# Patient Record
Sex: FEMALE | Race: WHITE | NOT HISPANIC OR LATINO | Employment: OTHER | URBAN - METROPOLITAN AREA
[De-identification: names, ages, dates, MRNs, and addresses within clinical notes are randomized per-mention and may not be internally consistent; named-entity substitution may affect disease eponyms.]

---

## 2017-01-25 ENCOUNTER — TELEPHONE (OUTPATIENT)
Dept: FAMILY MEDICINE | Facility: CLINIC | Age: 47
End: 2017-01-25

## 2017-01-25 NOTE — TELEPHONE ENCOUNTER
----- Message from Kate Bourgeois sent at 1/24/2017  7:25 AM CST -----  Contact: Pt   Pt would like the nurse to give her a call back in regards to going to the ER .. Contact number [phone]..

## 2017-01-28 ENCOUNTER — HOSPITAL ENCOUNTER (EMERGENCY)
Facility: HOSPITAL | Age: 47
Discharge: PSYCHIATRIC OR SUBSTANCE USE FACILITY | End: 2017-01-28
Attending: EMERGENCY MEDICINE
Payer: MEDICAID

## 2017-01-28 VITALS
TEMPERATURE: 98 F | BODY MASS INDEX: 33.75 KG/M2 | HEART RATE: 89 BPM | OXYGEN SATURATION: 96 % | SYSTOLIC BLOOD PRESSURE: 115 MMHG | RESPIRATION RATE: 17 BRPM | DIASTOLIC BLOOD PRESSURE: 78 MMHG | HEIGHT: 66 IN | WEIGHT: 210 LBS

## 2017-01-28 DIAGNOSIS — F32.A DEPRESSION, UNSPECIFIED DEPRESSION TYPE: Primary | ICD-10-CM

## 2017-01-28 DIAGNOSIS — F41.9 ANXIETY: ICD-10-CM

## 2017-01-28 LAB
ALBUMIN SERPL BCP-MCNC: 3.7 G/DL
ALP SERPL-CCNC: 64 U/L
ALT SERPL W/O P-5'-P-CCNC: 17 U/L
AMPHET+METHAMPHET UR QL: NEGATIVE
ANION GAP SERPL CALC-SCNC: 10 MMOL/L
APAP SERPL-MCNC: <3 UG/ML
AST SERPL-CCNC: 16 U/L
B-HCG UR QL: NEGATIVE
BACTERIA #/AREA URNS HPF: NORMAL /HPF
BARBITURATES UR QL SCN>200 NG/ML: NEGATIVE
BASOPHILS # BLD AUTO: 0.07 K/UL
BASOPHILS NFR BLD: 0.7 %
BENZODIAZ UR QL SCN>200 NG/ML: NEGATIVE
BILIRUB SERPL-MCNC: 0.3 MG/DL
BILIRUB UR QL STRIP: NEGATIVE
BUN SERPL-MCNC: 9 MG/DL
BZE UR QL SCN: NEGATIVE
CALCIUM SERPL-MCNC: 9.4 MG/DL
CANNABINOIDS UR QL SCN: NEGATIVE
CHLORIDE SERPL-SCNC: 103 MMOL/L
CLARITY UR: CLEAR
CO2 SERPL-SCNC: 26 MMOL/L
COLOR UR: ABNORMAL
CREAT SERPL-MCNC: 0.8 MG/DL
CREAT UR-MCNC: 40.4 MG/DL
DIFFERENTIAL METHOD: ABNORMAL
EOSINOPHIL # BLD AUTO: 0.2 K/UL
EOSINOPHIL NFR BLD: 2 %
ERYTHROCYTE [DISTWIDTH] IN BLOOD BY AUTOMATED COUNT: 14.5 %
EST. GFR  (AFRICAN AMERICAN): >60 ML/MIN/1.73 M^2
EST. GFR  (NON AFRICAN AMERICAN): >60 ML/MIN/1.73 M^2
ETHANOL SERPL-MCNC: <10 MG/DL
GLUCOSE SERPL-MCNC: 156 MG/DL
GLUCOSE UR QL STRIP: NEGATIVE
HCT VFR BLD AUTO: 39.4 %
HGB BLD-MCNC: 13.5 G/DL
HGB UR QL STRIP: NEGATIVE
KETONES UR QL STRIP: NEGATIVE
LEUKOCYTE ESTERASE UR QL STRIP: NEGATIVE
LYMPHOCYTES # BLD AUTO: 4.2 K/UL
LYMPHOCYTES NFR BLD: 40.2 %
MCH RBC QN AUTO: 29.2 PG
MCHC RBC AUTO-ENTMCNC: 34.3 %
MCV RBC AUTO: 85 FL
METHADONE UR QL SCN>300 NG/ML: NEGATIVE
MICROSCOPIC COMMENT: NORMAL
MONOCYTES # BLD AUTO: 1.1 K/UL
MONOCYTES NFR BLD: 10.6 %
NEUTROPHILS # BLD AUTO: 4.8 K/UL
NEUTROPHILS NFR BLD: 46.3 %
NITRITE UR QL STRIP: POSITIVE
OPIATES UR QL SCN: NEGATIVE
PCP UR QL SCN>25 NG/ML: NEGATIVE
PH UR STRIP: 6 [PH] (ref 5–8)
PLATELET # BLD AUTO: 466 K/UL
PMV BLD AUTO: 10.9 FL
POTASSIUM SERPL-SCNC: 4.1 MMOL/L
PROT SERPL-MCNC: 6.9 G/DL
PROT UR QL STRIP: NEGATIVE
RBC # BLD AUTO: 4.63 M/UL
SODIUM SERPL-SCNC: 139 MMOL/L
SP GR UR STRIP: 1.01 (ref 1–1.03)
SQUAMOUS #/AREA URNS HPF: 3 /HPF
TOXICOLOGY INFORMATION: NORMAL
TSH SERPL DL<=0.005 MIU/L-ACNC: 1.1 UIU/ML
URN SPEC COLLECT METH UR: ABNORMAL
UROBILINOGEN UR STRIP-ACNC: ABNORMAL EU/DL
WBC # BLD AUTO: 10.46 K/UL

## 2017-01-28 PROCEDURE — 99285 EMERGENCY DEPT VISIT HI MDM: CPT

## 2017-01-28 PROCEDURE — 87086 URINE CULTURE/COLONY COUNT: CPT

## 2017-01-28 PROCEDURE — 80053 COMPREHEN METABOLIC PANEL: CPT

## 2017-01-28 PROCEDURE — 85025 COMPLETE CBC W/AUTO DIFF WBC: CPT

## 2017-01-28 PROCEDURE — 80320 DRUG SCREEN QUANTALCOHOLS: CPT

## 2017-01-28 PROCEDURE — 81025 URINE PREGNANCY TEST: CPT

## 2017-01-28 PROCEDURE — 82570 ASSAY OF URINE CREATININE: CPT

## 2017-01-28 PROCEDURE — 84443 ASSAY THYROID STIM HORMONE: CPT

## 2017-01-28 PROCEDURE — 25000003 PHARM REV CODE 250: Performed by: EMERGENCY MEDICINE

## 2017-01-28 PROCEDURE — 80329 ANALGESICS NON-OPIOID 1 OR 2: CPT

## 2017-01-28 PROCEDURE — 81000 URINALYSIS NONAUTO W/SCOPE: CPT

## 2017-01-28 RX ORDER — CLONAZEPAM 0.5 MG/1
0.5 TABLET ORAL 2 TIMES DAILY
Status: DISCONTINUED | OUTPATIENT
Start: 2017-01-28 | End: 2017-01-28 | Stop reason: HOSPADM

## 2017-01-28 RX ORDER — CEPHALEXIN 250 MG/1
500 CAPSULE ORAL
Status: COMPLETED | OUTPATIENT
Start: 2017-01-28 | End: 2017-01-28

## 2017-01-28 RX ORDER — MULTIVIT WITH MINERALS/HERBS
1 TABLET ORAL 2 TIMES DAILY
COMMUNITY
End: 2018-08-03 | Stop reason: SDUPTHER

## 2017-01-28 RX ORDER — LANOLIN ALCOHOL/MO/W.PET/CERES
100 CREAM (GRAM) TOPICAL DAILY
COMMUNITY
End: 2018-08-03 | Stop reason: SDUPTHER

## 2017-01-28 RX ORDER — PHENAZOPYRIDINE HYDROCHLORIDE 100 MG/1
100 TABLET, FILM COATED ORAL 3 TIMES DAILY PRN
COMMUNITY
End: 2017-02-09

## 2017-01-28 RX ORDER — IBUPROFEN 200 MG
1 TABLET ORAL
Status: DISCONTINUED | OUTPATIENT
Start: 2017-01-28 | End: 2017-01-28 | Stop reason: HOSPADM

## 2017-01-28 RX ORDER — SULFAMETHOXAZOLE AND TRIMETHOPRIM 800; 160 MG/1; MG/1
1 TABLET ORAL 2 TIMES DAILY
COMMUNITY
End: 2017-02-09

## 2017-01-28 RX ORDER — IBUPROFEN 800 MG/1
800 TABLET ORAL 3 TIMES DAILY
COMMUNITY
End: 2017-04-10

## 2017-01-28 RX ORDER — CEPHALEXIN 500 MG/1
500 CAPSULE ORAL EVERY 8 HOURS
Qty: 21 CAPSULE | Refills: 0 | Status: SHIPPED | OUTPATIENT
Start: 2017-01-28 | End: 2017-02-04

## 2017-01-28 RX ADMIN — CLONAZEPAM 0.5 MG: 0.5 TABLET ORAL at 08:01

## 2017-01-28 RX ADMIN — NICOTINE 1 PATCH: 21 PATCH, EXTENDED RELEASE TRANSDERMAL at 08:01

## 2017-01-28 RX ADMIN — CEPHALEXIN 500 MG: 250 CAPSULE ORAL at 10:01
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
## 2017-01-28 NOTE — ED TRIAGE NOTES
"Pt. With PMH of bipolar disorder presents for psychiatric evaluation. Per family member, pt. Has been out of town for several days and has been "all over the board and flying off the charts", not sleeping, or eating. Pt. Reports that she has been compliant with bipolar medication. Pt. States that she received gastric sleeve in August and has difficulty keeping meds down. Pt. Reports she has allergies and has been in pain. Pt. C/o neck, lumbar and upper back problems. Pt. C/o increasing pain and no meds for relief.   "

## 2017-01-28 NOTE — ED NOTES
Patient clothes, shoes, and makeup pouch in patient belongings bag and labeled. Patient sent purse and jewelry home with Sister in law named Tabitha Man. Medications given to house supervisor (envelopes #: [phone] and [phone]).

## 2017-01-28 NOTE — ED NOTES
Pt. Clothing collected- sweatshirt, blue t-shirt, and sweat pants, shoes, makeup case with moisturizer, clear case with razor. Meds given to HS. Belongings given to security. Per patient request, purse given to Tabitha. Contact number included in chart below.

## 2017-01-30 LAB — BACTERIA UR CULT: NORMAL

## 2017-02-03 ENCOUNTER — TELEPHONE (OUTPATIENT)
Dept: FAMILY MEDICINE | Facility: CLINIC | Age: 47
End: 2017-02-03

## 2017-02-09 ENCOUNTER — LAB VISIT (OUTPATIENT)
Dept: LAB | Facility: HOSPITAL | Age: 47
End: 2017-02-09
Attending: SURGERY
Payer: MEDICAID

## 2017-02-09 DIAGNOSIS — Z98.84 STATUS POST BARIATRIC SURGERY: ICD-10-CM

## 2017-02-09 DIAGNOSIS — E61.1 IRON DEFICIENCY: ICD-10-CM

## 2017-02-09 DIAGNOSIS — E66.9 OBESITY (BMI 30.0-34.9): ICD-10-CM

## 2017-02-09 DIAGNOSIS — E56.9 UNSPECIFIED VITAMIN DEFICIENCY: ICD-10-CM

## 2017-02-09 LAB
25(OH)D3+25(OH)D2 SERPL-MCNC: 35 NG/ML
FOLATE SERPL-MCNC: 15.8 NG/ML
IRON SERPL-MCNC: 15 UG/DL
PREALB SERPL-MCNC: 17 MG/DL
SATURATED IRON: 4 %
TOTAL IRON BINDING CAPACITY: 343 UG/DL
TRANSFERRIN SERPL-MCNC: 232 MG/DL
VIT B12 SERPL-MCNC: 501 PG/ML

## 2017-02-09 PROCEDURE — 82306 VITAMIN D 25 HYDROXY: CPT

## 2017-02-09 PROCEDURE — 82607 VITAMIN B-12: CPT

## 2017-02-09 PROCEDURE — 84134 ASSAY OF PREALBUMIN: CPT

## 2017-02-09 PROCEDURE — 36415 COLL VENOUS BLD VENIPUNCTURE: CPT

## 2017-02-09 PROCEDURE — 82746 ASSAY OF FOLIC ACID SERUM: CPT

## 2017-02-09 PROCEDURE — 83540 ASSAY OF IRON: CPT

## 2017-02-09 PROCEDURE — 84466 ASSAY OF TRANSFERRIN: CPT

## 2017-02-10 ENCOUNTER — OFFICE VISIT (OUTPATIENT)
Dept: FAMILY MEDICINE | Facility: CLINIC | Age: 47
End: 2017-02-10
Payer: MEDICAID

## 2017-02-10 VITALS
OXYGEN SATURATION: 97 % | DIASTOLIC BLOOD PRESSURE: 76 MMHG | HEART RATE: 109 BPM | TEMPERATURE: 98 F | WEIGHT: 194 LBS | SYSTOLIC BLOOD PRESSURE: 120 MMHG | HEIGHT: 66 IN | BODY MASS INDEX: 31.18 KG/M2 | RESPIRATION RATE: 17 BRPM

## 2017-02-10 DIAGNOSIS — B35.4 TINEA CORPORIS: ICD-10-CM

## 2017-02-10 DIAGNOSIS — F31.9 BIPOLAR 1 DISORDER: Chronic | ICD-10-CM

## 2017-02-10 DIAGNOSIS — E11.65 UNCONTROLLED TYPE 2 DIABETES MELLITUS WITH HYPERGLYCEMIA, WITH LONG-TERM CURRENT USE OF INSULIN: Primary | Chronic | ICD-10-CM

## 2017-02-10 DIAGNOSIS — K21.9 GASTROESOPHAGEAL REFLUX DISEASE WITHOUT ESOPHAGITIS: ICD-10-CM

## 2017-02-10 DIAGNOSIS — Z79.4 UNCONTROLLED TYPE 2 DIABETES MELLITUS WITH HYPERGLYCEMIA, WITH LONG-TERM CURRENT USE OF INSULIN: Primary | Chronic | ICD-10-CM

## 2017-02-10 LAB — GLUCOSE SERPL-MCNC: 132 MG/DL (ref 70–110)

## 2017-02-10 PROCEDURE — 99999 PR PBB SHADOW E&M-EST. PATIENT-LVL III: CPT | Mod: PBBFAC,,, | Performed by: INTERNAL MEDICINE

## 2017-02-10 PROCEDURE — 99214 OFFICE O/P EST MOD 30 MIN: CPT | Mod: S$PBB,,, | Performed by: INTERNAL MEDICINE

## 2017-02-10 PROCEDURE — 82948 REAGENT STRIP/BLOOD GLUCOSE: CPT | Mod: PBBFAC,PN | Performed by: INTERNAL MEDICINE

## 2017-02-10 PROCEDURE — 99213 OFFICE O/P EST LOW 20 MIN: CPT | Mod: PBBFAC,PN | Performed by: INTERNAL MEDICINE

## 2017-02-10 RX ORDER — PANTOPRAZOLE SODIUM 40 MG/1
40 TABLET, DELAYED RELEASE ORAL DAILY
Qty: 30 TABLET | Refills: 3 | Status: SHIPPED | OUTPATIENT
Start: 2017-02-10 | End: 2017-04-13

## 2017-02-10 RX ORDER — PHENAZOPYRIDINE HYDROCHLORIDE 100 MG/1
100 TABLET, FILM COATED ORAL EVERY 8 HOURS PRN
COMMUNITY
End: 2017-04-10

## 2017-02-10 RX ORDER — NYSTATIN 100000 U/G
CREAM TOPICAL 2 TIMES DAILY
Qty: 30 G | Refills: 0 | Status: SHIPPED | OUTPATIENT
Start: 2017-02-10 | End: 2017-02-15

## 2017-02-10 RX ORDER — ACETAMINOPHEN 325 MG/1
325 TABLET ORAL EVERY 6 HOURS PRN
COMMUNITY
End: 2017-04-10

## 2017-02-10 RX ORDER — MELOXICAM 7.5 MG/1
7.5 TABLET ORAL 2 TIMES DAILY
Qty: 60 TABLET | Refills: 0 | Status: SHIPPED | OUTPATIENT
Start: 2017-02-10 | End: 2017-04-06 | Stop reason: SDUPTHER

## 2017-02-10 RX ORDER — PANTOPRAZOLE SODIUM 40 MG/1
40 TABLET, DELAYED RELEASE ORAL DAILY
Qty: 30 TABLET | Refills: 3 | Status: CANCELLED | OUTPATIENT
Start: 2017-02-10 | End: 2018-02-10

## 2017-02-10 RX ORDER — FLUCONAZOLE 150 MG/1
150 TABLET ORAL DAILY
Qty: 3 TABLET | Refills: 0 | Status: SHIPPED | OUTPATIENT
Start: 2017-02-10 | End: 2017-02-11

## 2017-02-10 RX ORDER — MELOXICAM 7.5 MG/1
7.5 TABLET ORAL 2 TIMES DAILY
Status: CANCELLED | OUTPATIENT
Start: 2017-02-10

## 2017-02-10 RX ORDER — RISPERIDONE 4 MG/1
4 TABLET ORAL 2 TIMES DAILY
COMMUNITY
End: 2017-04-10 | Stop reason: DRUGHIGH

## 2017-02-10 RX ORDER — MUPIROCIN 20 MG/G
OINTMENT TOPICAL 2 TIMES DAILY
Qty: 15 G | Refills: 1 | Status: SHIPPED | OUTPATIENT
Start: 2017-02-10 | End: 2017-02-20

## 2017-02-10 NOTE — PROGRESS NOTES
"Subjective:       Patient ID: Audrey Natarajan is a 46 y.o. female.    Chief Complaint: Medication Refill and Cough    HPI Comments: Skin problem    HPI: 45 y/o with DM, chronic lower back pain and bipolar disorder presents after recent inpatient psychiatric hospitalization for bibi. She presents today complaining of painful rahs to abominal folds and inguinal area. No fevers chills no involvement of oral mucosa or inside vaginal area she does endorse foul odor +itching no swalloing problems    Review of Systems   Constitutional: Negative for activity change, appetite change, fatigue, fever and unexpected weight change.   HENT: Negative for ear pain, rhinorrhea and sore throat.    Eyes: Negative for discharge and visual disturbance.   Respiratory: Negative for chest tightness, shortness of breath and wheezing.    Cardiovascular: Negative for chest pain, palpitations and leg swelling.   Gastrointestinal: Negative for abdominal pain, constipation and diarrhea.   Endocrine: Negative for cold intolerance and heat intolerance.   Genitourinary: Negative for dysuria and hematuria.   Musculoskeletal: Negative for joint swelling and neck stiffness.   Skin: Positive for rash.   Neurological: Negative for dizziness, syncope, weakness and headaches.   Psychiatric/Behavioral: Positive for sleep disturbance. Negative for suicidal ideas. The patient is hyperactive.        Objective:     Vitals:    02/10/17 0956   BP: 120/76   BP Location: Right arm   Patient Position: Sitting   BP Method: Manual   Pulse: 109   Resp: 17   Temp: 98.1 °F (36.7 °C)   TempSrc: Oral   SpO2: 97%   Weight: 88 kg (194 lb 0.1 oz)   Height: 5' 6" (1.676 m)          Physical Exam   Constitutional: She is oriented to person, place, and time. She appears well-developed and well-nourished.   HENT:   Head: Normocephalic and atraumatic.   Eyes: Conjunctivae are normal. Pupils are equal, round, and reactive to light.   Neck: Normal range of motion. "   Cardiovascular: Normal rate and regular rhythm.  Exam reveals no gallop and no friction rub.    No murmur heard.  Pulmonary/Chest: Effort normal and breath sounds normal. She has no wheezes. She has no rales.   Abdominal: Soft. Bowel sounds are normal. There is no tenderness. There is no rebound and no guarding.   Genitourinary:   Genitourinary Comments: External vaginal exam shows erythematous skin without fluctuance or mucosa break down no enlarged bartholin's glands   Musculoskeletal: Normal range of motion. She exhibits no edema or tenderness.   Neurological: She is alert and oriented to person, place, and time. No cranial nerve deficit.   Skin: Skin is warm and dry. Rash noted. There is erythema.   Abdominal folds and inguinal area macerated with erythematous papules without open ulceration. No discharge    Psychiatric:   Pressured tangential speech good eye contact not responding to internal stimuli       Assessment:       1. Uncontrolled type 2 diabetes mellitus with hyperglycemia, with long-term current use of insulin    2. Tinea corporis    3. Wound infection after surgery, subsequent encounter    4. Gastroesophageal reflux disease without esophagitis    5. Bipolar 1 disorder        Plan:    1. Improving glucose control s/p bariatric surgery no evidecence of dka  2. Diffuse will treat with systemic fluconazole x 3 days discusse dimportance of keeping skin dry topical antifungal cream to combat recurrance    3. Has multiple pox from scratching bactroban prn    4. Continue ppi    5. [facility]
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
## 2017-02-15 ENCOUNTER — HOSPITAL ENCOUNTER (EMERGENCY)
Facility: HOSPITAL | Age: 47
Discharge: HOME | End: 2017-02-15
Attending: EMERGENCY MEDICINE
Payer: MEDICAID

## 2017-02-15 VITALS
DIASTOLIC BLOOD PRESSURE: 59 MMHG | SYSTOLIC BLOOD PRESSURE: 111 MMHG | OXYGEN SATURATION: 99 % | HEIGHT: 66 IN | HEART RATE: 106 BPM | WEIGHT: 190 LBS | RESPIRATION RATE: 18 BRPM | TEMPERATURE: 98 F | BODY MASS INDEX: 30.53 KG/M2

## 2017-02-15 DIAGNOSIS — B37.2 CANDIDAL DERMATITIS: Primary | ICD-10-CM

## 2017-02-15 PROCEDURE — 99283 EMERGENCY DEPT VISIT LOW MDM: CPT

## 2017-02-15 RX ORDER — SULFAMETHOXAZOLE AND TRIMETHOPRIM 800; 160 MG/1; MG/1
1 TABLET ORAL 2 TIMES DAILY
Qty: 14 TABLET | Refills: 0 | Status: SHIPPED | OUTPATIENT
Start: 2017-02-15 | End: 2017-02-22

## 2017-02-15 RX ORDER — NYSTATIN 100000 [USP'U]/G
POWDER TOPICAL 2 TIMES DAILY
Qty: 60 G | Refills: 2 | Status: SHIPPED | OUTPATIENT
Start: 2017-02-15 | End: 2017-04-10

## 2017-02-15 NOTE — ED AVS SNAPSHOT
[facility]  [address] [place] [address]               Audrey Natarajan   2/15/2017  9:07 PM   ED    Description:  Female : 1970   Department:  [facility]           Your Care was Coordinated By:     Provider Role From To    Ike Alarcon MD Attending Provider 02/15/17 2117 --    JERSON Martines Physician Assistant 02/15/17 2117 --      Reason for Visit     Rash           Diagnoses this Visit        Comments    Candidal dermatitis    -  Primary       ED Disposition     None           To Do List           Follow-up Information     Schedule an appointment as soon as possible for a visit with Ej Kaminski MD.    Specialty:  Dermatology    Contact information:    [address] [place] [address]  [facility] [place] [address]  [phone]         These Medications        Disp Refills Start End    nystatin (MYCOSTATIN) powder 60 g 2 2/15/2017     Apply topically 2 (two) times daily. - Topical (Top)    Pharmacy: [facility] [address] [place] [address] EXPY AT [facility] Ph #: [phone]       sulfamethoxazole-trimethoprim 800-160mg (BACTRIM DS) 800-160 mg Tab 14 tablet 0 2/15/2017 2017    Take 1 tablet by mouth 2 (two) times daily. - Oral    Pharmacy: [facility] [address] [place] [address] EXPY AT [facility] Ph #: [phone]         [facility]     [facility] - 24/7 Assistance  Registered nurses in the [facility] provide clinical advisement, health education, appointment booking, and other advisory services.  Call for this free service at [phone].             Medications           Message regarding Medications     Verify the changes and/or additions to your medication regime listed below are the same as discussed with your clinician today.  If any of these changes or additions are incorrect, please  notify your healthcare provider.        START taking these NEW medications        Refills    nystatin (MYCOSTATIN) powder 2    Sig: Apply topically 2 (two) times daily.    Class: Print    Route: Topical (Top)    sulfamethoxazole-trimethoprim 800-160mg (BACTRIM DS) 800-160 mg Tab 0    Sig: Take 1 tablet by mouth 2 (two) times daily.    Class: Print    Route: Oral      STOP taking these medications     nystatin (MYCOSTATIN) cream Apply topically 2 (two) times daily.           Verify that the below list of medications is an accurate representation of the medications you are currently taking.  If none reported, the list may be blank. If incorrect, please contact your healthcare provider. Carry this list with you in case of emergency.           Current Medications     aspirin (ECOTRIN) 81 MG EC tablet Take 81 mg by mouth.    atorvastatin (LIPITOR) 40 MG tablet Take 1 tablet (40 mg total) by mouth once daily.    b complex vitamins tablet Take 1 tablet by mouth 2 (two) times daily.     clonazePAM (KLONOPIN) 0.5 MG tablet 2 (two) times daily.     clonazePAM (KLONOPIN) 1 MG tablet Take 1 tablet (1 mg total) by mouth every evening.    cyanocobalamin (VITAMIN B-12) 1000 MCG tablet Take 100 mcg by mouth once daily.    fish oil-omega-3 fatty acids 300-1,000 mg capsule Take 2 g by mouth once daily. Instructed to stop 5-7 days before surgery (8/11/16).    furosemide (LASIX) 20 MG tablet Take 1 tablet by mouth once daily.    gabapentin (NEURONTIN) 600 MG tablet Take 2 tablets by mouth 3 (three) times daily.    hydrOXYzine pamoate (VISTARIL) 25 MG Cap Take 1 capsule by mouth 2 (two) times daily.    meloxicam (MOBIC) 7.5 MG tablet Take 1 tablet (7.5 mg total) by mouth 2 (two) times daily.    metformin (GLUCOPHAGE) 1000 MG tablet TAKE 1 TABLET BY MOUTH TWICE DAILY WITH MEALS    metoprolol tartrate (LOPRESSOR) 50 MG tablet Take 50 mg by mouth.    mupirocin (BACTROBAN) 2 % ointment Apply topically 2 (two) times daily.    pantoprazole  "(PROTONIX) 40 MG tablet Take 1 tablet (40 mg total) by mouth once daily.    risperidone (RISPERDAL) 2 MG tablet Take 2 tablets by mouth every evening.     risperidone (RISPERDAL) 4 MG tablet Take 4 mg by mouth 2 (two) times daily.    acetaminophen (TYLENOL) 325 MG tablet Take 325 mg by mouth every 6 (six) hours as needed for Pain.    albuterol (PROAIR HFA) 90 mcg/actuation inhaler Inhale 1 puff into the lungs 4 (four) times daily as needed.    blood-glucose meter kit Use as instructed    fluticasone (FLONASE) 50 mcg/actuation nasal spray 1 spray by Each Nare route once daily.    ibuprofen (ADVIL,MOTRIN) 800 MG tablet Take 800 mg by mouth 3 (three) times daily.    lancets (ACCU-CHEK SOFTCLIX LANCETS) Misc 1 each by Misc.(Non-Drug; Combo Route) route once daily.    lisinopril (PRINIVIL,ZESTRIL) 5 MG tablet Take 1 tablet (5 mg total) by mouth once daily.    methocarbamol (ROBAXIN) 750 MG Tab Take 1 tablet (750 mg total) by mouth 4 (four) times daily as needed.    mometasone-formoterol (DULERA) 100-5 mcg/actuation HFAA Inhale 2 puffs into the lungs 2 (two) times daily.    multivitamin (THERAGRAN) per tablet Take 1 tablet by mouth every morning.    nystatin (MYCOSTATIN) powder Apply topically 2 (two) times daily.    OXYGEN-AIR DELIVERY SYSTEMS MISC 2 L by Misc.(Non-Drug; Combo Route) route daily as needed (and Mostly at Night.).     phenazopyridine (PYRIDIUM) 100 MG tablet Take 100 mg by mouth every 8 (eight) hours as needed for Pain.    polyethylene glycol (GLYCOLAX) 17 gram/dose powder Take 17 g by mouth once daily.    sulfamethoxazole-trimethoprim 800-160mg (BACTRIM DS) 800-160 mg Tab Take 1 tablet by mouth 2 (two) times daily.    TRUETEST TEST STRIPS Strp TEST BLOOD SUGAR FOUR TIMES DAILY           Clinical Reference Information           Your Vitals Were     BP Pulse Temp Resp Height Weight    111/59 (BP Location: Right arm, Patient Position: Sitting) 106 97.8 °F (36.6 °C) (Oral) 18 5' 6" (1.676 m) 86.2 kg (190 lb) "    Last Period SpO2 BMI          11/01/2011 (LMP Unknown) 99% 30.67 kg/m2        Allergies as of 2/15/2017        Reactions    Morphine Other (See Comments)    Patient had a psychotic episode after taking Morphine  Agitation, hallucinations    Penicillins Anaphylaxis    itching      Immunizations Administered on Date of Encounter - 2/15/2017     None      ED Micro, Lab, POCT     Start Ordered       Status Ordering Provider    02/15/17 2059 02/15/17 2058  POCT urine pregnancy  Once      Ordered     02/15/17 2059 02/15/17 2058  Urinalysis  STAT      Ordered       ED Imaging Orders     None        Discharge Instructions         Candida Skin Infection (Adult)  Candida is type of yeast. It grows naturally on the skin and in the mouth. If it grows out of control, it can cause an infection. Candida can cause infections in the genital area, skin folds, in the mouth, and under the breasts. Anyone can get this infection. It is more common in a person with a weak immune system, such as from diabetes, HIV, or cancer. Its also more common in someone who has been on antibiotic therapy. And its more common people who are overweight or who have incontinence. Wearing tight-fitting clothing and taking part in activities with lots of skin-to-skin contact can also put you at risk.  Candida causes the skin to become bright red and inflamed. The border of the infected part of the skin is often raised. The infection causes pain and itching. Sometimes the skin peels and bleeds. In the mouth, candida is called thrush, and may cause white thickened areas.  A Candida rash is most often treated with an antifungal cream or ointment. The rash will clear a few days after starting the medicine. Infections that dont go away may need a prescription medicine. In rare cases, a bacterial infection can also occur.  Home care  Your healthcare provider will recommend an antifungal cream or ointment for the rash. He or she may also prescribe a medicine  for the itch. Follow all instructions for using these medicines. Dont use cornstarch powder. Cornstarch can cause the Candida infection to get worse.  General care:  · Keep your skin clean by washing the area twice a day.  · Use the cream as directed until your rash is gone. Once the skin has healed, keep it dry to prevent another infection.   · If you are overweight, talk with your healthcare provider about a plan to lose excess weight.  · Avoid clothes that fit tightly.  Follow-up care  Follow up with your healthcare provider, or as advised. Your rash will clear in 7 to 14 days. Call your healthcare provider if the rash is not gone after 14 days.  When to seek medical advice  Call your healthcare provider right away if any of these occur:  · Pain or redness that gets worse or spreads  · Fluid coming from the skin  · Yellow crusts on the skin  · Fever of 100.4°F (38°C) or higher, or as directed by your healthcare provider  Date Last Reviewed: 9/1/2016  © 5074-7968 [employer]. [address] [place] [address]. All rights reserved. This information is not intended as a substitute for professional medical care. Always follow your healthcare professional's instructions.          Your Scheduled Appointments     Aug 10, 2017  9:00 AM CDT   Established Patient with Corey Mauro MD   [facility], [facility]    [address] [facility] [address] [place] [address]   [phone]              [facility] Sign-Up     Activating your [facility] account is as easy as 1-2-3!     1) Visit [email or web address], select Sign Up Now, enter this activation code and your date of birth, then select Next.  Activation code not generated  Current Patient Portal Status: Account disabled      2) Create a username and password to use when you visit [facility] in the future and select a security question in case you lose your password and select Next.    3) Enter your e-mail address and click Sign Up!    Additional  Information  If you have questions, please e-mail [email or web address] or call [phone] to talk to our [facility] staff. Remember, [facility] is NOT to be used for urgent needs. For medical emergencies, dial 911.         Smoking Cessation     If you would like to quit smoking:   You may be eligible for free services if you are a [place] resident and started smoking cigarettes before September 1, 1988.  Call the Smoking Cessation Trust (SCT) toll free at (059) 255-5531 or (481) 762-5906.   Call 2-933-QUIT-NOW if you do not meet the above criteria.             [facility] complies with applicable Federal civil rights laws and does not discriminate on the basis of race, color, national origin, age, disability, or sex.        Language Assistance Services     ATTENTION: Language assistance services are available, free of charge. Please call [phone].      ATENCIÓN: Si habla español, tiene a fine disposición servicios gratuitos de asistencia lingüística. Llame al [phone].     CHÚ Ý: N?u b?n nói Ti?ng Vi?t, có các d?ch v? h? tr? ngôn ng? mi?n phí dành cho b?n. G?i s? [phone].

## 2017-02-16 NOTE — DISCHARGE INSTRUCTIONS
Candida Skin Infection (Adult)  Candida is type of yeast. It grows naturally on the skin and in the mouth. If it grows out of control, it can cause an infection. Candida can cause infections in the genital area, skin folds, in the mouth, and under the breasts. Anyone can get this infection. It is more common in a person with a weak immune system, such as from diabetes, HIV, or cancer. Its also more common in someone who has been on antibiotic therapy. And its more common people who are overweight or who have incontinence. Wearing tight-fitting clothing and taking part in activities with lots of skin-to-skin contact can also put you at risk.  Candida causes the skin to become bright red and inflamed. The border of the infected part of the skin is often raised. The infection causes pain and itching. Sometimes the skin peels and bleeds. In the mouth, candida is called thrush, and may cause white thickened areas.  A Candida rash is most often treated with an antifungal cream or ointment. The rash will clear a few days after starting the medicine. Infections that dont go away may need a prescription medicine. In rare cases, a bacterial infection can also occur.  Home care  Your healthcare provider will recommend an antifungal cream or ointment for the rash. He or she may also prescribe a medicine for the itch. Follow all instructions for using these medicines. Dont use cornstarch powder. Cornstarch can cause the Candida infection to get worse.  General care:  · Keep your skin clean by washing the area twice a day.  · Use the cream as directed until your rash is gone. Once the skin has healed, keep it dry to prevent another infection.   · If you are overweight, talk with your healthcare provider about a plan to lose excess weight.  · Avoid clothes that fit tightly.  Follow-up care  Follow up with your healthcare provider, or as advised. Your rash will clear in 7 to 14 days. Call your healthcare provider if the rash  is not gone after 14 days.  When to seek medical advice  Call your healthcare provider right away if any of these occur:  · Pain or redness that gets worse or spreads  · Fluid coming from the skin  · Yellow crusts on the skin  · Fever of 100.4°F (38°C) or higher, or as directed by your healthcare provider  Date Last Reviewed: 9/1/2016  © 0747-2923 [employer]. [address] [place] [address], [place] [address]. All rights reserved. This information is not intended as a substitute for professional medical care. Always follow your healthcare professional's instructions.

## 2017-02-16 NOTE — ED PROVIDER NOTES
"Encounter Date: 2/15/2017    SCRIBE #1 NOTE: I, Prosper Rubi, am scribing for, and in the presence of,  Tennille Julio PA-C. I have scribed the following portions of the note - Other sections scribed: HPI/ROS.       History     Chief Complaint   Patient presents with    Rash     " I have yeast that is on my lower belly, and thighs that is very red. It has an odor to it. I tried rubbing cream on it w/ no relief.     Review of patient's allergies indicates:   Allergen Reactions    Morphine Other (See Comments)     Patient had a psychotic episode after taking Morphine  Agitation, hallucinations    Penicillins Anaphylaxis     itching     HPI Comments: CC: Rash    HPI: This 46 y.o. Female with COPD, DM, hypercholesteremia, DVT of lower extremities, PE, coronary artery disease, bipolar 1 disorder, neuromuscular disorder, depression, and HTN presents to the ED c/o a yeast infection on her lower abdomin and upper pubic region that began about 2 weeks ago. The pt has some possibly broken skin around her infection. Pt has been using nystatin cream on her rash for the last week without relief. The pt denies fever, nausea, or vomiting.       The history is provided by the patient. No  was used.     Past Medical History   Diagnosis Date    Arthritis     Asthma     Bipolar 1 disorder     COPD (chronic obstructive pulmonary disease)     Coronary artery disease      A fib    Depression      bipolar manic depresson    Diabetes mellitus     DVT of lower extremity, bilateral July 2013     bilateral LE DVT. [place] filter placed.     Encounter for blood transfusion     History of blood clots 1. Left Leg=2003; 2.Bilateral Groin=Blood Clots= 5 or 6/ 2013 & 7/2013; 3. LLL of Lung=7/2013;  4. Lt. Lower Leg=7/2013.      Pt. had 1st Blood Clot after Zifrxezufgei=[id number], & Last=2013. [place] Filter= Rt.Lateral Neck.    HTN (hypertension) 6/6/2013     Pt states that she does not have hypertension    " "Hypercholesteremia     Irregular heartbeat     Neuromuscular disorder      neuropathy feet    PE (pulmonary embolism) 2013      bilat LE DVT.     Restless leg syndrome      Past Medical History Pertinent Negatives   Diagnosis Date Noted    Amblyopia 2015    Cataract 2015    Diabetic retinopathy 2015    Glaucoma 2015    Macular degeneration 2015    Myocardial infarction 2014    Retinal detachment 2015    Sickle cell anemia 2015    Sickle cell trait 2015    Strabismus 2015    Uveitis 2015     Past Surgical History   Procedure Laterality Date    Splenectomy, total  2003    Vein surgery       Lt leg    Green' s filter Right 2012     Right Neck & Tunneled Down.    Tonsillectomy       as a child    Abdominal surgery      Ovarian cyst removal  3/13/2014    Hernia repair       "[employer] around th Belly Button.", pt. states    Bilateral oophorectomy Bilateral 2015    Pr removal of ovary/tube(s)      Tympanostomy tube placement  1976     Family History   Problem Relation Age of Onset    Hypertension Father     Diabetes Father     Heart disease Father     Cataracts Father     Diabetes Paternal Grandfather     Heart disease Paternal Grandfather     No Known Problems Mother     Ovarian cancer Maternal Grandmother       from this. ? age     No Known Problems Sister     No Known Problems Brother     No Known Problems Maternal Aunt     No Known Problems Maternal Uncle     No Known Problems Paternal Aunt     No Known Problems Paternal Uncle     No Known Problems Maternal Grandfather     No Known Problems Paternal Grandmother     Uterine cancer Neg Hx     Breast cancer Neg Hx     Colon cancer Neg Hx     Amblyopia Neg Hx     Blindness Neg Hx     Cancer Neg Hx     Glaucoma Neg Hx     Macular degeneration Neg Hx     Retinal detachment Neg Hx     Strabismus Neg Hx     Stroke Neg Hx     " Thyroid disease Neg Hx      Social History   Substance Use Topics    Smoking status: Current Every Day Smoker     Packs/day: 1.50     Years: 25.00     Types: Cigarettes     Last attempt to quit: 5/25/2016    Smokeless tobacco: Never Used    Alcohol use No     Review of Systems   Constitutional: Negative for fever.   HENT: Negative for sore throat.    Respiratory: Negative for shortness of breath.    Cardiovascular: Negative for chest pain.   Gastrointestinal: Negative for nausea.   Genitourinary: Negative for dysuria.   Musculoskeletal: Negative for back pain.   Skin: Positive for rash.   Neurological: Negative for weakness.   Hematological: Does not bruise/bleed easily.       Physical Exam   Initial Vitals   BP Pulse Resp Temp SpO2   02/15/17 1922 02/15/17 1922 02/15/17 1922 02/15/17 1922 02/15/17 1922   111/59 106 18 97.8 °F (36.6 °C) 99 %     Physical Exam    Constitutional: She appears well-developed and well-nourished.   Cardiovascular: Normal rate, regular rhythm, normal heart sounds and intact distal pulses.   Pulmonary/Chest: Breath sounds normal.   Neurological: She is alert and oriented to person, place, and time. She has normal strength.   Skin: Skin is warm. Rash (multiple erythematous satellite lesions to lower abdomen and groin area with some excoriations.) noted.         ED Course   Procedures  Labs Reviewed - No data to display          Medical Decision Making:   Initial Assessment:   46-year-old female with a past medical history of diabetes, hypertension and recent weight loss surgery presents complaining of a pruritic burning rash to lower abdomen and groin area.  Patient currently using nystatin cream without relief.  Patient reports area is still painful and moist.  On exam patient has multiple erythematous satellite lesions to lower abdomen and groin area.  There is some areas of skin excoriations.  I suspect patient has candidal dermatitis.  I will discharge patient home with nystatin  powder and Bactrim orally.  Patient instructed to follow up with PCP.  Patient is stable for discharge.            Scribe Attestation:   Scribe #1: I performed the above scribed service and the documentation accurately describes the services I performed. I attest to the accuracy of the note.    Attending Attestation:     Physician Attestation Statement for NP/PA:   I discussed this assessment and plan of this patient with the NP/PA, but I did not personally examine the patient. The face to face encounter was performed by the NP/PA.    Other NP/PA Attestation Additions:      Medical Decision Makin-year-old female presenting with rash.  Exam consistent with candidal infection.  I agree with plan.       Physician Attestation for Scribe:  Physician Attestation Statement for Scribe #1: I, Tennille Julio PA-C, reviewed documentation, as scribed by Prosper Rubi in my presence, and it is both accurate and complete.                 ED Course     Clinical Impression:   The encounter diagnosis was Candidal dermatitis.          JERSON Martines  17 [phone]       Ike Alarcon MD  17 0816

## 2017-02-16 NOTE — ED TRIAGE NOTES
Pt reports she had weight loss surgery, now she has a red rash under her large abdominal skin folds. She complains of burning pain to rash. She went to PCP last week and they gave her nystatin cream. She reportedly went to [facility] for anxiety and depression and got out of there last Wed.  Has been using Nystatin cream 7 days. Pt reports she was here earlier, but LWBS and now she is back.

## 2017-03-07 DIAGNOSIS — M54.32 LEFT SIDED SCIATICA: ICD-10-CM

## 2017-03-07 RX ORDER — METHOCARBAMOL 750 MG/1
750 TABLET, FILM COATED ORAL 4 TIMES DAILY PRN
Qty: 60 TABLET | Refills: 1 | Status: SHIPPED | OUTPATIENT
Start: 2017-03-07 | End: 2017-04-10 | Stop reason: SDUPTHER

## 2017-03-07 NOTE — TELEPHONE ENCOUNTER
----- Message from Gisella Plasencia sent at 3/7/2017  9:55 AM CST -----  Refill: methocarbamol (ROBAXIN) 750 MG Tab    Please send to [facility]. Please call patient at [phone]. Thank you!

## 2017-03-30 DIAGNOSIS — E11.59 HYPERTENSION ASSOCIATED WITH DIABETES: ICD-10-CM

## 2017-03-30 DIAGNOSIS — I15.2 HYPERTENSION ASSOCIATED WITH DIABETES: ICD-10-CM

## 2017-03-31 RX ORDER — LISINOPRIL 5 MG/1
TABLET ORAL
Qty: 30 TABLET | Refills: 0 | Status: SHIPPED | OUTPATIENT
Start: 2017-03-31 | End: 2017-06-19 | Stop reason: SDUPTHER

## 2017-04-06 RX ORDER — MELOXICAM 7.5 MG/1
TABLET ORAL
Qty: 60 TABLET | Refills: 0 | Status: SHIPPED | OUTPATIENT
Start: 2017-04-06 | End: 2017-05-03 | Stop reason: SDUPTHER

## 2017-04-07 DIAGNOSIS — E11.59 HYPERTENSION ASSOCIATED WITH DIABETES: ICD-10-CM

## 2017-04-07 DIAGNOSIS — I15.2 HYPERTENSION ASSOCIATED WITH DIABETES: ICD-10-CM

## 2017-04-07 RX ORDER — FUROSEMIDE 20 MG/1
TABLET ORAL
Qty: 30 TABLET | Refills: 1 | Status: SHIPPED | OUTPATIENT
Start: 2017-04-07 | End: 2017-07-11 | Stop reason: SDUPTHER

## 2017-04-10 ENCOUNTER — OFFICE VISIT (OUTPATIENT)
Dept: FAMILY MEDICINE | Facility: CLINIC | Age: 47
End: 2017-04-10
Payer: MEDICAID

## 2017-04-10 VITALS
OXYGEN SATURATION: 97 % | HEIGHT: 66 IN | HEART RATE: 82 BPM | TEMPERATURE: 98 F | RESPIRATION RATE: 17 BRPM | SYSTOLIC BLOOD PRESSURE: 138 MMHG | DIASTOLIC BLOOD PRESSURE: 78 MMHG | BODY MASS INDEX: 34.61 KG/M2 | WEIGHT: 215.38 LBS

## 2017-04-10 DIAGNOSIS — H60.502 ACUTE NONINFECTIVE OTITIS EXTERNA OF LEFT EAR, UNSPECIFIED TYPE: Primary | ICD-10-CM

## 2017-04-10 DIAGNOSIS — M54.32 LEFT SIDED SCIATICA: ICD-10-CM

## 2017-04-10 PROCEDURE — 99213 OFFICE O/P EST LOW 20 MIN: CPT | Mod: PBBFAC,PN | Performed by: INTERNAL MEDICINE

## 2017-04-10 PROCEDURE — 99999 PR PBB SHADOW E&M-EST. PATIENT-LVL III: CPT | Mod: PBBFAC,,, | Performed by: INTERNAL MEDICINE

## 2017-04-10 PROCEDURE — 99214 OFFICE O/P EST MOD 30 MIN: CPT | Mod: S$PBB,,, | Performed by: INTERNAL MEDICINE

## 2017-04-10 RX ORDER — HYDROXYZINE HYDROCHLORIDE 25 MG/1
TABLET, FILM COATED ORAL
Refills: 3 | COMMUNITY
Start: 2017-03-31 | End: 2017-08-18

## 2017-04-10 RX ORDER — METOPROLOL TARTRATE 100 MG/1
TABLET ORAL
Qty: 30 TABLET | Refills: 0 | OUTPATIENT
Start: 2017-04-10

## 2017-04-10 RX ORDER — METHOCARBAMOL 750 MG/1
750 TABLET, FILM COATED ORAL 4 TIMES DAILY PRN
Qty: 60 TABLET | Refills: 1 | Status: SHIPPED | OUTPATIENT
Start: 2017-04-10 | End: 2017-06-19 | Stop reason: SDUPTHER

## 2017-04-10 RX ORDER — CARBAMAZEPINE 200 MG/1
TABLET ORAL
Refills: 1 | Status: ON HOLD | COMMUNITY
Start: 2017-03-23 | End: 2021-02-20 | Stop reason: HOSPADM

## 2017-04-10 RX ORDER — ACETAMINOPHEN 500 MG/1
500 CAPSULE, LIQUID FILLED ORAL 3 TIMES DAILY PRN
COMMUNITY
End: 2017-04-13

## 2017-04-10 RX ORDER — RISPERIDONE 3 MG/1
TABLET ORAL
Refills: 1 | Status: ON HOLD | COMMUNITY
Start: 2017-03-23 | End: 2021-02-20 | Stop reason: HOSPADM

## 2017-04-10 RX ORDER — METOPROLOL TARTRATE 100 MG/1
TABLET ORAL
Refills: 0 | COMMUNITY
Start: 2017-03-12 | End: 2017-04-10 | Stop reason: DRUGHIGH

## 2017-04-10 RX ORDER — METOPROLOL TARTRATE 50 MG/1
50 TABLET ORAL
Status: CANCELLED | OUTPATIENT
Start: 2017-04-10

## 2017-04-10 RX ORDER — NEOMYCIN SULFATE, POLYMYXIN B SULFATE AND HYDROCORTISONE 10; 3.5; 1 MG/ML; MG/ML; [USP'U]/ML
3 SUSPENSION/ DROPS AURICULAR (OTIC) 3 TIMES DAILY
Qty: 10 ML | Refills: 0 | Status: SHIPPED | OUTPATIENT
Start: 2017-04-10 | End: 2017-05-15 | Stop reason: SDUPTHER

## 2017-04-10 NOTE — PROGRESS NOTES
"Subjective:       Patient ID: Audrey Natarajan is a 46 y.o. female.    Chief Complaint: Otalgia (left)    HPI Comments: Right ear pain x three days    HPI: 47 y/o w/ morbid obesity s/p partial gastrectomy, HTN, chronic lower back pain presents with three day history of right ear pain. Pain described as "throbbibing" no discharge pain worse with lying on right side. No tinnitus. No medicaitons taken. She does use qtips to clean ears. No fevers/chills. No vision changes no dysphagia. Minimal rhinorhea no sore throat no cought no wheezing    Review of Systems   Constitutional: Negative for activity change, appetite change, fatigue, fever and unexpected weight change.   HENT: Positive for ear pain. Negative for rhinorrhea and sore throat.    Eyes: Negative for discharge and visual disturbance.   Respiratory: Negative for chest tightness, shortness of breath and wheezing.    Cardiovascular: Negative for chest pain, palpitations and leg swelling.   Gastrointestinal: Negative for abdominal pain, constipation and diarrhea.   Endocrine: Negative for cold intolerance and heat intolerance.   Genitourinary: Negative for dysuria and hematuria.   Musculoskeletal: Positive for arthralgias and back pain. Negative for joint swelling and neck stiffness.   Skin: Negative for rash.   Neurological: Negative for dizziness, syncope, weakness and headaches.   Psychiatric/Behavioral: Negative for suicidal ideas.       Objective:     Vitals:    04/10/17 0921   BP: 138/78   BP Location: Left arm   Patient Position: Sitting   BP Method: Manual   Pulse: 82   Resp: 17   Temp: 98.3 °F (36.8 °C)   TempSrc: Oral   SpO2: 97%   Weight: 97.7 kg (215 lb 6.2 oz)   Height: 5' 6" (1.676 m)          Physical Exam   Constitutional: She is oriented to person, place, and time. She appears well-developed and well-nourished.   HENT:   Head: Normocephalic and atraumatic.   Mid portion of right external canal with erythema without crusting or ulceration. No " pain with manipulation of auricle. No drainage, TM is intact   Eyes: Conjunctivae are normal. Pupils are equal, round, and reactive to light.   Neck: Normal range of motion.   Cardiovascular: Normal rate and regular rhythm.  Exam reveals no gallop and no friction rub.    No murmur heard.  Pulmonary/Chest: Effort normal and breath sounds normal. She has no wheezes. She has no rales.   Abdominal: Soft. Bowel sounds are normal. There is no tenderness. There is no rebound and no guarding.   Musculoskeletal: Normal range of motion. She exhibits no edema or tenderness.   Neurological: She is alert and oriented to person, place, and time. No cranial nerve deficit.   Skin: Skin is warm and dry.   Psychiatric: She has a normal mood and affect.       Assessment:       1. Acute noninfective otitis externa of left ear, unspecified type    2. Left sided sciatica        Plan:    1. Dry ear precautions cortiosporin for anti inflammatory effect    2. Prn muscler relaxer and NSAID.

## 2017-04-12 ENCOUNTER — HOSPITAL ENCOUNTER (EMERGENCY)
Facility: HOSPITAL | Age: 47
Discharge: HOME | End: 2017-04-12
Attending: EMERGENCY MEDICINE
Payer: MEDICAID

## 2017-04-12 VITALS
HEART RATE: 80 BPM | BODY MASS INDEX: 34.23 KG/M2 | RESPIRATION RATE: 20 BRPM | WEIGHT: 213 LBS | TEMPERATURE: 99 F | DIASTOLIC BLOOD PRESSURE: 70 MMHG | OXYGEN SATURATION: 98 % | HEIGHT: 66 IN | SYSTOLIC BLOOD PRESSURE: 140 MMHG

## 2017-04-12 DIAGNOSIS — H60.502 ACUTE OTITIS EXTERNA OF LEFT EAR, UNSPECIFIED TYPE: ICD-10-CM

## 2017-04-12 DIAGNOSIS — H92.02 LEFT EAR PAIN: Primary | ICD-10-CM

## 2017-04-12 LAB
ALBUMIN SERPL BCP-MCNC: 3.7 G/DL
ALP SERPL-CCNC: 57 U/L
ALT SERPL W/O P-5'-P-CCNC: 15 U/L
ANION GAP SERPL CALC-SCNC: 7 MMOL/L
AST SERPL-CCNC: 12 U/L
B-HCG UR QL: NEGATIVE
BASOPHILS # BLD AUTO: 0.11 K/UL
BASOPHILS NFR BLD: 1.1 %
BILIRUB SERPL-MCNC: 0.3 MG/DL
BUN SERPL-MCNC: 15 MG/DL
CALCIUM SERPL-MCNC: 9.3 MG/DL
CHLORIDE SERPL-SCNC: 102 MMOL/L
CO2 SERPL-SCNC: 30 MMOL/L
CREAT SERPL-MCNC: 0.6 MG/DL
CTP QC/QA: YES
DIFFERENTIAL METHOD: ABNORMAL
EOSINOPHIL # BLD AUTO: 0.6 K/UL
EOSINOPHIL NFR BLD: 6.2 %
ERYTHROCYTE [DISTWIDTH] IN BLOOD BY AUTOMATED COUNT: 15.7 %
EST. GFR  (AFRICAN AMERICAN): >60 ML/MIN/1.73 M^2
EST. GFR  (NON AFRICAN AMERICAN): >60 ML/MIN/1.73 M^2
GLUCOSE SERPL-MCNC: 131 MG/DL
HCT VFR BLD AUTO: 39.1 %
HGB BLD-MCNC: 13.4 G/DL
LYMPHOCYTES # BLD AUTO: 4.9 K/UL
LYMPHOCYTES NFR BLD: 46.7 %
MCH RBC QN AUTO: 29.8 PG
MCHC RBC AUTO-ENTMCNC: 34.3 %
MCV RBC AUTO: 87 FL
MONOCYTES # BLD AUTO: 0.9 K/UL
MONOCYTES NFR BLD: 8.5 %
NEUTROPHILS # BLD AUTO: 3.9 K/UL
NEUTROPHILS NFR BLD: 37.3 %
PLATELET # BLD AUTO: 464 K/UL
PMV BLD AUTO: 9.7 FL
POTASSIUM SERPL-SCNC: 4.5 MMOL/L
PROT SERPL-MCNC: 6.8 G/DL
RBC # BLD AUTO: 4.5 M/UL
SODIUM SERPL-SCNC: 139 MMOL/L
WBC # BLD AUTO: 10.4 K/UL

## 2017-04-12 PROCEDURE — 99283 EMERGENCY DEPT VISIT LOW MDM: CPT | Mod: 25

## 2017-04-12 PROCEDURE — 96372 THER/PROPH/DIAG INJ SC/IM: CPT

## 2017-04-12 RX ORDER — IBUPROFEN 600 MG/1
600 TABLET ORAL EVERY 6 HOURS PRN
Qty: 30 TABLET | Refills: 0 | Status: SHIPPED | OUTPATIENT
Start: 2017-04-12 | End: 2017-05-03

## 2017-04-12 RX ORDER — CIPROFLOXACIN 500 MG/1
500 TABLET ORAL
Status: COMPLETED | OUTPATIENT
Start: 2017-04-12 | End: 2017-04-12

## 2017-04-12 RX ORDER — CIPROFLOXACIN 500 MG/1
500 TABLET ORAL 2 TIMES DAILY
Qty: 20 TABLET | Refills: 0 | Status: SHIPPED | OUTPATIENT
Start: 2017-04-12 | End: 2017-04-22

## 2017-04-12 RX ORDER — KETOROLAC TROMETHAMINE 30 MG/ML
10 INJECTION, SOLUTION INTRAMUSCULAR; INTRAVENOUS
Status: COMPLETED | OUTPATIENT
Start: 2017-04-12 | End: 2017-04-12

## 2017-04-12 RX ADMIN — KETOROLAC TROMETHAMINE 10 MG: 30 INJECTION, SOLUTION INTRAMUSCULAR at 09:04

## 2017-04-12 RX ADMIN — CIPROFLOXACIN HYDROCHLORIDE 500 MG: 500 TABLET, FILM COATED ORAL at 09:04

## 2017-04-12 NOTE — ED PROVIDER NOTES
Encounter Date: 4/12/2017       History     Chief Complaint   Patient presents with    Otalgia     Pt. recently seen for left sided ear pain a week ago c/o increasing pain to left side of neck, jaw, and ear.     Jaw Pain     Review of patient's allergies indicates:   Allergen Reactions    Morphine Other (See Comments)     Patient had a psychotic episode after taking Morphine  Agitation, hallucinations    Penicillins Anaphylaxis     itching     HPI Comments: 46-year-old female with numerous comorbidities, including morbid obesity, hypertension, low back pain, COPD, diabetes mellitus, DVT, PE, coronary artery disease, bipolar disorder, depression, presents the ED with chief complaint left ear pain ×5 days.  Patient was recently seen by Dr. Man Pena for similar complaint 2 days ago.  She was given Polytrim otic drops and diagnosed with otitis externa.  Today she presents with increasing pain, and complaints of facial swelling.  She admits to left face pain and left postauricular pain.  She denies fevers/chills.  She denies neck stiffness, or difficulty with range of motion.  She denies ear discharge.  She states pain is progressed from just her ear to the surrounding area of her ear and also her cheek and jaw.  No difficulty with mastication.  No alleviating or exacerbating factors.  Symptoms are constant.  She denies dental pain or jaw stiffness.    The history is provided by the patient and medical records.     Past Medical History:   Diagnosis Date    Arthritis     Asthma     Bipolar 1 disorder     COPD (chronic obstructive pulmonary disease)     Coronary artery disease     A fib    Depression     bipolar manic depresson    Diabetes mellitus     DVT of lower extremity, bilateral July 2013    bilateral LE DVT. Estelita filter placed.     Encounter for blood transfusion     History of blood clots 1. Left Leg=2003; 2.Bilateral Groin=Blood Clots= 5 or 6/ 2013 & 7/2013; 3. LLL of Lung=7/2013;  4. Lt.  "Lower Leg=2013.     Pt. had 1st Blood Clot after Jkjlbksaxbjt=9053, & Last=. [place] Filter= Rt.Lateral Neck.    HTN (hypertension) 2013    Pt states that she does not have hypertension    Hypercholesteremia     Irregular heartbeat     Neuromuscular disorder     neuropathy feet    PE (pulmonary embolism) 2013     bilat LE DVT.     Restless leg syndrome      Past Surgical History:   Procedure Laterality Date    ABDOMINAL SURGERY      BILATERAL OOPHORECTOMY Bilateral 2015    Green' s filter Right 2012    Right Neck & Tunneled Down.    HERNIA REPAIR      "[place] of Hernias Repaires around th [place]ly Button.", pt. states    OVARIAN CYST REMOVAL  3/13/2014    AL REMOVAL OF OVARY/TUBE(S)      SPLENECTOMY, TOTAL  2003    TONSILLECTOMY      as a child    TYMPANOSTOMY TUBE PLACEMENT      VEIN SURGERY      Lt leg     Family History   Problem Relation Age of Onset    Hypertension Father     Diabetes Father     Heart disease Father     Cataracts Father     Diabetes Paternal Grandfather     Heart disease Paternal Grandfather     No Known Problems Mother     Ovarian cancer Maternal Grandmother       from this. ? age     No Known Problems Sister     No Known Problems Brother     No Known Problems Maternal Aunt     No Known Problems Maternal Uncle     No Known Problems Paternal Aunt     No Known Problems Paternal Uncle     No Known Problems Maternal Grandfather     No Known Problems Paternal Grandmother     Uterine cancer Neg Hx     Breast cancer Neg Hx     Colon cancer Neg Hx     Amblyopia Neg Hx     Blindness Neg Hx     Cancer Neg Hx     Glaucoma Neg Hx     Macular degeneration Neg Hx     Retinal detachment Neg Hx     Strabismus Neg Hx     Stroke Neg Hx     Thyroid disease Neg Hx      Social History   Substance Use Topics    Smoking status: Current Every Day Smoker     Packs/day: 1.50     Years: 25.00     Types: Cigarettes     Last attempt to " quit: 5/25/2016    Smokeless tobacco: Never Used    Alcohol use No     Review of Systems   Constitutional: Negative for activity change, appetite change, chills and fever.   HENT: Positive for ear pain (Left) and facial swelling. Negative for drooling, ear discharge, sore throat and trouble swallowing.    Eyes: Negative for photophobia, pain and discharge.   Respiratory: Negative for apnea, cough, chest tightness, shortness of breath and wheezing.    Cardiovascular: Negative for chest pain, palpitations and leg swelling.   Gastrointestinal: Negative for abdominal pain, nausea and vomiting.   Endocrine: Negative.    Genitourinary: Negative.    Musculoskeletal: Negative for neck pain and neck stiffness.   Skin: Negative for color change, rash and wound.   Allergic/Immunologic: Negative.    Neurological: Negative for dizziness, syncope, weakness, light-headedness and headaches.   Hematological: Negative.    Psychiatric/Behavioral: Negative.    All other systems reviewed and are negative.      Physical Exam   Initial Vitals   BP Pulse Resp Temp SpO2   04/12/17 0829 04/12/17 0829 04/12/17 0829 04/12/17 0829 04/12/17 0829   144/67 78 17 97.8 °F (36.6 °C) 99 %     Physical Exam    Constitutional: Vital signs are normal. She appears well-developed and well-nourished. She is not diaphoretic. She is cooperative.  Non-toxic appearance. She does not have a sickly appearance. She does not appear ill. No distress.   HENT:   Head: Normocephalic and atraumatic.   Right Ear: Tympanic membrane, external ear and ear canal normal.   Left Ear: External ear normal.   Mouth/Throat: Oropharynx is clear and moist. No oropharyngeal exudate.   Left TM dull, with some ear canal exudate.  No erythema.  No perforation.  She is tender to palpation postauricular area, also tender just anterior to the left ear.  No significant preauricular or postauricular swelling.  Full range of motion of jaw.  No trismus.  No oropharyngeal exudate or swelling.    Eyes: Conjunctivae and EOM are normal.   Neck: Normal range of motion. Neck supple. No tracheal deviation present.   Cardiovascular: Normal heart sounds.   Pulmonary/Chest: Breath sounds normal. No respiratory distress. She has no wheezes.   Abdominal: Soft. Bowel sounds are normal. She exhibits no distension. There is no tenderness.   Musculoskeletal: Normal range of motion. She exhibits no tenderness.   Lymphadenopathy:     She has no cervical adenopathy.   Neurological: She is alert and oriented to person, place, and time. She has normal strength.   Skin: Skin is warm and dry. No rash and no abscess noted. No erythema.   Psychiatric: She has a normal mood and affect. Her behavior is normal. Judgment and thought content normal.         ED Course   Procedures  Labs Reviewed   CBC W/ AUTO DIFFERENTIAL - Abnormal; Notable for the following:        Result Value    RDW 15.7 (*)     Platelets 464 (*)     Lymph # 4.9 (*)     Eos # 0.6 (*)     Gran% 37.3 (*)     All other components within normal limits   COMPREHENSIVE METABOLIC PANEL - Abnormal; Notable for the following:     CO2 30 (*)     Glucose 131 (*)     Anion Gap 7 (*)     All other components within normal limits   POCT URINE PREGNANCY             Medical Decision Making:   Initial Assessment:   46-year-old female chief complaint left ear pain, recently seen by her PCP, but now has increasing pain over the past few days.  Differential Diagnosis:   Otitis externa, otitis media, malignant otitis externa  Clinical Tests:   Lab Tests: Ordered and Reviewed  The following lab test(s) were unremarkable: CBC, CMP and UPT  ED Management:  Patient overall well-appearing, in no acute distress, afebrile, vitals within normal limits.  Patient's chief complaint is increasing pain to her left ear.  She is currently being treated for otitis externa by her primary care physician.  She saw Dr. Pena 2 days ago, and was discharged with Cortisporin otic drops for otitis externa.   She has been using drops as prescribed.    On physical exam she continues to have full range of motion of her jaw, no trouble with mastication, no headache, no dental pain.  There is no significant preauricular or postauricular swelling.  There is no erythema or warmth. There is no evidence of cellulitis.  At this time, I do not suspect mastoiditis or malignant otitis externa.  However, she has begun to fail topical therapy, so I do think we need to give her some by mouth antibiotics to assist in the treatment of this infection.  Patient understands and agrees this plan.  I've asked her to follow with her primary care physician in 2 days for reevaluation of symptoms.  I've also given her ENT information to be able to follow-up as well.  I do think she would benefit from going to see ENT if the pain persists.  I've asked her to go ahead and call Dr. Bains, so that he can see her and evaluate her ear pain.  I've asked her to return to the emergency department if problems occur.  Other:   I have discussed this case with another health care provider.       <> Summary of the Discussion: I have discussed this case with .                   ED Course     Clinical Impression:   The primary encounter diagnosis was Left ear pain. A diagnosis of Acute otitis externa of left ear, unspecified type was also pertinent to this visit.    Disposition:   Disposition: Discharged  Condition: Stable       Issac Rosas PA-C  04/12/17 1908

## 2017-04-12 NOTE — ED AVS SNAPSHOT
[facility]  [address] [place] [address]               Audrey Natarajan   2017  8:31 AM   ED    Description:  Female : 1970   Department:  [facility]           Your Care was Coordinated By:     Provider Role From To    Shaggy Sanchez MD Attending Provider 1740 17    Karlos Garrett MD Attending Provider 17 --    MARILY AguilarC Physician Assistant 17 --      Reason for Visit     Otalgia     Jaw Pain           Diagnoses this Visit        Comments    Left ear pain    -  Primary     Acute otitis externa of left ear, unspecified type           ED Disposition     ED Disposition Condition Comment    Discharge             To Do List           Follow-up Information     Follow up with Marissa Rivera MD In 2 days.    Specialty:  Internal Medicine    Why:  For reevaluation of symptoms.    Contact information:    [address]  [place] [address]  [phone]          Follow up with Raghu Bains MD. Schedule an appointment as soon as possible for a visit in 2 days.    Specialty:  Otolaryngology    Why:  Make appointment with Dr. Bains for reevaluation of symptoms.    Contact information:    [address]  [facility] [address] [phone]          Follow up with [facility].    Specialty:  Emergency Medicine    Why:  As needed, If symptoms worsen    Contact information:    [address] [place] [address] [phone]       These Medications        Disp Refills Start End    ciprofloxacin HCl (CIPRO) 500 MG tablet 20 tablet 0 2017    Take 1 tablet (500 mg total) by mouth 2 (two) times daily. - Oral    Pharmacy: [facility] [address] [place] [address] [place] [address] [place] AT [facility] Ph #: [phone]       ibuprofen (ADVIL,MOTRIN) 600 MG tablet 30 tablet 0 2017     Take 1 tablet (600 mg total) by mouth  every 6 (six) hours as needed for Pain. - Oral    Pharmacy: [facility] [address] [place] [address] [place] - [address] [place] [address] [place] [facility]: [phone]         [facility]     [facility] Care [facility] - 24/7 Assistance  Unless otherwise directed by your provider, please contact [facility], our nurse care line that is available for 24/7 assistance.     Registered nurses in the [facility] provide: appointment scheduling, clinical advisement, health education, and other advisory services.  Call: [phone] (toll free)               Medications           Message regarding Medications     Verify the changes and/or additions to your medication regime listed below are the same as discussed with your clinician today.  If any of these changes or additions are incorrect, please notify your healthcare provider.        START taking these NEW medications        Refills    ciprofloxacin HCl (CIPRO) 500 MG tablet 0    Sig: Take 1 tablet (500 mg total) by mouth 2 (two) times daily.    Class: Print    Route: Oral    ibuprofen (ADVIL,MOTRIN) 600 MG tablet 0    Sig: Take 1 tablet (600 mg total) by mouth every 6 (six) hours as needed for Pain.    Class: Print    Route: Oral      These medications were administered today        Dose Freq    ketorolac injection 10 mg 10 mg ED 1 Time    Sig: Inject 10 mg into the muscle ED 1 Time.    Class: Normal    Route: Intramuscular    Cosign for Ordering: Required by Karlos Garrett MD    ciprofloxacin HCl tablet 500 mg 500 mg ED 1 Time    Sig: Take 1 tablet (500 mg total) by mouth ED 1 Time.    Class: Normal    Route: Oral    Cosign for Ordering: Required by Karlos Garrett MD           Verify that the below list of medications is an accurate representation of the medications you are currently taking.  If none reported, the list may be blank. If incorrect, please contact your healthcare provider. Carry this list with you in case of  emergency.           Current Medications     fluticasone (FLONASE) 50 mcg/actuation nasal spray 1 spray by Each Nare route once daily.    acetaminophen (PAIN RELIEVER) 500 mg Cap Take 500 capsules by mouth 3 (three) times daily as needed.    albuterol (PROAIR HFA) 90 mcg/actuation inhaler Inhale 1 puff into the lungs 4 (four) times daily as needed.    aspirin (ECOTRIN) 81 MG EC tablet Take 81 mg by mouth.    b complex vitamins tablet Take 1 tablet by mouth 2 (two) times daily.     blood-glucose meter kit Use as instructed    carbamazepine (TEGRETOL) 200 mg tablet TK 2 TS PO BID    ciprofloxacin HCl (CIPRO) 500 MG tablet Take 1 tablet (500 mg total) by mouth 2 (two) times daily.    clonazePAM (KLONOPIN) 0.5 MG tablet 2 (two) times daily.     clonazePAM (KLONOPIN) 1 MG tablet Take 1 tablet (1 mg total) by mouth every evening.    cyanocobalamin (VITAMIN B-12) 1000 MCG tablet Take 100 mcg by mouth once daily.    fish oil-omega-3 fatty acids 300-1,000 mg capsule Take 2 g by mouth once daily. Instructed to stop 5-7 days before surgery (8/11/16).    furosemide (LASIX) 20 MG tablet TAKE 1 TABLET(20 MG) BY MOUTH EVERY DAY    gabapentin (NEURONTIN) 600 MG tablet Take 2 tablets by mouth 3 (three) times daily.    hydrOXYzine HCl (ATARAX) 25 MG tablet TK 1 TO 2 TS PO QHS PRF ITCHING    ibuprofen (ADVIL,MOTRIN) 600 MG tablet Take 1 tablet (600 mg total) by mouth every 6 (six) hours as needed for Pain.    lancets (ACCU-CHEK SOFTCLIX LANCETS) Misc 1 each by Misc.(Non-Drug; Combo Route) route once daily.    lisinopril (PRINIVIL,ZESTRIL) 5 MG tablet TAKE 1 TABLET(5 MG) BY MOUTH EVERY DAY    meloxicam (MOBIC) 7.5 MG tablet TAKE 1 TABLET BY MOUTH TWICE DAILY    metformin (GLUCOPHAGE) 1000 MG tablet TAKE 1 TABLET BY MOUTH TWICE DAILY WITH MEALS    methocarbamol (ROBAXIN) 750 MG Tab Take 1 tablet (750 mg total) by mouth 4 (four) times daily as needed.    metoprolol tartrate (LOPRESSOR) 50 MG tablet Take 50 mg by mouth. Take 1/2 tablet  "twice a day    mometasone-formoterol (DULERA) 100-5 mcg/actuation HFAA Inhale 2 puffs into the lungs 2 (two) times daily.    multivitamin (THERAGRAN) per tablet Take 1 tablet by mouth every morning.    neomycin-polymyxin-hydrocortisone (CORTISPORIN) 3.5-10,000-1 mg/mL-unit/mL-% otic suspension Place 3 drops into the left ear 3 (three) times daily.    OXYGEN-AIR DELIVERY SYSTEMS MISC 2 L by Misc.(Non-Drug; Combo Route) route daily as needed (and Mostly at Night.).     pantoprazole (PROTONIX) 40 MG tablet Take 1 tablet (40 mg total) by mouth once daily.    risperidone (RISPERDAL) 3 MG Tab pt states taking twice a day    TRUETEST TEST STRIPS Strp TEST BLOOD SUGAR FOUR TIMES DAILY           Clinical Reference Information           Your Vitals Were     BP Pulse Temp Resp Height Weight    144/67 (BP Location: Right arm) 78 97.8 °F (36.6 °C) (Oral) 17 5' 6" (1.676 m) 96.6 kg (213 lb)    Last Period SpO2 BMI          11/01/2011 (LMP Unknown) 99% 34.38 kg/m2        Allergies as of 4/12/2017        Reactions    Morphine Other (See Comments)    Patient had a psychotic episode after taking Morphine  Agitation, hallucinations    Penicillins Anaphylaxis    itching      Immunizations Administered on Date of Encounter - 4/12/2017     None      ED Micro, Lab, POCT     Start Ordered       Status Ordering Provider    04/12/17 0854 04/12/17 0853  CBC auto differential  STAT      Final result     04/12/17 0854 04/12/17 0853  Comprehensive metabolic panel  STAT      Final result     04/12/17 0853 04/12/17 0853  POCT urine pregnancy  Once      Final result       ED Imaging Orders     None        Discharge Instructions         External Ear Infection (Adult)    External otitis (also called swimmers ear) is an infection in the ear canal. It is often caused by bacteria or fungus. It can occur a few days after water gets trapped in the ear canal (from swimming or bathing). It can also occur after cleaning too deeply in the ear canal with a " cotton swab or other object. Sometimes, hair care products get into the ear canal and cause this problem.  Symptoms can include pain, fever, itching, redness, drainage, or swelling of the ear canal. Temporary hearing loss may also occur.  Home care  · Do not try to clean the ear canal. This can push pus and bacteria deeper into the canal.  · Use prescribed ear drops as directed. These help reduce swelling and fight the infection. If an ear wick was placed in the ear canal, apply drops right onto the end of the wick. The wick will draw the medication into the ear canal even if it is swollen closed.  · A cotton ball may be loosely placed in the outer ear to absorb any drainage.  · You may use acetaminophen or ibuprofen to control pain, unless another medication was prescribed. Note: If you have chronic liver or kidney disease or ever had a stomach ulcer or GI bleeding, talk to your health care provider before taking any of these medications.  · Do not allow water to get into your ear when bathing. Also, avoid swimming until the infection has cleared.  Prevention  · Keep your ears dry. This helps lower the risk of infection. Dry your ears with a towel or hair dryer after getting wet. Also, use ear plugs when swimming.  · Do not stick any objects in the ear to remove wax.  · If you feel water trapped in your ear, use ear drops right away. You can get these drops over the counter at most drugstores. They work by removing water from the ear canal.  Follow-up care  Follow up with your health care provider in one week, or as advised.  When to seek medical advice  Call your health care provider right away if any of these occur:  · Ear pain becomes worse or doesnt improve after 3 days of treatment  · Redness or swelling of the outer ear occurs or gets worse  · Headache  · Painful or stiff neck  · Drowsiness or confusion  · Fever of 100.4ºF (38ºC) or higher, or as directed by your health care provider  · Seizure  Date Last  Reviewed: 3/22/2015  © 4841-1804 [employer]. [address] [place] [address], [place] [address]. All rights reserved. This information is not intended as a substitute for professional medical care. Always follow your healthcare professional's instructions.          Your Scheduled Appointments     Aug 10, 2017  9:00 AM CDT   Established Patient with Corey Mauro MD   [facility], [facility] ([facility])    [address] [facility] [address]  [facility] [address]   [phone]              [facility] Sign-Up     Activating your [facility] account is as easy as 1-2-3!     1) Visit [email or web address], select Sign Up Now, enter this activation code and your date of birth, then select Next.  Activation code not generated  Current Patient Portal Status: Account disabled      2) Create a username and password to use when you visit [facility] in the future and select a security question in case you lose your password and select Next.    3) Enter your e-mail address and click Sign Up!    Additional Information  If you have questions, please e-mail Bills [email or web address] or call [phone] to talk to our [facility] staff. Remember, [email or web address] is NOT to be used for urgent needs. For medical emergencies, dial 911.          [facility] complies with applicable Federal civil rights laws and does not discriminate on the basis of race, color, national origin, age, disability, or sex.        Language Assistance Services     ATTENTION: Language assistance services are available, free of charge. Please call [phone].      ATENCIÓN: Si habla español, tiene a fine disposición servicios gratuitos de asistencia lingüística. Llame al [phone].     CHÚ Ý: N?u b?n nói Ti?ng Vi?t, có các d?ch v? h? tr? ngôn ng? mi?n phí dành cho b?n. G?i s? [phone].

## 2017-04-12 NOTE — DISCHARGE INSTRUCTIONS
External Ear Infection (Adult)    External otitis (also called swimmers ear) is an infection in the ear canal. It is often caused by bacteria or fungus. It can occur a few days after water gets trapped in the ear canal (from swimming or bathing). It can also occur after cleaning too deeply in the ear canal with a cotton swab or other object. Sometimes, hair care products get into the ear canal and cause this problem.  Symptoms can include pain, fever, itching, redness, drainage, or swelling of the ear canal. Temporary hearing loss may also occur.  Home care  · Do not try to clean the ear canal. This can push pus and bacteria deeper into the canal.  · Use prescribed ear drops as directed. These help reduce swelling and fight the infection. If an ear wick was placed in the ear canal, apply drops right onto the end of the wick. The wick will draw the medication into the ear canal even if it is swollen closed.  · A cotton ball may be loosely placed in the outer ear to absorb any drainage.  · You may use acetaminophen or ibuprofen to control pain, unless another medication was prescribed. Note: If you have chronic liver or kidney disease or ever had a stomach ulcer or GI bleeding, talk to your health care provider before taking any of these medications.  · Do not allow water to get into your ear when bathing. Also, avoid swimming until the infection has cleared.  Prevention  · Keep your ears dry. This helps lower the risk of infection. Dry your ears with a towel or hair dryer after getting wet. Also, use ear plugs when swimming.  · Do not stick any objects in the ear to remove wax.  · If you feel water trapped in your ear, use ear drops right away. You can get these drops over the counter at most drugstores. They work by removing water from the ear canal.  Follow-up care  Follow up with your health care provider in one week, or as advised.  When to seek medical advice  Call your health care provider right away if  any of these occur:  · Ear pain becomes worse or doesnt improve after 3 days of treatment  · Redness or swelling of the outer ear occurs or gets worse  · Headache  · Painful or stiff neck  · Drowsiness or confusion  · Fever of 100.4ºF (38ºC) or higher, or as directed by your health care provider  · Seizure  Date Last Reviewed: 3/22/2015  © 7152-2299 [employer]. [address] [place] [address], [place] [id number]. All rights reserved. This information is not intended as a substitute for professional medical care. Always follow your healthcare professional's instructions.

## 2017-04-13 ENCOUNTER — OFFICE VISIT (OUTPATIENT)
Dept: FAMILY MEDICINE | Facility: CLINIC | Age: 47
End: 2017-04-13
Payer: MEDICAID

## 2017-04-13 VITALS
RESPIRATION RATE: 18 BRPM | DIASTOLIC BLOOD PRESSURE: 76 MMHG | OXYGEN SATURATION: 96 % | BODY MASS INDEX: 34.93 KG/M2 | WEIGHT: 217.38 LBS | TEMPERATURE: 98 F | HEIGHT: 66 IN | HEART RATE: 88 BPM | SYSTOLIC BLOOD PRESSURE: 124 MMHG

## 2017-04-13 DIAGNOSIS — H92.02 LEFT EAR PAIN: ICD-10-CM

## 2017-04-13 DIAGNOSIS — K21.9 GASTROESOPHAGEAL REFLUX DISEASE, ESOPHAGITIS PRESENCE NOT SPECIFIED: Primary | ICD-10-CM

## 2017-04-13 DIAGNOSIS — K08.89 TOOTHACHE: ICD-10-CM

## 2017-04-13 DIAGNOSIS — I10 ESSENTIAL HYPERTENSION: ICD-10-CM

## 2017-04-13 PROCEDURE — 99999 PR PBB SHADOW E&M-EST. PATIENT-LVL V: CPT | Mod: PBBFAC,,, | Performed by: NURSE PRACTITIONER

## 2017-04-13 PROCEDURE — 99215 OFFICE O/P EST HI 40 MIN: CPT | Mod: PBBFAC,PN | Performed by: NURSE PRACTITIONER

## 2017-04-13 PROCEDURE — 99213 OFFICE O/P EST LOW 20 MIN: CPT | Mod: S$PBB,,, | Performed by: NURSE PRACTITIONER

## 2017-04-13 RX ORDER — LANSOPRAZOLE 30 MG/1
30 CAPSULE, DELAYED RELEASE ORAL DAILY
Qty: 30 CAPSULE | Refills: 5 | Status: SHIPPED | OUTPATIENT
Start: 2017-04-13 | End: 2017-06-19 | Stop reason: CLARIF

## 2017-04-13 RX ORDER — HYDROCODONE BITARTRATE AND ACETAMINOPHEN 5; 325 MG/1; MG/1
1 TABLET ORAL 2 TIMES DAILY PRN
Qty: 20 TABLET | Refills: 0 | Status: SHIPPED | OUTPATIENT
Start: 2017-04-13 | End: 2017-04-23

## 2017-04-13 RX ORDER — METOPROLOL TARTRATE 25 MG/1
25 TABLET, FILM COATED ORAL DAILY
Qty: 30 TABLET | Refills: 2 | Status: SHIPPED | OUTPATIENT
Start: 2017-04-13 | End: 2017-07-18 | Stop reason: SDUPTHER

## 2017-04-13 NOTE — PROGRESS NOTES
HPI Ms Natarajan is here for left ear pain; she was seen by Dr Pena who prescribed ear drops; she was seen in the er twice yesterday; the er prescribed cipro, ibuprofen, and gave an injection of Toradol. The patient reports relief with the Toradol, but the pain came back when it wore off. The patient reports the ear pain started Wednesday. She describes the pain as pulsating and rates it 9/10. The pain is constant and interrupts her sleep. The pain radiates to her jaw, teeth, and neck. She is taking the cipro and ibuprofen the er prescribed        Review of Systems   Constitutional: Negative for appetite change and fever.   HENT: Positive for ear pain. Negative for congestion.   Dental Caries  Left ear pain since Wednesday   Eyes: Negative for discharge.   Reports watery eyes   Respiratory: Negative for cough, chest tightness and shortness of breath.   Cardiovascular: Negative for chest pain and palpitations.   Gastrointestinal: Negative for abdominal pain and nausea.   Genitourinary: Negative for difficulty urinating and dysuria.   Musculoskeletal:   Chronic knee and hip pain, sciatica   Skin: Negative for rash and wound.   Neurological: Negative for dizziness, weakness and headaches.   Psychiatric/Behavioral: Negative for suicidal ideas.       Objective:      Physical Exam   Constitutional: She is oriented to person, place, and time. She appears well-developed and well-nourished.   HENT:   Head: Normocephalic.   Right Ear: External ear normal.   Nose: Nose normal.   Left ear bulging tympanic membrane   Eyes: Pupils are equal, round, and reactive to light.   Neck: Normal range of motion.   Cardiovascular: Normal rate and regular rhythm.   No murmur heard.  Pulmonary/Chest: Effort normal and breath sounds normal. No respiratory distress. She has no wheezes. She has no rales.   Abdominal: Soft. Bowel sounds are normal. There is no tenderness.   Musculoskeletal: Normal range of motion.   Neurological: She is alert and  oriented to person, place, and time.   Skin: Skin is warm and dry.   Psychiatric: She has a normal mood and affect. Her behavior is normal.       Assessment:       1. Gastroesophageal reflux disease, esophagitis presence not specified    2. Toothache    3. Left ear pain    4. Essential hypertension        Plan:          Diagnoses and all orders for this visit:     Gastroesophageal reflux disease, esophagitis presence not specified  - lansoprazole (PREVACID) 30 MG capsule; Take 1 capsule (30 mg total) by mouth once daily.     Toothache  - hydrocodone-acetaminophen 5-325mg (NORCO) 5-325 mg per tablet; Take 1 tablet by mouth 2 (two) times daily as needed for Pain.     Left ear pain     Essential hypertension  - metoprolol tartrate (LOPRESSOR) 25 MG tablet; Take 1 tablet (25 mg total) by mouth once daily.     - instructed to check her blood sugar before all meals and before bed  - instructed to check blood pressure and keep a log  - instructed to elevate ble to alleviate edema  - if develop fever >100.4 return to the clinic   - complete all antibiotics

## 2017-04-13 NOTE — MR AVS SNAPSHOT
[facility]  [address]  [place] [address]  Phone: [phone]                  Audrey Natarajan   2017 9:20 AM   Office Visit    Description:  Female : 1970   Provider:  Gerda Morelos, NP-C   Department:  [facility]           Reason for Visit     Otalgia     Jaw Pain     Facial Swelling     Weight Check           Diagnoses this Visit        Comments    Gastroesophageal reflux disease, esophagitis presence not specified    -  Primary     Toothache         Left ear pain         Essential hypertension                To Do List           Goals (5 Years of Data)     None       These Medications        Disp Refills Start End    hydrocodone-acetaminophen 5-325mg (NORCO) 5-325 mg per tablet 20 tablet 0 2017    Take 1 tablet by mouth 2 (two) times daily as needed for Pain. - Oral    Pharmacy: [facility] [address] AT [facility] Ph #: [phone]       lansoprazole (PREVACID) 30 MG capsule 30 capsule 5 2017    Take 1 capsule (30 mg total) by mouth once daily. - Oral    Pharmacy: [facility] [address] AT [facility] Ph #: [phone]       metoprolol tartrate (LOPRESSOR) 25 MG tablet 30 tablet 2 2017     Take 1 tablet (25 mg total) by mouth once daily. - Oral    Pharmacy: [facility] [address] AT [facility] Ph #: [phone]         [facility]     [facility] On [facility] Nurse Care Line -  Assistance  Unless otherwise directed by your provider, please contact [facility], our nurse care line that is available for  assistance.     Registered nurses in the [facility] provide: appointment scheduling, clinical advisement, health education, and other advisory services.  Call: [phone] (toll free)               Medications            Message regarding Medications     Verify the changes and/or additions to your medication regime listed below are the same as discussed with your clinician today.  If any of these changes or additions are incorrect, please notify your healthcare provider.        START taking these NEW medications        Refills    hydrocodone-acetaminophen 5-325mg (NORCO) 5-325 mg per tablet 0    Sig: Take 1 tablet by mouth 2 (two) times daily as needed for Pain.    Class: Normal    Route: Oral    lansoprazole (PREVACID) 30 MG capsule 5    Sig: Take 1 capsule (30 mg total) by mouth once daily.    Class: Normal    Route: Oral      CHANGE how you are taking these medications     Start Taking Instead of    metoprolol tartrate (LOPRESSOR) 25 MG tablet metoprolol tartrate (LOPRESSOR) 50 MG tablet    Dosage:  Take 1 tablet (25 mg total) by mouth once daily. Dosage:  Take 50 mg by mouth. Take 1/2 tablet twice a day    Reason for Change:  Reorder       STOP taking these medications     acetaminophen (PAIN RELIEVER) 500 mg Cap Take 500 capsules by mouth 3 (three) times daily as needed.    pantoprazole (PROTONIX) 40 MG tablet Take 1 tablet (40 mg total) by mouth once daily.           Verify that the below list of medications is an accurate representation of the medications you are currently taking.  If none reported, the list may be blank. If incorrect, please contact your healthcare provider. Carry this list with you in case of emergency.           Current Medications     ALBUTEROL SULFATE (VENTOLIN INHL) Inhale into the lungs.    aspirin (ECOTRIN) 81 MG EC tablet Take 81 mg by mouth.    b complex vitamins tablet Take 1 tablet by mouth 2 (two) times daily.     carbamazepine (TEGRETOL) 200 mg tablet TK 2 TS PO BID    ciprofloxacin HCl (CIPRO) 500 MG tablet Take 1 tablet (500 mg total) by mouth 2 (two) times daily.    clonazePAM (KLONOPIN) 0.5 MG tablet 2 (two) times daily.     clonazePAM (KLONOPIN) 1 MG tablet Take 1 tablet (1 mg total) by  mouth every evening.    cyanocobalamin (VITAMIN B-12) 1000 MCG tablet Take 100 mcg by mouth once daily.    fish oil-omega-3 fatty acids 300-1,000 mg capsule Take 2 g by mouth once daily. Instructed to stop 5-7 days before surgery (8/11/16).    fluticasone (FLONASE) 50 mcg/actuation nasal spray 1 spray by Each Nare route once daily.    furosemide (LASIX) 20 MG tablet TAKE 1 TABLET(20 MG) BY MOUTH EVERY DAY    gabapentin (NEURONTIN) 600 MG tablet Take 2 tablets by mouth 3 (three) times daily.    hydrOXYzine HCl (ATARAX) 25 MG tablet TK 1 TO 2 TS PO QHS PRF ITCHING    ibuprofen (ADVIL,MOTRIN) 600 MG tablet Take 1 tablet (600 mg total) by mouth every 6 (six) hours as needed for Pain.    lancets (ACCU-CHEK SOFTCLIX LANCETS) Misc 1 each by Misc.(Non-Drug; Combo Route) route once daily.    lisinopril (PRINIVIL,ZESTRIL) 5 MG tablet TAKE 1 TABLET(5 MG) BY MOUTH EVERY DAY    meloxicam (MOBIC) 7.5 MG tablet TAKE 1 TABLET BY MOUTH TWICE DAILY    metformin (GLUCOPHAGE) 1000 MG tablet TAKE 1 TABLET BY MOUTH TWICE DAILY WITH MEALS    methocarbamol (ROBAXIN) 750 MG Tab Take 1 tablet (750 mg total) by mouth 4 (four) times daily as needed.    metoprolol tartrate (LOPRESSOR) 25 MG tablet Take 1 tablet (25 mg total) by mouth once daily.    mometasone-formoterol (DULERA) 100-5 mcg/actuation HFAA Inhale 2 puffs into the lungs 2 (two) times daily.    multivitamin (THERAGRAN) per tablet Take 1 tablet by mouth every morning.    neomycin-polymyxin-hydrocortisone (CORTISPORIN) 3.5-10,000-1 mg/mL-unit/mL-% otic suspension Place 3 drops into the left ear 3 (three) times daily.    OXYGEN-AIR DELIVERY SYSTEMS MISC 2 L by Misc.(Non-Drug; Combo Route) route daily as needed (and Mostly at Night.).     risperidone (RISPERDAL) 3 MG Tab pt states taking twice a day    TRUETEST TEST STRIPS Strp TEST BLOOD SUGAR FOUR TIMES DAILY    blood-glucose meter kit Use as instructed    hydrocodone-acetaminophen 5-325mg (NORCO) 5-325 mg per tablet Take 1 tablet by  "mouth 2 (two) times daily as needed for Pain.    lansoprazole (PREVACID) 30 MG capsule Take 1 capsule (30 mg total) by mouth once daily.           Clinical Reference Information           Your Vitals Were     BP Pulse Temp Resp Height Weight    124/76 (BP Location: Left arm, Patient Position: Sitting, BP Method: Manual) 88 98 °F (36.7 °C) (Oral) 18 5' 6" (1.676 m) 98.6 kg (217 lb 6 oz)    Last Period SpO2 BMI          11/01/2011 (LMP Unknown) 96% 35.09 kg/m2        Blood Pressure          Most Recent Value    BP  124/76      Allergies as of 4/13/2017     Morphine    Penicillins      Immunizations Administered on Date of Encounter - 4/13/2017     None      [facility] Sign-Up     Activating your [facility] account is as easy as 1-2-3!     1) Visit [email or web address], select Sign Up Now, enter this activation code and your date of birth, then select Next.  Activation code not generated  Current Patient Portal Status: Account disabled      2) Create a username and password to use when you visit [facility] in the future and select a security question in case you lose your password and select Next.    3) Enter your e-mail address and click Sign Up!    Additional Information  If you have questions, please e-mail [email or web address] or call [phone] to talk to our [facility] staff. Remember, [facility] is NOT to be used for urgent needs. For medical emergencies, dial 911.         Instructions    Followup if not improved  Go to ER for new worse or concerning symptoms    Dental Pain    A crack or cavity in a tooth can cause tooth pain. This is because the crack or cavity exposes the sensitive inner area of the tooth. An infection in the gum or the root of the tooth can cause pain and swelling. The pain is often made worse when you drink hot or cold beverages. It can also be worse when you bite on hard foods. Pain may spread from the tooth to your ear or the area of the jaw on the same side.  Home care  Follow these tips when " "caring for yourself at home:  · Avoid hot and cold foods and drinks. Your tooth may be sensitive to changes in temperature.  · Use toothpaste made for sensitive teeth. Brush gently up and down instead of sideways. Brushing sideways can wear away root surfaces if they are exposed.  · If your tooth is chipped or cracked, or if there is a large open cavity, put oil of cloves directly on the tooth to relieve pain. You can buy oil of cloves at drugstores. Some pharmacies carry an over-the-counter "toothache kit." This contains a paste that you can put on the exposed tooth to make it less sensitive.  · Put a cold pack on your jaw over the sore area to help reduce pain.  · You may use over-the-counter medicine to ease pain, unless your doctor prescribed another medicine. If you have chronic liver or kidney disease, talk with your healthcare provider before using acetaminophen or ibuprofen. Also talk with your provider if youve had a stomach ulcer or GI bleeding.  · If you have signs of an infection, you will be given an antibiotic. Take it as directed.  Follow-up care  Follow up with your dentist, or as advised. Your pain may go away with the treatment given today. But only a dentist can fully look at and treat the cause of your pain. This will keep the pain from coming back.  Call 911  Call 911 if any of these occur:  · Unusual drowsiness  · Headache or stiff neck  · Weakness or fainting  · Difficulty swallowing or breathing  When to seek medical advice  Call your health care provider right away if any of these occur:  · Your face becomes swollen or red  · Pain gets worse or spreads to your neck  · Fever of 100.4º F (38.0º C) or higher, or as directed by your healthcare provider  · Pus drains from the tooth  Date Last Reviewed: 10/1/2016  © 3916-8910 [employer]. [address] [place] [address], [place] [address]. All rights reserved. This information is not intended as a substitute for professional medical care. " Always follow your healthcare professional's instructions.             Smoking Cessation     If you would like to quit smoking:   You may be eligible for free services if you are a [place] resident and started smoking cigarettes before September 1, 1988.  Call the Smoking Cessation Trust ([facility]) toll free at ([phone]) [phone] or (459) [phone].   Call 1-800-QUIT-NOW if you do not meet the above criteria.   Contact us via email: [email or web address]   View our website for more information: [email or web address]        Language Assistance Services     ATTENTION: Language assistance services are available, free of charge. Please call [phone].      ATENCIÓN: Si habla joey, tiene a fine disposición servicios gratuitos de asistencia lingüística. Llame al [phone].     CHÚ Ý: N?u b?n nói Ti?ng Vi?t, có các d?ch v? h? tr? ngôn ng? mi?n phí dành cho b?n. G?i s? [phone].         [facility] complies with applicable Federal civil rights laws and does not discriminate on the basis of race, color, national origin, age, disability, or sex.

## 2017-04-13 NOTE — PATIENT INSTRUCTIONS
"Followup if not improved  Go to ER for new worse or concerning symptoms    Dental Pain    A crack or cavity in a tooth can cause tooth pain. This is because the crack or cavity exposes the sensitive inner area of the tooth. An infection in the gum or the root of the tooth can cause pain and swelling. The pain is often made worse when you drink hot or cold beverages. It can also be worse when you bite on hard foods. Pain may spread from the tooth to your ear or the area of the jaw on the same side.  Home care  Follow these tips when caring for yourself at home:  · Avoid hot and cold foods and drinks. Your tooth may be sensitive to changes in temperature.  · Use toothpaste made for sensitive teeth. Brush gently up and down instead of sideways. Brushing sideways can wear away root surfaces if they are exposed.  · If your tooth is chipped or cracked, or if there is a large open cavity, put oil of cloves directly on the tooth to relieve pain. You can buy oil of cloves at drugstores. Some pharmacies carry an over-the-counter "toothache kit." This contains a paste that you can put on the exposed tooth to make it less sensitive.  · Put a cold pack on your jaw over the sore area to help reduce pain.  · You may use over-the-counter medicine to ease pain, unless your doctor prescribed another medicine. If you have chronic liver or kidney disease, talk with your healthcare provider before using acetaminophen or ibuprofen. Also talk with your provider if youve had a stomach ulcer or GI bleeding.  · If you have signs of an infection, you will be given an antibiotic. Take it as directed.  Follow-up care  Follow up with your dentist, or as advised. Your pain may go away with the treatment given today. But only a dentist can fully look at and treat the cause of your pain. This will keep the pain from coming back.  Call 911  Call 911 if any of these occur:  · Unusual drowsiness  · Headache or stiff neck  · Weakness or " fainting  · Difficulty swallowing or breathing  When to seek medical advice  Call your health care provider right away if any of these occur:  · Your face becomes swollen or red  · Pain gets worse or spreads to your neck  · Fever of 100.4º F (38.0º C) or higher, or as directed by your healthcare provider  · Pus drains from the tooth  Date Last Reviewed: 10/1/2016  © 3447-6360 [employer]. [address] [place] [address], [place] [address]. All rights reserved. This information is not intended as a substitute for professional medical care. Always follow your healthcare professional's instructions.

## 2017-04-13 NOTE — MEDICAL/APP STUDENT
Subjective:       Patient ID: Audrey Natarajan is a 46 y.o. female.    Chief Complaint: Otalgia (left); Jaw Pain; Facial Swelling (left side ); and Weight Check    HPI 47 y/o female present to clinic today with complaints of left ear pain; she was seen by Dr Pena who prescribed ear drops; she was seen in the er twice yesterday; the er prescribed cipro, ibuprofen, and gave an injection of Toradol.  The patient reports relief with the Toradol, but the pain came back when it wore off.  The patient reports the ear pain started Wednesday.  She describes the pain as pulsating and rates it 9/10.  The pain is constant and interrupts her sleep.  The pain radiates to her jaw, teeth, and neck.  She is taking the cipro and ibuprofen the er prescribed      Review of Systems   Constitutional: Negative for appetite change and fever.   HENT: Positive for ear pain. Negative for congestion.         Left ear pain since Wednesday   Eyes: Negative for discharge.        Reports watery eyes   Respiratory: Negative for cough, chest tightness and shortness of breath.    Cardiovascular: Negative for chest pain and palpitations.   Gastrointestinal: Negative for abdominal pain and nausea.   Genitourinary: Negative for difficulty urinating and dysuria.   Musculoskeletal:        Chronic knee and hip pain, sciatica   Skin: Negative for rash and wound.   Neurological: Negative for dizziness, weakness and headaches.   Psychiatric/Behavioral: Negative for suicidal ideas.       Objective:      Physical Exam   Constitutional: She is oriented to person, place, and time. She appears well-developed and well-nourished.   HENT:   Head: Normocephalic.   Right Ear: External ear normal.   Nose: Nose normal.   Left ear bulging tympanic membrane   Eyes: Pupils are equal, round, and reactive to light.   Neck: Normal range of motion.   Cardiovascular: Normal rate and regular rhythm.    No murmur heard.  Pulmonary/Chest: Effort normal and breath sounds  normal. No respiratory distress. She has no wheezes. She has no rales.   Abdominal: Soft. Bowel sounds are normal. There is no tenderness.   Musculoskeletal: Normal range of motion.   Neurological: She is alert and oriented to person, place, and time.   Skin: Skin is warm and dry.   Psychiatric: She has a normal mood and affect. Her behavior is normal.       Assessment:       1. Gastroesophageal reflux disease, esophagitis presence not specified    2. Toothache    3. Left ear pain    4. Essential hypertension        Plan:         Audrey was seen today for otalgia, jaw pain, facial swelling and weight check.    Diagnoses and all orders for this visit:    Gastroesophageal reflux disease, esophagitis presence not specified  -     lansoprazole (PREVACID) 30 MG capsule; Take 1 capsule (30 mg total) by mouth once daily.    Toothache  -     hydrocodone-acetaminophen 5-325mg (NORCO) 5-325 mg per tablet; Take 1 tablet by mouth 2 (two) times daily as needed for Pain.    Left ear pain    Essential hypertension  -     metoprolol tartrate (LOPRESSOR) 25 MG tablet; Take 1 tablet (25 mg total) by mouth once daily.    - instructed to check her blood sugar before all meals and before bed  - instructed to check blood pressure and keep a log  - instructed to elevate ble to alleviate edema  - if develop fever >100.4 return to the clinic   - complete all antibiotics

## 2017-04-19 ENCOUNTER — TELEPHONE (OUTPATIENT)
Dept: FAMILY MEDICINE | Facility: CLINIC | Age: 47
End: 2017-04-19

## 2017-04-19 NOTE — TELEPHONE ENCOUNTER
Left message to voicemail [phone] to return call to clinic re: prior authorization for prevacid

## 2017-04-19 NOTE — TELEPHONE ENCOUNTER
----- Message from Gisella Plasencia sent at 4/19/2017  9:57 AM CDT -----  Patient states insurance is requiring a prior auth on lansoprazole (PREVACID) 30 MG capsule. [facility] [address] Thank you!

## 2017-04-21 NOTE — TELEPHONE ENCOUNTER
Spoke with patient. She states that she was on Protonix and the insurance would not pay for medication so prescription was changed to Prevacid. The insurance will not pay for Prevacid. She states that she was given a purple pill in the hospital but is not sure what that medication was.     She states that she was on Protonix for 1-1.5 years. Protonix worked well    Will request PA. Form on NP desk for completion

## 2017-04-21 NOTE — TELEPHONE ENCOUNTER
----- Message from Chemo Reese sent at 4/20/2017  4:25 PM CDT -----  Contact: Self/[phone]  Patient returned staff's call. Thank you.

## 2017-04-27 NOTE — TELEPHONE ENCOUNTER
Spoke with patient re: fax received from [facility] re: PPI pharmacy gives coverage of Generic non preferred PPI only after patient has tried TWO of the preferred alternatives (omeprazole 20mg OTC, Prevacid/ Lansoprazole 24hr 15mg OTC, Nexium 24 hr 20mg OTC) unless the preferred agents are not FDA approved or clinically acceptable to treat the patient's diagnosis    She will try preferred PPI and will call back with outcome

## 2017-05-02 ENCOUNTER — TELEPHONE (OUTPATIENT)
Dept: FAMILY MEDICINE | Facility: CLINIC | Age: 47
End: 2017-05-02

## 2017-05-02 DIAGNOSIS — M54.32 LEFT SIDED SCIATICA: ICD-10-CM

## 2017-05-02 RX ORDER — MUPIROCIN 20 MG/G
OINTMENT TOPICAL
Refills: 1 | COMMUNITY
Start: 2017-04-10 | End: 2017-11-02 | Stop reason: SDUPTHER

## 2017-05-02 RX ORDER — METHOCARBAMOL 750 MG/1
750 TABLET, FILM COATED ORAL 4 TIMES DAILY PRN
Qty: 60 TABLET | Refills: 1 | Status: CANCELLED | OUTPATIENT
Start: 2017-05-02

## 2017-05-02 RX ORDER — DEXAMETHASONE 0.75 MG/1
0.75 TABLET ORAL 2 TIMES DAILY
Refills: 1 | COMMUNITY
Start: 2017-04-20 | End: 2017-06-19 | Stop reason: ALTCHOICE

## 2017-05-02 NOTE — TELEPHONE ENCOUNTER
Prescription given 4- for #60 with 1 additional refill     Left message to [facility] [phone] to return call to clinic re: refill request

## 2017-05-02 NOTE — TELEPHONE ENCOUNTER
----- Message from Gisella Plasencia sent at 5/2/2017  2:47 PM CDT -----  Refill: methocarbamol (ROBAXIN) 750 MG Tab    Patient can be reached at [phone] Thank you!

## 2017-05-03 RX ORDER — MELOXICAM 7.5 MG/1
TABLET ORAL
Qty: 60 TABLET | Refills: 0 | Status: SHIPPED | OUTPATIENT
Start: 2017-05-03 | End: 2017-06-06 | Stop reason: SDUPTHER

## 2017-05-03 NOTE — TELEPHONE ENCOUNTER
----- Message from Yessica Stallings sent at 5/3/2017 11:32 AM CDT -----  Contact: self  Patient returning call per refill request. Please call [phone].     Thanks!

## 2017-05-03 NOTE — TELEPHONE ENCOUNTER
----- Message from Yessica Stallings sent at 5/3/2017 11:32 AM CDT -----  Contact: self  Patient returning call per refill request. Please call [phone].      Thanks!

## 2017-05-03 NOTE — TELEPHONE ENCOUNTER
----- Message from Keyona Howard sent at 5/3/2017 12:39 PM CDT -----  Contact: self  Pt calling to state she got refill of robaxin. Thanks

## 2017-05-15 ENCOUNTER — LAB VISIT (OUTPATIENT)
Dept: LAB | Facility: HOSPITAL | Age: 47
End: 2017-05-15
Attending: INTERNAL MEDICINE
Payer: MEDICAID

## 2017-05-15 ENCOUNTER — OFFICE VISIT (OUTPATIENT)
Dept: FAMILY MEDICINE | Facility: CLINIC | Age: 47
End: 2017-05-15
Payer: MEDICAID

## 2017-05-15 VITALS
HEIGHT: 66 IN | TEMPERATURE: 100 F | HEART RATE: 101 BPM | BODY MASS INDEX: 35.15 KG/M2 | DIASTOLIC BLOOD PRESSURE: 74 MMHG | WEIGHT: 218.69 LBS | OXYGEN SATURATION: 95 % | SYSTOLIC BLOOD PRESSURE: 126 MMHG

## 2017-05-15 DIAGNOSIS — Z79.4 UNCONTROLLED TYPE 2 DIABETES MELLITUS WITH HYPERGLYCEMIA, WITH LONG-TERM CURRENT USE OF INSULIN: Chronic | ICD-10-CM

## 2017-05-15 DIAGNOSIS — E11.65 UNCONTROLLED TYPE 2 DIABETES MELLITUS WITH HYPERGLYCEMIA, WITH LONG-TERM CURRENT USE OF INSULIN: Chronic | ICD-10-CM

## 2017-05-15 DIAGNOSIS — H60.502 ACUTE NONINFECTIVE OTITIS EXTERNA OF LEFT EAR, UNSPECIFIED TYPE: Primary | ICD-10-CM

## 2017-05-15 PROCEDURE — 83036 HEMOGLOBIN GLYCOSYLATED A1C: CPT

## 2017-05-15 PROCEDURE — 36415 COLL VENOUS BLD VENIPUNCTURE: CPT | Mod: PN

## 2017-05-15 PROCEDURE — 99214 OFFICE O/P EST MOD 30 MIN: CPT | Mod: S$PBB,,, | Performed by: INTERNAL MEDICINE

## 2017-05-15 PROCEDURE — 99999 PR PBB SHADOW E&M-EST. PATIENT-LVL III: CPT | Mod: PBBFAC,,, | Performed by: INTERNAL MEDICINE

## 2017-05-15 RX ORDER — IBUPROFEN 600 MG/1
600 TABLET ORAL 3 TIMES DAILY PRN
Qty: 45 TABLET | Refills: 0 | Status: SHIPPED | OUTPATIENT
Start: 2017-05-15 | End: 2017-05-27 | Stop reason: SDUPTHER

## 2017-05-15 RX ORDER — HYDROXYZINE PAMOATE 25 MG/1
CAPSULE ORAL
COMMUNITY
Start: 2017-05-06 | End: 2018-02-21

## 2017-05-15 RX ORDER — NEOMYCIN SULFATE, POLYMYXIN B SULFATE AND HYDROCORTISONE 10; 3.5; 1 MG/ML; MG/ML; [USP'U]/ML
3 SUSPENSION/ DROPS AURICULAR (OTIC) 3 TIMES DAILY
Qty: 10 ML | Refills: 0 | Status: SHIPPED | OUTPATIENT
Start: 2017-05-15 | End: 2017-08-13

## 2017-05-15 RX ORDER — HYDROCODONE BITARTRATE AND ACETAMINOPHEN 10; 325 MG/1; MG/1
1 TABLET ORAL 3 TIMES DAILY PRN
Qty: 30 TABLET | Refills: 0 | Status: CANCELLED | OUTPATIENT
Start: 2017-05-15 | End: 2017-06-14

## 2017-05-15 NOTE — PROGRESS NOTES
Subjective:       Patient ID: Audrey Natarajan is a 46 y.o. female.    Chief Complaint: Otalgia (LT)    HPI Comments: She did establish with ENT, Dr. Barriga, last month    Otalgia    There is pain in the left ear. This is a recurrent problem. The current episode started in the past 7 days. The problem occurs constantly. The problem has been unchanged. There has been no fever. The pain is at a severity of 8/10. The pain is moderate. Associated symptoms include headaches. Pertinent negatives include no ear discharge. She has tried NSAIDs for the symptoms. The treatment provided mild relief. There is no history of hearing loss or a tympanostomy tube.     Review of Systems   Constitutional: Negative for fever.   HENT: Positive for ear pain. Negative for ear discharge.    Neurological: Positive for headaches.       Objective:      Physical Exam   Constitutional: She is oriented to person, place, and time. She appears well-developed and well-nourished. No distress.   HENT:   Head: Normocephalic and atraumatic.   Right Ear: Tympanic membrane, external ear and ear canal normal.   Left Ear: Tympanic membrane and external ear normal.   Eyes: Conjunctivae are normal. No scleral icterus.   Neurological: She is alert and oriented to person, place, and time.   Skin: Skin is warm and dry.   Psychiatric: She has a normal mood and affect.   Vitals reviewed.      Assessment:       1. Acute noninfective otitis externa of left ear, unspecified type    2. Uncontrolled type 2 diabetes mellitus with hyperglycemia, with long-term current use of insulin        Plan:       Audrey was seen today for otalgia.    Diagnoses and all orders for this visit:    Acute noninfective otitis externa of left ear, unspecified type - c/o recurrent symptoms.  F/u ENT  -     ibuprofen (ADVIL,MOTRIN) 600 MG tablet; Take 1 tablet (600 mg total) by mouth 3 (three) times daily as needed for Pain.  -     neomycin-polymyxin-hydrocortisone (CORTISPORIN)  3.5-10,000-1 mg/mL-unit/mL-% otic suspension; Place 3 drops into the left ear 3 (three) times daily.    Uncontrolled type 2 diabetes mellitus with hyperglycemia, with long-term current use of insulin - F/u A1c and adjust insulin as needed.  Weight increasing again.  -     Hemoglobin A1c; Future         F/u 3 months and prn

## 2017-05-16 LAB
ESTIMATED AVG GLUCOSE: 137 MG/DL
HBA1C MFR BLD HPLC: 6.4 %

## 2017-05-27 DIAGNOSIS — H60.502 ACUTE NONINFECTIVE OTITIS EXTERNA OF LEFT EAR, UNSPECIFIED TYPE: ICD-10-CM

## 2017-05-27 RX ORDER — IBUPROFEN 600 MG/1
TABLET ORAL
Qty: 45 TABLET | Refills: 0 | Status: SHIPPED | OUTPATIENT
Start: 2017-05-27 | End: 2017-07-09

## 2017-06-06 ENCOUNTER — TELEPHONE (OUTPATIENT)
Dept: FAMILY MEDICINE | Facility: CLINIC | Age: 47
End: 2017-06-06

## 2017-06-06 RX ORDER — MELOXICAM 7.5 MG/1
TABLET ORAL
Qty: 60 TABLET | Refills: 0 | Status: SHIPPED | OUTPATIENT
Start: 2017-06-06 | End: 2017-07-08 | Stop reason: SDUPTHER

## 2017-06-06 RX ORDER — MELOXICAM 7.5 MG/1
TABLET ORAL
Qty: 60 TABLET | Refills: 0 | Status: CANCELLED | OUTPATIENT
Start: 2017-06-06

## 2017-06-19 ENCOUNTER — OFFICE VISIT (OUTPATIENT)
Dept: FAMILY MEDICINE | Facility: CLINIC | Age: 47
End: 2017-06-19
Payer: MEDICAID

## 2017-06-19 VITALS
SYSTOLIC BLOOD PRESSURE: 134 MMHG | HEART RATE: 95 BPM | BODY MASS INDEX: 33.87 KG/M2 | DIASTOLIC BLOOD PRESSURE: 82 MMHG | OXYGEN SATURATION: 98 % | HEIGHT: 66 IN | WEIGHT: 210.75 LBS | RESPIRATION RATE: 17 BRPM | TEMPERATURE: 98 F

## 2017-06-19 DIAGNOSIS — M54.32 LEFT SIDED SCIATICA: ICD-10-CM

## 2017-06-19 DIAGNOSIS — K21.9 GASTROESOPHAGEAL REFLUX DISEASE WITHOUT ESOPHAGITIS: ICD-10-CM

## 2017-06-19 DIAGNOSIS — I15.2 HYPERTENSION ASSOCIATED WITH DIABETES: ICD-10-CM

## 2017-06-19 DIAGNOSIS — E11.9 TYPE 2 DIABETES MELLITUS WITHOUT COMPLICATION, WITHOUT LONG-TERM CURRENT USE OF INSULIN: ICD-10-CM

## 2017-06-19 DIAGNOSIS — E11.59 HYPERTENSION ASSOCIATED WITH DIABETES: ICD-10-CM

## 2017-06-19 DIAGNOSIS — F31.9 BIPOLAR 1 DISORDER: Primary | Chronic | ICD-10-CM

## 2017-06-19 PROCEDURE — 3044F HG A1C LEVEL LT 7.0%: CPT | Mod: ,,, | Performed by: INTERNAL MEDICINE

## 2017-06-19 PROCEDURE — 99214 OFFICE O/P EST MOD 30 MIN: CPT | Mod: S$PBB,,, | Performed by: INTERNAL MEDICINE

## 2017-06-19 PROCEDURE — 99213 OFFICE O/P EST LOW 20 MIN: CPT | Mod: PBBFAC,PN | Performed by: INTERNAL MEDICINE

## 2017-06-19 PROCEDURE — 4010F ACE/ARB THERAPY RXD/TAKEN: CPT | Mod: ,,, | Performed by: INTERNAL MEDICINE

## 2017-06-19 PROCEDURE — 99999 PR PBB SHADOW E&M-EST. PATIENT-LVL III: CPT | Mod: PBBFAC,,, | Performed by: INTERNAL MEDICINE

## 2017-06-19 RX ORDER — LISINOPRIL 5 MG/1
5 TABLET ORAL DAILY
Qty: 30 TABLET | Refills: 5 | Status: SHIPPED | OUTPATIENT
Start: 2017-06-19 | End: 2017-12-06 | Stop reason: SDUPTHER

## 2017-06-19 RX ORDER — METHOCARBAMOL 750 MG/1
750 TABLET, FILM COATED ORAL 4 TIMES DAILY PRN
Qty: 60 TABLET | Refills: 1 | Status: SHIPPED | OUTPATIENT
Start: 2017-06-19 | End: 2017-08-18 | Stop reason: SDUPTHER

## 2017-06-19 RX ORDER — PHENOL/SODIUM PHENOLATE
40 AEROSOL, SPRAY (ML) MUCOUS MEMBRANE DAILY
Qty: 60 EACH | Refills: 5 | Status: SHIPPED | OUTPATIENT
Start: 2017-06-19 | End: 2017-12-04 | Stop reason: SDUPTHER

## 2017-06-19 RX ORDER — METFORMIN HYDROCHLORIDE 1000 MG/1
1000 TABLET ORAL 2 TIMES DAILY WITH MEALS
Qty: 60 TABLET | Refills: 5 | Status: SHIPPED | OUTPATIENT
Start: 2017-06-19 | End: 2018-01-24 | Stop reason: SDUPTHER

## 2017-06-19 RX ORDER — GABAPENTIN 600 MG/1
1200 TABLET ORAL 3 TIMES DAILY
Qty: 90 TABLET | Refills: 5 | Status: SHIPPED | OUTPATIENT
Start: 2017-06-19 | End: 2017-09-18 | Stop reason: SDUPTHER

## 2017-06-19 NOTE — PROGRESS NOTES
"Subjective:       Patient ID: Audrey Natarajan is a 46 y.o. female.    Chief Complaint: Medication Refill; Anxiety; and Gastroesophageal Reflux    Anxiety    HPI: 45 y/o w/ HTN DM s/p gastric bypass, bipolar d/o presents with one month of increasing irritability. Has had some issues in her home (roof linking, problems with sink) which has led to conflict with her boyfriend with whom she leaves. Feels she is more verbaly combative. Follows at [facility] (dr. Juan Labadie) scheduled to see him tomorrow. Using clonazepam two to three times daily with short term relief. respirodone nightly. Sleep "okay" denies excessive spending or energy. No SI/HI denies AH/VH    Regarding DM blood glucose significantly improved since bypass surgery still taking metformin      Review of Systems   Constitutional: Negative for activity change, appetite change, fatigue, fever and unexpected weight change.   HENT: Negative for ear pain, rhinorrhea and sore throat.    Eyes: Negative for discharge and visual disturbance.   Respiratory: Negative for chest tightness, shortness of breath and wheezing.    Cardiovascular: Negative for chest pain, palpitations and leg swelling.   Gastrointestinal: Negative for abdominal pain, constipation and diarrhea.   Endocrine: Negative for cold intolerance and heat intolerance.   Genitourinary: Negative for dysuria and hematuria.   Musculoskeletal: Negative for joint swelling and neck stiffness.   Skin: Negative for rash.   Neurological: Negative for dizziness, syncope, weakness and headaches.   Psychiatric/Behavioral: Positive for agitation. Negative for suicidal ideas.       Objective:     Vitals:    06/19/17 1013   BP: 134/82   BP Location: Right arm   Patient Position: Sitting   BP Method: Manual   Pulse: 95   Resp: 17   Temp: 98.1 °F (36.7 °C)   TempSrc: Oral   SpO2: 98%   Weight: 95.6 kg (210 lb 12.2 oz)   Height: 5' 6" (1.676 m)          Physical Exam   Constitutional: She is oriented to person, place, " and time. She appears well-developed and well-nourished.   HENT:   Head: Normocephalic and atraumatic.   Eyes: Conjunctivae are normal. Pupils are equal, round, and reactive to light.   Neck: Normal range of motion.   Cardiovascular: Normal rate and regular rhythm.  Exam reveals no gallop and no friction rub.    No murmur heard.  Pulmonary/Chest: Effort normal and breath sounds normal. She has no wheezes. She has no rales.   Abdominal: Soft. Bowel sounds are normal. There is no tenderness. There is no rebound and no guarding.   Musculoskeletal: Normal range of motion. She exhibits no edema or tenderness.   Neurological: She is alert and oriented to person, place, and time. No cranial nerve deficit.   Skin: Skin is warm and dry.   Numerous  Healed excoriations of face and upper arms   Psychiatric: She has a normal mood and affect.   Tangential speech but able to redirect, not responding to internal stimuli thought process is linear       Assessment:       1. Bipolar 1 disorder    2. Left sided sciatica    3. Hypertension associated with diabetes    4. Gastroesophageal reflux disease without esophagitis    5. Type 2 diabetes mellitus without complication, without long-term current use of insulin        Plan:    1. Medication adjustment per psychiatrist    2. Gabapentin tid    3. At goal continue current medications    4. ppi daily    5. Continue metformin

## 2017-07-09 RX ORDER — MELOXICAM 7.5 MG/1
TABLET ORAL
Qty: 60 TABLET | Refills: 2 | Status: SHIPPED | OUTPATIENT
Start: 2017-07-09 | End: 2017-08-02 | Stop reason: ALTCHOICE

## 2017-07-11 DIAGNOSIS — I15.2 HYPERTENSION ASSOCIATED WITH DIABETES: ICD-10-CM

## 2017-07-11 DIAGNOSIS — E11.59 HYPERTENSION ASSOCIATED WITH DIABETES: ICD-10-CM

## 2017-07-11 RX ORDER — FUROSEMIDE 20 MG/1
TABLET ORAL
Qty: 30 TABLET | Refills: 1 | Status: SHIPPED | OUTPATIENT
Start: 2017-07-11 | End: 2017-09-18 | Stop reason: SDUPTHER

## 2017-07-18 DIAGNOSIS — I10 ESSENTIAL HYPERTENSION: ICD-10-CM

## 2017-07-18 RX ORDER — METOPROLOL TARTRATE 25 MG/1
25 TABLET, FILM COATED ORAL DAILY
Qty: 30 TABLET | Refills: 2 | Status: SHIPPED | OUTPATIENT
Start: 2017-07-18 | End: 2017-08-18 | Stop reason: SDUPTHER

## 2017-07-18 NOTE — TELEPHONE ENCOUNTER
----- Message from Gisella Plasencia sent at 7/18/2017  8:38 AM CDT -----  Refill: metoprolol tartrate (LOPRESSOR) 25 MG tablet  Refill: ibuprofen (ADVIL,MOTRIN) 600 MG tablet     Please send to [facility]. Thank you!

## 2017-07-21 RX ORDER — IBUPROFEN 600 MG/1
600 TABLET ORAL 3 TIMES DAILY
Qty: 30 TABLET | Refills: 2 | Status: SHIPPED | OUTPATIENT
Start: 2017-07-21 | End: 2017-10-24 | Stop reason: ALTCHOICE

## 2017-07-21 NOTE — TELEPHONE ENCOUNTER
----- Message from Delmy Yañez sent at 7/21/2017 11:51 AM CDT -----  Contact: self [phone]  Pt wants to know why her Iburofen 600. Was not filled. Pls call pt [phone]. Thanks.....Niyah

## 2017-08-01 ENCOUNTER — TELEPHONE (OUTPATIENT)
Dept: FAMILY MEDICINE | Facility: CLINIC | Age: 47
End: 2017-08-01

## 2017-08-01 NOTE — TELEPHONE ENCOUNTER
----- Message from Alena Plasencia sent at 8/1/2017  8:20 AM CDT -----  Contact: Self   Patient says she has severe pain in her feet. Please call at [phone].

## 2017-08-02 ENCOUNTER — OFFICE VISIT (OUTPATIENT)
Dept: FAMILY MEDICINE | Facility: CLINIC | Age: 47
End: 2017-08-02
Payer: MEDICAID

## 2017-08-02 VITALS
HEIGHT: 66 IN | BODY MASS INDEX: 35.52 KG/M2 | RESPIRATION RATE: 17 BRPM | DIASTOLIC BLOOD PRESSURE: 62 MMHG | TEMPERATURE: 99 F | HEART RATE: 107 BPM | SYSTOLIC BLOOD PRESSURE: 128 MMHG | OXYGEN SATURATION: 96 % | WEIGHT: 221 LBS

## 2017-08-02 DIAGNOSIS — M79.671 BILATERAL FOOT PAIN: Primary | ICD-10-CM

## 2017-08-02 DIAGNOSIS — M79.672 BILATERAL FOOT PAIN: Primary | ICD-10-CM

## 2017-08-02 PROCEDURE — 99214 OFFICE O/P EST MOD 30 MIN: CPT | Mod: S$PBB,,, | Performed by: INTERNAL MEDICINE

## 2017-08-02 PROCEDURE — 99213 OFFICE O/P EST LOW 20 MIN: CPT | Mod: PBBFAC,PN | Performed by: INTERNAL MEDICINE

## 2017-08-02 PROCEDURE — 3078F DIAST BP <80 MM HG: CPT | Mod: ,,, | Performed by: INTERNAL MEDICINE

## 2017-08-02 PROCEDURE — 3074F SYST BP LT 130 MM HG: CPT | Mod: ,,, | Performed by: INTERNAL MEDICINE

## 2017-08-02 PROCEDURE — 3008F BODY MASS INDEX DOCD: CPT | Mod: ,,, | Performed by: INTERNAL MEDICINE

## 2017-08-02 PROCEDURE — 99999 PR PBB SHADOW E&M-EST. PATIENT-LVL III: CPT | Mod: PBBFAC,,, | Performed by: INTERNAL MEDICINE

## 2017-08-02 RX ORDER — METHYLPREDNISOLONE 4 MG/1
TABLET ORAL
Qty: 21 TABLET | Refills: 0 | Status: SHIPPED | OUTPATIENT
Start: 2017-08-02 | End: 2017-08-18 | Stop reason: ALTCHOICE

## 2017-08-02 RX ORDER — HYDROCODONE BITARTRATE AND ACETAMINOPHEN 7.5; 325 MG/1; MG/1
1 TABLET ORAL EVERY 8 HOURS PRN
Qty: 30 TABLET | Refills: 0 | Status: SHIPPED | OUTPATIENT
Start: 2017-08-02 | End: 2017-09-06 | Stop reason: SDUPTHER

## 2017-08-03 ENCOUNTER — TELEPHONE (OUTPATIENT)
Dept: FAMILY MEDICINE | Facility: CLINIC | Age: 47
End: 2017-08-03

## 2017-08-03 NOTE — TELEPHONE ENCOUNTER
----- Message from Keyona Howard sent at 8/1/2017 11:12 AM CDT -----  Contact: self  Pt calling to state that she is having severe foot pain. Pt was not able to be scheduled until 8/10. Please call [phone]

## 2017-08-09 ENCOUNTER — OFFICE VISIT (OUTPATIENT)
Dept: URGENT CARE | Facility: CLINIC | Age: 47
End: 2017-08-09
Payer: MEDICAID

## 2017-08-09 VITALS
DIASTOLIC BLOOD PRESSURE: 54 MMHG | SYSTOLIC BLOOD PRESSURE: 105 MMHG | RESPIRATION RATE: 18 BRPM | WEIGHT: 209 LBS | HEIGHT: 66 IN | OXYGEN SATURATION: 98 % | TEMPERATURE: 99 F | HEART RATE: 107 BPM | BODY MASS INDEX: 33.59 KG/M2

## 2017-08-09 DIAGNOSIS — S83.92XA SPRAIN OF LEFT KNEE, UNSPECIFIED LIGAMENT, INITIAL ENCOUNTER: ICD-10-CM

## 2017-08-09 DIAGNOSIS — S80.12XA CONTUSION, KNEE AND LOWER LEG, LEFT, INITIAL ENCOUNTER: Primary | ICD-10-CM

## 2017-08-09 DIAGNOSIS — G89.29 OTHER CHRONIC PAIN: ICD-10-CM

## 2017-08-09 DIAGNOSIS — S80.02XA CONTUSION, KNEE AND LOWER LEG, LEFT, INITIAL ENCOUNTER: Primary | ICD-10-CM

## 2017-08-09 PROCEDURE — 99213 OFFICE O/P EST LOW 20 MIN: CPT | Mod: S$GLB,,, | Performed by: FAMILY MEDICINE

## 2017-08-09 PROCEDURE — 3074F SYST BP LT 130 MM HG: CPT | Mod: S$GLB,,, | Performed by: FAMILY MEDICINE

## 2017-08-09 PROCEDURE — 3008F BODY MASS INDEX DOCD: CPT | Mod: S$GLB,,, | Performed by: FAMILY MEDICINE

## 2017-08-09 PROCEDURE — 3078F DIAST BP <80 MM HG: CPT | Mod: S$GLB,,, | Performed by: FAMILY MEDICINE

## 2017-08-09 RX ORDER — MELOXICAM 15 MG/1
15 TABLET ORAL DAILY
Qty: 30 TABLET | Refills: 0 | Status: SHIPPED | OUTPATIENT
Start: 2017-08-09 | End: 2017-09-08

## 2017-08-09 RX ORDER — MELOXICAM 7.5 MG/1
TABLET ORAL
COMMUNITY
Start: 2017-08-05 | End: 2017-08-09

## 2017-08-09 NOTE — PROGRESS NOTES
"Subjective:       Patient ID: Audrey Natarajan is a 47 y.o. female.    Vitals:  height is 5' 6" (1.676 m) and weight is 94.8 kg (209 lb). Her temperature is 98.8 °F (37.1 °C). Her blood pressure is 105/54 (abnormal) and her pulse is 107. Her respiration is 18 and oxygen saturation is 98%.     Chief Complaint: Trauma    Trauma   The incident occurred 12 to 24 hours ago. The injury mechanism was a fall. There is an injury to the left ankle, left lower leg and left knee (Pt c/o having Lt leg/knee/ankle injury s/p fall last night.). The pain is moderate. It is unknown if a foreign body is present. Pertinent negatives include no abdominal pain, chest pain, neck pain, numbness or weakness. There have been no prior injuries to these areas.     Review of Systems   Constitution: Negative for weakness and malaise/fatigue.   HENT: Negative for nosebleeds.    Cardiovascular: Negative for chest pain and syncope.   Respiratory: Negative for shortness of breath.    Musculoskeletal: Positive for joint pain and joint swelling. Negative for back pain and neck pain.   Gastrointestinal: Negative for abdominal pain.   Genitourinary: Negative for hematuria.   Neurological: Negative for dizziness and numbness.       Objective:      Physical Exam   Constitutional: She is oriented to person, place, and time. She appears well-developed.   HENT:   Head: Normocephalic.   Eyes: Conjunctivae and EOM are normal. Pupils are equal, round, and reactive to light.   Cardiovascular: Normal rate and regular rhythm.    Musculoskeletal:        Left knee: She exhibits swelling, ecchymosis and laceration. She exhibits normal range of motion, no deformity and no erythema. Tenderness found.        Left lower leg: She exhibits tenderness, swelling and edema. She exhibits no deformity and no laceration.        Legs:  Neurological: She is alert and oriented to person, place, and time.       Assessment:       1. Contusion, knee and lower leg, left, initial " encounter    2. Sprain of left knee, unspecified ligament, initial encounter    3. Other chronic pain        Plan:         Contusion, knee and lower leg, left, initial encounter  -     meloxicam (MOBIC) 15 MG tablet; Take 1 tablet (15 mg total) by mouth once daily.  Dispense: 30 tablet; Refill: 0    Sprain of left knee, unspecified ligament, initial encounter  -     meloxicam (MOBIC) 15 MG tablet; Take 1 tablet (15 mg total) by mouth once daily.  Dispense: 30 tablet; Refill: 0    Other chronic pain  -     Ambulatory referral to Pain Clinic

## 2017-08-10 ENCOUNTER — HOSPITAL ENCOUNTER (EMERGENCY)
Facility: HOSPITAL | Age: 47
Discharge: HOME | End: 2017-08-10
Attending: EMERGENCY MEDICINE
Payer: MEDICAID

## 2017-08-10 VITALS
OXYGEN SATURATION: 96 % | RESPIRATION RATE: 18 BRPM | TEMPERATURE: 98 F | SYSTOLIC BLOOD PRESSURE: 122 MMHG | HEART RATE: 92 BPM | BODY MASS INDEX: 35.84 KG/M2 | WEIGHT: 223 LBS | DIASTOLIC BLOOD PRESSURE: 62 MMHG | HEIGHT: 66 IN

## 2017-08-10 DIAGNOSIS — M79.89 PAIN AND SWELLING OF LOWER LEG, LEFT: ICD-10-CM

## 2017-08-10 DIAGNOSIS — S80.12XA CONTUSION OF LEFT LEG, INITIAL ENCOUNTER: ICD-10-CM

## 2017-08-10 DIAGNOSIS — S80.12XA LEG HEMATOMA, LEFT, INITIAL ENCOUNTER: Primary | ICD-10-CM

## 2017-08-10 DIAGNOSIS — M79.662 PAIN AND SWELLING OF LOWER LEG, LEFT: ICD-10-CM

## 2017-08-10 LAB — POCT GLUCOSE: 104 MG/DL (ref 70–110)

## 2017-08-10 PROCEDURE — 96372 THER/PROPH/DIAG INJ SC/IM: CPT

## 2017-08-10 PROCEDURE — 63600175 PHARM REV CODE 636 W HCPCS: Performed by: NURSE PRACTITIONER

## 2017-08-10 PROCEDURE — 99284 EMERGENCY DEPT VISIT MOD MDM: CPT | Mod: 25

## 2017-08-10 PROCEDURE — 82962 GLUCOSE BLOOD TEST: CPT

## 2017-08-10 RX ORDER — KETOROLAC TROMETHAMINE 30 MG/ML
15 INJECTION, SOLUTION INTRAMUSCULAR; INTRAVENOUS
Status: COMPLETED | OUTPATIENT
Start: 2017-08-10 | End: 2017-08-10

## 2017-08-10 RX ORDER — HYDROMORPHONE HYDROCHLORIDE 2 MG/ML
1 INJECTION, SOLUTION INTRAMUSCULAR; INTRAVENOUS; SUBCUTANEOUS
Status: DISCONTINUED | OUTPATIENT
Start: 2017-08-10 | End: 2017-08-10

## 2017-08-10 RX ADMIN — KETOROLAC TROMETHAMINE 15 MG: 30 INJECTION, SOLUTION INTRAMUSCULAR at 12:08

## 2017-08-10 NOTE — ED TRIAGE NOTES
Pt reports haivng a fall on Tuesday night.  States that she fell and landed on the LLE on concrete.  Pt states that lle with pain right beneath the knee

## 2017-08-10 NOTE — DISCHARGE INSTRUCTIONS
Follow-up with your primary care physician as soon as possible for further evaluation and management.    Keep the affected leg elevated above the level of the heart and use ice packs and Ace wrap to reduce pain and swelling.    Continue taking the pain medications that were prescribed to you previously.    Return to the emergency department for any new or worsening symptoms.

## 2017-08-10 NOTE — ED PROVIDER NOTES
Encounter Date: 8/10/2017    SCRIBE #1 NOTE: I, Javier Prakash, am scribing for, and in the presence of,  Jimy Vázquez NP. I have scribed the following portions of the note - Other sections scribed: HPI/ROS.       History     Chief Complaint   Patient presents with    Leg Swelling     States she has left leg swelling      CC: Leg Swelling    HPI: This 47 y.o. Female with a medical history of asthma, bipolar 1 disorder, COPD, CAD, depression, diabetes mellitus, bilateral DVT of lower extremity, and blood clots presents to the ED c/o acute, severe (10/10) LLE (from knee to foot) swelling and pain secondary to a fall 2 days ago. There is more pain around the knee area. Patient reports being seen at Urgent Care yesterday for similar symptoms where she was prescribed Meloxicam. She did not have an X-ray completed. No alleviating or exacerbating factors. Patient denies chest pain, back pain, abdominal pain, N/V/D, fever, chills, SOB, and cough. Otherwise, there are no other associated symptoms. Her A1C is 6.2.      The history is provided by the patient. No  was used.     Review of patient's allergies indicates:   Allergen Reactions    Morphine Other (See Comments)     Patient had a psychotic episode after taking Morphine  Agitation, hallucinations    Penicillins Anaphylaxis     itching     Past Medical History:   Diagnosis Date    Arthritis     Asthma     Bipolar 1 disorder     COPD (chronic obstructive pulmonary disease)     Coronary artery disease     A fib    Depression     bipolar manic depresson    Diabetes mellitus     DVT of lower extremity, bilateral July 2013    bilateral LE DVT. Estelita filter placed.     Encounter for blood transfusion     History of blood clots 1. Left Leg=2003; 2.Bilateral Groin=Blood Clots= 5 or 6/ 2013 & 7/2013; 3. LLL of Lung=7/2013;  4. Lt. Lower Leg=7/2013.     Pt. had 1st Blood Clot after [id number], & Last=2013. Dillon Filter=  "Rt.Lateral Neck.    HTN (hypertension) 2013    Pt states that she does not have hypertension    Hypercholesteremia     Irregular heartbeat     Neuromuscular disorder     neuropathy feet    PE (pulmonary embolism) 2013     bilat LE DVT.     Restless leg syndrome      Past Surgical History:   Procedure Laterality Date    ABDOMINAL SURGERY      BILATERAL OOPHORECTOMY Bilateral 2015    Green' s filter Right 2012    Right Neck & Tunneled Down.    HERNIA REPAIR      "[facility] Hernias Repaires around th [place].", pt. states    OVARIAN CYST REMOVAL  3/13/2014    MI REMOVAL OF OVARY/TUBE(S)      SPLENECTOMY, TOTAL  2003    TONSILLECTOMY      as a child    TYMPANOSTOMY TUBE PLACEMENT      VEIN SURGERY      Lt leg     Family History   Problem Relation Age of Onset    Hypertension Father     Diabetes Father     Heart disease Father     Cataracts Father     Diabetes Paternal Grandfather     Heart disease Paternal Grandfather     No Known Problems Mother     Ovarian cancer Maternal Grandmother       from this. ? age     No Known Problems Sister     No Known Problems Brother     No Known Problems Maternal Aunt     No Known Problems Maternal Uncle     No Known Problems Paternal Aunt     No Known Problems Paternal Uncle     No Known Problems Maternal Grandfather     No Known Problems Paternal Grandmother     Uterine cancer Neg Hx     Breast cancer Neg Hx     Colon cancer Neg Hx     Amblyopia Neg Hx     Blindness Neg Hx     Cancer Neg Hx     Glaucoma Neg Hx     Macular degeneration Neg Hx     Retinal detachment Neg Hx     Strabismus Neg Hx     Stroke Neg Hx     Thyroid disease Neg Hx      Social History   Substance Use Topics    Smoking status: Current Every Day Smoker     Packs/day: 1.50     Years: 25.00     Types: Cigarettes     Last attempt to quit: 2016    Smokeless tobacco: Never Used    Alcohol use No     Review of Systems "   Constitutional: Negative for chills and fever.   HENT: Negative for congestion, ear pain, rhinorrhea and sore throat.    Eyes: Negative for pain and visual disturbance.   Respiratory: Negative for cough and shortness of breath.    Cardiovascular: Positive for leg swelling. Negative for chest pain.   Gastrointestinal: Negative for abdominal pain, diarrhea, nausea and vomiting.   Genitourinary: Negative for dysuria.   Musculoskeletal: Negative for back pain and neck pain.   Skin: Negative for rash.   Neurological: Negative for headaches.       Physical Exam     Initial Vitals [08/10/17 1014]   BP Pulse Resp Temp SpO2   (!) 108/56 101 18 98.1 °F (36.7 °C) 96 %      MAP       73.33         Physical Exam    Nursing note and vitals reviewed.  Constitutional: She appears well-developed and well-nourished. She is not diaphoretic. No distress.   HENT:   Head: Normocephalic and atraumatic.   Right Ear: External ear normal.   Left Ear: External ear normal.   Nose: Nose normal.   Eyes: Conjunctivae and EOM are normal. Pupils are equal, round, and reactive to light. Right eye exhibits no discharge. Left eye exhibits no discharge. No scleral icterus.   Neck: Normal range of motion. Neck supple. No thyromegaly present. No tracheal deviation present. No JVD present.   Cardiovascular: Normal rate, regular rhythm, normal heart sounds and intact distal pulses. Exam reveals no gallop and no friction rub.    No murmur heard.  Pulses:       Dorsalis pedis pulses are 2+ on the right side, and 2+ on the left side.   Left foot and left leg pitting edema   Pulmonary/Chest: Breath sounds normal. No stridor. No respiratory distress. She has no wheezes. She has no rhonchi. She has no rales.   Abdominal: Soft. Bowel sounds are normal. She exhibits no distension and no mass. There is no tenderness. There is no rebound and no guarding.   Musculoskeletal: Normal range of motion. She exhibits no edema.        Left lower leg: She exhibits  tenderness and swelling.        Left foot: There is swelling.   Anterior left leg ecchymosis and tenderness to palpation distal to the knee   Lymphadenopathy:     She has no cervical adenopathy.   Neurological: She is alert and oriented to person, place, and time. She has normal strength and normal reflexes. She displays normal reflexes. No cranial nerve deficit or sensory deficit.   Skin: Skin is warm and dry. No rash and no abscess noted. No erythema. No pallor.   Psychiatric: She has a normal mood and affect. Her behavior is normal. Judgment and thought content normal.         ED Course   Procedures  Labs Reviewed   POCT GLUCOSE             Medical Decision Making:   Differential Diagnosis:   DVT, compartment syndrome, cellulitis, fracture  ED Management:  This is a 47-year-old female presenting to the emergency department for evaluation of left leg swelling and pain that began after a mechanical fall several days ago. Patient was seen at urgent care yesterday after falling and diagnosed with a knee sprain and contusion. Today she states that the swelling has worsened. She denies fevers, nausea, vomiting, shortness of breath, diarrhea, or other associated symptoms. She is a chronic pain patient and has attempted treatment with Mobic and hydrocodone prior to arrival. She is afebrile, well-appearing, in no distress. There is ecchymosis and a hematoma to the left anterior leg just distal to the knee. The the ankle, dorsal aspect of the foot, and anterior leg are edematous and pitting. She reports that her legs often so and is on Lasix. The leg and foot are compressible on palpation. Pedal pulses 2+. There is no erythema or warmth. There is no calf tenderness. Ultrasound of the left lower extremity is negative for any evidence of DVT. I believe the swelling is related to musculoskeletal injury. Instructed patient to follow-up with her PCP. Instructed her to use pain medications that she already has at home as needed.  ED return precautions given. Patient expressed understanding of diagnosis and discharge instructions. She is safe and stable for discharge and outpatient follow-up.  Other:   I have discussed this case with another health care provider.       <> Summary of the Discussion: Case discussed my attending physician Shaggy Miner M.D. who also evaluated the patient and agreed with the assessment and plan.            Scribe Attestation:   Scribe #1: I performed the above scribed service and the documentation accurately describes the services I performed. I attest to the accuracy of the note.    Attending Attestation:           Physician Attestation for Scribe:  Physician Attestation Statement for Scribe #1: I, Jimy Vázquez NP, reviewed documentation, as scribed by Javier Prakash in my presence, and it is both accurate and complete.                 ED Course     Clinical Impression:   The primary encounter diagnosis was Leg hematoma, left, initial encounter. Diagnoses of Pain and swelling of lower leg, left and Contusion of left leg, initial encounter were also pertinent to this visit.    Disposition:   Disposition: Discharged                        Jimy Vázquez NP  08/10/17 2291

## 2017-08-13 ENCOUNTER — HOSPITAL ENCOUNTER (EMERGENCY)
Facility: HOSPITAL | Age: 47
Discharge: HOME | End: 2017-08-13
Attending: EMERGENCY MEDICINE
Payer: MEDICAID

## 2017-08-13 VITALS
WEIGHT: 223 LBS | RESPIRATION RATE: 18 BRPM | SYSTOLIC BLOOD PRESSURE: 132 MMHG | HEIGHT: 66 IN | DIASTOLIC BLOOD PRESSURE: 94 MMHG | HEART RATE: 94 BPM | OXYGEN SATURATION: 98 % | BODY MASS INDEX: 35.84 KG/M2 | TEMPERATURE: 98 F

## 2017-08-13 DIAGNOSIS — L03.116 CELLULITIS OF LEFT LOWER EXTREMITY: Primary | ICD-10-CM

## 2017-08-13 PROCEDURE — 25000003 PHARM REV CODE 250: Performed by: PHYSICIAN ASSISTANT

## 2017-08-13 PROCEDURE — 96372 THER/PROPH/DIAG INJ SC/IM: CPT

## 2017-08-13 PROCEDURE — 63600175 PHARM REV CODE 636 W HCPCS: Performed by: PHYSICIAN ASSISTANT

## 2017-08-13 PROCEDURE — 99283 EMERGENCY DEPT VISIT LOW MDM: CPT | Mod: 25

## 2017-08-13 RX ORDER — KETOROLAC TROMETHAMINE 30 MG/ML
10 INJECTION, SOLUTION INTRAMUSCULAR; INTRAVENOUS
Status: COMPLETED | OUTPATIENT
Start: 2017-08-13 | End: 2017-08-13

## 2017-08-13 RX ORDER — DOXYCYCLINE HYCLATE 100 MG
100 TABLET ORAL
Status: COMPLETED | OUTPATIENT
Start: 2017-08-13 | End: 2017-08-13

## 2017-08-13 RX ORDER — KETOROLAC TROMETHAMINE 10 MG/1
10 TABLET, FILM COATED ORAL 3 TIMES DAILY PRN
Qty: 30 TABLET | Refills: 0 | Status: SHIPPED | OUTPATIENT
Start: 2017-08-13 | End: 2017-10-04

## 2017-08-13 RX ORDER — DOXYCYCLINE 100 MG/1
100 CAPSULE ORAL 2 TIMES DAILY
Qty: 20 CAPSULE | Refills: 0 | Status: SHIPPED | OUTPATIENT
Start: 2017-08-13 | End: 2017-08-23

## 2017-08-13 RX ADMIN — DOXYCYCLINE HYCLATE 100 MG: 100 TABLET, COATED ORAL at 10:08

## 2017-08-13 RX ADMIN — KETOROLAC TROMETHAMINE 10 MG: 30 INJECTION, SOLUTION INTRAMUSCULAR at 10:08

## 2017-08-14 NOTE — ED TRIAGE NOTES
"Patient states she fell last week and injured her left knee when she tripped over a stone. Patient has a h/o neuropathy in both feet. Patient was seen at Urgent care and diagnosed "almost fracture to knee, ankle and foot without doing xray." Patient had been taking her robaxin and her prescribed mobic. Patient is out of hydrocodone/acetaminophen 7.5-325. Patient was seen here on 8/10 and was given "a shot where I can drive home with."    "

## 2017-08-14 NOTE — DISCHARGE INSTRUCTIONS
Take antibiotics as prescribed.  Follow-up with primary care physician as scheduled this week for reevaluation of symptoms.  Return to this ED if any problems occur.

## 2017-08-14 NOTE — ED PROVIDER NOTES
"Encounter Date: 8/13/2017    SCRIBE #1 NOTE: I, Graham Salasy II, am scribing for, and in the presence of,  Issac Rosas PA-C. I have scribed the following portions of the note - Other sections scribed: HPI and ROS.       History     Chief Complaint   Patient presents with    Leg Pain     pt reports falling last week but still having left leg pain      CC: Leg pain     HPI: This 47 y.o. female with COPD, asthma, NIDDM, Hypercholesteremia, irregular heartbeat, bilateral DVT of lower extremity, PE, CAD, Bipolar 1 disorder, encounter for blood transfusion, arthritis, neuromuscular disorder, restless leg syndrome, depression, HTN, blood clots presents to the ED c/o acute onset, severe (8/10), left leg swelling x5 days. Pt states she tripped and fell walking up the stairs to her home. Pt reports a Hx of cellulitis and says the pain feels the same. Pt reports an incident where she "fell and busted a vein" in the same left leg and had to have surgery. She states her left leg has never been the same since. No alleviating factors. Pain is exacerbated with palpation and exertion. Pt states she is unable to walk properly. Pt denies weakness, numbness, and back pain.         The history is provided by the patient. No  was used.     Review of patient's allergies indicates:   Allergen Reactions    Morphine Other (See Comments)     Patient had a psychotic episode after taking Morphine  Agitation, hallucinations    Penicillins Anaphylaxis     itching     Past Medical History:   Diagnosis Date    Arthritis     Asthma     Bipolar 1 disorder     COPD (chronic obstructive pulmonary disease)     Coronary artery disease     A fib    Depression     bipolar manic depresson    Diabetes mellitus     DVT of lower extremity, bilateral July 2013    bilateral LE DVT. Nelson filter placed.     Encounter for blood transfusion     History of blood clots 1. Left Leg=2003; 2.Bilateral Groin=Blood Clots= 5 or 6/ " " & 2013; 3. LLL of Lung=2013;  4. Lt. Lower Leg=2013.     Pt. had 1st Blood Clot after [id number], & Last=. [place] Filter= Rt.Lateral Neck.    HTN (hypertension) 2013    Pt states that she does not have hypertension    Hypercholesteremia     Irregular heartbeat     Neuromuscular disorder     neuropathy feet    PE (pulmonary embolism) 2013     bilat LE DVT.     Restless leg syndrome      Past Surgical History:   Procedure Laterality Date    ABDOMINAL SURGERY      BILATERAL OOPHORECTOMY Bilateral 2015    Green' s filter Right 2012    Right Neck & Tunneled Down.    HERNIA REPAIR      "[place] of Hernias Repaires around th Belly Button.", pt. states    OVARIAN CYST REMOVAL  3/13/2014    NM REMOVAL OF OVARY/TUBE(S)      SPLENECTOMY, TOTAL  2003    TONSILLECTOMY      as a child    TYMPANOSTOMY TUBE PLACEMENT      VEIN SURGERY      Lt leg     Family History   Problem Relation Age of Onset    Hypertension Father     Diabetes Father     Heart disease Father     Cataracts Father     Diabetes Paternal Grandfather     Heart disease Paternal Grandfather     No Known Problems Mother     Ovarian cancer Maternal Grandmother       from this. ? age     No Known Problems Sister     No Known Problems Brother     No Known Problems Maternal Aunt     No Known Problems Maternal Uncle     No Known Problems Paternal Aunt     No Known Problems Paternal Uncle     No Known Problems Maternal Grandfather     No Known Problems Paternal Grandmother     Uterine cancer Neg Hx     Breast cancer Neg Hx     Colon cancer Neg Hx     Amblyopia Neg Hx     Blindness Neg Hx     Cancer Neg Hx     Glaucoma Neg Hx     Macular degeneration Neg Hx     Retinal detachment Neg Hx     Strabismus Neg Hx     Stroke Neg Hx     Thyroid disease Neg Hx      Social History   Substance Use Topics    Smoking status: Current Every Day Smoker     Packs/day: 1.50     Years: " 25.00     Types: Cigarettes     Last attempt to quit: 5/25/2016    Smokeless tobacco: Never Used    Alcohol use No     Review of Systems   Constitutional: Negative for chills, diaphoresis and fever.   HENT: Negative for ear pain and sore throat.    Eyes:        (-) eye problems   Respiratory: Negative for cough and shortness of breath.    Cardiovascular: Negative for chest pain.   Gastrointestinal: Negative for abdominal pain, diarrhea, nausea and vomiting.   Genitourinary: Negative for dysuria.   Musculoskeletal: Negative for back pain and joint swelling.        (+) left leg pain; left leg swelling   Skin: Negative for rash.   Neurological: Negative for headaches.       Physical Exam     Initial Vitals [08/13/17 2152]   BP Pulse Resp Temp SpO2   (!) 132/94 94 18 98 °F (36.7 °C) 98 %      MAP       106.67         Physical Exam    Nursing note and vitals reviewed.  Constitutional: She appears well-developed and well-nourished. She is not diaphoretic. No distress.   HENT:   Head: Normocephalic and atraumatic.   Eyes: Conjunctivae and EOM are normal. Pupils are equal, round, and reactive to light.   Neck: Normal range of motion. Neck supple. No tracheal deviation present.   Cardiovascular: Normal heart sounds.   Pulmonary/Chest: Breath sounds normal. No stridor. No respiratory distress. She has no wheezes.   Abdominal: Soft. Bowel sounds are normal. She exhibits no distension. There is no tenderness.   Musculoskeletal: Normal range of motion.        Legs:  Lymphadenopathy:     She has no cervical adenopathy.   Neurological: She is alert and oriented to person, place, and time.   Skin: Skin is warm and dry. Capillary refill takes less than 2 seconds.   Psychiatric: She has a normal mood and affect. Her behavior is normal. Judgment and thought content normal.         ED Course   Procedures  Labs Reviewed - No data to display          Medical Decision Making:   Initial Assessment:   47-year-old female chief complaint  leg pain ×5 days.  Differential Diagnosis:   Cellulitis, abscess, strain, contusion, fracture  ED Management:  Patient overall well-appearing, in no acute distress, afebrile, vitals within normal limits.    Patient presents to ED with chief complaint leg swelling and pain.  She denies fever or chills.  She does admit to difficulty with ambulation.  She was recently seen in this ED approximately 3 days ago for similar complaint.  She states pain is persisted.  She denies shortness of breath or chest pain.  She does have IVC filter placed secondary to history of DVT and PE.  She is not tachypneic, she denies shortness of breath.  Lower extremity ultrasound performed 3 days ago without evidence of DVT.  I do not suspect DVT or PE at this time.  There is evidence of erythema and warmth to pretibial area with associated trace edema.  I do feel this may be cellulitis.  One plus DP pulse distally.  I do not suspect compartment syndrome at this time.  Patient does have appointment with her primary care physician this week.  I've asked her to follow with her physician for reevaluation of leg, and to take antibiotics as prescribed.  Patient does understand and agree with treatment plan.  Return precautions given.  Other:   I have discussed this case with another health care provider.       <> Summary of the Discussion: I have discussed this case with Dr. Holley.            Scribe Attestation:   Scribe #1: I performed the above scribed service and the documentation accurately describes the services I performed. I attest to the accuracy of the note.    Attending Attestation:     Physician Attestation Statement for NP/PA:   I discussed this assessment and plan of this patient with the NP/PA, but I did not personally examine the patient. The face to face encounter was performed by the NP/PA.    Other NP/PA Attestation Additions:      Medical Decision Making: This is the emergent evaluation of a 47-year-old female presents the  emergency department with leg pain after injury.I agree with the treatment plan and evaluation as outlined by the midlevel provider.  This is likely contusion.  No evidence of compartment syndrome.  No evidence to suggest DVT.  However, question of superimposed infection.  Antibiotics prescribed.  Advised PCP follow-up as soon as possible and return for new or worsening symptoms.       Physician Attestation for Scribe:  Physician Attestation Statement for Scribe #1: I, Issac Rosas PA-C, reviewed documentation, as scribed by Graham Levine II in my presence, and it is both accurate and complete.                 ED Course     Clinical Impression:   The encounter diagnosis was Cellulitis of left lower extremity.    Disposition:   Disposition: Discharged  Condition: Stable                        Issac Rosas PA-C  08/14/17 0017

## 2017-08-15 ENCOUNTER — TELEPHONE (OUTPATIENT)
Dept: FAMILY MEDICINE | Facility: CLINIC | Age: 47
End: 2017-08-15

## 2017-08-15 NOTE — PROGRESS NOTES
Subjective:       Patient ID: Audrey Natarajan is a 47 y.o. female.    Chief Complaint: Foot Pain (both)    C/o a 9/10 bruised feeling in both feet.  BS have been controlled.      Foot Pain   This is a new problem. The current episode started 1 to 4 weeks ago. The problem occurs constantly. The problem has been gradually worsening. Associated symptoms include arthralgias and joint swelling. The symptoms are aggravated by walking. She has tried NSAIDs for the symptoms. The treatment provided mild relief.     Review of Systems   Cardiovascular: Positive for leg swelling.   Musculoskeletal: Positive for arthralgias and joint swelling.       Objective:      Physical Exam   Constitutional: She is oriented to person, place, and time. She appears well-developed and well-nourished. No distress.   HENT:   Head: Normocephalic and atraumatic.   Eyes: Conjunctivae are normal. No scleral icterus.   Musculoskeletal:        Right foot: There is tenderness. There is normal range of motion and no swelling.        Left foot: There is tenderness. There is no bony tenderness and no swelling.   Neurological: She is alert and oriented to person, place, and time.   Skin: Skin is warm and dry.   Psychiatric: She has a normal mood and affect.   Vitals reviewed.      Assessment:       1. Bilateral foot pain        Plan:       Audrey was seen today for foot pain.    Diagnoses and all orders for this visit:    Bilateral foot pain - possible plantar fascitis   -     methylPREDNISolone (MEDROL, TAMEKA,) 4 mg tablet; use as directed  -     hydrocodone-acetaminophen 7.5-325mg (NORCO) 7.5-325 mg per tablet; Take 1 tablet by mouth every 8 (eight) hours as needed for Pain.       f/u if no improvement

## 2017-08-18 ENCOUNTER — OFFICE VISIT (OUTPATIENT)
Dept: FAMILY MEDICINE | Facility: CLINIC | Age: 47
End: 2017-08-18
Payer: MEDICAID

## 2017-08-18 VITALS
SYSTOLIC BLOOD PRESSURE: 124 MMHG | DIASTOLIC BLOOD PRESSURE: 72 MMHG | BODY MASS INDEX: 36.74 KG/M2 | HEART RATE: 89 BPM | TEMPERATURE: 98 F | OXYGEN SATURATION: 96 % | HEIGHT: 66 IN | RESPIRATION RATE: 17 BRPM | WEIGHT: 228.63 LBS

## 2017-08-18 DIAGNOSIS — I10 ESSENTIAL HYPERTENSION: ICD-10-CM

## 2017-08-18 DIAGNOSIS — Z72.0 TOBACCO ABUSE: Chronic | ICD-10-CM

## 2017-08-18 DIAGNOSIS — M54.32 LEFT SIDED SCIATICA: ICD-10-CM

## 2017-08-18 DIAGNOSIS — S89.92XD LEG INJURY, LEFT, SUBSEQUENT ENCOUNTER: Primary | ICD-10-CM

## 2017-08-18 DIAGNOSIS — M79.89 LEG SWELLING: ICD-10-CM

## 2017-08-18 PROCEDURE — 3008F BODY MASS INDEX DOCD: CPT | Mod: ,,, | Performed by: INTERNAL MEDICINE

## 2017-08-18 PROCEDURE — 3078F DIAST BP <80 MM HG: CPT | Mod: ,,, | Performed by: INTERNAL MEDICINE

## 2017-08-18 PROCEDURE — 99999 PR PBB SHADOW E&M-EST. PATIENT-LVL III: CPT | Mod: PBBFAC,,, | Performed by: INTERNAL MEDICINE

## 2017-08-18 PROCEDURE — 99214 OFFICE O/P EST MOD 30 MIN: CPT | Mod: S$PBB,,, | Performed by: INTERNAL MEDICINE

## 2017-08-18 PROCEDURE — 3074F SYST BP LT 130 MM HG: CPT | Mod: ,,, | Performed by: INTERNAL MEDICINE

## 2017-08-18 PROCEDURE — 99213 OFFICE O/P EST LOW 20 MIN: CPT | Mod: PBBFAC,PN | Performed by: INTERNAL MEDICINE

## 2017-08-18 RX ORDER — METHOCARBAMOL 750 MG/1
750 TABLET, FILM COATED ORAL 4 TIMES DAILY PRN
Qty: 60 TABLET | Refills: 1 | Status: SHIPPED | OUTPATIENT
Start: 2017-08-18 | End: 2017-12-02

## 2017-08-18 RX ORDER — METOPROLOL TARTRATE 25 MG/1
25 TABLET, FILM COATED ORAL DAILY
Qty: 30 TABLET | Refills: 2 | Status: SHIPPED | OUTPATIENT
Start: 2017-08-18 | End: 2018-01-10 | Stop reason: SDUPTHER

## 2017-08-18 RX ORDER — IBUPROFEN 200 MG
1 TABLET ORAL DAILY
Qty: 28 PATCH | Refills: 1 | Status: SHIPPED | OUTPATIENT
Start: 2017-08-18 | End: 2018-01-15

## 2017-08-18 NOTE — PROGRESS NOTES
Subjective:       Patient ID: Audrey Natarajan is a 47 y.o. female.    Chief Complaint: Fall (patient fell 08/08/17 at home in her backyard ); Leg Pain (left ); Nicotine Dependence (patient is ready to quit); Joint Swelling; and Foot Problem (discuss Referral )    She presents for follow-up after a fall at home and an ER visit.  She fell in her yard in the dark.  She sustained a laceration to her left lower leg which was initially radiated.  She did present to the emergency room where she had an ultrasound.  She was started on antibiotics which she is still taking.  She noticed decreased swelling, redness and tenderness.  She complains of intermittent sciatic pain in her left lower extremity.  She also reports that he sometimes gives out.  She would like a shower chair.  She reports that she will be referred to physical therapy by her general surgeon.      Review of Systems   Musculoskeletal: Positive for arthralgias.   Skin: Positive for wound.   Neurological: Positive for weakness. Negative for numbness.       Objective:      Physical Exam   Constitutional: She is oriented to person, place, and time. She appears well-developed and well-nourished. No distress.   HENT:   Head: Normocephalic and atraumatic.   Eyes: Conjunctivae are normal. No scleral icterus.   Musculoskeletal: She exhibits edema. She exhibits no tenderness.   4-5 cm  Superficial laceration left anterior lower leg anterior to the knee without drainage, redness or induration.  Mild tenderness.   Neurological: She is alert and oriented to person, place, and time.   Skin: Skin is warm and dry.   Psychiatric: She has a normal mood and affect.   Vitals reviewed.      Assessment:       1. Leg injury, left, subsequent encounter    2. Essential hypertension    3. Left sided sciatica    4. Leg swelling    5. Tobacco abuse        Plan:       Audrey was seen today for fall, leg pain, nicotine dependence, joint swelling and foot problem.    Diagnoses and all  orders for this visit:    Leg injury, left, subsequent encounter - healing appropriately.  No sign of infection.  Complete doxycycline.    Essential hypertension  -     metoprolol tartrate (LOPRESSOR) 25 MG tablet; Take 1 tablet (25 mg total) by mouth once daily.    Left sided sciatica - to start physical therapy  -     methocarbamol (ROBAXIN) 750 MG Tab; Take 1 tablet (750 mg total) by mouth 4 (four) times daily as needed.  -     BATH/SHOWER CHAIR FOR HOME USE    Leg swelling - increase lasix to bid x 2-3 days only  -     BATH/SHOWER CHAIR FOR HOME USE  -     COMPRESSION SLEEVE/SOCK FOR HOME USE    Tobacco abuse  - restart nicotine patches  -     nicotine (NICODERM CQ) 14 mg/24 hr; Place 1 patch onto the skin once daily.       f/u prn

## 2017-08-21 ENCOUNTER — TELEPHONE (OUTPATIENT)
Dept: FAMILY MEDICINE | Facility: CLINIC | Age: 47
End: 2017-08-21

## 2017-08-21 RX ORDER — ALBUTEROL SULFATE 90 UG/1
2 AEROSOL, METERED RESPIRATORY (INHALATION) EVERY 6 HOURS PRN
Qty: 18 G | Refills: 2 | Status: SHIPPED | OUTPATIENT
Start: 2017-08-21 | End: 2017-10-30 | Stop reason: SDUPTHER

## 2017-08-21 NOTE — TELEPHONE ENCOUNTER
----- Message from Risa Li sent at 8/21/2017  1:55 PM CDT -----  Contact: Self   Refill : [facility] [address] [place] [address] [phone] [facility] [address] AT [facility]

## 2017-08-21 NOTE — TELEPHONE ENCOUNTER
----- Message from Emeli Riley sent at 8/21/2017  1:55 PM CDT -----  Contact: Aparna [phone] Jimy  Calling to collect clinical notes.

## 2017-08-22 ENCOUNTER — TELEPHONE (OUTPATIENT)
Dept: FAMILY MEDICINE | Facility: CLINIC | Age: 47
End: 2017-08-22

## 2017-08-22 NOTE — TELEPHONE ENCOUNTER
----- Message from Alena Plasencia sent at 8/21/2017  3:24 PM CDT -----  Contact: Self   Patient returned your call . Please call again  at [phone]

## 2017-08-24 ENCOUNTER — TELEPHONE (OUTPATIENT)
Dept: FAMILY MEDICINE | Facility: CLINIC | Age: 47
End: 2017-08-24

## 2017-08-25 ENCOUNTER — OFFICE VISIT (OUTPATIENT)
Dept: OPTOMETRY | Facility: CLINIC | Age: 47
End: 2017-08-25
Payer: MEDICAID

## 2017-08-25 DIAGNOSIS — H52.7 REFRACTIVE ERROR: ICD-10-CM

## 2017-08-25 DIAGNOSIS — H04.123 DRY EYE SYNDROME, BILATERAL: ICD-10-CM

## 2017-08-25 DIAGNOSIS — H43.393 VITREOUS FLOATERS, BILATERAL: ICD-10-CM

## 2017-08-25 DIAGNOSIS — H25.13 NUCLEAR SCLEROSIS, BILATERAL: ICD-10-CM

## 2017-08-25 DIAGNOSIS — H10.13 ALLERGIC CONJUNCTIVITIS, BILATERAL: ICD-10-CM

## 2017-08-25 DIAGNOSIS — E11.9 TYPE 2 DIABETES MELLITUS WITHOUT RETINOPATHY: Primary | ICD-10-CM

## 2017-08-25 PROCEDURE — 92015 DETERMINE REFRACTIVE STATE: CPT | Mod: ,,, | Performed by: OPTOMETRIST

## 2017-08-25 PROCEDURE — 99212 OFFICE O/P EST SF 10 MIN: CPT | Mod: PBBFAC,PO | Performed by: OPTOMETRIST

## 2017-08-25 PROCEDURE — 92014 COMPRE OPH EXAM EST PT 1/>: CPT | Mod: S$PBB,,, | Performed by: OPTOMETRIST

## 2017-08-25 PROCEDURE — 99999 PR PBB SHADOW E&M-EST. PATIENT-LVL II: CPT | Mod: PBBFAC,,, | Performed by: OPTOMETRIST

## 2017-08-25 NOTE — PROGRESS NOTES
Subjective:       Patient ID: Audrey Natarajan is a 47 y.o. female      Chief Complaint   Patient presents with    Diabetic Eye Exam    Eye Problem     History of Present Illness  Dls: 12/05/16 Dr. Welch    Pt states x 2 month blurry vision ou at near and distance. Pt c/o floaters   ou off/on some flashes ou off/on no ha's no pain.     Eye meds:   None    Hemoglobin A1C       Date                     Value               Ref Range           Status                05/15/2017               6.4 (H)             4.5 - 6.2 %         Final                   11/08/2016               10.2 (H)            4.5 - 6.2 %         Final                  08/03/2016               14.1 (H)            4.5 - 6.2 %         Final             ----------     Assessment/Plan:     1. Type 2 diabetes mellitus without retinopathy  H/o bariatric surgery 08/2016. No diabetic retinopathy. Educated patient on importance of good blood sugar control, compliance with meds, and follow up care with PCP. Return in 1 year for dilated eye exam, sooner PRN.    2. Nuclear sclerosis, bilateral  Educated pt on presence of cataracts and effects on vision. No surgery at this time. Recheck in one year.    3. Dry eye syndrome, bilateral  Recommend artificial tears. 1 drop 4x per day and PRN. Discussed different drop options - AT/PFAT/gel/ointment. Chronicity of disease and treatment discussed.    4. Allergic conjunctivitis, bilateral  Recommend Zaditor or Alaway BID OU PRN for itchiness. Artificial tears 3-4/xday.     5. Vitreous floaters, bilateral  Discussed causes of flashes and floaters with the patient and described the warnings of a possible retinal detachment. Advised patient to call if there is an increase in flashes or floaters.    6. Refractive error  Small hyperopic shift in Rx. Most likely due to change in A1c, change from 10.2 (11/8/16) to 6.4 (5/15/17). Discussed with pt that changes in sugar and cause changes in vision. Trial framed today's MRx and  pt reports vision is easier to see. Educated patient on refractive error and discussed lens options. Dispensed updated spectacle Rx. Educated about adaptation period to new specs.    Eyeglass Final Rx     Eyeglass Final Rx       Sphere Cylinder Axis Add    Right -0.25 +0.25 170 +1.50    Left -0.50 +0.50 165 +1.50    Type:  PAL    Expiration Date:  8/26/2018                Return in about 1 year (around 8/25/2018) for Diabetic Eye Exam.

## 2017-09-06 DIAGNOSIS — M79.671 BILATERAL FOOT PAIN: ICD-10-CM

## 2017-09-06 DIAGNOSIS — M79.672 BILATERAL FOOT PAIN: ICD-10-CM

## 2017-09-06 RX ORDER — HYDROCODONE BITARTRATE AND ACETAMINOPHEN 7.5; 325 MG/1; MG/1
1 TABLET ORAL EVERY 8 HOURS PRN
Qty: 21 TABLET | Refills: 0 | Status: SHIPPED | OUTPATIENT
Start: 2017-09-06 | End: 2017-09-14 | Stop reason: SDUPTHER

## 2017-09-06 NOTE — TELEPHONE ENCOUNTER
----- Message from Yamileth Cristian sent at 9/6/2017  8:37 AM CDT -----  Contact: self  Patient is requesting refill on hydrocodone-acetaminophen . Patient states she is having left side pain. (per patient, doctor knows her history). Patient can be reached at [phone].      Thanks,

## 2017-09-12 ENCOUNTER — TELEPHONE (OUTPATIENT)
Dept: FAMILY MEDICINE | Facility: CLINIC | Age: 47
End: 2017-09-12

## 2017-09-12 DIAGNOSIS — Z72.0 TOBACCO USE: Primary | ICD-10-CM

## 2017-09-13 DIAGNOSIS — M79.671 BILATERAL FOOT PAIN: ICD-10-CM

## 2017-09-13 DIAGNOSIS — M79.672 BILATERAL FOOT PAIN: ICD-10-CM

## 2017-09-13 RX ORDER — HYDROCODONE BITARTRATE AND ACETAMINOPHEN 7.5; 325 MG/1; MG/1
1 TABLET ORAL EVERY 8 HOURS PRN
Qty: 21 TABLET | Refills: 0 | OUTPATIENT
Start: 2017-09-13 | End: 2017-10-13

## 2017-09-14 ENCOUNTER — TELEPHONE (OUTPATIENT)
Dept: FAMILY MEDICINE | Facility: CLINIC | Age: 47
End: 2017-09-14

## 2017-09-14 DIAGNOSIS — M79.672 BILATERAL FOOT PAIN: ICD-10-CM

## 2017-09-14 DIAGNOSIS — M79.671 BILATERAL FOOT PAIN: ICD-10-CM

## 2017-09-14 RX ORDER — HYDROCODONE BITARTRATE AND ACETAMINOPHEN 7.5; 325 MG/1; MG/1
1 TABLET ORAL EVERY 12 HOURS PRN
Qty: 30 TABLET | Refills: 0 | Status: SHIPPED | OUTPATIENT
Start: 2017-09-14 | End: 2017-10-04

## 2017-09-14 NOTE — TELEPHONE ENCOUNTER
----- Message from Shondacatrachitajamee Hugh sent at 9/14/2017 10:02 AM CDT -----  Contact: Self/[phone]  Patient returned staff's call.  She states that she's going out of town on tomorrow. She would like to speak to the staff regarding this matter.  Thank you.

## 2017-09-14 NOTE — TELEPHONE ENCOUNTER
Patient is requesting a refill on Norco. Patient states that she's leaving town today and must have them filled her because her medicaid is not accepted in [place]. LOV 08/18/2017. Order pended, please sign if appropriate.

## 2017-09-18 DIAGNOSIS — E11.59 HYPERTENSION ASSOCIATED WITH DIABETES: ICD-10-CM

## 2017-09-18 DIAGNOSIS — M54.32 LEFT SIDED SCIATICA: ICD-10-CM

## 2017-09-18 DIAGNOSIS — I15.2 HYPERTENSION ASSOCIATED WITH DIABETES: ICD-10-CM

## 2017-09-18 RX ORDER — GABAPENTIN 600 MG/1
TABLET ORAL
Qty: 180 TABLET | Refills: 1 | Status: SHIPPED | OUTPATIENT
Start: 2017-09-18 | End: 2017-11-17 | Stop reason: SDUPTHER

## 2017-09-18 RX ORDER — FUROSEMIDE 20 MG/1
TABLET ORAL
Qty: 30 TABLET | Refills: 1 | Status: SHIPPED | OUTPATIENT
Start: 2017-09-18 | End: 2017-11-20 | Stop reason: SDUPTHER

## 2017-10-04 ENCOUNTER — OFFICE VISIT (OUTPATIENT)
Dept: URGENT CARE | Facility: CLINIC | Age: 47
End: 2017-10-04
Payer: MEDICAID

## 2017-10-04 ENCOUNTER — TELEPHONE (OUTPATIENT)
Dept: SMOKING CESSATION | Facility: CLINIC | Age: 47
End: 2017-10-04

## 2017-10-04 VITALS
OXYGEN SATURATION: 99 % | TEMPERATURE: 99 F | WEIGHT: 219 LBS | RESPIRATION RATE: 12 BRPM | BODY MASS INDEX: 35.2 KG/M2 | HEIGHT: 66 IN | DIASTOLIC BLOOD PRESSURE: 71 MMHG | SYSTOLIC BLOOD PRESSURE: 112 MMHG | HEART RATE: 77 BPM

## 2017-10-04 DIAGNOSIS — R05.9 COUGH IN ADULT PATIENT: ICD-10-CM

## 2017-10-04 DIAGNOSIS — S20.211A CHEST WALL CONTUSION, RIGHT, INITIAL ENCOUNTER: ICD-10-CM

## 2017-10-04 DIAGNOSIS — J32.9 SINUSITIS, UNSPECIFIED CHRONICITY, UNSPECIFIED LOCATION: Primary | ICD-10-CM

## 2017-10-04 PROCEDURE — 99213 OFFICE O/P EST LOW 20 MIN: CPT | Mod: 25,S$GLB,, | Performed by: NURSE PRACTITIONER

## 2017-10-04 RX ORDER — MELOXICAM 7.5 MG/1
TABLET ORAL
Refills: 2 | COMMUNITY
Start: 2017-09-05 | End: 2017-10-24 | Stop reason: SDUPTHER

## 2017-10-04 RX ORDER — DEXAMETHASONE SODIUM PHOSPHATE 100 MG/10ML
6 INJECTION INTRAMUSCULAR; INTRAVENOUS
Status: COMPLETED | OUTPATIENT
Start: 2017-10-04 | End: 2017-10-04

## 2017-10-04 RX ORDER — AZITHROMYCIN 250 MG/1
TABLET, FILM COATED ORAL
Qty: 6 TABLET | Refills: 0 | Status: SHIPPED | OUTPATIENT
Start: 2017-10-04 | End: 2017-10-09

## 2017-10-04 RX ORDER — FLUTICASONE PROPIONATE 50 MCG
1 SPRAY, SUSPENSION (ML) NASAL DAILY
Qty: 1 BOTTLE | Refills: 0 | Status: SHIPPED | OUTPATIENT
Start: 2017-10-04 | End: 2017-10-31

## 2017-10-04 RX ADMIN — DEXAMETHASONE SODIUM PHOSPHATE 6 MG: 100 INJECTION INTRAMUSCULAR; INTRAVENOUS at 12:10

## 2017-10-04 NOTE — TELEPHONE ENCOUNTER
[facility]- called to check on patient, no show for intake appointment. Left message to call back to reschedule   [phone].

## 2017-10-04 NOTE — PROGRESS NOTES
"Subjective:       Patient ID: Audrey Natarajan is a 47 y.o. female.    Vitals:  height is 5' 6" (1.676 m) and weight is 99.3 kg (219 lb). Her tympanic temperature is 98.7 °F (37.1 °C). Her blood pressure is 112/71 and her pulse is 77. Her respiration is 12 and oxygen saturation is 99%.     Chief Complaint: URI and Back Pain    States she is having right lateral chest wall pain which has been ongoing for 10 days after she fell at her sisters house. Also states she has been having cold symptoms for more than 1 week and has been taking OTC cold meds with some relief.       URI    This is a new problem. The current episode started in the past 7 days. The problem has been gradually worsening. There has been no fever. Associated symptoms include congestion, coughing, rhinorrhea and sneezing. Pertinent negatives include no abdominal pain, chest pain, dysuria, ear pain, headaches, nausea, plugged ear sensation, rash, sore throat or wheezing. She has tried antihistamine and inhaler use (Nyquil) for the symptoms. The treatment provided mild relief.   Back Pain   This is a new problem. The current episode started in the past 7 days. The problem occurs constantly. The problem is unchanged. The pain is present in the costovertebral angle. The quality of the pain is described as aching. The pain does not radiate. The pain is at a severity of 6/10. The pain is moderate. The pain is the same all the time. The symptoms are aggravated by coughing. Pertinent negatives include no abdominal pain, bladder incontinence, bowel incontinence, chest pain, dysuria, fever, headaches or numbness. Risk factors include recent trauma, obesity and lack of exercise. She has tried muscle relaxant (Meloxicam) for the symptoms. The treatment provided mild relief.     Review of Systems   Constitution: Positive for decreased appetite and malaise/fatigue. Negative for chills and fever.   HENT: Positive for congestion, hoarse voice, rhinorrhea and " sneezing. Negative for ear pain and sore throat.    Eyes: Negative for discharge and redness.   Cardiovascular: Negative for chest pain, dyspnea on exertion and leg swelling.   Respiratory: Positive for cough and sputum production. Negative for shortness of breath and wheezing.    Skin: Positive for color change. Negative for rash.   Musculoskeletal: Positive for back pain and stiffness. Negative for muscle cramps, muscle weakness and myalgias.   Gastrointestinal: Negative for abdominal pain, bowel incontinence and nausea.   Genitourinary: Negative for bladder incontinence, dysuria, hematuria and urgency.   Neurological: Negative for disturbances in coordination, headaches and numbness.   All other systems reviewed and are negative.      Objective:      Physical Exam   Constitutional: She is oriented to person, place, and time. She appears well-developed and well-nourished.  Non-toxic appearance. She does not have a sickly appearance. She does not appear ill.   Appears older than stated age, unkempt   HENT:   Head: Normocephalic and atraumatic.   Right Ear: Hearing, tympanic membrane, external ear and ear canal normal.   Left Ear: Hearing, tympanic membrane, external ear and ear canal normal.   Nose: Mucosal edema and rhinorrhea present. Right sinus exhibits frontal sinus tenderness. Right sinus exhibits no maxillary sinus tenderness. Left sinus exhibits frontal sinus tenderness. Left sinus exhibits no maxillary sinus tenderness.   Mouth/Throat: Uvula is midline and mucous membranes are normal. Oropharyngeal exudate present.   Eyes: Conjunctivae and EOM are normal. Pupils are equal, round, and reactive to light.   Neck: Normal range of motion. Neck supple.   Cardiovascular: Normal rate, regular rhythm and normal heart sounds.    Pulmonary/Chest: Effort normal and breath sounds normal. She exhibits tenderness.       Musculoskeletal: Normal range of motion.   Lymphadenopathy:     She has no cervical adenopathy.    Neurological: She is alert and oriented to person, place, and time.   Skin: Skin is warm and dry. Capillary refill takes less than 2 seconds.   Psychiatric: She has a normal mood and affect.   Nursing note and vitals reviewed.      Assessment:       1. Sinusitis, unspecified chronicity, unspecified location    2. Chest wall contusion, right, initial encounter    3. Cough in adult patient        Plan:         Sinusitis, unspecified chronicity, unspecified location  -     dexamethasone injection 6 mg; Inject 0.6 mLs (6 mg total) into the muscle one time.  -     fluticasone (FLONASE) 50 mcg/actuation nasal spray; 1 spray by Each Nare route once daily.  Dispense: 1 Bottle; Refill: 0  -     azithromycin (Z-TAMEKA) 250 MG tablet; Take 2 tablets by mouth on day 1; Take 1 tablet by mouth on days 2-5  Dispense: 6 tablet; Refill: 0    Chest wall contusion, right, initial encounter  -     dexamethasone injection 6 mg; Inject 0.6 mLs (6 mg total) into the muscle one time.    Cough in adult patient  -     dexamethasone injection 6 mg; Inject 0.6 mLs (6 mg total) into the muscle one time.

## 2017-10-04 NOTE — PATIENT INSTRUCTIONS
Please take the meloxicam that you have at home for your chest wall pain.   If symptoms persist please follow up with your PCP.       Chest Contusion    A contusion is a bruise to the skin, muscle, or ribs. It may cause pain, tenderness, and swelling. It may turn the skin purple until it heals. Contusions take a few days to a few weeks to heal.  Home care  Follow these guidelines when caring for yourself at home:  · Rest. Dont do any heavy lifting or strenuous activity. Dont do any activity that causes pain.  · Put an ice pack on the injured area. Do this for 20 minutes every 1 to 2 hours the first day. You can make an ice pack by wrapping a plastic bag of ice cubes in a thin towel. Continue to use the ice pack 3 to 4 times a day for the next 2 days. Then use the ice pack as needed to ease pain and swelling.  · After 1 to 2 days you may put a warm compress on the area. Do this for 10 minutes several times a day. A warm compress is a clean cloth thats damp with warm water.  · Hold a pillow to the affected area when you cough. This will help ease pain.  · You may use acetaminophen or ibuprofen to control pain, unless another pain medicine was prescribed. If you have chronic liver or kidney disease, talk with your health care provider before using these medicines. Also talk with your provider if youve had a stomach ulcer or GI bleeding.  Follow-up care  Follow up with your health care provider during the next week, or as advised.  When to seek medical advice  Call your health care provider right away if any of these occur:  · Shortness of breath, difficulty breathing, or breathing fast  · Chest pain gets worse when you breathe  · Severe pain that comes on suddenly or lasts more than an hour  · Dizziness, weakness, or fainting  · New abdominal pain or abdominal pain that gets worse  ·  Fever of 101ºF (38.3ºC) or higher, or as directed by your health care provider  Date Last Reviewed: 2/15/2015  © 7603-4886 [employer]. [address] [place] [address], [place] [address]. All rights reserved. This information is not intended as a substitute for professional medical care. Always follow your healthcare professional's instructions.        Sinusitis (Antibiotic Treatment)    The sinuses are air-filled spaces within the bones of the face. They connect to the inside of the nose. Sinusitis is an inflammation of the tissue lining the sinus cavity. Sinus inflammation can occur during a cold. It can also be due to allergies to pollens and other particles in the air. Sinusitis can cause symptoms of sinus congestion and fullness. A sinus infection causes fever, headache and facial pain. There is often green or yellow drainage from the nose or into the back of the throat (post-nasal drip). You have been given antibiotics to treat this condition.  Home care:  · Take the full course of antibiotics as instructed. Do not stop taking them, even if you feel better.  · Drink plenty of water, hot tea, and other liquids. This may help thin mucus. It also may promote sinus drainage.  · Heat may help soothe painful areas of the face. Use a towel soaked in hot water. Or,  the shower and direct the hot spray onto your face. Using a vaporizer along with a menthol rub at night may also help.   · An expectorant containing guaifenesin may help thin the mucus and promote drainage from the sinuses.  · Over-the-counter decongestants may be used unless a similar medicine was prescribed. Nasal sprays work the fastest. Use one that contains phenylephrine or oxymetazoline. First blow the nose gently. Then use the spray. Do not use these medicines more often than directed on the label or symptoms may get worse. You may also use tablets containing pseudoephedrine. Avoid products that combine ingredients, because side effects may be increased. Read labels. You can also ask the pharmacist for help. (NOTE: Persons with high blood pressure should not  use decongestants. They can raise blood pressure.)  · Over-the-counter antihistamines may help if allergies contributed to your sinusitis.    · Do not use nasal rinses or irrigation during an acute sinus infection, unless told to by your health care provider. Rinsing may spread the infection to other sinuses.  · Use acetaminophen or ibuprofen to control pain, unless another pain medicine was prescribed. (If you have chronic liver or kidney disease or ever had a stomach ulcer, talk with your doctor before using these medicines. Aspirin should never be used in anyone under 18 years of age who is ill with a fever. It may cause severe liver damage.)  · Don't smoke. This can worsen symptoms.  Follow-up care  Follow up with your healthcare provider or our staff if you are not improving within the next week.  When to seek medical advice  Call your healthcare provider if any of these occur:  · Facial pain or headache becoming more severe  · Stiff neck  · Unusual drowsiness or confusion  · Swelling of the forehead or eyelids  · Vision problems, including blurred or double vision  · Fever of 100.4ºF (38ºC) or higher, or as directed by your healthcare provider  · Seizure  · Breathing problems  · Symptoms not resolving within 10 days  Date Last Reviewed: 4/13/2015  © 0449-1948 [employer]. [address] [place] [address], [place] [address]. All rights reserved. This information is not intended as a substitute for professional medical care. Always follow your healthcare professional's instructions.        Cough, Chronic, Uncertain Cause (Adult)    Everyone has had a cough as part of the common cold, flu, or bronchitis. This kind of cough occurs along with an achy feeling, low-grade fever, nasal and sinus congestion, and a scratchy or sore throat. This usually gets better in 2 to 3 weeks. A cough that lasts longer than 3 weeks may be due to other causes.  If your cough does not improve over the next 2 weeks, further testing  may be needed. Follow up with your healthcare provider as advised. Cough suppressants may be recommended. Based on your exam today, the exact cause of your cough is not certain. Below are some common causes for persistent cough.  Smokers cough  Smokers cough doesnt go away. If you continue to smoke, it only gets worse. The cough is from irritation in the air passages. Talk to your healthcare provider about quitting. Medicines or nicotine-replacement products, like gum or the patch, may make quitting easier.  Postnasal drip  A cough that is worse at night may be due to postnasal drip. Excess mucus in the nose drains from the back of your nose to your throat. This triggers the cough reflex. Postnasal drip may be due to a sinus infection or allergy. Common allergens include dust, tobacco smoke (both inhaled and secondhand smoke), environmental pollutants, pollen, mold, pets, cleaning agents, room deodorizers, and chemical fumes. Over-the-counter antihistamines or decongestants may be helpful for allergies. A sinus infection may requires antibiotic treatment. See your healthcare provider if symptoms continue.  Medicines  Certain prescribed medicines can cause a chronic cough in some people:  · ACE inhibitors for high blood pressure. These include benazepril, captopril, enalapril, fosinopril, lisinopril, quinapril, ramipril, and others.  · Beta-blockers for high blood pressure and other conditions. These include propranolol, atenolol, metoprolol, nadolol, and others.  Let your healthcare provider know if you are taking any of these.  Asthma  Cough may be the only sign of mild asthma. You may have tests to find out if asthma is causing your cough. You may also take asthma medicine on a trial basis.  Acid reflux (heartburn, GERD)  The esophagus is the tube that carries food from the mouth to the stomach. A valve at its lower end prevents stomach acids from flowing upward. If this valve does not work properly, acid from  the stomach enters the esophagus. This may cause a burning pain in the upper abdomen or lower chest, belching, or cough. Symptoms are often worse when lying flat. Avoid eating or drinking before bedtime. Try using extra pillows to raise your upper body, or place 4-inch blocks under the head of your bed. You may try an over-the-counter antacid or an acid-blocking medicine such as famotidine, cimetidine, ranitidine, esomeprazole, lansoprazole, or omeprazole. Stronger medicines for this condition can be prescribed by your healthcare provider.  Follow-up care  Follow up with your healthcare provider, or as advised, if your cough does not improve. Further testing may be needed.  Note: If an X-ray was taken, a specialist will review it. You will be notified of any new findings that may affect your care.  When to seek medical advice  Call your healthcare provider right away if any of these occur:  · Mild wheezing or difficulty breathing  · Fever of 100.4ºF (38ºC) or higher, or as directed by your healthcare provider  · Unexpected weight loss  · Coughing up large amounts of colored sputum  · Night sweats (sheets and pajamas get soaking wet)  Call 911, or get immediate medical care  Contact emergency services right away if any of these occur:  · Coughing up blood  · Moderate to severe trouble breathing or wheezing  Date Last Reviewed: 9/13/2015  © 1856-5805 [employer]. [address] [place] [address], [place] [address]. All rights reserved. This information is not intended as a substitute for professional medical care. Always follow your healthcare professional's instructions.

## 2017-10-05 ENCOUNTER — TELEPHONE (OUTPATIENT)
Dept: FAMILY MEDICINE | Facility: CLINIC | Age: 47
End: 2017-10-05

## 2017-10-05 NOTE — TELEPHONE ENCOUNTER
----- Message from Alena Plasencia sent at 10/5/2017 11:26 AM CDT -----  Contact: Self   Patient need a PA for her medication . Please call patient at [phone].      meloxicam (MOBIC) 7.5 MG tablet  fluticasone (FLONASE) 50 mcg/actuation nasal spray

## 2017-10-24 ENCOUNTER — OFFICE VISIT (OUTPATIENT)
Dept: FAMILY MEDICINE | Facility: CLINIC | Age: 47
End: 2017-10-24
Payer: MEDICAID

## 2017-10-24 VITALS
BODY MASS INDEX: 33.94 KG/M2 | OXYGEN SATURATION: 97 % | TEMPERATURE: 99 F | SYSTOLIC BLOOD PRESSURE: 138 MMHG | DIASTOLIC BLOOD PRESSURE: 84 MMHG | RESPIRATION RATE: 18 BRPM | HEART RATE: 95 BPM | HEIGHT: 66 IN | WEIGHT: 211.19 LBS

## 2017-10-24 DIAGNOSIS — G89.4 CHRONIC PAIN SYNDROME: ICD-10-CM

## 2017-10-24 DIAGNOSIS — E78.1 HYPERTRIGLYCERIDEMIA: Chronic | ICD-10-CM

## 2017-10-24 DIAGNOSIS — Z23 NEED FOR PNEUMOCOCCAL VACCINATION: ICD-10-CM

## 2017-10-24 DIAGNOSIS — Z23 NEED FOR PROPHYLACTIC VACCINATION AND INOCULATION AGAINST INFLUENZA: ICD-10-CM

## 2017-10-24 DIAGNOSIS — E11.9 TYPE 2 DIABETES MELLITUS WITHOUT COMPLICATION, WITHOUT LONG-TERM CURRENT USE OF INSULIN: Primary | ICD-10-CM

## 2017-10-24 DIAGNOSIS — I10 ESSENTIAL HYPERTENSION: Chronic | ICD-10-CM

## 2017-10-24 DIAGNOSIS — D72.829 LEUKOCYTOSIS, UNSPECIFIED TYPE: ICD-10-CM

## 2017-10-24 DIAGNOSIS — Z12.31 ENCOUNTER FOR SCREENING MAMMOGRAM FOR BREAST CANCER: ICD-10-CM

## 2017-10-24 DIAGNOSIS — R29.6 FREQUENT FALLS: ICD-10-CM

## 2017-10-24 DIAGNOSIS — M25.551 RIGHT HIP PAIN: ICD-10-CM

## 2017-10-24 PROCEDURE — 90472 IMMUNIZATION ADMIN EACH ADD: CPT | Mod: PBBFAC,PN

## 2017-10-24 PROCEDURE — 90732 PPSV23 VACC 2 YRS+ SUBQ/IM: CPT | Mod: PBBFAC,PN

## 2017-10-24 PROCEDURE — 99214 OFFICE O/P EST MOD 30 MIN: CPT | Mod: S$PBB,,, | Performed by: INTERNAL MEDICINE

## 2017-10-24 PROCEDURE — 99999 PR PBB SHADOW E&M-EST. PATIENT-LVL IV: CPT | Mod: PBBFAC,,, | Performed by: INTERNAL MEDICINE

## 2017-10-24 PROCEDURE — 99214 OFFICE O/P EST MOD 30 MIN: CPT | Mod: PBBFAC,PN | Performed by: INTERNAL MEDICINE

## 2017-10-24 PROCEDURE — 90686 IIV4 VACC NO PRSV 0.5 ML IM: CPT | Mod: PBBFAC,PN

## 2017-10-24 RX ORDER — MELOXICAM 15 MG/1
15 TABLET ORAL DAILY
Qty: 30 TABLET | Refills: 2 | Status: SHIPPED | OUTPATIENT
Start: 2017-10-24 | End: 2017-11-16
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
## 2017-10-30 RX ORDER — ALBUTEROL SULFATE 90 UG/1
AEROSOL, METERED RESPIRATORY (INHALATION)
Qty: 18 G | Refills: 2 | Status: SHIPPED | OUTPATIENT
Start: 2017-10-30 | End: 2018-02-21 | Stop reason: SDUPTHER

## 2017-10-30 RX ORDER — ALBUTEROL SULFATE 90 UG/1
AEROSOL, METERED RESPIRATORY (INHALATION)
Qty: 18 G | Refills: 2 | Status: SHIPPED | OUTPATIENT
Start: 2017-10-30 | End: 2017-12-02 | Stop reason: SDUPTHER

## 2017-10-31 ENCOUNTER — NURSE TRIAGE (OUTPATIENT)
Dept: ADMINISTRATIVE | Facility: CLINIC | Age: 47
End: 2017-10-31

## 2017-10-31 ENCOUNTER — OFFICE VISIT (OUTPATIENT)
Dept: URGENT CARE | Facility: CLINIC | Age: 47
End: 2017-10-31
Payer: MEDICAID

## 2017-10-31 ENCOUNTER — HOSPITAL ENCOUNTER (EMERGENCY)
Facility: HOSPITAL | Age: 47
Discharge: HOME | End: 2017-10-31
Attending: EMERGENCY MEDICINE
Payer: MEDICAID

## 2017-10-31 VITALS
TEMPERATURE: 98 F | BODY MASS INDEX: 33.91 KG/M2 | WEIGHT: 211 LBS | HEIGHT: 66 IN | DIASTOLIC BLOOD PRESSURE: 69 MMHG | RESPIRATION RATE: 20 BRPM | SYSTOLIC BLOOD PRESSURE: 139 MMHG | HEART RATE: 94 BPM | OXYGEN SATURATION: 99 %

## 2017-10-31 VITALS
BODY MASS INDEX: 33.91 KG/M2 | HEART RATE: 100 BPM | OXYGEN SATURATION: 96 % | HEIGHT: 66 IN | WEIGHT: 211 LBS | TEMPERATURE: 98 F | DIASTOLIC BLOOD PRESSURE: 66 MMHG | SYSTOLIC BLOOD PRESSURE: 110 MMHG | RESPIRATION RATE: 12 BRPM

## 2017-10-31 DIAGNOSIS — J06.9 URI WITH COUGH AND CONGESTION: ICD-10-CM

## 2017-10-31 DIAGNOSIS — H66.012 ACUTE SUPPURATIVE OTITIS MEDIA OF LEFT EAR WITH SPONTANEOUS RUPTURE OF TYMPANIC MEMBRANE, RECURRENCE NOT SPECIFIED: Primary | ICD-10-CM

## 2017-10-31 DIAGNOSIS — H92.02 OTALGIA OF LEFT EAR: Primary | ICD-10-CM

## 2017-10-31 DIAGNOSIS — J06.9 UPPER RESPIRATORY TRACT INFECTION, UNSPECIFIED TYPE: ICD-10-CM

## 2017-10-31 PROCEDURE — 99283 EMERGENCY DEPT VISIT LOW MDM: CPT | Mod: 25

## 2017-10-31 PROCEDURE — 96372 THER/PROPH/DIAG INJ SC/IM: CPT

## 2017-10-31 PROCEDURE — 63600175 PHARM REV CODE 636 W HCPCS: Performed by: NURSE PRACTITIONER

## 2017-10-31 PROCEDURE — 99213 OFFICE O/P EST LOW 20 MIN: CPT | Mod: S$GLB,,, | Performed by: NURSE PRACTITIONER

## 2017-10-31 RX ORDER — CLARITHROMYCIN 500 MG/1
500 TABLET, FILM COATED ORAL EVERY 12 HOURS
Qty: 20 TABLET | Refills: 0 | Status: SHIPPED | OUTPATIENT
Start: 2017-10-31 | End: 2017-11-10

## 2017-10-31 RX ORDER — CODEINE PHOSPHATE AND GUAIFENESIN 10; 100 MG/5ML; MG/5ML
10 SOLUTION ORAL EVERY 6 HOURS PRN
Qty: 120 ML | Refills: 0 | Status: SHIPPED | OUTPATIENT
Start: 2017-10-31 | End: 2017-11-02

## 2017-10-31 RX ORDER — CIPROFLOXACIN 0.5 MG/.25ML
0.25 SOLUTION/ DROPS AURICULAR (OTIC) 2 TIMES DAILY
Qty: 20 VIAL | Refills: 0 | Status: SHIPPED | OUTPATIENT
Start: 2017-10-31 | End: 2017-11-10

## 2017-10-31 RX ORDER — FLUTICASONE PROPIONATE 50 MCG
1 SPRAY, SUSPENSION (ML) NASAL DAILY
Qty: 1 BOTTLE | Refills: 0 | Status: SHIPPED | OUTPATIENT
Start: 2017-10-31 | End: 2018-02-21 | Stop reason: SDUPTHER

## 2017-10-31 RX ORDER — SULINDAC 150 MG/1
150 TABLET ORAL 2 TIMES DAILY
Qty: 10 TABLET | Refills: 0 | Status: SHIPPED | OUTPATIENT
Start: 2017-10-31 | End: 2017-11-19 | Stop reason: ALTCHOICE

## 2017-10-31 RX ORDER — KETOROLAC TROMETHAMINE 30 MG/ML
10 INJECTION, SOLUTION INTRAMUSCULAR; INTRAVENOUS
Status: COMPLETED | OUTPATIENT
Start: 2017-10-31 | End: 2017-10-31

## 2017-10-31 RX ADMIN — KETOROLAC TROMETHAMINE 10 MG: 30 INJECTION, SOLUTION INTRAMUSCULAR at 07:10

## 2017-10-31 NOTE — TELEPHONE ENCOUNTER
Reason for Disposition   No answer.  First attempt to contact caller.  Follow-up call scheduled within 15 minutes.     No answer x two call attempts.   Second attempt to contact family AND no contact made.  Answering service notified.    Protocols used: ST NO CONTACT OR DUPLICATE CONTACT CALL-A-AH

## 2017-10-31 NOTE — PATIENT INSTRUCTIONS
Middle Ear Infection (Adult)  You have an infection of the middle ear, the space behind the eardrum. This is also called acute otitis media (AOM). Sometimes it is caused by the common cold. This is because congestion can block the internal passage (eustachian tube) that drains fluid from the middle ear. When the middle ear fills with fluid, bacteria can grow there and cause an infection. Oral antibiotics are used to treat this illness, not ear drops. Symptoms usually start to improve within 1 to 2 days of treatment.    Home care  The following are general care guidelines:  · Finish all of the antibiotic medicine given, even though you may feel better after the first few days.  · You may use over-the-counter medicine, such as acetaminophen or ibuprofen, to control pain and fever, unless something else was prescribed. If you have chronic liver or kidney disease or have ever had a stomach ulcer or gastrointestinal bleeding, talk with your healthcare provider before using these medicines. Do not give aspirin to anyone under 18 years of age who has a fever. It may cause severe illness or death.  Follow-up care  Follow up with your healthcare provider, or as advised, in 2 weeks if all symptoms have not gotten better, or if hearing doesn't go back to normal within 1 month.  When to seek medical advice  Call your healthcare provider right away if any of these occur:  · Ear pain gets worse or does not improve after 3 days of treatment  · Unusual drowsiness or confusion  · Neck pain, stiff neck, or headache  · Fluid or blood draining from the ear canal  · Fever of 100.4°F (38°C) or as advised   · Seizure  Date Last Reviewed: 6/1/2016  © 0278-8169 [employer]. [address] [place] [address], [place] [address]. All rights reserved. This information is not intended as a substitute for professional medical care. Always follow your healthcare professional's instructions.        Eardrum Rupture (Perforation)    Your eardrum  is a thin membrane between your outer and middle ear. Sound waves entering your ear cause the membrane to vibrate. This helps you hear. An injury or infection can cause your eardrum to tear (rupture). This creates a hole (perforation) that may affect your hearing.  Causes of eardrum perforation  Causes of a ruptured eardrum include:  · Pressure from an ear infection  · Putting an object such as a cotton swab or pencil into the ear  · A very loud noise such as a gunshot close to the ear  · Rapid changes in air pressure. These can happen during scuba diving or traveling at high altitudes.  · A slap or blow to the ear  When to go to the emergency room (ER)  Seek medical care right away if you:  · Have severe pain, bleeding, or ringing in your ear.  · Lose your hearing suddenly.  · Become very dizzy for no reason.  · Have an object lodged in your ear.  A ruptured eardrum from an ear infection usually isn't an emergency. In fact, the rupture often relieves pressure and pain. It usually heals within hours or days. But you should have the ear looked at by a healthcare provider within 24 hours.  What to expect in the ER  Your ear will be examined. Treatment will depend on how severe the damage is. Small holes often heal on their own. A small patch may be placed over a minor eardrum tear. Large tears may need to be repaired during an operation. If you are very dizzy or have severe hearing loss, you are likely to stay in the hospital for treatment for one or more days.  Don't clean inside the ear canal with cotton swabs or any other object.   Date Last Reviewed: 10/1/2016  © 3374-9456 [employer]. [address] [place] [address], [place] [address]. All rights reserved. This information is not intended as a substitute for professional medical care. Always follow your healthcare professional's instructions.

## 2017-10-31 NOTE — ED PROVIDER NOTES
"Encounter Date: 10/31/2017       History     Chief Complaint   Patient presents with    Otalgia     "I went to urgent care this morning for earache (left) and they gave me antibiotics but my ear still hurts and neck pain. I need something for pain"     Chief complaint: Otalgia    History of present illness: Patient reports 4 days ago she began have left ear and neck pain.  She has been taking clindamycin, ciprofloxacin eardrops for superlative otitis media with rupture of membranes and upper respiratory infection.  She's also been using Flonase.  Current severity pain is 10 over 10.  She denies fever, chills.  She reports she believes she is getting better except for the level of pain and discomfort.      The history is provided by the patient. No  was used.     Review of patient's allergies indicates:   Allergen Reactions    Morphine Other (See Comments)     Patient had a psychotic episode after taking Morphine  Agitation, hallucinations    Penicillins Anaphylaxis     itching     Past Medical History:   Diagnosis Date    Arthritis     Asthma     Bipolar 1 disorder     COPD (chronic obstructive pulmonary disease)     Coronary artery disease     A fib    Depression     bipolar manic depresson    Diabetes mellitus     DVT of lower extremity, bilateral July 2013    bilateral LE DVT. Estelita filter placed.     Encounter for blood transfusion     History of blood clots 1. Left Leg=2003; 2.Bilateral Groin=Blood Clots= 5 or 6/ 2013 & 7/2013; 3. LLL of Lung=7/2013;  4. Lt. Lower Leg=7/2013.     Pt. had 1st Blood Clot after [id number], & Last=2013. Stamps Filter= Rt.Lateral Neck.    HTN (hypertension) 6/6/2013    Pt states that she does not have hypertension    Hypercholesteremia     Irregular heartbeat     Neuromuscular disorder     neuropathy feet    PE (pulmonary embolism) July 2013     bilat LE DVT.     Restless leg syndrome      Past Surgical History:   Procedure " "Laterality Date    ABDOMINAL SURGERY      BILATERAL OOPHORECTOMY Bilateral 2015    Green' s filter Right 2012    Right Neck & Tunneled Down.    HERNIA REPAIR      "Hope of Hernias Repaires around th Belly Button.", pt. states    OVARIAN CYST REMOVAL  3/13/2014    UT REMOVAL OF OVARY/TUBE(S)      SPLENECTOMY, TOTAL  2003    TONSILLECTOMY      as a child    TYMPANOSTOMY TUBE PLACEMENT  1976    VEIN SURGERY      Lt leg     Family History   Problem Relation Age of Onset    Hypertension Father     Diabetes Father     Heart disease Father     Cataracts Father     Diabetes Paternal Grandfather     Heart disease Paternal Grandfather     No Known Problems Mother     Ovarian cancer Maternal Grandmother       from this. ? age     No Known Problems Sister     No Known Problems Brother     No Known Problems Maternal Aunt     No Known Problems Maternal Uncle     No Known Problems Paternal Aunt     No Known Problems Paternal Uncle     No Known Problems Maternal Grandfather     No Known Problems Paternal Grandmother     Uterine cancer Neg Hx     Breast cancer Neg Hx     Colon cancer Neg Hx     Amblyopia Neg Hx     Blindness Neg Hx     Cancer Neg Hx     Glaucoma Neg Hx     Macular degeneration Neg Hx     Retinal detachment Neg Hx     Strabismus Neg Hx     Stroke Neg Hx     Thyroid disease Neg Hx      Social History   Substance Use Topics    Smoking status: Current Every Day Smoker     Packs/day: 1.50     Years: 25.00     Types: Cigarettes    Smokeless tobacco: Never Used    Alcohol use No     Review of Systems   Constitutional: Positive for fatigue. Negative for chills and fever.   HENT: Positive for ear pain, facial swelling, rhinorrhea, sinus pressure, sneezing and sore throat. Negative for congestion, ear discharge, postnasal drip and voice change.    Eyes: Negative for discharge and itching.   Respiratory: Positive for cough (productive) and wheezing. Negative for " shortness of breath.    Cardiovascular: Negative for chest pain, palpitations and leg swelling.   Gastrointestinal: Negative for abdominal pain, constipation, diarrhea, nausea and vomiting.   Endocrine: Negative for polydipsia, polyphagia and polyuria.   Genitourinary: Negative for dysuria, frequency, hematuria, urgency, vaginal bleeding, vaginal discharge and vaginal pain.   Musculoskeletal: Positive for neck pain. Negative for arthralgias and myalgias.   Skin: Negative for rash and wound.   Neurological: Negative for dizziness, seizures, syncope, weakness and numbness.   Hematological: Negative for adenopathy. Does not bruise/bleed easily.   Psychiatric/Behavioral: Negative for self-injury and suicidal ideas. The patient is not nervous/anxious.        Physical Exam     Initial Vitals [10/31/17 1817]   BP Pulse Resp Temp SpO2   139/69 94 20 97.8 °F (36.6 °C) 99 %      MAP       92.33         Physical Exam    Nursing note and vitals reviewed.  Constitutional: She appears well-developed and well-nourished.   HENT:   Head: Normocephalic and atraumatic.   Right Ear: Hearing, tympanic membrane, external ear and ear canal normal.   Left Ear: Hearing, tympanic membrane and external ear normal.   Nose: Nose normal.   Mouth/Throat: Oropharyngeal exudate and posterior oropharyngeal erythema present.   Canal is narrowed in left ear due to swelling. Unable to visualize the entire TM.   Eyes: Conjunctivae and EOM are normal. Pupils are equal, round, and reactive to light. Right eye exhibits no discharge. Left eye exhibits no discharge.   Neck: Normal range of motion.   Abdominal: She exhibits no distension.   Musculoskeletal: Normal range of motion.   Neurological: She is alert and oriented to person, place, and time.   Skin: Skin is dry. Capillary refill takes less than 2 seconds.         ED Course   Procedures  Labs Reviewed - No data to display          Medical Decision Making:   Centor score-cough is present, no cervical  lymphadenopathy, no fever, pos for erythema/exudate, age 47=0--no testing or antibiotics.                   ED Course as of Oct 31 2000   Tue Oct 31, 2017   1833 BP: 139/69 [VC]   1834 Temp: 97.8 °F (36.6 °C) [VC]   1834 Pulse: 94 [VC]   1834 Resp: 20 [VC]   1834 Vital signs normal.  Triage note reviewed.   SpO2: 99 % [VC]   1926 Initial assessment: 47-year-old female who presents for left ear and neck pain that is constant.  She was seen today and diagnosed with superlative otitis media with ruptured tympanic membrane, upper respiratory infection and treated with clarithromycin and ciprofloxacin drops.  Denies fever, chills  [VC]   1927 Differential diagnosis includes mastoiditis, otitis media with ruptured membranes, cellulitis, abscess, meningitis.  I do not suspect mastoiditis of the mastoids are firm and nontender, there is no redness or warmth to suggest cellulitis or abscess.  There is no meningismus.  [VC]   1928 Plan: Toradol 10 mg IM here, hold mobic for now.  Clinoril 150mg po bid starting tomorrow am.  Continue all other treatments.  [VC]   1928 Discussed plan with Dr. Duron who concurs with plan.  [VC]      ED Course User Index  [VC] Zain Campos DNP     Clinical Impression:   The primary encounter diagnosis was Otalgia of left ear. A diagnosis of URI with cough and congestion was also pertinent to this visit.    Disposition:   Disposition: Discharged  Condition: Stable                        Zain Campos DNP  10/31/17 2007

## 2017-10-31 NOTE — PROGRESS NOTES
"Subjective:       Patient ID: Audrey Natarajan is a 47 y.o. female.    Vitals:  height is 5' 6" (1.676 m) and weight is 95.7 kg (211 lb). Her oral temperature is 97.9 °F (36.6 °C). Her blood pressure is 110/66 and her pulse is 100. Her respiration is 12 and oxygen saturation is 96%.     Chief Complaint: URI    States her symptoms started Thursday. Has been taking OTC meds without relief. States something came out of her left ear the other day. Has also been using her inhalers. Cough is keeping her up at night.      URI    This is a new problem. The current episode started in the past 7 days. The problem has been gradually worsening. There has been no fever. Associated symptoms include congestion, coughing, ear pain, a plugged ear sensation, rhinorrhea, sinus pain, sneezing, a sore throat and wheezing. Pertinent negatives include no abdominal pain, chest pain, diarrhea, headaches, nausea, rash or vomiting. She has tried antihistamine, decongestant and inhaler use (Dayquil & Nyquil) for the symptoms. The treatment provided mild relief.     Review of Systems   Constitution: Positive for malaise/fatigue. Negative for chills and fever.   HENT: Positive for congestion, ear pain, hoarse voice, rhinorrhea, sinus pain, sneezing and sore throat.    Eyes: Negative for discharge and redness.   Cardiovascular: Negative for chest pain, dyspnea on exertion and leg swelling.   Respiratory: Positive for cough, sputum production and wheezing. Negative for shortness of breath.    Hematologic/Lymphatic: Negative for adenopathy.   Skin: Negative for rash.   Musculoskeletal: Negative for myalgias.   Gastrointestinal: Negative for abdominal pain, diarrhea, nausea and vomiting.   Neurological: Negative for headaches.   All other systems reviewed and are negative.      Objective:      Physical Exam   Constitutional: She is oriented to person, place, and time. Vital signs are normal. She appears well-developed and well-nourished.  " Non-toxic appearance. She does not have a sickly appearance. She does not appear ill.   Appears older than stated age   HENT:   Head: Normocephalic and atraumatic.   Right Ear: Hearing, tympanic membrane, external ear and ear canal normal.   Left Ear: Hearing, external ear and ear canal normal. There is drainage (purulent). Tympanic membrane is perforated.   Nose: Mucosal edema and rhinorrhea present. Right sinus exhibits no maxillary sinus tenderness and no frontal sinus tenderness. Left sinus exhibits no maxillary sinus tenderness and no frontal sinus tenderness.   Mouth/Throat: Uvula is midline and mucous membranes are normal. Oropharyngeal exudate and posterior oropharyngeal erythema present.   Eyes: Conjunctivae and EOM are normal. Pupils are equal, round, and reactive to light.   Neck: Normal range of motion. Neck supple.   Cardiovascular: Normal rate, regular rhythm and normal heart sounds.    Pulmonary/Chest: Effort normal and breath sounds normal. She has no wheezes.   Musculoskeletal: Normal range of motion.   Lymphadenopathy:     She has no cervical adenopathy.   Neurological: She is alert and oriented to person, place, and time.   Skin: Skin is warm and dry. Capillary refill takes less than 2 seconds. No rash noted.   Psychiatric: She has a normal mood and affect.   Nursing note and vitals reviewed.      Assessment:       1. Acute suppurative otitis media of left ear with spontaneous rupture of tympanic membrane, recurrence not specified    2. Upper respiratory tract infection, unspecified type        Plan:         Acute suppurative otitis media of left ear with spontaneous rupture of tympanic membrane, recurrence not specified  -     ciprofloxacin HCl 0.2 % otic solution; Place 1 vial (0.25 mLs total) into the left ear 2 (two) times daily.  Dispense: 20 vial; Refill: 0  -     clarithromycin (BIAXIN) 500 MG tablet; Take 1 tablet (500 mg total) by mouth every 12 (twelve) hours.  Dispense: 20 tablet;  Refill: 0    Upper respiratory tract infection, unspecified type  -     guaifenesin-codeine 100-10 mg/5 ml (CHERATUSSIN AC)  mg/5 mL syrup; Take 10 mLs by mouth every 6 (six) hours as needed for Cough (at night).  Dispense: 120 mL; Refill: 0  -     fluticasone (FLONASE) 50 mcg/actuation nasal spray; 1 spray by Each Nare route once daily.  Dispense: 1 Bottle; Refill: 0

## 2017-11-01 NOTE — DISCHARGE INSTRUCTIONS
Continue all treatments prescribed by urgent care.  Add clinoril for 5 days, starting tomorrow.  Do not take mobic while taking clinoril or tonight.

## 2017-11-02 ENCOUNTER — TELEPHONE (OUTPATIENT)
Dept: FAMILY MEDICINE | Facility: CLINIC | Age: 47
End: 2017-11-02

## 2017-11-02 ENCOUNTER — OFFICE VISIT (OUTPATIENT)
Dept: FAMILY MEDICINE | Facility: CLINIC | Age: 47
End: 2017-11-02
Payer: MEDICAID

## 2017-11-02 VITALS
DIASTOLIC BLOOD PRESSURE: 82 MMHG | HEART RATE: 83 BPM | SYSTOLIC BLOOD PRESSURE: 112 MMHG | HEIGHT: 66 IN | OXYGEN SATURATION: 97 % | TEMPERATURE: 98 F | RESPIRATION RATE: 17 BRPM | BODY MASS INDEX: 35.04 KG/M2 | WEIGHT: 218.06 LBS

## 2017-11-02 DIAGNOSIS — T14.8XXA BLISTER: Primary | ICD-10-CM

## 2017-11-02 DIAGNOSIS — R05.9 COUGH: ICD-10-CM

## 2017-11-02 PROCEDURE — 99214 OFFICE O/P EST MOD 30 MIN: CPT | Mod: S$PBB,,, | Performed by: NURSE PRACTITIONER

## 2017-11-02 PROCEDURE — 99999 PR PBB SHADOW E&M-EST. PATIENT-LVL III: CPT | Mod: PBBFAC,,, | Performed by: NURSE PRACTITIONER

## 2017-11-02 PROCEDURE — 99213 OFFICE O/P EST LOW 20 MIN: CPT | Mod: PBBFAC,PN | Performed by: NURSE PRACTITIONER

## 2017-11-02 RX ORDER — PROMETHAZINE HYDROCHLORIDE AND DEXTROMETHORPHAN HYDROBROMIDE 6.25; 15 MG/5ML; MG/5ML
10 SYRUP ORAL 4 TIMES DAILY PRN
Qty: 240 ML | Refills: 0 | Status: SHIPPED | OUTPATIENT
Start: 2017-11-02 | End: 2017-11-07

## 2017-11-02 RX ORDER — MUPIROCIN 20 MG/G
OINTMENT TOPICAL
Qty: 15 G | Refills: 0 | Status: SHIPPED | OUTPATIENT
Start: 2017-11-02 | End: 2018-04-27 | Stop reason: ALTCHOICE

## 2017-11-02 NOTE — TELEPHONE ENCOUNTER
----- Message from Yessica Stallings sent at 11/2/2017  7:59 AM CDT -----  Contact: self  Patient called stating that she is a diabetic and have sore between her toes. She would like to know if she can put bactrim ointment on it. Please contact her at [phone]. Also, she was trying to see Dr per follow up from Urgent Care, nothing was available until 11/15 with Dr Moeller.    Thanks!

## 2017-11-02 NOTE — PROGRESS NOTES
Subjective:       Patient ID: Audrey Natarajan is a 47 y.o. female.    Chief Complaint: Blister (left foot - on 2nd digit  ); Otalgia; Medication Management (discuss Guaifensin ); and Orthotic Consult (referral)    HPI Ms Natarajan is here for a blister to her L foot, between her big toe.  She reports that she noticed it yesterday.  She does not believe she injured it in any way, but she does wear flip flops a lot.  No treatment attempted.  Also, she was seen 2 days ago in urgent care AND the ER for URI, she was given cheratussin which makes her itch.  She would like the cough medication she's had in the past without codeine.  Review of Systems   Constitutional: Negative for fever.   Respiratory: Negative.    Cardiovascular: Negative.    Skin: Positive for wound.       Objective:      Physical Exam   Constitutional: She is oriented to person, place, and time. She appears well-developed and well-nourished. She does not appear ill. No distress.   obese   HENT:   Head: Normocephalic and atraumatic.   Cardiovascular: Normal rate.    Pulses:       Dorsalis pedis pulses are 2+ on the left side.        Posterior tibial pulses are 2+ on the left side.   Pulmonary/Chest: Effort normal.   Musculoskeletal:        Feet:    Neurological: She is alert and oriented to person, place, and time.   Skin: Skin is warm and dry.   Psychiatric: She has a normal mood and affect. Her behavior is normal.   Vitals reviewed.      Assessment:       1. Blister    2. Cough        Plan:       Blister  -     mupirocin (BACTROBAN) 2 % ointment; APPLY TOPICALLY BID  Dispense: 15 g; Refill: 0  Minor, instructed to keep clean and dry, apply bactroban    Cough  -     promethazine-dextromethorphan (PROMETHAZINE-DM) 6.25-15 mg/5 mL Syrp; Take 10 mLs by mouth 4 (four) times daily as needed.  Dispense: 240 mL; Refill: 0  D/c cheratussin    F/u prn

## 2017-11-02 NOTE — PATIENT INSTRUCTIONS
Follow up with primary care provider if not improved  Go to ER for new, worse or concerning symptoms    First Aid: Cuts and Scrapes  A break in the skin is an open door, inviting dirt and germs to enter your body and cause infection.      Step 1. Control bleeding  · Apply direct pressure for at least 5 minutes.  Step 2. Clean and cover  · Wash the scrape or cut with soap and water to kill germs and remove dirt and foreign objects.  · Apply a topical antibiotic to minor cuts and scrapes that do not need medical attention (see below).  · Cover the wound with a clean gauze dressing to reduce the risk of infection and further injury. Keep the dressing in place with a gauze or cloth bandage.  · Don't tie or tape the bandage too tight.  When to call your healthcare provider  Seek medical help if any of the following is true:  · The wound covers a large area or is deep.  · The injury is on the face or any other area where scarring is a concern.  · The person needs protection against tetanus. This is a disease caused by bacteria that may enter any break in the skin and bring on a life-threatening illness called lockjaw. A tetanus booster (injection) may be needed if it's been more than 5 years since the last tetanus vaccination.   Call 911  Call 911 immediately if the victim has any of the following  · Uncontrollable bleeding  · Shock symptoms:  ¨ The skin is pale or clammy  ¨ The pulse is so light or races so fast that you can't count the beats.  ¨ The victim is confused or unable to concentrate, or stares blankly. Over time, the victim may even become unconscious.  · A detached body part: Keep all fragments of the detached part dry. Put them in a plastic bag or other container first, and then put bag or container in ice or cold water to improve chances for reattachment. Send the parts to the hospital along with the victim.  While you wait for help  · Reassure the person.  · Continue to control bleeding with direct  pressure.   Date Last Reviewed: 12/1/2016  © 6783-7103 [employer]. [address] [place] [address], [place] [address]. All rights reserved. This information is not intended as a substitute for professional medical care. Always follow your healthcare professional's instructions.

## 2017-11-06 ENCOUNTER — LAB VISIT (OUTPATIENT)
Dept: LAB | Facility: HOSPITAL | Age: 47
End: 2017-11-06
Attending: INTERNAL MEDICINE
Payer: MEDICAID

## 2017-11-06 DIAGNOSIS — E11.9 TYPE 2 DIABETES MELLITUS WITHOUT COMPLICATION, WITHOUT LONG-TERM CURRENT USE OF INSULIN: ICD-10-CM

## 2017-11-06 DIAGNOSIS — I10 ESSENTIAL HYPERTENSION: Chronic | ICD-10-CM

## 2017-11-06 DIAGNOSIS — D72.829 LEUKOCYTOSIS, UNSPECIFIED TYPE: ICD-10-CM

## 2017-11-06 DIAGNOSIS — E78.1 HYPERTRIGLYCERIDEMIA: Chronic | ICD-10-CM

## 2017-11-06 LAB
ALBUMIN SERPL BCP-MCNC: 3.1 G/DL
ALP SERPL-CCNC: 83 U/L
ALT SERPL W/O P-5'-P-CCNC: 11 U/L
ANION GAP SERPL CALC-SCNC: 11 MMOL/L
AST SERPL-CCNC: 10 U/L
BASOPHILS # BLD AUTO: 0.06 K/UL
BASOPHILS NFR BLD: 0.4 %
BILIRUB SERPL-MCNC: 0.3 MG/DL
BUN SERPL-MCNC: 10 MG/DL
CALCIUM SERPL-MCNC: 9.1 MG/DL
CHLORIDE SERPL-SCNC: 102 MMOL/L
CHOLEST SERPL-MCNC: 199 MG/DL
CHOLEST/HDLC SERPL: 4.4 {RATIO}
CO2 SERPL-SCNC: 28 MMOL/L
CREAT SERPL-MCNC: 0.6 MG/DL
CREAT UR-MCNC: 289 MG/DL
DIFFERENTIAL METHOD: ABNORMAL
EOSINOPHIL # BLD AUTO: 0.5 K/UL
EOSINOPHIL NFR BLD: 2.8 %
ERYTHROCYTE [DISTWIDTH] IN BLOOD BY AUTOMATED COUNT: 14 %
EST. GFR  (AFRICAN AMERICAN): >60 ML/MIN/1.73 M^2
EST. GFR  (NON AFRICAN AMERICAN): >60 ML/MIN/1.73 M^2
ESTIMATED AVG GLUCOSE: 128 MG/DL
GLUCOSE SERPL-MCNC: 94 MG/DL
HBA1C MFR BLD HPLC: 6.1 %
HCT VFR BLD AUTO: 38.5 %
HDLC SERPL-MCNC: 45 MG/DL
HDLC SERPL: 22.6 %
HGB BLD-MCNC: 12.9 G/DL
LDLC SERPL CALC-MCNC: 129 MG/DL
LYMPHOCYTES # BLD AUTO: 5.5 K/UL
LYMPHOCYTES NFR BLD: 32.8 %
MCH RBC QN AUTO: 30.4 PG
MCHC RBC AUTO-ENTMCNC: 33.5 G/DL
MCV RBC AUTO: 91 FL
MICROALBUMIN UR DL<=1MG/L-MCNC: 56 UG/ML
MICROALBUMIN/CREATININE RATIO: 19.4 UG/MG
MONOCYTES # BLD AUTO: 1.5 K/UL
MONOCYTES NFR BLD: 8.9 %
NEUTROPHILS # BLD AUTO: 9.2 K/UL
NEUTROPHILS NFR BLD: 55.1 %
NONHDLC SERPL-MCNC: 154 MG/DL
PLATELET # BLD AUTO: 393 K/UL
PMV BLD AUTO: 10 FL
POTASSIUM SERPL-SCNC: 4.1 MMOL/L
PROT SERPL-MCNC: 6.5 G/DL
RBC # BLD AUTO: 4.24 M/UL
SODIUM SERPL-SCNC: 141 MMOL/L
TRIGL SERPL-MCNC: 125 MG/DL
WBC # BLD AUTO: 16.67 K/UL

## 2017-11-06 PROCEDURE — 80053 COMPREHEN METABOLIC PANEL: CPT

## 2017-11-06 PROCEDURE — 83036 HEMOGLOBIN GLYCOSYLATED A1C: CPT

## 2017-11-06 PROCEDURE — 82570 ASSAY OF URINE CREATININE: CPT

## 2017-11-06 PROCEDURE — 36415 COLL VENOUS BLD VENIPUNCTURE: CPT

## 2017-11-06 PROCEDURE — 80061 LIPID PANEL: CPT

## 2017-11-06 PROCEDURE — 85025 COMPLETE CBC W/AUTO DIFF WBC: CPT

## 2017-11-07 ENCOUNTER — TELEPHONE (OUTPATIENT)
Dept: FAMILY MEDICINE | Facility: CLINIC | Age: 47
End: 2017-11-07

## 2017-11-07 DIAGNOSIS — J06.9 UPPER RESPIRATORY TRACT INFECTION, UNSPECIFIED TYPE: Primary | ICD-10-CM

## 2017-11-07 RX ORDER — CODEINE PHOSPHATE AND GUAIFENESIN 10; 100 MG/5ML; MG/5ML
5 SOLUTION ORAL 3 TIMES DAILY PRN
Refills: 0 | Status: CANCELLED | OUTPATIENT
Start: 2017-11-07 | End: 2017-11-17

## 2017-11-07 RX ORDER — CODEINE PHOSPHATE AND GUAIFENESIN 10; 100 MG/5ML; MG/5ML
5 SOLUTION ORAL EVERY 6 HOURS PRN
Qty: 120 ML | Refills: 0 | Status: SHIPPED | OUTPATIENT
Start: 2017-11-07 | End: 2017-12-02

## 2017-11-07 NOTE — TELEPHONE ENCOUNTER
Patient states that it's that it was a week ago, it was a little bottle and it's all gone. Please advise.

## 2017-11-07 NOTE — TELEPHONE ENCOUNTER
----- Message from Keyona Howard sent at 11/7/2017 10:08 AM CST -----  Contact: self  Pt calling to discuss medications. Please call [phone]

## 2017-11-07 NOTE — TELEPHONE ENCOUNTER
Pt. States her insurance does not cover Promethazine cough syrup called in recently. Pt. Would like to go back to Cheratussin  because it only cost her $2. Pt. States she knows she is allergic to codeine and she does have itching with this medication but the Urgent care doctor told her its not an allergic reaction its just a side effect.

## 2017-11-09 ENCOUNTER — HOSPITAL ENCOUNTER (OUTPATIENT)
Dept: RADIOLOGY | Facility: HOSPITAL | Age: 47
Discharge: HOME | End: 2017-11-09
Attending: INTERNAL MEDICINE
Payer: MEDICAID

## 2017-11-09 VITALS — BODY MASS INDEX: 35.03 KG/M2 | WEIGHT: 218 LBS | HEIGHT: 66 IN

## 2017-11-09 DIAGNOSIS — Z12.31 ENCOUNTER FOR SCREENING MAMMOGRAM FOR BREAST CANCER: ICD-10-CM

## 2017-11-09 PROCEDURE — 77067 SCR MAMMO BI INCL CAD: CPT | Mod: 26,,, | Performed by: RADIOLOGY

## 2017-11-09 PROCEDURE — 77067 SCR MAMMO BI INCL CAD: CPT | Mod: TC

## 2017-11-15 ENCOUNTER — TELEPHONE (OUTPATIENT)
Dept: FAMILY MEDICINE | Facility: CLINIC | Age: 47
End: 2017-11-15

## 2017-11-15 ENCOUNTER — HOSPITAL ENCOUNTER (EMERGENCY)
Facility: HOSPITAL | Age: 47
Discharge: HOME | End: 2017-11-15
Attending: EMERGENCY MEDICINE
Payer: MEDICAID

## 2017-11-15 VITALS
TEMPERATURE: 98 F | RESPIRATION RATE: 20 BRPM | DIASTOLIC BLOOD PRESSURE: 60 MMHG | SYSTOLIC BLOOD PRESSURE: 130 MMHG | HEART RATE: 70 BPM | BODY MASS INDEX: 34.23 KG/M2 | WEIGHT: 213 LBS | OXYGEN SATURATION: 95 % | HEIGHT: 66 IN

## 2017-11-15 DIAGNOSIS — S39.012A LUMBAR STRAIN, INITIAL ENCOUNTER: ICD-10-CM

## 2017-11-15 DIAGNOSIS — S16.1XXA CERVICAL MUSCLE STRAIN, INITIAL ENCOUNTER: Primary | ICD-10-CM
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
## 2017-11-15 LAB
B-HCG UR QL: NEGATIVE
CTP QC/QA: YES

## 2017-11-15 PROCEDURE — 81025 URINE PREGNANCY TEST: CPT | Performed by: PHYSICIAN ASSISTANT

## 2017-11-15 PROCEDURE — 63600175 PHARM REV CODE 636 W HCPCS: Performed by: PHYSICIAN ASSISTANT

## 2017-11-15 PROCEDURE — 96372 THER/PROPH/DIAG INJ SC/IM: CPT

## 2017-11-15 PROCEDURE — 99284 EMERGENCY DEPT VISIT MOD MDM: CPT | Mod: 25

## 2017-11-15 RX ORDER — ORPHENADRINE CITRATE 30 MG/ML
60 INJECTION INTRAMUSCULAR; INTRAVENOUS
Status: COMPLETED | OUTPATIENT
Start: 2017-11-15 | End: 2017-11-15

## 2017-11-15 RX ORDER — CYCLOBENZAPRINE HCL 10 MG
10 TABLET ORAL
Status: DISCONTINUED | OUTPATIENT
Start: 2017-11-15 | End: 2017-11-15

## 2017-11-15 RX ORDER — KETOROLAC TROMETHAMINE 30 MG/ML
10 INJECTION, SOLUTION INTRAMUSCULAR; INTRAVENOUS
Status: DISCONTINUED | OUTPATIENT
Start: 2017-11-15 | End: 2017-11-15

## 2017-11-15 RX ORDER — KETOROLAC TROMETHAMINE 30 MG/ML
10 INJECTION, SOLUTION INTRAMUSCULAR; INTRAVENOUS
Status: COMPLETED | OUTPATIENT
Start: 2017-11-15 | End: 2017-11-15

## 2017-11-15 RX ORDER — CYCLOBENZAPRINE HCL 10 MG
10 TABLET ORAL 3 TIMES DAILY PRN
Qty: 15 TABLET | Refills: 0 | Status: SHIPPED | OUTPATIENT
Start: 2017-11-15 | End: 2017-11-19 | Stop reason: ALTCHOICE

## 2017-11-15 RX ADMIN — ORPHENADRINE CITRATE 60 MG: 30 INJECTION INTRAMUSCULAR; INTRAVENOUS at 09:11

## 2017-11-15 RX ADMIN — KETOROLAC TROMETHAMINE 10 MG: 30 INJECTION, SOLUTION INTRAMUSCULAR at 09:11

## 2017-11-15 NOTE — TELEPHONE ENCOUNTER
The pt. Is taking otc  Ibuprofen for pain at this time. She is coming in for an appointment at 11 tomorrow.

## 2017-11-15 NOTE — TELEPHONE ENCOUNTER
Patient states that she has had a couple of falls re injuring her right hip, leg ankle 8/10 pain and is requesting a stronger medication. Please advise.

## 2017-11-15 NOTE — TELEPHONE ENCOUNTER
----- Message from Risa Li sent at 11/15/2017 12:52 PM CST -----  Contact: Self  Pt requesting a stronger medication for her left hip and leg pain. Please call [phone].

## 2017-11-16 ENCOUNTER — HOSPITAL ENCOUNTER (EMERGENCY)
Facility: HOSPITAL | Age: 47
Discharge: HOME | End: 2017-11-16
Attending: EMERGENCY MEDICINE
Payer: MEDICAID

## 2017-11-16 ENCOUNTER — TELEPHONE (OUTPATIENT)
Dept: FAMILY MEDICINE | Facility: CLINIC | Age: 47
End: 2017-11-16

## 2017-11-16 VITALS
OXYGEN SATURATION: 99 % | RESPIRATION RATE: 20 BRPM | WEIGHT: 213 LBS | SYSTOLIC BLOOD PRESSURE: 126 MMHG | BODY MASS INDEX: 34.23 KG/M2 | DIASTOLIC BLOOD PRESSURE: 81 MMHG | TEMPERATURE: 99 F | HEIGHT: 66 IN | HEART RATE: 79 BPM

## 2017-11-16 DIAGNOSIS — S20.212A RIB CONTUSION, LEFT, INITIAL ENCOUNTER: ICD-10-CM
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
## 2017-11-16 DIAGNOSIS — M25.562 ACUTE PAIN OF BOTH KNEES: ICD-10-CM

## 2017-11-16 DIAGNOSIS — R09.89 ABNORMAL LUNG SOUNDS: ICD-10-CM

## 2017-11-16 DIAGNOSIS — M25.561 ACUTE PAIN OF BOTH KNEES: ICD-10-CM

## 2017-11-16 LAB — POCT GLUCOSE: 88 MG/DL (ref 70–110)

## 2017-11-16 PROCEDURE — 96372 THER/PROPH/DIAG INJ SC/IM: CPT

## 2017-11-16 PROCEDURE — 99284 EMERGENCY DEPT VISIT MOD MDM: CPT | Mod: 25

## 2017-11-16 PROCEDURE — 82962 GLUCOSE BLOOD TEST: CPT

## 2017-11-16 PROCEDURE — 63600175 PHARM REV CODE 636 W HCPCS: Performed by: NURSE PRACTITIONER

## 2017-11-16 RX ORDER — ORPHENADRINE CITRATE 30 MG/ML
30 INJECTION INTRAMUSCULAR; INTRAVENOUS
Status: COMPLETED | OUTPATIENT
Start: 2017-11-16 | End: 2017-11-16

## 2017-11-16 RX ORDER — KETOROLAC TROMETHAMINE 30 MG/ML
10 INJECTION, SOLUTION INTRAMUSCULAR; INTRAVENOUS
Status: COMPLETED | OUTPATIENT
Start: 2017-11-16 | End: 2017-11-16

## 2017-11-16 RX ADMIN — KETOROLAC TROMETHAMINE 10 MG: 30 INJECTION, SOLUTION INTRAMUSCULAR at 12:11

## 2017-11-16 RX ADMIN — ORPHENADRINE CITRATE 30 MG: 30 INJECTION INTRAMUSCULAR; INTRAVENOUS at 12:11
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
## 2017-11-16 NOTE — TELEPHONE ENCOUNTER
----- Message from Risa Li sent at 11/15/2017  1:42 PM CST -----  Contact: self  Pt returning call. Please call [phone].
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
## 2017-11-16 NOTE — ED NOTES
Patient provided charity crackers and apple juice at this time , provider was in agreement with feeding patient .

## 2017-11-17 ENCOUNTER — TELEPHONE (OUTPATIENT)
Dept: FAMILY MEDICINE | Facility: CLINIC | Age: 47
End: 2017-11-17

## 2017-11-17 DIAGNOSIS — M54.32 LEFT SIDED SCIATICA: ICD-10-CM

## 2017-11-17 DIAGNOSIS — R29.6 FREQUENT FALLS: Primary | ICD-10-CM

## 2017-11-17 RX ORDER — GABAPENTIN 600 MG/1
TABLET ORAL
Qty: 180 TABLET | Refills: 1 | Status: SHIPPED | OUTPATIENT
Start: 2017-11-17 | End: 2018-01-16 | Stop reason: SDUPTHER

## 2017-11-17 NOTE — TELEPHONE ENCOUNTER
Patient is requesting something for pain. Patient states that she had a fall and her current medication is not working. Please advise.
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
## 2017-11-17 NOTE — TELEPHONE ENCOUNTER
She should go to the ER for frequent falls.  I've also referred her to neuro to have this reevaluated.

## 2017-11-17 NOTE — TELEPHONE ENCOUNTER
Informed pt she should go to the ER if she is still falling also a referral was put in to see a neurologist. Informed pt someone will call to schedule her an appt. Pt states she wanted pain meds she has been taking ibuprofen and flexeril and it is not working, She stated she has an appt. With Dr.Van Mae today for the pain in her neck and back pain. She states she will go to Urgent care if she falls and not the ED because all she gets in the ED is a shot for pain and no pain meds.

## 2017-11-19 ENCOUNTER — HOSPITAL ENCOUNTER (EMERGENCY)
Facility: HOSPITAL | Age: 47
Discharge: HOME | End: 2017-11-19
Attending: EMERGENCY MEDICINE
Payer: MEDICAID

## 2017-11-19 VITALS
RESPIRATION RATE: 18 BRPM | HEART RATE: 92 BPM | SYSTOLIC BLOOD PRESSURE: 150 MMHG | DIASTOLIC BLOOD PRESSURE: 76 MMHG | TEMPERATURE: 98 F | WEIGHT: 213 LBS | HEIGHT: 66 IN | OXYGEN SATURATION: 96 % | BODY MASS INDEX: 34.23 KG/M2

## 2017-11-19 DIAGNOSIS — R07.81 RIB PAIN ON LEFT SIDE: Primary | ICD-10-CM

## 2017-11-19 DIAGNOSIS — T14.90XA TRAUMA: ICD-10-CM

## 2017-11-19 LAB
AMPHET+METHAMPHET UR QL: NEGATIVE
B-HCG UR QL: NEGATIVE
BARBITURATES UR QL SCN>200 NG/ML: NEGATIVE
BENZODIAZ UR QL SCN>200 NG/ML: NEGATIVE
BZE UR QL SCN: NEGATIVE
CANNABINOIDS UR QL SCN: NEGATIVE
CREAT UR-MCNC: 26.7 MG/DL
CTP QC/QA: YES
METHADONE UR QL SCN>300 NG/ML: NEGATIVE
OPIATES UR QL SCN: NEGATIVE
PCP UR QL SCN>25 NG/ML: NEGATIVE
TOXICOLOGY INFORMATION: NORMAL

## 2017-11-19 PROCEDURE — 25000003 PHARM REV CODE 250: Performed by: EMERGENCY MEDICINE

## 2017-11-19 PROCEDURE — 81025 URINE PREGNANCY TEST: CPT | Performed by: EMERGENCY MEDICINE

## 2017-11-19 PROCEDURE — 99284 EMERGENCY DEPT VISIT MOD MDM: CPT | Mod: 25

## 2017-11-19 PROCEDURE — 93010 ELECTROCARDIOGRAM REPORT: CPT | Mod: ,,, | Performed by: INTERNAL MEDICINE

## 2017-11-19 PROCEDURE — 80307 DRUG TEST PRSMV CHEM ANLYZR: CPT

## 2017-11-19 RX ORDER — KETOROLAC TROMETHAMINE 10 MG/1
10 TABLET, FILM COATED ORAL EVERY 12 HOURS PRN
Qty: 6 TABLET | Refills: 0 | Status: SHIPPED | OUTPATIENT
Start: 2017-11-19 | End: 2017-11-22

## 2017-11-19 RX ORDER — LIDOCAINE 50 MG/G
1 PATCH TOPICAL
Status: DISCONTINUED | OUTPATIENT
Start: 2017-11-19 | End: 2017-11-19 | Stop reason: HOSPADM

## 2017-11-19 RX ORDER — LIDOCAINE 50 MG/G
1 PATCH TOPICAL DAILY
Qty: 15 PATCH | Refills: 0 | Status: SHIPPED | OUTPATIENT
Start: 2017-11-19 | End: 2017-11-24

## 2017-11-19 RX ADMIN — LIDOCAINE 1 PATCH: 50 PATCH TOPICAL at 07:11
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
## 2017-11-19 NOTE — ED TRIAGE NOTES
Patient states that she had a fall on Thursday and that she was sen at this facility where she received flexeril and ibuprofen. Patient states that she has not slept since the fall happened. Patient states that it hurts to breathe. Pt states that she has pain in her left rib area that radiates to her back. Pain 10/10. Patient states that she had a respiratory infection and cannot cough at this time because of the pain.

## 2017-11-20 DIAGNOSIS — I15.2 HYPERTENSION ASSOCIATED WITH DIABETES: ICD-10-CM

## 2017-11-20 DIAGNOSIS — E11.59 HYPERTENSION ASSOCIATED WITH DIABETES: ICD-10-CM

## 2017-11-20 RX ORDER — FUROSEMIDE 20 MG/1
TABLET ORAL
Qty: 30 TABLET | Refills: 1 | Status: SHIPPED | OUTPATIENT
Start: 2017-11-20 | End: 2018-01-16 | Stop reason: SDUPTHER

## 2017-11-27 ENCOUNTER — TELEPHONE (OUTPATIENT)
Dept: FAMILY MEDICINE | Facility: CLINIC | Age: 47
End: 2017-11-27

## 2017-11-27 NOTE — TELEPHONE ENCOUNTER
I spoke with the patient regarding the referral to Neurology. I advised the patient that she will be put on the wait list for the next available appointment.

## 2017-12-02 ENCOUNTER — OFFICE VISIT (OUTPATIENT)
Dept: URGENT CARE | Facility: CLINIC | Age: 47
End: 2017-12-02
Payer: MEDICAID

## 2017-12-02 VITALS
HEART RATE: 79 BPM | BODY MASS INDEX: 34.87 KG/M2 | OXYGEN SATURATION: 97 % | TEMPERATURE: 98 F | SYSTOLIC BLOOD PRESSURE: 114 MMHG | DIASTOLIC BLOOD PRESSURE: 78 MMHG | HEIGHT: 66 IN | RESPIRATION RATE: 18 BRPM | WEIGHT: 217 LBS

## 2017-12-02 DIAGNOSIS — J32.9 SINUSITIS, UNSPECIFIED CHRONICITY, UNSPECIFIED LOCATION: Primary | ICD-10-CM

## 2017-12-02 PROCEDURE — 99214 OFFICE O/P EST MOD 30 MIN: CPT | Mod: S$GLB,,, | Performed by: NURSE PRACTITIONER

## 2017-12-02 RX ORDER — AZITHROMYCIN 250 MG/1
TABLET, FILM COATED ORAL
Qty: 6 TABLET | Refills: 0 | Status: SHIPPED | OUTPATIENT
Start: 2017-12-02 | End: 2018-01-15

## 2017-12-02 RX ORDER — PROMETHAZINE HYDROCHLORIDE AND DEXTROMETHORPHAN HYDROBROMIDE 6.25; 15 MG/5ML; MG/5ML
5 SYRUP ORAL
Qty: 118 ML | Refills: 0 | Status: SHIPPED | OUTPATIENT
Start: 2017-12-02 | End: 2017-12-22 | Stop reason: SDUPTHER

## 2017-12-02 NOTE — PROGRESS NOTES
"Subjective:       Patient ID: Audrey Natarajan is a 47 y.o. female.    Vitals:  height is 5' 6" (1.676 m) and weight is 98.4 kg (217 lb). Her oral temperature is 98.3 °F (36.8 °C). Her blood pressure is 114/78 and her pulse is 79. Her respiration is 18 and oxygen saturation is 97%.     Chief Complaint: Nasal Congestion    Other   This is a new problem. The current episode started 1 to 4 weeks ago. The problem occurs constantly. The problem has been gradually worsening. Associated symptoms include congestion, coughing and headaches. Pertinent negatives include no abdominal pain, chest pain, chills, fever, myalgias, nausea or sore throat. The symptoms are aggravated by coughing. Treatments tried: rx cough syrup, nyquil, dayquil, benadryl. The treatment provided mild relief.     Review of Systems   Constitution: Positive for malaise/fatigue. Negative for chills and fever.   HENT: Positive for congestion and ear pain (left ear). Negative for hoarse voice and sore throat.    Eyes: Negative for discharge and redness.   Cardiovascular: Negative for chest pain, dyspnea on exertion and leg swelling.   Respiratory: Positive for cough and sputum production. Negative for shortness of breath and wheezing.    Musculoskeletal: Negative for myalgias.   Gastrointestinal: Negative for abdominal pain and nausea.   Neurological: Positive for headaches.       Objective:      Physical Exam   Constitutional: She is oriented to person, place, and time. She appears well-developed and well-nourished. She is cooperative.  Non-toxic appearance. She does not appear ill. No distress.   HENT:   Head: Normocephalic and atraumatic.   Right Ear: Hearing, tympanic membrane, external ear and ear canal normal.   Left Ear: Hearing, tympanic membrane, external ear and ear canal normal.   Nose: Mucosal edema present. No rhinorrhea or nasal deformity. No epistaxis. Right sinus exhibits maxillary sinus tenderness. Right sinus exhibits no frontal sinus " tenderness. Left sinus exhibits maxillary sinus tenderness. Left sinus exhibits no frontal sinus tenderness.   Mouth/Throat: Uvula is midline and mucous membranes are normal. No trismus in the jaw. Normal dentition. No uvula swelling. Posterior oropharyngeal erythema present.   Eyes: Conjunctivae and lids are normal. No scleral icterus.   Sclera clear bilat   Neck: Trachea normal, full passive range of motion without pain and phonation normal. Neck supple.   Cardiovascular: Normal rate, regular rhythm, normal heart sounds, intact distal pulses and normal pulses.    Pulmonary/Chest: Effort normal and breath sounds normal. No respiratory distress.   Abdominal: Soft. Normal appearance and bowel sounds are normal. She exhibits no distension. There is no tenderness.   Musculoskeletal: Normal range of motion. She exhibits no edema or deformity.   Neurological: She is alert and oriented to person, place, and time. She exhibits normal muscle tone. Coordination normal.   Skin: Skin is warm, dry and intact. She is not diaphoretic. No pallor.   Psychiatric: She has a normal mood and affect. Her speech is normal and behavior is normal. Judgment and thought content normal. Cognition and memory are normal.   Nursing note and vitals reviewed.      Assessment:       1. Sinusitis, unspecified chronicity, unspecified location        Plan:         Sinusitis, unspecified chronicity, unspecified location  -     azithromycin (ZITHROMAX Z-TAMEKA) 250 MG tablet; TAKE 2 TABLETS ON DAY 1, THEN 1 TABLET DAILY X 4 DAYS.  Dispense: 6 tablet; Refill: 0  -     promethazine-dextromethorphan (PROMETHAZINE-DM) 6.25-15 mg/5 mL Syrp; Take 5 mLs by mouth every 4 to 6 hours as needed.  Dispense: 118 mL; Refill: 0    No Patient Care Coordination Note on file.

## 2017-12-02 NOTE — PATIENT INSTRUCTIONS
Please drink plenty of fluids.  Please get plenty of rest.  Please return here or go to the Emergency Department for any concerns or worsening of condition.  If you were given wait & see antibiotics, please wait 3-5 days before taking them, and only take them if your symptoms have worsened or not improved.  If you do begin taking the antibiotics, please take them to completion.  If you were prescribed antibiotics, please take them to completion.  If you were prescribed a narcotic medication, do not drive or operate heavy equipment or machinery while taking these medications.  If you do not have Hypertension or any history of palpitations, it is ok to take over the counter Sudafed or Mucinex D or Allegra-D or Claritin-D or Zyrtec-D.  If you do take one of the above, it is ok to combine that with plain over the counter Mucinex or Allegra or Claritin or Zyrtec.  If for example you are taking Zyrtec -D, you can combine that with Mucinex, but not Mucinex-D.  If you are taking Mucinex-D, you can combine that with plain Allegra or Claritin or Zyrtec.   If you do have Hypertension or palpitations, it is safe to take Coricidin HBP for relief of sinus symptoms.  We recommend you take over the counter Flonase (Fluticasone) or another nasally inhaled steroid unless you are already taking one.  Nasal irrigation with a saline spray or Netti Pot like device per their directions is also recommended.  If not allergic, please take over the counter Tylenol (Acetaminophen) and/or Motrin (Ibuprofen) as directed for control of pain and/or fever.  Please follow up with your primary care doctor or specialist as needed.    If you  smoke, please stop smoking.  Understanding Your Sinuses  Your sinuses are air-filled spaces between the bones in your head. They have small openings that connect to the nasal cavity. The sinuses make mucus that drains into the nose. This helps keep the nose moist and free of dust and germs.      Parts of the nasal  cavity  · The septum is the wall of cartilage and bone in the center of the nasal cavity.  · The middle meatus is the intersection between the sinuses.  · Turbinates are ridges on the sides of the nasal cavity.  Cilia keep sinuses clear    Air circulates freely though healthy sinuses. Tiny, hairlike structures called cilia line the sinuses. Cilia move the thin, watery mucus through the sinuses and into the nose. Sinuses are healthy when they drain freely. Sinus drainage can be blocked if the sinus lining is swollen or if mucus is too thick. Cilia that are damaged or dont work correctly can also lead to problems with drainage.  Date Last Reviewed: 10/1/2016  © 2738-5886 [employer]. [address] [place] [address], [place] [address]. All rights reserved. This information is not intended as a substitute for professional medical care. Always follow your healthcare professional's instructions.

## 2017-12-04 DIAGNOSIS — K21.9 GASTROESOPHAGEAL REFLUX DISEASE WITHOUT ESOPHAGITIS: ICD-10-CM

## 2017-12-04 RX ORDER — PHENOL/SODIUM PHENOLATE
AEROSOL, SPRAY (ML) MUCOUS MEMBRANE
Qty: 90 EACH | Refills: 1 | Status: SHIPPED | OUTPATIENT
Start: 2017-12-04 | End: 2018-08-21 | Stop reason: SDUPTHER

## 2017-12-05 ENCOUNTER — TELEPHONE (OUTPATIENT)
Dept: URGENT CARE | Facility: CLINIC | Age: 47
End: 2017-12-05

## 2017-12-06 DIAGNOSIS — I15.2 HYPERTENSION ASSOCIATED WITH DIABETES: ICD-10-CM

## 2017-12-06 DIAGNOSIS — E11.59 HYPERTENSION ASSOCIATED WITH DIABETES: ICD-10-CM

## 2017-12-07 RX ORDER — LISINOPRIL 5 MG/1
TABLET ORAL
Qty: 30 TABLET | Refills: 2 | Status: SHIPPED | OUTPATIENT
Start: 2017-12-07 | End: 2018-04-05 | Stop reason: SDUPTHER

## 2017-12-09 ENCOUNTER — NURSE TRIAGE (OUTPATIENT)
Dept: ADMINISTRATIVE | Facility: CLINIC | Age: 47
End: 2017-12-09

## 2017-12-09 NOTE — TELEPHONE ENCOUNTER
"    Reason for Disposition   Diabetes (Exception: small cut or scrape)    Answer Assessment - Initial Assessment Questions  1. MECHANISM: "How did the injury happen?" (e.g., twisting injury, direct blow)       I ran into a trunk in my room coming back form the bathroom     2. ONSET: "When did the injury happen?" (Minutes or hours ago)       Last Wednesday     3. LOCATION: "Where is the injury located?"       Left foot     4. APPEARANCE of INJURY: "What does the injury look like?"       Purple / swelling and throbbing     5. WEIGHT-BEARING: "Can you put weight on that foot?" "Can you walk (four steps or more)?"        I ca    6. SIZE: For cuts, bruises, or swelling, ask: "How large is it?" (e.g., inches or centimeters;  entire joint)       Swelling on top part of my foot    7. PAIN: "Is there pain?" If so, ask: "How bad is the pain?"    (e.g., Scale 1-10; or mild, moderate, severe)     9/10    8. TETANUS: For any breaks in the skin, ask: "When was the last tetanus booster?"      *No Answer*  9. OTHER SYMPTOMS: "Do you have any other symptoms?"       Denies numbness and tingling     10. PREGNANCY: "Is there any chance you are pregnant?" "When was your last menstrual period?"        *No Answer*    Protocols used:  FOOT AND ANKLE INJURY-A-    Patient reports that she hip her foot on lest Wednesday and reports bruising, swelling to left foot and toes. Denies  numbness and tingling. Education completed per [facility] On Call Care Advice including  Home care, when to report to ER, and to follow up with PCP within 24 hours. Patient verbalize understanding.  "

## 2017-12-22 ENCOUNTER — OFFICE VISIT (OUTPATIENT)
Dept: FAMILY MEDICINE | Facility: CLINIC | Age: 47
End: 2017-12-22
Payer: MEDICAID

## 2017-12-22 VITALS
OXYGEN SATURATION: 95 % | HEART RATE: 82 BPM | DIASTOLIC BLOOD PRESSURE: 80 MMHG | HEIGHT: 66 IN | SYSTOLIC BLOOD PRESSURE: 118 MMHG | TEMPERATURE: 98 F | RESPIRATION RATE: 20 BRPM | BODY MASS INDEX: 35.5 KG/M2 | WEIGHT: 220.88 LBS

## 2017-12-22 DIAGNOSIS — R29.6 FREQUENT FALLS: ICD-10-CM

## 2017-12-22 DIAGNOSIS — Z12.4 SCREENING FOR CERVICAL CANCER: ICD-10-CM

## 2017-12-22 DIAGNOSIS — M54.50 CHRONIC BILATERAL LOW BACK PAIN WITHOUT SCIATICA: Primary | ICD-10-CM

## 2017-12-22 DIAGNOSIS — G89.29 CHRONIC BILATERAL LOW BACK PAIN WITHOUT SCIATICA: Primary | ICD-10-CM

## 2017-12-22 DIAGNOSIS — E11.9 TYPE 2 DIABETES MELLITUS WITHOUT COMPLICATION, WITHOUT LONG-TERM CURRENT USE OF INSULIN: ICD-10-CM

## 2017-12-22 PROCEDURE — 99214 OFFICE O/P EST MOD 30 MIN: CPT | Mod: S$PBB,,, | Performed by: INTERNAL MEDICINE

## 2017-12-22 PROCEDURE — 99214 OFFICE O/P EST MOD 30 MIN: CPT | Mod: PBBFAC,PN | Performed by: INTERNAL MEDICINE

## 2017-12-22 PROCEDURE — 99999 PR PBB SHADOW E&M-EST. PATIENT-LVL IV: CPT | Mod: PBBFAC,,, | Performed by: INTERNAL MEDICINE

## 2017-12-22 RX ORDER — MELOXICAM 15 MG/1
15 TABLET ORAL DAILY
Qty: 30 TABLET | Refills: 1 | Status: SHIPPED | OUTPATIENT
Start: 2017-12-22 | End: 2018-04-27 | Stop reason: SDUPTHER

## 2017-12-22 RX ORDER — INSULIN PUMP SYRINGE, 3 ML
EACH MISCELLANEOUS
Qty: 1 EACH | Refills: 0 | Status: SHIPPED | OUTPATIENT
Start: 2017-12-22 | End: 2018-02-21 | Stop reason: SDUPTHER

## 2017-12-22 RX ORDER — LANCETS
EACH MISCELLANEOUS
Qty: 30 EACH | Refills: 2 | Status: ON HOLD | OUTPATIENT
Start: 2017-12-22 | End: 2021-02-20 | Stop reason: HOSPADM

## 2017-12-22 RX ORDER — METHOCARBAMOL 750 MG/1
750 TABLET, FILM COATED ORAL 4 TIMES DAILY
Qty: 100 TABLET | Refills: 1 | Status: SHIPPED | OUTPATIENT
Start: 2017-12-22 | End: 2018-01-01

## 2017-12-22 RX ORDER — PROMETHAZINE HYDROCHLORIDE AND DEXTROMETHORPHAN HYDROBROMIDE 6.25; 15 MG/5ML; MG/5ML
5 SYRUP ORAL
Qty: 118 ML | Refills: 0 | Status: SHIPPED | OUTPATIENT
Start: 2017-12-22 | End: 2018-02-21

## 2017-12-22 NOTE — PROGRESS NOTES
"Subjective:       Patient ID: Audrey Natarajan is a 47 y.o. female.    Chief Complaint: Fall (injuried left side of body)    Multiple complaints    HPI: 48 y/o with DM, morbid obestiy s/p gastric sleeve, HTN chronic lower back pain presents to follow up chronic pains. Pain to bilateral hips lower back mid thoracic back and hands. Pain presents for > 10 years daily. Has been on narcotics in past currently using NSAID dialy (meloxicam) has used muscle relaxers intermittently in past. Never had any type of physical therapy. She describes episodes of her legs "giving out" on her cause multiple falls. No LOC she does have sensation peripherial neuropathy of both feet       Review of Systems   Constitutional: Negative for activity change, appetite change, fatigue, fever and unexpected weight change.   HENT: Negative for ear pain, rhinorrhea and sore throat.    Eyes: Negative for discharge and visual disturbance.   Respiratory: Negative for chest tightness, shortness of breath and wheezing.    Cardiovascular: Negative for chest pain, palpitations and leg swelling.   Gastrointestinal: Negative for abdominal pain, constipation and diarrhea.   Endocrine: Negative for cold intolerance and heat intolerance.   Genitourinary: Negative for dysuria and hematuria.   Musculoskeletal: Positive for arthralgias, back pain and gait problem. Negative for joint swelling and neck stiffness.   Skin: Negative for rash.   Neurological: Negative for dizziness, syncope, weakness and headaches.   Psychiatric/Behavioral: Negative for suicidal ideas.       Objective:     Vitals:    12/22/17 1124   BP: 118/80   BP Location: Left arm   Patient Position: Sitting   BP Method: Medium (Manual)   Pulse: 82   Resp: 20   Temp: 98.1 °F (36.7 °C)   TempSrc: Oral   SpO2: 95%   Weight: 100.2 kg (220 lb 14.4 oz)   Height: 5' 6" (1.676 m)          Physical Exam   Constitutional: She is oriented to person, place, and time. She appears well-developed and " well-nourished.   HENT:   Head: Normocephalic and atraumatic.   Eyes: Conjunctivae are normal. Pupils are equal, round, and reactive to light.   Neck: Normal range of motion.   Cardiovascular: Normal rate and regular rhythm.  Exam reveals no gallop and no friction rub.    No murmur heard.  Pulmonary/Chest: Effort normal and breath sounds normal. She has no wheezes. She has no rales.   Abdominal: Soft. Bowel sounds are normal. There is no tenderness. There is no rebound and no guarding.   Obese abdomen limiting exam   Musculoskeletal: She exhibits no edema or tenderness.   Tight hamstrings bilaterally negative SLR bilatrally No spinous process tenderness or step off deformities   Neurological: She is alert and oriented to person, place, and time. No cranial nerve deficit.   Skin: Skin is warm and dry.   Psychiatric: She has a normal mood and affect.       Assessment:       1. Chronic bilateral low back pain without sciatica    2. Screening for cervical cancer    3. Frequent falls    4. Type 2 diabetes mellitus without complication, without long-term current use of insulin        Plan:    1.  Discussed goal of treatment is to improve functionality not to completely remove pain. conitnue daily NSAID muscle realxer to target tightness referal to healthy back PT    2. GYN for pap smear    3. therap as above suspect some component of neuropathy versus medications contributing    4. a1c just at goal continue metformin

## 2018-01-09 ENCOUNTER — TELEPHONE (OUTPATIENT)
Dept: FAMILY MEDICINE | Facility: CLINIC | Age: 48
End: 2018-01-09

## 2018-01-09 NOTE — TELEPHONE ENCOUNTER
----- Message from Yamileth Foster sent at 1/8/2018  4:51 PM CST -----  Contact: self  Patient hurt her back and would like doctor to call some pain medication in. Patient can be reached at [phone].        Thanks,

## 2018-01-09 NOTE — TELEPHONE ENCOUNTER
----- Message from Chemo Reese sent at 1/8/2018  2:12 PM CST -----  Contact: Self/[phone]  Patient states that she hurt her back. She would like to speak to the staff regarding this matter. Thank you.

## 2018-01-09 NOTE — TELEPHONE ENCOUNTER
The patient states that she is still having lower back pain on her left side.    She is taking the mobic but is not getting relief from this medication. She would like to be seen but is a b patient and we have not available slots at this location until february.     Please advise.

## 2018-01-09 NOTE — TELEPHONE ENCOUNTER
----- Message from Yamileth Foster sent at 1/9/2018  1:42 PM CST -----  Contact: self  Patient is returning a call and can be reached at [phone].      Thanks,

## 2018-01-10 ENCOUNTER — OFFICE VISIT (OUTPATIENT)
Dept: OBSTETRICS AND GYNECOLOGY | Facility: CLINIC | Age: 48
End: 2018-01-10
Payer: MEDICAID

## 2018-01-10 VITALS
DIASTOLIC BLOOD PRESSURE: 60 MMHG | HEIGHT: 66 IN | WEIGHT: 229.25 LBS | BODY MASS INDEX: 36.84 KG/M2 | SYSTOLIC BLOOD PRESSURE: 120 MMHG

## 2018-01-10 DIAGNOSIS — I10 ESSENTIAL HYPERTENSION: ICD-10-CM

## 2018-01-10 DIAGNOSIS — Z01.419 VISIT FOR GYNECOLOGIC EXAMINATION: Primary | ICD-10-CM

## 2018-01-10 DIAGNOSIS — Z12.4 CERVICAL CANCER SCREENING: ICD-10-CM

## 2018-01-10 PROCEDURE — 99999 PR PBB SHADOW E&M-EST. PATIENT-LVL III: CPT | Mod: PBBFAC,,, | Performed by: OBSTETRICS & GYNECOLOGY

## 2018-01-10 PROCEDURE — 87624 HPV HI-RISK TYP POOLED RSLT: CPT

## 2018-01-10 PROCEDURE — 99213 OFFICE O/P EST LOW 20 MIN: CPT | Mod: PBBFAC | Performed by: OBSTETRICS & GYNECOLOGY

## 2018-01-10 PROCEDURE — 88175 CYTOPATH C/V AUTO FLUID REDO: CPT

## 2018-01-10 PROCEDURE — 99396 PREV VISIT EST AGE 40-64: CPT | Mod: S$PBB,,, | Performed by: OBSTETRICS & GYNECOLOGY

## 2018-01-10 RX ORDER — METOPROLOL TARTRATE 25 MG/1
25 TABLET, FILM COATED ORAL DAILY
Qty: 30 TABLET | Refills: 5 | Status: SHIPPED | OUTPATIENT
Start: 2018-01-10 | End: 2018-10-31 | Stop reason: SDUPTHER

## 2018-01-10 NOTE — LETTER
January 10, 2018      Donaldo Pena MD  [address] [facility] [address]           [facility]  [address] [facility] [address]  [facility] [address]  Phone: [phone]          Patient: Audrey Natarajan   MR Number: [id number]   YOB: 1970   Date of Visit: 1/10/2018       Dear Dr. Donaldo Pena:    Thank you for referring Audrey Natarajan to me for evaluation. Attached you will find relevant portions of my assessment and plan of care.    If you have questions, please do not hesitate to call me. I look forward to following Audrey Natarajan along with you.    Sincerely,    Yuri Arredondo MD    Enclosure  CC:  No Recipients    If you would like to receive this communication electronically, please contact [email or web address] or [phone] to request more information on [facility] access.    For providers and/or their staff who would like to refer a patient to [facility], please contact us through our one-stop-shop provider referral line, [facility], at [phone].    If you feel you have received this communication in error or would no longer like to receive these types of communications, please e-mail [email or web address]

## 2018-01-10 NOTE — PROGRESS NOTES
"[facility]  Ambulatory Clinic  Obstetrics & Gynecology    Visit Date:  1/10/2018    Chief Complaint:  Annual GYN exam    History of Present Illness:      Audrey Natarajan is a 47 y.o. G0 with h/o bilateral salpingo-oophorectomy here for a gynecologic exam.    Pt has no GYN complaints today.      Pt reports an uneventful transition into menopause and is not on hormone replacement therapy.      Last pap 2014 normal.    Pt denies active sexually transmitted infections.    Last mammo 11/2017 normal.    Pt performs monthly self breast examination, smokes tobacco, uses seat belts, and denies abuse.     Pt denies vaginal bleeding, vaginal discharge, dyspareunia, pelvic pain, bloating, early satiety, unintentional weight loss, breast mass/skin changes, incontinence, GI or urinary complaints.      Otherwise, the pt is in her usual state of health and has good follow-up with her PCP.    Past History:  Gynecologic history as noted above.    Review of Systems:      GENERAL:  No fever, fatigue, excessive weight gain or loss  HEENT:  No headaches, hearing changes, visual disturbance  RESPIRATORY:  No cough, shortness of breath  CARDIOVASCULAR:  No chest pain, leg swelling  BREAST:  No lump, pain, nipple discharge, skin changes  GASTROINTESTINAL:  No nausea, vomiting, abd pain, rectal bleeding   GENITOURINARY:  See HPI  HEMATOLOGIC:  No easy bruisability or bleeding   LYMPHATICS:  No enlarged nodes  PSYCHIATRIC:  No homicidal/suicidal ideations    Physical Exam:     /60 (BP Location: Left arm, Patient Position: Sitting, BP Method: Large (Manual))   Ht 5' 6" (1.676 m)   Wt 104 kg (229 lb 4.5 oz)   LMP 11/01/2011 (LMP Unknown) Comment: stated when she was 39  BMI 37.01 kg/m²   Pulse 68, Resp rate 16     GENERAL:  No acute distress, well-nourished  HEENT:  Atraumatic, anicteric, moist mucus membranes. Neck supple w/o masses.  BREAST:  Symmetric, nontender, no obvious masses, adenopathy, skin " changes or nipple discharge.  LUNGS:  Clear to auscultation  HEART:  Regular rate and rhythm  ABDOMEN:  Soft, non-tender, non-distended, normoactive bowel sounds, no obvious organomegaly  EXT:  Symmetric w/o cramping, claudication, or edema. +2 distal pulses.  SKIN:  No rashes or bruising  PSYCH:  Mood and affect appropriate    GENITOURINARY:    VULVAR:  Female external genitalia w/o obvious lesions. Female hair distribution. Normal urethral meatus. No gross lymphadenopathy.   VAGINA:  Pink, moist, well-rugated. Good support. No obvious lesion. No discharge.  CERVIX:  No cervical motion tenderness, discharge, or obvious lesions.   UTERUS:  Small, non-tender, normal contour  ADNEXA:  Surgically absent.   RECTAL:  Declined. No obvious external lesions  WET PREP:  Negative     Chaperone present for exam.    Assessment:     47 y.o. G0 with h/o bilateral salpingo-oophorectomy:    1. Routine gynecologic exam    Plan:    A gynecologic health assessment was performed with age appropriate counseling.    Cervical cancer screening - pap obtained.    STI screening - pt declined.      Screening mammogram up to date.    Encourage healthy lifestyle modifications, monthly self breast exams, Ca/Vit D, postmenopausal bleeding precautions.    Encourage tobacco cessation, pt not ready to quit, declined help.    F/u with PCP for health maintenance.    Return 1 year for gynecologic exam, or sooner as needed.  All questions answered, pt voiced understanding.        Yuri Arredondo MD

## 2018-01-12 ENCOUNTER — CLINICAL SUPPORT (OUTPATIENT)
Dept: REHABILITATION | Facility: HOSPITAL | Age: 48
End: 2018-01-12
Attending: INTERNAL MEDICINE
Payer: MEDICAID

## 2018-01-12 DIAGNOSIS — G89.29 CHRONIC LEFT-SIDED LOW BACK PAIN WITHOUT SCIATICA: ICD-10-CM

## 2018-01-12 DIAGNOSIS — M53.86 DECREASED RANGE OF MOTION OF LUMBAR SPINE: ICD-10-CM

## 2018-01-12 DIAGNOSIS — R29.898 WEAKNESS OF LEFT LOWER EXTREMITY: ICD-10-CM

## 2018-01-12 DIAGNOSIS — M54.50 CHRONIC LEFT-SIDED LOW BACK PAIN WITHOUT SCIATICA: ICD-10-CM

## 2018-01-12 PROBLEM — M54.40 CHRONIC LEFT-SIDED LOW BACK PAIN WITH SCIATICA: Status: ACTIVE | Noted: 2018-01-12

## 2018-01-12 PROCEDURE — 97110 THERAPEUTIC EXERCISES: CPT | Mod: PN

## 2018-01-12 PROCEDURE — 97161 PT EVAL LOW COMPLEX 20 MIN: CPT | Mod: PN
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
## 2018-01-15 ENCOUNTER — TELEPHONE (OUTPATIENT)
Dept: FAMILY MEDICINE | Facility: CLINIC | Age: 48
End: 2018-01-15

## 2018-01-15 ENCOUNTER — HOSPITAL ENCOUNTER (EMERGENCY)
Facility: HOSPITAL | Age: 48
Discharge: HOME | End: 2018-01-15
Attending: EMERGENCY MEDICINE
Payer: MEDICAID

## 2018-01-15 VITALS
DIASTOLIC BLOOD PRESSURE: 87 MMHG | OXYGEN SATURATION: 95 % | SYSTOLIC BLOOD PRESSURE: 144 MMHG | WEIGHT: 222 LBS | HEIGHT: 66 IN | RESPIRATION RATE: 20 BRPM | HEART RATE: 86 BPM | BODY MASS INDEX: 35.68 KG/M2 | TEMPERATURE: 98 F

## 2018-01-15 DIAGNOSIS — M54.32 SCIATICA, LEFT SIDE: Primary | ICD-10-CM

## 2018-01-15 PROCEDURE — 96372 THER/PROPH/DIAG INJ SC/IM: CPT

## 2018-01-15 PROCEDURE — 99283 EMERGENCY DEPT VISIT LOW MDM: CPT | Mod: 25

## 2018-01-15 PROCEDURE — 63600175 PHARM REV CODE 636 W HCPCS: Performed by: PHYSICIAN ASSISTANT

## 2018-01-15 RX ORDER — KETOROLAC TROMETHAMINE 30 MG/ML
30 INJECTION, SOLUTION INTRAMUSCULAR; INTRAVENOUS
Status: COMPLETED | OUTPATIENT
Start: 2018-01-15 | End: 2018-01-15

## 2018-01-15 RX ADMIN — KETOROLAC TROMETHAMINE 30 MG: 30 INJECTION, SOLUTION INTRAMUSCULAR at 02:01

## 2018-01-15 NOTE — TELEPHONE ENCOUNTER
The pt. States that she is having back pain and rates this as a 10 out of 10. Mrs. Natarajan states that otc pain meds are not helping nor is the ibuprofen that was prescribed.    The patient is requesting to have something stronger called in for back pain.    I advised the patient I would call her back once I receive direction from the provider.

## 2018-01-15 NOTE — TELEPHONE ENCOUNTER
Pt. Notified that an appointment would be needed for a further evaluation of the back pain that she is having.    The pt. Is a b slotted pt. And has an appointment scheduled to see Dr. Pena on 01/19/18. Non of the providers in our clinic have anything open sooner.

## 2018-01-15 NOTE — TELEPHONE ENCOUNTER
----- Message from Chemo Reese sent at 1/15/2018  9:57 AM CST -----  Contact: Self/[phone]  Patient returned staff's call. Thank you.

## 2018-01-15 NOTE — TELEPHONE ENCOUNTER
A message was left for this pt. To notify her that an appointment would be needed for a further evaluation.   The pt. Has an appt to see Dr. Pena on 01/19/18.

## 2018-01-15 NOTE — TELEPHONE ENCOUNTER
----- Message from Cris Wells sent at 1/15/2018  9:09 AM CST -----  Contact: self  Patient requesting to speak with nurse regarding back pain and medication. Pt can be reached at [phone]. Thank you!

## 2018-01-15 NOTE — ED TRIAGE NOTES
Pt presents to ED with c/o lower back pain that radiates to left hip x 2weeks. Pt self treated with OTC meds and did not have any relief. Pt has a hx of left sided sciatic pain. Pt has upcoming rehab appt and will have an MRI performed.

## 2018-01-15 NOTE — DISCHARGE INSTRUCTIONS
You have been given a long acting anti-inflammatory shot in the emergency room called Toradol.  Medication last 12 hours sensation does not take any other NSAIDs including but not limited to ibuprofen, meloxicam, naproxen.  You can restart your recommended NSAIDs by mouth tomorrow.     You should continue to take your prescribed medications for your sciatic nerve pain today including Gabapentin and Zanaflex. Remember that these medications can cause drowsiness so do not operate motor vehicles or drink alcohol.

## 2018-01-16 DIAGNOSIS — E11.59 HYPERTENSION ASSOCIATED WITH DIABETES: ICD-10-CM

## 2018-01-16 DIAGNOSIS — M54.32 LEFT SIDED SCIATICA: ICD-10-CM

## 2018-01-16 DIAGNOSIS — I15.2 HYPERTENSION ASSOCIATED WITH DIABETES: ICD-10-CM

## 2018-01-16 LAB
HPV16 AG SPEC QL: NEGATIVE
HPV16+18+H RISK 12 DNA CVX-IMP: NEGATIVE
HPV18 DNA SPEC QL NAA+PROBE: NEGATIVE

## 2018-01-16 RX ORDER — FUROSEMIDE 20 MG/1
TABLET ORAL
Qty: 30 TABLET | Refills: 1 | Status: SHIPPED | OUTPATIENT
Start: 2018-01-16 | End: 2018-03-13 | Stop reason: SDUPTHER

## 2018-01-16 RX ORDER — GABAPENTIN 600 MG/1
TABLET ORAL
Qty: 180 TABLET | Refills: 1 | Status: SHIPPED | OUTPATIENT
Start: 2018-01-16 | End: 2018-01-16 | Stop reason: SDUPTHER

## 2018-01-16 RX ORDER — GABAPENTIN 600 MG/1
TABLET ORAL
Qty: 180 TABLET | Refills: 1 | Status: SHIPPED | OUTPATIENT
Start: 2018-01-16 | End: 2018-08-03 | Stop reason: DRUGHIGH

## 2018-01-19 ENCOUNTER — OFFICE VISIT (OUTPATIENT)
Dept: FAMILY MEDICINE | Facility: CLINIC | Age: 48
End: 2018-01-19
Payer: MEDICAID

## 2018-01-19 VITALS
SYSTOLIC BLOOD PRESSURE: 130 MMHG | BODY MASS INDEX: 36.39 KG/M2 | OXYGEN SATURATION: 95 % | HEIGHT: 66 IN | TEMPERATURE: 99 F | RESPIRATION RATE: 17 BRPM | WEIGHT: 226.44 LBS | HEART RATE: 101 BPM | DIASTOLIC BLOOD PRESSURE: 62 MMHG

## 2018-01-19 DIAGNOSIS — G89.29 CHRONIC LEFT-SIDED LOW BACK PAIN WITHOUT SCIATICA: Primary | ICD-10-CM

## 2018-01-19 DIAGNOSIS — M54.50 CHRONIC LEFT-SIDED LOW BACK PAIN WITHOUT SCIATICA: Primary | ICD-10-CM

## 2018-01-19 LAB
BACTERIA #/AREA URNS HPF: NORMAL /HPF
BILIRUB UR QL STRIP: NEGATIVE
CLARITY UR: CLEAR
COLOR UR: YELLOW
GLUCOSE UR QL STRIP: NEGATIVE
HGB UR QL STRIP: NEGATIVE
KETONES UR QL STRIP: NEGATIVE
LEUKOCYTE ESTERASE UR QL STRIP: ABNORMAL
MICROSCOPIC COMMENT: NORMAL
NITRITE UR QL STRIP: NEGATIVE
PH UR STRIP: 6 [PH] (ref 5–8)
PROT UR QL STRIP: NEGATIVE
RBC #/AREA URNS HPF: 1 /HPF (ref 0–4)
SP GR UR STRIP: 1.02 (ref 1–1.03)
SQUAMOUS #/AREA URNS HPF: 3 /HPF
URN SPEC COLLECT METH UR: ABNORMAL
UROBILINOGEN UR STRIP-ACNC: NEGATIVE EU/DL
WBC #/AREA URNS HPF: 1 /HPF (ref 0–5)

## 2018-01-19 PROCEDURE — 81000 URINALYSIS NONAUTO W/SCOPE: CPT

## 2018-01-19 PROCEDURE — 99999 PR PBB SHADOW E&M-EST. PATIENT-LVL III: CPT | Mod: PBBFAC,,, | Performed by: INTERNAL MEDICINE

## 2018-01-19 PROCEDURE — 99213 OFFICE O/P EST LOW 20 MIN: CPT | Mod: PBBFAC,PN | Performed by: INTERNAL MEDICINE

## 2018-01-19 PROCEDURE — 99214 OFFICE O/P EST MOD 30 MIN: CPT | Mod: S$PBB,,, | Performed by: INTERNAL MEDICINE

## 2018-01-19 RX ORDER — MELOXICAM 7.5 MG/1
TABLET ORAL
Refills: 0 | COMMUNITY
Start: 2017-12-28 | End: 2018-02-09 | Stop reason: ALTCHOICE

## 2018-01-19 RX ORDER — TIZANIDINE 4 MG/1
TABLET ORAL
Refills: 0 | COMMUNITY
Start: 2018-01-03 | End: 2018-02-21

## 2018-01-19 RX ORDER — RISPERIDONE 4 MG/1
TABLET ORAL
Refills: 1 | COMMUNITY
Start: 2017-12-27 | End: 2018-02-21

## 2018-01-19 RX ORDER — HYDROXYZINE HYDROCHLORIDE 25 MG/1
TABLET, FILM COATED ORAL
Refills: 2 | COMMUNITY
Start: 2018-01-02 | End: 2018-08-03

## 2018-01-19 RX ORDER — CLONAZEPAM 1 MG/1
TABLET ORAL
Refills: 1 | COMMUNITY
Start: 2017-12-27 | End: 2019-09-01

## 2018-01-19 NOTE — PROGRESS NOTES
Subjective:       Patient ID: Audrey Natarajan is a 47 y.o. female.    Chief Complaint: Sciatica and Follow-up    Back Pain   This is a chronic problem. The current episode started more than 1 year ago. The problem occurs daily. The problem has been gradually worsening since onset. The pain is present in the gluteal and lumbar spine. The quality of the pain is described as cramping. The pain radiates to the left thigh. The symptoms are aggravated by bending, standing and twisting. Pertinent negatives include no abdominal pain, bladder incontinence, bowel incontinence, chest pain, dysuria, fever, headaches, numbness, paresthesias, tingling, weakness or weight loss. Risk factors include obesity, lack of exercise and poor posture. She has tried heat, home exercises, muscle relaxant and NSAIDs for the symptoms. The treatment provided mild relief.   referred to PT for healthy back program has only attended one session due to transportation issues doing daily hamstring stretches  Review of Systems   Constitutional: Negative for activity change, appetite change, fatigue, fever, unexpected weight change and weight loss.   HENT: Negative for ear pain, rhinorrhea and sore throat.    Eyes: Negative for discharge and visual disturbance.   Respiratory: Negative for chest tightness, shortness of breath and wheezing.    Cardiovascular: Negative for chest pain, palpitations and leg swelling.   Gastrointestinal: Negative for abdominal pain, bowel incontinence, constipation and diarrhea.   Endocrine: Negative for cold intolerance and heat intolerance.   Genitourinary: Negative for bladder incontinence, dysuria and hematuria.   Musculoskeletal: Positive for back pain. Negative for joint swelling and neck stiffness.   Skin: Negative for rash.   Neurological: Negative for dizziness, tingling, syncope, weakness, numbness, headaches and paresthesias.   Psychiatric/Behavioral: Negative for suicidal ideas.       Objective:     Vitals:  "   01/19/18 1052   BP: 130/62   BP Location: Right arm   Patient Position: Sitting   BP Method: Medium (Manual)   Pulse: 101   Resp: 17   Temp: 98.5 °F (36.9 °C)   TempSrc: Oral   SpO2: 95%   Weight: 102.7 kg (226 lb 6.6 oz)   Height: 5' 6" (1.676 m)          Physical Exam   Constitutional: She is oriented to person, place, and time. She appears well-developed and well-nourished.   HENT:   Head: Normocephalic and atraumatic.   Eyes: Conjunctivae are normal. Pupils are equal, round, and reactive to light.   Neck: Normal range of motion.   Cardiovascular: Normal rate and regular rhythm.  Exam reveals no gallop and no friction rub.    No murmur heard.  Pulmonary/Chest: Effort normal and breath sounds normal. She has no wheezes. She has no rales.   Abdominal: Soft. Bowel sounds are normal. There is no tenderness. There is no rebound and no guarding.   obese   Musculoskeletal: She exhibits tenderness. She exhibits no edema.   Decrease passive hip flexion bilaterally   Neurological: She is alert and oriented to person, place, and time. No cranial nerve deficit.   Normal gait no foot drop   Skin: Skin is warm and dry.   Psychiatric: She has a normal mood and affect.       Assessment:       1. Chronic left-sided low back pain without sciatica        Plan:    1. Continue NSAID muscle relaxer and PT. Check u/a for  causes       "

## 2018-01-19 NOTE — MEDICAL/APP STUDENT
Subjective:       Patient ID: Audrey Natarajan is a 47 y.o. female.    Chief Complaint: Sciatica and Follow-up    Ms. Natarajan is a 47 y.o. F who presents today with lower back pain. She states that the left lower back pain has been occurring for the past 2 months. She does not attribute it to any trauma or heavy lifting. She describes the pain as an aching pain that radiates to her buttocks, hip,quadriceps and knee. She also endorses back pain that radiates up her back. She went to the ED on 1/15/18 and was given Toradol IM. She states that the Toradol helped but after it wore off the pain returned. She has been taking meloxicam and ibuprofen without relief. She has also tried applying heat and ice which have given her mild symptomatic relief. She is scheduled to go to rehab next week. Patient denies numbness, tingling, recent trauma or heaving lifting.       Review of Systems   Constitutional: Negative.    HENT: Negative.    Eyes: Negative.    Respiratory: Negative.    Cardiovascular: Negative.    Gastrointestinal: Negative.    Genitourinary: Negative.    Musculoskeletal: Positive for back pain.        Left lower back pain radiating to buttocks,hip, and quadriceps.    Neurological: Negative.    Psychiatric/Behavioral: Negative.        Objective:       Vitals:    01/19/18 1052   BP: 130/62   Pulse: 101   Resp: 17   Temp: 98.5 °F (36.9 °C)     Physical Exam   Constitutional: She appears well-developed and well-nourished. No distress.   Cardiovascular: Normal rate, regular rhythm, normal heart sounds and intact distal pulses.    Pulmonary/Chest: Effort normal and breath sounds normal.   Musculoskeletal:   Negative straight leg raise. Left sided weakness on hip flexion.    Neurological: Gait normal.   Slight limp favoring the right side   Skin: She is not diaphoretic.       Assessment:       1. Chronic left-sided low back pain without sciatica      - The pain the patient describes seems to be muscular in nature  rather than neurologic as sciatica would normally radiate to the posterior leg rather than the anterior leg.   Plan:   1. Encourage mobility/movement      Patient is scheduled for rehab next week      Continue with gabapentin 600 mg BID      Continue Meloxicam       Continue Tizanidine 4 mg      Will check U/A to rule out UTI which may cause back pain    F/U 2 months

## 2018-01-24 ENCOUNTER — CLINICAL SUPPORT (OUTPATIENT)
Dept: REHABILITATION | Facility: HOSPITAL | Age: 48
End: 2018-01-24
Attending: INTERNAL MEDICINE
Payer: MEDICAID

## 2018-01-24 DIAGNOSIS — M54.40 CHRONIC LEFT-SIDED LOW BACK PAIN WITH SCIATICA, SCIATICA LATERALITY UNSPECIFIED: ICD-10-CM

## 2018-01-24 DIAGNOSIS — R29.898 WEAKNESS OF LEFT LOWER EXTREMITY: ICD-10-CM

## 2018-01-24 DIAGNOSIS — M54.50 CHRONIC LEFT-SIDED LOW BACK PAIN WITHOUT SCIATICA: ICD-10-CM

## 2018-01-24 DIAGNOSIS — K21.9 GASTROESOPHAGEAL REFLUX DISEASE WITHOUT ESOPHAGITIS: ICD-10-CM

## 2018-01-24 DIAGNOSIS — M53.86 DECREASED RANGE OF MOTION OF LUMBAR SPINE: ICD-10-CM

## 2018-01-24 DIAGNOSIS — G89.29 CHRONIC LEFT-SIDED LOW BACK PAIN WITH SCIATICA, SCIATICA LATERALITY UNSPECIFIED: ICD-10-CM

## 2018-01-24 DIAGNOSIS — G89.29 CHRONIC LEFT-SIDED LOW BACK PAIN WITHOUT SCIATICA: ICD-10-CM

## 2018-01-24 PROCEDURE — 97110 THERAPEUTIC EXERCISES: CPT | Mod: PN

## 2018-01-24 RX ORDER — METFORMIN HYDROCHLORIDE 1000 MG/1
TABLET ORAL
Qty: 60 TABLET | Refills: 2 | Status: SHIPPED | OUTPATIENT
Start: 2018-01-24 | End: 2018-04-27 | Stop reason: SDUPTHER

## 2018-01-24 NOTE — PROGRESS NOTES
[facility]      Name: Audrey Natarajan  Clinic Number: [id number]  Diagnosis:   Encounter Diagnoses   Name Primary?    Chronic left-sided low back pain with sciatica, sciatica laterality unspecified     Chronic left-sided low back pain without sciatica     Weakness of left lower extremity     Decreased range of motion of lumbar spine      Physician: Donaldo Pena MD  Precautions: Seizures, COPD, A-fib, Bipolar 1 disorder, HTN, pulmonary embolism, DVT  Visit #: 1 of 6  GONZALEZ: 18  Total Visits: 2    PTA Visit #: 1  Time In: 0955  Time Out: 1105  Total Treatment Time: 60 mins (1:1 with PTA duration of treatment session; 10 mins of ice)    Pain Pattern: 1 PEP  Date POC Signed: 18    Subjective     Audrey reports increased low back pain last week. Patient reports going to the chiropractor and feeling pain relief. Patient reports her current pain is local to her Left low back into her hip and anterior knee. Patient reports compliance with her HEP.    Patient reports their pain to be 6-7 out of 10 on a 0-10 scale with 0 being no pain and 10 being the worst pain imaginable. Patient reports pain to be a 3 out of 10 post treatment session.    Pain Location: Lower back into Left hip and anterior knee     Prior functional status: Independent  DME owned/used: No  Occupation: on disability   Leisure: N/A                     Pts goals: Decrease lower back pain and no falls    Objective     Baseline IM Testing Results:   Date of testin2018  ROM 0 to 60 deg   Max Peak Torque 105    Min Peak Torque 14    Flex/Ext Ratio 7.5   % below normative data ---       Treatment      Audrey received individual therapeutic exercises to develop strength, endurance, ROM, flexibility, posture and core stabilization for 60 minutes including:    [facility] 2018   Visit Number 2   VAS Pain Rating 7   Treadmill Time (in min.) 10   Speed 1.3   Incline 0   Flexion in Lying 10    Lumbar Extension Seat Pad 0   Femur Restraint 4   Top Dead Center 24   Counterweight 148   Lumbar Flexion 60   Lumbar Extension 0   Lumbar Peak Torque -   Min Torque -   Lumbar Weight 45   Repetitions 20   Rating of Perceived Exertion 3   Ice - Z Lie (in min.) 10     PF Stretch: 4 x 20 sec hold ea.  LTR: 2 x 10  HS Stretch: 4 x 20 sec hold ea.  DKTC: 10 x 10 sec hold  Prone on elbows: 2 minutes    Peripheral muscle strengthening which included 1 set of 15-20 repetitions at a slow, controlled 7 second per rep pace focused on strengthening supporting musculature for improved body mechanics and functional mobility. Pt and therapist focused on proper form during treatment to ensure optimal strengthening of each targeted muscle group. Machines were utilized including torso rotation, leg extension, leg curl, chest press, upright row, tricep extension, bicep curl, leg press, and hip abduction.    Audrey received the following manual therapy techniques: none    Written Home Exercises Provided: reviewed from initial evaluation - added PF stretch and EIS today  Pt demo good understanding of the education provided. Audrey demonstrated good return demonstration of activities.     Education provided re: importance of centralization of symptoms  Audrey verbalized good understanding of education provided.   No spiritual or educational barriers to learning provided    Assessment     Audrey tolerated treatment session well today. Patient with good tolerance to initiation of therapeutic exercises with no exacerbation of low back or Left leg pain. Patient reported centralization of symptoms with piriformis stretch and prone on elbows. Patient with good tolerance to MedX machine and peripheral strengthening exercises with appropriate training effect achieved. Patient reported a decrease in her overall pain level post treatment session. Patient was issued an updated HEP including piriformis stretch and standing extension.  This is a  47 y.o. female referred to outpatient physical therapy and presents with a medical diagnosis of chronic low back pain without sciatica and demonstrates limitations as described in the problem list. Pt prognosis is Good. Pt will continue to benefit from skilled outpatient physical therapy to address the deficits listed in the problem list, provide pt/family education and to maximize pt's level of independence in the home and community environment.     Goals as follows:  Short term goals:  6 weeks or 10 visits   1.  Pt will demonstrate increased lumbar ROM by at least 3 degrees from the initial ROM value with improvements noted in functional ROM and ability to perform ADLs  2.  Pt will demonstrate increased maximum isometric torque value by 5% when compared to the initial value resulting in improved ability to perform bending, lifting, and carrying activities safely, confidently.  3.  Patient report a reduction in worst pain score by 1-2 points for improved tolerance during work and recreational activities  4.  Pt able to perform HEP correctly with minimal cueing or supervision for therapist    Plan     Continue with established Plan of Care towards established PT goals.     Certification Period: 1/12/18 to 4/13/18 (PN due by 2/12/18)    Therapist: Kelli Prakash, PTA  01/24/2018

## 2018-01-24 NOTE — PATIENT INSTRUCTIONS
Backward Bend (Standing)        Arch backward to make hollow of back deeper. Hold 5 seconds.  Repeat 10 times per set. Do 1 set per session. Do 1-2 sessions per day.     [email or web address]       Piriformis Stretch with IR/Adduction    Pull left knee across body to opposite shoulder. Hold slight stretch for 20 seconds. Repeat 4 times. Do 1-2 sessions per day.      Copyright © I. All rights reserved.

## 2018-01-31 ENCOUNTER — CLINICAL SUPPORT (OUTPATIENT)
Dept: REHABILITATION | Facility: HOSPITAL | Age: 48
End: 2018-01-31
Attending: INTERNAL MEDICINE
Payer: MEDICAID

## 2018-01-31 DIAGNOSIS — M53.86 DECREASED RANGE OF MOTION OF LUMBAR SPINE: ICD-10-CM

## 2018-01-31 DIAGNOSIS — R29.898 WEAKNESS OF LEFT LOWER EXTREMITY: ICD-10-CM

## 2018-01-31 DIAGNOSIS — G89.29 CHRONIC LEFT-SIDED LOW BACK PAIN WITH SCIATICA, SCIATICA LATERALITY UNSPECIFIED: ICD-10-CM

## 2018-01-31 DIAGNOSIS — M54.40 CHRONIC LEFT-SIDED LOW BACK PAIN WITH SCIATICA, SCIATICA LATERALITY UNSPECIFIED: ICD-10-CM

## 2018-01-31 DIAGNOSIS — M54.50 CHRONIC LEFT-SIDED LOW BACK PAIN WITHOUT SCIATICA: ICD-10-CM

## 2018-01-31 DIAGNOSIS — G89.29 CHRONIC LEFT-SIDED LOW BACK PAIN WITHOUT SCIATICA: ICD-10-CM

## 2018-01-31 PROCEDURE — 97110 THERAPEUTIC EXERCISES: CPT | Mod: PN

## 2018-01-31 NOTE — PROGRESS NOTES
[facility]      Name: Audrey Natarajan  Clinic Number: [id number]  Diagnosis:   Encounter Diagnoses   Name Primary?    Chronic left-sided low back pain with sciatica, sciatica laterality unspecified     Chronic left-sided low back pain without sciatica     Weakness of left lower extremity     Decreased range of motion of lumbar spine      Physician: Donaldo Pena MD  Precautions: Seizures, COPD, A-fib, Bipolar 1 disorder, HTN, pulmonary embolism, DVT  Visit #: 2 of 6  GONZALEZ: 18  Total Visits: 3    PTA Visit #: 2  Time In: 1000  Time Out: 1100  Total Treatment Time: 60 mins (1:1 with PTA duration of treatment session)    Pain Pattern: 1 PEP  Date POC Signed: 18    Subjective     Audrey reports middle low back pain this morning. Patient reports doing her stretches at home and noticing that her pain moved from her Left knee to the middle of her back.     Patient reports their pain to be 4 out of 10 on a 0-10 scale with 0 being no pain and 10 being the worst pain imaginable.    Pain Location: Lower back into Left hip and anterior knee     Prior functional status: Independent  DME owned/used: No  Occupation: on disability   Leisure: N/A                     Pts goals: Decrease lower back pain and no falls    Objective     Baseline IM Testing Results:   Date of testin2018  ROM 0 to 60 deg   Max Peak Torque 105    Min Peak Torque 14    Flex/Ext Ratio 7.5   % below normative data ---       Treatment      Audrey received individual therapeutic exercises to develop strength, endurance, ROM, flexibility, posture and core stabilization for 60 minutes including:    [facility] 2018   Visit Number 3   VAS Pain Rating 4   Treadmill Time (in min.) 10   Speed 1.3   Incline 0   Flexion in Lying -   Lumbar Extension Seat Pad 0   Femur Restraint 4   Top Dead Center 24   Counterweight 148   Lumbar Flexion 54   Lumbar Extension 0   Lumbar Peak Torque -   Min  Torque -   Lumbar Weight 45   Repetitions 20   Rating of Perceived Exertion 3   Ice - Z Lie (in min.) 10     PF Stretch: 4 x 20 sec hold ea.  LTR: 2 x 10  HS Stretch: 4 x 20 sec hold ea.  DKTC: 10 x 10 sec hold  Prone on elbows: 2 minutes    Peripheral muscle strengthening which included 1 set of 15-20 repetitions at a slow, controlled 7 second per rep pace focused on strengthening supporting musculature for improved body mechanics and functional mobility. Pt and therapist focused on proper form during treatment to ensure optimal strengthening of each targeted muscle group. Machines were utilized including torso rotation, leg extension, chest press, upright row, tricep extension, and bicep curl.  Not performed today: Leg curl, leg press, hip abduction, hip adduction.    Audrey received the following manual therapy techniques: none    Written Home Exercises Provided: reviewed from initial evaluation - added PF stretch and EIS today  Pt demo good understanding of the education provided. Audrey demonstrated good return demonstration of activities.     Education provided re: importance of centralization of symptoms  Audrey verbalized good understanding of education provided.   No spiritual or educational barriers to learning provided    Assessment     Audrey tolerated treatment session well today. Patient with good tolerance to addition of peripheral strengthening exercises with no exacerbation of low back or Left LE pain. Patient challenged with torso rotation exercise, specifically rotation from Left to Right, with only being able to perform 17 reps. Patient demonstrated increased muscle fatigue with bicep curls and leg extension. Plan to continue progressing patient on weighted machines per tolerance.   This is a 47 y.o. female referred to outpatient physical therapy and presents with a medical diagnosis of chronic low back pain without sciatica and demonstrates limitations as described in the problem list. Pt  prognosis is Good. Pt will continue to benefit from skilled outpatient physical therapy to address the deficits listed in the problem list, provide pt/family education and to maximize pt's level of independence in the home and community environment.     Goals as follows:  Short term goals:  6 weeks or 10 visits   1.  Pt will demonstrate increased lumbar ROM by at least 3 degrees from the initial ROM value with improvements noted in functional ROM and ability to perform ADLs  2.  Pt will demonstrate increased maximum isometric torque value by 5% when compared to the initial value resulting in improved ability to perform bending, lifting, and carrying activities safely, confidently.  3.  Patient report a reduction in worst pain score by 1-2 points for improved tolerance during work and recreational activities  4.  Pt able to perform HEP correctly with minimal cueing or supervision for therapist    Plan     Continue with established Plan of Care towards established PT goals.     Certification Period: 1/12/18 to 4/13/18 (PN due by 2/12/18)    Therapist: Kelli Prakash, PTA  01/31/2018

## 2018-02-07 ENCOUNTER — CLINICAL SUPPORT (OUTPATIENT)
Dept: REHABILITATION | Facility: HOSPITAL | Age: 48
End: 2018-02-07
Attending: INTERNAL MEDICINE
Payer: MEDICAID

## 2018-02-07 ENCOUNTER — TELEPHONE (OUTPATIENT)
Dept: FAMILY MEDICINE | Facility: CLINIC | Age: 48
End: 2018-02-07

## 2018-02-07 DIAGNOSIS — M54.50 CHRONIC LEFT-SIDED LOW BACK PAIN WITHOUT SCIATICA: ICD-10-CM

## 2018-02-07 DIAGNOSIS — G89.29 CHRONIC LEFT-SIDED LOW BACK PAIN WITH SCIATICA, SCIATICA LATERALITY UNSPECIFIED: ICD-10-CM

## 2018-02-07 DIAGNOSIS — G89.29 CHRONIC LEFT-SIDED LOW BACK PAIN WITHOUT SCIATICA: Primary | ICD-10-CM

## 2018-02-07 DIAGNOSIS — M54.40 CHRONIC LEFT-SIDED LOW BACK PAIN WITH SCIATICA, SCIATICA LATERALITY UNSPECIFIED: ICD-10-CM

## 2018-02-07 DIAGNOSIS — R29.898 WEAKNESS OF LEFT LOWER EXTREMITY: ICD-10-CM

## 2018-02-07 DIAGNOSIS — G89.29 CHRONIC LEFT-SIDED LOW BACK PAIN WITHOUT SCIATICA: ICD-10-CM

## 2018-02-07 DIAGNOSIS — M54.50 CHRONIC LEFT-SIDED LOW BACK PAIN WITHOUT SCIATICA: Primary | ICD-10-CM

## 2018-02-07 DIAGNOSIS — M53.86 DECREASED RANGE OF MOTION OF LUMBAR SPINE: ICD-10-CM

## 2018-02-07 PROCEDURE — 97110 THERAPEUTIC EXERCISES: CPT | Mod: PN

## 2018-02-07 NOTE — TELEPHONE ENCOUNTER
----- Message from Zoe Christianson sent at 2/7/2018 11:36 AM CST -----  Contact: Self  Stated she was waiting on her Ibuprofen medication, but never received it. Please call.    Davina Kovacs on [facility] [address]    Call back [phone]

## 2018-02-07 NOTE — PROGRESS NOTES
[facility]      Name: Audrey Natarajan  Clinic Number: [id number]  Diagnosis:   Encounter Diagnoses   Name Primary?    Chronic left-sided low back pain with sciatica, sciatica laterality unspecified     Chronic left-sided low back pain without sciatica     Weakness of left lower extremity     Decreased range of motion of lumbar spine      Physician: Donaldo Pena MD  Precautions: Seizures, COPD, A-fib, Bipolar 1 disorder, HTN, pulmonary embolism, DVT  Visit #: 3 of 6  GONZALEZ: 18  Total Visits: 4    PTA Visit #: 3  Time In: 1015  Time Out: 1130  Total Treatment Time: 65 mins (1:1 with PTA 45 mins of treatment session)    Pain Pattern: 1 PEP  Date POC Signed: 18    Subjective     Audrey reports having some Left side low back pain today secondary to vacuuming and moving her couch. Patient reports Left hip pain on , however states that she did her stretches and the pain moved to her low back. Patient states that she feels like the program is helping her to manage her pain and is no longer going to the ED for pain medicine or a shot.    Patient reports their pain to be 4 out of 10 on a 0-10 scale with 0 being no pain and 10 being the worst pain imaginable.    Pain Location: Lower back into Left hip and anterior knee     Prior functional status: Independent  DME owned/used: No  Occupation: on disability   Leisure: N/A                     Pts goals: Decrease lower back pain and no falls    Objective     Baseline IM Testing Results:   Date of testin2018  ROM 0 to 60 deg   Max Peak Torque 105    Min Peak Torque 14    Flex/Ext Ratio 7.5   % below normative data ---       Treatment      Audrey received individual therapeutic exercises to develop strength, endurance, ROM, flexibility, posture and core stabilization for 65 minutes including:    [facility] 2018   Visit Number 4   VAS Pain Rating 4   Treadmill Time (in min.) 10   Speed 1.3   Incline  0   Flexion in Lying 10   Lumbar Extension Seat Pad 0   Femur Restraint 4 - not used today   Top Dead Center 24   Counterweight 148   Lumbar Flexion 54   Lumbar Extension 0   Lumbar Peak Torque -   Min Torque -   Lumbar Weight 45   Repetitions 20   Rating of Perceived Exertion 3   Ice - Z Lie (in min.) 10     PF Stretch: 4 x 20 sec hold ea.  LTR: 2 x 10  HS Stretch: 4 x 20 sec hold ea.  DKTC: 10 x 10 sec hold  Prone on elbows: 2 minutes - not today    Peripheral muscle strengthening which included 1 set of 15-20 repetitions at a slow, controlled 7 second per rep pace focused on strengthening supporting musculature for improved body mechanics and functional mobility. Pt and therapist focused on proper form during treatment to ensure optimal strengthening of each targeted muscle group. Machines were utilized including torso rotation, leg extension, chest press, upright row, tricep extension, and bicep curl.  Not performed today: Leg curl, leg press, hip abduction, hip adduction.    Audrey received the following manual therapy techniques: none    Written Home Exercises Provided: reviewed from initial evaluation - added PF stretch and EIS today  Pt demo good understanding of the education provided. Audrey demonstrated good return demonstration of activities.     Education provided re: pros and cons of a back brace; use of ice at home for muscle soreness and pain; [facility]  Audrey verbalized good understanding of education provided.   No spiritual or educational barriers to learning provided    Assessment     Audrey tolerated treatment session very well today with no exacerbation of low back pain. Patient with good tolerance to peripheral strengthening exercises with appropriate muscle fatigue reported at end of session. Patient continues to have difficulty with torso rotation exercise, specifically rotation from Left to Right, with only being  able to perform 17 reps. Plan to continue progressing patient on weighted machines per tolerance.    This is a 47 y.o. female referred to outpatient physical therapy and presents with a medical diagnosis of chronic low back pain without sciatica and demonstrates limitations as described in the problem list. Pt prognosis is Good. Pt will continue to benefit from skilled outpatient physical therapy to address the deficits listed in the problem list, provide pt/family education and to maximize pt's level of independence in the home and community environment.     Goals as follows:  Short term goals:  6 weeks or 10 visits   1.  Pt will demonstrate increased lumbar ROM by at least 3 degrees from the initial ROM value with improvements noted in functional ROM and ability to perform ADLs  2.  Pt will demonstrate increased maximum isometric torque value by 5% when compared to the initial value resulting in improved ability to perform bending, lifting, and carrying activities safely, confidently.  3.  Patient report a reduction in worst pain score by 1-2 points for improved tolerance during work and recreational activities  4.  Pt able to perform HEP correctly with minimal cueing or supervision for therapist    Plan     Continue with established Plan of Care towards established PT goals.     Certification Period: 1/12/18 to 4/13/18 (PN due by 2/12/18)    Therapist: Kelli Prakash, PTA  02/07/2018

## 2018-02-09 RX ORDER — IBUPROFEN 600 MG/1
600 TABLET ORAL EVERY 8 HOURS PRN
Qty: 60 TABLET | Refills: 1 | Status: SHIPPED | OUTPATIENT
Start: 2018-02-09 | End: 2018-04-27

## 2018-02-09 NOTE — TELEPHONE ENCOUNTER
----- Message from Mary Mancia sent at 2/9/2018  3:34 PM CST -----  Contact: Self  Patient would like to be referred to Pain Management. Please call at [phone].

## 2018-02-09 NOTE — TELEPHONE ENCOUNTER
Pt states she went to see Dr.Van Mae and she had a MRI she would like to get ibuprofen for pain. She states she is in pain and don't want to go to the ED. She would like to know what to do

## 2018-02-09 NOTE — TELEPHONE ENCOUNTER
----- Message from Mary Mancia sent at 2/9/2018  3:22 PM CST -----  Contact: Self  Patient is saying she needs something stronger than her Ibuprofen, but still wants to keep ibuprofen. Please call at [phone].      Bethanie Patel

## 2018-02-14 ENCOUNTER — CLINICAL SUPPORT (OUTPATIENT)
Dept: REHABILITATION | Facility: HOSPITAL | Age: 48
End: 2018-02-14
Attending: INTERNAL MEDICINE
Payer: MEDICAID

## 2018-02-14 DIAGNOSIS — M54.50 CHRONIC LEFT-SIDED LOW BACK PAIN WITHOUT SCIATICA: ICD-10-CM

## 2018-02-14 DIAGNOSIS — M54.40 CHRONIC LEFT-SIDED LOW BACK PAIN WITH SCIATICA, SCIATICA LATERALITY UNSPECIFIED: ICD-10-CM

## 2018-02-14 DIAGNOSIS — M53.86 DECREASED RANGE OF MOTION OF LUMBAR SPINE: ICD-10-CM

## 2018-02-14 DIAGNOSIS — G89.29 CHRONIC LEFT-SIDED LOW BACK PAIN WITH SCIATICA, SCIATICA LATERALITY UNSPECIFIED: ICD-10-CM

## 2018-02-14 DIAGNOSIS — R29.898 WEAKNESS OF LEFT LOWER EXTREMITY: ICD-10-CM

## 2018-02-14 DIAGNOSIS — G89.29 CHRONIC LEFT-SIDED LOW BACK PAIN WITHOUT SCIATICA: ICD-10-CM

## 2018-02-14 PROCEDURE — 97110 THERAPEUTIC EXERCISES: CPT | Mod: PN

## 2018-02-14 NOTE — PROGRESS NOTES
[facility]      Name: Audrey Natarajan  Clinic Number: [id number]  Diagnosis:   Encounter Diagnoses   Name Primary?    Chronic left-sided low back pain with sciatica, sciatica laterality unspecified     Chronic left-sided low back pain without sciatica     Weakness of left lower extremity     Decreased range of motion of lumbar spine      Physician: Donaldo Pena MD  Precautions: Seizures, COPD, A-fib, Bipolar 1 disorder, HTN, pulmonary embolism, DVT  Visit #: 4 of 6  GONZALEZ: 18  Total Visits: 5    PTA Visit #: 4  Time In: 1015  Time Out: 1115  Total Treatment Time: 60 mins (1:1 with PTA duration of treatment session)    Pain Pattern: 1 PEP  Date POC Signed: 18    Subjective     Audrey reports that her pain remains in the middle of her low back.    Patient reports their pain to be 4 out of 10 on a 0-10 scale with 0 being no pain and 10 being the worst pain imaginable.    Pain Location: Lower back into Left hip and anterior knee     Prior functional status: Independent  DME owned/used: No  Occupation: on disability   Leisure: N/A                     Pts goals: Decrease lower back pain and no falls    Objective     Baseline IM Testing Results:   Date of testin2018  ROM 0 to 60 deg   Max Peak Torque 105    Min Peak Torque 14    Flex/Ext Ratio 7.5   % below normative data ---       Treatment      Audrey received individual therapeutic exercises to develop strength, endurance, ROM, flexibility, posture and core stabilization for 60 minutes including:    [facility] 2018   Visit Number 5   VAS Pain Rating 4   Treadmill Time (in min.) 10   Speed 1.3   Incline 0   Flexion in Lying 10   Lumbar Extension Seat Pad -   Femur Restraint -   Top Dead Center -   Counterweight -   Lumbar Flexion -   Lumbar Extension -   Lumbar Peak Torque -   Min Torque -   Lumbar Weight 50   Repetitions 20   Rating of Perceived Exertion 3   Ice - Z Lie (in min.) 10     PF  Stretch: 4 x 20 sec hold  LTR: 2 x 10  HS Stretch: 4 x 20 sec hold ea.  DKTC: 10 x 10 sec hold  Prone on elbows: 2 minutes - not today    Peripheral muscle strengthening which included 1 set of 15-20 repetitions at a slow, controlled 7 second per rep pace focused on strengthening supporting musculature for improved body mechanics and functional mobility. Pt and therapist focused on proper form during treatment to ensure optimal strengthening of each targeted muscle group. Machines were utilized including torso rotation, leg press, leg extension, leg curl, hip abduction/adduction, chest press, upright row, tricep extension, and bicep curl.    Audrey received the following manual therapy techniques: none    Written Home Exercises Provided: reviewed from initial evaluation - added PF stretch and EIS today  Pt demo good understanding of the education provided. Audrey demonstrated good return demonstration of activities.     Education provided re: pros and cons of a back brace; use of ice at home for muscle soreness and pain; medical fitness program offered through [facility] after completing [facility] program  Audrey verbalized good understanding of education provided.   No spiritual or educational barriers to learning provided    Assessment     Audrey tolerated treatment session very well today with no exacerbation of low back pain. Patient with good tolerance to progression of peripheral strengthening exercises with appropriate muscle fatigue reported at end of session. Patient continues to have difficulty with torso rotation exercise, specifically rotation from Left to Right, however was able to perform 20 reps.   This is a 47 y.o. female referred to outpatient physical therapy and presents with a medical diagnosis of chronic low back pain without sciatica and demonstrates limitations as described in the problem list. Pt prognosis is Good. Pt will continue to benefit from skilled outpatient physical therapy to  address the deficits listed in the problem list, provide pt/family education and to maximize pt's level of independence in the home and community environment.     Goals as follows:  Short term goals:  6 weeks or 10 visits   1.  Pt will demonstrate increased lumbar ROM by at least 3 degrees from the initial ROM value with improvements noted in functional ROM and ability to perform ADLs  2.  Pt will demonstrate increased maximum isometric torque value by 5% when compared to the initial value resulting in improved ability to perform bending, lifting, and carrying activities safely, confidently.  3.  Patient report a reduction in worst pain score by 1-2 points for improved tolerance during work and recreational activities  4.  Pt able to perform HEP correctly with minimal cueing or supervision for therapist    Plan     Continue with established Plan of Care towards established PT goals.     Certification Period: 1/12/18 to 4/13/18 (PN due by 2/12/18)    Therapist: Kelli Prakash, PTA  02/14/2018

## 2018-02-15 ENCOUNTER — TELEPHONE (OUTPATIENT)
Dept: FAMILY MEDICINE | Facility: CLINIC | Age: 48
End: 2018-02-15

## 2018-02-15 NOTE — TELEPHONE ENCOUNTER
----- Message from Linda Golden sent at 2/14/2018 10:02 AM CST -----  Contact: Pt in person  Pt states she now has a feature w/ her insurance where she can get over the counter products for free as long as the doctor calls them in for her. She needs allergy meds, pain relievers, vitamins, rubbing alcohol, band-aids, neosporin. Please call [phone]

## 2018-02-16 ENCOUNTER — TELEPHONE (OUTPATIENT)
Dept: FAMILY MEDICINE | Facility: CLINIC | Age: 48
End: 2018-02-16

## 2018-02-16 NOTE — TELEPHONE ENCOUNTER
A second message was left for this pt.   RE: Must schedule an office visit for her prescription requests.

## 2018-02-21 ENCOUNTER — TELEPHONE (OUTPATIENT)
Dept: FAMILY MEDICINE | Facility: CLINIC | Age: 48
End: 2018-02-21

## 2018-02-21 ENCOUNTER — OFFICE VISIT (OUTPATIENT)
Dept: FAMILY MEDICINE | Facility: CLINIC | Age: 48
End: 2018-02-21
Payer: MEDICAID

## 2018-02-21 ENCOUNTER — CLINICAL SUPPORT (OUTPATIENT)
Dept: REHABILITATION | Facility: HOSPITAL | Age: 48
End: 2018-02-21
Attending: INTERNAL MEDICINE
Payer: MEDICAID

## 2018-02-21 VITALS
HEART RATE: 79 BPM | OXYGEN SATURATION: 97 % | HEIGHT: 66 IN | BODY MASS INDEX: 35.79 KG/M2 | SYSTOLIC BLOOD PRESSURE: 118 MMHG | RESPIRATION RATE: 16 BRPM | TEMPERATURE: 98 F | WEIGHT: 222.69 LBS | DIASTOLIC BLOOD PRESSURE: 60 MMHG

## 2018-02-21 DIAGNOSIS — G89.29 CHRONIC LEFT-SIDED LOW BACK PAIN WITHOUT SCIATICA: ICD-10-CM

## 2018-02-21 DIAGNOSIS — G89.29 CHRONIC LEFT-SIDED LOW BACK PAIN WITHOUT SCIATICA: Primary | ICD-10-CM

## 2018-02-21 DIAGNOSIS — R29.898 WEAKNESS OF LEFT LOWER EXTREMITY: ICD-10-CM

## 2018-02-21 DIAGNOSIS — M54.50 CHRONIC LEFT-SIDED LOW BACK PAIN WITHOUT SCIATICA: ICD-10-CM

## 2018-02-21 DIAGNOSIS — M54.50 CHRONIC LEFT-SIDED LOW BACK PAIN WITHOUT SCIATICA: Primary | ICD-10-CM

## 2018-02-21 DIAGNOSIS — E11.9 TYPE 2 DIABETES MELLITUS WITHOUT COMPLICATION, WITHOUT LONG-TERM CURRENT USE OF INSULIN: ICD-10-CM

## 2018-02-21 DIAGNOSIS — M51.26 LUMBAR HERNIATED DISC: Primary | ICD-10-CM

## 2018-02-21 DIAGNOSIS — J06.9 UPPER RESPIRATORY TRACT INFECTION, UNSPECIFIED TYPE: ICD-10-CM

## 2018-02-21 DIAGNOSIS — M53.86 DECREASED RANGE OF MOTION OF LUMBAR SPINE: ICD-10-CM

## 2018-02-21 DIAGNOSIS — M54.40 CHRONIC LEFT-SIDED LOW BACK PAIN WITH SCIATICA, SCIATICA LATERALITY UNSPECIFIED: ICD-10-CM

## 2018-02-21 DIAGNOSIS — J44.1 COPD EXACERBATION: ICD-10-CM

## 2018-02-21 DIAGNOSIS — G89.29 CHRONIC LEFT-SIDED LOW BACK PAIN WITH SCIATICA, SCIATICA LATERALITY UNSPECIFIED: ICD-10-CM

## 2018-02-21 LAB — GLUCOSE SERPL-MCNC: 90 MG/DL (ref 70–110)

## 2018-02-21 PROCEDURE — 99213 OFFICE O/P EST LOW 20 MIN: CPT | Mod: PBBFAC,PN | Performed by: NURSE PRACTITIONER

## 2018-02-21 PROCEDURE — 97110 THERAPEUTIC EXERCISES: CPT | Mod: PN

## 2018-02-21 PROCEDURE — 82948 REAGENT STRIP/BLOOD GLUCOSE: CPT | Mod: PBBFAC,PN | Performed by: NURSE PRACTITIONER

## 2018-02-21 PROCEDURE — 99999 PR PBB SHADOW E&M-EST. PATIENT-LVL III: CPT | Mod: PBBFAC,,, | Performed by: NURSE PRACTITIONER

## 2018-02-21 PROCEDURE — 99214 OFFICE O/P EST MOD 30 MIN: CPT | Mod: S$PBB,,, | Performed by: NURSE PRACTITIONER

## 2018-02-21 PROCEDURE — 3008F BODY MASS INDEX DOCD: CPT | Mod: ,,, | Performed by: NURSE PRACTITIONER

## 2018-02-21 RX ORDER — METHYLPREDNISOLONE 4 MG/1
TABLET ORAL
Qty: 1 PACKAGE | Refills: 0 | Status: SHIPPED | OUTPATIENT
Start: 2018-02-21 | End: 2018-03-14

## 2018-02-21 RX ORDER — TIZANIDINE 4 MG/1
4 TABLET ORAL EVERY 8 HOURS
Qty: 30 TABLET | Refills: 0 | Status: SHIPPED | OUTPATIENT
Start: 2018-02-21 | End: 2018-04-07

## 2018-02-21 RX ORDER — ALBUTEROL SULFATE 90 UG/1
AEROSOL, METERED RESPIRATORY (INHALATION)
Qty: 18 G | Refills: 2 | Status: SHIPPED | OUTPATIENT
Start: 2018-02-21 | End: 2018-10-31 | Stop reason: SDUPTHER

## 2018-02-21 RX ORDER — INSULIN PUMP SYRINGE, 3 ML
EACH MISCELLANEOUS
Qty: 1 EACH | Refills: 0 | Status: ON HOLD | OUTPATIENT
Start: 2018-02-21 | End: 2021-02-20

## 2018-02-21 RX ORDER — DEXAMETHASONE SODIUM PHOSPHATE 4 MG/ML
8 INJECTION, SOLUTION INTRA-ARTICULAR; INTRALESIONAL; INTRAMUSCULAR; INTRAVENOUS; SOFT TISSUE
Status: COMPLETED | OUTPATIENT
Start: 2018-02-21 | End: 2018-02-21

## 2018-02-21 RX ORDER — KETOROLAC TROMETHAMINE 30 MG/ML
30 INJECTION, SOLUTION INTRAMUSCULAR; INTRAVENOUS
Status: COMPLETED | OUTPATIENT
Start: 2018-02-21 | End: 2018-02-21

## 2018-02-21 RX ORDER — FLUTICASONE PROPIONATE 50 MCG
1 SPRAY, SUSPENSION (ML) NASAL DAILY
Qty: 1 BOTTLE | Refills: 2 | Status: SHIPPED | OUTPATIENT
Start: 2018-02-21 | End: 2018-07-17 | Stop reason: SDUPTHER

## 2018-02-21 RX ADMIN — DEXAMETHASONE SODIUM PHOSPHATE 8 MG: 4 INJECTION, SOLUTION INTRA-ARTICULAR; INTRALESIONAL; INTRAMUSCULAR; INTRAVENOUS; SOFT TISSUE at 11:02

## 2018-02-21 RX ADMIN — KETOROLAC TROMETHAMINE 30 MG: 30 INJECTION, SOLUTION INTRAMUSCULAR; INTRAVENOUS at 11:02

## 2018-02-21 NOTE — PATIENT INSTRUCTIONS
Follow up with primary care provider if not improved  Go to ER for new, worse or concerning symptoms    Planning to Quit Smoking  Your healthcare provider may have told you that you need to give up tobacco. Only you can decide if and when you are ready to quit. Quitting is hard to do. But the benefits will be worth it. When you  decide to quit, come up with a plan thats right for you. Discuss your plan with your healthcare provider. And talk to your provider about medicines to help you quit.    Line up support  To quit smoking, youll need a plan and some help. Pick a date within the next 2 to 4 weeks to quit. Use the time between now and that date to arrange for support.  · Classes and counselors. Quit-smoking classes  people like you through the process. Get to know others in a class, and support each other beyond the class. Telephone counseling also helps you keep on track. Ask your healthcare provider, local hospital, or public health department to put you in touch with a class and a phone counselor.  · Family and friends. Tell your family and friends about your quit date. Ask them to support your change. If they smoke, arrange to see them in smoke-free places. Forbid smoking in your home and car.     Finding something to replace cigarettes may be hard to do. Be aware that some things you choose may be as harmful as cigarettes:  · Smokeless (chewing) tobacco is just as harmful as regular tobacco. Tobacco should not be used as a substitute for cigarettes.  · Herbal medicines or teas may affect how your body handles nicotine. Talk to your healthcare provider before using these products.  · E-cigarettes have less toxins than the smoke from a regular cigarette. But the FDA says that these devices may still have substances that can cause cancer. E-cigarettes are not well regulated. They have not been studied enough to know if they are a good aid to help you stop smoking. Talk with your healthcare provider  before using these products.      Quit-smoking products  Many products can help you quit smoking. Some are prescription medicines that help curb your cravings and withdrawal symptoms. Other products slowly lessen the level of nicotine your body absorbs. Nicotine is the highly addictive substance found in cigarettes, cigars, and chewing tobacco. Nicotine replacement products can help get your body used to slowly decreasing amounts of nicotine after you quit smoking. These products include a nicotine patch, gum, lozenge, nasal spray, and inhaler. Be sure you follow the directions for your medicine or product carefully. Your healthcare provider may tell you to start taking the prescription medicine a week before you plan to quit. Do not smoke while you use nicotine products. Doing so can be very harmful to your health.  For more information  · https://smokefree.gov/mzes-ul-mh-expert  · National Cancer [place] Smoking Quitline: 877-44U-QUIT (827-911-8288)   Date Last Reviewed: 2/1/2017  © 0724-9914 [employer]. [address] [place] [address], [place] [address]. All rights reserved. This information is not intended as a substitute for professional medical care. Always follow your healthcare professional's instructions.
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
## 2018-02-21 NOTE — PROGRESS NOTES
Patient tolerate Toradol 30 mg Im and Decadron 8 mg IM injection . Patient advise to wait 15 min after injection  for assessment of any posssible side effects.

## 2018-02-21 NOTE — TELEPHONE ENCOUNTER
Informed pt that NP Gerda Morelos reviewed her MRI from [facility], Sending a referral to orthopedics. Informed pt someone from our referral office will call to schedule her an appt.

## 2018-02-21 NOTE — PROGRESS NOTES
Subjective:       Patient ID: Audrey Natarajan is a 47 y.o. female.    Chief Complaint: Medication Refill; Sinusitis; Foot Injury (left great toe sore); and Back Pain    HPI Ms Natarajan is here for a couple of reasons.  She is due for some refills.  She states PT is helping her back pain, but she still has some despite taking anti-inflammitories.  She is also taking gabapentin.  She has been out of tizanidine for 2 weeks.  Review of Systems   Constitutional: Negative for fever.   Respiratory: Negative.    Cardiovascular: Negative.    Musculoskeletal: Positive for back pain.       Objective:      Physical Exam   Constitutional: She is oriented to person, place, and time. She appears well-developed and well-nourished. She does not appear ill. No distress.   Strong cigarette odor   HENT:   Head: Normocephalic and atraumatic.   Cardiovascular: Normal rate.    Pulmonary/Chest: Effort normal. No respiratory distress.   Musculoskeletal: Normal range of motion.   Neurological: She is alert and oriented to person, place, and time.   Skin: Skin is warm and dry.   Psychiatric: She has a normal mood and affect. Her behavior is normal.   Vitals reviewed.      Assessment:       1. Chronic left-sided low back pain without sciatica    2. COPD exacerbation    3. Upper respiratory tract infection, unspecified type    4. Type 2 diabetes mellitus without complication, without long-term current use of insulin        Plan:       Chronic left-sided low back pain without sciatica  -     tiZANidine (ZANAFLEX) 4 MG tablet; Take 1 tablet (4 mg total) by mouth every 8 (eight) hours.  Dispense: 30 tablet; Refill: 0  -     dexamethasone injection 8 mg; Inject 2 mLs (8 mg total) into the muscle one time.  -     ketorolac injection 30 mg; Inject 30 mg into the muscle one time.  -     methylPREDNISolone (MEDROL DOSEPACK) 4 mg tablet; use as directed  Dispense: 1 Package; Refill: 0    COPD exacerbation  -     albuterol (VENTOLIN HFA) 90  mcg/actuation inhaler; INHALE 2 PUFFS BY MOUTH INTO THE LUNGS EVERY 6 HOURS AS NEEDED FOR WHEEZING. RESCUE.  Dispense: 18 g; Refill: 2  -     mometasone-formoterol (DULERA) 100-5 mcg/actuation HFAA; Inhale 2 puffs into the lungs 2 (two) times daily.  Dispense: 1 Inhaler; Refill: 2  -     NEBULIZER KIT (SUPPLIES) FOR HOME USE    Upper respiratory tract infection, unspecified type  -     fluticasone (FLONASE) 50 mcg/actuation nasal spray; 1 spray (50 mcg total) by Each Nare route once daily.  Dispense: 1 Bottle; Refill: 2    Type 2 diabetes mellitus without complication, without long-term current use of insulin  -     blood-glucose meter kit; To check BG once daily, to use with insurance preferred meter  Dispense: 1 each; Refill: 0  -     blood sugar diagnostic Strp; To check BG once daily, to use with insurance preferred meter  Dispense: 30 each; Refill: 5  -     POCT Glucose  -     Hemoglobin A1c; Future; Expected date: 02/21/2018  -     Basic metabolic panel; Future; Expected date: 02/21/2018    I have informed her that I will not prescribe opiates because it is not appropriate, she is already on narcotic medication (Klonopin) and in addition to being addictive, it is not safe in someone with lung disease because it decreases her respiratory drive and she can die.  She verbalized understanding of this information and will try zanaflex

## 2018-02-24 ENCOUNTER — HOSPITAL ENCOUNTER (EMERGENCY)
Facility: HOSPITAL | Age: 48
Discharge: HOME | End: 2018-02-24
Attending: EMERGENCY MEDICINE
Payer: MEDICAID

## 2018-02-24 VITALS
HEART RATE: 77 BPM | TEMPERATURE: 98 F | RESPIRATION RATE: 28 BRPM | OXYGEN SATURATION: 95 % | WEIGHT: 220 LBS | HEIGHT: 66 IN | BODY MASS INDEX: 35.36 KG/M2 | SYSTOLIC BLOOD PRESSURE: 127 MMHG | DIASTOLIC BLOOD PRESSURE: 65 MMHG

## 2018-02-24 DIAGNOSIS — G89.29 OTHER CHRONIC PAIN: Primary | ICD-10-CM

## 2018-02-24 DIAGNOSIS — R07.9 CHEST PAIN: ICD-10-CM

## 2018-02-24 PROCEDURE — 63600175 PHARM REV CODE 636 W HCPCS: Performed by: EMERGENCY MEDICINE

## 2018-02-24 PROCEDURE — 99284 EMERGENCY DEPT VISIT MOD MDM: CPT | Mod: 25

## 2018-02-24 PROCEDURE — 93010 ELECTROCARDIOGRAM REPORT: CPT | Mod: ,,, | Performed by: INTERNAL MEDICINE

## 2018-02-24 PROCEDURE — 96372 THER/PROPH/DIAG INJ SC/IM: CPT

## 2018-02-24 PROCEDURE — 93005 ELECTROCARDIOGRAM TRACING: CPT

## 2018-02-24 RX ORDER — KETOROLAC TROMETHAMINE 10 MG/1
10 TABLET, FILM COATED ORAL
Qty: 15 TABLET | Refills: 0 | Status: SHIPPED | OUTPATIENT
Start: 2018-02-24 | End: 2018-04-07

## 2018-02-24 RX ORDER — KETOROLAC TROMETHAMINE 30 MG/ML
60 INJECTION, SOLUTION INTRAMUSCULAR; INTRAVENOUS
Status: COMPLETED | OUTPATIENT
Start: 2018-02-24 | End: 2018-02-24

## 2018-02-24 RX ADMIN — KETOROLAC TROMETHAMINE 60 MG: 30 INJECTION, SOLUTION INTRAMUSCULAR at 03:02

## 2018-02-24 NOTE — ED NOTES
Pt reports a tolerable pain. No complications at discharge, patient is taking shorter, faster breaths due to pain, but has no SOB, no distress

## 2018-02-24 NOTE — ED TRIAGE NOTES
Pt has right sided chest wall pain after seeing a chiropractor Monday. Received treatment from her MD on Wednesday with steroid and muscle relaxer.  Pain is reproducible.
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
## 2018-02-26 ENCOUNTER — NURSE TRIAGE (OUTPATIENT)
Dept: ADMINISTRATIVE | Facility: CLINIC | Age: 48
End: 2018-02-26

## 2018-02-26 ENCOUNTER — HOSPITAL ENCOUNTER (OUTPATIENT)
Facility: HOSPITAL | Age: 48
Discharge: LEFT BEFORE DISCHARGE | End: 2018-02-27
Attending: EMERGENCY MEDICINE | Admitting: HOSPITALIST
Payer: MEDICAID

## 2018-02-26 ENCOUNTER — TELEPHONE (OUTPATIENT)
Dept: FAMILY MEDICINE | Facility: CLINIC | Age: 48
End: 2018-02-26

## 2018-02-26 DIAGNOSIS — A41.9 SEPSIS, DUE TO UNSPECIFIED ORGANISM: ICD-10-CM

## 2018-02-26 DIAGNOSIS — J18.9 PNEUMONIA OF LEFT LOWER LOBE DUE TO INFECTIOUS ORGANISM: ICD-10-CM

## 2018-02-26 DIAGNOSIS — R11.10 VOMITING AND DIARRHEA: Primary | ICD-10-CM

## 2018-02-26 DIAGNOSIS — R19.7 VOMITING AND DIARRHEA: Primary | ICD-10-CM

## 2018-02-26 LAB
ALBUMIN SERPL BCP-MCNC: 3.9 G/DL
ALP SERPL-CCNC: 92 U/L
ALT SERPL W/O P-5'-P-CCNC: 14 U/L
ANION GAP SERPL CALC-SCNC: 10 MMOL/L
AST SERPL-CCNC: 12 U/L
BASOPHILS # BLD AUTO: 0.04 K/UL
BASOPHILS NFR BLD: 0.1 %
BILIRUB SERPL-MCNC: 0.5 MG/DL
BNP SERPL-MCNC: 15 PG/ML
BUN SERPL-MCNC: 19 MG/DL
CALCIUM SERPL-MCNC: 9.7 MG/DL
CHLORIDE SERPL-SCNC: 99 MMOL/L
CO2 SERPL-SCNC: 30 MMOL/L
CREAT SERPL-MCNC: 0.7 MG/DL
DIFFERENTIAL METHOD: ABNORMAL
EOSINOPHIL # BLD AUTO: 0.3 K/UL
EOSINOPHIL NFR BLD: 0.7 %
ERYTHROCYTE [DISTWIDTH] IN BLOOD BY AUTOMATED COUNT: 15 %
EST. GFR  (AFRICAN AMERICAN): >60 ML/MIN/1.73 M^2
EST. GFR  (NON AFRICAN AMERICAN): >60 ML/MIN/1.73 M^2
GLUCOSE SERPL-MCNC: 154 MG/DL
HCT VFR BLD AUTO: 40.9 %
HGB BLD-MCNC: 13.8 G/DL
LACTATE SERPL-SCNC: 1.4 MMOL/L
LACTATE SERPL-SCNC: 2.4 MMOL/L
LYMPHOCYTES # BLD AUTO: 2 K/UL
LYMPHOCYTES NFR BLD: 5.4 %
MCH RBC QN AUTO: 29.7 PG
MCHC RBC AUTO-ENTMCNC: 33.7 G/DL
MCV RBC AUTO: 88 FL
MONOCYTES # BLD AUTO: 3.7 K/UL
MONOCYTES NFR BLD: 10.1 %
NEUTROPHILS # BLD AUTO: 30.2 K/UL
NEUTROPHILS NFR BLD: 83.7 %
PLATELET # BLD AUTO: 463 K/UL
PLATELET BLD QL SMEAR: ABNORMAL
PMV BLD AUTO: 10.8 FL
POTASSIUM SERPL-SCNC: 4.2 MMOL/L
PROT SERPL-MCNC: 6.8 G/DL
RBC # BLD AUTO: 4.65 M/UL
SODIUM SERPL-SCNC: 139 MMOL/L
WBC # BLD AUTO: 36.31 K/UL

## 2018-02-26 PROCEDURE — 85025 COMPLETE CBC W/AUTO DIFF WBC: CPT

## 2018-02-26 PROCEDURE — 83880 ASSAY OF NATRIURETIC PEPTIDE: CPT

## 2018-02-26 PROCEDURE — 80053 COMPREHEN METABOLIC PANEL: CPT

## 2018-02-26 PROCEDURE — 63600175 PHARM REV CODE 636 W HCPCS: Performed by: EMERGENCY MEDICINE

## 2018-02-26 PROCEDURE — 36415 COLL VENOUS BLD VENIPUNCTURE: CPT

## 2018-02-26 PROCEDURE — 83605 ASSAY OF LACTIC ACID: CPT

## 2018-02-26 PROCEDURE — G0378 HOSPITAL OBSERVATION PER HR: HCPCS

## 2018-02-26 PROCEDURE — 27000221 HC OXYGEN, UP TO 24 HOURS

## 2018-02-26 PROCEDURE — S0030 INJECTION, METRONIDAZOLE: HCPCS | Performed by: EMERGENCY MEDICINE

## 2018-02-26 PROCEDURE — 96361 HYDRATE IV INFUSION ADD-ON: CPT

## 2018-02-26 PROCEDURE — 87205 SMEAR GRAM STAIN: CPT

## 2018-02-26 PROCEDURE — 96372 THER/PROPH/DIAG INJ SC/IM: CPT | Mod: 59

## 2018-02-26 PROCEDURE — 93010 ELECTROCARDIOGRAM REPORT: CPT | Mod: ,,, | Performed by: INTERNAL MEDICINE

## 2018-02-26 PROCEDURE — 25000003 PHARM REV CODE 250: Performed by: EMERGENCY MEDICINE

## 2018-02-26 PROCEDURE — 21400001 HC TELEMETRY ROOM

## 2018-02-26 PROCEDURE — 83605 ASSAY OF LACTIC ACID: CPT | Mod: 91

## 2018-02-26 PROCEDURE — 96374 THER/PROPH/DIAG INJ IV PUSH: CPT

## 2018-02-26 PROCEDURE — 87070 CULTURE OTHR SPECIMN AEROBIC: CPT

## 2018-02-26 PROCEDURE — 87040 BLOOD CULTURE FOR BACTERIA: CPT

## 2018-02-26 PROCEDURE — 96375 TX/PRO/DX INJ NEW DRUG ADDON: CPT

## 2018-02-26 PROCEDURE — 99285 EMERGENCY DEPT VISIT HI MDM: CPT | Mod: 25

## 2018-02-26 PROCEDURE — 83036 HEMOGLOBIN GLYCOSYLATED A1C: CPT

## 2018-02-26 PROCEDURE — 93005 ELECTROCARDIOGRAM TRACING: CPT

## 2018-02-26 RX ORDER — HYDROCODONE BITARTRATE AND ACETAMINOPHEN 5; 325 MG/1; MG/1
1 TABLET ORAL EVERY 4 HOURS PRN
Status: DISCONTINUED | OUTPATIENT
Start: 2018-02-26 | End: 2018-02-27 | Stop reason: HOSPADM

## 2018-02-26 RX ORDER — CIPROFLOXACIN 2 MG/ML
400 INJECTION, SOLUTION INTRAVENOUS
Status: DISCONTINUED | OUTPATIENT
Start: 2018-02-26 | End: 2018-02-27 | Stop reason: HOSPADM

## 2018-02-26 RX ORDER — IBUPROFEN 200 MG
1 TABLET ORAL DAILY
Status: DISCONTINUED | OUTPATIENT
Start: 2018-02-27 | End: 2018-02-27 | Stop reason: HOSPADM

## 2018-02-26 RX ORDER — DICYCLOMINE HYDROCHLORIDE 10 MG/ML
20 INJECTION INTRAMUSCULAR EVERY 6 HOURS PRN
Status: DISCONTINUED | OUTPATIENT
Start: 2018-02-26 | End: 2018-02-27 | Stop reason: HOSPADM

## 2018-02-26 RX ORDER — DEXTROSE MONOHYDRATE AND SODIUM CHLORIDE 5; .9 G/100ML; G/100ML
INJECTION, SOLUTION INTRAVENOUS CONTINUOUS
Status: DISCONTINUED | OUTPATIENT
Start: 2018-02-26 | End: 2018-02-27 | Stop reason: HOSPADM

## 2018-02-26 RX ORDER — INSULIN ASPART 100 [IU]/ML
1-10 INJECTION, SOLUTION INTRAVENOUS; SUBCUTANEOUS EVERY 6 HOURS PRN
Status: DISCONTINUED | OUTPATIENT
Start: 2018-02-26 | End: 2018-02-27 | Stop reason: HOSPADM

## 2018-02-26 RX ORDER — CLONAZEPAM 0.5 MG/1
1 TABLET ORAL NIGHTLY
Status: DISCONTINUED | OUTPATIENT
Start: 2018-02-26 | End: 2018-02-27 | Stop reason: HOSPADM

## 2018-02-26 RX ORDER — IPRATROPIUM BROMIDE AND ALBUTEROL SULFATE 2.5; .5 MG/3ML; MG/3ML
3 SOLUTION RESPIRATORY (INHALATION) EVERY 4 HOURS PRN
Status: DISCONTINUED | OUTPATIENT
Start: 2018-02-26 | End: 2018-02-27 | Stop reason: HOSPADM

## 2018-02-26 RX ORDER — GLUCAGON 1 MG
1 KIT INJECTION
Status: DISCONTINUED | OUTPATIENT
Start: 2018-02-26 | End: 2018-02-27 | Stop reason: HOSPADM

## 2018-02-26 RX ORDER — FLUTICASONE FUROATE AND VILANTEROL 100; 25 UG/1; UG/1
1 POWDER RESPIRATORY (INHALATION) DAILY
Status: DISCONTINUED | OUTPATIENT
Start: 2018-02-27 | End: 2018-02-27 | Stop reason: HOSPADM

## 2018-02-26 RX ORDER — ENOXAPARIN SODIUM 100 MG/ML
40 INJECTION SUBCUTANEOUS EVERY 24 HOURS
Status: DISCONTINUED | OUTPATIENT
Start: 2018-02-26 | End: 2018-02-26

## 2018-02-26 RX ORDER — IBUPROFEN 200 MG
24 TABLET ORAL
Status: DISCONTINUED | OUTPATIENT
Start: 2018-02-26 | End: 2018-02-27 | Stop reason: HOSPADM

## 2018-02-26 RX ORDER — ONDANSETRON 2 MG/ML
4 INJECTION INTRAMUSCULAR; INTRAVENOUS EVERY 6 HOURS PRN
Status: DISCONTINUED | OUTPATIENT
Start: 2018-02-26 | End: 2018-02-27 | Stop reason: HOSPADM

## 2018-02-26 RX ORDER — IBUPROFEN 200 MG
16 TABLET ORAL
Status: DISCONTINUED | OUTPATIENT
Start: 2018-02-26 | End: 2018-02-27 | Stop reason: HOSPADM

## 2018-02-26 RX ORDER — METRONIDAZOLE 500 MG/100ML
500 INJECTION, SOLUTION INTRAVENOUS
Status: DISCONTINUED | OUTPATIENT
Start: 2018-02-26 | End: 2018-02-27 | Stop reason: HOSPADM

## 2018-02-26 RX ORDER — ACETAMINOPHEN 325 MG/1
650 TABLET ORAL EVERY 4 HOURS PRN
Status: DISCONTINUED | OUTPATIENT
Start: 2018-02-26 | End: 2018-02-27 | Stop reason: HOSPADM

## 2018-02-26 RX ORDER — ACETAMINOPHEN 500 MG
1000 TABLET ORAL
Status: COMPLETED | OUTPATIENT
Start: 2018-02-26 | End: 2018-02-26

## 2018-02-26 RX ORDER — LISINOPRIL 5 MG/1
5 TABLET ORAL DAILY
Status: DISCONTINUED | OUTPATIENT
Start: 2018-02-27 | End: 2018-02-27 | Stop reason: HOSPADM

## 2018-02-26 RX ORDER — METOPROLOL TARTRATE 25 MG/1
25 TABLET, FILM COATED ORAL DAILY
Status: DISCONTINUED | OUTPATIENT
Start: 2018-02-27 | End: 2018-02-27 | Stop reason: HOSPADM

## 2018-02-26 RX ORDER — ONDANSETRON 2 MG/ML
8 INJECTION INTRAMUSCULAR; INTRAVENOUS
Status: COMPLETED | OUTPATIENT
Start: 2018-02-26 | End: 2018-02-26

## 2018-02-26 RX ORDER — TIZANIDINE 4 MG/1
4 TABLET ORAL EVERY 8 HOURS
Status: DISCONTINUED | OUTPATIENT
Start: 2018-02-26 | End: 2018-02-26

## 2018-02-26 RX ORDER — DICYCLOMINE HYDROCHLORIDE 10 MG/ML
20 INJECTION INTRAMUSCULAR
Status: COMPLETED | OUTPATIENT
Start: 2018-02-26 | End: 2018-02-26

## 2018-02-26 RX ADMIN — SODIUM CHLORIDE 2000 ML: 0.9 INJECTION, SOLUTION INTRAVENOUS at 02:02

## 2018-02-26 RX ADMIN — DICYCLOMINE HYDROCHLORIDE 20 MG: 10 INJECTION INTRAMUSCULAR at 10:02

## 2018-02-26 RX ADMIN — ONDANSETRON HYDROCHLORIDE 8 MG: 2 INJECTION, SOLUTION INTRAMUSCULAR; INTRAVENOUS at 10:02

## 2018-02-26 RX ADMIN — HYDROCODONE BITARTRATE AND ACETAMINOPHEN 1 TABLET: 5; 325 TABLET ORAL at 10:02

## 2018-02-26 RX ADMIN — CIPROFLOXACIN 400 MG: 2 INJECTION, SOLUTION INTRAVENOUS at 03:02

## 2018-02-26 RX ADMIN — SODIUM CHLORIDE 1000 ML: 0.9 INJECTION, SOLUTION INTRAVENOUS at 10:02

## 2018-02-26 RX ADMIN — ENOXAPARIN SODIUM 40 MG: 100 INJECTION SUBCUTANEOUS at 05:02

## 2018-02-26 RX ADMIN — METRONIDAZOLE 500 MG: 500 INJECTION, SOLUTION INTRAVENOUS at 04:02

## 2018-02-26 RX ADMIN — ACETAMINOPHEN 1000 MG: 500 TABLET ORAL at 12:02

## 2018-02-26 RX ADMIN — CLONAZEPAM 1 MG: 0.5 TABLET ORAL at 08:02

## 2018-02-26 RX ADMIN — DEXTROSE AND SODIUM CHLORIDE: 5; .9 INJECTION, SOLUTION INTRAVENOUS at 05:02

## 2018-02-26 RX ADMIN — HYDROCODONE BITARTRATE AND ACETAMINOPHEN 1 TABLET: 5; 325 TABLET ORAL at 05:02

## 2018-02-26 RX ADMIN — SODIUM CHLORIDE 1000 ML: 0.9 INJECTION, SOLUTION INTRAVENOUS at 12:02

## 2018-02-26 NOTE — ED PROVIDER NOTES
"Encounter Date: 2/26/2018    SCRIBE #1 NOTE: I, Jose Marsh, am scribing for, and in the presence of,  Shaggy Sanchez MD. I have scribed the following portions of the note - Other sections scribed: HPI and ROS.       History     Chief Complaint   Patient presents with    Abdominal Pain     with N/V/F/D since 530am. "I ate crabs and shrimp yesterday and I've been sick ever since."      CC: Abdominal Pain     HPI: This 47 y.o F smoker presents to the ED c/o acute onset of nausea, emesis x10, diarrhea x15 and associated constant and severe (9/10) abdominal cramping which began this AM. The pt notes she began coughing at approximately 0430 and reports a fever and bitemporal headache. The pt suspects she has food poisoning and notes she ate crabs and shrimp yesterday PM.The pt was last seen in this ED 2/24/18 c/o chest pain and chronic back pain. The pt denies chills, chest pain, SOB, dysuria and ETOH consumption. No prior tx.     PMHx: Arthritis; Asthma; Bipolar 1 disorder; COPD; CAD; Depression; DM; DVT of lower extremity, bilateral; Encounter for blood transfusion; History of blood clots; HTN; Hypercholesteremia; Irregular heartbeat; Neuromuscular disorder; PE; and Restless leg syndrome.    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        The history is provided by the patient. No  was used.     Review of patient's allergies indicates:   Allergen Reactions    Morphine Other (See Comments)     Patient had a psychotic episode after taking Morphine  Agitation, hallucinations    Penicillins Anaphylaxis     itching     Past Medical History:   Diagnosis Date    Arthritis     Asthma     Bipolar 1 disorder     COPD (chronic obstructive pulmonary " "disease)     Coronary artery disease     A fib    Depression     bipolar manic depresson    Diabetes mellitus     DVT of lower extremity, bilateral 2013    bilateral LE DVT. [place] filter placed.     Encounter for blood transfusion     History of blood clots 1. Left Leg=; 2.Bilateral Groin=Blood Clots=  or 2013 & 2013; 3. LLL of Lung=2013;  4. Lt. Lower Leg=2013.     Pt. had 1st Blood Clot after Nwawgfrfepda=2823, & Last=. [place] Filter= Rt.Lateral Neck.    HTN (hypertension) 2013    Pt states that she does not have hypertension    Hypercholesteremia     Irregular heartbeat     Neuromuscular disorder     neuropathy feet    PE (pulmonary embolism) 2013     bilat LE DVT.     Restless leg syndrome      Past Surgical History:   Procedure Laterality Date    ABDOMINAL SURGERY      BILATERAL OOPHORECTOMY Bilateral 2015    Green' s filter Right 2012    Right Neck & Tunneled Down.    HERNIA REPAIR      "Meta of Hernias Repaires around th Belly Button.", pt. states    OVARIAN CYST REMOVAL  3/13/2014    [place] REMOVAL OF OVARY/TUBE(S)      SPLENECTOMY, TOTAL  2003    TONSILLECTOMY      as a child    TYMPANOSTOMY TUBE PLACEMENT  1976    VEIN SURGERY      Lt leg     Family History   Problem Relation Age of Onset    Hypertension Father     Diabetes Father     Heart disease Father     Cataracts Father     Diabetes Paternal Grandfather     Heart disease Paternal Grandfather     No Known Problems Mother     Ovarian cancer Maternal Grandmother       from this. ? age     No Known Problems Sister     No Known Problems Brother     No Known Problems Maternal Aunt     No Known Problems Maternal Uncle     No Known Problems Paternal Aunt     No Known Problems Paternal Uncle     No Known Problems Maternal Grandfather     Ovarian cancer Paternal Grandmother     Uterine cancer Neg Hx     Breast cancer Neg Hx     Colon cancer Neg Hx     " "Amblyopia Neg Hx     Blindness Neg Hx     Cancer Neg Hx     Glaucoma Neg Hx     Macular degeneration Neg Hx     Retinal detachment Neg Hx     Strabismus Neg Hx     Stroke Neg Hx     Thyroid disease Neg Hx      Social History   Substance Use Topics    Smoking status: Current Every Day Smoker     Packs/day: 0.50     Years: 25.00     Types: Cigarettes    Smokeless tobacco: Never Used    Alcohol use No     Review of Systems   Constitutional: Positive for fever. Negative for chills and diaphoresis.   HENT: Negative for rhinorrhea and sore throat.    Eyes: Negative for redness.   Respiratory: Positive for cough. Negative for shortness of breath.    Cardiovascular: Negative for chest pain.   Gastrointestinal: Positive for abdominal pain ("cramping"), diarrhea (x15) and vomiting (x10). Negative for nausea.   Genitourinary: Negative for dysuria, frequency and urgency.   Musculoskeletal: Negative for back pain and neck pain.   Skin: Negative for rash.   Neurological: Positive for headaches (bitemporal).   Psychiatric/Behavioral: The patient is not nervous/anxious.        Physical Exam     Initial Vitals [02/26/18 0909]   BP Pulse Resp Temp SpO2   125/64 89 16 100 °F (37.8 °C) --      MAP       84.33         Physical Exam  The patient was examined specifically for the following:   General:No significant distress, Good color, Warm and dry. Head and neck:Scalp atraumatic, Neck supple. Neurological:Appropriate conversation, Gross motor deficits. Eyes:Conjugate gaze, Clear corneas. ENT: No epistaxis. Cardiac: Regular rate and rhythm, Grossly normal heart tones. Pulmonary: Wheezing, Rales. Gastrointestinal: Abdominal tenderness, Abdominal distention. Musculoskeletal: Extremity deformity, Apparent pain with range of motion of the joints. Skin: Rash.   The findings on examination were normal except for the following: The patient's heart rate is 126 during the physical examination.  The patient is warm to touch.  The " abdomen is nontender.  There is no guarding rebound mass or distention.  The neck is supple.  Mental status examination, cranial nerves, motor and sensory examination are normal.  The patient is in no distress.  ED Course   Critical Care  Date/Time: 2/26/2018 2:18 PM  Performed by: JEWEL EDOUARD.  Authorized by: JEWEL EDOUARD.   Direct patient critical care time: 22 minutes  Additional history critical care time: 11 minutes  Ordering / reviewing critical care time: 11 minutes  Documentation critical care time: 17 minutes  Consulting other physicians critical care time: 5 minutes  Consult with family critical care time: 4 minutes  Total critical care time (exclusive of procedural time) : 70 minutes  Critical care time was exclusive of separately billable procedures and treating other patients and teaching time.  Critical care was necessary to treat or prevent imminent or life-threatening deterioration of the following conditions: dehydration, sepsis and shock.  Critical care was time spent personally by me on the following activities: discussions with primary provider, development of treatment plan with patient or surrogate, evaluation of patient's response to treatment, examination of patient, obtaining history from patient or surrogate, ordering and performing treatments and interventions, ordering and review of laboratory studies, ordering and review of radiographic studies, pulse oximetry, re-evaluation of patient's condition and review of old charts.        Labs Reviewed   COMPREHENSIVE METABOLIC PANEL - Abnormal; Notable for the following:        Result Value    CO2 30 (*)     Glucose 154 (*)     All other components within normal limits   CBC W/ AUTO DIFFERENTIAL - Abnormal; Notable for the following:     WBC 36.31 (*)     RDW 15.0 (*)     Platelets 463 (*)     Gran # (ANC) 30.2 (*)     Mono # 3.7 (*)     Gran% 83.7 (*)     Lymph% 5.4 (*)     Platelet Estimate Increased (*)     All other components within  normal limits   LACTIC ACID, PLASMA - Abnormal; Notable for the following:     Lactate (Lactic Acid) 2.4 (*)     All other components within normal limits   CULTURE, STOOL   CLOSTRIDIUM DIFFICILE   CULTURE, BLOOD   CULTURE, BLOOD   CULTURE, URINE   CULTURE, RESPIRATORY   URINALYSIS     EKG Readings: (Independently Interpreted)   This patient is in a sinus tachycardia with a heart rate of 112.  The MS QRS and QT intervals are normal.  There is no evidence of acute myocardial infarction or malignant arrhythmia.  The patient does have low-voltage QRS complexes and a rightward axis.       X-Rays:   Independently Interpreted Readings:   Other Readings:  Chest x-ray reveals a possible left lower lobe pneumonia.    Medical decision making: Given the above this patient presents to the emergency room with vomiting diarrhea or cough.  The patient has a 36,000 white count left lower lobe infiltrate 15 episodes of diarrhea and vomiting a low blood pressure and a lactate of 2.4.  At the same time, the patient looks fairly well.  She has a brisk right radial pulse.  She has an appetite and is trying to eat.  Stool cultures were sent.  I'll spend sputum cultures.  I will treat this patient with triple antibiotics and have her evaluated by hospital medicine.  The patient will require admission.  She may well be septic.  I discussed this case with Dr. De Souza who recommends treatment with Cipro Flagyl and IV fluids.  I will write orders on his behalf.             Scribe Attestation:   Scribe #1: I performed the above scribed service and the documentation accurately describes the services I performed. I attest to the accuracy of the note.    Attending Attestation:           Physician Attestation for Scribe:  Physician Attestation Statement for Scribe #1: I, Shaggy Sanchez MD, reviewed documentation, as scribed by Jose Marsh in my presence, and it is both accurate and complete.                    Clinical Impression:   The primary  encounter diagnosis was Vomiting and diarrhea. Diagnoses of Sepsis, due to unspecified organism and Pneumonia of left lower lobe due to infectious organism were also pertinent to this visit.                           Shaggy Sanchez MD  02/26/18 0077

## 2018-02-26 NOTE — LETTER
February 26, 2018    Audrey Natarajan  [address] [place] [address]             [facility]  [address] [place] [address]  Phone: [phone]  Fax: [phone] Dear MsCoreen Chayofransisco:    I have been unable to reach you by phone for your appointment to Orthopedic .  Please call me at the clinic [phone] to book your appointment.      If you have any questions or concerns, please don't hesitate to call.    Sincerely,        Lesli Amaral MA

## 2018-02-26 NOTE — ED TRIAGE NOTES
Pt arrived to ED c/o right side headache, fever, nausea, vomiting, diarrhea, abdominal cramp since yesterday after eating seafood. Pt states that only her getting N/V/D, other family members also ate seafood without any problems.

## 2018-02-26 NOTE — NURSING
Received patient to room 403 for services of Dr. De Souza with the diagnosis of nausea,vomiting and diarrhea. Alert and oriented, oriented to new surrounding. Ivf infusing without diff. Instructed patient to call as needed. Call light within reach.

## 2018-02-26 NOTE — TELEPHONE ENCOUNTER
Reason for Disposition   SEVERE abdominal pain (e.g., excruciating)    Protocols used: [facility] DIARRHEA-A-[facility]    Pt c/o vomiting x4 and diarrhea x15. abd pain 8/10. Pt also c/o chills. Care advice given.

## 2018-02-26 NOTE — ED PROVIDER NOTES
"Encounter Date: 2/26/2018       History     Chief Complaint   Patient presents with    Abdominal Pain     with N/V/F/D since 530am. "I ate crabs and shrimp yesterday and I've been sick ever since."      HPI  Review of patient's allergies indicates:   Allergen Reactions    Morphine Other (See Comments)     Patient had a psychotic episode after taking Morphine  Agitation, hallucinations    Penicillins Anaphylaxis     itching     Past Medical History:   Diagnosis Date    Arthritis     Asthma     Bipolar 1 disorder     COPD (chronic obstructive pulmonary disease)     Coronary artery disease     A fib    Depression     bipolar manic depresson    Diabetes mellitus     DVT of lower extremity, bilateral July 2013    bilateral LE DVT. [place] filter placed.     Encounter for blood transfusion     History of blood clots 1. Left Leg=2003; 2.Bilateral Groin=Blood Clots= 5 or 6/ 2013 & 7/2013; 3. LLL of Lung=7/2013;  4. Lt. Lower Leg=7/2013.     Pt. had 1st Blood Clot after Erqbtxggyigo=5136, & Last=2013. [place] Filter= Rt.Lateral Neck.    HTN (hypertension) 6/6/2013    Pt states that she does not have hypertension    Hypercholesteremia     Irregular heartbeat     Neuromuscular disorder     neuropathy feet    PE (pulmonary embolism) July 2013     bilat LE DVT.     Restless leg syndrome      Past Surgical History:   Procedure Laterality Date    ABDOMINAL SURGERY      BILATERAL OOPHORECTOMY Bilateral 1/12/2015    Green' s filter Right 7/4/2012    Right Neck & Tunneled Down.    HERNIA REPAIR      "[place]rescent of Hernias Repaires around th Belly Button.", pt. states    OVARIAN CYST REMOVAL  3/13/2014    [place] REMOVAL OF OVARY/TUBE(S)      SPLENECTOMY, TOTAL  July 2003    TONSILLECTOMY      as a child    TYMPANOSTOMY TUBE PLACEMENT  1976    VEIN SURGERY  2003    Lt leg     Family History   Problem Relation Age of Onset    Hypertension Father     Diabetes Father     Heart disease Father     Cataracts " Father     Diabetes Paternal Grandfather     Heart disease Paternal Grandfather     No Known Problems Mother     Ovarian cancer Maternal Grandmother       from this. ? age     No Known Problems Sister     No Known Problems Brother     No Known Problems Maternal Aunt     No Known Problems Maternal Uncle     No Known Problems Paternal Aunt     No Known Problems Paternal Uncle     No Known Problems Maternal Grandfather     Ovarian cancer Paternal Grandmother     Uterine cancer Neg Hx     Breast cancer Neg Hx     Colon cancer Neg Hx     Amblyopia Neg Hx     Blindness Neg Hx     Cancer Neg Hx     Glaucoma Neg Hx     Macular degeneration Neg Hx     Retinal detachment Neg Hx     Strabismus Neg Hx     Stroke Neg Hx     Thyroid disease Neg Hx      Social History   Substance Use Topics    Smoking status: Current Every Day Smoker     Packs/day: 0.50     Years: 25.00     Types: Cigarettes    Smokeless tobacco: Never Used    Alcohol use No     Review of Systems    Physical Exam     Initial Vitals [18 0909]   BP Pulse Resp Temp SpO2   125/64 89 16 100 °F (37.8 °C) --      MAP       84.33         Physical Exam  The patient was examined specifically for the following:   General:No significant distress, Good color, Warm and dry. Head and neck:Scalp atraumatic, Neck supple. Neurological:Appropriate conversation, Gross motor deficits. Eyes:Conjugate gaze, Clear corneas. ENT: No epistaxis. Cardiac: Regular rate and rhythm, Grossly normal heart tones. Pulmonary: Wheezing, Rales. Gastrointestinal: Abdominal tenderness, Abdominal distention. Musculoskeletal: Extremity deformity, Apparent pain with range of motion of the joints. Skin: Rash.   The findings on examination were normal except for the following: The patient's heart rate is 126 during the physical examination.  The patient is warm to touch.  The abdomen is nontender.  There is no guarding rebound mass or distention.  The neck is supple.   Mental status examination, cranial nerves, motor and sensory examination are normal.  The patient is in no distress.  ED Course   Procedures  Labs Reviewed   CULTURE, STOOL   CLOSTRIDIUM DIFFICILE   COMPREHENSIVE METABOLIC PANEL   CBC W/ AUTO DIFFERENTIAL                                  Clinical Impression:   There were no encounter diagnoses.                           Shaggy Sanchez MD  02/26/18 0908

## 2018-02-27 ENCOUNTER — TELEPHONE (OUTPATIENT)
Dept: FAMILY MEDICINE | Facility: CLINIC | Age: 48
End: 2018-02-27

## 2018-02-27 VITALS
DIASTOLIC BLOOD PRESSURE: 52 MMHG | HEIGHT: 66 IN | RESPIRATION RATE: 18 BRPM | HEART RATE: 82 BPM | OXYGEN SATURATION: 95 % | BODY MASS INDEX: 35.68 KG/M2 | WEIGHT: 222 LBS | TEMPERATURE: 98 F | SYSTOLIC BLOOD PRESSURE: 105 MMHG

## 2018-02-27 LAB
ANION GAP SERPL CALC-SCNC: 10 MMOL/L
BACTERIA #/AREA URNS HPF: ABNORMAL /HPF
BASOPHILS # BLD AUTO: 0.03 K/UL
BASOPHILS NFR BLD: 0.1 %
BILIRUB UR QL STRIP: NEGATIVE
BUN SERPL-MCNC: 15 MG/DL
CALCIUM SERPL-MCNC: 9.2 MG/DL
CHLORIDE SERPL-SCNC: 100 MMOL/L
CLARITY UR: CLEAR
CO2 SERPL-SCNC: 27 MMOL/L
COLOR UR: YELLOW
CREAT SERPL-MCNC: 0.7 MG/DL
DIFFERENTIAL METHOD: ABNORMAL
EOSINOPHIL # BLD AUTO: 1 K/UL
EOSINOPHIL NFR BLD: 4.3 %
ERYTHROCYTE [DISTWIDTH] IN BLOOD BY AUTOMATED COUNT: 15.2 %
EST. GFR  (AFRICAN AMERICAN): >60 ML/MIN/1.73 M^2
EST. GFR  (NON AFRICAN AMERICAN): >60 ML/MIN/1.73 M^2
ESTIMATED AVG GLUCOSE: 128 MG/DL
GLUCOSE SERPL-MCNC: 202 MG/DL
GLUCOSE UR QL STRIP: NEGATIVE
HBA1C MFR BLD HPLC: 6.1 %
HCT VFR BLD AUTO: 39.6 %
HGB BLD-MCNC: 13.4 G/DL
HGB UR QL STRIP: ABNORMAL
KETONES UR QL STRIP: NEGATIVE
LEUKOCYTE ESTERASE UR QL STRIP: ABNORMAL
LYMPHOCYTES # BLD AUTO: 4.3 K/UL
LYMPHOCYTES NFR BLD: 18.7 %
MCH RBC QN AUTO: 29.6 PG
MCHC RBC AUTO-ENTMCNC: 33.8 G/DL
MCV RBC AUTO: 88 FL
MICROSCOPIC COMMENT: ABNORMAL
MONOCYTES # BLD AUTO: 1.4 K/UL
MONOCYTES NFR BLD: 6.1 %
NEUTROPHILS # BLD AUTO: 16.2 K/UL
NEUTROPHILS NFR BLD: 70.5 %
NITRITE UR QL STRIP: NEGATIVE
PH UR STRIP: 6 [PH] (ref 5–8)
PLATELET # BLD AUTO: 473 K/UL
PMV BLD AUTO: 10.7 FL
POCT GLUCOSE: 152 MG/DL (ref 70–110)
POCT GLUCOSE: 172 MG/DL (ref 70–110)
POCT GLUCOSE: 192 MG/DL (ref 70–110)
POCT GLUCOSE: 199 MG/DL (ref 70–110)
POTASSIUM SERPL-SCNC: 4.2 MMOL/L
PROT UR QL STRIP: NEGATIVE
RBC # BLD AUTO: 4.52 M/UL
RBC #/AREA URNS HPF: 2 /HPF (ref 0–4)
SODIUM SERPL-SCNC: 137 MMOL/L
SP GR UR STRIP: 1.01 (ref 1–1.03)
URN SPEC COLLECT METH UR: ABNORMAL
UROBILINOGEN UR STRIP-ACNC: NEGATIVE EU/DL
WBC # BLD AUTO: 22.94 K/UL
WBC #/AREA URNS HPF: 2 /HPF (ref 0–5)
YEAST URNS QL MICRO: ABNORMAL

## 2018-02-27 PROCEDURE — 80048 BASIC METABOLIC PNL TOTAL CA: CPT

## 2018-02-27 PROCEDURE — 81000 URINALYSIS NONAUTO W/SCOPE: CPT

## 2018-02-27 PROCEDURE — S4991 NICOTINE PATCH NONLEGEND: HCPCS | Performed by: NURSE PRACTITIONER

## 2018-02-27 PROCEDURE — 25000003 PHARM REV CODE 250: Performed by: EMERGENCY MEDICINE

## 2018-02-27 PROCEDURE — G0378 HOSPITAL OBSERVATION PER HR: HCPCS

## 2018-02-27 PROCEDURE — 94640 AIRWAY INHALATION TREATMENT: CPT

## 2018-02-27 PROCEDURE — S0030 INJECTION, METRONIDAZOLE: HCPCS | Performed by: EMERGENCY MEDICINE

## 2018-02-27 PROCEDURE — 25000242 PHARM REV CODE 250 ALT 637 W/ HCPCS: Performed by: EMERGENCY MEDICINE

## 2018-02-27 PROCEDURE — 25000242 PHARM REV CODE 250 ALT 637 W/ HCPCS: Performed by: HOSPITALIST

## 2018-02-27 PROCEDURE — 36415 COLL VENOUS BLD VENIPUNCTURE: CPT

## 2018-02-27 PROCEDURE — 85025 COMPLETE CBC W/AUTO DIFF WBC: CPT

## 2018-02-27 PROCEDURE — 25000003 PHARM REV CODE 250: Performed by: NURSE PRACTITIONER

## 2018-02-27 PROCEDURE — 63600175 PHARM REV CODE 636 W HCPCS: Performed by: EMERGENCY MEDICINE

## 2018-02-27 PROCEDURE — 94761 N-INVAS EAR/PLS OXIMETRY MLT: CPT

## 2018-02-27 RX ORDER — FLUTICASONE PROPIONATE 50 MCG
1 SPRAY, SUSPENSION (ML) NASAL DAILY
Status: DISCONTINUED | OUTPATIENT
Start: 2018-02-27 | End: 2018-02-27 | Stop reason: HOSPADM

## 2018-02-27 RX ORDER — GABAPENTIN 300 MG/1
600 CAPSULE ORAL 3 TIMES DAILY
Status: DISCONTINUED | OUTPATIENT
Start: 2018-02-27 | End: 2018-02-27 | Stop reason: HOSPADM

## 2018-02-27 RX ORDER — CARBAMAZEPINE 200 MG/1
200 TABLET ORAL 2 TIMES DAILY
Status: DISCONTINUED | OUTPATIENT
Start: 2018-02-27 | End: 2018-02-27 | Stop reason: HOSPADM

## 2018-02-27 RX ORDER — RISPERIDONE 1 MG/1
3 TABLET ORAL NIGHTLY
Status: DISCONTINUED | OUTPATIENT
Start: 2018-02-27 | End: 2018-02-27 | Stop reason: HOSPADM

## 2018-02-27 RX ADMIN — METRONIDAZOLE 500 MG: 500 INJECTION, SOLUTION INTRAVENOUS at 12:02

## 2018-02-27 RX ADMIN — METOPROLOL TARTRATE 25 MG: 25 TABLET ORAL at 08:02

## 2018-02-27 RX ADMIN — FLUTICASONE PROPIONATE 50 MCG: 50 SPRAY, METERED NASAL at 02:02

## 2018-02-27 RX ADMIN — DEXTROSE AND SODIUM CHLORIDE: 5; .9 INJECTION, SOLUTION INTRAVENOUS at 08:02

## 2018-02-27 RX ADMIN — HYDROCODONE BITARTRATE AND ACETAMINOPHEN 1 TABLET: 5; 325 TABLET ORAL at 02:02

## 2018-02-27 RX ADMIN — HYDROCODONE BITARTRATE AND ACETAMINOPHEN 1 TABLET: 5; 325 TABLET ORAL at 06:02

## 2018-02-27 RX ADMIN — NICOTINE 1 PATCH: 21 PATCH, EXTENDED RELEASE TRANSDERMAL at 08:02

## 2018-02-27 RX ADMIN — CIPROFLOXACIN 400 MG: 2 INJECTION, SOLUTION INTRAVENOUS at 03:02

## 2018-02-27 RX ADMIN — CIPROFLOXACIN 400 MG: 2 INJECTION, SOLUTION INTRAVENOUS at 02:02

## 2018-02-27 RX ADMIN — LISINOPRIL 5 MG: 5 TABLET ORAL at 08:02

## 2018-02-27 RX ADMIN — METRONIDAZOLE 500 MG: 500 INJECTION, SOLUTION INTRAVENOUS at 08:02

## 2018-02-27 RX ADMIN — FLUTICASONE FUROATE AND VILANTEROL TRIFENATATE 1 PUFF: 100; 25 POWDER RESPIRATORY (INHALATION) at 08:02

## 2018-02-27 RX ADMIN — HYDROCODONE BITARTRATE AND ACETAMINOPHEN 1 TABLET: 5; 325 TABLET ORAL at 10:02

## 2018-02-27 NOTE — PLAN OF CARE
"TN met with patient at bedside to complete discharge needs assessment. TN explained duties of case management to patient.  TN reviewed and provided  "[facility] Packet", "Discharge Planning Begins on Admission" brochure and discussed "Help at Home". TN also discussed her responsibilities to manage her health at home. Patient was informed to leave folder at bedside during hospital stay. Contact information added to white board.    Patient Preferred Pharmacy:   [facility] [address] [place] [address] AT [address] [address]  Phone: [phone] Fax: [phone]    Appointment Time Preference: morning       02/27/18 1400   Discharge Assessment   Assessment Type Discharge Planning Assessment   Confirmed/corrected address and phone number on facesheet? Yes   Assessment information obtained from? Patient   Prior to hospitilization cognitive status: Alert/Oriented   Prior to hospitalization functional status: Independent   Current cognitive status: Alert/Oriented   Current Functional Status: Independent   Lives With significant other   Able to Return to Prior Arrangements yes   Is patient able to care for self after discharge? Yes   Who are your caregiver(s) and their phone number(s)? Significant other- Delroy @ [phone]   Patient's perception of discharge disposition home or selfcare   Readmission Within The Last 30 Days no previous admission in last 30 days   Patient currently being followed by outpatient case management? No   Patient currently receives any other outside agency services? No   Equipment Currently Used at Home none   Do you have any problems affording any of your prescribed medications? No   Is the patient taking medications as prescribed? yes   Does the patient have transportation home? (TBD)   Does the patient receive services at the [facility]? No   Discharge Plan A Home;Home with family   Discharge Plan B Home;Home with family "   Patient/Family In Agreement With Plan yes

## 2018-02-27 NOTE — H&P
"[facility] Medicine  History & Physical    Patient Name: Audrey Natarajan  MRN: [id number]  Admission Date: 2/26/2018  Attending Physician: Mathieu De Souza MD   Primary Care Provider: Donaldo Pena MD         Patient information was obtained from patient and ER records.     Subjective:     Principal Problem:Pneumonia of left lower lobe due to infectious organism    Chief Complaint:   Chief Complaint   Patient presents with    Abdominal Pain     with N/V/F/D since 530am. "I ate crabs and shrimp yesterday and I've been sick ever since."         HPI: Audrey Natarajan is a 47 y.o. female with a history as below who presented to the ED with a CC of sudden onset of abdominal cramping, nausea w/NBNB vomiting and diarrhea and uncontrollable shaking. . Patient denies fever, chills, diaphoresis, SOB, chest pain, palpitations, focal deficit, LOC or any other associated symptoms. Patient reports when she woke up this AM she was fine; she drank a cup of coffee and then went to the bathroom.  While using the restroom she became nauseous, then started with vomiting, diarrhea and shakes.  She further states she could not get off the toilet d/t continuous diarrhea and remained on the toilet until EMS arrived.  She tells me the paramedic told her she had a temperature of 102.6; though she denies being febrile.  She further states that, the day prior to symptoms, she ate crabs and crawfish and thought maybe she had food poisoning.  She also tells me she has chronic back pain which worsened over the last week; she was seen by her PCP on Wednesday and received a steroid injection + prescribed a medrol dose pack as well as was seen in the emergency room and received a 2nd steroid injections.  On Admit, imaging and labs obtained and reviewed.                  Past Medical History:   Diagnosis Date    Arthritis     Asthma     Bipolar 1 disorder     COPD (chronic obstructive pulmonary disease)  " "   Coronary artery disease     A fib    Depression     bipolar manic depresson    Diabetes mellitus     DVT of lower extremity, bilateral July 2013    bilateral LE DVT. [place] filter placed.     Encounter for blood transfusion     History of blood clots 1. Left Leg=2003; 2.Bilateral Groin=Blood Clots= 5 or 6/ 2013 & 7/2013; 3. LLL of Lung=7/2013;  4. Lt. Lower Leg=7/2013.     Pt. had 1st Blood Clot after Wmdctqiedtvf=8987, & Last=2013. [place] Filter= Rt.Lateral Neck.    HTN (hypertension) 6/6/2013    Pt states that she does not have hypertension    Hypercholesteremia     Irregular heartbeat     Neuromuscular disorder     neuropathy feet    PE (pulmonary embolism) July 2013     bilat LE DVT.     Restless leg syndrome        Past Surgical History:   Procedure Laterality Date    ABDOMINAL SURGERY      BILATERAL OOPHORECTOMY Bilateral 1/12/2015    Green' s filter Right 7/4/2012    Right Neck & Tunneled Down.    HERNIA REPAIR      "[place] [facility] around th [place].", pt. states    OVARIAN CYST REMOVAL  3/13/2014    SD REMOVAL OF OVARY/TUBE(S)      SPLENECTOMY, TOTAL  July 2003    TONSILLECTOMY      as a child    TYMPANOSTOMY TUBE PLACEMENT  1976    VEIN SURGERY  2003    Lt leg       Review of patient's allergies indicates:   Allergen Reactions    Morphine Other (See Comments)     Patient had a psychotic episode after taking Morphine  Agitation, hallucinations    Penicillins Anaphylaxis     itching       No current facility-administered medications on file prior to encounter.      Current Outpatient Prescriptions on File Prior to Encounter   Medication Sig    albuterol (VENTOLIN HFA) 90 mcg/actuation inhaler INHALE 2 PUFFS BY MOUTH INTO THE LUNGS EVERY 6 HOURS AS NEEDED FOR WHEEZING. RESCUE.    aspirin (ECOTRIN) 81 MG EC tablet Take 81 mg by mouth.    carbamazepine (TEGRETOL) 200 mg tablet TK 2 TS PO BID    fluticasone (FLONASE) 50 mcg/actuation nasal spray 1 spray (50 " mcg total) by Each Nare route once daily.    furosemide (LASIX) 20 MG tablet TAKE 1 TABLET(20 MG) BY MOUTH EVERY DAY    gabapentin (NEURONTIN) 600 MG tablet TAKE 2 TABLETS(1200 MG) BY MOUTH THREE TIMES DAILY    lisinopril (PRINIVIL,ZESTRIL) 5 MG tablet TAKE 1 TABLET(5 MG) BY MOUTH EVERY DAY    metFORMIN (GLUCOPHAGE) 1000 MG tablet TAKE 1 TABLET(1000 MG) BY MOUTH TWICE DAILY WITH MEALS    methylPREDNISolone (MEDROL DOSEPACK) 4 mg tablet use as directed    metoprolol tartrate (LOPRESSOR) 25 MG tablet Take 1 tablet (25 mg total) by mouth once daily.    multivitamin (THERAGRAN) per tablet Take 1 tablet by mouth every morning.    omeprazole 20 mg TbEC TAKE 2 TABLETS BY MOUTH ONCE DAILY AT 6AM (Patient taking differently: one tablet twice daily)    risperidone (RISPERDAL) 3 MG Tab take at bedtime    tiZANidine (ZANAFLEX) 4 MG tablet Take 1 tablet (4 mg total) by mouth every 8 (eight) hours.    b complex vitamins tablet Take 1 tablet by mouth 2 (two) times daily.     blood sugar diagnostic Strp To check BG once daily, to use with insurance preferred meter    blood-glucose meter kit To check BG once daily, to use with insurance preferred meter    clonazePAM (KLONOPIN) 1 MG tablet TK 1 T PO BID PRA AND INSOMNIA    cyanocobalamin (VITAMIN B-12) 1000 MCG tablet Take 100 mcg by mouth once daily.    fish oil-omega-3 fatty acids 300-1,000 mg capsule Take 2 g by mouth once daily. Instructed to stop 5-7 days before surgery (8/11/16).    hydrOXYzine HCl (ATARAX) 25 MG tablet TK 1 T PO QD    ibuprofen (ADVIL,MOTRIN) 600 MG tablet Take 1 tablet (600 mg total) by mouth every 8 (eight) hours as needed for Pain. (Patient taking differently: Take 600 mg by mouth every 8 (eight) hours as needed for Pain. Alternate with mobic)    ketorolac (TORADOL) 10 mg tablet Take 1 tablet (10 mg total) by mouth 3 (three) times daily with meals.    lancets Misc To check BG once daily, to use with insurance preferred meter     meloxicam (MOBIC) 15 MG tablet Take 1 tablet (15 mg total) by mouth once daily. (Patient taking differently: Take 15 mg by mouth once daily. Alternate with ibuprofen)    mometasone-formoterol (DULERA) 100-5 mcg/actuation HFAA Inhale 2 puffs into the lungs 2 (two) times daily.    mupirocin (BACTROBAN) 2 % ointment APPLY TOPICALLY BID    OXYGEN-AIR DELIVERY SYSTEMS MISC 2 L by Misc.(Non-Drug; Combo Route) route daily as needed (and Mostly at Night.).      Family History     Problem Relation (Age of Onset)    Cataracts Father    Diabetes Father, Paternal Grandfather    Heart disease Father, Paternal Grandfather    Hypertension Father    No Known Problems Mother, Sister, Brother, Maternal Aunt, Maternal Uncle, Paternal Aunt, Paternal Uncle, Maternal Grandfather    Ovarian cancer Maternal Grandmother, Paternal Grandmother        Social History Main Topics    Smoking status: Current Every Day Smoker     Packs/day: 0.50     Years: 25.00     Types: Cigarettes    Smokeless tobacco: Never Used    Alcohol use No    Drug use: No    Sexual activity: Not Currently     Review of Systems   Constitutional: Negative for chills, diaphoresis and fever.   HENT: Negative for congestion, postnasal drip, sinus pressure and sore throat.    Eyes: Negative for visual disturbance.   Respiratory: Negative for cough, chest tightness, shortness of breath and wheezing.    Cardiovascular: Negative for chest pain, palpitations and leg swelling.   Gastrointestinal: Positive for abdominal pain (generalized abdominal cramping), diarrhea, nausea and vomiting. Negative for abdominal distention, anal bleeding, blood in stool, constipation and rectal pain.   Endocrine: Negative.    Genitourinary: Negative for dysuria.   Musculoskeletal: Negative.    Skin: Negative.    Allergic/Immunologic: Negative.    Neurological: Positive for headaches. Negative for dizziness, weakness and numbness.   Hematological: Negative.    Psychiatric/Behavioral: Negative.       Objective:     Vital Signs (Most Recent):  Temp: 98.1 °F (36.7 °C) (02/26/18 1909)  Pulse: 95 (02/26/18 1909)  Resp: 18 (02/26/18 1909)  BP: (!) 108/53 (02/26/18 1909)  SpO2: 96 % (02/26/18 1909) Vital Signs (24h Range):  Temp:  [98.1 °F (36.7 °C)-100.5 °F (38.1 °C)] 98.1 °F (36.7 °C)  Pulse:  [] 95  Resp:  [16-18] 18  SpO2:  [95 %-97 %] 96 %  BP: ()/(47-64) 108/53     Weight: 100.7 kg (222 lb)  Body mass index is 35.83 kg/m².    Physical Exam   Constitutional: She is oriented to person, place, and time. She appears well-developed and well-nourished. She is cooperative.  Non-toxic appearance. No distress.   HENT:   Head: Normocephalic and atraumatic.   Eyes: Conjunctivae and lids are normal. Pupils are equal, round, and reactive to light.   Neck: Trachea normal, normal range of motion and full passive range of motion without pain. Neck supple. Normal carotid pulses and no JVD present. No tracheal deviation present. No thyroid mass and no thyromegaly present.   Cardiovascular: Normal rate, regular rhythm, S1 normal, S2 normal, normal heart sounds and normal pulses.    Pulmonary/Chest: Effort normal and breath sounds normal. No stridor.   Abdominal: Soft. Normal appearance and bowel sounds are normal. There is no tenderness.   Musculoskeletal: Normal range of motion.   Neurological: She is alert and oriented to person, place, and time. She has normal strength. No cranial nerve deficit or sensory deficit.   Skin: Skin is warm, dry and intact. She is not diaphoretic. No cyanosis. Nails show no clubbing.   Psychiatric: She has a normal mood and affect. Her speech is normal and behavior is normal. Judgment and thought content normal. Cognition and memory are normal.         CRANIAL NERVES     CN III, IV, VI   Pupils are equal, round, and reactive to light.       Significant Labs:   CBC:   Recent Labs  Lab 02/26/18  1002   WBC 36.31*   HGB 13.8   HCT 40.9   *     CMP:   Recent Labs  Lab  02/26/18  1002      K 4.2   CL 99   CO2 30*   *   BUN 19   CREATININE 0.7   CALCIUM 9.7   PROT 6.8   ALBUMIN 3.9   BILITOT 0.5   ALKPHOS 92   AST 12   ALT 14   ANIONGAP 10   EGFRNONAA >60     Lactic Acid:   Recent Labs  Lab 02/26/18  1233   LACTATE 2.4*       Significant Imaging:   Imaging Results          X-Ray Chest AP Portable (Final result)  Result time 02/26/18 12:48:38    Final result by Ivy Jeffery MD (02/26/18 12:48:38)                 Impression:      Findings suspicious for left lower lobe infiltrate      Electronically signed by: IVY JEFFERY MD  Date:     02/26/18  Time:    12:48              Narrative:    Single view Chest radiograph dated February 26, 2018    Clinical history:Chest pain    Comparison:Chest radiograph dated November 19, 2017    Findings:  The trachea and cardiomediastinal silhouette are within normal limits.  There is patchy opacity in the left lower lobe suspicious for infiltrate. Small effusion cannot be entirely excluded. No pneumothorax. Osseous structures demonstrate no evidence for acute fractures or dislocations.                                Assessment/Plan:     * Pneumonia of left lower lobe due to infectious organism    CXR completed and shows patchy opacity in the left lower lobe suspicious for infiltrate. She presented with a temp. 100 and elevated WBCs 36.31.  Lactic acid 2.4; will repeat.  Blood/Respiratory cultures pending. Emipric ABX initiated. O2 sats 96% on 2L NC.  Plan O2 PRN - keep sats >93%. Duonebs q4 hours PRN. CBC in AM              Vomiting and diarrhea    Etiology unclear. Patient admitted for vomiting and diarrhea. No more episodes since admission. Reports eating crabs and crawfish the day before,which could be contributory. Chemistry unimpressive. Stool for c-diff pending. Clear liquid diet, advance as tolerated.             Morbid obesity    Body mass index is 35.83 kg/m².  Educated on the negative effects of obesity; encouraged to consider  lifestyle modifications (diet and exercise)              Leukocytosis    WBC 36.31, ?possibly 2/2 infectious +/- steroid injections.  Patient reports receiving a steroid injection on Wednesday in he PCP office and then again Saturday in the emergency room for uncontrolled chronic right sided lumbar back pain. Continue to trend. CBC in AM          Type 2 diabetes mellitus without complication, without long-term current use of insulin    A1c 6.1 Noc 2017. Controlled on home oral agent; will hold. HgbA1c pending. POCT glucose checks AC and HS. Correction scale and adjust accordingly. Hypoglycemia Protocol                  Sepsis    PNA likely source.  See Above          Tobacco abuse    Patient was counseled on smoking cessation. Will have a nicotine transdermal patch applied while inpatient.  Will provide additional smoking cessation counseling prior to discharge.            COPD (chronic obstructive pulmonary disease)    Stable. Breo Inhaler BID + PRN Duo-nebs.          Hypertension    Stable.  Continue home lisinopril 5 mg and lopressor 25 mg daily.            VTE Risk Mitigation         Ordered     Medium Risk of VTE  Once      02/26/18 5209             AG Salgado  Department of Hospital Medicine   [facility]

## 2018-02-27 NOTE — PLAN OF CARE
Problem: Diabetes, Type 2 (Adult)  Intervention: Support/Optimize Psychosocial Response to Condition   02/26/18 1930   Coping/Psychosocial Interventions   Supportive Measures active listening utilized   Environmental Support calm environment promoted     Intervention: Optimize Glycemic Control   02/26/18 1930   Nutrition Interventions   Glycemic Management blood glucose monitoring

## 2018-02-27 NOTE — ASSESSMENT & PLAN NOTE
Patient was counseled on smoking cessation. Will have a nicotine transdermal patch applied while inpatient.  Will provide additional smoking cessation counseling prior to discharge.

## 2018-02-27 NOTE — DISCHARGE SUMMARY
[facility] Medicine  Discharge Summary      Patient Name: Audrey Natarajan  MRN: [id number]  Admission Date: 2/26/2018  Hospital Length of Stay: 1 days  Discharge Date and Time:  02/27/2018 4:19 PM  Attending Physician: Kitty att. providers found   Discharging Provider: Suzan Jaramillo MD  Primary Care Provider: Donaldo Pena MD      HPI:   Audrey Natarajan is a 47 y.o. female with a history as below who presented to the ED with a CC of sudden onset of abdominal cramping, nausea w/NBNB vomiting and diarrhea and uncontrollable shaking. . Patient denies fever, chills, diaphoresis, SOB, chest pain, palpitations, focal deficit, LOC or any other associated symptoms. Patient reports when she woke up this AM she was fine; she drank a cup of coffee and then went to the bathroom.  While using the restroom she became nauseous, then started with vomiting, diarrhea and shakes.  She further states she could not get off the toilet d/t continuous diarrhea and remained on the toilet until EMS arrived.  She tells me the paramedic told her she had a temperature of 102.6; though she denies being febrile.  She further states that, the day prior to symptoms, she ate crabs and crawfish and thought maybe she had food poisoning.  She also tells me she has chronic back pain which worsened over the last week; she was seen by her PCP on Wednesday and received a steroid injection + prescribed a medrol dose pack as well as was seen in the emergency room and received a 2nd steroid injections.  On Admit, imaging and labs obtained and reviewed.                  * No surgery found *      Hospital Course:   Pt admitted with sepsis secondary to pneumonia. Pt signed AMA form.      Consults:     Vomiting and diarrhea    Etiology unclear. Patient admitted for vomiting and diarrhea. No more episodes since admission. Reports eating crabs and crawfish the day before,which could be contributory. Chemistry unimpressive.  Stool for c-diff pending. Clear liquid diet, advance as tolerated.               Final Active Diagnoses:    Diagnosis Date Noted POA    PRINCIPAL PROBLEM:  Pneumonia of left lower lobe due to infectious organism [J18.1] 2018 Yes    Vomiting and diarrhea [R11.10, R19.7] 2018 Yes    Morbid obesity [E66.01] 08/10/2016 Yes    Leukocytosis [D72.829] 2016 Yes    Type 2 diabetes mellitus without complication, without long-term current use of insulin [E11.9] 2015 Yes    Sepsis [A41.9] 02/10/2015 Yes    Tobacco abuse [Z72.0] 2014 Yes     Chronic    COPD (chronic obstructive pulmonary disease) [J44.9] 10/11/2013 Yes     Chronic    Hypertension [I10] 2013 Yes     Chronic      Problems Resolved During this Admission:    Diagnosis Date Noted Date Resolved POA       Discharged Condition: against medical advice    Disposition: Left Against Medical Adv*    Follow Up:  Follow-up Information     Donaldo Pena MD On 2018.    Specialty:  Internal Medicine  Why:  Nurse will contact patient to schedule appointment  Contact information:  [address] [facility] [address]  [phone]                 Patient Instructions:   No discharge procedures on file.         Pending Diagnostic Studies:     None         Medications:  None (patient  at medical facility)    Indwelling Lines/Drains at time of discharge:   Lines/Drains/Airways          No matching active lines, drains, or airways          Time spent on the discharge of patient: <30 minutes  Patient was seen and examined on the date of discharge and determined to be suitable for discharge.         Suzan Jaramillo MD  Department of Hospital Medicine  [facility]

## 2018-02-27 NOTE — ASSESSMENT & PLAN NOTE
Etiology unclear. Patient admitted for vomiting and diarrhea. No more episodes since admission. Reports eating crabs and crawfish the day before,which could be contributory. Chemistry unimpressive. Stool for c-diff pending. Clear liquid diet, advance as tolerated.

## 2018-02-27 NOTE — ASSESSMENT & PLAN NOTE
Body mass index is 35.83 kg/m².  Educated on the negative effects of obesity; encouraged to consider lifestyle modifications (diet and exercise)

## 2018-02-27 NOTE — NURSING
Assumed care from [place] Rn. Pt supine in bed, HOB raised 30*. No complaints of pain. Bed lowered, locked, and ,alarmed. Call light within reach. Will continue to monitor. Iv fluids at 125 ml/hr.

## 2018-02-27 NOTE — ASSESSMENT & PLAN NOTE
CXR completed and shows patchy opacity in the left lower lobe suspicious for infiltrate. She presented with a temp. 100 and elevated WBCs 36.31.  Lactic acid 2.4; will repeat.  Blood/Respiratory cultures pending. Emipric ABX initiated. O2 sats 96% on 2L NC.  Plan O2 PRN - keep sats >93%. Duonebs q4 hours PRN. CBC in AM

## 2018-02-27 NOTE — ASSESSMENT & PLAN NOTE
WBC 36.31, ?possibly 2/2 infectious +/- steroid injections.  Patient reports receiving a steroid injection on Wednesday in he PCP office and then again Saturday in the emergency room for uncontrolled chronic right sided lumbar back pain. Continue to trend. CBC in AM

## 2018-02-27 NOTE — PLAN OF CARE
Problem: Infection, Risk/Actual (Adult)  Intervention: Manage Suspected/Actual Infection   02/27/18 1405   Prevent/Manage Colorectal Surgical Infection   Fever Reduction/Comfort Measures lightweight bedding;lightweight clothing     Intervention: Prevent Infection/Maximize Resistance   02/27/18 1405   Pain/Comfort/Sleep Interventions   Sleep/Rest Enhancement awakenings minimized;consistent schedule promoted;regular sleep/rest pattern promoted;noise level reduced   Nutrition Interventions   Glycemic Management blood glucose monitoring       Goal: Identify Related Risk Factors and Signs and Symptoms  Related risk factors and signs and symptoms are identified upon initiation of Human Response Clinical Practice Guideline (CPG)  Outcome: Ongoing (interventions implemented as appropriate)   02/27/18 1405   Infection, Risk/Actual   Related Risk Factors (Infection, Risk/Actual) exposure to microbes   Signs and Symptoms (Infection, Risk/Actual) lab value changes;weakness     Goal: Infection Prevention/Resolution  Patient will demonstrate the desired outcomes by discharge/transition of care.  Outcome: Ongoing (interventions implemented as appropriate)   02/27/18 1405   Infection, Risk/Actual (Adult)   Infection Prevention/Resolution making progress toward outcome

## 2018-02-27 NOTE — PROGRESS NOTES
TN attempted to arrange hospital follow up appointment with Donaldo Pena MD. Nurse will contact patient to schedule appointment. Spoke with Ceci.

## 2018-02-27 NOTE — SUBJECTIVE & OBJECTIVE
"Past Medical History:   Diagnosis Date    Arthritis     Asthma     Bipolar 1 disorder     COPD (chronic obstructive pulmonary disease)     Coronary artery disease     A fib    Depression     bipolar manic depresson    Diabetes mellitus     DVT of lower extremity, bilateral July 2013    bilateral LE DVT. [place] filter placed.     Encounter for blood transfusion     History of blood clots 1. Left Leg=2003; 2.Bilateral Groin=Blood Clots= 5 or 6/ 2013 & 7/2013; 3. LLL of Lung=7/2013;  4. Lt. Lower Leg=7/2013.     Pt. had 1st Blood Clot after Reaphilfbacv=0763, & Last=2013. [place] Filter= Rt.Lateral Neck.    HTN (hypertension) 6/6/2013    Pt states that she does not have hypertension    Hypercholesteremia     Irregular heartbeat     Neuromuscular disorder     neuropathy feet    PE (pulmonary embolism) July 2013     bilat LE DVT.     Restless leg syndrome        Past Surgical History:   Procedure Laterality Date    ABDOMINAL SURGERY      BILATERAL OOPHORECTOMY Bilateral 1/12/2015    Green' s filter Right 7/4/2012    Right Neck & Tunneled Down.    HERNIA REPAIR      "[place] of Hernias Repaires around th Belly Button.", pt. states    OVARIAN CYST REMOVAL  3/13/2014    [place] REMOVAL OF OVARY/TUBE(S)      SPLENECTOMY, TOTAL  July 2003    TONSILLECTOMY      as a child    TYMPANOSTOMY TUBE PLACEMENT  1976    VEIN SURGERY  2003    Lt leg       Review of patient's allergies indicates:   Allergen Reactions    Morphine Other (See Comments)     Patient had a psychotic episode after taking Morphine  Agitation, hallucinations    Penicillins Anaphylaxis     itching       No current facility-administered medications on file prior to encounter.      Current Outpatient Prescriptions on File Prior to Encounter   Medication Sig    albuterol (VENTOLIN HFA) 90 mcg/actuation inhaler INHALE 2 PUFFS BY MOUTH INTO THE LUNGS EVERY 6 HOURS AS NEEDED FOR WHEEZING. RESCUE.    aspirin (ECOTRIN) 81 MG EC tablet Take 81 " mg by mouth.    carbamazepine (TEGRETOL) 200 mg tablet TK 2 TS PO BID    fluticasone (FLONASE) 50 mcg/actuation nasal spray 1 spray (50 mcg total) by Each Nare route once daily.    furosemide (LASIX) 20 MG tablet TAKE 1 TABLET(20 MG) BY MOUTH EVERY DAY    gabapentin (NEURONTIN) 600 MG tablet TAKE 2 TABLETS(1200 MG) BY MOUTH THREE TIMES DAILY    lisinopril (PRINIVIL,ZESTRIL) 5 MG tablet TAKE 1 TABLET(5 MG) BY MOUTH EVERY DAY    metFORMIN (GLUCOPHAGE) 1000 MG tablet TAKE 1 TABLET(1000 MG) BY MOUTH TWICE DAILY WITH MEALS    methylPREDNISolone (MEDROL DOSEPACK) 4 mg tablet use as directed    metoprolol tartrate (LOPRESSOR) 25 MG tablet Take 1 tablet (25 mg total) by mouth once daily.    multivitamin (THERAGRAN) per tablet Take 1 tablet by mouth every morning.    omeprazole 20 mg TbEC TAKE 2 TABLETS BY MOUTH ONCE DAILY AT 6AM (Patient taking differently: one tablet twice daily)    risperidone (RISPERDAL) 3 MG Tab take at bedtime    tiZANidine (ZANAFLEX) 4 MG tablet Take 1 tablet (4 mg total) by mouth every 8 (eight) hours.    b complex vitamins tablet Take 1 tablet by mouth 2 (two) times daily.     blood sugar diagnostic Strp To check BG once daily, to use with insurance preferred meter    blood-glucose meter kit To check BG once daily, to use with insurance preferred meter    clonazePAM (KLONOPIN) 1 MG tablet TK 1 T PO BID PRA AND INSOMNIA    cyanocobalamin (VITAMIN B-12) 1000 MCG tablet Take 100 mcg by mouth once daily.    fish oil-omega-3 fatty acids 300-1,000 mg capsule Take 2 g by mouth once daily. Instructed to stop 5-7 days before surgery (8/11/16).    hydrOXYzine HCl (ATARAX) 25 MG tablet TK 1 T PO QD    ibuprofen (ADVIL,MOTRIN) 600 MG tablet Take 1 tablet (600 mg total) by mouth every 8 (eight) hours as needed for Pain. (Patient taking differently: Take 600 mg by mouth every 8 (eight) hours as needed for Pain. Alternate with mobic)    ketorolac (TORADOL) 10 mg tablet Take 1 tablet (10 mg  total) by mouth 3 (three) times daily with meals.    lancets Misc To check BG once daily, to use with insurance preferred meter    meloxicam (MOBIC) 15 MG tablet Take 1 tablet (15 mg total) by mouth once daily. (Patient taking differently: Take 15 mg by mouth once daily. Alternate with ibuprofen)    mometasone-formoterol (DULERA) 100-5 mcg/actuation HFAA Inhale 2 puffs into the lungs 2 (two) times daily.    mupirocin (BACTROBAN) 2 % ointment APPLY TOPICALLY BID    OXYGEN-AIR DELIVERY SYSTEMS MISC 2 L by Misc.(Non-Drug; Combo Route) route daily as needed (and Mostly at Night.).      Family History     Problem Relation (Age of Onset)    Cataracts Father    Diabetes Father, Paternal Grandfather    Heart disease Father, Paternal Grandfather    Hypertension Father    No Known Problems Mother, Sister, Brother, Maternal Aunt, Maternal Uncle, Paternal Aunt, Paternal Uncle, Maternal Grandfather    Ovarian cancer Maternal Grandmother, Paternal Grandmother        Social History Main Topics    Smoking status: Current Every Day Smoker     Packs/day: 0.50     Years: 25.00     Types: Cigarettes    Smokeless tobacco: Never Used    Alcohol use No    Drug use: No    Sexual activity: Not Currently     Review of Systems   Constitutional: Negative for chills, diaphoresis and fever.   HENT: Negative for congestion, postnasal drip, sinus pressure and sore throat.    Eyes: Negative for visual disturbance.   Respiratory: Negative for cough, chest tightness, shortness of breath and wheezing.    Cardiovascular: Negative for chest pain, palpitations and leg swelling.   Gastrointestinal: Positive for abdominal pain (generalized abdominal cramping), diarrhea, nausea and vomiting. Negative for abdominal distention, anal bleeding, blood in stool, constipation and rectal pain.   Endocrine: Negative.    Genitourinary: Negative for dysuria.   Musculoskeletal: Negative.    Skin: Negative.    Allergic/Immunologic: Negative.    Neurological:  Positive for headaches. Negative for dizziness, weakness and numbness.   Hematological: Negative.    Psychiatric/Behavioral: Negative.      Objective:     Vital Signs (Most Recent):  Temp: 98.1 °F (36.7 °C) (02/26/18 1909)  Pulse: 95 (02/26/18 1909)  Resp: 18 (02/26/18 1909)  BP: (!) 108/53 (02/26/18 1909)  SpO2: 96 % (02/26/18 1909) Vital Signs (24h Range):  Temp:  [98.1 °F (36.7 °C)-100.5 °F (38.1 °C)] 98.1 °F (36.7 °C)  Pulse:  [] 95  Resp:  [16-18] 18  SpO2:  [95 %-97 %] 96 %  BP: ()/(47-64) 108/53     Weight: 100.7 kg (222 lb)  Body mass index is 35.83 kg/m².    Physical Exam   Constitutional: She is oriented to person, place, and time. She appears well-developed and well-nourished. She is cooperative.  Non-toxic appearance. No distress.   HENT:   Head: Normocephalic and atraumatic.   Eyes: Conjunctivae and lids are normal. Pupils are equal, round, and reactive to light.   Neck: Trachea normal, normal range of motion and full passive range of motion without pain. Neck supple. Normal carotid pulses and no JVD present. No tracheal deviation present. No thyroid mass and no thyromegaly present.   Cardiovascular: Normal rate, regular rhythm, S1 normal, S2 normal, normal heart sounds and normal pulses.    Pulmonary/Chest: Effort normal and breath sounds normal. No stridor.   Abdominal: Soft. Normal appearance and bowel sounds are normal. There is no tenderness.   Musculoskeletal: Normal range of motion.   Neurological: She is alert and oriented to person, place, and time. She has normal strength. No cranial nerve deficit or sensory deficit.   Skin: Skin is warm, dry and intact. She is not diaphoretic. No cyanosis. Nails show no clubbing.   Psychiatric: She has a normal mood and affect. Her speech is normal and behavior is normal. Judgment and thought content normal. Cognition and memory are normal.         CRANIAL NERVES     CN III, IV, VI   Pupils are equal, round, and reactive to light.        Significant Labs:   CBC:   Recent Labs  Lab 02/26/18  1002   WBC 36.31*   HGB 13.8   HCT 40.9   *     CMP:   Recent Labs  Lab 02/26/18  1002      K 4.2   CL 99   CO2 30*   *   BUN 19   CREATININE 0.7   CALCIUM 9.7   PROT 6.8   ALBUMIN 3.9   BILITOT 0.5   ALKPHOS 92   AST 12   ALT 14   ANIONGAP 10   EGFRNONAA >60     Lactic Acid:   Recent Labs  Lab 02/26/18  1233   LACTATE 2.4*       Significant Imaging:   Imaging Results          X-Ray Chest AP Portable (Final result)  Result time 02/26/18 12:48:38    Final result by Ivy Jeffery MD (02/26/18 12:48:38)                 Impression:      Findings suspicious for left lower lobe infiltrate      Electronically signed by: IVY JEFFERY MD  Date:     02/26/18  Time:    12:48              Narrative:    Single view Chest radiograph dated February 26, 2018    Clinical history:Chest pain    Comparison:Chest radiograph dated November 19, 2017    Findings:  The trachea and cardiomediastinal silhouette are within normal limits.  There is patchy opacity in the left lower lobe suspicious for infiltrate. Small effusion cannot be entirely excluded. No pneumothorax. Osseous structures demonstrate no evidence for acute fractures or dislocations.

## 2018-02-27 NOTE — ASSESSMENT & PLAN NOTE
A1c 6.1 Noc 2017. Controlled on home oral agent; will hold. HgbA1c pending. POCT glucose checks AC and HS. Correction scale and adjust accordingly. Hypoglycemia Protocol

## 2018-02-27 NOTE — HPI
Audrey Natarajan is a 47 y.o. female with a history as below who presented to the ED with a CC of sudden onset of abdominal cramping, nausea w/NBNB vomiting and diarrhea and uncontrollable shaking. . Patient denies fever, chills, diaphoresis, SOB, chest pain, palpitations, focal deficit, LOC or any other associated symptoms. Patient reports when she woke up this AM she was fine; she drank a cup of coffee and then went to the bathroom.  While using the restroom she became nauseous, then started with vomiting, diarrhea and shakes.  She further states she could not get off the toilet d/t continuous diarrhea and remained on the toilet until EMS arrived.  She tells me the paramedic told her she had a temperature of 102.6; though she denies being febrile.  She further states that, the day prior to symptoms, she ate crabs and crawfish and thought maybe she had food poisoning.  She also tells me she has chronic back pain which worsened over the last week; she was seen by her PCP on Wednesday and received a steroid injection + prescribed a medrol dose pack as well as was seen in the emergency room and received a 2nd steroid injections.  On Admit, imaging and labs obtained and reviewed.

## 2018-02-27 NOTE — NURSING
Bedside report by DANK Wolfe. Pt AAOx4. Has D5/NS @125ml/hr. Sitting on the edge of the bed. Denies pain or discomfort. . Requesting a nicotine patch.     @759 - wanted to leave AMA so she can go smoke. Nicotine patch given. She decided to stay.     @1320 - found patient returning back to room from garage. She confirmed she was out smoking. Ambulated back to room. Re-educated on smoking policy. Bed alarm on.     @1515 - patient has requested to leave AMA. She's refusing care and wanting to go home. MD notified. AMA paper signed.

## 2018-02-27 NOTE — TELEPHONE ENCOUNTER
----- Message from Alena Plasencia sent at 2/27/2018  1:21 PM CST -----  Contact: [facility] nurse    Patient need a one week Hospital follow up. Please call patient at  [phone]

## 2018-02-28 ENCOUNTER — TELEPHONE (OUTPATIENT)
Dept: FAMILY MEDICINE | Facility: CLINIC | Age: 48
End: 2018-02-28

## 2018-02-28 LAB
BACTERIA SPEC AEROBE CULT: NORMAL
GRAM STN SPEC: NORMAL

## 2018-02-28 NOTE — TELEPHONE ENCOUNTER
She should take Gabapentin, Mobic and tizanidine, she should f/u with ortho for anything stronger, referral has been placed on 2/21.

## 2018-02-28 NOTE — TELEPHONE ENCOUNTER
Pt would like to know if she could have something for pain she states she left the hospital before being discharged she states she wasn't given any meds while in the hospital. She states the tramadol didn't work and she has an appt.with pain management in June. She states she can't wait until that time she is leaving to go to [place] and don't want to have to go to the ED out in [place].

## 2018-02-28 NOTE — TELEPHONE ENCOUNTER
----- Message from Yessica Stallings sent at 2/28/2018  1:53 PM CST -----  Pt called stating that she was seen at [facility] and signed her self out saying that she was not getting the treatment she needed. Pt would like to see someone this week or next. Nothing is available. She also advised that she can't see pain management until June so something needs to be done.  Please contact her at [phone].    Thanks!

## 2018-03-02 ENCOUNTER — TELEPHONE (OUTPATIENT)
Dept: FAMILY MEDICINE | Facility: CLINIC | Age: 48
End: 2018-03-02

## 2018-03-02 NOTE — TELEPHONE ENCOUNTER
Insurance prior auth initiated @ 2:57p @[phone] for patient.  Spoke to pharmacist and informeed her that Dulera Rx for patient was approved from 3/2/2018 to 3/2/2019 @ 3p on today with EOC# [id number].

## 2018-03-03 LAB
BACTERIA BLD CULT: NORMAL
BACTERIA BLD CULT: NORMAL

## 2018-03-05 NOTE — TELEPHONE ENCOUNTER
----- Message from Chip Vidal sent at 3/5/2018  9:07 AM CST -----  Contact: Self  Pt called to f/u on request for pain medication. Pt can be reached @ [phone].

## 2018-03-13 DIAGNOSIS — I15.2 HYPERTENSION ASSOCIATED WITH DIABETES: ICD-10-CM

## 2018-03-13 DIAGNOSIS — E11.59 HYPERTENSION ASSOCIATED WITH DIABETES: ICD-10-CM

## 2018-03-13 RX ORDER — FUROSEMIDE 20 MG/1
TABLET ORAL
Qty: 30 TABLET | Refills: 2 | Status: SHIPPED | OUTPATIENT
Start: 2018-03-13 | End: 2018-10-31 | Stop reason: SDUPTHER

## 2018-04-04 ENCOUNTER — TELEPHONE (OUTPATIENT)
Dept: FAMILY MEDICINE | Facility: CLINIC | Age: 48
End: 2018-04-04

## 2018-04-04 DIAGNOSIS — E11.59 HYPERTENSION ASSOCIATED WITH DIABETES: ICD-10-CM

## 2018-04-04 DIAGNOSIS — I15.2 HYPERTENSION ASSOCIATED WITH DIABETES: ICD-10-CM
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
## 2018-04-05 RX ORDER — LISINOPRIL 5 MG/1
TABLET ORAL
Qty: 30 TABLET | Refills: 2 | Status: SHIPPED | OUTPATIENT
Start: 2018-04-05 | End: 2018-04-27 | Stop reason: SDUPTHER

## 2018-04-05 NOTE — TELEPHONE ENCOUNTER
----- Message from Esau Torres sent at 4/5/2018 11:19 AM CDT -----  Contact: self  Refill: lisinopril (PRINIVIL,ZESTRIL) 5 MG tablet. Send to [address] [place] [address]    Contact pt at [phone]

## 2018-04-07 ENCOUNTER — HOSPITAL ENCOUNTER (EMERGENCY)
Facility: HOSPITAL | Age: 48
Discharge: HOME | End: 2018-04-07
Attending: EMERGENCY MEDICINE
Payer: MEDICAID

## 2018-04-07 VITALS
OXYGEN SATURATION: 97 % | TEMPERATURE: 98 F | SYSTOLIC BLOOD PRESSURE: 127 MMHG | DIASTOLIC BLOOD PRESSURE: 68 MMHG | HEIGHT: 66 IN | RESPIRATION RATE: 18 BRPM | HEART RATE: 72 BPM

## 2018-04-07 DIAGNOSIS — M25.512 ACUTE PAIN OF LEFT SHOULDER: Primary | ICD-10-CM

## 2018-04-07 PROCEDURE — 96372 THER/PROPH/DIAG INJ SC/IM: CPT

## 2018-04-07 PROCEDURE — 99283 EMERGENCY DEPT VISIT LOW MDM: CPT | Mod: 25

## 2018-04-07 PROCEDURE — 63600175 PHARM REV CODE 636 W HCPCS: Performed by: PHYSICIAN ASSISTANT

## 2018-04-07 PROCEDURE — 25000003 PHARM REV CODE 250: Performed by: PHYSICIAN ASSISTANT

## 2018-04-07 RX ORDER — MELOXICAM 7.5 MG/1
7.5 TABLET ORAL DAILY
Qty: 12 TABLET | Refills: 0 | Status: SHIPPED | OUTPATIENT
Start: 2018-04-07 | End: 2018-07-06

## 2018-04-07 RX ORDER — ACETAMINOPHEN 325 MG/1
650 TABLET ORAL
Status: COMPLETED | OUTPATIENT
Start: 2018-04-07 | End: 2018-04-07

## 2018-04-07 RX ORDER — ORPHENADRINE CITRATE 100 MG/1
100 TABLET, EXTENDED RELEASE ORAL 2 TIMES DAILY
Qty: 8 TABLET | Refills: 0 | Status: SHIPPED | OUTPATIENT
Start: 2018-04-07 | End: 2018-04-11

## 2018-04-07 RX ORDER — LIDOCAINE 50 MG/G
1 PATCH TOPICAL DAILY
Qty: 6 PATCH | Refills: 0 | Status: SHIPPED | OUTPATIENT
Start: 2018-04-07 | End: 2018-08-01

## 2018-04-07 RX ORDER — KETOROLAC TROMETHAMINE 30 MG/ML
30 INJECTION, SOLUTION INTRAMUSCULAR; INTRAVENOUS
Status: COMPLETED | OUTPATIENT
Start: 2018-04-07 | End: 2018-04-07

## 2018-04-07 RX ORDER — ORPHENADRINE CITRATE 30 MG/ML
30 INJECTION INTRAMUSCULAR; INTRAVENOUS
Status: COMPLETED | OUTPATIENT
Start: 2018-04-07 | End: 2018-04-07

## 2018-04-07 RX ADMIN — ORPHENADRINE CITRATE 30 MG: 30 INJECTION INTRAMUSCULAR; INTRAVENOUS at 06:04

## 2018-04-07 RX ADMIN — ACETAMINOPHEN 650 MG: 325 TABLET ORAL at 05:04

## 2018-04-07 RX ADMIN — KETOROLAC TROMETHAMINE 30 MG: 30 INJECTION, SOLUTION INTRAMUSCULAR at 05:04

## 2018-04-07 NOTE — ED PROVIDER NOTES
"Encounter Date: 4/7/2018    SCRIBE #1 NOTE: I, Panfilo Dewey, am scribing for, and in the presence of,  Trav Earl PA-C. I have scribed the following portions of the note - Other sections scribed: HPI, ROS.       History     Chief Complaint   Patient presents with    Shoulder Pain     " I am having pain in my left shoulder since Tuesday after picking up grocery bags. I also had teeth pulled Wednesday and I am on my last pain pill."      CC: Shoulder Pain    HPI: This 47 y.o. Female presents to the ED c/o gradual onset, progressively worsening left shoulder pain which began 4 days ago while sweeping. Pt describes the pain as "sharp". Pt reports exacerbation with movement and alleviation with heating pads and rest. She believes she pulled a muscle and advises her current symptoms feel different from her past PE. Pt reports a mild frontal headache. Pt also reports mild lower left dental pain secondary to having 2 teeth extracted 3 days ago that is overall improving. She notes she is primarily concerned with the L shoulder pain. Pt denies fever, numbness, nausea, vomiting, SOB, CP, abd pain, and calf pain. Pt reports taking hydrocodone (last pill taken at 1350) prescribed by her dentist as tx with temporary relief. Pt reports hx of left frozen shoulder and tennis elbow. Pt denies hx of MI. Pt denies recent trauma. Pt denies taking antibiotics and blood thinners. LessThan3 filter in place.     Pt has PMHx of HTN, DM, PE, DVT, COPD, CAD, hypercholesteremia, irregular heartbeat, arthritis, asthma, neuromuscular disorder, RLS, bipolar 1 disorder, depression, and PSHx splenectomy, green's filter, vein surgery, tonsillectomy, ovarian cyst removal, hernia repair, and b/l oophorectomy.      The history is provided by the patient. No  was used.     Review of patient's allergies indicates:   Allergen Reactions    Morphine Other (See Comments)     Patient had a psychotic episode after taking " "Morphine  Agitation, hallucinations    Penicillins Anaphylaxis     itching     Past Medical History:   Diagnosis Date    Arthritis     Asthma     Bipolar 1 disorder     COPD (chronic obstructive pulmonary disease)     Coronary artery disease     A fib    Depression     bipolar manic depresson    Diabetes mellitus     DVT of lower extremity, bilateral 2013    bilateral LE DVT. [place] filter placed.     Encounter for blood transfusion     History of blood clots 1. Left Leg=; 2.Bilateral Groin=Blood Clots=  or 2013 & 2013; 3. LLL of Lung=2013;  4. Lt. Lower Leg=2013.     Pt. had 1st Blood Clot after Clzlkwiicosb=1024, & Last=. [place] Filter= Rt.Lateral Neck.    HTN (hypertension) 2013    Pt states that she does not have hypertension    Hypercholesteremia     Irregular heartbeat     Neuromuscular disorder     neuropathy feet    PE (pulmonary embolism) 2013     bilat LE DVT.     Restless leg syndrome      Past Surgical History:   Procedure Laterality Date    ABDOMINAL SURGERY      BILATERAL OOPHORECTOMY Bilateral 2015    Green' s filter Right 2012    Right Neck & Tunneled Down.    HERNIA REPAIR      "[place] of Hernias Repaires around th Belly Button.", pt. states    OVARIAN CYST REMOVAL  3/13/2014    [place] REMOVAL OF OVARY/TUBE(S)      SPLENECTOMY, TOTAL  2003    TONSILLECTOMY      as a child    TYMPANOSTOMY TUBE PLACEMENT      VEIN SURGERY      Lt leg     Family History   Problem Relation Age of Onset    Hypertension Father     Diabetes Father     Heart disease Father     Cataracts Father     Diabetes Paternal Grandfather     Heart disease Paternal Grandfather     No Known Problems Mother     Ovarian cancer Maternal Grandmother       from this. ? age     No Known Problems Sister     No Known Problems Brother     No Known Problems Maternal Aunt     No Known Problems Maternal Uncle     No Known Problems Paternal Aunt     " No Known Problems Paternal Uncle     No Known Problems Maternal Grandfather     Ovarian cancer Paternal Grandmother     Uterine cancer Neg Hx     Breast cancer Neg Hx     Colon cancer Neg Hx     Amblyopia Neg Hx     Blindness Neg Hx     Cancer Neg Hx     Glaucoma Neg Hx     Macular degeneration Neg Hx     Retinal detachment Neg Hx     Strabismus Neg Hx     Stroke Neg Hx     Thyroid disease Neg Hx      Social History   Substance Use Topics    Smoking status: Current Every Day Smoker     Packs/day: 0.50     Years: 25.00     Types: Cigarettes    Smokeless tobacco: Never Used    Alcohol use No     Review of Systems   Constitutional: Negative for fever.   HENT: Positive for dental problem (lower L pain). Negative for sore throat.    Respiratory: Negative for cough and shortness of breath.    Cardiovascular: Negative for chest pain and leg swelling.   Gastrointestinal: Negative for abdominal distention, abdominal pain, nausea and vomiting.   Genitourinary: Negative for dysuria.   Musculoskeletal: Negative for back pain.        (+) L shoulder pain  (-) calf pain   Skin: Negative for rash.   Neurological: Positive for headaches. Negative for weakness and numbness.   Hematological: Does not bruise/bleed easily.       Physical Exam     Initial Vitals [04/07/18 1640]   BP Pulse Resp Temp SpO2   (!) 172/79 99 18 98.6 °F (37 °C) 97 %      MAP       110         Physical Exam    Nursing note and vitals reviewed.  Constitutional: She appears well-developed and well-nourished. She is not diaphoretic. No distress.   HENT:   Head: Normocephalic and atraumatic.   Right Ear: Tympanic membrane, external ear and ear canal normal. No mastoid tenderness.   Left Ear: Tympanic membrane, external ear and ear canal normal. No mastoid tenderness.   Nose: Nose normal. No rhinorrhea. Right sinus exhibits no maxillary sinus tenderness and no frontal sinus tenderness. Left sinus exhibits no maxillary sinus tenderness and no frontal  sinus tenderness.   Mouth/Throat: Uvula is midline and oropharynx is clear and moist. No trismus in the jaw. Abnormal dentition. Dental caries present. No dental abscesses or uvula swelling. No oropharyngeal exudate, posterior oropharyngeal edema, posterior oropharyngeal erythema or tonsillar abscesses.   Eyes: Conjunctivae and EOM are normal. Right eye exhibits no discharge. Left eye exhibits no discharge.   Neck: Normal range of motion. No tracheal deviation present. No JVD present.   Cardiovascular: Normal rate, regular rhythm and normal heart sounds. Exam reveals no friction rub.    No murmur heard.  Pulmonary/Chest: Breath sounds normal. No stridor. No respiratory distress. She has no decreased breath sounds. She has no wheezes. She has no rhonchi. She has no rales. She exhibits no tenderness.   Abdominal: Soft. She exhibits no distension. There is no tenderness. There is no rigidity, no rebound and no guarding.   Musculoskeletal: Normal range of motion.   Reproducible TTP of surrounding L shoulder musculature without bony TTP or overlying skin abnormalities. Full ROM of shoulders, which also reproduces pain to L shoulder. Radial pulses 2+ and equal. Sensation intact. Upper extremity strength intact and equal.    Lymphadenopathy:     She has no cervical adenopathy.   Neurological: She is alert and oriented to person, place, and time. She has normal strength. She displays no tremor. No cranial nerve deficit. She displays no seizure activity. Coordination and gait normal. GCS eye subscore is 4. GCS verbal subscore is 5. GCS motor subscore is 6.   Skin: Skin is warm and dry. No rash and no abscess noted. No erythema. No pallor.         ED Course   Procedures  Labs Reviewed - No data to display  EKG Readings: (Independently Interpreted)   Initial Reading: No STEMI. Rhythm: Normal Sinus Rhythm. Heart Rate: 99. Axis: Normal.          Medical Decision Making:   History:   Old Medical Records: I decided to obtain old  "medical records.  Initial Assessment:   48 yo F with atraumatic L shoulder pain s/p sweeping and unloading groceries. Also noting dental pain s/p dental extraction 2 days ago that is overall improving and that she is less concerned about today. Denies CP, SOB, numbness, and fever.   Clinical Tests:   Medical Tests: Ordered and Reviewed  ED Management:  Presentation most suspicious for muscle strain; patient agrees. I carefully consider and discuss unlikely PE with patient who advises symptoms feel completely different from her past PE and that she doesn't want workup for unlikely PE at this time. I also carefully consider but doubt occult cardiac etiology in this patient with no CP, no SOB, normal EKG, and stable vitals with 4 day Hx of symptoms. I doubt PTX, aortic dissection, and HZV. No Hx of trauma to suggest acute fracture/dislocation. No septic joint.     Toradol, Tylenol, and Norflex in ED with significant improvement of symptoms. "I feel so much better!". Smiling and actively ranging her L shoulder without complication.     Advising PCP follow up. We discuss shoulder ranging exercises. Strict return precautions discussed. Patient agreeable to plan.   Other:   I have discussed this case with another health care provider.       <> Summary of the Discussion: Case discussed with Dr. Tsai who is in agreement with my assessment and plan.             Scribe Attestation:   Scribe #1: I performed the above scribed service and the documentation accurately describes the services I performed. I attest to the accuracy of the note.    Attending Attestation:           Physician Attestation for Scribe:  Physician Attestation Statement for Scribe #1: I, Trav Earl PA-C, reviewed documentation, as scribed by Panfilo Palomo in my presence, and it is both accurate and complete.                    Clinical Impression:   The encounter diagnosis was Acute pain of left shoulder.    Disposition:   Disposition: Discharged  Condition: " Stable                        Trav Earl PA-C  04/07/18 5124

## 2018-04-07 NOTE — ED TRIAGE NOTES
Left shoulder pain since Tuesday after picking up grocery bag  Wednesday had tooth pulled and used last pain pill had 2 teeth pulled

## 2018-04-27 ENCOUNTER — OFFICE VISIT (OUTPATIENT)
Dept: FAMILY MEDICINE | Facility: CLINIC | Age: 48
End: 2018-04-27
Payer: MEDICAID

## 2018-04-27 VITALS
WEIGHT: 230 LBS | OXYGEN SATURATION: 96 % | DIASTOLIC BLOOD PRESSURE: 72 MMHG | BODY MASS INDEX: 36.96 KG/M2 | SYSTOLIC BLOOD PRESSURE: 134 MMHG | HEIGHT: 66 IN | RESPIRATION RATE: 17 BRPM | TEMPERATURE: 99 F | HEART RATE: 88 BPM

## 2018-04-27 DIAGNOSIS — L84 PRE-ULCERATIVE CALLUSES: ICD-10-CM

## 2018-04-27 DIAGNOSIS — G89.29 CHRONIC BILATERAL LOW BACK PAIN WITHOUT SCIATICA: ICD-10-CM

## 2018-04-27 DIAGNOSIS — F17.200 TOBACCO DEPENDENCE: ICD-10-CM

## 2018-04-27 DIAGNOSIS — I15.2 HYPERTENSION ASSOCIATED WITH DIABETES: ICD-10-CM

## 2018-04-27 DIAGNOSIS — M54.50 CHRONIC MIDLINE LOW BACK PAIN WITHOUT SCIATICA: ICD-10-CM

## 2018-04-27 DIAGNOSIS — E11.9 TYPE 2 DIABETES MELLITUS WITHOUT COMPLICATION, WITHOUT LONG-TERM CURRENT USE OF INSULIN: Primary | ICD-10-CM

## 2018-04-27 DIAGNOSIS — K21.9 GASTROESOPHAGEAL REFLUX DISEASE WITHOUT ESOPHAGITIS: ICD-10-CM

## 2018-04-27 DIAGNOSIS — E11.59 HYPERTENSION ASSOCIATED WITH DIABETES: ICD-10-CM

## 2018-04-27 DIAGNOSIS — M54.50 CHRONIC BILATERAL LOW BACK PAIN WITHOUT SCIATICA: ICD-10-CM

## 2018-04-27 DIAGNOSIS — G89.29 CHRONIC MIDLINE LOW BACK PAIN WITHOUT SCIATICA: ICD-10-CM

## 2018-04-27 PROBLEM — K08.89 TOOTHACHE: Status: RESOLVED | Noted: 2017-04-13 | Resolved: 2018-04-27

## 2018-04-27 PROBLEM — H92.02 LEFT EAR PAIN: Status: RESOLVED | Noted: 2017-04-13 | Resolved: 2018-04-27

## 2018-04-27 PROCEDURE — 99214 OFFICE O/P EST MOD 30 MIN: CPT | Mod: S$PBB,,, | Performed by: INTERNAL MEDICINE

## 2018-04-27 PROCEDURE — 99999 PR PBB SHADOW E&M-EST. PATIENT-LVL IV: CPT | Mod: PBBFAC,,, | Performed by: INTERNAL MEDICINE

## 2018-04-27 PROCEDURE — 99214 OFFICE O/P EST MOD 30 MIN: CPT | Mod: PBBFAC,PN | Performed by: INTERNAL MEDICINE

## 2018-04-27 RX ORDER — MELOXICAM 15 MG/1
15 TABLET ORAL DAILY
Qty: 90 TABLET | Refills: 1 | Status: SHIPPED | OUTPATIENT
Start: 2018-04-27 | End: 2018-07-06

## 2018-04-27 RX ORDER — METFORMIN HYDROCHLORIDE 1000 MG/1
TABLET ORAL
Qty: 60 TABLET | Refills: 2 | Status: SHIPPED | OUTPATIENT
Start: 2018-04-27 | End: 2018-08-04 | Stop reason: SDUPTHER

## 2018-04-27 RX ORDER — PRAVASTATIN SODIUM 40 MG/1
40 TABLET ORAL DAILY
Qty: 90 TABLET | Refills: 3 | Status: SHIPPED | OUTPATIENT
Start: 2018-04-27 | End: 2018-10-31 | Stop reason: SDUPTHER

## 2018-04-27 RX ORDER — RISPERIDONE 4 MG/1
TABLET ORAL
COMMUNITY
Start: 2018-04-02 | End: 2018-08-01

## 2018-04-27 RX ORDER — HYDROCODONE BITARTRATE AND ACETAMINOPHEN 7.5; 325 MG/1; MG/1
TABLET ORAL
COMMUNITY
Start: 2018-04-05 | End: 2018-04-27 | Stop reason: ALTCHOICE

## 2018-04-27 RX ORDER — LISINOPRIL 5 MG/1
TABLET ORAL
Qty: 30 TABLET | Refills: 2 | Status: SHIPPED | OUTPATIENT
Start: 2018-04-27 | End: 2018-10-10 | Stop reason: SDUPTHER

## 2018-04-27 RX ORDER — METHOCARBAMOL 500 MG/1
500 TABLET, FILM COATED ORAL EVERY 6 HOURS PRN
Qty: 100 TABLET | Refills: 2 | Status: SHIPPED | OUTPATIENT
Start: 2018-04-27 | End: 2018-08-21 | Stop reason: SDUPTHER

## 2018-04-27 RX ORDER — IBUPROFEN 800 MG/1
TABLET ORAL
COMMUNITY
Start: 2018-04-12 | End: 2018-04-27

## 2018-04-27 NOTE — PROGRESS NOTES
"Subjective:       Patient ID: Audrey Natarajan is a 47 y.o. female.    Chief Complaint: Hospital Follow Up    mutliple complaints    HPI: 48 y/o w/ DM HTN chronic lower back pain s/p gastric bypass presents fro scheduled follow up. seh has chronic callous to right first toe feels it has been more painful over last month no ulceration no discahrge. She is using topical moistuirzer cream twice per day no trauma no discharge (wears sandals mostly not barefoot).  Also chronic lower back pain previously had PT (more for hip, not healthy back) using nsaid most days  And muscle relaxer for flares. No loss of bowel or bladder no change in nature or severity of pain. Not following recommended bariatric diet weight has been going up limited physical activity due to chronic pain      Review of Systems   Constitutional: Negative for activity change, appetite change, fatigue, fever and unexpected weight change.   HENT: Negative for ear pain, rhinorrhea and sore throat.    Eyes: Negative for discharge and visual disturbance.   Respiratory: Negative for chest tightness, shortness of breath and wheezing.    Cardiovascular: Negative for chest pain, palpitations and leg swelling.   Gastrointestinal: Negative for abdominal pain, constipation and diarrhea.   Endocrine: Negative for cold intolerance and heat intolerance.   Genitourinary: Negative for dysuria and hematuria.   Musculoskeletal: Positive for back pain. Negative for joint swelling and neck stiffness.   Skin: Negative for rash.   Neurological: Negative for dizziness, syncope, weakness and headaches.   Psychiatric/Behavioral: Negative for suicidal ideas.       Objective:     Vitals:    04/27/18 1032   BP: 134/72   BP Location: Left arm   Patient Position: Sitting   BP Method: Large (Manual)   Pulse: 88   Resp: 17   Temp: 98.7 °F (37.1 °C)   TempSrc: Oral   SpO2: 96%   Weight: 104.3 kg (230 lb)   Height: 5' 6" (1.676 m)          Physical Exam   Constitutional: She is " oriented to person, place, and time. She appears well-developed and well-nourished.   HENT:   Head: Normocephalic and atraumatic.   Eyes: Conjunctivae are normal. Pupils are equal, round, and reactive to light.   Neck: Normal range of motion.   Cardiovascular: Normal rate and regular rhythm.  Exam reveals no gallop and no friction rub.    No murmur heard.  Pulmonary/Chest: Effort normal and breath sounds normal. She has no wheezes. She has no rales.   Abdominal: Soft. Bowel sounds are normal. There is no tenderness. There is no rebound and no guarding.   Musculoskeletal: Normal range of motion. She exhibits no edema or tenderness.   Neurological: She is alert and oriented to person, place, and time. No cranial nerve deficit.   Skin: Skin is warm and dry.   Right first toe lateral aspect with callous without ucleration all nails thickened with friablity interdiginous skin intact   Psychiatric: She has a normal mood and affect.       Assessment and Plan   1. Type 2 diabetes mellitus without complication, without long-term current use of insulin  contineu metformin daily low intensity statin at night a1c prior to follow up in two months  - Ambulatory referral to Podiatry  - Comprehensive metabolic panel; Future  - Hemoglobin A1c; Future  - CBC auto differential; Future  - pravastatin (PRAVACHOL) 40 MG tablet; Take 1 tablet (40 mg total) by mouth once daily.  Dispense: 90 tablet; Refill: 3    2. Pre-ulcerative calluses  Discussed importance to protective shoes at all times  - Ambulatory referral to Podiatry    3. Chronic midline low back pain without sciatica  Muscle relaxer first line referal to [facility] to restart HEP  - methocarbamol (ROBAXIN) 500 MG Tab; Take 1 tablet (500 mg total) by mouth every 6 (six) hours as needed.  Dispense: 100 tablet; Refill: 2    4. Tobacco dependence  referal for smoking cessation  - Ambulatory referral to Smoking Cessation Program    5. Chronic bilateral low back pain without  sciatica  As above  - meloxicam (MOBIC) 15 MG tablet; Take 1 tablet (15 mg total) by mouth once daily.  Dispense: 90 tablet; Refill: 1  - Ambulatory consult to [facility]      7. Hypertension associated with diabetes  bp at goal continue current medications  - lisinopril (PRINIVIL,ZESTRIL) 5 MG tablet; TAKE 1 TABLET(5 MG) BY MOUTH EVERY DAY  Dispense: 30 tablet; Refill: 2

## 2018-05-04 ENCOUNTER — TELEPHONE (OUTPATIENT)
Dept: FAMILY MEDICINE | Facility: CLINIC | Age: 48
End: 2018-05-04

## 2018-05-04 NOTE — TELEPHONE ENCOUNTER
----- Message from Margarita Alvarado sent at 5/3/2018  4:40 PM CDT -----  Contact: Self/ [phone]  Pt forgot to request RX for albuterol 2.5 for her nebulizer. Thank you.    [facility] [address] [place] [address] [place] [address] [place] [address] [address]  Phone: [phone] Fax: [phone]

## 2018-05-04 NOTE — TELEPHONE ENCOUNTER
Left message on answering machine to return call to clinic. Need clarity about request for charted medication. This is not in her profile.

## 2018-05-14 ENCOUNTER — DOCUMENTATION ONLY (OUTPATIENT)
Dept: REHABILITATION | Facility: HOSPITAL | Age: 48
End: 2018-05-14

## 2018-05-14 NOTE — PROGRESS NOTES
PHYSICAL THERAPY DISCHARGE:    This patient was originally [facility] at [facility] [place]     Pt attended PT for a total of 6 Visits receiving: Manual Therapy, Therex, Postural Education, Body Mechanics Training, Home Exercise Instruction     This patient's last visit occurred on: 2/21/2018    Short term goals were achieved: Not met    Long term goals were achieved:Not met    Pt was DC'd from our care secondary to: Pt will be discharge today. Pt has not have been to [facility] PT session over 6 weeks.        Therapist: Mayur He, PT  5/14/2018

## 2018-05-17 ENCOUNTER — TELEPHONE (OUTPATIENT)
Dept: FAMILY MEDICINE | Facility: CLINIC | Age: 48
End: 2018-05-17

## 2018-05-17 NOTE — TELEPHONE ENCOUNTER
Raquel from [facility] called to let doctor know that she discontinued the diabetes program. Please call [phone] ext [phone]

## 2018-05-17 NOTE — TELEPHONE ENCOUNTER
----- Message from Trish Lino sent at 5/16/2018  3:08 PM CDT -----  Contact: [phone]  Refill on breathing treatment medication and a new moth piece and the hose that goes through the mouth piece pt said she called last week Please call pt at your earliest convenience.  Thanks ![facility] [address] [place] [address] [address] [place] [address] [place] AT [facility]

## 2018-05-18 ENCOUNTER — OFFICE VISIT (OUTPATIENT)
Dept: URGENT CARE | Facility: CLINIC | Age: 48
End: 2018-05-18
Payer: MEDICAID

## 2018-05-18 VITALS
HEIGHT: 66 IN | BODY MASS INDEX: 36.48 KG/M2 | RESPIRATION RATE: 12 BRPM | OXYGEN SATURATION: 96 % | HEART RATE: 100 BPM | SYSTOLIC BLOOD PRESSURE: 126 MMHG | WEIGHT: 227 LBS | DIASTOLIC BLOOD PRESSURE: 63 MMHG

## 2018-05-18 DIAGNOSIS — E11.59 HYPERTENSION ASSOCIATED WITH DIABETES: ICD-10-CM

## 2018-05-18 DIAGNOSIS — I15.2 HYPERTENSION ASSOCIATED WITH DIABETES: ICD-10-CM

## 2018-05-18 DIAGNOSIS — R60.0 PERIPHERAL EDEMA: Primary | ICD-10-CM

## 2018-05-18 LAB
BILIRUB UR QL STRIP: NEGATIVE
GLUCOSE UR QL STRIP: NEGATIVE
KETONES UR QL STRIP: NEGATIVE
LEUKOCYTE ESTERASE UR QL STRIP: NEGATIVE
PH, POC UA: 6.5 (ref 5–8)
POC BLOOD, URINE: NEGATIVE
POC NITRATES, URINE: NEGATIVE
PROT UR QL STRIP: NEGATIVE
SP GR UR STRIP: 1.01 (ref 1–1.03)
UROBILINOGEN UR STRIP-ACNC: NORMAL (ref 0.1–1.1)

## 2018-05-18 PROCEDURE — 81003 URINALYSIS AUTO W/O SCOPE: CPT | Mod: QW,S$GLB,, | Performed by: FAMILY MEDICINE

## 2018-05-18 PROCEDURE — 99214 OFFICE O/P EST MOD 30 MIN: CPT | Mod: 25,S$GLB,, | Performed by: FAMILY MEDICINE

## 2018-05-18 RX ORDER — LISDEXAMFETAMINE DIMESYLATE 30 MG/1
CAPSULE ORAL
Refills: 0 | COMMUNITY
Start: 2018-05-05 | End: 2018-08-01

## 2018-05-18 RX ORDER — POTASSIUM CHLORIDE 20 MEQ/1
40 TABLET, EXTENDED RELEASE ORAL DAILY
Qty: 10 TABLET | Refills: 0 | Status: SHIPPED | OUTPATIENT
Start: 2018-05-18 | End: 2018-05-23

## 2018-05-18 RX ORDER — MUPIROCIN 20 MG/G
OINTMENT TOPICAL
Refills: 3 | COMMUNITY
Start: 2018-04-30 | End: 2018-08-01

## 2018-05-18 RX ORDER — FUROSEMIDE 40 MG/1
40 TABLET ORAL DAILY
Qty: 5 TABLET | Refills: 0 | Status: SHIPPED | OUTPATIENT
Start: 2018-05-18 | End: 2018-05-23

## 2018-05-18 NOTE — PROGRESS NOTES
"Subjective:       Patient ID: Audrey Natarajan is a 47 y.o. female.    Vitals:  height is 5' 6" (1.676 m) and weight is 103 kg (227 lb). Her blood pressure is 126/63 and her pulse is 100. Her respiration is 12 and oxygen saturation is 96%.     Chief Complaint: Foot Pain    Patient has an appt w/ podiatry on June 13th.  Patient states that she feels that there a bubble on the arch of her left foot.       Foot Pain   This is a new problem. The current episode started 1 to 4 weeks ago. The problem occurs intermittently. The problem has been unchanged. Associated symptoms include joint swelling. Pertinent negatives include no abdominal pain, anorexia, arthralgias, change in bowel habit, chest pain, chills, congestion, coughing, diaphoresis, fatigue, fever, headaches, myalgias, nausea, neck pain, numbness, rash, sore throat, swollen glands, urinary symptoms, vertigo, visual change, vomiting or weakness. The symptoms are aggravated by walking and standing. Treatments tried: Gabapentin & Elevation. The treatment provided mild relief.     Review of Systems   Constitution: Negative for chills, diaphoresis, fatigue, fever, weakness and malaise/fatigue.   HENT: Negative for congestion, nosebleeds and sore throat.    Cardiovascular: Negative for chest pain and syncope.   Respiratory: Negative for cough and shortness of breath.    Skin: Negative for rash.   Musculoskeletal: Positive for joint pain and joint swelling. Negative for arthralgias, back pain, myalgias and neck pain.   Gastrointestinal: Negative for abdominal pain, anorexia, change in bowel habit, nausea and vomiting.   Genitourinary: Negative for hematuria.   Neurological: Negative for dizziness, headaches, numbness and vertigo.   All other systems reviewed and are negative.      Objective:      Physical Exam   Constitutional: She is oriented to person, place, and time. She appears well-developed and well-nourished.   HENT:   Head: Normocephalic and atraumatic. "   Nose: Nose normal.   Mouth/Throat: Oropharynx is clear and moist.   Eyes: Conjunctivae and EOM are normal. Pupils are equal, round, and reactive to light.   Neck: Normal range of motion.   Cardiovascular: Normal rate, regular rhythm, normal heart sounds and intact distal pulses.    Pulses:       Dorsalis pedis pulses are 2+ on the right side, and 2+ on the left side.   Mild pitting edema bilateral lower extremities.    Pulmonary/Chest: Effort normal.   Abdominal: Soft. Bowel sounds are normal.   Musculoskeletal: Normal range of motion.   Feet:   Right Foot:   Skin Integrity: Positive for callus and dry skin. Negative for ulcer, blister, erythema or warmth.   Left Foot:   Skin Integrity: Positive for callus and dry skin. Negative for ulcer, blister, erythema or warmth.   Neurological: She is alert and oriented to person, place, and time.       Office Visit on 05/18/2018   Component Date Value Ref Range Status    POC Blood, Urine 05/18/2018 Negative  Negative Final    POC Bilirubin, Urine 05/18/2018 Negative  Negative Final    POC Urobilinogen, Urine 05/18/2018 Normal  0.1 - 1.1 Final    POC Ketones, Urine 05/18/2018 Negative  Negative Final    POC Protein, Urine 05/18/2018 Negative  Negative Final    POC Nitrates, Urine 05/18/2018 Negative  Negative Final    POC Glucose, Urine 05/18/2018 Negative  Negative Final    pH, UA 05/18/2018 6.5  5 - 8 Final    POC Specific Gravity, Urine 05/18/2018 1.015  1.003 - 1.029 Final    POC Leukocytes, Urine 05/18/2018 Negative  Negative Final       Assessment:       1. Peripheral edema    2. Hypertension associated with diabetes        Plan:         Peripheral edema  -     POCT Urinalysis, Dipstick, Automated, W/O Scope    Hypertension associated with diabetes    Other orders  -     potassium chloride SA (K-DUR,KLOR-CON) 20 MEQ tablet; Take 2 tablets (40 mEq total) by mouth once daily.  Dispense: 10 tablet; Refill: 0  -     furosemide (LASIX) 40 MG tablet; Take 1 tablet  (40 mg total) by mouth once daily.  Dispense: 5 tablet; Refill: 0    Will increase Lasix to 40mg for next 5 days.   Will add Potassium supplements for next 5 days.  Reviewed foot protection.  Advised elevation of lower extremities.  Advised follow up with PCP and Podiatry as scheduled.    Patient Instructions     Leg Swelling in Both Legs    Swelling of the feet, ankles, and legs is called edema. It is caused by excess fluid that has collected in the tissues. Extra fluid in the body settles in the lowest part because of gravity. This is why the legs and feet are most affected.  Some of the causes for edema include:  · Disease of the heart like congestive heart failure  · Standing or sitting for long periods of time  · Infection of the feet or legs  · Blood pooling in the veins of your legs (venous insufficiency)  · Dilated veins in your lower leg (varicose veins)  · Garters or other clothing that is tight on your legs. This will cause blood to pool in your legs because the clothing limits blood flow.  · Some medicines such as hormones like birth control pills, some blood pressure medicines like calcium channel blockers (amlodipine) and steroids, some antidepressants like MAO inhibitors and tricyclics  · Menstrual periods that cause you to retain fluids  · Many types of renal disease  · Liver failure or cirrhosis  · Pregnancy, some swelling is normal, but a sudden increase in leg swelling or weight gain can be a sign of a dangerous complication of pregnancy  · Poor nutrition  · Thyroid disease  Medical treatment will depend on what is causing the swelling in your legs. Your healthcare provider may prescribe water pills (diuretics) to get rid of the extra fluid.  Home care  Follow these guidelines when caring for yourself at home:  · Don't wear clothing like garters that is tight on your legs.  · Keep your legs up while lying or sitting.  · If infection, injury, or recent surgery is causing the swelling, stay off your  legs as much as possible until symptoms get better.  · If your healthcare provider says that your leg swelling is caused by venous insufficiency or varicose veins, don't sit or  one place for long periods of time. Take breaks and walk about every few hours. Brisk walking is a good exercise. It helps circulate the blood that has collected in your leg. Talk with your provider about using support stockings to stop daytime leg swelling.  · If your provider says that heart disease is causing your leg swelling, follow a low-salt diet to stop extra fluid from staying in your body. You may also need medicine.  Follow-up care  Follow up with your healthcare provider, or as advised.  When to seek medical advice  Call your healthcare provider right away if any of these occur:  · New shortness of breath or chest pain  · Shortness of breath or chest pain that gets worse  · Swelling in both legs or ankles that gets worse  · Swelling of the abdomen  · Redness, warmth, or swelling in one leg  · Fever of 100.4ºF (38ºC) or higher, or as directed by your healthcare provider  · Yellow color to your skin or eyes  · Rapid, unexplained weight gain  · Having to sleep upright or use an increased number of pillows  Date Last Reviewed: 3/31/2016  © 0052-3494 [employer] [address] [place] [address], [place] [address]. All rights reserved. This information is not intended as a substitute for professional medical care. Always follow your healthcare professional's instructions.

## 2018-05-18 NOTE — TELEPHONE ENCOUNTER
----- Message from Emeli Riley sent at 5/18/2018 10:12 AM CDT -----  Contact: [phone]/PT  Returning call to nurse

## 2018-05-18 NOTE — PATIENT INSTRUCTIONS
Leg Swelling in Both Legs    Swelling of the feet, ankles, and legs is called edema. It is caused by excess fluid that has collected in the tissues. Extra fluid in the body settles in the lowest part because of gravity. This is why the legs and feet are most affected.  Some of the causes for edema include:  · Disease of the heart like congestive heart failure  · Standing or sitting for long periods of time  · Infection of the feet or legs  · Blood pooling in the veins of your legs (venous insufficiency)  · Dilated veins in your lower leg (varicose veins)  · Garters or other clothing that is tight on your legs. This will cause blood to pool in your legs because the clothing limits blood flow.  · Some medicines such as hormones like birth control pills, some blood pressure medicines like calcium channel blockers (amlodipine) and steroids, some antidepressants like MAO inhibitors and tricyclics  · Menstrual periods that cause you to retain fluids  · Many types of renal disease  · Liver failure or cirrhosis  · Pregnancy, some swelling is normal, but a sudden increase in leg swelling or weight gain can be a sign of a dangerous complication of pregnancy  · Poor nutrition  · Thyroid disease  Medical treatment will depend on what is causing the swelling in your legs. Your healthcare provider may prescribe water pills (diuretics) to get rid of the extra fluid.  Home care  Follow these guidelines when caring for yourself at home:  · Don't wear clothing like garters that is tight on your legs.  · Keep your legs up while lying or sitting.  · If infection, injury, or recent surgery is causing the swelling, stay off your legs as much as possible until symptoms get better.  · If your healthcare provider says that your leg swelling is caused by venous insufficiency or varicose veins, don't sit or  one place for long periods of time. Take breaks and walk about every few hours. Brisk walking is a good exercise. It helps  circulate the blood that has collected in your leg. Talk with your provider about using support stockings to stop daytime leg swelling.  · If your provider says that heart disease is causing your leg swelling, follow a low-salt diet to stop extra fluid from staying in your body. You may also need medicine.  Follow-up care  Follow up with your healthcare provider, or as advised.  When to seek medical advice  Call your healthcare provider right away if any of these occur:  · New shortness of breath or chest pain  · Shortness of breath or chest pain that gets worse  · Swelling in both legs or ankles that gets worse  · Swelling of the abdomen  · Redness, warmth, or swelling in one leg  · Fever of 100.4ºF (38ºC) or higher, or as directed by your healthcare provider  · Yellow color to your skin or eyes  · Rapid, unexplained weight gain  · Having to sleep upright or use an increased number of pillows  Date Last Reviewed: 3/31/2016  © 9668-2557 [employer] [address] [place] [address], [place] [address]. All rights reserved. This information is not intended as a substitute for professional medical care. Always follow your healthcare professional's instructions.

## 2018-05-25 ENCOUNTER — OFFICE VISIT (OUTPATIENT)
Dept: URGENT CARE | Facility: CLINIC | Age: 48
End: 2018-05-25
Payer: MEDICAID

## 2018-05-25 VITALS
TEMPERATURE: 99 F | HEART RATE: 99 BPM | OXYGEN SATURATION: 96 % | HEIGHT: 66 IN | SYSTOLIC BLOOD PRESSURE: 119 MMHG | WEIGHT: 227 LBS | BODY MASS INDEX: 36.48 KG/M2 | DIASTOLIC BLOOD PRESSURE: 75 MMHG

## 2018-05-25 DIAGNOSIS — H10.9 BACTERIAL CONJUNCTIVITIS OF RIGHT EYE: ICD-10-CM

## 2018-05-25 DIAGNOSIS — J01.40 ACUTE NON-RECURRENT PANSINUSITIS: Primary | ICD-10-CM

## 2018-05-25 PROCEDURE — 99213 OFFICE O/P EST LOW 20 MIN: CPT | Mod: S$GLB,,, | Performed by: NURSE PRACTITIONER

## 2018-05-25 RX ORDER — AZITHROMYCIN 250 MG/1
TABLET, FILM COATED ORAL
Qty: 6 TABLET | Refills: 0 | Status: SHIPPED | OUTPATIENT
Start: 2018-05-25 | End: 2018-05-30

## 2018-05-25 RX ORDER — POLYMYXIN B SULFATE AND TRIMETHOPRIM 1; 10000 MG/ML; [USP'U]/ML
1 SOLUTION OPHTHALMIC EVERY 4 HOURS
Qty: 10 ML | Refills: 0 | Status: SHIPPED | OUTPATIENT
Start: 2018-05-25 | End: 2018-06-01

## 2018-05-25 NOTE — PATIENT INSTRUCTIONS
Please follow up with your primary care provider if you are not feeling better in 7-10 days.    Please drink plenty of fluids.  Please get plenty of rest.    Please return here or go to the Emergency Department for any concerns or worsening of condition.    If you were prescribed antibiotics, please take them to completion.    If you were prescribed a narcotic medication, do not drive or operate heavy equipment or machinery while taking these medications.    If you were given a steroid shot in the clinic and have also been given a prescription for a steroid such as Prednisone or a Medrol Dose Pack, please begin taking them tomorrow.    If you do not have Hypertension or any history of palpitations, it is ok to take over the counter Sudafed or Mucinex D or Allegra-D or Claritin-D or Zyrtec-D.  If you do take one of the above, it is ok to combine that with plain over the counter Mucinex or Allegra or Claritin or Zyrtec.  If for example you are taking Zyrtec -D, you can combine that with Mucinex, but not Mucinex-D.  If you are taking Mucinex-D, you can combine that with plain Allegra or Claritin or Zyrtec.     If you do have Hypertension or palpitations, it is safe to take Coricidin HBP or Mucinex DM for relief of congestion and cough. Take as directed on bottle with at least 2 glasses of water.    If not allergic, please take over the counter Tylenol (Acetaminophen) and/or Motrin (Ibuprofen) as directed on bottle for control of pain and/or fever.    Please follow up with your primary care doctor or specialist as needed.    If you  smoke, please stop smoking.    Conjunctivitis, Bacterial    You have an infection in the membranes covering the white part of the eye. This part of the eye is called the conjunctiva. The infection is called conjunctivitis. The most common symptoms of conjunctivitis include a thick, pus-like discharge from the eye, swollen eyelids, redness, eyelids sticking together upon awakening, and a  gritty or scratchy feeling in the eye. Your infection was caused by bacteria. It may be treated with medicine. With treatment, the infection takes about 7 to 10 days to resolve.  Home care  · Use prescribed antibiotic eye drops or ointment as directed to treat the infection.  · Apply a warm compress (towel soaked in warm water) to the affected eye 3 to 4 times a day. Do this just before applying medicine to the eye.  · Use a warm, wet cloth to wipe away crusting of the eyelids in the morning. This is caused by mucus drainage during the night. You may also use saline irrigating solution or artificial tears to rinse away mucus in the eye. Do not put a patch over the eye.  · Wash your hands before and after touching the infected eye. This is to prevent spreading the infection to the other eye, and to other people. Do not share your towels or washcloths with others.  · You may use acetaminophen or ibuprofen to control pain, unless another medicine was prescribed. (Note: If you have chronic liver or kidney disease or have ever had a stomach ulcer or gastrointestinal bleeding, talk with your doctor before using these medicines.)  · Do not wear contact lenses until your eyes have healed and all symptoms are gone.  Follow-up care  Follow up with your healthcare provider, or as advised.  When to seek medical advice  Call your healthcare provider right away if any of these occur:  · Worsening vision  · Increasing pain in the eye  · Increasing swelling or redness of the eyelid  · Redness spreading around the eye  Date Last Reviewed: 6/14/2015  © 8313-6258 [employer]. [address] [place] [address], [place] [address]. All rights reserved. This information is not intended as a substitute for professional medical care. Always follow your healthcare professional's instructions.        Sinusitis (Antibiotic Treatment)    The sinuses are air-filled spaces within the bones of the face. They connect to the inside of the  nose. Sinusitis is an inflammation of the tissue lining the sinus cavity. Sinus inflammation can occur during a cold. It can also be due to allergies to pollens and other particles in the air. Sinusitis can cause symptoms of sinus congestion and fullness. A sinus infection causes fever, headache and facial pain. There is often green or yellow drainage from the nose or into the back of the throat (post-nasal drip). You have been given antibiotics to treat this condition.  Home care:  · Take the full course of antibiotics as instructed. Do not stop taking them, even if you feel better.  · Drink plenty of water, hot tea, and other liquids. This may help thin mucus. It also may promote sinus drainage.  · Heat may help soothe painful areas of the face. Use a towel soaked in hot water. Or,  the shower and direct the hot spray onto your face. Using a vaporizer along with a menthol rub at night may also help.   · An expectorant containing guaifenesin may help thin the mucus and promote drainage from the sinuses.  · Over-the-counter decongestants may be used unless a similar medicine was prescribed. Nasal sprays work the fastest. Use one that contains phenylephrine or oxymetazoline. First blow the nose gently. Then use the spray. Do not use these medicines more often than directed on the label or symptoms may get worse. You may also use tablets containing pseudoephedrine. Avoid products that combine ingredients, because side effects may be increased. Read labels. You can also ask the pharmacist for help. (NOTE: Persons with high blood pressure should not use decongestants. They can raise blood pressure.)  · Over-the-counter antihistamines may help if allergies contributed to your sinusitis.    · Do not use nasal rinses or irrigation during an acute sinus infection, unless told to by your health care provider. Rinsing may spread the infection to other sinuses.  · Use acetaminophen or ibuprofen to control pain, unless  another pain medicine was prescribed. (If you have chronic liver or kidney disease or ever had a stomach ulcer, talk with your doctor before using these medicines. Aspirin should never be used in anyone under 18 years of age who is ill with a fever. It may cause severe liver damage.)  · Don't smoke. This can worsen symptoms.  Follow-up care  Follow up with your healthcare provider or our staff if you are not improving within the next week.  When to seek medical advice  Call your healthcare provider if any of these occur:  · Facial pain or headache becoming more severe  · Stiff neck  · Unusual drowsiness or confusion  · Swelling of the forehead or eyelids  · Vision problems, including blurred or double vision  · Fever of 100.4ºF (38ºC) or higher, or as directed by your healthcare provider  · Seizure  · Breathing problems  · Symptoms not resolving within 10 days  Date Last Reviewed: 4/13/2015  © 0259-8273 [employer]. [address] [place] [address], [place] [address]. All rights reserved. This information is not intended as a substitute for professional medical care. Always follow your healthcare professional's instructions.

## 2018-05-25 NOTE — PROGRESS NOTES
"Subjective:       Patient ID: Audrey Natarajan is a 47 y.o. female.    Vitals:  height is 5' 6" (1.676 m) and weight is 103 kg (227 lb). Her temperature is 99 °F (37.2 °C). Her blood pressure is 119/75 and her pulse is 99. Her oxygen saturation is 96%.     Chief Complaint: Sinus Problem and Eye Problem    Sinus Problem   This is a new problem. The current episode started in the past 7 days. The problem has been gradually worsening since onset. There has been no fever. Associated symptoms include congestion, coughing, headaches, a hoarse voice and sinus pressure. Pertinent negatives include no chills, ear pain, shortness of breath or sore throat. Treatments tried: waldryl  The treatment provided mild relief.   Eye Problem    The right eye is affected. This is a new problem. The current episode started today. The problem occurs constantly. The problem has been unchanged. The injury mechanism is unknown. The patient is experiencing no pain. Associated symptoms include eye redness and itching. Pertinent negatives include no eye discharge, fever or nausea. She has tried water for the symptoms.     Review of Systems   Constitution: Negative for chills, fever and malaise/fatigue.   HENT: Positive for congestion, hoarse voice and sinus pressure. Negative for ear pain and sore throat.    Eyes: Positive for itching and redness. Negative for discharge.   Cardiovascular: Negative for chest pain, dyspnea on exertion and leg swelling.   Respiratory: Positive for cough and sputum production. Negative for shortness of breath and wheezing.    Musculoskeletal: Negative for myalgias.   Gastrointestinal: Negative for abdominal pain and nausea.   Neurological: Positive for headaches.   All other systems reviewed and are negative.      Objective:      Physical Exam   Constitutional: She is oriented to person, place, and time. Vital signs are normal. She appears well-developed and well-nourished. She is cooperative.  Non-toxic " appearance. She does not have a sickly appearance. She does not appear ill. No distress.   HENT:   Head: Normocephalic and atraumatic.   Right Ear: Hearing, tympanic membrane, external ear and ear canal normal.   Left Ear: Hearing, tympanic membrane, external ear and ear canal normal.   Nose: Mucosal edema and rhinorrhea present. Right sinus exhibits maxillary sinus tenderness and frontal sinus tenderness. Left sinus exhibits maxillary sinus tenderness and frontal sinus tenderness.   Mouth/Throat: Uvula is midline and mucous membranes are normal. Posterior oropharyngeal edema and posterior oropharyngeal erythema present.   Eyes: EOM and lids are normal. Pupils are equal, round, and reactive to light. Lids are everted and swept, no foreign bodies found. Right eye exhibits discharge. Right eye exhibits no chemosis, no exudate and no hordeolum. No foreign body present in the right eye. Left eye exhibits no chemosis, no discharge, no exudate and no hordeolum. No foreign body present in the left eye. Right conjunctiva is injected. Right conjunctiva has no hemorrhage. Left conjunctiva is not injected. Left conjunctiva has no hemorrhage.   Slit lamp exam:       The right eye shows no corneal abrasion, no corneal ulcer, no fluorescein uptake and no anterior chamber bulge.   Neck: Normal range of motion and full passive range of motion without pain. Neck supple. No neck rigidity. No edema, no erythema and normal range of motion present.   Cardiovascular: Normal rate, regular rhythm and normal heart sounds.    Pulmonary/Chest: Effort normal and breath sounds normal. No accessory muscle usage. No apnea, no tachypnea and no bradypnea. No respiratory distress. She has no decreased breath sounds. She has no wheezes. She has no rhonchi. She has no rales.   Positive cough   Abdominal: Normal appearance.   Lymphadenopathy:        Head (right side): No submental, no submandibular, no tonsillar, no preauricular, no posterior auricular  and no occipital adenopathy present.        Head (left side): No submental, no submandibular, no tonsillar, no preauricular, no posterior auricular and no occipital adenopathy present.     She has cervical adenopathy.        Right cervical: Superficial cervical adenopathy present.        Left cervical: Superficial cervical adenopathy present.   Neurological: She is alert and oriented to person, place, and time.   Psychiatric: She has a normal mood and affect. Her speech is normal and behavior is normal.   Nursing note and vitals reviewed.      Assessment:       1. Acute non-recurrent pansinusitis    2. Bacterial conjunctivitis of right eye        Plan:         Acute non-recurrent pansinusitis  -     azithromycin (ZITHROMAX) 250 MG tablet; Take 2 tablets (500 mg) on  Day 1,  followed by 1 tablet (250 mg) once daily on Days 2 through 5.  Dispense: 6 tablet; Refill: 0  -     dextromethorphan-guaifenesin  mg (MUCINEX DM)  mg per 12 hr tablet; Take 1 tablet by mouth 2 (two) times daily as needed.  Dispense: 36 tablet; Refill: 0    Bacterial conjunctivitis of right eye  -     polymyxin B sulf-trimethoprim (POLYTRIM) 10,000 unit- 1 mg/mL Drop; Place 1 drop into the right eye every 4 (four) hours.  Dispense: 10 mL; Refill: 0      Instructed pt to follow up with pcp for no improvement in 7-10 days or for any worsening.

## 2018-05-31 DIAGNOSIS — J44.1 COPD EXACERBATION: ICD-10-CM

## 2018-06-06 ENCOUNTER — CLINICAL SUPPORT (OUTPATIENT)
Dept: SMOKING CESSATION | Facility: CLINIC | Age: 48
End: 2018-06-06
Payer: COMMERCIAL

## 2018-06-06 DIAGNOSIS — F17.200 NICOTINE DEPENDENCE: Primary | ICD-10-CM

## 2018-06-06 PROCEDURE — 99404 PREV MED CNSL INDIV APPRX 60: CPT | Mod: S$GLB,,,

## 2018-06-06 PROCEDURE — 99999 PR PBB SHADOW E&M-EST. PATIENT-LVL I: CPT | Mod: PBBFAC,,,

## 2018-06-06 RX ORDER — IBUPROFEN 200 MG
1 TABLET ORAL DAILY
Qty: 14 PATCH | Refills: 0 | Status: SHIPPED | OUTPATIENT
Start: 2018-06-06 | End: 2018-06-20

## 2018-06-06 NOTE — Clinical Note
Patient will be participating in biweekly tobacco cessation meetings and will begin the prescribed tobacco cessation medication regime of  21 mg nicotine patch QD .  He/She currently smokes () cigarettes per day.  Pt started on rate reduction and wait time of 15 min prior to smoking. Pt's exhaled carbon monoxide level was 56  ppm as per [employer]. (non- smoker = 0-5 ppm.) Will see pt back in office next week.

## 2018-06-13 ENCOUNTER — OFFICE VISIT (OUTPATIENT)
Dept: PODIATRY | Facility: CLINIC | Age: 48
End: 2018-06-13
Payer: MEDICAID

## 2018-06-13 ENCOUNTER — CLINICAL SUPPORT (OUTPATIENT)
Dept: SMOKING CESSATION | Facility: CLINIC | Age: 48
End: 2018-06-13
Payer: COMMERCIAL

## 2018-06-13 ENCOUNTER — LAB VISIT (OUTPATIENT)
Dept: LAB | Facility: HOSPITAL | Age: 48
End: 2018-06-13
Attending: INTERNAL MEDICINE
Payer: MEDICAID

## 2018-06-13 VITALS
SYSTOLIC BLOOD PRESSURE: 100 MMHG | HEIGHT: 66 IN | DIASTOLIC BLOOD PRESSURE: 50 MMHG | BODY MASS INDEX: 36.49 KG/M2 | WEIGHT: 227.06 LBS

## 2018-06-13 DIAGNOSIS — M76.72 PERONEUS BREVIS TENDONITIS, LEFT: ICD-10-CM

## 2018-06-13 DIAGNOSIS — E11.49 TYPE II DIABETES MELLITUS WITH NEUROLOGICAL MANIFESTATIONS: ICD-10-CM

## 2018-06-13 DIAGNOSIS — E11.9 TYPE 2 DIABETES MELLITUS WITHOUT COMPLICATION, WITHOUT LONG-TERM CURRENT USE OF INSULIN: ICD-10-CM

## 2018-06-13 DIAGNOSIS — M20.5X2 HALLUX LIMITUS, ACQUIRED, LEFT: ICD-10-CM

## 2018-06-13 DIAGNOSIS — M20.42 HAMMER TOES OF BOTH FEET: ICD-10-CM

## 2018-06-13 DIAGNOSIS — S93.402A INVERSION SPRAIN OF ANKLE, LEFT, INITIAL ENCOUNTER: ICD-10-CM

## 2018-06-13 DIAGNOSIS — E11.9 COMPREHENSIVE DIABETIC FOOT EXAMINATION, TYPE 2 DM, ENCOUNTER FOR: Primary | ICD-10-CM

## 2018-06-13 DIAGNOSIS — F17.200 NICOTINE DEPENDENCE: Primary | ICD-10-CM

## 2018-06-13 DIAGNOSIS — M20.5X1 HALLUX LIMITUS, ACQUIRED, RIGHT: ICD-10-CM

## 2018-06-13 DIAGNOSIS — M20.41 HAMMER TOES OF BOTH FEET: ICD-10-CM

## 2018-06-13 LAB
ALBUMIN SERPL BCP-MCNC: 3.6 G/DL
ALP SERPL-CCNC: 85 U/L
ALT SERPL W/O P-5'-P-CCNC: 15 U/L
ANION GAP SERPL CALC-SCNC: 8 MMOL/L
AST SERPL-CCNC: 11 U/L
BASOPHILS # BLD AUTO: 0.08 K/UL
BASOPHILS NFR BLD: 0.6 %
BILIRUB SERPL-MCNC: 0.2 MG/DL
BUN SERPL-MCNC: 12 MG/DL
CALCIUM SERPL-MCNC: 9.3 MG/DL
CHLORIDE SERPL-SCNC: 104 MMOL/L
CO2 SERPL-SCNC: 29 MMOL/L
CREAT SERPL-MCNC: 0.7 MG/DL
DIFFERENTIAL METHOD: ABNORMAL
EOSINOPHIL # BLD AUTO: 0.7 K/UL
EOSINOPHIL NFR BLD: 5.6 %
ERYTHROCYTE [DISTWIDTH] IN BLOOD BY AUTOMATED COUNT: 15.5 %
EST. GFR  (AFRICAN AMERICAN): >60 ML/MIN/1.73 M^2
EST. GFR  (NON AFRICAN AMERICAN): >60 ML/MIN/1.73 M^2
ESTIMATED AVG GLUCOSE: 160 MG/DL
GLUCOSE SERPL-MCNC: 156 MG/DL
HBA1C MFR BLD HPLC: 7.2 %
HCT VFR BLD AUTO: 41.1 %
HGB BLD-MCNC: 13.3 G/DL
LYMPHOCYTES # BLD AUTO: 5.6 K/UL
LYMPHOCYTES NFR BLD: 42.9 %
MCH RBC QN AUTO: 28.3 PG
MCHC RBC AUTO-ENTMCNC: 32.4 G/DL
MCV RBC AUTO: 87 FL
MONOCYTES # BLD AUTO: 1.1 K/UL
MONOCYTES NFR BLD: 8.3 %
NEUTROPHILS # BLD AUTO: 5.6 K/UL
NEUTROPHILS NFR BLD: 42.4 %
PLATELET # BLD AUTO: 487 K/UL
PMV BLD AUTO: 10.6 FL
POTASSIUM SERPL-SCNC: 4.9 MMOL/L
PROT SERPL-MCNC: 6.7 G/DL
RBC # BLD AUTO: 4.7 M/UL
SODIUM SERPL-SCNC: 141 MMOL/L
WBC # BLD AUTO: 13.16 K/UL

## 2018-06-13 PROCEDURE — 99213 OFFICE O/P EST LOW 20 MIN: CPT | Mod: PBBFAC,PO | Performed by: PODIATRIST

## 2018-06-13 PROCEDURE — 80053 COMPREHEN METABOLIC PANEL: CPT

## 2018-06-13 PROCEDURE — 99204 OFFICE O/P NEW MOD 45 MIN: CPT | Mod: S$PBB,,, | Performed by: PODIATRIST

## 2018-06-13 PROCEDURE — 36415 COLL VENOUS BLD VENIPUNCTURE: CPT | Mod: PN

## 2018-06-13 PROCEDURE — 99999 PR PBB SHADOW E&M-EST. PATIENT-LVL I: CPT | Mod: PBBFAC,,,

## 2018-06-13 PROCEDURE — 83036 HEMOGLOBIN GLYCOSYLATED A1C: CPT

## 2018-06-13 PROCEDURE — 85025 COMPLETE CBC W/AUTO DIFF WBC: CPT

## 2018-06-13 PROCEDURE — 99404 PREV MED CNSL INDIV APPRX 60: CPT | Mod: S$GLB,,,

## 2018-06-13 PROCEDURE — 99999 PR PBB SHADOW E&M-EST. PATIENT-LVL III: CPT | Mod: PBBFAC,,, | Performed by: PODIATRIST

## 2018-06-13 RX ORDER — DM/P-EPHED/ACETAMINOPH/DOXYLAM 30-7.5/3
LIQUID (ML) ORAL
Qty: 168 LOZENGE | Refills: 0 | Status: SHIPPED | OUTPATIENT
Start: 2018-06-13 | End: 2018-08-20 | Stop reason: SDUPTHER

## 2018-06-13 NOTE — PATIENT INSTRUCTIONS
Understanding Ankle Sprain    The ankle is the joint where the leg and foot meet. Bones are held in place by connective tissue called ligaments. When ankle ligaments are stretched to the point of pain and injury, it is called an ankle sprain. A sprain can tear the ligaments. These tears can be very small but still cause pain. Ankle sprains can be mild or severe.  What causes an ankle sprain?  A sprain may occur when you twist your ankle or bend it too far. This can happen when you stumble or fall. Things that can make an ankle sprain more likely include:  · Having had an ankle sprain before  · Playing sports that involve running and jumping. Or playing contact sports such as football or hockey.  · Wearing shoes that dont support your feet and ankles well  · Having ankles with poor strength and flexibility  Symptoms of an ankle sprain  Symptoms may include:  · Pain or soreness in the ankle  · Swelling  · Redness or bruising  · Not being able to walk or put weight on the affected foot  · Reduced range of motion in the ankle  · A popping or tearing feeling at the time the sprain occurs  · An abnormal or dislocated look to the ankle  · Instability or too much range of motion in the ankle  Treatment for an ankle sprain  Treatment focuses on reducing pain and swelling, and avoiding further injury. Treatments may include:  · Resting the ankle. Avoid putting weight on it. This may mean using crutches until the sprain heals.  · Prescription or over-the-counter pain medicines. These help reduce swelling and pain.  · Cold packs. These help reduce pain and swelling.  · Raising your ankle above your heart. This helps reduce swelling.  · Wrapping the ankle with an elastic bandage or ankle brace. This helps reduce swelling and gives some support to the ankle. In rare cases, you may need a cast or boot.  · Stretching and other exercises. These improve flexibility and strength.  · Heat packs. These may be recommended before doing  ankle exercises.  Possible complications of an ankle sprain  An ankle that has been weakened by a sprain can be more likely to have repeated sprains afterward. Doing exercises to strengthen your ankle and improve balance can reduce your risk for repeated sprains. Other possible complications are long-term (chronic) pain or an ankle that remains unstable.  When to call your healthcare provider  Call your healthcare provider right away if you have any of these:  · Fever of 100.4°F (38°C) or higher, or as directed  · Pain, numbness, discoloration, or coldness in the foot or toes  · Pain that gets worse  · Symptoms that dont get better, or get worse  · New symptoms   Date Last Reviewed: 3/10/2016  © 5719-9527 [employer]. [address] [place] [address], [place] [address]. All rights reserved. This information is not intended as a substitute for professional medical care. Always follow your healthcare professional's instructions.        Treating Ankle Sprains  Treatment will depend on how bad your sprain is. For a severe sprain, healing may take 3 months or more.  Right after your injury: Use R.I.C.E.  · Rest: At first, keep weight off the ankle as much as you can. You may be given crutches to help you walk without putting weight on the ankle.  · Ice: Put an ice pack on the ankle for 15 minutes. Remove the pack and wait at least 30 minutes. Repeat for up to 3 days. This helps reduce swelling.  · Compression: To reduce swelling and keep the joint stable, you may need to wrap the ankle with an elastic bandage. For more severe sprains, you may need an ankle brace or a cast.  · Elevation: To reduce swelling, keep your ankle raised above your heart when you sit or lie down.  Medicine  Your healthcare provider may suggest oral non-steroidal anti-inflammatory medicine (NSAIDs), such as ibuprofen. This relieves the pain and helps reduce any swelling. Be sure to take your medicine as directed.  Contrast baths  After 3  days, soak your ankle in warm water for 30 seconds, then in cool water for 30 seconds. Go back and forth for 5 minutes. Doing this every 2 hours will help keep the swelling down.  Exercises    After about 2 to 3 weeks, you may be given exercises to strengthen the ligaments and muscles in the ankle. Doing these exercises will help prevent another ankle sprain. Exercises may include standing on your toes and then on your heels and doing ankle curls.  Ankle curls  · Sit on the edge of a sturdy table or lie on your back.  · Pull your toes toward you. Then point them away from you. Repeat for 2 to 3 minutes.   Date Last Reviewed: 9/28/2015  © 9454-1017 [employer]. [address] [place] [address], [place] [address]. All rights reserved. This information is not intended as a substitute for professional medical care. Always follow your healthcare professional's instructions.      Recommend lotions: eucerin, eucerin for diabetics, aquaphor, A&D ointment, gold bond for diabetics, sween, Rutland's Bees all purpose baby ointment,  urea 40 with aloe (found on amazon.com)    Shoe recommendations: (try 6pm.CityHook, zappos.CityHook , hearo.fm, or shoes.[employer] for discounted prices) you can visit DSW shoes in [place]  or [facility] in the [facility] (there are also several shoe brand outlets in the [facility])    Asics (GT 2000 or gel foundations), new balance stability type shoes, yanira (stabil c3),  Jose Guadalupe (GTS or Beast or transcend), vionic, propet (tennis shoe)    sofft brand, clarks, crocs, aerosoles, naturalizers, SAS, ecco, born, jason gurrola, rockports (dress shoes)    Vionic, burkenstocks, fitflops, propet (sandals)  Devyne comfort thong sandals, crocs, propet (house shoes)    Nail Home remedy:  Vicks Vapor rub to nails for easier managability    Occasional soaks for 15-20 mins in luke warm water with 1 cup of listerine and 1 cup of apple cider vinegar are ok You may add several drops of oil of oregano or tea tree oil as  well        Diabetes: Inspecting Your Feet  Diabetes increases your chances of developing foot problems. So inspect your feet every day. This helps you find small skin irritations before they become serious infections. If you have trouble seeing the bottoms of your feet, use a mirror or ask a family member or friend to help.     Pressure spots on the bottom of the foot are common areas where problems develop.   How to check your feet  Below are tips to help you look for foot problems. Try to check your feet at the same time each day, such as when you get out of bed in the morning:  · Check the top of each foot. The tops of toes, back of the heel, and outer edge of the foot can get a lot of rubbing from poor-fitting shoes.  · Check the bottom of each foot. Daily wear and tear often leads to problems at pressure spots.  · Check the toes and nails. Fungal infections often occur between toes. Toenail problems can also be a sign of fungal infections or lead to breaks in the skin.  · Check your shoes, too. Loose objects inside a shoe can injure the foot. Use your hand to feel inside your shoes for things like tami, loose stitching, or rough areas that could irritate your skin.  Warning signs  Look for any color changes in the foot. Redness with streaks can signal a severe infection, which needs immediate medical attention. Tell your doctor right away if you have any of these problems:  · Swelling, sometimes with color changes, may be a sign of poor blood flow or infection. Symptoms include tenderness and an increase in the size of your foot.  · Warm or hot areas on your feet may be signs of infection. A foot that is cold may not be getting enough blood.  · Sensations such as burning, tingling, or pins and needles can be signs of a problem. Also check for areas that may be numb.  · Hot spots are caused by friction or pressure. Look for hot spots in areas that get a lot of rubbing. Hot spots can turn into blisters,  calluses, or sores.  · Cracks and sores are caused by dry or irritated skin. They are a sign that the skin is breaking down, which can lead to infection.  · Toenail problems to watch for include nails growing into the skin (ingrown toenail) and causing redness or pain. Thick, yellow, or discolored nails can signal a fungal infection.  · Drainage and odor can develop from untreated sores and ulcers. Call your doctor right away if you notice white or yellow drainage, bleeding, or unpleasant odor.   © 5068-8060 [employer]. [address] [place] [address]. All rights reserved. This information is not intended as a substitute for professional medical care. Always follow your healthcare professional's instructions.        Step-by-Step:  Inspecting Your Feet (Diabetes)    Date Last Reviewed: 10/1/2016  © 2279-1612 [employer]. [address] [place] [address]. All rights reserved. This information is not intended as a substitute for professional medical care. Always follow your healthcare professional's instructions.

## 2018-06-13 NOTE — PROGRESS NOTES
rSubjective:      Patient ID: Audrey Natarajan is a 47 y.o. female.    Chief Complaint: Foot Pain (Left foot worse that right/ top and bottom) and Foot Swelling (Left )    Audrey is a 47 y.o. female who presents to the clinic for evaluation and treatment of high risk feet. Audrey has a past medical history of ADHD (attention deficit hyperactivity disorder); Arthritis; Asthma; Bipolar 1 disorder; COPD (chronic obstructive pulmonary disease); Coronary artery disease; Depression; Diabetes mellitus; DVT of lower extremity, bilateral (July 2013); Encounter for blood transfusion; History of blood clots (1. Left Leg=2003; 2.Bilateral Groin=Blood Clots= 5 or 6/ 2013 & 7/2013; 3. LLL of Lung=7/2013;  4. Lt. Lower Leg=7/2013. ); HTN (hypertension) (6/6/2013); Hypercholesteremia; Irregular heartbeat; Neuromuscular disorder; PE (pulmonary embolism) (July 2013 ); and Restless leg syndrome. The patient's chief complaint is pain and swelling of the left foot ans ankle. Description: moderate and worsening Nature: aching and throbbing Location: lateral Onset of the symptoms was several months ago. Precipitating event: increased activity.  History of injury:  fall Current symptoms include: pain at the lateral aspect of the ankle, pain with inversion of the foot, swelling and worsening symptoms after a period of activity. Aggravating factors: any weight bearing. Alleviating factors: ice and rest Symptoms have gradually worsened. Patient has had no prior foot problems. Evaluation to date: referred from PCP. Treatment to date: increased lasix Patients rates pain 8/10 on pain scale.   This patient has documented high risk feet requiring routine maintenance secondary to diabetes mellitis and those secondary complications of diabetes, as mentioned..        PCP: Donaldo Pena MD    Date Last Seen by PCP:   Chief Complaint   Patient presents with    Foot Pain     Left foot worse that right/ top and bottom    Foot Swelling     Left           Current shoe gear:  Affected Foot: Slip-on shoes     Unaffected Foot: Slip-on shoes    Hemoglobin A1C   Date Value Ref Range Status   06/13/2018 7.2 (H) 4.0 - 5.6 % Final     Comment:     ADA Screening Guidelines:  5.7-6.4%  Consistent with prediabetes  >or=6.5%  Consistent with diabetes  High levels of fetal hemoglobin interfere with the HbA1C  assay. Heterozygous hemoglobin variants (HbS, HgC, etc)do  not significantly interfere with this assay.   However, presence of multiple variants may affect accuracy.     02/26/2018 6.1 (H) 4.0 - 5.6 % Final     Comment:     According to ADA guidelines, hemoglobin A1c <7.0% represents  optimal control in non-pregnant diabetic patients. Different  metrics may apply to specific patient populations.   Standards of Medical Care in Diabetes-2016.  For the purpose of screening for the presence of diabetes:  <5.7%     Consistent with the absence of diabetes  5.7-6.4%  Consistent with increasing risk for diabetes   (prediabetes)  >or=6.5%  Consistent with diabetes  Currently, no consensus exists for use of hemoglobin A1c  for diagnosis of diabetes for children.  This Hemoglobin A1c assay has significant interference with fetal   hemoglobin   (HbF). The results are invalid for patients with abnormal amounts of   HbF,   including those with known Hereditary Persistence   of Fetal Hemoglobin. Heterozygous hemoglobin variants (HbAS, HbAC,   HbAD, HbAE, HbA2) do not significantly interfere with this assay;   however, presence of multiple variants in a sample may impact the %   interference.     11/06/2017 6.1 (H) 4.0 - 5.6 % Final     Comment:     According to ADA guidelines, hemoglobin A1c <7.0% represents  optimal control in non-pregnant diabetic patients. Different  metrics may apply to specific patient populations.   Standards of Medical Care in Diabetes-2016.  For the purpose of screening for the presence of diabetes:  <5.7%     Consistent with the absence of  diabetes  5.7-6.4%  Consistent with increasing risk for diabetes   (prediabetes)  >or=6.5%  Consistent with diabetes  Currently, no consensus exists for use of hemoglobin A1c  for diagnosis of diabetes for children.  This Hemoglobin A1c assay has significant interference with fetal   hemoglobin   (HbF). The results are invalid for patients with abnormal amounts of   HbF,   including those with known Hereditary Persistence   of Fetal Hemoglobin. Heterozygous hemoglobin variants (HbAS, HbAC,   HbAD, HbAE, HbA2) do not significantly interfere with this assay;   however, presence of multiple variants in a sample may impact the %   interference.             Patient Active Problem List   Diagnosis    Hypertension    COPD (chronic obstructive pulmonary disease)    Paroxysmal atrial fibrillation    Hypertriglyceridemia    Tobacco abuse    Mild protein malnutrition    Diabetic neuropathy    Leg swelling    Incisional hernia without mention of obstruction or gangrene    Type 2 diabetes mellitus without complication, without long-term current use of insulin    History of DVT (deep vein thrombosis)    History of pulmonary embolus (PE)    Bipolar 1 disorder    Gastroparesis due to DM    Chronic respiratory failure with hypoxia    Thrombocytosis    Leukocytosis    Morbid obesity    Type 2 diabetes mellitus    Acne    Chronic left-sided low back pain with sciatica    Chronic left-sided low back pain without sciatica    Weakness of left lower extremity    Decreased range of motion of lumbar spine    Other chronic pain    Vomiting and diarrhea    Pneumonia of left lower lobe due to infectious organism       Current Outpatient Prescriptions on File Prior to Visit   Medication Sig Dispense Refill    albuterol (VENTOLIN HFA) 90 mcg/actuation inhaler INHALE 2 PUFFS BY MOUTH INTO THE LUNGS EVERY 6 HOURS AS NEEDED FOR WHEEZING. RESCUE. 18 g 2    aspirin (ECOTRIN) 81 MG EC tablet Take 81 mg by mouth.      b  complex vitamins tablet Take 1 tablet by mouth 2 (two) times daily.       blood sugar diagnostic Strp To check BG once daily, to use with insurance preferred meter 30 each 5    blood-glucose meter kit To check BG once daily, to use with insurance preferred meter 1 each 0    carbamazepine (TEGRETOL) 200 mg tablet TK 2 TS PO BID  1    clonazePAM (KLONOPIN) 1 MG tablet TK 1 T PO BID PRA AND INSOMNIA  1    cyanocobalamin (VITAMIN B-12) 1000 MCG tablet Take 100 mcg by mouth once daily.      fish oil-omega-3 fatty acids 300-1,000 mg capsule Take 2 g by mouth once daily. Instructed to stop 5-7 days before surgery (8/11/16).      fluticasone (FLONASE) 50 mcg/actuation nasal spray 1 spray (50 mcg total) by Each Nare route once daily. 1 Bottle 2    furosemide (LASIX) 20 MG tablet TAKE 1 TABLET BY MOUTH EVERY DAY 30 tablet 2    gabapentin (NEURONTIN) 600 MG tablet TAKE 2 TABLETS(1200 MG) BY MOUTH THREE TIMES DAILY 180 tablet 1    hydrOXYzine HCl (ATARAX) 25 MG tablet TK 1 T PO QD  2    lancets Misc To check BG once daily, to use with insurance preferred meter 30 each 2    lidocaine (LIDODERM) 5 % Place 1 patch onto the skin once daily. Remove & Discard patch within 12 hours or as directed by MD. May use 4% formulation if more affordable for patient. 6 patch 0    lisinopril (PRINIVIL,ZESTRIL) 5 MG tablet TAKE 1 TABLET(5 MG) BY MOUTH EVERY DAY 30 tablet 2    meloxicam (MOBIC) 15 MG tablet Take 1 tablet (15 mg total) by mouth once daily. 90 tablet 1    meloxicam (MOBIC) 7.5 MG tablet Take 1 tablet (7.5 mg total) by mouth once daily. 12 tablet 0    metFORMIN (GLUCOPHAGE) 1000 MG tablet TAKE 1 TABLET(1000 MG) BY MOUTH TWICE DAILY WITH MEALS 60 tablet 2    metoprolol tartrate (LOPRESSOR) 25 MG tablet Take 1 tablet (25 mg total) by mouth once daily. 30 tablet 5    mometasone-formoterol (DULERA) 100-5 mcg/actuation HFAA Inhale 2 puffs into the lungs 2 (two) times daily. 1 Inhaler 0    multivitamin (THERAGRAN) per  "tablet Take 1 tablet by mouth every morning.      mupirocin (BACTROBAN) 2 % ointment EDIE EXT AA BID  3    nicotine (NICODERM CQ) 21 mg/24 hr Place 1 patch onto the skin once daily. 14 patch 0    omeprazole 20 mg TbEC TAKE 2 TABLETS BY MOUTH ONCE DAILY AT 6AM (Patient taking differently: one tablet twice daily) 90 each 1    pravastatin (PRAVACHOL) 40 MG tablet Take 1 tablet (40 mg total) by mouth once daily. 90 tablet 3    risperidone (RISPERDAL) 3 MG Tab take at bedtime  1    risperiDONE (RISPERDAL) 4 MG tablet       VYVANSE 30 mg capsule TK ONE C PO QAM  0     No current facility-administered medications on file prior to visit.        Review of patient's allergies indicates:   Allergen Reactions    Morphine Other (See Comments)     Patient had a psychotic episode after taking Morphine  Agitation, hallucinations    Penicillins Anaphylaxis     itching       Past Surgical History:   Procedure Laterality Date    ABDOMINAL SURGERY      BILATERAL OOPHORECTOMY Bilateral 2015    Green' s filter Right 2012    Right Neck & Tunneled Down.    HERNIA REPAIR      "[place] of Hernias Repaires around th [place].", pt. states    OVARIAN CYST REMOVAL  3/13/2014    OK REMOVAL OF OVARY/TUBE(S)      SPLENECTOMY, TOTAL  2003    TONSILLECTOMY      as a child    TYMPANOSTOMY TUBE PLACEMENT      VEIN SURGERY      Lt leg       Family History   Problem Relation Age of Onset    Hypertension Father     Diabetes Father     Heart disease Father     Cataracts Father     Diabetes Paternal Grandfather     Heart disease Paternal Grandfather     No Known Problems Mother     Ovarian cancer Maternal Grandmother          from this. ? age     No Known Problems Sister     No Known Problems Brother     No Known Problems Maternal Aunt     No Known Problems Maternal Uncle     No Known Problems Paternal Aunt     No Known Problems Paternal Uncle     No Known Problems Maternal Grandfather     " "Ovarian cancer Paternal Grandmother     Uterine cancer Neg Hx     Breast cancer Neg Hx     Colon cancer Neg Hx     Amblyopia Neg Hx     Blindness Neg Hx     Cancer Neg Hx     Glaucoma Neg Hx     Macular degeneration Neg Hx     Retinal detachment Neg Hx     Strabismus Neg Hx     Stroke Neg Hx     Thyroid disease Neg Hx        Social History     Social History    Marital status:      Spouse name: N/A    Number of children: N/A    Years of education: N/A     Occupational History    Not on file.     Social History Main Topics    Smoking status: Current Every Day Smoker     Packs/day: 0.50     Years: 25.00     Types: Cigarettes    Smokeless tobacco: Never Used    Alcohol use No    Drug use: No    Sexual activity: Not Currently     Other Topics Concern    Not on file     Social History Narrative    No narrative on file       Review of Systems   Constitution: Negative for chills, fever and weakness.   Cardiovascular: Positive for leg swelling. Negative for claudication.   Respiratory: Negative for cough and shortness of breath.    Skin: Positive for dry skin and nail changes. Negative for itching and rash.   Musculoskeletal: Positive for arthritis, back pain, joint pain and myalgias. Negative for falls, joint swelling and muscle weakness.   Gastrointestinal: Negative for diarrhea, nausea and vomiting.   Neurological: Positive for numbness and paresthesias. Negative for tremors.   Psychiatric/Behavioral: Negative for altered mental status and hallucinations.           Objective:      Vitals:    06/13/18 1506   BP: (!) 100/50   Weight: 103 kg (227 lb 1.2 oz)   Height: 5' 6" (1.676 m)   PainSc:   8   PainLoc: Foot       Physical Exam   Constitutional:  Non-toxic appearance. She does not have a sickly appearance. No distress.   Cardiovascular:   Pulses:       Dorsalis pedis pulses are 2+ on the right side, and 2+ on the left side.        Posterior tibial pulses are 2+ on the right side, and 2+ on " the left side.   Pulmonary/Chest: No respiratory distress.   Musculoskeletal:        Right ankle: She exhibits decreased range of motion and swelling. No tenderness. No lateral malleolus, no medial malleolus, no AITFL, no CF ligament and no posterior TFL tenderness found. Achilles tendon exhibits no pain, no defect and normal Paniagua's test results.        Left ankle: She exhibits decreased range of motion and swelling. Tenderness. Lateral malleolus, AITFL and CF ligament tenderness found. No medial malleolus, no posterior TFL and no proximal fibula tenderness found. Achilles tendon exhibits no pain, no defect and normal Paniagua's test results.        Right foot: There is no bony tenderness.        Left foot: There is tenderness (Pain on palpation to styloid process region base of 5th metatarsal base and pain with eversion) and swelling.   Biomechanical exam: There is equinus deformity bilateral with decreased dorsiflexion at the ankle joint bilateral. No tenderness with compression of heel. Negative tinels sign. Gait analysis reveals excessive pronation through midstance and propulsion with early heel off. Shoes reveals lateral heel counter wear bilateral     Decreased first MPJ range of motion both weightbearing and nonweightbearing, no crepitus observed the first MP joint, + dorsal flag sign. Mild  bunion deformity is observed .    Patient has hammertoes of digits 2-5 bilateral partially reducible without symptom today.     Neurological: She has normal reflexes. She displays no atrophy and no tremor. No sensory deficit. She exhibits normal muscle tone.   Paresthesias, and hyperesthesia bilateral feet at toes with no clearly identified trigger or source.    Enid-Gilberto 5.07 monofilament is intact bilateral feet. Sharp/dull sensation is also intact Bilateral feet.   Skin: Skin is warm, dry and intact. No bruising, no burn, no laceration, no lesion and no rash noted. She is not diaphoretic. No cyanosis. No  pallor. Nails show no clubbing.   Examination of the skin reveals no evidence of significant rashes, open lesions, suspicious appearing nevi or other concerning lesions.      Psychiatric: Her mood appears not anxious. Her affect is not inappropriate. Her speech is not slurred. She is not combative. She is communicative. She is attentive.   Nursing note reviewed.            Assessment:       Encounter Diagnoses   Name Primary?    Comprehensive diabetic foot examination, type 2 DM, encounter for Yes    Inversion sprain of ankle, left, initial encounter     Peroneus brevis tendonitis, left     Hammer toes of both feet     Hallux limitus, acquired, left     Hallux limitus, acquired, right     Type II diabetes mellitus with neurological manifestations          Plan:       Audrey was seen today for foot pain and foot swelling.    Diagnoses and all orders for this visit:    Comprehensive diabetic foot examination, type 2 DM, encounter for  -     DIABETIC SHOES FOR HOME USE    Inversion sprain of ankle, left, initial encounter  -     DIABETIC SHOES FOR HOME USE  -     X-Ray Ankle Complete Left; Future    Peroneus brevis tendonitis, left  -     DIABETIC SHOES FOR HOME USE  -     X-Ray Ankle Complete Left; Future    Hammer toes of both feet  -     DIABETIC SHOES FOR HOME USE    Hallux limitus, acquired, left  -     DIABETIC SHOES FOR HOME USE    Hallux limitus, acquired, right  -     DIABETIC SHOES FOR HOME USE    Type II diabetes mellitus with neurological manifestations      I counseled the patient on her conditions, their implications and medical management.      Greater than 50% of this visit spent on counseling and coordination of care.    Greater than 20 minutes spent on education about the diabetic foot, neuropathy, and prevention of limb loss.    Shoe inspection. Diabetic Foot Education. Patient reminded of the importance of good nutrition/healthy diet/weight management and blood sugar control to help prevent  podiatric complications of diabetes. Patient instructed on proper foot hygeine. Wear comfortable, proper fitting shoes. Wash feet daily. Dry well. After drying, apply moisturizer to feet (no lotion to webspaces). Inspect feet daily for skin breaks, blisters, swelling, or redness. Wear cotton socks (preferably white)  Change socks every day. Do NOT walk barefoot. Do NOT use heating pads or hot water soaks. We discussed wearing proper shoe gear, daily foot inspections, never walking without protective shoe gear.     Discussed edema control and the continued use of compression stockings.     Discussed importance of daily moisturizer to the feet such as Gold bonds diabetic foot cream    Rx diabetic shoes for protection and support    - Tubigrips to BLE; patient is to elevate legs. When sleeping, place a pillow under lower extremities. When sitting, support the legs so that they are level with the waist.      Discussed ankle sprains and tendonitis and importance of immobilization for healing as well as the lengthy course of treatment for complete resolution.    1. CAM boot for immobilization  2. xrays ordered to evaluate anatomy  3. Patient instructed to rest affected limb, avoid excessive WB and use crutch or walker assistance if needed.  4. Instructions on elevation to reduce pain and swelling. When sleeping, place a pillow under the injured leg. When sitting, support the injured leg so it is level with your waist.   5. Patient instructed on adequate icing techniques. Patient should ice the affected area at least once per day x 10 minutes for 10 days . I advised the  patient that extra icing would also be beneficial to ensure adequate anti inflammatory effect   6. Patient already on NSAIDs    She will continue to monitor the areas daily, inspect his feet, wear protective shoe gear when ambulatory, moisturizer to maintain skin integrity and follow in this office in approximately 2 months, sooner p.r.n.

## 2018-06-13 NOTE — PROGRESS NOTES
Individual Follow-Up Form    6/13/2018    Quit Date:     Clinical Status of Patient: Outpatientnonw    Length of Service: 60 minutes    Continuing Medication: yes  Patches    Other Medications: none     Target Symptoms: Withdrawal and medication side effects. The following were  rated moderate (3) to severe (4) on TCRS:  · Moderate (3): crave  · Severe (4): none    Comments: Pt continues to smoke 20 cigs/day. Pt remains on tobacco cessation medication of  21 mg nicotine patch QD and today 2 mg nicotine lozenge PRN (1-2 per hour in place of cigarettes.)was added or crave.  No adverse effects noted at this time. Pt doing well with rate reduction and wait times prior to smoking.  Pt asked to pick a quit day. . Reviewed coping strategies/habitual behavior/relapse prevention with patient. Exhaled carbon monoxide level was 48 ppm per Smokerlyzer  ( non- smoker = 0-5 ppm .)  Will see pt back in office in 2 weeks      Diagnosis: F17.200    Next Visit: 2 weeks

## 2018-06-13 NOTE — LETTER
June 14, 2018      Donaldo Pena MD  [address] [facility]  [place] [address]           [facility]  [address] [facility]  [place] [address]  Phone: [phone]          Patient: Audrey Natarajan   MR Number: [id number]   YOB: 1970   Date of Visit: 6/13/2018       Dear Dr. Donaldo Pena:    Thank you for referring Audrey Natarajan to me for evaluation. Attached you will find relevant portions of my assessment and plan of care.    If you have questions, please do not hesitate to call me. I look forward to following Audrey Natarajan along with you.    Sincerely,    Maira De Los Santos DPM    Enclosure  CC:  No Recipients    If you would like to receive this communication electronically, please contact [email or web address] or [phone] to request more information on [employer] Link access.    For providers and/or their staff who would like to refer a patient to [facility], please contact us through our one-stop-shop provider referral line, [facility] [place], at [phone].    If you feel you have received this communication in error or would no longer like to receive these types of communications, please e-mail [email or web address]

## 2018-06-20 ENCOUNTER — CLINICAL SUPPORT (OUTPATIENT)
Dept: SMOKING CESSATION | Facility: CLINIC | Age: 48
End: 2018-06-20
Payer: COMMERCIAL

## 2018-06-20 ENCOUNTER — TELEPHONE (OUTPATIENT)
Dept: PAIN MEDICINE | Facility: CLINIC | Age: 48
End: 2018-06-20

## 2018-06-20 DIAGNOSIS — F17.200 NICOTINE DEPENDENCE: Primary | ICD-10-CM

## 2018-06-20 PROCEDURE — 99404 PREV MED CNSL INDIV APPRX 60: CPT | Mod: S$GLB,,,

## 2018-06-20 PROCEDURE — 99999 PR PBB SHADOW E&M-EST. PATIENT-LVL I: CPT | Mod: PBBFAC,,,

## 2018-06-20 NOTE — PROGRESS NOTES
Individual Follow-Up Form    6/20/2018    Quit Date:    Clinical Status of Patient: Outpatient    Length of Service: 60 minutes    Continuing Medication: yes  Patches    Other Medications: none     Target Symptoms: Withdrawal and medication side effects. The following were  rated moderate (3) to severe (4) on TCRS:  · Moderate (3): none  · Severe (4): none    Comments: Pt continues to smoke 12 cigs/day. Pt remains on tobacco cessation medication of  21 mg nicotine patch QD and 2 mg nicotine gum  PRN (1-2 per hour in place of cigarettes.) No adverse effects noted at this time.  Pt asked to pick a quit day . Reviewed coping strategies/habitual behavior/relapse prevention with patient. Exhaled carbon monoxide level was 52 ppm per Smokerlyzer  ( non- smoker = 0-5 ppm .)  Will see pt back in office in 2 weeks. Patient's benefits end in a few days , called the trust to see if she can be granted an extension because she missed her early appoints due to illness and being hospitalized, awaiting their decision.      Diagnosis: F17.200    Next Visit: 2 weeks

## 2018-06-20 NOTE — TELEPHONE ENCOUNTER
Reminded patient of Pain Management appointment scheduled for tomorrow at 10a with Dr. Rahman- verbal confirmation received.  Location information also provided.

## 2018-06-20 NOTE — Clinical Note
Pt continues to smoke 12 cigs/day. Pt remains on tobacco cessation medication of  21 mg nicotine patch QD and 2 mg nicotine gum  PRN (1-2 per hour in place of cigarettes.) No adverse effects noted at this time.  Pt asked to pick a quit day . Reviewed coping strategies/habitual behavior/relapse prevention with patient. Exhaled carbon monoxide level was 52 ppm per Smokerlyzer  ( non- smoker = 0-5 ppm .)  Will see pt back in office in 2 weeks. Patient's benefits end in a few days , called the trust to see if she can be granted an extension because she missed her early appoints due to illness and being hospitalized, awaiting their decision.

## 2018-06-21 ENCOUNTER — OFFICE VISIT (OUTPATIENT)
Dept: PAIN MEDICINE | Facility: CLINIC | Age: 48
End: 2018-06-21
Payer: MEDICAID

## 2018-06-21 VITALS
OXYGEN SATURATION: 96 % | WEIGHT: 227 LBS | DIASTOLIC BLOOD PRESSURE: 76 MMHG | HEIGHT: 66 IN | BODY MASS INDEX: 36.48 KG/M2 | RESPIRATION RATE: 18 BRPM | SYSTOLIC BLOOD PRESSURE: 133 MMHG | HEART RATE: 90 BPM

## 2018-06-21 DIAGNOSIS — M54.16 LUMBAR RADICULOPATHY: ICD-10-CM

## 2018-06-21 DIAGNOSIS — M51.36 DDD (DEGENERATIVE DISC DISEASE), LUMBAR: Primary | ICD-10-CM

## 2018-06-21 DIAGNOSIS — M47.816 LUMBAR SPONDYLOSIS: ICD-10-CM

## 2018-06-21 PROCEDURE — 99204 OFFICE O/P NEW MOD 45 MIN: CPT | Mod: S$PBB,,, | Performed by: PAIN MEDICINE

## 2018-06-21 PROCEDURE — 99999 PR PBB SHADOW E&M-EST. PATIENT-LVL III: CPT | Mod: PBBFAC,,, | Performed by: PAIN MEDICINE

## 2018-06-21 PROCEDURE — 99213 OFFICE O/P EST LOW 20 MIN: CPT | Mod: PBBFAC,PN | Performed by: PAIN MEDICINE

## 2018-06-21 NOTE — LETTER
June 22, 2018      Donaldo Pena MD  [address] [facility]  [place] [address]           [facility]  [address] [facility]  [place] [address]  Phone: [phone]  Fax: [phone]          Patient: Audrey Natarajan   MR Number: [id number]   YOB: 1970   Date of Visit: 6/21/2018       Dear Dr. Donaldo Pena:    Thank you for referring Audrey Natarajan to me for evaluation. Attached you will find relevant portions of my assessment and plan of care.    If you have questions, please do not hesitate to call me. I look forward to following Audrey Natarajan along with you.    Sincerely,    Corey Rahman Jr., MD    Enclosure  CC:  No Recipients    If you would like to receive this communication electronically, please contact [email or web address] or [phone] to request more information on [employer] Link access.    For providers and/or their staff who would like to refer a patient to [facility], please contact us through our one-stop-shop provider referral line, [facility], at [phone].    If you feel you have received this communication in error or would no longer like to receive these types of communications, please e-mail [email or web address]

## 2018-06-21 NOTE — PROGRESS NOTES
Subjective:     Patient ID: Audrey Natarajan is a 47 y.o. female    Chief Complaint: Low-back Pain (patient experiences chronic left-sided low back pain w/o sciatica- patient states pain radiates down L leg - patient has fell 4x w/ prostetic boot- treatment w/ medication, PT ( pending-healthy back))      Referred by: Donaldo Pena MD      HPI:    Initial Encounter (6/21/18):  Audrey Natarajan is a 47 y.o. female who presents today with chronic low back and left lower extremity pain. This has been present for years. The pain is located across the low back and radiates into the left lower extremity. She reports numbness and weakness is the left lower extremity as well. She denies b/b dysfunction. The pain is constant and worsened with activity. It impedes her ability to perform household chores. She has started PT and notices some relief.    This pain is described in detail below.    Physical Therapy: Yes    Non-pharmacologic Treatment: Rest helps         · TENS? No    Pain Medications:         · Currently taking: Mobic, Gabapentin, Lidoderm patches    · Has tried in the past:  Opioids    · Has not tried: TCAs, SNRIs,     Blood thinners: ASA 81mg daily    Interventional Therapies: None    Relevant Surgeries: None    Affecting sleep? Yes    Affecting daily activities? yes    Depressive symptoms? yes          · SI/HI? No    Work status: Unemployed    Pain Scores:    Best:      5 /10  Worst:    10/10  Usually:  7 /10  Today:    9/10    Review of Systems   Constitutional: Negative for activity change, appetite change, chills, fatigue, fever and unexpected weight change.   HENT: Negative for hearing loss.    Eyes: Negative for visual disturbance.   Respiratory: Negative for chest tightness and shortness of breath.    Cardiovascular: Negative for chest pain.   Gastrointestinal: Negative for abdominal pain, constipation, diarrhea, nausea and vomiting.   Genitourinary: Negative for difficulty urinating.  "  Musculoskeletal: Positive for arthralgias, back pain, gait problem, myalgias and neck pain.   Skin: Negative for rash.   Neurological: Positive for weakness and numbness. Negative for dizziness, light-headedness and headaches.   Psychiatric/Behavioral: Positive for sleep disturbance. Negative for hallucinations and suicidal ideas. The patient is not nervous/anxious.        Past Medical History:   Diagnosis Date    ADHD (attention deficit hyperactivity disorder)     Arthritis     Asthma     Bipolar 1 disorder     COPD (chronic obstructive pulmonary disease)     Coronary artery disease     A fib    Depression     bipolar manic depresson    Diabetes mellitus     DVT of lower extremity, bilateral July 2013    bilateral LE DVT. Isaban filter placed.     Encounter for blood transfusion     History of blood clots 1. Left Leg=2003; 2.Bilateral Groin=Blood Clots= 5 or 6/ 2013 & 7/2013; 3. LLL of Lung=7/2013;  4. Lt. Lower Leg=7/2013.     Pt. had 1st Blood Clot after [place]=6268, & Last=2013. Isaban Filter= Rt.Lateral Neck.    HTN (hypertension) 6/6/2013    Pt states that she does not have hypertension    Hypercholesteremia     Irregular heartbeat     Neuromuscular disorder     neuropathy feet    PE (pulmonary embolism) July 2013     bilat LE DVT.     Restless leg syndrome        Past Surgical History:   Procedure Laterality Date    ABDOMINAL SURGERY      BILATERAL OOPHORECTOMY Bilateral 1/12/2015    Green' s filter Right 7/4/2012    Right Neck & Tunneled Down.    HERNIA REPAIR      "[place] [facility] around th [place].", pt. states    OVARIAN CYST REMOVAL  3/13/2014    ME REMOVAL OF OVARY/TUBE(S)      SPLENECTOMY, TOTAL  July 2003    TONSILLECTOMY      as a child    TYMPANOSTOMY TUBE PLACEMENT  1976    VEIN SURGERY  2003    Lt leg       Social History     Social History    Marital status:      Spouse name: N/A    Number of children: N/A    Years of education: " N/A     Occupational History    Not on file.     Social History Main Topics    Smoking status: Current Every Day Smoker     Packs/day: 0.50     Years: 25.00     Types: Cigarettes    Smokeless tobacco: Never Used    Alcohol use No    Drug use: No    Sexual activity: Not Currently     Other Topics Concern    Not on file     Social History Narrative    No narrative on file       Review of patient's allergies indicates:   Allergen Reactions    Morphine Other (See Comments)     Patient had a psychotic episode after taking Morphine  Agitation, hallucinations    Penicillins Anaphylaxis     itching       Current Outpatient Prescriptions on File Prior to Visit   Medication Sig Dispense Refill    albuterol (VENTOLIN HFA) 90 mcg/actuation inhaler INHALE 2 PUFFS BY MOUTH INTO THE LUNGS EVERY 6 HOURS AS NEEDED FOR WHEEZING. RESCUE. 18 g 2    aspirin (ECOTRIN) 81 MG EC tablet Take 81 mg by mouth.      b complex vitamins tablet Take 1 tablet by mouth 2 (two) times daily.       blood sugar diagnostic Strp To check BG once daily, to use with insurance preferred meter 30 each 5    blood-glucose meter kit To check BG once daily, to use with insurance preferred meter 1 each 0    carbamazepine (TEGRETOL) 200 mg tablet TK 2 TS PO BID  1    clonazePAM (KLONOPIN) 1 MG tablet TK 1 T PO BID PRA AND INSOMNIA  1    cyanocobalamin (VITAMIN B-12) 1000 MCG tablet Take 100 mcg by mouth once daily.      fish oil-omega-3 fatty acids 300-1,000 mg capsule Take 2 g by mouth once daily. Instructed to stop 5-7 days before surgery (8/11/16).      fluticasone (FLONASE) 50 mcg/actuation nasal spray 1 spray (50 mcg total) by Each Nare route once daily. 1 Bottle 2    furosemide (LASIX) 20 MG tablet TAKE 1 TABLET BY MOUTH EVERY DAY 30 tablet 2    gabapentin (NEURONTIN) 600 MG tablet TAKE 2 TABLETS(1200 MG) BY MOUTH THREE TIMES DAILY 180 tablet 1    hydrOXYzine HCl (ATARAX) 25 MG tablet TK 1 T PO QD  2    lancets Misc To check BG once  "daily, to use with insurance preferred meter 30 each 2    lidocaine (LIDODERM) 5 % Place 1 patch onto the skin once daily. Remove & Discard patch within 12 hours or as directed by MD. May use 4% formulation if more affordable for patient. 6 patch 0    lisinopril (PRINIVIL,ZESTRIL) 5 MG tablet TAKE 1 TABLET(5 MG) BY MOUTH EVERY DAY 30 tablet 2    meloxicam (MOBIC) 15 MG tablet Take 1 tablet (15 mg total) by mouth once daily. 90 tablet 1    meloxicam (MOBIC) 7.5 MG tablet Take 1 tablet (7.5 mg total) by mouth once daily. 12 tablet 0    metFORMIN (GLUCOPHAGE) 1000 MG tablet TAKE 1 TABLET(1000 MG) BY MOUTH TWICE DAILY WITH MEALS 60 tablet 2    metoprolol tartrate (LOPRESSOR) 25 MG tablet Take 1 tablet (25 mg total) by mouth once daily. 30 tablet 5    mometasone-formoterol (DULERA) 100-5 mcg/actuation HFAA Inhale 2 puffs into the lungs 2 (two) times daily. 1 Inhaler 0    multivitamin (THERAGRAN) per tablet Take 1 tablet by mouth every morning.      mupirocin (BACTROBAN) 2 % ointment EDIE EXT AA BID  3    nicotine polacrilex 2 MG Lozg 1-2 per hour in place of cigarettes maximum of 20 per day. 168 lozenge 0    omeprazole 20 mg TbEC TAKE 2 TABLETS BY MOUTH ONCE DAILY AT 6AM (Patient taking differently: one tablet twice daily) 90 each 1    pravastatin (PRAVACHOL) 40 MG tablet Take 1 tablet (40 mg total) by mouth once daily. 90 tablet 3    risperidone (RISPERDAL) 3 MG Tab take at bedtime  1    risperiDONE (RISPERDAL) 4 MG tablet       VYVANSE 30 mg capsule TK ONE C PO QAM  0    nicotine (NICODERM CQ) 21 mg/24 hr Place 1 patch onto the skin once daily. 14 patch 0     No current facility-administered medications on file prior to visit.        Objective:      /76   Pulse 90   Resp 18   Ht 5' 6" (1.676 m)   Wt 103 kg (227 lb)   LMP 11/01/2011 (LMP Unknown) Comment: stated when she was 39  SpO2 96%   BMI 36.64 kg/m²     Exam:  GEN:  Well developed, well nourished.  No acute distress. Normal pain " behavior.  HEENT:  No trauma.  Mucous membranes moist.  Nares patent bilaterally.  PSYCH: Normal affect. Thought content appropriate.  CHEST:  Breathing symmetric.  No audible wheezing.  ABD: Soft, non-distended.  SKIN:  Warm, pink, dry.  No rash on exposed areas.    EXT:  No cyanosis, clubbing, or edema.  No color change or changes in nail or hair growth.  NEURO/MUSCULOSKELETAL:  Fully alert, oriented, and appropriate. Speech normal guillermo. No cranial nerve deficits.   Gait: Antalgic.  No trendelenburg sign bilaterally.   Motor Strength: 5/5 motor strength throughout lower extremities.   Sensory: Non dermatomal numbness in left lower extremity.   Reflexes:  2+ and symmetric throughout.  Downgoing Babinski's bilaterally.  No clonus or spasticity.  L-Spine:  Limited ROM with pain on flexion = extension. Positive facet loading bilaterally.  Positive SLR on the left.    SI Joint/Hip: Unable to perform ALIX bilaterally.  Unable to perform FADIR bilaterally.  Diffusely TTP over lumbar paraspinals, bilateral SI joints, hips, piriformis muscles.          Imaging:    Outside Lumbar MRI:    Large L4-5 disc herniation L>R    See media for full report    Assessment:       Encounter Diagnoses   Name Primary?    DDD (degenerative disc disease), lumbar Yes    Lumbar radiculopathy     Lumbar spondylosis          Plan:       Audrey was seen today for low-back pain.    Diagnoses and all orders for this visit:    DDD (degenerative disc disease), lumbar  -     Ambulatory Referral to Neurosurgery    Lumbar radiculopathy  -     Ambulatory Referral to Neurosurgery    Lumbar spondylosis  -     Ambulatory Referral to Neurosurgery        Audrey Aiyana Natarajan is a 47 y.o. female with chronic bilateral low back pain the radiates to the left lower extremity. Back pain is likely multifactorial, but appears mainly facetogenic. Also with symptoms consistent with left L5 radiculopathy. MRI supports this diagnosis.     1. Pertinent imaging  studies reviewed by me. Imaging results were discussed with patient.  2. Patient already enrolled in PT/Healthy Back program. I advised her to continue this and establish a home exercise program.  3. Continue Mobic and Gabapentin. I do not recommend the use of opioid medications in this case.  4. We discussed that she may benefit from lumbar TFESI vs. MBB/RFA, but her insurance does not cover interventional pain procedures.   5. Refer to neurosurgery for further evaluation/treatment.  6. RTC PRN

## 2018-06-22 ENCOUNTER — CLINICAL SUPPORT (OUTPATIENT)
Dept: REHABILITATION | Facility: HOSPITAL | Age: 48
End: 2018-06-22
Attending: INTERNAL MEDICINE
Payer: MEDICAID

## 2018-06-22 DIAGNOSIS — G89.29 CHRONIC BILATERAL LOW BACK PAIN WITHOUT SCIATICA: ICD-10-CM

## 2018-06-22 DIAGNOSIS — M54.50 CHRONIC BILATERAL LOW BACK PAIN WITHOUT SCIATICA: ICD-10-CM

## 2018-06-22 PROCEDURE — 97110 THERAPEUTIC EXERCISES: CPT | Mod: PN

## 2018-06-22 PROCEDURE — 97161 PT EVAL LOW COMPLEX 20 MIN: CPT | Mod: PN

## 2018-06-22 RX ORDER — IBUPROFEN 200 MG
1 TABLET ORAL DAILY
Qty: 14 PATCH | Refills: 0 | Status: SHIPPED | OUTPATIENT
Start: 2018-06-22 | End: 2018-07-11 | Stop reason: SDUPTHER
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
## 2018-06-26 ENCOUNTER — HOSPITAL ENCOUNTER (EMERGENCY)
Facility: HOSPITAL | Age: 48
Discharge: HOME | End: 2018-06-26
Attending: EMERGENCY MEDICINE
Payer: MEDICAID

## 2018-06-26 ENCOUNTER — NURSE TRIAGE (OUTPATIENT)
Dept: ADMINISTRATIVE | Facility: CLINIC | Age: 48
End: 2018-06-26

## 2018-06-26 VITALS
TEMPERATURE: 99 F | SYSTOLIC BLOOD PRESSURE: 116 MMHG | OXYGEN SATURATION: 98 % | DIASTOLIC BLOOD PRESSURE: 63 MMHG | HEART RATE: 87 BPM | WEIGHT: 222 LBS | BODY MASS INDEX: 35.68 KG/M2 | HEIGHT: 66 IN | RESPIRATION RATE: 16 BRPM

## 2018-06-26 DIAGNOSIS — G44.309 POST-CONCUSSION HEADACHE: ICD-10-CM

## 2018-06-26 DIAGNOSIS — S89.90XA KNEE INJURY, INITIAL ENCOUNTER: ICD-10-CM

## 2018-06-26 DIAGNOSIS — S69.92XA LEFT WRIST INJURY, INITIAL ENCOUNTER: ICD-10-CM

## 2018-06-26 DIAGNOSIS — S99.922A FOOT INJURY, LEFT, INITIAL ENCOUNTER: ICD-10-CM

## 2018-06-26 DIAGNOSIS — S16.1XXA STRAIN OF NECK MUSCLE, INITIAL ENCOUNTER: ICD-10-CM

## 2018-06-26 DIAGNOSIS — S99.912A INJURY OF LEFT ANKLE, INITIAL ENCOUNTER: ICD-10-CM
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
## 2018-06-26 DIAGNOSIS — S09.90XA INJURY OF HEAD, INITIAL ENCOUNTER: Primary | ICD-10-CM

## 2018-06-26 PROCEDURE — 29130 APPL FINGER SPLINT STATIC: CPT | Mod: FA

## 2018-06-26 PROCEDURE — 99284 EMERGENCY DEPT VISIT MOD MDM: CPT | Mod: 25

## 2018-06-26 PROCEDURE — 25000003 PHARM REV CODE 250: Performed by: NURSE PRACTITIONER

## 2018-06-26 RX ORDER — METHOCARBAMOL 500 MG/1
500 TABLET, FILM COATED ORAL 4 TIMES DAILY
COMMUNITY
End: 2018-08-21 | Stop reason: SDUPTHER

## 2018-06-26 RX ORDER — HYDROCODONE BITARTRATE AND ACETAMINOPHEN 5; 325 MG/1; MG/1
1 TABLET ORAL EVERY 6 HOURS PRN
Qty: 12 TABLET | Refills: 0 | Status: SHIPPED | OUTPATIENT
Start: 2018-06-26 | End: 2018-08-01

## 2018-06-26 RX ORDER — ACETAMINOPHEN 325 MG/1
650 TABLET ORAL
Status: COMPLETED | OUTPATIENT
Start: 2018-06-26 | End: 2018-06-26

## 2018-06-26 RX ADMIN — ACETAMINOPHEN 650 MG: 325 TABLET, FILM COATED ORAL at 11:06

## 2018-06-26 NOTE — DISCHARGE INSTRUCTIONS
Please return to the Emergency Department for any new or worsening symptoms including: worsening pain, confusion, worsening headache, vision changes, fever, chest pain, shortness of breath, loss of consciousness, dizziness, weakness, or any other concerns.     Please follow up with your Primary Care Provider within in the week for re-evaluation of your symptoms. If you do not have one, you may contact the one listed on your discharge paperwork or you may also call the [facility] Appointment Desk at [phone] to schedule an appointment with one.     [facility] - Orthopedics Clinic: (FOR YOUR WRIST INJURY) to make sure there is no fracture.   If you would like to follow up with the [facility] for further care of your injury, please call the [facility] Scheduling Department at [phone] during business hours. Please let the  know you need a follow-up appointment for your injury with Orthopedics, and you will be scheduled in the Orthopedic Clinic. Please bring your Discharge Papers with you to the clinic appointment.    You have been prescribed Norco for pain. Please do not take this medication while working, drinking alcohol, swimming, or while driving/operating heavy machinery. This medication may cause drowsiness, impair judgment, and reduce physical capabilities. This medication contains Tylenol. Please do not take any additional Tylenol while you are taking this medication.     Please keep your splint on and dry until you are seen by Orthopedics and they tell you that you are OK to remove it. Rest and Elevate the extremity to help reduce swelling and pain.

## 2018-06-26 NOTE — ED TRIAGE NOTES
"Patient reports falling at 0130 after slipping on something in the floor reports hitting head and being confused for about 20-30 minutes after falling. Patient reports hitting back of head on the left side, reports headache left shoulder pain and pain to left side of neck. Left hand and forearm pain and left knee and left foot pain. Denies nausea "just a really bad headache that won't go away".  "

## 2018-06-26 NOTE — TELEPHONE ENCOUNTER
"    Reason for Disposition   [1] Large swelling AND [2] size > palm of person's hand   [1] ACUTE NEURO SYMPTOM AND [2] now fine  (DEFINITION: difficult to awaken OR confused thinking and talking OR slurred speech OR weakness of arms OR unsteady walking)    Additional Information   Negative: [1] Loss of vision or double vision AND [2] present now     No vision changes, but pt states her eye "socket" is sore   Negative: [1] SEVERE headache AND [2]  not improved 2 hours after pain medicine/ice packs     Pt has not taken anything for pain, yet, this am, states she just woke up.   Negative: Taking Coumadin (warfarin) or other strong blood thinner, or known bleeding disorder (e.g., thrombocytopenia)     Pt is on aspirin daily    Answer Assessment - Initial Assessment Questions  1. MECHANISM: "How did the injury happen?" For falls, ask: "What height did you fall from?" and "What surface did you fall against?"       Slipped and fell when walking to the kitchen, hit head on the floor  2. ONSET: "When did the injury happen?" (Minutes or hours ago)       0130 this am  3. NEUROLOGIC SYMPTOMS: "Was there any loss of consciousness?" "Are there any other neurological symptoms?"       Pt states she was dazed for about 20 minutes  4. MENTAL STATUS: "Does the person know who he is, who you are, and where he is?"       Pt now alert and oriented  5. LOCATION: "What part of the head was hit?"       Left side of head  6. SCALP APPEARANCE: "What does the scalp look like? Is it bleeding now?" If so, ask: "Is it difficult to stop?"       No laceration, swollen: raised about 1 inch, and about 2 inches in length  7. SIZE: For cuts, bruises, or swelling, ask: "How large is it?" (e.g., inches or centimeters)       See above  8. PAIN: "Is there any pain?" If so, ask: "How bad is it?"  (e.g., Scale 1-10; or mild, moderate, severe)      7/10  9. TETANUS: For any breaks in the skin, ask: "When was the last tetanus booster?"      na  10. OTHER " "SYMPTOMS: "Do you have any other symptoms?" (e.g., neck pain, vomiting)        Yes, neck pain.  Pt also c/o pain pain in left wrist and left foot  11. PREGNANCY: "Is there any chance you are pregnant?" "When was your last menstrual period?"        No, oophorectomy x 4 years ago.    Pt is putting ice on the painful areas.    Protocols used: ST HEAD INJURY-A-      "

## 2018-06-26 NOTE — ED PROVIDER NOTES
"Encounter Date: 6/26/2018    SCRIBE #1 NOTE: I, Jose De Jesus Recinos, am scribing for, and in the presence of,  Karlos Lee NP. I have scribed the following portions of the note - Other sections scribed: HPI, ROS.       History     Chief Complaint   Patient presents with    Fall     " i fell" reports fall this morning in the kitchen floor, reports slipping on floor and striking head head, neg LOC, c/o L sided head, " eye socket" " wrist and shoulder and my left foot"      CC: Fall    48 y/o female with arthritis, asthma, COPD, CAD, DM, DVT of bilateral lower extremity, encounter for blood transfusion, HTN, Hypercholesteremia, Irregular heartbeat, Neuromuscular disorder, PE and restless leg syndrome presents to the ED c/o acute onset L occipital head trauma, L infraorbital pain, HA, L neck pain, L shoulder pain, L wrist pain, L elbow pain, L knee pain to medial aspect, L ankle pain, and L foot pain due to an accidental mechanical slip and fall at 1:30AM today. The pain is severe (7/10) and worse with palpation. The patient reports accidentally slipping on water causing her to fall backwards and hit her L occiput on the ground. The patient attempted to brace her fall with her L hand. The patient reports feeling dazed and confused after the fall. She states the impact of her occiput to the ground woke her family from sleep. The patient reports chronic L foot pain due to tearing her achilles tendon in the past. She states that she normally has to wear a boot; however, she couldn't wear the boot today due to the pain. The patient attempted x2 Tylenol Extra Strength at 7:30AM today and placed ice to her occiput with no relief. The patient denies taking blood thinners. The patient denies LOC, hip pain, fever, or cough. No other symptoms reported.      The history is provided by the patient. No  was used.     Review of patient's allergies indicates:   Allergen Reactions    Morphine Other (See Comments) " "    Patient had a psychotic episode after taking Morphine  Agitation, hallucinations    Penicillins Anaphylaxis     itching     Past Medical History:   Diagnosis Date    ADHD (attention deficit hyperactivity disorder)     Arthritis     Asthma     Bipolar 1 disorder     COPD (chronic obstructive pulmonary disease)     Coronary artery disease     A fib    Depression     bipolar manic depresson    Diabetes mellitus     DVT of lower extremity, bilateral 2013    bilateral LE DVT. Rochelle filter placed.     Encounter for blood transfusion     History of blood clots 1. Left Leg=; 2.Bilateral Groin=Blood Clots=  or 2013 & 2013; 3. LLL of Lung=2013;  4. Lt. Lower Leg=2013.     Pt. had 1st Blood Clot after [id number], & Last=. Estelita Filter= Rt.Lateral Neck.    HTN (hypertension) 2013    Pt states that she does not have hypertension    Hypercholesteremia     Irregular heartbeat     Neuromuscular disorder     neuropathy feet    PE (pulmonary embolism) 2013     bilat LE DVT.     Restless leg syndrome      Past Surgical History:   Procedure Laterality Date    ABDOMINAL SURGERY      BILATERAL OOPHORECTOMY Bilateral 2015    Green' s filter Right 2012    Right Neck & Tunneled Down.    HERNIA REPAIR      "[place] [facility] around th [place].", pt. states    OVARIAN CYST REMOVAL  3/13/2014    IA REMOVAL OF OVARY/TUBE(S)      SPLENECTOMY, TOTAL  2003    TONSILLECTOMY      as a child    TYMPANOSTOMY TUBE PLACEMENT  1976    VEIN SURGERY      Lt leg     Family History   Problem Relation Age of Onset    Hypertension Father     Diabetes Father     Heart disease Father     Cataracts Father     Diabetes Paternal Grandfather     Heart disease Paternal Grandfather     No Known Problems Mother     Ovarian cancer Maternal Grandmother          from this. ? age     No Known Problems Sister     No Known Problems Brother     No " Known Problems Maternal Aunt     No Known Problems Maternal Uncle     No Known Problems Paternal Aunt     No Known Problems Paternal Uncle     No Known Problems Maternal Grandfather     Ovarian cancer Paternal Grandmother     Uterine cancer Neg Hx     Breast cancer Neg Hx     Colon cancer Neg Hx     Amblyopia Neg Hx     Blindness Neg Hx     Cancer Neg Hx     Glaucoma Neg Hx     Macular degeneration Neg Hx     Retinal detachment Neg Hx     Strabismus Neg Hx     Stroke Neg Hx     Thyroid disease Neg Hx      Social History   Substance Use Topics    Smoking status: Current Every Day Smoker     Packs/day: 0.50     Years: 25.00     Types: Cigarettes    Smokeless tobacco: Never Used    Alcohol use No     Review of Systems   Constitutional: Negative for chills and fever.   HENT: Negative for congestion, ear pain, rhinorrhea and sore throat.    Eyes: Negative for redness.        (+) L infraorbital pain   Respiratory: Negative for cough and shortness of breath.    Cardiovascular: Negative for chest pain.   Gastrointestinal: Negative for abdominal pain, diarrhea, nausea and vomiting.   Genitourinary: Negative for decreased urine volume, difficulty urinating, dysuria, frequency, hematuria and urgency.   Musculoskeletal: Positive for neck pain (L sided). Negative for back pain.        (+) L shoulder pain  (+) L knee pain to medial aspect  (+) L wrist pain  (+) L foot pain  (+) L ankle pain  (+) L elbow pain  (-) hip pain   Skin: Negative for rash.   Neurological: Positive for headaches.        (+) L occipital head trauma  (-) LOC       Physical Exam     Initial Vitals [06/26/18 1105]   BP Pulse Resp Temp SpO2   (!) 173/74 101 20 98.6 °F (37 °C) 98 %      MAP       --         Physical Exam    Nursing note and vitals reviewed.  Constitutional: She appears well-developed and well-nourished. She is not diaphoretic. She is cooperative.  Non-toxic appearance. She does not have a sickly appearance. No distress.    HENT:   Head: Normocephalic. Head is without raccoon's eyes, without Gilbert's sign, without abrasion and without laceration.       Right Ear: Tympanic membrane and external ear normal. Tympanic membrane is not erythematous. No hemotympanum.   Left Ear: Tympanic membrane and external ear normal. Tympanic membrane is not erythematous. No hemotympanum.   Mouth/Throat: Uvula is midline, oropharynx is clear and moist and mucous membranes are normal. No trismus in the jaw. No tonsillar abscesses.   Tender to palpation the left inferior lateral orbit without bony step-off or crepitus.  EOMs are intact without pain.  No subconjunctival hemorrhage or external evidence of facial trauma.   Eyes: Conjunctivae, EOM and lids are normal. Pupils are equal, round, and reactive to light.   Neck: Normal range of motion and phonation normal. Spinous process tenderness and muscular tenderness (left sided) present. No tracheal deviation and normal range of motion present. No neck rigidity.       Small contusion to the left occipital area.  Cm edge.   Cardiovascular: Normal rate, regular rhythm and normal heart sounds. Exam reveals no gallop and no friction rub.    No murmur heard.  Pulses:       Radial pulses are 2+ on the right side, and 2+ on the left side.        Dorsalis pedis pulses are 2+ on the right side, and 2+ on the left side.   Pulmonary/Chest: Effort normal and breath sounds normal. No stridor. No tachypnea and no bradypnea. No respiratory distress. She has no wheezes. She has no rhonchi. She has no rales. She exhibits no tenderness.   Abdominal: Soft. There is no tenderness. There is no guarding.   Musculoskeletal:        Left shoulder: She exhibits tenderness, bony tenderness and pain. She exhibits normal range of motion, no deformity and normal strength.        Left elbow: She exhibits normal range of motion, no swelling, no effusion and no deformity. Tenderness found.        Left wrist: She exhibits tenderness and bony  tenderness. She exhibits normal range of motion, no swelling and no crepitus.        Left hip: Normal.        Left knee: She exhibits bony tenderness. She exhibits normal range of motion, no swelling and no deformity. Tenderness found. Medial joint line tenderness noted. No lateral joint line tenderness noted.        Left ankle: She exhibits no ecchymosis and no deformity. Tenderness. Lateral malleolus and medial malleolus tenderness found.        Left lower leg: Normal.        Left foot: There is tenderness and bony tenderness. There is no crepitus.   Tender to palpation of the midline cervical neck, the left lateral paraspinous neck, the left trapezius, left bony shoulder, left elbow, the left radial and ulnar wrist.  She does have snuffbox tenderness and pain with range of motion of the left wrist.  There is pain with axial loading of the left thumb.  Tender to palpation of the left medial joint line without joint laxity.  Medial and lateral malleolus tenderness of the left ankle without deformity.  Mild tenderness palpation of the dorsum left foot.   Neurological: She is alert and oriented to person, place, and time. She has normal strength.   Skin: Skin is warm, dry and intact. No rash noted. No cyanosis or erythema. Nails show no clubbing.   Psychiatric: She has a normal mood and affect. Her behavior is normal. Thought content normal.         ED Course   Splint Application  Date/Time: 6/26/2018 4:50 PM  Performed by: VIKAS LÓPEZ  Authorized by: SUNIL CARLSON   Location details: left wrist  Splint type: thumb spica  Supplies used: Ortho-Glass  Post-procedure: The splinted body part was neurovascularly unchanged following the procedure.  Patient tolerance: Patient tolerated the procedure well with no immediate complications        Labs Reviewed - No data to display       Imaging Results          X-Ray Shoulder Trauma Left (Final result)  Result time 06/26/18 12:50:22    Final result by Micha DOS SANTOS  MD Steven (06/26/18 12:50:22)                 Impression:      No acute fracture or dislocation.      Electronically signed by: Micha Harris MD  Date:    06/26/2018  Time:    12:50             Narrative:    EXAMINATION:  XR SHOULDER TRAUMA 3 VIEW LEFT    CLINICAL HISTORY:  Unspecified fall, initial encounter    TECHNIQUE:  Three views of the left shoulder were performed.    COMPARISON  None    FINDINGS:  No acute fracture or dislocation.  No radiopaque foreign bodies or focal soft tissue masses.  Visualized lung is clear.                               X-Ray Wrist Complete Left (Final result)  Result time 06/26/18 12:49:22    Final result by Gabe Chou MD (06/26/18 12:49:22)                 Impression:      As above      Electronically signed by: Gabe Chou MD  Date:    06/26/2018  Time:    12:49             Narrative:    EXAMINATION:  XR WRIST COMPLETE 3 VIEWS LEFT    CLINICAL HISTORY:  Unspecified fall, initial encounter    TECHNIQUE:  PA, lateral, and oblique views of the left wrist were performed.    COMPARISON:  None    FINDINGS:  Three views.    There may be mild edema about the dorsal aspect of the hand and wrist.  There is undulation involving the thenar aspect of the distal radius, no definite fracture plane identified, correlation with any focal tenderness in the region.  No radiopaque foreign body.                               X-Ray Knee 3 View Left (Final result)  Result time 06/26/18 12:51:45    Final result by Micha Harris MD (06/26/18 12:51:45)                 Impression:      No acute findings.      Electronically signed by: Micha Harris MD  Date:    06/26/2018  Time:    12:51             Narrative:    EXAMINATION:  XR KNEE 3 VIEW LEFT    CLINICAL HISTORY:  Unspecified fall, initial encounter    TECHNIQUE:  AP, lateral, and Merchant views of the left knee were performed.    COMPARISON:  None    FINDINGS:  No acute fracture.  No significant knee joint effusion.  Mild  diffuse edema.  No radiopaque foreign bodies.  No focal soft tissue mass.  No destructive osseous lesion.                               X-Ray Ankle Complete Left (Final result)  Result time 06/26/18 12:49:57    Final result by Gabe Chou MD (06/26/18 12:49:57)                 Impression:      1. No acute displaced fracture or dislocation of the ankle noting edema.      Electronically signed by: Gabe Chou MD  Date:    06/26/2018  Time:    12:49             Narrative:    EXAMINATION:  XR ANKLE COMPLETE 3 VIEW LEFT    CLINICAL HISTORY:  Unspecified fall, initial encounter    TECHNIQUE:  AP, lateral and oblique views of the left ankle were performed.    COMPARISON:  None    FINDINGS:  Three views.    There is edema about the ankle.  The ankle mortise is intact.  No acute displaced fracture or dislocation of the ankle.  Degenerative changes are noted of the calcaneus.  Please see separately dictated report for details regarding the foot.                               X-Ray Foot Complete Left (Final result)  Result time 06/26/18 12:51:46    Final result by Gabe Chou MD (06/26/18 12:51:46)                 Impression:      1. Fracture involving the proximal aspect of the proximal phalange of the 3rd digit, noting mild edema.  Please note, portions appear corticated, and could reflect subacute injury, correlation is needed.      Electronically signed by: Gabe Chou MD  Date:    06/26/2018  Time:    12:51             Narrative:    EXAMINATION:  XR FOOT COMPLETE 3 VIEW LEFT    CLINICAL HISTORY:  .  Unspecified fall, initial encounter    TECHNIQUE:  AP, lateral and oblique views of the left foot were performed.    COMPARISON:  07/16/2016    FINDINGS:  Three views.    There is a primarily transversely oriented fracture involving the base of the proximal phalange of the 3rd toe.  Fracture plane approaches and may involve the articular surface medially, although not confirmed.  There is edema about the  location.  Please note, portions of the fracture appear corticated, subacute injury is a consideration.                               CT Head Without Contrast (Final result)  Result time 06/26/18 12:28:43    Final result by Gabe Chou MD (06/26/18 12:28:43)                 Impression:      1. No acute displaced fracture or dislocation of the maxillofacial region.  2. Induration about the right parotid gland, correlation for site of trauma recommended, parotid infection not excluded, and correlation is needed.  There are several nonenlarged cervical lymph nodes bilaterally.  3. No acute intracranial abnormalities.  4. Several additional findings above.      Electronically signed by: Gabe Chou MD  Date:    06/26/2018  Time:    12:28             Narrative:    EXAMINATION:  CT HEAD WITHOUT CONTRAST; CT MAXILLOFACIAL WITHOUT CONTRAST    CLINICAL HISTORY:  Fall, Head Injury, Headache;; Maxface trauma blunt;    TECHNIQUE:  Low dose axial images were obtained through the head.  Coronal and sagittal reformations were also performed. Contrast was not administered.  Axial images of the maxillofacial region were obtained at 2.5 mm intervals without administration of IV contrast.  Coronal and sagittal reformatted images were reviewed.    COMPARISON:  03/05/2014, 03/02/2014    FINDINGS:  The brain is normally formed and exhibits normal density throughout.  There is no evidence of acute major vascular territory infarct, hemorrhage, or mass.  There is no hydrocephalus.  There are no abnormal extra-axial fluid collections.  No acute displaced calvarial fracture.    There is mucous membrane thickening of the bilateral maxillary sinuses.  The bilateral orbital walls and zygomatic arches are intact.  The bilateral mandibular condyles are in appropriate location.  There are several missing teeth.  The visualized portions of the cervical spine are intact, noting degenerative changes.  The airway is patent.  No acute  displaced fracture or dislocation of the maxillofacial region.  There is some soft tissue induration about the lateral aspect of the right parotid gland, correlation for site of injury recommended.  No focal organized collection.  The bilateral globes and orbital content are unremarkable.  The visualized soft tissues of the neck are grossly unremarkable noting several nonenlarged bilateral cervical lymph nodes.                               CT Maxillofacial Without Contrast (Final result)  Result time 06/26/18 12:28:43    Final result by Gabe Chou MD (06/26/18 12:28:43)                 Impression:      1. No acute displaced fracture or dislocation of the maxillofacial region.  2. Induration about the right parotid gland, correlation for site of trauma recommended, parotid infection not excluded, and correlation is needed.  There are several nonenlarged cervical lymph nodes bilaterally.  3. No acute intracranial abnormalities.  4. Several additional findings above.      Electronically signed by: Gabe Chou MD  Date:    06/26/2018  Time:    12:28             Narrative:    EXAMINATION:  CT HEAD WITHOUT CONTRAST; CT MAXILLOFACIAL WITHOUT CONTRAST    CLINICAL HISTORY:  Fall, Head Injury, Headache;; Maxface trauma blunt;    TECHNIQUE:  Low dose axial images were obtained through the head.  Coronal and sagittal reformations were also performed. Contrast was not administered.  Axial images of the maxillofacial region were obtained at 2.5 mm intervals without administration of IV contrast.  Coronal and sagittal reformatted images were reviewed.    COMPARISON:  03/05/2014, 03/02/2014    FINDINGS:  The brain is normally formed and exhibits normal density throughout.  There is no evidence of acute major vascular territory infarct, hemorrhage, or mass.  There is no hydrocephalus.  There are no abnormal extra-axial fluid collections.  No acute displaced calvarial fracture.    There is mucous membrane thickening of  the bilateral maxillary sinuses.  The bilateral orbital walls and zygomatic arches are intact.  The bilateral mandibular condyles are in appropriate location.  There are several missing teeth.  The visualized portions of the cervical spine are intact, noting degenerative changes.  The airway is patent.  No acute displaced fracture or dislocation of the maxillofacial region.  There is some soft tissue induration about the lateral aspect of the right parotid gland, correlation for site of injury recommended.  No focal organized collection.  The bilateral globes and orbital content are unremarkable.  The visualized soft tissues of the neck are grossly unremarkable noting several nonenlarged bilateral cervical lymph nodes.                               CT Cervical Spine Without Contrast (Final result)  Result time 06/26/18 12:40:18    Final result by Gabe Chou MD (06/26/18 12:40:18)                 Impression:      1. No acute displaced fracture or dislocation of the cervical spine noting degenerative changes, similar in appearance to the previous examination.      Electronically signed by: Gabe Chou MD  Date:    06/26/2018  Time:    12:40             Narrative:    EXAMINATION:  CT CERVICAL SPINE WITHOUT CONTRAST    CLINICAL HISTORY:  Neck pain, first study;    TECHNIQUE:  Low dose axial images, sagittal and coronal reformations were performed though the cervical spine.  Contrast was not administered.    COMPARISON:  10/15/2013, 10/10/2013    FINDINGS:  Examination is limited by habitus and motion.    The visualized lung apices are grossly clear.  The paraspinous and hypo pharyngeal soft tissues are grossly unremarkable.  There are scattered nonenlarged cervical lymph nodes.    Sagittal reformatted imaging demonstrates adequate alignment of the cervical spine without significant vertebral body height loss.  There is disc space height loss primarily involving C6-C7 and C7-T1.  There is anterior and  posterior marginal osteophytosis, primarily involving C4, C5, and C6.  There is disc osteophyte complex involving C4-C5, C5-C6, and C6-C7.  This results in multilevel mild spinal canal stenosis without severe spinal canal stenosis or severe neuroforaminal narrowing at any level.  There is mild multilevel facet arthropathy.                                       APC / Resident Notes:   This is an evaluation of a 47 y.o. female that presents to the Emergency Department for multiple injuries that she sustained during a slip and fall this morning after she slipped on water in the kitchen she reports falling backwards, greater on the left side. Physical Exam shows a non-toxic, afebrile, and well appearing female.  There is tenderness palpation of the left posterior head with a area of soft tissue swelling just inferior to the left ear.  No crepitus bony deformity, or step-off noted over the head.  There is tender to palpation over the left inferior zygomatic arch without bony deformity, crepitus, or step-off.  EOMs are intact without pain.  Pupils are equal, round, reactive to light. Tender to palpation of the midline cervical spine without step-off or crepitus.  There is tender to palpation left paraspinous muscular neck.  There is palpation of the left shoulder, left elbow, and left wrist.  There is pain with axial loading of the left thumb and stump ox tenderness of the left wrist.  Mild tenderness of the left medial joint line without bony deformity or crepitus.  Chest palpation of the bilateral ankle and dorsal aspect of the foot.  Full range of motion of the left shoulder, left elbow, left knee, left ankle without difficulties. Vital Signs Are Reassuring. If available, previous records reviewed. RESULTS:  X-ray of the left shoulder:  Without evidence of fracture dislocation..  X-ray the left wrist mild edema around the dorsal aspect of the hand and wrist.  No definite fracture plan identified however correlation  with focal tenderness if needed.  X-ray left knee:  Without evidence of fracture dislocation.  X-ray left ankle:  Without evidence of fracture dislocation. X-ray left foot:  Fracture involving the proximal aspect of the 3rd digit with mild edema. Portions appear corticated and could reflect subacute injury. She does report a previous injury to this foot and is posterior wearing a walking boot.  She was unable to with a walking boot on because of pain in the foot today. CT Head:  No acute fracture.  Induration surrounding the right parotid gland with correlation for trauma versus prior infection.  Nonenlarged cervical nodes bilaterally. CT max face:  No evidence of acute fracture.  CT cervical spine with no evidence of fracture however degenerative changes noted. She is aware of the old fracture in the left 3rd toe - fractured it 1/2018.  Patient has no pain over the left parotid gland to suggest acute parotiditis.  There is no bruising or evidence of trauma over this area.    My overall impression is head injury with post concussion headache, left wrist injury with concern for fracture, multiple injuries and contusions of left shoulder, left elbow, left knee, left foot, left ankle.  Pre-existing fracture of left 3rd toe present prior to today's fall.  I considered, but at this time, do not suspect intracranial hemorrhage, skull fracture, shoulder/elbow/knee/ankle/acute foot fracture, dislocation.  Out of an abundance of caution and given the patient's pain with snuffbox tenderness in the left wrist, will put her in a thumb spica and have her follow up with Orthopedics for further evaluation definitive management of the left wrist injury.    D/C Information:  Head injury precautions, Fracture instructions, splint instructions, orthopedic follow-up instructions with [facility].  She reports unable to use her existingWalking boot given the pain since the fall today.  I will give her a hard sole shoe to give her comfort with  walking on the left foot until she can use the walking boot again.  The diagnosis, treatment plan, instructions for follow-up and reevaluation with PCP for other injuries, Orthopedics for wrist injury as well as ED return precautions were discussed and understanding was verbalized. All questions or concerns have been addressed. This case was discussed withDr. Salinas who is in agreement with my assessment and plan. GIOVANNY Wood, FNP-C           Scribe Attestation:   Scribe #1: I performed the above scribed service and the documentation accurately describes the services I performed. I attest to the accuracy of the note.    Attending Attestation:           Physician Attestation for Scribe:  Physician Attestation Statement for Scribe #1: I, Karlos Trivedi - GABI, reviewed documentation, as scribed by Jose De Jesus Recinos in my presence, and it is both accurate and complete.                    Clinical Impression:   The primary encounter diagnosis was Injury of head, initial encounter. Diagnoses of Fall, Post-concussion headache, Left wrist injury, initial encounter, Strain of neck muscle, initial encounter, Knee injury, initial encounter, Foot injury, left, initial encounter, and Injury of left ankle, initial encounter were also pertinent to this visit.      Disposition:   Disposition: Discharged  Condition: Stable                        AG Kearns  06/26/18 2990

## 2018-06-27 ENCOUNTER — CLINICAL SUPPORT (OUTPATIENT)
Dept: SMOKING CESSATION | Facility: CLINIC | Age: 48
End: 2018-06-27
Payer: COMMERCIAL

## 2018-06-27 ENCOUNTER — TELEPHONE (OUTPATIENT)
Dept: FAMILY MEDICINE | Facility: CLINIC | Age: 48
End: 2018-06-27

## 2018-06-27 DIAGNOSIS — F17.200 NICOTINE DEPENDENCE: Primary | ICD-10-CM

## 2018-06-27 DIAGNOSIS — S62.102D CLOSED FRACTURE OF LEFT WRIST WITH ROUTINE HEALING, SUBSEQUENT ENCOUNTER: Primary | ICD-10-CM

## 2018-06-27 PROCEDURE — 99403 PREV MED CNSL INDIV APPRX 45: CPT | Mod: S$GLB,,,

## 2018-06-27 PROCEDURE — 99999 PR PBB SHADOW E&M-EST. PATIENT-LVL I: CPT | Mod: PBBFAC,,,

## 2018-06-27 NOTE — PROGRESS NOTES
Individual Follow-Up Form    6/27/2018    Quit Date:     Clinical Status of Patient: Outpatient    Length of Service: 45 minutes    Continuing Medication: yes  Patches    Other Medications: nrt gum      Target Symptoms: Withdrawal and medication side effects. The following were  rated moderate (3) to severe (4) on TCRS:  · Moderate (3): crave , irritable  · Severe (4): none    Comments: Pt continues to smoke 6 cigs/day. Pt remains on tobacco cessation medication of  21 mg nicotine patch QD and 2 mg nicotine gum  PRN (1-2 per hour in place of cigarettes.) No adverse effects noted at this time. Pt ddid quit for 2 days but she injured her arm and states that her  bought her cigarettes because he said she was grouchy.  . Reviewed coping strategies/habitual behavior/relapse prevention with patient. Exhaled carbon monoxide level was 60  ppm per Smokerlyzer  ( non- smoker = 0-5 ppm .)  Will see pt back in office in 2 weeks       Diagnosis: F17.200    Next Visit: 2 weeks

## 2018-06-27 NOTE — TELEPHONE ENCOUNTER
referal to [facility] ortho entered. Request assistance from our referral office to schedule

## 2018-06-27 NOTE — TELEPHONE ENCOUNTER
Patient said that she went to the emergency room on yesterday and was told that she has a fractured left wrist and thumb and would need to get a referral from her primary doctor to see [facility]. Patient would like referral to go to Ortho at [place]. Patient said that they put on a temporary case that has to come off this Friday. Please advise.

## 2018-06-27 NOTE — TELEPHONE ENCOUNTER
----- Message from Mayra Bravo sent at 6/26/2018  4:21 PM CDT -----  Contact: pt            Name of Who is Calling: SABIHA DUNNE [[id number]]    What is the request in detail: pt would like to speak with a nurse in regards to possible referral for wrist.. Pt wants to go to [place].. Please advise    Can the clinic reply by [facility]: no    What Number to Call Back if not in [facility]: [phone]

## 2018-07-03 ENCOUNTER — OFFICE VISIT (OUTPATIENT)
Dept: OPTOMETRY | Facility: CLINIC | Age: 48
End: 2018-07-03
Payer: MEDICAID

## 2018-07-03 DIAGNOSIS — H53.8 BLURRY VISION, BILATERAL: ICD-10-CM

## 2018-07-03 DIAGNOSIS — H52.7 REFRACTIVE ERROR: ICD-10-CM

## 2018-07-03 DIAGNOSIS — E11.9 TYPE 2 DIABETES MELLITUS WITHOUT RETINOPATHY: ICD-10-CM

## 2018-07-03 DIAGNOSIS — H25.13 NUCLEAR SCLEROSIS, BILATERAL: Primary | ICD-10-CM

## 2018-07-03 PROCEDURE — 92134 CPTRZ OPH DX IMG PST SGM RTA: CPT | Mod: PBBFAC,PO | Performed by: OPTOMETRIST

## 2018-07-03 PROCEDURE — 99212 OFFICE O/P EST SF 10 MIN: CPT | Mod: PBBFAC,PO | Performed by: OPTOMETRIST

## 2018-07-03 PROCEDURE — 99999 PR PBB SHADOW E&M-EST. PATIENT-LVL II: CPT | Mod: PBBFAC,,, | Performed by: OPTOMETRIST

## 2018-07-03 PROCEDURE — 92014 COMPRE OPH EXAM EST PT 1/>: CPT | Mod: S$PBB,,, | Performed by: OPTOMETRIST

## 2018-07-03 PROCEDURE — 92015 DETERMINE REFRACTIVE STATE: CPT | Mod: ,,, | Performed by: OPTOMETRIST

## 2018-07-03 RX ORDER — LISDEXAMFETAMINE DIMESYLATE 40 MG/1
CAPSULE ORAL
Refills: 0 | COMMUNITY
Start: 2018-06-08 | End: 2019-09-25 | Stop reason: DRUGHIGH

## 2018-07-03 RX ORDER — HYDROCODONE BITARTRATE AND ACETAMINOPHEN 5; 325 MG/1; MG/1
1 TABLET ORAL EVERY 6 HOURS PRN
Qty: 12 TABLET | Refills: 0 | OUTPATIENT
Start: 2018-07-03

## 2018-07-03 RX ORDER — FUROSEMIDE 40 MG/1
TABLET ORAL
Refills: 0 | COMMUNITY
Start: 2018-05-18 | End: 2018-08-01

## 2018-07-03 NOTE — PROGRESS NOTES
Subjective:       Patient ID: Audrey Natarajan is a 47 y.o. female      Chief Complaint   Patient presents with    Concerns About Ocular Health    Diabetic Eye Exam     LBS: 123 this am      History of Present Illness  Dls: 8/25/17 Dr. Welch    48 y/o female presents today for diabetic eye exam.   Pt c/o blurry vision at distance and near ou. Pt wears bifocal glasses.   Pt states no tearing no itching no burning no pain no ha's no floaters.     Eye meds:  None    Hemoglobin A1C       Date                     Value               Ref Range           Status                06/13/2018               7.2 (H)             4.0 - 5.6 %         Final                  02/26/2018               6.1 (H)             4.0 - 5.6 %         Final                 11/06/2017               6.1 (H)             4.0 - 5.6 %         Final             ----------     Assessment/Plan:     1. Nuclear sclerosis, bilateral  Visually significant cataract. Discussed diagnosis with patient, different lens options, and surgical process. Referral to Dr. Jones for cataract evaluation.    - Ambulatory referral to Ophthalmology    2. Blurry vision, bilateral  Decrease in VA since last visit. No improvement in VA with SRx, ONH healthy, no CSME on OCT   - Posterior Segment OCT Retina-Both eyes    3. Type 2 diabetes mellitus without retinopathy  No diabetic retinopathy. Discussed with pt the effects of diabetes on vision, importance of good blood sugar control, compliance with meds, and follow up care with PCP. Return in 1 year for dilated eye exam, sooner PRN.    4. Refractive error  Hold on Srx, pt wishes to proceed with cataract consult.     Follow-up for Cataract consult with Dr. Jones.

## 2018-07-05 ENCOUNTER — INITIAL CONSULT (OUTPATIENT)
Dept: NEUROSURGERY | Facility: CLINIC | Age: 48
End: 2018-07-05
Payer: MEDICAID

## 2018-07-05 VITALS
BODY MASS INDEX: 37.45 KG/M2 | TEMPERATURE: 99 F | HEART RATE: 92 BPM | DIASTOLIC BLOOD PRESSURE: 68 MMHG | WEIGHT: 233 LBS | HEIGHT: 66 IN | SYSTOLIC BLOOD PRESSURE: 139 MMHG

## 2018-07-05 DIAGNOSIS — M53.86 DECREASED RANGE OF MOTION OF LUMBAR SPINE: ICD-10-CM

## 2018-07-05 DIAGNOSIS — R29.898 WEAKNESS OF LEFT LOWER EXTREMITY: ICD-10-CM

## 2018-07-05 DIAGNOSIS — E11.42 DIABETIC POLYNEUROPATHY ASSOCIATED WITH TYPE 2 DIABETES MELLITUS: Primary | ICD-10-CM

## 2018-07-05 PROBLEM — M54.42 CHRONIC BILATERAL LOW BACK PAIN WITH LEFT-SIDED SCIATICA: Status: ACTIVE | Noted: 2018-06-22

## 2018-07-05 PROCEDURE — 99999 PR PBB SHADOW E&M-EST. PATIENT-LVL III: CPT | Mod: PBBFAC,,, | Performed by: PHYSICIAN ASSISTANT

## 2018-07-05 PROCEDURE — 99213 OFFICE O/P EST LOW 20 MIN: CPT | Mod: PBBFAC,PO | Performed by: PHYSICIAN ASSISTANT

## 2018-07-05 PROCEDURE — 99204 OFFICE O/P NEW MOD 45 MIN: CPT | Mod: S$PBB,,, | Performed by: PHYSICIAN ASSISTANT

## 2018-07-05 NOTE — LETTER
July 5, 2018      Corey Rahman Jr., MD  [address]  [place] [address]           [facility]  [address]  [place] [address]  Phone: [phone]  Fax: [phone]          Patient: Audrey Natarajan   MR Number: [id number]   YOB: 1970   Date of Visit: 7/5/2018       Dear Dr. Corey Rahman Jr.:    Thank you for referring Audrey Natarajan to me for evaluation. Attached you will find relevant portions of my assessment and plan of care.    If you have questions, please do not hesitate to call me. I look forward to following Audrey Natarajan along with you.    Sincerely,    CAROL ANN Aguero  CC:  No Recipients    If you would like to receive this communication electronically, please contact [email or web address] or [phone] to request more information on [facility] access.    For providers and/or their staff who would like to refer a patient to [facility], please contact us through our one-stop-shop provider referral line, [facility], at [phone].    If you feel you have received this communication in error or would no longer like to receive these types of communications, please e-mail [email or web address]

## 2018-07-05 NOTE — TELEPHONE ENCOUNTER
Pt states she is in pain and would like to know what to do because she is out of the 12 hydrocodone that she was given in the ED for pain in her feet. Pt states she is still waiting on her insurance to approve her for ortho appt.

## 2018-07-05 NOTE — PATIENT INSTRUCTIONS
-Please bring a copy of your lumbar MRI disc to my office for review.   -Follow-up with orthopedics regarding your recent wrist and ankle injuries. Your referral has been authorized and you should be able to schedule an appointment now.   -Once I have reviewed your MRI and orthopedics has decided on a treatment plan, we can discuss continuing with the Healthy Back program vs returning to clinic to discuss surgical options.       Please call with any questions or concerns.

## 2018-07-05 NOTE — PROGRESS NOTES
[facility]  Neurosurgery    SUBJECTIVE:     History of Present Illness:  Audrey Natarajan is a 47 y.o. female with complex medical history including Asthma, Bipolar 1 disorder, h/o DVT, DM, Afib, and COPD who presents new to me with chronic LBP with left-sided radiculopathy. Symptoms have been present since ~2002. Bilateral low back pain >LLE>RLE. The pain begins in her low back and extends to the left buttocks, around to her groin, down her postero-medial thigh to her left foot. At other times, the pain does not extend to the groin and foot but rather runs down the lateral left thigh and stops at the knee. She endorses weakness of the left leg for many years and believes it began after vascular surgery on her left leg in ~2004. She also endorses numbness to her bilateral feet (DM neuropathy) and around her left ankle (recent injury). Denies saddle anesthesia, b/b dysfunction. She has had an increase in falls over the last 3-4 years because her left leg has been giving out on her. She has increased difficulty going up and down stairs due to left leg weakness.      In addition to LBP, she reports pain extending from her neck to her tailbone. She also has left shoulder pain and bilateral hand numbness (L>R) that has been present for many years. Denies changes in her handwriting but feels she has been dropping things because of decreased  strength.     She was previously taking Norco 5/325 ~1/2 tablet every 4 hours for the pain, but her new PCP does not prescribe narcotics. Additionally, she was seen by Dr. Rahman who recommended TFESI vs MBB/RFA but her insurance does not cover these treatments. She attended one session with [facility], but she fell ~ 2weeks ago and fractured her left wrist and thumb in additional to spraining her left ankle. She is now awaiting orthopedics referral authorization for a treatment plan and physical therapy restrictions. Over the counter pain  "relievers have not helped. Heat seems to help her neck/shoulder pain.       (Not in a hospital admission)    Review of patient's allergies indicates:   Allergen Reactions    Morphine Other (See Comments)     Patient had a psychotic episode after taking Morphine  Agitation, hallucinations    Penicillins Anaphylaxis     itching       Past Medical History:   Diagnosis Date    ADHD (attention deficit hyperactivity disorder)     Arthritis     Asthma     Bipolar 1 disorder     COPD (chronic obstructive pulmonary disease)     Coronary artery disease     A fib    Depression     bipolar manic depresson    Diabetes mellitus     DVT of lower extremity, bilateral July 2013    bilateral LE DVT. Estelita filter placed.     Encounter for blood transfusion     History of blood clots 1. Left Leg=2003; 2.Bilateral Groin=Blood Clots= 5 or 6/ 2013 & 7/2013; 3. LLL of Lung=7/2013;  4. Lt. Lower Leg=7/2013.     Pt. had 1st Blood Clot after Pohcsgbymsul=6611, & Last=2013. Estelita Filter= Rt.Lateral Neck.    HTN (hypertension) 6/6/2013    Pt states that she does not have hypertension    Hypercholesteremia     Irregular heartbeat     Neuromuscular disorder     neuropathy feet    PE (pulmonary embolism) July 2013     bilat LE DVT.     Restless leg syndrome      Past Surgical History:   Procedure Laterality Date    ABDOMINAL SURGERY      BILATERAL OOPHORECTOMY Bilateral 1/12/2015    Green' s filter Right 7/4/2012    Right Neck & Tunneled Down.    HERNIA REPAIR      "[place] of Hernias Repaires around th Belly Button.", pt. states    OVARIAN CYST REMOVAL  3/13/2014    FL REMOVAL OF OVARY/TUBE(S)      SPLENECTOMY, TOTAL  July 2003    TONSILLECTOMY      as a child    TYMPANOSTOMY TUBE PLACEMENT  1976    VEIN SURGERY  2003    Lt leg     Family History   Problem Relation Age of Onset    Hypertension Father     Diabetes Father     Heart disease Father     Cataracts Father     Diabetes Paternal Grandfather     " Heart disease Paternal Grandfather     No Known Problems Mother     Ovarian cancer Maternal Grandmother          from this. ? age     No Known Problems Sister     No Known Problems Brother     No Known Problems Maternal Aunt     No Known Problems Maternal Uncle     No Known Problems Paternal Aunt     No Known Problems Paternal Uncle     No Known Problems Maternal Grandfather     Ovarian cancer Paternal Grandmother     Uterine cancer Neg Hx     Breast cancer Neg Hx     Colon cancer Neg Hx     Amblyopia Neg Hx     Blindness Neg Hx     Cancer Neg Hx     Glaucoma Neg Hx     Macular degeneration Neg Hx     Retinal detachment Neg Hx     Strabismus Neg Hx     Stroke Neg Hx     Thyroid disease Neg Hx      Social History   Substance Use Topics    Smoking status: Current Every Day Smoker     Packs/day: 0.50     Years: 25.00     Types: Cigarettes    Smokeless tobacco: Never Used    Alcohol use No        Review of Systems:  Constitutional: no fever or chills  Eyes: no visual changes  ENT: no nasal congestion or sore throat  Respiratory: +cough  Cardiovascular: no chest pain or palpitations  Gastrointestinal: no nausea or vomiting, tolerating diet  Genitourinary: no hematuria or dysuria  Integument/Breast: no rash or pruritis  Hematologic/Lymphatic: no easy bruising or lymphadenopathy  Musculoskeletal: low back, LLE, left hip, left shoulder, neck and mid-back pain  Neurological: no seizures or tremors; +BLE numbness   Behavioral/Psych: no auditory or visual hallucinations; +depressed mood  Endocrine: no heat or cold intolerance    OBJECTIVE:     Vital Signs (Most Recent):  Temp: 99.1 °F (37.3 °C) (18 1340)  Pulse: 92 (18 1340)  BP: 139/68 (18 1340)    Physical Exam:  General: well developed, well nourished, no distress  Head: normocephalic, atraumatic  Neurologic: Alert and oriented. Thought content appropriate  GCS: Motor: 6/Verbal: 5/Eyes: 4 GCS Total: 15  Language: No  aphasia  Speech: No dysarthria  Cranial nerves: face symmetric, tongue midline, CN II-XII grossly intact.   Eyes: pupils equal, round, reactive to light with accommodation, EOMI.   Pulmonary: normal respirations, not labored, no accessory muscles used, +cough  Sensory: intact to light touch throughout; decreased sensation throughout LLE   Motor Strength: Moves all extremities spontaneously with good tone.      Strength  Deltoids Triceps Biceps Wrist Extension Wrist Flexion Hand    Upper: R 5/5 5/5 5/5 5/5 5/5 5/5    L 5/5 5/5 5/5 5/5 5/5 5/5     Iliopsoas Quadriceps Knee  Flexion Tibialis  anterior Gastro- cnemius EHL   Lower: R 5/5 5/5 5/5 5/5 5/5 5/5    L 3+/5 5/5 4/5 * * *   *Unable to assess 2/2 ankle injury     Zepeda: absent bilaterally  Clonus: absent on right; unable to assess left   Skin: warm, dry and intact, no rashes  Gait: normal  Tandem Gait: unable to assess 2/2 ankle injury     Cervical ROM: full  Lumbar ROM: limited in extension    SI Joint tenderness: positive bilaterally L>R    Pain on Hip ROM: positive in flexion and extension     Diagnostic Results:  Xray lumbar spine (11/2017)-personally reviewed  -No acute displaced fracture or dislocation of the lumbar spine  -Disc space height loss is noted at several levels although primarily involving L1-L2 and L5-S1.  -lower lumbar facet arthropathy.  -There is an IVC filter.    MRI Lumbar spine (outside imaging, pt to bring disc)  -L4-5 disc herniation L>R  -L5-S1 disc bulge R>L    ASSESSMENT/PLAN:     Audrey Natarajan is a 47 y.o. female with complex medical history as noted above who presents for evaluation of chronic LBP and LLE pain x16 years. She was evaluated by pain mgmt who recommended TFESI vs MBB/RFA but her insurance did not cover the procedures. She feels that 1/2 tab of Norco 5/325 is enough to keep her low back sxs at bay and keep her from going to the emergency room for pain relief, but her pcp does not prescribe narcotics for  chronic pain. She understands that neurosurgery has a similar policy in that we only prescribe narcotics for post-op pain. She is frustrated with the increasing falls and recent wrist/ankle injuries that are preventing her from trying conservative tx such as Healthy back program and walking around her neighborhood.     She has noticeable left HF and knee flexion weakness on exam along with decreased sensation throughout the entire left leg; However, she reports both of these findings have been present for many years since vascular surgery to have a vein removed from her left leg in 2003. Pt will bring a copy of lumbar disc for review. Additionally, she will need to be evaluated by orthopedics to r/o the need for orthopedic sx and obtain PT restrictions. After she sees ortho and MRI is reviewed, FU appt will be scheduled. Plan for pt to complete 6-8 weeks of the Healthy Back program prior to discussion of lumbar surgery.     Plan reviewed with patient. All questions answered. 60 minutes spent with patient with >50% spent on counseling.     Please feel free to call with any further questions        Rhona Cortés PA-C  [facility]  Department of Neurosurgery  [phone]

## 2018-07-06 ENCOUNTER — TELEPHONE (OUTPATIENT)
Dept: FAMILY MEDICINE | Facility: CLINIC | Age: 48
End: 2018-07-06

## 2018-07-06 RX ORDER — IBUPROFEN 800 MG/1
800 TABLET ORAL 3 TIMES DAILY
Qty: 90 TABLET | Refills: 1 | Status: SHIPPED | OUTPATIENT
Start: 2018-07-06 | End: 2019-01-02 | Stop reason: SDUPTHER

## 2018-07-06 NOTE — TELEPHONE ENCOUNTER
----- Message from Alena Plasencia sent at 7/6/2018  1:49 PM CDT -----  Contact: self  Patient is calling for a prescription for a pain pill but has not seen a doctor please give her a call back at [phone]

## 2018-07-06 NOTE — TELEPHONE ENCOUNTER
----- Message from Aleah Tavarez sent at 7/5/2018  4:22 PM CDT -----  Contact: SABIHA [phone]  Patient is requesting call back in regards to her knee. Please call at your earliest convenience.

## 2018-07-06 NOTE — TELEPHONE ENCOUNTER
Patient orders and notes have been faxed to [facility] Orthopedic dept @ [phone]

## 2018-07-06 NOTE — TELEPHONE ENCOUNTER
"Pt was informed that MD wasn't going to prescribe hydrocodone. Pt stated " I don't care what kind of pain med it is I just want something for pain. She said she was going to call our supervisor to let them know what was going on so I call Nory CHOI RN to the phone because pt was screaming on the phone about getting pain meds. After speaking with Nory pt was put on hold and I began to talk with the pt informed her after speaking with  that he would not prescribe any pain meds again. She stated she would like to get ibuprofen 800 mg because she has been taking 800 mg and 600 mg rotating them through out the day. I informed the pt that I would send a message to see if MD would send a script for ibuprofen.  "

## 2018-07-07 ENCOUNTER — HOSPITAL ENCOUNTER (EMERGENCY)
Facility: HOSPITAL | Age: 48
Discharge: HOME | End: 2018-07-07
Payer: MEDICAID

## 2018-07-07 VITALS
SYSTOLIC BLOOD PRESSURE: 129 MMHG | HEIGHT: 66 IN | RESPIRATION RATE: 18 BRPM | TEMPERATURE: 99 F | OXYGEN SATURATION: 97 % | BODY MASS INDEX: 36.48 KG/M2 | DIASTOLIC BLOOD PRESSURE: 69 MMHG | HEART RATE: 78 BPM | WEIGHT: 227 LBS

## 2018-07-07 DIAGNOSIS — M25.532 LEFT WRIST PAIN: Primary | ICD-10-CM

## 2018-07-07 DIAGNOSIS — R52 PAIN: ICD-10-CM

## 2018-07-07 DIAGNOSIS — M25.571 RIGHT ANKLE PAIN: ICD-10-CM

## 2018-07-07 DIAGNOSIS — J44.1 COPD EXACERBATION: ICD-10-CM

## 2018-07-07 PROCEDURE — 99283 EMERGENCY DEPT VISIT LOW MDM: CPT | Mod: 25

## 2018-07-07 PROCEDURE — 25000003 PHARM REV CODE 250: Performed by: PHYSICIAN ASSISTANT

## 2018-07-07 PROCEDURE — 29125 APPL SHORT ARM SPLINT STATIC: CPT

## 2018-07-07 RX ORDER — DEXTROMETHORPHAN HYDROBROMIDE, GUAIFENESIN 5; 100 MG/5ML; MG/5ML
650 LIQUID ORAL EVERY 8 HOURS PRN
Qty: 30 TABLET | Refills: 0 | Status: SHIPPED | OUTPATIENT
Start: 2018-07-07 | End: 2018-08-01

## 2018-07-07 RX ORDER — ACETAMINOPHEN 500 MG
500 TABLET ORAL
Status: COMPLETED | OUTPATIENT
Start: 2018-07-07 | End: 2018-07-07

## 2018-07-07 RX ADMIN — ACETAMINOPHEN 500 MG: 500 TABLET, FILM COATED ORAL at 02:07

## 2018-07-07 NOTE — DISCHARGE INSTRUCTIONS
You may alternate Tylenol and meloxicam, taking each as directed.  Follow up with orthopedic clinic at [facility]

## 2018-07-07 NOTE — ED PROVIDER NOTES
"Encounter Date: 7/7/2018       History     Chief Complaint   Patient presents with    Fall     Pt reports she slipped down steps, hit her right knee, and twisted her right ankle.  Pt reports taking ibuprofen last night at 2000.  Pt reports she was just seen here for a fall with injuries 11 days ago.  Pt states, "I need something for pain".      This is a 47-year-old female who has multiple complaints of pain from a fall last night, as well as a separate fall from last month.  She reports mild right knee and right ankle pain from last night when she tripped and fell toward the door, but did not fall to the ground.  She has been ambulatory with a limp.  She also reports left wrist pain from a fall last month.  She was seen in this ED and evaluated for left wrist pain, head pain, neck pain, and back pain. She was placed in a splint for a left wrist injury, and has been coordinating with her primary care physician for a referral to [facility]. She explains she was advised by someone in the ED to take the splint off after 3 days.  She has been taking ibuprofen and meloxicam for pain. She reports minimal relief.            Review of patient's allergies indicates:   Allergen Reactions    Morphine Other (See Comments)     Patient had a psychotic episode after taking Morphine  Agitation, hallucinations    Penicillins Anaphylaxis     itching     Past Medical History:   Diagnosis Date    ADHD (attention deficit hyperactivity disorder)     Arthritis     Asthma     Bipolar 1 disorder     COPD (chronic obstructive pulmonary disease)     Coronary artery disease     A fib    Depression     bipolar manic depresson    Diabetes mellitus     DVT of lower extremity, bilateral July 2013    bilateral LE DVT. [place] filter placed.     Encounter for blood transfusion     History of blood clots 1. Left Leg=2003; 2.Bilateral Groin=Blood Clots= 5 or 6/ 2013 & 7/2013; 3. LLL of Lung=7/2013;  4. Lt. " "Lower Leg=2013.     Pt. had 1st Blood Clot after Abwneeinlwqe=3725, & Last=. [place] Filter= Rt.Lateral Neck.    HTN (hypertension) 2013    Pt states that she does not have hypertension    Hypercholesteremia     Irregular heartbeat     Neuromuscular disorder     neuropathy feet    PE (pulmonary embolism) 2013     bilat LE DVT.     Restless leg syndrome      Past Surgical History:   Procedure Laterality Date    ABDOMINAL SURGERY      BILATERAL OOPHORECTOMY Bilateral 2015    Green' s filter Right 2012    Right Neck & Tunneled Down.    HERNIA REPAIR      "[place] of Hernias Repaires around th [place].", pt. states    OVARIAN CYST REMOVAL  3/13/2014    [place] REMOVAL OF OVARY/TUBE(S)      SPLENECTOMY, TOTAL  2003    TONSILLECTOMY      as a child    TYMPANOSTOMY TUBE PLACEMENT  1976    VEIN SURGERY      Lt leg     Family History   Problem Relation Age of Onset    Hypertension Father     Diabetes Father     Heart disease Father     Cataracts Father     Diabetes Paternal Grandfather     Heart disease Paternal Grandfather     No Known Problems Mother     Ovarian cancer Maternal Grandmother          from this. ? age     No Known Problems Sister     No Known Problems Brother     No Known Problems Maternal Aunt     No Known Problems Maternal Uncle     No Known Problems Paternal Aunt     No Known Problems Paternal Uncle     No Known Problems Maternal Grandfather     Ovarian cancer Paternal Grandmother     Uterine cancer Neg Hx     Breast cancer Neg Hx     Colon cancer Neg Hx     Amblyopia Neg Hx     Blindness Neg Hx     Cancer Neg Hx     Glaucoma Neg Hx     Macular degeneration Neg Hx     Retinal detachment Neg Hx     Strabismus Neg Hx     Stroke Neg Hx     Thyroid disease Neg Hx      Social History   Substance Use Topics    Smoking status: Current Every Day Smoker     Packs/day: 0.50     Years: 25.00     Types: Cigarettes    Smokeless tobacco: " Never Used    Alcohol use No     Review of Systems   Constitutional: Negative for fever.   HENT: Negative for sore throat.    Respiratory: Negative for shortness of breath.    Cardiovascular: Negative for chest pain.   Gastrointestinal: Negative for nausea.   Genitourinary: Negative for dysuria.   Musculoskeletal: Positive for arthralgias (Left wrist, right knee, right ankle).   Skin: Negative for rash.   Neurological: Negative for weakness.   Hematological: Does not bruise/bleed easily.       Physical Exam     Initial Vitals [07/07/18 0140]   BP Pulse Resp Temp SpO2   (!) 179/83 100 16 97.7 °F (36.5 °C) 96 %      MAP       --         Physical Exam    Vitals reviewed.  Constitutional: She appears well-developed and well-nourished. She is not diaphoretic. No distress.   HENT:   Head: Normocephalic and atraumatic.   Right Ear: External ear normal.   Left Ear: External ear normal.   Nose: Nose normal.   Eyes: Conjunctivae are normal. No scleral icterus.   Neck: Normal range of motion. Neck supple.   Cardiovascular: Normal rate, regular rhythm, normal heart sounds and intact distal pulses.   Pulmonary/Chest: Breath sounds normal. No respiratory distress. She has no wheezes. She has no rhonchi. She has no rales. She exhibits no tenderness.   Musculoskeletal: Normal range of motion. She exhibits tenderness. She exhibits no edema.   Mild tenderness to the distal left radius with no deformity.  Mild snuffbox tenderness of the left wrist.  Pain with range of motion.  Patient able to supinate and pronate the left hand.    Mild tenderness to the anterior right knee with no obvious edema, deformity, laxity, or crepitus.  Extensor mechanism intact.  Patient walks with minimal limp.  She has minimal tenderness to the lateral malleolus of the right ankle with no edema, deformity, erythema, or warmth.  Right leg compartments are soft and nontender.   Neurological: She is alert and oriented to person, place, and time. No sensory  deficit.   Skin: Skin is warm and dry. No rash noted. No erythema. No pallor.         ED Course   Procedures  Labs Reviewed - No data to display       Imaging Results          X-Ray Knee 3 View Right (Final result)  Result time 07/07/18 02:59:26    Final result by Shalom Bro MD (07/07/18 02:59:26)                 Impression:      No acute osseous abnormality identified.      Electronically signed by: Shalom Bro MD  Date:    07/07/2018  Time:    02:59             Narrative:    EXAMINATION:  XR KNEE 3 VIEW RIGHT    CLINICAL HISTORY:  Pain, unspecified    TECHNIQUE:  AP, lateral, and Merchant views of the right knee were performed.    COMPARISON:  None    FINDINGS:  No evidence of fracture, dislocation, or osseous destructive process.  Joint spaces of the knee are preserved.  No significant suprapatellar joint effusion.                               X-Ray Ankle Complete Right (Final result)  Result time 07/07/18 02:58:11    Final result by Shalom Bro MD (07/07/18 02:58:11)                 Impression:      No acute osseous abnormality identified.      Electronically signed by: Shalom Bro MD  Date:    07/07/2018  Time:    02:58             Narrative:    EXAMINATION:  XR ANKLE COMPLETE 3 VIEW RIGHT    CLINICAL HISTORY:  Pain in right ankle and joints of right foot    TECHNIQUE:  AP, lateral, and oblique images of the right ankle were performed.    COMPARISON:  None    FINDINGS:  No evidence of fracture, dislocation, or osseous destructive process.  Ankle mortise is maintained.  Soft tissue swelling is seen about the ankle.  Posterior and plantar calcaneal spurring is seen.                              X-Rays:   Independently Interpreted Readings:   Other Readings:  X-rays of the right knee and right ankle are without fracture or dislocation    Medical Decision Making:   ED Management:  Patient with right knee and ankle pain from a mechanical fall this evening, and left wrist pain from a previous  fall.  X-rays reviewed from previous ED visit show questionable distal radius fracture.  Patient was splinted at that time, however, she removed the splint 3 days later.  Patient continues to have mild tenderness to the distal radius and snuffbox tenderness. Will reapply thumb spica splint, as patient is waiting for orthopedic follow-up appointment.  X-rays of the right knee and right ankle tonight are negative for fracture or dislocation.  Low suspicion for serious injury. Patient treated with Tylenol in the ED, discharged with instructions to continue Tylenol and/or Mobic for pain. She was also given instructions for rest, ice, elevation.  Patient verbalized understanding and agreed with plan.                      Clinical Impression:   The primary encounter diagnosis was Left wrist pain. Diagnoses of Pain and Right ankle pain were also pertinent to this visit.                             Zack Reed PA-C  07/07/18 0406

## 2018-07-07 NOTE — ED NOTES
The pt was educated and return demonstration was given from the pt, on how to care for splint, check capillary refills, and instructed to take the cast off if any numbness was present.

## 2018-07-07 NOTE — ED TRIAGE NOTES
Pt arrived from home sp a fall at home around 11 days ago. Pt was seen here after injury and went to follow up and got referral but insurance has not been approved yet. Pt states she has run out of pain medicine and needs something more. Pt has ice noted to left wrist.

## 2018-07-09 RX ORDER — MOMETASONE FUROATE AND FORMOTEROL FUMARATE DIHYDRATE 100; 5 UG/1; UG/1
AEROSOL RESPIRATORY (INHALATION)
Qty: 13 G | Refills: 0 | Status: SHIPPED | OUTPATIENT
Start: 2018-07-09 | End: 2018-08-08 | Stop reason: SDUPTHER

## 2018-07-11 ENCOUNTER — CLINICAL SUPPORT (OUTPATIENT)
Dept: SMOKING CESSATION | Facility: CLINIC | Age: 48
End: 2018-07-11
Payer: COMMERCIAL

## 2018-07-11 ENCOUNTER — TELEPHONE (OUTPATIENT)
Dept: SMOKING CESSATION | Facility: CLINIC | Age: 48
End: 2018-07-11

## 2018-07-11 DIAGNOSIS — F17.200 NICOTINE DEPENDENCE: ICD-10-CM

## 2018-07-11 PROCEDURE — 99407 BEHAV CHNG SMOKING > 10 MIN: CPT | Mod: S$GLB,,,

## 2018-07-11 RX ORDER — IBUPROFEN 200 MG
1 TABLET ORAL DAILY
Qty: 28 PATCH | Refills: 0 | Status: SHIPPED | OUTPATIENT
Start: 2018-07-11 | End: 2018-08-20 | Stop reason: SDUPTHER

## 2018-07-11 NOTE — TELEPHONE ENCOUNTER
[facility]- called to check on patient, no show for  appointment. Left message to call back to reschedule [phone]

## 2018-07-17 DIAGNOSIS — J06.9 UPPER RESPIRATORY TRACT INFECTION, UNSPECIFIED TYPE: ICD-10-CM

## 2018-07-17 RX ORDER — FLUTICASONE PROPIONATE 50 MCG
1 SPRAY, SUSPENSION (ML) NASAL DAILY
Qty: 1 BOTTLE | Refills: 2 | Status: SHIPPED | OUTPATIENT
Start: 2018-07-17 | End: 2018-07-18 | Stop reason: CLARIF

## 2018-07-18 RX ORDER — FLUTICASONE PROPIONATE 50 MCG
1 SPRAY, SUSPENSION (ML) NASAL DAILY
Qty: 16 G | Refills: 1 | Status: SHIPPED | OUTPATIENT
Start: 2018-07-18 | End: 2018-10-31 | Stop reason: SDUPTHER

## 2018-07-19 RX ORDER — MELOXICAM 15 MG/1
TABLET ORAL NIGHTLY PRN
Refills: 1 | COMMUNITY
Start: 2018-07-07 | End: 2018-10-31

## 2018-07-20 ENCOUNTER — TELEPHONE (OUTPATIENT)
Dept: FAMILY MEDICINE | Facility: CLINIC | Age: 48
End: 2018-07-20

## 2018-07-20 NOTE — TELEPHONE ENCOUNTER
"I tried to call patient under the direction of Dr Torres to inform her that he does'nt and won't refill pain medication and she started cussing me out by stating "i am tired of being !%$#@#  told that I am !%$#@#@ looking for pain meds !%$#@# and then she released the call after all of the irate profanity was used.  "

## 2018-07-23 ENCOUNTER — OFFICE VISIT (OUTPATIENT)
Dept: FAMILY MEDICINE | Facility: CLINIC | Age: 48
End: 2018-07-23
Payer: MEDICAID

## 2018-07-23 VITALS
OXYGEN SATURATION: 96 % | TEMPERATURE: 98 F | HEART RATE: 103 BPM | DIASTOLIC BLOOD PRESSURE: 78 MMHG | SYSTOLIC BLOOD PRESSURE: 140 MMHG | RESPIRATION RATE: 17 BRPM | BODY MASS INDEX: 37.35 KG/M2 | WEIGHT: 232.38 LBS | HEIGHT: 66 IN

## 2018-07-23 DIAGNOSIS — S89.92XD INJURY OF LEFT KNEE, SUBSEQUENT ENCOUNTER: ICD-10-CM

## 2018-07-23 DIAGNOSIS — S92.415D CLOSED NONDISPLACED FRACTURE OF PROXIMAL PHALANX OF LEFT GREAT TOE WITH ROUTINE HEALING, SUBSEQUENT ENCOUNTER: ICD-10-CM

## 2018-07-23 DIAGNOSIS — S69.92XD INJURY OF LEFT WRIST, SUBSEQUENT ENCOUNTER: Primary | ICD-10-CM

## 2018-07-23 PROCEDURE — 99999 PR PBB SHADOW E&M-EST. PATIENT-LVL III: CPT | Mod: PBBFAC,,, | Performed by: FAMILY MEDICINE

## 2018-07-23 PROCEDURE — 99213 OFFICE O/P EST LOW 20 MIN: CPT | Mod: PBBFAC,PN | Performed by: FAMILY MEDICINE

## 2018-07-23 PROCEDURE — 99214 OFFICE O/P EST MOD 30 MIN: CPT | Mod: S$PBB,,, | Performed by: FAMILY MEDICINE

## 2018-07-23 NOTE — PROGRESS NOTES
Routine Office Visit    Patient Name: Audrey Natarajan    : 1970  MRN: [id number]    Subjective:  Audrey is a 47 y.o. female who presents today for:   Chief Complaint   Patient presents with    Follow-up     from fall 2018(left) hand & foot     47-year-old female comes in for follow-up from a fall that she had in  where she had injured her left wrist left knee and left foot.  She had fractured her left 3rd toe.  She was under the impression that she had also fractured her wrist.  She has since been to the emergency room twice.  She has an appointment scheduled with [facility] at [place] on .  She states that she still has some pain in all locations but it is much better than previous.  The swelling is much improved.  She is still using a soft cast around her wrist area.      Past Medical History  Past Medical History:   Diagnosis Date    ADHD (attention deficit hyperactivity disorder)     Arthritis     Asthma     Bipolar 1 disorder     COPD (chronic obstructive pulmonary disease)     Coronary artery disease     A fib    Depression     bipolar manic depresson    Diabetes mellitus     DVT of lower extremity, bilateral 2013    bilateral LE DVT. [place] filter placed.     Encounter for blood transfusion     History of blood clots 1. Left Leg=; 2.Bilateral Groin=Blood Clots= 5 or 2013 & 2013; 3. LLL of Lung=2013;  4. Lt. Lower Leg=2013.     Pt. had 1st Blood Clot after Zpqfnhgnbkes=0866, & Last=. [place] Filter= Rt.Lateral Neck.    HTN (hypertension) 2013    Pt states that she does not have hypertension    Hypercholesteremia     Irregular heartbeat     Neuromuscular disorder     neuropathy feet    PE (pulmonary embolism) 2013     bilat LE DVT.     Restless leg syndrome        Past Surgical History  Past Surgical History:   Procedure Laterality Date    ABDOMINAL SURGERY      BILATERAL OOPHORECTOMY Bilateral 2015    Green' s filter  "Right 2012    Right Neck & Tunneled Down.    HERNIA REPAIR      "[place] of Hernias Repaires around th [place].", pt. states    OVARIAN CYST REMOVAL  3/13/2014    [place] REMOVAL OF OVARY/TUBE(S)      SPLENECTOMY, TOTAL  2003    TONSILLECTOMY      as a child    TYMPANOSTOMY TUBE PLACEMENT  1976    VEIN SURGERY      Lt leg        Family History  Family History   Problem Relation Age of Onset    Hypertension Father     Diabetes Father     Heart disease Father     Cataracts Father     Diabetes Paternal Grandfather     Heart disease Paternal Grandfather     No Known Problems Mother     Ovarian cancer Maternal Grandmother          from this. ? age     No Known Problems Sister     No Known Problems Brother     No Known Problems Maternal Aunt     No Known Problems Maternal Uncle     No Known Problems Paternal Aunt     No Known Problems Paternal Uncle     No Known Problems Maternal Grandfather     Ovarian cancer Paternal Grandmother     Uterine cancer Neg Hx     Breast cancer Neg Hx     Colon cancer Neg Hx     Amblyopia Neg Hx     Blindness Neg Hx     Cancer Neg Hx     Glaucoma Neg Hx     Macular degeneration Neg Hx     Retinal detachment Neg Hx     Strabismus Neg Hx     Stroke Neg Hx     Thyroid disease Neg Hx        Social History  Social History     Social History    Marital status:      Spouse name: N/A    Number of children: N/A    Years of education: N/A     Occupational History    Not on file.     Social History Main Topics    Smoking status: Current Every Day Smoker     Packs/day: 0.50     Years: 25.00     Types: Cigarettes    Smokeless tobacco: Never Used    Alcohol use No    Drug use: No    Sexual activity: Not Currently     Other Topics Concern    Not on file     Social History Narrative    No narrative on file       Current Medications  Current Outpatient Prescriptions on File Prior to Visit   Medication Sig Dispense Refill    albuterol " (VENTOLIN HFA) 90 mcg/actuation inhaler INHALE 2 PUFFS BY MOUTH INTO THE LUNGS EVERY 6 HOURS AS NEEDED FOR WHEEZING. RESCUE. 18 g 2    aspirin (ECOTRIN) 81 MG EC tablet Take 81 mg by mouth.      b complex vitamins tablet Take 1 tablet by mouth 2 (two) times daily.       blood sugar diagnostic Strp To check BG once daily, to use with insurance preferred meter 30 each 5    blood-glucose meter kit To check BG once daily, to use with insurance preferred meter 1 each 0    carbamazepine (TEGRETOL) 200 mg tablet TK 2 TS PO BID  1    clonazePAM (KLONOPIN) 1 MG tablet TK 1 T PO BID PRA AND INSOMNIA  1    cyanocobalamin (VITAMIN B-12) 1000 MCG tablet Take 100 mcg by mouth once daily.      DULERA 100-5 mcg/actuation HFAA INHALE 2 PUFFS BY MOUTH TWICE DAILY 13 g 0    fish oil-omega-3 fatty acids 300-1,000 mg capsule Take 2 g by mouth once daily. Instructed to stop 5-7 days before surgery (8/11/16).      fluticasone (FLONASE ALLERGY RELIEF) 50 mcg/actuation nasal spray 1 spray (50 mcg total) by Each Nare route once daily. 16 g 1    furosemide (LASIX) 20 MG tablet TAKE 1 TABLET BY MOUTH EVERY DAY 30 tablet 2    furosemide (LASIX) 40 MG tablet   0    gabapentin (NEURONTIN) 600 MG tablet TAKE 2 TABLETS(1200 MG) BY MOUTH THREE TIMES DAILY 180 tablet 1    hydrOXYzine HCl (ATARAX) 25 MG tablet TK 1 T PO QD  2    ibuprofen (ADVIL,MOTRIN) 800 MG tablet Take 1 tablet (800 mg total) by mouth 3 (three) times daily. 90 tablet 1    lancets Misc To check BG once daily, to use with insurance preferred meter 30 each 2    lisinopril (PRINIVIL,ZESTRIL) 5 MG tablet TAKE 1 TABLET(5 MG) BY MOUTH EVERY DAY 30 tablet 2    meloxicam (MOBIC) 15 MG tablet   1    metFORMIN (GLUCOPHAGE) 1000 MG tablet TAKE 1 TABLET(1000 MG) BY MOUTH TWICE DAILY WITH MEALS 60 tablet 2    methocarbamol (ROBAXIN) 500 MG Tab Take 500 mg by mouth 4 (four) times daily.      metoprolol tartrate (LOPRESSOR) 25 MG tablet Take 1 tablet (25 mg total) by mouth  "once daily. 30 tablet 5    multivitamin (THERAGRAN) per tablet Take 1 tablet by mouth every morning.      mupirocin (BACTROBAN) 2 % ointment EDIE EXT AA BID  3    nicotine (NICODERM CQ) 21 mg/24 hr Place 1 patch onto the skin once daily. 28 patch 0    nicotine polacrilex 2 MG Lozg 1-2 per hour in place of cigarettes maximum of 20 per day. 168 lozenge 0    omeprazole 20 mg TbEC TAKE 2 TABLETS BY MOUTH ONCE DAILY AT 6AM (Patient taking differently: one tablet twice daily) 90 each 1    pravastatin (PRAVACHOL) 40 MG tablet Take 1 tablet (40 mg total) by mouth once daily. 90 tablet 3    risperidone (RISPERDAL) 3 MG Tab take at bedtime  1    risperiDONE (RISPERDAL) 4 MG tablet       VYVANSE 30 mg capsule TK ONE C PO QAM  0    VYVANSE 40 mg Cap TK ONE C PO QAM  0    acetaminophen (TYLENOL) 650 MG TbSR Take 1 tablet (650 mg total) by mouth every 8 (eight) hours as needed. 30 tablet 0    HYDROcodone-acetaminophen (NORCO) 5-325 mg per tablet Take 1 tablet by mouth every 6 (six) hours as needed for Pain. 12 tablet 0    lidocaine (LIDODERM) 5 % Place 1 patch onto the skin once daily. Remove & Discard patch within 12 hours or as directed by MD. May use 4% formulation if more affordable for patient. 6 patch 0     No current facility-administered medications on file prior to visit.        Allergies   Review of patient's allergies indicates:   Allergen Reactions    Morphine Other (See Comments)     Patient had a psychotic episode after taking Morphine  Agitation, hallucinations    Penicillins Anaphylaxis     itching       Review of Systems   Constitutional: Negative for chills and fever.   Musculoskeletal:        See HPI   Skin: Negative for pallor.         BP (!) 140/78 (BP Location: Right arm, Patient Position: Sitting, BP Method: Large (Manual))   Pulse 103   Temp 98.2 °F (36.8 °C) (Oral)   Resp 17   Ht 5' 6" (1.676 m)   Wt 105.4 kg (232 lb 5.8 oz)   LMP 11/01/2011 (LMP Unknown) Comment: stated when she was " 37  SpO2 96%   BMI 37.50 kg/m²     Physical Exam   Constitutional: She appears well-developed and well-nourished.   HENT:   Head: Normocephalic and atraumatic.   Right Ear: External ear normal.   Left Ear: External ear normal.   Nose: Nose normal.   Mouth/Throat: Oropharynx is clear and moist. No oropharyngeal exudate.   Eyes: Conjunctivae and EOM are normal. Pupils are equal, round, and reactive to light. Right eye exhibits no discharge. Left eye exhibits no discharge.   Neck: Normal range of motion. Neck supple. No tracheal deviation present.   Cardiovascular: Normal rate, regular rhythm, normal heart sounds and intact distal pulses.    No murmur heard.  Pulmonary/Chest: Effort normal and breath sounds normal. She has no wheezes. She has no rales.   Abdominal: Soft. Bowel sounds are normal. She exhibits no mass. There is no tenderness.   Musculoskeletal:        Left wrist: She exhibits normal range of motion.        Left knee: She exhibits normal range of motion and no swelling.        Left ankle: She exhibits normal range of motion.   Sensation intact in all left fingers, normal color in all fingers   Lymphadenopathy:     She has no cervical adenopathy.   Psychiatric: She has a normal mood and affect.   Vitals reviewed.      Assessment/Plan:  Audrey was seen today for fall follow-up.  She is an established patient new to me    Diagnoses and all orders for this visit:    Injury of left wrist, subsequent encounter  Patient's x-ray was reviewed with her.  Showed her that x-ray did not show any fracture.  However I did advise her to keep her appointment with [facility] for further evaluation.      Injury of left knee, subsequent encounter  Follow-up with [facility] as scheduled    Closed nondisplaced fracture of proximal phalanx of left great toe with routine healing, subsequent encounter  Follow-up with [facility] as scheduled        This office note has been dictated.  This dictation has  been generated using M-Modal Fluency Direct dictation; some phonetic errors may occur.

## 2018-08-01 ENCOUNTER — OFFICE VISIT (OUTPATIENT)
Dept: URGENT CARE | Facility: CLINIC | Age: 48
End: 2018-08-01
Payer: MEDICAID

## 2018-08-01 VITALS
WEIGHT: 232 LBS | SYSTOLIC BLOOD PRESSURE: 157 MMHG | HEART RATE: 105 BPM | OXYGEN SATURATION: 98 % | DIASTOLIC BLOOD PRESSURE: 75 MMHG | TEMPERATURE: 98 F | RESPIRATION RATE: 16 BRPM | BODY MASS INDEX: 37.28 KG/M2 | HEIGHT: 66 IN

## 2018-08-01 DIAGNOSIS — R59.9 SWELLING OF LYMPH NODE: Primary | ICD-10-CM

## 2018-08-01 PROCEDURE — 99214 OFFICE O/P EST MOD 30 MIN: CPT | Mod: S$GLB,,, | Performed by: NURSE PRACTITIONER

## 2018-08-01 RX ORDER — AZITHROMYCIN 250 MG/1
TABLET, FILM COATED ORAL
Qty: 6 TABLET | Refills: 0 | Status: SHIPPED | OUTPATIENT
Start: 2018-08-01 | End: 2018-08-06

## 2018-08-01 NOTE — PATIENT INSTRUCTIONS
Lymphadenopathy  Lymphadenopathy is swelling of the lymph nodes. Lymph nodes are small, bean-shaped glands around the body.  What are lymph nodes?  Lymph nodes are part of your immune system. The glands are found in your neck, armpits, groin, chest, and abdomen. They act as filters for lymph fluid as it flows through your body. Lymph fluid contains white blood cells and other things that fight infection.  Why lymph nodes swell  Lymphadenopathy is very common. The glands often enlarge during a viral or bacterial infection. It can happen during a cold, the flu, or strep throat. The nodes may swell in just one area of the body, such as the neck (localized). Or nodes may swell all over the body (generalized). The neck (cervical) lymph nodes are the most common site of lymphadenopathy.  What causes lymphadenopathy?  Dead cells and fluid build up in the lymph nodes as they help fight infection or disease. This causes them to swell in size. Enlarged lymph nodes are often near the source of infection. This can help to find the cause of an infection. For example, swollen lymph nodes around the jaw may be because of an infection in the teeth or mouth. But lymphadenopathy may also be generalized. This is common in some viral illnesses such as mononucleosis or chickenpox (varicella).  Lymphadenopathy can also be caused by:  · Infection of a lymph node or small group of nodes (lymphadenitis)  · Cancer  · Reactions to medicines such as antibiotics and some seizure medicines  · Other health conditions, such as lupus  Symptoms of lymphadenopathy  Lymphadenopathy can cause symptoms such as:  · Lumps under the jaw, on the sides or back of the neck, in the armpits, in the groin, or in the chest or belly (abdomen)  · Pain or tenderness in any of these areas  · Redness or warmth in any of these areas  You may also have symptoms from an infection causing the swollen glands. These symptoms may include fever, sore throat, body aches, or  cough.  Diagnosing lymphadenopathy  Your health care provider will ask about your health history and symptoms. He or she will give you a physical exam and check the areas where lymph nodes are enlarged. Your health care provider will check the size and location of the nodes, and ask how long they have been swollen and if they are painful. Diagnostic tests and referral to specialists may be recommended. They may include:  · Blood tests. These are done to check for signs of infection and other problems.  · Urine test. This is also done to check for infection and other problems.  · Chest X-ray. This test can show enlarged lymph nodes or other problems.  · Lymph node biopsy. If lymph nodes are swollen for 3 to 4 weeks, they may be checked with a biopsy. Small samples of lymph node tissue are taken and checked in a lab for signs of cancer. You may be referred to a specialist in blood disorders and cancer (hematologist and oncologist).  Treatment for lymphadenopathy  The treatment of enlarged lymph nodes depends on the cause. Enlarged lymph nodes are often harmless and go away without any treatment. Treatment is most often done on the cause of the enlarged nodes and may include:  · Antibiotic medicine to treat a bacterial infection  · Incision and drainage (I & D) of a lymph node for lymphadenitis  · Other medicines or procedures to treat the cause of the enlarged nodes  You may need follow-up exam in 3 to 4 weeks to recheck enlarged nodes.     When to call your health care provider  Call your health care provider if you have lymph nodes that are still swollen after 3 to 4 weeks.   Date Last Reviewed: 6/19/2015  © 5774-1619 [employer]. [address] [place] [address], [place] [address]. All rights reserved. This information is not intended as a substitute for professional medical care. Always follow your healthcare professional's instructions.    Please return here or go to the Emergency Department for any concerns  or worsening of condition.  If you were prescribed antibiotics, please take them to completion.  If you were prescribed a narcotic medication, do not drive or operate heavy equipment or machinery while taking these medications.  Please follow up with your primary care doctor or specialist as needed.    If you  smoke, please stop smoking.

## 2018-08-01 NOTE — PROGRESS NOTES
"Subjective:       Patient ID: Audrey Natarajan is a 48 y.o. female.    Vitals:  height is 5' 6" (1.676 m) and weight is 105.2 kg (232 lb). Her temperature is 97.9 °F (36.6 °C). Her blood pressure is 157/75 (abnormal) and her pulse is 105. Her respiration is 16 and oxygen saturation is 98%.     Chief Complaint: Jaw Pain    Pt reports appt scheduled with PCP 2d from today.    Reports tenderness on the LEFT underside of the jaw, described as "similar to a swollen gland."      Facial Pain   This is a new problem. The current episode started yesterday. The problem occurs constantly. The problem has been unchanged. Pertinent negatives include no chills, fever, rash or sore throat.     Review of Systems   Constitution: Negative for chills and fever.   HENT: Negative for sore throat.    Respiratory: Negative for shortness of breath.    Skin: Negative for itching and rash.   Musculoskeletal: Negative for joint pain.       Objective:      Physical Exam   Constitutional: She is oriented to person, place, and time. She appears well-developed and well-nourished.   HENT:   Head: Normocephalic and atraumatic. Head is without abrasion, without contusion and without laceration.   Right Ear: External ear normal.   Left Ear: External ear normal.   Nose: Nose normal.   Mouth/Throat: Oropharynx is clear and moist.   Eyes: Conjunctivae, EOM and lids are normal. Pupils are equal, round, and reactive to light.   Neck: Trachea normal, full passive range of motion without pain and phonation normal. Neck supple.   Cardiovascular: Normal rate, regular rhythm and normal heart sounds.    Pulmonary/Chest: Effort normal and breath sounds normal. No stridor. No respiratory distress.   Musculoskeletal: Normal range of motion.   Lymphadenopathy:        Head (left side): Preauricular adenopathy present.   Neurological: She is alert and oriented to person, place, and time.   Skin: Skin is warm and dry. Capillary refill takes less than 2 seconds. No " "abrasion, no bruising, no burn, no ecchymosis, no laceration, no lesion and no rash noted. There is erythema.        Psychiatric: She has a normal mood and affect. Her speech is normal and behavior is normal. Judgment and thought content normal. Cognition and memory are normal.   Nursing note and vitals reviewed.      Assessment:       1. Swelling of lymph node        Plan:         Swelling of lymph node    Other orders  -     azithromycin (Z-TAMEKA) 250 MG tablet; Take 2 tablets by mouth on day 1; Take 1 tablet by mouth on days 2-5  Dispense: 6 tablet; Refill: 0      Swollen left preauricular lymph node most likely due to localized healing abscess.    Will prescribe zpak at the pt request since "this is always" what they give her for these recurring problems.  Pt will has appt with her dermatologist tomorrow and pcp on Friday for f/u.      Lymphadenopathy  Lymphadenopathy is swelling of the lymph nodes. Lymph nodes are small, bean-shaped glands around the body.  What are lymph nodes?  Lymph nodes are part of your immune system. The glands are found in your neck, armpits, groin, chest, and abdomen. They act as filters for lymph fluid as it flows through your body. Lymph fluid contains white blood cells and other things that fight infection.  Why lymph nodes swell  Lymphadenopathy is very common. The glands often enlarge during a viral or bacterial infection. It can happen during a cold, the flu, or strep throat. The nodes may swell in just one area of the body, such as the neck (localized). Or nodes may swell all over the body (generalized). The neck (cervical) lymph nodes are the most common site of lymphadenopathy.  What causes lymphadenopathy?  Dead cells and fluid build up in the lymph nodes as they help fight infection or disease. This causes them to swell in size. Enlarged lymph nodes are often near the source of infection. This can help to find the cause of an infection. For example, swollen lymph nodes around " the jaw may be because of an infection in the teeth or mouth. But lymphadenopathy may also be generalized. This is common in some viral illnesses such as mononucleosis or chickenpox (varicella).  Lymphadenopathy can also be caused by:  · Infection of a lymph node or small group of nodes (lymphadenitis)  · Cancer  · Reactions to medicines such as antibiotics and some seizure medicines  · Other health conditions, such as lupus  Symptoms of lymphadenopathy  Lymphadenopathy can cause symptoms such as:  · Lumps under the jaw, on the sides or back of the neck, in the armpits, in the groin, or in the chest or belly (abdomen)  · Pain or tenderness in any of these areas  · Redness or warmth in any of these areas  You may also have symptoms from an infection causing the swollen glands. These symptoms may include fever, sore throat, body aches, or cough.  Diagnosing lymphadenopathy  Your health care provider will ask about your health history and symptoms. He or she will give you a physical exam and check the areas where lymph nodes are enlarged. Your health care provider will check the size and location of the nodes, and ask how long they have been swollen and if they are painful. Diagnostic tests and referral to specialists may be recommended. They may include:  · Blood tests. These are done to check for signs of infection and other problems.  · Urine test. This is also done to check for infection and other problems.  · Chest X-ray. This test can show enlarged lymph nodes or other problems.  · Lymph node biopsy. If lymph nodes are swollen for 3 to 4 weeks, they may be checked with a biopsy. Small samples of lymph node tissue are taken and checked in a lab for signs of cancer. You may be referred to a specialist in blood disorders and cancer (hematologist and oncologist).  Treatment for lymphadenopathy  The treatment of enlarged lymph nodes depends on the cause. Enlarged lymph nodes are often harmless and go away without any  treatment. Treatment is most often done on the cause of the enlarged nodes and may include:  · Antibiotic medicine to treat a bacterial infection  · Incision and drainage (I & D) of a lymph node for lymphadenitis  · Other medicines or procedures to treat the cause of the enlarged nodes  You may need follow-up exam in 3 to 4 weeks to recheck enlarged nodes.     When to call your health care provider  Call your health care provider if you have lymph nodes that are still swollen after 3 to 4 weeks.   Date Last Reviewed: 6/19/2015 © 2000-2017 [employer]. [address] [place] [id number]. All rights reserved. This information is not intended as a substitute for professional medical care. Always follow your healthcare professional's instructions.    Please return here or go to the Emergency Department for any concerns or worsening of condition.  If you were prescribed antibiotics, please take them to completion.  If you were prescribed a narcotic medication, do not drive or operate heavy equipment or machinery while taking these medications.  Please follow up with your primary care doctor or specialist as needed.    If you  smoke, please stop smoking.

## 2018-08-03 ENCOUNTER — OFFICE VISIT (OUTPATIENT)
Dept: FAMILY MEDICINE | Facility: CLINIC | Age: 48
End: 2018-08-03
Payer: MEDICAID

## 2018-08-03 VITALS
DIASTOLIC BLOOD PRESSURE: 70 MMHG | HEIGHT: 66 IN | SYSTOLIC BLOOD PRESSURE: 130 MMHG | TEMPERATURE: 98 F | WEIGHT: 233 LBS | OXYGEN SATURATION: 97 % | BODY MASS INDEX: 37.45 KG/M2 | HEART RATE: 86 BPM | RESPIRATION RATE: 17 BRPM

## 2018-08-03 DIAGNOSIS — M54.32 LEFT SIDED SCIATICA: ICD-10-CM

## 2018-08-03 DIAGNOSIS — J30.89 NON-SEASONAL ALLERGIC RHINITIS, UNSPECIFIED TRIGGER: Primary | ICD-10-CM

## 2018-08-03 DIAGNOSIS — Z98.84 S/P GASTRIC BYPASS: ICD-10-CM

## 2018-08-03 PROCEDURE — 99214 OFFICE O/P EST MOD 30 MIN: CPT | Mod: S$PBB,,, | Performed by: INTERNAL MEDICINE

## 2018-08-03 PROCEDURE — 99999 PR PBB SHADOW E&M-EST. PATIENT-LVL IV: CPT | Mod: PBBFAC,,, | Performed by: INTERNAL MEDICINE

## 2018-08-03 PROCEDURE — 99214 OFFICE O/P EST MOD 30 MIN: CPT | Mod: PBBFAC,PN | Performed by: INTERNAL MEDICINE

## 2018-08-03 RX ORDER — GABAPENTIN 300 MG/1
CAPSULE ORAL
Qty: 90 CAPSULE | Refills: 0 | Status: SHIPPED | OUTPATIENT
Start: 2018-08-03 | End: 2018-09-05 | Stop reason: DRUGHIGH

## 2018-08-03 RX ORDER — DIPHENHYD/PHENYLEPH/ACETAMINOP 25-650/30
650 LIQUID (ML) ORAL
Status: ON HOLD | COMMUNITY
End: 2021-02-20 | Stop reason: HOSPADM

## 2018-08-03 RX ORDER — MULTIVITAMIN
1 TABLET ORAL EVERY MORNING
Qty: 90 TABLET | Refills: 2 | Status: ON HOLD | OUTPATIENT
Start: 2018-08-03 | End: 2021-02-20 | Stop reason: HOSPADM

## 2018-08-03 RX ORDER — MULTIVIT WITH MINERALS/HERBS
1 TABLET ORAL DAILY
Qty: 90 TABLET | Refills: 2 | Status: ON HOLD | OUTPATIENT
Start: 2018-08-03 | End: 2021-02-20 | Stop reason: HOSPADM

## 2018-08-03 RX ORDER — AMOXICILLIN 500 MG
2 CAPSULE ORAL DAILY
Qty: 60 CAPSULE | Refills: 5 | Status: SHIPPED | OUTPATIENT
Start: 2018-08-03 | End: 2020-09-08

## 2018-08-03 RX ORDER — LORATADINE 10 MG/1
10 TABLET ORAL DAILY
Qty: 90 TABLET | Refills: 3 | Status: SHIPPED | OUTPATIENT
Start: 2018-08-03 | End: 2018-10-31 | Stop reason: SDUPTHER

## 2018-08-03 RX ORDER — PNV NO.95/FERROUS FUM/FOLIC AC 28MG-0.8MG
100 TABLET ORAL DAILY
Qty: 90 TABLET | Refills: 1 | Status: ON HOLD | OUTPATIENT
Start: 2018-08-03 | End: 2021-02-20 | Stop reason: HOSPADM

## 2018-08-03 NOTE — PROGRESS NOTES
"Subjective:       Patient ID: Audrey Natarajan is a 48 y.o. female.    Chief Complaint: Hypertension and Follow-up    F/u chronic conditions    HPI: 47 y/o w/ HTN, DM morbid obesity s/p gastric bypass in 2017 on daily supplements presents for follow up. She is requesting paper prescriptions for her vitamin supplements so that insurance will cover. She has also been using APAP for ankle and lower back pain with significant improvement. She felt gabapentin was causing her to "walk funny" and would like to decrease. She feels back pain is unchanged on her current dose       Review of Systems   Constitutional: Negative for activity change, appetite change, fatigue, fever and unexpected weight change.   HENT: Negative for ear pain, rhinorrhea and sore throat.    Eyes: Negative for discharge and visual disturbance.   Respiratory: Negative for chest tightness, shortness of breath and wheezing.    Cardiovascular: Negative for chest pain, palpitations and leg swelling.   Gastrointestinal: Negative for abdominal pain, constipation and diarrhea.   Endocrine: Negative for cold intolerance and heat intolerance.   Genitourinary: Negative for dysuria and hematuria.   Musculoskeletal: Positive for back pain. Negative for joint swelling and neck stiffness.   Skin: Negative for rash.   Neurological: Negative for dizziness, syncope, weakness and headaches.   Psychiatric/Behavioral: Negative for suicidal ideas.       Objective:     Vitals:    08/03/18 1100   BP: 130/70   BP Location: Right arm   Patient Position: Sitting   BP Method: Large (Manual)   Pulse: 86   Resp: 17   Temp: 98.2 °F (36.8 °C)   TempSrc: Oral   SpO2: 97%   Weight: 105.7 kg (233 lb 0.4 oz)   Height: 5' 6" (1.676 m)          Physical Exam   Constitutional: She is oriented to person, place, and time. She appears well-developed and well-nourished.   HENT:   Head: Normocephalic and atraumatic.   Eyes: Conjunctivae are normal. Pupils are equal, round, and reactive to " light.   Neck: Normal range of motion.   Cardiovascular: Normal rate and regular rhythm.  Exam reveals no gallop and no friction rub.    No murmur heard.  No LE edema   Pulmonary/Chest: Effort normal and breath sounds normal. She has no wheezes. She has no rales.   Abdominal: Soft. Bowel sounds are normal. There is no tenderness. There is no rebound and no guarding.   Musculoskeletal: Normal range of motion. She exhibits no edema or tenderness.   Neurological: She is alert and oriented to person, place, and time. No cranial nerve deficit.   Skin: Skin is warm and dry.   Psychiatric: She has a normal mood and affect.       Assessment and Plan   1. Left sided sciatica  Gabapentin wean reviewed with patient continue OTC tylenol not to exceed three grams in a day  - gabapentin (NEURONTIN) 300 MG capsule; Two tabs po (600mg) three times per day for one week, then one tab po TID x one week, then one tab po BID for one week  Dispense: 90 capsule; Refill: 0    2. Non-seasonal allergic rhinitis, unspecified trigger  Stable script for non sedating antihistamine  - loratadine (CLARITIN) 10 mg tablet; Take 1 tablet (10 mg total) by mouth once daily.  Dispense: 90 tablet; Refill: 3    3. S/P gastric bypass  conitnue vitamin supplementation she has follow up next month with her surgeon  - b complex vitamins tablet; Take 1 tablet by mouth once daily.  Dispense: 90 tablet; Refill: 2  - cyanocobalamin (VITAMIN B-12) 100 MCG tablet; Take 1 tablet (100 mcg total) by mouth once daily.  Dispense: 90 tablet; Refill: 1  - multivitamin (THERAGRAN) per tablet; Take 1 tablet by mouth every morning.  Dispense: 90 tablet; Refill: 2  - fish oil-omega-3 fatty acids 300-1,000 mg capsule; Take 2 capsules by mouth once daily. Instructed to stop 5-7 days before surgery (8/11/16).  Dispense: 60 capsule; Refill: 5

## 2018-08-03 NOTE — PATIENT INSTRUCTIONS
Gabapentin will decrease to 600mg three times per day for one week  Then 300mg three times per day for one week  Then 300mg twice per day for one week

## 2018-08-04 DIAGNOSIS — K21.9 GASTROESOPHAGEAL REFLUX DISEASE WITHOUT ESOPHAGITIS: ICD-10-CM

## 2018-08-06 RX ORDER — METFORMIN HYDROCHLORIDE 1000 MG/1
TABLET ORAL
Qty: 60 TABLET | Refills: 5 | Status: SHIPPED | OUTPATIENT
Start: 2018-08-06 | End: 2018-10-31 | Stop reason: SDUPTHER

## 2018-08-07 ENCOUNTER — CLINICAL SUPPORT (OUTPATIENT)
Dept: REHABILITATION | Facility: HOSPITAL | Age: 48
End: 2018-08-07
Attending: INTERNAL MEDICINE
Payer: MEDICAID

## 2018-08-07 DIAGNOSIS — G89.29 CHRONIC MIDLINE LOW BACK PAIN WITHOUT SCIATICA: ICD-10-CM

## 2018-08-07 DIAGNOSIS — M54.50 CHRONIC MIDLINE LOW BACK PAIN WITHOUT SCIATICA: ICD-10-CM

## 2018-08-07 DIAGNOSIS — M62.81 WEAKNESS OF TRUNK MUSCULATURE: ICD-10-CM

## 2018-08-07 DIAGNOSIS — M53.86 DECREASED RANGE OF MOTION OF INTERVERTEBRAL DISCS OF LUMBAR SPINE: ICD-10-CM

## 2018-08-07 PROCEDURE — 97161 PT EVAL LOW COMPLEX 20 MIN: CPT | Mod: PN

## 2018-08-07 PROCEDURE — 97110 THERAPEUTIC EXERCISES: CPT | Mod: PN
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
## 2018-08-08 ENCOUNTER — OFFICE VISIT (OUTPATIENT)
Dept: PODIATRY | Facility: CLINIC | Age: 48
End: 2018-08-08
Payer: MEDICAID

## 2018-08-08 VITALS
HEIGHT: 66 IN | SYSTOLIC BLOOD PRESSURE: 130 MMHG | DIASTOLIC BLOOD PRESSURE: 70 MMHG | BODY MASS INDEX: 37.45 KG/M2 | WEIGHT: 233 LBS

## 2018-08-08 DIAGNOSIS — M20.5X1 HALLUX LIMITUS, ACQUIRED, RIGHT: ICD-10-CM

## 2018-08-08 DIAGNOSIS — E11.49 TYPE II DIABETES MELLITUS WITH NEUROLOGICAL MANIFESTATIONS: ICD-10-CM

## 2018-08-08 DIAGNOSIS — J44.1 COPD EXACERBATION: ICD-10-CM

## 2018-08-08 DIAGNOSIS — M76.72 PERONEUS BREVIS TENDONITIS, LEFT: ICD-10-CM

## 2018-08-08 DIAGNOSIS — M20.41 HAMMER TOES OF BOTH FEET: ICD-10-CM

## 2018-08-08 DIAGNOSIS — M20.42 HAMMER TOES OF BOTH FEET: ICD-10-CM

## 2018-08-08 DIAGNOSIS — S93.402D INVERSION SPRAIN OF ANKLE, LEFT, SUBSEQUENT ENCOUNTER: Primary | ICD-10-CM

## 2018-08-08 DIAGNOSIS — M20.5X2 HALLUX LIMITUS, ACQUIRED, LEFT: ICD-10-CM

## 2018-08-08 PROCEDURE — 99213 OFFICE O/P EST LOW 20 MIN: CPT | Mod: PBBFAC,PO | Performed by: PODIATRIST

## 2018-08-08 PROCEDURE — 99999 PR PBB SHADOW E&M-EST. PATIENT-LVL III: CPT | Mod: PBBFAC,,, | Performed by: PODIATRIST

## 2018-08-08 PROCEDURE — 99213 OFFICE O/P EST LOW 20 MIN: CPT | Mod: S$PBB,,, | Performed by: PODIATRIST

## 2018-08-08 RX ORDER — MOMETASONE FUROATE AND FORMOTEROL FUMARATE DIHYDRATE 100; 5 UG/1; UG/1
AEROSOL RESPIRATORY (INHALATION)
Qty: 13 G | Refills: 0 | Status: SHIPPED | OUTPATIENT
Start: 2018-08-08 | End: 2018-09-05 | Stop reason: SDUPTHER

## 2018-08-08 NOTE — PROGRESS NOTES
rSubjective:      Patient ID: Audrey Natarajan is a 48 y.o. female.    Chief Complaint: Foot Pain (LEFT)    Audrey is a 48 y.o. female who RTC for follow up of left foot and ankle pain.  She relates she has been icing and elevating but admits she has not been wearing her CAM boot as instructed.  She presents today in clip flop type sandals  This patient has documented high risk feet requiring routine maintenance secondary to diabetes mellitis and those secondary complications of diabetes, as mentioned..      PCP: Donaldo Pena MD    Date Last Seen by PCP:   Chief Complaint   Patient presents with    Foot Pain     LEFT       Hemoglobin A1C   Date Value Ref Range Status   06/13/2018 7.2 (H) 4.0 - 5.6 % Final     Comment:     ADA Screening Guidelines:  5.7-6.4%  Consistent with prediabetes  >or=6.5%  Consistent with diabetes  High levels of fetal hemoglobin interfere with the HbA1C  assay. Heterozygous hemoglobin variants (HbS, HgC, etc)do  not significantly interfere with this assay.   However, presence of multiple variants may affect accuracy.     02/26/2018 6.1 (H) 4.0 - 5.6 % Final     Comment:     According to ADA guidelines, hemoglobin A1c <7.0% represents  optimal control in non-pregnant diabetic patients. Different  metrics may apply to specific patient populations.   Standards of Medical Care in Diabetes-2016.  For the purpose of screening for the presence of diabetes:  <5.7%     Consistent with the absence of diabetes  5.7-6.4%  Consistent with increasing risk for diabetes   (prediabetes)  >or=6.5%  Consistent with diabetes  Currently, no consensus exists for use of hemoglobin A1c  for diagnosis of diabetes for children.  This Hemoglobin A1c assay has significant interference with fetal   hemoglobin   (HbF). The results are invalid for patients with abnormal amounts of   HbF,   including those with known Hereditary Persistence   of Fetal Hemoglobin. Heterozygous hemoglobin variants (HbAS, HbAC,    HbAD, HbAE, HbA2) do not significantly interfere with this assay;   however, presence of multiple variants in a sample may impact the %   interference.     11/06/2017 6.1 (H) 4.0 - 5.6 % Final     Comment:     According to ADA guidelines, hemoglobin A1c <7.0% represents  optimal control in non-pregnant diabetic patients. Different  metrics may apply to specific patient populations.   Standards of Medical Care in Diabetes-2016.  For the purpose of screening for the presence of diabetes:  <5.7%     Consistent with the absence of diabetes  5.7-6.4%  Consistent with increasing risk for diabetes   (prediabetes)  >or=6.5%  Consistent with diabetes  Currently, no consensus exists for use of hemoglobin A1c  for diagnosis of diabetes for children.  This Hemoglobin A1c assay has significant interference with fetal   hemoglobin   (HbF). The results are invalid for patients with abnormal amounts of   HbF,   including those with known Hereditary Persistence   of Fetal Hemoglobin. Heterozygous hemoglobin variants (HbAS, HbAC,   HbAD, HbAE, HbA2) do not significantly interfere with this assay;   however, presence of multiple variants in a sample may impact the %   interference.             Patient Active Problem List   Diagnosis    Hypertension    COPD (chronic obstructive pulmonary disease)    Paroxysmal atrial fibrillation    Hypertriglyceridemia    Tobacco abuse    Mild protein malnutrition    Diabetic neuropathy    Leg swelling    Incisional hernia without mention of obstruction or gangrene    Type 2 diabetes mellitus without complication, without long-term current use of insulin    History of DVT (deep vein thrombosis)    History of pulmonary embolus (PE)    Bipolar 1 disorder    Gastroparesis due to DM    Chronic respiratory failure with hypoxia    Thrombocytosis    Leukocytosis    Morbid obesity    Type 2 diabetes mellitus    Acne    Chronic left-sided low back pain with sciatica    Chronic  left-sided low back pain without sciatica    Weakness of left lower extremity    Decreased range of motion of lumbar spine    Other chronic pain    Vomiting and diarrhea    Pneumonia of left lower lobe due to infectious organism    Chronic bilateral low back pain with left-sided sciatica    Chronic midline low back pain without sciatica    Weakness of trunk musculature    Decreased range of motion of intervertebral discs of lumbar spine       Current Outpatient Prescriptions on File Prior to Visit   Medication Sig Dispense Refill    acetaminophen (ARTHRITIS PAIN RELIEVER) 650 MG TbSR Take 650 mg by mouth. Pt states taking 2 -3 times a day      albuterol (VENTOLIN HFA) 90 mcg/actuation inhaler INHALE 2 PUFFS BY MOUTH INTO THE LUNGS EVERY 6 HOURS AS NEEDED FOR WHEEZING. RESCUE. 18 g 2    aspirin (ECOTRIN) 81 MG EC tablet Take 81 mg by mouth.      b complex vitamins tablet Take 1 tablet by mouth once daily. 90 tablet 2    blood sugar diagnostic Strp To check BG once daily, to use with insurance preferred meter 30 each 5    blood-glucose meter kit To check BG once daily, to use with insurance preferred meter 1 each 0    carbamazepine (TEGRETOL) 200 mg tablet TK 2 TS PO BID  1    clonazePAM (KLONOPIN) 1 MG tablet TK 1 T PO BID PRA AND INSOMNIA  1    cyanocobalamin (VITAMIN B-12) 100 MCG tablet Take 1 tablet (100 mcg total) by mouth once daily. 90 tablet 1    fish oil-omega-3 fatty acids 300-1,000 mg capsule Take 2 capsules by mouth once daily. Instructed to stop 5-7 days before surgery (8/11/16). 60 capsule 5    fluticasone (FLONASE ALLERGY RELIEF) 50 mcg/actuation nasal spray 1 spray (50 mcg total) by Each Nare route once daily. 16 g 1    furosemide (LASIX) 20 MG tablet TAKE 1 TABLET BY MOUTH EVERY DAY 30 tablet 2    gabapentin (NEURONTIN) 300 MG capsule Two tabs po (600mg) three times per day for one week, then one tab po TID x one week, then one tab po BID for one week 90 capsule 0    ibuprofen  (ADVIL,MOTRIN) 800 MG tablet Take 1 tablet (800 mg total) by mouth 3 (three) times daily. 90 tablet 1    lancets Misc To check BG once daily, to use with insurance preferred meter 30 each 2    lisinopril (PRINIVIL,ZESTRIL) 5 MG tablet TAKE 1 TABLET(5 MG) BY MOUTH EVERY DAY 30 tablet 2    loratadine (CLARITIN) 10 mg tablet Take 1 tablet (10 mg total) by mouth once daily. 90 tablet 3    meloxicam (MOBIC) 15 MG tablet   1    metFORMIN (GLUCOPHAGE) 1000 MG tablet TAKE 1 TABLET(1000 MG) BY MOUTH TWICE DAILY WITH MEALS 60 tablet 5    methocarbamol (ROBAXIN) 500 MG Tab Take 500 mg by mouth 4 (four) times daily.      metoprolol tartrate (LOPRESSOR) 25 MG tablet Take 1 tablet (25 mg total) by mouth once daily. 30 tablet 5    multivitamin (THERAGRAN) per tablet Take 1 tablet by mouth every morning. 90 tablet 2    nicotine (NICODERM CQ) 21 mg/24 hr Place 1 patch onto the skin once daily. 28 patch 0    nicotine polacrilex 2 MG Lozg 1-2 per hour in place of cigarettes maximum of 20 per day. 168 lozenge 0    omeprazole 20 mg TbEC TAKE 2 TABLETS BY MOUTH ONCE DAILY AT 6AM (Patient taking differently: one tablet twice daily) 90 each 1    pravastatin (PRAVACHOL) 40 MG tablet Take 1 tablet (40 mg total) by mouth once daily. 90 tablet 3    risperidone (RISPERDAL) 3 MG Tab take at bedtime  1    VYVANSE 40 mg Cap TK ONE C PO QAM  0    [DISCONTINUED] DULERA 100-5 mcg/actuation HFAA INHALE 2 PUFFS BY MOUTH TWICE DAILY 13 g 0     No current facility-administered medications on file prior to visit.        Review of patient's allergies indicates:   Allergen Reactions    Morphine Other (See Comments)     Patient had a psychotic episode after taking Morphine  Agitation, hallucinations    Penicillins Anaphylaxis     itching       Past Surgical History:   Procedure Laterality Date    ABDOMINAL SURGERY      BILATERAL OOPHORECTOMY Bilateral 1/12/2015    Green' s filter Right 7/4/2012    Right Neck & Tunneled Down.    HERNIA  "REPAIR      "[place] [facility] around th [place].", pt. states    OVARIAN CYST REMOVAL  3/13/2014    [place] REMOVAL OF OVARY/TUBE(S)      SPLENECTOMY, TOTAL  2003    TONSILLECTOMY      as a child    TYMPANOSTOMY TUBE PLACEMENT  1976    VEIN SURGERY      Lt leg       Family History   Problem Relation Age of Onset    Hypertension Father     Diabetes Father     Heart disease Father     Cataracts Father     Diabetes Paternal Grandfather     Heart disease Paternal Grandfather     No Known Problems Mother     Ovarian cancer Maternal Grandmother          from this. ? age     No Known Problems Sister     No Known Problems Brother     No Known Problems Maternal Aunt     No Known Problems Maternal Uncle     No Known Problems Paternal Aunt     No Known Problems Paternal Uncle     No Known Problems Maternal Grandfather     Ovarian cancer Paternal Grandmother     Uterine cancer Neg Hx     Breast cancer Neg Hx     Colon cancer Neg Hx     Amblyopia Neg Hx     Blindness Neg Hx     Cancer Neg Hx     Glaucoma Neg Hx     Macular degeneration Neg Hx     Retinal detachment Neg Hx     Strabismus Neg Hx     Stroke Neg Hx     Thyroid disease Neg Hx        Social History     Social History    Marital status:      Spouse name: N/A    Number of children: N/A    Years of education: N/A     Occupational History    Not on file.     Social History Main Topics    Smoking status: Current Every Day Smoker     Packs/day: 0.50     Years: 25.00     Types: Cigarettes    Smokeless tobacco: Never Used    Alcohol use No    Drug use: No    Sexual activity: Not Currently     Other Topics Concern    Not on file     Social History Narrative    No narrative on file       Review of Systems   Constitution: Negative for chills, fever and weakness.   Cardiovascular: Positive for leg swelling. Negative for claudication.   Respiratory: Negative for cough and shortness of breath.    Skin: " "Positive for dry skin and nail changes. Negative for itching and rash.   Musculoskeletal: Positive for arthritis, back pain, falls (after last encounter fractured toe of left foot), joint pain and myalgias. Negative for joint swelling and muscle weakness.   Gastrointestinal: Negative for diarrhea, nausea and vomiting.   Neurological: Positive for numbness and paresthesias. Negative for tremors.   Psychiatric/Behavioral: Negative for altered mental status and hallucinations.           Objective:      Vitals:    08/08/18 1402   BP: 130/70   Weight: 105.7 kg (233 lb 0.4 oz)   Height: 5' 6" (1.676 m)   PainSc:   6   PainLoc: Foot       Physical Exam   Constitutional:  Non-toxic appearance. She does not have a sickly appearance. No distress.   Cardiovascular:   Pulses:       Dorsalis pedis pulses are 2+ on the right side, and 2+ on the left side.        Posterior tibial pulses are 2+ on the right side, and 2+ on the left side.   Pulmonary/Chest: No respiratory distress.   Musculoskeletal:        Right ankle: She exhibits decreased range of motion and swelling. No tenderness. No lateral malleolus, no medial malleolus, no AITFL, no CF ligament and no posterior TFL tenderness found. Achilles tendon exhibits no pain, no defect and normal Paniagua's test results.        Left ankle: She exhibits decreased range of motion and swelling. Tenderness. Lateral malleolus, AITFL and CF ligament tenderness found. No medial malleolus, no posterior TFL and no proximal fibula tenderness found. Achilles tendon exhibits no pain, no defect and normal Paniagua's test results.        Right foot: There is no bony tenderness.        Left foot: There is tenderness (Pain on palpation to styloid process region base of 5th metatarsal base and pain with eversion) and swelling.   Biomechanical exam: There is equinus deformity bilateral with decreased dorsiflexion at the ankle joint bilateral. No tenderness with compression of heel. Negative tinels " sign. Gait analysis reveals excessive pronation through midstance and propulsion with early heel off. Shoes reveals lateral heel counter wear bilateral     Decreased first MPJ range of motion both weightbearing and nonweightbearing, no crepitus observed the first MP joint, + dorsal flag sign. Mild  bunion deformity is observed .    Patient has hammertoes of digits 2-5 bilateral partially reducible without symptom today.     Neurological: She has normal reflexes. She displays no atrophy and no tremor. No sensory deficit. She exhibits normal muscle tone.   Paresthesias, and hyperesthesia bilateral feet at toes with no clearly identified trigger or source.    [place] 5.07 monofilament is intact bilateral feet. Sharp/dull sensation is also intact Bilateral feet.   Skin: Skin is warm, dry and intact. No bruising, no burn, no laceration, no lesion and no rash noted. She is not diaphoretic. No cyanosis. No pallor. Nails show no clubbing.   Examination of the skin reveals no evidence of significant rashes, open lesions, suspicious appearing nevi or other concerning lesions.      Psychiatric: Her mood appears not anxious. Her affect is not inappropriate. Her speech is not slurred. She is not combative. She is communicative. She is attentive.   Nursing note reviewed.            Assessment:       Encounter Diagnoses   Name Primary?    Inversion sprain of ankle, left, subsequent encounter Yes    Peroneus brevis tendonitis, left     Hammer toes of both feet     Hallux limitus, acquired, left     Hallux limitus, acquired, right     Type II diabetes mellitus with neurological manifestations          Plan:       Audrey was seen today for foot pain.    Diagnoses and all orders for this visit:    Inversion sprain of ankle, left, subsequent encounter    Peroneus brevis tendonitis, left    Hammer toes of both feet    Hallux limitus, acquired, left    Hallux limitus, acquired, right    Type II diabetes mellitus with  neurological manifestations      I counseled the patient on her conditions, their implications and medical management.      Greater than 50% of this visit spent on counseling and coordination of care.    Education about the diabetic foot, neuropathy, and prevention of limb loss.    Shoe inspection. Diabetic Foot Education. Patient reminded of the importance of good nutrition/healthy diet/weight management and blood sugar control to help prevent podiatric complications of diabetes. Patient instructed on proper foot hygeine. Wear comfortable, proper fitting shoes. Wash feet daily. Dry well. After drying, apply moisturizer to feet (no lotion to webspaces). Inspect feet daily for skin breaks, blisters, swelling, or redness. Wear cotton socks (preferably white)  Change socks every day. Do NOT walk barefoot. Do NOT use heating pads or hot water soaks. We discussed wearing proper shoe gear, daily foot inspections, never walking without protective shoe gear.     Discussed ankle sprains and tendonitis and importance of immobilization for healing as well as the lengthy course of treatment for complete resolution.    1. CAM boot for immobilization. Transition into brace and supportive shoes.  ACE wrap applied.   2. Patient instructed to rest affected limb, avoid excessive WB and use crutch or walker assistance if needed.  3. Instructions on elevation to reduce pain and swelling. When sleeping, place a pillow under the injured leg. When sitting, support the injured leg so it is level with your waist.   4. Patient instructed on adequate icing techniques. Patient should ice the affected area at least once per day x 10 minutes for 10 days . I advised the  patient that extra icing would also be beneficial to ensure adequate anti inflammatory effect   5. Patient already on NSAIDs    She will continue to monitor the areas daily, inspect his feet, wear protective shoe gear when ambulatory, moisturizer to maintain skin integrity and  follow in this office in approximately 2 months, sooner p.r.n.

## 2018-08-15 ENCOUNTER — TELEPHONE (OUTPATIENT)
Dept: SMOKING CESSATION | Facility: CLINIC | Age: 48
End: 2018-08-15

## 2018-08-15 NOTE — TELEPHONE ENCOUNTER
[facility]- called to check on patient, no show for last appointment. Left message to call back to reschedule [phone] .

## 2018-08-16 ENCOUNTER — OFFICE VISIT (OUTPATIENT)
Dept: OPHTHALMOLOGY | Facility: CLINIC | Age: 48
End: 2018-08-16
Payer: MEDICAID

## 2018-08-16 DIAGNOSIS — H40.053 BILATERAL OCULAR HYPERTENSION: ICD-10-CM

## 2018-08-16 DIAGNOSIS — I10 ESSENTIAL HYPERTENSION: ICD-10-CM

## 2018-08-16 DIAGNOSIS — H52.7 REFRACTIVE ERROR: ICD-10-CM

## 2018-08-16 DIAGNOSIS — E11.9 DM TYPE 2 WITHOUT RETINOPATHY: ICD-10-CM

## 2018-08-16 DIAGNOSIS — H25.13 NUCLEAR SCLEROSIS OF BOTH EYES: Primary | ICD-10-CM

## 2018-08-16 PROCEDURE — 99212 OFFICE O/P EST SF 10 MIN: CPT | Mod: PBBFAC,PO,25 | Performed by: OPHTHALMOLOGY

## 2018-08-16 PROCEDURE — 76514 ECHO EXAM OF EYE THICKNESS: CPT | Mod: 26,S$PBB,, | Performed by: OPHTHALMOLOGY

## 2018-08-16 PROCEDURE — 99999 PR PBB SHADOW E&M-EST. PATIENT-LVL II: CPT | Mod: PBBFAC,,, | Performed by: OPHTHALMOLOGY

## 2018-08-16 PROCEDURE — 92014 COMPRE OPH EXAM EST PT 1/>: CPT | Mod: S$PBB,,, | Performed by: OPHTHALMOLOGY

## 2018-08-16 PROCEDURE — 76514 ECHO EXAM OF EYE THICKNESS: CPT | Mod: PBBFAC,PO | Performed by: OPHTHALMOLOGY

## 2018-08-16 NOTE — PROGRESS NOTES
Subjective:       Patient ID: Audrey Natarajan is a 48 y.o. female.    Chief Complaint: Cataract    HPI     48 y.o. Female is here for Cataract Eval per Dr. Welch. Denies eye pain and   flashes. Had floaters in the past. Dry Eyes bilateral. Do have allergies   really bad. Itching, burning and tearing bilateral. Blurred vision at   distance and near with correction. Slight trouble with glare.     Meds: Allergy Relief prn OU     Last edited by STEPHANIE Self on 8/16/2018  2:53 PM. (History)             Assessment:       1. Nuclear sclerosis of both eyes    2. Bilateral ocular hypertension    3. DM type 2 without retinopathy    4. Essential hypertension    5. Refractive error        Plan:       Cataracts- Not visually significant.  OHT OU-IOP's are WNL's OU when corrected for CCT's:  (-4), OS (-3). ON's appear healthy OU.  DM-No NPDR OU.  HTN-No retinopathy OU.  RE-Pt wants MRx.        Control DM & HTN.  RTC 9/27/18 for repeat MRx.

## 2018-08-16 NOTE — LETTER
August 16, 2018      Garima Welch, OD  [address]  [place] [address]           [facility]  [address] [facility] [address]  [place] [address]  Phone: [phone]  Fax: [phone]          Patient: Audrey Natarajan   MR Number: [id number]   YOB: 1970   Date of Visit: 8/16/2018       Dear Dr. Garima Welch:    Thank you for referring Audrey Natarajan to me for evaluation. Attached you will find relevant portions of my assessment and plan of care.    If you have questions, please do not hesitate to call me. I look forward to following Audrey Natarajan along with you.    Sincerely,    Hao Jones MD    Enclosure  CC:  No Recipients    If you would like to receive this communication electronically, please contact [email or web address] or [phone] to request more information on [employer] Link access.    For providers and/or their staff who would like to refer a patient to [facility], please contact us through our one-stop-shop provider referral line, [facility], at [phone].    If you feel you have received this communication in error or would no longer like to receive these types of communications, please e-mail [email or web address]

## 2018-08-20 ENCOUNTER — CLINICAL SUPPORT (OUTPATIENT)
Dept: SMOKING CESSATION | Facility: CLINIC | Age: 48
End: 2018-08-20
Payer: COMMERCIAL

## 2018-08-20 DIAGNOSIS — K21.9 GASTROESOPHAGEAL REFLUX DISEASE WITHOUT ESOPHAGITIS: ICD-10-CM

## 2018-08-20 DIAGNOSIS — F17.200 NICOTINE DEPENDENCE: ICD-10-CM

## 2018-08-20 PROCEDURE — 99403 PREV MED CNSL INDIV APPRX 45: CPT | Mod: S$GLB,,,

## 2018-08-20 PROCEDURE — 99999 PR PBB SHADOW E&M-EST. PATIENT-LVL I: CPT | Mod: PBBFAC,,,

## 2018-08-20 RX ORDER — DM/P-EPHED/ACETAMINOPH/DOXYLAM 30-7.5/3
LIQUID (ML) ORAL
Qty: 168 LOZENGE | Refills: 0 | Status: SHIPPED | OUTPATIENT
Start: 2018-08-20 | End: 2020-05-21 | Stop reason: SDUPTHER

## 2018-08-20 RX ORDER — IBUPROFEN 200 MG
1 TABLET ORAL DAILY
Qty: 28 PATCH | Refills: 0 | Status: SHIPPED | OUTPATIENT
Start: 2018-08-20 | End: 2018-09-17

## 2018-08-20 NOTE — Clinical Note
Pt continues to smoke 20 cigs/day. Pt has not been using her tobacco cessation medication of  21 mg nicotine patch QD and 2 mg nicotine lozenge  PRN (1-2 per hour in place of cigarettes.) N  Reviewed coping strategies/habitual behavior/relapse prevention with patient. Exhaled carbon monoxide level was 40 ppm per Smoker[employer]zer  ( non- smoker = 0-5 ppm .) Pt is going out of town for a few weeks and she said she will start using her medications today.  Reminded patient that her benefits will run out for this year on 10/22/18 so it would be best for her to quit as soon as possible. Will see pt back in office in 4 weeks

## 2018-08-20 NOTE — PROGRESS NOTES
Individual Follow-Up Form    8/20/2018    Quit Date:     Clinical Status of Patient: Outpatient    Length of Service: 45 minutes    Continuing Medication: no    Other Medications:      Target Symptoms: Withdrawal and medication side effects. The following were  rated moderate (3) to severe (4) on TCRS:  · Moderate (3): none   · Severe (4): none    Comments: Pt continues to smoke 20 cigs/day. Pt has not been using her tobacco cessation medication of  21 mg nicotine patch QD and 2 mg nicotine lozenge PRN (1-2 per hour in place of cigarettes.) N  Reviewed coping strategies/habitual behavior/relapse prevention with patient. Exhaled carbon monoxide level was 40 ppm per Smokerlyzer  ( non- smoker = 0-5 ppm .) Pt is going out of town for a few weeks and she said she will start using her medications today.  Reminded patient that her benefits will run out for this year on 10/22/18 so it would be best for her to quit as soon as possible. Will see pt back in office in 4 weeks      Diagnosis: F17.200    Next Visit: 2 weeks

## 2018-08-21 ENCOUNTER — CLINICAL SUPPORT (OUTPATIENT)
Dept: REHABILITATION | Facility: HOSPITAL | Age: 48
End: 2018-08-21
Attending: INTERNAL MEDICINE
Payer: MEDICAID

## 2018-08-21 DIAGNOSIS — M54.50 CHRONIC MIDLINE LOW BACK PAIN WITHOUT SCIATICA: ICD-10-CM

## 2018-08-21 DIAGNOSIS — G89.29 CHRONIC MIDLINE LOW BACK PAIN WITHOUT SCIATICA: ICD-10-CM

## 2018-08-21 DIAGNOSIS — M53.86 DECREASED RANGE OF MOTION OF INTERVERTEBRAL DISCS OF LUMBAR SPINE: ICD-10-CM

## 2018-08-21 DIAGNOSIS — M62.81 WEAKNESS OF TRUNK MUSCULATURE: ICD-10-CM

## 2018-08-21 PROCEDURE — 97110 THERAPEUTIC EXERCISES: CPT | Mod: PN

## 2018-08-21 RX ORDER — PHENOL/SODIUM PHENOLATE
AEROSOL, SPRAY (ML) MUCOUS MEMBRANE
Qty: 60 EACH | Refills: 1 | Status: SHIPPED | OUTPATIENT
Start: 2018-08-21 | End: 2018-10-31 | Stop reason: SDUPTHER

## 2018-08-21 RX ORDER — METHOCARBAMOL 500 MG/1
TABLET, FILM COATED ORAL
Qty: 100 TABLET | Refills: 0 | Status: SHIPPED | OUTPATIENT
Start: 2018-08-21 | End: 2018-10-23 | Stop reason: SDUPTHER

## 2018-08-21 NOTE — PROGRESS NOTES
Physical Therapy Daily Note     Name: Audrey Natarajan  Clinic Number: [id number]  Diagnosis:   Encounter Diagnoses   Name Primary?    Chronic midline low back pain without sciatica     Weakness of trunk musculature     Decreased range of motion of intervertebral discs of lumbar spine      Physician: Donaldo Pena MD  Precautions: Seizures, COPD, A-fib, Bipolar 1 disorder, HTN, pulmonary embolism, DVT  Visit #: 1 of 3 (new referral; total visits 2) GONZALEZ: 10/5/2018  PTA Visit #: 1  Time In: 1100  Time Out: 1205  Total Treatment Time: 55 mins (1:1 with PTA for 40 mins)    Subjective     Patient reports: that she fell again on Saturday while walking into the cigarette store. Patient states that she busted up her Right knee and hand, but that her pain feels more like a bruise. Patient denies seeking medical assistance following fall. Patient states that she is going out of town Friday night for a month to help her sister out of state. Patient reports that she cannot afford the gym because it is $60 a month, but Is compliant with her HEP 2x/day.   Pain Scale: Audrey rates pain on a scale of 0-10 to be 5 currently, low back.    Objective     Audrey received individual therapeutic exercises to develop strength, endurance, ROM, flexibility, posture and core stabilization for 55 minutes including:    Warm-up: Upright Bike x 7 minutes - Level 1     +Matrix leg extension: 15# x 2 x 15  +Matrix leg curl: 25# x 2 x 15  +Matrix hip abduction: 20# x 2 x 15  +Matrix hip adduction: 20# x 2 x 15  +Matrix leg press: 40# x 2 x 15  +MedX core torso rotation: 20# x 2 x 10 ea.   +MedX core lumbar strength: 40# x 2 x 10 (ROM 0-60 degrees)    Not performed today: D/C to HEP  LTR: 20x   HL Bridges: 2 x 10  Pelvic tilts: 2 x 10 x 3-5 sec hold  Open books: 15 x ea.  SKTC: 5 x 10 sec hold ea.  SLR: 2 x 10 ea.  HL Hip Adduction ball squeeze: 3 minutes x 3 sec hold  HL Hip Abduction: Red  TB x 20    Audrey received the following manual therapy techniques: none  The patient received the following direct contact modalities after being cleared for contraindications: none    The patient received the following supervised modalities after being cleared for contradictions: cold pack to Left low back/hip x 10 minutes post treatment session.    Written Home Exercises Provided: Reviewed from initial evaluation. See EMR for printout provided.  Pt demo good understanding of the education provided. Audrey demonstrated good return demonstration of activities.     Education provided re: Importance of consistency with therapy to meet goals and improve overall functional mobility.   Audrey verbalized good understanding of education provided.   No spiritual or educational barriers to learning provided    Assessment     Patient tolerated treatment session well today. Good tolerance to introduction of LE peripheral strengthening machines reporting appropriate muscle fatigue at end of session. Patient demonstrates good recall of HEP requiring minimal verbal cueing for proper technique.   This is a 48 y.o. female referred to outpatient physical therapy and presents with a medical diagnosis of chronic lower back pain without sciatica and demonstrates limitations as described in the problem list. Pt prognosis is Fair+. Pt will continue to benefit from skilled outpatient physical therapy to address the deficits listed in the problem list, provide pt/family education and to maximize pt's level of independence in the home and community environment.     Goals as follows:  Short Term Goals:  2-3 weeks  1. Report decreased in pain at worse less than  <   / =  5/10  to increase tolerance for functional activities  2. Pt to increase B popliteal angle by greater than > or = 5 degrees in order to improve flexibility and posture.   3. Increased left LE MMT 1/2  to increase tolerance for ADL and work activities.  4. Pt to tolerate HEP  to improve ROM and independence with ADL's  5. Pt to improve lumbar range of motion by 15% to allow for improved functional mobility to allow for improvement in IADLs.      Plan     Certification Period: 8/7/2018 to 11/7/2018 (PN due by 9/7/2018)    Continue with established Plan of Care towards PT goals.    Therapist: Kelli Prakash, PTA  8/21/2018

## 2018-08-23 ENCOUNTER — DOCUMENTATION ONLY (OUTPATIENT)
Dept: REHABILITATION | Facility: HOSPITAL | Age: 48
End: 2018-08-23

## 2018-08-23 NOTE — PROGRESS NOTES
"Physical Therapy: No show/Cancellation of Visit  Date: 08/23/2018    Patient cancelled today's PT appointment. Reason for cancellation: "not feeling well." Patient's next scheduled appointment is 9/24/2018.     Cancel: 1  No show: 0    Therapist: Kelli Prakash PTA        "

## 2018-09-05 DIAGNOSIS — J44.1 COPD EXACERBATION: ICD-10-CM

## 2018-09-05 DIAGNOSIS — M54.32 LEFT SIDED SCIATICA: ICD-10-CM

## 2018-09-05 RX ORDER — GABAPENTIN 600 MG/1
TABLET ORAL
Qty: 180 TABLET | Refills: 0 | Status: SHIPPED | OUTPATIENT
Start: 2018-09-05 | End: 2018-10-10 | Stop reason: SDUPTHER

## 2018-09-05 RX ORDER — MOMETASONE FUROATE AND FORMOTEROL FUMARATE DIHYDRATE 100; 5 UG/1; UG/1
AEROSOL RESPIRATORY (INHALATION)
Qty: 13 G | Refills: 0 | Status: SHIPPED | OUTPATIENT
Start: 2018-09-05 | End: 2018-10-10 | Stop reason: SDUPTHER

## 2018-09-10 RX ORDER — IBUPROFEN 800 MG/1
TABLET ORAL
Qty: 90 TABLET | Refills: 0 | OUTPATIENT
Start: 2018-09-10

## 2018-09-24 ENCOUNTER — DOCUMENTATION ONLY (OUTPATIENT)
Dept: REHABILITATION | Facility: HOSPITAL | Age: 48
End: 2018-09-24

## 2018-09-25 ENCOUNTER — OFFICE VISIT (OUTPATIENT)
Dept: PODIATRY | Facility: CLINIC | Age: 48
End: 2018-09-25
Payer: MEDICAID

## 2018-09-25 VITALS
SYSTOLIC BLOOD PRESSURE: 105 MMHG | WEIGHT: 233 LBS | HEIGHT: 66 IN | DIASTOLIC BLOOD PRESSURE: 55 MMHG | BODY MASS INDEX: 37.45 KG/M2 | HEART RATE: 105 BPM

## 2018-09-25 DIAGNOSIS — M20.42 HAMMER TOES OF BOTH FEET: ICD-10-CM

## 2018-09-25 DIAGNOSIS — M20.41 HAMMER TOES OF BOTH FEET: ICD-10-CM

## 2018-09-25 DIAGNOSIS — E11.49 TYPE II DIABETES MELLITUS WITH NEUROLOGICAL MANIFESTATIONS: ICD-10-CM

## 2018-09-25 DIAGNOSIS — M20.5X2 HALLUX LIMITUS, ACQUIRED, LEFT: ICD-10-CM

## 2018-09-25 DIAGNOSIS — M20.5X1 HALLUX LIMITUS, ACQUIRED, RIGHT: ICD-10-CM

## 2018-09-25 DIAGNOSIS — S93.402D INVERSION SPRAIN OF ANKLE, LEFT, SUBSEQUENT ENCOUNTER: Primary | ICD-10-CM

## 2018-09-25 DIAGNOSIS — M76.72 PERONEUS BREVIS TENDONITIS, LEFT: ICD-10-CM

## 2018-09-25 PROCEDURE — 99999 PR PBB SHADOW E&M-EST. PATIENT-LVL III: CPT | Mod: PBBFAC,,, | Performed by: PODIATRIST

## 2018-09-25 PROCEDURE — 99213 OFFICE O/P EST LOW 20 MIN: CPT | Mod: S$PBB,,, | Performed by: PODIATRIST

## 2018-09-25 PROCEDURE — 99213 OFFICE O/P EST LOW 20 MIN: CPT | Mod: PBBFAC,PO | Performed by: PODIATRIST

## 2018-09-25 NOTE — PROGRESS NOTES
rSubjective:      Patient ID: Audrey Natarajan is a 48 y.o. female.    Chief Complaint: Foot Pain (left foot  PCp Dr. Pena  08/03/2018 )    Audrey is a 48 y.o. female who RTC for follow up of left foot and ankle pain.  She relates she has been icing and elevating and wearing her CAM boot as instructed.  She presents today in CAM boot with plastic on the skin.  She relates 50-60% improvement  This patient has documented high risk feet requiring routine maintenance secondary to diabetes mellitis and those secondary complications of diabetes, as mentioned..      PCP: Donaldo Pena MD    Date Last Seen by PCP:   Chief Complaint   Patient presents with    Foot Pain     left foot  PCp Dr. Pena  08/03/2018        Hemoglobin A1C   Date Value Ref Range Status   06/13/2018 7.2 (H) 4.0 - 5.6 % Final     Comment:     ADA Screening Guidelines:  5.7-6.4%  Consistent with prediabetes  >or=6.5%  Consistent with diabetes  High levels of fetal hemoglobin interfere with the HbA1C  assay. Heterozygous hemoglobin variants (HbS, HgC, etc)do  not significantly interfere with this assay.   However, presence of multiple variants may affect accuracy.     02/26/2018 6.1 (H) 4.0 - 5.6 % Final     Comment:     According to ADA guidelines, hemoglobin A1c <7.0% represents  optimal control in non-pregnant diabetic patients. Different  metrics may apply to specific patient populations.   Standards of Medical Care in Diabetes-2016.  For the purpose of screening for the presence of diabetes:  <5.7%     Consistent with the absence of diabetes  5.7-6.4%  Consistent with increasing risk for diabetes   (prediabetes)  >or=6.5%  Consistent with diabetes  Currently, no consensus exists for use of hemoglobin A1c  for diagnosis of diabetes for children.  This Hemoglobin A1c assay has significant interference with fetal   hemoglobin   (HbF). The results are invalid for patients with abnormal amounts of   HbF,   including those with known Hereditary  Persistence   of Fetal Hemoglobin. Heterozygous hemoglobin variants (HbAS, HbAC,   HbAD, HbAE, HbA2) do not significantly interfere with this assay;   however, presence of multiple variants in a sample may impact the %   interference.     11/06/2017 6.1 (H) 4.0 - 5.6 % Final     Comment:     According to ADA guidelines, hemoglobin A1c <7.0% represents  optimal control in non-pregnant diabetic patients. Different  metrics may apply to specific patient populations.   Standards of Medical Care in Diabetes-2016.  For the purpose of screening for the presence of diabetes:  <5.7%     Consistent with the absence of diabetes  5.7-6.4%  Consistent with increasing risk for diabetes   (prediabetes)  >or=6.5%  Consistent with diabetes  Currently, no consensus exists for use of hemoglobin A1c  for diagnosis of diabetes for children.  This Hemoglobin A1c assay has significant interference with fetal   hemoglobin   (HbF). The results are invalid for patients with abnormal amounts of   HbF,   including those with known Hereditary Persistence   of Fetal Hemoglobin. Heterozygous hemoglobin variants (HbAS, HbAC,   HbAD, HbAE, HbA2) do not significantly interfere with this assay;   however, presence of multiple variants in a sample may impact the %   interference.             Patient Active Problem List   Diagnosis    Essential hypertension    COPD (chronic obstructive pulmonary disease)    Paroxysmal atrial fibrillation    Hypertriglyceridemia    Tobacco abuse    Mild protein malnutrition    Diabetic neuropathy    Leg swelling    Incisional hernia without mention of obstruction or gangrene    DM type 2 without retinopathy    History of DVT (deep vein thrombosis)    History of pulmonary embolus (PE)    Bipolar 1 disorder    Gastroparesis due to DM    Chronic respiratory failure with hypoxia    Thrombocytosis    Leukocytosis    Morbid obesity    Type 2 diabetes mellitus    Acne    Chronic left-sided low back pain  with sciatica    Chronic left-sided low back pain without sciatica    Weakness of left lower extremity    Decreased range of motion of lumbar spine    Other chronic pain    Vomiting and diarrhea    Pneumonia of left lower lobe due to infectious organism    Chronic bilateral low back pain with left-sided sciatica    Chronic midline low back pain without sciatica    Weakness of trunk musculature    Decreased range of motion of intervertebral discs of lumbar spine    Nuclear sclerosis of both eyes    Bilateral ocular hypertension    Refractive error       Current Outpatient Medications on File Prior to Visit   Medication Sig Dispense Refill    acetaminophen (ARTHRITIS PAIN RELIEVER) 650 MG TbSR Take 650 mg by mouth. Pt states taking 2 -3 times a day      albuterol (VENTOLIN HFA) 90 mcg/actuation inhaler INHALE 2 PUFFS BY MOUTH INTO THE LUNGS EVERY 6 HOURS AS NEEDED FOR WHEEZING. RESCUE. 18 g 2    aspirin (ECOTRIN) 81 MG EC tablet Take 81 mg by mouth once daily.       b complex vitamins tablet Take 1 tablet by mouth once daily. 90 tablet 2    blood sugar diagnostic Strp To check BG once daily, to use with insurance preferred meter 30 each 5    blood-glucose meter kit To check BG once daily, to use with insurance preferred meter 1 each 0    carbamazepine (TEGRETOL) 200 mg tablet TK 2 TS PO BID  1    clonazePAM (KLONOPIN) 1 MG tablet TK 1 T PO 0.50mg BID PRA AND INSOMNIA  1    cyanocobalamin (VITAMIN B-12) 100 MCG tablet Take 1 tablet (100 mcg total) by mouth once daily. 90 tablet 1    DULERA 100-5 mcg/actuation HFAA INHALE 2 PUFFS BY MOUTH TWICE DAILY 13 g 0    fish oil-omega-3 fatty acids 300-1,000 mg capsule Take 2 capsules by mouth once daily. Instructed to stop 5-7 days before surgery (8/11/16). 60 capsule 5    fluticasone (FLONASE ALLERGY RELIEF) 50 mcg/actuation nasal spray 1 spray (50 mcg total) by Each Nare route once daily. 16 g 1    furosemide (LASIX) 20 MG tablet TAKE 1 TABLET BY  MOUTH EVERY DAY 30 tablet 2    gabapentin (NEURONTIN) 600 MG tablet TAKE 2 TABLETS(1200 MG) BY MOUTH THREE TIMES DAILY 180 tablet 0    ibuprofen (ADVIL,MOTRIN) 800 MG tablet Take 1 tablet (800 mg total) by mouth 3 (three) times daily. 90 tablet 1    lancets Misc To check BG once daily, to use with insurance preferred meter 30 each 2    lisinopril (PRINIVIL,ZESTRIL) 5 MG tablet TAKE 1 TABLET(5 MG) BY MOUTH EVERY DAY 30 tablet 2    loratadine (CLARITIN) 10 mg tablet Take 1 tablet (10 mg total) by mouth once daily. 90 tablet 3    meloxicam (MOBIC) 15 MG tablet nightly as needed.   1    metFORMIN (GLUCOPHAGE) 1000 MG tablet TAKE 1 TABLET(1000 MG) BY MOUTH TWICE DAILY WITH MEALS 60 tablet 5    methocarbamol (ROBAXIN) 500 MG Tab TAKE 1 TABLET(500 MG) BY MOUTH EVERY 6 HOURS AS NEEDED 100 tablet 0    metoprolol tartrate (LOPRESSOR) 25 MG tablet Take 1 tablet (25 mg total) by mouth once daily. 30 tablet 5    multivitamin (THERAGRAN) per tablet Take 1 tablet by mouth every morning. 90 tablet 2    nicotine polacrilex 2 MG Lozg 1-2 per hour in place of cigarettes maximum of 20 per day. 168 lozenge 0    omeprazole 20 mg TbEC TAKE 2 TABLETS BY MOUTH ONCE DAILY AT 6AM 60 each 1    pravastatin (PRAVACHOL) 40 MG tablet Take 1 tablet (40 mg total) by mouth once daily. 90 tablet 3    risperidone (RISPERDAL) 3 MG Tab take at bedtime  1    VYVANSE 40 mg Cap TK ONE C PO QAM  0     No current facility-administered medications on file prior to visit.        Review of patient's allergies indicates:   Allergen Reactions    Morphine Other (See Comments)     Patient had a psychotic episode after taking Morphine  Agitation, hallucinations    Penicillins Anaphylaxis     itching       Past Surgical History:   Procedure Laterality Date    ABDOMINAL SURGERY      BILATERAL OOPHORECTOMY Bilateral 1/12/2015    BIOPSY, BLADDER  9/18/2013    [facility]    CYSTOSCOPY N/A 9/18/2013    Performed by  "[facility]oh[facility] Link [facility] at [facility] OR    ESOPHAGOGASTRODUODENOSCOPY (EGD) N/A 8/10/2016    Per[facility] by [facility] at [facility] OR    ESOPHAGOGASTRODUODENOSCOPY (EGD) N/A 10/8/2014    Performed by Jarocho Fowler MD at [facility] ENDO    FULGURATION, BLADDER  2013    Per[facility] at [facility] OR    GASTRECTOMY-SLEEVE-LAPAROSCOPIC N/A 8/10/2016    [facility] at [facility] OR    Green' s filter Right 2012    Right Neck & Tunneled Down.    HERNIA REPAIR      "[place] of Hernias Repaires around th Belly Button.", pt. states    IRRIGATION, BLADDER N/A 2013    Per[facility] by [facility] at [facility] OR    LAPAROSCOPY W/REMOVAL OF INFECTED MESH  2015    [facility] at [facility] OR    LAPAROTOMY-EXPLORATORY Bilateral 2015    [facility] at [facility] OR 2ND FLR    OVARIAN CYST REMOVAL  3/13/2014    [place] REMOVAL OF OVARY/TUBE(S)      REPAIR, HERNIA, VENTRAL, LAPAROSCOPIC N/A 3/14/2014    [facility] at [facility] OR    [id number]-[id number]-KQTUVUKIM (BSO) Bilateral 2015    Performed by [facility] at [facility] OR 2ND FLR    SPLENECTOMY, TOTAL  2003    TONSILLECTOMY      as a child    TYMPANOSTOMY TUBE PLACEMENT      VEIN SURGERY      Lt leg       Family History   Problem Relation Age of Onset    Hypertension Father     Diabetes Father     Heart disease Father     Cataracts Father     Diabetes Paternal Grandfather     Heart disease Paternal Grandfather     No Known Problems Mother     Ovarian cancer Maternal Grandmother          from this. ? age     No Known Problems Sister     No Known Problems Brother     No Known Problems Maternal Aunt     No Known Problems Maternal Uncle     No Known Problems Paternal Aunt     No Known Problems Paternal Uncle     No Known Problems Maternal Grandfather     Ovarian cancer Paternal Grandmother     Uterine cancer Neg Hx     Breast " "cancer Neg Hx     Colon cancer Neg Hx     Amblyopia Neg Hx     Blindness Neg Hx     Cancer Neg Hx     Glaucoma Neg Hx     Macular degeneration Neg Hx     Retinal detachment Neg Hx     Strabismus Neg Hx     Stroke Neg Hx     Thyroid disease Neg Hx        Social History     Socioeconomic History    Marital status:      Spouse name: Not on file    Number of children: Not on file    Years of education: Not on file    Highest education level: Not on file   Social Needs    Financial resource strain: Not on file    Food insecurity - worry: Not on file    Food insecurity - inability: Not on file    Transportation needs - medical: Not on file    Transportation needs - non-medical: Not on file   Occupational History    Not on file   Tobacco Use    Smoking status: Current Every Day Smoker     Packs/day: 0.50     Years: 25.00     Pack years: 12.50     Types: Cigarettes    Smokeless tobacco: Never Used   Substance and Sexual Activity    Alcohol use: No     Alcohol/week: 0.0 oz    Drug use: No    Sexual activity: Not Currently   Other Topics Concern    Not on file   Social History Narrative    Not on file       Review of Systems   Constitution: Negative for chills, fever and weakness.   Cardiovascular: Positive for leg swelling. Negative for claudication.   Respiratory: Negative for cough and shortness of breath.    Skin: Positive for dry skin and nail changes. Negative for itching and rash.   Musculoskeletal: Positive for arthritis, back pain, joint pain and myalgias. Negative for joint swelling and muscle weakness.   Gastrointestinal: Negative for diarrhea, nausea and vomiting.   Neurological: Positive for numbness and paresthesias. Negative for tremors.   Psychiatric/Behavioral: Negative for altered mental status and hallucinations.           Objective:      Vitals:    09/25/18 1421   BP: (!) 105/55   Pulse: 105   Weight: 105.7 kg (233 lb)   Height: 5' 6" (1.676 m)   PainSc:   4   PainLoc: " Foot       Physical Exam   Constitutional:  Non-toxic appearance. She does not have a sickly appearance. No distress.   Cardiovascular:   Pulses:       Dorsalis pedis pulses are 2+ on the right side, and 2+ on the left side.        Posterior tibial pulses are 2+ on the right side, and 2+ on the left side.   Pulmonary/Chest: No respiratory distress.   Musculoskeletal:        Right ankle: She exhibits decreased range of motion and swelling. No tenderness. No lateral malleolus, no medial malleolus, no AITFL, no CF ligament and no posterior TFL tenderness found. Achilles tendon exhibits no pain, no defect and normal Paniagua's test results.        Left ankle: She exhibits decreased range of motion and swelling. Tenderness. Lateral malleolus, AITFL and CF ligament tenderness found. No medial malleolus, no posterior TFL and no proximal fibula tenderness found. Achilles tendon exhibits no pain, no defect and normal Paniagua's test results.        Right foot: There is no bony tenderness.        Left foot: There is tenderness (Pain on palpation to styloid process region base of 5th metatarsal base and pain with eversion) and swelling.   Biomechanical exam: There is equinus deformity bilateral with decreased dorsiflexion at the ankle joint bilateral. No tenderness with compression of heel. Negative tinels sign. Gait analysis reveals excessive pronation through midstance and propulsion with early heel off. Shoes reveals lateral heel counter wear bilateral     Decreased first MPJ range of motion both weightbearing and nonweightbearing, no crepitus observed the first MP joint, + dorsal flag sign. Mild  bunion deformity is observed .    Patient has hammertoes of digits 2-5 bilateral partially reducible without symptom today.     Neurological: She has normal reflexes. She displays no atrophy and no tremor. No sensory deficit. She exhibits normal muscle tone.   Paresthesias, and hyperesthesia bilateral feet at toes with no clearly  identified trigger or source.    Loretto-Gilberto 5.07 monofilament is intact bilateral feet. Sharp/dull sensation is also intact Bilateral feet.   Skin: Skin is warm, dry and intact. No bruising, no burn, no laceration, no lesion and no rash noted. She is not diaphoretic. No cyanosis. No pallor. Nails show no clubbing.   Examination of the skin reveals no evidence of significant rashes, open lesions, suspicious appearing nevi or other concerning lesions.      Psychiatric: Her mood appears not anxious. Her affect is not inappropriate. Her speech is not slurred. She is not combative. She is communicative. She is attentive.   Nursing note reviewed.            Assessment:       Encounter Diagnoses   Name Primary?    Inversion sprain of ankle, left, subsequent encounter Yes    Peroneus brevis tendonitis, left     Hammer toes of both feet     Hallux limitus, acquired, left     Hallux limitus, acquired, right     Type II diabetes mellitus with neurological manifestations          Plan:       Audrey was seen today for foot pain.    Diagnoses and all orders for this visit:    Inversion sprain of ankle, left, subsequent encounter    Peroneus brevis tendonitis, left  -     DIABETIC SHOES FOR HOME USE    Hammer toes of both feet  -     DIABETIC SHOES FOR HOME USE    Hallux limitus, acquired, left  -     DIABETIC SHOES FOR HOME USE    Hallux limitus, acquired, right  -     DIABETIC SHOES FOR HOME USE    Type II diabetes mellitus with neurological manifestations  -     DIABETIC SHOES FOR HOME USE      I counseled the patient on her conditions, their implications and medical management.      Greater than 50% of this visit spent on counseling and coordination of care.    Education about the diabetic foot, neuropathy, and prevention of limb loss.    Shoe inspection. Diabetic Foot Education. Patient reminded of the importance of good nutrition/healthy diet/weight management and blood sugar control to help prevent podiatric  complications of diabetes. Patient instructed on proper foot hygeine. Wear comfortable, proper fitting shoes. Wash feet daily. Dry well. After drying, apply moisturizer to feet (no lotion to webspaces). Inspect feet daily for skin breaks, blisters, swelling, or redness. Wear cotton socks (preferably white)  Change socks every day. Do NOT walk barefoot. Do NOT use heating pads or hot water soaks. We discussed wearing proper shoe gear, daily foot inspections, never walking without protective shoe gear.     Discussed ankle sprains and tendonitis and importance of immobilization for healing as well as the lengthy course of treatment for complete resolution.    Significant decrease in pain reaction on today's visit    Patient given the following regimen     Week One: Immobilize in boot. No stretching  Week Two: begin seated stretches; still wear boot daily  Week Three: begin standing stretches 3 times a week, still wear boot daily  Week Four- Six:  Transition into a supportive shoes with a brace and ankle support and more aggressive stretching       1. Instructions on elevation to reduce pain and swelling. When sleeping, place a pillow under the injured leg. When sitting, support the injured leg so it is level with your waist.   2. Patient instructed on adequate icing techniques. Patient should ice the affected area at least once per day x 10 minutes. I advised the  patient that extra icing would also be beneficial to ensure adequate anti inflammatory effect   3. Patient already on NSAIDs    She will continue to monitor the areas daily, inspect his feet, wear protective shoe gear when ambulatory, moisturizer to maintain skin integrity and follow in this office in approximately 6 months, sooner p.r.n.

## 2018-09-25 NOTE — PATIENT INSTRUCTIONS
given the following regimen:    Week One: Immobilize in boot. No stretching  Week Two: begin seated stretches; still wear boot daily  Week Three: begin standing stretches 3 times a week, still wear boot daily  Week Four- Six:  Transition into a supportive shoes with a brace and ankle support and more aggressive stretching       Strain, Sprain, or Contusion  Strains, sprains, and contusions are common injuries. These injuries are similar, but involve different types of body tissue. Most of these injuries happen during sports or active play. But they can occur at any time. A strain, sprain, or contusion can be painful. With the right treatment, most heal with no lasting problems.              What is a strain?  A strain is an injury to a muscle or to a tendon (tissue that connects muscle to bone). It is sometimes called a pulled muscle. A strain happens when a muscle or tendon is stretched too far or is partially torn. Symptoms of a strain are pain, swelling, and having a problem moving or using the injured area. The hamstring (thigh muscle), calf muscle, and Achilles tendon are commonly strained.   What is a sprain?  A sprain is an injury to a ligament (tissue that connects bones to other bones). Joints contain many ligaments. A sprain happens when a joint is twisted or pulled and the ligament stretches or tears. Symptoms of a sprain are pain, swelling, and having a problem moving or using the injured area. Ankles, knees, and wrists are the joints most commonly sprained.   What is a contusion?  A contusion is commonly called a bruise. It is injury to tissue that causes bleeding without breaking the skin. It is often a result of being hit by a blunt object such as a ball or bat. Symptoms of a contusion are discoloration of the skin, pain (which can be severe), and swelling. Contusions usually arent serious and dont need medical attention. But a large, painful, or very swollen bruise, or a bruise that limits  movement of a joint such as the knee should be seen by a doctor.   How are strains, sprains, and contusions diagnosed?  An examination is also done. An X-ray (test that creates images of bones) may be done to rule out broken bones.  How are strains, sprains, and contusions treated?  · Strains and sprains can take up to months to heal. If not treated and allowed to heal, a strain or sprain can lead to long-term problems. These include lasting pain and stiffness. So it is important to follow the doctors instructions.  · The pain of a contusion often resolves within the first week. But the swelling and discoloration may take weeks to go away.  Treatment consists of one or more of the following:  · RICE (which stands for Rest, Ice, Compression, and Elevation)  ¨ Rest. As much as possible, you should not use the injured area.  ¨ Ice. Put ice on the injured area 3-4 times a day for 20 minutes at a time. Use an ice pack or bag of frozen peas wrapped in a thin towel.   ¨ Compression. If instructed, wrap the area to keep swelling down. Use an elastic bandage. .  ¨ Elevation.  Raise the injured body part above the level of your heart.  · Medications to relieve inflammation and pain. These will likely be NSAIDs (non-steroidal anti-inflammatory drugs). NSAIDs include ibuprofen and naproxen.   · Physical therapy (PT) to strengthen the injured area. This is especially helpful for moderate to severe strains or sprains.  · Casting of the affected area to keep it still and allow the strain or sprain to heal.  · Surgery may be needed if the strain or sprain is severe and there is tearing. During surgery, the torn muscle, tendon, or ligament is repaired.  What are the long-term concerns?  If allowed to heal, most strains, sprains, and contusions cause no further problems. Strains or sprains that are not treated and dont heal properly can lead to pain or stiffness that doesnt go away. Be sure to follow your childs treatment plan.  Your childs doctor can tell you more about the expected outcome based on your childs injury.     Preventing strains, sprains, and contusions  If playing sports or doing other athletic activity, be sure you:  · Has proper training.  · Wears protective gear.  · Warms up before activity and cools down afterward.  · Uses proper equipment.   © 2099-1022 [employer]. [address] [place] [address], [place] [address]. All rights reserved. This information is not intended as a substitute for professional medical care. Always follow your healthcare professional's instructions.    Recommend lotions: eucerin, eucerin for diabetics, aquaphor, A&D ointment, gold bond for diabetics, sween, Raleigh's Bees all purpose baby ointment,  urea 40 with aloe (found on amazon.com)    Shoe recommendations: (try 6pm.com, zappos.Soft Tissue Regeneration , nordstromrack.Soft Tissue Regeneration, or shoes.Soft Tissue Regeneration for discounted prices) you can visit DSW shoes in [place]  or [employer] [facility] in the [facility] (there are also several shoe brand outlets in the [facility])    Asics (GT 2000 or gel foundations), new balance stability type shoes (900 series), saucony (stabil c3),  Shi (GTS or Beast or transcend), vionic, propet (tennis shoe)    sofft brand, clarks, crocs, aerosoles, naturalizers, SAS, ecco, born, jason gurrola, rockports (dress shoes)    Vionic, burkenstocks, fitflops, propet (sandals)  Nike comfort thong sandals, crocs, propet (house shoes)    Nail Home remedy:  Vicks Vapor rub to nails for easier managability    Occasional soaks for 15-20 mins in luke warm water with 1 cup of listerine and 1 cup of apple cider vinegar are ok You may add several drops of oil of oregano or tea tree oil as well        Diabetes: Inspecting Your Feet  Diabetes increases your chances of developing foot problems. So inspect your feet every day. This helps you find small skin irritations before they become serious infections. If you have trouble seeing the bottoms of your feet, use a mirror or ask a  family member or friend to help.     Pressure spots on the bottom of the foot are common areas where problems develop.   How to check your feet  Below are tips to help you look for foot problems. Try to check your feet at the same time each day, such as when you get out of bed in the morning:  · Check the top of each foot. The tops of toes, back of the heel, and outer edge of the foot can get a lot of rubbing from poor-fitting shoes.  · Check the bottom of each foot. Daily wear and tear often leads to problems at pressure spots.  · Check the toes and nails. Fungal infections often occur between toes. Toenail problems can also be a sign of fungal infections or lead to breaks in the skin.  · Check your shoes, too. Loose objects inside a shoe can injure the foot. Use your hand to feel inside your shoes for things like tami, loose stitching, or rough areas that could irritate your skin.  Warning signs  Look for any color changes in the foot. Redness with streaks can signal a severe infection, which needs immediate medical attention. Tell your doctor right away if you have any of these problems:  · Swelling, sometimes with color changes, may be a sign of poor blood flow or infection. Symptoms include tenderness and an increase in the size of your foot.  · Warm or hot areas on your feet may be signs of infection. A foot that is cold may not be getting enough blood.  · Sensations such as burning, tingling, or pins and needles can be signs of a problem. Also check for areas that may be numb.  · Hot spots are caused by friction or pressure. Look for hot spots in areas that get a lot of rubbing. Hot spots can turn into blisters, calluses, or sores.  · Cracks and sores are caused by dry or irritated skin. They are a sign that the skin is breaking down, which can lead to infection.  · Toenail problems to watch for include nails growing into the skin (ingrown toenail) and causing redness or pain. Thick, yellow, or  discolored nails can signal a fungal infection.  · Drainage and odor can develop from untreated sores and ulcers. Call your doctor right away if you notice white or yellow drainage, bleeding, or unpleasant odor.   © 7077-6139 [employer]. [address] [place] [address]. All rights reserved. This information is not intended as a substitute for professional medical care. Always follow your healthcare professional's instructions.        Step-by-Step:  Inspecting Your Feet (Diabetes)    Date Last Reviewed: 10/1/2016  © 8884-0326 [employer]. [address] [place] [address]. All rights reserved. This information is not intended as a substitute for professional medical care. Always follow your healthcare professional's instructions.

## 2018-09-27 ENCOUNTER — TELEPHONE (OUTPATIENT)
Dept: SMOKING CESSATION | Facility: CLINIC | Age: 48
End: 2018-09-27

## 2018-09-27 NOTE — TELEPHONE ENCOUNTER
[facility]- called to check on patient, no show for last appointment. Left message to call back to reschedule [phone] .

## 2018-10-09 ENCOUNTER — TELEPHONE (OUTPATIENT)
Dept: FAMILY MEDICINE | Facility: CLINIC | Age: 48
End: 2018-10-09

## 2018-10-09 DIAGNOSIS — M53.86 DECREASED RANGE OF MOTION OF INTERVERTEBRAL DISCS OF LUMBAR SPINE: Primary | ICD-10-CM

## 2018-10-09 NOTE — TELEPHONE ENCOUNTER
----- Message from Jolie Toledo sent at 10/9/2018  2:37 PM CDT -----  Contact: self [phone]  Pt needs another referral to extend her healthy back program

## 2018-10-10 DIAGNOSIS — M54.32 LEFT SIDED SCIATICA: ICD-10-CM

## 2018-10-10 DIAGNOSIS — J44.1 COPD EXACERBATION: ICD-10-CM

## 2018-10-10 DIAGNOSIS — E11.59 HYPERTENSION ASSOCIATED WITH DIABETES: ICD-10-CM

## 2018-10-10 DIAGNOSIS — I15.2 HYPERTENSION ASSOCIATED WITH DIABETES: ICD-10-CM

## 2018-10-10 RX ORDER — GABAPENTIN 600 MG/1
TABLET ORAL
Qty: 180 TABLET | Refills: 0 | Status: SHIPPED | OUTPATIENT
Start: 2018-10-10 | End: 2018-10-31

## 2018-10-10 RX ORDER — MOMETASONE FUROATE AND FORMOTEROL FUMARATE DIHYDRATE 100; 5 UG/1; UG/1
AEROSOL RESPIRATORY (INHALATION)
Qty: 13 G | Refills: 2 | Status: SHIPPED | OUTPATIENT
Start: 2018-10-10 | End: 2018-10-31 | Stop reason: SDUPTHER

## 2018-10-10 RX ORDER — LISINOPRIL 5 MG/1
TABLET ORAL
Qty: 30 TABLET | Refills: 5 | Status: SHIPPED | OUTPATIENT
Start: 2018-10-10 | End: 2018-10-31 | Stop reason: SDUPTHER

## 2018-10-23 DIAGNOSIS — M54.50 CHRONIC MIDLINE LOW BACK PAIN WITHOUT SCIATICA: ICD-10-CM

## 2018-10-23 DIAGNOSIS — G89.29 CHRONIC MIDLINE LOW BACK PAIN WITHOUT SCIATICA: ICD-10-CM

## 2018-10-23 RX ORDER — METHOCARBAMOL 500 MG/1
TABLET, FILM COATED ORAL
Qty: 100 TABLET | Refills: 0 | Status: SHIPPED | OUTPATIENT
Start: 2018-10-23 | End: 2018-10-31 | Stop reason: SDUPTHER

## 2018-10-27 ENCOUNTER — OFFICE VISIT (OUTPATIENT)
Dept: URGENT CARE | Facility: CLINIC | Age: 48
End: 2018-10-27
Payer: MEDICAID

## 2018-10-27 VITALS
HEART RATE: 70 BPM | TEMPERATURE: 98 F | BODY MASS INDEX: 37.45 KG/M2 | WEIGHT: 233 LBS | OXYGEN SATURATION: 97 % | HEIGHT: 66 IN | SYSTOLIC BLOOD PRESSURE: 138 MMHG | DIASTOLIC BLOOD PRESSURE: 80 MMHG

## 2018-10-27 DIAGNOSIS — K04.7 DENTAL INFECTION: Primary | ICD-10-CM

## 2018-10-27 PROCEDURE — 99214 OFFICE O/P EST MOD 30 MIN: CPT | Mod: S$GLB,,, | Performed by: NURSE PRACTITIONER

## 2018-10-27 RX ORDER — CLINDAMYCIN HYDROCHLORIDE 300 MG/1
300 CAPSULE ORAL 3 TIMES DAILY
Qty: 21 CAPSULE | Refills: 0 | Status: SHIPPED | OUTPATIENT
Start: 2018-10-27 | End: 2018-11-03

## 2018-10-27 RX ORDER — TRAMADOL HYDROCHLORIDE 50 MG/1
50 TABLET ORAL EVERY 6 HOURS PRN
Qty: 12 TABLET | Refills: 0 | Status: SHIPPED | OUTPATIENT
Start: 2018-10-27 | End: 2018-11-01

## 2018-10-27 NOTE — PROGRESS NOTES
"Subjective:       Patient ID: Audrey Natarajan is a 48 y.o. female.    Vitals:  height is 5' 6" (1.676 m) and weight is 105.7 kg (233 lb). Her temperature is 98.2 °F (36.8 °C). Her blood pressure is 138/80 and her pulse is 70. Her oxygen saturation is 97%.     Chief Complaint: Dental Pain    Pt reports she had dental work 9 days ago and ran out of tramadol for pain and is requesting a refill.    reviewed she received 10 tramadol. Pt states that she did not receive a prescription for antibiotics and is draining purulent drainage with severe pain. Has appt for November 1st for follow up.       Dental Pain    This is a new problem. Episode onset: 9 days. The pain is moderate. Pertinent negatives include no fever. Treatments tried: tramadol.     Review of Systems   Constitution: Negative for chills and fever.   HENT: Negative for sore throat.    Eyes: Negative for blurred vision.   Cardiovascular: Negative for chest pain.   Respiratory: Negative for shortness of breath.    Skin: Negative for rash.   Musculoskeletal: Negative for back pain and joint pain.   Gastrointestinal: Negative for abdominal pain, diarrhea, nausea and vomiting.   Neurological: Negative for headaches.   Psychiatric/Behavioral: The patient is not nervous/anxious.        Objective:      Physical Exam   Constitutional: She is oriented to person, place, and time. She appears well-developed and well-nourished. She is cooperative.  Non-toxic appearance. She does not appear ill. No distress.   HENT:   Head: Normocephalic and atraumatic.   Right Ear: Hearing, tympanic membrane, external ear and ear canal normal.   Left Ear: Hearing, tympanic membrane, external ear and ear canal normal.   Nose: Nose normal. No mucosal edema, rhinorrhea or nasal deformity. No epistaxis. Right sinus exhibits no maxillary sinus tenderness and no frontal sinus tenderness. Left sinus exhibits no maxillary sinus tenderness and no frontal sinus tenderness.   Mouth/Throat: " Uvula is midline, oropharynx is clear and moist and mucous membranes are normal. No trismus in the jaw. Abnormal dentition. Dental caries present. No uvula swelling. No posterior oropharyngeal erythema.       Eyes: Conjunctivae and lids are normal. Right eye exhibits no discharge. Left eye exhibits no discharge. No scleral icterus.   Sclera clear bilat   Neck: Trachea normal, normal range of motion, full passive range of motion without pain and phonation normal. Neck supple.   Cardiovascular: Normal rate, regular rhythm, normal heart sounds, intact distal pulses and normal pulses.   Pulmonary/Chest: Effort normal and breath sounds normal. No respiratory distress.   Abdominal: Normal appearance. She exhibits no pulsatile midline mass.   Musculoskeletal: Normal range of motion. She exhibits no edema or deformity.   Neurological: She is alert and oriented to person, place, and time. She exhibits normal muscle tone. Coordination normal.   Skin: Skin is warm, dry and intact. She is not diaphoretic. No pallor.   Psychiatric: She has a normal mood and affect. Her speech is normal and behavior is normal. Judgment and thought content normal. Cognition and memory are normal.   Nursing note and vitals reviewed.      Assessment:       1. Dental infection        Plan:         Dental infection  -     traMADol (ULTRAM) 50 mg tablet; Take 1 tablet (50 mg total) by mouth every 6 (six) hours as needed for Pain.  Dispense: 12 tablet; Refill: 0  -     clindamycin (CLEOCIN) 300 MG capsule; Take 1 capsule (300 mg total) by mouth 3 (three) times daily. for 7 days  Dispense: 21 capsule; Refill: 0            Patient Instructions   FOLLOW UP WITH YOUR DENTIST AS SCHEDULED ON November 1ST. IF YOUR PAIN WORSENS OR DOES NOT IMPROVE WITH CURRENT MANAGEMENT GO TO ER. TAKE MOTRIN 800 MG AS PRESCRIBED. DO NOT DRINK OR DRIVE WHILE TAKING PAIN MEDICATION    If you were prescribed a narcotic medication, do not drive or operate heavy equipment or  "machinery while taking these medications.  You must understand that you've received an Urgent Care treatment only and that you may be released before all your medical problems are known or treated. You, the patient, will arrange for follow up care as instructed.  If your condition worsens we recommend that you receive another evaluation at the emergency room immediately or contact your primary medical clinics after hours call service to discuss your concerns.  Please return here or go to the Emergency Department for any concerns or worsening of condition.      Dental Abscess    An abscess is a pocket of pus at the tip of a tooth root in your jaw bone. It is caused by an infection at the root of the tooth. It can cause pain and swelling of the gum, cheek, or jaw. Pain may spread from the tooth to your ear or the area of your jaw on the same side. If the abscess isnt treated, it appears as a bubble or swelling on the gum near the tooth. The pressure that builds in this swelling is the source of the pain. More serious infections cause your face to swell.  An abscess can be caused by a crack in the tooth, a cavity, a gum infection, or a combination of these. Once the pulp of the tooth is exposed, bacteria can spread down the roots to the tip. If the bacteria are not stopped, they can damage the bone and soft tissue, and an abscess can form.  Home care  Follow these guidelines when caring for yourself at home:  · Avoid hot and cold foods and drinks. Your tooth may be sensitive to changes in temperature. Dont chew on the side of the infected tooth.  · If your tooth is chipped or cracked, or if there is a large open cavity, put oil of cloves directly on the tooth to relieve pain. You can buy oil of cloves at drugstores. Some pharmacies carry an over-the-counter "toothache kit." This contains a paste that you can put on the exposed tooth to make it less sensitive.  · Put a cold pack on your jaw over the sore area to help " reduce pain.  · You may use over-the-counter medicine to ease pain, unless another medicine was prescribed. If you have chronic liver or kidney disease, talk with your healthcare provider before using acetaminophen or ibuprofen. Also talk with your provider if youve had a stomach ulcer or GI bleeding.  · An antibiotic will be prescribed. Take it until finished, even if you are feeling better after a few days.  Follow-up care  Follow up with your dentist or an oral surgeon, or as advised. Once an infection occurs in a tooth, it will continue to be a problem until the infection is drained. This is done through surgery or a root canal. Or you may need to have your tooth pulled.  Call 911  Call 911 if any of these occur:  · Unusual drowsiness  · Headache or stiff neck  · Weakness or fainting  · Difficulty swallowing, breathing, or opening your mouth  · Swollen eyelids  When to seek medical advice  Call your healthcare provider right away if any of these occur:  · Your face becomes more swollen or red  · Pain gets worse or spreads to your neck  · Fever of 100.4º F (38.0º C) or higher, or as directed by your healthcare provider  · Pus drains from the tooth  Date Last Reviewed: 10/1/2016  © 0176-1426 [employer]. [address] [place] [address], [place] [address]. All rights reserved. This information is not intended as a substitute for professional medical care. Always follow your healthcare professional's instructions.

## 2018-10-27 NOTE — PATIENT INSTRUCTIONS
FOLLOW UP WITH YOUR DENTIST AS SCHEDULED ON November 1ST. IF YOUR PAIN WORSENS OR DOES NOT IMPROVE WITH CURRENT MANAGEMENT GO TO ER. TAKE MOTRIN 800 MG AS PRESCRIBED. DO NOT DRINK OR DRIVE WHILE TAKING PAIN MEDICATION    If you were prescribed a narcotic medication, do not drive or operate heavy equipment or machinery while taking these medications.  You must understand that you've received an Urgent Care treatment only and that you may be released before all your medical problems are known or treated. You, the patient, will arrange for follow up care as instructed.  If your condition worsens we recommend that you receive another evaluation at the emergency room immediately or contact your primary medical clinics after hours call service to discuss your concerns.  Please return here or go to the Emergency Department for any concerns or worsening of condition.      Dental Abscess    An abscess is a pocket of pus at the tip of a tooth root in your jaw bone. It is caused by an infection at the root of the tooth. It can cause pain and swelling of the gum, cheek, or jaw. Pain may spread from the tooth to your ear or the area of your jaw on the same side. If the abscess isnt treated, it appears as a bubble or swelling on the gum near the tooth. The pressure that builds in this swelling is the source of the pain. More serious infections cause your face to swell.  An abscess can be caused by a crack in the tooth, a cavity, a gum infection, or a combination of these. Once the pulp of the tooth is exposed, bacteria can spread down the roots to the tip. If the bacteria are not stopped, they can damage the bone and soft tissue, and an abscess can form.  Home care  Follow these guidelines when caring for yourself at home:  · Avoid hot and cold foods and drinks. Your tooth may be sensitive to changes in temperature. Dont chew on the side of the infected tooth.  · If your tooth is chipped or cracked, or if there is a large open  "cavity, put oil of cloves directly on the tooth to relieve pain. You can buy oil of cloves at drugstores. Some pharmacies carry an over-the-counter "toothache kit." This contains a paste that you can put on the exposed tooth to make it less sensitive.  · Put a cold pack on your jaw over the sore area to help reduce pain.  · You may use over-the-counter medicine to ease pain, unless another medicine was prescribed. If you have chronic liver or kidney disease, talk with your healthcare provider before using acetaminophen or ibuprofen. Also talk with your provider if youve had a stomach ulcer or GI bleeding.  · An antibiotic will be prescribed. Take it until finished, even if you are feeling better after a few days.  Follow-up care  Follow up with your dentist or an oral surgeon, or as advised. Once an infection occurs in a tooth, it will continue to be a problem until the infection is drained. This is done through surgery or a root canal. Or you may need to have your tooth pulled.  Call 911  Call 911 if any of these occur:  · Unusual drowsiness  · Headache or stiff neck  · Weakness or fainting  · Difficulty swallowing, breathing, or opening your mouth  · Swollen eyelids  When to seek medical advice  Call your healthcare provider right away if any of these occur:  · Your face becomes more swollen or red  · Pain gets worse or spreads to your neck  · Fever of 100.4º F (38.0º C) or higher, or as directed by your healthcare provider  · Pus drains from the tooth  Date Last Reviewed: 10/1/2016  © 6867-6899 [employer]. [address] [place] [address], [place] [address]. All rights reserved. This information is not intended as a substitute for professional medical care. Always follow your healthcare professional's instructions.        "

## 2018-10-31 ENCOUNTER — OFFICE VISIT (OUTPATIENT)
Dept: FAMILY MEDICINE | Facility: CLINIC | Age: 48
End: 2018-10-31
Payer: MEDICAID

## 2018-10-31 VITALS
DIASTOLIC BLOOD PRESSURE: 70 MMHG | OXYGEN SATURATION: 97 % | HEIGHT: 66 IN | BODY MASS INDEX: 37.6 KG/M2 | SYSTOLIC BLOOD PRESSURE: 132 MMHG | TEMPERATURE: 98 F | WEIGHT: 233.94 LBS | HEART RATE: 74 BPM

## 2018-10-31 DIAGNOSIS — K21.9 GASTROESOPHAGEAL REFLUX DISEASE WITHOUT ESOPHAGITIS: ICD-10-CM

## 2018-10-31 DIAGNOSIS — E11.9 TYPE 2 DIABETES MELLITUS WITHOUT COMPLICATION, WITHOUT LONG-TERM CURRENT USE OF INSULIN: ICD-10-CM

## 2018-10-31 DIAGNOSIS — J44.9 CHRONIC OBSTRUCTIVE PULMONARY DISEASE, UNSPECIFIED COPD TYPE: Primary | Chronic | ICD-10-CM

## 2018-10-31 DIAGNOSIS — J30.89 NON-SEASONAL ALLERGIC RHINITIS, UNSPECIFIED TRIGGER: ICD-10-CM

## 2018-10-31 DIAGNOSIS — G89.29 CHRONIC MIDLINE LOW BACK PAIN WITHOUT SCIATICA: ICD-10-CM

## 2018-10-31 DIAGNOSIS — M54.50 CHRONIC MIDLINE LOW BACK PAIN WITHOUT SCIATICA: ICD-10-CM

## 2018-10-31 DIAGNOSIS — I10 ESSENTIAL HYPERTENSION: ICD-10-CM

## 2018-10-31 DIAGNOSIS — Z23 NEED FOR INFLUENZA VACCINATION: ICD-10-CM

## 2018-10-31 DIAGNOSIS — M54.32 LEFT SIDED SCIATICA: ICD-10-CM

## 2018-10-31 DIAGNOSIS — J44.1 COPD EXACERBATION: ICD-10-CM

## 2018-10-31 PROBLEM — J18.9 PNEUMONIA OF LEFT LOWER LOBE DUE TO INFECTIOUS ORGANISM: Status: RESOLVED | Noted: 2018-02-26 | Resolved: 2018-10-31

## 2018-10-31 PROCEDURE — 99213 OFFICE O/P EST LOW 20 MIN: CPT | Mod: PBBFAC,PN | Performed by: INTERNAL MEDICINE

## 2018-10-31 PROCEDURE — 99999 PR PBB SHADOW E&M-EST. PATIENT-LVL III: CPT | Mod: PBBFAC,,, | Performed by: INTERNAL MEDICINE

## 2018-10-31 PROCEDURE — 90471 IMMUNIZATION ADMIN: CPT | Mod: PBBFAC,PN

## 2018-10-31 PROCEDURE — 99214 OFFICE O/P EST MOD 30 MIN: CPT | Mod: S$PBB,,, | Performed by: INTERNAL MEDICINE

## 2018-10-31 RX ORDER — GABAPENTIN 600 MG/1
TABLET ORAL
Qty: 180 TABLET | Refills: 0 | Status: CANCELLED | OUTPATIENT
Start: 2018-10-31

## 2018-10-31 RX ORDER — FUROSEMIDE 20 MG/1
20 TABLET ORAL DAILY
Qty: 30 TABLET | Refills: 5 | Status: SHIPPED | OUTPATIENT
Start: 2018-10-31 | End: 2019-09-04 | Stop reason: SDUPTHER

## 2018-10-31 RX ORDER — GABAPENTIN 600 MG/1
600 TABLET ORAL 2 TIMES DAILY
Qty: 60 TABLET | Refills: 5 | Status: SHIPPED | OUTPATIENT
Start: 2018-10-31 | End: 2019-11-03 | Stop reason: SDUPTHER

## 2018-10-31 RX ORDER — PRAVASTATIN SODIUM 40 MG/1
40 TABLET ORAL DAILY
Qty: 30 TABLET | Refills: 5 | Status: SHIPPED | OUTPATIENT
Start: 2018-10-31 | End: 2019-05-01 | Stop reason: SDUPTHER

## 2018-10-31 RX ORDER — PROMETHAZINE HYDROCHLORIDE AND DEXTROMETHORPHAN HYDROBROMIDE 6.25; 15 MG/5ML; MG/5ML
5 SYRUP ORAL EVERY 8 HOURS PRN
Qty: 118 ML | Refills: 1 | Status: SHIPPED | OUTPATIENT
Start: 2018-10-31 | End: 2018-11-10

## 2018-10-31 RX ORDER — LISINOPRIL 5 MG/1
5 TABLET ORAL DAILY
Qty: 30 TABLET | Refills: 5 | Status: SHIPPED | OUTPATIENT
Start: 2018-10-31 | End: 2019-05-01 | Stop reason: SDUPTHER

## 2018-10-31 RX ORDER — ALBUTEROL SULFATE 90 UG/1
AEROSOL, METERED RESPIRATORY (INHALATION)
Qty: 18 G | Refills: 2 | Status: SHIPPED | OUTPATIENT
Start: 2018-10-31 | End: 2019-01-25 | Stop reason: SDUPTHER

## 2018-10-31 RX ORDER — FLUTICASONE PROPIONATE 50 MCG
1 SPRAY, SUSPENSION (ML) NASAL DAILY
Qty: 16 G | Refills: 3 | Status: SHIPPED | OUTPATIENT
Start: 2018-10-31 | End: 2019-04-20 | Stop reason: SDUPTHER

## 2018-10-31 RX ORDER — MELOXICAM 15 MG/1
TABLET ORAL NIGHTLY PRN
Refills: 1 | Status: CANCELLED | OUTPATIENT
Start: 2018-10-31

## 2018-10-31 RX ORDER — LORATADINE 10 MG/1
10 TABLET ORAL DAILY
Qty: 30 TABLET | Refills: 5 | Status: SHIPPED | OUTPATIENT
Start: 2018-10-31 | End: 2019-04-20 | Stop reason: SDUPTHER

## 2018-10-31 RX ORDER — METHOCARBAMOL 750 MG/1
TABLET, FILM COATED ORAL
Qty: 60 TABLET | Refills: 2 | Status: SHIPPED | OUTPATIENT
Start: 2018-10-31 | End: 2019-07-09 | Stop reason: DRUGHIGH

## 2018-10-31 RX ORDER — LISDEXAMFETAMINE DIMESYLATE 40 MG/1
CAPSULE ORAL
Refills: 0 | Status: CANCELLED | OUTPATIENT
Start: 2018-10-31

## 2018-10-31 RX ORDER — IBUPROFEN 800 MG/1
800 TABLET ORAL 3 TIMES DAILY
Qty: 90 TABLET | Refills: 1 | Status: CANCELLED | OUTPATIENT
Start: 2018-10-31

## 2018-10-31 RX ORDER — METFORMIN HYDROCHLORIDE 1000 MG/1
TABLET ORAL
Qty: 60 TABLET | Refills: 5 | Status: SHIPPED | OUTPATIENT
Start: 2018-10-31 | End: 2019-05-01 | Stop reason: SDUPTHER

## 2018-10-31 RX ORDER — METOPROLOL TARTRATE 25 MG/1
25 TABLET, FILM COATED ORAL DAILY
Qty: 30 TABLET | Refills: 5 | Status: SHIPPED | OUTPATIENT
Start: 2018-10-31 | End: 2019-05-01 | Stop reason: SDUPTHER

## 2018-10-31 RX ORDER — PHENOL/SODIUM PHENOLATE
AEROSOL, SPRAY (ML) MUCOUS MEMBRANE
Qty: 60 EACH | Refills: 5 | Status: SHIPPED | OUTPATIENT
Start: 2018-10-31 | End: 2019-12-05 | Stop reason: CLARIF

## 2018-10-31 NOTE — PROGRESS NOTES
"Subjective:       Patient ID: Audrey Natarajan is a 48 y.o. female.    Chief Complaint: Medication Refill and Cough    F/u chronic conditions    HPI: 47 y/o with chronic low back pain obesity (s/p bariatric surgery) DM HTN COPD tobacco dependence here for scheduled follow up. Recent tooth extraction complicated by infection she is on clindamycin since starting antibiotic face less swollen no difficulty with swallowing no fevers chills. Only other complaint is "allergies" notes nasal congestion coughing no wheezing using preventative inhaler as prescribed.       Review of Systems   Constitutional: Negative for activity change, appetite change, fatigue, fever and unexpected weight change.   HENT: Negative for ear pain, rhinorrhea and sore throat.    Eyes: Negative for discharge and visual disturbance.   Respiratory: Negative for chest tightness, shortness of breath and wheezing.    Cardiovascular: Negative for chest pain, palpitations and leg swelling.   Gastrointestinal: Negative for abdominal pain, constipation and diarrhea.   Endocrine: Negative for cold intolerance and heat intolerance.   Genitourinary: Negative for dysuria and hematuria.   Musculoskeletal: Positive for back pain. Negative for joint swelling and neck stiffness.   Skin: Negative for rash.   Neurological: Negative for dizziness, syncope, weakness and headaches.   Psychiatric/Behavioral: Negative for suicidal ideas.       Objective:     Vitals:    10/31/18 0903   BP: 132/70   Pulse: 74   Temp: 98.4 °F (36.9 °C)   TempSrc: Oral   SpO2: 97%   Weight: 106.1 kg (233 lb 14.5 oz)   Height: 5' 6" (1.676 m)          Physical Exam   Constitutional: She is oriented to person, place, and time. She appears well-developed and well-nourished.   HENT:   Head: Normocephalic and atraumatic.   TM's intact w/o effusion   Eyes: Conjunctivae are normal. No scleral icterus.   Neck: Normal range of motion.   Cardiovascular: Normal rate and regular rhythm. Exam reveals " no gallop and no friction rub.   No murmur heard.  No pitting edema   Pulmonary/Chest: Effort normal and breath sounds normal. She has no wheezes. She has no rales.   Good air movement to bases bilaterally   Abdominal: Soft. Bowel sounds are normal. There is no tenderness. There is no rebound and no guarding.   Musculoskeletal: Normal range of motion. She exhibits no edema or tenderness.   Wearing flip/flop type sandles   Neurological: She is alert and oriented to person, place, and time. No cranial nerve deficit.   Skin: Skin is warm and dry.   Psychiatric: She has a normal mood and affect.       Assessment and Plan   1. Non-seasonal allergic rhinitis, unspecified trigger  Continue upper airway treatments prn cough suppressant  - fluticasone (FLONASE ALLERGY RELIEF) 50 mcg/actuation nasal spray; 1 spray (50 mcg total) by Each Nare route once daily.  Dispense: 16 g; Refill: 3  - loratadine (CLARITIN) 10 mg tablet; Take 1 tablet (10 mg total) by mouth once daily.  Dispense: 30 tablet; Refill: 5  - promethazine-dextromethorphan (PROMETHAZINE-DM) 6.25-15 mg/5 mL Syrp; Take 5 mLs by mouth every 8 (eight) hours as needed.  Dispense: 118 mL; Refill: 1    2. Type 2 diabetes mellitus without complication, without long-term current use of insulin  Repeat labs today continue metformin and statin BP at goal on low dose ACEi  - pravastatin (PRAVACHOL) 40 MG tablet; Take 1 tablet (40 mg total) by mouth once daily.  Dispense: 30 tablet; Refill: 5  - CBC auto differential; Future  - Comprehensive metabolic panel; Future  - Hemoglobin A1c; Future  - Lipid panel; Future    3. Gastroesophageal reflux disease without esophagitis  Stable continue  - omeprazole 20 mg TbEC; TAKE 2 TABLETS BY MOUTH ONCE DAILY AT 6AM  Dispense: 60 each; Refill: 5  - metFORMIN (GLUCOPHAGE) 1000 MG tablet; TAKE 1 TABLET(1000 MG) BY MOUTH TWICE DAILY WITH MEALS  Dispense: 60 tablet; Refill: 5    4. Essential hypertension  bp at goal continue current  medicaitons  - metoprolol tartrate (LOPRESSOR) 25 MG tablet; Take 1 tablet (25 mg total) by mouth once daily.  Dispense: 30 tablet; Refill: 5  - lisinopril (PRINIVIL,ZESTRIL) 5 MG tablet; Take 1 tablet (5 mg total) by mouth once daily.  Dispense: 30 tablet; Refill: 5  - furosemide (LASIX) 20 MG tablet; Take 1 tablet (20 mg total) by mouth once daily.  Dispense: 30 tablet; Refill: 5    5. Chronic midline low back pain without sciatica  Prn muscle relaxer  - methocarbamol (ROBAXIN) 750 MG Tab; TAKE 1 TABLET BY MOUTH EVERY 6 HOURS AS NEEDED  Dispense: 60 tablet; Refill: 2      7. Left sided sciatica  No change on reduced dose of gabapentin   - gabapentin (NEURONTIN) 600 MG tablet; Take 1 tablet (600 mg total) by mouth 2 (two) times daily.  Dispense: 60 tablet; Refill: 5    8. Chronic obstructive pulmonary disease, unspecified COPD type  Inhalers and maintenance medications good SpO2 currently  - albuterol (VENTOLIN HFA) 90 mcg/actuation inhaler; INHALE 2 PUFFS BY MOUTH INTO THE LUNGS EVERY 6 HOURS AS NEEDED FOR WHEEZING. RESCUE.  Dispense: 18 g; Refill: 2  - mometasone-formoterol (DULERA) 100-5 mcg/actuation HFAA; Inhale 2 puffs into the lungs 2 (two) times daily. Controller  Dispense: 13 g; Refill: 5    9. Need for influenza vaccination  Flu vaccine today  - Influenza - Quadrivalent (3 years & older)

## 2018-11-05 ENCOUNTER — OFFICE VISIT (OUTPATIENT)
Dept: OPHTHALMOLOGY | Facility: CLINIC | Age: 48
End: 2018-11-05
Payer: MEDICAID

## 2018-11-05 DIAGNOSIS — H52.7 REFRACTIVE ERROR: Primary | ICD-10-CM

## 2018-11-05 DIAGNOSIS — H25.13 NUCLEAR SCLEROSIS OF BOTH EYES: ICD-10-CM

## 2018-11-05 PROCEDURE — 99212 OFFICE O/P EST SF 10 MIN: CPT | Mod: PBBFAC,PO | Performed by: OPHTHALMOLOGY

## 2018-11-05 PROCEDURE — 99999 PR PBB SHADOW E&M-EST. PATIENT-LVL II: CPT | Mod: PBBFAC,,, | Performed by: OPHTHALMOLOGY

## 2018-11-05 PROCEDURE — 99499 UNLISTED E&M SERVICE: CPT | Mod: S$PBB,,, | Performed by: OPHTHALMOLOGY

## 2018-11-05 NOTE — PROGRESS NOTES
Subjective:       Patient ID: Audrey Natarajan is a 48 y.o. female.    Chief Complaint: Refractive Error (6 weeks F/U MRx)    HPI     Refractive Error      Additional comments: 6 weeks F/U MRx              Comments     48 y.o. Female is here for 6 weeks F/U MRx. Denies eye pain and f/f. No   noticeable [facility] changes since last visit.     Eye Meds: No gtt           Last edited by STEPHANIE Self on 11/5/2018  3:14 PM. (History)             Assessment:       1. Refractive error    2. Nuclear sclerosis of both eyes        Plan:       RE-Pt is here for repeat MRx.  Cataracts- Not visually significant.        Give MRx.  RTC Dr Welch in 1 yr.

## 2018-11-20 ENCOUNTER — OFFICE VISIT (OUTPATIENT)
Dept: URGENT CARE | Facility: CLINIC | Age: 48
End: 2018-11-20
Payer: MEDICAID

## 2018-11-20 VITALS
TEMPERATURE: 98 F | BODY MASS INDEX: 37.45 KG/M2 | HEART RATE: 80 BPM | DIASTOLIC BLOOD PRESSURE: 80 MMHG | WEIGHT: 233 LBS | OXYGEN SATURATION: 97 % | SYSTOLIC BLOOD PRESSURE: 148 MMHG | HEIGHT: 66 IN

## 2018-11-20 DIAGNOSIS — G89.29 CHRONIC BILATERAL LOW BACK PAIN WITHOUT SCIATICA: ICD-10-CM

## 2018-11-20 DIAGNOSIS — K04.7 DENTAL ABSCESS: Primary | ICD-10-CM

## 2018-11-20 DIAGNOSIS — M54.50 CHRONIC BILATERAL LOW BACK PAIN WITHOUT SCIATICA: ICD-10-CM

## 2018-11-20 PROCEDURE — 99214 OFFICE O/P EST MOD 30 MIN: CPT | Mod: S$GLB,,, | Performed by: NURSE PRACTITIONER

## 2018-11-20 RX ORDER — MELOXICAM 15 MG/1
TABLET ORAL
Qty: 90 TABLET | Refills: 0 | Status: SHIPPED | OUTPATIENT
Start: 2018-11-20 | End: 2019-01-02 | Stop reason: SDUPTHER

## 2018-11-20 RX ORDER — CLINDAMYCIN HYDROCHLORIDE 300 MG/1
300 CAPSULE ORAL 3 TIMES DAILY
Qty: 30 CAPSULE | Refills: 0 | Status: SHIPPED | OUTPATIENT
Start: 2018-11-20 | End: 2018-11-30

## 2018-11-20 NOTE — PATIENT INSTRUCTIONS
"  Dental Abscess    An abscess is a pocket of pus at the tip of a tooth root in your jaw bone. It is caused by an infection at the root of the tooth. It can cause pain and swelling of the gum, cheek, or jaw. Pain may spread from the tooth to your ear or the area of your jaw on the same side. If the abscess isnt treated, it appears as a bubble or swelling on the gum near the tooth. The pressure that builds in this swelling is the source of the pain. More serious infections cause your face to swell.  An abscess can be caused by a crack in the tooth, a cavity, a gum infection, or a combination of these. Once the pulp of the tooth is exposed, bacteria can spread down the roots to the tip. If the bacteria are not stopped, they can damage the bone and soft tissue, and an abscess can form.  Home care  Follow these guidelines when caring for yourself at home:  · Avoid hot and cold foods and drinks. Your tooth may be sensitive to changes in temperature. Dont chew on the side of the infected tooth.  · If your tooth is chipped or cracked, or if there is a large open cavity, put oil of cloves directly on the tooth to relieve pain. You can buy oil of cloves at drugstores. Some pharmacies carry an over-the-counter "toothache kit." This contains a paste that you can put on the exposed tooth to make it less sensitive.  · Put a cold pack on your jaw over the sore area to help reduce pain.  · You may use over-the-counter medicine to ease pain, unless another medicine was prescribed. If you have chronic liver or kidney disease, talk with your healthcare provider before using acetaminophen or ibuprofen. Also talk with your provider if youve had a stomach ulcer or GI bleeding.  · An antibiotic will be prescribed. Take it until finished, even if you are feeling better after a few days.  Follow-up care  Follow up with your dentist or an oral surgeon, or as advised. Once an infection occurs in a tooth, it will continue to be a problem " until the infection is drained. This is done through surgery or a root canal. Or you may need to have your tooth pulled.  Call 911  Call 911 if any of these occur:  · Unusual drowsiness  · Headache or stiff neck  · Weakness or fainting  · Difficulty swallowing, breathing, or opening your mouth  · Swollen eyelids  When to seek medical advice  Call your healthcare provider right away if any of these occur:  · Your face becomes more swollen or red  · Pain gets worse or spreads to your neck  · Fever of 100.4º F (38.0º C) or higher, or as directed by your healthcare provider  · Pus drains from the tooth  Date Last Reviewed: 10/1/2016  © 3234-8561 [employer]. [address] [place] [address], [place] [address]. All rights reserved. This information is not intended as a substitute for professional medical care. Always follow your healthcare professional's instructions.

## 2018-11-20 NOTE — PROGRESS NOTES
"Subjective:       Patient ID: Audrey Natarajan is a 48 y.o. female.    Vitals:  height is 5' 6" (1.676 m) and weight is 105.7 kg (233 lb). Her temperature is 98.2 °F (36.8 °C). Her blood pressure is 148/80 (abnormal) and her pulse is 80. Her oxygen saturation is 97%.     Chief Complaint: Dental Pain    Pt reports yesterday she had 3 upper teeth extracted and was prescribed tramadol 50 mg and it is not working       Dental Pain    This is a new problem. The current episode started yesterday. Pertinent negatives include no fever. Treatments tried: tramadol. The treatment provided mild relief.       Constitution: Negative for chills, fatigue and fever.   HENT: Positive for dental problem and facial swelling. Negative for congestion and sore throat.    Neck: Negative for painful lymph nodes.   Cardiovascular: Negative for chest pain and leg swelling.   Eyes: Negative for double vision and blurred vision.   Respiratory: Negative for cough and shortness of breath.    Gastrointestinal: Negative for nausea, vomiting and diarrhea.   Genitourinary: Negative for dysuria, frequency, urgency and history of kidney stones.   Musculoskeletal: Negative for joint pain, joint swelling, muscle cramps and muscle ache.   Skin: Negative for color change, pale, rash and bruising.   Allergic/Immunologic: Negative for seasonal allergies.   Neurological: Positive for headaches. Negative for dizziness, history of vertigo, light-headedness and passing out.   Hematologic/Lymphatic: Negative for swollen lymph nodes.   Psychiatric/Behavioral: Negative for nervous/anxious, sleep disturbance and depression. The patient is not nervous/anxious.        Objective:      Physical Exam   Constitutional: She is oriented to person, place, and time. She appears well-developed and well-nourished. She is cooperative.  Non-toxic appearance. She does not appear ill. No distress.   HENT:   Head: Normocephalic and atraumatic.   Right Ear: Hearing, tympanic " membrane and ear canal normal.   Left Ear: Hearing, tympanic membrane and ear canal normal.   Nose: No mucosal edema, rhinorrhea or nasal deformity. No epistaxis. Right sinus exhibits no maxillary sinus tenderness and no frontal sinus tenderness. Left sinus exhibits no maxillary sinus tenderness and no frontal sinus tenderness.   Mouth/Throat: Uvula is midline and mucous membranes are normal. No trismus in the jaw. Normal dentition. Dental abscesses present. No uvula swelling. No posterior oropharyngeal erythema.       Eyes: Conjunctivae and lids are normal. Right eye exhibits no discharge. Left eye exhibits no discharge. No scleral icterus.   Sclera clear bilat   Neck: Trachea normal, normal range of motion, full passive range of motion without pain and phonation normal. Neck supple.   Cardiovascular: Normal rate, regular rhythm, normal heart sounds, intact distal pulses and normal pulses.   Pulmonary/Chest: Effort normal and breath sounds normal. No respiratory distress.   Abdominal: Soft. Normal appearance and bowel sounds are normal. She exhibits no distension, no pulsatile midline mass and no mass. There is no tenderness.   Musculoskeletal: Normal range of motion. She exhibits no edema or deformity.   Neurological: She is alert and oriented to person, place, and time. She exhibits normal muscle tone. Coordination normal.   Skin: Skin is warm, dry and intact. She is not diaphoretic. No pallor.   Psychiatric: She has a normal mood and affect. Her speech is normal and behavior is normal. Judgment and thought content normal. Cognition and memory are normal.   Nursing note and vitals reviewed.      Assessment:       1. Dental abscess        Plan:       Patient Instructions     Dental Abscess    An abscess is a pocket of pus at the tip of a tooth root in your jaw bone. It is caused by an infection at the root of the tooth. It can cause pain and swelling of the gum, cheek, or jaw. Pain may spread from the tooth to  "your ear or the area of your jaw on the same side. If the abscess isnt treated, it appears as a bubble or swelling on the gum near the tooth. The pressure that builds in this swelling is the source of the pain. More serious infections cause your face to swell.  An abscess can be caused by a crack in the tooth, a cavity, a gum infection, or a combination of these. Once the pulp of the tooth is exposed, bacteria can spread down the roots to the tip. If the bacteria are not stopped, they can damage the bone and soft tissue, and an abscess can form.  Home care  Follow these guidelines when caring for yourself at home:  · Avoid hot and cold foods and drinks. Your tooth may be sensitive to changes in temperature. Dont chew on the side of the infected tooth.  · If your tooth is chipped or cracked, or if there is a large open cavity, put oil of cloves directly on the tooth to relieve pain. You can buy oil of cloves at drugstores. Some pharmacies carry an over-the-counter "toothache kit." This contains a paste that you can put on the exposed tooth to make it less sensitive.  · Put a cold pack on your jaw over the sore area to help reduce pain.  · You may use over-the-counter medicine to ease pain, unless another medicine was prescribed. If you have chronic liver or kidney disease, talk with your healthcare provider before using acetaminophen or ibuprofen. Also talk with your provider if youve had a stomach ulcer or GI bleeding.  · An antibiotic will be prescribed. Take it until finished, even if you are feeling better after a few days.  Follow-up care  Follow up with your dentist or an oral surgeon, or as advised. Once an infection occurs in a tooth, it will continue to be a problem until the infection is drained. This is done through surgery or a root canal. Or you may need to have your tooth pulled.  Call 911  Call 911 if any of these occur:  · Unusual drowsiness  · Headache or stiff neck  · Weakness or " fainting  · Difficulty swallowing, breathing, or opening your mouth  · Swollen eyelids  When to seek medical advice  Call your healthcare provider right away if any of these occur:  · Your face becomes more swollen or red  · Pain gets worse or spreads to your neck  · Fever of 100.4º F (38.0º C) or higher, or as directed by your healthcare provider  · Pus drains from the tooth  Date Last Reviewed: 10/1/2016  © 7480-4713 [employer]. [address] [place] [address]. All rights reserved. This information is not intended as a substitute for professional medical care. Always follow your healthcare professional's instructions.              Dental abscess    Other orders  -     clindamycin (CLEOCIN) 300 MG capsule; Take 1 capsule (300 mg total) by mouth 3 (three) times daily. for 10 days  Dispense: 30 capsule; Refill: 0

## 2018-11-24 DIAGNOSIS — H10.9 BACTERIAL CONJUNCTIVITIS OF RIGHT EYE: ICD-10-CM

## 2018-11-24 DIAGNOSIS — K21.9 GASTROESOPHAGEAL REFLUX DISEASE WITHOUT ESOPHAGITIS: ICD-10-CM

## 2018-11-24 RX ORDER — POLYMYXIN B SULFATE AND TRIMETHOPRIM 1; 10000 MG/ML; [USP'U]/ML
SOLUTION OPHTHALMIC
Qty: 10 ML | Refills: 0 | OUTPATIENT
Start: 2018-11-24

## 2018-11-26 RX ORDER — PHENOL/SODIUM PHENOLATE
AEROSOL, SPRAY (ML) MUCOUS MEMBRANE
Qty: 60 EACH | Refills: 1 | Status: SHIPPED | OUTPATIENT
Start: 2018-11-26 | End: 2019-01-30 | Stop reason: SDUPTHER

## 2018-12-03 ENCOUNTER — OFFICE VISIT (OUTPATIENT)
Dept: URGENT CARE | Facility: CLINIC | Age: 48
End: 2018-12-03
Payer: MEDICAID

## 2018-12-03 ENCOUNTER — TELEPHONE (OUTPATIENT)
Dept: FAMILY MEDICINE | Facility: CLINIC | Age: 48
End: 2018-12-03

## 2018-12-03 VITALS
OXYGEN SATURATION: 98 % | DIASTOLIC BLOOD PRESSURE: 80 MMHG | BODY MASS INDEX: 37.45 KG/M2 | TEMPERATURE: 97 F | HEART RATE: 70 BPM | SYSTOLIC BLOOD PRESSURE: 132 MMHG | WEIGHT: 233 LBS | HEIGHT: 66 IN

## 2018-12-03 DIAGNOSIS — J01.40 ACUTE NON-RECURRENT PANSINUSITIS: Primary | ICD-10-CM

## 2018-12-03 DIAGNOSIS — R11.10 VOMITING, INTRACTABILITY OF VOMITING NOT SPECIFIED, PRESENCE OF NAUSEA NOT SPECIFIED, UNSPECIFIED VOMITING TYPE: ICD-10-CM

## 2018-12-03 PROCEDURE — 99214 OFFICE O/P EST MOD 30 MIN: CPT | Mod: S$GLB,,, | Performed by: NURSE PRACTITIONER

## 2018-12-03 RX ORDER — AZITHROMYCIN 250 MG/1
TABLET, FILM COATED ORAL
Qty: 6 TABLET | Refills: 0 | Status: SHIPPED | OUTPATIENT
Start: 2018-12-03 | End: 2018-12-08

## 2018-12-03 RX ORDER — PROMETHAZINE HYDROCHLORIDE AND DEXTROMETHORPHAN HYDROBROMIDE 6.25; 15 MG/5ML; MG/5ML
5 SYRUP ORAL NIGHTLY PRN
Qty: 120 ML | Refills: 0 | Status: SHIPPED | OUTPATIENT
Start: 2018-12-03 | End: 2018-12-13

## 2018-12-03 RX ORDER — PREDNISONE 20 MG/1
20 TABLET ORAL DAILY
Qty: 5 TABLET | Refills: 0 | Status: SHIPPED | OUTPATIENT
Start: 2018-12-03 | End: 2018-12-08

## 2018-12-03 RX ORDER — ONDANSETRON 8 MG/1
8 TABLET, ORALLY DISINTEGRATING ORAL EVERY 6 HOURS PRN
Qty: 20 TABLET | Refills: 0 | Status: SHIPPED | OUTPATIENT
Start: 2018-12-03 | End: 2018-12-08

## 2018-12-03 RX ORDER — METHYLPREDNISOLONE 4 MG/1
TABLET ORAL
Qty: 21 TABLET | Refills: 0 | Status: SHIPPED | OUTPATIENT
Start: 2018-12-03 | End: 2019-01-30 | Stop reason: ALTCHOICE

## 2018-12-03 NOTE — PATIENT INSTRUCTIONS
CONTINUE OTC CLARITIN    USE COUGH SYRUP AT NIGHT ONLY- WILL MAKE YOU DROWSY    CAN USE ZOFRAN EVERY 6 HOURS AS NEEDED FOR NAUSEA- TAKE 45 MINUTES BEFORE EATING    DRINK LOTS OF WATER- SMALL, FREQUENT SIPS     Sinusitis (Antibiotic Treatment)    The sinuses are air-filled spaces within the bones of the face. They connect to the inside of the nose. Sinusitis is an inflammation of the tissue lining the sinus cavity. Sinus inflammation can occur during a cold. It can also be due to allergies to pollens and other particles in the air. Sinusitis can cause symptoms of sinus congestion and fullness. A sinus infection causes fever, headache and facial pain. There is often green or yellow drainage from the nose or into the back of the throat (post-nasal drip). You have been given antibiotics to treat this condition.  Home care:  · Take the full course of antibiotics as instructed. Do not stop taking them, even if you feel better.  · Drink plenty of water, hot tea, and other liquids. This may help thin mucus. It also may promote sinus drainage.  · Heat may help soothe painful areas of the face. Use a towel soaked in hot water. Or,  the shower and direct the hot spray onto your face. Using a vaporizer along with a menthol rub at night may also help.   · An expectorant containing guaifenesin may help thin the mucus and promote drainage from the sinuses.  · Over-the-counter decongestants may be used unless a similar medicine was prescribed. Nasal sprays work the fastest. Use one that contains phenylephrine or oxymetazoline. First blow the nose gently. Then use the spray. Do not use these medicines more often than directed on the label or symptoms may get worse. You may also use tablets containing pseudoephedrine. Avoid products that combine ingredients, because side effects may be increased. Read labels. You can also ask the pharmacist for help. (NOTE: Persons with high blood pressure should not use decongestants.  They can raise blood pressure.)  · Over-the-counter antihistamines may help if allergies contributed to your sinusitis.    · Do not use nasal rinses or irrigation during an acute sinus infection, unless told to by your health care provider. Rinsing may spread the infection to other sinuses.  · Use acetaminophen or ibuprofen to control pain, unless another pain medicine was prescribed. (If you have chronic liver or kidney disease or ever had a stomach ulcer, talk with your doctor before using these medicines. Aspirin should never be used in anyone under 18 years of age who is ill with a fever. It may cause severe liver damage.)  · Don't smoke. This can worsen symptoms.  Follow-up care  Follow up with your healthcare provider or our staff if you are not improving within the next week.  When to seek medical advice  Call your healthcare provider if any of these occur:  · Facial pain or headache becoming more severe  · Stiff neck  · Unusual drowsiness or confusion  · Swelling of the forehead or eyelids  · Vision problems, including blurred or double vision  · Fever of 100.4ºF (38ºC) or higher, or as directed by your healthcare provider  · Seizure  · Breathing problems  · Symptoms not resolving within 10 days  Date Last Reviewed: 4/13/2015  © 9417-3400 [employer]. [address] [place] [address], [place] [address]. All rights reserved. This information is not intended as a substitute for professional medical care. Always follow your healthcare professional's instructions.        [employer] Diet  Your healthcare provider may recommend a bland diet if you have an upset stomach. It consists of foods that are mild and easy to digest. It is better to eat small frequent meals rather than 3 large meals a day.    Beverages  OK: Fruit juices, non-caffeinated teas and coffee, non-carbonated montoya  Avoid: Carbonated beverage, caffeinated tea and coffee, all alcoholic beverages  Bread  OK: Refined white, wheat or rye bread, charity  "or soda crackers, Beatrice toast, plain rolls, bagels  Avoid: Whole-grain bread  Cereal  OK: Refined cereals: cooked or ready to eat  Avoid: Whole-grain cereals and granola, or those containing bran, seeds or nuts  Desserts  OK: Peanut butter and all others except those to "avoid"  Avoid: Chocolate, cocoa, coconut, popcorn, nuts, seeds, jam, marmalade  Fruits  OK: Canned, cooked, frozen or fresh fruits without seeds or tough skin  Avoid: Olives, skin and seeds of fruit  Meats  OK: All fresh or preserved meat, fish and fowl  Avoid: Any that are prepared with those spices to "avoid"  Cheese and eggs  OK: Eggs, cottage cheese, cream cheese, other cheeses  Avoid: All cheeses made with those spices to "avoid"  Potatoes and pasta  OK: Potato, rice, macaroni, noodles, spaghetti  Avoid: None  Soups  OK: All soups without heavy seasoning  Avoid: Soups made with those spices to "avoid"  Vegetables  OK: Canned, cooked, fresh or frozen mildly flavored vegetables without seeds, skins or coarse fiber  Avoid: Vegetables prepared with those spices to "avoid"; skin and seeds of vegetables and those with coarse fiber  Spices  OK: Salt, lemon and lime juice, vinegar, all extracts, dorene, cinnamon, thyme, mace, allspice, paprika  Avoid: Chili powder, cloves, pepper, seed spices, garlic, gravy pickles, highly seasoned salad dressings  Date Last Reviewed: 11/20/2015  © 2414-5308 [employer]. [address] [place] [address]. All rights reserved. This information is not intended as a substitute for professional medical care. Always follow your healthcare professional's instructions.        "

## 2018-12-03 NOTE — TELEPHONE ENCOUNTER
----- Message from Delmy Yañez sent at 12/3/2018 11:07 AM CST -----  Contact: SELF [phone]  Pt is requesting to speak to you .the patient has ear pain, cough, chills, weak and upset stomach . Pls call pt [phone]. Thanks..........Niyah

## 2018-12-03 NOTE — TELEPHONE ENCOUNTER
Pt states that for the past week she has been having symptoms of cough, vomiting, fatigue, chills, confusion and left ear pain. She describes the ear pain as pulsing and she rates it as a 10. Notified pt if she feels worse to go to ED or urgent care. Pt wants to know what she should do, please advise.

## 2018-12-03 NOTE — PROGRESS NOTES
"Subjective:       Patient ID: Audrey Natarajan is a 48 y.o. female.    Vitals:  height is 5' 6" (1.676 m) and weight is 105.7 kg (233 lb). Her temperature is 97.2 °F (36.2 °C). Her blood pressure is 132/80 and her pulse is 70. Her oxygen saturation is 98%.     Chief Complaint: URI    Pt reports having productive cough and congestion with fatigue for 6 days and  vomiting that started today.  Pt denies wheezing or shortness of breath.        URI    This is a new problem. The current episode started in the past 7 days. Associated symptoms include congestion, coughing, headaches, sinus pain, sneezing and a sore throat. Pertinent negatives include no ear pain, nausea, rash, vomiting or wheezing. She has tried nothing for the symptoms.       Constitution: Negative for chills, sweating, fatigue and fever.   HENT: Positive for congestion, postnasal drip, sinus pain, sinus pressure and sore throat. Negative for ear pain and voice change.    Neck: Negative for painful lymph nodes.   Eyes: Negative for eye redness.   Respiratory: Positive for cough and sputum production. Negative for chest tightness, bloody sputum, COPD, shortness of breath, stridor, wheezing and asthma.    Gastrointestinal: Negative for nausea and vomiting.   Musculoskeletal: Negative for muscle ache.   Skin: Negative for rash.   Allergic/Immunologic: Positive for sneezing. Negative for seasonal allergies and asthma.   Neurological: Positive for headaches.   Hematologic/Lymphatic: Negative for swollen lymph nodes.       Objective:      Physical Exam   Constitutional: She is oriented to person, place, and time. Vital signs are normal. She appears well-developed and well-nourished. She is cooperative.  Non-toxic appearance. She does not appear ill. No distress.   HENT:   Head: Normocephalic and atraumatic.   Right Ear: Hearing, external ear and ear canal normal. A middle ear effusion is present.   Left Ear: Hearing, external ear and ear canal normal. A " middle ear effusion is present.   Nose: Mucosal edema and rhinorrhea present. No nasal deformity. No epistaxis. Right sinus exhibits maxillary sinus tenderness and frontal sinus tenderness. Left sinus exhibits maxillary sinus tenderness and frontal sinus tenderness.   Mouth/Throat: Uvula is midline, oropharynx is clear and moist and mucous membranes are normal. No trismus in the jaw. Normal dentition. No uvula swelling. No oropharyngeal exudate, posterior oropharyngeal edema or posterior oropharyngeal erythema.   Eyes: Conjunctivae and lids are normal. No scleral icterus.   Sclera clear bilat   Neck: Trachea normal, full passive range of motion without pain and phonation normal. Neck supple.   Cardiovascular: Normal rate, regular rhythm, normal heart sounds, intact distal pulses and normal pulses.   Pulmonary/Chest: Effort normal and breath sounds normal. No stridor. No respiratory distress. She has no decreased breath sounds. She has no wheezes.   Abdominal: Soft. Normal appearance and bowel sounds are normal. She exhibits no distension. There is no tenderness.   Musculoskeletal: Normal range of motion. She exhibits no edema or deformity.   Lymphadenopathy:     She has no cervical adenopathy.   Neurological: She is alert and oriented to person, place, and time. She exhibits normal muscle tone. Coordination normal.   Skin: Skin is warm, dry and intact. She is not diaphoretic. No pallor.   Psychiatric: She has a normal mood and affect. Her speech is normal and behavior is normal. Judgment and thought content normal. Cognition and memory are normal.   Nursing note and vitals reviewed.      Assessment:       1. Acute non-recurrent pansinusitis    2. Vomiting, intractability of vomiting not specified, presence of nausea not specified, unspecified vomiting type        Plan:         Acute non-recurrent pansinusitis  -     azithromycin (Z-TAMEKA) 250 MG tablet; Take 2 tablets by mouth on day 1; Take 1 tablet by mouth on days  2-5  Dispense: 6 tablet; Refill: 0  -     predniSONE (DELTASONE) 20 MG tablet; Take 1 tablet (20 mg total) by mouth once daily. for 5 days  Dispense: 5 tablet; Refill: 0  -     promethazine-dextromethorphan (PROMETHAZINE-DM) 6.25-15 mg/5 mL Syrp; Take 5 mLs by mouth nightly as needed.  Dispense: 120 mL; Refill: 0    Vomiting, intractability of vomiting not specified, presence of nausea not specified, unspecified vomiting type  -     ondansetron (ZOFRAN-ODT) 8 MG TbDL; Take 1 tablet (8 mg total) by mouth every 6 (six) hours as needed.  Dispense: 20 tablet; Refill: 0      Patient Instructions       CONTINUE OTC CLARITIN    USE COUGH SYRUP AT NIGHT ONLY- WILL MAKE YOU DROWSY    CAN USE ZOFRAN EVERY 6 HOURS AS NEEDED FOR NAUSEA- TAKE 45 MINUTES BEFORE EATING    DRINK LOTS OF WATER- SMALL, FREQUENT SIPS     Sinusitis (Antibiotic Treatment)    The sinuses are air-filled spaces within the bones of the face. They connect to the inside of the nose. Sinusitis is an inflammation of the tissue lining the sinus cavity. Sinus inflammation can occur during a cold. It can also be due to allergies to pollens and other particles in the air. Sinusitis can cause symptoms of sinus congestion and fullness. A sinus infection causes fever, headache and facial pain. There is often green or yellow drainage from the nose or into the back of the throat (post-nasal drip). You have been given antibiotics to treat this condition.  Home care:  · Take the full course of antibiotics as instructed. Do not stop taking them, even if you feel better.  · Drink plenty of water, hot tea, and other liquids. This may help thin mucus. It also may promote sinus drainage.  · Heat may help soothe painful areas of the face. Use a towel soaked in hot water. Or,  the shower and direct the hot spray onto your face. Using a vaporizer along with a menthol rub at night may also help.   · An expectorant containing guaifenesin may help thin the mucus and promote  drainage from the sinuses.  · Over-the-counter decongestants may be used unless a similar medicine was prescribed. Nasal sprays work the fastest. Use one that contains phenylephrine or oxymetazoline. First blow the nose gently. Then use the spray. Do not use these medicines more often than directed on the label or symptoms may get worse. You may also use tablets containing pseudoephedrine. Avoid products that combine ingredients, because side effects may be increased. Read labels. You can also ask the pharmacist for help. (NOTE: Persons with high blood pressure should not use decongestants. They can raise blood pressure.)  · Over-the-counter antihistamines may help if allergies contributed to your sinusitis.    · Do not use nasal rinses or irrigation during an acute sinus infection, unless told to by your health care provider. Rinsing may spread the infection to other sinuses.  · Use acetaminophen or ibuprofen to control pain, unless another pain medicine was prescribed. (If you have chronic liver or kidney disease or ever had a stomach ulcer, talk with your doctor before using these medicines. Aspirin should never be used in anyone under 18 years of age who is ill with a fever. It may cause severe liver damage.)  · Don't smoke. This can worsen symptoms.  Follow-up care  Follow up with your healthcare provider or our staff if you are not improving within the next week.  When to seek medical advice  Call your healthcare provider if any of these occur:  · Facial pain or headache becoming more severe  · Stiff neck  · Unusual drowsiness or confusion  · Swelling of the forehead or eyelids  · Vision problems, including blurred or double vision  · Fever of 100.4ºF (38ºC) or higher, or as directed by your healthcare provider  · Seizure  · Breathing problems  · Symptoms not resolving within 10 days  Date Last Reviewed: 4/13/2015  © 2300-0234 [employer]. [address] [place] [address], [place] [address]. All rights  "reserved. This information is not intended as a substitute for professional medical care. Always follow your healthcare professional's instructions.        [place] Diet  Your healthcare provider may recommend a bland diet if you have an upset stomach. It consists of foods that are mild and easy to digest. It is better to eat small frequent meals rather than 3 large meals a day.    Beverages  OK: Fruit juices, non-caffeinated teas and coffee, non-carbonated montoya  Avoid: Carbonated beverage, caffeinated tea and coffee, all alcoholic beverages  Bread  OK: Refined white, wheat or rye bread, charity or soda crackers, Beatrice toast, plain rolls, bagels  Avoid: Whole-grain bread  Cereal  OK: Refined cereals: cooked or ready to eat  Avoid: Whole-grain cereals and granola, or those containing bran, seeds or nuts  Desserts  OK: Peanut butter and all others except those to "avoid"  Avoid: Chocolate, cocoa, coconut, popcorn, nuts, seeds, jam, marmalade  Fruits  OK: Canned, cooked, frozen or fresh fruits without seeds or tough skin  Avoid: Olives, skin and seeds of fruit  Meats  OK: All fresh or preserved meat, fish and fowl  Avoid: Any that are prepared with those spices to "avoid"  Cheese and eggs  OK: Eggs, cottage cheese, cream cheese, other cheeses  Avoid: All cheeses made with those spices to "avoid"  Potatoes and pasta  OK: Potato, rice, macaroni, noodles, spaghetti  Avoid: None  Soups  OK: All soups without heavy seasoning  Avoid: Soups made with those spices to "avoid"  Vegetables  OK: Canned, cooked, fresh or frozen mildly flavored vegetables without seeds, skins or coarse fiber  Avoid: Vegetables prepared with those spices to "avoid"; skin and seeds of vegetables and those with coarse fiber  Spices  OK: Salt, lemon and lime juice, vinegar, all extracts, dorene, cinnamon, thyme, mace, allspice, paprika  Avoid: Chili powder, cloves, pepper, seed spices, garlic, gravy pickles, highly seasoned salad dressings  Date Last " Reviewed: 11/20/2015  © 2474-1813 [employer]. [address] [place] [address], [place] [address]. All rights reserved. This information is not intended as a substitute for professional medical care. Always follow your healthcare professional's instructions.

## 2019-01-01 DIAGNOSIS — G89.29 CHRONIC LEFT-SIDED LOW BACK PAIN WITHOUT SCIATICA: ICD-10-CM

## 2019-01-01 DIAGNOSIS — M54.50 CHRONIC LEFT-SIDED LOW BACK PAIN WITHOUT SCIATICA: ICD-10-CM

## 2019-01-02 RX ORDER — IBUPROFEN 600 MG/1
TABLET ORAL
Qty: 60 TABLET | Refills: 0 | Status: SHIPPED | OUTPATIENT
Start: 2019-01-02 | End: 2019-01-21 | Stop reason: SDUPTHER

## 2019-01-07 ENCOUNTER — DOCUMENTATION ONLY (OUTPATIENT)
Dept: REHABILITATION | Facility: HOSPITAL | Age: 49
End: 2019-01-07

## 2019-01-16 ENCOUNTER — PATIENT OUTREACH (OUTPATIENT)
Dept: ADMINISTRATIVE | Facility: HOSPITAL | Age: 49
End: 2019-01-16

## 2019-01-16 DIAGNOSIS — E11.9 DM TYPE 2 WITHOUT RETINOPATHY: Primary | ICD-10-CM

## 2019-01-17 ENCOUNTER — HOSPITAL ENCOUNTER (EMERGENCY)
Facility: HOSPITAL | Age: 49
Discharge: HOME | End: 2019-01-17
Attending: EMERGENCY MEDICINE
Payer: MEDICAID

## 2019-01-17 VITALS
WEIGHT: 218 LBS | BODY MASS INDEX: 35.03 KG/M2 | DIASTOLIC BLOOD PRESSURE: 61 MMHG | SYSTOLIC BLOOD PRESSURE: 119 MMHG | RESPIRATION RATE: 18 BRPM | OXYGEN SATURATION: 98 % | TEMPERATURE: 98 F | HEIGHT: 66 IN | HEART RATE: 91 BPM

## 2019-01-17 DIAGNOSIS — M54.41 CHRONIC BILATERAL LOW BACK PAIN WITH BILATERAL SCIATICA: Primary | ICD-10-CM

## 2019-01-17 DIAGNOSIS — M54.42 CHRONIC BILATERAL LOW BACK PAIN WITH BILATERAL SCIATICA: Primary | ICD-10-CM

## 2019-01-17 DIAGNOSIS — G89.29 CHRONIC BILATERAL LOW BACK PAIN WITH BILATERAL SCIATICA: Primary | ICD-10-CM

## 2019-01-17 PROCEDURE — 63600175 PHARM REV CODE 636 W HCPCS: Performed by: PHYSICIAN ASSISTANT

## 2019-01-17 PROCEDURE — 25000003 PHARM REV CODE 250: Performed by: PHYSICIAN ASSISTANT

## 2019-01-17 PROCEDURE — 99284 EMERGENCY DEPT VISIT MOD MDM: CPT

## 2019-01-17 PROCEDURE — 96372 THER/PROPH/DIAG INJ SC/IM: CPT | Mod: 59

## 2019-01-17 RX ORDER — MELOXICAM 7.5 MG/1
7.5 TABLET ORAL DAILY
Qty: 12 TABLET | Refills: 0 | Status: SHIPPED | OUTPATIENT
Start: 2019-01-17 | End: 2019-02-07

## 2019-01-17 RX ORDER — ONDANSETRON 4 MG/1
4 TABLET, ORALLY DISINTEGRATING ORAL
Status: COMPLETED | OUTPATIENT
Start: 2019-01-17 | End: 2019-01-17

## 2019-01-17 RX ORDER — ACETAMINOPHEN 500 MG
1000 TABLET ORAL
Status: COMPLETED | OUTPATIENT
Start: 2019-01-17 | End: 2019-01-17

## 2019-01-17 RX ORDER — HYDROMORPHONE HYDROCHLORIDE 2 MG/ML
0.5 INJECTION, SOLUTION INTRAMUSCULAR; INTRAVENOUS; SUBCUTANEOUS
Status: COMPLETED | OUTPATIENT
Start: 2019-01-17 | End: 2019-01-17

## 2019-01-17 RX ORDER — LIDOCAINE 50 MG/G
1 PATCH TOPICAL DAILY
Qty: 6 PATCH | Refills: 0 | OUTPATIENT
Start: 2019-01-17 | End: 2019-09-01

## 2019-01-17 RX ORDER — ORPHENADRINE CITRATE 100 MG/1
100 TABLET, EXTENDED RELEASE ORAL 2 TIMES DAILY
Qty: 8 TABLET | Refills: 0 | Status: SHIPPED | OUTPATIENT
Start: 2019-01-17 | End: 2019-01-21

## 2019-01-17 RX ORDER — KETOROLAC TROMETHAMINE 30 MG/ML
30 INJECTION, SOLUTION INTRAMUSCULAR; INTRAVENOUS
Status: COMPLETED | OUTPATIENT
Start: 2019-01-17 | End: 2019-01-17

## 2019-01-17 RX ADMIN — ACETAMINOPHEN 1000 MG: 500 TABLET, FILM COATED ORAL at 02:01

## 2019-01-17 RX ADMIN — HYDROMORPHONE HYDROCHLORIDE 0.5 MG: 2 INJECTION INTRAMUSCULAR; INTRAVENOUS; SUBCUTANEOUS at 02:01

## 2019-01-17 RX ADMIN — KETOROLAC TROMETHAMINE 30 MG: 30 INJECTION, SOLUTION INTRAMUSCULAR; INTRAVENOUS at 02:01

## 2019-01-17 RX ADMIN — ONDANSETRON 4 MG: 4 TABLET, ORALLY DISINTEGRATING ORAL at 02:01

## 2019-01-17 NOTE — ED NOTES
Patient discharge has been delayed due to patient's transportation home arrived at bedside and 15 min injection wait time

## 2019-01-17 NOTE — ED PROVIDER NOTES
Encounter Date: 1/17/2019       History     Chief Complaint   Patient presents with    Back Pain     x1.5 weeks, reports she was assisting her mother who had sx when she hurt her back; reports she's been using hot/cold packs with no relief; reports hx chronic back pain     48-year-old female with history of diabetes and chronic low back pain presents to the emergency department for 10 day history of acute diffuse low back pain that intermittently radiates down the right lower extremity after she attempted to lift her mom.  States symptoms are sharp, positional, and consistent with her history of chronic back pain. Pain does not radiate to the abdomen.  Denies chest pain and shortness of breath.  Denies numbness and fever.  Denies urinary symptoms.  Patient attempted Mobic which he typically takes for her back pain prior to arrival without relief of her symptoms. Patient notes that she had an MRI done in August of last year that revealed 2 bulging discs in her lumbar spine.  Patient has not followed up with Orthopedics.  Patient has attempted to going to pain management in the past, but was unsuccessful due to insurance reasons.  Patient is requesting pain control today.          Review of patient's allergies indicates:   Allergen Reactions    Morphine Other (See Comments)     Patient had a psychotic episode after taking Morphine  Agitation, hallucinations    Penicillins Anaphylaxis     itching     Past Medical History:   Diagnosis Date    ADHD (attention deficit hyperactivity disorder)     Arthritis     Asthma     Bipolar 1 disorder     Cataract     COPD (chronic obstructive pulmonary disease)     Coronary artery disease     A fib    Depression     bipolar manic depresson    Diabetes mellitus     DVT of lower extremity, bilateral July 2013    bilateral LE DVT. Estelita filter placed.     Encounter for blood transfusion     History of blood clots 1. Left Leg=2003; 2.Bilateral Groin=Blood Clots= 5 or  "6/ 2013 & 7/2013; 3. LLL of Lung=7/2013;  4. Lt. Lower Leg=7/2013.     Pt. had 1st Blood Clot after Foqjogjllzgx=0140, & Last=2013. Estelita Filter= Rt.Lateral Neck.    HTN (hypertension) 6/6/2013    Pt states that she does not have hypertension    Hypercholesteremia     Irregular heartbeat     Neuromuscular disorder     neuropathy feet    PE (pulmonary embolism) July 2013     bilat LE DVT.     Restless leg syndrome      Past Surgical History:   Procedure Laterality Date    ABDOMINAL SURGERY      BILATERAL OOPHORECTOMY Bilateral 1/12/2015    BIOPSY, BLADDER  9/18/2013    [facility]    CYSTOSCOPY N/A 9/18/2013    [facility]    ESOPHAGOGASTRODUODENOSCOPY (EGD) N/A 8/10/2016    [facility]    ESOPHAGOGASTRODUODENOSCOPY (EGD) N/A 10/8/2014    Per[facility] by Jarocho Fowler [facility] at [facility] ENDO    FULGURATION, BLADDER  9/18/2013    [facility]    GASTRECTOMY-SLEEVE-LAPAROSCOPIC N/A 8/10/2016    [facility] OR    Green' s filter Right 7/4/2012    Right Neck & Tunneled Down.    HERNIA REPAIR      "[place] of Hernias Repaires around th Belly Button.", pt. states    IRRIGATION, BLADDER N/A 9/18/2013    [facility] OR    LAPAROSCOPY W/REMOVAL OF INFECTED MESH  4/13/2015    [facility] OR    LAPAROTOMY-EXPLORATORY Bilateral 1/12/2015    [facility] 2ND FLR    OVARIAN CYST REMOVAL  3/13/2014    PA REMOVAL OF OVARY/TUBE(S)      REPAIR, HERNIA, VENTRAL, LAPAROSCOPIC N/A 3/14/2014    [facility] OR    YTFBYREP-[id number]-GTQLNWOKL (BSO) Bilateral 1/12/2015    [facility] 2ND FLR    SPLENECTOMY, TOTAL  July 2003    TONSILLECTOMY      as a child    TYMPANOSTOMY TUBE PLACEMENT  1976    VEIN " SURGERY  2003    Lt leg     Family History   Problem Relation Age of Onset    Hypertension Father     Diabetes Father     Heart disease Father     Cataracts Father     Diabetes Paternal Grandfather     Heart disease Paternal Grandfather     No Known Problems Mother     Ovarian cancer Maternal Grandmother          from this. ? age     No Known Problems Sister     No Known Problems Brother     No Known Problems Maternal Aunt     No Known Problems Maternal Uncle     No Known Problems Paternal Aunt     No Known Problems Paternal Uncle     No Known Problems Maternal Grandfather     Ovarian cancer Paternal Grandmother     Uterine cancer Neg Hx     Breast cancer Neg Hx     Colon cancer Neg Hx     Amblyopia Neg Hx     Blindness Neg Hx     Cancer Neg Hx     Glaucoma Neg Hx     Macular degeneration Neg Hx     Retinal detachment Neg Hx     Strabismus Neg Hx     Stroke Neg Hx     Thyroid disease Neg Hx      Social History     Tobacco Use    Smoking status: Current Every Day Smoker     Packs/day: 0.50     Years: 25.00     Pack years: 12.50     Types: Cigarettes    Smokeless tobacco: Never Used   Substance Use Topics    Alcohol use: No     Alcohol/week: 0.0 oz    Drug use: No     Review of Systems   Constitutional: Negative for fever.   Respiratory: Negative for shortness of breath.    Cardiovascular: Negative for chest pain and leg swelling.   Gastrointestinal: Negative for abdominal pain, nausea and vomiting.   Musculoskeletal: Positive for back pain. Negative for neck pain.   Skin: Negative for rash and wound.   Neurological: Negative for dizziness, syncope and numbness.   All other systems reviewed and are negative.      Physical Exam     Initial Vitals [19 0140]   BP Pulse Resp Temp SpO2   (!) 116/56 100 18 97.9 °F (36.6 °C) 100 %      MAP       --         Physical Exam    Nursing note and vitals reviewed.  Constitutional: She appears well-developed and well-nourished. She is not  "diaphoretic. No distress.   HENT:   Head: Normocephalic and atraumatic.   Nose: Nose normal.   Eyes: Conjunctivae and EOM are normal. Right eye exhibits no discharge. Left eye exhibits no discharge.   Neck: Normal range of motion. No tracheal deviation present. No JVD present.   Cardiovascular: Normal rate, regular rhythm and normal heart sounds. Exam reveals no friction rub.    No murmur heard.  Pulmonary/Chest: Breath sounds normal. No stridor. No respiratory distress. She has no wheezes. She has no rhonchi. She has no rales. She exhibits no tenderness.   Abdominal: Soft. She exhibits no distension. There is no tenderness. There is no rigidity, no rebound, no guarding, no CVA tenderness, no tenderness at McBurney's point and negative Oliveira's sign.   Musculoskeletal: Normal range of motion.   Reproducible TTP of the diffuse lumbar musculature. No midline tenderness or bony deformities noted down the neck and spine. No bony TTP to the hips. Ambulatory. (+) SLR on the R. Pedal pulses 2+ and equal. No rash/skin lesions.    Neurological: She is alert and oriented to person, place, and time. She displays no tremor. She displays no seizure activity. Coordination and gait normal.   Skin: Skin is warm and dry. No rash and no abscess noted. No erythema. No pallor.         ED Course   Procedures  Labs Reviewed - No data to display       Imaging Results    None          Medical Decision Making:   History:   Old Medical Records: I decided to obtain old medical records.      This is an emergent evaluation of a 48 y.o. female with a Hx of chronic low back pain presenting to the ED for "sharp" low back pain that intermittently radiates down the RLE. Denies traumatic injury, Hx of IV drug use, fever, urinary retention/loss of bowel bladder control, urinary symptoms, and abdominal pain. Afebrile. Patient is non-toxic appearing and in no acute distress. Ambulatory. (+) SLR. No sensory or motor deficit.     Presentation most " consistent with acute lumbosacral radiculopathy. No Hx of trauma to suggest acute vertebral fracture, or warrant emergent imaging at this time. I doubt cauda equina, AAA rupture, and ureteral stone. I have a low suspicion for infectious etiology, including for epidural abscess and pyelonephritis. I have a lower suspicion for neoplastic etiology.     Symptoms treated in ED. Discharged home with supportive care. Will defer narcotic pain medication to PCP for her chronic condition. Instructed the patient to follow up with PCP and orthopedics for reevaluation and management of symptoms. An educational resource on sciatica is issued to the patient.     I discussed with the patient the diagnosis, treatment plan, indications for return to the emergency department, and for expected follow-up. The patient verbalized an understanding. The patient is asked if there are any questions or concerns. We discuss the case, until all issues are addressed to the patients satisfaction. Patient understands and is agreeable to the plan.                     Clinical Impression:   The encounter diagnosis was Chronic bilateral low back pain with bilateral sciatica.                             Trav Earl PA-C  01/17/19 0559

## 2019-01-17 NOTE — ED TRIAGE NOTES
Patient arrived to ED with c/o lower back pain secondary to possibly injuring her back while assisting her mother x 1.5 weeks.  Reports history of old injury with chronic back pain.  Ambulatory without difficulty and no obvious deformities noted.

## 2019-01-21 DIAGNOSIS — G89.29 CHRONIC LEFT-SIDED LOW BACK PAIN WITHOUT SCIATICA: ICD-10-CM

## 2019-01-21 DIAGNOSIS — M54.50 CHRONIC LEFT-SIDED LOW BACK PAIN WITHOUT SCIATICA: ICD-10-CM

## 2019-01-21 RX ORDER — IBUPROFEN 600 MG/1
TABLET ORAL
Qty: 60 TABLET | Refills: 0 | Status: SHIPPED | OUTPATIENT
Start: 2019-01-21 | End: 2019-02-07 | Stop reason: SDUPTHER

## 2019-01-25 DIAGNOSIS — J44.9 CHRONIC OBSTRUCTIVE PULMONARY DISEASE, UNSPECIFIED COPD TYPE: Chronic | ICD-10-CM

## 2019-01-25 RX ORDER — ALBUTEROL SULFATE 90 UG/1
AEROSOL, METERED RESPIRATORY (INHALATION)
Qty: 18 G | Refills: 0 | Status: SHIPPED | OUTPATIENT
Start: 2019-01-25 | End: 2019-02-25 | Stop reason: SDUPTHER

## 2019-01-30 ENCOUNTER — OFFICE VISIT (OUTPATIENT)
Dept: FAMILY MEDICINE | Facility: CLINIC | Age: 49
End: 2019-01-30
Payer: MEDICAID

## 2019-01-30 ENCOUNTER — LAB VISIT (OUTPATIENT)
Dept: LAB | Facility: HOSPITAL | Age: 49
End: 2019-01-30
Attending: INTERNAL MEDICINE
Payer: MEDICAID

## 2019-01-30 ENCOUNTER — TELEPHONE (OUTPATIENT)
Dept: FAMILY MEDICINE | Facility: CLINIC | Age: 49
End: 2019-01-30

## 2019-01-30 VITALS
SYSTOLIC BLOOD PRESSURE: 108 MMHG | OXYGEN SATURATION: 100 % | HEART RATE: 86 BPM | TEMPERATURE: 98 F | WEIGHT: 216.5 LBS | DIASTOLIC BLOOD PRESSURE: 71 MMHG | RESPIRATION RATE: 17 BRPM | HEIGHT: 66 IN | BODY MASS INDEX: 34.79 KG/M2

## 2019-01-30 DIAGNOSIS — G89.29 CHRONIC LEFT-SIDED LOW BACK PAIN WITH SCIATICA, SCIATICA LATERALITY UNSPECIFIED: ICD-10-CM

## 2019-01-30 DIAGNOSIS — E11.42 TYPE 2 DIABETES MELLITUS WITH DIABETIC POLYNEUROPATHY, WITHOUT LONG-TERM CURRENT USE OF INSULIN: Primary | ICD-10-CM

## 2019-01-30 DIAGNOSIS — E11.9 TYPE 2 DIABETES MELLITUS WITHOUT COMPLICATION, WITHOUT LONG-TERM CURRENT USE OF INSULIN: ICD-10-CM

## 2019-01-30 DIAGNOSIS — G47.33 OSA ON CPAP: ICD-10-CM

## 2019-01-30 DIAGNOSIS — E11.42 TYPE 2 DIABETES MELLITUS WITH DIABETIC POLYNEUROPATHY, WITHOUT LONG-TERM CURRENT USE OF INSULIN: ICD-10-CM

## 2019-01-30 DIAGNOSIS — Z12.39 SCREENING BREAST EXAMINATION: Primary | ICD-10-CM

## 2019-01-30 DIAGNOSIS — M54.40 CHRONIC LEFT-SIDED LOW BACK PAIN WITH SCIATICA, SCIATICA LATERALITY UNSPECIFIED: ICD-10-CM

## 2019-01-30 LAB
ALBUMIN SERPL BCP-MCNC: 4.1 G/DL
ALP SERPL-CCNC: 85 U/L
ALT SERPL W/O P-5'-P-CCNC: 22 U/L
ANION GAP SERPL CALC-SCNC: 10 MMOL/L
ANION GAP SERPL CALC-SCNC: 10 MMOL/L
AST SERPL-CCNC: 23 U/L
BASOPHILS # BLD AUTO: 0.06 K/UL
BASOPHILS NFR BLD: 0.4 %
BILIRUB SERPL-MCNC: 0.4 MG/DL
BUN SERPL-MCNC: 12 MG/DL
BUN SERPL-MCNC: 12 MG/DL
CALCIUM SERPL-MCNC: 9.6 MG/DL
CALCIUM SERPL-MCNC: 9.6 MG/DL
CHLORIDE SERPL-SCNC: 101 MMOL/L
CHLORIDE SERPL-SCNC: 101 MMOL/L
CHOLEST SERPL-MCNC: 250 MG/DL
CHOLEST/HDLC SERPL: 5 {RATIO}
CO2 SERPL-SCNC: 28 MMOL/L
CO2 SERPL-SCNC: 28 MMOL/L
CREAT SERPL-MCNC: 0.7 MG/DL
CREAT SERPL-MCNC: 0.7 MG/DL
DIFFERENTIAL METHOD: ABNORMAL
EOSINOPHIL # BLD AUTO: 0.5 K/UL
EOSINOPHIL NFR BLD: 3 %
ERYTHROCYTE [DISTWIDTH] IN BLOOD BY AUTOMATED COUNT: 16.2 %
EST. GFR  (AFRICAN AMERICAN): >60 ML/MIN/1.73 M^2
EST. GFR  (AFRICAN AMERICAN): >60 ML/MIN/1.73 M^2
EST. GFR  (NON AFRICAN AMERICAN): >60 ML/MIN/1.73 M^2
EST. GFR  (NON AFRICAN AMERICAN): >60 ML/MIN/1.73 M^2
GLUCOSE SERPL-MCNC: 107 MG/DL
GLUCOSE SERPL-MCNC: 107 MG/DL
HCT VFR BLD AUTO: 43.7 %
HDLC SERPL-MCNC: 50 MG/DL
HDLC SERPL: 20 %
HGB BLD-MCNC: 14.2 G/DL
LDLC SERPL CALC-MCNC: 144.4 MG/DL
LYMPHOCYTES # BLD AUTO: 6.7 K/UL
LYMPHOCYTES NFR BLD: 43.2 %
MCH RBC QN AUTO: 29.3 PG
MCHC RBC AUTO-ENTMCNC: 32.5 G/DL
MCV RBC AUTO: 90 FL
MONOCYTES # BLD AUTO: 1.3 K/UL
MONOCYTES NFR BLD: 8.6 %
NEUTROPHILS # BLD AUTO: 7 K/UL
NEUTROPHILS NFR BLD: 44.8 %
NONHDLC SERPL-MCNC: 200 MG/DL
PLATELET # BLD AUTO: 533 K/UL
PMV BLD AUTO: 10.5 FL
POTASSIUM SERPL-SCNC: 4.2 MMOL/L
POTASSIUM SERPL-SCNC: 4.2 MMOL/L
PROT SERPL-MCNC: 7.4 G/DL
RBC # BLD AUTO: 4.84 M/UL
SODIUM SERPL-SCNC: 139 MMOL/L
SODIUM SERPL-SCNC: 139 MMOL/L
TRIGL SERPL-MCNC: 278 MG/DL
WBC # BLD AUTO: 15.53 K/UL

## 2019-01-30 PROCEDURE — 99999 PR PBB SHADOW E&M-EST. PATIENT-LVL III: ICD-10-PCS | Mod: PBBFAC,,, | Performed by: INTERNAL MEDICINE

## 2019-01-30 PROCEDURE — 83036 HEMOGLOBIN GLYCOSYLATED A1C: CPT

## 2019-01-30 PROCEDURE — 99214 OFFICE O/P EST MOD 30 MIN: CPT | Mod: S$PBB,,, | Performed by: INTERNAL MEDICINE

## 2019-01-30 PROCEDURE — 80061 LIPID PANEL: CPT

## 2019-01-30 PROCEDURE — 99213 OFFICE O/P EST LOW 20 MIN: CPT | Mod: PBBFAC,PN | Performed by: INTERNAL MEDICINE

## 2019-01-30 PROCEDURE — 80053 COMPREHEN METABOLIC PANEL: CPT

## 2019-01-30 PROCEDURE — 99214 PR OFFICE/OUTPT VISIT, EST, LEVL IV, 30-39 MIN: ICD-10-PCS | Mod: S$PBB,,, | Performed by: INTERNAL MEDICINE

## 2019-01-30 PROCEDURE — 36415 COLL VENOUS BLD VENIPUNCTURE: CPT | Mod: PN

## 2019-01-30 PROCEDURE — 99999 PR PBB SHADOW E&M-EST. PATIENT-LVL III: CPT | Mod: PBBFAC,,, | Performed by: INTERNAL MEDICINE

## 2019-01-30 PROCEDURE — 85025 COMPLETE CBC W/AUTO DIFF WBC: CPT

## 2019-01-30 NOTE — TELEPHONE ENCOUNTER
----- Message from Linda Golden sent at 1/30/2019 10:13 AM CST -----  Contact: Pt in person  Pt needs to have yearly mammo. Please call once orders are in. [phone]

## 2019-01-30 NOTE — PROGRESS NOTES
"Subjective:       Patient ID: Audrey Natarajan is a 48 y.o. female.    Chief Complaint: Establish Care and Sinus Problem    F/u chronic conditions    HPI: 47 y/o w/ DM morbid obesity s/p gastric bypass chronic lower back pain presents for follow up. Has been followign at [facility] pain specialist for chronic low back pain has been referred for laminectomy and lumbar fusion but she is reluctant to undergo further surgery. Acute flare of back pain three weeks ago when helpign to lift her mother (recent knee surgery) lumbar pain radiation to left side no loss of bowel or b ladder using NSAID and muscle relaxer with some relief.       Review of Systems   Constitutional: Negative for activity change, appetite change, fatigue, fever and unexpected weight change.   HENT: Positive for rhinorrhea and sinus pressure. Negative for ear pain and sore throat.    Eyes: Negative for discharge and visual disturbance.   Respiratory: Negative for chest tightness, shortness of breath and wheezing.    Cardiovascular: Negative for chest pain, palpitations and leg swelling.   Gastrointestinal: Negative for abdominal pain, constipation and diarrhea.   Endocrine: Negative for cold intolerance and heat intolerance.   Genitourinary: Negative for dysuria and hematuria.   Musculoskeletal: Positive for back pain. Negative for joint swelling and neck stiffness.   Skin: Negative for rash.   Neurological: Negative for dizziness, syncope, weakness and headaches.   Psychiatric/Behavioral: Negative for suicidal ideas.       Objective:     Vitals:    01/30/19 0916   BP: 108/71   BP Location: Left arm   Patient Position: Sitting   Pulse: 86   Resp: 17   Temp: 97.6 °F (36.4 °C)   TempSrc: Oral   SpO2: 100%   Weight: 98.2 kg (216 lb 7.9 oz)   Height: 5' 6" (1.676 m)          Physical Exam   Constitutional: She is oriented to person, place, and time. She appears well-developed and well-nourished.   HENT:   Head: Normocephalic and atraumatic.   Eyes: " Conjunctivae are normal. No scleral icterus.   Neck: Normal range of motion.   Cardiovascular: Normal rate and regular rhythm. Exam reveals no gallop and no friction rub.   No murmur heard.  Pulmonary/Chest: Effort normal and breath sounds normal. She has no wheezes. She has no rales.   Abdominal: Soft. Bowel sounds are normal. There is no tenderness. There is no rebound and no guarding.   Musculoskeletal: Normal range of motion. She exhibits no edema or tenderness.   Neurological: She is alert and oriented to person, place, and time. No cranial nerve deficit.   Skin: Skin is warm and dry.   Psychiatric: She has a normal mood and affect.       Assessment and Plan   1. Type 2 diabetes mellitus with diabetic polyneuropathy, without long-term current use of insulin  Labs today for adequacy of control continue statin, ACEi and metformin   - CBC auto differential; Future  - Comprehensive metabolic panel; Future  - Lipid panel; Future  - Hemoglobin A1c; Future    2. SHAWN on CPAP  New script for cpap supplies  - CPAP/BIPAP SUPPLIES    3. Chronic left-sided low back pain with sciatica, sciatica laterality unspecified  Continue follow up with orthopedics no new radicular symptoms or neuro deficits on exam

## 2019-01-30 NOTE — TELEPHONE ENCOUNTER
----- Message from Linda Golden sent at 1/30/2019 10:13 AM CST -----  Contact: Pt in person  Pt needs to have yearly mammo. Please call once orders are in. [phone]

## 2019-01-31 LAB
ESTIMATED AVG GLUCOSE: 143 MG/DL
HBA1C MFR BLD HPLC: 6.6 %

## 2019-02-01 ENCOUNTER — CLINICAL SUPPORT (OUTPATIENT)
Dept: REHABILITATION | Facility: HOSPITAL | Age: 49
End: 2019-02-01
Attending: INTERNAL MEDICINE
Payer: MEDICAID

## 2019-02-01 DIAGNOSIS — M62.81 WEAKNESS OF TRUNK MUSCULATURE: ICD-10-CM

## 2019-02-01 DIAGNOSIS — M53.86 DECREASED ROM OF LUMBAR SPINE: ICD-10-CM

## 2019-02-01 DIAGNOSIS — R29.898 WEAKNESS OF LEFT LOWER EXTREMITY: ICD-10-CM

## 2019-02-01 DIAGNOSIS — M54.40 CHRONIC LEFT-SIDED LOW BACK PAIN WITH SCIATICA, SCIATICA LATERALITY UNSPECIFIED: Primary | ICD-10-CM

## 2019-02-01 DIAGNOSIS — G89.29 CHRONIC LEFT-SIDED LOW BACK PAIN WITH SCIATICA, SCIATICA LATERALITY UNSPECIFIED: Primary | ICD-10-CM

## 2019-02-01 PROCEDURE — 97110 THERAPEUTIC EXERCISES: CPT | Mod: PN

## 2019-02-01 PROCEDURE — 97161 PT EVAL LOW COMPLEX 20 MIN: CPT | Mod: PN

## 2019-02-01 NOTE — PLAN OF CARE
[facility] - PHYSICAL THERAPY EVALUATION     Name: Audrey Bronson [facility]  Clinic Number: [id number]    Audrey is a 48 y.o. female evaluated on 02/01/2019.   Time In: 7:00am  Time out: 8:00a    Diagnosis:   Encounter Diagnoses   Name Primary?    Chronic left-sided low back pain with sciatica, sciatica laterality unspecified Yes    Weakness of trunk musculature     Weakness of left lower extremity     Decreased ROM of lumbar spine      Physician: Donaldo Pena MD  Treatment Orders: PT Eval and Treat    Past Medical History:   Diagnosis Date    ADHD (attention deficit hyperactivity disorder)     Arthritis     Asthma     Bipolar 1 disorder     Cataract     COPD (chronic obstructive pulmonary disease)     Coronary artery disease     A fib    Depression     bipolar manic depresson    Diabetes mellitus     DVT of lower extremity, bilateral July 2013    bilateral LE DVT. [place]ley filter placed.     Encounter for blood transfusion     History of blood clots 1. Left Leg=2003; 2.Bilateral Groin=Blood Clots= 5 or 6/ 2013 & 7/2013; 3. LLL of Lung=7/2013;  4. Lt. Lower Leg=7/2013.     Pt. had 1st Blood Clot after [id number]=1416, & Last=2013. Estelita Filter= Rt.Lateral Neck.    HTN (hypertension) 6/6/2013    Pt states that she does not have hypertension    Hypercholesteremia     Irregular heartbeat     Neuromuscular disorder     neuropathy feet    PE (pulmonary embolism) July 2013     bilat LE DVT.     Restless leg syndrome      Current Outpatient Medications   Medication Sig    acetaminophen (ARTHRITIS PAIN RELIEVER) 650 MG TbSR Take 650 mg by mouth. Pt states taking 2 -3 times a day    albuterol (VENTOLIN HFA) 90 mcg/actuation inhaler INHALE 2 PUFFS BY MOUTH INTO THE LUNGS EVERY 6 HOURS AS NEEDED FOR WHEEZING. RESCUE    aspirin (ECOTRIN) 81 MG EC tablet Take 81 mg by mouth once daily.     b complex vitamins tablet Take 1 tablet by mouth once daily.    blood sugar diagnostic Strp To  check BG once daily, to use with insurance preferred meter    blood-glucose meter kit To check BG once daily, to use with insurance preferred meter    carbamazepine (TEGRETOL) 200 mg tablet TK 2 TS PO BID    clonazePAM (KLONOPIN) 1 MG tablet TK 1 T PO 0.50mg BID PRA AND INSOMNIA    cyanocobalamin (VITAMIN B-12) 100 MCG tablet Take 1 tablet (100 mcg total) by mouth once daily.    fish oil-omega-3 fatty acids 300-1,000 mg capsule Take 2 capsules by mouth once daily. Instructed to stop 5-7 days before surgery (8/11/16).    fluticasone (FLONASE ALLERGY RELIEF) 50 mcg/actuation nasal spray 1 spray (50 mcg total) by Each Nare route once daily.    furosemide (LASIX) 20 MG tablet Take 1 tablet (20 mg total) by mouth once daily.    gabapentin (NEURONTIN) 600 MG tablet Take 1 tablet (600 mg total) by mouth 2 (two) times daily.    ibuprofen (ADVIL,MOTRIN) 600 MG tablet TAKE 1 TABLET(600 MG) BY MOUTH EVERY 8 HOURS AS NEEDED FOR PAIN    lancets Misc To check BG once daily, to use with insurance preferred meter    lidocaine (LIDODERM) 5 % Place 1 patch onto the skin once daily. Remove & Discard patch within 12 hours or as directed by MD. May use 4% formulation if more affordable for patient.    lisinopril (PRINIVIL,ZESTRIL) 5 MG tablet Take 1 tablet (5 mg total) by mouth once daily.    loratadine (CLARITIN) 10 mg tablet Take 1 tablet (10 mg total) by mouth once daily.    meloxicam (MOBIC) 7.5 MG tablet Take 1 tablet (7.5 mg total) by mouth once daily.    metFORMIN (GLUCOPHAGE) 1000 MG tablet TAKE 1 TABLET(1000 MG) BY MOUTH TWICE DAILY WITH MEALS    methocarbamol (ROBAXIN) 750 MG Tab TAKE 1 TABLET BY MOUTH EVERY 6 HOURS AS NEEDED    metoprolol tartrate (LOPRESSOR) 25 MG tablet Take 1 tablet (25 mg total) by mouth once daily.    mometasone-formoterol (DULERA) 100-5 mcg/actuation HFAA Inhale 2 puffs into the lungs 2 (two) times daily. Controller    multivitamin (THERAGRAN) per tablet Take 1 tablet by mouth every  "morning.    nicotine polacrilex 2 MG Lozg 1-2 per hour in place of cigarettes maximum of 20 per day.    omeprazole 20 mg TbEC TAKE 2 TABLETS BY MOUTH ONCE DAILY AT 6AM    pravastatin (PRAVACHOL) 40 MG tablet Take 1 tablet (40 mg total) by mouth once daily.    risperidone (RISPERDAL) 3 MG Tab take at bedtime    VYVANSE 40 mg Cap TK ONE C PO QAM     No current facility-administered medications for this visit.      Review of patient's allergies indicates:   Allergen Reactions    Morphine Other (See Comments)     Patient had a psychotic episode after taking Morphine  Agitation, hallucinations    Penicillins Anaphylaxis     itching     Precautions: Fall     Pattern of pain determined: Pattern 1   Visit # authorized: 1  Authorization period: 1/30/2019 - 2/28/2019  Plan of care Expiration: 5/1/2019      HISTORY   History of Present Illness: Pt reports "I have a bad back, I've always had a bad back, and it is just getting worse". She reports she missed her last appointment because she had to help her mom after her knee replacement and she was helping lift her mom's leg and she re-hurt her back. The doctor told her not to lift over 5 lbs, due to 2 HNP (pt reports it is at level 7 and vertebrae number 2) so this is why she thinks she is in so much pain. She states that she has sciatic pain on the left, but last night it felt like she had it on the right as well. She reports that this pain comes and goes when her back "gives out on her". She had to go to emergency room last Thursday because she fell. Pain goes from back and around hip and down the front of the leg to the knee and sometimes to the foot. She felt with exercising it started feeling better.         Diagnostic Tests: From Epic - none for back or trunk this year (last x-ray for back was 11/15/2017)   Findings:  3 views.  Lateral imaging demonstrates adequate alignment of the lumbar spine without significant vertebral body height loss.  Disc space height loss " "is noted at several levels although primarily involving L1-L2 and L5-S1.  Facet joints are well aligned noting lower lumbar facet arthropathy.  AP spinal alignment is appropriate.  The sacral segments are well aligned.  There is an IVC filter.  The sacroiliac joints are grossly intact.  Postsurgical changes project over the left upper quadrant.    Pain Scale: Audrey rates pain on a scale of 0-10 to be 20 at worst; 7 currently; 4 at best using VAS.   Pain location: lower back, pain down front of LLE     Aggravating factors: sit to stand, lifting, bending, difficulty getting in and out of bed   Easing Factors: alternating cold and hot, biofreeze   Disturbed Sleep: yes - can't lay on her left side - needs help to get up in the morning     Pattern of pain questions:  1.  Where is your pain the worst? Lower back and to glutes  2.  Is your pain constant or intermittent? constant  3.  Does bending forward make your typical pain worse? yes  4.  Since the start of your back pain, has there been a change in your bowel or bladder? No, has difficulty making it to the bathroom because can't move as fast to get there   5.  What can't you do now that you use to be able to do? "a lot of things"     Prior Treatment: physical therapy at [facility]   Prior functional status: Prior to this session, difficulty with a lot of things but feels like it is getting worse. "when I was 22 I could do everything on my own"   DME owned/used: walks with a cane sometimes (past two days she hasn't walked with it)   Occupation:  None    Leisure: tv, clean house                      Pts goals:  "I was still doing all the stretches since the last time I came here, so I just want to have decreased pain"     Red Flag Screening:   Cough  Sneeze  Strain: (+)  Bladder/ bowel: (--)  Falls: (+)  Night pain: (+)  Unexplained weight loss: (--)  General health: fractures, surgeries, chronic back pain     OBJECTIVE     Postural examination/scapula alignment: Rounded " shoulder and Head forward  Sitting: forward head and rounded shoulders   Standing: forward trunk lean   Correction of posture: better with lumbar roll   Observation: difficulty performing sit to stand, performed oanh's sign when she was standing up from bending over     MOVEMENT LOSS    ROM Loss   Flexion moderate loss (45)   Extension minimal loss (20)   Side bending Right within functional limits (25)   Side bending Left within functional limits (25)    Rotation Right moderate loss   Rotation Left moderate loss     Lower Extremity Strength  Right LE  Left LE    Hip flexion: 4-/5 Hip flexion: 3-/5   Hip extension:  3/5 Hip extension: 3-/5   Hip abduction: 4-/5 Hip abduction: 4-/5   Hip adduction:  3-/5 Hip adduction:  3-/5   Hip Internal rotation   3+/5 Hip Internal rotation 3/5   Knee Flexion 4-/5 Knee Flexion 4-/5   Knee Extension 4/5 Knee Extension 3/5   Ankle dorsiflexion: 4-/5 Ankle dorsiflexion: 3+/5   Ankle plantarflexion: 3/5 Ankle plantarflexion: 3/5       GAIT:  Assistive Device used: none  Level of Assistance: independent  Patient displays the following gait deviations:  decreased weight shift.     Special Tests:   Test Name  Test Result   Prone Instability Test (--)   SI Joint Provocation Test (+)   Straight Leg Raise (+) (L)   Neural Tension Test (+)    Crossed Straight Leg Raise (--)   Walking on toes (+)   Walking on heels  (+)     Femoral nerve tension test: positive on L     NEUROLOGICAL SCREENING     Sensory deficit: intact BLE     Reflexes:    Left Right   Patella Tendon 1+ 2+   Achilles Tendon 1+ 1+   Babinski  (--) (--)   Clonus (--) (--)     REPEATED TEST MOVEMENTS:  Repeated Flexion in Standing worse   Repeated Extension in Standing no better   Repeated Flexion in lying no worse   Repeated Extension in lying  better (not completely gone)        STATIC TESTS   Sitting slouched  no worse   Sitting erect no effect   Standing slouched no worse   Standing erect  Better   Lying prone in extension   better   Long sitting   Unable to perform       Baseline Isometric Testing on Med X equipment: Testing administered by PT  Date of testing: will assess next visit   ROM  deg   Max Peak Torque     Min Peak Torque     Flex/Ext Ratio    % below normative data    Counter weight    femur    Seat pad          FOTO: Focus on Therapeutic Outcomes   Category: lumbar   % Impaired: 63%  Current Score  = CL = least 60% but < 80% impaired, limited or restricted  Goal at Discharge Score = CK = at least 40% but < 60% impaired, limited or restricted    Score interpretation is as follows:     TEST SCORE  Modifier  Impairment Limitation Restriction    0/50  CH  0 % impaired, limited or restricted   1-9/50  CI  @ least 1% but less than 20% impaired, limited or restricted   10-19/50  CJ  @ least 20%<40% impaired, limited or restricted   20-29/50  CK  @ least 40%<60% impaired, limited or restricted   30-39/50  CL  @ least 60% <80% impaired, limited or restricted   40-49/50  CM  @ least 80%<100% impaired limited or restricted   50/50  CN  100% impaired, limited or restricted       Treatment   Time In: 07:50  Time Out: 08:00    PT Evaluation Completed? Yes  Discussed Plan of Care with patient: Yes      Home Exercise Program as follows:   Handouts will be given to the patient at the next visit. Pt demo good understanding of the education provided. Audrey demonstrated fair to good return demonstration of activities.     - Patient received education regarding proper posture and body mechanics.    - Roselia roll tried, recommended, and purchase information was provided.    - Patient received a handout regarding anticipated muscular soreness following the isometric test and strategies for management were reviewed with patient including stretching, using ice and scheduled rest.       Pt was instructed in and performed the following:   Cardiovascular exercise and therapeutic exercise to improve posture, lumbar/cervical ROM, strength, and  muscular endurance as follows:     Audrey received therapeutic exercises to develop/improve posture, lumbar/cervical ROM, strength and muscular endurance for 10 minutes including the following exercises:  DKTC 2x10   LTR 2x10     Assessment   This is a 48 y.o. female referred to [facility] and presents with a medical diagnosis of chronic low back pain with decreased range of motion of intervertebral discs of lumbar spine and demonstrates limitations as described below in the problem list. Pt rehab potential is Fair. Pt presents with decreased lumbar ROM (flexion 45, extension 20, mod to max loss of rotation), pain primarily with lumbar flexion, lifting, sit to stands, positional changes, and laying down. Pt had significant LE weakness bilaterally (L > R). Pt had a positive bridge test with poor core activation, positive neural tension tests (femoral n), tightness in proximal hamstring, and tenderness to palpation over L1-L5 with high muscle guarding. Pt had improved pain with prone press ups. Pt would benefit from [facility] to strengthen lower back muscles, BLE, and trunk muscles. Pt was not given an HEP today due to time limitations, but will be given one on the following visit to ensure home compliance and self management.     Pain Pattern: Pattern 1     Patient received education on the Healthy Back program, purpose of the isometric test, progression of back strengthening as well as wellness approach and systemic strengthening.  Details of the program were discussed.  Reviewed that patient should feel support/pressure from med ex restraints but no pain or discomfort and patient expressed understanding.    Based on the above history and physical examination an active physical therapy program is recommended.  Pt will continue to benefit from skilled outpatient physical therapy to address the deficits listed below in the chart, provide pt/family education and to maximize pt's level of  independence in the home and community environment. .     No environmental, cultural, spiritual, developmental or education needs expressed or noted    Medical necessity is demonstrated by the following problem list.    Pt presents with the following impairments:   History  Co-morbidities and personal factors that may impact the plan of care Examination  Body Structures and Functions, activity limitations and participation restrictions that may impact the plan of care Clinical Presentation   Decision Making/ Complexity Score   Co-morbidities:   COPD/asthma, diabetes, difficulty sleeping, high BMI and HTN        Personal Factors:   lifestyle Body Regions:   back  lower extremities  upper extremities  trunk    Body Systems:   ROM  strength  balance  gait  transitions  motor control    Activity limitations:   Learning and applying knowledge  no deficits    General Tasks and Commands  no deficits    Communication  no deficits    Mobility  lifting and carrying objects  walking  driving (bike, car, motorcycle)    Self care  washing oneself (bathing, drying, washing hands)  caring for body parts (brushing teeth, shaving, grooming)  toileting  dressing    Domestic Life  shopping  cooking  doing house work (cleaning house, washing dishes, laundry)  assisting others    Interactions/Relationships  no deficits    Life Areas  no deficits    Community and Social Life  no deficits    Participation Restrictions:   Min to mod      stable and uncomplicated   Low based on FOTO Score          GOALS: Pt is in agreement with the following goals.    Short term goals:  6 weeks or 10 visits   1.  Pt will demonstrate increased lumbar ROM by at least 5 degrees from the initial ROM value with improvements noted in functional ROM and ability to perform ADLs  2.  Pt will demonstrate increased maximum isometric torque value by % when compared to the initial value resulting in improved ability to perform bending, lifting, and carrying activities  "safely, confidently. - will perform next visit and make goal  3.  Patient report a reduction in worst pain score by 3-4 points for improved tolerance during work and recreational activities  4. Pt to improve BLE by 1/2 MMT to improve functional mobility and participation in ADLs.   5.  Pt able to perform HEP correctly with minimal cueing or supervision for therapist      Long term goals: 13 weeks or 20 visits   1. Pt will demonstrate increased lumbar flexion by at least 10 degrees from initial ROM value, resulting in improved ability to perform functional fwd bending while standing and sitting.   2. Pt will demonstrate increased maximum isometric torque value by % when compared to the initial value resulting in improved ability to perform bending, lifting, and carrying activities safely, confidently. Will assess next visit and make goal  3. Pt to demonstrate ability to independently control and reduce their pain through posture positioning and mechanical movements throughout a typical day.  4. Pt to improve BLE by 1 MMT to improve functional mobility and participation in ADLs.   4.  Patient will demonstrate improved overall function per FOTO Survey to CK = at least 40% but < 60% impaired, limited or restricted score or less.      Plan   Outpatient physical therapy 2x week for 13 weeks or 20 visits to include the following:   - Patient education  - Therapeutic exercise  - Manual therapy  - Performance testing   - Neuromuscular Re-education  - Therapeutic activity   - Modalities  -Functional dry needling     Pt may be seen by PTA as part of the rehabilitation team.     Therapist: Mayur He, PT  2/1/2019    "I certify the need for these services furnished under this plan of treatment and while under my care."    ____________________________________  Physician/Referring Practitioner    _______________  Date of Signature            "

## 2019-02-04 ENCOUNTER — HOSPITAL ENCOUNTER (OUTPATIENT)
Dept: RADIOLOGY | Facility: HOSPITAL | Age: 49
Discharge: HOME | End: 2019-02-04
Attending: INTERNAL MEDICINE
Payer: MEDICAID

## 2019-02-04 ENCOUNTER — OFFICE VISIT (OUTPATIENT)
Dept: OBSTETRICS AND GYNECOLOGY | Facility: CLINIC | Age: 49
End: 2019-02-04
Payer: MEDICAID

## 2019-02-04 VITALS
HEIGHT: 66 IN | DIASTOLIC BLOOD PRESSURE: 70 MMHG | WEIGHT: 216.38 LBS | BODY MASS INDEX: 34.78 KG/M2 | SYSTOLIC BLOOD PRESSURE: 142 MMHG

## 2019-02-04 DIAGNOSIS — Z12.39 SCREENING BREAST EXAMINATION: ICD-10-CM

## 2019-02-04 DIAGNOSIS — Z01.419 WELL WOMAN EXAM WITH ROUTINE GYNECOLOGICAL EXAM: Primary | ICD-10-CM

## 2019-02-04 PROCEDURE — 99396 PR PREVENTIVE VISIT,EST,40-64: ICD-10-PCS | Mod: S$PBB,,, | Performed by: OBSTETRICS & GYNECOLOGY

## 2019-02-04 PROCEDURE — 77067 MAMMO DIGITAL SCREENING BILAT WITH CAD: ICD-10-PCS | Mod: 26,,, | Performed by: RADIOLOGY

## 2019-02-04 PROCEDURE — 99999 PR PBB SHADOW E&M-EST. PATIENT-LVL II: ICD-10-PCS | Mod: PBBFAC,,, | Performed by: OBSTETRICS & GYNECOLOGY

## 2019-02-04 PROCEDURE — 99396 PREV VISIT EST AGE 40-64: CPT | Mod: S$PBB,,, | Performed by: OBSTETRICS & GYNECOLOGY

## 2019-02-04 PROCEDURE — 77067 SCR MAMMO BI INCL CAD: CPT | Mod: 26,,, | Performed by: RADIOLOGY

## 2019-02-04 PROCEDURE — 99212 OFFICE O/P EST SF 10 MIN: CPT | Mod: PBBFAC | Performed by: OBSTETRICS & GYNECOLOGY

## 2019-02-04 PROCEDURE — 77067 SCR MAMMO BI INCL CAD: CPT | Mod: TC

## 2019-02-04 PROCEDURE — 99999 PR PBB SHADOW E&M-EST. PATIENT-LVL II: CPT | Mod: PBBFAC,,, | Performed by: OBSTETRICS & GYNECOLOGY

## 2019-02-04 NOTE — PROGRESS NOTES
"[facility]  Ambulatory Clinic  Obstetrics & Gynecology    Visit Date:  2/4/2019    Chief Complaint:  Annual GYN exam    History of Present Illness:      Audrey Natarajan is a 48 y.o. G0 with h/o bilateral salpingo-oophorectomy here for a gynecologic exam.  Pt has no GYN complaints today.    Pt has h/o BSO secondary to ovarian cyst.  Pt reports an uneventful transition into menopause and is not on hormone replacement therapy.    Last pap 1/2018 normal.  Pt denies active sexually transmitted infections.  Last mammo done today per pt, results pending.  Pt performs monthly self breast examination, smokes tobacco, uses seat belts, and denies abuse.   Pt denies abnormal vaginal bleeding, vaginal discharge, dyspareunia, pelvic pain, bloating, early satiety, unintentional weight loss, breast mass/skin changes, incontinence, GI or urinary complaints.      Past History:  Gynecologic history as noted above.    Review of Systems:      GENERAL:  No fever, fatigue, excessive weight gain or loss  HEENT:  No headaches, hearing changes, visual disturbance  RESPIRATORY:  No cough, shortness of breath  CARDIOVASCULAR:  No chest pain, leg swelling  BREAST:  No lump, pain, nipple discharge, skin changes  GASTROINTESTINAL:  No nausea, vomiting, abd pain, rectal bleeding   GENITOURINARY:  See HPI  HEMATOLOGIC:  No easy bruisability or bleeding   LYMPHATICS:  No enlarged nodes  PSYCHIATRIC:  No homicidal/suicidal ideations    Physical Exam:     BP (!) 142/70   Ht 5' 6" (1.676 m)   Wt 98.1 kg (216 lb 6.1 oz)   LMP 11/01/2011 (LMP Unknown) Comment: stated when she was 39  BMI 34.92 kg/m²      GENERAL:  No acute distress, well-nourished  HEENT:  Atraumatic, anicteric, moist mucus membranes, neck supple  BREAST:  Symmetric, nontender, no obvious masses, adenopathy, skin changes or nipple discharge.  LUNGS:  Clear to auscultation  HEART:  Regular rate and rhythm  ABDOMEN:  Soft, non-tender, non-distended, " normoactive bowel sounds, no obvious organomegaly  EXT:  Symmetric. +2 distal pulses.  SKIN:  No rashes or bruising  PSYCH:  Mood and affect appropriate    GENITOURINARY:    VULVAR:  Female external genitalia w/o obvious lesions. Normal urethral meatus. No gross lymphadenopathy.   VAGINA:  Moist, well-rugated. Adequate support. No obvious lesion. No discharge.  CERVIX:  No cervical motion tenderness, discharge, or obvious lesions.   UTERUS:  Small, non-tender, normal contour  ADNEXA:  Surgically absent.   RECTAL:  Declined. No obvious external lesions    Chaperone present for exam.    Assessment:     48 y.o. G0 with h/o bilateral salpingo-oophorectomy:    1. Routine gynecologic exam    Plan:    A gynecologic health assessment was performed with age appropriate counseling.  Cervical cancer screening - pap up to date.  Screening mammogram up to date.  Encourage healthy lifestyle modifications, monthly self breast exams, Ca/Vit D, postmenopausal bleeding precautions.  Encourage tobacco cessation.  F/u with PCP for health maintenance.  F/u 1 year for GYN exam, or sooner as needed.    Pt voiced understanding.        Yuri Arredondo MD

## 2019-02-07 DIAGNOSIS — M54.50 CHRONIC LEFT-SIDED LOW BACK PAIN WITHOUT SCIATICA: ICD-10-CM

## 2019-02-07 DIAGNOSIS — G89.29 CHRONIC LEFT-SIDED LOW BACK PAIN WITHOUT SCIATICA: ICD-10-CM

## 2019-02-07 RX ORDER — IBUPROFEN 600 MG/1
TABLET ORAL
Qty: 60 TABLET | Refills: 2 | Status: SHIPPED | OUTPATIENT
Start: 2019-02-07 | End: 2019-04-05 | Stop reason: SDUPTHER

## 2019-02-12 ENCOUNTER — CLINICAL SUPPORT (OUTPATIENT)
Dept: REHABILITATION | Facility: HOSPITAL | Age: 49
End: 2019-02-12
Attending: INTERNAL MEDICINE
Payer: MEDICAID

## 2019-02-12 DIAGNOSIS — M53.86 DECREASED ROM OF LUMBAR SPINE: ICD-10-CM

## 2019-02-12 DIAGNOSIS — R29.898 WEAKNESS OF LEFT LOWER EXTREMITY: ICD-10-CM

## 2019-02-12 PROCEDURE — 97110 THERAPEUTIC EXERCISES: CPT | Mod: PN

## 2019-02-12 NOTE — PROGRESS NOTES
"  [facility]      Name: Audrey Natarajan  Clinic Number: [id number]    Date of Treatment: 2019     Diagnosis:   Encounter Diagnoses   Name Primary?    Weakness of left lower extremity     Decreased ROM of lumbar spine      Physician: Donaldo Pena MD    Precautions: Fall     Pattern of pain determined: Pattern 1     Evaluation: 19  Authorization period: 2019 - 2019  Plan of care Expiration: 2019  Reassessment Due: 3/1/19  Visit # / Visits authorized:     Time In: 1030 am  Time Out: 1125 am  Total Billable Time: 45 minutes     Subjective   Audrey reports she did well after the evaluation.  Then she had to help her mother after a recent surgery and that aggravated her back.  Reports bending forwards aggravated her back.       Patient reports tolerating previous visit well.  No increase in symptoms.  Patient reports their pain to be 6/10 on a 0-10 scale with 0 being no pain and 10 being the worst pain imaginable.  Pain Location: low back and L hip     Occupation:  None    Leisure: tv, clean house                      Pts goals:  "I was still doing all the stretches since the last time I came here, so I just want to have decreased pain"     Objective     Baseline IM Testing Results:   Date of testin19  ROM 42 deg   Max Peak Torque 161    Min Peak Torque 49    Flex/Ext Ratio 3.29   % below normative data 39     Outcomes:  Initial score:  least 60% but < 80% impaired, limited or restricted  Visit 5 score:  Goal: at least 40% but < 60% impaired, limited or restricted      Treatment    Pt was instructed in and performed the following:     Audrey received therapeutic exercises to develop/improved posture, cardiovascular endurance, muscular endurance, lumbar/cervical ROM, strength and muscular endurance for 45 minutes including the following exercises:   DKTC 10x 10"  LTR  x15 B  HSS with strap 30"x 3  +TrA with SLR  +clams with RTB    BOLDED " EXERCISES WERE PERFORMED THIS VISIT    [facility] 2/12/2019   Visit Number 2   VAS Pain Rating 6   Treadmill Time (in min.) 10   Speed 1   Incline 0   Flexion in Lying 15   Lumbar Extension Seat Pad 1   Femur Restraint 5   Top Dead Center 24   Counterweight 238   Lumbar Flexion 42   Lumbar Extension 0   Lumbar Peak Torque 161   Min Torque 49   Test Percent Below Normative Data 39   Lumbar Weight 45   Repetitions 0   Heat - Z Lie (in min.) 10       Peripheral muscle strengthening which included 1 set of 15-20 repetitions at a slow, controlled 7 second per rep pace focused on strengthening supporting musculature for improved body mechanics and functional mobility.  Pt and therapist focused on proper form during treatment to ensure optimal strengthening of each targeted muscle group.  Machines were utilized including torso rotation, leg extension, leg curl, chest press, upright row. Tricep extension, bicep curl, leg press, and hip abduction added visit 3    Audrey received the following manual therapy techniques: 0 minutes       Home Exercise Program as follows:   Pt demo good understanding of the education provided. Audrey demonstrated good return demonstration of activities.   Lumbar roll use compliance: NA  Additional exercises taught this treatment session: none    Assessment     Patient was tested on the medX extension machine and demonstrates she is 39% below average for her lumbar extensor strength as compared to her age norm.  Her lumbar ROM is limited to 0-42 degrees which is quite limited.  She reports continued LBP with L hip pain which will benefit from progression of her core and peripheral strength to provide better stability through her functional movements.  She will require encouragement for adherence to her HEP and program.      Patient is making fair progress towards established goals.  Pt will continue to benefit from skilled outpatient physical therapy to address the deficits stated in the  impairment chart, provide pt/family education and to maximize pt's level of independence in the home and community environment.       Pt's spiritual, cultural and educational needs considered and pt agreeable to plan of care and goals as stated below:     Medical necessity is demonstrated by the following problem list.    Pt presents with the following impairments:           History  Co-morbidities and personal factors that may impact the plan of care Examination  Body Structures and Functions, activity limitations and participation restrictions that may impact the plan of care Clinical Presentation    Decision Making/ Complexity Score    Co-morbidities:   COPD/asthma, diabetes, difficulty sleeping, high BMI and HTN           Personal Factors:   lifestyle Body Regions:   back  lower extremities  upper extremities  trunk     Body Systems:   ROM  strength  balance  gait  transitions  motor control     Activity limitations:   Learning and applying knowledge  no deficits     General Tasks and Commands  no deficits     Communication  no deficits     Mobility  lifting and carrying objects  walking  driving (bike, car, motorcycle)     Self care  washing oneself (bathing, drying, washing hands)  caring for body parts (brushing teeth, shaving, grooming)  toileting  dressing     Domestic Life  shopping  cooking  doing house work (cleaning house, washing dishes, laundry)  assisting others     Interactions/Relationships  no deficits     Life Areas  no deficits     Community and Social Life  no deficits     Participation Restrictions:   Min to mod        stable and uncomplicated    Low based on FOTO Score             GOALS: Pt is in agreement with the following goals.     Short term goals:  6 weeks or 10 visits   1.  Pt will demonstrate increased lumbar ROM by at least 5 degrees from the initial ROM value with improvements noted in functional ROM and ability to perform ADLs  2.  Pt will demonstrate increased maximum isometric  torque value by % when compared to the initial value resulting in improved ability to perform bending, lifting, and carrying activities safely, confidently. - will perform next visit and make goal  3.  Patient report a reduction in worst pain score by 3-4 points for improved tolerance during work and recreational activities  4. Pt to improve BLE by 1/2 MMT to improve functional mobility and participation in ADLs.   5.  Pt able to perform HEP correctly with minimal cueing or supervision for therapist        Long term goals: 13 weeks or 20 visits   1. Pt will demonstrate increased lumbar flexion by at least 10 degrees from initial ROM value, resulting in improved ability to perform functional fwd bending while standing and sitting.   2. Pt will demonstrate increased maximum isometric torque value by % when compared to the initial value resulting in improved ability to perform bending, lifting, and carrying activities safely, confidently. Will assess next visit and make goal  3. Pt to demonstrate ability to independently control and reduce their pain through posture positioning and mechanical movements throughout a typical day.  4. Pt to improve BLE by 1 MMT to improve functional mobility and participation in ADLs.   4.  Patient will demonstrate improved overall function per FOTO Survey to CK = at least 40% but < 60% impaired, limited or restricted score or less.      Plan   Continue with established Plan of Care towards established PT goals.

## 2019-02-14 ENCOUNTER — CLINICAL SUPPORT (OUTPATIENT)
Dept: REHABILITATION | Facility: HOSPITAL | Age: 49
End: 2019-02-14
Attending: INTERNAL MEDICINE
Payer: MEDICAID

## 2019-02-14 ENCOUNTER — OFFICE VISIT (OUTPATIENT)
Dept: URGENT CARE | Facility: CLINIC | Age: 49
End: 2019-02-14
Payer: MEDICAID

## 2019-02-14 VITALS
BODY MASS INDEX: 34.72 KG/M2 | OXYGEN SATURATION: 98 % | RESPIRATION RATE: 20 BRPM | WEIGHT: 216 LBS | HEIGHT: 66 IN | SYSTOLIC BLOOD PRESSURE: 131 MMHG | HEART RATE: 94 BPM | DIASTOLIC BLOOD PRESSURE: 76 MMHG | TEMPERATURE: 98 F

## 2019-02-14 DIAGNOSIS — S91.209A AVULSION OF TOENAIL, INITIAL ENCOUNTER: ICD-10-CM

## 2019-02-14 DIAGNOSIS — B35.1 TOENAIL FUNGUS: ICD-10-CM

## 2019-02-14 DIAGNOSIS — R29.898 WEAKNESS OF LEFT LOWER EXTREMITY: ICD-10-CM

## 2019-02-14 DIAGNOSIS — M54.42 ACUTE MIDLINE LOW BACK PAIN WITH LEFT-SIDED SCIATICA: Primary | ICD-10-CM

## 2019-02-14 DIAGNOSIS — J06.9 VIRAL URI: ICD-10-CM

## 2019-02-14 DIAGNOSIS — M53.86 DECREASED ROM OF LUMBAR SPINE: ICD-10-CM

## 2019-02-14 PROCEDURE — 99214 PR OFFICE/OUTPT VISIT, EST, LEVL IV, 30-39 MIN: ICD-10-PCS | Mod: 25,S$GLB,, | Performed by: NURSE PRACTITIONER

## 2019-02-14 PROCEDURE — 99214 OFFICE O/P EST MOD 30 MIN: CPT | Mod: 25,S$GLB,, | Performed by: NURSE PRACTITIONER

## 2019-02-14 PROCEDURE — 97110 THERAPEUTIC EXERCISES: CPT | Mod: PN

## 2019-02-14 RX ORDER — AZELASTINE 1 MG/ML
1 SPRAY, METERED NASAL 2 TIMES DAILY
Qty: 30 ML | Refills: 0 | Status: SHIPPED | OUTPATIENT
Start: 2019-02-14 | End: 2020-02-14

## 2019-02-14 RX ORDER — CYCLOBENZAPRINE HCL 5 MG
5 TABLET ORAL 3 TIMES DAILY PRN
Qty: 30 TABLET | Refills: 0 | Status: SHIPPED | OUTPATIENT
Start: 2019-02-14 | End: 2019-02-24

## 2019-02-14 RX ORDER — DEXAMETHASONE SODIUM PHOSPHATE 100 MG/10ML
5 INJECTION INTRAMUSCULAR; INTRAVENOUS ONCE
Status: COMPLETED | OUTPATIENT
Start: 2019-02-14 | End: 2019-02-14

## 2019-02-14 RX ORDER — DEXAMETHASONE SODIUM PHOSPHATE 100 MG/10ML
5 INJECTION INTRAMUSCULAR; INTRAVENOUS ONCE
Status: DISCONTINUED | OUTPATIENT
Start: 2019-02-14 | End: 2019-02-14

## 2019-02-14 RX ORDER — CETIRIZINE HYDROCHLORIDE, PSEUDOEPHEDRINE HYDROCHLORIDE 5; 120 MG/1; MG/1
1 TABLET, FILM COATED, EXTENDED RELEASE ORAL 2 TIMES DAILY
Qty: 20 TABLET | Refills: 0 | Status: SHIPPED | OUTPATIENT
Start: 2019-02-14 | End: 2019-02-24

## 2019-02-14 RX ADMIN — DEXAMETHASONE SODIUM PHOSPHATE 5 MG: 100 INJECTION INTRAMUSCULAR; INTRAVENOUS at 06:02

## 2019-02-14 NOTE — PROGRESS NOTES
"Subjective:       Patient ID: Audrey Natarajan is a 48 y.o. female.    Vitals:  height is 5' 6" (1.676 m) and weight is 98 kg (216 lb). Her temperature is 98.4 °F (36.9 °C). Her blood pressure is 131/76 and her pulse is 94. Her respiration is 20 and oxygen saturation is 98%.     Chief Complaint: Back Pain; Sinus Problem; and Toe Pain    Pt has been having back pain since January 1st. She was lifting her mother after surgery and ruptured a disk in her back and it it hurting her . She also has a nasal drip that is making her cough a lot and it makes her back hurt worse. Pt toe nail on her right foot is coming off and she thinks its infected.      Back Pain   This is a recurrent problem. The current episode started more than 1 month ago. The problem occurs constantly. The problem is unchanged. The pain is present in the gluteal. The quality of the pain is described as aching. The pain is at a severity of 10/10. The pain is severe. The pain is the same all the time. The symptoms are aggravated by bending, position, coughing, sitting and standing. Pertinent negatives include no chest pain, dysuria, fever, headaches or weakness.   Sinus Problem   This is a new problem. The current episode started yesterday. The problem is unchanged. There has been no fever. She is experiencing no pain. Associated symptoms include congestion. Pertinent negatives include no chills, coughing, headaches, shortness of breath or sore throat. Past treatments include nothing.   Toe Pain    The incident occurred more than 1 week ago. There was no injury mechanism. The pain is present in the right toes. The quality of the pain is described as aching.       Constitution: Negative for chills, fatigue and fever.   HENT: Positive for congestion. Negative for sore throat.    Neck: Negative for painful lymph nodes.   Cardiovascular: Negative for chest pain and leg swelling.   Eyes: Negative for double vision and blurred vision.   Respiratory: " Negative for cough and shortness of breath.    Gastrointestinal: Negative for nausea, vomiting and diarrhea.   Genitourinary: Negative for dysuria, frequency, urgency and history of kidney stones.   Musculoskeletal: Positive for back pain. Negative for joint pain, joint swelling, muscle cramps and muscle ache.   Skin: Negative for color change, pale, rash, erythema and bruising.   Allergic/Immunologic: Negative for seasonal allergies.   Neurological: Negative for dizziness, history of vertigo, light-headedness, passing out and headaches.   Hematologic/Lymphatic: Negative for swollen lymph nodes.   Psychiatric/Behavioral: Negative for nervous/anxious, sleep disturbance and depression. The patient is not nervous/anxious.        Objective:      Physical Exam   Constitutional: She is oriented to person, place, and time. Vital signs are normal. She appears well-developed and well-nourished. She is active and cooperative.  Non-toxic appearance. She does not appear ill. No distress.   HENT:   Head: Normocephalic and atraumatic. Head is without abrasion, without contusion and without laceration.   Right Ear: Hearing, external ear and ear canal normal. A middle ear effusion is present.   Left Ear: Hearing, external ear and ear canal normal. A middle ear effusion is present.   Nose: Rhinorrhea present. No mucosal edema or nasal deformity. No epistaxis. Right sinus exhibits no maxillary sinus tenderness and no frontal sinus tenderness. Left sinus exhibits no maxillary sinus tenderness and no frontal sinus tenderness.   Mouth/Throat: Uvula is midline, oropharynx is clear and moist and mucous membranes are normal. No trismus in the jaw. Normal dentition. No uvula swelling. No oropharyngeal exudate, posterior oropharyngeal edema or posterior oropharyngeal erythema.   +cobblestoning     Eyes: Conjunctivae, EOM and lids are normal. Pupils are equal, round, and reactive to light. No scleral icterus.   Sclera clear bilat   Neck:  Trachea normal, normal range of motion, full passive range of motion without pain and phonation normal. Neck supple.   Cardiovascular: Normal rate, regular rhythm, normal heart sounds, intact distal pulses and normal pulses.   Pulses:       Dorsalis pedis pulses are 2+ on the right side, and 2+ on the left side.        Posterior tibial pulses are 2+ on the right side, and 2+ on the left side.   Pulmonary/Chest: Effort normal and breath sounds normal. No stridor. No respiratory distress. She has no decreased breath sounds. She has no wheezes.   Abdominal: Soft. Normal appearance and bowel sounds are normal. She exhibits no distension, no abdominal bruit, no pulsatile midline mass and no mass. There is no tenderness.   Musculoskeletal: Normal range of motion. She exhibits no edema or deformity.        Lumbar back: She exhibits pain and spasm.        Back:    Feet:   Left Foot:   Skin Integrity: Positive for erythema (right great toenail partially avulsed with fungal infeciton).   Neurological: She is alert and oriented to person, place, and time. She has normal strength and normal reflexes. No sensory deficit. She exhibits normal muscle tone. Coordination normal.   Skin: Skin is warm, dry and intact. Capillary refill takes less than 2 seconds. No abrasion, no bruising, no burn, no ecchymosis, no laceration, no lesion and no rash noted. She is not diaphoretic. No erythema. No pallor.   Psychiatric: She has a normal mood and affect. Her speech is normal and behavior is normal. Judgment and thought content normal. Cognition and memory are normal.   Nursing note and vitals reviewed.      Assessment:       1. Acute midline low back pain with left-sided sciatica    2. Viral URI    3. Avulsion of toenail, initial encounter    4. Toenail fungus        Plan:         Acute midline low back pain with left-sided sciatica  -     dexamethasone injection 5 mg  -     cyclobenzaprine (FLEXERIL) 5 MG tablet; Take 1 tablet (5 mg total)  by mouth 3 (three) times daily as needed.  Dispense: 30 tablet; Refill: 0    Viral URI  -     dexamethasone injection 5 mg  -     azelastine (ASTELIN) 137 mcg (0.1 %) nasal spray; 1 spray (137 mcg total) by Nasal route 2 (two) times daily.  Dispense: 30 mL; Refill: 0  -     cetirizine-pseudoephedrine 5-120 mg Tb12; Take 1 tablet by mouth 2 (two) times daily. for 10 days  Dispense: 20 tablet; Refill: 0    Avulsion of toenail, initial encounter    Toenail fungus     Patient struck to follow up with Podiatry for treatment of fungal infection and possible removal of right great toenail.  Patient also instructed to follow up with her spine doctor for back pain.     Patient Instructions       USE FLONASE- OTC AND ASTELIN- RX NASAL SPRAY TWICE A DAY    USE ZYRTEC D TWICE A DAY- CONGESTION    USE FLEXERIL UP TO 3 TIMES A DAY- WILL MAKE YOU DROWSY    FOLLOW UP WITH PODIATRY AND SPINE/ORTHO    Back Care Tips    Caring for your back  These are things you can do to prevent a recurrence of acute back pain and to reduce symptoms from chronic back pain:  · Maintain a healthy weight. If you are overweight, losing weight will help most types of back pain.  · Exercise is an important part of recovery from most types of back pain. The muscles behind and in front of the spine support the back. This means strengthening both the back muscles and the abdominal muscles will provide better support for your spine.   · Swimming and brisk walking are good overall exercises to improve your fitness level.  · Practice safe lifting methods (below).  · Practice good posture when sitting, standing and walking. Avoid prolonged sitting. This puts more stress on the lower back than standing or walking.  · Wear quality shoes with sufficient arch support. Foot and ankle alignment can affect back symptoms. Women should avoid wearing high heels.  · Therapeutic massage can help relax the back muscles without stretching them.  · During the first 24 to 72  hours after an acute injury or flare-up of chronic back pain, apply an ice pack to the painful area for 20 minutes and then remove it for 20 minutes, over a period of 60 to 90 minutes, or several times a day. As a safety precaution, do not use a heating pad at bedtime. Sleeping on a heating pad can lead to skin burns or tissue damage.  · You can alternate ice and heat therapies.  Medications  Talk to your healthcare provider before using medicines, especially if you have other medical problems or are taking other medicines.  · You may use acetaminophen or ibuprofen to control pain, unless your healthcare provider prescribed other pain medicine. If you have chronic conditions like diabetes, liver or kidney disease, stomach ulcers, or gastrointestinal bleeding, or are taking blood thinners, talk with your healthcare provider before taking any medicines.  · Be careful if you are given prescription pain medicines, narcotics, or medicine for muscle spasm. They can cause drowsiness, affect your coordination, reflexes, and judgment. Do not drive or operate heavy machinery while taking these types of medicines. Take prescription pain medicine only as prescribed by your healthcare provider.  Lumbar stretch  Here is a simple stretching exercise that will help relax muscle spasm and keep your back more limber. If exercise makes your back pain worse, dont do it.  · Lie on your back with your knees bent and both feet on the ground.  · Slowly raise your left knee to your chest as you flatten your lower back against the floor. Hold for 5 seconds.  · Relax and repeat the exercise with your right knee.  · Do 10 of these exercises for each leg.  Safe lifting method  · Dont bend over at the waist to lift an object off the floor.  Instead, bend your knees and hips in a squat.   · Keep your back and head upright  · Hold the object close to your body, directly in front of you.  · Straighten your legs to lift the object.   · Lower the  object to the floor in the reverse fashion.  · If you must slide something across the floor, push it.  Posture tips  Sitting  Sit in chairs with straight backs or low-back support. Keep your knees lower than your hips, with your feet flat on the floor.  When driving, sit up straight. Adjust the seat forward so you are not leaning toward the steering wheel.  A small pillow or rolled towel behind your lower back may help if you are driving long distances.   Standing  When standing for long periods, shift most of your weight to one leg at a time. Alternate legs every few minutes.   Sleeping  The best way to sleep is on your side with your knees bent. Put a low pillow under your head to support your neck in a neutral spine position. Avoid thick pillows that bend your neck to one side. Put a pillow between your legs to further relax your lower back. If you sleep on your back, put pillows under your knees to support your legs in a slightly flexed position. Use a firm mattress. If your mattress sags, replace it, or use a 1/2-inch plywood board under the mattress to add support.  Follow-up care  Follow up with your healthcare provider, or as advised.  If X-rays, a CT scan or an MRI scan were taken, they will be reviewed by a radiologist. You will be notified of any new findings that may affect your care.  Call 911  Seek emergency medical care if any of the following occur:  · Trouble breathing  · Confusion  · Very drowsy  · Fainting or loss of consciousness  · Rapid or very slow heart rate  · Loss of  bowel or bladder control  When to seek medical care  Call your healthcare provider if any of the following occur:  · Pain becomes worse or spreads to your arms or legs  · Weakness or numbness in one or both arms or legs  · Numbness in the groin area  Date Last Reviewed: 6/1/2016  © 9747-9023 [employer]. [address] [place] [address], [place] [address]. All rights reserved. This information is not intended as a  substitute for professional medical care. Always follow your healthcare professional's instructions.        Viral Upper Respiratory Illness (Adult)  You have a viral upper respiratory illness (URI), which is another term for the common cold. This illness is contagious during the first few days. It is spread through the air by coughing and sneezing. It may also be spread by direct contact (touching the sick person and then touching your own eyes, nose, or mouth). Frequent handwashing will decrease risk of spread. Most viral illnesses go away within 7 to 10 days with rest and simple home remedies. Sometimes the illness may last for several weeks. Antibiotics will not kill a virus, and they are generally not prescribed for this condition.    Home care  · If symptoms are severe, rest at home for the first 2 to 3 days. When you resume activity, don't let yourself get too tired.  · Avoid being exposed to cigarette smoke (yours or others).  · You may use acetaminophen or ibuprofen to control pain and fever, unless another medicine was prescribed. (Note: If you have chronic liver or kidney disease, have ever had a stomach ulcer or gastrointestinal bleeding, or are taking blood-thinning medicines, talk with your healthcare provider before using these medicines.) Aspirin should never be given to anyone under 18 years of age who is ill with a viral infection or fever. It may cause severe liver or brain damage.  · Your appetite may be poor, so a light diet is fine. Avoid dehydration by drinking 6 to 8 glasses of fluids per day (water, soft drinks, juices, tea, or soup). Extra fluids will help loosen secretions in the nose and lungs.  · Over-the-counter cold medicines will not shorten the length of time youre sick, but they may be helpful for the following symptoms: cough, sore throat, and nasal and sinus congestion. (Note: Do not use decongestants if you have high blood pressure.)  Follow-up care  Follow up with your  healthcare provider, or as advised.  When to seek medical advice  Call your healthcare provider right away if any of these occur:  · Cough with lots of colored sputum (mucus)  · Severe headache; face, neck, or ear pain  · Difficulty swallowing due to throat pain  · Fever of 100.4°F (38°C)  Call 911, or get immediate medical care  Call emergency services right away if any of these occur:  · Chest pain, shortness of breath, wheezing, or difficulty breathing  · Coughing up blood  · Inability to swallow due to throat pain  Date Last Reviewed: 9/13/2015 © 2000-2017 [employer]. [address] [place] [id number]. All rights reserved. This information is not intended as a substitute for professional medical care. Always follow your healthcare professional's instructions.        Nail Fungal Infection  A nail fungal infection changes the way fingernails and toenails look. They may thicken, discolor, change shape, or split. This condition is hard to treat because nails grow slowly and have limited blood supply. The infection often comes back after treatment.  There are 2 types of medicines used to treat this condition:  · Topical anti-fungal medicines. These are applied to the surface of the skin and nail area. These medicines are not very effective because they cant get deep into the nail.  · Oral antifungal medicines. These medicines work better because they go into the nail from the inside out. But the infection may still come back. It may take 9 to 12 months for your nail to look normal again. This means you are cured. You can repeat treatment if needed. Most people take these medicines without any problems. It is rare to stop therapy because of side effects. But your healthcare provider may give you some monitoring tests. Talk about possible side effects with your provider before starting treatment.  If medicines fail, the nail can be removed surgically or chemically. These methods physically  remove the fungus from the body. This helps medical treatment be more effective.  Home care  · Use medicines exactly as directed for as long as directed. Treating a fungal infection can take longer than other kinds of infections.  · Smoking is a risk factor for fungal infection. This is one more reason to quit.  · Wear absorbent socks, and shoes that let your feet breathe. Sweaty feet increase your risk of fungal infection. They also make an existing infection harder to treat.  · Use footwear when in damp public places like swimming pools, gyms, and shower rooms. This will help you avoid the fungus that grows there.  · Don't share nail clippers or scissors with others.  Follow-up care  Follow up with your healthcare provider, or as advised.  When to seek medical advice  Call your healthcare provider right away if any of these occur:  · Skin by the nail becomes red, swollen, painful, or drains pus (a creamy yellow or white liquid)  · Side effects from oral anti-fungal medicines  Date Last Reviewed: 8/1/2016 © 2000-2017 [employer]. [address] [place] [address]. All rights reserved. This information is not intended as a substitute for professional medical care. Always follow your healthcare professional's instructions.        Nail Fungal Infection  A nail fungal infection changes the way fingernails and toenails look. They may thicken, discolor, change shape, or split. This condition is hard to treat because nails grow slowly and have limited blood supply. The infection often comes back after treatment.  There are 2 types of medicines used to treat this condition:  · Topical anti-fungal medicines. These are applied to the surface of the skin and nail area. These medicines are not very effective because they cant get deep into the nail.  · Oral antifungal medicines. These medicines work better because they go into the nail from the inside out. But the infection may still come back. It may  take 9 to 12 months for your nail to look normal again. This means you are cured. You can repeat treatment if needed. Most people take these medicines without any problems. It is rare to stop therapy because of side effects. But your healthcare provider may give you some monitoring tests. Talk about possible side effects with your provider before starting treatment.  If medicines fail, the nail can be removed surgically or chemically. These methods physically remove the fungus from the body. This helps medical treatment be more effective.  Home care  · Use medicines exactly as directed for as long as directed. Treating a fungal infection can take longer than other kinds of infections.  · Smoking is a risk factor for fungal infection. This is one more reason to quit.  · Wear absorbent socks, and shoes that let your feet breathe. Sweaty feet increase your risk of fungal infection. They also make an existing infection harder to treat.  · Use footwear when in damp public places like swimming pools, gyms, and shower rooms. This will help you avoid the fungus that grows there.  · Don't share nail clippers or scissors with others.  Follow-up care  Follow up with your healthcare provider, or as advised.  When to seek medical advice  Call your healthcare provider right away if any of these occur:  · Skin by the nail becomes red, swollen, painful, or drains pus (a creamy yellow or white liquid)  · Side effects from oral anti-fungal medicines  Date Last Reviewed: 8/1/2016  © 3727-2039 [employer]. [address] [place] [address], [place] [address]. All rights reserved. This information is not intended as a substitute for professional medical care. Always follow your healthcare professional's instructions.

## 2019-02-14 NOTE — PROGRESS NOTES
"  [facility]      Name: Audrey Bronson huey  Clinic Number: [id number]    Date of Treatment: 2019     Diagnosis:   Encounter Diagnoses   Name Primary?    Weakness of left lower extremity     Decreased ROM of lumbar spine      Physician: Donaldo Pena MD    Precautions: Fall     Pattern of pain determined: Pattern 1     Evaluation: 19  Authorization period: 2019 - 2019  Plan of care Expiration: 2019  Reassessment Due: 3/1/19  Visit # / Visits authorized: 3/24    Time In: 7:11a  Time Out:  8:09 am  Total Billable Time: 48 minutes     Subjective   Audrey reports she is in some pain because she didn't sleep well the past two nights, she also has been having to squat to bend down rather than bending over. She states she has pain all the way down to her tailbone that goes around her hip and down to knee and top of her foot.    Patient reports tolerating previous visit well.  No increase in symptoms.  Patient reports their pain to be 6/10 on a 0-10 scale with 0 being no pain and 10 being the worst pain imaginable.  Pain Location: low back and L hip     Occupation:  None    Leisure: tv, clean house                      Pts goals:  "I was still doing all the stretches since the last time I came here, so I just want to have decreased pain"     Objective     Baseline IM Testing Results:   Date of testin19  ROM 42 deg   Max Peak Torque 161    Min Peak Torque 49    Flex/Ext Ratio 3.29   % below normative data 39     Outcomes:  Initial score:  least 60% but < 80% impaired, limited or restricted  Visit 5 score:  Goal: at least 40% but < 60% impaired, limited or restricted      Treatment    Pt was instructed in and performed the following:     Audrey received therapeutic exercises to develop/improved posture, cardiovascular endurance, muscular endurance, lumbar/cervical ROM, strength and muscular endurance for 45 minutes including the following exercises: " "    DKTC 10x 10"  LTR  x15 B  HSS with strap 30"x 3  TrA with SLR x10 each  clams with YTBx20 each    BOLDED EXERCISES WERE PERFORMED THIS VISIT    [facility] 2/14/2019   Visit Number 3   VAS Pain Rating 6   Treadmill Time (in min.) 10   Speed 1   Incline 0   Flexion in Lying -   Lumbar Extension Seat Pad -   Femur Restraint -   Top Dead Center -   Counterweight -   Lumbar Flexion -   Lumbar Extension -   Lumbar Peak Torque -   Min Torque -   Test Percent Below Normative Data -   Lumbar Weight 45   Repetitions 15   Rating of Perceived Exertion 3   Ice - Z Lie (in min.) 10       Peripheral muscle strengthening which included 1 set of 15-20 repetitions at a slow, controlled 7 second per rep pace focused on strengthening supporting musculature for improved body mechanics and functional mobility.  Pt and therapist focused on proper form during treatment to ensure optimal strengthening of each targeted muscle group.  Machines were utilized including torso rotation, leg extension, leg curl, chest press, upright row. Tricep extension, bicep curl, leg press, and hip abduction added visit 3    Audrey received the following manual therapy techniques: 0 minutes       Home Exercise Program as follows:   Pt demo good understanding of the education provided. Audrey demonstrated good return demonstration of activities.   Lumbar roll use compliance: NA  Additional exercises taught this treatment session: none    Assessment     Pt tolerated session well. Pt performed lumbar extension on MedX machine with 45 lbs, 20 reps, with an RPE of 3. She was unable to perform peripheral strengthening on machines today due to time limitations. Pt requested ice instead due to increased pain on her tailbone and lower back. Pt would greatly benefit from increased LE and trunk strengthening, endurance and core activation and facilitation during functional movements. Clamshells exercise demonstrated to increase "hip" pain due to increased " pressure with sidelying, will modify next treatment as appropriate. She would also benefit from further peripheral muscle strengthening on machines. She is appropriate to continue with therapy as planned. She will continue to require encouragement for adherence to her HEP and program.      Patient is making fair progress towards established goals.  Pt will continue to benefit from skilled outpatient physical therapy to address the deficits stated in the impairment chart, provide pt/family education and to maximize pt's level of independence in the home and community environment.       Pt's spiritual, cultural and educational needs considered and pt agreeable to plan of care and goals as stated below:     Medical necessity is demonstrated by the following problem list.    Pt presents with the following impairments:           History  Co-morbidities and personal factors that may impact the plan of care Examination  Body Structures and Functions, activity limitations and participation restrictions that may impact the plan of care Clinical Presentation    Decision Making/ Complexity Score    Co-morbidities:   COPD/asthma, diabetes, difficulty sleeping, high BMI and HTN           Personal Factors:   lifestyle Body Regions:   back  lower extremities  upper extremities  trunk     Body Systems:   ROM  strength  balance  gait  transitions  motor control     Activity limitations:   Learning and applying knowledge  no deficits     General Tasks and Commands  no deficits     Communication  no deficits     Mobility  lifting and carrying objects  walking  driving (bike, car, motorcycle)     Self care  washing oneself (bathing, drying, washing hands)  caring for body parts (brushing teeth, shaving, grooming)  toileting  dressing     Domestic Life  shopping  cooking  doing house work (cleaning house, washing dishes, laundry)  assisting others     Interactions/Relationships  no deficits     Life Areas  no deficits     Community and  Social Life  no deficits     Participation Restrictions:   Min to mod        stable and uncomplicated    Low based on FOTO Score             GOALS: Pt is in agreement with the following goals.     Short term goals:  6 weeks or 10 visits   1.  Pt will demonstrate increased lumbar ROM by at least 5 degrees from the initial ROM value with improvements noted in functional ROM and ability to perform ADLs  2.  Pt will demonstrate increased maximum isometric torque value by % when compared to the initial value resulting in improved ability to perform bending, lifting, and carrying activities safely, confidently. - will perform next visit and make goal  3.  Patient report a reduction in worst pain score by 3-4 points for improved tolerance during work and recreational activities  4. Pt to improve BLE by 1/2 MMT to improve functional mobility and participation in ADLs.   5.  Pt able to perform HEP correctly with minimal cueing or supervision for therapist        Long term goals: 13 weeks or 20 visits   1. Pt will demonstrate increased lumbar flexion by at least 10 degrees from initial ROM value, resulting in improved ability to perform functional fwd bending while standing and sitting.   2. Pt will demonstrate increased maximum isometric torque value by % when compared to the initial value resulting in improved ability to perform bending, lifting, and carrying activities safely, confidently. Will assess next visit and make goal  3. Pt to demonstrate ability to independently control and reduce their pain through posture positioning and mechanical movements throughout a typical day.  4. Pt to improve BLE by 1 MMT to improve functional mobility and participation in ADLs.   4.  Patient will demonstrate improved overall function per FOTO Survey to CK = at least 40% but < 60% impaired, limited or restricted score or less.      Plan   Continue with established Plan of Care towards established PT goals.     Mayur Cox, PT &  Karolina Spicer, SPT   2/14/2019

## 2019-02-15 NOTE — PATIENT INSTRUCTIONS
USE FLONASE- OTC AND ASTELIN- RX NASAL SPRAY TWICE A DAY    USE ZYRTEC D TWICE A DAY- CONGESTION    USE FLEXERIL UP TO 3 TIMES A DAY- WILL MAKE YOU DROWSY    FOLLOW UP WITH PODIATRY AND SPINE/ORTHO    Back Care Tips    Caring for your back  These are things you can do to prevent a recurrence of acute back pain and to reduce symptoms from chronic back pain:  · Maintain a healthy weight. If you are overweight, losing weight will help most types of back pain.  · Exercise is an important part of recovery from most types of back pain. The muscles behind and in front of the spine support the back. This means strengthening both the back muscles and the abdominal muscles will provide better support for your spine.   · Swimming and brisk walking are good overall exercises to improve your fitness level.  · Practice safe lifting methods (below).  · Practice good posture when sitting, standing and walking. Avoid prolonged sitting. This puts more stress on the lower back than standing or walking.  · Wear quality shoes with sufficient arch support. Foot and ankle alignment can affect back symptoms. Women should avoid wearing high heels.  · Therapeutic massage can help relax the back muscles without stretching them.  · During the first 24 to 72 hours after an acute injury or flare-up of chronic back pain, apply an ice pack to the painful area for 20 minutes and then remove it for 20 minutes, over a period of 60 to 90 minutes, or several times a day. As a safety precaution, do not use a heating pad at bedtime. Sleeping on a heating pad can lead to skin burns or tissue damage.  · You can alternate ice and heat therapies.  Medications  Talk to your healthcare provider before using medicines, especially if you have other medical problems or are taking other medicines.  · You may use acetaminophen or ibuprofen to control pain, unless your healthcare provider prescribed other pain medicine. If you have chronic conditions like  diabetes, liver or kidney disease, stomach ulcers, or gastrointestinal bleeding, or are taking blood thinners, talk with your healthcare provider before taking any medicines.  · Be careful if you are given prescription pain medicines, narcotics, or medicine for muscle spasm. They can cause drowsiness, affect your coordination, reflexes, and judgment. Do not drive or operate heavy machinery while taking these types of medicines. Take prescription pain medicine only as prescribed by your healthcare provider.  Lumbar stretch  Here is a simple stretching exercise that will help relax muscle spasm and keep your back more limber. If exercise makes your back pain worse, dont do it.  · Lie on your back with your knees bent and both feet on the ground.  · Slowly raise your left knee to your chest as you flatten your lower back against the floor. Hold for 5 seconds.  · Relax and repeat the exercise with your right knee.  · Do 10 of these exercises for each leg.  Safe lifting method  · Dont bend over at the waist to lift an object off the floor.  Instead, bend your knees and hips in a squat.   · Keep your back and head upright  · Hold the object close to your body, directly in front of you.  · Straighten your legs to lift the object.   · Lower the object to the floor in the reverse fashion.  · If you must slide something across the floor, push it.  Posture tips  Sitting  Sit in chairs with straight backs or low-back support. Keep your knees lower than your hips, with your feet flat on the floor.  When driving, sit up straight. Adjust the seat forward so you are not leaning toward the steering wheel.  A small pillow or rolled towel behind your lower back may help if you are driving long distances.   Standing  When standing for long periods, shift most of your weight to one leg at a time. Alternate legs every few minutes.   Sleeping  The best way to sleep is on your side with your knees bent. Put a low pillow under your head  to support your neck in a neutral spine position. Avoid thick pillows that bend your neck to one side. Put a pillow between your legs to further relax your lower back. If you sleep on your back, put pillows under your knees to support your legs in a slightly flexed position. Use a firm mattress. If your mattress sags, replace it, or use a 1/2-inch plywood board under the mattress to add support.  Follow-up care  Follow up with your healthcare provider, or as advised.  If X-rays, a CT scan or an MRI scan were taken, they will be reviewed by a radiologist. You will be notified of any new findings that may affect your care.  Call 911  Seek emergency medical care if any of the following occur:  · Trouble breathing  · Confusion  · Very drowsy  · Fainting or loss of consciousness  · Rapid or very slow heart rate  · Loss of  bowel or bladder control  When to seek medical care  Call your healthcare provider if any of the following occur:  · Pain becomes worse or spreads to your arms or legs  · Weakness or numbness in one or both arms or legs  · Numbness in the groin area  Date Last Reviewed: 6/1/2016  © 7017-9475 [employer]. [address] [place] [address]. All rights reserved. This information is not intended as a substitute for professional medical care. Always follow your healthcare professional's instructions.        Viral Upper Respiratory Illness (Adult)  You have a viral upper respiratory illness (URI), which is another term for the common cold. This illness is contagious during the first few days. It is spread through the air by coughing and sneezing. It may also be spread by direct contact (touching the sick person and then touching your own eyes, nose, or mouth). Frequent handwashing will decrease risk of spread. Most viral illnesses go away within 7 to 10 days with rest and simple home remedies. Sometimes the illness may last for several weeks. Antibiotics will not kill a virus, and they  are generally not prescribed for this condition.    Home care  · If symptoms are severe, rest at home for the first 2 to 3 days. When you resume activity, don't let yourself get too tired.  · Avoid being exposed to cigarette smoke (yours or others).  · You may use acetaminophen or ibuprofen to control pain and fever, unless another medicine was prescribed. (Note: If you have chronic liver or kidney disease, have ever had a stomach ulcer or gastrointestinal bleeding, or are taking blood-thinning medicines, talk with your healthcare provider before using these medicines.) Aspirin should never be given to anyone under 18 years of age who is ill with a viral infection or fever. It may cause severe liver or brain damage.  · Your appetite may be poor, so a light diet is fine. Avoid dehydration by drinking 6 to 8 glasses of fluids per day (water, soft drinks, juices, tea, or soup). Extra fluids will help loosen secretions in the nose and lungs.  · Over-the-counter cold medicines will not shorten the length of time youre sick, but they may be helpful for the following symptoms: cough, sore throat, and nasal and sinus congestion. (Note: Do not use decongestants if you have high blood pressure.)  Follow-up care  Follow up with your healthcare provider, or as advised.  When to seek medical advice  Call your healthcare provider right away if any of these occur:  · Cough with lots of colored sputum (mucus)  · Severe headache; face, neck, or ear pain  · Difficulty swallowing due to throat pain  · Fever of 100.4°F (38°C)  Call 911, or get immediate medical care  Call emergency services right away if any of these occur:  · Chest pain, shortness of breath, wheezing, or difficulty breathing  · Coughing up blood  · Inability to swallow due to throat pain  Date Last Reviewed: 9/13/2015  © 3973-9686 [employer]. [address] [place] [address], [place] [address]. All rights reserved. This information is not intended as a  substitute for professional medical care. Always follow your healthcare professional's instructions.        Nail Fungal Infection  A nail fungal infection changes the way fingernails and toenails look. They may thicken, discolor, change shape, or split. This condition is hard to treat because nails grow slowly and have limited blood supply. The infection often comes back after treatment.  There are 2 types of medicines used to treat this condition:  · Topical anti-fungal medicines. These are applied to the surface of the skin and nail area. These medicines are not very effective because they cant get deep into the nail.  · Oral antifungal medicines. These medicines work better because they go into the nail from the inside out. But the infection may still come back. It may take 9 to 12 months for your nail to look normal again. This means you are cured. You can repeat treatment if needed. Most people take these medicines without any problems. It is rare to stop therapy because of side effects. But your healthcare provider may give you some monitoring tests. Talk about possible side effects with your provider before starting treatment.  If medicines fail, the nail can be removed surgically or chemically. These methods physically remove the fungus from the body. This helps medical treatment be more effective.  Home care  · Use medicines exactly as directed for as long as directed. Treating a fungal infection can take longer than other kinds of infections.  · Smoking is a risk factor for fungal infection. This is one more reason to quit.  · Wear absorbent socks, and shoes that let your feet breathe. Sweaty feet increase your risk of fungal infection. They also make an existing infection harder to treat.  · Use footwear when in damp public places like swimming pools, gyms, and shower rooms. This will help you avoid the fungus that grows there.  · Don't share nail clippers or scissors with others.  Follow-up care  Follow  up with your healthcare provider, or as advised.  When to seek medical advice  Call your healthcare provider right away if any of these occur:  · Skin by the nail becomes red, swollen, painful, or drains pus (a creamy yellow or white liquid)  · Side effects from oral anti-fungal medicines  Date Last Reviewed: 8/1/2016 © 2000-2017 [employer]. [address] [place] [address]. All rights reserved. This information is not intended as a substitute for professional medical care. Always follow your healthcare professional's instructions.        Nail Fungal Infection  A nail fungal infection changes the way fingernails and toenails look. They may thicken, discolor, change shape, or split. This condition is hard to treat because nails grow slowly and have limited blood supply. The infection often comes back after treatment.  There are 2 types of medicines used to treat this condition:  · Topical anti-fungal medicines. These are applied to the surface of the skin and nail area. These medicines are not very effective because they cant get deep into the nail.  · Oral antifungal medicines. These medicines work better because they go into the nail from the inside out. But the infection may still come back. It may take 9 to 12 months for your nail to look normal again. This means you are cured. You can repeat treatment if needed. Most people take these medicines without any problems. It is rare to stop therapy because of side effects. But your healthcare provider may give you some monitoring tests. Talk about possible side effects with your provider before starting treatment.  If medicines fail, the nail can be removed surgically or chemically. These methods physically remove the fungus from the body. This helps medical treatment be more effective.  Home care  · Use medicines exactly as directed for as long as directed. Treating a fungal infection can take longer than other kinds of  infections.  · Smoking is a risk factor for fungal infection. This is one more reason to quit.  · Wear absorbent socks, and shoes that let your feet breathe. Sweaty feet increase your risk of fungal infection. They also make an existing infection harder to treat.  · Use footwear when in damp public places like swimming pools, gyms, and shower rooms. This will help you avoid the fungus that grows there.  · Don't share nail clippers or scissors with others.  Follow-up care  Follow up with your healthcare provider, or as advised.  When to seek medical advice  Call your healthcare provider right away if any of these occur:  · Skin by the nail becomes red, swollen, painful, or drains pus (a creamy yellow or white liquid)  · Side effects from oral anti-fungal medicines  Date Last Reviewed: 8/1/2016  © 6350-1516 [employer]. [address] [place] [address], [place] [address]. All rights reserved. This information is not intended as a substitute for professional medical care. Always follow your healthcare professional's instructions.

## 2019-02-19 ENCOUNTER — CLINICAL SUPPORT (OUTPATIENT)
Dept: REHABILITATION | Facility: HOSPITAL | Age: 49
End: 2019-02-19
Attending: INTERNAL MEDICINE
Payer: MEDICAID

## 2019-02-19 DIAGNOSIS — M53.86 DECREASED ROM OF LUMBAR SPINE: ICD-10-CM

## 2019-02-19 DIAGNOSIS — R29.898 WEAKNESS OF LEFT LOWER EXTREMITY: ICD-10-CM

## 2019-02-19 PROCEDURE — 97110 THERAPEUTIC EXERCISES: CPT | Mod: PN

## 2019-02-20 NOTE — PROGRESS NOTES
"  [facility]      Name: Audrey Natarajan  Clinic Number: [id number]    Date of Treatment: 2019     Diagnosis:   Encounter Diagnoses   Name Primary?    Weakness of left lower extremity     Decreased ROM of lumbar spine      Physician: Donaldo Pena MD    Precautions: Fall     Pattern of pain determined: Pattern 1     Evaluation: 19  Authorization period: 2019 - 2019  Plan of care Expiration: 2019  Reassessment Due: 3/1/19  Visit # / Visits authorized:     Time In: 8:00a  Time Out:  9:10a  Total Billable Time: 60 minutes     Subjective   Audrey reports she is in more pain due to weather. Pt reports last night her lower back pain disturbed her sleep.    Patient reports tolerating previous visit well.  No increase in symptoms.  Patient reports their pain to be 5/10 on a 0-10 scale with 0 being no pain and 10 being the worst pain imaginable.  Pain Location: low back and L hip     Occupation:  None    Leisure: tv, clean house                      Pts goals:  "I was still doing all the stretches since the last time I came here, so I just want to have decreased pain"     Objective     Baseline IM Testing Results:   Date of testin19  ROM 42 deg   Max Peak Torque 161    Min Peak Torque 49    Flex/Ext Ratio 3.29   % below normative data 39     Outcomes:  Initial score:  least 60% but < 80% impaired, limited or restricted  Visit 5 score:  Goal: at least 40% but < 60% impaired, limited or restricted      Treatment    Pt was instructed in and performed the following:     Audrey received therapeutic exercises to develop/improved posture, cardiovascular endurance, muscular endurance, lumbar/cervical ROM, strength and muscular endurance for 60 minutes including the following exercises:     DKTC 10x 10"  LTR  x20 B  HSS with strap 30"x 3  TrA with SLR x10 each  clams with YTBx20 each    BOLDED EXERCISES WERE PERFORMED THIS VISIT    [facility] " 2/21/2019   Visit Number 5   VAS Pain Rating 3   Treadmill Time (in min.) 10   Speed 1.5   Incline 0   Flexion in Lying -   Lumbar Extension Seat Pad -   Femur Restraint -   Top Dead Center -   Counterweight -   Lumbar Flexion -   Lumbar Extension -   Lumbar Peak Torque -   Min Torque -   Test Percent Below Normative Data -   Lumbar Weight 57   Repetitions 20   Rating of Perceived Exertion 3   Ice - Z Lie (in min.) 10       Peripheral muscle strengthening which included 1 set of 15-20 repetitions at a slow, controlled 7 second per rep pace focused on strengthening supporting musculature for improved body mechanics and functional mobility.  Pt and therapist focused on proper form during treatment to ensure optimal strengthening of each targeted muscle group.  Machines were utilized including torso rotation, leg extension, leg curl, chest press, upright row. Tricep extension, bicep curl, leg press, and hip abduction added visit 3    Audrey received the following manual therapy techniques: 0 minutes       Home Exercise Program as follows:   Pt demo good understanding of the education provided. Audrey demonstrated good return demonstration of activities.   Lumbar roll use compliance: NA  Additional exercises taught this treatment session: none    Assessment     Pt tolerated session well. Pt tolerated progression of weights in Medx lumbar extension by 7 ft.lbs Pt performed lumbar extension on MedX machine with 57 lbs, 20 reps, with an RPE of 3. Pt demonstrated increase of difficult today. Pt was able to tolerated increase of weights in all peripheral muscle strengthening. Pt cont to perform stretching for lower back.  V/c's required to perform exercises with moderate pace. She will continue to require encouragement for adherence to her HEP and program.      Patient is making fair progress towards established goals.  Pt will continue to benefit from skilled outpatient physical therapy to address the deficits stated in the  impairment chart, provide pt/family education and to maximize pt's level of independence in the home and community environment.       Pt's spiritual, cultural and educational needs considered and pt agreeable to plan of care and goals as stated below:     Medical necessity is demonstrated by the following problem list.    Pt presents with the following impairments:           History  Co-morbidities and personal factors that may impact the plan of care Examination  Body Structures and Functions, activity limitations and participation restrictions that may impact the plan of care Clinical Presentation    Decision Making/ Complexity Score    Co-morbidities:   COPD/asthma, diabetes, difficulty sleeping, high BMI and HTN           Personal Factors:   lifestyle Body Regions:   back  lower extremities  upper extremities  trunk     Body Systems:   ROM  strength  balance  gait  transitions  motor control     Activity limitations:   Learning and applying knowledge  no deficits     General Tasks and Commands  no deficits     Communication  no deficits     Mobility  lifting and carrying objects  walking  driving (bike, car, motorcycle)     Self care  washing oneself (bathing, drying, washing hands)  caring for body parts (brushing teeth, shaving, grooming)  toileting  dressing     Domestic Life  shopping  cooking  doing house work (cleaning house, washing dishes, laundry)  assisting others     Interactions/Relationships  no deficits     Life Areas  no deficits     Community and Social Life  no deficits     Participation Restrictions:   Min to mod        stable and uncomplicated    Low based on FOTO Score             GOALS: Pt is in agreement with the following goals.     Short term goals:  6 weeks or 10 visits   1.  Pt will demonstrate increased lumbar ROM by at least 5 degrees from the initial ROM value with improvements noted in functional ROM and ability to perform ADLs  2.  Pt will demonstrate increased maximum isometric  torque value by % when compared to the initial value resulting in improved ability to perform bending, lifting, and carrying activities safely, confidently. - will perform next visit and make goal  3.  Patient report a reduction in worst pain score by 3-4 points for improved tolerance during work and recreational activities  4. Pt to improve BLE by 1/2 MMT to improve functional mobility and participation in ADLs.   5.  Pt able to perform HEP correctly with minimal cueing or supervision for therapist        Long term goals: 13 weeks or 20 visits   1. Pt will demonstrate increased lumbar flexion by at least 10 degrees from initial ROM value, resulting in improved ability to perform functional fwd bending while standing and sitting.   2. Pt will demonstrate increased maximum isometric torque value by % when compared to the initial value resulting in improved ability to perform bending, lifting, and carrying activities safely, confidently. Will assess next visit and make goal  3. Pt to demonstrate ability to independently control and reduce their pain through posture positioning and mechanical movements throughout a typical day.  4. Pt to improve BLE by 1 MMT to improve functional mobility and participation in ADLs.   4.  Patient will demonstrate improved overall function per FOTO Survey to CK = at least 40% but < 60% impaired, limited or restricted score or less.      Plan   Continue with established Plan of Care towards established PT goals.     Mayur Cox, PT   2/14/2019

## 2019-02-21 ENCOUNTER — CLINICAL SUPPORT (OUTPATIENT)
Dept: REHABILITATION | Facility: HOSPITAL | Age: 49
End: 2019-02-21
Attending: INTERNAL MEDICINE
Payer: MEDICAID

## 2019-02-21 ENCOUNTER — TELEPHONE (OUTPATIENT)
Dept: FAMILY MEDICINE | Facility: CLINIC | Age: 49
End: 2019-02-21

## 2019-02-21 DIAGNOSIS — M53.86 DECREASED ROM OF LUMBAR SPINE: ICD-10-CM

## 2019-02-21 DIAGNOSIS — J45.909 ASTHMA, UNSPECIFIED ASTHMA SEVERITY, UNSPECIFIED WHETHER COMPLICATED, UNSPECIFIED WHETHER PERSISTENT: Primary | ICD-10-CM

## 2019-02-21 DIAGNOSIS — R29.898 WEAKNESS OF LEFT LOWER EXTREMITY: ICD-10-CM

## 2019-02-21 PROCEDURE — 97110 THERAPEUTIC EXERCISES: CPT | Mod: PN

## 2019-02-21 NOTE — TELEPHONE ENCOUNTER
Cramps likely related to her PT exercises (strengthening of muscles) Potassium was normal three weeks ago. What machine is she referring to? (CPAP or nebulizer)

## 2019-02-21 NOTE — TELEPHONE ENCOUNTER
----- Message from Samir Marin sent at 2/21/2019  3:03 PM CST -----  Contact: Self: [phone]  Pt returning phone call, Please call back.  Thank you

## 2019-02-21 NOTE — TELEPHONE ENCOUNTER
Pt notified of message below. Pt is also requesting something for anxiety be given. She is flying for a trip April 1st and she is afraid to fly. Please advise.

## 2019-02-21 NOTE — TELEPHONE ENCOUNTER
----- Message from Chemo Reese sent at 2/21/2019 10:16 AM CST -----  Contact: Self/[phone]  Patient stated that she's been getting cramps and she thinks it's because of her Potasium  levels. Patient also called to request an Order for a new hose and mask for her breathing machine. Thank you.

## 2019-02-21 NOTE — TELEPHONE ENCOUNTER
Patient requesting  Order for hose and mask for breathing machine .    Patient stated that she's been getting cramps and she thinks it's because of her Potasium  levels.

## 2019-02-22 NOTE — TELEPHONE ENCOUNTER
Spoke to Pt and she will  orders for Nebulizer supplies. She has an appointment with her psychiatrist on March 13th and she will talk to him regarding her anxiety and flying.

## 2019-02-22 NOTE — TELEPHONE ENCOUNTER
Clonazepam prescribed by her psychiatrist should be taken for anxiety. Script for neublizer supplies in my outbox

## 2019-02-25 DIAGNOSIS — J44.9 CHRONIC OBSTRUCTIVE PULMONARY DISEASE, UNSPECIFIED COPD TYPE: Chronic | ICD-10-CM

## 2019-02-25 RX ORDER — ALBUTEROL SULFATE 90 UG/1
AEROSOL, METERED RESPIRATORY (INHALATION)
Qty: 18 G | Refills: 0 | Status: SHIPPED | OUTPATIENT
Start: 2019-02-25 | End: 2019-03-21 | Stop reason: SDUPTHER

## 2019-02-27 ENCOUNTER — OFFICE VISIT (OUTPATIENT)
Dept: FAMILY MEDICINE | Facility: CLINIC | Age: 49
End: 2019-02-27
Payer: MEDICAID

## 2019-02-27 VITALS
DIASTOLIC BLOOD PRESSURE: 70 MMHG | SYSTOLIC BLOOD PRESSURE: 110 MMHG | RESPIRATION RATE: 20 BRPM | WEIGHT: 223.19 LBS | TEMPERATURE: 98 F | BODY MASS INDEX: 35.87 KG/M2 | OXYGEN SATURATION: 96 % | HEART RATE: 107 BPM | HEIGHT: 66 IN

## 2019-02-27 DIAGNOSIS — B35.1 ONYCHOMYCOSIS OF RIGHT GREAT TOE: Primary | ICD-10-CM

## 2019-02-27 PROCEDURE — 99999 PR PBB SHADOW E&M-EST. PATIENT-LVL III: CPT | Mod: PBBFAC,,, | Performed by: NURSE PRACTITIONER

## 2019-02-27 PROCEDURE — 99213 OFFICE O/P EST LOW 20 MIN: CPT | Mod: PBBFAC,PN | Performed by: NURSE PRACTITIONER

## 2019-02-27 PROCEDURE — 99214 OFFICE O/P EST MOD 30 MIN: CPT | Mod: S$PBB,,, | Performed by: NURSE PRACTITIONER

## 2019-02-27 PROCEDURE — 99214 PR OFFICE/OUTPT VISIT, EST, LEVL IV, 30-39 MIN: ICD-10-PCS | Mod: S$PBB,,, | Performed by: NURSE PRACTITIONER

## 2019-02-27 PROCEDURE — 99999 PR PBB SHADOW E&M-EST. PATIENT-LVL III: ICD-10-PCS | Mod: PBBFAC,,, | Performed by: NURSE PRACTITIONER

## 2019-02-27 NOTE — PROGRESS NOTES
Routine Office Visit    Patient Name: Audrey Natarajan    : 1970  MRN: [id number]    Chief Complaint:  Nail Problem    Subjective:  Audrey is a 48 y.o. female who presents today for:    1. Nail problem - patient reports she was seen in Urgent Care 2 weeks ago was diagnosed with a fungal infection and was told to follow up with her primary care provider and Podiatry for initiation of antifungal therapy.  She states her nail started peeling back in January, and a week after she saw a provider in urgent care the nail fell off.  She has been using lotion to her feet 3 times a day which has helped, and she states the itching has improved since the nail fell off.  She is taking a multitude of medications for her bipolar 1 disorder.  She is also requesting refills of Flexeril and codeine syrup today.  Of note this is not why she made the appointment initially and she was 7 min late for her appointment today.     Past Medical History  Past Medical History:   Diagnosis Date    ADHD (attention deficit hyperactivity disorder)     Arthritis     Asthma     Bipolar 1 disorder     Cataract     COPD (chronic obstructive pulmonary disease)     Coronary artery disease     A fib    Depression     bipolar manic depresson    Diabetes mellitus     DVT of lower extremity, bilateral 2013    bilateral LE DVT. Estelita filter placed.     Encounter for blood transfusion     History of blood clots 1. Left Leg=; 2.Bilateral Groin=Blood Clots=  or 2013 & 2013; 3. LLL of Lung=2013;  4. Lt. Lower Leg=2013.     Pt. had 1st Blood Clot after Lprxhccaapal=4466, & Last=. [place] Filter= Rt.Lateral Neck.    HTN (hypertension) 2013    Pt states that she does not have hypertension    Hypercholesteremia     Irregular heartbeat     Neuromuscular disorder     neuropathy feet    PE (pulmonary embolism) 2013     bilat LE DVT.     Restless leg syndrome        Past Surgical History  Past Surgical  "History:   Procedure Laterality Date    ABDOMINAL SURGERY      BILATERAL OOPHORECTOMY Bilateral 2015    BIOPSY, BLADDER  2013    [facility]    CYSTOSCOPY N/A 2013    [facility]    ESOPHAGOGASTRODUODENOSCOPY (EGD) N/A 8/10/2016    [facility] at [facility]    ESOPHAGOGASTRODUODENOSCOPY (EGD) N/A 10/8/2014    Performed by Jarocho Fowler [facility] at [facility] ENDO    FULGURATION, BLADDER  2013    [facility] OR    GASTRECTOMY-SLEEVE-LAPAROSCOPIC N/A 8/10/2016    [facility] at [facility] OR    Green' s filter Right 2012    Right Neck & Tunneled Down.    HERNIA REPAIR      "[place] [employer] [place] [employer] around th [place]ly [place].", pt. states    IRRIGATION, BLADDER N/A 2013    [facility] at [facility] OR    LAPAROSCOPY W/REMOVAL OF INFECTED MESH  2015    [facility] at [facility] OR    LAPAROTOMY-EXPLORATORY Bilateral 2015    [facility] 2ND FLR    OOPHORECTOMY      OVARIAN CYST REMOVAL  3/13/2014    IN REMOVAL OF OVARY/TUBE(S)      REPAIR, HERNIA, VENTRAL, LAPAROSCOPIC N/A 3/14/2014    [facility] OR    XUHCKSAS-YYXWIWHIOQEM-RTROBTDXV (BSO) Bilateral 2015    [facility] OR 2ND FLR    SPLENECTOMY, TOTAL  2003    TONSILLECTOMY      as a child    TYMPANOSTOMY TUBE PLACEMENT      VEIN SURGERY      Lt leg       Family History  Family History   Problem Relation Age of Onset    Hypertension Father     Diabetes Father     Heart disease Father     Cataracts Father     Diabetes Paternal Grandfather     Heart disease Paternal Grandfather     No Known Problems Mother     Ovarian cancer Maternal Grandmother          from this. ? age     No Known Problems Sister     No Known Problems Brother     " No Known Problems Maternal Aunt     No Known Problems Maternal Uncle     No Known Problems Paternal Aunt     No Known Problems Paternal Uncle     No Known Problems Maternal Grandfather     Ovarian cancer Paternal Grandmother     Uterine cancer Neg Hx     Breast cancer Neg Hx     Colon cancer Neg Hx     Amblyopia Neg Hx     Blindness Neg Hx     Cancer Neg Hx     Glaucoma Neg Hx     Macular degeneration Neg Hx     Retinal detachment Neg Hx     Strabismus Neg Hx     Stroke Neg Hx     Thyroid disease Neg Hx        Social History  Social History     Socioeconomic History    Marital status:      Spouse name: Not on file    Number of children: Not on file    Years of education: Not on file    Highest education level: Not on file   Social Needs    Financial resource strain: Not on file    Food insecurity - worry: Not on file    Food insecurity - inability: Not on file    Transportation needs - medical: Not on file    Transportation needs - non-medical: Not on file   Occupational History    Not on file   Tobacco Use    Smoking status: Current Every Day Smoker     Packs/day: 0.50     Years: 25.00     Pack years: 12.50     Types: Cigarettes    Smokeless tobacco: Never Used   Substance and Sexual Activity    Alcohol use: No     Alcohol/week: 0.0 oz    Drug use: No    Sexual activity: Not Currently   Other Topics Concern    Not on file   Social History Narrative    Not on file       Current Medications  Current Outpatient Medications on File Prior to Visit   Medication Sig Dispense Refill    acetaminophen (ARTHRITIS PAIN RELIEVER) 650 MG TbSR Take 650 mg by mouth. Pt states taking 2 -3 times a day      aspirin (ECOTRIN) 81 MG EC tablet Take 81 mg by mouth once daily.       azelastine (ASTELIN) 137 mcg (0.1 %) nasal spray 1 spray (137 mcg total) by Nasal route 2 (two) times daily. 30 mL 0    b complex vitamins tablet Take 1 tablet by mouth once daily. 90 tablet 2    blood sugar  diagnostic Strp To check BG once daily, to use with insurance preferred meter 30 each 5    carbamazepine (TEGRETOL) 200 mg tablet TK 2 TS PO BID  1    clonazePAM (KLONOPIN) 1 MG tablet TK 1 T PO 0.50mg BID PRA AND INSOMNIA  1    cyanocobalamin (VITAMIN B-12) 100 MCG tablet Take 1 tablet (100 mcg total) by mouth once daily. 90 tablet 1    fish oil-omega-3 fatty acids 300-1,000 mg capsule Take 2 capsules by mouth once daily. Instructed to stop 5-7 days before surgery (8/11/16). 60 capsule 5    fluticasone (FLONASE ALLERGY RELIEF) 50 mcg/actuation nasal spray 1 spray (50 mcg total) by Each Nare route once daily. 16 g 3    furosemide (LASIX) 20 MG tablet Take 1 tablet (20 mg total) by mouth once daily. 30 tablet 5    gabapentin (NEURONTIN) 600 MG tablet Take 1 tablet (600 mg total) by mouth 2 (two) times daily. 60 tablet 5    ibuprofen (ADVIL,MOTRIN) 600 MG tablet TAKE 1 TABLET(600 MG) BY MOUTH EVERY 8 HOURS AS NEEDED FOR PAIN 60 tablet 2    lancets Misc To check BG once daily, to use with insurance preferred meter 30 each 2    lisinopril (PRINIVIL,ZESTRIL) 5 MG tablet Take 1 tablet (5 mg total) by mouth once daily. 30 tablet 5    loratadine (CLARITIN) 10 mg tablet Take 1 tablet (10 mg total) by mouth once daily. 30 tablet 5    metFORMIN (GLUCOPHAGE) 1000 MG tablet TAKE 1 TABLET(1000 MG) BY MOUTH TWICE DAILY WITH MEALS 60 tablet 5    methocarbamol (ROBAXIN) 750 MG Tab TAKE 1 TABLET BY MOUTH EVERY 6 HOURS AS NEEDED 60 tablet 2    metoprolol tartrate (LOPRESSOR) 25 MG tablet Take 1 tablet (25 mg total) by mouth once daily. 30 tablet 5    mometasone-formoterol (DULERA) 100-5 mcg/actuation HFAA Inhale 2 puffs into the lungs 2 (two) times daily. Controller 13 g 5    multivitamin (THERAGRAN) per tablet Take 1 tablet by mouth every morning. 90 tablet 2    nicotine polacrilex 2 MG Lozg 1-2 per hour in place of cigarettes maximum of 20 per day. 168 lozenge 0    omeprazole 20 mg TbEC TAKE 2 TABLETS BY MOUTH ONCE  "DAILY AT 6AM 60 each 5    pravastatin (PRAVACHOL) 40 MG tablet Take 1 tablet (40 mg total) by mouth once daily. 30 tablet 5    risperidone (RISPERDAL) 3 MG Tab take at bedtime  1    VENTOLIN HFA 90 mcg/actuation inhaler INHALE 2 PUFFS BY MOUTH INTO THE LUNGS EVERY 6 HOURS AS NEEDED FOR WHEEZING 18 g 0    VYVANSE 40 mg Cap TK ONE C PO QAM  0    blood-glucose meter kit To check BG once daily, to use with insurance preferred meter 1 each 0    lidocaine (LIDODERM) 5 % Place 1 patch onto the skin once daily. Remove & Discard patch within 12 hours or as directed by MD. May use 4% formulation if more affordable for patient. 6 patch 0     No current facility-administered medications on file prior to visit.        Allergies   Review of patient's allergies indicates:   Allergen Reactions    Morphine Other (See Comments)     Patient had a psychotic episode after taking Morphine  Agitation, hallucinations    Penicillins Anaphylaxis     itching       Review of Systems (Pertinent positives)  Review of Systems   Constitutional: Negative for chills, diaphoresis, fever, malaise/fatigue and weight loss.   HENT: Negative.    Eyes: Negative.    Cardiovascular: Negative.    Gastrointestinal: Negative.    Genitourinary: Negative.    Musculoskeletal: Negative.    Skin:        Toenail fungus   Neurological: Negative.  Negative for weakness.   Endo/Heme/Allergies: Negative.    Psychiatric/Behavioral: Negative.        /70 (BP Location: Left arm, Patient Position: Sitting, BP Method: X-Large (Manual))   Pulse 107   Temp 98.2 °F (36.8 °C) (Oral)   Resp 20   Ht 5' 6" (1.676 m)   Wt 101.2 kg (223 lb 3.2 oz)   LMP 11/01/2011 (LMP Unknown) Comment: stated when she was 39  SpO2 96%   BMI 36.03 kg/m²     Physical Exam   Constitutional: She is oriented to person, place, and time. She appears well-developed and well-nourished. No distress.   Eyes: Conjunctivae and EOM are normal. Pupils are equal, round, and reactive to light. "   Neck: Normal range of motion. Neck supple.   Cardiovascular: Normal rate, regular rhythm, normal heart sounds and intact distal pulses. Exam reveals no gallop and no friction rub.   No murmur heard.  Pulmonary/Chest: Effort normal and breath sounds normal. No stridor. No respiratory distress. She has no wheezes. She has no rales. She exhibits no tenderness.   Abdominal: Soft. Bowel sounds are normal. She exhibits no distension and no mass. There is no tenderness. There is no rebound and no guarding.   Musculoskeletal: Normal range of motion.        Feet:    Neurological: She is alert and oriented to person, place, and time.   Skin: Skin is warm and dry. Capillary refill takes less than 2 seconds. She is not diaphoretic.        Assessment/Plan:  Audrey Natarajan is a 48 y.o. female who presents today for :    Audrey was seen today for nail problem.    Diagnoses and all orders for this visit:    Onychomycosis of right great toe  -     ciclopirox-nail lacquer removr 8 % Kit; Apply 1 application topically once a week.    I told the patient that I will not be refilling her for Flexeril nor her codeine syrup and she needs to f/u with whoever prescribed those for refills.  Educated the patient on proper use of the nail lacquer, including using it once per week, leaving it on for a week, removing with alcohol, and then re-applying.  Use nail lacquer until podiatry appointment.  Patient verbalized understanding.  Patient urged to maintain f/u with podiatry.         This office note has been dictated.  This dictation has been generated using M-Modal Fluency Direct dictation; some phonetic errors may occur.   My collaborating physician is Dr. Champ Zhao.

## 2019-03-01 ENCOUNTER — TELEPHONE (OUTPATIENT)
Dept: FAMILY MEDICINE | Facility: CLINIC | Age: 49
End: 2019-03-01

## 2019-03-01 DIAGNOSIS — B35.1 ONYCHOMYCOSIS OF RIGHT GREAT TOE: Primary | ICD-10-CM

## 2019-03-01 RX ORDER — CICLOPIROX OLAMINE 7.7 MG/100ML
SUSPENSION TOPICAL DAILY
Qty: 30 ML | Refills: 1 | Status: SHIPPED | OUTPATIENT
Start: 2019-03-01 | End: 2019-06-22 | Stop reason: SDUPTHER

## 2019-03-01 NOTE — TELEPHONE ENCOUNTER
----- Message from Wanda Mast sent at 3/1/2019  1:11 PM CST -----  Contact: Self  Patient called because listed medication is not covered by her medical insurance. patient stated that the medication is $300. Patient is requesting a substitute. Please call at [phone].        ciclopirox-nail lacquer removr 8 % Kit 1 [facility] [address] [place] [address] EXPY AT [facility]

## 2019-03-01 NOTE — PROGRESS NOTES
Patient contacted in ID confirmed by name and date of birth.  Patient states nail lacquer was too expensive.  Will treat with Loprox solution given that other topical therapies are not on her formulary.  Patient encouraged to follow up with Podiatry as scheduled.

## 2019-03-04 ENCOUNTER — TELEPHONE (OUTPATIENT)
Dept: FAMILY MEDICINE | Facility: CLINIC | Age: 49
End: 2019-03-04

## 2019-03-04 NOTE — TELEPHONE ENCOUNTER
----- Message from Linda Golden sent at 3/1/2019  4:13 PM CST -----  Contact: Pt   Pt needs a cough medication called in to her pharmacy, but doesn't want one with codeine & wants to make sure it's one covered by her insurance.  
LVM for pt to call the office.  
Please see message from pt. She is requesting cough syrup.   
Improved

## 2019-03-04 NOTE — PROGRESS NOTES
"  [facility]      Name: Audrey Natarajan  Clinic Number: [id number]    Date of Treatment: 2019     Diagnosis:   Encounter Diagnoses   Name Primary?    Weakness of left lower extremity     Decreased ROM of lumbar spine      Physician: Donaldo Pena MD    Precautions: Fall     Pattern of pain determined: Pattern 1     Evaluation: 19  Authorization period: 2019 - 2019  Plan of care Expiration: 2019  Reassessment Due: 3/1/19  Visit # / Visits authorized:     Time In: 820 am (20' late)  Time Out:  900 am  Total Billable Time: 15 minutes (1:1 with PT)    Subjective   Audrey reports she is feeling better.  "I had to miss last week because I was sick."     Patient reports tolerating previous visit well.  No increase in symptoms.  Patient reports their pain to be 4/10 on a 0-10 scale with 0 being no pain and 10 being the worst pain imaginable.  Pain Location: low back and L hip     Occupation:  None    Leisure: tv, clean house                      Pts goals:  "I was still doing all the stretches since the last time I came here, so I just want to have decreased pain"     Objective     Baseline IM Testing Results:   Date of testin19  ROM 42 deg   Max Peak Torque 161    Min Peak Torque 49    Flex/Ext Ratio 3.29   % below normative data 39     ROM Reassessed 3/6/19: 0-42 degrees    Outcomes:  Initial score:  least 60% but < 80% impaired, limited or restricted  Visit 5 score:  Goal: at least 40% but < 60% impaired, limited or restricted      Treatment    Pt was instructed in and performed the following:     Audrey received therapeutic exercises to develop/improved posture, cardiovascular endurance, muscular endurance, lumbar/cervical ROM, strength and muscular endurance for 40 minutes including the following exercises:     DKTC 10x 10"  LTR  x20 B  HSS with strap 30"x 3  TrA with SLR x10 each  clams with YTBx20 each    BOLDED EXERCISES WERE " PERFORMED THIS VISIT          Peripheral muscle strengthening which included 1 set of 15-20 repetitions at a slow, controlled 7 second per rep pace focused on strengthening supporting musculature for improved body mechanics and functional mobility.  Pt and therapist focused on proper form during treatment to ensure optimal strengthening of each targeted muscle group.  Machines were utilized including torso rotation, leg extension, leg curl, chest press, upright row. Tricep extension, bicep curl, leg press, and hip abduction added visit 3    Audrey received the following manual therapy techniques: 0 minutes       Home Exercise Program as follows:   Pt demo good understanding of the education provided. Audrey demonstrated good return demonstration of activities.   Lumbar roll use compliance: NA  Additional exercises taught this treatment session: none    Assessment     Pt's session was limited to just the warm-up and medX exercises due to time restraints.   Her ROM was reassessed and remains 0-42 degrees.  She was able to complete her lumbar extension exercises for 22 repetitions at 57 ft/lbs of resistance and rated it as a 3 on the RPE scale.  She will benefit from progression of this exercise to 60 ft/lbs next session.  She continues to progress as anticipated on the peripheral exercises.        Patient is making fair progress towards established goals.  Pt will continue to benefit from skilled outpatient physical therapy to address the deficits stated in the impairment chart, provide pt/family education and to maximize pt's level of independence in the home and community environment.       Pt's spiritual, cultural and educational needs considered and pt agreeable to plan of care and goals as stated below:     Medical necessity is demonstrated by the following problem list.    Pt presents with the following impairments:           History  Co-morbidities and personal factors that may impact the plan of care  Examination  Body Structures and Functions, activity limitations and participation restrictions that may impact the plan of care Clinical Presentation    Decision Making/ Complexity Score    Co-morbidities:   COPD/asthma, diabetes, difficulty sleeping, high BMI and HTN           Personal Factors:   lifestyle Body Regions:   back  lower extremities  upper extremities  trunk     Body Systems:   ROM  strength  balance  gait  transitions  motor control     Activity limitations:   Learning and applying knowledge  no deficits     General Tasks and Commands  no deficits     Communication  no deficits     Mobility  lifting and carrying objects  walking  driving (bike, car, motorcycle)     Self care  washing oneself (bathing, drying, washing hands)  caring for body parts (brushing teeth, shaving, grooming)  toileting  dressing     Domestic Life  shopping  cooking  doing house work (cleaning house, washing dishes, laundry)  assisting others     Interactions/Relationships  no deficits     Life Areas  no deficits     Community and Social Life  no deficits     Participation Restrictions:   Min to mod        stable and uncomplicated    Low based on FOTO Score             GOALS: Pt is in agreement with the following goals.     Short term goals:  6 weeks or 10 visits   1.  Pt will demonstrate increased lumbar ROM by at least 5 degrees from the initial ROM value with improvements noted in functional ROM and ability to perform ADLs  2.  Pt will demonstrate increased maximum isometric torque value by % when compared to the initial value resulting in improved ability to perform bending, lifting, and carrying activities safely, confidently. - will perform next visit and make goal  3.  Patient report a reduction in worst pain score by 3-4 points for improved tolerance during work and recreational activities  4. Pt to improve BLE by 1/2 MMT to improve functional mobility and participation in ADLs.   5.  Pt able to perform HEP correctly  with minimal cueing or supervision for therapist        Long term goals: 13 weeks or 20 visits   1. Pt will demonstrate increased lumbar flexion by at least 10 degrees from initial ROM value, resulting in improved ability to perform functional fwd bending while standing and sitting.   2. Pt will demonstrate increased maximum isometric torque value by % when compared to the initial value resulting in improved ability to perform bending, lifting, and carrying activities safely, confidently. Will assess next visit and make goal  3. Pt to demonstrate ability to independently control and reduce their pain through posture positioning and mechanical movements throughout a typical day.  4. Pt to improve BLE by 1 MMT to improve functional mobility and participation in ADLs.   4.  Patient will demonstrate improved overall function per FOTO Survey to CK = at least 40% but < 60% impaired, limited or restricted score or less.      Plan   Continue with established Plan of Care towards established PT goals.     Mayur Cox, PT   2/14/2019

## 2019-03-06 ENCOUNTER — CLINICAL SUPPORT (OUTPATIENT)
Dept: REHABILITATION | Facility: HOSPITAL | Age: 49
End: 2019-03-06
Attending: INTERNAL MEDICINE
Payer: MEDICAID

## 2019-03-06 ENCOUNTER — TELEPHONE (OUTPATIENT)
Dept: FAMILY MEDICINE | Facility: CLINIC | Age: 49
End: 2019-03-06

## 2019-03-06 DIAGNOSIS — M53.86 DECREASED ROM OF LUMBAR SPINE: ICD-10-CM

## 2019-03-06 DIAGNOSIS — R29.898 WEAKNESS OF LEFT LOWER EXTREMITY: ICD-10-CM

## 2019-03-06 PROCEDURE — 97110 THERAPEUTIC EXERCISES: CPT | Mod: PN

## 2019-03-06 NOTE — TELEPHONE ENCOUNTER
Patient states the DELSYM and ROBITUSSIN is not helping with her cough and is requesting something else be called in. Please advise.

## 2019-03-06 NOTE — TELEPHONE ENCOUNTER
----- Message from Margarita Goodman sent at 3/6/2019 11:43 AM CST -----  Contact: pt  Name of Who is Calling: pt      What is the request in detail: pt returned the nurse's phone call. Call pt      Can the clinic reply by [facility]: no      [facility] Back [facility]: [phone]

## 2019-03-07 NOTE — TELEPHONE ENCOUNTER
Attempted to contact patient, No answer, LVM instructing patient to call the clinic regarding her cough.  See previous message.

## 2019-03-18 ENCOUNTER — CLINICAL SUPPORT (OUTPATIENT)
Dept: REHABILITATION | Facility: HOSPITAL | Age: 49
End: 2019-03-18
Attending: INTERNAL MEDICINE
Payer: MEDICAID

## 2019-03-18 DIAGNOSIS — R29.898 WEAKNESS OF LEFT LOWER EXTREMITY: ICD-10-CM

## 2019-03-18 DIAGNOSIS — M53.86 DECREASED ROM OF LUMBAR SPINE: ICD-10-CM

## 2019-03-18 PROCEDURE — 97110 THERAPEUTIC EXERCISES: CPT | Mod: PN

## 2019-03-18 NOTE — PROGRESS NOTES
"  [facility]      Name: Audrey Natarajan  Clinic Number: [id number]    Date of Treatment: 2019     Diagnosis:   Encounter Diagnoses   Name Primary?    Weakness of left lower extremity     Decreased ROM of lumbar spine      Physician: Donaldo Pena MD    Precautions: Fall     Pattern of pain determined: Pattern 1     Evaluation: 19  Authorization period: 2019 - 2019  Plan of care Expiration: 2019  Reassessment Due: 19  Visit # / Visits authorized:     Time In: 1103  Time Out:  1205  Total Billable Time: 55 minutes (1:1 with PT)    Subjective   Audrey reports pain in low back bilaterally. She has pain down her LLE and feels weak in LLE. I took a half of a meloxicam about an hour before coming here because of my pain.     Patient reports tolerating previous visit well.  No increase in symptoms.  Patient reports their pain to be 5/10 on a 0-10 scale with 0 being no pain and 10 being the worst pain imaginable.  Pain Location: low back and L hip     Occupation:  None    Leisure: tv, clean house                      Pts goals:  "I was still doing all the stretches since the last time I came here, so I just want to have decreased pain"     Objective     Baseline IM Testing Results:   Date of testin19  ROM 42 deg   Max Peak Torque 161    Min Peak Torque 49    Flex/Ext Ratio 3.29   % below normative data 39     ROM Reassessed 3/6/19: 0-42 degrees    Outcomes:  Initial score:  least 60% but < 80% impaired, limited or restricted  Visit 5 score:  Goal: at least 40% but < 60% impaired, limited or restricted      Treatment    Pt was instructed in and performed the following:     Audrey received therapeutic exercises to develop/improved posture, cardiovascular endurance, muscular endurance, lumbar/cervical ROM, strength and muscular endurance for 60 minutes including the following exercises:     DKTC 30x 3"  LTR  x30 B  HSS with strap 30"x 3  TrA " with SLR x10 each  clams with YTBx20 each  Slump stretch 2x20 (tensioner)       BOLDED EXERCISES WERE PERFORMED THIS VISIT    [facility] 3/18/2019   Visit Number 7   VAS Pain Rating 5   Treadmill Time (in min.) 10   Speed 1.6   Incline 0   Flexion in Lying -   Lumbar Extension Seat Pad -   Femur Restraint -   Top Dead Center -   Counterweight -   Lumbar Flexion 42   Lumbar Extension 0   Lumbar Peak Torque -   Min Torque -   Test Percent Below Normative Data -   Lumbar Weight 60   Repetitions 20   Rating of Perceived Exertion 3   Ice - Z Lie (in min.) 0     Peripheral muscle strengthening which included 1 set of 15-20 repetitions at a slow, controlled 7 second per rep pace focused on strengthening supporting musculature for improved body mechanics and functional mobility.  Pt and therapist focused on proper form during treatment to ensure optimal strengthening of each targeted muscle group.  Machines were utilized including torso rotation, leg extension, leg curl, chest press, upright row. Tricep extension, bicep curl, leg press, and hip abduction added visit 3    Audrey received the following manual therapy techniques: 0 minutes       Home Exercise Program as follows:   Pt demo good understanding of the education provided. Audrey demonstrated good return demonstration of activities.   Lumbar roll use compliance: NA  Additional exercises taught this treatment session: none    Assessment     Pt with good tolerance to all exercises. She was able to perform a higher resistance on the MedX machine for 20 reps without increase in low back pain or discomfort.       Patient is making fair progress towards established goals.  Pt will continue to benefit from skilled outpatient physical therapy to address the deficits stated in the impairment chart, provide pt/family education and to maximize pt's level of independence in the home and community environment.       Pt's spiritual, cultural and educational needs  considered and pt agreeable to plan of care and goals as stated below:     Medical necessity is demonstrated by the following problem list.    Pt presents with the following impairments:           History  Co-morbidities and personal factors that may impact the plan of care Examination  Body Structures and Functions, activity limitations and participation restrictions that may impact the plan of care Clinical Presentation    Decision Making/ Complexity Score    Co-morbidities:   COPD/asthma, diabetes, difficulty sleeping, high BMI and HTN           Personal Factors:   lifestyle Body Regions:   back  lower extremities  upper extremities  trunk     Body Systems:   ROM  strength  balance  gait  transitions  motor control     Activity limitations:   Learning and applying knowledge  no deficits     General Tasks and Commands  no deficits     Communication  no deficits     Mobility  lifting and carrying objects  walking  driving (bike, car, motorcycle)     Self care  washing oneself (bathing, drying, washing hands)  caring for body parts (brushing teeth, shaving, grooming)  toileting  dressing     Domestic Life  shopping  cooking  doing house work (cleaning house, washing dishes, laundry)  assisting others     Interactions/Relationships  no deficits     Life Areas  no deficits     Community and Social Life  no deficits     Participation Restrictions:   Min to mod        stable and uncomplicated    Low based on FOTO Score             GOALS: Pt is in agreement with the following goals.     Short term goals:  6 weeks or 10 visits   1.  Pt will demonstrate increased lumbar ROM by at least 5 degrees from the initial ROM value with improvements noted in functional ROM and ability to perform ADLs  2.  Pt will demonstrate increased maximum isometric torque value by % when compared to the initial value resulting in improved ability to perform bending, lifting, and carrying activities safely, confidently. - will perform next visit  and make goal  3.  Patient report a reduction in worst pain score by 3-4 points for improved tolerance during work and recreational activities  4. Pt to improve BLE by 1/2 MMT to improve functional mobility and participation in ADLs.   5.  Pt able to perform HEP correctly with minimal cueing or supervision for therapist        Long term goals: 13 weeks or 20 visits   1. Pt will demonstrate increased lumbar flexion by at least 10 degrees from initial ROM value, resulting in improved ability to perform functional fwd bending while standing and sitting.   2. Pt will demonstrate increased maximum isometric torque value by % when compared to the initial value resulting in improved ability to perform bending, lifting, and carrying activities safely, confidently. Will assess next visit and make goal  3. Pt to demonstrate ability to independently control and reduce their pain through posture positioning and mechanical movements throughout a typical day.  4. Pt to improve BLE by 1 MMT to improve functional mobility and participation in ADLs.   4.  Patient will demonstrate improved overall function per FOTO Survey to CK = at least 40% but < 60% impaired, limited or restricted score or less.      Plan   Continue with established Plan of Care towards established PT goals.   Progress with MedX resistance at next visit.     Herson Giron, PT   3/18/2019

## 2019-03-21 DIAGNOSIS — J44.9 CHRONIC OBSTRUCTIVE PULMONARY DISEASE, UNSPECIFIED COPD TYPE: Chronic | ICD-10-CM

## 2019-03-21 RX ORDER — ALBUTEROL SULFATE 90 UG/1
AEROSOL, METERED RESPIRATORY (INHALATION)
Qty: 18 G | Refills: 0 | Status: SHIPPED | OUTPATIENT
Start: 2019-03-21 | End: 2019-04-12 | Stop reason: SDUPTHER

## 2019-03-22 ENCOUNTER — TELEPHONE (OUTPATIENT)
Dept: NEUROSURGERY | Facility: CLINIC | Age: 49
End: 2019-03-22

## 2019-03-22 NOTE — TELEPHONE ENCOUNTER
----- Message from Kyrie Tinsley sent at 3/21/2019 11:24 AM CDT -----  Contact: SABIHA DUNNE [[id number]]  Name of Who is Calling: SABIHA DUNNE [[id number]]      What is the request in detail: (m) Patient would like to speak with staff in regards to being seen sooner then July. Will not be back into town until April 25. Please advise      Can the clinic reply by [facility]: no      What Number to Call Back [facility]: [phone]

## 2019-04-05 DIAGNOSIS — G89.29 CHRONIC LEFT-SIDED LOW BACK PAIN WITHOUT SCIATICA: ICD-10-CM

## 2019-04-05 DIAGNOSIS — M54.50 CHRONIC LEFT-SIDED LOW BACK PAIN WITHOUT SCIATICA: ICD-10-CM

## 2019-04-05 RX ORDER — IBUPROFEN 600 MG/1
TABLET ORAL
Qty: 60 TABLET | Refills: 0 | Status: SHIPPED | OUTPATIENT
Start: 2019-04-05 | End: 2019-05-21 | Stop reason: ALTCHOICE

## 2019-04-12 ENCOUNTER — TELEPHONE (OUTPATIENT)
Dept: FAMILY MEDICINE | Facility: CLINIC | Age: 49
End: 2019-04-12

## 2019-04-12 DIAGNOSIS — J44.9 CHRONIC OBSTRUCTIVE PULMONARY DISEASE, UNSPECIFIED COPD TYPE: Chronic | ICD-10-CM

## 2019-04-12 RX ORDER — ALBUTEROL SULFATE 90 UG/1
AEROSOL, METERED RESPIRATORY (INHALATION)
Qty: 18 G | Refills: 0 | Status: SHIPPED | OUTPATIENT
Start: 2019-04-12 | End: 2019-05-10 | Stop reason: SDUPTHER

## 2019-04-12 NOTE — TELEPHONE ENCOUNTER
I spoke to the pt and advised her she should be seen at  or the ER. Pt voices understanding. She will call her insurance to see where she is covered to be seen as she is in [place].

## 2019-04-12 NOTE — TELEPHONE ENCOUNTER
Please see message from pt. [place]. Anything we can recommend OTC or should she go be seen. Please advise.

## 2019-04-12 NOTE — TELEPHONE ENCOUNTER
If going on for a week, should be seen by urgent care.  Can try alkaseltzer cold and flu    Thanks  Dr. Oleary

## 2019-04-12 NOTE — TELEPHONE ENCOUNTER
----- Message from Samir Marin sent at 4/12/2019  2:20 PM CDT -----  Contact: Self: [phone]  Type:  Needs Medical Advice    Who Called: Audrey Natarajan    Symptoms (please be specific): Stiff neck, chest mucus, rib cage pain, congestion, sore throat, and ear ache    How long has patient had these symptoms:1 week    Pharmacy name and phone #  [id number] - [place], [place] - [address] AT [facility]  [address]  [address] [address]  Phone: [phone] Fax: [phone]    Would the patient rather a call back or a response via [facility]? Callback    Best Call Back Number: [phone]    Additional Information: N/A

## 2019-04-20 ENCOUNTER — OFFICE VISIT (OUTPATIENT)
Dept: URGENT CARE | Facility: CLINIC | Age: 49
End: 2019-04-20
Payer: MEDICAID

## 2019-04-20 VITALS
HEIGHT: 66 IN | WEIGHT: 210 LBS | RESPIRATION RATE: 18 BRPM | HEART RATE: 84 BPM | DIASTOLIC BLOOD PRESSURE: 79 MMHG | BODY MASS INDEX: 33.75 KG/M2 | OXYGEN SATURATION: 98 % | TEMPERATURE: 98 F | SYSTOLIC BLOOD PRESSURE: 150 MMHG

## 2019-04-20 DIAGNOSIS — J01.10 ACUTE NON-RECURRENT FRONTAL SINUSITIS: ICD-10-CM

## 2019-04-20 DIAGNOSIS — R05.9 COUGH: Primary | ICD-10-CM

## 2019-04-20 DIAGNOSIS — R11.2 NAUSEA AND VOMITING, INTRACTABILITY OF VOMITING NOT SPECIFIED, UNSPECIFIED VOMITING TYPE: ICD-10-CM

## 2019-04-20 DIAGNOSIS — H66.91 RIGHT OTITIS MEDIA, UNSPECIFIED OTITIS MEDIA TYPE: ICD-10-CM

## 2019-04-20 DIAGNOSIS — J30.89 NON-SEASONAL ALLERGIC RHINITIS, UNSPECIFIED TRIGGER: ICD-10-CM

## 2019-04-20 LAB
CTP QC/QA: YES
FLUAV AG NPH QL: NEGATIVE
FLUBV AG NPH QL: NEGATIVE

## 2019-04-20 PROCEDURE — 87804 INFLUENZA ASSAY W/OPTIC: CPT | Mod: QW,S$GLB,, | Performed by: PHYSICIAN ASSISTANT

## 2019-04-20 PROCEDURE — 99213 PR OFFICE/OUTPT VISIT, EST, LEVL III, 20-29 MIN: ICD-10-PCS | Mod: S$GLB,,, | Performed by: PHYSICIAN ASSISTANT

## 2019-04-20 PROCEDURE — S0119 PR ONDANSETRON, ORAL, 4MG: ICD-10-PCS | Mod: S$GLB,,, | Performed by: PHYSICIAN ASSISTANT

## 2019-04-20 PROCEDURE — 99213 OFFICE O/P EST LOW 20 MIN: CPT | Mod: S$GLB,,, | Performed by: PHYSICIAN ASSISTANT

## 2019-04-20 PROCEDURE — S0119 ONDANSETRON 4 MG: HCPCS | Mod: S$GLB,,, | Performed by: PHYSICIAN ASSISTANT

## 2019-04-20 PROCEDURE — 87804 POCT INFLUENZA A/B: ICD-10-PCS | Mod: QW,S$GLB,, | Performed by: PHYSICIAN ASSISTANT

## 2019-04-20 RX ORDER — DOXYCYCLINE 100 MG/1
100 CAPSULE ORAL 2 TIMES DAILY
Qty: 20 CAPSULE | Refills: 0 | Status: SHIPPED | OUTPATIENT
Start: 2019-04-20 | End: 2019-04-30

## 2019-04-20 RX ORDER — PROMETHAZINE HYDROCHLORIDE AND DEXTROMETHORPHAN HYDROBROMIDE 6.25; 15 MG/5ML; MG/5ML
5 SYRUP ORAL NIGHTLY PRN
Qty: 180 ML | Refills: 0 | Status: SHIPPED | OUTPATIENT
Start: 2019-04-20 | End: 2019-05-01 | Stop reason: RX

## 2019-04-20 RX ORDER — FLUTICASONE PROPIONATE 50 MCG
1 SPRAY, SUSPENSION (ML) NASAL DAILY
Qty: 16 G | Refills: 3 | Status: SHIPPED | OUTPATIENT
Start: 2019-04-20 | End: 2019-05-31

## 2019-04-20 RX ORDER — LORATADINE 10 MG/1
10 TABLET ORAL DAILY
Qty: 30 TABLET | Refills: 5 | Status: SHIPPED | OUTPATIENT
Start: 2019-04-20 | End: 2020-02-13

## 2019-04-20 RX ORDER — BETAMETHASONE SODIUM PHOSPHATE AND BETAMETHASONE ACETATE 3; 3 MG/ML; MG/ML
6 INJECTION, SUSPENSION INTRA-ARTICULAR; INTRALESIONAL; INTRAMUSCULAR; SOFT TISSUE
Status: COMPLETED | OUTPATIENT
Start: 2019-04-20 | End: 2019-04-20

## 2019-04-20 RX ORDER — ONDANSETRON 4 MG/1
4 TABLET, ORALLY DISINTEGRATING ORAL
Status: COMPLETED | OUTPATIENT
Start: 2019-04-20 | End: 2019-04-20

## 2019-04-20 RX ORDER — PREDNISONE 20 MG/1
20 TABLET ORAL DAILY
Qty: 10 TABLET | Refills: 0 | Status: SHIPPED | OUTPATIENT
Start: 2019-04-21 | End: 2019-05-01

## 2019-04-20 RX ADMIN — ONDANSETRON 4 MG: 4 TABLET, ORALLY DISINTEGRATING ORAL at 12:04

## 2019-04-20 RX ADMIN — BETAMETHASONE SODIUM PHOSPHATE AND BETAMETHASONE ACETATE 6 MG: 3; 3 INJECTION, SUSPENSION INTRA-ARTICULAR; INTRALESIONAL; INTRAMUSCULAR; SOFT TISSUE at 01:04

## 2019-04-20 NOTE — PROGRESS NOTES
"Subjective:       Patient ID: Audrey Natarajan is a 48 y.o. female.    Vitals:  height is 5' 6" (1.676 m) and weight is 95.3 kg (210 lb). Her temperature is 98.2 °F (36.8 °C). Her blood pressure is 150/79 (abnormal) and her pulse is 84. Her respiration is 18 and oxygen saturation is 98%.     Chief Complaint: URI    Cough   This is a new problem. The current episode started 1 to 4 weeks ago. The problem has been rapidly worsening. The cough is productive of sputum. Associated symptoms include chills and a fever. Pertinent negatives include no ear pain, eye redness, hemoptysis, myalgias, rash, sore throat, shortness of breath or wheezing. The symptoms are aggravated by lying down. She has tried OTC cough suppressant for the symptoms. The treatment provided no relief.       Constitution: Positive for chills, sweating and fever. Negative for fatigue.   HENT: Negative for ear pain, congestion, sinus pain, sinus pressure, sore throat and voice change.    Neck: Negative for painful lymph nodes.   Eyes: Negative for eye redness.   Respiratory: Positive for cough and sputum production. Negative for chest tightness, bloody sputum, COPD, shortness of breath, stridor, wheezing and asthma.    Gastrointestinal: Negative for nausea and vomiting.   Musculoskeletal: Negative for muscle ache.   Skin: Negative for rash.   Allergic/Immunologic: Negative for seasonal allergies and asthma.   Hematologic/Lymphatic: Negative for swollen lymph nodes.       Patient presents with acute N/V from nasal and sinus congestion from overproduction of mucus. She has also had fevers, chills, and a productive cough in the last 2 weeks. Patient has been giving herself neb tx at home every 6-8 hrs with some relief and expectoration. She taken OTC cough suppressants with minimal relief.   Objective:      Physical Exam   Constitutional: She is oriented to person, place, and time. She appears well-developed and well-nourished. She is cooperative.  " Non-toxic appearance. She does not appear ill. No distress.   HENT:   Head: Normocephalic and atraumatic.   Right Ear: Hearing, external ear and ear canal normal. Tympanic membrane is erythematous and bulging. A middle ear effusion is present.   Left Ear: Hearing, external ear and ear canal normal. Tympanic membrane is erythematous. A middle ear effusion is present.   Nose: Mucosal edema and rhinorrhea present. No nasal deformity. No epistaxis. Right sinus exhibits no maxillary sinus tenderness and no frontal sinus tenderness. Left sinus exhibits no maxillary sinus tenderness and no frontal sinus tenderness.   Mouth/Throat: Uvula is midline and mucous membranes are normal. No trismus in the jaw. Normal dentition. No uvula swelling. Posterior oropharyngeal erythema present.   Eyes: Conjunctivae and lids are normal. No scleral icterus.   Sclera clear bilat   Neck: Trachea normal, full passive range of motion without pain and phonation normal. Neck supple.   Cardiovascular: Normal rate, regular rhythm, normal heart sounds, intact distal pulses and normal pulses.   Pulmonary/Chest: Effort normal. No respiratory distress. She has wheezes in the right upper field.   Abdominal: Soft. Normal appearance and bowel sounds are normal. She exhibits no distension. There is no tenderness.   Musculoskeletal: Normal range of motion. She exhibits no edema or deformity.   Lymphadenopathy:        Head (right side): Submandibular adenopathy present.        Head (left side): Submandibular adenopathy present.   Neurological: She is alert and oriented to person, place, and time. She exhibits normal muscle tone. Coordination normal.   Skin: Skin is warm, dry and intact. She is not diaphoretic. No pallor.   Psychiatric: She has a normal mood and affect. Her speech is normal and behavior is normal. Judgment and thought content normal. Cognition and memory are normal.   Nursing note and vitals reviewed.      Assessment:       1. Cough    2.  Nausea and vomiting, intractability of vomiting not specified, unspecified vomiting type    3. Right otitis media, unspecified otitis media type    4. Acute non-recurrent frontal sinusitis    5. Non-seasonal allergic rhinitis, unspecified trigger        Plan:       Patient is encouraged to continue taking her neb tx, BID and 1-2 more throughout the day, prn.    Cough  -     POCT Influenza A/B  -     promethazine-dextromethorphan (PROMETHAZINE-DM) 6.25-15 mg/5 mL Syrp; Take 5 mLs by mouth nightly as needed.  Dispense: 180 mL; Refill: 0    Nausea and vomiting, intractability of vomiting not specified, unspecified vomiting type  -     ondansetron disintegrating tablet 4 mg    Right otitis media, unspecified otitis media type  -     doxycycline (VIBRAMYCIN) 100 MG Cap; Take 1 capsule (100 mg total) by mouth 2 (two) times daily. for 10 days  Dispense: 20 capsule; Refill: 0    Acute non-recurrent frontal sinusitis  -     doxycycline (VIBRAMYCIN) 100 MG Cap; Take 1 capsule (100 mg total) by mouth 2 (two) times daily. for 10 days  Dispense: 20 capsule; Refill: 0  -     betamethasone acetate-betamethasone sodium phosphate injection 6 mg  -     predniSONE (DELTASONE) 20 MG tablet; Take 1 tablet (20 mg total) by mouth once daily. for 10 days  Dispense: 10 tablet; Refill: 0  -     loratadine (CLARITIN) 10 mg tablet; Take 1 tablet (10 mg total) by mouth once daily.  Dispense: 30 tablet; Refill: 5  -     fluticasone (FLONASE ALLERGY RELIEF) 50 mcg/actuation nasal spray; 1 spray (50 mcg total) by Each Nare route once daily.  Dispense: 16 g; Refill: 3    Non-seasonal allergic rhinitis, unspecified trigger  -     loratadine (CLARITIN) 10 mg tablet; Take 1 tablet (10 mg total) by mouth once daily.  Dispense: 30 tablet; Refill: 5  -     fluticasone (FLONASE ALLERGY RELIEF) 50 mcg/actuation nasal spray; 1 spray (50 mcg total) by Each Nare route once daily.  Dispense: 16 g; Refill: 3    There are no Patient Instructions on file for this  visit.

## 2019-04-20 NOTE — PATIENT INSTRUCTIONS
Sinusitis (Antibiotic Treatment)    The sinuses are air-filled spaces within the bones of the face. They connect to the inside of the nose. Sinusitis is an inflammation of the tissue lining the sinus cavity. Sinus inflammation can occur during a cold. It can also be due to allergies to pollens and other particles in the air. Sinusitis can cause symptoms of sinus congestion and fullness. A sinus infection causes fever, headache and facial pain. There is often green or yellow drainage from the nose or into the back of the throat (post-nasal drip). You have been given antibiotics to treat this condition.  Home care:  · Take the full course of antibiotics as instructed. Do not stop taking them, even if you feel better.  · Drink plenty of water, hot tea, and other liquids. This may help thin mucus. It also may promote sinus drainage.  · Heat may help soothe painful areas of the face. Use a towel soaked in hot water. Or,  the shower and direct the hot spray onto your face. Using a vaporizer along with a menthol rub at night may also help.   · An expectorant containing guaifenesin may help thin the mucus and promote drainage from the sinuses.  · Over-the-counter decongestants may be used unless a similar medicine was prescribed. Nasal sprays work the fastest. Use one that contains phenylephrine or oxymetazoline. First blow the nose gently. Then use the spray. Do not use these medicines more often than directed on the label or symptoms may get worse. You may also use tablets containing pseudoephedrine. Avoid products that combine ingredients, because side effects may be increased. Read labels. You can also ask the pharmacist for help. (NOTE: Persons with high blood pressure should not use decongestants. They can raise blood pressure.)  · Over-the-counter antihistamines may help if allergies contributed to your sinusitis.    · Do not use nasal rinses or irrigation during an acute sinus infection, unless told to by  your health care provider. Rinsing may spread the infection to other sinuses.  · Use acetaminophen or ibuprofen to control pain, unless another pain medicine was prescribed. (If you have chronic liver or kidney disease or ever had a stomach ulcer, talk with your doctor before using these medicines. Aspirin should never be used in anyone under 18 years of age who is ill with a fever. It may cause severe liver damage.)  · Don't smoke. This can worsen symptoms.  Follow-up care  Follow up with your healthcare provider or our staff if you are not improving within the next week.  When to seek medical advice  Call your healthcare provider if any of these occur:  · Facial pain or headache becoming more severe  · Stiff neck  · Unusual drowsiness or confusion  · Swelling of the forehead or eyelids  · Vision problems, including blurred or double vision  · Fever of 100.4ºF (38ºC) or higher, or as directed by your healthcare provider  · Seizure  · Breathing problems  · Symptoms not resolving within 10 days  Date Last Reviewed: 4/13/2015  © 7486-9104 [employer] [address] [place] [address]. All rights reserved. This information is not intended as a substitute for professional medical care. Always follow your healthcare professional's instructions.        Middle Ear Infection (Adult)  You have an infection of the middle ear, the space behind the eardrum. This is also called acute otitis media (AOM). Sometimes it is caused by the common cold. This is because congestion can block the internal passage (eustachian tube) that drains fluid from the middle ear. When the middle ear fills with fluid, bacteria can grow there and cause an infection. Oral antibiotics are used to treat this illness, not ear drops. Symptoms usually start to improve within 1 to 2 days of treatment.    Home care  The following are general care guidelines:  · Finish all of the antibiotic medicine given, even though you may feel better  after the first few days.  · You may use over-the-counter medicine, such as acetaminophen or ibuprofen, to control pain and fever, unless something else was prescribed. If you have chronic liver or kidney disease or have ever had a stomach ulcer or gastrointestinal bleeding, talk with your healthcare provider before using these medicines. Do not give aspirin to anyone under 18 years of age who has a fever. It may cause severe illness or death.  Follow-up care  Follow up with your healthcare provider, or as advised, in 2 weeks if all symptoms have not gotten better, or if hearing doesn't go back to normal within 1 month.  When to seek medical advice  Call your healthcare provider right away if any of these occur:  · Ear pain gets worse or does not improve after 3 days of treatment  · Unusual drowsiness or confusion  · Neck pain, stiff neck, or headache  · Fluid or blood draining from the ear canal  · Fever of 100.4°F (38°C) or as advised   · Seizure  Date Last Reviewed: 6/1/2016  © 2193-9623 [employer]. [address] [place] [address]. All rights reserved. This information is not intended as a substitute for professional medical care. Always follow your healthcare professional's instructions.        Self-Care for Sinusitis     Drinking plenty of water can help sinuses drain.   Sinusitis can often be managed with self-care. Self-care can keep sinuses moist and make you feel more comfortable. Remember to follow your doctor's instructions closely. This can make a big difference in getting your sinus problem under control.  Drink fluids  Drinking extra fluids helps thin your mucus. This lets it drain from your sinuses more easily. Have a glass of water every hour or two. A humidifier helps in much the same way. Fluids can also offset the drying effects of certain medicines. If you use a humidifier, follow the product maker's instructions on how to use it. Clean it on a regular schedule.  Use  saltwater rinses  Rinses help keep your sinuses and nose moist. Mix a teaspoon of salt in 8 ounces of fresh, warm water. Use a bulb syringe to gently squirt the water into your nose a few times a day. You can also buy ready-made saline nasal sprays.  Apply hot or cold packs  Applying heat to the area surrounding your sinuses may make you feel more comfortable. Use a hot water bottle or a hand towel dipped in hot water. Some people also find ice packs effective for relieving pain.  Medicines  Your doctor may prescribe medications to help treat your sinusitis. If you have an infection, antibiotics can help clear it up. If you are prescribed antibiotics, take all pills on schedule until they are gone, even if you feel better. Decongestants help relieve swelling. Use decongestant sprays for short periods only under the direction of your doctor. If you have allergies, your doctor may prescribe medications to help relieve them.   Date Last Reviewed: 10/1/2016  © 2477-5665 [employer] [address] [place] [address], [place] [address]. All rights reserved. This information is not intended as a substitute for professional medical care. Always follow your healthcare professional's instructions.

## 2019-04-22 ENCOUNTER — DOCUMENTATION ONLY (OUTPATIENT)
Dept: REHABILITATION | Facility: HOSPITAL | Age: 49
End: 2019-04-22

## 2019-04-22 NOTE — PROGRESS NOTES
Physical Therapy    Patient did not show up for today's therapy session. Next scheduled appointment 4/26/19 @11am     Cancel: 0    No Show: 2      Herson Giron, PT, DPT

## 2019-04-25 ENCOUNTER — TELEPHONE (OUTPATIENT)
Dept: URGENT CARE | Facility: CLINIC | Age: 49
End: 2019-04-25

## 2019-04-25 NOTE — TELEPHONE ENCOUNTER
4/25/19 Pt called in asking if we could prescribed another cough medication for her? Asked FLASH Cesar (PA) if there is anything else pt could take. FLASH Dukes recommended Mucinex-dm. Pt states she has been taking Mucinex-dm, Nquil and other otc medication with no relief for the cough. Pt mentioned she has chest congestion with the cough. I told pt if medication is not working she should follow up just to make sure everything else is ok.

## 2019-04-26 ENCOUNTER — CLINICAL SUPPORT (OUTPATIENT)
Dept: REHABILITATION | Facility: HOSPITAL | Age: 49
End: 2019-04-26
Attending: INTERNAL MEDICINE
Payer: MEDICAID

## 2019-04-26 DIAGNOSIS — R29.898 WEAKNESS OF LEFT LOWER EXTREMITY: ICD-10-CM

## 2019-04-26 DIAGNOSIS — M53.86 DECREASED ROM OF LUMBAR SPINE: ICD-10-CM

## 2019-04-26 PROCEDURE — 97110 THERAPEUTIC EXERCISES: CPT | Mod: PN

## 2019-04-26 NOTE — PROGRESS NOTES
"  [facility]      Name: Audrey Natarajan  Clinic Number: [id number]    Date of Treatment: 2019     Diagnosis:   Encounter Diagnoses   Name Primary?    Weakness of left lower extremity     Decreased ROM of lumbar spine      Physician: Donaldo Pena MD    Precautions: Fall     Pattern of pain determined: Pattern 1     Evaluation: 19  Authorization period: 2019 - 2019  Plan of care Expiration: 2019  Reassessment Due: 19  Visit # / Visits authorized:     Time In: 1100 am  Time Out:  11:45 am  Total Billable Time: 30 minutes     Subjective   Audrey reports that her lower back is feeling much better. Pt states she rested and applied Biofreeze over the weekend. Pt states she lower back pain drop down from 6/10 to 4/10. Pt still feel that her lower back is swollen. Pt wants to participate in walking challenge in May.       Patient reports tolerating previous visit well.  No increase in symptoms.  Patient reports their pain to be 4/10 on a 0-10 scale with 0 being no pain and 10 being the worst pain imaginable.  Pain Location: low back and L hip     Occupation:  None    Leisure: tv, clean house                      Pts goals:  "I was still doing all the stretches since the last time I came here, so I just want to have decreased pain"     Objective     Baseline IM Testing Results:   Date of testin19  ROM 42 deg   Max Peak Torque 161    Min Peak Torque 49    Flex/Ext Ratio 3.29   % below normative data 39     ROM Reassessed 19: 0-45 degrees    Outcomes:  CMS Impairment/Limitation/Restriction for FOTO Lumbar Spine Survey  Status Limitation G-Code CMS Severity Modifier  Intake 37% 63%  Predicted 43% 57% Goal Status+ CK - At least 40 percent but less than 60 percent  3/6/2019 40% 60%  2019 48% 52% Current Status CK - At least 40 percent but less than 60 percent  D/C Status CK **only report if this is discharge survey  +Based on FOTO " "predicted change score       Treatment    Pt was instructed in and performed the following:     BOLDED EXERCISES WERE PERFORMED THIS VISIT    Audrey received therapeutic exercises to develop/improved posture, cardiovascular endurance, muscular endurance, lumbar/cervical ROM, strength and muscular endurance for 45 minutes including the following exercises:     DKTC x2'  LTR  x2'  +side lying opening mob over BOSU 2x30" B  HSS with strap 30"x 3  TrA with SLR x10 each  clams with YTBx20 each  Slump stretch 2x20 (tensioner)     [facility] 4/29/2019   Visit Number 9   VAS Pain Rating 4   Treadmill Time (in min.) 10   Speed 1.6   Incline 0   Flexion in Lying -   Lumbar Extension Seat Pad -   Femur Restraint -   Top Dead Center -   Counterweight -   Lumbar Flexion -   Lumbar Extension -   Lumbar Peak Torque -   Min Torque -   Test Percent Below Normative Data -   Lumbar Weight 66   Repetitions 20   Rating of Perceived Exertion 3   Ice - Z Lie (in min.) 0         Peripheral muscle strengthening which included 1 set of 15-20 repetitions at a slow, controlled 7 second per rep pace focused on strengthening supporting musculature for improved body mechanics and functional mobility.  Pt and therapist focused on proper form during treatment to ensure optimal strengthening of each targeted muscle group.  Machines were utilized including torso rotation, leg extension, leg curl, chest press, upright row. Tricep extension, bicep curl, leg press, and hip abduction added visit 3    Audrey received the following manual therapy techniques: 0 minutes       Home Exercise Program as follows:   Pt demo good understanding of the education provided. Audrey demonstrated good return demonstration of activities.   Lumbar roll use compliance: NA  Additional exercises taught this treatment session: none    Assessment     Pt with Good tolerance to all exercises. Pt was re-assessed today with improvement in lumbar range of motion  In flexion " by 3 deg. [facility] lumbar spine survey demonstrated 52% impairment, but it has demonstrated improvement since initial yared. Pt tolerated stretching techniques good with some pain during lower trunk rotations. V/c's required to perform exercises with pain free. Pt tolerated peripheral muscle strengthening well with no provocation of pain, but she demonstrated difficult. Pt tolerated Medx lumbar extension  with 66 ft.lbs, 20 reps, and RPE of 3. Biofreeze was applied in right lower back to help decrease pain.  Plan to cont progress machine exercises.     Patient is making fair progress towards established goals.  Pt will continue to benefit from skilled outpatient physical therapy to address the deficits stated in the impairment chart, provide pt/family education and to maximize pt's level of independence in the home and community environment.       Pt's spiritual, cultural and educational needs considered and pt agreeable to plan of care and goals as stated below:     Medical necessity is demonstrated by the following problem list.    Pt presents with the following impairments:        GOALS: Pt is in agreement with the following goals.     Short term goals:  6 weeks or 10 visits   1.  Pt will demonstrate increased lumbar ROM by at least 5 degrees from the initial ROM value with improvements noted in functional ROM and ability to perform ADLs.  Appropriate, ongoing  2.  Pt will demonstrate increased maximum isometric torque value by % when compared to the initial value resulting in improved ability to perform bending, lifting, and carrying activities safely, confidently. - will perform next visit and make goal. Appropriate, ongoing  3.  Patient report a reduction in worst pain score by 3-4 points for improved tolerance during work and recreational activities. Appropriate, ongoing  4. Pt to improve BLE by 1/2 MMT to improve functional mobility and participation in ADLs. Appropriate, ongoing  5.  Pt able to perform HEP  correctly with minimal cueing or supervision for therapist. Appropriate, ongoing        Long term goals: 13 weeks or 20 visits   1. Pt will demonstrate increased lumbar flexion by at least 10 degrees from initial ROM value, resulting in improved ability to perform functional fwd bending while standing and sitting. Appropriate, ongoing  2. Pt will demonstrate increased maximum isometric torque value by % when compared to the initial value resulting in improved ability to perform bending, lifting, and carrying activities safely, confidently. Will assess next visit and make goal. Appropriate, ongoing  3. Pt to demonstrate ability to independently control and reduce their pain through posture positioning and mechanical movements throughout a typical day.Appropriate, ongoing  4. Pt to improve BLE by 1 MMT to improve functional mobility and participation in ADLs. Appropriate, ongoing  4.  Patient will demonstrate improved overall function per FOTO Survey to CK = at least 40% but < 60% impaired, limited or restricted score or less.Appropriate, ongoing      Plan   Continue with established Plan of Care towards established PT goals.   Progress with MedX resistance at next visit.     Herson Giron, PT, DPT  4/26/2019

## 2019-04-26 NOTE — PROGRESS NOTES
"  [facility]      Name: Audrey Natarajan  Clinic Number: [id number]    Date of Treatment: 2019     Diagnosis:   Encounter Diagnoses   Name Primary?    Weakness of left lower extremity     Decreased ROM of lumbar spine      Physician: Donaldo Pena MD    Precautions: Fall     Pattern of pain determined: Pattern 1     Evaluation: 19  Authorization period: 2019 - 2019  Plan of care Expiration: 2019  Reassessment Due: 19  Visit # / Visits authorized:     Time In: 1103  Time Out:  1157  Total Billable Time: 54 minutes     Subjective   Audrey reports pain in low back bilaterally around her kidneys. I kind of pulled my back when washing my car the other day. My back has been hurting a lot more too from not being able to stop coughing.     Patient reports tolerating previous visit well.  No increase in symptoms.  Patient reports their pain to be 7/10 on a 0-10 scale with 0 being no pain and 10 being the worst pain imaginable.  Pain Location: low back and L hip     Occupation:  None    Leisure: tv, clean house                      Pts goals:  "I was still doing all the stretches since the last time I came here, so I just want to have decreased pain"     Objective     Baseline IM Testing Results:   Date of testin19  ROM 42 deg   Max Peak Torque 161    Min Peak Torque 49    Flex/Ext Ratio 3.29   % below normative data 39     ROM Reassessed 3/6/19: 0-42 degrees    Outcomes:  Initial score:  least 60% but < 80% impaired, limited or restricted  Visit 5 score:  Goal: at least 40% but < 60% impaired, limited or restricted      Treatment    Pt was instructed in and performed the following:     BOLDED EXERCISES WERE PERFORMED THIS VISIT    Audrey received therapeutic exercises to develop/improved posture, cardiovascular endurance, muscular endurance, lumbar/cervical ROM, strength and muscular endurance for 60 minutes including the following " "exercises:     DKTC x2'  LTR  x2'  +side lying opening mob over BOSU 2x30" B  HSS with strap 30"x 3  TrA with SLR x10 each  clams with YTBx20 each  Slump stretch 2x20 (tensioner)     [facility] 4/26/2019   Visit Number 8   VAS Pain Rating 7   Treadmill Time (in min.) 10   Speed 1.6   Incline 0   Flexion in Lying -   Lumbar Extension Seat Pad -   Femur Restraint -   Top Dead Center -   Counterweight -   Lumbar Flexion 42   Lumbar Extension 0   Lumbar Peak Torque -   Min Torque -   Test Percent Below Normative Data -   Lumbar Weight 66   Repetitions 20   Rating of Perceived Exertion 3   Ice - Z Lie (in min.) -         Peripheral muscle strengthening which included 1 set of 15-20 repetitions at a slow, controlled 7 second per rep pace focused on strengthening supporting musculature for improved body mechanics and functional mobility.  Pt and therapist focused on proper form during treatment to ensure optimal strengthening of each targeted muscle group.  Machines were utilized including torso rotation, leg extension, leg curl, chest press, upright row. Tricep extension, bicep curl, leg press, and hip abduction added visit 3    Audrey received the following manual therapy techniques: 0 minutes       Home Exercise Program as follows:   Pt demo good understanding of the education provided. Audrey demonstrated good return demonstration of activities.   Lumbar roll use compliance: NA  Additional exercises taught this treatment session: none    Assessment     Pt with fair tolerance to all exercises. We added side lying opening mob to improve soft tissue mobility to lateral lumbar musculature. Pt with good relief of discomfort. She was able to perform 66# of resistance on MedX machine for 20 reps with RPE of 3/10. Pt with inconsistent results of improvement with low back pain secondary to decreased compliance with HEP and with attending physical therapy. Spoke with pt about importance of showing up to all therapy " sessions. Also spoke with pt about using proper lifting mechanics instead of bending forward and causing unnecessary flexion stress on lumbar spine.     Patient is making fair progress towards established goals.  Pt will continue to benefit from skilled outpatient physical therapy to address the deficits stated in the impairment chart, provide pt/family education and to maximize pt's level of independence in the home and community environment.       Pt's spiritual, cultural and educational needs considered and pt agreeable to plan of care and goals as stated below:     Medical necessity is demonstrated by the following problem list.    Pt presents with the following impairments:           History  Co-morbidities and personal factors that may impact the plan of care Examination  Body Structures and Functions, activity limitations and participation restrictions that may impact the plan of care Clinical Presentation    Decision Making/ Complexity Score    Co-morbidities:   COPD/asthma, diabetes, difficulty sleeping, high BMI and HTN           Personal Factors:   lifestyle Body Regions:   back  lower extremities  upper extremities  trunk     Body Systems:   ROM  strength  balance  gait  transitions  motor control     Activity limitations:   Learning and applying knowledge  no deficits     General Tasks and Commands  no deficits     Communication  no deficits     Mobility  lifting and carrying objects  walking  driving (bike, car, motorcycle)     Self care  washing oneself (bathing, drying, washing hands)  caring for body parts (brushing teeth, shaving, grooming)  toileting  dressing     Domestic Life  shopping  cooking  doing house work (cleaning house, washing dishes, laundry)  assisting others     Interactions/Relationships  no deficits     Life Areas  no deficits     Community and Social Life  no deficits     Participation Restrictions:   Min to mod        stable and uncomplicated    Low based on FOTO Score              GOALS: Pt is in agreement with the following goals.     Short term goals:  6 weeks or 10 visits   1.  Pt will demonstrate increased lumbar ROM by at least 5 degrees from the initial ROM value with improvements noted in functional ROM and ability to perform ADLs  2.  Pt will demonstrate increased maximum isometric torque value by % when compared to the initial value resulting in improved ability to perform bending, lifting, and carrying activities safely, confidently. - will perform next visit and make goal  3.  Patient report a reduction in worst pain score by 3-4 points for improved tolerance during work and recreational activities  4. Pt to improve BLE by 1/2 MMT to improve functional mobility and participation in ADLs.   5.  Pt able to perform HEP correctly with minimal cueing or supervision for therapist        Long term goals: 13 weeks or 20 visits   1. Pt will demonstrate increased lumbar flexion by at least 10 degrees from initial ROM value, resulting in improved ability to perform functional fwd bending while standing and sitting.   2. Pt will demonstrate increased maximum isometric torque value by % when compared to the initial value resulting in improved ability to perform bending, lifting, and carrying activities safely, confidently. Will assess next visit and make goal  3. Pt to demonstrate ability to independently control and reduce their pain through posture positioning and mechanical movements throughout a typical day.  4. Pt to improve BLE by 1 MMT to improve functional mobility and participation in ADLs.   4.  Patient will demonstrate improved overall function per FOTO Survey to CK = at least 40% but < 60% impaired, limited or restricted score or less.      Plan   Continue with established Plan of Care towards established PT goals.   Progress with MedX resistance at next visit.     Herson Giron, PT, DPT  4/26/2019

## 2019-04-29 ENCOUNTER — CLINICAL SUPPORT (OUTPATIENT)
Dept: REHABILITATION | Facility: HOSPITAL | Age: 49
End: 2019-04-29
Attending: INTERNAL MEDICINE
Payer: MEDICAID

## 2019-04-29 DIAGNOSIS — M53.86 DECREASED ROM OF LUMBAR SPINE: ICD-10-CM

## 2019-04-29 DIAGNOSIS — R29.898 WEAKNESS OF LEFT LOWER EXTREMITY: ICD-10-CM

## 2019-04-29 PROCEDURE — 97110 THERAPEUTIC EXERCISES: CPT | Mod: PN

## 2019-04-29 NOTE — TELEPHONE ENCOUNTER
Left message to voicemail [phone] to return call to clinic re: MEDICATION     Fax received from [facility] re: medication. Letter sent to scanning to be scanned into chart     Pt d/c with [facility]

## 2019-05-01 ENCOUNTER — OFFICE VISIT (OUTPATIENT)
Dept: FAMILY MEDICINE | Facility: CLINIC | Age: 49
End: 2019-05-01
Payer: MEDICAID

## 2019-05-01 VITALS
BODY MASS INDEX: 33.98 KG/M2 | HEIGHT: 66 IN | TEMPERATURE: 97 F | DIASTOLIC BLOOD PRESSURE: 64 MMHG | HEART RATE: 77 BPM | RESPIRATION RATE: 17 BRPM | WEIGHT: 211.44 LBS | OXYGEN SATURATION: 99 % | SYSTOLIC BLOOD PRESSURE: 97 MMHG

## 2019-05-01 DIAGNOSIS — J44.9 CHRONIC OBSTRUCTIVE PULMONARY DISEASE, UNSPECIFIED COPD TYPE: Primary | Chronic | ICD-10-CM

## 2019-05-01 DIAGNOSIS — Z72.0 TOBACCO ABUSE: Chronic | ICD-10-CM

## 2019-05-01 DIAGNOSIS — E11.42 TYPE 2 DIABETES MELLITUS WITH DIABETIC POLYNEUROPATHY, WITHOUT LONG-TERM CURRENT USE OF INSULIN: ICD-10-CM

## 2019-05-01 DIAGNOSIS — I10 ESSENTIAL HYPERTENSION: Chronic | ICD-10-CM

## 2019-05-01 PROCEDURE — 99214 OFFICE O/P EST MOD 30 MIN: CPT | Mod: S$PBB,,, | Performed by: INTERNAL MEDICINE

## 2019-05-01 PROCEDURE — 99214 PR OFFICE/OUTPT VISIT, EST, LEVL IV, 30-39 MIN: ICD-10-PCS | Mod: S$PBB,,, | Performed by: INTERNAL MEDICINE

## 2019-05-01 PROCEDURE — 99999 PR PBB SHADOW E&M-EST. PATIENT-LVL IV: ICD-10-PCS | Mod: PBBFAC,,, | Performed by: INTERNAL MEDICINE

## 2019-05-01 PROCEDURE — 99999 PR PBB SHADOW E&M-EST. PATIENT-LVL IV: CPT | Mod: PBBFAC,,, | Performed by: INTERNAL MEDICINE

## 2019-05-01 PROCEDURE — 99214 OFFICE O/P EST MOD 30 MIN: CPT | Mod: PBBFAC,PN | Performed by: INTERNAL MEDICINE

## 2019-05-01 RX ORDER — PRAVASTATIN SODIUM 40 MG/1
40 TABLET ORAL DAILY
Qty: 30 TABLET | Refills: 5 | Status: SHIPPED | OUTPATIENT
Start: 2019-05-01 | End: 2019-11-03 | Stop reason: SDUPTHER

## 2019-05-01 RX ORDER — LISINOPRIL 5 MG/1
5 TABLET ORAL DAILY
Qty: 30 TABLET | Refills: 5 | Status: SHIPPED | OUTPATIENT
Start: 2019-05-01 | End: 2019-11-03 | Stop reason: SDUPTHER

## 2019-05-01 RX ORDER — METOPROLOL TARTRATE 25 MG/1
25 TABLET, FILM COATED ORAL DAILY
Qty: 30 TABLET | Refills: 5 | Status: SHIPPED | OUTPATIENT
Start: 2019-05-01 | End: 2019-11-03 | Stop reason: SDUPTHER

## 2019-05-01 RX ORDER — ALBUTEROL SULFATE 0.63 MG/3ML
0.63 SOLUTION RESPIRATORY (INHALATION) EVERY 8 HOURS PRN
Qty: 1 BOX | Refills: 1 | Status: SHIPPED | OUTPATIENT
Start: 2019-05-01 | End: 2019-11-27 | Stop reason: SDUPTHER

## 2019-05-01 RX ORDER — METFORMIN HYDROCHLORIDE 1000 MG/1
TABLET ORAL
Qty: 60 TABLET | Refills: 5 | Status: SHIPPED | OUTPATIENT
Start: 2019-05-01 | End: 2019-05-16 | Stop reason: SDUPTHER

## 2019-05-01 NOTE — PROGRESS NOTES
"Subjective:       Patient ID: Audrey Natarajan is a 48 y.o. female.    Chief Complaint: Medication Refill; Establish Care; and Follow-up    F/u chronic conditions    HPI 49 y/o w/ HTN DM COPD toboacco dependence presents for follow up. Seen two weeks ago in urgent care for cough and left ear pain prescribed prednisone and antibiotic for acute OM. Feels much improved to complete antibiotics today no loose stool or diarrhea. attendign PT for back feels this has helped with flexibility. Weight trending down    Review of Systems   Constitutional: Negative for activity change, appetite change, fatigue, fever and unexpected weight change.   HENT: Negative for ear pain, rhinorrhea and sore throat.    Eyes: Negative for discharge and visual disturbance.   Respiratory: Negative for chest tightness, shortness of breath and wheezing.    Cardiovascular: Negative for chest pain, palpitations and leg swelling.   Gastrointestinal: Negative for abdominal pain, constipation and diarrhea.   Endocrine: Negative for cold intolerance and heat intolerance.   Genitourinary: Negative for dysuria and hematuria.   Musculoskeletal: Positive for back pain. Negative for joint swelling and neck stiffness.   Skin: Negative for rash.   Neurological: Negative for dizziness, syncope, weakness and headaches.   Psychiatric/Behavioral: Negative for suicidal ideas.       Objective:     Vitals:    05/01/19 0858   BP: 97/64   BP Location: Right arm   Patient Position: Sitting   Pulse: 77   Resp: 17   Temp: 97 °F (36.1 °C)   TempSrc: Oral   SpO2: 99%   Weight: 95.9 kg (211 lb 6.7 oz)   Height: 5' 6" (1.676 m)          Physical Exam   Constitutional: She is oriented to person, place, and time. She appears well-developed and well-nourished.   HENT:   Head: Normocephalic and atraumatic.   Right TM intact w/o effusion  Minimal nasal mucosa edema   Eyes: Conjunctivae are normal. No scleral icterus.   Neck: Normal range of motion.   Cardiovascular: Normal " rate and regular rhythm. Exam reveals no gallop and no friction rub.   No murmur heard.  No LE edema   Pulmonary/Chest: Effort normal and breath sounds normal. She has no wheezes. She has no rales.   Abdominal: Soft. Bowel sounds are normal. There is no tenderness. There is no rebound and no guarding.   Musculoskeletal: Normal range of motion. She exhibits no edema or tenderness.   Neurological: She is alert and oriented to person, place, and time. No cranial nerve deficit.   Skin: Skin is warm and dry.   Psychiatric: She has a normal mood and affect.       Assessment and Plan   1. Chronic obstructive pulmonary disease, unspecified COPD type  Continue dulera prn albuterol   - albuterol (ACCUNEB) 0.63 mg/3 mL Nebu; Take 3 mLs (0.63 mg total) by nebulization every 8 (eight) hours as needed (wheezing). Rescue  Dispense: 1 Box; Refill: 1    2. Type 2 diabetes mellitus with diabetic polyneuropathy, without long-term current use of insulin  Repeat labs next week suspect some elevation in blood glucose related to recent steroid use continue metformin  - pravastatin (PRAVACHOL) 40 MG tablet; Take 1 tablet (40 mg total) by mouth once daily.  Dispense: 30 tablet; Refill: 5  - metFORMIN (GLUCOPHAGE) 1000 MG tablet; TAKE 1 TABLET(1000 MG) BY MOUTH TWICE DAILY WITH MEALS  Dispense: 60 tablet; Refill: 5  - CBC auto differential; Future  - Comprehensive metabolic panel; Future  - Hemoglobin A1c; Future    3. Essential hypertension  At goal continue medications  - metoprolol tartrate (LOPRESSOR) 25 MG tablet; Take 1 tablet (25 mg total) by mouth once daily.  Dispense: 30 tablet; Refill: 5  - lisinopril (PRINIVIL,ZESTRIL) 5 MG tablet; Take 1 tablet (5 mg total) by mouth once daily.  Dispense: 30 tablet; Refill: 5  - Comprehensive metabolic panel; Future    4. Tobacco dependence    Referral resent to smoking cessation to resume nicotine replacement

## 2019-05-04 DIAGNOSIS — G89.29 CHRONIC MIDLINE LOW BACK PAIN WITHOUT SCIATICA: ICD-10-CM

## 2019-05-04 DIAGNOSIS — M54.50 CHRONIC BILATERAL LOW BACK PAIN WITHOUT SCIATICA: ICD-10-CM

## 2019-05-04 DIAGNOSIS — M54.50 CHRONIC MIDLINE LOW BACK PAIN WITHOUT SCIATICA: ICD-10-CM

## 2019-05-04 DIAGNOSIS — G89.29 CHRONIC BILATERAL LOW BACK PAIN WITHOUT SCIATICA: ICD-10-CM

## 2019-05-05 DIAGNOSIS — M54.50 CHRONIC MIDLINE LOW BACK PAIN WITHOUT SCIATICA: ICD-10-CM

## 2019-05-05 DIAGNOSIS — G89.29 CHRONIC MIDLINE LOW BACK PAIN WITHOUT SCIATICA: ICD-10-CM

## 2019-05-06 ENCOUNTER — OFFICE VISIT (OUTPATIENT)
Dept: PODIATRY | Facility: CLINIC | Age: 49
End: 2019-05-06
Payer: MEDICAID

## 2019-05-06 VITALS
BODY MASS INDEX: 33.91 KG/M2 | SYSTOLIC BLOOD PRESSURE: 142 MMHG | HEIGHT: 66 IN | WEIGHT: 211 LBS | DIASTOLIC BLOOD PRESSURE: 94 MMHG

## 2019-05-06 DIAGNOSIS — S86.892A LEFT MEDIAL TIBIAL STRESS SYNDROME, INITIAL ENCOUNTER: ICD-10-CM

## 2019-05-06 DIAGNOSIS — M79.672 LEFT FOOT PAIN: Primary | ICD-10-CM

## 2019-05-06 DIAGNOSIS — M20.42 HAMMER TOES OF BOTH FEET: ICD-10-CM

## 2019-05-06 DIAGNOSIS — M20.12 HALLUX ABDUCTO VALGUS, LEFT: ICD-10-CM

## 2019-05-06 DIAGNOSIS — M20.5X1 HALLUX LIMITUS, ACQUIRED, RIGHT: ICD-10-CM

## 2019-05-06 DIAGNOSIS — M20.41 HAMMER TOES OF BOTH FEET: ICD-10-CM

## 2019-05-06 DIAGNOSIS — E11.49 TYPE II DIABETES MELLITUS WITH NEUROLOGICAL MANIFESTATIONS: ICD-10-CM

## 2019-05-06 DIAGNOSIS — M20.5X2 HALLUX LIMITUS, ACQUIRED, LEFT: ICD-10-CM

## 2019-05-06 PROCEDURE — 99999 PR PBB SHADOW E&M-EST. PATIENT-LVL III: CPT | Mod: PBBFAC,,, | Performed by: PODIATRIST

## 2019-05-06 PROCEDURE — 99213 PR OFFICE/OUTPT VISIT, EST, LEVL III, 20-29 MIN: ICD-10-PCS | Mod: S$PBB,,, | Performed by: PODIATRIST

## 2019-05-06 PROCEDURE — 99999 PR PBB SHADOW E&M-EST. PATIENT-LVL III: ICD-10-PCS | Mod: PBBFAC,,, | Performed by: PODIATRIST

## 2019-05-06 PROCEDURE — 99213 OFFICE O/P EST LOW 20 MIN: CPT | Mod: S$PBB,,, | Performed by: PODIATRIST

## 2019-05-06 PROCEDURE — 99213 OFFICE O/P EST LOW 20 MIN: CPT | Mod: PBBFAC,PO | Performed by: PODIATRIST

## 2019-05-06 RX ORDER — METHOCARBAMOL 500 MG/1
TABLET, FILM COATED ORAL
Qty: 100 TABLET | Refills: 0 | OUTPATIENT
Start: 2019-05-06

## 2019-05-06 RX ORDER — METHOCARBAMOL 500 MG/1
TABLET, FILM COATED ORAL
Qty: 100 TABLET | Refills: 1 | Status: SHIPPED | OUTPATIENT
Start: 2019-05-06 | End: 2019-07-09 | Stop reason: SDUPTHER

## 2019-05-06 RX ORDER — MELOXICAM 15 MG/1
TABLET ORAL
Qty: 30 TABLET | Refills: 2 | Status: SHIPPED | OUTPATIENT
Start: 2019-05-06 | End: 2019-05-21 | Stop reason: ALTCHOICE

## 2019-05-06 NOTE — PROGRESS NOTES
rSubjective:      Patient ID: Audrey Natarajan is a 48 y.o. female.    Chief Complaint: Nail Problem (left foot and ankle Pcp Dr. Pena 5/1/2019) and Foot Pain      Audrey Natarajan is a 48 y.o. female who presents to the podiatry clinic  with complaint of left shin and central metatarsal pain.  Description: moderate Nature: aching, throbbing and bruising Onset of the symptoms was several months ago. Precipitating event: increased activity while on vacation.  History of injury: no Current symptoms include: worsening symptoms after a period of activity. Aggravating factors: standing and walking. Alleviating factors: rest. Symptoms have progressed to a point and plateaued. Patient has had prior foot problems. Evaluation to date: none. Treatment to date: none. Patients rates pain 6/10 on pain scale.        PCP: Donaldo Pena MD    Date Last Seen by PCP:   Chief Complaint   Patient presents with    Nail Problem     left foot and ankle Pcp Dr. Pena 5/1/2019    Foot Pain       Hemoglobin A1C   Date Value Ref Range Status   01/30/2019 6.6 (H) 4.0 - 5.6 % Final     Comment:     ADA Screening Guidelines:  5.7-6.4%  Consistent with prediabetes  >or=6.5%  Consistent with diabetes  High levels of fetal hemoglobin interfere with the HbA1C  assay. Heterozygous hemoglobin variants (HbS, HgC, etc)do  not significantly interfere with this assay.   However, presence of multiple variants may affect accuracy.     06/13/2018 7.2 (H) 4.0 - 5.6 % Final     Comment:     ADA Screening Guidelines:  5.7-6.4%  Consistent with prediabetes  >or=6.5%  Consistent with diabetes  High levels of fetal hemoglobin interfere with the HbA1C  assay. Heterozygous hemoglobin variants (HbS, HgC, etc)do  not significantly interfere with this assay.   However, presence of multiple variants may affect accuracy.     02/26/2018 6.1 (H) 4.0 - 5.6 % Final     Comment:     According to ADA guidelines, hemoglobin A1c <7.0% represents  optimal control  in non-pregnant diabetic patients. Different  metrics may apply to specific patient populations.   Standards of Medical Care in Diabetes-2016.  For the purpose of screening for the presence of diabetes:  <5.7%     Consistent with the absence of diabetes  5.7-6.4%  Consistent with increasing risk for diabetes   (prediabetes)  >or=6.5%  Consistent with diabetes  Currently, no consensus exists for use of hemoglobin A1c  for diagnosis of diabetes for children.  This Hemoglobin A1c assay has significant interference with fetal   hemoglobin   (HbF). The results are invalid for patients with abnormal amounts of   HbF,   including those with known Hereditary Persistence   of Fetal Hemoglobin. Heterozygous hemoglobin variants (HbAS, HbAC,   HbAD, HbAE, HbA2) do not significantly interfere with this assay;   however, presence of multiple variants in a sample may impact the %   interference.             Patient Active Problem List   Diagnosis    Essential hypertension    COPD (chronic obstructive pulmonary disease)    Hypertriglyceridemia    Tobacco abuse    Mild protein malnutrition    Diabetic neuropathy    Leg swelling    Incisional hernia without mention of obstruction or gangrene    DM type 2 without retinopathy    History of DVT (deep vein thrombosis)    History of pulmonary embolus (PE)    Bipolar 1 disorder    Gastroparesis due to DM    Thrombocytosis    Leukocytosis    Morbid obesity    Type 2 diabetes mellitus    Acne    Chronic left-sided low back pain with sciatica    Chronic left-sided low back pain without sciatica    Weakness of left lower extremity    Decreased range of motion of lumbar spine    Other chronic pain    Vomiting and diarrhea    Chronic bilateral low back pain with left-sided sciatica    Chronic midline low back pain without sciatica    Weakness of trunk musculature    Decreased range of motion of intervertebral discs of lumbar spine    Nuclear sclerosis of both eyes     Bilateral ocular hypertension    Refractive error    Lower extremity weakness    Decreased ROM of lumbar spine       Current Outpatient Medications on File Prior to Visit   Medication Sig Dispense Refill    acetaminophen (ARTHRITIS PAIN RELIEVER) 650 MG TbSR Take 650 mg by mouth. Pt states taking 2 -3 times a day      albuterol (ACCUNEB) 0.63 mg/3 mL Nebu Take 3 mLs (0.63 mg total) by nebulization every 8 (eight) hours as needed (wheezing). Rescue 1 Box 1    aspirin (ECOTRIN) 81 MG EC tablet Take 81 mg by mouth once daily.       azelastine (ASTELIN) 137 mcg (0.1 %) nasal spray 1 spray (137 mcg total) by Nasal route 2 (two) times daily. 30 mL 0    b complex vitamins tablet Take 1 tablet by mouth once daily. 90 tablet 2    blood sugar diagnostic Strp To check BG once daily, to use with insurance preferred meter 30 each 5    carbamazepine (TEGRETOL) 200 mg tablet TK 2 TS PO BID  1    ciclopirox (LOPROX) 0.77 % Susp Apply topically once daily. 30 mL 1    ciclopirox-nail lacquer removr 8 % Kit Apply 1 application topically once a week. 1 kit 4    clonazePAM (KLONOPIN) 1 MG tablet TK 1 T PO 0.50mg BID PRA AND INSOMNIA  1    cyanocobalamin (VITAMIN B-12) 100 MCG tablet Take 1 tablet (100 mcg total) by mouth once daily. 90 tablet 1    fish oil-omega-3 fatty acids 300-1,000 mg capsule Take 2 capsules by mouth once daily. Instructed to stop 5-7 days before surgery (8/11/16). 60 capsule 5    fluticasone (FLONASE ALLERGY RELIEF) 50 mcg/actuation nasal spray 1 spray (50 mcg total) by Each Nare route once daily. 16 g 3    furosemide (LASIX) 20 MG tablet Take 1 tablet (20 mg total) by mouth once daily. 30 tablet 5    gabapentin (NEURONTIN) 600 MG tablet Take 1 tablet (600 mg total) by mouth 2 (two) times daily. 60 tablet 5    ibuprofen (ADVIL,MOTRIN) 600 MG tablet TAKE 1 TABLET(600 MG) BY MOUTH EVERY 8 HOURS AS NEEDED FOR PAIN 60 tablet 0    lancets Misc To check BG once daily, to use with insurance  preferred meter 30 each 2    lidocaine (LIDODERM) 5 % Place 1 patch onto the skin once daily. Remove & Discard patch within 12 hours or as directed by MD. May use 4% formulation if more affordable for patient. 6 patch 0    lisinopril (PRINIVIL,ZESTRIL) 5 MG tablet Take 1 tablet (5 mg total) by mouth once daily. 30 tablet 5    loratadine (CLARITIN) 10 mg tablet Take 1 tablet (10 mg total) by mouth once daily. 30 tablet 5    meloxicam (MOBIC) 15 MG tablet TAKE 1 TABLET(15 MG) BY MOUTH EVERY DAY 30 tablet 2    metFORMIN (GLUCOPHAGE) 1000 MG tablet TAKE 1 TABLET(1000 MG) BY MOUTH TWICE DAILY WITH MEALS 60 tablet 5    methocarbamol (ROBAXIN) 500 MG Tab TAKE 1 TABLET(500 MG) BY MOUTH EVERY 6 HOURS AS NEEDED 100 tablet 1    methocarbamol (ROBAXIN) 750 MG Tab TAKE 1 TABLET BY MOUTH EVERY 6 HOURS AS NEEDED 60 tablet 2    metoprolol tartrate (LOPRESSOR) 25 MG tablet Take 1 tablet (25 mg total) by mouth once daily. 30 tablet 5    mometasone-formoterol (DULERA) 100-5 mcg/actuation HFAA Inhale 2 puffs into the lungs 2 (two) times daily. Controller 13 g 5    multivitamin (THERAGRAN) per tablet Take 1 tablet by mouth every morning. 90 tablet 2    nicotine polacrilex 2 MG Lozg 1-2 per hour in place of cigarettes maximum of 20 per day. 168 lozenge 0    omeprazole 20 mg TbEC TAKE 2 TABLETS BY MOUTH ONCE DAILY AT 6AM 60 each 5    pravastatin (PRAVACHOL) 40 MG tablet Take 1 tablet (40 mg total) by mouth once daily. 30 tablet 5    risperidone (RISPERDAL) 3 MG Tab take at bedtime  1    VENTOLIN HFA 90 mcg/actuation inhaler INHLAE 2 PUFFS INTO LUNGS EVERY 6 HOURS AS NEEDED FOR WHEEZING 18 g 0    VYVANSE 40 mg Cap TK ONE C PO QAM  0    blood-glucose meter kit To check BG once daily, to use with insurance preferred meter 1 each 0     No current facility-administered medications on file prior to visit.        Review of patient's allergies indicates:   Allergen Reactions    Morphine Other (See Comments)     Patient had a  "psychotic episode after taking Morphine  Agitation, hallucinations    Penicillins Anaphylaxis     itching       Past Surgical History:   Procedure Laterality Date    ABDOMINAL SURGERY      BILATERAL OOPHORECTOMY Bilateral 1/12/2015    BIOPSY, BLADDER  9/18/2013    [facility]    CYSTOSCOPY N/A 9/18/2013    [facility]    ESOPHAGOGASTRODUODENOSCOPY (EGD) N/A 8/10/2016    [facility] at [facility]    ESOPHAGOGASTRODUODENOSCOPY (EGD) N/A 10/8/2014    Per[facility] Jarocho Fowler [facility] at [facility] ENDO    FULGURATION, BLADDER  9/18/2013    [facility]    GASTRECTOMY-SLEEVE-LAPAROSCOPIC N/A 8/10/2016    [facility] at [facility]    Green' s filter Right 7/4/2012    Right Neck & Tunneled Down.    HERNIA REPAIR      "[place] of Hernias Repaires around th Belly Button.", pt. states    IRRIGATION, BLADDER N/A 9/18/2013    [facility]    LAPAROSCOPY W/REMOVAL OF INFECTED MESH  4/13/2015    [facility] OR    LAPAROTOMY-EXPLORATORY Bilateral 1/12/2015    [facility] 2ND F[facility]    OOPHORECTOMY      OVARIAN CYST REMOVAL  3/13/2014    RI REMOVAL OF OVARY/TUBE(S)      REPAIR, HERNIA, VENTRAL, LAPAROSCOPIC N/A 3/14/2014    [facility] at [facility] OR    BJDOBDLG-BMGAXSAETGHP-USOJHFATN (BSO) Bilateral 1/12/2015    [facility] OR 2ND [facility]    SPLENECTOMY, TOTAL  July 2003    TONSILLECTOMY      as a child    TYMPANOSTOMY TUBE PLACEMENT  1976    VEIN SURGERY  2003    Lt leg       Family History   Problem Relation Age of Onset    Hypertension Father     Diabetes Father     Heart disease Father     Cataracts Father     Diabetes Paternal Grandfather     Heart disease Paternal Grandfather     No Known Problems Mother     Ovarian cancer " Maternal Grandmother          from this. ? age     No Known Problems Sister     No Known Problems Brother     No Known Problems Maternal Aunt     No Known Problems Maternal Uncle     No Known Problems Paternal Aunt     No Known Problems Paternal Uncle     No Known Problems Maternal Grandfather     Ovarian cancer Paternal Grandmother     Uterine cancer Neg Hx     Breast cancer Neg Hx     Colon cancer Neg Hx     Amblyopia Neg Hx     Blindness Neg Hx     Cancer Neg Hx     Glaucoma Neg Hx     Macular degeneration Neg Hx     Retinal detachment Neg Hx     Strabismus Neg Hx     Stroke Neg Hx     Thyroid disease Neg Hx        Social History     Socioeconomic History    Marital status:      Spouse name: Not on file    Number of children: Not on file    Years of education: Not on file    Highest education level: Not on file   Occupational History    Not on file   Social Needs    Financial resource strain: Not on file    Food insecurity:     Worry: Not on file     Inability: Not on file    Transportation needs:     Medical: Not on file     Non-medical: Not on file   Tobacco Use    Smoking status: Current Every Day Smoker     Packs/day: 0.50     Years: 25.00     Pack years: 12.50     Types: Cigarettes    Smokeless tobacco: Never Used   Substance and Sexual Activity    Alcohol use: No     Alcohol/week: 0.0 oz    Drug use: No    Sexual activity: Not Currently   Lifestyle    Physical activity:     Days per week: Not on file     Minutes per session: Not on file    Stress: Not on file   Relationships    Social connections:     Talks on phone: Not on file     Gets together: Not on file     Attends Hoahaoism service: Not on file     Active member of club or organization: Not on file     Attends meetings of clubs or organizations: Not on file     Relationship status: Not on file   Other Topics Concern    Not on file   Social History Narrative    Not on file       Review of Systems  "  Constitution: Negative for chills and fever.   Cardiovascular: Positive for leg swelling. Negative for claudication.   Respiratory: Negative for cough and shortness of breath.    Skin: Positive for dry skin and nail changes. Negative for itching and rash.   Musculoskeletal: Positive for arthritis, back pain, joint pain and myalgias. Negative for joint swelling and muscle weakness.   Gastrointestinal: Negative for diarrhea, nausea and vomiting.   Neurological: Positive for numbness and paresthesias. Negative for tremors and weakness.   Psychiatric/Behavioral: Negative for altered mental status and hallucinations.           Objective:      Vitals:    05/06/19 1515   BP: (!) 142/94   Weight: 95.7 kg (211 lb)   Height: 5' 6" (1.676 m)   PainSc:   6   PainLoc: Foot       Physical Exam   Constitutional:  Non-toxic appearance. She does not have a sickly appearance. No distress.   Cardiovascular:   Pulses:       Dorsalis pedis pulses are 2+ on the right side, and 2+ on the left side.        Posterior tibial pulses are 2+ on the right side, and 2+ on the left side.   Pulmonary/Chest: No respiratory distress.   Musculoskeletal:        Right ankle: She exhibits decreased range of motion and swelling. No tenderness. No lateral malleolus, no medial malleolus, no AITFL, no CF ligament and no posterior TFL tenderness found. Achilles tendon exhibits no pain, no defect and normal Paniagua's test results.        Left ankle: She exhibits decreased range of motion and swelling. Tenderness (anterior shin pain). No lateral malleolus, no medial malleolus, no AITFL, no CF ligament, no posterior TFL and no proximal fibula tenderness found. Achilles tendon exhibits no pain, no defect and normal Paniagua's test results.        Right foot: There is no bony tenderness.        Left foot: There is tenderness (sub 2nd-4th MTPJ) and swelling.   Biomechanical exam: There is equinus deformity bilateral with decreased dorsiflexion at the ankle joint " bilateral. No tenderness with compression of heel. Negative tinels sign. Gait analysis reveals excessive pronation through midstance and propulsion with early heel off. Shoes reveals lateral heel counter wear bilateral     Decreased first MPJ range of motion both weightbearing and nonweightbearing, no crepitus observed the first MP joint, + dorsal flag sign. Mild  bunion deformity is observed .    Patient has hammertoes of digits 2-5 bilateral partially reducible without symptom today.     Neurological: She has normal reflexes. She displays no atrophy and no tremor. No sensory deficit. She exhibits normal muscle tone.   Paresthesias, and hyperesthesia bilateral feet at toes with no clearly identified trigger or source.    Gibson-Gilberto 5.07 monofilament is intact bilateral feet. Sharp/dull sensation is also intact Bilateral feet.   Skin: Skin is warm, dry and intact. No bruising, no burn, no laceration, no lesion and no rash noted. She is not diaphoretic. No cyanosis. No pallor. Nails show no clubbing.   Examination of the skin reveals no evidence of significant rashes, open lesions, suspicious appearing nevi or other concerning lesions.      Psychiatric: Her mood appears not anxious. Her affect is not inappropriate. Her speech is not slurred. She is not combative. She is communicative. She is attentive.   Nursing note reviewed.            Assessment:       Encounter Diagnoses   Name Primary?    Left foot pain Yes    Left medial tibial stress syndrome, initial encounter     Hallux abducto valgus, left     Hammer toes of both feet     Hallux limitus, acquired, left     Hallux limitus, acquired, right     Type II diabetes mellitus with neurological manifestations          Plan:       Audrey was seen today for nail problem and foot pain.    Diagnoses and all orders for this visit:    Left foot pain    Left medial tibial stress syndrome, initial encounter    Hallux abducto valgus, left    Hammer toes of both  feet    Hallux limitus, acquired, left    Hallux limitus, acquired, right    Type II diabetes mellitus with neurological manifestations      I counseled the patient on her conditions, their implications and medical management.       Greater than 50% of this visit spent on counseling and coordination of care.    Education about the diabetic foot, neuropathy, and prevention of limb loss.    Shoe inspection. Diabetic Foot Education. Patient reminded of the importance of good nutrition/healthy diet/weight management and blood sugar control to help prevent podiatric complications of diabetes. Patient instructed on proper foot hygeine. Wear comfortable, proper fitting shoes. Wash feet daily. Dry well. After drying, apply moisturizer to feet (no lotion to webspaces). Inspect feet daily for skin breaks, blisters, swelling, or redness. Wear cotton socks (preferably white)  Change socks every day. Do NOT walk barefoot. Do NOT use heating pads or hot water soaks. We discussed wearing proper shoe gear, daily foot inspections, never walking without protective shoe gear.     Patient instructed to Cut down excessive ambulation and allow your legs to rest and the injury to heal. Patient instructed on adequate icing techniques. Patient should ice the affected area at least once per day x 10 minutes for 10 days . I advised the  patient that extra icing would also be beneficial to ensure adequate anti inflammatory effect.     Discussed wearing appropriate shoe gear and avoiding flats, slippers, sandals, and going barefoot. My recommendation for OTC supports is [facility].  Advised patient on wearing compression wraps, tights during activity. Advised patient to warm up before and after exercise or increased activity. Gradually return to exercise level; careful not to overtrain.    She will continue to monitor the areas daily, inspect his feet, wear protective shoe gear when ambulatory, moisturizer to maintain skin integrity  and follow in this office in approximately 6 months, sooner p.r.n.

## 2019-05-06 NOTE — PATIENT INSTRUCTIONS
.  Shin Splints (Medial Tibial Stress Syndrome)  Pain felt in the front of your lower leg is often called shin splints. One common cause of this pain is tendinitis--inflammation of tendons (tough, cordlike bands of tissue that connect muscle to bone). When the tendons of the muscles near the shinbone (tibia) become inflamed, the pain is felt along the shin. Shin splints often affect athletes and runners, and are commonly due to overuse. A less common cause is flat feet with low arches.  Symptoms of shin splints  Symptoms of shin splints often start as a dull ache that gets worse over time. Pain may also be sharp or stabbing. Resting your legs often relieves the symptoms. Pain may occur both during or after activity. Later, the pain may become continuous with almost any activity.  Your evaluation  Your doctor will ask you questions about your activities and your health history. Be sure to tell your doctor about possible injuries. The diagnosis is usually made through the history and physical exam. There are no tests for shin splints, but your doctor may want to do some tests to rule out a stress fracture in your shinbone. These tests may include an X-ray, bone scan, or MRI (magnetic resonance imaging) test.    Treating shin splints  Follow these and any other instructions you are given.  · Rest: Cut down on running and high-impact sports, or avoid them completely to allow your legs to rest and the injury to heal.  · Ice: Put ice on the painful areas. Use an ice pack or bag of frozen peas. Put a thin cloth between the cold source and your skin. Ice for 15 minutes every 3 hours.  · Medications: Take nonsteroidal anti-inflammatory medicines (NSAIDs), such as ibuprofen, as directed by your doctor.  Preventing shin splints  To help prevent shin splints in the future:  · Warm up before you run. Do gentle calf-stretching exercises.  · Be careful not to overtrain.  · Avoid running on hard or uneven surfaces.  · If you have  flat feet or low arches, consider orthotics or insoles for correction.  Be sure you are using running shoes with good support and cushioned soles.  Date Last Reviewed: 11/10/2015  © 9564-1546 [employer]. [address] [place] [address], [place] [address]. All rights reserved. This information is not intended as a substitute for professional medical care. Always follow your healthcare professional's instructions.      Recommend lotions: eucerin, eucerin for diabetics, aquaphor, A&D ointment, gold bond for diabetics, sween, Surya's Bees all purpose baby ointment,  urea 40 with aloe (found on amazon.com)    Shoe recommendations: (try 6pm.[employer], zappos.[employer] , nordstromrackvpod.tv, or shoes.[employer] for discounted prices) you can visit DSW shoes in [place]  or [employer] in the [facility] (there are also several shoe brand outlets in the [facility])    Asics (GT 2000 or gel foundations), new balance stability type shoes, saucony (stabil c3),  Shi (GTS or Beast or transcend), propet (tennis shoe)    Sofft Brand (women) Steven&Tee (men), clarks, crocs, aerosoles, naturalizers, SAS, ecco, born, jason gurrola, rockports (dress shoes)    Vionic, burkenstocks, fitflops, propet (sandals)  Nike comfort thong sandals, crocs, propet (house shoes)    Nail Home remedy:  Vicks Vapor rub to nails for easier manageability      Diabetes: Inspecting Your Feet  Diabetes increases your chances of developing foot problems. So inspect your feet every day. This helps you find small skin irritations before they become serious infections. If you have trouble seeing the bottoms of your feet, use a mirror or ask a family member or friend to help.     Pressure spots on the bottom of the foot are common areas where problems develop.   How to check your feet  Below are tips to help you look for foot problems. Try to check your feet at the same time each day, such as when you get out of bed in the morning:  · Check the top of each foot. The tops of  toes, back of the heel, and outer edge of the foot can get a lot of rubbing from poor-fitting shoes.  · Check the bottom of each foot. Daily wear and tear often leads to problems at pressure spots.  · Check the toes and nails. Fungal infections often occur between toes. Toenail problems can also be a sign of fungal infections or lead to breaks in the skin.  · Check your shoes, too. Loose objects inside a shoe can injure the foot. Use your hand to feel inside your shoes for things like tami, loose stitching, or rough areas that could irritate your skin.  Warning signs  Look for any color changes in the foot. Redness with streaks can signal a severe infection, which needs immediate medical attention. Tell your doctor right away if you have any of these problems:  · Swelling, sometimes with color changes, may be a sign of poor blood flow or infection. Symptoms include tenderness and an increase in the size of your foot.  · Warm or hot areas on your feet may be signs of infection. A foot that is cold may not be getting enough blood.  · Sensations such as burning, tingling, or pins and needles can be signs of a problem. Also check for areas that may be numb.  · Hot spots are caused by friction or pressure. Look for hot spots in areas that get a lot of rubbing. Hot spots can turn into blisters, calluses, or sores.  · Cracks and sores are caused by dry or irritated skin. They are a sign that the skin is breaking down, which can lead to infection.  · Toenail problems to watch for include nails growing into the skin (ingrown toenail) and causing redness or pain. Thick, yellow, or discolored nails can signal a fungal infection.  · Drainage and odor can develop from untreated sores and ulcers. Call your doctor right away if you notice white or yellow drainage, bleeding, or unpleasant odor.   © 3076-2901 [employer]. [address] [place] [address], [place] [address]. All rights reserved. This information is not  intended as a substitute for professional medical care. Always follow your healthcare professional's instructions.        Step-by-Step:  Inspecting Your Feet (Diabetes)    Date Last Reviewed: 10/1/2016  © 9137-4992 [employer]. [address] [place] [address], [place] [address]. All rights reserved. This information is not intended as a substitute for professional medical care. Always follow your healthcare professional's instructions.

## 2019-05-08 ENCOUNTER — CLINICAL SUPPORT (OUTPATIENT)
Dept: REHABILITATION | Facility: HOSPITAL | Age: 49
End: 2019-05-08
Attending: INTERNAL MEDICINE
Payer: MEDICAID

## 2019-05-08 DIAGNOSIS — R29.898 WEAKNESS OF LOWER EXTREMITY, UNSPECIFIED LATERALITY: ICD-10-CM

## 2019-05-08 DIAGNOSIS — M53.86 DECREASED ROM OF LUMBAR SPINE: ICD-10-CM

## 2019-05-08 PROCEDURE — 97110 THERAPEUTIC EXERCISES: CPT | Mod: PN

## 2019-05-08 NOTE — PROGRESS NOTES
"  [facility]      Name: Audrey Natarajan  Clinic Number: [id number]    Date of Treatment: 2019     Diagnosis:   Encounter Diagnoses   Name Primary?    Weakness of lower extremity, unspecified laterality     Decreased ROM of lumbar spine      Physician: Donaldo Pena MD    Precautions: Fall     Pattern of pain determined: Pattern 1     Evaluation: 19  Authorization period: 2019  Plan of care Expiration: 2019  Reassessment Due: 19  Visit # / Visits authorized: 9    Time In: 1035  Time Out: 1130  Total Billable Time: 40 minutes     Subjective     Audrey reports that she has been cleaning her ceilings and walls at home because she plans to stop smoking on . Patient notes increased low back pain and stiffness this morning.    Patient reports tolerating previous visit well.  No increase in symptoms.  Patient reports their pain to be 4/10 on a 0-10 scale with 0 being no pain and 10 being the worst pain imaginable.  Pain Location: low back and L hip     Occupation:  None    Leisure: tv, clean house                      Pts goals:  "I was still doing all the stretches since the last time I came here, so I just want to have decreased pain"     Objective     Baseline IM Testing Results:   Date of testin19  ROM 42 deg   Max Peak Torque 161    Min Peak Torque 49    Flex/Ext Ratio 3.29   % below normative data 39     ROM Reassessed 19: 0-45 degrees    Outcomes:  CMS Impairment/Limitation/Restriction for FOTO Lumbar Spine Survey  Status Limitation G-Code CMS Severity Modifier  Intake 37% 63%  Predicted 43% 57% Goal Status+ CK - At least 40 percent but less than 60 percent  3/6/2019 40% 60%  2019 48% 52% Current Status CK - At least 40 percent but less than 60 percent  D/C Status CK **only report if this is discharge survey  +Based on FOTO predicted change score     Treatment      Pt was instructed in and performed the following:     Audrey " "received therapeutic exercises to develop/improved posture, cardiovascular endurance, muscular endurance, lumbar/cervical ROM, strength and muscular endurance for 55 minutes including the following exercises:     TALIA on SB x 20  LTR on SB x 20  SL opening mob over BOSU 2 x 30" B    [facility] 5/8/2019   Visit Number 10   VAS Pain Rating 5   Treadmill Time (in min.) 10   Speed 1.6   Incline 0   Flexion in Lying 20   Manual Therapy 0   Lumbar Weight 69   Repetitions 20   Rating of Perceived Exertion 3   Ice - Z Lie (in min.) 0       Peripheral muscle strengthening which included 1 set of 15-20 repetitions at a slow, controlled 7 second per rep pace focused on strengthening supporting musculature for improved body mechanics and functional mobility.  Pt and therapist focused on proper form during treatment to ensure optimal strengthening of each targeted muscle group.  Machines were utilized including torso rotation, leg extension, leg curl, chest press, upright row. Tricep extension, bicep curl, leg press, and hip abduction/adduction.    Audrey received the following manual therapy techniques: 00 minutes     Home Exercise Program as follows:   Pt demo good understanding of the education provided. Audrey demonstrated good return demonstration of activities.   Lumbar roll use compliance: NA  Additional exercises taught this treatment session: none    Assessment     Patient able to increase to 69 ft/lbs on MedX Lumbar Extension machine reporting appropriate muscle fatigue. Good response to peripheral machines. Patient is progressing well with current POC.     Patient is making fair progress towards established goals.  Pt will continue to benefit from skilled outpatient physical therapy to address the deficits stated in the impairment chart, provide pt/family education and to maximize pt's level of independence in the home and community environment.     Pt's spiritual, cultural and educational needs considered and " pt agreeable to plan of care and goals as stated below:     Medical necessity is demonstrated by the following problem list.    Pt presents with the following impairments:      GOALS: Pt is in agreement with the following goals.     Short term goals:  6 weeks or 10 visits   1.  Pt will demonstrate increased lumbar ROM by at least 5 degrees from the initial ROM value with improvements noted in functional ROM and ability to perform ADLs.  Appropriate, ongoing  2.  Pt will demonstrate increased maximum isometric torque value by % when compared to the initial value resulting in improved ability to perform bending, lifting, and carrying activities safely, confidently. - will perform next visit and make goal. Appropriate, ongoing  3.  Patient report a reduction in worst pain score by 3-4 points for improved tolerance during work and recreational activities. Appropriate, ongoing  4. Pt to improve BLE by 1/2 MMT to improve functional mobility and participation in ADLs. Appropriate, ongoing  5.  Pt able to perform HEP correctly with minimal cueing or supervision for therapist. Appropriate, ongoing     Long term goals: 13 weeks or 20 visits   1. Pt will demonstrate increased lumbar flexion by at least 10 degrees from initial ROM value, resulting in improved ability to perform functional fwd bending while standing and sitting. Appropriate, ongoing  2. Pt will demonstrate increased maximum isometric torque value by % when compared to the initial value resulting in improved ability to perform bending, lifting, and carrying activities safely, confidently. Will assess next visit and make goal. Appropriate, ongoing  3. Pt to demonstrate ability to independently control and reduce their pain through posture positioning and mechanical movements throughout a typical day.Appropriate, ongoing  4. Pt to improve BLE by 1 MMT to improve functional mobility and participation in ADLs. Appropriate, ongoing  4.  Patient will demonstrate  improved overall function per FOTO Survey to CK = at least 40% but < 60% impaired, limited or restricted score or less.Appropriate, ongoing    Plan     Continue with established Plan of Care towards established PT goals.   Progress with MedX resistance at next visit.     Kelli Prakash, PTA  05/08/2019

## 2019-05-09 ENCOUNTER — HOSPITAL ENCOUNTER (EMERGENCY)
Facility: HOSPITAL | Age: 49
Discharge: HOME | End: 2019-05-09
Attending: EMERGENCY MEDICINE
Payer: MEDICAID

## 2019-05-09 VITALS
BODY MASS INDEX: 33.75 KG/M2 | DIASTOLIC BLOOD PRESSURE: 86 MMHG | WEIGHT: 210 LBS | HEIGHT: 66 IN | TEMPERATURE: 98 F | RESPIRATION RATE: 18 BRPM | OXYGEN SATURATION: 98 % | HEART RATE: 79 BPM | SYSTOLIC BLOOD PRESSURE: 128 MMHG

## 2019-05-09 DIAGNOSIS — S39.012A STRAIN OF LUMBAR REGION, INITIAL ENCOUNTER: ICD-10-CM

## 2019-05-09 DIAGNOSIS — S16.1XXA STRAIN OF NECK MUSCLE, INITIAL ENCOUNTER: Primary | ICD-10-CM

## 2019-05-09 PROCEDURE — 25000003 PHARM REV CODE 250: Performed by: PHYSICIAN ASSISTANT

## 2019-05-09 PROCEDURE — 99283 EMERGENCY DEPT VISIT LOW MDM: CPT

## 2019-05-09 RX ORDER — ACETAMINOPHEN 325 MG/1
650 TABLET ORAL EVERY 6 HOURS PRN
Qty: 12 TABLET | Refills: 0 | Status: SHIPPED | OUTPATIENT
Start: 2019-05-09 | End: 2019-05-12

## 2019-05-09 RX ORDER — ACETAMINOPHEN 325 MG/1
650 TABLET ORAL
Status: COMPLETED | OUTPATIENT
Start: 2019-05-09 | End: 2019-05-09

## 2019-05-09 RX ADMIN — ACETAMINOPHEN 650 MG: 325 TABLET, FILM COATED ORAL at 08:05

## 2019-05-10 DIAGNOSIS — J44.9 CHRONIC OBSTRUCTIVE PULMONARY DISEASE, UNSPECIFIED COPD TYPE: Chronic | ICD-10-CM

## 2019-05-10 RX ORDER — ALBUTEROL SULFATE 90 UG/1
AEROSOL, METERED RESPIRATORY (INHALATION)
Qty: 18 G | Refills: 0 | Status: SHIPPED | OUTPATIENT
Start: 2019-05-10 | End: 2020-01-21 | Stop reason: SDUPTHER
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
## 2019-05-13 ENCOUNTER — OFFICE VISIT (OUTPATIENT)
Dept: URGENT CARE | Facility: CLINIC | Age: 49
End: 2019-05-13
Payer: MEDICAID

## 2019-05-13 ENCOUNTER — TELEPHONE (OUTPATIENT)
Dept: FAMILY MEDICINE | Facility: CLINIC | Age: 49
End: 2019-05-13

## 2019-05-13 VITALS
WEIGHT: 210 LBS | HEART RATE: 84 BPM | OXYGEN SATURATION: 96 % | DIASTOLIC BLOOD PRESSURE: 76 MMHG | SYSTOLIC BLOOD PRESSURE: 130 MMHG | HEIGHT: 66 IN | BODY MASS INDEX: 33.75 KG/M2 | TEMPERATURE: 99 F

## 2019-05-13 DIAGNOSIS — M54.42 CHRONIC BILATERAL LOW BACK PAIN WITH BILATERAL SCIATICA: ICD-10-CM

## 2019-05-13 DIAGNOSIS — M54.41 CHRONIC BILATERAL LOW BACK PAIN WITH BILATERAL SCIATICA: ICD-10-CM

## 2019-05-13 DIAGNOSIS — M54.2 NECK PAIN, ACUTE: ICD-10-CM

## 2019-05-13 DIAGNOSIS — G89.29 CHRONIC BILATERAL LOW BACK PAIN WITH BILATERAL SCIATICA: ICD-10-CM

## 2019-05-13 DIAGNOSIS — S16.1XXD STRAIN OF NECK MUSCLE, SUBSEQUENT ENCOUNTER: Primary | ICD-10-CM

## 2019-05-13 PROCEDURE — 72040 X-RAY EXAM NECK SPINE 2-3 VW: CPT | Mod: S$GLB,,, | Performed by: RADIOLOGY

## 2019-05-13 PROCEDURE — 99214 PR OFFICE/OUTPT VISIT, EST, LEVL IV, 30-39 MIN: ICD-10-PCS | Mod: S$GLB,,, | Performed by: PHYSICIAN ASSISTANT

## 2019-05-13 PROCEDURE — 72040 XR CERVICAL SPINE 2 OR 3 VIEWS: ICD-10-PCS | Mod: S$GLB,,, | Performed by: RADIOLOGY

## 2019-05-13 PROCEDURE — 99214 OFFICE O/P EST MOD 30 MIN: CPT | Mod: S$GLB,,, | Performed by: PHYSICIAN ASSISTANT

## 2019-05-13 NOTE — TELEPHONE ENCOUNTER
----- Message from Margarita Goodman sent at 5/13/2019  3:53 PM CDT -----  Contact: pt  Type: Patient Call Back    Who called:pt    What is the request in detail:pt needs a letter saying it is ok to continue healthy back. Call pt    Can the clinic reply by [facility]?    Would the patient rather a call back or a response via [facility]? Call back    Best call back number:[phone]    Additional Information:

## 2019-05-13 NOTE — PATIENT INSTRUCTIONS
General Discharge Instructions   If you were prescribed a narcotic or controlled medication, do not drive or operate heavy equipment or machinery while taking these medications.  If you were prescribed antibiotics, please take them to completion.  You must understand that you've received an Urgent Care treatment only and that you may be released before all your medical problems are known or treated. You, the patient, will arrange for follow up care as instructed.  Follow up with your PCP or specialty clinic as directed in the next 1-2 weeks if not improved or as needed.  You can call [phone] to schedule an appointment with the appropriate provider.  If your condition worsens we recommend that you receive another evaluation at the emergency room immediately or contact your primary medical clinics after hours call service to discuss your concerns.  Please return here or go to the Emergency Department for any concerns or worsening of condition.      Neck Sprain or Strain  A sudden force that causes turning or bending of the neck can cause sprain or strain. An example would be the force from a car accident. This can stretch or tear muscles called a strain. It can also stretch or tear ligaments called a sprain. Either of these can cause neck pain. Sometimes neck pain occurs after a simple awkward movement. In either case, muscle spasm is commonly present and contributes to the pain.     Unless you had a forceful physical injury (for example, a car accident or fall), X-rays are usually not ordered for the initial evaluation of neck pain. If pain continues and dose not respond to medical treatment, X-rays and other tests may be performed at a later time.  Home care  · You may feel more soreness and spasm the first few days after the injury. Rest until symptoms begin to improve.  · When lying down, use a comfortable pillow or a rolled towel that supports the head and keeps the spine in a neutral position. The  position of the head should not be tilted forward or backward.  · Apply an ice pack over the injured area for 15 to 20 minutes every 3 to 6 hours. You should do this for the first 24 to 48 hours. You can make an ice pack by filling a plastic bag that seals at the top with ice cubes and then wrapping it with a thin towel. After 48 hours, apply heat (warm shower or warm bath) for 15 to 20 minutes several times a day, or alternate ice and heat.  · You may use over-the-counter pain medicine to control pain, unless another pain medicine was prescribed. If you have chronic liver or kidney disease or ever had a stomach ulcer or GI bleeding, talk with your healthcare provider before using these medicines.  · If a soft cervical collar was prescribed, it should be worn only for periods of increased pain. It should not be worn for more than 3 hours a day, or for a period longer than 1 to 2 weeks.  Follow-up care  Follow up with your healthcare provider as directed. Physical therapy may be needed.  Sometimes fractures dont show up on the first X-ray. Bruises and sprains can sometimes hurt as much as a fracture. These injuries can take time to heal completely. If your symptoms dont improve or they get worse, talk with your healthcare provider. You may need a repeat X-ray or other tests. If X-rays were taken, you will be told of any new findings that may affect your care.  Call 911  Call 911 if you have:  · Neck swelling, difficulty or painful swallowing  · Difficulty breathing  · Chest pain  When to seek medical advice  Call your healthcare provider right away if any of these occur:  · Pain becomes worse or spreads into your arms  · Weakness or numbness in one or both arms  Date Last Reviewed: 11/19/2015  © 1549-9147 [employer]. [address] [place] [address], [place] [address]. All rights reserved. This information is not intended as a substitute for professional medical care. Always follow your healthcare  professional's instructions.      Understanding Lumbosacral Strain    Lumbosacral strain is a medical term for an injury that causes low back pain. The lumbosacral area (low back) is between the bottom of the ribcage and the top of the buttocks. A strain is tearing of muscles and tendons. These tears can be very small but still cause pain.  How a lumbosacral strain happens  Muscles and tendons connected to the spine can be strained in a number of ways:  · Sitting or standing in the same position for long periods of time. This can harm the low back over time. Poor posture can make low back pain more likely.  · Moving the muscles and tendons past their usual range of motion. This can cause a sudden injury. This can happen when you twist, bend over, or lift something heavy. Not using correct technique for sports or tasks like lifting can make back injury more likely.  · Accidents or falls  Lumbosacral strain can be caused by other problems, but these are less common.  Symptoms of lumbosacral strain  Symptoms may include:  · Pain in the back, often on one side  · Pain that gets worse with movement and gets better with rest  · Inability to move as freely as usual  · Swelling, slight redness, and skin warmth in the painful area  Treatment for lumbosacral strain  Low back pain often goes away by itself within several weeks. But it often comes back. Treatment focuses on reducing pain and avoiding further injury. Bed rest is usually not recommended for low back pain. Treatments may include:  · Avoiding or changing the action that caused the problem. This helps prevent injuring the tissues again.  · Prescription or over-the-counter pain medicines. These help reduce inflammation, swelling, and pain.  · Cold or heat packs. These help reduce pain and swelling.  · Stretching and other exercises. These improve flexibility and strength.  · Physical therapy. This usually includes exercises and other treatments.  · Injections of  medicine. This may relieve symptoms.  If these treatments do not relieve symptoms, your healthcare provider may order imaging tests to learn more about the problem. Sometimes you may need surgery.  Possible complications of lumbosacral strain  If the cause of the pain is not addressed, symptoms may return or get worse. Follow your healthcare providers instructions on lifestyle changes and treating your back.     When to call your healthcare provider  Call your healthcare provider right away if you have any of these:  · Fever of 100.4°F (38°C) or higher, or as directed  · Numbness, tingling, or weakness  · Problems with bowel or bladder control, or problems having sex  · Pain that does not go away, or gets worse  · New symptoms   Date Last Reviewed: 3/10/2016  © 0620-1357 [employer]. [address] [place] [address], [place] [address]. All rights reserved. This information is not intended as a substitute for professional medical care. Always follow your healthcare professional's instructions.
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
## 2019-05-13 NOTE — LETTER
May 13, 2019      [facility] [address], [place] [address]  Phone: [phone]  Fax: [phone]       Patient: Audrey Natarajan   YOB: 1970  Date of Visit: 05/13/2019    To Whom It May Concern:    Polly Natarajan  was at [facility] on 05/13/2019. She may return to therapy on 5/14/2019 with no restrictions. If you have any questions or concerns, or if I can be of further assistance, please do not hesitate to contact me.    Sincerely,      Laura Arredondo PA-C

## 2019-05-13 NOTE — TELEPHONE ENCOUNTER
"Pt needs letter stating that its ok for her to continue [facility] " program . She states she went to a urgent care since she couldn't see Dr. Pena sooner.      "

## 2019-05-13 NOTE — LETTER
May 14, 2019    Audrey Natarajan  [address] [facility] [address] [facility] [address]         [facility]  [address] [address]  Phone: [phone] May 14, 2019     Patient: Audrey Natarajan   YOB: 1970   Date of Visit: 5/13/2019       To Whom It May Concern:    It is my medical opinion that Audrey Natarajan suffered an acute soft tissue injury. She should continue with physical therapy in the healthy back program. .    If you have any questions or concerns, please don't hesitate to call.    Sincerely,        Donaldo Pena MD

## 2019-05-15 ENCOUNTER — CLINICAL SUPPORT (OUTPATIENT)
Dept: REHABILITATION | Facility: HOSPITAL | Age: 49
End: 2019-05-15
Attending: INTERNAL MEDICINE
Payer: MEDICAID

## 2019-05-15 DIAGNOSIS — M53.86 DECREASED ROM OF LUMBAR SPINE: ICD-10-CM

## 2019-05-15 DIAGNOSIS — J44.9 CHRONIC OBSTRUCTIVE PULMONARY DISEASE, UNSPECIFIED COPD TYPE: Chronic | ICD-10-CM

## 2019-05-15 DIAGNOSIS — R29.898 WEAKNESS OF LOWER EXTREMITY, UNSPECIFIED LATERALITY: ICD-10-CM

## 2019-05-15 PROCEDURE — 97110 THERAPEUTIC EXERCISES: CPT | Mod: PN

## 2019-05-15 RX ORDER — MOMETASONE FUROATE AND FORMOTEROL FUMARATE DIHYDRATE 100; 5 UG/1; UG/1
AEROSOL RESPIRATORY (INHALATION)
Qty: 13 G | Refills: 2 | Status: SHIPPED | OUTPATIENT
Start: 2019-05-15 | End: 2019-08-12 | Stop reason: SDUPTHER
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
## 2019-05-16 DIAGNOSIS — K21.9 GASTROESOPHAGEAL REFLUX DISEASE WITHOUT ESOPHAGITIS: ICD-10-CM

## 2019-05-16 RX ORDER — METFORMIN HYDROCHLORIDE 1000 MG/1
TABLET ORAL
Qty: 60 TABLET | Refills: 5 | Status: SHIPPED | OUTPATIENT
Start: 2019-05-16 | End: 2019-11-27 | Stop reason: SDUPTHER

## 2019-05-17 ENCOUNTER — CLINICAL SUPPORT (OUTPATIENT)
Dept: REHABILITATION | Facility: HOSPITAL | Age: 49
End: 2019-05-17
Attending: INTERNAL MEDICINE
Payer: MEDICAID

## 2019-05-17 DIAGNOSIS — R29.898 WEAKNESS OF LOWER EXTREMITY, UNSPECIFIED LATERALITY: ICD-10-CM

## 2019-05-17 DIAGNOSIS — M53.86 DECREASED ROM OF LUMBAR SPINE: ICD-10-CM

## 2019-05-17 PROCEDURE — 97110 THERAPEUTIC EXERCISES: CPT | Mod: PN

## 2019-05-17 PROCEDURE — 97750 PHYSICAL PERFORMANCE TEST: CPT | Mod: PN
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
## 2019-05-17 NOTE — PROGRESS NOTES
"  [facility]      Name: Audrey Natarajan  Clinic Number: [id number]    Date of Treatment: 2019     Diagnosis:   Encounter Diagnoses   Name Primary?    Weakness of lower extremity, unspecified laterality     Decreased ROM of lumbar spine      Physician: Donaldo Pena MD    Precautions: Fall     Pattern of pain determined: Pattern 1     Evaluation: 19  Authorization period: 2019  Plan of care Expiration: 2019  Reassessment Due: 19  Visit # / Visits authorized:  (new referral; total visits 15)    Time In: 1:10 am  Time Out: 12:05 pm  Total Billable Time: 45 minutes     Subjective     Audrey reports that  Lower back pain cont to improve. Pt states she has moderated to min lower back pain. Pt states neck pain decreased a lot since initial eval.     Patient reports tolerating previous visit well.  No increase in symptoms.  Patient reports their pain to be 5/10 on a 0-10 scale with 0 being no pain and 10 being the worst pain imaginable.  Pain Location: low back and L hip     Occupation:  None    Leisure: tv, clean house                      Pts goals:  "I was still doing all the stretches since the last time I came here, so I just want to have decreased pain"     Objective     Baseline IM Testing Results:   Date of testin19  ROM 42 deg   Max Peak Torque 161    Min Peak Torque 49    Flex/Ext Ratio 3.29   % below normative data 39     Mid-point IM Testing Results:   Date of testin2019  ROM 0 to 42 deg   Max Peak Torque 118 ft.lbs   Min Peak Torque 78 ft.lbs   Flex/Ext Ratio 3.29   % below normative data 23%   Test Percent Gain in Strength from Initial 154%       Outcomes:  CMS Impairment/Limitation/Restriction for FOTO Lumbar Spine Survey  Status Limitation G-Code CMS Severity Modifier  Intake 37% 63%  Predicted 43% 57% Goal Status+ CK - At least 40 percent but less than 60 percent  3/6/2019 40% 60%  2019 48% 52% Current Status CK " "- At least 40 percent but less than 60 percent  D/C Status CK **only report if this is discharge survey  +Based on FOTO predicted change score     Treatment      Pt was instructed in and performed the following:     Audrey received therapeutic exercises to develop/improved posture, cardiovascular endurance, muscular endurance, lumbar/cervical ROM, strength and muscular endurance for 30 minutes including the following exercises:     TALIA on SB x 20  LTR on SB x 20  SL opening mob over BOSU 2 x 30" B    [facility] 5/20/2019   Visit Number 12   VAS Pain Rating 5   Treadmill Time (in min.) 10   Speed 1.6   Incline 0   Flexion in Lying -   Manual Therapy -   Lumbar Extension Seat Pad -   Femur Restraint -   Top Dead Center -   Counterweight -   Lumbar Flexion -   Lumbar Extension -   Lumbar Peak Torque -   Min Torque -   Test Percent Below Normative Data -   Test Percent Gain in Strength from Initial  -   Lumbar Weight 69   Repetitions 20   Rating of Perceived Exertion 3   Ice - Z Lie (in min.) 0       Peripheral muscle strengthening which included 1 set of 15-20 repetitions at a slow, controlled 7 second per rep pace focused on strengthening supporting musculature for improved body mechanics and functional mobility.  Pt and therapist focused on proper form during treatment to ensure optimal strengthening of each targeted muscle group.  Machines were utilized including torso rotation, leg extension, leg curl, chest press, upright row. Tricep extension, bicep curl, leg press, and hip abduction/adduction. NP due to increase in pain    Audrey received the following manual therapy techniques: 00 minutes     Home Exercise Program as follows:   Pt demo good understanding of the education provided. Audrey demonstrated good return demonstration of activities.   Lumbar roll use compliance: NA  Additional exercises taught this treatment session: none    Assessment     Pt tolerated therapy well today. Pt was able to " tolerated stretching techniques with no increase of lower back pain. Pt cont with lower back pain, but pain has bee subsiding. Pt tolerated Medx lumbar extension 69 ft.lbs, 20 reps and RPE of 3. Pt did not demonstrated increased in lower back pain. Pt was avle to tolerated UE and LE peripheral muscle strengthening with no adverse effects. Plan to cont heathy back protocol.     Patient is making fair progress towards established goals.  Pt will continue to benefit from skilled outpatient physical therapy to address the deficits stated in the impairment chart, provide pt/family education and to maximize pt's level of independence in the home and community environment.     Pt's spiritual, cultural and educational needs considered and pt agreeable to plan of care and goals as stated below:     Medical necessity is demonstrated by the following problem list.    Pt presents with the following impairments:      GOALS: Pt is in agreement with the following goals.     Short term goals:  6 weeks or 10 visits  Updated 5/17/2019  1.  Pt will demonstrate increased lumbar ROM by at least 5 degrees from the initial ROM value with improvements noted in functional ROM and ability to perform ADLs.  Goal in progress  2.  Pt will demonstrate increased maximum isometric torque value by % when compared to the initial value resulting in improved ability to perform bending, lifting, and carrying activities safely, confidently. - Goal in progress  3.  Patient report a reduction in worst pain score by 3-4 points for improved tolerance during work and recreational activities. Goal in progress  4. Pt to improve BLE by 1/2 MMT to improve functional mobility and participation in ADLs. Goal to be assessed   5.  Pt able to perform HEP correctly with minimal cueing or supervision for therapist. Goal met     Long term goals: 13 weeks or 20 visits   1. Pt will demonstrate increased lumbar flexion by at least 10 degrees from initial ROM value,  resulting in improved ability to perform functional fwd bending while standing and sitting. Appropriate, ongoing  2. Pt will demonstrate increased maximum isometric torque value by % when compared to the initial value resulting in improved ability to perform bending, lifting, and carrying activities safely, confidently. Will assess next visit and make goal. Appropriate, ongoing  3. Pt to demonstrate ability to independently control and reduce their pain through posture positioning and mechanical movements throughout a typical day.Appropriate, ongoing  4. Pt to improve BLE by 1 MMT to improve functional mobility and participation in ADLs. Appropriate, ongoing  4.  Patient will demonstrate improved overall function per FOTO Survey to CK = at least 40% but < 60% impaired, limited or restricted score or less.Appropriate, ongoing    Plan     Continue with established Plan of Care towards established PT goals.   Progress with MedX resistance at next visit.     Mayur He, PT   05/20/2019

## 2019-05-20 ENCOUNTER — CLINICAL SUPPORT (OUTPATIENT)
Dept: REHABILITATION | Facility: HOSPITAL | Age: 49
End: 2019-05-20
Attending: INTERNAL MEDICINE
Payer: MEDICAID

## 2019-05-20 DIAGNOSIS — R29.898 WEAKNESS OF LOWER EXTREMITY, UNSPECIFIED LATERALITY: ICD-10-CM

## 2019-05-20 DIAGNOSIS — M53.86 DECREASED ROM OF LUMBAR SPINE: ICD-10-CM

## 2019-05-20 PROCEDURE — 97110 THERAPEUTIC EXERCISES: CPT | Mod: PN

## 2019-05-21 ENCOUNTER — OFFICE VISIT (OUTPATIENT)
Dept: URGENT CARE | Facility: CLINIC | Age: 49
End: 2019-05-21
Payer: MEDICAID

## 2019-05-21 VITALS
OXYGEN SATURATION: 98 % | DIASTOLIC BLOOD PRESSURE: 70 MMHG | HEART RATE: 97 BPM | WEIGHT: 210 LBS | SYSTOLIC BLOOD PRESSURE: 130 MMHG | BODY MASS INDEX: 33.75 KG/M2 | TEMPERATURE: 97 F | HEIGHT: 66 IN | RESPIRATION RATE: 18 BRPM

## 2019-05-21 DIAGNOSIS — M54.42 CHRONIC BILATERAL LOW BACK PAIN WITH BILATERAL SCIATICA: ICD-10-CM

## 2019-05-21 DIAGNOSIS — M54.41 CHRONIC BILATERAL LOW BACK PAIN WITH BILATERAL SCIATICA: ICD-10-CM

## 2019-05-21 DIAGNOSIS — G89.29 CHRONIC BILATERAL LOW BACK PAIN WITH BILATERAL SCIATICA: ICD-10-CM

## 2019-05-21 DIAGNOSIS — B96.89 ACUTE BACTERIAL SINUSITIS: ICD-10-CM

## 2019-05-21 DIAGNOSIS — S16.1XXD NECK STRAIN, SUBSEQUENT ENCOUNTER: ICD-10-CM

## 2019-05-21 DIAGNOSIS — G43.019 INTRACTABLE MIGRAINE WITHOUT AURA AND WITHOUT STATUS MIGRAINOSUS: Primary | ICD-10-CM

## 2019-05-21 DIAGNOSIS — J01.90 ACUTE BACTERIAL SINUSITIS: ICD-10-CM

## 2019-05-21 PROCEDURE — 99214 OFFICE O/P EST MOD 30 MIN: CPT | Mod: 25,S$GLB,, | Performed by: PHYSICIAN ASSISTANT

## 2019-05-21 PROCEDURE — 99214 PR OFFICE/OUTPT VISIT, EST, LEVL IV, 30-39 MIN: ICD-10-PCS | Mod: 25,S$GLB,, | Performed by: PHYSICIAN ASSISTANT

## 2019-05-21 RX ORDER — DOXYCYCLINE 100 MG/1
100 CAPSULE ORAL EVERY 12 HOURS
Qty: 20 CAPSULE | Refills: 0 | Status: SHIPPED | OUTPATIENT
Start: 2019-05-21 | End: 2019-05-31

## 2019-05-21 RX ORDER — KETOROLAC TROMETHAMINE 30 MG/ML
30 INJECTION, SOLUTION INTRAMUSCULAR; INTRAVENOUS
Status: COMPLETED | OUTPATIENT
Start: 2019-05-21 | End: 2019-05-21

## 2019-05-21 RX ORDER — CODEINE PHOSPHATE AND GUAIFENESIN 10; 100 MG/5ML; MG/5ML
5 SOLUTION ORAL 3 TIMES DAILY PRN
Qty: 118 ML | Refills: 0 | Status: SHIPPED | OUTPATIENT
Start: 2019-05-21 | End: 2019-05-31

## 2019-05-21 RX ADMIN — KETOROLAC TROMETHAMINE 30 MG: 30 INJECTION, SOLUTION INTRAMUSCULAR; INTRAVENOUS at 08:05
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
## 2019-05-22 NOTE — PATIENT INSTRUCTIONS
General Discharge Instructions   If you were prescribed a narcotic or controlled medication, do not drive or operate heavy equipment or machinery while taking these medications.  If you were prescribed antibiotics, please take them to completion.  You must understand that you've received an Urgent Care treatment only and that you may be released before all your medical problems are known or treated. You, the patient, will arrange for follow up care as instructed.  Follow up with your PCP or specialty clinic as directed in the next 1-2 weeks if not improved or as needed.  You can call [phone] to schedule an appointment with the appropriate provider.  If your condition worsens we recommend that you receive another evaluation at the emergency room immediately or contact your primary medical clinics after hours call service to discuss your concerns.  Please return here or go to the Emergency Department for any concerns or worsening of condition.      Migraine Headache  This often severe type of headache is different from other types of headaches in that symptoms other than pain occur with the headache. Nausea and vomiting, lightheadedness, sensitivity to light (photophobia), and other visual disturbances are common migraine symptoms. The pain may last from a few hours to several days. It is not clear why migraines occur but certain factors called triggers can raise the risk of having a migraine attack. A migraine may be triggered by emotional stress or depression, or by hormone changes during the menstrual cycle. Other triggers include birth control pills, overuse of migraine medicines, alcohol or caffeine, foods with tyramine (such as aged cheese and wine), eyestrain, weather changes, missed meals, or too little or too much sleep.  Home care  Follow these tips when taking care of yourself at home:  · Dont drive yourself home if you were given pain medicine for your headache or are having visual symptoms.  Instead, have someone else drive you home. Try to sleep when you get home. You should feel much better when you wake up.  · Cold can help ease migraine symptoms. Put an ice pack on your forehead or at the base of your skull. Put heat on the back of your neck to help ease any neck spasm.  · Drink only clear liquids or eat a light diet until your symptoms get better. This will help you avoid nausea and vomiting.  How to prevent migraines  Pay attention to what seems to trigger your headache. Try to avoid the triggers when you can. If you have frequent headaches, consider keeping a headache diary. In it, write down what you were doing, feeling, or eating in the hours before each headache. Show this to your healthcare provider to help find the cause of your headaches.  If stress seems to be a trigger for your headaches, figure out what is causing stress in your life. Learn new ways to handle your stress. Ideas include regular exercise, biofeedback, self-hypnosis, yoga, and meditation. Talk with your healthcare provider to find out more information about managing stress. Many books and digital media are also available on this subject.  Tyramine is a substance found in many foods. It can trigger a migraine in some people. These foods contain tyramine:  · Chocolate  · Yogurt  · All cheeses, but especially aged cheeses  · Smoked or pickled fish and meat, including herring, caviar, bologna, pepperoni, and salami  · Liver  · Avocados  · Bananas  · Figs  · Raisins  · Red wine  Try staying away from these foods for 1 to 2 months to see if you have fewer headaches.  How to treat future headaches  · Take time out at the first sign of a headache, if possible. Find a quiet, dark, comfortable place to sit or lie down. Let yourself relax or sleep.  · Put an ice pack on your forehead or on the area of greatest pain. A heating pad and massage may help if you are having a muscle spasm and tightness in your neck.  · If you have been  prescribed a medicine to stop a migraine headache, use this at the first warning sign of the headache for best results. First signs may be an aura or pain.  · If you need to take medicine often for your migraine, talk with your healthcare provider about other ways to prevent your headaches.  Follow-up care  Follow up with your healthcare provider, or as advised. Talk with your provider if you have frequent headaches. He or she can figure out a treatment plan. Ask if you can have medicine to take at home the next time you get a bad headache. This may keep you from having to visit the emergency department in the future. You may need to see a headache specialist (neurologist) if you continue to have headaches.  When to seek medical advice  Call your healthcare provider right away if any of these occur:  · Your head pain gets worse, or doesnt get better within 24 hours  · You cant keep liquids down (repeated vomiting)  · Pain in your sinuses, ears, or throat  · Fever of 100.4º F (38º C) or higher, or as directed by your healthcare provider  · Stiff neck  · Extreme drowsiness, confusion, or fainting  · Dizziness, or dizziness with spinning sensation (vertigo)  · Weakness in an arm or leg, or on one side of your face  · Difficulty talking or seeing  Date Last Reviewed: 8/1/2016  © 6289-1797 [employer]. [address] [place] [address]. All rights reserved. This information is not intended as a substitute for professional medical care. Always follow your healthcare professional's instructions.      Sciatica    Sciatica is a condition that causes pain in the lower back that spreads down into the buttock, hip, and leg. Sometimes the leg pain can happen without any back pain. Sciatica happens when a spinal nerve is irritated or has pressure put on it as comes out of the spinal canal in the lower back. This most often happens when a bulge or rupture of a nearby spinal disk presses on the nerve.  Sciatica can also be caused by a narrowing of the spinal canal (spinal stenosis) or spasm of the muscle in the buttocks that the sciatic nerve passes through (pyriform muscle). Sciatica is also called lumbar radiculopathy.  Sciatica may begin after a sudden twisting or bending force, such as in a car accident. Or it can happen after a simple awkward movement. In either case, muscle spasm often also happens. Muscle spasm makes the pain worse.  A healthcare provider makes a diagnosis of sciatica from your symptoms and a physical exam. Unless you had an injury from a car accident or fall, you usually wont have X-rays taken at this time. This is because the nerves and disks in your back cant be seen on an X-ray. If the provider sees signs of a compressed nerve, you will need to schedule an MRI scan as an outpatient. Signs of a compressed nerve include loss of strength in a leg.  Most sciatica gets better with medicine, exercise, and physical therapy. If your symptoms continue after at least 3 months of medical treatment, you may need surgery or injections to your lower back.  Home care  Follow these tips when caring for yourself at home:  · You may need to stay in bed the first few days. But as soon as possible, begin sitting up or walking. This will help you avoid problems that come from staying in bed for long periods.  · When in bed, try to find a position that is comfortable. A firm mattress is best. Try lying flat on your back with pillows under your knees. You can also try lying on your side with your knees bent up toward your chest and a pillow between your knees.  · Avoid sitting for long periods. This puts more stress on your lower back than standing or walking.  · Use heat from a hot shower, hot bath, or heating pad to help ease pain. Massage can also help. You can also try using an ice pack. You can make your own ice pack by putting ice cubes in a plastic bag. Wrap the bag in a thin towel. Try both heat and  cold to see which works best. Use the method that feels best for 20 minutes several times a day.  · You may use acetaminophen or ibuprofen to ease pain, unless another pain medicine was prescribed. Note: If you have chronic liver or kidney disease, talk with your healthcare provider before taking these medicines. Also talk with your provider if youve had a stomach ulcer or gastrointestinal bleeding.  · Use safe lifting methods. Dont lift anything heavier than 15 pounds until all of the pain is gone.  Follow-up care  Follow up with your healthcare provider, or as advised. You may need physical therapy or additional tests.  If X-rays were taken, a radiologist will look at them. You will be told of any new findings that may affect your care.  When to seek medical advice  Call your healthcare provider right away if any of these occur:  · Pain gets worse even after taking prescribed medicine  · Weakness or numbness in 1 or both legs or hips  · Numbness in your groin or genital area  · You cant control your bowel or bladder  · Fever  · Redness or swelling over your back or spine   Date Last Reviewed: 8/1/2016 © 2000-2017 [employer]. [address] [place] [address], [place] [address]. All rights reserved. This information is not intended as a substitute for professional medical care. Always follow your healthcare professional's instructions.

## 2019-05-23 ENCOUNTER — TELEPHONE (OUTPATIENT)
Dept: URGENT CARE | Facility: CLINIC | Age: 49
End: 2019-05-23

## 2019-05-24 NOTE — PROGRESS NOTES
"  [facility]      Name: Audrey Bronson huey  Clinic Number: [id number]    Date of Treatment: 2019     Diagnosis:   Encounter Diagnoses   Name Primary?    Weakness of lower extremity, unspecified laterality     Decreased ROM of lumbar spine      Physician: Donaldo Pena MD    Precautions: Fall     Pattern of pain determined: Pattern 1     Evaluation: 19  Authorization period: 2019  Plan of care Expiration: 2019  Reassessment Due: 19  Visit # / Visits authorized:  (new referral; total visits 15)    Time In: 11:09 am  Time Out: 12:00 pm  Total Billable Time: 45 minutes     Subjective     Audrey reports that  She is feeling ok. Pt states she has 4/10 lower back pain. Pt reports she went to E.R and had injection on lower back causing decreases pain.     Patient reports tolerating previous visit well.  No increase in symptoms.  Patient reports their pain to be 5/10 on a 0-10 scale with 0 being no pain and 10 being the worst pain imaginable.  Pain Location: low back and L hip     Occupation:  None    Leisure: tv, clean house                      Pts goals:  "I was still doing all the stretches since the last time I came here, so I just want to have decreased pain"     Objective     Baseline IM Testing Results:   Date of testin19  ROM 42 deg   Max Peak Torque 161    Min Peak Torque 49    Flex/Ext Ratio 3.29   % below normative data 39     Mid-point IM Testing Results:   Date of testin2019  ROM 0 to 42 deg   Max Peak Torque 118 ft.lbs   Min Peak Torque 78 ft.lbs   Flex/Ext Ratio 3.29   % below normative data 23%   Test Percent Gain in Strength from Initial 154%       Outcomes:  CMS Impairment/Limitation/Restriction for FOTO Lumbar Spine Survey  Status Limitation G-Code CMS Severity Modifier  Intake 37% 63%  Predicted 43% 57% Goal Status+ CK - At least 40 percent but less than 60 percent  3/6/2019 40% 60%  2019 48% 52% Current Status " "CK - At least 40 percent but less than 60 percent  D/C Status CK **only report if this is discharge survey  +Based on FOTO predicted change score     Treatment      Pt was instructed in and performed the following:     Audrey received therapeutic exercises to develop/improved posture, cardiovascular endurance, muscular endurance, lumbar/cervical ROM, strength and muscular endurance for 30 minutes including the following exercises:     TALIA on SB x 20  LTR on SB x 20  SL opening mob over BOSU 2 x 30" B    [facility] 5/27/2019   Visit Number 13   VAS Pain Rating 4   Treadmill Time (in min.) 10   Speed 1.8   Incline 0   Flexion in Lying -   Manual Therapy -   Lumbar Extension Seat Pad -   Femur Restraint -   Top Dead Center -   Counterweight -   Lumbar Flexion -   Lumbar Extension -   Lumbar Peak Torque -   Min Torque -   Test Percent Below Normative Data -   Test Percent Gain in Strength from Initial  -   Lumbar Weight 69   Repetitions 29   Rating of Perceived Exertion 3   Ice - Z Lie (in min.) 0       Peripheral muscle strengthening which included 1 set of 15-20 repetitions at a slow, controlled 7 second per rep pace focused on strengthening supporting musculature for improved body mechanics and functional mobility.  Pt and therapist focused on proper form during treatment to ensure optimal strengthening of each targeted muscle group.  Machines were utilized including torso rotation, leg extension, leg curl, chest press, upright row. Tricep extension, bicep curl, leg press, and hip abduction/adduction. NP due to increase in pain    Audrey received the following manual therapy techniques: 00 minutes     Home Exercise Program as follows:   Pt demo good understanding of the education provided. Audrey demonstrated good return demonstration of activities.   Lumbar roll use compliance: NA  Additional exercises taught this treatment session: none    Assessment     Pt tolerated therapy well today. Pt demonstrated " less lower back pain today due to injection in the lower back during E.R visit. Pt tolerated stretching techniques with no provocation lower back pain.  Pt tolerated Medx lumbar extension 69 ft.lbs, 20 reps and RPE of 3. Pt did not have an increase in lower back pain today Pt was avle to tolerated UE and LE peripheral muscle strengthening with no adverse effects. Pt might be d/c next visit. Plan to cont heathy back protocol.     Patient is making fair progress towards established goals.  Pt will continue to benefit from skilled outpatient physical therapy to address the deficits stated in the impairment chart, provide pt/family education and to maximize pt's level of independence in the home and community environment.     Pt's spiritual, cultural and educational needs considered and pt agreeable to plan of care and goals as stated below:     Medical necessity is demonstrated by the following problem list.    Pt presents with the following impairments:      GOALS: Pt is in agreement with the following goals.     Short term goals:  6 weeks or 10 visits  Updated 5/17/2019  1.  Pt will demonstrate increased lumbar ROM by at least 5 degrees from the initial ROM value with improvements noted in functional ROM and ability to perform ADLs.  Goal in progress  2.  Pt will demonstrate increased maximum isometric torque value by % when compared to the initial value resulting in improved ability to perform bending, lifting, and carrying activities safely, confidently. - Goal in progress  3.  Patient report a reduction in worst pain score by 3-4 points for improved tolerance during work and recreational activities. Goal in progress  4. Pt to improve BLE by 1/2 MMT to improve functional mobility and participation in ADLs. Goal to be assessed   5.  Pt able to perform HEP correctly with minimal cueing or supervision for therapist. Goal met     Long term goals: 13 weeks or 20 visits   1. Pt will demonstrate increased lumbar flexion  by at least 10 degrees from initial ROM value, resulting in improved ability to perform functional fwd bending while standing and sitting. Appropriate, ongoing  2. Pt will demonstrate increased maximum isometric torque value by % when compared to the initial value resulting in improved ability to perform bending, lifting, and carrying activities safely, confidently. Will assess next visit and make goal. Appropriate, ongoing  3. Pt to demonstrate ability to independently control and reduce their pain through posture positioning and mechanical movements throughout a typical day.Appropriate, ongoing  4. Pt to improve BLE by 1 MMT to improve functional mobility and participation in ADLs. Appropriate, ongoing  4.  Patient will demonstrate improved overall function per FOTO Survey to CK = at least 40% but < 60% impaired, limited or restricted score or less.Appropriate, ongoing    Plan     Continue with established Plan of Care towards established PT goals.   Progress with MedX resistance at next visit.     Mayur He, PT   05/27/2019

## 2019-05-27 ENCOUNTER — CLINICAL SUPPORT (OUTPATIENT)
Dept: REHABILITATION | Facility: HOSPITAL | Age: 49
End: 2019-05-27
Attending: INTERNAL MEDICINE
Payer: MEDICAID

## 2019-05-27 DIAGNOSIS — R29.898 WEAKNESS OF LOWER EXTREMITY, UNSPECIFIED LATERALITY: ICD-10-CM

## 2019-05-27 DIAGNOSIS — M53.86 DECREASED ROM OF LUMBAR SPINE: ICD-10-CM

## 2019-05-27 PROCEDURE — 97110 THERAPEUTIC EXERCISES: CPT | Mod: PN

## 2019-05-31 ENCOUNTER — LAB VISIT (OUTPATIENT)
Dept: LAB | Facility: HOSPITAL | Age: 49
End: 2019-05-31
Attending: INTERNAL MEDICINE
Payer: MEDICAID

## 2019-05-31 ENCOUNTER — OFFICE VISIT (OUTPATIENT)
Dept: FAMILY MEDICINE | Facility: CLINIC | Age: 49
End: 2019-05-31
Payer: MEDICAID

## 2019-05-31 VITALS
RESPIRATION RATE: 17 BRPM | WEIGHT: 217.13 LBS | SYSTOLIC BLOOD PRESSURE: 132 MMHG | OXYGEN SATURATION: 95 % | HEIGHT: 66 IN | TEMPERATURE: 98 F | HEART RATE: 72 BPM | DIASTOLIC BLOOD PRESSURE: 76 MMHG | BODY MASS INDEX: 34.9 KG/M2

## 2019-05-31 DIAGNOSIS — I10 ESSENTIAL HYPERTENSION: Chronic | ICD-10-CM

## 2019-05-31 DIAGNOSIS — E11.42 TYPE 2 DIABETES MELLITUS WITH DIABETIC POLYNEUROPATHY, WITHOUT LONG-TERM CURRENT USE OF INSULIN: ICD-10-CM

## 2019-05-31 DIAGNOSIS — M54.42 CHRONIC BILATERAL LOW BACK PAIN WITH LEFT-SIDED SCIATICA: Primary | ICD-10-CM

## 2019-05-31 DIAGNOSIS — B35.9 TINEA: ICD-10-CM

## 2019-05-31 DIAGNOSIS — J30.89 NON-SEASONAL ALLERGIC RHINITIS, UNSPECIFIED TRIGGER: ICD-10-CM

## 2019-05-31 DIAGNOSIS — G89.29 CHRONIC BILATERAL LOW BACK PAIN WITH LEFT-SIDED SCIATICA: Primary | ICD-10-CM

## 2019-05-31 LAB
ALBUMIN SERPL BCP-MCNC: 3.7 G/DL (ref 3.5–5.2)
ALP SERPL-CCNC: 86 U/L (ref 55–135)
ALT SERPL W/O P-5'-P-CCNC: 15 U/L (ref 10–44)
ANION GAP SERPL CALC-SCNC: 9 MMOL/L (ref 8–16)
AST SERPL-CCNC: 13 U/L (ref 10–40)
BASOPHILS # BLD AUTO: 0.07 K/UL (ref 0–0.2)
BASOPHILS NFR BLD: 0.7 % (ref 0–1.9)
BILIRUB SERPL-MCNC: 0.2 MG/DL (ref 0.1–1)
BUN SERPL-MCNC: 11 MG/DL (ref 6–20)
CALCIUM SERPL-MCNC: 9.1 MG/DL (ref 8.7–10.5)
CHLORIDE SERPL-SCNC: 102 MMOL/L (ref 95–110)
CO2 SERPL-SCNC: 30 MMOL/L (ref 23–29)
CREAT SERPL-MCNC: 0.7 MG/DL (ref 0.5–1.4)
DIFFERENTIAL METHOD: ABNORMAL
EOSINOPHIL # BLD AUTO: 0.4 K/UL (ref 0–0.5)
EOSINOPHIL NFR BLD: 4 % (ref 0–8)
ERYTHROCYTE [DISTWIDTH] IN BLOOD BY AUTOMATED COUNT: 15.6 % (ref 11.5–14.5)
EST. GFR  (AFRICAN AMERICAN): >60 ML/MIN/1.73 M^2
EST. GFR  (NON AFRICAN AMERICAN): >60 ML/MIN/1.73 M^2
GLUCOSE SERPL-MCNC: 107 MG/DL (ref 70–110)
HCT VFR BLD AUTO: 41.6 % (ref 37–48.5)
HGB BLD-MCNC: 13.6 G/DL (ref 12–16)
LYMPHOCYTES # BLD AUTO: 4.2 K/UL (ref 1–4.8)
LYMPHOCYTES NFR BLD: 41.1 % (ref 18–48)
MCH RBC QN AUTO: 29.8 PG (ref 27–31)
MCHC RBC AUTO-ENTMCNC: 32.7 G/DL (ref 32–36)
MCV RBC AUTO: 91 FL (ref 82–98)
MONOCYTES # BLD AUTO: 1.2 K/UL (ref 0.3–1)
MONOCYTES NFR BLD: 11.6 % (ref 4–15)
NEUTROPHILS # BLD AUTO: 4.4 K/UL (ref 1.8–7.7)
NEUTROPHILS NFR BLD: 42.6 % (ref 38–73)
PLATELET # BLD AUTO: 534 K/UL (ref 150–350)
PMV BLD AUTO: 10.7 FL (ref 9.2–12.9)
POTASSIUM SERPL-SCNC: 4.4 MMOL/L (ref 3.5–5.1)
PROT SERPL-MCNC: 6.5 G/DL (ref 6–8.4)
RBC # BLD AUTO: 4.57 M/UL (ref 4–5.4)
SODIUM SERPL-SCNC: 141 MMOL/L (ref 136–145)
WBC # BLD AUTO: 10.3 K/UL (ref 3.9–12.7)

## 2019-05-31 PROCEDURE — 80053 COMPREHEN METABOLIC PANEL: CPT

## 2019-05-31 PROCEDURE — 36415 COLL VENOUS BLD VENIPUNCTURE: CPT | Mod: PN

## 2019-05-31 PROCEDURE — 85025 COMPLETE CBC W/AUTO DIFF WBC: CPT

## 2019-05-31 PROCEDURE — 83036 HEMOGLOBIN GLYCOSYLATED A1C: CPT

## 2019-05-31 PROCEDURE — 99214 PR OFFICE/OUTPT VISIT, EST, LEVL IV, 30-39 MIN: ICD-10-PCS | Mod: S$PBB,,, | Performed by: INTERNAL MEDICINE

## 2019-05-31 PROCEDURE — 99999 PR PBB SHADOW E&M-EST. PATIENT-LVL V: ICD-10-PCS | Mod: PBBFAC,,, | Performed by: INTERNAL MEDICINE

## 2019-05-31 PROCEDURE — 99999 PR PBB SHADOW E&M-EST. PATIENT-LVL V: CPT | Mod: PBBFAC,,, | Performed by: INTERNAL MEDICINE

## 2019-05-31 PROCEDURE — 99214 OFFICE O/P EST MOD 30 MIN: CPT | Mod: S$PBB,,, | Performed by: INTERNAL MEDICINE

## 2019-05-31 PROCEDURE — 99215 OFFICE O/P EST HI 40 MIN: CPT | Mod: PBBFAC,PN | Performed by: INTERNAL MEDICINE

## 2019-05-31 RX ORDER — NYSTATIN 100000 [USP'U]/G
POWDER TOPICAL 2 TIMES DAILY
Qty: 60 G | Refills: 2 | Status: SHIPPED | OUTPATIENT
Start: 2019-05-31 | End: 2019-11-27 | Stop reason: SDUPTHER

## 2019-05-31 RX ORDER — FLUTICASONE PROPIONATE 50 MCG
1 SPRAY, SUSPENSION (ML) NASAL 2 TIMES DAILY
Qty: 16 G | Refills: 3 | Status: SHIPPED | OUTPATIENT
Start: 2019-05-31 | End: 2020-03-25
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
## 2019-06-01 LAB
ESTIMATED AVG GLUCOSE: 123 MG/DL (ref 68–131)
HBA1C MFR BLD HPLC: 5.9 % (ref 4–5.6)

## 2019-06-17 ENCOUNTER — TELEPHONE (OUTPATIENT)
Dept: FAMILY MEDICINE | Facility: CLINIC | Age: 49
End: 2019-06-17

## 2019-06-17 NOTE — TELEPHONE ENCOUNTER
----- Message from Toby Kirby sent at 6/17/2019 10:14 AM CDT -----  Contact: rep Guzmán  Informational: Audrey Natarajan is no longer in our diabetes CM program. No call back needed unless there are questions, ph. [phone] opt [phone] ext [phone] Guzmán

## 2019-06-21 ENCOUNTER — TELEPHONE (OUTPATIENT)
Dept: FAMILY MEDICINE | Facility: CLINIC | Age: 49
End: 2019-06-21

## 2019-06-21 NOTE — TELEPHONE ENCOUNTER
----- Message from Aleah Tavarez sent at 6/21/2019  9:09 AM CDT -----  Contact: Audrey [phone]  Type: RX Refill Request    Who Called: Audrey    Refill or New Rx:Refill and PA    RX Name and Strength:ciclopirox (LOPROX) 0.77 % Susp (#patient also reports that she needs a PA for the medication as well#)    Is this a 30 day or 90 day RX:30    Preferred Pharmacy with phone number:[facility] [id number] [place] [address] [place] - [phone] [facility] [address] [facility]    Would the patient rather a call back or a response via [facility]? Call back    Best Call Back Number:[phone]

## 2019-06-21 NOTE — TELEPHONE ENCOUNTER
I spoke to the Pt and advised her to follow up with Podiatry for refills. Pt verbalizes understanding.

## 2019-06-22 DIAGNOSIS — B35.1 ONYCHOMYCOSIS OF RIGHT GREAT TOE: ICD-10-CM

## 2019-06-22 DIAGNOSIS — B35.1 ONYCHOMYCOSIS DUE TO DERMATOPHYTE: Primary | ICD-10-CM

## 2019-06-22 RX ORDER — CICLOPIROX OLAMINE 7.7 MG/100ML
SUSPENSION TOPICAL DAILY
Qty: 30 ML | Refills: 3 | Status: ON HOLD | OUTPATIENT
Start: 2019-06-22 | End: 2021-02-20 | Stop reason: HOSPADM

## 2019-06-30 DIAGNOSIS — K21.9 GASTROESOPHAGEAL REFLUX DISEASE WITHOUT ESOPHAGITIS: ICD-10-CM

## 2019-07-01 ENCOUNTER — CLINICAL SUPPORT (OUTPATIENT)
Dept: REHABILITATION | Facility: HOSPITAL | Age: 49
End: 2019-07-01
Attending: INTERNAL MEDICINE
Payer: MEDICAID

## 2019-07-01 DIAGNOSIS — R68.89 DECREASED STRENGTH, ENDURANCE, AND MOBILITY: ICD-10-CM

## 2019-07-01 DIAGNOSIS — M25.60 DECREASED RANGE OF MOTION: ICD-10-CM

## 2019-07-01 DIAGNOSIS — G89.29 CHRONIC LEFT-SIDED LOW BACK PAIN WITH LEFT-SIDED SCIATICA: ICD-10-CM

## 2019-07-01 DIAGNOSIS — R53.1 DECREASED STRENGTH, ENDURANCE, AND MOBILITY: ICD-10-CM

## 2019-07-01 DIAGNOSIS — Z74.09 DECREASED STRENGTH, ENDURANCE, AND MOBILITY: ICD-10-CM

## 2019-07-01 DIAGNOSIS — M54.42 CHRONIC LEFT-SIDED LOW BACK PAIN WITH LEFT-SIDED SCIATICA: ICD-10-CM

## 2019-07-01 PROBLEM — R29.898 LOWER EXTREMITY WEAKNESS: Status: RESOLVED | Noted: 2019-02-01 | Resolved: 2019-07-01

## 2019-07-01 PROBLEM — M62.81 WEAKNESS OF TRUNK MUSCULATURE: Status: RESOLVED | Noted: 2018-08-07 | Resolved: 2019-07-01

## 2019-07-01 PROBLEM — M54.50 CHRONIC MIDLINE LOW BACK PAIN WITHOUT SCIATICA: Status: RESOLVED | Noted: 2018-08-07 | Resolved: 2019-07-01

## 2019-07-01 PROBLEM — M53.86 DECREASED RANGE OF MOTION OF INTERVERTEBRAL DISCS OF LUMBAR SPINE: Status: RESOLVED | Noted: 2018-08-07 | Resolved: 2019-07-01

## 2019-07-01 PROBLEM — M53.86 DECREASED ROM OF LUMBAR SPINE: Status: RESOLVED | Noted: 2019-02-01 | Resolved: 2019-07-01

## 2019-07-01 PROCEDURE — 97162 PT EVAL MOD COMPLEX 30 MIN: CPT | Mod: PN

## 2019-07-01 PROCEDURE — 97110 THERAPEUTIC EXERCISES: CPT | Mod: PN

## 2019-07-01 RX ORDER — PHENOL/SODIUM PHENOLATE
AEROSOL, SPRAY (ML) MUCOUS MEMBRANE
Qty: 60 EACH | Refills: 5 | Status: SHIPPED | OUTPATIENT
Start: 2019-07-01 | End: 2019-12-05 | Stop reason: CLARIF
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
## 2019-07-02 ENCOUNTER — OFFICE VISIT (OUTPATIENT)
Dept: URGENT CARE | Facility: CLINIC | Age: 49
End: 2019-07-02
Payer: MEDICAID

## 2019-07-02 VITALS
HEIGHT: 66 IN | TEMPERATURE: 99 F | BODY MASS INDEX: 33.11 KG/M2 | HEART RATE: 84 BPM | RESPIRATION RATE: 18 BRPM | DIASTOLIC BLOOD PRESSURE: 71 MMHG | WEIGHT: 206 LBS | SYSTOLIC BLOOD PRESSURE: 115 MMHG | OXYGEN SATURATION: 98 %

## 2019-07-02 DIAGNOSIS — M77.50 TENDONITIS OF FOOT: ICD-10-CM

## 2019-07-02 DIAGNOSIS — G89.29 CHRONIC LEFT-SIDED LOW BACK PAIN WITHOUT SCIATICA: ICD-10-CM

## 2019-07-02 DIAGNOSIS — M79.672 FOOT PAIN, LEFT: Primary | ICD-10-CM

## 2019-07-02 DIAGNOSIS — M54.50 CHRONIC LEFT-SIDED LOW BACK PAIN WITHOUT SCIATICA: ICD-10-CM

## 2019-07-02 PROCEDURE — 99214 OFFICE O/P EST MOD 30 MIN: CPT | Mod: S$GLB,,, | Performed by: PHYSICIAN ASSISTANT

## 2019-07-02 PROCEDURE — 99214 PR OFFICE/OUTPT VISIT, EST, LEVL IV, 30-39 MIN: ICD-10-PCS | Mod: S$GLB,,, | Performed by: PHYSICIAN ASSISTANT

## 2019-07-02 PROCEDURE — 73630 XR FOOT COMPLETE 3 VIEW LEFT: ICD-10-PCS | Mod: LT,S$GLB,, | Performed by: RADIOLOGY

## 2019-07-02 PROCEDURE — 73630 X-RAY EXAM OF FOOT: CPT | Mod: LT,S$GLB,, | Performed by: RADIOLOGY

## 2019-07-02 RX ORDER — IBUPROFEN 600 MG/1
TABLET ORAL
Qty: 60 TABLET | Refills: 2 | Status: SHIPPED | OUTPATIENT
Start: 2019-07-02 | End: 2019-10-16

## 2019-07-02 RX ORDER — KETOROLAC TROMETHAMINE 30 MG/ML
30 INJECTION, SOLUTION INTRAMUSCULAR; INTRAVENOUS
Status: COMPLETED | OUTPATIENT
Start: 2019-07-02 | End: 2019-07-02

## 2019-07-02 RX ADMIN — KETOROLAC TROMETHAMINE 30 MG: 30 INJECTION, SOLUTION INTRAMUSCULAR; INTRAVENOUS at 10:07

## 2019-07-02 NOTE — PATIENT INSTRUCTIONS
General Discharge Instructions   Rest, ice, repeat. (DO NOT APPLY ICE DIRECTLY TO THE SKIN.  DO NOT LEAVE ON AFFECTED BODY PART FOR MORE THAN 15 MINUTES AT AT TIME TO AVOID INJURY TO SOFT TISSUE) and elevate the affected area keeping the ace wrap on for compression.   Repeat X ray in 1 week if you still have pain.  Follow up with orthopedics if the symptoms are not resolving as you thing they should     You were given a Toradol shot today in clinic. Do not take any anti-inflammatories (ibuprofen, naproxen, meloxicam) for the next 8 hours.    You must understand that you've received an Urgent Care treatment only and that you may be released before all your medical problems are known or treated. You, the patient, will arrange for follow up care as instructed.  Follow up with your PCP or specialty clinic as directed in the next 1-2 weeks if not improved or as needed.  You can call [phone] to schedule an appointment with the appropriate provider.  If your condition worsens we recommend that you receive another evaluation at the emergency room immediately or contact your primary medical clinics after hours call service to discuss your concerns.  Please return here or go to the Emergency Department for any concerns or worsening of condition.      What Is Tendonitis of the Foot?  When you use a set of muscles too much, youre likely to strain the tendons (soft tissues) that connect those muscles to your bones. At first, pain or swelling may come and go quickly. But if you do too much too soon, your muscles may overtire again. The strain may cause a tendons outer covering to swell or small fibers in a tendon to pull apart. If you keep pushing your muscles, damage to the tendons adds up, and tendonitis develops. Over time, pain and swelling may limit your activities. But with your doctors help, tendonitis can be controlled. Both your symptoms and your risk of future problems including tendon rupture can be  reduced.       The back of your foot  The Achilles tendon connects the calf muscle to the heel bone. If tendonitis occurs here, you may feel pain when your foot touches down or when your heel lifts off the ground.   The front of your foot  The anterior tibial tendon helps control the front of your foot when it meets the ground. If this tendon is strained, you may feel pain when you go down stairs or walk or run on hills.     The inside of your foot  The posterior tibial tendon runs along the inside of the ankle and foot. If this tendon is strained, your foot may hurt when it moves forward to push off the ground. Or you may feel pain when your heel shifts from side to side.   The outside of your foot  The peroneal tendon wraps across the bottom of your foot, from the outside to the inside. Tendonitis here may cause pain when you stand or push off the ground and when walking on uneven surfaces.   Date Last Reviewed: 9/21/2015  © 3198-4625 [employer], [employer]. [address] [place] [address], [place] [address]. All rights reserved. This information is not intended as a substitute for professional medical care. Always follow your healthcare professional's instructions.

## 2019-07-02 NOTE — PROGRESS NOTES
"Subjective:       Patient ID: Audrey Natarajan is a 48 y.o. female.    Vitals:  height is 5' 6" (1.676 m) and weight is 93.4 kg (206 lb). Her temperature is 98.9 °F (37.2 °C). Her blood pressure is 115/71 and her pulse is 84. Her respiration is 18 and oxygen saturation is 98%.     Chief Complaint: Ankle Injury    Patient states that on Saturday, she was walking down a stairs and rolled her ankle when she stepped down. States that she went to PT yesterday and it was significantly bruised and swollen especially on the top of her foot.  States that it is painful to touch and walk on. She has a history of achilles tendonitis in that ankle and states that she was in a long boot for 6 months last year because of it.    Foot Pain   This is a new problem. Episode onset: 2 days. The problem occurs constantly. The problem has been gradually worsening. Associated symptoms include joint swelling. Pertinent negatives include no abdominal pain, fatigue, vertigo or weakness. The symptoms are aggravated by bending, exertion, standing and walking. She has tried ice, heat and NSAIDs for the symptoms. The treatment provided no relief.       Constitution: Negative for fatigue.   HENT: Negative for facial swelling and facial trauma.    Neck: Negative for neck stiffness.   Cardiovascular: Negative for chest trauma.   Eyes: Negative for eye trauma, double vision and blurred vision.   Gastrointestinal: Negative for abdominal trauma, abdominal pain and rectal bleeding.   Genitourinary: Negative for hematuria, missed menses, genital trauma and pelvic pain.   Musculoskeletal: Positive for pain, trauma, joint swelling and abnormal ROM of joint.   Skin: Negative for color change, wound, abrasion, laceration and bruising.   Neurological: Negative for dizziness, history of vertigo, light-headedness, coordination disturbances, altered mental status and loss of consciousness.   Hematologic/Lymphatic: Negative for history of bleeding disorder. "   Psychiatric/Behavioral: Negative for altered mental status.       Objective:      Physical Exam   Constitutional: She is oriented to person, place, and time. She appears well-developed and well-nourished. She is cooperative.  Non-toxic appearance. She does not appear ill. No distress.   HENT:   Head: Normocephalic and atraumatic.   Right Ear: Hearing, tympanic membrane, external ear and ear canal normal.   Left Ear: Hearing, tympanic membrane, external ear and ear canal normal.   Nose: Nose normal. No mucosal edema, rhinorrhea or nasal deformity. No epistaxis. Right sinus exhibits no maxillary sinus tenderness and no frontal sinus tenderness. Left sinus exhibits no maxillary sinus tenderness and no frontal sinus tenderness.   Mouth/Throat: Uvula is midline, oropharynx is clear and moist and mucous membranes are normal. No trismus in the jaw. Normal dentition. No uvula swelling. No posterior oropharyngeal erythema.   Eyes: Conjunctivae and lids are normal. Right eye exhibits no discharge. Left eye exhibits no discharge. No scleral icterus.   Sclera clear bilat   Neck: Trachea normal, normal range of motion, full passive range of motion without pain and phonation normal. Neck supple.   Cardiovascular: Normal rate, regular rhythm, normal heart sounds, intact distal pulses and normal pulses.   Pulmonary/Chest: Effort normal and breath sounds normal. No respiratory distress.   Abdominal: Soft. Normal appearance and bowel sounds are normal. She exhibits no distension, no pulsatile midline mass and no mass. There is no tenderness.   Musculoskeletal: She exhibits no edema or deformity.        Left ankle: She exhibits decreased range of motion. She exhibits no swelling and no ecchymosis. Tenderness. Lateral malleolus tenderness found.        Left foot: There is decreased range of motion, tenderness, bony tenderness and swelling.        Feet:    Neurological: She is alert and oriented to person, place, and time. She  exhibits normal muscle tone. Coordination normal.   Skin: Skin is warm, dry and intact. She is not diaphoretic. No pallor.   Psychiatric: She has a normal mood and affect. Her speech is normal and behavior is normal. Judgment and thought content normal. Cognition and memory are normal.   Nursing note and vitals reviewed.      X-ray Foot Complete 3 View Left    Result Date: 7/2/2019  EXAMINATION: XR FOOT COMPLETE 3 VIEW LEFT CLINICAL HISTORY: .  Pain in left foot TECHNIQUE: AP, lateral and oblique views of the left foot were performed. COMPARISON: N 06/26/2008 one FINDINGS: Mild DJD.  Healed fracture involving the 3rd proximal phalanx.  No acute fracture or dislocation.  No bone destruction identified.     See above Electronically signed by: Shaggy Turner MD Date:    07/02/2019 Time:    10:37    Assessment:       1. Foot pain, left    2. Tendonitis of foot        Plan:         Foot pain, left  -     X-Ray Foot Complete 3 view Left; Future; Expected date: 07/02/2019    Tendonitis of foot  -     ketorolac injection 30 mg  -     NON-PNEUMATIC WALKING BOOT FOR HOME USE      Patient Instructions     General Discharge Instructions   Rest, ice, repeat. (DO NOT APPLY ICE DIRECTLY TO THE SKIN.  DO NOT LEAVE ON AFFECTED BODY PART FOR MORE THAN 15 MINUTES AT AT TIME TO AVOID INJURY TO SOFT TISSUE) and elevate the affected area keeping the ace wrap on for compression.   Repeat X ray in 1 week if you still have pain.  Follow up with orthopedics if the symptoms are not resolving as you thing they should     You were given a Toradol shot today in clinic. Do not take any anti-inflammatories (ibuprofen, naproxen, meloxicam) for the next 8 hours.    You must understand that you've received an Urgent Care treatment only and that you may be released before all your medical problems are known or treated. You, the patient, will arrange for follow up care as instructed.  Follow up with your PCP or specialty clinic as directed in the next  1-2 weeks if not improved or as needed.  You can call [phone] to schedule an appointment with the appropriate provider.  If your condition worsens we recommend that you receive another evaluation at the emergency room immediately or contact your primary medical clinics after hours call service to discuss your concerns.  Please return here or go to the Emergency Department for any concerns or worsening of condition.      What Is Tendonitis of the Foot?  When you use a set of muscles too much, youre likely to strain the tendons (soft tissues) that connect those muscles to your bones. At first, pain or swelling may come and go quickly. But if you do too much too soon, your muscles may overtire again. The strain may cause a tendons outer covering to swell or small fibers in a tendon to pull apart. If you keep pushing your muscles, damage to the tendons adds up, and tendonitis develops. Over time, pain and swelling may limit your activities. But with your doctors help, tendonitis can be controlled. Both your symptoms and your risk of future problems including tendon rupture can be reduced.       The back of your foot  The Achilles tendon connects the calf muscle to the heel bone. If tendonitis occurs here, you may feel pain when your foot touches down or when your heel lifts off the ground.   The front of your foot  The anterior tibial tendon helps control the front of your foot when it meets the ground. If this tendon is strained, you may feel pain when you go down stairs or walk or run on hills.     The inside of your foot  The posterior tibial tendon runs along the inside of the ankle and foot. If this tendon is strained, your foot may hurt when it moves forward to push off the ground. Or you may feel pain when your heel shifts from side to side.   The outside of your foot  The peroneal tendon wraps across the bottom of your foot, from the outside to the inside. Tendonitis here may cause pain when you stand  or push off the ground and when walking on uneven surfaces.   Date Last Reviewed: 9/21/2015  © 8825-6151 [employer]. [address] [place] [address], [place] [address]. All rights reserved. This information is not intended as a substitute for professional medical care. Always follow your healthcare professional's instructions.

## 2019-07-09 ENCOUNTER — CLINICAL SUPPORT (OUTPATIENT)
Dept: REHABILITATION | Facility: HOSPITAL | Age: 49
End: 2019-07-09
Attending: INTERNAL MEDICINE
Payer: MEDICAID

## 2019-07-09 DIAGNOSIS — G89.29 CHRONIC MIDLINE LOW BACK PAIN WITHOUT SCIATICA: ICD-10-CM

## 2019-07-09 DIAGNOSIS — R53.1 DECREASED STRENGTH, ENDURANCE, AND MOBILITY: ICD-10-CM

## 2019-07-09 DIAGNOSIS — Z74.09 DECREASED STRENGTH, ENDURANCE, AND MOBILITY: ICD-10-CM

## 2019-07-09 DIAGNOSIS — G89.29 CHRONIC LEFT-SIDED LOW BACK PAIN WITH LEFT-SIDED SCIATICA: ICD-10-CM

## 2019-07-09 DIAGNOSIS — M54.50 CHRONIC MIDLINE LOW BACK PAIN WITHOUT SCIATICA: ICD-10-CM

## 2019-07-09 DIAGNOSIS — R68.89 DECREASED STRENGTH, ENDURANCE, AND MOBILITY: ICD-10-CM

## 2019-07-09 DIAGNOSIS — M25.60 DECREASED RANGE OF MOTION: ICD-10-CM

## 2019-07-09 DIAGNOSIS — M54.42 CHRONIC LEFT-SIDED LOW BACK PAIN WITH LEFT-SIDED SCIATICA: ICD-10-CM

## 2019-07-09 PROCEDURE — 97110 THERAPEUTIC EXERCISES: CPT | Mod: PN

## 2019-07-09 RX ORDER — METHOCARBAMOL 500 MG/1
TABLET, FILM COATED ORAL
Qty: 100 TABLET | Refills: 1 | Status: SHIPPED | OUTPATIENT
Start: 2019-07-09 | End: 2019-09-11 | Stop reason: SDUPTHER

## 2019-07-09 NOTE — PROGRESS NOTES
[facility]      Name: Audrey Natarajan  Clinic Number: [id number]    Therapy Diagnosis:   Encounter Diagnoses   Name Primary?    Chronic left-sided low back pain with left-sided sciatica     Decreased range of motion     Decreased strength, endurance, and mobility      Physician: Donaldo Pena MD    Visit Date: 2019     Physician Orders: PT Eval and Treat [facility]   Medical Diagnosis from Referral: M54.42,G89.29 (ICD-10-CM) - Chronic bilateral low back pain with left-sided sciatica  Evaluation Date: 2019  Authorization Period Expiration: 19  Plan of Care Expiration: 10/1/19  Reassessment Due: 19  Visit # / Visits authorized:     Time In: 1407  Time Out: 1504  Total Billable Time: 25 minutes    Precautions: Standard and Diabetes    Pattern of pain determined: 1 PEN    Subjective   Audrey reports improvement of symptoms with performing HEP.      Patient reports tolerating previous visit good  Patient reports their pain to be 3/10 on a 0-10 scale with 0 being no pain and 10 being the worst pain imaginable.  Pain Location: low back      Occupation: None  Leisure: tv, clean house    Pt goals: decrease pain with activity    Objective     Baseline IM Testing Results:   Date of testin19  ROM 0-42 deg   Max Peak Torque 135    Min Peak Torque 54    Flex/Ext Ratio 2.5:1   % below normative data -21%     Outcomes:  Initial score: 55% limitation FOTO 19  Visit 5 score:  Goal: 45% limitation FOTO       Treatment    Pt was instructed in and performed the following:     Audrey received therapeutic exercises to develop/improved posture, cardiovascular endurance, muscular endurance, lumbar/cervical ROM, strength and muscular endurance for 47 minutes including the following exercises:   ROBERT x1'  Prone press ups x10   LTR x2'  DKTC x2'    [facility] 2019   Visit Number 2   VAS Pain Rating 3   Treadmill Time (in min.) 7   Speed 1.2    Incline 0   Lumbar Flexion 42   Lumbar Extension 0   Lumbar Weight 55   Repetitions 20   Rating of Perceived Exertion 4   Heat - Z Lie (in min.) 10       Peripheral muscle strengthening which included 1 set of 15-20 repetitions at a slow, controlled 10-13 second per rep pace focused on strengthening supporting musculature for improved body mechanics and functional mobility.  Pt and therapist focused on proper form during treatment to ensure optimal strengthening of each targeted muscle group.  Machines were utilized including torso rotation, leg extension, leg curl, chest press, upright row. Tricep extension, bicep curl, leg press, and hip abduction added visit 3.     Home Exercises Provided and Patient Education Provided     Education provided:   - reasoning behind extension-based exercises    Written Home Exercises Provided: Patient instructed to cont prior HEP.  Exercises were reviewed and Audrey was able to demonstrate them prior to the end of the session.  Audrey demonstrated good  understanding of the education provided.     See EMR under Patient Instructions for exercises provided prior visit.          Assessment     Pt presents to clinic ambulating in walking boot. She reports being compliant with HEP with decreased low back pain and discomfort. Added prone press ups today to improve lumbar extension mobility. Tolerated MedX machine well as she was able to perform 55# resistance for 20 reps at 4/10 on RPE scale. Added hip abd/add machines and held other lower extremity strengthening secondary to walking boot.     Patient is making fair progress towards established goals.  Pt will continue to benefit from skilled outpatient physical therapy to address the deficits stated in the impairment chart, provide pt/family education and to maximize pt's level of independence in the home and community environment.     Anticipated Barriers for therapy: compliance   Pt's spiritual, cultural and educational needs  considered and pt agreeable to plan of care and goals as stated below:         Goals:   Short term goals:  5 weeks or 10 visits   1.  Pt will demonstrate increased lumbar ROM by at least 3 degrees from the initial ROM value with improvements noted in functional ROM and ability to perform ADLs. - progressing  2.  Pt will demonstrate increased maximum isometric torque value by 25% when compared to the initial value resulting in improved ability to perform bending, lifting, and carrying activities safely, confidently. - progressing     3.  Patient report a reduction in worst pain score by 1-2 points for improved tolerance for walking and household chores. - progressing  4.  Pt able to perform HEP correctly with minimal cueing or supervision from therapist to encourage independent management of symptoms. - progressing        Long term goals: 10 weeks or 20 visits   1. Pt will demonstrate increased lumbar ROM by at least 6 degrees from initial ROM value, resulting in improved ability to perform functional fwd bending while standing and sitting.   2. Pt will demonstrate increased maximum isometric torque value by 50% when compared to the initial value resulting in improved ability to perform bending, lifting, and carrying activities safely, confidently.  3. Pt to demonstrate ability to independently control and reduce their pain through posture positioning and mechanical movements throughout a typical day.  4.  Pt will demonstrate reduced pain and improved functional outcomes as reported on the FOTO by reaching a score of CJ = at least 20% but < 40% impaired, limited or restricted or less in order to demonstrate subjective improvement in pt's condition.    5. Pt will demonstrate independence with the HEP at discharge  6.  Pt will be able to perform daily household chores with little to no low back pain.         Plan   Plan of Care Certification period: 7/1/19-10/1/19    Continue with 2x/wk. Continue with established Plan  of Care towards established PT goals. Progress per [facility] protocol as tolerated.      Herson Giron, PT, DPT  7/9/2019

## 2019-07-16 ENCOUNTER — CLINICAL SUPPORT (OUTPATIENT)
Dept: SMOKING CESSATION | Facility: CLINIC | Age: 49
End: 2019-07-16
Payer: COMMERCIAL

## 2019-07-16 DIAGNOSIS — F17.200 NICOTINE DEPENDENCE: Primary | ICD-10-CM

## 2019-07-16 PROCEDURE — 99407 PR TOBACCO USE CESSATION INTENSIVE >10 MINUTES: ICD-10-PCS | Mod: S$GLB,,, | Performed by: INTERNAL MEDICINE

## 2019-07-16 PROCEDURE — 99407 BEHAV CHNG SMOKING > 10 MIN: CPT | Mod: S$GLB,,, | Performed by: INTERNAL MEDICINE

## 2019-07-16 NOTE — PROGRESS NOTES
Spoke with patient today in regard to smoking cessation progress for 1 year telephone follow up, she states not tobacco free. Patient has scheduled an appointment to return to the program for Quit attempt # 2 on 7/25/2019 @ 12:30 pm. Informed patient of benefit period, future follow ups, and contact information if any further help or support is needed. Will resolve episode and complete smart form for Quit attempt #1.

## 2019-07-18 ENCOUNTER — CLINICAL SUPPORT (OUTPATIENT)
Dept: REHABILITATION | Facility: HOSPITAL | Age: 49
End: 2019-07-18
Attending: INTERNAL MEDICINE
Payer: MEDICAID

## 2019-07-18 DIAGNOSIS — R68.89 DECREASED STRENGTH, ENDURANCE, AND MOBILITY: ICD-10-CM

## 2019-07-18 DIAGNOSIS — R53.1 DECREASED STRENGTH, ENDURANCE, AND MOBILITY: ICD-10-CM

## 2019-07-18 DIAGNOSIS — G89.29 CHRONIC LEFT-SIDED LOW BACK PAIN WITH LEFT-SIDED SCIATICA: ICD-10-CM

## 2019-07-18 DIAGNOSIS — Z74.09 DECREASED STRENGTH, ENDURANCE, AND MOBILITY: ICD-10-CM

## 2019-07-18 DIAGNOSIS — M25.60 DECREASED RANGE OF MOTION: ICD-10-CM

## 2019-07-18 DIAGNOSIS — M54.42 CHRONIC LEFT-SIDED LOW BACK PAIN WITH LEFT-SIDED SCIATICA: ICD-10-CM

## 2019-07-18 PROCEDURE — 97110 THERAPEUTIC EXERCISES: CPT | Mod: PN

## 2019-07-18 NOTE — PROGRESS NOTES
[facility]      Name: Audrey Natarajan  Clinic Number: [id number]    Therapy Diagnosis:   Encounter Diagnoses   Name Primary?    Chronic left-sided low back pain with left-sided sciatica     Decreased range of motion     Decreased strength, endurance, and mobility      Physician: Donaldo Pena MD    Visit Date: 2019     Physician Orders: PT Eval and Treat [facility]   Medical Diagnosis from Referral: M54.42,G89.29 (ICD-10-CM) - Chronic bilateral low back pain with left-sided sciatica  Evaluation Date: 2019  Authorization Period Expiration: 19  Plan of Care Expiration: 10/1/19  Reassessment Due: 19  Visit # / Visits authorized: 3 / 12    Time In: 1200  Time Out: 1300  Total Billable Time: 60 minutes    Precautions: Standard and Diabetes    Pattern of pain determined: 1 PEN    Subjective     Audrey reports improvement of symptoms with performing HEP.      Patient reports tolerating previous visit good  Patient reports their pain to be 3/10 on a 0-10 scale with 0 being no pain and 10 being the worst pain imaginable.  Pain Location: low back      Occupation: None  Leisure: tv, clean house    Pt goals: decrease pain with activity    Objective     Baseline IM Testing Results:   Date of testin19  ROM 0-42 deg   Max Peak Torque 135    Min Peak Torque 54    Flex/Ext Ratio 2.5:1   % below normative data -21%     Outcomes:  Initial score: 55% limitation FOTO 19  Visit 5 score:  Goal: 45% limitation FOTO       Treatment    Pt was instructed in and performed the following:     Audrey received therapeutic exercises to develop/improved posture, cardiovascular endurance, muscular endurance, lumbar/cervical ROM, strength and muscular endurance for 47 minutes including the following exercises:     ROBERT x1'  Prone press ups x10   LTR x2'  DKTC x2'    [facility] 2019   Visit Number 3   VAS Pain Rating 6   Treadmill Time (in min.) 10    Speed 1.2   Incline 0   Flexion in Lying 10   Manual Therapy 0   Lumbar Weight 55   Repetitions 20   Rating of Perceived Exertion 4   Ice - Z Lie (in min.) 0     Peripheral muscle strengthening which included 1 set of 15-20 repetitions at a slow, controlled 10-13 second per rep pace focused on strengthening supporting musculature for improved body mechanics and functional mobility.  Pt and therapist focused on proper form during treatment to ensure optimal strengthening of each targeted muscle group.  Machines were utilized including torso rotation, leg extension, leg curl, chest press, upright row. Tricep extension, bicep curl, leg press, and hip abduction added visit 3.     Home Exercises Provided and Patient Education Provided     Education provided:   - reasoning behind extension-based exercises    Written Home Exercises Provided: Patient instructed to cont prior HEP.  Exercises were reviewed and Audrey was able to demonstrate them prior to the end of the session.  Audrey demonstrated good  understanding of the education provided.     See EMR under Patient Instructions for exercises provided prior visit.    Assessment     Patient tolerated treatment session well today. Good tolerance to MedX Lumbar Extension machine noting appropriate muscle response. Peripheral machines tolerated well. Continue progressing patient per [facility] protocol.    Patient is making fair progress towards established goals.  Pt will continue to benefit from skilled outpatient physical therapy to address the deficits stated in the impairment chart, provide pt/family education and to maximize pt's level of independence in the home and community environment.     Anticipated Barriers for therapy: compliance   Pt's spiritual, cultural and educational needs considered and pt agreeable to plan of care and goals as stated below:     Goals:   Short term goals:  5 weeks or 10 visits   1.  Pt will demonstrate increased lumbar ROM by at least  3 degrees from the initial ROM value with improvements noted in functional ROM and ability to perform ADLs. - progressing  2.  Pt will demonstrate increased maximum isometric torque value by 25% when compared to the initial value resulting in improved ability to perform bending, lifting, and carrying activities safely, confidently. - progressing  3.  Patient report a reduction in worst pain score by 1-2 points for improved tolerance for walking and household chores. - progressing  4.  Pt able to perform HEP correctly with minimal cueing or supervision from therapist to encourage independent management of symptoms. - progressing     Long term goals: 10 weeks or 20 visits   1. Pt will demonstrate increased lumbar ROM by at least 6 degrees from initial ROM value, resulting in improved ability to perform functional fwd bending while standing and sitting.   2. Pt will demonstrate increased maximum isometric torque value by 50% when compared to the initial value resulting in improved ability to perform bending, lifting, and carrying activities safely, confidently.  3. Pt to demonstrate ability to independently control and reduce their pain through posture positioning and mechanical movements throughout a typical day.  4.  Pt will demonstrate reduced pain and improved functional outcomes as reported on the FOTO by reaching a score of CJ = at least 20% but < 40% impaired, limited or restricted or less in order to demonstrate subjective improvement in pt's condition.    5. Pt will demonstrate independence with the HEP at discharge  6.  Pt will be able to perform daily household chores with little to no low back pain.     Plan     Plan of Care Certification period: 7/1/19-10/1/19    Continue with 2x/wk. Continue with established Plan of Care towards established PT goals. Progress per [facility] protocol as tolerated.      eKlli Prakash, PTA  7/18/2019

## 2019-07-24 ENCOUNTER — CLINICAL SUPPORT (OUTPATIENT)
Dept: REHABILITATION | Facility: HOSPITAL | Age: 49
End: 2019-07-24
Attending: INTERNAL MEDICINE
Payer: MEDICAID

## 2019-07-24 DIAGNOSIS — G89.29 CHRONIC LEFT-SIDED LOW BACK PAIN WITH LEFT-SIDED SCIATICA: ICD-10-CM

## 2019-07-24 DIAGNOSIS — R68.89 DECREASED STRENGTH, ENDURANCE, AND MOBILITY: ICD-10-CM

## 2019-07-24 DIAGNOSIS — R53.1 DECREASED STRENGTH, ENDURANCE, AND MOBILITY: ICD-10-CM

## 2019-07-24 DIAGNOSIS — M25.60 DECREASED RANGE OF MOTION: ICD-10-CM

## 2019-07-24 DIAGNOSIS — M54.42 CHRONIC LEFT-SIDED LOW BACK PAIN WITH LEFT-SIDED SCIATICA: ICD-10-CM

## 2019-07-24 DIAGNOSIS — Z74.09 DECREASED STRENGTH, ENDURANCE, AND MOBILITY: ICD-10-CM

## 2019-07-24 PROCEDURE — 97110 THERAPEUTIC EXERCISES: CPT | Mod: PN

## 2019-07-24 NOTE — PROGRESS NOTES
[facility]      Name: Audrey Natarajan  Clinic Number: [id number]    Therapy Diagnosis:   Encounter Diagnoses   Name Primary?    Chronic left-sided low back pain with left-sided sciatica     Decreased range of motion     Decreased strength, endurance, and mobility      Physician: Donaldo Pena MD    Visit Date: 2019     Physician Orders: PT Eval and Treat [facility]   Medical Diagnosis from Referral: M54.42,G89.29 (ICD-10-CM) - Chronic bilateral low back pain with left-sided sciatica  Evaluation Date: 2019  Authorization Period Expiration: 19  Plan of Care Expiration: 10/1/19  Reassessment Due: 19  Visit # / Visits authorized:     Time In: 2:36 pm  Time Out: 3:33 pm  Total Billable Time: 53 minutes    Precautions: Standard and Diabetes    Pattern of pain determined: 1 PEN    Subjective     Audrey reports  That she had increased in pain at left lower back. Pt states pain is about 5-6 out 10 today.    Patient reports tolerating previous visit good  Patient reports their pain to be 6/10 on a 0-10 scale with 0 being no pain and 10 being the worst pain imaginable.  Pain Location: low back      Occupation: None  Leisure: tv, clean house    Pt goals: decrease pain with activity    Objective     Baseline IM Testing Results:   Date of testin19  ROM 0-42 deg   Max Peak Torque 135    Min Peak Torque 54    Flex/Ext Ratio 2.5:1   % below normative data -21%     Outcomes:  Initial score: 55% limitation FOTO 19  Visit 5 score:  Goal: 45% limitation FOTO       Treatment    Pt was instructed in and performed the following:     Audrey received therapeutic exercises to develop/improved posture, cardiovascular endurance, muscular endurance, lumbar/cervical ROM, strength and muscular endurance for 53 minutes including the following exercises:     ROBERT x2  Prone press ups x10    LTR x2'  DKTC x2'  Open book 2x10   Bridges 3x10     [facility]  Therapy 7/24/2019   Visit Number 4   VAS Pain Rating 5   Treadmill Time (in min.) 10   Speed 1.2   Incline 0   Flexion in Lying -   Manual Therapy -   Lumbar Extension Seat Pad -   Femur Restraint -   Top Dead Center -   Counterweight -   Lumbar Flexion 45   Lumbar Extension 0   Lumbar Peak Torque -   Min Torque -   Test Percent Below Normative Data -   Test Percent Gain in Strength from Initial  -   Lumbar Weight 55   Repetitions 20   Rating of Perceived Exertion 3   Ice - Z Lie (in min.) 0       Peripheral muscle strengthening which included 1 set of 15-20 repetitions at a slow, controlled 10-13 second per rep pace focused on strengthening supporting musculature for improved body mechanics and functional mobility.  Pt and therapist focused on proper form during treatment to ensure optimal strengthening of each targeted muscle group.  Machines were utilized including torso rotation, leg extension, leg curl, chest press, upright row. Tricep extension, bicep curl, leg press, and hip abduction. Shuttle map was performed instead of leg press.     Home Exercises Provided and Patient Education Provided     Education provided:   - reasoning behind extension-based exercises    Written Home Exercises Provided: Patient instructed to cont prior HEP.  Exercises were reviewed and Audrey was able to demonstrate them prior to the end of the session.  Audrey demonstrated good  understanding of the education provided.     See EMR under Patient Instructions for exercises provided prior visit.    Assessment     Patient tolerated treatment session well today. Pt tolerated well progression of exercises today. V/c's required to perform open book and bridges exercises with proper form. Pt was also able to tolerated increase of 3 deg in range of motion  Medx lumbar extension machine. Pt tolerated 55 ft.lbs, 20 reps with RPE of 3 today. Medx lumbar range of motion was from 0 to 45 deg today. Pt tolerated peripheral muscle strength well. Pt  performed shuttle map instead of leg press due to cam boot..Continue progressing patient per [facility] protocol.    Patient is making fair progress towards established goals.  Pt will continue to benefit from skilled outpatient physical therapy to address the deficits stated in the impairment chart, provide pt/family education and to maximize pt's level of independence in the home and community environment.     Anticipated Barriers for therapy: compliance   Pt's spiritual, cultural and educational needs considered and pt agreeable to plan of care and goals as stated below:     Goals:   Short term goals:  5 weeks or 10 visits   1.  Pt will demonstrate increased lumbar ROM by at least 3 degrees from the initial ROM value with improvements noted in functional ROM and ability to perform ADLs. - progressing  2.  Pt will demonstrate increased maximum isometric torque value by 25% when compared to the initial value resulting in improved ability to perform bending, lifting, and carrying activities safely, confidently. - progressing  3.  Patient report a reduction in worst pain score by 1-2 points for improved tolerance for walking and household chores. - progressing  4.  Pt able to perform HEP correctly with minimal cueing or supervision from therapist to encourage independent management of symptoms. - progressing     Long term goals: 10 weeks or 20 visits   1. Pt will demonstrate increased lumbar ROM by at least 6 degrees from initial ROM value, resulting in improved ability to perform functional fwd bending while standing and sitting.   2. Pt will demonstrate increased maximum isometric torque value by 50% when compared to the initial value resulting in improved ability to perform bending, lifting, and carrying activities safely, confidently.  3. Pt to demonstrate ability to independently control and reduce their pain through posture positioning and mechanical movements throughout a typical day.  4.  Pt will  demonstrate reduced pain and improved functional outcomes as reported on the FOTO by reaching a score of CJ = at least 20% but < 40% impaired, limited or restricted or less in order to demonstrate subjective improvement in pt's condition.    5. Pt will demonstrate independence with the HEP at discharge  6.  Pt will be able to perform daily household chores with little to no low back pain.     Plan     Plan of Care Certification period: 7/1/19-10/1/19    Continue with 2x/wk. Continue with established Plan of Care towards established PT goals. Progress per [facility] protocol as tolerated.      Mayur He, PT  7/24/2019

## 2019-07-27 DIAGNOSIS — M54.50 CHRONIC BILATERAL LOW BACK PAIN WITHOUT SCIATICA: ICD-10-CM

## 2019-07-27 DIAGNOSIS — G89.29 CHRONIC BILATERAL LOW BACK PAIN WITHOUT SCIATICA: ICD-10-CM

## 2019-07-29 RX ORDER — MELOXICAM 15 MG/1
TABLET ORAL
Qty: 30 TABLET | Refills: 0 | Status: SHIPPED | OUTPATIENT
Start: 2019-07-29 | End: 2019-09-02 | Stop reason: SDUPTHER

## 2019-08-12 ENCOUNTER — CLINICAL SUPPORT (OUTPATIENT)
Dept: REHABILITATION | Facility: HOSPITAL | Age: 49
End: 2019-08-12
Attending: INTERNAL MEDICINE
Payer: MEDICAID

## 2019-08-12 DIAGNOSIS — J44.9 CHRONIC OBSTRUCTIVE PULMONARY DISEASE, UNSPECIFIED COPD TYPE: Chronic | ICD-10-CM

## 2019-08-12 DIAGNOSIS — M54.42 CHRONIC LEFT-SIDED LOW BACK PAIN WITH LEFT-SIDED SCIATICA: ICD-10-CM

## 2019-08-12 DIAGNOSIS — G89.29 CHRONIC LEFT-SIDED LOW BACK PAIN WITH LEFT-SIDED SCIATICA: ICD-10-CM

## 2019-08-12 DIAGNOSIS — R53.1 DECREASED STRENGTH, ENDURANCE, AND MOBILITY: ICD-10-CM

## 2019-08-12 DIAGNOSIS — Z74.09 DECREASED STRENGTH, ENDURANCE, AND MOBILITY: ICD-10-CM

## 2019-08-12 DIAGNOSIS — M25.60 DECREASED RANGE OF MOTION: ICD-10-CM

## 2019-08-12 DIAGNOSIS — R68.89 DECREASED STRENGTH, ENDURANCE, AND MOBILITY: ICD-10-CM

## 2019-08-12 PROCEDURE — 97110 THERAPEUTIC EXERCISES: CPT | Mod: PN

## 2019-08-12 NOTE — PROGRESS NOTES
[facility]      Name: Audrey Natarajan  Clinic Number: [id number]    Therapy Diagnosis:   Encounter Diagnoses   Name Primary?    Chronic left-sided low back pain with left-sided sciatica     Decreased range of motion     Decreased strength, endurance, and mobility      Physician: Donaldo Pena MD    Visit Date: 2019     Physician Orders: [facility]   Medical Diagnosis from Referral: M54.42,G89.29 (ICD-10-CM) - Chronic bilateral low back pain with left-sided sciatica  Evaluation Date: 2019  Authorization Period Expiration: 19  Plan of Care Expiration: 10/1/19  Reassessment Due: 19  Visit # / Visits authorized:     Time In: 1310  Time Out: 1404  Total Billable Time: 54 minutes    Precautions: Standard and Diabetes    Pattern of pain determined: 1 PEN    Subjective     Audrey reports that she has missed appointments because she went to [place] to help her sister with her nephew from health issues. Her L side has been bothering her more than her R side.     Patient reports tolerating previous visit good  Patient reports their pain to be 5/10 on a 0-10 scale with 0 being no pain and 10 being the worst pain imaginable.  Pain Location: low back      Occupation: None  Leisure: tv, clean house    Pt goals: decrease pain with activity    Objective     Baseline IM Testing Results:   Date of testin19  ROM 0-42 deg   Max Peak Torque 135    Min Peak Torque 54    Flex/Ext Ratio 2.5:1   % below normative data -21%     Outcomes:  Initial score: 55% limitation FOTO 19  Visit 5 score: 47% limitation FOTO 19  Goal: 45% limitation FOTO           Treatment    Pt was instructed in and performed the following:     Audrey received therapeutic exercises to develop/improved posture, cardiovascular endurance, muscular endurance, lumbar/cervical ROM, strength and muscular endurance for 53 minutes including the following exercises:  "    LTR x2'  Open book 2x10   Bridges 3x10   EIS 10x10" holds    [facility] Therapy 8/12/2019   Visit Number 5   VAS Pain Rating 5   Treadmill Time (in min.) 10   Speed 1.2   Incline 0   Lumbar Flexion 45   Lumbar Extension 0   Lumbar Weight 58   Repetitions 20   Rating of Perceived Exertion 3   Ice - Z Lie (in min.) 0       Peripheral muscle strengthening which included 1 set of 15-20 repetitions at a slow, controlled 10-13 second per rep pace focused on strengthening supporting musculature for improved body mechanics and functional mobility.  Pt and therapist focused on proper form during treatment to ensure optimal strengthening of each targeted muscle group.  Machines were utilized including torso rotation, leg extension, leg curl, chest press, upright row. Tricep extension, bicep curl, leg press, and hip abduction. Shuttle MVP was performed instead of leg press.     Home Exercises Provided and Patient Education Provided     Education provided:   - reasoning behind extension-based exercises    Written Home Exercises Provided: Patient instructed to cont prior HEP.  Exercises were reviewed and Audrey was able to demonstrate them prior to the end of the session.  Audrey demonstrated good  understanding of the education provided.     See EMR under Patient Instructions for exercises provided prior visit.    Assessment   Pt arrives to appt after cancelling her last 3 appts after reporting she had to leave out of town to help family. Instructed her to improve HEP compliance when not able to come to physical therapy.    V/c's required to perform open book exercise with proper form.Pt tolerated increase to 58 ft.lbs, 20 reps with RPE of 3/10 today. Medx lumbar range of motion was from 0 to 45 deg today. Pt tolerated peripheral muscle strength well. Pt performed shuttle MVP instead of leg press. She even scored better on FOTO for perceived function and limitation by improving from 45% to 53%. Continue progressing patient " per [facility] protocol.      Patient is making fair progress towards established goals.  Pt will continue to benefit from skilled outpatient physical therapy to address the deficits stated in the impairment chart, provide pt/family education and to maximize pt's level of independence in the home and community environment.     Anticipated Barriers for therapy: compliance   Pt's spiritual, cultural and educational needs considered and pt agreeable to plan of care and goals as stated below:     Goals:   Short term goals:  5 weeks or 10 visits   1.  Pt will demonstrate increased lumbar ROM by at least 3 degrees from the initial ROM value with improvements noted in functional ROM and ability to perform ADLs. - progressing  2.  Pt will demonstrate increased maximum isometric torque value by 25% when compared to the initial value resulting in improved ability to perform bending, lifting, and carrying activities safely, confidently. - progressing  3.  Patient report a reduction in worst pain score by 1-2 points for improved tolerance for walking and household chores. - progressing  4.  Pt able to perform HEP correctly with minimal cueing or supervision from therapist to encourage independent management of symptoms. - progressing     Long term goals: 10 weeks or 20 visits   1. Pt will demonstrate increased lumbar ROM by at least 6 degrees from initial ROM value, resulting in improved ability to perform functional fwd bending while standing and sitting.   2. Pt will demonstrate increased maximum isometric torque value by 50% when compared to the initial value resulting in improved ability to perform bending, lifting, and carrying activities safely, confidently.  3. Pt to demonstrate ability to independently control and reduce their pain through posture positioning and mechanical movements throughout a typical day.  4.  Pt will demonstrate reduced pain and improved functional outcomes as reported on the FOTO by reaching a  score of CJ = at least 20% but < 40% impaired, limited or restricted or less in order to demonstrate subjective improvement in pt's condition.    5. Pt will demonstrate independence with the HEP at discharge  6.  Pt will be able to perform daily household chores with little to no low back pain.     Plan     Plan of Care Certification period: 7/1/19-10/1/19    Continue with 2x/wk. Continue with established Plan of Care towards established PT goals. Progress per [facility] protocol as tolerated.      Herson Giron, PT  8/12/2019

## 2019-08-13 RX ORDER — MOMETASONE FUROATE AND FORMOTEROL FUMARATE DIHYDRATE 100; 5 UG/1; UG/1
AEROSOL RESPIRATORY (INHALATION)
Qty: 13 G | Refills: 0 | Status: SHIPPED | OUTPATIENT
Start: 2019-08-13 | End: 2019-09-10 | Stop reason: SDUPTHER

## 2019-08-20 ENCOUNTER — OFFICE VISIT (OUTPATIENT)
Dept: FAMILY MEDICINE | Facility: CLINIC | Age: 49
End: 2019-08-20
Payer: MEDICAID

## 2019-08-20 VITALS
TEMPERATURE: 97 F | HEIGHT: 66 IN | BODY MASS INDEX: 36.07 KG/M2 | WEIGHT: 224.44 LBS | RESPIRATION RATE: 17 BRPM | SYSTOLIC BLOOD PRESSURE: 136 MMHG | HEART RATE: 92 BPM | DIASTOLIC BLOOD PRESSURE: 74 MMHG | OXYGEN SATURATION: 95 %

## 2019-08-20 DIAGNOSIS — E11.42 TYPE 2 DIABETES MELLITUS WITH DIABETIC POLYNEUROPATHY, WITHOUT LONG-TERM CURRENT USE OF INSULIN: ICD-10-CM

## 2019-08-20 DIAGNOSIS — B35.3 TINEA PEDIS OF LEFT FOOT: Primary | ICD-10-CM

## 2019-08-20 DIAGNOSIS — I10 ESSENTIAL HYPERTENSION: Chronic | ICD-10-CM

## 2019-08-20 DIAGNOSIS — I73.9 CLAUDICATION: ICD-10-CM

## 2019-08-20 PROCEDURE — 99214 PR OFFICE/OUTPT VISIT, EST, LEVL IV, 30-39 MIN: ICD-10-PCS | Mod: S$PBB,,, | Performed by: INTERNAL MEDICINE

## 2019-08-20 PROCEDURE — 99999 PR PBB SHADOW E&M-EST. PATIENT-LVL IV: ICD-10-PCS | Mod: PBBFAC,,, | Performed by: INTERNAL MEDICINE

## 2019-08-20 PROCEDURE — 99214 OFFICE O/P EST MOD 30 MIN: CPT | Mod: S$PBB,,, | Performed by: INTERNAL MEDICINE

## 2019-08-20 PROCEDURE — 99214 OFFICE O/P EST MOD 30 MIN: CPT | Mod: PBBFAC,PN | Performed by: INTERNAL MEDICINE

## 2019-08-20 PROCEDURE — 99999 PR PBB SHADOW E&M-EST. PATIENT-LVL IV: CPT | Mod: PBBFAC,,, | Performed by: INTERNAL MEDICINE

## 2019-08-20 RX ORDER — CLOTRIMAZOLE 1 %
CREAM (GRAM) TOPICAL 2 TIMES DAILY
Qty: 28 G | Refills: 1 | Status: SHIPPED | OUTPATIENT
Start: 2019-08-20 | End: 2020-04-21 | Stop reason: SDUPTHER

## 2019-08-20 NOTE — PROGRESS NOTES
"Subjective:       Patient ID: Audrey Natarajan is a 49 y.o. female.    Chief Complaint: Diarrhea; Establish Care; Follow-up; and Foot Swelling    Left foot pain x three months    HPI: 50 y/o w/ HTN DM morbid obesity (s/p gastric sleeve) COPD current every day smoker presents for follow up. She reports near daily dorsal foot pain even at rest. Has noted increase in posterior calf pain with ambulating no numbness no drainage. Has been using topical antifungal with some relief. She does note that at times all toes of left foot turn blue,. No color changes on right foot or hands. Notes swelling to both ankles at end of day. Relief with elevation    Review of Systems   Constitutional: Negative for activity change, appetite change, fatigue, fever and unexpected weight change.   HENT: Negative for ear pain, rhinorrhea and sore throat.    Eyes: Negative for discharge and visual disturbance.   Respiratory: Negative for chest tightness, shortness of breath and wheezing.    Cardiovascular: Negative for chest pain, palpitations and leg swelling.   Gastrointestinal: Negative for abdominal pain, constipation and diarrhea.   Endocrine: Negative for cold intolerance and heat intolerance.   Genitourinary: Negative for dysuria and hematuria.   Musculoskeletal: Positive for arthralgias. Negative for joint swelling and neck stiffness.   Skin: Negative for rash.   Neurological: Negative for dizziness, syncope, weakness and headaches.   Psychiatric/Behavioral: Negative for suicidal ideas.       Objective:     Vitals:    08/20/19 0911 08/20/19 0947   BP: (!) 143/79 136/74   BP Location: Right arm    Patient Position: Sitting    Pulse: 103 92   Resp: 17    Temp: 97 °F (36.1 °C)    TempSrc: Oral    SpO2: 95%    Weight: 101.8 kg (224 lb 6.9 oz)    Height: 5' 6" (1.676 m)           Physical Exam   Constitutional: She is oriented to person, place, and time. She appears well-developed and well-nourished.   HENT:   Head: Normocephalic and " atraumatic.   Eyes: Conjunctivae are normal. No scleral icterus.   Neck: Normal range of motion.   Cardiovascular: Normal rate and regular rhythm. Exam reveals no gallop and no friction rub.   No murmur heard.  DP on left palpable but weaker than right. Distal toes pink with < 2 sec capillary refill   Pulmonary/Chest: Effort normal and breath sounds normal. She has no wheezes. She has no rales.   Abdominal: Soft. Bowel sounds are normal. There is no tenderness. There is no rebound and no guarding.   Musculoskeletal: Normal range of motion. She exhibits no edema or tenderness.   Neurological: She is alert and oriented to person, place, and time. No cranial nerve deficit.   Skin: Skin is warm and dry.   Psychiatric: She has a normal mood and affect.       Assessment and Plan   1. Tinea pedis of left foot  Topical azole bid daily  - clotrimazole (LOTRIMIN) 1 % cream; Apply topically 2 (two) times daily.  Dispense: 28 g; Refill: 1    2. Claudication  Screen for PAD with JEYSON's given smoking and DM history   - CV Ankle Brachial Indices WO Stress W Toe Tracings; Future    3. Type 2 diabetes mellitus with diabetic polyneuropathy, without long-term current use of insulin  Repeat a1c in two more months continue metformin bid  - Hemoglobin A1c; Future    4. Essential hypertension  Just at goal continue acei  - CBC auto differential; Future  - Comprehensive metabolic panel; Future

## 2019-08-22 ENCOUNTER — CLINICAL SUPPORT (OUTPATIENT)
Dept: REHABILITATION | Facility: HOSPITAL | Age: 49
End: 2019-08-22
Attending: INTERNAL MEDICINE
Payer: MEDICAID

## 2019-08-22 DIAGNOSIS — Z74.09 DECREASED STRENGTH, ENDURANCE, AND MOBILITY: ICD-10-CM

## 2019-08-22 DIAGNOSIS — R68.89 DECREASED STRENGTH, ENDURANCE, AND MOBILITY: ICD-10-CM

## 2019-08-22 DIAGNOSIS — G89.29 CHRONIC LEFT-SIDED LOW BACK PAIN WITH LEFT-SIDED SCIATICA: ICD-10-CM

## 2019-08-22 DIAGNOSIS — R53.1 DECREASED STRENGTH, ENDURANCE, AND MOBILITY: ICD-10-CM

## 2019-08-22 DIAGNOSIS — M25.60 DECREASED RANGE OF MOTION: ICD-10-CM

## 2019-08-22 DIAGNOSIS — M54.42 CHRONIC LEFT-SIDED LOW BACK PAIN WITH LEFT-SIDED SCIATICA: ICD-10-CM

## 2019-08-22 PROCEDURE — 97110 THERAPEUTIC EXERCISES: CPT | Mod: PN

## 2019-08-22 NOTE — PROGRESS NOTES
[facility]      Name: Audrey Natarajan  Clinic Number: [id number]    Therapy Diagnosis:   Encounter Diagnoses   Name Primary?    Chronic left-sided low back pain with left-sided sciatica     Decreased range of motion     Decreased strength, endurance, and mobility      Physician: Donaldo Pena MD    Visit Date: 2019     Physician Orders: PT Eval and Treat [facility]   Medical Diagnosis from Referral: M54.42,G89.29 (ICD-10-CM) - Chronic bilateral low back pain with left-sided sciatica  Evaluation Date: 2019  Authorization Period Expiration: 19  Plan of Care Expiration: 10/1/19  Reassessment Due: 19  Visit # / Visits authorized:     Time In: 1246  Time Out: 1332  Total Billable Time: 10 minutes    Precautions: Standard and Diabetes    Pattern of pain determined: 1 PEN    Subjective     Audrey reports that she has missed appointments recently because of stomach virus. She has pain on the L side of her back around the bottom of her rib cage.     Patient reports tolerating previous visit good  Patient reports their pain to be 5/10 on a 0-10 scale with 0 being no pain and 10 being the worst pain imaginable.  Pain Location: low back      Occupation: None  Leisure: tv, clean house    Pt goals: decrease pain with activity    Objective     Baseline IM Testing Results:   Date of testin19  ROM 0-42 deg   Max Peak Torque 135    Min Peak Torque 54    Flex/Ext Ratio 2.5:1   % below normative data -21%     Outcomes:  Initial score: 55% limitation FOTO 19  Visit 5 score: 47% limitation FOTO 19  Goal: 45% limitation FOTO           Treatment    Pt was instructed in and performed the following:     Audrey received therapeutic exercises to develop/improved posture, cardiovascular endurance, muscular endurance, lumbar/cervical ROM, strength and muscular endurance for 53 minutes including the following exercises:     LTR x2'  Open book 2x10  "  Bridges 3x10   EIS 10x10" holds    [facility] Therapy 8/22/2019   Visit Number 6   VAS Pain Rating 5   Treadmill Time (in min.) 10   Speed 1.2   Incline 0   Lumbar Flexion 45   Lumbar Extension 0   Lumbar Weight 60   Repetitions 20   Rating of Perceived Exertion 3   Ice - Z Lie (in min.) 0         Peripheral muscle strengthening which included 1 set of 15-20 repetitions at a slow, controlled 10-13 second per rep pace focused on strengthening supporting musculature for improved body mechanics and functional mobility.  Pt and therapist focused on proper form during treatment to ensure optimal strengthening of each targeted muscle group.  Machines were utilized including torso rotation, leg extension, leg curl, chest press, upright row. Tricep extension, bicep curl, leg press, and hip abduction. Shuttle MVP was performed instead of leg press.     Home Exercises Provided and Patient Education Provided     Education provided:   - reasoning behind extension-based exercises    Written Home Exercises Provided: Patient instructed to cont prior HEP.  Exercises were reviewed and Audrey was able to demonstrate them prior to the end of the session.  Audrey demonstrated good  understanding of the education provided.     See EMR under Patient Instructions for exercises provided prior visit.    Assessment   Pt arrives to appt after attending 1 in her last 6 appts. She has missed appts from helping her sister in [place] with her nephew and dealing with the stomach bug. She needs to improve her compliance so she will be able to progress more appropriately with the [facility] program.     Pt tolerated increase to 60 ft.lbs, 20 reps with RPE of 3/10 today. Medx lumbar range of motion was from 0 to 45 deg today. Pt tolerated peripheral muscle strength well. Pt performed shuttle MVP instead of leg press. Continue progressing patient per [facility] protocol.      Patient is making fair progress towards established goals.  Pt will " continue to benefit from skilled outpatient physical therapy to address the deficits stated in the impairment chart, provide pt/family education and to maximize pt's level of independence in the home and community environment.     Anticipated Barriers for therapy: compliance   Pt's spiritual, cultural and educational needs considered and pt agreeable to plan of care and goals as stated below:     Goals:   Short term goals:  5 weeks or 10 visits   1.  Pt will demonstrate increased lumbar ROM by at least 3 degrees from the initial ROM value with improvements noted in functional ROM and ability to perform ADLs. - progressing  2.  Pt will demonstrate increased maximum isometric torque value by 25% when compared to the initial value resulting in improved ability to perform bending, lifting, and carrying activities safely, confidently. - progressing  3.  Patient report a reduction in worst pain score by 1-2 points for improved tolerance for walking and household chores. - progressing  4.  Pt able to perform HEP correctly with minimal cueing or supervision from therapist to encourage independent management of symptoms. - progressing     Long term goals: 10 weeks or 20 visits   1. Pt will demonstrate increased lumbar ROM by at least 6 degrees from initial ROM value, resulting in improved ability to perform functional fwd bending while standing and sitting.   2. Pt will demonstrate increased maximum isometric torque value by 50% when compared to the initial value resulting in improved ability to perform bending, lifting, and carrying activities safely, confidently.  3. Pt to demonstrate ability to independently control and reduce their pain through posture positioning and mechanical movements throughout a typical day.  4.  Pt will demonstrate reduced pain and improved functional outcomes as reported on the FOTO by reaching a score of CJ = at least 20% but < 40% impaired, limited or restricted or less in order to demonstrate  subjective improvement in pt's condition.    5. Pt will demonstrate independence with the HEP at discharge  6.  Pt will be able to perform daily household chores with little to no low back pain.     Plan     Plan of Care Certification period: 7/1/19-10/1/19    Continue with 2x/wk. Continue with established Plan of Care towards established PT goals. Progress per [facility] protocol as tolerated.      Herson Giron, PT  8/22/2019

## 2019-09-01 ENCOUNTER — HOSPITAL ENCOUNTER (EMERGENCY)
Facility: HOSPITAL | Age: 49
Discharge: HOME | End: 2019-09-01
Attending: EMERGENCY MEDICINE
Payer: MEDICAID

## 2019-09-01 VITALS
BODY MASS INDEX: 37.32 KG/M2 | HEIGHT: 65 IN | WEIGHT: 224 LBS | SYSTOLIC BLOOD PRESSURE: 125 MMHG | DIASTOLIC BLOOD PRESSURE: 60 MMHG | HEART RATE: 84 BPM | RESPIRATION RATE: 18 BRPM | OXYGEN SATURATION: 98 % | TEMPERATURE: 99 F

## 2019-09-01 DIAGNOSIS — L03.90 CELLULITIS, UNSPECIFIED CELLULITIS SITE: Primary | ICD-10-CM

## 2019-09-01 DIAGNOSIS — M79.606 LEG PAIN: ICD-10-CM

## 2019-09-01 DIAGNOSIS — I82.592 CHRONIC DEEP VEIN THROMBOSIS (DVT) OF OTHER VEIN OF LEFT LOWER EXTREMITY: ICD-10-CM

## 2019-09-01 DIAGNOSIS — M79.604 RIGHT LEG PAIN: ICD-10-CM

## 2019-09-01 LAB
ALBUMIN SERPL BCP-MCNC: 3.6 G/DL (ref 3.5–5.2)
ALP SERPL-CCNC: 83 U/L (ref 55–135)
ALT SERPL W/O P-5'-P-CCNC: 12 U/L (ref 10–44)
ANION GAP SERPL CALC-SCNC: 14 MMOL/L (ref 8–16)
AST SERPL-CCNC: 11 U/L (ref 10–40)
BASOPHILS # BLD AUTO: 0.09 K/UL (ref 0–0.2)
BASOPHILS NFR BLD: 0.7 % (ref 0–1.9)
BILIRUB SERPL-MCNC: 0.2 MG/DL (ref 0.1–1)
BUN SERPL-MCNC: 6 MG/DL (ref 6–20)
CALCIUM SERPL-MCNC: 8.9 MG/DL (ref 8.7–10.5)
CHLORIDE SERPL-SCNC: 96 MMOL/L (ref 95–110)
CK SERPL-CCNC: 60 U/L (ref 20–180)
CO2 SERPL-SCNC: 24 MMOL/L (ref 23–29)
CREAT SERPL-MCNC: 0.7 MG/DL (ref 0.5–1.4)
DIFFERENTIAL METHOD: ABNORMAL
EOSINOPHIL # BLD AUTO: 0.6 K/UL (ref 0–0.5)
EOSINOPHIL NFR BLD: 4.5 % (ref 0–8)
ERYTHROCYTE [DISTWIDTH] IN BLOOD BY AUTOMATED COUNT: 14.3 % (ref 11.5–14.5)
EST. GFR  (AFRICAN AMERICAN): >60 ML/MIN/1.73 M^2
EST. GFR  (NON AFRICAN AMERICAN): >60 ML/MIN/1.73 M^2
GLUCOSE SERPL-MCNC: 237 MG/DL (ref 70–110)
HCT VFR BLD AUTO: 40.9 % (ref 37–48.5)
HGB BLD-MCNC: 13.1 G/DL (ref 12–16)
INR PPP: 0.9 (ref 0.8–1.2)
LYMPHOCYTES # BLD AUTO: 4.4 K/UL (ref 1–4.8)
LYMPHOCYTES NFR BLD: 33.1 % (ref 18–48)
MCH RBC QN AUTO: 29.1 PG (ref 27–31)
MCHC RBC AUTO-ENTMCNC: 32 G/DL (ref 32–36)
MCV RBC AUTO: 91 FL (ref 82–98)
MONOCYTES # BLD AUTO: 1.3 K/UL (ref 0.3–1)
MONOCYTES NFR BLD: 10.2 % (ref 4–15)
NEUTROPHILS # BLD AUTO: 6.7 K/UL (ref 1.8–7.7)
NEUTROPHILS NFR BLD: 51.5 % (ref 38–73)
PLATELET # BLD AUTO: 482 K/UL (ref 150–350)
PMV BLD AUTO: 9.7 FL (ref 9.2–12.9)
POTASSIUM SERPL-SCNC: 3.9 MMOL/L (ref 3.5–5.1)
PROT SERPL-MCNC: 6.5 G/DL (ref 6–8.4)
PROTHROMBIN TIME: 9.6 SEC (ref 9–12.5)
RBC # BLD AUTO: 4.5 M/UL (ref 4–5.4)
SODIUM SERPL-SCNC: 134 MMOL/L (ref 136–145)
WBC # BLD AUTO: 13.19 K/UL (ref 3.9–12.7)

## 2019-09-01 PROCEDURE — 82550 ASSAY OF CK (CPK): CPT

## 2019-09-01 PROCEDURE — 85610 PROTHROMBIN TIME: CPT

## 2019-09-01 PROCEDURE — 85025 COMPLETE CBC W/AUTO DIFF WBC: CPT

## 2019-09-01 PROCEDURE — 99284 EMERGENCY DEPT VISIT MOD MDM: CPT | Mod: 25

## 2019-09-01 PROCEDURE — 80053 COMPREHEN METABOLIC PANEL: CPT

## 2019-09-01 PROCEDURE — 25000003 PHARM REV CODE 250: Performed by: EMERGENCY MEDICINE

## 2019-09-01 RX ORDER — LIDOCAINE 30 MG/G
CREAM TOPICAL
Qty: 1 TUBE | Refills: 0 | Status: SHIPPED | OUTPATIENT
Start: 2019-09-01 | End: 2020-06-16 | Stop reason: SDUPTHER

## 2019-09-01 RX ORDER — ENOXAPARIN SODIUM 100 MG/ML
1 INJECTION SUBCUTANEOUS 2 TIMES DAILY
Qty: 10 ML | Refills: 0 | Status: SHIPPED | OUTPATIENT
Start: 2019-09-01 | End: 2019-09-04 | Stop reason: ALTCHOICE

## 2019-09-01 RX ORDER — LIDOCAINE 50 MG/G
1 PATCH TOPICAL
Status: DISCONTINUED | OUTPATIENT
Start: 2019-09-01 | End: 2019-09-01 | Stop reason: HOSPADM

## 2019-09-01 RX ORDER — ACETAMINOPHEN 500 MG
1000 TABLET ORAL
Status: COMPLETED | OUTPATIENT
Start: 2019-09-01 | End: 2019-09-01

## 2019-09-01 RX ORDER — IBUPROFEN 400 MG/1
400 TABLET ORAL
Status: COMPLETED | OUTPATIENT
Start: 2019-09-01 | End: 2019-09-01

## 2019-09-01 RX ORDER — CLINDAMYCIN HYDROCHLORIDE 150 MG/1
300 CAPSULE ORAL 4 TIMES DAILY
Qty: 56 CAPSULE | Refills: 0 | Status: SHIPPED | OUTPATIENT
Start: 2019-09-01 | End: 2019-09-08

## 2019-09-01 RX ADMIN — LIDOCAINE 1 PATCH: 50 PATCH TOPICAL at 09:09

## 2019-09-01 RX ADMIN — IBUPROFEN 400 MG: 400 TABLET, FILM COATED ORAL at 09:09

## 2019-09-01 RX ADMIN — ACETAMINOPHEN 1000 MG: 500 TABLET ORAL at 01:09

## 2019-09-01 NOTE — ED TRIAGE NOTES
Pt arrived to the ED via POV from home with c/o right leg pain. Pt reports pain in right calf and foot pain that started approx x3 days; hurts more when ambulating. Denies SOB, chest pain/discomfort, headaches, blurry vision, numbness/tingling, dizziness/weakness. On admit to ED bed, pt AAOx4, NAD noted, VSS. Pt placed on cardiac monitor, pulse ox and BP cuff. Rates pain 8/10 on pain scale.

## 2019-09-01 NOTE — ED PROVIDER NOTES
"Encounter Date: 9/1/2019    SCRIBE #1 NOTE: I, Negin Ventura, am scribing for, and in the presence of,  Negin Burleson MD. I have scribed the following portions of the note - Other sections scribed: HPI, ROS, PE.       History     Chief Complaint   Patient presents with    Leg Pain     right leg pain/calf area.  + hx DVT.       CC: Right leg pain    HPI: This is a 49 y.o. female with a PMHx of diabetes, COPD, CAD, DVT, and blood clots who presents to the Emergency Department with a cc of "excruciating" right lower extremity pain for four days. Pt denies shortness of breath, fever, chills, nausea, or vomiting. Symptoms are worsened by walking and are slightly alleviated with rest. She states she has been taking muscle relaxers every 3-4 hours with little relief. Patient reports that her pain is similar to previous blood clots, her last one being in 2014. Patient is a current smoker, but does not drink or do drugs. She is allergic to Penicillin and Morphine.        The history is provided by the patient. No  was used.     Review of patient's allergies indicates:   Allergen Reactions    Morphine Other (See Comments)     Patient had a psychotic episode after taking Morphine  Agitation, hallucinations    Penicillins Anaphylaxis     itching     Past Medical History:   Diagnosis Date    ADHD (attention deficit hyperactivity disorder)     Arthritis     Asthma     Bipolar 1 disorder     Cataract     COPD (chronic obstructive pulmonary disease)     Coronary artery disease     A fib    Depression     bipolar manic depresson    Diabetes mellitus     DVT of lower extremity, bilateral July 2013    bilateral LE DVT. [place] filter placed.     Encounter for blood transfusion     History of blood clots 1. Left Leg=2003; 2.Bilateral Groin=Blood Clots= 5 or 6/ 2013 & 7/2013; 3. LLL of Lung=7/2013;  4. Lt. Lower Leg=7/2013.     Pt. had 1st Blood Clot after [id number]=8667, & Last=2013. " "[place] Filter= Rt.Lateral Neck.    HTN (hypertension) 6/6/2013    Pt states that she does not have hypertension    Hypercholesteremia     Irregular heartbeat     Neuromuscular disorder     neuropathy feet    PE (pulmonary embolism) July 2013     bilat LE DVT.     Restless leg syndrome      Past Surgical History:   Procedure Laterality Date    ABDOMINAL SURGERY      BILATERAL OOPHORECTOMY Bilateral 1/12/2015    BIOPSY, BLADDER  9/18/2013    [facility]    CYSTOSCOPY N/A 9/18/2013    [facility] OR    ESOPHAGOGASTRODUODENOSCOPY (EGD) N/A 8/10/2016    [facility] OR    ESOPHAGOGASTRODUODENOSCOPY (EGD) N/A 10/8/2014    Performed by Jarocho Fowler [facility] at [facility] ENDO    FULGURATION, BLADDER  9/18/2013    [facility] OR    GASTRECTOMY-SLEEVE-LAPAROSCOPIC N/A 8/10/2016    [facility] OR    Green' s filter Right 7/4/2012    Right Neck & Tunneled Down.    HERNIA REPAIR      "[place] Hern[place] [employer] around th Belly Button.", pt. states    IRRIGATION, BLADDER N/A 9/18/2013    [facility] OR    LAPAROSCOPY W/REMOVAL OF INFECTED MESH  4/13/2015    [facility] OR    LAPAROTOMY-EXPLORATORY Bilateral 1/12/2015    [facility] 2ND F[facility]    OOPHORECTOMY      OVARIAN CYST REMOVAL  3/13/2014    OH REMOVAL OF OVARY/TUBE(S)      REPAIR, HERNIA, VENTRAL, LAPAROSCOPIC N/A 3/14/2014    [facility] OR    IMMTZOEZ-TSTHFUBMUHMY-KIWTFNTLC (BSO) Bilateral 1/12/2015    [facility] 2ND FLR    SPLENECTOMY, TOTAL  July 2003    TONSILLECTOMY      as a child    TYMPANOSTOMY TUBE PLACEMENT  1976    VEIN SURGERY  2003    Lt leg     Family History   Problem Relation Age of Onset    Hypertension Father     Diabetes " Father     Heart disease Father     Cataracts Father     Diabetes Paternal Grandfather     Heart disease Paternal Grandfather     No Known Problems Mother     Ovarian cancer Maternal Grandmother          from this. ? age     No Known Problems Sister     No Known Problems Brother     No Known Problems Maternal Aunt     No Known Problems Maternal Uncle     No Known Problems Paternal Aunt     No Known Problems Paternal Uncle     No Known Problems Maternal Grandfather     Ovarian cancer Paternal Grandmother     Uterine cancer Neg Hx     Breast cancer Neg Hx     Colon cancer Neg Hx     Amblyopia Neg Hx     Blindness Neg Hx     Cancer Neg Hx     Glaucoma Neg Hx     Macular degeneration Neg Hx     Retinal detachment Neg Hx     Strabismus Neg Hx     Stroke Neg Hx     Thyroid disease Neg Hx      Social History     Tobacco Use    Smoking status: Current Every Day Smoker     Packs/day: 0.50     Years: 25.00     Pack years: 12.50     Types: Cigarettes    Smokeless tobacco: Never Used   Substance Use Topics    Alcohol use: No     Alcohol/week: 0.0 oz    Drug use: No     Review of Systems   Constitutional: Negative for chills and fever.   HENT: Negative for sore throat.    Respiratory: Negative for shortness of breath.    Cardiovascular: Negative for chest pain.   Gastrointestinal: Negative for nausea and vomiting.   Genitourinary: Negative for dysuria.   Musculoskeletal: Negative for back pain.        Positive for right leg pain   Skin: Negative for rash.   Neurological: Negative for weakness.   Hematological: Does not bruise/bleed easily.       Physical Exam     Initial Vitals [19 0824]   BP Pulse Resp Temp SpO2   120/70 88 18 99.1 °F (37.3 °C) 98 %      MAP       --         Physical Exam    Nursing note and vitals reviewed.  Constitutional: She appears well-nourished.   HENT:   Head: Normocephalic.   Eyes: Conjunctivae are normal. No scleral icterus.   Neck: Normal range of motion. Neck  supple.   Cardiovascular: Normal heart sounds and intact distal pulses.   No murmur heard.  Pulses:       Dorsalis pedis pulses are 2+ on the right side, and 2+ on the left side.   Pulmonary/Chest: Breath sounds normal. No respiratory distress.   Positive Shaq's sign   Abdominal: Soft. Bowel sounds are normal.   Musculoskeletal: Normal range of motion. She exhibits no edema.   Inner thigh tender  Right calf tender  No obvious deformity of the knee or hip-although exam is limited by body habitus.  Able to range both her hip and her knee and weightbear       Neurological: She is alert.   Skin: Skin is warm and dry. Capillary refill takes less than 2 seconds.   No warmth, redness, edema, induration  No bulging veins  No discoloration\  Toes pink     Psychiatric: She has a normal mood and affect. Her behavior is normal. Judgment and thought content normal.         ED Course   Procedures  Labs Reviewed - No data to display     49-year-old female with a history of DVT, PE, cellulitis multiple times complications requiring ICU admission presents with 4 days of right leg pain worse when she walks or squeeze or calf.  My differential diagnosis includes DVT versus cellulitis versus bone pathology versus other musculoskeletal etiology directly related to the joint or secondary to compensation from hip.  Patient will be evaluated with an ultrasound, labs, and re-evaluation.  Patient has no symptoms concerning for PE including palpitations, shortness of breath, chest pain a and vitals are all within normal limits on exam.  Will provide pain control and continue to monitor       Imaging Results    None                     Scribe Attestation:   Scribe #1: I performed the above scribed service and the documentation accurately describes the services I performed. I attest to the accuracy of the note.            ED Course as of Sep 01 1638   Sun Sep 01, 2019   1322 Patient's ultrasound results with the questionable new for stable DVT  of the left leg.  Right leg was clear.  White count is elevated at 13.9, CK is within normal limits. X-ray of hips are normal for age and body habitus.  Will add x-ray of right knee and tib-fib.  Clinically at this time I think what she has a cellulitis of the right lower extremity.  Will add antibiotics.  Awaiting x-ray results.  Regards to her DVT on the left leg will resume anticoagulation therapy.  Called [facility] to have her admitted for Coumadin bridging but they state that there is an outpatient program to give her 5 days with the Lovenox and follow up in Coumadin Clinic.  Will try to arrange for this.  ADONIS Burleson MD  1:27 PM          [AJ]      ED Course User Index  [AJ] Negin Burleson MD       Was able to arrange for outpatient Lovenox and Coumadin Clinic follow-up and follow up by her PCP with the help of Brittanie Mixon and social work.  Patient is well known to both of these processes and is familiar with the care plan provided here today.  She should take the Lovenox and follow up Coumadin Clinic to be bridged over.  Also follow up with PCP for both Coumadin and to check her leg in 48 hr to assure that she is improving.  We will send her home with lidocaine cream as has used this before and worse for her.  Recommend Tylenol for pain. Voiced understanding that if anything changes worsens or any concerns arise she should immediately return to the ED.  She states she disclosed that would be a problem.  Patient was discharged in good condition.  ADONIS Burleson MD  5:20 PM      Clinical Impression:     1. Right leg pain    2. Right leg pain               I, Negin Burleson, personally performed the services described in this documentation. All medical record entries made by the scribe were at my direction and in my presence.  I have reviewed the chart and agree that the record reflects my personal performance and is accurate and complete.                    Negin Burleson MD  09/01/19 0831

## 2019-09-02 DIAGNOSIS — M54.50 CHRONIC BILATERAL LOW BACK PAIN WITHOUT SCIATICA: ICD-10-CM

## 2019-09-02 DIAGNOSIS — G89.29 CHRONIC BILATERAL LOW BACK PAIN WITHOUT SCIATICA: ICD-10-CM

## 2019-09-03 ENCOUNTER — ANTI-COAG VISIT (OUTPATIENT)
Dept: CARDIOLOGY | Facility: CLINIC | Age: 49
End: 2019-09-03

## 2019-09-03 ENCOUNTER — TELEPHONE (OUTPATIENT)
Dept: FAMILY MEDICINE | Facility: CLINIC | Age: 49
End: 2019-09-03

## 2019-09-03 ENCOUNTER — TELEPHONE (OUTPATIENT)
Dept: PAIN MEDICINE | Facility: CLINIC | Age: 49
End: 2019-09-03

## 2019-09-03 DIAGNOSIS — I82.5Y2 CHRONIC DEEP VEIN THROMBOSIS (DVT) OF PROXIMAL VEIN OF LEFT LOWER EXTREMITY: Primary | ICD-10-CM

## 2019-09-03 DIAGNOSIS — Z86.718 HISTORY OF DVT (DEEP VEIN THROMBOSIS): ICD-10-CM

## 2019-09-03 DIAGNOSIS — Z79.01 LONG TERM (CURRENT) USE OF ANTICOAGULANTS: Primary | ICD-10-CM

## 2019-09-03 RX ORDER — MELOXICAM 15 MG/1
TABLET ORAL
Qty: 30 TABLET | Refills: 0 | Status: SHIPPED | OUTPATIENT
Start: 2019-09-03 | End: 2019-10-16 | Stop reason: SDUPTHER

## 2019-09-03 RX ORDER — WARFARIN SODIUM 5 MG/1
5 TABLET ORAL DAILY
Qty: 30 TABLET | Refills: 1 | Status: SHIPPED | OUTPATIENT
Start: 2019-09-03 | End: 2019-09-04 | Stop reason: ALTCHOICE

## 2019-09-03 NOTE — TELEPHONE ENCOUNTER
----- Message from Lucille Santos sent at 9/3/2019  8:49 AM CDT -----  Contact: Patient   Type: Lab    Caller is requesting to schedule their Lab appointment prior to annual appointment.    Order is not listed in EPIC.  Please enter order and contact patient to schedule.    Name of Caller: Patient     Preferred Date and Time of Labs: 9/4/2019    Date of EPP Appointment:9/4/2019    [place] [facility] [place]    Would the patient rather a call back or a response via My [facility]? Call back     Best Call Back Number: [phone]    Additional Information:

## 2019-09-03 NOTE — PROGRESS NOTES
"50 y/o female recently seen in ED for LE pain. Per imaging :Thrombosis of 1 of the left posterior tibial veins.  Please note that the patient was noted to have thrombosis of the left posterior tibial veins on previous study of 2017.  The immediate acuity of this finding is uncertain." Her other PMH includes DVT (2003, 2013 s/p [place] filter), PE (2013), atrial fibrillation (CHADSvasc=4), DM, COPD, bipolar d/o, ADHD, arthritis, asthma, CAD, HTN, HLD, depression, restless leg syndrome.    Patient was d/c from ED with LMWH bridge. No Rx for warfarin on medication list so will need to confirm home dose with patient or request Rx be sent by MD.  "

## 2019-09-03 NOTE — PROGRESS NOTES
Patient confirmed she does not have a Coumadin prescription and just given a prescription for Lovenox in the Emergency Department.  She has 6 syringes of Lovenox left.  I instructed her that we would contact Dr Pena to write the first Coumadin prescription to send an escript to her pharmacy to  today.  Patient instructed to start taking Coumadin today 1 tab daily after 5pm, continue taking Lovenox every 12 hours as prescribed, INR on 9/6/19, clinic education appointment on 9/12/19,  if she does not start Coumadin on 9/3/19 she is to contact the Coumadin Clinic to notify, and call back to confirm strength Coumadin tablet once she picks up prescription today which she verbalized understanding.  I called Dr Pena's office to speak to a nurse but none were available to come to the phone and left a message with .  I sent a staff message and CC'd Dr Pena to write a Coumadin prescription/escript today to patient's pharmacy.  Chart routed to pharmacist.  Dr Pena sent escript for Coumadin and I left a voice message on the patient voice mail to inform to .    9/4/19  I left voice message for patient to call back to confirm that she did  Coumadin prescription and to verify strength tab of Coumadin.

## 2019-09-03 NOTE — TELEPHONE ENCOUNTER
----- Message from Susan Saucedo MA sent at 9/3/2019  9:01 AM CDT -----  Contact: Patient   Patient requesting lab orders   ----- Message -----  From: Lucille Santos  Sent: 9/3/2019   8:49 AM  To: Becky Fulton Staff    Type: Lab    Caller is requesting to schedule their Lab appointment prior to annual appointment.    Order is not listed in EPIC.  Please enter order and contact patient to schedule.    Name of Caller: Patient     Preferred Date and Time of Labs: 9/4/2019    Date of EPP Appointment:9/4/2019    [place] [facility] [place]    Would the patient rather a call back or a response via My [facility]? Call back     Best Call Back Number: [phone]    Additional Information:

## 2019-09-03 NOTE — TELEPHONE ENCOUNTER
----- Message from Iram Agrawal RN sent at 9/3/2019  9:52 AM CDT -----  Regarding: referral made by ED MD to Coumadin Clinic  This patient was referred to the Coumadin Clinic after an Emergency Department visit on 9/1/19 and you were listed as the responsible physician.  She was discharged on Lovenox only.  Please send an escript for Coumadin to the patient's pharmacy today as it is our clinic's policy the responsible physician has to the first Coumadin prescription then we do all the monitoring and prescription refills.  We needs this patient to start on Coumadin today as her plan of care.  If there are any questions or concerns please contact us at [phone].    Iram Agrawal  Coumadin Clinic  [facility] COUMAD PROVIDER [facility]  Ph [phone]

## 2019-09-04 ENCOUNTER — ANTI-COAG VISIT (OUTPATIENT)
Dept: CARDIOLOGY | Facility: CLINIC | Age: 49
End: 2019-09-04

## 2019-09-04 ENCOUNTER — OFFICE VISIT (OUTPATIENT)
Dept: FAMILY MEDICINE | Facility: CLINIC | Age: 49
End: 2019-09-04
Payer: MEDICAID

## 2019-09-04 VITALS
HEART RATE: 104 BPM | DIASTOLIC BLOOD PRESSURE: 74 MMHG | BODY MASS INDEX: 36.87 KG/M2 | RESPIRATION RATE: 17 BRPM | OXYGEN SATURATION: 97 % | SYSTOLIC BLOOD PRESSURE: 133 MMHG | HEIGHT: 65 IN | TEMPERATURE: 98 F | WEIGHT: 221.31 LBS

## 2019-09-04 DIAGNOSIS — Z23 NEED FOR INFLUENZA VACCINATION: ICD-10-CM

## 2019-09-04 DIAGNOSIS — I10 ESSENTIAL HYPERTENSION: ICD-10-CM

## 2019-09-04 DIAGNOSIS — Z86.718 HISTORY OF DVT (DEEP VEIN THROMBOSIS): ICD-10-CM

## 2019-09-04 DIAGNOSIS — I87.303 VENOUS HYPERTENSION OF BOTH LOWER EXTREMITIES: Primary | ICD-10-CM

## 2019-09-04 DIAGNOSIS — Z79.01 LONG TERM (CURRENT) USE OF ANTICOAGULANTS: ICD-10-CM

## 2019-09-04 PROCEDURE — 99214 OFFICE O/P EST MOD 30 MIN: CPT | Mod: S$PBB,,, | Performed by: INTERNAL MEDICINE

## 2019-09-04 PROCEDURE — 99999 PR PBB SHADOW E&M-EST. PATIENT-LVL V: ICD-10-PCS | Mod: PBBFAC,,, | Performed by: INTERNAL MEDICINE

## 2019-09-04 PROCEDURE — 99215 OFFICE O/P EST HI 40 MIN: CPT | Mod: PBBFAC,PN,25 | Performed by: INTERNAL MEDICINE

## 2019-09-04 PROCEDURE — 90686 IIV4 VACC NO PRSV 0.5 ML IM: CPT | Mod: PBBFAC,PN

## 2019-09-04 PROCEDURE — 99214 PR OFFICE/OUTPT VISIT, EST, LEVL IV, 30-39 MIN: ICD-10-PCS | Mod: S$PBB,,, | Performed by: INTERNAL MEDICINE

## 2019-09-04 PROCEDURE — 99999 PR PBB SHADOW E&M-EST. PATIENT-LVL V: CPT | Mod: PBBFAC,,, | Performed by: INTERNAL MEDICINE

## 2019-09-04 RX ORDER — FUROSEMIDE 20 MG/1
TABLET ORAL
Qty: 30 TABLET | Refills: 2 | Status: SHIPPED | OUTPATIENT
Start: 2019-09-04 | End: 2019-12-03 | Stop reason: SDUPTHER

## 2019-09-04 NOTE — PROGRESS NOTES
Per Dr. Pena -   Patient does not need anticoagulation, please discharge from coumadin clinic, I have relayed this directly to her as well. Thank you

## 2019-09-04 NOTE — PROGRESS NOTES
"Subjective:       Patient ID: Audrey Natarajan is a 49 y.o. female.    Chief Complaint: Follow-up (blood clog /L leg) and Establish Care    ED f/u    HPI: 50 y/o w/ DM morbid obesity chronic lwo back pain went to ED four days ago for worsenign dorsal RIGHT foot pain. Felt to have cellulitis due to erythema and edema. Venous ultrasound of both legs found non visualization of flow through one of the LEFT posterior tibial vein. She has had  Vein extraction here from trauma she has no swelling of the left leg or foot no shortness of breath. She is taking clindamycin as prescribed right foot pain improved no LE swelling. seh was getting more calf claudication symptoms last I saw her two weeks ago she reports this has improved but still occasionally present     Review of Systems   Constitutional: Negative for activity change, appetite change, fatigue, fever and unexpected weight change.   HENT: Negative for ear pain, rhinorrhea and sore throat.    Eyes: Negative for discharge and visual disturbance.   Respiratory: Negative for chest tightness, shortness of breath and wheezing.    Cardiovascular: Negative for chest pain, palpitations and leg swelling.   Gastrointestinal: Negative for abdominal pain, constipation and diarrhea.   Endocrine: Negative for cold intolerance and heat intolerance.   Genitourinary: Negative for dysuria and hematuria.   Musculoskeletal: Positive for arthralgias and back pain. Negative for joint swelling and neck stiffness.   Skin: Negative for rash.   Neurological: Negative for dizziness, syncope, weakness and headaches.   Psychiatric/Behavioral: Negative for suicidal ideas.       Objective:     Vitals:    09/04/19 0911   BP: 133/74   BP Location: Right arm   Patient Position: Sitting   Pulse: 104   Resp: 17   Temp: 98.4 °F (36.9 °C)   TempSrc: Oral   SpO2: 97%   Weight: 100.4 kg (221 lb 5.5 oz)   Height: 5' 5" (1.651 m)          Physical Exam   Constitutional: She is oriented to person, place, " and time. She appears well-developed and well-nourished.   HENT:   Head: Normocephalic and atraumatic.   Eyes: Pupils are equal, round, and reactive to light. Conjunctivae are normal.   Neck: Normal range of motion.   Cardiovascular: Normal rate and regular rhythm. Exam reveals no gallop and no friction rub.   No murmur heard.  No pitting edema superficial varicocities bilaterally   Pulmonary/Chest: Effort normal and breath sounds normal. She has no wheezes. She has no rales.   Abdominal: Soft. Bowel sounds are normal. There is no tenderness. There is no rebound and no guarding.   Musculoskeletal: Normal range of motion. She exhibits no edema or tenderness.   Neurological: She is alert and oriented to person, place, and time. No cranial nerve deficit.   Skin: Skin is warm and dry.   Psychiatric: She has a normal mood and affect.       Assessment and Plan   1. Venous hypertension of both lower extremities  Her lower extremity ultrasound showed lack of second posterior tibal vein which was interperatyed as a DVT, this is more likely physiological inl ight of her past surgery. No indication for anticoagulation from DVT standpoint she does have superficial varicocites will also get JEYSON to measure arterial flow. Discontinue lovenox and coumadin   - Ambulatory referral to Vascular Surgery    2. Need for influenza vaccination  Flu vaccine today

## 2019-09-04 NOTE — Clinical Note
Patient does not need anticoagulation, please discharge from coumadin clinic, I have relayed this directly to her as well. Thank you

## 2019-09-10 ENCOUNTER — PATIENT OUTREACH (OUTPATIENT)
Dept: ADMINISTRATIVE | Facility: OTHER | Age: 49
End: 2019-09-10

## 2019-09-10 DIAGNOSIS — E11.9 DM TYPE 2 WITHOUT RETINOPATHY: Primary | ICD-10-CM

## 2019-09-10 DIAGNOSIS — J44.9 CHRONIC OBSTRUCTIVE PULMONARY DISEASE, UNSPECIFIED COPD TYPE: Chronic | ICD-10-CM

## 2019-09-10 DIAGNOSIS — M54.50 CHRONIC MIDLINE LOW BACK PAIN WITHOUT SCIATICA: ICD-10-CM

## 2019-09-10 DIAGNOSIS — G89.29 CHRONIC MIDLINE LOW BACK PAIN WITHOUT SCIATICA: ICD-10-CM

## 2019-09-10 RX ORDER — MOMETASONE FUROATE AND FORMOTEROL FUMARATE DIHYDRATE 100; 5 UG/1; UG/1
AEROSOL RESPIRATORY (INHALATION)
Qty: 13 G | Refills: 0 | Status: SHIPPED | OUTPATIENT
Start: 2019-09-10 | End: 2019-09-25 | Stop reason: DRUGHIGH

## 2019-09-11 RX ORDER — METHOCARBAMOL 500 MG/1
TABLET, FILM COATED ORAL
Qty: 100 TABLET | Refills: 0 | Status: SHIPPED | OUTPATIENT
Start: 2019-09-11 | End: 2019-10-12 | Stop reason: SDUPTHER

## 2019-09-12 ENCOUNTER — CLINICAL SUPPORT (OUTPATIENT)
Dept: REHABILITATION | Facility: HOSPITAL | Age: 49
End: 2019-09-12
Attending: INTERNAL MEDICINE
Payer: MEDICAID

## 2019-09-12 ENCOUNTER — OFFICE VISIT (OUTPATIENT)
Dept: VASCULAR SURGERY | Facility: CLINIC | Age: 49
End: 2019-09-12
Payer: MEDICAID

## 2019-09-12 VITALS
SYSTOLIC BLOOD PRESSURE: 136 MMHG | DIASTOLIC BLOOD PRESSURE: 82 MMHG | BODY MASS INDEX: 38.21 KG/M2 | WEIGHT: 229.38 LBS | HEIGHT: 65 IN | HEART RATE: 108 BPM

## 2019-09-12 DIAGNOSIS — G89.29 CHRONIC LEFT-SIDED LOW BACK PAIN WITH LEFT-SIDED SCIATICA: ICD-10-CM

## 2019-09-12 DIAGNOSIS — I82.542 CHRONIC DEEP VEIN THROMBOSIS (DVT) OF TIBIAL VEIN OF LEFT LOWER EXTREMITY: Primary | ICD-10-CM

## 2019-09-12 DIAGNOSIS — I87.303 VENOUS HYPERTENSION OF BOTH LOWER EXTREMITIES: ICD-10-CM

## 2019-09-12 DIAGNOSIS — M25.60 DECREASED RANGE OF MOTION: ICD-10-CM

## 2019-09-12 DIAGNOSIS — M54.42 CHRONIC LEFT-SIDED LOW BACK PAIN WITH LEFT-SIDED SCIATICA: ICD-10-CM

## 2019-09-12 DIAGNOSIS — R53.1 DECREASED STRENGTH, ENDURANCE, AND MOBILITY: ICD-10-CM

## 2019-09-12 DIAGNOSIS — I87.2 VENOUS INSUFFICIENCY OF RIGHT LEG: ICD-10-CM

## 2019-09-12 DIAGNOSIS — R68.89 DECREASED STRENGTH, ENDURANCE, AND MOBILITY: ICD-10-CM

## 2019-09-12 DIAGNOSIS — Z74.09 DECREASED STRENGTH, ENDURANCE, AND MOBILITY: ICD-10-CM

## 2019-09-12 PROCEDURE — 99204 PR OFFICE/OUTPT VISIT, NEW, LEVL IV, 45-59 MIN: ICD-10-PCS | Mod: S$PBB,,, | Performed by: SURGERY

## 2019-09-12 PROCEDURE — 99213 OFFICE O/P EST LOW 20 MIN: CPT | Mod: PBBFAC | Performed by: SURGERY

## 2019-09-12 PROCEDURE — 99204 OFFICE O/P NEW MOD 45 MIN: CPT | Mod: S$PBB,,, | Performed by: SURGERY

## 2019-09-12 PROCEDURE — 99999 PR PBB SHADOW E&M-EST. PATIENT-LVL III: CPT | Mod: PBBFAC,,, | Performed by: SURGERY

## 2019-09-12 PROCEDURE — 99999 PR PBB SHADOW E&M-EST. PATIENT-LVL III: ICD-10-PCS | Mod: PBBFAC,,, | Performed by: SURGERY

## 2019-09-12 PROCEDURE — 97110 THERAPEUTIC EXERCISES: CPT | Mod: PN | Performed by: PHYSICAL MEDICINE & REHABILITATION

## 2019-09-12 RX ORDER — DUPILUMAB 300 MG/2ML
INJECTION, SOLUTION SUBCUTANEOUS
Refills: 6 | Status: ON HOLD | COMMUNITY
Start: 2019-08-23 | End: 2021-02-20 | Stop reason: HOSPADM

## 2019-09-12 NOTE — LETTER
September 15, 2019      Donaldo Pena MD  [address] [facility] [address]           [facility]  [address] [facility] [address]  [facility] [address]  Phone: [phone]  Fax: [phone]          Patient: Audrey Natarajan   MR Number: [id number]   YOB: 1970   Date of Visit: 9/12/2019       Dear Dr. Donaldo Pena:    Thank you for referring Audrey Natarajan to me for evaluation. Attached you will find relevant portions of my assessment and plan of care.    If you have questions, please do not hesitate to call me. I look forward to following Audrey Natarajan along with you.    Sincerely,    Tone Mata MD    Enclosure  CC:  No Recipients    If you would like to receive this communication electronically, please contact [email or web address] or [phone] to request more information on [facility] access.    For providers and/or their staff who would like to refer a patient to [facility], please contact us through our one-stop-shop provider referral line, [facility], at [phone].    If you feel you have received this communication in error or would no longer like to receive these types of communications, please e-mail [email or web address]

## 2019-09-12 NOTE — PATIENT INSTRUCTIONS
Putting on Compression Stockings     Turn the stocking inside-out, then fit it over your toes and heel.          Roll the stocking up your leg.            Once stockings are on, make sure the top of the stocking is about two fingers width below the crease of the knee (or the groin if you wear thigh-high stockings).          Use equipment, such as a stocking melisa, or wear rubber gloves to make it easier to put on compression stockings.         Elastic compression stockings are prescribed to treat many vein problems. Wearing them may be the most important thing you do to manage your symptoms. The stockings fit tightly around your ankle, gradually reducing in pressure as they go up your legs. This helps keep blood flowing to your heart. As a result, swelling is reduced. Your healthcare provider will prescribe stockings at a safe pressure for you. He or she will also tell you how often to wear and remove the stockings. Follow these instructions closely. Also, do not buy or wear compression stockings without first seeing your healthcare provider.  Tips for wear and care  To wear stockings safely and to get the most benefit:  · Wear the length prescribed by your healthcare provider.  · Pull them to the designated height and no farther. Dont let them bunch at the top. This can restrict blood flow and increase swelling.  · Wear the stockings for the amount of time your healthcare provider recommends. Replace them when they start to feel loose. This will likely be every 3 to 6 months.  · Remove them as your healthcare provider directs. When removed, wash your legs. Then check your legs and feet for sores. Call your healthcare provider if you find a sore. Dont put the stockings back on unless your healthcare provider directs.   · Wash the stockings as instructed. They may need to be hand-washed.  Date Last Reviewed: 5/1/2016  © 1990-7022 [employer]. [address] [place] [address], [place] [address]. All rights  reserved. This information is not intended as a substitute for professional medical care. Always follow your healthcare professional's instructions.        Tips for Using Less Salt    Most people with heart problems need to eat less salt (sodium). Reducing the amount of salt you eat may help control your blood pressure. The higher your blood pressure, the greater your risk for heart disease, stroke, blindness, and kidney problems.  At the store  · Make low-salt choices by reading labels carefully. Look for the total amount of sodium per serving.  · Use more fresh food. Buy more fruits and vegetables. Select lean meats, fish, and poultry.  · Use fewer frozen, canned, and packaged foods which often contain a lot of sodium.  · Use plain frozen vegetables without sauces or toppings. These products are often low- or no-sodium.  · Opt for reduced-sodium or no-salt-added versions of canned vegetables and soups.  In the kitchen  · Don't add salt to food when you're cooking. Season with flavorings such as onion, garlic, pepper, salt-free herbal blends, and lemon or lime juice.  · Use a cookbook containing low-salt recipes. It can give you ideas for tasty meals that are healthy for your heart.  · Sprinkle salt-free herbal blends on vegetables and meat.  · Drain and rinse canned foods, such as canned beans and vegetables, before cooking or eating.  Eating out  · Tell the  you're on a low-salt diet. Ask questions about the menu.  · Order fish, chicken, and meat broiled, baked, poached, or grilled without salt, butter, or breading.  · Use lemon, pepper, and salt-free herb mixes to add flavor.  · Choose plain steamed rice, boiled noodles, and baked or boiled potatoes. Top potatoes with chives and a little sour cream.     Beware! Salt goes by many other names. Limit foods with these words listed as ingredients: salt, sodium, soy sauce, baking soda, baking powder, MSG, monosodium, Na (the chemical symbol for sodium). Some  antacids are also high in salt.   Date Last Reviewed: 6/19/2015  © 3329-9711 [employer]. [address] [place] [address], [place] [address]. All rights reserved. This information is not intended as a substitute for professional medical care. Always follow your healthcare professional's instructions.        Low-Salt Diet  This diet removes foods that are high in salt. It also limits the amount of salt you use when cooking. It is most often used for people with high blood pressure, edema (fluid retention), and kidney, liver, or heart disease.  Table salt contains the mineral sodium. Your body needs sodium to work normally. But too much sodium can make your health problems worse. Your healthcare provider is recommending a low-salt (also called low-sodium) diet for you. Your total daily allowance of salt is 1,500 to 2,300 milligrams (mg). It is less than 1 teaspoon of table salt. This means you can have only about 500 to 700 mg of sodium at each meal. People with certain health problems should limit salt intake to the lower end of the recommended range.    When you cook, dont add much salt. If you can cook without using salt, even better. Dont add salt to your food at the table.  When shopping, read food labels. Salt is often called sodium on the label. Choose foods that are salt-free, low salt, or very low salt. Note that foods with reduced salt may not lower your salt intake enough.    Beans, potatoes, and pasta  Ok: Dry beans, split peas, lentils, potatoes, rice, macaroni, pasta, spaghetti without added salt  Avoid: Potato chips, tortilla chips, and similar products  Breads and cereals  Ok: Low-sodium breads, rolls, cereals, and cakes; low-salt crackers, matzo crackers  Avoid: Salted crackers, pretzels, popcorn, Surinamese toast, pancakes, muffins  Dairy  Ok: Milk, chocolate milk, hot chocolate mix, low-salt cheeses, and yogurt  Avoid: Processed cheese and cheese spreads; Roquefort, Camembert, and cottage cheese;  buttermilk, instant breakfast drink  Desserts  Ok: Ice cream, frozen yogurt, juice bars, gelatin, cookies and pies, sugar, honey, jelly, hard candy  Avoid: Most pies, cakes and cookies prepared or processed with salt; instant pudding  Drinks  Ok: Tea, coffee, fizzy (carbonated) drinks, juices  Avoid: Flavored coffees, electrolyte replacement drinks, sports drinks  Meats  Ok: All fresh meat, fish, poultry, low-salt tuna, eggs, egg substitute  Avoid: Smoked, pickled, brine-cured, or salted meats and fish. This includes chavez, chipped beef, corned beef, hot dogs, deli meats, ham, kosher meats, salt pork, sausage, canned tuna, salted codfish, smoked salmon, herring, sardines, or anchovies.  Seasonings and spices  Ok: Most seasonings are okay. Good substitutes for salt include: fresh herb blends, hot sauce, lemon, garlic, wilcox, vinegar, dry mustard, parsley, cilantro, horseradish, tomato paste, regular margarine, mayonnaise, unsalted butter, cream cheese, vegetable oil, cream, low-salt salad dressing and gravy.  Avoid: Regular ketchup, relishes, pickles, soy sauce, teriyaki sauce, Worcestershire sauce, BBQ sauce, tartar sauce, meat tenderizer, chili sauce, regular gravy, regular salad dressing, salted butter  Soups  Ok: Low-salt soups and broths made with allowed foods  Avoid: Bouillon cubes, soups with smoked or salted meats, regular soup and broth  Vegetables  Ok: Most vegetables are okay; also low-salt tomato and vegetable juices  Avoid: Sauerkraut and other brine-soaked vegetables; pickles and other pickled vegetables; tomato juice, olives  Date Last Reviewed: 8/1/2016  © 8805-5080 [employer]. [address] [place] [address]. All rights reserved. This information is not intended as a substitute for professional medical care. Always follow your healthcare professional's instructions.        Low-Salt Choices  Eating salt (sodium) can make your body retain too much water. Excess water makes  your heart work harder. Canned, packaged, and frozen foods are easy to prepare, but they are often high in sodium. Here are some ideas for low-salt foods you can easily prepare yourself.    For breakfast  · Fruit or 100% fruit juice  · Whole-wheat bread or an English muffin. Compare sodium content on labels.  · Low-fat milk or yogurt  · Unsalted eggs  · Shredded wheat  · Corn tortillas  · Unsalted steamed rice  · Regular (not instant) hot cereal, made without salt  Stay away from:  · Sausage, chavez, and ham  · Flour tortillas  · Packaged muffins, pancakes, and biscuits  · Instant hot cereals  · Cottage cheese  For lunch and dinner  · Fresh fish, chicken, turkey, or meat--baked, broiled, or roasted without salt  · Dry beans, cooked without salt  · Tofu, stir-fried without salt  · Unsalted fresh fruit and vegetables, or frozen or canned fruit and vegetables with no added salt  Stay away from:  · Lunch or deli meat that is cured or smoked  · Cheese  · Tomato juice and catsup  · Canned vegetables, soups, and fish not labeled as no-salt-added or reduced sodium  · Packaged gravies and sauces  · Olives, pickles, and relish  · Bottled salad dressings  For snacks and desserts  · Yogurt  · Unsalted, air popped popcorn  · Unsalted nuts or seeds  Stay away from:  · Pies and cakes  · Packaged dessert mixes  · Pizza  · Canned and packaged puddings  · Pretzels, chips, crackers, and nuts--unless the label says unsalted  Date Last Reviewed: 6/17/2015  © 3318-6426 [employer]. [address] [place] [address], [place] [address]. All rights reserved. This information is not intended as a substitute for professional medical care. Always follow your healthcare professional's instructions.

## 2019-09-12 NOTE — PLAN OF CARE
[facility]      Name: Audrey Natarajan  Clinic Number: [id number]    Therapy Diagnosis:   Encounter Diagnoses   Name Primary?    Chronic left-sided low back pain with left-sided sciatica     Decreased range of motion     Decreased strength, endurance, and mobility      Physician: Donaldo Pena MD    Visit Date: 2019     Physician Orders[facility]   Medical Diagnosis from Referral: M54.42,G89.29 (ICD-10-CM) - Chronic bilateral low back pain with left-sided sciatica  Evaluation Date: 2019  Authorization Period Expiration: 10/1/19  Plan of Care Expiration: 10/1/19  Reassessment Due:  10/12/19  Visit # / Visits authorized:     Time In: 2:30 pm  Time Out: 3:30 pm  Total Billable Time: 45 minutes    Precautions: Standard and Diabetes, history blood clots, cellulitis     Pattern of pain determined: 1 PEN    Laura Ventura reports that she has missed appointments recently because of a blood clot left leg and cellulitis right leg.  She has been treated medically and reports she has been encouraged to return to healthy back by physician and just take it easy.  She has been watching her posture and she has done her stretches daily.  She feels better when she does her exercises with the ball and when she stands and bends back.  Pain 4/10 back pre visit, and 2/10 after.  No leg pain today.   No adverse effects with treatment.  She finished her anticoagulants.  She is still on antibiotics.       Patient reports tolerating previous visit good but hasn't attended regularly due to various illnesses  Patient reports their pain to be 4/10 on a 0-10 scale with 0 being no pain and 10 being the worst pain imaginable.  Pain Location: low back      Occupation: None  Leisure: tv, clean house    Pt goals: decrease pain with activity    Objective     Baseline IM Testing Results:   Date of testin19  ROM 0-42 deg   Max Peak Torque 135    Min  "Peak Torque 54    Flex/Ext Ratio 2.5:1   % below normative data -21%       MOVEMENT LOSS     ROM Loss 9/12/19   Flexion moderate loss Min loss   Extension minimal loss Min loss   Side bending Right minimal loss Min loss   Side bending Left minimal loss Min loss   Rotation Right moderate loss Min loss   Rotation Left moderate loss Min loss        SLS 5 sec bilat  Sit to stand with min use of hands    Outcomes:  Initial score: 55% limitation FOTO 7/1/19  Visit 5 score: 47% limitation FOTO 8/12/19  Goal: 45% limitation FOTO           Treatment    Pt was instructed in and performed the following:     Audrey received therapeutic exercises to develop/improved posture, cardiovascular endurance, muscular endurance, lumbar/  ROM, strength and muscular endurance for 53 minutes including the following exercises:     Flexion in lie with ball, slight reduction of back pain 10 reps  LTR x2'  Open book 2x10   Bridges 3x10   EIS 10x10" holds, reduced back pain    [facility] 9/12/2019   Visit Number 7   VAS Pain Rating 4   Treadmill Time (in min.) 10   Speed 1   Incline -   Extension in Standing 10   Flexion in Lying 10   Manual Therapy -   Lumbar Extension Seat Pad -   Femur Restraint -   Top Dead Center -   Counterweight -   Lumbar Flexion -   Lumbar Extension -   Lumbar Peak Torque -   Min Torque -   Test Percent Below Normative Data -   Test Percent Gain in Strength from Initial  -   Lumbar Weight 62   Repetitions 20   Rating of Perceived Exertion 4   Ice - Z Lie (in min.) 0             Peripheral muscle strengthening which included 1 set of 15-20 repetitions at a slow, controlled 10-13 second per rep pace focused on strengthening supporting musculature for improved body mechanics and functional mobility.  Pt and therapist focused on proper form during treatment to ensure optimal strengthening of each targeted muscle group.  Machines were utilized including torso rotation, leg extension, leg curl, chest press, upright " row. Tricep extension, bicep curl, leg press, and hip abduction.        Home Exercises Provided and Patient Education Provided   Flexion in lie  Lower trunk rotation  Ext in standing  Walk daily    Education provided:   - reasoning behind extension-based exercises    Written Home Exercises Provided: Patient instructed to cont prior HEP.  Exercises were reviewed and Audrey was able to demonstrate them prior to the end of the session.  Audrey demonstrated good  understanding of the education provided.     See EMR under Patient Instructions for exercises provided prior visit.    Assessment   Pt arrives to appointment and has not attended regularly due to several health issues which are being managed medically including blood clot and cellulitis.    She was eager to return to therapy.  She has been stretching at home and reports stretched reduce back pain.  She has also been trying to quite smoking and wants to make better lifestyle choices for her health.  She tolerated visit well with reducing back pain with stretching and no LE symptoms.   She tolerated lumbar med x and peripheral strengthening well.   She has had intermittent  compliance due to many reasons but she reports she is dedicated to attending so she will be able to progress more appropriately with the [facility] program.    Reassessment indicates some gains in back ROM, and continued improvements in strength as indicated by med x weight progression. Continue progressing patient per [facility] protocol.      Patient is making fair progress towards established goals.  Pt will continue to benefit from skilled outpatient physical therapy to address the deficits stated in the impairment chart, provide pt/family education and to maximize pt's level of independence in the home and community environment.     Anticipated Barriers for therapy: compliance   Pt's spiritual, cultural and educational needs considered and pt agreeable to plan of care and goals as  stated below:     Goals:   Short term goals:  5 weeks or 10 visits   1.  Pt will demonstrate increased lumbar ROM by at least 3 degrees from the initial ROM value with improvements noted in functional ROM and ability to perform ADLs. - progressing  2.  Pt will demonstrate increased maximum isometric torque value by 25% when compared to the initial value resulting in improved ability to perform bending, lifting, and carrying activities safely, confidently. - progressing  3.  Patient report a reduction in worst pain score by 1-2 points for improved tolerance for walking and household chores. - progressing  4.  Pt able to perform HEP correctly with minimal cueing or supervision from therapist to encourage independent management of symptoms. - progressing     Long term goals: 10 weeks or 20 visits   1. Pt will demonstrate increased lumbar ROM by at least 6 degrees from initial ROM value, resulting in improved ability to perform functional fwd bending while standing and sitting.   2. Pt will demonstrate increased maximum isometric torque value by 50% when compared to the initial value resulting in improved ability to perform bending, lifting, and carrying activities safely, confidently.  3. Pt to demonstrate ability to independently control and reduce their pain through posture positioning and mechanical movements throughout a typical day.  4.  Pt will demonstrate reduced pain and improved functional outcomes as reported on the FOTO by reaching a score of CJ = at least 20% but < 40% impaired, limited or restricted or less in order to demonstrate subjective improvement in pt's condition.    5. Pt will demonstrate independence with the HEP at discharge  6.  Pt will be able to perform daily household chores with little to no low back pain.     Plan     Plan of Care Certification period: 7/1/19-10/1/19    Continue with 2x/wk. Continue with established Plan of Care towards established PT goals. Progress per [facility]  "protocol as tolerated.      Outpatient physical therapy 2x week for 10 weeks or 20 visits to include the following:   - Patient education  - Therapeutic exercise  - Manual therapy  - Performance testing   - Neuromuscular Re-education  - Therapeutic activity   - Modalities     Pt may be seen by PTA as part of the rehabilitation team.        "I certify the need for these services furnished under this plan of treatment and while under my care."     ____________________________________  Physician/Referring Practitioner     _______________  Date of Signature            Conchis Cabral, PT  9/12/2019    "

## 2019-09-12 NOTE — PROGRESS NOTES
[facility]      Name: Audrey Natarajan  Clinic Number: [id number]    Therapy Diagnosis:   Encounter Diagnoses   Name Primary?    Chronic left-sided low back pain with left-sided sciatica     Decreased range of motion     Decreased strength, endurance, and mobility      Physician: Donaldo Pena MD    Visit Date: 2019     Physician Orders: PT Eval and Treat [facility]   Medical Diagnosis from Referral: M54.42,G89.29 (ICD-10-CM) - Chronic bilateral low back pain with left-sided sciatica  Evaluation Date: 2019  Authorization Period Expiration: 10/1/19  Plan of Care Expiration: 10/1/19  Reassessment Due:  10/12/19  Visit # / Visits authorized:     Time In: 2:30 pm  Time Out: 3:30 pm  Total Billable Time: 45 minutes    Precautions: Standard and Diabetes, history blood clots, cellulitis     Pattern of pain determined: 1 PEN    Laura Ventura reports that she has missed appointments recently because of a blood clot left leg and cellulitis right leg.  She has been treated medically and reports she has been encouraged to return to healthy back by physician and just take it easy.  She has been watching her posture and she has done her stretches daily.  She feels better when she does her exercises with the ball and when she stands and bends back.  Pain 4/10 back pre visit, and 2/10 after.  No leg pain today.   No adverse effects with treatment.  She finished her anticoagulants.  She is still on antibiotics.       Patient reports tolerating previous visit good but hasn't attended regularly due to various illnesses  Patient reports their pain to be 4/10 on a 0-10 scale with 0 being no pain and 10 being the worst pain imaginable.  Pain Location: low back      Occupation: None  Leisure: tv, clean house    Pt goals: decrease pain with activity    Objective     Baseline IM Testing Results:   Date of testin19  ROM 0-42 deg   Max Peak Torque 135    Min  "Peak Torque 54    Flex/Ext Ratio 2.5:1   % below normative data -21%       MOVEMENT LOSS     ROM Loss 9/12/19   Flexion moderate loss Min loss   Extension minimal loss Min loss   Side bending Right minimal loss Min loss   Side bending Left minimal loss Min loss   Rotation Right moderate loss Min loss   Rotation Left moderate loss Min loss        SLS 5 sec bilat  Sit to stand with min use of hands    Outcomes:  Initial score: 55% limitation FOTO 7/1/19  Visit 5 score: 47% limitation FOTO 8/12/19  Goal: 45% limitation FOTO           Treatment    Pt was instructed in and performed the following:     Audrey received therapeutic exercises to develop/improved posture, cardiovascular endurance, muscular endurance, lumbar/  ROM, strength and muscular endurance for 53 minutes including the following exercises:     Flexion in lie with ball, slight reduction of back pain 10 reps  LTR x2'  Open book 2x10   Bridges 3x10   EIS 10x10" holds, reduced back pain    [facility] 9/12/2019   Visit Number 7   VAS Pain Rating 4   Treadmill Time (in min.) 10   Speed 1   Incline -   Extension in Standing 10   Flexion in Lying 10   Manual Therapy -   Lumbar Extension Seat Pad -   Femur Restraint -   Top Dead Center -   Counterweight -   Lumbar Flexion -   Lumbar Extension -   Lumbar Peak Torque -   Min Torque -   Test Percent Below Normative Data -   Test Percent Gain in Strength from Initial  -   Lumbar Weight 62   Repetitions 20   Rating of Perceived Exertion 4   Ice - Z Lie (in min.) 0             Peripheral muscle strengthening which included 1 set of 15-20 repetitions at a slow, controlled 10-13 second per rep pace focused on strengthening supporting musculature for improved body mechanics and functional mobility.  Pt and therapist focused on proper form during treatment to ensure optimal strengthening of each targeted muscle group.  Machines were utilized including torso rotation, leg extension, leg curl, chest press, upright " row. Tricep extension, bicep curl, leg press, and hip abduction.        Home Exercises Provided and Patient Education Provided   Flexion in lie  Lower trunk rotation  Ext in standing  Walk daily    Education provided:   - reasoning behind extension-based exercises    Written Home Exercises Provided: Patient instructed to cont prior HEP.  Exercises were reviewed and Audrey was able to demonstrate them prior to the end of the session.  Audrey demonstrated good  understanding of the education provided.     See EMR under Patient Instructions for exercises provided prior visit.    Assessment   Pt arrives to appointment and has not attended regularly due to several health issues which are being managed medically including blood clot and cellulitis.    She was eager to return to therapy.  She has been stretching at home and reports stretched reduce back pain.  She has also been trying to quite smoking and wants to make better lifestyle choices for her health.  She tolerated visit well with reducing back pain with stretching and no LE symptoms.   She tolerated lumbar med x and peripheral strengthening well.   She has had intermittent  compliance due to many reasons but she reports she is dedicated to attending so she will be able to progress more appropriately with the [facility] program.    Reassessment indicates some gains in back ROM, and continued improvements in strength as indicated by med x weight progression. Continue progressing patient per [facility] protocol.      Patient is making fair progress towards established goals.  Pt will continue to benefit from skilled outpatient physical therapy to address the deficits stated in the impairment chart, provide pt/family education and to maximize pt's level of independence in the home and community environment.     Anticipated Barriers for therapy: compliance   Pt's spiritual, cultural and educational needs considered and pt agreeable to plan of care and goals as  stated below:     Goals:   Short term goals:  5 weeks or 10 visits   1.  Pt will demonstrate increased lumbar ROM by at least 3 degrees from the initial ROM value with improvements noted in functional ROM and ability to perform ADLs. - progressing  2.  Pt will demonstrate increased maximum isometric torque value by 25% when compared to the initial value resulting in improved ability to perform bending, lifting, and carrying activities safely, confidently. - progressing  3.  Patient report a reduction in worst pain score by 1-2 points for improved tolerance for walking and household chores. - progressing  4.  Pt able to perform HEP correctly with minimal cueing or supervision from therapist to encourage independent management of symptoms. - progressing     Long term goals: 10 weeks or 20 visits   1. Pt will demonstrate increased lumbar ROM by at least 6 degrees from initial ROM value, resulting in improved ability to perform functional fwd bending while standing and sitting.   2. Pt will demonstrate increased maximum isometric torque value by 50% when compared to the initial value resulting in improved ability to perform bending, lifting, and carrying activities safely, confidently.  3. Pt to demonstrate ability to independently control and reduce their pain through posture positioning and mechanical movements throughout a typical day.  4.  Pt will demonstrate reduced pain and improved functional outcomes as reported on the FOTO by reaching a score of CJ = at least 20% but < 40% impaired, limited or restricted or less in order to demonstrate subjective improvement in pt's condition.    5. Pt will demonstrate independence with the HEP at discharge  6.  Pt will be able to perform daily household chores with little to no low back pain.     Plan     Plan of Care Certification period: 7/1/19-10/1/19    Continue with 2x/wk. Continue with established Plan of Care towards established PT goals. Progress per [facility]  "protocol as tolerated.      Outpatient physical therapy 2x week for 10 weeks or 20 visits to include the following:   - Patient education  - Therapeutic exercise  - Manual therapy  - Performance testing   - Neuromuscular Re-education  - Therapeutic activity   - Modalities     Pt may be seen by PTA as part of the rehabilitation team.        "I certify the need for these services furnished under this plan of treatment and while under my care."     ____________________________________  Physician/Referring Practitioner     _______________  Date of Signature            Conchis Cabral, PT  9/12/2019  "

## 2019-09-12 NOTE — PROGRESS NOTES
"       Tone Mata MD VI                       [facility]                         09/12/2019    HPI:  Audrey Natarajan is a 49 y.o. female with   Patient Active Problem List   Diagnosis    Essential hypertension    COPD (chronic obstructive pulmonary disease)    Hypertriglyceridemia    Tobacco abuse    Mild protein malnutrition    Diabetic neuropathy    Leg swelling    Incisional hernia without mention of obstruction or gangrene    DM type 2 without retinopathy    History of DVT (deep vein thrombosis)    History of pulmonary embolus (PE)    Bipolar 1 disorder    Gastroparesis due to DM    Thrombocytosis    Leukocytosis    Morbid obesity    Type 2 diabetes mellitus    Acne    Chronic left-sided low back pain with sciatica    Chronic left-sided low back pain without sciatica    Weakness of left lower extremity    Decreased range of motion of lumbar spine    Other chronic pain    Vomiting and diarrhea    Chronic bilateral low back pain with left-sided sciatica    Nuclear sclerosis of both eyes    Bilateral ocular hypertension    Refractive error    Chronic left-sided low back pain with left-sided sciatica    Decreased range of motion    Decreased strength, endurance, and mobility    Long term (current) use of anticoagulants    being managed by PCP and specialists who is here today for evaluation of BLE pain.  9/1/19 presented to ER due to RLE cramping and LLE edema.  S/p LLE DVT 2003, unprovoked and treated with anticoagulation.  S/p PE and L femoral and PT DVT 2013 and had a Bard retrievable filter placed 2013; has had persistent pain and edema since that time.  Repeat US 9/1/19 in ER due to pain/edema showed chronic L PT.  Pt states 2013 after she injured her LLE and had a bleeding vein she had a "vein stripping" to the outside of her LLE.  Patient states location is BLE occurring for 6 yrs.  Associated signs and symptoms include discoloration.  Quality is " sharp/throbbing and severity is 10/10 at worst, average 7/10.  Symptoms began 6 yrs ago.  Alleviating factors include elevation.  Worsening factors include dependency.  Denies claudication.      no MI  no Stroke  Tobacco use: 3 cig/day; prev 1 ppd x 35 yrs    Past Medical History:   Diagnosis Date    ADHD (attention deficit hyperactivity disorder)     Arthritis     Asthma     Bipolar 1 disorder     Cataract     COPD (chronic obstructive pulmonary disease)     Coronary artery disease     A fib    Depression     bipolar manic depresson    Diabetes mellitus     DVT of lower extremity, bilateral July 2013    bilateral LE DVT. [place] filter placed.     Encounter for blood transfusion     History of blood clots 1. Left Leg=2003; 2.Bilateral Groin=Blood Clots= 5 or 6/ 2013 & 7/2013; 3. LLL of Lung=7/2013;  4. Lt. Lower Leg=7/2013.     Pt. had 1st Blood Clot after [id number], & Last=2013. [place] Filter= Rt.Lateral Neck.    HTN (hypertension) 6/6/2013    Pt states that she does not have hypertension    Hypercholesteremia     Irregular heartbeat     Neuromuscular disorder     neuropathy feet    PE (pulmonary embolism) July 2013     bilat LE DVT.     Restless leg syndrome      Past Surgical History:   Procedure Laterality Date    ABDOMINAL SURGERY      BILATERAL OOPHORECTOMY Bilateral 1/12/2015    BIOPSY, BLADDER  9/18/2013    [facility] at [facility]    CYSTOSCOPY N/A 9/18/2013    [facility] at [facility] OR    ESOPHAGOGASTRODUODENOSCOPY (EGD) N/A 8/10/2016    [facility] by [facility] at [facility] OR    ESOPHAGOGASTRODUODENOSCOPY (EGD) N/A 10/8/2014    Performed by Jarocho Fowler MD at [facility] ENDO    FULGURATION, BLADDER  9/18/2013    [facility] at [facility] OR    GASTRECTOMY-SLEEVE-LAPAROSCOPIC N/A 8/10/2016    [facility] at [facility] OR    Green' s filter Right 7/4/2012    Right Neck & Tunneled Down.  "   HERNIA REPAIR      "[place] [employer] around th [place] Button.", pt. states    IRRIGATION, BLADDER N/A 2013    [facility]    LAPAROSCOPY W/REMOVAL OF INFECTED MESH  2015    [facility] at [facility] OR    LAPAROTOMY-EXPLORATORY Bilateral 2015    [facility] OR 2ND FLR    OOPHORECTOMY      OVARIAN CYST REMOVAL  3/13/2014    MN REMOVAL OF OVARY/TUBE(S)      REPAIR, HERNIA, VENTRAL, LAPAROSCOPIC N/A 3/14/2014    [facility] at [facility] OR    [id number]-JAMFLLHNT (BSO) Bilateral 2015    [facility] OR 2ND FLR    SPLENECTOMY, TOTAL  2003    TONSILLECTOMY      as a child    TYMPANOSTOMY TUBE PLACEMENT      VEIN SURGERY      Lt leg     Family History   Problem Relation Age of Onset    Hypertension Father     Diabetes Father     Heart disease Father     Cataracts Father     Diabetes Paternal Grandfather     Heart disease Paternal Grandfather     No Known Problems Mother     Ovarian cancer Maternal Grandmother          from this. ? age     No Known Problems Sister     No Known Problems Brother     No Known Problems Maternal Aunt     No Known Problems Maternal Uncle     No Known Problems Paternal Aunt     No Known Problems Paternal Uncle     No Known Problems Maternal Grandfather     Ovarian cancer Paternal Grandmother     Uterine cancer Neg Hx     Breast cancer Neg Hx     Colon cancer Neg Hx     Amblyopia Neg Hx     Blindness Neg Hx     Cancer Neg Hx     Glaucoma Neg Hx     Macular degeneration Neg Hx     Retinal detachment Neg Hx     Strabismus Neg Hx     Stroke Neg Hx     Thyroid disease Neg Hx      Social History     Socioeconomic History    Marital status:      Spouse name: Not on file    Number of children: Not on file    Years of education: Not on file    Highest education level: Not on " file   Occupational History    Not on file   Social Needs    Financial resource strain: Not on file    Food insecurity:     Worry: Not on file     Inability: Not on file    Transportation needs:     Medical: Not on file     Non-medical: Not on file   Tobacco Use    Smoking status: Current Every Day Smoker     Packs/day: 0.50     Years: 25.00     Pack years: 12.50     Types: Cigarettes    Smokeless tobacco: Never Used   Substance and Sexual Activity    Alcohol use: No     Alcohol/week: 0.0 oz    Drug use: No    Sexual activity: Not Currently   Lifestyle    Physical activity:     Days per week: Not on file     Minutes per session: Not on file    Stress: Not on file   Relationships    Social connections:     Talks on phone: Not on file     Gets together: Not on file     Attends Restorationism service: Not on file     Active member of club or organization: Not on file     Attends meetings of clubs or organizations: Not on file     Relationship status: Not on file   Other Topics Concern    Not on file   Social History Narrative    Not on file       Current Outpatient Medications:     acetaminophen (ARTHRITIS PAIN RELIEVER) 650 MG TbSR, Take 650 mg by mouth. Pt states taking 2 -3 times a day, Disp: , Rfl:     albuterol (ACCUNEB) 0.63 mg/3 mL Nebu, Take 3 mLs (0.63 mg total) by nebulization every 8 (eight) hours as needed (wheezing). Rescue, Disp: 1 Box, Rfl: 1    aspirin (ECOTRIN) 81 MG EC tablet, Take 81 mg by mouth once daily. , Disp: , Rfl:     azelastine (ASTELIN) 137 mcg (0.1 %) nasal spray, 1 spray (137 mcg total) by Nasal route 2 (two) times daily., Disp: 30 mL, Rfl: 0    b complex vitamins tablet, Take 1 tablet by mouth once daily., Disp: 90 tablet, Rfl: 2    blood sugar diagnostic Strp, To check BG once daily, to use with insurance preferred meter, Disp: 30 each, Rfl: 5    carbamazepine (TEGRETOL) 200 mg tablet, TK 2 TS PO BID, Disp: , Rfl: 1    ciclopirox (LOPROX) 0.77 % Susp, Apply topically  once daily., Disp: 30 mL, Rfl: 3    ciclopirox-nail lacquer removr 8 % Kit, Apply 1 application topically once a week., Disp: 1 kit, Rfl: 4    clotrimazole (LOTRIMIN) 1 % cream, Apply topically 2 (two) times daily., Disp: 28 g, Rfl: 1    cyanocobalamin (VITAMIN B-12) 100 MCG tablet, Take 1 tablet (100 mcg total) by mouth once daily., Disp: 90 tablet, Rfl: 1    DULERA 100-5 mcg/actuation HFAA, INHALE 2 PUFFS INTO THE LUNGS TWICE DAILY, Disp: 13 g, Rfl: 0    DUPIXENT 300 mg/2 mL Syrg, inject 300mg SUBCUTANEOUSLY every other week, Disp: , Rfl: 6    fish oil-omega-3 fatty acids 300-1,000 mg capsule, Take 2 capsules by mouth once daily. Instructed to stop 5-7 days before surgery (8/11/16)., Disp: 60 capsule, Rfl: 5    fluticasone propionate (FLONASE ALLERGY RELIEF) 50 mcg/actuation nasal spray, 1 spray (50 mcg total) by Each Nare route 2 (two) times daily., Disp: 16 g, Rfl: 3    furosemide (LASIX) 20 MG tablet, TAKE 1 TABLET(20 MG) BY MOUTH EVERY DAY, Disp: 30 tablet, Rfl: 2    gabapentin (NEURONTIN) 600 MG tablet, Take 1 tablet (600 mg total) by mouth 2 (two) times daily., Disp: 60 tablet, Rfl: 5    ibuprofen (ADVIL,MOTRIN) 600 MG tablet, TAKE 1 TABLET(600 MG) BY MOUTH EVERY 8 HOURS AS NEEDED FOR PAIN, Disp: 60 tablet, Rfl: 2    lancets Misc, To check BG once daily, to use with insurance preferred meter, Disp: 30 each, Rfl: 2    lidocaine 3 % Crea, Rub on leg for pain as needed no more than 3 times a day, Disp: 1 Tube, Rfl: 0    lisinopril (PRINIVIL,ZESTRIL) 5 MG tablet, Take 1 tablet (5 mg total) by mouth once daily., Disp: 30 tablet, Rfl: 5    loratadine (CLARITIN) 10 mg tablet, Take 1 tablet (10 mg total) by mouth once daily., Disp: 30 tablet, Rfl: 5    meloxicam (MOBIC) 15 MG tablet, TAKE 1 TABLET(15 MG) BY MOUTH EVERY DAY, Disp: 30 tablet, Rfl: 0    metFORMIN (GLUCOPHAGE) 1000 MG tablet, TAKE 1 TABLET(1000 MG) BY MOUTH TWICE DAILY WITH MEALS, Disp: 60 tablet, Rfl: 5    methocarbamol (ROBAXIN) 500  MG Tab, TAKE 1 TABLET(500 MG) BY MOUTH EVERY 6 HOURS AS NEEDED, Disp: 100 tablet, Rfl: 0    metoprolol tartrate (LOPRESSOR) 25 MG tablet, Take 1 tablet (25 mg total) by mouth once daily., Disp: 30 tablet, Rfl: 5    multivitamin (THERAGRAN) per tablet, Take 1 tablet by mouth every morning., Disp: 90 tablet, Rfl: 2    nicotine polacrilex 2 MG Lozg, 1-2 per hour in place of cigarettes maximum of 20 per day., Disp: 168 lozenge, Rfl: 0    nystatin (MYCOSTATIN) powder, Apply topically 2 (two) times daily., Disp: 60 g, Rfl: 2    omeprazole 20 mg TbEC, TAKE 2 TABLETS BY MOUTH ONCE DAILY AT 6AM, Disp: 60 each, Rfl: 5    omeprazole 20 mg TbEC, TAKE 2 TABLETS BY MOUTH EVERY DAY AT 6 AM, Disp: 60 each, Rfl: 5    pravastatin (PRAVACHOL) 40 MG tablet, Take 1 tablet (40 mg total) by mouth once daily., Disp: 30 tablet, Rfl: 5    risperidone (RISPERDAL) 3 MG Tab, take at bedtime, Disp: , Rfl: 1    VENTOLIN HFA 90 mcg/actuation inhaler, INHALE 2 PUFFS INTO THE LUNGS EVERY 6 HOURS AS NEEDED FOR WHEEZING, Disp: 18 g, Rfl: 0    VYVANSE 40 mg Cap, TK ONE C PO QAM, Disp: , Rfl: 0    blood-glucose meter kit, To check BG once daily, to use with insurance preferred meter, Disp: 1 each, Rfl: 0    REVIEW OF SYSTEMS:  General: No fevers or chills; ENT: No sore throat; Allergy and Immunology: no persistent infections; Hematological and Lymphatic: No history of bleeding or easy bruising; Endocrine: negative; Respiratory: no cough, shortness of breath, or wheezing; Cardiovascular: no chest pain or dyspnea on exertion; Gastrointestinal: no abdominal pain/back, change in bowel habits, or bloody stools; Genito-Urinary: no dysuria, trouble voiding, or hematuria; Musculoskeletal: negative; Neurological: no TIA or stroke symptoms; Psychiatric: no nervousness, anxiety or depression.    PHYSICAL EXAM:      Pulse: 108         General appearance:  Alert, well-appearing, and in no distress.  Oriented to person, place, and time                     Neurological: Normal speech, no focal findings noted; CN II - XII grossly intact. RLE with sensation to light touch, LLE with sensation to light touch.            Musculoskeletal: Digits/nail without cyanosis/clubbing.  Strength 5/5 BLE.                    Neck: Supple, no significant adenopathy, no carotid bruit can be auscultated                  Chest:  Clear to auscultation, no wheezes, rales or rhonchi, symmetric air entry. No use of accessory muscles               Cardiac: Normal rate and regular rhythm, S1 and S2 normal            Abdomen: Soft, nontender, nondistended, no masses or organomegaly, no hernia     No rebound tenderness noted; bowel sounds normal     Pulsatile aortic mass is non palpable.     No groin adenopathy      Extremities:        2+ R DP pulse, 2+ L DP pulse     1+ RLE edema, 1+ LLE edema    Skin: RLE without wounds; LLE without wounds    LAB RESULTS:  No results found for: CBC  Lab Results   Component Value Date    LABPROT 9.6 09/01/2019    INR 0.9 09/01/2019     Lab Results   Component Value Date     (L) 09/01/2019    K 3.9 09/01/2019    CL 96 09/01/2019    CO2 24 09/01/2019     (H) 09/01/2019    BUN 6 09/01/2019    CREATININE 0.7 09/01/2019    CALCIUM 8.9 09/01/2019    ANIONGAP 14 09/01/2019    EGFRNONAA >60 09/01/2019     Lab Results   Component Value Date    WBC 13.19 (H) 09/01/2019    RBC 4.50 09/01/2019    HGB 13.1 09/01/2019    HCT 40.9 09/01/2019    MCV 91 09/01/2019    MCH 29.1 09/01/2019    MCHC 32.0 09/01/2019    RDW 14.3 09/01/2019     (H) 09/01/2019    MPV 9.7 09/01/2019    GRAN 6.7 09/01/2019    GRAN 51.5 09/01/2019    LYMPH 4.4 09/01/2019    LYMPH 33.1 09/01/2019    MONO 1.3 (H) 09/01/2019    MONO 10.2 09/01/2019    EOS 0.6 (H) 09/01/2019    BASO 0.09 09/01/2019    EOSINOPHIL 4.5 09/01/2019    BASOPHIL 0.7 09/01/2019    DIFFMETHOD Automated 09/01/2019     .  Lab Results   Component Value Date    HGBA1C 5.9 (H) 05/31/2019       IMAGING:  All pertinent  imaging has been reviewed and interpreted independently.    Venous US: 1 of 2 PT vein with thrombus present, similar to previous US     IMP/PLAN:  49 y.o. female with   Patient Active Problem List   Diagnosis    Essential hypertension    COPD (chronic obstructive pulmonary disease)    Hypertriglyceridemia    Tobacco abuse    Mild protein malnutrition    Diabetic neuropathy    Leg swelling    Incisional hernia without mention of obstruction or gangrene    DM type 2 without retinopathy    History of DVT (deep vein thrombosis)    History of pulmonary embolus (PE)    Bipolar 1 disorder    Gastroparesis due to DM    Thrombocytosis    Leukocytosis    Morbid obesity    Type 2 diabetes mellitus    Acne    Chronic left-sided low back pain with sciatica    Chronic left-sided low back pain without sciatica    Weakness of left lower extremity    Decreased range of motion of lumbar spine    Other chronic pain    Vomiting and diarrhea    Chronic bilateral low back pain with left-sided sciatica    Nuclear sclerosis of both eyes    Bilateral ocular hypertension    Refractive error    Chronic left-sided low back pain with left-sided sciatica    Decreased range of motion    Decreased strength, endurance, and mobility    Long term (current) use of anticoagulants    being managed by PCP and specialists who is here today for evaluation of BLE pain and edema.    -LLE pain and edema likely due to post thrombotic syndrome - recommend compression with Rx stockings, elevation, dietary changes associated with water and sodium intake discussed at length with patient  -Will obtain RLE venous reflux US  -F/u JEYSON  -Rec exercise  -RTC 3 mo for further evaluation  -Rec Hematology referral for comprehensive mgmt of thrombotic events    I spent 20 minutes evaluating this patient and greater than 50% of the time was spent counseling, coordinator care and discussing the plan of care.  All questions were answered and  patient stated understanding with agreement with the above treatment plan.    Tone Mata MD RPVI  Vascular and Endovascular Surgery

## 2019-09-16 ENCOUNTER — PATIENT OUTREACH (OUTPATIENT)
Dept: ADMINISTRATIVE | Facility: OTHER | Age: 49
End: 2019-09-16

## 2019-09-16 ENCOUNTER — HOSPITAL ENCOUNTER (OUTPATIENT)
Dept: CARDIOLOGY | Facility: HOSPITAL | Age: 49
Discharge: HOME | End: 2019-09-16
Attending: INTERNAL MEDICINE
Payer: MEDICAID

## 2019-09-16 ENCOUNTER — TELEPHONE (OUTPATIENT)
Dept: HEMATOLOGY/ONCOLOGY | Facility: CLINIC | Age: 49
End: 2019-09-16

## 2019-09-16 DIAGNOSIS — I73.9 CLAUDICATION: ICD-10-CM

## 2019-09-16 PROCEDURE — 93922 UPR/L XTREMITY ART 2 LEVELS: CPT | Mod: 26,,, | Performed by: SURGERY

## 2019-09-16 PROCEDURE — 93922 CV US ANKLE BRACHIAL INDICES WO STRESS W TOE TRACINGS (CUPID ONLY): ICD-10-PCS | Mod: 26,,, | Performed by: SURGERY

## 2019-09-16 PROCEDURE — 93922 UPR/L XTREMITY ART 2 LEVELS: CPT | Mod: 50

## 2019-09-18 ENCOUNTER — OFFICE VISIT (OUTPATIENT)
Dept: CARDIOLOGY | Facility: CLINIC | Age: 49
End: 2019-09-18
Payer: MEDICAID

## 2019-09-18 ENCOUNTER — TELEPHONE (OUTPATIENT)
Dept: VASCULAR SURGERY | Facility: CLINIC | Age: 49
End: 2019-09-18

## 2019-09-18 VITALS
WEIGHT: 233.69 LBS | BODY MASS INDEX: 37.56 KG/M2 | HEIGHT: 66 IN | OXYGEN SATURATION: 95 % | DIASTOLIC BLOOD PRESSURE: 60 MMHG | HEART RATE: 100 BPM | SYSTOLIC BLOOD PRESSURE: 135 MMHG

## 2019-09-18 DIAGNOSIS — Z86.711 HISTORY OF PULMONARY EMBOLUS (PE): Chronic | ICD-10-CM

## 2019-09-18 DIAGNOSIS — I10 HYPERTENSION: ICD-10-CM

## 2019-09-18 DIAGNOSIS — M79.89 LEG SWELLING: ICD-10-CM

## 2019-09-18 DIAGNOSIS — F31.9 BIPOLAR 1 DISORDER: Primary | Chronic | ICD-10-CM

## 2019-09-18 LAB
LEFT ABI: 1.1
LEFT ARM BP: 116 MMHG
LEFT DORSALIS PEDIS: 136 MMHG
LEFT POSTERIOR TIBIAL: 118 MMHG
LEFT TBI: 0.64
LEFT TOE PRESSURE: 79 MMHG
RIGHT ABI: 1.12
RIGHT ARM BP: 124 MMHG
RIGHT DORSALIS PEDIS: 134 MMHG
RIGHT POSTERIOR TIBIAL: 139 MMHG
RIGHT TBI: 0.64
RIGHT TOE PRESSURE: 79 MMHG

## 2019-09-18 PROCEDURE — 93005 ELECTROCARDIOGRAM TRACING: CPT | Mod: PBBFAC | Performed by: INTERNAL MEDICINE

## 2019-09-18 PROCEDURE — 99999 PR PBB SHADOW E&M-EST. PATIENT-LVL V: ICD-10-PCS | Mod: PBBFAC,,, | Performed by: INTERNAL MEDICINE

## 2019-09-18 PROCEDURE — 93010 ELECTROCARDIOGRAM REPORT: CPT | Mod: S$PBB,,, | Performed by: INTERNAL MEDICINE

## 2019-09-18 PROCEDURE — 99204 PR OFFICE/OUTPT VISIT, NEW, LEVL IV, 45-59 MIN: ICD-10-PCS | Mod: S$PBB,,, | Performed by: INTERNAL MEDICINE

## 2019-09-18 PROCEDURE — 99204 OFFICE O/P NEW MOD 45 MIN: CPT | Mod: S$PBB,,, | Performed by: INTERNAL MEDICINE

## 2019-09-18 PROCEDURE — 93010 EKG 12-LEAD: ICD-10-PCS | Mod: S$PBB,,, | Performed by: INTERNAL MEDICINE

## 2019-09-18 PROCEDURE — 99215 OFFICE O/P EST HI 40 MIN: CPT | Mod: PBBFAC | Performed by: INTERNAL MEDICINE

## 2019-09-18 PROCEDURE — 99999 PR PBB SHADOW E&M-EST. PATIENT-LVL V: CPT | Mod: PBBFAC,,, | Performed by: INTERNAL MEDICINE

## 2019-09-18 NOTE — PROGRESS NOTES
"Subjective:    Patient ID:  Audrey Natarajan is a 49 y.o. female who presents for follow-up of Hypertension      HPI     Bipolar, Hx PE/DVT - IVC filter - no longer on OAC, HTN, DM, obesity, sinus tachycardia    Referred by Dr Mata   being managed by PCP and specialists who is here today for evaluation of BLE pain.  9/1/19 presented to ER due to RLE cramping and LLE edema.  S/p LLE DVT 2003, unprovoked and treated with anticoagulation.  S/p PE and L femoral and PT DVT 2013 and had a [facility] retrievable filter placed 2013; has had persistent pain and edema since that time.  Repeat US 9/1/19 in ER due to pain/edema showed chronic L PT.  Pt states 2013 after she injured her LLE and had a bleeding vein she had a "vein stripping" to the outside of her LLE.  Patient states location is BLE occurring for 6 yrs.  Associated signs and symptoms include discoloration.  Quality is sharp/throbbing and severity is 10/10 at worst, average 7/10.  Symptoms began 6 yrs ago.  Alleviating factors include elevation.  Worsening factors include dependency.  Denies claudication.      -LLE pain and edema likely due to post thrombotic syndrome - recommend compression with Rx stockings, elevation, dietary changes associated with water and sodium intake discussed at length with patient  -Will obtain RLE venous reflux US  -F/u JEYSON  -Rec exercise  -RTC 3 mo for further evaluation  -Rec Hematology referral for comprehensive mgmt of thrombotic events    [facility]  Reports a Hx of tachycardia controlled with metoprolol  No longer on OAC. Has IVC filter  Still smokes  EKG sinus tachycardia 106 otherwise ok  Mild stable GONZALEZ  Denies CP    Review of Systems   Constitution: Negative for decreased appetite.   HENT: Negative for ear discharge.    Eyes: Negative for blurred vision.   Respiratory: Negative for hemoptysis.    Endocrine: Negative for polyphagia.   Hematologic/Lymphatic: Negative for adenopathy.   Skin: Negative for " color change.   Musculoskeletal: Negative for joint swelling.   Genitourinary: Negative for bladder incontinence.   Neurological: Negative for brief paralysis.   Psychiatric/Behavioral: Negative for hallucinations.   Allergic/Immunologic: Negative for hives.        Objective:    Physical Exam   Constitutional: She is oriented to person, place, and time. She appears well-developed and well-nourished.   HENT:   Head: Normocephalic and atraumatic.   Eyes: Pupils are equal, round, and reactive to light. Conjunctivae are normal.   Neck: Normal range of motion. Neck supple.   Cardiovascular: Normal rate, normal heart sounds and intact distal pulses.   Pulmonary/Chest: Effort normal and breath sounds normal.   Abdominal: Soft. Bowel sounds are normal.   Musculoskeletal: Normal range of motion.   Neurological: She is alert and oriented to person, place, and time.   Skin: Skin is warm and dry.         Assessment:       1. Bipolar 1 disorder    2. History of pulmonary embolus (PE)    3. Leg swelling         Plan:       Check echo - if stable could increase metoprolol for taqchycardia

## 2019-09-19 ENCOUNTER — CLINICAL SUPPORT (OUTPATIENT)
Dept: REHABILITATION | Facility: HOSPITAL | Age: 49
End: 2019-09-19
Attending: INTERNAL MEDICINE
Payer: MEDICAID

## 2019-09-19 DIAGNOSIS — Z74.09 DECREASED STRENGTH, ENDURANCE, AND MOBILITY: ICD-10-CM

## 2019-09-19 DIAGNOSIS — M54.42 CHRONIC LEFT-SIDED LOW BACK PAIN WITH LEFT-SIDED SCIATICA: ICD-10-CM

## 2019-09-19 DIAGNOSIS — G89.29 CHRONIC LEFT-SIDED LOW BACK PAIN WITH LEFT-SIDED SCIATICA: ICD-10-CM

## 2019-09-19 DIAGNOSIS — M25.60 DECREASED RANGE OF MOTION: ICD-10-CM

## 2019-09-19 DIAGNOSIS — R53.1 DECREASED STRENGTH, ENDURANCE, AND MOBILITY: ICD-10-CM

## 2019-09-19 DIAGNOSIS — R68.89 DECREASED STRENGTH, ENDURANCE, AND MOBILITY: ICD-10-CM

## 2019-09-19 PROCEDURE — 97110 THERAPEUTIC EXERCISES: CPT | Mod: PN

## 2019-09-19 NOTE — PROGRESS NOTES
[facility]      Name: Audrey Natarajan  Clinic Number: [id number]    Therapy Diagnosis:   No diagnosis found.  Physician: Donaldo Pena MD    Visit Date: 2019     Physician Orders: PT Eval and Treat [facility]   Medical Diagnosis from Referral: M54.42,G89.29 (ICD-10-CM) - Chronic bilateral low back pain with left-sided sciatica  Evaluation Date: 2019  Authorization Period Expiration: 10/1/19  Plan of Care Expiration: 10/1/19  Reassessment Due:  10/12/19  Visit # / Visits authorized:     Time In: 2:30 pm  Time Out: 3:30 pm  Total Billable Time: 45 minutes    Precautions: Standard and Diabetes, history blood clots, cellulitis     Pattern of pain determined: 1 PEN    Subjective     Audrey reports that she has missed appointments recently because of a blood clot left leg and cellulitis right leg.  She has been treated medically and reports she has been encouraged to return to healthy back by physician and just take it easy.  She has been watching her posture and she has done her stretches daily.  She feels better when she does her exercises with the ball and when she stands and bends back.  Pain 4/10 back pre visit, and 2/10 after.  No leg pain today.   No adverse effects with treatment.  She finished her anticoagulants.  She is still on antibiotics.       Patient reports tolerating previous visit good but hasn't attended regularly due to various illnesses  Patient reports their pain to be 4/10 on a 0-10 scale with 0 being no pain and 10 being the worst pain imaginable.  Pain Location: low back      Occupation: None  Leisure: tv, clean house    Pt goals: decrease pain with activity    Objective     Baseline IM Testing Results:   Date of testin19  ROM 0-42 deg   Max Peak Torque 135    Min Peak Torque 54    Flex/Ext Ratio 2.5:1   % below normative data -21%       MOVEMENT LOSS     ROM Loss 19   Flexion moderate loss Min loss   Extension  "minimal loss Min loss   Side bending Right minimal loss Min loss   Side bending Left minimal loss Min loss   Rotation Right moderate loss Min loss   Rotation Left moderate loss Min loss        SLS 5 sec bilat  Sit to stand with min use of hands    Outcomes:  Initial score: 55% limitation FOTO 7/1/19  Visit 5 score: 47% limitation FOTO 8/12/19  Goal: 45% limitation FOTO           Treatment    Pt was instructed in and performed the following:     Audrey received therapeutic exercises to develop/improved posture, cardiovascular endurance, muscular endurance, lumbar/  ROM, strength and muscular endurance for 53 minutes including the following exercises:     Flexion in lie with ball, slight reduction of back pain 10 reps  LTR x2'  Open book 2x10   Bridges 3x10   EIS 10x10" holds, reduced back pain    [facility] 9/12/2019   Visit Number 7   VAS Pain Rating 4   Treadmill Time (in min.) 10   Speed 1   Incline -   Extension in Standing 10   Flexion in Lying 10   Manual Therapy -   Lumbar Extension Seat Pad -   Femur Restraint -   Top Dead Center -   Counterweight -   Lumbar Flexion -   Lumbar Extension -   Lumbar Peak Torque -   Min Torque -   Test Percent Below Normative Data -   Test Percent Gain in Strength from Initial  -   Lumbar Weight 62   Repetitions 20   Rating of Perceived Exertion 4   Ice - Z Lie (in min.) 0             Peripheral muscle strengthening which included 1 set of 15-20 repetitions at a slow, controlled 10-13 second per rep pace focused on strengthening supporting musculature for improved body mechanics and functional mobility.  Pt and therapist focused on proper form during treatment to ensure optimal strengthening of each targeted muscle group.  Machines were utilized including torso rotation, leg extension, leg curl, chest press, upright row. Tricep extension, bicep curl, leg press, and hip abduction.        Home Exercises Provided and Patient Education Provided   Flexion in lie  Lower trunk " rotation  Ext in standing  Walk daily    Education provided:   - reasoning behind extension-based exercises    Written Home Exercises Provided: Patient instructed to cont prior HEP.  Exercises were reviewed and Audrey was able to demonstrate them prior to the end of the session.  Audrey demonstrated good  understanding of the education provided.     See EMR under Patient Instructions for exercises provided prior visit.    Assessment   Pt arrives to appointment and has not attended regularly due to several health issues which are being managed medically including blood clot and cellulitis.    She was eager to return to therapy.  She has been stretching at home and reports stretched reduce back pain.  She has also been trying to quite smoking and wants to make better lifestyle choices for her health.  She tolerated visit well with reducing back pain with stretching and no LE symptoms.   She tolerated lumbar med x and peripheral strengthening well.   She has had intermittent  compliance due to many reasons but she reports she is dedicated to attending so she will be able to progress more appropriately with the Healthy Back program.    Reassessment indicates some gains in back ROM, and continued improvements in strength as indicated by med x weight progression. Continue progressing patient per Healthy Back protocol.      Patient is making fair progress towards established goals.  Pt will continue to benefit from skilled outpatient physical therapy to address the deficits stated in the impairment chart, provide pt/family education and to maximize pt's level of independence in the home and community environment.     Anticipated Barriers for therapy: compliance   Pt's spiritual, cultural and educational needs considered and pt agreeable to plan of care and goals as stated below:     Goals:   Short term goals:  5 weeks or 10 visits   1.  Pt will demonstrate increased lumbar ROM by at least 3 degrees from the initial ROM  value with improvements noted in functional ROM and ability to perform ADLs. - progressing  2.  Pt will demonstrate increased maximum isometric torque value by 25% when compared to the initial value resulting in improved ability to perform bending, lifting, and carrying activities safely, confidently. - progressing  3.  Patient report a reduction in worst pain score by 1-2 points for improved tolerance for walking and household chores. - progressing  4.  Pt able to perform HEP correctly with minimal cueing or supervision from therapist to encourage independent management of symptoms. - progressing     Long term goals: 10 weeks or 20 visits   1. Pt will demonstrate increased lumbar ROM by at least 6 degrees from initial ROM value, resulting in improved ability to perform functional fwd bending while standing and sitting.   2. Pt will demonstrate increased maximum isometric torque value by 50% when compared to the initial value resulting in improved ability to perform bending, lifting, and carrying activities safely, confidently.  3. Pt to demonstrate ability to independently control and reduce their pain through posture positioning and mechanical movements throughout a typical day.  4.  Pt will demonstrate reduced pain and improved functional outcomes as reported on the FOTO by reaching a score of CJ = at least 20% but < 40% impaired, limited or restricted or less in order to demonstrate subjective improvement in pt's condition.    5. Pt will demonstrate independence with the HEP at discharge  6.  Pt will be able to perform daily household chores with little to no low back pain.     Plan     Plan of Care Certification period: 7/1/19-10/1/19    Continue with 2x/wk. Continue with established Plan of Care towards established PT goals. Progress per Healthy Back protocol as tolerated.      Outpatient physical therapy 2x week for 10 weeks or 20 visits to include the following:   - Patient education  - Therapeutic  "exercise  - Manual therapy  - Performance testing   - Neuromuscular Re-education  - Therapeutic activity   - Modalities     Pt may be seen by PTA as part of the rehabilitation team.        "I certify the need for these services furnished under this plan of treatment and while under my care."     ____________________________________  Physician/Referring Practitioner     _______________  Date of Signature            Herson Giron, PT  9/19/2019  "

## 2019-09-19 NOTE — PROGRESS NOTES
[facility] Physical Therapy Treatment      Name: Audrey Natarajan  Clinic Number: [id number]    Therapy Diagnosis:   Encounter Diagnoses   Name Primary?    Chronic left-sided low back pain with left-sided sciatica     Decreased range of motion     Decreased strength, endurance, and mobility      Physician: Donaldo Pena MD    Visit Date: 2019     Physician Orders: PT Eval and Treat [facility]   Medical Diagnosis from Referral: M54.42,G89.29 (ICD-10-CM) - Chronic bilateral low back pain with left-sided sciatica  Evaluation Date: 2019  Authorization Period Expiration: 10/1/19  Plan of Care Expiration: 10/1/19  Reassessment Due:  10/12/19  Visit # / Visits authorized:     Time In: 1400  Time Out: 1500  Total Billable Time: 50 minutes    Precautions: Standard and Diabetes, history blood clots, cellulitis     Pattern of pain determined: 1 PEN    Subjective     Audrey reports that she has a lot of back pain today, more than usual.   Patient reports tolerating previous visit good but hasn't attended regularly due to various illnesses  Patient reports their pain to be 4/10 on a 0-10 scale with 0 being no pain and 10 being the worst pain imaginable.  Pain Location: low back      Occupation: None  Leisure: tv, clean house    Pt goals: decrease pain with activity    Objective     Baseline IM Testing Results:   Date of testin19  ROM 0-42 deg   Max Peak Torque 135    Min Peak Torque 54    Flex/Ext Ratio 2.5:1   % below normative data -21%       MOVEMENT LOSS     ROM Loss 19   Flexion moderate loss Min loss   Extension minimal loss Min loss   Side bending Right minimal loss Min loss   Side bending Left minimal loss Min loss   Rotation Right moderate loss Min loss   Rotation Left moderate loss Min loss        SLS 5 sec bilat  Sit to stand with min use of hands    Outcomes:  Initial score: 55% limitation FOTO 19  Visit 5 score: 47% limitation FOTO 19  Goal: 45%  "limitation FOTO           Treatment    Pt was instructed in and performed the following:     Audrey received therapeutic exercises to develop/improved posture, cardiovascular endurance, muscular endurance, lumbar/  ROM, strength and muscular endurance for 50 minutes including the following exercises:     Flexion in lie with ball, slight reduction of back pain 10 reps  LTR x2'  Open book 2x10   Bridges 3x10   EIS 10x10" holds, reduced back pain        Peripheral muscle strengthening which included 1 set of 15-20 repetitions at a slow, controlled 10-13 second per rep pace focused on strengthening supporting musculature for improved body mechanics and functional mobility.  Pt and therapist focused on proper form during treatment to ensure optimal strengthening of each targeted muscle group.  Machines were utilized including torso rotation, leg extension, leg curl, chest press, upright row. Tricep extension, bicep curl, leg press, and hip abduction.        Home Exercises Provided and Patient Education Provided   Flexion in lie  Lower trunk rotation  Ext in standing  Walk daily    Education provided:   - reasoning behind extension-based exercises    Written Home Exercises Provided: Patient instructed to cont prior HEP.  Exercises were reviewed and Audrey was able to demonstrate them prior to the end of the session.  Audrey demonstrated good  understanding of the education provided.     See EMR under Patient Instructions for exercises provided prior visit.    Assessment   Pt arrives to appointment and has not attended regularly due to several health issues which are being managed medically including blood clot and cellulitis.    She was eager to return to therapy.  She has been stretching at home and reports stretched reduce back pain.  She has also been trying to quite smoking and wants to make better lifestyle choices for her health.  She tolerated visit well with reducing back pain with stretching and no LE symptoms.   " She tolerated lumbar med x and peripheral strengthening well.   She has had intermittent  compliance due to many reasons but she reports she is dedicated to attending so she will be able to progress more appropriately with the Healthy Back program.    Reassessment indicates some gains in back ROM, and continued improvements in strength as indicated by med x weight progression. Continue progressing patient per Healthy Back protocol.      Patient is making fair progress towards established goals.  Pt will continue to benefit from skilled outpatient physical therapy to address the deficits stated in the impairment chart, provide pt/family education and to maximize pt's level of independence in the home and community environment.     Anticipated Barriers for therapy: compliance   Pt's spiritual, cultural and educational needs considered and pt agreeable to plan of care and goals as stated below:     Goals:   Short term goals:  5 weeks or 10 visits   1.  Pt will demonstrate increased lumbar ROM by at least 3 degrees from the initial ROM value with improvements noted in functional ROM and ability to perform ADLs. - progressing  2.  Pt will demonstrate increased maximum isometric torque value by 25% when compared to the initial value resulting in improved ability to perform bending, lifting, and carrying activities safely, confidently. - progressing  3.  Patient report a reduction in worst pain score by 1-2 points for improved tolerance for walking and household chores. - progressing  4.  Pt able to perform HEP correctly with minimal cueing or supervision from therapist to encourage independent management of symptoms. - progressing     Long term goals: 10 weeks or 20 visits   1. Pt will demonstrate increased lumbar ROM by at least 6 degrees from initial ROM value, resulting in improved ability to perform functional fwd bending while standing and sitting.   2. Pt will demonstrate increased maximum isometric torque value by  "50% when compared to the initial value resulting in improved ability to perform bending, lifting, and carrying activities safely, confidently.  3. Pt to demonstrate ability to independently control and reduce their pain through posture positioning and mechanical movements throughout a typical day.  4.  Pt will demonstrate reduced pain and improved functional outcomes as reported on the FOTO by reaching a score of CJ = at least 20% but < 40% impaired, limited or restricted or less in order to demonstrate subjective improvement in pt's condition.    5. Pt will demonstrate independence with the HEP at discharge  6.  Pt will be able to perform daily household chores with little to no low back pain.     Plan     Plan of Care Certification period: 7/1/19-10/1/19    Continue with 2x/wk. Continue with established Plan of Care towards established PT goals. Progress per Healthy Back protocol as tolerated.      Outpatient physical therapy 2x week for 10 weeks or 20 visits to include the following:   - Patient education  - Therapeutic exercise  - Manual therapy  - Performance testing   - Neuromuscular Re-education  - Therapeutic activity   - Modalities     Pt may be seen by PTA as part of the rehabilitation team.        "I certify the need for these services furnished under this plan of treatment and while under my care."     ____________________________________  Physician/Referring Practitioner     _______________  Date of Signature            Kelli Prakash, PTA  9/19/2019  "

## 2019-09-23 ENCOUNTER — CLINICAL SUPPORT (OUTPATIENT)
Dept: REHABILITATION | Facility: HOSPITAL | Age: 49
End: 2019-09-23
Attending: INTERNAL MEDICINE
Payer: MEDICAID

## 2019-09-23 DIAGNOSIS — M25.60 DECREASED RANGE OF MOTION: ICD-10-CM

## 2019-09-23 DIAGNOSIS — M54.42 CHRONIC LEFT-SIDED LOW BACK PAIN WITH LEFT-SIDED SCIATICA: ICD-10-CM

## 2019-09-23 DIAGNOSIS — R53.1 DECREASED STRENGTH, ENDURANCE, AND MOBILITY: ICD-10-CM

## 2019-09-23 DIAGNOSIS — Z74.09 DECREASED STRENGTH, ENDURANCE, AND MOBILITY: ICD-10-CM

## 2019-09-23 DIAGNOSIS — R68.89 DECREASED STRENGTH, ENDURANCE, AND MOBILITY: ICD-10-CM

## 2019-09-23 DIAGNOSIS — G89.29 CHRONIC LEFT-SIDED LOW BACK PAIN WITH LEFT-SIDED SCIATICA: ICD-10-CM

## 2019-09-23 PROCEDURE — 97110 THERAPEUTIC EXERCISES: CPT | Mod: PN | Performed by: PHYSICAL MEDICINE & REHABILITATION

## 2019-09-23 NOTE — PROGRESS NOTES
[facility]      Name: Audrey Natarajan  Clinic Number: [id number]    Therapy Diagnosis:   Encounter Diagnoses   Name Primary?    Chronic left-sided low back pain with left-sided sciatica     Decreased range of motion     Decreased strength, endurance, and mobility      Physician: Donaldo Pena MD    Visit Date: 2019     Physician Orders: PT Eval and Treat [facility]   Medical Diagnosis from Referral: M54.42,G89.29 (ICD-10-CM) - Chronic bilateral low back pain with left-sided sciatica  Evaluation Date: 2019  Authorization Period Expiration: 10/1/19  Plan of Care Expiration: 10/12/19  Reassessment Due:  10/12/19  Visit # / Visits authorized:     Time In: 1 pm  Time Out: 2:10 pm  Total Billable Time: 50 minutes    Precautions: Standard and Diabetes, history blood clots, cellulitis     Pattern of pain determined: 1 PEN    Subjective     Audrey reports that she has a little back pain today, 4/10 pre visit, 2/10 after visit.  Lifting and bending still irritate her back.  She does thinks she is feeling better now that she has attended more frequently after blood clot and other issues.  She knows she will test next visit    Patient reports their pain to be 4/10 on a 0-10 scale with 0 being no pain and 10 being the worst pain imaginable.  She was not sore after her last visit  Pain Location: low back      Occupation: None  Leisure: tv, clean house    Pt goals: decrease pain with activity    Objective     Baseline IM Testing Results:   Date of testin19  ROM 0-42 deg   Max Peak Torque 135    Min Peak Torque 54    Flex/Ext Ratio 2.5:1   % below normative data -21%       MOVEMENT LOSS     ROM Loss 19   Flexion moderate loss Min loss   Extension minimal loss Min loss   Side bending Right minimal loss Min loss   Side bending Left minimal loss Min loss   Rotation Right moderate loss Min loss   Rotation Left moderate loss Min loss        SLS 5 sec  "bilat  Sit to stand with min use of hands    Outcomes:  Initial score: 55% limitation FOTO 7/1/19  Visit 5 score: 47% limitation FOTO 8/12/19  Goal: 45% limitation FOTO           Treatment    Pt was instructed in and performed the following:     Audrey received therapeutic exercises to develop/improved posture, cardiovascular endurance, muscular endurance, lumbar/  ROM, strength and muscular endurance for 50 minutes including the following exercises:     Flexion in lie with ball, slight reduction of back pain 10 reps  LTR x2'  Open book 2x10   Bridges 3x10   EIS 10x10" holds, reduced back pain    Lifting floor to waist empty box with cuing needed for body [facility]  Simulation dishes and empty     [facility] Therapy 9/23/2019   Visit Number 9   VAS Pain Rating 4   Treadmill Time (in min.) 10   Speed 1.6   Incline -   Extension in Standing 10   Flexion in Lying 10   Manual Therapy -   Lumbar Extension Seat Pad -   Femur Restraint -   Top Dead Center -   Counterweight -   Lumbar Flexion -   Lumbar Extension -   Lumbar Peak Torque -   Min Torque -   Test Percent Below Normative Data -   Test Percent Gain in Strength from Initial  -   Lumbar Weight 65   Repetitions 20   Rating of Perceived Exertion 4   Ice - Z Lie (in min.) 0         Peripheral muscle strengthening which included 1 set of 15-20 repetitions at a slow, controlled 10-13 second per rep pace focused on strengthening supporting musculature for improved body mechanics and functional mobility.  Pt and therapist focused on proper form during treatment to ensure optimal strengthening of each targeted muscle group.  Machines were utilized including torso rotation, leg extension, leg curl, chest press, upright row. Tricep extension, bicep curl, leg press, and hip abduction.        Home Exercises Provided and Patient Education Provided   Flexion in lie  Lower trunk rotation  Ext in standing  Walk daily    Education provided:   - reasoning behind " extension-based exercises    Written Home Exercises Provided: Patient instructed to cont prior HEP.  Exercises were reviewed and Audrey was able to demonstrate them prior to the end of the session.  Audrey demonstrated good  understanding of the education provided.     See EMR under Patient Instructions for exercises provided prior visit.    Assessment   Pt arrives to appointment noting slowly improving abilities to function at home.  She benefited from body mech training with lifting and dish washing as she needed a lot of cuing not to bend.  She  has not attended regularly due to several health issues which are being managed medically including blood clot and cellulitis.    She is eager to return to therapy.  She knows she tests next visit.  She has been stretching at home and reports stretched reduce back pain.  She has also been trying to quite smoking and wants to make better lifestyle choices for her health.  She tolerated visit well with reducing back pain with stretching and no LE symptoms.   She tolerated lumbar med x and peripheral strengthening well.   She has had intermittent  compliance due to many reasons but she reports she is dedicated to attending so she will be able to progress more appropriately with the Healthy Back program.    Reassessment indicates some gains in back ROM, and continued improvements in strength as indicated by med x weight progression. Continue progressing patient per Healthy Back protocol.      Patient is making fair progress towards established goals.  Pt will continue to benefit from skilled outpatient physical therapy to address the deficits stated in the impairment chart, provide pt/family education and to maximize pt's level of independence in the home and community environment.     Anticipated Barriers for therapy: compliance   Pt's spiritual, cultural and educational needs considered and pt agreeable to plan of care and goals as stated below:     Goals:   Short term  goals:  5 weeks or 10 visits   1.  Pt will demonstrate increased lumbar ROM by at least 3 degrees from the initial ROM value with improvements noted in functional ROM and ability to perform ADLs. - progressing  2.  Pt will demonstrate increased maximum isometric torque value by 25% when compared to the initial value resulting in improved ability to perform bending, lifting, and carrying activities safely, confidently. - progressing  3.  Patient report a reduction in worst pain score by 1-2 points for improved tolerance for walking and household chores. - progressing  4.  Pt able to perform HEP correctly with minimal cueing or supervision from therapist to encourage independent management of symptoms. - progressing     Long term goals: 10 weeks or 20 visits   1. Pt will demonstrate increased lumbar ROM by at least 6 degrees from initial ROM value, resulting in improved ability to perform functional fwd bending while standing and sitting.   2. Pt will demonstrate increased maximum isometric torque value by 50% when compared to the initial value resulting in improved ability to perform bending, lifting, and carrying activities safely, confidently.  3. Pt to demonstrate ability to independently control and reduce their pain through posture positioning and mechanical movements throughout a typical day.  4.  Pt will demonstrate reduced pain and improved functional outcomes as reported on the FOTO by reaching a score of CJ = at least 20% but < 40% impaired, limited or restricted or less in order to demonstrate subjective improvement in pt's condition.    5. Pt will demonstrate independence with the HEP at discharge  6.  Pt will be able to perform daily household chores with little to no low back pain.     Plan     Plan of Care Certification period: 7/1/19-10/12/19    Continue with 2x/wk. Continue with established Plan of Care towards established PT goals. Progress per Healthy Back protocol as tolerated.            Conchis  Misha, PT  9/23/2019

## 2019-09-25 ENCOUNTER — LAB VISIT (OUTPATIENT)
Dept: LAB | Facility: HOSPITAL | Age: 49
End: 2019-09-25
Attending: INTERNAL MEDICINE
Payer: MEDICAID

## 2019-09-25 ENCOUNTER — INITIAL CONSULT (OUTPATIENT)
Dept: HEMATOLOGY/ONCOLOGY | Facility: CLINIC | Age: 49
End: 2019-09-25
Payer: MEDICAID

## 2019-09-25 VITALS
HEIGHT: 65 IN | OXYGEN SATURATION: 96 % | TEMPERATURE: 99 F | SYSTOLIC BLOOD PRESSURE: 126 MMHG | HEART RATE: 117 BPM | DIASTOLIC BLOOD PRESSURE: 60 MMHG | BODY MASS INDEX: 38.57 KG/M2 | WEIGHT: 231.5 LBS

## 2019-09-25 DIAGNOSIS — D68.59 HYPERCOAGULABLE STATE: Primary | ICD-10-CM

## 2019-09-25 DIAGNOSIS — Z86.711 HISTORY OF PULMONARY EMBOLISM: ICD-10-CM

## 2019-09-25 DIAGNOSIS — D68.59 HYPERCOAGULABLE STATE: ICD-10-CM

## 2019-09-25 PROCEDURE — 99205 PR OFFICE/OUTPT VISIT, NEW, LEVL V, 60-74 MIN: ICD-10-PCS | Mod: S$PBB,,, | Performed by: INTERNAL MEDICINE

## 2019-09-25 PROCEDURE — 81241 F5 GENE: CPT

## 2019-09-25 PROCEDURE — 99999 PR PBB SHADOW E&M-EST. PATIENT-LVL III: ICD-10-PCS | Mod: PBBFAC,,, | Performed by: INTERNAL MEDICINE

## 2019-09-25 PROCEDURE — 99213 OFFICE O/P EST LOW 20 MIN: CPT | Mod: PBBFAC | Performed by: INTERNAL MEDICINE

## 2019-09-25 PROCEDURE — 36415 COLL VENOUS BLD VENIPUNCTURE: CPT

## 2019-09-25 PROCEDURE — 99205 OFFICE O/P NEW HI 60 MIN: CPT | Mod: S$PBB,,, | Performed by: INTERNAL MEDICINE

## 2019-09-25 PROCEDURE — 99999 PR PBB SHADOW E&M-EST. PATIENT-LVL III: CPT | Mod: PBBFAC,,, | Performed by: INTERNAL MEDICINE

## 2019-09-25 RX ORDER — LISDEXAMFETAMINE DIMESYLATE 50 MG/1
CAPSULE ORAL
Refills: 0 | Status: ON HOLD | COMMUNITY
Start: 2019-09-19 | End: 2021-02-20 | Stop reason: HOSPADM

## 2019-09-25 RX ORDER — HYDROXYZINE PAMOATE 25 MG/1
CAPSULE ORAL
Status: ON HOLD | COMMUNITY
Start: 2015-09-14 | End: 2021-02-20 | Stop reason: HOSPADM

## 2019-09-25 NOTE — LETTER
September 25, 2019      Tone Mata MD  [address] [facility] [address]  [address]  [facility] [address]           [facility]  [address]  [facility] [address]  Phone: [phone]          Patient: Audrey Natarajan   MR Number: [id number]   YOB: 1970   Date of Visit: 9/25/2019       Dear Dr. Tone Mata:    Thank you for referring Audrey Natarajan to me for evaluation. Attached you will find relevant portions of my assessment and plan of care.    If you have questions, please do not hesitate to call me. I look forward to following Audrey Natarajan along with you.    Sincerely,    Chelita Sagastume MD    Enclosure  CC:  No Recipients    If you would like to receive this communication electronically, please contact [email or web address] or [phone] to request more information on [employer]print[employer] Link access.    For providers and/or their staff who would like to refer a patient to [facility], please contact us through our one-stop-shop provider referral line, [facility], at [phone].    If you feel you have received this communication in error or would no longer like to receive these types of communications, please e-mail [email or web address]

## 2019-09-25 NOTE — PROGRESS NOTES
Chief Complaint :  Chronic DVT    Hx of Present illness :  Patient is a 49 y.o. year old female who presents to the clinic today for  Oncology evaluation.  Hx of left PE . Hx of Bilateral DVT in . IVC filter placed in .  Hx of DVT Left Leg 3 weeks ago.        Allergies :    Review of patient's allergies indicates:   Allergen Reactions    Morphine Other (See Comments)     Patient had a psychotic episode after taking Morphine  Agitation, hallucinations    Penicillins Anaphylaxis     itching       Occupation :  [employer]    Transfusion :  Yes    Menstrual & obstetric Hx :   36. Para 3.  Age of menarche:  15  Age of first pregnancy: 26  Lactation history: N/A  Age of menopause:  oopherectomy   HRT: No    Present Meds :   Medication List with Changes/Refills   Current Medications    ACETAMINOPHEN (ARTHRITIS PAIN RELIEVER) 650 MG TBSR    Take 650 mg by mouth. Pt states taking 2 -3 times a day    ALBUTEROL (ACCUNEB) 0.63 MG/3 ML NEBU    Take 3 mLs (0.63 mg total) by nebulization every 8 (eight) hours as needed (wheezing). Rescue    ASPIRIN (ECOTRIN) 81 MG EC TABLET    Take 81 mg by mouth once daily.     AZELASTINE (ASTELIN) 137 MCG (0.1 %) NASAL SPRAY    1 spray (137 mcg total) by Nasal route 2 (two) times daily.    B COMPLEX VITAMINS TABLET    Take 1 tablet by mouth once daily.    BLOOD SUGAR DIAGNOSTIC STRP    To check BG once daily, to use with insurance preferred meter    BLOOD-GLUCOSE METER KIT    To check BG once daily, to use with insurance preferred meter    CARBAMAZEPINE (TEGRETOL) 200 MG TABLET    TK 2 TS PO BID    CICLOPIROX (LOPROX) 0.77 % SUSP    Apply topically once daily.    CICLOPIROX-NAIL LACQUER REMOVR 8 % KIT    Apply 1 application topically once a week.    CLOTRIMAZOLE (LOTRIMIN) 1 % CREAM    Apply topically 2 (two) times daily.    CYANOCOBALAMIN (VITAMIN B-12) 100 MCG TABLET    Take 1 tablet (100 mcg total) by mouth once daily.    DULERA 100-5 MCG/ACTUATION HFAA    INHALE 2  PUFFS INTO THE LUNGS TWICE DAILY    DUPIXENT 300 MG/2 ML SYRG    inject 300mg SUBCUTANEOUSLY every other week    FISH OIL-OMEGA-3 FATTY ACIDS 300-1,000 MG CAPSULE    Take 2 capsules by mouth once daily. Instructed to stop 5-7 days before surgery (8/11/16).    FLUTICASONE PROPIONATE (FLONASE ALLERGY RELIEF) 50 MCG/ACTUATION NASAL SPRAY    1 spray (50 mcg total) by Each Nare route 2 (two) times daily.    FUROSEMIDE (LASIX) 20 MG TABLET    TAKE 1 TABLET(20 MG) BY MOUTH EVERY DAY    GABAPENTIN (NEURONTIN) 600 MG TABLET    Take 1 tablet (600 mg total) by mouth 2 (two) times daily.    IBUPROFEN (ADVIL,MOTRIN) 600 MG TABLET    TAKE 1 TABLET(600 MG) BY MOUTH EVERY 8 HOURS AS NEEDED FOR PAIN    LANCETS MISC    To check BG once daily, to use with insurance preferred meter    LIDOCAINE 3 % CREA    Rub on leg for pain as needed no more than 3 times a day    LISINOPRIL (PRINIVIL,ZESTRIL) 5 MG TABLET    Take 1 tablet (5 mg total) by mouth once daily.    LORATADINE (CLARITIN) 10 MG TABLET    Take 1 tablet (10 mg total) by mouth once daily.    MELOXICAM (MOBIC) 15 MG TABLET    TAKE 1 TABLET(15 MG) BY MOUTH EVERY DAY    METFORMIN (GLUCOPHAGE) 1000 MG TABLET    TAKE 1 TABLET(1000 MG) BY MOUTH TWICE DAILY WITH MEALS    METHOCARBAMOL (ROBAXIN) 500 MG TAB    TAKE 1 TABLET(500 MG) BY MOUTH EVERY 6 HOURS AS NEEDED    METOPROLOL TARTRATE (LOPRESSOR) 25 MG TABLET    Take 1 tablet (25 mg total) by mouth once daily.    MULTIVITAMIN (THERAGRAN) PER TABLET    Take 1 tablet by mouth every morning.    NICOTINE POLACRILEX 2 MG LOZG    1-2 per hour in place of cigarettes maximum of 20 per day.    NYSTATIN (MYCOSTATIN) POWDER    Apply topically 2 (two) times daily.    OMEPRAZOLE 20 MG TBEC    TAKE 2 TABLETS BY MOUTH ONCE DAILY AT 6AM    OMEPRAZOLE 20 MG TBEC    TAKE 2 TABLETS BY MOUTH EVERY DAY AT 6 AM    PRAVASTATIN (PRAVACHOL) 40 MG TABLET    Take 1 tablet (40 mg total) by mouth once daily.    RISPERIDONE (RISPERDAL) 3 MG TAB    take at bedtime     VENTOLIN HFA 90 MCG/ACTUATION INHALER    INHALE 2 PUFFS INTO THE LUNGS EVERY 6 HOURS AS NEEDED FOR WHEEZING    VYVANSE 40 MG CAP    TK ONE C PO QAM       Past Medical Hx :  Reviewed    Past Medical Hx :  Past Medical History:   Diagnosis Date    ADHD (attention deficit hyperactivity disorder)     Arthritis     Asthma     Bipolar 1 disorder     Cataract     COPD (chronic obstructive pulmonary disease)     Coronary artery disease     A fib    Depression     bipolar manic depresson    Diabetes mellitus     DVT of lower extremity, bilateral 2013    bilateral LE DVT. Estelita filter placed.     Encounter for blood transfusion     History of blood clots 1. Left Leg=; 2.Bilateral Groin=Blood Clots=  or 2013 & 2013; 3. LLL of Lung=2013;  4. Lt. Lower Leg=2013.     Pt. had 1st Blood Clot after [id number], & Last=. Estelita Filter= Rt.Lateral Neck.    HTN (hypertension) 2013    Pt states that she does not have hypertension    Hypercholesteremia     Irregular heartbeat     Neuromuscular disorder     neuropathy feet    PE (pulmonary embolism) 2013     bilat LE DVT.     Restless leg syndrome        Travel Hx :  N/A    Immunization :  Immunization History   Administered Date(s) Administered    Hepatitis B, Adult 2014, 2014    Influenza 2015    Influenza - Quadrivalent 2015    Influenza - Quadrivalent - PF (6 months and older) 2016, 10/24/2017, 10/31/2018, 2019    Influenza - Trivalent - PF (ADULT) 2015    Pneumococcal Polysaccharide - 23 Valent 10/24/2017    Tdap 2014       Family Hx :  Family History   Problem Relation Age of Onset    Hypertension Father     Diabetes Father     Heart disease Father     Cataracts Father     Diabetes Paternal Grandfather     Heart disease Paternal Grandfather     No Known Problems Mother     Ovarian cancer Maternal Grandmother          from this. ? age     No Known  Problems Sister     No Known Problems Brother     No Known Problems Maternal Aunt     No Known Problems Maternal Uncle     No Known Problems Paternal Aunt     No Known Problems Paternal Uncle     No Known Problems Maternal Grandfather     Ovarian cancer Paternal Grandmother     Uterine cancer Neg Hx     Breast cancer Neg Hx     Colon cancer Neg Hx     Amblyopia Neg Hx     Blindness Neg Hx     Cancer Neg Hx     Glaucoma Neg Hx     Macular degeneration Neg Hx     Retinal detachment Neg Hx     Strabismus Neg Hx     Stroke Neg Hx     Thyroid disease Neg Hx        Social Hx :  Social History     Socioeconomic History    Marital status:      Spouse name: Not on file    Number of children: Not on file    Years of education: Not on file    Highest education level: Not on file   Occupational History    Not on file   Social Needs    Financial resource strain: Not on file    Food insecurity:     Worry: Not on file     Inability: Not on file    Transportation needs:     Medical: Not on file     Non-medical: Not on file   Tobacco Use    Smoking status: Current Every Day Smoker     Packs/day: 0.50     Years: 25.00     Pack years: 12.50     Types: Cigarettes    Smokeless tobacco: Never Used   Substance and Sexual Activity    Alcohol use: No     Alcohol/week: 0.0 standard drinks    Drug use: No    Sexual activity: Not Currently   Lifestyle    Physical activity:     Days per week: Not on file     Minutes per session: Not on file    Stress: Not on file   Relationships    Social connections:     Talks on phone: Not on file     Gets together: Not on file     Attends Latter day service: Not on file     Active member of club or organization: Not on file     Attends meetings of clubs or organizations: Not on file     Relationship status: Not on file   Other Topics Concern    Not on file   Social History Narrative    Not on file       Surgery :  Oopherectomy. Colonoscopy and EGD 5 years ago.      Symptoms :    Review of Systems   Constitutional: Negative for chills, diaphoresis, fever, malaise/fatigue and weight loss.   HENT: Negative for congestion, hearing loss, nosebleeds, sore throat and tinnitus.    Eyes: Negative for blurred vision, double vision and photophobia.   Respiratory: Negative for cough, hemoptysis and shortness of breath.    Cardiovascular: Negative for chest pain, palpitations, orthopnea, claudication, leg swelling and PND.   Gastrointestinal: Negative for abdominal pain, blood in stool, diarrhea, heartburn, nausea and vomiting.   Genitourinary: Negative for dysuria, flank pain, frequency, hematuria and urgency.   Musculoskeletal: Positive for back pain and neck pain. Negative for falls, joint pain and myalgias.   Skin: Negative for itching and rash.   Neurological: Negative for dizziness, tingling, tremors, sensory change and headaches.   Endo/Heme/Allergies: Negative for environmental allergies and polydipsia. Does not bruise/bleed easily.   Psychiatric/Behavioral: Negative for depression and memory loss. The patient is nervous/anxious. The patient does not have insomnia.        Physical Exam :   Physical Exam   Constitutional: She is oriented to person, place, and time and well-developed, well-nourished, and in no distress. Vital signs are normal. No distress.   HENT:   Head: Normocephalic and atraumatic.   Right Ear: External ear normal.   Left Ear: External ear normal.   Nose: Nose normal.   Mouth/Throat: Oropharynx is clear and moist. No oropharyngeal exudate.   Eyes: Pupils are equal, round, and reactive to light. Conjunctivae, EOM and lids are normal. Lids are everted and swept, no foreign bodies found. Right eye exhibits no discharge. Left eye exhibits no discharge. No scleral icterus.   Neck: Trachea normal, normal range of motion and full passive range of motion without pain. Neck supple. Normal carotid pulses, no hepatojugular reflux and no JVD present. No tracheal  tenderness present. Carotid bruit is not present. No tracheal deviation present. No thyroid mass and no thyromegaly present.   Cardiovascular: Normal rate, regular rhythm, normal heart sounds, intact distal pulses and normal pulses. PMI is not displaced. Exam reveals no gallop and no friction rub.   No murmur heard.  Pulmonary/Chest: Effort normal and breath sounds normal. No stridor. No apnea.   Abdominal: Soft. Normal appearance, normal aorta and bowel sounds are normal. She exhibits no distension and no mass. There is no hepatosplenomegaly. There is no tenderness. There is no rebound, no guarding and no CVA tenderness. No hernia.   Musculoskeletal: Normal range of motion. She exhibits no edema, tenderness or deformity.   Lymphadenopathy:        Head (right side): No submental, no submandibular, no tonsillar, no preauricular, no posterior auricular and no occipital adenopathy present.        Head (left side): No submental, no submandibular, no tonsillar, no preauricular, no posterior auricular and no occipital adenopathy present.     She has no cervical adenopathy.     She has no axillary adenopathy.        Right: No inguinal, no supraclavicular and no epitrochlear adenopathy present.        Left: No inguinal, no supraclavicular and no epitrochlear adenopathy present.   Neurological: She is alert and oriented to person, place, and time. She has normal motor skills, normal sensation, normal strength, normal reflexes and intact cranial nerves. She displays normal reflexes. No cranial nerve deficit. She exhibits normal muscle tone. Gait normal. Coordination normal. GCS score is 15.   Skin: Skin is warm, dry and intact. No rash noted. She is not diaphoretic. No cyanosis or erythema. No pallor. Nails show no clubbing.   Psychiatric: Mood, memory, affect and judgment normal.   Nursing note and vitals reviewed.        Labs & Imaging :  09/01/19 : U/S left lower extremity.  Thrombosis of one of the left posterior tibial  veins,  U/S in 2017, showed similar finding.         Dx :  Chronic DVT left Leg. Hx of PE. Hx of Bilateral DVT      Assessment & Plan:  Reviewed with patient. Coag labs ordered. If indicated family members will be checked. Re evaluate with results.

## 2019-09-27 ENCOUNTER — CLINICAL SUPPORT (OUTPATIENT)
Dept: REHABILITATION | Facility: HOSPITAL | Age: 49
End: 2019-09-27
Attending: INTERNAL MEDICINE
Payer: MEDICAID

## 2019-09-27 DIAGNOSIS — R53.1 DECREASED STRENGTH, ENDURANCE, AND MOBILITY: ICD-10-CM

## 2019-09-27 DIAGNOSIS — M54.42 CHRONIC LEFT-SIDED LOW BACK PAIN WITH LEFT-SIDED SCIATICA: ICD-10-CM

## 2019-09-27 DIAGNOSIS — M25.60 DECREASED RANGE OF MOTION: ICD-10-CM

## 2019-09-27 DIAGNOSIS — G89.29 CHRONIC LEFT-SIDED LOW BACK PAIN WITH LEFT-SIDED SCIATICA: ICD-10-CM

## 2019-09-27 DIAGNOSIS — Z74.09 DECREASED STRENGTH, ENDURANCE, AND MOBILITY: ICD-10-CM

## 2019-09-27 DIAGNOSIS — R68.89 DECREASED STRENGTH, ENDURANCE, AND MOBILITY: ICD-10-CM

## 2019-09-27 LAB — F5 P.R506Q BLD/T QL: NORMAL

## 2019-09-27 PROCEDURE — 97110 THERAPEUTIC EXERCISES: CPT | Mod: PN

## 2019-09-27 NOTE — PROGRESS NOTES
[facility]      Name: Audrey Natarajan  Clinic Number: [id number]    Therapy Diagnosis:   Encounter Diagnoses   Name Primary?    Chronic left-sided low back pain with left-sided sciatica     Decreased range of motion     Decreased strength, endurance, and mobility      Physician: Donaldo Pena MD    Visit Date: 2019     Physician Orders: PT Eval and Treat [facility]   Medical Diagnosis from Referral: M54.42,G89.29 (ICD-10-CM) - Chronic bilateral low back pain with left-sided sciatica  Evaluation Date: 2019  Authorization Period Expiration: 10/1/19  Plan of Care Expiration: 10/12/19  Reassessment Due:  10/27/2019  Visit # / Visits authorized: 10 / 14    Time In:  1:35 pm  Time Out: 2:30  pm  Total Billable Time: 25 minutes    Precautions: Standard and Diabetes, history blood clots, cellulitis     Pattern of pain determined: 1 PEN    Subjective     Audrey reports that she has a little back pain today, 4/10 pre visit, 2/10 after visit.  Pt states she feel a lot of pain in the left foot. Pt states she had chronic blood clot left leg.     Patient reports their pain to be 4/10 on a 0-10 scale with 0 being no pain and 10 being the worst pain imaginable.  She was not sore after her last visit  Pain Location: low back      Occupation: None  Leisure: tv, clean house    Pt goals: decrease pain with activity    Objective     Baseline IM Testing Results:   Date of testin19  ROM 0-42 deg   Max Peak Torque 135    Min Peak Torque 54    Flex/Ext Ratio 2.5:1   % below normative data -21%     Mid-term  IM Testing Results:   Date of testin2019  ROM 0-45 deg   Max Peak Torque 144 ft.lbs   Min Peak Torque 86 ft.lbs   Flex/Ext Ratio 1.6   % Above normative data 52%             MOVEMENT LOSS     ROM Loss 19   Flexion moderate loss Min loss   Extension minimal loss Min loss   Side bending Right minimal loss Min loss   Side bending Left minimal loss Min loss  "  Rotation Right moderate loss Min loss   Rotation Left moderate loss Min loss        SLS 5 sec bilat  Sit to stand with min use of hands    Outcomes:  Initial score: 55% limitation FOTO 7/1/19  Visit 5 score: 47% limitation FOTO 8/12/19  Goal: 45% limitation FOTO           Treatment    Pt was instructed in and performed the following:     Audrey received therapeutic exercises to develop/improved posture, cardiovascular endurance, muscular endurance, lumbar/  ROM, strength and muscular endurance for 50 minutes including the following exercises:     Flexion in lie with ball, slight reduction of back pain 10 reps  LTR x2'  Open book 2x10   Bridges 3x10   EIS 10x10" holds, reduced back pain    Lifting floor to waist empty box with cuing needed for body [facility]  Simulation dishes and empty     [facility] 9/27/2019   Visit Number 10   VAS Pain Rating 4   Treadmill Time (in min.) 10   Speed 1.6   Incline 0   Extension in Standing 10   Flexion in Lying 10   Manual Therapy 0   Lumbar Extension Seat Pad 0   Femur Restraint 5   Top Dead Center 24   Counterweight 338   Lumbar Flexion 45   Lumbar Extension 0   Lumbar Peak Torque 144   Min Torque 86   Test Percent Below Normative Data -   Test Percent Gain in Strength from Initial  -   Lumbar Weight 65   Repetitions -   Rating of Perceived Exertion -   Ice - Z Lie (in min.) 0         Peripheral muscle strengthening which included 1 set of 15-20 repetitions at a slow, controlled 10-13 second per rep pace focused on strengthening supporting musculature for improved body mechanics and functional mobility.  Pt and therapist focused on proper form during treatment to ensure optimal strengthening of each targeted muscle group.  Machines were utilized including torso rotation, leg extension, leg curl, chest press, upright row. Tricep extension, bicep curl, leg press, and hip abduction.        Home Exercises Provided and Patient Education Provided   Flexion in lie  Lower " trunk rotation  Ext in standing  Walk daily    Education provided:   - reasoning behind extension-based exercises    Written Home Exercises Provided: Patient instructed to cont prior HEP.  Exercises were reviewed and Audrey was able to demonstrate them prior to the end of the session.  Audrey demonstrated good  understanding of the education provided.     See EMR under Patient Instructions for exercises provided prior visit.    Assessment      Patient has attended 10 visits at [facility] which included MD evaluation, PT evaluation with isometric testing, and physical therapy treatment including HEP instruction, education, aerobic work, dynamic strengthening on med ex equipment for the spine, and whole body strengthening on med ex equipment with increasing weight loads.  Patient  is demonstrating increased ability to reduce symptoms, improved posture, improved   ROM, and improved  strength on med ex test average. Pt scored 52% above the normative data today. Pt was 21% below normative data in the initial eval. Pt demonstrated Max peak torque of 144 ft.lbs and Min peak torque of 86 ft.lbs today. Pt demonstrated an improvement since initial eval (Max peak torque of 135 ft.lbs  and Min peak torque of 54 ft.lbs ). Pt has met 1/4 STGs today. Overall, pt is tolerating HB program well. Cont skilled PT services to decrease functional limitations.    Continue progressing patient per [facility] protocol.      Patient is making fair progress towards established goals.  Pt will continue to benefit from skilled outpatient physical therapy to address the deficits stated in the impairment chart, provide pt/family education and to maximize pt's level of independence in the home and community environment.     Anticipated Barriers for therapy: compliance   Pt's spiritual, cultural and educational needs considered and pt agreeable to plan of care and goals as stated below:     Goals:   Short term goals:  5 weeks or 10 visits    1.  Pt will demonstrate increased lumbar ROM by at least 3 degrees from the initial ROM value with improvements noted in functional ROM and ability to perform ADLs. - Goal met 9-  2.  Pt will demonstrate increased maximum isometric torque value by 25% when compared to the initial value resulting in improved ability to perform bending, lifting, and carrying activities safely, confidently. - Goal not met 9-  3.  Patient report a reduction in worst pain score by 1-2 points for improved tolerance for walking and household chores. - Goal not met 9-  4.  Pt able to perform HEP correctly with minimal cueing or supervision from therapist to encourage independent management of symptoms. - Goal met 9-     Long term goals: 10 weeks or 20 visits   1. Pt will demonstrate increased lumbar ROM by at least 6 degrees from initial ROM value, resulting in improved ability to perform functional fwd bending while standing and sitting.   2. Pt will demonstrate increased maximum isometric torque value by 50% when compared to the initial value resulting in improved ability to perform bending, lifting, and carrying activities safely, confidently.  3. Pt to demonstrate ability to independently control and reduce their pain through posture positioning and mechanical movements throughout a typical day.  4.  Pt will demonstrate reduced pain and improved functional outcomes as reported on the FOTO by reaching a score of CJ = at least 20% but < 40% impaired, limited or restricted or less in order to demonstrate subjective improvement in pt's condition.    5. Pt will demonstrate independence with the HEP at discharge  6.  Pt will be able to perform daily household chores with little to no low back pain.     Plan     Plan of Care Certification period: 7/1/19-10/12/19    Continue with 2x/wk. Continue with established Plan of Care towards established PT goals. Progress per [facility] protocol as tolerated.         Mayur He, PT  9/27/2019

## 2019-09-30 ENCOUNTER — CLINICAL SUPPORT (OUTPATIENT)
Dept: REHABILITATION | Facility: HOSPITAL | Age: 49
End: 2019-09-30
Attending: INTERNAL MEDICINE
Payer: MEDICAID

## 2019-09-30 ENCOUNTER — OFFICE VISIT (OUTPATIENT)
Dept: PODIATRY | Facility: CLINIC | Age: 49
End: 2019-09-30
Payer: MEDICAID

## 2019-09-30 ENCOUNTER — HOSPITAL ENCOUNTER (OUTPATIENT)
Dept: RADIOLOGY | Facility: HOSPITAL | Age: 49
Discharge: HOME | End: 2019-09-30
Attending: PODIATRIST
Payer: MEDICAID

## 2019-09-30 VITALS
SYSTOLIC BLOOD PRESSURE: 137 MMHG | DIASTOLIC BLOOD PRESSURE: 77 MMHG | HEART RATE: 110 BPM | BODY MASS INDEX: 38.49 KG/M2 | WEIGHT: 231 LBS | HEIGHT: 65 IN

## 2019-09-30 DIAGNOSIS — R68.89 DECREASED STRENGTH, ENDURANCE, AND MOBILITY: ICD-10-CM

## 2019-09-30 DIAGNOSIS — M76.72 PERONEUS BREVIS TENDONITIS, LEFT: ICD-10-CM

## 2019-09-30 DIAGNOSIS — M20.5X2 HALLUX LIMITUS, ACQUIRED, LEFT: ICD-10-CM

## 2019-09-30 DIAGNOSIS — S86.892D LEFT MEDIAL TIBIAL STRESS SYNDROME, SUBSEQUENT ENCOUNTER: ICD-10-CM

## 2019-09-30 DIAGNOSIS — M54.42 CHRONIC LEFT-SIDED LOW BACK PAIN WITH LEFT-SIDED SCIATICA: ICD-10-CM

## 2019-09-30 DIAGNOSIS — B35.1 ONYCHOMYCOSIS DUE TO DERMATOPHYTE: ICD-10-CM

## 2019-09-30 DIAGNOSIS — E11.49 TYPE II DIABETES MELLITUS WITH NEUROLOGICAL MANIFESTATIONS: Primary | ICD-10-CM

## 2019-09-30 DIAGNOSIS — G89.29 CHRONIC LEFT-SIDED LOW BACK PAIN WITH LEFT-SIDED SCIATICA: ICD-10-CM

## 2019-09-30 DIAGNOSIS — S93.402D INVERSION SPRAIN OF ANKLE, LEFT, SUBSEQUENT ENCOUNTER: ICD-10-CM

## 2019-09-30 DIAGNOSIS — E11.49 TYPE II DIABETES MELLITUS WITH NEUROLOGICAL MANIFESTATIONS: ICD-10-CM

## 2019-09-30 DIAGNOSIS — R53.1 DECREASED STRENGTH, ENDURANCE, AND MOBILITY: ICD-10-CM

## 2019-09-30 DIAGNOSIS — M20.5X1 HALLUX LIMITUS, ACQUIRED, RIGHT: ICD-10-CM

## 2019-09-30 DIAGNOSIS — Z74.09 DECREASED STRENGTH, ENDURANCE, AND MOBILITY: ICD-10-CM

## 2019-09-30 DIAGNOSIS — M84.375G: ICD-10-CM

## 2019-09-30 DIAGNOSIS — M25.60 DECREASED RANGE OF MOTION: ICD-10-CM

## 2019-09-30 DIAGNOSIS — E11.9 COMPREHENSIVE DIABETIC FOOT EXAMINATION, TYPE 2 DM, ENCOUNTER FOR: ICD-10-CM

## 2019-09-30 DIAGNOSIS — M20.12 HALLUX ABDUCTO VALGUS, LEFT: ICD-10-CM

## 2019-09-30 DIAGNOSIS — M20.42 HAMMER TOES OF BOTH FEET: ICD-10-CM

## 2019-09-30 DIAGNOSIS — M20.41 HAMMER TOES OF BOTH FEET: ICD-10-CM

## 2019-09-30 PROCEDURE — 99214 PR OFFICE/OUTPT VISIT, EST, LEVL IV, 30-39 MIN: ICD-10-PCS | Mod: S$PBB,,, | Performed by: PODIATRIST

## 2019-09-30 PROCEDURE — 73630 X-RAY EXAM OF FOOT: CPT | Mod: TC,FY,PO,LT

## 2019-09-30 PROCEDURE — 99214 OFFICE O/P EST MOD 30 MIN: CPT | Mod: S$PBB,,, | Performed by: PODIATRIST

## 2019-09-30 PROCEDURE — 97110 THERAPEUTIC EXERCISES: CPT | Mod: PN | Performed by: PHYSICAL MEDICINE & REHABILITATION

## 2019-09-30 PROCEDURE — 73630 X-RAY EXAM OF FOOT: CPT | Mod: 26,LT,, | Performed by: RADIOLOGY

## 2019-09-30 PROCEDURE — 99999 PR PBB SHADOW E&M-EST. PATIENT-LVL III: ICD-10-PCS | Mod: PBBFAC,,, | Performed by: PODIATRIST

## 2019-09-30 PROCEDURE — 73630 XR FOOT COMPLETE 3 VIEW LEFT: ICD-10-PCS | Mod: 26,LT,, | Performed by: RADIOLOGY

## 2019-09-30 PROCEDURE — 99999 PR PBB SHADOW E&M-EST. PATIENT-LVL III: CPT | Mod: PBBFAC,,, | Performed by: PODIATRIST

## 2019-09-30 PROCEDURE — 99213 OFFICE O/P EST LOW 20 MIN: CPT | Mod: PBBFAC,25,PO | Performed by: PODIATRIST

## 2019-09-30 NOTE — PROGRESS NOTES
[facility]      Name: Audrey Natarajan  Clinic Number: [id number]    Therapy Diagnosis:   Encounter Diagnoses   Name Primary?    Chronic left-sided low back pain with left-sided sciatica     Decreased range of motion     Decreased strength, endurance, and mobility      Physician: Donaldo Pena MD    Visit Date: 2019     Physician Orders: PT Eval and Treat [facility]   Medical Diagnosis from Referral: M54.42,G89.29 (ICD-10-CM) - Chronic bilateral low back pain with left-sided sciatica  Evaluation Date: 2019  Authorization Period Expiration: 10/1/19  Plan of Care Expiration: 10/1/19  Reassessment Due:  10/27/2019  Visit # / Visits authorized:     Time In:  1:00  pm  Time Out: 2:06  pm  Total Billable Time: 35 minutes    Precautions: Standard and Diabetes, history blood clots, cellulitis     Pattern of pain determined: 1 PEN    Subjective     Audrey reports that she has a little back pain today, 1/10 pre visit, 0/10 after visit.  Pt states she feel a lot of pain in the left foot. Pt states she had chronic blood clot left leg. She returned to her physician and they feel with her diabetics and previous foot fracture she needs to rest more.   Audrey reports she is on her feet at home a lot.  We discussed Z lie 3/day for her back and her foot.  Physician recommended continuing healthy back therapy but bike not treadmill.   Patient tolerated visit well without increased foot pain.    Patient reports their pain to be 1/10 on a 0-10 scale with 0 being no pain and 10 being the worst pain imaginable.  She was not sore after her last visit  Pain Location: low back      Occupation: None  Leisure: tv, clean house    Pt goals: decrease pain with activity    Objective     Baseline IM Testing Results:   Date of testin19  ROM 0-42 deg   Max Peak Torque 135    Min Peak Torque 54    Flex/Ext Ratio 2.5:1   % below normative data -21%     Mid-term  IM Testing  "Results:   Date of testin2019  ROM 0-45 deg   Max Peak Torque 144 ft.lbs   Min Peak Torque 86 ft.lbs   Flex/Ext Ratio 1.6   % Above normative data 52%             MOVEMENT LOSS     ROM Loss 19   Flexion moderate loss Min loss   Extension minimal loss Min loss   Side bending Right minimal loss Min loss   Side bending Left minimal loss Min loss   Rotation Right moderate loss Min loss   Rotation Left moderate loss Min loss        SLS 5 sec bilat  Sit to stand with min use of hands    Outcomes:  Initial score: 55% limitation FOTO 19  Visit 5 score: 47% limitation FOTO 19  Visit 11 score 50% limited FOTO 19  Goal: 45% limitation FOTO         Treatment    Pt was instructed in and performed the following:     Audrey received therapeutic exercises to develop/improved posture, cardiovascular endurance, muscular endurance, lumbar/  ROM, strength and muscular endurance for 50 minutes including the following exercises:     Flexion in lie with ball, slight reduction of back pain 10 reps  LTR x2'  Open book 2x10   Bridges 3x10   EIS 10x10" holds, reduced back pain    Lifting floor to waist empty box with cuing needed for body [facility]  Simulation dishes and empty       [facility] 2019   Visit Number 11   VAS Pain Rating 0   Treadmill Time (in min.) -   Speed -   Incline -   Recumbent Bike Seat Pos. 1   Time 10   Extension in Standing 10   Flexion in Lying 10   Manual Therapy -   Lumbar Extension Seat Pad -   Femur Restraint -   Top Dead Center -   Counterweight -   Lumbar Flexion -   Lumbar Extension -   Lumbar Peak Torque -   Min Torque -   Test Percent Below Normative Data -   Test Percent Gain in Strength from Initial  -   Lumbar Weight 68   Repetitions 20   Rating of Perceived Exertion 4   Ice - Z Lie (in min.) 10 back and left foot           Peripheral muscle strengthening which included 1 set of 15-20 repetitions at a slow, controlled 10-13 second per rep pace focused on " strengthening supporting musculature for improved body mechanics and functional mobility.  Pt and therapist focused on proper form during treatment to ensure optimal strengthening of each targeted muscle group.  Machines were utilized including torso rotation, leg extension, leg curl, chest press, upright row. Tricep extension, bicep curl,hip add and hip abduction.        Home Exercises Provided and Patient Education Provided   Flexion in lie  Lower trunk rotation  Ext in standing  Z lie for back and left foot rest    Education provided:   - reasoning behind extension-based exercises    Written Home Exercises Provided: Patient instructed to cont prior HEP.  Exercises were reviewed and Audrey was able to demonstrate them prior to the end of the session.  Audrey demonstrated good  understanding of the education provided.     See EMR under Patient Instructions for exercises provided prior visit.    Assessment      Patient has attended 11 visits at [facility]  And demonstrates FOTO score improvement for function, as well as reduced pain in back and improved strength.   She has had recent health issues and her left foot diabetis and old fracture are causing some pain.  We will use bike and monitor her foot during sessions.  Overall, pt is tolerating HB program well. Cont skilled PT services to decrease functional limitations.    Continue progressing patient per [facility] protocol.      Patient is making fair progress towards established goals.  Pt will continue to benefit from skilled outpatient physical therapy to address the deficits stated in the impairment chart, provide pt/family education and to maximize pt's level of independence in the home and community environment.     Anticipated Barriers for therapy: compliance   Pt's spiritual, cultural and educational needs considered and pt agreeable to plan of care and goals as stated below:     Goals:   Short term goals:  5 weeks or 10 visits   1.  Pt will  demonstrate increased lumbar ROM by at least 3 degrees from the initial ROM value with improvements noted in functional ROM and ability to perform ADLs. - Goal met 9-  2.  Pt will demonstrate increased maximum isometric torque value by 25% when compared to the initial value resulting in improved ability to perform bending, lifting, and carrying activities safely, confidently. - Goal not met 9-  3.  Patient report a reduction in worst pain score by 1-2 points for improved tolerance for walking and household chores. - Goal not met 9-  4.  Pt able to perform HEP correctly with minimal cueing or supervision from therapist to encourage independent management of symptoms. - Goal met 9-     Long term goals: 10 weeks or 20 visits   1. Pt will demonstrate increased lumbar ROM by at least 6 degrees from initial ROM value, resulting in improved ability to perform functional fwd bending while standing and sitting.   2. Pt will demonstrate increased maximum isometric torque value by 50% when compared to the initial value resulting in improved ability to perform bending, lifting, and carrying activities safely, confidently.  3. Pt to demonstrate ability to independently control and reduce their pain through posture positioning and mechanical movements throughout a typical day.  4.  Pt will demonstrate reduced pain and improved functional outcomes as reported on the FOTO by reaching a score of CJ = at least 20% but < 40% impaired, limited or restricted or less in order to demonstrate subjective improvement in pt's condition.    5. Pt will demonstrate independence with the HEP at discharge  6.  Pt will be able to perform daily household chores with little to no low back pain.     Plan     Plan of Care Certification period: 7/1/19-10/1/19    Continue with 2x/wk. Continue with established Plan of Care towards established PT goals. Progress per [facility] protocol as tolerated.        Conchis Cabral,  PT  9/30/2019

## 2019-09-30 NOTE — PLAN OF CARE
"    Patient continues to make gains in therapy in ROM and strength and function.  Recommend continued therpy.    POC certification 10/1/19-11/1/19    Plan of care re certification    Outpatient physical therapy 2x week for 10  More visits to include the following:   - Patient education  - Therapeutic exercise  - Manual therapy  - Performance testing   - Neuromuscular Re-education  - Therapeutic activity   - Modalities    Pt may be seen by PTA as part of the rehabilitation team.     Therapist:Conchis Cabral, PT    "I certify the need for these services furnished under this plan of treatment and while under my care."    ____________________________________  Physician/Referring Practitioner    _______________  Date of Signature    "

## 2019-09-30 NOTE — PATIENT INSTRUCTIONS
Recommend lotions: eucerin, eucerin for diabetics, aquaphor, A&D ointment, gold bond for diabetics, sween, Gooding's Bees all purpose baby ointment,  urea 40 with aloe (found on amazon.com)    Shoe recommendations: (try 6pm.com, zappos.com , nordstromrack.OneClass, or shoes.OneClass for discounted prices) you can visit [employer] in [place]  or [email or web address] | [employer] in the [facility] (there are also several shoe brand outlets in the [facility])    Asics (GT 2000 or gel foundations), new balance stability type shoes, saucony (stabil c3),  Shi (GTS or Beast or transcend), propet (tennis shoe)    Sofft Brand or axxiom (women) Steven&Tee (men), clarks, crocs, aerosoles, naturalizers, SAS, ecco, born, jason gurrola, rockports (dress shoes)    Vionic, burkenstocks, fitflops, propet (sandals)  Nike comfort thong sandals, crocs, propet (house shoes)    Nail Home remedy:  Vicks Vapor rub to nails for easier manageability    Diabetes: Inspecting Your Feet  Diabetes increases your chances of developing foot problems. So inspect your feet every day. This helps you find small skin irritations before they become serious infections. If you have trouble seeing the bottoms of your feet, use a mirror or ask a family member or friend to help.     Pressure spots on the bottom of the foot are common areas where problems develop.   How to check your feet  Below are tips to help you look for foot problems. Try to check your feet at the same time each day, such as when you get out of bed in the morning:  · Check the top of each foot. The tops of toes, back of the heel, and outer edge of the foot can get a lot of rubbing from poor-fitting shoes.  · Check the bottom of each foot. Daily wear and tear often leads to problems at pressure spots.  · Check the toes and nails. Fungal infections often occur between toes. Toenail problems can also be a sign of fungal infections or lead to breaks in the skin.  · Check your shoes, too. Loose objects inside a shoe can  injure the foot. Use your hand to feel inside your shoes for things like tami, loose stitching, or rough areas that could irritate your skin.  Warning signs  Look for any color changes in the foot. Redness with streaks can signal a severe infection, which needs immediate medical attention. Tell your doctor right away if you have any of these problems:  · Swelling, sometimes with color changes, may be a sign of poor blood flow or infection. Symptoms include tenderness and an increase in the size of your foot.  · Warm or hot areas on your feet may be signs of infection. A foot that is cold may not be getting enough blood.  · Sensations such as burning, tingling, or pins and needles can be signs of a problem. Also check for areas that may be numb.  · Hot spots are caused by friction or pressure. Look for hot spots in areas that get a lot of rubbing. Hot spots can turn into blisters, calluses, or sores.  · Cracks and sores are caused by dry or irritated skin. They are a sign that the skin is breaking down, which can lead to infection.  · Toenail problems to watch for include nails growing into the skin (ingrown toenail) and causing redness or pain. Thick, yellow, or discolored nails can signal a fungal infection.  · Drainage and odor can develop from untreated sores and ulcers. Call your doctor right away if you notice white or yellow drainage, bleeding, or unpleasant odor.   © 7919-7374 [employer]. [address] [place] [address]. All rights reserved. This information is not intended as a substitute for professional medical care. Always follow your healthcare professional's instructions.        Step-by-Step:  Inspecting Your Feet (Diabetes)    Date Last Reviewed: 10/1/2016  © 5253-7526 [employer]. [address] [place] [address]. All rights reserved. This information is not intended as a substitute for professional medical care. Always follow your healthcare  professional's instructions.

## 2019-09-30 NOTE — PROGRESS NOTES
rSubjective:      Patient ID: Audrey Natarajan is a 49 y.o. female.    Chief Complaint: Foot Injury (ov Dr Garnett) and Foot Pain      Audrey Natarajan is a 49 y.o. female who presents to the podiatry clinic  with complaint of left shin and central metatarsal pain.  Description: moderate Nature: aching, throbbing and bruising Onset of the symptoms was several months ago. Precipitating event: increased activity while on vacation.  History of injury: no Current symptoms include: worsening symptoms after a period of activity. Aggravating factors: standing and walking. Alleviating factors: rest. Symptoms have progressed to a point and plateaued. Patient has had prior foot problems. Evaluation to date: none. Treatment to date: none. Patients rates pain 6/10 on pain scale.      09/30/2019 patient presents to clinic for follow-up of persistent right foot and leg pain.  She relates that she discontinue use of Cam boot several days ago with instruction her primary care physician.  Patient states that she may have re-injured the foot secondary to a misstep a few days ago. She continues to have pain to the forefoot and lateral foot and ankle as well as the anterior leg.  Patient relates that she has done some icing.  She relates that she has not been taking any anti-inflammatories consistently.  Patient relates that she uses a lidocaine cream for pain which is somewhat useful.  Patient denies nausea, vomiting, fever, chills    PCP: Donaldo Pena MD    Date Last Seen by PCP:   Chief Complaint   Patient presents with    Foot Injury     ov Dr Garnett    Foot Pain       Hemoglobin A1C   Date Value Ref Range Status   05/31/2019 5.9 (H) 4.0 - 5.6 % Final     Comment:     ADA Screening Guidelines:  5.7-6.4%  Consistent with prediabetes  >or=6.5%  Consistent with diabetes  High levels of fetal hemoglobin interfere with the HbA1C  assay. Heterozygous hemoglobin variants (HbS, HgC, etc)do  not significantly interfere with  this assay.   However, presence of multiple variants may affect accuracy.     01/30/2019 6.6 (H) 4.0 - 5.6 % Final     Comment:     ADA Screening Guidelines:  5.7-6.4%  Consistent with prediabetes  >or=6.5%  Consistent with diabetes  High levels of fetal hemoglobin interfere with the HbA1C  assay. Heterozygous hemoglobin variants (HbS, HgC, etc)do  not significantly interfere with this assay.   However, presence of multiple variants may affect accuracy.     06/13/2018 7.2 (H) 4.0 - 5.6 % Final     Comment:     ADA Screening Guidelines:  5.7-6.4%  Consistent with prediabetes  >or=6.5%  Consistent with diabetes  High levels of fetal hemoglobin interfere with the HbA1C  assay. Heterozygous hemoglobin variants (HbS, HgC, etc)do  not significantly interfere with this assay.   However, presence of multiple variants may affect accuracy.             Patient Active Problem List   Diagnosis    Essential hypertension    COPD (chronic obstructive pulmonary disease)    Hypertriglyceridemia    Tobacco abuse    Mild protein malnutrition    Diabetic neuropathy    Leg swelling    Incisional hernia without mention of obstruction or gangrene    DM type 2 without retinopathy    History of DVT (deep vein thrombosis)    History of pulmonary embolus (PE)    Bipolar 1 disorder    Gastroparesis due to DM    Thrombocytosis    Leukocytosis    Morbid obesity    Type 2 diabetes mellitus    Acne    Chronic left-sided low back pain with sciatica    Chronic left-sided low back pain without sciatica    Weakness of left lower extremity    Decreased range of motion of lumbar spine    Other chronic pain    Vomiting and diarrhea    Chronic bilateral low back pain with left-sided sciatica    Nuclear sclerosis of both eyes    Bilateral ocular hypertension    Refractive error    Chronic left-sided low back pain with left-sided sciatica    Decreased range of motion    Decreased strength, endurance, and mobility    Long term  (current) use of anticoagulants    Hypercoagulable state    History of pulmonary embolism    DVT, recurrent, lower extremity, chronic, left       Current Outpatient Medications on File Prior to Visit   Medication Sig Dispense Refill    acetaminophen (ARTHRITIS PAIN RELIEVER) 650 MG TbSR Take 650 mg by mouth. Pt states taking 2 -3 times a day      albuterol (ACCUNEB) 0.63 mg/3 mL Nebu Take 3 mLs (0.63 mg total) by nebulization every 8 (eight) hours as needed (wheezing). Rescue 1 Box 1    aspirin (ECOTRIN) 81 MG EC tablet Take 81 mg by mouth once daily.       azelastine (ASTELIN) 137 mcg (0.1 %) nasal spray 1 spray (137 mcg total) by Nasal route 2 (two) times daily. 30 mL 0    b complex vitamins tablet Take 1 tablet by mouth once daily. 90 tablet 2    carbamazepine (TEGRETOL) 200 mg tablet TK 2 TS PO BID  1    ciclopirox (LOPROX) 0.77 % Susp Apply topically once daily. 30 mL 3    ciclopirox-nail lacquer removr 8 % Kit Apply 1 application topically once a week. 1 kit 4    clotrimazole (LOTRIMIN) 1 % cream Apply topically 2 (two) times daily. 28 g 1    cyanocobalamin (VITAMIN B-12) 100 MCG tablet Take 1 tablet (100 mcg total) by mouth once daily. 90 tablet 1    DUPIXENT 300 mg/2 mL Syrg inject 300mg SUBCUTANEOUSLY every other week  6    fish oil-omega-3 fatty acids 300-1,000 mg capsule Take 2 capsules by mouth once daily. Instructed to stop 5-7 days before surgery (8/11/16). 60 capsule 5    fluticasone propionate (FLONASE ALLERGY RELIEF) 50 mcg/actuation nasal spray 1 spray (50 mcg total) by Each Nare route 2 (two) times daily. 16 g 3    furosemide (LASIX) 20 MG tablet TAKE 1 TABLET(20 MG) BY MOUTH EVERY DAY 30 tablet 2    gabapentin (NEURONTIN) 600 MG tablet Take 1 tablet (600 mg total) by mouth 2 (two) times daily. 60 tablet 5    hydrOXYzine pamoate (VISTARIL) 25 MG Cap       ibuprofen (ADVIL,MOTRIN) 600 MG tablet TAKE 1 TABLET(600 MG) BY MOUTH EVERY 8 HOURS AS NEEDED FOR PAIN 60 tablet 2     lancets Misc To check BG once daily, to use with insurance preferred meter 30 each 2    lidocaine 3 % Crea Rub on leg for pain as needed no more than 3 times a day 1 Tube 0    lisinopril (PRINIVIL,ZESTRIL) 5 MG tablet Take 1 tablet (5 mg total) by mouth once daily. 30 tablet 5    loratadine (CLARITIN) 10 mg tablet Take 1 tablet (10 mg total) by mouth once daily. 30 tablet 5    meloxicam (MOBIC) 15 MG tablet TAKE 1 TABLET(15 MG) BY MOUTH EVERY DAY 30 tablet 0    metFORMIN (GLUCOPHAGE) 1000 MG tablet TAKE 1 TABLET(1000 MG) BY MOUTH TWICE DAILY WITH MEALS 60 tablet 5    methocarbamol (ROBAXIN) 500 MG Tab TAKE 1 TABLET(500 MG) BY MOUTH EVERY 6 HOURS AS NEEDED 100 tablet 0    metoprolol tartrate (LOPRESSOR) 25 MG tablet Take 1 tablet (25 mg total) by mouth once daily. 30 tablet 5    mometasone-formoterol (DULERA) 200-5 mcg/actuation inhaler       multivitamin (THERAGRAN) per tablet Take 1 tablet by mouth every morning. 90 tablet 2    nicotine polacrilex 2 MG Lozg 1-2 per hour in place of cigarettes maximum of 20 per day. 168 lozenge 0    nystatin (MYCOSTATIN) powder Apply topically 2 (two) times daily. 60 g 2    omeprazole 20 mg TbEC TAKE 2 TABLETS BY MOUTH ONCE DAILY AT 6AM 60 each 5    omeprazole 20 mg TbEC TAKE 2 TABLETS BY MOUTH EVERY DAY AT 6 AM 60 each 5    pravastatin (PRAVACHOL) 40 MG tablet Take 1 tablet (40 mg total) by mouth once daily. 30 tablet 5    risperidone (RISPERDAL) 3 MG Tab take at bedtime  1    VENTOLIN HFA 90 mcg/actuation inhaler INHALE 2 PUFFS INTO THE LUNGS EVERY 6 HOURS AS NEEDED FOR WHEEZING 18 g 0    VYVANSE 50 mg capsule TK ONE C PO QAM  0    blood-glucose meter kit To check BG once daily, to use with insurance preferred meter 1 each 0     No current facility-administered medications on file prior to visit.        Review of patient's allergies indicates:   Allergen Reactions    Morphine Other (See Comments)     Patient had a psychotic episode after taking  "Morphine  Agitation, hallucinations    Penicillins Anaphylaxis     itching       Past Surgical History:   Procedure Laterality Date    ABDOMINAL SURGERY      BILATERAL OOPHORECTOMY Bilateral 2015    Green' s filter Right 2012    Right Neck & Tunneled Down.    HERNIA REPAIR      "[place] [facility] around th [place].", pt. states    OOPHORECTOMY      OVARIAN CYST REMOVAL  3/13/2014    [place] REMOVAL OF OVARY/TUBE(S)      SPLENECTOMY, TOTAL  2003    TONSILLECTOMY      as a child    TYMPANOSTOMY TUBE PLACEMENT  1976    VEIN SURGERY      Lt leg       Family History   Problem Relation Age of Onset    Hypertension Father     Diabetes Father     Heart disease Father     Cataracts Father     Diabetes Paternal Grandfather     Heart disease Paternal Grandfather     No Known Problems Mother     Ovarian cancer Maternal Grandmother          from this. ? age     No Known Problems Sister     No Known Problems Brother     No Known Problems Maternal Aunt     No Known Problems Maternal Uncle     No Known Problems Paternal Aunt     No Known Problems Paternal Uncle     No Known Problems Maternal Grandfather     Ovarian cancer Paternal Grandmother     Uterine cancer Neg Hx     Breast cancer Neg Hx     Colon cancer Neg Hx     Amblyopia Neg Hx     Blindness Neg Hx     Cancer Neg Hx     Glaucoma Neg Hx     Macular degeneration Neg Hx     Retinal detachment Neg Hx     Strabismus Neg Hx     Stroke Neg Hx     Thyroid disease Neg Hx        Social History     Socioeconomic History    Marital status:      Spouse name: Not on file    Number of children: Not on file    Years of education: Not on file    Highest education level: Not on file   Occupational History    Not on file   Social Needs    Financial resource strain: Not on file    Food insecurity:     Worry: Not on file     Inability: Not on file    Transportation needs:     Medical: Not on file     " "Non-medical: Not on file   Tobacco Use    Smoking status: Current Every Day Smoker     Packs/day: 0.50     Years: 25.00     Pack years: 12.50     Types: Cigarettes    Smokeless tobacco: Never Used   Substance and Sexual Activity    Alcohol use: No     Alcohol/week: 0.0 standard drinks    Drug use: No    Sexual activity: Not Currently   Lifestyle    Physical activity:     Days per week: Not on file     Minutes per session: Not on file    Stress: Not on file   Relationships    Social connections:     Talks on phone: Not on file     Gets together: Not on file     Attends Yazdanism service: Not on file     Active member of club or organization: Not on file     Attends meetings of clubs or organizations: Not on file     Relationship status: Not on file   Other Topics Concern    Not on file   Social History Narrative    Not on file       Review of Systems   Constitution: Negative for chills and fever.   Cardiovascular: Positive for leg swelling. Negative for claudication.   Respiratory: Negative for cough and shortness of breath.    Skin: Positive for dry skin and nail changes. Negative for itching and rash.   Musculoskeletal: Positive for arthritis, back pain, joint pain and myalgias. Negative for joint swelling and muscle weakness.   Gastrointestinal: Negative for diarrhea, nausea and vomiting.   Neurological: Positive for numbness and paresthesias. Negative for tremors and weakness.   Psychiatric/Behavioral: Negative for altered mental status and hallucinations.           Objective:      Vitals:    09/30/19 1115   BP: 137/77   Pulse: 110   Weight: 104.8 kg (231 lb)   Height: 5' 5" (1.651 m)   PainSc:   4       Physical Exam   Constitutional:  Non-toxic appearance. She does not have a sickly appearance. No distress.   Cardiovascular:   Pulses:       Dorsalis pedis pulses are 2+ on the right side, and 2+ on the left side.        Posterior tibial pulses are 2+ on the right side, and 2+ on the left side. "   Pulmonary/Chest: No respiratory distress.   Musculoskeletal:        Right ankle: She exhibits decreased range of motion and swelling. No tenderness. No lateral malleolus, no medial malleolus, no AITFL, no CF ligament and no posterior TFL tenderness found. Achilles tendon exhibits no pain, no defect and normal Paniagua's test results.        Left ankle: She exhibits decreased range of motion and swelling. Tenderness (anterior shin pain). No lateral malleolus, no medial malleolus, no AITFL, no CF ligament, no posterior TFL and no proximal fibula tenderness found. Achilles tendon exhibits no pain, no defect and normal Paniagua's test results.        Right foot: There is no bony tenderness.        Left foot: There is tenderness (sub 2nd-4th MTPJ) and swelling.   Biomechanical exam: There is equinus deformity bilateral with decreased dorsiflexion at the ankle joint bilateral. No tenderness with compression of heel. Negative tinels sign. Gait analysis reveals excessive pronation through midstance and propulsion with early heel off. Shoes reveals lateral heel counter wear bilateral     Decreased first MPJ range of motion both weightbearing and nonweightbearing, no crepitus observed the first MP joint, + dorsal flag sign. Mild  bunion deformity is observed .    Patient has hammertoes of digits 2-5 bilateral partially reducible without symptom today.     Neurological: She has normal reflexes. She displays no atrophy and no tremor. No sensory deficit. She exhibits normal muscle tone.   Paresthesias, and hyperesthesia bilateral feet at toes with no clearly identified trigger or source.    Woodruff-Gilberto 5.07 monofilament is intact bilateral feet. Sharp/dull sensation is also intact Bilateral feet.   Skin: Skin is warm, dry and intact. No bruising, no burn, no laceration, no lesion and no rash noted. She is not diaphoretic. No cyanosis. No pallor. Nails show no clubbing.   Examination of the skin reveals no evidence of  significant rashes, open lesions, suspicious appearing nevi or other concerning lesions.      Psychiatric: Her mood appears not anxious. Her affect is not inappropriate. Her speech is not slurred. She is not combative. She is communicative. She is attentive.   Nursing note reviewed.            Assessment:       Encounter Diagnoses   Name Primary?    Type II diabetes mellitus with neurological manifestations Yes    Hallux limitus, acquired, right     Hallux limitus, acquired, left     Hammer toes of both feet     Hallux abducto valgus, left     Inversion sprain of ankle, left, subsequent encounter     Peroneus brevis tendonitis, left     Left medial tibial stress syndrome, subsequent encounter     Comprehensive diabetic foot examination, type 2 DM, encounter for     Onychomycosis due to dermatophyte          Plan:       Audrey was seen today for foot injury and foot pain.    Diagnoses and all orders for this visit:    Type II diabetes mellitus with neurological manifestations  -     DIABETIC SHOES FOR HOME USE  -     X-Ray Foot Complete Left; Future    Hallux limitus, acquired, right  -     DIABETIC SHOES FOR HOME USE    Hallux limitus, acquired, left  -     DIABETIC SHOES FOR HOME USE    Hammer toes of both feet  -     DIABETIC SHOES FOR HOME USE    Hallux abducto valgus, left  -     DIABETIC SHOES FOR HOME USE    Inversion sprain of ankle, left, subsequent encounter  -     DIABETIC SHOES FOR HOME USE    Peroneus brevis tendonitis, left  -     DIABETIC SHOES FOR HOME USE  -     X-Ray Foot Complete Left; Future    Left medial tibial stress syndrome, subsequent encounter  -     DIABETIC SHOES FOR HOME USE  -     X-Ray Foot Complete Left; Future    Comprehensive diabetic foot examination, type 2 DM, encounter for    Onychomycosis due to dermatophyte      I counseled the patient on her conditions, their implications and medical management.    Orders Placed This Encounter    DIABETIC SHOES FOR HOME USE     X-Ray Foot Complete Left     Greater than 50% of this visit spent on counseling and coordination of care.    Education about the diabetic foot, neuropathy, and prevention of limb loss.    Shoe inspection. Diabetic Foot Education. Patient reminded of the importance of good nutrition/healthy diet/weight management and blood sugar control to help prevent podiatric complications of diabetes. Patient instructed on proper foot hygeine. Wear comfortable, proper fitting shoes. Wash feet daily. Dry well. After drying, apply moisturizer to feet (no lotion to webspaces). Inspect feet daily for skin breaks, blisters, swelling, or redness. Wear cotton socks (preferably white)  Change socks every day. Do NOT walk barefoot. Do NOT use heating pads or hot water soaks. We discussed wearing proper shoe gear, daily foot inspections, never walking without protective shoe gear.     Personally reviewed imaging of the left foot. No fractures remain, however there is arthritic change in diffuse swelling to the foot. Pain is likely all due to soft tissue injury.    Discussed foot and ankle sprains and importance of immobilization for healing as well as the lengthy course of treatment for complete resolution.    Ankle brace dispensed to n in immobilization      Patient instructed to Cut down excessive ambulation and allow your legs to rest and the injury to heal. Patient instructed on adequate icing techniques. Patient should ice the affected area at least once per day x 10 minutes for 10 days . I advised the  patient that extra icing would also be beneficial to ensure adequate anti inflammatory effect.     Discussed wearing appropriate shoe gear and avoiding flats, slippers, sandals, and going barefoot. My recommendation for OTC supports is [facility].  Advised patient on wearing compression wraps, tights during activity. Advised patient to warm up before and after exercise or increased activity. Gradually return to exercise  level; careful not to overtrain.    She will continue to monitor the areas daily, inspect his feet, wear protective shoe gear when ambulatory, moisturizer to maintain skin integrity and follow in this office in approximately 6 months, sooner p.r.n.

## 2019-10-02 ENCOUNTER — HOSPITAL ENCOUNTER (OUTPATIENT)
Dept: CARDIOLOGY | Facility: HOSPITAL | Age: 49
Discharge: HOME | End: 2019-10-02
Attending: INTERNAL MEDICINE
Payer: MEDICAID

## 2019-10-02 DIAGNOSIS — Z86.711 HISTORY OF PULMONARY EMBOLUS (PE): ICD-10-CM

## 2019-10-02 DIAGNOSIS — F31.9 BIPOLAR 1 DISORDER: ICD-10-CM

## 2019-10-02 DIAGNOSIS — M79.89 LEG SWELLING: ICD-10-CM

## 2019-10-02 PROCEDURE — 93306 TTE W/DOPPLER COMPLETE: CPT | Mod: 26,,, | Performed by: INTERNAL MEDICINE

## 2019-10-02 PROCEDURE — 93306 ECHO (CUPID ONLY): ICD-10-PCS | Mod: 26,,, | Performed by: INTERNAL MEDICINE

## 2019-10-02 PROCEDURE — 93306 TTE W/DOPPLER COMPLETE: CPT

## 2019-10-03 ENCOUNTER — CLINICAL SUPPORT (OUTPATIENT)
Dept: REHABILITATION | Facility: HOSPITAL | Age: 49
End: 2019-10-03
Attending: INTERNAL MEDICINE
Payer: MEDICAID

## 2019-10-03 DIAGNOSIS — R53.1 DECREASED STRENGTH, ENDURANCE, AND MOBILITY: ICD-10-CM

## 2019-10-03 DIAGNOSIS — G89.29 CHRONIC LEFT-SIDED LOW BACK PAIN WITH LEFT-SIDED SCIATICA: ICD-10-CM

## 2019-10-03 DIAGNOSIS — Z74.09 DECREASED STRENGTH, ENDURANCE, AND MOBILITY: ICD-10-CM

## 2019-10-03 DIAGNOSIS — R68.89 DECREASED STRENGTH, ENDURANCE, AND MOBILITY: ICD-10-CM

## 2019-10-03 DIAGNOSIS — M54.42 CHRONIC LEFT-SIDED LOW BACK PAIN WITH LEFT-SIDED SCIATICA: ICD-10-CM

## 2019-10-03 DIAGNOSIS — M25.60 DECREASED RANGE OF MOTION: ICD-10-CM

## 2019-10-03 PROCEDURE — 97110 THERAPEUTIC EXERCISES: CPT | Mod: PN | Performed by: PHYSICAL MEDICINE & REHABILITATION

## 2019-10-03 NOTE — PROGRESS NOTES
[facility]      Name: Audrey Natarajan  Clinic Number: [id number]    Therapy Diagnosis:   Encounter Diagnoses   Name Primary?    Chronic left-sided low back pain with left-sided sciatica     Decreased range of motion     Decreased strength, endurance, and mobility      Physician: Donaldo Pena MD    Visit Date: 10/3/2019     Physician Orders: [facility]   Medical Diagnosis from Referral: M54.42,G89.29 (ICD-10-CM) - Chronic bilateral low back pain with left-sided sciatica  Evaluation Date: 2019  Authorization Period Expiration: 10/30/19  Plan of Care Expiration: 10/30/19  Reassessment Due:  10/27/2019  Visit # / Visits authorized:     Time In:  1:00  pm  Time Out: 2:06  pm  Total Billable Time: 35 minutes    Precautions: Standard and Diabetes, history blood clots, cellulitis     Pattern of pain determined: 1 PEN    Subjective     Audrey reports that she has a little back pain today, 0/10 pre visit, 0/10 after visit.  She felt good after her last visit.  We are using bike and not treadmill due to foot pain.  She is being careful to move like we practiced and using good body mech at home.  She feels she is getting a little better and moving a little better.    Patient reports their pain to be 0/10 on a 0-10 scale with 0 being no pain and 10 being the worst pain imaginable.  She was not sore after her last visit  Pain Location: low back      Occupation: None  Leisure: tv, clean house    Pt goals: decrease pain with activity    Objective     Baseline IM Testing Results:   Date of testin19  ROM 0-42 deg   Max Peak Torque 135    Min Peak Torque 54    Flex/Ext Ratio 2.5:1   % below normative data -21%     Mid-term  IM Testing Results:   Date of testin2019  ROM 0-45 deg   Max Peak Torque 144 ft.lbs   Min Peak Torque 86 ft.lbs   Flex/Ext Ratio 1.6   % Above normative data 52%             MOVEMENT LOSS     ROM Loss 19  "  Flexion moderate loss Min loss   Extension minimal loss Min loss   Side bending Right minimal loss Min loss   Side bending Left minimal loss Min loss   Rotation Right moderate loss Min loss   Rotation Left moderate loss Min loss        SLS 5 sec bilat  Sit to stand with min use of hands    Outcomes:  Initial score: 55% limitation FOTO 7/1/19  Visit 5 score: 47% limitation FOTO 8/12/19  Visit 11 score 50% limited FOTO 9/30/19  Goal: 45% limitation FOTO         Treatment    Pt was instructed in and performed the following:     Audrey received therapeutic exercises to develop/improved posture, cardiovascular endurance, muscular endurance, lumbar/  ROM, strength and muscular endurance for 50 minutes including the following exercises:     Flexion in lie with ball, slight reduction of back pain 10 reps  LTR x2'  Open book 2x10   Bridges 3x10   EIS 10x10" holds, reduced back pain    Lifting floor to waist empty box with c[facility]  Simulation dishes and empty     [facility] 10/3/2019   Visit Number 12   VAS Pain Rating 0   Treadmill Time (in min.) -   Speed -   Incline -   Recumbent Bike Seat Pos. 1   Time 10   Extension in Standing 10   Flexion in Lying 10   Manual Therapy -   Lumbar Extension Seat Pad -   Femur Restraint -   Top Dead Center -   Counterweight -   Lumbar Flexion -   Lumbar Extension -   Lumbar Peak Torque -   Min Torque -   Test Percent Below Normative Data -   Test Percent Gain in Strength from Initial  -   Lumbar Weight 70   Repetitions 20   Rating of Perceived Exertion 4   Ice - Z Lie (in min.) 10       Peripheral muscle strengthening which included 1 set of 15-20 repetitions at a slow, controlled 10-13 second per rep pace focused on strengthening supporting musculature for improved body mechanics and functional mobility.  Pt and therapist focused on proper form during treatment to ensure optimal strengthening of each targeted muscle group.  Machines were utilized " including torso rotation, leg extension, leg curl, chest press, upright row. Tricep extension, bicep curl,hip add and hip abduction.        Home Exercises Provided and Patient Education Provided   Flexion in lie  Lower trunk rotation  Ext in standing  Z lie for back and left foot rest    Education provided:   - reasoning behind extension-based exercises    Written Home Exercises Provided: Patient instructed to cont prior HEP.  Exercises were reviewed and Audrey was able to demonstrate them prior to the end of the session.  Audrey demonstrated good  understanding of the education provided.     See EMR under Patient Instructions for exercises provided prior visit.    Assessment      Patient has attended 12 visits at [facility]  And demonstrates FOTO score improvement for function, as well as reduced pain in back and improved strength.   She has had recent health issues and her left foot diabetis and old fracture are causing some pain.  We will use bike and monitor her foot during sessions.  Overall, pt is tolerating HB program well. Cont skilled PT services to decrease functional limitations.    Continue progressing patient per [facility] protocol.      Patient is making fair progress towards established goals.  Pt will continue to benefit from skilled outpatient physical therapy to address the deficits stated in the impairment chart, provide pt/family education and to maximize pt's level of independence in the home and community environment.     Anticipated Barriers for therapy: compliance   Pt's spiritual, cultural and educational needs considered and pt agreeable to plan of care and goals as stated below:     Goals:   Short term goals:  5 weeks or 10 visits   1.  Pt will demonstrate increased lumbar ROM by at least 3 degrees from the initial ROM value with improvements noted in functional ROM and ability to perform ADLs. - Goal met 9-  2.  Pt will demonstrate increased maximum isometric torque  value by 25% when compared to the initial value resulting in improved ability to perform bending, lifting, and carrying activities safely, confidently. - Goal not met 9-  3.  Patient report a reduction in worst pain score by 1-2 points for improved tolerance for walking and household chores. - Goal not met 9-  4.  Pt able to perform HEP correctly with minimal cueing or supervision from therapist to encourage independent management of symptoms. - Goal met 9-     Long term goals: 10 weeks or 20 visits   1. Pt will demonstrate increased lumbar ROM by at least 6 degrees from initial ROM value, resulting in improved ability to perform functional fwd bending while standing and sitting.   2. Pt will demonstrate increased maximum isometric torque value by 50% when compared to the initial value resulting in improved ability to perform bending, lifting, and carrying activities safely, confidently.  3. Pt to demonstrate ability to independently control and reduce their pain through posture positioning and mechanical movements throughout a typical day.  4.  Pt will demonstrate reduced pain and improved functional outcomes as reported on the FOTO by reaching a score of CJ = at least 20% but < 40% impaired, limited or restricted or less in order to demonstrate subjective improvement in pt's condition.    5. Pt will demonstrate independence with the HEP at discharge  6.  Pt will be able to perform daily household chores with little to no low back pain.     Plan     Plan of Care Certification period: 9/3019-10/30/19    Continue with 2x/wk. Continue with established Plan of Care towards established PT goals. Progress per [facility] protocol as tolerated.        Conchis Cabral, PT  10/3/2019

## 2019-10-05 LAB
AORTIC ROOT ANNULUS: 2.58 CM
AORTIC VALVE CUSP SEPERATION: 1.92 CM
ASCENDING AORTA: 2.68 CM
AV INDEX (PROSTH): 0.74
AV MEAN GRADIENT: 7 MMHG
AV PEAK GRADIENT: 12 MMHG
AV VALVE AREA: 2.67 CM2
AV VELOCITY RATIO: 0.78
CV ECHO LV RWT: 0.31 CM
DOP CALC AO PEAK VEL: 1.7 M/S
DOP CALC AO VTI: 34.21 CM
DOP CALC LVOT AREA: 3.6 CM2
DOP CALC LVOT DIAMETER: 2.15 CM
DOP CALC LVOT PEAK VEL: 1.33 M/S
DOP CALC LVOT STROKE VOLUME: 91.44 CM3
DOP CALCLVOT PEAK VEL VTI: 25.2 CM
E WAVE DECELERATION TIME: 91.01 MSEC
E/A RATIO: 1.09
E/E' RATIO: 10.86 M/S
ECHO LV POSTERIOR WALL: 0.81 CM (ref 0.6–1.1)
FRACTIONAL SHORTENING: 27 % (ref 28–44)
INTERVENTRICULAR SEPTUM: 1.02 CM (ref 0.6–1.1)
IVRT: 0.08 MSEC
LA MAJOR: 5.29 CM
LA MINOR: 5.92 CM
LA WIDTH: 4.3 CM
LEFT ATRIUM SIZE: 4.85 CM
LEFT ATRIUM VOLUME: 99.04 CM3
LEFT INTERNAL DIMENSION IN SYSTOLE: 3.84 CM (ref 2.1–4)
LEFT VENTRICLE DIASTOLIC VOLUME: 134.57 ML
LEFT VENTRICLE SYSTOLIC VOLUME: 63.56 ML
LEFT VENTRICULAR INTERNAL DIMENSION IN DIASTOLE: 5.29 CM (ref 3.5–6)
LEFT VENTRICULAR MASS: 177.74 G
LV LATERAL E/E' RATIO: 14.25 M/S
LV SEPTAL E/E' RATIO: 8.77 M/S
MV PEAK A VEL: 1.05 M/S
MV PEAK E VEL: 1.14 M/S
PISA TR MAX VEL: 2.43 M/S
PULM VEIN S/D RATIO: 1.04
PV PEAK D VEL: 0.56 M/S
PV PEAK S VEL: 0.58 M/S
PV PEAK VELOCITY: 1.16 CM/S
RA MAJOR: 5.01 CM
RA PRESSURE: 3 MMHG
RA WIDTH: 4.19 CM
RIGHT VENTRICULAR END-DIASTOLIC DIMENSION: 3.66 CM
RV TISSUE DOPPLER FREE WALL SYSTOLIC VELOCITY 1 (APICAL 4 CHAMBER VIEW): 17.46 CM/S
SINUS: 3.01 CM
STJ: 2.54 CM
TDI LATERAL: 0.08 M/S
TDI SEPTAL: 0.13 M/S
TDI: 0.11 M/S
TR MAX PG: 24 MMHG
TRICUSPID ANNULAR PLANE SYSTOLIC EXCURSION: 2.92 CM
TV REST PULMONARY ARTERY PRESSURE: 27 MMHG

## 2019-10-07 ENCOUNTER — CLINICAL SUPPORT (OUTPATIENT)
Dept: REHABILITATION | Facility: HOSPITAL | Age: 49
End: 2019-10-07
Attending: INTERNAL MEDICINE
Payer: MEDICAID

## 2019-10-07 DIAGNOSIS — R68.89 DECREASED STRENGTH, ENDURANCE, AND MOBILITY: ICD-10-CM

## 2019-10-07 DIAGNOSIS — G89.29 CHRONIC LEFT-SIDED LOW BACK PAIN WITH LEFT-SIDED SCIATICA: ICD-10-CM

## 2019-10-07 DIAGNOSIS — Z74.09 DECREASED STRENGTH, ENDURANCE, AND MOBILITY: ICD-10-CM

## 2019-10-07 DIAGNOSIS — M54.42 CHRONIC LEFT-SIDED LOW BACK PAIN WITH LEFT-SIDED SCIATICA: ICD-10-CM

## 2019-10-07 DIAGNOSIS — M25.60 DECREASED RANGE OF MOTION: ICD-10-CM

## 2019-10-07 DIAGNOSIS — R53.1 DECREASED STRENGTH, ENDURANCE, AND MOBILITY: ICD-10-CM

## 2019-10-07 PROCEDURE — 97110 THERAPEUTIC EXERCISES: CPT | Mod: PN | Performed by: PHYSICAL MEDICINE & REHABILITATION

## 2019-10-07 NOTE — PROGRESS NOTES
[facility]      Name: Audrey Natarajan  Clinic Number: [id number]    Therapy Diagnosis:   Encounter Diagnoses   Name Primary?    Chronic left-sided low back pain with left-sided sciatica     Decreased range of motion     Decreased strength, endurance, and mobility      Physician: Donaldo Pena MD    Visit Date: 10/7/2019     Phys[facility]   Medical Diagnosis from Referral: M54.42,G89.29 (ICD-10-CM) - Chronic bilateral low back pain with left-sided sciatica  Evaluation Date: 2019  Authorization Period Expiration: 10/30/19  Plan of Care Expiration: 10/30/19  Reassessment Due:  10/27/2019  Visit # / Visits authorized:     Time In:  1:00  pm  Time Out: 2:06  pm  Total Billable Time: 35 minutes    Precautions: Standard and Diabetes, history blood clots, cellulitis     Pattern of pain determined: 1 PEN    Subjective     Audrey reports that she has a little back pain today, 2/10 pre visit, 0/10 after visit.  She felt good after her last visit.  We are using bike and not treadmill due to foot pain.  She is being careful to move like we practiced and using good body mech at home.  She feels she is getting a little better and moving a little better.  She notices less pain looking after her grandchild and with cleaning at home.    Patient reports their pain to be 0/10 on a 0-10 scale with 0 being no pain and 10 being the worst pain imaginable.  She was not sore after her last visit  Pain Location: low back      Occupation: None  Leisure: tv, clean house    Pt goals: decrease pain with activity    Objective     Baseline IM Testing Results:   Date of testin19  ROM 0-42 deg   Max Peak Torque 135    Min Peak Torque 54    Flex/Ext Ratio 2.5:1   % below normative data -21%     Mid-term  IM Testing Results:   Date of testin2019  ROM 0-45 deg   Max Peak Torque 144 ft.lbs   Min Peak Torque 86 ft.lbs   Flex/Ext Ratio 1.6  "  % Above normative data 52%             MOVEMENT LOSS     ROM Loss 9/30/19   Flexion moderate loss Min loss   Extension minimal loss Min loss   Side bending Right minimal loss Min loss   Side bending Left minimal loss Min loss   Rotation Right moderate loss Min loss   Rotation Left moderate loss Min loss        SLS 5 sec bilat  Sit to stand with min use of hands    Outcomes:  Initial score: 55% limitation FOTO 7/1/19  Visit 5 score: 47% limitation FOTO 8/12/19  Visit 11 score 50% limited FOTO 9/30/19  Goal: 45% limitation FOTO         Treatment    Pt was instructed in and performed the following:     Audrey received therapeutic exercises to develop/improved posture, cardiovascular endurance, muscular endurance, lumbar/  ROM, strength and muscular endurance for 50 minutes including the following exercises:     Flexion in lie with ball, slight reduction of back pain 10 reps  LTR x2'  Open book 2x10   Bridges 3x10   EIS 10x10" holds, reduced back pain    Lifting floor to waist empty box with cuing needed for body mech  Simulation dishes and empty     Core work:  Engaging TA and SLR  10 bilat    [facility] 10/7/2019   Visit Number 13   VAS Pain Rating 2   Treadmill Time (in min.) -   Speed -   Incline -   Recumbent Bike Seat Pos. 1   Time 10   Extension in Standing 10   Flexion in Lying 10   Manual Therapy -   Lumbar Extension Seat Pad -   Femur Restraint -   Top Dead Center -   Counterweight -   Lumbar Flexion 48   Lumbar Extension 0   Lumbar Peak Torque -   Min Torque -   Test Percent Below Normative Data -   Test Percent Gain in Strength from Initial  -   Lumbar Weight 72   Repetitions 20   Rating of Perceived Exertion 4   Ice - Z Lie (in min.) 10         Peripheral muscle strengthening which included 1 set of 15-20 repetitions at a slow, controlled 10-13 second per rep pace focused on strengthening supporting musculature for improved body mechanics and functional mobility.  Pt and therapist " focused on proper form during treatment to ensure optimal strengthening of each targeted muscle group.  Machines were utilized including torso rotation, leg extension, leg curl, chest press, upright row. Tricep extension, bicep curl,hip add and hip abduction.        Home Exercises Provided and Patient Education Provided   Flexion in lie  Lower trunk rotation  Ext in standing  Open books  TA engagement and SLR   Z lie for back and left foot rest    Education provided:   - reasoning behind extension-based exercises    Written Home Exercises Provided: Patient instructed to cont prior HEP.  Exercises were reviewed and Audrey was able to demonstrate them prior to the end of the session.  Audrey demonstrated good  understanding of the education provided.     See EMR under Patient Instructions for exercises provided 10/7/19    Assessment      Patient has attended 13 visits and notices improvements in function.  Med X range increased to 0-48 and she has progressively been increasing strength and tolerated 72 ft lbs today.    FOTO score improvement for function, as well as reduced pain in back and improved strength.   She has had recent health issues and her left foot diabetis and old fracture are causing some pain.  We will use bike and monitor her foot during sessions.  Overall, pt is tolerating HB program well. Cont skilled PT services to decrease functional limitations.    Continue progressing patient per [facility] protocol.      Patient is making fair progress towards established goals.  Pt will continue to benefit from skilled outpatient physical therapy to address the deficits stated in the impairment chart, provide pt/family education and to maximize pt's level of independence in the home and community environment.     Anticipated Barriers for therapy: compliance   Pt's spiritual, cultural and educational needs considered and pt agreeable to plan of care and goals as stated below:     Goals:   Short term goals:  5  weeks or 10 visits   1.  Pt will demonstrate increased lumbar ROM by at least 3 degrees from the initial ROM value with improvements noted in functional ROM and ability to perform ADLs. - Goal met 9-  2.  Pt will demonstrate increased maximum isometric torque value by 25% when compared to the initial value resulting in improved ability to perform bending, lifting, and carrying activities safely, confidently. - Goal not met 9-  3.  Patient report a reduction in worst pain score by 1-2 points for improved tolerance for walking and household chores. - Goal not met 9-  4.  Pt able to perform HEP correctly with minimal cueing or supervision from therapist to encourage independent management of symptoms. - Goal met 9-     Long term goals: 10 weeks or 20 visits   1. Pt will demonstrate increased lumbar ROM by at least 6 degrees from initial ROM value, resulting in improved ability to perform functional fwd bending while standing and sitting.   2. Pt will demonstrate increased maximum isometric torque value by 50% when compared to the initial value resulting in improved ability to perform bending, lifting, and carrying activities safely, confidently.  3. Pt to demonstrate ability to independently control and reduce their pain through posture positioning and mechanical movements throughout a typical day.  4.  Pt will demonstrate reduced pain and improved functional outcomes as reported on the FOTO by reaching a score of CJ = at least 20% but < 40% impaired, limited or restricted or less in order to demonstrate subjective improvement in pt's condition.    5. Pt will demonstrate independence with the HEP at discharge  6.  Pt will be able to perform daily household chores with little to no low back pain.     Plan     Plan of Care Certification period: 9/3019-10/30/19    Continue with 2x/wk. Continue with established Plan of Care towards established PT goals. Progress per [facility] protocol as  tolerated.        Conchis Cabral, PT  10/7/2019

## 2019-10-07 NOTE — PATIENT INSTRUCTIONS
Copyright © 0664-1440 [employer]       1. Exercises  A. Supine Knee-to-Chest, Bilateral  With ball    Lie on back, hands clasped behind both knees. Pull knees in toward chest until a comfortable stretch is felt in lower back and buttocks. Hold 3 seconds.  Repeat 10 times per session. Do 2 sessions per day.        E. Pelvic Tilt    lying supine, flatten back by tightening stomach muscles and rolling pelvis back.   Your back will flatten, your buttock will lift up.   Do not hold your breath.  Hold 3 seconds.  Repeat 10 times per set. Do 1 sets per session. Do 2 sessions per day.    [email or web address]                 Positioning - Z LIE  Decompression Exercise: Leg Support.  Can be done on floor or bed with legs on couch, therapy ball, chair, or auto man        Lie on back on firm surface arms turned up, out to sides, backs of hands down. Support under legs: 90/90 position. Time 10 minutes.  Surface: floor      Copyright © [facility]. All rights reserved.         Backward Bend (Standing)        Arch backward to make hollow of back deeper. Hold ____ seconds.  Repeat ____ times per set. Do ____ sets per session. Do ____ sessions per day.     [email or web address]         Back Exercises: Lower Back Rotation    To start, lie on your back with your knees bent and feet flat on the floor. Dont press your neck or lower back to the floor. Breathe deeply. You should feel comfortable and relaxed in this position.  · Drop both knees to one side.  Keep your shoulders and head in neutral.   Keep your shoulders flat on the floor.  · Do not push through pain.  · Hold for 2-5 seconds.  · Return to the middle and then slowly switch sides.  · Repeat 5-10 times.  Date Last Reviewed: 10/11/2015  © 5112-3862 [employer]. [address] [place] [address], [place] [address]. All rights reserved. This information is not intended as a substitute for professional medical care. Always follow your healthcare professional's  instructions.          Thoracic Rotation - Open Book      To start, lie on your side with your knees bent.   Keep your arms out in front of you, head relaxed on pillow.   Allow your top arm to reach up towards the ceiling and then behind you, follow with your head, but keep head on pillow.   Breathe deeply. You should feel comfortable and relaxed in this position.  · Do not push through pain.  · Hold for 2-5 seconds.  · Return to the starting position, then repeat.    · Repeat 5-10 times.  · Switch sides    Powered by [email or web address]          Powered by [email or web address]              STRAIGHT LEG RAISE with bracing

## 2019-10-10 ENCOUNTER — OFFICE VISIT (OUTPATIENT)
Dept: URGENT CARE | Facility: CLINIC | Age: 49
End: 2019-10-10
Payer: MEDICAID

## 2019-10-10 VITALS
OXYGEN SATURATION: 96 % | RESPIRATION RATE: 18 BRPM | WEIGHT: 231 LBS | HEART RATE: 86 BPM | TEMPERATURE: 98 F | SYSTOLIC BLOOD PRESSURE: 137 MMHG | HEIGHT: 65 IN | DIASTOLIC BLOOD PRESSURE: 84 MMHG | BODY MASS INDEX: 38.49 KG/M2

## 2019-10-10 DIAGNOSIS — R11.2 NON-INTRACTABLE VOMITING WITH NAUSEA, UNSPECIFIED VOMITING TYPE: ICD-10-CM

## 2019-10-10 DIAGNOSIS — J44.1 ACUTE EXACERBATION OF CHRONIC OBSTRUCTIVE PULMONARY DISEASE (COPD): ICD-10-CM

## 2019-10-10 DIAGNOSIS — R05.9 COUGH: Primary | ICD-10-CM

## 2019-10-10 LAB
BILIRUB UR QL STRIP: NEGATIVE
GLUCOSE SERPL-MCNC: 84 MG/DL (ref 70–110)
GLUCOSE SERPL-MCNC: 94 MG/DL (ref 70–110)
GLUCOSE UR QL STRIP: NEGATIVE
KETONES UR QL STRIP: NEGATIVE
LEUKOCYTE ESTERASE UR QL STRIP: NEGATIVE
PH, POC UA: 6.5 (ref 5–8)
POC ANION GAP: 17 MMOL/L (ref 10–20)
POC BLOOD, URINE: NEGATIVE
POC BUN: 6 MMOL/L (ref 8–26)
POC CHLORIDE: 100 MMOL/L (ref 98–109)
POC CREATININE: 0.5 MG/DL (ref 0.6–1.3)
POC HEMATOCRIT: 40 %PCV (ref 37–47)
POC HEMOGLOBIN: 13.6 G/DL (ref 12.5–16)
POC ICA: 1 MMOL/L (ref 1.12–1.32)
POC NITRATES, URINE: NEGATIVE
POC POTASSIUM: 4.4 MMOL/L (ref 3.5–4.9)
POC SODIUM: 137 MMOL/L (ref 138–146)
POC TCO2: 26 MMOL/L (ref 24–29)
PROT UR QL STRIP: POSITIVE
SP GR UR STRIP: 1.01 (ref 1–1.03)
UROBILINOGEN UR STRIP-ACNC: ABNORMAL (ref 0.1–1.1)

## 2019-10-10 PROCEDURE — 99214 PR OFFICE/OUTPT VISIT, EST, LEVL IV, 30-39 MIN: ICD-10-PCS | Mod: 25,S$GLB,, | Performed by: PHYSICIAN ASSISTANT

## 2019-10-10 PROCEDURE — 80047 POCT CHEMISTRY PANEL: ICD-10-PCS | Mod: QW,S$GLB,, | Performed by: PHYSICIAN ASSISTANT

## 2019-10-10 PROCEDURE — 81003 POCT URINALYSIS, DIPSTICK, MANUAL, W/O SCOPE: ICD-10-PCS | Mod: QW,S$GLB,, | Performed by: PHYSICIAN ASSISTANT

## 2019-10-10 PROCEDURE — 82962 POCT GLUCOSE, HAND-HELD DEVICE: ICD-10-PCS | Mod: S$GLB,,, | Performed by: PHYSICIAN ASSISTANT

## 2019-10-10 PROCEDURE — 82962 GLUCOSE BLOOD TEST: CPT | Mod: S$GLB,,, | Performed by: PHYSICIAN ASSISTANT

## 2019-10-10 PROCEDURE — 81003 URINALYSIS AUTO W/O SCOPE: CPT | Mod: QW,S$GLB,, | Performed by: PHYSICIAN ASSISTANT

## 2019-10-10 PROCEDURE — 71046 X-RAY EXAM CHEST 2 VIEWS: CPT | Mod: S$GLB,,, | Performed by: RADIOLOGY

## 2019-10-10 PROCEDURE — 80047 BASIC METABLC PNL IONIZED CA: CPT | Mod: QW,S$GLB,, | Performed by: PHYSICIAN ASSISTANT

## 2019-10-10 PROCEDURE — 71046 XR CHEST PA AND LATERAL: ICD-10-PCS | Mod: S$GLB,,, | Performed by: RADIOLOGY

## 2019-10-10 PROCEDURE — 99214 OFFICE O/P EST MOD 30 MIN: CPT | Mod: 25,S$GLB,, | Performed by: PHYSICIAN ASSISTANT

## 2019-10-10 RX ORDER — ONDANSETRON 4 MG/1
4 TABLET, FILM COATED ORAL EVERY 8 HOURS PRN
Qty: 30 TABLET | Refills: 0 | Status: SHIPPED | OUTPATIENT
Start: 2019-10-10 | End: 2020-10-15

## 2019-10-10 RX ORDER — DOXYCYCLINE 100 MG/1
100 CAPSULE ORAL EVERY 12 HOURS
Qty: 14 CAPSULE | Refills: 0 | Status: SHIPPED | OUTPATIENT
Start: 2019-10-10 | End: 2019-10-17

## 2019-10-10 RX ORDER — ONDANSETRON 2 MG/ML
4 INJECTION INTRAMUSCULAR; INTRAVENOUS
Status: COMPLETED | OUTPATIENT
Start: 2019-10-10 | End: 2019-10-10

## 2019-10-10 RX ADMIN — ONDANSETRON 4 MG: 2 INJECTION INTRAMUSCULAR; INTRAVENOUS at 04:10

## 2019-10-10 NOTE — PROGRESS NOTES
"Subjective:       Patient ID: Audrey Natarajan is a 49 y.o. female.    Vitals:  height is 5' 5" (1.651 m) and weight is 104.8 kg (231 lb). Her temperature is 98.4 °F (36.9 °C). Her blood pressure is 137/84 and her pulse is 86. Her respiration is 18 and oxygen saturation is 96%.     Chief Complaint: URI    49-year-old female with history of diabetes, COPD multiple comorbidities, reports nausea, vomiting and diarrhea since yesterday.  Improved today.  No blood in stool or vomit.. States fever since yesterday, took tylenol to help. No current fever.   Sinus pain for 3 weeks, congestion and pressure, coughing, sometimes clear sometimes yellow. No abdominal pain. No urinary symptoms.        Sinus Problem   This is a new problem. The current episode started 1 to 4 weeks ago. The problem has been gradually worsening since onset. The maximum temperature recorded prior to her arrival was 100.4 - 100.9 F. The fever has been present for 1 to 2 days. Her pain is at a severity of 7/10. The pain is moderate. Associated symptoms include congestion, coughing, ear pain and sinus pressure. Pertinent negatives include no chills, diaphoresis, shortness of breath or sore throat. Past treatments include acetaminophen. The treatment provided mild relief.   URI    This is a new problem. The current episode started yesterday. The problem has been gradually worsening. The maximum temperature recorded prior to her arrival was 100.4 - 100.9 F. Associated symptoms include congestion, coughing, diarrhea, ear pain, nausea, sinus pain and vomiting. Pertinent negatives include no rash, sore throat or wheezing. She has tried acetaminophen for the symptoms. The treatment provided mild relief.   Emesis    This is a new problem. The current episode started yesterday. The problem occurs 5 to 10 times per day. The problem has been gradually worsening. The emesis has an appearance of stomach contents. The maximum temperature recorded prior to her " arrival was 100.4 - 100.9 F. The fever has been present for less than 1 day. Associated symptoms include coughing, diarrhea, a fever, sweats and URI. Pertinent negatives include no chills or myalgias.       Constitution: Positive for fever. Negative for chills, sweating and fatigue.   HENT: Positive for ear pain, congestion, sinus pain and sinus pressure. Negative for sore throat and voice change.    Neck: Negative for painful lymph nodes.   Eyes: Negative for eye redness.   Respiratory: Positive for cough and sputum production. Negative for chest tightness, bloody sputum, COPD, shortness of breath, stridor, wheezing and asthma.    Gastrointestinal: Positive for nausea, vomiting and diarrhea.   Musculoskeletal: Negative for muscle ache.   Skin: Negative for rash.   Allergic/Immunologic: Negative for seasonal allergies and asthma.   Hematologic/Lymphatic: Negative for swollen lymph nodes.       Objective:      Physical Exam   Constitutional: She is oriented to person, place, and time. Vital signs are normal. She appears well-developed and well-nourished. She is cooperative.  Non-toxic appearance. She does not have a sickly appearance. She does not appear ill. No distress.   Appears well.  Nontoxic, nondistressed.  Sitting up in the chair conversing, answering questions, smiling.  No vomiting during entire urgent care stay.   HENT:   Head: Normocephalic and atraumatic.   Right Ear: Hearing, tympanic membrane, external ear and ear canal normal.   Left Ear: Hearing, tympanic membrane, external ear and ear canal normal.   Nose: Mucosal edema and rhinorrhea present. No nasal deformity. No epistaxis. Right sinus exhibits maxillary sinus tenderness. Right sinus exhibits no frontal sinus tenderness. Left sinus exhibits maxillary sinus tenderness. Left sinus exhibits no frontal sinus tenderness.   Mouth/Throat: Uvula is midline, oropharynx is clear and moist and mucous membranes are normal. No trismus in the jaw. Normal  dentition. No uvula swelling. No oropharyngeal exudate, posterior oropharyngeal edema or posterior oropharyngeal erythema.   Eyes: Conjunctivae and lids are normal. No scleral icterus.   Neck: Trachea normal, full passive range of motion without pain and phonation normal. Neck supple. No neck rigidity. No edema and no erythema present.   Cardiovascular: Normal rate, regular rhythm, normal heart sounds, intact distal pulses and normal pulses.   Pulmonary/Chest: Effort normal. No stridor. No respiratory distress. She has no decreased breath sounds. She has no wheezes. She has no rales.   Course lung sounds throughout all lung fields   Abdominal: Soft. Normal appearance and bowel sounds are normal. She exhibits no distension, no abdominal bruit, no pulsatile midline mass and no mass.   Very Mild abdominal tenderness to deep palpation across the lower abdomen.  No rebound, guarding, rigidity, distension.  No acute abdomen   Musculoskeletal: Normal range of motion. She exhibits no edema or deformity.   Neurological: She is alert and oriented to person, place, and time. She has normal strength. She exhibits normal muscle tone. Coordination normal.   Skin: Skin is warm, dry, intact, not diaphoretic and not pale.   Psychiatric: She has a normal mood and affect. Her speech is normal and behavior is normal. Judgment and thought content normal. Cognition and memory are normal.   Nursing note and vitals reviewed.      X-ray Chest Pa And Lateral    Result Date: 10/10/2019  EXAMINATION: XR CHEST PA AND LATERAL CLINICAL HISTORY: Cough TECHNIQUE: PA and lateral views of the chest were performed. COMPARISON: February 2018. FINDINGS: Postop change left upper quadrant.  Heart size and pulmonary vessels are similar.  No confluent consolidation.  The no significant pleural fluid.     No significant detrimental change.  No confluent consolidation. Electronically signed by: Teo Gomez MD Date:    10/10/2019  Time:    16:37        Results for orders placed or performed in visit on 10/10/19   POCT Glucose, Hand-Held Device   Result Value Ref Range    POC Glucose 84 70 - 110 MG/DL   POCT Urinalysis, Dipstick, Manual, W/O Scope   Result Value Ref Range    POC Blood, Urine Negative Negative    POC Bilirubin, Urine Negative Negative    POC Urobilinogen, Urine norm 0.1 - 1.1    POC Ketones, Urine Negative Negative    POC Protein, Urine Positive (A) Negative    POC Nitrates, Urine Negative Negative    POC Glucose, Urine Negative Negative    pH, UA 6.5 5 - 8    POC Specific Gravity, Urine 1.015 1.003 - 1.029    POC Leukocytes, Urine Negative Negative   POCT Chemistry Panel   Result Value Ref Range    POC Sodium 137 (A) 138 - 146 MMOL/L    POC Potassium 4.4 3.5 - 4.9 MMOL/L    POC Chloride 100 98 - 109 MMOL/L    POC BUN 6 (A) 8 - 26 MMOL/L    POC Glucose 94 70 - 110 MG/DL    POC Creatinine 0.5 (A) 0.6 - 1.3 mg/dL    POC iCA 1.00 (A) 1.12 - 1.32 MMOL/L    POC TCO2 26 24 - 29 MMOL/L    POC Hematocrit 40 37 - 47 %PCV    POC Hemoglobin 13.6 12.5 - 16 g/dL    POC Anion Gap 17 10.0 - 20 MMOL/L        Vitals:    10/10/19 1530   BP: 137/84   Pulse: 86   Resp: 18   Temp: 98.4 °F (36.9 °C)           Assessment:       1. Cough    2. Non-intractable vomiting with nausea, unspecified vomiting type    3. Acute exacerbation of chronic obstructive pulmonary disease (COPD)        Plan:       Glucose, UA, Chem 8 and a chest x-ray.  All within normal limits.  Likely viral as symptoms are improving and abdomen is only mildly tender, vital signs stable, patient appears nontoxic.  Discussed options with patient.  Will send home with Zofran and strict monitoring of symptoms.  Strict ED precautions should any symptoms change or worsen or become concerning.  Patient is happy with this plan. Strict follow-up with primary care on Monday    Cough  -     X-Ray Chest PA And Lateral; Future; Expected date: 10/10/2019    Non-intractable vomiting with nausea,  unspecified vomiting type  -     POCT Glucose, Hand-Held Device  -     POCT Urinalysis, Dipstick, Manual, W/O Scope  -     ondansetron injection 4 mg  -     POCT Chemistry Panel  -     ondansetron (ZOFRAN) 4 MG tablet; Take 1 tablet (4 mg total) by mouth every 8 (eight) hours as needed for Nausea.  Dispense: 30 tablet; Refill: 0    Acute exacerbation of chronic obstructive pulmonary disease (COPD)  -     doxycycline (VIBRAMYCIN) 100 MG Cap; Take 1 capsule (100 mg total) by mouth every 12 (twelve) hours. for 7 days  Dispense: 14 capsule; Refill: 0          Patient Instructions   If symptoms do not improve by tomorrow please go to the emergency department.  If symptoms worsen, change, or fail to improve or become worsening or more severe please go to the emergency department  Follow-up with primary care Monday    Please return here or go to the Emergency Department for any concerns or worsening of condition.  If you were prescribed antibiotics, please take them to completion.  If you were prescribed a narcotic medication, do not drive or operate heavy equipment or machinery while taking these medications.  Please follow up with your primary care doctor or specialist as needed.    If you  smoke, please stop smoking.              COPD Flare    You have had a flare-up of your COPD.  COPD, or chronic obstructive pulmonary disease, is a common lung disease. It causes your airways to become irritated and narrower. This makes it harder for you to breathe. Emphysema and chronic bronchitis are both types of COPD. This is a chronic condition, which means you always have it. Sometimes it gets worse. When this happens, it is called a flare-up.  Symptoms of COPD  People with COPD may have symptoms most of the time. In a flare-up, your symptoms get worse. These symptoms may mean you are having a flare-up:  · Shortness of breath, shallow or rapid breathing, or wheezing that gets worse  · Lung infection  · Cough that gets  worse  · More mucus, thicker mucus or mucus of a different color  · Tiredness, decreased energy, or trouble doing your usual activities  · Fever  · Chest tightness  · Your symptoms dont get better even when you use your usual medicines, inhalers, and nebulizer  · Trouble talking  · You feel confused  Causes of flare-ups  Unfortunately, a flare-up can happen even though you did everything right, and you followed your doctors instructions. Some causes of flare-ups are:  · Smoking or secondhand smoke  · Colds, the flu, or respiratory infections  · Air pollution  · Sudden change in the weather  · Dust, irritating chemicals, or strong fumes  · Not taking your medicines as prescribed  Home care  Here are some things you can do at home to treat a flare-up:  · Try not to panic. This makes it harder to breathe, and keeps you from doing the right things.  · Dont smoke or be around others who are smoking.  · Try to drink more fluids than usual during a flare-up, unless your doctor has told you not to because of heart and kidney problems. More fluids can help loosen the mucus.  · Use your inhalers and nebulizer, if you have one, as you have been told to.  · If you were given antibiotics, take them until they are used up or your doctor tells you to stop. Its important to finish the antibiotics, even though you feel better. This will make sure the infection has cleared.  · If you were given prednisone or another steroid, finish it even if you feel better.  Preventing a flare-up  Even though flare-ups happen, the best way to treat one is to prevent it before it starts. Here are some pointers:  · Dont smoke or be around others who are smoking.  · Take your medicines as you have been told.  · Talk with your doctor about getting a flu shot every year. Also find out if you need a pneumonia shot.  · If there is a weather advisory warning to stay indoors, try to stay inside when possible.  · Try to eat healthy and get plenty of  "sleep.  · Try to avoid things that usually set you off, like dust, chemical fumes, hairsprays, or strong perfumes.  Follow-up care  Follow up with your healthcare provider, or as advised.  If a culture was done, you will be told if your treatment needs to be changed. You can call as directed for the results.  If X-rays were done, you will be notified of any new findings that may affect your care.  Call 911  Call 911 if any of these occur:  · You have trouble breathing  · You feel confused or its difficult to wake you up  · You faint or lose consciousness  · You have a rapid heart rate  · You have new pain in your chest, arm, shoulder, neck or upper back  When to seek medical advice  Call your healthcare provider right away if any of these occur:  · Wheezing or shortness of breath gets worse  · You need to use your inhalers more often than usual without relief  · Fever of 100.4°F (38ºC) or higher, or as directed by your healthcare provider  · Coughing up lots of dark-colored or bloody mucus (sputum)  · Chest pain with each breath  · You do not start to get better within 24 hours  · Swelling of your ankles gets worse  · Dizziness or weakness  Date Last Reviewed: 9/1/2016  © 2520-3826 [employer]. [address] [place] [address], [place] [address]. All rights reserved. This information is not intended as a substitute for professional medical care. Always follow your healthcare professional's instructions.            Viral Syndrome (Adult)  A viral illness may cause a number of symptoms. The symptoms depend on the part of the body that the virus affects. If it settles in your nose, throat, and lungs, it may cause cough, sore throat, congestion, and sometimes headache. If it settles in your stomach and intestinal tract, it may cause vomiting and diarrhea. Sometimes it causes vague symptoms like "aching all over," feeling tired, loss of appetite, or fever.  A viral illness usually lasts 1 to 2 weeks, but sometimes " it lasts longer. In some cases, a more serious infection can look like a viral syndrome in the first few days of the illness. You may need another exam and additional tests to know the difference. Watch for the warning signs listed below.  Home care  Follow these guidelines for taking care of yourself at home:  · If symptoms are severe, rest at home for the first 2 to 3 days.  · Stay away from cigarette smoke - both your smoke and the smoke from others.  · You may use over-the-counter acetaminophen or ibuprofen for fever, muscle aching, and headache, unless another medicine was prescribed for this. If you have chronic liver or kidney disease or ever had a stomach ulcer or GI bleeding, talk with your doctor before using these medicines. No one who is younger than 18 and ill with a fever should take aspirin. It may cause severe disease or death.  · Your appetite may be poor, so a light diet is fine. Avoid dehydration by drinking 8 to 12 8-ounce glasses of fluids each day. This may include water; orange juice; lemonade; apple, grape, and cranberry juice; clear fruit drinks; electrolyte replacement and sports drinks; and decaffeinated teas and coffee. If you have been diagnosed with a kidney disease, ask your doctor how much and what types of fluids you should drink to prevent dehydration. If you have kidney disease, drinking too much fluid can cause it build up in the your body and be dangerous to your health.  · Over-the-counter remedies won't shorten the length of the illness but may be helpful for cough, sore throat; and nasal and sinus congestion. Don't use decongestants if you have high blood pressure.  Follow-up care  Follow up with your healthcare provider if you do not improve over the next week.  Call 911  Get emergency medical care if any of the following occur:  · Convulsion  · Feeling weak, dizzy, or like you are going to faint  · Chest pain, shortness of breath, wheezing, or difficulty breathing  When to  seek medical advice  Call your healthcare provider right away if any of these occur:  · Cough with lots of colored sputum (mucus) or blood in your sputum  · Chest pain, shortness of breath, wheezing, or difficulty breathing  · Severe headache; face, neck, or ear pain  · Severe, constant pain in the lower right side of your belly (abdominal)  · Continued vomiting (cant keep liquids down)  · Frequent diarrhea (more than 5 times a day); blood (red or black color) or mucus in diarrhea  · Feeling weak, dizzy, or like you are going to faint  · Extreme thirst  · Fever of 100.4°F (38°C) or higher, or as directed by your healthcare provider  Date Last Reviewed: 9/25/2015 © 2000-2017 [employer]. [address] [place] [address], [place] [address]. All rights reserved. This information is not intended as a substitute for professional medical care. Always follow your healthcare professional's instructions.

## 2019-10-10 NOTE — PATIENT INSTRUCTIONS
If symptoms do not improve by tomorrow please go to the emergency department.  If symptoms worsen, change, or fail to improve or become worsening or more severe please go to the emergency department  Follow-up with primary care Monday    Please return here or go to the Emergency Department for any concerns or worsening of condition.  If you were prescribed antibiotics, please take them to completion.  If you were prescribed a narcotic medication, do not drive or operate heavy equipment or machinery while taking these medications.  Please follow up with your primary care doctor or specialist as needed.    If you  smoke, please stop smoking.              COPD Flare    You have had a flare-up of your COPD.  COPD, or chronic obstructive pulmonary disease, is a common lung disease. It causes your airways to become irritated and narrower. This makes it harder for you to breathe. Emphysema and chronic bronchitis are both types of COPD. This is a chronic condition, which means you always have it. Sometimes it gets worse. When this happens, it is called a flare-up.  Symptoms of COPD  People with COPD may have symptoms most of the time. In a flare-up, your symptoms get worse. These symptoms may mean you are having a flare-up:  · Shortness of breath, shallow or rapid breathing, or wheezing that gets worse  · Lung infection  · Cough that gets worse  · More mucus, thicker mucus or mucus of a different color  · Tiredness, decreased energy, or trouble doing your usual activities  · Fever  · Chest tightness  · Your symptoms dont get better even when you use your usual medicines, inhalers, and nebulizer  · Trouble talking  · You feel confused  Causes of flare-ups  Unfortunately, a flare-up can happen even though you did everything right, and you followed your doctors instructions. Some causes of flare-ups are:  · Smoking or secondhand smoke  · Colds, the flu, or respiratory infections  · Air pollution  · Sudden change in the  weather  · Dust, irritating chemicals, or strong fumes  · Not taking your medicines as prescribed  Home care  Here are some things you can do at home to treat a flare-up:  · Try not to panic. This makes it harder to breathe, and keeps you from doing the right things.  · Dont smoke or be around others who are smoking.  · Try to drink more fluids than usual during a flare-up, unless your doctor has told you not to because of heart and kidney problems. More fluids can help loosen the mucus.  · Use your inhalers and nebulizer, if you have one, as you have been told to.  · If you were given antibiotics, take them until they are used up or your doctor tells you to stop. Its important to finish the antibiotics, even though you feel better. This will make sure the infection has cleared.  · If you were given prednisone or another steroid, finish it even if you feel better.  Preventing a flare-up  Even though flare-ups happen, the best way to treat one is to prevent it before it starts. Here are some pointers:  · Dont smoke or be around others who are smoking.  · Take your medicines as you have been told.  · Talk with your doctor about getting a flu shot every year. Also find out if you need a pneumonia shot.  · If there is a weather advisory warning to stay indoors, try to stay inside when possible.  · Try to eat healthy and get plenty of sleep.  · Try to avoid things that usually set you off, like dust, chemical fumes, hairsprays, or strong perfumes.  Follow-up care  Follow up with your healthcare provider, or as advised.  If a culture was done, you will be told if your treatment needs to be changed. You can call as directed for the results.  If X-rays were done, you will be notified of any new findings that may affect your care.  Call 911  Call 911 if any of these occur:  · You have trouble breathing  · You feel confused or its difficult to wake you up  · You faint or lose consciousness  · You have a rapid heart  "rate  · You have new pain in your chest, arm, shoulder, neck or upper back  When to seek medical advice  Call your healthcare provider right away if any of these occur:  · Wheezing or shortness of breath gets worse  · You need to use your inhalers more often than usual without relief  · Fever of 100.4°F (38ºC) or higher, or as directed by your healthcare provider  · Coughing up lots of dark-colored or bloody mucus (sputum)  · Chest pain with each breath  · You do not start to get better within 24 hours  · Swelling of your ankles gets worse  · Dizziness or weakness  Date Last Reviewed: 9/1/2016 © 2000-2017 [employer]. [address] [place] [address], [place] [address]. All rights reserved. This information is not intended as a substitute for professional medical care. Always follow your healthcare professional's instructions.            Viral Syndrome (Adult)  A viral illness may cause a number of symptoms. The symptoms depend on the part of the body that the virus affects. If it settles in your nose, throat, and lungs, it may cause cough, sore throat, congestion, and sometimes headache. If it settles in your stomach and intestinal tract, it may cause vomiting and diarrhea. Sometimes it causes vague symptoms like "aching all over," feeling tired, loss of appetite, or fever.  A viral illness usually lasts 1 to 2 weeks, but sometimes it lasts longer. In some cases, a more serious infection can look like a viral syndrome in the first few days of the illness. You may need another exam and additional tests to know the difference. Watch for the warning signs listed below.  Home care  Follow these guidelines for taking care of yourself at home:  · If symptoms are severe, rest at home for the first 2 to 3 days.  · Stay away from cigarette smoke - both your smoke and the smoke from others.  · You may use over-the-counter acetaminophen or ibuprofen for fever, muscle aching, and headache, unless another medicine was " prescribed for this. If you have chronic liver or kidney disease or ever had a stomach ulcer or GI bleeding, talk with your doctor before using these medicines. No one who is younger than 18 and ill with a fever should take aspirin. It may cause severe disease or death.  · Your appetite may be poor, so a light diet is fine. Avoid dehydration by drinking 8 to 12 8-ounce glasses of fluids each day. This may include water; orange juice; lemonade; apple, grape, and cranberry juice; clear fruit drinks; electrolyte replacement and sports drinks; and decaffeinated teas and coffee. If you have been diagnosed with a kidney disease, ask your doctor how much and what types of fluids you should drink to prevent dehydration. If you have kidney disease, drinking too much fluid can cause it build up in the your body and be dangerous to your health.  · Over-the-counter remedies won't shorten the length of the illness but may be helpful for cough, sore throat; and nasal and sinus congestion. Don't use decongestants if you have high blood pressure.  Follow-up care  Follow up with your healthcare provider if you do not improve over the next week.  Call 911  Get emergency medical care if any of the following occur:  · Convulsion  · Feeling weak, dizzy, or like you are going to faint  · Chest pain, shortness of breath, wheezing, or difficulty breathing  When to seek medical advice  Call your healthcare provider right away if any of these occur:  · Cough with lots of colored sputum (mucus) or blood in your sputum  · Chest pain, shortness of breath, wheezing, or difficulty breathing  · Severe headache; face, neck, or ear pain  · Severe, constant pain in the lower right side of your belly (abdominal)  · Continued vomiting (cant keep liquids down)  · Frequent diarrhea (more than 5 times a day); blood (red or black color) or mucus in diarrhea  · Feeling weak, dizzy, or like you are going to faint  · Extreme thirst  · Fever of 100.4°F (38°C)  or higher, or as directed by your healthcare provider  Date Last Reviewed: 9/25/2015  © 3703-7779 [employer]. [address] [place] [address], [place] [address]. All rights reserved. This information is not intended as a substitute for professional medical care. Always follow your healthcare professional's instructions.

## 2019-10-11 ENCOUNTER — TELEPHONE (OUTPATIENT)
Dept: URGENT CARE | Facility: CLINIC | Age: 49
End: 2019-10-11

## 2019-10-12 DIAGNOSIS — M54.50 CHRONIC MIDLINE LOW BACK PAIN WITHOUT SCIATICA: ICD-10-CM

## 2019-10-12 DIAGNOSIS — G89.29 CHRONIC MIDLINE LOW BACK PAIN WITHOUT SCIATICA: ICD-10-CM

## 2019-10-14 RX ORDER — METHOCARBAMOL 500 MG/1
TABLET, FILM COATED ORAL
Qty: 100 TABLET | Refills: 0 | Status: SHIPPED | OUTPATIENT
Start: 2019-10-14 | End: 2019-11-17 | Stop reason: SDUPTHER

## 2019-10-16 DIAGNOSIS — G89.29 CHRONIC BILATERAL LOW BACK PAIN WITHOUT SCIATICA: ICD-10-CM

## 2019-10-16 DIAGNOSIS — M54.50 CHRONIC BILATERAL LOW BACK PAIN WITHOUT SCIATICA: ICD-10-CM

## 2019-10-16 RX ORDER — MELOXICAM 15 MG/1
TABLET ORAL
Qty: 30 TABLET | Refills: 2 | Status: SHIPPED | OUTPATIENT
Start: 2019-10-16 | End: 2020-01-16

## 2019-10-21 ENCOUNTER — LAB VISIT (OUTPATIENT)
Dept: LAB | Facility: HOSPITAL | Age: 49
End: 2019-10-21
Attending: INTERNAL MEDICINE
Payer: MEDICAID

## 2019-10-21 DIAGNOSIS — I10 ESSENTIAL HYPERTENSION: Chronic | ICD-10-CM

## 2019-10-21 DIAGNOSIS — E11.42 TYPE 2 DIABETES MELLITUS WITH DIABETIC POLYNEUROPATHY, WITHOUT LONG-TERM CURRENT USE OF INSULIN: ICD-10-CM

## 2019-10-21 LAB
ALBUMIN SERPL BCP-MCNC: 3.8 G/DL (ref 3.5–5.2)
ALP SERPL-CCNC: 85 U/L (ref 55–135)
ALT SERPL W/O P-5'-P-CCNC: 18 U/L (ref 10–44)
ANION GAP SERPL CALC-SCNC: 8 MMOL/L (ref 8–16)
AST SERPL-CCNC: 12 U/L (ref 10–40)
BASOPHILS # BLD AUTO: 0.11 K/UL (ref 0–0.2)
BASOPHILS NFR BLD: 1 % (ref 0–1.9)
BILIRUB SERPL-MCNC: 0.2 MG/DL (ref 0.1–1)
BUN SERPL-MCNC: 4 MG/DL (ref 6–20)
CALCIUM SERPL-MCNC: 9.1 MG/DL (ref 8.7–10.5)
CHLORIDE SERPL-SCNC: 100 MMOL/L (ref 95–110)
CO2 SERPL-SCNC: 31 MMOL/L (ref 23–29)
CREAT SERPL-MCNC: 0.6 MG/DL (ref 0.5–1.4)
DIFFERENTIAL METHOD: ABNORMAL
EOSINOPHIL # BLD AUTO: 0.4 K/UL (ref 0–0.5)
EOSINOPHIL NFR BLD: 3.7 % (ref 0–8)
ERYTHROCYTE [DISTWIDTH] IN BLOOD BY AUTOMATED COUNT: 14.9 % (ref 11.5–14.5)
EST. GFR  (AFRICAN AMERICAN): >60 ML/MIN/1.73 M^2
EST. GFR  (NON AFRICAN AMERICAN): >60 ML/MIN/1.73 M^2
ESTIMATED AVG GLUCOSE: 146 MG/DL (ref 68–131)
GLUCOSE SERPL-MCNC: 155 MG/DL (ref 70–110)
HBA1C MFR BLD HPLC: 6.7 % (ref 4–5.6)
HCT VFR BLD AUTO: 42.1 % (ref 37–48.5)
HGB BLD-MCNC: 13.4 G/DL (ref 12–16)
IMM GRANULOCYTES # BLD AUTO: 0.1 K/UL (ref 0–0.04)
IMM GRANULOCYTES NFR BLD AUTO: 0.9 % (ref 0–0.5)
LYMPHOCYTES # BLD AUTO: 4.7 K/UL (ref 1–4.8)
LYMPHOCYTES NFR BLD: 41.1 % (ref 18–48)
MCH RBC QN AUTO: 29.5 PG (ref 27–31)
MCHC RBC AUTO-ENTMCNC: 31.8 G/DL (ref 32–36)
MCV RBC AUTO: 93 FL (ref 82–98)
MONOCYTES # BLD AUTO: 1.2 K/UL (ref 0.3–1)
MONOCYTES NFR BLD: 10.7 % (ref 4–15)
NEUTROPHILS # BLD AUTO: 4.8 K/UL (ref 1.8–7.7)
NEUTROPHILS NFR BLD: 42.6 % (ref 38–73)
NRBC BLD-RTO: 0 /100 WBC
PLATELET # BLD AUTO: 515 K/UL (ref 150–350)
PMV BLD AUTO: 10 FL (ref 9.2–12.9)
POTASSIUM SERPL-SCNC: 4.8 MMOL/L (ref 3.5–5.1)
PROT SERPL-MCNC: 6.5 G/DL (ref 6–8.4)
RBC # BLD AUTO: 4.55 M/UL (ref 4–5.4)
SODIUM SERPL-SCNC: 139 MMOL/L (ref 136–145)
WBC # BLD AUTO: 11.35 K/UL (ref 3.9–12.7)

## 2019-10-21 PROCEDURE — 83036 HEMOGLOBIN GLYCOSYLATED A1C: CPT

## 2019-10-21 PROCEDURE — 80053 COMPREHEN METABOLIC PANEL: CPT

## 2019-10-21 PROCEDURE — 36415 COLL VENOUS BLD VENIPUNCTURE: CPT | Mod: PN

## 2019-10-21 PROCEDURE — 85025 COMPLETE CBC W/AUTO DIFF WBC: CPT

## 2019-11-03 DIAGNOSIS — E11.42 TYPE 2 DIABETES MELLITUS WITH DIABETIC POLYNEUROPATHY, WITHOUT LONG-TERM CURRENT USE OF INSULIN: ICD-10-CM

## 2019-11-03 DIAGNOSIS — J44.9 CHRONIC OBSTRUCTIVE PULMONARY DISEASE, UNSPECIFIED COPD TYPE: Chronic | ICD-10-CM

## 2019-11-03 DIAGNOSIS — I10 ESSENTIAL HYPERTENSION: Chronic | ICD-10-CM

## 2019-11-03 DIAGNOSIS — M54.32 LEFT SIDED SCIATICA: ICD-10-CM

## 2019-11-04 RX ORDER — GABAPENTIN 600 MG/1
TABLET ORAL
Qty: 60 TABLET | Refills: 1 | Status: SHIPPED | OUTPATIENT
Start: 2019-11-04 | End: 2020-01-02

## 2019-11-04 RX ORDER — PRAVASTATIN SODIUM 40 MG/1
TABLET ORAL
Qty: 30 TABLET | Refills: 1 | Status: SHIPPED | OUTPATIENT
Start: 2019-11-04 | End: 2020-01-02

## 2019-11-04 RX ORDER — LISINOPRIL 5 MG/1
TABLET ORAL
Qty: 30 TABLET | Refills: 1 | Status: SHIPPED | OUTPATIENT
Start: 2019-11-04 | End: 2020-01-02

## 2019-11-04 RX ORDER — METOPROLOL TARTRATE 25 MG/1
TABLET, FILM COATED ORAL
Qty: 30 TABLET | Refills: 1 | Status: SHIPPED | OUTPATIENT
Start: 2019-11-04 | End: 2019-11-27

## 2019-11-04 RX ORDER — MOMETASONE FUROATE AND FORMOTEROL FUMARATE DIHYDRATE 100; 5 UG/1; UG/1
AEROSOL RESPIRATORY (INHALATION)
Qty: 13 G | Refills: 1 | Status: SHIPPED | OUTPATIENT
Start: 2019-11-04 | End: 2020-03-06 | Stop reason: DRUGHIGH

## 2019-11-07 ENCOUNTER — PATIENT OUTREACH (OUTPATIENT)
Dept: ADMINISTRATIVE | Facility: OTHER | Age: 49
End: 2019-11-07

## 2019-11-12 ENCOUNTER — DOCUMENTATION ONLY (OUTPATIENT)
Dept: REHABILITATION | Facility: HOSPITAL | Age: 49
End: 2019-11-12

## 2019-11-12 NOTE — PROGRESS NOTES
11/12/19    Patient has cancelled 3 healthy back visits and has no showed for 1 visit.  Called and left message for patient.   She completed 13 visits of Healthy Back program but has not attended since 10/7/19.   Left message for patient to call, to discuss wellness or discharge at this time.

## 2019-11-17 DIAGNOSIS — M54.50 CHRONIC MIDLINE LOW BACK PAIN WITHOUT SCIATICA: ICD-10-CM

## 2019-11-17 DIAGNOSIS — M54.50 CHRONIC LEFT-SIDED LOW BACK PAIN WITHOUT SCIATICA: ICD-10-CM

## 2019-11-17 DIAGNOSIS — G89.29 CHRONIC LEFT-SIDED LOW BACK PAIN WITHOUT SCIATICA: ICD-10-CM

## 2019-11-17 DIAGNOSIS — G89.29 CHRONIC MIDLINE LOW BACK PAIN WITHOUT SCIATICA: ICD-10-CM

## 2019-11-17 RX ORDER — METHOCARBAMOL 500 MG/1
TABLET, FILM COATED ORAL
Qty: 100 TABLET | Refills: 0 | Status: CANCELLED | OUTPATIENT
Start: 2019-11-17

## 2019-11-17 RX ORDER — IBUPROFEN 600 MG/1
TABLET ORAL
Qty: 60 TABLET | Refills: 0 | Status: CANCELLED | OUTPATIENT
Start: 2019-11-17

## 2019-11-18 ENCOUNTER — TELEPHONE (OUTPATIENT)
Dept: FAMILY MEDICINE | Facility: CLINIC | Age: 49
End: 2019-11-18

## 2019-11-18 DIAGNOSIS — K21.9 GASTROESOPHAGEAL REFLUX DISEASE WITHOUT ESOPHAGITIS: ICD-10-CM

## 2019-11-18 RX ORDER — METHOCARBAMOL 500 MG/1
TABLET, FILM COATED ORAL
Qty: 100 TABLET | Refills: 0 | Status: SHIPPED | OUTPATIENT
Start: 2019-11-18 | End: 2019-12-20

## 2019-11-18 RX ORDER — IBUPROFEN 600 MG/1
TABLET ORAL
Qty: 60 TABLET | Refills: 0 | Status: SHIPPED | OUTPATIENT
Start: 2019-11-18 | End: 2020-01-16

## 2019-11-20 ENCOUNTER — DOCUMENTATION ONLY (OUTPATIENT)
Dept: REHABILITATION | Facility: HOSPITAL | Age: 49
End: 2019-11-20

## 2019-11-20 ENCOUNTER — OFFICE VISIT (OUTPATIENT)
Dept: URGENT CARE | Facility: CLINIC | Age: 49
End: 2019-11-20
Payer: MEDICAID

## 2019-11-20 VITALS
TEMPERATURE: 99 F | SYSTOLIC BLOOD PRESSURE: 142 MMHG | HEART RATE: 92 BPM | OXYGEN SATURATION: 97 % | RESPIRATION RATE: 18 BRPM | BODY MASS INDEX: 38.49 KG/M2 | WEIGHT: 231 LBS | DIASTOLIC BLOOD PRESSURE: 81 MMHG | HEIGHT: 65 IN

## 2019-11-20 DIAGNOSIS — J40 BRONCHITIS: Primary | ICD-10-CM

## 2019-11-20 DIAGNOSIS — R09.81 NASAL CONGESTION: ICD-10-CM

## 2019-11-20 DIAGNOSIS — Z20.828 EXPOSURE TO THE FLU: ICD-10-CM

## 2019-11-20 LAB
CTP QC/QA: YES
CTP QC/QA: YES
FLUAV AG NPH QL: NEGATIVE
FLUBV AG NPH QL: NEGATIVE
S PYO RRNA THROAT QL PROBE: NEGATIVE

## 2019-11-20 PROCEDURE — 99214 OFFICE O/P EST MOD 30 MIN: CPT | Mod: S$GLB,,, | Performed by: NURSE PRACTITIONER

## 2019-11-20 PROCEDURE — 87804 INFLUENZA ASSAY W/OPTIC: CPT | Mod: 59,QW,S$GLB, | Performed by: NURSE PRACTITIONER

## 2019-11-20 PROCEDURE — 87804 POCT INFLUENZA A/B: ICD-10-PCS | Mod: 59,QW,S$GLB, | Performed by: NURSE PRACTITIONER

## 2019-11-20 PROCEDURE — 87880 POCT RAPID STREP A: ICD-10-PCS | Mod: QW,S$GLB,, | Performed by: NURSE PRACTITIONER

## 2019-11-20 PROCEDURE — 99214 PR OFFICE/OUTPT VISIT, EST, LEVL IV, 30-39 MIN: ICD-10-PCS | Mod: S$GLB,,, | Performed by: NURSE PRACTITIONER

## 2019-11-20 PROCEDURE — 87880 STREP A ASSAY W/OPTIC: CPT | Mod: QW,S$GLB,, | Performed by: NURSE PRACTITIONER

## 2019-11-20 RX ORDER — AZELASTINE 1 MG/ML
1 SPRAY, METERED NASAL 2 TIMES DAILY
Qty: 30 ML | Refills: 0 | Status: ON HOLD | OUTPATIENT
Start: 2019-11-20 | End: 2021-02-20 | Stop reason: HOSPADM

## 2019-11-20 RX ORDER — PREDNISONE 20 MG/1
20 TABLET ORAL DAILY
Qty: 5 TABLET | Refills: 0 | Status: SHIPPED | OUTPATIENT
Start: 2019-11-20 | End: 2019-11-25

## 2019-11-20 RX ORDER — BENZONATATE 100 MG/1
100 CAPSULE ORAL 3 TIMES DAILY PRN
Qty: 30 CAPSULE | Refills: 0 | Status: SHIPPED | OUTPATIENT
Start: 2019-11-20 | End: 2019-11-30

## 2019-11-20 RX ORDER — PROMETHAZINE HYDROCHLORIDE AND DEXTROMETHORPHAN HYDROBROMIDE 6.25; 15 MG/5ML; MG/5ML
5 SYRUP ORAL
Qty: 118 ML | Refills: 0 | Status: SHIPPED | OUTPATIENT
Start: 2019-11-20 | End: 2020-01-31 | Stop reason: ALTCHOICE

## 2019-11-21 ENCOUNTER — DOCUMENTATION ONLY (OUTPATIENT)
Dept: REHABILITATION | Facility: HOSPITAL | Age: 49
End: 2019-11-21

## 2019-11-21 NOTE — PATIENT INSTRUCTIONS
PLEASE READ YOUR DISCHARGE INSTRUCTIONS ENTIRELY AS IT CONTAINS IMPORTANT INFORMATION.      Please take your steroids to completion.     Use the albuterol inhaler for wheezing.     Take tessalon perle's as prescribed for cough. DO NOT take at the same time as the cough syrup.    Do not drive while taking the cough syrup - best to take it at night before going to sleep. However, you can take it during the day (every 4-6 hours) if you do not have to drive or operate machinery. This medication will make you drowsy. Try taking half a dose first to see how it affects you.    Please return or see your primary care doctor if you develop new or worsening symptoms.     Please arrange follow up with your primary medical clinic as soon as possible. You must understand that you've received an Urgent Care treatment only and that you may be released before all of your medical problems are known or treated. You, the patient, will arrange for follow up as instructed. If your symptoms worsen or fail to improve you should go to the Emergency Room.      Acute Bronchitis  Your healthcare provider has told you that you have acute bronchitis. Bronchitis is infection or inflammation of the bronchial tubes (airways in the lungs). Normally, air moves easily in and out of the airways. Bronchitis narrows the airways, making it harder for air to flow in and out of the lungs. This causes symptoms such as shortness of breath, coughing up yellow or green mucus, and wheezing. Bronchitis can be acute or chronic. Acute means the condition comes on quickly and goes away in a short time, usually within 3 to 10 days. Chronic means a condition lasts a long time and often comes back.    What causes acute bronchitis?  Acute bronchitis almost always starts as a viral respiratory infection, such as a cold or the flu. Certain factors make it more likely for a cold or flu to turn into bronchitis. These include being very young, being elderly, having a  heart or lung problem, or having a weak immune system. Cigarette smoking also makes bronchitis more likely.  When bronchitis develops, the airways become swollen. The airways may also become infected with bacteria. This is known as a secondary infection.  Diagnosing acute bronchitis  Your healthcare provider will examine you and ask about your symptoms and health history. You may also have a sputum culture to test the fluid in your lungs. Chest X-rays may be done to look for infection in the lungs.  Treating acute bronchitis  Bronchitis usually clears up as the cold or flu goes away. You can help feel better faster by doing the following:  · Take medicine as directed. You may be told to take ibuprofen or other over-the-counter medicines. These help relieve inflammation in your bronchial tubes. Your healthcare provider may prescribe an inhaler to help open up the bronchial tubes. Most of the time, acute bronchitis is caused by a viral infection. Antibiotics are usually not prescribed for viral infections.  · Drink plenty of fluids, such as water, juice, or warm soup. Fluids loosen mucus so that you can cough it up. This helps you breathe more easily. Fluids also prevent dehydration.  · Make sure you get plenty of rest.  · Do not smoke. Do not allow anyone else to smoke in your home.  Recovery and follow-up  Follow up with your doctor as you are told. You will likely feel better in a week or two. But a dry cough can linger beyond that time. Let your doctor know if you still have symptoms (other than a dry cough) after 2 weeks, or if youre prone to getting bronchial infections. Take steps to protect yourself from future infections. These steps include stopping smoking and avoiding tobacco smoke, washing your hands often, and getting a yearly flu shot.  When to call your healthcare provider  Call the healthcare provider if you have any of the following:  · Fever of 100.4°F (38.0°C) or higher, or as advised  · Symptoms  that get worse, or new symptoms  · Trouble breathing  · Symptoms that dont start to improve within a week, or within 3 days of taking antibiotics   Date Last Reviewed: 12/1/2016  © 5608-0212 [employer]. [address] [place] [address], [place] [address]. All rights reserved. This information is not intended as a substitute for professional medical care. Always follow your healthcare professional's instructions.

## 2019-11-21 NOTE — PROGRESS NOTES
Outpatient Physical Therapy Discharge Summary    Date: 2019      Date of PT eval: patient attended 2 visits healthy back 18 -18 but canceled and no showed  PT restarted 19 and has attended 18 times with 13 cancels and no shows in total from 19  Number of PT visits: 18 (throughout 10 months-sporadic attendance)  Date of last visit: 10/7/19    Assessment:  Baseline IM Testing Results:   Date of testin19  ROM 0-42 deg   Max Peak Torque 135    Min Peak Torque 54    Flex/Ext Ratio 2.5:1   % below normative data -21%      Mid-term  IM Testing Results:   Date of testin2019  ROM 0-45 deg   Max Peak Torque 144 ft.lbs   Min Peak Torque 86 ft.lbs   Flex/Ext Ratio 1.6   % Above normative data 52%       Goals:   Short term goals:  5 weeks or 10 visits   1.  Pt will demonstrate increased lumbar ROM by at least 3 degrees from the initial ROM value with improvements noted in functional ROM and ability to perform ADLs. - Goal met 2019  2.  Pt will demonstrate increased maximum isometric torque value by 25% when compared to the initial value resulting in improved ability to perform bending, lifting, and carrying activities safely, confidently. - Goal not met 2019  3.  Patient report a reduction in worst pain score by 1-2 points for improved tolerance for walking and household chores. - Goal not met 2019  4.  Pt able to perform HEP correctly with minimal cueing or supervision from therapist to encourage independent management of symptoms. - Goal met 2019     Long term goals: 10 weeks or 20 visits   NOT MET  1. Pt will demonstrate increased lumbar ROM by at least 6 degrees from initial ROM value, resulting in improved ability to perform functional fwd bending while standing and sitting.   2. Pt will demonstrate increased maximum isometric torque value by 50% when compared to the initial value resulting in improved ability to perform bending, lifting, and carrying activities  safely, confidently.  3. Pt to demonstrate ability to independently control and reduce their pain through posture positioning and mechanical movements throughout a typical day.  4.  Pt will demonstrate reduced pain and improved functional outcomes as reported on the FOTO by reaching a score of CJ = at least 20% but < 40% impaired, limited or restricted or less in order to demonstrate subjective improvement in pt's condition.    5. Pt will demonstrate independence with the HEP at discharge  6.  Pt will be able to perform daily household chores with little to no low back pain.          Plan: Discharge from outpatient physical therapy skilled intervention and therapy care due to failure to attend regularly.   We discussed continuation of program as wellness patient to maintain strength.  Stressed the importance of moving, and exercising.  She decided to attend wellness.     We will discharge therapy and skilled intervention and patient will continue as wellness patient to maintain strength and exercise with a health .

## 2019-11-21 NOTE — PROGRESS NOTES
"Subjective:       Patient ID: Audrey Natarajan is a 49 y.o. female.    Vitals:  height is 5' 5" (1.651 m) and weight is 104.8 kg (231 lb). Her temperature is 98.6 °F (37 °C). Her blood pressure is 142/81 (abnormal) and her pulse is 92. Her respiration is 18 and oxygen saturation is 97%.     Chief Complaint: URI    Pt here today for c/o cough and nasal congestion x5 days. She has tried taking Mucinex and Flonase for the symptoms, however she has not improved.    URI    This is a new problem. Episode onset: 5 days. The problem has been gradually worsening. There has been no fever. Associated symptoms include congestion, coughing and a sore throat. Pertinent negatives include no ear pain, nausea, rash, sinus pain, vomiting or wheezing. She has tried decongestant for the symptoms. The treatment provided no relief.       Constitution: Positive for fatigue. Negative for chills, sweating and fever.   HENT: Positive for congestion and sore throat. Negative for ear pain, sinus pain, sinus pressure and voice change.    Neck: Negative for painful lymph nodes.   Eyes: Negative for eye redness.   Respiratory: Positive for cough and sputum production. Negative for chest tightness, bloody sputum, COPD, shortness of breath, stridor, wheezing and asthma.    Gastrointestinal: Negative for nausea and vomiting.   Musculoskeletal: Positive for muscle ache.   Skin: Negative for rash.   Allergic/Immunologic: Negative for seasonal allergies and asthma.   Hematologic/Lymphatic: Negative for swollen lymph nodes.       Objective:      Physical Exam   Constitutional: She is oriented to person, place, and time. She appears well-developed and well-nourished. She is cooperative.  Non-toxic appearance. She does not have a sickly appearance. She does not appear ill. No distress.   HENT:   Head: Normocephalic and atraumatic.   Right Ear: Hearing, tympanic membrane, external ear and ear canal normal.   Left Ear: Hearing, tympanic membrane, " external ear and ear canal normal.   Nose: Mucosal edema and rhinorrhea present. No nasal deformity. No epistaxis. Right sinus exhibits no maxillary sinus tenderness and no frontal sinus tenderness. Left sinus exhibits no maxillary sinus tenderness and no frontal sinus tenderness.   Mouth/Throat: Uvula is midline and mucous membranes are normal. No trismus in the jaw. Normal dentition. No uvula swelling. Posterior oropharyngeal erythema and cobblestoning present. No oropharyngeal exudate or posterior oropharyngeal edema. Tonsils are 0 on the right. Tonsils are 0 on the left. No tonsillar exudate.   Tonsils surgically absent.   Eyes: Conjunctivae and lids are normal. No scleral icterus.   Neck: Trachea normal, full passive range of motion without pain and phonation normal. Neck supple. No neck rigidity. No edema and no erythema present.   Cardiovascular: Normal rate, regular rhythm, normal heart sounds, intact distal pulses and normal pulses.   Pulmonary/Chest: Effort normal and breath sounds normal. No stridor. No respiratory distress. She has no decreased breath sounds. She has no wheezes. She has no rhonchi. She has no rales.   NON PRODUCTIVE COUGH PRESENT THROUGHOUT EXAM.  Deep inspiration causes coughing     Abdominal: Normal appearance.   Musculoskeletal: Normal range of motion. She exhibits no edema or deformity.   Lymphadenopathy:        Head (right side): Tonsillar adenopathy present. No submental, no submandibular, no preauricular and no posterior auricular adenopathy present.        Head (left side): Tonsillar adenopathy present. No submental, no submandibular, no preauricular and no posterior auricular adenopathy present.     She has no cervical adenopathy.   Neurological: She is alert and oriented to person, place, and time. She exhibits normal muscle tone. Coordination normal.   Skin: Skin is warm, dry, intact, not diaphoretic and not pale.   Psychiatric: She has a normal mood and affect. Her speech is  normal and behavior is normal. Judgment and thought content normal. Cognition and memory are normal.   Nursing note and vitals reviewed.        Results for orders placed or performed in visit on 11/20/19   POCT Influenza A/B   Result Value Ref Range    Rapid Influenza A Ag Negative Negative    Rapid Influenza B Ag Negative Negative     Acceptable Yes    POCT rapid strep A   Result Value Ref Range    Rapid Strep A Screen Negative Negative     Acceptable Yes        Assessment:       1. Bronchitis    2. Exposure to the flu        Plan:         Bronchitis  -     predniSONE (DELTASONE) 20 MG tablet; Take 1 tablet (20 mg total) by mouth once daily. for 5 days  Dispense: 5 tablet; Refill: 0  -     benzonatate (TESSALON) 100 MG capsule; Take 1 capsule (100 mg total) by mouth 3 (three) times daily as needed.  Dispense: 30 capsule; Refill: 0  -     promethazine-dextromethorphan (PROMETHAZINE-DM) 6.25-15 mg/5 mL Syrp; Take 5 mLs by mouth every 4 to 6 hours as needed. 5 mL every 4 to 6 hours; maximum: 30 mL in 24 hours  Dispense: 118 mL; Refill: 0    Exposure to the flu  -     POCT Influenza A/B  -     POCT rapid strep A    Pt states she has adequate amount of albuterol inhalers as well as refills on albuterol nebulizer solution. Instructed to follow up with PCP or go to ER should symptoms fail to improve or worsen-- pt verbalizes understanding.     Patient Instructions         PLEASE READ YOUR DISCHARGE INSTRUCTIONS ENTIRELY AS IT CONTAINS IMPORTANT INFORMATION.      Please take your steroids to completion.     Use the albuterol inhaler for wheezing.     Take tessalon perle's as prescribed for cough. DO NOT take at the same time as the cough syrup.    Do not drive while taking the cough syrup - best to take it at night before going to sleep. However, you can take it during the day (every 4-6 hours) if you do not have to drive or operate machinery. This medication will make you drowsy. Try taking  half a dose first to see how it affects you.    Please return or see your primary care doctor if you develop new or worsening symptoms.     Please arrange follow up with your primary medical clinic as soon as possible. You must understand that you've received an Urgent Care treatment only and that you may be released before all of your medical problems are known or treated. You, the patient, will arrange for follow up as instructed. If your symptoms worsen or fail to improve you should go to the Emergency Room.      Acute Bronchitis  Your healthcare provider has told you that you have acute bronchitis. Bronchitis is infection or inflammation of the bronchial tubes (airways in the lungs). Normally, air moves easily in and out of the airways. Bronchitis narrows the airways, making it harder for air to flow in and out of the lungs. This causes symptoms such as shortness of breath, coughing up yellow or green mucus, and wheezing. Bronchitis can be acute or chronic. Acute means the condition comes on quickly and goes away in a short time, usually within 3 to 10 days. Chronic means a condition lasts a long time and often comes back.    What causes acute bronchitis?  Acute bronchitis almost always starts as a viral respiratory infection, such as a cold or the flu. Certain factors make it more likely for a cold or flu to turn into bronchitis. These include being very young, being elderly, having a heart or lung problem, or having a weak immune system. Cigarette smoking also makes bronchitis more likely.  When bronchitis develops, the airways become swollen. The airways may also become infected with bacteria. This is known as a secondary infection.  Diagnosing acute bronchitis  Your healthcare provider will examine you and ask about your symptoms and health history. You may also have a sputum culture to test the fluid in your lungs. Chest X-rays may be done to look for infection in the lungs.  Treating acute  bronchitis  Bronchitis usually clears up as the cold or flu goes away. You can help feel better faster by doing the following:  · Take medicine as directed. You may be told to take ibuprofen or other over-the-counter medicines. These help relieve inflammation in your bronchial tubes. Your healthcare provider may prescribe an inhaler to help open up the bronchial tubes. Most of the time, acute bronchitis is caused by a viral infection. Antibiotics are usually not prescribed for viral infections.  · Drink plenty of fluids, such as water, juice, or warm soup. Fluids loosen mucus so that you can cough it up. This helps you breathe more easily. Fluids also prevent dehydration.  · Make sure you get plenty of rest.  · Do not smoke. Do not allow anyone else to smoke in your home.  Recovery and follow-up  Follow up with your doctor as you are told. You will likely feel better in a week or two. But a dry cough can linger beyond that time. Let your doctor know if you still have symptoms (other than a dry cough) after 2 weeks, or if youre prone to getting bronchial infections. Take steps to protect yourself from future infections. These steps include stopping smoking and avoiding tobacco smoke, washing your hands often, and getting a yearly flu shot.  When to call your healthcare provider  Call the healthcare provider if you have any of the following:  · Fever of 100.4°F (38.0°C) or higher, or as advised  · Symptoms that get worse, or new symptoms  · Trouble breathing  · Symptoms that dont start to improve within a week, or within 3 days of taking antibiotics   Date Last Reviewed: 12/1/2016 © 2000-2017 [employer]. [address] [place] [address], [place] [address]. All rights reserved. This information is not intended as a substitute for professional medical care. Always follow your healthcare professional's instructions.

## 2019-11-27 ENCOUNTER — OFFICE VISIT (OUTPATIENT)
Dept: FAMILY MEDICINE | Facility: CLINIC | Age: 49
End: 2019-11-27
Payer: MEDICAID

## 2019-11-27 VITALS
WEIGHT: 233.94 LBS | BODY MASS INDEX: 38.98 KG/M2 | SYSTOLIC BLOOD PRESSURE: 134 MMHG | RESPIRATION RATE: 18 BRPM | HEIGHT: 65 IN | HEART RATE: 96 BPM | OXYGEN SATURATION: 97 % | DIASTOLIC BLOOD PRESSURE: 72 MMHG | TEMPERATURE: 98 F

## 2019-11-27 DIAGNOSIS — B35.9 TINEA: ICD-10-CM

## 2019-11-27 DIAGNOSIS — I10 ESSENTIAL HYPERTENSION: Chronic | ICD-10-CM

## 2019-11-27 DIAGNOSIS — J44.9 CHRONIC OBSTRUCTIVE PULMONARY DISEASE, UNSPECIFIED COPD TYPE: Chronic | ICD-10-CM

## 2019-11-27 DIAGNOSIS — G89.29 CHRONIC BILATERAL LOW BACK PAIN WITH LEFT-SIDED SCIATICA: ICD-10-CM

## 2019-11-27 DIAGNOSIS — M54.42 CHRONIC BILATERAL LOW BACK PAIN WITH LEFT-SIDED SCIATICA: ICD-10-CM

## 2019-11-27 DIAGNOSIS — E11.42 TYPE 2 DIABETES MELLITUS WITH DIABETIC POLYNEUROPATHY, WITHOUT LONG-TERM CURRENT USE OF INSULIN: Primary | ICD-10-CM

## 2019-11-27 PROCEDURE — 99999 PR PBB SHADOW E&M-EST. PATIENT-LVL IV: ICD-10-PCS | Mod: PBBFAC,,, | Performed by: INTERNAL MEDICINE

## 2019-11-27 PROCEDURE — 99999 PR PBB SHADOW E&M-EST. PATIENT-LVL IV: CPT | Mod: PBBFAC,,, | Performed by: INTERNAL MEDICINE

## 2019-11-27 PROCEDURE — 99214 PR OFFICE/OUTPT VISIT, EST, LEVL IV, 30-39 MIN: ICD-10-PCS | Mod: S$PBB,,, | Performed by: INTERNAL MEDICINE

## 2019-11-27 PROCEDURE — 99214 OFFICE O/P EST MOD 30 MIN: CPT | Mod: PBBFAC,PN | Performed by: INTERNAL MEDICINE

## 2019-11-27 PROCEDURE — 99214 OFFICE O/P EST MOD 30 MIN: CPT | Mod: S$PBB,,, | Performed by: INTERNAL MEDICINE

## 2019-11-27 RX ORDER — METFORMIN HYDROCHLORIDE 1000 MG/1
1000 TABLET ORAL 2 TIMES DAILY WITH MEALS
Qty: 180 TABLET | Refills: 2 | Status: SHIPPED | OUTPATIENT
Start: 2019-11-27 | End: 2020-09-02

## 2019-11-27 RX ORDER — METOPROLOL TARTRATE 50 MG/1
50 TABLET ORAL 2 TIMES DAILY
Qty: 180 TABLET | Refills: 1 | Status: SHIPPED | OUTPATIENT
Start: 2019-11-27 | End: 2020-01-02 | Stop reason: SDUPTHER

## 2019-11-27 RX ORDER — ALBUTEROL SULFATE 0.63 MG/3ML
0.63 SOLUTION RESPIRATORY (INHALATION) EVERY 8 HOURS PRN
Qty: 1 BOX | Refills: 1 | Status: SHIPPED | OUTPATIENT
Start: 2019-11-27 | End: 2020-11-06 | Stop reason: SDUPTHER

## 2019-11-27 RX ORDER — NYSTATIN 100000 [USP'U]/G
POWDER TOPICAL 2 TIMES DAILY
Qty: 60 G | Refills: 2 | Status: SHIPPED | OUTPATIENT
Start: 2019-11-27 | End: 2020-06-01

## 2019-11-27 NOTE — PROGRESS NOTES
"Subjective:       Patient ID: Audrey Natarajan is a 49 y.o. female.    Chief Complaint: Establish Care and Follow-up    F/u chronic conditions    HPI: 50 y/o current smoker HTN DM morbid obesity COPD presents for scheduled follow up. Since last visit evaluation with vascular surgery recommended compression stockings JEYSON's were normal she is using compression but still feels top of feet are swollen at end of day. No dyspnea. Recently treated for COPD flare feels coughing is improving still using nebulizer before bed. 2D echo was normal not checking blood pressure at home taking metformin as prescribed    Review of Systems   Constitutional: Negative for activity change, appetite change, fatigue, fever and unexpected weight change.   HENT: Negative for ear pain, rhinorrhea and sore throat.    Eyes: Negative for discharge and visual disturbance.   Respiratory: Negative for chest tightness, shortness of breath and wheezing.    Cardiovascular: Negative for chest pain, palpitations and leg swelling.   Gastrointestinal: Negative for abdominal pain, constipation and diarrhea.   Endocrine: Negative for cold intolerance and heat intolerance.   Genitourinary: Negative for dysuria and hematuria.   Musculoskeletal: Positive for arthralgias and back pain. Negative for joint swelling and neck stiffness.   Skin: Negative for rash.   Neurological: Negative for dizziness, syncope, weakness and headaches.   Psychiatric/Behavioral: Negative for suicidal ideas.       Objective:     Vitals:    11/27/19 0911 11/27/19 0947   BP: (!) 140/74 134/72   BP Location: Right arm    Patient Position: Sitting    Pulse: 90 96   Resp: 18    Temp: 98.3 °F (36.8 °C)    TempSrc: Oral    SpO2: 97%    Weight: 106.1 kg (233 lb 14.5 oz)    Height: 5' 5" (1.651 m)           Physical Exam   Constitutional: She is oriented to person, place, and time. She appears well-developed and well-nourished.   HENT:   Head: Normocephalic and atraumatic.   Eyes: " Conjunctivae are normal. No scleral icterus.   Neck: Normal range of motion.   Cardiovascular: Normal rate and regular rhythm. Exam reveals no gallop and no friction rub.   No murmur heard.  Wearing bilateral compression stockings    Rate regular in 90's   Pulmonary/Chest: Effort normal and breath sounds normal. She has no wheezes. She has no rales.   Abdominal: Soft. Bowel sounds are normal. There is no tenderness. There is no rebound and no guarding.   Musculoskeletal: Normal range of motion. She exhibits no edema or tenderness.   Neurological: She is alert and oriented to person, place, and time. No cranial nerve deficit.   Skin: Skin is warm and dry.   Psychiatric: She has a normal mood and affect.       Assessment and Plan       - metFORMIN (GLUCOPHAGE) 1000 MG tablet; Take 1 tablet (1,000 mg total) by mouth 2 (two) times daily with meals.  Dispense: 180 tablet; Refill: 2    2. Type 2 diabetes mellitus with diabetic polyneuropathy, without long-term current use of insulin  Diabetes continue metformin stressed need to limit carbohydrate  - CBC auto differential; Future  - Comprehensive metabolic panel; Future  - Hemoglobin A1c; Future    3. Chronic bilateral low back pain with left-sided sciatica  Prn muscle relaxer    4. Essential hypertension  Increase beta block  - metoprolol tartrate (LOPRESSOR) 50 MG tablet; Take 1 tablet (50 mg total) by mouth 2 (two) times daily.  Dispense: 180 tablet; Refill: 1  - CBC auto differential; Future  - Comprehensive metabolic panel; Future    5. Tinea  Nystatin powder prn  - nystatin (MYCOSTATIN) powder; Apply topically 2 (two) times daily.  Dispense: 60 g; Refill: 2    6. Chronic obstructive pulmonary disease, unspecified COPD type  Continue dulera prn nebs  - albuterol (ACCUNEB) 0.63 mg/3 mL Nebu; Take 3 mLs (0.63 mg total) by nebulization every 8 (eight) hours as needed (wheezing). Rescue  Dispense: 1 Box; Refill: 1cbc

## 2019-11-29 ENCOUNTER — PATIENT OUTREACH (OUTPATIENT)
Dept: ADMINISTRATIVE | Facility: OTHER | Age: 49
End: 2019-11-29

## 2019-12-02 ENCOUNTER — TELEPHONE (OUTPATIENT)
Dept: FAMILY MEDICINE | Facility: CLINIC | Age: 49
End: 2019-12-02

## 2019-12-02 NOTE — TELEPHONE ENCOUNTER
----- Message from Abiola Main sent at 12/2/2019 10:33 AM CST -----  Contact: Pt   Name of Who is Calling: SABIHA DUNNE [[id number]]    What is the request in detail: Pt is requesting a call back regards to medication her insurance wouldn't cover omeprazole 20 mg TbEC Pt is asking is there something else can get called into the pharmacy  Please contact to further discuss and advise      Can the clinic reply by [facility]: No     What Number to Call Back if not in [facility]: [phone] (home)

## 2019-12-03 ENCOUNTER — OFFICE VISIT (OUTPATIENT)
Dept: OPTOMETRY | Facility: CLINIC | Age: 49
End: 2019-12-03
Payer: MEDICAID

## 2019-12-03 DIAGNOSIS — H52.4 HYPEROPIA OF BOTH EYES WITH ASTIGMATISM AND PRESBYOPIA: ICD-10-CM

## 2019-12-03 DIAGNOSIS — H52.03 HYPEROPIA OF BOTH EYES WITH ASTIGMATISM AND PRESBYOPIA: ICD-10-CM

## 2019-12-03 DIAGNOSIS — H52.203 HYPEROPIA OF BOTH EYES WITH ASTIGMATISM AND PRESBYOPIA: ICD-10-CM

## 2019-12-03 DIAGNOSIS — I10 ESSENTIAL HYPERTENSION: ICD-10-CM

## 2019-12-03 DIAGNOSIS — E11.9 DIABETES MELLITUS TYPE 2 WITHOUT RETINOPATHY: Primary | ICD-10-CM

## 2019-12-03 PROCEDURE — 92014 PR EYE EXAM, EST PATIENT,COMPREHESV: ICD-10-PCS | Mod: S$PBB,,, | Performed by: OPTOMETRIST

## 2019-12-03 PROCEDURE — 99999 PR PBB SHADOW E&M-EST. PATIENT-LVL II: CPT | Mod: PBBFAC,,, | Performed by: OPTOMETRIST

## 2019-12-03 PROCEDURE — 92015 PR REFRACTION: ICD-10-PCS | Mod: ,,, | Performed by: OPTOMETRIST

## 2019-12-03 PROCEDURE — 99212 OFFICE O/P EST SF 10 MIN: CPT | Mod: PBBFAC,PO | Performed by: OPTOMETRIST

## 2019-12-03 PROCEDURE — 92015 DETERMINE REFRACTIVE STATE: CPT | Mod: ,,, | Performed by: OPTOMETRIST

## 2019-12-03 PROCEDURE — 92014 COMPRE OPH EXAM EST PT 1/>: CPT | Mod: S$PBB,,, | Performed by: OPTOMETRIST

## 2019-12-03 PROCEDURE — 99999 PR PBB SHADOW E&M-EST. PATIENT-LVL II: ICD-10-PCS | Mod: PBBFAC,,, | Performed by: OPTOMETRIST

## 2019-12-03 RX ORDER — FUROSEMIDE 20 MG/1
TABLET ORAL
Qty: 30 TABLET | Refills: 2 | Status: SHIPPED | OUTPATIENT
Start: 2019-12-03 | End: 2020-03-06

## 2019-12-03 NOTE — PROGRESS NOTES
HPI     Annual diabetic eye exam. Medicaid vision  Hemoglobin A1C       Date                     Value               Ref Range             Status                10/21/2019               6.7 (H)             4.0 - 5.6 %           Final                   05/31/2019               5.9 (H)             4.0 - 5.6 %           Final                   01/30/2019               6.6 (H)             4.0 - 5.6 %           Final                   Last edited by Octavio Talavera, OD on 12/3/2019  9:55 AM. (History)            Assessment /Plan     For exam results, see Encounter Report.    Diabetes mellitus type 2 without retinopathy  -No retinopathy noted today.  Continued control with primary care physician and annual comprehensive eye exam.    Hyperopia of both eyes with astigmatism and presbyopia  Eyeglass Final Rx     Eyeglass Final Rx       Sphere Cylinder Axis Dist VA Add    Right +0.50 +1.00 170 20/25 +1.75    Left +0.25 +0.50 175 20/25 +1.75    Type:  PAL    Expiration Date:  12/3/2020                  RTC 1 yr

## 2019-12-05 ENCOUNTER — OFFICE VISIT (OUTPATIENT)
Dept: PODIATRY | Facility: CLINIC | Age: 49
End: 2019-12-05
Payer: MEDICAID

## 2019-12-05 VITALS
SYSTOLIC BLOOD PRESSURE: 137 MMHG | DIASTOLIC BLOOD PRESSURE: 77 MMHG | WEIGHT: 220.69 LBS | BODY MASS INDEX: 36.77 KG/M2 | HEIGHT: 65 IN | HEART RATE: 95 BPM

## 2019-12-05 DIAGNOSIS — M20.41 HAMMER TOES OF BOTH FEET: ICD-10-CM

## 2019-12-05 DIAGNOSIS — E11.49 TYPE II DIABETES MELLITUS WITH NEUROLOGICAL MANIFESTATIONS: Primary | ICD-10-CM

## 2019-12-05 DIAGNOSIS — M20.5X1 HALLUX LIMITUS, ACQUIRED, RIGHT: ICD-10-CM

## 2019-12-05 DIAGNOSIS — M76.72 PERONEUS BREVIS TENDONITIS, LEFT: ICD-10-CM

## 2019-12-05 DIAGNOSIS — M20.42 HAMMER TOES OF BOTH FEET: ICD-10-CM

## 2019-12-05 DIAGNOSIS — M20.12 HALLUX ABDUCTO VALGUS, LEFT: ICD-10-CM

## 2019-12-05 DIAGNOSIS — S93.402D INVERSION SPRAIN OF ANKLE, LEFT, SUBSEQUENT ENCOUNTER: ICD-10-CM

## 2019-12-05 DIAGNOSIS — M20.5X2 HALLUX LIMITUS, ACQUIRED, LEFT: ICD-10-CM

## 2019-12-05 PROCEDURE — 99999 PR PBB SHADOW E&M-EST. PATIENT-LVL III: CPT | Mod: PBBFAC,,, | Performed by: PODIATRIST

## 2019-12-05 PROCEDURE — 99213 OFFICE O/P EST LOW 20 MIN: CPT | Mod: PBBFAC,PO | Performed by: PODIATRIST

## 2019-12-05 PROCEDURE — 99213 PR OFFICE/OUTPT VISIT, EST, LEVL III, 20-29 MIN: ICD-10-PCS | Mod: S$PBB,,, | Performed by: PODIATRIST

## 2019-12-05 PROCEDURE — 99999 PR PBB SHADOW E&M-EST. PATIENT-LVL III: ICD-10-PCS | Mod: PBBFAC,,, | Performed by: PODIATRIST

## 2019-12-05 PROCEDURE — 99213 OFFICE O/P EST LOW 20 MIN: CPT | Mod: S$PBB,,, | Performed by: PODIATRIST

## 2019-12-05 RX ORDER — PANTOPRAZOLE SODIUM 40 MG/1
40 TABLET, DELAYED RELEASE ORAL DAILY
Qty: 30 TABLET | Refills: 2 | Status: SHIPPED | OUTPATIENT
Start: 2019-12-05 | End: 2020-03-16

## 2019-12-05 NOTE — PROGRESS NOTES
rSubjective:      Patient ID: Audrey Natarajan is a 49 y.o. female.    Chief Complaint: Diabetes Mellitus (OV 11/27/19 [facility] PCP); Foot Pain (FOOT AND ANKLE PAIN LEFT /ANKLE BRACE); and Nail Care      Audrey Natarajan is a 49 y.o. female who presents to the podiatry clinic  with complaint of left shin and central metatarsal pain.  Description: moderate Nature: aching, throbbing and bruising Onset of the symptoms was several months ago. Precipitating event: increased activity while on vacation.  History of injury: no Current symptoms include: worsening symptoms after a period of activity. Aggravating factors: standing and walking. Alleviating factors: rest. Symptoms have progressed to a point and plateaued. Patient has had prior foot problems. Evaluation to date: none. Treatment to date: none. Patients rates pain 6/10 on pain scale.      09/30/2019 patient presents to clinic for follow-up of persistent right foot and leg pain.  She relates that she discontinue use of Cam boot several days ago with instruction her primary care physician.  Patient states that she may have re-injured the foot secondary to a misstep a few days ago. She continues to have pain to the forefoot and lateral foot and ankle as well as the anterior leg.  Patient relates that she has done some icing.  She relates that she has not been taking any anti-inflammatories consistently.  Patient relates that she uses a lidocaine cream for pain which is somewhat useful.  Patient denies nausea, vomiting, fever, chills    12/05/2019 She has been wearing compression stockings with ankle brace and relates significant overall improvement.        PCP: Donaldo Pena MD    Date Last Seen by PCP:   Chief Complaint   Patient presents with    Diabetes Mellitus     OV 11/27/19 [facility] PCP    Foot Pain     FOOT AND ANKLE PAIN LEFT /ANKLE BRACE    Nail Care       Hemoglobin A1C   Date Value Ref Range Status   10/21/2019 6.7 (H) 4.0 - 5.6 % Final      Comment:     ADA Screening Guidelines:  5.7-6.4%  Consistent with prediabetes  >or=6.5%  Consistent with diabetes  High levels of fetal hemoglobin interfere with the HbA1C  assay. Heterozygous hemoglobin variants (HbS, HgC, etc)do  not significantly interfere with this assay.   However, presence of multiple variants may affect accuracy.     05/31/2019 5.9 (H) 4.0 - 5.6 % Final     Comment:     ADA Screening Guidelines:  5.7-6.4%  Consistent with prediabetes  >or=6.5%  Consistent with diabetes  High levels of fetal hemoglobin interfere with the HbA1C  assay. Heterozygous hemoglobin variants (HbS, HgC, etc)do  not significantly interfere with this assay.   However, presence of multiple variants may affect accuracy.     01/30/2019 6.6 (H) 4.0 - 5.6 % Final     Comment:     ADA Screening Guidelines:  5.7-6.4%  Consistent with prediabetes  >or=6.5%  Consistent with diabetes  High levels of fetal hemoglobin interfere with the HbA1C  assay. Heterozygous hemoglobin variants (HbS, HgC, etc)do  not significantly interfere with this assay.   However, presence of multiple variants may affect accuracy.             Patient Active Problem List   Diagnosis    Essential hypertension    COPD (chronic obstructive pulmonary disease)    Hypertriglyceridemia    Tobacco abuse    Mild protein malnutrition    Diabetic neuropathy    Leg swelling    Incisional hernia without mention of obstruction or gangrene    DM type 2 without retinopathy    History of DVT (deep vein thrombosis)    History of pulmonary embolus (PE)    Bipolar 1 disorder    Gastroparesis due to DM    Thrombocytosis    Leukocytosis    Morbid obesity    Type 2 diabetes mellitus    Acne    Chronic left-sided low back pain with sciatica    Chronic left-sided low back pain without sciatica    Weakness of left lower extremity    Decreased range of motion of lumbar spine    Other chronic pain    Vomiting and diarrhea    Chronic bilateral low back pain with  left-sided sciatica    Nuclear sclerosis of both eyes    Bilateral ocular hypertension    Refractive error    Chronic left-sided low back pain with left-sided sciatica    Decreased range of motion    Decreased strength, endurance, and mobility    Long term (current) use of anticoagulants    Hypercoagulable state    History of pulmonary embolism    DVT, recurrent, lower extremity, chronic, left       Current Outpatient Medications on File Prior to Visit   Medication Sig Dispense Refill    acetaminophen (ARTHRITIS PAIN RELIEVER) 650 MG TbSR Take 650 mg by mouth. Pt states taking 2 -3 times a day      albuterol (ACCUNEB) 0.63 mg/3 mL Nebu Take 3 mLs (0.63 mg total) by nebulization every 8 (eight) hours as needed (wheezing). Rescue 1 Box 1    aspirin (ECOTRIN) 81 MG EC tablet Take 81 mg by mouth once daily.       azelastine (ASTELIN) 137 mcg (0.1 %) nasal spray 1 spray (137 mcg total) by Nasal route 2 (two) times daily. 30 mL 0    azelastine (ASTELIN) 137 mcg (0.1 %) nasal spray 1 spray (137 mcg total) by Nasal route 2 (two) times daily. 30 mL 0    b complex vitamins tablet Take 1 tablet by mouth once daily. 90 tablet 2    blood-glucose meter kit To check BG once daily, to use with insurance preferred meter 1 each 0    carbamazepine (TEGRETOL) 200 mg tablet TK 2 TS PO BID  1    ciclopirox (LOPROX) 0.77 % Susp Apply topically once daily. 30 mL 3    ciclopirox-nail lacquer removr 8 % Kit Apply 1 application topically once a week. 1 kit 4    clotrimazole (LOTRIMIN) 1 % cream Apply topically 2 (two) times daily. 28 g 1    cyanocobalamin (VITAMIN B-12) 100 MCG tablet Take 1 tablet (100 mcg total) by mouth once daily. 90 tablet 1    DULERA 100-5 mcg/actuation HFAA INHALE 2 PUFFS INTO THE LUNGS TWICE DAILY 13 g 1    DUPIXENT 300 mg/2 mL Syrg inject 300mg SUBCUTANEOUSLY every other week  6    fish oil-omega-3 fatty acids 300-1,000 mg capsule Take 2 capsules by mouth once daily. Instructed to stop 5-7  days before surgery (8/11/16). 60 capsule 5    fluticasone propionate (FLONASE ALLERGY RELIEF) 50 mcg/actuation nasal spray 1 spray (50 mcg total) by Each Nare route 2 (two) times daily. 16 g 3    furosemide (LASIX) 20 MG tablet TAKE 1 TABLET(20 MG) BY MOUTH EVERY DAY 30 tablet 2    gabapentin (NEURONTIN) 600 MG tablet TAKE 1 TABLET(600 MG) BY MOUTH TWICE DAILY 60 tablet 1    hydrOXYzine pamoate (VISTARIL) 25 MG Cap       ibuprofen (ADVIL,MOTRIN) 600 MG tablet TAKE 1 TABLET(600 MG) BY MOUTH EVERY 8 HOURS AS NEEDED FOR PAIN 60 tablet 0    lancets Misc To check BG once daily, to use with insurance preferred meter 30 each 2    lidocaine 3 % Crea Rub on leg for pain as needed no more than 3 times a day 1 Tube 0    lisinopril (PRINIVIL,ZESTRIL) 5 MG tablet TAKE 1 TABLET(5 MG) BY MOUTH EVERY DAY 30 tablet 1    loratadine (CLARITIN) 10 mg tablet Take 1 tablet (10 mg total) by mouth once daily. 30 tablet 5    meloxicam (MOBIC) 15 MG tablet TAKE 1 TABLET(15 MG) BY MOUTH EVERY DAY 30 tablet 2    metFORMIN (GLUCOPHAGE) 1000 MG tablet Take 1 tablet (1,000 mg total) by mouth 2 (two) times daily with meals. 180 tablet 2    methocarbamol (ROBAXIN) 500 MG Tab TAKE 1 TABLET(500 MG) BY MOUTH EVERY 6 HOURS AS NEEDED 100 tablet 0    metoprolol tartrate (LOPRESSOR) 50 MG tablet Take 1 tablet (50 mg total) by mouth 2 (two) times daily. 180 tablet 1    mometasone-formoterol (DULERA) 200-5 mcg/actuation inhaler       multivitamin (THERAGRAN) per tablet Take 1 tablet by mouth every morning. 90 tablet 2    nicotine polacrilex 2 MG Lozg 1-2 per hour in place of cigarettes maximum of 20 per day. 168 lozenge 0    nystatin (MYCOSTATIN) powder Apply topically 2 (two) times daily. 60 g 2    ondansetron (ZOFRAN) 4 MG tablet Take 1 tablet (4 mg total) by mouth every 8 (eight) hours as needed for Nausea. 30 tablet 0    pravastatin (PRAVACHOL) 40 MG tablet TAKE 1 TABLET(40 MG) BY MOUTH EVERY DAY 30 tablet 1     "promethazine-dextromethorphan (PROMETHAZINE-DM) 6.25-15 mg/5 mL Syrp Take 5 mLs by mouth every 4 to 6 hours as needed. 5 mL every 4 to 6 hours; maximum: 30 mL in 24 hours 118 mL 0    risperidone (RISPERDAL) 3 MG Tab take at bedtime  1    VENTOLIN HFA 90 mcg/actuation inhaler INHALE 2 PUFFS INTO THE LUNGS EVERY 6 HOURS AS NEEDED FOR WHEEZING 18 g 0    VYVANSE 50 mg capsule TK ONE C PO QAM  0    [DISCONTINUED] omeprazole 20 mg TbEC TAKE 2 TABLETS BY MOUTH ONCE DAILY AT 6AM 60 each 5    [DISCONTINUED] omeprazole 20 mg TbEC TAKE 2 TABLETS BY MOUTH EVERY DAY AT 6 AM 60 each 5     No current facility-administered medications on file prior to visit.        Review of patient's allergies indicates:   Allergen Reactions    Morphine Other (See Comments)     Patient had a psychotic episode after taking Morphine  Agitation, hallucinations    Penicillins Anaphylaxis     itching       Past Surgical History:   Procedure Laterality Date    ABDOMINAL SURGERY      BILATERAL OOPHORECTOMY Bilateral 2015    Green' s filter Right 2012    Right Neck & Tunneled Down.    HERNIA REPAIR      "[place] of Hernias Repaires around th [place] Button.", pt. states    OOPHORECTOMY      OVARIAN CYST REMOVAL  3/13/2014    [place] REMOVAL OF OVARY/TUBE(S)      SPLENECTOMY, TOTAL  2003    TONSILLECTOMY      as a child    TYMPANOSTOMY TUBE PLACEMENT  1976    VEIN SURGERY      Lt leg       Family History   Problem Relation Age of Onset    Hypertension Father     Diabetes Father     Heart disease Father     Cataracts Father     Diabetes Paternal Grandfather     Heart disease Paternal Grandfather     No Known Problems Mother     Ovarian cancer Maternal Grandmother          from this. ? age     No Known Problems Sister     No Known Problems Brother     No Known Problems Maternal Aunt     No Known Problems Maternal Uncle     No Known Problems Paternal Aunt     No Known Problems Paternal Uncle     No Known Problems " Maternal Grandfather     Ovarian cancer Paternal Grandmother     Uterine cancer Neg Hx     Breast cancer Neg Hx     Colon cancer Neg Hx     Amblyopia Neg Hx     Blindness Neg Hx     Cancer Neg Hx     Glaucoma Neg Hx     Macular degeneration Neg Hx     Retinal detachment Neg Hx     Strabismus Neg Hx     Stroke Neg Hx     Thyroid disease Neg Hx        Social History     Socioeconomic History    Marital status:      Spouse name: Not on file    Number of children: Not on file    Years of education: Not on file    Highest education level: Not on file   Occupational History    Not on file   Social Needs    Financial resource strain: Not on file    Food insecurity:     Worry: Not on file     Inability: Not on file    Transportation needs:     Medical: Not on file     Non-medical: Not on file   Tobacco Use    Smoking status: Current Every Day Smoker     Packs/day: 0.50     Years: 25.00     Pack years: 12.50     Types: Cigarettes    Smokeless tobacco: Never Used   Substance and Sexual Activity    Alcohol use: No     Alcohol/week: 0.0 standard drinks    Drug use: No    Sexual activity: Not Currently   Lifestyle    Physical activity:     Days per week: Not on file     Minutes per session: Not on file    Stress: Not on file   Relationships    Social connections:     Talks on phone: Not on file     Gets together: Not on file     Attends Gnosticism service: Not on file     Active member of club or organization: Not on file     Attends meetings of clubs or organizations: Not on file     Relationship status: Not on file   Other Topics Concern    Not on file   Social History Narrative    Not on file       Review of Systems   Constitution: Negative for chills and fever.   Cardiovascular: Positive for leg swelling. Negative for claudication.   Respiratory: Negative for cough and shortness of breath.    Skin: Positive for dry skin and nail changes. Negative for itching and rash.   Musculoskeletal:  "Positive for arthritis, back pain, joint pain and myalgias. Negative for joint swelling and muscle weakness.   Gastrointestinal: Negative for diarrhea, nausea and vomiting.   Neurological: Positive for numbness and paresthesias. Negative for tremors and weakness.   Psychiatric/Behavioral: Negative for altered mental status and hallucinations.           Objective:      Vitals:    12/05/19 1449   BP: 137/77   Pulse: 95   Weight: 100.1 kg (220 lb 10.9 oz)   Height: 5' 5" (1.651 m)   PainSc:   5       Physical Exam   Constitutional:  Non-toxic appearance. She does not have a sickly appearance. No distress.   Cardiovascular:   Pulses:       Dorsalis pedis pulses are 2+ on the right side, and 2+ on the left side.        Posterior tibial pulses are 2+ on the right side, and 2+ on the left side.   Pulmonary/Chest: No respiratory distress.   Musculoskeletal:        Right ankle: She exhibits decreased range of motion and swelling. No tenderness. No lateral malleolus, no medial malleolus, no AITFL, no CF ligament and no posterior TFL tenderness found. Achilles tendon exhibits no pain, no defect and normal Paniagua's test results.        Left ankle: She exhibits decreased range of motion and swelling. Tenderness (anterior shin pain). No lateral malleolus, no medial malleolus, no AITFL, no CF ligament, no posterior TFL and no proximal fibula tenderness found. Achilles tendon exhibits no pain, no defect and normal Paniagua's test results.        Right foot: There is no bony tenderness.        Left foot: There is tenderness (sub 2nd-4th MTPJ) and swelling.   Biomechanical exam: There is equinus deformity bilateral with decreased dorsiflexion at the ankle joint bilateral. No tenderness with compression of heel. Negative tinels sign. Gait analysis reveals excessive pronation through midstance and propulsion with early heel off. Shoes reveals lateral heel counter wear bilateral     Decreased first MPJ range of motion both " weightbearing and nonweightbearing, no crepitus observed the first MP joint, + dorsal flag sign. Mild  bunion deformity is observed .    Patient has hammertoes of digits 2-5 bilateral partially reducible without symptom today.     Neurological: She has normal reflexes. She displays no atrophy and no tremor. No sensory deficit. She exhibits normal muscle tone.   Paresthesias, and hyperesthesia bilateral feet at toes with no clearly identified trigger or source.    Lilly-Gilberto 5.07 monofilament is intact bilateral feet. Sharp/dull sensation is also intact Bilateral feet.   Skin: Skin is warm, dry and intact. No bruising, no burn, no laceration, no lesion and no rash noted. She is not diaphoretic. No cyanosis. No pallor. Nails show no clubbing.   Examination of the skin reveals no evidence of significant rashes, open lesions, suspicious appearing nevi or other concerning lesions.      Psychiatric: Her mood appears not anxious. Her affect is not inappropriate. Her speech is not slurred. She is not combative. She is communicative. She is attentive.   Nursing note reviewed.            Assessment:       Encounter Diagnoses   Name Primary?    Type II diabetes mellitus with neurological manifestations Yes    Hallux limitus, acquired, right     Hallux limitus, acquired, left     Hammer toes of both feet     Hallux abducto valgus, left     Inversion sprain of ankle, left, subsequent encounter     Peroneus brevis tendonitis, left          Plan:       Audrey was seen today for diabetes mellitus, foot pain and nail care.    Diagnoses and all orders for this visit:    Type II diabetes mellitus with neurological manifestations    Hallux limitus, acquired, right    Hallux limitus, acquired, left    Hammer toes of both feet    Hallux abducto valgus, left    Inversion sprain of ankle, left, subsequent encounter    Peroneus brevis tendonitis, left      I counseled the patient on her conditions, their implications and  medical management.       Greater than 50% of this visit spent on counseling and coordination of care.    Education about the diabetic foot, neuropathy, and prevention of limb loss.    Shoe inspection. Diabetic Foot Education. Patient reminded of the importance of good nutrition/healthy diet/weight management and blood sugar control to help prevent podiatric complications of diabetes. Patient instructed on proper foot hygeine. Wear comfortable, proper fitting shoes. Wash feet daily. Dry well. After drying, apply moisturizer to feet (no lotion to webspaces). Inspect feet daily for skin breaks, blisters, swelling, or redness. Wear cotton socks (preferably white)  Change socks every day. Do NOT walk barefoot. Do NOT use heating pads or hot water soaks. We discussed wearing proper shoe gear, daily foot inspections, never walking without protective shoe gear.     Discussed foot and ankle sprains and importance of immobilization for healing as well as the lengthy course of treatment for complete resolution.    She is to continue in Ankle brace for immobilization    Patient instructed to Cut down excessive ambulation and allow your legs to rest and the injury to heal.     Discussed wearing appropriate shoe gear and avoiding flats, slippers, sandals, and going barefoot. My recommendation for OTC supports is [facility].  Advised patient on wearing compression wraps, tights during activity. Advised patient to warm up before and after exercise or increased activity. Gradually return to exercise level; careful not to overtrain.    She will continue to monitor the areas daily, inspect his feet, wear protective shoe gear when ambulatory, moisturizer to maintain skin integrity and follow in this office in approximately 6 months, sooner p.r.n.

## 2019-12-05 NOTE — TELEPHONE ENCOUNTER
Spoke with pt. Pt request for omeprazole to be changed to something else, its no longer covered by insurance company. Dr. Pena notified. Pt verbalizes understanding.

## 2019-12-05 NOTE — TELEPHONE ENCOUNTER
----- Message from Gabion Tierney LPN sent at 12/5/2019  1:20 PM CST -----  Contact: Self  Pt currently prescribed omeprazole 20mg TbEC, 2 tabs(40mg) po daily. But states her insurance no longer covers that medication. Pt would like something else. Please advise.  ----- Message -----  From: Mariela Plasencia  Sent: 12/5/2019  11:55 AM CST  To: Becky Fulton Staff    Type: RX Refill Request    Who Called: Self    Have you contacted your pharmacy:Yes    Refill or New Rx:Refill    RX Name and Strength:omeprazole 20 mg TbEC    How is the patient currently taking it? (2XDay)    Is this a 30 day or 90 day RX:30    Preferred Pharm[facility] with phone number[facility]   [id number] [place] [address] AT [address]  [place] [address]  Phone: [phone] Fax: [phone]    Best Call Back Number:[phone]    Additional Information: She states she needs to be prescribed something else due to insurance not covering meds or she needs PA

## 2019-12-05 NOTE — PATIENT INSTRUCTIONS
.Recommend lotions: eucerin, eucerin for diabetics, aquaphor, A&D ointment, gold bond for diabetics, sween, Surya's Bees all purpose baby ointment,  urea 40 with aloe (found on amazon.com)    Shoe recommendations: (try 6pm.com, zappos.com , nordstromrack.[employer] or shoes.[employer] for discounted prices) you can visit [employer] in [place]  or [employer] in the [facility] (there are also several shoe brand outlets [place] [facility]    Asics (GT 2000 or gel foundations), new balance stability type shoes (such as the 940 series), saucony (stabil c3),  Shi (GTS or Beast or transcend), propet (tennis shoe)    Sofft Brand (women) Steven&Tee (men), clarks, crocs, aerosoles, naturalizers, SAS, ecco, born, jason gurrola, rockports (dress shoes)    Vionic, burkenstocks, fitflops, propet (sandals)  Nike comfort thong sandals, crocs, propet (house shoes)    Nail Home remedy:  Vicks Vapor rub to nails for easier manageability      Occasional soaks for 15-20 mins in luke warm water with 1 cup of listerine and 1 cup of apple cider vinegar are ok You may add several drops of oil of oregano or tea tree oil as well      Wearing Proper Shoes                    You walk on your feet every day, forcing them to support the weight of your body. Repeated stress on your feet can cause damage over time. The right shoes can help protect your feet. The wrong shoes can cause more foot problems. Read the information below to help you find a shoe that fits your foot needs.      A good shoe fit will cover your foot outline. A shoe that doesnt cover the outline is a bad fit.   Whats your foot shape?  To get a good fit, you need to know the shape of your foot. Do this simple test: While standing, place your foot on a piece of paper and trace around it. Is your foot straight or curved? Do you have a foot problem, such as a bunion, that causes your foot outline to show a bulge on the side of your big toe?  Finding your fit  Bring your foot outline to  the shoe store to help you find the right shoe. Place a shoe you like on top of the outline to see if it matches the shape. The shoe should cover the outline. (If you have a bunion, the shoe may not cover the bulge on the outline. Look for soft leather shoes to stretch over the bunion.) Once youve found a pair of proper shoes, put them on. Walk around. Be sure the shoes dont rub or pinch. If the shoes feel good, youve found your fit!  The right shoe for you  A good shoe has features that provide comfort and support. It must also be the right size and shape for your feet. Look for a shoe made of breathable fabric and lining, such as leather or canvas. Be sure that shoes have enough tread to prevent slipping. Go to a good shoe store for help finding the right shoe.  Good shoe features  An ideal shoe has the following:  · Laces for support. If tying laces is a problem for you, try shoes with Velcro fasteners or jairon.  · A front of the shoe (toe box) with ½ inch space in front of your longest toes.  · An arch shape that supports your foot.  · No more than 1½ inches of heel.  · A stiff, snug back of the shoe to keep your foot from sliding around.  · A smooth lining with no rough seams.  Shoe shopping tips  Below are some dos and donts for when you go to the shoe store.  Do:  · Select the shoes that feel right. Wear them around the house. Then bring them to your foot healthcare provider to check for fit. If they dont fit well, return them.  · Shop late in the day, when your feet will be slightly bigger.  · Each time you buy shoes, have both your feet measured while you are standing. Foot size changes with time.  · Pick shoes to suit their purpose. High heels are OK for an occasional night on the town. But for everyday wear, choose a more sensible shoe.  · Try on shoes while wearing any inserts specially made for your feet (orthoses).  · Try on both the right and left shoes. If your feet are different sizes, pick  a pair that fits the larger foot.  Dont:  · Dont buy shoes based on shoe size alone. Always try on shoes, as sizes differ from brand to brand and within brands.  · Dont expect shoes to break in. If they dont fit at the store, dont buy them.  · Dont buy a shoe that doesnt match your foot shape.  What about socks?  Always wear socks with shoes. Socks help absorb sweat and reduce friction and blistering. When shopping for shoes, choose soft, padded socks with seams that dont irritate your feet.  If you have foot problems  Some foot problems cause deformities. This can make it hard to find a good fit. Look for shoes made of soft leather to stretch over the deformity. If you have bunions, buy shoes with a wider toe box. To fit hammertoes, look for shoes with a tall toe box. If you have arch problems, you may need inserts. In some cases, youll need to have custom footwear or orthoses made for your feet.  Suggested footwear  Ask your healthcare provider what kind of footwear you need. He or she may recommend a certain brand or shoe store.  Date Last Reviewed: 8/1/2016  © 9524-7704 [employer]. [address] [place] [address]. All rights reserved. This information is not intended as a substitute for professional medical care. Always follow your healthcare professional's instructions.        Step-by-Step:  Inspecting Your Feet   Date Last Reviewed: 10/1/2016  © 9473-6065 [employer]. [address] [place] [address]. All rights reserved. This information is not intended as a substitute for professional medical care. Always follow your healthcare professional's instructions.

## 2019-12-12 ENCOUNTER — OFFICE VISIT (OUTPATIENT)
Dept: VASCULAR SURGERY | Facility: CLINIC | Age: 49
End: 2019-12-12
Payer: MEDICAID

## 2019-12-12 ENCOUNTER — HOSPITAL ENCOUNTER (OUTPATIENT)
Dept: CARDIOLOGY | Facility: HOSPITAL | Age: 49
Discharge: HOME | End: 2019-12-12
Attending: SURGERY
Payer: MEDICAID

## 2019-12-12 VITALS
DIASTOLIC BLOOD PRESSURE: 76 MMHG | WEIGHT: 239.31 LBS | BODY MASS INDEX: 39.87 KG/M2 | HEART RATE: 97 BPM | HEIGHT: 65 IN | SYSTOLIC BLOOD PRESSURE: 156 MMHG

## 2019-12-12 DIAGNOSIS — I87.303 VENOUS HYPERTENSION OF BOTH LOWER EXTREMITIES: ICD-10-CM

## 2019-12-12 DIAGNOSIS — I82.542 CHRONIC DEEP VEIN THROMBOSIS (DVT) OF TIBIAL VEIN OF LEFT LOWER EXTREMITY: Primary | ICD-10-CM

## 2019-12-12 PROCEDURE — 99214 OFFICE O/P EST MOD 30 MIN: CPT | Mod: S$PBB,,, | Performed by: SURGERY

## 2019-12-12 PROCEDURE — 93971 EXTREMITY STUDY: CPT | Mod: 26,RT,, | Performed by: SURGERY

## 2019-12-12 PROCEDURE — 99999 PR PBB SHADOW E&M-EST. PATIENT-LVL III: ICD-10-PCS | Mod: PBBFAC,,, | Performed by: SURGERY

## 2019-12-12 PROCEDURE — 93971 EXTREMITY STUDY: CPT | Mod: TC,RT

## 2019-12-12 PROCEDURE — 93971 CV US LOWER VENOUS INSUFFICIENCY RIGHT: ICD-10-PCS | Mod: 26,RT,, | Performed by: SURGERY

## 2019-12-12 PROCEDURE — 99214 PR OFFICE/OUTPT VISIT, EST, LEVL IV, 30-39 MIN: ICD-10-PCS | Mod: S$PBB,,, | Performed by: SURGERY

## 2019-12-12 PROCEDURE — 99999 PR PBB SHADOW E&M-EST. PATIENT-LVL III: CPT | Mod: PBBFAC,,, | Performed by: SURGERY

## 2019-12-12 PROCEDURE — 99213 OFFICE O/P EST LOW 20 MIN: CPT | Mod: PBBFAC,25 | Performed by: SURGERY

## 2019-12-12 NOTE — PROGRESS NOTES
"       Tone Mata MD [facility]                         12/12/2019    HPI:  Audrey Natarajan is a 49 y.o. female with   Patient Active Problem List   Diagnosis    Essential hypertension    COPD (chronic obstructive pulmonary disease)    Hypertriglyceridemia    Tobacco abuse    Mild protein malnutrition    Diabetic neuropathy    Leg swelling    Incisional hernia without mention of obstruction or gangrene    DM type 2 without retinopathy    History of DVT (deep vein thrombosis)    History of pulmonary embolus (PE)    Bipolar 1 disorder    Gastroparesis due to DM    Thrombocytosis    Leukocytosis    Morbid obesity    Type 2 diabetes mellitus    Acne    Chronic left-sided low back pain with sciatica    Chronic left-sided low back pain without sciatica    Weakness of left lower extremity    Decreased range of motion of lumbar spine    Other chronic pain    Vomiting and diarrhea    Chronic bilateral low back pain with left-sided sciatica    Nuclear sclerosis of both eyes    Bilateral ocular hypertension    Refractive error    Chronic left-sided low back pain with left-sided sciatica    Decreased range of motion    Decreased strength, endurance, and mobility    Long term (current) use of anticoagulants    Hypercoagulable state    History of pulmonary embolism    DVT, recurrent, lower extremity, chronic, left    being managed by PCP and specialists who is here today for evaluation of BLE pain.  9/1/19 presented to ER due to RLE cramping and LLE edema.  S/p LLE DVT 2003, unprovoked and treated with anticoagulation.  S/p PE and L femoral and PT DVT 2013 and had a Bard retrievable filter placed 2013; has had persistent pain and edema since that time.  Repeat US 9/1/19 in ER due to pain/edema showed chronic L PT.  Pt states 2013 after she injured her LLE and had a bleeding vein she had a "vein stripping" to the outside of her LLE.  Patient " "states location is BLE occurring for 6 yrs.  Associated signs and symptoms include discoloration.  Quality is sharp/throbbing and severity is 10/10 at worst, average 7/10.  Symptoms began 6 yrs ago.  Alleviating factors include elevation.  Worsening factors include dependency.  Denies claudication.      no MI  no Stroke  Tobacco use: 3 cig/day; prev 1 ppd x 35 yrs    Interval history: c/o severe LLE pain.  +compression daily.  C/o stockings being too tight.  States bilateral foot paresthesias.      Past Medical History:   Diagnosis Date    ADHD (attention deficit hyperactivity disorder)     Arthritis     Asthma     Bipolar 1 disorder     Cataract     COPD (chronic obstructive pulmonary disease)     Coronary artery disease     A fib    Depression     bipolar manic depresson    Diabetes mellitus     DVT of lower extremity, bilateral July 2013    bilateral LE DVT. Argyle filter placed.     Encounter for blood transfusion     History of blood clots 1. Left Leg=2003; 2.Bilateral Groin=Blood Clots= 5 or 6/ 2013 & 7/2013; 3. LLL of Lung=7/2013;  4. Lt. Lower Leg=7/2013.     Pt. had 1st Blood Clot after [id number]=[id number], & Last=2013. Argyle Filter= Rt.Lateral Neck.    HTN (hypertension) 6/6/2013    Pt states that she does not have hypertension    Hypercholesteremia     Irregular heartbeat     Neuromuscular disorder     neuropathy feet    PE (pulmonary embolism) July 2013     bilat LE DVT.     Restless leg syndrome      Past Surgical History:   Procedure Laterality Date    ABDOMINAL SURGERY      BILATERAL OOPHORECTOMY Bilateral 1/12/2015    Green' s filter Right 7/4/2012    Right Neck & Tunneled Down.    HERNIA REPAIR      "[place] [facility] Hern[facility] around th [place].", pt. states    OOPHORECTOMY      OVARIAN CYST REMOVAL  3/13/2014    IN REMOVAL OF OVARY/TUBE(S)      SPLENECTOMY, TOTAL  July 2003    TONSILLECTOMY      as a child    TYMPANOSTOMY TUBE PLACEMENT  1976    VEIN SURGERY  "     Lt leg     Family History   Problem Relation Age of Onset    Hypertension Father     Diabetes Father     Heart disease Father     Cataracts Father     Diabetes Paternal Grandfather     Heart disease Paternal Grandfather     No Known Problems Mother     Ovarian cancer Maternal Grandmother          from this. ? age     No Known Problems Sister     No Known Problems Brother     No Known Problems Maternal Aunt     No Known Problems Maternal Uncle     No Known Problems Paternal Aunt     No Known Problems Paternal Uncle     No Known Problems Maternal Grandfather     Ovarian cancer Paternal Grandmother     Uterine cancer Neg Hx     Breast cancer Neg Hx     Colon cancer Neg Hx     Amblyopia Neg Hx     Blindness Neg Hx     Cancer Neg Hx     Glaucoma Neg Hx     Macular degeneration Neg Hx     Retinal detachment Neg Hx     Strabismus Neg Hx     Stroke Neg Hx     Thyroid disease Neg Hx      Social History     Socioeconomic History    Marital status:      Spouse name: Not on file    Number of children: Not on file    Years of education: Not on file    Highest education level: Not on file   Occupational History    Not on file   Social Needs    Financial resource strain: Not on file    Food insecurity:     Worry: Not on file     Inability: Not on file    Transportation needs:     Medical: Not on file     Non-medical: Not on file   Tobacco Use    Smoking status: Current Every Day Smoker     Packs/day: 0.50     Years: 25.00     Pack years: 12.50     Types: Cigarettes    Smokeless tobacco: Never Used   Substance and Sexual Activity    Alcohol use: No     Alcohol/week: 0.0 standard drinks    Drug use: No    Sexual activity: Not Currently   Lifestyle    Physical activity:     Days per week: Not on file     Minutes per session: Not on file    Stress: Not on file   Relationships    Social connections:     Talks on phone: Not on file     Gets together: Not on file     Attends  Restorationism service: Not on file     Active member of club or organization: Not on file     Attends meetings of clubs or organizations: Not on file     Relationship status: Not on file   Other Topics Concern    Not on file   Social History Narrative    Not on file       Current Outpatient Medications:     acetaminophen (ARTHRITIS PAIN RELIEVER) 650 MG TbSR, Take 650 mg by mouth. Pt states taking 2 -3 times a day, Disp: , Rfl:     albuterol (ACCUNEB) 0.63 mg/3 mL Nebu, Take 3 mLs (0.63 mg total) by nebulization every 8 (eight) hours as needed (wheezing). Rescue, Disp: 1 Box, Rfl: 1    aspirin (ECOTRIN) 81 MG EC tablet, Take 81 mg by mouth once daily. , Disp: , Rfl:     azelastine (ASTELIN) 137 mcg (0.1 %) nasal spray, 1 spray (137 mcg total) by Nasal route 2 (two) times daily., Disp: 30 mL, Rfl: 0    azelastine (ASTELIN) 137 mcg (0.1 %) nasal spray, 1 spray (137 mcg total) by Nasal route 2 (two) times daily., Disp: 30 mL, Rfl: 0    b complex vitamins tablet, Take 1 tablet by mouth once daily., Disp: 90 tablet, Rfl: 2    carbamazepine (TEGRETOL) 200 mg tablet, TK 2 TS PO BID, Disp: , Rfl: 1    ciclopirox (LOPROX) 0.77 % Susp, Apply topically once daily., Disp: 30 mL, Rfl: 3    ciclopirox-nail lacquer removr 8 % Kit, Apply 1 application topically once a week., Disp: 1 kit, Rfl: 4    clotrimazole (LOTRIMIN) 1 % cream, Apply topically 2 (two) times daily., Disp: 28 g, Rfl: 1    cyanocobalamin (VITAMIN B-12) 100 MCG tablet, Take 1 tablet (100 mcg total) by mouth once daily., Disp: 90 tablet, Rfl: 1    DULERA 100-5 mcg/actuation HFAA, INHALE 2 PUFFS INTO THE LUNGS TWICE DAILY, Disp: 13 g, Rfl: 1    DUPIXENT 300 mg/2 mL Syrg, inject 300mg SUBCUTANEOUSLY every other week, Disp: , Rfl: 6    fish oil-omega-3 fatty acids 300-1,000 mg capsule, Take 2 capsules by mouth once daily. Instructed to stop 5-7 days before surgery (8/11/16)., Disp: 60 capsule, Rfl: 5    fluticasone propionate (FLONASE ALLERGY RELIEF) 50  mcg/actuation nasal spray, 1 spray (50 mcg total) by Each Nare route 2 (two) times daily., Disp: 16 g, Rfl: 3    furosemide (LASIX) 20 MG tablet, TAKE 1 TABLET(20 MG) BY MOUTH EVERY DAY, Disp: 30 tablet, Rfl: 2    gabapentin (NEURONTIN) 600 MG tablet, TAKE 1 TABLET(600 MG) BY MOUTH TWICE DAILY, Disp: 60 tablet, Rfl: 1    hydrOXYzine pamoate (VISTARIL) 25 MG Cap, , Disp: , Rfl:     ibuprofen (ADVIL,MOTRIN) 600 MG tablet, TAKE 1 TABLET(600 MG) BY MOUTH EVERY 8 HOURS AS NEEDED FOR PAIN, Disp: 60 tablet, Rfl: 0    lancets Misc, To check BG once daily, to use with insurance preferred meter, Disp: 30 each, Rfl: 2    lidocaine 3 % Crea, Rub on leg for pain as needed no more than 3 times a day, Disp: 1 Tube, Rfl: 0    lisinopril (PRINIVIL,ZESTRIL) 5 MG tablet, TAKE 1 TABLET(5 MG) BY MOUTH EVERY DAY, Disp: 30 tablet, Rfl: 1    loratadine (CLARITIN) 10 mg tablet, Take 1 tablet (10 mg total) by mouth once daily., Disp: 30 tablet, Rfl: 5    meloxicam (MOBIC) 15 MG tablet, TAKE 1 TABLET(15 MG) BY MOUTH EVERY DAY, Disp: 30 tablet, Rfl: 2    metFORMIN (GLUCOPHAGE) 1000 MG tablet, Take 1 tablet (1,000 mg total) by mouth 2 (two) times daily with meals., Disp: 180 tablet, Rfl: 2    methocarbamol (ROBAXIN) 500 MG Tab, TAKE 1 TABLET(500 MG) BY MOUTH EVERY 6 HOURS AS NEEDED, Disp: 100 tablet, Rfl: 0    metoprolol tartrate (LOPRESSOR) 50 MG tablet, Take 1 tablet (50 mg total) by mouth 2 (two) times daily., Disp: 180 tablet, Rfl: 1    mometasone-formoterol (DULERA) 200-5 mcg/actuation inhaler, , Disp: , Rfl:     multivitamin (THERAGRAN) per tablet, Take 1 tablet by mouth every morning., Disp: 90 tablet, Rfl: 2    nicotine polacrilex 2 MG Lozg, 1-2 per hour in place of cigarettes maximum of 20 per day., Disp: 168 lozenge, Rfl: 0    nystatin (MYCOSTATIN) powder, Apply topically 2 (two) times daily., Disp: 60 g, Rfl: 2    ondansetron (ZOFRAN) 4 MG tablet, Take 1 tablet (4 mg total) by mouth every 8 (eight) hours as needed  for Nausea., Disp: 30 tablet, Rfl: 0    pantoprazole (PROTONIX) 40 MG tablet, Take 1 tablet (40 mg total) by mouth once daily., Disp: 30 tablet, Rfl: 2    pravastatin (PRAVACHOL) 40 MG tablet, TAKE 1 TABLET(40 MG) BY MOUTH EVERY DAY, Disp: 30 tablet, Rfl: 1    promethazine-dextromethorphan (PROMETHAZINE-DM) 6.25-15 mg/5 mL Syrp, Take 5 mLs by mouth every 4 to 6 hours as needed. 5 mL every 4 to 6 hours; maximum: 30 mL in 24 hours, Disp: 118 mL, Rfl: 0    risperidone (RISPERDAL) 3 MG Tab, take at bedtime, Disp: , Rfl: 1    VENTOLIN HFA 90 mcg/actuation inhaler, INHALE 2 PUFFS INTO THE LUNGS EVERY 6 HOURS AS NEEDED FOR WHEEZING, Disp: 18 g, Rfl: 0    VYVANSE 50 mg capsule, TK ONE C PO QAM, Disp: , Rfl: 0    blood-glucose meter kit, To check BG once daily, to use with insurance preferred meter, Disp: 1 each, Rfl: 0    REVIEW OF SYSTEMS:  General: No fevers or chills; ENT: No sore throat; Allergy and Immunology: no persistent infections; Hematological and Lymphatic: No history of bleeding or easy bruising; Endocrine: negative; Respiratory: no cough, shortness of breath, or wheezing; Cardiovascular: no chest pain or dyspnea on exertion; Gastrointestinal: no abdominal pain/back, change in bowel habits, or bloody stools; Genito-Urinary: no dysuria, trouble voiding, or hematuria; Musculoskeletal: negative; Neurological: no TIA or stroke symptoms; Psychiatric: no nervousness, anxiety or depression.    PHYSICAL EXAM:      Pulse: 97         General appearance:  Alert, well-appearing, and in no distress.  Oriented to person, place, and time                    Neurological: Normal speech, no focal findings noted; CN II - XII grossly intact. RLE with sensation to light touch, LLE with sensation to light touch.            Musculoskeletal: Digits/nail without cyanosis/clubbing.  Strength 5/5 BLE.                    Neck: Supple, no significant adenopathy, no carotid bruit can be auscultated                  Chest:  Clear to  auscultation, no wheezes, rales or rhonchi, symmetric air entry. No use of accessory muscles               Cardiac: Normal rate and regular rhythm, S1 and S2 normal            Abdomen: Soft, nontender, nondistended, no masses or organomegaly, no hernia     No rebound tenderness noted; bowel sounds normal     Pulsatile aortic mass is non palpable.     No groin adenopathy      Extremities:        2+ R DP pulse, 2+ L DP pulse     1+ RLE edema, 1+ LLE edema    Skin: RLE without wounds; LLE without wounds    LAB RESULTS:  No results found for: CBC  Lab Results   Component Value Date    LABPROT 9.6 09/01/2019    INR 0.9 09/01/2019     Lab Results   Component Value Date     10/21/2019    K 4.8 10/21/2019     10/21/2019    CO2 31 (H) 10/21/2019     (H) 10/21/2019    BUN 4 (L) 10/21/2019    CREATININE 0.6 10/21/2019    CALCIUM 9.1 10/21/2019    ANIONGAP 8 10/21/2019    EGFRNONAA >60 10/21/2019     Lab Results   Component Value Date    WBC 11.35 10/21/2019    RBC 4.55 10/21/2019    HGB 13.4 10/21/2019    HCT 42.1 10/21/2019    MCV 93 10/21/2019    MCH 29.5 10/21/2019    MCHC 31.8 (L) 10/21/2019    RDW 14.9 (H) 10/21/2019     (H) 10/21/2019    MPV 10.0 10/21/2019    GRAN 4.8 10/21/2019    GRAN 42.6 10/21/2019    LYMPH 4.7 10/21/2019    LYMPH 41.1 10/21/2019    MONO 1.2 (H) 10/21/2019    MONO 10.7 10/21/2019    EOS 0.4 10/21/2019    BASO 0.11 10/21/2019    EOSINOPHIL 3.7 10/21/2019    BASOPHIL 1.0 10/21/2019    DIFFMETHOD Automated 10/21/2019     .  Lab Results   Component Value Date    HGBA1C 6.7 (H) 10/21/2019       IMAGING:  All pertinent imaging has been reviewed and interpreted independently.    Venous US 9/2019: Left 1 of 2 PT vein with thrombus present, similar to previous US     9/16/19 JEYSON:  1. Right lower extremity pressures and waveforms indicate no hemodynamically significant arterial occlusive disease.  2. Left lower extremity pressures and waveforms indicate no hemodynamically significant  arterial occlusive disease.     Venous reflux 12/12/19: RLE with GSV reflux at distal thigh to calf.  No DVT.    IMP/PLAN:  49 y.o. female with   Patient Active Problem List   Diagnosis    Essential hypertension    COPD (chronic obstructive pulmonary disease)    Hypertriglyceridemia    Tobacco abuse    Mild protein malnutrition    Diabetic neuropathy    Leg swelling    Incisional hernia without mention of obstruction or gangrene    DM type 2 without retinopathy    History of DVT (deep vein thrombosis)    History of pulmonary embolus (PE)    Bipolar 1 disorder    Gastroparesis due to DM    Thrombocytosis    Leukocytosis    Morbid obesity    Type 2 diabetes mellitus    Acne    Chronic left-sided low back pain with sciatica    Chronic left-sided low back pain without sciatica    Weakness of left lower extremity    Decreased range of motion of lumbar spine    Other chronic pain    Vomiting and diarrhea    Chronic bilateral low back pain with left-sided sciatica    Nuclear sclerosis of both eyes    Bilateral ocular hypertension    Refractive error    Chronic left-sided low back pain with left-sided sciatica    Decreased range of motion    Decreased strength, endurance, and mobility    Long term (current) use of anticoagulants    Hypercoagulable state    History of pulmonary embolism    DVT, recurrent, lower extremity, chronic, left    being managed by PCP and specialists who is here today for evaluation of BLE pain and edema.    -LLE pain and edema likely due to post thrombotic syndrome, possible propagation of DVT - will repeat venous duplex US; recommend compression with Rx stockings, elevation, dietary changes associated with water and sodium intake discussed at length with patient  -R GSV distal reflux - recommend compression with Rx stockings, elevation, dietary changes associated with water and sodium intake discussed at length with patient  -Cont Podiatry mgmt of foot/toe  pain  -Rec exercise  -Instructed to optimize neuropathy with Dr. Pena - if no improvement rec Neuro and pain mgmt referrals  -Cont Hematology for comprehensive mgmt of thrombotic events - f/u hypercoagulable profile  -RTC 3 mo for further evaluation    I spent 20 minutes evaluating this patient and greater than 50% of the time was spent counseling, coordinator care and discussing the plan of care.  All questions were answered and patient stated understanding with agreement with the above treatment plan.    Tone Mata MD [facility]  Vascular and Endovascular Surgery

## 2019-12-12 NOTE — PATIENT INSTRUCTIONS
Putting on Compression Stockings     Turn the stocking inside-out, then fit it over your toes and heel.          Roll the stocking up your leg.            Once stockings are on, make sure the top of the stocking is about two fingers width below the crease of the knee (or the groin if you wear thigh-high stockings).          Use equipment, such as a stocking melisa, or wear rubber gloves to make it easier to put on compression stockings.         Elastic compression stockings are prescribed to treat many vein problems. Wearing them may be the most important thing you do to manage your symptoms. The stockings fit tightly around your ankle, gradually reducing in pressure as they go up your legs. This helps keep blood flowing to your heart. As a result, swelling is reduced. Your healthcare provider will prescribe stockings at a safe pressure for you. He or she will also tell you how often to wear and remove the stockings. Follow these instructions closely. Also, do not buy or wear compression stockings without first seeing your healthcare provider.  Tips for wear and care  To wear stockings safely and to get the most benefit:  · Wear the length prescribed by your healthcare provider.  · Pull them to the designated height and no farther. Dont let them bunch at the top. This can restrict blood flow and increase swelling.  · Wear the stockings for the amount of time your healthcare provider recommends. Replace them when they start to feel loose. This will likely be every 3 to 6 months.  · Remove them as your healthcare provider directs. When removed, wash your legs. Then check your legs and feet for sores. Call your healthcare provider if you find a sore. Dont put the stockings back on unless your healthcare provider directs.   · Wash the stockings as instructed. They may need to be hand-washed.  Date Last Reviewed: 5/1/2016  © 3791-7595 [employer]. [address] [place] [address], [place] [address]. All rights  reserved. This information is not intended as a substitute for professional medical care. Always follow your healthcare professional's instructions.        Understanding Chronic Venous Insufficiency  Problems with the veins in the legs may lead to chronic venous insufficiency (CVI). CVI means that there is a long-term problem with the veins not being able to pump blood back to your heart. When this happens, blood stays in the legs and causes swelling and aching.   Two problems that may lead to chronic venous insufficiency are:  · Damaged valves. Valves keep blood flowing from the legs through the blood vessels and back to the heart. When the valves are damaged, blood does not flow as well.   · Deep vein thrombosis (DVT). Blood clots may form in the deep veins of the legs. This may cause pain, redness, and swelling in the legs. It may also block the flow of blood back to the heart. Seek immediate medical care if you have these symptoms.  · A blood clot in the leg can also break off and travel to the lungs. This is called pulmonary embolism (PE). In the lungs, the clot can cut off the flow of blood. This may cause chest pain, trouble breathing, sweating, a fast heartbeat, coughing (may cough up blood), and fainting. It is a medical emergency and may cause death. Call 911 if you have these symptoms.  · Healthcare providers call the two conditions, DVT and PE, venous thromboembolism (VTE).  CVI cant be cured, but you can control leg swelling to reduce the likelihood of ulcers (sores).  Recognizing the symptoms  Be aware of the following:  · If you stand or sit with your feet down for long periods, your legs may ache or feel heavy.  · Swollen ankles are possibly the most common symptom of CVI.  · As swelling increases, the skin over your ankles may show red spots or a brownish tinge. The skin may feel leathery or scaly, and may start to itch.  · If swelling is not controlled, an ulcer (open wound) may form.  What you  can do  Reduce your risk of developing ulcers by doing the following:  · Increase blood flow back to your heart by elevating your legs, exercising daily, and wearing elastic stockings.  · Boost blood flow in your legs by losing excess weight.  · If you must stand or sit in one place for a period of time, keep your blood moving by wiggling your toes, shifting your body position, and rising up on the balls of your feet.    Date Last Reviewed: 5/1/2016 © 2000-2017 [employer]. [address] [place] [address], [place] [address]. All rights reserved. This information is not intended as a substitute for professional medical care. Always follow your healthcare professional's instructions.        Tips for Using Less Salt    Most people with heart problems need to eat less salt (sodium). Reducing the amount of salt you eat may help control your blood pressure. The higher your blood pressure, the greater your risk for heart disease, stroke, blindness, and kidney problems.  At the store  · Make low-salt choices by reading labels carefully. Look for the total amount of sodium per serving.  · Use more fresh food. Buy more fruits and vegetables. Select lean meats, fish, and poultry.  · Use fewer frozen, canned, and packaged foods which often contain a lot of sodium.  · Use plain frozen vegetables without sauces or toppings. These products are often low- or no-sodium.  · Opt for reduced-sodium or no-salt-added versions of canned vegetables and soups.  In the kitchen  · Don't add salt to food when you're cooking. Season with flavorings such as onion, garlic, pepper, salt-free herbal blends, and lemon or lime juice.  · Use a cookbook containing low-salt recipes. It can give you ideas for tasty meals that are healthy for your heart.  · Sprinkle salt-free herbal blends on vegetables and meat.  · Drain and rinse canned foods, such as canned beans and vegetables, before cooking or eating.  Eating out  · Tell the  you're on a  low-salt diet. Ask questions about the menu.  · Order fish, chicken, and meat broiled, baked, poached, or grilled without salt, butter, or breading.  · Use lemon, pepper, and salt-free herb mixes to add flavor.  · Choose plain steamed rice, boiled noodles, and baked or boiled potatoes. Top potatoes with chives and a little sour cream.     Beware! Salt goes by many other names. Limit foods with these words listed as ingredients: salt, sodium, soy sauce, baking soda, baking powder, MSG, monosodium, Na (the chemical symbol for sodium). Some antacids are also high in salt.   Date Last Reviewed: 6/19/2015 © 2000-2017 [employer]. [address] [place] [address]. All rights reserved. This information is not intended as a substitute for professional medical care. Always follow your healthcare professional's instructions.        Low-Salt Diet  This diet removes foods that are high in salt. It also limits the amount of salt you use when cooking. It is most often used for people with high blood pressure, edema (fluid retention), and kidney, liver, or heart disease.  Table salt contains the mineral sodium. Your body needs sodium to work normally. But too much sodium can make your health problems worse. Your healthcare provider is recommending a low-salt (also called low-sodium) diet for you. Your total daily allowance of salt is 1,500 to 2,300 milligrams (mg). It is less than 1 teaspoon of table salt. This means you can have only about 500 to 700 mg of sodium at each meal. People with certain health problems should limit salt intake to the lower end of the recommended range.    When you cook, dont add much salt. If you can cook without using salt, even better. Dont add salt to your food at the table.  When shopping, read food labels. Salt is often called sodium on the label. Choose foods that are salt-free, low salt, or very low salt. Note that foods with reduced salt may not lower your salt intake  enough.    Beans, potatoes, and pasta  Ok: Dry beans, split peas, lentils, potatoes, rice, macaroni, pasta, spaghetti without added salt  Avoid: Potato chips, tortilla chips, and similar products  Breads and cereals  Ok: Low-sodium breads, rolls, cereals, and cakes; low-salt crackers, matzo crackers  Avoid: Salted crackers, pretzels, popcorn, Syriac toast, pancakes, muffins  Dairy  Ok: Milk, chocolate milk, hot chocolate mix, low-salt cheeses, and yogurt  Avoid: Processed cheese and cheese spreads; Roquefort, Camembert, and cottage cheese; buttermilk, instant breakfast drink  Desserts  Ok: Ice cream, frozen yogurt, juice bars, gelatin, cookies and pies, sugar, honey, jelly, hard candy  Avoid: Most pies, cakes and cookies prepared or processed with salt; instant pudding  Drinks  Ok: Tea, coffee, fizzy (carbonated) drinks, juices  Avoid: Flavored coffees, electrolyte replacement drinks, sports drinks  Meats  Ok: All fresh meat, fish, poultry, low-salt tuna, eggs, egg substitute  Avoid: Smoked, pickled, brine-cured, or salted meats and fish. This includes chavez, chipped beef, corned beef, hot dogs, deli meats, ham, kosher meats, salt pork, sausage, canned tuna, salted codfish, smoked salmon, herring, sardines, or anchovies.  Seasonings and spices  Ok: Most seasonings are okay. Good substitutes for salt include: fresh herb blends, hot sauce, lemon, garlic, wilcox, vinegar, dry mustard, parsley, cilantro, horseradish, tomato paste, regular margarine, mayonnaise, unsalted butter, cream cheese, vegetable oil, cream, low-salt salad dressing and gravy.  Avoid: Regular ketchup, relishes, pickles, soy sauce, teriyaki sauce, Worcestershire sauce, BBQ sauce, tartar sauce, meat tenderizer, chili sauce, regular gravy, regular salad dressing, salted butter  Soups  Ok: Low-salt soups and broths made with allowed foods  Avoid: Bouillon cubes, soups with smoked or salted meats, regular soup and broth  Vegetables  Ok: Most vegetables  are okay; also low-salt tomato and vegetable juices  Avoid: Sauerkraut and other brine-soaked vegetables; pickles and other pickled vegetables; tomato juice, olives  Date Last Reviewed: 8/1/2016 © 2000-2017 [employer]. [address] [place] [address]. All rights reserved. This information is not intended as a substitute for professional medical care. Always follow your healthcare professional's instructions.        Low-Salt Choices  Eating salt (sodium) can make your body retain too much water. Excess water makes your heart work harder. Canned, packaged, and frozen foods are easy to prepare, but they are often high in sodium. Here are some ideas for low-salt foods you can easily prepare yourself.    For breakfast  · Fruit or 100% fruit juice  · Whole-wheat bread or an English muffin. Compare sodium content on labels.  · Low-fat milk or yogurt  · Unsalted eggs  · Shredded wheat  · Corn tortillas  · Unsalted steamed rice  · Regular (not instant) hot cereal, made without salt  Stay away from:  · Sausage, chavez, and ham  · Flour tortillas  · Packaged muffins, pancakes, and biscuits  · Instant hot cereals  · Cottage cheese  For lunch and dinner  · Fresh fish, chicken, turkey, or meat--baked, broiled, or roasted without salt  · Dry beans, cooked without salt  · Tofu, stir-fried without salt  · Unsalted fresh fruit and vegetables, or frozen or canned fruit and vegetables with no added salt  Stay away from:  · Lunch or deli meat that is cured or smoked  · Cheese  · Tomato juice and catsup  · Canned vegetables, soups, and fish not labeled as no-salt-added or reduced sodium  · Packaged gravies and sauces  · Olives, pickles, and relish  · Bottled salad dressings  For snacks and desserts  · Yogurt  · Unsalted, air popped popcorn  · Unsalted nuts or seeds  Stay away from:  · Pies and cakes  · Packaged dessert mixes  · Pizza  · Canned and packaged puddings  · Pretzels, chips, crackers, and nuts--unless  the label says unsalted  Date Last Reviewed: 6/17/2015  © 7426-5719 [employer]. [address] [place] [address], [place] [address]. All rights reserved. This information is not intended as a substitute for professional medical care. Always follow your healthcare professional's instructions.

## 2019-12-16 ENCOUNTER — HOSPITAL ENCOUNTER (OUTPATIENT)
Dept: CARDIOLOGY | Facility: HOSPITAL | Age: 49
Discharge: HOME | End: 2019-12-16
Attending: SURGERY
Payer: MEDICAID

## 2019-12-16 DIAGNOSIS — I82.542 CHRONIC DEEP VEIN THROMBOSIS (DVT) OF TIBIAL VEIN OF LEFT LOWER EXTREMITY: ICD-10-CM

## 2019-12-16 PROCEDURE — 93971 EXTREMITY STUDY: CPT | Mod: LT

## 2019-12-16 PROCEDURE — 93971 CV US DOPPLER VENOUS LEG LEFT (CUPID ONLY): ICD-10-PCS | Mod: 26,LT,, | Performed by: SURGERY

## 2019-12-16 PROCEDURE — 93971 EXTREMITY STUDY: CPT | Mod: 26,LT,, | Performed by: SURGERY

## 2019-12-17 LAB
RIGHT GREAT SAPHENOUS DISTAL THIGH DIA: 6 CM
RIGHT GREAT SAPHENOUS DISTAL THIGH REFLUX: 3432 MS
RIGHT GREAT SAPHENOUS JUNCTION DIA: 13 CM
RIGHT GREAT SAPHENOUS JUNCTION REFLUX: 0 MS
RIGHT GREAT SAPHENOUS KNEE DIA: 5 CM
RIGHT GREAT SAPHENOUS KNEE REFLUX: 2607 MS
RIGHT GREAT SAPHENOUS MIDDLE THIGH DIA: 6 CM
RIGHT GREAT SAPHENOUS MIDDLE THIGH REFLUX: 0 MS
RIGHT GREAT SAPHENOUS PROXIMAL CALF DIA: 4 CM
RIGHT GREAT SAPHENOUS PROXIMAL CALF REFLUX: 1590 MS
RIGHT SMALL SAPHENOUS KNEE DIA: 3 CM
RIGHT SMALL SAPHENOUS KNEE REFLUX: 0 MS
RIGHT SMALL SAPHENOUS SPJ DIA: 2 CM
RIGHT SMALL SAPHENOUS SPJ REFLUX: 0 MS

## 2019-12-20 DIAGNOSIS — G89.29 CHRONIC MIDLINE LOW BACK PAIN WITHOUT SCIATICA: ICD-10-CM

## 2019-12-20 DIAGNOSIS — M54.50 CHRONIC MIDLINE LOW BACK PAIN WITHOUT SCIATICA: ICD-10-CM

## 2019-12-20 RX ORDER — METHOCARBAMOL 500 MG/1
TABLET, FILM COATED ORAL
Qty: 100 TABLET | Refills: 0 | Status: SHIPPED | OUTPATIENT
Start: 2019-12-20 | End: 2020-01-13

## 2019-12-26 ENCOUNTER — TELEPHONE (OUTPATIENT)
Dept: VASCULAR SURGERY | Facility: CLINIC | Age: 49
End: 2019-12-26

## 2019-12-26 NOTE — TELEPHONE ENCOUNTER
Called to let patient know that she did not have a DVT noted on her ultrasound from earlier this month. She stated understanding. Explained she would follow up with Dr. Mata in 3 mts.

## 2020-01-02 DIAGNOSIS — E11.42 TYPE 2 DIABETES MELLITUS WITH DIABETIC POLYNEUROPATHY, WITHOUT LONG-TERM CURRENT USE OF INSULIN: ICD-10-CM

## 2020-01-02 DIAGNOSIS — M54.32 LEFT SIDED SCIATICA: ICD-10-CM

## 2020-01-02 DIAGNOSIS — I10 ESSENTIAL HYPERTENSION: Chronic | ICD-10-CM

## 2020-01-02 RX ORDER — GABAPENTIN 600 MG/1
TABLET ORAL
Qty: 60 TABLET | Refills: 2 | Status: SHIPPED | OUTPATIENT
Start: 2020-01-02 | End: 2020-04-06

## 2020-01-02 RX ORDER — METOPROLOL TARTRATE 50 MG/1
50 TABLET ORAL 2 TIMES DAILY
Qty: 60 TABLET | Refills: 2 | Status: SHIPPED | OUTPATIENT
Start: 2020-01-02 | End: 2020-10-02 | Stop reason: SDUPTHER

## 2020-01-02 RX ORDER — PRAVASTATIN SODIUM 40 MG/1
TABLET ORAL
Qty: 30 TABLET | Refills: 2 | Status: SHIPPED | OUTPATIENT
Start: 2020-01-02 | End: 2020-04-06

## 2020-01-02 RX ORDER — LISINOPRIL 5 MG/1
TABLET ORAL
Qty: 30 TABLET | Refills: 2 | Status: SHIPPED | OUTPATIENT
Start: 2020-01-02 | End: 2020-04-06

## 2020-01-03 ENCOUNTER — HOSPITAL ENCOUNTER (EMERGENCY)
Facility: HOSPITAL | Age: 50
Discharge: HOME | End: 2020-01-03
Attending: EMERGENCY MEDICINE
Payer: MEDICAID

## 2020-01-03 VITALS
BODY MASS INDEX: 37.77 KG/M2 | HEIGHT: 66 IN | OXYGEN SATURATION: 99 % | TEMPERATURE: 98 F | HEART RATE: 102 BPM | SYSTOLIC BLOOD PRESSURE: 141 MMHG | RESPIRATION RATE: 18 BRPM | DIASTOLIC BLOOD PRESSURE: 65 MMHG | WEIGHT: 235 LBS

## 2020-01-03 DIAGNOSIS — T78.40XA ALLERGIC REACTION TO DRUG, INITIAL ENCOUNTER: Primary | ICD-10-CM

## 2020-01-03 LAB — POCT GLUCOSE: 201 MG/DL (ref 70–110)

## 2020-01-03 PROCEDURE — 82962 GLUCOSE BLOOD TEST: CPT

## 2020-01-03 PROCEDURE — 25000003 PHARM REV CODE 250: Performed by: EMERGENCY MEDICINE

## 2020-01-03 PROCEDURE — 96374 THER/PROPH/DIAG INJ IV PUSH: CPT

## 2020-01-03 PROCEDURE — 96375 TX/PRO/DX INJ NEW DRUG ADDON: CPT

## 2020-01-03 PROCEDURE — 63600175 PHARM REV CODE 636 W HCPCS: Performed by: EMERGENCY MEDICINE

## 2020-01-03 PROCEDURE — S0028 INJECTION, FAMOTIDINE, 20 MG: HCPCS | Performed by: EMERGENCY MEDICINE

## 2020-01-03 PROCEDURE — 99284 EMERGENCY DEPT VISIT MOD MDM: CPT | Mod: 25

## 2020-01-03 RX ORDER — DIPHENHYDRAMINE HCL 50 MG
50 CAPSULE ORAL EVERY 6 HOURS PRN
Qty: 20 CAPSULE | Refills: 0 | Status: ON HOLD | OUTPATIENT
Start: 2020-01-03 | End: 2021-02-20 | Stop reason: HOSPADM

## 2020-01-03 RX ORDER — FAMOTIDINE 10 MG/ML
20 INJECTION INTRAVENOUS
Status: COMPLETED | OUTPATIENT
Start: 2020-01-03 | End: 2020-01-03

## 2020-01-03 RX ORDER — FAMOTIDINE 20 MG/1
20 TABLET, FILM COATED ORAL 2 TIMES DAILY
Qty: 10 TABLET | Refills: 0 | Status: SHIPPED | OUTPATIENT
Start: 2020-01-03 | End: 2020-09-08

## 2020-01-03 RX ORDER — DEXAMETHASONE SODIUM PHOSPHATE 4 MG/ML
8 INJECTION, SOLUTION INTRA-ARTICULAR; INTRALESIONAL; INTRAMUSCULAR; INTRAVENOUS; SOFT TISSUE
Status: COMPLETED | OUTPATIENT
Start: 2020-01-03 | End: 2020-01-03

## 2020-01-03 RX ADMIN — FAMOTIDINE 20 MG: 10 INJECTION INTRAVENOUS at 07:01

## 2020-01-03 RX ADMIN — DEXAMETHASONE SODIUM PHOSPHATE 8 MG: 4 INJECTION, SOLUTION INTRA-ARTICULAR; INTRALESIONAL; INTRAMUSCULAR; INTRAVENOUS; SOFT TISSUE at 07:01

## 2020-01-03 NOTE — DISCHARGE INSTRUCTIONS
Please avoid penicillin.  Please return immediately if you get worse or if new problems develop.  Please follow-up with your primary care doctor this week.  Rest.  Please take Benadryl, 50 mg every 6 hr for 2 days and then as needed for itching and swelling. Please take Pepcid as directed.  Return immediately if you get worse or if new problems develop.

## 2020-01-03 NOTE — ED PROVIDER NOTES
"Encounter Date: 1/3/2020    SCRIBE #1 NOTE: I, Jose Marsh, am scribing for, and in the presence of,  Shaggy Sanchez MD. I have scribed the following portions of the note - Other sections scribed: HPI, ROS.       History     Chief Complaint   Patient presents with    Allergic Reaction     pt states she was seen by her dentist yesterday for a broken tooth and prescribed amoxicillin, pt has a hx of PCN allergy. states she took first pill this morning and within 15 minutes began to break out in rash. on arrival pt states she is "a little better" she took benadryl at home and EMS gave 25 mg IV      49 y.o F with a hx of Asthma, Bipolar 1 disorder, COPD, CAD, DM, DVT, HTN, Hypercholesteremia,Tachycardia, Neuromuscular disorder and PE presents to the ED via EMS c/o an allergic reaction after taking Amoxicillin x1 hour ago. She reports acute onset of SOB, generalized rash and throat swelling x10 minutes after taking the amoxicillin. She also reports emesis x2 and a frontal headache. The pt reports allergies to Penicillin and Morphine. She attempted tx with Benadryl immediately after the onset of her symptoms with no relief. EMS administered 25mg of IV Benadryl which did improve her symptoms. She denies fever, chills, diaphoresis, chest pain, back pain, abdominal pain, difficulty urinating and voice change.    The history is provided by the patient.     Review of patient's allergies indicates:   Allergen Reactions    Morphine Other (See Comments)     Patient had a psychotic episode after taking Morphine  Agitation, hallucinations    Penicillins Anaphylaxis     itching     Past Medical History:   Diagnosis Date    ADHD (attention deficit hyperactivity disorder)     Arthritis     Asthma     Bipolar 1 disorder     Cataract     COPD (chronic obstructive pulmonary disease)     Coronary artery disease     A fib    Depression     bipolar manic depresson    Diabetes mellitus     DVT of lower extremity, bilateral July " "    bilateral LE DVT. Estelita filter placed.     Encounter for blood transfusion     History of blood clots 1. Left Leg=; 2.Bilateral Groin=Blood Clots=  or 2013 & 2013; 3. LLL of Lung=2013;  4. Lt. Lower Leg=2013.     Pt. had 1st Blood Clot after Eaovzetajnyb=[id number], & Last=. [place] Filter= Rt.Lateral Neck.    HTN (hypertension) 2013    Pt states that she does not have hypertension    Hypercholesteremia     Irregular heartbeat     Neuromuscular disorder     neuropathy feet    PE (pulmonary embolism) 2013     bilat LE DVT.     Restless leg syndrome      Past Surgical History:   Procedure Laterality Date    ABDOMINAL SURGERY      BILATERAL OOPHORECTOMY Bilateral 2015    Green' s filter Right 2012    Right Neck & Tunneled Down.    HERNIA REPAIR      "[place] [facility] Hernias Repaires around th [place].", pt. states    OOPHORECTOMY      OVARIAN CYST REMOVAL  3/13/2014    PA REMOVAL OF OVARY/TUBE(S)      SPLENECTOMY, TOTAL  2003    TONSILLECTOMY      as a child    TYMPANOSTOMY TUBE PLACEMENT  1976    VEIN SURGERY      Lt leg     Family History   Problem Relation Age of Onset    Hypertension Father     Diabetes Father     Heart disease Father     Cataracts Father     Diabetes Paternal Grandfather     Heart disease Paternal Grandfather     No Known Problems Mother     Ovarian cancer Maternal Grandmother          from this. ? age     No Known Problems Sister     No Known Problems Brother     No Known Problems Maternal Aunt     No Known Problems Maternal Uncle     No Known Problems Paternal Aunt     No Known Problems Paternal Uncle     No Known Problems Maternal Grandfather     Ovarian cancer Paternal Grandmother     Uterine cancer Neg Hx     Breast cancer Neg Hx     Colon cancer Neg Hx     Amblyopia Neg Hx     Blindness Neg Hx     Cancer Neg Hx     Glaucoma Neg Hx     Macular degeneration Neg Hx     Retinal detachment Neg Hx  "    Strabismus Neg Hx     Stroke Neg Hx     Thyroid disease Neg Hx      Social History     Tobacco Use    Smoking status: Current Every Day Smoker     Packs/day: 0.50     Years: 25.00     Pack years: 12.50     Types: Cigarettes    Smokeless tobacco: Never Used   Substance Use Topics    Alcohol use: No     Alcohol/week: 0.0 standard drinks    Drug use: No     Review of Systems   Constitutional: Negative for chills and fever.   HENT: Negative for congestion, ear pain, rhinorrhea, sore throat and voice change.         (+) throat swelling   Eyes: Negative for pain and visual disturbance.   Respiratory: Positive for shortness of breath. Negative for cough.    Cardiovascular: Negative for chest pain.   Gastrointestinal: Positive for vomiting. Negative for abdominal pain, diarrhea and nausea.   Genitourinary: Negative for dysuria.   Musculoskeletal: Negative for back pain and neck pain.   Skin: Positive for rash.   Neurological: Positive for headaches.       Physical Exam     Initial Vitals [01/03/20 0650]   BP Pulse Resp Temp SpO2   (!) 150/90 110 16 97.6 °F (36.4 °C) 97 %      MAP       --         Physical Exam  The patient was examined specifically for the following:   General:No significant distress, Good color, Warm and dry. Head and neck:Scalp atraumatic, Neck supple. Neurological:Appropriate conversation, Gross motor deficits. Eyes:Conjugate gaze, Clear corneas. ENT: No epistaxis. Cardiac: Regular rate and rhythm, Grossly normal heart tones. Pulmonary: Wheezing, Rales. Gastrointestinal: Abdominal tenderness, Abdominal distention. Musculoskeletal: Extremity deformity, Apparent pain with range of motion of the joints. Skin: Rash.   The findings on examination were normal except for the following:  The patient has slightly pink skin.  There is no swelling in the mouth or posterior pharynx.  The patient has a normal voice.  She has no difficulty swallowing her secretions.  Lungs are clear and free of wheezing.  I  see no seamus urticaria at this time.  ED Course   Procedures  Labs Reviewed   POCT GLUCOSE - Abnormal; Notable for the following components:       Result Value    POCT Glucose 201 (*)     All other components within normal limits          Imaging Results    None                     Scribe Attestation:   Scribe #1: I performed the above scribed service and the documentation accurately describes the services I performed. I attest to the accuracy of the note.     Medical decision making:  Given the above this patient presents to the emergency room with an allergic reaction to penicillin.  The patient has a known penicillin allergy.  She took amoxicillin today.  I find no threat to her airway.  There is no angioedema.  I find no bronchospasm.  Vital signs are stable. The patient was treated with 1 dose of steroid.  I will have her continue Pepcid and Benadryl.  I will have her return if she gets worse or if new problems develop.  Please avoid penicillins.                      Clinical Impression:       ICD-10-CM ICD-9-CM   1. Allergic reaction to drug, initial encounter T78.40XA 995.27              I personally performed the services described in this documentation.  All medical record  entries made by the scribe are at my direction and in my presence.  Signed, Dr. Laura Sanchez MD  01/03/20 0223

## 2020-01-03 NOTE — ED TRIAGE NOTES
Patient was prescribed amoxicillin yesterday for broken tooth. She took her 1st dose this am and within 15 minutes she started feeling warm. Then she started having difficulty breathing. She took one benadryl 25mg and called 911. Then she began vomiting.

## 2020-01-12 DIAGNOSIS — M54.50 CHRONIC MIDLINE LOW BACK PAIN WITHOUT SCIATICA: ICD-10-CM

## 2020-01-12 DIAGNOSIS — G89.29 CHRONIC MIDLINE LOW BACK PAIN WITHOUT SCIATICA: ICD-10-CM

## 2020-01-13 RX ORDER — METHOCARBAMOL 500 MG/1
TABLET, FILM COATED ORAL
Qty: 100 TABLET | Refills: 0 | Status: SHIPPED | OUTPATIENT
Start: 2020-01-13 | End: 2020-02-13

## 2020-01-16 DIAGNOSIS — G89.29 CHRONIC LEFT-SIDED LOW BACK PAIN WITHOUT SCIATICA: ICD-10-CM

## 2020-01-16 DIAGNOSIS — M54.50 CHRONIC LEFT-SIDED LOW BACK PAIN WITHOUT SCIATICA: ICD-10-CM

## 2020-01-16 RX ORDER — IBUPROFEN 600 MG/1
TABLET ORAL
Qty: 60 TABLET | Refills: 1 | Status: SHIPPED | OUTPATIENT
Start: 2020-01-16 | End: 2020-07-06

## 2020-01-21 DIAGNOSIS — J44.9 CHRONIC OBSTRUCTIVE PULMONARY DISEASE, UNSPECIFIED COPD TYPE: Chronic | ICD-10-CM

## 2020-01-21 RX ORDER — ALBUTEROL SULFATE 90 UG/1
AEROSOL, METERED RESPIRATORY (INHALATION)
Qty: 18 G | Refills: 0 | Status: SHIPPED | OUTPATIENT
Start: 2020-01-21 | End: 2020-01-31 | Stop reason: ALTCHOICE

## 2020-01-31 ENCOUNTER — OFFICE VISIT (OUTPATIENT)
Dept: URGENT CARE | Facility: CLINIC | Age: 50
End: 2020-01-31
Payer: MEDICAID

## 2020-01-31 ENCOUNTER — TELEPHONE (OUTPATIENT)
Dept: FAMILY MEDICINE | Facility: CLINIC | Age: 50
End: 2020-01-31

## 2020-01-31 VITALS
OXYGEN SATURATION: 95 % | TEMPERATURE: 99 F | DIASTOLIC BLOOD PRESSURE: 80 MMHG | WEIGHT: 235 LBS | SYSTOLIC BLOOD PRESSURE: 140 MMHG | HEART RATE: 98 BPM | BODY MASS INDEX: 37.77 KG/M2 | HEIGHT: 66 IN

## 2020-01-31 DIAGNOSIS — R05.9 COUGH: ICD-10-CM

## 2020-01-31 DIAGNOSIS — B96.89 ACUTE BACTERIAL BRONCHITIS: Primary | ICD-10-CM

## 2020-01-31 DIAGNOSIS — J20.8 ACUTE BACTERIAL BRONCHITIS: Primary | ICD-10-CM

## 2020-01-31 DIAGNOSIS — R50.9 FEVER, UNSPECIFIED FEVER CAUSE: ICD-10-CM

## 2020-01-31 LAB
CTP QC/QA: YES
FLUAV AG NPH QL: NEGATIVE
FLUBV AG NPH QL: NEGATIVE

## 2020-01-31 PROCEDURE — 71046 X-RAY EXAM CHEST 2 VIEWS: CPT | Mod: S$GLB,,, | Performed by: RADIOLOGY

## 2020-01-31 PROCEDURE — 87804 INFLUENZA ASSAY W/OPTIC: CPT | Mod: QW,S$GLB,, | Performed by: PHYSICIAN ASSISTANT

## 2020-01-31 PROCEDURE — 99214 OFFICE O/P EST MOD 30 MIN: CPT | Mod: 25,S$GLB,, | Performed by: PHYSICIAN ASSISTANT

## 2020-01-31 PROCEDURE — 87804 POCT INFLUENZA A/B: ICD-10-PCS | Mod: 59,QW,S$GLB, | Performed by: PHYSICIAN ASSISTANT

## 2020-01-31 PROCEDURE — 71046 XR CHEST PA AND LATERAL: ICD-10-PCS | Mod: S$GLB,,, | Performed by: RADIOLOGY

## 2020-01-31 PROCEDURE — 99214 PR OFFICE/OUTPT VISIT, EST, LEVL IV, 30-39 MIN: ICD-10-PCS | Mod: 25,S$GLB,, | Performed by: PHYSICIAN ASSISTANT

## 2020-01-31 RX ORDER — PROMETHAZINE HYDROCHLORIDE AND DEXTROMETHORPHAN HYDROBROMIDE 6.25; 15 MG/5ML; MG/5ML
5 SYRUP ORAL NIGHTLY PRN
Qty: 150 ML | Refills: 0 | Status: SHIPPED | OUTPATIENT
Start: 2020-01-31 | End: 2020-02-10

## 2020-01-31 RX ORDER — AZITHROMYCIN 250 MG/1
250 TABLET, FILM COATED ORAL DAILY
Qty: 6 TABLET | Refills: 0 | Status: SHIPPED | OUTPATIENT
Start: 2020-01-31 | End: 2020-02-06

## 2020-01-31 RX ORDER — ALBUTEROL SULFATE 90 UG/1
2 AEROSOL, METERED RESPIRATORY (INHALATION) EVERY 6 HOURS PRN
Qty: 18 G | Refills: 0 | Status: SHIPPED | OUTPATIENT
Start: 2020-01-31 | End: 2020-04-07 | Stop reason: SDUPTHER

## 2020-01-31 RX ORDER — DEXAMETHASONE SODIUM PHOSPHATE 100 MG/10ML
10 INJECTION INTRAMUSCULAR; INTRAVENOUS
Status: COMPLETED | OUTPATIENT
Start: 2020-01-31 | End: 2020-01-31

## 2020-01-31 RX ADMIN — DEXAMETHASONE SODIUM PHOSPHATE 10 MG: 100 INJECTION INTRAMUSCULAR; INTRAVENOUS at 06:01

## 2020-01-31 NOTE — TELEPHONE ENCOUNTER
----- Message from Margarita Goodman sent at 1/31/2020 10:31 AM CST -----  Contact: pt  Type: Patient Call Back    Who called:pt    What is the request in detail:requesting mammo orders. Call pt    Can the clinic reply by [facility]?    Would the patient rather a call back or a response via [facility]? call    Best call back number:[phone]    Additional Information:

## 2020-01-31 NOTE — PROGRESS NOTES
"Subjective:       Patient ID: Audrey Natarajan is a 49 y.o. female.    Vitals:  height is 5' 6" (1.676 m) and weight is 106.6 kg (235 lb). Her temperature is 98.8 °F (37.1 °C). Her blood pressure is 140/80 (abnormal) and her pulse is 98. Her oxygen saturation is 95%.     Chief Complaint: URI    Pt complaining of 3 weeks having a productive cough with thick green sputum, sneezing, post nasal drip , sinus pressure , fever (Tmax 102). No relief with breathing tx, rescue inhaler, mucinex, cold+flu medication.    URI    This is a new problem. The current episode started 1 to 4 weeks ago. The problem has been gradually worsening. Associated symptoms include congestion, coughing, sinus pain, sneezing and wheezing. Pertinent negatives include no ear pain, nausea, rash, sore throat or vomiting. Treatments tried: breathing tx, mucinex. The treatment provided mild relief.       Constitution: Negative for chills, sweating, fatigue and fever.   HENT: Positive for congestion, sinus pain and sinus pressure. Negative for ear pain, sore throat and voice change.    Neck: Negative for painful lymph nodes.   Eyes: Negative for eye redness.   Respiratory: Positive for cough, sputum production, wheezing and asthma. Negative for chest tightness, bloody sputum, COPD, shortness of breath and stridor.    Gastrointestinal: Negative for nausea and vomiting.   Musculoskeletal: Negative for muscle ache.   Skin: Negative for rash.   Allergic/Immunologic: Positive for asthma and sneezing. Negative for seasonal allergies.   Hematologic/Lymphatic: Negative for swollen lymph nodes.       Objective:      Physical Exam   Constitutional: She is oriented to person, place, and time. She appears well-developed and well-nourished. She is cooperative.  Non-toxic appearance. She does not have a sickly appearance. She does not appear ill. No distress.   HENT:   Head: Normocephalic and atraumatic.   Right Ear: Hearing, tympanic membrane, external ear and " ear canal normal.   Left Ear: Hearing, tympanic membrane, external ear and ear canal normal.   Nose: Mucosal edema and rhinorrhea present. No nasal deformity. No epistaxis. Right sinus exhibits maxillary sinus tenderness. Right sinus exhibits no frontal sinus tenderness. Left sinus exhibits maxillary sinus tenderness. Left sinus exhibits no frontal sinus tenderness.   Mouth/Throat: Uvula is midline and mucous membranes are normal. No trismus in the jaw. Normal dentition. No uvula swelling. Posterior oropharyngeal erythema and cobblestoning present. No oropharyngeal exudate or posterior oropharyngeal edema.   Eyes: Conjunctivae and lids are normal. No scleral icterus.   Neck: Trachea normal, full passive range of motion without pain and phonation normal. Neck supple. No neck rigidity. No edema and no erythema present.   Cardiovascular: Normal rate, regular rhythm, normal heart sounds, intact distal pulses and normal pulses.   Pulmonary/Chest: Effort normal. No respiratory distress. She has decreased breath sounds. She has no wheezes. She has no rhonchi.   Abdominal: Normal appearance.   Musculoskeletal: Normal range of motion. She exhibits no edema or deformity.   Neurological: She is alert and oriented to person, place, and time. She exhibits normal muscle tone. Coordination normal.   Skin: Skin is warm, dry, intact, not diaphoretic and not pale.   Psychiatric: She has a normal mood and affect. Her speech is normal and behavior is normal. Judgment and thought content normal. Cognition and memory are normal.   Nursing note and vitals reviewed.        Recent Results (from the past 48 hour(s))   POCT Influenza A/B    Collection Time: 01/31/20  5:38 PM   Result Value Ref Range    Rapid Influenza A Ag Negative Negative    Rapid Influenza B Ag Negative Negative     Acceptable Yes    ]  Xr Chest Pa And Lateral    Result Date: 1/31/2020  EXAMINATION: XR CHEST PA AND LATERAL CLINICAL HISTORY: Fever,  unspecified TECHNIQUE: PA and lateral views of the chest were performed. COMPARISON: 10/10/2019 FINDINGS: The lungs are clear, with normal appearance of pulmonary vasculature and no pleural effusion or pneumothorax. The cardiac silhouette is normal in size. The hilar and mediastinal contours are unremarkable. Bones are intact.     No acute abnormality. Electronically signed by: Lee Olivera Date:    01/31/2020 Time:    17:54    Assessment:       1. Acute bacterial bronchitis    2. Fever, unspecified fever cause    3. Cough        Plan:         Acute bacterial bronchitis  -     azithromycin (Z-TAMEKA) 250 MG tablet; Take 1 tablet (250 mg total) by mouth once daily. Take 2 pills the first day then 1 pill a day for 4 days for 6 doses  Dispense: 6 tablet; Refill: 0  -     dexamethasone injection 10 mg  -     promethazine-dextromethorphan (PROMETHAZINE-DM) 6.25-15 mg/5 mL Syrp; Take 5 mLs by mouth nightly as needed (cough).  Dispense: 150 mL; Refill: 0  -     albuterol (VENTOLIN HFA) 90 mcg/actuation inhaler; Inhale 2 puffs into the lungs every 6 (six) hours as needed for Wheezing. Rescue  Dispense: 18 g; Refill: 0    Fever, unspecified fever cause  -     POCT Influenza A/B  -     XR CHEST PA AND LATERAL; Future; Expected date: 01/31/2020    Cough  -     XR CHEST PA AND LATERAL; Future; Expected date: 01/31/2020      Patient Instructions   General Discharge Instructions     You received a steroid shot today - this can elevate your blood pressure, elevate your blood sugar, water weight gain, nervous energy, redness to the face and dimpling of the skin where the shot goes in.     If you were prescribed a narcotic or controlled medication, do not drive or operate heavy equipment or machinery while taking these medications.  If you were prescribed antibiotics, please take them to completion.  You must understand that you've received an Urgent Care treatment only and that you may be released before all your medical problems  are known or treated. You, the patient, will arrange for follow up care as instructed.  Follow up with your PCP or specialty clinic as directed in the next 1-2 weeks if not improved or as needed.  You can call [phone] to schedule an appointment with the appropriate provider.  If your condition worsens we recommend that you receive another evaluation at the emergency room immediately or contact your primary medical clinics after hours call service to discuss your concerns.  Please return here or go to the Emergency Department for any concerns or worsening of condition.      Bronchitis, Antibiotic Treatment (Adult)    Bronchitis is an infection of the air passages (bronchial tubes) in your lungs. It often occurs when you have a cold. This illness is contagious during the first few days and is spread through the air by coughing and sneezing, or by direct contact (touching the sick person and then touching your own eyes, nose, or mouth).  Symptoms of bronchitis include cough with mucus (phlegm) and low-grade fever. Bronchitis usually lasts 7 to 14 days. Mild cases can be treated with simple home remedies. More severe infection is treated with an antibiotic.  Home care  Follow these guidelines when caring for yourself at home:  · If your symptoms are severe, rest at home for the first 2 to 3 days. When you go back to your usual activities, don't let yourself get too tired.  · Do not smoke. Also avoid being exposed to secondhand smoke.  · You may use over-the-counter medicines to control fever or pain, unless another medicine was prescribed. (Note: If you have chronic liver or kidney disease or have ever had a stomach ulcer or gastrointestinal bleeding, talk with your healthcare provider before using these medicines. Also talk to your provider if you are taking medicine to prevent blood clots.) Aspirin should never be given to anyone younger than 18 years of age who is ill with a viral infection or fever. It may  cause severe liver or brain damage.  · Your appetite may be poor, so a light diet is fine. Avoid dehydration by drinking 6 to 8 glasses of fluids per day (such as water, soft drinks, sports drinks, juices, tea, or soup). Extra fluids will help loosen secretions in the nose and lungs.  · Over-the-counter cough, cold, and sore-throat medicines will not shorten the length of the illness, but they may be helpful to reduce symptoms. (Note: Do not use decongestants if you have high blood pressure.)  · Finish all antibiotic medicine. Do this even if you are feeling better after only a few days.  Follow-up care  Follow up with your healthcare provider, or as advised. If you had an X-ray or ECG (electrocardiogram), a specialist will review it. You will be notified of any new findings that may affect your care.  Note: If you are age 65 or older, or if you have a chronic lung disease or condition that affects your immune system, or you smoke, talk to your healthcare provider about having pneumococcal vaccinations and a yearly influenza vaccination (flu shot).  When to seek medical advice  Call your healthcare provider right away if any of these occur:  · Fever of 100.4°F (38°C) or higher  · Coughing up increased amounts of colored sputum  · Weakness, drowsiness, headache, facial pain, ear pain, or a stiff neck  Call 911, or get immediate medical care  Contact emergency services right away if any of these occur.  · Coughing up blood  · Worsening weakness, drowsiness, headache, or stiff neck  · Trouble breathing, wheezing, or pain with breathing  Date Last Reviewed: 9/13/2015 © 2000-2017 [employer]. [address] [place] [address], [place] [address]. All rights reserved. This information is not intended as a substitute for professional medical care. Always follow your healthcare professional's instructions.

## 2020-02-01 NOTE — PATIENT INSTRUCTIONS
General Discharge Instructions     You received a steroid shot today - this can elevate your blood pressure, elevate your blood sugar, water weight gain, nervous energy, redness to the face and dimpling of the skin where the shot goes in.     If you were prescribed a narcotic or controlled medication, do not drive or operate heavy equipment or machinery while taking these medications.  If you were prescribed antibiotics, please take them to completion.  You must understand that you've received an Urgent Care treatment only and that you may be released before all your medical problems are known or treated. You, the patient, will arrange for follow up care as instructed.  Follow up with your PCP or specialty clinic as directed in the next 1-2 weeks if not improved or as needed.  You can call [phone] to schedule an appointment with the appropriate provider.  If your condition worsens we recommend that you receive another evaluation at the emergency room immediately or contact your primary medical clinics after hours call service to discuss your concerns.  Please return here or go to the Emergency Department for any concerns or worsening of condition.      Bronchitis, Antibiotic Treatment (Adult)    Bronchitis is an infection of the air passages (bronchial tubes) in your lungs. It often occurs when you have a cold. This illness is contagious during the first few days and is spread through the air by coughing and sneezing, or by direct contact (touching the sick person and then touching your own eyes, nose, or mouth).  Symptoms of bronchitis include cough with mucus (phlegm) and low-grade fever. Bronchitis usually lasts 7 to 14 days. Mild cases can be treated with simple home remedies. More severe infection is treated with an antibiotic.  Home care  Follow these guidelines when caring for yourself at home:  · If your symptoms are severe, rest at home for the first 2 to 3 days. When you go back to your usual  activities, don't let yourself get too tired.  · Do not smoke. Also avoid being exposed to secondhand smoke.  · You may use over-the-counter medicines to control fever or pain, unless another medicine was prescribed. (Note: If you have chronic liver or kidney disease or have ever had a stomach ulcer or gastrointestinal bleeding, talk with your healthcare provider before using these medicines. Also talk to your provider if you are taking medicine to prevent blood clots.) Aspirin should never be given to anyone younger than 18 years of age who is ill with a viral infection or fever. It may cause severe liver or brain damage.  · Your appetite may be poor, so a light diet is fine. Avoid dehydration by drinking 6 to 8 glasses of fluids per day (such as water, soft drinks, sports drinks, juices, tea, or soup). Extra fluids will help loosen secretions in the nose and lungs.  · Over-the-counter cough, cold, and sore-throat medicines will not shorten the length of the illness, but they may be helpful to reduce symptoms. (Note: Do not use decongestants if you have high blood pressure.)  · Finish all antibiotic medicine. Do this even if you are feeling better after only a few days.  Follow-up care  Follow up with your healthcare provider, or as advised. If you had an X-ray or ECG (electrocardiogram), a specialist will review it. You will be notified of any new findings that may affect your care.  Note: If you are age 65 or older, or if you have a chronic lung disease or condition that affects your immune system, or you smoke, talk to your healthcare provider about having pneumococcal vaccinations and a yearly influenza vaccination (flu shot).  When to seek medical advice  Call your healthcare provider right away if any of these occur:  · Fever of 100.4°F (38°C) or higher  · Coughing up increased amounts of colored sputum  · Weakness, drowsiness, headache, facial pain, ear pain, or a stiff neck  Call 911, or get immediate  medical care  Contact emergency services right away if any of these occur.  · Coughing up blood  · Worsening weakness, drowsiness, headache, or stiff neck  · Trouble breathing, wheezing, or pain with breathing  Date Last Reviewed: 9/13/2015  © 8835-5521 [employer]. [address] [place] [id number]. All rights reserved. This information is not intended as a substitute for professional medical care. Always follow your healthcare professional's instructions.

## 2020-02-06 ENCOUNTER — OFFICE VISIT (OUTPATIENT)
Dept: PODIATRY | Facility: CLINIC | Age: 50
End: 2020-02-06
Payer: MEDICAID

## 2020-02-06 VITALS
WEIGHT: 235 LBS | DIASTOLIC BLOOD PRESSURE: 80 MMHG | SYSTOLIC BLOOD PRESSURE: 138 MMHG | HEIGHT: 66 IN | BODY MASS INDEX: 37.77 KG/M2

## 2020-02-06 DIAGNOSIS — M20.41 HAMMER TOES OF BOTH FEET: ICD-10-CM

## 2020-02-06 DIAGNOSIS — S93.402D INVERSION SPRAIN OF ANKLE, LEFT, SUBSEQUENT ENCOUNTER: ICD-10-CM

## 2020-02-06 DIAGNOSIS — M20.42 HAMMER TOES OF BOTH FEET: ICD-10-CM

## 2020-02-06 DIAGNOSIS — M20.12 HALLUX ABDUCTO VALGUS, LEFT: ICD-10-CM

## 2020-02-06 DIAGNOSIS — M20.5X2 HALLUX LIMITUS, ACQUIRED, LEFT: ICD-10-CM

## 2020-02-06 DIAGNOSIS — M20.5X1 HALLUX LIMITUS, ACQUIRED, RIGHT: ICD-10-CM

## 2020-02-06 DIAGNOSIS — E11.49 TYPE II DIABETES MELLITUS WITH NEUROLOGICAL MANIFESTATIONS: Primary | ICD-10-CM

## 2020-02-06 PROCEDURE — 99999 PR PBB SHADOW E&M-EST. PATIENT-LVL II: CPT | Mod: PBBFAC,,, | Performed by: PODIATRIST

## 2020-02-06 PROCEDURE — 99213 PR OFFICE/OUTPT VISIT, EST, LEVL III, 20-29 MIN: ICD-10-PCS | Mod: S$PBB,,, | Performed by: PODIATRIST

## 2020-02-06 PROCEDURE — 99213 OFFICE O/P EST LOW 20 MIN: CPT | Mod: S$PBB,,, | Performed by: PODIATRIST

## 2020-02-06 PROCEDURE — 99212 OFFICE O/P EST SF 10 MIN: CPT | Mod: PBBFAC,PO | Performed by: PODIATRIST

## 2020-02-06 PROCEDURE — 99999 PR PBB SHADOW E&M-EST. PATIENT-LVL II: ICD-10-PCS | Mod: PBBFAC,,, | Performed by: PODIATRIST

## 2020-02-06 NOTE — PROGRESS NOTES
rSubjective:      Patient ID: Audrey Natarajan is a 49 y.o. female.    Chief Complaint: Foot Pain (LEFT FOOT) and Foot Swelling (LEFT FOOT)      Audrey Natarajan is a 49 y.o. female who presents to the podiatry clinic  with complaint of left shin and central metatarsal pain.  Description: moderate Nature: aching, throbbing and bruising Onset of the symptoms was several months ago. Precipitating event: increased activity while on vacation.  History of injury: no Current symptoms include: worsening symptoms after a period of activity. Aggravating factors: standing and walking. Alleviating factors: rest. Symptoms have progressed to a point and plateaued. Patient has had prior foot problems. Evaluation to date: none. Treatment to date: none. Patients rates pain 6/10 on pain scale.      09/30/2019 patient presents to clinic for follow-up of persistent right foot and leg pain.  She relates that she discontinue use of Cam boot several days ago with instruction her primary care physician.  Patient states that she may have re-injured the foot secondary to a misstep a few days ago. She continues to have pain to the forefoot and lateral foot and ankle as well as the anterior leg.  Patient relates that she has done some icing.  She relates that she has not been taking any anti-inflammatories consistently.  Patient relates that she uses a lidocaine cream for pain which is somewhat useful.  Patient denies nausea, vomiting, fever, chills    12/05/2019 She has been wearing compression stockings with ankle brace and relates significant overall improvement.    02/06/2020  She has been wearing compression stockings with ankle brace and relates improvement but pain and swelling persists.      PCP: Donaldo Pena MD    Date Last Seen by PCP:   Chief Complaint   Patient presents with    Foot Pain     LEFT FOOT    Foot Swelling     LEFT FOOT       Hemoglobin A1C   Date Value Ref Range Status   10/21/2019 6.7 (H) 4.0 - 5.6 %  Final     Comment:     ADA Screening Guidelines:  5.7-6.4%  Consistent with prediabetes  >or=6.5%  Consistent with diabetes  High levels of fetal hemoglobin interfere with the HbA1C  assay. Heterozygous hemoglobin variants (HbS, HgC, etc)do  not significantly interfere with this assay.   However, presence of multiple variants may affect accuracy.     05/31/2019 5.9 (H) 4.0 - 5.6 % Final     Comment:     ADA Screening Guidelines:  5.7-6.4%  Consistent with prediabetes  >or=6.5%  Consistent with diabetes  High levels of fetal hemoglobin interfere with the HbA1C  assay. Heterozygous hemoglobin variants (HbS, HgC, etc)do  not significantly interfere with this assay.   However, presence of multiple variants may affect accuracy.     01/30/2019 6.6 (H) 4.0 - 5.6 % Final     Comment:     ADA Screening Guidelines:  5.7-6.4%  Consistent with prediabetes  >or=6.5%  Consistent with diabetes  High levels of fetal hemoglobin interfere with the HbA1C  assay. Heterozygous hemoglobin variants (HbS, HgC, etc)do  not significantly interfere with this assay.   However, presence of multiple variants may affect accuracy.             Patient Active Problem List   Diagnosis    Essential hypertension    COPD (chronic obstructive pulmonary disease)    Hypertriglyceridemia    Tobacco abuse    Mild protein malnutrition    Diabetic neuropathy    Leg swelling    Incisional hernia without mention of obstruction or gangrene    DM type 2 without retinopathy    History of DVT (deep vein thrombosis)    History of pulmonary embolus (PE)    Bipolar 1 disorder    Gastroparesis due to DM    Thrombocytosis    Leukocytosis    Morbid obesity    Type 2 diabetes mellitus    Acne    Chronic left-sided low back pain with sciatica    Chronic left-sided low back pain without sciatica    Weakness of left lower extremity    Decreased range of motion of lumbar spine    Other chronic pain    Vomiting and diarrhea    Chronic bilateral low back  pain with left-sided sciatica    Nuclear sclerosis of both eyes    Bilateral ocular hypertension    Refractive error    Chronic left-sided low back pain with left-sided sciatica    Decreased range of motion    Decreased strength, endurance, and mobility    Long term (current) use of anticoagulants    Hypercoagulable state    History of pulmonary embolism    DVT, recurrent, lower extremity, chronic, left       Current Outpatient Medications on File Prior to Visit   Medication Sig Dispense Refill    acetaminophen (ARTHRITIS PAIN RELIEVER) 650 MG TbSR Take 650 mg by mouth. Pt states taking 2 -3 times a day      albuterol (ACCUNEB) 0.63 mg/3 mL Nebu Take 3 mLs (0.63 mg total) by nebulization every 8 (eight) hours as needed (wheezing). Rescue 1 Box 1    albuterol (VENTOLIN HFA) 90 mcg/actuation inhaler Inhale 2 puffs into the lungs every 6 (six) hours as needed for Wheezing. Rescue 18 g 0    aspirin (ECOTRIN) 81 MG EC tablet Take 81 mg by mouth once daily.       azelastine (ASTELIN) 137 mcg (0.1 %) nasal spray 1 spray (137 mcg total) by Nasal route 2 (two) times daily. 30 mL 0    azelastine (ASTELIN) 137 mcg (0.1 %) nasal spray 1 spray (137 mcg total) by Nasal route 2 (two) times daily. 30 mL 0    azithromycin (Z-TAMEKA) 250 MG tablet Take 1 tablet (250 mg total) by mouth once daily. Take 2 pills the first day then 1 pill a day for 4 days for 6 doses 6 tablet 0    b complex vitamins tablet Take 1 tablet by mouth once daily. 90 tablet 2    carbamazepine (TEGRETOL) 200 mg tablet TK 2 TS PO BID  1    ciclopirox (LOPROX) 0.77 % Susp Apply topically once daily. 30 mL 3    ciclopirox-nail lacquer removr 8 % Kit Apply 1 application topically once a week. 1 kit 4    clotrimazole (LOTRIMIN) 1 % cream Apply topically 2 (two) times daily. 28 g 1    cyanocobalamin (VITAMIN B-12) 100 MCG tablet Take 1 tablet (100 mcg total) by mouth once daily. 90 tablet 1    diphenhydrAMINE (BENADRYL) 50 MG capsule Take 1  capsule (50 mg total) by mouth every 6 (six) hours as needed for Itching or Allergies (Swelling of the throat, rash and shortness of breath). 20 capsule 0    DULERA 100-5 mcg/actuation HFAA INHALE 2 PUFFS INTO THE LUNGS TWICE DAILY 13 g 1    DUPIXENT 300 mg/2 mL Syrg inject 300mg SUBCUTANEOUSLY every other week  6    famotidine (PEPCID) 20 MG tablet Take 1 tablet (20 mg total) by mouth 2 (two) times daily. 10 tablet 0    fish oil-omega-3 fatty acids 300-1,000 mg capsule Take 2 capsules by mouth once daily. Instructed to stop 5-7 days before surgery (8/11/16). 60 capsule 5    fluticasone propionate (FLONASE ALLERGY RELIEF) 50 mcg/actuation nasal spray 1 spray (50 mcg total) by Each Nare route 2 (two) times daily. 16 g 3    furosemide (LASIX) 20 MG tablet TAKE 1 TABLET(20 MG) BY MOUTH EVERY DAY 30 tablet 2    gabapentin (NEURONTIN) 600 MG tablet TAKE 1 TABLET(600 MG) BY MOUTH TWICE DAILY 60 tablet 2    hydrOXYzine pamoate (VISTARIL) 25 MG Cap       ibuprofen (ADVIL,MOTRIN) 600 MG tablet TAKE 1 TABLET(600 MG) BY MOUTH EVERY 8 HOURS AS NEEDED FOR PAIN 60 tablet 1    lancets Misc To check BG once daily, to use with insurance preferred meter 30 each 2    lidocaine 3 % Crea Rub on leg for pain as needed no more than 3 times a day 1 Tube 0    lisinopril (PRINIVIL,ZESTRIL) 5 MG tablet TAKE 1 TABLET(5 MG) BY MOUTH EVERY DAY 30 tablet 2    loratadine (CLARITIN) 10 mg tablet Take 1 tablet (10 mg total) by mouth once daily. 30 tablet 5    metFORMIN (GLUCOPHAGE) 1000 MG tablet Take 1 tablet (1,000 mg total) by mouth 2 (two) times daily with meals. 180 tablet 2    methocarbamol (ROBAXIN) 500 MG Tab TAKE 1 TABLET(500 MG) BY MOUTH EVERY 6 HOURS AS NEEDED 100 tablet 0    metoprolol tartrate (LOPRESSOR) 50 MG tablet Take 1 tablet (50 mg total) by mouth 2 (two) times daily. 60 tablet 2    mometasone-formoterol (DULERA) 200-5 mcg/actuation inhaler       multivitamin (THERAGRAN) per tablet Take 1 tablet by mouth every  "morning. 90 tablet 2    nicotine polacrilex 2 MG Lozg 1-2 per hour in place of cigarettes maximum of 20 per day. 168 lozenge 0    nystatin (MYCOSTATIN) powder Apply topically 2 (two) times daily. 60 g 2    ondansetron (ZOFRAN) 4 MG tablet Take 1 tablet (4 mg total) by mouth every 8 (eight) hours as needed for Nausea. 30 tablet 0    pantoprazole (PROTONIX) 40 MG tablet Take 1 tablet (40 mg total) by mouth once daily. 30 tablet 2    pravastatin (PRAVACHOL) 40 MG tablet TAKE 1 TABLET(40 MG) BY MOUTH EVERY DAY 30 tablet 2    promethazine-dextromethorphan (PROMETHAZINE-DM) 6.25-15 mg/5 mL Syrp Take 5 mLs by mouth nightly as needed (cough). 150 mL 0    risperidone (RISPERDAL) 3 MG Tab take at bedtime  1    VYVANSE 50 mg capsule TK ONE C PO QAM  0    blood-glucose meter kit To check BG once daily, to use with insurance preferred meter 1 each 0     No current facility-administered medications on file prior to visit.        Review of patient's allergies indicates:   Allergen Reactions    Morphine Other (See Comments)     Patient had a psychotic episode after taking Morphine  Agitation, hallucinations    Penicillins Anaphylaxis     itching       Past Surgical History:   Procedure Laterality Date    ABDOMINAL SURGERY      BILATERAL OOPHORECTOMY Bilateral 1/12/2015    Green' s filter Right 7/4/2012    Right Neck & Tunneled Down.    HERNIA REPAIR      "[place] [facility] around th [place].", pt. states    OOPHORECTOMY      OVARIAN CYST REMOVAL  3/13/2014    [place] REMOVAL OF OVARY/TUBE(S)      SPLENECTOMY, TOTAL  July 2003    TONSILLECTOMY      as a child    TYMPANOSTOMY TUBE PLACEMENT  1976    VEIN SURGERY  2003    Lt leg       Family History   Problem Relation Age of Onset    Hypertension Father     Diabetes Father     Heart disease Father     Cataracts Father     Diabetes Paternal Grandfather     Heart disease Paternal Grandfather     No Known Problems Mother     Ovarian cancer Maternal " Grandmother          from this. ? age     No Known Problems Sister     No Known Problems Brother     No Known Problems Maternal Aunt     No Known Problems Maternal Uncle     No Known Problems Paternal Aunt     No Known Problems Paternal Uncle     No Known Problems Maternal Grandfather     Ovarian cancer Paternal Grandmother     Uterine cancer Neg Hx     Breast cancer Neg Hx     Colon cancer Neg Hx     Amblyopia Neg Hx     Blindness Neg Hx     Cancer Neg Hx     Glaucoma Neg Hx     Macular degeneration Neg Hx     Retinal detachment Neg Hx     Strabismus Neg Hx     Stroke Neg Hx     Thyroid disease Neg Hx        Social History     Socioeconomic History    Marital status:      Spouse name: Not on file    Number of children: Not on file    Years of education: Not on file    Highest education level: Not on file   Occupational History    Not on file   Social Needs    Financial resource strain: Not on file    Food insecurity:     Worry: Not on file     Inability: Not on file    Transportation needs:     Medical: Not on file     Non-medical: Not on file   Tobacco Use    Smoking status: Current Every Day Smoker     Packs/day: 0.50     Years: 25.00     Pack years: 12.50     Types: Cigarettes    Smokeless tobacco: Never Used   Substance and Sexual Activity    Alcohol use: No     Alcohol/week: 0.0 standard drinks    Drug use: No    Sexual activity: Not Currently   Lifestyle    Physical activity:     Days per week: Not on file     Minutes per session: Not on file    Stress: Not on file   Relationships    Social connections:     Talks on phone: Not on file     Gets together: Not on file     Attends Cheondoism service: Not on file     Active member of club or organization: Not on file     Attends meetings of clubs or organizations: Not on file     Relationship status: Not on file   Other Topics Concern    Not on file   Social History Narrative    Not on file       Review of Systems  "  Constitution: Negative for chills and fever.   Cardiovascular: Positive for leg swelling. Negative for claudication.   Respiratory: Negative for cough and shortness of breath.    Skin: Positive for dry skin and nail changes. Negative for itching and rash.   Musculoskeletal: Positive for arthritis, back pain, joint pain and myalgias. Negative for joint swelling and muscle weakness.   Gastrointestinal: Negative for diarrhea, nausea and vomiting.   Neurological: Positive for numbness and paresthesias. Negative for tremors and weakness.   Psychiatric/Behavioral: Negative for altered mental status and hallucinations.           Objective:      Vitals:    02/06/20 1509   BP: 138/80   Weight: 106.6 kg (235 lb 0.2 oz)   Height: 5' 6" (1.676 m)   PainSc: 0-No pain       Physical Exam   Constitutional:  Non-toxic appearance. She does not have a sickly appearance. No distress.   Cardiovascular:   Pulses:       Dorsalis pedis pulses are 2+ on the right side, and 2+ on the left side.        Posterior tibial pulses are 2+ on the right side, and 2+ on the left side.   Pulmonary/Chest: No respiratory distress.   Musculoskeletal:        Right ankle: She exhibits decreased range of motion and swelling. No tenderness. No lateral malleolus, no medial malleolus, no AITFL, no CF ligament and no posterior TFL tenderness found. Achilles tendon exhibits no pain, no defect and normal Paniagua's test results.        Left ankle: She exhibits decreased range of motion and swelling. Tenderness (anterior shin pain). No lateral malleolus, no medial malleolus, no AITFL, no CF ligament, no posterior TFL and no proximal fibula tenderness found. Achilles tendon exhibits no pain, no defect and normal Paniagua's test results.        Right foot: There is no bony tenderness.        Left foot: There is tenderness (sub 2nd-4th MTPJ) and swelling.   Biomechanical exam: There is equinus deformity bilateral with decreased dorsiflexion at the ankle joint " bilateral. No tenderness with compression of heel. Negative tinels sign. Gait analysis reveals excessive pronation through midstance and propulsion with early heel off. Shoes reveals lateral heel counter wear bilateral     Decreased first MPJ range of motion both weightbearing and nonweightbearing, no crepitus observed the first MP joint, + dorsal flag sign. Mild  bunion deformity is observed .    Patient has hammertoes of digits 2-5 bilateral partially reducible without symptom today.     Neurological: She has normal reflexes. She displays no atrophy and no tremor. No sensory deficit. She exhibits normal muscle tone.   Paresthesias, and hyperesthesia bilateral feet at toes with no clearly identified trigger or source.    [place] 5.07 monofilament is intact bilateral feet. Sharp/dull sensation is also intact Bilateral feet.   Skin: Skin is warm, dry and intact. No bruising, no burn, no laceration, no lesion and no rash noted. She is not diaphoretic. No cyanosis. No pallor. Nails show no clubbing.   Examination of the skin reveals no evidence of significant rashes, open lesions, suspicious appearing nevi or other concerning lesions.      Psychiatric: Her mood appears not anxious. Her affect is not inappropriate. Her speech is not slurred. She is not combative. She is communicative. She is attentive.   Nursing note reviewed.            Assessment:       Encounter Diagnoses   Name Primary?    Type II diabetes mellitus with neurological manifestations Yes    Hallux limitus, acquired, right     Hallux limitus, acquired, left     Hammer toes of both feet     Hallux abducto valgus, left     Inversion sprain of ankle, left, subsequent encounter          Plan:       Audrey was seen today for foot pain and foot swelling.    Diagnoses and all orders for this visit:    Type II diabetes mellitus with neurological manifestations    Hallux limitus, acquired, right    Hallux limitus, acquired, left    Hammer toes of  both feet    Hallux abducto valgus, left    Inversion sprain of ankle, left, subsequent encounter      I counseled the patient on her conditions, their implications and medical management.       Greater than 50% of this visit spent on counseling and coordination of care.    Education about the diabetic foot, neuropathy, and prevention of limb loss.    Shoe inspection. Diabetic Foot Education. Patient reminded of the importance of good nutrition/healthy diet/weight management and blood sugar control to help prevent podiatric complications of diabetes. Patient instructed on proper foot hygeine. Wear comfortable, proper fitting shoes. Wash feet daily. Dry well. After drying, apply moisturizer to feet (no lotion to webspaces). Inspect feet daily for skin breaks, blisters, swelling, or redness. Wear cotton socks (preferably white)  Change socks every day. Do NOT walk barefoot. Do NOT use heating pads or hot water soaks. We discussed wearing proper shoe gear, daily foot inspections, never walking without protective shoe gear.     Discussed foot and ankle sprains and importance of immobilization for healing as well as the lengthy course of treatment for complete resolution.    She is to continue in Ankle brace for immobilization    Patient instructed to Cut down excessive ambulation and allow your legs to rest and the injury to heal.     Discussed wearing appropriate shoe gear and avoiding flats, slippers, sandals, and going barefoot. My recommendation for OTC supports is [facility].  Advised patient on wearing compression wraps, tights during activity. Advised patient to warm up before and after exercise or increased activity. Gradually return to exercise level; careful not to overtrain.    She will continue to monitor the areas daily, inspect his feet, wear protective shoe gear when ambulatory, moisturizer to maintain skin integrity and follow in this office in approximately 6 months, sooner p.r.n.

## 2020-02-07 DIAGNOSIS — E11.9 TYPE 2 DIABETES MELLITUS WITHOUT COMPLICATION: ICD-10-CM

## 2020-02-13 DIAGNOSIS — G89.29 CHRONIC MIDLINE LOW BACK PAIN WITHOUT SCIATICA: ICD-10-CM

## 2020-02-13 DIAGNOSIS — M54.50 CHRONIC MIDLINE LOW BACK PAIN WITHOUT SCIATICA: ICD-10-CM

## 2020-02-13 DIAGNOSIS — J30.89 NON-SEASONAL ALLERGIC RHINITIS, UNSPECIFIED TRIGGER: ICD-10-CM

## 2020-02-13 DIAGNOSIS — J01.10 ACUTE NON-RECURRENT FRONTAL SINUSITIS: ICD-10-CM

## 2020-02-13 RX ORDER — METHOCARBAMOL 500 MG/1
TABLET, FILM COATED ORAL
Qty: 100 TABLET | Refills: 0 | Status: SHIPPED | OUTPATIENT
Start: 2020-02-13 | End: 2020-03-25

## 2020-02-13 RX ORDER — LORATADINE 10 MG/1
TABLET ORAL
Qty: 30 TABLET | Refills: 5 | Status: SHIPPED | OUTPATIENT
Start: 2020-02-13 | End: 2020-05-19 | Stop reason: SDUPTHER

## 2020-02-24 ENCOUNTER — TELEPHONE (OUTPATIENT)
Dept: FAMILY MEDICINE | Facility: CLINIC | Age: 50
End: 2020-02-24

## 2020-02-24 DIAGNOSIS — Z12.39 SCREENING FOR BREAST CANCER: Primary | ICD-10-CM

## 2020-02-24 NOTE — TELEPHONE ENCOUNTER
----- Message from Tennille Hector MA sent at 2/21/2020  5:01 PM CST -----  Please Advise  ----- Message -----  From: Jolie Tre  Sent: 2/21/2020   4:52 PM CST  To: Becky [facility] Staff    Type:  Mammogram    Caller is requesting to schedule their annual mammogram appointment.  Order is not listed in EPIC.  Please enter order and contact patient to schedule.  Name of Caller: pt     Where would they like the mammogram performed? [facility]    Would the patient rather a call back or a response via [facility]? Call back     Best Call Back Number: [phone]    Additional Information:

## 2020-02-28 ENCOUNTER — LAB VISIT (OUTPATIENT)
Dept: LAB | Facility: HOSPITAL | Age: 50
End: 2020-02-28
Attending: INTERNAL MEDICINE
Payer: MEDICAID

## 2020-02-28 DIAGNOSIS — I10 ESSENTIAL HYPERTENSION: Chronic | ICD-10-CM

## 2020-02-28 DIAGNOSIS — E11.42 TYPE 2 DIABETES MELLITUS WITH DIABETIC POLYNEUROPATHY, WITHOUT LONG-TERM CURRENT USE OF INSULIN: ICD-10-CM

## 2020-02-28 DIAGNOSIS — Z79.01 LONG TERM (CURRENT) USE OF ANTICOAGULANTS: ICD-10-CM

## 2020-02-28 LAB
ALBUMIN SERPL BCP-MCNC: 3.7 G/DL (ref 3.5–5.2)
ALP SERPL-CCNC: 93 U/L (ref 55–135)
ALT SERPL W/O P-5'-P-CCNC: 17 U/L (ref 10–44)
ANION GAP SERPL CALC-SCNC: 11 MMOL/L (ref 8–16)
AST SERPL-CCNC: 14 U/L (ref 10–40)
BASOPHILS # BLD AUTO: 0.13 K/UL (ref 0–0.2)
BASOPHILS NFR BLD: 1.1 % (ref 0–1.9)
BILIRUB SERPL-MCNC: 0.3 MG/DL (ref 0.1–1)
BUN SERPL-MCNC: 8 MG/DL (ref 6–20)
CALCIUM SERPL-MCNC: 9.3 MG/DL (ref 8.7–10.5)
CHLORIDE SERPL-SCNC: 99 MMOL/L (ref 95–110)
CO2 SERPL-SCNC: 29 MMOL/L (ref 23–29)
CREAT SERPL-MCNC: 0.7 MG/DL (ref 0.5–1.4)
DIFFERENTIAL METHOD: ABNORMAL
EOSINOPHIL # BLD AUTO: 0.5 K/UL (ref 0–0.5)
EOSINOPHIL NFR BLD: 4.7 % (ref 0–8)
ERYTHROCYTE [DISTWIDTH] IN BLOOD BY AUTOMATED COUNT: 14.5 % (ref 11.5–14.5)
EST. GFR  (AFRICAN AMERICAN): >60 ML/MIN/1.73 M^2
EST. GFR  (NON AFRICAN AMERICAN): >60 ML/MIN/1.73 M^2
ESTIMATED AVG GLUCOSE: 174 MG/DL (ref 68–131)
GLUCOSE SERPL-MCNC: 151 MG/DL (ref 70–110)
HBA1C MFR BLD HPLC: 7.7 % (ref 4–5.6)
HCT VFR BLD AUTO: 43.5 % (ref 37–48.5)
HGB BLD-MCNC: 13.8 G/DL (ref 12–16)
IMM GRANULOCYTES # BLD AUTO: 0.05 K/UL (ref 0–0.04)
IMM GRANULOCYTES NFR BLD AUTO: 0.4 % (ref 0–0.5)
INR PPP: 0.9 (ref 0.8–1.2)
LYMPHOCYTES # BLD AUTO: 5.3 K/UL (ref 1–4.8)
LYMPHOCYTES NFR BLD: 46 % (ref 18–48)
MCH RBC QN AUTO: 28.4 PG (ref 27–31)
MCHC RBC AUTO-ENTMCNC: 31.7 G/DL (ref 32–36)
MCV RBC AUTO: 90 FL (ref 82–98)
MONOCYTES # BLD AUTO: 1 K/UL (ref 0.3–1)
MONOCYTES NFR BLD: 8.8 % (ref 4–15)
NEUTROPHILS # BLD AUTO: 4.5 K/UL (ref 1.8–7.7)
NEUTROPHILS NFR BLD: 39 % (ref 38–73)
NRBC BLD-RTO: 0 /100 WBC
PLATELET # BLD AUTO: 513 K/UL (ref 150–350)
PMV BLD AUTO: 10.3 FL (ref 9.2–12.9)
POTASSIUM SERPL-SCNC: 4.5 MMOL/L (ref 3.5–5.1)
PROT SERPL-MCNC: 6.7 G/DL (ref 6–8.4)
PROTHROMBIN TIME: 9.7 SEC (ref 9–12.5)
RBC # BLD AUTO: 4.86 M/UL (ref 4–5.4)
SODIUM SERPL-SCNC: 139 MMOL/L (ref 136–145)
WBC # BLD AUTO: 11.6 K/UL (ref 3.9–12.7)

## 2020-02-28 PROCEDURE — 85025 COMPLETE CBC W/AUTO DIFF WBC: CPT

## 2020-02-28 PROCEDURE — 85610 PROTHROMBIN TIME: CPT

## 2020-02-28 PROCEDURE — 83036 HEMOGLOBIN GLYCOSYLATED A1C: CPT

## 2020-02-28 PROCEDURE — 80053 COMPREHEN METABOLIC PANEL: CPT

## 2020-02-28 PROCEDURE — 36415 COLL VENOUS BLD VENIPUNCTURE: CPT | Mod: PN

## 2020-03-06 ENCOUNTER — OFFICE VISIT (OUTPATIENT)
Dept: FAMILY MEDICINE | Facility: CLINIC | Age: 50
End: 2020-03-06
Payer: MEDICAID

## 2020-03-06 VITALS
HEIGHT: 66 IN | DIASTOLIC BLOOD PRESSURE: 72 MMHG | RESPIRATION RATE: 17 BRPM | SYSTOLIC BLOOD PRESSURE: 132 MMHG | BODY MASS INDEX: 37.06 KG/M2 | HEART RATE: 93 BPM | WEIGHT: 230.63 LBS | OXYGEN SATURATION: 97 % | TEMPERATURE: 98 F

## 2020-03-06 DIAGNOSIS — J44.9 CHRONIC OBSTRUCTIVE PULMONARY DISEASE, UNSPECIFIED COPD TYPE: Chronic | ICD-10-CM

## 2020-03-06 DIAGNOSIS — E11.42 TYPE 2 DIABETES MELLITUS WITH DIABETIC POLYNEUROPATHY, WITHOUT LONG-TERM CURRENT USE OF INSULIN: Primary | ICD-10-CM

## 2020-03-06 DIAGNOSIS — M25.572 CHRONIC PAIN OF LEFT ANKLE: ICD-10-CM

## 2020-03-06 DIAGNOSIS — I10 ESSENTIAL HYPERTENSION: ICD-10-CM

## 2020-03-06 DIAGNOSIS — G89.29 CHRONIC PAIN OF LEFT ANKLE: ICD-10-CM

## 2020-03-06 PROCEDURE — 99999 PR PBB SHADOW E&M-EST. PATIENT-LVL IV: ICD-10-PCS | Mod: PBBFAC,,, | Performed by: INTERNAL MEDICINE

## 2020-03-06 PROCEDURE — 99999 PR PBB SHADOW E&M-EST. PATIENT-LVL IV: CPT | Mod: PBBFAC,,, | Performed by: INTERNAL MEDICINE

## 2020-03-06 PROCEDURE — 99214 OFFICE O/P EST MOD 30 MIN: CPT | Mod: PBBFAC,PN | Performed by: INTERNAL MEDICINE

## 2020-03-06 PROCEDURE — 99214 OFFICE O/P EST MOD 30 MIN: CPT | Mod: S$PBB,,, | Performed by: INTERNAL MEDICINE

## 2020-03-06 PROCEDURE — 99214 PR OFFICE/OUTPT VISIT, EST, LEVL IV, 30-39 MIN: ICD-10-PCS | Mod: S$PBB,,, | Performed by: INTERNAL MEDICINE

## 2020-03-06 RX ORDER — FUROSEMIDE 20 MG/1
TABLET ORAL
Qty: 30 TABLET | Refills: 2 | Status: SHIPPED | OUTPATIENT
Start: 2020-03-06 | End: 2020-06-04

## 2020-03-06 NOTE — PROGRESS NOTES
"Subjective:       Patient ID: Audrey Natarajan is a 49 y.o. female.    Chief Complaint: Establish Care and Follow-up    Left foot/ankle pain    HPI: 50 y/o w/ COPD tobacco dependence morbid obesity DM presents for scheduled follow up. Primary complaint today is chronic ongoing left lateral ankle and dorsal foot pain. Present > 6 months daily exacerbated by prolonged standing reports she is using compression stockings regularlly but not wearing today. Does feel distal toes are constantly numb no skin breakdown. Some relief with elevation. Taking gabapentin three times daily without significant relief. Weight is up    Review of Systems   Constitutional: Negative for activity change, appetite change, fatigue, fever and unexpected weight change.   HENT: Negative for ear pain, rhinorrhea and sore throat.    Eyes: Negative for discharge and visual disturbance.   Respiratory: Negative for chest tightness, shortness of breath and wheezing.    Cardiovascular: Negative for chest pain, palpitations and leg swelling.   Gastrointestinal: Negative for abdominal pain, constipation and diarrhea.   Endocrine: Negative for cold intolerance and heat intolerance.   Genitourinary: Negative for dysuria and hematuria.   Musculoskeletal: Positive for arthralgias, back pain and joint swelling. Negative for neck stiffness.   Skin: Negative for rash.   Neurological: Negative for dizziness, syncope, weakness and headaches.   Psychiatric/Behavioral: Negative for suicidal ideas.       Objective:     Vitals:    03/06/20 1056   BP: 132/72   BP Location: Right arm   Patient Position: Sitting   Pulse: 93   Resp: 17   Temp: 98.2 °F (36.8 °C)   TempSrc: Oral   SpO2: 97%   Weight: 104.6 kg (230 lb 9.6 oz)   Height: 5' 6" (1.676 m)          Physical Exam   Constitutional: She is oriented to person, place, and time. She appears well-developed and well-nourished.   HENT:   Head: Normocephalic and atraumatic.   Eyes: Conjunctivae are normal. No scleral " icterus.   Neck: Normal range of motion.   Cardiovascular: Normal rate and regular rhythm. Exam reveals no gallop and no friction rub.   No murmur heard.  Pulmonary/Chest: Effort normal and breath sounds normal. She has no wheezes. She has no rales.   Abdominal: Soft. Bowel sounds are normal. There is no tenderness. There is no rebound and no guarding.   Musculoskeletal: Normal range of motion. She exhibits no edema or tenderness.   Venous varicocities bilateral ankles w /o ulceration no pitting edema    Neurological: She is alert and oriented to person, place, and time. No cranial nerve deficit.   Skin: Skin is warm and dry.   Psychiatric: She has a normal mood and affect.       Assessment and Plan   1. Type 2 diabetes mellitus with diabetic polyneuropathy, without long-term current use of insulin  Trending up per medicaid prescribing guidelines sulayurea or dpp4 next line agents in light of obesity avoiding sulfayurea start sitaglipitin continue metformin repeat a1c in three months continue arb and statin   - SITagliptin (JANUVIA) 100 MG Tab; Take 1 tablet (100 mg total) by mouth once daily.  Dispense: 90 tablet; Refill: 0  - CBC auto differential; Future  - Comprehensive metabolic panel; Future  - Hemoglobin A1c; Future    2. Chronic pain of left ankle  Referral to PT for ROM exercises and HEP education  - Ambulatory referral/consult to Physical/Occupational Therapy; Future    3. Chronic obstructive pulmonary disease, unspecified COPD type  Stable on laba/ics needs to quit smoking she is contemplative at this time  - mometasone-formoterol (DULERA) 200-5 mcg/actuation inhaler; Inhale 1 puff into the lungs 2 (two) times daily.  Dispense: 8.8 g; Refill: 1

## 2020-03-12 ENCOUNTER — OFFICE VISIT (OUTPATIENT)
Dept: VASCULAR SURGERY | Facility: CLINIC | Age: 50
End: 2020-03-12
Payer: MEDICAID

## 2020-03-12 VITALS
DIASTOLIC BLOOD PRESSURE: 84 MMHG | HEIGHT: 66 IN | WEIGHT: 232.5 LBS | BODY MASS INDEX: 37.37 KG/M2 | SYSTOLIC BLOOD PRESSURE: 138 MMHG

## 2020-03-12 DIAGNOSIS — I82.5Z2 LOWER LEG DVT (DEEP VENOUS THROMBOEMBOLISM), CHRONIC, LEFT: Primary | ICD-10-CM

## 2020-03-12 DIAGNOSIS — I87.303 VENOUS HYPERTENSION OF BOTH LOWER EXTREMITIES: ICD-10-CM

## 2020-03-12 PROCEDURE — 99999 PR PBB SHADOW E&M-EST. PATIENT-LVL V: CPT | Mod: PBBFAC,,, | Performed by: SURGERY

## 2020-03-12 PROCEDURE — 99215 OFFICE O/P EST HI 40 MIN: CPT | Mod: PBBFAC | Performed by: SURGERY

## 2020-03-12 PROCEDURE — 99214 PR OFFICE/OUTPT VISIT, EST, LEVL IV, 30-39 MIN: ICD-10-PCS | Mod: S$PBB,,, | Performed by: SURGERY

## 2020-03-12 PROCEDURE — 99999 PR PBB SHADOW E&M-EST. PATIENT-LVL V: ICD-10-PCS | Mod: PBBFAC,,, | Performed by: SURGERY

## 2020-03-12 PROCEDURE — 99214 OFFICE O/P EST MOD 30 MIN: CPT | Mod: S$PBB,,, | Performed by: SURGERY

## 2020-03-12 NOTE — PROGRESS NOTES
"       Tone Mata MD VI                       [facility]                         03/12/2020    HPI:  Audrey Natarajan is a 49 y.o. female with   Patient Active Problem List   Diagnosis    Essential hypertension    COPD (chronic obstructive pulmonary disease)    Hypertriglyceridemia    Tobacco abuse    Mild protein malnutrition    Diabetic neuropathy    Leg swelling    Incisional hernia without mention of obstruction or gangrene    DM type 2 without retinopathy    History of DVT (deep vein thrombosis)    History of pulmonary embolus (PE)    Bipolar 1 disorder    Gastroparesis due to DM    Thrombocytosis    Leukocytosis    Morbid obesity    Type 2 diabetes mellitus    Acne    Chronic left-sided low back pain with sciatica    Chronic left-sided low back pain without sciatica    Weakness of left lower extremity    Decreased range of motion of lumbar spine    Other chronic pain    Vomiting and diarrhea    Chronic bilateral low back pain with left-sided sciatica    Nuclear sclerosis of both eyes    Bilateral ocular hypertension    Refractive error    Chronic left-sided low back pain with left-sided sciatica    Decreased range of motion    Decreased strength, endurance, and mobility    Long term (current) use of anticoagulants    Hypercoagulable state    History of pulmonary embolism    DVT, recurrent, lower extremity, chronic, left    being managed by PCP and specialists who is here today for evaluation of BLE pain.  9/1/19 presented to ER due to RLE cramping and LLE edema.  S/p LLE DVT 2003, unprovoked and treated with anticoagulation.  S/p PE and L femoral and PT DVT 2013 and had a Bard retrievable filter placed 2013; has had persistent pain and edema since that time.  Repeat US 9/1/19 in ER due to pain/edema showed chronic L PT.  Pt states 2013 after she injured her LLE and had a bleeding vein she had a "vein stripping" to the outside of her LLE.  Patient " "states location is BLE occurring for 6 yrs.  Associated signs and symptoms include discoloration.  Quality is sharp/throbbing and severity is 10/10 at worst, average 7/10.  Symptoms began 6 yrs ago.  Alleviating factors include elevation.  Worsening factors include dependency.  Denies claudication.      no MI  no Stroke  Tobacco use: 3 cig/day; prev 1 ppd x 35 yrs    12/2019: c/o severe LLE pain.  +compression daily.  C/o stockings being too tight.  States bilateral foot paresthesias.      Interval history:  Cont to c/o LLE pain.  Cannot tolerate compression.      Past Medical History:   Diagnosis Date    ADHD (attention deficit hyperactivity disorder)     Arthritis     Asthma     Bipolar 1 disorder     Cataract     COPD (chronic obstructive pulmonary disease)     Coronary artery disease     A fib    Depression     bipolar manic depresson    Diabetes mellitus     DVT of lower extremity, bilateral July 2013    bilateral LE DVT. Estelita filter placed.     Encounter for blood transfusion     History of blood clots 1. Left Leg=2003; 2.Bilateral Groin=Blood Clots= 5 or 6/ 2013 & 7/2013; 3. LLL of Lung=7/2013;  4. Lt. Lower Leg=7/2013.     Pt. had 1st Blood Clot after Isicfkgignmx=9265, & Last=2013. Estelita Filter= Rt.Lateral Neck.    HTN (hypertension) 6/6/2013    Pt states that she does not have hypertension    Hypercholesteremia     Irregular heartbeat     Neuromuscular disorder     neuropathy feet    PE (pulmonary embolism) July 2013     bilat LE DVT.     Restless leg syndrome      Past Surgical History:   Procedure Laterality Date    ABDOMINAL SURGERY      BILATERAL OOPHORECTOMY Bilateral 1/12/2015    Green' s filter Right 7/4/2012    Right Neck & Tunneled Down.    HERNIA REPAIR      "[place] of Hernias Repaires around th [place].", pt. states    OOPHORECTOMY      OVARIAN CYST REMOVAL  3/13/2014    TN REMOVAL OF OVARY/TUBE(S)      SPLENECTOMY, TOTAL  July 2003    TONSILLECTOMY      " as a child    TYMPANOSTOMY TUBE PLACEMENT  1976    VEIN SURGERY      Lt leg     Family History   Problem Relation Age of Onset    Hypertension Father     Diabetes Father     Heart disease Father     Cataracts Father     Diabetes Paternal Grandfather     Heart disease Paternal Grandfather     No Known Problems Mother     Ovarian cancer Maternal Grandmother          from this. ? age     No Known Problems Sister     No Known Problems Brother     No Known Problems Maternal Aunt     No Known Problems Maternal Uncle     No Known Problems Paternal Aunt     No Known Problems Paternal Uncle     No Known Problems Maternal Grandfather     Ovarian cancer Paternal Grandmother     Uterine cancer Neg Hx     Breast cancer Neg Hx     Colon cancer Neg Hx     Amblyopia Neg Hx     Blindness Neg Hx     Cancer Neg Hx     Glaucoma Neg Hx     Macular degeneration Neg Hx     Retinal detachment Neg Hx     Strabismus Neg Hx     Stroke Neg Hx     Thyroid disease Neg Hx      Social History     Socioeconomic History    Marital status:      Spouse name: Not on file    Number of children: Not on file    Years of education: Not on file    Highest education level: Not on file   Occupational History    Not on file   Social Needs    Financial resource strain: Not on file    Food insecurity:     Worry: Not on file     Inability: Not on file    Transportation needs:     Medical: Not on file     Non-medical: Not on file   Tobacco Use    Smoking status: Current Every Day Smoker     Packs/day: 0.50     Years: 25.00     Pack years: 12.50     Types: Cigarettes    Smokeless tobacco: Never Used   Substance and Sexual Activity    Alcohol use: No     Alcohol/week: 0.0 standard drinks    Drug use: No    Sexual activity: Not Currently   Lifestyle    Physical activity:     Days per week: Not on file     Minutes per session: Not on file    Stress: Not on file   Relationships    Social connections:      Talks on phone: Not on file     Gets together: Not on file     Attends Mandaeism service: Not on file     Active member of club or organization: Not on file     Attends meetings of clubs or organizations: Not on file     Relationship status: Not on file   Other Topics Concern    Not on file   Social History Narrative    Not on file       Current Outpatient Medications:     acetaminophen (ARTHRITIS PAIN RELIEVER) 650 MG TbSR, Take 650 mg by mouth. Pt states taking 2 -3 times a day, Disp: , Rfl:     albuterol (ACCUNEB) 0.63 mg/3 mL Nebu, Take 3 mLs (0.63 mg total) by nebulization every 8 (eight) hours as needed (wheezing). Rescue, Disp: 1 Box, Rfl: 1    albuterol (VENTOLIN HFA) 90 mcg/actuation inhaler, Inhale 2 puffs into the lungs every 6 (six) hours as needed for Wheezing. Rescue, Disp: 18 g, Rfl: 0    aspirin (ECOTRIN) 81 MG EC tablet, Take 81 mg by mouth once daily. , Disp: , Rfl:     azelastine (ASTELIN) 137 mcg (0.1 %) nasal spray, 1 spray (137 mcg total) by Nasal route 2 (two) times daily., Disp: 30 mL, Rfl: 0    b complex vitamins tablet, Take 1 tablet by mouth once daily., Disp: 90 tablet, Rfl: 2    carbamazepine (TEGRETOL) 200 mg tablet, TK 2 TS PO BID, Disp: , Rfl: 1    ciclopirox (LOPROX) 0.77 % Susp, Apply topically once daily., Disp: 30 mL, Rfl: 3    ciclopirox-nail lacquer removr 8 % Kit, Apply 1 application topically once a week., Disp: 1 kit, Rfl: 4    clotrimazole (LOTRIMIN) 1 % cream, Apply topically 2 (two) times daily., Disp: 28 g, Rfl: 1    cyanocobalamin (VITAMIN B-12) 100 MCG tablet, Take 1 tablet (100 mcg total) by mouth once daily., Disp: 90 tablet, Rfl: 1    diphenhydrAMINE (BENADRYL) 50 MG capsule, Take 1 capsule (50 mg total) by mouth every 6 (six) hours as needed for Itching or Allergies (Swelling of the throat, rash and shortness of breath)., Disp: 20 capsule, Rfl: 0    DUPIXENT 300 mg/2 mL Syrg, inject 300mg SUBCUTANEOUSLY every other week, Disp: , Rfl: 6    famotidine  (PEPCID) 20 MG tablet, Take 1 tablet (20 mg total) by mouth 2 (two) times daily., Disp: 10 tablet, Rfl: 0    fish oil-omega-3 fatty acids 300-1,000 mg capsule, Take 2 capsules by mouth once daily. Instructed to stop 5-7 days before surgery (8/11/16)., Disp: 60 capsule, Rfl: 5    fluticasone propionate (FLONASE ALLERGY RELIEF) 50 mcg/actuation nasal spray, 1 spray (50 mcg total) by Each Nare route 2 (two) times daily., Disp: 16 g, Rfl: 3    furosemide (LASIX) 20 MG tablet, TAKE 1 TABLET(20 MG) BY MOUTH EVERY DAY, Disp: 30 tablet, Rfl: 2    gabapentin (NEURONTIN) 600 MG tablet, TAKE 1 TABLET(600 MG) BY MOUTH TWICE DAILY, Disp: 60 tablet, Rfl: 2    hydrOXYzine pamoate (VISTARIL) 25 MG Cap, , Disp: , Rfl:     ibuprofen (ADVIL,MOTRIN) 600 MG tablet, TAKE 1 TABLET(600 MG) BY MOUTH EVERY 8 HOURS AS NEEDED FOR PAIN, Disp: 60 tablet, Rfl: 1    lancets Misc, To check BG once daily, to use with insurance preferred meter, Disp: 30 each, Rfl: 2    lidocaine 3 % Crea, Rub on leg for pain as needed no more than 3 times a day, Disp: 1 Tube, Rfl: 0    lisinopril (PRINIVIL,ZESTRIL) 5 MG tablet, TAKE 1 TABLET(5 MG) BY MOUTH EVERY DAY, Disp: 30 tablet, Rfl: 2    loratadine (CLARITIN) 10 mg tablet, TAKE 1 TABLET BY MOUTH ONCE DAILY, Disp: 30 tablet, Rfl: 5    metFORMIN (GLUCOPHAGE) 1000 MG tablet, Take 1 tablet (1,000 mg total) by mouth 2 (two) times daily with meals., Disp: 180 tablet, Rfl: 2    methocarbamol (ROBAXIN) 500 MG Tab, TAKE 1 TABLET(500 MG) BY MOUTH EVERY 6 HOURS AS NEEDED, Disp: 100 tablet, Rfl: 0    metoprolol tartrate (LOPRESSOR) 50 MG tablet, Take 1 tablet (50 mg total) by mouth 2 (two) times daily., Disp: 60 tablet, Rfl: 2    mometasone-formoterol (DULERA) 200-5 mcg/actuation inhaler, Inhale 1 puff into the lungs 2 (two) times daily., Disp: 8.8 g, Rfl: 1    multivitamin (THERAGRAN) per tablet, Take 1 tablet by mouth every morning., Disp: 90 tablet, Rfl: 2    nicotine polacrilex 2 MG Lozg, 1-2 per hour  in place of cigarettes maximum of 20 per day., Disp: 168 lozenge, Rfl: 0    nystatin (MYCOSTATIN) powder, Apply topically 2 (two) times daily., Disp: 60 g, Rfl: 2    ondansetron (ZOFRAN) 4 MG tablet, Take 1 tablet (4 mg total) by mouth every 8 (eight) hours as needed for Nausea., Disp: 30 tablet, Rfl: 0    pantoprazole (PROTONIX) 40 MG tablet, Take 1 tablet (40 mg total) by mouth once daily., Disp: 30 tablet, Rfl: 2    pravastatin (PRAVACHOL) 40 MG tablet, TAKE 1 TABLET(40 MG) BY MOUTH EVERY DAY, Disp: 30 tablet, Rfl: 2    risperidone (RISPERDAL) 3 MG Tab, take at bedtime, Disp: , Rfl: 1    SITagliptin (JANUVIA) 100 MG Tab, Take 1 tablet (100 mg total) by mouth once daily., Disp: 90 tablet, Rfl: 0    VYVANSE 50 mg capsule, TK ONE C PO QAM, Disp: , Rfl: 0    blood-glucose meter kit, To check BG once daily, to use with insurance preferred meter, Disp: 1 each, Rfl: 0    REVIEW OF SYSTEMS:  General: No fevers or chills; ENT: No sore throat; Allergy and Immunology: no persistent infections; Hematological and Lymphatic: No history of bleeding or easy bruising; Endocrine: negative; Respiratory: no cough, shortness of breath, or wheezing; Cardiovascular: no chest pain or dyspnea on exertion; Gastrointestinal: no abdominal pain/back, change in bowel habits, or bloody stools; Genito-Urinary: no dysuria, trouble voiding, or hematuria; Musculoskeletal: negative; Neurological: no TIA or stroke symptoms; Psychiatric: no nervousness, anxiety or depression.    PHYSICAL EXAM:                General appearance:  Alert, well-appearing, and in no distress.  Oriented to person, place, and time                    Neurological: Normal speech, no focal findings noted; CN II - XII grossly intact. RLE with sensation to light touch, LLE with sensation to light touch.            Musculoskeletal: Digits/nail without cyanosis/clubbing.  Strength 5/5 BLE.                    Neck: Supple, no significant adenopathy, no carotid bruit can be  auscultated                  Chest:  Clear to auscultation, no wheezes, rales or rhonchi, symmetric air entry. No use of accessory muscles               Cardiac: Normal rate and regular rhythm, S1 and S2 normal            Abdomen: Soft, nontender, nondistended, no masses or organomegaly, no hernia     No rebound tenderness noted; bowel sounds normal     Pulsatile aortic mass is non palpable.     No groin adenopathy      Extremities:        2+ R DP pulse, 2+ L DP pulse     1+ RLE edema, 1+ LLE edema    Skin: RLE without wounds; LLE without wounds    LAB RESULTS:  No results found for: CBC  Lab Results   Component Value Date    LABPROT 9.7 02/28/2020    INR 0.9 02/28/2020     Lab Results   Component Value Date     02/28/2020    K 4.5 02/28/2020    CL 99 02/28/2020    CO2 29 02/28/2020     (H) 02/28/2020    BUN 8 02/28/2020    CREATININE 0.7 02/28/2020    CALCIUM 9.3 02/28/2020    ANIONGAP 11 02/28/2020    EGFRNONAA >60 02/28/2020     Lab Results   Component Value Date    WBC 11.60 02/28/2020    RBC 4.86 02/28/2020    HGB 13.8 02/28/2020    HCT 43.5 02/28/2020    MCV 90 02/28/2020    MCH 28.4 02/28/2020    MCHC 31.7 (L) 02/28/2020    RDW 14.5 02/28/2020     (H) 02/28/2020    MPV 10.3 02/28/2020    GRAN 4.5 02/28/2020    GRAN 39.0 02/28/2020    LYMPH 5.3 (H) 02/28/2020    LYMPH 46.0 02/28/2020    MONO 1.0 02/28/2020    MONO 8.8 02/28/2020    EOS 0.5 02/28/2020    BASO 0.13 02/28/2020    EOSINOPHIL 4.7 02/28/2020    BASOPHIL 1.1 02/28/2020    DIFFMETHOD Automated 02/28/2020     .  Lab Results   Component Value Date    HGBA1C 7.7 (H) 02/28/2020       IMAGING:  All pertinent imaging has been reviewed and interpreted independently.    Venous US 9/2019: Left 1 of 2 PT vein with thrombus present, similar to previous US     9/16/19 JEYSON:  1. Right lower extremity pressures and waveforms indicate no hemodynamically significant arterial occlusive disease.  2. Left lower extremity pressures and waveforms  indicate no hemodynamically significant arterial occlusive disease.     Venous reflux 12/12/19: RLE with GSV reflux at distal thigh to calf.  No DVT.    DVT LLE US 12/16/19: No LLE DVT    IMP/PLAN:  49 y.o. female with   Patient Active Problem List   Diagnosis    Essential hypertension    COPD (chronic obstructive pulmonary disease)    Hypertriglyceridemia    Tobacco abuse    Mild protein malnutrition    Diabetic neuropathy    Leg swelling    Incisional hernia without mention of obstruction or gangrene    DM type 2 without retinopathy    History of DVT (deep vein thrombosis)    History of pulmonary embolus (PE)    Bipolar 1 disorder    Gastroparesis due to DM    Thrombocytosis    Leukocytosis    Morbid obesity    Type 2 diabetes mellitus    Acne    Chronic left-sided low back pain with sciatica    Chronic left-sided low back pain without sciatica    Weakness of left lower extremity    Decreased range of motion of lumbar spine    Other chronic pain    Vomiting and diarrhea    Chronic bilateral low back pain with left-sided sciatica    Nuclear sclerosis of both eyes    Bilateral ocular hypertension    Refractive error    Chronic left-sided low back pain with left-sided sciatica    Decreased range of motion    Decreased strength, endurance, and mobility    Long term (current) use of anticoagulants    Hypercoagulable state    History of pulmonary embolism    DVT, recurrent, lower extremity, chronic, left    being managed by PCP and specialists who is here today for evaluation of BLE pain and edema.    -LLE pain and edema likely due to post thrombotic syndrome, no DVT on repeat venous duplex US - rec LLE venous reflux US  -recommend compression with Rx stockings, elevation, dietary changes associated with water and sodium intake discussed at length with patient - wrap Rx today  -R GSV distal reflux asymptomatic- recommend compression with Rx stockings, elevation, dietary changes  associated with water and sodium intake discussed at length with patient  -Cont Podiatry mgmt of foot/toe pain  -Rec exercise  -Instructed to optimize neuropathy with Dr. Pena - if no improvement rec Neuro and pain mgmt referrals  -Cont Hematology for comprehensive mgmt of thrombotic events  -RTC 1 mo for further evaluation    I spent 20 minutes evaluating this patient and greater than 50% of the time was spent counseling, coordinator care and discussing the plan of care.  All questions were answered and patient stated understanding with agreement with the above treatment plan.    Tone Mata MD VI  Vascular and Endovascular Surgery

## 2020-03-12 NOTE — LETTER
March 12, 2020        Donaldo Pena MD  [address] [facility] [address]             [facility]  [address]  [facility] [address]  Phone: [phone]  Fax: [phone]   Patient: Audrey Natarajan   MR Number: [id number]   YOB: 1970   Date of Visit: 3/12/2020       Dear Dr. Pena:    Thank you for referring Audrey Natarajan to me for evaluation. Below are the relevant portions of my assessment and plan of care.            If you have questions, please do not hesitate to call me. I look forward to following Audrey along with you.    Sincerely,      Tone Mata MD           CC  No Recipients

## 2020-03-16 RX ORDER — PANTOPRAZOLE SODIUM 40 MG/1
TABLET, DELAYED RELEASE ORAL
Qty: 30 TABLET | Refills: 2 | Status: SHIPPED | OUTPATIENT
Start: 2020-03-16 | End: 2020-05-19 | Stop reason: SDUPTHER

## 2020-03-18 ENCOUNTER — HOSPITAL ENCOUNTER (OUTPATIENT)
Dept: CARDIOLOGY | Facility: HOSPITAL | Age: 50
Discharge: HOME | End: 2020-03-18
Attending: SURGERY
Payer: MEDICAID

## 2020-03-18 ENCOUNTER — TELEPHONE (OUTPATIENT)
Dept: FAMILY MEDICINE | Facility: CLINIC | Age: 50
End: 2020-03-18

## 2020-03-18 DIAGNOSIS — I82.5Z2 LOWER LEG DVT (DEEP VENOUS THROMBOEMBOLISM), CHRONIC, LEFT: ICD-10-CM

## 2020-03-18 DIAGNOSIS — I87.303 VENOUS HYPERTENSION OF BOTH LOWER EXTREMITIES: ICD-10-CM

## 2020-03-18 PROCEDURE — 93971 EXTREMITY STUDY: CPT | Mod: 26,LT,, | Performed by: SURGERY

## 2020-03-18 PROCEDURE — 93971 CV US LOWER VENOUS INSUFFICIENCY LEFT (CUPID ONLY): ICD-10-PCS | Mod: 26,LT,, | Performed by: SURGERY

## 2020-03-18 PROCEDURE — 93971 EXTREMITY STUDY: CPT | Mod: TC,LT

## 2020-03-18 NOTE — TELEPHONE ENCOUNTER
----- Message from Evangelina Acosta sent at 3/18/2020 10:56 AM CDT -----  Contact: Audrey   Pt is requesting return call with advice from the provider. Pt does not have Symptoms of COVID-19 however is worried she is at higher risk. Pt has not been exposed that she knows of. Please call pt at [phone]. Pt wants to know if she needs to be doing something or having test done.

## 2020-03-18 NOTE — TELEPHONE ENCOUNTER
I spoke to the pt and advised her there is nothing to do other than social distancing and good hand hygiene. Pt advised if she develops cough, fever, SOB, Flu like sx to call the office.

## 2020-03-24 PROBLEM — M54.42 CHRONIC BILATERAL LOW BACK PAIN WITH LEFT-SIDED SCIATICA: Status: RESOLVED | Noted: 2018-06-22 | Resolved: 2020-03-24

## 2020-03-24 PROBLEM — M53.86 DECREASED RANGE OF MOTION OF LUMBAR SPINE: Status: RESOLVED | Noted: 2018-01-12 | Resolved: 2020-03-24

## 2020-03-24 PROBLEM — R68.89 DECREASED STRENGTH, ENDURANCE, AND MOBILITY: Status: RESOLVED | Noted: 2019-07-01 | Resolved: 2020-03-24

## 2020-03-24 PROBLEM — R53.1 DECREASED STRENGTH, ENDURANCE, AND MOBILITY: Status: RESOLVED | Noted: 2019-07-01 | Resolved: 2020-03-24

## 2020-03-24 PROBLEM — G89.29 CHRONIC LEFT-SIDED LOW BACK PAIN WITHOUT SCIATICA: Status: RESOLVED | Noted: 2018-01-12 | Resolved: 2020-03-24

## 2020-03-24 PROBLEM — M25.60 DECREASED RANGE OF MOTION: Status: RESOLVED | Noted: 2019-07-01 | Resolved: 2020-03-24

## 2020-03-24 PROBLEM — Z74.09 DECREASED STRENGTH, ENDURANCE, AND MOBILITY: Status: RESOLVED | Noted: 2019-07-01 | Resolved: 2020-03-24

## 2020-03-24 PROBLEM — M54.40 CHRONIC LEFT-SIDED LOW BACK PAIN WITH SCIATICA: Status: RESOLVED | Noted: 2018-01-12 | Resolved: 2020-03-24

## 2020-03-24 PROBLEM — G89.29 CHRONIC BILATERAL LOW BACK PAIN WITH LEFT-SIDED SCIATICA: Status: RESOLVED | Noted: 2018-06-22 | Resolved: 2020-03-24

## 2020-03-24 PROBLEM — R29.898 WEAKNESS OF LEFT LOWER EXTREMITY: Status: RESOLVED | Noted: 2018-01-12 | Resolved: 2020-03-24

## 2020-03-24 PROBLEM — M54.42 CHRONIC LEFT-SIDED LOW BACK PAIN WITH LEFT-SIDED SCIATICA: Status: RESOLVED | Noted: 2019-07-01 | Resolved: 2020-03-24

## 2020-03-24 PROBLEM — G89.29 CHRONIC LEFT-SIDED LOW BACK PAIN WITH LEFT-SIDED SCIATICA: Status: RESOLVED | Noted: 2019-07-01 | Resolved: 2020-03-24

## 2020-03-24 PROBLEM — M54.50 CHRONIC LEFT-SIDED LOW BACK PAIN WITHOUT SCIATICA: Status: RESOLVED | Noted: 2018-01-12 | Resolved: 2020-03-24

## 2020-03-24 PROBLEM — G89.29 CHRONIC LEFT-SIDED LOW BACK PAIN WITH SCIATICA: Status: RESOLVED | Noted: 2018-01-12 | Resolved: 2020-03-24

## 2020-03-25 ENCOUNTER — TELEPHONE (OUTPATIENT)
Dept: VASCULAR SURGERY | Facility: CLINIC | Age: 50
End: 2020-03-25

## 2020-03-25 DIAGNOSIS — M54.50 CHRONIC MIDLINE LOW BACK PAIN WITHOUT SCIATICA: ICD-10-CM

## 2020-03-25 DIAGNOSIS — G89.29 CHRONIC MIDLINE LOW BACK PAIN WITHOUT SCIATICA: ICD-10-CM

## 2020-03-25 DIAGNOSIS — J30.89 NON-SEASONAL ALLERGIC RHINITIS, UNSPECIFIED TRIGGER: ICD-10-CM

## 2020-03-25 RX ORDER — METHOCARBAMOL 500 MG/1
TABLET, FILM COATED ORAL
Qty: 100 TABLET | Refills: 0 | Status: SHIPPED | OUTPATIENT
Start: 2020-03-25 | End: 2020-05-04

## 2020-03-25 RX ORDER — FLUTICASONE PROPIONATE 50 MCG
SPRAY, SUSPENSION (ML) NASAL
Qty: 16 G | Refills: 3 | Status: SHIPPED | OUTPATIENT
Start: 2020-03-25 | End: 2020-05-19 | Stop reason: SDUPTHER

## 2020-03-25 RX ORDER — FLUTICASONE PROPIONATE 50 UG/1
SPRAY, METERED NASAL
Qty: 15.8 ML | Status: CANCELLED | OUTPATIENT
Start: 2020-03-25

## 2020-03-25 NOTE — TELEPHONE ENCOUNTER
Call to patient to let her know we are rescheduling patients due to COVID 19. Explained could make her a virtual visit or reschedule her. She asked to just be rescheduled. Will mail out appointment slips.

## 2020-04-05 DIAGNOSIS — I10 ESSENTIAL HYPERTENSION: Chronic | ICD-10-CM

## 2020-04-05 DIAGNOSIS — M54.50 CHRONIC BILATERAL LOW BACK PAIN WITHOUT SCIATICA: ICD-10-CM

## 2020-04-05 DIAGNOSIS — G89.29 CHRONIC BILATERAL LOW BACK PAIN WITHOUT SCIATICA: ICD-10-CM

## 2020-04-05 DIAGNOSIS — M54.32 LEFT SIDED SCIATICA: ICD-10-CM

## 2020-04-05 DIAGNOSIS — E11.42 TYPE 2 DIABETES MELLITUS WITH DIABETIC POLYNEUROPATHY, WITHOUT LONG-TERM CURRENT USE OF INSULIN: ICD-10-CM

## 2020-04-06 ENCOUNTER — TELEPHONE (OUTPATIENT)
Dept: PODIATRY | Facility: CLINIC | Age: 50
End: 2020-04-06

## 2020-04-06 RX ORDER — GABAPENTIN 600 MG/1
TABLET ORAL
Qty: 180 TABLET | Refills: 2 | Status: SHIPPED | OUTPATIENT
Start: 2020-04-06 | End: 2020-12-15 | Stop reason: SDUPTHER

## 2020-04-06 RX ORDER — PRAVASTATIN SODIUM 40 MG/1
TABLET ORAL
Qty: 90 TABLET | Refills: 2 | Status: ON HOLD | OUTPATIENT
Start: 2020-04-06 | End: 2021-02-20 | Stop reason: HOSPADM

## 2020-04-06 RX ORDER — LISINOPRIL 5 MG/1
TABLET ORAL
Qty: 90 TABLET | Refills: 2 | Status: ON HOLD | OUTPATIENT
Start: 2020-04-06 | End: 2021-02-20 | Stop reason: SDUPTHER

## 2020-04-06 RX ORDER — MELOXICAM 15 MG/1
TABLET ORAL
Qty: 30 TABLET | Refills: 2 | Status: SHIPPED | OUTPATIENT
Start: 2020-04-06 | End: 2020-07-06

## 2020-04-06 NOTE — TELEPHONE ENCOUNTER
Called the pt and left voicemail message concerning her 4/9 appointment. No answer left voicemail.

## 2020-04-07 DIAGNOSIS — B96.89 ACUTE BACTERIAL BRONCHITIS: ICD-10-CM

## 2020-04-07 DIAGNOSIS — J20.8 ACUTE BACTERIAL BRONCHITIS: ICD-10-CM

## 2020-04-07 RX ORDER — ALBUTEROL SULFATE 90 UG/1
2 AEROSOL, METERED RESPIRATORY (INHALATION) EVERY 6 HOURS PRN
Qty: 18 G | Refills: 0 | Status: SHIPPED | OUTPATIENT
Start: 2020-04-07 | End: 2020-05-04

## 2020-04-13 LAB
LEFT GREAT SAPHENOUS DISTAL THIGH DIA: 0.6 CM
LEFT GREAT SAPHENOUS DISTAL THIGH REFLUX: 864 MS
LEFT GREAT SAPHENOUS JUNCTION DIA: 1.5 CM
LEFT GREAT SAPHENOUS JUNCTION REFLUX: 0 MS
LEFT GREAT SAPHENOUS KNEE DIA: 0.5 CM
LEFT GREAT SAPHENOUS KNEE REFLUX: 0 MS
LEFT GREAT SAPHENOUS MIDDLE THIGH DIA: 0.5 CM
LEFT GREAT SAPHENOUS MIDDLE THIGH REFLUX: 0 MS
LEFT GREAT SAPHENOUS PROXIMAL CALF DIA: 0.3 CM
LEFT GREAT SAPHENOUS PROXIMAL CALF REFLUX: 499 MS
LEFT SMALL SAPHENOUS KNEE DIA: 0.4 CM
LEFT SMALL SAPHENOUS KNEE REFLUX: 0 MS
LEFT SMALL SAPHENOUS SPJ DIA: 0.3 CM
LEFT SMALL SAPHENOUS SPJ REFLUX: 0 MS

## 2020-04-21 ENCOUNTER — TELEPHONE (OUTPATIENT)
Dept: FAMILY MEDICINE | Facility: CLINIC | Age: 50
End: 2020-04-21

## 2020-04-21 ENCOUNTER — TELEPHONE (OUTPATIENT)
Dept: PODIATRY | Facility: CLINIC | Age: 50
End: 2020-04-21

## 2020-04-21 DIAGNOSIS — B35.3 TINEA PEDIS OF LEFT FOOT: ICD-10-CM

## 2020-04-21 DIAGNOSIS — B35.1 ONYCHOMYCOSIS OF RIGHT GREAT TOE: Primary | ICD-10-CM

## 2020-04-21 RX ORDER — CLOTRIMAZOLE 1 %
CREAM (GRAM) TOPICAL 2 TIMES DAILY
Qty: 28 G | Refills: 1 | Status: ON HOLD | OUTPATIENT
Start: 2020-04-21 | End: 2021-02-20 | Stop reason: HOSPADM

## 2020-04-21 NOTE — TELEPHONE ENCOUNTER
I spoke to the pt and she reports the Januvia she has been taking for about five weeks was causing diarrhea and stomach pain. She was also having itching. She stopped taking it 04/17/2020 as recommended by her pharmacist and her sx resolved. She is asking for an alternative medicine to be sent in. Please advise.

## 2020-04-21 NOTE — TELEPHONE ENCOUNTER
----- Message from Fanny Naa sent at 4/21/2020  9:51 AM CDT -----  Contact: self [phone]  .Type: Patient Call Back    Who called: self    What is the request in detail: Pt stated that she was told by the pharmacy to stop taking SITagliptin (JANUVIA) 100 MG Tab due to having side affects. Medication was cause trouble with her bowel movements  Pt needs to know if something else needs to be called in to replace medication.    Can the clinic reply by [facility]? Call back     Would the patient rather a call back or a response via [facility]? Call back     Best call back number: [phone]

## 2020-05-04 DIAGNOSIS — J20.8 ACUTE BACTERIAL BRONCHITIS: ICD-10-CM

## 2020-05-04 DIAGNOSIS — M54.50 CHRONIC MIDLINE LOW BACK PAIN WITHOUT SCIATICA: ICD-10-CM

## 2020-05-04 DIAGNOSIS — B96.89 ACUTE BACTERIAL BRONCHITIS: ICD-10-CM

## 2020-05-04 DIAGNOSIS — G89.29 CHRONIC MIDLINE LOW BACK PAIN WITHOUT SCIATICA: ICD-10-CM

## 2020-05-04 RX ORDER — ALBUTEROL SULFATE 90 UG/1
AEROSOL, METERED RESPIRATORY (INHALATION)
Qty: 18 G | Refills: 0 | Status: SHIPPED | OUTPATIENT
Start: 2020-05-04 | End: 2020-05-19 | Stop reason: SDUPTHER

## 2020-05-04 RX ORDER — METHOCARBAMOL 500 MG/1
TABLET, FILM COATED ORAL
Qty: 100 TABLET | Refills: 0 | Status: SHIPPED | OUTPATIENT
Start: 2020-05-04 | End: 2020-06-01

## 2020-05-19 DIAGNOSIS — J20.8 ACUTE BACTERIAL BRONCHITIS: ICD-10-CM

## 2020-05-19 DIAGNOSIS — J01.10 ACUTE NON-RECURRENT FRONTAL SINUSITIS: ICD-10-CM

## 2020-05-19 DIAGNOSIS — B96.89 ACUTE BACTERIAL BRONCHITIS: ICD-10-CM

## 2020-05-19 DIAGNOSIS — J30.89 NON-SEASONAL ALLERGIC RHINITIS, UNSPECIFIED TRIGGER: ICD-10-CM

## 2020-05-19 RX ORDER — ALBUTEROL SULFATE 90 UG/1
AEROSOL, METERED RESPIRATORY (INHALATION)
Qty: 18 G | Refills: 0 | Status: SHIPPED | OUTPATIENT
Start: 2020-05-19 | End: 2020-06-25

## 2020-05-19 RX ORDER — PANTOPRAZOLE SODIUM 40 MG/1
TABLET, DELAYED RELEASE ORAL
Qty: 30 TABLET | Refills: 5 | Status: SHIPPED | OUTPATIENT
Start: 2020-05-19 | End: 2020-09-16

## 2020-05-19 RX ORDER — LORATADINE 10 MG/1
10 TABLET ORAL DAILY
Qty: 30 TABLET | Refills: 5 | Status: ON HOLD | OUTPATIENT
Start: 2020-05-19 | End: 2021-02-20 | Stop reason: HOSPADM

## 2020-05-19 RX ORDER — FLUTICASONE PROPIONATE 50 MCG
SPRAY, SUSPENSION (ML) NASAL
Qty: 16 G | Refills: 3 | Status: ON HOLD | OUTPATIENT
Start: 2020-05-19 | End: 2021-02-20 | Stop reason: HOSPADM

## 2020-05-19 NOTE — TELEPHONE ENCOUNTER
----- Message from Lucille Santos sent at 5/19/2020  8:44 AM CDT -----  Contact: Patient   Type: RX Refill Request    Who Called: Patient     Have you contacted your pharmacy: no     Refill or New Rx: Refill     RX Name and Strength: pantoprazole (PROTONIX) 40 MG tablet, loratadine (CLARITIN) 10 mg tablet, albuterol (PROVENTIL/VENTOLIN HFA) 90 mcg/actuation inhaler, fluticasone propionate (FLONASE) 50 mcg/actuation nasal spray    How is the patient currently taking it? (ex. 1XDay):    Is this a 30 day or 90 day RX:    Preferred Pharmacy with phone number:   [id number] [address] AT [address] [address]  Phone: [phone] Fax: [phone]        Local or Mail Order: Local     Ordering Provider: Dr. Pena     Would the patient rather a call back or a response via [facility]? Call back     Best Call Back Number: [phone]

## 2020-05-21 ENCOUNTER — OFFICE VISIT (OUTPATIENT)
Dept: VASCULAR SURGERY | Facility: CLINIC | Age: 50
End: 2020-05-21
Payer: MEDICAID

## 2020-05-21 VITALS
WEIGHT: 233.44 LBS | HEIGHT: 66 IN | DIASTOLIC BLOOD PRESSURE: 74 MMHG | BODY MASS INDEX: 37.52 KG/M2 | SYSTOLIC BLOOD PRESSURE: 150 MMHG

## 2020-05-21 DIAGNOSIS — I87.2 VENOUS INSUFFICIENCY OF RIGHT LEG: ICD-10-CM

## 2020-05-21 DIAGNOSIS — M79.606 PAIN OF LOWER EXTREMITY, UNSPECIFIED LATERALITY: ICD-10-CM

## 2020-05-21 DIAGNOSIS — F17.200 NICOTINE DEPENDENCE: ICD-10-CM

## 2020-05-21 DIAGNOSIS — M25.50 ARTHRALGIA, UNSPECIFIED JOINT: Primary | ICD-10-CM

## 2020-05-21 DIAGNOSIS — I87.2 VENOUS INSUFFICIENCY OF BOTH LOWER EXTREMITIES: ICD-10-CM

## 2020-05-21 PROCEDURE — 99999 PR PBB SHADOW E&M-EST. PATIENT-LVL III: CPT | Mod: PBBFAC,,, | Performed by: SURGERY

## 2020-05-21 PROCEDURE — 99999 PR PBB SHADOW E&M-EST. PATIENT-LVL III: ICD-10-PCS | Mod: PBBFAC,,, | Performed by: SURGERY

## 2020-05-21 PROCEDURE — 99214 OFFICE O/P EST MOD 30 MIN: CPT | Mod: S$PBB,,, | Performed by: SURGERY

## 2020-05-21 PROCEDURE — 99213 OFFICE O/P EST LOW 20 MIN: CPT | Mod: PBBFAC | Performed by: SURGERY

## 2020-05-21 PROCEDURE — 99214 PR OFFICE/OUTPT VISIT, EST, LEVL IV, 30-39 MIN: ICD-10-PCS | Mod: S$PBB,,, | Performed by: SURGERY

## 2020-05-21 RX ORDER — DM/P-EPHED/ACETAMINOPH/DOXYLAM 30-7.5/3
LIQUID (ML) ORAL
Qty: 168 LOZENGE | Refills: 0 | Status: SHIPPED | OUTPATIENT
Start: 2020-05-21 | End: 2020-10-15

## 2020-05-21 NOTE — TELEPHONE ENCOUNTER
----- Message from Margarita Goodman sent at 5/21/2020  3:51 PM CDT -----  Contact: pt  Type: RX Refill Request    Who Called:pt    Have you contacted your pharmacy:    Refill or New Rx:nicotine polacrilex 2 MG Lozg     Nicotine patches    RX Name and Strength:    How is the patient currently taking it? (ex. 1XDay):    Is this a 30 day or 90 day RX:    Preferred Pharmacy with phone number:  [id number] - [place] [address] [place] [address]  Phone: [phone] Fax: [phone]            Local or Mail Order:    Ordering Provider:    Would the patient rather a call back or a response via [facility]? call    Best Call Back Number:[phone] (home)       Additional Information:

## 2020-05-21 NOTE — PATIENT INSTRUCTIONS
Putting on Compression Stockings     Turn the stocking inside-out, then fit it over your toes and heel.          Roll the stocking up your leg.            Once stockings are on, make sure the top of the stocking is about two fingers width below the crease of the knee (or the groin if you wear thigh-high stockings).          Use equipment, such as a stocking melisa, or wear rubber gloves to make it easier to put on compression stockings.         Elastic compression stockings are prescribed to treat many vein problems. Wearing them may be the most important thing you do to manage your symptoms. The stockings fit tightly around your ankle, gradually reducing in pressure as they go up your legs. This helps keep blood flowing to your heart. As a result, swelling is reduced. Your healthcare provider will prescribe stockings at a safe pressure for you. He or she will also tell you how often to wear and remove the stockings. Follow these instructions closely. Also, do not buy or wear compression stockings without first seeing your healthcare provider.  Tips for wear and care  To wear stockings safely and to get the most benefit:  · Wear the length prescribed by your healthcare provider.  · Pull them to the designated height and no farther. Dont let them bunch at the top. This can restrict blood flow and increase swelling.  · Wear the stockings for the amount of time your healthcare provider recommends. Replace them when they start to feel loose. This will likely be every 3 to 6 months.  · Remove them as your healthcare provider directs. When removed, wash your legs. Then check your legs and feet for sores. Call your healthcare provider if you find a sore. Dont put the stockings back on unless your healthcare provider directs.   · Wash the stockings as instructed. They may need to be hand-washed.  Date Last Reviewed: 5/1/2016  © 4824-6520 [employer]. [address] [place] [address], [place] [address]. All rights  reserved. This information is not intended as a substitute for professional medical care. Always follow your healthcare professional's instructions.        Understanding Chronic Venous Insufficiency  Problems with the veins in the legs may lead to chronic venous insufficiency (CVI). CVI means that there is a long-term problem with the veins not being able to pump blood back to your heart. When this happens, blood stays in the legs and causes swelling and aching.   Two problems that may lead to chronic venous insufficiency are:  · Damaged valves. Valves keep blood flowing from the legs through the blood vessels and back to the heart. When the valves are damaged, blood does not flow as well.   · Deep vein thrombosis (DVT). Blood clots may form in the deep veins of the legs. This may cause pain, redness, and swelling in the legs. It may also block the flow of blood back to the heart. Seek immediate medical care if you have these symptoms.  · A blood clot in the leg can also break off and travel to the lungs. This is called pulmonary embolism (PE). In the lungs, the clot can cut off the flow of blood. This may cause chest pain, trouble breathing, sweating, a fast heartbeat, coughing (may cough up blood), and fainting. It is a medical emergency and may cause death. Call 911 if you have these symptoms.  · Healthcare providers call the two conditions, DVT and PE, venous thromboembolism (VTE).  CVI cant be cured, but you can control leg swelling to reduce the likelihood of ulcers (sores).  Recognizing the symptoms  Be aware of the following:  · If you stand or sit with your feet down for long periods, your legs may ache or feel heavy.  · Swollen ankles are possibly the most common symptom of CVI.  · As swelling increases, the skin over your ankles may show red spots or a brownish tinge. The skin may feel leathery or scaly, and may start to itch.  · If swelling is not controlled, an ulcer (open wound) may form.  What you  can do  Reduce your risk of developing ulcers by doing the following:  · Increase blood flow back to your heart by elevating your legs, exercising daily, and wearing elastic stockings.  · Boost blood flow in your legs by losing excess weight.  · If you must stand or sit in one place for a period of time, keep your blood moving by wiggling your toes, shifting your body position, and rising up on the balls of your feet.    Date Last Reviewed: 5/1/2016 © 2000-2017 [employer]. [address] [place] [address], [place] [address]. All rights reserved. This information is not intended as a substitute for professional medical care. Always follow your healthcare professional's instructions.        Tips for Using Less Salt    Most people with heart problems need to eat less salt (sodium). Reducing the amount of salt you eat may help control your blood pressure. The higher your blood pressure, the greater your risk for heart disease, stroke, blindness, and kidney problems.  At the store  · Make low-salt choices by reading labels carefully. Look for the total amount of sodium per serving.  · Use more fresh food. Buy more fruits and vegetables. Select lean meats, fish, and poultry.  · Use fewer frozen, canned, and packaged foods which often contain a lot of sodium.  · Use plain frozen vegetables without sauces or toppings. These products are often low- or no-sodium.  · Opt for reduced-sodium or no-salt-added versions of canned vegetables and soups.  In the kitchen  · Don't add salt to food when you're cooking. Season with flavorings such as onion, garlic, pepper, salt-free herbal blends, and lemon or lime juice.  · Use a cookbook containing low-salt recipes. It can give you ideas for tasty meals that are healthy for your heart.  · Sprinkle salt-free herbal blends on vegetables and meat.  · Drain and rinse canned foods, such as canned beans and vegetables, before cooking or eating.  Eating out  · Tell the  you're on a  low-salt diet. Ask questions about the menu.  · Order fish, chicken, and meat broiled, baked, poached, or grilled without salt, butter, or breading.  · Use lemon, pepper, and salt-free herb mixes to add flavor.  · Choose plain steamed rice, boiled noodles, and baked or boiled potatoes. Top potatoes with chives and a little sour cream.     Beware! Salt goes by many other names. Limit foods with these words listed as ingredients: salt, sodium, soy sauce, baking soda, baking powder, MSG, monosodium, Na (the chemical symbol for sodium). Some antacids are also high in salt.   Date Last Reviewed: 6/19/2015 © 2000-2017 [employer]. [address] [place] [address]. All rights reserved. This information is not intended as a substitute for professional medical care. Always follow your healthcare professional's instructions.        Low-Salt Diet  This diet removes foods that are high in salt. It also limits the amount of salt you use when cooking. It is most often used for people with high blood pressure, edema (fluid retention), and kidney, liver, or heart disease.  Table salt contains the mineral sodium. Your body needs sodium to work normally. But too much sodium can make your health problems worse. Your healthcare provider is recommending a low-salt (also called low-sodium) diet for you. Your total daily allowance of salt is 1,500 to 2,300 milligrams (mg). It is less than 1 teaspoon of table salt. This means you can have only about 500 to 700 mg of sodium at each meal. People with certain health problems should limit salt intake to the lower end of the recommended range.    When you cook, dont add much salt. If you can cook without using salt, even better. Dont add salt to your food at the table.  When shopping, read food labels. Salt is often called sodium on the label. Choose foods that are salt-free, low salt, or very low salt. Note that foods with reduced salt may not lower your salt intake  enough.    Beans, potatoes, and pasta  Ok: Dry beans, split peas, lentils, potatoes, rice, macaroni, pasta, spaghetti without added salt  Avoid: Potato chips, tortilla chips, and similar products  Breads and cereals  Ok: Low-sodium breads, rolls, cereals, and cakes; low-salt crackers, matzo crackers  Avoid: Salted crackers, pretzels, popcorn, Amharic toast, pancakes, muffins  Dairy  Ok: Milk, chocolate milk, hot chocolate mix, low-salt cheeses, and yogurt  Avoid: Processed cheese and cheese spreads; Roquefort, Camembert, and cottage cheese; buttermilk, instant breakfast drink  Desserts  Ok: Ice cream, frozen yogurt, juice bars, gelatin, cookies and pies, sugar, honey, jelly, hard candy  Avoid: Most pies, cakes and cookies prepared or processed with salt; instant pudding  Drinks  Ok: Tea, coffee, fizzy (carbonated) drinks, juices  Avoid: Flavored coffees, electrolyte replacement drinks, sports drinks  Meats  Ok: All fresh meat, fish, poultry, low-salt tuna, eggs, egg substitute  Avoid: Smoked, pickled, brine-cured, or salted meats and fish. This includes chavez, chipped beef, corned beef, hot dogs, deli meats, ham, kosher meats, salt pork, sausage, canned tuna, salted codfish, smoked salmon, herring, sardines, or anchovies.  Seasonings and spices  Ok: Most seasonings are okay. Good substitutes for salt include: fresh herb blends, hot sauce, lemon, garlic, wilcox, vinegar, dry mustard, parsley, cilantro, horseradish, tomato paste, regular margarine, mayonnaise, unsalted butter, cream cheese, vegetable oil, cream, low-salt salad dressing and gravy.  Avoid: Regular ketchup, relishes, pickles, soy sauce, teriyaki sauce, Worcestershire sauce, BBQ sauce, tartar sauce, meat tenderizer, chili sauce, regular gravy, regular salad dressing, salted butter  Soups  Ok: Low-salt soups and broths made with allowed foods  Avoid: Bouillon cubes, soups with smoked or salted meats, regular soup and broth  Vegetables  Ok: Most vegetables  are okay; also low-salt tomato and vegetable juices  Avoid: Sauerkraut and other brine-soaked vegetables; pickles and other pickled vegetables; tomato juice, olives  Date Last Reviewed: 8/1/2016 © 2000-2017 [employer]. [address] [place] [address]. All rights reserved. This information is not intended as a substitute for professional medical care. Always follow your healthcare professional's instructions.        Low-Salt Choices  Eating salt (sodium) can make your body retain too much water. Excess water makes your heart work harder. Canned, packaged, and frozen foods are easy to prepare, but they are often high in sodium. Here are some ideas for low-salt foods you can easily prepare yourself.    For breakfast  · Fruit or 100% fruit juice  · Whole-wheat bread or an English muffin. Compare sodium content on labels.  · Low-fat milk or yogurt  · Unsalted eggs  · Shredded wheat  · Corn tortillas  · Unsalted steamed rice  · Regular (not instant) hot cereal, made without salt  Stay away from:  · Sausage, chavez, and ham  · Flour tortillas  · Packaged muffins, pancakes, and biscuits  · Instant hot cereals  · Cottage cheese  For lunch and dinner  · Fresh fish, chicken, turkey, or meat--baked, broiled, or roasted without salt  · Dry beans, cooked without salt  · Tofu, stir-fried without salt  · Unsalted fresh fruit and vegetables, or frozen or canned fruit and vegetables with no added salt  Stay away from:  · Lunch or deli meat that is cured or smoked  · Cheese  · Tomato juice and catsup  · Canned vegetables, soups, and fish not labeled as no-salt-added or reduced sodium  · Packaged gravies and sauces  · Olives, pickles, and relish  · Bottled salad dressings  For snacks and desserts  · Yogurt  · Unsalted, air popped popcorn  · Unsalted nuts or seeds  Stay away from:  · Pies and cakes  · Packaged dessert mixes  · Pizza  · Canned and packaged puddings  · Pretzels, chips, crackers, and nuts--unless  the label says unsalted  Date Last Reviewed: 6/17/2015  © 8168-1741 [employer]. [address] [place] [address], [place] [address]. All rights reserved. This information is not intended as a substitute for professional medical care. Always follow your healthcare professional's instructions.

## 2020-05-21 NOTE — LETTER
May 21, 2020        Donaldo Pena MD  [address] [facility] [address]             [facility]  [address]  [facility] [address]  Phone: [phone]  Fax: [phone]   Patient: Audrey Natarajan   MR Number: [id number]   YOB: 1970   Date of Visit: 5/21/2020       Dear Dr. Pena:    Thank you for referring Audrey Natarajan to me for evaluation. Below are the relevant portions of my assessment and plan of care.            If you have questions, please do not hesitate to call me. I look forward to following Audrey along with you.    Sincerely,      Tone Mata MD           CC  No Recipients

## 2020-05-21 NOTE — PROGRESS NOTES
"       Tone Mata MD RPVI                       [facility]                         05/21/2020    HPI:  Audrey Natarajan is a 49 y.o. female with   Patient Active Problem List   Diagnosis    Essential hypertension    COPD (chronic obstructive pulmonary disease)    Hypertriglyceridemia    Tobacco abuse    Mild protein malnutrition    Diabetic neuropathy    Leg swelling    Incisional hernia without mention of obstruction or gangrene    DM type 2 without retinopathy    History of DVT (deep vein thrombosis)    History of pulmonary embolus (PE)    Bipolar 1 disorder    Gastroparesis due to DM    Thrombocytosis    Leukocytosis    Morbid obesity    Type 2 diabetes mellitus    Acne    Other chronic pain    Vomiting and diarrhea    Nuclear sclerosis of both eyes    Bilateral ocular hypertension    Refractive error    Long term (current) use of anticoagulants    Hypercoagulable state    History of pulmonary embolism    DVT, recurrent, lower extremity, chronic, left    being managed by PCP and specialists who is here today for evaluation of BLE pain.  9/1/19 presented to ER due to RLE cramping and LLE edema.  S/p LLE DVT 2003, unprovoked and treated with anticoagulation.  S/p PE and L femoral and PT DVT 2013 and had a Bard retrievable filter placed 2013; has had persistent pain and edema since that time.  Repeat US 9/1/19 in ER due to pain/edema showed chronic L PT.  Pt states 2013 after she injured her LLE and had a bleeding vein she had a "vein stripping" to the outside of her LLE.  Patient states location is BLE occurring for 6 yrs.  Associated signs and symptoms include discoloration.  Quality is sharp/throbbing and severity is 10/10 at worst, average 7/10.  Symptoms began 6 yrs ago.  Alleviating factors include elevation.  Worsening factors include dependency.  Denies claudication.      no MI  no Stroke  Tobacco use: 3 cig/day; prev 1 ppd x 35 yrs    12/2019: c/o " "severe LLE pain.  +compression daily.  C/o stockings being too tight.  States bilateral foot paresthesias.      3/2020:  Cont to c/o LLE pain.  Cannot tolerate compression.      Interval history:  C/o sharp LLE pain that limits her ambulating and sleep despite OTC pain medications.      Past Medical History:   Diagnosis Date    ADHD (attention deficit hyperactivity disorder)     Arthritis     Asthma     Bipolar 1 disorder     Cataract     COPD (chronic obstructive pulmonary disease)     Coronary artery disease     A fib    Depression     bipolar manic depresson    Diabetes mellitus     DVT of lower extremity, bilateral July 2013    bilateral LE DVT. Estelita filter placed.     Encounter for blood transfusion     History of blood clots 1. Left Leg=2003; 2.Bilateral Groin=Blood Clots= 5 or 6/ 2013 & 7/2013; 3. LLL of Lung=7/2013;  4. Lt. Lower Leg=7/2013.     Pt. had 1st Blood Clot after [id number], & Last=2013. Estelita Filter= Rt.Lateral Neck.    HTN (hypertension) 6/6/2013    Pt states that she does not have hypertension    Hypercholesteremia     Irregular heartbeat     Neuromuscular disorder     neuropathy feet    PE (pulmonary embolism) July 2013     bilat LE DVT.     Restless leg syndrome      Past Surgical History:   Procedure Laterality Date    ABDOMINAL SURGERY      BILATERAL OOPHORECTOMY Bilateral 1/12/2015    Green' s filter Right 7/4/2012    Right Neck & Tunneled Down.    HERNIA REPAIR      "[facility] of Hernias Repaires around th [place].", pt. states    OOPHORECTOMY      OVARIAN CYST REMOVAL  3/13/2014    IL REMOVAL OF OVARY/TUBE(S)      SPLENECTOMY, TOTAL  July 2003    TONSILLECTOMY      as a child    TYMPANOSTOMY TUBE PLACEMENT  1976    VEIN SURGERY  2003    Lt leg     Family History   Problem Relation Age of Onset    Hypertension Father     Diabetes Father     Heart disease Father     Cataracts Father     Diabetes Paternal Grandfather     Heart disease " Paternal Grandfather     No Known Problems Mother     Ovarian cancer Maternal Grandmother          from this. ? age     No Known Problems Sister     No Known Problems Brother     No Known Problems Maternal Aunt     No Known Problems Maternal Uncle     No Known Problems Paternal Aunt     No Known Problems Paternal Uncle     No Known Problems Maternal Grandfather     Ovarian cancer Paternal Grandmother     Uterine cancer Neg Hx     Breast cancer Neg Hx     Colon cancer Neg Hx     Amblyopia Neg Hx     Blindness Neg Hx     Cancer Neg Hx     Glaucoma Neg Hx     Macular degeneration Neg Hx     Retinal detachment Neg Hx     Strabismus Neg Hx     Stroke Neg Hx     Thyroid disease Neg Hx      Social History     Socioeconomic History    Marital status:      Spouse name: Not on file    Number of children: Not on file    Years of education: Not on file    Highest education level: Not on file   Occupational History    Not on file   Social Needs    Financial resource strain: Not on file    Food insecurity:     Worry: Not on file     Inability: Not on file    Transportation needs:     Medical: Not on file     Non-medical: Not on file   Tobacco Use    Smoking status: Current Every Day Smoker     Packs/day: 0.50     Years: 25.00     Pack years: 12.50     Types: Cigarettes    Smokeless tobacco: Never Used   Substance and Sexual Activity    Alcohol use: No     Alcohol/week: 0.0 standard drinks    Drug use: No    Sexual activity: Not Currently   Lifestyle    Physical activity:     Days per week: Not on file     Minutes per session: Not on file    Stress: Not on file   Relationships    Social connections:     Talks on phone: Not on file     Gets together: Not on file     Attends Jainism service: Not on file     Active member of club or organization: Not on file     Attends meetings of clubs or organizations: Not on file     Relationship status: Not on file   Other Topics Concern    Not  on file   Social History Narrative    Not on file       Current Outpatient Medications:     acetaminophen (ARTHRITIS PAIN RELIEVER) 650 MG TbSR, Take 650 mg by mouth. Pt states taking 2 -3 times a day, Disp: , Rfl:     albuterol (ACCUNEB) 0.63 mg/3 mL Nebu, Take 3 mLs (0.63 mg total) by nebulization every 8 (eight) hours as needed (wheezing). Rescue, Disp: 1 Box, Rfl: 1    albuterol (PROVENTIL/VENTOLIN HFA) 90 mcg/actuation inhaler, INHALE 2 PUFFS BY MOUTH INTO THE LUNGS EVERY 6 HOURS AS NEEDED FOR WHEEZING. RESCUE, Disp: 18 g, Rfl: 0    aspirin (ECOTRIN) 81 MG EC tablet, Take 81 mg by mouth once daily. , Disp: , Rfl:     azelastine (ASTELIN) 137 mcg (0.1 %) nasal spray, 1 spray (137 mcg total) by Nasal route 2 (two) times daily., Disp: 30 mL, Rfl: 0    b complex vitamins tablet, Take 1 tablet by mouth once daily., Disp: 90 tablet, Rfl: 2    carbamazepine (TEGRETOL) 200 mg tablet, TK 2 TS PO BID, Disp: , Rfl: 1    ciclopirox (LOPROX) 0.77 % Susp, Apply topically once daily., Disp: 30 mL, Rfl: 3    ciclopirox-nail lacquer removr 8 % Kit, Apply 1 application topically once a week., Disp: 1 kit, Rfl: 4    clotrimazole (LOTRIMIN) 1 % cream, Apply topically 2 (two) times daily., Disp: 28 g, Rfl: 1    cyanocobalamin (VITAMIN B-12) 100 MCG tablet, Take 1 tablet (100 mcg total) by mouth once daily., Disp: 90 tablet, Rfl: 1    diphenhydrAMINE (BENADRYL) 50 MG capsule, Take 1 capsule (50 mg total) by mouth every 6 (six) hours as needed for Itching or Allergies (Swelling of the throat, rash and shortness of breath)., Disp: 20 capsule, Rfl: 0    DUPIXENT 300 mg/2 mL Syrg, inject 300mg SUBCUTANEOUSLY every other week, Disp: , Rfl: 6    famotidine (PEPCID) 20 MG tablet, Take 1 tablet (20 mg total) by mouth 2 (two) times daily., Disp: 10 tablet, Rfl: 0    fish oil-omega-3 fatty acids 300-1,000 mg capsule, Take 2 capsules by mouth once daily. Instructed to stop 5-7 days before surgery (8/11/16)., Disp: 60 capsule,  Rfl: 5    furosemide (LASIX) 20 MG tablet, TAKE 1 TABLET(20 MG) BY MOUTH EVERY DAY, Disp: 30 tablet, Rfl: 2    gabapentin (NEURONTIN) 600 MG tablet, TAKE 1 TABLET(600 MG) BY MOUTH TWICE DAILY, Disp: 180 tablet, Rfl: 2    hydrOXYzine pamoate (VISTARIL) 25 MG Cap, , Disp: , Rfl:     ibuprofen (ADVIL,MOTRIN) 600 MG tablet, TAKE 1 TABLET(600 MG) BY MOUTH EVERY 8 HOURS AS NEEDED FOR PAIN, Disp: 60 tablet, Rfl: 1    lidocaine 3 % Crea, Rub on leg for pain as needed no more than 3 times a day, Disp: 1 Tube, Rfl: 0    lisinopriL (PRINIVIL,ZESTRIL) 5 MG tablet, TAKE 1 TABLET(5 MG) BY MOUTH EVERY DAY, Disp: 90 tablet, Rfl: 2    loratadine (CLARITIN) 10 mg tablet, Take 1 tablet (10 mg total) by mouth once daily., Disp: 30 tablet, Rfl: 5    metFORMIN (GLUCOPHAGE) 1000 MG tablet, Take 1 tablet (1,000 mg total) by mouth 2 (two) times daily with meals., Disp: 180 tablet, Rfl: 2    metoprolol tartrate (LOPRESSOR) 50 MG tablet, Take 1 tablet (50 mg total) by mouth 2 (two) times daily., Disp: 60 tablet, Rfl: 2    mometasone-formoterol (DULERA) 200-5 mcg/actuation inhaler, Inhale 1 puff into the lungs 2 (two) times daily., Disp: 8.8 g, Rfl: 1    multivitamin (THERAGRAN) per tablet, Take 1 tablet by mouth every morning., Disp: 90 tablet, Rfl: 2    nystatin (MYCOSTATIN) powder, Apply topically 2 (two) times daily., Disp: 60 g, Rfl: 2    pantoprazole (PROTONIX) 40 MG tablet, TAKE 1 TABLET(40 MG) BY MOUTH EVERY DAY, Disp: 30 tablet, Rfl: 5    pravastatin (PRAVACHOL) 40 MG tablet, TAKE 1 TABLET(40 MG) BY MOUTH EVERY DAY, Disp: 90 tablet, Rfl: 2    risperidone (RISPERDAL) 3 MG Tab, take at bedtime, Disp: , Rfl: 1    VYVANSE 50 mg capsule, TK ONE C PO QAM, Disp: , Rfl: 0    blood-glucose meter kit, To check BG once daily, to use with insurance preferred meter, Disp: 1 each, Rfl: 0    fluticasone propionate (FLONASE) 50 mcg/actuation nasal spray, SHAKE LIQUID AND USE 1 SPRAY(50 MCG) IN EACH NOSTRIL TWICE DAILY (Patient not  taking: Reported on 5/21/2020), Disp: 16 g, Rfl: 3    lancets Misc, To check BG once daily, to use with insurance preferred meter (Patient not taking: Reported on 5/21/2020), Disp: 30 each, Rfl: 2    meloxicam (MOBIC) 15 MG tablet, TAKE 1 TABLET(15 MG) BY MOUTH EVERY DAY (Patient not taking: Reported on 5/21/2020), Disp: 30 tablet, Rfl: 2    methocarbamoL (ROBAXIN) 500 MG Tab, TAKE 1 TABLET(500 MG) BY MOUTH EVERY 6 HOURS AS NEEDED (Patient not taking: Reported on 5/21/2020), Disp: 100 tablet, Rfl: 0    nicotine polacrilex 2 MG Lozg, 1-2 per hour in place of cigarettes maximum of 20 per day. (Patient not taking: Reported on 5/21/2020), Disp: 168 lozenge, Rfl: 0    ondansetron (ZOFRAN) 4 MG tablet, Take 1 tablet (4 mg total) by mouth every 8 (eight) hours as needed for Nausea. (Patient not taking: Reported on 5/21/2020), Disp: 30 tablet, Rfl: 0    SITagliptin (JANUVIA) 100 MG Tab, Take 1 tablet (100 mg total) by mouth once daily. (Patient not taking: Reported on 5/21/2020), Disp: 90 tablet, Rfl: 0    REVIEW OF SYSTEMS:  General: No fevers or chills; ENT: No sore throat; Allergy and Immunology: no persistent infections; Hematological and Lymphatic: No history of bleeding or easy bruising; Endocrine: negative; Respiratory: no cough, shortness of breath, or wheezing; Cardiovascular: no chest pain or dyspnea on exertion; Gastrointestinal: no abdominal pain/back, change in bowel habits, or bloody stools; Genito-Urinary: no dysuria, trouble voiding, or hematuria; Musculoskeletal: negative; Neurological: no TIA or stroke symptoms; Psychiatric: no nervousness, anxiety or depression.    PHYSICAL EXAM:                General appearance:  Alert, well-appearing, and in no distress.  Oriented to person, place, and time                    Neurological: Normal speech, no focal findings noted; CN II - XII grossly intact. RLE with sensation to light touch, LLE with sensation to light touch.             Musculoskeletal: Digits/nail without cyanosis/clubbing.  Strength 5/5 BLE.                    Neck: Supple, no significant adenopathy, no carotid bruit can be auscultated                  Chest:  Clear to auscultation, no wheezes, rales or rhonchi, symmetric air entry. No use of accessory muscles               Cardiac: Normal rate and regular rhythm, S1 and S2 normal            Abdomen: Soft, nontender, nondistended, no masses or organomegaly, no hernia     No rebound tenderness noted; bowel sounds normal     Pulsatile aortic mass is non palpable.     No groin adenopathy      Extremities:        2+ R DP pulse, 2+ L DP pulse     1+ RLE edema, 1+ LLE edema    Skin: RLE without wounds; LLE without wounds    LAB RESULTS:  No results found for: CBC  Lab Results   Component Value Date    LABPROT 9.7 02/28/2020    INR 0.9 02/28/2020     Lab Results   Component Value Date     02/28/2020    K 4.5 02/28/2020    CL 99 02/28/2020    CO2 29 02/28/2020     (H) 02/28/2020    BUN 8 02/28/2020    CREATININE 0.7 02/28/2020    CALCIUM 9.3 02/28/2020    ANIONGAP 11 02/28/2020    EGFRNONAA >60 02/28/2020     Lab Results   Component Value Date    WBC 11.60 02/28/2020    RBC 4.86 02/28/2020    HGB 13.8 02/28/2020    HCT 43.5 02/28/2020    MCV 90 02/28/2020    MCH 28.4 02/28/2020    MCHC 31.7 (L) 02/28/2020    RDW 14.5 02/28/2020     (H) 02/28/2020    MPV 10.3 02/28/2020    GRAN 4.5 02/28/2020    GRAN 39.0 02/28/2020    LYMPH 5.3 (H) 02/28/2020    LYMPH 46.0 02/28/2020    MONO 1.0 02/28/2020    MONO 8.8 02/28/2020    EOS 0.5 02/28/2020    BASO 0.13 02/28/2020    EOSINOPHIL 4.7 02/28/2020    BASOPHIL 1.1 02/28/2020    DIFFMETHOD Automated 02/28/2020     .  Lab Results   Component Value Date    HGBA1C 7.7 (H) 02/28/2020       IMAGING:  All pertinent imaging has been reviewed and interpreted independently.    Venous US 9/2019: Left 1 of 2 PT vein with thrombus present, similar to previous US     9/16/19 JEYSON:  1. Right  lower extremity pressures and waveforms indicate no hemodynamically significant arterial occlusive disease.  2. Left lower extremity pressures and waveforms indicate no hemodynamically significant arterial occlusive disease.     Venous reflux 12/12/19: RLE with GSV reflux at distal thigh to calf.  No DVT.    DVT LLE US 12/16/19: No LLE DVT    LLE venous US 3/2020:  Left lower extremity venous ultrasound shows greater saphenous vein reflux at the distal thigh.  There is popliteal vein reflux.  There is no lesser saphenous vein reflux.  There is no anterior accessory saphenous venous reflux.  There is no DVT.    IMP/PLAN:  49 y.o. female with   Patient Active Problem List   Diagnosis    Essential hypertension    COPD (chronic obstructive pulmonary disease)    Hypertriglyceridemia    Tobacco abuse    Mild protein malnutrition    Diabetic neuropathy    Leg swelling    Incisional hernia without mention of obstruction or gangrene    DM type 2 without retinopathy    History of DVT (deep vein thrombosis)    History of pulmonary embolus (PE)    Bipolar 1 disorder    Gastroparesis due to DM    Thrombocytosis    Leukocytosis    Morbid obesity    Type 2 diabetes mellitus    Acne    Other chronic pain    Vomiting and diarrhea    Nuclear sclerosis of both eyes    Bilateral ocular hypertension    Refractive error    Long term (current) use of anticoagulants    Hypercoagulable state    History of pulmonary embolism    DVT, recurrent, lower extremity, chronic, left    being managed by PCP and specialists who is here today for evaluation of BLE pain and edema.    -LLE pain and edema likely due to post thrombotic syndrome, no DVT on repeat venous duplex US with L distal thigh GSV reflux -recommend compression with Rx stockings, elevation, dietary changes associated with water and sodium intake discussed at length with patient - wrap Rx today  -R GSV distal reflux asymptomatic- recommend compression with Rx  stockings, elevation, dietary changes associated with water and sodium intake discussed at length with patient  -Rec cont exercise  -Rec optimization of neuropathic and MSK pain with NSGY, Pain mgmt and Rheumatology - do not feel that laser ablation of isolated distal thigh GSV reflux will resolve her pain, will address once pain optimized and determine if EVLT necessary at that time  -Cont Hematology for comprehensive mgmt of thrombotic events  -RTC 3 mo for further evaluation    I spent 14 minutes evaluating this patient and greater than 50% of the time was spent counseling, coordinator care and discussing the plan of care.  All questions were answered and patient stated understanding with agreement with the above treatment plan.    Tone Mata MD RPVI  Vascular and Endovascular Surgery

## 2020-05-31 DIAGNOSIS — M54.50 CHRONIC MIDLINE LOW BACK PAIN WITHOUT SCIATICA: ICD-10-CM

## 2020-05-31 DIAGNOSIS — G89.29 CHRONIC MIDLINE LOW BACK PAIN WITHOUT SCIATICA: ICD-10-CM

## 2020-05-31 DIAGNOSIS — B35.9 TINEA: ICD-10-CM

## 2020-06-01 ENCOUNTER — CLINICAL SUPPORT (OUTPATIENT)
Dept: REHABILITATION | Facility: HOSPITAL | Age: 50
End: 2020-06-01
Attending: INTERNAL MEDICINE
Payer: MEDICAID

## 2020-06-01 DIAGNOSIS — M25.572 CHRONIC PAIN OF LEFT ANKLE: ICD-10-CM

## 2020-06-01 DIAGNOSIS — R29.898 DECREASED STRENGTH OF LOWER EXTREMITY: ICD-10-CM

## 2020-06-01 DIAGNOSIS — M25.672 DECREASED RANGE OF MOTION OF LEFT ANKLE: ICD-10-CM

## 2020-06-01 DIAGNOSIS — R26.9 GAIT ABNORMALITY: ICD-10-CM

## 2020-06-01 DIAGNOSIS — R26.89 BALANCE PROBLEM: ICD-10-CM

## 2020-06-01 DIAGNOSIS — G89.29 CHRONIC PAIN OF LEFT ANKLE: ICD-10-CM

## 2020-06-01 PROCEDURE — 97110 THERAPEUTIC EXERCISES: CPT | Mod: PN

## 2020-06-01 PROCEDURE — 97161 PT EVAL LOW COMPLEX 20 MIN: CPT | Mod: PN

## 2020-06-01 RX ORDER — NYSTATIN 100000 [USP'U]/G
POWDER TOPICAL 2 TIMES DAILY
Qty: 60 G | Refills: 2 | Status: SHIPPED | OUTPATIENT
Start: 2020-06-01 | End: 2020-09-03

## 2020-06-01 RX ORDER — METHOCARBAMOL 500 MG/1
TABLET, FILM COATED ORAL
Qty: 100 TABLET | Refills: 0 | Status: SHIPPED | OUTPATIENT
Start: 2020-06-01 | End: 2020-06-09 | Stop reason: ALTCHOICE

## 2020-06-03 DIAGNOSIS — G89.29 OTHER CHRONIC PAIN: Primary | ICD-10-CM

## 2020-06-04 PROBLEM — R26.9 GAIT ABNORMALITY: Status: ACTIVE | Noted: 2020-06-04

## 2020-06-04 PROBLEM — R26.89 BALANCE PROBLEM: Status: ACTIVE | Noted: 2020-06-04

## 2020-06-04 PROBLEM — M25.673 DECREASED ROM OF ANKLE: Status: ACTIVE | Noted: 2020-06-04

## 2020-06-04 PROBLEM — R29.898 DECREASED STRENGTH OF LOWER EXTREMITY: Status: ACTIVE | Noted: 2020-06-04

## 2020-06-04 NOTE — PLAN OF CARE
"  [facility]  Physical Therapy Initial Evaluation    Name: Audrey Natarajan  Clinic Number: [id number]    Therapy Diagnosis:   Encounter Diagnoses   Name Primary?    Chronic pain of left ankle     Decreased range of motion of left ankle     Decreased strength of lower extremity     Balance problem     Gait abnormality      Physician: Donaldo Pena MD    Physician Orders: PT Eval and Treat   Medical Diagnosis from Referral: M25.572,G89.29 (ICD-10-CM) - Chronic pain of left ankle  Evaluation Date: 6/1/2020  Authorization Period Expiration: 12/31/2020  Plan of Care Expiration: 9/1/2020  Visit # / Visits authorized: 1/ 1    Time In: 5:10   Time Out: 5:55  Total Billable Time: 45 minutes     Precautions: T2DM, COPD    Subjective   Date of onset: A few months ago  History of current condition - Audrey reports: initially she had L achilles pain/injury. States she wore long boot for about 12 weeks. Pt reports she was then put into a shorter boot. States that then she was put in the "shoe boot". States that she wore this boot for 9 weeks. Pt states that since this she has been wearing ankle started off with pain across the top of ankle. Pt states that her ankle swells a lot throughout the day. Pt states that sometimes the pain is stabbing, sometimes it is throbbing. Pt states that the most of her pain is around the ankle joint.     Medical History:   Past Medical History:   Diagnosis Date    ADHD (attention deficit hyperactivity disorder)     Arthritis     Asthma     Bipolar 1 disorder     Cataract     COPD (chronic obstructive pulmonary disease)     Coronary artery disease     A fib    Depression     bipolar manic depresson    Diabetes mellitus     DVT of lower extremity, bilateral July 2013    bilateral LE DVT. [place] filter placed.     Encounter for blood transfusion     History of blood clots 1. Left Leg=2003; 2.Bilateral Groin=Blood Clots= 5 or 6/ 2013 & 7/2013; 3. " LLL of Lung=7/2013;  4. Lt. Lower Leg=7/2013.     Pt. had 1st Blood Clot after Rstpajlyghpq=[id number], & Last=2013. [place] Filter= Rt.Lateral Neck.    HTN (hypertension) 6/6/2013    Pt states that she does not have hypertension    Hypercholesteremia     Irregular heartbeat     Neuromuscular disorder     neuropathy feet    PE (pulmonary embolism) July 2013     bilat LE DVT.     Restless leg syndrome        Surgical History:   Audrey Natarajan  has a past surgical history that includes Splenectomy, total (July 2003); Vein Surgery (2003); Green' s filter (Right, 7/4/2012); Tonsillectomy; Abdominal surgery; Ovarian cyst removal (3/13/2014); Hernia repair; Bilateral oophorectomy (Bilateral, 1/12/2015); pr removal of ovary/tube(s); Tympanostomy tube placement (1976); and Oophorectomy.    Medications:   Audrey has a current medication list which includes the following prescription(s): arthritis pain reliever, albuterol, albuterol, aspirin, azelastine, b complex vitamins, blood-glucose meter, carbamazepine, ciclopirox, ciclopirox-nail lacquer removr, clotrimazole, cyanocobalamin, diphenhydramine, dupixent, famotidine, fish oil-omega-3 fatty acids, fluticasone propionate, furosemide, gabapentin, hydroxyzine pamoate, ibuprofen, lancets, lidocaine, lisinopril, loratadine, meloxicam, metformin, methocarbamol, metoprolol tartrate, mometasone-formoterol, multivitamin, nicotine polacrilex, nystatin, ondansetron, pantoprazole, pravastatin, risperidone, sitagliptin, and vyvanse.    Allergies:   Review of patient's allergies indicates:   Allergen Reactions    Morphine Other (See Comments)     Patient had a psychotic episode after taking Morphine  Agitation, hallucinations    Penicillins Anaphylaxis     itching        Imaging, X-Ray foot 9/30/2019:       Prior Therapy: prior PT for back   Social History: lives with their spouse  Occupation: not working   Prior Level of Function: limited in heavy lifting due to back pain  "  Current Level of Function: able to complete all activities of daily living, but with increased pain     Pain:  Current 8/10, worst 10/10, best 5/10   Location:left ankle, lower leg   Description: burning, throbbing, feels heavy   Aggravating Factors: laying down at night following a day of standing  Easing Factors: NSAIDS, pain medication     Pts goals: "I would like to be able to fall asleep without pain"    Objective     Posture Alignment: Pt with B decreased medial arch in full WB    Sensation: Impaired sensation B plantar aspect 2/2 diabetes    GAIT DEVIATIONS: Audrey displays decreased step length, poor push bilaterally     Ankle Range of Motion:   Ankle Right Left   Dorsiflexion 10/20 2/10   Plantarflexion WFL 30   Inversion 30 30   Eversion 30 20      Strength:   Right Ankle   Left Ankle    Dorsiflexion: 5/5 Dorsiflexion: 4/5   Plantarflexion:  5/5 Plantarflexion: 3+/5   Inversion: 5/5 Inversion: 3/5 **   Eversion: 5/5 Eversion: 3/5       Right LE  Left LE    Hip flexion: 5/5 Hip flexion: 4-/5   Hip extension:  3+/5 Hip extension: 3+/5   Hip abduction:  seated 4/5 Hip abduction:  seated 4/5   Hip adduction:  seated 4/5 Hip adduction  seated 4/5   Knee extension: 5/5 Knee extension: 5/5   Knee flexion:  seated 4/5 Knee flexion:  seated 4/5     Joint Mobility: Decreased mobility A/P and P/A to L ankle joint    Palpation: Increased TTP to anterior/posterior/lateral joint, achilles tendon, posterior tibialis    Flexibility: Decreased flexibility to L gastroc/soleus complex         TREATMENT   Treatment Time In: 5:45 pm   Treatment Time Out: 5:55 pm  Total Treatment time separate from Evaluation: 00 minutes      Audrey received therapeutic exercises to develop strength, ROM and flexibility for 10 minutes including:  +Ankle Pumps 2x10  +Ankle ABCs x 2 trials  +Gastroc Stretch 3x30    Home Exercises and Patient Education Provided    Education provided:   - role of PT, plan of care, goals, scheduling limitations, " cancellation policies    Written Home Exercises Provided: HEP to be provided next session.    Assessment   Audrey is a 49 y.o. female referred to outpatient Physical Therapy with a medical diagnosis of chronic pain of L ankle. Pt presents with signs and symptoms consistent with medical diagnoses. Pt with decreased L ankle ROM. Pt also with decreased flexibility, increased muscle tone, and increased TTP to gastroc/soleus complex and posterior tibialis. Pt with poor tolerance to L foot weightbearing activities and impaired balance on L LE. Pt with increased pain during all daily activities and unable to ambulate without boot at this time. Pt would benefit from skilled PT services in order to address listed deficits, establish HEP, improve tolerance to weightbearing, and decrease pain in order to return to PLOF. Pt is motivated to participate in therapy and is in agreement with POC.     Pt prognosis is Good.   Pt will benefit from skilled outpatient Physical Therapy to address the deficits stated above and in the chart below, provide pt/family education, and to maximize pt's level of independence.     Plan of care discussed with patient: Yes  Pt's spiritual, cultural and educational needs considered and patient is agreeable to the plan of care and goals as stated below:     Anticipated Barriers for therapy: chronicity of pain, compliance with therapy    Medical Necessity is demonstrated by the following  History  Co-morbidities and personal factors that may impact the plan of care Co-morbidities:   COPD, T2DM, Bipolar 1 Disorder, HTN    Personal Factors:   no deficits     Complexity: low   Examination  Body Structures and Functions, activity limitations and participation restrictions that may impact the plan of care Body Regions:   lower extremities    Body Systems:    ROM  strength  gross coordinated movement  balance  gait  motor control    Participation Restrictions:   Ability to walk without boot in  community    Activity limitations:   Learning and applying knowledge  no deficits    General Tasks and Commands  no deficits    Communication  no deficits    Mobility  walking    Self care  no deficits    Domestic Life  shopping  cooking  doing house work (cleaning house, washing dishes, laundry)    Interactions/Relationships  no deficits    Life Areas  no deficits    Community and Social Life  no deficits         Complexity: low  See FOTO outcome assessment   Clinical Presentation stable and uncomplicated low   Decision Making/ Complexity Score: low     Goals:  GOALS: Short Term Goals:  4 weeks  1.Report decreased  in  pain at worse less than  <   / =  7/10  to increase tolerance for improved functional actvities  2. Pt to improve range of motion by 25% to allow for improved functional mobility to allow for improvement in IADLs.   3. Increased L ankle MMT 1/2 grade  to increase tolerance for ADL and work activities.  4. Pt to demonstrate ability to walk x 50 ft with no boot and pain < / = 5/10 in order to return to daily activities.   5. Pt to tolerate HEP to improve ROM and independence with ADL's    Long Term Goals: 8 weeks  1.Report decreased  in  pain at worse  less than  <   / =  3  /10  to increase tolerance for improved functional actvities  2.Pt to improve range of motion by 75% to allow for improved functional mobility to allow for improvement in IADLs.   3.Increased L ankle MMT 1 grade  to increase tolerance for ADL and work activities.  4. Pt will score > / =  45% on  FOTO outcome assessment  to increase functional activities and mobility   5. Pt to be Independent with HEP to improve ROM and independence with ADL's  6. Pt to demonstrate ability to walk x 100 ft without boot and pain < / = 2/10 in order to return to daily activities.       Plan   Plan of care Certification: 6/1/2020 to 9/1/20.    Outpatient Physical Therapy 2 times weekly for 6 weeks to include the following interventions: Gait  Training, Manual Therapy, Moist Heat/ Ice, Neuromuscular Re-ed, Patient Education, Therapeutic Activites and Therapeutic Exercise.       Anupama Augustin, PT, DPT  6/1/2020

## 2020-06-09 ENCOUNTER — OFFICE VISIT (OUTPATIENT)
Dept: URGENT CARE | Facility: CLINIC | Age: 50
End: 2020-06-09
Payer: MEDICAID

## 2020-06-09 VITALS
OXYGEN SATURATION: 98 % | SYSTOLIC BLOOD PRESSURE: 139 MMHG | HEART RATE: 85 BPM | TEMPERATURE: 98 F | DIASTOLIC BLOOD PRESSURE: 61 MMHG

## 2020-06-09 DIAGNOSIS — M79.602 PAIN IN LEFT ARM: ICD-10-CM

## 2020-06-09 DIAGNOSIS — M54.2 NECK PAIN ON LEFT SIDE: Primary | ICD-10-CM

## 2020-06-09 DIAGNOSIS — R07.9 CHEST PAIN, UNSPECIFIED TYPE: ICD-10-CM

## 2020-06-09 DIAGNOSIS — M54.32 SCIATICA, LEFT SIDE: ICD-10-CM

## 2020-06-09 DIAGNOSIS — R10.9 ACUTE LEFT FLANK PAIN: ICD-10-CM

## 2020-06-09 DIAGNOSIS — L03.011 PARONYCHIA OF FINGER OF RIGHT HAND: ICD-10-CM

## 2020-06-09 LAB
BILIRUB UR QL STRIP: POSITIVE
GLUCOSE UR QL STRIP: NEGATIVE
KETONES UR QL STRIP: NEGATIVE
LEUKOCYTE ESTERASE UR QL STRIP: NEGATIVE
PH, POC UA: 5 (ref 5–8)
POC BLOOD, URINE: NEGATIVE
POC NITRATES, URINE: NEGATIVE
PROT UR QL STRIP: POSITIVE
SP GR UR STRIP: 1.02 (ref 1–1.03)
UROBILINOGEN UR STRIP-ACNC: NORMAL (ref 0.1–1.1)

## 2020-06-09 PROCEDURE — 81003 POCT URINALYSIS, DIPSTICK, AUTOMATED, W/O SCOPE: ICD-10-PCS | Mod: QW,S$GLB,, | Performed by: PHYSICIAN ASSISTANT

## 2020-06-09 PROCEDURE — 93005 EKG 12-LEAD: ICD-10-PCS | Mod: S$GLB,,, | Performed by: PHYSICIAN ASSISTANT

## 2020-06-09 PROCEDURE — 99214 OFFICE O/P EST MOD 30 MIN: CPT | Mod: 25,S$GLB,, | Performed by: PHYSICIAN ASSISTANT

## 2020-06-09 PROCEDURE — 99214 PR OFFICE/OUTPT VISIT, EST, LEVL IV, 30-39 MIN: ICD-10-PCS | Mod: 25,S$GLB,, | Performed by: PHYSICIAN ASSISTANT

## 2020-06-09 PROCEDURE — 93010 EKG 12-LEAD: ICD-10-PCS | Mod: S$GLB,,, | Performed by: INTERNAL MEDICINE

## 2020-06-09 PROCEDURE — 81003 URINALYSIS AUTO W/O SCOPE: CPT | Mod: QW,S$GLB,, | Performed by: PHYSICIAN ASSISTANT

## 2020-06-09 PROCEDURE — 93005 ELECTROCARDIOGRAM TRACING: CPT | Mod: S$GLB,,, | Performed by: PHYSICIAN ASSISTANT

## 2020-06-09 PROCEDURE — 93010 ELECTROCARDIOGRAM REPORT: CPT | Mod: S$GLB,,, | Performed by: INTERNAL MEDICINE

## 2020-06-09 RX ORDER — CYCLOBENZAPRINE HCL 5 MG
5 TABLET ORAL 3 TIMES DAILY PRN
Qty: 9 TABLET | Refills: 0 | Status: SHIPPED | OUTPATIENT
Start: 2020-06-09 | End: 2020-06-10 | Stop reason: SDUPTHER

## 2020-06-09 RX ORDER — SULFAMETHOXAZOLE AND TRIMETHOPRIM 800; 160 MG/1; MG/1
1 TABLET ORAL 2 TIMES DAILY
Qty: 14 TABLET | Refills: 0 | Status: SHIPPED | OUTPATIENT
Start: 2020-06-09 | End: 2020-06-16

## 2020-06-09 NOTE — PROGRESS NOTES
"Subjective:       Patient ID: Audrey Natarajan is a 49 y.o. female.    Vitals:  temperature is 98.4 °F (36.9 °C). Her blood pressure is 139/61 and her pulse is 85. Her oxygen saturation is 98%.     Chief Complaint: Pain    Mrs. Natarajan is a 50yo female with a PMHx of bilat DVTs, PE, HTN, CAD, DM II with neuropathy, COPD, OA (pt repots of neck and lower back), left sided sciatica, RA, restless leg syndrome, tobacco abuse, and bipolar I who presents to the urgent care with multiple complaints.   She c/o left sided flank to lower back pain, which she believes is her sciatica "acting up." She describes pain as a burning sensation that radiates down to her knee. States she does not recall a specific injury or trauma to the area, but states she does get flare ups pretty frequently. Patient reports neuropathy to Bilat LE at baseline, which she states is no worse today. Denies saddle anesthesia, bowel/bladder incontinence.   She also c/o left sided neck pain that radiates down into left arm. States symptoms are worsened with movement and are sometimes associated with pain to her anterior chest. She does endorse a history of arthritis in her neck that is "worse on the left side." She does not recall any injury or trauma to the area, but reports the pain started about one week ago when she woke up one morning. Denies chest pain today. Denies palpitations, SOB, abdominal pain, N/V/D, fever, chills, headaches, changes in vision. States she has been taking tylenol arthritis (last dose 2:00pm today) as well as robaxin TID PRN (last dose also at 2:00pm today) for the sciatic and neck pain, which she reports is helping, but does not completely resolve pain. She is wondering if there is something stronger she can take.  She also c/o pain, swelling and discharge from cuticle of ring finger of right hand. States she believes it may be infection after cutting her cuticle with a finger nail clipper at home. States she has been using " hydrogen peroxide, antibacterial soap washes, and bactroban ointment with no improvement or worsening of issue. Denies fever, chills, streaking from wound, etc.    Pain   This is a chronic problem. The problem has been waxing and waning. Associated symptoms include chest pain and neck pain. Pertinent negatives include no abdominal pain, arthralgias, chills, congestion, coughing, diaphoresis, fatigue, fever, headaches, joint swelling, myalgias, nausea, rash, sore throat, vertigo, vomiting or weakness. The treatment provided mild relief.       Constitution: Negative for chills, sweating, fatigue and fever.   HENT: Negative for ear pain, ear discharge, congestion, postnasal drip, sinus pain, sinus pressure and sore throat.    Neck: Positive for neck pain and degenerative disc disease. Negative for neck stiffness and painful lymph nodes.   Cardiovascular: Positive for chest pain. Negative for leg swelling, palpitations and sob on exertion. Chest trauma: occasional, none currently.   Eyes: Negative for vision loss, double vision and blurred vision.   Respiratory: Negative for chest tightness, cough and shortness of breath.    Gastrointestinal: Negative for abdominal pain, nausea, vomiting and diarrhea.   Genitourinary: Positive for flank pain. Negative for dysuria, frequency, urgency, bladder incontinence, bed wetting, hematuria and history of kidney stones.   Musculoskeletal: Positive for pain, arthritis and back pain. Negative for joint pain, joint swelling, muscle cramps and muscle ache.   Skin: Negative for color change, pale, rash and bruising.   Allergic/Immunologic: Negative for seasonal allergies.   Neurological: Negative for dizziness, history of vertigo, light-headedness, passing out and headaches.   Hematologic/Lymphatic: Negative for swollen lymph nodes.   Psychiatric/Behavioral: Negative for nervous/anxious, sleep disturbance and depression. The patient is not nervous/anxious.        Objective:      Physical  Exam   Constitutional: She is oriented to person, place, and time. Vital signs are normal. She appears well-developed and well-nourished. She is active and cooperative.  Non-toxic appearance. She does not have a sickly appearance. She does not appear ill. No distress.   Patient is sitting on exam table in no acute distress. Nontoxic appearing.    HENT:   Head: Normocephalic and atraumatic.   Nose: Nose normal.   Mouth/Throat: Oropharynx is clear and moist and mucous membranes are normal.   Eyes: Pupils are equal, round, and reactive to light. Conjunctivae, EOM and lids are normal.   Neck: Trachea normal, normal range of motion, full passive range of motion without pain and phonation normal. Neck supple.       Cardiovascular: Normal rate, regular rhythm, normal heart sounds, intact distal pulses and normal pulses.   The EKG shows a normal, regular Sinus Rhythm, at a rate of 84, there are not ST Changes. There is a previous EKG for comparison.    Pulmonary/Chest: Effort normal and breath sounds normal. She has no wheezes.   Abdominal: Soft. Normal appearance and bowel sounds are normal. She exhibits no abdominal bruit, no pulsatile midline mass and no mass. There is no rigidity, no rebound, no guarding and no CVA tenderness.   Musculoskeletal: She exhibits no deformity.        Cervical back: She exhibits tenderness and pain. She exhibits normal range of motion, no swelling, no spasm and normal pulse.        Thoracic back: She exhibits tenderness and pain. She exhibits normal range of motion, no swelling, no edema and normal pulse.        Lumbar back: She exhibits tenderness and pain. She exhibits normal range of motion, no swelling, no edema, no spasm and normal pulse.        Back:         Hands:  Strength intact and symmetrical throughout extremities.   Positive SLR on left   Neurological: She is alert and oriented to person, place, and time. She has normal strength and normal reflexes. No sensory deficit. She  exhibits normal muscle tone. Coordination normal.   Grossly intact. No cranial nerve deficits on exam.    Skin: Skin is warm, dry, intact and not diaphoretic.   Psychiatric: She has a normal mood and affect. Her speech is normal and behavior is normal. Judgment and thought content normal. Cognition and memory are normal.   Nursing note and vitals reviewed.    Results for orders placed or performed in visit on 06/09/20   POCT Urinalysis, Dipstick, Automated, W/O Scope   Result Value Ref Range    POC Blood, Urine Negative Negative    POC Bilirubin, Urine Positive (A) Negative    POC Urobilinogen, Urine normal 0.1 - 1.1    POC Ketones, Urine Negative Negative    POC Protein, Urine Positive (A) Negative    POC Nitrates, Urine Negative Negative    POC Glucose, Urine Negative Negative    pH, UA 5 5 - 8    POC Specific Gravity, Urine 1.025 1.003 - 1.029    POC Leukocytes, Urine Negative Negative     *Note: Due to a large number of results and/or encounters for the requested time period, some results have not been displayed. A complete set of results can be found in Results Review.     Will send urine culture. Patient requesting stronger medication than robaxin. Will give flexeril. Advised to keep appointment with PCP as currently scheduled for tomorrow. Advised on return/follow-up precautions. Advised on ER precautions. Answered all patient questions. Patient verbalized understanding and voiced agreement with current treatment plan.        Assessment:       1. Neck pain on left side    2. Acute left flank pain    3. Pain in left arm    4. Chest pain, unspecified type    5. Sciatica, left side        Plan:         Neck pain on left side  -     cyclobenzaprine (FLEXERIL) 5 MG tablet; Take 1 tablet (5 mg total) by mouth 3 (three) times daily as needed for Muscle spasms. MAY CAUSE DROWSINESS  Dispense: 9 tablet; Refill: 0    Acute left flank pain  -     POCT Urinalysis, Dipstick, Automated, W/O Scope  -     Urine  culture    Pain in left arm    Chest pain, unspecified type  -     Cancel: EKG 12-lead; Future  -     EKG 12-lead    Sciatica, left side      Patient Instructions     Alternate heat and ice for first 48 hours then  apply heat. You may do gently stretching if tolerable.    Moist warm compresss to area several times daily.  May use a heating pad on LOW to provide heat over a towel which was dampended with warm water. DO NOT FALL ASLEEP WITH HEATING PAD ON.  Do not stay in one position to long.  When sleeping on your back place a pillow under knees to reduce tension on back.  If sleeping on your side, place pillow between knees to keep spine in better alinement.  Wear supportive shoes such as tennis shoes for support of the lower back.       Continue taking tylenol arthritis as directed as needed for your neck pain.  STOP ROBAXIN. You can take the Muscle Relaxant (Flexeril) prescribed today as directed instead.   The medication you have been prescribed may cause drowsiness and impair your judgement.  Therefore, you should avoid driving, climbing, using machinery, etc., so as not to increase your risk of injury.  Do NOT drink any alcohol while on this medication(s).      If you had cultures done it will take 3-5 days to result. We will call you with the result.     Take antibiotics as we have discussed for your finger infection.     Follow up with your PCP tomorrow as currently scheduled as we have discussed.    If you lose control of your bowel and/or bladder, please go to the nearest Emergency Department immediately.  If you lose sensation in between your legs by your genitalia and/or rectum, please go to the nearest Emergency Department immediately.  If you lose control or sensation of any extremity, please go to the nearest Emergency Department immediately.    For worsening chest pain, shortness of breathing, vomiting, nausea, fever, etc. Go to the ER immediately for further evaluation and management.      If your  condition worsens or fails to improve we recommend that you receive another evaluation at the ER immediately or contact your PCP to discuss your concerns or return here. You must understand that you've received an urgent care treatment only and that you may be released before all your medical problems are known or treated. You the patient will arrange for followup care as instructed.           Neck Pain    There are several possible causes of neck pain when there is no injury:  · You can get a minor ligament sprain or muscle strain from a sudden minor neck movement. Sleeping with your neck in an awkward position can also cause this.  · Some people respond to emotional stress by tensing the muscles of their neck, shoulders, and upper back. Chronic spasm in these muscles can cause neck pain and sometimes headaches.  · Gradual wear and tear of the joints in the spine can cause degenerative arthritis. This can be a source of occasional or chronic neck pain.  · The spinal disks may bulge and put pressure on a nearby spinal nerve. This can happen as a natural result of aging or repeated small injuries to the neck. The spinal disks are the cushions between each spinal bone. This causes tingling, pain, or numbness that spreads from the neck to the shoulder, arm, or hand on one side.  Acute neck pain usually gets better in 1 to 2 weeks. Neck pain related to disk disease, arthritis in the spinal joints, or spinal stenosis can become chronic and last for months or years. Spinal stenosis is narrowing of the spinal canal.  X-rays are usually not ordered for the initial evaluation of neck pain. However, X-rays may be done if you had a forceful physical injury, such as a car accident or fall. If pain continues and doesnt respond to medical treatment, X-rays and other tests may be done at a later time.  Home care  · Rest and relax the muscles. Use a comfortable pillow that supports the head. It should also help keep the spine in a  neutral position. The position of the head should not be tilted forward or backward. A rolled up towel may help for a custom fit.  · Some people find relief with heat. Heat can be applied with either a warm shower or bath or a moist towel heated in the microwave and massage. Others prefer cold packs. You can make an ice pack by filling a plastic bag that seals at the top with ice cubes or crushed ice and then wrapping it with a thin towel. Try both and use the method that feels best for 15 to 20 minutes, several times a day.  · Whether using ice or heat, be careful that you do not injure your skin. Never put ice directly on the skin. Always wrap the ice in a towel or other type of cloth.This is very important, especially in people with poor skin sensations.   · Try to reduce your stress level. Emotional stress can lead to neck muscle tension and get in the way of or delay the healing process.  · You may use over-the-counter pain medicine to control pain, unless another medicine was prescribed. If you have chronic liver or kidney disease or ever had a stomach ulcer or GI bleeding, talk with your healthcare provider before using these medicines.  Follow-up care  Follow up with your healthcare provider if your symptoms do not show signs of improvement after one week. Physical therapy or further tests may be needed.  If X-rays, CT scans, or MRI scans were taken, you will be told of any new findings that may affect your care.  Call 911  Call 911 if you have:  · Sudden weakness or numbness in one or both arms  · Neck swelling, difficulty or painful swallowing  · Difficulty breathing  · Chest pain  When to seek medical advice  Call your healthcare provider right away if any of these occur:  · Pain becomes worse or spreads into one or both arm  · Increasing headache  · Fever of 100.4°F (38°C) or above lasting for 24 to 48 hours  Date Last Reviewed: 7/1/2016  © 3936-0175 [employer]. [address] [place] [address],  [place] [address]. All rights reserved. This information is not intended as a substitute for professional medical care. Always follow your healthcare professional's instructions.        Paronychia of the Finger or Toe  Paronychia is an infection near a fingernail or toenail. It usually occurs when an opening in the cuticle or an ingrown toenail lets bacteria under the skin.  The infection will need to be drained if pus is present. If the infection has been caught early, you may need only antibiotic treatment. Healing will take about 1 to 2 weeks.  Home care  Follow these guidelines when caring for yourself at home:  · Clean and soak the toe or finger. Do this 2 times a day for the first 3 days. To do so:  ¨ Soak your foot or hand in a tub of warm water for 5 minutes. Or hold your toe or finger under a faucet of warm running water for 5 minutes.  ¨ Clean any crust away with soap and water using a cotton swab.  ¨ Put antibiotic ointment on the infected area.  · Change the dressing daily or any time it gets dirty.  · If you were given antibiotics, take them as directed until they are all gone.  · If your infection is on a toe, wear comfortable shoes with a lot of toe room. You can also wear open-toed sandals while your toe heals.  · You may use over-the-counter medicine (acetaminophen or ibuprofen to help with pain, unless another medicine was prescribed. If you have chronic liver or kidney disease, talk with your healthcare provider before using these medicines. Also talk with your provider if you've had a stomach ulcer or GI (gastrointestinal) bleeding.  Prevention  The following can prevent paronychia:  · Avoid cutting or playing with your cuticles at home.  · Don't bite your nails.  · Don't suck on your thumbs or fingers.  Follow-up care  Follow up with your healthcare provider, or as advised.  When to seek medical advice  Call your healthcare provider right away if any of these occur:  · Redness, pain, or swelling  of the finger or toe gets worse  · Red streaks in the skin leading away from the wound  · Pus or fluid draining from the nail area  · Fever of 100.4ºF (38ºC) or higher, or as directed by your provider  Date Last Reviewed: 8/1/2016  © 1255-0769 [employer]. [address] [place] [id number]. All rights reserved. This information is not intended as a substitute for professional medical care. Always follow your healthcare professional's instructions.

## 2020-06-09 NOTE — PATIENT INSTRUCTIONS
Alternate heat and ice for first 48 hours then  apply heat. You may do gently stretching if tolerable.    Moist warm compresss to area several times daily.  May use a heating pad on LOW to provide heat over a towel which was dampended with warm water. DO NOT FALL ASLEEP WITH HEATING PAD ON.  Do not stay in one position to long.  When sleeping on your back place a pillow under knees to reduce tension on back.  If sleeping on your side, place pillow between knees to keep spine in better alinement.  Wear supportive shoes such as tennis shoes for support of the lower back.       Continue taking tylenol arthritis as directed as needed for your neck pain.  STOP ROBAXIN. You can take the Muscle Relaxant (Flexeril) prescribed today as directed instead.   The medication you have been prescribed may cause drowsiness and impair your judgement.  Therefore, you should avoid driving, climbing, using machinery, etc., so as not to increase your risk of injury.  Do NOT drink any alcohol while on this medication(s).      If you had cultures done it will take 3-5 days to result. We will call you with the result.     Take antibiotics as we have discussed for your finger infection.     Follow up with your PCP tomorrow as currently scheduled as we have discussed.    If you lose control of your bowel and/or bladder, please go to the nearest Emergency Department immediately.  If you lose sensation in between your legs by your genitalia and/or rectum, please go to the nearest Emergency Department immediately.  If you lose control or sensation of any extremity, please go to the nearest Emergency Department immediately.    For worsening chest pain, shortness of breathing, vomiting, nausea, fever, etc. Go to the ER immediately for further evaluation and management.      If your condition worsens or fails to improve we recommend that you receive another evaluation at the ER immediately or contact your PCP to discuss your concerns or return  here. You must understand that you've received an urgent care treatment only and that you may be released before all your medical problems are known or treated. You the patient will arrange for followup care as instructed.           Neck Pain    There are several possible causes of neck pain when there is no injury:  · You can get a minor ligament sprain or muscle strain from a sudden minor neck movement. Sleeping with your neck in an awkward position can also cause this.  · Some people respond to emotional stress by tensing the muscles of their neck, shoulders, and upper back. Chronic spasm in these muscles can cause neck pain and sometimes headaches.  · Gradual wear and tear of the joints in the spine can cause degenerative arthritis. This can be a source of occasional or chronic neck pain.  · The spinal disks may bulge and put pressure on a nearby spinal nerve. This can happen as a natural result of aging or repeated small injuries to the neck. The spinal disks are the cushions between each spinal bone. This causes tingling, pain, or numbness that spreads from the neck to the shoulder, arm, or hand on one side.  Acute neck pain usually gets better in 1 to 2 weeks. Neck pain related to disk disease, arthritis in the spinal joints, or spinal stenosis can become chronic and last for months or years. Spinal stenosis is narrowing of the spinal canal.  X-rays are usually not ordered for the initial evaluation of neck pain. However, X-rays may be done if you had a forceful physical injury, such as a car accident or fall. If pain continues and doesnt respond to medical treatment, X-rays and other tests may be done at a later time.  Home care  · Rest and relax the muscles. Use a comfortable pillow that supports the head. It should also help keep the spine in a neutral position. The position of the head should not be tilted forward or backward. A rolled up towel may help for a custom fit.  · Some people find relief  with heat. Heat can be applied with either a warm shower or bath or a moist towel heated in the microwave and massage. Others prefer cold packs. You can make an ice pack by filling a plastic bag that seals at the top with ice cubes or crushed ice and then wrapping it with a thin towel. Try both and use the method that feels best for 15 to 20 minutes, several times a day.  · Whether using ice or heat, be careful that you do not injure your skin. Never put ice directly on the skin. Always wrap the ice in a towel or other type of cloth.This is very important, especially in people with poor skin sensations.   · Try to reduce your stress level. Emotional stress can lead to neck muscle tension and get in the way of or delay the healing process.  · You may use over-the-counter pain medicine to control pain, unless another medicine was prescribed. If you have chronic liver or kidney disease or ever had a stomach ulcer or GI bleeding, talk with your healthcare provider before using these medicines.  Follow-up care  Follow up with your healthcare provider if your symptoms do not show signs of improvement after one week. Physical therapy or further tests may be needed.  If X-rays, CT scans, or MRI scans were taken, you will be told of any new findings that may affect your care.  Call 911  Call 911 if you have:  · Sudden weakness or numbness in one or both arms  · Neck swelling, difficulty or painful swallowing  · Difficulty breathing  · Chest pain  When to seek medical advice  Call your healthcare provider right away if any of these occur:  · Pain becomes worse or spreads into one or both arm  · Increasing headache  · Fever of 100.4°F (38°C) or above lasting for 24 to 48 hours  Date Last Reviewed: 7/1/2016  © 4947-1954 [employer]. [address] [place] [address], [place] [address]. All rights reserved. This information is not intended as a substitute for professional medical care. Always follow your healthcare  professional's instructions.        Paronychia of the Finger or Toe  Paronychia is an infection near a fingernail or toenail. It usually occurs when an opening in the cuticle or an ingrown toenail lets bacteria under the skin.  The infection will need to be drained if pus is present. If the infection has been caught early, you may need only antibiotic treatment. Healing will take about 1 to 2 weeks.  Home care  Follow these guidelines when caring for yourself at home:  · Clean and soak the toe or finger. Do this 2 times a day for the first 3 days. To do so:  ¨ Soak your foot or hand in a tub of warm water for 5 minutes. Or hold your toe or finger under a faucet of warm running water for 5 minutes.  ¨ Clean any crust away with soap and water using a cotton swab.  ¨ Put antibiotic ointment on the infected area.  · Change the dressing daily or any time it gets dirty.  · If you were given antibiotics, take them as directed until they are all gone.  · If your infection is on a toe, wear comfortable shoes with a lot of toe room. You can also wear open-toed sandals while your toe heals.  · You may use over-the-counter medicine (acetaminophen or ibuprofen to help with pain, unless another medicine was prescribed. If you have chronic liver or kidney disease, talk with your healthcare provider before using these medicines. Also talk with your provider if you've had a stomach ulcer or GI (gastrointestinal) bleeding.  Prevention  The following can prevent paronychia:  · Avoid cutting or playing with your cuticles at home.  · Don't bite your nails.  · Don't suck on your thumbs or fingers.  Follow-up care  Follow up with your healthcare provider, or as advised.  When to seek medical advice  Call your healthcare provider right away if any of these occur:  · Redness, pain, or swelling of the finger or toe gets worse  · Red streaks in the skin leading away from the wound  · Pus or fluid draining from the nail area  · Fever of  100.4ºF (38ºC) or higher, or as directed by your provider  Date Last Reviewed: 8/1/2016  © 8610-2386 [employer] [address] [place] [address], [place] [address]. All rights reserved. This information is not intended as a substitute for professional medical care. Always follow your healthcare professional's instructions.

## 2020-06-10 ENCOUNTER — TELEPHONE (OUTPATIENT)
Dept: FAMILY MEDICINE | Facility: CLINIC | Age: 50
End: 2020-06-10

## 2020-06-10 DIAGNOSIS — M54.2 NECK PAIN ON LEFT SIDE: ICD-10-CM

## 2020-06-10 RX ORDER — CYCLOBENZAPRINE HCL 5 MG
5 TABLET ORAL 3 TIMES DAILY PRN
Qty: 30 TABLET | Refills: 0 | Status: SHIPPED | OUTPATIENT
Start: 2020-06-10 | End: 2020-06-13

## 2020-06-10 NOTE — TELEPHONE ENCOUNTER
I called and left a message for the pt to call the office. Pt is requesting enough Flexeril until her appointment 06/16/2020. Please advise.

## 2020-06-10 NOTE — TELEPHONE ENCOUNTER
----- Message from Linda Golden sent at 6/10/2020  8:57 AM CDT -----  Contact: Pt  Pt went to  yesterday & was given cyclobenzaprine (FLEXERIL) 5 MG tablet. She states they only gave her 9 pills b/c she was scheduled to see PCP today & they wanted him to refill it. Her appt for today was rescheduled for next week & she wasn't aware. Please call [phone]

## 2020-06-12 ENCOUNTER — TELEPHONE (OUTPATIENT)
Dept: FAMILY MEDICINE | Facility: CLINIC | Age: 50
End: 2020-06-12

## 2020-06-12 NOTE — TELEPHONE ENCOUNTER
----- Message from Alena Plasencia sent at 6/12/2020 10:58 AM CDT -----  Type: Patient Call Back    Who called: Self     What is the request in detail:patient states she need something for back and left side pain. Please call     Can the clinic reply by [facility]? No     Would the patient rather a call back or a response via [facility]?  Call     Best call back number:[phone]      [facility] #[id number] [facility] [address] [facility] [address] [facility] [phone] (Phone)  [phone] (Fax)

## 2020-06-14 LAB
BACTERIA UR CULT: NORMAL
BACTERIA UR CULT: NORMAL

## 2020-06-16 ENCOUNTER — OFFICE VISIT (OUTPATIENT)
Dept: FAMILY MEDICINE | Facility: CLINIC | Age: 50
End: 2020-06-16
Payer: MEDICAID

## 2020-06-16 VITALS
WEIGHT: 235 LBS | SYSTOLIC BLOOD PRESSURE: 127 MMHG | DIASTOLIC BLOOD PRESSURE: 78 MMHG | HEART RATE: 81 BPM | HEIGHT: 66 IN | OXYGEN SATURATION: 97 % | TEMPERATURE: 98 F | RESPIRATION RATE: 18 BRPM | BODY MASS INDEX: 37.77 KG/M2

## 2020-06-16 DIAGNOSIS — E11.42 DIABETIC POLYNEUROPATHY ASSOCIATED WITH TYPE 2 DIABETES MELLITUS: ICD-10-CM

## 2020-06-16 DIAGNOSIS — M54.42 ACUTE LEFT-SIDED LOW BACK PAIN WITH LEFT-SIDED SCIATICA: Primary | ICD-10-CM

## 2020-06-16 PROCEDURE — 99214 PR OFFICE/OUTPT VISIT, EST, LEVL IV, 30-39 MIN: ICD-10-PCS | Mod: S$PBB,,, | Performed by: INTERNAL MEDICINE

## 2020-06-16 PROCEDURE — 99999 PR PBB SHADOW E&M-EST. PATIENT-LVL III: ICD-10-PCS | Mod: PBBFAC,,, | Performed by: INTERNAL MEDICINE

## 2020-06-16 PROCEDURE — 99999 PR PBB SHADOW E&M-EST. PATIENT-LVL III: CPT | Mod: PBBFAC,,, | Performed by: INTERNAL MEDICINE

## 2020-06-16 PROCEDURE — 99213 OFFICE O/P EST LOW 20 MIN: CPT | Mod: PBBFAC,PN | Performed by: INTERNAL MEDICINE

## 2020-06-16 PROCEDURE — 99214 OFFICE O/P EST MOD 30 MIN: CPT | Mod: S$PBB,,, | Performed by: INTERNAL MEDICINE

## 2020-06-16 RX ORDER — CYCLOBENZAPRINE HCL 5 MG
5 TABLET ORAL 3 TIMES DAILY PRN
Qty: 60 TABLET | Refills: 0 | Status: SHIPPED | OUTPATIENT
Start: 2020-06-16 | End: 2020-08-10

## 2020-06-16 RX ORDER — TRIAMCINOLONE ACETONIDE 40 MG/ML
40 INJECTION, SUSPENSION INTRA-ARTICULAR; INTRAMUSCULAR
Status: COMPLETED | OUTPATIENT
Start: 2020-06-16 | End: 2020-06-16

## 2020-06-16 RX ORDER — METHYLPREDNISOLONE 4 MG/1
TABLET ORAL
Qty: 1 PACKAGE | Refills: 0 | Status: SHIPPED | OUTPATIENT
Start: 2020-06-16 | End: 2020-09-09 | Stop reason: CLARIF

## 2020-06-16 RX ORDER — LIDOCAINE 30 MG/G
CREAM TOPICAL
Qty: 1 TUBE | Refills: 0 | Status: SHIPPED | OUTPATIENT
Start: 2020-06-16 | End: 2020-10-15

## 2020-06-16 RX ADMIN — TRIAMCINOLONE ACETONIDE 40 MG: 40 INJECTION, SUSPENSION INTRA-ARTICULAR; INTRAMUSCULAR at 09:06

## 2020-06-16 NOTE — PROGRESS NOTES
Subjective:       Patient ID: Audrey Natarajan is a 49 y.o. female.    Chief Complaint: Establish Care, Follow-up, and Back Pain (2 weeks now / lower back)    Back Pain  This is a recurrent problem. The current episode started 1 to 4 weeks ago (two weeks ago). The problem occurs constantly. The problem is unchanged. The pain is present in the lumbar spine. The quality of the pain is described as cramping and shooting. The pain does not radiate. The pain is severe. The pain is the same all the time. The symptoms are aggravated by bending, twisting and position. Pertinent negatives include no abdominal pain, bladder incontinence, bowel incontinence, chest pain, dysuria, fever, headaches, pelvic pain, perianal numbness or weakness. Risk factors include obesity, poor posture and sedentary lifestyle. She has tried heat, muscle relaxant and NSAIDs for the symptoms. The treatment provided mild relief.   seen in urgent care one week ago for same pain urine culture NGTD. Found cyclobenzaprine more helpful for pain management then methocarbemol. Topical heat is also helpful. No nausea/vomitting no dyspnea.     Regarding diabetes not checking blood glucose but denies symptoms of hypoglycemia. No falls still with parathesia along left leg recently seen by vascular surgery did not feel EVLT would be of benefit. She is wearing tubigrip now for compression because unable to bend forward to pull up compression stocking  Review of Systems   Constitutional: Negative for activity change, appetite change, fatigue, fever and unexpected weight change.   HENT: Negative for ear pain, rhinorrhea and sore throat.    Eyes: Negative for discharge and visual disturbance.   Respiratory: Negative for chest tightness, shortness of breath and wheezing.    Cardiovascular: Negative for chest pain, palpitations and leg swelling.   Gastrointestinal: Negative for abdominal pain, bowel incontinence, constipation and diarrhea.   Endocrine: Negative  "for cold intolerance and heat intolerance.   Genitourinary: Negative for bladder incontinence, dysuria, hematuria and pelvic pain.   Musculoskeletal: Positive for back pain. Negative for joint swelling and neck stiffness.   Skin: Negative for rash.   Neurological: Negative for dizziness, syncope, weakness and headaches.   Psychiatric/Behavioral: Negative for suicidal ideas.       Objective:     Vitals:    06/16/20 0909   BP: 127/78   BP Location: Right arm   Patient Position: Sitting   BP Method: Medium (Automatic)   Pulse: 81   Resp: 18   Temp: 98.4 °F (36.9 °C)   TempSrc: Oral   SpO2: 97%   Weight: 106.6 kg (235 lb 0.2 oz)   Height: 5' 6" (1.676 m)          Physical Exam  Constitutional:       Appearance: She is well-developed.   HENT:      Head: Normocephalic and atraumatic.   Eyes:      General: No scleral icterus.     Conjunctiva/sclera: Conjunctivae normal.   Neck:      Musculoskeletal: Normal range of motion.   Cardiovascular:      Rate and Rhythm: Normal rate and regular rhythm.      Heart sounds: No murmur. No friction rub. No gallop.    Pulmonary:      Effort: Pulmonary effort is normal.      Breath sounds: Normal breath sounds. No wheezing or rales.   Abdominal:      Palpations: Abdomen is soft.      Tenderness: There is no abdominal tenderness. There is no guarding or rebound.   Musculoskeletal: Normal range of motion.         General: Tenderness present.      Comments: Tenderness of left lumbar paraspinous from L2-L5 no overlying skin changes no spinous process tenderness   Skin:     General: Skin is warm and dry.   Neurological:      Mental Status: She is alert and oriented to person, place, and time.      Cranial Nerves: No cranial nerve deficit.      Comments: Normal gait observed (stiff upper trunk) 5/5 dorsi/plantar flexion         Assessment and Plan   1. Acute left-sided low back pain with left-sided sciatica  IM steroid now oral steroid burst x five days no NSAIDs while taking steroid, topical " lidocaine   - lidocaine 3 % Crea; Rub on lower back for pain as needed no more than 3 times a day  Dispense: 1 Tube; Refill: 0  - triamcinolone acetonide injection 40 mg  - methylPREDNISolone (MEDROL DOSEPACK) 4 mg tablet; use as directed  Dispense: 1 Package; Refill: 0  - cyclobenzaprine (FLEXERIL) 5 MG tablet; Take 1 tablet (5 mg total) by mouth 3 (three) times daily as needed for Muscle spasms.  Dispense: 60 tablet; Refill: 0    2. Diabetic polyneuropathy associated with type 2 diabetes mellitus  Labs for quality fo control today

## 2020-06-17 ENCOUNTER — TELEPHONE (OUTPATIENT)
Dept: URGENT CARE | Facility: CLINIC | Age: 50
End: 2020-06-17

## 2020-06-23 ENCOUNTER — OFFICE VISIT (OUTPATIENT)
Dept: URGENT CARE | Facility: CLINIC | Age: 50
End: 2020-06-23
Payer: MEDICAID

## 2020-06-23 ENCOUNTER — OFFICE VISIT (OUTPATIENT)
Dept: PODIATRY | Facility: CLINIC | Age: 50
End: 2020-06-23
Payer: MEDICAID

## 2020-06-23 VITALS
HEART RATE: 87 BPM | DIASTOLIC BLOOD PRESSURE: 70 MMHG | SYSTOLIC BLOOD PRESSURE: 124 MMHG | WEIGHT: 221.81 LBS | BODY MASS INDEX: 35.65 KG/M2 | HEIGHT: 66 IN

## 2020-06-23 VITALS
WEIGHT: 235 LBS | HEART RATE: 88 BPM | HEIGHT: 66 IN | SYSTOLIC BLOOD PRESSURE: 137 MMHG | RESPIRATION RATE: 17 BRPM | DIASTOLIC BLOOD PRESSURE: 77 MMHG | OXYGEN SATURATION: 95 % | TEMPERATURE: 99 F | BODY MASS INDEX: 37.77 KG/M2

## 2020-06-23 DIAGNOSIS — M20.41 HAMMER TOES OF BOTH FEET: ICD-10-CM

## 2020-06-23 DIAGNOSIS — E11.49 TYPE II DIABETES MELLITUS WITH NEUROLOGICAL MANIFESTATIONS: Primary | ICD-10-CM

## 2020-06-23 DIAGNOSIS — M20.5X1 HALLUX LIMITUS, ACQUIRED, RIGHT: ICD-10-CM

## 2020-06-23 DIAGNOSIS — M20.5X2 HALLUX LIMITUS, ACQUIRED, LEFT: ICD-10-CM

## 2020-06-23 DIAGNOSIS — M20.42 HAMMER TOES OF BOTH FEET: ICD-10-CM

## 2020-06-23 DIAGNOSIS — B35.1 ONYCHOMYCOSIS OF RIGHT GREAT TOE: ICD-10-CM
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
## 2020-06-23 DIAGNOSIS — M20.12 HALLUX ABDUCTO VALGUS, LEFT: ICD-10-CM

## 2020-06-23 PROCEDURE — 99999 PR PBB SHADOW E&M-EST. PATIENT-LVL V: ICD-10-PCS | Mod: PBBFAC,,, | Performed by: PODIATRIST

## 2020-06-23 PROCEDURE — 99213 OFFICE O/P EST LOW 20 MIN: CPT | Mod: S$GLB,,, | Performed by: PHYSICIAN ASSISTANT

## 2020-06-23 PROCEDURE — 99999 PR PBB SHADOW E&M-EST. PATIENT-LVL V: CPT | Mod: PBBFAC,,, | Performed by: PODIATRIST

## 2020-06-23 PROCEDURE — 72040 X-RAY EXAM NECK SPINE 2-3 VW: CPT | Mod: S$GLB,,, | Performed by: RADIOLOGY

## 2020-06-23 PROCEDURE — 99214 OFFICE O/P EST MOD 30 MIN: CPT | Mod: S$PBB,,, | Performed by: PODIATRIST

## 2020-06-23 PROCEDURE — 99215 OFFICE O/P EST HI 40 MIN: CPT | Mod: PBBFAC,PO | Performed by: PODIATRIST

## 2020-06-23 PROCEDURE — 99213 PR OFFICE/OUTPT VISIT, EST, LEVL III, 20-29 MIN: ICD-10-PCS | Mod: S$GLB,,, | Performed by: PHYSICIAN ASSISTANT

## 2020-06-23 PROCEDURE — 72040 XR CERVICAL SPINE 2 OR 3 VIEWS: ICD-10-PCS | Mod: S$GLB,,, | Performed by: RADIOLOGY

## 2020-06-23 PROCEDURE — 99214 PR OFFICE/OUTPT VISIT, EST, LEVL IV, 30-39 MIN: ICD-10-PCS | Mod: S$PBB,,, | Performed by: PODIATRIST

## 2020-06-23 PROCEDURE — 72070 X-RAY EXAM THORAC SPINE 2VWS: CPT | Mod: S$GLB,,, | Performed by: RADIOLOGY

## 2020-06-23 PROCEDURE — 72070 XR THORACIC SPINE AP LATERAL: ICD-10-PCS | Mod: S$GLB,,, | Performed by: RADIOLOGY

## 2020-06-23 RX ORDER — MOMETASONE FUROATE AND FORMOTEROL FUMARATE DIHYDRATE 100; 5 UG/1; UG/1
AEROSOL RESPIRATORY (INHALATION)
COMMUNITY
Start: 2020-06-15 | End: 2020-06-25

## 2020-06-23 NOTE — PROGRESS NOTES
"Subjective:       Patient ID: Audrey Natarajan is a 49 y.o. female.    Vitals:  height is 5' 6" (1.676 m) and weight is 106.6 kg (235 lb). Her temperature is 98.5 °F (36.9 °C). Her blood pressure is 137/77 and her pulse is 88. Her respiration is 17 and oxygen saturation is 95%.     Chief Complaint: Pain    Pt states Friday night she fell outside onto the ground at home and hurt her tail bone up to the right side of her neck. She took some flexeril and finished her steroid pack on the 16th of this month with no relief.     Pain  This is a new problem. The current episode started in the past 7 days. The problem occurs constantly. The problem has been unchanged. Associated symptoms include neck pain. Pertinent negatives include no abdominal pain, chills, coughing, fever, headaches, nausea or vomiting. Treatments tried: flexeril.       Constitution: Negative for chills and fever.   Neck: Positive for neck pain.   Respiratory: Negative for cough.    Gastrointestinal: Negative for abdominal pain, nausea and vomiting.   Musculoskeletal: Positive for pain and back pain.   Neurological: Negative for dizziness, light-headedness and headaches.   Psychiatric/Behavioral: Negative for confusion.       Objective:      Physical Exam   Constitutional: She is oriented to person, place, and time.  Non-toxic appearance. She does not appear ill. No distress.   Uncomfortable-appearing, nontoxic.   HENT:   Head: Normocephalic and atraumatic.   Neck: Neck supple.   TTP r sided cervical, trapezius ttp. There is midline cervical spine ttp   Pulmonary/Chest: Effort normal and breath sounds normal. No stridor. No respiratory distress. She has no wheezes. She has no rhonchi. She exhibits no tenderness.   Musculoskeletal: Normal range of motion.      Comments: Midline upper thoracic spine ttp. There is generalized R sided muscular back ttp.   Neurological: She is alert and oriented to person, place, and time.   Skin: Skin is warm and not " diaphoretic.   Psychiatric: Her behavior is normal. Mood, judgment and thought content normal.   Nursing note and vitals reviewed.        Assessment:       1. Fall, initial encounter        Plan:         Mechanical fall 5 days ago, R sided neck and back pain since that time. Only mild relief with Flexeril. No radiculopathy or paresthesia. No acute fracture; straightening of cervical lordosis.     Continue supportive measures.     Fall, initial encounter  -     X-Ray Cervical Spine 2 or 3 Views; Future; Expected date: 06/23/2020  -     X-Ray Thoracic Spine AP Lateral; Future; Expected date: 06/23/2020

## 2020-06-23 NOTE — PATIENT INSTRUCTIONS
Continue with Flexeril as needed for stiffness/soreness.    Please present to the emergency department if you begin with shortness of breath or chest pain, if you begin with leg swelling, if you begin with fast or irregular heartbeat, if pain worsens despite treatment.  Understanding Cervical Strain    There are 7 bones (vertebrae) in the neck that are part of the spine. These are called the cervical spine. Cervical strain is a medical term for neck pain. The neck has several layers of muscles. These are connected with tendons to the cervical spine and other bones. Neck pain is often the result of injury to these muscles and tendons.  Causes of cervical strain  Different types of stress on the neck can damage muscles and tendons (soft tissues) and cause cervical strain. Cervical tissues can be damaged by:  · The neck being forced past its normal range of motion, such as in a car accident or sports injury  · Constant, low-level stress, such as from poor posture or a poorly set-up workspace  Symptoms of cervical strain  These may include:  · Neck pain or stiffness  · Pain in the shoulders or upper back  · Muscle spasms  · Headache, often starting at the base of the neck  · Irritability, difficulty concentrating, or sleeplessness  Treatment for cervical strain  This problem often gets better on its own. Treatments aim to reduce pain and inflammation and increase the range of motion of the neck. Possible treatments include:  · Over-the-counter or prescription pain medicine. These help relieve pain and inflammation.  · Stretching exercises to decrease neck stiffness.  · Massage to decrease neck stiffness.  · Cold or heat pack. These help reduce pain and swelling.  Call 911  Call emergency services right away if you have any of these:  · Face drooping or numbness  · Numbness or weakness, especially in the arms or on one side  · Slurred speech or difficulty speaking  · Blurred vision   When to call your healthcare  Returned the patient's call and there was no answer. provider  Call your healthcare provider right away if you have any of these:  · Fever of 100.4°F (38°C) or higher, or as directed  · Pain or stiffness that gets worse  · Symptoms that dont get better, or get worse  · Numbness, tingling, weakness or shooting pains into the arms or legs  · New symptoms  Date Last Reviewed: 3/10/2016  © 7104-3843 [employer]. [address] [place] [address], [place] [address]. All rights reserved. This information is not intended as a substitute for professional medical care. Always follow your healthcare professional's instructions.

## 2020-06-23 NOTE — PROGRESS NOTES
rSubjective:      Patient ID: Audrey Natarajan is a 49 y.o. female.    Chief Complaint: Diabetes Mellitus (ov 6/16/20 Pena pcp), Ankle Pain (bilateral inner ankle/ left 10, right 7), and Nail Care      Audrey Natarajan is a 49 y.o. female who presents to the podiatry clinic  with complaint of left shin and central metatarsal pain.  Description: moderate Nature: aching, throbbing and bruising Onset of the symptoms was several months ago. Precipitating event: increased activity while on vacation.  History of injury: no Current symptoms include: worsening symptoms after a period of activity. Aggravating factors: standing and walking. Alleviating factors: rest. Symptoms have progressed to a point and plateaued. Patient has had prior foot problems. Evaluation to date: none. Treatment to date: none. Patients rates pain 6/10 on pain scale.      09/30/2019 patient presents to clinic for follow-up of persistent right foot and leg pain.  She relates that she discontinue use of Cam boot several days ago with instruction her primary care physician.  Patient states that she may have re-injured the foot secondary to a misstep a few days ago. She continues to have pain to the forefoot and lateral foot and ankle as well as the anterior leg.  Patient relates that she has done some icing.  She relates that she has not been taking any anti-inflammatories consistently.  Patient relates that she uses a lidocaine cream for pain which is somewhat useful.  Patient denies nausea, vomiting, fever, chills    12/05/2019 She has been wearing compression stockings with ankle brace and relates significant overall improvement.    02/06/2020  She has been wearing compression stockings with ankle brace and relates improvement but pain and swelling persists.    06/23/2020 The patient's chief complaint is elongated, thickened toenails aggravated by increased weight bearing, shoe gear, pressure. she is a diabetes and therefore has an  increased risk of ulceration/amputation requiring routine evaluation/management/optomization of feet.  Patient also complaining about multiple areas of pain.  She relates a fall approximately 1 week ago.  She states that she will be long to Urgent Care following our encounter.        PCP: Donaldo Pena MD    Date Last Seen by PCP:   Chief Complaint   Patient presents with    Diabetes Mellitus     ov 6/16/20 [facility]y pcp    Ankle Pain     bilateral inner ankle/ left 10, right 7    Nail Care       Hemoglobin A1C   Date Value Ref Range Status   06/16/2020 7.6 (H) 4.0 - 5.6 % Final     Comment:     ADA Screening Guidelines:  5.7-6.4%  Consistent with prediabetes  >or=6.5%  Consistent with diabetes  High levels of fetal hemoglobin interfere with the HbA1C  assay. Heterozygous hemoglobin variants (HbS, HgC, etc)do  not significantly interfere with this assay.   However, presence of multiple variants may affect accuracy.     02/28/2020 7.7 (H) 4.0 - 5.6 % Final     Comment:     ADA Screening Guidelines:  5.7-6.4%  Consistent with prediabetes  >or=6.5%  Consistent with diabetes  High levels of fetal hemoglobin interfere with the HbA1C  assay. Heterozygous hemoglobin variants (HbS, HgC, etc)do  not significantly interfere with this assay.   However, presence of multiple variants may affect accuracy.     10/21/2019 6.7 (H) 4.0 - 5.6 % Final     Comment:     ADA Screening Guidelines:  5.7-6.4%  Consistent with prediabetes  >or=6.5%  Consistent with diabetes  High levels of fetal hemoglobin interfere with the HbA1C  assay. Heterozygous hemoglobin variants (HbS, HgC, etc)do  not significantly interfere with this assay.   However, presence of multiple variants may affect accuracy.             Patient Active Problem List   Diagnosis    Essential hypertension    COPD (chronic obstructive pulmonary disease)    Hypertriglyceridemia    Tobacco abuse    Mild protein malnutrition    Diabetic neuropathy    Leg swelling     Incisional hernia without mention of obstruction or gangrene    DM type 2 without retinopathy    History of DVT (deep vein thrombosis)    History of pulmonary embolus (PE)    Bipolar 1 disorder    Gastroparesis due to DM    Thrombocytosis    Leukocytosis    Morbid obesity    Type 2 diabetes mellitus    Acne    Other chronic pain    Vomiting and diarrhea    Nuclear sclerosis of both eyes    Bilateral ocular hypertension    Refractive error    Long term (current) use of anticoagulants    Hypercoagulable state    History of pulmonary embolism    DVT, recurrent, lower extremity, chronic, left    Decreased ROM of ankle    Decreased strength of lower extremity    Balance problem    Gait abnormality       Current Outpatient Medications on File Prior to Visit   Medication Sig Dispense Refill    acetaminophen (ARTHRITIS PAIN RELIEVER) 650 MG TbSR Take 650 mg by mouth. Pt states taking 2 -3 times a day      albuterol (ACCUNEB) 0.63 mg/3 mL Nebu Take 3 mLs (0.63 mg total) by nebulization every 8 (eight) hours as needed (wheezing). Rescue 1 Box 1    albuterol (PROVENTIL/VENTOLIN HFA) 90 mcg/actuation inhaler INHALE 2 PUFFS BY MOUTH INTO THE LUNGS EVERY 6 HOURS AS NEEDED FOR WHEEZING. RESCUE 18 g 0    aspirin (ECOTRIN) 81 MG EC tablet Take 81 mg by mouth once daily.       azelastine (ASTELIN) 137 mcg (0.1 %) nasal spray 1 spray (137 mcg total) by Nasal route 2 (two) times daily. 30 mL 0    b complex vitamins tablet Take 1 tablet by mouth once daily. 90 tablet 2    carbamazepine (TEGRETOL) 200 mg tablet TK 2 TS PO BID  1    ciclopirox (LOPROX) 0.77 % Susp Apply topically once daily. 30 mL 3    clotrimazole (LOTRIMIN) 1 % cream Apply topically 2 (two) times daily. 28 g 1    cyanocobalamin (VITAMIN B-12) 100 MCG tablet Take 1 tablet (100 mcg total) by mouth once daily. 90 tablet 1    cyclobenzaprine (FLEXERIL) 5 MG tablet Take 1 tablet (5 mg total) by mouth 3 (three) times daily as needed for  Muscle spasms. 60 tablet 0    diphenhydrAMINE (BENADRYL) 50 MG capsule Take 1 capsule (50 mg total) by mouth every 6 (six) hours as needed for Itching or Allergies (Swelling of the throat, rash and shortness of breath). 20 capsule 0    DUPIXENT 300 mg/2 mL Syrg inject 300mg SUBCUTANEOUSLY every other week  6    famotidine (PEPCID) 20 MG tablet Take 1 tablet (20 mg total) by mouth 2 (two) times daily. 10 tablet 0    fish oil-omega-3 fatty acids 300-1,000 mg capsule Take 2 capsules by mouth once daily. Instructed to stop 5-7 days before surgery (8/11/16). 60 capsule 5    fluticasone propionate (FLONASE) 50 mcg/actuation nasal spray SHAKE LIQUID AND USE 1 SPRAY(50 MCG) IN EACH NOSTRIL TWICE DAILY 16 g 3    furosemide (LASIX) 20 MG tablet TAKE 1 TABLET(20 MG) BY MOUTH EVERY DAY 30 tablet 2    gabapentin (NEURONTIN) 600 MG tablet TAKE 1 TABLET(600 MG) BY MOUTH TWICE DAILY 180 tablet 2    hydrOXYzine pamoate (VISTARIL) 25 MG Cap       ibuprofen (ADVIL,MOTRIN) 600 MG tablet TAKE 1 TABLET(600 MG) BY MOUTH EVERY 8 HOURS AS NEEDED FOR PAIN 60 tablet 1    lancets Misc To check BG once daily, to use with insurance preferred meter 30 each 2    lidocaine 3 % Crea Rub on lower back for pain as needed no more than 3 times a day 1 Tube 0    lisinopriL (PRINIVIL,ZESTRIL) 5 MG tablet TAKE 1 TABLET(5 MG) BY MOUTH EVERY DAY 90 tablet 2    loratadine (CLARITIN) 10 mg tablet Take 1 tablet (10 mg total) by mouth once daily. 30 tablet 5    meloxicam (MOBIC) 15 MG tablet TAKE 1 TABLET(15 MG) BY MOUTH EVERY DAY 30 tablet 2    metFORMIN (GLUCOPHAGE) 1000 MG tablet Take 1 tablet (1,000 mg total) by mouth 2 (two) times daily with meals. 180 tablet 2    methylPREDNISolone (MEDROL DOSEPACK) 4 mg tablet use as directed 1 Package 0    metoprolol tartrate (LOPRESSOR) 50 MG tablet Take 1 tablet (50 mg total) by mouth 2 (two) times daily. 60 tablet 2    mometasone-formoterol (DULERA) 200-5 mcg/actuation inhaler Inhale 1 puff into the  "lungs 2 (two) times daily. 8.8 g 1    multivitamin (THERAGRAN) per tablet Take 1 tablet by mouth every morning. 90 tablet 2    nicotine polacrilex 2 MG Lozg 1-2 per hour in place of cigarettes maximum of 20 per day. 168 lozenge 0    nystatin (NYSTOP) powder Apply topically 2 (two) times daily. Apply to axilla and breast folds 60 g 2    ondansetron (ZOFRAN) 4 MG tablet Take 1 tablet (4 mg total) by mouth every 8 (eight) hours as needed for Nausea. 30 tablet 0    pantoprazole (PROTONIX) 40 MG tablet TAKE 1 TABLET(40 MG) BY MOUTH EVERY DAY 30 tablet 5    pravastatin (PRAVACHOL) 40 MG tablet TAKE 1 TABLET(40 MG) BY MOUTH EVERY DAY 90 tablet 2    risperidone (RISPERDAL) 3 MG Tab take at bedtime  1    SITagliptin (JANUVIA) 100 MG Tab Take 1 tablet (100 mg total) by mouth once daily. 90 tablet 0    VYVANSE 50 mg capsule TK ONE C PO QAM  0    [DISCONTINUED] ciclopirox-nail lacquer removr 8 % Kit Apply 1 application topically once a week. 1 kit 4    blood-glucose meter kit To check BG once daily, to use with insurance preferred meter 1 each 0     No current facility-administered medications on file prior to visit.        Review of patient's allergies indicates:   Allergen Reactions    Morphine Other (See Comments)     Patient had a psychotic episode after taking Morphine  Agitation, hallucinations    Penicillins Anaphylaxis     itching       Past Surgical History:   Procedure Laterality Date    ABDOMINAL SURGERY      BILATERAL OOPHORECTOMY Bilateral 1/12/2015    Green' s filter Right 7/4/2012    Right Neck & Tunneled Down.    HERNIA REPAIR      "[place] [facility] around th [place].", pt. states    OOPHORECTOMY      OVARIAN CYST REMOVAL  3/13/2014    LA REMOVAL OF OVARY/TUBE(S)      SPLENECTOMY, TOTAL  July 2003    TONSILLECTOMY      as a child    TYMPANOSTOMY TUBE PLACEMENT  1976    VEIN SURGERY  2003    Lt leg       Family History   Problem Relation Age of Onset    Hypertension Father  "    Diabetes Father     Heart disease Father     Cataracts Father     Diabetes Paternal Grandfather     Heart disease Paternal Grandfather     No Known Problems Mother     Ovarian cancer Maternal Grandmother          from this. ? age     No Known Problems Sister     No Known Problems Brother     No Known Problems Maternal Aunt     No Known Problems Maternal Uncle     No Known Problems Paternal Aunt     No Known Problems Paternal Uncle     No Known Problems Maternal Grandfather     Ovarian cancer Paternal Grandmother     Uterine cancer Neg Hx     Breast cancer Neg Hx     Colon cancer Neg Hx     Amblyopia Neg Hx     Blindness Neg Hx     Cancer Neg Hx     Glaucoma Neg Hx     Macular degeneration Neg Hx     Retinal detachment Neg Hx     Strabismus Neg Hx     Stroke Neg Hx     Thyroid disease Neg Hx        Social History     Socioeconomic History    Marital status:      Spouse name: Not on file    Number of children: Not on file    Years of education: Not on file    Highest education level: Not on file   Occupational History    Not on file   Social Needs    Financial resource strain: Not on file    Food insecurity     Worry: Not on file     Inability: Not on file    Transportation needs     Medical: Not on file     Non-medical: Not on file   Tobacco Use    Smoking status: Current Every Day Smoker     Packs/day: 0.50     Years: 25.00     Pack years: 12.50     Types: Cigarettes    Smokeless tobacco: Never Used   Substance and Sexual Activity    Alcohol use: No     Alcohol/week: 0.0 standard drinks    Drug use: No    Sexual activity: Not Currently   Lifestyle    Physical activity     Days per week: Not on file     Minutes per session: Not on file    Stress: Not on file   Relationships    Social connections     Talks on phone: Not on file     Gets together: Not on file     Attends [employer] service: Not on file     Active member of club or organization: Not on file      "Attends meetings of clubs or organizations: Not on file     Relationship status: Not on file   Other Topics Concern    Not on file   Social History Narrative    Not on file       Review of Systems   Constitution: Negative for chills and fever.   Cardiovascular: Positive for leg swelling. Negative for claudication.   Respiratory: Negative for cough and shortness of breath.    Skin: Positive for dry skin and nail changes. Negative for itching and rash.   Musculoskeletal: Positive for arthritis, back pain, falls, joint pain and myalgias. Negative for joint swelling and muscle weakness.   Gastrointestinal: Negative for diarrhea, nausea and vomiting.   Neurological: Positive for numbness and paresthesias. Negative for tremors and weakness.   Psychiatric/Behavioral: Negative for altered mental status and hallucinations.           Objective:      Vitals:    06/23/20 1042   BP: 124/70   Pulse: 87   Weight: 100.6 kg (221 lb 12.5 oz)   Height: 5' 6" (1.676 m)   PainSc: 10-Worst pain ever       Physical Exam  Nursing note reviewed.   Constitutional:       General: She is not in acute distress.     Appearance: She is not toxic-appearing or diaphoretic.   Cardiovascular:      Pulses:           Dorsalis pedis pulses are 2+ on the right side and 2+ on the left side.        Posterior tibial pulses are 2+ on the right side and 2+ on the left side.   Pulmonary:      Effort: No respiratory distress.   Musculoskeletal:      Right ankle: She exhibits decreased range of motion and swelling. No tenderness. No lateral malleolus, no medial malleolus, no AITFL, no CF ligament and no posterior TFL tenderness found. Achilles tendon exhibits no pain, no defect and normal Paniagua's test results.      Left ankle: She exhibits decreased range of motion and swelling. Tenderness (anterior shin pain). No lateral malleolus, no medial malleolus, no AITFL, no CF ligament, no posterior TFL and no proximal fibula tenderness found. Achilles tendon " exhibits no pain, no defect and normal Paniagua's test results.      Right foot: No bony tenderness.      Left foot: Swelling present.      Comments: Biomechanical exam: There is equinus deformity bilateral with decreased dorsiflexion at the ankle joint bilateral. No tenderness with compression of heel. Negative tinels sign. Gait analysis reveals excessive pronation through midstance and propulsion with early heel off. Shoes reveals lateral heel counter wear bilateral     Decreased first MPJ range of motion both weightbearing and nonweightbearing, no crepitus observed the first MP joint, + dorsal flag sign. Mild  bunion deformity is observed .    Patient has hammertoes of digits 2-5 bilateral partially reducible without symptom today.     Skin:     General: Skin is warm and dry.      Coloration: Skin is not pale.      Findings: No bruising, burn, laceration, lesion or rash.      Nails: There is no clubbing.        Comments: Examination of the skin reveals no evidence of significant rashes, open lesions, suspicious appearing nevi or other concerning lesions.     Toenails 1-5 bilaterally are thickened by 2-3 mm, discolored/yellowed, dystrophic, brittle with subungual debris. Tender to distal nail plate pressure, without periungual skin abnormality of each.       Neurological:      Sensory: No sensory deficit.      Motor: No tremor, atrophy or abnormal muscle tone.      Deep Tendon Reflexes: Reflexes are normal and symmetric.      Comments: Paresthesias, and hyperesthesia bilateral feet at toes with no clearly identified trigger or source.    [place]-Gilberto 5.07 monofilament is intact bilateral feet. Sharp/dull sensation is also intact Bilateral feet.   Psychiatric:         Attention and Perception: She is attentive.         Mood and Affect: Mood is not anxious. Affect is not inappropriate.         Speech: She is communicative. Speech is not slurred.         Behavior: Behavior is not combative.                Assessment:       Encounter Diagnoses   Name Primary?    Type II diabetes mellitus with neurological manifestations Yes    Hallux limitus, acquired, right     Hallux limitus, acquired, left     Hammer toes of both feet     Hallux abducto valgus, left     Onychomycosis of right great toe          Plan:       Audrey was seen today for diabetes mellitus, ankle pain and nail care.    Diagnoses and all orders for this visit:    Type II diabetes mellitus with neurological manifestations    Hallux limitus, acquired, right    Hallux limitus, acquired, left    Hammer toes of both feet    Hallux abducto valgus, left    Onychomycosis of right great toe  -     ciclopirox-nail lacquer removr 8 % Kit; Apply 1 application topically once a week.      I counseled the patient on her conditions, their implications and medical management.    Orders Placed This Encounter    ciclopirox-nail lacquer removr 8 % Kit     Greater than 50% of this visit spent on counseling and coordination of care.    Education about the diabetic foot, neuropathy, and prevention of limb loss.    Shoe inspection. Diabetic Foot Education. Patient reminded of the importance of good nutrition/healthy diet/weight management and blood sugar control to help prevent podiatric complications of diabetes. Patient instructed on proper foot hygeine. Wear comfortable, proper fitting shoes. Wash feet daily. Dry well. After drying, apply moisturizer to feet (no lotion to webspaces). Inspect feet daily for skin breaks, blisters, swelling, or redness. Wear cotton socks (preferably white)  Change socks every day. Do NOT walk barefoot. Do NOT use heating pads or hot water soaks. We discussed wearing proper shoe gear, daily foot inspections, never walking without protective shoe gear.     Discussed wearing appropriate shoe gear and avoiding flats, slippers, sandals, and going barefoot. My recommendation for OTC supports is [facility] [employer].  Advised patient on wearing  compression wraps, tights during activity. Advised patient to warm up before and after exercise or increased activity. Gradually return to exercise level; careful not to overtrain.    Discussed all treatment options such as topical, oral, laser treatments vs surgical removal of nail permanent vs non-permanent in detail pertaining to nail fungus. Instructed patient on the importance of keeping fungus off of skin while treating nails. Patient instructed to use absorbent cotton socks and change them if they become sweaty; or wear an open-toe shoe or sandal. Wash the feet at least once a day with soap and water. Patient wants to try topical. Rx penlac Instructed patient that it takes time for symptoms to completely dissipate. Patient instructed to use lysol or over-the-counter antifungal powders or sprays to shoes daily and allow them to air dry, switching shoes from every other day would be optimal. Patient is to avoid barefoot walking in  high-risk environments (public showers, gyms and locker rooms) may prevent future infections.     She will continue to monitor the areas daily, inspect his feet, wear protective shoe gear when ambulatory, moisturizer to maintain skin integrity and follow in this office in approximately 6 months, sooner p.r.n.

## 2020-06-25 ENCOUNTER — TELEPHONE (OUTPATIENT)
Dept: PODIATRY | Facility: CLINIC | Age: 50
End: 2020-06-25

## 2020-06-25 ENCOUNTER — CLINICAL SUPPORT (OUTPATIENT)
Dept: REHABILITATION | Facility: HOSPITAL | Age: 50
End: 2020-06-25
Attending: INTERNAL MEDICINE
Payer: MEDICAID

## 2020-06-25 DIAGNOSIS — M25.672 DECREASED RANGE OF MOTION OF LEFT ANKLE: ICD-10-CM

## 2020-06-25 DIAGNOSIS — R29.898 DECREASED STRENGTH OF LOWER EXTREMITY: ICD-10-CM

## 2020-06-25 DIAGNOSIS — R26.89 BALANCE PROBLEM: ICD-10-CM

## 2020-06-25 DIAGNOSIS — R26.9 GAIT ABNORMALITY: ICD-10-CM

## 2020-06-25 PROCEDURE — 97110 THERAPEUTIC EXERCISES: CPT | Mod: PN

## 2020-06-25 NOTE — PROGRESS NOTES
Physical Therapy Daily Treatment Note     Name: Audrey Natarajan  Clinic Number: [id number]    Therapy Diagnosis:   Encounter Diagnoses   Name Primary?    Decreased range of motion of left ankle     Decreased strength of lower extremity     Balance problem     Gait abnormality      Physician: Donaldo Pena MD    Visit Date: 6/25/2020     Physician: Donaldo Pena MD     Physician Orders: PT Eval and Treat   Medical Diagnosis from Referral: M25.572,G89.29 (ICD-10-CM) - Chronic pain of left ankle  Evaluation Date: 6/1/2020  Authorization Period Expiration: 9/30/2020  Plan of Care Expiration: 9/1/2020  Visit # / Visits authorized: 1/ 24 PN Due: 7/1/2020     Time In: 5:00 pm  Time Out:5:53 pm  Total Billable Time:53 minutes     Precautions: T2DM, COPD    Subjective     Pt reports: that she had fall last week. She fell down on both knees. She states she cont with pain in the L ankle and lower leg. Leg cont to swollen up at times.   She was not compliant with home exercise program.  Response to previous treatment: No  Functional change: No change     Pain: 7/10  Location: left ankle, lower leg     Objective     Audrey received therapeutic exercises to develop strength, endurance, ROM and flexibility for 53  minutes including:    Nustep 5 min  Quad set 3x10  SLR 2x10   +Ankle Pumps 3x10  +Ankle ABCs x 2 trials  +Gastroc Stretch 3x30  Ankle 4-ways Y-tband   Seated ankle DF/PF 3x10   Standing calf stretch 3x30 sec     SLS on ground EO 3x30  Tandem stance on ground EO 3x30  Tandem stance on foam EC 3x30  Tandem walk  3 laps pole to pole     Audrey received the following manual therapy techniques: Soft tissue Mobilization were applied to the: N/a for 00 minutes, including:    Audrey participated in neuromuscular re-education activities to improve: Balance and Proprioception for  minutes. The following activities were included:      Audrey received cold pack for 00 minutes to N/a.      Home Exercises Provided  and Patient Education Provided     Education provided:   Cont to perform HEP as provided.     Written Home Exercises Provided: Patient instructed to cont prior HEP.  Exercises were reviewed and Audrey was able to demonstrate them prior to the end of the session.  Audrey demonstrated good  understanding of the education provided.     See EMR under Patient Instructions for exercises provided prior visit.    Assessment     Pt tolerated PT session well today. Pt cont with issue of L ankle pain and swollen. Pt was able to tolerated ankle AROM in all direction w/ minor pain. Muscle strengthening of quad, hip and gastroc was tolerated well with generalized weakness. She presented with increase of calf tightness. She presented with weakness of B ankle muscle weakness causing decrease in ankle proprioception. She lost balance several times during tandem stance EC. Plan to cont work on ankle muscle strength, AROM, and decrease pain.     Audrey is progressing well towards her goals.   Pt prognosis is Good.     Pt will continue to benefit from skilled outpatient physical therapy to address the deficits listed in the problem list box on initial evaluation, provide pt/family education and to maximize pt's level of independence in the home and community environment.     Pt's spiritual, cultural and educational needs considered and pt agreeable to plan of care and goals.    Anticipated barriers to physical therapy: chronicity of pain, compliance with therapy  Goals:  GOALS: Short Term Goals:  4 weeks  1.Report decreased  in  pain at worse less than  <   / =  7/10  to increase tolerance for improved functional actvities  2. Pt to improve range of motion by 25% to allow for improved functional mobility to allow for improvement in IADLs.   3. Increased L ankle MMT 1/2 grade  to increase tolerance for ADL and work activities.  4. Pt to demonstrate ability to walk x 50 ft with no boot and pain < / = 5/10 in order to return to daily  activities.   5. Pt to tolerate HEP to improve ROM and independence with ADL's     Long Term Goals: 8 weeks  1.Report decreased  in  pain at worse  less than  <   / =  3  /10  to increase tolerance for improved functional actvities  2.Pt to improve range of motion by 75% to allow for improved functional mobility to allow for improvement in IADLs.   3.Increased L ankle MMT 1 grade  to increase tolerance for ADL and work activities.  4. Pt will score > / =  45% on  FOTO outcome assessment  to increase functional activities and mobility   5. Pt to be Independent with HEP to improve ROM and independence with ADL's  6. Pt to demonstrate ability to walk x 100 ft without boot and pain < / = 2/10 in order to return to daily activities.        Plan     Plan of care Certification: 6/1/2020 to 9/1/20.    Cont skilled PT session towards PT and patient's goals.    Mayur He, PT   06/25/2020

## 2020-06-30 ENCOUNTER — CLINICAL SUPPORT (OUTPATIENT)
Dept: REHABILITATION | Facility: HOSPITAL | Age: 50
End: 2020-06-30
Attending: INTERNAL MEDICINE
Payer: MEDICAID

## 2020-06-30 DIAGNOSIS — R29.898 DECREASED STRENGTH OF LOWER EXTREMITY: ICD-10-CM

## 2020-06-30 DIAGNOSIS — R26.89 BALANCE PROBLEM: ICD-10-CM

## 2020-06-30 DIAGNOSIS — M25.673 DECREASED RANGE OF MOTION OF ANKLE, UNSPECIFIED LATERALITY: Primary | ICD-10-CM

## 2020-06-30 DIAGNOSIS — R26.9 GAIT ABNORMALITY: ICD-10-CM

## 2020-06-30 PROCEDURE — 97110 THERAPEUTIC EXERCISES: CPT | Mod: PN

## 2020-06-30 NOTE — PROGRESS NOTES
Physical Therapy Daily Treatment Note     Name: Audrey Natarajan  Clinic Number: [id number]    Therapy Diagnosis:   Encounter Diagnoses   Name Primary?    Decreased range of motion of ankle, unspecified laterality Yes    Decreased strength of lower extremity     Balance problem     Gait abnormality      Physician: Donaldo Pena MD    Visit Date: 6/30/2020     Physician: Donaldo Pena MD     Physician Orders: PT Eval and Treat   Medical Diagnosis from Referral: M25.572,G89.29 (ICD-10-CM) - Chronic pain of left ankle  Evaluation Date: 6/1/2020  Authorization Period Expiration: 9/30/2020  Plan of Care Expiration: 9/1/2020  Visit # / Visits authorized: 2/ 24 PN Due: 7/1/2020     Time In: 5:10 pm  Time Out: 5:50 pm  Total Billable Time: 40 minutes     Precautions: T2DM, COPD    Subjective     Pt reports: Her right side is hurting today secondary to fall last week. States that her left ankle is feeling ok today.     She was not compliant with home exercise program.  Response to previous treatment: No  Functional change: No change     Pain: 6/10  Location: left ankle, lower leg     Objective     Audrey received therapeutic exercises to develop strength, endurance, ROM and flexibility for 40  minutes including:    Nustep 5 min  Quad set 3x10  SLR 2x10   Ankle Pumps 3x10  Ankle ABCs x 2 trials  Gastroc Stretch on wedge 3x30  Ankle 4-ways Y-tband   Seated ankle DF/PF 3x10   Standing calf stretch 3x30 sec     SLS on ground EO 3x30  Tandem stance on ground EO 3x30  Tandem stance on foam EC 3x30  Tandem walk  3 laps pole to pole     Audrey received the following manual therapy techniques: Soft tissue Mobilization were applied to the: N/a for 00 minutes, including:    Audrey participated in neuromuscular re-education activities to improve: Balance and Proprioception for  minutes. The following activities were included:      Audrey received cold pack for 00 minutes to N/a.      Home Exercises Provided and  Patient Education Provided     Education provided:   Cont to perform HEP as provided.     Written Home Exercises Provided: Patient instructed to cont prior HEP.  Exercises were reviewed and Audrey was able to demonstrate them prior to the end of the session.  Audrey demonstrated good  understanding of the education provided.     See EMR under Patient Instructions for exercises provided prior visit.    Assessment     Pt tolerated PT session well today overall with no adverse response. Pt requiring vc to decrease compensatory motion at L knee during ankle 4 way. Pt with most difficulty this session completing tandem standing with EC. Plan to continue progression of ankle strengthening, balance training, and weightbearing activities as tolerated. Pt remains appropriate for therapy.     Audrey is progressing well towards her goals.   Pt prognosis is Good.     Pt will continue to benefit from skilled outpatient physical therapy to address the deficits listed in the problem list box on initial evaluation, provide pt/family education and to maximize pt's level of independence in the home and community environment.     Pt's spiritual, cultural and educational needs considered and pt agreeable to plan of care and goals.    Anticipated barriers to physical therapy: chronicity of pain, compliance with therapy  Goals:  GOALS: Short Term Goals:  4 weeks  1.Report decreased  in  pain at worse less than  <   / =  7/10  to increase tolerance for improved functional actvities  2. Pt to improve range of motion by 25% to allow for improved functional mobility to allow for improvement in IADLs.   3. Increased L ankle MMT 1/2 grade  to increase tolerance for ADL and work activities.  4. Pt to demonstrate ability to walk x 50 ft with no boot and pain < / = 5/10 in order to return to daily activities.   5. Pt to tolerate HEP to improve ROM and independence with ADL's     Long Term Goals: 8 weeks  1.Report decreased  in  pain at worse   less than  <   / =  3  /10  to increase tolerance for improved functional actvities  2.Pt to improve range of motion by 75% to allow for improved functional mobility to allow for improvement in IADLs.   3.Increased L ankle MMT 1 grade  to increase tolerance for ADL and work activities.  4. Pt will score > / =  45% on  FOTO outcome assessment  to increase functional activities and mobility   5. Pt to be Independent with HEP to improve ROM and independence with ADL's  6. Pt to demonstrate ability to walk x 100 ft without boot and pain < / = 2/10 in order to return to daily activities.        Plan     Plan of care Certification: 6/1/2020 to 9/1/20.    Cont skilled PT session towards PT and patient's goals.    Anupama Augustin, PT   06/30/2020

## 2020-07-17 ENCOUNTER — OFFICE VISIT (OUTPATIENT)
Dept: FAMILY MEDICINE | Facility: CLINIC | Age: 50
End: 2020-07-17
Payer: MEDICAID

## 2020-07-17 VITALS
SYSTOLIC BLOOD PRESSURE: 137 MMHG | DIASTOLIC BLOOD PRESSURE: 61 MMHG | TEMPERATURE: 98 F | WEIGHT: 229.94 LBS | HEIGHT: 66 IN | BODY MASS INDEX: 36.95 KG/M2 | OXYGEN SATURATION: 97 % | HEART RATE: 99 BPM | RESPIRATION RATE: 17 BRPM

## 2020-07-17 DIAGNOSIS — H90.5 SENSORINEURAL HEARING LOSS (SNHL) OF LEFT EAR, UNSPECIFIED HEARING STATUS ON CONTRALATERAL SIDE: ICD-10-CM

## 2020-07-17 DIAGNOSIS — E11.42 TYPE 2 DIABETES MELLITUS WITH DIABETIC POLYNEUROPATHY, WITHOUT LONG-TERM CURRENT USE OF INSULIN: Primary | ICD-10-CM

## 2020-07-17 PROCEDURE — 99214 PR OFFICE/OUTPT VISIT, EST, LEVL IV, 30-39 MIN: ICD-10-PCS | Mod: S$PBB,,, | Performed by: INTERNAL MEDICINE

## 2020-07-17 PROCEDURE — 99215 OFFICE O/P EST HI 40 MIN: CPT | Mod: PBBFAC,PN | Performed by: INTERNAL MEDICINE

## 2020-07-17 PROCEDURE — 99999 PR PBB SHADOW E&M-EST. PATIENT-LVL V: CPT | Mod: PBBFAC,,, | Performed by: INTERNAL MEDICINE

## 2020-07-17 PROCEDURE — 99999 PR PBB SHADOW E&M-EST. PATIENT-LVL V: ICD-10-PCS | Mod: PBBFAC,,, | Performed by: INTERNAL MEDICINE

## 2020-07-17 PROCEDURE — 99214 OFFICE O/P EST MOD 30 MIN: CPT | Mod: S$PBB,,, | Performed by: INTERNAL MEDICINE

## 2020-07-17 NOTE — PROGRESS NOTES
"Subjective:       Patient ID: Audrey Natarajan is a 49 y.o. female.    Chief Complaint: Establish Care and Follow-up    F/u back pain and diabetes    HPI: 50 y/o w/ DM morbid obesity chronic back pain for follow up. Mid thoracic back pain has resolved has had two sessions with PT for lower back feels exercises help no further steroids. Not longer using flexeril. No loss of bowel or bladder. Recent a1c was above goal (had three rounds of systemic steroids in two months prior to this) she is attempting to make dietary changes but financial difficulties make it difficult for her to follow a diabetic die (eating rices/breads because these are more affordable)    Review of Systems   Constitutional: Negative for activity change, appetite change, fatigue, fever and unexpected weight change.   HENT: Negative for ear pain, rhinorrhea and sore throat.    Eyes: Negative for discharge and visual disturbance.   Respiratory: Negative for chest tightness, shortness of breath and wheezing.    Cardiovascular: Negative for chest pain, palpitations and leg swelling.   Gastrointestinal: Negative for abdominal pain, constipation and diarrhea.   Endocrine: Negative for cold intolerance and heat intolerance.   Genitourinary: Negative for dysuria and hematuria.   Musculoskeletal: Positive for back pain. Negative for joint swelling and neck stiffness.   Skin: Negative for rash.   Neurological: Negative for dizziness, syncope, weakness and headaches.   Psychiatric/Behavioral: Negative for suicidal ideas.       Objective:     Vitals:    07/17/20 1103   BP: 137/61   BP Location: Right arm   Patient Position: Sitting   BP Method: Medium (Automatic)   Pulse: 99   Resp: 17   Temp: 98.4 °F (36.9 °C)   TempSrc: Oral   SpO2: 97%   Weight: 104.3 kg (229 lb 15 oz)   Height: 5' 6" (1.676 m)          Physical Exam  Constitutional:       General: She is not in acute distress.     Appearance: She is well-developed. She is obese.   HENT:      Head: " Normocephalic and atraumatic.   Neck:      Musculoskeletal: Normal range of motion.   Cardiovascular:      Rate and Rhythm: Normal rate and regular rhythm.      Heart sounds: No murmur. No friction rub. No gallop.    Pulmonary:      Effort: Pulmonary effort is normal.      Breath sounds: Normal breath sounds. No wheezing or rales.   Abdominal:      General: Bowel sounds are normal.      Palpations: Abdomen is soft.      Tenderness: There is no abdominal tenderness. There is no guarding or rebound.   Musculoskeletal: Normal range of motion.         General: No tenderness.      Right lower leg: No edema.      Left lower leg: No edema.   Skin:     General: Skin is warm and dry.   Neurological:      Mental Status: She is alert and oriented to person, place, and time.      Cranial Nerves: No cranial nerve deficit.   Psychiatric:         Mood and Affect: Mood normal.         Assessment and Plan   1. Type 2 diabetes mellitus with diabetic polyneuropathy, without long-term current use of insulin  Above goal but with recent steroid use will focus on dietary changes and increasing physical activity discussed trying brown rices and whole wheat breads rather than white rice and bread   - CBC auto differential; Future  - Comprehensive metabolic panel; Future  - Hemoglobin A1C; Future    2. Sensorineural hearing loss (SNHL) of left ear, unspecified hearing status on contralateral side  She reports long history of difficulty of hearing on left side no ear pain or discharge ENT eval w/ audiometric exam  - Ambulatory referral/consult to ENT; Future

## 2020-07-23 ENCOUNTER — CLINICAL SUPPORT (OUTPATIENT)
Dept: REHABILITATION | Facility: HOSPITAL | Age: 50
End: 2020-07-23
Attending: INTERNAL MEDICINE
Payer: MEDICAID

## 2020-07-23 DIAGNOSIS — R26.89 BALANCE PROBLEM: ICD-10-CM

## 2020-07-23 DIAGNOSIS — M25.673 DECREASED RANGE OF MOTION OF ANKLE, UNSPECIFIED LATERALITY: Primary | ICD-10-CM

## 2020-07-23 DIAGNOSIS — R29.898 DECREASED STRENGTH OF LOWER EXTREMITY: ICD-10-CM

## 2020-07-23 DIAGNOSIS — R26.9 GAIT ABNORMALITY: ICD-10-CM

## 2020-07-23 PROCEDURE — 97110 THERAPEUTIC EXERCISES: CPT | Mod: PN

## 2020-07-23 NOTE — PROGRESS NOTES
Physical Therapy Daily Treatment Note     Name: Audrey Natarajan  Clinic Number: [id number]    Therapy Diagnosis:   Encounter Diagnoses   Name Primary?    Decreased range of motion of ankle, unspecified laterality Yes    Decreased strength of lower extremity     Balance problem     Gait abnormality      Physician: Donaldo Pena MD    Visit Date: 7/23/2020     Physician: Donaldo Pena MD     Physician Orders: PT Eval and Treat   Medical Diagnosis from Referral: M25.572,G89.29 (ICD-10-CM) - Chronic pain of left ankle  Evaluation Date: 6/1/2020  Authorization Period Expiration: 9/30/2020  Plan of Care Expiration: 9/1/2020  Visit # / Visits authorized: 4/ 24                                             PN Due: 7/1/2020     Time In: 5:00 pm  Time Out: 5:50 pm  Total Billable Time:50  minutes     Precautions: T2DM, COPD    Subjective     Pt reports: She had a fall on Monday and is having increased pain in her back. States that she is unable to lay flat on mat today secondary to pain in back after fall.      She was not compliant with home exercise program.  Response to previous treatment: No  Functional change: No change     Pain: 6/10  Location: left ankle, lower leg     Objective     Ankle Range of Motion:   Ankle Right Left   Dorsiflexion 10/20 2/10   Plantarflexion WFL 30   Inversion 30 30   Eversion 30 20      Strength:   Right Ankle    Left Ankle     Dorsiflexion: 5/5 Dorsiflexion: 4/5   Plantarflexion:  5/5 Plantarflexion: 3+/5   Inversion: 5/5 Inversion: 4-/5    Eversion: 5/5 Eversion: 4-/5         Right LE   Left LE     Hip flexion: 5/5 Hip flexion: 4-/5   Hip extension:  3+/5 Hip extension: 3+/5   Hip abduction:  seated 4/5 Hip abduction:  seated 4/5   Hip adduction:  seated 4/5 Hip adduction  seated 4/5   Knee extension: 5/5 Knee extension: 5/5   Knee flexion:  seated 4/5 Knee flexion:  seated 4/5       Audrey received therapeutic exercises to develop strength, endurance, ROM and flexibility  for 50  minutes including:    Nustep 5 min  Quad set 3x10  SLR 2x10   Ankle Pumps 3x10  Ankle ABCs x 2 trials  Gastroc Stretch on wedge 3x30  Ankle 4-ways Y-tband   Seated ankle DF/PF 3x10     SLS on ground EO 3x30  Tandem stance on ground EO 3x30  Tandem stance on foam EC 3x30  Tandem walk  3 laps pole to pole     Audrey received the following manual therapy techniques: Soft tissue Mobilization were applied to the: N/a for 00 minutes, including:    Audrey participated in neuromuscular re-education activities to improve: Balance and Proprioception for  minutes. The following activities were included:      Audrey received cold pack for 00 minutes to N/a.      Home Exercises Provided and Patient Education Provided     Education provided:   Cont to perform HEP as provided.     Written Home Exercises Provided: Patient instructed to cont prior HEP.  Exercises were reviewed and Audrey was able to demonstrate them prior to the end of the session.  Audrey demonstrated good  understanding of the education provided.     See EMR under Patient Instructions for exercises provided prior visit.    Assessment     Pt tolerated PT session well today overall with no adverse response. Pt progress note performed this session. Pt demonstrating some improvements in L ankle strength this session, particularly in L ankle eversion/inversion. However pt with little crossover into pain reduction and functional mobility at this time. Pt progress limited at this time secondary to poor attendance. Pt tolerated all exercises today with moderate difficulty and appropriate level of muscular fatigue. Pt continues with most difficulty completing heel raise on L LE. Pt would benefit from continued skilled therapy in order to address decreased B LE strength, gait abnormalities, decreased tolerance to activities, and poor L ankle ROM/flexibiliy.      Audrey is progressing well towards her goals.   Pt prognosis is Good.     Pt will continue to benefit  from skilled outpatient physical therapy to address the deficits listed in the problem list box on initial evaluation, provide pt/family education and to maximize pt's level of independence in the home and community environment.     Pt's spiritual, cultural and educational needs considered and pt agreeable to plan of care and goals.    Anticipated barriers to physical therapy: chronicity of pain, compliance with therapy  Goals:  GOALS: Short Term Goals:  4 weeks  1.Report decreased  in  pain at worse less than  <   / =  7/10  to increase tolerance for improved functional actvities  2. Pt to improve range of motion by 25% to allow for improved functional mobility to allow for improvement in IADLs.   3. Increased L ankle MMT 1/2 grade  to increase tolerance for ADL and work activities.  4. Pt to demonstrate ability to walk x 50 ft with no boot and pain < / = 5/10 in order to return to daily activities.   5. Pt to tolerate HEP to improve ROM and independence with ADL's     Long Term Goals: 8 weeks  1.Report decreased  in  pain at worse  less than  <   / =  3  /10  to increase tolerance for improved functional actvities  2.Pt to improve range of motion by 75% to allow for improved functional mobility to allow for improvement in IADLs.   3.Increased L ankle MMT 1 grade  to increase tolerance for ADL and work activities.  4. Pt will score > / =  45% on  FOTO outcome assessment  to increase functional activities and mobility   5. Pt to be Independent with HEP to improve ROM and independence with ADL's  6. Pt to demonstrate ability to walk x 100 ft without boot and pain < / = 2/10 in order to return to daily activities.        Plan     Plan of care Certification: 6/1/2020 to 9/1/20.    Cont skilled PT session towards PT and patient's goals.    Anupama Augustin, PT   07/23/2020

## 2020-07-27 ENCOUNTER — CLINICAL SUPPORT (OUTPATIENT)
Dept: REHABILITATION | Facility: HOSPITAL | Age: 50
End: 2020-07-27
Attending: INTERNAL MEDICINE
Payer: MEDICAID

## 2020-07-27 DIAGNOSIS — R26.89 BALANCE PROBLEM: ICD-10-CM

## 2020-07-27 DIAGNOSIS — R26.9 GAIT ABNORMALITY: ICD-10-CM

## 2020-07-27 DIAGNOSIS — R29.898 DECREASED STRENGTH OF LOWER EXTREMITY: ICD-10-CM

## 2020-07-27 DIAGNOSIS — M25.672 DECREASED RANGE OF MOTION OF LEFT ANKLE: ICD-10-CM

## 2020-07-27 PROCEDURE — 97110 THERAPEUTIC EXERCISES: CPT | Mod: PN,CQ

## 2020-07-27 NOTE — PROGRESS NOTES
Physical Therapy Daily Treatment Note     Name: Audrey Natarajan  Clinic Number: [id number]    Therapy Diagnosis:   Encounter Diagnoses   Name Primary?    Decreased range of motion of left ankle     Decreased strength of lower extremity     Balance problem     Gait abnormality      Physician: Donaldo Pena MD    Visit Date: 7/27/2020     Physician: Donaldo Pena MD     Physician Orders: PT Eval and Treat   Medical Diagnosis from Referral: M25.572,G89.29 (ICD-10-CM) - Chronic pain of left ankle  Evaluation Date: 6/1/2020  Authorization Period Expiration: 9/30/2020  Plan of Care Expiration: 9/1/2020  Visit # / Visits authorized: 4/ 24                                             PN Due: 8/23/2020     Time In: 5:05 pm  Time Out: 5:45 pm  Total Billable Time: 40  minutes     Precautions: T2DM, COPD    Subjective     Pt reports: no recent falls. She is feeling about the same. Her foot/ankle is still swelling. She was on her feet a lot today.    She was not compliant with home exercise program.  Response to previous treatment: No  Functional change: No change     Pain: 6/10  Location: left ankle, lower leg     Objective         Audrey received therapeutic exercises to develop strength, endurance, ROM and flexibility for 50  minutes including:    Nustep 5 min  Quad set 3x10  SLR 2x10   Ankle Pumps 3x10  +ankle circles CW/CCW x 20  Ankle ABCs x 2 trials  Gastroc Stretch on wedge 3x30  +standing calf raises 2 x 10  Ankle 4-ways Y-tband   Seated ankle DF/PF 3x10     SLS on ground EO 3x30  Tandem stance on ground EO 3x30  Tandem stance on foam EC 3x30  Tandem walk  3 laps pole to pole     Audrey received the following manual therapy techniques: Soft tissue Mobilization were applied to the: N/a for 00 minutes, including:    Audrey participated in neuromuscular re-education activities to improve: Balance and Proprioception for  minutes. The following activities were included:      Audrey received cold pack for  00 minutes to N/a.      Home Exercises Provided and Patient Education Provided     Education provided:   Cont to perform HEP as provided.     Written Home Exercises Provided: Patient instructed to cont prior HEP.  Exercises were reviewed and Audrey was able to demonstrate them prior to the end of the session.  Audrey demonstrated good  understanding of the education provided.     See EMR under Patient Instructions for exercises provided prior visit.    Assessment     Pt tolerated PT session well today. She enters clinic with continued pain and swelling to L ankle. No adverse reaction with today's session. She was progressed with tandem standing to tandem standing on foam. Notable ankle instability and moderate postural swaying. She was encouraged to elevate feet and perform frequent ROM exercises while elevated (AP's) to help reduce swelling.   Pt would benefit from continued skilled therapy in order to address decreased B LE strength, gait abnormalities, decreased tolerance to activities, and poor L ankle ROM/flexibiliy.      Audrey is progressing well towards her goals.   Pt prognosis is Good.     Pt will continue to benefit from skilled outpatient physical therapy to address the deficits listed in the problem list box on initial evaluation, provide pt/family education and to maximize pt's level of independence in the home and community environment.     Pt's spiritual, cultural and educational needs considered and pt agreeable to plan of care and goals.    Anticipated barriers to physical therapy: chronicity of pain, compliance with therapy  Goals:  GOALS: Short Term Goals:  4 weeks  1.Report decreased  in  pain at worse less than  <   / =  7/10  to increase tolerance for improved functional actvities  2. Pt to improve range of motion by 25% to allow for improved functional mobility to allow for improvement in IADLs.   3. Increased L ankle MMT 1/2 grade  to increase tolerance for ADL and work activities.  4. Pt  to demonstrate ability to walk x 50 ft with no boot and pain < / = 5/10 in order to return to daily activities.   5. Pt to tolerate HEP to improve ROM and independence with ADL's     Long Term Goals: 8 weeks  1.Report decreased  in  pain at worse  less than  <   / =  3  /10  to increase tolerance for improved functional actvities  2.Pt to improve range of motion by 75% to allow for improved functional mobility to allow for improvement in IADLs.   3.Increased L ankle MMT 1 grade  to increase tolerance for ADL and work activities.  4. Pt will score > / =  45% on  FOTO outcome assessment  to increase functional activities and mobility   5. Pt to be Independent with HEP to improve ROM and independence with ADL's  6. Pt to demonstrate ability to walk x 100 ft without boot and pain < / = 2/10 in order to return to daily activities.        Plan     Plan of care Certification: 6/1/2020 to 9/1/20.    Cont skilled PT session towards PT and patient's goals.    Elaine Oliveira, PTA   07/27/2020

## 2020-07-30 ENCOUNTER — CLINICAL SUPPORT (OUTPATIENT)
Dept: REHABILITATION | Facility: HOSPITAL | Age: 50
End: 2020-07-30
Attending: INTERNAL MEDICINE
Payer: MEDICAID

## 2020-07-30 DIAGNOSIS — R26.89 BALANCE PROBLEM: ICD-10-CM

## 2020-07-30 DIAGNOSIS — R29.898 DECREASED STRENGTH OF LOWER EXTREMITY: ICD-10-CM

## 2020-07-30 DIAGNOSIS — M25.673 DECREASED RANGE OF MOTION OF ANKLE, UNSPECIFIED LATERALITY: Primary | ICD-10-CM

## 2020-07-30 DIAGNOSIS — R26.9 GAIT ABNORMALITY: ICD-10-CM

## 2020-07-30 PROCEDURE — 97110 THERAPEUTIC EXERCISES: CPT | Mod: PN

## 2020-07-30 NOTE — PROGRESS NOTES
Physical Therapy Daily Treatment Note     Name: Audrey Natarajan  Clinic Number: [id number]    Therapy Diagnosis:   Encounter Diagnoses   Name Primary?    Decreased range of motion of ankle, unspecified laterality Yes    Decreased strength of lower extremity     Balance problem     Gait abnormality      Physician: Donaldo Pena MD    Visit Date: 7/30/2020     Physician: Donaldo Pena MD     Physician Orders: PT Eval and Treat   Medical Diagnosis from Referral: M25.572,G89.29 (ICD-10-CM) - Chronic pain of left ankle  Evaluation Date: 6/1/2020  Authorization Period Expiration: 9/30/2020  Plan of Care Expiration: 9/1/2020  Visit # / Visits authorized: 5/ 24                                             PN Due: 8/23/2020     Time In: 5:05 pm  Time Out: 5:45 pm  Total Billable Time: 40  minutes     Precautions: T2DM, COPD    Subjective     Pt reports: Her L ankle still will not fit into her regular shoes.     She was not compliant with home exercise program.  Response to previous treatment: No  Functional change: No change     Pain: 6/10  Location: left ankle, lower leg     Objective     Audrey received therapeutic exercises to develop strength, endurance, ROM and flexibility for 40  minutes including:    Nustep 5 min  Quad set 3x10  SLR 2x10   Ankle Pumps 3x10  ankle circles CW/CCW x 20  Ankle ABCs x 2 trials  Gastroc Stretch on wedge 3x30  Soleus stretch on wedge 3x30  standing calf raises 2 x 10  Ankle 4-ways Y-tband   Seated ankle DF/PF 3x10     SLS on ground EO 3x30  Tandem stance on ground EO 3x30  Tandem stance on foam EC 3x30  Tandem walk  3 laps pole to pole     Audrey received the following manual therapy techniques: Soft tissue Mobilization were applied to the: N/a for 00 minutes, including:    Audrey participated in neuromuscular re-education activities to improve: Balance and Proprioception for  minutes. The following activities were included:      Audrey received cold pack for 00 minutes to  N/a.      Home Exercises Provided and Patient Education Provided     Education provided:   Cont to perform HEP as provided.     Written Home Exercises Provided: Patient instructed to cont prior HEP.  Exercises were reviewed and Audrey was able to demonstrate them prior to the end of the session.  Audrey demonstrated good  understanding of the education provided.     See EMR under Patient Instructions for exercises provided prior visit.    Assessment     Pt tolerated PT session well today. She enters clinic with continued pain and swelling to L ankle. No adverse reaction with today's session. Pt with most difficulty today performing tandem stance with eyes closed. Plan to continue progression of ankle strengthening and balance challenges as appropriate. Pt remains appropriate for therapy at this time.     Pt would benefit from continued skilled therapy in order to address decreased B LE strength, gait abnormalities, decreased tolerance to activities, and poor L ankle ROM/flexibiliy.      Audrey is progressing well towards her goals.   Pt prognosis is Good.     Pt will continue to benefit from skilled outpatient physical therapy to address the deficits listed in the problem list box on initial evaluation, provide pt/family education and to maximize pt's level of independence in the home and community environment.     Pt's spiritual, cultural and educational needs considered and pt agreeable to plan of care and goals.    Anticipated barriers to physical therapy: chronicity of pain, compliance with therapy  Goals:  GOALS: Short Term Goals:  4 weeks  1.Report decreased  in  pain at worse less than  <   / =  7/10  to increase tolerance for improved functional actvities  2. Pt to improve range of motion by 25% to allow for improved functional mobility to allow for improvement in IADLs.   3. Increased L ankle MMT 1/2 grade  to increase tolerance for ADL and work activities.  4. Pt to demonstrate ability to walk x 50 ft  with no boot and pain < / = 5/10 in order to return to daily activities.   5. Pt to tolerate HEP to improve ROM and independence with ADL's     Long Term Goals: 8 weeks  1.Report decreased  in  pain at worse  less than  <   / =  3  /10  to increase tolerance for improved functional actvities  2.Pt to improve range of motion by 75% to allow for improved functional mobility to allow for improvement in IADLs.   3.Increased L ankle MMT 1 grade  to increase tolerance for ADL and work activities.  4. Pt will score > / =  45% on  FOTO outcome assessment  to increase functional activities and mobility   5. Pt to be Independent with HEP to improve ROM and independence with ADL's  6. Pt to demonstrate ability to walk x 100 ft without boot and pain < / = 2/10 in order to return to daily activities.        Plan     Plan of care Certification: 6/1/2020 to 9/1/20.    Cont skilled PT session towards PT and patient's goals.    Anupama Augustin, PT   07/31/2020

## 2020-08-08 DIAGNOSIS — G89.29 CHRONIC MIDLINE LOW BACK PAIN WITHOUT SCIATICA: ICD-10-CM

## 2020-08-08 DIAGNOSIS — M54.50 CHRONIC MIDLINE LOW BACK PAIN WITHOUT SCIATICA: ICD-10-CM

## 2020-08-08 DIAGNOSIS — M54.42 ACUTE LEFT-SIDED LOW BACK PAIN WITH LEFT-SIDED SCIATICA: ICD-10-CM

## 2020-08-10 ENCOUNTER — CLINICAL SUPPORT (OUTPATIENT)
Dept: REHABILITATION | Facility: HOSPITAL | Age: 50
End: 2020-08-10
Attending: INTERNAL MEDICINE
Payer: MEDICAID

## 2020-08-10 DIAGNOSIS — M25.672 DECREASED RANGE OF MOTION OF LEFT ANKLE: Primary | ICD-10-CM

## 2020-08-10 DIAGNOSIS — R29.898 DECREASED STRENGTH OF LOWER EXTREMITY: ICD-10-CM

## 2020-08-10 DIAGNOSIS — R26.89 BALANCE PROBLEM: ICD-10-CM

## 2020-08-10 DIAGNOSIS — R26.9 GAIT ABNORMALITY: ICD-10-CM

## 2020-08-10 PROCEDURE — 97110 THERAPEUTIC EXERCISES: CPT | Mod: PN,CQ

## 2020-08-10 RX ORDER — CYCLOBENZAPRINE HCL 5 MG
TABLET ORAL
Qty: 60 TABLET | Refills: 0 | Status: SHIPPED | OUTPATIENT
Start: 2020-08-10 | End: 2020-10-15

## 2020-08-10 RX ORDER — METHOCARBAMOL 500 MG/1
TABLET, FILM COATED ORAL
Qty: 100 TABLET | Refills: 0 | OUTPATIENT
Start: 2020-08-10

## 2020-08-10 NOTE — PROGRESS NOTES
Physical Therapy Daily Treatment Note     Name: Audrey Natarajan  Clinic Number: [id number]    Therapy Diagnosis:   Encounter Diagnoses   Name Primary?    Decreased range of motion of left ankle Yes    Decreased strength of lower extremity     Balance problem     Gait abnormality      Physician: Donaldo Pena MD    Visit Date: 8/10/2020     Physician: Donaldo Pena MD     Physician Orders: PT Eval and Treat   Medical Diagnosis from Referral: M25.572,G89.29 (ICD-10-CM) - Chronic pain of left ankle  Evaluation Date: 6/1/2020  Authorization Period Expiration: 9/30/2020  Plan of Care Expiration: 9/1/2020  Visit # / Visits authorized: 6/ 24                                             PN Due: 8/23/2020     Time In: 1750  Time Out: 1835  Total Billable Time: 45  minutes     Precautions: T2DM, COPD    Subjective     Pt reports: She could not get shoe on because of increased swelling and pain on top of foot     She was not compliant with home exercise program.  Response to previous treatment: No  Functional change: No change     Pain: 6/10  Location: left ankle, lower leg     Objective     Audrey received therapeutic exercises to develop strength, endurance, ROM and flexibility for 40  minutes including:    Nustep 5 min  PROM dorsi/plantar flexion with stretch at end range   Ankle Pumps 3x10  Gastroc Stretch on wedge 3x30  Soleus stretch on wedge 3x30  Talocural distraction x5 minutes   standing calf raises 2 x 10  Ankle 4-ways R-tband   STS 2x10  Seated ankle DF/PF 3x10   ankle circles CW/CCW x 20  Ankle ABCs x 2 trials  Quad set 3x10  SLR 2x10     SLS on ground EO 3x30  Tandem stance on ground EO 3x30  Tandem stance on foam EC 3x30  Airex pad standing NBOS EC [id number]  Rocker board ant/post, lateral x1 minute each  Tandem walk  3 laps pole to pole     Audrey received the following manual therapy techniques: Soft tissue Mobilization were applied to the: N/a for 00 minutes, including:    Audrey  participated in neuromuscular re-education activities to improve: Balance and Proprioception for  minutes. The following activities were included:      Audrey received cold pack for 00 minutes to N/a.      Home Exercises Provided and Patient Education Provided     Education provided:   Cont to perform HEP as provided.     Written Home Exercises Provided: Patient instructed to cont prior HEP.  Exercises were reviewed and Audrey was able to demonstrate them prior to the end of the session.  Audrey demonstrated good  understanding of the education provided.     See EMR under Patient Instructions for exercises provided prior visit.    Assessment     Pt tolerated PT session well today. She enters clinic with continued pain and swelling to L ankle. ROM and stretching performed after Nustep. Pt reported decreased pain in ankle after ROM. Pt did well with standing exercises today. Addition of rocker board for balance and ROM of L ankle, and STS for CKC strengthening of BLE. Pt reported overall decreased pain at the end of session.     Pt would benefit from continued skilled therapy in order to address decreased B LE strength, gait abnormalities, decreased tolerance to activities, and poor L ankle ROM/flexibiliy.      Audrey is progressing well towards her goals.   Pt prognosis is Good.     Pt will continue to benefit from skilled outpatient physical therapy to address the deficits listed in the problem list box on initial evaluation, provide pt/family education and to maximize pt's level of independence in the home and community environment.     Pt's spiritual, cultural and educational needs considered and pt agreeable to plan of care and goals.    Anticipated barriers to physical therapy: chronicity of pain, compliance with therapy  Goals:  GOALS: Short Term Goals:  4 weeks  1.Report decreased  in  pain at worse less than  <   / =  7/10  to increase tolerance for improved functional actvities  2. Pt to improve range of  motion by 25% to allow for improved functional mobility to allow for improvement in IADLs.   3. Increased L ankle MMT 1/2 grade  to increase tolerance for ADL and work activities.  4. Pt to demonstrate ability to walk x 50 ft with no boot and pain < / = 5/10 in order to return to daily activities.   5. Pt to tolerate HEP to improve ROM and independence with ADL's     Long Term Goals: 8 weeks  1.Report decreased  in  pain at worse  less than  <   / =  3  /10  to increase tolerance for improved functional actvities  2.Pt to improve range of motion by 75% to allow for improved functional mobility to allow for improvement in IADLs.   3.Increased L ankle MMT 1 grade  to increase tolerance for ADL and work activities.  4. Pt will score > / =  45% on  FOTO outcome assessment  to increase functional activities and mobility   5. Pt to be Independent with HEP to improve ROM and independence with ADL's  6. Pt to demonstrate ability to walk x 100 ft without boot and pain < / = 2/10 in order to return to daily activities.        Plan     Plan of care Certification: 6/1/2020 to 9/1/20.    Cont skilled PT session towards PT and patient's goals.    Efrain Boswell, PTA   08/10/2020

## 2020-08-12 NOTE — PROGRESS NOTES
Physical Therapy Daily Treatment Note     Name: Audrey Natarajan  Clinic Number: [id number]    Therapy Diagnosis:   No diagnosis found.  Physician: Donaldo Pena MD    Visit Date: 8/13/2020     Physician: Donaldo Pena MD     Physician Orders: PT Eval and Treat   Medical Diagnosis from Referral: M25.572,G89.29 (ICD-10-CM) - Chronic pain of left ankle  Evaluation Date: 6/1/2020  Authorization Period Expiration: 9/30/2020  Plan of Care Expiration: 9/1/2020  Visit # / Visits authorized: 7/ 24                                             PN Due: 8/23/2020     Time In: ***  Time Out: ***   Total Billable Time: *** minutes     Precautions: T2DM, COPD    Subjective     Pt reports: ***She could not get shoe on because of increased swelling and pain on top of foot     She was not compliant with home exercise program.  Response to previous treatment: No  Functional change: No change     Pain: 6/10  Location: left ankle, lower leg     Objective     Audrey received therapeutic exercises to develop strength, endurance, ROM and flexibility for *** minutes including:    Nustep 5 min  PROM dorsi/plantar flexion with stretch at end range   Ankle Pumps 3x10  Gastroc Stretch on wedge 3x30  Soleus stretch on wedge 3x30  Talocural distraction x5 minutes   standing calf raises 2 x 10  Ankle 4-ways R-tband   STS 2x10  Seated ankle DF/PF 3x10   ankle circles CW/CCW x 20  Ankle ABCs x 2 trials  Quad set 3x10  SLR 2x10     SLS on ground EO 3x30  Tandem stance on ground EO 3x30  Tandem stance on foam EC 3x30  Airex pad standing NBOS EC [id number]  Rocker board ant/post, lateral x1 minute each  Tandem walk  3 laps pole to pole     Audrey received the following manual therapy techniques: Soft tissue Mobilization were applied to the: N/a for 00 minutes, including:    Audrey participated in neuromuscular re-education activities to improve: Balance and Proprioception for  minutes. The following activities were included:      Audrey  received cold pack for 00 minutes to N/a.      Home Exercises Provided and Patient Education Provided     Education provided:   Cont to perform HEP as provided.     Written Home Exercises Provided: Patient instructed to cont prior HEP.  Exercises were reviewed and Audrey was able to demonstrate them prior to the end of the session.  Audrey demonstrated good  understanding of the education provided.     See EMR under Patient Instructions for exercises provided prior visit.    Assessment   ***   Pt tolerated PT session well today. She enters clinic with continued pain and swelling to L ankle. ROM and stretching performed after Nustep. Pt reported decreased pain in ankle after ROM. Pt did well with standing exercises today. Addition of rocker board for balance and ROM of L ankle, and STS for CKC strengthening of BLE. Pt reported overall decreased pain at the end of session.     Pt would benefit from continued skilled therapy in order to address decreased B LE strength, gait abnormalities, decreased tolerance to activities, and poor L ankle ROM/flexibiliy.      Audrey is progressing well towards her goals.   Pt prognosis is Good.     Pt will continue to benefit from skilled outpatient physical therapy to address the deficits listed in the problem list box on initial evaluation, provide pt/family education and to maximize pt's level of independence in the home and community environment.     Pt's spiritual, cultural and educational needs considered and pt agreeable to plan of care and goals.    Anticipated barriers to physical therapy: chronicity of pain, compliance with therapy  Goals:  GOALS: Short Term Goals:  4 weeks  1.Report decreased  in  pain at worse less than  <   / =  7/10  to increase tolerance for improved functional actvities  2. Pt to improve range of motion by 25% to allow for improved functional mobility to allow for improvement in IADLs.   3. Increased L ankle MMT 1/2 grade  to increase tolerance for  ADL and work activities.  4. Pt to demonstrate ability to walk x 50 ft with no boot and pain < / = 5/10 in order to return to daily activities.   5. Pt to tolerate HEP to improve ROM and independence with ADL's     Long Term Goals: 8 weeks  1.Report decreased  in  pain at worse  less than  <   / =  3  /10  to increase tolerance for improved functional actvities  2.Pt to improve range of motion by 75% to allow for improved functional mobility to allow for improvement in IADLs.   3.Increased L ankle MMT 1 grade  to increase tolerance for ADL and work activities.  4. Pt will score > / =  45% on  FOTO outcome assessment  to increase functional activities and mobility   5. Pt to be Independent with HEP to improve ROM and independence with ADL's  6. Pt to demonstrate ability to walk x 100 ft without boot and pain < / = 2/10 in order to return to daily activities.        Plan     Plan of care Certification: 6/1/2020 to 9/1/20.    Cont skilled PT session towards PT and patient's goals.    Perla Badillo, SPT   08/12/2020

## 2020-08-13 ENCOUNTER — CLINICAL SUPPORT (OUTPATIENT)
Dept: REHABILITATION | Facility: HOSPITAL | Age: 50
End: 2020-08-13
Attending: INTERNAL MEDICINE
Payer: MEDICAID

## 2020-08-13 DIAGNOSIS — R26.89 BALANCE PROBLEM: ICD-10-CM

## 2020-08-13 DIAGNOSIS — R29.898 DECREASED STRENGTH OF LOWER EXTREMITY: ICD-10-CM

## 2020-08-13 DIAGNOSIS — M25.672 DECREASED RANGE OF MOTION OF LEFT ANKLE: ICD-10-CM

## 2020-08-13 DIAGNOSIS — R26.9 GAIT ABNORMALITY: ICD-10-CM

## 2020-08-13 PROCEDURE — 97110 THERAPEUTIC EXERCISES: CPT | Mod: PN

## 2020-08-13 NOTE — PROGRESS NOTES
Physical Therapy Daily Treatment Note     Name: Audrey Natarajan  Clinic Number: [id number]    Therapy Diagnosis:   Encounter Diagnoses   Name Primary?    Decreased range of motion of left ankle     Decreased strength of lower extremity     Balance problem     Gait abnormality      Physician: Donaldo Pena MD    Visit Date: 8/13/2020     Physician: Donaldo Pena MD     Physician Orders: PT Eval and Treat   Medical Diagnosis from Referral: M25.572,G89.29 (ICD-10-CM) - Chronic pain of left ankle  Evaluation Date: 6/1/2020  Authorization Period Expiration: 9/30/2020  Plan of Care Expiration: 9/1/2020  Visit # / Visits authorized: 6/ 24                                             PN Due: 8/23/2020     Time In: 3:45 pm  Time Out: 4:25   Total Billable Time: 45  minutes     Precautions: T2DM, COPD    Subjective     Pt reports: that she still cont with difficult to put shoes on due to swollen.     She was not compliant with home exercise program.  Response to previous treatment: No  Functional change: No change     Pain: 6/10  Location: left ankle, lower leg     Objective     Audrey received therapeutic exercises to develop strength, endurance, ROM and flexibility for 45  minutes including:    Nustep 5 min  PROM dorsi/plantar flexion with stretch at end range   Ankle Pumps 3x10  Gastroc Stretch on wedge 3x30  Soleus stretch on wedge 3x30  Talocural distraction and joint mobs x5 minutes   standing calf raises 2 x 10  Ankle 4-ways R-tband   STS 2x10  SLS on ground EO 3x30  Tandem stance on ground EO 3x30  Tandem stance on foam EC 3x30  Airex pad standing NBOS EC [id number]  Rocker board ant/post, lateral x1 minute each  Tandem walk  3 laps inside parallel bar   Toe walk inside parallel bar  3 laps   Step up and down 6 in box 2x10    NP:  Seated ankle DF/PF 3x10   ankle circles CW/CCW x 20  Ankle ABCs x 2 trials  Quad set 3x10  SLR 2x10     Audrey received the following manual therapy techniques: Soft tissue  Mobilization were applied to the: N/a for 00 minutes, including:    Audrey participated in neuromuscular re-education activities to improve: Balance and Proprioception for  minutes. The following activities were included:      Audrey received cold pack for 00 minutes to N/a.      Home Exercises Provided and Patient Education Provided     Education provided:   Cont to perform HEP as provided.     Written Home Exercises Provided: Patient instructed to cont prior HEP.  Exercises were reviewed and Audrey was able to demonstrate them prior to the end of the session.  Audrey demonstrated good  understanding of the education provided.     See EMR under Patient Instructions for exercises provided prior visit.    Assessment     Pt tolerated PT session well today. Pt presented to clinic with L ankle pain and swollen. STM was applied anterior talocrural region with decrease pain. Joint mobs applied talocrural to improve ankle DF/PF and decrease pain. Progression of CKC exercises today to increase L ankle muscle strength and mobility.  Addition of toe walk and step up and step down was tolerated well. Plan to progress exercises to functional activities     Pt would benefit from continued skilled therapy in order to address decreased B LE strength, gait abnormalities, decreased tolerance to activities, and poor L ankle ROM/flexibiliy.      Audrey is progressing well towards her goals.   Pt prognosis is Good.     Pt will continue to benefit from skilled outpatient physical therapy to address the deficits listed in the problem list box on initial evaluation, provide pt/family education and to maximize pt's level of independence in the home and community environment.     Pt's spiritual, cultural and educational needs considered and pt agreeable to plan of care and goals.    Anticipated barriers to physical therapy: chronicity of pain, compliance with therapy  Goals:  GOALS: Short Term Goals:  4 weeks  1.Report decreased  in  pain  at worse less than  <   / =  7/10  to increase tolerance for improved functional actvities  2. Pt to improve range of motion by 25% to allow for improved functional mobility to allow for improvement in IADLs.   3. Increased L ankle MMT 1/2 grade  to increase tolerance for ADL and work activities.  4. Pt to demonstrate ability to walk x 50 ft with no boot and pain < / = 5/10 in order to return to daily activities.   5. Pt to tolerate HEP to improve ROM and independence with ADL's     Long Term Goals: 8 weeks  1.Report decreased  in  pain at worse  less than  <   / =  3  /10  to increase tolerance for improved functional actvities  2.Pt to improve range of motion by 75% to allow for improved functional mobility to allow for improvement in IADLs.   3.Increased L ankle MMT 1 grade  to increase tolerance for ADL and work activities.  4. Pt will score > / =  45% on  FOTO outcome assessment  to increase functional activities and mobility   5. Pt to be Independent with HEP to improve ROM and independence with ADL's  6. Pt to demonstrate ability to walk x 100 ft without boot and pain < / = 2/10 in order to return to daily activities.        Plan     Plan of care Certification: 6/1/2020 to 9/1/20.    Cont skilled PT session towards PT and patient's goals.    Mayur He, PT   08/13/2020

## 2020-08-14 DIAGNOSIS — Z11.59 NEED FOR HEPATITIS C SCREENING TEST: ICD-10-CM

## 2020-08-27 ENCOUNTER — OFFICE VISIT (OUTPATIENT)
Dept: VASCULAR SURGERY | Facility: CLINIC | Age: 50
End: 2020-08-27
Payer: MEDICAID

## 2020-08-27 VITALS
BODY MASS INDEX: 36.95 KG/M2 | WEIGHT: 229.94 LBS | SYSTOLIC BLOOD PRESSURE: 124 MMHG | HEIGHT: 66 IN | DIASTOLIC BLOOD PRESSURE: 76 MMHG

## 2020-08-27 DIAGNOSIS — I87.2 VENOUS INSUFFICIENCY OF BOTH LOWER EXTREMITIES: ICD-10-CM

## 2020-08-27 DIAGNOSIS — G62.9 NEUROPATHY: Primary | ICD-10-CM

## 2020-08-27 PROCEDURE — 99999 PR PBB SHADOW E&M-EST. PATIENT-LVL V: CPT | Mod: PBBFAC,,, | Performed by: SURGERY

## 2020-08-27 PROCEDURE — 99214 OFFICE O/P EST MOD 30 MIN: CPT | Mod: S$PBB,,, | Performed by: SURGERY

## 2020-08-27 PROCEDURE — 99215 OFFICE O/P EST HI 40 MIN: CPT | Mod: PBBFAC | Performed by: SURGERY

## 2020-08-27 PROCEDURE — 99214 PR OFFICE/OUTPT VISIT, EST, LEVL IV, 30-39 MIN: ICD-10-PCS | Mod: S$PBB,,, | Performed by: SURGERY

## 2020-08-27 PROCEDURE — 99999 PR PBB SHADOW E&M-EST. PATIENT-LVL V: ICD-10-PCS | Mod: PBBFAC,,, | Performed by: SURGERY

## 2020-08-27 NOTE — PATIENT INSTRUCTIONS
Peripheral Neuropathy  Peripheral neuropathy is a condition that affects the nerves of the arms or legs. It causes a change in physical feeling. Sometimes it causes weakness in the muscles. You may feel tingling, numbness or shooting pains. Symptoms may be more common at night. Skin may be extra sensitive to light touch or temperature changes.  Neuropathy may be a complication of a chronic disease such as diabetes. A ruptured disk with pressure on the spinal nerve may also lead to the problem. Certain vitamin deficiencies may lead to it. It may also be caused by exposure to certain drugs or chemicals.  Home care  · Tell the healthcare provider about all medicines you take. This includes prescription and over-the-counter medicines, vitamins, and herbs. Ask if any of the medicines may be causing your problems. Do not make any changes to prescription medicines without talking to your healthcare provider first.  · You may be prescribed medicines to help relieve the tingling feeling or for pain. Take all medicines as directed.  · A numb hand or foot may be more prone to injury. To help protect it:  ¨ Always use oven mitts.  ¨ Test water with an unaffected hand or foot.  ¨ Use caution when trimming nails. File sharp areas.  ¨ Wear shoes that fit well to avoid pressure points, blisters, and ulcers.  ¨ Inspect your hands and feet carefully (including the soles of your feet and between your toes) at least once a week. If you see red areas, sores, or other problems, tell your healthcare provider.  Follow-up care  Follow up with your doctor or as advised by our staff. You may need further testing or evaluation.  When to seek medical advice  Call your healthcare provider right away if any of the following occur:  · Redness, swelling, cracking, or ulcer on any numb area, especially the feet  · New symptoms of numbness or muscle weakness numbness  · Loss of bowel or bladder control  · Slurred speech, confusion, or trouble  speaking, walking, or seeing  Date Last Reviewed: 9/26/2015  © 6373-8057 [employer]. [address] [place] [address], [place] [address]. All rights reserved. This information is not intended as a substitute for professional medical care. Always follow your healthcare professional's instructions.          Putting on Compression Stockings     Turn the stocking inside-out, then fit it over your toes and heel.          Roll the stocking up your leg.            Once stockings are on, make sure the top of the stocking is about two fingers width below the crease of the knee (or the groin if you wear thigh-high stockings).          Use equipment, such as a stocking melisa, or wear rubber gloves to make it easier to put on compression stockings.         Elastic compression stockings are prescribed to treat many vein problems. Wearing them may be the most important thing you do to manage your symptoms. The stockings fit tightly around your ankle, gradually reducing in pressure as they go up your legs. This helps keep blood flowing to your heart. As a result, swelling is reduced. Your healthcare provider will prescribe stockings at a safe pressure for you. He or she will also tell you how often to wear and remove the stockings. Follow these instructions closely. Also, do not buy or wear compression stockings without first seeing your healthcare provider.  Tips for wear and care  To wear stockings safely and to get the most benefit:  · Wear the length prescribed by your healthcare provider.  · Pull them to the designated height and no farther. Dont let them bunch at the top. This can restrict blood flow and increase swelling.  · Wear the stockings for the amount of time your healthcare provider recommends. Replace them when they start to feel loose. This will likely be every 3 to 6 months.  · Remove them as your healthcare provider directs. When removed, wash your legs. Then check your legs and feet for sores. Call your  healthcare provider if you find a sore. Dont put the stockings back on unless your healthcare provider directs.   · Wash the stockings as instructed. They may need to be hand-washed.  Date Last Reviewed: 5/1/2016 © 2000-2017 [employer]. [address] [place] [id number]. All rights reserved. This information is not intended as a substitute for professional medical care. Always follow your healthcare professional's instructions.        Understanding Chronic Venous Insufficiency  Problems with the veins in the legs may lead to chronic venous insufficiency (CVI). CVI means that there is a long-term problem with the veins not being able to pump blood back to your heart. When this happens, blood stays in the legs and causes swelling and aching.   Two problems that may lead to chronic venous insufficiency are:  · Damaged valves. Valves keep blood flowing from the legs through the blood vessels and back to the heart. When the valves are damaged, blood does not flow as well.   · Deep vein thrombosis (DVT). Blood clots may form in the deep veins of the legs. This may cause pain, redness, and swelling in the legs. It may also block the flow of blood back to the heart. Seek immediate medical care if you have these symptoms.  · A blood clot in the leg can also break off and travel to the lungs. This is called pulmonary embolism (PE). In the lungs, the clot can cut off the flow of blood. This may cause chest pain, trouble breathing, sweating, a fast heartbeat, coughing (may cough up blood), and fainting. It is a medical emergency and may cause death. Call 911 if you have these symptoms.  · Healthcare providers call the two conditions, DVT and PE, venous thromboembolism (VTE).  CVI cant be cured, but you can control leg swelling to reduce the likelihood of ulcers (sores).  Recognizing the symptoms  Be aware of the following:  · If you stand or sit with your feet down for long periods, your legs may ache  or feel heavy.  · Swollen ankles are possibly the most common symptom of CVI.  · As swelling increases, the skin over your ankles may show red spots or a brownish tinge. The skin may feel leathery or scaly, and may start to itch.  · If swelling is not controlled, an ulcer (open wound) may form.  What you can do  Reduce your risk of developing ulcers by doing the following:  · Increase blood flow back to your heart by elevating your legs, exercising daily, and wearing elastic stockings.  · Boost blood flow in your legs by losing excess weight.  · If you must stand or sit in one place for a period of time, keep your blood moving by wiggling your toes, shifting your body position, and rising up on the balls of your feet.    Date Last Reviewed: 5/1/2016 © 2000-2017 [employer]. [address] [place] [address]. All rights reserved. This information is not intended as a substitute for professional medical care. Always follow your healthcare professional's instructions.        Tips for Using Less Salt    Most people with heart problems need to eat less salt (sodium). Reducing the amount of salt you eat may help control your blood pressure. The higher your blood pressure, the greater your risk for heart disease, stroke, blindness, and kidney problems.  At the store  · Make low-salt choices by reading labels carefully. Look for the total amount of sodium per serving.  · Use more fresh food. Buy more fruits and vegetables. Select lean meats, fish, and poultry.  · Use fewer frozen, canned, and packaged foods which often contain a lot of sodium.  · Use plain frozen vegetables without sauces or toppings. These products are often low- or no-sodium.  · Opt for reduced-sodium or no-salt-added versions of canned vegetables and soups.  In the kitchen  · Don't add salt to food when you're cooking. Season with flavorings such as onion, garlic, pepper, salt-free herbal blends, and lemon or lime juice.  · Use a  cookbook containing low-salt recipes. It can give you ideas for tasty meals that are healthy for your heart.  · Sprinkle salt-free herbal blends on vegetables and meat.  · Drain and rinse canned foods, such as canned beans and vegetables, before cooking or eating.  Eating out  · Tell the  you're on a low-salt diet. Ask questions about the menu.  · Order fish, chicken, and meat broiled, baked, poached, or grilled without salt, butter, or breading.  · Use lemon, pepper, and salt-free herb mixes to add flavor.  · Choose plain steamed rice, boiled noodles, and baked or boiled potatoes. Top potatoes with chives and a little sour cream.     Beware! Salt goes by many other names. Limit foods with these words listed as ingredients: salt, sodium, soy sauce, baking soda, baking powder, MSG, monosodium, Na (the chemical symbol for sodium). Some antacids are also high in salt.   Date Last Reviewed: 6/19/2015 © 2000-2017 [employer]. [address] [place] [address]. All rights reserved. This information is not intended as a substitute for professional medical care. Always follow your healthcare professional's instructions.        Low-Salt Diet  This diet removes foods that are high in salt. It also limits the amount of salt you use when cooking. It is most often used for people with high blood pressure, edema (fluid retention), and kidney, liver, or heart disease.  Table salt contains the mineral sodium. Your body needs sodium to work normally. But too much sodium can make your health problems worse. Your healthcare provider is recommending a low-salt (also called low-sodium) diet for you. Your total daily allowance of salt is 1,500 to 2,300 milligrams (mg). It is less than 1 teaspoon of table salt. This means you can have only about 500 to 700 mg of sodium at each meal. People with certain health problems should limit salt intake to the lower end of the recommended range.    When you cook, dont  add much salt. If you can cook without using salt, even better. Dont add salt to your food at the table.  When shopping, read food labels. Salt is often called sodium on the label. Choose foods that are salt-free, low salt, or very low salt. Note that foods with reduced salt may not lower your salt intake enough.    Beans, potatoes, and pasta  Ok: Dry beans, split peas, lentils, potatoes, rice, macaroni, pasta, spaghetti without added salt  Avoid: Potato chips, tortilla chips, and similar products  Breads and cereals  Ok: Low-sodium breads, rolls, cereals, and cakes; low-salt crackers, matzo crackers  Avoid: Salted crackers, pretzels, popcorn, Qatari toast, pancakes, muffins  Dairy  Ok: Milk, chocolate milk, hot chocolate mix, low-salt cheeses, and yogurt  Avoid: Processed cheese and cheese spreads; Roquefort, Camembert, and cottage cheese; buttermilk, instant breakfast drink  Desserts  Ok: Ice cream, frozen yogurt, juice bars, gelatin, cookies and pies, sugar, honey, jelly, hard candy  Avoid: Most pies, cakes and cookies prepared or processed with salt; instant pudding  Drinks  Ok: Tea, coffee, fizzy (carbonated) drinks, juices  Avoid: Flavored coffees, electrolyte replacement drinks, sports drinks  Meats  Ok: All fresh meat, fish, poultry, low-salt tuna, eggs, egg substitute  Avoid: Smoked, pickled, brine-cured, or salted meats and fish. This includes chavez, chipped beef, corned beef, hot dogs, deli meats, ham, kosher meats, salt pork, sausage, canned tuna, salted codfish, smoked salmon, herring, sardines, or anchovies.  Seasonings and spices  Ok: Most seasonings are okay. Good substitutes for salt include: fresh herb blends, hot sauce, lemon, garlic, wilcox, vinegar, dry mustard, parsley, cilantro, horseradish, tomato paste, regular margarine, mayonnaise, unsalted butter, cream cheese, vegetable oil, cream, low-salt salad dressing and gravy.  Avoid: Regular ketchup, relishes, pickles, soy sauce, teriyaki sauce,  [facility] sauce, BBQ sauce, tartar sauce, meat tenderizer, chili sauce, regular gravy, regular salad dressing, salted butter  Soups  Ok: Low-salt soups and broths made with allowed foods  Avoid: Bouillon cubes, soups with smoked or salted meats, regular soup and broth  Vegetables  Ok: Most vegetables are okay; also low-salt tomato and vegetable juices  Avoid: Sauerkraut and other brine-soaked vegetables; pickles and other pickled vegetables; tomato juice, olives  Date Last Reviewed: 8/1/2016 © 2000-2017 [employer]. [address] [place] [address], [place] [address]. All rights reserved. This information is not intended as a substitute for professional medical care. Always follow your healthcare professional's instructions.        Low-Salt Choices  Eating salt (sodium) can make your body retain too much water. Excess water makes your heart work harder. Canned, packaged, and frozen foods are easy to prepare, but they are often high in sodium. Here are some ideas for low-salt foods you can easily prepare yourself.    For breakfast  · Fruit or 100% fruit juice  · Whole-wheat bread or an English muffin. Compare sodium content on labels.  · Low-fat milk or yogurt  · Unsalted eggs  · Shredded wheat  · Corn tortillas  · Unsalted steamed rice  · Regular (not instant) hot cereal, made without salt  Stay away from:  · Sausage, chavez, and ham  · Flour tortillas  · Packaged muffins, pancakes, and biscuits  · Instant hot cereals  · Cottage cheese  For lunch and dinner  · Fresh fish, chicken, turkey, or meat--baked, broiled, or roasted without salt  · Dry beans, cooked without salt  · Tofu, stir-fried without salt  · Unsalted fresh fruit and vegetables, or frozen or canned fruit and vegetables with no added salt  Stay away from:  · Lunch or deli meat that is cured or smoked  · Cheese  · Tomato juice and catsup  · Canned vegetables, soups, and fish not labeled as no-salt-added or reduced sodium  · Packaged gravies and  sauces  · Olives, pickles, and relish  · Bottled salad dressings  For snacks and desserts  · Yogurt  · Unsalted, air popped popcorn  · Unsalted nuts or seeds  Stay away from:  · Pies and cakes  · Packaged dessert mixes  · Pizza  · Canned and packaged puddings  · Pretzels, chips, crackers, and nuts--unless the label says unsalted  Date Last Reviewed: 6/17/2015 © 2000-2017 [employer]. [address] [place] [id number]. All rights reserved. This information is not intended as a substitute for professional medical care. Always follow your healthcare professional's instructions.

## 2020-08-27 NOTE — PROGRESS NOTES
"       Tone Mata MD RPVI                       [facility]                         08/27/2020    HPI:  uAdrey Natarajan is a 50 y.o. female with   Patient Active Problem List   Diagnosis    Essential hypertension    COPD (chronic obstructive pulmonary disease)    Hypertriglyceridemia    Tobacco abuse    Mild protein malnutrition    Diabetic neuropathy    Leg swelling    Incisional hernia without mention of obstruction or gangrene    DM type 2 without retinopathy    History of DVT (deep vein thrombosis)    History of pulmonary embolus (PE)    Bipolar 1 disorder    Gastroparesis due to DM    Thrombocytosis    Leukocytosis    Morbid obesity    Type 2 diabetes mellitus    Acne    Other chronic pain    Vomiting and diarrhea    Nuclear sclerosis of both eyes    Bilateral ocular hypertension    Refractive error    Long term (current) use of anticoagulants    Hypercoagulable state    History of pulmonary embolism    DVT, recurrent, lower extremity, chronic, left    Decreased ROM of ankle    Decreased strength of lower extremity    Balance problem    Gait abnormality    being managed by PCP and specialists who is here today for evaluation of BLE pain.  9/1/19 presented to ER due to RLE cramping and LLE edema.  S/p LLE DVT 2003, unprovoked and treated with anticoagulation.  S/p PE and L femoral and PT DVT 2013 and had a [place] retrievable filter placed 2013; has had persistent pain and edema since that time.  Repeat US 9/1/19 in ER due to pain/edema showed chronic L PT.  Pt states 2013 after she injured her LLE and had a bleeding vein she had a "vein stripping" to the outside of her LLE.  Patient states location is BLE occurring for 6 yrs.  Associated signs and symptoms include discoloration.  Quality is sharp/throbbing and severity is 10/10 at worst, average 7/10.  Symptoms began 6 yrs ago.  Alleviating factors include elevation.  Worsening factors include dependency.  " "Denies claudication.      no MI  no Stroke  Tobacco use: 3 cig/day; prev 1 ppd x 35 yrs    12/2019: c/o severe LLE pain.  +compression daily.  C/o stockings being too tight.  States bilateral foot paresthesias.      3/2020:  Cont to c/o LLE pain.  Cannot tolerate compression.      5/2020:  C/o sharp LLE pain that limits her ambulating and sleep despite OTC pain medications.      8/2020:  Cont c/o LLE MSK and neuropathic pain.  +edema.  +compression/elevation > 3 months with continued decreased ability to perform ADLs and ambulation.    Past Medical History:   Diagnosis Date    ADHD (attention deficit hyperactivity disorder)     Arthritis     Asthma     Bipolar 1 disorder     Cataract     COPD (chronic obstructive pulmonary disease)     Coronary artery disease     A fib    Depression     bipolar manic depresson    Diabetes mellitus     DVT of lower extremity, bilateral July 2013    bilateral LE DVT. [place] filter placed.     Encounter for blood transfusion     History of blood clots 1. Left Leg=2003; 2.Bilateral Groin=Blood Clots= 5 or 6/ 2013 & 7/2013; 3. LLL of Lung=7/2013;  4. Lt. Lower Leg=7/2013.     Pt. had 1st Blood Clot after [id number], & Last=2013. [place] Filter= Rt.Lateral Neck.    HTN (hypertension) 6/6/2013    Pt states that she does not have hypertension    Hypercholesteremia     Irregular heartbeat     Neuromuscular disorder     neuropathy feet    PE (pulmonary embolism) July 2013     bilat LE DVT.     Restless leg syndrome      Past Surgical History:   Procedure Laterality Date    ABDOMINAL SURGERY      BILATERAL OOPHORECTOMY Bilateral 1/12/2015    Green' s filter Right 7/4/2012    Right Neck & Tunneled Down.    HERNIA REPAIR      "[place] of Hernias Repaires around th [place].", pt. states    OOPHORECTOMY      OVARIAN CYST REMOVAL  3/13/2014    TX REMOVAL OF OVARY/TUBE(S)      SPLENECTOMY, TOTAL  July 2003    TONSILLECTOMY      as a child    TYMPANOSTOMY " TUBE PLACEMENT  1976    VEIN SURGERY  2003    Lt leg     Family History   Problem Relation Age of Onset    Hypertension Father     Diabetes Father     Heart disease Father     Cataracts Father     Diabetes Paternal Grandfather     Heart disease Paternal Grandfather     No Known Problems Mother     Ovarian cancer Maternal Grandmother          from this. ? age     No Known Problems Sister     No Known Problems Brother     No Known Problems Maternal Aunt     No Known Problems Maternal Uncle     No Known Problems Paternal Aunt     No Known Problems Paternal Uncle     No Known Problems Maternal Grandfather     Ovarian cancer Paternal Grandmother     Uterine cancer Neg Hx     Breast cancer Neg Hx     Colon cancer Neg Hx     Amblyopia Neg Hx     Blindness Neg Hx     Cancer Neg Hx     Glaucoma Neg Hx     Macular degeneration Neg Hx     Retinal detachment Neg Hx     Strabismus Neg Hx     Stroke Neg Hx     Thyroid disease Neg Hx      Social History     Socioeconomic History    Marital status:      Spouse name: Not on file    Number of children: Not on file    Years of education: Not on file    Highest education level: Not on file   Occupational History    Not on file   Social Needs    Financial resource strain: Not on file    Food insecurity     Worry: Not on file     Inability: Not on file    Transportation needs     Medical: Not on file     Non-medical: Not on file   Tobacco Use    Smoking status: Current Every Day Smoker     Packs/day: 0.50     Years: 25.00     Pack years: 12.50     Types: Cigarettes    Smokeless tobacco: Never Used    Tobacco comment: 20 down to 2 cigarettes   Substance and Sexual Activity    Alcohol use: No     Alcohol/week: 0.0 standard drinks    Drug use: No    Sexual activity: Not Currently   Lifestyle    Physical activity     Days per week: Not on file     Minutes per session: Not on file    Stress: Not on file   Relationships    Social  connections     Talks on phone: Not on file     Gets together: Not on file     Attends Yarsanism service: Not on file     Active member of club or organization: Not on file     Attends meetings of clubs or organizations: Not on file     Relationship status: Not on file   Other Topics Concern    Not on file   Social History Narrative    Not on file       Current Outpatient Medications:     aspirin (ECOTRIN) 81 MG EC tablet, Take 81 mg by mouth once daily. , Disp: , Rfl:     azelastine (ASTELIN) 137 mcg (0.1 %) nasal spray, 1 spray (137 mcg total) by Nasal route 2 (two) times daily., Disp: 30 mL, Rfl: 0    b complex vitamins tablet, Take 1 tablet by mouth once daily., Disp: 90 tablet, Rfl: 2    carbamazepine (TEGRETOL) 200 mg tablet, TK 2 TS PO BID, Disp: , Rfl: 1    ciclopirox (LOPROX) 0.77 % Susp, Apply topically once daily., Disp: 30 mL, Rfl: 3    clotrimazole (LOTRIMIN) 1 % cream, Apply topically 2 (two) times daily., Disp: 28 g, Rfl: 1    cyanocobalamin (VITAMIN B-12) 100 MCG tablet, Take 1 tablet (100 mcg total) by mouth once daily., Disp: 90 tablet, Rfl: 1    DULERA 100-5 mcg/actuation HFAA, INHALE 2 PUFFS INTO THE LUNGS TWICE DAILY, Disp: 13 g, Rfl: 2    famotidine (PEPCID) 20 MG tablet, Take 1 tablet (20 mg total) by mouth 2 (two) times daily., Disp: 10 tablet, Rfl: 0    fish oil-omega-3 fatty acids 300-1,000 mg capsule, Take 2 capsules by mouth once daily. Instructed to stop 5-7 days before surgery (8/11/16)., Disp: 60 capsule, Rfl: 5    fluticasone propionate (FLONASE) 50 mcg/actuation nasal spray, SHAKE LIQUID AND USE 1 SPRAY(50 MCG) IN EACH NOSTRIL TWICE DAILY, Disp: 16 g, Rfl: 3    furosemide (LASIX) 20 MG tablet, TAKE 1 TABLET(20 MG) BY MOUTH EVERY DAY, Disp: 30 tablet, Rfl: 2    gabapentin (NEURONTIN) 600 MG tablet, TAKE 1 TABLET(600 MG) BY MOUTH TWICE DAILY, Disp: 180 tablet, Rfl: 2    hydrOXYzine pamoate (VISTARIL) 25 MG Cap, , Disp: , Rfl:     lisinopriL (PRINIVIL,ZESTRIL) 5 MG  tablet, TAKE 1 TABLET(5 MG) BY MOUTH EVERY DAY, Disp: 90 tablet, Rfl: 2    loratadine (CLARITIN) 10 mg tablet, Take 1 tablet (10 mg total) by mouth once daily., Disp: 30 tablet, Rfl: 5    metFORMIN (GLUCOPHAGE) 1000 MG tablet, Take 1 tablet (1,000 mg total) by mouth 2 (two) times daily with meals., Disp: 180 tablet, Rfl: 2    multivitamin (THERAGRAN) per tablet, Take 1 tablet by mouth every morning., Disp: 90 tablet, Rfl: 2    nicotine polacrilex 2 MG Lozg, 1-2 per hour in place of cigarettes maximum of 20 per day., Disp: 168 lozenge, Rfl: 0    pravastatin (PRAVACHOL) 40 MG tablet, TAKE 1 TABLET(40 MG) BY MOUTH EVERY DAY, Disp: 90 tablet, Rfl: 2    risperidone (RISPERDAL) 3 MG Tab, take at bedtime, Disp: , Rfl: 1    VYVANSE 50 mg capsule, TK ONE C PO QAM, Disp: , Rfl: 0    acetaminophen (ARTHRITIS PAIN RELIEVER) 650 MG TbSR, Take 650 mg by mouth. Pt states taking 2 -3 times a day, Disp: , Rfl:     albuterol (ACCUNEB) 0.63 mg/3 mL Nebu, Take 3 mLs (0.63 mg total) by nebulization every 8 (eight) hours as needed (wheezing). Rescue (Patient not taking: Reported on 8/27/2020), Disp: 1 Box, Rfl: 1    albuterol (PROAIR HFA) 90 mcg/actuation inhaler, INHALE 2 PUFFS BY MOUTH INTO THE LUNGS EVERY 6 HOURS AS NEEDED FOR WHEEZING. RESCUE (Patient not taking: Reported on 8/27/2020), Disp: 8.5 g, Rfl: 1    blood-glucose meter kit, To check BG once daily, to use with insurance preferred meter, Disp: 1 each, Rfl: 0    ciclopirox-nail lacquer removr 8 % Kit, Apply 1 application topically once a week., Disp: 1 kit, Rfl: 4    cyclobenzaprine (FLEXERIL) 5 MG tablet, TAKE 1 TABLET(5 MG) BY MOUTH THREE TIMES DAILY AS NEEDED FOR MUSCLE SPASMS (Patient not taking: Reported on 8/27/2020), Disp: 60 tablet, Rfl: 0    diphenhydrAMINE (BENADRYL) 50 MG capsule, Take 1 capsule (50 mg total) by mouth every 6 (six) hours as needed for Itching or Allergies (Swelling of the throat, rash and shortness of breath). (Patient not taking:  Reported on 8/27/2020), Disp: 20 capsule, Rfl: 0    DUPIXENT 300 mg/2 mL Syrg, inject 300mg SUBCUTANEOUSLY every other week, Disp: , Rfl: 6    lancets Misc, To check BG once daily, to use with insurance preferred meter (Patient not taking: Reported on 8/27/2020), Disp: 30 each, Rfl: 2    lidocaine 3 % Crea, Rub on lower back for pain as needed no more than 3 times a day (Patient not taking: Reported on 8/27/2020), Disp: 1 Tube, Rfl: 0    meloxicam (MOBIC) 15 MG tablet, TAKE 1 TABLET(15 MG) BY MOUTH EVERY DAY (Patient not taking: Reported on 8/27/2020), Disp: 30 tablet, Rfl: 2    methylPREDNISolone (MEDROL DOSEPACK) 4 mg tablet, use as directed (Patient not taking: Reported on 8/27/2020), Disp: 1 Package, Rfl: 0    metoprolol tartrate (LOPRESSOR) 50 MG tablet, Take 1 tablet (50 mg total) by mouth 2 (two) times daily. (Patient not taking: Reported on 8/27/2020), Disp: 60 tablet, Rfl: 2    mometasone-formoterol (DULERA) 200-5 mcg/actuation inhaler, Inhale 1 puff into the lungs 2 (two) times daily. (Patient not taking: Reported on 8/27/2020), Disp: 8.8 g, Rfl: 1    nystatin (NYSTOP) powder, Apply topically 2 (two) times daily. Apply to axilla and breast folds (Patient not taking: Reported on 8/27/2020), Disp: 60 g, Rfl: 2    ondansetron (ZOFRAN) 4 MG tablet, Take 1 tablet (4 mg total) by mouth every 8 (eight) hours as needed for Nausea. (Patient not taking: Reported on 8/27/2020), Disp: 30 tablet, Rfl: 0    pantoprazole (PROTONIX) 40 MG tablet, TAKE 1 TABLET(40 MG) BY MOUTH EVERY DAY (Patient not taking: Reported on 8/27/2020), Disp: 30 tablet, Rfl: 5    SITagliptin (JANUVIA) 100 MG Tab, Take 1 tablet (100 mg total) by mouth once daily. (Patient not taking: Reported on 8/27/2020), Disp: 90 tablet, Rfl: 0    REVIEW OF SYSTEMS:  General: No fevers or chills; ENT: No sore throat; Allergy and Immunology: no persistent infections; Hematological and Lymphatic: No history of bleeding or easy bruising; Endocrine:  negative; Respiratory: no cough, shortness of breath, or wheezing; Cardiovascular: no chest pain or dyspnea on exertion; Gastrointestinal: no abdominal pain/back, change in bowel habits, or bloody stools; Genito-Urinary: no dysuria, trouble voiding, or hematuria; Musculoskeletal: negative; Neurological: no TIA or stroke symptoms; Psychiatric: no nervousness, anxiety or depression.    PHYSICAL EXAM:                General appearance:  Alert, well-appearing, and in no distress.  Oriented to person, place, and time                    Neurological: Normal speech, no focal findings noted; CN II - XII grossly intact. RLE with sensation to light touch, LLE with sensation to light touch.            Musculoskeletal: Digits/nail without cyanosis/clubbing.  Strength 5/5 BLE.                    Neck: Supple, no significant adenopathy, no carotid bruit can be auscultated                  Chest:  Clear to auscultation, no wheezes, rales or rhonchi, symmetric air entry. No use of accessory muscles               Cardiac: Normal rate and regular rhythm, S1 and S2 normal            Abdomen: Soft, nontender, nondistended, no masses or organomegaly, no hernia     No rebound tenderness noted; bowel sounds normal     Pulsatile aortic mass is non palpable.     No groin adenopathy      Extremities:        2+ R DP pulse, 2+ L DP pulse     2+ RLE edema, 2+ LLE edema    Skin: RLE without wounds; LLE without wounds    LAB RESULTS:  No results found for: CBC  Lab Results   Component Value Date    LABPROT 9.7 06/16/2020    INR 0.9 06/16/2020     Lab Results   Component Value Date     06/16/2020    K 4.7 06/16/2020    CL 99 06/16/2020    CO2 28 06/16/2020     (H) 06/16/2020    BUN 7 06/16/2020    CREATININE 0.8 06/16/2020    CALCIUM 9.1 06/16/2020    ANIONGAP 10 06/16/2020    EGFRNONAA >60 06/16/2020     Lab Results   Component Value Date    WBC 13.32 (H) 06/16/2020    RBC 4.44 06/16/2020    HGB 12.9 06/16/2020    HCT 40.7 06/16/2020     MCV 92 06/16/2020    MCH 29.1 06/16/2020    MCHC 31.7 (L) 06/16/2020    RDW 14.6 (H) 06/16/2020     (H) 06/16/2020    MPV 10.1 06/16/2020    GRAN 5.3 06/16/2020    GRAN 39.7 06/16/2020    LYMPH 6.1 (H) 06/16/2020    LYMPH 45.7 06/16/2020    MONO 1.1 (H) 06/16/2020    MONO 8.2 06/16/2020    EOS 0.7 (H) 06/16/2020    BASO 0.10 06/16/2020    EOSINOPHIL 5.2 06/16/2020    BASOPHIL 0.8 06/16/2020    DIFFMETHOD Automated 06/16/2020     .  Lab Results   Component Value Date    HGBA1C 7.6 (H) 06/16/2020       IMAGING:  All pertinent imaging has been reviewed and interpreted independently.    Venous US 9/2019: Left 1 of 2 PT vein with thrombus present, similar to previous US     9/16/19 JEYSON:  1. Right lower extremity pressures and waveforms indicate no hemodynamically significant arterial occlusive disease.  2. Left lower extremity pressures and waveforms indicate no hemodynamically significant arterial occlusive disease.     Venous reflux 12/12/19: RLE with GSV reflux at distal thigh to calf.  No DVT.    DVT LLE US 12/16/19: No LLE DVT    LLE venous US 3/2020:  Left lower extremity venous ultrasound shows greater saphenous vein reflux at the distal thigh.  There is popliteal vein reflux.  There is no lesser saphenous vein reflux.  There is no anterior accessory saphenous venous reflux.  There is no DVT.    IMP/PLAN:  50 y.o. female with   Patient Active Problem List   Diagnosis    Essential hypertension    COPD (chronic obstructive pulmonary disease)    Hypertriglyceridemia    Tobacco abuse    Mild protein malnutrition    Diabetic neuropathy    Leg swelling    Incisional hernia without mention of obstruction or gangrene    DM type 2 without retinopathy    History of DVT (deep vein thrombosis)    History of pulmonary embolus (PE)    Bipolar 1 disorder    Gastroparesis due to DM    Thrombocytosis    Leukocytosis    Morbid obesity    Type 2 diabetes mellitus    Acne    Other chronic pain     Vomiting and diarrhea    Nuclear sclerosis of both eyes    Bilateral ocular hypertension    Refractive error    Long term (current) use of anticoagulants    Hypercoagulable state    History of pulmonary embolism    DVT, recurrent, lower extremity, chronic, left    Decreased ROM of ankle    Decreased strength of lower extremity    Balance problem    Gait abnormality    being managed by PCP and specialists who is here today for evaluation of BLE pain and edema.    -LLE pain and edema likely due to post thrombotic syndrome, no DVT on repeat venous duplex US with L distal thigh GSV reflux -recommend compression with Rx stockings, elevation, dietary changes associated with water and sodium intake discussed at length with patient   -R GSV distal reflux symptomatic although not lifestyle limiting- recommend compression with Rx stockings, elevation, dietary changes associated with water and sodium intake discussed at length with patient  -Repeat venous reflux US  -Rec cont exercise  -Rec optimization of neuropathic and MSK pain with NSGY, Pain mgmt and Rheumatology - refer to Neuro and have pt f/u with NSGY; do not feel that laser ablation of isolated distal thigh GSV reflux will resolve her pain, will address once pain optimized and determine if EVLT necessary at that time  -Cont Hematology for comprehensive mgmt of thrombotic events  -RTC 3 mo for further evaluation    I spent 14 minutes evaluating this patient and greater than 50% of the time was spent counseling, coordinator care and discussing the plan of care.  All questions were answered and patient stated understanding with agreement with the above treatment plan.    Tone Mata MD [facility]  Vascular and Endovascular Surgery

## 2020-08-27 NOTE — LETTER
August 27, 2020        Donaldo Pena MD  [address] [facility] [address]             [facility]  [address]  [facility] [address]  Phone: [phone]  Fax: [phone]   Patient: Audrey Natarajan   MR Number: [id number]   YOB: 1970   Date of Visit: 8/27/2020       Dear Dr. Pena:    Thank you for referring Audrey Natarajan to me for evaluation. Below are the relevant portions of my assessment and plan of care.            If you have questions, please do not hesitate to call me. I look forward to following Audrey along with you.    Sincerely,      Tone Mata MD           CC  No Recipients

## 2020-09-03 DIAGNOSIS — M54.50 CHRONIC LEFT-SIDED LOW BACK PAIN WITHOUT SCIATICA: ICD-10-CM

## 2020-09-03 DIAGNOSIS — B35.9 TINEA: ICD-10-CM

## 2020-09-03 DIAGNOSIS — G89.29 CHRONIC LEFT-SIDED LOW BACK PAIN WITHOUT SCIATICA: ICD-10-CM

## 2020-09-03 RX ORDER — NYSTATIN 100000 [USP'U]/G
POWDER TOPICAL
Qty: 60 G | Refills: 2 | Status: SHIPPED | OUTPATIENT
Start: 2020-09-03 | End: 2021-01-11 | Stop reason: SDUPTHER

## 2020-09-03 RX ORDER — IBUPROFEN 600 MG/1
TABLET ORAL
Qty: 60 TABLET | Refills: 1 | OUTPATIENT
Start: 2020-09-03

## 2020-09-05 ENCOUNTER — HOSPITAL ENCOUNTER (EMERGENCY)
Facility: HOSPITAL | Age: 50
Discharge: HOME | End: 2020-09-05
Attending: INTERNAL MEDICINE
Payer: MEDICAID

## 2020-09-05 VITALS
RESPIRATION RATE: 18 BRPM | DIASTOLIC BLOOD PRESSURE: 76 MMHG | SYSTOLIC BLOOD PRESSURE: 122 MMHG | OXYGEN SATURATION: 98 % | HEIGHT: 66 IN | WEIGHT: 230 LBS | TEMPERATURE: 98 F | HEART RATE: 86 BPM | BODY MASS INDEX: 36.96 KG/M2

## 2020-09-05 DIAGNOSIS — K80.20 CALCULUS OF GALLBLADDER WITHOUT CHOLECYSTITIS WITHOUT OBSTRUCTION: Primary | ICD-10-CM

## 2020-09-05 LAB
ALBUMIN SERPL-MCNC: 3.4 G/DL (ref 3.3–5.5)
ALBUMIN SERPL-MCNC: 3.6 G/DL (ref 3.3–5.5)
ALP SERPL-CCNC: 75 U/L (ref 42–141)
ALP SERPL-CCNC: 88 U/L (ref 42–141)
BILIRUB SERPL-MCNC: 0.3 MG/DL (ref 0.2–1.6)
BILIRUB SERPL-MCNC: 0.3 MG/DL (ref 0.2–1.6)
BILIRUBIN, POC UA: NEGATIVE
BLOOD, POC UA: NEGATIVE
BUN SERPL-MCNC: 12 MG/DL (ref 7–22)
CALCIUM SERPL-MCNC: 9.4 MG/DL (ref 8–10.3)
CHLORIDE SERPL-SCNC: 99 MMOL/L (ref 98–108)
CLARITY, POC UA: CLEAR
COLOR, POC UA: YELLOW
CREAT SERPL-MCNC: 0.7 MG/DL (ref 0.6–1.2)
GLUCOSE SERPL-MCNC: 156 MG/DL (ref 73–118)
GLUCOSE, POC UA: ABNORMAL
KETONES, POC UA: NEGATIVE
LEUKOCYTE EST, POC UA: NEGATIVE
NITRITE, POC UA: NEGATIVE
PH UR STRIP: 6 [PH]
POC ALT (SGPT): 17 U/L (ref 10–47)
POC ALT (SGPT): 22 U/L (ref 10–47)
POC AMYLASE: 33 U/L (ref 14–97)
POC AST (SGOT): 21 U/L (ref 11–38)
POC AST (SGOT): 22 U/L (ref 11–38)
POC GGT: 128 U/L (ref 5–65)
POC TCO2: 27 MMOL/L (ref 18–33)
POTASSIUM BLD-SCNC: 3.7 MMOL/L (ref 3.6–5.1)
PROTEIN, POC UA: NEGATIVE
PROTEIN, POC: 6.5 G/DL (ref 6.4–8.1)
PROTEIN, POC: 6.7 G/DL (ref 6.4–8.1)
SODIUM BLD-SCNC: 137 MMOL/L (ref 128–145)
SPECIFIC GRAVITY, POC UA: 1.02
UROBILINOGEN, POC UA: 0.2 E.U./DL

## 2020-09-05 PROCEDURE — 63600175 PHARM REV CODE 636 W HCPCS: Mod: ER | Performed by: INTERNAL MEDICINE

## 2020-09-05 PROCEDURE — 96374 THER/PROPH/DIAG INJ IV PUSH: CPT | Mod: ER

## 2020-09-05 PROCEDURE — 81003 URINALYSIS AUTO W/O SCOPE: CPT | Mod: ER

## 2020-09-05 PROCEDURE — 85025 COMPLETE CBC W/AUTO DIFF WBC: CPT | Mod: ER

## 2020-09-05 PROCEDURE — 80053 COMPREHEN METABOLIC PANEL: CPT | Mod: ER

## 2020-09-05 PROCEDURE — 99284 EMERGENCY DEPT VISIT MOD MDM: CPT | Mod: 25,ER

## 2020-09-05 PROCEDURE — 82150 ASSAY OF AMYLASE: CPT | Mod: ER

## 2020-09-05 RX ORDER — KETOROLAC TROMETHAMINE 30 MG/ML
30 INJECTION, SOLUTION INTRAMUSCULAR; INTRAVENOUS
Status: COMPLETED | OUTPATIENT
Start: 2020-09-05 | End: 2020-09-05

## 2020-09-05 RX ORDER — KETOROLAC TROMETHAMINE 10 MG/1
10 TABLET, FILM COATED ORAL 3 TIMES DAILY PRN
Qty: 10 TABLET | Refills: 0 | Status: SHIPPED | OUTPATIENT
Start: 2020-09-05 | End: 2020-09-09 | Stop reason: CLARIF

## 2020-09-05 RX ADMIN — KETOROLAC TROMETHAMINE 30 MG: 30 INJECTION, SOLUTION INTRAMUSCULAR at 08:09

## 2020-09-05 NOTE — ED NOTES
Past Medical History:   Diagnosis Date    ADHD (attention deficit hyperactivity disorder)     Arthritis     Asthma     Bipolar 1 disorder     Cataract     COPD (chronic obstructive pulmonary disease)     Coronary artery disease     A fib    Depression     bipolar manic depresson    Diabetes mellitus     DVT of lower extremity, bilateral July 2013    bilateral LE DVT. [place] filter placed.     Encounter for blood transfusion     History of blood clots 1. Left Leg=2003; 2.Bilateral Groin=Blood Clots= 5 or 6/ 2013 & 7/2013; 3. LLL of Lung=7/2013;  4. Lt. Lower Leg=7/2013.     Pt. had 1st Blood Clot after [id number], & Last=2013. [place] Filter= Rt.Lateral Neck.    HTN (hypertension) 6/6/2013    Pt states that she does not have hypertension    Hypercholesteremia     Irregular heartbeat     Neuromuscular disorder     neuropathy feet    PE (pulmonary embolism) July 2013     bilat LE DVT.     Restless leg syndrome

## 2020-09-06 ENCOUNTER — HOSPITAL ENCOUNTER (EMERGENCY)
Facility: HOSPITAL | Age: 50
Discharge: HOME | End: 2020-09-06
Attending: EMERGENCY MEDICINE
Payer: MEDICAID

## 2020-09-06 ENCOUNTER — NURSE TRIAGE (OUTPATIENT)
Dept: ADMINISTRATIVE | Facility: CLINIC | Age: 50
End: 2020-09-06

## 2020-09-06 VITALS
WEIGHT: 230 LBS | DIASTOLIC BLOOD PRESSURE: 69 MMHG | HEART RATE: 87 BPM | OXYGEN SATURATION: 95 % | SYSTOLIC BLOOD PRESSURE: 151 MMHG | RESPIRATION RATE: 20 BRPM | TEMPERATURE: 98 F | BODY MASS INDEX: 36.96 KG/M2 | HEIGHT: 66 IN

## 2020-09-06 DIAGNOSIS — R10.84 GENERALIZED ABDOMINAL PAIN: ICD-10-CM

## 2020-09-06 DIAGNOSIS — K80.50 BILIARY COLIC: Primary | ICD-10-CM

## 2020-09-06 LAB
ALBUMIN SERPL BCP-MCNC: 3.5 G/DL (ref 3.5–5.2)
ALP SERPL-CCNC: 80 U/L (ref 55–135)
ALT SERPL W/O P-5'-P-CCNC: 16 U/L (ref 10–44)
ANION GAP SERPL CALC-SCNC: 10 MMOL/L (ref 8–16)
AST SERPL-CCNC: 14 U/L (ref 10–40)
BACTERIA #/AREA URNS HPF: ABNORMAL /HPF
BASOPHILS # BLD AUTO: 0.12 K/UL (ref 0–0.2)
BASOPHILS NFR BLD: 0.9 % (ref 0–1.9)
BILIRUB SERPL-MCNC: 0.2 MG/DL (ref 0.1–1)
BILIRUB UR QL STRIP: NEGATIVE
BUN SERPL-MCNC: 12 MG/DL (ref 6–20)
CALCIUM SERPL-MCNC: 8.7 MG/DL (ref 8.7–10.5)
CHLORIDE SERPL-SCNC: 101 MMOL/L (ref 95–110)
CLARITY UR: CLEAR
CO2 SERPL-SCNC: 27 MMOL/L (ref 23–29)
COLOR UR: ABNORMAL
CREAT SERPL-MCNC: 0.6 MG/DL (ref 0.5–1.4)
DIFFERENTIAL METHOD: ABNORMAL
EOSINOPHIL # BLD AUTO: 0.7 K/UL (ref 0–0.5)
EOSINOPHIL NFR BLD: 5.1 % (ref 0–8)
ERYTHROCYTE [DISTWIDTH] IN BLOOD BY AUTOMATED COUNT: 14.6 % (ref 11.5–14.5)
EST. GFR  (AFRICAN AMERICAN): >60 ML/MIN/1.73 M^2
EST. GFR  (NON AFRICAN AMERICAN): >60 ML/MIN/1.73 M^2
GLUCOSE SERPL-MCNC: 92 MG/DL (ref 70–110)
GLUCOSE UR QL STRIP: NEGATIVE
HCT VFR BLD AUTO: 36.8 % (ref 37–48.5)
HGB BLD-MCNC: 11.7 G/DL (ref 12–16)
HGB UR QL STRIP: NEGATIVE
HYALINE CASTS #/AREA URNS LPF: 0 /LPF
IMM GRANULOCYTES # BLD AUTO: 0.04 K/UL (ref 0–0.04)
IMM GRANULOCYTES NFR BLD AUTO: 0.3 % (ref 0–0.5)
KETONES UR QL STRIP: NEGATIVE
LEUKOCYTE ESTERASE UR QL STRIP: ABNORMAL
LIPASE SERPL-CCNC: 29 U/L (ref 4–60)
LYMPHOCYTES # BLD AUTO: 5.4 K/UL (ref 1–4.8)
LYMPHOCYTES NFR BLD: 39.1 % (ref 18–48)
MCH RBC QN AUTO: 29 PG (ref 27–31)
MCHC RBC AUTO-ENTMCNC: 31.8 G/DL (ref 32–36)
MCV RBC AUTO: 91 FL (ref 82–98)
MICROSCOPIC COMMENT: ABNORMAL
MONOCYTES # BLD AUTO: 1.2 K/UL (ref 0.3–1)
MONOCYTES NFR BLD: 8.4 % (ref 4–15)
NEUTROPHILS # BLD AUTO: 6.4 K/UL (ref 1.8–7.7)
NEUTROPHILS NFR BLD: 46.2 % (ref 38–73)
NITRITE UR QL STRIP: NEGATIVE
NRBC BLD-RTO: 0 /100 WBC
PH UR STRIP: 5 [PH] (ref 5–8)
PLATELET # BLD AUTO: 492 K/UL (ref 150–350)
PMV BLD AUTO: 10.1 FL (ref 9.2–12.9)
POTASSIUM SERPL-SCNC: 4.3 MMOL/L (ref 3.5–5.1)
PROT SERPL-MCNC: 6.4 G/DL (ref 6–8.4)
PROT UR QL STRIP: ABNORMAL
RBC # BLD AUTO: 4.04 M/UL (ref 4–5.4)
RBC #/AREA URNS HPF: 0 /HPF (ref 0–4)
SODIUM SERPL-SCNC: 138 MMOL/L (ref 136–145)
SP GR UR STRIP: 1.02 (ref 1–1.03)
URN SPEC COLLECT METH UR: ABNORMAL
UROBILINOGEN UR STRIP-ACNC: NEGATIVE EU/DL
WBC # BLD AUTO: 13.77 K/UL (ref 3.9–12.7)
WBC #/AREA URNS HPF: 3 /HPF (ref 0–5)

## 2020-09-06 PROCEDURE — 81000 URINALYSIS NONAUTO W/SCOPE: CPT

## 2020-09-06 PROCEDURE — 83690 ASSAY OF LIPASE: CPT

## 2020-09-06 PROCEDURE — 25000003 PHARM REV CODE 250: Performed by: EMERGENCY MEDICINE

## 2020-09-06 PROCEDURE — 25500020 PHARM REV CODE 255: Performed by: EMERGENCY MEDICINE

## 2020-09-06 PROCEDURE — 99285 EMERGENCY DEPT VISIT HI MDM: CPT | Mod: 25

## 2020-09-06 PROCEDURE — 85025 COMPLETE CBC W/AUTO DIFF WBC: CPT

## 2020-09-06 PROCEDURE — 80053 COMPREHEN METABOLIC PANEL: CPT

## 2020-09-06 RX ORDER — ONDANSETRON 4 MG/1
4 TABLET, ORALLY DISINTEGRATING ORAL EVERY 6 HOURS PRN
Qty: 20 TABLET | Refills: 0 | Status: SHIPPED | OUTPATIENT
Start: 2020-09-06 | End: 2020-10-15

## 2020-09-06 RX ORDER — SODIUM CHLORIDE 9 MG/ML
1000 INJECTION, SOLUTION INTRAVENOUS
Status: COMPLETED | OUTPATIENT
Start: 2020-09-06 | End: 2020-09-06

## 2020-09-06 RX ORDER — HYDROCODONE BITARTRATE AND ACETAMINOPHEN 5; 325 MG/1; MG/1
1 TABLET ORAL EVERY 8 HOURS PRN
Qty: 9 TABLET | Refills: 0 | Status: SHIPPED | OUTPATIENT
Start: 2020-09-06 | End: 2020-09-08 | Stop reason: SDUPTHER

## 2020-09-06 RX ORDER — OXYCODONE AND ACETAMINOPHEN 5; 325 MG/1; MG/1
1 TABLET ORAL
Status: COMPLETED | OUTPATIENT
Start: 2020-09-06 | End: 2020-09-06

## 2020-09-06 RX ADMIN — SODIUM CHLORIDE 1000 ML: 0.9 INJECTION, SOLUTION INTRAVENOUS at 10:09

## 2020-09-06 RX ADMIN — IOHEXOL 100 ML: 350 INJECTION, SOLUTION INTRAVENOUS at 10:09

## 2020-09-06 RX ADMIN — OXYCODONE HYDROCHLORIDE AND ACETAMINOPHEN 1 TABLET: 5; 325 TABLET ORAL at 07:09

## 2020-09-06 NOTE — TELEPHONE ENCOUNTER
Reason for Disposition   [1] SEVERE pain (e.g., excruciating) AND [2] present > 1 hour    Additional Information   Negative: Shock suspected (e.g., cold/pale/clammy skin, too weak to stand, low BP, rapid pulse)   Negative: Difficult to awaken or acting confused (e.g., disoriented, slurred speech)   Negative: Passed out (i.e., lost consciousness, collapsed and was not responding)   Negative: Sounds like a life-threatening emergency to the triager    Protocols used: ABDOMINAL PAIN - FEMALE-A-AH    Patient called with complaint of severe abdominal pain and constipation.. she was diagnosed with gallbladerr disorder and gall stones on yesterday in the ED , but the pain won't let up. She was advised to have someone drive her to the ED now and she verbalized understanding.

## 2020-09-06 NOTE — ED PROVIDER NOTES
"Encounter Date: 9/6/2020    SCRIBE #1 NOTE: I, Angela Maria Elena, am scribing for, and in the presence of,  Silvina Brooks MD. I have scribed the following portions of the note - Other sections scribed: HPI, ROS, PE.       History     Chief Complaint   Patient presents with    Abdominal Pain     x 12 days ago went to ED on [address] and was told she has gallstones    Back Pain     This is a 50 year old female with PMHx of hypercholesteremia, sciatica, bilateral LE DVT who presents to the ED c/o constant, lower left back pain radiating around to the lower right abdomen ongoing for 12 days. Patient visited [facility] Emergency Department 1 day ago and states she was diagnosed with gallstones. She notes taking tramadol, flexeril, and Toradol with no improvement. Patient noticed a decrease in appetite and states that "food makes me nauseous." Patient also complains of difficulty in having a bowel movement and reports her last bowel movement was 4 days ago. She also states she has difficulty urinating and notes a decrease in urine output. Denies fever or dysuria. Patient is allergic to morphine and penicillins.    The history is provided by the patient.     Review of patient's allergies indicates:   Allergen Reactions    Morphine Other (See Comments)     Patient had a psychotic episode after taking Morphine  Agitation, hallucinations    Penicillins Anaphylaxis     itching     Past Medical History:   Diagnosis Date    ADHD (attention deficit hyperactivity disorder)     Arthritis     Asthma     Bipolar 1 disorder     Cataract     COPD (chronic obstructive pulmonary disease)     Coronary artery disease     A fib    Depression     bipolar manic depresson    Diabetes mellitus     DVT of lower extremity, bilateral July 2013    bilateral LE DVT. [place] filter placed.     Encounter for blood transfusion     History of blood clots 1. Left Leg=2003; 2.Bilateral Groin=Blood Clots= 5 or 6/ 2013 & 7/2013; 3. LLL of " "Lung=2013;  4. Lt. Lower Leg=2013.     Pt. had 1st Blood Clot after Aolqqaghkvnj=4656, & Last=. [place] Filter= Rt.Lateral Neck.    HTN (hypertension) 2013    Pt states that she does not have hypertension    Hypercholesteremia     Irregular heartbeat     Neuromuscular disorder     neuropathy feet    PE (pulmonary embolism) 2013     bilat LE DVT.     Restless leg syndrome      Past Surgical History:   Procedure Laterality Date    ABDOMINAL SURGERY      BILATERAL OOPHORECTOMY Bilateral 2015    Green' s filter Right 2012    Right Neck & Tunneled Down.    HERNIA REPAIR      "[place] [facility] Hern[facility] [employer] around th [place]ly Button.", pt. states    OOPHORECTOMY      OVARIAN CYST REMOVAL  3/13/2014    [place] REMOVAL OF OVARY/TUBE(S)      SPLENECTOMY, TOTAL  2003    TONSILLECTOMY      as a child    TYMPANOSTOMY TUBE PLACEMENT  1976    VEIN SURGERY      Lt leg     Family History   Problem Relation Age of Onset    Hypertension Father     Diabetes Father     Heart disease Father     Cataracts Father     Diabetes Paternal Grandfather     Heart disease Paternal Grandfather     No Known Problems Mother     Ovarian cancer Maternal Grandmother          from this. ? age     No Known Problems Sister     No Known Problems Brother     No Known Problems Maternal Aunt     No Known Problems Maternal Uncle     No Known Problems Paternal Aunt     No Known Problems Paternal Uncle     No Known Problems Maternal Grandfather     Ovarian cancer Paternal Grandmother     Uterine cancer Neg Hx     Breast cancer Neg Hx     Colon cancer Neg Hx     Amblyopia Neg Hx     Blindness Neg Hx     Cancer Neg Hx     Glaucoma Neg Hx     Macular degeneration Neg Hx     Retinal detachment Neg Hx     Strabismus Neg Hx     Stroke Neg Hx     Thyroid disease Neg Hx      Social History     Tobacco Use    Smoking status: Current Every Day Smoker     Packs/day: 0.50     Years: 25.00     Pack " years: 12.50     Types: Cigarettes    Smokeless tobacco: Never Used    Tobacco comment: 8/27/20 down to 2 cigarettes   Substance Use Topics    Alcohol use: No     Alcohol/week: 0.0 standard drinks    Drug use: No     Review of Systems   Constitutional: Positive for appetite change. Negative for fever.   HENT: Negative for sore throat.    Respiratory: Negative for shortness of breath.    Cardiovascular: Negative for chest pain.   Gastrointestinal: Positive for abdominal pain, constipation and nausea.   Genitourinary: Positive for decreased urine volume and difficulty urinating. Negative for dysuria.   Musculoskeletal: Positive for back pain.   Skin: Negative for rash.   Neurological: Negative for weakness.   Hematological: Does not bruise/bleed easily.       Physical Exam     Initial Vitals [09/06/20 0654]   BP Pulse Resp Temp SpO2   (!) 144/70 80 20 98.7 °F (37.1 °C) 98 %      MAP       --         Physical Exam    Nursing note and vitals reviewed.  Constitutional: She is not diaphoretic. No distress.   HENT:   Head: Normocephalic and atraumatic.   Mouth/Throat: Oropharynx is clear and moist.   Eyes: EOM are normal. Pupils are equal, round, and reactive to light. No scleral icterus.   Neck: Normal range of motion. Neck supple. No JVD present.   Cardiovascular: Normal rate, regular rhythm and intact distal pulses.   Pulmonary/Chest: Breath sounds normal. No stridor. No respiratory distress.   Abdominal: Soft. Bowel sounds are normal. She exhibits no distension and no mass. There is abdominal tenderness in the right upper quadrant and epigastric area. There is CVA tenderness. There is no rebound and no guarding.   Well healed surgical abdominal scars. Epigastric and RUQ tenderness. Left CVA tenderness.   Musculoskeletal: Normal range of motion. No tenderness or edema.   Neurological: She is alert. She has normal strength. No cranial nerve deficit or sensory deficit. GCS score is 15. GCS eye subscore is 4. GCS  verbal subscore is 5. GCS motor subscore is 6.   Skin: Skin is warm and dry.   Psychiatric: She has a normal mood and affect.         ED Course   Procedures  Labs Reviewed   CBC W/ AUTO DIFFERENTIAL   COMPREHENSIVE METABOLIC PANEL   LIPASE   URINALYSIS, REFLEX TO URINE CULTURE          Imaging Results    None          Medical Decision Making:   History:   Old Medical Records: I decided to obtain old medical records.  Differential Diagnosis:   My differential diagnosis includes cholelithiasis, cholecystitis, vertical disc disease, GERD, gastritis, constipation, and diverticulitis.   Clinical Tests:   Lab Tests: Ordered and Reviewed  Radiological Study: Ordered and Reviewed            Scribe Attestation:   Scribe #1: I performed the above scribed service and the documentation accurately describes the services I performed. I attest to the accuracy of the note.                          Clinical Impression:   No diagnosis found.              I, Silvian Brooks , personally performed the services described in this documentation. All medical record entries made by the scribe were at my direction and in my presence. I have reviewed the chart and agree that the record reflects my personal performance and is accurate and complete.               Silvina Brooks MD  09/06/20 1102

## 2020-09-06 NOTE — ED TRIAGE NOTES
Pt presents to the ED c/o abdominal pain x 12 days. Pt states she went to [facility] hour clinic in [place] yesterday evening at 1900 where they did an ultrasound and diagnosed her with gallstones. Pt states she was given tramadol and to follow up with her PCP. Pt states tramadol didn't help with the pain so she came here. Pt states pain is on the left side of the abdomen and back.

## 2020-09-06 NOTE — ED PROVIDER NOTES
Encounter Date: 9/5/2020    SCRIBE #1 NOTE: I, Rhona Steen, am scribing for, and in the presence of,  Dr. Morelos. I have scribed the following portions of the note - Other sections scribed: HPI, ROS, PE.       History     Chief Complaint   Patient presents with    Back Pain     pt presenting with co left lower back pain x 11 days, she states the pain is radiating around to her left abdomen. pt states she has taken different  types of medication ,muscle relaxers ( Fkexeril and meloxican. Tylenol, Ibuprofen, Aleve, Lidaocain and nothiing is working     Audrey Natarajan is a 50 y.o. female who presents to the ED complaining of lower left back pain x 11 days. Reports pain is radiating around to her left abdomen. States she has taken different medications, including muscle relaxers (flexeril and meloxican), tylenol, ibuprofen, aleve, and lidocaine without relief.    The history is provided by the patient. No  was used.     Review of patient's allergies indicates:   Allergen Reactions    Morphine Other (See Comments)     Patient had a psychotic episode after taking Morphine  Agitation, hallucinations    Penicillins Anaphylaxis     itching     Past Medical History:   Diagnosis Date    ADHD (attention deficit hyperactivity disorder)     Arthritis     Asthma     Bipolar 1 disorder     Cataract     COPD (chronic obstructive pulmonary disease)     Coronary artery disease     A fib    Depression     bipolar manic depresson    Diabetes mellitus     DVT of lower extremity, bilateral July 2013    bilateral LE DVT. [place] filter placed.     Encounter for blood transfusion     History of blood clots 1. Left Leg=2003; 2.Bilateral Groin=Blood Clots= 5 or 6/ 2013 & 7/2013; 3. LLL of Lung=7/2013;  4. Lt. Lower Leg=7/2013.     Pt. had 1st Blood Clot after [id number]=6026, & Last=2013. [place] Filter= Rt.Lateral Neck.    HTN (hypertension) 6/6/2013    Pt states that she does not have  "hypertension    Hypercholesteremia     Irregular heartbeat     Neuromuscular disorder     neuropathy feet    PE (pulmonary embolism) 2013     bilat LE DVT.     Restless leg syndrome      Past Surgical History:   Procedure Laterality Date    ABDOMINAL SURGERY      BILATERAL OOPHORECTOMY Bilateral 2015    Green' s filter Right 2012    Right Neck & Tunneled Down.    HERNIA REPAIR      "[place] [facility] Hernias Repaires around th [place].", pt. states    OOPHORECTOMY      OVARIAN CYST REMOVAL  3/13/2014    [place] REMOVAL OF OVARY/TUBE(S)      SPLENECTOMY, TOTAL  2003    TONSILLECTOMY      as a child    TYMPANOSTOMY TUBE PLACEMENT  1976    VEIN SURGERY      Lt leg     Family History   Problem Relation Age of Onset    Hypertension Father     Diabetes Father     Heart disease Father     Cataracts Father     Diabetes Paternal Grandfather     Heart disease Paternal Grandfather     No Known Problems Mother     Ovarian cancer Maternal Grandmother          from this. ? age     No Known Problems Sister     No Known Problems Brother     No Known Problems Maternal Aunt     No Known Problems Maternal Uncle     No Known Problems Paternal Aunt     No Known Problems Paternal Uncle     No Known Problems Maternal Grandfather     Ovarian cancer Paternal Grandmother     Uterine cancer Neg Hx     Breast cancer Neg Hx     Colon cancer Neg Hx     Amblyopia Neg Hx     Blindness Neg Hx     Cancer Neg Hx     Glaucoma Neg Hx     Macular degeneration Neg Hx     Retinal detachment Neg Hx     Strabismus Neg Hx     Stroke Neg Hx     Thyroid disease Neg Hx      Social History     Tobacco Use    Smoking status: Current Every Day Smoker     Packs/day: 0.50     Years: 25.00     Pack years: 12.50     Types: Cigarettes    Smokeless tobacco: Never Used    Tobacco comment: 20 down to 2 cigarettes   Substance Use Topics    Alcohol use: No     Alcohol/week: 0.0 standard drinks    Drug " use: No     Review of Systems   Constitutional: Negative for fever.   HENT: Negative for sore throat.    Respiratory: Negative for shortness of breath.    Cardiovascular: Negative for chest pain.   Gastrointestinal: Positive for abdominal pain. Negative for nausea and vomiting.   Genitourinary: Negative for dysuria.   Musculoskeletal: Positive for back pain.   Skin: Negative for rash.   Neurological: Negative for weakness.   Hematological: Negative for adenopathy.   Psychiatric/Behavioral: Negative for behavioral problems.   All other systems reviewed and are negative.      Physical Exam     Initial Vitals [09/05/20 1855]   BP Pulse Resp Temp SpO2   128/74 93 18 97.9 °F (36.6 °C) 98 %      MAP       --         Physical Exam    Nursing note and vitals reviewed.  Constitutional: She appears well-developed and well-nourished.   HENT:   Head: Normocephalic and atraumatic.   Eyes: Conjunctivae are normal.   Neck: Normal range of motion. Neck supple.   Cardiovascular: Normal rate, regular rhythm and normal heart sounds. Exam reveals no gallop and no friction rub.    No murmur heard.  Pulmonary/Chest: Breath sounds normal. No respiratory distress. She has no wheezes. She has no rhonchi. She has no rales.   Abdominal: Soft. There is no abdominal tenderness.   Musculoskeletal: Normal range of motion. No edema.   Neurological: She is alert and oriented to person, place, and time. GCS score is 15. GCS eye subscore is 4. GCS verbal subscore is 5. GCS motor subscore is 6.   Skin: Skin is warm and dry.   Psychiatric: She has a normal mood and affect.         ED Course   Procedures  Labs Reviewed   POCT URINALYSIS W/O SCOPE - Abnormal; Notable for the following components:       Result Value    Glucose, UA Trace (*)     All other components within normal limits   POCT CMP - Abnormal; Notable for the following components:    POC Glucose 156 (*)     All other components within normal limits   POCT LIVER PANEL - Abnormal; Notable for  the following components:    POC  (*)     All other components within normal limits   POCT URINALYSIS W/O SCOPE   POCT CBC   POCT CMP   POCT LIVER PANEL              Imaging Results          US Abdomen Limited (Final result)  Result time 09/05/20 21:44:00    Final result by Halle Gruber MD (09/05/20 21:44:00)                 Impression:      Prominent right upper quadrant shadowing.  Differential considerations may include a KAMI sign or cholecystectomy.  Recommend correlation with patient's known surgical history.    Hepatic steatosis.  Hepatomegaly.      Electronically signed by: Halle Gruber  Date:    09/05/2020  Time:    21:44             Narrative:    EXAMINATION:  ULTRASOUND ABDOMEN LIMITED    CLINICAL HISTORY:  Bilateral upper abdominal pain;    TECHNIQUE:  Limited ultrasound of the right upper quadrant of the abdomen (including pancreas, liver, gallbladder, common bile duct, and spleen) was performed.    COMPARISON:  CT abdomen pelvis with contrast from 11/28/2015    FINDINGS:  Liver: Increased in size measuring 22 cm.  Increased in echotexture.  No focal hepatic lesions.    Gallbladder: There is prominent shadowing in the right upper quadrant.  This finding may be seen in a gallbladder full of gallstones or post cholecystectomy.  Recommend correlation with patient's known surgical history.    Biliary system: The common duct is not dilated, measuring 3.5 mm.  No intrahepatic ductal dilatation.    Spleen: The spleen is surgically absent.  A 1.8 x 2.1 x 2.0 cm splenule is seen in the left upper quadrant.    Miscellaneous: No upper abdominal ascites.                                 Medical Decision Making:   History:   Old Medical Records: I decided to obtain old medical records.  Initial Assessment:   50 y.o. female who presents to the ED complaining of lower left back pain x 11 days.  Clinical Tests:   Lab Tests: Ordered and Reviewed  Radiological Study: Ordered and Reviewed  ED  Management:  CBC reveals elevated white blood cell count.  CMP reveals elevated GGT.  Ultrasound of abdomen reveals evidence of cholelithiasis without cholecystitis.  Patient was given instructions for cholelithiasis and received Toradol in the emergency department.  She states pain was relieved and she also was given a prescription for Toradol.  She was advised to follow up with her primary care physician within the next 3 days for re-evaluation/return to the emergency department if condition worsens.            Scribe Attestation:   Scribe #1: I performed the above scribed service and the documentation accurately describes the services I performed. I attest to the accuracy of the note.    This document was produced by a scribe under my direction and in my presence. I agree with the content of the note and have made any necessary edits.     Dr. Morelos    09/06/2020 4:35 AM                        Clinical Impression:     1. Calculus of gallbladder without cholecystitis without obstruction                ED Disposition Condition    Discharge Stable        ED Prescriptions     Medication Sig Dispense Start Date End Date Auth. Provider    ketorolac (TORADOL) 10 mg tablet Take 1 tablet (10 mg total) by mouth 3 (three) times daily as needed for Pain. 10 tablet 9/5/2020  Yuri Morelos MD        Follow-up Information     Follow up With Specialties Details Why Contact Info    Donaldo Pena MD Internal Medicine, Wound Care Schedule an appointment as soon as possible for a visit in 3 days For reevaluation [address] [facility] [address]  [phone]                                       Yuri Morelos MD  09/06/20 7655

## 2020-09-08 ENCOUNTER — TELEPHONE (OUTPATIENT)
Dept: FAMILY MEDICINE | Facility: CLINIC | Age: 50
End: 2020-09-08

## 2020-09-08 ENCOUNTER — OFFICE VISIT (OUTPATIENT)
Dept: SURGERY | Facility: CLINIC | Age: 50
End: 2020-09-08
Payer: MEDICAID

## 2020-09-08 ENCOUNTER — OFFICE VISIT (OUTPATIENT)
Dept: FAMILY MEDICINE | Facility: CLINIC | Age: 50
End: 2020-09-08
Payer: MEDICAID

## 2020-09-08 VITALS
DIASTOLIC BLOOD PRESSURE: 78 MMHG | RESPIRATION RATE: 18 BRPM | BODY MASS INDEX: 37.67 KG/M2 | WEIGHT: 234.38 LBS | TEMPERATURE: 99 F | HEIGHT: 66 IN | SYSTOLIC BLOOD PRESSURE: 138 MMHG | OXYGEN SATURATION: 97 % | HEART RATE: 82 BPM

## 2020-09-08 VITALS
HEIGHT: 66 IN | SYSTOLIC BLOOD PRESSURE: 127 MMHG | HEART RATE: 89 BPM | WEIGHT: 234.13 LBS | BODY MASS INDEX: 37.63 KG/M2 | DIASTOLIC BLOOD PRESSURE: 83 MMHG

## 2020-09-08 DIAGNOSIS — K80.20 CALCULUS OF GALLBLADDER WITHOUT CHOLECYSTITIS WITHOUT OBSTRUCTION: Primary | ICD-10-CM

## 2020-09-08 DIAGNOSIS — K80.80 BILIARY CALCULUS OF OTHER SITE WITHOUT OBSTRUCTION: Primary | ICD-10-CM

## 2020-09-08 DIAGNOSIS — R10.11 RIGHT UPPER QUADRANT PAIN: ICD-10-CM

## 2020-09-08 PROCEDURE — 99215 OFFICE O/P EST HI 40 MIN: CPT | Mod: PBBFAC,PN | Performed by: NURSE PRACTITIONER

## 2020-09-08 PROCEDURE — 99214 OFFICE O/P EST MOD 30 MIN: CPT | Mod: S$GLB,,, | Performed by: SURGERY

## 2020-09-08 PROCEDURE — 99214 OFFICE O/P EST MOD 30 MIN: CPT | Mod: S$PBB,,, | Performed by: NURSE PRACTITIONER

## 2020-09-08 PROCEDURE — 99214 PR OFFICE/OUTPT VISIT, EST, LEVL IV, 30-39 MIN: ICD-10-PCS | Mod: S$PBB,,, | Performed by: NURSE PRACTITIONER

## 2020-09-08 PROCEDURE — 99999 PR PBB SHADOW E&M-EST. PATIENT-LVL V: CPT | Mod: PBBFAC,,, | Performed by: NURSE PRACTITIONER

## 2020-09-08 PROCEDURE — 99214 PR OFFICE/OUTPT VISIT, EST, LEVL IV, 30-39 MIN: ICD-10-PCS | Mod: S$GLB,,, | Performed by: SURGERY

## 2020-09-08 PROCEDURE — 99999 PR PBB SHADOW E&M-EST. PATIENT-LVL V: ICD-10-PCS | Mod: PBBFAC,,, | Performed by: NURSE PRACTITIONER

## 2020-09-08 RX ORDER — HYDROCODONE BITARTRATE AND ACETAMINOPHEN 5; 325 MG/1; MG/1
1 TABLET ORAL EVERY 8 HOURS PRN
Qty: 21 TABLET | Refills: 0 | Status: SHIPPED | OUTPATIENT
Start: 2020-09-08 | End: 2020-10-15

## 2020-09-08 RX ORDER — LIDOCAINE HYDROCHLORIDE 20 MG/ML
10 SOLUTION OROPHARYNGEAL
Status: DISCONTINUED | OUTPATIENT
Start: 2020-09-08 | End: 2020-09-08

## 2020-09-08 RX ORDER — MAG HYDROX/ALUMINUM HYD/SIMETH 200-200-20
30 SUSPENSION, ORAL (FINAL DOSE FORM) ORAL
Status: DISCONTINUED | OUTPATIENT
Start: 2020-09-08 | End: 2020-09-08

## 2020-09-08 RX ORDER — MUPIROCIN 20 MG/G
OINTMENT TOPICAL
Status: CANCELLED | OUTPATIENT
Start: 2020-09-08

## 2020-09-08 RX ORDER — SODIUM CHLORIDE 9 MG/ML
INJECTION, SOLUTION INTRAVENOUS CONTINUOUS
Status: CANCELLED | OUTPATIENT
Start: 2020-09-08

## 2020-09-08 RX ORDER — DICYCLOMINE HYDROCHLORIDE 10 MG/5ML
20 SOLUTION ORAL
Status: DISCONTINUED | OUTPATIENT
Start: 2020-09-08 | End: 2020-09-08

## 2020-09-08 NOTE — LETTER
September 8, 2020      Donaldo Pena MD  [address] [facility] [address]           [facility], [facility]  [address], [address]  [facility] [address]  Phone: [phone]  Fax: [phone]          Patient: Audrey Natarajan   MR Number: [id number]   YOB: 1970   Date of Visit: 9/8/2020       Dear Dr. Donaldo Pena:    Thank you for referring Audrey Natarajan to me for evaluation. Attached you will find relevant portions of my assessment and plan of care.    If you have questions, please do not hesitate to call me. I look forward to following Audrey Natarajan along with you.    Sincerely,    Montrell Gutierrez MD    Enclosure  CC:  No Recipients    If you would like to receive this communication electronically, please contact [email or web address] or [phone] to request more information on [employer] Link access.    For providers and/or their staff who would like to refer a patient to [facility], please contact us through our one-stop-shop provider referral line, [facility], at [phone].    If you feel you have received this communication in error or would no longer like to receive these types of communications, please e-mail [email or web address]

## 2020-09-08 NOTE — TELEPHONE ENCOUNTER
Called pt to her know Dr. Pena didn't have appt available for today but she can see NP Richard. Pt was ok with seeing the NP. Schedule appt for 1:20 pm.

## 2020-09-08 NOTE — TELEPHONE ENCOUNTER
----- Message from Aleah Tavarez sent at 9/8/2020  7:19 AM CDT -----  Regarding: referral for gall bladder  Type:  Patient Requesting Referral    Who Called:Audrey     Referral to What Specialty:general surgery    Reason for Referral:gallbladder removal     Does the patient want the referral with a specific physician?:any [facility] surgeon [facility] the [facility]    [facility] the [facility]    Would the patient rather a call back or a response via [facility]? Call back    Best Call Back Number:[phone]    Additional Information: The patient has a referral in the system from Dr. Gasinu, [facility] doctor. The referral is to Dr. Mauro. He is no longer with [facility].

## 2020-09-08 NOTE — PROGRESS NOTES
Routine Office Visit    Patient Name: Audrey Natarajan    : 1970  MRN: [id number]    Chief Complaint:  Abdominal pain    Subjective:  Audrey is a 50 y.o. female who presents today for:    1.  Abdominal pain - patient reports today for evaluation of abdominal pain.  This has been going on for the last 16 days without any specific inciting event or injury.  She has been to the ED twice in the last few days for the symptoms.  Review of imaging from the ED shows possible gallbladder sludge with gallstones without cholecystitis.  The pain is stabbing and excruciating but intermittent.  She has been taking hydrocodone for the symptoms with partial relief, but the pain still persists.  She also reports intermittent nausea and states that the only foods she can keep down or broth and ice.  She reports diarrhea and worsening of her reflux as well.  Denies any chest pain, shortness of breath, wheezing, palpitations, fevers, or chills.  She is requesting to be referred to a surgeon to get her gallbladder removed.    Past Medical History  Past Medical History:   Diagnosis Date    ADHD (attention deficit hyperactivity disorder)     Arthritis     Asthma     Bipolar 1 disorder     Cataract     COPD (chronic obstructive pulmonary disease)     Coronary artery disease     A fib    Depression     bipolar manic depresson    Diabetes mellitus     DVT of lower extremity, bilateral 2013    bilateral LE DVT. [place] filter placed.     Encounter for blood transfusion     History of blood clots 1. Left Leg=; 2.Bilateral Groin=Blood Clots=  or 2013 & 2013; 3. LLL of Lung=2013;  4. Lt. Lower Leg=2013.     Pt. had 1st Blood Clot after [id number], & Last=. [place] Filter= Rt.Lateral Neck.    HTN (hypertension) 2013    Pt states that she does not have hypertension    Hypercholesteremia     Irregular heartbeat     Neuromuscular disorder     neuropathy feet    PE (pulmonary  "embolism) 2013     bilat LE DVT.     Restless leg syndrome        Past Surgical History  Past Surgical History:   Procedure Laterality Date    ABDOMINAL SURGERY      BILATERAL OOPHORECTOMY Bilateral 2015    Green' s filter Right 2012    Right Neck & Tunneled Down.    HERNIA REPAIR      "[place] of Hernias Repaires around th [place].", pt. states    OOPHORECTOMY      OVARIAN CYST REMOVAL  3/13/2014    VA REMOVAL OF OVARY/TUBE(S)      SPLENECTOMY, TOTAL  2003    TONSILLECTOMY      as a child    TYMPANOSTOMY TUBE PLACEMENT  1976    VEIN SURGERY      Lt leg       Family History  Family History   Problem Relation Age of Onset    Hypertension Father     Diabetes Father     Heart disease Father     Cataracts Father     Diabetes Paternal Grandfather     Heart disease Paternal Grandfather     No Known Problems Mother     Ovarian cancer Maternal Grandmother          from this. ? age     No Known Problems Sister     No Known Problems Brother     No Known Problems Maternal Aunt     No Known Problems Maternal Uncle     No Known Problems Paternal Aunt     No Known Problems Paternal Uncle     No Known Problems Maternal Grandfather     Ovarian cancer Paternal Grandmother     Uterine cancer Neg Hx     Breast cancer Neg Hx     Colon cancer Neg Hx     Amblyopia Neg Hx     Blindness Neg Hx     Cancer Neg Hx     Glaucoma Neg Hx     Macular degeneration Neg Hx     Retinal detachment Neg Hx     Strabismus Neg Hx     Stroke Neg Hx     Thyroid disease Neg Hx        Social History  Social History     Socioeconomic History    Marital status:      Spouse name: Not on file    Number of children: Not on file    Years of education: Not on file    Highest education level: Not on file   Occupational History    Not on file   Social Needs    Financial resource strain: Not on file    Food insecurity     Worry: Not on file     Inability: Not on file    Transportation " needs     Medical: Not on file     Non-medical: Not on file   Tobacco Use    Smoking status: Current Every Day Smoker     Packs/day: 0.50     Years: 25.00     Pack years: 12.50     Types: Cigarettes    Smokeless tobacco: Never Used    Tobacco comment: 8/27/20 down to 2 cigarettes   Substance and Sexual Activity    Alcohol use: No     Alcohol/week: 0.0 standard drinks    Drug use: No    Sexual activity: Not Currently   Lifestyle    Physical activity     Days per week: Not on file     Minutes per session: Not on file    Stress: Not on file   Relationships    Social connections     Talks on phone: Not on file     Gets together: Not on file     Attends Worship service: Not on file     Active member of club or organization: Not on file     Attends meetings of clubs or organizations: Not on file     Relationship status: Not on file   Other Topics Concern    Not on file   Social History Narrative    Not on file       Current Medications  Current Outpatient Medications on File Prior to Visit   Medication Sig Dispense Refill    acetaminophen (ARTHRITIS PAIN RELIEVER) 650 MG TbSR Take 650 mg by mouth. Pt states taking 2 -3 times a day      albuterol (ACCUNEB) 0.63 mg/3 mL Nebu Take 3 mLs (0.63 mg total) by nebulization every 8 (eight) hours as needed (wheezing). Rescue 1 Box 1    albuterol (PROAIR HFA) 90 mcg/actuation inhaler INHALE 2 PUFFS BY MOUTH INTO THE LUNGS EVERY 6 HOURS AS NEEDED FOR WHEEZING. RESCUE 8.5 g 1    aspirin (ECOTRIN) 81 MG EC tablet Take 81 mg by mouth once daily.       azelastine (ASTELIN) 137 mcg (0.1 %) nasal spray 1 spray (137 mcg total) by Nasal route 2 (two) times daily. 30 mL 0    b complex vitamins tablet Take 1 tablet by mouth once daily. 90 tablet 2    carbamazepine (TEGRETOL) 200 mg tablet TK 2 TS PO BID  1    ciclopirox (LOPROX) 0.77 % Susp Apply topically once daily. 30 mL 3    ciclopirox-nail lacquer removr 8 % Kit Apply 1 application topically once a week. 1 kit 4     clotrimazole (LOTRIMIN) 1 % cream Apply topically 2 (two) times daily. 28 g 1    cyanocobalamin (VITAMIN B-12) 100 MCG tablet Take 1 tablet (100 mcg total) by mouth once daily. 90 tablet 1    cyclobenzaprine (FLEXERIL) 5 MG tablet TAKE 1 TABLET(5 MG) BY MOUTH THREE TIMES DAILY AS NEEDED FOR MUSCLE SPASMS 60 tablet 0    diphenhydrAMINE (BENADRYL) 50 MG capsule Take 1 capsule (50 mg total) by mouth every 6 (six) hours as needed for Itching or Allergies (Swelling of the throat, rash and shortness of breath). 20 capsule 0    DULERA 100-5 mcg/actuation HFAA INHALE 2 PUFFS INTO THE LUNGS TWICE DAILY 13 g 2    DUPIXENT 300 mg/2 mL Syrg inject 300mg SUBCUTANEOUSLY every other week  6    fluticasone propionate (FLONASE) 50 mcg/actuation nasal spray SHAKE LIQUID AND USE 1 SPRAY(50 MCG) IN EACH NOSTRIL TWICE DAILY 16 g 3    furosemide (LASIX) 20 MG tablet TAKE 1 TABLET(20 MG) BY MOUTH EVERY DAY 90 tablet 2    gabapentin (NEURONTIN) 600 MG tablet TAKE 1 TABLET(600 MG) BY MOUTH TWICE DAILY 180 tablet 2    hydrOXYzine pamoate (VISTARIL) 25 MG Cap       ketorolac (TORADOL) 10 mg tablet Take 1 tablet (10 mg total) by mouth 3 (three) times daily as needed for Pain. 10 tablet 0    lancets Misc To check BG once daily, to use with insurance preferred meter 30 each 2    lidocaine 3 % Crea Rub on lower back for pain as needed no more than 3 times a day 1 Tube 0    lisinopriL (PRINIVIL,ZESTRIL) 5 MG tablet TAKE 1 TABLET(5 MG) BY MOUTH EVERY DAY 90 tablet 2    loratadine (CLARITIN) 10 mg tablet Take 1 tablet (10 mg total) by mouth once daily. 30 tablet 5    meloxicam (MOBIC) 15 MG tablet TAKE 1 TABLET(15 MG) BY MOUTH EVERY DAY 30 tablet 2    metFORMIN (GLUCOPHAGE) 1000 MG tablet TAKE 1 TABLET(1000 MG) BY MOUTH TWICE DAILY WITH MEALS 180 tablet 2    methylPREDNISolone (MEDROL DOSEPACK) 4 mg tablet use as directed 1 Package 0    metoprolol tartrate (LOPRESSOR) 50 MG tablet Take 1 tablet (50 mg total) by mouth 2 (two) times  daily. 60 tablet 2    mometasone-formoterol (DULERA) 200-5 mcg/actuation inhaler Inhale 1 puff into the lungs 2 (two) times daily. 8.8 g 1    multivitamin (THERAGRAN) per tablet Take 1 tablet by mouth every morning. 90 tablet 2    nicotine polacrilex 2 MG Lozg 1-2 per hour in place of cigarettes maximum of 20 per day. 168 lozenge 0    NYSTOP powder APPLY TO AFFECTED AREA TWICE DAILY 60 g 2    ondansetron (ZOFRAN) 4 MG tablet Take 1 tablet (4 mg total) by mouth every 8 (eight) hours as needed for Nausea. 30 tablet 0    ondansetron (ZOFRAN-ODT) 4 MG TbDL Take 1 tablet (4 mg total) by mouth every 6 (six) hours as needed (Nausea or vomiting). 20 tablet 0    pantoprazole (PROTONIX) 40 MG tablet TAKE 1 TABLET(40 MG) BY MOUTH EVERY DAY 30 tablet 5    pravastatin (PRAVACHOL) 40 MG tablet TAKE 1 TABLET(40 MG) BY MOUTH EVERY DAY 90 tablet 2    risperidone (RISPERDAL) 3 MG Tab take at bedtime  1    SITagliptin (JANUVIA) 100 MG Tab Take 1 tablet (100 mg total) by mouth once daily. 90 tablet 0    VYVANSE 50 mg capsule TK ONE C PO QAM  0    [DISCONTINUED] HYDROcodone-acetaminophen (NORCO) 5-325 mg per tablet Take 1 tablet by mouth every 8 (eight) hours as needed (Severe pain not controlled by other medications). 9 tablet 0    blood-glucose meter kit To check BG once daily, to use with insurance preferred meter 1 each 0    famotidine (PEPCID) 20 MG tablet Take 1 tablet (20 mg total) by mouth 2 (two) times daily. (Patient not taking: Reported on 9/8/2020) 10 tablet 0    fish oil-omega-3 fatty acids 300-1,000 mg capsule Take 2 capsules by mouth once daily. Instructed to stop 5-7 days before surgery (8/11/16). (Patient not taking: Reported on 9/8/2020) 60 capsule 5     No current facility-administered medications on file prior to visit.        Allergies   Review of patient's allergies indicates:   Allergen Reactions    Morphine Other (See Comments)     Patient had a psychotic episode after taking Morphine  Agitation,  "hallucinations    Penicillins Anaphylaxis     itching       Review of Systems (Pertinent positives)  Review of Systems   Constitutional: Negative for chills, diaphoresis, fever, malaise/fatigue and weight loss.   HENT: Negative.    Eyes: Negative.    Respiratory: Negative.    Cardiovascular: Negative for chest pain, palpitations, orthopnea, claudication, leg swelling and PND.   Gastrointestinal: Positive for abdominal pain, diarrhea, heartburn, nausea and vomiting. Negative for blood in stool, constipation and melena.   Genitourinary: Negative.    Musculoskeletal: Negative for back pain, falls, joint pain, myalgias and neck pain.   Skin: Negative.    Neurological: Negative.    Endo/Heme/Allergies: Negative.    Psychiatric/Behavioral: Negative.        /78 (BP Location: Right arm, Patient Position: Sitting, BP Method: Large (Manual))   Pulse 82   Temp 98.6 °F (37 °C) (Skin)   Resp 18   Ht 5' 6" (1.676 m)   Wt 106.3 kg (234 lb 5.6 oz)   LMP 11/01/2011 (LMP Unknown) Comment: stated when she was 39  SpO2 97%   BMI 37.82 kg/m²     Physical Exam  Vitals signs reviewed.   Constitutional:       General: She is not in acute distress.     Appearance: Normal appearance. She is not ill-appearing, toxic-appearing or diaphoretic.      Comments: Patient resting comfortably in examination room in no acute distress, conversing appropriately   HENT:      Head: Normocephalic and atraumatic.   Neck:      Musculoskeletal: Normal range of motion and neck supple.   Cardiovascular:      Rate and Rhythm: Normal rate and regular rhythm.      Pulses: Normal pulses.      Heart sounds: Normal heart sounds. No murmur. No gallop.    Pulmonary:      Effort: Pulmonary effort is normal. No respiratory distress.      Breath sounds: Normal breath sounds. No stridor. No wheezing, rhonchi or rales.   Chest:      Chest wall: No tenderness.   Abdominal:      Palpations: Abdomen is soft.      Tenderness: There is abdominal tenderness in the " right upper quadrant and epigastric area. There is no right CVA tenderness, left CVA tenderness, guarding or rebound. Negative signs include Oliveira's sign.      Hernia: No hernia is present.      Comments: Rounded abdomen     Skin:     General: Skin is warm and dry.      Capillary Refill: Capillary refill takes less than 2 seconds.   Neurological:      General: No focal deficit present.      Mental Status: She is alert and oriented to person, place, and time.          Assessment/Plan:  Audrey Natarajan is a 50 y.o. female who presents today for :    Audrey was seen today for cholelithiasis.    Diagnoses and all orders for this visit:    Biliary calculus of other site without obstruction  -     Ambulatory referral/consult to General Surgery; Future  -     HYDROcodone-acetaminophen (NORCO) 5-325 mg per tablet; Take 1 tablet by mouth every 8 (eight) hours as needed (Severe pain not controlled by other medications).  -     Discontinue: aluminum-magnesium hydroxide-simethicone 200-200-20 mg/5 mL suspension 30 mL  -     Discontinue: dicyclomine 10 mg/5 mL syrup 20 mg  -     Discontinue: lidocaine HCl 2% oral solution 10 mL    Right upper quadrant pain  -     HYDROcodone-acetaminophen (NORCO) 5-325 mg per tablet; Take 1 tablet by mouth every 8 (eight) hours as needed (Severe pain not controlled by other medications).  -     Discontinue: aluminum-magnesium hydroxide-simethicone 200-200-20 mg/5 mL suspension 30 mL  -     Discontinue: dicyclomine 10 mg/5 mL syrup 20 mg  -     Discontinue: lidocaine HCl 2% oral solution 10 mL     Stat referral to general surgery place.  Refilled pain medication potential side effects discussed.  Was going to give patient a GI cocktail, however patient did not want to wait that she wanted to leave the office to make it to her general surgeons appointment which was scheduled for today.  Her symptoms likely due to gallbladder calculus/sludge however her gallbladder might be inflamed  currently.  Previous CT imaging and labs reviewed.   She may need cholecystectomy.  Patient in agreement with plan of care and verbalized understanding of instructions.        This office note has been dictated.  This dictation has been generated using M-Modal Fluency Direct dictation; some phonetic errors may occur.   My collaborating physician is Dr. Champ Zhao.

## 2020-09-08 NOTE — PROGRESS NOTES
History & Physical    SUBJECTIVE:     History of Present Illness:  Patient is a 50 y.o. female presents with abdominal pain and gallstones. She states that she has been having abdominal pain for 2 weeks increasing in severity.     Chief Complaint   Patient presents with    Consult    Gall Bladder Problem       Review of patient's allergies indicates:   Allergen Reactions    Morphine Other (See Comments)     Patient had a psychotic episode after taking Morphine  Agitation, hallucinations    Penicillins Anaphylaxis     itching       Current Outpatient Medications   Medication Sig Dispense Refill    acetaminophen (ARTHRITIS PAIN RELIEVER) 650 MG TbSR Take 650 mg by mouth. Pt states taking 2 -3 times a day      albuterol (ACCUNEB) 0.63 mg/3 mL Nebu Take 3 mLs (0.63 mg total) by nebulization every 8 (eight) hours as needed (wheezing). Rescue 1 Box 1    albuterol (PROAIR HFA) 90 mcg/actuation inhaler INHALE 2 PUFFS BY MOUTH INTO THE LUNGS EVERY 6 HOURS AS NEEDED FOR WHEEZING. RESCUE 8.5 g 1    aspirin (ECOTRIN) 81 MG EC tablet Take 81 mg by mouth once daily.       azelastine (ASTELIN) 137 mcg (0.1 %) nasal spray 1 spray (137 mcg total) by Nasal route 2 (two) times daily. 30 mL 0    b complex vitamins tablet Take 1 tablet by mouth once daily. 90 tablet 2    carbamazepine (TEGRETOL) 200 mg tablet TK 2 TS PO BID  1    ciclopirox (LOPROX) 0.77 % Susp Apply topically once daily. 30 mL 3    ciclopirox-nail lacquer removr 8 % Kit Apply 1 application topically once a week. 1 kit 4    clotrimazole (LOTRIMIN) 1 % cream Apply topically 2 (two) times daily. 28 g 1    cyanocobalamin (VITAMIN B-12) 100 MCG tablet Take 1 tablet (100 mcg total) by mouth once daily. 90 tablet 1    cyclobenzaprine (FLEXERIL) 5 MG tablet TAKE 1 TABLET(5 MG) BY MOUTH THREE TIMES DAILY AS NEEDED FOR MUSCLE SPASMS 60 tablet 0    diphenhydrAMINE (BENADRYL) 50 MG capsule Take 1 capsule (50 mg total) by mouth every 6 (six) hours as needed for  Itching or Allergies (Swelling of the throat, rash and shortness of breath). 20 capsule 0    DULERA 100-5 mcg/actuation HFAA INHALE 2 PUFFS INTO THE LUNGS TWICE DAILY 13 g 2    DUPIXENT 300 mg/2 mL Syrg inject 300mg SUBCUTANEOUSLY every other week  6    fluticasone propionate (FLONASE) 50 mcg/actuation nasal spray SHAKE LIQUID AND USE 1 SPRAY(50 MCG) IN EACH NOSTRIL TWICE DAILY 16 g 3    furosemide (LASIX) 20 MG tablet TAKE 1 TABLET(20 MG) BY MOUTH EVERY DAY 90 tablet 2    gabapentin (NEURONTIN) 600 MG tablet TAKE 1 TABLET(600 MG) BY MOUTH TWICE DAILY 180 tablet 2    HYDROcodone-acetaminophen (NORCO) 5-325 mg per tablet Take 1 tablet by mouth every 8 (eight) hours as needed (Severe pain not controlled by other medications). 21 tablet 0    hydrOXYzine pamoate (VISTARIL) 25 MG Cap       ketorolac (TORADOL) 10 mg tablet Take 1 tablet (10 mg total) by mouth 3 (three) times daily as needed for Pain. 10 tablet 0    lancets Misc To check BG once daily, to use with insurance preferred meter 30 each 2    lidocaine 3 % Crea Rub on lower back for pain as needed no more than 3 times a day 1 Tube 0    lisinopriL (PRINIVIL,ZESTRIL) 5 MG tablet TAKE 1 TABLET(5 MG) BY MOUTH EVERY DAY 90 tablet 2    loratadine (CLARITIN) 10 mg tablet Take 1 tablet (10 mg total) by mouth once daily. 30 tablet 5    meloxicam (MOBIC) 15 MG tablet TAKE 1 TABLET(15 MG) BY MOUTH EVERY DAY 30 tablet 2    metFORMIN (GLUCOPHAGE) 1000 MG tablet TAKE 1 TABLET(1000 MG) BY MOUTH TWICE DAILY WITH MEALS 180 tablet 2    methylPREDNISolone (MEDROL DOSEPACK) 4 mg tablet use as directed 1 Package 0    metoprolol tartrate (LOPRESSOR) 50 MG tablet Take 1 tablet (50 mg total) by mouth 2 (two) times daily. 60 tablet 2    mometasone-formoterol (DULERA) 200-5 mcg/actuation inhaler Inhale 1 puff into the lungs 2 (two) times daily. 8.8 g 1    multivitamin (THERAGRAN) per tablet Take 1 tablet by mouth every morning. 90 tablet 2    nicotine polacrilex 2 MG  Lozg 1-2 per hour in place of cigarettes maximum of 20 per day. 168 lozenge 0    NYSTOP powder APPLY TO AFFECTED AREA TWICE DAILY 60 g 2    ondansetron (ZOFRAN) 4 MG tablet Take 1 tablet (4 mg total) by mouth every 8 (eight) hours as needed for Nausea. 30 tablet 0    ondansetron (ZOFRAN-ODT) 4 MG TbDL Take 1 tablet (4 mg total) by mouth every 6 (six) hours as needed (Nausea or vomiting). 20 tablet 0    pantoprazole (PROTONIX) 40 MG tablet TAKE 1 TABLET(40 MG) BY MOUTH EVERY DAY 30 tablet 5    pravastatin (PRAVACHOL) 40 MG tablet TAKE 1 TABLET(40 MG) BY MOUTH EVERY DAY 90 tablet 2    risperidone (RISPERDAL) 3 MG Tab take at bedtime  1    SITagliptin (JANUVIA) 100 MG Tab Take 1 tablet (100 mg total) by mouth once daily. 90 tablet 0    VYVANSE 50 mg capsule TK ONE C PO QAM  0    blood-glucose meter kit To check BG once daily, to use with insurance preferred meter 1 each 0     No current facility-administered medications for this visit.        Past Medical History:   Diagnosis Date    ADHD (attention deficit hyperactivity disorder)     Arthritis     Asthma     Bipolar 1 disorder     Cataract     COPD (chronic obstructive pulmonary disease)     Coronary artery disease     A fib    Depression     bipolar manic depresson    Diabetes mellitus     DVT of lower extremity, bilateral July 2013    bilateral LE DVT. Estelita filter placed.     Encounter for blood transfusion     History of blood clots 1. Left Leg=2003; 2.Bilateral Groin=Blood Clots= 5 or 6/ 2013 & 7/2013; 3. LLL of Lung=7/2013;  4. Lt. Lower Leg=7/2013.     Pt. had 1st Blood Clot after [id number]=1356, & Last=2013. [place] Filter= Rt.Lateral Neck.    HTN (hypertension) 6/6/2013    Pt states that she does not have hypertension    Hypercholesteremia     Irregular heartbeat     Neuromuscular disorder     neuropathy feet    PE (pulmonary embolism) July 2013     bilat LE DVT.     Restless leg syndrome      Past Surgical History:  "  Procedure Laterality Date    ABDOMINAL SURGERY      BILATERAL OOPHORECTOMY Bilateral 2015    Green' s filter Right 2012    Right Neck & Tunneled Down.    HERNIA REPAIR      "[place] of Hernias Repaires around th [place].", pt. states    OOPHORECTOMY      OVARIAN CYST REMOVAL  3/13/2014    MI REMOVAL OF OVARY/TUBE(S)      SPLENECTOMY, TOTAL  2003    TONSILLECTOMY      as a child    TYMPANOSTOMY TUBE PLACEMENT  1976    VEIN SURGERY      Lt leg     Family History   Problem Relation Age of Onset    Hypertension Father     Diabetes Father     Heart disease Father     Cataracts Father     Diabetes Paternal Grandfather     Heart disease Paternal Grandfather     No Known Problems Mother     Ovarian cancer Maternal Grandmother          from this. ? age     No Known Problems Sister     No Known Problems Brother     No Known Problems Maternal Aunt     No Known Problems Maternal Uncle     No Known Problems Paternal Aunt     No Known Problems Paternal Uncle     No Known Problems Maternal Grandfather     Ovarian cancer Paternal Grandmother     Uterine cancer Neg Hx     Breast cancer Neg Hx     Colon cancer Neg Hx     Amblyopia Neg Hx     Blindness Neg Hx     Cancer Neg Hx     Glaucoma Neg Hx     Macular degeneration Neg Hx     Retinal detachment Neg Hx     Strabismus Neg Hx     Stroke Neg Hx     Thyroid disease Neg Hx      Social History     Tobacco Use    Smoking status: Current Every Day Smoker     Packs/day: 0.50     Years: 25.00     Pack years: 12.50     Types: Cigarettes    Smokeless tobacco: Never Used    Tobacco comment: 20 down to 2 cigarettes   Substance Use Topics    Alcohol use: No     Alcohol/week: 0.0 standard drinks    Drug use: No        Review of Systems:  Review of Systems   Constitutional: Negative.    HENT: Negative.    Eyes: Negative.    Respiratory: Negative.    Cardiovascular: Negative.    Gastrointestinal: Positive for abdominal " "distention, abdominal pain, nausea and vomiting.   Endocrine: Negative.    Musculoskeletal: Negative.    Skin: Negative.    Allergic/Immunologic: Negative.    Neurological: Negative.    Hematological: Negative.    Psychiatric/Behavioral: Negative.    All other systems reviewed and are negative.      OBJECTIVE:     Vital Signs (Most Recent)  Pulse: 89 (09/08/20 1516)  BP: 127/83 (09/08/20 1516)  5' 6" (1.676 m)  106.2 kg (234 lb 2.1 oz)     Physical Exam:  Physical Exam  Vitals signs reviewed.   Constitutional:       Appearance: She is well-developed.   HENT:      Head: Normocephalic and atraumatic.      Right Ear: External ear normal.      Left Ear: External ear normal.      Nose: Nose normal.   Eyes:      Conjunctiva/sclera: Conjunctivae normal.      Pupils: Pupils are equal, round, and reactive to light.   Neck:      Musculoskeletal: Normal range of motion and neck supple.   Cardiovascular:      Rate and Rhythm: Normal rate and regular rhythm.      Heart sounds: Normal heart sounds.   Pulmonary:      Effort: Pulmonary effort is normal.      Breath sounds: Normal breath sounds.   Abdominal:      General: Bowel sounds are normal.      Palpations: Abdomen is soft.   Musculoskeletal: Normal range of motion.   Skin:     General: Skin is warm and dry.   Neurological:      Mental Status: She is alert and oriented to person, place, and time.      Deep Tendon Reflexes: Reflexes are normal and symmetric.   Psychiatric:         Behavior: Behavior normal.         Thought Content: Thought content normal.         Laboratory  none    Diagnostic Results:  CT: Reviewed  Gallstones    ASSESSMENT/PLAN:     Gallstones    PLAN:Plan     I will take her to the OR for lap misael with the risks explained       "

## 2020-09-08 NOTE — H&P (VIEW-ONLY)
History & Physical    SUBJECTIVE:     History of Present Illness:  Patient is a 50 y.o. female presents with abdominal pain and gallstones. She states that she has been having abdominal pain for 2 weeks increasing in severity.     Chief Complaint   Patient presents with    Consult    Gall Bladder Problem       Review of patient's allergies indicates:   Allergen Reactions    Morphine Other (See Comments)     Patient had a psychotic episode after taking Morphine  Agitation, hallucinations    Penicillins Anaphylaxis     itching       Current Outpatient Medications   Medication Sig Dispense Refill    acetaminophen (ARTHRITIS PAIN RELIEVER) 650 MG TbSR Take 650 mg by mouth. Pt states taking 2 -3 times a day      albuterol (ACCUNEB) 0.63 mg/3 mL Nebu Take 3 mLs (0.63 mg total) by nebulization every 8 (eight) hours as needed (wheezing). Rescue 1 Box 1    albuterol (PROAIR HFA) 90 mcg/actuation inhaler INHALE 2 PUFFS BY MOUTH INTO THE LUNGS EVERY 6 HOURS AS NEEDED FOR WHEEZING. RESCUE 8.5 g 1    aspirin (ECOTRIN) 81 MG EC tablet Take 81 mg by mouth once daily.       azelastine (ASTELIN) 137 mcg (0.1 %) nasal spray 1 spray (137 mcg total) by Nasal route 2 (two) times daily. 30 mL 0    b complex vitamins tablet Take 1 tablet by mouth once daily. 90 tablet 2    carbamazepine (TEGRETOL) 200 mg tablet TK 2 TS PO BID  1    ciclopirox (LOPROX) 0.77 % Susp Apply topically once daily. 30 mL 3    ciclopirox-nail lacquer removr 8 % Kit Apply 1 application topically once a week. 1 kit 4    clotrimazole (LOTRIMIN) 1 % cream Apply topically 2 (two) times daily. 28 g 1    cyanocobalamin (VITAMIN B-12) 100 MCG tablet Take 1 tablet (100 mcg total) by mouth once daily. 90 tablet 1    cyclobenzaprine (FLEXERIL) 5 MG tablet TAKE 1 TABLET(5 MG) BY MOUTH THREE TIMES DAILY AS NEEDED FOR MUSCLE SPASMS 60 tablet 0    diphenhydrAMINE (BENADRYL) 50 MG capsule Take 1 capsule (50 mg total) by mouth every 6 (six) hours as needed for  Itching or Allergies (Swelling of the throat, rash and shortness of breath). 20 capsule 0    DULERA 100-5 mcg/actuation HFAA INHALE 2 PUFFS INTO THE LUNGS TWICE DAILY 13 g 2    DUPIXENT 300 mg/2 mL Syrg inject 300mg SUBCUTANEOUSLY every other week  6    fluticasone propionate (FLONASE) 50 mcg/actuation nasal spray SHAKE LIQUID AND USE 1 SPRAY(50 MCG) IN EACH NOSTRIL TWICE DAILY 16 g 3    furosemide (LASIX) 20 MG tablet TAKE 1 TABLET(20 MG) BY MOUTH EVERY DAY 90 tablet 2    gabapentin (NEURONTIN) 600 MG tablet TAKE 1 TABLET(600 MG) BY MOUTH TWICE DAILY 180 tablet 2    HYDROcodone-acetaminophen (NORCO) 5-325 mg per tablet Take 1 tablet by mouth every 8 (eight) hours as needed (Severe pain not controlled by other medications). 21 tablet 0    hydrOXYzine pamoate (VISTARIL) 25 MG Cap       ketorolac (TORADOL) 10 mg tablet Take 1 tablet (10 mg total) by mouth 3 (three) times daily as needed for Pain. 10 tablet 0    lancets Misc To check BG once daily, to use with insurance preferred meter 30 each 2    lidocaine 3 % Crea Rub on lower back for pain as needed no more than 3 times a day 1 Tube 0    lisinopriL (PRINIVIL,ZESTRIL) 5 MG tablet TAKE 1 TABLET(5 MG) BY MOUTH EVERY DAY 90 tablet 2    loratadine (CLARITIN) 10 mg tablet Take 1 tablet (10 mg total) by mouth once daily. 30 tablet 5    meloxicam (MOBIC) 15 MG tablet TAKE 1 TABLET(15 MG) BY MOUTH EVERY DAY 30 tablet 2    metFORMIN (GLUCOPHAGE) 1000 MG tablet TAKE 1 TABLET(1000 MG) BY MOUTH TWICE DAILY WITH MEALS 180 tablet 2    methylPREDNISolone (MEDROL DOSEPACK) 4 mg tablet use as directed 1 Package 0    metoprolol tartrate (LOPRESSOR) 50 MG tablet Take 1 tablet (50 mg total) by mouth 2 (two) times daily. 60 tablet 2    mometasone-formoterol (DULERA) 200-5 mcg/actuation inhaler Inhale 1 puff into the lungs 2 (two) times daily. 8.8 g 1    multivitamin (THERAGRAN) per tablet Take 1 tablet by mouth every morning. 90 tablet 2    nicotine polacrilex 2 MG  Lozg 1-2 per hour in place of cigarettes maximum of 20 per day. 168 lozenge 0    NYSTOP powder APPLY TO AFFECTED AREA TWICE DAILY 60 g 2    ondansetron (ZOFRAN) 4 MG tablet Take 1 tablet (4 mg total) by mouth every 8 (eight) hours as needed for Nausea. 30 tablet 0    ondansetron (ZOFRAN-ODT) 4 MG TbDL Take 1 tablet (4 mg total) by mouth every 6 (six) hours as needed (Nausea or vomiting). 20 tablet 0    pantoprazole (PROTONIX) 40 MG tablet TAKE 1 TABLET(40 MG) BY MOUTH EVERY DAY 30 tablet 5    pravastatin (PRAVACHOL) 40 MG tablet TAKE 1 TABLET(40 MG) BY MOUTH EVERY DAY 90 tablet 2    risperidone (RISPERDAL) 3 MG Tab take at bedtime  1    SITagliptin (JANUVIA) 100 MG Tab Take 1 tablet (100 mg total) by mouth once daily. 90 tablet 0    VYVANSE 50 mg capsule TK ONE C PO QAM  0    blood-glucose meter kit To check BG once daily, to use with insurance preferred meter 1 each 0     No current facility-administered medications for this visit.        Past Medical History:   Diagnosis Date    ADHD (attention deficit hyperactivity disorder)     Arthritis     Asthma     Bipolar 1 disorder     Cataract     COPD (chronic obstructive pulmonary disease)     Coronary artery disease     A fib    Depression     bipolar manic depresson    Diabetes mellitus     DVT of lower extremity, bilateral July 2013    bilateral LE DVT. [place] filter placed.     Encounter for blood transfusion     History of blood clots 1. Left Leg=2003; 2.Bilateral Groin=Blood Clots= 5 or 6/ 2013 & 7/2013; 3. LLL of Lung=7/2013;  4. Lt. Lower Leg=7/2013.     Pt. had 1st Blood Clot after [id number], & Last=2013. [place] Filter= Rt.Lateral Neck.    HTN (hypertension) 6/6/2013    Pt states that she does not have hypertension    Hypercholesteremia     Irregular heartbeat     Neuromuscular disorder     neuropathy feet    PE (pulmonary embolism) July 2013     bilat LE DVT.     Restless leg syndrome      Past Surgical History:  "  Procedure Laterality Date    ABDOMINAL SURGERY      BILATERAL OOPHORECTOMY Bilateral 2015    [place] filter Right 2012    Right Neck & Tunneled Down.    HERNIA REPAIR      "[place] of Hernias Repaires around th Belly Button.", pt. states    OOPHORECTOMY      OVARIAN CYST REMOVAL  3/13/2014    NE REMOVAL OF OVARY/TUBE(S)      SPLENECTOMY, TOTAL  2003    TONSILLECTOMY      as a child    TYMPANOSTOMY TUBE PLACEMENT  1976    VEIN SURGERY      Lt leg     Family History   Problem Relation Age of Onset    Hypertension Father     Diabetes Father     Heart disease Father     Cataracts Father     Diabetes Paternal Grandfather     Heart disease Paternal Grandfather     No Known Problems Mother     Ovarian cancer Maternal Grandmother          from this. ? age     No Known Problems Sister     No Known Problems Brother     No Known Problems Maternal Aunt     No Known Problems Maternal Uncle     No Known Problems Paternal Aunt     No Known Problems Paternal Uncle     No Known Problems Maternal Grandfather     Ovarian cancer Paternal Grandmother     Uterine cancer Neg Hx     Breast cancer Neg Hx     Colon cancer Neg Hx     Amblyopia Neg Hx     Blindness Neg Hx     Cancer Neg Hx     Glaucoma Neg Hx     Macular degeneration Neg Hx     Retinal detachment Neg Hx     Strabismus Neg Hx     Stroke Neg Hx     Thyroid disease Neg Hx      Social History     Tobacco Use    Smoking status: Current Every Day Smoker     Packs/day: 0.50     Years: 25.00     Pack years: 12.50     Types: Cigarettes    Smokeless tobacco: Never Used    Tobacco comment: 20 down to 2 cigarettes   Substance Use Topics    Alcohol use: No     Alcohol/week: 0.0 standard drinks    Drug use: No        Review of Systems:  Review of Systems   Constitutional: Negative.    HENT: Negative.    Eyes: Negative.    Respiratory: Negative.    Cardiovascular: Negative.    Gastrointestinal: Positive for abdominal " "distention, abdominal pain, nausea and vomiting.   Endocrine: Negative.    Musculoskeletal: Negative.    Skin: Negative.    Allergic/Immunologic: Negative.    Neurological: Negative.    Hematological: Negative.    Psychiatric/Behavioral: Negative.    All other systems reviewed and are negative.      OBJECTIVE:     Vital Signs (Most Recent)  Pulse: 89 (09/08/20 1516)  BP: 127/83 (09/08/20 1516)  5' 6" (1.676 m)  106.2 kg (234 lb 2.1 oz)     Physical Exam:  Physical Exam  Vitals signs reviewed.   Constitutional:       Appearance: She is well-developed.   HENT:      Head: Normocephalic and atraumatic.      Right Ear: External ear normal.      Left Ear: External ear normal.      Nose: Nose normal.   Eyes:      Conjunctiva/sclera: Conjunctivae normal.      Pupils: Pupils are equal, round, and reactive to light.   Neck:      Musculoskeletal: Normal range of motion and neck supple.   Cardiovascular:      Rate and Rhythm: Normal rate and regular rhythm.      Heart sounds: Normal heart sounds.   Pulmonary:      Effort: Pulmonary effort is normal.      Breath sounds: Normal breath sounds.   Abdominal:      General: Bowel sounds are normal.      Palpations: Abdomen is soft.   Musculoskeletal: Normal range of motion.   Skin:     General: Skin is warm and dry.   Neurological:      Mental Status: She is alert and oriented to person, place, and time.      Deep Tendon Reflexes: Reflexes are normal and symmetric.   Psychiatric:         Behavior: Behavior normal.         Thought Content: Thought content normal.         Laboratory  none    Diagnostic Results:  CT: Reviewed  Gallstones    ASSESSMENT/PLAN:     Gallstones    PLAN:Plan     I will take her to the OR for lap misael with the risks explained       "

## 2020-09-09 ENCOUNTER — HOSPITAL ENCOUNTER (OUTPATIENT)
Dept: PREADMISSION TESTING | Facility: HOSPITAL | Age: 50
Discharge: HOME | End: 2020-09-09
Attending: SURGERY
Payer: MEDICAID

## 2020-09-09 ENCOUNTER — ANESTHESIA EVENT (OUTPATIENT)
Dept: SURGERY | Facility: HOSPITAL | Age: 50
End: 2020-09-09
Payer: MEDICAID

## 2020-09-09 VITALS
BODY MASS INDEX: 37.35 KG/M2 | RESPIRATION RATE: 18 BRPM | OXYGEN SATURATION: 96 % | SYSTOLIC BLOOD PRESSURE: 104 MMHG | WEIGHT: 232.38 LBS | TEMPERATURE: 99 F | HEART RATE: 85 BPM | DIASTOLIC BLOOD PRESSURE: 61 MMHG | HEIGHT: 66 IN

## 2020-09-09 DIAGNOSIS — Z01.818 PRE-OP TESTING: ICD-10-CM

## 2020-09-09 LAB — SARS-COV-2 RDRP RESP QL NAA+PROBE: NEGATIVE

## 2020-09-09 PROCEDURE — U0002 COVID-19 LAB TEST NON-CDC: HCPCS

## 2020-09-09 NOTE — DISCHARGE INSTRUCTIONS
Your surgery is scheduled for _Thursday Sept 10, 2020___________________.    Call [phone] between 2 p.m. and 5 p.m. on   _______________ to find out your arrival time for the day of your surgery.      Please report to SAME DAY SURGERY UNIT on the 2nd FLOOR at _______ a.m.  Use front door entrance. The doors open at 0530 am.      INSTRUCTIONS IMPORTANT!!!  ¨ Do not eat or drink after 12 midnight-including water. OK to brush teeth, no   gum, candy or mints!    ¨ Take only these medicines with a small swallow of water-morning of surgery.  Take Med's listed on list.        PLEASE call [phone] when you get home so that we can verify your medications, dosages and frequency taken. This information is needed to complete your chart for surgery.        __x__  Prep instructions: ENEMA   SHOWER   OTHER  ______________________  __x__  Please shower using Hibiclens soap the night before AND  the morning of your surgery/procedure. Do not use Hibiclens on your face or genitals          x     If your surgery is around your belly button (Navel) be sure to wash inside your   belly button also. Rinse hibiclens off completely.  __x__  No shaving of procedural area at least 4-5 days before surgery due to  increased risk of skin irritation and/or possible infection.  __x__  Do not wear makeup, including mascara. WEARING EYE MAKEUP MAY  LEAD TO SERIOUS EYE INJURY during surgery.  __x__  No powder, lotions or creams to your body.  __x__  You may wear only deodorant on the day of surgery.  __x__  Please remove all jewelry, including piercings and leave at home.  _x___  No money or valuables needed. Please leave at home.  You may bring your cell phone.  __x__  Please bring any documents given by your doctor.  __x__  If going home the same day, arrange for a ride home. You will not be able to   drive if Anesthesia was used.    _x___  Wear loose fitting clothing. Allow for dressings, bandages.  _x___  Stop Aspirin, Ibuprofen, Motrin and  Aleve at least 3-5 days before   surgery, unless otherwise instructed by your doctor, or the nurse.       x       You MAY use Tylenol/acetaminophen until day of surgery.    __x__  Call MD for temperature above 101 degrees.        __x__ Stop taking any Fish Oil supplement or any Vitamins that contain Vitamin  E at least 5 days prior to surgery.          I have read or had read and explained to me, and understand the above information.  Additional comments or instructions:Please call   [phone] if you have any questions regarding the instructions above.

## 2020-09-09 NOTE — ANESTHESIA PREPROCEDURE EVALUATION
"                                                                                                             09/09/2020  Audrey Natarajan is a 50 y.o., female scheduled for a CHOLECYSTECTOMY, LAPAROSCOPIC on 9/10/2020.    Patient has a hx of DVT, PE with [place] filter placement. She saw cards last year. Recommended LE US which was performed in 12/2019 and reapted in March 2020 which was negative. Her vascular complaints are being [facility] by [facility].    Past Medical History:   Diagnosis Date    ADHD (attention deficit hyperactivity disorder)     Arthritis     Asthma     Bipolar 1 disorder     Cataract     COPD (chronic obstructive pulmonary disease)     Coronary artery disease     A fib    Depression     bipolar manic depresson    Diabetes mellitus     DVT of lower extremity, bilateral July 2013    bilateral LE DVT. [place] filter placed.     Encounter for blood transfusion     History of blood clots 1. Left Leg=2003; 2.Bilateral Groin=Blood Clots= 5 or 6/ 2013 & 7/2013; 3. LLL of Lung=7/2013;  4. Lt. Lower Leg=7/2013.     Pt. had 1st Blood Clot after [id number]=6267, & Last=2013. [place] Filter= Rt.Lateral Neck.    HTN (hypertension) 6/6/2013    Pt states that she does not have hypertension    Hypercholesteremia     Irregular heartbeat     Neuromuscular disorder     neuropathy feet    PE (pulmonary embolism) July 2013     bilat LE DVT.     Restless leg syndrome        Past Surgical History:   Procedure Laterality Date    ABDOMINAL SURGERY      BILATERAL OOPHORECTOMY Bilateral 1/12/2015    gastric sleeve  2016    Green' s filter Right 7/4/2012    Right Neck & Tunneled Down.    HERNIA REPAIR      "[place] of Hernias Repaires around th Belly Button.", pt. states    OOPHORECTOMY      OVARIAN CYST REMOVAL  3/13/2014    IN REMOVAL OF OVARY/TUBE(S)      SPLENECTOMY, TOTAL  July 2003    TONSILLECTOMY      as a child    TYMPANOSTOMY TUBE PLACEMENT  1976    VEIN SURGERY  2003    " Lt leg     CT Abdomen/Pelvis 9/6/2020;  Impression:     No acute findings in the abdomen or pelvis.     Biliary sludge versus small gallstones.  No pericholecystic inflammatory changes.     Hepatomegaly and hepatic steatosis.     Splenectomy, sleeve gastrectomy, and IVC filter placement.     Additional incidental findings discussed in the body of the report.    US Abdomen 9/5/2020:  Impression:     Prominent right upper quadrant shadowing.  Differential considerations may include a KAMI sign or cholecystectomy.  Recommend correlation with patient's known surgical history.     Hepatic steatosis.  Hepatomegaly.    EKG 6/9/2020:  Normal sinus rhythm   Normal ECG   When compared with ECG of 18-SEP-2019 13:22,   No significant change was found   Confirmed by Lorenzo Stuart MD ([id number]) on 6/10/2020 1:34:47 PM       ECHO 10/2/2019:  · Eccentric left ventricular hypertrophy.  · Normal left ventricular systolic function. The estimated ejection fraction is 55%  · Indeterminate left ventricular diastolic function.  · Normal right ventricular systolic function.  · Moderate left atrial enlargement.  · Mild right atrial enlargement.  · Normal central venous pressure (3 mm Hg).  · The estimated PA systolic pressure is 27 mm Hg    Anesthesia Evaluation    I have reviewed the Patient Summary Reports.    I have reviewed the Nursing Notes. I have reviewed the NPO Status.   I have reviewed the Medications.     Review of Systems  Anesthesia Hx:  No problems with previous Anesthesia  Denies Family Hx of Anesthesia complications.   Denies Personal Hx of Anesthesia complications.   Social:  Smoker Only smokes 2 cigarettes a day, trying to quit   Hematology/Oncology:  Hematology Normal   Oncology Normal     EENT/Dental:EENT/Dental Normal   Cardiovascular:   Denies Pacemaker. Hypertension  Denies Valvular problems/Murmurs.  Denies MI. CAD    Denies CABG/stent. Dysrhythmias atrial fibrillation  Denies Angina.         denies PVD hyperlipidemia   Functional Capacity good / => 4 METS, Rides a bike for 30 minutes twice a day, 4x week, lifts weight    Pulmonary:   COPD Asthma Sleep Apnea, CPAP Breathing at baseline today   Renal/:  Renal/ Normal     Hepatic/GI:   No Bowel Prep. Denies PUD. Denies Hiatal Hernia. GERD, well controlled Denies Liver Disease.  Denies Hepatitis.    Musculoskeletal:  Musculoskeletal Normal    Neurological:  Neurology Normal  Denies CVA. Denies Seizures.    Endocrine:   Diabetes, type 2 Checks sugars TID, ranges 90s-120s   Dermatological:  Skin Normal    Psych:  Psychiatric Normal           Physical Exam  General:  Obesity    Airway/Jaw/Neck:   M3  Small mouth opening but > 3cm  FROM at neck  Denies loose dentition     Chest/Lungs:  Chest/Lungs Clear    Heart/Vascular:  Heart Findings: Normal       Mental Status:  Mental Status Findings: Normal        Anesthesia Plan  Type of Anesthesia, risks & benefits discussed:  Anesthesia Type:  general  Patient's Preference:   Intra-op Monitoring Plan: standard ASA monitors  Intra-op Monitoring Plan Comments:   Post Op Pain Control Plan:   Post Op Pain Control Plan Comments:   Induction:   IV  Beta Blocker:  Patient is not currently on a Beta-Blocker (No further documentation required).       Informed Consent: Patient understands risks and agrees with Anesthesia plan.  Questions answered. Anesthesia consent signed with patient.  ASA Score: 3     Day of Surgery Review of History & Physical:  There are no significant changes.  H&P update referred to the provider.         Ready For Surgery From Anesthesia Perspective.

## 2020-09-10 ENCOUNTER — HOSPITAL ENCOUNTER (OUTPATIENT)
Facility: HOSPITAL | Age: 50
Discharge: HOME | End: 2020-09-10
Attending: SURGERY | Admitting: SURGERY
Payer: MEDICAID

## 2020-09-10 ENCOUNTER — ANESTHESIA (OUTPATIENT)
Dept: SURGERY | Facility: HOSPITAL | Age: 50
End: 2020-09-10
Payer: MEDICAID

## 2020-09-10 VITALS
RESPIRATION RATE: 18 BRPM | WEIGHT: 232.38 LBS | DIASTOLIC BLOOD PRESSURE: 66 MMHG | HEART RATE: 86 BPM | SYSTOLIC BLOOD PRESSURE: 153 MMHG | TEMPERATURE: 98 F | OXYGEN SATURATION: 98 % | BODY MASS INDEX: 37.5 KG/M2

## 2020-09-10 DIAGNOSIS — K80.12 CALCULUS OF GALLBLADDER WITH ACUTE ON CHRONIC CHOLECYSTITIS WITHOUT OBSTRUCTION: ICD-10-CM

## 2020-09-10 DIAGNOSIS — Z01.818 PRE-OP TESTING: Primary | ICD-10-CM

## 2020-09-10 DIAGNOSIS — K80.80 BILIARY CALCULUS OF OTHER SITE WITHOUT OBSTRUCTION: ICD-10-CM

## 2020-09-10 PROBLEM — K80.20 CHOLELITHIASIS: Status: ACTIVE | Noted: 2020-09-10

## 2020-09-10 LAB — POCT GLUCOSE: 110 MG/DL (ref 70–110)

## 2020-09-10 PROCEDURE — 36000709 HC OR TIME LEV III EA ADD 15 MIN: Performed by: SURGERY

## 2020-09-10 PROCEDURE — 37000009 HC ANESTHESIA EA ADD 15 MINS: Performed by: SURGERY

## 2020-09-10 PROCEDURE — 36000708 HC OR TIME LEV III 1ST 15 MIN: Performed by: SURGERY

## 2020-09-10 PROCEDURE — D9220A PRA ANESTHESIA: ICD-10-PCS | Mod: CRNA,,, | Performed by: REGISTERED NURSE

## 2020-09-10 PROCEDURE — D9220A PRA ANESTHESIA: Mod: ANES,,, | Performed by: ANESTHESIOLOGY

## 2020-09-10 PROCEDURE — D9220A PRA ANESTHESIA: ICD-10-PCS | Mod: ANES,,, | Performed by: ANESTHESIOLOGY

## 2020-09-10 PROCEDURE — 82962 GLUCOSE BLOOD TEST: CPT | Performed by: SURGERY

## 2020-09-10 PROCEDURE — 00790 ANES IPER UPR ABD NOS: CPT | Performed by: SURGERY

## 2020-09-10 PROCEDURE — 47562 LAPAROSCOPIC CHOLECYSTECTOMY: CPT | Mod: ,,, | Performed by: SURGERY

## 2020-09-10 PROCEDURE — S0020 INJECTION, BUPIVICAINE HYDRO: HCPCS | Performed by: SURGERY

## 2020-09-10 PROCEDURE — 71000015 HC POSTOP RECOV 1ST HR: Performed by: SURGERY

## 2020-09-10 PROCEDURE — 63600175 PHARM REV CODE 636 W HCPCS: Performed by: SURGERY

## 2020-09-10 PROCEDURE — D9220A PRA ANESTHESIA: Mod: CRNA,,, | Performed by: REGISTERED NURSE

## 2020-09-10 PROCEDURE — 27201423 OPTIME MED/SURG SUP & DEVICES STERILE SUPPLY: Performed by: SURGERY

## 2020-09-10 PROCEDURE — 63600175 PHARM REV CODE 636 W HCPCS: Performed by: ANESTHESIOLOGY

## 2020-09-10 PROCEDURE — 88304 TISSUE EXAM BY PATHOLOGIST: CPT | Performed by: PATHOLOGY

## 2020-09-10 PROCEDURE — 71000016 HC POSTOP RECOV ADDL HR: Performed by: SURGERY

## 2020-09-10 PROCEDURE — 47562 PR LAP,CHOLECYSTECTOMY: ICD-10-PCS | Mod: ,,, | Performed by: SURGERY

## 2020-09-10 PROCEDURE — 71000033 HC RECOVERY, INTIAL HOUR: Performed by: SURGERY

## 2020-09-10 PROCEDURE — 25000003 PHARM REV CODE 250: Performed by: SURGERY

## 2020-09-10 PROCEDURE — 25000003 PHARM REV CODE 250: Performed by: REGISTERED NURSE

## 2020-09-10 PROCEDURE — 88304 PR  SURG PATH,LEVEL III: ICD-10-PCS | Mod: 26,,, | Performed by: PATHOLOGY

## 2020-09-10 PROCEDURE — 63600175 PHARM REV CODE 636 W HCPCS: Performed by: REGISTERED NURSE

## 2020-09-10 PROCEDURE — 37000008 HC ANESTHESIA 1ST 15 MINUTES: Performed by: SURGERY

## 2020-09-10 PROCEDURE — 88304 TISSUE EXAM BY PATHOLOGIST: CPT | Mod: 26,,, | Performed by: PATHOLOGY

## 2020-09-10 RX ORDER — NEOSTIGMINE METHYLSULFATE 1 MG/ML
INJECTION, SOLUTION INTRAVENOUS
Status: DISCONTINUED | OUTPATIENT
Start: 2020-09-10 | End: 2020-09-10

## 2020-09-10 RX ORDER — SODIUM CHLORIDE, SODIUM LACTATE, POTASSIUM CHLORIDE, CALCIUM CHLORIDE 600; 310; 30; 20 MG/100ML; MG/100ML; MG/100ML; MG/100ML
INJECTION, SOLUTION INTRAVENOUS CONTINUOUS
Status: DISCONTINUED | OUTPATIENT
Start: 2020-09-10 | End: 2020-09-10 | Stop reason: HOSPADM

## 2020-09-10 RX ORDER — FENTANYL CITRATE 50 UG/ML
INJECTION, SOLUTION INTRAMUSCULAR; INTRAVENOUS
Status: DISCONTINUED | OUTPATIENT
Start: 2020-09-10 | End: 2020-09-10

## 2020-09-10 RX ORDER — ROCURONIUM BROMIDE 10 MG/ML
INJECTION, SOLUTION INTRAVENOUS
Status: DISCONTINUED | OUTPATIENT
Start: 2020-09-10 | End: 2020-09-10

## 2020-09-10 RX ORDER — ACETAMINOPHEN 10 MG/ML
INJECTION, SOLUTION INTRAVENOUS
Status: DISCONTINUED | OUTPATIENT
Start: 2020-09-10 | End: 2020-09-10

## 2020-09-10 RX ORDER — DEXAMETHASONE SODIUM PHOSPHATE 4 MG/ML
INJECTION, SOLUTION INTRA-ARTICULAR; INTRALESIONAL; INTRAMUSCULAR; INTRAVENOUS; SOFT TISSUE
Status: DISCONTINUED | OUTPATIENT
Start: 2020-09-10 | End: 2020-09-10

## 2020-09-10 RX ORDER — CEFAZOLIN SODIUM 2 G/50ML
2 SOLUTION INTRAVENOUS ONCE
Status: COMPLETED | OUTPATIENT
Start: 2020-09-10 | End: 2020-09-10

## 2020-09-10 RX ORDER — HYDROMORPHONE HYDROCHLORIDE 2 MG/ML
1 INJECTION, SOLUTION INTRAMUSCULAR; INTRAVENOUS; SUBCUTANEOUS EVERY 4 HOURS PRN
Status: DISCONTINUED | OUTPATIENT
Start: 2020-09-10 | End: 2020-09-10 | Stop reason: HOSPADM

## 2020-09-10 RX ORDER — LIDOCAINE HYDROCHLORIDE 20 MG/ML
JELLY TOPICAL
Status: DISCONTINUED | OUTPATIENT
Start: 2020-09-10 | End: 2020-09-10

## 2020-09-10 RX ORDER — ROPIVACAINE/EPI/CLONIDINE/KET 2.46-0.005
SYRINGE (ML) INJECTION ONCE
Status: COMPLETED | OUTPATIENT
Start: 2020-09-10 | End: 2020-09-10

## 2020-09-10 RX ORDER — PROPOFOL 10 MG/ML
VIAL (ML) INTRAVENOUS
Status: DISCONTINUED | OUTPATIENT
Start: 2020-09-10 | End: 2020-09-10

## 2020-09-10 RX ORDER — FENTANYL CITRATE 50 UG/ML
25 INJECTION, SOLUTION INTRAMUSCULAR; INTRAVENOUS EVERY 5 MIN PRN
Status: COMPLETED | OUTPATIENT
Start: 2020-09-10 | End: 2020-09-10

## 2020-09-10 RX ORDER — HYDROMORPHONE HYDROCHLORIDE 2 MG/ML
0.2 INJECTION, SOLUTION INTRAMUSCULAR; INTRAVENOUS; SUBCUTANEOUS EVERY 5 MIN PRN
Status: DISCONTINUED | OUTPATIENT
Start: 2020-09-10 | End: 2020-09-10 | Stop reason: HOSPADM

## 2020-09-10 RX ORDER — HYDROCODONE BITARTRATE AND ACETAMINOPHEN 5; 325 MG/1; MG/1
1 TABLET ORAL ONCE
Status: COMPLETED | OUTPATIENT
Start: 2020-09-10 | End: 2020-09-10

## 2020-09-10 RX ORDER — SODIUM CHLORIDE 9 MG/ML
INJECTION, SOLUTION INTRAVENOUS CONTINUOUS
Status: DISCONTINUED | OUTPATIENT
Start: 2020-09-10 | End: 2020-09-10 | Stop reason: HOSPADM

## 2020-09-10 RX ORDER — MUPIROCIN 20 MG/G
OINTMENT TOPICAL
Status: DISCONTINUED | OUTPATIENT
Start: 2020-09-10 | End: 2020-09-10 | Stop reason: HOSPADM

## 2020-09-10 RX ORDER — LIDOCAINE HYDROCHLORIDE 20 MG/ML
INJECTION INTRAVENOUS
Status: DISCONTINUED | OUTPATIENT
Start: 2020-09-10 | End: 2020-09-10

## 2020-09-10 RX ORDER — HYDROCODONE BITARTRATE AND ACETAMINOPHEN 5; 325 MG/1; MG/1
1 TABLET ORAL EVERY 6 HOURS PRN
Qty: 30 TABLET | Refills: 0 | Status: SHIPPED | OUTPATIENT
Start: 2020-09-10 | End: 2020-10-15

## 2020-09-10 RX ORDER — ACETAMINOPHEN 10 MG/ML
INJECTION, SOLUTION INTRAVENOUS
Status: COMPLETED
Start: 2020-09-10 | End: 2020-09-10

## 2020-09-10 RX ORDER — ACETAMINOPHEN 10 MG/ML
1000 INJECTION, SOLUTION INTRAVENOUS ONCE
Status: DISCONTINUED | OUTPATIENT
Start: 2020-09-10 | End: 2020-09-10 | Stop reason: HOSPADM

## 2020-09-10 RX ORDER — SODIUM CHLORIDE 0.9 % (FLUSH) 0.9 %
3 SYRINGE (ML) INJECTION
Status: DISCONTINUED | OUTPATIENT
Start: 2020-09-10 | End: 2020-09-10 | Stop reason: HOSPADM

## 2020-09-10 RX ORDER — AMOXICILLIN 500 MG
1 CAPSULE ORAL DAILY
Status: ON HOLD | COMMUNITY
End: 2021-02-20 | Stop reason: HOSPADM

## 2020-09-10 RX ORDER — BUPIVACAINE HYDROCHLORIDE 2.5 MG/ML
INJECTION, SOLUTION INFILTRATION; PERINEURAL
Status: DISCONTINUED | OUTPATIENT
Start: 2020-09-10 | End: 2020-09-10 | Stop reason: HOSPADM

## 2020-09-10 RX ORDER — ACETAMINOPHEN 10 MG/ML
1000 INJECTION, SOLUTION INTRAVENOUS ONCE
Status: DISCONTINUED | OUTPATIENT
Start: 2020-09-10 | End: 2020-09-10 | Stop reason: SDUPTHER

## 2020-09-10 RX ORDER — MIDAZOLAM HYDROCHLORIDE 1 MG/ML
INJECTION, SOLUTION INTRAMUSCULAR; INTRAVENOUS
Status: DISCONTINUED | OUTPATIENT
Start: 2020-09-10 | End: 2020-09-10

## 2020-09-10 RX ORDER — ONDANSETRON 2 MG/ML
INJECTION INTRAMUSCULAR; INTRAVENOUS
Status: DISCONTINUED | OUTPATIENT
Start: 2020-09-10 | End: 2020-09-10

## 2020-09-10 RX ADMIN — FENTANYL CITRATE 100 MCG: 50 INJECTION INTRAMUSCULAR; INTRAVENOUS at 12:09

## 2020-09-10 RX ADMIN — MIDAZOLAM HYDROCHLORIDE 2 MG: 1 INJECTION, SOLUTION INTRAMUSCULAR; INTRAVENOUS at 12:09

## 2020-09-10 RX ADMIN — ROCURONIUM BROMIDE 40 MG: 10 INJECTION, SOLUTION INTRAVENOUS at 12:09

## 2020-09-10 RX ADMIN — PROPOFOL 150 MG: 10 INJECTION, EMULSION INTRAVENOUS at 12:09

## 2020-09-10 RX ADMIN — ONDANSETRON 4 MG: 2 INJECTION, SOLUTION INTRAMUSCULAR; INTRAVENOUS at 12:09

## 2020-09-10 RX ADMIN — MUPIROCIN: 20 OINTMENT TOPICAL at 10:09

## 2020-09-10 RX ADMIN — FENTANYL CITRATE 50 MCG: 50 INJECTION INTRAMUSCULAR; INTRAVENOUS at 01:09

## 2020-09-10 RX ADMIN — Medication: at 01:09

## 2020-09-10 RX ADMIN — CEFAZOLIN SODIUM 2 G: 2 SOLUTION INTRAVENOUS at 12:09

## 2020-09-10 RX ADMIN — LIDOCAINE HYDROCHLORIDE 2 ML: 20 JELLY TOPICAL at 12:09

## 2020-09-10 RX ADMIN — GLYCOPYRROLATE 0.8 MG: 0.2 INJECTION, SOLUTION INTRAMUSCULAR; INTRAVITREAL at 01:09

## 2020-09-10 RX ADMIN — NEOSTIGMINE METHYLSULFATE 5 MG: 1 INJECTION INTRAVENOUS at 01:09

## 2020-09-10 RX ADMIN — FENTANYL CITRATE 25 MCG: 50 INJECTION, SOLUTION INTRAMUSCULAR; INTRAVENOUS at 01:09

## 2020-09-10 RX ADMIN — HYDROCODONE BITARTRATE AND ACETAMINOPHEN 1 TABLET: 5; 325 TABLET ORAL at 03:09

## 2020-09-10 RX ADMIN — ACETAMINOPHEN 1000 MG: 10 INJECTION, SOLUTION INTRAVENOUS at 12:09

## 2020-09-10 RX ADMIN — SODIUM CHLORIDE: 0.9 INJECTION, SOLUTION INTRAVENOUS at 10:09

## 2020-09-10 RX ADMIN — ROCURONIUM BROMIDE 10 MG: 10 INJECTION, SOLUTION INTRAVENOUS at 12:09

## 2020-09-10 RX ADMIN — PROPOFOL 50 MG: 10 INJECTION, EMULSION INTRAVENOUS at 12:09

## 2020-09-10 RX ADMIN — FENTANYL CITRATE 50 MCG: 50 INJECTION INTRAMUSCULAR; INTRAVENOUS at 12:09

## 2020-09-10 RX ADMIN — DEXAMETHASONE SODIUM PHOSPHATE 4 MG: 4 INJECTION, SOLUTION INTRAMUSCULAR; INTRAVENOUS at 12:09

## 2020-09-10 RX ADMIN — Medication 100 MG: at 12:09

## 2020-09-10 NOTE — TRANSFER OF CARE
Anesthesia Transfer of Care Note    Patient: Audrey Natarajan    Procedure(s) Performed: Procedure(s) (LRB):  CHOLECYSTECTOMY, LAPAROSCOPIC (N/A)    Patient location: PACU    Anesthesia Type: general    Transport from OR: Transported from OR on room air with adequate spontaneous ventilation    Post pain: adequate analgesia    Post assessment: no apparent anesthetic complications and tolerated procedure well    Post vital signs: stable    Level of consciousness: awake, alert and oriented    Nausea/Vomiting: no nausea/vomiting    Complications: none    Transfer of care protocol was followed      Last vitals:   Visit Vitals  BP (!) 152/66 (BP Location: Left arm, Patient Position: Lying)   Pulse 105   Temp 36.5 °C (97.7 °F) (Oral)   Resp 16   Wt 105.4 kg (232 lb 5.8 oz)   LMP 11/01/2011 (LMP Unknown)   SpO2 98%   Breastfeeding No   BMI 37.50 kg/m²

## 2020-09-10 NOTE — BRIEF OP NOTE
[facility]  Brief Operative Note    Surgery Date: 9/10/2020     Surgeon(s) and Role:     * Montrell Gutierrez MD - Primary     * Trish Garcia MD - Resident - Assisting        Pre-op Diagnosis:  Biliary calculus of other site without obstruction [K80.80]    Post-op Diagnosis:  Post-Op Diagnosis Codes:     * Biliary calculus of other site without obstruction [K80.80]    Procedure(s) (LRB):  CHOLECYSTECTOMY, LAPAROSCOPIC (N/A)    Anesthesia: General    Description of the findings of the procedure(s): gallstones    Estimated Blood Loss: minimal         Specimens:   Specimen (12h ago, onward)    None            Discharge Note    OUTCOME: Patient tolerated treatment/procedure well without complication and is now ready for discharge.    DISPOSITION: Home or Self Care    FINAL DIAGNOSIS:  Cholelithiasis    FOLLOWUP: In clinic    DISCHARGE INSTRUCTIONS:    Discharge Procedure Orders   Diet general     Call MD for:  temperature >100.4     Call MD for:  persistent nausea and vomiting     Call MD for:  severe uncontrolled pain     Call MD for:  difficulty breathing, headache or visual disturbances     Call MD for:  redness, tenderness, or signs of infection (pain, swelling, redness, odor or green/yellow discharge around incision site)     Call MD for:  hives     Remove dressing in 24 hours     Shower on day dressing removed (No bath)        Clinical Reference Documents Added to Patient Instructions       Document    CHOLECYSTECTOMY (ENGLISH)    CHOLECYSTECTOMY (LAPAROSCOPIC), DISCHARGE INSTRUCTIONS (ENGLISH)

## 2020-09-10 NOTE — ANESTHESIA PROCEDURE NOTES
Intubation  Performed by: Loraine Welch CRNA  Authorized by: Zion Jessica MD     Intubation:     Induction:  Intravenous    Intubated:  Postinduction    Mask Ventilation:  Easy mask    Attempts:  1    Attempted By:  Student (ADI Corcoran)    Blade:  April 3    Laryngeal View Grade: Grade I - full view of chords      Difficult Airway Encountered?: No      Airway Device:  Oral endotracheal tube    Airway Device Size:  7.0    Style/Cuff Inflation:  Cuffed    Inflation Amount (mL):  5    Tube secured:  21    Placement Verified By:  Capnometry    Complicating Factors:  Obesity and oropharyngeal edema or fat    Findings Post-Intubation:  BS equal bilateral

## 2020-09-10 NOTE — DISCHARGE INSTRUCTIONS
Tammy Morgan and Neisha  Office # [phone]     Discharge Instructions for Same Day Surgery     Call the office for and appointment if one has not already been made.     Diet: Drink plenty of fluids the first 48 hours and you may resume your   usual diet.     Activity: No heavy lifting (over 10 pounds), pushing or pulling until your   post op visit. Your doctor's office may have told you to limit your lifting to less weight, or even no weight.  Be sure to follow those instructions.    Note: You may ride in a car and you may drive when comfortable.     Do not drive, drink alcohol, or sign legal documents for 24 hours, or if taking narcotic pain medication.    Dressings: Remove the dressing 24 hours after surgery. You may shower  24 hours after surgery and you may wash your hair.     If you have steri strips (appears to be strips of white tape) on   your incision, leave them on. In 5-7 days they will begin to fall off.      Medical: Call the doctor for any of the following problems: fever above 101,   severe pain, bleeding, or abdominal distention (swelling).   If constipated you may take any stool softener you choose.     Occasionally small areas of skin numbness or an unpleasant skin sensation can result. Also, you may find that your incision is swollen and tender for a few days.  Some redness around sutures and staples is a normal reaction, but if the discomfort persists or worsens, call you doctor.   Fall Prevention  Millions of people fall every year and injure themselves. You may have had anesthesia or sedation which may increase your risk of falling. You may have health issues that put you at an increased risk of falling.     Here are ways to reduce your risk of falling.  ·   · Make your home safe by keeping walkways clear of objects you may trip over.  · Use non-slip pads under rugs. Do not use area rugs or small throw rugs.  · Use non-slip mats in bathtubs and showers.  · Install handrails and lights  on staircases.  · Do not walk in poorly lit areas.  · Do not stand on chairs or wobbly ladders.  · Use caution when reaching overhead or looking upward. This position can cause a loss of balance.  · Be sure your shoes fit properly, have non-slip bottoms and are in good condition.   · Wear shoes both inside and out. Avoid going barefoot or wearing slippers.  · Be cautious when going up and down stairs, curbs, and when walking on uneven sidewalks.  · If your balance is poor, consider using a cane or walker.  · If your fall was related to alcohol use, stop or limit alcohol intake.   · If your fall was related to use of sleeping medicines, talk to your doctor about this. You may need to reduce your dosage at bedtime if you awaken during the night to go to the bathroom.    · To reduce the need for nighttime bathroom trips:  ¨ Avoid drinking fluids for several hours before going to bed  ¨ Empty your bladder before going to bed  ¨ Men can keep a urinal at the bedside  · Stay as active as you can. Balance, flexibility, strength, and endurance all come from exercise. They all play a role in preventing falls. Ask your healthcare provider which types of activity are right for you.  · Get your vision checked on a regular basis.  · If you have pets, know where they are before you stand up or walk so you don't trip over them.  Use night lights.

## 2020-09-10 NOTE — PLAN OF CARE
Kristi 10/10. AAOX4. VSS per flow sheet. No n/v present at this time. Pain level is tolerable per patient.   Lap sites x 3 to abdomen covered with island dressings cdi.   See chart for full assessment.

## 2020-09-10 NOTE — INTERVAL H&P NOTE
The patient has been examined and the H&P has been reviewed:    I concur with the findings and no changes have occurred since H&P was written.    Surgery risks, benefits and alternative options discussed and understood by patient/family.          Active Hospital Problems    Diagnosis  POA    Cholelithiasis [K80.20]  Yes      Resolved Hospital Problems   No resolved problems to display.

## 2020-09-10 NOTE — PLAN OF CARE
Preop plan of care reviewed.  Questions encouraged and questions answered. Pt verbalized readiness to proceed.Very talkative and nervous.  Reports feeling better now.  Talked frequently about getting affairs in order preop in case of death.  Reassurance provided.

## 2020-09-10 NOTE — OP NOTE
Laparoscopic cholecystectomy      DATE OF PROCEDURE: 09/10/2020       PREOPERATIVE DIAGNOSIS: Symptomatic cholelithiasis.     POSTOPERATIVE DIAGNOSIS: same     PROCEDURE PERFORMED: Laparoscopic cholecystectomy.     SURGEON: Montrell Gutierrez M.D.    ASST: Trish Garcia MD     ANESTHESIA: General.     ESTIMATED BLOOD LOSS: minimal     DESCRIPTION OF OPERATION: The patient was brought to the Operating Room, placed  on operating room table in supine position. Under adequate anesthesia, prepped  and draped around her abdomen in the usual sterile fashion. Incision was made   in right mid abdomen through which a 5-mm port was inserted under direct vision. The   patient had her abdomen insufflated with 3.5 liters of CO2. Two other ports   were placed. An epigastric 11 mm and off right subcostal 5 mm port were placed.  The gallbladder was identified and retracted in cephalad fashion. Dissection   was done around the triangle of Calot. The cystic duct and cystic artery were   both identified and divided. The cystic duct was clipped. The gallbladder was   removed from the gallbladder fossa in antegrade fashion and brought out through   the epigastric port site. The abdomen was desufflated. The ports were removed   and port sites closed in layers with absorbable suture. Steri-Strips were   applied as well as bandage. The patient was awakened and transported to the   Recovery Room in satisfactory condition.

## 2020-09-11 NOTE — ANESTHESIA POSTPROCEDURE EVALUATION
Anesthesia Post Evaluation    Patient: Audrey Natarajan    Procedure(s) Performed: Procedure(s) (LRB):  CHOLECYSTECTOMY, LAPAROSCOPIC (N/A)    Final Anesthesia Type: general    Patient location during evaluation: PACU  Patient participation: Yes- Able to Participate  Level of consciousness: awake and alert  Post-procedure vital signs: reviewed and stable  Pain management: adequate  Airway patency: patent    PONV status at discharge: No PONV  Anesthetic complications: no      Cardiovascular status: blood pressure returned to baseline and hemodynamically stable  Respiratory status: unassisted and spontaneous ventilation  Hydration status: euvolemic  Follow-up not needed.          Vitals Value Taken Time   /66 09/10/20 1550   Temp 36.7 °C (98 °F) 09/10/20 1550   Pulse 86 09/10/20 1550   Resp 18 09/10/20 1550   SpO2 98 % 09/10/20 1550         Event Time   Out of Recovery 14:13:53         Pain/Kristi Score: Pain Rating Prior to Med Admin: 6 (9/10/2020  3:09 PM)  Pain Rating Post Med Admin: 3 (9/10/2020  3:50 PM)  Kristi Score: 10 (9/10/2020  3:20 PM)  Modified Kristi Score: 20 (9/10/2020  3:50 PM)

## 2020-09-14 LAB
FINAL PATHOLOGIC DIAGNOSIS: NORMAL
GROSS: NORMAL

## 2020-09-16 DIAGNOSIS — K21.9 GASTROESOPHAGEAL REFLUX DISEASE WITHOUT ESOPHAGITIS: Primary | ICD-10-CM

## 2020-09-16 RX ORDER — PANTOPRAZOLE SODIUM 40 MG/1
TABLET, DELAYED RELEASE ORAL
Qty: 30 TABLET | Refills: 5 | Status: ON HOLD | OUTPATIENT
Start: 2020-09-16 | End: 2021-02-20 | Stop reason: HOSPADM

## 2020-09-17 ENCOUNTER — OFFICE VISIT (OUTPATIENT)
Dept: SURGERY | Facility: CLINIC | Age: 50
End: 2020-09-17
Payer: MEDICAID

## 2020-09-17 VITALS
HEART RATE: 80 BPM | WEIGHT: 232 LBS | DIASTOLIC BLOOD PRESSURE: 82 MMHG | BODY MASS INDEX: 37.28 KG/M2 | SYSTOLIC BLOOD PRESSURE: 129 MMHG | HEIGHT: 66 IN

## 2020-09-17 DIAGNOSIS — K80.80 BILIARY CALCULUS OF OTHER SITE WITHOUT OBSTRUCTION: Primary | ICD-10-CM

## 2020-09-17 PROCEDURE — 99024 PR POST-OP FOLLOW-UP VISIT: ICD-10-PCS | Mod: S$GLB,,, | Performed by: SURGERY

## 2020-09-17 PROCEDURE — 99024 POSTOP FOLLOW-UP VISIT: CPT | Mod: S$GLB,,, | Performed by: SURGERY

## 2020-09-17 NOTE — PROGRESS NOTES
Subjective:       Patient ID: Audrey Nataarjan is a 50 y.o. female.    Chief Complaint: Post-op Evaluation    HPI 51 yo female s/p lap misael without complaints  Review of Systems   Constitutional: Negative.    HENT: Negative.    Eyes: Negative.    Respiratory: Negative.    Cardiovascular: Negative.    Gastrointestinal: Negative.    Endocrine: Negative.    Musculoskeletal: Negative.    Integumentary:  Negative.   Allergic/Immunologic: Negative.    Neurological: Negative.    Hematological: Negative.    Psychiatric/Behavioral: Negative.    All other systems reviewed and are negative.        Objective:      Physical Exam  Vitals signs reviewed.   Constitutional:       Appearance: She is well-developed.   HENT:      Head: Normocephalic and atraumatic.      Right Ear: External ear normal.      Left Ear: External ear normal.      Nose: Nose normal.   Eyes:      Conjunctiva/sclera: Conjunctivae normal.      Pupils: Pupils are equal, round, and reactive to light.   Neck:      Musculoskeletal: Normal range of motion and neck supple.   Cardiovascular:      Rate and Rhythm: Normal rate and regular rhythm.      Heart sounds: Normal heart sounds.   Pulmonary:      Effort: Pulmonary effort is normal.      Breath sounds: Normal breath sounds.   Abdominal:      General: Bowel sounds are normal.      Palpations: Abdomen is soft.   Musculoskeletal: Normal range of motion.   Skin:     General: Skin is warm and dry.   Neurological:      Mental Status: She is alert and oriented to person, place, and time.      Deep Tendon Reflexes: Reflexes are normal and symmetric.   Psychiatric:         Behavior: Behavior normal.         Thought Content: Thought content normal.         Assessment:       1. Biliary calculus of other site without obstruction      doing well post op  Plan:       I will see her back prn

## 2020-09-21 ENCOUNTER — OFFICE VISIT (OUTPATIENT)
Dept: PODIATRY | Facility: CLINIC | Age: 50
End: 2020-09-21
Payer: MEDICAID

## 2020-09-21 ENCOUNTER — PATIENT OUTREACH (OUTPATIENT)
Dept: ADMINISTRATIVE | Facility: OTHER | Age: 50
End: 2020-09-21

## 2020-09-21 VITALS
HEART RATE: 86 BPM | SYSTOLIC BLOOD PRESSURE: 136 MMHG | HEIGHT: 66 IN | WEIGHT: 231.94 LBS | BODY MASS INDEX: 37.28 KG/M2 | DIASTOLIC BLOOD PRESSURE: 73 MMHG

## 2020-09-21 DIAGNOSIS — M20.5X1 HALLUX LIMITUS, ACQUIRED, RIGHT: ICD-10-CM

## 2020-09-21 DIAGNOSIS — M20.12 HALLUX ABDUCTO VALGUS, LEFT: ICD-10-CM

## 2020-09-21 DIAGNOSIS — B35.1 ONYCHOMYCOSIS OF RIGHT GREAT TOE: ICD-10-CM

## 2020-09-21 DIAGNOSIS — M20.42 HAMMER TOES OF BOTH FEET: ICD-10-CM

## 2020-09-21 DIAGNOSIS — M20.5X2 HALLUX LIMITUS, ACQUIRED, LEFT: ICD-10-CM

## 2020-09-21 DIAGNOSIS — E11.49 TYPE II DIABETES MELLITUS WITH NEUROLOGICAL MANIFESTATIONS: Primary | ICD-10-CM

## 2020-09-21 DIAGNOSIS — M20.41 HAMMER TOES OF BOTH FEET: ICD-10-CM

## 2020-09-21 PROCEDURE — 99215 OFFICE O/P EST HI 40 MIN: CPT | Mod: PBBFAC,PO | Performed by: PODIATRIST

## 2020-09-21 PROCEDURE — 99213 PR OFFICE/OUTPT VISIT, EST, LEVL III, 20-29 MIN: ICD-10-PCS | Mod: S$PBB,,, | Performed by: PODIATRIST

## 2020-09-21 PROCEDURE — 99999 PR PBB SHADOW E&M-EST. PATIENT-LVL V: CPT | Mod: PBBFAC,,, | Performed by: PODIATRIST

## 2020-09-21 PROCEDURE — 99213 OFFICE O/P EST LOW 20 MIN: CPT | Mod: S$PBB,,, | Performed by: PODIATRIST

## 2020-09-21 PROCEDURE — 99999 PR PBB SHADOW E&M-EST. PATIENT-LVL V: ICD-10-PCS | Mod: PBBFAC,,, | Performed by: PODIATRIST

## 2020-09-21 NOTE — PROGRESS NOTES
rSubjective:      Patient ID: Audrey Natarajan is a 50 y.o. female.    Chief Complaint: Diabetes Mellitus (ov 7/17/20 Pena pcp), Foot Pain (left foot), and Nail Care      Audrey Natarajan is a 50 y.o. female who presents to the podiatry clinic  with complaint of left shin and central metatarsal pain.  Description: moderate Nature: aching, throbbing and bruising Onset of the symptoms was several months ago. Precipitating event: increased activity while on vacation.  History of injury: no Current symptoms include: worsening symptoms after a period of activity. Aggravating factors: standing and walking. Alleviating factors: rest. Symptoms have progressed to a point and plateaued. Patient has had prior foot problems. Evaluation to date: none. Treatment to date: none. Patients rates pain 6/10 on pain scale.      09/30/2019 patient presents to clinic for follow-up of persistent right foot and leg pain.  She relates that she discontinue use of Cam boot several days ago with instruction her primary care physician.  Patient states that she may have re-injured the foot secondary to a misstep a few days ago. She continues to have pain to the forefoot and lateral foot and ankle as well as the anterior leg.  Patient relates that she has done some icing.  She relates that she has not been taking any anti-inflammatories consistently.  Patient relates that she uses a lidocaine cream for pain which is somewhat useful.  Patient denies nausea, vomiting, fever, chills    12/05/2019 She has been wearing compression stockings with ankle brace and relates significant overall improvement.    02/06/2020  She has been wearing compression stockings with ankle brace and relates improvement but pain and swelling persists.    06/23/2020 The patient's chief complaint is elongated, thickened toenails aggravated by increased weight bearing, shoe gear, pressure. she is a diabetes and therefore has an increased risk of ulceration/amputation  requiring routine evaluation/management/optomization of feet.  Patient also complaining about multiple areas of pain.  She relates a fall approximately 1 week ago.  She states that she will be long to Urgent Care following our encounter.    09/22/2020 The patient's chief complaint is how to properly care for feet as a diabetic. She also complains of elongated, thickened toenails aggravated by increased weight bearing, shoe gear, pressure. she is a diabetic and therefore has an increased risk of ulceration/amputation requiring routine evaluation/management/optomization of feet.        PCP: Donaldo Pena MD    Date Last Seen by PCP:   Chief Complaint   Patient presents with    Diabetes Mellitus     ov 7/17/20 Becky pcp    Foot Pain     left foot    Nail Care       Hemoglobin A1C   Date Value Ref Range Status   06/16/2020 7.6 (H) 4.0 - 5.6 % Final     Comment:     ADA Screening Guidelines:  5.7-6.4%  Consistent with prediabetes  >or=6.5%  Consistent with diabetes  High levels of fetal hemoglobin interfere with the HbA1C  assay. Heterozygous hemoglobin variants (HbS, HgC, etc)do  not significantly interfere with this assay.   However, presence of multiple variants may affect accuracy.     02/28/2020 7.7 (H) 4.0 - 5.6 % Final     Comment:     ADA Screening Guidelines:  5.7-6.4%  Consistent with prediabetes  >or=6.5%  Consistent with diabetes  High levels of fetal hemoglobin interfere with the HbA1C  assay. Heterozygous hemoglobin variants (HbS, HgC, etc)do  not significantly interfere with this assay.   However, presence of multiple variants may affect accuracy.     10/21/2019 6.7 (H) 4.0 - 5.6 % Final     Comment:     ADA Screening Guidelines:  5.7-6.4%  Consistent with prediabetes  >or=6.5%  Consistent with diabetes  High levels of fetal hemoglobin interfere with the HbA1C  assay. Heterozygous hemoglobin variants (HbS, HgC, etc)do  not significantly interfere with this assay.   However, presence of multiple  variants may affect accuracy.             Patient Active Problem List   Diagnosis    Essential hypertension    COPD (chronic obstructive pulmonary disease)    Hypertriglyceridemia    Tobacco abuse    Mild protein malnutrition    Diabetic neuropathy    Leg swelling    Incisional hernia without mention of obstruction or gangrene    DM type 2 without retinopathy    History of DVT (deep vein thrombosis)    History of pulmonary embolus (PE)    Bipolar 1 disorder    Gastroparesis due to DM    Thrombocytosis    Leukocytosis    Morbid obesity    Type 2 diabetes mellitus    Acne    Other chronic pain    Vomiting and diarrhea    Nuclear sclerosis of both eyes    Bilateral ocular hypertension    Refractive error    Long term (current) use of anticoagulants    Hypercoagulable state    History of pulmonary embolism    DVT, recurrent, lower extremity, chronic, left    Decreased ROM of ankle    Decreased strength of lower extremity    Balance problem    Gait abnormality    Calculus of gallbladder without cholecystitis without obstruction    Cholelithiasis       Current Outpatient Medications on File Prior to Visit   Medication Sig Dispense Refill    acetaminophen (ARTHRITIS PAIN RELIEVER) 650 MG TbSR Take 650 mg by mouth. Pt states taking 2 -3 times a day      albuterol (ACCUNEB) 0.63 mg/3 mL Nebu Take 3 mLs (0.63 mg total) by nebulization every 8 (eight) hours as needed (wheezing). Rescue 1 Box 1    albuterol (PROAIR HFA) 90 mcg/actuation inhaler INHALE 2 PUFFS BY MOUTH INTO THE LUNGS EVERY 6 HOURS AS NEEDED FOR WHEEZING. RESCUE 8.5 g 1    aspirin (ECOTRIN) 81 MG EC tablet Take 81 mg by mouth once daily.       azelastine (ASTELIN) 137 mcg (0.1 %) nasal spray 1 spray (137 mcg total) by Nasal route 2 (two) times daily. 30 mL 0    b complex vitamins tablet Take 1 tablet by mouth once daily. 90 tablet 2    blood-glucose meter kit To check BG once daily, to use with insurance preferred meter 1  each 0    carbamazepine (TEGRETOL) 200 mg tablet TK 2 TS PO BID  1    ciclopirox (LOPROX) 0.77 % Susp Apply topically once daily. 30 mL 3    ciclopirox-nail lacquer removr 8 % Kit Apply 1 application topically once a week. 1 kit 4    clotrimazole (LOTRIMIN) 1 % cream Apply topically 2 (two) times daily. 28 g 1    cyanocobalamin (VITAMIN B-12) 100 MCG tablet Take 1 tablet (100 mcg total) by mouth once daily. 90 tablet 1    cyclobenzaprine (FLEXERIL) 5 MG tablet TAKE 1 TABLET(5 MG) BY MOUTH THREE TIMES DAILY AS NEEDED FOR MUSCLE SPASMS 60 tablet 0    diphenhydrAMINE (BENADRYL) 50 MG capsule Take 1 capsule (50 mg total) by mouth every 6 (six) hours as needed for Itching or Allergies (Swelling of the throat, rash and shortness of breath). 20 capsule 0    DULERA 100-5 mcg/actuation HFAA INHALE 2 PUFFS INTO THE LUNGS TWICE DAILY 13 g 2    DUPIXENT 300 mg/2 mL Syrg inject 300mg SUBCUTANEOUSLY every other week  6    fluticasone propionate (FLONASE) 50 mcg/actuation nasal spray SHAKE LIQUID AND USE 1 SPRAY(50 MCG) IN EACH NOSTRIL TWICE DAILY 16 g 3    furosemide (LASIX) 20 MG tablet TAKE 1 TABLET(20 MG) BY MOUTH EVERY DAY 90 tablet 2    gabapentin (NEURONTIN) 600 MG tablet TAKE 1 TABLET(600 MG) BY MOUTH TWICE DAILY 180 tablet 2    HYDROcodone-acetaminophen (NORCO) 5-325 mg per tablet Take 1 tablet by mouth every 8 (eight) hours as needed (Severe pain not controlled by other medications). 21 tablet 0    HYDROcodone-acetaminophen (NORCO) 5-325 mg per tablet Take 1 tablet by mouth every 6 (six) hours as needed for Pain. 30 tablet 0    hydrOXYzine pamoate (VISTARIL) 25 MG Cap       lancets Misc To check BG once daily, to use with insurance preferred meter 30 each 2    lidocaine 3 % Crea Rub on lower back for pain as needed no more than 3 times a day 1 Tube 0    lisinopriL (PRINIVIL,ZESTRIL) 5 MG tablet TAKE 1 TABLET(5 MG) BY MOUTH EVERY DAY 90 tablet 2    loratadine (CLARITIN) 10 mg tablet Take 1 tablet (10 mg  total) by mouth once daily. 30 tablet 5    meloxicam (MOBIC) 15 MG tablet TAKE 1 TABLET(15 MG) BY MOUTH EVERY DAY (Patient taking differently: daily as needed. ) 30 tablet 2    metFORMIN (GLUCOPHAGE) 1000 MG tablet TAKE 1 TABLET(1000 MG) BY MOUTH TWICE DAILY WITH MEALS 180 tablet 2    metoprolol tartrate (LOPRESSOR) 50 MG tablet Take 1 tablet (50 mg total) by mouth 2 (two) times daily. 60 tablet 2    mometasone-formoterol (DULERA) 200-5 mcg/actuation inhaler Inhale 1 puff into the lungs 2 (two) times daily. 8.8 g 1    multivitamin (THERAGRAN) per tablet Take 1 tablet by mouth every morning. 90 tablet 2    nicotine polacrilex 2 MG Lozg 1-2 per hour in place of cigarettes maximum of 20 per day. 168 lozenge 0    NYSTOP powder APPLY TO AFFECTED AREA TWICE DAILY 60 g 2    omega-3 fatty acids/fish oil (FISH OIL-OMEGA-3 FATTY ACIDS) 300-1,000 mg capsule Take 1 capsule by mouth once daily.      ondansetron (ZOFRAN) 4 MG tablet Take 1 tablet (4 mg total) by mouth every 8 (eight) hours as needed for Nausea. 30 tablet 0    ondansetron (ZOFRAN-ODT) 4 MG TbDL Take 1 tablet (4 mg total) by mouth every 6 (six) hours as needed (Nausea or vomiting). 20 tablet 0    pantoprazole (PROTONIX) 40 MG tablet TAKE 1 TABLET(40 MG) BY MOUTH EVERY DAY 30 tablet 5    pravastatin (PRAVACHOL) 40 MG tablet TAKE 1 TABLET(40 MG) BY MOUTH EVERY DAY 90 tablet 2    risperidone (RISPERDAL) 3 MG Tab take at bedtime  1    SITagliptin (JANUVIA) 100 MG Tab Take 1 tablet (100 mg total) by mouth once daily. 90 tablet 0    VYVANSE 50 mg capsule TK ONE C PO QAM  0     No current facility-administered medications on file prior to visit.        Review of patient's allergies indicates:   Allergen Reactions    Morphine Other (See Comments)     Patient had a psychotic episode after taking Morphine  Agitation, hallucinations    Penicillins Anaphylaxis     itching       Past Surgical History:   Procedure Laterality Date    ABDOMINAL SURGERY       "BILATERAL OOPHORECTOMY Bilateral 2015    gastric sleeve  2016    Green' s filter Right 2012    Right Neck & Tunneled Down.    HERNIA REPAIR      "[facility] around th [place].", pt. states    LAPAROSCOPIC CHOLECYSTECTOMY N/A 9/10/2020    Procedure: CHOLECYSTECTOMY, LAPAROSCOPIC;  Surgeon: Montrell uGtierrez MD;  Location[facility];  Service: General;  Laterality: N/A;  RN PREOP ----COVID Negative      OOPHORECTOMY      OVARIAN CYST REMOVAL  3/13/2014    IA REMOVAL OF OVARY/TUBE(S)      SPLENECTOMY, TOTAL  2003    TONSILLECTOMY      as a child    TYMPANOSTOMY TUBE PLACEMENT      VEIN SURGERY      Lt leg       Family History   Problem Relation Age of Onset    Hypertension Father     Diabetes Father     Heart disease Father     Cataracts Father     Diabetes Paternal Grandfather     Heart disease Paternal Grandfather     No Known Problems Mother     Ovarian cancer Maternal Grandmother          from this. ? age     No Known Problems Sister     No Known Problems Brother     No Known Problems Maternal Aunt     No Known Problems Maternal Uncle     No Known Problems Paternal Aunt     No Known Problems Paternal Uncle     No Known Problems Maternal Grandfather     Ovarian cancer Paternal Grandmother     Uterine cancer Neg Hx     Breast cancer Neg Hx     Colon cancer Neg Hx     Amblyopia Neg Hx     Blindness Neg Hx     Cancer Neg Hx     Glaucoma Neg Hx     Macular degeneration Neg Hx     Retinal detachment Neg Hx     Strabismus Neg Hx     Stroke Neg Hx     Thyroid disease Neg Hx        Social History     Socioeconomic History    Marital status: Significant Other     Spouse name: Not on file    Number of children: Not on file    Years of education: Not on file    Highest education level: Not on file   Occupational History    Not on file   Social Needs    Financial resource strain: Not on file    Food insecurity     Worry: Not on file " "    Inability: Not on file    Transportation needs     Medical: Not on file     Non-medical: Not on file   Tobacco Use    Smoking status: Current Every Day Smoker     Packs/day: 0.50     Years: 25.00     Pack years: 12.50     Types: Cigarettes    Smokeless tobacco: Never Used    Tobacco comment: 8/27/20 down to 2 cigarettes   Substance and Sexual Activity    Alcohol use: No     Alcohol/week: 0.0 standard drinks    Drug use: No    Sexual activity: Yes     Partners: Male   Lifestyle    Physical activity     Days per week: Not on file     Minutes per session: Not on file    Stress: Not on file   Relationships    Social connections     Talks on phone: Not on file     Gets together: Not on file     Attends [employer] service: Not on file     Active member of club or organization: Not on file     Attends meetings of clubs or organizations: Not on file     Relationship status: Not on file   Other Topics Concern    Not on file   Social History Narrative    Not on file       Review of Systems   Constitution: Negative for chills and fever.   Cardiovascular: Positive for leg swelling. Negative for claudication.   Respiratory: Negative for cough and shortness of breath.    Skin: Positive for dry skin and nail changes. Negative for itching and rash.   Musculoskeletal: Positive for arthritis, back pain, falls, joint pain and myalgias. Negative for joint swelling and muscle weakness.   Gastrointestinal: Negative for diarrhea, nausea and vomiting.   Neurological: Positive for numbness and paresthesias. Negative for tremors and weakness.   Psychiatric/Behavioral: Negative for altered mental status and hallucinations.           Objective:      Vitals:    09/21/20 1438   BP: 136/73   Pulse: 86   Weight: 105.2 kg (231 lb 14.8 oz)   Height: 5' 6" (1.676 m)   PainSc:   7       Physical Exam  Nursing note reviewed.   Constitutional:       General: She is not in acute distress.     Appearance: She is not toxic-appearing or " diaphoretic.   Cardiovascular:      Pulses:           Dorsalis pedis pulses are 2+ on the right side and 2+ on the left side.        Posterior tibial pulses are 2+ on the right side and 2+ on the left side.   Pulmonary:      Effort: No respiratory distress.   Musculoskeletal:      Right ankle: She exhibits decreased range of motion and swelling. No tenderness. No lateral malleolus, no medial malleolus, no AITFL, no CF ligament and no posterior TFL tenderness found. Achilles tendon exhibits no pain, no defect and normal Paniagua's test results.      Left ankle: She exhibits decreased range of motion and swelling. Tenderness (anterior shin pain). No lateral malleolus, no medial malleolus, no AITFL, no CF ligament, no posterior TFL and no proximal fibula tenderness found. Achilles tendon exhibits no pain, no defect and normal Paniagua's test results.      Right foot: No bony tenderness.      Left foot: Swelling present.      Comments: Biomechanical exam: There is equinus deformity bilateral with decreased dorsiflexion at the ankle joint bilateral. No tenderness with compression of heel. Negative tinels sign. Gait analysis reveals excessive pronation through midstance and propulsion with early heel off. Shoes reveals lateral heel counter wear bilateral     Decreased first MPJ range of motion both weightbearing and nonweightbearing, no crepitus observed the first MP joint, + dorsal flag sign. Mild  bunion deformity is observed .    Patient has hammertoes of digits 2-5 bilateral partially reducible without symptom today.     Skin:     General: Skin is warm and dry.      Coloration: Skin is not pale.      Findings: No bruising, burn, laceration, lesion or rash.      Nails: There is no clubbing.        Comments: Examination of the skin reveals no evidence of significant rashes, open lesions, suspicious appearing nevi or other concerning lesions.     Toenails 1-5 bilaterally are thickened by 2-3 mm, discolored/yellowed,  dystrophic, brittle with subungual debris. Tender to distal nail plate pressure, without periungual skin abnormality of each.       Neurological:      Sensory: No sensory deficit.      Motor: No tremor, atrophy or abnormal muscle tone.      Deep Tendon Reflexes: Reflexes are normal and symmetric.      Comments: Paresthesias, and hyperesthesia bilateral feet at toes with no clearly identified trigger or source.    To[place]-Gilberto 5.07 monofilament is intact bilateral feet. Sharp/dull sensation is also intact Bilateral feet.   Psychiatric:         Attention and Perception: She is attentive.         Mood and Affect: Mood is not anxious. Affect is not inappropriate.         Speech: She is communicative. Speech is not slurred.         Behavior: Behavior is not combative.               Assessment:       Encounter Diagnoses   Name Primary?    Type II diabetes mellitus with neurological manifestations Yes    Hallux limitus, acquired, right     Hallux limitus, acquired, left     Hammer toes of both feet     Hallux abducto valgus, left     Onychomycosis of right great toe          Plan:       Audrey was seen today for diabetes mellitus, foot pain and nail care.    Diagnoses and all orders for this visit:    Type II diabetes mellitus with neurological manifestations    Hallux limitus, acquired, right    Hallux limitus, acquired, left    Hammer toes of both feet    Hallux abducto valgus, left    Onychomycosis of right great toe      I counseled the patient on her conditions, their implications and medical management.       Greater than 50% of this visit spent on counseling and coordination of care.    Education about the diabetic foot, neuropathy, and prevention of limb loss.    Shoe inspection. Diabetic Foot Education. Patient reminded of the importance of good nutrition/healthy diet/weight management and blood sugar control to help prevent podiatric complications of diabetes. Patient instructed on proper foot hygeine.  Wear comfortable, proper fitting shoes. Wash feet daily. Dry well. After drying, apply moisturizer to feet (no lotion to webspaces). Inspect feet daily for skin breaks, blisters, swelling, or redness. Wear cotton socks (preferably white)  Change socks every day. Do NOT walk barefoot. Do NOT use heating pads or hot water soaks. We discussed wearing proper shoe gear, daily foot inspections, never walking without protective shoe gear.     Discussed wearing appropriate shoe gear and avoiding flats, slippers, sandals, and going barefoot. My recommendation for OTC supports is [employer].  Advised patient on wearing compression wraps, tights during activity. Advised patient to warm up before and after exercise or increased activity. Gradually return to exercise level; careful not to overtrain.    Discussed all treatment options such as topical, oral, laser treatments vs surgical removal of nail permanent vs non-permanent in detail pertaining to nail fungus. Instructed patient on the importance of keeping fungus off of skin while treating nails. Patient instructed to use absorbent cotton socks and change them if they become sweaty; or wear an open-toe shoe or sandal. Wash the feet at least once a day with soap and water. Patient wants to try topical. Rx penlac Instructed patient that it takes time for symptoms to completely dissipate. Patient instructed to use lysol or over-the-counter antifungal powders or sprays to shoes daily and allow them to air dry, switching shoes from every other day would be optimal. Patient is to avoid barefoot walking in  high-risk environments (public showers, gyms and locker rooms) may prevent future infections.     She will continue to monitor the areas daily, inspect his feet, wear protective shoe gear when ambulatory, moisturizer to maintain skin integrity and follow in this office in approximately 6 months, sooner p.r.n.

## 2020-09-28 ENCOUNTER — HOSPITAL ENCOUNTER (OUTPATIENT)
Facility: HOSPITAL | Age: 50
Discharge: HOME | End: 2020-09-29
Attending: EMERGENCY MEDICINE | Admitting: SURGERY
Payer: MEDICAID

## 2020-09-28 DIAGNOSIS — R10.11 RUQ PAIN: Primary | ICD-10-CM

## 2020-09-28 DIAGNOSIS — R10.11 RIGHT UPPER QUADRANT ABDOMINAL PAIN: ICD-10-CM

## 2020-09-28 DIAGNOSIS — R52 PAIN: ICD-10-CM

## 2020-09-28 LAB
ALBUMIN SERPL BCP-MCNC: 4 G/DL (ref 3.5–5.2)
ALP SERPL-CCNC: 101 U/L (ref 55–135)
ALT SERPL W/O P-5'-P-CCNC: 19 U/L (ref 10–44)
ANION GAP SERPL CALC-SCNC: 12 MMOL/L (ref 8–16)
ANISOCYTOSIS BLD QL SMEAR: SLIGHT
APTT BLDCRRT: 26.8 SEC (ref 21–32)
AST SERPL-CCNC: 15 U/L (ref 10–40)
BACTERIA #/AREA URNS HPF: ABNORMAL /HPF
BASOPHILS NFR BLD: 2 % (ref 0–1.9)
BILIRUB SERPL-MCNC: 0.1 MG/DL (ref 0.1–1)
BILIRUB UR QL STRIP: NEGATIVE
BUN SERPL-MCNC: 7 MG/DL (ref 6–20)
CALCIUM SERPL-MCNC: 9.1 MG/DL (ref 8.7–10.5)
CHLORIDE SERPL-SCNC: 98 MMOL/L (ref 95–110)
CLARITY UR: CLEAR
CO2 SERPL-SCNC: 26 MMOL/L (ref 23–29)
COLOR UR: YELLOW
CREAT SERPL-MCNC: 0.7 MG/DL (ref 0.5–1.4)
DIFFERENTIAL METHOD: ABNORMAL
EOSINOPHIL NFR BLD: 2 % (ref 0–8)
ERYTHROCYTE [DISTWIDTH] IN BLOOD BY AUTOMATED COUNT: 14.5 % (ref 11.5–14.5)
EST. GFR  (AFRICAN AMERICAN): >60 ML/MIN/1.73 M^2
EST. GFR  (NON AFRICAN AMERICAN): >60 ML/MIN/1.73 M^2
GLUCOSE SERPL-MCNC: 194 MG/DL (ref 70–110)
GLUCOSE UR QL STRIP: NEGATIVE
HCT VFR BLD AUTO: 38.3 % (ref 37–48.5)
HGB BLD-MCNC: 12.5 G/DL (ref 12–16)
HGB UR QL STRIP: NEGATIVE
IMM GRANULOCYTES # BLD AUTO: ABNORMAL K/UL (ref 0–0.04)
IMM GRANULOCYTES NFR BLD AUTO: ABNORMAL % (ref 0–0.5)
INR PPP: 0.9 (ref 0.8–1.2)
KETONES UR QL STRIP: NEGATIVE
LACTATE SERPL-SCNC: 2.4 MMOL/L (ref 0.5–2.2)
LEUKOCYTE ESTERASE UR QL STRIP: ABNORMAL
LIPASE SERPL-CCNC: 49 U/L (ref 4–60)
LYMPHOCYTES NFR BLD: 45 % (ref 18–48)
MCH RBC QN AUTO: 29.1 PG (ref 27–31)
MCHC RBC AUTO-ENTMCNC: 32.6 G/DL (ref 32–36)
MCV RBC AUTO: 89 FL (ref 82–98)
MICROSCOPIC COMMENT: ABNORMAL
MONOCYTES NFR BLD: 5 % (ref 4–15)
NEUTROPHILS NFR BLD: 46 % (ref 38–73)
NITRITE UR QL STRIP: NEGATIVE
NRBC BLD-RTO: 0 /100 WBC
PH UR STRIP: 6 [PH] (ref 5–8)
PLATELET # BLD AUTO: 540 K/UL (ref 150–350)
PLATELET BLD QL SMEAR: ABNORMAL
PMV BLD AUTO: 9.5 FL (ref 9.2–12.9)
POTASSIUM SERPL-SCNC: 3.8 MMOL/L (ref 3.5–5.1)
PROT SERPL-MCNC: 7 G/DL (ref 6–8.4)
PROT UR QL STRIP: NEGATIVE
PROTHROMBIN TIME: 10 SEC (ref 9–12.5)
RBC # BLD AUTO: 4.29 M/UL (ref 4–5.4)
SODIUM SERPL-SCNC: 136 MMOL/L (ref 136–145)
SP GR UR STRIP: 1.01 (ref 1–1.03)
SQUAMOUS #/AREA URNS HPF: 5 /HPF
URN SPEC COLLECT METH UR: ABNORMAL
UROBILINOGEN UR STRIP-ACNC: NEGATIVE EU/DL
WBC # BLD AUTO: 17.96 K/UL (ref 3.9–12.7)
WBC #/AREA URNS HPF: 8 /HPF (ref 0–5)

## 2020-09-28 PROCEDURE — 63600175 PHARM REV CODE 636 W HCPCS: Performed by: EMERGENCY MEDICINE

## 2020-09-28 PROCEDURE — S0030 INJECTION, METRONIDAZOLE: HCPCS | Performed by: EMERGENCY MEDICINE

## 2020-09-28 PROCEDURE — 96366 THER/PROPH/DIAG IV INF ADDON: CPT

## 2020-09-28 PROCEDURE — 93005 ELECTROCARDIOGRAM TRACING: CPT

## 2020-09-28 PROCEDURE — 96376 TX/PRO/DX INJ SAME DRUG ADON: CPT

## 2020-09-28 PROCEDURE — 96375 TX/PRO/DX INJ NEW DRUG ADDON: CPT

## 2020-09-28 PROCEDURE — G0378 HOSPITAL OBSERVATION PER HR: HCPCS

## 2020-09-28 PROCEDURE — 25000003 PHARM REV CODE 250: Performed by: EMERGENCY MEDICINE

## 2020-09-28 PROCEDURE — 85610 PROTHROMBIN TIME: CPT

## 2020-09-28 PROCEDURE — U0002 COVID-19 LAB TEST NON-CDC: HCPCS | Performed by: EMERGENCY MEDICINE

## 2020-09-28 PROCEDURE — 83690 ASSAY OF LIPASE: CPT

## 2020-09-28 PROCEDURE — 85730 THROMBOPLASTIN TIME PARTIAL: CPT

## 2020-09-28 PROCEDURE — 93010 ELECTROCARDIOGRAM REPORT: CPT | Mod: ,,, | Performed by: INTERNAL MEDICINE

## 2020-09-28 PROCEDURE — 81000 URINALYSIS NONAUTO W/SCOPE: CPT

## 2020-09-28 PROCEDURE — 87040 BLOOD CULTURE FOR BACTERIA: CPT

## 2020-09-28 PROCEDURE — 85027 COMPLETE CBC AUTOMATED: CPT

## 2020-09-28 PROCEDURE — 80053 COMPREHEN METABOLIC PANEL: CPT

## 2020-09-28 PROCEDURE — 25500020 PHARM REV CODE 255: Performed by: EMERGENCY MEDICINE

## 2020-09-28 PROCEDURE — 99285 EMERGENCY DEPT VISIT HI MDM: CPT | Mod: 25

## 2020-09-28 PROCEDURE — 85007 BL SMEAR W/DIFF WBC COUNT: CPT | Mod: NCS

## 2020-09-28 PROCEDURE — 93010 EKG 12-LEAD: ICD-10-PCS | Mod: ,,, | Performed by: INTERNAL MEDICINE

## 2020-09-28 PROCEDURE — 96365 THER/PROPH/DIAG IV INF INIT: CPT | Mod: 59

## 2020-09-28 PROCEDURE — 83605 ASSAY OF LACTIC ACID: CPT

## 2020-09-28 RX ORDER — INSULIN ASPART 100 [IU]/ML
1-10 INJECTION, SOLUTION INTRAVENOUS; SUBCUTANEOUS EVERY 6 HOURS PRN
Status: DISCONTINUED | OUTPATIENT
Start: 2020-09-29 | End: 2020-09-29 | Stop reason: HOSPADM

## 2020-09-28 RX ORDER — ONDANSETRON 8 MG/1
8 TABLET, ORALLY DISINTEGRATING ORAL EVERY 8 HOURS PRN
Status: DISCONTINUED | OUTPATIENT
Start: 2020-09-29 | End: 2020-09-29 | Stop reason: HOSPADM

## 2020-09-28 RX ORDER — HYDROMORPHONE HYDROCHLORIDE 2 MG/ML
1 INJECTION, SOLUTION INTRAMUSCULAR; INTRAVENOUS; SUBCUTANEOUS
Status: COMPLETED | OUTPATIENT
Start: 2020-09-28 | End: 2020-09-28

## 2020-09-28 RX ORDER — ACETAMINOPHEN 325 MG/1
650 TABLET ORAL EVERY 8 HOURS PRN
Status: DISCONTINUED | OUTPATIENT
Start: 2020-09-29 | End: 2020-09-29 | Stop reason: HOSPADM

## 2020-09-28 RX ORDER — OXYCODONE HYDROCHLORIDE 5 MG/1
5 TABLET ORAL EVERY 4 HOURS PRN
Status: DISCONTINUED | OUTPATIENT
Start: 2020-09-29 | End: 2020-09-29 | Stop reason: HOSPADM

## 2020-09-28 RX ORDER — LIDOCAINE HYDROCHLORIDE 10 MG/ML
1 INJECTION, SOLUTION EPIDURAL; INFILTRATION; INTRACAUDAL; PERINEURAL ONCE
Status: DISCONTINUED | OUTPATIENT
Start: 2020-09-29 | End: 2020-09-29 | Stop reason: HOSPADM

## 2020-09-28 RX ORDER — METRONIDAZOLE 500 MG/100ML
500 INJECTION, SOLUTION INTRAVENOUS
Status: COMPLETED | OUTPATIENT
Start: 2020-09-28 | End: 2020-09-29

## 2020-09-28 RX ORDER — ENOXAPARIN SODIUM 100 MG/ML
40 INJECTION SUBCUTANEOUS EVERY 24 HOURS
Status: DISCONTINUED | OUTPATIENT
Start: 2020-09-29 | End: 2020-09-29 | Stop reason: HOSPADM

## 2020-09-28 RX ORDER — GLUCAGON 1 MG
1 KIT INJECTION
Status: DISCONTINUED | OUTPATIENT
Start: 2020-09-29 | End: 2020-09-29 | Stop reason: HOSPADM

## 2020-09-28 RX ORDER — CIPROFLOXACIN 2 MG/ML
400 INJECTION, SOLUTION INTRAVENOUS
Status: COMPLETED | OUTPATIENT
Start: 2020-09-28 | End: 2020-09-29

## 2020-09-28 RX ORDER — DEXTROSE MONOHYDRATE, SODIUM CHLORIDE, AND POTASSIUM CHLORIDE 50; 1.49; 4.5 G/1000ML; G/1000ML; G/1000ML
INJECTION, SOLUTION INTRAVENOUS CONTINUOUS
Status: DISCONTINUED | OUTPATIENT
Start: 2020-09-29 | End: 2020-09-29 | Stop reason: HOSPADM

## 2020-09-28 RX ADMIN — HYDROMORPHONE HYDROCHLORIDE 1 MG: 2 INJECTION, SOLUTION INTRAMUSCULAR; INTRAVENOUS; SUBCUTANEOUS at 07:09

## 2020-09-28 RX ADMIN — METRONIDAZOLE 500 MG: 500 INJECTION, SOLUTION INTRAVENOUS at 09:09

## 2020-09-28 RX ADMIN — SODIUM CHLORIDE 1000 ML: 0.9 INJECTION, SOLUTION INTRAVENOUS at 09:09

## 2020-09-28 RX ADMIN — HYDROMORPHONE HYDROCHLORIDE 1 MG: 2 INJECTION, SOLUTION INTRAMUSCULAR; INTRAVENOUS; SUBCUTANEOUS at 09:09

## 2020-09-28 RX ADMIN — IOHEXOL 100 ML: 350 INJECTION, SOLUTION INTRAVENOUS at 08:09

## 2020-09-29 VITALS
HEIGHT: 66 IN | HEART RATE: 75 BPM | TEMPERATURE: 98 F | OXYGEN SATURATION: 96 % | SYSTOLIC BLOOD PRESSURE: 126 MMHG | BODY MASS INDEX: 37.32 KG/M2 | DIASTOLIC BLOOD PRESSURE: 58 MMHG | RESPIRATION RATE: 18 BRPM | WEIGHT: 232.19 LBS

## 2020-09-29 PROBLEM — D72.829 LEUCOCYTOSIS: Status: ACTIVE | Noted: 2020-09-29

## 2020-09-29 LAB
ALBUMIN SERPL BCP-MCNC: 3.5 G/DL (ref 3.5–5.2)
ALP SERPL-CCNC: 83 U/L (ref 55–135)
ALT SERPL W/O P-5'-P-CCNC: 12 U/L (ref 10–44)
ANION GAP SERPL CALC-SCNC: 10 MMOL/L (ref 8–16)
AST SERPL-CCNC: 13 U/L (ref 10–40)
BASOPHILS # BLD AUTO: 0.14 K/UL (ref 0–0.2)
BASOPHILS NFR BLD: 1 % (ref 0–1.9)
BILIRUB SERPL-MCNC: 0.3 MG/DL (ref 0.1–1)
BUN SERPL-MCNC: 6 MG/DL (ref 6–20)
CALCIUM SERPL-MCNC: 8.5 MG/DL (ref 8.7–10.5)
CHLORIDE SERPL-SCNC: 100 MMOL/L (ref 95–110)
CO2 SERPL-SCNC: 27 MMOL/L (ref 23–29)
CREAT SERPL-MCNC: 0.6 MG/DL (ref 0.5–1.4)
CTP QC/QA: YES
DIFFERENTIAL METHOD: ABNORMAL
EOSINOPHIL # BLD AUTO: 0.7 K/UL (ref 0–0.5)
EOSINOPHIL NFR BLD: 4.8 % (ref 0–8)
ERYTHROCYTE [DISTWIDTH] IN BLOOD BY AUTOMATED COUNT: 14.7 % (ref 11.5–14.5)
EST. GFR  (AFRICAN AMERICAN): >60 ML/MIN/1.73 M^2
EST. GFR  (NON AFRICAN AMERICAN): >60 ML/MIN/1.73 M^2
ESTIMATED AVG GLUCOSE: 163 MG/DL (ref 68–131)
ESTIMATED AVG GLUCOSE: 163 MG/DL (ref 68–131)
GLUCOSE SERPL-MCNC: 130 MG/DL (ref 70–110)
HBA1C MFR BLD HPLC: 7.3 % (ref 4–5.6)
HBA1C MFR BLD HPLC: 7.3 % (ref 4–5.6)
HCT VFR BLD AUTO: 37.9 % (ref 37–48.5)
HGB BLD-MCNC: 12 G/DL (ref 12–16)
IMM GRANULOCYTES # BLD AUTO: 0.07 K/UL (ref 0–0.04)
IMM GRANULOCYTES NFR BLD AUTO: 0.5 % (ref 0–0.5)
LYMPHOCYTES # BLD AUTO: 5.8 K/UL (ref 1–4.8)
LYMPHOCYTES NFR BLD: 41.8 % (ref 18–48)
MCH RBC QN AUTO: 28.9 PG (ref 27–31)
MCHC RBC AUTO-ENTMCNC: 31.7 G/DL (ref 32–36)
MCV RBC AUTO: 91 FL (ref 82–98)
MONOCYTES # BLD AUTO: 1.4 K/UL (ref 0.3–1)
MONOCYTES NFR BLD: 10 % (ref 4–15)
NEUTROPHILS # BLD AUTO: 5.8 K/UL (ref 1.8–7.7)
NEUTROPHILS NFR BLD: 41.9 % (ref 38–73)
NRBC BLD-RTO: 0 /100 WBC
PLATELET # BLD AUTO: 490 K/UL (ref 150–350)
PMV BLD AUTO: 9.4 FL (ref 9.2–12.9)
POCT GLUCOSE: 135 MG/DL (ref 70–110)
POCT GLUCOSE: 243 MG/DL (ref 70–110)
POTASSIUM SERPL-SCNC: 4.2 MMOL/L (ref 3.5–5.1)
PROT SERPL-MCNC: 6.2 G/DL (ref 6–8.4)
RBC # BLD AUTO: 4.15 M/UL (ref 4–5.4)
SARS-COV-2 RDRP RESP QL NAA+PROBE: NEGATIVE
SODIUM SERPL-SCNC: 137 MMOL/L (ref 136–145)
WBC # BLD AUTO: 13.85 K/UL (ref 3.9–12.7)

## 2020-09-29 PROCEDURE — S0030 INJECTION, METRONIDAZOLE: HCPCS | Performed by: STUDENT IN AN ORGANIZED HEALTH CARE EDUCATION/TRAINING PROGRAM

## 2020-09-29 PROCEDURE — 25000003 PHARM REV CODE 250: Performed by: STUDENT IN AN ORGANIZED HEALTH CARE EDUCATION/TRAINING PROGRAM

## 2020-09-29 PROCEDURE — 63600175 PHARM REV CODE 636 W HCPCS: Performed by: SURGERY

## 2020-09-29 PROCEDURE — 90686 IIV4 VACC NO PRSV 0.5 ML IM: CPT | Performed by: SURGERY

## 2020-09-29 PROCEDURE — 96372 THER/PROPH/DIAG INJ SC/IM: CPT

## 2020-09-29 PROCEDURE — 63600175 PHARM REV CODE 636 W HCPCS: Performed by: EMERGENCY MEDICINE

## 2020-09-29 PROCEDURE — 90471 IMMUNIZATION ADMIN: CPT | Performed by: SURGERY

## 2020-09-29 PROCEDURE — 85025 COMPLETE CBC W/AUTO DIFF WBC: CPT

## 2020-09-29 PROCEDURE — 63600175 PHARM REV CODE 636 W HCPCS: Performed by: STUDENT IN AN ORGANIZED HEALTH CARE EDUCATION/TRAINING PROGRAM

## 2020-09-29 PROCEDURE — 96375 TX/PRO/DX INJ NEW DRUG ADDON: CPT

## 2020-09-29 PROCEDURE — 80053 COMPREHEN METABOLIC PANEL: CPT

## 2020-09-29 PROCEDURE — 25000003 PHARM REV CODE 250: Performed by: HOSPITALIST

## 2020-09-29 PROCEDURE — 36415 COLL VENOUS BLD VENIPUNCTURE: CPT

## 2020-09-29 PROCEDURE — 94640 AIRWAY INHALATION TREATMENT: CPT

## 2020-09-29 PROCEDURE — 25000242 PHARM REV CODE 250 ALT 637 W/ HCPCS: Performed by: STUDENT IN AN ORGANIZED HEALTH CARE EDUCATION/TRAINING PROGRAM

## 2020-09-29 PROCEDURE — 83036 HEMOGLOBIN GLYCOSYLATED A1C: CPT

## 2020-09-29 PROCEDURE — 96376 TX/PRO/DX INJ SAME DRUG ADON: CPT

## 2020-09-29 PROCEDURE — G0378 HOSPITAL OBSERVATION PER HR: HCPCS

## 2020-09-29 RX ORDER — LISINOPRIL 5 MG/1
5 TABLET ORAL DAILY
Status: DISCONTINUED | OUTPATIENT
Start: 2020-09-29 | End: 2020-09-29 | Stop reason: HOSPADM

## 2020-09-29 RX ORDER — METRONIDAZOLE 500 MG/100ML
500 INJECTION, SOLUTION INTRAVENOUS
Status: DISCONTINUED | OUTPATIENT
Start: 2020-09-29 | End: 2020-09-29 | Stop reason: HOSPADM

## 2020-09-29 RX ORDER — GABAPENTIN 300 MG/1
600 CAPSULE ORAL 2 TIMES DAILY
Status: DISCONTINUED | OUTPATIENT
Start: 2020-09-29 | End: 2020-09-29 | Stop reason: HOSPADM

## 2020-09-29 RX ORDER — RISPERIDONE 1 MG/1
3 TABLET ORAL NIGHTLY
Status: DISCONTINUED | OUTPATIENT
Start: 2020-09-29 | End: 2020-09-29 | Stop reason: HOSPADM

## 2020-09-29 RX ORDER — FLUTICASONE FUROATE AND VILANTEROL 100; 25 UG/1; UG/1
1 POWDER RESPIRATORY (INHALATION) DAILY
Status: DISCONTINUED | OUTPATIENT
Start: 2020-09-29 | End: 2020-09-29 | Stop reason: HOSPADM

## 2020-09-29 RX ORDER — CIPROFLOXACIN 500 MG/1
500 TABLET ORAL EVERY 12 HOURS
Status: DISCONTINUED | OUTPATIENT
Start: 2020-09-29 | End: 2020-09-29 | Stop reason: HOSPADM

## 2020-09-29 RX ORDER — HYDROMORPHONE HYDROCHLORIDE 2 MG/ML
1 INJECTION, SOLUTION INTRAMUSCULAR; INTRAVENOUS; SUBCUTANEOUS
Status: COMPLETED | OUTPATIENT
Start: 2020-09-29 | End: 2020-09-29

## 2020-09-29 RX ORDER — PRAVASTATIN SODIUM 40 MG/1
40 TABLET ORAL DAILY
Status: DISCONTINUED | OUTPATIENT
Start: 2020-09-29 | End: 2020-09-29 | Stop reason: HOSPADM

## 2020-09-29 RX ORDER — TRAMADOL HYDROCHLORIDE 100 MG/1
100 TABLET, EXTENDED RELEASE ORAL DAILY PRN
Qty: 15 TABLET | Refills: 0 | Status: SHIPPED | OUTPATIENT
Start: 2020-09-29 | End: 2020-10-15

## 2020-09-29 RX ORDER — METOPROLOL TARTRATE 50 MG/1
50 TABLET ORAL 2 TIMES DAILY
Status: DISCONTINUED | OUTPATIENT
Start: 2020-09-29 | End: 2020-09-29 | Stop reason: HOSPADM

## 2020-09-29 RX ORDER — PANTOPRAZOLE SODIUM 40 MG/1
40 TABLET, DELAYED RELEASE ORAL DAILY
Status: DISCONTINUED | OUTPATIENT
Start: 2020-09-29 | End: 2020-09-29 | Stop reason: HOSPADM

## 2020-09-29 RX ORDER — CARBAMAZEPINE 200 MG/1
200 TABLET ORAL 2 TIMES DAILY
Status: DISCONTINUED | OUTPATIENT
Start: 2020-09-29 | End: 2020-09-29 | Stop reason: HOSPADM

## 2020-09-29 RX ORDER — ALBUTEROL SULFATE 90 UG/1
2 AEROSOL, METERED RESPIRATORY (INHALATION) EVERY 6 HOURS PRN
Status: DISCONTINUED | OUTPATIENT
Start: 2020-09-29 | End: 2020-09-29 | Stop reason: HOSPADM

## 2020-09-29 RX ORDER — FUROSEMIDE 20 MG/1
20 TABLET ORAL DAILY
Status: DISCONTINUED | OUTPATIENT
Start: 2020-09-29 | End: 2020-09-29 | Stop reason: HOSPADM

## 2020-09-29 RX ORDER — CIPROFLOXACIN 2 MG/ML
400 INJECTION, SOLUTION INTRAVENOUS
Status: DISCONTINUED | OUTPATIENT
Start: 2020-09-29 | End: 2020-09-29

## 2020-09-29 RX ADMIN — CIPROFLOXACIN 400 MG: 2 INJECTION, SOLUTION INTRAVENOUS at 12:09

## 2020-09-29 RX ADMIN — METOPROLOL TARTRATE 50 MG: 50 TABLET, FILM COATED ORAL at 08:09

## 2020-09-29 RX ADMIN — INSULIN ASPART 4 UNITS: 100 INJECTION, SOLUTION INTRAVENOUS; SUBCUTANEOUS at 11:09

## 2020-09-29 RX ADMIN — METRONIDAZOLE 500 MG: 500 INJECTION, SOLUTION INTRAVENOUS at 08:09

## 2020-09-29 RX ADMIN — INFLUENZA VIRUS VACCINE 0.5 ML: 15; 15; 15; 15 SUSPENSION INTRAMUSCULAR at 01:09

## 2020-09-29 RX ADMIN — DEXTROSE, SODIUM CHLORIDE, AND POTASSIUM CHLORIDE 1000 ML: 5; .45; .15 INJECTION INTRAVENOUS at 02:09

## 2020-09-29 RX ADMIN — GABAPENTIN 600 MG: 300 CAPSULE ORAL at 08:09

## 2020-09-29 RX ADMIN — RISPERIDONE 3 MG: 1 TABLET ORAL at 01:09

## 2020-09-29 RX ADMIN — LISINOPRIL 5 MG: 5 TABLET ORAL at 08:09

## 2020-09-29 RX ADMIN — OXYCODONE HYDROCHLORIDE 5 MG: 5 TABLET ORAL at 04:09

## 2020-09-29 RX ADMIN — PANTOPRAZOLE SODIUM 40 MG: 40 TABLET, DELAYED RELEASE ORAL at 08:09

## 2020-09-29 RX ADMIN — OXYCODONE HYDROCHLORIDE 5 MG: 5 TABLET ORAL at 08:09

## 2020-09-29 RX ADMIN — PRAVASTATIN SODIUM 40 MG: 40 TABLET ORAL at 08:09

## 2020-09-29 RX ADMIN — METOPROLOL TARTRATE 50 MG: 50 TABLET, FILM COATED ORAL at 01:09

## 2020-09-29 RX ADMIN — HYDROMORPHONE HYDROCHLORIDE 1 MG: 2 INJECTION, SOLUTION INTRAMUSCULAR; INTRAVENOUS; SUBCUTANEOUS at 01:09

## 2020-09-29 RX ADMIN — FUROSEMIDE 20 MG: 20 TABLET ORAL at 08:09

## 2020-09-29 RX ADMIN — CARBAMAZEPINE 200 MG: 200 TABLET ORAL at 08:09

## 2020-09-29 RX ADMIN — FLUTICASONE FUROATE AND VILANTEROL TRIFENATATE 1 PUFF: 100; 25 POWDER RESPIRATORY (INHALATION) at 07:09

## 2020-09-29 RX ADMIN — CARBAMAZEPINE 200 MG: 200 TABLET ORAL at 04:09

## 2020-09-29 RX ADMIN — GABAPENTIN 600 MG: 300 CAPSULE ORAL at 01:09

## 2020-09-29 RX ADMIN — CIPROFLOXACIN 500 MG: 500 TABLET, FILM COATED ORAL at 11:09

## 2020-09-29 NOTE — HOSPITAL COURSE
patient with history of recent Lap. Ashley. Is admitted to surgery service for right flank pain,CT and US show amanda acute process,and labs are OK,but she has leucocytosis,Hopsital medicine is consulted for leucocytosis,no sign of active infection seen,she say she take occasionally steroid for sinus,leucocuytosis is improving,no growth in blood culture\ until now,her pain is improved at this time.

## 2020-09-29 NOTE — CONSULTS
"[facility] Hospital Medicine  Consult Note    Patient Name: Audrey Natarajan  MRN: [id number]  Admission Date: 9/28/2020  Hospital Length of Stay: 0 days  Attending Physician: Montrell Gutierrez MD   Primary Care Provider: Donaldo Pena MD           Patient information was obtained from patient and ER records.     Consults  Subjective:     Principal Problem: RUQ pain    Chief Complaint:   Chief Complaint   Patient presents with    Back Pain     Patient c/o right lower back pain that radiates up her back and "through me in my right shoulder blade" x 1 day with n/v and diarrhea.  Reports S/P cholecystectomy on 09/10/2020.  Reports that pain is coming in waves and feels the same as the gallstone pain she had prior to surgery.  Denies injury or fever.        HPI: This is a 51 y/o female with a PMHx of Diabetes mellitus, Hypertension, Hypercholesteremia, COPD, DVT, PE, Blood clots, Irregular heartbeat, CAD, and Neuromuscular disorder. She presents to the ED with a CC of back pain that has been ongoing for 5 days. Back pain starts in her right lower side, goes up to her shoulder blade, and "goes all the way through me." Pain has gotten worse over the past 2 days, and sitting aggravates pain. Associated symptoms are nausea and vomiting anytime she eats for 4 days and diarrhea. Patient had Laparoscopic cholecystectomy on 09/20/2020. She has been resting since. Had check up with Dr. Suri Rivers MD. She mentioned her symptoms, and they recommended Extra strength Tylenol without relief. Patient took nausea medication with relief. She has been drinking plenty of fluids, but can not keep food down. Patient is not on blood thinners. Has had Green's filter in since 07/04/2012. She has cough from normal allergies, no complaint. Allergies to Morphine, Penicillins, and Januvia.     Past Medical History:   Diagnosis Date    ADHD (attention deficit hyperactivity disorder)     Arthritis     Asthma  " "   Bipolar 1 disorder     Cataract     COPD (chronic obstructive pulmonary disease)     Coronary artery disease     A fib    Depression     bipolar manic depresson    Diabetes mellitus     DVT of lower extremity, bilateral July 2013    bilateral LE DVT. [place]el[place] filter placed.     Encounter for blood transfusion     History of blood clots 1. Left Leg=2003; 2.Bilateral Groin=Blood Clots= 5 or 6/ 2013 & 7/2013; 3. LLL of Lung=7/2013;  4. Lt. Lower Leg=7/2013.     Pt. had 1st Blood Clot after [id number], & Last=2013. [place] Filter= Rt.Lateral Neck.    HTN (hypertension) 6/6/2013    Pt states that she does not have hypertension    Hypercholesteremia     Irregular heartbeat     Neuromuscular disorder     neuropathy feet    PE (pulmonary embolism) July 2013     bilat LE DVT.     Restless leg syndrome        Past Surgical History:   Procedure Laterality Date    ABDOMINAL SURGERY      BILATERAL OOPHORECTOMY Bilateral 1/12/2015    gastric sleeve  2016    Green' s filter Right 7/4/2012    Right Neck & Tunneled Down.    HERNIA REPAIR      "[place] [facility] Hern[place] Repaires around th [place].", pt. states    LAPAROSCOPIC CHOLECYSTECTOMY N/A 9/10/2020    Procedure: CHOLECYSTECTOMY, LAPAROSCOPIC;  Surgeon: Montrell Gutierrez MD;  Location: [facility];  Service: General;  Laterality: N/A;  RN PREOP 9/9----COVID Negative  9/9    OOPHORECTOMY      OVARIAN CYST REMOVAL  3/13/2014    TX REMOVAL OF OVARY/TUBE(S)      SPLENECTOMY, TOTAL  July 2003    TONSILLECTOMY      as a child    TYMPANOSTOMY TUBE PLACEMENT  1976    VEIN SURGERY  2003    Lt leg       Review of patient's allergies indicates:   Allergen Reactions    Morphine Other (See Comments)     Patient had a psychotic episode after taking Morphine  Agitation, hallucinations    Penicillins Anaphylaxis     itching    Januvia [sitagliptin] Hives       No current facility-administered medications on file prior to encounter.      Current Outpatient " Medications on File Prior to Encounter   Medication Sig    acetaminophen (ARTHRITIS PAIN RELIEVER) 650 MG TbSR Take 650 mg by mouth. Pt states taking 2 -3 times a day    albuterol (ACCUNEB) 0.63 mg/3 mL Nebu Take 3 mLs (0.63 mg total) by nebulization every 8 (eight) hours as needed (wheezing). Rescue    albuterol (PROAIR HFA) 90 mcg/actuation inhaler INHALE 2 PUFFS BY MOUTH INTO THE LUNGS EVERY 6 HOURS AS NEEDED FOR WHEEZING. RESCUE    aspirin (ECOTRIN) 81 MG EC tablet Take 81 mg by mouth once daily.     azelastine (ASTELIN) 137 mcg (0.1 %) nasal spray 1 spray (137 mcg total) by Nasal route 2 (two) times daily.    b complex vitamins tablet Take 1 tablet by mouth once daily.    blood-glucose meter kit To check BG once daily, to use with insurance preferred meter    carbamazepine (TEGRETOL) 200 mg tablet TK 2 TS PO BID    ciclopirox (LOPROX) 0.77 % Susp Apply topically once daily.    ciclopirox-nail lacquer removr 8 % Kit Apply 1 application topically once a week.    clotrimazole (LOTRIMIN) 1 % cream Apply topically 2 (two) times daily.    cyanocobalamin (VITAMIN B-12) 100 MCG tablet Take 1 tablet (100 mcg total) by mouth once daily.    cyclobenzaprine (FLEXERIL) 5 MG tablet TAKE 1 TABLET(5 MG) BY MOUTH THREE TIMES DAILY AS NEEDED FOR MUSCLE SPASMS    diphenhydrAMINE (BENADRYL) 50 MG capsule Take 1 capsule (50 mg total) by mouth every 6 (six) hours as needed for Itching or Allergies (Swelling of the throat, rash and shortness of breath).    DULERA 100-5 mcg/actuation HFAA INHALE 2 PUFFS INTO THE LUNGS TWICE DAILY    DUPIXENT 300 mg/2 mL Syrg inject 300mg SUBCUTANEOUSLY every other week    fluticasone propionate (FLONASE) 50 mcg/actuation nasal spray SHAKE LIQUID AND USE 1 SPRAY(50 MCG) IN EACH NOSTRIL TWICE DAILY    furosemide (LASIX) 20 MG tablet TAKE 1 TABLET(20 MG) BY MOUTH EVERY DAY    gabapentin (NEURONTIN) 600 MG tablet TAKE 1 TABLET(600 MG) BY MOUTH TWICE DAILY    HYDROcodone-acetaminophen  (NORCO) 5-325 mg per tablet Take 1 tablet by mouth every 8 (eight) hours as needed (Severe pain not controlled by other medications).    HYDROcodone-acetaminophen (NORCO) 5-325 mg per tablet Take 1 tablet by mouth every 6 (six) hours as needed for Pain.    hydrOXYzine pamoate (VISTARIL) 25 MG Cap     lancets Misc To check BG once daily, to use with insurance preferred meter    lidocaine 3 % Crea Rub on lower back for pain as needed no more than 3 times a day    lisinopriL (PRINIVIL,ZESTRIL) 5 MG tablet TAKE 1 TABLET(5 MG) BY MOUTH EVERY DAY    loratadine (CLARITIN) 10 mg tablet Take 1 tablet (10 mg total) by mouth once daily.    meloxicam (MOBIC) 15 MG tablet TAKE 1 TABLET(15 MG) BY MOUTH EVERY DAY (Patient taking differently: daily as needed. )    metFORMIN (GLUCOPHAGE) 1000 MG tablet TAKE 1 TABLET(1000 MG) BY MOUTH TWICE DAILY WITH MEALS    metoprolol tartrate (LOPRESSOR) 50 MG tablet Take 1 tablet (50 mg total) by mouth 2 (two) times daily.    mometasone-formoterol (DULERA) 200-5 mcg/actuation inhaler Inhale 1 puff into the lungs 2 (two) times daily.    multivitamin (THERAGRAN) per tablet Take 1 tablet by mouth every morning.    nicotine polacrilex 2 MG Lozg 1-2 per hour in place of cigarettes maximum of 20 per day.    NYSTOP powder APPLY TO AFFECTED AREA TWICE DAILY    omega-3 fatty acids/fish oil (FISH OIL-OMEGA-3 FATTY ACIDS) 300-1,000 mg capsule Take 1 capsule by mouth once daily.    ondansetron (ZOFRAN) 4 MG tablet Take 1 tablet (4 mg total) by mouth every 8 (eight) hours as needed for Nausea.    ondansetron (ZOFRAN-ODT) 4 MG TbDL Take 1 tablet (4 mg total) by mouth every 6 (six) hours as needed (Nausea or vomiting).    pantoprazole (PROTONIX) 40 MG tablet TAKE 1 TABLET(40 MG) BY MOUTH EVERY DAY    pravastatin (PRAVACHOL) 40 MG tablet TAKE 1 TABLET(40 MG) BY MOUTH EVERY DAY    risperidone (RISPERDAL) 3 MG Tab take at bedtime    SITagliptin (JANUVIA) 100 MG Tab Take 1 tablet (100 mg total)  by mouth once daily.    VYVANSE 50 mg capsule TK ONE C PO QAM     Family History     Problem Relation (Age of Onset)    Cataracts Father    Diabetes Father, Paternal Grandfather    Heart disease Father, Paternal Grandfather    Hypertension Father    No Known Problems Mother, Sister, Brother, Maternal Aunt, Maternal Uncle, Paternal Aunt, Paternal Uncle, Maternal Grandfather    Ovarian cancer Maternal Grandmother, Paternal Grandmother        Tobacco Use    Smoking status: Current Every Day Smoker     Packs/day: 0.50     Years: 25.00     Pack years: 12.50     Types: Cigarettes    Smokeless tobacco: Never Used    Tobacco comment: 8/27/20 down to 2 cigarettes   Substance and Sexual Activity    Alcohol use: No     Alcohol/week: 0.0 standard drinks    Drug use: No    Sexual activity: Yes     Partners: Male     Review of Systems   Constitutional: Positive for activity change and appetite change.   HENT: Negative for congestion and dental problem.    Eyes: Positive for discharge. Negative for pain.   Respiratory: Negative for apnea and choking.    Cardiovascular: Negative for chest pain and leg swelling.   Gastrointestinal: Negative for anal bleeding.   Endocrine: Negative for cold intolerance and polydipsia.   Genitourinary: Negative for difficulty urinating.   Musculoskeletal: Positive for arthralgias and myalgias. Negative for back pain.   Skin: Negative for color change and pallor.   Allergic/Immunologic: Negative for environmental allergies and food allergies.   Neurological: Negative for dizziness and headaches.   Hematological: Negative for adenopathy. Does not bruise/bleed easily.     Objective:     Vital Signs (Most Recent):  Temp: 98 °F (36.7 °C) (09/29/20 0732)  Pulse: 75 (09/29/20 0752)  Resp: 18 (09/29/20 0844)  BP: (!) 153/66 (09/29/20 0732)  SpO2: (!) 94 % (09/29/20 0752) Vital Signs (24h Range):  Temp:  [97.5 °F (36.4 °C)-98.2 °F (36.8 °C)] 98 °F (36.7 °C)  Pulse:  [74-97] 75  Resp:  [16-22] 18  SpO2:   [94 %-97 %] 94 %  BP: (123-175)/(54-79) 153/66     Weight: 105.3 kg (232 lb 3.2 oz)  Body mass index is 37.48 kg/m².    Physical Exam  Constitutional:       Appearance: Normal appearance.   HENT:      Right Ear: Tympanic membrane normal.      Left Ear: Tympanic membrane normal.      Mouth/Throat:      Mouth: Mucous membranes are dry.   Eyes:      Extraocular Movements: Extraocular movements intact.   Neck:      Musculoskeletal: Normal range of motion and neck supple.   Cardiovascular:      Rate and Rhythm: Normal rate and regular rhythm.   Pulmonary:      Effort: Pulmonary effort is normal.      Breath sounds: Normal breath sounds.   Abdominal:      General: Abdomen is flat.      Palpations: Abdomen is soft.   Musculoskeletal: Normal range of motion.         General: No swelling or tenderness.   Skin:     General: Skin is warm and dry.   Neurological:      General: No focal deficit present.      Mental Status: She is alert and oriented to person, place, and time.   Psychiatric:         Mood and Affect: Mood normal.         Behavior: Behavior normal.         Significant Labs:   BMP:   Recent Labs   Lab 09/29/20  0416   *      K 4.2      CO2 27   BUN 6   CREATININE 0.6   CALCIUM 8.5*     CBC:   Recent Labs   Lab 09/28/20 1924 09/29/20  0416   WBC 17.96* 13.85*   HGB 12.5 12.0   HCT 38.3 37.9   * 490*     CMP:   Recent Labs   Lab 09/28/20 1924 09/29/20  0416    137   K 3.8 4.2   CL 98 100   CO2 26 27   * 130*   BUN 7 6   CREATININE 0.7 0.6   CALCIUM 9.1 8.5*   PROT 7.0 6.2   ALBUMIN 4.0 3.5   BILITOT 0.1 0.3   ALKPHOS 101 83   AST 15 13   ALT 19 12   ANIONGAP 12 10   EGFRNONAA >60 >60       Significant Imaging: reviewed.    Assessment/Plan:     * RUQ pain    With recent lap. Ashley.No acute finding on CT and US,suregty is prtimary    Essential hypertension  Stable,will monitor.      Leucocytosis    Hopsital medicine is consulted for leucocytosis,no sign of active infection  seen,she say she take occasionally steroid for sinus,leucocuytosis is improving,no growth in blood culture\ until now,her pain is improved at this time.    Right upper quadrant abdominal pain  As above.      DM type 2 without retinopathy  On SSI.      COPD (chronic obstructive pulmonary disease)  Stable on RA with no wheezing.        VTE Risk Mitigation (From admission, onward)         Ordered     enoxaparin injection 40 mg  Every 24 hours      09/28/20 2356     IP VTE HIGH RISK PATIENT  Once      09/28/20 2356     Place sequential compression device  Until discontinued      09/28/20 2356                    Thank you for your consult. I will follow-up with patient. Please contact us if you have any additional questions.    Abbey Germain MD  Department of Hospital Medicine   [facility]

## 2020-09-29 NOTE — PROGRESS NOTES
[facility] CASE MANAGEMENT                  WRITTEN DISCHARGE INFORMATION      APPOINTMENTS AND RESOURCES TO HELP YOU MANAGE YOUR CARE AT HOME BASED ON YOUR PREFERENCES:  Follow-up Information     Donaldo Pena MD. Go on 10/26/2020.    Specialties: Internal Medicine, Wound Care  Why: Outpatient Services: [facility] with PCP at 10:00 am.   Contact information:  [address] [facility] [address]  [phone]             Montrell Gutierrez MD. Go on 10/15/2020.    Specialties: General Surgery, Oncology  Why: Outpatient Services: [facility] with Surgeon at 10:00 am.   Contact information:  [address]  [address]  [facility] [address]  [phone]               Healthy Living Instructions to HELP MANAGE YOUR CARE AT HOME:  Things You are responsible for:  1.    Getting your prescriptions filled   2.    Taking your medications as directed, DO NOT MISS ANY DOSES!  3.    Following the diet and exercise recommended by your doctor  4.    Going to your follow-up doctor appointment. This is important because it allows the doctor to monitor your progress and determine if any changes need to made to your treatment plan.  5. If you have any questions about MANAGING YOUR CARE AT HOME Call the Nurse Care Line for 24/7 Assistance [phone]       Please answer any calls you may receive from [facility]. We want to continue to support you as you manage your healthcare needs. [facility] is happy to have the opportunity to serve you.      Thank you for choosing [facility] for your healthcare needs!  Your [facility] Case Management Team,    Kayla Daley   II  Care Management  [phone]

## 2020-09-29 NOTE — DISCHARGE SUMMARY
[facility]  General Surgery  Discharge Summary      Patient Name: Audrey Natarajan  MRN: [id number]  Admission Date: 9/28/2020  Hospital Length of Stay: 0 days  Discharge Date and Time:  09/29/2020 12:28 PM  Attending Physician: Montrell Gutierrez MD   Discharging Provider: Margarita Gaona MD  Primary Care Provider: Donaldo Pena MD     HPI: Audrey Natarajan is a 51 y/o F with PMH of HTN, DMII, COPD, recurrent DVT/PE s/p IVC filter 2012 and now s/p laparoscopic cholecystectomy on 9/10/20 for symptomatic cholelithiasis who presents to the ED with RUQ pain, for which general surgery was consulted.     * No surgery found *     Hospital Course: Patient was admitted for workup of her pain and leukocytosis. On further review, patient was found to have a persistent leukocytosis on previously labs. A workup including a CT abd/pelvis and a RUQ US did not reveal any abnormalities or complications from her surgery. Her pain improved and she was discharged home.      Consults:   Consults (From admission, onward)        Status Ordering Provider     Inpatient consult to General Surgery  Once     Provider:  MD Warren Singer CHRISTOFER BRIAN          Significant Diagnostic Studies: Labs:   BMP:   Recent Labs   Lab 09/28/20 1924 09/29/20  0416   * 130*    137   K 3.8 4.2   CL 98 100   CO2 26 27   BUN 7 6   CREATININE 0.7 0.6   CALCIUM 9.1 8.5*   , CMP   Recent Labs   Lab 09/28/20 1924 09/29/20  0416    137   K 3.8 4.2   CL 98 100   CO2 26 27   * 130*   BUN 7 6   CREATININE 0.7 0.6   CALCIUM 9.1 8.5*   PROT 7.0 6.2   ALBUMIN 4.0 3.5   BILITOT 0.1 0.3   ALKPHOS 101 83   AST 15 13   ALT 19 12   ANIONGAP 12 10   ESTGFRAFRICA >60 >60   EGFRNONAA >60 >60    and CBC   Recent Labs   Lab 09/28/20 1924 09/29/20  0416   WBC 17.96* 13.85*   HGB 12.5 12.0   HCT 38.3 37.9   * 490*       Pending Diagnostic Studies:     None        Final Active Diagnoses:     Diagnosis Date Noted POA    PRINCIPAL PROBLEM:  RUQ pain [R10.11] 09/28/2020 Yes    Leucocytosis [D72.829] 09/29/2020 Yes    Right upper quadrant abdominal pain [R10.11] 09/28/2020 Yes    DM type 2 without retinopathy [E11.9] 04/13/2015 Yes    COPD (chronic obstructive pulmonary disease) [J44.9] 10/11/2013 Yes     Chronic    Essential hypertension [I10] 06/06/2013 Yes     Chronic      Problems Resolved During this Admission:      Discharged Condition: good    Disposition: Home or Self Care    Follow Up:  Follow-up Information     Donaldo Pena MD. Go on 10/26/2020.    Specialties: Internal Medicine, Wound Care  Why: Outpatient Services: [facility] with PCP at 10:00 am.   Contact information:  [address] [id number]  [phone]             Montrell Gutierrez MD. Go on 10/15/2020.    Specialties: General Surgery, Oncology  Why: Outpatient Services: [facility] with Surgeon at 10:00 am.   Contact information:  [address]  [address]  [facility] [address]  [phone]                 Patient Instructions:      Diet Adult Regular     Activity as tolerated     Medications:  Reconciled Home Medications:      Medication List      START taking these medications    traMADoL 100 MG Tb24  Commonly known as: ULTRAM-ER  Take 1 tablet (100 mg total) by mouth daily as needed (pain).        CHANGE how you take these medications    meloxicam 15 MG tablet  Commonly known as: MOBIC  TAKE 1 TABLET(15 MG) BY MOUTH EVERY DAY  What changed:   · how much to take  · how to take this  · when to take this  · reasons to take this        CONTINUE taking these medications    * albuterol 0.63 mg/3 mL Nebu  Commonly known as: ACCUNEB  Take 3 mLs (0.63 mg total) by nebulization every 8 (eight) hours as needed (wheezing). Rescue     * albuterol 90 mcg/actuation inhaler  Commonly known as: PROAIR HFA  INHALE 2 PUFFS BY MOUTH INTO THE LUNGS EVERY 6 HOURS AS NEEDED FOR WHEEZING. RESCUE     ARTHRITIS PAIN RELIEVER 650 MG  Tbsr  Generic drug: acetaminophen  Take 650 mg by mouth. Pt states taking 2 -3 times a day     aspirin 81 MG EC tablet  Commonly known as: ECOTRIN  Take 81 mg by mouth once daily.     azelastine 137 mcg (0.1 %) nasal spray  Commonly known as: ASTELIN  1 spray (137 mcg total) by Nasal route 2 (two) times daily.     b complex vitamins tablet  Take 1 tablet by mouth once daily.     blood-glucose meter kit  To check BG once daily, to use with insurance preferred meter     carBAMazepine 200 mg tablet  Commonly known as: TEGRETOL  TK 2 TS PO BID     ciclopirox 0.77 % Susp  Commonly known as: LOPROX  Apply topically once daily.     ciclopirox-nail lacquer removr 8 % Kit  Apply 1 application topically once a week.     clotrimazole 1 % cream  Commonly known as: LOTRIMIN  Apply topically 2 (two) times daily.     cyanocobalamin 100 MCG tablet  Commonly known as: VITAMIN B-12  Take 1 tablet (100 mcg total) by mouth once daily.     cyclobenzaprine 5 MG tablet  Commonly known as: FLEXERIL  TAKE 1 TABLET(5 MG) BY MOUTH THREE TIMES DAILY AS NEEDED FOR MUSCLE SPASMS     diphenhydrAMINE 50 MG capsule  Commonly known as: BENADRYL  Take 1 capsule (50 mg total) by mouth every 6 (six) hours as needed for Itching or Allergies (Swelling of the throat, rash and shortness of breath).     DUPIXENT SYRINGE 300 mg/2 mL Syrg  Generic drug: dupilumab  inject 300mg SUBCUTANEOUSLY every other week     fish oil-omega-3 fatty acids 300-1,000 mg capsule  Take 1 capsule by mouth once daily.     fluticasone propionate 50 mcg/actuation nasal spray  Commonly known as: FLONASE  SHAKE LIQUID AND USE 1 SPRAY(50 MCG) IN EACH NOSTRIL TWICE DAILY     furosemide 20 MG tablet  Commonly known as: LASIX  TAKE 1 TABLET(20 MG) BY MOUTH EVERY DAY     gabapentin 600 MG tablet  Commonly known as: NEURONTIN  TAKE 1 TABLET(600 MG) BY MOUTH TWICE DAILY     * HYDROcodone-acetaminophen 5-325 mg per tablet  Commonly known as: NORCO  Take 1 tablet by mouth every 8 (eight)  hours as needed (Severe pain not controlled by other medications).     * HYDROcodone-acetaminophen 5-325 mg per tablet  Commonly known as: NORCO  Take 1 tablet by mouth every 6 (six) hours as needed for Pain.     hydrOXYzine pamoate 25 MG Cap  Commonly known as: VISTARIL     lancets Misc  To check BG once daily, to use with insurance preferred meter     lidocaine 3 % Crea  Rub on lower back for pain as needed no more than 3 times a day     lisinopriL 5 MG tablet  Commonly known as: PRINIVIL,ZESTRIL  TAKE 1 TABLET(5 MG) BY MOUTH EVERY DAY     loratadine 10 mg tablet  Commonly known as: CLARITIN  Take 1 tablet (10 mg total) by mouth once daily.     metFORMIN 1000 MG tablet  Commonly known as: GLUCOPHAGE  TAKE 1 TABLET(1000 MG) BY MOUTH TWICE DAILY WITH MEALS     metoprolol tartrate 50 MG tablet  Commonly known as: LOPRESSOR  Take 1 tablet (50 mg total) by mouth 2 (two) times daily.     * mometasone-formoterol 200-5 mcg/actuation inhaler  Commonly known as: DULERA  Inhale 1 puff into the lungs 2 (two) times daily.     * DULERA 100-5 mcg/actuation Hfaa  Generic drug: mometasone-formoterol  INHALE 2 PUFFS INTO THE LUNGS TWICE DAILY     multivitamin per tablet  Commonly known as: THERAGRAN  Take 1 tablet by mouth every morning.     nicotine polacrilex 2 MG Lozg  1-2 per hour in place of cigarettes maximum of 20 per day.     NYSTOP powder  Generic drug: nystatin  APPLY TO AFFECTED AREA TWICE DAILY     ondansetron 4 MG tablet  Commonly known as: ZOFRAN  Take 1 tablet (4 mg total) by mouth every 8 (eight) hours as needed for Nausea.     ondansetron 4 MG Tbdl  Commonly known as: ZOFRAN-ODT  Take 1 tablet (4 mg total) by mouth every 6 (six) hours as needed (Nausea or vomiting).     pantoprazole 40 MG tablet  Commonly known as: PROTONIX  TAKE 1 TABLET(40 MG) BY MOUTH EVERY DAY     pravastatin 40 MG tablet  Commonly known as: PRAVACHOL  TAKE 1 TABLET(40 MG) BY MOUTH EVERY DAY     risperiDONE 3 MG Tab  Commonly known as:  RISPERDAL  take at bedtime     SITagliptin 100 MG Tab  Commonly known as: JANUVIA  Take 1 tablet (100 mg total) by mouth once daily.     VYVANSE 50 MG capsule  Generic drug: lisdexamfetamine  TK ONE C PO QAM         * This list has 6 medication(s) that are the same as other medications prescribed for you. Read the directions carefully, and ask your doctor or other care provider to review them with you.                Margarita Gaona MD  General Surgery  [facility]

## 2020-09-29 NOTE — ASSESSMENT & PLAN NOTE
Hopsital medicine is consulted for leucocytosis,no sign of active infection seen,she say she take occasionally steroid for sinus,leucocuytosis is improving,no growth in blood culture\ until now,her pain is improved at this time.

## 2020-09-29 NOTE — H&P
"[facility]  General Surgery  History & Physical    Patient Name: Audrey Natarajan  MRN: [id number]  Admission Date: 9/28/2020  Attending Physician: MD Matt  Primary Care Provider: Donaldo Pena MD    Patient information was obtained from patient and ER records.     Subjective:     Chief Complaint/Reason for Admission: RUQ pain    History of Present Illness:  Audrey Natarajan is a 51 y/o F with PMH of HTN, DMII, COPD, recurrent DVT/PE s/p IVC filter 2012 and now s/p laparoscopic cholecystectomy on 9/10/20 for symptomatic cholelithiasis who presents to the ED with RUQ pain, for which general surgery is consulted.      She states that over the past week she has developed pain in the RUQ, radiating from the back to the R costal margin. She is unsure of exacerbating factors but does not associate the pain with PO intake. She initially managed this with Tylenol and hot showers with some minimal improvement, but the pain worsened beginning Wednesday and has become "intolerable". She also states she has developed nausea and emesis over the past 4 days, emesis consisting of her PO intake without hematemesis. She also reports diarrhea without melena or hematochezia. She has had poor PO intake with associated anorexia. She denies fevers, chills, dysuria, frequency, or urgency. Of note, she has a chronic incisional hernia which she states is at her baseline, no skin changes, no worsening pain, no increased in size.     No current facility-administered medications on file prior to encounter.      Current Outpatient Medications on File Prior to Encounter   Medication Sig    acetaminophen (ARTHRITIS PAIN RELIEVER) 650 MG TbSR Take 650 mg by mouth. Pt states taking 2 -3 times a day    albuterol (ACCUNEB) 0.63 mg/3 mL Nebu Take 3 mLs (0.63 mg total) by nebulization every 8 (eight) hours as needed (wheezing). Rescue    albuterol (PROAIR HFA) 90 mcg/actuation inhaler INHALE 2 PUFFS BY MOUTH INTO THE LUNGS " EVERY 6 HOURS AS NEEDED FOR WHEEZING. RESCUE    aspirin (ECOTRIN) 81 MG EC tablet Take 81 mg by mouth once daily.     azelastine (ASTELIN) 137 mcg (0.1 %) nasal spray 1 spray (137 mcg total) by Nasal route 2 (two) times daily.    b complex vitamins tablet Take 1 tablet by mouth once daily.    blood-glucose meter kit To check BG once daily, to use with insurance preferred meter    carbamazepine (TEGRETOL) 200 mg tablet TK 2 TS PO BID    ciclopirox (LOPROX) 0.77 % Susp Apply topically once daily.    ciclopirox-nail lacquer removr 8 % Kit Apply 1 application topically once a week.    clotrimazole (LOTRIMIN) 1 % cream Apply topically 2 (two) times daily.    cyanocobalamin (VITAMIN B-12) 100 MCG tablet Take 1 tablet (100 mcg total) by mouth once daily.    cyclobenzaprine (FLEXERIL) 5 MG tablet TAKE 1 TABLET(5 MG) BY MOUTH THREE TIMES DAILY AS NEEDED FOR MUSCLE SPASMS    diphenhydrAMINE (BENADRYL) 50 MG capsule Take 1 capsule (50 mg total) by mouth every 6 (six) hours as needed for Itching or Allergies (Swelling of the throat, rash and shortness of breath).    DULERA 100-5 mcg/actuation HFAA INHALE 2 PUFFS INTO THE LUNGS TWICE DAILY    DUPIXENT 300 mg/2 mL Syrg inject 300mg SUBCUTANEOUSLY every other week    fluticasone propionate (FLONASE) 50 mcg/actuation nasal spray SHAKE LIQUID AND USE 1 SPRAY(50 MCG) IN EACH NOSTRIL TWICE DAILY    furosemide (LASIX) 20 MG tablet TAKE 1 TABLET(20 MG) BY MOUTH EVERY DAY    gabapentin (NEURONTIN) 600 MG tablet TAKE 1 TABLET(600 MG) BY MOUTH TWICE DAILY    HYDROcodone-acetaminophen (NORCO) 5-325 mg per tablet Take 1 tablet by mouth every 8 (eight) hours as needed (Severe pain not controlled by other medications).    HYDROcodone-acetaminophen (NORCO) 5-325 mg per tablet Take 1 tablet by mouth every 6 (six) hours as needed for Pain.    hydrOXYzine pamoate (VISTARIL) 25 MG Cap     lancets Misc To check BG once daily, to use with insurance preferred meter    lidocaine 3  % Crea Rub on lower back for pain as needed no more than 3 times a day    lisinopriL (PRINIVIL,ZESTRIL) 5 MG tablet TAKE 1 TABLET(5 MG) BY MOUTH EVERY DAY    loratadine (CLARITIN) 10 mg tablet Take 1 tablet (10 mg total) by mouth once daily.    meloxicam (MOBIC) 15 MG tablet TAKE 1 TABLET(15 MG) BY MOUTH EVERY DAY (Patient taking differently: daily as needed. )    metFORMIN (GLUCOPHAGE) 1000 MG tablet TAKE 1 TABLET(1000 MG) BY MOUTH TWICE DAILY WITH MEALS    metoprolol tartrate (LOPRESSOR) 50 MG tablet Take 1 tablet (50 mg total) by mouth 2 (two) times daily.    mometasone-formoterol (DULERA) 200-5 mcg/actuation inhaler Inhale 1 puff into the lungs 2 (two) times daily.    multivitamin (THERAGRAN) per tablet Take 1 tablet by mouth every morning.    nicotine polacrilex 2 MG Lozg 1-2 per hour in place of cigarettes maximum of 20 per day.    NYSTOP powder APPLY TO AFFECTED AREA TWICE DAILY    omega-3 fatty acids/fish oil (FISH OIL-OMEGA-3 FATTY ACIDS) 300-1,000 mg capsule Take 1 capsule by mouth once daily.    ondansetron (ZOFRAN) 4 MG tablet Take 1 tablet (4 mg total) by mouth every 8 (eight) hours as needed for Nausea.    ondansetron (ZOFRAN-ODT) 4 MG TbDL Take 1 tablet (4 mg total) by mouth every 6 (six) hours as needed (Nausea or vomiting).    pantoprazole (PROTONIX) 40 MG tablet TAKE 1 TABLET(40 MG) BY MOUTH EVERY DAY    pravastatin (PRAVACHOL) 40 MG tablet TAKE 1 TABLET(40 MG) BY MOUTH EVERY DAY    risperidone (RISPERDAL) 3 MG Tab take at bedtime    SITagliptin (JANUVIA) 100 MG Tab Take 1 tablet (100 mg total) by mouth once daily.    VYVANSE 50 mg capsule TK ONE C PO QAM       Review of patient's allergies indicates:   Allergen Reactions    Morphine Other (See Comments)     Patient had a psychotic episode after taking Morphine  Agitation, hallucinations    Penicillins Anaphylaxis     itching    Januvia [sitagliptin] Hives       Past Medical History:   Diagnosis Date    ADHD (attention deficit  "hyperactivity disorder)     Arthritis     Asthma     Bipolar 1 disorder     Cataract     COPD (chronic obstructive pulmonary disease)     Coronary artery disease     A fib    Depression     bipolar manic depresson    Diabetes mellitus     DVT of lower extremity, bilateral July 2013    bilateral LE DVT. [place] filter placed.     Encounter for blood transfusion     History of blood clots 1. Left Leg=2003; 2.Bilateral Groin=Blood Clots= 5 or 6/ 2013 & 7/2013; 3. LLL of Lung=7/2013;  4. Lt. Lower Leg=7/2013.     Pt. had 1st Blood Clot after [id number], & Last=2013. [place] Filter= Rt.Lateral Neck.    HTN (hypertension) 6/6/2013    Pt states that she does not have hypertension    Hypercholesteremia     Irregular heartbeat     Neuromuscular disorder     neuropathy feet    PE (pulmonary embolism) July 2013     bilat LE DVT.     Restless leg syndrome      Past Surgical History:   Procedure Laterality Date    ABDOMINAL SURGERY      BILATERAL OOPHORECTOMY Bilateral 1/12/2015    gastric sleeve  2016    Green' s filter Right 7/4/2012    Right Neck & Tunneled Down.    HERNIA REPAIR      "[place] of Hernias Repaires around th Belly Button.", pt. states    LAPAROSCOPIC CHOLECYSTECTOMY N/A 9/10/2020    Procedure: CHOLECYSTECTOMY, LAPAROSCOPIC;  Surgeon: Montrell Gutiererz MD;  Location: [facility];  Service: General;  Laterality: N/A;  RN PREOP 9/9----COVID Negative  9/9    OOPHORECTOMY      OVARIAN CYST REMOVAL  3/13/2014    MT REMOVAL OF OVARY/TUBE(S)      SPLENECTOMY, TOTAL  July 2003    TONSILLECTOMY      as a child    TYMPANOSTOMY TUBE PLACEMENT  1976    VEIN SURGERY  2003    Lt leg     Family History     Problem Relation (Age of Onset)    Cataracts Father    Diabetes Father, Paternal Grandfather    Heart disease Father, Paternal Grandfather    Hypertension Father    No Known Problems Mother, Sister, Brother, Maternal Aunt, Maternal Uncle, Paternal Aunt, Paternal Uncle, Maternal " Grandfather    Ovarian cancer Maternal Grandmother, Paternal Grandmother        Tobacco Use    Smoking status: Current Every Day Smoker     Packs/day: 0.50     Years: 25.00     Pack years: 12.50     Types: Cigarettes    Smokeless tobacco: Never Used    Tobacco comment: 8/27/20 down to 2 cigarettes   Substance and Sexual Activity    Alcohol use: No     Alcohol/week: 0.0 standard drinks    Drug use: No    Sexual activity: Yes     Partners: Male        Review of Systems   Constitutional: Positive for appetite change. Negative for chills and fever.   HENT: Positive for congestion. Negative for sore throat.    Respiratory: Negative for cough, shortness of breath and wheezing.    Cardiovascular: Negative for chest pain and palpitations.   Gastrointestinal:        See HPI   Genitourinary: Negative for dysuria, frequency, hematuria and urgency.   Musculoskeletal: Positive for back pain.   Skin: Negative for color change, rash and wound.   Neurological: Negative for dizziness, weakness, light-headedness and numbness.   Objective:     Vital Signs (Most Recent):  Temp: 98.2 °F (36.8 °C) (09/28/20 1904)  Pulse: 93 (09/28/20 2337)  Resp: 18 (09/28/20 2159)  BP: 130/62 (09/28/20 2342)  SpO2: 96 % (09/28/20 2337) Vital Signs (24h Range):  Temp:  [98.2 °F (36.8 °C)] 98.2 °F (36.8 °C)  Pulse:  [87-97] 93  Resp:  [18-22] 18  SpO2:  [94 %-97 %] 96 %  BP: (130-175)/(62-79) 130/62     Weight: 107 kg (236 lb)  Body mass index is 38.09 kg/m².    Physical Exam  Constitutional:       Appearance: Normal appearance.   Eyes:      General: No scleral icterus.     Extraocular Movements: Extraocular movements intact.   Neck:      Musculoskeletal: Normal range of motion and neck supple.   Cardiovascular:      Rate and Rhythm: Normal rate and regular rhythm.   Pulmonary:      Effort: Pulmonary effort is normal. No respiratory distress.      Breath sounds: No wheezing.   Abdominal:      Comments: Soft, TTP in RUQ without rebound or guarding.  Incisional hernia, R lateral to midline is tender to palpation without overlying skin changes.   Musculoskeletal:      Comments: LLE edema, chronic per patient   Neurological:      Mental Status: She is alert and oriented to person, place, and time.      Motor: No weakness.   Psychiatric:         Mood and Affect: Mood normal.         Behavior: Behavior normal.   Significant Labs:  CBC:   Recent Labs   Lab 09/28/20 1924   WBC 17.96*   RBC 4.29   HGB 12.5   HCT 38.3   *   MCV 89   MCH 29.1   MCHC 32.6     CMP:   Recent Labs   Lab 09/28/20 1924   *   CALCIUM 9.1   ALBUMIN 4.0   PROT 7.0      K 3.8   CO2 26   CL 98   BUN 7   CREATININE 0.7   ALKPHOS 101   ALT 19   AST 15   BILITOT 0.1     LFTs:   Recent Labs   Lab 09/28/20 1924   ALT 19   AST 15   ALKPHOS 101   BILITOT 0.1   PROT 7.0   ALBUMIN 4.0       Significant Diagnostics:  CTA C/A/P: Reviewed, some fat stranding in gallbladder fossa without obvious fluid collection, normal biliary system. 3 cm incisional hernia just inferior and lateral to umbilicus with fat, no bowel.    Assessment/Plan:     Active Diagnoses:    Diagnosis Date Noted POA    PRINCIPAL PROBLEM:  RUQ pain [R10.11] 09/28/2020 Unknown      Problems Resolved During this Admission:     VTE Risk Mitigation (From admission, onward)    None        Audrey Natarajan is a 51 y/o F with PMH of HTN, DMII, COPD, recurrent DVT/PE s/p IVC filter 2012 and now s/p laparoscopic cholecystectomy on 9/10/20 for symptomatic cholelithiasis who presents to the ED with RUQ pain, for which general surgery is consulted. AVSS, leukocytosis of 18k, normal LFT's and lipase, CT reviewed as above, low suspicion for bile leak given lack of CT findings, negative UA, CT chest negative for pneumonia. Low suspicion that incisional hernia is cause of symptoms based on H&P/CT findings.    - Admit to observation  - Obtain RUQ U/S to r/o biloma/leak  - Zosyn, repeat CBC, CMP in am. F/u blood cultures  - NPO okay for  ice chips, mIVF  - Resume home meds, sliding scale insulin q6 accuchecks    DVT ppx: Lovenox, SCDs  Dispo: Admit to obs      Parker Gruber MD  General Surgery  [facility]

## 2020-09-29 NOTE — PLAN OF CARE
"SW met with patient to complete discharge planning assessment. Prior to beginning the discharge planning assessment, patient verified name and date of birth. SW educated patient on the discharge process and explained that discharge planning begins at admission. SW reviewed contents of the "[facility] Packet" emphasizing, "Help at home", "Managing your health", and "Your preferences". Patient stated that her , Delroy is her help at home. SW wrote name and phone number on white communication board.  Donaldo Pena MD      [id number] - [facility] [address] [facility] [address] AT [facility] [address] [facility] [address] [facility]  [address] [facility] [address]  Phone: [phone] Fax: [phone]    [facility] [address] [address]  Phone: [phone] Fax: [phone]    [id number] [place], [place] - [address]  [address]  [address]  [facility] [address]  Phone: [phone] Fax: [phone]    Extended Emergency Contact Information  Primary Emergency Contact: Delroy Og  Address: [facility] [place] [id number] [facility]  Home Phone: [phone]  Mobile Phone: [phone]  Relation: Friend  Secondary Emergency Contact: Anitra Cain   [facility]  Home Phone: [phone]  Relation: Sister       09/29/20 1311   Discharge Assessment   Assessment Type Discharge Planning Assessment   Confirmed/corrected address and phone number on facesheet? Yes   Assessment information obtained from? Patient   Communicated expected length of stay with patient/caregiver yes   Prior to hospitilization cognitive status: Alert/Oriented   Prior to hospitalization functional status: Independent   Current cognitive status: Alert/Oriented   Current Functional Status: Independent   Facility Arrived From: Home   Lives With spouse  (Delroy) "   Able to Return to Prior Arrangements yes   Is patient able to care for self after discharge? Yes   Readmission Within the Last 30 Days no previous admission in last 30 days   Patient currently being followed by outpatient case management? No   Equipment Currently Used at Home wheelchair;cane, straight   Do you have any problems affording any of your prescribed medications? No   Is the patient taking medications as prescribed? yes   Does the patient have transportation home? Yes   Transportation Anticipated car, drives self   Does the patient receive services at the [facility]? No   Discharge Plan A Home   Discharge Plan B Home   DME Needed Upon Discharge  none   Patient/Family in Agreement with Plan yes

## 2020-09-29 NOTE — NURSING
VSS, NAD, Afebrile, voids without difficulty - up to toilet. Discharge instructions and prescription paper explained and handed to pt. Pt verbalizes understanding. IV access dc. Discharged home via wheelchair, surgical mask in place.

## 2020-09-29 NOTE — PLAN OF CARE
"EDUCATION:  SW provided patient with educational information on Post Op.  Information was reviewed and placed in "My Healthcare Packet" to be brought home for use as a resource after discharge.  Information included: signs and symptoms to look for and call the doctor if experiencing, and symptoms that may indicate a medical emergency: CALL 911.  All questions answered.  Teach back method used. Patient stated that he/she will remember the following signs and symptoms: fever of 100.5 and blood in stool. MELVI gave patient follow up appointments for PCP and Ortho Surgeon. MELVI spoke with nurse Ross and informed her that patient was ready for discharge from  standpoint.        09/29/20 130   Final Note   Assessment Type Final Discharge Note   Anticipated Discharge Disposition Home   What phone number can be called within the next 1-3 days to see how you are doing after discharge? [phone]   Hospital Follow Up  Appt(s) scheduled? Yes   Discharge plans and expectations educations in teach back method with documentation complete? Yes   Right Care Referral Info   Post Acute Recommendation No Care   Post-Acute Status   Post-Acute Authorization Other   Other Status No Post-Acute Service Needs   Discharge Delays None known at this time                                                 "

## 2020-09-29 NOTE — ED TRIAGE NOTES
Right sided mid back pain that radiates through to abdomen. Ashley 9/10. N/v/d. P[ain began Wednesday

## 2020-09-29 NOTE — HPI
"This is a 51 y/o female with a PMHx of Diabetes mellitus, Hypertension, Hypercholesteremia, COPD, DVT, PE, Blood clots, Irregular heartbeat, CAD, and Neuromuscular disorder. She presents to the ED with a CC of back pain that has been ongoing for 5 days. Back pain starts in her right lower side, goes up to her shoulder blade, and "goes all the way through me." Pain has gotten worse over the past 2 days, and sitting aggravates pain. Associated symptoms are nausea and vomiting anytime she eats for 4 days and diarrhea. Patient had Laparoscopic cholecystectomy on 09/20/2020. She has been resting since. Had check up with Dr. Suri Rivers MD. She mentioned her symptoms, and they recommended Extra strength Tylenol without relief. Patient took nausea medication with relief. She has been drinking plenty of fluids, but can not keep food down. Patient is not on blood thinners. Has had Green's filter in since 07/04/2012. She has cough from normal allergies, no complaint. Allergies to Morphine, Penicillins, and Januvia.   "

## 2020-09-29 NOTE — ED PROVIDER NOTES
"Encounter Date: 9/28/2020    SCRIBE #1 NOTE: I, Perla Merry, am scribing for, and in the presence of,  Antonino Arreola MD. I have scribed the entire note.       History     Chief Complaint   Patient presents with    Back Pain     Patient c/o right lower back pain that radiates up her back and "through me in my right shoulder blade" x 1 day with n/v and diarrhea.  Reports S/P cholecystectomy on 09/10/2020.  Reports that pain is coming in waves and feels the same as the gallstone pain she had prior to surgery.  Denies injury or fever.     This is a 49 y/o female with a PMHx of Diabetes mellitus, Hypertension, Hypercholesteremia, COPD, DVT, PE, Blood clots, Irregular heartbeat, CAD, and Neuromuscular disorder. She presents to the ED with a CC of back pain that has been ongoing for 5 days. Back pain starts in her right lower side, goes up to her shoulder blade, and "goes all the way through me." Pain has gotten worse over the past 2 days, and sitting aggravates pain. Associated symptoms are nausea and vomiting anytime she eats for 4 days and diarrhea. Patient had Laparoscopic cholecystectomy on 09/20/2020. She has been resting since. Had check up with Dr. Suri Rivers MD. She mentioned her symptoms, and they recommended Extra strength Tylenol without relief. Patient took nausea medication with relief. She has been drinking plenty of fluids, but can not keep food down. Patient is not on blood thinners. Has had Green's filter in since 07/04/2012. She has cough from normal allergies, no complaint. Allergies to Morphine, Penicillins, and Januvia.     The history is provided by the patient.     Review of patient's allergies indicates:   Allergen Reactions    Morphine Other (See Comments)     Patient had a psychotic episode after taking Morphine  Agitation, hallucinations    Penicillins Anaphylaxis     itching    Januvia [sitagliptin] Hives     Past Medical History:   Diagnosis Date    ADHD (attention deficit " "hyperactivity disorder)     Arthritis     Asthma     Bipolar 1 disorder     Cataract     COPD (chronic obstructive pulmonary disease)     Coronary artery disease     A fib    Depression     bipolar manic depresson    Diabetes mellitus     DVT of lower extremity, bilateral 2013    bilateral LE DVT. [place] filter placed.     Encounter for blood transfusion     History of blood clots 1. Left Leg=; 2.Bilateral Groin=Blood Clots=  or 2013 & 2013; 3. LLL of Lung=2013;  4. Lt. Lower Leg=2013.     Pt. had 1st Blood Clot after Ahmjhaoyzzrx=2047, & Last=. [place] Filter= Rt.Lateral Neck.    HTN (hypertension) 2013    Pt states that she does not have hypertension    Hypercholesteremia     Irregular heartbeat     Neuromuscular disorder     neuropathy feet    PE (pulmonary embolism) 2013     bilat LE DVT.     Restless leg syndrome      Past Surgical History:   Procedure Laterality Date    ABDOMINAL SURGERY      BILATERAL OOPHORECTOMY Bilateral 2015    gastric sleeve  2016    Green' s filter Right 2012    Right Neck & Tunneled Down.    HERNIA REPAIR      "[place] of Hernias Repaires around th [place].", pt. states    LAPAROSCOPIC CHOLECYSTECTOMY N/A 9/10/2020    Procedure: CHOLECYSTECTOMY, LAPAROSCOPIC;  Surgeon: Montrell Gutierrez MD;  Location: [facility];  Service: General;  Laterality: N/A;  RN PREOP ----COVID Negative      OOPHORECTOMY      OVARIAN CYST REMOVAL  3/13/2014    [place] REMOVAL OF OVARY/TUBE(S)      SPLENECTOMY, TOTAL  2003    TONSILLECTOMY      as a child    TYMPANOSTOMY TUBE PLACEMENT      VEIN SURGERY      Lt leg     Family History   Problem Relation Age of Onset    Hypertension Father     Diabetes Father     Heart disease Father     Cataracts Father     Diabetes Paternal Grandfather     Heart disease Paternal Grandfather     No Known Problems Mother     Ovarian cancer Maternal Grandmother          from " this. ? age     No Known Problems Sister     No Known Problems Brother     No Known Problems Maternal Aunt     No Known Problems Maternal Uncle     No Known Problems Paternal Aunt     No Known Problems Paternal Uncle     No Known Problems Maternal Grandfather     Ovarian cancer Paternal Grandmother     Uterine cancer Neg Hx     Breast cancer Neg Hx     Colon cancer Neg Hx     Amblyopia Neg Hx     Blindness Neg Hx     Cancer Neg Hx     Glaucoma Neg Hx     Macular degeneration Neg Hx     Retinal detachment Neg Hx     Strabismus Neg Hx     Stroke Neg Hx     Thyroid disease Neg Hx      Social History     Tobacco Use    Smoking status: Current Every Day Smoker     Packs/day: 0.50     Years: 25.00     Pack years: 12.50     Types: Cigarettes    Smokeless tobacco: Never Used    Tobacco comment: 8/27/20 down to 2 cigarettes   Substance Use Topics    Alcohol use: No     Alcohol/week: 0.0 standard drinks    Drug use: No     Review of Systems   Constitutional: Negative for chills and fever.   HENT: Negative for congestion and sore throat.    Eyes: Negative for pain and visual disturbance.   Respiratory: Negative for chest tightness and shortness of breath.    Cardiovascular: Negative for chest pain.   Gastrointestinal: Positive for diarrhea, nausea and vomiting.   Endocrine: Negative for polydipsia and polyphagia.   Genitourinary: Negative for dysuria and flank pain.   Musculoskeletal: Positive for back pain. Negative for neck pain and neck stiffness.   Skin: Negative for rash.   Allergic/Immunologic: Negative for immunocompromised state.   Neurological: Negative for dizziness and weakness.   Hematological: Does not bruise/bleed easily.   Psychiatric/Behavioral: Negative for agitation and behavioral problems.   All other systems reviewed and are negative.      Physical Exam     Initial Vitals [09/28/20 1904]   BP Pulse Resp Temp SpO2   (!) 175/76 97 (!) 22 98.2 °F (36.8 °C) 97 %      MAP       --          Physical Exam    Nursing note and vitals reviewed.  Constitutional: Vital signs are normal. She appears well-developed and well-nourished. She is Obese .   HENT:   Head: Normocephalic.   Right Ear: External ear normal.   Left Ear: External ear normal.   Mouth/Throat: Oropharynx is clear and moist.   Eyes: EOM are normal. Pupils are equal, round, and reactive to light.   Neck: Normal range of motion.   Cardiovascular: Normal rate and regular rhythm.   Pulmonary/Chest: Breath sounds normal.   Abdominal: Soft. She exhibits no distension. There is abdominal tenderness in the right upper quadrant and right lower quadrant.   Genitourinary:    Genitourinary Comments: Right flank tenderness     Musculoskeletal: Normal range of motion.   Neurological: She is alert and oriented to person, place, and time. GCS eye subscore is 4. GCS verbal subscore is 5. GCS motor subscore is 6.   Skin: Skin is warm and dry. Capillary refill takes less than 2 seconds.   Psychiatric: She has a normal mood and affect. Thought content normal.         ED Course   Procedures  Labs Reviewed   CBC W/ AUTO DIFFERENTIAL - Abnormal; Notable for the following components:       Result Value    WBC 17.96 (*)     Platelets 540 (*)     Basophil% 2.0 (*)     Platelet Estimate Increased (*)     All other components within normal limits   COMPREHENSIVE METABOLIC PANEL - Abnormal; Notable for the following components:    Glucose 194 (*)     All other components within normal limits   URINALYSIS, REFLEX TO URINE CULTURE - Abnormal; Notable for the following components:    Leukocytes, UA Trace (*)     All other components within normal limits    Narrative:     Specimen Source->Urine   URINALYSIS MICROSCOPIC - Abnormal; Notable for the following components:    WBC, UA 8 (*)     Bacteria Few (*)     All other components within normal limits    Narrative:     Specimen Source->Urine   LACTIC ACID, PLASMA - Abnormal; Notable for the following components:    Lactate  (Lactic Acid) 2.4 (*)     All other components within normal limits   CULTURE, BLOOD   CULTURE, BLOOD   LIPASE   APTT   PROTIME-INR     EKG Readings: (Independently Interpreted)   ekg done at 1937 showing nsr with low voltage qrs with rate of 87.  No ST elevation major T-wave abnormalities.  Normal axis.  Compared to previous and similar.  Nonspecific EKG.       Imaging Results          CTA Chest Abdomen Pelvis (Final result)  Result time 09/28/20 21:08:25    Final result by Shalom Bro MD (09/28/20 21:08:25)                 Impression:      1. No evidence of aortic aneurysm or dissection.  2. No acute intrathoracic or intra-abdominal abnormalities identified.  3. Small 4 mm right lower lobe pulmonary nodule.  For a solid nodule <6 mm, Fleischner Society 2017 guidelines recommend no routine follow up for a low risk patient, or follow-up with non-contrast chest CT at 12 months in a high risk patient.  4. Postsurgical changes of cholecystectomy, splenectomy, and sleeve gastrectomy.  5. Additional findings as detailed above.      Electronically signed by: Shalom Bro MD  Date:    09/28/2020  Time:    21:08             Narrative:    EXAMINATION:  CTA CHEST ABDOMEN PELVIS    CLINICAL HISTORY:  Thoracic aortic aneurysm suspected, initial exam;    TECHNIQUE:  CTA chest abdomen and pelvis were obtained following administration of 100 cc Omnipaque 350 IV contrast.    COMPARISON:  Recent CT abdomen and pelvis from 09/06/2020.    FINDINGS:  No evidence of aortic aneurysm or dissection.  Aortic branch vessels are widely patent.  No evidence of pulmonary thromboembolus. Small 7 mm nodule is visualized within the right thyroid lobe.  Heart is normal in size without pericardial effusion.  No significant abnormalities are seen along the esophageal course.  Few scattered prominent mediastinal lymph nodes are seen which do not meet CT criteria for enlargement.  Airways are patent.  Lungs are symmetrically expanded.  No  evidence of focal consolidation or pleural effusion.  Small calcified granuloma is noted within the right middle lobe.  Small 4 mm micronodule is visualized within the superior segment of the right lower lobe.    Liver is enlarged measuring 24 cm.  There is no intra-or extrahepatic biliary ductal dilatation.  Gallbladder has been removed.  Postsurgical changes of sleeve gastrectomy and splenectomy are seen.  Soft tissue nodularity seen within the left upper quadrant which may represent residual or regenerative splenic tissue.  Pancreas and adrenal glands are unremarkable.    Kidneys show no evidence of hydronephrosis.  No abnormalities are seen along the ureteral courses.  Urinary bladder is unremarkable.  Uterus is small versus postsurgical changes of partial hysterectomy.    Appendix is visualized and is unremarkable.  The visualized loops of small and large bowel show no evidence of obstruction or inflammation.  Diverticulosis is seen within the descending colon and proximal sigmoid colon.  No free air or free fluid.    Aorta tapers normally.  IVC filter is seen.    No acute osseous abnormality identified.  Degenerative changes are seen within the spine.  Postsurgical changes with stranding and suspected scarring are seen involving the anterior abdominal wall.  No focal fluid collections are seen.                                 Medical Decision Making:   Initial Assessment:   This is a 51 y/o female with a PMHx of Diabetes mellitus, Hypertension, Hypercholesteremia, COPD, DVT, PE, Blood clots, Irregular heartbeat, CAD, and Neuromuscular disorder. She presents to the ED with a CC of back pain that has been ongoing for 5 days. Labs and EKG will be ordered. Patient will be treated for symptoms and reassessed.  Differential includes postop complication, abscess, muscle strain, dissection, pancreatitis, obstruction.  IV Dilaudid.  Zofran.  IV fluids.  Will reassess.    Patient has elevated white count.  Added on  lactic acid and blood cultures.  White count is more elevated than previous.  Also adding on Cipro and Flagyl.    Lactic acid is in the low 2s.  CTA chest abdomen pelvis does not show anything acute.  I spoke with Dr. Gruber with General surgery who will come down and evaluate the patient.  Patient is still in pain.  Repeat Dilaudid.    I spoke with Dr. Gruber and patient will be admitted for further workup and management.   Independently Interpreted Test(s):   I have ordered and independently interpreted EKG Reading(s) - see prior notes  Clinical Tests:   Lab Tests: Ordered and Reviewed  Medical Tests: Ordered and Reviewed            Scribe Attestation:   Scribe #1: I performed the above scribed service and the documentation accurately describes the services I performed. I attest to the accuracy of the note.                      Clinical Impression:       ICD-10-CM ICD-9-CM   1. Right upper quadrant abdominal pain  R10.11 789.01   2. Pain  R52 780.96                          ED Disposition Condition    Observation        I, antonino arreola, personally performed the services described in this documentation. All medical record entries made by the scribe were at my direction and in my presence.  I have reviewed the chart and agree that the record reflects my personal performance and is accurate and complete.                        Antonino Arreola MD  09/28/20 5878       Antonino Arreola MD  09/28/20 3636

## 2020-09-29 NOTE — SUBJECTIVE & OBJECTIVE
"Past Medical History:   Diagnosis Date    ADHD (attention deficit hyperactivity disorder)     Arthritis     Asthma     Bipolar 1 disorder     Cataract     COPD (chronic obstructive pulmonary disease)     Coronary artery disease     A fib    Depression     bipolar manic depresson    Diabetes mellitus     DVT of lower extremity, bilateral July 2013    bilateral LE DVT. [place] filter placed.     Encounter for blood transfusion     History of blood clots 1. Left Leg=2003; 2.Bilateral Groin=Blood Clots= 5 or 6/ 2013 & 7/2013; 3. LLL of Lung=7/2013;  4. Lt. Lower Leg=7/2013.     Pt. had 1st Blood Clot after Swsxzxsrvbes=5601, & Last=2013. [place] Filter= Rt.Lateral Neck.    HTN (hypertension) 6/6/2013    Pt states that she does not have hypertension    Hypercholesteremia     Irregular heartbeat     Neuromuscular disorder     neuropathy feet    PE (pulmonary embolism) July 2013     bilat LE DVT.     Restless leg syndrome        Past Surgical History:   Procedure Laterality Date    ABDOMINAL SURGERY      BILATERAL OOPHORECTOMY Bilateral 1/12/2015    gastric sleeve  2016    Green' s filter Right 7/4/2012    Right Neck & Tunneled Down.    HERNIA REPAIR      "[place] of Hernias Repaires around th [address].", pt. states    LAPAROSCOPIC CHOLECYSTECTOMY N/A 9/10/2020    Procedure: CHOLECYSTECTOMY, LAPAROSCOPIC;  Surgeon: Montrell Gutierrez MD;  Location[facility] OR;  Service: General;  Laterality: N/A;  RN PREOP 9/9----COVID Negative  9/9    OOPHORECTOMY      OVARIAN CYST REMOVAL  3/13/2014    MD REMOVAL OF OVARY/TUBE(S)      SPLENECTOMY, TOTAL  July 2003    TONSILLECTOMY      as a child    TYMPANOSTOMY TUBE PLACEMENT  1976    VEIN SURGERY  2003    Lt leg       Review of patient's allergies indicates:   Allergen Reactions    Morphine Other (See Comments)     Patient had a psychotic episode after taking Morphine  Agitation, hallucinations    Penicillins Anaphylaxis     itching    Januvia " [sitagliptin] Hives       No current facility-administered medications on file prior to encounter.      Current Outpatient Medications on File Prior to Encounter   Medication Sig    acetaminophen (ARTHRITIS PAIN RELIEVER) 650 MG TbSR Take 650 mg by mouth. Pt states taking 2 -3 times a day    albuterol (ACCUNEB) 0.63 mg/3 mL Nebu Take 3 mLs (0.63 mg total) by nebulization every 8 (eight) hours as needed (wheezing). Rescue    albuterol (PROAIR HFA) 90 mcg/actuation inhaler INHALE 2 PUFFS BY MOUTH INTO THE LUNGS EVERY 6 HOURS AS NEEDED FOR WHEEZING. RESCUE    aspirin (ECOTRIN) 81 MG EC tablet Take 81 mg by mouth once daily.     azelastine (ASTELIN) 137 mcg (0.1 %) nasal spray 1 spray (137 mcg total) by Nasal route 2 (two) times daily.    b complex vitamins tablet Take 1 tablet by mouth once daily.    blood-glucose meter kit To check BG once daily, to use with insurance preferred meter    carbamazepine (TEGRETOL) 200 mg tablet TK 2 TS PO BID    ciclopirox (LOPROX) 0.77 % Susp Apply topically once daily.    ciclopirox-nail lacquer removr 8 % Kit Apply 1 application topically once a week.    clotrimazole (LOTRIMIN) 1 % cream Apply topically 2 (two) times daily.    cyanocobalamin (VITAMIN B-12) 100 MCG tablet Take 1 tablet (100 mcg total) by mouth once daily.    cyclobenzaprine (FLEXERIL) 5 MG tablet TAKE 1 TABLET(5 MG) BY MOUTH THREE TIMES DAILY AS NEEDED FOR MUSCLE SPASMS    diphenhydrAMINE (BENADRYL) 50 MG capsule Take 1 capsule (50 mg total) by mouth every 6 (six) hours as needed for Itching or Allergies (Swelling of the throat, rash and shortness of breath).    DULERA 100-5 mcg/actuation HFAA INHALE 2 PUFFS INTO THE LUNGS TWICE DAILY    DUPIXENT 300 mg/2 mL Syrg inject 300mg SUBCUTANEOUSLY every other week    fluticasone propionate (FLONASE) 50 mcg/actuation nasal spray SHAKE LIQUID AND USE 1 SPRAY(50 MCG) IN EACH NOSTRIL TWICE DAILY    furosemide (LASIX) 20 MG tablet TAKE 1 TABLET(20 MG) BY MOUTH  EVERY DAY    gabapentin (NEURONTIN) 600 MG tablet TAKE 1 TABLET(600 MG) BY MOUTH TWICE DAILY    HYDROcodone-acetaminophen (NORCO) 5-325 mg per tablet Take 1 tablet by mouth every 8 (eight) hours as needed (Severe pain not controlled by other medications).    HYDROcodone-acetaminophen (NORCO) 5-325 mg per tablet Take 1 tablet by mouth every 6 (six) hours as needed for Pain.    hydrOXYzine pamoate (VISTARIL) 25 MG Cap     lancets Misc To check BG once daily, to use with insurance preferred meter    lidocaine 3 % Crea Rub on lower back for pain as needed no more than 3 times a day    lisinopriL (PRINIVIL,ZESTRIL) 5 MG tablet TAKE 1 TABLET(5 MG) BY MOUTH EVERY DAY    loratadine (CLARITIN) 10 mg tablet Take 1 tablet (10 mg total) by mouth once daily.    meloxicam (MOBIC) 15 MG tablet TAKE 1 TABLET(15 MG) BY MOUTH EVERY DAY (Patient taking differently: daily as needed. )    metFORMIN (GLUCOPHAGE) 1000 MG tablet TAKE 1 TABLET(1000 MG) BY MOUTH TWICE DAILY WITH MEALS    metoprolol tartrate (LOPRESSOR) 50 MG tablet Take 1 tablet (50 mg total) by mouth 2 (two) times daily.    mometasone-formoterol (DULERA) 200-5 mcg/actuation inhaler Inhale 1 puff into the lungs 2 (two) times daily.    multivitamin (THERAGRAN) per tablet Take 1 tablet by mouth every morning.    nicotine polacrilex 2 MG Lozg 1-2 per hour in place of cigarettes maximum of 20 per day.    NYSTOP powder APPLY TO AFFECTED AREA TWICE DAILY    omega-3 fatty acids/fish oil (FISH OIL-OMEGA-3 FATTY ACIDS) 300-1,000 mg capsule Take 1 capsule by mouth once daily.    ondansetron (ZOFRAN) 4 MG tablet Take 1 tablet (4 mg total) by mouth every 8 (eight) hours as needed for Nausea.    ondansetron (ZOFRAN-ODT) 4 MG TbDL Take 1 tablet (4 mg total) by mouth every 6 (six) hours as needed (Nausea or vomiting).    pantoprazole (PROTONIX) 40 MG tablet TAKE 1 TABLET(40 MG) BY MOUTH EVERY DAY    pravastatin (PRAVACHOL) 40 MG tablet TAKE 1 TABLET(40 MG) BY MOUTH  EVERY DAY    risperidone (RISPERDAL) 3 MG Tab take at bedtime    SITagliptin (JANUVIA) 100 MG Tab Take 1 tablet (100 mg total) by mouth once daily.    VYVANSE 50 mg capsule TK ONE C PO QAM     Family History     Problem Relation (Age of Onset)    Cataracts Father    Diabetes Father, Paternal Grandfather    Heart disease Father, Paternal Grandfather    Hypertension Father    No Known Problems Mother, Sister, Brother, Maternal Aunt, Maternal Uncle, Paternal Aunt, Paternal Uncle, Maternal Grandfather    Ovarian cancer Maternal Grandmother, Paternal Grandmother        Tobacco Use    Smoking status: Current Every Day Smoker     Packs/day: 0.50     Years: 25.00     Pack years: 12.50     Types: Cigarettes    Smokeless tobacco: Never Used    Tobacco comment: 8/27/20 down to 2 cigarettes   Substance and Sexual Activity    Alcohol use: No     Alcohol/week: 0.0 standard drinks    Drug use: No    Sexual activity: Yes     Partners: Male     Review of Systems   Constitutional: Positive for activity change and appetite change.   HENT: Negative for congestion and dental problem.    Eyes: Positive for discharge. Negative for pain.   Respiratory: Negative for apnea and choking.    Cardiovascular: Negative for chest pain and leg swelling.   Gastrointestinal: Negative for anal bleeding.   Endocrine: Negative for cold intolerance and polydipsia.   Genitourinary: Negative for difficulty urinating.   Musculoskeletal: Positive for arthralgias and myalgias. Negative for back pain.   Skin: Negative for color change and pallor.   Allergic/Immunologic: Negative for environmental allergies and food allergies.   Neurological: Negative for dizziness and headaches.   Hematological: Negative for adenopathy. Does not bruise/bleed easily.     Objective:     Vital Signs (Most Recent):  Temp: 98 °F (36.7 °C) (09/29/20 0732)  Pulse: 75 (09/29/20 0752)  Resp: 18 (09/29/20 0844)  BP: (!) 153/66 (09/29/20 0732)  SpO2: (!) 94 % (09/29/20 0752)  Vital Signs (24h Range):  Temp:  [97.5 °F (36.4 °C)-98.2 °F (36.8 °C)] 98 °F (36.7 °C)  Pulse:  [74-97] 75  Resp:  [16-22] 18  SpO2:  [94 %-97 %] 94 %  BP: (123-175)/(54-79) 153/66     Weight: 105.3 kg (232 lb 3.2 oz)  Body mass index is 37.48 kg/m².    Physical Exam  Constitutional:       Appearance: Normal appearance.   HENT:      Right Ear: Tympanic membrane normal.      Left Ear: Tympanic membrane normal.      Mouth/Throat:      Mouth: Mucous membranes are dry.   Eyes:      Extraocular Movements: Extraocular movements intact.   Neck:      Musculoskeletal: Normal range of motion and neck supple.   Cardiovascular:      Rate and Rhythm: Normal rate and regular rhythm.   Pulmonary:      Effort: Pulmonary effort is normal.      Breath sounds: Normal breath sounds.   Abdominal:      General: Abdomen is flat.      Palpations: Abdomen is soft.   Musculoskeletal: Normal range of motion.         General: No swelling or tenderness.   Skin:     General: Skin is warm and dry.   Neurological:      General: No focal deficit present.      Mental Status: She is alert and oriented to person, place, and time.   Psychiatric:         Mood and Affect: Mood normal.         Behavior: Behavior normal.         Significant Labs:   BMP:   Recent Labs   Lab 09/29/20 0416   *      K 4.2      CO2 27   BUN 6   CREATININE 0.6   CALCIUM 8.5*     CBC:   Recent Labs   Lab 09/28/20 1924 09/29/20 0416   WBC 17.96* 13.85*   HGB 12.5 12.0   HCT 38.3 37.9   * 490*     CMP:   Recent Labs   Lab 09/28/20 1924 09/29/20 0416    137   K 3.8 4.2   CL 98 100   CO2 26 27   * 130*   BUN 7 6   CREATININE 0.7 0.6   CALCIUM 9.1 8.5*   PROT 7.0 6.2   ALBUMIN 4.0 3.5   BILITOT 0.1 0.3   ALKPHOS 101 83   AST 15 13   ALT 19 12   ANIONGAP 12 10   EGFRNONAA >60 >60       Significant Imaging: reviewed.

## 2020-10-01 ENCOUNTER — LAB VISIT (OUTPATIENT)
Dept: LAB | Facility: HOSPITAL | Age: 50
End: 2020-10-01
Payer: MEDICAID

## 2020-10-01 ENCOUNTER — OFFICE VISIT (OUTPATIENT)
Dept: RHEUMATOLOGY | Facility: CLINIC | Age: 50
End: 2020-10-01
Payer: MEDICAID

## 2020-10-01 VITALS
DIASTOLIC BLOOD PRESSURE: 69 MMHG | BODY MASS INDEX: 37.98 KG/M2 | HEART RATE: 71 BPM | WEIGHT: 236.31 LBS | SYSTOLIC BLOOD PRESSURE: 118 MMHG | HEIGHT: 66 IN

## 2020-10-01 DIAGNOSIS — E78.1 HYPERTRIGLYCERIDEMIA: Chronic | ICD-10-CM

## 2020-10-01 DIAGNOSIS — G89.29 OTHER CHRONIC PAIN: Primary | ICD-10-CM

## 2020-10-01 DIAGNOSIS — K76.0 FATTY LIVER: ICD-10-CM

## 2020-10-01 DIAGNOSIS — E11.42 TYPE 2 DIABETES MELLITUS WITH DIABETIC POLYNEUROPATHY, WITHOUT LONG-TERM CURRENT USE OF INSULIN: ICD-10-CM

## 2020-10-01 DIAGNOSIS — D72.829 LEUKOCYTOSIS, UNSPECIFIED TYPE: ICD-10-CM

## 2020-10-01 DIAGNOSIS — Z86.711 HISTORY OF PULMONARY EMBOLISM: ICD-10-CM

## 2020-10-01 DIAGNOSIS — I82.409 RECURRENT DEEP VEIN THROMBOSIS (DVT): ICD-10-CM

## 2020-10-01 DIAGNOSIS — J44.9 CHRONIC OBSTRUCTIVE PULMONARY DISEASE, UNSPECIFIED COPD TYPE: Chronic | ICD-10-CM

## 2020-10-01 DIAGNOSIS — F31.9 BIPOLAR 1 DISORDER: Chronic | ICD-10-CM

## 2020-10-01 DIAGNOSIS — E11.42 DIABETIC POLYNEUROPATHY ASSOCIATED WITH TYPE 2 DIABETES MELLITUS: ICD-10-CM

## 2020-10-01 DIAGNOSIS — G89.29 OTHER CHRONIC PAIN: ICD-10-CM

## 2020-10-01 DIAGNOSIS — I10 ESSENTIAL HYPERTENSION: Chronic | ICD-10-CM

## 2020-10-01 LAB
AT III ACT/NOR PPP CHRO: 115 % (ref 83–118)
C3 SERPL-MCNC: 140 MG/DL (ref 50–180)
C4 SERPL-MCNC: 23 MG/DL (ref 11–44)
CCP AB SER IA-ACNC: <0.5 U/ML
CRP SERPL-MCNC: 5.8 MG/L (ref 0–8.2)
DAT IGG-SP REAG RBC-IMP: NORMAL
ERYTHROCYTE [SEDIMENTATION RATE] IN BLOOD BY WESTERGREN METHOD: 22 MM/HR (ref 0–36)
FIBRINOGEN PPP-MCNC: 378 MG/DL (ref 182–366)
HCYS SERPL-SCNC: 6.7 UMOL/L (ref 4–15.5)
IGA SERPL-MCNC: 196 MG/DL (ref 40–350)
IGG SERPL-MCNC: 518 MG/DL (ref 650–1600)
IGM SERPL-MCNC: 27 MG/DL (ref 50–300)
RHEUMATOID FACT SERPL-ACNC: <10 IU/ML (ref 0–15)

## 2020-10-01 PROCEDURE — 85305 CLOT INHIBIT PROT S TOTAL: CPT

## 2020-10-01 PROCEDURE — 99999 PR PBB SHADOW E&M-EST. PATIENT-LVL V: ICD-10-PCS | Mod: PBBFAC,,, | Performed by: STUDENT IN AN ORGANIZED HEALTH CARE EDUCATION/TRAINING PROGRAM

## 2020-10-01 PROCEDURE — 86160 COMPLEMENT ANTIGEN: CPT

## 2020-10-01 PROCEDURE — 86704 HEP B CORE ANTIBODY TOTAL: CPT

## 2020-10-01 PROCEDURE — 99204 OFFICE O/P NEW MOD 45 MIN: CPT | Mod: S$PBB,,, | Performed by: STUDENT IN AN ORGANIZED HEALTH CARE EDUCATION/TRAINING PROGRAM

## 2020-10-01 PROCEDURE — 85652 RBC SED RATE AUTOMATED: CPT

## 2020-10-01 PROCEDURE — 82164 ANGIOTENSIN I ENZYME TEST: CPT

## 2020-10-01 PROCEDURE — 82652 VIT D 1 25-DIHYDROXY: CPT

## 2020-10-01 PROCEDURE — 84165 PATHOLOGIST INTERPRETATION SPE: ICD-10-PCS | Mod: 26,,, | Performed by: PATHOLOGY

## 2020-10-01 PROCEDURE — 86706 HEP B SURFACE ANTIBODY: CPT

## 2020-10-01 PROCEDURE — 82595 ASSAY OF CRYOGLOBULIN: CPT

## 2020-10-01 PROCEDURE — 86140 C-REACTIVE PROTEIN: CPT

## 2020-10-01 PROCEDURE — 99204 PR OFFICE/OUTPT VISIT, NEW, LEVL IV, 45-59 MIN: ICD-10-PCS | Mod: S$PBB,,, | Performed by: STUDENT IN AN ORGANIZED HEALTH CARE EDUCATION/TRAINING PROGRAM

## 2020-10-01 PROCEDURE — 86255 FLUORESCENT ANTIBODY SCREEN: CPT | Mod: 91

## 2020-10-01 PROCEDURE — 86334 IMMUNOFIX E-PHORESIS SERUM: CPT

## 2020-10-01 PROCEDURE — 83516 IMMUNOASSAY NONANTIBODY: CPT | Mod: 59

## 2020-10-01 PROCEDURE — 86235 NUCLEAR ANTIGEN ANTIBODY: CPT

## 2020-10-01 PROCEDURE — 86880 COOMBS TEST DIRECT: CPT

## 2020-10-01 PROCEDURE — 86334 PATHOLOGIST INTERPRETATION IFE: ICD-10-PCS | Mod: 26,,, | Performed by: PATHOLOGY

## 2020-10-01 PROCEDURE — 84165 PROTEIN E-PHORESIS SERUM: CPT | Mod: 26,,, | Performed by: PATHOLOGY

## 2020-10-01 PROCEDURE — 84165 PROTEIN E-PHORESIS SERUM: CPT

## 2020-10-01 PROCEDURE — 82787 IGG 1 2 3 OR 4 EACH: CPT

## 2020-10-01 PROCEDURE — 83520 IMMUNOASSAY QUANT NOS NONAB: CPT

## 2020-10-01 PROCEDURE — 99999 PR PBB SHADOW E&M-EST. PATIENT-LVL V: CPT | Mod: PBBFAC,,, | Performed by: STUDENT IN AN ORGANIZED HEALTH CARE EDUCATION/TRAINING PROGRAM

## 2020-10-01 PROCEDURE — 87340 HEPATITIS B SURFACE AG IA: CPT

## 2020-10-01 PROCEDURE — 86592 SYPHILIS TEST NON-TREP QUAL: CPT

## 2020-10-01 PROCEDURE — 85613 RUSSELL VIPER VENOM DILUTED: CPT

## 2020-10-01 PROCEDURE — 86200 CCP ANTIBODY: CPT

## 2020-10-01 PROCEDURE — 99215 OFFICE O/P EST HI 40 MIN: CPT | Mod: PBBFAC,25 | Performed by: STUDENT IN AN ORGANIZED HEALTH CARE EDUCATION/TRAINING PROGRAM

## 2020-10-01 PROCEDURE — 86803 HEPATITIS C AB TEST: CPT

## 2020-10-01 PROCEDURE — 86147 CARDIOLIPIN ANTIBODY EA IG: CPT | Mod: 59

## 2020-10-01 PROCEDURE — 86038 ANTINUCLEAR ANTIBODIES: CPT

## 2020-10-01 PROCEDURE — 82784 ASSAY IGA/IGD/IGG/IGM EACH: CPT | Mod: 59

## 2020-10-01 PROCEDURE — 83090 ASSAY OF HOMOCYSTEINE: CPT

## 2020-10-01 PROCEDURE — 86146 BETA-2 GLYCOPROTEIN ANTIBODY: CPT

## 2020-10-01 PROCEDURE — 86162 COMPLEMENT TOTAL (CH50): CPT

## 2020-10-01 PROCEDURE — 83516 IMMUNOASSAY NONANTIBODY: CPT

## 2020-10-01 PROCEDURE — 85303 CLOT INHIBIT PROT C ACTIVITY: CPT

## 2020-10-01 PROCEDURE — 85384 FIBRINOGEN ACTIVITY: CPT

## 2020-10-01 PROCEDURE — 86682 HELMINTH ANTIBODY: CPT

## 2020-10-01 PROCEDURE — 85300 ANTITHROMBIN III ACTIVITY: CPT

## 2020-10-01 PROCEDURE — 86790 VIRUS ANTIBODY NOS: CPT

## 2020-10-01 PROCEDURE — 86703 HIV-1/HIV-2 1 RESULT ANTBDY: CPT

## 2020-10-01 PROCEDURE — 86334 IMMUNOFIX E-PHORESIS SERUM: CPT | Mod: 26,,, | Performed by: PATHOLOGY

## 2020-10-01 PROCEDURE — 86431 RHEUMATOID FACTOR QUANT: CPT

## 2020-10-01 PROCEDURE — 86160 COMPLEMENT ANTIGEN: CPT | Mod: 59

## 2020-10-01 NOTE — PROGRESS NOTES
50 year old white female with     Htn,diabetes,COPD,Fatty liver    Recurrent DVTs and PE s s/p IVCFilter in 2013  PE 2011 left   B/l leg DVT 2013  2 DVTs left leg even after filter was placed    Used to be on coumadin not anymore     H/o splenectomy in 2003  Recurrent infections     Hematology 2019 : sep    Work up not done    She has a diagnosis of RA as per rheumatology in [place]  On NSAIDs only particularly celebrex     Now has   Pain in the shoulders,feet,back,hips,elbows and hands   Non inflammatory  No swelling except the left foot  No significant morning stiffness    No other symptoms     She now has leucocytosis,thrombocytosis   White count 13.85  plt count 490    2011 to now she has always leucocytosis and thrombocytosis     Neutrophil very high at times  Lymphocytes elevated  eos also sometimes close to 1 k/mcl    Renal function ok    LFTS usually ok    She has intermittent hematuria AND proteinuria     CTA chest abdomen and pelvis nml  No evidence of vasculitis      CT chest abdomen and pelvis nml    1. No acute abnormalities identified.  2. Cholecystectomy.  3. Hepatomegaly with suspected hepatic steatosis.    1. No evidence of aortic aneurysm or dissection.  2. No acute intrathoracic or intra-abdominal abnormalities identified.  3. Small 4 mm right lower lobe pulmonary nodule.  For a solid nodule <6 mm, Fleischner Society 2017 guidelines recommend no routine follow up for a low risk patient, or follow-up with non-contrast chest CT at 12 months in a high risk patient.  4. Postsurgical changes of cholecystectomy, splenectomy, and sleeve gastrectomy.      US abdomen nml      A.) Liver evaluation  How bad is the fatty liver ?  Did NAFLD lead to hypersplenism and did that need splenectomy ?    2.) autoimmune w.u  APLAS,SLE,RF,CCP,HLAB27,ANCA,MPO,PR3  UA    3.) HEMATOLOGY CONSULTATION  Hypercoagulable w.u

## 2020-10-01 NOTE — PROGRESS NOTES
"Subjective:       Patient ID: Audrey Natarajan is a 50 y.o. female.    Chief Complaint: New pt eval for diffuse body/joint pains    49 yo F w/PMH of HTN, DM2, COPD, fatty liver, recurrent DVT/PE s/p IVC filter in 2013, splenectomy in 2003 for unknown reasons, and "arthritis" unclear if RA or OA. Per pt diagnosed in [place] and was on Celebrex with relief. However, lost her insurance and unable to afford it.     Endorses pains of her whole back, shoulders up into neck, b/l feet L > R with some swelling of L foot at times, pain of hips, elbows, fingers. Weather changing makes it worse. Has mobic and ibuprofen prn but not much relief. Once in awhile takes hydrocodone, tramadol, Flexeril, or methocarbamol that she has left over after surgeries which helps.     Hem hx unclear: Saw Dr. Sagastume Nov 2019. Hx of left PE 2011. Hx of Bilateral DVT in 2013. IVC filter placed in 2013.  Hx of DVT Left Leg 11/2019. Per pt has had 2 DVTs since IVC placement. States that she has not been on Coumadin for yrs and PCP took her off. Hypercoagulable work up ordered but never collected. Also unclear, hx of etiology behind splenectomy in 2003. Persistent leukocytosis and thrombocytosis, per pt was told by hematology they will always be abnormal.    No skin rashes,malar rash,photosensitivity No telangiectasias No calcinosis No psoriasis No patchy alopecia No oral and nasal ulcers No dry eyes and dry mouth No pleurisy or any cardiopulmonary complaints No dysphagia,diplopia and dysphonia and muscle weakness No n/v/d/c No acid reflux+ No raynaud's+ No digital ulcers No cytopenias No renal issues No fever,chills,night sweats,weight loss and loss of appetite No pregnancy losses/pre term deliveries /pregnancy complications No new onset headaches No recurrent conjunctivitis or uveitis or scleritis or episcleritis No chronic or bloody diarrhea with no u colitis or crohn's /inflammatory bowel disease No vaginal or urethral  d/c/STDs/no ulcers "     FH: denies any AI hx  SH: TOB use 2 cigs/day is trying to cut back, no etoh or drug use per pt      Widespread pain index 18  Note the areas which the patient has had pain over the last week:                   Shoulder-girdle, left 1               Shoulder-girdle, right 1                         Upper arm left 1                       Upper arm right 1                         Lower arm left 1                       Lower arm right 1    Hip (buttock, trochanter) left 1  Hip (buttock, trochanter) right 1                           Upper leg, left 1                         Upper leg, right 1                            Lower leg, left 1                         Lower leg, right 1                                     Jaw, left 1                                   Jaw, right 1                                        Chest                                  Abdomen 1                               Upper back 1                              Lower back 1                                        Neck 1  Score will be from 0-19:                                         Symptom severity score 8  Fatigue 2  Waking Unrefreshed 3  Cognitive Symptoms 0   0 = no problem, 1=slight or mild problem 2= moderate; considerable problems often present and/or at a moderate level, 3 = severe, pervasive, continuous, life disturbing problem  For each of the 3 symptoms, indicate the level of severity over the past week using the Scale.  The symptom severity score is the sum of the severity of the 3 symptoms (fatigue, waking unrefreshed, and cognitive symptoms) plus the number of the following symptoms occurring during the previous 6 months:   Headaches 1  Pain or cramps in the lower abdomen 1  Depression 1  The final score is between 0 and 12                                          Criteria  Patient has fibromyalgia if the following 3 conditions are met:  1.  Widespread pain index greater than or equal to 7 and symptom severity score greater than or  "equal to 5 or widespread pain index between 3- 6, and symptom severity score greater than or equal to 9.    2.  Symptoms have been present in a similar level for at least 3 months  3.  The patient does not have a disorder that would otherwise sufficiently explain the pain        Review of Systems   Constitutional: Positive for fatigue. Negative for diaphoresis and fever.   HENT: Negative for mouth sores and trouble swallowing.    Eyes: Negative for photophobia, pain, redness and visual disturbance.   Respiratory: Negative for cough and shortness of breath.    Cardiovascular: Negative for chest pain and palpitations.   Gastrointestinal: Negative for abdominal pain, diarrhea and vomiting.   Genitourinary: Negative for dysuria and hematuria.   Musculoskeletal: Positive for arthralgias, back pain, myalgias and neck pain. Negative for gait problem, joint swelling and neck stiffness.   Skin: Negative for color change and rash.   Neurological: Positive for headaches. Negative for weakness.   Hematological: Negative for adenopathy. Does not bruise/bleed easily.   Psychiatric/Behavioral: Positive for sleep disturbance. Negative for agitation.         Objective:   /69   Pulse 71   Ht 5' 6" (1.676 m)   Wt 107.2 kg (236 lb 5.3 oz)   LMP 11/01/2011 (LMP Unknown) Comment: stated when she was 39  BMI 38.15 kg/m²      Physical Exam   Vitals reviewed.  Constitutional: She is oriented to person, place, and time and well-developed, well-nourished, and in no distress. No distress.   BMI 38 obese   HENT:   Head: Normocephalic and atraumatic.   Right Ear: External ear normal.   Left Ear: External ear normal.   Nose: Nose normal.   Mouth/Throat: Oropharynx is clear and moist. No oropharyngeal exudate.   Eyes: Conjunctivae and EOM are normal. Pupils are equal, round, and reactive to light. Right eye exhibits no discharge. Left eye exhibits no discharge. No scleral icterus.   Neck: Neck supple. No thyromegaly present. "   Cardiovascular: Normal rate, regular rhythm, normal heart sounds and intact distal pulses.    No murmur heard.  Pulmonary/Chest: Effort normal and breath sounds normal. No respiratory distress. She has no wheezes. She has no rales. She exhibits no tenderness.   Abdominal: Soft. Bowel sounds are normal. She exhibits no distension. There is no abdominal tenderness. There is no guarding.   Neurological: She is alert and oriented to person, place, and time.   Skin: Skin is warm and dry. No rash noted. She is not diaphoretic. No erythema.     Psychiatric: Mood and affect normal.   Musculoskeletal: Normal range of motion. Tenderness present. No deformity or edema.      Comments: No synovitis or dactylitis  FROM UE and LE b/l  5/5 mm strength UE and LE b/l         CTA chest abdomen and pelvis nml  No evidence of vasculitis     CT chest abdomen and pelvis nml  1. No acute abnormalities identified.  2. Cholecystectomy.  3. Hepatomegaly with suspected hepatic steatosis.     1. No evidence of aortic aneurysm or dissection.  2. No acute intrathoracic or intra-abdominal abnormalities identified.  3. Small 4 mm right lower lobe pulmonary nodule.  For a solid nodule <6 mm, Fleischner Society 2017 guidelines recommend no routine follow up for a low risk patient, or follow-up with non-contrast chest CT at 12 months in a high risk patient.  4. Postsurgical changes of cholecystectomy, splenectomy, and sleeve gastrectomy.     US abdomen nml    Assessment:       1. Other chronic pain    2. Leukocytosis, unspecified type    3. Recurrent deep vein thrombosis (DVT)    4. Fatty liver    5. Essential hypertension    6. Hypertriglyceridemia    7. Chronic obstructive pulmonary disease, unspecified COPD type    8. Type 2 diabetes mellitus with diabetic polyneuropathy, without long-term current use of insulin    9. History of pulmonary embolism    10. Diabetic polyneuropathy associated with type 2 diabetes mellitus    11. Bipolar 1 disorder             Plan:       Problem List Items Addressed This Visit        Neuro    Diabetic neuropathy    Other chronic pain - Primary    Relevant Orders    Sedimentation rate (Completed)    C-Reactive Protein (Completed)    Rheumatoid factor (Completed)    CYCLIC CITRUL PEPTIDE ANTIBODY, IGG (Completed)    CAIN    Sjogrens syndrome-A extractable nuclear antibody    C3 COMPLEMENT (Completed)    C4 COMPLEMENT (Completed)    Complement, total    Protein / creatinine ratio, urine    Urinalysis (Completed)    Direct antiglobulin test (Completed)    LUPUS ANTICOAGULANT (DRVVT)    Beta-2 Glycoprotein Abs (IgA, IgG, IgM)    Cardiolipin antibody    ANTI-NEUTROPHILIC CYTOPLASMIC ANTIBODY    Myeloperoxidase Antibody (MPO)    Proteinase 3 Autoantibodies    IMMUNOGLOBULINS (IGG, IGA, IGM) QUANTITATIVE (Completed)    IgG 1, 2, 3, and 4    PROTEIN ELECTROPHORESIS, SERUM (Completed)    Immunofixation Electrophoresis    Angiotensin Converting Enzyme    CALCITRIOL    INTERLEUKIN-2 RECEPTOR    CRYOGLOBULIN    HIV 1/2 Ag/Ab (4th Gen)    Hepatitis B surface antigen    HBcAB    Hepatitis B surface antibody    Hepatitis C antibody    Hepatitis A antibody, IgG    Strongyloides IgG Antibodies    Quantiferon Gold TB    RPR    HLA B27 Antigen       Psychiatric    Bipolar 1 disorder (Chronic)       Pulmonary    COPD (chronic obstructive pulmonary disease) (Chronic)       Cardiac/Vascular    Essential hypertension (Chronic)    Hypertriglyceridemia (Chronic)       Hematology    History of pulmonary embolism       Oncology    Leukocytosis    Relevant Orders    Ambulatory referral/consult to Hematology / Oncology       Endocrine    Type 2 diabetes mellitus      Other Visit Diagnoses     Recurrent deep vein thrombosis (DVT)        Relevant Orders    PROTHROMBIN GENE MUTATION    FIBRINOGEN (Completed)    ANTITHROMBIN III (Completed)    HOMOCYSTEINE, SERUM (Completed)    PROTEIN S FUNCTIONAL ACTIVITY    PROTEIN C FUNCTIONAL ACTIVITY    Fatty liver         "Relevant Orders    Ambulatory referral/consult to Hepatology        51 yo F w/PMH of HTN, DM2, COPD, fatty liver, recurrent DVT/PE s/p IVC filter in 2013, splenectomy in 2003 for unknown reasons, and "arthritis" unclear if RA or OA. Per pt diagnosed in [place] and was on Celebrex with relief. However, lost her insurance and unable to afford it.     Endorses pains of her whole back, shoulders up into neck, b/l feet L > R with some swelling of L foot at times, pain of hips, elbows, fingers. Weather changing makes it worse. Has mobic and ibuprofen prn but not much relief. Once in awhile takes hydrocodone, tramadol, Flexeril, or methocarbamol that she has left over after surgeries which helps.     Hem hx unclear: Saw Dr. Sagastume Nov 2019. Hx of left PE 2011. Hx of Bilateral DVT in 2013. IVC filter placed in 2013.  Hx of DVT Left Leg 11/2019. Per pt has had 2 DVTs since IVC placement. States that she has not been on Coumadin for yrs and PCP took her off. Hypercoagulable work up ordered but never collected. Also unclear, hx of etiology behind splenectomy in 2003. Persistent leukocytosis and thrombocytosis, per pt was told by hematology they will always be abnormal.    Chronic pain  - Sedimentation rate; Future  - C-Reactive Protein; Future  - Rheumatoid factor; Future  - CYCLIC CITRUL PEPTIDE ANTIBODY, IGG; Future  - CAIN; Future  - Sjogrens syndrome-A extractable nuclear antibody; Future  - C3 COMPLEMENT; Future  - C4 COMPLEMENT; Future  - Complement, total; Future  - Protein / creatinine ratio, urine; Future  - Urinalysis; Future  - Direct antiglobulin test; Future  - LUPUS ANTICOAGULANT (DRVVT); Future  - Beta-2 Glycoprotein Abs (IgA, IgG, IgM); Future  - Cardiolipin antibody; Future  - ANTI-NEUTROPHILIC CYTOPLASMIC ANTIBODY; Future  - Myeloperoxidase Antibody (MPO); Future  - Proteinase 3 Autoantibodies; Future  - IMMUNOGLOBULINS (IGG, IGA, IGM) QUANTITATIVE; Future  - IgG 1, 2, 3, and 4; Future  - PROTEIN " ELECTROPHORESIS, SERUM; Future  - Immunofixation Electrophoresis; Future  - Angiotensin Converting Enzyme; Future  - CALCITRIOL; Future  - INTERLEUKIN-2 RECEPTOR; Future  - CRYOGLOBULIN; Future  - HIV 1/2 Ag/Ab (4th Gen); Future  - Hepatitis B surface antigen; Future  - HBcAB; Future  - Hepatitis B surface antibody; Future  - Hepatitis C antibody; Future  - Hepatitis A antibody, IgG; Future  - Strongyloides IgG Antibodies; Future  - Quantiferon Gold TB; Future  - RPR; Future    Leukocytosis, unspecified type  Recurrent deep vein thrombosis (DVT)  - Ambulatory referral/consult to Hematology / Oncology; Future  - PROTHROMBIN GENE MUTATION; Future  - FIBRINOGEN; Future  - ANTITHROMBIN III; Future  - HOMOCYSTEINE, SERUM; Future  - PROTEIN S FUNCTIONAL ACTIVITY; Future  - PROTEIN C FUNCTIONAL ACTIVITY; Future  - Ambulatory referral/consult to Hepatology; Future      Plan:  - Does meet criteria for fibromyalgia but will investigate further with work up above. Already on gabapentin for DM neuropathy. Tx is aerobic exercise/yoga/juan carlos chi. Will defer to PCP or pysc if Cymbalta considered as pt has hx of Bipolar disease.  - Labs/urine above for complete rheumatologic work up. Will include hematology hypercoagulable work up that was ordered by Dr. Sagastume.  - Amb referral to Hematology for recurrent DVT/PE and hypercoagulable work up with recurrent leukocytosis/thrombocytosis  - Amb referral to Hepatology for eval for fatty liver. Did NAFLD lead to hypersplenism and did that need splenectomy?    Discussed with Dr. Askew. RTC 6-8 wks.    Breanne Carrion,   Rheumatology PGY5

## 2020-10-02 DIAGNOSIS — I10 ESSENTIAL HYPERTENSION: Chronic | ICD-10-CM

## 2020-10-02 DIAGNOSIS — M54.50 CHRONIC BILATERAL LOW BACK PAIN WITHOUT SCIATICA: ICD-10-CM

## 2020-10-02 DIAGNOSIS — G89.29 CHRONIC BILATERAL LOW BACK PAIN WITHOUT SCIATICA: ICD-10-CM

## 2020-10-02 LAB
ACE SERPL-CCNC: 14 U/L (ref 16–85)
ALBUMIN SERPL ELPH-MCNC: 3.76 G/DL (ref 3.35–5.55)
ALPHA1 GLOB SERPL ELPH-MCNC: 0.47 G/DL (ref 0.17–0.41)
ALPHA2 GLOB SERPL ELPH-MCNC: 0.86 G/DL (ref 0.43–0.99)
ANA SER QL IF: NORMAL
ANTI-SSA ANTIBODY: 0.05 RATIO (ref 0–0.99)
ANTI-SSA INTERPRETATION: NEGATIVE
APTT PROTEIN C ACTIVATOR+FV DP/APTT PPP: 156 % (ref 70–140)
B-GLOBULIN SERPL ELPH-MCNC: 0.84 G/DL (ref 0.5–1.1)
BACTERIA BLD CULT: ABNORMAL
GAMMA GLOB SERPL ELPH-MCNC: 0.57 G/DL (ref 0.67–1.58)
HBV CORE AB SERPL QL IA: NEGATIVE
HBV SURFACE AB SER-ACNC: NEGATIVE M[IU]/ML
HBV SURFACE AG SERPL QL IA: NEGATIVE
HCV AB SERPL QL IA: NEGATIVE
HEPATITIS A ANTIBODY, IGG: NEGATIVE
HIV 1+2 AB+HIV1 P24 AG SERPL QL IA: NEGATIVE
INTERPRETATION SERPL IFE-IMP: NORMAL
PATHOLOGIST INTERPRETATION IFE: NORMAL
PATHOLOGIST INTERPRETATION SPE: NORMAL
PROT S ACT/NOR PPP: 97 % (ref 70–140)
PROT SERPL-MCNC: 6.5 G/DL (ref 6–8.4)
RPR SER QL: NORMAL

## 2020-10-02 RX ORDER — METOPROLOL TARTRATE 50 MG/1
50 TABLET ORAL 2 TIMES DAILY
Qty: 60 TABLET | Refills: 2 | Status: ON HOLD | OUTPATIENT
Start: 2020-10-02 | End: 2021-02-20 | Stop reason: SDUPTHER

## 2020-10-02 RX ORDER — MELOXICAM 15 MG/1
15 TABLET ORAL DAILY
Qty: 30 TABLET | Refills: 2 | Status: ON HOLD | OUTPATIENT
Start: 2020-10-02 | End: 2021-02-20 | Stop reason: SDUPTHER

## 2020-10-02 NOTE — PROGRESS NOTES
I have reviewed the notes,documentation by  and I agree with her recommendations and I have made an addendum to her note    Also noted lot of old imaging studies with different findings,Once we have the autoimmune labs back we will re assess the situation

## 2020-10-03 LAB
BACTERIA BLD CULT: NORMAL
PROTEINASE3 IGG SER-ACNC: <0.2 U
SOL IL2 RECEP SERPL-MCNC: 520.5 PG/ML (ref 175.3–858.2)

## 2020-10-05 LAB
1,25(OH)2D3 SERPL-MCNC: 28 PG/ML (ref 20–79)
ANCA AB TITR SER IF: NORMAL TITER
CARDIOLIPIN IGG SER IA-ACNC: <9.4 GPL (ref 0–14.99)
CARDIOLIPIN IGM SER IA-ACNC: <9.4 MPL (ref 0–12.49)
CH50 SERPL-ACNC: 84 U/ML (ref 42–95)
IGG1 SER-MCNC: 245 MG/DL (ref 382–929)
IGG2 SER-MCNC: 213 MG/DL (ref 242–700)
IGG3 SER-MCNC: 78 MG/DL (ref 22–176)
IGG4 SER-MCNC: 7 MG/DL (ref 4–86)
MYELOPEROXIDASE AB SER-ACNC: 2 UNITS
P-ANCA TITR SER IF: NORMAL TITER
STRONGYLOIDES ANTIBODY IGG: NEGATIVE

## 2020-10-06 LAB
B2 GLYCOPROT1 IGA SER QL: 15 SAU
B2 GLYCOPROT1 IGG SER QL: <9 SGU
B2 GLYCOPROT1 IGM SER QL: <9 SMU
LA PPP-IMP: NEGATIVE

## 2020-10-12 LAB — CRYOGLOB SER QL: NORMAL

## 2020-10-15 ENCOUNTER — OFFICE VISIT (OUTPATIENT)
Dept: SURGERY | Facility: CLINIC | Age: 50
End: 2020-10-15
Payer: MEDICAID

## 2020-10-15 VITALS
BODY MASS INDEX: 37.93 KG/M2 | WEIGHT: 236 LBS | HEART RATE: 109 BPM | DIASTOLIC BLOOD PRESSURE: 71 MMHG | HEIGHT: 66 IN | SYSTOLIC BLOOD PRESSURE: 144 MMHG

## 2020-10-15 DIAGNOSIS — K80.80 BILIARY CALCULUS OF OTHER SITE WITHOUT OBSTRUCTION: Primary | ICD-10-CM

## 2020-10-15 PROCEDURE — 99024 PR POST-OP FOLLOW-UP VISIT: ICD-10-PCS | Mod: S$GLB,,, | Performed by: SURGERY

## 2020-10-15 PROCEDURE — 99024 POSTOP FOLLOW-UP VISIT: CPT | Mod: S$GLB,,, | Performed by: SURGERY

## 2020-10-15 NOTE — PROGRESS NOTES
Subjective:       Patient ID: Audrey Natarajan is a 50 y.o. female.    Chief Complaint: Post-op Evaluation    HPI 50 y female s/p lap misael without complaints  Review of Systems   Constitutional: Negative.    HENT: Negative.    Eyes: Negative.    Respiratory: Negative.    Cardiovascular: Negative.    Gastrointestinal: Negative.    Endocrine: Negative.    Musculoskeletal: Negative.    Integumentary:  Negative.   Allergic/Immunologic: Negative.    Neurological: Negative.    Hematological: Negative.    Psychiatric/Behavioral: Negative.    All other systems reviewed and are negative.        Objective:      Physical Exam  Vitals signs reviewed.   Constitutional:       Appearance: She is well-developed.   HENT:      Head: Normocephalic and atraumatic.      Right Ear: External ear normal.      Left Ear: External ear normal.      Nose: Nose normal.   Eyes:      Conjunctiva/sclera: Conjunctivae normal.      Pupils: Pupils are equal, round, and reactive to light.   Neck:      Musculoskeletal: Normal range of motion and neck supple.   Cardiovascular:      Rate and Rhythm: Normal rate and regular rhythm.      Heart sounds: Normal heart sounds.   Pulmonary:      Effort: Pulmonary effort is normal.      Breath sounds: Normal breath sounds.   Abdominal:      General: Bowel sounds are normal.      Palpations: Abdomen is soft.   Musculoskeletal: Normal range of motion.   Skin:     General: Skin is warm and dry.   Neurological:      Mental Status: She is alert and oriented to person, place, and time.      Deep Tendon Reflexes: Reflexes are normal and symmetric.   Psychiatric:         Behavior: Behavior normal.         Thought Content: Thought content normal.         Assessment:       1. Biliary calculus of other site without obstruction      doing well post  Plan:       She will come back to see me for hernia repair

## 2020-10-20 ENCOUNTER — TELEPHONE (OUTPATIENT)
Dept: FAMILY MEDICINE | Facility: CLINIC | Age: 50
End: 2020-10-20

## 2020-10-20 ENCOUNTER — TELEPHONE (OUTPATIENT)
Dept: PODIATRY | Facility: CLINIC | Age: 50
End: 2020-10-20

## 2020-10-20 DIAGNOSIS — M20.41 HAMMER TOES OF BOTH FEET: ICD-10-CM

## 2020-10-20 DIAGNOSIS — M76.72 PERONEUS BREVIS TENDONITIS, LEFT: ICD-10-CM

## 2020-10-20 DIAGNOSIS — M20.5X2 HALLUX LIMITUS, ACQUIRED, LEFT: ICD-10-CM

## 2020-10-20 DIAGNOSIS — M20.12 HALLUX ABDUCTO VALGUS, LEFT: ICD-10-CM

## 2020-10-20 DIAGNOSIS — M20.5X1 HALLUX LIMITUS, ACQUIRED, RIGHT: ICD-10-CM

## 2020-10-20 DIAGNOSIS — E11.49 TYPE II DIABETES MELLITUS WITH NEUROLOGICAL MANIFESTATIONS: Primary | ICD-10-CM

## 2020-10-20 DIAGNOSIS — M54.2 NECK PAIN ON LEFT SIDE: Primary | ICD-10-CM

## 2020-10-20 DIAGNOSIS — S86.892D LEFT MEDIAL TIBIAL STRESS SYNDROME, SUBSEQUENT ENCOUNTER: ICD-10-CM

## 2020-10-20 DIAGNOSIS — M20.42 HAMMER TOES OF BOTH FEET: ICD-10-CM

## 2020-10-20 RX ORDER — METHOCARBAMOL 500 MG/1
500 TABLET, FILM COATED ORAL 4 TIMES DAILY PRN
Qty: 120 TABLET | Refills: 0 | Status: SHIPPED | OUTPATIENT
Start: 2020-10-20 | End: 2020-11-19

## 2020-10-20 NOTE — TELEPHONE ENCOUNTER
----- Message from Maira De Los Santos DPM sent at 10/20/2020 10:48 AM CDT -----  Regarding: RE: Orders  Contact: Audrey Osman rx on file ans available for pickup  ----- Message -----  From: Lesa Niño RN  Sent: 10/20/2020   9:51 AM CDT  To: Maira De Los Santos DPM  Subject: FW: Orders                                         ----- Message -----  From: Wanda Mast  Sent: 10/20/2020   9:45 AM CDT  To: Magali Rao Staff  Subject: Orders                                           Type: Patient Call Back    Who called: Audrey    What is the request in detail:Patient called to request orders for new diabetic shoes. Please call to discuss    Can the clinic reply by [facility]?    Would the patient rather a call back or a response via [facility]? Call    Best call back number:[phone]

## 2020-10-22 DIAGNOSIS — D80.1 HYPOGAMMAGLOBULINEMIA: Primary | ICD-10-CM

## 2020-10-26 ENCOUNTER — OFFICE VISIT (OUTPATIENT)
Dept: FAMILY MEDICINE | Facility: CLINIC | Age: 50
End: 2020-10-26
Payer: MEDICAID

## 2020-10-26 ENCOUNTER — CLINICAL SUPPORT (OUTPATIENT)
Dept: SMOKING CESSATION | Facility: CLINIC | Age: 50
End: 2020-10-26
Payer: COMMERCIAL

## 2020-10-26 VITALS
HEIGHT: 66 IN | BODY MASS INDEX: 37.45 KG/M2 | RESPIRATION RATE: 18 BRPM | OXYGEN SATURATION: 96 % | SYSTOLIC BLOOD PRESSURE: 132 MMHG | DIASTOLIC BLOOD PRESSURE: 74 MMHG | HEART RATE: 88 BPM | WEIGHT: 233 LBS | TEMPERATURE: 98 F

## 2020-10-26 DIAGNOSIS — Z86.718 HISTORY OF DVT (DEEP VEIN THROMBOSIS): Chronic | ICD-10-CM

## 2020-10-26 DIAGNOSIS — I10 ESSENTIAL HYPERTENSION: Primary | Chronic | ICD-10-CM

## 2020-10-26 DIAGNOSIS — E11.42 TYPE 2 DIABETES MELLITUS WITH DIABETIC POLYNEUROPATHY, WITHOUT LONG-TERM CURRENT USE OF INSULIN: ICD-10-CM

## 2020-10-26 DIAGNOSIS — F17.200 NICOTINE DEPENDENCE: Primary | ICD-10-CM

## 2020-10-26 PROCEDURE — 99214 PR OFFICE/OUTPT VISIT, EST, LEVL IV, 30-39 MIN: ICD-10-PCS | Mod: S$PBB,,, | Performed by: INTERNAL MEDICINE

## 2020-10-26 PROCEDURE — 99214 OFFICE O/P EST MOD 30 MIN: CPT | Mod: S$PBB,,, | Performed by: INTERNAL MEDICINE

## 2020-10-26 PROCEDURE — 99404 PREV MED CNSL INDIV APPRX 60: CPT | Mod: S$GLB,,,

## 2020-10-26 PROCEDURE — 99215 OFFICE O/P EST HI 40 MIN: CPT | Mod: PBBFAC,PN | Performed by: INTERNAL MEDICINE

## 2020-10-26 PROCEDURE — 99999 PR PBB SHADOW E&M-EST. PATIENT-LVL I: ICD-10-PCS | Mod: PBBFAC,,,

## 2020-10-26 PROCEDURE — 99404 PR PREVENT COUNSEL,INDIV,60 MIN: ICD-10-PCS | Mod: S$GLB,,,

## 2020-10-26 PROCEDURE — 99999 PR PBB SHADOW E&M-EST. PATIENT-LVL V: ICD-10-PCS | Mod: PBBFAC,,, | Performed by: INTERNAL MEDICINE

## 2020-10-26 PROCEDURE — 99999 PR PBB SHADOW E&M-EST. PATIENT-LVL I: CPT | Mod: PBBFAC,,,

## 2020-10-26 PROCEDURE — 99999 PR PBB SHADOW E&M-EST. PATIENT-LVL V: CPT | Mod: PBBFAC,,, | Performed by: INTERNAL MEDICINE

## 2020-10-26 RX ORDER — DM/P-EPHED/ACETAMINOPH/DOXYLAM 30-7.5/3
LIQUID (ML) ORAL
Qty: 144 LOZENGE | Refills: 0 | Status: ON HOLD | OUTPATIENT
Start: 2020-10-26 | End: 2021-02-20 | Stop reason: HOSPADM

## 2020-10-26 RX ORDER — IBUPROFEN 200 MG
1 TABLET ORAL DAILY
Qty: 28 PATCH | Refills: 0 | Status: SHIPPED | OUTPATIENT
Start: 2020-10-26 | End: 2020-11-30 | Stop reason: SDUPTHER

## 2020-10-26 NOTE — PROGRESS NOTES
"Subjective:       Patient ID: Audrey Natarajan is a 50 y.o. female.    Chief Complaint: Sinus Problem and Follow-up    F/u back pain    HPI: 51 y/o w/ HTN DM s/p gastric bypass repeated VTE on NOAC presents for follow up. Since having gallbladder removed one month ago left posterior lumbar pain has improved movning bowels without straining no melena or BRBPR . Chronic L>>R lower extremity edema using compression stockings without significant improvement     Review of Systems   Constitutional: Negative for activity change, appetite change, fatigue, fever and unexpected weight change.   HENT: Negative for ear pain, rhinorrhea and sore throat.    Eyes: Negative for discharge and visual disturbance.   Respiratory: Negative for chest tightness, shortness of breath and wheezing.    Cardiovascular: Positive for leg swelling. Negative for chest pain and palpitations.   Gastrointestinal: Negative for abdominal pain, constipation and diarrhea.   Endocrine: Negative for cold intolerance and heat intolerance.   Genitourinary: Negative for dysuria and hematuria.   Musculoskeletal: Positive for back pain and joint swelling. Negative for neck stiffness.   Skin: Negative for rash.   Neurological: Negative for dizziness, syncope, weakness and headaches.   Psychiatric/Behavioral: Negative for suicidal ideas.       Objective:     Vitals:    10/26/20 1404 10/26/20 1428   BP: (!) 143/81 132/74   BP Location: Right arm    Patient Position: Sitting    BP Method: Medium (Automatic)    Pulse: 98 88   Resp: 18    Temp: 98.1 °F (36.7 °C)    TempSrc: Oral    SpO2: 96%    Weight: 105.7 kg (233 lb 0.4 oz)    Height: 5' 6" (1.676 m)           Physical Exam  Constitutional:       Appearance: She is well-developed.   HENT:      Head: Normocephalic and atraumatic.   Eyes:      General: No scleral icterus.     Conjunctiva/sclera: Conjunctivae normal.   Neck:      Musculoskeletal: Normal range of motion.   Cardiovascular:      Rate and Rhythm: " Normal rate and regular rhythm.      Heart sounds: No murmur. No friction rub. No gallop.    Pulmonary:      Effort: Pulmonary effort is normal.      Breath sounds: Normal breath sounds. No wheezing or rales.   Abdominal:      Palpations: Abdomen is soft.      Tenderness: There is no abdominal tenderness. There is no right CVA tenderness, left CVA tenderness, guarding or rebound.   Musculoskeletal: Normal range of motion.         General: No tenderness.   Skin:     General: Skin is warm and dry.   Neurological:      Mental Status: She is alert and oriented to person, place, and time.      Cranial Nerves: No cranial nerve deficit.   Psychiatric:         Mood and Affect: Mood normal.         Behavior: Behavior normal.         Thought Content: Thought content normal.         Assessment and Plan   1. Essential hypertension  BP just at goal continue current medications    2. History of DVT (deep vein thrombosis)  On NOAC to see hematology next week    3. Type 2 diabetes mellitus with diabetic polyneuropathy, without long-term current use of insulin  a1c at goal continue metformin will repeat prior to follow upin three months

## 2020-10-26 NOTE — PROGRESS NOTES
Patient will be participating in biweekly tobacco cessation meetings and will begin the prescribed tobacco cessation medication regimen of  21 mg nicotine patch QD .  She currently smokes 10-12 cigarettes per day.  Pt started on rate reduction and wait time of 15 min prior to smoking. Pt's exhaled carbon monoxide level was 36  ppm as per [employer]. (non- smoker = 0-5 ppm.) Will see pt back in office in 2 weeks.

## 2020-10-28 DIAGNOSIS — Z86.711 HISTORY OF PULMONARY EMBOLISM: ICD-10-CM

## 2020-10-28 DIAGNOSIS — D68.59 HYPERCOAGULABLE STATE: Primary | ICD-10-CM

## 2020-10-29 DIAGNOSIS — D80.1 HYPOGAMMAGLOBULINEMIA: Primary | ICD-10-CM

## 2020-11-04 ENCOUNTER — TELEPHONE (OUTPATIENT)
Dept: HEMATOLOGY/ONCOLOGY | Facility: CLINIC | Age: 50
End: 2020-11-04

## 2020-11-04 ENCOUNTER — CLINICAL SUPPORT (OUTPATIENT)
Dept: SMOKING CESSATION | Facility: CLINIC | Age: 50
End: 2020-11-04
Payer: COMMERCIAL

## 2020-11-04 DIAGNOSIS — F17.200 NICOTINE DEPENDENCE: Primary | ICD-10-CM

## 2020-11-04 PROCEDURE — 99999 PR PBB SHADOW E&M-EST. PATIENT-LVL I: CPT | Mod: PBBFAC,,,

## 2020-11-04 PROCEDURE — 99999 PR PBB SHADOW E&M-EST. PATIENT-LVL I: ICD-10-PCS | Mod: PBBFAC,,,

## 2020-11-04 PROCEDURE — 99402 PR PREVENT COUNSEL,INDIV,30 MIN: ICD-10-PCS | Mod: S$GLB,,,

## 2020-11-04 PROCEDURE — 99402 PREV MED CNSL INDIV APPRX 30: CPT | Mod: S$GLB,,,
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
## 2020-11-05 ENCOUNTER — LAB VISIT (OUTPATIENT)
Dept: LAB | Facility: HOSPITAL | Age: 50
End: 2020-11-05
Payer: MEDICAID

## 2020-11-05 ENCOUNTER — OFFICE VISIT (OUTPATIENT)
Dept: HEMATOLOGY/ONCOLOGY | Facility: CLINIC | Age: 50
End: 2020-11-05
Payer: MEDICAID

## 2020-11-05 VITALS
WEIGHT: 232.81 LBS | BODY MASS INDEX: 38.79 KG/M2 | HEIGHT: 65 IN | HEART RATE: 93 BPM | SYSTOLIC BLOOD PRESSURE: 135 MMHG | OXYGEN SATURATION: 97 % | RESPIRATION RATE: 18 BRPM | TEMPERATURE: 98 F | DIASTOLIC BLOOD PRESSURE: 68 MMHG

## 2020-11-05 DIAGNOSIS — D68.59 HYPERCOAGULABLE STATE: ICD-10-CM

## 2020-11-05 DIAGNOSIS — D75.839 THROMBOCYTOSIS: ICD-10-CM

## 2020-11-05 DIAGNOSIS — Z86.711 HISTORY OF PULMONARY EMBOLUS (PE): Chronic | ICD-10-CM

## 2020-11-05 DIAGNOSIS — Z86.718 HISTORY OF DVT (DEEP VEIN THROMBOSIS): Chronic | ICD-10-CM

## 2020-11-05 DIAGNOSIS — D72.829 LEUKOCYTOSIS, UNSPECIFIED TYPE: ICD-10-CM

## 2020-11-05 DIAGNOSIS — D72.829 LEUKOCYTOSIS, UNSPECIFIED TYPE: Primary | ICD-10-CM

## 2020-11-05 LAB
ANISOCYTOSIS BLD QL SMEAR: SLIGHT
BASOPHILS # BLD AUTO: 0.12 K/UL (ref 0–0.2)
BASOPHILS NFR BLD: 0.8 % (ref 0–1.9)
DIFFERENTIAL METHOD: ABNORMAL
EOSINOPHIL # BLD AUTO: 0.5 K/UL (ref 0–0.5)
EOSINOPHIL NFR BLD: 3.4 % (ref 0–8)
ERYTHROCYTE [DISTWIDTH] IN BLOOD BY AUTOMATED COUNT: 14.4 % (ref 11.5–14.5)
HCT VFR BLD AUTO: 37.8 % (ref 37–48.5)
HGB BLD-MCNC: 11.8 G/DL (ref 12–16)
HYPOCHROMIA BLD QL SMEAR: ABNORMAL
IMM GRANULOCYTES # BLD AUTO: 0.08 K/UL (ref 0–0.04)
IMM GRANULOCYTES NFR BLD AUTO: 0.5 % (ref 0–0.5)
LYMPHOCYTES # BLD AUTO: 6.7 K/UL (ref 1–4.8)
LYMPHOCYTES NFR BLD: 45.2 % (ref 18–48)
MCH RBC QN AUTO: 28.4 PG (ref 27–31)
MCHC RBC AUTO-ENTMCNC: 31.2 G/DL (ref 32–36)
MCV RBC AUTO: 91 FL (ref 82–98)
MONOCYTES # BLD AUTO: 1.3 K/UL (ref 0.3–1)
MONOCYTES NFR BLD: 8.8 % (ref 4–15)
NEUTROPHILS # BLD AUTO: 6.1 K/UL (ref 1.8–7.7)
NEUTROPHILS NFR BLD: 41.3 % (ref 38–73)
NRBC BLD-RTO: 0 /100 WBC
OVALOCYTES BLD QL SMEAR: ABNORMAL
PATH REV BLD -IMP: NORMAL
PATH REV BLD -IMP: NORMAL
PLATELET # BLD AUTO: 442 K/UL (ref 150–350)
PMV BLD AUTO: 9.8 FL (ref 9.2–12.9)
POIKILOCYTOSIS BLD QL SMEAR: SLIGHT
POLYCHROMASIA BLD QL SMEAR: ABNORMAL
RBC # BLD AUTO: 4.16 M/UL (ref 4–5.4)
WBC # BLD AUTO: 14.71 K/UL (ref 3.9–12.7)

## 2020-11-05 PROCEDURE — 99214 OFFICE O/P EST MOD 30 MIN: CPT | Mod: S$PBB,,, | Performed by: INTERNAL MEDICINE

## 2020-11-05 PROCEDURE — 99214 PR OFFICE/OUTPT VISIT, EST, LEVL IV, 30-39 MIN: ICD-10-PCS | Mod: S$PBB,,, | Performed by: INTERNAL MEDICINE

## 2020-11-05 PROCEDURE — 99999 PR PBB SHADOW E&M-EST. PATIENT-LVL V: ICD-10-PCS | Mod: PBBFAC,,, | Performed by: INTERNAL MEDICINE

## 2020-11-05 PROCEDURE — 85060 BLOOD SMEAR INTERPRETATION: CPT | Mod: ,,, | Performed by: PATHOLOGY

## 2020-11-05 PROCEDURE — 85060 PATHOLOGIST REVIEW: ICD-10-PCS | Mod: ,,, | Performed by: PATHOLOGY

## 2020-11-05 PROCEDURE — 36415 COLL VENOUS BLD VENIPUNCTURE: CPT

## 2020-11-05 PROCEDURE — 85025 COMPLETE CBC W/AUTO DIFF WBC: CPT

## 2020-11-05 PROCEDURE — 99215 OFFICE O/P EST HI 40 MIN: CPT | Mod: PBBFAC | Performed by: INTERNAL MEDICINE

## 2020-11-05 PROCEDURE — 99999 PR PBB SHADOW E&M-EST. PATIENT-LVL V: CPT | Mod: PBBFAC,,, | Performed by: INTERNAL MEDICINE

## 2020-11-05 NOTE — PROGRESS NOTES
"PATIENT: Audrey Natarajan  MRN: [id number]  DATE: 11/10/2020      Diagnosis:   1. History of DVT (deep vein thrombosis)    2. Leukocytosis, unspecified type        Chief Complaint: Evaluation of persistent leukocytosis    Subjective:    Initial History: Ms. Natarajan is a 50 y.o. female with PMHx HTN, COPD, DM s/p gastric bypass in 2016, recurrent VTEs with IVC filter, PAYNE with hepatomegaly, bipolar disorder, rosacea, tobacco use, cholecystectomy, splenectomy,and oophorectomy referred for evaluation of leukocytosis. Pt has had a persistent leukocytosis since the first values recorded in our system in 2011. As well has had a thrombocytosis of 300-550 over the same time period. States she has known about her leukocytosis for a long time. Was having abdominal pain which was finally diagnosed as "something wrong with my spleen" and underwent a splenectomy in 2003 with resolution of symptoms. Was told from that point on she would have an elevated white count. She has also underwent two bone marrow biopsies previously for this workup, one in 2003 and one in 2009, both reportedly normal. She denies any complaints today other than her typical allergies. Denies fevers, chills, night sweats, headache, vision changes. Does have a chronic cough.    Past Medical History:   Past Medical History:   Diagnosis Date    ADHD (attention deficit hyperactivity disorder)     Arthritis     Asthma     Bipolar 1 disorder     Cataract     COPD (chronic obstructive pulmonary disease)     Coronary artery disease     A fib    Depression     bipolar manic depresson    Diabetes mellitus     DVT of lower extremity, bilateral July 2013    bilateral LE DVT. [place] filter placed.     Encounter for blood transfusion     History of blood clots 1. Left Leg=2003; 2.Bilateral Groin=Blood Clots= 5 or 6/ 2013 & 7/2013; 3. LLL of Lung=7/2013;  4. Lt. Lower Leg=7/2013.     Pt. had 1st Blood Clot after [id number], & Last=2013. Estelita " "Filter= Rt.Lateral Neck.    HTN (hypertension) 2013    Pt states that she does not have hypertension    Hypercholesteremia     Irregular heartbeat     Neuromuscular disorder     neuropathy feet    PE (pulmonary embolism) 2013     bilat LE DVT.     Restless leg syndrome        Past Surgical HIstory:   Past Surgical History:   Procedure Laterality Date    ABDOMINAL SURGERY      BILATERAL OOPHORECTOMY Bilateral 2015    gastric sleeve  2016    Green' s filter Right 2012    Right Neck & Tunneled Down.    HERNIA REPAIR      "[facility] of Hernias Repaires around th [place].", pt. states    LAPAROSCOPIC CHOLECYSTECTOMY N/A 9/10/2020    Procedure: CHOLECYSTECTOMY, LAPAROSCOPIC;  Surgeon: Montrell Gutierrez MD;  Location: [facility];  Service: General;  Laterality: N/A;  RN PREOP ----COVID Negative      OOPHORECTOMY      OVARIAN CYST REMOVAL  3/13/2014    OH REMOVAL OF OVARY/TUBE(S)      SPLENECTOMY, TOTAL  2003    TONSILLECTOMY      as a child    TYMPANOSTOMY TUBE PLACEMENT      VEIN SURGERY      Lt leg       Family History:   Family History   Problem Relation Age of Onset    Hypertension Father     Diabetes Father     Heart disease Father     Cataracts Father     Diabetes Paternal Grandfather     Heart disease Paternal Grandfather     No Known Problems Mother     Ovarian cancer Maternal Grandmother          from this. ? age     No Known Problems Sister     No Known Problems Brother     No Known Problems Maternal Aunt     No Known Problems Maternal Uncle     No Known Problems Paternal Aunt     No Known Problems Paternal Uncle     No Known Problems Maternal Grandfather     Ovarian cancer Paternal Grandmother     Uterine cancer Neg Hx     Breast cancer Neg Hx     Colon cancer Neg Hx     Amblyopia Neg Hx     Blindness Neg Hx     Cancer Neg Hx     Glaucoma Neg Hx     Macular degeneration Neg Hx     Retinal detachment Neg Hx     Strabismus Neg Hx  "    Stroke Neg Hx     Thyroid disease Neg Hx        Social History:  reports that she has been smoking cigarettes. She has a 12.50 pack-year smoking history. She has never used smokeless tobacco. She reports that she does not drink alcohol or use drugs.    Allergies:  Review of patient's allergies indicates:   Allergen Reactions    Morphine Other (See Comments)     Patient had a psychotic episode after taking Morphine  Agitation, hallucinations    Penicillins Anaphylaxis     itching    Januvia [sitagliptin] Hives       Medications:  Current Outpatient Medications   Medication Sig Dispense Refill    acetaminophen (ARTHRITIS PAIN RELIEVER) 650 MG TbSR Take 650 mg by mouth. Pt states taking 2 -3 times a day      aspirin (ECOTRIN) 81 MG EC tablet Take 81 mg by mouth once daily.       azelastine (ASTELIN) 137 mcg (0.1 %) nasal spray 1 spray (137 mcg total) by Nasal route 2 (two) times daily. 30 mL 0    b complex vitamins tablet Take 1 tablet by mouth once daily. 90 tablet 2    carbamazepine (TEGRETOL) 200 mg tablet TK 2 TS PO BID  1    ciclopirox (LOPROX) 0.77 % Susp Apply topically once daily. 30 mL 3    ciclopirox-nail lacquer removr 8 % Kit Apply 1 application topically once a week. 1 kit 4    clotrimazole (LOTRIMIN) 1 % cream Apply topically 2 (two) times daily. 28 g 1    cyanocobalamin (VITAMIN B-12) 100 MCG tablet Take 1 tablet (100 mcg total) by mouth once daily. 90 tablet 1    diphenhydrAMINE (BENADRYL) 50 MG capsule Take 1 capsule (50 mg total) by mouth every 6 (six) hours as needed for Itching or Allergies (Swelling of the throat, rash and shortness of breath). 20 capsule 0    DULERA 100-5 mcg/actuation HFAA INHALE 2 PUFFS INTO THE LUNGS TWICE DAILY 13 g 2    DUPIXENT 300 mg/2 mL Syrg inject 300mg SUBCUTANEOUSLY every other week  6    fluticasone propionate (FLONASE) 50 mcg/actuation nasal spray SHAKE LIQUID AND USE 1 SPRAY(50 MCG) IN EACH NOSTRIL TWICE DAILY 16 g 3    furosemide (LASIX) 20 MG  tablet TAKE 1 TABLET(20 MG) BY MOUTH EVERY DAY 90 tablet 2    gabapentin (NEURONTIN) 600 MG tablet TAKE 1 TABLET(600 MG) BY MOUTH TWICE DAILY 180 tablet 2    hydrOXYzine pamoate (VISTARIL) 25 MG Cap       lancets Misc To check BG once daily, to use with insurance preferred meter 30 each 2    lisinopriL (PRINIVIL,ZESTRIL) 5 MG tablet TAKE 1 TABLET(5 MG) BY MOUTH EVERY DAY 90 tablet 2    loratadine (CLARITIN) 10 mg tablet Take 1 tablet (10 mg total) by mouth once daily. 30 tablet 5    meloxicam (MOBIC) 15 MG tablet Take 1 tablet (15 mg total) by mouth once daily. 30 tablet 2    metFORMIN (GLUCOPHAGE) 1000 MG tablet TAKE 1 TABLET(1000 MG) BY MOUTH TWICE DAILY WITH MEALS 180 tablet 2    methocarbamoL (ROBAXIN) 500 MG Tab Take 1 tablet (500 mg total) by mouth 4 (four) times daily as needed. 120 tablet 0    metoprolol tartrate (LOPRESSOR) 50 MG tablet Take 1 tablet (50 mg total) by mouth 2 (two) times daily. 60 tablet 2    mometasone-formoterol (DULERA) 200-5 mcg/actuation inhaler Inhale 1 puff into the lungs 2 (two) times daily. 8.8 g 1    multivitamin (THERAGRAN) per tablet Take 1 tablet by mouth every morning. 90 tablet 2    nicotine (NICODERM CQ) 21 mg/24 hr Place 1 patch onto the skin once daily. 28 patch 0    nicotine polacrilex 2 MG Lozg 1-2 per hour in place of a cigarette. Limit to 10 a day - oral. 144 lozenge 0    NYSTOP powder APPLY TO AFFECTED AREA TWICE DAILY 60 g 2    omega-3 fatty acids/fish oil (FISH OIL-OMEGA-3 FATTY ACIDS) 300-1,000 mg capsule Take 1 capsule by mouth once daily.      pantoprazole (PROTONIX) 40 MG tablet TAKE 1 TABLET(40 MG) BY MOUTH EVERY DAY 30 tablet 5    pravastatin (PRAVACHOL) 40 MG tablet TAKE 1 TABLET(40 MG) BY MOUTH EVERY DAY 90 tablet 2    risperidone (RISPERDAL) 3 MG Tab take at bedtime  1    VYVANSE 50 mg capsule TK ONE C PO QAM  0    albuterol (ACCUNEB) 0.63 mg/3 mL Nebu Take 3 mLs (0.63 mg total) by nebulization every 8 (eight) hours as needed (wheezing).  "Rescue 1 Box 1    albuterol (PROAIR HFA) 90 mcg/actuation inhaler INHALE 2 PUFFS BY MOUTH INTO THE LUNGS EVERY 6 HOURS AS NEEDED FOR WHEEZING. RESCUE 8.5 g 1    blood-glucose meter kit To check BG once daily, to use with insurance preferred meter 1 each 0     No current facility-administered medications for this visit.        Review of Systems   Constitutional: Negative for appetite change, chills, fatigue and fever.   HENT: Positive for congestion. Negative for nosebleeds and sore throat.    Eyes: Negative for photophobia and visual disturbance.   Respiratory: Positive for cough. Negative for shortness of breath.    Cardiovascular: Negative for chest pain and palpitations.   Gastrointestinal: Negative for abdominal pain, diarrhea, nausea and vomiting.   Endocrine: Negative for cold intolerance, heat intolerance, polydipsia and polyuria.   Genitourinary: Negative for dysuria and urgency.   Musculoskeletal: Negative for arthralgias and myalgias.   Skin: Negative for rash and wound.   Neurological: Negative for dizziness and headaches.   Hematological: Negative for adenopathy. Does not bruise/bleed easily.   Psychiatric/Behavioral: Negative for confusion. The patient is not nervous/anxious.        Objective:      Vitals:   Vitals:    11/05/20 0911   BP: 135/68   BP Location: Left arm   Patient Position: Sitting   BP Method: Large (Automatic)   Pulse: 93   Resp: 18   Temp: 98.4 °F (36.9 °C)   SpO2: 97%   Weight: 105.6 kg (232 lb 12.9 oz)   Height: 5' 4.65" (1.642 m)       Physical Exam  Vitals signs reviewed.   Constitutional:       Appearance: Normal appearance. She is obese.   HENT:      Head: Normocephalic and atraumatic.      Mouth/Throat:      Mouth: Mucous membranes are moist.   Eyes:      Extraocular Movements: Extraocular movements intact.      Conjunctiva/sclera: Conjunctivae normal.   Neck:      Musculoskeletal: Normal range of motion and neck supple.   Cardiovascular:      Rate and Rhythm: Normal rate and " regular rhythm.   Pulmonary:      Effort: Pulmonary effort is normal. No respiratory distress.      Breath sounds: Wheezing present.   Abdominal:      General: Bowel sounds are normal.      Palpations: Abdomen is soft.   Musculoskeletal:      Right lower leg: Edema present.      Left lower leg: Edema present.   Skin:     General: Skin is warm and dry.   Neurological:      General: No focal deficit present.      Mental Status: She is alert and oriented to person, place, and time.   Psychiatric:         Mood and Affect: Affect normal.         Behavior: Behavior is cooperative.         Laboratory Data:  Lab Visit on 11/05/2020   Component Date Value Ref Range Status    Pathologist Review 11/05/2020 Review completed   Final    WBC 11/05/2020 14.71* 3.90 - 12.70 K/uL Final    RBC 11/05/2020 4.16  4.00 - 5.40 M/uL Final    Hemoglobin 11/05/2020 11.8* 12.0 - 16.0 g/dL Final    Hematocrit 11/05/2020 37.8  37.0 - 48.5 % Final    MCV 11/05/2020 91  82 - 98 fL Final    MCH 11/05/2020 28.4  27.0 - 31.0 pg Final    MCHC 11/05/2020 31.2* 32.0 - 36.0 g/dL Final    RDW 11/05/2020 14.4  11.5 - 14.5 % Final    Platelets 11/05/2020 442* 150 - 350 K/uL Final    MPV 11/05/2020 9.8  9.2 - 12.9 fL Final    Immature Granulocytes 11/05/2020 0.5  0.0 - 0.5 % Final    Gran # (ANC) 11/05/2020 6.1  1.8 - 7.7 K/uL Final    Immature Grans (Abs) 11/05/2020 0.08* 0.00 - 0.04 K/uL Final    Comment: Mild elevation in immature granulocytes is non specific and   can be seen in a variety of conditions including stress response,   acute inflammation, trauma and pregnancy. Correlation with other   laboratory and clinical findings is essential.      Lymph # 11/05/2020 6.7* 1.0 - 4.8 K/uL Final    Mono # 11/05/2020 1.3* 0.3 - 1.0 K/uL Final    Eos # 11/05/2020 0.5  0.0 - 0.5 K/uL Final    Baso # 11/05/2020 0.12  0.00 - 0.20 K/uL Final    nRBC 11/05/2020 0  0 /100 WBC Final    Gran % 11/05/2020 41.3  38.0 - 73.0 % Final    Lymph %  11/05/2020 45.2  18.0 - 48.0 % Final    Mono % 11/05/2020 8.8  4.0 - 15.0 % Final    Eosinophil % 11/05/2020 3.4  0.0 - 8.0 % Final    Basophil % 11/05/2020 0.8  0.0 - 1.9 % Final    Aniso 11/05/2020 Slight   Final    Poik 11/05/2020 Slight   Final    Poly 11/05/2020 Occasional   Final    Hypo 11/05/2020 Occasional   Final    Ovalocytes 11/05/2020 Occasional   Final    Differential Method 11/05/2020 Automated   Final    Pathologist Review Peripheral Smear 11/05/2020 REVIEWED   Final    Comment: Electronically reviewed and signed by:  Javier Castro M.D.  Signed on 11/05/20 at 14:01  Pathologist review of peripheral blood smear:  -- Leukocytosis.  White blood cells are increased.  Granulocytes are   morphologically unremarkable.  Reactive lymphocytes are seen.  Blasts   are not identified.    -- Red blood cells are adequate and morphologically unremarkable.    -- Thrombocytosis.  Platelets are increased and display occasional   large forms.  Correlation with clinical presentation and other lab results is   required.           Imaging:   CTA CAP 9/29/20  Impression:     1. No evidence of aortic aneurysm or dissection.  2. No acute intrathoracic or intra-abdominal abnormalities identified.  3. Small 4 mm right lower lobe pulmonary nodule.  For a solid nodule <6 mm, Fleischner Society 2017 guidelines recommend no routine follow up for a low risk patient, or follow-up with non-contrast chest CT at 12 months in a high risk patient.  4. Postsurgical changes of cholecystectomy, splenectomy, and sleeve gastrectomy.  5. Additional findings as detailed above.    US 9/29/20  Impression:     1. No acute abnormalities identified.  2. Cholecystectomy.  3. Hepatomegaly with suspected hepatic steatosis.    Assessment:       1. History of DVT (deep vein thrombosis)    2. Leukocytosis, unspecified type           Plan:     Leukocytosis  -- Pt has multiple reasons to have a leukocytosis including obesity, smoking, and hx of  "splenectomy  -- Has had persistent leukocytosis since at least 2011 in our system but pt reports for many years prior to that  -- Has had two bone marrow biopsies that pt states are normal and she does not want another one "for something that has caused me no problems"  -- Discussed that the splenectomy can lead to leukocytosis and thrombocytosis  -- Discussed that smoking can lead to leukocytosis, among many other health issues. Smoking cessation was provided. Pt has been working with a smoking cessation program and believes that is going well. Leukocytosis can persist for several years after cessation.  -- Discussed that obesity can lead to leukocytosis and encouraged weight loss for general health purposes  -- Will check a peripheral smear to assess morphology (morphologically unremarkable per pathologist interpretation)    Hx of DVTs  -- Most recent PCP note asking hematology to weigh in on this issue  -- Previously seen by hematology in September 2019  -- Appointment was too busy to address this issue today, will have RTC in 1 month to further discuss    Benigno Keenan, PGY- IV  Hematology/Oncology Fellow       "

## 2020-11-05 NOTE — LETTER
November 12, 2020      Breanne Carrion MD  [address]  [facility] [address]           [facility]  [address]  [facility] [address]  Phone: [phone]          Patient: Audrey Natarajan   MR Number: [id number]   YOB: 1970   Date of Visit: 11/5/2020       Dear Dr. Breanne Carrion:    Thank you for referring Audrey Natarajan to me for evaluation. Attached you will find relevant portions of my assessment and plan of care.    If you have questions, please do not hesitate to call me. I look forward to following Audrey Natarajan along with you.    Sincerely,    Tennille Joy MD    Enclosure  CC:  No Recipients    If you would like to receive this communication electronically, please contact [email or web address] or [phone] to request more information on [employer] [facility] Link access.    For providers and/or their staff who would like to refer a patient to [facility], please contact us through our one-stop-shop provider referral line, [facility], at [phone].    If you feel you have received this communication in error or would no longer like to receive these types of communications, please e-mail [email or web address]

## 2020-11-06 DIAGNOSIS — B96.89 ACUTE BACTERIAL BRONCHITIS: ICD-10-CM

## 2020-11-06 DIAGNOSIS — J20.8 ACUTE BACTERIAL BRONCHITIS: ICD-10-CM

## 2020-11-06 DIAGNOSIS — J44.9 CHRONIC OBSTRUCTIVE PULMONARY DISEASE, UNSPECIFIED COPD TYPE: Chronic | ICD-10-CM

## 2020-11-06 RX ORDER — ALBUTEROL SULFATE 0.63 MG/3ML
0.63 SOLUTION RESPIRATORY (INHALATION) EVERY 8 HOURS PRN
Qty: 1 BOX | Refills: 1 | Status: ON HOLD | OUTPATIENT
Start: 2020-11-06 | End: 2021-02-20 | Stop reason: HOSPADM

## 2020-11-06 RX ORDER — ALBUTEROL SULFATE 90 UG/1
AEROSOL, METERED RESPIRATORY (INHALATION)
Qty: 8.5 G | Refills: 1 | Status: ON HOLD | OUTPATIENT
Start: 2020-11-06 | End: 2021-02-20 | Stop reason: HOSPADM

## 2020-11-10 ENCOUNTER — TELEPHONE (OUTPATIENT)
Dept: PODIATRY | Facility: CLINIC | Age: 50
End: 2020-11-10

## 2020-11-11 ENCOUNTER — CLINICAL SUPPORT (OUTPATIENT)
Dept: SMOKING CESSATION | Facility: CLINIC | Age: 50
End: 2020-11-11
Payer: COMMERCIAL

## 2020-11-11 ENCOUNTER — HOSPITAL ENCOUNTER (OUTPATIENT)
Dept: RADIOLOGY | Facility: HOSPITAL | Age: 50
Discharge: HOME | End: 2020-11-11
Attending: PODIATRIST
Payer: MEDICAID

## 2020-11-11 ENCOUNTER — OFFICE VISIT (OUTPATIENT)
Dept: PODIATRY | Facility: CLINIC | Age: 50
End: 2020-11-11
Payer: MEDICAID

## 2020-11-11 VITALS
WEIGHT: 232 LBS | DIASTOLIC BLOOD PRESSURE: 78 MMHG | BODY MASS INDEX: 39.61 KG/M2 | SYSTOLIC BLOOD PRESSURE: 143 MMHG | HEIGHT: 64 IN

## 2020-11-11 DIAGNOSIS — M20.41 HAMMER TOES OF BOTH FEET: ICD-10-CM

## 2020-11-11 DIAGNOSIS — E11.49 TYPE II DIABETES MELLITUS WITH NEUROLOGICAL MANIFESTATIONS: ICD-10-CM

## 2020-11-11 DIAGNOSIS — F17.200 NICOTINE DEPENDENCE: Primary | ICD-10-CM

## 2020-11-11 DIAGNOSIS — L97.422 LEFT MIDFOOT ULCER, WITH FAT LAYER EXPOSED: ICD-10-CM

## 2020-11-11 DIAGNOSIS — M20.5X2 HALLUX LIMITUS, ACQUIRED, LEFT: ICD-10-CM

## 2020-11-11 DIAGNOSIS — M20.5X1 HALLUX LIMITUS, ACQUIRED, RIGHT: ICD-10-CM

## 2020-11-11 DIAGNOSIS — E11.49 TYPE II DIABETES MELLITUS WITH NEUROLOGICAL MANIFESTATIONS: Primary | ICD-10-CM

## 2020-11-11 DIAGNOSIS — M20.42 HAMMER TOES OF BOTH FEET: ICD-10-CM

## 2020-11-11 PROCEDURE — 87070 CULTURE OTHR SPECIMN AEROBIC: CPT

## 2020-11-11 PROCEDURE — 87076 CULTURE ANAEROBE IDENT EACH: CPT

## 2020-11-11 PROCEDURE — 99999 PR PBB SHADOW E&M-EST. PATIENT-LVL III: ICD-10-PCS | Mod: PBBFAC,,, | Performed by: PODIATRIST

## 2020-11-11 PROCEDURE — 99214 PR OFFICE/OUTPT VISIT, EST, LEVL IV, 30-39 MIN: ICD-10-PCS | Mod: S$PBB,,, | Performed by: PODIATRIST

## 2020-11-11 PROCEDURE — 73630 XR FOOT COMPLETE 3 VIEW LEFT: ICD-10-PCS | Mod: 26,LT,, | Performed by: RADIOLOGY

## 2020-11-11 PROCEDURE — 99214 OFFICE O/P EST MOD 30 MIN: CPT | Mod: S$PBB,,, | Performed by: PODIATRIST

## 2020-11-11 PROCEDURE — 73630 X-RAY EXAM OF FOOT: CPT | Mod: TC,FY,PO,LT

## 2020-11-11 PROCEDURE — 73630 X-RAY EXAM OF FOOT: CPT | Mod: 26,LT,, | Performed by: RADIOLOGY

## 2020-11-11 PROCEDURE — 99402 PREV MED CNSL INDIV APPRX 30: CPT | Mod: S$GLB,,,

## 2020-11-11 PROCEDURE — 87075 CULTR BACTERIA EXCEPT BLOOD: CPT

## 2020-11-11 PROCEDURE — 99999 PR PBB SHADOW E&M-EST. PATIENT-LVL III: CPT | Mod: PBBFAC,,, | Performed by: PODIATRIST

## 2020-11-11 PROCEDURE — 99213 OFFICE O/P EST LOW 20 MIN: CPT | Mod: PBBFAC,25,PO | Performed by: PODIATRIST

## 2020-11-11 PROCEDURE — 99402 PR PREVENT COUNSEL,INDIV,30 MIN: ICD-10-PCS | Mod: S$GLB,,,

## 2020-11-11 RX ORDER — DOXYCYCLINE HYCLATE 100 MG
100 TABLET ORAL 2 TIMES DAILY
Qty: 20 TABLET | Refills: 0 | Status: SHIPPED | OUTPATIENT
Start: 2020-11-11 | End: 2021-01-13

## 2020-11-11 NOTE — PROGRESS NOTES
Subjective:      Patient ID: Audrey Natarajan is a 50 y.o. female.    Chief Complaint: Diabetes Mellitus (PCP  10/26/2020), Foot Pain, and Foot Problem    Audrey Natarajan is a 50 y.o. female with  has a past medical history of ADHD (attention deficit hyperactivity disorder), Arthritis, Asthma, Bipolar 1 disorder, Cataract, COPD (chronic obstructive pulmonary disease), Coronary artery disease, Depression, Diabetes mellitus, DVT of lower extremity, bilateral, Encounter for blood transfusion, History of blood clots, HTN (hypertension), Hypercholesteremia, Irregular heartbeat, Neuromuscular disorder, PE (pulmonary embolism), and Restless leg syndrome. presents to the podiatry clinic for care of  left foot ulcer.   Location: plantar and midfoot Onset of the symptoms was several weeks ago. Precipitating event: LLE edema and only being able to wear slide on shoes..  History of injury: no Current symptoms include: redness and swelling. Signs of infection denies nausea, vomiting, fever, chills   Symptoms have progressed to a point and plateaued. Patient has had no prior foot problems. Evaluation to date: none. Treatment to date: neosporin. Patients rates pain 6/10 on pain scale.    Shoe gear: Slip-on shoes    Chief Complaint   Patient presents with    Diabetes Mellitus     PCP  10/26/2020    Foot Pain    Foot Problem       Hemoglobin A1C   Date Value Ref Range Status   09/29/2020 7.3 (H) 4.0 - 5.6 % Final     Comment:     ADA Screening Guidelines:  5.7-6.4%  Consistent with prediabetes  >or=6.5%  Consistent with diabetes  High levels of fetal hemoglobin interfere with the HbA1C  assay. Heterozygous hemoglobin variants (HbS, HgC, etc)do  not significantly interfere with this assay.   However, presence of multiple variants may affect accuracy.     09/29/2020 7.3 (H) 4.0 - 5.6 % Final     Comment:     ADA Screening Guidelines:  5.7-6.4%  Consistent with prediabetes  >or=6.5%  Consistent with  diabetes  High levels of fetal hemoglobin interfere with the HbA1C  assay. Heterozygous hemoglobin variants (HbS, HgC, etc)do  not significantly interfere with this assay.   However, presence of multiple variants may affect accuracy.     06/16/2020 7.6 (H) 4.0 - 5.6 % Final     Comment:     ADA Screening Guidelines:  5.7-6.4%  Consistent with prediabetes  >or=6.5%  Consistent with diabetes  High levels of fetal hemoglobin interfere with the HbA1C  assay. Heterozygous hemoglobin variants (HbS, HgC, etc)do  not significantly interfere with this assay.   However, presence of multiple variants may affect accuracy.               Patient Active Problem List   Diagnosis    Essential hypertension    COPD (chronic obstructive pulmonary disease)    Hypertriglyceridemia    Tobacco abuse    Mild protein malnutrition    Diabetic neuropathy    Leg swelling    Incisional hernia without mention of obstruction or gangrene    DM type 2 without retinopathy    History of DVT (deep vein thrombosis)    History of pulmonary embolus (PE)    Bipolar 1 disorder    Gastroparesis due to DM    Thrombocytosis    Leukocytosis    Morbid obesity    Type 2 diabetes mellitus    Acne    Other chronic pain    Vomiting and diarrhea    Nuclear sclerosis of both eyes    Bilateral ocular hypertension    Refractive error    Long term (current) use of anticoagulants    Hypercoagulable state    History of pulmonary embolism    DVT, recurrent, lower extremity, chronic, left    Decreased ROM of ankle    Decreased strength of lower extremity    Balance problem    Gait abnormality    Calculus of gallbladder without cholecystitis without obstruction    Cholelithiasis    RUQ pain    Right upper quadrant abdominal pain    Leucocytosis       Current Outpatient Medications on File Prior to Visit   Medication Sig Dispense Refill    acetaminophen (ARTHRITIS PAIN RELIEVER) 650 MG TbSR Take 650 mg by mouth. Pt states taking 2 -3 times  a day      albuterol (ACCUNEB) 0.63 mg/3 mL Nebu Take 3 mLs (0.63 mg total) by nebulization every 8 (eight) hours as needed (wheezing). Rescue 1 Box 1    albuterol (PROAIR HFA) 90 mcg/actuation inhaler INHALE 2 PUFFS BY MOUTH INTO THE LUNGS EVERY 6 HOURS AS NEEDED FOR WHEEZING. RESCUE 8.5 g 1    aspirin (ECOTRIN) 81 MG EC tablet Take 81 mg by mouth once daily.       azelastine (ASTELIN) 137 mcg (0.1 %) nasal spray 1 spray (137 mcg total) by Nasal route 2 (two) times daily. 30 mL 0    b complex vitamins tablet Take 1 tablet by mouth once daily. 90 tablet 2    carbamazepine (TEGRETOL) 200 mg tablet TK 2 TS PO BID  1    ciclopirox (LOPROX) 0.77 % Susp Apply topically once daily. 30 mL 3    ciclopirox-nail lacquer removr 8 % Kit Apply 1 application topically once a week. 1 kit 4    clotrimazole (LOTRIMIN) 1 % cream Apply topically 2 (two) times daily. 28 g 1    cyanocobalamin (VITAMIN B-12) 100 MCG tablet Take 1 tablet (100 mcg total) by mouth once daily. 90 tablet 1    diphenhydrAMINE (BENADRYL) 50 MG capsule Take 1 capsule (50 mg total) by mouth every 6 (six) hours as needed for Itching or Allergies (Swelling of the throat, rash and shortness of breath). 20 capsule 0    DULERA 100-5 mcg/actuation HFAA INHALE 2 PUFFS INTO THE LUNGS TWICE DAILY 13 g 2    DUPIXENT 300 mg/2 mL Syrg inject 300mg SUBCUTANEOUSLY every other week  6    fluticasone propionate (FLONASE) 50 mcg/actuation nasal spray SHAKE LIQUID AND USE 1 SPRAY(50 MCG) IN EACH NOSTRIL TWICE DAILY 16 g 3    furosemide (LASIX) 20 MG tablet TAKE 1 TABLET(20 MG) BY MOUTH EVERY DAY 90 tablet 2    gabapentin (NEURONTIN) 600 MG tablet TAKE 1 TABLET(600 MG) BY MOUTH TWICE DAILY 180 tablet 2    hydrOXYzine pamoate (VISTARIL) 25 MG Cap       lancets Misc To check BG once daily, to use with insurance preferred meter 30 each 2    lisinopriL (PRINIVIL,ZESTRIL) 5 MG tablet TAKE 1 TABLET(5 MG) BY MOUTH EVERY DAY 90 tablet 2    loratadine (CLARITIN) 10 mg  tablet Take 1 tablet (10 mg total) by mouth once daily. 30 tablet 5    meloxicam (MOBIC) 15 MG tablet Take 1 tablet (15 mg total) by mouth once daily. 30 tablet 2    metFORMIN (GLUCOPHAGE) 1000 MG tablet TAKE 1 TABLET(1000 MG) BY MOUTH TWICE DAILY WITH MEALS 180 tablet 2    methocarbamoL (ROBAXIN) 500 MG Tab Take 1 tablet (500 mg total) by mouth 4 (four) times daily as needed. 120 tablet 0    metoprolol tartrate (LOPRESSOR) 50 MG tablet Take 1 tablet (50 mg total) by mouth 2 (two) times daily. 60 tablet 2    mometasone-formoterol (DULERA) 200-5 mcg/actuation inhaler Inhale 1 puff into the lungs 2 (two) times daily. 8.8 g 1    multivitamin (THERAGRAN) per tablet Take 1 tablet by mouth every morning. 90 tablet 2    nicotine (NICODERM CQ) 21 mg/24 hr Place 1 patch onto the skin once daily. 28 patch 0    nicotine polacrilex 2 MG Lozg 1-2 per hour in place of a cigarette. Limit to 10 a day - oral. 144 lozenge 0    NYSTOP powder APPLY TO AFFECTED AREA TWICE DAILY 60 g 2    omega-3 fatty acids/fish oil (FISH OIL-OMEGA-3 FATTY ACIDS) 300-1,000 mg capsule Take 1 capsule by mouth once daily.      pantoprazole (PROTONIX) 40 MG tablet TAKE 1 TABLET(40 MG) BY MOUTH EVERY DAY 30 tablet 5    pravastatin (PRAVACHOL) 40 MG tablet TAKE 1 TABLET(40 MG) BY MOUTH EVERY DAY 90 tablet 2    risperidone (RISPERDAL) 3 MG Tab take at bedtime  1    VYVANSE 50 mg capsule TK ONE C PO QAM  0    blood-glucose meter kit To check BG once daily, to use with insurance preferred meter 1 each 0     No current facility-administered medications on file prior to visit.        Review of patient's allergies indicates:   Allergen Reactions    Morphine Other (See Comments)     Patient had a psychotic episode after taking Morphine  Agitation, hallucinations    Penicillins Anaphylaxis     itching    Januvia [sitagliptin] Hives       Past Surgical History:   Procedure Laterality Date    ABDOMINAL SURGERY      BILATERAL OOPHORECTOMY Bilateral  "2015    gastric sleeve  2016    Green' s filter Right 2012    Right Neck & Tunneled Down.    HERNIA REPAIR      "Ronan of Hernias Repaires around th Belly Button.", pt. states    LAPAROSCOPIC CHOLECYSTECTOMY N/A 9/10/2020    Procedure: CHOLECYSTECTOMY, LAPAROSCOPIC;  Surgeon: Montrell Gutierrez MD;  Location: [facility];  Service: General;  Laterality: N/A;  RN PREOP ----COVID Negative      OOPHORECTOMY      OVARIAN CYST REMOVAL  3/13/2014    VT REMOVAL OF OVARY/TUBE(S)      SPLENECTOMY, TOTAL  2003    TONSILLECTOMY      as a child    TYMPANOSTOMY TUBE PLACEMENT      VEIN SURGERY      Lt leg       Family History   Problem Relation Age of Onset    Hypertension Father     Diabetes Father     Heart disease Father     Cataracts Father     Diabetes Paternal Grandfather     Heart disease Paternal Grandfather     No Known Problems Mother     Ovarian cancer Maternal Grandmother          from this. ? age     No Known Problems Sister     No Known Problems Brother     No Known Problems Maternal Aunt     No Known Problems Maternal Uncle     No Known Problems Paternal Aunt     No Known Problems Paternal Uncle     No Known Problems Maternal Grandfather     Ovarian cancer Paternal Grandmother     Uterine cancer Neg Hx     Breast cancer Neg Hx     Colon cancer Neg Hx     Amblyopia Neg Hx     Blindness Neg Hx     Cancer Neg Hx     Glaucoma Neg Hx     Macular degeneration Neg Hx     Retinal detachment Neg Hx     Strabismus Neg Hx     Stroke Neg Hx     Thyroid disease Neg Hx        Social History     Socioeconomic History    Marital status: Significant Other     Spouse name: Not on file    Number of children: Not on file    Years of education: Not on file    Highest education level: Not on file   Occupational History    Not on file   Social Needs    Financial resource strain: Not on file    Food insecurity     Worry: Not on file     Inability: Not on file    " "Transportation needs     Medical: Not on file     Non-medical: Not on file   Tobacco Use    Smoking status: Current Every Day Smoker     Packs/day: 0.50     Years: 25.00     Pack years: 12.50     Types: Cigarettes    Smokeless tobacco: Never Used   Substance and Sexual Activity    Alcohol use: No     Alcohol/week: 0.0 standard drinks    Drug use: No    Sexual activity: Yes     Partners: Male   Lifestyle    Physical activity     Days per week: Not on file     Minutes per session: Not on file    Stress: Not on file   Relationships    Social connections     Talks on phone: Not on file     Gets together: Not on file     Attends Yazdanism service: Not on file     Active member of club or organization: Not on file     Attends meetings of clubs or organizations: Not on file     Relationship status: Not on file   Other Topics Concern    Not on file   Social History Narrative    Not on file       Review of Systems   Constitution: Negative for chills and fever.   Cardiovascular: Positive for leg swelling. Negative for claudication.   Respiratory: Negative for cough and shortness of breath.    Skin: Positive for dry skin and nail changes. Negative for itching and rash.   Musculoskeletal: Positive for arthritis, back pain, falls, joint pain and myalgias. Negative for joint swelling and muscle weakness.   Gastrointestinal: Negative for diarrhea, nausea and vomiting.   Neurological: Positive for numbness and paresthesias. Negative for tremors and weakness.   Psychiatric/Behavioral: Negative for altered mental status and hallucinations.           Objective:      Vitals:    11/11/20 1409   BP: (!) 143/78   Weight: 105.2 kg (232 lb)   Height: 5' 4" (1.626 m)   PainSc:   6   PainLoc: Foot       Physical Exam  Nursing note reviewed.   Constitutional:       General: She is not in acute distress.     Appearance: She is not toxic-appearing or diaphoretic.   Cardiovascular:      Pulses:           Dorsalis pedis pulses are 2+ on " the right side and 2+ on the left side.        Posterior tibial pulses are 2+ on the right side and 2+ on the left side.   Pulmonary:      Effort: No respiratory distress.   Musculoskeletal:      Right ankle: She exhibits decreased range of motion and swelling. No tenderness. No lateral malleolus, no medial malleolus, no AITFL, no CF ligament and no posterior TFL tenderness found. Achilles tendon exhibits no pain, no defect and normal Paniagua's test results.      Left ankle: She exhibits decreased range of motion and swelling. Tenderness (anterior shin pain). No lateral malleolus, no medial malleolus, no AITFL, no CF ligament, no posterior TFL and no proximal fibula tenderness found. Achilles tendon exhibits no pain, no defect and normal Paniagua's test results.      Right foot: No bony tenderness.      Left foot: Swelling present.      Comments: Biomechanical exam: There is equinus deformity bilateral with decreased dorsiflexion at the ankle joint bilateral. No tenderness with compression of heel. Negative tinels sign. Gait analysis reveals excessive pronation through midstance and propulsion with early heel off. Shoes reveals lateral heel counter wear bilateral     Decreased first MPJ range of motion both weightbearing and nonweightbearing, no crepitus observed the first MP joint, + dorsal flag sign. Mild  bunion deformity is observed .    Patient has hammertoes of digits 2-5 bilateral partially reducible without symptom today.     Skin:     General: Skin is warm and dry.      Coloration: Skin is not pale.      Findings: Wound present. No bruising, burn, laceration or rash.      Nails: There is no clubbing.        Comments: Ulcer location: sub 1st MTPJ, left  Measurements :1.3x0.4x0.2  cm   Signs of infection: local edema and erythema  Drainage: Sero-Sanguinous  Purulence: no  Crepitus/fluctuance: no  Periwound: Reddened, Macerated, Calloused  Base: Mixed Granular/Fibrotic  Depth: subcutaneous tissue  Probe to  bone: no      Toenails 1-5 bilaterally are thickened by 2-3 mm, discolored/yellowed, dystrophic, brittle with subungual debris. Tender to distal nail plate pressure, without periungual skin abnormality of each.       Neurological:      Sensory: No sensory deficit.      Motor: No tremor, atrophy or abnormal muscle tone.      Deep Tendon Reflexes: Reflexes are normal and symmetric.      Comments: Paresthesias, and hyperesthesia bilateral feet at toes with no clearly identified trigger or source.    Home-Gilberto 5.07 monofilament is intact bilateral feet. Sharp/dull sensation is also intact Bilateral feet.   Psychiatric:         Attention and Perception: She is attentive.         Mood and Affect: Mood is not anxious. Affect is not inappropriate.         Speech: She is communicative. Speech is not slurred.         Behavior: Behavior is not combative.               Assessment:       Encounter Diagnoses   Name Primary?    Type II diabetes mellitus with neurological manifestations Yes    Left midfoot ulcer, with fat layer exposed     Hallux limitus, acquired, right     Hallux limitus, acquired, left     Hammer toes of both feet          Plan:     Problem List Items Addressed This Visit     None      Visit Diagnoses     Type II diabetes mellitus with neurological manifestations    -  Primary    Relevant Orders    Aerobic culture    Culture, Anaerobic    X-Ray Foot Complete Left    Left midfoot ulcer, with fat layer exposed        Relevant Orders    Aerobic culture    Culture, Anaerobic    X-Ray Foot Complete Left    Hallux limitus, acquired, right        Hallux limitus, acquired, left        Hammer toes of both feet             Orders Placed This Encounter    Aerobic culture    Culture, Anaerobic    X-Ray Foot Complete Left    doxycycline (VIBRA-TABS) 100 MG tablet   ]    I counseled the patient on her conditions, their implications and medical management.      Greater than 50% of this visit spent on counseling  and coordination of care.    Education about the diabetic foot, neuropathy, and prevention of limb loss.    Discussed wound healing cycle, skin integrity, ways to care for skin.Counseled patient on the effects of smoking and  high blood glucose on healing. She verbalizes understanding that it can increase the chances of delayed healing and this prolonged exposure leads to infection or progression of infection which subsequently can result in loss of limb.    Adequate vitamin supplementation, protein intake, and hydration - discussed with patient    Imaging ordered to rule of bony involvement or gas in the soft tissues.     The wound is cleansed of foreign material as much as possible and the base inspected for bone or abscess. Base is granular and without bone nor joint exposure.  Aerobic and anerobic cultures swabs taken    Dressings: endoform and iodosorb  Offloading: coflex football    Follow-up: 1 week but should call [facility] immediately if any signs of infection, such as fever, chills, sweats, increased redness or pain.    Short-term goals include maintaining good offloading and minimizing bioburden, promoting granulation and epithelialization to healing.  Long-term goals include keeping the wound healed by good offloading and medical management under the direction of internist.    Shoe inspection. Diabetic Foot Education. Patient reminded of the importance of good nutrition and blood sugar control to help prevent podiatric complications of diabetes. Patient instructed on proper foot hygeine. We discussed wearing proper shoe gear, daily foot inspections, never walking without protective shoe gear, never putting sharp instruments to feet.          Procedures

## 2020-11-11 NOTE — PATIENT INSTRUCTIONS
Please keep football dressing clean, dry, and intact.  If dressing gets wet please contact our office.    Wear special shoe every time foot is placed on the floor.    Elevate affected foot as much as possible    Stay hydrated.      Nutrition and MyPlate: Protein Foods  This group includes foods that are high in protein. Protein helps the body build new cells and keeps tissues healthy. Most Americans get enough protein without even trying. It can be harder for vegetarians, but plenty of non-meat foods are rich in protein, too. Its best to get protein from a variety of sources.    Nutrient-Rich Choices  Theres a lot more to this food group than just meat and beans. It also includes nuts, seeds, and eggs. There are all sorts of nutrient-rich choices:  · Chicken and turkey with the skin removed  · Fish and shellfish  · Lean beef, pork, or lamb (without visible fat)  · Soy products, such as tofu, soybeans (edamame), tempeh, or soymilk  · Black beans, kidney beans, haas beans, chickpeas (garbanzo beans), and lentils (Note: beans and peas count as both a protein and a vegetable)  · Peanuts, almonds, walnuts, sesame seeds, and sunflower  seeds, as well as foods made from these (such as peanut butter or tahini)  · Eggs and foods made with eggs (such as quiche or frittata)  What Makes Meat and Beans Less Healthy?  · Fatty meat is not healthy. Before you cook meat, trim off all the fat you can see. Chicken and turkey skin is also high in fat, and should be removed before cooking.  · Breading and frying make food less healthy. This includes dishes like fried chicken, fried fish, and refried beans.  · Sausage and lunch meats tend to be high in fat and salt. Buy low-fat, low-sodium versions.  One Small Change  Make a meal that includes a non-meat source of protein (such as tofu, lentils, or any other food listed above). Have a better idea? Write it here:  _____________________________________________________________  ©  7780-3494 [employer]. [address] [place] [address], [place] [address]. All rights reserved. This information is not intended as a substitute for professional medical care. Always follow your healthcare professional's instructions.

## 2020-11-11 NOTE — PROGRESS NOTES
Individual Follow-Up Form    11/11/2020    Quit Date: TBD    Clinical Status of Patient: Outpatient    Length of Service: 30 minutes    Continuing Medication: yes  Patches or Nicotine Lozenges    Other Medications: none     Target Symptoms: Withdrawal and medication side effects. The following were  rated moderate (3) to severe (4) on TCRS:  · Moderate (3): none  · Severe (4): none    Comments: Telephonic visit due to Covid 19 pandemic.     Patient continues to smoke . Pt remains on tobacco cessation medication of  21 mg nicotine patch QD and 2 mg nicotine lozenge  PRN (1-2 per hour in place of cigarettes.) No adverse effects noted at this time.  Reviewed strategies, controlling environment, cues, triggers, new goals set. Introduced high risk situations with preparation interventions, caffeine similarities with withdrawal issues of habit and nicotine, Alcohol, Understanding urges, cravings, stress and relaxation.       Diagnosis: F17.200    Next Visit: 2 weeks

## 2020-11-12 ENCOUNTER — OFFICE VISIT (OUTPATIENT)
Dept: HEPATOLOGY | Facility: CLINIC | Age: 50
End: 2020-11-12
Payer: MEDICAID

## 2020-11-12 ENCOUNTER — PROCEDURE VISIT (OUTPATIENT)
Dept: HEPATOLOGY | Facility: CLINIC | Age: 50
End: 2020-11-12
Payer: MEDICAID

## 2020-11-12 VITALS
WEIGHT: 231.94 LBS | HEIGHT: 64 IN | DIASTOLIC BLOOD PRESSURE: 59 MMHG | HEART RATE: 89 BPM | SYSTOLIC BLOOD PRESSURE: 122 MMHG | BODY MASS INDEX: 39.6 KG/M2 | OXYGEN SATURATION: 97 %

## 2020-11-12 DIAGNOSIS — K76.0 FATTY LIVER: Primary | ICD-10-CM

## 2020-11-12 DIAGNOSIS — Z98.84 HISTORY OF BARIATRIC SURGERY: ICD-10-CM

## 2020-11-12 DIAGNOSIS — K74.00 LIVER FIBROSIS: ICD-10-CM

## 2020-11-12 DIAGNOSIS — R16.0 HEPATOMEGALY: ICD-10-CM

## 2020-11-12 DIAGNOSIS — Z23 NEED FOR PROPHYLACTIC VACCINATION AGAINST HEPATITIS A AND HEPATITIS B: ICD-10-CM

## 2020-11-12 PROCEDURE — 99999 PR PBB SHADOW E&M-EST. PATIENT-LVL V: ICD-10-PCS | Mod: PBBFAC,,, | Performed by: NURSE PRACTITIONER

## 2020-11-12 PROCEDURE — 91200 LIVER ELASTOGRAPHY: CPT | Mod: PBBFAC | Performed by: NURSE PRACTITIONER

## 2020-11-12 PROCEDURE — 99204 OFFICE O/P NEW MOD 45 MIN: CPT | Mod: S$PBB,,, | Performed by: NURSE PRACTITIONER

## 2020-11-12 PROCEDURE — 91200 FIBROSCAN (VIBRATION CONTROLLED TRANSIENT ELASTOGRAPHY): ICD-10-PCS | Mod: 26,S$PBB,, | Performed by: NURSE PRACTITIONER

## 2020-11-12 PROCEDURE — 99999 PR PBB SHADOW E&M-EST. PATIENT-LVL V: CPT | Mod: PBBFAC,,, | Performed by: NURSE PRACTITIONER

## 2020-11-12 PROCEDURE — 99215 OFFICE O/P EST HI 40 MIN: CPT | Mod: PBBFAC | Performed by: NURSE PRACTITIONER

## 2020-11-12 PROCEDURE — 99204 PR OFFICE/OUTPT VISIT, NEW, LEVL IV, 45-59 MIN: ICD-10-PCS | Mod: S$PBB,,, | Performed by: NURSE PRACTITIONER

## 2020-11-12 PROCEDURE — 91200 LIVER ELASTOGRAPHY: CPT | Mod: 26,S$PBB,, | Performed by: NURSE PRACTITIONER

## 2020-11-12 NOTE — PROGRESS NOTES
[facility] VISIT NEW PT NOTE    REFERRING PROVIDER:  Aaareferral Self    CHIEF COMPLAINT: Fatty Liver    HPI: Ms. Natarjaan is a 50 y.o. White female with PMH noted below, presenting for evaluation of fatty liver disease. Recent abdominal imaging has shown hepatomegaly with hepatic steatosis. She is S/P gastric sleeve placement in 2016 by Dr. Corey Mauro. She underwent cholecystectomy in September, but is still having abdominal and back pain. She has also had a splenectomy in 2003, while living in [place]. Risk factors for the development of fatty liver disease include HLD, DMII and obesity (BMI 39). Last HgbA1c in late September was 7.3%. Viral hepatitis testing and HIV are negative. Reassuringly, her LFT's remain normal. Fibroscan in clinic today to stage liver disease is suggestive of significant hepatic steatosis with F2-F3 fibrosis.   She denies any known family history of liver disease. She does not drink alcohol. She denies jaundice, dark urine, abdominal distention, hematemesis, melena, slowed mentation. No abnormal skin rashes or itching.     Review of patient's allergies indicates:   Allergen Reactions    Morphine Other (See Comments)     Patient had a psychotic episode after taking Morphine  Agitation, hallucinations    Penicillins Anaphylaxis     itching    Januvia [sitagliptin] Hives     Current Outpatient Medications on File Prior to Visit   Medication Sig Dispense Refill    acetaminophen (ARTHRITIS PAIN RELIEVER) 650 MG TbSR Take 650 mg by mouth. Pt states taking 2 -3 times a day      albuterol (ACCUNEB) 0.63 mg/3 mL Nebu Take 3 mLs (0.63 mg total) by nebulization every 8 (eight) hours as needed (wheezing). Rescue 1 Box 1    albuterol (PROAIR HFA) 90 mcg/actuation inhaler INHALE 2 PUFFS BY MOUTH INTO THE LUNGS EVERY 6 HOURS AS NEEDED FOR WHEEZING. RESCUE 8.5 g 1    aspirin (ECOTRIN) 81 MG EC tablet Take 81 mg by mouth once daily.       azelastine (ASTELIN) 137 mcg (0.1 %) nasal  spray 1 spray (137 mcg total) by Nasal route 2 (two) times daily. 30 mL 0    b complex vitamins tablet Take 1 tablet by mouth once daily. 90 tablet 2    carbamazepine (TEGRETOL) 200 mg tablet TK 2 TS PO BID  1    ciclopirox (LOPROX) 0.77 % Susp Apply topically once daily. 30 mL 3    ciclopirox-nail lacquer removr 8 % Kit Apply 1 application topically once a week. 1 kit 4    clotrimazole (LOTRIMIN) 1 % cream Apply topically 2 (two) times daily. 28 g 1    cyanocobalamin (VITAMIN B-12) 100 MCG tablet Take 1 tablet (100 mcg total) by mouth once daily. 90 tablet 1    diphenhydrAMINE (BENADRYL) 50 MG capsule Take 1 capsule (50 mg total) by mouth every 6 (six) hours as needed for Itching or Allergies (Swelling of the throat, rash and shortness of breath). 20 capsule 0    doxycycline (VIBRA-TABS) 100 MG tablet Take 1 tablet (100 mg total) by mouth 2 (two) times daily. 20 tablet 0    DULERA 100-5 mcg/actuation HFAA INHALE 2 PUFFS INTO THE LUNGS TWICE DAILY 13 g 2    DUPIXENT 300 mg/2 mL Syrg inject 300mg SUBCUTANEOUSLY every other week  6    fluticasone propionate (FLONASE) 50 mcg/actuation nasal spray SHAKE LIQUID AND USE 1 SPRAY(50 MCG) IN EACH NOSTRIL TWICE DAILY 16 g 3    furosemide (LASIX) 20 MG tablet TAKE 1 TABLET(20 MG) BY MOUTH EVERY DAY 90 tablet 2    gabapentin (NEURONTIN) 600 MG tablet TAKE 1 TABLET(600 MG) BY MOUTH TWICE DAILY 180 tablet 2    hydrOXYzine pamoate (VISTARIL) 25 MG Cap       lancets Misc To check BG once daily, to use with insurance preferred meter 30 each 2    lisinopriL (PRINIVIL,ZESTRIL) 5 MG tablet TAKE 1 TABLET(5 MG) BY MOUTH EVERY DAY 90 tablet 2    loratadine (CLARITIN) 10 mg tablet Take 1 tablet (10 mg total) by mouth once daily. 30 tablet 5    meloxicam (MOBIC) 15 MG tablet Take 1 tablet (15 mg total) by mouth once daily. 30 tablet 2    metFORMIN (GLUCOPHAGE) 1000 MG tablet TAKE 1 TABLET(1000 MG) BY MOUTH TWICE DAILY WITH MEALS 180 tablet 2    methocarbamoL (ROBAXIN)  500 MG Tab Take 1 tablet (500 mg total) by mouth 4 (four) times daily as needed. 120 tablet 0    metoprolol tartrate (LOPRESSOR) 50 MG tablet Take 1 tablet (50 mg total) by mouth 2 (two) times daily. 60 tablet 2    mometasone-formoterol (DULERA) 200-5 mcg/actuation inhaler Inhale 1 puff into the lungs 2 (two) times daily. 8.8 g 1    multivitamin (THERAGRAN) per tablet Take 1 tablet by mouth every morning. 90 tablet 2    nicotine (NICODERM CQ) 21 mg/24 hr Place 1 patch onto the skin once daily. 28 patch 0    nicotine polacrilex 2 MG Lozg 1-2 per hour in place of a cigarette. Limit to 10 a day - oral. 144 lozenge 0    NYSTOP powder APPLY TO AFFECTED AREA TWICE DAILY 60 g 2    omega-3 fatty acids/fish oil (FISH OIL-OMEGA-3 FATTY ACIDS) 300-1,000 mg capsule Take 1 capsule by mouth once daily.      pantoprazole (PROTONIX) 40 MG tablet TAKE 1 TABLET(40 MG) BY MOUTH EVERY DAY 30 tablet 5    pravastatin (PRAVACHOL) 40 MG tablet TAKE 1 TABLET(40 MG) BY MOUTH EVERY DAY 90 tablet 2    risperidone (RISPERDAL) 3 MG Tab take at bedtime  1    VYVANSE 50 mg capsule TK ONE C PO QAM  0    blood-glucose meter kit To check BG once daily, to use with insurance preferred meter 1 each 0     No current facility-administered medications on file prior to visit.      PMHX:  has a past medical history of ADHD (attention deficit hyperactivity disorder), Arthritis, Asthma, Bipolar 1 disorder, Cataract, COPD (chronic obstructive pulmonary disease), Coronary artery disease, Depression, Diabetes mellitus, DVT of lower extremity, bilateral (July 2013), Encounter for blood transfusion, History of blood clots (1. Left Leg=2003; 2.Bilateral Groin=Blood Clots= 5 or 6/ 2013 & 7/2013; 3. LLL of Lung=7/2013;  4. Lt. Lower Leg=7/2013. ), HTN (hypertension) (6/6/2013), Hypercholesteremia, Irregular heartbeat, Neuromuscular disorder, PE (pulmonary embolism) (July 2013 ), and Restless leg syndrome.    PSHX:  has a past surgical history that  "includes Splenectomy, total (July 2003); Vein Surgery (2003); Green' s filter (Right, 7/4/2012); Tonsillectomy; Abdominal surgery; Ovarian cyst removal (3/13/2014); Hernia repair; Bilateral oophorectomy (Bilateral, 1/12/2015); pr removal of ovary/tube(s); Tympanostomy tube placement (1976); Oophorectomy; gastric sleeve (2016); and Laparoscopic cholecystectomy (N/A, 9/10/2020).    FAMILY HISTORY: Negative for liver disease, reviewed [facility]    SOCIAL HISTORY:   Social History     Tobacco Use   Smoking Status Current Every Day Smoker    Packs/day: 0.50    Years: 25.00    Pack years: 12.50    Types: Cigarettes   Smokeless Tobacco Never Used     Social History     Substance and Sexual Activity   Alcohol Use No    Alcohol/week: 0.0 standard drinks     Social History     Substance and Sexual Activity   Drug Use No     ROS:   GENERAL: Denies fever, chills, weight loss/gain, fatigue  HEENT: Denies headaches, dizziness, vision/hearing changes  CARDIOVASCULAR: Denies chest pain, palpitations, or edema  RESPIRATORY: Denies dyspnea, cough  GI: Denies abdominal pain, rectal bleeding, nausea, vomiting. No change in bowel pattern or color  : Denies dysuria, hematuria   SKIN: Denies rash, itching   NEURO: Denies confusion, memory loss, or mood changes  PSYCH: Denies depression or anxiety  HEME/LYMPH: Denies easy bruising or bleeding    PHYSICAL EXAM:   Friendly White female, in no acute distress; alert and oriented to person, place and time; not able to ambulate well, due to diabetic foot ulcer, currently in wheelchair.  VITALS: BP (!) 122/59 (BP Location: Right arm, Patient Position: Sitting, BP Method: Medium (Automatic))   Pulse 89   Ht 5' 4" (1.626 m)   Wt 105.2 kg (231 lb 14.8 oz)   LMP 11/01/2011 (LMP Unknown) Comment: stated when she was 39  SpO2 97%   BMI 39.81 kg/m²   HENT: Normocephalic, without obvious abnormality.   EYES: Sclerae anicteric. No conjunctival pallor.   NECK: Supple. No masses or cervical " adenopathy.  CARDIOVASCULAR: Regular rate and rhythm. No murmurs.  RESPIRATORY: Normal respiratory effort. BBS CTA. No wheezes or crackles.  GI: Soft, non-tender, non-distended, obese abdomen. No masses palpable. No ascites.  EXTREMITIES:  No clubbing, or cyanosis; mild lower extremity edema.  SKIN: Warm and dry. No jaundice. No rashes noted to exposed skin. No telangectasias noted. No palmar erythema.  NEURO:  Normal gait. No asterixis.  PSYCH:  Memory intact. Thought and speech pattern appropriate. Behavior normal. No depression or anxiety noted.    DIAGNOSTIC STUDIES:    US ABDOMEN LIMITED 9/29/2020:     FINDINGS:  The liver is enlarged measuring 23.1cm.  Hepatic parenchyma is diffusely echogenic in appearance most suggestive for diffuse fatty infiltration.  No intra- or extrahepatic biliary ductal dilatation. The common bile duct measures 0.4 cm.  The gallbladder has been removed. The visualized portion of the pancreas appears normal.  No ascites.     Impression:     1. No acute abnormalities identified.  2. Cholecystectomy.  3. Hepatomegaly with suspected hepatic steatosis.    CT ABDOMEN PELVIS WITH CONTRAST 9/6/2020:    Abdomen:     Liver is enlarged, measuring greater than 20 cm in craniocaudal length.  Mild diffuse hypoattenuation of the hepatic parenchyma suggestive of steatosis.  No suspicious hepatic mass.  Gallbladder demonstrates a small amount of layering hyperdense fluid, most likely biliary sludge or small gallstones.  No pericholecystic inflammatory changes.  No intrahepatic biliary ductal dilatation.     Spleen is surgically absent, noting a small amount of residual splenic tissue in the left upper quadrant adjacent to multiple surgical staples.  Adrenal glands are unremarkable.  Pancreas is unremarkable.     The kidneys are symmetric.  Duplex left collecting system noted.  No hydronephrosis.     Postoperative changes of sleeve gastrectomy.  No small bowel obstruction.  Scattered colonic  diverticula without evidence of diverticulitis.  The appendix is normal.     No pneumoperitoneum or organized fluid collection.     No bulky lymphadenopathy.     Abdominal aorta is normal in caliber with mild calcific atherosclerosis.     Portal, splenic, and superior mesenteric veins are patent.  IVC filter is present.     Pelvis:     Urinary bladder, pelvic organs, and rectum are unremarkable.  No free fluid in the pelvis.  No pelvic lymphadenopathy.     Bones and soft tissues:     No aggressive osseous lesions.  Postoperative changes of the abdominal wall with abdominal wall laxity and multiple stable small fat containing ventral hernias.  Mild body wall edema.     Impression:     No acute findings in the abdomen or pelvis.     Biliary sludge versus small gallstones.  No pericholecystic inflammatory changes.     Hepatomegaly and hepatic steatosis.     Splenectomy, sleeve gastrectomy, and IVC filter placement.     Additional incidental findings discussed in the body of the report.     FIBROSCAN 11/12/2020:    Findings  Median liver stiffness score:  9.8  CAP Reading: dB/m:  373     IQR/med %:  12  Interpretation  Fibrosis interpretation is based on medial liver stiffness - Kilopascal (kPa).     Fibrosis Stage:  F2  Steatosis interpretation is based on controlled attenuation parameter - (dB/m).     Steatosis Grade:  S3     EDUCATION:  We discussed the manifestations of non-alcoholic fatty liver disease. At this time, there are no FDA approved therapy for non-alcoholic fatty liver disease.  The patient and I discussed the importance of diet, exercise, and weight loss for management of NAFLD. We discussed a low fat, low carb/sugar, high fiber diet and a goal of 30 minutes of exercise 5 times per week.  Discussed that fatty liver can progress to steatohepatitis and possibly to cirrhosis, putting one at increased risk for liver cancer, liver failure, and death  Recommend to avoid alcohol consumption, as it is known  that heavy alcohol use is associated with disease progression among patients with fatty liver disease. Information is unknown at this time of the effects on the liver with alcohol use in moderation.    ASSESSMENT & PLAN:  50 y.o. White female with fatty liver disease.     - Fibroscan to stage liver disease.   - Repeat abdominal ultrasound.   - Recommend vaccination for Hepatitis A and B. Prescription was sent to [facility].  - Referral placed to nutritionist. Please call [phone] to schedule an appointment.  - Recommend weight loss goal of 25 lbs.  - Recommend low carb/sugar, high fiber and protein diet.  - Recommend aerobic exercise, 5 days per week, 30 minutes per day, as tolerated.  - Recommend good cholesterol, blood pressure, blood sugar levels.    Follow up in about 3 months (around 2/12/2021).     Thank you for allowing me to participate in the care of Audrey Natarajan       Hepatology Nurse Practitioner  [facility]  11/12/2020 @ 1400    CC'ed note to:   Self, Aaareferral  Donaldo Pena MD

## 2020-11-12 NOTE — PROCEDURES
FibroScan (Vibration Controlled Transient Elastography)    Date/Time: 11/12/2020 2:15 PM  Performed by: Isabel Gardiner NP  Authorized by: Isabel Gardiner NP     Diagnosis:  NAFLD    Probe:  XL    Universal Protocol: Patient's identity, procedure and site were verified, confirmatory pause was performed.  Discussed procedure including risks and potential complications.  Questions answered.  Patient verbalizes understanding and wishes to proceed with VCTE.     Procedure: After providing explanations of the procedure, patient was placed in the supine position with right arm in maximum abduction to allow optimal exposure of right lateral abdomen.  Patient was briefly assessed, Testing was performed in the mid-axillary location, 50Hz Shear Wave pulses were applied and the resulting Shear Wave and Propagation Speed detected with a 3.5 MHz ultrasonic signal, using the FibroScan probe, Skin to liver capsule distance and liver parenchyma were accessed during the entire examination with the FibroScan probe, Patient was instructed to breathe normally and to abstain from sudden movements during the procedure, allowing for random measurements of liver stiffness. At least 10 Shear Waves were produced, Individual measurements of each Shear Wave were calculated.  Patient tolerated the procedure well with no complications.  Meets discharge criteria as was dismissed.  Rates pain 0 out of 10.  Patient will follow up with ordering provider to review results.      Findings  Median liver stiffness score:  9.8  CAP Reading: dB/m:  373    IQR/med %:  12  Interpretation  Fibrosis interpretation is based on medial liver stiffness - Kilopascal (kPa).    Fibrosis Stage:  F2  Steatosis interpretation is based on controlled attenuation parameter - (dB/m).    Steatosis Grade:  S3

## 2020-11-12 NOTE — PATIENT INSTRUCTIONS
1. Fibroscan today to assess for fat or scar tissue in the liver. Fibroscan is suggestive of moderate fibrosis (scarring) and significant hepatic steatosis (fat in the liver).   2. Repeat abdominal ultrasound.   3. Recommend vaccination for Hepatitis A and B. Prescription was sent to [facility].  4. Referral placed to nutritionist. Please call [phone] to schedule an appointment.  5.  Follow up in 3 months.     There is no FDA approved therapy for non-alcoholic fatty liver disease. Therefore, these things are important:  1. Weight loss goal of 25 lbs.  2. Low carb/sugar, high fiber and protein diet.Try to limit your carb intake to LESS than 30-45 grams of carbs with a meal or LESS than 5-10 grams with any snack (total of any snack foods eaten during that time). Use MyFitness Pal cecelia to add up your carbs through the day. Do NOT drink any beverages with calories or carbs (this can lead to high blood sugar and weight gain). Also, some of our patients have been very successful with weight loss using the pre made/planned meal planning services that limit calories and portion size (one example is Sensible Meals but there are many out there)  3. Exercise, 5 days per week, 30 minutes per day, as tolerated.  4. Recommend good cholesterol, blood pressure, blood sugar levels.    In some people, fatty liver can progress to steatohepatitis (inflamatory fatty liver) and possibly to cirrhosis, putting one at increased risk for liver cancer, liver failure, and death. Therefore, the lifestyle changes are very important to decrease this risk.     Website with information about fatty liver and inflammation related to fatty liver (PAYNE) = www.nashtruth.com  AND www.NASHactually.com

## 2020-11-13 LAB — BACTERIA SPEC AEROBE CULT: NORMAL

## 2020-11-16 LAB
BACTERIA SPEC ANAEROBE CULT: ABNORMAL
BACTERIA SPEC ANAEROBE CULT: ABNORMAL

## 2020-11-17 ENCOUNTER — TELEPHONE (OUTPATIENT)
Dept: HEPATOLOGY | Facility: CLINIC | Age: 50
End: 2020-11-17

## 2020-11-17 ENCOUNTER — HOSPITAL ENCOUNTER (OUTPATIENT)
Dept: RADIOLOGY | Facility: HOSPITAL | Age: 50
Discharge: HOME | End: 2020-11-17
Attending: NURSE PRACTITIONER
Payer: MEDICAID

## 2020-11-17 DIAGNOSIS — K74.00 LIVER FIBROSIS: ICD-10-CM

## 2020-11-17 DIAGNOSIS — R16.0 HEPATOMEGALY: ICD-10-CM

## 2020-11-17 DIAGNOSIS — K76.0 FATTY LIVER: ICD-10-CM

## 2020-11-17 PROCEDURE — 93975 US LIVER WITH DOPPLER: ICD-10-PCS | Mod: 26,,, | Performed by: RADIOLOGY

## 2020-11-17 PROCEDURE — 76705 ECHO EXAM OF ABDOMEN: CPT | Mod: 26,XS,, | Performed by: RADIOLOGY

## 2020-11-17 PROCEDURE — 76705 ECHO EXAM OF ABDOMEN: CPT | Mod: TC

## 2020-11-17 PROCEDURE — 93975 VASCULAR STUDY: CPT | Mod: 26,,, | Performed by: RADIOLOGY

## 2020-11-17 PROCEDURE — 76705 US LIVER WITH DOPPLER: ICD-10-PCS | Mod: 26,XS,, | Performed by: RADIOLOGY

## 2020-11-17 PROCEDURE — 93975 VASCULAR STUDY: CPT | Mod: TC

## 2020-11-17 NOTE — TELEPHONE ENCOUNTER
----- Message from Isabel Gardiner NP sent at 11/17/2020  9:38 AM CST -----  Good Morning Ashwini,    Please call and let patient know that ultrasound looks fine. She does have an enlarged liver, but no ascites or focal lesions were visualized, and doppler studies are normal. If she continues to have abdominal pain, we can refer her to GI for further work up.  She is S/P recent cholecystectomy, so should also follow up with general surgeon, as planned.     Thanks,    Isabel

## 2020-11-17 NOTE — TELEPHONE ENCOUNTER
Patient called and message relayed from Isabel Alves NP  Voiced understanding. Will schedule and appt  with her Surgeon before she has hernia surgery in the beginning of next year because of ongoing abd pains.

## 2020-11-18 ENCOUNTER — OFFICE VISIT (OUTPATIENT)
Dept: PODIATRY | Facility: CLINIC | Age: 50
End: 2020-11-18
Payer: MEDICAID

## 2020-11-18 VITALS — BODY MASS INDEX: 39.44 KG/M2 | WEIGHT: 231 LBS | HEIGHT: 64 IN

## 2020-11-18 DIAGNOSIS — E11.49 TYPE II DIABETES MELLITUS WITH NEUROLOGICAL MANIFESTATIONS: Primary | ICD-10-CM

## 2020-11-18 DIAGNOSIS — L97.422 LEFT MIDFOOT ULCER, WITH FAT LAYER EXPOSED: ICD-10-CM

## 2020-11-18 PROCEDURE — 99999 PR PBB SHADOW E&M-EST. PATIENT-LVL V: CPT | Mod: PBBFAC,,, | Performed by: PODIATRIST

## 2020-11-18 PROCEDURE — 99999 PR PBB SHADOW E&M-EST. PATIENT-LVL V: ICD-10-PCS | Mod: PBBFAC,,, | Performed by: PODIATRIST

## 2020-11-18 PROCEDURE — 99213 PR OFFICE/OUTPT VISIT, EST, LEVL III, 20-29 MIN: ICD-10-PCS | Mod: S$PBB,,, | Performed by: PODIATRIST

## 2020-11-18 PROCEDURE — 99215 OFFICE O/P EST HI 40 MIN: CPT | Mod: PBBFAC,PO | Performed by: PODIATRIST

## 2020-11-18 PROCEDURE — 99213 OFFICE O/P EST LOW 20 MIN: CPT | Mod: S$PBB,,, | Performed by: PODIATRIST

## 2020-11-18 NOTE — PROGRESS NOTES
Subjective:      Patient ID: Audrey Natarajan is a 50 y.o. female.    Chief Complaint: Diabetes Mellitus (PCP  10/26/2020) and Wound Check    Audrey Natarajan is a 50 y.o. female with  has a past medical history of ADHD (attention deficit hyperactivity disorder), Arthritis, Asthma, Bipolar 1 disorder, Cataract, COPD (chronic obstructive pulmonary disease), Coronary artery disease, Depression, Diabetes mellitus, DVT of lower extremity, bilateral, Encounter for blood transfusion, History of blood clots, HTN (hypertension), Hypercholesteremia, Irregular heartbeat, Neuromuscular disorder, PE (pulmonary embolism), and Restless leg syndrome. presents to the podiatry clinic for care of  left foot ulcer.   Location: plantar and midfoot Onset of the symptoms was several weeks ago. Precipitating event: LLE edema and only being able to wear slide on shoes..  History of injury: no Current symptoms include: redness and swelling. Signs of infection denies nausea, vomiting, fever, chills   Symptoms have progressed to a point and plateaued. Patient has had no prior foot problems. Evaluation to date: none. Treatment to date: neosporin. Patients rates pain 6/10 on pain scale.    11/18/2020 Audrey Natarajan is a 50 y.o. female returns to clinic for follow up of  left foot ulcers.  Patient has left football dressing clean, dry, intact.  Patient denies pain. No new pedal complaints. She has been taking antibiotics as prescribed without known adverse reaction    Chief Complaint   Patient presents with    Diabetes Mellitus     PCP  10/26/2020    Wound Check       Hemoglobin A1C   Date Value Ref Range Status   09/29/2020 7.3 (H) 4.0 - 5.6 % Final     Comment:     ADA Screening Guidelines:  5.7-6.4%  Consistent with prediabetes  >or=6.5%  Consistent with diabetes  High levels of fetal hemoglobin interfere with the HbA1C  assay. Heterozygous hemoglobin variants (HbS, HgC, etc)do  not significantly interfere  with this assay.   However, presence of multiple variants may affect accuracy.     09/29/2020 7.3 (H) 4.0 - 5.6 % Final     Comment:     ADA Screening Guidelines:  5.7-6.4%  Consistent with prediabetes  >or=6.5%  Consistent with diabetes  High levels of fetal hemoglobin interfere with the HbA1C  assay. Heterozygous hemoglobin variants (HbS, HgC, etc)do  not significantly interfere with this assay.   However, presence of multiple variants may affect accuracy.     06/16/2020 7.6 (H) 4.0 - 5.6 % Final     Comment:     ADA Screening Guidelines:  5.7-6.4%  Consistent with prediabetes  >or=6.5%  Consistent with diabetes  High levels of fetal hemoglobin interfere with the HbA1C  assay. Heterozygous hemoglobin variants (HbS, HgC, etc)do  not significantly interfere with this assay.   However, presence of multiple variants may affect accuracy.               Patient Active Problem List   Diagnosis    Essential hypertension    COPD (chronic obstructive pulmonary disease)    Hypertriglyceridemia    Tobacco abuse    Mild protein malnutrition    Diabetic neuropathy    Leg swelling    Incisional hernia without mention of obstruction or gangrene    DM type 2 without retinopathy    History of DVT (deep vein thrombosis)    History of pulmonary embolus (PE)    Bipolar 1 disorder    Gastroparesis due to DM    Thrombocytosis    Leukocytosis    Morbid obesity    Type 2 diabetes mellitus    Acne    Other chronic pain    Vomiting and diarrhea    Nuclear sclerosis of both eyes    Bilateral ocular hypertension    Refractive error    Long term (current) use of anticoagulants    Hypercoagulable state    History of pulmonary embolism    DVT, recurrent, lower extremity, chronic, left    Decreased ROM of ankle    Decreased strength of lower extremity    Balance problem    Gait abnormality    Calculus of gallbladder without cholecystitis without obstruction    Cholelithiasis    RUQ pain    Right upper quadrant  abdominal pain    Leucocytosis    Fatty liver    Hepatomegaly    History of bariatric surgery    Need for prophylactic vaccination against hepatitis A and hepatitis B    Liver fibrosis       Current Outpatient Medications on File Prior to Visit   Medication Sig Dispense Refill    acetaminophen (ARTHRITIS PAIN RELIEVER) 650 MG TbSR Take 650 mg by mouth. Pt states taking 2 -3 times a day      albuterol (ACCUNEB) 0.63 mg/3 mL Nebu Take 3 mLs (0.63 mg total) by nebulization every 8 (eight) hours as needed (wheezing). Rescue 1 Box 1    albuterol (PROAIR HFA) 90 mcg/actuation inhaler INHALE 2 PUFFS BY MOUTH INTO THE LUNGS EVERY 6 HOURS AS NEEDED FOR WHEEZING. RESCUE 8.5 g 1    aspirin (ECOTRIN) 81 MG EC tablet Take 81 mg by mouth once daily.       azelastine (ASTELIN) 137 mcg (0.1 %) nasal spray 1 spray (137 mcg total) by Nasal route 2 (two) times daily. 30 mL 0    b complex vitamins tablet Take 1 tablet by mouth once daily. 90 tablet 2    carbamazepine (TEGRETOL) 200 mg tablet TK 2 TS PO BID  1    ciclopirox (LOPROX) 0.77 % Susp Apply topically once daily. 30 mL 3    ciclopirox-nail lacquer removr 8 % Kit Apply 1 application topically once a week. 1 kit 4    clotrimazole (LOTRIMIN) 1 % cream Apply topically 2 (two) times daily. 28 g 1    cyanocobalamin (VITAMIN B-12) 100 MCG tablet Take 1 tablet (100 mcg total) by mouth once daily. 90 tablet 1    diphenhydrAMINE (BENADRYL) 50 MG capsule Take 1 capsule (50 mg total) by mouth every 6 (six) hours as needed for Itching or Allergies (Swelling of the throat, rash and shortness of breath). 20 capsule 0    doxycycline (VIBRA-TABS) 100 MG tablet Take 1 tablet (100 mg total) by mouth 2 (two) times daily. 20 tablet 0    DULERA 100-5 mcg/actuation HFAA INHALE 2 PUFFS INTO THE LUNGS TWICE DAILY 13 g 2    DUPIXENT 300 mg/2 mL Syrg inject 300mg SUBCUTANEOUSLY every other week  6    fluticasone propionate (FLONASE) 50 mcg/actuation nasal spray SHAKE LIQUID AND USE  1 SPRAY(50 MCG) IN EACH NOSTRIL TWICE DAILY 16 g 3    furosemide (LASIX) 20 MG tablet TAKE 1 TABLET(20 MG) BY MOUTH EVERY DAY 90 tablet 2    gabapentin (NEURONTIN) 600 MG tablet TAKE 1 TABLET(600 MG) BY MOUTH TWICE DAILY 180 tablet 2    hydrOXYzine pamoate (VISTARIL) 25 MG Cap       lancets Misc To check BG once daily, to use with insurance preferred meter 30 each 2    lisinopriL (PRINIVIL,ZESTRIL) 5 MG tablet TAKE 1 TABLET(5 MG) BY MOUTH EVERY DAY 90 tablet 2    loratadine (CLARITIN) 10 mg tablet Take 1 tablet (10 mg total) by mouth once daily. 30 tablet 5    meloxicam (MOBIC) 15 MG tablet Take 1 tablet (15 mg total) by mouth once daily. 30 tablet 2    metFORMIN (GLUCOPHAGE) 1000 MG tablet TAKE 1 TABLET(1000 MG) BY MOUTH TWICE DAILY WITH MEALS 180 tablet 2    methocarbamoL (ROBAXIN) 500 MG Tab Take 1 tablet (500 mg total) by mouth 4 (four) times daily as needed. 120 tablet 0    metoprolol tartrate (LOPRESSOR) 50 MG tablet Take 1 tablet (50 mg total) by mouth 2 (two) times daily. 60 tablet 2    mometasone-formoterol (DULERA) 200-5 mcg/actuation inhaler Inhale 1 puff into the lungs 2 (two) times daily. 8.8 g 1    multivitamin (THERAGRAN) per tablet Take 1 tablet by mouth every morning. 90 tablet 2    nicotine (NICODERM CQ) 21 mg/24 hr Place 1 patch onto the skin once daily. 28 patch 0    nicotine polacrilex 2 MG Lozg 1-2 per hour in place of a cigarette. Limit to 10 a day - oral. 144 lozenge 0    NYSTOP powder APPLY TO AFFECTED AREA TWICE DAILY 60 g 2    omega-3 fatty acids/fish oil (FISH OIL-OMEGA-3 FATTY ACIDS) 300-1,000 mg capsule Take 1 capsule by mouth once daily.      pantoprazole (PROTONIX) 40 MG tablet TAKE 1 TABLET(40 MG) BY MOUTH EVERY DAY 30 tablet 5    pravastatin (PRAVACHOL) 40 MG tablet TAKE 1 TABLET(40 MG) BY MOUTH EVERY DAY 90 tablet 2    risperidone (RISPERDAL) 3 MG Tab take at bedtime  1    VYVANSE 50 mg capsule TK ONE C PO QAM  0    blood-glucose meter kit To check BG once  "daily, to use with insurance preferred meter 1 each 0     No current facility-administered medications on file prior to visit.        Review of patient's allergies indicates:   Allergen Reactions    Morphine Other (See Comments)     Patient had a psychotic episode after taking Morphine  Agitation, hallucinations    Penicillins Anaphylaxis     itching    Januvia [sitagliptin] Hives       Past Surgical History:   Procedure Laterality Date    ABDOMINAL SURGERY      BILATERAL OOPHORECTOMY Bilateral 2015    gastric sleeve  2016    Green' s filter Right 2012    Right Neck & Tunneled Down.    HERNIA REPAIR      "[place] [facility] Hernias Repaires around th [place].", pt. states    LAPAROSCOPIC CHOLECYSTECTOMY N/A 9/10/2020    Procedure: CHOLECYSTECTOMY, LAPAROSCOPIC;  Surgeon: Montrell Gutierrez MD;  Location: [facility] OR;  Service: General;  Laterality: N/A;  RN PREOP ----COVID Negative      OOPHORECTOMY      OVARIAN CYST REMOVAL  3/13/2014    MN REMOVAL OF OVARY/TUBE(S)      SPLENECTOMY, TOTAL  2003    TONSILLECTOMY      as a child    TYMPANOSTOMY TUBE PLACEMENT  1976    VEIN SURGERY      Lt leg       Family History   Problem Relation Age of Onset    Hypertension Father     Diabetes Father     Heart disease Father     Cataracts Father     Diabetes Paternal Grandfather     Heart disease Paternal Grandfather     No Known Problems Mother     Ovarian cancer Maternal Grandmother          from this. ? age     No Known Problems Sister     No Known Problems Brother     No Known Problems Maternal Aunt     No Known Problems Maternal Uncle     No Known Problems Paternal Aunt     No Known Problems Paternal Uncle     No Known Problems Maternal Grandfather     Ovarian cancer Paternal Grandmother     Uterine cancer Neg Hx     Breast cancer Neg Hx     Colon cancer Neg Hx     Amblyopia Neg Hx     Blindness Neg Hx     Cancer Neg Hx     Glaucoma Neg Hx     Macular degeneration Neg " Hx     Retinal detachment Neg Hx     Strabismus Neg Hx     Stroke Neg Hx     Thyroid disease Neg Hx        Social History     Socioeconomic History    Marital status: Significant Other     Spouse name: Not on file    Number of children: Not on file    Years of education: Not on file    Highest education level: Not on file   Occupational History    Not on file   Social Needs    Financial resource strain: Not on file    Food insecurity     Worry: Not on file     Inability: Not on file    Transportation needs     Medical: Not on file     Non-medical: Not on file   Tobacco Use    Smoking status: Current Every Day Smoker     Packs/day: 0.50     Years: 25.00     Pack years: 12.50     Types: Cigarettes    Smokeless tobacco: Never Used   Substance and Sexual Activity    Alcohol use: No     Alcohol/week: 0.0 standard drinks    Drug use: No    Sexual activity: Yes     Partners: Male   Lifestyle    Physical activity     Days per week: Not on file     Minutes per session: Not on file    Stress: Not on file   Relationships    Social connections     Talks on phone: Not on file     Gets together: Not on file     Attends Yarsani service: Not on file     Active member of club or organization: Not on file     Attends meetings of clubs or organizations: Not on file     Relationship status: Not on file   Other Topics Concern    Not on file   Social History Narrative    Not on file       Review of Systems   Constitution: Negative for chills and fever.   Cardiovascular: Positive for leg swelling. Negative for claudication.   Respiratory: Negative for cough and shortness of breath.    Skin: Positive for dry skin and nail changes. Negative for itching and rash.   Musculoskeletal: Positive for arthritis, back pain, falls, joint pain and myalgias. Negative for joint swelling and muscle weakness.   Gastrointestinal: Negative for diarrhea, nausea and vomiting.   Neurological: Positive for numbness and paresthesias.  "Negative for tremors and weakness.   Psychiatric/Behavioral: Negative for altered mental status and hallucinations.           Objective:      Vitals:    11/18/20 1419   Weight: 104.8 kg (231 lb)   Height: 5' 4" (1.626 m)   PainSc: 0-No pain       Physical Exam  Nursing note reviewed.   Constitutional:       General: She is not in acute distress.     Appearance: She is not toxic-appearing or diaphoretic.   Cardiovascular:      Pulses:           Dorsalis pedis pulses are 2+ on the right side and 2+ on the left side.        Posterior tibial pulses are 2+ on the right side and 2+ on the left side.   Pulmonary:      Effort: No respiratory distress.   Musculoskeletal:      Right ankle: She exhibits decreased range of motion and swelling. No tenderness. No lateral malleolus, no medial malleolus, no AITFL, no CF ligament and no posterior TFL tenderness found. Achilles tendon exhibits no pain, no defect and normal Paniagua's test results.      Left ankle: She exhibits decreased range of motion and swelling. Tenderness (anterior shin pain). No lateral malleolus, no medial malleolus, no AITFL, no CF ligament, no posterior TFL and no proximal fibula tenderness found. Achilles tendon exhibits no pain, no defect and normal Paniagua's test results.      Right foot: No bony tenderness.      Left foot: Swelling present.      Comments: Biomechanical exam: There is equinus deformity bilateral with decreased dorsiflexion at the ankle joint bilateral. No tenderness with compression of heel. Negative tinels sign. Gait analysis reveals excessive pronation through midstance and propulsion with early heel off. Shoes reveals lateral heel counter wear bilateral     Decreased first MPJ range of motion both weightbearing and nonweightbearing, no crepitus observed the first MP joint, + dorsal flag sign. Mild  bunion deformity is observed .    Patient has hammertoes of digits 2-5 bilateral partially reducible without symptom today.     Skin:     " General: Skin is warm and dry.      Coloration: Skin is not pale.      Findings: Wound present. No bruising, burn, laceration or rash.      Nails: There is no clubbing.        Comments: Ulcer location: sub 1st MTPJ, left  Measurements :0.4x0.2x0.2  cm   Signs of infection: local edema and erythema  Drainage: Sero-Sanguinous  Purulence: no  Crepitus/fluctuance: no  Periwound: Reddened, Macerated, Calloused  Base: Mixed Granular/Fibrotic  Depth: subcutaneous tissue  Probe to bone: no      Toenails 1-5 bilaterally are thickened by 2-3 mm, discolored/yellowed, dystrophic, brittle with subungual debris. Tender to distal nail plate pressure, without periungual skin abnormality of each.       Neurological:      Sensory: No sensory deficit.      Motor: No tremor, atrophy or abnormal muscle tone.      Deep Tendon Reflexes: Reflexes are normal and symmetric.      Comments: Paresthesias, and hyperesthesia bilateral feet at toes with no clearly identified trigger or source.    [place] 5.07 monofilament is intact bilateral feet. Sharp/dull sensation is also intact Bilateral feet.   Psychiatric:         Attention and Perception: She is attentive.         Mood and Affect: Mood is not anxious. Affect is not inappropriate.         Speech: She is communicative. Speech is not slurred.         Behavior: Behavior is not combative.       11/18/2020 11/11/2020                  Assessment:       Encounter Diagnoses   Name Primary?    Type II diabetes mellitus with neurological manifestations Yes    Left midfoot ulcer, with fat layer exposed          Plan:     Problem List Items Addressed This Visit     None      Visit Diagnoses     Type II diabetes mellitus with neurological manifestations    -  Primary    Left midfoot ulcer, with fat layer exposed              ]    I counseled the patient on her conditions, their implications and medical management.      Greater than 50% of this visit spent on counseling and  coordination of care.    Education about the diabetic foot, neuropathy, and prevention of limb loss.    Discussed wound healing cycle, skin integrity, ways to care for skin.Counseled patient on the effects of smoking and  high blood glucose on healing. She verbalizes understanding that it can increase the chances of delayed healing and this prolonged exposure leads to infection or progression of infection which subsequently can result in loss of limb.    Adequate vitamin supplementation, protein intake, and hydration - discussed with patient    The wound is cleansed of foreign material as much as possible and the base inspected for bone or abscess. Base is granular and without bone nor joint exposure.     Dressings: shani  Offloading: football    Follow-up: 1 week but should call [facility] immediately if any signs of infection, such as fever, chills, sweats, increased redness or pain.    Short-term goals include maintaining good offloading and minimizing bioburden, promoting granulation and epithelialization to healing.  Long-term goals include keeping the wound healed by good offloading and medical management under the direction of internist.    Shoe inspection. Diabetic Foot Education. Patient reminded of the importance of good nutrition and blood sugar control to help prevent podiatric complications of diabetes. Patient instructed on proper foot hygeine. We discussed wearing proper shoe gear, daily foot inspections, never walking without protective shoe gear, never putting sharp instruments to feet.          Procedures

## 2020-11-24 ENCOUNTER — TELEPHONE (OUTPATIENT)
Dept: SURGERY | Facility: CLINIC | Age: 50
End: 2020-11-24

## 2020-11-24 ENCOUNTER — HOSPITAL ENCOUNTER (EMERGENCY)
Facility: HOSPITAL | Age: 50
Discharge: HOME | End: 2020-11-24
Attending: EMERGENCY MEDICINE
Payer: MEDICAID

## 2020-11-24 VITALS
DIASTOLIC BLOOD PRESSURE: 71 MMHG | RESPIRATION RATE: 18 BRPM | WEIGHT: 230 LBS | BODY MASS INDEX: 39.27 KG/M2 | TEMPERATURE: 98 F | SYSTOLIC BLOOD PRESSURE: 151 MMHG | HEART RATE: 85 BPM | OXYGEN SATURATION: 97 % | HEIGHT: 64 IN

## 2020-11-24 DIAGNOSIS — R51.9 THROBBING HEADACHE: ICD-10-CM

## 2020-11-24 DIAGNOSIS — R07.9 CHEST PAIN: Primary | ICD-10-CM

## 2020-11-24 LAB
ALBUMIN SERPL BCP-MCNC: 3.7 G/DL (ref 3.5–5.2)
ALP SERPL-CCNC: 96 U/L (ref 55–135)
ALT SERPL W/O P-5'-P-CCNC: 14 U/L (ref 10–44)
ANION GAP SERPL CALC-SCNC: 13 MMOL/L (ref 8–16)
AST SERPL-CCNC: 12 U/L (ref 10–40)
BASOPHILS # BLD AUTO: 0.13 K/UL (ref 0–0.2)
BASOPHILS NFR BLD: 0.9 % (ref 0–1.9)
BILIRUB SERPL-MCNC: 0.1 MG/DL (ref 0.1–1)
BILIRUB UR QL STRIP: NEGATIVE
BUN SERPL-MCNC: 10 MG/DL (ref 6–20)
CALCIUM SERPL-MCNC: 8.7 MG/DL (ref 8.7–10.5)
CHLORIDE SERPL-SCNC: 99 MMOL/L (ref 95–110)
CLARITY UR: CLEAR
CO2 SERPL-SCNC: 25 MMOL/L (ref 23–29)
COLOR UR: NORMAL
CREAT SERPL-MCNC: 0.7 MG/DL (ref 0.5–1.4)
DIFFERENTIAL METHOD: ABNORMAL
EOSINOPHIL # BLD AUTO: 0.4 K/UL (ref 0–0.5)
EOSINOPHIL NFR BLD: 3 % (ref 0–8)
ERYTHROCYTE [DISTWIDTH] IN BLOOD BY AUTOMATED COUNT: 14.6 % (ref 11.5–14.5)
EST. GFR  (AFRICAN AMERICAN): >60 ML/MIN/1.73 M^2
EST. GFR  (NON AFRICAN AMERICAN): >60 ML/MIN/1.73 M^2
GLUCOSE SERPL-MCNC: 93 MG/DL (ref 70–110)
GLUCOSE UR QL STRIP: NEGATIVE
HCT VFR BLD AUTO: 35.5 % (ref 37–48.5)
HGB BLD-MCNC: 11.8 G/DL (ref 12–16)
HGB UR QL STRIP: NEGATIVE
IMM GRANULOCYTES # BLD AUTO: 0.07 K/UL (ref 0–0.04)
IMM GRANULOCYTES NFR BLD AUTO: 0.5 % (ref 0–0.5)
KETONES UR QL STRIP: NEGATIVE
LEUKOCYTE ESTERASE UR QL STRIP: NEGATIVE
LIPASE SERPL-CCNC: 41 U/L (ref 4–60)
LYMPHOCYTES # BLD AUTO: 6.6 K/UL (ref 1–4.8)
LYMPHOCYTES NFR BLD: 44.7 % (ref 18–48)
MCH RBC QN AUTO: 28.6 PG (ref 27–31)
MCHC RBC AUTO-ENTMCNC: 33.2 G/DL (ref 32–36)
MCV RBC AUTO: 86 FL (ref 82–98)
MICROSCOPIC COMMENT: NORMAL
MONOCYTES # BLD AUTO: 1.3 K/UL (ref 0.3–1)
MONOCYTES NFR BLD: 9.1 % (ref 4–15)
NEUTROPHILS # BLD AUTO: 6.2 K/UL (ref 1.8–7.7)
NEUTROPHILS NFR BLD: 41.8 % (ref 38–73)
NITRITE UR QL STRIP: NEGATIVE
NRBC BLD-RTO: 0 /100 WBC
PH UR STRIP: 6 [PH] (ref 5–8)
PLATELET # BLD AUTO: 443 K/UL (ref 150–350)
PMV BLD AUTO: 9.8 FL (ref 9.2–12.9)
POTASSIUM SERPL-SCNC: 4.2 MMOL/L (ref 3.5–5.1)
PROT SERPL-MCNC: 6.4 G/DL (ref 6–8.4)
PROT UR QL STRIP: NEGATIVE
RBC # BLD AUTO: 4.12 M/UL (ref 4–5.4)
SODIUM SERPL-SCNC: 137 MMOL/L (ref 136–145)
SP GR UR STRIP: 1.01 (ref 1–1.03)
SQUAMOUS #/AREA URNS HPF: 3 /HPF
TROPONIN I SERPL DL<=0.01 NG/ML-MCNC: 0.01 NG/ML (ref 0–0.03)
URN SPEC COLLECT METH UR: NORMAL
UROBILINOGEN UR STRIP-ACNC: NEGATIVE EU/DL
WBC # BLD AUTO: 14.73 K/UL (ref 3.9–12.7)

## 2020-11-24 PROCEDURE — 25500020 PHARM REV CODE 255: Performed by: EMERGENCY MEDICINE

## 2020-11-24 PROCEDURE — 80053 COMPREHEN METABOLIC PANEL: CPT

## 2020-11-24 PROCEDURE — 83690 ASSAY OF LIPASE: CPT

## 2020-11-24 PROCEDURE — 96361 HYDRATE IV INFUSION ADD-ON: CPT

## 2020-11-24 PROCEDURE — 25000003 PHARM REV CODE 250: Performed by: NURSE PRACTITIONER

## 2020-11-24 PROCEDURE — 96376 TX/PRO/DX INJ SAME DRUG ADON: CPT

## 2020-11-24 PROCEDURE — 63600175 PHARM REV CODE 636 W HCPCS: Performed by: NURSE PRACTITIONER

## 2020-11-24 PROCEDURE — 96374 THER/PROPH/DIAG INJ IV PUSH: CPT | Mod: 59

## 2020-11-24 PROCEDURE — 93010 ELECTROCARDIOGRAM REPORT: CPT | Mod: ,,, | Performed by: INTERNAL MEDICINE

## 2020-11-24 PROCEDURE — 93005 ELECTROCARDIOGRAM TRACING: CPT

## 2020-11-24 PROCEDURE — 96375 TX/PRO/DX INJ NEW DRUG ADDON: CPT | Mod: 59

## 2020-11-24 PROCEDURE — 99285 EMERGENCY DEPT VISIT HI MDM: CPT | Mod: 25

## 2020-11-24 PROCEDURE — 85025 COMPLETE CBC W/AUTO DIFF WBC: CPT

## 2020-11-24 PROCEDURE — 93010 EKG 12-LEAD: ICD-10-PCS | Mod: ,,, | Performed by: INTERNAL MEDICINE

## 2020-11-24 PROCEDURE — 84484 ASSAY OF TROPONIN QUANT: CPT

## 2020-11-24 PROCEDURE — 81000 URINALYSIS NONAUTO W/SCOPE: CPT

## 2020-11-24 PROCEDURE — 63600175 PHARM REV CODE 636 W HCPCS: Performed by: EMERGENCY MEDICINE

## 2020-11-24 RX ORDER — FAMOTIDINE 20 MG/1
20 TABLET, FILM COATED ORAL 2 TIMES DAILY
Qty: 60 TABLET | Refills: 0 | Status: ON HOLD | OUTPATIENT
Start: 2020-11-24 | End: 2021-02-20 | Stop reason: SDUPTHER

## 2020-11-24 RX ORDER — DICYCLOMINE HYDROCHLORIDE 20 MG/1
20 TABLET ORAL 2 TIMES DAILY
Qty: 20 TABLET | Refills: 0 | Status: SHIPPED | OUTPATIENT
Start: 2020-11-24 | End: 2020-12-24

## 2020-11-24 RX ORDER — ONDANSETRON 2 MG/ML
4 INJECTION INTRAMUSCULAR; INTRAVENOUS
Status: COMPLETED | OUTPATIENT
Start: 2020-11-24 | End: 2020-11-24

## 2020-11-24 RX ORDER — HYDROMORPHONE HYDROCHLORIDE 2 MG/ML
0.5 INJECTION, SOLUTION INTRAMUSCULAR; INTRAVENOUS; SUBCUTANEOUS
Status: COMPLETED | OUTPATIENT
Start: 2020-11-24 | End: 2020-11-24

## 2020-11-24 RX ORDER — SIMETHICONE 125 MG
125 CAPSULE ORAL 4 TIMES DAILY PRN
Qty: 30 CAPSULE | Refills: 0 | Status: ON HOLD | OUTPATIENT
Start: 2020-11-24 | End: 2021-02-20 | Stop reason: HOSPADM

## 2020-11-24 RX ORDER — HYDROMORPHONE HYDROCHLORIDE 2 MG/ML
0.5 INJECTION, SOLUTION INTRAMUSCULAR; INTRAVENOUS; SUBCUTANEOUS
Status: DISCONTINUED | OUTPATIENT
Start: 2020-11-24 | End: 2020-11-24 | Stop reason: HOSPADM

## 2020-11-24 RX ADMIN — HYDROMORPHONE HYDROCHLORIDE 0.5 MG: 2 INJECTION, SOLUTION INTRAMUSCULAR; INTRAVENOUS; SUBCUTANEOUS at 02:11

## 2020-11-24 RX ADMIN — HYDROMORPHONE HYDROCHLORIDE 0.5 MG: 2 INJECTION, SOLUTION INTRAMUSCULAR; INTRAVENOUS; SUBCUTANEOUS at 04:11

## 2020-11-24 RX ADMIN — SODIUM CHLORIDE 1000 ML: 0.9 INJECTION, SOLUTION INTRAVENOUS at 02:11

## 2020-11-24 RX ADMIN — IOHEXOL 100 ML: 350 INJECTION, SOLUTION INTRAVENOUS at 04:11

## 2020-11-24 RX ADMIN — ONDANSETRON 4 MG: 2 INJECTION INTRAMUSCULAR; INTRAVENOUS at 02:11

## 2020-11-24 NOTE — TELEPHONE ENCOUNTER
Spoke with pt advised her to go to the ED pt states that pain is at 10/10. Pt verbalized understanding and will be heading to the ED.    ----- Message from Hayes Vieyra sent at 11/24/2020 11:43 AM CST -----  Regarding: self  Type: Patient Call Back    Who called: Self    What is the request in detail: pt stated she is still having the same pain she had prior to her surgery. Please call to discuss    Can the clinic reply by [facility]? no    Would the patient rather a call back or a response via [facility]? Call     Best call back number: [phone]

## 2020-11-24 NOTE — ED PROVIDER NOTES
"Encounter Date: 11/24/2020    SCRIBE #1 NOTE: I, Liliana Meade, am scribing for, and in the presence of,  Zain Campos DNP. I have scribed the following portions of the note - Other sections scribed: HPI, ROS, PE.       History     Chief Complaint   Patient presents with    Abdominal Pain     abd pain radiating to back s/p gallbladder surg sept 10     CC: Abdominal Pain    HPI: This 50 y.o female, with a medical history of ADHD, arthritis, asthma, bipolar 1 disorder, COPD, coronary artery disease, diabetes mellitus, DVT of lower extremity, blood clots, hypertension, hypercholesteremia, irregular heartbeat, neuromuscular disorder, pulmonary embolism, and restless leg syndrome, presents to the ED c/o constant and severe (10/10) abdominal pain radiating through to the back (from the shoulder blades down the back). Pt reports that she underwent a cholecystectomy on 9/10/2020 (preformed by Dr. Gutierrez) and has since continued to experience flares of the same pain to the abdomen. She states that she has also been experiencing diarrhea, nausea, non-radiating right sided chest pain (earlier today; lasted about 15 minutes), a "flushed" feeling and elevated blood pressure. She notes taking Ibuprofen and Extra Strength Tylenol for treatment without any relief. Additionally, pt reports chronic wounds and swelling to the left foot. She notes that she is followed by podiatry (Dr. De Los Santos). Pt denies fever, chills, headache, blurred vision, ear pain, ear discharge, neck stiffness, emesis, constipation, rash, vaginal bleeding, vaginal discharge, dysuria, bowel or bladder incontinence or numbness. No other associated symptoms. No alleviating factors.     The history is provided by the patient.     Review of patient's allergies indicates:   Allergen Reactions    Morphine Other (See Comments)     Patient had a psychotic episode after taking Morphine  Agitation, hallucinations    Penicillins Anaphylaxis     itching    Januvia " "[sitagliptin] Hives     Past Medical History:   Diagnosis Date    ADHD (attention deficit hyperactivity disorder)     Arthritis     Asthma     Bipolar 1 disorder     Cataract     COPD (chronic obstructive pulmonary disease)     Coronary artery disease     A fib    Depression     bipolar manic depresson    Diabetes mellitus     DVT of lower extremity, bilateral July 2013    bilateral LE DVT. [place] filter placed.     Encounter for blood transfusion     History of blood clots 1. Left Leg=2003; 2.Bilateral Groin=Blood Clots= 5 or 6/ 2013 & 7/2013; 3. LLL of Lung=7/2013;  4. Lt. Lower Leg=7/2013.     Pt. had 1st Blood Clot after [id number], & Last=2013. [place] Filter= Rt.Lateral Neck.    HTN (hypertension) 6/6/2013    Pt states that she does not have hypertension    Hypercholesteremia     Irregular heartbeat     Neuromuscular disorder     neuropathy feet    PE (pulmonary embolism) July 2013     bilat LE DVT.     Restless leg syndrome      Past Surgical History:   Procedure Laterality Date    ABDOMINAL SURGERY      BILATERAL OOPHORECTOMY Bilateral 1/12/2015    gastric sleeve  2016    Green' s filter Right 7/4/2012    Right Neck & Tunneled Down.    HERNIA REPAIR      "[place] [facility] [employer] [employer] around th [place].", pt. states    LAPAROSCOPIC CHOLECYSTECTOMY N/A 9/10/2020    Procedure: CHOLECYSTECTOMY, LAPAROSCOPIC;  Surgeon: Montrell Gutierrez MD;  Location: [facility];  Service: General;  Laterality: N/A;  RN PREOP 9/9----COVID Negative  9/9    OOPHORECTOMY      OVARIAN CYST REMOVAL  3/13/2014    PA REMOVAL OF OVARY/TUBE(S)      SPLENECTOMY, TOTAL  July 2003    TONSILLECTOMY      as a child    TYMPANOSTOMY TUBE PLACEMENT  1976    VEIN SURGERY  2003    Lt leg     Family History   Problem Relation Age of Onset    Hypertension Father     Diabetes Father     Heart disease Father     Cataracts Father     Diabetes Paternal Grandfather     Heart disease Paternal Grandfather  " "   No Known Problems Mother     Ovarian cancer Maternal Grandmother          from this. ? age     No Known Problems Sister     No Known Problems Brother     No Known Problems Maternal Aunt     No Known Problems Maternal Uncle     No Known Problems Paternal Aunt     No Known Problems Paternal Uncle     No Known Problems Maternal Grandfather     Ovarian cancer Paternal Grandmother     Uterine cancer Neg Hx     Breast cancer Neg Hx     Colon cancer Neg Hx     Amblyopia Neg Hx     Blindness Neg Hx     Cancer Neg Hx     Glaucoma Neg Hx     Macular degeneration Neg Hx     Retinal detachment Neg Hx     Strabismus Neg Hx     Stroke Neg Hx     Thyroid disease Neg Hx      Social History     Tobacco Use    Smoking status: Current Every Day Smoker     Packs/day: 0.50     Years: 25.00     Pack years: 12.50     Types: Cigarettes    Smokeless tobacco: Never Used   Substance Use Topics    Alcohol use: No     Alcohol/week: 0.0 standard drinks    Drug use: No     Review of Systems   Constitutional: Negative for chills and fever.        (+) "flushed" feeling; (+) elevated blood pressure   HENT: Negative for ear discharge, ear pain and sore throat.    Eyes: Negative for visual disturbance.   Respiratory: Negative for shortness of breath.    Cardiovascular: Positive for chest pain (right sided) and leg swelling (to the left foot).   Gastrointestinal: Positive for abdominal pain (radiating to back), diarrhea and nausea. Negative for constipation and vomiting.   Genitourinary: Negative for dysuria, vaginal bleeding and vaginal discharge.   Musculoskeletal: Positive for back pain (from shoulder blades down th back). Negative for neck stiffness.   Skin: Positive for wound (to the left foot). Negative for rash.   Neurological: Negative for weakness, numbness and headaches.       Physical Exam     Initial Vitals [20 1321]   BP Pulse Resp Temp SpO2   (S) (!) 199/89 93 18 98.2 °F (36.8 °C) 98 %      MAP     "   --         Physical Exam    Nursing note and vitals reviewed.  Constitutional: She appears well-developed and well-nourished. She is not diaphoretic. No distress.   HENT:   Head: Normocephalic and atraumatic.   Eyes: Pupils are equal, round, and reactive to light.   Neck: Neck supple.   Cardiovascular: Regular rhythm. Tachycardia present.    Pulmonary/Chest: Breath sounds normal. No respiratory distress. She has no wheezes. She has no rhonchi. She has no rales.   Abdominal: Soft. There is no abdominal tenderness. There is CVA tenderness.   There is minimal CVA tenderness present.    Musculoskeletal: Edema present.        Left foot: Comments: 0.7cm resolving wound to the anterior instep ball of the foot.  Foot was in football dressing which I removed to assess the wound      Comments: There is 2+ pitting edema present to the bilateral lower extremities.   Neurological: She is alert and oriented to person, place, and time.   Skin: Skin is warm and dry.   Psychiatric: She has a normal mood and affect.         ED Course   Procedures  Labs Reviewed   CBC W/ AUTO DIFFERENTIAL - Abnormal; Notable for the following components:       Result Value    WBC 14.73 (*)     Hemoglobin 11.8 (*)     Hematocrit 35.5 (*)     RDW 14.6 (*)     Platelets 443 (*)     Immature Grans (Abs) 0.07 (*)     Lymph # 6.6 (*)     Mono # 1.3 (*)     All other components within normal limits   COMPREHENSIVE METABOLIC PANEL   LIPASE   TROPONIN I   URINALYSIS, REFLEX TO URINE CULTURE    Narrative:     Specimen Source->Urine   URINALYSIS MICROSCOPIC    Narrative:     Specimen Source->Urine     EKG Readings: (Independently Interpreted)   Initial Reading: No STEMI. Rhythm: Normal Sinus Rhythm. Heart Rate: 93. Clinical Impression: Normal Sinus Rhythm       Imaging Results          US Carotid Bilateral (In process)  Result time 11/24/20 16:41:30                    Additional MDM:   Heart Score:    History:          Slightly suspicious.  ECG:              Normal  Age:               45-65 years  Risk factors: >= 3 risk factors or history of atherosclerotic disease  Troponin:       Less than or equal to normal limit  Final Score: 3             Scribe Attestation:   Scribe #1: I performed the above scribed service and the documentation accurately describes the services I performed. I attest to the accuracy of the note.      Initial assessment:  Pt is a 49 yo obese female with a hx of DVT, DM2, HLP, PE, CAD, HTN who presents with right upper quad abd pain that has never resolved after cholecystectomy on sept 8, 2020.  She also reports right sternal chest pain that radiates to the neck and head.  She reports that 2d ago she had frontal headache but it has resolved spontaneously.      On PE, pt is afebrile nontoxic and in nad.  Abd is soft and nontender x4 quads.  CArd: tachy with no mcr, BBS CTA.  Bilat legs with 2+ edema pitting, both feet in walking shoes, when removed left foot in football dressing.  Left foot with chronic wound with no signs of infection.    Ddx: choledocolithiasis, dvt/pe, acs/mi, carotid dissection, carotid thrombus formation, cva, tia, sah, hepatitis, gastritis, wound infection, sepsis    Plan: labs, imaging, meds for pain and to palliate pt's symptoms.         ED Course as of Nov 24 1642   Tue Nov 24, 2020   1326 BP(!)(S): 199/89 [VC]   1326 Temp: 98.2 °F (36.8 °C) [VC]   1326 Temp src: Oral [VC]   1326 Pulse: 93 [VC]   1326 Resp: 18 [VC]   1326 SpO2: 98 % [VC]   1444 Resp: 18 [VC]   1452 Neg for infection.   Urinalysis Microscopic [VC]   1504 CBC: leukocyte count was increased, the H&H was reduced. The platelet count was increased.   This is baseline for this patient.     CBC Auto Differential(!) [VC]   1525 The chemistry was negative for hypo-or hyper natremia, kalemia, chloridemia, or other electrolyte abnormalities; BUN and creatinine were within normal limits indicating normal kidney function, ALT and AST were within normal limits indicating  normal liver function, there was no transaminitis.       Comprehensive Metabolic Panel [VC]   1525 Lipase: 41 [VC]   1532 Troponin I: 0.007 [VC]   1543 Awaiting imaging    [VC]   1615 SBAR given to LEVON Sanchez MD, my care ends now.    [VC]      ED Course User Index  [VC] Zain Campos DNP     Care of the patient discussed with Dr. Sanchez  who agreed with the assessment and plan.        Clinical Impression:       ICD-10-CM ICD-9-CM   1. Throbbing headache  R51.9 784.0   2. Chest pain  R07.9 786.50                                PADDY TREADWELL DNP APRN ACNP-BC FNP-C ENP-C , personally performed the services described in this documentation. All medical record entries made by the scribe were at my direction and in my presence. I have reviewed the chart and agree that the record reflects my personal performance and is accurate and complete.               Zain Campos DNP  11/24/20 0274

## 2020-11-24 NOTE — ED TRIAGE NOTES
"Pt presents to the ED via personal transportation reporting she had her gallbladder taken out back in September of this year but pt reports she has been having intermittent abdominal pain that has not gone away since then. Pt is also reporting right sided chest pain that does not radiate described as "cramping." Pt denies any SOB, urinary or bowel issues, fevers, body aches, or chills. Pt in NAD.  "

## 2020-11-25 ENCOUNTER — OFFICE VISIT (OUTPATIENT)
Dept: PODIATRY | Facility: CLINIC | Age: 50
End: 2020-11-25
Payer: MEDICAID

## 2020-11-25 VITALS
WEIGHT: 230 LBS | SYSTOLIC BLOOD PRESSURE: 136 MMHG | DIASTOLIC BLOOD PRESSURE: 87 MMHG | BODY MASS INDEX: 39.27 KG/M2 | HEIGHT: 64 IN

## 2020-11-25 DIAGNOSIS — L97.422 LEFT MIDFOOT ULCER, WITH FAT LAYER EXPOSED: ICD-10-CM

## 2020-11-25 DIAGNOSIS — E11.49 TYPE II DIABETES MELLITUS WITH NEUROLOGICAL MANIFESTATIONS: Primary | ICD-10-CM

## 2020-11-25 PROCEDURE — 99213 OFFICE O/P EST LOW 20 MIN: CPT | Mod: PBBFAC,PO | Performed by: PODIATRIST

## 2020-11-25 PROCEDURE — 99999 PR PBB SHADOW E&M-EST. PATIENT-LVL III: CPT | Mod: PBBFAC,,, | Performed by: PODIATRIST

## 2020-11-25 PROCEDURE — 99999 PR PBB SHADOW E&M-EST. PATIENT-LVL III: ICD-10-PCS | Mod: PBBFAC,,, | Performed by: PODIATRIST

## 2020-11-25 PROCEDURE — 99213 PR OFFICE/OUTPT VISIT, EST, LEVL III, 20-29 MIN: ICD-10-PCS | Mod: S$PBB,,, | Performed by: PODIATRIST

## 2020-11-25 PROCEDURE — 99213 OFFICE O/P EST LOW 20 MIN: CPT | Mod: S$PBB,,, | Performed by: PODIATRIST

## 2020-11-25 NOTE — ED PROVIDER NOTES
Pt here for evaluation of chest/abd discomfort. Labs/imaging/note reviewed    Labs Reviewed   CBC W/ AUTO DIFFERENTIAL - Abnormal; Notable for the following components:       Result Value    WBC 14.73 (*)     Hemoglobin 11.8 (*)     Hematocrit 35.5 (*)     RDW 14.6 (*)     Platelets 443 (*)     Immature Grans (Abs) 0.07 (*)     Lymph # 6.6 (*)     Mono # 1.3 (*)     All other components within normal limits   COMPREHENSIVE METABOLIC PANEL   LIPASE   TROPONIN I   URINALYSIS, REFLEX TO URINE CULTURE    Narrative:     Specimen Source->Urine   URINALYSIS MICROSCOPIC    Narrative:     Specimen Source->Urine       CT Abdomen Pelvis With Contrast   Final Result      No evidence of filling defects to suggest an acute pulmonary embolism.  Decrease conspicuity of a previously seen subcentimeter right lower lobe pulmonary nodule.      Hepatomegaly.  Hepatic steatosis.      Mediastinal adenopathy.  Mildly enlarged lymph node in the left upper quadrant.  Subcentimeter retroperitoneal lymph nodes.  Non pathologically enlarged bilateral inguinal lymph nodes.  Findings may represent reactive adenopathy, lymphoma, or other etiology.      Right paramedian ventral fat containing hernias.      Colonic diverticulosis.         Electronically signed by: Halle Gruber   Date:    11/24/2020   Time:    18:01      CTA Chest Non-Coronary (PE Study)   Final Result      No evidence of filling defects to suggest an acute pulmonary embolism.  Decrease conspicuity of a previously seen subcentimeter right lower lobe pulmonary nodule.      Hepatomegaly.  Hepatic steatosis.      Mediastinal adenopathy.  Mildly enlarged lymph node in the left upper quadrant.  Subcentimeter retroperitoneal lymph nodes.  Non pathologically enlarged bilateral inguinal lymph nodes.  Findings may represent reactive adenopathy, lymphoma, or other etiology.      Right paramedian ventral fat containing hernias.      Colonic diverticulosis.         Electronically signed  by: Halle Gruber   Date:    11/24/2020   Time:    18:01      US Carotid Bilateral   Final Result      No evidence of a hemodynamically significant carotid stenosis.  Less than 50% stenosis by Doppler criteria in the internal carotid arteries bilaterally.      Antegrade bilateral vertebral artery flow.         Electronically signed by: Lali Belle   Date:    11/24/2020   Time:    16:41          Pt stable for outpt f/u. Will dischage on general meds for abd pain     Betty Correa MD  11/24/20 1969

## 2020-11-25 NOTE — PROGRESS NOTES
Subjective:      Patient ID: Audrey Natarajan is a 50 y.o. female.    Chief Complaint: Diabetes Mellitus (PCP  10/26/2020) and Wound Check    Audrey Natarajan is a 50 y.o. female with  has a past medical history of ADHD (attention deficit hyperactivity disorder), Arthritis, Asthma, Bipolar 1 disorder, Cataract, COPD (chronic obstructive pulmonary disease), Coronary artery disease, Depression, Diabetes mellitus, DVT of lower extremity, bilateral, Encounter for blood transfusion, History of blood clots, HTN (hypertension), Hypercholesteremia, Irregular heartbeat, Neuromuscular disorder, PE (pulmonary embolism), and Restless leg syndrome. presents to the podiatry clinic for care of  left foot ulcer.   Location: plantar and midfoot Onset of the symptoms was several weeks ago. Precipitating event: LLE edema and only being able to wear slide on shoes..  History of injury: no Current symptoms include: redness and swelling. Signs of infection denies nausea, vomiting, fever, chills   Symptoms have progressed to a point and plateaued. Patient has had no prior foot problems. Evaluation to date: none. Treatment to date: neosporin. Patients rates pain 6/10 on pain scale.    11/18/2020 Audrey Natarajan is a 50 y.o. female returns to clinic for follow up of  left foot ulcers.  Patient has left football dressing clean, dry, intact.  Patient denies pain. No new pedal complaints. She has been taking antibiotics as prescribed without known adverse reaction    11/24/2020 patient returns to clinic for follow-up of left foot ulcer.  Patient does live football dressing clean, dry, intact until she went to the ED for abdominal pain and her dressing was removed.  She denies pain secondary to neuropathy.  No new pedal complaints.  She has completed antibiotics as prescribed.    Chief Complaint   Patient presents with    Diabetes Mellitus     PCP  10/26/2020    Wound Check       Hemoglobin A1C   Date Value Ref  Range Status   09/29/2020 7.3 (H) 4.0 - 5.6 % Final     Comment:     ADA Screening Guidelines:  5.7-6.4%  Consistent with prediabetes  >or=6.5%  Consistent with diabetes  High levels of fetal hemoglobin interfere with the HbA1C  assay. Heterozygous hemoglobin variants (HbS, HgC, etc)do  not significantly interfere with this assay.   However, presence of multiple variants may affect accuracy.     09/29/2020 7.3 (H) 4.0 - 5.6 % Final     Comment:     ADA Screening Guidelines:  5.7-6.4%  Consistent with prediabetes  >or=6.5%  Consistent with diabetes  High levels of fetal hemoglobin interfere with the HbA1C  assay. Heterozygous hemoglobin variants (HbS, HgC, etc)do  not significantly interfere with this assay.   However, presence of multiple variants may affect accuracy.     06/16/2020 7.6 (H) 4.0 - 5.6 % Final     Comment:     ADA Screening Guidelines:  5.7-6.4%  Consistent with prediabetes  >or=6.5%  Consistent with diabetes  High levels of fetal hemoglobin interfere with the HbA1C  assay. Heterozygous hemoglobin variants (HbS, HgC, etc)do  not significantly interfere with this assay.   However, presence of multiple variants may affect accuracy.               Patient Active Problem List   Diagnosis    Essential hypertension    COPD (chronic obstructive pulmonary disease)    Hypertriglyceridemia    Tobacco abuse    Mild protein malnutrition    Diabetic neuropathy    Leg swelling    Incisional hernia without mention of obstruction or gangrene    DM type 2 without retinopathy    History of DVT (deep vein thrombosis)    History of pulmonary embolus (PE)    Bipolar 1 disorder    Gastroparesis due to DM    Thrombocytosis    Leukocytosis    Morbid obesity    Type 2 diabetes mellitus    Acne    Other chronic pain    Vomiting and diarrhea    Nuclear sclerosis of both eyes    Bilateral ocular hypertension    Refractive error    Long term (current) use of anticoagulants    Hypercoagulable state     History of pulmonary embolism    DVT, recurrent, lower extremity, chronic, left    Decreased ROM of ankle    Decreased strength of lower extremity    Balance problem    Gait abnormality    Calculus of gallbladder without cholecystitis without obstruction    Cholelithiasis    RUQ pain    Right upper quadrant abdominal pain    Leucocytosis    Fatty liver    Hepatomegaly    History of bariatric surgery    Need for prophylactic vaccination against hepatitis A and hepatitis B    Liver fibrosis       Current Outpatient Medications on File Prior to Visit   Medication Sig Dispense Refill    acetaminophen (ARTHRITIS PAIN RELIEVER) 650 MG TbSR Take 650 mg by mouth. Pt states taking 2 -3 times a day      albuterol (ACCUNEB) 0.63 mg/3 mL Nebu Take 3 mLs (0.63 mg total) by nebulization every 8 (eight) hours as needed (wheezing). Rescue 1 Box 1    albuterol (PROAIR HFA) 90 mcg/actuation inhaler INHALE 2 PUFFS BY MOUTH INTO THE LUNGS EVERY 6 HOURS AS NEEDED FOR WHEEZING. RESCUE 8.5 g 1    aspirin (ECOTRIN) 81 MG EC tablet Take 81 mg by mouth once daily.       azelastine (ASTELIN) 137 mcg (0.1 %) nasal spray 1 spray (137 mcg total) by Nasal route 2 (two) times daily. 30 mL 0    b complex vitamins tablet Take 1 tablet by mouth once daily. 90 tablet 2    carbamazepine (TEGRETOL) 200 mg tablet TK 2 TS PO BID  1    ciclopirox (LOPROX) 0.77 % Susp Apply topically once daily. 30 mL 3    ciclopirox-nail lacquer removr 8 % Kit Apply 1 application topically once a week. 1 kit 4    clotrimazole (LOTRIMIN) 1 % cream Apply topically 2 (two) times daily. 28 g 1    cyanocobalamin (VITAMIN B-12) 100 MCG tablet Take 1 tablet (100 mcg total) by mouth once daily. 90 tablet 1    dicyclomine (BENTYL) 20 mg tablet Take 1 tablet (20 mg total) by mouth 2 (two) times daily. 20 tablet 0    diphenhydrAMINE (BENADRYL) 50 MG capsule Take 1 capsule (50 mg total) by mouth every 6 (six) hours as needed for Itching or Allergies  (Swelling of the throat, rash and shortness of breath). 20 capsule 0    doxycycline (VIBRA-TABS) 100 MG tablet Take 1 tablet (100 mg total) by mouth 2 (two) times daily. 20 tablet 0    DULERA 100-5 mcg/actuation HFAA INHALE 2 PUFFS INTO THE LUNGS TWICE DAILY 13 g 2    DUPIXENT 300 mg/2 mL Syrg inject 300mg SUBCUTANEOUSLY every other week  6    famotidine (PEPCID) 20 MG tablet Take 1 tablet (20 mg total) by mouth 2 (two) times daily. 60 tablet 0    fluticasone propionate (FLONASE) 50 mcg/actuation nasal spray SHAKE LIQUID AND USE 1 SPRAY(50 MCG) IN EACH NOSTRIL TWICE DAILY 16 g 3    furosemide (LASIX) 20 MG tablet TAKE 1 TABLET(20 MG) BY MOUTH EVERY DAY 90 tablet 2    gabapentin (NEURONTIN) 600 MG tablet TAKE 1 TABLET(600 MG) BY MOUTH TWICE DAILY 180 tablet 2    hydrOXYzine pamoate (VISTARIL) 25 MG Cap       lancets Misc To check BG once daily, to use with insurance preferred meter 30 each 2    lisinopriL (PRINIVIL,ZESTRIL) 5 MG tablet TAKE 1 TABLET(5 MG) BY MOUTH EVERY DAY 90 tablet 2    loratadine (CLARITIN) 10 mg tablet Take 1 tablet (10 mg total) by mouth once daily. 30 tablet 5    meloxicam (MOBIC) 15 MG tablet Take 1 tablet (15 mg total) by mouth once daily. 30 tablet 2    metFORMIN (GLUCOPHAGE) 1000 MG tablet TAKE 1 TABLET(1000 MG) BY MOUTH TWICE DAILY WITH MEALS 180 tablet 2    metoprolol tartrate (LOPRESSOR) 50 MG tablet Take 1 tablet (50 mg total) by mouth 2 (two) times daily. 60 tablet 2    mometasone-formoterol (DULERA) 200-5 mcg/actuation inhaler Inhale 1 puff into the lungs 2 (two) times daily. 8.8 g 1    multivitamin (THERAGRAN) per tablet Take 1 tablet by mouth every morning. 90 tablet 2    nicotine (NICODERM CQ) 21 mg/24 hr Place 1 patch onto the skin once daily. 28 patch 0    nicotine polacrilex 2 MG Lozg 1-2 per hour in place of a cigarette. Limit to 10 a day - oral. 144 lozenge 0    NYSTOP powder APPLY TO AFFECTED AREA TWICE DAILY 60 g 2    omega-3 fatty acids/fish oil (FISH  "OIL-OMEGA-3 FATTY ACIDS) 300-1,000 mg capsule Take 1 capsule by mouth once daily.      pantoprazole (PROTONIX) 40 MG tablet TAKE 1 TABLET(40 MG) BY MOUTH EVERY DAY 30 tablet 5    pravastatin (PRAVACHOL) 40 MG tablet TAKE 1 TABLET(40 MG) BY MOUTH EVERY DAY 90 tablet 2    risperidone (RISPERDAL) 3 MG Tab take at bedtime  1    simethicone (MYLICON) 125 mg Cap capsule Take 1 capsule (125 mg total) by mouth 4 (four) times daily as needed. 30 capsule 0    VYVANSE 50 mg capsule TK ONE C PO QAM  0    blood-glucose meter kit To check BG once daily, to use with insurance preferred meter 1 each 0     Current Facility-Administered Medications on File Prior to Visit   Medication Dose Route Frequency Provider Last Rate Last Dose    [COMPLETED] hydromorphone (PF) injection 0.5 mg  0.5 mg Intravenous ED 1 Time Shaggy Sanchez MD   0.5 mg at 11/24/20 1624    [COMPLETED] iohexoL (OMNIPAQUE 350) injection 100 mL  100 mL Intravenous ONCE PRN Shaggy Sanchez MD   100 mL at 11/24/20 1655    [DISCONTINUED] hydromorphone (PF) injection 0.5 mg  0.5 mg Intravenous ED 1 Time Zain Campos DNP           Review of patient's allergies indicates:   Allergen Reactions    Morphine Other (See Comments)     Patient had a psychotic episode after taking Morphine  Agitation, hallucinations    Penicillins Anaphylaxis     itching    Januvia [sitagliptin] Hives       Past Surgical History:   Procedure Laterality Date    ABDOMINAL SURGERY      BILATERAL OOPHORECTOMY Bilateral 1/12/2015    gastric sleeve  2016    Green' s filter Right 7/4/2012    Right Neck & Tunneled Down.    HERNIA REPAIR      "Diana of Hernias Repaires around th Belly Button.", pt. states    LAPAROSCOPIC CHOLECYSTECTOMY N/A 9/10/2020    Procedure: CHOLECYSTECTOMY, LAPAROSCOPIC;  Surgeon: Montrell Gutierrez MD;  Location[facility];  Service: General;  Laterality: N/A;  RN PREOP 9/9----COVID Negative  9/9    OOPHORECTOMY      OVARIAN CYST REMOVAL  3/13/2014    KS " REMOVAL OF OVARY/TUBE(S)      SPLENECTOMY, TOTAL  2003    TONSILLECTOMY      as a child    TYMPANOSTOMY TUBE PLACEMENT  1976    VEIN SURGERY      Lt leg       Family History   Problem Relation Age of Onset    Hypertension Father     Diabetes Father     Heart disease Father     Cataracts Father     Diabetes Paternal Grandfather     Heart disease Paternal Grandfather     No Known Problems Mother     Ovarian cancer Maternal Grandmother          from this. ? age     No Known Problems Sister     No Known Problems Brother     No Known Problems Maternal Aunt     No Known Problems Maternal Uncle     No Known Problems Paternal Aunt     No Known Problems Paternal Uncle     No Known Problems Maternal Grandfather     Ovarian cancer Paternal Grandmother     Uterine cancer Neg Hx     Breast cancer Neg Hx     Colon cancer Neg Hx     Amblyopia Neg Hx     Blindness Neg Hx     Cancer Neg Hx     Glaucoma Neg Hx     Macular degeneration Neg Hx     Retinal detachment Neg Hx     Strabismus Neg Hx     Stroke Neg Hx     Thyroid disease Neg Hx        Social History     Socioeconomic History    Marital status: Significant Other     Spouse name: Not on file    Number of children: Not on file    Years of education: Not on file    Highest education level: Not on file   Occupational History    Not on file   Social Needs    Financial resource strain: Not on file    Food insecurity     Worry: Not on file     Inability: Not on file    Transportation needs     Medical: Not on file     Non-medical: Not on file   Tobacco Use    Smoking status: Current Every Day Smoker     Packs/day: 0.50     Years: 25.00     Pack years: 12.50     Types: Cigarettes    Smokeless tobacco: Never Used   Substance and Sexual Activity    Alcohol use: No     Alcohol/week: 0.0 standard drinks    Drug use: No    Sexual activity: Yes     Partners: Male   Lifestyle    Physical activity     Days per week: Not on file      "Minutes per session: Not on file    Stress: Not on file   Relationships    Social connections     Talks on phone: Not on file     Gets together: Not on file     Attends Judaism service: Not on file     Active member of club or organization: Not on file     Attends meetings of clubs or organizations: Not on file     Relationship status: Not on file   Other Topics Concern    Not on file   Social History Narrative    Not on file       Review of Systems   Constitution: Negative for chills and fever.   Cardiovascular: Positive for leg swelling. Negative for claudication.   Respiratory: Negative for cough and shortness of breath.    Skin: Positive for dry skin and nail changes. Negative for itching and rash.   Musculoskeletal: Positive for arthritis, back pain, falls, joint pain and myalgias. Negative for joint swelling and muscle weakness.   Gastrointestinal: Negative for diarrhea, nausea and vomiting.   Neurological: Positive for numbness and paresthesias. Negative for tremors and weakness.   Psychiatric/Behavioral: Negative for altered mental status and hallucinations.           Objective:      Vitals:    11/25/20 1425   BP: 136/87   Weight: 104.3 kg (230 lb)   Height: 5' 4" (1.626 m)   PainSc: 0-No pain       Physical Exam  Nursing note reviewed.   Constitutional:       General: She is not in acute distress.     Appearance: She is not toxic-appearing or diaphoretic.   Cardiovascular:      Pulses:           Dorsalis pedis pulses are 2+ on the right side and 2+ on the left side.        Posterior tibial pulses are 2+ on the right side and 2+ on the left side.   Pulmonary:      Effort: No respiratory distress.   Musculoskeletal:      Right ankle: She exhibits decreased range of motion and swelling. No tenderness. No lateral malleolus, no medial malleolus, no AITFL, no CF ligament and no posterior TFL tenderness found. Achilles tendon exhibits no pain, no defect and normal Paniagua's test results.      Left ankle: " She exhibits decreased range of motion and swelling. Tenderness (anterior shin pain). No lateral malleolus, no medial malleolus, no AITFL, no CF ligament, no posterior TFL and no proximal fibula tenderness found. Achilles tendon exhibits no pain, no defect and normal Paniagua's test results.      Right foot: No bony tenderness.      Left foot: Swelling present.      Comments: Biomechanical exam: There is equinus deformity bilateral with decreased dorsiflexion at the ankle joint bilateral. No tenderness with compression of heel. Negative tinels sign. Gait analysis reveals excessive pronation through midstance and propulsion with early heel off. Shoes reveals lateral heel counter wear bilateral     Decreased first MPJ range of motion both weightbearing and nonweightbearing, no crepitus observed the first MP joint, + dorsal flag sign. Mild  bunion deformity is observed .    Patient has hammertoes of digits 2-5 bilateral partially reducible without symptom today.     Skin:     General: Skin is warm and dry.      Coloration: Skin is not pale.      Findings: Wound present. No bruising, burn, laceration or rash.      Nails: There is no clubbing.        Comments: Ulcer location: sub 1st MTPJ, left  Measurements :0.1x0.1x0.1  cm   Signs of infection: local edema   Drainage: Sanguinous  Purulence: no  Crepitus/fluctuance: no  Periwound: Reddened, Macerated, Calloused  Base: Granular/ thin epithelial tissue    Toenails 1-5 bilaterally are thickened by 2-3 mm, discolored/yellowed, dystrophic, brittle with subungual debris.    Neurological:      Sensory: No sensory deficit.      Motor: No tremor, atrophy or abnormal muscle tone.      Deep Tendon Reflexes: Reflexes are normal and symmetric.      Comments: Paresthesias, and hyperesthesia bilateral feet at toes with no clearly identified trigger or source.    New Holstein-Gilberto 5.07 monofilament is intact bilateral feet. Sharp/dull sensation is also intact Bilateral feet.    Psychiatric:         Attention and Perception: She is attentive.         Mood and Affect: Mood is not anxious. Affect is not inappropriate.         Speech: She is communicative. Speech is not slurred.         Behavior: Behavior is not combative.       11/24/2020 11/18/2020 11/11/2020                  Assessment:       Encounter Diagnoses   Name Primary?    Type II diabetes mellitus with neurological manifestations Yes    Left midfoot ulcer, with fat layer exposed          Plan:     Problem List Items Addressed This Visit     None      Visit Diagnoses     Type II diabetes mellitus with neurological manifestations    -  Primary    Left midfoot ulcer, with fat layer exposed                  I counseled the patient on her conditions, their implications and medical management.      Greater than 50% of this visit spent on counseling and coordination of care.    Education about the diabetic foot, neuropathy, and prevention of limb loss.    Discussed wound healing cycle, skin integrity, ways to care for skin.Counseled patient on the effects of smoking and  high blood glucose on healing. She verbalizes understanding that it can increase the chances of delayed healing and this prolonged exposure leads to infection or progression of infection which subsequently can result in loss of limb.    Adequate vitamin supplementation, protein intake, and hydration - discussed with patient    The wound is cleansed of foreign material as much as possible and the base inspected for bone or abscess.     Continued improvement noted    Dressings: mepilex  Offloading: football    Follow-up: 1 week but should call [facility] immediately if any signs of infection, such as fever, chills, sweats, increased redness or pain.    Short-term goals include maintaining good offloading and minimizing bioburden, promoting granulation and epithelialization to healing.  Long-term goals include keeping the wound healed by good offloading  and medical management under the direction of internist.    Shoe inspection. Diabetic Foot Education. Patient reminded of the importance of good nutrition and blood sugar control to help prevent podiatric complications of diabetes. Patient instructed on proper foot hygeine. We discussed wearing proper shoe gear, daily foot inspections, never walking without protective shoe gear, never putting sharp instruments to feet.          Procedures

## 2020-11-25 NOTE — DISCHARGE INSTRUCTIONS
Thank you for coming to our Emergency Department today. It is important to remember that some problems are difficult to diagnose and may not be found during your first visit. Be sure to follow up with your primary care doctor and review any labs/imaging that was performed with them. If you do not have a primary care doctor, you may contact the one listed on your discharge paperwork or you may also call the [facility] Appointment Desk at [phone] to schedule an appointment with one.     All medications may potentially have side effects and it is impossible to predict which medications may give you side effects. If you feel that you are having a negative effect of any medication you should immediately stop taking them and seek medical attention.    Return to the ER with any questions/concerns, new/concerning symptoms, worsening or failure to improve. Do not drive or make any important decisions for 24 hours if you have received any pain medications, sedatives or mood altering drugs during your ER visit.

## 2020-11-27 ENCOUNTER — HOSPITAL ENCOUNTER (OUTPATIENT)
Dept: CARDIOLOGY | Facility: HOSPITAL | Age: 50
Discharge: HOME | End: 2020-11-27
Attending: SURGERY
Payer: MEDICAID

## 2020-11-27 PROCEDURE — 93970 EXTREMITY STUDY: CPT | Mod: 26,,, | Performed by: SURGERY

## 2020-11-27 PROCEDURE — 93970 CV US LOWER VENOUS INSUFFICIENCY BILATERAL (CUPID ONLY): ICD-10-PCS | Mod: 26,,, | Performed by: SURGERY

## 2020-11-27 PROCEDURE — 93970 EXTREMITY STUDY: CPT | Mod: TC

## 2020-11-30 ENCOUNTER — CLINICAL SUPPORT (OUTPATIENT)
Dept: SMOKING CESSATION | Facility: CLINIC | Age: 50
End: 2020-11-30
Payer: COMMERCIAL

## 2020-11-30 DIAGNOSIS — F17.200 NICOTINE DEPENDENCE: ICD-10-CM

## 2020-11-30 PROCEDURE — 99402 PREV MED CNSL INDIV APPRX 30: CPT | Mod: S$GLB,,,

## 2020-11-30 PROCEDURE — 99999 PR PBB SHADOW E&M-EST. PATIENT-LVL I: CPT | Mod: PBBFAC,,,

## 2020-11-30 PROCEDURE — 99402 PR PREVENT COUNSEL,INDIV,30 MIN: ICD-10-PCS | Mod: S$GLB,,,

## 2020-11-30 PROCEDURE — 99999 PR PBB SHADOW E&M-EST. PATIENT-LVL I: ICD-10-PCS | Mod: PBBFAC,,,

## 2020-11-30 RX ORDER — IBUPROFEN 200 MG
1 TABLET ORAL DAILY
Qty: 28 PATCH | Refills: 0 | Status: SHIPPED | OUTPATIENT
Start: 2020-11-30 | End: 2020-12-30

## 2020-11-30 NOTE — PROGRESS NOTES
Individual Follow-Up Form    11/30/2020    Quit Date: tbd    Clinical Status of Patient: Outpatient    Length of Service: 30 minutes    Continuing Medication: yes  Patches or Nicotine Lozenges    Other Medications: none     Target Symptoms: Withdrawal and medication side effects. The following were  rated moderate (3) to severe (4) on TCRS:  · Moderate (3): none  · Severe (4): none    Comments: Telephonic visit due to Covid 19 pandemic.     Patient continues to smoke 4 cigarettes per day. Pt remains on tobacco cessation medication of  21 mg nicotine patch QD and 2 mg nicotine lozenge  PRN (1-2 per hour in place of cigarettes.) No adverse effects noted at this time. Reviewed strategies, cues, triggers, high risk situations, lapses, relapses, diet, exercise, stress, relaxation, sleep, habitual behavior, and life style changes.      Diagnosis: F17.210    Next Visit: 1 week

## 2020-12-01 LAB
LEFT GREAT SAPHENOUS DISTAL THIGH DIA: 0.5 CM
LEFT GREAT SAPHENOUS DISTAL THIGH REFLUX: 941 MS
LEFT GREAT SAPHENOUS JUNCTION DIA: 1 CM
LEFT GREAT SAPHENOUS JUNCTION REFLUX: 2254 MS
LEFT GREAT SAPHENOUS KNEE DIA: 0.5 CM
LEFT GREAT SAPHENOUS MIDDLE THIGH DIA: 0.4 CM
LEFT GREAT SAPHENOUS PROXIMAL CALF DIA: 0.5 CM
LEFT SMALL SAPHENOUS KNEE DIA: 0.3 CM
LEFT SMALL SAPHENOUS SPJ DIA: 0.2 CM
RIGHT GREAT SAPHENOUS DISTAL THIGH DIA: 4 CM
RIGHT GREAT SAPHENOUS JUNCTION DIA: 0.6 CM
RIGHT GREAT SAPHENOUS KNEE DIA: 0.4 CM
RIGHT GREAT SAPHENOUS KNEE REFLUX: 570 MS
RIGHT GREAT SAPHENOUS MIDDLE THIGH DIA: 0.5 CM
RIGHT GREAT SAPHENOUS PROXIMAL CALF DIA: 0.3 CM
RIGHT GREAT SAPHENOUS PROXIMAL CALF REFLUX: 2820 MS
RIGHT SMALL SAPHENOUS KNEE DIA: 0.2 CM
RIGHT SMALL SAPHENOUS SPJ DIA: 0.2 CM

## 2020-12-02 ENCOUNTER — OFFICE VISIT (OUTPATIENT)
Dept: PODIATRY | Facility: CLINIC | Age: 50
End: 2020-12-02
Payer: MEDICAID

## 2020-12-02 VITALS
DIASTOLIC BLOOD PRESSURE: 80 MMHG | HEIGHT: 64 IN | WEIGHT: 229.94 LBS | BODY MASS INDEX: 39.26 KG/M2 | SYSTOLIC BLOOD PRESSURE: 132 MMHG

## 2020-12-02 DIAGNOSIS — E11.49 TYPE II DIABETES MELLITUS WITH NEUROLOGICAL MANIFESTATIONS: Primary | ICD-10-CM

## 2020-12-02 DIAGNOSIS — L97.422 LEFT MIDFOOT ULCER, WITH FAT LAYER EXPOSED: ICD-10-CM

## 2020-12-02 PROCEDURE — 99213 PR OFFICE/OUTPT VISIT, EST, LEVL III, 20-29 MIN: ICD-10-PCS | Mod: S$PBB,,, | Performed by: PODIATRIST

## 2020-12-02 PROCEDURE — 99999 PR PBB SHADOW E&M-EST. PATIENT-LVL V: ICD-10-PCS | Mod: PBBFAC,,, | Performed by: PODIATRIST

## 2020-12-02 PROCEDURE — 99213 OFFICE O/P EST LOW 20 MIN: CPT | Mod: S$PBB,,, | Performed by: PODIATRIST

## 2020-12-02 PROCEDURE — 99215 OFFICE O/P EST HI 40 MIN: CPT | Mod: PBBFAC,PO | Performed by: PODIATRIST

## 2020-12-02 PROCEDURE — 99999 PR PBB SHADOW E&M-EST. PATIENT-LVL V: CPT | Mod: PBBFAC,,, | Performed by: PODIATRIST

## 2020-12-03 ENCOUNTER — OFFICE VISIT (OUTPATIENT)
Dept: VASCULAR SURGERY | Facility: CLINIC | Age: 50
End: 2020-12-03
Payer: MEDICAID

## 2020-12-03 ENCOUNTER — PATIENT OUTREACH (OUTPATIENT)
Dept: ADMINISTRATIVE | Facility: OTHER | Age: 50
End: 2020-12-03

## 2020-12-03 VITALS
HEIGHT: 64 IN | SYSTOLIC BLOOD PRESSURE: 164 MMHG | BODY MASS INDEX: 40.16 KG/M2 | WEIGHT: 235.25 LBS | DIASTOLIC BLOOD PRESSURE: 84 MMHG

## 2020-12-03 DIAGNOSIS — I87.2 VENOUS INSUFFICIENCY OF BOTH LOWER EXTREMITIES: Primary | ICD-10-CM

## 2020-12-03 DIAGNOSIS — I87.303 VENOUS HYPERTENSION OF BOTH LOWER EXTREMITIES: ICD-10-CM

## 2020-12-03 PROCEDURE — 99214 OFFICE O/P EST MOD 30 MIN: CPT | Mod: S$PBB,,, | Performed by: SURGERY

## 2020-12-03 PROCEDURE — 99214 PR OFFICE/OUTPT VISIT, EST, LEVL IV, 30-39 MIN: ICD-10-PCS | Mod: S$PBB,,, | Performed by: SURGERY

## 2020-12-03 PROCEDURE — 99999 PR PBB SHADOW E&M-EST. PATIENT-LVL V: ICD-10-PCS | Mod: PBBFAC,,, | Performed by: SURGERY

## 2020-12-03 PROCEDURE — 99999 PR PBB SHADOW E&M-EST. PATIENT-LVL V: CPT | Mod: PBBFAC,,, | Performed by: SURGERY

## 2020-12-03 PROCEDURE — 99215 OFFICE O/P EST HI 40 MIN: CPT | Mod: PBBFAC | Performed by: SURGERY

## 2020-12-03 NOTE — LETTER
December 3, 2020        Rhona Botello             [facility]  [address]  [place] [address]  Phone: [phone]  Fax: [phone]   Patient: Audrey Natarajan   MR Number: [id number]   YOB: 1970   Date of Visit: 12/3/2020       Dear Dr. Botello:    Thank you for referring Audrey Natarajan to me for evaluation. Below are the relevant portions of my assessment and plan of care.            If you have questions, please do not hesitate to call me. I look forward to following Audrey along with you.    Sincerely,      Tone Mata MD           CC  No Recipients

## 2020-12-03 NOTE — PATIENT INSTRUCTIONS
Putting on Compression Stockings     Turn the stocking inside-out, then fit it over your toes and heel.          Roll the stocking up your leg.            Once stockings are on, make sure the top of the stocking is about two fingers width below the crease of the knee (or the groin if you wear thigh-high stockings).          Use equipment, such as a stocking melisa, or wear rubber gloves to make it easier to put on compression stockings.         Elastic compression stockings are prescribed to treat many vein problems. Wearing them may be the most important thing you do to manage your symptoms. The stockings fit tightly around your ankle, gradually reducing in pressure as they go up your legs. This helps keep blood flowing to your heart. As a result, swelling is reduced. Your healthcare provider will prescribe stockings at a safe pressure for you. He or she will also tell you how often to wear and remove the stockings. Follow these instructions closely. Also, do not buy or wear compression stockings without first seeing your healthcare provider.  Tips for wear and care  To wear stockings safely and to get the most benefit:  · Wear the length prescribed by your healthcare provider.  · Pull them to the designated height and no farther. Dont let them bunch at the top. This can restrict blood flow and increase swelling.  · Wear the stockings for the amount of time your healthcare provider recommends. Replace them when they start to feel loose. This will likely be every 3 to 6 months.  · Remove them as your healthcare provider directs. When removed, wash your legs. Then check your legs and feet for sores. Call your healthcare provider if you find a sore. Dont put the stockings back on unless your healthcare provider directs.   · Wash the stockings as instructed. They may need to be hand-washed.  Date Last Reviewed: 5/1/2016  © 6727-2993 [employer]. [address] [place] [address], [place] [address]. All rights  reserved. This information is not intended as a substitute for professional medical care. Always follow your healthcare professional's instructions.        Understanding Chronic Venous Insufficiency  Problems with the veins in the legs may lead to chronic venous insufficiency (CVI). CVI means that there is a long-term problem with the veins not being able to pump blood back to your heart. When this happens, blood stays in the legs and causes swelling and aching.   Two problems that may lead to chronic venous insufficiency are:  · Damaged valves. Valves keep blood flowing from the legs through the blood vessels and back to the heart. When the valves are damaged, blood does not flow as well.   · Deep vein thrombosis (DVT). Blood clots may form in the deep veins of the legs. This may cause pain, redness, and swelling in the legs. It may also block the flow of blood back to the heart. Seek immediate medical care if you have these symptoms.  · A blood clot in the leg can also break off and travel to the lungs. This is called pulmonary embolism (PE). In the lungs, the clot can cut off the flow of blood. This may cause chest pain, trouble breathing, sweating, a fast heartbeat, coughing (may cough up blood), and fainting. It is a medical emergency and may cause death. Call 911 if you have these symptoms.  · Healthcare providers call the two conditions, DVT and PE, venous thromboembolism (VTE).  CVI cant be cured, but you can control leg swelling to reduce the likelihood of ulcers (sores).  Recognizing the symptoms  Be aware of the following:  · If you stand or sit with your feet down for long periods, your legs may ache or feel heavy.  · Swollen ankles are possibly the most common symptom of CVI.  · As swelling increases, the skin over your ankles may show red spots or a brownish tinge. The skin may feel leathery or scaly, and may start to itch.  · If swelling is not controlled, an ulcer (open wound) may form.  What you  can do  Reduce your risk of developing ulcers by doing the following:  · Increase blood flow back to your heart by elevating your legs, exercising daily, and wearing elastic stockings.  · Boost blood flow in your legs by losing excess weight.  · If you must stand or sit in one place for a period of time, keep your blood moving by wiggling your toes, shifting your body position, and rising up on the balls of your feet.    Date Last Reviewed: 5/1/2016 © 2000-2017 [employer]. [address] [place] [address], [place] [address]. All rights reserved. This information is not intended as a substitute for professional medical care. Always follow your healthcare professional's instructions.        Tips for Using Less Salt    Most people with heart problems need to eat less salt (sodium). Reducing the amount of salt you eat may help control your blood pressure. The higher your blood pressure, the greater your risk for heart disease, stroke, blindness, and kidney problems.  At the store  · Make low-salt choices by reading labels carefully. Look for the total amount of sodium per serving.  · Use more fresh food. Buy more fruits and vegetables. Select lean meats, fish, and poultry.  · Use fewer frozen, canned, and packaged foods which often contain a lot of sodium.  · Use plain frozen vegetables without sauces or toppings. These products are often low- or no-sodium.  · Opt for reduced-sodium or no-salt-added versions of canned vegetables and soups.  In the kitchen  · Don't add salt to food when you're cooking. Season with flavorings such as onion, garlic, pepper, salt-free herbal blends, and lemon or lime juice.  · Use a cookbook containing low-salt recipes. It can give you ideas for tasty meals that are healthy for your heart.  · Sprinkle salt-free herbal blends on vegetables and meat.  · Drain and rinse canned foods, such as canned beans and vegetables, before cooking or eating.  Eating out  · Tell the  you're on a  low-salt diet. Ask questions about the menu.  · Order fish, chicken, and meat broiled, baked, poached, or grilled without salt, butter, or breading.  · Use lemon, pepper, and salt-free herb mixes to add flavor.  · Choose plain steamed rice, boiled noodles, and baked or boiled potatoes. Top potatoes with chives and a little sour cream.     Beware! Salt goes by many other names. Limit foods with these words listed as ingredients: salt, sodium, soy sauce, baking soda, baking powder, MSG, monosodium, Na (the chemical symbol for sodium). Some antacids are also high in salt.   Date Last Reviewed: 6/19/2015 © 2000-2017 [employer]. [address] [place] [address]. All rights reserved. This information is not intended as a substitute for professional medical care. Always follow your healthcare professional's instructions.        Low-Salt Diet  This diet removes foods that are high in salt. It also limits the amount of salt you use when cooking. It is most often used for people with high blood pressure, edema (fluid retention), and kidney, liver, or heart disease.  Table salt contains the mineral sodium. Your body needs sodium to work normally. But too much sodium can make your health problems worse. Your healthcare provider is recommending a low-salt (also called low-sodium) diet for you. Your total daily allowance of salt is 1,500 to 2,300 milligrams (mg). It is less than 1 teaspoon of table salt. This means you can have only about 500 to 700 mg of sodium at each meal. People with certain health problems should limit salt intake to the lower end of the recommended range.    When you cook, dont add much salt. If you can cook without using salt, even better. Dont add salt to your food at the table.  When shopping, read food labels. Salt is often called sodium on the label. Choose foods that are salt-free, low salt, or very low salt. Note that foods with reduced salt may not lower your salt intake  enough.    Beans, potatoes, and pasta  Ok: Dry beans, split peas, lentils, potatoes, rice, macaroni, pasta, spaghetti without added salt  Avoid: Potato chips, tortilla chips, and similar products  Breads and cereals  Ok: Low-sodium breads, rolls, cereals, and cakes; low-salt crackers, matzo crackers  Avoid: Salted crackers, pretzels, popcorn, Bengali toast, pancakes, muffins  Dairy  Ok: Milk, chocolate milk, hot chocolate mix, low-salt cheeses, and yogurt  Avoid: Processed cheese and cheese spreads; Roquefort, Camembert, and cottage cheese; buttermilk, instant breakfast drink  Desserts  Ok: Ice cream, frozen yogurt, juice bars, gelatin, cookies and pies, sugar, honey, jelly, hard candy  Avoid: Most pies, cakes and cookies prepared or processed with salt; instant pudding  Drinks  Ok: Tea, coffee, fizzy (carbonated) drinks, juices  Avoid: Flavored coffees, electrolyte replacement drinks, sports drinks  Meats  Ok: All fresh meat, fish, poultry, low-salt tuna, eggs, egg substitute  Avoid: Smoked, pickled, brine-cured, or salted meats and fish. This includes chavez, chipped beef, corned beef, hot dogs, deli meats, ham, kosher meats, salt pork, sausage, canned tuna, salted codfish, smoked salmon, herring, sardines, or anchovies.  Seasonings and spices  Ok: Most seasonings are okay. Good substitutes for salt include: fresh herb blends, hot sauce, lemon, garlic, wilcox, vinegar, dry mustard, parsley, cilantro, horseradish, tomato paste, regular margarine, mayonnaise, unsalted butter, cream cheese, vegetable oil, cream, low-salt salad dressing and gravy.  Avoid: Regular ketchup, relishes, pickles, soy sauce, teriyaki sauce, Worcestershire sauce, BBQ sauce, tartar sauce, meat tenderizer, chili sauce, regular gravy, regular salad dressing, salted butter  Soups  Ok: Low-salt soups and broths made with allowed foods  Avoid: Bouillon cubes, soups with smoked or salted meats, regular soup and broth  Vegetables  Ok: Most vegetables  are okay; also low-salt tomato and vegetable juices  Avoid: Sauerkraut and other brine-soaked vegetables; pickles and other pickled vegetables; tomato juice, olives  Date Last Reviewed: 8/1/2016 © 2000-2017 [employer]. [address] [place] [address]. All rights reserved. This information is not intended as a substitute for professional medical care. Always follow your healthcare professional's instructions.        Low-Salt Choices  Eating salt (sodium) can make your body retain too much water. Excess water makes your heart work harder. Canned, packaged, and frozen foods are easy to prepare, but they are often high in sodium. Here are some ideas for low-salt foods you can easily prepare yourself.    For breakfast  · Fruit or 100% fruit juice  · Whole-wheat bread or an English muffin. Compare sodium content on labels.  · Low-fat milk or yogurt  · Unsalted eggs  · Shredded wheat  · Corn tortillas  · Unsalted steamed rice  · Regular (not instant) hot cereal, made without salt  Stay away from:  · Sausage, chavez, and ham  · Flour tortillas  · Packaged muffins, pancakes, and biscuits  · Instant hot cereals  · Cottage cheese  For lunch and dinner  · Fresh fish, chicken, turkey, or meat--baked, broiled, or roasted without salt  · Dry beans, cooked without salt  · Tofu, stir-fried without salt  · Unsalted fresh fruit and vegetables, or frozen or canned fruit and vegetables with no added salt  Stay away from:  · Lunch or deli meat that is cured or smoked  · Cheese  · Tomato juice and catsup  · Canned vegetables, soups, and fish not labeled as no-salt-added or reduced sodium  · Packaged gravies and sauces  · Olives, pickles, and relish  · Bottled salad dressings  For snacks and desserts  · Yogurt  · Unsalted, air popped popcorn  · Unsalted nuts or seeds  Stay away from:  · Pies and cakes  · Packaged dessert mixes  · Pizza  · Canned and packaged puddings  · Pretzels, chips, crackers, and nuts--unless  the label says unsalted  Date Last Reviewed: 6/17/2015  © 8712-0095 [employer]. [address] [place] [address], [place] [address]. All rights reserved. This information is not intended as a substitute for professional medical care. Always follow your healthcare professional's instructions.

## 2020-12-03 NOTE — PROGRESS NOTES
"       Tone Mata MD VI                       [facility]                         12/03/2020    HPI:  Audrey Natarajan is a 50 y.o. female with   Patient Active Problem List   Diagnosis    Essential hypertension    COPD (chronic obstructive pulmonary disease)    Hypertriglyceridemia    Tobacco abuse    Mild protein malnutrition    Diabetic neuropathy    Leg swelling    Incisional hernia without mention of obstruction or gangrene    DM type 2 without retinopathy    History of DVT (deep vein thrombosis)    History of pulmonary embolus (PE)    Bipolar 1 disorder    Gastroparesis due to DM    Thrombocytosis    Leukocytosis    Morbid obesity    Type 2 diabetes mellitus    Acne    Other chronic pain    Vomiting and diarrhea    Nuclear sclerosis of both eyes    Bilateral ocular hypertension    Refractive error    Long term (current) use of anticoagulants    Hypercoagulable state    History of pulmonary embolism    DVT, recurrent, lower extremity, chronic, left    Decreased ROM of ankle    Decreased strength of lower extremity    Balance problem    Gait abnormality    Calculus of gallbladder without cholecystitis without obstruction    Cholelithiasis    RUQ pain    Right upper quadrant abdominal pain    Leucocytosis    Fatty liver    Hepatomegaly    History of bariatric surgery    Need for prophylactic vaccination against hepatitis A and hepatitis B    Liver fibrosis    being managed by PCP and specialists who is here today for evaluation of BLE pain.  9/1/19 presented to ER due to RLE cramping and LLE edema.  S/p LLE DVT 2003, unprovoked and treated with anticoagulation.  S/p PE and L femoral and PT DVT 2013 and had a [place] retrievable filter placed 2013; has had persistent pain and edema since that time.  Repeat US 9/1/19 in ER due to pain/edema showed chronic L PT.  Pt states 2013 after she injured her LLE and had a bleeding vein she had a "vein " "stripping" to the outside of her LLE.  Patient states location is BLE occurring for 6 yrs.  Associated signs and symptoms include discoloration.  Quality is sharp/throbbing and severity is 10/10 at worst, average 7/10.  Symptoms began 6 yrs ago.  Alleviating factors include elevation.  Worsening factors include dependency.  Denies claudication.      no MI  no Stroke  Tobacco use: 3 cig/day; prev 1 ppd x 35 yrs    12/2019: c/o severe LLE pain.  +compression daily.  C/o stockings being too tight.  States bilateral foot paresthesias.      3/2020:  Cont to c/o LLE pain.  Cannot tolerate compression.      5/2020:  C/o sharp LLE pain that limits her ambulating and sleep despite OTC pain medications.      8/2020:  Cont c/o LLE MSK and neuropathic pain.  +edema.  +compression/elevation > 3 months with continued decreased ability to perform ADLs and ambulation.    12/2020:  Cont to c/o LLE edema, pain despite compression and elevation > 3 mo.  Limiting her ADLs    Past Medical History:   Diagnosis Date    ADHD (attention deficit hyperactivity disorder)     Arthritis     Asthma     Bipolar 1 disorder     Cataract     COPD (chronic obstructive pulmonary disease)     Coronary artery disease     A fib    Depression     bipolar manic depresson    Diabetes mellitus     DVT of lower extremity, bilateral July 2013    bilateral LE DVT. [place] filter placed.     Encounter for blood transfusion     History of blood clots 1. Left Leg=2003; 2.Bilateral Groin=Blood Clots= 5 or 6/ 2013 & 7/2013; 3. LLL of Lung=7/2013;  4. Lt. Lower Leg=7/2013.     Pt. had 1st Blood Clot after Ojlntauucizf=[id number], & Last=2013. [place] Filter= Rt.Lateral Neck.    HTN (hypertension) 6/6/2013    Pt states that she does not have hypertension    Hypercholesteremia     Irregular heartbeat     Neuromuscular disorder     neuropathy feet    PE (pulmonary embolism) July 2013     bilat LE DVT.     Restless leg syndrome      Past Surgical " "History:   Procedure Laterality Date    ABDOMINAL SURGERY      BILATERAL OOPHORECTOMY Bilateral 2015    gastric sleeve  2016    Green' s filter Right 2012    Right Neck & Tunneled Down.    HERNIA REPAIR      "[place] of Hernias Repaires around th [place].", pt. states    LAPAROSCOPIC CHOLECYSTECTOMY N/A 9/10/2020    Procedure: CHOLECYSTECTOMY, LAPAROSCOPIC;  Surgeon: Montrell Gutierrez MD;  Location: [facility];  Service: General;  Laterality: N/A;  RN PREOP ----COVID Negative      OOPHORECTOMY      OVARIAN CYST REMOVAL  3/13/2014    TN REMOVAL OF OVARY/TUBE(S)      SPLENECTOMY, TOTAL  2003    TONSILLECTOMY      as a child    TYMPANOSTOMY TUBE PLACEMENT      VEIN SURGERY      Lt leg     Family History   Problem Relation Age of Onset    Hypertension Father     Diabetes Father     Heart disease Father     Cataracts Father     Diabetes Paternal Grandfather     Heart disease Paternal Grandfather     No Known Problems Mother     Ovarian cancer Maternal Grandmother          from this. ? age     No Known Problems Sister     No Known Problems Brother     No Known Problems Maternal Aunt     No Known Problems Maternal Uncle     No Known Problems Paternal Aunt     No Known Problems Paternal Uncle     No Known Problems Maternal Grandfather     Ovarian cancer Paternal Grandmother     Uterine cancer Neg Hx     Breast cancer Neg Hx     Colon cancer Neg Hx     Amblyopia Neg Hx     Blindness Neg Hx     Cancer Neg Hx     Glaucoma Neg Hx     Macular degeneration Neg Hx     Retinal detachment Neg Hx     Strabismus Neg Hx     Stroke Neg Hx     Thyroid disease Neg Hx      Social History     Socioeconomic History    Marital status: Significant Other     Spouse name: Not on file    Number of children: Not on file    Years of education: Not on file    Highest education level: Not on file   Occupational History    Not on file   Social Needs    Financial resource " strain: Not on file    Food insecurity     Worry: Not on file     Inability: Not on file    Transportation needs     Medical: Not on file     Non-medical: Not on file   Tobacco Use    Smoking status: Current Every Day Smoker     Packs/day: 0.50     Years: 25.00     Pack years: 12.50     Types: Cigarettes    Smokeless tobacco: Never Used   Substance and Sexual Activity    Alcohol use: No     Alcohol/week: 0.0 standard drinks    Drug use: No    Sexual activity: Yes     Partners: Male   Lifestyle    Physical activity     Days per week: Not on file     Minutes per session: Not on file    Stress: Not on file   Relationships    Social connections     Talks on phone: Not on file     Gets together: Not on file     Attends [employer]ical service: Not on file     Active member of club or organization: Not on file     Attends meetings of clubs or organizations: Not on file     Relationship status: Not on file   Other Topics Concern    Not on file   Social History Narrative    Not on file       Current Outpatient Medications:     acetaminophen (ARTHRITIS PAIN RELIEVER) 650 MG TbSR, Take 650 mg by mouth. Pt states taking 2 -3 times a day, Disp: , Rfl:     aspirin (ECOTRIN) 81 MG EC tablet, Take 81 mg by mouth once daily. , Disp: , Rfl:     azelastine (ASTELIN) 137 mcg (0.1 %) nasal spray, 1 spray (137 mcg total) by Nasal route 2 (two) times daily., Disp: 30 mL, Rfl: 0    b complex vitamins tablet, Take 1 tablet by mouth once daily., Disp: 90 tablet, Rfl: 2    carbamazepine (TEGRETOL) 200 mg tablet, TK 2 TS PO BID, Disp: , Rfl: 1    ciclopirox (LOPROX) 0.77 % Susp, Apply topically once daily., Disp: 30 mL, Rfl: 3    ciclopirox-nail lacquer removr 8 % Kit, Apply 1 application topically once a week., Disp: 1 kit, Rfl: 4    cyanocobalamin (VITAMIN B-12) 100 MCG tablet, Take 1 tablet (100 mcg total) by mouth once daily., Disp: 90 tablet, Rfl: 1    dicyclomine (BENTYL) 20 mg tablet, Take 1 tablet (20 mg total) by mouth  2 (two) times daily., Disp: 20 tablet, Rfl: 0    diphenhydrAMINE (BENADRYL) 50 MG capsule, Take 1 capsule (50 mg total) by mouth every 6 (six) hours as needed for Itching or Allergies (Swelling of the throat, rash and shortness of breath)., Disp: 20 capsule, Rfl: 0    DULERA 100-5 mcg/actuation HFAA, INHALE 2 PUFFS INTO THE LUNGS TWICE DAILY, Disp: 13 g, Rfl: 2    DUPIXENT 300 mg/2 mL Syrg, inject 300mg SUBCUTANEOUSLY every other week, Disp: , Rfl: 6    famotidine (PEPCID) 20 MG tablet, Take 1 tablet (20 mg total) by mouth 2 (two) times daily., Disp: 60 tablet, Rfl: 0    fluticasone propionate (FLONASE) 50 mcg/actuation nasal spray, SHAKE LIQUID AND USE 1 SPRAY(50 MCG) IN EACH NOSTRIL TWICE DAILY, Disp: 16 g, Rfl: 3    furosemide (LASIX) 20 MG tablet, TAKE 1 TABLET(20 MG) BY MOUTH EVERY DAY, Disp: 90 tablet, Rfl: 2    gabapentin (NEURONTIN) 600 MG tablet, TAKE 1 TABLET(600 MG) BY MOUTH TWICE DAILY, Disp: 180 tablet, Rfl: 2    hydrOXYzine pamoate (VISTARIL) 25 MG Cap, , Disp: , Rfl:     lisinopriL (PRINIVIL,ZESTRIL) 5 MG tablet, TAKE 1 TABLET(5 MG) BY MOUTH EVERY DAY, Disp: 90 tablet, Rfl: 2    loratadine (CLARITIN) 10 mg tablet, Take 1 tablet (10 mg total) by mouth once daily., Disp: 30 tablet, Rfl: 5    meloxicam (MOBIC) 15 MG tablet, Take 1 tablet (15 mg total) by mouth once daily., Disp: 30 tablet, Rfl: 2    metFORMIN (GLUCOPHAGE) 1000 MG tablet, TAKE 1 TABLET(1000 MG) BY MOUTH TWICE DAILY WITH MEALS, Disp: 180 tablet, Rfl: 2    metoprolol tartrate (LOPRESSOR) 50 MG tablet, Take 1 tablet (50 mg total) by mouth 2 (two) times daily., Disp: 60 tablet, Rfl: 2    mometasone-formoterol (DULERA) 200-5 mcg/actuation inhaler, Inhale 1 puff into the lungs 2 (two) times daily., Disp: 8.8 g, Rfl: 1    multivitamin (THERAGRAN) per tablet, Take 1 tablet by mouth every morning., Disp: 90 tablet, Rfl: 2    NYSTOP powder, APPLY TO AFFECTED AREA TWICE DAILY, Disp: 60 g, Rfl: 2    pantoprazole (PROTONIX) 40 MG  tablet, TAKE 1 TABLET(40 MG) BY MOUTH EVERY DAY, Disp: 30 tablet, Rfl: 5    pravastatin (PRAVACHOL) 40 MG tablet, TAKE 1 TABLET(40 MG) BY MOUTH EVERY DAY, Disp: 90 tablet, Rfl: 2    risperidone (RISPERDAL) 3 MG Tab, take at bedtime, Disp: , Rfl: 1    simethicone (MYLICON) 125 mg Cap capsule, Take 1 capsule (125 mg total) by mouth 4 (four) times daily as needed., Disp: 30 capsule, Rfl: 0    VYVANSE 50 mg capsule, TK ONE C PO QAM, Disp: , Rfl: 0    albuterol (ACCUNEB) 0.63 mg/3 mL Nebu, Take 3 mLs (0.63 mg total) by nebulization every 8 (eight) hours as needed (wheezing). Rescue (Patient not taking: Reported on 12/3/2020), Disp: 1 Box, Rfl: 1    albuterol (PROAIR HFA) 90 mcg/actuation inhaler, INHALE 2 PUFFS BY MOUTH INTO THE LUNGS EVERY 6 HOURS AS NEEDED FOR WHEEZING. RESCUE (Patient not taking: Reported on 12/3/2020), Disp: 8.5 g, Rfl: 1    blood-glucose meter kit, To check BG once daily, to use with insurance preferred meter, Disp: 1 each, Rfl: 0    clotrimazole (LOTRIMIN) 1 % cream, Apply topically 2 (two) times daily. (Patient not taking: Reported on 12/3/2020), Disp: 28 g, Rfl: 1    doxycycline (VIBRA-TABS) 100 MG tablet, Take 1 tablet (100 mg total) by mouth 2 (two) times daily. (Patient not taking: Reported on 12/3/2020), Disp: 20 tablet, Rfl: 0    lancets Misc, To check BG once daily, to use with insurance preferred meter (Patient not taking: Reported on 12/3/2020), Disp: 30 each, Rfl: 2    nicotine (NICODERM CQ) 21 mg/24 hr, Place 1 patch onto the skin once daily. (Patient not taking: Reported on 12/3/2020), Disp: 28 patch, Rfl: 0    nicotine polacrilex 2 MG Lozg, 1-2 per hour in place of a cigarette. Limit to 10 a day - oral. (Patient not taking: Reported on 12/3/2020), Disp: 144 lozenge, Rfl: 0    omega-3 fatty acids/fish oil (FISH OIL-OMEGA-3 FATTY ACIDS) 300-1,000 mg capsule, Take 1 capsule by mouth once daily., Disp: , Rfl:     REVIEW OF SYSTEMS:  General: No fevers or chills; ENT: No sore  throat; Allergy and Immunology: no persistent infections; Hematological and Lymphatic: No history of bleeding or easy bruising; Endocrine: negative; Respiratory: no cough, shortness of breath, or wheezing; Cardiovascular: no chest pain or dyspnea on exertion; Gastrointestinal: no abdominal pain/back, change in bowel habits, or bloody stools; Genito-Urinary: no dysuria, trouble voiding, or hematuria; Musculoskeletal: negative; Neurological: no TIA or stroke symptoms; Psychiatric: no nervousness, anxiety or depression.    PHYSICAL EXAM:                General appearance:  Alert, well-appearing, and in no distress.  Oriented to person, place, and time                    Neurological: Normal speech, no focal findings noted; CN II - XII grossly intact. RLE with sensation to light touch, LLE with sensation to light touch.            Musculoskeletal: Digits/nail without cyanosis/clubbing.  Strength 5/5 BLE.                    Neck: Supple, no significant adenopathy, no carotid bruit can be auscultated                  Chest:  Clear to auscultation, no wheezes, rales or rhonchi, symmetric air entry. No use of accessory muscles               Cardiac: Normal rate and regular rhythm, S1 and S2 normal            Abdomen: Soft, nontender, nondistended, no masses or organomegaly, no hernia     No rebound tenderness noted; bowel sounds normal     Pulsatile aortic mass is non palpable.     No groin adenopathy      Extremities:        2+ R DP pulse, 2+ L DP pulse     2+ RLE edema, 2+ LLE edema    Skin: RLE without wounds; LLE without wounds    LAB RESULTS:  No results found for: CBC  Lab Results   Component Value Date    LABPROT 10.0 09/28/2020    INR 0.9 09/28/2020     Lab Results   Component Value Date     11/24/2020    K 4.2 11/24/2020    CL 99 11/24/2020    CO2 25 11/24/2020    GLU 93 11/24/2020    BUN 10 11/24/2020    CREATININE 0.7 11/24/2020    CALCIUM 8.7 11/24/2020    ANIONGAP 13 11/24/2020    EGFRNONAA >60 11/24/2020      Lab Results   Component Value Date    WBC 14.73 (H) 11/24/2020    RBC 4.12 11/24/2020    HGB 11.8 (L) 11/24/2020    HCT 35.5 (L) 11/24/2020    MCV 86 11/24/2020    MCH 28.6 11/24/2020    MCHC 33.2 11/24/2020    RDW 14.6 (H) 11/24/2020     (H) 11/24/2020    MPV 9.8 11/24/2020    GRAN 6.2 11/24/2020    GRAN 41.8 11/24/2020    LYMPH 6.6 (H) 11/24/2020    LYMPH 44.7 11/24/2020    MONO 1.3 (H) 11/24/2020    MONO 9.1 11/24/2020    EOS 0.4 11/24/2020    BASO 0.13 11/24/2020    EOSINOPHIL 3.0 11/24/2020    BASOPHIL 0.9 11/24/2020    DIFFMETHOD Automated 11/24/2020     .  Lab Results   Component Value Date    HGBA1C 7.3 (H) 09/29/2020    HGBA1C 7.3 (H) 09/29/2020       IMAGING:  All pertinent imaging has been reviewed and interpreted independently.    Venous US 9/2019: Left 1 of 2 PT vein with thrombus present, similar to previous US     9/16/19 JEYSON:  1. Right lower extremity pressures and waveforms indicate no hemodynamically significant arterial occlusive disease.  2. Left lower extremity pressures and waveforms indicate no hemodynamically significant arterial occlusive disease.     Venous reflux 12/12/19: RLE with GSV reflux at distal thigh to calf.  No DVT.    DVT LLE US 12/16/19: No LLE DVT    LLE venous US 3/2020:  Left lower extremity venous ultrasound shows greater saphenous vein reflux at the distal thigh.  There is popliteal vein reflux.  There is no lesser saphenous vein reflux.  There is no anterior accessory saphenous venous reflux.  There is no DVT.    11/2020:  1. Right lower extremity venous ultrasound shows greater saphenous vein reflux at the knee and calf.  There is no lesser saphenous vein reflux.  There is no anterior accessory saphenous venous reflux.  There is no DVT.  2. Left lower extremity venous ultrasound shows greater saphenous vein reflux at the saphenofemoral junction and the distal thigh.  There is popliteal vein reflux.  There is no lesser saphenous vein reflux.  There is no  anterior accessory saphenous venous reflux.  There is no DVT.    IMP/PLAN:  50 y.o. female with   Patient Active Problem List   Diagnosis    Essential hypertension    COPD (chronic obstructive pulmonary disease)    Hypertriglyceridemia    Tobacco abuse    Mild protein malnutrition    Diabetic neuropathy    Leg swelling    Incisional hernia without mention of obstruction or gangrene    DM type 2 without retinopathy    History of DVT (deep vein thrombosis)    History of pulmonary embolus (PE)    Bipolar 1 disorder    Gastroparesis due to DM    Thrombocytosis    Leukocytosis    Morbid obesity    Type 2 diabetes mellitus    Acne    Other chronic pain    Vomiting and diarrhea    Nuclear sclerosis of both eyes    Bilateral ocular hypertension    Refractive error    Long term (current) use of anticoagulants    Hypercoagulable state    History of pulmonary embolism    DVT, recurrent, lower extremity, chronic, left    Decreased ROM of ankle    Decreased strength of lower extremity    Balance problem    Gait abnormality    Calculus of gallbladder without cholecystitis without obstruction    Cholelithiasis    RUQ pain    Right upper quadrant abdominal pain    Leucocytosis    Fatty liver    Hepatomegaly    History of bariatric surgery    Need for prophylactic vaccination against hepatitis A and hepatitis B    Liver fibrosis    being managed by PCP and specialists who is here today for evaluation of BLE pain and edema.    -LLE pain and edema likely due to post thrombotic syndrome, no DVT on repeat venous duplex US with L distal thigh GSV reflux -recommend compression with Rx stockings, elevation, dietary changes associated with water and sodium intake discussed at length with patient   -R GSV distal reflux symptomatic although not lifestyle limiting- recommend compression with Rx stockings, elevation, dietary changes associated with water and sodium intake discussed at length with  patient  -Rec cont exercise  -Rec optimization of neuropathic and MSK pain with NSGY, Pain mgmt and Rheumatology - refer to Neuro and have pt f/u with NSGY; do not feel that laser ablation of isolated distal thigh GSV reflux will resolve her pain, will address once pain optimized and determine if EVLT necessary at that time  -Cont Hematology for comprehensive mgmt of thrombotic events  -Rec L GSV EVLT    I spent 12 minutes evaluating this patient and greater than 50% of the time was spent counseling, coordinator care and discussing the plan of care.  All questions were answered and patient stated understanding with agreement with the above treatment plan.    Tone Mata MD [facility]  Vascular and Endovascular Surgery

## 2020-12-05 NOTE — PROGRESS NOTES
"PATIENT: Audrey Natarajan  MRN: [id number]  DATE: 12/8/2020      Diagnosis:   1. Hypercoagulable state        Chief Complaint: Hypercoagulable evaluation    Subjective:    Initial History: Ms. Natarajan is a 50 y.o. female with PMHx HTN, COPD, DM s/p gastric bypass in 2016, recurrent VTEs with IVC filter, PAYNE with hepatomegaly, bipolar disorder, rosacea, tobacco use, cholecystectomy, splenectomy,and oophorectomy referred for evaluation of leukocytosis. Pt has had a persistent leukocytosis since the first values recorded in our system in 2011. As well has had a thrombocytosis of 300-550 over the same time period. States she has known about her leukocytosis for a long time. Was having abdominal pain which was finally diagnosed as "something wrong with my spleen" and underwent a splenectomy in 2003 with resolution of symptoms. Was told from that point on she would have an elevated white count. She has also underwent two bone marrow biopsies previously for this workup, one in 2003 and one in 2009, both reportedly normal. She denies any complaints today other than her typical allergies. Denies fevers, chills, night sweats, headache, vision changes. Does have a chronic cough.    Interval Hx: Pt returns for evaluation of hx of blood clots. Had a LLE DVT in 2003 after surgery, DVTs involving the right posterior tibial vein and the left femoral vein with a LLL PE in 2013 which is when she had an IVC filter was placed, and a left posterior tibial vein DVT in 2019 (per radiology report states similar to 2017 however 2017 report states unable to visualize posterior tibial veins). She has previously been on coumadin but does not believe she has ever been on a DOAC. Believes the last time she was on a blood thinner was in 2014. Denies have any issues when she was previously on a blood thinner. Her main complaint today is joint pain with the cold weather which usually improves after 3 days of similar weather. Also has her chronic " "LLE pain which she follows with vascular surgery for. Has been alternating ibuprofen and acetaminophen for pain relief as well as taking intermittent muscle relaxants. Has done well with smoking cessation, currently down to 4 cigarettes a day and planning to decrease to 2 in the near future.      Past Medical History:   Past Medical History:   Diagnosis Date    ADHD (attention deficit hyperactivity disorder)     Arthritis     Asthma     Bipolar 1 disorder     Cataract     COPD (chronic obstructive pulmonary disease)     Coronary artery disease     A fib    Depression     bipolar manic depresson    Diabetes mellitus     DVT of lower extremity, bilateral July 2013    bilateral LE DVT. Estelita filter placed.     Encounter for blood transfusion     History of blood clots 1. Left Leg=2003; 2.Bilateral Groin=Blood Clots= 5 or 6/ 2013 & 7/2013; 3. LLL of Lung=7/2013;  4. Lt. Lower Leg=7/2013.     Pt. had 1st Blood Clot after [id number], & Last=2013. Estelita Filter= Rt.Lateral Neck.    HTN (hypertension) 6/6/2013    Pt states that she does not have hypertension    Hypercholesteremia     Irregular heartbeat     Neuromuscular disorder     neuropathy feet    PE (pulmonary embolism) July 2013     bilat LE DVT.     Restless leg syndrome        Past Surgical HIstory:   Past Surgical History:   Procedure Laterality Date    ABDOMINAL SURGERY      BILATERAL OOPHORECTOMY Bilateral 1/12/2015    gastric sleeve  2016    Green' s filter Right 7/4/2012    Right Neck & Tunneled Down.    HERNIA REPAIR      "[place] [facility] around th [place].", pt. states    LAPAROSCOPIC CHOLECYSTECTOMY N/A 9/10/2020    Procedure: CHOLECYSTECTOMY, LAPAROSCOPIC;  Surgeon: Montrell Gutierrez MD;  [facility];  Service: General;  Laterality: N/A;  RN PREOP 9/9----COVID Negative  9/9    OOPHORECTOMY      OVARIAN CYST REMOVAL  3/13/2014    [place] REMOVAL OF OVARY/TUBE(S)      SPLENECTOMY, TOTAL  July 2003 "    TONSILLECTOMY      as a child    TYMPANOSTOMY TUBE PLACEMENT      VEIN SURGERY      Lt leg       Family History:   Family History   Problem Relation Age of Onset    Hypertension Father     Diabetes Father     Heart disease Father     Cataracts Father     Diabetes Paternal Grandfather     Heart disease Paternal Grandfather     No Known Problems Mother     Ovarian cancer Maternal Grandmother          from this. ? age     No Known Problems Sister     No Known Problems Brother     No Known Problems Maternal Aunt     No Known Problems Maternal Uncle     No Known Problems Paternal Aunt     No Known Problems Paternal Uncle     No Known Problems Maternal Grandfather     Ovarian cancer Paternal Grandmother     Uterine cancer Neg Hx     Breast cancer Neg Hx     Colon cancer Neg Hx     Amblyopia Neg Hx     Blindness Neg Hx     Cancer Neg Hx     Glaucoma Neg Hx     Macular degeneration Neg Hx     Retinal detachment Neg Hx     Strabismus Neg Hx     Stroke Neg Hx     Thyroid disease Neg Hx        Social History:  reports that she has been smoking cigarettes. She has a 12.50 pack-year smoking history. She has never used smokeless tobacco. She reports that she does not drink alcohol or use drugs.    Allergies:  Review of patient's allergies indicates:   Allergen Reactions    Morphine Other (See Comments)     Patient had a psychotic episode after taking Morphine  Agitation, hallucinations    Penicillins Anaphylaxis     itching    Januvia [sitagliptin] Hives       Medications:  Current Outpatient Medications   Medication Sig Dispense Refill    acetaminophen (ARTHRITIS PAIN RELIEVER) 650 MG TbSR Take 650 mg by mouth. Pt states taking 2 -3 times a day      albuterol (ACCUNEB) 0.63 mg/3 mL Nebu Take 3 mLs (0.63 mg total) by nebulization every 8 (eight) hours as needed (wheezing). Rescue 1 Box 1    albuterol (PROAIR HFA) 90 mcg/actuation inhaler INHALE 2 PUFFS BY MOUTH INTO THE LUNGS  EVERY 6 HOURS AS NEEDED FOR WHEEZING. RESCUE 8.5 g 1    aspirin (ECOTRIN) 81 MG EC tablet Take 81 mg by mouth once daily.       azelastine (ASTELIN) 137 mcg (0.1 %) nasal spray 1 spray (137 mcg total) by Nasal route 2 (two) times daily. 30 mL 0    b complex vitamins tablet Take 1 tablet by mouth once daily. 90 tablet 2    blood-glucose meter kit To check BG once daily, to use with insurance preferred meter 1 each 0    carbamazepine (TEGRETOL) 200 mg tablet TK 2 TS PO BID  1    cyanocobalamin (VITAMIN B-12) 100 MCG tablet Take 1 tablet (100 mcg total) by mouth once daily. 90 tablet 1    dicyclomine (BENTYL) 20 mg tablet Take 1 tablet (20 mg total) by mouth 2 (two) times daily. 20 tablet 0    diphenhydrAMINE (BENADRYL) 50 MG capsule Take 1 capsule (50 mg total) by mouth every 6 (six) hours as needed for Itching or Allergies (Swelling of the throat, rash and shortness of breath). 20 capsule 0    DULERA 100-5 mcg/actuation HFAA INHALE 2 PUFFS INTO THE LUNGS TWICE DAILY 13 g 2    DUPIXENT 300 mg/2 mL Syrg inject 300mg SUBCUTANEOUSLY every other week  6    famotidine (PEPCID) 20 MG tablet Take 1 tablet (20 mg total) by mouth 2 (two) times daily. 60 tablet 0    fluticasone propionate (FLONASE) 50 mcg/actuation nasal spray SHAKE LIQUID AND USE 1 SPRAY(50 MCG) IN EACH NOSTRIL TWICE DAILY 16 g 3    furosemide (LASIX) 20 MG tablet TAKE 1 TABLET(20 MG) BY MOUTH EVERY DAY 90 tablet 2    gabapentin (NEURONTIN) 600 MG tablet TAKE 1 TABLET(600 MG) BY MOUTH TWICE DAILY 180 tablet 2    hydrOXYzine pamoate (VISTARIL) 25 MG Cap       lancets Misc To check BG once daily, to use with insurance preferred meter 30 each 2    lisinopriL (PRINIVIL,ZESTRIL) 5 MG tablet TAKE 1 TABLET(5 MG) BY MOUTH EVERY DAY 90 tablet 2    loratadine (CLARITIN) 10 mg tablet Take 1 tablet (10 mg total) by mouth once daily. 30 tablet 5    meloxicam (MOBIC) 15 MG tablet Take 1 tablet (15 mg total) by mouth once daily. 30 tablet 2    metFORMIN  (GLUCOPHAGE) 1000 MG tablet TAKE 1 TABLET(1000 MG) BY MOUTH TWICE DAILY WITH MEALS 180 tablet 2    metoprolol tartrate (LOPRESSOR) 50 MG tablet Take 1 tablet (50 mg total) by mouth 2 (two) times daily. 60 tablet 2    mometasone-formoterol (DULERA) 200-5 mcg/actuation inhaler Inhale 1 puff into the lungs 2 (two) times daily. 8.8 g 1    multivitamin (THERAGRAN) per tablet Take 1 tablet by mouth every morning. 90 tablet 2    nicotine (NICODERM CQ) 21 mg/24 hr Place 1 patch onto the skin once daily. 28 patch 0    nicotine polacrilex 2 MG Lozg 1-2 per hour in place of a cigarette. Limit to 10 a day - oral. 144 lozenge 0    NYSTOP powder APPLY TO AFFECTED AREA TWICE DAILY 60 g 2    omega-3 fatty acids/fish oil (FISH OIL-OMEGA-3 FATTY ACIDS) 300-1,000 mg capsule Take 1 capsule by mouth once daily.      pantoprazole (PROTONIX) 40 MG tablet TAKE 1 TABLET(40 MG) BY MOUTH EVERY DAY 30 tablet 5    pravastatin (PRAVACHOL) 40 MG tablet TAKE 1 TABLET(40 MG) BY MOUTH EVERY DAY 90 tablet 2    risperidone (RISPERDAL) 3 MG Tab take at bedtime  1    simethicone (MYLICON) 125 mg Cap capsule Take 1 capsule (125 mg total) by mouth 4 (four) times daily as needed. 30 capsule 0    VYVANSE 50 mg capsule TK ONE C PO QAM  0    apixaban (ELIQUIS) 5 mg Tab Take 1 tablet (5 mg total) by mouth 2 (two) times daily. 60 tablet 6    ciclopirox (LOPROX) 0.77 % Susp Apply topically once daily. (Patient not taking: Reported on 12/8/2020) 30 mL 3    ciclopirox-nail lacquer removr 8 % Kit Apply 1 application topically once a week. (Patient not taking: Reported on 12/8/2020) 1 kit 4    clotrimazole (LOTRIMIN) 1 % cream Apply topically 2 (two) times daily. (Patient not taking: Reported on 12/3/2020) 28 g 1    doxycycline (VIBRA-TABS) 100 MG tablet Take 1 tablet (100 mg total) by mouth 2 (two) times daily. (Patient not taking: Reported on 12/3/2020) 20 tablet 0     No current facility-administered medications for this visit.        Review of  "Systems   Constitutional: Negative for appetite change, chills, fatigue and fever.   HENT: Negative for congestion, nosebleeds and sore throat.    Eyes: Negative for photophobia and visual disturbance.   Respiratory: Negative for cough and shortness of breath.    Cardiovascular: Negative for chest pain and palpitations.   Gastrointestinal: Negative for abdominal pain, diarrhea, nausea and vomiting.   Endocrine: Negative for cold intolerance, heat intolerance, polydipsia and polyuria.   Genitourinary: Negative for dysuria and urgency.   Musculoskeletal: Positive for arthralgias and myalgias.   Skin: Negative for rash and wound.   Neurological: Negative for dizziness and headaches.   Hematological: Negative for adenopathy. Does not bruise/bleed easily.   Psychiatric/Behavioral: Negative for confusion. The patient is not nervous/anxious.        Objective:      Vitals:   Vitals:    12/08/20 1005   BP: (!) 177/81   BP Location: Right arm   Patient Position: Sitting   BP Method: Large (Automatic)   Pulse: 87   Resp: (!) 24   Temp: 97.8 °F (36.6 °C)   TempSrc: Oral   SpO2: 97%   Weight: 108.7 kg (239 lb 8.5 oz)   Height: 5' 4" (1.626 m)       Physical Exam  Vitals signs reviewed.   Constitutional:       Appearance: Normal appearance. She is obese.   HENT:      Head: Normocephalic and atraumatic.      Mouth/Throat:      Mouth: Mucous membranes are moist.   Eyes:      Extraocular Movements: Extraocular movements intact.      Conjunctiva/sclera: Conjunctivae normal.   Neck:      Musculoskeletal: Normal range of motion and neck supple.   Cardiovascular:      Rate and Rhythm: Normal rate and regular rhythm.   Pulmonary:      Effort: Pulmonary effort is normal. No respiratory distress.      Breath sounds: No wheezing.   Abdominal:      General: Bowel sounds are normal.      Palpations: Abdomen is soft.   Musculoskeletal:      Right lower leg: Edema present.      Left lower leg: Edema present.   Skin:     General: Skin is warm " and dry.   Neurological:      General: No focal deficit present.      Mental Status: She is alert and oriented to person, place, and time.   Psychiatric:         Mood and Affect: Affect normal.         Behavior: Behavior is cooperative.         Laboratory Data:  Recent labs reviewed    Imaging:   CTA CAP 9/29/20  Impression:     1. No evidence of aortic aneurysm or dissection.  2. No acute intrathoracic or intra-abdominal abnormalities identified.  3. Small 4 mm right lower lobe pulmonary nodule.  For a solid nodule <6 mm, Fleischner Society 2017 guidelines recommend no routine follow up for a low risk patient, or follow-up with non-contrast chest CT at 12 months in a high risk patient.  4. Postsurgical changes of cholecystectomy, splenectomy, and sleeve gastrectomy.  5. Additional findings as detailed above.    US 9/29/20  Impression:     1. No acute abnormalities identified.  2. Cholecystectomy.  3. Hepatomegaly with suspected hepatic steatosis.    Assessment:       1. Hypercoagulable state           Plan:     Hx of DVTs  -- Previously seen by Hematology in September 2019  -- Prior workup as follows:  10/01/2: Cardiolipin negative, beta 2 glyocprotein negative, lupus anticoagulant negative, Antithrombin , cryoglobulin absent, prothrombin gene mutation negative, protein C elevated at 156, protein S normal at 97  9/25/19: Factor V leiden normal  -- IVC filter in since 2013 - IVC filters are typically used to prevent acute development of PE and usually do not remain place for years, especially in someone that can be anticoagulated.   -- Discussed risks and benefits of IVC filter and removal, will refer to IR to see if removal of IVC filter is possible, which it may not be after being in place for such a long period of time. Unsure exactly where filter was placed.  -- Not currently on anticoagulation; discussed risks and benefits of anticoagulation thoroughly with pt  -- Recommend DOAC for anticoagulation given  "hx of recurrent blood clots; Rx Apixaban  -- HAS-BLED score = 1, low risk for major bleeding      Leukocytosis  -- Pt has multiple reasons to have a leukocytosis including obesity, smoking, and hx of splenectomy  -- Has had persistent leukocytosis since at least 2011 in our system but pt reports for many years prior to that  -- Has had two bone marrow biopsies that pt states are normal and she does not want another one "for something that has caused me no problems"  -- Discussed that the splenectomy can lead to leukocytosis and thrombocytosis  -- Discussed that smoking can lead to leukocytosis, among many other health issues. Smoking cessation was provided. Pt has been working with a smoking cessation program and believes that is going well. Leukocytosis can persist for several years after cessation.  -- Discussed that obesity can lead to leukocytosis and encouraged weight loss for general health purposes  -- Peripheral smear morphologically unremarkable per pathologist interpretation      Benigno Keenan, PGY- IV  Hematology/Oncology Fellow         "

## 2020-12-08 ENCOUNTER — OFFICE VISIT (OUTPATIENT)
Dept: HEMATOLOGY/ONCOLOGY | Facility: CLINIC | Age: 50
End: 2020-12-08
Payer: MEDICAID

## 2020-12-08 VITALS
OXYGEN SATURATION: 97 % | RESPIRATION RATE: 24 BRPM | TEMPERATURE: 98 F | HEIGHT: 64 IN | HEART RATE: 87 BPM | WEIGHT: 239.5 LBS | DIASTOLIC BLOOD PRESSURE: 81 MMHG | SYSTOLIC BLOOD PRESSURE: 177 MMHG | BODY MASS INDEX: 40.89 KG/M2

## 2020-12-08 DIAGNOSIS — Z86.711 HISTORY OF PULMONARY EMBOLUS (PE): Chronic | ICD-10-CM

## 2020-12-08 DIAGNOSIS — Z79.01 LONG TERM (CURRENT) USE OF ANTICOAGULANTS: ICD-10-CM

## 2020-12-08 DIAGNOSIS — D68.59 HYPERCOAGULABLE STATE: Primary | ICD-10-CM

## 2020-12-08 DIAGNOSIS — D75.839 THROMBOCYTOSIS: ICD-10-CM

## 2020-12-08 DIAGNOSIS — Z86.711 HISTORY OF PULMONARY EMBOLISM: ICD-10-CM

## 2020-12-08 PROCEDURE — 99215 OFFICE O/P EST HI 40 MIN: CPT | Mod: PBBFAC | Performed by: INTERNAL MEDICINE

## 2020-12-08 PROCEDURE — 99214 PR OFFICE/OUTPT VISIT, EST, LEVL IV, 30-39 MIN: ICD-10-PCS | Mod: S$PBB,,, | Performed by: INTERNAL MEDICINE

## 2020-12-08 PROCEDURE — 99999 PR PBB SHADOW E&M-EST. PATIENT-LVL V: CPT | Mod: PBBFAC,,, | Performed by: INTERNAL MEDICINE

## 2020-12-08 PROCEDURE — 99999 PR PBB SHADOW E&M-EST. PATIENT-LVL V: ICD-10-PCS | Mod: PBBFAC,,, | Performed by: INTERNAL MEDICINE

## 2020-12-08 PROCEDURE — 99214 OFFICE O/P EST MOD 30 MIN: CPT | Mod: S$PBB,,, | Performed by: INTERNAL MEDICINE

## 2020-12-09 ENCOUNTER — CLINICAL SUPPORT (OUTPATIENT)
Dept: SMOKING CESSATION | Facility: CLINIC | Age: 50
End: 2020-12-09
Payer: COMMERCIAL

## 2020-12-09 DIAGNOSIS — F17.200 NICOTINE DEPENDENCE: Primary | ICD-10-CM

## 2020-12-09 DIAGNOSIS — I87.2 VENOUS INSUFFICIENCY: Primary | ICD-10-CM

## 2020-12-09 DIAGNOSIS — D68.59 HYPERCOAGULABLE STATE: Primary | ICD-10-CM

## 2020-12-09 PROCEDURE — 99402 PR PREVENT COUNSEL,INDIV,30 MIN: ICD-10-PCS | Mod: S$GLB,,,

## 2020-12-09 PROCEDURE — 99999 PR PBB SHADOW E&M-EST. PATIENT-LVL I: CPT | Mod: PBBFAC,,,

## 2020-12-09 PROCEDURE — 99999 PR PBB SHADOW E&M-EST. PATIENT-LVL I: ICD-10-PCS | Mod: PBBFAC,,,

## 2020-12-09 PROCEDURE — 99402 PREV MED CNSL INDIV APPRX 30: CPT | Mod: S$GLB,,,

## 2020-12-09 NOTE — PROGRESS NOTES
Individual Follow-Up Form    12/9/2020    Quit Date: 12/6/2020    Clinical Status of Patient: Outpatient    Length of Service: 30 minutes    Continuing Medication: yes  Patches or Nicotine Lozenges    Other Medications: none     Target Symptoms: Withdrawal and medication side effects. The following were  rated moderate (3) to severe (4) on TCRS:  · Moderate (3): none  · Severe (4): none    Comments: Telephonic visit due to Covid 19 pandemic.    Patient is tobacco free.  Pt remains on tobacco cessation medication of  21 mg nicotine patch QD and 2 mg nicotine lozenge  PRN (1-2 per hour in place of cigarettes.) No adverse effects noted at this time. Congratulated patient on her success Reviewed coping strategies/habitual behavior/relapse prevention with patient. Reviewed strategies, cues, triggers, high risk situations, lapses, relapses, diet, exercise, stress, relaxation, sleep, habitual behavior, and life style changes.        Diagnosis: F17.200    Next Visit: 1 week

## 2020-12-09 NOTE — PROGRESS NOTES
Subjective:      Patient ID: Audrey Natarajan is a 50 y.o. female.    Chief Complaint: Diabetes Mellitus (Dr Pena PCP 10/26/20) and Wound Check    Audrey Natarajan is a 50 y.o. female with  has a past medical history of ADHD (attention deficit hyperactivity disorder), Arthritis, Asthma, Bipolar 1 disorder, Cataract, COPD (chronic obstructive pulmonary disease), Coronary artery disease, Depression, Diabetes mellitus, DVT of lower extremity, bilateral, Encounter for blood transfusion, History of blood clots, HTN (hypertension), Hypercholesteremia, Irregular heartbeat, Neuromuscular disorder, PE (pulmonary embolism), and Restless leg syndrome. presents to the podiatry clinic for care of  left foot ulcer.   Location: plantar and midfoot Onset of the symptoms was several weeks ago. Precipitating event: LLE edema and only being able to wear slide on shoes..  History of injury: no Current symptoms include: redness and swelling. Signs of infection denies nausea, vomiting, fever, chills   Symptoms have progressed to a point and plateaued. Patient has had no prior foot problems. Evaluation to date: none. Treatment to date: neosporin. Patients rates pain 6/10 on pain scale.    11/18/2020 Audrey Natarajan is a 50 y.o. female returns to clinic for follow up of  left foot ulcers.  Patient has left football dressing clean, dry, intact.  Patient denies pain. No new pedal complaints. She has been taking antibiotics as prescribed without known adverse reaction    11/24/2020 patient returns to clinic for follow-up of left foot ulcer.  Patient does live football dressing clean, dry, intact until she went to the ED for abdominal pain and her dressing was removed.  She denies pain secondary to neuropathy.  No new pedal complaints.  She has completed antibiotics as prescribed.    12/02/2020 patient returns to clinic for follow-up of left foot ulcer.  Patient does live football dressing clean, dry, intact    Chief Complaint    Patient presents with    Diabetes Mellitus     Dr Pena PCP 10/26/20    Wound Check       Hemoglobin A1C   Date Value Ref Range Status   09/29/2020 7.3 (H) 4.0 - 5.6 % Final     Comment:     ADA Screening Guidelines:  5.7-6.4%  Consistent with prediabetes  >or=6.5%  Consistent with diabetes  High levels of fetal hemoglobin interfere with the HbA1C  assay. Heterozygous hemoglobin variants (HbS, HgC, etc)do  not significantly interfere with this assay.   However, presence of multiple variants may affect accuracy.     09/29/2020 7.3 (H) 4.0 - 5.6 % Final     Comment:     ADA Screening Guidelines:  5.7-6.4%  Consistent with prediabetes  >or=6.5%  Consistent with diabetes  High levels of fetal hemoglobin interfere with the HbA1C  assay. Heterozygous hemoglobin variants (HbS, HgC, etc)do  not significantly interfere with this assay.   However, presence of multiple variants may affect accuracy.     06/16/2020 7.6 (H) 4.0 - 5.6 % Final     Comment:     ADA Screening Guidelines:  5.7-6.4%  Consistent with prediabetes  >or=6.5%  Consistent with diabetes  High levels of fetal hemoglobin interfere with the HbA1C  assay. Heterozygous hemoglobin variants (HbS, HgC, etc)do  not significantly interfere with this assay.   However, presence of multiple variants may affect accuracy.               Patient Active Problem List   Diagnosis    Essential hypertension    COPD (chronic obstructive pulmonary disease)    Hypertriglyceridemia    Tobacco abuse    Mild protein malnutrition    Diabetic neuropathy    Leg swelling    Incisional hernia without mention of obstruction or gangrene    DM type 2 without retinopathy    History of DVT (deep vein thrombosis)    History of pulmonary embolus (PE)    Bipolar 1 disorder    Gastroparesis due to DM    Thrombocytosis    Leukocytosis    Morbid obesity    Type 2 diabetes mellitus    Acne    Other chronic pain    Vomiting and diarrhea    Nuclear sclerosis of both eyes     Bilateral ocular hypertension    Refractive error    Long term (current) use of anticoagulants    Hypercoagulable state    History of pulmonary embolism    DVT, recurrent, lower extremity, chronic, left    Decreased ROM of ankle    Decreased strength of lower extremity    Balance problem    Gait abnormality    Calculus of gallbladder without cholecystitis without obstruction    Cholelithiasis    RUQ pain    Right upper quadrant abdominal pain    Leucocytosis    Fatty liver    Hepatomegaly    History of bariatric surgery    Need for prophylactic vaccination against hepatitis A and hepatitis B    Liver fibrosis       Current Outpatient Medications on File Prior to Visit   Medication Sig Dispense Refill    acetaminophen (ARTHRITIS PAIN RELIEVER) 650 MG TbSR Take 650 mg by mouth. Pt states taking 2 -3 times a day      albuterol (ACCUNEB) 0.63 mg/3 mL Nebu Take 3 mLs (0.63 mg total) by nebulization every 8 (eight) hours as needed (wheezing). Rescue 1 Box 1    albuterol (PROAIR HFA) 90 mcg/actuation inhaler INHALE 2 PUFFS BY MOUTH INTO THE LUNGS EVERY 6 HOURS AS NEEDED FOR WHEEZING. RESCUE 8.5 g 1    aspirin (ECOTRIN) 81 MG EC tablet Take 81 mg by mouth once daily.       azelastine (ASTELIN) 137 mcg (0.1 %) nasal spray 1 spray (137 mcg total) by Nasal route 2 (two) times daily. 30 mL 0    b complex vitamins tablet Take 1 tablet by mouth once daily. 90 tablet 2    carbamazepine (TEGRETOL) 200 mg tablet TK 2 TS PO BID  1    ciclopirox (LOPROX) 0.77 % Susp Apply topically once daily. (Patient not taking: Reported on 12/8/2020) 30 mL 3    ciclopirox-nail lacquer removr 8 % Kit Apply 1 application topically once a week. (Patient not taking: Reported on 12/8/2020) 1 kit 4    clotrimazole (LOTRIMIN) 1 % cream Apply topically 2 (two) times daily. (Patient not taking: Reported on 12/3/2020) 28 g 1    cyanocobalamin (VITAMIN B-12) 100 MCG tablet Take 1 tablet (100 mcg total) by mouth once daily. 90  tablet 1    dicyclomine (BENTYL) 20 mg tablet Take 1 tablet (20 mg total) by mouth 2 (two) times daily. 20 tablet 0    diphenhydrAMINE (BENADRYL) 50 MG capsule Take 1 capsule (50 mg total) by mouth every 6 (six) hours as needed for Itching or Allergies (Swelling of the throat, rash and shortness of breath). 20 capsule 0    doxycycline (VIBRA-TABS) 100 MG tablet Take 1 tablet (100 mg total) by mouth 2 (two) times daily. (Patient not taking: Reported on 12/3/2020) 20 tablet 0    DULERA 100-5 mcg/actuation HFAA INHALE 2 PUFFS INTO THE LUNGS TWICE DAILY 13 g 2    DUPIXENT 300 mg/2 mL Syrg inject 300mg SUBCUTANEOUSLY every other week  6    famotidine (PEPCID) 20 MG tablet Take 1 tablet (20 mg total) by mouth 2 (two) times daily. 60 tablet 0    fluticasone propionate (FLONASE) 50 mcg/actuation nasal spray SHAKE LIQUID AND USE 1 SPRAY(50 MCG) IN EACH NOSTRIL TWICE DAILY 16 g 3    furosemide (LASIX) 20 MG tablet TAKE 1 TABLET(20 MG) BY MOUTH EVERY DAY 90 tablet 2    gabapentin (NEURONTIN) 600 MG tablet TAKE 1 TABLET(600 MG) BY MOUTH TWICE DAILY 180 tablet 2    hydrOXYzine pamoate (VISTARIL) 25 MG Cap       lancets Misc To check BG once daily, to use with insurance preferred meter 30 each 2    lisinopriL (PRINIVIL,ZESTRIL) 5 MG tablet TAKE 1 TABLET(5 MG) BY MOUTH EVERY DAY 90 tablet 2    loratadine (CLARITIN) 10 mg tablet Take 1 tablet (10 mg total) by mouth once daily. 30 tablet 5    meloxicam (MOBIC) 15 MG tablet Take 1 tablet (15 mg total) by mouth once daily. 30 tablet 2    metFORMIN (GLUCOPHAGE) 1000 MG tablet TAKE 1 TABLET(1000 MG) BY MOUTH TWICE DAILY WITH MEALS 180 tablet 2    metoprolol tartrate (LOPRESSOR) 50 MG tablet Take 1 tablet (50 mg total) by mouth 2 (two) times daily. 60 tablet 2    mometasone-formoterol (DULERA) 200-5 mcg/actuation inhaler Inhale 1 puff into the lungs 2 (two) times daily. 8.8 g 1    multivitamin (THERAGRAN) per tablet Take 1 tablet by mouth every morning. 90 tablet 2     "nicotine (NICODERM CQ) 21 mg/24 hr Place 1 patch onto the skin once daily. 28 patch 0    nicotine polacrilex 2 MG Lozg 1-2 per hour in place of a cigarette. Limit to 10 a day - oral. 144 lozenge 0    NYSTOP powder APPLY TO AFFECTED AREA TWICE DAILY 60 g 2    omega-3 fatty acids/fish oil (FISH OIL-OMEGA-3 FATTY ACIDS) 300-1,000 mg capsule Take 1 capsule by mouth once daily.      pantoprazole (PROTONIX) 40 MG tablet TAKE 1 TABLET(40 MG) BY MOUTH EVERY DAY 30 tablet 5    pravastatin (PRAVACHOL) 40 MG tablet TAKE 1 TABLET(40 MG) BY MOUTH EVERY DAY 90 tablet 2    risperidone (RISPERDAL) 3 MG Tab take at bedtime  1    simethicone (MYLICON) 125 mg Cap capsule Take 1 capsule (125 mg total) by mouth 4 (four) times daily as needed. 30 capsule 0    VYVANSE 50 mg capsule TK ONE C PO QAM  0    blood-glucose meter kit To check BG once daily, to use with insurance preferred meter 1 each 0     No current facility-administered medications on file prior to visit.        Review of patient's allergies indicates:   Allergen Reactions    Morphine Other (See Comments)     Patient had a psychotic episode after taking Morphine  Agitation, hallucinations    Penicillins Anaphylaxis     itching    Januvia [sitagliptin] Hives       Past Surgical History:   Procedure Laterality Date    ABDOMINAL SURGERY      BILATERAL OOPHORECTOMY Bilateral 1/12/2015    gastric sleeve  2016    Green' s filter Right 7/4/2012    Right Neck & Tunneled Down.    HERNIA REPAIR      "[facility] [employer] around th [place].", pt. states    LAPAROSCOPIC CHOLECYSTECTOMY N/A 9/10/2020    Procedure: CHOLECYSTECTOMY, LAPAROSCOPIC;  Surgeon: Montrell Gutierrez MD;  [facility];  Service: General;  Laterality: N/A;  RN PREOP 9/9----COVID Negative  9/9    OOPHORECTOMY      OVARIAN CYST REMOVAL  3/13/2014    CA REMOVAL OF OVARY/TUBE(S)      SPLENECTOMY, TOTAL  July 2003    TONSILLECTOMY      as a child    TYMPANOSTOMY TUBE PLACEMENT  1976 "    VEIN SURGERY      Lt leg       Family History   Problem Relation Age of Onset    Hypertension Father     Diabetes Father     Heart disease Father     Cataracts Father     Diabetes Paternal Grandfather     Heart disease Paternal Grandfather     No Known Problems Mother     Ovarian cancer Maternal Grandmother          from this. ? age     No Known Problems Sister     No Known Problems Brother     No Known Problems Maternal Aunt     No Known Problems Maternal Uncle     No Known Problems Paternal Aunt     No Known Problems Paternal Uncle     No Known Problems Maternal Grandfather     Ovarian cancer Paternal Grandmother     Uterine cancer Neg Hx     Breast cancer Neg Hx     Colon cancer Neg Hx     Amblyopia Neg Hx     Blindness Neg Hx     Cancer Neg Hx     Glaucoma Neg Hx     Macular degeneration Neg Hx     Retinal detachment Neg Hx     Strabismus Neg Hx     Stroke Neg Hx     Thyroid disease Neg Hx        Social History     Socioeconomic History    Marital status: Significant Other     Spouse name: Not on file    Number of children: Not on file    Years of education: Not on file    Highest education level: Not on file   Occupational History    Not on file   Social Needs    Financial resource strain: Not on file    Food insecurity     Worry: Not on file     Inability: Not on file    Transportation needs     Medical: Not on file     Non-medical: Not on file   Tobacco Use    Smoking status: Current Every Day Smoker     Packs/day: 0.50     Years: 25.00     Pack years: 12.50     Types: Cigarettes    Smokeless tobacco: Never Used   Substance and Sexual Activity    Alcohol use: No     Alcohol/week: 0.0 standard drinks    Drug use: No    Sexual activity: Yes     Partners: Male   Lifestyle    Physical activity     Days per week: Not on file     Minutes per session: Not on file    Stress: Not on file   Relationships    Social connections     Talks on phone: Not on file      "Gets together: Not on file     Attends [employer] service: Not on file     Active member of club or organization: Not on file     Attends meetings of clubs or organizations: Not on file     Relationship status: Not on file   Other Topics Concern    Not on file   Social History Narrative    Not on file       Review of Systems   Constitution: Negative for chills and fever.   Cardiovascular: Positive for leg swelling. Negative for claudication.   Respiratory: Negative for cough and shortness of breath.    Skin: Positive for dry skin and nail changes. Negative for itching and rash.   Musculoskeletal: Positive for arthritis, back pain, falls, joint pain and myalgias. Negative for joint swelling and muscle weakness.   Gastrointestinal: Negative for diarrhea, nausea and vomiting.   Neurological: Positive for numbness and paresthesias. Negative for tremors and weakness.   Psychiatric/Behavioral: Negative for altered mental status and hallucinations.           Objective:      Vitals:    12/02/20 0933   BP: 132/80   Weight: 104.3 kg (229 lb 15 oz)   Height: 5' 4" (1.626 m)       Physical Exam  Nursing note reviewed.   Constitutional:       General: She is not in acute distress.     Appearance: She is not toxic-appearing or diaphoretic.   Cardiovascular:      Pulses:           Dorsalis pedis pulses are 2+ on the right side and 2+ on the left side.        Posterior tibial pulses are 2+ on the right side and 2+ on the left side.   Pulmonary:      Effort: No respiratory distress.   Musculoskeletal:      Right ankle: She exhibits decreased range of motion and swelling. No tenderness. No lateral malleolus, no medial malleolus, no AITFL, no CF ligament and no posterior TFL tenderness found. Achilles tendon exhibits no pain, no defect and normal Paniagua's test results.      Left ankle: She exhibits decreased range of motion and swelling. Tenderness (anterior shin pain). No lateral malleolus, no medial malleolus, no AITFL, no CF " ligament, no posterior TFL and no proximal fibula tenderness found. Achilles tendon exhibits no pain, no defect and normal Paniagua's test results.      Right foot: No bony tenderness.      Left foot: Swelling present.      Comments: Biomechanical exam: There is equinus deformity bilateral with decreased dorsiflexion at the ankle joint bilateral. No tenderness with compression of heel. Negative tinels sign. Gait analysis reveals excessive pronation through midstance and propulsion with early heel off. Shoes reveals lateral heel counter wear bilateral     Decreased first MPJ range of motion both weightbearing and nonweightbearing, no crepitus observed the first MP joint, + dorsal flag sign. Mild  bunion deformity is observed .    Patient has hammertoes of digits 2-5 bilateral partially reducible without symptom today.     Skin:     General: Skin is warm and dry.      Coloration: Skin is not pale.      Findings: Wound present. No bruising, burn, laceration or rash.      Nails: There is no clubbing.        Comments: Ulcer location: sub 1st MTPJ, left  Measurements :0.1x0.1x0.1  cm   Signs of infection: local edema   Drainage: Sanguinous  Purulence: no  Crepitus/fluctuance: no  Periwound: Reddened, Macerated, Calloused  Base: Granular/ thin epithelial tissue    Toenails 1-5 bilaterally are thickened by 2-3 mm, discolored/yellowed, dystrophic, brittle with subungual debris.    Neurological:      Sensory: No sensory deficit.      Motor: No tremor, atrophy or abnormal muscle tone.      Deep Tendon Reflexes: Reflexes are normal and symmetric.      Comments: Paresthesias, and hyperesthesia bilateral feet at toes with no clearly identified trigger or source.    Kenneth-Gilberto 5.07 monofilament is intact bilateral feet. Sharp/dull sensation is also intact Bilateral feet.   Psychiatric:         Attention and Perception: She is attentive.         Mood and Affect: Mood is not anxious. Affect is not inappropriate.          Speech: She is communicative. Speech is not slurred.         Behavior: Behavior is not combative.       12/02/2020 11/24/2020 11/18/2020 11/11/2020                  Assessment:       Encounter Diagnoses   Name Primary?    Type II diabetes mellitus with neurological manifestations Yes    Left midfoot ulcer, with fat layer exposed          Plan:     Problem List Items Addressed This Visit     None      Visit Diagnoses     Type II diabetes mellitus with neurological manifestations    -  Primary    Left midfoot ulcer, with fat layer exposed                  I counseled the patient on her conditions, their implications and medical management.      Greater than 50% of this visit spent on counseling and coordination of care.    Education about the diabetic foot, neuropathy, and prevention of limb loss.    Discussed wound healing cycle, skin integrity, ways to care for skin.Counseled patient on the effects of smoking and  high blood glucose on healing. She verbalizes understanding that it can increase the chances of delayed healing and this prolonged exposure leads to infection or progression of infection which subsequently can result in loss of limb.    Adequate vitamin supplementation, protein intake, and hydration - discussed with patient    The wound is cleansed of foreign material as much as possible and the base inspected for bone or abscess.     Continued improvement noted    Dressings: mepilex  Offloading: football    Follow-up: 1 week but should call [facility] immediately if any signs of infection, such as fever, chills, sweats, increased redness or pain.    Short-term goals include maintaining good offloading and minimizing bioburden, promoting granulation and epithelialization to healing.  Long-term goals include keeping the wound healed by good offloading and medical management under the direction of internist.    Shoe inspection. Diabetic Foot Education. Patient reminded of the  importance of good nutrition and blood sugar control to help prevent podiatric complications of diabetes. Patient instructed on proper foot hygeine. We discussed wearing proper shoe gear, daily foot inspections, never walking without protective shoe gear, never putting sharp instruments to feet.          Procedures

## 2020-12-10 ENCOUNTER — TELEPHONE (OUTPATIENT)
Dept: CARDIOLOGY | Facility: CLINIC | Age: 50
End: 2020-12-10

## 2020-12-10 NOTE — TELEPHONE ENCOUNTER
----- Message from Corey Marti MD sent at 12/9/2020  9:37 PM CST -----  Regarding: FW: stopping Eliquis  This is Dr. Garnett's patient.  I'll forward him your request.    [facility]  ----- Message -----  From: Rhona Botello  Sent: 12/9/2020   3:10 PM CST  To: Corey Marti MD, #  Subject: stopping Eliquis                                 Hello Dr. Marti,  Ms. Natarajan is scheduled to have Endovenous Laser Ablation to her leg by Dr. Mata. Is she able to stop her Eliquis prior to her procedure, or would you prefer a cardiac clearance?

## 2020-12-11 ENCOUNTER — TELEPHONE (OUTPATIENT)
Dept: VASCULAR SURGERY | Facility: CLINIC | Age: 50
End: 2020-12-11

## 2020-12-11 NOTE — TELEPHONE ENCOUNTER
Spoke with patient. She has not started Eliquis as of yet due to pending procedure. Informed patient that she may start Eliquis since she sheduled for end of February. Informed patient that she will need to stop blood thinners 3 days prior to procedure, Dr. Garnett aware and authorized her to stop 3 days prior to procedure. Patient was inquiring about time for procedure. Explained I cannot give her a time until the week of the procedure as we will need to see other approvals and case load. Pt. Had no other questions at this time.

## 2020-12-14 ENCOUNTER — TELEPHONE (OUTPATIENT)
Dept: FAMILY MEDICINE | Facility: CLINIC | Age: 50
End: 2020-12-14

## 2020-12-14 DIAGNOSIS — R05.9 COUGH: Primary | ICD-10-CM

## 2020-12-14 NOTE — TELEPHONE ENCOUNTER
Patient is requesting cough syrup. She sts that she is having a bad cough. Her allergies is bothering her. Please advise!

## 2020-12-15 DIAGNOSIS — R05.9 COUGH: ICD-10-CM

## 2020-12-15 DIAGNOSIS — M54.32 LEFT SIDED SCIATICA: ICD-10-CM

## 2020-12-15 RX ORDER — GABAPENTIN 600 MG/1
TABLET ORAL
Qty: 180 TABLET | Refills: 2 | Status: ON HOLD | OUTPATIENT
Start: 2020-12-15 | End: 2021-02-20 | Stop reason: HOSPADM

## 2020-12-15 NOTE — TELEPHONE ENCOUNTER
I called and spoke to the pt and advised she will need an appointment. She will go to urgent care for evaluation.

## 2020-12-15 NOTE — TELEPHONE ENCOUNTER
----- Message from Hayes Vieyra sent at 12/15/2020  2:19 PM CST -----  Regarding: self  Type: Patient Call Back    Who called: Self    What is the request in detail:dextromethorphan-guaifenesin  mg/5 mL Liqd    Can the clinic reply by [facility]? No     Would the patient rather a call back or a response via [facility]? Call     Best call back number: [phone]       Pt stated that she has robitussin otc and its not working

## 2020-12-16 ENCOUNTER — HOSPITAL ENCOUNTER (OUTPATIENT)
Dept: RADIOLOGY | Facility: HOSPITAL | Age: 50
Discharge: HOME | End: 2020-12-16
Attending: PHYSICIAN ASSISTANT
Payer: MEDICAID

## 2020-12-16 DIAGNOSIS — D68.59 HYPERCOAGULABLE STATE: ICD-10-CM

## 2020-12-16 PROCEDURE — 93970 EXTREMITY STUDY: CPT | Mod: 26,,, | Performed by: RADIOLOGY

## 2020-12-16 PROCEDURE — 93970 EXTREMITY STUDY: CPT | Mod: TC

## 2020-12-16 PROCEDURE — 93970 US LOWER EXTREMITY VEINS BILATERAL: ICD-10-PCS | Mod: 26,,, | Performed by: RADIOLOGY

## 2020-12-17 ENCOUNTER — TELEPHONE (OUTPATIENT)
Dept: PODIATRY | Facility: CLINIC | Age: 50
End: 2020-12-17

## 2020-12-22 ENCOUNTER — OFFICE VISIT (OUTPATIENT)
Dept: INTERVENTIONAL RADIOLOGY/VASCULAR | Facility: CLINIC | Age: 50
End: 2020-12-22
Payer: MEDICAID

## 2020-12-22 VITALS
WEIGHT: 227.31 LBS | BODY MASS INDEX: 36.53 KG/M2 | SYSTOLIC BLOOD PRESSURE: 139 MMHG | HEART RATE: 97 BPM | HEIGHT: 66 IN | DIASTOLIC BLOOD PRESSURE: 78 MMHG

## 2020-12-22 DIAGNOSIS — Z86.718 HISTORY OF DVT OF LOWER EXTREMITY: ICD-10-CM

## 2020-12-22 DIAGNOSIS — Z86.711 HISTORY OF PULMONARY EMBOLISM: ICD-10-CM

## 2020-12-22 DIAGNOSIS — Z95.828 PRESENCE OF IVC FILTER: Primary | ICD-10-CM

## 2020-12-22 PROCEDURE — 99999 PR PBB SHADOW E&M-EST. PATIENT-LVL IV: CPT | Mod: PBBFAC,,, | Performed by: FAMILY MEDICINE

## 2020-12-22 PROCEDURE — 99214 OFFICE O/P EST MOD 30 MIN: CPT | Mod: PBBFAC | Performed by: FAMILY MEDICINE

## 2020-12-22 PROCEDURE — 99999 PR PBB SHADOW E&M-EST. PATIENT-LVL IV: ICD-10-PCS | Mod: PBBFAC,,, | Performed by: FAMILY MEDICINE

## 2020-12-22 PROCEDURE — 99214 PR OFFICE/OUTPT VISIT, EST, LEVL IV, 30-39 MIN: ICD-10-PCS | Mod: S$PBB,,, | Performed by: FAMILY MEDICINE

## 2020-12-22 PROCEDURE — 99214 OFFICE O/P EST MOD 30 MIN: CPT | Mod: S$PBB,,, | Performed by: FAMILY MEDICINE

## 2020-12-22 RX ORDER — MUPIROCIN 20 MG/G
OINTMENT TOPICAL 3 TIMES DAILY
Status: ON HOLD | COMMUNITY
End: 2021-02-20 | Stop reason: HOSPADM

## 2020-12-22 NOTE — Clinical Note
"Thank you for referring Ms. Natarajan to Interventional Radiology at the [facility]. Please don't hesitate to contact us if there are any questions regarding this evaluation at [phone]. If you have any other patients for whom you would like a consultation, please place an order for "[id number]", and we will be happy to review their case.    Sincerely,  GIOVANNY Schwartz, FNP  Interventional Radiology        "

## 2020-12-22 NOTE — PROGRESS NOTES
"Subjective:       Patient ID: Audrey Natarajan is a 50 y.o. female.    Chief Complaint: Deep Vein Thrombosis (IVC filter in place)    Patient referred to Interventional Radiology by Dr. Keenan to discuss IVC filter retrieval. Patient has a history of pulmonary embolism and bilateral lower extremity DVTs in 2013. IVC filter was placed at that time. She reports feeling well, but has complaints of decreased activity due to LLE pain. She is in care for this with vascular surgery. She underwent an ultrasound on 12/16/2020 that showed "No evidence of lower extremity deep venous thrombosis." She endorses adherence to her eliquis, aspirin, and fish oil.    Review of Systems   Constitutional: Positive for activity change (decreased due to neuropathy and foot swelling). Negative for appetite change, chills, fatigue and fever.   HENT: Positive for hearing loss (in left ear). Negative for nasal congestion, drooling, ear discharge, postnasal drip, sneezing and trouble swallowing.    Eyes: Negative for pain, discharge, redness and itching.   Respiratory: Negative for cough, shortness of breath, wheezing and stridor.    Cardiovascular: Positive for leg swelling (left leg). Negative for chest pain and palpitations.   Gastrointestinal: Positive for abdominal distention (sometimes). Negative for abdominal pain, constipation, diarrhea, nausea and vomiting.   Genitourinary: Negative for difficulty urinating, dysuria, frequency and urgency.   Musculoskeletal: Positive for arthralgias. Negative for back pain, gait problem, joint swelling, myalgias and neck pain.        Neuropathy   Integumentary:  Negative for color change, pallor and rash.   Neurological: Positive for dizziness (sometimes when I stand up too fast). Negative for weakness and headaches.         Objective:      Physical Exam  Vitals signs reviewed.   Constitutional:       General: She is not in acute distress.     Appearance: She is well-developed. She is not " "diaphoretic.   HENT:      Head: Normocephalic and atraumatic.      Right Ear: External ear normal.      Left Ear: External ear normal.      Nose: Nose normal.      Mouth/Throat:      Pharynx: No oropharyngeal exudate.   Eyes:      General: No scleral icterus.        Right eye: No discharge.         Left eye: No discharge.      Conjunctiva/sclera: Conjunctivae normal.      Pupils: Pupils are equal, round, and reactive to light.   Neck:      Musculoskeletal: Neck supple.      Thyroid: No thyromegaly.      Vascular: No JVD.      Trachea: No tracheal deviation.   Cardiovascular:      Rate and Rhythm: Normal rate and regular rhythm.      Heart sounds: Normal heart sounds. No murmur. No friction rub. No gallop.    Pulmonary:      Effort: Pulmonary effort is normal. No respiratory distress.      Breath sounds: Normal breath sounds. No stridor. No wheezing or rales.   Abdominal:      General: Bowel sounds are normal. There is no distension.      Palpations: Abdomen is soft. There is no mass.      Tenderness: There is no abdominal tenderness. There is no guarding or rebound.   Lymphadenopathy:      Cervical: No cervical adenopathy.   Skin:     General: Skin is warm and dry.      Nails: There is no clubbing.     Neurological:      Mental Status: She is alert and oriented to person, place, and time.      Gait: Gait abnormal (patient favors left foot).        Assessment:       1. Presence of IVC filter    2. History of pulmonary embolism    3. History of DVT of lower extremity        Plan:         Explained to patient recommendation is to retrieve IVC filter. Discussed how the procedure will be performed, risks (including, but not limited to, pain, bleeding, infection, damage to nearby structures, and the need for additional procedures), benefits, possible complications, pre-post procedure expectations, and alternatives. The patient voices understanding and all questions have been answered. She expresses concern over "feeling" " anything during the procedure. Reassured we can schedule with anesthesia. The patient agrees to proceed as planned. We will call patient to schedule.

## 2020-12-23 ENCOUNTER — OFFICE VISIT (OUTPATIENT)
Dept: PODIATRY | Facility: CLINIC | Age: 50
End: 2020-12-23
Payer: MEDICAID

## 2020-12-23 VITALS
HEIGHT: 66 IN | SYSTOLIC BLOOD PRESSURE: 122 MMHG | DIASTOLIC BLOOD PRESSURE: 87 MMHG | BODY MASS INDEX: 36.53 KG/M2 | WEIGHT: 227.31 LBS

## 2020-12-23 DIAGNOSIS — M20.41 HAMMER TOES OF BOTH FEET: ICD-10-CM

## 2020-12-23 DIAGNOSIS — M20.12 HALLUX ABDUCTO VALGUS, LEFT: ICD-10-CM

## 2020-12-23 DIAGNOSIS — M20.5X1 HALLUX LIMITUS, ACQUIRED, RIGHT: ICD-10-CM

## 2020-12-23 DIAGNOSIS — L84 PRE-ULCERATIVE CALLUSES: ICD-10-CM

## 2020-12-23 DIAGNOSIS — M20.5X2 HALLUX LIMITUS, ACQUIRED, LEFT: ICD-10-CM

## 2020-12-23 DIAGNOSIS — M20.42 HAMMER TOES OF BOTH FEET: ICD-10-CM

## 2020-12-23 DIAGNOSIS — E11.49 TYPE II DIABETES MELLITUS WITH NEUROLOGICAL MANIFESTATIONS: Primary | ICD-10-CM

## 2020-12-23 DIAGNOSIS — Z86.31 HISTORY OF DIABETIC ULCER OF FOOT: ICD-10-CM

## 2020-12-23 PROCEDURE — 99213 PR OFFICE/OUTPT VISIT, EST, LEVL III, 20-29 MIN: ICD-10-PCS | Mod: S$PBB,,, | Performed by: PODIATRIST

## 2020-12-23 PROCEDURE — 99215 OFFICE O/P EST HI 40 MIN: CPT | Mod: PBBFAC,PO | Performed by: PODIATRIST

## 2020-12-23 PROCEDURE — 99999 PR PBB SHADOW E&M-EST. PATIENT-LVL V: ICD-10-PCS | Mod: PBBFAC,,, | Performed by: PODIATRIST

## 2020-12-23 PROCEDURE — 99213 OFFICE O/P EST LOW 20 MIN: CPT | Mod: S$PBB,,, | Performed by: PODIATRIST

## 2020-12-23 PROCEDURE — 99999 PR PBB SHADOW E&M-EST. PATIENT-LVL V: CPT | Mod: PBBFAC,,, | Performed by: PODIATRIST

## 2020-12-23 NOTE — PROGRESS NOTES
Subjective:      Patient ID: Audrey Natarajan is a 50 y.o. female.    Chief Complaint: Diabetes Mellitus (Dr Pena PCP) and Follow-up    Audrey Natarajan is a 50 y.o. female with  has a past medical history of ADHD (attention deficit hyperactivity disorder), Arthritis, Asthma, Bipolar 1 disorder, Cataract, COPD (chronic obstructive pulmonary disease), Coronary artery disease, Depression, Diabetes mellitus, DVT of lower extremity, bilateral, Encounter for blood transfusion, History of blood clots, HTN (hypertension), Hypercholesteremia, Irregular heartbeat, Neuromuscular disorder, PE (pulmonary embolism), and Restless leg syndrome. presents to the podiatry clinic for care of  left foot ulcer.   Location: plantar and midfoot Onset of the symptoms was several weeks ago. Precipitating event: LLE edema and only being able to wear slide on shoes..  History of injury: no Current symptoms include: redness and swelling. Signs of infection denies nausea, vomiting, fever, chills   Symptoms have progressed to a point and plateaued. Patient has had no prior foot problems. Evaluation to date: none. Treatment to date: neosporin. Patients rates pain 6/10 on pain scale.    11/18/2020 Audrey Natarajan is a 50 y.o. female returns to clinic for follow up of  left foot ulcers.  Patient has left football dressing clean, dry, intact.  Patient denies pain. No new pedal complaints. She has been taking antibiotics as prescribed without known adverse reaction    11/24/2020 patient returns to clinic for follow-up of left foot ulcer.  Patient does live football dressing clean, dry, intact until she went to the ED for abdominal pain and her dressing was removed.  She denies pain secondary to neuropathy.  No new pedal complaints.  She has completed antibiotics as prescribed.    12/02/2020 patient returns to clinic for follow-up of left foot ulcer.  Patient does live football dressing clean, dry, intact    12/23/2020 patient relates  she has continued to care for the left foot as instructed with Betadine paint and offloading padding, however she presents to clinic today in worn slides.  She relates that secondary to continued swelling to her left lower extremity she has difficulty donning her diabetic shoes and ends up having to wear would ever can not fit over her edema.  Patient relates that yesterday she had to do a lot of walking at the [facility] the which resulted in some tenderness to the previous ulcer spot.  She denies seeing any drainage or evidence of re ulceration.    Chief Complaint   Patient presents with    Diabetes Mellitus     Dr Pena PCP    Follow-up       Hemoglobin A1C   Date Value Ref Range Status   09/29/2020 7.3 (H) 4.0 - 5.6 % Final     Comment:     ADA Screening Guidelines:  5.7-6.4%  Consistent with prediabetes  >or=6.5%  Consistent with diabetes  High levels of fetal hemoglobin interfere with the HbA1C  assay. Heterozygous hemoglobin variants (HbS, HgC, etc)do  not significantly interfere with this assay.   However, presence of multiple variants may affect accuracy.     09/29/2020 7.3 (H) 4.0 - 5.6 % Final     Comment:     ADA Screening Guidelines:  5.7-6.4%  Consistent with prediabetes  >or=6.5%  Consistent with diabetes  High levels of fetal hemoglobin interfere with the HbA1C  assay. Heterozygous hemoglobin variants (HbS, HgC, etc)do  not significantly interfere with this assay.   However, presence of multiple variants may affect accuracy.     06/16/2020 7.6 (H) 4.0 - 5.6 % Final     Comment:     ADA Screening Guidelines:  5.7-6.4%  Consistent with prediabetes  >or=6.5%  Consistent with diabetes  High levels of fetal hemoglobin interfere with the HbA1C  assay. Heterozygous hemoglobin variants (HbS, HgC, etc)do  not significantly interfere with this assay.   However, presence of multiple variants may affect accuracy.               Patient Active Problem List   Diagnosis    Essential hypertension     COPD (chronic obstructive pulmonary disease)    Hypertriglyceridemia    Tobacco abuse    Mild protein malnutrition    Diabetic neuropathy    Leg swelling    Incisional hernia without mention of obstruction or gangrene    DM type 2 without retinopathy    History of DVT (deep vein thrombosis)    History of pulmonary embolus (PE)    Bipolar 1 disorder    Gastroparesis due to DM    Thrombocytosis    Leukocytosis    Morbid obesity    Type 2 diabetes mellitus    Acne    Other chronic pain    Vomiting and diarrhea    Nuclear sclerosis of both eyes    Bilateral ocular hypertension    Refractive error    Long term (current) use of anticoagulants    Hypercoagulable state    History of pulmonary embolism    DVT, recurrent, lower extremity, chronic, left    Decreased ROM of ankle    Decreased strength of lower extremity    Balance problem    Gait abnormality    Calculus of gallbladder without cholecystitis without obstruction    Cholelithiasis    RUQ pain    Right upper quadrant abdominal pain    Leucocytosis    Fatty liver    Hepatomegaly    History of bariatric surgery    Need for prophylactic vaccination against hepatitis A and hepatitis B    Liver fibrosis    History of diabetic ulcer of foot       Current Outpatient Medications on File Prior to Visit   Medication Sig Dispense Refill    acetaminophen (ARTHRITIS PAIN RELIEVER) 650 MG TbSR Take 650 mg by mouth. Pt states taking 2 -3 times a day      albuterol (ACCUNEB) 0.63 mg/3 mL Nebu Take 3 mLs (0.63 mg total) by nebulization every 8 (eight) hours as needed (wheezing). Rescue 1 Box 1    albuterol (PROAIR HFA) 90 mcg/actuation inhaler INHALE 2 PUFFS BY MOUTH INTO THE LUNGS EVERY 6 HOURS AS NEEDED FOR WHEEZING. RESCUE 8.5 g 1    apixaban (ELIQUIS) 5 mg Tab Take 1 tablet (5 mg total) by mouth 2 (two) times daily. 60 tablet 6    aspirin (ECOTRIN) 81 MG EC tablet Take 81 mg by mouth once daily.       azelastine (ASTELIN) 137 mcg  (0.1 %) nasal spray 1 spray (137 mcg total) by Nasal route 2 (two) times daily. 30 mL 0    b complex vitamins tablet Take 1 tablet by mouth once daily. 90 tablet 2    carbamazepine (TEGRETOL) 200 mg tablet TK 2 TS PO BID  1    ciclopirox (LOPROX) 0.77 % Susp Apply topically once daily. 30 mL 3    ciclopirox-nail lacquer removr 8 % Kit Apply 1 application topically once a week. 1 kit 4    clotrimazole (LOTRIMIN) 1 % cream Apply topically 2 (two) times daily. 28 g 1    cyanocobalamin (VITAMIN B-12) 100 MCG tablet Take 1 tablet (100 mcg total) by mouth once daily. 90 tablet 1    dextromethorphan-guaifenesin  mg/5 mL Liqd Take 5 mLs by mouth every 4 (four) hours as needed (cough). 120 mL 0    dicyclomine (BENTYL) 20 mg tablet Take 1 tablet (20 mg total) by mouth 2 (two) times daily. 20 tablet 0    diphenhydrAMINE (BENADRYL) 50 MG capsule Take 1 capsule (50 mg total) by mouth every 6 (six) hours as needed for Itching or Allergies (Swelling of the throat, rash and shortness of breath). 20 capsule 0    doxycycline (VIBRA-TABS) 100 MG tablet Take 1 tablet (100 mg total) by mouth 2 (two) times daily. 20 tablet 0    DULERA 100-5 mcg/actuation HFAA INHALE 2 PUFFS INTO THE LUNGS TWICE DAILY 13 g 2    DUPIXENT 300 mg/2 mL Syrg inject 300mg SUBCUTANEOUSLY every other week  6    famotidine (PEPCID) 20 MG tablet Take 1 tablet (20 mg total) by mouth 2 (two) times daily. 60 tablet 0    fluticasone propionate (FLONASE) 50 mcg/actuation nasal spray SHAKE LIQUID AND USE 1 SPRAY(50 MCG) IN EACH NOSTRIL TWICE DAILY 16 g 3    furosemide (LASIX) 20 MG tablet TAKE 1 TABLET(20 MG) BY MOUTH EVERY DAY 90 tablet 2    gabapentin (NEURONTIN) 600 MG tablet TAKE 1 TABLET(600 MG) BY MOUTH TWICE DAILY 180 tablet 2    hydrOXYzine pamoate (VISTARIL) 25 MG Cap       lancets Misc To check BG once daily, to use with insurance preferred meter 30 each 2    lisinopriL (PRINIVIL,ZESTRIL) 5 MG tablet TAKE 1 TABLET(5 MG) BY MOUTH EVERY  DAY 90 tablet 2    loratadine (CLARITIN) 10 mg tablet Take 1 tablet (10 mg total) by mouth once daily. 30 tablet 5    meloxicam (MOBIC) 15 MG tablet Take 1 tablet (15 mg total) by mouth once daily. 30 tablet 2    metFORMIN (GLUCOPHAGE) 1000 MG tablet TAKE 1 TABLET(1000 MG) BY MOUTH TWICE DAILY WITH MEALS 180 tablet 2    metoprolol tartrate (LOPRESSOR) 50 MG tablet Take 1 tablet (50 mg total) by mouth 2 (two) times daily. 60 tablet 2    mometasone-formoterol (DULERA) 200-5 mcg/actuation inhaler Inhale 1 puff into the lungs 2 (two) times daily. 8.8 g 1    multivitamin (THERAGRAN) per tablet Take 1 tablet by mouth every morning. 90 tablet 2    mupirocin (BACTROBAN) 2 % ointment Apply topically 3 (three) times daily.      nicotine (NICODERM CQ) 21 mg/24 hr Place 1 patch onto the skin once daily. 28 patch 0    nicotine polacrilex 2 MG Lozg 1-2 per hour in place of a cigarette. Limit to 10 a day - oral. 144 lozenge 0    NYSTOP powder APPLY TO AFFECTED AREA TWICE DAILY 60 g 2    omega-3 fatty acids/fish oil (FISH OIL-OMEGA-3 FATTY ACIDS) 300-1,000 mg capsule Take 1 capsule by mouth once daily.      pantoprazole (PROTONIX) 40 MG tablet TAKE 1 TABLET(40 MG) BY MOUTH EVERY DAY 30 tablet 5    pravastatin (PRAVACHOL) 40 MG tablet TAKE 1 TABLET(40 MG) BY MOUTH EVERY DAY 90 tablet 2    risperidone (RISPERDAL) 3 MG Tab take at bedtime  1    simethicone (MYLICON) 125 mg Cap capsule Take 1 capsule (125 mg total) by mouth 4 (four) times daily as needed. 30 capsule 0    VYVANSE 50 mg capsule TK ONE C PO QAM  0    blood-glucose meter kit To check BG once daily, to use with insurance preferred meter 1 each 0     No current facility-administered medications on file prior to visit.        Review of patient's allergies indicates:   Allergen Reactions    Morphine Other (See Comments)     Patient had a psychotic episode after taking Morphine  Agitation, hallucinations    Penicillins Anaphylaxis     itching    Januvia  "[sitagliptin] Hives       Past Surgical History:   Procedure Laterality Date    ABDOMINAL SURGERY  2010    gastric sleeve    BILATERAL OOPHORECTOMY Bilateral 2015    CHOLECYSTECTOMY      Green' s filter Right 2012    Right Neck & Tunneled Down.    HERNIA REPAIR      "[place] of Hernias Repaires around th [place].", pt. states    LAPAROSCOPIC CHOLECYSTECTOMY N/A 9/10/2020    Procedure: CHOLECYSTECTOMY, LAPAROSCOPIC;  Surgeon: Montrell Gutierrez MD;  Location: [facility];  Service: General;  Laterality: N/A;  RN PREOP ----COVID Negative      OVARIAN CYST REMOVAL  3/13/2014    AR REMOVAL OF OVARY/TUBE(S)      SPLENECTOMY, TOTAL  2003    TONSILLECTOMY      as a child    TYMPANOSTOMY TUBE PLACEMENT      VEIN SURGERY      Lt leg       Family History   Problem Relation Age of Onset    Hypertension Father     Diabetes Father     Heart disease Father     Cataracts Father     Diabetes Paternal Grandfather     Heart disease Paternal Grandfather     No Known Problems Mother     Ovarian cancer Maternal Grandmother          from this. ? age     No Known Problems Sister     No Known Problems Brother     No Known Problems Maternal Aunt     No Known Problems Maternal Uncle     No Known Problems Paternal Aunt     No Known Problems Paternal Uncle     No Known Problems Maternal Grandfather     Ovarian cancer Paternal Grandmother     Uterine cancer Neg Hx     Breast cancer Neg Hx     Colon cancer Neg Hx     Amblyopia Neg Hx     Blindness Neg Hx     Cancer Neg Hx     Glaucoma Neg Hx     Macular degeneration Neg Hx     Retinal detachment Neg Hx     Strabismus Neg Hx     Stroke Neg Hx     Thyroid disease Neg Hx        Social History     Socioeconomic History    Marital status: Significant Other     Spouse name: Not on file    Number of children: Not on file    Years of education: Not on file    Highest education level: Not on file   Occupational History    Not on " "file   Social Needs    Financial resource strain: Not on file    Food insecurity     Worry: Not on file     Inability: Not on file    Transportation needs     Medical: Not on file     Non-medical: Not on file   Tobacco Use    Smoking status: Former Smoker     Packs/day: 0.50     Years: 25.00     Pack years: 12.50     Types: Cigarettes     Quit date: 2020     Years since quittin.0    Smokeless tobacco: Never Used    Tobacco comment: still smoking 2 cigarettes each day   Substance and Sexual Activity    Alcohol use: No     Alcohol/week: 0.0 standard drinks    Drug use: No    Sexual activity: Yes     Partners: Male   Lifestyle    Physical activity     Days per week: Not on file     Minutes per session: Not on file    Stress: Not on file   Relationships    Social connections     Talks on phone: Not on file     Gets together: Not on file     Attends Advent service: Not on file     Active member of club or organization: Not on file     Attends meetings of clubs or organizations: Not on file     Relationship status: Not on file   Other Topics Concern    Not on file   Social History Narrative    Not on file       Review of Systems   Constitution: Negative for chills and fever.   Cardiovascular: Positive for leg swelling. Negative for claudication.   Respiratory: Negative for cough and shortness of breath.    Skin: Positive for dry skin and nail changes. Negative for itching and rash.   Musculoskeletal: Positive for arthritis, back pain, falls, joint pain and myalgias. Negative for joint swelling and muscle weakness.   Gastrointestinal: Negative for diarrhea, nausea and vomiting.   Neurological: Positive for numbness and paresthesias. Negative for tremors and weakness.   Psychiatric/Behavioral: Negative for altered mental status and hallucinations.           Objective:      Vitals:    20 1419   BP: 122/87   Weight: 103.1 kg (227 lb 4.7 oz)   Height: 5' 6" (1.676 m)   PainSc:   7   PainLoc: " Foot       Physical Exam  Nursing note reviewed.   Constitutional:       General: She is not in acute distress.     Appearance: She is not toxic-appearing or diaphoretic.   Cardiovascular:      Pulses:           Dorsalis pedis pulses are 2+ on the right side and 2+ on the left side.        Posterior tibial pulses are 2+ on the right side and 2+ on the left side.   Pulmonary:      Effort: No respiratory distress.   Musculoskeletal:      Right ankle: She exhibits decreased range of motion and swelling. No tenderness. No lateral malleolus, no medial malleolus, no AITFL, no CF ligament and no posterior TFL tenderness found. Achilles tendon exhibits no pain, no defect and normal Paniagua's test results.      Left ankle: She exhibits decreased range of motion and swelling. Tenderness (anterior shin pain). No lateral malleolus, no medial malleolus, no AITFL, no CF ligament, no posterior TFL and no proximal fibula tenderness found. Achilles tendon exhibits no pain, no defect and normal Paniagua's test results.      Right foot: No bony tenderness.      Left foot: Swelling present.      Comments: Biomechanical exam: There is equinus deformity bilateral with decreased dorsiflexion at the ankle joint bilateral. No tenderness with compression of heel. Negative tinels sign. Gait analysis reveals excessive pronation through midstance and propulsion with early heel off. Shoes reveals lateral heel counter wear bilateral     Decreased first MPJ range of motion both weightbearing and nonweightbearing, no crepitus observed the first MP joint, + dorsal flag sign. Mild  bunion deformity is observed .    Patient has hammertoes of digits 2-5 bilateral partially reducible without symptom today.     Skin:     General: Skin is warm and dry.      Coloration: Skin is not pale.      Findings: Wound present. No bruising, burn, laceration or rash.      Nails: There is no clubbing.        Comments: Ulcer location: sub 1st MTPJ, left  Measurements  :0.1x0.1x0.1  cm   Signs of infection: local edema   Drainage: Sanguinous  Purulence: no  Crepitus/fluctuance: no  Periwound: Reddened, Macerated, Calloused  Base: Granular/ thin epithelial tissue    Toenails 1-5 bilaterally are thickened by 2-3 mm, discolored/yellowed, dystrophic, brittle with subungual debris.    Neurological:      Sensory: No sensory deficit.      Motor: No tremor, atrophy or abnormal muscle tone.      Deep Tendon Reflexes: Reflexes are normal and symmetric.      Comments: Paresthesias, and hyperesthesia bilateral feet at toes with no clearly identified trigger or source.    Independence-Gilberto 5.07 monofilament is intact bilateral feet. Sharp/dull sensation is also intact Bilateral feet.   Psychiatric:         Attention and Perception: She is attentive.         Mood and Affect: Mood is not anxious. Affect is not inappropriate.         Speech: She is communicative. Speech is not slurred.         Behavior: Behavior is not combative.       12/23/2020 12/02/2020 11/24/2020 11/18/2020 11/11/2020                  Assessment:       Encounter Diagnoses   Name Primary?    Type II diabetes mellitus with neurological manifestations Yes    Pre-ulcerative calluses     History of diabetic ulcer of foot     Hallux limitus, acquired, right     Hallux limitus, acquired, left     Hammer toes of both feet     Hallux abducto valgus, left          Plan:     Problem List Items Addressed This Visit     History of diabetic ulcer of foot      Other Visit Diagnoses     Type II diabetes mellitus with neurological manifestations    -  Primary    Pre-ulcerative calluses        Hallux limitus, acquired, right        Hallux limitus, acquired, left        Hammer toes of both feet        Hallux abducto valgus, left                  I counseled the patient on her conditions, their implications and medical management.      Greater than 50% of this visit spent on counseling and  coordination of care.    Education about the diabetic foot, neuropathy, and prevention of limb loss.    Discussed wound healing cycle, skin integrity, ways to care for skin.Counseled patient on the effects of smoking and  high blood glucose on healing. She verbalizes understanding that it can increase the chances of delayed healing and this prolonged exposure leads to infection or progression of infection which subsequently can result in loss of limb.    Adequate vitamin supplementation, protein intake, and hydration - discussed with patient    The wound is cleansed of foreign material as much as possible and the base inspected for bone or abscess.      I am concerned about the amount of friction and pressure to the previous wound site.  Strongly advised patient against use of slide on style shoes.  [employer] shoe dispensed.  Area painted with Betadine and a U pad placed    Advised patient to continue to offload the site and paint with Betadine daily.  She is to inspect the site daily    Follow-up: 2-4 week but should call [facility] immediately if any signs of infection, such as fever, chills, sweats, increased redness or pain.    Short-term goals include maintaining good offloading and minimizing bioburden, promoting granulation and epithelialization to healing.  Long-term goals include keeping the wound healed by good offloading and medical management under the direction of internist.    Shoe inspection. Diabetic Foot Education. Patient reminded of the importance of good nutrition and blood sugar control to help prevent podiatric complications of diabetes. Patient instructed on proper foot hygeine. We discussed wearing proper shoe gear, daily foot inspections, never walking without protective shoe gear, never putting sharp instruments to feet.          Procedures

## 2021-01-05 ENCOUNTER — TELEPHONE (OUTPATIENT)
Dept: SMOKING CESSATION | Facility: CLINIC | Age: 51
End: 2021-01-05

## 2021-01-11 DIAGNOSIS — B35.9 TINEA: ICD-10-CM

## 2021-01-11 RX ORDER — NYSTATIN 100000 [USP'U]/G
POWDER TOPICAL
Qty: 60 G | Refills: 1 | Status: ON HOLD | OUTPATIENT
Start: 2021-01-11 | End: 2021-02-20 | Stop reason: HOSPADM

## 2021-01-13 ENCOUNTER — HOSPITAL ENCOUNTER (EMERGENCY)
Facility: HOSPITAL | Age: 51
Discharge: HOME | End: 2021-01-13
Attending: EMERGENCY MEDICINE
Payer: MEDICAID

## 2021-01-13 ENCOUNTER — OFFICE VISIT (OUTPATIENT)
Dept: PODIATRY | Facility: CLINIC | Age: 51
End: 2021-01-13
Payer: MEDICAID

## 2021-01-13 VITALS
HEIGHT: 66 IN | SYSTOLIC BLOOD PRESSURE: 136 MMHG | DIASTOLIC BLOOD PRESSURE: 87 MMHG | WEIGHT: 227 LBS | BODY MASS INDEX: 36.48 KG/M2

## 2021-01-13 VITALS
RESPIRATION RATE: 18 BRPM | WEIGHT: 230 LBS | HEART RATE: 96 BPM | BODY MASS INDEX: 39.27 KG/M2 | OXYGEN SATURATION: 96 % | DIASTOLIC BLOOD PRESSURE: 53 MMHG | SYSTOLIC BLOOD PRESSURE: 116 MMHG | HEIGHT: 64 IN | TEMPERATURE: 98 F

## 2021-01-13 DIAGNOSIS — G44.209 TENSION HEADACHE: Primary | ICD-10-CM

## 2021-01-13 DIAGNOSIS — E11.49 TYPE II DIABETES MELLITUS WITH NEUROLOGICAL MANIFESTATIONS: Primary | ICD-10-CM

## 2021-01-13 DIAGNOSIS — L97.422 LEFT MIDFOOT ULCER, WITH FAT LAYER EXPOSED: ICD-10-CM

## 2021-01-13 LAB
ALBUMIN SERPL BCP-MCNC: 3.7 G/DL (ref 3.5–5.2)
ALP SERPL-CCNC: 96 U/L (ref 55–135)
ALT SERPL W/O P-5'-P-CCNC: 17 U/L (ref 10–44)
ANION GAP SERPL CALC-SCNC: 13 MMOL/L (ref 8–16)
AST SERPL-CCNC: 19 U/L (ref 10–40)
BASOPHILS # BLD AUTO: 0.09 K/UL (ref 0–0.2)
BASOPHILS NFR BLD: 0.6 % (ref 0–1.9)
BILIRUB SERPL-MCNC: 0.2 MG/DL (ref 0.1–1)
BUN SERPL-MCNC: 9 MG/DL (ref 6–20)
CALCIUM SERPL-MCNC: 9.1 MG/DL (ref 8.7–10.5)
CHLORIDE SERPL-SCNC: 97 MMOL/L (ref 95–110)
CO2 SERPL-SCNC: 24 MMOL/L (ref 23–29)
CREAT SERPL-MCNC: 0.9 MG/DL (ref 0.5–1.4)
CTP QC/QA: YES
DIFFERENTIAL METHOD: ABNORMAL
EOSINOPHIL # BLD AUTO: 0.3 K/UL (ref 0–0.5)
EOSINOPHIL NFR BLD: 2.1 % (ref 0–8)
ERYTHROCYTE [DISTWIDTH] IN BLOOD BY AUTOMATED COUNT: 15.9 % (ref 11.5–14.5)
EST. GFR  (AFRICAN AMERICAN): >60 ML/MIN/1.73 M^2
EST. GFR  (NON AFRICAN AMERICAN): >60 ML/MIN/1.73 M^2
GLUCOSE SERPL-MCNC: 278 MG/DL (ref 70–110)
HCT VFR BLD AUTO: 36 % (ref 37–48.5)
HGB BLD-MCNC: 12 G/DL (ref 12–16)
IMM GRANULOCYTES # BLD AUTO: 0.12 K/UL (ref 0–0.04)
IMM GRANULOCYTES NFR BLD AUTO: 0.8 % (ref 0–0.5)
LYMPHOCYTES # BLD AUTO: 3.4 K/UL (ref 1–4.8)
LYMPHOCYTES NFR BLD: 23.4 % (ref 18–48)
MCH RBC QN AUTO: 27.8 PG (ref 27–31)
MCHC RBC AUTO-ENTMCNC: 33.3 G/DL (ref 32–36)
MCV RBC AUTO: 84 FL (ref 82–98)
MONOCYTES # BLD AUTO: 1.1 K/UL (ref 0.3–1)
MONOCYTES NFR BLD: 7.5 % (ref 4–15)
NEUTROPHILS # BLD AUTO: 9.5 K/UL (ref 1.8–7.7)
NEUTROPHILS NFR BLD: 65.6 % (ref 38–73)
NRBC BLD-RTO: 0 /100 WBC
PLATELET # BLD AUTO: 459 K/UL (ref 150–350)
PMV BLD AUTO: 9.9 FL (ref 9.2–12.9)
POTASSIUM SERPL-SCNC: 4 MMOL/L (ref 3.5–5.1)
PROT SERPL-MCNC: 6.7 G/DL (ref 6–8.4)
RBC # BLD AUTO: 4.31 M/UL (ref 4–5.4)
SARS-COV-2 RDRP RESP QL NAA+PROBE: NEGATIVE
SODIUM SERPL-SCNC: 134 MMOL/L (ref 136–145)
WBC # BLD AUTO: 14.41 K/UL (ref 3.9–12.7)

## 2021-01-13 PROCEDURE — 25000003 PHARM REV CODE 250: Performed by: EMERGENCY MEDICINE

## 2021-01-13 PROCEDURE — 11042 WOUND DEBRIDEMENT: ICD-10-PCS | Mod: S$PBB,,, | Performed by: PODIATRIST

## 2021-01-13 PROCEDURE — 80053 COMPREHEN METABOLIC PANEL: CPT

## 2021-01-13 PROCEDURE — 99284 EMERGENCY DEPT VISIT MOD MDM: CPT | Mod: 25

## 2021-01-13 PROCEDURE — 99999 PR PBB SHADOW E&M-EST. PATIENT-LVL V: CPT | Mod: PBBFAC,,, | Performed by: PODIATRIST

## 2021-01-13 PROCEDURE — 99499 UNLISTED E&M SERVICE: CPT | Mod: S$PBB,,, | Performed by: PODIATRIST

## 2021-01-13 PROCEDURE — 99999 PR PBB SHADOW E&M-EST. PATIENT-LVL V: ICD-10-PCS | Mod: PBBFAC,,, | Performed by: PODIATRIST

## 2021-01-13 PROCEDURE — 96374 THER/PROPH/DIAG INJ IV PUSH: CPT

## 2021-01-13 PROCEDURE — U0002 COVID-19 LAB TEST NON-CDC: HCPCS | Performed by: EMERGENCY MEDICINE

## 2021-01-13 PROCEDURE — 99215 OFFICE O/P EST HI 40 MIN: CPT | Mod: PBBFAC,25,27,PO | Performed by: PODIATRIST

## 2021-01-13 PROCEDURE — 99499 NO LOS: ICD-10-PCS | Mod: S$PBB,,, | Performed by: PODIATRIST

## 2021-01-13 PROCEDURE — 85025 COMPLETE CBC W/AUTO DIFF WBC: CPT

## 2021-01-13 PROCEDURE — 11042 DBRDMT SUBQ TIS 1ST 20SQCM/<: CPT | Mod: PBBFAC,PO | Performed by: PODIATRIST

## 2021-01-13 PROCEDURE — 63600175 PHARM REV CODE 636 W HCPCS: Performed by: EMERGENCY MEDICINE

## 2021-01-13 RX ORDER — DOXYCYCLINE HYCLATE 100 MG
100 TABLET ORAL 2 TIMES DAILY
Qty: 20 TABLET | Refills: 0 | Status: SHIPPED | OUTPATIENT
Start: 2021-01-13 | End: 2021-02-09

## 2021-01-13 RX ORDER — ONDANSETRON 2 MG/ML
4 INJECTION INTRAMUSCULAR; INTRAVENOUS
Status: COMPLETED | OUTPATIENT
Start: 2021-01-13 | End: 2021-01-13

## 2021-01-13 RX ORDER — ACETAMINOPHEN 500 MG
1000 TABLET ORAL EVERY 6 HOURS PRN
Qty: 60 TABLET | Refills: 0 | Status: ON HOLD | OUTPATIENT
Start: 2021-01-13 | End: 2021-02-20 | Stop reason: HOSPADM

## 2021-01-13 RX ORDER — ACETAMINOPHEN 500 MG
1000 TABLET ORAL
Status: COMPLETED | OUTPATIENT
Start: 2021-01-13 | End: 2021-01-13

## 2021-01-13 RX ORDER — ONDANSETRON 4 MG/1
4 TABLET, ORALLY DISINTEGRATING ORAL EVERY 8 HOURS PRN
Qty: 20 TABLET | Refills: 0 | Status: ON HOLD | OUTPATIENT
Start: 2021-01-13 | End: 2021-02-20 | Stop reason: HOSPADM

## 2021-01-13 RX ADMIN — ACETAMINOPHEN 1000 MG: 500 TABLET ORAL at 10:01

## 2021-01-13 RX ADMIN — ONDANSETRON 4 MG: 2 INJECTION INTRAMUSCULAR; INTRAVENOUS at 10:01

## 2021-01-22 ENCOUNTER — TELEPHONE (OUTPATIENT)
Dept: VASCULAR SURGERY | Facility: CLINIC | Age: 51
End: 2021-01-22

## 2021-01-26 ENCOUNTER — TELEPHONE (OUTPATIENT)
Dept: FAMILY MEDICINE | Facility: CLINIC | Age: 51
End: 2021-01-26

## 2021-01-26 ENCOUNTER — HOSPITAL ENCOUNTER (EMERGENCY)
Facility: HOSPITAL | Age: 51
Discharge: PSYCHIATRIC OR SUBSTANCE USE FACILITY | End: 2021-01-27
Attending: EMERGENCY MEDICINE
Payer: MEDICAID

## 2021-01-26 DIAGNOSIS — F30.9 MANIC EPISODE: Primary | ICD-10-CM

## 2021-01-26 DIAGNOSIS — Z00.8 MEDICAL CLEARANCE FOR PSYCHIATRIC ADMISSION: ICD-10-CM

## 2021-01-26 DIAGNOSIS — J44.9 CHRONIC OBSTRUCTIVE PULMONARY DISEASE, UNSPECIFIED COPD TYPE: ICD-10-CM

## 2021-01-26 DIAGNOSIS — R82.71 BACTERIURIA: ICD-10-CM

## 2021-01-26 DIAGNOSIS — F31.12 BIPOLAR 1 DISORDER, MANIC, MODERATE: ICD-10-CM

## 2021-01-26 LAB
ALBUMIN SERPL BCP-MCNC: 3.9 G/DL (ref 3.5–5.2)
ALP SERPL-CCNC: 91 U/L (ref 55–135)
ALT SERPL W/O P-5'-P-CCNC: 21 U/L (ref 10–44)
AMPHET+METHAMPHET UR QL: NORMAL
ANION GAP SERPL CALC-SCNC: 11 MMOL/L (ref 8–16)
ANION GAP SERPL CALC-SCNC: 14 MMOL/L (ref 8–16)
APAP SERPL-MCNC: <3 UG/ML (ref 10–20)
AST SERPL-CCNC: 25 U/L (ref 10–40)
BACTERIA #/AREA URNS HPF: ABNORMAL /HPF
BARBITURATES UR QL SCN>200 NG/ML: NEGATIVE
BASOPHILS # BLD AUTO: 0.05 K/UL (ref 0–0.2)
BASOPHILS NFR BLD: 0.4 % (ref 0–1.9)
BENZODIAZ UR QL SCN>200 NG/ML: NEGATIVE
BILIRUB SERPL-MCNC: 0.5 MG/DL (ref 0.1–1)
BILIRUB UR QL STRIP: NEGATIVE
BNP SERPL-MCNC: 16 PG/ML (ref 0–99)
BUN SERPL-MCNC: 5 MG/DL (ref 6–20)
BUN SERPL-MCNC: 5 MG/DL (ref 6–20)
BZE UR QL SCN: NEGATIVE
CALCIUM SERPL-MCNC: 8 MG/DL (ref 8.7–10.5)
CALCIUM SERPL-MCNC: 8.7 MG/DL (ref 8.7–10.5)
CANNABINOIDS UR QL SCN: NEGATIVE
CARBAMAZEPINE SERPL-MCNC: 3.6 UG/ML (ref 4–12)
CHLORIDE SERPL-SCNC: 89 MMOL/L (ref 95–110)
CHLORIDE SERPL-SCNC: 95 MMOL/L (ref 95–110)
CLARITY UR: CLEAR
CO2 SERPL-SCNC: 23 MMOL/L (ref 23–29)
CO2 SERPL-SCNC: 24 MMOL/L (ref 23–29)
COLOR UR: YELLOW
CREAT SERPL-MCNC: 0.6 MG/DL (ref 0.5–1.4)
CREAT SERPL-MCNC: 0.7 MG/DL (ref 0.5–1.4)
CREAT UR-MCNC: 26.7 MG/DL (ref 15–325)
CTP QC/QA: YES
DIFFERENTIAL METHOD: ABNORMAL
EOSINOPHIL # BLD AUTO: 0 K/UL (ref 0–0.5)
EOSINOPHIL NFR BLD: 0.3 % (ref 0–8)
ERYTHROCYTE [DISTWIDTH] IN BLOOD BY AUTOMATED COUNT: 15.1 % (ref 11.5–14.5)
EST. GFR  (AFRICAN AMERICAN): >60 ML/MIN/1.73 M^2
EST. GFR  (AFRICAN AMERICAN): >60 ML/MIN/1.73 M^2
EST. GFR  (NON AFRICAN AMERICAN): >60 ML/MIN/1.73 M^2
EST. GFR  (NON AFRICAN AMERICAN): >60 ML/MIN/1.73 M^2
ETHANOL SERPL-MCNC: <10 MG/DL
GLUCOSE SERPL-MCNC: 118 MG/DL (ref 70–110)
GLUCOSE SERPL-MCNC: 202 MG/DL (ref 70–110)
GLUCOSE UR QL STRIP: NEGATIVE
HCT VFR BLD AUTO: 35.9 % (ref 37–48.5)
HGB BLD-MCNC: 12.3 G/DL (ref 12–16)
HGB UR QL STRIP: NEGATIVE
IMM GRANULOCYTES # BLD AUTO: 0.05 K/UL (ref 0–0.04)
IMM GRANULOCYTES NFR BLD AUTO: 0.4 % (ref 0–0.5)
KETONES UR QL STRIP: NEGATIVE
LEUKOCYTE ESTERASE UR QL STRIP: ABNORMAL
LYMPHOCYTES # BLD AUTO: 3.5 K/UL (ref 1–4.8)
LYMPHOCYTES NFR BLD: 29.5 % (ref 18–48)
MCH RBC QN AUTO: 27.4 PG (ref 27–31)
MCHC RBC AUTO-ENTMCNC: 34.3 G/DL (ref 32–36)
MCV RBC AUTO: 80 FL (ref 82–98)
METHADONE UR QL SCN>300 NG/ML: NEGATIVE
MICROSCOPIC COMMENT: ABNORMAL
MONOCYTES # BLD AUTO: 1.2 K/UL (ref 0.3–1)
MONOCYTES NFR BLD: 9.7 % (ref 4–15)
NEUTROPHILS # BLD AUTO: 7.1 K/UL (ref 1.8–7.7)
NEUTROPHILS NFR BLD: 59.7 % (ref 38–73)
NITRITE UR QL STRIP: NEGATIVE
NRBC BLD-RTO: 0 /100 WBC
OPIATES UR QL SCN: NEGATIVE
PCP UR QL SCN>25 NG/ML: NEGATIVE
PH UR STRIP: 7 [PH] (ref 5–8)
PLATELET # BLD AUTO: 495 K/UL (ref 150–350)
PMV BLD AUTO: 9.5 FL (ref 9.2–12.9)
POCT GLUCOSE: 210 MG/DL (ref 70–110)
POTASSIUM SERPL-SCNC: 3.6 MMOL/L (ref 3.5–5.1)
POTASSIUM SERPL-SCNC: 4 MMOL/L (ref 3.5–5.1)
PROT SERPL-MCNC: 6.5 G/DL (ref 6–8.4)
PROT UR QL STRIP: ABNORMAL
RBC # BLD AUTO: 4.49 M/UL (ref 4–5.4)
SARS-COV-2 RDRP RESP QL NAA+PROBE: NEGATIVE
SODIUM SERPL-SCNC: 126 MMOL/L (ref 136–145)
SODIUM SERPL-SCNC: 130 MMOL/L (ref 136–145)
SP GR UR STRIP: <1.005 (ref 1–1.03)
SQUAMOUS #/AREA URNS HPF: 3 /HPF
TOXICOLOGY INFORMATION: NORMAL
TSH SERPL DL<=0.005 MIU/L-ACNC: 1.66 UIU/ML (ref 0.4–4)
URN SPEC COLLECT METH UR: ABNORMAL
UROBILINOGEN UR STRIP-ACNC: NEGATIVE EU/DL
WBC # BLD AUTO: 11.85 K/UL (ref 3.9–12.7)
WBC #/AREA URNS HPF: 5 /HPF (ref 0–5)

## 2021-01-26 PROCEDURE — 83880 ASSAY OF NATRIURETIC PEPTIDE: CPT

## 2021-01-26 PROCEDURE — 80143 DRUG ASSAY ACETAMINOPHEN: CPT

## 2021-01-26 PROCEDURE — 99285 EMERGENCY DEPT VISIT HI MDM: CPT | Mod: 25

## 2021-01-26 PROCEDURE — 82962 GLUCOSE BLOOD TEST: CPT

## 2021-01-26 PROCEDURE — U0002 COVID-19 LAB TEST NON-CDC: HCPCS | Performed by: EMERGENCY MEDICINE

## 2021-01-26 PROCEDURE — 82077 ASSAY SPEC XCP UR&BREATH IA: CPT

## 2021-01-26 PROCEDURE — 80156 ASSAY CARBAMAZEPINE TOTAL: CPT

## 2021-01-26 PROCEDURE — 80307 DRUG TEST PRSMV CHEM ANLYZR: CPT

## 2021-01-26 PROCEDURE — 25000003 PHARM REV CODE 250: Performed by: EMERGENCY MEDICINE

## 2021-01-26 PROCEDURE — 81000 URINALYSIS NONAUTO W/SCOPE: CPT | Mod: 59

## 2021-01-26 PROCEDURE — 85025 COMPLETE CBC W/AUTO DIFF WBC: CPT

## 2021-01-26 PROCEDURE — 80053 COMPREHEN METABOLIC PANEL: CPT

## 2021-01-26 PROCEDURE — 96361 HYDRATE IV INFUSION ADD-ON: CPT

## 2021-01-26 PROCEDURE — 96375 TX/PRO/DX INJ NEW DRUG ADDON: CPT

## 2021-01-26 PROCEDURE — 93010 ELECTROCARDIOGRAM REPORT: CPT | Mod: ,,, | Performed by: INTERNAL MEDICINE

## 2021-01-26 PROCEDURE — 80048 BASIC METABOLIC PNL TOTAL CA: CPT

## 2021-01-26 PROCEDURE — 93005 ELECTROCARDIOGRAM TRACING: CPT

## 2021-01-26 PROCEDURE — 93010 EKG 12-LEAD: ICD-10-PCS | Mod: ,,, | Performed by: INTERNAL MEDICINE

## 2021-01-26 PROCEDURE — 96374 THER/PROPH/DIAG INJ IV PUSH: CPT

## 2021-01-26 PROCEDURE — 84443 ASSAY THYROID STIM HORMONE: CPT

## 2021-01-26 RX ORDER — HYDROCODONE BITARTRATE AND ACETAMINOPHEN 5; 325 MG/1; MG/1
1 TABLET ORAL
Status: DISCONTINUED | OUTPATIENT
Start: 2021-01-26 | End: 2021-01-27 | Stop reason: HOSPADM

## 2021-01-26 RX ORDER — METOPROLOL TARTRATE 50 MG/1
50 TABLET ORAL
Status: DISCONTINUED | OUTPATIENT
Start: 2021-01-26 | End: 2021-01-26

## 2021-01-26 RX ORDER — FUROSEMIDE 20 MG/1
20 TABLET ORAL
Status: DISCONTINUED | OUTPATIENT
Start: 2021-01-26 | End: 2021-01-26

## 2021-01-26 RX ORDER — LISINOPRIL 20 MG/1
20 TABLET ORAL
Status: DISCONTINUED | OUTPATIENT
Start: 2021-01-26 | End: 2021-01-26

## 2021-01-26 RX ORDER — GABAPENTIN 300 MG/1
300 CAPSULE ORAL 3 TIMES DAILY
Status: DISCONTINUED | OUTPATIENT
Start: 2021-01-26 | End: 2021-01-27 | Stop reason: HOSPADM

## 2021-01-26 RX ADMIN — SODIUM CHLORIDE 1000 ML: 9 INJECTION, SOLUTION INTRAVENOUS at 08:01

## 2021-01-27 VITALS
TEMPERATURE: 98 F | OXYGEN SATURATION: 98 % | SYSTOLIC BLOOD PRESSURE: 122 MMHG | WEIGHT: 230 LBS | HEIGHT: 65 IN | BODY MASS INDEX: 38.32 KG/M2 | RESPIRATION RATE: 17 BRPM | DIASTOLIC BLOOD PRESSURE: 63 MMHG | HEART RATE: 89 BPM

## 2021-01-27 PROCEDURE — 63600175 PHARM REV CODE 636 W HCPCS: Performed by: EMERGENCY MEDICINE

## 2021-01-27 RX ORDER — DIPHENHYDRAMINE HYDROCHLORIDE 50 MG/ML
25 INJECTION INTRAMUSCULAR; INTRAVENOUS
Status: COMPLETED | OUTPATIENT
Start: 2021-01-27 | End: 2021-01-27

## 2021-01-27 RX ORDER — IPRATROPIUM BROMIDE AND ALBUTEROL SULFATE 2.5; .5 MG/3ML; MG/3ML
3 SOLUTION RESPIRATORY (INHALATION)
Status: DISCONTINUED | OUTPATIENT
Start: 2021-01-27 | End: 2021-01-27 | Stop reason: HOSPADM

## 2021-01-27 RX ORDER — LORAZEPAM 2 MG/ML
1 INJECTION INTRAMUSCULAR
Status: COMPLETED | OUTPATIENT
Start: 2021-01-27 | End: 2021-01-27

## 2021-01-27 RX ORDER — HALOPERIDOL 5 MG/ML
5 INJECTION INTRAMUSCULAR
Status: COMPLETED | OUTPATIENT
Start: 2021-01-27 | End: 2021-01-27

## 2021-01-27 RX ADMIN — DIPHENHYDRAMINE HYDROCHLORIDE 25 MG: 50 INJECTION INTRAMUSCULAR; INTRAVENOUS at 01:01

## 2021-01-27 RX ADMIN — HALOPERIDOL LACTATE 5 MG: 5 INJECTION, SOLUTION INTRAMUSCULAR at 01:01

## 2021-01-27 RX ADMIN — LORAZEPAM 1 MG: 2 INJECTION, SOLUTION INTRAMUSCULAR; INTRAVENOUS at 01:01

## 2021-01-28 ENCOUNTER — TELEPHONE (OUTPATIENT)
Dept: FAMILY MEDICINE | Facility: CLINIC | Age: 51
End: 2021-01-28

## 2021-02-04 ENCOUNTER — TELEPHONE (OUTPATIENT)
Dept: FAMILY MEDICINE | Facility: CLINIC | Age: 51
End: 2021-02-04

## 2021-02-09 ENCOUNTER — OFFICE VISIT (OUTPATIENT)
Dept: FAMILY MEDICINE | Facility: CLINIC | Age: 51
End: 2021-02-09
Payer: MEDICAID

## 2021-02-09 ENCOUNTER — OFFICE VISIT (OUTPATIENT)
Dept: PODIATRY | Facility: CLINIC | Age: 51
End: 2021-02-09
Payer: MEDICAID

## 2021-02-09 ENCOUNTER — HOSPITAL ENCOUNTER (OUTPATIENT)
Dept: RADIOLOGY | Facility: HOSPITAL | Age: 51
Discharge: HOME | End: 2021-02-09
Attending: PODIATRIST
Payer: MEDICAID

## 2021-02-09 VITALS
DIASTOLIC BLOOD PRESSURE: 51 MMHG | OXYGEN SATURATION: 96 % | SYSTOLIC BLOOD PRESSURE: 106 MMHG | HEART RATE: 87 BPM | BODY MASS INDEX: 36.14 KG/M2 | HEIGHT: 65 IN | WEIGHT: 216.94 LBS

## 2021-02-09 VITALS
BODY MASS INDEX: 35.99 KG/M2 | WEIGHT: 216 LBS | HEIGHT: 65 IN | DIASTOLIC BLOOD PRESSURE: 72 MMHG | SYSTOLIC BLOOD PRESSURE: 136 MMHG

## 2021-02-09 DIAGNOSIS — I10 ESSENTIAL HYPERTENSION: ICD-10-CM

## 2021-02-09 DIAGNOSIS — L02.612 FOOT ABSCESS, LEFT: ICD-10-CM

## 2021-02-09 DIAGNOSIS — M54.42 CHRONIC BILATERAL LOW BACK PAIN WITH LEFT-SIDED SCIATICA: ICD-10-CM

## 2021-02-09 DIAGNOSIS — E11.49 TYPE II DIABETES MELLITUS WITH NEUROLOGICAL MANIFESTATIONS: ICD-10-CM

## 2021-02-09 DIAGNOSIS — E11.49 TYPE II DIABETES MELLITUS WITH NEUROLOGICAL MANIFESTATIONS: Primary | ICD-10-CM

## 2021-02-09 DIAGNOSIS — E11.42 TYPE 2 DIABETES MELLITUS WITH DIABETIC POLYNEUROPATHY, WITHOUT LONG-TERM CURRENT USE OF INSULIN: ICD-10-CM

## 2021-02-09 DIAGNOSIS — L97.422 LEFT MIDFOOT ULCER, WITH FAT LAYER EXPOSED: ICD-10-CM

## 2021-02-09 DIAGNOSIS — Z86.718 HISTORY OF DVT (DEEP VEIN THROMBOSIS): ICD-10-CM

## 2021-02-09 DIAGNOSIS — F31.9 BIPOLAR 1 DISORDER: Primary | ICD-10-CM

## 2021-02-09 DIAGNOSIS — G89.29 CHRONIC BILATERAL LOW BACK PAIN WITH LEFT-SIDED SCIATICA: ICD-10-CM

## 2021-02-09 PROCEDURE — 87076 CULTURE ANAEROBE IDENT EACH: CPT

## 2021-02-09 PROCEDURE — 99999 PR PBB SHADOW E&M-EST. PATIENT-LVL V: CPT | Mod: PBBFAC,,, | Performed by: PODIATRIST

## 2021-02-09 PROCEDURE — 73630 X-RAY EXAM OF FOOT: CPT | Mod: 26,LT,, | Performed by: RADIOLOGY

## 2021-02-09 PROCEDURE — 99214 OFFICE O/P EST MOD 30 MIN: CPT | Mod: S$PBB,,, | Performed by: INTERNAL MEDICINE

## 2021-02-09 PROCEDURE — 99999 PR PBB SHADOW E&M-EST. PATIENT-LVL V: CPT | Mod: PBBFAC,,, | Performed by: INTERNAL MEDICINE

## 2021-02-09 PROCEDURE — 99215 OFFICE O/P EST HI 40 MIN: CPT | Mod: PBBFAC,PN,25 | Performed by: INTERNAL MEDICINE

## 2021-02-09 PROCEDURE — 87070 CULTURE OTHR SPECIMN AEROBIC: CPT

## 2021-02-09 PROCEDURE — 10060 I&D ABSCESS SIMPLE/SINGLE: CPT | Mod: PBBFAC,PO | Performed by: PODIATRIST

## 2021-02-09 PROCEDURE — 99999 PR PBB SHADOW E&M-EST. PATIENT-LVL V: ICD-10-PCS | Mod: PBBFAC,,, | Performed by: INTERNAL MEDICINE

## 2021-02-09 PROCEDURE — 87075 CULTR BACTERIA EXCEPT BLOOD: CPT

## 2021-02-09 PROCEDURE — 73630 X-RAY EXAM OF FOOT: CPT | Mod: TC,FY,PO,LT

## 2021-02-09 PROCEDURE — 10060 INCISION AND DRAINAGE: ICD-10-PCS | Mod: S$PBB,,, | Performed by: PODIATRIST

## 2021-02-09 PROCEDURE — 99999 PR PBB SHADOW E&M-EST. PATIENT-LVL V: ICD-10-PCS | Mod: PBBFAC,,, | Performed by: PODIATRIST

## 2021-02-09 PROCEDURE — 99214 PR OFFICE/OUTPT VISIT, EST, LEVL IV, 30-39 MIN: ICD-10-PCS | Mod: S$PBB,,, | Performed by: INTERNAL MEDICINE

## 2021-02-09 PROCEDURE — 99215 OFFICE O/P EST HI 40 MIN: CPT | Mod: PBBFAC,25,27,PO | Performed by: PODIATRIST

## 2021-02-09 PROCEDURE — 99214 OFFICE O/P EST MOD 30 MIN: CPT | Mod: 25,S$PBB,, | Performed by: PODIATRIST

## 2021-02-09 PROCEDURE — 73630 XR FOOT COMPLETE 3 VIEW LEFT: ICD-10-PCS | Mod: 26,LT,, | Performed by: RADIOLOGY

## 2021-02-09 PROCEDURE — 99214 PR OFFICE/OUTPT VISIT, EST, LEVL IV, 30-39 MIN: ICD-10-PCS | Mod: 25,S$PBB,, | Performed by: PODIATRIST

## 2021-02-09 RX ORDER — DIVALPROEX SODIUM 500 MG/1
500 TABLET, DELAYED RELEASE ORAL 2 TIMES DAILY
Status: ON HOLD | COMMUNITY
Start: 2021-02-03 | End: 2021-02-20 | Stop reason: HOSPADM

## 2021-02-09 RX ORDER — ESCITALOPRAM OXALATE 20 MG/1
TABLET ORAL
Status: ON HOLD | COMMUNITY
Start: 2021-02-03 | End: 2021-02-20 | Stop reason: HOSPADM

## 2021-02-09 RX ORDER — CLONIDINE HYDROCHLORIDE 0.1 MG/1
0.1 TABLET ORAL NIGHTLY
Status: ON HOLD | COMMUNITY
Start: 2021-02-03 | End: 2021-02-20 | Stop reason: HOSPADM

## 2021-02-09 RX ORDER — TRIAMCINOLONE ACETONIDE 1 MG/G
OINTMENT TOPICAL
Status: ON HOLD | COMMUNITY
Start: 2020-12-28 | End: 2021-02-20 | Stop reason: HOSPADM

## 2021-02-09 RX ORDER — DOXYCYCLINE HYCLATE 100 MG
100 TABLET ORAL 2 TIMES DAILY
Qty: 20 TABLET | Refills: 0 | Status: ON HOLD | OUTPATIENT
Start: 2021-02-09 | End: 2021-02-20 | Stop reason: HOSPADM

## 2021-02-09 RX ORDER — MIRTAZAPINE 45 MG/1
45 TABLET, FILM COATED ORAL NIGHTLY
Status: ON HOLD | COMMUNITY
Start: 2021-02-03 | End: 2021-02-20 | Stop reason: HOSPADM

## 2021-02-09 RX ORDER — CYCLOBENZAPRINE HYDROCHLORIDE 7.5 MG/1
7.5 TABLET, FILM COATED ORAL 3 TIMES DAILY PRN
Qty: 30 TABLET | Refills: 1 | Status: ON HOLD | OUTPATIENT
Start: 2021-02-09 | End: 2021-02-20 | Stop reason: HOSPADM

## 2021-02-10 ENCOUNTER — TELEPHONE (OUTPATIENT)
Dept: FAMILY MEDICINE | Facility: CLINIC | Age: 51
End: 2021-02-10

## 2021-02-10 ENCOUNTER — HOSPITAL ENCOUNTER (OUTPATIENT)
Facility: HOSPITAL | Age: 51
Discharge: PSYCHIATRIC OR SUBSTANCE USE FACILITY | End: 2021-02-13
Attending: EMERGENCY MEDICINE | Admitting: HOSPITALIST
Payer: MEDICAID

## 2021-02-10 DIAGNOSIS — F29 ACUTE EXACERBATION OF PSYCHOSIS: ICD-10-CM

## 2021-02-10 DIAGNOSIS — E11.621 DIABETIC ULCER OF LEFT MIDFOOT ASSOCIATED WITH TYPE 2 DIABETES MELLITUS, LIMITED TO BREAKDOWN OF SKIN: ICD-10-CM

## 2021-02-10 DIAGNOSIS — R10.11 RIGHT UPPER QUADRANT ABDOMINAL PAIN: ICD-10-CM

## 2021-02-10 DIAGNOSIS — Z86.31 HISTORY OF DIABETIC ULCER OF FOOT: ICD-10-CM

## 2021-02-10 DIAGNOSIS — E87.1 HYPONATREMIA: ICD-10-CM

## 2021-02-10 DIAGNOSIS — L03.116 CELLULITIS OF LEFT FOOT: Primary | ICD-10-CM

## 2021-02-10 DIAGNOSIS — F22 PARANOIA: ICD-10-CM

## 2021-02-10 DIAGNOSIS — L97.421 DIABETIC ULCER OF LEFT MIDFOOT ASSOCIATED WITH TYPE 2 DIABETES MELLITUS, LIMITED TO BREAKDOWN OF SKIN: ICD-10-CM

## 2021-02-10 DIAGNOSIS — R07.9 CHEST PAIN: ICD-10-CM

## 2021-02-10 LAB
ALBUMIN SERPL BCP-MCNC: 3.5 G/DL (ref 3.5–5.2)
ALP SERPL-CCNC: 91 U/L (ref 55–135)
ALT SERPL W/O P-5'-P-CCNC: 19 U/L (ref 10–44)
AMPHET+METHAMPHET UR QL: NEGATIVE
ANION GAP SERPL CALC-SCNC: 14 MMOL/L (ref 8–16)
APAP SERPL-MCNC: <3 UG/ML (ref 10–20)
AST SERPL-CCNC: 20 U/L (ref 10–40)
BARBITURATES UR QL SCN>200 NG/ML: NEGATIVE
BASOPHILS # BLD AUTO: 0.08 K/UL (ref 0–0.2)
BASOPHILS NFR BLD: 0.7 % (ref 0–1.9)
BENZODIAZ UR QL SCN>200 NG/ML: NEGATIVE
BILIRUB SERPL-MCNC: 0.3 MG/DL (ref 0.1–1)
BILIRUB UR QL STRIP: NEGATIVE
BILIRUB UR QL STRIP: NEGATIVE
BNP SERPL-MCNC: 23 PG/ML (ref 0–99)
BUN SERPL-MCNC: 8 MG/DL (ref 6–20)
BZE UR QL SCN: NEGATIVE
CALCIUM SERPL-MCNC: 9.1 MG/DL (ref 8.7–10.5)
CANNABINOIDS UR QL SCN: NEGATIVE
CHLORIDE SERPL-SCNC: 90 MMOL/L (ref 95–110)
CLARITY UR REFRACT.AUTO: CLEAR
CLARITY UR REFRACT.AUTO: CLEAR
CO2 SERPL-SCNC: 25 MMOL/L (ref 23–29)
COLOR UR AUTO: ABNORMAL
COLOR UR AUTO: ABNORMAL
CREAT SERPL-MCNC: 0.6 MG/DL (ref 0.5–1.4)
CREAT UR-MCNC: 16 MG/DL (ref 15–325)
CREAT UR-MCNC: 17 MG/DL (ref 15–325)
CREAT UR-MCNC: 17 MG/DL (ref 15–325)
CTP QC/QA: YES
DIFFERENTIAL METHOD: ABNORMAL
EOSINOPHIL # BLD AUTO: 0.1 K/UL (ref 0–0.5)
EOSINOPHIL NFR BLD: 1.2 % (ref 0–8)
ERYTHROCYTE [DISTWIDTH] IN BLOOD BY AUTOMATED COUNT: 15.9 % (ref 11.5–14.5)
EST. GFR  (AFRICAN AMERICAN): >60 ML/MIN/1.73 M^2
EST. GFR  (NON AFRICAN AMERICAN): >60 ML/MIN/1.73 M^2
ESTIMATED AVG GLUCOSE: 151 MG/DL (ref 68–131)
ETHANOL SERPL-MCNC: <10 MG/DL
ETHANOL UR-MCNC: <10 MG/DL
ETHANOL UR-MCNC: <10 MG/DL
GLUCOSE SERPL-MCNC: 117 MG/DL (ref 70–110)
GLUCOSE UR QL STRIP: NEGATIVE
GLUCOSE UR QL STRIP: NEGATIVE
HBA1C MFR BLD: 6.9 % (ref 4–5.6)
HCT VFR BLD AUTO: 33.2 % (ref 37–48.5)
HGB BLD-MCNC: 11.2 G/DL (ref 12–16)
HGB UR QL STRIP: NEGATIVE
HGB UR QL STRIP: NEGATIVE
IMM GRANULOCYTES # BLD AUTO: 0.07 K/UL (ref 0–0.04)
IMM GRANULOCYTES NFR BLD AUTO: 0.6 % (ref 0–0.5)
KETONES UR QL STRIP: ABNORMAL
KETONES UR QL STRIP: NEGATIVE
LACTATE SERPL-SCNC: 0.9 MMOL/L (ref 0.5–2.2)
LACTATE SERPL-SCNC: 1 MMOL/L (ref 0.5–2.2)
LEUKOCYTE ESTERASE UR QL STRIP: NEGATIVE
LEUKOCYTE ESTERASE UR QL STRIP: NEGATIVE
LYMPHOCYTES # BLD AUTO: 3 K/UL (ref 1–4.8)
LYMPHOCYTES NFR BLD: 25.2 % (ref 18–48)
MCH RBC QN AUTO: 27.5 PG (ref 27–31)
MCHC RBC AUTO-ENTMCNC: 33.7 G/DL (ref 32–36)
MCV RBC AUTO: 81 FL (ref 82–98)
METHADONE UR QL SCN>300 NG/ML: NEGATIVE
MONOCYTES # BLD AUTO: 1.4 K/UL (ref 0.3–1)
MONOCYTES NFR BLD: 11.7 % (ref 4–15)
NEUTROPHILS # BLD AUTO: 7.2 K/UL (ref 1.8–7.7)
NEUTROPHILS NFR BLD: 60.6 % (ref 38–73)
NITRITE UR QL STRIP: NEGATIVE
NITRITE UR QL STRIP: NEGATIVE
NRBC BLD-RTO: 0 /100 WBC
OPIATES UR QL SCN: NEGATIVE
PCP UR QL SCN>25 NG/ML: NEGATIVE
PH UR STRIP: 7 [PH] (ref 5–8)
PH UR STRIP: 8 [PH] (ref 5–8)
PLATELET # BLD AUTO: 493 K/UL (ref 150–350)
PMV BLD AUTO: 10 FL (ref 9.2–12.9)
POCT GLUCOSE: 131 MG/DL (ref 70–110)
POTASSIUM SERPL-SCNC: 3.8 MMOL/L (ref 3.5–5.1)
PROT SERPL-MCNC: 6.9 G/DL (ref 6–8.4)
PROT UR QL STRIP: NEGATIVE
PROT UR QL STRIP: NEGATIVE
RBC # BLD AUTO: 4.08 M/UL (ref 4–5.4)
SARS-COV-2 RDRP RESP QL NAA+PROBE: NEGATIVE
SODIUM SERPL-SCNC: 129 MMOL/L (ref 136–145)
SODIUM UR-SCNC: 30 MMOL/L (ref 20–250)
SP GR UR STRIP: 1 (ref 1–1.03)
SP GR UR STRIP: 1 (ref 1–1.03)
TOXICOLOGY INFORMATION: NORMAL
TROPONIN I SERPL DL<=0.01 NG/ML-MCNC: 0.02 NG/ML (ref 0–0.03)
TSH SERPL DL<=0.005 MIU/L-ACNC: 1.62 UIU/ML (ref 0.4–4)
URN SPEC COLLECT METH UR: ABNORMAL
URN SPEC COLLECT METH UR: ABNORMAL
VALPROATE SERPL-MCNC: <12.5 UG/ML (ref 50–100)
WBC # BLD AUTO: 11.84 K/UL (ref 3.9–12.7)

## 2021-02-10 PROCEDURE — 99205 OFFICE O/P NEW HI 60 MIN: CPT | Mod: AF,HB,, | Performed by: PSYCHIATRY & NEUROLOGY

## 2021-02-10 PROCEDURE — 83605 ASSAY OF LACTIC ACID: CPT | Mod: 91

## 2021-02-10 PROCEDURE — 36415 COLL VENOUS BLD VENIPUNCTURE: CPT

## 2021-02-10 PROCEDURE — 81003 URINALYSIS AUTO W/O SCOPE: CPT | Mod: 91,59

## 2021-02-10 PROCEDURE — 84443 ASSAY THYROID STIM HORMONE: CPT

## 2021-02-10 PROCEDURE — 25000003 PHARM REV CODE 250: Performed by: HOSPITALIST

## 2021-02-10 PROCEDURE — S0166 INJ OLANZAPINE 2.5MG: HCPCS | Performed by: EMERGENCY MEDICINE

## 2021-02-10 PROCEDURE — 80307 DRUG TEST PRSMV CHEM ANLYZR: CPT

## 2021-02-10 PROCEDURE — 84484 ASSAY OF TROPONIN QUANT: CPT

## 2021-02-10 PROCEDURE — G0378 HOSPITAL OBSERVATION PER HR: HCPCS

## 2021-02-10 PROCEDURE — 99205 PR OFFICE/OUTPT VISIT, NEW, LEVL V, 60-74 MIN: ICD-10-PCS | Mod: AF,HB,, | Performed by: PSYCHIATRY & NEUROLOGY

## 2021-02-10 PROCEDURE — U0002 COVID-19 LAB TEST NON-CDC: HCPCS | Performed by: PHYSICIAN ASSISTANT

## 2021-02-10 PROCEDURE — 25000003 PHARM REV CODE 250: Performed by: PSYCHIATRY & NEUROLOGY

## 2021-02-10 PROCEDURE — 99285 EMERGENCY DEPT VISIT HI MDM: CPT | Mod: ,,, | Performed by: PHYSICIAN ASSISTANT

## 2021-02-10 PROCEDURE — 84484 ASSAY OF TROPONIN QUANT: CPT | Mod: 91

## 2021-02-10 PROCEDURE — 25000003 PHARM REV CODE 250: Performed by: PHYSICIAN ASSISTANT

## 2021-02-10 PROCEDURE — 84300 ASSAY OF URINE SODIUM: CPT

## 2021-02-10 PROCEDURE — 87040 BLOOD CULTURE FOR BACTERIA: CPT | Mod: 59

## 2021-02-10 PROCEDURE — 83605 ASSAY OF LACTIC ACID: CPT

## 2021-02-10 PROCEDURE — 96365 THER/PROPH/DIAG IV INF INIT: CPT | Mod: 59

## 2021-02-10 PROCEDURE — 82077 ASSAY SPEC XCP UR&BREATH IA: CPT

## 2021-02-10 PROCEDURE — 25000003 PHARM REV CODE 250: Performed by: EMERGENCY MEDICINE

## 2021-02-10 PROCEDURE — 99285 EMERGENCY DEPT VISIT HI MDM: CPT | Mod: 25

## 2021-02-10 PROCEDURE — 83036 HEMOGLOBIN GLYCOSYLATED A1C: CPT

## 2021-02-10 PROCEDURE — 99220 PR INITIAL OBSERVATION CARE,LEVL III: CPT | Mod: ,,, | Performed by: HOSPITALIST

## 2021-02-10 PROCEDURE — 96376 TX/PRO/DX INJ SAME DRUG ADON: CPT

## 2021-02-10 PROCEDURE — 93005 ELECTROCARDIOGRAM TRACING: CPT

## 2021-02-10 PROCEDURE — 85025 COMPLETE CBC W/AUTO DIFF WBC: CPT

## 2021-02-10 PROCEDURE — 80143 DRUG ASSAY ACETAMINOPHEN: CPT

## 2021-02-10 PROCEDURE — 96372 THER/PROPH/DIAG INJ SC/IM: CPT | Mod: 59

## 2021-02-10 PROCEDURE — 80164 ASSAY DIPROPYLACETIC ACD TOT: CPT

## 2021-02-10 PROCEDURE — 99285 PR EMERGENCY DEPT VISIT,LEVEL V: ICD-10-PCS | Mod: ,,, | Performed by: PHYSICIAN ASSISTANT

## 2021-02-10 PROCEDURE — 96366 THER/PROPH/DIAG IV INF ADDON: CPT

## 2021-02-10 PROCEDURE — 93010 ELECTROCARDIOGRAM REPORT: CPT | Mod: ,,, | Performed by: INTERNAL MEDICINE

## 2021-02-10 PROCEDURE — 99220 PR INITIAL OBSERVATION CARE,LEVL III: ICD-10-PCS | Mod: ,,, | Performed by: HOSPITALIST

## 2021-02-10 PROCEDURE — 25500020 PHARM REV CODE 255: Performed by: EMERGENCY MEDICINE

## 2021-02-10 PROCEDURE — 83880 ASSAY OF NATRIURETIC PEPTIDE: CPT

## 2021-02-10 PROCEDURE — 80053 COMPREHEN METABOLIC PANEL: CPT

## 2021-02-10 PROCEDURE — S4991 NICOTINE PATCH NONLEGEND: HCPCS | Performed by: PHYSICIAN ASSISTANT

## 2021-02-10 PROCEDURE — 81003 URINALYSIS AUTO W/O SCOPE: CPT | Mod: 59

## 2021-02-10 PROCEDURE — 93010 EKG 12-LEAD: ICD-10-PCS | Mod: ,,, | Performed by: INTERNAL MEDICINE

## 2021-02-10 RX ORDER — IBUPROFEN 200 MG
1 TABLET ORAL
Status: COMPLETED | OUTPATIENT
Start: 2021-02-10 | End: 2021-02-11

## 2021-02-10 RX ORDER — CLINDAMYCIN PHOSPHATE 600 MG/50ML
600 INJECTION, SOLUTION INTRAVENOUS
Status: DISCONTINUED | OUTPATIENT
Start: 2021-02-10 | End: 2021-02-11

## 2021-02-10 RX ORDER — CYCLOBENZAPRINE HYDROCHLORIDE 7.5 MG/1
7.5 TABLET, FILM COATED ORAL DAILY PRN
Status: DISCONTINUED | OUTPATIENT
Start: 2021-02-10 | End: 2021-02-10

## 2021-02-10 RX ORDER — FUROSEMIDE 20 MG/1
20 TABLET ORAL DAILY
Status: DISCONTINUED | OUTPATIENT
Start: 2021-02-11 | End: 2021-02-11

## 2021-02-10 RX ORDER — FLUTICASONE FUROATE AND VILANTEROL 100; 25 UG/1; UG/1
1 POWDER RESPIRATORY (INHALATION) DAILY
Refills: 2 | Status: DISCONTINUED | OUTPATIENT
Start: 2021-02-11 | End: 2021-02-13 | Stop reason: HOSPADM

## 2021-02-10 RX ORDER — CYANOCOBALAMIN (VITAMIN B-12) 250 MCG
250 TABLET ORAL DAILY
Status: DISCONTINUED | OUTPATIENT
Start: 2021-02-11 | End: 2021-02-13 | Stop reason: HOSPADM

## 2021-02-10 RX ORDER — ACETAMINOPHEN 325 MG/1
650 TABLET ORAL EVERY 6 HOURS PRN
Status: DISCONTINUED | OUTPATIENT
Start: 2021-02-10 | End: 2021-02-13 | Stop reason: HOSPADM

## 2021-02-10 RX ORDER — CYCLOBENZAPRINE HCL 5 MG
5 TABLET ORAL DAILY PRN
Status: DISCONTINUED | OUTPATIENT
Start: 2021-02-10 | End: 2021-02-13 | Stop reason: HOSPADM

## 2021-02-10 RX ORDER — SODIUM CHLORIDE 0.9 % (FLUSH) 0.9 %
5 SYRINGE (ML) INJECTION
Status: DISCONTINUED | OUTPATIENT
Start: 2021-02-10 | End: 2021-02-13 | Stop reason: HOSPADM

## 2021-02-10 RX ORDER — BISACODYL 10 MG
10 SUPPOSITORY, RECTAL RECTAL DAILY PRN
Status: DISCONTINUED | OUTPATIENT
Start: 2021-02-10 | End: 2021-02-13 | Stop reason: HOSPADM

## 2021-02-10 RX ORDER — CLINDAMYCIN PHOSPHATE 900 MG/50ML
900 INJECTION, SOLUTION INTRAVENOUS
Status: COMPLETED | OUTPATIENT
Start: 2021-02-10 | End: 2021-02-10

## 2021-02-10 RX ORDER — SODIUM CHLORIDE 0.9 % (FLUSH) 0.9 %
10 SYRINGE (ML) INJECTION
Status: DISCONTINUED | OUTPATIENT
Start: 2021-02-10 | End: 2021-02-13 | Stop reason: HOSPADM

## 2021-02-10 RX ORDER — CARBAMAZEPINE 200 MG/1
200 TABLET ORAL 2 TIMES DAILY
Status: DISCONTINUED | OUTPATIENT
Start: 2021-02-10 | End: 2021-02-11

## 2021-02-10 RX ORDER — IBUPROFEN 200 MG
16 TABLET ORAL
Status: DISCONTINUED | OUTPATIENT
Start: 2021-02-10 | End: 2021-02-10

## 2021-02-10 RX ORDER — DIVALPROEX SODIUM 250 MG/1
500 TABLET, DELAYED RELEASE ORAL 2 TIMES DAILY
Status: DISCONTINUED | OUTPATIENT
Start: 2021-02-10 | End: 2021-02-10

## 2021-02-10 RX ORDER — LISINOPRIL 2.5 MG/1
5 TABLET ORAL DAILY
Status: DISCONTINUED | OUTPATIENT
Start: 2021-02-11 | End: 2021-02-13 | Stop reason: HOSPADM

## 2021-02-10 RX ORDER — RISPERIDONE 1 MG/1
2 TABLET ORAL 2 TIMES DAILY
Status: DISCONTINUED | OUTPATIENT
Start: 2021-02-10 | End: 2021-02-11

## 2021-02-10 RX ORDER — HYDROXYZINE PAMOATE 25 MG/1
25 CAPSULE ORAL
Status: COMPLETED | OUTPATIENT
Start: 2021-02-10 | End: 2021-02-10

## 2021-02-10 RX ORDER — BENZONATATE 100 MG/1
100 CAPSULE ORAL 3 TIMES DAILY PRN
Status: DISCONTINUED | OUTPATIENT
Start: 2021-02-10 | End: 2021-02-13 | Stop reason: HOSPADM

## 2021-02-10 RX ORDER — DOXYLAMINE SUCCINATE 25 MG
TABLET ORAL 2 TIMES DAILY
Status: DISCONTINUED | OUTPATIENT
Start: 2021-02-10 | End: 2021-02-13 | Stop reason: HOSPADM

## 2021-02-10 RX ORDER — AMOXICILLIN 250 MG
1 CAPSULE ORAL 2 TIMES DAILY
Status: DISCONTINUED | OUTPATIENT
Start: 2021-02-10 | End: 2021-02-13 | Stop reason: HOSPADM

## 2021-02-10 RX ORDER — CICLOPIROX OLAMINE 7.7 MG/G
CREAM TOPICAL 2 TIMES DAILY
Status: DISCONTINUED | OUTPATIENT
Start: 2021-02-10 | End: 2021-02-13 | Stop reason: HOSPADM

## 2021-02-10 RX ORDER — IPRATROPIUM BROMIDE AND ALBUTEROL SULFATE 2.5; .5 MG/3ML; MG/3ML
3 SOLUTION RESPIRATORY (INHALATION) EVERY 4 HOURS PRN
Status: DISCONTINUED | OUTPATIENT
Start: 2021-02-10 | End: 2021-02-10

## 2021-02-10 RX ORDER — DOXYCYCLINE HYCLATE 100 MG
100 TABLET ORAL 2 TIMES DAILY
Status: DISCONTINUED | OUTPATIENT
Start: 2021-02-10 | End: 2021-02-13 | Stop reason: HOSPADM

## 2021-02-10 RX ORDER — IBUPROFEN 200 MG
24 TABLET ORAL
Status: DISCONTINUED | OUTPATIENT
Start: 2021-02-10 | End: 2021-02-13 | Stop reason: HOSPADM

## 2021-02-10 RX ORDER — ACETAMINOPHEN 325 MG/1
650 TABLET ORAL EVERY 4 HOURS PRN
Status: DISCONTINUED | OUTPATIENT
Start: 2021-02-10 | End: 2021-02-10

## 2021-02-10 RX ORDER — OLANZAPINE 10 MG/2ML
10 INJECTION, POWDER, FOR SOLUTION INTRAMUSCULAR ONCE AS NEEDED
Status: COMPLETED | OUTPATIENT
Start: 2021-02-10 | End: 2021-02-10

## 2021-02-10 RX ORDER — ESCITALOPRAM OXALATE 20 MG/1
20 TABLET ORAL NIGHTLY
Status: DISCONTINUED | OUTPATIENT
Start: 2021-02-10 | End: 2021-02-10

## 2021-02-10 RX ORDER — POLYETHYLENE GLYCOL 3350 17 G/17G
17 POWDER, FOR SOLUTION ORAL DAILY
Status: DISCONTINUED | OUTPATIENT
Start: 2021-02-10 | End: 2021-02-10

## 2021-02-10 RX ORDER — OLANZAPINE 10 MG/1
10 TABLET ORAL EVERY 8 HOURS PRN
Status: DISCONTINUED | OUTPATIENT
Start: 2021-02-10 | End: 2021-02-11

## 2021-02-10 RX ORDER — TALC
6 POWDER (GRAM) TOPICAL NIGHTLY PRN
Status: DISCONTINUED | OUTPATIENT
Start: 2021-02-10 | End: 2021-02-13 | Stop reason: HOSPADM

## 2021-02-10 RX ORDER — SIMETHICONE 80 MG
2 TABLET,CHEWABLE ORAL 3 TIMES DAILY PRN
Status: DISCONTINUED | OUTPATIENT
Start: 2021-02-10 | End: 2021-02-13 | Stop reason: HOSPADM

## 2021-02-10 RX ORDER — GLUCAGON 1 MG
1 KIT INJECTION
Status: DISCONTINUED | OUTPATIENT
Start: 2021-02-10 | End: 2021-02-13 | Stop reason: HOSPADM

## 2021-02-10 RX ORDER — PRAVASTATIN SODIUM 40 MG/1
40 TABLET ORAL DAILY
Status: DISCONTINUED | OUTPATIENT
Start: 2021-02-11 | End: 2021-02-13 | Stop reason: HOSPADM

## 2021-02-10 RX ORDER — DIVALPROEX SODIUM 250 MG/1
500 TABLET, DELAYED RELEASE ORAL 2 TIMES DAILY
Status: DISCONTINUED | OUTPATIENT
Start: 2021-02-10 | End: 2021-02-11

## 2021-02-10 RX ORDER — IBUPROFEN 200 MG
24 TABLET ORAL
Status: DISCONTINUED | OUTPATIENT
Start: 2021-02-10 | End: 2021-02-10

## 2021-02-10 RX ORDER — ONDANSETRON 8 MG/1
8 TABLET, ORALLY DISINTEGRATING ORAL EVERY 8 HOURS PRN
Status: DISCONTINUED | OUTPATIENT
Start: 2021-02-10 | End: 2021-02-13 | Stop reason: HOSPADM

## 2021-02-10 RX ORDER — GABAPENTIN 300 MG/1
300 CAPSULE ORAL 3 TIMES DAILY
Status: DISCONTINUED | OUTPATIENT
Start: 2021-02-10 | End: 2021-02-13 | Stop reason: HOSPADM

## 2021-02-10 RX ORDER — METOPROLOL TARTRATE 50 MG/1
50 TABLET ORAL 2 TIMES DAILY
Status: DISCONTINUED | OUTPATIENT
Start: 2021-02-10 | End: 2021-02-13 | Stop reason: HOSPADM

## 2021-02-10 RX ORDER — GLUCAGON 1 MG
1 KIT INJECTION
Status: DISCONTINUED | OUTPATIENT
Start: 2021-02-10 | End: 2021-02-10

## 2021-02-10 RX ORDER — POLYETHYLENE GLYCOL 3350 17 G/17G
17 POWDER, FOR SOLUTION ORAL DAILY
Status: DISCONTINUED | OUTPATIENT
Start: 2021-02-10 | End: 2021-02-13 | Stop reason: HOSPADM

## 2021-02-10 RX ORDER — FLUTICASONE PROPIONATE 50 MCG
1 SPRAY, SUSPENSION (ML) NASAL DAILY
Status: DISCONTINUED | OUTPATIENT
Start: 2021-02-11 | End: 2021-02-13 | Stop reason: HOSPADM

## 2021-02-10 RX ORDER — ENOXAPARIN SODIUM 100 MG/ML
40 INJECTION SUBCUTANEOUS EVERY 24 HOURS
Status: DISCONTINUED | OUTPATIENT
Start: 2021-02-10 | End: 2021-02-10

## 2021-02-10 RX ORDER — IBUPROFEN 200 MG
16 TABLET ORAL
Status: DISCONTINUED | OUTPATIENT
Start: 2021-02-10 | End: 2021-02-13 | Stop reason: HOSPADM

## 2021-02-10 RX ORDER — FAMOTIDINE 20 MG/1
20 TABLET, FILM COATED ORAL 2 TIMES DAILY
Status: DISCONTINUED | OUTPATIENT
Start: 2021-02-10 | End: 2021-02-13 | Stop reason: HOSPADM

## 2021-02-10 RX ORDER — CLONIDINE HYDROCHLORIDE 0.1 MG/1
0.1 TABLET ORAL NIGHTLY
Status: DISCONTINUED | OUTPATIENT
Start: 2021-02-10 | End: 2021-02-13 | Stop reason: HOSPADM

## 2021-02-10 RX ORDER — ALBUTEROL SULFATE 90 UG/1
1 AEROSOL, METERED RESPIRATORY (INHALATION) EVERY 6 HOURS PRN
Status: DISCONTINUED | OUTPATIENT
Start: 2021-02-10 | End: 2021-02-13 | Stop reason: HOSPADM

## 2021-02-10 RX ORDER — ASPIRIN 81 MG/1
81 TABLET ORAL DAILY
Status: DISCONTINUED | OUTPATIENT
Start: 2021-02-11 | End: 2021-02-13 | Stop reason: HOSPADM

## 2021-02-10 RX ADMIN — RISPERIDONE 2 MG: 1 TABLET ORAL at 09:02

## 2021-02-10 RX ADMIN — METOPROLOL TARTRATE 50 MG: 50 TABLET, FILM COATED ORAL at 09:02

## 2021-02-10 RX ADMIN — ACETAMINOPHEN 650 MG: 325 TABLET ORAL at 02:02

## 2021-02-10 RX ADMIN — IOHEXOL 100 ML: 350 INJECTION, SOLUTION INTRAVENOUS at 06:02

## 2021-02-10 RX ADMIN — GABAPENTIN 300 MG: 300 CAPSULE ORAL at 09:02

## 2021-02-10 RX ADMIN — OLANZAPINE 10 MG: 10 INJECTION, POWDER, LYOPHILIZED, FOR SOLUTION INTRAMUSCULAR at 09:02

## 2021-02-10 RX ADMIN — DIVALPROEX SODIUM 500 MG: 250 TABLET, DELAYED RELEASE ORAL at 02:02

## 2021-02-10 RX ADMIN — OLANZAPINE 10 MG: 10 TABLET, FILM COATED ORAL at 09:02

## 2021-02-10 RX ADMIN — HYDROXYZINE PAMOATE 25 MG: 25 CAPSULE ORAL at 05:02

## 2021-02-10 RX ADMIN — FAMOTIDINE 20 MG: 20 TABLET, FILM COATED ORAL at 09:02

## 2021-02-10 RX ADMIN — CLINDAMYCIN IN 5 PERCENT DEXTROSE 600 MG: 12 INJECTION, SOLUTION INTRAVENOUS at 04:02

## 2021-02-10 RX ADMIN — CLINDAMYCIN IN 5 PERCENT DEXTROSE 900 MG: 18 INJECTION, SOLUTION INTRAVENOUS at 05:02

## 2021-02-10 RX ADMIN — SODIUM CHLORIDE 1000 ML: 0.9 INJECTION, SOLUTION INTRAVENOUS at 06:02

## 2021-02-10 RX ADMIN — MIRTAZAPINE 45 MG: 30 TABLET, FILM COATED ORAL at 09:02

## 2021-02-10 RX ADMIN — CARBAMAZEPINE 200 MG: 200 TABLET ORAL at 09:02

## 2021-02-10 RX ADMIN — CLINDAMYCIN IN 5 PERCENT DEXTROSE 600 MG: 12 INJECTION, SOLUTION INTRAVENOUS at 11:02

## 2021-02-10 RX ADMIN — DOXYCYCLINE HYCLATE 100 MG: 100 TABLET, COATED ORAL at 09:02

## 2021-02-10 RX ADMIN — CLONIDINE HYDROCHLORIDE 0.1 MG: 0.1 TABLET ORAL at 09:02

## 2021-02-10 RX ADMIN — DOCUSATE SODIUM 50MG AND SENNOSIDES 8.6MG 1 TABLET: 8.6; 5 TABLET, FILM COATED ORAL at 09:02

## 2021-02-10 RX ADMIN — APIXABAN 5 MG: 5 TABLET, FILM COATED ORAL at 09:02

## 2021-02-10 RX ADMIN — DIVALPROEX SODIUM 500 MG: 250 TABLET, DELAYED RELEASE ORAL at 09:02

## 2021-02-10 RX ADMIN — NICOTINE 1 PATCH: 21 PATCH, EXTENDED RELEASE TRANSDERMAL at 04:02

## 2021-02-10 RX ADMIN — RISPERIDONE 2 MG: 1 TABLET ORAL at 02:02

## 2021-02-11 PROBLEM — E11.621 DIABETIC ULCER OF LEFT MIDFOOT ASSOCIATED WITH TYPE 2 DIABETES MELLITUS, LIMITED TO BREAKDOWN OF SKIN: Status: ACTIVE | Noted: 2021-02-11

## 2021-02-11 PROBLEM — L97.421 DIABETIC ULCER OF LEFT MIDFOOT ASSOCIATED WITH TYPE 2 DIABETES MELLITUS, LIMITED TO BREAKDOWN OF SKIN: Status: ACTIVE | Noted: 2021-02-11

## 2021-02-11 LAB
ALBUMIN SERPL BCP-MCNC: 3.4 G/DL (ref 3.5–5.2)
ALP SERPL-CCNC: 82 U/L (ref 55–135)
ALT SERPL W/O P-5'-P-CCNC: 20 U/L (ref 10–44)
ANION GAP SERPL CALC-SCNC: 13 MMOL/L (ref 8–16)
ANION GAP SERPL CALC-SCNC: 13 MMOL/L (ref 8–16)
AST SERPL-CCNC: 17 U/L (ref 10–40)
BASOPHILS # BLD AUTO: 0.08 K/UL (ref 0–0.2)
BASOPHILS # BLD AUTO: 0.08 K/UL (ref 0–0.2)
BASOPHILS NFR BLD: 0.8 % (ref 0–1.9)
BASOPHILS NFR BLD: 0.8 % (ref 0–1.9)
BILIRUB SERPL-MCNC: 0.2 MG/DL (ref 0.1–1)
BUN SERPL-MCNC: 5 MG/DL (ref 6–20)
BUN SERPL-MCNC: 5 MG/DL (ref 6–20)
CALCIUM SERPL-MCNC: 8.8 MG/DL (ref 8.7–10.5)
CALCIUM SERPL-MCNC: 8.8 MG/DL (ref 8.7–10.5)
CHLORIDE SERPL-SCNC: 95 MMOL/L (ref 95–110)
CHLORIDE SERPL-SCNC: 95 MMOL/L (ref 95–110)
CO2 SERPL-SCNC: 25 MMOL/L (ref 23–29)
CO2 SERPL-SCNC: 25 MMOL/L (ref 23–29)
CREAT SERPL-MCNC: 0.6 MG/DL (ref 0.5–1.4)
CREAT SERPL-MCNC: 0.6 MG/DL (ref 0.5–1.4)
DIFFERENTIAL METHOD: ABNORMAL
DIFFERENTIAL METHOD: ABNORMAL
EOSINOPHIL # BLD AUTO: 0.3 K/UL (ref 0–0.5)
EOSINOPHIL # BLD AUTO: 0.3 K/UL (ref 0–0.5)
EOSINOPHIL NFR BLD: 2.6 % (ref 0–8)
EOSINOPHIL NFR BLD: 2.6 % (ref 0–8)
ERYTHROCYTE [DISTWIDTH] IN BLOOD BY AUTOMATED COUNT: 15.9 % (ref 11.5–14.5)
ERYTHROCYTE [DISTWIDTH] IN BLOOD BY AUTOMATED COUNT: 15.9 % (ref 11.5–14.5)
EST. GFR  (AFRICAN AMERICAN): >60 ML/MIN/1.73 M^2
EST. GFR  (AFRICAN AMERICAN): >60 ML/MIN/1.73 M^2
EST. GFR  (NON AFRICAN AMERICAN): >60 ML/MIN/1.73 M^2
EST. GFR  (NON AFRICAN AMERICAN): >60 ML/MIN/1.73 M^2
ESTIMATED AVG GLUCOSE: 146 MG/DL (ref 68–131)
GLUCOSE SERPL-MCNC: 144 MG/DL (ref 70–110)
GLUCOSE SERPL-MCNC: 144 MG/DL (ref 70–110)
HBA1C MFR BLD: 6.7 % (ref 4–5.6)
HCT VFR BLD AUTO: 33.7 % (ref 37–48.5)
HCT VFR BLD AUTO: 33.7 % (ref 37–48.5)
HGB BLD-MCNC: 10.7 G/DL (ref 12–16)
HGB BLD-MCNC: 10.7 G/DL (ref 12–16)
IMM GRANULOCYTES # BLD AUTO: 0.03 K/UL (ref 0–0.04)
IMM GRANULOCYTES # BLD AUTO: 0.03 K/UL (ref 0–0.04)
IMM GRANULOCYTES NFR BLD AUTO: 0.3 % (ref 0–0.5)
IMM GRANULOCYTES NFR BLD AUTO: 0.3 % (ref 0–0.5)
LYMPHOCYTES # BLD AUTO: 4.4 K/UL (ref 1–4.8)
LYMPHOCYTES # BLD AUTO: 4.4 K/UL (ref 1–4.8)
LYMPHOCYTES NFR BLD: 43.3 % (ref 18–48)
LYMPHOCYTES NFR BLD: 43.3 % (ref 18–48)
MAGNESIUM SERPL-MCNC: 1.3 MG/DL (ref 1.6–2.6)
MAGNESIUM SERPL-MCNC: 1.3 MG/DL (ref 1.6–2.6)
MCH RBC QN AUTO: 26.9 PG (ref 27–31)
MCH RBC QN AUTO: 26.9 PG (ref 27–31)
MCHC RBC AUTO-ENTMCNC: 31.8 G/DL (ref 32–36)
MCHC RBC AUTO-ENTMCNC: 31.8 G/DL (ref 32–36)
MCV RBC AUTO: 85 FL (ref 82–98)
MCV RBC AUTO: 85 FL (ref 82–98)
MONOCYTES # BLD AUTO: 1.3 K/UL (ref 0.3–1)
MONOCYTES # BLD AUTO: 1.3 K/UL (ref 0.3–1)
MONOCYTES NFR BLD: 12.4 % (ref 4–15)
MONOCYTES NFR BLD: 12.4 % (ref 4–15)
NEUTROPHILS # BLD AUTO: 4.1 K/UL (ref 1.8–7.7)
NEUTROPHILS # BLD AUTO: 4.1 K/UL (ref 1.8–7.7)
NEUTROPHILS NFR BLD: 40.6 % (ref 38–73)
NEUTROPHILS NFR BLD: 40.6 % (ref 38–73)
NRBC BLD-RTO: 0 /100 WBC
NRBC BLD-RTO: 0 /100 WBC
PHOSPHATE SERPL-MCNC: 3.5 MG/DL (ref 2.7–4.5)
PLATELET # BLD AUTO: 556 K/UL (ref 150–350)
PLATELET # BLD AUTO: 556 K/UL (ref 150–350)
PMV BLD AUTO: 9.8 FL (ref 9.2–12.9)
PMV BLD AUTO: 9.8 FL (ref 9.2–12.9)
POCT GLUCOSE: 134 MG/DL (ref 70–110)
POCT GLUCOSE: 141 MG/DL (ref 70–110)
POCT GLUCOSE: 190 MG/DL (ref 70–110)
POCT GLUCOSE: 99 MG/DL (ref 70–110)
POTASSIUM SERPL-SCNC: 4.4 MMOL/L (ref 3.5–5.1)
POTASSIUM SERPL-SCNC: 4.4 MMOL/L (ref 3.5–5.1)
PROT SERPL-MCNC: 6.4 G/DL (ref 6–8.4)
RBC # BLD AUTO: 3.98 M/UL (ref 4–5.4)
RBC # BLD AUTO: 3.98 M/UL (ref 4–5.4)
SODIUM SERPL-SCNC: 133 MMOL/L (ref 136–145)
SODIUM SERPL-SCNC: 133 MMOL/L (ref 136–145)
WBC # BLD AUTO: 10.05 K/UL (ref 3.9–12.7)
WBC # BLD AUTO: 10.05 K/UL (ref 3.9–12.7)

## 2021-02-11 PROCEDURE — 96375 TX/PRO/DX INJ NEW DRUG ADDON: CPT

## 2021-02-11 PROCEDURE — 85025 COMPLETE CBC W/AUTO DIFF WBC: CPT

## 2021-02-11 PROCEDURE — 84100 ASSAY OF PHOSPHORUS: CPT

## 2021-02-11 PROCEDURE — 94640 AIRWAY INHALATION TREATMENT: CPT

## 2021-02-11 PROCEDURE — 25000003 PHARM REV CODE 250: Performed by: HOSPITALIST

## 2021-02-11 PROCEDURE — 63600175 PHARM REV CODE 636 W HCPCS: Performed by: HOSPITALIST

## 2021-02-11 PROCEDURE — 99225 PR SUBSEQUENT OBSERVATION CARE,LEVEL II: CPT | Mod: ,,, | Performed by: HOSPITALIST

## 2021-02-11 PROCEDURE — 99024 POSTOP FOLLOW-UP VISIT: CPT | Mod: ,,, | Performed by: PODIATRIST

## 2021-02-11 PROCEDURE — 25000003 PHARM REV CODE 250: Performed by: PSYCHIATRY & NEUROLOGY

## 2021-02-11 PROCEDURE — 83036 HEMOGLOBIN GLYCOSYLATED A1C: CPT

## 2021-02-11 PROCEDURE — 94761 N-INVAS EAR/PLS OXIMETRY MLT: CPT

## 2021-02-11 PROCEDURE — 36415 COLL VENOUS BLD VENIPUNCTURE: CPT

## 2021-02-11 PROCEDURE — 25000003 PHARM REV CODE 250: Performed by: PHYSICIAN ASSISTANT

## 2021-02-11 PROCEDURE — 99213 PR OFFICE/OUTPT VISIT, EST, LEVL III, 20-29 MIN: ICD-10-PCS | Mod: AF,HB,, | Performed by: PSYCHIATRY & NEUROLOGY

## 2021-02-11 PROCEDURE — 25000242 PHARM REV CODE 250 ALT 637 W/ HCPCS: Performed by: HOSPITALIST

## 2021-02-11 PROCEDURE — 96372 THER/PROPH/DIAG INJ SC/IM: CPT

## 2021-02-11 PROCEDURE — 96376 TX/PRO/DX INJ SAME DRUG ADON: CPT

## 2021-02-11 PROCEDURE — 80053 COMPREHEN METABOLIC PANEL: CPT

## 2021-02-11 PROCEDURE — S0166 INJ OLANZAPINE 2.5MG: HCPCS | Performed by: PHYSICIAN ASSISTANT

## 2021-02-11 PROCEDURE — 99024 PR POST-OP FOLLOW-UP VISIT: ICD-10-PCS | Mod: ,,, | Performed by: PODIATRIST

## 2021-02-11 PROCEDURE — G0378 HOSPITAL OBSERVATION PER HR: HCPCS

## 2021-02-11 PROCEDURE — 99225 PR SUBSEQUENT OBSERVATION CARE,LEVEL II: ICD-10-PCS | Mod: ,,, | Performed by: HOSPITALIST

## 2021-02-11 PROCEDURE — 83735 ASSAY OF MAGNESIUM: CPT

## 2021-02-11 PROCEDURE — 99213 OFFICE O/P EST LOW 20 MIN: CPT | Mod: AF,HB,, | Performed by: PSYCHIATRY & NEUROLOGY

## 2021-02-11 RX ORDER — HALOPERIDOL 5 MG/1
5 TABLET ORAL EVERY 8 HOURS PRN
Status: DISCONTINUED | OUTPATIENT
Start: 2021-02-11 | End: 2021-02-13 | Stop reason: HOSPADM

## 2021-02-11 RX ORDER — MAGNESIUM SULFATE HEPTAHYDRATE 40 MG/ML
2 INJECTION, SOLUTION INTRAVENOUS ONCE
Status: COMPLETED | OUTPATIENT
Start: 2021-02-11 | End: 2021-02-11

## 2021-02-11 RX ORDER — INSULIN ASPART 100 [IU]/ML
0-5 INJECTION, SOLUTION INTRAVENOUS; SUBCUTANEOUS
Status: DISCONTINUED | OUTPATIENT
Start: 2021-02-11 | End: 2021-02-13 | Stop reason: HOSPADM

## 2021-02-11 RX ORDER — HALOPERIDOL 5 MG/ML
5 INJECTION INTRAMUSCULAR EVERY 6 HOURS PRN
Status: DISCONTINUED | OUTPATIENT
Start: 2021-02-11 | End: 2021-02-13 | Stop reason: HOSPADM

## 2021-02-11 RX ORDER — HALOPERIDOL 5 MG/ML
2 INJECTION INTRAMUSCULAR ONCE
Status: COMPLETED | OUTPATIENT
Start: 2021-02-11 | End: 2021-02-11

## 2021-02-11 RX ORDER — OLANZAPINE 10 MG/2ML
5 INJECTION, POWDER, FOR SOLUTION INTRAMUSCULAR ONCE
Status: COMPLETED | OUTPATIENT
Start: 2021-02-11 | End: 2021-02-11

## 2021-02-11 RX ORDER — DIPHENHYDRAMINE HYDROCHLORIDE 50 MG/ML
50 INJECTION INTRAMUSCULAR; INTRAVENOUS EVERY 6 HOURS PRN
Status: DISCONTINUED | OUTPATIENT
Start: 2021-02-11 | End: 2021-02-13 | Stop reason: HOSPADM

## 2021-02-11 RX ORDER — LORAZEPAM 2 MG/ML
2 INJECTION INTRAMUSCULAR EVERY 6 HOURS PRN
Status: DISCONTINUED | OUTPATIENT
Start: 2021-02-11 | End: 2021-02-13 | Stop reason: HOSPADM

## 2021-02-11 RX ORDER — HALOPERIDOL 1 MG/1
2 TABLET ORAL 2 TIMES DAILY
Status: DISCONTINUED | OUTPATIENT
Start: 2021-02-11 | End: 2021-02-13 | Stop reason: HOSPADM

## 2021-02-11 RX ORDER — DIVALPROEX SODIUM 250 MG/1
500 TABLET, DELAYED RELEASE ORAL ONCE
Status: COMPLETED | OUTPATIENT
Start: 2021-02-11 | End: 2021-02-11

## 2021-02-11 RX ORDER — DIVALPROEX SODIUM 250 MG/1
1000 TABLET, DELAYED RELEASE ORAL 2 TIMES DAILY
Status: DISCONTINUED | OUTPATIENT
Start: 2021-02-11 | End: 2021-02-13 | Stop reason: HOSPADM

## 2021-02-11 RX ORDER — CLONAZEPAM 0.5 MG/1
1 TABLET ORAL 2 TIMES DAILY
Status: DISCONTINUED | OUTPATIENT
Start: 2021-02-11 | End: 2021-02-13 | Stop reason: HOSPADM

## 2021-02-11 RX ADMIN — ACETAMINOPHEN 650 MG: 325 TABLET ORAL at 08:02

## 2021-02-11 RX ADMIN — PRAVASTATIN SODIUM 40 MG: 40 TABLET ORAL at 08:02

## 2021-02-11 RX ADMIN — ALBUTEROL SULFATE 1 PUFF: 108 INHALANT RESPIRATORY (INHALATION) at 10:02

## 2021-02-11 RX ADMIN — FUROSEMIDE 20 MG: 20 TABLET ORAL at 08:02

## 2021-02-11 RX ADMIN — DOXYCYCLINE HYCLATE 100 MG: 100 TABLET, COATED ORAL at 08:02

## 2021-02-11 RX ADMIN — RISPERIDONE 2 MG: 1 TABLET ORAL at 08:02

## 2021-02-11 RX ADMIN — LISINOPRIL 5 MG: 2.5 TABLET ORAL at 08:02

## 2021-02-11 RX ADMIN — OLANZAPINE 5 MG: 10 INJECTION, POWDER, FOR SOLUTION INTRAMUSCULAR at 04:02

## 2021-02-11 RX ADMIN — CLONAZEPAM 1 MG: 0.5 TABLET ORAL at 08:02

## 2021-02-11 RX ADMIN — CLINDAMYCIN IN 5 PERCENT DEXTROSE 600 MG: 12 INJECTION, SOLUTION INTRAVENOUS at 08:02

## 2021-02-11 RX ADMIN — FAMOTIDINE 20 MG: 20 TABLET, FILM COATED ORAL at 08:02

## 2021-02-11 RX ADMIN — APIXABAN 5 MG: 5 TABLET, FILM COATED ORAL at 08:02

## 2021-02-11 RX ADMIN — CYANOCOBALAMIN TAB 250 MCG 250 MCG: 250 TAB at 08:02

## 2021-02-11 RX ADMIN — METOPROLOL TARTRATE 50 MG: 50 TABLET, FILM COATED ORAL at 08:02

## 2021-02-11 RX ADMIN — GABAPENTIN 300 MG: 300 CAPSULE ORAL at 04:02

## 2021-02-11 RX ADMIN — DOCUSATE SODIUM 50MG AND SENNOSIDES 8.6MG 1 TABLET: 8.6; 5 TABLET, FILM COATED ORAL at 08:02

## 2021-02-11 RX ADMIN — DIVALPROEX SODIUM 500 MG: 250 TABLET, DELAYED RELEASE ORAL at 08:02

## 2021-02-11 RX ADMIN — MICONAZOLE NITRATE: 20 CREAM TOPICAL at 12:02

## 2021-02-11 RX ADMIN — GABAPENTIN 300 MG: 300 CAPSULE ORAL at 08:02

## 2021-02-11 RX ADMIN — CLONAZEPAM 1 MG: 0.5 TABLET ORAL at 11:02

## 2021-02-11 RX ADMIN — DIVALPROEX SODIUM 500 MG: 250 TABLET, DELAYED RELEASE ORAL at 12:02

## 2021-02-11 RX ADMIN — CARBAMAZEPINE 200 MG: 200 TABLET ORAL at 08:02

## 2021-02-11 RX ADMIN — HALOPERIDOL 2 MG: 1 TABLET ORAL at 11:02

## 2021-02-11 RX ADMIN — ACETAMINOPHEN 650 MG: 325 TABLET ORAL at 03:02

## 2021-02-11 RX ADMIN — THERA TABS 1 TABLET: TAB at 08:02

## 2021-02-11 RX ADMIN — ASPIRIN 81 MG: 81 TABLET, COATED ORAL at 08:02

## 2021-02-11 RX ADMIN — HALOPERIDOL 2 MG: 1 TABLET ORAL at 08:02

## 2021-02-11 RX ADMIN — DIVALPROEX SODIUM 1000 MG: 250 TABLET, DELAYED RELEASE ORAL at 08:02

## 2021-02-11 RX ADMIN — FLUTICASONE FUROATE AND VILANTEROL TRIFENATATE 1 PUFF: 100; 25 POWDER RESPIRATORY (INHALATION) at 08:02

## 2021-02-11 RX ADMIN — MAGNESIUM SULFATE 2 G: 2 INJECTION INTRAVENOUS at 10:02

## 2021-02-11 RX ADMIN — HALOPERIDOL LACTATE 2 MG: 5 INJECTION, SOLUTION INTRAMUSCULAR at 08:02

## 2021-02-12 LAB
ALBUMIN SERPL BCP-MCNC: 3.3 G/DL (ref 3.5–5.2)
ALP SERPL-CCNC: 85 U/L (ref 55–135)
ALT SERPL W/O P-5'-P-CCNC: 18 U/L (ref 10–44)
ANION GAP SERPL CALC-SCNC: 10 MMOL/L (ref 8–16)
AST SERPL-CCNC: 12 U/L (ref 10–40)
BACTERIA SPEC AEROBE CULT: NORMAL
BASOPHILS # BLD AUTO: 0.14 K/UL (ref 0–0.2)
BASOPHILS NFR BLD: 1.4 % (ref 0–1.9)
BILIRUB SERPL-MCNC: 0.1 MG/DL (ref 0.1–1)
BUN SERPL-MCNC: 10 MG/DL (ref 6–20)
CALCIUM SERPL-MCNC: 8.6 MG/DL (ref 8.7–10.5)
CHLORIDE SERPL-SCNC: 95 MMOL/L (ref 95–110)
CO2 SERPL-SCNC: 26 MMOL/L (ref 23–29)
CREAT SERPL-MCNC: 0.6 MG/DL (ref 0.5–1.4)
DIFFERENTIAL METHOD: ABNORMAL
EOSINOPHIL # BLD AUTO: 0.5 K/UL (ref 0–0.5)
EOSINOPHIL NFR BLD: 4.6 % (ref 0–8)
ERYTHROCYTE [DISTWIDTH] IN BLOOD BY AUTOMATED COUNT: 16.4 % (ref 11.5–14.5)
EST. GFR  (AFRICAN AMERICAN): >60 ML/MIN/1.73 M^2
EST. GFR  (NON AFRICAN AMERICAN): >60 ML/MIN/1.73 M^2
GLUCOSE SERPL-MCNC: 148 MG/DL (ref 70–110)
HCT VFR BLD AUTO: 33.4 % (ref 37–48.5)
HGB BLD-MCNC: 10.8 G/DL (ref 12–16)
IMM GRANULOCYTES # BLD AUTO: 0.06 K/UL (ref 0–0.04)
IMM GRANULOCYTES NFR BLD AUTO: 0.6 % (ref 0–0.5)
LYMPHOCYTES # BLD AUTO: 4.5 K/UL (ref 1–4.8)
LYMPHOCYTES NFR BLD: 45.9 % (ref 18–48)
MAGNESIUM SERPL-MCNC: 1.4 MG/DL (ref 1.6–2.6)
MCH RBC QN AUTO: 27.7 PG (ref 27–31)
MCHC RBC AUTO-ENTMCNC: 32.3 G/DL (ref 32–36)
MCV RBC AUTO: 86 FL (ref 82–98)
MONOCYTES # BLD AUTO: 0.9 K/UL (ref 0.3–1)
MONOCYTES NFR BLD: 9.3 % (ref 4–15)
NEUTROPHILS # BLD AUTO: 3.8 K/UL (ref 1.8–7.7)
NEUTROPHILS NFR BLD: 38.2 % (ref 38–73)
NRBC BLD-RTO: 0 /100 WBC
PHOSPHATE SERPL-MCNC: 3.3 MG/DL (ref 2.7–4.5)
PLATELET # BLD AUTO: 589 K/UL (ref 150–350)
PMV BLD AUTO: 10.2 FL (ref 9.2–12.9)
POCT GLUCOSE: 125 MG/DL (ref 70–110)
POCT GLUCOSE: 140 MG/DL (ref 70–110)
POCT GLUCOSE: 166 MG/DL (ref 70–110)
POCT GLUCOSE: 167 MG/DL (ref 70–110)
POTASSIUM SERPL-SCNC: 4.6 MMOL/L (ref 3.5–5.1)
PROT SERPL-MCNC: 6.5 G/DL (ref 6–8.4)
RBC # BLD AUTO: 3.9 M/UL (ref 4–5.4)
SODIUM SERPL-SCNC: 131 MMOL/L (ref 136–145)
WBC # BLD AUTO: 9.88 K/UL (ref 3.9–12.7)

## 2021-02-12 PROCEDURE — 83735 ASSAY OF MAGNESIUM: CPT

## 2021-02-12 PROCEDURE — 93010 ELECTROCARDIOGRAM REPORT: CPT | Mod: ,,, | Performed by: INTERNAL MEDICINE

## 2021-02-12 PROCEDURE — 85025 COMPLETE CBC W/AUTO DIFF WBC: CPT

## 2021-02-12 PROCEDURE — 25000003 PHARM REV CODE 250: Performed by: HOSPITALIST

## 2021-02-12 PROCEDURE — 25000003 PHARM REV CODE 250: Performed by: PHYSICIAN ASSISTANT

## 2021-02-12 PROCEDURE — 36415 COLL VENOUS BLD VENIPUNCTURE: CPT

## 2021-02-12 PROCEDURE — G0378 HOSPITAL OBSERVATION PER HR: HCPCS

## 2021-02-12 PROCEDURE — 94760 N-INVAS EAR/PLS OXIMETRY 1: CPT

## 2021-02-12 PROCEDURE — S4991 NICOTINE PATCH NONLEGEND: HCPCS | Performed by: HOSPITALIST

## 2021-02-12 PROCEDURE — 99900035 HC TECH TIME PER 15 MIN (STAT)

## 2021-02-12 PROCEDURE — 99225 PR SUBSEQUENT OBSERVATION CARE,LEVEL II: CPT | Mod: ,,, | Performed by: HOSPITALIST

## 2021-02-12 PROCEDURE — 84100 ASSAY OF PHOSPHORUS: CPT

## 2021-02-12 PROCEDURE — 99212 OFFICE O/P EST SF 10 MIN: CPT | Mod: AF,HB,, | Performed by: PSYCHIATRY & NEUROLOGY

## 2021-02-12 PROCEDURE — 93010 EKG 12-LEAD: ICD-10-PCS | Mod: ,,, | Performed by: INTERNAL MEDICINE

## 2021-02-12 PROCEDURE — 63600175 PHARM REV CODE 636 W HCPCS: Performed by: HOSPITALIST

## 2021-02-12 PROCEDURE — 80053 COMPREHEN METABOLIC PANEL: CPT

## 2021-02-12 PROCEDURE — 96376 TX/PRO/DX INJ SAME DRUG ADON: CPT

## 2021-02-12 PROCEDURE — 99212 PR OFFICE/OUTPT VISIT, EST, LEVL II, 10-19 MIN: ICD-10-PCS | Mod: AF,HB,, | Performed by: PSYCHIATRY & NEUROLOGY

## 2021-02-12 PROCEDURE — 93005 ELECTROCARDIOGRAM TRACING: CPT

## 2021-02-12 PROCEDURE — 99225 PR SUBSEQUENT OBSERVATION CARE,LEVEL II: ICD-10-PCS | Mod: ,,, | Performed by: HOSPITALIST

## 2021-02-12 PROCEDURE — 94640 AIRWAY INHALATION TREATMENT: CPT

## 2021-02-12 RX ORDER — MAGNESIUM SULFATE HEPTAHYDRATE 40 MG/ML
2 INJECTION, SOLUTION INTRAVENOUS ONCE
Status: COMPLETED | OUTPATIENT
Start: 2021-02-12 | End: 2021-02-12

## 2021-02-12 RX ORDER — DIVALPROEX SODIUM 500 MG/1
1000 TABLET, DELAYED RELEASE ORAL 2 TIMES DAILY
Qty: 120 TABLET | Refills: 11 | OUTPATIENT
Start: 2021-02-12 | End: 2022-02-12

## 2021-02-12 RX ORDER — HALOPERIDOL 5 MG/ML
5 INJECTION INTRAMUSCULAR EVERY 6 HOURS PRN
Start: 2021-02-12 | End: 2022-02-12

## 2021-02-12 RX ORDER — IBUPROFEN 200 MG
1 TABLET ORAL DAILY
Status: DISCONTINUED | OUTPATIENT
Start: 2021-02-12 | End: 2021-02-13 | Stop reason: HOSPADM

## 2021-02-12 RX ORDER — IBUPROFEN 200 MG
1 TABLET ORAL DAILY
Status: DISCONTINUED | OUTPATIENT
Start: 2021-02-13 | End: 2021-02-12

## 2021-02-12 RX ORDER — CLONAZEPAM 1 MG/1
1 TABLET ORAL 2 TIMES DAILY
Qty: 60 TABLET | Refills: 11 | OUTPATIENT
Start: 2021-02-12 | End: 2022-02-12

## 2021-02-12 RX ORDER — DIPHENHYDRAMINE HYDROCHLORIDE 50 MG/ML
50 INJECTION INTRAMUSCULAR; INTRAVENOUS EVERY 6 HOURS PRN
Start: 2021-02-12

## 2021-02-12 RX ORDER — HALOPERIDOL 2 MG/1
2 TABLET ORAL 2 TIMES DAILY
Qty: 60 TABLET | Refills: 11 | OUTPATIENT
Start: 2021-02-12 | End: 2022-02-12

## 2021-02-12 RX ADMIN — CLONIDINE HYDROCHLORIDE 0.1 MG: 0.1 TABLET ORAL at 09:02

## 2021-02-12 RX ADMIN — GABAPENTIN 300 MG: 300 CAPSULE ORAL at 09:02

## 2021-02-12 RX ADMIN — CLONAZEPAM 1 MG: 0.5 TABLET ORAL at 09:02

## 2021-02-12 RX ADMIN — ASPIRIN 81 MG: 81 TABLET, COATED ORAL at 09:02

## 2021-02-12 RX ADMIN — APIXABAN 5 MG: 5 TABLET, FILM COATED ORAL at 09:02

## 2021-02-12 RX ADMIN — MICONAZOLE NITRATE: 20 CREAM TOPICAL at 09:02

## 2021-02-12 RX ADMIN — ALBUTEROL SULFATE 1 PUFF: 108 INHALANT RESPIRATORY (INHALATION) at 09:02

## 2021-02-12 RX ADMIN — ACETAMINOPHEN 650 MG: 325 TABLET ORAL at 03:02

## 2021-02-12 RX ADMIN — DOCUSATE SODIUM 50MG AND SENNOSIDES 8.6MG 1 TABLET: 8.6; 5 TABLET, FILM COATED ORAL at 09:02

## 2021-02-12 RX ADMIN — LISINOPRIL 5 MG: 2.5 TABLET ORAL at 09:02

## 2021-02-12 RX ADMIN — DOXYCYCLINE HYCLATE 100 MG: 100 TABLET, COATED ORAL at 09:02

## 2021-02-12 RX ADMIN — DIVALPROEX SODIUM 1000 MG: 250 TABLET, DELAYED RELEASE ORAL at 09:02

## 2021-02-12 RX ADMIN — METOPROLOL TARTRATE 50 MG: 50 TABLET, FILM COATED ORAL at 09:02

## 2021-02-12 RX ADMIN — FAMOTIDINE 20 MG: 20 TABLET, FILM COATED ORAL at 09:02

## 2021-02-12 RX ADMIN — HALOPERIDOL 2 MG: 1 TABLET ORAL at 09:02

## 2021-02-12 RX ADMIN — CYANOCOBALAMIN TAB 250 MCG 250 MCG: 250 TAB at 09:02

## 2021-02-12 RX ADMIN — NICOTINE 1 PATCH: 14 PATCH TRANSDERMAL at 06:02

## 2021-02-12 RX ADMIN — BENZONATATE 100 MG: 100 CAPSULE ORAL at 03:02

## 2021-02-12 RX ADMIN — PRAVASTATIN SODIUM 40 MG: 40 TABLET ORAL at 09:02

## 2021-02-12 RX ADMIN — THERA TABS 1 TABLET: TAB at 09:02

## 2021-02-12 RX ADMIN — FLUTICASONE FUROATE AND VILANTEROL TRIFENATATE 1 PUFF: 100; 25 POWDER RESPIRATORY (INHALATION) at 09:02

## 2021-02-12 RX ADMIN — POLYETHYLENE GLYCOL 3350 17 G: 17 POWDER, FOR SOLUTION ORAL at 09:02

## 2021-02-12 RX ADMIN — MAGNESIUM SULFATE 2 G: 2 INJECTION INTRAVENOUS at 10:02

## 2021-02-12 RX ADMIN — GABAPENTIN 300 MG: 300 CAPSULE ORAL at 02:02

## 2021-02-13 ENCOUNTER — PATIENT OUTREACH (OUTPATIENT)
Dept: ADMINISTRATIVE | Facility: OTHER | Age: 51
End: 2021-02-13

## 2021-02-13 VITALS
WEIGHT: 216.06 LBS | DIASTOLIC BLOOD PRESSURE: 63 MMHG | OXYGEN SATURATION: 98 % | HEIGHT: 65 IN | TEMPERATURE: 98 F | BODY MASS INDEX: 36 KG/M2 | SYSTOLIC BLOOD PRESSURE: 138 MMHG | HEART RATE: 76 BPM | RESPIRATION RATE: 18 BRPM

## 2021-02-13 DIAGNOSIS — E11.42 TYPE 2 DIABETES MELLITUS WITH DIABETIC POLYNEUROPATHY, WITHOUT LONG-TERM CURRENT USE OF INSULIN: Primary | ICD-10-CM

## 2021-02-13 LAB
ALBUMIN SERPL BCP-MCNC: 3.5 G/DL (ref 3.5–5.2)
ALP SERPL-CCNC: 82 U/L (ref 55–135)
ALT SERPL W/O P-5'-P-CCNC: 17 U/L (ref 10–44)
ANION GAP SERPL CALC-SCNC: 11 MMOL/L (ref 8–16)
AST SERPL-CCNC: 11 U/L (ref 10–40)
BASOPHILS # BLD AUTO: 0.15 K/UL (ref 0–0.2)
BASOPHILS NFR BLD: 1.3 % (ref 0–1.9)
BILIRUB SERPL-MCNC: 0.1 MG/DL (ref 0.1–1)
BUN SERPL-MCNC: 12 MG/DL (ref 6–20)
CALCIUM SERPL-MCNC: 9 MG/DL (ref 8.7–10.5)
CHLORIDE SERPL-SCNC: 96 MMOL/L (ref 95–110)
CO2 SERPL-SCNC: 25 MMOL/L (ref 23–29)
CREAT SERPL-MCNC: 0.6 MG/DL (ref 0.5–1.4)
DIFFERENTIAL METHOD: ABNORMAL
EOSINOPHIL # BLD AUTO: 0.5 K/UL (ref 0–0.5)
EOSINOPHIL NFR BLD: 4.4 % (ref 0–8)
ERYTHROCYTE [DISTWIDTH] IN BLOOD BY AUTOMATED COUNT: 16.5 % (ref 11.5–14.5)
EST. GFR  (AFRICAN AMERICAN): >60 ML/MIN/1.73 M^2
EST. GFR  (NON AFRICAN AMERICAN): >60 ML/MIN/1.73 M^2
GLUCOSE SERPL-MCNC: 154 MG/DL (ref 70–110)
HCT VFR BLD AUTO: 34.6 % (ref 37–48.5)
HGB BLD-MCNC: 11 G/DL (ref 12–16)
IMM GRANULOCYTES # BLD AUTO: 0.07 K/UL (ref 0–0.04)
IMM GRANULOCYTES NFR BLD AUTO: 0.6 % (ref 0–0.5)
LYMPHOCYTES # BLD AUTO: 5 K/UL (ref 1–4.8)
LYMPHOCYTES NFR BLD: 41.9 % (ref 18–48)
MAGNESIUM SERPL-MCNC: 1.5 MG/DL (ref 1.6–2.6)
MCH RBC QN AUTO: 27.2 PG (ref 27–31)
MCHC RBC AUTO-ENTMCNC: 31.8 G/DL (ref 32–36)
MCV RBC AUTO: 85 FL (ref 82–98)
MONOCYTES # BLD AUTO: 1.3 K/UL (ref 0.3–1)
MONOCYTES NFR BLD: 11.2 % (ref 4–15)
NEUTROPHILS # BLD AUTO: 4.8 K/UL (ref 1.8–7.7)
NEUTROPHILS NFR BLD: 40.6 % (ref 38–73)
NRBC BLD-RTO: 0 /100 WBC
PHOSPHATE SERPL-MCNC: 3.8 MG/DL (ref 2.7–4.5)
PLATELET # BLD AUTO: 655 K/UL (ref 150–350)
PLATELET BLD QL SMEAR: ABNORMAL
PMV BLD AUTO: 10.2 FL (ref 9.2–12.9)
POCT GLUCOSE: 117 MG/DL (ref 70–110)
POCT GLUCOSE: 150 MG/DL (ref 70–110)
POCT GLUCOSE: 165 MG/DL (ref 70–110)
POCT GLUCOSE: 177 MG/DL (ref 70–110)
POTASSIUM SERPL-SCNC: 4.7 MMOL/L (ref 3.5–5.1)
PROT SERPL-MCNC: 6.6 G/DL (ref 6–8.4)
RBC # BLD AUTO: 4.05 M/UL (ref 4–5.4)
SCHISTOCYTES BLD QL SMEAR: ABNORMAL
SMUDGE CELLS BLD QL SMEAR: PRESENT
SODIUM SERPL-SCNC: 132 MMOL/L (ref 136–145)
WBC # BLD AUTO: 11.88 K/UL (ref 3.9–12.7)

## 2021-02-13 PROCEDURE — 94761 N-INVAS EAR/PLS OXIMETRY MLT: CPT

## 2021-02-13 PROCEDURE — 84100 ASSAY OF PHOSPHORUS: CPT

## 2021-02-13 PROCEDURE — 93010 ELECTROCARDIOGRAM REPORT: CPT | Mod: ,,, | Performed by: INTERNAL MEDICINE

## 2021-02-13 PROCEDURE — S4991 NICOTINE PATCH NONLEGEND: HCPCS | Performed by: HOSPITALIST

## 2021-02-13 PROCEDURE — 94640 AIRWAY INHALATION TREATMENT: CPT

## 2021-02-13 PROCEDURE — 36415 COLL VENOUS BLD VENIPUNCTURE: CPT

## 2021-02-13 PROCEDURE — 83735 ASSAY OF MAGNESIUM: CPT

## 2021-02-13 PROCEDURE — 99225 PR SUBSEQUENT OBSERVATION CARE,LEVEL II: ICD-10-PCS | Mod: ,,, | Performed by: HOSPITALIST

## 2021-02-13 PROCEDURE — 99225 PR SUBSEQUENT OBSERVATION CARE,LEVEL II: CPT | Mod: ,,, | Performed by: HOSPITALIST

## 2021-02-13 PROCEDURE — 85025 COMPLETE CBC W/AUTO DIFF WBC: CPT

## 2021-02-13 PROCEDURE — 93005 ELECTROCARDIOGRAM TRACING: CPT

## 2021-02-13 PROCEDURE — 25000003 PHARM REV CODE 250: Performed by: HOSPITALIST

## 2021-02-13 PROCEDURE — 93010 EKG 12-LEAD: ICD-10-PCS | Mod: ,,, | Performed by: INTERNAL MEDICINE

## 2021-02-13 PROCEDURE — 80053 COMPREHEN METABOLIC PANEL: CPT

## 2021-02-13 PROCEDURE — G0378 HOSPITAL OBSERVATION PER HR: HCPCS

## 2021-02-13 RX ORDER — HYDROXYZINE HYDROCHLORIDE 25 MG/1
25 TABLET, FILM COATED ORAL 3 TIMES DAILY PRN
Status: DISCONTINUED | OUTPATIENT
Start: 2021-02-13 | End: 2021-02-13 | Stop reason: HOSPADM

## 2021-02-13 RX ADMIN — HALOPERIDOL 2 MG: 1 TABLET ORAL at 09:02

## 2021-02-13 RX ADMIN — THERA TABS 1 TABLET: TAB at 09:02

## 2021-02-13 RX ADMIN — CYANOCOBALAMIN TAB 250 MCG 250 MCG: 250 TAB at 09:02

## 2021-02-13 RX ADMIN — FAMOTIDINE 20 MG: 20 TABLET, FILM COATED ORAL at 09:02

## 2021-02-13 RX ADMIN — FLUTICASONE FUROATE AND VILANTEROL TRIFENATATE 1 PUFF: 100; 25 POWDER RESPIRATORY (INHALATION) at 09:02

## 2021-02-13 RX ADMIN — LISINOPRIL 5 MG: 2.5 TABLET ORAL at 09:02

## 2021-02-13 RX ADMIN — APIXABAN 5 MG: 5 TABLET, FILM COATED ORAL at 09:02

## 2021-02-13 RX ADMIN — ASPIRIN 81 MG: 81 TABLET, COATED ORAL at 09:02

## 2021-02-13 RX ADMIN — GABAPENTIN 300 MG: 300 CAPSULE ORAL at 09:02

## 2021-02-13 RX ADMIN — CLONIDINE HYDROCHLORIDE 0.1 MG: 0.1 TABLET ORAL at 09:02

## 2021-02-13 RX ADMIN — MICONAZOLE NITRATE: 20 CREAM TOPICAL at 09:02

## 2021-02-13 RX ADMIN — DOCUSATE SODIUM 50MG AND SENNOSIDES 8.6MG 1 TABLET: 8.6; 5 TABLET, FILM COATED ORAL at 09:02

## 2021-02-13 RX ADMIN — ACETAMINOPHEN 650 MG: 325 TABLET ORAL at 04:02

## 2021-02-13 RX ADMIN — DIVALPROEX SODIUM 1000 MG: 250 TABLET, DELAYED RELEASE ORAL at 09:02

## 2021-02-13 RX ADMIN — DOXYCYCLINE HYCLATE 100 MG: 100 TABLET, COATED ORAL at 09:02

## 2021-02-13 RX ADMIN — GABAPENTIN 300 MG: 300 CAPSULE ORAL at 03:02

## 2021-02-13 RX ADMIN — CLONAZEPAM 1 MG: 0.5 TABLET ORAL at 09:02

## 2021-02-13 RX ADMIN — PRAVASTATIN SODIUM 40 MG: 40 TABLET ORAL at 09:02

## 2021-02-13 RX ADMIN — CICLOPIROX OLAMINE: 7.7 CREAM TOPICAL at 09:02

## 2021-02-13 RX ADMIN — HYDROXYZINE HYDROCHLORIDE 25 MG: 25 TABLET, FILM COATED ORAL at 03:02

## 2021-02-13 RX ADMIN — METOPROLOL TARTRATE 50 MG: 50 TABLET, FILM COATED ORAL at 09:02

## 2021-02-13 RX ADMIN — NICOTINE 1 PATCH: 14 PATCH TRANSDERMAL at 09:02

## 2021-02-14 ENCOUNTER — TELEPHONE (OUTPATIENT)
Dept: HEPATOLOGY | Facility: CLINIC | Age: 51
End: 2021-02-14

## 2021-02-14 PROBLEM — F29 PSYCHOSIS: Status: ACTIVE | Noted: 2021-02-14

## 2021-02-15 LAB
BACTERIA BLD CULT: NORMAL
BACTERIA BLD CULT: NORMAL
BACTERIA SPEC ANAEROBE CULT: ABNORMAL

## 2021-02-25 ENCOUNTER — OFFICE VISIT (OUTPATIENT)
Dept: PODIATRY | Facility: CLINIC | Age: 51
End: 2021-02-25
Payer: MEDICAID

## 2021-02-25 VITALS — BODY MASS INDEX: 37.83 KG/M2 | HEIGHT: 65 IN | WEIGHT: 227.06 LBS

## 2021-02-25 DIAGNOSIS — L97.422 LEFT MIDFOOT ULCER, WITH FAT LAYER EXPOSED: ICD-10-CM

## 2021-02-25 DIAGNOSIS — E11.49 TYPE II DIABETES MELLITUS WITH NEUROLOGICAL MANIFESTATIONS: Primary | ICD-10-CM

## 2021-02-25 PROCEDURE — 99214 PR OFFICE/OUTPT VISIT, EST, LEVL IV, 30-39 MIN: ICD-10-PCS | Mod: S$PBB,,, | Performed by: PODIATRIST

## 2021-02-25 PROCEDURE — 99214 OFFICE O/P EST MOD 30 MIN: CPT | Mod: S$PBB,,, | Performed by: PODIATRIST

## 2021-02-25 PROCEDURE — 99999 PR PBB SHADOW E&M-EST. PATIENT-LVL IV: ICD-10-PCS | Mod: PBBFAC,,, | Performed by: PODIATRIST

## 2021-02-25 PROCEDURE — 99214 OFFICE O/P EST MOD 30 MIN: CPT | Mod: PBBFAC,PO | Performed by: PODIATRIST

## 2021-02-25 PROCEDURE — 99999 PR PBB SHADOW E&M-EST. PATIENT-LVL IV: CPT | Mod: PBBFAC,,, | Performed by: PODIATRIST

## 2021-02-26 DIAGNOSIS — F41.9 ANXIETY DUE TO INVASIVE PROCEDURE: ICD-10-CM

## 2021-02-26 DIAGNOSIS — Z91.89 AT HIGH RISK FOR PAIN FROM PROCEDURE: Primary | ICD-10-CM

## 2021-02-26 RX ORDER — MELOXICAM 7.5 MG/1
7.5 TABLET ORAL 2 TIMES DAILY
Qty: 14 TABLET | Refills: 0 | Status: CANCELLED | OUTPATIENT
Start: 2021-02-26 | End: 2021-03-05

## 2021-02-26 RX ORDER — ALPRAZOLAM 0.5 MG/1
TABLET ORAL
Qty: 2 TABLET | Refills: 0 | Status: CANCELLED | OUTPATIENT
Start: 2021-02-26

## 2021-03-01 ENCOUNTER — TELEPHONE (OUTPATIENT)
Dept: VASCULAR SURGERY | Facility: CLINIC | Age: 51
End: 2021-03-01

## 2021-03-03 ENCOUNTER — OFFICE VISIT (OUTPATIENT)
Dept: RHEUMATOLOGY | Facility: CLINIC | Age: 51
End: 2021-03-03
Payer: MEDICAID

## 2021-03-03 VITALS
HEART RATE: 76 BPM | WEIGHT: 226.44 LBS | SYSTOLIC BLOOD PRESSURE: 123 MMHG | BODY MASS INDEX: 37.68 KG/M2 | DIASTOLIC BLOOD PRESSURE: 72 MMHG

## 2021-03-03 DIAGNOSIS — E11.42 TYPE 2 DIABETES MELLITUS WITH DIABETIC POLYNEUROPATHY, WITHOUT LONG-TERM CURRENT USE OF INSULIN: ICD-10-CM

## 2021-03-03 DIAGNOSIS — K76.0 FATTY LIVER: ICD-10-CM

## 2021-03-03 DIAGNOSIS — F31.9 BIPOLAR 1 DISORDER: ICD-10-CM

## 2021-03-03 DIAGNOSIS — I82.409 RECURRENT DEEP VEIN THROMBOSIS (DVT): ICD-10-CM

## 2021-03-03 DIAGNOSIS — Z86.711 HISTORY OF PULMONARY EMBOLISM: ICD-10-CM

## 2021-03-03 DIAGNOSIS — G89.29 OTHER CHRONIC PAIN: Primary | ICD-10-CM

## 2021-03-03 DIAGNOSIS — I10 ESSENTIAL HYPERTENSION: ICD-10-CM

## 2021-03-03 DIAGNOSIS — D72.829 LEUKOCYTOSIS, UNSPECIFIED TYPE: ICD-10-CM

## 2021-03-03 DIAGNOSIS — E11.42 DIABETIC POLYNEUROPATHY ASSOCIATED WITH TYPE 2 DIABETES MELLITUS: ICD-10-CM

## 2021-03-03 DIAGNOSIS — E78.1 HYPERTRIGLYCERIDEMIA: ICD-10-CM

## 2021-03-03 DIAGNOSIS — J44.9 CHRONIC OBSTRUCTIVE PULMONARY DISEASE, UNSPECIFIED COPD TYPE: ICD-10-CM

## 2021-03-03 PROCEDURE — 99999 PR PBB SHADOW E&M-EST. PATIENT-LVL III: ICD-10-PCS | Mod: PBBFAC,,, | Performed by: STUDENT IN AN ORGANIZED HEALTH CARE EDUCATION/TRAINING PROGRAM

## 2021-03-03 PROCEDURE — 99999 PR PBB SHADOW E&M-EST. PATIENT-LVL III: CPT | Mod: PBBFAC,,, | Performed by: STUDENT IN AN ORGANIZED HEALTH CARE EDUCATION/TRAINING PROGRAM

## 2021-03-03 PROCEDURE — 99213 OFFICE O/P EST LOW 20 MIN: CPT | Mod: PBBFAC | Performed by: STUDENT IN AN ORGANIZED HEALTH CARE EDUCATION/TRAINING PROGRAM

## 2021-03-03 PROCEDURE — 99214 OFFICE O/P EST MOD 30 MIN: CPT | Mod: S$PBB,,, | Performed by: STUDENT IN AN ORGANIZED HEALTH CARE EDUCATION/TRAINING PROGRAM

## 2021-03-03 PROCEDURE — 99214 PR OFFICE/OUTPT VISIT, EST, LEVL IV, 30-39 MIN: ICD-10-PCS | Mod: S$PBB,,, | Performed by: STUDENT IN AN ORGANIZED HEALTH CARE EDUCATION/TRAINING PROGRAM

## 2021-03-03 RX ORDER — CARBAMAZEPINE 200 MG/1
200 TABLET ORAL 2 TIMES DAILY
Status: ON HOLD | COMMUNITY
Start: 2021-02-26 | End: 2022-05-13 | Stop reason: HOSPADM

## 2021-03-04 ENCOUNTER — OFFICE VISIT (OUTPATIENT)
Dept: PODIATRY | Facility: CLINIC | Age: 51
End: 2021-03-04
Payer: MEDICAID

## 2021-03-04 VITALS — BODY MASS INDEX: 37.65 KG/M2 | WEIGHT: 226 LBS | HEIGHT: 65 IN

## 2021-03-04 DIAGNOSIS — L97.422 LEFT MIDFOOT ULCER, WITH FAT LAYER EXPOSED: ICD-10-CM

## 2021-03-04 DIAGNOSIS — E11.49 TYPE II DIABETES MELLITUS WITH NEUROLOGICAL MANIFESTATIONS: Primary | ICD-10-CM

## 2021-03-04 PROCEDURE — 99999 PR PBB SHADOW E&M-EST. PATIENT-LVL II: CPT | Mod: PBBFAC,,, | Performed by: PODIATRIST

## 2021-03-04 PROCEDURE — 99213 OFFICE O/P EST LOW 20 MIN: CPT | Mod: S$PBB,,, | Performed by: PODIATRIST

## 2021-03-04 PROCEDURE — 99999 PR PBB SHADOW E&M-EST. PATIENT-LVL II: ICD-10-PCS | Mod: PBBFAC,,, | Performed by: PODIATRIST

## 2021-03-04 PROCEDURE — 99213 PR OFFICE/OUTPT VISIT, EST, LEVL III, 20-29 MIN: ICD-10-PCS | Mod: S$PBB,,, | Performed by: PODIATRIST

## 2021-03-04 PROCEDURE — 99212 OFFICE O/P EST SF 10 MIN: CPT | Mod: PBBFAC,PO | Performed by: PODIATRIST

## 2021-03-11 ENCOUNTER — OFFICE VISIT (OUTPATIENT)
Dept: PODIATRY | Facility: CLINIC | Age: 51
End: 2021-03-11
Payer: MEDICAID

## 2021-03-11 VITALS — BODY MASS INDEX: 37.65 KG/M2 | WEIGHT: 226 LBS | HEIGHT: 65 IN

## 2021-03-11 DIAGNOSIS — M20.5X2 HALLUX LIMITUS, ACQUIRED, LEFT: ICD-10-CM

## 2021-03-11 DIAGNOSIS — M20.5X1 HALLUX LIMITUS, ACQUIRED, RIGHT: ICD-10-CM

## 2021-03-11 DIAGNOSIS — L97.422 LEFT MIDFOOT ULCER, WITH FAT LAYER EXPOSED: ICD-10-CM

## 2021-03-11 DIAGNOSIS — M20.41 HAMMER TOES OF BOTH FEET: ICD-10-CM

## 2021-03-11 DIAGNOSIS — E11.49 TYPE II DIABETES MELLITUS WITH NEUROLOGICAL MANIFESTATIONS: Primary | ICD-10-CM

## 2021-03-11 DIAGNOSIS — M20.12 HALLUX ABDUCTO VALGUS, LEFT: ICD-10-CM

## 2021-03-11 DIAGNOSIS — M20.42 HAMMER TOES OF BOTH FEET: ICD-10-CM

## 2021-03-11 PROCEDURE — 99999 PR PBB SHADOW E&M-EST. PATIENT-LVL IV: ICD-10-PCS | Mod: PBBFAC,,, | Performed by: PODIATRIST

## 2021-03-11 PROCEDURE — 99999 PR PBB SHADOW E&M-EST. PATIENT-LVL IV: CPT | Mod: PBBFAC,,, | Performed by: PODIATRIST

## 2021-03-11 PROCEDURE — 99214 PR OFFICE/OUTPT VISIT, EST, LEVL IV, 30-39 MIN: ICD-10-PCS | Mod: S$PBB,,, | Performed by: PODIATRIST

## 2021-03-11 PROCEDURE — 99214 OFFICE O/P EST MOD 30 MIN: CPT | Mod: PBBFAC,PO | Performed by: PODIATRIST

## 2021-03-11 PROCEDURE — 99214 OFFICE O/P EST MOD 30 MIN: CPT | Mod: S$PBB,,, | Performed by: PODIATRIST

## 2021-03-16 ENCOUNTER — TELEPHONE (OUTPATIENT)
Dept: FAMILY MEDICINE | Facility: CLINIC | Age: 51
End: 2021-03-16

## 2021-03-17 ENCOUNTER — TELEPHONE (OUTPATIENT)
Dept: PODIATRY | Facility: CLINIC | Age: 51
End: 2021-03-17

## 2021-03-18 ENCOUNTER — OFFICE VISIT (OUTPATIENT)
Dept: PODIATRY | Facility: CLINIC | Age: 51
End: 2021-03-18
Payer: MEDICAID

## 2021-03-18 VITALS — HEIGHT: 65 IN | BODY MASS INDEX: 37.65 KG/M2 | WEIGHT: 226 LBS

## 2021-03-18 DIAGNOSIS — E11.49 TYPE II DIABETES MELLITUS WITH NEUROLOGICAL MANIFESTATIONS: Primary | ICD-10-CM

## 2021-03-18 DIAGNOSIS — L97.422 LEFT MIDFOOT ULCER, WITH FAT LAYER EXPOSED: ICD-10-CM

## 2021-03-18 PROCEDURE — 99999 PR PBB SHADOW E&M-EST. PATIENT-LVL III: CPT | Mod: PBBFAC,,, | Performed by: PODIATRIST

## 2021-03-18 PROCEDURE — 99213 OFFICE O/P EST LOW 20 MIN: CPT | Mod: S$PBB,,, | Performed by: PODIATRIST

## 2021-03-18 PROCEDURE — 99213 PR OFFICE/OUTPT VISIT, EST, LEVL III, 20-29 MIN: ICD-10-PCS | Mod: S$PBB,,, | Performed by: PODIATRIST

## 2021-03-18 PROCEDURE — 99213 OFFICE O/P EST LOW 20 MIN: CPT | Mod: PBBFAC,PO | Performed by: PODIATRIST

## 2021-03-18 PROCEDURE — 99999 PR PBB SHADOW E&M-EST. PATIENT-LVL III: ICD-10-PCS | Mod: PBBFAC,,, | Performed by: PODIATRIST

## 2021-03-22 ENCOUNTER — TELEPHONE (OUTPATIENT)
Dept: FAMILY MEDICINE | Facility: CLINIC | Age: 51
End: 2021-03-22

## 2021-03-22 DIAGNOSIS — M54.50 CHRONIC BILATERAL LOW BACK PAIN WITHOUT SCIATICA: ICD-10-CM

## 2021-03-22 DIAGNOSIS — G89.29 CHRONIC BILATERAL LOW BACK PAIN WITHOUT SCIATICA: ICD-10-CM

## 2021-03-22 RX ORDER — MELOXICAM 15 MG/1
15 TABLET ORAL DAILY
Qty: 30 TABLET | Refills: 2 | Status: SHIPPED | OUTPATIENT
Start: 2021-03-22 | End: 2021-05-27 | Stop reason: SDUPTHER

## 2021-03-25 ENCOUNTER — TELEPHONE (OUTPATIENT)
Dept: PODIATRY | Facility: CLINIC | Age: 51
End: 2021-03-25

## 2021-03-26 LAB
BUN SERPL-MCNC: 7 MG/DL (ref 6–30)
CHLORIDE SERPL-SCNC: 88 MMOL/L (ref 95–110)
CREAT SERPL-MCNC: 0.5 MG/DL (ref 0.5–1.4)
GLUCOSE SERPL-MCNC: 123 MG/DL (ref 70–110)
HCT VFR BLD CALC: 37 %PCV (ref 36–54)
POC IONIZED CALCIUM: 1.08 MMOL/L (ref 1.06–1.42)
POC TCO2 (MEASURED): 32 MMOL/L (ref 23–29)
POTASSIUM BLD-SCNC: 3.6 MMOL/L (ref 3.5–5.1)
SAMPLE: ABNORMAL
SODIUM BLD-SCNC: 127 MMOL/L (ref 136–145)

## 2021-03-29 ENCOUNTER — OFFICE VISIT (OUTPATIENT)
Dept: PODIATRY | Facility: CLINIC | Age: 51
End: 2021-03-29
Payer: MEDICAID

## 2021-03-29 VITALS — WEIGHT: 226 LBS | BODY MASS INDEX: 37.65 KG/M2 | HEIGHT: 65 IN

## 2021-03-29 DIAGNOSIS — E11.49 TYPE II DIABETES MELLITUS WITH NEUROLOGICAL MANIFESTATIONS: Primary | ICD-10-CM

## 2021-03-29 DIAGNOSIS — L97.422 LEFT MIDFOOT ULCER, WITH FAT LAYER EXPOSED: ICD-10-CM

## 2021-03-29 PROCEDURE — 99999 PR PBB SHADOW E&M-EST. PATIENT-LVL V: CPT | Mod: PBBFAC,,, | Performed by: PODIATRIST

## 2021-03-29 PROCEDURE — 99215 OFFICE O/P EST HI 40 MIN: CPT | Mod: PBBFAC,PO | Performed by: PODIATRIST

## 2021-03-29 PROCEDURE — 99213 OFFICE O/P EST LOW 20 MIN: CPT | Mod: S$PBB,,, | Performed by: PODIATRIST

## 2021-03-29 PROCEDURE — 99999 PR PBB SHADOW E&M-EST. PATIENT-LVL V: ICD-10-PCS | Mod: PBBFAC,,, | Performed by: PODIATRIST

## 2021-03-29 PROCEDURE — 99213 PR OFFICE/OUTPT VISIT, EST, LEVL III, 20-29 MIN: ICD-10-PCS | Mod: S$PBB,,, | Performed by: PODIATRIST

## 2021-04-04 ENCOUNTER — HOSPITAL ENCOUNTER (EMERGENCY)
Facility: HOSPITAL | Age: 51
Discharge: PSYCHIATRIC OR SUBSTANCE USE FACILITY | End: 2021-04-04
Attending: EMERGENCY MEDICINE
Payer: MEDICAID

## 2021-04-04 VITALS
TEMPERATURE: 98 F | BODY MASS INDEX: 32.44 KG/M2 | DIASTOLIC BLOOD PRESSURE: 66 MMHG | SYSTOLIC BLOOD PRESSURE: 152 MMHG | RESPIRATION RATE: 18 BRPM | HEIGHT: 64 IN | OXYGEN SATURATION: 100 % | WEIGHT: 190 LBS | HEART RATE: 102 BPM

## 2021-04-04 DIAGNOSIS — L03.116 CELLULITIS OF LEFT FOOT: ICD-10-CM

## 2021-04-04 DIAGNOSIS — M79.672 LEFT FOOT PAIN: ICD-10-CM

## 2021-04-04 DIAGNOSIS — Z76.89 ENCOUNTER FOR PSYCHIATRIC ASSESSMENT: ICD-10-CM

## 2021-04-04 DIAGNOSIS — F29 PSYCHOSIS, UNSPECIFIED PSYCHOSIS TYPE: Primary | ICD-10-CM

## 2021-04-04 LAB
ALBUMIN SERPL BCP-MCNC: 3.9 G/DL (ref 3.5–5.2)
ALP SERPL-CCNC: 96 U/L (ref 55–135)
ALT SERPL W/O P-5'-P-CCNC: 17 U/L (ref 10–44)
AMPHET+METHAMPHET UR QL: NEGATIVE
ANION GAP SERPL CALC-SCNC: 12 MMOL/L (ref 8–16)
APAP SERPL-MCNC: <3 UG/ML (ref 10–20)
AST SERPL-CCNC: 17 U/L (ref 10–40)
BARBITURATES UR QL SCN>200 NG/ML: NEGATIVE
BASOPHILS # BLD AUTO: 0.07 K/UL (ref 0–0.2)
BASOPHILS NFR BLD: 0.7 % (ref 0–1.9)
BENZODIAZ UR QL SCN>200 NG/ML: NEGATIVE
BILIRUB SERPL-MCNC: 0.5 MG/DL (ref 0.1–1)
BILIRUB UR QL STRIP: NEGATIVE
BUN SERPL-MCNC: 6 MG/DL (ref 6–20)
BZE UR QL SCN: NEGATIVE
CALCIUM SERPL-MCNC: 9.1 MG/DL (ref 8.7–10.5)
CANNABINOIDS UR QL SCN: NEGATIVE
CHLORIDE SERPL-SCNC: 97 MMOL/L (ref 95–110)
CK SERPL-CCNC: 152 U/L (ref 20–180)
CLARITY UR REFRACT.AUTO: CLEAR
CO2 SERPL-SCNC: 24 MMOL/L (ref 23–29)
COLOR UR AUTO: ABNORMAL
CREAT SERPL-MCNC: 0.6 MG/DL (ref 0.5–1.4)
CREAT UR-MCNC: 14 MG/DL (ref 15–325)
CRP SERPL-MCNC: 39.3 MG/L (ref 0–8.2)
CTP QC/QA: YES
DIFFERENTIAL METHOD: ABNORMAL
EOSINOPHIL # BLD AUTO: 0 K/UL (ref 0–0.5)
EOSINOPHIL NFR BLD: 0.3 % (ref 0–8)
ERYTHROCYTE [DISTWIDTH] IN BLOOD BY AUTOMATED COUNT: 15.9 % (ref 11.5–14.5)
ERYTHROCYTE [SEDIMENTATION RATE] IN BLOOD BY WESTERGREN METHOD: 24 MM/HR (ref 0–36)
EST. GFR  (AFRICAN AMERICAN): >60 ML/MIN/1.73 M^2
EST. GFR  (NON AFRICAN AMERICAN): >60 ML/MIN/1.73 M^2
ETHANOL SERPL-MCNC: <10 MG/DL
GLUCOSE SERPL-MCNC: 128 MG/DL (ref 70–110)
GLUCOSE UR QL STRIP: NEGATIVE
HCT VFR BLD AUTO: 36.5 % (ref 37–48.5)
HGB BLD-MCNC: 12.1 G/DL (ref 12–16)
HGB UR QL STRIP: NEGATIVE
IMM GRANULOCYTES # BLD AUTO: 0.04 K/UL (ref 0–0.04)
IMM GRANULOCYTES NFR BLD AUTO: 0.4 % (ref 0–0.5)
KETONES UR QL STRIP: ABNORMAL
LEUKOCYTE ESTERASE UR QL STRIP: NEGATIVE
LYMPHOCYTES # BLD AUTO: 2.3 K/UL (ref 1–4.8)
LYMPHOCYTES NFR BLD: 22.8 % (ref 18–48)
MCH RBC QN AUTO: 27.7 PG (ref 27–31)
MCHC RBC AUTO-ENTMCNC: 33.2 G/DL (ref 32–36)
MCV RBC AUTO: 84 FL (ref 82–98)
METHADONE UR QL SCN>300 NG/ML: NEGATIVE
MONOCYTES # BLD AUTO: 1.4 K/UL (ref 0.3–1)
MONOCYTES NFR BLD: 13.4 % (ref 4–15)
NEUTROPHILS # BLD AUTO: 6.3 K/UL (ref 1.8–7.7)
NEUTROPHILS NFR BLD: 62.4 % (ref 38–73)
NITRITE UR QL STRIP: NEGATIVE
NRBC BLD-RTO: 0 /100 WBC
OPIATES UR QL SCN: NEGATIVE
PCP UR QL SCN>25 NG/ML: NEGATIVE
PH UR STRIP: 7 [PH] (ref 5–8)
PLATELET # BLD AUTO: 611 K/UL (ref 150–450)
PMV BLD AUTO: 9.3 FL (ref 9.2–12.9)
POTASSIUM SERPL-SCNC: 4.2 MMOL/L (ref 3.5–5.1)
PROT SERPL-MCNC: 7 G/DL (ref 6–8.4)
PROT UR QL STRIP: NEGATIVE
RBC # BLD AUTO: 4.37 M/UL (ref 4–5.4)
SARS-COV-2 RDRP RESP QL NAA+PROBE: NEGATIVE
SODIUM SERPL-SCNC: 133 MMOL/L (ref 136–145)
SP GR UR STRIP: 1 (ref 1–1.03)
TOXICOLOGY INFORMATION: ABNORMAL
TSH SERPL DL<=0.005 MIU/L-ACNC: 0.91 UIU/ML (ref 0.4–4)
URN SPEC COLLECT METH UR: ABNORMAL
WBC # BLD AUTO: 10.11 K/UL (ref 3.9–12.7)

## 2021-04-04 PROCEDURE — 86703 HIV-1/HIV-2 1 RESULT ANTBDY: CPT | Performed by: EMERGENCY MEDICINE

## 2021-04-04 PROCEDURE — 80053 COMPREHEN METABOLIC PANEL: CPT | Performed by: PHYSICIAN ASSISTANT

## 2021-04-04 PROCEDURE — U0002 COVID-19 LAB TEST NON-CDC: HCPCS | Performed by: PHYSICIAN ASSISTANT

## 2021-04-04 PROCEDURE — 25000003 PHARM REV CODE 250: Performed by: PHYSICIAN ASSISTANT

## 2021-04-04 PROCEDURE — 99285 PR EMERGENCY DEPT VISIT,LEVEL V: ICD-10-PCS | Mod: CS,,, | Performed by: EMERGENCY MEDICINE

## 2021-04-04 PROCEDURE — 80307 DRUG TEST PRSMV CHEM ANLYZR: CPT | Performed by: PHYSICIAN ASSISTANT

## 2021-04-04 PROCEDURE — 80143 DRUG ASSAY ACETAMINOPHEN: CPT | Performed by: PHYSICIAN ASSISTANT

## 2021-04-04 PROCEDURE — 84443 ASSAY THYROID STIM HORMONE: CPT | Performed by: PHYSICIAN ASSISTANT

## 2021-04-04 PROCEDURE — 82077 ASSAY SPEC XCP UR&BREATH IA: CPT | Performed by: PHYSICIAN ASSISTANT

## 2021-04-04 PROCEDURE — 86140 C-REACTIVE PROTEIN: CPT | Performed by: PHYSICIAN ASSISTANT

## 2021-04-04 PROCEDURE — 85652 RBC SED RATE AUTOMATED: CPT | Performed by: PHYSICIAN ASSISTANT

## 2021-04-04 PROCEDURE — 99285 EMERGENCY DEPT VISIT HI MDM: CPT | Mod: CS,,, | Performed by: EMERGENCY MEDICINE

## 2021-04-04 PROCEDURE — 86803 HEPATITIS C AB TEST: CPT | Performed by: EMERGENCY MEDICINE

## 2021-04-04 PROCEDURE — 99285 EMERGENCY DEPT VISIT HI MDM: CPT | Mod: 25

## 2021-04-04 PROCEDURE — 82550 ASSAY OF CK (CPK): CPT | Performed by: PHYSICIAN ASSISTANT

## 2021-04-04 PROCEDURE — 85025 COMPLETE CBC W/AUTO DIFF WBC: CPT | Performed by: PHYSICIAN ASSISTANT

## 2021-04-04 PROCEDURE — 81003 URINALYSIS AUTO W/O SCOPE: CPT | Mod: 59 | Performed by: PHYSICIAN ASSISTANT

## 2021-04-04 RX ORDER — LORAZEPAM 2 MG/ML
2 INJECTION INTRAMUSCULAR
Status: DISCONTINUED | OUTPATIENT
Start: 2021-04-04 | End: 2021-04-04

## 2021-04-04 RX ORDER — CEPHALEXIN 500 MG/1
500 CAPSULE ORAL 4 TIMES DAILY
Qty: 20 CAPSULE | Refills: 0 | Status: SHIPPED | OUTPATIENT
Start: 2021-04-04 | End: 2021-04-09

## 2021-04-04 RX ORDER — OLANZAPINE 5 MG/1
5 TABLET, ORALLY DISINTEGRATING ORAL
Status: DISCONTINUED | OUTPATIENT
Start: 2021-04-04 | End: 2021-04-04

## 2021-04-04 RX ORDER — OLANZAPINE 5 MG/1
5 TABLET, ORALLY DISINTEGRATING ORAL NIGHTLY
Status: DISCONTINUED | OUTPATIENT
Start: 2021-04-04 | End: 2021-04-04

## 2021-04-04 RX ORDER — SULFAMETHOXAZOLE AND TRIMETHOPRIM 800; 160 MG/1; MG/1
1 TABLET ORAL 2 TIMES DAILY
Qty: 14 TABLET | Refills: 0 | Status: SHIPPED | OUTPATIENT
Start: 2021-04-04 | End: 2021-04-11

## 2021-04-04 RX ORDER — OLANZAPINE 5 MG/1
10 TABLET, ORALLY DISINTEGRATING ORAL
Status: COMPLETED | OUTPATIENT
Start: 2021-04-04 | End: 2021-04-04

## 2021-04-04 RX ORDER — CEPHALEXIN 500 MG/1
500 CAPSULE ORAL
Status: COMPLETED | OUTPATIENT
Start: 2021-04-04 | End: 2021-04-04

## 2021-04-04 RX ORDER — SULFAMETHOXAZOLE AND TRIMETHOPRIM 800; 160 MG/1; MG/1
1 TABLET ORAL
Status: COMPLETED | OUTPATIENT
Start: 2021-04-04 | End: 2021-04-04

## 2021-04-04 RX ADMIN — CEPHALEXIN 500 MG: 500 CAPSULE ORAL at 06:04

## 2021-04-04 RX ADMIN — OLANZAPINE 10 MG: 5 TABLET, ORALLY DISINTEGRATING ORAL at 01:04

## 2021-04-04 RX ADMIN — SULFAMETHOXAZOLE AND TRIMETHOPRIM 1 TABLET: 800; 160 TABLET ORAL at 06:04

## 2021-04-05 LAB
HCV AB SERPL QL IA: NEGATIVE
HIV 1+2 AB+HIV1 P24 AG SERPL QL IA: NEGATIVE

## 2021-04-13 ENCOUNTER — TELEPHONE (OUTPATIENT)
Dept: SMOKING CESSATION | Facility: CLINIC | Age: 51
End: 2021-04-13

## 2021-05-05 DIAGNOSIS — Z12.31 OTHER SCREENING MAMMOGRAM: ICD-10-CM

## 2021-05-27 ENCOUNTER — OFFICE VISIT (OUTPATIENT)
Dept: PODIATRY | Facility: CLINIC | Age: 51
End: 2021-05-27
Payer: MEDICAID

## 2021-05-27 ENCOUNTER — HOSPITAL ENCOUNTER (OUTPATIENT)
Dept: RADIOLOGY | Facility: HOSPITAL | Age: 51
Discharge: HOME | End: 2021-05-27
Attending: PODIATRIST
Payer: MEDICAID

## 2021-05-27 ENCOUNTER — OFFICE VISIT (OUTPATIENT)
Dept: FAMILY MEDICINE | Facility: CLINIC | Age: 51
End: 2021-05-27
Payer: MEDICAID

## 2021-05-27 VITALS
HEIGHT: 64 IN | TEMPERATURE: 98 F | OXYGEN SATURATION: 97 % | SYSTOLIC BLOOD PRESSURE: 118 MMHG | WEIGHT: 216.69 LBS | HEART RATE: 102 BPM | DIASTOLIC BLOOD PRESSURE: 56 MMHG | BODY MASS INDEX: 36.99 KG/M2

## 2021-05-27 VITALS — HEIGHT: 64 IN | WEIGHT: 216 LBS | BODY MASS INDEX: 36.88 KG/M2

## 2021-05-27 DIAGNOSIS — L97.422 LEFT MIDFOOT ULCER, WITH FAT LAYER EXPOSED: ICD-10-CM

## 2021-05-27 DIAGNOSIS — E11.49 TYPE II DIABETES MELLITUS WITH NEUROLOGICAL MANIFESTATIONS: Primary | ICD-10-CM

## 2021-05-27 DIAGNOSIS — Z86.711 HISTORY OF PULMONARY EMBOLISM: ICD-10-CM

## 2021-05-27 DIAGNOSIS — G89.29 CHRONIC BILATERAL LOW BACK PAIN WITHOUT SCIATICA: ICD-10-CM

## 2021-05-27 DIAGNOSIS — Z79.01 LONG TERM (CURRENT) USE OF ANTICOAGULANTS: ICD-10-CM

## 2021-05-27 DIAGNOSIS — M54.50 CHRONIC BILATERAL LOW BACK PAIN WITHOUT SCIATICA: ICD-10-CM

## 2021-05-27 DIAGNOSIS — F31.9 BIPOLAR 1 DISORDER: Primary | Chronic | ICD-10-CM

## 2021-05-27 DIAGNOSIS — E11.40 CONTROLLED TYPE 2 DIABETES MELLITUS WITH NEUROPATHY: ICD-10-CM

## 2021-05-27 PROBLEM — K80.20 CALCULUS OF GALLBLADDER WITHOUT CHOLECYSTITIS WITHOUT OBSTRUCTION: Status: RESOLVED | Noted: 2020-09-05 | Resolved: 2021-05-27

## 2021-05-27 PROBLEM — K80.20 CHOLELITHIASIS: Status: RESOLVED | Noted: 2020-09-10 | Resolved: 2021-05-27

## 2021-05-27 PROCEDURE — 73630 XR FOOT COMPLETE 3 VIEW LEFT: ICD-10-PCS | Mod: 26,LT,, | Performed by: RADIOLOGY

## 2021-05-27 PROCEDURE — 99999 PR PBB SHADOW E&M-EST. PATIENT-LVL V: ICD-10-PCS | Mod: PBBFAC,,, | Performed by: INTERNAL MEDICINE

## 2021-05-27 PROCEDURE — 73630 X-RAY EXAM OF FOOT: CPT | Mod: 26,LT,, | Performed by: RADIOLOGY

## 2021-05-27 PROCEDURE — 99214 OFFICE O/P EST MOD 30 MIN: CPT | Mod: S$PBB,,, | Performed by: PODIATRIST

## 2021-05-27 PROCEDURE — 99999 PR PBB SHADOW E&M-EST. PATIENT-LVL V: CPT | Mod: PBBFAC,,, | Performed by: PODIATRIST

## 2021-05-27 PROCEDURE — 87076 CULTURE ANAEROBE IDENT EACH: CPT | Performed by: PODIATRIST

## 2021-05-27 PROCEDURE — 99999 PR PBB SHADOW E&M-EST. PATIENT-LVL V: ICD-10-PCS | Mod: PBBFAC,,, | Performed by: PODIATRIST

## 2021-05-27 PROCEDURE — 87075 CULTR BACTERIA EXCEPT BLOOD: CPT | Performed by: PODIATRIST

## 2021-05-27 PROCEDURE — 99215 OFFICE O/P EST HI 40 MIN: CPT | Mod: PBBFAC,25,27,PO | Performed by: PODIATRIST

## 2021-05-27 PROCEDURE — 73630 X-RAY EXAM OF FOOT: CPT | Mod: TC,FY,PO,LT

## 2021-05-27 PROCEDURE — 99214 OFFICE O/P EST MOD 30 MIN: CPT | Mod: S$PBB,,, | Performed by: INTERNAL MEDICINE

## 2021-05-27 PROCEDURE — 87070 CULTURE OTHR SPECIMN AEROBIC: CPT | Performed by: PODIATRIST

## 2021-05-27 PROCEDURE — 99214 PR OFFICE/OUTPT VISIT, EST, LEVL IV, 30-39 MIN: ICD-10-PCS | Mod: S$PBB,,, | Performed by: INTERNAL MEDICINE

## 2021-05-27 PROCEDURE — 99999 PR PBB SHADOW E&M-EST. PATIENT-LVL V: CPT | Mod: PBBFAC,,, | Performed by: INTERNAL MEDICINE

## 2021-05-27 PROCEDURE — 99214 PR OFFICE/OUTPT VISIT, EST, LEVL IV, 30-39 MIN: ICD-10-PCS | Mod: S$PBB,,, | Performed by: PODIATRIST

## 2021-05-27 PROCEDURE — 99215 OFFICE O/P EST HI 40 MIN: CPT | Mod: PBBFAC,25,PN | Performed by: INTERNAL MEDICINE

## 2021-05-27 RX ORDER — MELOXICAM 15 MG/1
15 TABLET ORAL DAILY
Qty: 30 TABLET | Refills: 2 | Status: SHIPPED | OUTPATIENT
Start: 2021-05-27 | End: 2021-09-30

## 2021-05-27 RX ORDER — DIVALPROEX SODIUM 500 MG/1
2500 TABLET, FILM COATED, EXTENDED RELEASE ORAL
Status: ON HOLD | COMMUNITY
Start: 2021-05-12 | End: 2022-04-18 | Stop reason: HOSPADM

## 2021-05-27 RX ORDER — HYDROXYZINE PAMOATE 50 MG/1
50 CAPSULE ORAL NIGHTLY PRN
Status: ON HOLD | COMMUNITY
Start: 2021-05-12 | End: 2022-04-13

## 2021-05-27 RX ORDER — METHOCARBAMOL 500 MG/1
500 TABLET, FILM COATED ORAL EVERY 8 HOURS PRN
Qty: 60 TABLET | Refills: 1 | Status: SHIPPED | OUTPATIENT
Start: 2021-05-27 | End: 2021-07-13

## 2021-05-27 RX ORDER — HALOPERIDOL 10 MG/1
10 TABLET ORAL
COMMUNITY
Start: 2021-05-12 | End: 2021-06-11

## 2021-05-27 RX ORDER — HYDROXYZINE HYDROCHLORIDE 25 MG/1
TABLET, FILM COATED ORAL
Status: ON HOLD | COMMUNITY
Start: 2021-04-06 | End: 2022-04-13 | Stop reason: SDUPTHER

## 2021-05-27 RX ORDER — CYCLOBENZAPRINE HYDROCHLORIDE 7.5 MG/1
7.5 TABLET, FILM COATED ORAL 3 TIMES DAILY PRN
COMMUNITY
Start: 2021-03-12 | End: 2021-05-27

## 2021-05-27 RX ORDER — HYDROXYZINE PAMOATE 50 MG/1
50 CAPSULE ORAL
COMMUNITY
Start: 2021-05-12 | End: 2021-06-11

## 2021-05-27 RX ORDER — PANTOPRAZOLE SODIUM 40 MG/1
40 TABLET, DELAYED RELEASE ORAL
COMMUNITY
Start: 2021-05-13 | End: 2021-07-12 | Stop reason: SDUPTHER

## 2021-05-27 RX ORDER — PANTOPRAZOLE SODIUM 40 MG/1
40 TABLET, DELAYED RELEASE ORAL EVERY MORNING
COMMUNITY
Start: 2021-05-07 | End: 2021-05-27 | Stop reason: SDUPTHER

## 2021-05-27 RX ORDER — FUROSEMIDE 20 MG/1
20 TABLET ORAL DAILY
COMMUNITY
Start: 2021-05-12 | End: 2021-07-19 | Stop reason: SDUPTHER

## 2021-05-27 RX ORDER — FUROSEMIDE 20 MG/1
20 TABLET ORAL
COMMUNITY
Start: 2021-05-12 | End: 2022-02-08

## 2021-05-27 RX ORDER — DOXYCYCLINE HYCLATE 100 MG
100 TABLET ORAL 2 TIMES DAILY
Qty: 20 TABLET | Refills: 0 | Status: SHIPPED | OUTPATIENT
Start: 2021-05-27 | End: 2021-09-30 | Stop reason: ALTCHOICE

## 2021-05-27 RX ORDER — ASPIRIN 81 MG/1
81 TABLET ORAL DAILY
COMMUNITY
Start: 2021-05-07 | End: 2021-07-12 | Stop reason: SDUPTHER

## 2021-05-27 RX ORDER — HALOPERIDOL 10 MG/1
10 TABLET ORAL 2 TIMES DAILY
Status: ON HOLD | COMMUNITY
Start: 2021-05-12 | End: 2022-04-18 | Stop reason: HOSPADM

## 2021-05-31 LAB — BACTERIA SPEC AEROBE CULT: NORMAL

## 2021-06-01 LAB — BACTERIA SPEC ANAEROBE CULT: ABNORMAL

## 2021-06-02 DIAGNOSIS — J44.9 CHRONIC OBSTRUCTIVE PULMONARY DISEASE, UNSPECIFIED COPD TYPE: Primary | ICD-10-CM

## 2021-06-02 DIAGNOSIS — J44.9 CHRONIC OBSTRUCTIVE PULMONARY DISEASE, UNSPECIFIED COPD TYPE: ICD-10-CM

## 2021-06-02 RX ORDER — FLUTICASONE PROPIONATE 50 MCG
1 SPRAY, SUSPENSION (ML) NASAL 2 TIMES DAILY
Qty: 16 G | Refills: 0 | Status: SHIPPED | OUTPATIENT
Start: 2021-06-02 | End: 2021-07-19 | Stop reason: SDUPTHER

## 2021-06-02 RX ORDER — FLUTICASONE FUROATE AND VILANTEROL 100; 25 UG/1; UG/1
1 POWDER RESPIRATORY (INHALATION) DAILY
Qty: 60 EACH | Refills: 2 | Status: SHIPPED | OUTPATIENT
Start: 2021-06-02 | End: 2021-06-02 | Stop reason: CLARIF

## 2021-06-02 RX ORDER — FLUTICASONE PROPIONATE AND SALMETEROL 250; 50 UG/1; UG/1
1 POWDER RESPIRATORY (INHALATION) 2 TIMES DAILY
Qty: 60 EACH | Refills: 2 | Status: SHIPPED | OUTPATIENT
Start: 2021-06-02 | End: 2021-11-16

## 2021-06-03 ENCOUNTER — OFFICE VISIT (OUTPATIENT)
Dept: PODIATRY | Facility: CLINIC | Age: 51
End: 2021-06-03
Payer: MEDICAID

## 2021-06-03 VITALS — BODY MASS INDEX: 36.89 KG/M2 | HEIGHT: 64 IN | WEIGHT: 216.06 LBS

## 2021-06-03 DIAGNOSIS — M20.42 HAMMER TOES OF BOTH FEET: ICD-10-CM

## 2021-06-03 DIAGNOSIS — M20.12 HALLUX ABDUCTO VALGUS, LEFT: ICD-10-CM

## 2021-06-03 DIAGNOSIS — L84 PRE-ULCERATIVE CALLUSES: ICD-10-CM

## 2021-06-03 DIAGNOSIS — E11.49 TYPE II DIABETES MELLITUS WITH NEUROLOGICAL MANIFESTATIONS: Primary | ICD-10-CM

## 2021-06-03 DIAGNOSIS — M20.41 HAMMER TOES OF BOTH FEET: ICD-10-CM

## 2021-06-03 DIAGNOSIS — Z87.2 HEALED ULCER OF LEFT FOOT ON EXAMINATION: ICD-10-CM

## 2021-06-03 DIAGNOSIS — M20.5X1 HALLUX LIMITUS, ACQUIRED, RIGHT: ICD-10-CM

## 2021-06-03 DIAGNOSIS — M20.5X2 HALLUX LIMITUS, ACQUIRED, LEFT: ICD-10-CM

## 2021-06-03 PROCEDURE — 99213 OFFICE O/P EST LOW 20 MIN: CPT | Mod: S$PBB,,, | Performed by: PODIATRIST

## 2021-06-03 PROCEDURE — 99213 PR OFFICE/OUTPT VISIT, EST, LEVL III, 20-29 MIN: ICD-10-PCS | Mod: S$PBB,,, | Performed by: PODIATRIST

## 2021-06-03 PROCEDURE — 99999 PR PBB SHADOW E&M-EST. PATIENT-LVL V: CPT | Mod: PBBFAC,,, | Performed by: PODIATRIST

## 2021-06-03 PROCEDURE — 99215 OFFICE O/P EST HI 40 MIN: CPT | Mod: PBBFAC,PO | Performed by: PODIATRIST

## 2021-06-03 PROCEDURE — 99999 PR PBB SHADOW E&M-EST. PATIENT-LVL V: ICD-10-PCS | Mod: PBBFAC,,, | Performed by: PODIATRIST

## 2021-06-14 ENCOUNTER — TELEPHONE (OUTPATIENT)
Dept: PODIATRY | Facility: CLINIC | Age: 51
End: 2021-06-14

## 2021-06-23 ENCOUNTER — OFFICE VISIT (OUTPATIENT)
Dept: VASCULAR SURGERY | Facility: CLINIC | Age: 51
End: 2021-06-23
Payer: MEDICAID

## 2021-06-23 VITALS
WEIGHT: 228.94 LBS | HEIGHT: 64 IN | DIASTOLIC BLOOD PRESSURE: 78 MMHG | SYSTOLIC BLOOD PRESSURE: 122 MMHG | BODY MASS INDEX: 39.09 KG/M2

## 2021-06-23 DIAGNOSIS — I89.0 LYMPHEDEMA OF BOTH LOWER EXTREMITIES: ICD-10-CM

## 2021-06-23 DIAGNOSIS — I87.2 VENOUS INSUFFICIENCY: Primary | ICD-10-CM

## 2021-06-23 PROCEDURE — 99999 PR PBB SHADOW E&M-EST. PATIENT-LVL V: CPT | Mod: PBBFAC,,, | Performed by: SURGERY

## 2021-06-23 PROCEDURE — 99999 PR PBB SHADOW E&M-EST. PATIENT-LVL V: ICD-10-PCS | Mod: PBBFAC,,, | Performed by: SURGERY

## 2021-06-23 PROCEDURE — 99215 OFFICE O/P EST HI 40 MIN: CPT | Mod: PBBFAC | Performed by: SURGERY

## 2021-06-23 PROCEDURE — 99214 OFFICE O/P EST MOD 30 MIN: CPT | Mod: S$PBB,,, | Performed by: SURGERY

## 2021-06-23 PROCEDURE — 99214 PR OFFICE/OUTPT VISIT, EST, LEVL IV, 30-39 MIN: ICD-10-PCS | Mod: S$PBB,,, | Performed by: SURGERY

## 2021-06-30 DIAGNOSIS — I87.2 VENOUS INSUFFICIENCY: Primary | ICD-10-CM

## 2021-07-06 DIAGNOSIS — I87.2 VENOUS INSUFFICIENCY: Primary | ICD-10-CM

## 2021-07-13 ENCOUNTER — TELEPHONE (OUTPATIENT)
Dept: VASCULAR SURGERY | Facility: CLINIC | Age: 51
End: 2021-07-13

## 2021-07-13 ENCOUNTER — TELEPHONE (OUTPATIENT)
Dept: PODIATRY | Facility: CLINIC | Age: 51
End: 2021-07-13

## 2021-07-13 ENCOUNTER — TELEPHONE (OUTPATIENT)
Dept: FAMILY MEDICINE | Facility: CLINIC | Age: 51
End: 2021-07-13

## 2021-07-13 ENCOUNTER — HOSPITAL ENCOUNTER (EMERGENCY)
Facility: HOSPITAL | Age: 51
Discharge: HOME | End: 2021-07-13
Attending: EMERGENCY MEDICINE
Payer: MEDICAID

## 2021-07-13 VITALS
HEART RATE: 79 BPM | RESPIRATION RATE: 20 BRPM | DIASTOLIC BLOOD PRESSURE: 76 MMHG | TEMPERATURE: 99 F | BODY MASS INDEX: 39.27 KG/M2 | HEIGHT: 64 IN | WEIGHT: 230 LBS | OXYGEN SATURATION: 97 % | SYSTOLIC BLOOD PRESSURE: 155 MMHG

## 2021-07-13 DIAGNOSIS — M79.604 RIGHT LEG PAIN: ICD-10-CM

## 2021-07-13 DIAGNOSIS — M79.605 LEFT LEG PAIN: Primary | ICD-10-CM

## 2021-07-13 LAB
ALBUMIN SERPL-MCNC: 3.5 G/DL (ref 3.3–5.5)
ALP SERPL-CCNC: 81 U/L (ref 42–141)
B-HCG UR QL: NEGATIVE
BILIRUB SERPL-MCNC: 0.4 MG/DL (ref 0.2–1.6)
BILIRUBIN, POC UA: NEGATIVE
BLOOD, POC UA: NEGATIVE
BUN SERPL-MCNC: 10 MG/DL (ref 7–22)
CALCIUM SERPL-MCNC: 9.4 MG/DL (ref 8–10.3)
CHLORIDE SERPL-SCNC: 98 MMOL/L (ref 98–108)
CLARITY, POC UA: CLEAR
COLOR, POC UA: YELLOW
CREAT SERPL-MCNC: 0.5 MG/DL (ref 0.6–1.2)
CTP QC/QA: YES
GLUCOSE SERPL-MCNC: 111 MG/DL (ref 73–118)
GLUCOSE, POC UA: NEGATIVE
KETONES, POC UA: ABNORMAL
LEUKOCYTE EST, POC UA: NEGATIVE
NITRITE, POC UA: NEGATIVE
PH UR STRIP: 8 [PH]
POC ALT (SGPT): 18 U/L (ref 10–47)
POC AST (SGOT): 20 U/L (ref 11–38)
POC TCO2: 35 MMOL/L (ref 18–33)
POCT GLUCOSE: 159 MG/DL (ref 70–110)
POTASSIUM BLD-SCNC: 4 MMOL/L (ref 3.6–5.1)
PROTEIN, POC UA: NEGATIVE
PROTEIN, POC: 6.6 G/DL (ref 6.4–8.1)
SODIUM BLD-SCNC: 137 MMOL/L (ref 128–145)
SPECIFIC GRAVITY, POC UA: 1.02
UROBILINOGEN, POC UA: 0.2 E.U./DL

## 2021-07-13 PROCEDURE — 85025 COMPLETE CBC W/AUTO DIFF WBC: CPT | Mod: ER

## 2021-07-13 PROCEDURE — 81025 URINE PREGNANCY TEST: CPT | Mod: ER | Performed by: NURSE PRACTITIONER

## 2021-07-13 PROCEDURE — 96374 THER/PROPH/DIAG INJ IV PUSH: CPT | Mod: ER

## 2021-07-13 PROCEDURE — 82962 GLUCOSE BLOOD TEST: CPT | Mod: 59,ER

## 2021-07-13 PROCEDURE — 80053 COMPREHEN METABOLIC PANEL: CPT | Mod: ER

## 2021-07-13 PROCEDURE — 99284 EMERGENCY DEPT VISIT MOD MDM: CPT | Mod: 25,ER

## 2021-07-13 PROCEDURE — 81003 URINALYSIS AUTO W/O SCOPE: CPT | Mod: ER

## 2021-07-13 PROCEDURE — 63600175 PHARM REV CODE 636 W HCPCS: Mod: ER | Performed by: EMERGENCY MEDICINE

## 2021-07-13 RX ORDER — LIDOCAINE 50 MG/G
1 PATCH TOPICAL DAILY
Qty: 15 PATCH | Refills: 0 | Status: SHIPPED | OUTPATIENT
Start: 2021-07-13 | End: 2021-09-30

## 2021-07-13 RX ORDER — DICLOFENAC SODIUM 10 MG/G
2 GEL TOPICAL 4 TIMES DAILY PRN
Qty: 1 TUBE | Refills: 0 | Status: SHIPPED | OUTPATIENT
Start: 2021-07-13 | End: 2021-10-01 | Stop reason: SDUPTHER

## 2021-07-13 RX ORDER — PANTOPRAZOLE SODIUM 40 MG/1
40 TABLET, DELAYED RELEASE ORAL DAILY
Qty: 30 TABLET | Refills: 0 | Status: SHIPPED | OUTPATIENT
Start: 2021-07-13 | End: 2022-09-27 | Stop reason: SDUPTHER

## 2021-07-13 RX ORDER — KETOROLAC TROMETHAMINE 30 MG/ML
15 INJECTION, SOLUTION INTRAMUSCULAR; INTRAVENOUS
Status: COMPLETED | OUTPATIENT
Start: 2021-07-13 | End: 2021-07-13

## 2021-07-13 RX ORDER — CYCLOBENZAPRINE HCL 10 MG
10 TABLET ORAL 3 TIMES DAILY PRN
Qty: 15 TABLET | Refills: 0 | Status: SHIPPED | OUTPATIENT
Start: 2021-07-13 | End: 2021-07-18

## 2021-07-13 RX ORDER — FOLIC ACID 1 MG/1
1 TABLET ORAL DAILY
Qty: 30 TABLET | Refills: 2 | Status: SHIPPED | OUTPATIENT
Start: 2021-07-13 | End: 2021-07-19 | Stop reason: SDUPTHER

## 2021-07-13 RX ORDER — ACETAMINOPHEN 500 MG
1000 TABLET ORAL EVERY 6 HOURS PRN
Qty: 30 TABLET | Refills: 0 | Status: SHIPPED | OUTPATIENT
Start: 2021-07-13 | End: 2023-05-15

## 2021-07-13 RX ORDER — PRAVASTATIN SODIUM 40 MG/1
40 TABLET ORAL NIGHTLY
Qty: 30 TABLET | Refills: 2 | Status: SHIPPED | OUTPATIENT
Start: 2021-07-13 | End: 2021-07-19 | Stop reason: SDUPTHER

## 2021-07-13 RX ORDER — ASPIRIN 81 MG/1
81 TABLET ORAL DAILY
Qty: 30 TABLET | Refills: 0 | Status: SHIPPED | OUTPATIENT
Start: 2021-07-13 | End: 2021-07-19 | Stop reason: SDUPTHER

## 2021-07-13 RX ADMIN — KETOROLAC TROMETHAMINE 15 MG: 30 INJECTION, SOLUTION INTRAMUSCULAR; INTRAVENOUS at 06:07

## 2021-07-14 ENCOUNTER — HOSPITAL ENCOUNTER (OUTPATIENT)
Dept: RADIOLOGY | Facility: HOSPITAL | Age: 51
Discharge: HOME | End: 2021-07-14
Attending: PODIATRIST
Payer: MEDICAID

## 2021-07-14 ENCOUNTER — OFFICE VISIT (OUTPATIENT)
Dept: PODIATRY | Facility: CLINIC | Age: 51
End: 2021-07-14
Payer: MEDICAID

## 2021-07-14 VITALS — HEIGHT: 64 IN | WEIGHT: 230 LBS | BODY MASS INDEX: 39.27 KG/M2

## 2021-07-14 DIAGNOSIS — E11.49 TYPE II DIABETES MELLITUS WITH NEUROLOGICAL MANIFESTATIONS: Primary | ICD-10-CM

## 2021-07-14 DIAGNOSIS — M79.672 PAIN IN LEFT FOOT: ICD-10-CM

## 2021-07-14 DIAGNOSIS — M79.89 SWELLING OF LEFT FOOT: ICD-10-CM

## 2021-07-14 DIAGNOSIS — M14.672 CHARCOT'S JOINT OF LEFT FOOT: ICD-10-CM

## 2021-07-14 PROCEDURE — 73630 X-RAY EXAM OF FOOT: CPT | Mod: TC,FY,PO,LT

## 2021-07-14 PROCEDURE — 99999 PR PBB SHADOW E&M-EST. PATIENT-LVL V: ICD-10-PCS | Mod: PBBFAC,,, | Performed by: PODIATRIST

## 2021-07-14 PROCEDURE — 99215 OFFICE O/P EST HI 40 MIN: CPT | Mod: PBBFAC,PO | Performed by: PODIATRIST

## 2021-07-14 PROCEDURE — 99214 OFFICE O/P EST MOD 30 MIN: CPT | Mod: S$PBB,,, | Performed by: PODIATRIST

## 2021-07-14 PROCEDURE — 99214 PR OFFICE/OUTPT VISIT, EST, LEVL IV, 30-39 MIN: ICD-10-PCS | Mod: S$PBB,,, | Performed by: PODIATRIST

## 2021-07-14 PROCEDURE — 73630 X-RAY EXAM OF FOOT: CPT | Mod: 26,LT,, | Performed by: RADIOLOGY

## 2021-07-14 PROCEDURE — 73630 XR FOOT COMPLETE 3 VIEW LEFT: ICD-10-PCS | Mod: 26,LT,, | Performed by: RADIOLOGY

## 2021-07-14 PROCEDURE — 99999 PR PBB SHADOW E&M-EST. PATIENT-LVL V: CPT | Mod: PBBFAC,,, | Performed by: PODIATRIST

## 2021-07-19 DIAGNOSIS — E11.42 TYPE 2 DIABETES MELLITUS WITH DIABETIC POLYNEUROPATHY, WITHOUT LONG-TERM CURRENT USE OF INSULIN: ICD-10-CM

## 2021-07-19 DIAGNOSIS — I10 ESSENTIAL HYPERTENSION: Chronic | ICD-10-CM

## 2021-07-19 DIAGNOSIS — J44.9 CHRONIC OBSTRUCTIVE PULMONARY DISEASE, UNSPECIFIED COPD TYPE: ICD-10-CM

## 2021-07-19 DIAGNOSIS — B96.89 ACUTE BACTERIAL BRONCHITIS: ICD-10-CM

## 2021-07-19 DIAGNOSIS — J20.8 ACUTE BACTERIAL BRONCHITIS: ICD-10-CM

## 2021-07-19 RX ORDER — FUROSEMIDE 20 MG/1
20 TABLET ORAL DAILY
Qty: 90 TABLET | Refills: 1 | Status: SHIPPED | OUTPATIENT
Start: 2021-07-19 | End: 2021-09-30 | Stop reason: SDUPTHER

## 2021-07-19 RX ORDER — FOLIC ACID 1 MG/1
1 TABLET ORAL DAILY
Qty: 30 TABLET | Refills: 2 | Status: SHIPPED | OUTPATIENT
Start: 2021-07-19 | End: 2022-05-04

## 2021-07-19 RX ORDER — PRAVASTATIN SODIUM 40 MG/1
40 TABLET ORAL NIGHTLY
Qty: 90 TABLET | Refills: 3 | Status: SHIPPED | OUTPATIENT
Start: 2021-07-19 | End: 2021-12-26

## 2021-07-19 RX ORDER — METFORMIN HYDROCHLORIDE 1000 MG/1
1000 TABLET ORAL 2 TIMES DAILY WITH MEALS
Qty: 180 TABLET | Refills: 2 | Status: SHIPPED | OUTPATIENT
Start: 2021-07-19 | End: 2021-12-26

## 2021-07-19 RX ORDER — QUETIAPINE FUMARATE 200 MG/1
200 TABLET, FILM COATED ORAL
Qty: 30 TABLET | Refills: 2 | Status: CANCELLED | OUTPATIENT
Start: 2021-07-19 | End: 2021-10-17

## 2021-07-19 RX ORDER — GABAPENTIN 300 MG/1
600 CAPSULE ORAL 2 TIMES DAILY
Qty: 120 CAPSULE | Refills: 2 | Status: SHIPPED | OUTPATIENT
Start: 2021-07-19 | End: 2021-12-23 | Stop reason: SDUPTHER

## 2021-07-19 RX ORDER — ALBUTEROL SULFATE 90 UG/1
AEROSOL, METERED RESPIRATORY (INHALATION)
Qty: 8.5 G | Refills: 1 | Status: SHIPPED | OUTPATIENT
Start: 2021-07-19 | End: 2021-11-16

## 2021-07-19 RX ORDER — FLUTICASONE PROPIONATE 50 MCG
1 SPRAY, SUSPENSION (ML) NASAL 2 TIMES DAILY
Qty: 16 G | Refills: 0 | OUTPATIENT
Start: 2021-07-19 | End: 2021-12-30

## 2021-07-19 RX ORDER — LORATADINE 10 MG/1
10 TABLET ORAL DAILY
Qty: 30 TABLET | Refills: 5 | Status: SHIPPED | OUTPATIENT
Start: 2021-07-19 | End: 2021-09-30 | Stop reason: SDUPTHER

## 2021-07-19 RX ORDER — IPRATROPIUM BROMIDE AND ALBUTEROL SULFATE 2.5; .5 MG/3ML; MG/3ML
3 SOLUTION RESPIRATORY (INHALATION) EVERY 6 HOURS PRN
Qty: 1 BOX | Refills: 0 | Status: SHIPPED | OUTPATIENT
Start: 2021-07-19 | End: 2022-06-28 | Stop reason: SDUPTHER

## 2021-07-19 RX ORDER — ASPIRIN 81 MG/1
81 TABLET ORAL DAILY
Qty: 30 TABLET | Refills: 0 | Status: SHIPPED | OUTPATIENT
Start: 2021-07-19 | End: 2022-02-03 | Stop reason: SDUPTHER

## 2021-07-19 RX ORDER — LISINOPRIL 5 MG/1
5 TABLET ORAL DAILY
Qty: 30 TABLET | Refills: 2 | Status: SHIPPED | OUTPATIENT
Start: 2021-07-19 | End: 2021-09-30

## 2021-07-28 ENCOUNTER — TELEPHONE (OUTPATIENT)
Dept: VASCULAR SURGERY | Facility: CLINIC | Age: 51
End: 2021-07-28

## 2021-07-28 DIAGNOSIS — B35.9 TINEA: ICD-10-CM

## 2021-07-28 RX ORDER — NYSTATIN 100000 [USP'U]/G
POWDER TOPICAL
Qty: 60 G | Refills: 2 | Status: SHIPPED | OUTPATIENT
Start: 2021-07-28 | End: 2021-09-30 | Stop reason: SDUPTHER

## 2021-07-30 ENCOUNTER — PROCEDURE VISIT (OUTPATIENT)
Dept: VASCULAR SURGERY | Facility: CLINIC | Age: 51
End: 2021-07-30
Payer: MEDICAID

## 2021-07-30 VITALS
WEIGHT: 230 LBS | TEMPERATURE: 97 F | RESPIRATION RATE: 16 BRPM | BODY MASS INDEX: 39.27 KG/M2 | HEART RATE: 90 BPM | DIASTOLIC BLOOD PRESSURE: 70 MMHG | SYSTOLIC BLOOD PRESSURE: 150 MMHG | HEIGHT: 64 IN

## 2021-07-30 DIAGNOSIS — I87.2 VENOUS INSUFFICIENCY: Primary | ICD-10-CM

## 2021-07-30 DIAGNOSIS — Z98.890 STATUS POST ABLATION OF INCOMPETENT VEIN USING LASER: Primary | ICD-10-CM

## 2021-07-30 DIAGNOSIS — Z91.89 AT HIGH RISK FOR PAIN FROM PROCEDURE: ICD-10-CM

## 2021-07-30 PROCEDURE — 36478 PR ENDOVENOUS LASER, 1ST VEIN: ICD-10-PCS | Mod: LT,S$GLB,, | Performed by: SURGERY

## 2021-07-30 PROCEDURE — 36478 ENDOVENOUS LASER 1ST VEIN: CPT | Mod: LT,S$GLB,, | Performed by: SURGERY

## 2021-07-30 RX ORDER — LIDOCAINE HYDROCHLORIDE 10 MG/ML
1 INJECTION INFILTRATION; PERINEURAL
Status: DISCONTINUED | OUTPATIENT
Start: 2021-07-30 | End: 2022-05-13 | Stop reason: HOSPADM

## 2021-08-04 ENCOUNTER — HOSPITAL ENCOUNTER (OUTPATIENT)
Dept: RADIOLOGY | Facility: HOSPITAL | Age: 51
Discharge: HOME | End: 2021-08-04
Attending: SURGERY
Payer: MEDICAID

## 2021-08-04 DIAGNOSIS — Z98.890 STATUS POST ABLATION OF INCOMPETENT VEIN USING LASER: ICD-10-CM

## 2021-08-04 PROCEDURE — 93971 US LOWER EXTREMITY VEINS LEFT: ICD-10-PCS | Mod: 26,LT,, | Performed by: RADIOLOGY

## 2021-08-04 PROCEDURE — 93971 EXTREMITY STUDY: CPT | Mod: 26,LT,, | Performed by: RADIOLOGY

## 2021-08-04 PROCEDURE — 93971 EXTREMITY STUDY: CPT | Mod: TC,LT

## 2021-08-05 ENCOUNTER — TELEPHONE (OUTPATIENT)
Dept: VASCULAR SURGERY | Facility: CLINIC | Age: 51
End: 2021-08-05

## 2021-08-09 DIAGNOSIS — M54.2 NECK PAIN ON LEFT SIDE: ICD-10-CM

## 2021-08-09 DIAGNOSIS — G89.29 CHRONIC BILATERAL LOW BACK PAIN WITH LEFT-SIDED SCIATICA: Primary | ICD-10-CM

## 2021-08-09 DIAGNOSIS — M54.42 CHRONIC BILATERAL LOW BACK PAIN WITH LEFT-SIDED SCIATICA: Primary | ICD-10-CM

## 2021-08-09 RX ORDER — METHOCARBAMOL 500 MG/1
500 TABLET, FILM COATED ORAL EVERY 6 HOURS PRN
Qty: 80 TABLET | Refills: 0 | Status: SHIPPED | OUTPATIENT
Start: 2021-08-09 | End: 2021-11-02 | Stop reason: SDUPTHER

## 2021-08-09 RX ORDER — METHOCARBAMOL 500 MG/1
500 TABLET, FILM COATED ORAL 4 TIMES DAILY PRN
Qty: 120 TABLET | Refills: 0 | Status: CANCELLED | OUTPATIENT
Start: 2021-08-09 | End: 2021-09-08

## 2021-08-09 RX ORDER — METHOCARBAMOL 500 MG/1
500 TABLET, FILM COATED ORAL 4 TIMES DAILY
COMMUNITY
End: 2021-08-09 | Stop reason: SDUPTHER

## 2021-08-12 ENCOUNTER — OFFICE VISIT (OUTPATIENT)
Dept: PODIATRY | Facility: CLINIC | Age: 51
End: 2021-08-12
Payer: MEDICAID

## 2021-08-12 VITALS — BODY MASS INDEX: 39.27 KG/M2 | HEIGHT: 64 IN | WEIGHT: 230 LBS

## 2021-08-12 DIAGNOSIS — E11.49 TYPE II DIABETES MELLITUS WITH NEUROLOGICAL MANIFESTATIONS: Primary | ICD-10-CM

## 2021-08-12 DIAGNOSIS — M20.5X2 HALLUX LIMITUS, ACQUIRED, LEFT: ICD-10-CM

## 2021-08-12 DIAGNOSIS — M14.672 CHARCOT'S JOINT OF LEFT FOOT: ICD-10-CM

## 2021-08-12 DIAGNOSIS — M20.41 HAMMER TOES OF BOTH FEET: ICD-10-CM

## 2021-08-12 DIAGNOSIS — M79.89 SWELLING OF LEFT FOOT: ICD-10-CM

## 2021-08-12 DIAGNOSIS — M79.672 PAIN IN LEFT FOOT: ICD-10-CM

## 2021-08-12 DIAGNOSIS — M20.42 HAMMER TOES OF BOTH FEET: ICD-10-CM

## 2021-08-12 DIAGNOSIS — M20.5X1 HALLUX LIMITUS, ACQUIRED, RIGHT: ICD-10-CM

## 2021-08-12 PROCEDURE — 99213 PR OFFICE/OUTPT VISIT, EST, LEVL III, 20-29 MIN: ICD-10-PCS | Mod: S$PBB,,, | Performed by: PODIATRIST

## 2021-08-12 PROCEDURE — 99999 PR PBB SHADOW E&M-EST. PATIENT-LVL IV: ICD-10-PCS | Mod: PBBFAC,,, | Performed by: PODIATRIST

## 2021-08-12 PROCEDURE — 99999 PR PBB SHADOW E&M-EST. PATIENT-LVL IV: CPT | Mod: PBBFAC,,, | Performed by: PODIATRIST

## 2021-08-12 PROCEDURE — 99213 OFFICE O/P EST LOW 20 MIN: CPT | Mod: S$PBB,,, | Performed by: PODIATRIST

## 2021-08-12 PROCEDURE — 99214 OFFICE O/P EST MOD 30 MIN: CPT | Mod: PBBFAC,PO | Performed by: PODIATRIST

## 2021-08-18 ENCOUNTER — TELEPHONE (OUTPATIENT)
Dept: UROLOGY | Facility: CLINIC | Age: 51
End: 2021-08-18

## 2021-08-18 DIAGNOSIS — I10 ESSENTIAL HYPERTENSION: Chronic | ICD-10-CM

## 2021-08-18 RX ORDER — METOPROLOL TARTRATE 50 MG/1
TABLET ORAL
Qty: 60 TABLET | Refills: 2 | Status: SHIPPED | OUTPATIENT
Start: 2021-08-18 | End: 2021-12-26

## 2021-08-23 ENCOUNTER — DOCUMENTATION ONLY (OUTPATIENT)
Dept: REHABILITATION | Facility: HOSPITAL | Age: 51
End: 2021-08-23

## 2021-08-23 ENCOUNTER — HOSPITAL ENCOUNTER (OUTPATIENT)
Dept: RADIOLOGY | Facility: HOSPITAL | Age: 51
Discharge: HOME | End: 2021-08-23
Attending: SURGERY
Payer: MEDICAID

## 2021-08-23 DIAGNOSIS — Z98.890 STATUS POST ABLATION OF INCOMPETENT VEIN USING LASER: ICD-10-CM

## 2021-08-23 PROCEDURE — 93970 US LOWER EXTREMITY VENOUS INSUFFICIENCY LEFT: ICD-10-PCS | Mod: 26,LT,, | Performed by: RADIOLOGY

## 2021-08-23 PROCEDURE — 93970 EXTREMITY STUDY: CPT | Mod: TC,LT

## 2021-08-23 PROCEDURE — 93971 EXTREMITY STUDY: CPT | Mod: TC,LT

## 2021-08-23 PROCEDURE — 93970 EXTREMITY STUDY: CPT | Mod: 26,LT,, | Performed by: RADIOLOGY

## 2021-08-25 ENCOUNTER — TELEPHONE (OUTPATIENT)
Dept: FAMILY MEDICINE | Facility: CLINIC | Age: 51
End: 2021-08-25

## 2021-09-09 ENCOUNTER — TELEPHONE (OUTPATIENT)
Dept: VASCULAR SURGERY | Facility: CLINIC | Age: 51
End: 2021-09-09

## 2021-09-13 ENCOUNTER — OFFICE VISIT (OUTPATIENT)
Dept: VASCULAR SURGERY | Facility: CLINIC | Age: 51
End: 2021-09-13
Payer: MEDICAID

## 2021-09-13 VITALS
DIASTOLIC BLOOD PRESSURE: 72 MMHG | HEIGHT: 64 IN | WEIGHT: 235.31 LBS | BODY MASS INDEX: 40.17 KG/M2 | SYSTOLIC BLOOD PRESSURE: 140 MMHG

## 2021-09-13 DIAGNOSIS — I87.2 VENOUS INSUFFICIENCY: ICD-10-CM

## 2021-09-13 DIAGNOSIS — I89.0 LYMPHEDEMA OF BOTH LOWER EXTREMITIES: Primary | ICD-10-CM

## 2021-09-13 DIAGNOSIS — Z98.890 STATUS POST ABLATION OF INCOMPETENT VEIN USING LASER: ICD-10-CM

## 2021-09-13 PROCEDURE — 99214 OFFICE O/P EST MOD 30 MIN: CPT | Mod: S$PBB,,, | Performed by: SURGERY

## 2021-09-13 PROCEDURE — 99214 PR OFFICE/OUTPT VISIT, EST, LEVL IV, 30-39 MIN: ICD-10-PCS | Mod: S$PBB,,, | Performed by: SURGERY

## 2021-09-13 PROCEDURE — 99215 OFFICE O/P EST HI 40 MIN: CPT | Mod: PBBFAC | Performed by: SURGERY

## 2021-09-13 PROCEDURE — 99999 PR PBB SHADOW E&M-EST. PATIENT-LVL V: CPT | Mod: PBBFAC,,, | Performed by: SURGERY

## 2021-09-13 PROCEDURE — 99999 PR PBB SHADOW E&M-EST. PATIENT-LVL V: ICD-10-PCS | Mod: PBBFAC,,, | Performed by: SURGERY

## 2021-09-22 ENCOUNTER — TELEPHONE (OUTPATIENT)
Dept: VASCULAR SURGERY | Facility: CLINIC | Age: 51
End: 2021-09-22

## 2021-09-23 DIAGNOSIS — E11.9 TYPE 2 DIABETES MELLITUS WITHOUT COMPLICATION: ICD-10-CM

## 2021-09-30 ENCOUNTER — LAB VISIT (OUTPATIENT)
Dept: LAB | Facility: HOSPITAL | Age: 51
End: 2021-09-30
Attending: INTERNAL MEDICINE
Payer: MEDICAID

## 2021-09-30 ENCOUNTER — OFFICE VISIT (OUTPATIENT)
Dept: FAMILY MEDICINE | Facility: CLINIC | Age: 51
End: 2021-09-30
Payer: MEDICAID

## 2021-09-30 VITALS
SYSTOLIC BLOOD PRESSURE: 156 MMHG | TEMPERATURE: 97 F | HEART RATE: 109 BPM | WEIGHT: 250.69 LBS | OXYGEN SATURATION: 97 % | BODY MASS INDEX: 42.8 KG/M2 | HEIGHT: 64 IN | DIASTOLIC BLOOD PRESSURE: 64 MMHG

## 2021-09-30 DIAGNOSIS — E11.40 CONTROLLED TYPE 2 DIABETES MELLITUS WITH NEUROPATHY: ICD-10-CM

## 2021-09-30 DIAGNOSIS — G89.29 CHRONIC BILATERAL LOW BACK PAIN WITH LEFT-SIDED SCIATICA: ICD-10-CM

## 2021-09-30 DIAGNOSIS — G47.33 OSA ON CPAP: ICD-10-CM

## 2021-09-30 DIAGNOSIS — E11.42 TYPE 2 DIABETES MELLITUS WITH DIABETIC POLYNEUROPATHY, WITHOUT LONG-TERM CURRENT USE OF INSULIN: ICD-10-CM

## 2021-09-30 DIAGNOSIS — I10 ESSENTIAL HYPERTENSION: ICD-10-CM

## 2021-09-30 DIAGNOSIS — B35.9 TINEA: ICD-10-CM

## 2021-09-30 DIAGNOSIS — Z12.4 SCREENING FOR CERVICAL CANCER: ICD-10-CM

## 2021-09-30 DIAGNOSIS — M54.42 CHRONIC BILATERAL LOW BACK PAIN WITH LEFT-SIDED SCIATICA: ICD-10-CM

## 2021-09-30 DIAGNOSIS — J44.9 CHRONIC OBSTRUCTIVE PULMONARY DISEASE, UNSPECIFIED COPD TYPE: Primary | ICD-10-CM

## 2021-09-30 LAB
ALBUMIN SERPL BCP-MCNC: 3.5 G/DL (ref 3.5–5.2)
ALP SERPL-CCNC: 75 U/L (ref 55–135)
ALT SERPL W/O P-5'-P-CCNC: 14 U/L (ref 10–44)
ANION GAP SERPL CALC-SCNC: 13 MMOL/L (ref 8–16)
ANISOCYTOSIS BLD QL SMEAR: SLIGHT
AST SERPL-CCNC: 14 U/L (ref 10–40)
BASOPHILS # BLD AUTO: 0.13 K/UL (ref 0–0.2)
BASOPHILS NFR BLD: 1 % (ref 0–1.9)
BILIRUB SERPL-MCNC: 0.2 MG/DL (ref 0.1–1)
BUN SERPL-MCNC: 12 MG/DL (ref 6–20)
CALCIUM SERPL-MCNC: 9.6 MG/DL (ref 8.7–10.5)
CHLORIDE SERPL-SCNC: 93 MMOL/L (ref 95–110)
CO2 SERPL-SCNC: 26 MMOL/L (ref 23–29)
CREAT SERPL-MCNC: 0.7 MG/DL (ref 0.5–1.4)
DIFFERENTIAL METHOD: ABNORMAL
EOSINOPHIL # BLD AUTO: 0.7 K/UL (ref 0–0.5)
EOSINOPHIL NFR BLD: 5.6 % (ref 0–8)
ERYTHROCYTE [DISTWIDTH] IN BLOOD BY AUTOMATED COUNT: 19.1 % (ref 11.5–14.5)
EST. GFR  (AFRICAN AMERICAN): >60 ML/MIN/1.73 M^2
EST. GFR  (NON AFRICAN AMERICAN): >60 ML/MIN/1.73 M^2
GLUCOSE SERPL-MCNC: 155 MG/DL (ref 70–110)
HCT VFR BLD AUTO: 34.5 % (ref 37–48.5)
HGB BLD-MCNC: 11.3 G/DL (ref 12–16)
HYPOCHROMIA BLD QL SMEAR: ABNORMAL
IMM GRANULOCYTES # BLD AUTO: 0.12 K/UL (ref 0–0.04)
IMM GRANULOCYTES NFR BLD AUTO: 0.9 % (ref 0–0.5)
LYMPHOCYTES # BLD AUTO: 6.6 K/UL (ref 1–4.8)
LYMPHOCYTES NFR BLD: 49.9 % (ref 18–48)
MCH RBC QN AUTO: 26.5 PG (ref 27–31)
MCHC RBC AUTO-ENTMCNC: 32.8 G/DL (ref 32–36)
MCV RBC AUTO: 81 FL (ref 82–98)
MONOCYTES # BLD AUTO: 1.5 K/UL (ref 0.3–1)
MONOCYTES NFR BLD: 10.9 % (ref 4–15)
NEUTROPHILS # BLD AUTO: 4.2 K/UL (ref 1.8–7.7)
NEUTROPHILS NFR BLD: 31.7 % (ref 38–73)
NRBC BLD-RTO: 0 /100 WBC
PLATELET # BLD AUTO: 443 K/UL (ref 150–450)
PMV BLD AUTO: 10.3 FL (ref 9.2–12.9)
POIKILOCYTOSIS BLD QL SMEAR: SLIGHT
POTASSIUM SERPL-SCNC: 4.8 MMOL/L (ref 3.5–5.1)
PROT SERPL-MCNC: 6.6 G/DL (ref 6–8.4)
RBC # BLD AUTO: 4.26 M/UL (ref 4–5.4)
SODIUM SERPL-SCNC: 132 MMOL/L (ref 136–145)
WBC # BLD AUTO: 13.29 K/UL (ref 3.9–12.7)

## 2021-09-30 PROCEDURE — 99999 PR PBB SHADOW E&M-EST. PATIENT-LVL V: ICD-10-PCS | Mod: PBBFAC,,, | Performed by: INTERNAL MEDICINE

## 2021-09-30 PROCEDURE — 99214 OFFICE O/P EST MOD 30 MIN: CPT | Mod: S$PBB,,, | Performed by: INTERNAL MEDICINE

## 2021-09-30 PROCEDURE — 99999 PR PBB SHADOW E&M-EST. PATIENT-LVL V: CPT | Mod: PBBFAC,,, | Performed by: INTERNAL MEDICINE

## 2021-09-30 PROCEDURE — 85025 COMPLETE CBC W/AUTO DIFF WBC: CPT | Performed by: INTERNAL MEDICINE

## 2021-09-30 PROCEDURE — 99214 PR OFFICE/OUTPT VISIT, EST, LEVL IV, 30-39 MIN: ICD-10-PCS | Mod: S$PBB,,, | Performed by: INTERNAL MEDICINE

## 2021-09-30 PROCEDURE — 80053 COMPREHEN METABOLIC PANEL: CPT | Performed by: INTERNAL MEDICINE

## 2021-09-30 PROCEDURE — 83036 HEMOGLOBIN GLYCOSYLATED A1C: CPT | Performed by: INTERNAL MEDICINE

## 2021-09-30 PROCEDURE — 99215 OFFICE O/P EST HI 40 MIN: CPT | Mod: PBBFAC,PN | Performed by: INTERNAL MEDICINE

## 2021-09-30 PROCEDURE — 36415 COLL VENOUS BLD VENIPUNCTURE: CPT | Mod: PN | Performed by: INTERNAL MEDICINE

## 2021-09-30 RX ORDER — LANCETS
EACH MISCELLANEOUS
Qty: 100 EACH | Refills: 1 | Status: ON HOLD | OUTPATIENT
Start: 2021-09-30 | End: 2022-05-13 | Stop reason: HOSPADM

## 2021-09-30 RX ORDER — LORATADINE 10 MG/1
10 TABLET ORAL DAILY
Qty: 30 TABLET | Refills: 5 | OUTPATIENT
Start: 2021-09-30 | End: 2021-12-30

## 2021-09-30 RX ORDER — IBUPROFEN 600 MG/1
600 TABLET ORAL EVERY 8 HOURS PRN
Qty: 90 TABLET | Refills: 2 | Status: SHIPPED | OUTPATIENT
Start: 2021-09-30 | End: 2022-01-11

## 2021-09-30 RX ORDER — LISINOPRIL 10 MG/1
5 TABLET ORAL DAILY
Qty: 30 TABLET | Refills: 2 | Status: SHIPPED | OUTPATIENT
Start: 2021-09-30 | End: 2021-09-30

## 2021-09-30 RX ORDER — INSULIN PUMP SYRINGE, 3 ML
EACH MISCELLANEOUS
Qty: 1 EACH | Refills: 0 | Status: ON HOLD | OUTPATIENT
Start: 2021-09-30 | End: 2022-05-13 | Stop reason: HOSPADM

## 2021-09-30 RX ORDER — LISINOPRIL 10 MG/1
10 TABLET ORAL DAILY
Qty: 30 TABLET | Refills: 2 | Status: SHIPPED | OUTPATIENT
Start: 2021-09-30 | End: 2022-04-04

## 2021-09-30 RX ORDER — NYSTATIN 100000 [USP'U]/G
POWDER TOPICAL
Qty: 60 G | Refills: 2 | Status: ON HOLD | OUTPATIENT
Start: 2021-09-30 | End: 2022-04-18 | Stop reason: HOSPADM

## 2021-10-01 LAB
ESTIMATED AVG GLUCOSE: 154 MG/DL (ref 68–131)
HBA1C MFR BLD: 7 % (ref 4–5.6)

## 2021-10-04 ENCOUNTER — PATIENT MESSAGE (OUTPATIENT)
Dept: ADMINISTRATIVE | Facility: HOSPITAL | Age: 51
End: 2021-10-04

## 2021-10-07 ENCOUNTER — OFFICE VISIT (OUTPATIENT)
Dept: PODIATRY | Facility: CLINIC | Age: 51
End: 2021-10-07
Payer: MEDICAID

## 2021-10-07 ENCOUNTER — HOSPITAL ENCOUNTER (OUTPATIENT)
Dept: RADIOLOGY | Facility: HOSPITAL | Age: 51
Discharge: HOME | End: 2021-10-07
Attending: PODIATRIST
Payer: MEDICAID

## 2021-10-07 VITALS — WEIGHT: 244.06 LBS | HEIGHT: 64 IN | BODY MASS INDEX: 41.67 KG/M2

## 2021-10-07 DIAGNOSIS — M14.672 CHARCOT'S JOINT OF LEFT FOOT: ICD-10-CM

## 2021-10-07 DIAGNOSIS — E11.49 TYPE II DIABETES MELLITUS WITH NEUROLOGICAL MANIFESTATIONS: Primary | ICD-10-CM

## 2021-10-07 PROCEDURE — 99214 OFFICE O/P EST MOD 30 MIN: CPT | Mod: S$PBB,,, | Performed by: PODIATRIST

## 2021-10-07 PROCEDURE — 99999 PR PBB SHADOW E&M-EST. PATIENT-LVL V: ICD-10-PCS | Mod: PBBFAC,,, | Performed by: PODIATRIST

## 2021-10-07 PROCEDURE — 99215 OFFICE O/P EST HI 40 MIN: CPT | Mod: PBBFAC,PO | Performed by: PODIATRIST

## 2021-10-07 PROCEDURE — 99214 PR OFFICE/OUTPT VISIT, EST, LEVL IV, 30-39 MIN: ICD-10-PCS | Mod: S$PBB,,, | Performed by: PODIATRIST

## 2021-10-07 PROCEDURE — 73630 XR FOOT COMPLETE 3 VIEW LEFT: ICD-10-PCS | Mod: 26,LT,, | Performed by: RADIOLOGY

## 2021-10-07 PROCEDURE — 73630 X-RAY EXAM OF FOOT: CPT | Mod: TC,FY,PO,LT

## 2021-10-07 PROCEDURE — 99999 PR PBB SHADOW E&M-EST. PATIENT-LVL V: CPT | Mod: PBBFAC,,, | Performed by: PODIATRIST

## 2021-10-07 PROCEDURE — 73630 X-RAY EXAM OF FOOT: CPT | Mod: 26,LT,, | Performed by: RADIOLOGY

## 2021-10-15 DIAGNOSIS — G47.33 OSA (OBSTRUCTIVE SLEEP APNEA): Primary | ICD-10-CM

## 2021-10-19 ENCOUNTER — PATIENT OUTREACH (OUTPATIENT)
Dept: ADMINISTRATIVE | Facility: OTHER | Age: 51
End: 2021-10-19

## 2021-10-21 ENCOUNTER — OFFICE VISIT (OUTPATIENT)
Dept: PODIATRY | Facility: CLINIC | Age: 51
End: 2021-10-21
Payer: MEDICAID

## 2021-10-21 VITALS — WEIGHT: 244.06 LBS | BODY MASS INDEX: 41.67 KG/M2 | HEIGHT: 64 IN

## 2021-10-21 DIAGNOSIS — M79.672 PAIN IN LEFT FOOT: ICD-10-CM

## 2021-10-21 DIAGNOSIS — M79.89 SWELLING OF LEFT FOOT: ICD-10-CM

## 2021-10-21 DIAGNOSIS — M14.672 CHARCOT'S JOINT OF LEFT FOOT: Primary | ICD-10-CM

## 2021-10-21 DIAGNOSIS — M20.5X2 HALLUX LIMITUS, ACQUIRED, LEFT: ICD-10-CM

## 2021-10-21 DIAGNOSIS — E11.49 TYPE II DIABETES MELLITUS WITH NEUROLOGICAL MANIFESTATIONS: ICD-10-CM

## 2021-10-21 DIAGNOSIS — M20.5X1 HALLUX LIMITUS, ACQUIRED, RIGHT: ICD-10-CM

## 2021-10-21 PROCEDURE — 99214 PR OFFICE/OUTPT VISIT, EST, LEVL IV, 30-39 MIN: ICD-10-PCS | Mod: S$PBB,,, | Performed by: PODIATRIST

## 2021-10-21 PROCEDURE — 99214 OFFICE O/P EST MOD 30 MIN: CPT | Mod: S$PBB,,, | Performed by: PODIATRIST

## 2021-10-21 PROCEDURE — 99999 PR PBB SHADOW E&M-EST. PATIENT-LVL V: CPT | Mod: PBBFAC,,, | Performed by: PODIATRIST

## 2021-10-21 PROCEDURE — 99215 OFFICE O/P EST HI 40 MIN: CPT | Mod: PBBFAC,PO | Performed by: PODIATRIST

## 2021-10-21 PROCEDURE — 99999 PR PBB SHADOW E&M-EST. PATIENT-LVL V: ICD-10-PCS | Mod: PBBFAC,,, | Performed by: PODIATRIST

## 2021-10-21 RX ORDER — TRAMADOL HYDROCHLORIDE 50 MG/1
50 TABLET ORAL EVERY 6 HOURS PRN
Qty: 30 TABLET | Refills: 0 | Status: SHIPPED | OUTPATIENT
Start: 2021-10-21 | End: 2021-10-31

## 2021-10-26 ENCOUNTER — HOSPITAL ENCOUNTER (OUTPATIENT)
Dept: RADIOLOGY | Facility: HOSPITAL | Age: 51
Discharge: HOME | End: 2021-10-26
Attending: INTERNAL MEDICINE
Payer: MEDICAID

## 2021-10-26 DIAGNOSIS — Z12.31 OTHER SCREENING MAMMOGRAM: ICD-10-CM

## 2021-10-26 PROCEDURE — 77067 MAMMO DIGITAL SCREENING BILAT WITH TOMO: ICD-10-PCS | Mod: 26,,, | Performed by: RADIOLOGY

## 2021-10-26 PROCEDURE — 77063 MAMMO DIGITAL SCREENING BILAT WITH TOMO: ICD-10-PCS | Mod: 26,,, | Performed by: RADIOLOGY

## 2021-10-26 PROCEDURE — 77067 SCR MAMMO BI INCL CAD: CPT | Mod: TC

## 2021-10-26 PROCEDURE — 77063 BREAST TOMOSYNTHESIS BI: CPT | Mod: 26,,, | Performed by: RADIOLOGY

## 2021-10-26 PROCEDURE — 77067 SCR MAMMO BI INCL CAD: CPT | Mod: 26,,, | Performed by: RADIOLOGY

## 2021-11-02 DIAGNOSIS — G89.29 CHRONIC BILATERAL LOW BACK PAIN WITH LEFT-SIDED SCIATICA: ICD-10-CM

## 2021-11-02 DIAGNOSIS — M54.42 CHRONIC BILATERAL LOW BACK PAIN WITH LEFT-SIDED SCIATICA: ICD-10-CM

## 2021-11-02 RX ORDER — METHOCARBAMOL 500 MG/1
500 TABLET, FILM COATED ORAL EVERY 6 HOURS PRN
Qty: 80 TABLET | Refills: 0 | Status: SHIPPED | OUTPATIENT
Start: 2021-11-02 | End: 2022-02-03

## 2021-11-08 ENCOUNTER — OFFICE VISIT (OUTPATIENT)
Dept: PODIATRY | Facility: CLINIC | Age: 51
End: 2021-11-08
Payer: MEDICAID

## 2021-11-08 VITALS
DIASTOLIC BLOOD PRESSURE: 75 MMHG | HEIGHT: 64 IN | OXYGEN SATURATION: 98 % | HEART RATE: 83 BPM | BODY MASS INDEX: 42.51 KG/M2 | RESPIRATION RATE: 18 BRPM | WEIGHT: 249 LBS | SYSTOLIC BLOOD PRESSURE: 148 MMHG

## 2021-11-08 DIAGNOSIS — M14.672 CHARCOT'S JOINT OF LEFT FOOT: Primary | ICD-10-CM

## 2021-11-08 PROCEDURE — 99214 OFFICE O/P EST MOD 30 MIN: CPT | Mod: S$PBB,,, | Performed by: PODIATRIST

## 2021-11-08 PROCEDURE — 99999 PR PBB SHADOW E&M-EST. PATIENT-LVL III: CPT | Mod: PBBFAC,,, | Performed by: PODIATRIST

## 2021-11-08 PROCEDURE — 99999 PR PBB SHADOW E&M-EST. PATIENT-LVL III: ICD-10-PCS | Mod: PBBFAC,,, | Performed by: PODIATRIST

## 2021-11-08 PROCEDURE — 99213 OFFICE O/P EST LOW 20 MIN: CPT | Mod: PBBFAC,PN | Performed by: PODIATRIST

## 2021-11-08 PROCEDURE — 99214 PR OFFICE/OUTPT VISIT, EST, LEVL IV, 30-39 MIN: ICD-10-PCS | Mod: S$PBB,,, | Performed by: PODIATRIST

## 2021-11-10 ENCOUNTER — TELEPHONE (OUTPATIENT)
Dept: REHABILITATION | Facility: HOSPITAL | Age: 51
End: 2021-11-10
Payer: MEDICAID

## 2021-11-16 DIAGNOSIS — B96.89 ACUTE BACTERIAL BRONCHITIS: ICD-10-CM

## 2021-11-16 DIAGNOSIS — J44.9 CHRONIC OBSTRUCTIVE PULMONARY DISEASE, UNSPECIFIED COPD TYPE: ICD-10-CM

## 2021-11-16 DIAGNOSIS — J20.8 ACUTE BACTERIAL BRONCHITIS: ICD-10-CM

## 2021-11-16 RX ORDER — FLUTICASONE PROPIONATE AND SALMETEROL 250; 50 UG/1; UG/1
1 POWDER RESPIRATORY (INHALATION) 2 TIMES DAILY
Qty: 60 EACH | Refills: 2 | Status: SHIPPED | OUTPATIENT
Start: 2021-11-16 | End: 2022-06-28 | Stop reason: SDUPTHER

## 2021-11-16 RX ORDER — ALBUTEROL SULFATE 90 UG/1
AEROSOL, METERED RESPIRATORY (INHALATION)
Qty: 8.5 G | Refills: 1 | OUTPATIENT
Start: 2021-11-16 | End: 2021-12-30

## 2021-11-16 RX ORDER — MOMETASONE FUROATE AND FORMOTEROL FUMARATE DIHYDRATE 100; 5 UG/1; UG/1
AEROSOL RESPIRATORY (INHALATION)
Qty: 13 G | Refills: 2 | OUTPATIENT
Start: 2021-11-16

## 2021-12-06 ENCOUNTER — TELEPHONE (OUTPATIENT)
Dept: SMOKING CESSATION | Facility: CLINIC | Age: 51
End: 2021-12-06
Payer: MEDICAID

## 2021-12-06 ENCOUNTER — CLINICAL SUPPORT (OUTPATIENT)
Dept: SMOKING CESSATION | Facility: CLINIC | Age: 51
End: 2021-12-06
Payer: COMMERCIAL

## 2021-12-06 DIAGNOSIS — F17.200 NICOTINE DEPENDENCE: Primary | ICD-10-CM

## 2021-12-06 PROCEDURE — 99407 PR TOBACCO USE CESSATION INTENSIVE >10 MINUTES: ICD-10-PCS | Mod: S$GLB,,,

## 2021-12-06 PROCEDURE — 99407 BEHAV CHNG SMOKING > 10 MIN: CPT | Mod: S$GLB,,,

## 2021-12-13 ENCOUNTER — TELEPHONE (OUTPATIENT)
Dept: SMOKING CESSATION | Facility: CLINIC | Age: 51
End: 2021-12-13
Payer: MEDICAID

## 2021-12-14 ENCOUNTER — TELEPHONE (OUTPATIENT)
Dept: SMOKING CESSATION | Facility: CLINIC | Age: 51
End: 2021-12-14
Payer: MEDICAID

## 2021-12-15 ENCOUNTER — TELEPHONE (OUTPATIENT)
Dept: SMOKING CESSATION | Facility: CLINIC | Age: 51
End: 2021-12-15
Payer: MEDICAID

## 2021-12-15 ENCOUNTER — PATIENT MESSAGE (OUTPATIENT)
Dept: ADMINISTRATIVE | Facility: HOSPITAL | Age: 51
End: 2021-12-15
Payer: MEDICAID

## 2021-12-23 RX ORDER — GABAPENTIN 300 MG/1
600 CAPSULE ORAL 2 TIMES DAILY
Qty: 120 CAPSULE | Refills: 2 | Status: SHIPPED | OUTPATIENT
Start: 2021-12-23 | End: 2022-04-01

## 2021-12-27 ENCOUNTER — TELEPHONE (OUTPATIENT)
Dept: SMOKING CESSATION | Facility: CLINIC | Age: 51
End: 2021-12-27
Payer: MEDICAID

## 2021-12-30 ENCOUNTER — HOSPITAL ENCOUNTER (EMERGENCY)
Facility: HOSPITAL | Age: 51
Discharge: HOME | End: 2021-12-30
Attending: EMERGENCY MEDICINE
Payer: MEDICAID

## 2021-12-30 VITALS
HEIGHT: 64 IN | HEART RATE: 106 BPM | WEIGHT: 248 LBS | TEMPERATURE: 98 F | BODY MASS INDEX: 42.34 KG/M2 | RESPIRATION RATE: 29 BRPM | DIASTOLIC BLOOD PRESSURE: 83 MMHG | SYSTOLIC BLOOD PRESSURE: 168 MMHG | OXYGEN SATURATION: 93 %

## 2021-12-30 DIAGNOSIS — J45.901 EXACERBATION OF ASTHMA, UNSPECIFIED ASTHMA SEVERITY, UNSPECIFIED WHETHER PERSISTENT: Primary | ICD-10-CM

## 2021-12-30 LAB
ALBUMIN SERPL-MCNC: 3.5 G/DL (ref 3.3–5.5)
ALP SERPL-CCNC: 79 U/L (ref 42–141)
BILIRUB SERPL-MCNC: 0.4 MG/DL (ref 0.2–1.6)
BILIRUBIN, POC UA: NEGATIVE
BLOOD, POC UA: NEGATIVE
BUN SERPL-MCNC: 9 MG/DL (ref 7–22)
CALCIUM SERPL-MCNC: 9.6 MG/DL (ref 8–10.3)
CHLORIDE SERPL-SCNC: 97 MMOL/L (ref 98–108)
CLARITY, POC UA: CLEAR
COLOR, POC UA: ABNORMAL
CREAT SERPL-MCNC: 0.3 MG/DL (ref 0.6–1.2)
CTP QC/QA: YES
GLUCOSE SERPL-MCNC: 108 MG/DL (ref 73–118)
GLUCOSE, POC UA: NEGATIVE
INFLUENZA A ANTIGEN, POC: NEGATIVE
INFLUENZA B ANTIGEN, POC: NEGATIVE
KETONES, POC UA: ABNORMAL
LEUKOCYTE EST, POC UA: NEGATIVE
NITRITE, POC UA: NEGATIVE
PH UR STRIP: 6 [PH]
POC ALT (SGPT): 20 U/L (ref 10–47)
POC AST (SGOT): 23 U/L (ref 11–38)
POC B-TYPE NATRIURETIC PEPTIDE: 25.7 PG/ML (ref 0–100)
POC CARDIAC TROPONIN I: 0.01 NG/ML
POC RAPID STREP A: NEGATIVE
POC TCO2: 28 MMOL/L (ref 18–33)
POCT GLUCOSE: 134 MG/DL (ref 70–110)
POTASSIUM BLD-SCNC: 4.4 MMOL/L (ref 3.6–5.1)
PROTEIN, POC UA: ABNORMAL
PROTEIN, POC: 6.5 G/DL (ref 6.4–8.1)
SAMPLE: NORMAL
SARS-COV-2 RDRP RESP QL NAA+PROBE: NEGATIVE
SODIUM BLD-SCNC: 141 MMOL/L (ref 128–145)
SPECIFIC GRAVITY, POC UA: >=1.03
UROBILINOGEN, POC UA: 0.2 E.U./DL

## 2021-12-30 PROCEDURE — 80053 COMPREHEN METABOLIC PANEL: CPT | Mod: ER

## 2021-12-30 PROCEDURE — 81003 URINALYSIS AUTO W/O SCOPE: CPT | Mod: ER

## 2021-12-30 PROCEDURE — U0003 INFECTIOUS AGENT DETECTION BY NUCLEIC ACID (DNA OR RNA); SEVERE ACUTE RESPIRATORY SYNDROME CORONAVIRUS 2 (SARS-COV-2) (CORONAVIRUS DISEASE [COVID-19]), AMPLIFIED PROBE TECHNIQUE, MAKING USE OF HIGH THROUGHPUT TECHNOLOGIES AS DESCRIBED BY CMS-2020-01-R: HCPCS | Performed by: EMERGENCY MEDICINE

## 2021-12-30 PROCEDURE — 82962 GLUCOSE BLOOD TEST: CPT | Mod: ER

## 2021-12-30 PROCEDURE — 87502 INFLUENZA DNA AMP PROBE: CPT | Mod: ER

## 2021-12-30 PROCEDURE — 63600175 PHARM REV CODE 636 W HCPCS: Mod: ER | Performed by: EMERGENCY MEDICINE

## 2021-12-30 PROCEDURE — 99284 EMERGENCY DEPT VISIT MOD MDM: CPT | Mod: 25,ER

## 2021-12-30 PROCEDURE — 83880 ASSAY OF NATRIURETIC PEPTIDE: CPT | Mod: ER

## 2021-12-30 PROCEDURE — 84484 ASSAY OF TROPONIN QUANT: CPT | Mod: ER

## 2021-12-30 PROCEDURE — 85025 COMPLETE CBC W/AUTO DIFF WBC: CPT | Mod: ER

## 2021-12-30 PROCEDURE — 25000242 PHARM REV CODE 250 ALT 637 W/ HCPCS: Mod: ER | Performed by: EMERGENCY MEDICINE

## 2021-12-30 PROCEDURE — U0002 COVID-19 LAB TEST NON-CDC: HCPCS | Mod: ER | Performed by: EMERGENCY MEDICINE

## 2021-12-30 PROCEDURE — 96374 THER/PROPH/DIAG INJ IV PUSH: CPT | Mod: ER

## 2021-12-30 RX ORDER — LORATADINE 10 MG/1
10 TABLET ORAL DAILY
Qty: 60 TABLET | Refills: 0 | Status: SHIPPED | OUTPATIENT
Start: 2021-12-30 | End: 2022-07-19 | Stop reason: SDUPTHER

## 2021-12-30 RX ORDER — FLUTICASONE PROPIONATE 50 MCG
1 SPRAY, SUSPENSION (ML) NASAL 2 TIMES DAILY
Qty: 16 G | Refills: 0 | Status: SHIPPED | OUTPATIENT
Start: 2021-12-30 | End: 2022-07-21

## 2021-12-30 RX ORDER — FAMOTIDINE 20 MG/1
20 TABLET, FILM COATED ORAL 2 TIMES DAILY
Qty: 20 TABLET | Refills: 0 | Status: ON HOLD | OUTPATIENT
Start: 2021-12-30 | End: 2022-04-13

## 2021-12-30 RX ORDER — ALBUTEROL SULFATE 2.5 MG/.5ML
10 SOLUTION RESPIRATORY (INHALATION)
Status: COMPLETED | OUTPATIENT
Start: 2021-12-30 | End: 2021-12-30

## 2021-12-30 RX ORDER — ALBUTEROL SULFATE 2.5 MG/.5ML
2.5 SOLUTION RESPIRATORY (INHALATION) EVERY 4 HOURS PRN
Qty: 30 EACH | Refills: 0 | Status: ON HOLD | OUTPATIENT
Start: 2021-12-30 | End: 2022-04-18 | Stop reason: HOSPADM

## 2021-12-30 RX ORDER — ALBUTEROL SULFATE 90 UG/1
2 AEROSOL, METERED RESPIRATORY (INHALATION) EVERY 6 HOURS PRN
Qty: 18 G | Refills: 0 | Status: SHIPPED | OUTPATIENT
Start: 2021-12-30 | End: 2022-05-13

## 2021-12-30 RX ORDER — IPRATROPIUM BROMIDE 0.5 MG/2.5ML
500 SOLUTION RESPIRATORY (INHALATION)
Status: COMPLETED | OUTPATIENT
Start: 2021-12-30 | End: 2021-12-30

## 2021-12-30 RX ORDER — PREDNISONE 20 MG/1
40 TABLET ORAL DAILY
Qty: 10 TABLET | Refills: 0 | Status: SHIPPED | OUTPATIENT
Start: 2021-12-30 | End: 2022-01-04

## 2021-12-30 RX ORDER — METHYLPREDNISOLONE SOD SUCC 125 MG
125 VIAL (EA) INJECTION
Status: COMPLETED | OUTPATIENT
Start: 2021-12-30 | End: 2021-12-30

## 2021-12-30 RX ADMIN — IPRATROPIUM BROMIDE 500 MCG: 0.5 SOLUTION RESPIRATORY (INHALATION) at 11:12

## 2021-12-30 RX ADMIN — ALBUTEROL SULFATE 10 MG: 2.5 SOLUTION RESPIRATORY (INHALATION) at 11:12

## 2021-12-30 RX ADMIN — METHYLPREDNISOLONE SODIUM SUCCINATE 125 MG: 125 INJECTION, POWDER, FOR SOLUTION INTRAMUSCULAR; INTRAVENOUS at 11:12

## 2021-12-30 NOTE — ED PROVIDER NOTES
Encounter Date: 12/30/2021    SCRIBE #1 NOTE: I, Ariana Kyle, am scribing for, and in the presence of,  Lary Sweet DO. I have scribed the following portions of the note - Other sections scribed: HPI, ROS, PE.       History     Chief Complaint   Patient presents with    Cough     Pt reports cough, fatigue, weakness, SOB x1 week      51 y.o. female with PMHx of COPD, CAD, Asthma, DM, PE and others who presents to the ED for chief complaint of a persistent cough beginning 1 week ago. Pt endorses associated wheezing, shortness of breath, congestion, cough, sore throat, fatigue, and headache. Denies chest pain, fever, or any other complaints. She has not received the COVID-19 vaccination. Pt is allergic to Morphine, Penicillins, and Januvia. No modifying factors noted. Pt is a smoker.       The history is provided by the patient. No  was used.     Review of patient's allergies indicates:   Allergen Reactions    Morphine Other (See Comments)     Patient had a psychotic episode after taking Morphine  Agitation, hallucinations    Penicillins Anaphylaxis     itching    Januvia [sitagliptin] Hives     Past Medical History:   Diagnosis Date    ADHD (attention deficit hyperactivity disorder)     Arthritis     Asthma     Bipolar 1 disorder     Cataract     COPD (chronic obstructive pulmonary disease)     Coronary artery disease     A fib    Depression     bipolar manic depresson    Diabetes mellitus     DVT of lower extremity, bilateral July 2013    bilateral LE DVT. [place] filter placed.     Encounter for blood transfusion     History of blood clots 1. Left Leg=2003; 2.Bilateral Groin=Blood Clots= 5 or 6/ 2013 & 7/2013; 3. LLL of Lung=7/2013;  4. Lt. Lower Leg=7/2013.     Pt. had 1st Blood Clot after Dchezjzxersw=2371, & Last=2013. [place] Filter= Rt.Lateral Neck.    HTN (hypertension) 6/6/2013    Pt states that she does not have hypertension    Hypercholesteremia     Irregular  "heartbeat     Neuromuscular disorder     neuropathy feet    PE (pulmonary embolism) 2013     bilat LE DVT.     Restless leg syndrome      Past Surgical History:   Procedure Laterality Date    ABDOMINAL SURGERY  2010    gastric sleeve    BILATERAL OOPHORECTOMY Bilateral 2015    CHOLECYSTECTOMY      Green' s filter Right 2012    Right Neck & Tunneled Down.    HERNIA REPAIR      "[place] [facility] Hernias Repaires around th [place].", pt. states    LAPAROSCOPIC CHOLECYSTECTOMY N/A 9/10/2020    Procedure: CHOLECYSTECTOMY, LAPAROSCOPIC;  Surgeon: Montrell Gutierrez MD;  Location[facility];  Service: General;  Laterality: N/A;  RN PREOP ----COVID Negative      OVARIAN CYST REMOVAL  3/13/2014    VA REMOVAL OF OVARY/TUBE(S)      SPLENECTOMY, TOTAL  2003    TONSILLECTOMY      as a child    TYMPANOSTOMY TUBE PLACEMENT  1976    VEIN SURGERY      Lt leg     Family History   Problem Relation Age of Onset    Hypertension Father     Diabetes Father     Heart disease Father     Cataracts Father     Diabetes Paternal Grandfather     Heart disease Paternal Grandfather     No Known Problems Mother     Ovarian cancer Maternal Grandmother          from this. ? age     No Known Problems Sister     No Known Problems Brother     No Known Problems Maternal Aunt     No Known Problems Maternal Uncle     No Known Problems Paternal Aunt     No Known Problems Paternal Uncle     No Known Problems Maternal Grandfather     Ovarian cancer Paternal Grandmother     Uterine cancer Neg Hx     Breast cancer Neg Hx     Colon cancer Neg Hx     Amblyopia Neg Hx     Blindness Neg Hx     Cancer Neg Hx     Glaucoma Neg Hx     Macular degeneration Neg Hx     Retinal detachment Neg Hx     Strabismus Neg Hx     Stroke Neg Hx     Thyroid disease Neg Hx      Social History     Tobacco Use    Smoking status: Current Every Day Smoker     Packs/day: 0.50     Years: 25.00     Pack years: 12.50     " Types: Cigarettes     Last attempt to quit: 2020     Years since quittin.0    Smokeless tobacco: Never Used    Tobacco comment: still smoking 6 cigarettes each day   Substance Use Topics    Alcohol use: No     Alcohol/week: 0.0 standard drinks    Drug use: No     Review of Systems   Constitutional: Positive for fatigue. Negative for fever.   HENT: Positive for congestion and sore throat. Negative for rhinorrhea.    Eyes: Negative for redness.   Respiratory: Positive for cough, shortness of breath and wheezing.    Cardiovascular: Negative for chest pain and leg swelling.   Gastrointestinal: Negative for abdominal pain, diarrhea, nausea and vomiting.   Genitourinary: Negative for dysuria.   Musculoskeletal: Negative for back pain.   Skin: Negative for rash.   Neurological: Positive for headaches. Negative for syncope and weakness.   Hematological: Does not bruise/bleed easily.   All other systems reviewed and are negative.      Physical Exam     Initial Vitals [21 1009]   BP Pulse Resp Temp SpO2   (!) 194/78 98 19 98.1 °F (36.7 °C) (!) 93 %      MAP       --         Physical Exam    Nursing note and vitals reviewed.  Constitutional: She appears well-developed and well-nourished.   HENT:   Head: Normocephalic and atraumatic.   Right Ear: External ear normal.   Left Ear: External ear normal.   Nose: Nose normal.   Mouth/Throat: Oropharynx is clear and moist.   Eyes: Conjunctivae and EOM are normal. Pupils are equal, round, and reactive to light.   Neck: Phonation normal. Neck supple.   Normal range of motion.  Cardiovascular: Normal rate, regular rhythm, normal heart sounds and intact distal pulses. Exam reveals no gallop and no friction rub.    No murmur heard.  Pulmonary/Chest: Effort normal. No stridor. Tachypnea noted. No respiratory distress. She has decreased breath sounds. She has wheezes. She has no rhonchi. She has no rales. She exhibits no tenderness.   Abdominal: Abdomen is soft. Bowel  sounds are normal. She exhibits no distension. There is no abdominal tenderness. There is no rigidity, no rebound and no guarding.   Musculoskeletal:         General: No tenderness or edema. Normal range of motion.      Cervical back: Normal range of motion and neck supple.     Neurological: She is alert and oriented to person, place, and time. She has normal strength. No cranial nerve deficit or sensory deficit. GCS score is 15. GCS eye subscore is 4. GCS verbal subscore is 5. GCS motor subscore is 6.   Skin: Skin is warm and dry. Capillary refill takes less than 2 seconds. No rash noted.   Psychiatric: She has a normal mood and affect. Her behavior is normal.         ED Course   Procedures  Labs Reviewed   POCT URINALYSIS W/O SCOPE - Abnormal; Notable for the following components:       Result Value    Ketones, UA 1+ (*)     Spec Grav UA >=1.030 (*)     Protein, UA 2+ (*)     All other components within normal limits   POCT CMP - Abnormal; Notable for the following components:    POC Chloride 97 (*)     POC Creatinine 0.3 (*)     All other components within normal limits   POCT GLUCOSE - Abnormal; Notable for the following components:    POCT Glucose 134 (*)     All other components within normal limits   TROPONIN ISTAT   SARS-COV-2 (COVID-19) QUALITATIVE PCR   SARS-COV-2 RDRP GENE    Narrative:     This test utilizes isothermal nucleic acid amplification   technology to detect the SARS-CoV-2 RdRp nucleic acid segment.   The analytical sensitivity (limit of detection) is 125 genome   equivalents/mL.   A POSITIVE result implies infection with the SARS-CoV-2 virus;   the patient is presumed to be contagious.     A NEGATIVE result means that SARS-CoV-2 nucleic acids are not   present above the limit of detection. A NEGATIVE result should be   treated as presumptive. It does not rule out the possibility of   COVID-19 and should not be the sole basis for treatment decisions.   If COVID-19 is strongly suspected based on  clinical and exposure   history, re-testing using an alternate molecular assay should be   considered.   This test is only for use under the Food and Drug   Administration s Emergency Use Authorization (EUA).   Commercial kits are provided by [employer].   Performance characteristics of the EUA have been independently   verified by [facility] Department of   Pathology and Laboratory Medicine.   _________________________________________________________________   The authorized Fact Sheet for Healthcare Providers and the authorized Fact   Sheet for Patients of the ID NOW COVID-19 are available on the FDA   website:     https://www.fda.gov/media/440554/download  https://www.fda.gov/media/446292/download       POCT CBC   POCT URINALYSIS W/O SCOPE   POCT CMP   POCT B-TYPE NATRIURETIC PEPTIDE (BNP)   POCT TROPONIN   POCT STREP A, RAPID   POCT RAPID INFLUENZA A/B   POCT B-TYPE NATRIURETIC PEPTIDE (BNP)                  Imaging Results          X-Ray Chest AP Portable (Final result)  Result time 12/30/21 11:31:51    Final result by Josr Almanzar MD (12/30/21 11:31:51)                 Impression:      Prominent interstitial markings could relate to pulmonary vascular congestion/edema, noting that atypical or viral infectious etiology could appear similar by imaging.      Electronically signed by: Josr Almanzar  Date:    12/30/2021  Time:    11:31             Narrative:    EXAMINATION:  XR CHEST AP PORTABLE    CLINICAL HISTORY:  Asthma;    TECHNIQUE:  Single frontal view of the chest was performed.    COMPARISON:  Chest radiograph performed 04/04/2021.    FINDINGS:  Cardiac monitoring leads overlie the chest.  Grossly unchanged cardiomediastinal contours, again noting borderline enlargement of cardiac silhouette.  Indistinctness of the central pulmonary vasculature is noted.  Prominent interstitial markings are noted.  Right lateral basilar calcified granuloma again noted.  No definite pneumothorax  or large volume pleural effusion.  No acute findings are seen in the visualized abdomen.  Postsurgical changes are noted in the upper abdomen.  Osseous and soft tissue structures without definite acute change.                                 Medications   albuterol sulfate nebulizer solution 10 mg (10 mg Nebulization Given 12/30/21 1120)   ipratropium 0.02 % nebulizer solution 500 mcg (500 mcg Nebulization Given 12/30/21 1121)   methylPREDNISolone sodium succinate injection 125 mg (125 mg Intravenous Given 12/30/21 1147)     Medical Decision Making:   History:   Old Medical Records: I decided to obtain old medical records.  Independently Interpreted Test(s):   I have ordered and independently interpreted X-rays - see prior notes.  Clinical Tests:   Lab Tests: Ordered and Reviewed  Radiological Study: Ordered and Reviewed  Chief complaint:  Asthma exacerbation  Differential diagnosis:  Asthma, viral illness, influenza a, influenza B, COVID.    Treatment in the ED: PE  Patient reports feeling better after treatment in the ER.    Patient feeling better after DuoNeb treatment.  O2 sats 94 percent and above.  Discussed treatment, prescriptions, labs, and imaging results.    Fill and take prescriptions as directed.  Return to the ED if symptoms worsen or do not resolve.   Answered questions and discussed discharge plan.    Patient feels better and is ready for discharge.  Follow up with PCP/specialist in 1 day.          Scribe Attestation:   Scribe #1: I performed the above scribed service and the documentation accurately describes the services I performed. I attest to the accuracy of the note.         I, Dr. Lary Sweet, personally performed the services described in this documentation. This document was produced by a scribe under my direction and in my presence. All medical record entries made by the scribe were at my direction and in my presence.  I have reviewed the chart and agree that the record reflects my  personal performance and is accurate and complete. Lary Sweet DO.     12/31/2021 12:48 PM            Clinical Impression:   Final diagnoses:  [J45.901] Exacerbation of asthma, unspecified asthma severity, unspecified whether persistent (Primary)          ED Disposition Condition    Discharge Stable        ED Prescriptions     Medication Sig Dispense Start Date End Date Auth. Provider    predniSONE (DELTASONE) 20 MG tablet Take 2 tablets (40 mg total) by mouth once daily. for 5 days 10 tablet 12/30/2021 1/4/2022 Lary Sweet DO    famotidine (PEPCID) 20 MG tablet Take 1 tablet (20 mg total) by mouth 2 (two) times daily. 20 tablet 12/30/2021 12/30/2022 Lary Sweet DO    albuterol (PROVENTIL/VENTOLIN HFA) 90 mcg/actuation inhaler Inhale 2 puffs into the lungs every 6 (six) hours as needed for Wheezing. Use with spacer  Dispense with 1 spacer 18 g 12/30/2021 12/30/2022 Lary Sweet DO    albuterol sulfate 2.5 mg/0.5 mL Nebu Take 2.5 mg by nebulization every 4 (four) hours as needed (As needed for shortness of breath). Rescue 30 each 12/30/2021 12/30/2022 Lary Sweet DO    fluticasone propionate (FLONASE) 50 mcg/actuation nasal spray 1 spray (50 mcg total) by Each Nostril route 2 (two) times daily. 16 g 12/30/2021  Lary Sweet DO    loratadine (CLARITIN) 10 mg tablet Take 1 tablet (10 mg total) by mouth once daily. 60 tablet 12/30/2021 12/30/2022 Lary Sweet DO        Follow-up Information     Follow up With Specialties Details Why Contact Info    Donaldo Pena MD Internal Medicine, Wound Care Schedule an appointment as soon as possible for a visit in 1 day  [address] [facility] [address]  [phone]      [facility] [place] [facility] Emergency Medicine Go to  Please go to [facility] emergency department if symptoms worsen [address]  [facility] [id number] [phone]           Lary Sweet DO  12/31/21 [id number]

## 2021-12-30 NOTE — Clinical Note
"Audrey House" Rosa Isela was seen and treated in our emergency department on 12/30/2021.  She may return to work on 01/01/2022.       If you have any questions or concerns, please don't hesitate to call.      Lary Sweet, DO"

## 2022-01-03 ENCOUNTER — TELEPHONE (OUTPATIENT)
Dept: FAMILY MEDICINE | Facility: CLINIC | Age: 52
End: 2022-01-03
Payer: MEDICAID

## 2022-01-03 DIAGNOSIS — R05.9 COUGH: Primary | ICD-10-CM

## 2022-01-03 DIAGNOSIS — U07.1 LAB TEST POSITIVE FOR DETECTION OF COVID-19 VIRUS: Primary | ICD-10-CM

## 2022-01-03 LAB — SARS-COV-2 RNA RESP QL NAA+PROBE: DETECTED

## 2022-01-03 RX ORDER — PROMETHAZINE HYDROCHLORIDE AND DEXTROMETHORPHAN HYDROBROMIDE 6.25; 15 MG/5ML; MG/5ML
5 SYRUP ORAL EVERY 8 HOURS PRN
Qty: 118 ML | Refills: 0 | Status: SHIPPED | OUTPATIENT
Start: 2022-01-03 | End: 2022-01-13

## 2022-01-03 NOTE — TELEPHONE ENCOUNTER
Patient notified medication sent to requested pharmacy. Instructed to contact office with any questions or concerns

## 2022-01-03 NOTE — TELEPHONE ENCOUNTER
----- Message from Antonino Owens LPN sent at 1/3/2022 11:51 AM CST -----    ----- Message -----  From: Desirae Mishra  Sent: 1/3/2022  10:30 AM CST  To: Becky Fulton Staff    Type: Patient Call Back    Who called: Self     What is the request in detail: Pt is calling because she went to the stand alone ER in [place] and she tested positive for COVID and she is wondering what her doctor would like her to do because the ER gave her medication and it helps a little but she states that she was told to contact her doctor because he may want to put her in the hospital due to her other health conditions    Can the clinic reply by [facility]? Call back    Would the patient rather a call back or a response via [facility]? Call back    Best call back number: [phone]

## 2022-01-03 NOTE — TELEPHONE ENCOUNTER
----- Message from Desirae Mishra sent at 1/3/2022 10:27 AM CST -----  Type: Patient Call Back    Who called: Self     What is the request in detail: Pt is calling because she went to the stand alone ER in [place] and she tested positive for COVID and she is wondering what her doctor would like her to do because the ER gave her medication and it helps a little but she states that she was told to contact her doctor because he may want to put her in the hospital due to her other health conditions    Can the clinic reply by [facility]? Call back    Would the patient rather a call back or a response via [facility]? Call back    Best call back number: [phone]

## 2022-01-03 NOTE — TELEPHONE ENCOUNTER
----- Message from Risa Li sent at 1/3/2022 12:56 PM CST -----  Type: Patient Call Back       What is the request in detail:  pt calling to speak to a nurse regarding testing positive for covid. Pt requesting cough syrup.      Can the clinic reply by [facility]? No       Would the patient rather a call back or a response via [facility]? Call back       Best call back number: [phone]        [id number]  [place] [address] AT [address]  [place] [address]  Phone: [phone] Fax: [phone]            Thank you.

## 2022-01-20 ENCOUNTER — PATIENT OUTREACH (OUTPATIENT)
Dept: ADMINISTRATIVE | Facility: OTHER | Age: 52
End: 2022-01-20
Payer: MEDICAID

## 2022-01-20 DIAGNOSIS — Z12.11 SCREEN FOR COLON CANCER: Primary | ICD-10-CM

## 2022-02-03 ENCOUNTER — OFFICE VISIT (OUTPATIENT)
Dept: FAMILY MEDICINE | Facility: CLINIC | Age: 52
End: 2022-02-03
Payer: MEDICAID

## 2022-02-03 VITALS
OXYGEN SATURATION: 96 % | HEIGHT: 64 IN | WEIGHT: 256.63 LBS | SYSTOLIC BLOOD PRESSURE: 136 MMHG | TEMPERATURE: 98 F | HEART RATE: 96 BPM | DIASTOLIC BLOOD PRESSURE: 64 MMHG | BODY MASS INDEX: 43.81 KG/M2

## 2022-02-03 DIAGNOSIS — G89.29 CHRONIC BILATERAL LOW BACK PAIN WITH LEFT-SIDED SCIATICA: ICD-10-CM

## 2022-02-03 DIAGNOSIS — M54.42 CHRONIC BILATERAL LOW BACK PAIN WITH LEFT-SIDED SCIATICA: ICD-10-CM

## 2022-02-03 DIAGNOSIS — G47.33 OSA (OBSTRUCTIVE SLEEP APNEA): ICD-10-CM

## 2022-02-03 DIAGNOSIS — E11.42 TYPE 2 DIABETES MELLITUS WITH DIABETIC POLYNEUROPATHY, WITHOUT LONG-TERM CURRENT USE OF INSULIN: Primary | ICD-10-CM

## 2022-02-03 DIAGNOSIS — Z23 NEED FOR INFLUENZA VACCINATION: ICD-10-CM

## 2022-02-03 DIAGNOSIS — E66.01 MORBID OBESITY: ICD-10-CM

## 2022-02-03 DIAGNOSIS — F31.9 BIPOLAR 1 DISORDER: ICD-10-CM

## 2022-02-03 PROCEDURE — 1159F PR MEDICATION LIST DOCUMENTED IN MEDICAL RECORD: ICD-10-PCS | Mod: CPTII,,, | Performed by: INTERNAL MEDICINE

## 2022-02-03 PROCEDURE — 3008F PR BODY MASS INDEX (BMI) DOCUMENTED: ICD-10-PCS | Mod: CPTII,,, | Performed by: INTERNAL MEDICINE

## 2022-02-03 PROCEDURE — 99214 PR OFFICE/OUTPT VISIT, EST, LEVL IV, 30-39 MIN: ICD-10-PCS | Mod: S$PBB,,, | Performed by: INTERNAL MEDICINE

## 2022-02-03 PROCEDURE — 3078F PR MOST RECENT DIASTOLIC BLOOD PRESSURE < 80 MM HG: ICD-10-PCS | Mod: CPTII,,, | Performed by: INTERNAL MEDICINE

## 2022-02-03 PROCEDURE — 99999 PR PBB SHADOW E&M-EST. PATIENT-LVL V: ICD-10-PCS | Mod: PBBFAC,,, | Performed by: INTERNAL MEDICINE

## 2022-02-03 PROCEDURE — 3051F HG A1C>EQUAL 7.0%<8.0%: CPT | Mod: CPTII,,, | Performed by: INTERNAL MEDICINE

## 2022-02-03 PROCEDURE — 3051F PR MOST RECENT HEMOGLOBIN A1C LEVEL 7.0 - < 8.0%: ICD-10-PCS | Mod: CPTII,,, | Performed by: INTERNAL MEDICINE

## 2022-02-03 PROCEDURE — 3075F SYST BP GE 130 - 139MM HG: CPT | Mod: CPTII,,, | Performed by: INTERNAL MEDICINE

## 2022-02-03 PROCEDURE — 3075F PR MOST RECENT SYSTOLIC BLOOD PRESS GE 130-139MM HG: ICD-10-PCS | Mod: CPTII,,, | Performed by: INTERNAL MEDICINE

## 2022-02-03 PROCEDURE — 3078F DIAST BP <80 MM HG: CPT | Mod: CPTII,,, | Performed by: INTERNAL MEDICINE

## 2022-02-03 PROCEDURE — 1160F RVW MEDS BY RX/DR IN RCRD: CPT | Mod: CPTII,,, | Performed by: INTERNAL MEDICINE

## 2022-02-03 PROCEDURE — 99214 OFFICE O/P EST MOD 30 MIN: CPT | Mod: S$PBB,,, | Performed by: INTERNAL MEDICINE

## 2022-02-03 PROCEDURE — 99999 PR PBB SHADOW E&M-EST. PATIENT-LVL V: CPT | Mod: PBBFAC,,, | Performed by: INTERNAL MEDICINE

## 2022-02-03 PROCEDURE — 1160F PR REVIEW ALL MEDS BY PRESCRIBER/CLIN PHARMACIST DOCUMENTED: ICD-10-PCS | Mod: CPTII,,, | Performed by: INTERNAL MEDICINE

## 2022-02-03 PROCEDURE — 1159F MED LIST DOCD IN RCRD: CPT | Mod: CPTII,,, | Performed by: INTERNAL MEDICINE

## 2022-02-03 PROCEDURE — 3008F BODY MASS INDEX DOCD: CPT | Mod: CPTII,,, | Performed by: INTERNAL MEDICINE

## 2022-02-03 PROCEDURE — 90686 IIV4 VACC NO PRSV 0.5 ML IM: CPT | Mod: PBBFAC,PN

## 2022-02-03 PROCEDURE — 99215 OFFICE O/P EST HI 40 MIN: CPT | Mod: PBBFAC,PN | Performed by: INTERNAL MEDICINE

## 2022-02-03 RX ORDER — METHOCARBAMOL 500 MG/1
500 TABLET, FILM COATED ORAL EVERY 6 HOURS PRN
Qty: 80 TABLET | Refills: 0 | Status: SHIPPED | OUTPATIENT
Start: 2022-02-03 | End: 2022-02-09

## 2022-02-03 RX ORDER — TRAMADOL HYDROCHLORIDE 50 MG/1
50 TABLET ORAL EVERY 8 HOURS PRN
Qty: 28 TABLET | Refills: 0 | Status: SHIPPED | OUTPATIENT
Start: 2022-02-03 | End: 2022-03-29 | Stop reason: SDUPTHER

## 2022-02-03 NOTE — PROGRESS NOTES
"Subjective:       Patient ID: Audrey Natarajan is a 51 y.o. female.    Chief Complaint: Follow-up (4 month)    F/u chronic conditions    HPI: 52 y/o w/ copd dm htn obesity bipolar disorder presents for scheduled follow up. Has recently moved to apartment owned by her grandparents still in process of moving. Has been wearing rubber boots due to mud in her front yard but has recently received molded CROW shoe for right foot. Back is about the same using nsaid and muslce relaxer up to day iwht tramadol for break through pain no recent ED visits. Did test positive for COVID last month feels breathing has returned to baseline using laba/ics daily with good effect (feels since using this daily not needing albuterol as frequently) due for flu vaccine    Review of Systems   Constitutional: Negative for activity change, appetite change, fatigue, fever and unexpected weight change.   HENT: Negative for ear pain, rhinorrhea and sore throat.    Eyes: Negative for discharge and visual disturbance.   Respiratory: Negative for chest tightness, shortness of breath and wheezing.    Cardiovascular: Negative for chest pain, palpitations and leg swelling.   Gastrointestinal: Negative for abdominal pain, constipation and diarrhea.   Endocrine: Negative for cold intolerance and heat intolerance.   Genitourinary: Negative for dysuria and hematuria.   Musculoskeletal: Positive for back pain. Negative for joint swelling and neck stiffness.   Skin: Negative for rash.   Neurological: Negative for dizziness, syncope, weakness and headaches.   Psychiatric/Behavioral: Negative for suicidal ideas.       Objective:     Vitals:    02/03/22 1351   BP: 136/64   BP Location: Left arm   Patient Position: Sitting   BP Method: Large (Manual)   Pulse: 96   Temp: 98.4 °F (36.9 °C)   TempSrc: Oral   SpO2: 96%   Weight: 116.4 kg (256 lb 9.9 oz)   Height: 5' 4" (1.626 m)          Physical Exam  Constitutional:       Appearance: She is well-developed and " well-nourished. She is obese.   HENT:      Head: Normocephalic and atraumatic.   Eyes:      Conjunctiva/sclera: Conjunctivae normal.   Cardiovascular:      Rate and Rhythm: Normal rate and regular rhythm.      Heart sounds: No murmur heard.  No friction rub. No gallop.    Pulmonary:      Effort: Pulmonary effort is normal.      Breath sounds: Normal breath sounds. No wheezing or rales.   Abdominal:      Palpations: Abdomen is soft.   Musculoskeletal:         General: No tenderness or edema. Normal range of motion.      Cervical back: Normal range of motion.   Skin:     General: Skin is warm and dry.   Neurological:      Mental Status: She is alert and oriented to person, place, and time.      Cranial Nerves: No cranial nerve deficit.   Psychiatric:         Mood and Affect: Mood and affect normal.      Comments: Tangential pressured speech but redirectable not responding to internal stimuli         Assessment and Plan   1. Type 2 diabetes mellitus with diabetic polyneuropathy, without long-term current use of insulin  Repeat a1c for quality of control  - Hemoglobin A1C; Future  - Comprehensive Metabolic Panel; Future    2. Bipolar 1 disorder  Continue follow up with psychiatrist    3. SHAWN (obstructive sleep apnea)  Needs repeat sleep study to qualify for new cpap    4. Morbid obesity  The patient is asked to make an attempt to improve diet and exercise patterns to aid in medical management of this problem.      5. Need for influenza vaccination  Flu vaccine today  - Influenza - Quadrivalent *Preferred* (6 months+) (PF)

## 2022-02-09 ENCOUNTER — OFFICE VISIT (OUTPATIENT)
Dept: PULMONOLOGY | Facility: CLINIC | Age: 52
End: 2022-02-09
Payer: MEDICAID

## 2022-02-09 VITALS
SYSTOLIC BLOOD PRESSURE: 151 MMHG | HEIGHT: 64 IN | WEIGHT: 252.88 LBS | HEART RATE: 103 BPM | DIASTOLIC BLOOD PRESSURE: 83 MMHG | BODY MASS INDEX: 43.17 KG/M2 | OXYGEN SATURATION: 96 %

## 2022-02-09 DIAGNOSIS — R09.81 NASAL CONGESTION: ICD-10-CM

## 2022-02-09 DIAGNOSIS — G89.29 CHRONIC BILATERAL LOW BACK PAIN WITH LEFT-SIDED SCIATICA: ICD-10-CM

## 2022-02-09 DIAGNOSIS — G47.00 INSOMNIA, UNSPECIFIED TYPE: ICD-10-CM

## 2022-02-09 DIAGNOSIS — G47.33 OSA (OBSTRUCTIVE SLEEP APNEA): ICD-10-CM

## 2022-02-09 DIAGNOSIS — M54.42 CHRONIC BILATERAL LOW BACK PAIN WITH LEFT-SIDED SCIATICA: ICD-10-CM

## 2022-02-09 PROCEDURE — 3079F PR MOST RECENT DIASTOLIC BLOOD PRESSURE 80-89 MM HG: ICD-10-PCS | Mod: CPTII,,, | Performed by: INTERNAL MEDICINE

## 2022-02-09 PROCEDURE — 99999 PR PBB SHADOW E&M-EST. PATIENT-LVL V: CPT | Mod: PBBFAC,,, | Performed by: INTERNAL MEDICINE

## 2022-02-09 PROCEDURE — 4010F PR ACE/ARB THEARPY RXD/TAKEN: ICD-10-PCS | Mod: CPTII,,, | Performed by: INTERNAL MEDICINE

## 2022-02-09 PROCEDURE — 3079F DIAST BP 80-89 MM HG: CPT | Mod: CPTII,,, | Performed by: INTERNAL MEDICINE

## 2022-02-09 PROCEDURE — 3077F PR MOST RECENT SYSTOLIC BLOOD PRESSURE >= 140 MM HG: ICD-10-PCS | Mod: CPTII,,, | Performed by: INTERNAL MEDICINE

## 2022-02-09 PROCEDURE — 99204 PR OFFICE/OUTPT VISIT, NEW, LEVL IV, 45-59 MIN: ICD-10-PCS | Mod: S$PBB,,, | Performed by: INTERNAL MEDICINE

## 2022-02-09 PROCEDURE — 99204 OFFICE O/P NEW MOD 45 MIN: CPT | Mod: S$PBB,,, | Performed by: INTERNAL MEDICINE

## 2022-02-09 PROCEDURE — 3008F BODY MASS INDEX DOCD: CPT | Mod: CPTII,,, | Performed by: INTERNAL MEDICINE

## 2022-02-09 PROCEDURE — 1159F MED LIST DOCD IN RCRD: CPT | Mod: CPTII,,, | Performed by: INTERNAL MEDICINE

## 2022-02-09 PROCEDURE — 99999 PR PBB SHADOW E&M-EST. PATIENT-LVL V: ICD-10-PCS | Mod: PBBFAC,,, | Performed by: INTERNAL MEDICINE

## 2022-02-09 PROCEDURE — 1159F PR MEDICATION LIST DOCUMENTED IN MEDICAL RECORD: ICD-10-PCS | Mod: CPTII,,, | Performed by: INTERNAL MEDICINE

## 2022-02-09 PROCEDURE — 99215 OFFICE O/P EST HI 40 MIN: CPT | Mod: PBBFAC | Performed by: INTERNAL MEDICINE

## 2022-02-09 PROCEDURE — 3008F PR BODY MASS INDEX (BMI) DOCUMENTED: ICD-10-PCS | Mod: CPTII,,, | Performed by: INTERNAL MEDICINE

## 2022-02-09 PROCEDURE — 4010F ACE/ARB THERAPY RXD/TAKEN: CPT | Mod: CPTII,,, | Performed by: INTERNAL MEDICINE

## 2022-02-09 PROCEDURE — 3077F SYST BP >= 140 MM HG: CPT | Mod: CPTII,,, | Performed by: INTERNAL MEDICINE

## 2022-02-09 RX ORDER — IBUPROFEN 600 MG/1
600 TABLET ORAL 3 TIMES DAILY
COMMUNITY
Start: 2022-02-08 | End: 2022-04-11

## 2022-02-09 RX ORDER — INHALER,ASSIST DEVICE,LG MASK
SPACER (EA) MISCELLANEOUS
Status: ON HOLD | COMMUNITY
Start: 2021-12-30 | End: 2022-05-13 | Stop reason: HOSPADM

## 2022-02-09 RX ORDER — METHOCARBAMOL 500 MG/1
TABLET, FILM COATED ORAL
Qty: 80 TABLET | Refills: 0 | Status: ON HOLD | OUTPATIENT
Start: 2022-02-09 | End: 2022-04-18 | Stop reason: HOSPADM

## 2022-02-09 RX ORDER — DUPILUMAB 300 MG/2ML
INJECTION, SOLUTION SUBCUTANEOUS
Status: ON HOLD | COMMUNITY
Start: 2022-01-11 | End: 2022-05-13 | Stop reason: HOSPADM

## 2022-02-09 RX ORDER — RISPERIDONE 4 MG/1
4 TABLET ORAL NIGHTLY
Status: ON HOLD | COMMUNITY
Start: 2021-12-23 | End: 2022-04-18 | Stop reason: HOSPADM

## 2022-02-09 RX ORDER — TRIAMCINOLONE ACETONIDE 1 MG/G
OINTMENT TOPICAL 3 TIMES DAILY
Status: ON HOLD | COMMUNITY
Start: 2021-12-18 | End: 2022-04-18 | Stop reason: HOSPADM

## 2022-02-09 RX ORDER — MUPIROCIN 20 MG/G
OINTMENT TOPICAL 2 TIMES DAILY
Status: ON HOLD | COMMUNITY
Start: 2021-12-18 | End: 2022-04-18 | Stop reason: HOSPADM

## 2022-02-09 RX ORDER — METRONIDAZOLE 10 MG/G
1 GEL TOPICAL DAILY
Status: ON HOLD | COMMUNITY
Start: 2022-01-03 | End: 2022-04-18 | Stop reason: HOSPADM

## 2022-02-09 NOTE — TELEPHONE ENCOUNTER
Care Due:                  Date            Visit Type   Department     Provider  --------------------------------------------------------------------------------                                [facility]                              PRIMARY      MEDICINE/  Last Visit: 02-      CARE ([facility])   INTERNAL MED   Donaldo Pena                              [facility]                              PRIMARY      MEDICINE/  Next Visit: 04-      CARE ([facility])   INTERNAL MED   Donaldo Pena                                                            Last  Test          Frequency    Reason                     Performed    Due Date  --------------------------------------------------------------------------------    Lipid Panel.  12 months..  pravastatin..............  06- 06-    Powered by [employer]. Reference number: [id number].   2/09/2022 6:00:07 AM CST
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
## 2022-02-10 PROBLEM — R09.81 NASAL CONGESTION: Status: ACTIVE | Noted: 2022-02-10

## 2022-02-10 PROBLEM — G47.00 INSOMNIA: Status: ACTIVE | Noted: 2022-02-10

## 2022-02-15 ENCOUNTER — OFFICE VISIT (OUTPATIENT)
Dept: OBSTETRICS AND GYNECOLOGY | Facility: CLINIC | Age: 52
End: 2022-02-15
Payer: MEDICAID

## 2022-02-15 ENCOUNTER — HOSPITAL ENCOUNTER (OUTPATIENT)
Dept: SLEEP MEDICINE | Facility: HOSPITAL | Age: 52
Discharge: HOME | End: 2022-02-15
Attending: INTERNAL MEDICINE
Payer: MEDICAID

## 2022-02-15 VITALS
HEIGHT: 64 IN | DIASTOLIC BLOOD PRESSURE: 68 MMHG | SYSTOLIC BLOOD PRESSURE: 136 MMHG | BODY MASS INDEX: 41.77 KG/M2 | WEIGHT: 244.69 LBS

## 2022-02-15 DIAGNOSIS — Z01.419 WELL WOMAN EXAM WITH ROUTINE GYNECOLOGICAL EXAM: Primary | ICD-10-CM

## 2022-02-15 DIAGNOSIS — G47.33 OSA (OBSTRUCTIVE SLEEP APNEA): ICD-10-CM

## 2022-02-15 PROCEDURE — 95806 PR SLEEP STUDY, UNATTENDED, SIMUL RECORD HR/O2 SAT/RESP FLOW/RESP EFFT: ICD-10-PCS | Mod: 26,52,, | Performed by: INTERNAL MEDICINE

## 2022-02-15 PROCEDURE — 99396 PR PREVENTIVE VISIT,EST,40-64: ICD-10-PCS | Mod: S$PBB,,, | Performed by: OBSTETRICS & GYNECOLOGY

## 2022-02-15 PROCEDURE — 88175 CYTOPATH C/V AUTO FLUID REDO: CPT | Performed by: OBSTETRICS & GYNECOLOGY

## 2022-02-15 PROCEDURE — 99999 PR PBB SHADOW E&M-EST. PATIENT-LVL IV: ICD-10-PCS | Mod: PBBFAC,,, | Performed by: OBSTETRICS & GYNECOLOGY

## 2022-02-15 PROCEDURE — 99999 PR PBB SHADOW E&M-EST. PATIENT-LVL IV: CPT | Mod: PBBFAC,,, | Performed by: OBSTETRICS & GYNECOLOGY

## 2022-02-15 PROCEDURE — 99214 OFFICE O/P EST MOD 30 MIN: CPT | Mod: PBBFAC,25 | Performed by: OBSTETRICS & GYNECOLOGY

## 2022-02-15 PROCEDURE — 95806 SLEEP STUDY UNATT&RESP EFFT: CPT | Mod: 26,52,, | Performed by: INTERNAL MEDICINE

## 2022-02-15 PROCEDURE — 99396 PREV VISIT EST AGE 40-64: CPT | Mod: S$PBB,,, | Performed by: OBSTETRICS & GYNECOLOGY

## 2022-02-15 PROCEDURE — 4010F ACE/ARB THERAPY RXD/TAKEN: CPT | Mod: CPTII,,, | Performed by: OBSTETRICS & GYNECOLOGY

## 2022-02-15 PROCEDURE — 3075F SYST BP GE 130 - 139MM HG: CPT | Mod: CPTII,,, | Performed by: OBSTETRICS & GYNECOLOGY

## 2022-02-15 PROCEDURE — 95800 SLP STDY UNATTENDED: CPT

## 2022-02-15 PROCEDURE — 1159F MED LIST DOCD IN RCRD: CPT | Mod: CPTII,,, | Performed by: OBSTETRICS & GYNECOLOGY

## 2022-02-15 PROCEDURE — 3075F PR MOST RECENT SYSTOLIC BLOOD PRESS GE 130-139MM HG: ICD-10-PCS | Mod: CPTII,,, | Performed by: OBSTETRICS & GYNECOLOGY

## 2022-02-15 PROCEDURE — 4010F PR ACE/ARB THEARPY RXD/TAKEN: ICD-10-PCS | Mod: CPTII,,, | Performed by: OBSTETRICS & GYNECOLOGY

## 2022-02-15 PROCEDURE — 3078F DIAST BP <80 MM HG: CPT | Mod: CPTII,,, | Performed by: OBSTETRICS & GYNECOLOGY

## 2022-02-15 PROCEDURE — 3008F BODY MASS INDEX DOCD: CPT | Mod: CPTII,,, | Performed by: OBSTETRICS & GYNECOLOGY

## 2022-02-15 PROCEDURE — 87624 HPV HI-RISK TYP POOLED RSLT: CPT | Performed by: OBSTETRICS & GYNECOLOGY

## 2022-02-15 PROCEDURE — 3078F PR MOST RECENT DIASTOLIC BLOOD PRESSURE < 80 MM HG: ICD-10-PCS | Mod: CPTII,,, | Performed by: OBSTETRICS & GYNECOLOGY

## 2022-02-15 PROCEDURE — 1159F PR MEDICATION LIST DOCUMENTED IN MEDICAL RECORD: ICD-10-PCS | Mod: CPTII,,, | Performed by: OBSTETRICS & GYNECOLOGY

## 2022-02-15 PROCEDURE — 3008F PR BODY MASS INDEX (BMI) DOCUMENTED: ICD-10-PCS | Mod: CPTII,,, | Performed by: OBSTETRICS & GYNECOLOGY

## 2022-02-15 PROCEDURE — 1160F PR REVIEW ALL MEDS BY PRESCRIBER/CLIN PHARMACIST DOCUMENTED: ICD-10-PCS | Mod: CPTII,,, | Performed by: OBSTETRICS & GYNECOLOGY

## 2022-02-15 PROCEDURE — 1160F RVW MEDS BY RX/DR IN RCRD: CPT | Mod: CPTII,,, | Performed by: OBSTETRICS & GYNECOLOGY

## 2022-02-15 RX ORDER — AMMONIUM LACTATE 12 G/100G
LOTION TOPICAL
Status: ON HOLD | COMMUNITY
Start: 2022-02-12 | End: 2022-04-18 | Stop reason: HOSPADM

## 2022-02-15 NOTE — PROGRESS NOTES
Subjective:       Patient ID: Audrey Natarajan is a 51 y.o. female.    Chief Complaint:  Well Woman (Last normal pap was 02/10/2018 and last normal mammogram was 10/26/2021)      History of Present Illness  HPI  Annual Exam-Postmenopausal  Patient presents for annual exam. The patient has no complaints today. The patient is not currently sexually active. GYN screening history: last pap: approximate date 2/10/2018 and was normal and last mammogram: approximate date 10/26/2021 and was normal. The patient is not taking hormone replacement therapy. Patient denies post-menopausal vaginal bleeding. The patient wears seatbelts: yes. The patient participates in regular exercise: no. Has the patient ever been transfused or tattooed?: yes. The patient reports that there is not domestic violence in her life.    Significant medical and surgical history.      GYN & OB History  Patient's last menstrual period was 2011 (lmp unknown).   Date of Last Pap: No result found    OB History    Para Term  AB Living   0 0 0 0 0 0   SAB IAB Ectopic Multiple Live Births   0 0 0 0       Past Medical History:   Diagnosis Date    ADHD (attention deficit hyperactivity disorder)     Arthritis     Asthma     Bipolar 1 disorder     Cataract     COPD (chronic obstructive pulmonary disease)     Coronary artery disease     A fib    Depression     bipolar manic depresson    Diabetes mellitus     DVT of lower extremity, bilateral 2013    bilateral LE DVT. [place] filter placed.     Encounter for blood transfusion     History of blood clots 1. Left Leg=; 2.Bilateral Groin=Blood Clots=  or 2013 & 2013; 3. LLL of Lung=2013;  4. Lt. Lower Leg=2013.     Pt. had 1st Blood Clot after [id number], & Last=. Woolrich Filter= Rt.Lateral Neck.    HTN (hypertension) 2013    Pt states that she does not have hypertension    Hypercholesteremia     Irregular heartbeat     Neuromuscular  "disorder     neuropathy feet    PE (pulmonary embolism) 2013     bilat LE DVT.     Restless leg syndrome        Past Surgical History:   Procedure Laterality Date    ABDOMINAL SURGERY  2010    gastric sleeve    BILATERAL OOPHORECTOMY Bilateral 2015    CHOLECYSTECTOMY      Green' s filter Right 2012    Right Neck & Tunneled Down.    HERNIA REPAIR      "Tilly of Hernias Repaires around th [place].", pt. states    LAPAROSCOPIC CHOLECYSTECTOMY N/A 9/10/2020    Procedure: CHOLECYSTECTOMY, LAPAROSCOPIC;  Surgeon: Montrell Gutierrez MD;  Location[facility];  Service: General;  Laterality: N/A;  RN PREOP ----COVID Negative      OVARIAN CYST REMOVAL  3/13/2014    RI REMOVAL OF OVARY/TUBE(S)      SPLENECTOMY, TOTAL  2003    TONSILLECTOMY      as a child    TYMPANOSTOMY TUBE PLACEMENT      VEIN SURGERY      Lt leg       Family History   Problem Relation Age of Onset    Hypertension Father     Diabetes Father     Heart disease Father     Cataracts Father     Diabetes Paternal Grandfather     Heart disease Paternal Grandfather     No Known Problems Mother     Ovarian cancer Maternal Grandmother          from this. ? age     No Known Problems Sister     No Known Problems Brother     No Known Problems Maternal Aunt     No Known Problems Maternal Uncle     No Known Problems Paternal Aunt     No Known Problems Paternal Uncle     No Known Problems Maternal Grandfather     Ovarian cancer Paternal Grandmother     Uterine cancer Neg Hx     Breast cancer Neg Hx     Colon cancer Neg Hx     Amblyopia Neg Hx     Blindness Neg Hx     Cancer Neg Hx     Glaucoma Neg Hx     Macular degeneration Neg Hx     Retinal detachment Neg Hx     Strabismus Neg Hx     Stroke Neg Hx     Thyroid disease Neg Hx        Social History     Socioeconomic History    Marital status: Significant Other   Tobacco Use    Smoking status: Current Every Day Smoker     Packs/day: 0.50     " Years: 25.00     Pack years: 12.50     Types: Cigarettes     Last attempt to quit: 2020     Years since quittin.1    Smokeless tobacco: Never Used    Tobacco comment: still smoking 6 cigarettes each day   Substance and Sexual Activity    Alcohol use: No     Alcohol/week: 0.0 standard drinks    Drug use: No    Sexual activity: Yes     Partners: Male   Social History Narrative     from her  of 20 years    Lives by herself since 2019       Current Outpatient Medications   Medication Sig Dispense Refill    acetaminophen (TYLENOL) 500 MG tablet Take 2 tablets (1,000 mg total) by mouth every 6 (six) hours as needed for Pain. 30 tablet 0    albuterol (PROVENTIL/VENTOLIN HFA) 90 mcg/actuation inhaler Inhale 2 puffs into the lungs every 6 (six) hours as needed for Wheezing. Use with spacer  Dispense with 1 spacer 18 g 0    albuterol sulfate 2.5 mg/0.5 mL Nebu Take 2.5 mg by nebulization every 4 (four) hours as needed (As needed for shortness of breath). Rescue 30 each 0    albuterol-ipratropium (DUO-NEB) 2.5 mg-0.5 mg/3 mL nebulizer solution Take 3 mLs by nebulization every 6 (six) hours as needed for Wheezing or Shortness of Breath. Rescue 1 Box 0    aluminum-magnesium hydroxide-simethicone (MAALOX) 200-200-20 mg/5 mL Susp Take 30 mLs by mouth every 6 (six) hours as needed (indigestion). 150 mL 2    ammonium lactate (LAC-HYDRIN) 12 % lotion Apply topically.      apixaban (ELIQUIS) 5 mg Tab Take 1 tablet (5 mg total) by mouth 2 (two) times daily. 60 tablet 2    aspirin 81 MG Chew Take 1 tablet (81 mg total) by mouth once daily. 30 tablet 11    blood sugar diagnostic Strp To check BG two  times daily, to use with insurance preferred meter 100 each 1    blood-glucose meter kit To check BG once daily, to use with insurance preferred meter 1 each 0    blood-glucose meter kit To check BG two times daily, to use with insurance preferred meter 1 each 0    DUPIXENT  mg/2 mL PnIj  SMARTSI Milligram(s) SUB-Q Every 2 Weeks      EPITOL 200 mg tablet       famotidine (PEPCID) 20 MG tablet Take 1 tablet (20 mg total) by mouth 2 (two) times daily. 20 tablet 0    fluticasone propionate (FLONASE) 50 mcg/actuation nasal spray 1 spray (50 mcg total) by Each Nostril route 2 (two) times daily. 16 g 0    fluticasone-salmeterol diskus inhaler 250-50 mcg Inhale 1 puff into the lungs 2 (two) times daily. Controller 60 each 2    furosemide (LASIX) 20 MG tablet TAKE 1 TABLET(20 MG) BY MOUTH EVERY DAY 90 tablet 1    gabapentin (NEURONTIN) 300 MG capsule Take 2 capsules (600 mg total) by mouth 2 (two) times daily. 120 capsule 2    haloperidoL (HALDOL) 10 MG tablet Take 10 mg by mouth 2 (two) times daily.      hydrOXYzine HCL (ATARAX) 25 MG tablet       hydrOXYzine pamoate (VISTARIL) 50 MG Cap Take 50 mg by mouth nightly as needed.      ibuprofen (ADVIL,MOTRIN) 600 MG tablet Take 600 mg by mouth 3 (three) times daily.      lancets Misc To check BG two times daily, to use with insurance preferred meter 100 each 1    lisinopriL 10 MG tablet Take 1 tablet (10 mg total) by mouth once daily. 30 tablet 2    loratadine (CLARITIN) 10 mg tablet Take 1 tablet (10 mg total) by mouth once daily. 60 tablet 0    meloxicam (MOBIC) 15 MG tablet TAKE 1 TABLET(15 MG) BY MOUTH EVERY DAY 30 tablet 2    metFORMIN (GLUCOPHAGE) 1000 MG tablet TAKE 1 TABLET(1000 MG) BY MOUTH TWICE DAILY WITH MEALS 180 tablet 2    methocarbamoL (ROBAXIN) 500 MG Tab TAKE 1 TABLET(500 MG) BY MOUTH EVERY 6 HOURS AS NEEDED FOR BACK PAIN 80 tablet 0    metoprolol tartrate (LOPRESSOR) 50 MG tablet TAKE 1 TABLET(50 MG) BY MOUTH TWICE DAILY 180 tablet 2    metronidazole 1% (METROGEL) 1 % Gel Apply 1 application topically once daily.      multivitamin Tab Take 1 tablet by mouth once daily. 30 tablet 2    mupirocin (BACTROBAN) 2 % ointment Apply topically 2 (two) times daily.      nystatin (NYSTOP) powder APPLY TOPICALLY TO AXILLA AND  BREAST FOLDS TWICE DAILY 60 g 2    OPTICHAMBER BIGG LG MASK Spcr Inhale into the lungs.      pantoprazole (PROTONIX) 40 MG tablet Take 1 tablet (40 mg total) by mouth once daily. 30 tablet 0    polyethylene glycol (GLYCOLAX) 17 gram PwPk Take 17 g by mouth 2 (two) times daily. 72 packet 0    polyvinyl alcohol, artificial tears, (LIQUIFILM TEARS) 1.4 % ophthalmic solution Place 1 drop into both eyes 4 (four) times daily. 15 mL 2    pravastatin (PRAVACHOL) 40 MG tablet TAKE 1 TABLET(40 MG) BY MOUTH EVERY EVENING 90 tablet 3    risperiDONE (RISPERDAL) 4 MG tablet Take 4 mg by mouth nightly.      senna (SENOKOT) 8.6 mg tablet Take 1 tablet by mouth 2 (two) times a day. 60 tablet 2    triamcinolone acetonide 0.1% (KENALOG) 0.1 % ointment Apply topically 3 (three) times daily.      TRUE METRIX GLUCOSE METER [facility] CHECK BLOOD SUGAR TWICE DAILY 1 each 0    white petrolatum 86.5 % Oint Apply 1 Squirt topically 2 (two) times a day. 71 g 0    diclofenac sodium (VOLTAREN) 1 % Gel Apply 2 g topically 4 (four) times daily as needed (Apply to painful area up to 4 times a day as needed for pain). Apply to painful area 4 times a day as needed for pain 1 Tube 0    divalproex ER (DEPAKOTE) 500 MG Tb24 Take 2,500 mg by mouth.      folic acid (FOLVITE) 1 MG tablet Take 1 tablet (1 mg total) by mouth once daily. 30 tablet 2    QUEtiapine (SEROQUEL) 200 MG Tab Take 1 tablet (200 mg total) by mouth before breakfast. 30 tablet 2     Current Facility-Administered Medications   Medication Dose Route Frequency Provider Last Rate Last Admin    LIDOcaine HCL 10 mg/ml (1%) injection 1 mL  1 mL Other 1 time in Clinic/HOD Tone Mata MD        LIDOcaine-EPINEPHrine 1%-1:100,000 30 mL, LIDOcaine HCL 10 mg/ml (1%) 20 mL, sodium bicarbonate 10 mL in sodium chloride 0.9% 500 mL solution   MISCELLANEOUS 1 time in Clinic/HOD Tone Mata MD           Review of patient's allergies indicates:   Allergen Reactions     Morphine Other (See Comments)     Patient had a psychotic episode after taking Morphine  Agitation, hallucinations    Penicillins Anaphylaxis     itching    Januvia [sitagliptin] Hives       Review of Systems  Review of Systems   Constitutional: Positive for fatigue. Negative for activity change, appetite change, chills, fever and unexpected weight change.   HENT: Negative for mouth sores.    Respiratory: Negative for cough, shortness of breath and wheezing.    Cardiovascular: Positive for leg swelling. Negative for chest pain and palpitations.   Gastrointestinal: Negative for abdominal pain, bloating, blood in stool, constipation, nausea and vomiting.   Endocrine: Negative for diabetes and hot flashes.   Genitourinary: Negative for dysmenorrhea, dyspareunia, dysuria, frequency, hematuria, menorrhagia, menstrual problem, pelvic pain, urgency, vaginal bleeding, vaginal discharge, vaginal pain, urinary incontinence, postcoital bleeding and vaginal odor.   Musculoskeletal: Negative for back pain and myalgias.   Integumentary:  Negative for rash, breast mass and nipple discharge.   Neurological: Negative for seizures and headaches.   Psychiatric/Behavioral: Negative for depression and sleep disturbance. The patient is not nervous/anxious.    Breast: Negative for mass, mastodynia and nipple discharge          Objective:    Physical Exam:   Constitutional: She appears well-developed and well-nourished. No distress.   BMI of 42    HENT:   Head: Normocephalic and atraumatic.    Eyes: EOM are normal.      Pulmonary/Chest: Effort normal. No respiratory distress.   Breasts: Non-tender, no engorgement, no masses, no retraction, no discharge. Negative for lymphadenopathy.         Abdominal: Soft. She exhibits no distension. There is no abdominal tenderness. There is no rebound and no guarding.   Large vertical with multiple trochar sites.     Genitourinary:    Vagina and uterus normal.   No  no vaginal discharge in the vagina.     Genitourinary Comments: Vulva without any obvious lesions.  Urethral meatus normal size and location without any lesion.  Urethra is non-tender without stricture or discharge.  Bladder is non-tender.  Vaginal vault with good support.  Minimal white discharge noted.  No obvious lesion.  Normal rugation.  Cervix is without any cervical motion tenderness.  No obvious lesion.  Uterus is small, non-tender, normal contour.  Adnexa is without any masses or tenderness.  Perineum without obvious lesion.               Musculoskeletal: Normal range of motion. Edema present.      Comments: Significant bilateral pedal edema       Neurological: She is alert.    Skin: Skin is warm and dry.    Psychiatric: She has a normal mood and affect.          Assessment:        1. Well woman exam with routine gynecological exam    2.  Significant medical and surgical history         Plan:          I have discussed with the patient her condition.  Monthly breast examination was instructed, discussed, and encouraged.  Patient was encouraged to consume a low-calorie, low fat diet, and to increase of physical activity.  Healthy habits encouraged.  A Pap smear was performed with HR-HPV according to the USPSTF recommendations.  Mammogram was not ordered because of the combination of her age and risk factors, according to ACOG guidelines.  Gonorrhea and Chlamydia testing not performed;  HIV test not offered, again according to guidelines.  Colonoscopy discussed according to ACS guideline.    Patient is to continue her medications as prescribed.    We discussed her other medical and surgical issues including her history of DVT/PE and her IVC filter.  Encourage compliance.  She will come back to see Dr Arredondo in one year for her annual visit.  She can come back to see me sooner as necessary.  All of her questions were answered appropriately to her satisfaction.

## 2022-02-15 NOTE — PROGRESS NOTES
The patient ID was verified. She was instructed on how to turn the Home Sleep Testing device on and off, how to apply the sensors.  She was encouraged to sleep on supine position and must have 6 hours of sleep. The patient was instructed not to get the device wet and return it back to us. All questions were answered prior to patient leaving.  She was provided the after visit summary.

## 2022-02-21 LAB
CLINICAL INFO: NORMAL
CYTO CVX: NORMAL
CYTOLOGIST CVX/VAG CYTO: NORMAL
CYTOLOGIST CVX/VAG CYTO: NORMAL
CYTOLOGY CMNT CVX/VAG CYTO-IMP: NORMAL
CYTOLOGY PAP THIN PREP EXPLANATION: NORMAL
DATE OF PREVIOUS PAP: NO
DATE PREVIOUS BX: NO
GEN CATEG CVX/VAG CYTO-IMP: NORMAL
HPV I/H RISK 4 DNA CVX QL NAA+PROBE: NOT DETECTED
LMP START DATE: NORMAL
MICROORGANISM CVX/VAG CYTO: NORMAL
PATHOLOGIST CVX/VAG CYTO: NORMAL
SERVICE CMNT-IMP: NORMAL
SPECIMEN SOURCE CVX/VAG CYTO: NORMAL
STAT OF ADQ CVX/VAG CYTO-IMP: NORMAL

## 2022-02-22 ENCOUNTER — TELEPHONE (OUTPATIENT)
Dept: PULMONOLOGY | Facility: CLINIC | Age: 52
End: 2022-02-22
Payer: MEDICAID

## 2022-02-22 DIAGNOSIS — G47.33 OSA (OBSTRUCTIVE SLEEP APNEA): Primary | ICD-10-CM

## 2022-02-22 NOTE — PROCEDURES
"Dear Provider,     You have ordered sleep LAB services to perform the sleep study for Audrey Natarajan.  The sleep study that you ordered is complete.      Please find Sleep Study result in "Chart Review" under the "Media tab."      As the ordering provider, you are responsible for reviewing the results and implementing a treatment plan with your patient.    If you need a Sleep Medicine provider to explain the sleep study findings and arrange treatment for the patient, please refer patient for consultation to our Sleep Clinic via [facility] with Ambulatory Consult Sleep.    To do that please place an order for an  "Ambulatory Consult Sleep" - it will go to our clinic work queue for our Medical Assistant to contact the patient for an appointment.     For any questions, please contact our clinic staff at [phone] to talk to clinical staff.   "

## 2022-02-22 NOTE — TELEPHONE ENCOUNTER
Please let patient know that the home sleep study reconfirmed the diagnosis of sarah.  I would like to set patient up with cpap via dme of choice.  Due nationwide CPAP shortage, advise patient to expect a call from in 2 months.  Clinic follow up with md/np in approximately 8 weeks after cpap usage.

## 2022-02-25 ENCOUNTER — TELEPHONE (OUTPATIENT)
Dept: PULMONOLOGY | Facility: CLINIC | Age: 52
End: 2022-02-25
Payer: MEDICAID

## 2022-02-25 NOTE — TELEPHONE ENCOUNTER
Spoke with patient gave her info below.                    ----- Message from Iqra Taylor MA sent at 2/22/2022  3:15 PM CST -----  Please let patient know that the home sleep study reconfirmed the diagnosis of sarah.  I would like to set patient up with cpap via dme of choice.  Due nationwide CPAP shortage, advise patient to expect a call from in 2 months.  Clinic follow up with md/np in approximately 8 weeks after cpap usage.

## 2022-03-10 ENCOUNTER — OFFICE VISIT (OUTPATIENT)
Dept: URGENT CARE | Facility: CLINIC | Age: 52
End: 2022-03-10
Payer: MEDICAID

## 2022-03-10 ENCOUNTER — TELEPHONE (OUTPATIENT)
Dept: FAMILY MEDICINE | Facility: CLINIC | Age: 52
End: 2022-03-10
Payer: MEDICAID

## 2022-03-10 VITALS
SYSTOLIC BLOOD PRESSURE: 105 MMHG | HEART RATE: 89 BPM | OXYGEN SATURATION: 96 % | TEMPERATURE: 99 F | HEIGHT: 64 IN | DIASTOLIC BLOOD PRESSURE: 70 MMHG | BODY MASS INDEX: 40.46 KG/M2 | WEIGHT: 237 LBS | RESPIRATION RATE: 16 BRPM

## 2022-03-10 DIAGNOSIS — K52.9 GASTROENTERITIS: Primary | ICD-10-CM

## 2022-03-10 DIAGNOSIS — R19.7 DIARRHEA, UNSPECIFIED TYPE: ICD-10-CM

## 2022-03-10 LAB — GLUCOSE SERPL-MCNC: 178 MG/DL (ref 70–110)

## 2022-03-10 PROCEDURE — 3074F SYST BP LT 130 MM HG: CPT | Mod: CPTII,S$GLB,, | Performed by: FAMILY MEDICINE

## 2022-03-10 PROCEDURE — 1160F RVW MEDS BY RX/DR IN RCRD: CPT | Mod: CPTII,S$GLB,, | Performed by: FAMILY MEDICINE

## 2022-03-10 PROCEDURE — 3074F PR MOST RECENT SYSTOLIC BLOOD PRESSURE < 130 MM HG: ICD-10-PCS | Mod: CPTII,S$GLB,, | Performed by: FAMILY MEDICINE

## 2022-03-10 PROCEDURE — 3078F DIAST BP <80 MM HG: CPT | Mod: CPTII,S$GLB,, | Performed by: FAMILY MEDICINE

## 2022-03-10 PROCEDURE — 82962 GLUCOSE BLOOD TEST: CPT | Mod: S$GLB,,, | Performed by: FAMILY MEDICINE

## 2022-03-10 PROCEDURE — 3008F BODY MASS INDEX DOCD: CPT | Mod: CPTII,S$GLB,, | Performed by: FAMILY MEDICINE

## 2022-03-10 PROCEDURE — 1160F PR REVIEW ALL MEDS BY PRESCRIBER/CLIN PHARMACIST DOCUMENTED: ICD-10-PCS | Mod: CPTII,S$GLB,, | Performed by: FAMILY MEDICINE

## 2022-03-10 PROCEDURE — 82962 POCT GLUCOSE, HAND-HELD DEVICE: ICD-10-PCS | Mod: S$GLB,,, | Performed by: FAMILY MEDICINE

## 2022-03-10 PROCEDURE — 3008F PR BODY MASS INDEX (BMI) DOCUMENTED: ICD-10-PCS | Mod: CPTII,S$GLB,, | Performed by: FAMILY MEDICINE

## 2022-03-10 PROCEDURE — 99213 PR OFFICE/OUTPT VISIT, EST, LEVL III, 20-29 MIN: ICD-10-PCS | Mod: S$GLB,,, | Performed by: FAMILY MEDICINE

## 2022-03-10 PROCEDURE — 99213 OFFICE O/P EST LOW 20 MIN: CPT | Mod: S$GLB,,, | Performed by: FAMILY MEDICINE

## 2022-03-10 PROCEDURE — 4010F ACE/ARB THERAPY RXD/TAKEN: CPT | Mod: CPTII,S$GLB,, | Performed by: FAMILY MEDICINE

## 2022-03-10 PROCEDURE — 1159F PR MEDICATION LIST DOCUMENTED IN MEDICAL RECORD: ICD-10-PCS | Mod: CPTII,S$GLB,, | Performed by: FAMILY MEDICINE

## 2022-03-10 PROCEDURE — 1159F MED LIST DOCD IN RCRD: CPT | Mod: CPTII,S$GLB,, | Performed by: FAMILY MEDICINE

## 2022-03-10 PROCEDURE — S0119 PR ONDANSETRON, ORAL, 4MG: ICD-10-PCS | Mod: S$GLB,,, | Performed by: FAMILY MEDICINE

## 2022-03-10 PROCEDURE — S0119 ONDANSETRON 4 MG: HCPCS | Mod: S$GLB,,, | Performed by: FAMILY MEDICINE

## 2022-03-10 PROCEDURE — 3078F PR MOST RECENT DIASTOLIC BLOOD PRESSURE < 80 MM HG: ICD-10-PCS | Mod: CPTII,S$GLB,, | Performed by: FAMILY MEDICINE

## 2022-03-10 PROCEDURE — 4010F PR ACE/ARB THEARPY RXD/TAKEN: ICD-10-PCS | Mod: CPTII,S$GLB,, | Performed by: FAMILY MEDICINE

## 2022-03-10 RX ORDER — ONDANSETRON 4 MG/1
4 TABLET, ORALLY DISINTEGRATING ORAL
Status: COMPLETED | OUTPATIENT
Start: 2022-03-10 | End: 2022-03-10

## 2022-03-10 RX ORDER — LOPERAMIDE HYDROCHLORIDE 2 MG/1
2 CAPSULE ORAL 4 TIMES DAILY PRN
Qty: 40 CAPSULE | Refills: 0 | Status: SHIPPED | OUTPATIENT
Start: 2022-03-10 | End: 2022-03-20

## 2022-03-10 RX ORDER — ONDANSETRON 4 MG/1
4 TABLET, ORALLY DISINTEGRATING ORAL EVERY 12 HOURS PRN
Qty: 6 TABLET | Refills: 0 | Status: SHIPPED | OUTPATIENT
Start: 2022-03-10 | End: 2022-03-13

## 2022-03-10 RX ADMIN — ONDANSETRON 4 MG: 4 TABLET, ORALLY DISINTEGRATING ORAL at 02:03

## 2022-03-10 NOTE — PROGRESS NOTES
"Subjective:       Patient ID: Audrey Natarajan is a 51 y.o. female.    Vitals:  height is 5' 4" (1.626 m) and weight is 107.5 kg (237 lb). Her temperature is 98.6 °F (37 °C). Her blood pressure is 105/70 and her pulse is 89. Her respiration is 16 and oxygen saturation is 96%.     Chief Complaint: Diarrhea    Pt sts she has been having diarrhea and nausea x6days. She states she has also had episodes of vomiting and abd cramping. She sts she feels very weak and the diarrhea and vomiting happens most after eating. She sts she has been taking pepto bismol and zofran with temporary relief.   Provider note begins below:  Past Medical History:  No date: ADHD (attention deficit hyperactivity disorder)  No date: Arthritis  No date: Asthma  No date: Bipolar 1 disorder  No date: Cataract  No date: COPD (chronic obstructive pulmonary disease)  No date: Coronary artery disease      Comment:  A fib  No date: Depression      Comment:  bipolar manic depresson  No date: Diabetes mellitus  July 2013: DVT of lower extremity, bilateral      Comment:  bilateral LE DVT. Estelita filter placed.   No date: Encounter for blood transfusion  1. Left Leg=2003; 2.Bilateral Groin=Blood Clots= 5 or 6/ 2013 & 7/ 2013; 3. LLL of Lung=7/2013;  4. Lt. Lower Leg=7/2013. : History of   blood clots      Comment:  Pt. had 1st Blood Clot after Vephpwuiglbn=[id number], &                Last=2013. Estelita Filter= Rt.Lateral Neck.  6/6/2013: HTN (hypertension)      Comment:  Pt states that she does not have hypertension  No date: Hypercholesteremia  No date: Irregular heartbeat  No date: Neuromuscular disorder      Comment:  neuropathy feet  July 2013 : PE (pulmonary embolism)      Comment:  bilat LE DVT.   No date: Restless leg syndrome  Pt with above pmhx, presents with c/o NVD since Thursday. She reports that she has been feeling weak, she has not passed out or had near episodes. She notes similar symptoms in the past and was told she had a stomach " ----- Message from Fiona Montano MD sent at 8/6/2021 12:35 PM CDT -----  Normal mammogram-annual screening recommended    bug. Denies any recent travel, abx use. Denies any bloody stool or vomit. She took her last zofran at home this morning and had relief with that. She has been able to tolerate PO she is drinking sprite at this time in the room. She has DM, takes metformin, she does not check her glucose daily. Denies any fever or bloody stools.     Diarrhea   The current episode started in the past 7 days. The problem occurs 5 to 10 times per day. The problem has been unchanged. The patient states that diarrhea awakens her from sleep. Associated symptoms include abdominal pain, sweats and vomiting. Pertinent negatives include no arthralgias, bloating, chills, coughing, fever, headaches, increased  flatus, myalgias, URI or weight loss. Exacerbated by: Eating. She has tried anti-motility drug, increased fluids and bismuth subsalicylate for the symptoms. The treatment provided mild relief. There is no history of bowel resection, inflammatory bowel disease, irritable bowel syndrome, malabsorption, a recent abdominal surgery or short gut syndrome. DM gastroparesis       Constitution: Positive for activity change, appetite change, sweating, fatigue and generalized weakness. Negative for chills, fever and unexpected weight change.   Cardiovascular: Negative for chest pain, leg swelling, palpitations and sob on exertion.   Respiratory: Negative for chest tightness, cough, sputum production, bloody sputum, COPD, shortness of breath, stridor, wheezing and asthma.    Gastrointestinal: Positive for abdominal pain, nausea, vomiting and diarrhea. Negative for abdominal trauma, abdominal bloating, history of abdominal surgery, constipation, bright red blood in stool, dark colored stools, rectal bleeding, rectal pain, hemorrhoids, heartburn and bowel incontinence.   Musculoskeletal: Negative for joint pain and muscle ache.   Allergic/Immunologic: Negative for asthma.   Neurological: Negative for headaches.   Hematologic/Lymphatic: Positive for  history of blood clots (no longer taking eliquis).       Objective:      Physical Exam   Constitutional: She is oriented to person, place, and time. She appears well-developed.  Non-toxic appearance. She does not appear ill. No distress. obesity  HENT:   Head: Normocephalic and atraumatic.   Ears:   Right Ear: External ear normal.   Left Ear: External ear normal.   Nose: Nose normal.   Mouth/Throat: Oropharynx is clear and moist.   Eyes: EOM and lids are normal.   Neck: Trachea normal and phonation normal. Neck supple.   Abdominal: Bowel sounds are normal. Soft. There is abdominal tenderness in the right upper quadrant and left upper quadrant. There is no left CVA tenderness and no right CVA tenderness.      Comments: Patient able to transition from heel to toe without pain or grimacing. No sign of acute abdomen at this time, ambulatory without grimacing, Abdomen is soft there is upper quad tenderness present.     Musculoskeletal: Normal range of motion.         General: Normal range of motion.   Neurological: She is alert and oriented to person, place, and time.   Skin: Skin is warm, dry, intact and not diaphoretic.   Psychiatric: Her speech is normal and behavior is normal. Judgment and thought content normal.   Nursing note and vitals reviewed.        Assessment:       1. Gastroenteritis    2. Diarrhea, unspecified type        Pt tolerating PO   Results for orders placed or performed in visit on 03/10/22   POCT Glucose, Hand-Held Device   Result Value Ref Range    POC Glucose 178 (A) 70 - 110 MG/DL     *Note: Due to a large number of results and/or encounters for the requested time period, some results have not been displayed. A complete set of results can be found in Results Review.      Plan:       Try Imodium, continue Pepto-Bismol, push fluids, she is tolerating p.o..  Short duration of Zofran provided.  Have advised her to follow-up in the next 2-3 days for re-evaluation.    Discussed results/diagnosis/plan  with patient in clinic. Strict precautions given to patient to monitor for worsening signs and symptoms. Advised to follow up with PCP or specialist.    Explained side effects of medications prescribed with patient and informed him/her to discontinue use if he/she has any side effects and to inform UC or PCP if this occurs. All questions answered. Strict ED verses clinic return precautions stressed and given in depth. Advised if symptoms worsens of fail to improve he/she should go to the Emergency Room. Discharge and follow-up instructions given verbally/printed with the patient who expressed understanding and willingness to comply with my recommendations. Patient voiced understanding and in agreement with current treatment plan. Patient exits the exam room in no acute distress. Conversant and engaged during discharge discussion, verbalized understanding.      Gastroenteritis  -     POCT Glucose, Hand-Held Device  -     ondansetron disintegrating tablet 4 mg  -     ondansetron (ZOFRAN-ODT) 4 MG TbDL; Take 1 tablet (4 mg total) by mouth every 12 (twelve) hours as needed (nausea or vomiting).  Dispense: 6 tablet; Refill: 0  -     loperamide (IMODIUM) 2 mg capsule; Take 1 capsule (2 mg total) by mouth 4 (four) times daily as needed for Diarrhea.  Dispense: 40 capsule; Refill: 0    Diarrhea, unspecified type  -     POCT Glucose, Hand-Held Device  -     loperamide (IMODIUM) 2 mg capsule; Take 1 capsule (2 mg total) by mouth 4 (four) times daily as needed for Diarrhea.  Dispense: 40 capsule; Refill: 0              Additional MDM:     Heart Failure Score:   COPD = No    Patient Instructions   General Discharge Instructions   PLEASE READ YOUR DISCHARGE INSTRUCTIONS ENTIRELY AS IT CONTAINS IMPORTANT INFORMATION.  If you were prescribed a narcotic or controlled medication, do not drive or operate heavy equipment or machinery while taking these medications.  If you were prescribed antibiotics, please take them to  completion.  You must understand that you've received an Urgent Care treatment only and that you may be released before all your medical problems are known or treated. You, the patient, will arrange for follow up care as instructed.    OVER THE COUNTER RECOMMENDATIONS/SUGGESTIONS.    Make sure to stay well hydrated.    Use Nasal Saline to mechanically move any post nasal drip from your eustachian tube or from the back of your throat.    Use warm salt water gargles to ease your throat pain. Warm salt water gargles as needed for sore throat- 1/2 tsp salt to 1 cup warm water, gargle as desired.    Use an antihistamine such as Claritin, Zyrtec or Allegra to dry you out.    Use pseudoephedrine (behind the counter) to decongest. Pseudoephedrine 30 mg up to 240 mg /day. It can raise your blood pressure and give you palpitations.    Use mucinex (guaifenesin) to break up mucous up to 2400mg/day to loosen any mucous.    The mucinex DM pill has a cough suppressant that can be sedating. It can be used at night to stop the tickle at the back of your throat.    You can use Mucinex D (it has guaifenesin and a high dose of pseudoephedrine) in the mornings to help decongest.    Use Afrin in each nare for no longer than 3 days, as it is addictive. It can also dry out your mucous membranes and cause elevated blood pressure. This is especially useful if you are flying.    Use Flonase 1-2 sprays/nostril per day. It is a local acting steroid nasal spray, if you develop a bloody nose, stop using the medication immediately.    Sometimes Nyquil at night is beneficial to help you get some rest, however it is sedating and it does have an antihistamine, and tylenol.    Honey is a natural cough suppressant that can be used.    Tylenol up to 4,000 mg a day is safe for short periods and can be used for body aches, pain, and fever. However in high doses and prolonged use it can cause liver irritation.    Ibuprofen is a non-steroidal  anti-inflammatory that can be used for body aches, pain, and fever.However it can also cause stomach irritation if over used.     Follow up with your PCP or specialty clinic as instructed in the next 2-3 days if not improved or as needed. You can call [phone] to schedule an appointment with appropriate provider.      If you condition worsens, we recommend that you receive another evaluation at the emergency room immediately or contact your primary medical clinic's after hours call service to discuss your concerns.      Please return here or go to the Emergency Department for any concerns or worsening condition.   You can also call [phone] to schedule an appointment with the appropriate provider.    Please return here or go to the Emergency Department for any concerns or worsening of condition.    Thank you for choosing [facility]!    Our goal in the Urgent Care is to always provide outstanding medical care. You may receive a survey by mail or e-mail in the next week regarding your experience today. We would greatly appreciate you completing and returning the survey. Your feedback provides us with a way to recognize our staff who provide very good care, and it helps us learn how to improve when your experience was below our aspiration of excellence.      We appreciate you trusting us with your medical care. We hope you feel better soon. We will be happy to take care of you for all of your future medical needs.    Sincerely,    SHAHRZAD Sherwood  Abdominal Pain   If your condition worsens or fails to improve we recommend that you receive another evaluation at the ER immediately or contact your PCP to discuss your concerns or return here. You must understand that you've received an urgent care treatment only and that you may be released before all your medical problems are known or treated. You the patient will arrange for followup care as instructed.   Watch for any increase pain, fever,  localized pain to right lower abdomen or continued vomiting or diarrhea.   If you have diarrhea you can use Pepto Bismol; Avoid Immodium    Diarrhea, Adult ED   General Information   You came to the Emergency Department (ED) for diarrhea. Most doctors say you have diarrhea if you have 3 or more runny or watery stools or bowel movements in a day. The doctors feel that the risk of a serious cause for your diarrhea is low.  Diarrhea that starts all of a sudden is most often caused by a virus or bacteria. This will likely get better on its own after a few days. Other things can cause you to have loose stools like:  · Side effects from the medicines you take.  · Problems digesting your food.  · Problems with your digestive system.  You may be waiting on some test results. The staff will contact you if there are concerning results.  What care is needed at home?   · Call your regular doctor to let them know you were in the ED. Make a follow-up appointment if you were told to.  · Drink small amounts of fluid every 15 to 30 minutes. Good fluids to drink are water, broth, and oral electrolyte solutions. Sugar-free or very low sugar sports drinks are also OK.  · Try to eat a small amount of food. Good foods to eat are potatoes, noodles, rice, oatmeal, crackers, soup, soft vegetables, and bananas. Avoid fatty foods and dairy products.  · Wash your hands often. This will help keep others healthy. It is easy to spread diarrhea from one person to the next.  · Stay home from school or work until you have stopped having diarrhea. Do not cook food for others while you have diarrhea.  · If you were given any medicines, make sure to take them as you were told.  When do I need to get emergency help?   · Return to the ED if:   ? You have signs of severe fluid loss, such as:  § No urine for more than 8 hours.  § Feel very light-headed or like you are going to pass out.  § Feel weak like you are going to fall.  ? Your stools have a large  amount (more than 1 teaspoon or 5 mL) of blood in them.  ? You have very bad belly pain.  When do I need to call the doctor?   · You have more than 6 runny, watery stools in 24 hours.  · You have a fever of 101.3°F (38.5°C) or higher or chills that do not go away after a day.  · You have very bad belly pain.  · Your stools have a small amount (less than 1 teaspoon or 5 mL) of blood in them.  · You develop early signs of fluid loss, such as:  ? Your urine is very dark colored.  ? Your mouth is dry.  ? You have muscle cramps.  ? You have a lack of energy.  ? You feel light-headed when you get up.  · You have new or worsening symptoms.  Last Reviewed Date   2021-05-21  Consumer Information Use and Disclaimer   This information is not specific medical advice and does not replace information you receive from your health care provider. This is only a brief summary of general information. It does NOT include all information about conditions, illnesses, injuries, tests, procedures, treatments, therapies, discharge instructions or life-style choices that may apply to you. You must talk with your health care provider for complete information about your health and treatment options. This information should not be used to decide whether or not to accept your health care providers advice, instructions or recommendations. Only your health care provider has the knowledge and training to provide advice that is right for you.  Copyright   Copyright © 2021 UpToDate, Inc. and its affiliates and/or licensors. All rights reserved.     Patient Education        Viral Gastroenteritis   The Basics   Written by the doctors and editors at [employer]   What is viral gastroenteritis? -- Viral gastroenteritis is an infection that can cause diarrhea and vomiting. It happens when a person's stomach and intestines get infected with a virus (figure 1). Both adults and children can get viral gastroenteritis.  People can get the infection if  "they:  · Touch an infected person or a surface with the virus on it, and then don't wash their hands  · Eat foods or drink liquids with the virus in them. If people with the virus don't wash their hands, they can spread it to food or liquids they touch.  What are the symptoms of viral gastroenteritis? -- The infection causes diarrhea and vomiting. People can have either diarrhea or vomiting, or both. These symptoms usually start suddenly, and can be severe.  Viral gastroenteritis can also cause:  · A fever  · A headache or muscle aches  · Belly pain or cramping  · A loss of appetite  If you have diarrhea and vomiting, your body can lose too much water. Doctors call this "dehydration." Dehydration can make you feel thirsty, tired, dizzy, or confused. It can also make your urine look dark yellow.  Severe dehydration can be life-threatening. Babies, young children, and elderly people are more likely to get severe dehydration.  Do people with viral gastroenteritis need tests? -- Not usually. Their doctor or nurse should be able to tell if they have it by learning about their symptoms and doing an exam. But the doctor or nurse might do tests to check for dehydration or to see which virus is causing the infection. These tests can include:  · Blood tests  · Urine tests  · Tests on a sample of bowel movement  Is there anything I can do on my own to feel better or help my child? -- Yes. People with viral gastroenteritis need to drink enough fluids so they don't get dehydrated.  Some fluids help prevent dehydration better than others:  · Older children and adults can drink sports drinks.  · You can give babies and young children an "oral rehydration solution," such as Pedialyte. You can buy this in a store or pharmacy. If your child is vomiting, you can try to give your child a few teaspoons of fluid every few minutes.  · Babies who breastfeed can continue to breastfeed.  People with viral gastroenteritis should avoid drinks " "with a lot of sugar, like juice or soda. These can make diarrhea worse.  If you can keep food down, it's best to eat lean meats, fruits, vegetables, and whole-grain breads and cereals. Avoid eating foods with a lot of fat or sugar, which can make symptoms worse.  If you are an adult younger than 65 and you have a new bout of diarrhea, and no fever and no blood in your bowel movements, you can take medicine to stop diarrhea such as loperamide (brand name: Imodium) for 1 to 2 days. But if you are older than 65, have a fever, or have blood in your bowel movements, do not take these medicines without checking with your doctor.  Do not give medicines to stop diarrhea to children.  Should I call the doctor or nurse? -- Call the doctor or nurse if you or your child:  · Has any symptoms of dehydration  · Has diarrhea or vomiting that lasts longer than a few days  · Vomits up blood, has bloody diarrhea, or has severe belly pain  · Hasn't had anything to drink in a few hours (for children), or in many hours (for adults)  · Hasn't needed to urinate in the past 6 to 8 hours (during the day), or if your baby or young child hasn't had a wet diaper for 4 to 6 hours  How is viral gastroenteritis treated? -- Most people do not need any treatment, because their symptoms will get better on their own. But people with severe dehydration might need treatment with intravenous fluids. This involves getting fluids through an "IV" (a thin tube that goes into the vein).  Doctors do not treat viral gastroenteritis with antibiotics. That's because antibiotics treat infections that are caused by bacteria - not viruses.  Can viral gastroenteritis be prevented? -- Sometimes. To lower the chance of getting or spreading the infection, you can:  · Wash your hands with soap and water after you use the bathroom or change your child's diaper, and before you eat.  · Avoid changing your child's diaper near where you prepare food.  · Make sure your baby " "gets the rotavirus vaccine. Vaccines can prevent certain serious or deadly infections. Rotavirus is a virus that commonly causes viral gastroenteritis in children.  All topics are updated as new evidence becomes available and our peer review process is complete.  This topic retrieved from [employer] on: Sep 21, 2021.  Topic 80758 Version 11.0  Release: 29.4.2 - C29.263  © 2021 UpToDate, Inc. and/or its affiliates. All rights reserved.  figure 1: Digestive system     This drawing shows the organs in the body that process food. Together these organs are called "the digestive system," or "digestive tract." As food travels through this system, the body absorbs nutrients and water.  Graphic 38910 Version 4.0     Consumer Information Use and Disclaimer   This information is not specific medical advice and does not replace information you receive from your health care provider. This is only a brief summary of general information. It does NOT include all information about conditions, illnesses, injuries, tests, procedures, treatments, therapies, discharge instructions or life-style choices that may apply to you. You must talk with your health care provider for complete information about your health and treatment options. This information should not be used to decide whether or not to accept your health care provider's advice, instructions or recommendations. Only your health care provider has the knowledge and training to provide advice that is right for you. The use of this information is governed by the [employer] End User License Agreement, available at https://www.[employer].de.[employer]/en/solutions/[employer]/about/erik.The use of [employer] content is governed by the [employer] Terms of Use. ©2021 UpToDate, Inc. All rights reserved.  Copyright   © 2021 UpToDate, Inc. and/or its affiliates. All rights reserved.           "

## 2022-03-10 NOTE — TELEPHONE ENCOUNTER
----- Message from Eva Gould sent at 3/10/2022 10:25 AM CST -----  Regarding: concerns  Name of Who is Calling: Audrey           What is the request in detail: Patient is requesting a call back in regards to the symptoms she is having. Diarrhea,  nausea, vomiting and now she is weak. Her stomach is cramping as well.           Can the clinic reply by [facility]: No           [facility]: [phone]

## 2022-03-10 NOTE — PATIENT INSTRUCTIONS
General Discharge Instructions   PLEASE READ YOUR DISCHARGE INSTRUCTIONS ENTIRELY AS IT CONTAINS IMPORTANT INFORMATION.  If you were prescribed a narcotic or controlled medication, do not drive or operate heavy equipment or machinery while taking these medications.  If you were prescribed antibiotics, please take them to completion.  You must understand that you've received an Urgent Care treatment only and that you may be released before all your medical problems are known or treated. You, the patient, will arrange for follow up care as instructed.    OVER THE COUNTER RECOMMENDATIONS/SUGGESTIONS.    Make sure to stay well hydrated.    Use Nasal Saline to mechanically move any post nasal drip from your eustachian tube or from the back of your throat.    Use warm salt water gargles to ease your throat pain. Warm salt water gargles as needed for sore throat- 1/2 tsp salt to 1 cup warm water, gargle as desired.    Use an antihistamine such as Claritin, Zyrtec or Allegra to dry you out.    Use pseudoephedrine (behind the counter) to decongest. Pseudoephedrine 30 mg up to 240 mg /day. It can raise your blood pressure and give you palpitations.    Use mucinex (guaifenesin) to break up mucous up to 2400mg/day to loosen any mucous.    The mucinex DM pill has a cough suppressant that can be sedating. It can be used at night to stop the tickle at the back of your throat.    You can use Mucinex D (it has guaifenesin and a high dose of pseudoephedrine) in the mornings to help decongest.    Use Afrin in each nare for no longer than 3 days, as it is addictive. It can also dry out your mucous membranes and cause elevated blood pressure. This is especially useful if you are flying.    Use Flonase 1-2 sprays/nostril per day. It is a local acting steroid nasal spray, if you develop a bloody nose, stop using the medication immediately.    Sometimes Nyquil at night is beneficial to help you get some rest, however it is sedating and it  does have an antihistamine, and tylenol.    Honey is a natural cough suppressant that can be used.    Tylenol up to 4,000 mg a day is safe for short periods and can be used for body aches, pain, and fever. However in high doses and prolonged use it can cause liver irritation.    Ibuprofen is a non-steroidal anti-inflammatory that can be used for body aches, pain, and fever.However it can also cause stomach irritation if over used.     Follow up with your PCP or specialty clinic as instructed in the next 2-3 days if not improved or as needed. You can call [phone] to schedule an appointment with appropriate provider.      If you condition worsens, we recommend that you receive another evaluation at the emergency room immediately or contact your primary medical clinic's after hours call service to discuss your concerns.      Please return here or go to the Emergency Department for any concerns or worsening condition.   You can also call [phone] to schedule an appointment with the appropriate provider.    Please return here or go to the Emergency Department for any concerns or worsening of condition.    Thank you for choosing [facility]!    Our goal in the Urgent Care is to always provide outstanding medical care. You may receive a survey by mail or e-mail in the next week regarding your experience today. We would greatly appreciate you completing and returning the survey. Your feedback provides us with a way to recognize our staff who provide very good care, and it helps us learn how to improve when your experience was below our aspiration of excellence.      We appreciate you trusting us with your medical care. We hope you feel better soon. We will be happy to take care of you for all of your future medical needs.    Sincerely,    SHAHRZAD Sherwood  Abdominal Pain   If your condition worsens or fails to improve we recommend that you receive another evaluation at the ER immediately or contact  your PCP to discuss your concerns or return here. You must understand that you've received an urgent care treatment only and that you may be released before all your medical problems are known or treated. You the patient will arrange for followup care as instructed.   Watch for any increase pain, fever, localized pain to right lower abdomen or continued vomiting or diarrhea.   If you have diarrhea you can use Pepto Bismol; Avoid Immodium

## 2022-03-12 ENCOUNTER — OFFICE VISIT (OUTPATIENT)
Dept: URGENT CARE | Facility: CLINIC | Age: 52
End: 2022-03-12
Payer: MEDICAID

## 2022-03-12 VITALS
TEMPERATURE: 98 F | BODY MASS INDEX: 40.46 KG/M2 | HEART RATE: 90 BPM | RESPIRATION RATE: 16 BRPM | OXYGEN SATURATION: 95 % | HEIGHT: 64 IN | SYSTOLIC BLOOD PRESSURE: 115 MMHG | WEIGHT: 237 LBS | DIASTOLIC BLOOD PRESSURE: 69 MMHG

## 2022-03-12 DIAGNOSIS — K52.9 ACUTE GASTROENTERITIS: Primary | ICD-10-CM

## 2022-03-12 DIAGNOSIS — R19.7 DIARRHEA, UNSPECIFIED TYPE: ICD-10-CM

## 2022-03-12 LAB
CTP QC/QA: YES
SARS-COV-2 RDRP RESP QL NAA+PROBE: NEGATIVE

## 2022-03-12 PROCEDURE — U0002: ICD-10-PCS | Mod: QW,S$GLB,,

## 2022-03-12 PROCEDURE — 3008F BODY MASS INDEX DOCD: CPT | Mod: CPTII,S$GLB,,

## 2022-03-12 PROCEDURE — 1160F RVW MEDS BY RX/DR IN RCRD: CPT | Mod: CPTII,S$GLB,,

## 2022-03-12 PROCEDURE — 99213 OFFICE O/P EST LOW 20 MIN: CPT | Mod: S$GLB,,,

## 2022-03-12 PROCEDURE — 3078F DIAST BP <80 MM HG: CPT | Mod: CPTII,S$GLB,,

## 2022-03-12 PROCEDURE — 3074F PR MOST RECENT SYSTOLIC BLOOD PRESSURE < 130 MM HG: ICD-10-PCS | Mod: CPTII,S$GLB,,

## 2022-03-12 PROCEDURE — 3074F SYST BP LT 130 MM HG: CPT | Mod: CPTII,S$GLB,,

## 2022-03-12 PROCEDURE — 3078F PR MOST RECENT DIASTOLIC BLOOD PRESSURE < 80 MM HG: ICD-10-PCS | Mod: CPTII,S$GLB,,

## 2022-03-12 PROCEDURE — 1159F MED LIST DOCD IN RCRD: CPT | Mod: CPTII,S$GLB,,

## 2022-03-12 PROCEDURE — 4010F PR ACE/ARB THEARPY RXD/TAKEN: ICD-10-PCS | Mod: CPTII,S$GLB,,

## 2022-03-12 PROCEDURE — 3008F PR BODY MASS INDEX (BMI) DOCUMENTED: ICD-10-PCS | Mod: CPTII,S$GLB,,

## 2022-03-12 PROCEDURE — U0002 COVID-19 LAB TEST NON-CDC: HCPCS | Mod: QW,S$GLB,,

## 2022-03-12 PROCEDURE — 99213 PR OFFICE/OUTPT VISIT, EST, LEVL III, 20-29 MIN: ICD-10-PCS | Mod: S$GLB,,,

## 2022-03-12 PROCEDURE — 4010F ACE/ARB THERAPY RXD/TAKEN: CPT | Mod: CPTII,S$GLB,,

## 2022-03-12 PROCEDURE — 1159F PR MEDICATION LIST DOCUMENTED IN MEDICAL RECORD: ICD-10-PCS | Mod: CPTII,S$GLB,,

## 2022-03-12 PROCEDURE — 1160F PR REVIEW ALL MEDS BY PRESCRIBER/CLIN PHARMACIST DOCUMENTED: ICD-10-PCS | Mod: CPTII,S$GLB,,

## 2022-03-12 RX ORDER — DICYCLOMINE HYDROCHLORIDE 20 MG/1
20 TABLET ORAL EVERY 6 HOURS
Qty: 30 TABLET | Refills: 0 | Status: ON HOLD | OUTPATIENT
Start: 2022-03-12 | End: 2022-05-13 | Stop reason: HOSPADM

## 2022-03-12 NOTE — PATIENT INSTRUCTIONS
- Rest.    - Drink plenty of fluids.  - Pedialyte, Gatorade/powerade, water     - Tylenol or Ibuprofen as directed as needed for fever/pain.      - Take zofran (ondansetron) 4-8 mg every 8 hours as needed, as prescribed for nausea     - can take over-the-counter Pepto-Bismol to help with diarrhea.     -Dicyclomine is an anti-spasmodic that helps with stomach pain/cramps associated with diarrhea. It is to be taken only as needed.      - Follow up with your PCP or specialty clinic as directed in the next 2-3 days if not improved or as needed.  You can call [phone] to schedule an appointment with the appropriate provider.    - Go to the ER if you develop any new or worsening symptoms     - You must understand that you have received an Urgent Care treatment only and that you may be released before all of your medical problems are known or treated.   - You, the patient, will arrange for follow up care as instructed.   - If your condition worsens or fails to improve we recommend that you receive another evaluation at the ER immediately or contact your PCP to discuss your concerns or return here.

## 2022-03-12 NOTE — PROGRESS NOTES
"Subjective:       Patient ID: Audrey Natarajan is a 51 y.o. female.    Vitals:  height is 5' 4" (1.626 m) and weight is 107.5 kg (237 lb). Her temperature is 98.4 °F (36.9 °C). Her blood pressure is 115/69 and her pulse is 90. Her respiration is 16 and oxygen saturation is 95%.     Chief Complaint: Emesis    Pt is a 50 y/o female who presents for f/up for nausea, vomiting, diarrhea, abdominal pain. Sxs started 1 week ago. Seen here at  on 3/10/22. Has been taking zofran and immodium. Sxs started improving after her visit here. No longer nausea or vomiting. Still with some looses stools but not as watery. Pt does report having 4 BMs today. Still with some tight, crampy upper abdominal pain. Reports this morning that her stomach felt sore and it felt better after stretching and massaging it out. Has been eating crackers, peanut butter, bread and drinking water and gatorade. Able to keep it down. Denies fever, CP, SOB, bloody BMs, back or flank pain, dysuria, urgency, frequency, coughing.    Emesis   This is a new problem. The current episode started in the past 7 days. The problem occurs 2 to 4 times per day. The problem has been unchanged. The emesis has an appearance of stomach contents. There has been no fever. Associated symptoms include abdominal pain and diarrhea. Pertinent negatives include no arthralgias, chest pain, chills, coughing, decreased urine volume, dizziness, fever, headaches, myalgias, sweats, URI or weight loss. She has tried anti-emetic for the symptoms. The treatment provided no relief.       Constitution: Negative for chills and fever.   HENT: Negative for ear pain, ear discharge, congestion and sore throat.    Neck: Negative for neck pain.   Cardiovascular: Negative for chest pain.   Eyes: Negative for eye itching, eye pain and eye redness.   Respiratory: Negative for cough.    Gastrointestinal: Positive for abdominal pain, nausea (resolved), vomiting (resolved) and diarrhea. "   Genitourinary: Negative for dysuria, frequency, urgency and urine decreased.   Musculoskeletal: Negative for joint pain and muscle ache.   Skin: Negative for rash.   Neurological: Negative for dizziness, light-headedness and headaches.       Objective:      Physical Exam   Constitutional: She is oriented to person, place, and time. She appears well-developed.   HENT:   Head: Normocephalic and atraumatic.   Ears:   Right Ear: External ear normal.   Left Ear: External ear normal.   Nose: Nose normal.   Mouth/Throat: Mucous membranes are normal.   Eyes: Conjunctivae and lids are normal.   Neck: Trachea normal. Neck supple.   Cardiovascular: Normal rate, regular rhythm and normal heart sounds.   Pulmonary/Chest: Effort normal and breath sounds normal. No respiratory distress.   Abdominal: Normal appearance and bowel sounds are normal. She exhibits no distension, no abdominal bruit, no pulsatile midline mass and no mass. Soft. There is generalized abdominal tenderness. There is no rebound, no guarding, no tenderness at McBurney's point, no left CVA tenderness, negative Rovsing's sign, negative psoas sign, no right CVA tenderness and negative obturator sign.      Comments: Negative Markle test   Musculoskeletal: Normal range of motion.         General: Normal range of motion.   Neurological: She is alert and oriented to person, place, and time. She has normal strength.   Skin: Skin is warm, dry, intact, not diaphoretic and not pale.   Psychiatric: Her speech is normal and behavior is normal. Judgment and thought content normal.   Nursing note and vitals reviewed.    Results for orders placed or performed in visit on 03/12/22   POCT COVID-19 Rapid Screening   Result Value Ref Range    POC Rapid COVID Negative Negative     Acceptable Yes      *Note: Due to a large number of results and/or encounters for the requested time period, some results have not been displayed. A complete set of results can be found  in Results Review.           Assessment:       1. Acute gastroenteritis    2. Diarrhea, unspecified type          Plan:         Acute gastroenteritis  -     POCT COVID-19 Rapid Screening  -     dicyclomine (BENTYL) 20 mg tablet; Take 1 tablet (20 mg total) by mouth every 6 (six) hours.  Dispense: 30 tablet; Refill: 0    Diarrhea, unspecified type  -     Ambulatory referral/consult to Gastroenterology           Medical Decision Making:   Initial Assessment:   Pt is a 50 y/o female who presents for f/up for nausea, vomiting, diarrhea, abdominal pain. Symptoms overall improving. No longer nauseous and vomiting. Still some loose stools but not as watery as before. VSS. On exam, generalized abd tenderness. No peritoneal signs.  Differential Diagnosis:   DDx includes but not limited to viral gastroenteritis, bacterial infection, appendicitis, GERD, PUD, hepatitis  Clinical Tests:   Lab Tests: Ordered and Reviewed       <> Summary of Lab: POCT COVID negative  Urgent Care Management:  Pt with overall improving symptoms. No fever. Able to tolerate oral intake. Will prescribe bentyl for abdominal cramping and tightness. Will send referral to GI to f/up if pt continues to have persistent diarrhea. Return and ED precautions for worsening symptoms, such as worsening abdominal pain, fevers, inability to tolerate oral intake.       Patient Instructions   - Rest.    - Drink plenty of fluids.  - Pedialyte, Gatorade/powerade, water     - Tylenol or Ibuprofen as directed as needed for fever/pain.      - Take zofran (ondansetron) 4-8 mg every 8 hours as needed, as prescribed for nausea     - can take over-the-counter Pepto-Bismol to help with diarrhea.     -Dicyclomine is an anti-spasmodic that helps with stomach pain/cramps associated with diarrhea. It is to be taken only as needed.      - Follow up with your PCP or specialty clinic as directed in the next 2-3 days if not improved or as needed.  You can call [phone] to schedule  an appointment with the appropriate provider.    - Go to the ER if you develop any new or worsening symptoms     - You must understand that you have received an Urgent Care treatment only and that you may be released before all of your medical problems are known or treated.   - You, the patient, will arrange for follow up care as instructed.   - If your condition worsens or fails to improve we recommend that you receive another evaluation at the ER immediately or contact your PCP to discuss your concerns or return here.

## 2022-03-22 ENCOUNTER — TELEPHONE (OUTPATIENT)
Dept: FAMILY MEDICINE | Facility: CLINIC | Age: 52
End: 2022-03-22
Payer: MEDICAID

## 2022-03-29 ENCOUNTER — LAB VISIT (OUTPATIENT)
Dept: LAB | Facility: HOSPITAL | Age: 52
End: 2022-03-29
Attending: INTERNAL MEDICINE
Payer: MEDICAID

## 2022-03-29 DIAGNOSIS — E11.42 TYPE 2 DIABETES MELLITUS WITH DIABETIC POLYNEUROPATHY, WITHOUT LONG-TERM CURRENT USE OF INSULIN: ICD-10-CM

## 2022-03-29 LAB
ALBUMIN SERPL BCP-MCNC: 4 G/DL (ref 3.5–5.2)
ALP SERPL-CCNC: 77 U/L (ref 55–135)
ALT SERPL W/O P-5'-P-CCNC: 18 U/L (ref 10–44)
ANION GAP SERPL CALC-SCNC: 13 MMOL/L (ref 8–16)
AST SERPL-CCNC: 16 U/L (ref 10–40)
BILIRUB SERPL-MCNC: 0.4 MG/DL (ref 0.1–1)
BUN SERPL-MCNC: 4 MG/DL (ref 6–20)
CALCIUM SERPL-MCNC: 9.7 MG/DL (ref 8.7–10.5)
CHLORIDE SERPL-SCNC: 89 MMOL/L (ref 95–110)
CO2 SERPL-SCNC: 26 MMOL/L (ref 23–29)
CREAT SERPL-MCNC: 0.7 MG/DL (ref 0.5–1.4)
EST. GFR  (AFRICAN AMERICAN): >60 ML/MIN/1.73 M^2
EST. GFR  (NON AFRICAN AMERICAN): >60 ML/MIN/1.73 M^2
GLUCOSE SERPL-MCNC: 137 MG/DL (ref 70–110)
POTASSIUM SERPL-SCNC: 4.4 MMOL/L (ref 3.5–5.1)
PROT SERPL-MCNC: 7.1 G/DL (ref 6–8.4)
SODIUM SERPL-SCNC: 128 MMOL/L (ref 136–145)

## 2022-03-29 PROCEDURE — 83036 HEMOGLOBIN GLYCOSYLATED A1C: CPT | Performed by: INTERNAL MEDICINE

## 2022-03-29 PROCEDURE — 36415 COLL VENOUS BLD VENIPUNCTURE: CPT | Mod: PN | Performed by: INTERNAL MEDICINE

## 2022-03-29 PROCEDURE — 80053 COMPREHEN METABOLIC PANEL: CPT | Performed by: INTERNAL MEDICINE

## 2022-03-29 RX ORDER — TRAMADOL HYDROCHLORIDE 50 MG/1
50 TABLET ORAL EVERY 8 HOURS PRN
Qty: 28 TABLET | Refills: 0 | Status: SHIPPED | OUTPATIENT
Start: 2022-03-29 | End: 2022-04-08

## 2022-03-29 NOTE — TELEPHONE ENCOUNTER
----- Message from Kelsey Morris sent at 3/29/2022 10:10 AM CDT -----  Regarding: medication refill  Patient needs Tramadol called in because she only has three pills left and her next appt is at the end of the month.

## 2022-03-30 LAB
ESTIMATED AVG GLUCOSE: 160 MG/DL (ref 68–131)
HBA1C MFR BLD: 7.2 % (ref 4–5.6)

## 2022-04-01 RX ORDER — GABAPENTIN 300 MG/1
CAPSULE ORAL
Qty: 120 CAPSULE | Refills: 2 | Status: SHIPPED | OUTPATIENT
Start: 2022-04-01 | End: 2022-05-13

## 2022-04-01 NOTE — TELEPHONE ENCOUNTER
No new care gaps identified.  Powered by [employer]. Reference number: [id number].   3/31/2022 10:16:45 PM CDT

## 2022-04-02 DIAGNOSIS — I10 ESSENTIAL HYPERTENSION: ICD-10-CM

## 2022-04-02 DIAGNOSIS — E11.42 TYPE 2 DIABETES MELLITUS WITH DIABETIC POLYNEUROPATHY, WITHOUT LONG-TERM CURRENT USE OF INSULIN: ICD-10-CM

## 2022-04-02 NOTE — TELEPHONE ENCOUNTER
No new care gaps identified.  Powered by [employer]. Reference number: [id number].   4/02/2022 6:12:00 AM CDT

## 2022-04-04 RX ORDER — LISINOPRIL 10 MG/1
10 TABLET ORAL DAILY
Qty: 90 TABLET | Refills: 3 | Status: ON HOLD | OUTPATIENT
Start: 2022-04-04 | End: 2022-05-13 | Stop reason: HOSPADM

## 2022-04-04 NOTE — TELEPHONE ENCOUNTER
Refill Routing Note   Medication(s) are not appropriate for processing by [facility] for the following reason(s):      - Medication requested has undergone a recent dosage adjustment (<3 months)    [facility] action(s):  Defer Medication-related problems identified: Dose adjustment     Medication Therapy Plan: PHARMACY REQUEST FOR LISINOPRIL 10 MG; REQUESTING OLD DOSE TK 0.5 MG (5MG)DAILY; RECENT ENCOUNTER HAS PT TAKING 1 TAB PO DAILY; WILL PEND FOR THE NEW DOSE;  --->Care Gap information included in message below if applicable.   Medication reconciliation completed: No   Automatic Epic Generated Protocol Data:        Requested Prescriptions   Pending Prescriptions Disp Refills    lisinopriL 10 MG tablet [Pharmacy Med Name: LISINOPRIL 10MG TABLETS] 90 tablet 3     Sig: Take 1 tablet (10 mg total) by mouth once daily.       Cardiovascular:  ACE Inhibitors Failed - 4/2/2022  6:10 AM        Failed - Matches previous order       Previous Authorizing Provider: Donaldo Pena MD (lisinopriL 10 MG tablet)  Previous Pharmacy: [facility][id number] [address] [facility] [address]            Passed - Patient is at least 18 years old        Passed - Negative Pregnancy Status Check        Passed - Last BP in normal range within 360 days     BP Readings from Last 3 Encounters:   03/12/22 115/69   03/10/22 105/70   02/15/22 136/68               Passed - Valid encounter within last 15 months     Recent Visits  Date Type Provider Dept   02/03/22 Office Visit Donaldo Pena MD [facility]   09/30/21 Office Visit Donaldo Pena MD [facility]   05/27/21 Office Visit Donaldo Pena MD [facility]   02/09/21 Office Visit Donaldo Pena MD [facility]   10/26/20 Office Visit Donaldo Pena MD [facility]   07/17/20 Office Visit Donaldo Pena MD [facility]   06/16/20 Office Visit Donaldo Pena MD [facility]   Showing recent visits within past 720 days and meeting all other requirements  Future Appointments  No visits were found meeting these conditions.  Showing future appointments within next 150 days and meeting all other requirements      Future Appointments              In 3 weeks Donaldo Pena MD [place] - Family Medicine, [facility] - B    In 1 month Saloni De Anda MD [facility] [place] - Gastroenterology, [facility] Cli                Passed - No ED/Hospital visits since last PCP visit     Last PCP Visit: 2/3/2022 Last Admission: 2/13/2021 Last ED Visit: 12/30/2021          Passed - Cr is 1.39 or below and within 360 days     Lab Results   Component Value Date    CREATININE 0.7 03/29/2022    CREATININE 0.7 09/30/2021    CREATININE 0.6 04/04/2021    POCCRE 0.3 (L) 12/30/2021    POCCRE 0.5 (L) 07/13/2021    POCCRE 0.5 02/10/2021              Passed - K is 5.2 or below and within 360 days     POC Potassium   Date Value Ref Range Status   12/30/2021 4.4 3.6 - 5.1 mmol/L Final   07/13/2021 4.0 3.6 - 5.1 mmol/L Final   02/10/2021 3.6 3.5 - 5.1 mmol/L Final   10/10/2019 4.4 3.5 - 4.9 MMOL/L Final     Potassium   Date Value Ref Range Status   03/29/2022 4.4 3.5 - 5.1 mmol/L Final   09/30/2021 4.8 3.5 - 5.1 mmol/L Final   04/04/2021 4.2 3.5 - 5.1 mmol/L Final              Passed - eGFR within 360 days     Lab Results   Component Value Date    EGFRNONAA >60 03/29/2022    EGFRNONAA >60 09/30/2021    EGFRNONAA >60.0 04/04/2021                      Appointments  past 12m or future 3m with PCP    Date Provider   Last Visit   2/3/2022 Donaldo Pena MD   Next Visit   4/28/2022 Donaldo Pena MD   ED visits in past 90 days: 0        Note composed:11:52 AM 04/04/2022

## 2022-04-07 ENCOUNTER — HOSPITAL ENCOUNTER (EMERGENCY)
Facility: HOSPITAL | Age: 52
Discharge: PSYCHIATRIC OR SUBSTANCE USE FACILITY | End: 2022-04-08
Attending: EMERGENCY MEDICINE
Payer: MEDICAID

## 2022-04-07 DIAGNOSIS — Z00.8 MEDICAL CLEARANCE FOR PSYCHIATRIC ADMISSION: ICD-10-CM

## 2022-04-07 DIAGNOSIS — F29 PSYCHOSIS: ICD-10-CM

## 2022-04-07 LAB
ALBUMIN SERPL BCP-MCNC: 4 G/DL (ref 3.5–5.2)
ALP SERPL-CCNC: 86 U/L (ref 55–135)
ALT SERPL W/O P-5'-P-CCNC: 19 U/L (ref 10–44)
AMPHET+METHAMPHET UR QL: NEGATIVE
ANION GAP SERPL CALC-SCNC: 10 MMOL/L (ref 8–16)
APAP SERPL-MCNC: <3 UG/ML (ref 10–20)
AST SERPL-CCNC: 24 U/L (ref 10–40)
BARBITURATES UR QL SCN>200 NG/ML: NEGATIVE
BASOPHILS # BLD AUTO: ABNORMAL K/UL (ref 0–0.2)
BASOPHILS NFR BLD: 0 % (ref 0–1.9)
BENZODIAZ UR QL SCN>200 NG/ML: ABNORMAL
BILIRUB SERPL-MCNC: 0.4 MG/DL (ref 0.1–1)
BILIRUB UR QL STRIP: NEGATIVE
BUN SERPL-MCNC: 9 MG/DL (ref 6–20)
BZE UR QL SCN: NEGATIVE
CALCIUM SERPL-MCNC: 9.6 MG/DL (ref 8.7–10.5)
CANNABINOIDS UR QL SCN: NEGATIVE
CHLORIDE SERPL-SCNC: 93 MMOL/L (ref 95–110)
CK SERPL-CCNC: 296 U/L (ref 20–180)
CLARITY UR: CLEAR
CO2 SERPL-SCNC: 26 MMOL/L (ref 23–29)
COLOR UR: YELLOW
CREAT SERPL-MCNC: 0.6 MG/DL (ref 0.5–1.4)
CREAT UR-MCNC: 31.7 MG/DL (ref 15–325)
CTP QC/QA: YES
DIFFERENTIAL METHOD: ABNORMAL
EOSINOPHIL # BLD AUTO: ABNORMAL K/UL (ref 0–0.5)
EOSINOPHIL NFR BLD: 0 % (ref 0–8)
ERYTHROCYTE [DISTWIDTH] IN BLOOD BY AUTOMATED COUNT: 14.7 % (ref 11.5–14.5)
EST. GFR  (AFRICAN AMERICAN): >60 ML/MIN/1.73 M^2
EST. GFR  (NON AFRICAN AMERICAN): >60 ML/MIN/1.73 M^2
ETHANOL SERPL-MCNC: <10 MG/DL
GLUCOSE SERPL-MCNC: 138 MG/DL (ref 70–110)
GLUCOSE UR QL STRIP: NEGATIVE
HCT VFR BLD AUTO: 38.5 % (ref 37–48.5)
HGB BLD-MCNC: 13 G/DL (ref 12–16)
HGB UR QL STRIP: NEGATIVE
IMM GRANULOCYTES # BLD AUTO: ABNORMAL K/UL (ref 0–0.04)
IMM GRANULOCYTES NFR BLD AUTO: ABNORMAL % (ref 0–0.5)
KETONES UR QL STRIP: ABNORMAL
LEUKOCYTE ESTERASE UR QL STRIP: NEGATIVE
LYMPHOCYTES # BLD AUTO: ABNORMAL K/UL (ref 1–4.8)
LYMPHOCYTES NFR BLD: 29 % (ref 18–48)
MCH RBC QN AUTO: 28.1 PG (ref 27–31)
MCHC RBC AUTO-ENTMCNC: 33.8 G/DL (ref 32–36)
MCV RBC AUTO: 83 FL (ref 82–98)
METHADONE UR QL SCN>300 NG/ML: NEGATIVE
MONOCYTES # BLD AUTO: ABNORMAL K/UL (ref 0.3–1)
MONOCYTES NFR BLD: 9 % (ref 4–15)
NEUTROPHILS NFR BLD: 62 % (ref 38–73)
NITRITE UR QL STRIP: NEGATIVE
NRBC BLD-RTO: 0 /100 WBC
OPIATES UR QL SCN: NEGATIVE
PCP UR QL SCN>25 NG/ML: NEGATIVE
PH UR STRIP: 7 [PH] (ref 5–8)
PLATELET # BLD AUTO: 489 K/UL (ref 150–450)
PMV BLD AUTO: 9.6 FL (ref 9.2–12.9)
POTASSIUM SERPL-SCNC: 3.9 MMOL/L (ref 3.5–5.1)
PROT SERPL-MCNC: 7.2 G/DL (ref 6–8.4)
PROT UR QL STRIP: NEGATIVE
RBC # BLD AUTO: 4.63 M/UL (ref 4–5.4)
SARS-COV-2 RDRP RESP QL NAA+PROBE: NEGATIVE
SODIUM SERPL-SCNC: 129 MMOL/L (ref 136–145)
SP GR UR STRIP: 1 (ref 1–1.03)
TOXICOLOGY INFORMATION: ABNORMAL
URN SPEC COLLECT METH UR: ABNORMAL
UROBILINOGEN UR STRIP-ACNC: NEGATIVE EU/DL
WBC # BLD AUTO: 17.11 K/UL (ref 3.9–12.7)

## 2022-04-07 PROCEDURE — 96372 THER/PROPH/DIAG INJ SC/IM: CPT | Performed by: EMERGENCY MEDICINE

## 2022-04-07 PROCEDURE — 80143 DRUG ASSAY ACETAMINOPHEN: CPT | Performed by: EMERGENCY MEDICINE

## 2022-04-07 PROCEDURE — 81003 URINALYSIS AUTO W/O SCOPE: CPT | Mod: 59 | Performed by: EMERGENCY MEDICINE

## 2022-04-07 PROCEDURE — 93005 ELECTROCARDIOGRAM TRACING: CPT

## 2022-04-07 PROCEDURE — U0002 COVID-19 LAB TEST NON-CDC: HCPCS | Performed by: EMERGENCY MEDICINE

## 2022-04-07 PROCEDURE — S0166 INJ OLANZAPINE 2.5MG: HCPCS | Performed by: EMERGENCY MEDICINE

## 2022-04-07 PROCEDURE — 99285 EMERGENCY DEPT VISIT HI MDM: CPT | Mod: 25

## 2022-04-07 PROCEDURE — 63600175 PHARM REV CODE 636 W HCPCS: Performed by: EMERGENCY MEDICINE

## 2022-04-07 PROCEDURE — 93010 EKG 12-LEAD: ICD-10-PCS | Mod: ,,, | Performed by: INTERNAL MEDICINE

## 2022-04-07 PROCEDURE — 84443 ASSAY THYROID STIM HORMONE: CPT | Performed by: EMERGENCY MEDICINE

## 2022-04-07 PROCEDURE — 80053 COMPREHEN METABOLIC PANEL: CPT | Performed by: EMERGENCY MEDICINE

## 2022-04-07 PROCEDURE — 99215 PR OFFICE/OUTPT VISIT, EST, LEVL V, 40-54 MIN: ICD-10-PCS | Mod: 95,AF,HB, | Performed by: PSYCHIATRY & NEUROLOGY

## 2022-04-07 PROCEDURE — 25000003 PHARM REV CODE 250: Performed by: EMERGENCY MEDICINE

## 2022-04-07 PROCEDURE — 82077 ASSAY SPEC XCP UR&BREATH IA: CPT | Performed by: EMERGENCY MEDICINE

## 2022-04-07 PROCEDURE — 93010 ELECTROCARDIOGRAM REPORT: CPT | Mod: ,,, | Performed by: INTERNAL MEDICINE

## 2022-04-07 PROCEDURE — 85025 COMPLETE CBC W/AUTO DIFF WBC: CPT | Performed by: EMERGENCY MEDICINE

## 2022-04-07 PROCEDURE — 96360 HYDRATION IV INFUSION INIT: CPT

## 2022-04-07 PROCEDURE — 99215 OFFICE O/P EST HI 40 MIN: CPT | Mod: 95,AF,HB, | Performed by: PSYCHIATRY & NEUROLOGY

## 2022-04-07 PROCEDURE — 82550 ASSAY OF CK (CPK): CPT | Performed by: EMERGENCY MEDICINE

## 2022-04-07 PROCEDURE — 80307 DRUG TEST PRSMV CHEM ANLYZR: CPT | Performed by: EMERGENCY MEDICINE

## 2022-04-07 RX ORDER — LABETALOL HYDROCHLORIDE 5 MG/ML
10 INJECTION, SOLUTION INTRAVENOUS
Status: DISCONTINUED | OUTPATIENT
Start: 2022-04-08 | End: 2022-04-08 | Stop reason: HOSPADM

## 2022-04-07 RX ORDER — OLANZAPINE 10 MG/2ML
10 INJECTION, POWDER, FOR SOLUTION INTRAMUSCULAR
Status: COMPLETED | OUTPATIENT
Start: 2022-04-07 | End: 2022-04-07

## 2022-04-07 RX ADMIN — SODIUM CHLORIDE, SODIUM LACTATE, POTASSIUM CHLORIDE, AND CALCIUM CHLORIDE 1000 ML: .6; .31; .03; .02 INJECTION, SOLUTION INTRAVENOUS at 09:04

## 2022-04-07 RX ADMIN — OLANZAPINE 10 MG: 10 INJECTION, POWDER, LYOPHILIZED, FOR SOLUTION INTRAMUSCULAR at 07:04
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
## 2022-04-08 VITALS
TEMPERATURE: 98 F | RESPIRATION RATE: 18 BRPM | OXYGEN SATURATION: 98 % | SYSTOLIC BLOOD PRESSURE: 127 MMHG | BODY MASS INDEX: 39.96 KG/M2 | WEIGHT: 232.81 LBS | HEART RATE: 95 BPM | DIASTOLIC BLOOD PRESSURE: 62 MMHG

## 2022-04-08 LAB — TSH SERPL DL<=0.005 MIU/L-ACNC: 1.81 UIU/ML (ref 0.4–4)

## 2022-04-08 RX ORDER — ACETAMINOPHEN 500 MG
1000 TABLET ORAL
Status: DISCONTINUED | OUTPATIENT
Start: 2022-04-08 | End: 2022-04-08 | Stop reason: HOSPADM

## 2022-04-08 NOTE — ED TRIAGE NOTES
Patient arrived to ER via EMS, naked from pulling off all clothes as she arrived. Report received from EMS staff: they were called to her trailer by family members who were concerned re mental status change over last 3/4 days and today presents w bibi. Was medicated w 2.5 mg Versed by EMS.  Patient w PMH of schizophrenia and poor med compliance. Patient literally rolled in the unit at 1855 so minimal information obtained and night RN will assume care.

## 2022-04-08 NOTE — ED NOTES
Pt ambulated to restroom with assistance, urine sample obtained. Pt back into bed with PEC gown reapplied. IVF infusing.
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
## 2022-04-08 NOTE — ED NOTES
Patients' sister, Anitra, notified of placement. Asked to be notified when pt is transported to [facility]. [phone]

## 2022-04-12 ENCOUNTER — HOSPITAL ENCOUNTER (INPATIENT)
Facility: HOSPITAL | Age: 52
LOS: 14 days | Discharge: HOME | DRG: 854 | End: 2022-04-26
Attending: EMERGENCY MEDICINE | Admitting: HOSPITALIST
Payer: MEDICAID

## 2022-04-12 DIAGNOSIS — M79.671 BILATERAL LEG AND FOOT PAIN: ICD-10-CM

## 2022-04-12 DIAGNOSIS — M79.604 BILATERAL LEG AND FOOT PAIN: ICD-10-CM

## 2022-04-12 DIAGNOSIS — M79.605 BILATERAL LEG AND FOOT PAIN: ICD-10-CM

## 2022-04-12 DIAGNOSIS — L97.502 DIABETIC ULCER OF OTHER PART OF FOOT ASSOCIATED WITH TYPE 2 DIABETES MELLITUS, WITH FAT LAYER EXPOSED, UNSPECIFIED LATERALITY: ICD-10-CM

## 2022-04-12 DIAGNOSIS — R07.9 CHEST PAIN: ICD-10-CM

## 2022-04-12 DIAGNOSIS — R00.0 TACHYCARDIA: ICD-10-CM

## 2022-04-12 DIAGNOSIS — Z86.31 HISTORY OF DIABETIC ULCER OF FOOT: ICD-10-CM

## 2022-04-12 DIAGNOSIS — M79.3 PANNICULITIS: ICD-10-CM

## 2022-04-12 DIAGNOSIS — E11.621 DIABETIC ULCER OF OTHER PART OF FOOT ASSOCIATED WITH TYPE 2 DIABETES MELLITUS, WITH FAT LAYER EXPOSED, UNSPECIFIED LATERALITY: ICD-10-CM

## 2022-04-12 DIAGNOSIS — R60.0 BILATERAL LOWER EXTREMITY EDEMA: ICD-10-CM

## 2022-04-12 DIAGNOSIS — M79.605 BILATERAL LEG PAIN: ICD-10-CM

## 2022-04-12 DIAGNOSIS — E11.42 TYPE 2 DIABETES MELLITUS WITH DIABETIC POLYNEUROPATHY, WITHOUT LONG-TERM CURRENT USE OF INSULIN: ICD-10-CM

## 2022-04-12 DIAGNOSIS — M79.672 BILATERAL LEG AND FOOT PAIN: ICD-10-CM

## 2022-04-12 DIAGNOSIS — L03.116 CELLULITIS OF LEFT LOWER EXTREMITY: Primary | ICD-10-CM

## 2022-04-12 DIAGNOSIS — M79.604 BILATERAL LEG PAIN: ICD-10-CM

## 2022-04-12 DIAGNOSIS — R52 GENERALIZED BODY ACHES: ICD-10-CM

## 2022-04-12 PROCEDURE — 96365 THER/PROPH/DIAG IV INF INIT: CPT

## 2022-04-12 PROCEDURE — 99285 EMERGENCY DEPT VISIT HI MDM: CPT | Mod: 25

## 2022-04-12 PROCEDURE — 12000002 HC ACUTE/MED SURGE SEMI-PRIVATE ROOM

## 2022-04-13 ENCOUNTER — CLINICAL SUPPORT (OUTPATIENT)
Dept: SMOKING CESSATION | Facility: CLINIC | Age: 52
End: 2022-04-13
Payer: COMMERCIAL

## 2022-04-13 DIAGNOSIS — F17.210 CIGARETTE SMOKER: Primary | ICD-10-CM

## 2022-04-13 PROBLEM — D64.9 ANEMIA: Status: ACTIVE | Noted: 2022-04-13

## 2022-04-13 PROBLEM — E11.621 DIABETIC FOOT ULCER ASSOCIATED WITH TYPE 2 DIABETES MELLITUS: Status: ACTIVE | Noted: 2022-04-13

## 2022-04-13 PROBLEM — L97.509 DIABETIC FOOT ULCER ASSOCIATED WITH TYPE 2 DIABETES MELLITUS: Status: ACTIVE | Noted: 2022-04-13

## 2022-04-13 PROBLEM — I82.403 ACUTE DEEP VEIN THROMBOSIS (DVT) OF BOTH LOWER EXTREMITIES: Status: ACTIVE | Noted: 2022-04-13

## 2022-04-13 PROBLEM — L03.119 CELLULITIS OF LOWER EXTREMITY: Status: ACTIVE | Noted: 2021-02-10

## 2022-04-13 LAB
ALBUMIN SERPL BCP-MCNC: 3.1 G/DL (ref 3.5–5.2)
ALP SERPL-CCNC: 75 U/L (ref 55–135)
ALT SERPL W/O P-5'-P-CCNC: 28 U/L (ref 10–44)
ANION GAP SERPL CALC-SCNC: 14 MMOL/L (ref 8–16)
ANISOCYTOSIS BLD QL SMEAR: SLIGHT
AST SERPL-CCNC: 16 U/L (ref 10–40)
BASOPHILS # BLD AUTO: 0.12 K/UL (ref 0–0.2)
BASOPHILS NFR BLD: 0.4 % (ref 0–1.9)
BILIRUB SERPL-MCNC: 0.3 MG/DL (ref 0.1–1)
BILIRUB UR QL STRIP: NEGATIVE
BUN SERPL-MCNC: 15 MG/DL (ref 6–20)
BURR CELLS BLD QL SMEAR: ABNORMAL
CALCIUM SERPL-MCNC: 9.2 MG/DL (ref 8.7–10.5)
CHLORIDE SERPL-SCNC: 99 MMOL/L (ref 95–110)
CK SERPL-CCNC: 43 U/L (ref 20–180)
CLARITY UR: CLEAR
CO2 SERPL-SCNC: 24 MMOL/L (ref 23–29)
COLOR UR: YELLOW
CREAT SERPL-MCNC: 0.6 MG/DL (ref 0.5–1.4)
DACRYOCYTES BLD QL SMEAR: ABNORMAL
DIFFERENTIAL METHOD: ABNORMAL
EOSINOPHIL # BLD AUTO: 0.2 K/UL (ref 0–0.5)
EOSINOPHIL NFR BLD: 0.7 % (ref 0–8)
ERYTHROCYTE [DISTWIDTH] IN BLOOD BY AUTOMATED COUNT: 15.4 % (ref 11.5–14.5)
EST. GFR  (AFRICAN AMERICAN): >60 ML/MIN/1.73 M^2
EST. GFR  (NON AFRICAN AMERICAN): >60 ML/MIN/1.73 M^2
ESTIMATED AVG GLUCOSE: 154 MG/DL (ref 68–131)
FERRITIN SERPL-MCNC: 84 NG/ML (ref 20–300)
GIANT PLATELETS BLD QL SMEAR: PRESENT
GLUCOSE SERPL-MCNC: 139 MG/DL (ref 70–110)
GLUCOSE UR QL STRIP: ABNORMAL
HBA1C MFR BLD: 7 % (ref 4–5.6)
HCT VFR BLD AUTO: 30.5 % (ref 37–48.5)
HGB BLD-MCNC: 9.9 G/DL (ref 12–16)
HGB UR QL STRIP: NEGATIVE
HYPOCHROMIA BLD QL SMEAR: ABNORMAL
IMM GRANULOCYTES # BLD AUTO: 0.33 K/UL (ref 0–0.04)
IMM GRANULOCYTES NFR BLD AUTO: 1.1 % (ref 0–0.5)
INR PPP: 1 (ref 0.8–1.2)
IRON SERPL-MCNC: 13 UG/DL (ref 30–160)
KETONES UR QL STRIP: NEGATIVE
LACTATE SERPL-SCNC: 1.6 MMOL/L (ref 0.5–2.2)
LEUKOCYTE ESTERASE UR QL STRIP: NEGATIVE
LYMPHOCYTES # BLD AUTO: 4.4 K/UL (ref 1–4.8)
LYMPHOCYTES NFR BLD: 14.2 % (ref 18–48)
MAGNESIUM SERPL-MCNC: 1.4 MG/DL (ref 1.6–2.6)
MCH RBC QN AUTO: 27.8 PG (ref 27–31)
MCHC RBC AUTO-ENTMCNC: 32.5 G/DL (ref 32–36)
MCV RBC AUTO: 86 FL (ref 82–98)
MONOCYTES # BLD AUTO: 2.7 K/UL (ref 0.3–1)
MONOCYTES NFR BLD: 8.7 % (ref 4–15)
NEUTROPHILS # BLD AUTO: 23.4 K/UL (ref 1.8–7.7)
NEUTROPHILS NFR BLD: 74.9 % (ref 38–73)
NITRITE UR QL STRIP: NEGATIVE
NRBC BLD-RTO: 0 /100 WBC
OVALOCYTES BLD QL SMEAR: ABNORMAL
PH UR STRIP: 6 [PH] (ref 5–8)
PHOSPHATE SERPL-MCNC: 4.4 MG/DL (ref 2.7–4.5)
PLATELET # BLD AUTO: 473 K/UL (ref 150–450)
PLATELET BLD QL SMEAR: ABNORMAL
PMV BLD AUTO: 9.7 FL (ref 9.2–12.9)
POCT GLUCOSE: 160 MG/DL (ref 70–110)
POIKILOCYTOSIS BLD QL SMEAR: SLIGHT
POLYCHROMASIA BLD QL SMEAR: ABNORMAL
POTASSIUM SERPL-SCNC: 4.6 MMOL/L (ref 3.5–5.1)
PROCALCITONIN SERPL IA-MCNC: 0.13 NG/ML
PROT SERPL-MCNC: 5.8 G/DL (ref 6–8.4)
PROT UR QL STRIP: NEGATIVE
PROTHROMBIN TIME: 9.9 SEC (ref 9–12.5)
RBC # BLD AUTO: 3.56 M/UL (ref 4–5.4)
SATURATED IRON: 3 % (ref 20–50)
SODIUM SERPL-SCNC: 137 MMOL/L (ref 136–145)
SP GR UR STRIP: 1.01 (ref 1–1.03)
TARGETS BLD QL SMEAR: ABNORMAL
TOTAL IRON BINDING CAPACITY: 456 UG/DL (ref 250–450)
TRANSFERRIN SERPL-MCNC: 308 MG/DL (ref 200–375)
URN SPEC COLLECT METH UR: ABNORMAL
UROBILINOGEN UR STRIP-ACNC: NEGATIVE EU/DL
WBC # BLD AUTO: 31.22 K/UL (ref 3.9–12.7)

## 2022-04-13 PROCEDURE — 25000003 PHARM REV CODE 250: Performed by: EMERGENCY MEDICINE

## 2022-04-13 PROCEDURE — 63600175 PHARM REV CODE 636 W HCPCS: Performed by: EMERGENCY MEDICINE

## 2022-04-13 PROCEDURE — 83036 HEMOGLOBIN GLYCOSYLATED A1C: CPT | Performed by: EMERGENCY MEDICINE

## 2022-04-13 PROCEDURE — 99407 PR TOBACCO USE CESSATION INTENSIVE >10 MINUTES: ICD-10-PCS | Mod: S$GLB,,,

## 2022-04-13 PROCEDURE — 85610 PROTHROMBIN TIME: CPT | Performed by: EMERGENCY MEDICINE

## 2022-04-13 PROCEDURE — 25000003 PHARM REV CODE 250: Performed by: NURSE PRACTITIONER

## 2022-04-13 PROCEDURE — 99220 PR INITIAL OBSERVATION CARE,LEVL III: CPT | Mod: AF,HB,, | Performed by: PSYCHIATRY & NEUROLOGY

## 2022-04-13 PROCEDURE — 99214 OFFICE O/P EST MOD 30 MIN: CPT | Mod: 25,,, | Performed by: STUDENT IN AN ORGANIZED HEALTH CARE EDUCATION/TRAINING PROGRAM

## 2022-04-13 PROCEDURE — 99220 PR INITIAL OBSERVATION CARE,LEVL III: ICD-10-PCS | Mod: AF,HB,, | Performed by: PSYCHIATRY & NEUROLOGY

## 2022-04-13 PROCEDURE — 99999 PR PBB SHADOW E&M-EST. PATIENT-LVL I: ICD-10-PCS | Mod: PBBFAC,,,

## 2022-04-13 PROCEDURE — 99214 PR OFFICE/OUTPT VISIT, EST, LEVL IV, 30-39 MIN: ICD-10-PCS | Mod: 25,,, | Performed by: STUDENT IN AN ORGANIZED HEALTH CARE EDUCATION/TRAINING PROGRAM

## 2022-04-13 PROCEDURE — 81003 URINALYSIS AUTO W/O SCOPE: CPT | Performed by: EMERGENCY MEDICINE

## 2022-04-13 PROCEDURE — 99407 BEHAV CHNG SMOKING > 10 MIN: CPT | Mod: S$GLB,,,

## 2022-04-13 PROCEDURE — S4991 NICOTINE PATCH NONLEGEND: HCPCS | Performed by: HOSPITALIST

## 2022-04-13 PROCEDURE — 87075 CULTR BACTERIA EXCEPT BLOOD: CPT | Performed by: STUDENT IN AN ORGANIZED HEALTH CARE EDUCATION/TRAINING PROGRAM

## 2022-04-13 PROCEDURE — 90833 PSYTX W PT W E/M 30 MIN: CPT | Mod: AF,HB,, | Performed by: PSYCHIATRY & NEUROLOGY

## 2022-04-13 PROCEDURE — 99999 PR PBB SHADOW E&M-EST. PATIENT-LVL I: CPT | Mod: PBBFAC,,,

## 2022-04-13 PROCEDURE — 11042 DEBRIDEMENT: ICD-10-PCS | Mod: ,,, | Performed by: STUDENT IN AN ORGANIZED HEALTH CARE EDUCATION/TRAINING PROGRAM

## 2022-04-13 PROCEDURE — 83735 ASSAY OF MAGNESIUM: CPT | Performed by: EMERGENCY MEDICINE

## 2022-04-13 PROCEDURE — 25000003 PHARM REV CODE 250: Performed by: HOSPITALIST

## 2022-04-13 PROCEDURE — G0378 HOSPITAL OBSERVATION PER HR: HCPCS

## 2022-04-13 PROCEDURE — 85025 COMPLETE CBC W/AUTO DIFF WBC: CPT | Performed by: EMERGENCY MEDICINE

## 2022-04-13 PROCEDURE — 11045 DBRDMT SUBQ TISS EACH ADDL: CPT | Mod: ,,, | Performed by: STUDENT IN AN ORGANIZED HEALTH CARE EDUCATION/TRAINING PROGRAM

## 2022-04-13 PROCEDURE — 11000001 HC ACUTE MED/SURG PRIVATE ROOM

## 2022-04-13 PROCEDURE — 25000003 PHARM REV CODE 250: Performed by: PHYSICIAN ASSISTANT

## 2022-04-13 PROCEDURE — 87076 CULTURE ANAEROBE IDENT EACH: CPT | Performed by: STUDENT IN AN ORGANIZED HEALTH CARE EDUCATION/TRAINING PROGRAM

## 2022-04-13 PROCEDURE — 94761 N-INVAS EAR/PLS OXIMETRY MLT: CPT

## 2022-04-13 PROCEDURE — 87040 BLOOD CULTURE FOR BACTERIA: CPT | Performed by: EMERGENCY MEDICINE

## 2022-04-13 PROCEDURE — 25000003 PHARM REV CODE 250: Performed by: PSYCHIATRY & NEUROLOGY

## 2022-04-13 PROCEDURE — 87186 SC STD MICRODIL/AGAR DIL: CPT | Performed by: STUDENT IN AN ORGANIZED HEALTH CARE EDUCATION/TRAINING PROGRAM

## 2022-04-13 PROCEDURE — 84100 ASSAY OF PHOSPHORUS: CPT | Performed by: EMERGENCY MEDICINE

## 2022-04-13 PROCEDURE — 96372 THER/PROPH/DIAG INJ SC/IM: CPT | Performed by: NURSE PRACTITIONER

## 2022-04-13 PROCEDURE — 80053 COMPREHEN METABOLIC PANEL: CPT | Performed by: EMERGENCY MEDICINE

## 2022-04-13 PROCEDURE — 84145 PROCALCITONIN (PCT): CPT | Performed by: EMERGENCY MEDICINE

## 2022-04-13 PROCEDURE — 94640 AIRWAY INHALATION TREATMENT: CPT

## 2022-04-13 PROCEDURE — 82550 ASSAY OF CK (CPK): CPT | Performed by: EMERGENCY MEDICINE

## 2022-04-13 PROCEDURE — 90833 PR PSYCHOTHERAPY W/PATIENT W/E&M, 30 MIN (ADD ON): ICD-10-PCS | Mod: AF,HB,, | Performed by: PSYCHIATRY & NEUROLOGY

## 2022-04-13 PROCEDURE — 11042 DBRDMT SUBQ TIS 1ST 20SQCM/<: CPT | Mod: ,,, | Performed by: STUDENT IN AN ORGANIZED HEALTH CARE EDUCATION/TRAINING PROGRAM

## 2022-04-13 PROCEDURE — 63600175 PHARM REV CODE 636 W HCPCS: Performed by: NURSE PRACTITIONER

## 2022-04-13 PROCEDURE — 84466 ASSAY OF TRANSFERRIN: CPT | Performed by: NURSE PRACTITIONER

## 2022-04-13 PROCEDURE — 82728 ASSAY OF FERRITIN: CPT | Performed by: NURSE PRACTITIONER

## 2022-04-13 PROCEDURE — 83605 ASSAY OF LACTIC ACID: CPT | Performed by: EMERGENCY MEDICINE

## 2022-04-13 PROCEDURE — 11045 DEBRIDEMENT: ICD-10-PCS | Mod: ,,, | Performed by: STUDENT IN AN ORGANIZED HEALTH CARE EDUCATION/TRAINING PROGRAM

## 2022-04-13 PROCEDURE — 25000242 PHARM REV CODE 250 ALT 637 W/ HCPCS: Performed by: NURSE PRACTITIONER

## 2022-04-13 PROCEDURE — 87070 CULTURE OTHR SPECIMN AEROBIC: CPT | Performed by: STUDENT IN AN ORGANIZED HEALTH CARE EDUCATION/TRAINING PROGRAM

## 2022-04-13 PROCEDURE — 87077 CULTURE AEROBIC IDENTIFY: CPT | Performed by: STUDENT IN AN ORGANIZED HEALTH CARE EDUCATION/TRAINING PROGRAM

## 2022-04-13 RX ORDER — ENOXAPARIN SODIUM 100 MG/ML
1 INJECTION SUBCUTANEOUS
Status: DISCONTINUED | OUTPATIENT
Start: 2022-04-13 | End: 2022-04-14

## 2022-04-13 RX ORDER — HYDROXYZINE HYDROCHLORIDE 50 MG/1
50 TABLET, FILM COATED ORAL 4 TIMES DAILY PRN
Status: ON HOLD | COMMUNITY
Start: 2022-03-17 | End: 2022-05-13 | Stop reason: HOSPADM

## 2022-04-13 RX ORDER — FAMOTIDINE 20 MG/1
20 TABLET, FILM COATED ORAL 2 TIMES DAILY
Status: ON HOLD | COMMUNITY
End: 2022-05-13 | Stop reason: HOSPADM

## 2022-04-13 RX ORDER — INSULIN ASPART 100 [IU]/ML
0-5 INJECTION, SOLUTION INTRAVENOUS; SUBCUTANEOUS
Status: DISCONTINUED | OUTPATIENT
Start: 2022-04-13 | End: 2022-04-14

## 2022-04-13 RX ORDER — DIVALPROEX SODIUM 250 MG/1
500 TABLET, DELAYED RELEASE ORAL DAILY
Status: DISCONTINUED | OUTPATIENT
Start: 2022-04-14 | End: 2022-04-26 | Stop reason: HOSPADM

## 2022-04-13 RX ORDER — DIVALPROEX SODIUM 250 MG/1
250 TABLET, DELAYED RELEASE ORAL NIGHTLY
Status: DISCONTINUED | OUTPATIENT
Start: 2022-04-13 | End: 2022-04-13

## 2022-04-13 RX ORDER — NAPROXEN SODIUM 220 MG/1
81 TABLET, FILM COATED ORAL DAILY
Status: DISCONTINUED | OUTPATIENT
Start: 2022-04-13 | End: 2022-04-26 | Stop reason: HOSPADM

## 2022-04-13 RX ORDER — DIVALPROEX SODIUM 250 MG/1
1000 TABLET, DELAYED RELEASE ORAL NIGHTLY
Status: DISCONTINUED | OUTPATIENT
Start: 2022-04-13 | End: 2022-04-19

## 2022-04-13 RX ORDER — TALC
6 POWDER (GRAM) TOPICAL NIGHTLY PRN
Status: DISCONTINUED | OUTPATIENT
Start: 2022-04-13 | End: 2022-04-26 | Stop reason: HOSPADM

## 2022-04-13 RX ORDER — FLUTICASONE FUROATE AND VILANTEROL 100; 25 UG/1; UG/1
1 POWDER RESPIRATORY (INHALATION) DAILY
Refills: 2 | Status: DISCONTINUED | OUTPATIENT
Start: 2022-04-13 | End: 2022-04-26 | Stop reason: HOSPADM

## 2022-04-13 RX ORDER — ONDANSETRON 2 MG/ML
4 INJECTION INTRAMUSCULAR; INTRAVENOUS EVERY 8 HOURS PRN
Status: DISCONTINUED | OUTPATIENT
Start: 2022-04-13 | End: 2022-04-26 | Stop reason: HOSPADM

## 2022-04-13 RX ORDER — TRAMADOL HYDROCHLORIDE 50 MG/1
50 TABLET ORAL ONCE
Status: COMPLETED | OUTPATIENT
Start: 2022-04-13 | End: 2022-04-13

## 2022-04-13 RX ORDER — ACETAMINOPHEN 325 MG/1
650 TABLET ORAL EVERY 4 HOURS PRN
Status: DISCONTINUED | OUTPATIENT
Start: 2022-04-13 | End: 2022-04-26 | Stop reason: HOSPADM

## 2022-04-13 RX ORDER — GABAPENTIN 300 MG/1
300 CAPSULE ORAL 2 TIMES DAILY
Status: DISCONTINUED | OUTPATIENT
Start: 2022-04-13 | End: 2022-04-26 | Stop reason: HOSPADM

## 2022-04-13 RX ORDER — FUROSEMIDE 20 MG/1
20 TABLET ORAL DAILY
Status: DISCONTINUED | OUTPATIENT
Start: 2022-04-13 | End: 2022-04-20

## 2022-04-13 RX ORDER — PRAVASTATIN SODIUM 40 MG/1
40 TABLET ORAL NIGHTLY
Status: DISCONTINUED | OUTPATIENT
Start: 2022-04-13 | End: 2022-04-26 | Stop reason: HOSPADM

## 2022-04-13 RX ORDER — FAMOTIDINE 20 MG/1
20 TABLET, FILM COATED ORAL 2 TIMES DAILY
Status: DISCONTINUED | OUTPATIENT
Start: 2022-04-13 | End: 2022-04-20

## 2022-04-13 RX ORDER — GLUCAGON 1 MG
1 KIT INJECTION
Status: DISCONTINUED | OUTPATIENT
Start: 2022-04-13 | End: 2022-04-26 | Stop reason: HOSPADM

## 2022-04-13 RX ORDER — IBUPROFEN 200 MG
16 TABLET ORAL
Status: DISCONTINUED | OUTPATIENT
Start: 2022-04-13 | End: 2022-04-26 | Stop reason: HOSPADM

## 2022-04-13 RX ORDER — SODIUM CHLORIDE 0.9 % (FLUSH) 0.9 %
10 SYRINGE (ML) INJECTION EVERY 12 HOURS PRN
Status: DISCONTINUED | OUTPATIENT
Start: 2022-04-13 | End: 2022-04-26 | Stop reason: HOSPADM

## 2022-04-13 RX ORDER — CLINDAMYCIN PHOSPHATE 600 MG/50ML
600 INJECTION, SOLUTION INTRAVENOUS
Status: DISCONTINUED | OUTPATIENT
Start: 2022-04-13 | End: 2022-04-18

## 2022-04-13 RX ORDER — IBUPROFEN 200 MG
24 TABLET ORAL
Status: DISCONTINUED | OUTPATIENT
Start: 2022-04-13 | End: 2022-04-26 | Stop reason: HOSPADM

## 2022-04-13 RX ORDER — RISPERIDONE 1 MG/1
1 TABLET, ORALLY DISINTEGRATING ORAL NIGHTLY
Status: DISCONTINUED | OUTPATIENT
Start: 2022-04-13 | End: 2022-04-13

## 2022-04-13 RX ORDER — IBUPROFEN 200 MG
1 TABLET ORAL DAILY
Status: DISCONTINUED | OUTPATIENT
Start: 2022-04-13 | End: 2022-04-26 | Stop reason: HOSPADM

## 2022-04-13 RX ORDER — HYDROXYZINE PAMOATE 25 MG/1
50 CAPSULE ORAL EVERY 8 HOURS PRN
Status: DISCONTINUED | OUTPATIENT
Start: 2022-04-13 | End: 2022-04-20

## 2022-04-13 RX ORDER — LISDEXAMFETAMINE DIMESYLATE 40 MG/1
40 CAPSULE ORAL EVERY MORNING
Status: ON HOLD | COMMUNITY
Start: 2022-03-16 | End: 2022-04-18 | Stop reason: HOSPADM

## 2022-04-13 RX ORDER — ENOXAPARIN SODIUM 100 MG/ML
40 INJECTION SUBCUTANEOUS EVERY 24 HOURS
Status: DISCONTINUED | OUTPATIENT
Start: 2022-04-13 | End: 2022-04-13

## 2022-04-13 RX ORDER — LANOLIN ALCOHOL/MO/W.PET/CERES
800 CREAM (GRAM) TOPICAL EVERY 4 HOURS
Status: COMPLETED | OUTPATIENT
Start: 2022-04-13 | End: 2022-04-13

## 2022-04-13 RX ORDER — DIVALPROEX SODIUM 250 MG/1
250 TABLET, DELAYED RELEASE ORAL EVERY 8 HOURS
Status: DISCONTINUED | OUTPATIENT
Start: 2022-04-13 | End: 2022-04-13

## 2022-04-13 RX ORDER — RISPERIDONE 0.5 MG/1
0.5 TABLET, ORALLY DISINTEGRATING ORAL DAILY
Status: DISCONTINUED | OUTPATIENT
Start: 2022-04-13 | End: 2022-04-13

## 2022-04-13 RX ORDER — HYDROXYZINE HYDROCHLORIDE 25 MG/1
50 TABLET, FILM COATED ORAL 3 TIMES DAILY PRN
Status: DISCONTINUED | OUTPATIENT
Start: 2022-04-13 | End: 2022-04-13

## 2022-04-13 RX ORDER — NALOXONE HCL 0.4 MG/ML
0.02 VIAL (ML) INJECTION
Status: DISCONTINUED | OUTPATIENT
Start: 2022-04-13 | End: 2022-04-26 | Stop reason: HOSPADM

## 2022-04-13 RX ORDER — IPRATROPIUM BROMIDE AND ALBUTEROL SULFATE 2.5; .5 MG/3ML; MG/3ML
3 SOLUTION RESPIRATORY (INHALATION) EVERY 4 HOURS PRN
Status: DISCONTINUED | OUTPATIENT
Start: 2022-04-13 | End: 2022-04-26 | Stop reason: HOSPADM

## 2022-04-13 RX ORDER — RISPERIDONE 1 MG/1
2 TABLET, ORALLY DISINTEGRATING ORAL 2 TIMES DAILY
Status: DISCONTINUED | OUTPATIENT
Start: 2022-04-13 | End: 2022-04-19

## 2022-04-13 RX ADMIN — GABAPENTIN 300 MG: 300 CAPSULE ORAL at 08:04

## 2022-04-13 RX ADMIN — ENOXAPARIN SODIUM 100 MG: 100 INJECTION SUBCUTANEOUS at 03:04

## 2022-04-13 RX ADMIN — Medication 800 MG: at 01:04

## 2022-04-13 RX ADMIN — ENOXAPARIN SODIUM 100 MG: 100 INJECTION SUBCUTANEOUS at 04:04

## 2022-04-13 RX ADMIN — FAMOTIDINE 20 MG: 20 TABLET ORAL at 08:04

## 2022-04-13 RX ADMIN — VANCOMYCIN HYDROCHLORIDE 2250 MG: 10 INJECTION, POWDER, LYOPHILIZED, FOR SOLUTION INTRAVENOUS at 03:04

## 2022-04-13 RX ADMIN — TRAMADOL HYDROCHLORIDE 50 MG: 50 TABLET, COATED ORAL at 09:04

## 2022-04-13 RX ADMIN — CLINDAMYCIN IN 5 PERCENT DEXTROSE 600 MG: 12 INJECTION, SOLUTION INTRAVENOUS at 11:04

## 2022-04-13 RX ADMIN — DIVALPROEX SODIUM 250 MG: 250 TABLET, DELAYED RELEASE ORAL at 01:04

## 2022-04-13 RX ADMIN — Medication 800 MG: at 11:04

## 2022-04-13 RX ADMIN — Medication 800 MG: at 05:04

## 2022-04-13 RX ADMIN — ASPIRIN 81 MG CHEWABLE TABLET 81 MG: 81 TABLET CHEWABLE at 08:04

## 2022-04-13 RX ADMIN — RISPERIDONE 2 MG: 1 TABLET, ORALLY DISINTEGRATING ORAL at 08:04

## 2022-04-13 RX ADMIN — ACETAMINOPHEN 650 MG: 325 TABLET ORAL at 06:04

## 2022-04-13 RX ADMIN — FUROSEMIDE 20 MG: 20 TABLET ORAL at 08:04

## 2022-04-13 RX ADMIN — PRAVASTATIN SODIUM 40 MG: 40 TABLET ORAL at 08:04

## 2022-04-13 RX ADMIN — TRAMADOL HYDROCHLORIDE 50 MG: 50 TABLET, COATED ORAL at 12:04

## 2022-04-13 RX ADMIN — ACETAMINOPHEN 650 MG: 325 TABLET ORAL at 08:04

## 2022-04-13 RX ADMIN — CLINDAMYCIN IN 5 PERCENT DEXTROSE 600 MG: 12 INJECTION, SOLUTION INTRAVENOUS at 08:04

## 2022-04-13 RX ADMIN — NICOTINE 1 PATCH: 21 PATCH, EXTENDED RELEASE TRANSDERMAL at 01:04

## 2022-04-13 RX ADMIN — DIVALPROEX SODIUM 1000 MG: 250 TABLET, DELAYED RELEASE ORAL at 09:04

## 2022-04-13 RX ADMIN — RISPERIDONE 0.5 MG: 0.5 TABLET, ORALLY DISINTEGRATING ORAL at 09:04

## 2022-04-13 RX ADMIN — DIVALPROEX SODIUM 250 MG: 250 TABLET, DELAYED RELEASE ORAL at 06:04

## 2022-04-13 RX ADMIN — FLUTICASONE FUROATE AND VILANTEROL TRIFENATATE 1 PUFF: 100; 25 POWDER RESPIRATORY (INHALATION) at 08:04

## 2022-04-13 NOTE — H&P
[facility] Medicine  History & Physical    Patient Name: Audrey Natarajan  MRN: [id number]  Patient Class: OP- Observation  Admission Date: 4/12/2022  Attending Physician: Arcadio Man MD  Primary Care Provider: Donaldo Pena MD         Patient information was obtained from patient, past medical records and ER records.     Subjective:     Principal Problem:Cellulitis of lower extremity    Chief Complaint:   Chief Complaint   Patient presents with    Generalized Body Aches     Patient states she generally doesn't feel good. Patient states she has cellulitis. EMS states she has a wound on her feet from dragging. Patient is a resident of a psych facility. Patient states she has a headache.        HPI: Audrey Natarajan is a 52 yo female with a pmh of DM2, bipolar 1 disorder, cellulitis, COPD, HTN, CAD, DVT/PE. She presented to the ED with c/o fevers x 1 day. She also has BLE swelling and pain and wounds to both feet, worsening x 2 weeks. She had fever up to 102F yesterday. She reports the wounds to her right foot are from dragging her feet while she was angry. Denies drainage to foot wounds. She has had blood clots in the past and has an IVC filter placed in 2012. She was sent here from [facility] where she was under CEC for psychosis. ED workup revealed BLE DVT on venous US, WBC 31, and Hgb 9.9. She received a dose of vancomycin while in the ED.       Past Medical History:   Diagnosis Date    ADHD (attention deficit hyperactivity disorder)     Arthritis     Asthma     Bipolar 1 disorder     Cataract     COPD (chronic obstructive pulmonary disease)     Coronary artery disease     A fib    Depression     bipolar manic depresson    Diabetes mellitus     DVT of lower extremity, bilateral July 2013    bilateral LE DVT. [place] filter placed.     Encounter for blood transfusion     History of blood clots 1. Left Leg=2003; 2.Bilateral Groin=Blood Clots= 5 or 6/  "2013 & 7/2013; 3. LLL of Lung=7/2013;  4. Lt. Lower Leg=7/2013.     Pt. had 1st Blood Clot after [id number], & Last=2013. [place] Filter= Rt.Lateral Neck.    HTN (hypertension) 6/6/2013    Pt states that she does not have hypertension    Hypercholesteremia     Irregular heartbeat     Neuromuscular disorder     neuropathy feet    PE (pulmonary embolism) July 2013     bilat LE DVT.     Restless leg syndrome        Past Surgical History:   Procedure Laterality Date    ABDOMINAL SURGERY  2010    gastric sleeve    BILATERAL OOPHORECTOMY Bilateral 1/12/2015    CHOLECYSTECTOMY      Green' s filter Right 7/4/2012    Right Neck & Tunneled Down.    HERNIA REPAIR      "[place] [facility] around th [place].", pt. states    LAPAROSCOPIC CHOLECYSTECTOMY N/A 9/10/2020    Procedure: CHOLECYSTECTOMY, LAPAROSCOPIC;  Surgeon: Montrell Gutierrez MD;  Location: [facility];  Service: General;  Laterality: N/A;  RN PREOP 9/9----COVID Negative  9/9    OVARIAN CYST REMOVAL  3/13/2014    IL REMOVAL OF OVARY/TUBE(S)      SPLENECTOMY, TOTAL  July 2003    TONSILLECTOMY      as a child    TYMPANOSTOMY TUBE PLACEMENT  1976    VEIN SURGERY  2003    Lt leg       Review of patient's allergies indicates:   Allergen Reactions    Morphine Other (See Comments)     Patient had a psychotic episode after taking Morphine  Agitation, hallucinations    Penicillins Anaphylaxis     itching    Januvia [sitagliptin] Hives       No current facility-administered medications on file prior to encounter.     Current Outpatient Medications on File Prior to Encounter   Medication Sig    acetaminophen (TYLENOL) 500 MG tablet Take 2 tablets (1,000 mg total) by mouth every 6 (six) hours as needed for Pain.    albuterol (PROVENTIL/VENTOLIN HFA) 90 mcg/actuation inhaler Inhale 2 puffs into the lungs every 6 (six) hours as needed for Wheezing. Use with spacer  Dispense with 1 spacer    albuterol sulfate 2.5 mg/0.5 mL Nebu Take 2.5 mg " by nebulization every 4 (four) hours as needed (As needed for shortness of breath). Rescue    albuterol-ipratropium (DUO-NEB) 2.5 mg-0.5 mg/3 mL nebulizer solution Take 3 mLs by nebulization every 6 (six) hours as needed for Wheezing or Shortness of Breath. Rescue    aluminum-magnesium hydroxide-simethicone (MAALOX) 200-200-20 mg/5 mL Susp Take 30 mLs by mouth every 6 (six) hours as needed (indigestion).    ammonium lactate (LAC-HYDRIN) 12 % lotion Apply topically.    apixaban (ELIQUIS) 5 mg Tab Take 1 tablet (5 mg total) by mouth 2 (two) times daily. (Patient not taking: Reported on 3/10/2022)    aspirin 81 MG Chew Take 1 tablet (81 mg total) by mouth once daily.    blood sugar diagnostic Strp To check BG two  times daily, to use with insurance preferred meter    blood-glucose meter kit To check BG once daily, to use with insurance preferred meter    blood-glucose meter kit To check BG two times daily, to use with insurance preferred meter    diclofenac sodium (VOLTAREN) 1 % Gel Apply 2 g topically 4 (four) times daily as needed (Apply to painful area up to 4 times a day as needed for pain). Apply to painful area 4 times a day as needed for pain    dicyclomine (BENTYL) 20 mg tablet Take 1 tablet (20 mg total) by mouth every 6 (six) hours.    divalproex ER (DEPAKOTE) 500 MG Tb24 Take 2,500 mg by mouth.    DUPIXENT  mg/2 mL PnIj SMARTSI Milligram(s) SUB-Q Every 2 Weeks    EPITOL 200 mg tablet     famotidine (PEPCID) 20 MG tablet Take 1 tablet (20 mg total) by mouth 2 (two) times daily. (Patient not taking: Reported on 3/10/2022)    fluticasone propionate (FLONASE) 50 mcg/actuation nasal spray 1 spray (50 mcg total) by Each Nostril route 2 (two) times daily.    fluticasone-salmeterol diskus inhaler 250-50 mcg Inhale 1 puff into the lungs 2 (two) times daily. Controller    folic acid (FOLVITE) 1 MG tablet Take 1 tablet (1 mg total) by mouth once daily.    furosemide (LASIX) 20 MG tablet  TAKE 1 TABLET(20 MG) BY MOUTH EVERY DAY    gabapentin (NEURONTIN) 300 MG capsule TAKE 2 CAPSULES(600 MG) BY MOUTH TWICE DAILY    haloperidoL (HALDOL) 10 MG tablet Take 10 mg by mouth 2 (two) times daily.    hydrOXYzine (ATARAX) 50 MG tablet Take 50 mg by mouth 4 (four) times daily as needed.    hydrOXYzine HCL (ATARAX) 25 MG tablet     hydrOXYzine pamoate (VISTARIL) 50 MG Cap Take 50 mg by mouth nightly as needed.    ibuprofen (ADVIL,MOTRIN) 600 MG tablet TAKE 1 TABLET(600 MG) BY MOUTH EVERY 8 HOURS AS NEEDED FOR PAIN OR BACK PAIN    lancets Misc To check BG two times daily, to use with insurance preferred meter    lisinopriL 10 MG tablet Take 1 tablet (10 mg total) by mouth once daily.    loratadine (CLARITIN) 10 mg tablet Take 1 tablet (10 mg total) by mouth once daily.    metFORMIN (GLUCOPHAGE) 1000 MG tablet TAKE 1 TABLET(1000 MG) BY MOUTH TWICE DAILY WITH MEALS    methocarbamoL (ROBAXIN) 500 MG Tab TAKE 1 TABLET(500 MG) BY MOUTH EVERY 6 HOURS AS NEEDED FOR BACK PAIN    metoprolol tartrate (LOPRESSOR) 50 MG tablet TAKE 1 TABLET(50 MG) BY MOUTH TWICE DAILY    metronidazole 1% (METROGEL) 1 % Gel Apply 1 application topically once daily.    multivitamin Tab Take 1 tablet by mouth once daily.    mupirocin (BACTROBAN) 2 % ointment Apply topically 2 (two) times daily.    nystatin (NYSTOP) powder APPLY TOPICALLY TO AXILLA AND BREAST FOLDS TWICE DAILY    OPTICHAMBER BIGG LG MASK Spcr Inhale into the lungs.    pantoprazole (PROTONIX) 40 MG tablet Take 1 tablet (40 mg total) by mouth once daily.    polyethylene glycol (GLYCOLAX) 17 gram PwPk Take 17 g by mouth 2 (two) times daily. (Patient not taking: Reported on 3/10/2022)    polyvinyl alcohol, artificial tears, (LIQUIFILM TEARS) 1.4 % ophthalmic solution Place 1 drop into both eyes 4 (four) times daily.    pravastatin (PRAVACHOL) 40 MG tablet TAKE 1 TABLET(40 MG) BY MOUTH EVERY EVENING    QUEtiapine (SEROQUEL) 200 MG Tab Take 1 tablet (200 mg  total) by mouth before breakfast.    risperiDONE (RISPERDAL) 4 MG tablet Take 4 mg by mouth nightly.    senna (SENOKOT) 8.6 mg tablet Take 1 tablet by mouth 2 (two) times a day.    triamcinolone acetonide 0.1% (KENALOG) 0.1 % ointment Apply topically 3 (three) times daily.    TRUE METRIX GLUCOSE METER [facility] CHECK BLOOD SUGAR TWICE DAILY    VYVANSE 40 mg Cap Take 40 mg by mouth every morning.    white petrolatum 86.5 % Oint Apply 1 Squirt topically 2 (two) times a day.     Family History       Problem Relation (Age of Onset)    Cataracts Father    Diabetes Father, Paternal Grandfather    Heart disease Father, Paternal Grandfather    Hypertension Father    No Known Problems Mother, Sister, Brother, Maternal Aunt, Maternal Uncle, Paternal Aunt, Paternal Uncle, Maternal Grandfather    Ovarian cancer Maternal Grandmother, Paternal Grandmother          Tobacco Use    Smoking status: Current Every Day Smoker     Packs/day: 0.50     Years: 25.00     Pack years: 12.50     Types: Cigarettes     Last attempt to quit: 2020     Years since quittin.3    Smokeless tobacco: Never Used    Tobacco comment: still smoking 6 cigarettes each day   Substance and Sexual Activity    Alcohol use: No     Alcohol/week: 0.0 standard drinks    Drug use: No    Sexual activity: Yes     Partners: Male     Review of Systems   Constitutional:  Positive for fever. Negative for appetite change.   HENT:  Negative for congestion.    Respiratory:  Negative for cough and shortness of breath.    Cardiovascular:  Positive for leg swelling. Negative for chest pain.   Gastrointestinal:  Negative for abdominal pain, diarrhea, nausea and vomiting.   Genitourinary:  Negative for dysuria.   Musculoskeletal:  Positive for arthralgias.   Skin:  Positive for color change and wound.   Neurological:  Positive for headaches. Negative for weakness.   Psychiatric/Behavioral:  Negative for agitation and confusion.    Objective:     Vital Signs (Most  Recent):  Temp: 98.7 °F (37.1 °C) (04/13/22 0415)  Pulse: (!) 111 (04/13/22 0415)  Resp: 19 (04/13/22 0317)  BP: (!) 150/65 (04/13/22 0415)  SpO2: 96 % (04/13/22 0415)   Vital Signs (24h Range):  Temp:  [98.7 °F (37.1 °C)-99.1 °F (37.3 °C)] 98.7 °F (37.1 °C)  Pulse:  [] 111  Resp:  [16-19] 19  SpO2:  [93 %-97 %] 96 %  BP: (107-150)/(49-65) 150/65     Weight: 109.9 kg (242 lb 4.6 oz)  Body mass index is 40.32 kg/m².    Physical Exam  Vitals and nursing note reviewed.   Constitutional:       General: She is not in acute distress.     Appearance: She is obese. She is not toxic-appearing.   HENT:      Head: Normocephalic and atraumatic.      Nose: Nose normal.      Mouth/Throat:      Mouth: Mucous membranes are moist.   Eyes:      Pupils: Pupils are equal, round, and reactive to light.   Cardiovascular:      Rate and Rhythm: Regular rhythm. Tachycardia present.      Pulses: Normal pulses.      Heart sounds: Normal heart sounds.   Pulmonary:      Effort: Pulmonary effort is normal.      Breath sounds: Normal breath sounds.   Abdominal:      General: Bowel sounds are normal.      Palpations: Abdomen is soft.   Musculoskeletal:         General: Tenderness present. Normal range of motion.      Cervical back: Normal range of motion.      Right lower leg: Edema present.      Left lower leg: Edema present.   Skin:     General: Skin is warm and dry.      Comments: BLE warmth, erythema, edema, lesions to bilateral feet   Neurological:      Mental Status: She is alert and oriented to person, place, and time.   Psychiatric:         Behavior: Behavior normal.         Thought Content: Thought content normal.         CRANIAL NERVES     CN III, IV, VI   Pupils are equal, round, and reactive to light.     Significant Labs: All pertinent labs within the past 24 hours have been reviewed.    Significant Imaging: I have reviewed all pertinent imaging results/findings within the past 24 hours.    Assessment/Plan:     * Cellulitis of  lower extremity  Given vanc in ED  -switch to clindamycin  -BC pending          Anemia  Denies bleeding  -check iron studies        Diabetic foot ulcer associated with type 2 diabetes mellitus  -consult podiatry  -cont clindamycin      Acute deep vein thrombosis (DVT) of both lower extremities  Hx of DVT/PE, hypercoagulable state, IVC filter in place  BLE venous US with thrombosis of bilateral posterior tibial veins  -initiate therapeutic lovenox        SHAWN (obstructive sleep apnea)  Does not use CPAP      Psychosis  Bipolar 1 disorder    -Continue CEC from behavioral health facility  -Consult psych for eval and medication management  -cont Depakote and risperidone      Bipolar 1 disorder  See psychosis        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  A1C 7.0  -Hold metformin  -accuchecks and LDSSI  -diabetic diet  -cont neurontin      COPD (chronic obstructive pulmonary disease)  Cont advair inhaler  duonebs PRN      Essential hypertension  Hold home meds for now in setting of infection        VTE Risk Mitigation (From admission, onward)         Ordered     enoxaparin injection 100 mg  Every 12 hours (non-standard times)         04/13/22 0300     IP VTE HIGH RISK PATIENT  Once         04/13/22 0246     Place sequential compression device  Until discontinued         04/13/22 0246                   Lizeth Briseno NP  Department of Hospital Medicine   [place] - Telemetry

## 2022-04-13 NOTE — HPI
Audrey Natarajan is a 52 yo female with a pmh of DM2, bipolar 1 disorder, cellulitis, COPD, HTN, CAD, DVT/PE. She presented to the ED with c/o fevers x 1 day. She also has BLE swelling and pain and wounds to both feet, worsening x 2 weeks. She had fever up to 102F yesterday. She reports the wounds to her right foot are from dragging her feet while she was angry. Denies drainage to foot wounds. She has had blood clots in the past and has an IVC filter placed in 2012. She was sent here from [facility] where she was under CEC for psychosis. ED workup revealed BLE DVT on venous US, WBC 31, and Hgb 9.9. She received a dose of vancomycin while in the ED.

## 2022-04-13 NOTE — CONSULTS
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 58.5 02/18/2021    CBMZ 3.6 (L) 01/26/2021         EXAMINATION    VITALS   Vitals:    04/13/22 0400 04/13/22 0415 04/13/22 0736 04/13/22 0837   BP:  (!) 150/65 134/60    BP Location:   Left arm    Patient Position:  Sitting Lying    Pulse: (!) 114 (!) 111 101 99   Resp:   20 16   Temp:  98.7 °F (37.1 °C) 97.6 °F (36.4 °C)    TempSrc:  Oral Oral    SpO2:  96% 95% 96%   Weight:  109.9 kg (242 lb 4.6 oz)     Height:  5' 5" (1.651 m)          CONSTITUTIONAL  General Appearance: NAD, unremarkable, age appropriate, lying in bed, overweight    MUSCULOSKELETAL  Muscle Strength and Tone: WNL (limited due to BLE DVTs / cellulitis)   Abnormal Involuntary Movements: none observed   Gait and Station: Attempted but unable to assess due to medical acuity     PSYCHIATRIC   Behavior/Cooperation:  cooperative, restless and fidgety , eye contact intermittent   Speech:  normal tone, normal pitch, normal volume, rapid  Language: grossly intact, able to name, able to repeat with spontaneous speech  Mood: "up and down"  Affect:  Labile   Associations: intermittent TERRI  Thought Process: Linear with intermittent tangential / TERRI " ; mild disorganization   Thought Content: denies SI, HI, AH, VH, TH, delusions, or paranoia (no RIS observed)  Sensorium:  Awake  Alert and Oriented: to person, place, situation, month of year, year  Memory: 3/3 immediate, 2/3 at 5 minutes    Recent: Intact; able to report recent events   Remote: Intact; Named 4/4 past presidents   Attention/concentration: Fair.  Requiring redirection. Appropriate for age/education. Able to spell w-o-r-l-d & d-l-r-o-w.   Similarities: Intact (difference between apple and orange?)  Abstract reasoning: Limited   Insight: Limited  Judgment: Limited    CAM ICU Delirium Assessment - NEGATIVE      Is the patient aware of the biomedical complications associated with substance abuse and mental illness? yes        MEDICAL DECISION MAKING    ASSESSMENT      Bipolar I Disorder (currently severe with mixed s/s of depression and bibi)   Unspecified Insomnia   Hx of ADHD      RECOMMENDATIONS       - Continue PEC/CEC for grave disability 2/2 mental illness at this time (patient appears to be slowly improving now that she is back on her Depakote and Risperdal).    - I spoke to the patient's sister (Anitra Cain at [phone]).  She confirmed that the patient has a long history of Bipolar I Disorder with a hx of decompensation due to medication noncompliance or social / environmental stressors.  She spoke to the patient last night and states that the patient is improving but not back to her baseline yet.  She still believes that she is gravely disabled and in need of further psychiatric stabilization at this time before patient can again independently tend to her ADLs.      - Once medically cleared / stable, seek transfer back to [facility] (inpatient psychiatric facility) for continued mental health treatment / stabilization.    - Increase Risperdal to 2 mg PO BID and Depakote ER to 500 mg PO QAM and 1000 mg PO QHS for Bipolar I Disorder and insomnia (discussed risks/benefits/alt vs no  treatment with patient)    - Begin Vistaril 50 mg PO TID PRN for anxiety and/or insomnia (discussed risks/benefits/alt vs no treatment with patient)    - HOLD previously outpt prescribed Vyvanse (discussed risks/benefits/alt vs no treatment with patient)     - Psychotherapy was performed with patient as noted above with a focus on improving mood / bibi / thought process and sleep.    - Patient's most recent labs, imaging, and EKG were reviewed today; order and f/u trough VPA level in 3-4 days     - Continue suicide / violence precautions and continue to monitor the patient with a sitter while on a PEC / CEC.       - Thank you for this consult ; I will be on leave through Sunday (04/17/2022); Please consult the tele-psychiatry team for any additional needs during that time.          Total time spent with patient and/or managing/coordinating patient's care today (excluding the time spent on psychotherapy): 72 minutes   Time spent on psychotherapy today (as noted above): 19 minutes   Total time for encounter today including psychotherapy: 91 minutes      More than 50% of the time was spent counseling/coordinating care.     Consulting clinician was informed of the encounter and consult note.       STAFF:  Magdiel Del Real MD  [facility]   4/13/2022

## 2022-04-13 NOTE — ASSESSMENT & PLAN NOTE
WBC 31, lactic acid negative  Given vanc in ED  -switched to clindamycin  -BC pending  4/13 cellulitis is improving, difficult with b/l DVTs however will continue antibiotics with significant elevation of WBC

## 2022-04-13 NOTE — PROGRESS NOTES
"Individual Follow-Up Form    4/13/2022    Quit Date: To be determined    Clinical Status of Patient: Inpatient    Length of Service: 30 minutes    Comments: Pt states that she startede smoking at age 15, and that she smoked 1 pk/day until approximately 4 months ago, at which time she cut down to the point where she was no longer buying cigarettes, but only "bumming" cigarettes off of family or friends occasionally.  Pt had recent admission to psych facility, where she relapsed and started smoking again. She is requesting the 21 mg nicotine patch Q day. Provider contacted to request. Pt states that she is ready to quit smoking for good. She is currently enrolled in the [facility]. Ambulatory referral to Smoking Cessation Program following hospital discharge.     Diagnosis: F17.210    Next Visit:  4/28/2022 2:30pm with Carley ([facility] [place]    "

## 2022-04-13 NOTE — ASSESSMENT & PLAN NOTE
Bipolar 1 disorder    -Continue CEC from behavioral health facility  -Consult psych for eval and medication management  -cont Depakote and risperidone

## 2022-04-13 NOTE — ASSESSMENT & PLAN NOTE
Hx of DVT/PE, hypercoagulable state, IVC filter in place  BLE venous US with thrombosis of bilateral posterior tibial veins  -initiated therapeutic lovenox

## 2022-04-13 NOTE — ED PROVIDER NOTES
"Encounter Date: 4/12/2022    SCRIBE #1 NOTE: I, Ashley Balderas, am scribing for, and in the presence of, Vincent Maradiaga MD.       History     Chief Complaint   Patient presents with    Generalized Body Aches     Patient states she generally doesn't feel good. Patient states she has cellulitis. EMS states she has a wound on her feet from dragging. Patient is a resident of a psych facility. Patient states she has a headache.     Patient is a 51-year-old female with a pmhx of bipolar I d/o, cellulits, CAD, DM, DVT of LE, HTN who presents to the ED from [facility] ([facility] under a CEC) for evaluation of myalgias, bilateral lower extremity swelling, bilateral foot pain with wounds to both feet. wound.  Pt reports that she was recently admitted to [facility] in [place] for depression an a "mental breakdown." She reports worsening bilateral lower shin extremity swelling and pain, generalized malaise, subjective fever, myalgias and open wounds to the soles of the bilateral feet.  She denies any cough, nausea, vomiting, diarrhea, confusion, numbness, or tingling.  She does report a frontal headache has been intermittent.      The history is provided by the patient.     Review of patient's allergies indicates:   Allergen Reactions    Morphine Other (See Comments)     Patient had a psychotic episode after taking Morphine  Agitation, hallucinations    Penicillins Anaphylaxis     itching    Januvia [sitagliptin] Hives     Past Medical History:   Diagnosis Date    ADHD (attention deficit hyperactivity disorder)     Arthritis     Asthma     Bipolar 1 disorder     Cataract     COPD (chronic obstructive pulmonary disease)     Coronary artery disease     A fib    Depression     bipolar manic depresson    Diabetes mellitus     DVT of lower extremity, bilateral July 2013    bilateral LE DVT. Eldena filter placed.     Encounter for blood transfusion     History of blood clots " "1. Left Leg=; 2.Bilateral Groin=Blood Clots= 5 or 2013 & 2013; 3. LLL of Lung=2013;  4. Lt. Lower Leg=2013.     Pt. had 1st Blood Clot after Txzveqimitpq=[id number], & Last=. Adrian Filter= Rt.Lateral Neck.    HTN (hypertension) 2013    Pt states that she does not have hypertension    Hypercholesteremia     Irregular heartbeat     Neuromuscular disorder     neuropathy feet    PE (pulmonary embolism) 2013     bilat LE DVT.     Restless leg syndrome      Past Surgical History:   Procedure Laterality Date    ABDOMINAL SURGERY      gastric sleeve    BILATERAL OOPHORECTOMY Bilateral 2015    CHOLECYSTECTOMY      Green' s filter Right 2012    Right Neck & Tunneled Down.    HERNIA REPAIR      "[place] of Hernias Repaires around th [place].", pt. states    LAPAROSCOPIC CHOLECYSTECTOMY N/A 9/10/2020    Procedure: CHOLECYSTECTOMY, LAPAROSCOPIC;  Surgeon: Montrell Gutierrez MD;  Location: [facility] OR;  Service: General;  Laterality: N/A;  RN PREOP ----COVID Negative      OVARIAN CYST REMOVAL  3/13/2014    NE REMOVAL OF OVARY/TUBE(S)      SPLENECTOMY, TOTAL  2003    TONSILLECTOMY      as a child    TYMPANOSTOMY TUBE PLACEMENT      VEIN SURGERY      Lt leg     Family History   Problem Relation Age of Onset    Hypertension Father     Diabetes Father     Heart disease Father     Cataracts Father     Diabetes Paternal Grandfather     Heart disease Paternal Grandfather     No Known Problems Mother     Ovarian cancer Maternal Grandmother          from this. ? age     No Known Problems Sister     No Known Problems Brother     No Known Problems Maternal Aunt     No Known Problems Maternal Uncle     No Known Problems Paternal Aunt     No Known Problems Paternal Uncle     No Known Problems Maternal Grandfather     Ovarian cancer Paternal Grandmother     Uterine cancer Neg Hx     Breast cancer Neg Hx     Colon cancer Neg Hx     Amblyopia Neg Hx  "    Blindness Neg Hx     Cancer Neg Hx     Glaucoma Neg Hx     Macular degeneration Neg Hx     Retinal detachment Neg Hx     Strabismus Neg Hx     Stroke Neg Hx     Thyroid disease Neg Hx      Social History     Tobacco Use    Smoking status: Current Every Day Smoker     Packs/day: 0.50     Years: 25.00     Pack years: 12.50     Types: Cigarettes     Last attempt to quit: 2020     Years since quittin.3    Smokeless tobacco: Never Used    Tobacco comment: still smoking 6 cigarettes each day   Substance Use Topics    Alcohol use: No     Alcohol/week: 0.0 standard drinks    Drug use: No     Review of Systems   Constitutional: Positive for activity change and fever.   HENT: Negative for sore throat.    Respiratory: Negative for shortness of breath.    Cardiovascular: Positive for leg swelling. Negative for chest pain.   Gastrointestinal: Negative for nausea.   Genitourinary: Negative for dysuria.   Musculoskeletal: Positive for arthralgias, joint swelling and myalgias. Negative for back pain.   Skin: Positive for wound. Negative for rash.   Neurological: Positive for weakness and headaches.   Hematological: Does not bruise/bleed easily.       Physical Exam     Initial Vitals [22 2312]   BP Pulse Resp Temp SpO2   (!) 107/49 86 18 99.1 °F (37.3 °C) (!) 93 %      MAP       --         Physical Exam    Nursing note and vitals reviewed.  Constitutional: She appears well-developed and well-nourished. No distress.   Non toxic  No acute distress    HENT:   Head: Normocephalic and atraumatic.   Eyes: Conjunctivae and EOM are normal. Pupils are equal, round, and reactive to light.   Neck: Neck supple.   Normal range of motion.  Cardiovascular: Normal rate, regular rhythm and normal heart sounds.   Pulmonary/Chest: Breath sounds normal. No respiratory distress.   Abdominal: Abdomen is soft. Bowel sounds are normal.   Musculoskeletal:         General: Normal range of motion.      Cervical back: Normal  range of motion and neck supple.     Neurological: She is alert and oriented to person, place, and time. She has normal strength. GCS score is 15. GCS eye subscore is 4. GCS verbal subscore is 5. GCS motor subscore is 6.   Skin: Capillary refill takes less than 2 seconds. There is erythema.   The bilateral lower extremities are edematous and erythematous; there is mild tenderness to the bilateral gastrocnemius region; there is no crepitus or bullae or findings suggestive of necrotizing infection; there is a deformity to the left foot questionable Charcot joint; there is bilateral erythema to the soles of the bilateral feet with associated superficial friction blisters; there is no purulent drainage noted from these blisters, however.  In the bilateral lower extremities are mildly, albeit understandably, tender to palpation; capillary refills less than 2 seconds; the bilateral lower extremities or neurovascularly intact     Psychiatric: She has a normal mood and affect. Her behavior is normal. Thought content normal.         ED Course   Procedures  Labs Reviewed   CBC W/ AUTO DIFFERENTIAL - Abnormal; Notable for the following components:       Result Value    WBC 31.22 (*)     RBC 3.56 (*)     Hemoglobin 9.9 (*)     Hematocrit 30.5 (*)     RDW 15.4 (*)     Platelets 473 (*)     Immature Granulocytes 1.1 (*)     Gran # (ANC) 23.4 (*)     Immature Grans (Abs) 0.33 (*)     Mono # 2.7 (*)     Gran % 74.9 (*)     Lymph % 14.2 (*)     Platelet Estimate Increased (*)     All other components within normal limits   COMPREHENSIVE METABOLIC PANEL - Abnormal; Notable for the following components:    Glucose 139 (*)     Total Protein 5.8 (*)     Albumin 3.1 (*)     All other components within normal limits   CULTURE, BLOOD   CULTURE, BLOOD   PROCALCITONIN   URINALYSIS, REFLEX TO URINE CULTURE   MAGNESIUM   PHOSPHORUS   LACTIC ACID, PLASMA   CK   PROTIME-INR   POCT GLUCOSE MONITORING CONTINUOUS          Imaging Results           US Lower Extremity Veins Bilateral (In process)  Result time 04/13/22 01:20:54               X-Ray Chest AP Portable (Final result)  Result time 04/13/22 01:35:03    Final result by Alla Russell MD (04/13/22 01:35:03)                 Impression:      Increased/prominent interstitial lung markings similar to prior exams.  Findings could relate to possible interstitial edema versus infectious or non-infectious inflammatory etiologies.  Clinical correlation advised.      Electronically signed by: Alla Russell MD  Date:    04/13/2022  Time:    01:35             Narrative:    EXAMINATION:  XR CHEST AP PORTABLE    CLINICAL HISTORY:  Pain, unspecified    TECHNIQUE:  Single frontal view of the chest was performed.    COMPARISON:  04/08/2022, 12/30/2021    FINDINGS:  The cardiomediastinal silhouette is unchanged in size and configuration and mediastinal structures are midline.  The lungs are symmetrically expanded with diffuse increased interstitial attenuation/prominent interstitial markings which appears similar to prior exams.  No confluent airspace consolidation, large volume of pleural fluid or pneumothorax is identified.  Osseous structures are intact.                               X-Ray Tibia Fibula Bilateral (Final result)  Result time 04/13/22 01:21:37    Final result by Tu Belle MD (04/13/22 01:21:37)                 Impression:      Stable right leg with no acute finding.      Electronically signed by: Tu Belle  Date:    04/13/2022  Time:    01:21             Narrative:    EXAMINATION:  XR TIBIA FIBULA BILATERAL    CLINICAL HISTORY:  Pain in right leg    TECHNIQUE:  Two views of the left tibia and fibula    COMPARISON:  09/01/2019    FINDINGS:  Bones, joint spaces and soft tissues appear intact.  There is mild mottled osteopenia unchanged.  No fracture or soft tissue mass is evident.  Fabella is noted.  Degenerative changes in the ankle and knee are seen.                               X-Ray  Foot AP Bilateral (Final result)  Result time 04/13/22 01:36:17    Final result by Lali Belle MD (04/13/22 01:36:17)                 Impression:      No acute abnormality involving the feet bilaterally.    Charcot's joints involving the left foot.    Old fracture deformity of the right 4th metatarsal.    Degenerative changes as described.      Electronically signed by: Lali Belle  Date:    04/13/2022  Time:    01:36             Narrative:    EXAMINATION:  XR FOOT AP BILAT    CLINICAL HISTORY:  bilateral feet pain;    TECHNIQUE:  AP oblique and lateral views were obtained of the left foot    COMPARISON:  Left foot views 10/07/2021    FINDINGS:  Left foot: There is sclerosis and destructive osseous changes compatible with Charcot's arthropathy particularly involving the tarsometatarsal joints similar to prior exam..  No acute fracture or dislocation.    Right foot: There is no evidence of acute process involving the right foot.  Old fracture deformity of the 4th metatarsal distal diaphysis noted.  There are degenerative changes at the great toe MTP joint.  Calcaneal enthesophyte are noted.  There are calcaneal enthesophytes.                                 Medications   vancomycin (VANCOCIN) 2,250 mg in dextrose 5 % 500 mL IVPB (has no administration in time range)   traMADoL tablet 50 mg (50 mg Oral Given 4/13/22 0042)     Medical Decision Making:   Clinical Tests:   Lab Tests: Ordered and Reviewed  Radiological Study: Ordered and Reviewed  ED Management:  - VSS; pt afebrile; NAD noted  - plain radiograph of the bilateral tib/fib demonstrates the following findings per final radiology read: bones, joint spaces and soft tissues appear intact.  There is mild mottled osteopenia unchanged.  No fracture or soft tissue mass is evident.  Fabella is noted.  Degenerative changes in the ankle and knee are seen.  - plain radiograph of the bilateral feet demonstrates the following findings per final radiology read:    - plain radiograph of the chest demonstrates:   - CBC w/diff notable for WBC of 31K; H/H 9.9/30.5 (compared from most recent CBC, pt's H/H was 13/38.5 on 4/7/22)  - CMP unremarkable for significant electrolyte abnormality; renal function WNL   - CXR demonstrates increased/prominent interstitial lung markings similar to prior exams.  Findings could relate to possible interstitial edema versus infectious or non-infectious inflammatory etiologies per final radiology read.     - Lactic acid,  Procalcatonin, CPK,  Blood cultures x 2 ordered, pending  - UA ordered, pending   - PT/INR ordered, pending   - Phos ordered, pending   - Mag ordered, pending   - Formal ultrasound bilateral LE ordered, pending  - Pt to be admitted to [facility] medicine for further evaluation and management           Scribe Attestation:   Scribe #1: I performed the above scribed service and the documentation accurately describes the services I performed. I attest to the accuracy of the note.                 Clinical Impression:   Final diagnoses:  [R60.0] Bilateral lower extremity edema  [M79.604, M79.605] Bilateral leg pain  [M79.604, M79.605, M79.671, M79.672] Bilateral leg and foot pain  [R52] Generalized body aches  [L03.116] Cellulitis of left lower extremity (Primary)          ED Disposition Condition    Observation             I, Dr. Vincent Maradiaga, personally performed the services described in this documentation. All medical record entries made by the scribe were at my direction and in my presence. I have reviewed the chart and agree that the record reflects my personal performance and is accurate and complete.       Vincent Maradiaga MD  04/13/22 0222

## 2022-04-13 NOTE — SUBJECTIVE & OBJECTIVE
"Past Medical History:   Diagnosis Date    ADHD (attention deficit hyperactivity disorder)     Arthritis     Asthma     Bipolar 1 disorder     Cataract     COPD (chronic obstructive pulmonary disease)     Coronary artery disease     A fib    Depression     bipolar manic depresson    Diabetes mellitus     DVT of lower extremity, bilateral July 2013    bilateral LE DVT. Estelita filter placed.     Encounter for blood transfusion     History of blood clots 1. Left Leg=2003; 2.Bilateral Groin=Blood Clots= 5 or 6/ 2013 & 7/2013; 3. LLL of Lung=7/2013;  4. Lt. Lower Leg=7/2013.     Pt. had 1st Blood Clot after Rxaeahkfwhji=4293, & Last=2013. Estelita Filter= Rt.Lateral Neck.    HTN (hypertension) 6/6/2013    Pt states that she does not have hypertension    Hypercholesteremia     Irregular heartbeat     Neuromuscular disorder     neuropathy feet    PE (pulmonary embolism) July 2013     bilat LE DVT.     Restless leg syndrome        Past Surgical History:   Procedure Laterality Date    ABDOMINAL SURGERY  2010    gastric sleeve    BILATERAL OOPHORECTOMY Bilateral 1/12/2015    CHOLECYSTECTOMY      Green' s filter Right 7/4/2012    Right Neck & Tunneled Down.    HERNIA REPAIR      "[place] of Hernias Repaires around th [place]ly Button.", pt. states    LAPAROSCOPIC CHOLECYSTECTOMY N/A 9/10/2020    Procedure: CHOLECYSTECTOMY, LAPAROSCOPIC;  Surgeon: Montrell Gutierrez MD;  Location: [facility] OR;  Service: General;  Laterality: N/A;  RN PREOP 9/9----COVID Negative  9/9    OVARIAN CYST REMOVAL  3/13/2014    NM REMOVAL OF OVARY/TUBE(S)      SPLENECTOMY, TOTAL  July 2003    TONSILLECTOMY      as a child    TYMPANOSTOMY TUBE PLACEMENT  1976    VEIN SURGERY  2003    Lt leg       Review of patient's allergies indicates:   Allergen Reactions    Morphine Other (See Comments)     Patient had a psychotic episode after taking Morphine  Agitation, hallucinations    Penicillins Anaphylaxis     itching    Januvia [sitagliptin] Hives       No current " facility-administered medications on file prior to encounter.     Current Outpatient Medications on File Prior to Encounter   Medication Sig    acetaminophen (TYLENOL) 500 MG tablet Take 2 tablets (1,000 mg total) by mouth every 6 (six) hours as needed for Pain.    albuterol (PROVENTIL/VENTOLIN HFA) 90 mcg/actuation inhaler Inhale 2 puffs into the lungs every 6 (six) hours as needed for Wheezing. Use with spacer  Dispense with 1 spacer    albuterol sulfate 2.5 mg/0.5 mL Nebu Take 2.5 mg by nebulization every 4 (four) hours as needed (As needed for shortness of breath). Rescue    albuterol-ipratropium (DUO-NEB) 2.5 mg-0.5 mg/3 mL nebulizer solution Take 3 mLs by nebulization every 6 (six) hours as needed for Wheezing or Shortness of Breath. Rescue    aluminum-magnesium hydroxide-simethicone (MAALOX) 200-200-20 mg/5 mL Susp Take 30 mLs by mouth every 6 (six) hours as needed (indigestion).    ammonium lactate (LAC-HYDRIN) 12 % lotion Apply topically.    apixaban (ELIQUIS) 5 mg Tab Take 1 tablet (5 mg total) by mouth 2 (two) times daily. (Patient not taking: Reported on 3/10/2022)    aspirin 81 MG Chew Take 1 tablet (81 mg total) by mouth once daily.    blood sugar diagnostic Strp To check BG two  times daily, to use with insurance preferred meter    blood-glucose meter kit To check BG once daily, to use with insurance preferred meter    blood-glucose meter kit To check BG two times daily, to use with insurance preferred meter    diclofenac sodium (VOLTAREN) 1 % Gel Apply 2 g topically 4 (four) times daily as needed (Apply to painful area up to 4 times a day as needed for pain). Apply to painful area 4 times a day as needed for pain    dicyclomine (BENTYL) 20 mg tablet Take 1 tablet (20 mg total) by mouth every 6 (six) hours.    divalproex ER (DEPAKOTE) 500 MG Tb24 Take 2,500 mg by mouth.    DUPIXENT  mg/2 mL PnIj SMARTSI Milligram(s) SUB-Q Every 2 Weeks    EPITOL 200 mg tablet     famotidine (PEPCID) 20 MG  tablet Take 1 tablet (20 mg total) by mouth 2 (two) times daily. (Patient not taking: Reported on 3/10/2022)    fluticasone propionate (FLONASE) 50 mcg/actuation nasal spray 1 spray (50 mcg total) by Each Nostril route 2 (two) times daily.    fluticasone-salmeterol diskus inhaler 250-50 mcg Inhale 1 puff into the lungs 2 (two) times daily. Controller    folic acid (FOLVITE) 1 MG tablet Take 1 tablet (1 mg total) by mouth once daily.    furosemide (LASIX) 20 MG tablet TAKE 1 TABLET(20 MG) BY MOUTH EVERY DAY    gabapentin (NEURONTIN) 300 MG capsule TAKE 2 CAPSULES(600 MG) BY MOUTH TWICE DAILY    haloperidoL (HALDOL) 10 MG tablet Take 10 mg by mouth 2 (two) times daily.    hydrOXYzine (ATARAX) 50 MG tablet Take 50 mg by mouth 4 (four) times daily as needed.    hydrOXYzine HCL (ATARAX) 25 MG tablet     hydrOXYzine pamoate (VISTARIL) 50 MG Cap Take 50 mg by mouth nightly as needed.    ibuprofen (ADVIL,MOTRIN) 600 MG tablet TAKE 1 TABLET(600 MG) BY MOUTH EVERY 8 HOURS AS NEEDED FOR PAIN OR BACK PAIN    lancets Misc To check BG two times daily, to use with insurance preferred meter    lisinopriL 10 MG tablet Take 1 tablet (10 mg total) by mouth once daily.    loratadine (CLARITIN) 10 mg tablet Take 1 tablet (10 mg total) by mouth once daily.    metFORMIN (GLUCOPHAGE) 1000 MG tablet TAKE 1 TABLET(1000 MG) BY MOUTH TWICE DAILY WITH MEALS    methocarbamoL (ROBAXIN) 500 MG Tab TAKE 1 TABLET(500 MG) BY MOUTH EVERY 6 HOURS AS NEEDED FOR BACK PAIN    metoprolol tartrate (LOPRESSOR) 50 MG tablet TAKE 1 TABLET(50 MG) BY MOUTH TWICE DAILY    metronidazole 1% (METROGEL) 1 % Gel Apply 1 application topically once daily.    multivitamin Tab Take 1 tablet by mouth once daily.    mupirocin (BACTROBAN) 2 % ointment Apply topically 2 (two) times daily.    nystatin (NYSTOP) powder APPLY TOPICALLY TO AXILLA AND BREAST FOLDS TWICE DAILY    OPTICHAMBER BIGG LG MASK Spcr Inhale into the lungs.    pantoprazole (PROTONIX) 40 MG tablet Take 1  tablet (40 mg total) by mouth once daily.    polyethylene glycol (GLYCOLAX) 17 gram PwPk Take 17 g by mouth 2 (two) times daily. (Patient not taking: Reported on 3/10/2022)    polyvinyl alcohol, artificial tears, (LIQUIFILM TEARS) 1.4 % ophthalmic solution Place 1 drop into both eyes 4 (four) times daily.    pravastatin (PRAVACHOL) 40 MG tablet TAKE 1 TABLET(40 MG) BY MOUTH EVERY EVENING    QUEtiapine (SEROQUEL) 200 MG Tab Take 1 tablet (200 mg total) by mouth before breakfast.    risperiDONE (RISPERDAL) 4 MG tablet Take 4 mg by mouth nightly.    senna (SENOKOT) 8.6 mg tablet Take 1 tablet by mouth 2 (two) times a day.    triamcinolone acetonide 0.1% (KENALOG) 0.1 % ointment Apply topically 3 (three) times daily.    TRUE METRIX GLUCOSE METER Misc CHECK BLOOD SUGAR TWICE DAILY    VYVANSE 40 mg Cap Take 40 mg by mouth every morning.    white petrolatum 86.5 % Oint Apply 1 Squirt topically 2 (two) times a day.     Family History       Problem Relation (Age of Onset)    Cataracts Father    Diabetes Father, Paternal Grandfather    Heart disease Father, Paternal Grandfather    Hypertension Father    No Known Problems Mother, Sister, Brother, Maternal Aunt, Maternal Uncle, Paternal Aunt, Paternal Uncle, Maternal Grandfather    Ovarian cancer Maternal Grandmother, Paternal Grandmother          Tobacco Use    Smoking status: Current Every Day Smoker     Packs/day: 0.50     Years: 25.00     Pack years: 12.50     Types: Cigarettes     Last attempt to quit: 2020     Years since quittin.3    Smokeless tobacco: Never Used    Tobacco comment: still smoking 6 cigarettes each day   Substance and Sexual Activity    Alcohol use: No     Alcohol/week: 0.0 standard drinks    Drug use: No    Sexual activity: Yes     Partners: Male     Review of Systems   Constitutional:  Positive for fever. Negative for appetite change.   HENT:  Negative for congestion.    Respiratory:  Negative for cough and shortness of breath.     Cardiovascular:  Positive for leg swelling. Negative for chest pain.   Gastrointestinal:  Negative for abdominal pain, diarrhea, nausea and vomiting.   Genitourinary:  Negative for dysuria.   Musculoskeletal:  Positive for arthralgias.   Skin:  Positive for color change and wound.   Neurological:  Positive for headaches. Negative for weakness.   Psychiatric/Behavioral:  Negative for agitation and confusion.    Objective:     Vital Signs (Most Recent):  Temp: 98.7 °F (37.1 °C) (04/13/22 0415)  Pulse: (!) 111 (04/13/22 0415)  Resp: 19 (04/13/22 0317)  BP: (!) 150/65 (04/13/22 0415)  SpO2: 96 % (04/13/22 0415)   Vital Signs (24h Range):  Temp:  [98.7 °F (37.1 °C)-99.1 °F (37.3 °C)] 98.7 °F (37.1 °C)  Pulse:  [] 111  Resp:  [16-19] 19  SpO2:  [93 %-97 %] 96 %  BP: (107-150)/(49-65) 150/65     Weight: 109.9 kg (242 lb 4.6 oz)  Body mass index is 40.32 kg/m².    Physical Exam  Vitals and nursing note reviewed.   Constitutional:       General: She is not in acute distress.     Appearance: She is obese. She is not toxic-appearing.   HENT:      Head: Normocephalic and atraumatic.      Nose: Nose normal.      Mouth/Throat:      Mouth: Mucous membranes are moist.   Eyes:      Pupils: Pupils are equal, round, and reactive to light.   Cardiovascular:      Rate and Rhythm: Regular rhythm. Tachycardia present.      Pulses: Normal pulses.      Heart sounds: Normal heart sounds.   Pulmonary:      Effort: Pulmonary effort is normal.      Breath sounds: Normal breath sounds.   Abdominal:      General: Bowel sounds are normal.      Palpations: Abdomen is soft.   Musculoskeletal:         General: Tenderness present. Normal range of motion.      Cervical back: Normal range of motion.      Right lower leg: Edema present.      Left lower leg: Edema present.   Skin:     General: Skin is warm and dry.      Comments: BLE warmth, erythema, edema, lesions to bilateral feet   Neurological:      Mental Status: She is alert and oriented to  person, place, and time.   Psychiatric:         Behavior: Behavior normal.         Thought Content: Thought content normal.         CRANIAL NERVES     CN III, IV, VI   Pupils are equal, round, and reactive to light.     Significant Labs: All pertinent labs within the past 24 hours have been reviewed.    Significant Imaging: I have reviewed all pertinent imaging results/findings within the past 24 hours.

## 2022-04-13 NOTE — PROGRESS NOTES
[facility]  Progress Note    Patient Name: Audrey Natarajan  MRN: [id number]  Patient Class: OP- Observation   Admission Date: 4/12/2022  Length of Stay: 0 days  Attending Physician: Lorena Ferguson MD  Primary Care Provider: Donaldo Pena MD        Subjective:     Principal Problem:Acute deep vein thrombosis (DVT) of both lower extremities        HPI:  Audrey Natarajan is a 52 yo female with a pmh of DM2, bipolar 1 disorder, cellulitis, COPD, HTN, CAD, DVT/PE. She presented to the ED with c/o fevers x 1 day. She also has BLE swelling and pain and wounds to both feet, worsening x 2 weeks. She had fever up to 102F yesterday. She reports the wounds to her right foot are from dragging her feet while she was angry. Denies drainage to foot wounds. She has had blood clots in the past and has an IVC filter placed in 2012. She was sent here from [facility] where she was under CEC for psychosis. ED workup revealed BLE DVT on venous US, WBC 31, and Hgb 9.9. She received a dose of vancomycin while in the ED.       Overview/Hospital Course:  Pt placed in observation for cellulitis and DVT.  Pt with h/o DVT/PE, hypercoagulable state, IVC filter in place.  Therapeutic lovenox initiated.  Podiatry consulted for diabetic foot ulcer.  Psych consulted for medication evaluation, pt with CEC from behavioral health facility.      Interval History:  no acute events overnight, no new complaints.  Pt feels erythema of lower extremities has improved.    Review of Systems   Constitutional:  Negative for appetite change and fever.   HENT:  Negative for congestion.    Respiratory:  Negative for cough and shortness of breath.    Cardiovascular:  Positive for leg swelling. Negative for chest pain.   Gastrointestinal:  Negative for abdominal pain, diarrhea, nausea and vomiting.   Genitourinary:  Negative for dysuria.   Musculoskeletal:  Positive for arthralgias.   Skin:  Positive for color change and  wound.   Neurological:  Positive for headaches. Negative for weakness.   Psychiatric/Behavioral:  Negative for agitation and confusion.    Objective:     Vital Signs (Most Recent):  Temp: 97.6 °F (36.4 °C) (04/13/22 0736)  Pulse: 99 (04/13/22 0837)  Resp: 16 (04/13/22 0837)  BP: 134/60 (04/13/22 0736)  SpO2: 96 % (04/13/22 0837) Vital Signs (24h Range):  Temp:  [97.6 °F (36.4 °C)-99.1 °F (37.3 °C)] 97.6 °F (36.4 °C)  Pulse:  [] 99  Resp:  [16-20] 16  SpO2:  [93 %-97 %] 96 %  BP: (107-150)/(49-65) 134/60     Weight: 109.9 kg (242 lb 4.6 oz)  Body mass index is 40.32 kg/m².    Intake/Output Summary (Last 24 hours) at 4/13/2022 [facility]  Last data filed at 4/13/2022 0618  Gross per 24 hour   Intake --   Output 900 ml   Net -900 ml      Physical Exam  Vitals and nursing note reviewed.   Constitutional:       General: She is not in acute distress.     Appearance: She is obese. She is not toxic-appearing.   HENT:      Head: Normocephalic and atraumatic.      Nose: Nose normal.      Mouth/Throat:      Mouth: Mucous membranes are moist.   Eyes:      Pupils: Pupils are equal, round, and reactive to light.   Cardiovascular:      Rate and Rhythm: Regular rhythm. Tachycardia present.      Pulses: Normal pulses.      Heart sounds: Normal heart sounds.   Pulmonary:      Effort: Pulmonary effort is normal.      Breath sounds: Normal breath sounds.   Abdominal:      General: Bowel sounds are normal.      Palpations: Abdomen is soft.   Musculoskeletal:         General: Tenderness present. Normal range of motion.      Cervical back: Normal range of motion.      Right lower leg: No edema.      Left lower leg: No edema.   Skin:     General: Skin is warm and dry.      Comments: BLE warmth, erythema mid tib b/l with edema improved.  Sloughing of skin on R foot and wound L midfoot (see photos)   Neurological:      Mental Status: She is alert and oriented to person, place, and time.   Psychiatric:         Behavior: Behavior normal.          Thought Content: Thought content normal.                 Significant Labs: All pertinent labs within the past 24 hours have been reviewed.    Significant Imaging: I have reviewed all pertinent imaging results/findings within the past 24 hours.      Assessment/Plan:      * Acute deep vein thrombosis (DVT) of both lower extremities  Hx of DVT/PE, hypercoagulable state, IVC filter in place  BLE venous US with thrombosis of bilateral posterior tibial veins  -initiated therapeutic lovenox        Cellulitis of lower extremity  WBC 31, lactic acid negative  Given vanc in ED  -switched to clindamycin  -BC pending  4/13 cellulitis is improving, difficult with b/l DVTs however will continue antibiotics with significant elevation of WBC        Diabetic foot ulcer associated with type 2 diabetes mellitus  B/l foot ulcers  -consulted podiatry  -cont clindamycin      Psychosis  Bipolar 1 disorder    -Continue CEC from behavioral health facility  -Consulted psych for eval and medication management  -cont Depakote and risperidone      Bipolar 1 disorder  See psychosis        Anemia  Denies bleeding, baseline 12-13, 9.9 on admit  -check iron studies        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  A1C 7.0  -Hold metformin  -accuchecks and LDSSI  -diabetic diet  -cont neurontin  Currently at goal for hospitalization    Essential hypertension  Hold home meds for now in setting of infection      COPD (chronic obstructive pulmonary disease)  Cont advair inhaler  duonebs PRN      SHAWN (obstructive sleep apnea)  Does not use CPAP        VTE Risk Mitigation (From admission, onward)         Ordered     enoxaparin injection 100 mg  Every 12 hours (non-standard times)         04/13/22 0300     IP VTE HIGH RISK PATIENT  Once         04/13/22 0246     Place sequential compression device  Until discontinued         04/13/22 0246                Discharge Planning   HUMBERTO: 4/14/2022     Code Status: Full Code   Is the patient medically  ready for discharge?:     Reason for patient still in hospital (select all that apply): Treatment and Consult recommendations                     Melina Rain PA-C  Department of [facility] Medicine   [facility]

## 2022-04-13 NOTE — PROGRESS NOTES
04/13/22 0604   Admission   Initial VN Admission Questions Complete   Communication Issues? None   Shift   Virtual Nurse - Patient Verbalized Approval Of Camera Use   Safety/Activity   Patient Rounds bed in low position;call light in patient/parent reach;visualized patient   Safety Promotion/Fall Prevention Fall Risk reviewed with patient/family

## 2022-04-13 NOTE — ASSESSMENT & PLAN NOTE
Contributing Nutrition Diagnosis  Increased nurtrient needs, protein    Related to (etiology):   Wound healing    Signs and Symptoms (as evidenced by):   Pt with diabetic foot ulcer of both feet, and cellulitis of lower extremity     Interventions:  Collaboration with other providers  [employer]- Optisource 1x/day  Modified Beverage- Khurram BID    Nutrition Diagnosis Status:   Continues

## 2022-04-13 NOTE — CONSULTS
Consult received for foot wounds- podiatry at bedside and will be managing foot wounds. Notified nurse.

## 2022-04-13 NOTE — PLAN OF CARE
Recommendation:   1. Continue current diabetic 1500 kcal diet as tolerated.   2. Addition of Optisource 1x/day to supplement protein needs.   3. Addition of Khurram BID to promote wound healing.   4. Monitor weight/labs.   5. RD to follow and monitor intake    Goals:   Pt intake >/= 75% EEN/EPN by RD follow up    Nutrition Goal Status: new  Communication of RD Recs: other (comment) (POC)      Problem: Impaired Wound Healing  Goal: Optimal Wound Healing  Outcome: Ongoing, Progressing

## 2022-04-13 NOTE — ASSESSMENT & PLAN NOTE
Hx of DVT/PE, hypercoagulable state, IVC filter in place  BLE venous US with thrombosis of bilateral posterior tibial veins  -initiate therapeutic lovenox
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
## 2022-04-13 NOTE — PROGRESS NOTES
[facility] - Telemetry  Adult Nutrition  Progress Note    SUMMARY       Recommendations    Recommendation:   1. Continue current diabetic 1500 kcal diet as tolerated.   2. Addition of Optisource 1x/day to supplement protein needs.   3. Addition of Khurram BID to promote wound healing.   4. Monitor weight/labs.   5. RD to follow and monitor intake    Goals:   Pt intake >/= 75% EEN/EPN by RD follow up    Nutrition Goal Status: new  Communication of RD Recs: other (comment) (POC)    Assessment and Plan    Diabetic foot ulcer associated with type 2 diabetes mellitus  Contributing Nutrition Diagnosis  Increased nurtrient needs, protein    Related to (etiology):   Wound healing    Signs and Symptoms (as evidenced by):   Pt with diabetic foot ulcer of both feet, and cellulitis of lower extremity     Interventions:  Collaboration with other providers  [employer]- Optisource 1x/day  Modified Beverage- Khurram BID    Nutrition Diagnosis Status:   New           Reason for Assessment    Reason For Assessment: identified at risk by screening criteria (MST)  Diagnosis: infection/sepsis (cellulitis of lower extremity)  Relevant Medical History: COPD, DMT2, hypercholesteremia, irregular heartbeat, DVT, PE, CAD, bipolar 1 disorder, neuromuscular disorder, RLS, depression, HTN, ADHD, splenectomy, hernia repair, cholecystectomy  Interdisciplinary Rounds: did not attend  General Information Comments: Pt receiving diabetic 1500 kcal diet with good appetite per pt. Pt receiving wound care to foot wounds. Previously with N/V, denies N/V/D/C at this time. Willing to drink Khurram BID. PIV. Santiago Score: 20 bilateral foot wound NFPE not warrented at this time pt with 50 lbs weight gain in the past year per weight history  Nutrition Discharge Planning: d/c on diabetic cardiac diet with Khurram BID for 14 days    Nutrition Risk Screen    Nutrition Risk Screen: no indicators present    Nutrition/Diet History    Food Preferences: no cultural or  "spiritual food preferences identified  Food Allergies: NKFA  Factors Affecting Nutritional Intake: None identified at this time    Anthropometrics    Temp: 96.6 °F (35.9 °C)  Height Method: Estimated  Height: 5' 5" (165.1 cm)  Height (inches): 65 in  Weight Method: Bed Scale  Weight: 109.9 kg (242 lb 4.6 oz)  Weight (lb): 242.29 lb  Ideal Body Weight (IBW), Female: 125 lb  % Ideal Body Weight, Female (lb): 193.83 %  BMI (Calculated): 40.3  BMI Grade: greater than 40 - morbid obesity       Lab/Procedures/Meds    Pertinent Labs Reviewed: reviewed  Pertinent Labs Comments: Glu 139, Total pro 5.8, Alb 3.1, Mg 1.4, A1C 7.0  Pertinent Medications Reviewed: reviewed  Pertinent Medications Comments: aspirin, clindamycin, divalproex, enoxaparin, famotidine, furosemide, gabapentin, statin, risperidone      Estimated/Assessed Needs    Weight Used For Calorie Calculations: 109.9 kg (242 lb 4.6 oz)  Energy Calorie Requirements (kcal): 1725  Energy Need Method: [place]-St Jeor (x 1.0 2/2 BMI > 40)  Protein Requirements: 88 (0.8 gm/kg 2/2 BMI >40)  Weight Used For Protein Calculations: 109.9 kg (242 lb 4.6 oz)     Estimated Fluid Requirement Method: RDA Method (or PER MD)  RDA Method (mL): 1725  CHO Requirement: 195 gm/day      Nutrition Prescription Ordered    Current Diet Order: Diabetic 1500 kcal    Evaluation of Received Nutrient/Fluid Intake    I/O: -900  Energy Calories Required: meeting needs  Protein Required: not meeting needs  Fluid Required: meeting needs  Comments: LBM N/A  Tolerance: tolerating  % Intake of Estimated Energy Needs: 50 - 75 %  % Meal Intake: 50 - 75 %    Nutrition Risk    Level of Risk/Frequency of Follow-up:  (1x/week)     Monitor and Evaluation    Food and Nutrient Intake: energy intake, food and beverage intake  Food and Nutrient Adminstration: diet order  Knowledge/Beliefs/Attitudes: food and nutrition knowledge/skill  Physical Activity and Function: nutrition-related ADLs and IADLs  Anthropometric " Measurements: weight, weight change, body mass index  Biochemical Data, Medical Tests and Procedures: gastrointestinal profile, electrolyte and renal panel, glucose/endocrine profile, inflammatory profile, lipid profile  Nutrition-Focused Physical Findings: overall appearance     Nutrition Follow-Up    RD Follow-up?: Yes

## 2022-04-13 NOTE — PROGRESS NOTES
Pharmacokinetic Initial Assessment: IV Vancomycin    Assessment/Plan:    Initiate intravenous vancomycin with loading dose of 2250 mg once   Please contact pharmacy at extension [phone] with any questions regarding this assessment.     Thank you for the consult,   Ashley Damon       Patient brief summary:  Audrey Natarajan is a 51 y.o. female initiated on antimicrobial therapy with IV Vancomycin for treatment of suspected skin & soft tissue infection    Drug Allergies:   Review of patient's allergies indicates:   Allergen Reactions    Morphine Other (See Comments)     Patient had a psychotic episode after taking Morphine  Agitation, hallucinations    Penicillins Anaphylaxis     itching    Januvia [sitagliptin] Hives       Actual Body Weight:   101.2 kg    Renal Function:   Estimated Creatinine Clearance: 133.8 mL/min (based on SCr of 0.6 mg/dL).,     Dialysis Method (if applicable):  N/A    CBC (last 72 hours):  Recent Labs   Lab Result Units 04/13/22  0028   WBC K/uL 31.22*   Hemoglobin g/dL 9.9*   Hematocrit % 30.5*   Platelets K/uL 473*   Gran % % 74.9*   Lymph % % 14.2*   Mono % % 8.7   Eosinophil % % 0.7   Basophil % % 0.4   Differential Method  Automated       Metabolic Panel (last 72 hours):  Recent Labs   Lab Result Units 04/13/22  0028   Sodium mmol/L 137   Potassium mmol/L 4.6   Chloride mmol/L 99   CO2 mmol/L 24   Glucose mg/dL 139*   BUN mg/dL 15   Creatinine mg/dL 0.6   Albumin g/dL 3.1*   Total Bilirubin mg/dL 0.3   Alkaline Phosphatase U/L 75   AST U/L 16   ALT U/L 28       Drug levels (last 3 results):  No results for input(s): VANCOMYCINRA, VANCOMYCINPE, VANCOMYCINTR in the last 72 hours.    Microbiologic Results:  Microbiology Results (last 7 days)       Procedure Component Value Units Date/Time    Blood culture [[id number]]     Order Status: Sent Specimen: Blood     Blood culture [[id number]]     Order Status: Sent Specimen: Blood

## 2022-04-13 NOTE — ASSESSMENT & PLAN NOTE
A1C 7.0  -Hold metformin  -accuchecks and LDSSI  -diabetic diet  -cont neurontin  Currently at goal for hospitalization

## 2022-04-13 NOTE — SUBJECTIVE & OBJECTIVE
Interval History:  no acute events overnight, no new complaints.  Pt feels erythema of lower extremities has improved.    Review of Systems   Constitutional:  Negative for appetite change and fever.   HENT:  Negative for congestion.    Respiratory:  Negative for cough and shortness of breath.    Cardiovascular:  Positive for leg swelling. Negative for chest pain.   Gastrointestinal:  Negative for abdominal pain, diarrhea, nausea and vomiting.   Genitourinary:  Negative for dysuria.   Musculoskeletal:  Positive for arthralgias.   Skin:  Positive for color change and wound.   Neurological:  Positive for headaches. Negative for weakness.   Psychiatric/Behavioral:  Negative for agitation and confusion.    Objective:     Vital Signs (Most Recent):  Temp: 97.6 °F (36.4 °C) (04/13/22 0736)  Pulse: 99 (04/13/22 0837)  Resp: 16 (04/13/22 0837)  BP: 134/60 (04/13/22 0736)  SpO2: 96 % (04/13/22 0837) Vital Signs (24h Range):  Temp:  [97.6 °F (36.4 °C)-99.1 °F (37.3 °C)] 97.6 °F (36.4 °C)  Pulse:  [] 99  Resp:  [16-20] 16  SpO2:  [93 %-97 %] 96 %  BP: (107-150)/(49-65) 134/60     Weight: 109.9 kg (242 lb 4.6 oz)  Body mass index is 40.32 kg/m².    Intake/Output Summary (Last 24 hours) at 4/13/2022 1037  Last data filed at 4/13/2022 0618  Gross per 24 hour   Intake --   Output 900 ml   Net -900 ml      Physical Exam  Vitals and nursing note reviewed.   Constitutional:       General: She is not in acute distress.     Appearance: She is obese. She is not toxic-appearing.   HENT:      Head: Normocephalic and atraumatic.      Nose: Nose normal.      Mouth/Throat:      Mouth: Mucous membranes are moist.   Eyes:      Pupils: Pupils are equal, round, and reactive to light.   Cardiovascular:      Rate and Rhythm: Regular rhythm. Tachycardia present.      Pulses: Normal pulses.      Heart sounds: Normal heart sounds.   Pulmonary:      Effort: Pulmonary effort is normal.      Breath sounds: Normal breath sounds.   Abdominal:       General: Bowel sounds are normal.      Palpations: Abdomen is soft.   Musculoskeletal:         General: Tenderness present. Normal range of motion.      Cervical back: Normal range of motion.      Right lower leg: No edema.      Left lower leg: No edema.   Skin:     General: Skin is warm and dry.      Comments: BLE warmth, erythema mid tib b/l with edema improved.  Sloughing of skin on R foot and wound L midfoot (see photos)   Neurological:      Mental Status: She is alert and oriented to person, place, and time.   Psychiatric:         Behavior: Behavior normal.         Thought Content: Thought content normal.                 Significant Labs: All pertinent labs within the past 24 hours have been reviewed.    Significant Imaging: I have reviewed all pertinent imaging results/findings within the past 24 hours.

## 2022-04-13 NOTE — ASSESSMENT & PLAN NOTE
Bipolar 1 disorder    -Continue CEC from behavioral health facility  -Consulted psych for eval and medication management  -cont Depakote and risperidone

## 2022-04-13 NOTE — HOSPITAL COURSE
Admitted for diabetic foot infection, cellulitis, and DVT. Patient is now in a Judicial Commitment Petition Filed Status (filed on 04/21/2022) due to continued grave disability 2/2 mental illness at this time (will have court paperwork scanned into chart once received from the hospital ). History of DVT/PE, hypercoagulable state, IVC filter in place. Therapeutic lovenox initiated, transitioned to eliquis. Podiatry consulted for diabetic foot ulcer, debrided on 4/13 with cultures obtained - growing MRSA. Started on IV Clindamycin, switched to bactrim with flagyl, with further discontinuation of bactrim and initiation of vancomycin. ID consulted to follow for antibiotic regimen. Psych consulted for medication evaluation, pt with CEC from behavioral health facility. Now a Judicial Commitment Petition Filed Status (filed 4/21) due to continued grave disability 2/2 mental illness at this time (will have court paperwork scanned into chart once received from the hospital ) facility. Developed ODESSA with increase in Cr to 2.4 as well as hyperkalemia, nephrology consulted for assistance in management. ODESSA and hyperkalemia have since resolved (4/25). Renal US showed minimally dilated central renal collecting system on the left and mildly elevated resistive indices, findings which may be seen in setting of medical renal disease. Heme/onc consulted given iliofemoral lymphadenopathy, possibly reactive or neoplastic identified on CT abd/pelvis on 4/19, likely secondary to panniculitis, no further work up needed. Patient stable for discharge to inpatient psych facility with two week course (end date 5/4) of doxycycline and metronidazole with podiatry and PCP follow up. Contact precautions required for MRSA infection present barrier to discharge as patient is not likely to be accepted by an inpatient psych facility with active precautions. [facility] is working  on placement for patient.

## 2022-04-13 NOTE — NURSING
Patient arrived to floor with 2 gowns, draped over the neck and a non functioning iv. RN attempted 3x to obtain working iv. Charge RN and ICU RN notified of need to help obtaining iv access. IV abx on hold until patent iv obtained.

## 2022-04-14 ENCOUNTER — CLINICAL SUPPORT (OUTPATIENT)
Dept: SMOKING CESSATION | Facility: CLINIC | Age: 52
End: 2022-04-14

## 2022-04-14 DIAGNOSIS — F17.210 CIGARETTE SMOKER: Primary | ICD-10-CM

## 2022-04-14 LAB
ALBUMIN SERPL BCP-MCNC: 3.2 G/DL (ref 3.5–5.2)
ALP SERPL-CCNC: 100 U/L (ref 55–135)
ALT SERPL W/O P-5'-P-CCNC: 67 U/L (ref 10–44)
ANION GAP SERPL CALC-SCNC: 12 MMOL/L (ref 8–16)
AST SERPL-CCNC: 40 U/L (ref 10–40)
BASOPHILS # BLD AUTO: 0.1 K/UL (ref 0–0.2)
BASOPHILS NFR BLD: 0.7 % (ref 0–1.9)
BILIRUB SERPL-MCNC: 0.2 MG/DL (ref 0.1–1)
BUN SERPL-MCNC: 12 MG/DL (ref 6–20)
CALCIUM SERPL-MCNC: 10.4 MG/DL (ref 8.7–10.5)
CHLORIDE SERPL-SCNC: 96 MMOL/L (ref 95–110)
CO2 SERPL-SCNC: 30 MMOL/L (ref 23–29)
CREAT SERPL-MCNC: 0.7 MG/DL (ref 0.5–1.4)
DIFFERENTIAL METHOD: ABNORMAL
EOSINOPHIL # BLD AUTO: 0.3 K/UL (ref 0–0.5)
EOSINOPHIL NFR BLD: 2.3 % (ref 0–8)
ERYTHROCYTE [DISTWIDTH] IN BLOOD BY AUTOMATED COUNT: 15.4 % (ref 11.5–14.5)
EST. GFR  (AFRICAN AMERICAN): >60 ML/MIN/1.73 M^2
EST. GFR  (NON AFRICAN AMERICAN): >60 ML/MIN/1.73 M^2
GLUCOSE SERPL-MCNC: 179 MG/DL (ref 70–110)
HCT VFR BLD AUTO: 33.8 % (ref 37–48.5)
HGB BLD-MCNC: 10.8 G/DL (ref 12–16)
IMM GRANULOCYTES # BLD AUTO: 0.1 K/UL (ref 0–0.04)
IMM GRANULOCYTES NFR BLD AUTO: 0.7 % (ref 0–0.5)
LYMPHOCYTES # BLD AUTO: 3.1 K/UL (ref 1–4.8)
LYMPHOCYTES NFR BLD: 22.2 % (ref 18–48)
MCH RBC QN AUTO: 27.7 PG (ref 27–31)
MCHC RBC AUTO-ENTMCNC: 32 G/DL (ref 32–36)
MCV RBC AUTO: 87 FL (ref 82–98)
MONOCYTES # BLD AUTO: 1.4 K/UL (ref 0.3–1)
MONOCYTES NFR BLD: 10.1 % (ref 4–15)
NEUTROPHILS # BLD AUTO: 9 K/UL (ref 1.8–7.7)
NEUTROPHILS NFR BLD: 64 % (ref 38–73)
NRBC BLD-RTO: 0 /100 WBC
PLATELET # BLD AUTO: 558 K/UL (ref 150–450)
PMV BLD AUTO: 10 FL (ref 9.2–12.9)
POCT GLUCOSE: 136 MG/DL (ref 70–110)
POCT GLUCOSE: 140 MG/DL (ref 70–110)
POCT GLUCOSE: 163 MG/DL (ref 70–110)
POCT GLUCOSE: 165 MG/DL (ref 70–110)
POCT GLUCOSE: 181 MG/DL (ref 70–110)
POCT GLUCOSE: 212 MG/DL (ref 70–110)
POCT GLUCOSE: 222 MG/DL (ref 70–110)
POCT GLUCOSE: 247 MG/DL (ref 70–110)
POTASSIUM SERPL-SCNC: 4.2 MMOL/L (ref 3.5–5.1)
PROT SERPL-MCNC: 7.3 G/DL (ref 6–8.4)
RBC # BLD AUTO: 3.9 M/UL (ref 4–5.4)
SODIUM SERPL-SCNC: 138 MMOL/L (ref 136–145)
WBC # BLD AUTO: 14.07 K/UL (ref 3.9–12.7)

## 2022-04-14 PROCEDURE — 82746 ASSAY OF FOLIC ACID SERUM: CPT | Performed by: HOSPITALIST

## 2022-04-14 PROCEDURE — S4991 NICOTINE PATCH NONLEGEND: HCPCS | Performed by: HOSPITALIST

## 2022-04-14 PROCEDURE — 99231 PR SUBSEQUENT HOSPITAL CARE,LEVL I: ICD-10-PCS | Mod: ,,, | Performed by: PODIATRIST

## 2022-04-14 PROCEDURE — 94640 AIRWAY INHALATION TREATMENT: CPT

## 2022-04-14 PROCEDURE — 99406 BEHAV CHNG SMOKING 3-10 MIN: CPT | Mod: S$GLB,,,

## 2022-04-14 PROCEDURE — 94761 N-INVAS EAR/PLS OXIMETRY MLT: CPT

## 2022-04-14 PROCEDURE — 85025 COMPLETE CBC W/AUTO DIFF WBC: CPT | Performed by: HOSPITALIST

## 2022-04-14 PROCEDURE — 99406 PT REFUSED TOBACCO CESSATION: ICD-10-PCS | Mod: S$GLB,,,

## 2022-04-14 PROCEDURE — 63600175 PHARM REV CODE 636 W HCPCS

## 2022-04-14 PROCEDURE — 99900035 HC TECH TIME PER 15 MIN (STAT)

## 2022-04-14 PROCEDURE — 63600175 PHARM REV CODE 636 W HCPCS: Performed by: HOSPITALIST

## 2022-04-14 PROCEDURE — 25000003 PHARM REV CODE 250: Performed by: HOSPITALIST

## 2022-04-14 PROCEDURE — 25000003 PHARM REV CODE 250: Performed by: PHYSICIAN ASSISTANT

## 2022-04-14 PROCEDURE — 96372 THER/PROPH/DIAG INJ SC/IM: CPT | Performed by: HOSPITALIST

## 2022-04-14 PROCEDURE — 99231 SBSQ HOSP IP/OBS SF/LOW 25: CPT | Mod: ,,, | Performed by: PODIATRIST

## 2022-04-14 PROCEDURE — 25000003 PHARM REV CODE 250: Performed by: PSYCHIATRY & NEUROLOGY

## 2022-04-14 PROCEDURE — 80053 COMPREHEN METABOLIC PANEL: CPT | Performed by: HOSPITALIST

## 2022-04-14 PROCEDURE — 25000003 PHARM REV CODE 250: Performed by: NURSE PRACTITIONER

## 2022-04-14 PROCEDURE — 11000001 HC ACUTE MED/SURG PRIVATE ROOM

## 2022-04-14 PROCEDURE — 25000003 PHARM REV CODE 250

## 2022-04-14 PROCEDURE — 82607 VITAMIN B-12: CPT | Performed by: HOSPITALIST

## 2022-04-14 PROCEDURE — 96372 THER/PROPH/DIAG INJ SC/IM: CPT

## 2022-04-14 PROCEDURE — G0378 HOSPITAL OBSERVATION PER HR: HCPCS

## 2022-04-14 RX ORDER — INSULIN ASPART 100 [IU]/ML
1-10 INJECTION, SOLUTION INTRAVENOUS; SUBCUTANEOUS
Status: DISCONTINUED | OUTPATIENT
Start: 2022-04-14 | End: 2022-04-26 | Stop reason: HOSPADM

## 2022-04-14 RX ORDER — MAGNESIUM SULFATE HEPTAHYDRATE 40 MG/ML
2 INJECTION, SOLUTION INTRAVENOUS ONCE
Status: COMPLETED | OUTPATIENT
Start: 2022-04-14 | End: 2022-04-14

## 2022-04-14 RX ORDER — MICONAZOLE NITRATE 2 %
POWDER (GRAM) TOPICAL 2 TIMES DAILY
Status: DISCONTINUED | OUTPATIENT
Start: 2022-04-14 | End: 2022-04-26 | Stop reason: HOSPADM

## 2022-04-14 RX ORDER — ENOXAPARIN SODIUM 100 MG/ML
1 INJECTION SUBCUTANEOUS
Status: DISCONTINUED | OUTPATIENT
Start: 2022-04-14 | End: 2022-04-18

## 2022-04-14 RX ORDER — LANOLIN ALCOHOL/MO/W.PET/CERES
1 CREAM (GRAM) TOPICAL DAILY
Status: DISCONTINUED | OUTPATIENT
Start: 2022-04-14 | End: 2022-04-26 | Stop reason: HOSPADM

## 2022-04-14 RX ORDER — AMOXICILLIN 250 MG
1 CAPSULE ORAL DAILY PRN
Status: DISCONTINUED | OUTPATIENT
Start: 2022-04-14 | End: 2022-04-26 | Stop reason: HOSPADM

## 2022-04-14 RX ADMIN — GABAPENTIN 300 MG: 300 CAPSULE ORAL at 09:04

## 2022-04-14 RX ADMIN — RISPERIDONE 2 MG: 1 TABLET, ORALLY DISINTEGRATING ORAL at 09:04

## 2022-04-14 RX ADMIN — MICONAZOLE NITRATE 2 % TOPICAL POWDER: at 02:04

## 2022-04-14 RX ADMIN — FAMOTIDINE 20 MG: 20 TABLET ORAL at 09:04

## 2022-04-14 RX ADMIN — CLINDAMYCIN IN 5 PERCENT DEXTROSE 600 MG: 12 INJECTION, SOLUTION INTRAVENOUS at 03:04

## 2022-04-14 RX ADMIN — ASPIRIN 81 MG CHEWABLE TABLET 81 MG: 81 TABLET CHEWABLE at 09:04

## 2022-04-14 RX ADMIN — CLINDAMYCIN IN 5 PERCENT DEXTROSE 600 MG: 12 INJECTION, SOLUTION INTRAVENOUS at 09:04

## 2022-04-14 RX ADMIN — PRAVASTATIN SODIUM 40 MG: 40 TABLET ORAL at 10:04

## 2022-04-14 RX ADMIN — ENOXAPARIN SODIUM 100 MG: 100 INJECTION SUBCUTANEOUS at 09:04

## 2022-04-14 RX ADMIN — INSULIN ASPART 2 UNITS: 100 INJECTION, SOLUTION INTRAVENOUS; SUBCUTANEOUS at 10:04

## 2022-04-14 RX ADMIN — MAGNESIUM SULFATE 2 G: 2 INJECTION INTRAVENOUS at 09:04

## 2022-04-14 RX ADMIN — HYDROXYZINE PAMOATE 50 MG: 25 CAPSULE ORAL at 10:04

## 2022-04-14 RX ADMIN — DIVALPROEX SODIUM 1000 MG: 250 TABLET, DELAYED RELEASE ORAL at 09:04

## 2022-04-14 RX ADMIN — INSULIN ASPART 2 UNITS: 100 INJECTION, SOLUTION INTRAVENOUS; SUBCUTANEOUS at 12:04

## 2022-04-14 RX ADMIN — FLUTICASONE FUROATE AND VILANTEROL TRIFENATATE 1 PUFF: 100; 25 POWDER RESPIRATORY (INHALATION) at 07:04

## 2022-04-14 RX ADMIN — NICOTINE 1 PATCH: 21 PATCH, EXTENDED RELEASE TRANSDERMAL at 09:04

## 2022-04-14 RX ADMIN — FUROSEMIDE 20 MG: 20 TABLET ORAL at 09:04

## 2022-04-14 RX ADMIN — MICONAZOLE NITRATE 2 % TOPICAL POWDER: at 10:04

## 2022-04-14 RX ADMIN — FERROUS SULFATE TAB EC 325 MG (65 MG FE EQUIVALENT) 1 EACH: 325 (65 FE) TABLET DELAYED RESPONSE at 09:04

## 2022-04-14 RX ADMIN — DIVALPROEX SODIUM 500 MG: 250 TABLET, DELAYED RELEASE ORAL at 09:04

## 2022-04-14 RX ADMIN — ACETAMINOPHEN 650 MG: 325 TABLET ORAL at 07:04

## 2022-04-14 RX ADMIN — CLINDAMYCIN IN 5 PERCENT DEXTROSE 600 MG: 12 INJECTION, SOLUTION INTRAVENOUS at 11:04

## 2022-04-14 NOTE — ASSESSMENT & PLAN NOTE
Xray, MRI ordered  LLE ulcer debrided with deep cultures taken. Site dressed with xeroform and kerlix. Nursing orders in for daily dressing changes. She is NWB to E due to ulcer and history of charcot foot.  Antibiotics per hospital medicine  Will follow

## 2022-04-14 NOTE — PROGRESS NOTES
[facility] - Telemetry  Podiatry  Progress Note    Patient Name: Audrey Natarajan  MRN: [id number]  Admission Date: 4/12/2022  Hospital Length of Stay: 0 days  Attending Physician: Lorena Ferguson MD  Primary Care Provider: Donaldo Pena MD     Subjective:     Interval History: Vitals stable. No new pedal complaints. Patient requesting to take a shower. Dressings with bloody strikethrough, partially removed from feet.       Scheduled Meds:   aspirin  81 mg Oral Daily    clindamycin (CLEOCIN) IVPB  600 mg Intravenous Q8H    divalproex  1,000 mg Oral QHS    divalproex  500 mg Oral Daily    enoxaparin  1 mg/kg Subcutaneous Q12H    famotidine  20 mg Oral BID    ferrous sulfate  1 tablet Oral Daily    fluticasone furoate-vilanteroL  1 puff Inhalation Daily    furosemide  20 mg Oral Daily    gabapentin  300 mg Oral BID    miconazole NITRATE 2 %   Topical (Top) BID    nicotine  1 patch Transdermal Daily    pravastatin  40 mg Oral QHS    risperiDONE  2 mg Oral BID     Continuous Infusions:  PRN Meds:acetaminophen, albuterol-ipratropium, dextrose 10%, dextrose 10%, glucagon (human recombinant), glucose, glucose, hydrOXYzine pamoate, insulin aspart U-100, melatonin, naloxone, ondansetron, senna-docusate 8.6-50 mg, sodium chloride 0.9%    Review of Systems   Constitutional:  Negative for chills, diaphoresis, fatigue and fever.   Respiratory:  Negative for cough and shortness of breath.    Cardiovascular:  Negative for chest pain and leg swelling.   Gastrointestinal:  Negative for nausea and vomiting.   Musculoskeletal:  Positive for arthralgias and joint swelling. Negative for back pain and gait problem.   Skin:  Positive for color change and wound. Negative for pallor and rash.   Neurological:  Positive for numbness. Negative for tremors, speech difficulty and weakness.   Psychiatric/Behavioral:  Negative for agitation. The patient is not nervous/anxious.    Objective:     Vital Signs (Most Recent):  Temp: 98.8 °F  (37.1 °C) (04/14/22 1152)  Pulse: 100 (04/14/22 1157)  Resp: 20 (04/14/22 1152)  BP: 132/62 (04/14/22 1152)  SpO2: (!) 94 % (04/14/22 1157)   Vital Signs (24h Range):  Temp:  [96.6 °F (35.9 °C)-99.7 °F (37.6 °C)] 98.8 °F (37.1 °C)  Pulse:  [] 100  Resp:  [18-20] 20  SpO2:  [91 %-96 %] 94 %  BP: (124-144)/(61-67) 132/62     Weight: 107.8 kg (237 lb 10.5 oz)  Body mass index is 39.55 kg/m².    Foot Exam    General  General Appearance: appears stated age and healthy   Orientation: alert and oriented to person, place, and time   Affect: appropriate       Right Foot/Ankle     Inspection and Palpation  Ecchymosis: none  Tenderness: none   Swelling: none   Skin Exam: ulcer;     Neurovascular  Dorsalis pedis: 1+  Posterior tibial: 1+  Saphenous nerve sensation: diminished  Tibial nerve sensation: diminished  Superficial peroneal nerve sensation: diminished  Deep peroneal nerve sensation: diminished  Sural nerve sensation: diminished      Left Foot/Ankle      Inspection and Palpation  Ecchymosis: none  Tenderness: none   Swelling: dorsum   Skin Exam: cellulitis and ulcer;     Neurovascular  Dorsalis pedis: 1+  Posterior tibial: 1+  Saphenous nerve sensation: diminished  Tibial nerve sensation: diminished  Superficial peroneal nerve sensation: diminished  Deep peroneal nerve sensation: diminished  Sural nerve sensation: diminished        Laboratory:  Blood Cultures:   Recent Labs   Lab 04/13/22  0309   LABBLOO No Growth to date  No Growth to date  No Growth to date  No Growth to date     CBC:   Recent Labs   Lab 04/13/22 0028   WBC 31.22*   RBC 3.56*   HGB 9.9*   HCT 30.5*   *   MCV 86   MCH 27.8   MCHC 32.5     CMP:   Recent Labs   Lab 04/13/22 0028   *   CALCIUM 9.2   ALBUMIN 3.1*   PROT 5.8*      K 4.6   CO2 24   CL 99   BUN 15   CREATININE 0.6   ALKPHOS 75   ALT 28   AST 16   BILITOT 0.3     CRP: No results for input(s): CRP in the last 168 hours.  ESR: No results for input(s): SEDRATE in  the last 168 hours.  Microbiology Results (last 7 days)       Procedure Component Value Units Date/Time    Aerobic culture [[id number]] Collected: 04/13/22 1234    Order Status: Completed Specimen: Wound from Foot, Left Updated: 04/14/22 1254     Aerobic Bacterial Culture Insufficient incubation, culture in progress    Blood culture [[id number]] Collected: 04/13/22 0309    Order Status: Completed Specimen: Blood from Peripheral, Antecubital, Left Updated: 04/14/22 1212     Blood Culture, Routine No Growth to date      No Growth to date    Blood culture [[id number]] Collected: 04/13/22 0309    Order Status: Completed Specimen: Blood from Peripheral, Antecubital, Left Updated: 04/14/22 1212     Blood Culture, Routine No Growth to date      No Growth to date    Culture, Anaerobe [[id number]] Collected: 04/13/22 1234    Order Status: Completed Specimen: Wound from Foot, Left Updated: 04/14/22 0714     Anaerobic Culture Culture in progress    Culture, Anaerobe [[id number]]     Order Status: No result Specimen: Wound     Aerobic culture [[id number]]     Order Status: No result Specimen: Wound           Specimen (24h ago, onward)                None              Clinical Findings:      LLE with erythema and edema noted. Ulcer to plantar left midfoot with periwound hyperkeratosis and dry skin. Fibronecrotic base. Appears to extend to dermis. Does not probe to bone. No fluctuance or crepitus noted.         Ulcer to plantar right distal forefoot and plantar right hallux, to dermis, with fibrogranular base. No purulence or acute signs of infection.        Assessment/Plan:     Diabetic foot ulcer associated with type 2 diabetes mellitus  Plan  -No concern for deep infection. Antibiotic plan for cellulitis per hospital medicine.   -Will monitor CBC.   -Dressing changes by nursing, ordered. Patient can shower at dressing changes.   -Protected WB RLE in surgical shoe. NWB LLE when possible due to ulcer and Charcot, heel WB for  transfers.   -Podiatry will follow    Psychosis  Per hosptial medicine    Cellulitis of lower extremity  Per hosptial medicine        Arcadio Alvarado DPM PGY-2  Podiatric Medicine & Surgery  [facility]  Secure Chat Preferred  Mobile: [phone]  Pager: [phone]

## 2022-04-14 NOTE — ASSESSMENT & PLAN NOTE
Hx of DVT/PE, hypercoagulable state, IVC filter in place  BLE venous US with thrombosis of bilateral posterior tibial veins    - Continue therapeutic lovenox

## 2022-04-14 NOTE — NURSING
Patient is awake and orientedx4. Care plan explained to patient; verbalized understanding. CEC order in place, one on one sitter utilized. On room air, O2 saturation at 91-96%. Hooked to heart monitor, running normal sinus rhythm at 89-99bpm. Up to bathroom with assistance from staff. No N/V/D during shift. C/O pain to BLE, Chairs, NP notified; one time does of Tramadol 50mg ordered and administered with relief. Due medications given. Encouraged to turn every 2 hours as tolerated. Maintained on fall risk precautions. Bed in lowest position, bed alarm on, call light/personal items within reach and instructed to call for help when needed.

## 2022-04-14 NOTE — ASSESSMENT & PLAN NOTE
Plan  -No concern for deep infection. Antibiotic plan for cellulitis per hospital medicine.   -Will monitor CBC.   -Dressing changes by nursing, ordered. Patient can shower at dressing changes.   -Protected WB RLE in surgical shoe. NWB LLE when possible due to ulcer and Charcot, heel WB for transfers.   -Podiatry will follow

## 2022-04-14 NOTE — ASSESSMENT & PLAN NOTE
B/l foot ulcers  - Podiatry consulted,  - Xray in feet bilaterally, findings consistent with Charcot joint of left foot, no acute abnormality   - MRI ordered edema noted as well as charcot changes of left foot without evidence of osteomyelitis  - [facility] ulcer debrided with deep cultures taken.   - Site dressed with xeroform and kerlix. Nursing orders in for daily dressing changes  - She is NWB to [facility] due to ulcer and history of charcot foot.  - Will continue to follow  - Cont clindamycin

## 2022-04-14 NOTE — SUBJECTIVE & OBJECTIVE
Scheduled Meds:   aspirin  81 mg Oral Daily    clindamycin (CLEOCIN) IVPB  600 mg Intravenous Q8H    divalproex  1,000 mg Oral QHS    [START ON 4/14/2022] divalproex  500 mg Oral Daily    enoxaparin  1 mg/kg Subcutaneous Q12H    famotidine  20 mg Oral BID    fluticasone furoate-vilanteroL  1 puff Inhalation Daily    furosemide  20 mg Oral Daily    gabapentin  300 mg Oral BID    nicotine  1 patch Transdermal Daily    pravastatin  40 mg Oral QHS    risperiDONE  2 mg Oral BID     Continuous Infusions:  PRN Meds:acetaminophen, albuterol-ipratropium, dextrose 10%, dextrose 10%, glucagon (human recombinant), glucose, glucose, hydrOXYzine pamoate, insulin aspart U-100, melatonin, naloxone, ondansetron, sodium chloride 0.9%    Review of patient's allergies indicates:   Allergen Reactions    Morphine Other (See Comments)     Patient had a psychotic episode after taking Morphine  Agitation, hallucinations    Penicillins Anaphylaxis     itching    Januvia [sitagliptin] Hives        Past Medical History:   Diagnosis Date    ADHD (attention deficit hyperactivity disorder)     Arthritis     Asthma     Bipolar 1 disorder     Cataract     COPD (chronic obstructive pulmonary disease)     Coronary artery disease     A fib    Depression     bipolar manic depresson    Diabetes mellitus     DVT of lower extremity, bilateral July 2013    bilateral LE DVT. [place] filter placed.     Encounter for blood transfusion     History of blood clots 1. Left Leg=2003; 2.Bilateral Groin=Blood Clots= 5 or 6/ 2013 & 7/2013; 3. LLL of Lung=7/2013;  4. Lt. Lower Leg=7/2013.     Pt. had 1st Blood Clot after Nbftdailkomn=4666, & Last=2013. [place] Filter= Rt.Lateral Neck.    HTN (hypertension) 6/6/2013    Pt states that she does not have hypertension    Hypercholesteremia     Irregular heartbeat     Neuromuscular disorder     neuropathy feet    PE (pulmonary embolism) July 2013     bilat LE DVT.     Restless leg syndrome      Past Surgical History:  "  Procedure Laterality Date    ABDOMINAL SURGERY  2010    gastric sleeve    BILATERAL OOPHORECTOMY Bilateral 2015    CHOLECYSTECTOMY      Green' s filter Right 2012    Right Neck & Tunneled Down.    HERNIA REPAIR      "[place] of Hernias Repaires around th [place].", pt. states    LAPAROSCOPIC CHOLECYSTECTOMY N/A 9/10/2020    Procedure: CHOLECYSTECTOMY, LAPAROSCOPIC;  Surgeon: Montrell Gutierrez MD;  Location: [facility];  Service: General;  Laterality: N/A;  RN PREOP ----COVID Negative      OVARIAN CYST REMOVAL  3/13/2014    SD REMOVAL OF OVARY/TUBE(S)      SPLENECTOMY, TOTAL  2003    TONSILLECTOMY      as a child    TYMPANOSTOMY TUBE PLACEMENT      VEIN SURGERY      Lt leg       Family History       Problem Relation (Age of Onset)    Cataracts Father    Diabetes Father, Paternal Grandfather    Heart disease Father, Paternal Grandfather    Hypertension Father    No Known Problems Mother, Sister, Brother, Maternal Aunt, Maternal Uncle, Paternal Aunt, Paternal Uncle, Maternal Grandfather    Ovarian cancer Maternal Grandmother, Paternal Grandmother          Tobacco Use    Smoking status: Current Every Day Smoker     Packs/day: 1.00     Years: 37.00     Pack years: 37.00     Types: Cigarettes     Last attempt to quit: 2020     Years since quittin.3    Smokeless tobacco: Never Used    Tobacco comment: Enrolled in the [email or web address] on 5/3/14 ([facility] Member ID # [id number]). Ambulatory referral to Smoking Cessation Program   Substance and Sexual Activity    Alcohol use: No     Alcohol/week: 0.0 standard drinks    Drug use: No    Sexual activity: Yes     Partners: Male     Review of Systems   Constitutional:  Negative for chills, diaphoresis, fatigue and fever.   Respiratory:  Negative for cough and shortness of breath.    Cardiovascular:  Negative for chest pain and leg swelling.   Gastrointestinal:  Negative for nausea and vomiting.   Musculoskeletal:  Positive for arthralgias and joint " swelling. Negative for back pain and gait problem.   Skin:  Positive for color change and wound. Negative for pallor and rash.   Neurological:  Positive for numbness. Negative for tremors, speech difficulty and weakness.   Psychiatric/Behavioral:  Negative for agitation. The patient is not nervous/anxious.    Objective:     Vital Signs (Most Recent):  Temp: 98 °F (36.7 °C) (04/13/22 1620)  Pulse: 100 (04/13/22 1620)  Resp: 20 (04/13/22 1620)  BP: 133/64 (04/13/22 1620)  SpO2: (!) 92 % (04/13/22 1621)   Vital Signs (24h Range):  Temp:  [96.6 °F (35.9 °C)-99.1 °F (37.3 °C)] 98 °F (36.7 °C)  Pulse:  [] 100  Resp:  [16-20] 20  SpO2:  [92 %-98 %] 92 %  BP: (107-150)/(49-65) 133/64     Weight: 109.9 kg (242 lb 4.6 oz)  Body mass index is 40.32 kg/m².    Foot Exam    General  General Appearance: appears stated age and healthy   Orientation: alert and oriented to person, place, and time   Affect: appropriate       Right Foot/Ankle     Inspection and Palpation  Ecchymosis: none  Tenderness: none   Swelling: none   Skin Exam: ulcer;     Neurovascular  Dorsalis pedis: 1+  Posterior tibial: 1+  Saphenous nerve sensation: diminished  Tibial nerve sensation: diminished  Superficial peroneal nerve sensation: diminished  Deep peroneal nerve sensation: diminished  Sural nerve sensation: diminished      Left Foot/Ankle      Inspection and Palpation  Ecchymosis: none  Tenderness: none   Swelling: dorsum   Skin Exam: cellulitis and ulcer;     Neurovascular  Dorsalis pedis: 1+  Posterior tibial: 1+  Saphenous nerve sensation: diminished  Tibial nerve sensation: diminished  Superficial peroneal nerve sensation: diminished  Deep peroneal nerve sensation: diminished  Sural nerve sensation: diminished        Laboratory:  A1C:   Recent Labs   Lab 03/29/22  1032 04/13/22  0028   HGBA1C 7.2* 7.0*     Blood Cultures:   Recent Labs   Lab 04/13/22  0309   LABBLOO No Growth to date  No Growth to date     CBC:   Recent Labs   Lab  04/13/22 0028   WBC 31.22*   RBC 3.56*   HGB 9.9*   HCT 30.5*   *   MCV 86   MCH 27.8   MCHC 32.5     CMP:   Recent Labs   Lab 04/13/22 0028   *   CALCIUM 9.2   ALBUMIN 3.1*   PROT 5.8*      K 4.6   CO2 24   CL 99   BUN 15   CREATININE 0.6   ALKPHOS 75   ALT 28   AST 16   BILITOT 0.3     CRP: No results for input(s): CRP in the last 168 hours.  ESR: No results for input(s): SEDRATE in the last 168 hours.  Wound Cultures: No results for input(s): LABAERO in the last 4320 hours.  Microbiology Results (last 7 days)       Procedure Component Value Units Date/Time    Culture, Anaerobe [[id number]] Collected: 04/13/22 1234    Order Status: Sent Specimen: Wound from Foot, Left Updated: 04/13/22 1815    Aerobic culture [[id number]] Collected: 04/13/22 1234    Order Status: Sent Specimen: Wound from Foot, Left Updated: 04/13/22 1815    Blood culture [[id number]] Collected: 04/13/22 0309    Order Status: Completed Specimen: Blood from Peripheral, Antecubital, Left Updated: 04/13/22 1745     Blood Culture, Routine No Growth to date    Blood culture [[id number]] Collected: 04/13/22 0309    Order Status: Completed Specimen: Blood from Peripheral, Antecubital, Left Updated: 04/13/22 1745     Blood Culture, Routine No Growth to date    Culture, Anaerobe [[id number]]     Order Status: No result Specimen: Wound     Aerobic culture [[id number]]     Order Status: No result Specimen: Wound           Specimen (24h ago, onward)                None            Diagnostic Results:  MRI: I have reviewed all pertinent results/findings within the past 24 hours.  ordered  X-Ray: I have reviewed all pertinent results/findings within the past 24 hours.  ordered    Clinical Findings:  Ulcer to plantar right distal forefoot and plantar right hallux, superficial to fat layer, with fibronecrotic base. No purulence or acute signs of infection.      LLE with erythema and edema noted. Ulcer to plantar left midfoot with periwound  hyperkeratosis and dry skin. Upon debridement with fibronecrotic base. Appears to extend superficial to fat layer. Does not probe to bone. No fluctuance or crepitus noted.

## 2022-04-14 NOTE — ASSESSMENT & PLAN NOTE
Bipolar 1 disorder    -Continue CEC from behavioral health facility  -Consulted psych for eval and medication management  - Cont Depakote and risperidone

## 2022-04-14 NOTE — PROCEDURES
"Audrey Natarajan is a 51 y.o. female patient.    Temp: 98 °F (36.7 °C) (04/13/22 1620)  Pulse: 100 (04/13/22 1620)  Resp: 20 (04/13/22 1620)  BP: 133/64 (04/13/22 1620)  SpO2: (!) 92 % (04/13/22 1621)  Weight: 109.9 kg (242 lb 4.6 oz) (04/13/22 1147)  Height: 5' 5" (165.1 cm) (04/13/22 1147)       Debridement    Date/Time: 4/13/2022 7:54 PM  Performed by: Savanna Wilkins DPM  Authorized by: Savanna Wilkins DPM     Consent Done?:  Yes (Verbal)  Local anesthesia used?: No      Wound Details:    Location:  Left foot    Location:  Left Plantar    Type of Debridement:  Excisional       Length (cm):  5       Area (sq cm):  35       Width (cm):  7       Percent Debrided (%):  100       Depth (cm):  0.3       Total Area Debrided (sq cm):  35    Depth of debridement:  Subcutaneous tissue    Tissue debrided:  Subcutaneous and Other    Devitalized tissue debrided:  Biofilm, Callus and Fibrin    Instruments:  Blade and Curette    Bleeding:  Minimal  Patient tolerance:  Patient tolerated the procedure well with no immediate complications     cultures were taken during this visit         4/13/2022  "

## 2022-04-14 NOTE — SUBJECTIVE & OBJECTIVE
Interval History: VSS. SpO2 > 90% on RA. On IV Clindamycin, s/p debridement yesterday. Wound cultures pending. Podiatry and Psych following.  Blood work not collected until afternoon secondary to soft lab/epic issues.     Review of Systems   Constitutional:  Negative for appetite change and fever.   HENT:  Negative for congestion.    Respiratory:  Negative for cough and shortness of breath.    Cardiovascular:  Positive for leg swelling. Negative for chest pain.   Gastrointestinal:  Positive for constipation. Negative for abdominal pain, diarrhea, nausea and vomiting.   Genitourinary:  Negative for dysuria and hematuria.   Musculoskeletal:  Positive for arthralgias.   Skin:  Positive for color change and wound.   Neurological:  Negative for weakness and headaches.   Psychiatric/Behavioral:  Negative for agitation and confusion.    Objective:     Vital Signs (Most Recent):  Temp: 98.8 °F (37.1 °C) (04/14/22 1152)  Pulse: 100 (04/14/22 1157)  Resp: 20 (04/14/22 1152)  BP: 132/62 (04/14/22 1152)  SpO2: (!) 94 % (04/14/22 1516)   Vital Signs (24h Range):  Temp:  [96.6 °F (35.9 °C)-99.7 °F (37.6 °C)] 98.8 °F (37.1 °C)  Pulse:  [] 100  Resp:  [18-20] 20  SpO2:  [91 %-96 %] 94 %  BP: (124-144)/(61-67) 132/62     Weight: 107.8 kg (237 lb 10.5 oz)  Body mass index is 39.55 kg/m².    Intake/Output Summary (Last 24 hours) at 4/14/2022 1608  Last data filed at 4/14/2022 1447  Gross per 24 hour   Intake 1448.53 ml   Output 4075 ml   Net -2626.47 ml      Physical Exam  Vitals and nursing note reviewed.   Constitutional:       General: She is not in acute distress.     Appearance: She is obese. She is not toxic-appearing.   HENT:      Head: Normocephalic and atraumatic.      Nose: Nose normal.      Mouth/Throat:      Mouth: Mucous membranes are moist.   Eyes:      Pupils: Pupils are equal, round, and reactive to light.   Cardiovascular:      Rate and Rhythm: Regular rhythm. Tachycardia present.      Pulses: Normal pulses.       Heart sounds: Normal heart sounds.   Pulmonary:      Effort: Pulmonary effort is normal.      Breath sounds: Normal breath sounds.   Abdominal:      General: Bowel sounds are normal.      Palpations: Abdomen is soft.   Musculoskeletal:         General: Tenderness present. Normal range of motion.      Cervical back: Normal range of motion.      Right lower leg: No edema.      Left lower leg: No edema.   Skin:     General: Skin is warm and dry.      Comments: BLE warmth, erythema with improved edema.  Sloughing of skin on R foot and wound L midfoot   Neurological:      Mental Status: She is alert and oriented to person, place, and time.   Psychiatric:         Behavior: Behavior normal.         Thought Content: Thought content normal.             Significant Labs: All pertinent labs within the past 24 hours have been reviewed.  BMP:   Recent Labs   Lab 04/13/22  0306 04/14/22  1521   GLU  --  179*   NA  --  138   K  --  4.2   CL  --  96   CO2  --  30*   BUN  --  12   CREATININE  --  0.7   CALCIUM  --  10.4   MG 1.4*  --      CBC:   Recent Labs   Lab 04/13/22  0028   WBC 31.22*   HGB 9.9*   HCT 30.5*   *     CMP:   Recent Labs   Lab 04/13/22  0028 04/14/22  1521    138   K 4.6 4.2   CL 99 96   CO2 24 30*   * 179*   BUN 15 12   CREATININE 0.6 0.7   CALCIUM 9.2 10.4   PROT 5.8* 7.3   ALBUMIN 3.1* 3.2*   BILITOT 0.3 0.2   ALKPHOS 75 100   AST 16 40   ALT 28 67*   ANIONGAP 14 12   EGFRNONAA >60 >60       Significant Imaging: I have reviewed all pertinent imaging results/findings within the past 24 hours.

## 2022-04-14 NOTE — ASSESSMENT & PLAN NOTE
Denies bleeding, baseline 12-13, 9.9 on admit  Iron panel: Iron 13, TIBC 456, Sat Iron 3, Transferrin and Ferritin WNL    - Iron supplement  - Continue to monitor with daily CBC

## 2022-04-14 NOTE — HPI
Pt is a 50 y/o female  has a past medical history of ADHD (attention deficit hyperactivity disorder), Arthritis, Asthma, Bipolar 1 disorder, Cataract, COPD (chronic obstructive pulmonary disease), Coronary artery disease, Depression, Diabetes mellitus, DVT of lower extremity, bilateral, Encounter for blood transfusion, History of blood clots, HTN (hypertension), Hypercholesteremia, Irregular heartbeat, Neuromuscular disorder, PE (pulmonary embolism), and Restless leg syndrome. Admitted for DVT and cellulitis. Consulted to podiatry for bilateral foot wounds. History of left foot charcot with history of wounds to plantar left foot. Relates to pain. No other pedal complaints.

## 2022-04-14 NOTE — CONSULTS
[facility] - Telemetry  Podiatry  Consult Note    Patient Name: Audrey Natarajan  MRN: [id number]  Admission Date: 4/12/2022  Hospital Length of Stay: 0 days  Attending Physician: Lorena Ferguson MD  Primary Care Provider: Donaldo Pena MD     Inpatient consult to Podiatry  Consult performed by: Savanna Wilkins DPM  Consult ordered by: Lizeth Briseno NP        Subjective:     History of Present Illness:  Pt is a 52 y/o female  has a past medical history of ADHD (attention deficit hyperactivity disorder), Arthritis, Asthma, Bipolar 1 disorder, Cataract, COPD (chronic obstructive pulmonary disease), Coronary artery disease, Depression, Diabetes mellitus, DVT of lower extremity, bilateral, Encounter for blood transfusion, History of blood clots, HTN (hypertension), Hypercholesteremia, Irregular heartbeat, Neuromuscular disorder, PE (pulmonary embolism), and Restless leg syndrome. Admitted for DVT and cellulitis. Consulted to podiatry for bilateral foot wounds. History of left foot charcot with history of wounds to plantar left foot. Relates to pain. No other pedal complaints.       Scheduled Meds:   aspirin  81 mg Oral Daily    clindamycin (CLEOCIN) IVPB  600 mg Intravenous Q8H    divalproex  1,000 mg Oral QHS    [START ON 4/14/2022] divalproex  500 mg Oral Daily    enoxaparin  1 mg/kg Subcutaneous Q12H    famotidine  20 mg Oral BID    fluticasone furoate-vilanteroL  1 puff Inhalation Daily    furosemide  20 mg Oral Daily    gabapentin  300 mg Oral BID    nicotine  1 patch Transdermal Daily    pravastatin  40 mg Oral QHS    risperiDONE  2 mg Oral BID     Continuous Infusions:  PRN Meds:acetaminophen, albuterol-ipratropium, dextrose 10%, dextrose 10%, glucagon (human recombinant), glucose, glucose, hydrOXYzine pamoate, insulin aspart U-100, melatonin, naloxone, ondansetron, sodium chloride 0.9%    Review of patient's allergies indicates:   Allergen Reactions    Morphine Other (See Comments)      "Patient had a psychotic episode after taking Morphine  Agitation, hallucinations    Penicillins Anaphylaxis     itching    Januvia [sitagliptin] Hives        Past Medical History:   Diagnosis Date    ADHD (attention deficit hyperactivity disorder)     Arthritis     Asthma     Bipolar 1 disorder     Cataract     COPD (chronic obstructive pulmonary disease)     Coronary artery disease     A fib    Depression     bipolar manic depresson    Diabetes mellitus     DVT of lower extremity, bilateral July 2013    bilateral LE DVT. [place] filter placed.     Encounter for blood transfusion     History of blood clots 1. Left Leg=2003; 2.Bilateral Groin=Blood Clots= 5 or 6/ 2013 & 7/2013; 3. LLL of Lung=7/2013;  4. Lt. Lower Leg=7/2013.     Pt. had 1st Blood Clot after Iejxbqizacfu=9044, & Last=2013. [place] Filter= Rt.Lateral Neck.    HTN (hypertension) 6/6/2013    Pt states that she does not have hypertension    Hypercholesteremia     Irregular heartbeat     Neuromuscular disorder     neuropathy feet    PE (pulmonary embolism) July 2013     bilat LE DVT.     Restless leg syndrome      Past Surgical History:   Procedure Laterality Date    ABDOMINAL SURGERY  2010    gastric sleeve    BILATERAL OOPHORECTOMY Bilateral 1/12/2015    CHOLECYSTECTOMY      Green' [place] filter Right 7/4/2012    Right Neck & Tunneled Down.    HERNIA REPAIR      "[place] of Hernias Repaires around th [place].", pt. states    LAPAROSCOPIC CHOLECYSTECTOMY N/A 9/10/2020    Procedure: CHOLECYSTECTOMY, LAPAROSCOPIC;  Surgeon: Montrell Gutierrez MD;  Location: [facility];  Service: General;  Laterality: N/A;  RN PREOP 9/9----COVID Negative  9/9    OVARIAN CYST REMOVAL  3/13/2014    NE REMOVAL OF OVARY/TUBE(S)      SPLENECTOMY, TOTAL  July 2003    TONSILLECTOMY      as a child    TYMPANOSTOMY TUBE PLACEMENT  1976    VEIN SURGERY  2003    Lt leg       Family History       Problem Relation (Age of Onset)    Cataracts Father    " Diabetes Father, Paternal Grandfather    Heart disease Father, Paternal Grandfather    Hypertension Father    No Known Problems Mother, Sister, Brother, Maternal Aunt, Maternal Uncle, Paternal Aunt, Paternal Uncle, Maternal Grandfather    Ovarian cancer Maternal Grandmother, Paternal Grandmother          Tobacco Use    Smoking status: Current Every Day Smoker     Packs/day: 1.00     Years: 37.00     Pack years: 37.00     Types: Cigarettes     Last attempt to quit: 2020     Years since quittin.3    Smokeless tobacco: Never Used    Tobacco comment: Enrolled in the [employer] on 5/3/14 ([facility] Member ID # [id number]). Ambulatory referral to Smoking Cessation Program   Substance and Sexual Activity    Alcohol use: No     Alcohol/week: 0.0 standard drinks    Drug use: No    Sexual activity: Yes     Partners: Male     Review of Systems   Constitutional:  Negative for chills, diaphoresis, fatigue and fever.   Respiratory:  Negative for cough and shortness of breath.    Cardiovascular:  Negative for chest pain and leg swelling.   Gastrointestinal:  Negative for nausea and vomiting.   Musculoskeletal:  Positive for arthralgias and joint swelling. Negative for back pain and gait problem.   Skin:  Positive for color change and wound. Negative for pallor and rash.   Neurological:  Positive for numbness. Negative for tremors, speech difficulty and weakness.   Psychiatric/Behavioral:  Negative for agitation. The patient is not nervous/anxious.    Objective:     Vital Signs (Most Recent):  Temp: 98 °F (36.7 °C) (22 162)  Pulse: 100 (22 162)  Resp: 20 (22 162)  BP: 133/64 (220)  SpO2: (!) 92 % (22)   Vital Signs (24h Range):  Temp:  [96.6 °F (35.9 °C)-99.1 °F (37.3 °C)] 98 °F (36.7 °C)  Pulse:  [] 100  Resp:  [16-20] 20  SpO2:  [92 %-98 %] 92 %  BP: (107-150)/(49-65) 133/64     Weight: 109.9 kg (242 lb 4.6 oz)  Body mass index is 40.32 kg/m².    Foot  Exam    General  General Appearance: appears stated age and healthy   Orientation: alert and oriented to person, place, and time   Affect: appropriate       Right Foot/Ankle     Inspection and Palpation  Ecchymosis: none  Tenderness: none   Swelling: none   Skin Exam: ulcer;     Neurovascular  Dorsalis pedis: 1+  Posterior tibial: 1+  Saphenous nerve sensation: diminished  Tibial nerve sensation: diminished  Superficial peroneal nerve sensation: diminished  Deep peroneal nerve sensation: diminished  Sural nerve sensation: diminished      Left Foot/Ankle      Inspection and Palpation  Ecchymosis: none  Tenderness: none   Swelling: dorsum   Skin Exam: cellulitis and ulcer;     Neurovascular  Dorsalis pedis: 1+  Posterior tibial: 1+  Saphenous nerve sensation: diminished  Tibial nerve sensation: diminished  Superficial peroneal nerve sensation: diminished  Deep peroneal nerve sensation: diminished  Sural nerve sensation: diminished        Laboratory:  A1C:   Recent Labs   Lab 03/29/22  1032 04/13/22 0028   HGBA1C 7.2* 7.0*     Blood Cultures:   Recent Labs   Lab 04/13/22  0309   LABBLOO No Growth to date  No Growth to date     CBC:   Recent Labs   Lab 04/13/22 0028   WBC 31.22*   RBC 3.56*   HGB 9.9*   HCT 30.5*   *   MCV 86   MCH 27.8   MCHC 32.5     CMP:   Recent Labs   Lab 04/13/22 0028   *   CALCIUM 9.2   ALBUMIN 3.1*   PROT 5.8*      K 4.6   CO2 24   CL 99   BUN 15   CREATININE 0.6   ALKPHOS 75   ALT 28   AST 16   BILITOT 0.3     CRP: No results for input(s): CRP in the last 168 hours.  ESR: No results for input(s): SEDRATE in the last 168 hours.  Wound Cultures: No results for input(s): LABAERO in the last 4320 hours.  Microbiology Results (last 7 days)       Procedure Component Value Units Date/Time    Culture, Anaerobe [[id number]] Collected: 04/13/22 1234    Order Status: Sent Specimen: Wound from Foot, Left Updated: 04/13/22 1815    Aerobic culture [[id number]] Collected: 04/13/22 1234     Order Status: Sent Specimen: Wound from Foot, Left Updated: 04/13/22 1815    Blood culture [[id number]] Collected: 04/13/22 0309    Order Status: Completed Specimen: Blood from Peripheral, Antecubital, Left Updated: 04/13/22 1745     Blood Culture, Routine No Growth to date    Blood culture [[id number]] Collected: 04/13/22 0309    Order Status: Completed Specimen: Blood from Peripheral, Antecubital, Left Updated: 04/13/22 1745     Blood Culture, Routine No Growth to date    Culture, Anaerobe [[id number]]     Order Status: No result Specimen: Wound     Aerobic culture [[id number]]     Order Status: No result Specimen: Wound           Specimen (24h ago, onward)                None            Diagnostic Results:  MRI: I have reviewed all pertinent results/findings within the past 24 hours.  ordered  X-Ray: I have reviewed all pertinent results/findings within the past 24 hours.  ordered    Clinical Findings:  Ulcer to plantar right distal forefoot and plantar right hallux, superficial to fat layer, with fibronecrotic base. No purulence or acute signs of infection.      LLE with erythema and edema noted. Ulcer to plantar left midfoot with periwound hyperkeratosis and dry skin. Upon debridement with fibronecrotic base. Appears to extend superficial to fat layer. Does not probe to bone. No fluctuance or crepitus noted.         Assessment/Plan:     * Acute deep vein thrombosis (DVT) of both lower extremities  Per hosptial medicine    Anemia  Per hosptial medicine    Diabetic foot ulcer associated with type 2 diabetes mellitus  Xray, MRI ordered  LLE ulcer debrided with deep cultures taken. Site dressed with xeroform and kerlix. Nursing orders in for daily dressing changes. She is NWB to LLE due to ulcer and history of charcot foot.  Antibiotics per hospital medicine  Will follow    SHAWN (obstructive sleep apnea)  Per hosptial medicine    Psychosis  Per hosptial medicine    Cellulitis of lower extremity  Per hosptial  medicine    Bipolar 1 disorder  Per hosptial medicine    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  Per hosptial medicine    COPD (chronic obstructive pulmonary disease)  Per hosptial medicine    Essential hypertension  Per hosptial medicine        Thank you for your consult. I will follow-up with patient. Please contact us if you have any additional questions.    Savanna Wilkins DPM  Podiatry  [facility]

## 2022-04-14 NOTE — ASSESSMENT & PLAN NOTE
WBC 31, lactic acid negative  Given vanc in ED, switched to clindamycin  -BC preliminary result NGTD  4/13 cellulitis is improving, difficult with b/l DVTs however will continue antibiotics with significant elevation of WBC    - Continue IV clindamycin  - Continue to monitor

## 2022-04-14 NOTE — PROGRESS NOTES
[facility] Medicine  Progress Note    Patient Name: Audrey Natarajan  MRN: [id number]  Patient Class: OP- Observation   Admission Date: 4/12/2022  Length of Stay: 0 days  Attending Physician: Lorena Ferguson MD  Primary Care Provider: Donaldo Pena MD        Subjective:     Principal Problem:Acute deep vein thrombosis (DVT) of both lower extremities        HPI:  Audrey Natarajan is a 52 yo female with a pmh of DM2, bipolar 1 disorder, cellulitis, COPD, HTN, CAD, DVT/PE. She presented to the ED with c/o fevers x 1 day. She also has BLE swelling and pain and wounds to both feet, worsening x 2 weeks. She had fever up to 102F yesterday. She reports the wounds to her right foot are from dragging her feet while she was angry. Denies drainage to foot wounds. She has had blood clots in the past and has an IVC filter placed in 2012. She was sent here from [facility] where she was under CEC for psychosis. ED workup revealed BLE DVT on venous US, WBC 31, and Hgb 9.9. She received a dose of vancomycin while in the ED.       Overview/Hospital Course:  Pt placed in observation for cellulitis and DVT.  Pt with h/o DVT/PE, hypercoagulable state, IVC filter in place. Therapeutic lovenox initiated.  Podiatry consulted for diabetic foot ulcer, debrided on 4/13 with cultures obtained. On IV Clindamycin. Psych consulted for medication evaluation, pt with CEC from behavioral health facility.       Interval History: VSS. SpO2 > 90% on RA. On IV Clindamycin, s/p debridement yesterday. Wound cultures pending. Podiatry and Psych following.  Blood work not collected until afternoon secondary to soft lab/epic issues.     Review of Systems   Constitutional:  Negative for appetite change and fever.   HENT:  Negative for congestion.    Respiratory:  Negative for cough and shortness of breath.    Cardiovascular:  Positive for leg swelling. Negative for chest pain.   Gastrointestinal:  Positive for constipation.  Negative for abdominal pain, diarrhea, nausea and vomiting.   Genitourinary:  Negative for dysuria and hematuria.   Musculoskeletal:  Positive for arthralgias.   Skin:  Positive for color change and wound.   Neurological:  Negative for weakness and headaches.   Psychiatric/Behavioral:  Negative for agitation and confusion.    Objective:     Vital Signs (Most Recent):  Temp: 98.8 °F (37.1 °C) (04/14/22 1152)  Pulse: 100 (04/14/22 1157)  Resp: 20 (04/14/22 1152)  BP: 132/62 (04/14/22 1152)  SpO2: (!) 94 % (04/14/22 1516)   Vital Signs (24h Range):  Temp:  [96.6 °F (35.9 °C)-99.7 °F (37.6 °C)] 98.8 °F (37.1 °C)  Pulse:  [] 100  Resp:  [18-20] 20  SpO2:  [91 %-96 %] 94 %  BP: (124-144)/(61-67) 132/62     Weight: 107.8 kg (237 lb 10.5 oz)  Body mass index is 39.55 kg/m².    Intake/Output Summary (Last 24 hours) at 4/14/2022 1608  Last data filed at 4/14/2022 1447  Gross per 24 hour   Intake 1448.53 ml   Output 4075 ml   Net -2626.47 ml      Physical Exam  Vitals and nursing note reviewed.   Constitutional:       General: She is not in acute distress.     Appearance: She is obese. She is not toxic-appearing.   HENT:      Head: Normocephalic and atraumatic.      Nose: Nose normal.      Mouth/Throat:      Mouth: Mucous membranes are moist.   Eyes:      Pupils: Pupils are equal, round, and reactive to light.   Cardiovascular:      Rate and Rhythm: Regular rhythm. Tachycardia present.      Pulses: Normal pulses.      Heart sounds: Normal heart sounds.   Pulmonary:      Effort: Pulmonary effort is normal.      Breath sounds: Normal breath sounds.   Abdominal:      General: Bowel sounds are normal.      Palpations: Abdomen is soft.   Musculoskeletal:         General: Tenderness present. Normal range of motion.      Cervical back: Normal range of motion.      Right lower leg: No edema.      Left lower leg: No edema.   Skin:     General: Skin is warm and dry.      Comments: BLE warmth, erythema with improved edema.   Sloughing of skin on R foot and wound L midfoot   Neurological:      Mental Status: She is alert and oriented to person, place, and time.   Psychiatric:         Behavior: Behavior normal.         Thought Content: Thought content normal.             Significant Labs: All pertinent labs within the past 24 hours have been reviewed.  BMP:   Recent Labs   Lab 04/13/22  0306 04/14/22  1521   GLU  --  179*   NA  --  138   K  --  4.2   CL  --  96   CO2  --  30*   BUN  --  12   CREATININE  --  0.7   CALCIUM  --  10.4   MG 1.4*  --      CBC:   Recent Labs   Lab 04/13/22  0028   WBC 31.22*   HGB 9.9*   HCT 30.5*   *     CMP:   Recent Labs   Lab 04/13/22  0028 04/14/22  1521    138   K 4.6 4.2   CL 99 96   CO2 24 30*   * 179*   BUN 15 12   CREATININE 0.6 0.7   CALCIUM 9.2 10.4   PROT 5.8* 7.3   ALBUMIN 3.1* 3.2*   BILITOT 0.3 0.2   ALKPHOS 75 100   AST 16 40   ALT 28 67*   ANIONGAP 14 12   EGFRNONAA >60 >60       Significant Imaging: I have reviewed all pertinent imaging results/findings within the past 24 hours.      Assessment/Plan:      * Acute deep vein thrombosis (DVT) of both lower extremities  Hx of DVT/PE, hypercoagulable state, IVC filter in place  BLE venous US with thrombosis of bilateral posterior tibial veins    - Continue therapeutic lovenox    Anemia  Denies bleeding, baseline 12-13, 9.9 on admit  Iron panel: Iron 13, TIBC 456, Sat Iron 3, Transferrin and Ferritin WNL    - Iron supplement  - Continue to monitor with daily CBC    Diabetic foot ulcer associated with type 2 diabetes mellitus  B/l foot ulcers  - Podiatry consulted,  - Xray in feet bilaterally, findings consistent with Charcot joint of left foot, no acute abnormality   - MRI ordered edema noted as well as charcot changes of left foot without evidence of osteomyelitis  - LLE ulcer debrided with deep cultures taken.   - Site dressed with xeroform and kerlix. Nursing orders in for daily dressing changes  - She is NWB to LLE due to  ulcer and history of charcot foot.  - Will continue to follow  - Cont clindamycin    SHAWN (obstructive sleep apnea)  Does not use CPAP    Psychosis  Bipolar 1 disorder    -Continue CEC from behavioral health facility  -Consulted psych for eval and medication management  - Cont Depakote and risperidone      Cellulitis of lower extremity  WBC 31, lactic acid negative  Given vanc in ED, switched to clindamycin  -BC preliminary result NGTD  4/13 cellulitis is improving, difficult with b/l DVTs however will continue antibiotics with significant elevation of WBC    - Continue IV clindamycin  - Continue to monitor    Bipolar 1 disorder  See psychosis    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  A1C 7.0  -Hold metformin  -accuchecks and LDSSI  -diabetic diet  -cont neurontin  Currently at goal for hospitalization    COPD (chronic obstructive pulmonary disease)  Cont advair inhaler  duonebs PRN    Essential hypertension  Hold home meds for now in setting of infection      VTE Risk Mitigation (From admission, onward)         Ordered     enoxaparin injection 100 mg  Every 12 hours (non-standard times)         04/14/22 0056     IP VTE HIGH RISK PATIENT  Once         04/13/22 0246     Place sequential compression device  Until discontinued         04/13/22 0246                Discharge Planning   HUMBERTO: 4/14/2022     Code Status: Full Code   Is the patient medically ready for discharge?:     Reason for patient still in hospital (select all that apply): Patient trending condition  Discharge Plan A: Psychiatric hospital   Discharge Delays: None known at this time              Aniya Ramirez PA-C  Department of Hospital Medicine   [place] [facility]

## 2022-04-14 NOTE — PROGRESS NOTES
Individual Follow-Up Form    4/14/2022    Quit Date: To be determined    Clinical Status of Patient: Inpatient    Length of Service: 15 minutes    Comments: Smoking cessation education follow-up: Pt states nicotine patch is helping. She would like to use nicotine gum and/or lozenges as well, but understands that those medications are not available through inpatient pharmacy. Pt states that she will discuss with CTTS during her smoking cessation clinic appointment. Reviewed details of pt's upcoming Ambulatory Smoking Cessation appointment and she states no conflicts with appointment date and time.    Diagnosis: F17.210    Next Visit:  4/28/22 at 2:30 pm with [facility]

## 2022-04-14 NOTE — PLAN OF CARE
TN went to meet with patient. We discussed discharge planning. Patient is still under CEC. She is aware will have to discharge to IP PSYCH. Patient came from [facility] IP Psych in [place]. I told her once ready for discharge, there is a centralized psych placement based on her needs/beds. Patient verbalized understanding. She gave me permission to call her sister Anitra to update as well. TN spoke with sister Anitra and updated regarding the above information. All questions answered.     TN did request a Podiatry follow-up after her IP psych stay. Patient told me she has a psych appointment previously scheduled.    Patient may be ready to discharge tomorrow. TN made transfer packet and placed original PEC/CEC in packet (by blue chart). I also made a copy of the PEC/CEC and placed in patient's blue folder. Patient's nurse April and Charge nurse Ifeoma aware regarding the above information.    Patient Contacts    Name Relation Home Work Mobile   Anitra Cain Sister [phone]     Tabitha Man Other [phone]     Catia Weathers Mother   [phone]       Future Appointments   Date Time Provider Department Center   4/28/2022  2:00 PM Donaldo Pnea MD [facility]   4/28/2022  2:30 PM Carley Palomo [facility] [place]   5/9/2022  2:00 PM Saloni De Anda MD [facility]           04/14/22 6879   Discharge Reassessment   Assessment Type Discharge Planning Reassessment   Did the patient's condition or plan change since previous assessment? No   Discharge Plan discussed with: Patient;Sibling   Discharge Plan A Psychiatric hospital   Discharge Plan B Home with family   Why the patient remains in the hospital Requires continued medical care   Post-Acute Status   Hospital Resources/Appts/Education Provided Appointments scheduled by Navigator/Coordinator   Discharge Delays None known at this time     Sammi Root RN    [phone]

## 2022-04-14 NOTE — PLAN OF CARE
Problem: Adult Inpatient Plan of Care  Goal: Plan of Care Review  4/13/2022 1928 by Lucille Gaytan RN  Outcome: Ongoing, Progressing

## 2022-04-14 NOTE — SUBJECTIVE & OBJECTIVE
Subjective:     Interval History: Vitals stable. No new pedal complaints. Patient requesting to take a shower. Dressings with bloody strikethrough, partially removed from feet.       Scheduled Meds:   aspirin  81 mg Oral Daily    clindamycin (CLEOCIN) IVPB  600 mg Intravenous Q8H    divalproex  1,000 mg Oral QHS    divalproex  500 mg Oral Daily    enoxaparin  1 mg/kg Subcutaneous Q12H    famotidine  20 mg Oral BID    ferrous sulfate  1 tablet Oral Daily    fluticasone furoate-vilanteroL  1 puff Inhalation Daily    furosemide  20 mg Oral Daily    gabapentin  300 mg Oral BID    miconazole NITRATE 2 %   Topical (Top) BID    nicotine  1 patch Transdermal Daily    pravastatin  40 mg Oral QHS    risperiDONE  2 mg Oral BID     Continuous Infusions:  PRN Meds:acetaminophen, albuterol-ipratropium, dextrose 10%, dextrose 10%, glucagon (human recombinant), glucose, glucose, hydrOXYzine pamoate, insulin aspart U-100, melatonin, naloxone, ondansetron, senna-docusate 8.6-50 mg, sodium chloride 0.9%    Review of Systems   Constitutional:  Negative for chills, diaphoresis, fatigue and fever.   Respiratory:  Negative for cough and shortness of breath.    Cardiovascular:  Negative for chest pain and leg swelling.   Gastrointestinal:  Negative for nausea and vomiting.   Musculoskeletal:  Positive for arthralgias and joint swelling. Negative for back pain and gait problem.   Skin:  Positive for color change and wound. Negative for pallor and rash.   Neurological:  Positive for numbness. Negative for tremors, speech difficulty and weakness.   Psychiatric/Behavioral:  Negative for agitation. The patient is not nervous/anxious.    Objective:     Vital Signs (Most Recent):  Temp: 98.8 °F (37.1 °C) (04/14/22 1152)  Pulse: 100 (04/14/22 1157)  Resp: 20 (04/14/22 1152)  BP: 132/62 (04/14/22 1152)  SpO2: (!) 94 % (04/14/22 1157)   Vital Signs (24h Range):  Temp:  [96.6 °F (35.9 °C)-99.7 °F (37.6 °C)] 98.8 °F (37.1 °C)  Pulse:  []  100  Resp:  [18-20] 20  SpO2:  [91 %-96 %] 94 %  BP: (124-144)/(61-67) 132/62     Weight: 107.8 kg (237 lb 10.5 oz)  Body mass index is 39.55 kg/m².    Foot Exam    General  General Appearance: appears stated age and healthy   Orientation: alert and oriented to person, place, and time   Affect: appropriate       Right Foot/Ankle     Inspection and Palpation  Ecchymosis: none  Tenderness: none   Swelling: none   Skin Exam: ulcer;     Neurovascular  Dorsalis pedis: 1+  Posterior tibial: 1+  Saphenous nerve sensation: diminished  Tibial nerve sensation: diminished  Superficial peroneal nerve sensation: diminished  Deep peroneal nerve sensation: diminished  Sural nerve sensation: diminished      Left Foot/Ankle      Inspection and Palpation  Ecchymosis: none  Tenderness: none   Swelling: dorsum   Skin Exam: cellulitis and ulcer;     Neurovascular  Dorsalis pedis: 1+  Posterior tibial: 1+  Saphenous nerve sensation: diminished  Tibial nerve sensation: diminished  Superficial peroneal nerve sensation: diminished  Deep peroneal nerve sensation: diminished  Sural nerve sensation: diminished        Laboratory:  Blood Cultures:   Recent Labs   Lab 04/13/22  0309   LABBLOO No Growth to date  No Growth to date  No Growth to date  No Growth to date     CBC:   Recent Labs   Lab 04/13/22  0028   WBC 31.22*   RBC 3.56*   HGB 9.9*   HCT 30.5*   *   MCV 86   MCH 27.8   MCHC 32.5     CMP:   Recent Labs   Lab 04/13/22  0028   *   CALCIUM 9.2   ALBUMIN 3.1*   PROT 5.8*      K 4.6   CO2 24   CL 99   BUN 15   CREATININE 0.6   ALKPHOS 75   ALT 28   AST 16   BILITOT 0.3     CRP: No results for input(s): CRP in the last 168 hours.  ESR: No results for input(s): SEDRATE in the last 168 hours.  Microbiology Results (last 7 days)       Procedure Component Value Units Date/Time    Aerobic culture [[id number]] Collected: 04/13/22 1234    Order Status: Completed Specimen: Wound from Foot, Left Updated: 04/14/22 8808      Aerobic Bacterial Culture Insufficient incubation, culture in progress    Blood culture [[id number]] Collected: 04/13/22 0309    Order Status: Completed Specimen: Blood from Peripheral, Antecubital, Left Updated: 04/14/22 1212     Blood Culture, Routine No Growth to date      No Growth to date    Blood culture [[id number]] Collected: 04/13/22 0309    Order Status: Completed Specimen: Blood from Peripheral, Antecubital, Left Updated: 04/14/22 1212     Blood Culture, Routine No Growth to date      No Growth to date    Culture, Anaerobe [[id number]] Collected: 04/13/22 1234    Order Status: Completed Specimen: Wound from Foot, Left Updated: 04/14/22 0714     Anaerobic Culture Culture in progress    Culture, Anaerobe [[id number]]     Order Status: No result Specimen: Wound     Aerobic culture [[id number]]     Order Status: No result Specimen: Wound           Specimen (24h ago, onward)                None              Clinical Findings:      LLE with erythema and edema noted. Ulcer to plantar left midfoot with periwound hyperkeratosis and dry skin. Fibronecrotic base. Appears to extend to dermis. Does not probe to bone. No fluctuance or crepitus noted.         Ulcer to plantar right distal forefoot and plantar right hallux, to dermis, with fibrogranular base. No purulence or acute signs of infection.

## 2022-04-15 LAB
ANION GAP SERPL CALC-SCNC: 13 MMOL/L (ref 8–16)
ANISOCYTOSIS BLD QL SMEAR: SLIGHT
BASOPHILS # BLD AUTO: 0.1 K/UL (ref 0–0.2)
BASOPHILS NFR BLD: 0.8 % (ref 0–1.9)
BUN SERPL-MCNC: 16 MG/DL (ref 6–20)
BURR CELLS BLD QL SMEAR: ABNORMAL
CALCIUM SERPL-MCNC: 9.8 MG/DL (ref 8.7–10.5)
CHLORIDE SERPL-SCNC: 98 MMOL/L (ref 95–110)
CO2 SERPL-SCNC: 26 MMOL/L (ref 23–29)
CREAT SERPL-MCNC: 0.6 MG/DL (ref 0.5–1.4)
DIFFERENTIAL METHOD: ABNORMAL
EOSINOPHIL # BLD AUTO: 0.4 K/UL (ref 0–0.5)
EOSINOPHIL NFR BLD: 3.6 % (ref 0–8)
ERYTHROCYTE [DISTWIDTH] IN BLOOD BY AUTOMATED COUNT: 15.3 % (ref 11.5–14.5)
EST. GFR  (AFRICAN AMERICAN): >60 ML/MIN/1.73 M^2
EST. GFR  (NON AFRICAN AMERICAN): >60 ML/MIN/1.73 M^2
FOLATE SERPL-MCNC: 12.4 NG/ML (ref 4–24)
GLUCOSE SERPL-MCNC: 158 MG/DL (ref 70–110)
HCT VFR BLD AUTO: 30.6 % (ref 37–48.5)
HGB BLD-MCNC: 10.1 G/DL (ref 12–16)
HYPOCHROMIA BLD QL SMEAR: ABNORMAL
IMM GRANULOCYTES # BLD AUTO: 0.1 K/UL (ref 0–0.04)
IMM GRANULOCYTES NFR BLD AUTO: 0.8 % (ref 0–0.5)
LYMPHOCYTES # BLD AUTO: 4.4 K/UL (ref 1–4.8)
LYMPHOCYTES NFR BLD: 36 % (ref 18–48)
MCH RBC QN AUTO: 28 PG (ref 27–31)
MCHC RBC AUTO-ENTMCNC: 33 G/DL (ref 32–36)
MCV RBC AUTO: 85 FL (ref 82–98)
MONOCYTES # BLD AUTO: 1.5 K/UL (ref 0.3–1)
MONOCYTES NFR BLD: 11.9 % (ref 4–15)
NEUTROPHILS # BLD AUTO: 5.8 K/UL (ref 1.8–7.7)
NEUTROPHILS NFR BLD: 46.9 % (ref 38–73)
NRBC BLD-RTO: 0 /100 WBC
OVALOCYTES BLD QL SMEAR: ABNORMAL
PLATELET # BLD AUTO: 554 K/UL (ref 150–450)
PLATELET BLD QL SMEAR: ABNORMAL
PMV BLD AUTO: 10.5 FL (ref 9.2–12.9)
POCT GLUCOSE: 228 MG/DL (ref 70–110)
POCT GLUCOSE: 240 MG/DL (ref 70–110)
POCT GLUCOSE: 329 MG/DL (ref 70–110)
POIKILOCYTOSIS BLD QL SMEAR: SLIGHT
POLYCHROMASIA BLD QL SMEAR: ABNORMAL
POTASSIUM SERPL-SCNC: 4.2 MMOL/L (ref 3.5–5.1)
RBC # BLD AUTO: 3.61 M/UL (ref 4–5.4)
SCHISTOCYTES BLD QL SMEAR: PRESENT
SODIUM SERPL-SCNC: 137 MMOL/L (ref 136–145)
VIT B12 SERPL-MCNC: 708 PG/ML (ref 210–950)
WBC # BLD AUTO: 12.32 K/UL (ref 3.9–12.7)

## 2022-04-15 PROCEDURE — 94761 N-INVAS EAR/PLS OXIMETRY MLT: CPT

## 2022-04-15 PROCEDURE — 25000003 PHARM REV CODE 250: Performed by: PHYSICIAN ASSISTANT

## 2022-04-15 PROCEDURE — 11000001 HC ACUTE MED/SURG PRIVATE ROOM

## 2022-04-15 PROCEDURE — 25000003 PHARM REV CODE 250: Performed by: NURSE PRACTITIONER

## 2022-04-15 PROCEDURE — 25000003 PHARM REV CODE 250

## 2022-04-15 PROCEDURE — 96372 THER/PROPH/DIAG INJ SC/IM: CPT | Performed by: HOSPITALIST

## 2022-04-15 PROCEDURE — 25000003 PHARM REV CODE 250: Performed by: PSYCHIATRY & NEUROLOGY

## 2022-04-15 PROCEDURE — 80048 BASIC METABOLIC PNL TOTAL CA: CPT

## 2022-04-15 PROCEDURE — G0378 HOSPITAL OBSERVATION PER HR: HCPCS

## 2022-04-15 PROCEDURE — 63600175 PHARM REV CODE 636 W HCPCS: Performed by: HOSPITALIST

## 2022-04-15 PROCEDURE — 25000003 PHARM REV CODE 250: Performed by: HOSPITALIST

## 2022-04-15 PROCEDURE — 85025 COMPLETE CBC W/AUTO DIFF WBC: CPT

## 2022-04-15 PROCEDURE — 36415 COLL VENOUS BLD VENIPUNCTURE: CPT

## 2022-04-15 PROCEDURE — 99900035 HC TECH TIME PER 15 MIN (STAT)

## 2022-04-15 PROCEDURE — 94640 AIRWAY INHALATION TREATMENT: CPT

## 2022-04-15 RX ORDER — LISINOPRIL 10 MG/1
10 TABLET ORAL DAILY
Status: DISCONTINUED | OUTPATIENT
Start: 2022-04-15 | End: 2022-04-19

## 2022-04-15 RX ORDER — TRAMADOL HYDROCHLORIDE 50 MG/1
50 TABLET ORAL ONCE
Status: COMPLETED | OUTPATIENT
Start: 2022-04-15 | End: 2022-04-15

## 2022-04-15 RX ORDER — HYDROCORTISONE 25 MG/G
CREAM TOPICAL 2 TIMES DAILY PRN
Status: DISCONTINUED | OUTPATIENT
Start: 2022-04-15 | End: 2022-04-26 | Stop reason: HOSPADM

## 2022-04-15 RX ADMIN — GABAPENTIN 300 MG: 300 CAPSULE ORAL at 09:04

## 2022-04-15 RX ADMIN — HYDROXYZINE PAMOATE 50 MG: 25 CAPSULE ORAL at 01:04

## 2022-04-15 RX ADMIN — FUROSEMIDE 20 MG: 20 TABLET ORAL at 09:04

## 2022-04-15 RX ADMIN — ACETAMINOPHEN 650 MG: 325 TABLET ORAL at 02:04

## 2022-04-15 RX ADMIN — SODIUM CHLORIDE 500 ML: 0.9 INJECTION, SOLUTION INTRAVENOUS at 01:04

## 2022-04-15 RX ADMIN — MICONAZOLE NITRATE 2 % TOPICAL POWDER: at 10:04

## 2022-04-15 RX ADMIN — INSULIN ASPART 2 UNITS: 100 INJECTION, SOLUTION INTRAVENOUS; SUBCUTANEOUS at 09:04

## 2022-04-15 RX ADMIN — CLINDAMYCIN IN 5 PERCENT DEXTROSE 600 MG: 12 INJECTION, SOLUTION INTRAVENOUS at 09:04

## 2022-04-15 RX ADMIN — RISPERIDONE 2 MG: 1 TABLET, ORALLY DISINTEGRATING ORAL at 08:04

## 2022-04-15 RX ADMIN — DIVALPROEX SODIUM 500 MG: 250 TABLET, DELAYED RELEASE ORAL at 09:04

## 2022-04-15 RX ADMIN — ACETAMINOPHEN 650 MG: 325 TABLET ORAL at 09:04

## 2022-04-15 RX ADMIN — FAMOTIDINE 20 MG: 20 TABLET ORAL at 08:04

## 2022-04-15 RX ADMIN — ACETAMINOPHEN 650 MG: 325 TABLET ORAL at 08:04

## 2022-04-15 RX ADMIN — DOCUSATE SODIUM AND SENNOSIDES 1 TABLET: 8.6; 5 TABLET, FILM COATED ORAL at 06:04

## 2022-04-15 RX ADMIN — DIVALPROEX SODIUM 1000 MG: 250 TABLET, DELAYED RELEASE ORAL at 09:04

## 2022-04-15 RX ADMIN — RISPERIDONE 2 MG: 1 TABLET, ORALLY DISINTEGRATING ORAL at 09:04

## 2022-04-15 RX ADMIN — LISINOPRIL 10 MG: 10 TABLET ORAL at 01:04

## 2022-04-15 RX ADMIN — HYDROXYZINE PAMOATE 50 MG: 25 CAPSULE ORAL at 09:04

## 2022-04-15 RX ADMIN — ENOXAPARIN SODIUM 100 MG: 100 INJECTION SUBCUTANEOUS at 09:04

## 2022-04-15 RX ADMIN — ENOXAPARIN SODIUM 100 MG: 100 INJECTION SUBCUTANEOUS at 08:04

## 2022-04-15 RX ADMIN — INSULIN ASPART 8 UNITS: 100 INJECTION, SOLUTION INTRAVENOUS; SUBCUTANEOUS at 05:04

## 2022-04-15 RX ADMIN — ASPIRIN 81 MG CHEWABLE TABLET 81 MG: 81 TABLET CHEWABLE at 09:04

## 2022-04-15 RX ADMIN — CLINDAMYCIN IN 5 PERCENT DEXTROSE 600 MG: 12 INJECTION, SOLUTION INTRAVENOUS at 04:04

## 2022-04-15 RX ADMIN — PRAVASTATIN SODIUM 40 MG: 40 TABLET ORAL at 08:04

## 2022-04-15 RX ADMIN — TRAMADOL HYDROCHLORIDE 50 MG: 50 TABLET, FILM COATED ORAL at 09:04

## 2022-04-15 RX ADMIN — FAMOTIDINE 20 MG: 20 TABLET ORAL at 09:04

## 2022-04-15 RX ADMIN — CLINDAMYCIN IN 5 PERCENT DEXTROSE 600 MG: 12 INJECTION, SOLUTION INTRAVENOUS at 01:04

## 2022-04-15 RX ADMIN — GABAPENTIN 300 MG: 300 CAPSULE ORAL at 08:04

## 2022-04-15 RX ADMIN — FLUTICASONE FUROATE AND VILANTEROL TRIFENATATE 1 PUFF: 100; 25 POWDER RESPIRATORY (INHALATION) at 08:04

## 2022-04-15 NOTE — PROGRESS NOTES
[facility] Medicine  Telemedicine Progress Note    Patient Name: Audrey Natarajan  MRN: [id number]  Patient Class: OP- Observation   Admission Date: 4/12/2022  Length of Stay: 0 days  Attending Physician: Aniya Evans MD  Primary Care Provider: Donaldo Pena MD          Subjective:     Principal Problem:Acute deep vein thrombosis (DVT) of both lower extremities        HPI:  Audrey Natarajan is a 50 yo female with a pmh of DM2, bipolar 1 disorder, cellulitis, COPD, HTN, CAD, DVT/PE. She presented to the ED with c/o fevers x 1 day. She also has BLE swelling and pain and wounds to both feet, worsening x 2 weeks. She had fever up to 102F yesterday. She reports the wounds to her right foot are from dragging her feet while she was angry. Denies drainage to foot wounds. She has had blood clots in the past and has an IVC filter placed in 2012. She was sent here from [facility] where she was under CEC for psychosis. ED workup revealed BLE DVT on venous US, WBC 31, and Hgb 9.9. She received a dose of vancomycin while in the ED.       Overview/Hospital Course:  Admitted for diabetic foot infection, cellulitis, and DVT.  Pt with h/o DVT/PE, hypercoagulable state, IVC filter in place. Therapeutic lovenox initiated.  Podiatry consulted for diabetic foot ulcer, debrided on 4/13 with cultures obtained - growing Staph aureus so far. On IV Clindamycin. Psych consulted for medication evaluation, pt with CEC from behavioral health facility.       Interval History: Infection improving. Culture growing Staph aureus so far; awaiting sensitivities. Sinus tachy - giving 500 cc NS bolus.     Review of Systems   Constitutional:  Negative for appetite change and fever.   HENT:  Negative for congestion.    Respiratory:  Negative for cough and shortness of breath.    Cardiovascular:  Positive for leg swelling. Negative for chest pain.   Gastrointestinal:  Positive for constipation. Negative for abdominal  pain, diarrhea, nausea and vomiting.   Genitourinary:  Negative for dysuria and hematuria.   Musculoskeletal:  Positive for arthralgias.   Skin:  Positive for color change and wound.   Neurological:  Negative for weakness and headaches.   Psychiatric/Behavioral:  Negative for agitation and confusion.    Objective:     Vital Signs (Most Recent):  Temp: 99.7 °F (37.6 °C) (04/15/22 1200)  Pulse: (!) 119 (04/15/22 1200)  Resp: 18 (04/15/22 1200)  BP: (!) 159/71 (04/15/22 1200)  SpO2: 97 % (04/15/22 0757)   Vital Signs (24h Range):  Temp:  [97.8 °F (36.6 °C)-99.7 °F (37.6 °C)] 99.7 °F (37.6 °C)  Pulse:  [] 119  Resp:  [18-22] 18  SpO2:  [91 %-100 %] 97 %  BP: (140-175)/(69-81) 159/71     Weight: 107.8 kg (237 lb 10.5 oz)  Body mass index is 39.55 kg/m².    Intake/Output Summary (Last 24 hours) at 4/15/2022 1244  Last data filed at 4/15/2022 1200  Gross per 24 hour   Intake 333.53 ml   Output 2300 ml   Net -1966.47 ml        Physical Exam  Vitals and nursing note reviewed.   Constitutional:       General: She is not in acute distress.     Appearance: She is obese. She is not toxic-appearing.   HENT:      Head: Normocephalic and atraumatic.      Nose: Nose normal.      Mouth/Throat:      Mouth: Mucous membranes are moist.   Eyes:      Pupils: Pupils are equal, round, and reactive to light.   Cardiovascular:      Rate and Rhythm: Regular rhythm. Tachycardia present.      Pulses: Normal pulses.      Heart sounds: Normal heart sounds.   Pulmonary:      Effort: Pulmonary effort is normal.      Breath sounds: Normal breath sounds.   Abdominal:      General: Bowel sounds are normal.      Palpations: Abdomen is soft.   Musculoskeletal:         General: Tenderness present. Normal range of motion.      Cervical back: Normal range of motion.      Right lower leg: No edema.      Left lower leg: No edema.   Skin:     General: Skin is warm and dry.      Comments: BLE warmth, erythema with improved edema.  Sloughing of skin on R  foot and wound L midfoot   Neurological:      Mental Status: She is alert and oriented to person, place, and time.   Psychiatric:         Behavior: Behavior normal.         Thought Content: Thought content normal.             Significant Labs: All pertinent labs within the past 24 hours have been reviewed.  BMP:   Recent Labs   Lab 04/15/22  0310   *      K 4.2   CL 98   CO2 26   BUN 16   CREATININE 0.6   CALCIUM 9.8       CBC:   Recent Labs   Lab 04/14/22  1521 04/15/22  0310   WBC 14.07* 12.32   HGB 10.8* 10.1*   HCT 33.8* 30.6*   * 554*       CMP:   Recent Labs   Lab 04/14/22  1521 04/15/22  0310    137   K 4.2 4.2   CL 96 98   CO2 30* 26   * 158*   BUN 12 16   CREATININE 0.7 0.6   CALCIUM 10.4 9.8   PROT 7.3  --    ALBUMIN 3.2*  --    BILITOT 0.2  --    ALKPHOS 100  --    AST 40  --    ALT 67*  --    ANIONGAP 12 13   EGFRNONAA >60 >60         Significant Imaging: I have reviewed all pertinent imaging results/findings within the past 24 hours.      Assessment/Plan:      * Acute deep vein thrombosis (DVT) of both lower extremities  Hx of DVT/PE, hypercoagulable state, IVC filter in place  BLE venous US with thrombosis of bilateral posterior tibial veins    - Continue therapeutic lovenox    Anemia  Denies bleeding, baseline 12-13, 9.9 on admit  Iron panel: Iron 13, TIBC 456, Sat Iron 3, Transferrin and Ferritin WNL    - Iron supplement  - Continue to monitor with daily CBC    Diabetic foot ulcer associated with type 2 diabetes mellitus  B/l foot ulcers  - Podiatry consulted,  - Xray in feet bilaterally, findings consistent with Charcot joint of left foot, no acute abnormality   - MRI ordered edema noted as well as charcot changes of left foot without evidence of osteomyelitis  - LLE ulcer debrided with deep cultures taken.   - Site dressed with xeroform and kerlix. Nursing orders in for daily dressing changes  - She is NWB to LLE due to ulcer and history of charcot foot.  - Will  continue to follow  - Cont clindamycin - culture growing Staph aureus, awaiting sensitivities.    SHAWN (obstructive sleep apnea)  Does not use CPAP    Psychosis  Bipolar 1 disorder    -Continue CEC from behavioral health facility  -Consulted psych for eval and medication management  - Cont Depakote and risperidone      Cellulitis of lower extremity  WBC 31, now resolved, lactic acid negative.  Given vanc in ED, switched to clindamycin.  BCx's NGTD.  Cellulitis is improving, difficult with b/l DVTs however will continue antibiotics with significant elevation of WBC.    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  Resume home lisinopril today.      VTE Risk Mitigation (From admission, onward)         Ordered     enoxaparin injection 100 mg  Every 12 hours (non-standard times)         04/14/22 0056     IP VTE HIGH RISK PATIENT  Once         04/13/22 0246     Place sequential compression device  Until discontinued         04/13/22 0246                      I have assessed these finding virtually using telemed platform and with assistance of bedside nurse                 The attending portion of this evaluation, treatment, and documentation was performed per Aniya Evans MD via Telemedicine AudioVisual using the secure [employer] software platform with 2 way audio/video. The provider was located off-site and the patient is located in the hospital. The aforementioned video software was utilized to document the relevant history and physical exam    Aniya Evans MD  Department of Hospital Medicine   [place] - Telemetry

## 2022-04-15 NOTE — PLAN OF CARE
Pt on room air in no apparent distress.  MDI tx. Given with good pt. Effort.  Will cont. To monitor.

## 2022-04-15 NOTE — CONSULTS
[facility]  Telemedicine Consult Note      Thank you for your consult to [facility]. We have reviewed the patient chart. This patient does meet criteria for [facility] service at this time. Will assume care on 04/15/22 at 6AM.      Diego Ridley MD  Department of [facility]

## 2022-04-15 NOTE — ASSESSMENT & PLAN NOTE
B/l foot ulcers  - Podiatry consulted,  - Xray in feet bilaterally, findings consistent with Charcot joint of left foot, no acute abnormality   - MRI ordered edema noted as well as charcot changes of left foot without evidence of osteomyelitis  - [facility] ulcer debrided with deep cultures taken.   - Site dressed with xeroform and kerlix. Nursing orders in for daily dressing changes  - She is NWB to [facility] due to ulcer and history of charcot foot.  - Will continue to follow  - Cont clindamycin - culture growing Staph aureus, awaiting sensitivities.

## 2022-04-15 NOTE — PLAN OF CARE
POC reviewed with pt. Pt remained free of injury/trauma throughout the shift. Safety precautions maintained. Bedrails upx2, bed in lowest position and locked. Call bell in reach at all times. Pt able to ambulate and reposition self in bed. Pt with sitter at bedside this shift. Pt agitated throughout the shift.

## 2022-04-15 NOTE — ASSESSMENT & PLAN NOTE
WBC 31, now resolved, lactic acid negative.  Given vanc in ED, switched to clindamycin.  BCx's NGTD.  Cellulitis is improving, difficult with b/l DVTs however will continue antibiotics with significant elevation of WBC.

## 2022-04-15 NOTE — SUBJECTIVE & OBJECTIVE
Interval History: Infection improving. Culture growing Staph aureus so far; awaiting sensitivities.    Review of Systems   Constitutional:  Negative for appetite change and fever.   HENT:  Negative for congestion.    Respiratory:  Negative for cough and shortness of breath.    Cardiovascular:  Positive for leg swelling. Negative for chest pain.   Gastrointestinal:  Positive for constipation. Negative for abdominal pain, diarrhea, nausea and vomiting.   Genitourinary:  Negative for dysuria and hematuria.   Musculoskeletal:  Positive for arthralgias.   Skin:  Positive for color change and wound.   Neurological:  Negative for weakness and headaches.   Psychiatric/Behavioral:  Negative for agitation and confusion.    Objective:     Vital Signs (Most Recent):  Temp: 99.7 °F (37.6 °C) (04/15/22 1200)  Pulse: (!) 119 (04/15/22 1200)  Resp: 18 (04/15/22 1200)  BP: (!) 159/71 (04/15/22 1200)  SpO2: 97 % (04/15/22 0757)   Vital Signs (24h Range):  Temp:  [97.8 °F (36.6 °C)-99.7 °F (37.6 °C)] 99.7 °F (37.6 °C)  Pulse:  [] 119  Resp:  [18-22] 18  SpO2:  [91 %-100 %] 97 %  BP: (140-175)/(69-81) 159/71     Weight: 107.8 kg (237 lb 10.5 oz)  Body mass index is 39.55 kg/m².    Intake/Output Summary (Last 24 hours) at 4/15/2022 1244  Last data filed at 4/15/2022 1200  Gross per 24 hour   Intake 333.53 ml   Output 2300 ml   Net -1966.47 ml        Physical Exam  Vitals and nursing note reviewed.   Constitutional:       General: She is not in acute distress.     Appearance: She is obese. She is not toxic-appearing.   HENT:      Head: Normocephalic and atraumatic.      Nose: Nose normal.      Mouth/Throat:      Mouth: Mucous membranes are moist.   Eyes:      Pupils: Pupils are equal, round, and reactive to light.   Cardiovascular:      Rate and Rhythm: Regular rhythm. Tachycardia present.      Pulses: Normal pulses.      Heart sounds: Normal heart sounds.   Pulmonary:      Effort: Pulmonary effort is normal.      Breath sounds:  Normal breath sounds.   Abdominal:      General: Bowel sounds are normal.      Palpations: Abdomen is soft.   Musculoskeletal:         General: Tenderness present. Normal range of motion.      Cervical back: Normal range of motion.      Right lower leg: No edema.      Left lower leg: No edema.   Skin:     General: Skin is warm and dry.      Comments: BLE warmth, erythema with improved edema.  Sloughing of skin on R foot and wound L midfoot   Neurological:      Mental Status: She is alert and oriented to person, place, and time.   Psychiatric:         Behavior: Behavior normal.         Thought Content: Thought content normal.             Significant Labs: All pertinent labs within the past 24 hours have been reviewed.  BMP:   Recent Labs   Lab 04/15/22  0310   *      K 4.2   CL 98   CO2 26   BUN 16   CREATININE 0.6   CALCIUM 9.8       CBC:   Recent Labs   Lab 04/14/22  1521 04/15/22  0310   WBC 14.07* 12.32   HGB 10.8* 10.1*   HCT 33.8* 30.6*   * 554*       CMP:   Recent Labs   Lab 04/14/22  1521 04/15/22  0310    137   K 4.2 4.2   CL 96 98   CO2 30* 26   * 158*   BUN 12 16   CREATININE 0.7 0.6   CALCIUM 10.4 9.8   PROT 7.3  --    ALBUMIN 3.2*  --    BILITOT 0.2  --    ALKPHOS 100  --    AST 40  --    ALT 67*  --    ANIONGAP 12 13   EGFRNONAA >60 >60         Significant Imaging: I have reviewed all pertinent imaging results/findings within the past 24 hours.

## 2022-04-15 NOTE — PLAN OF CARE
Problem: Adult Inpatient Plan of Care  Goal: Plan of Care Review  Outcome: Ongoing   VIRTUAL NURSE:  Labs, notes, orders, and careplan reviewed.

## 2022-04-16 LAB
ANISOCYTOSIS BLD QL SMEAR: SLIGHT
BACTERIA SPEC AEROBE CULT: ABNORMAL
BASOPHILS # BLD AUTO: 0.12 K/UL (ref 0–0.2)
BASOPHILS NFR BLD: 0.9 % (ref 0–1.9)
BURR CELLS BLD QL SMEAR: ABNORMAL
DIFFERENTIAL METHOD: ABNORMAL
EOSINOPHIL # BLD AUTO: 0.6 K/UL (ref 0–0.5)
EOSINOPHIL NFR BLD: 4.2 % (ref 0–8)
ERYTHROCYTE [DISTWIDTH] IN BLOOD BY AUTOMATED COUNT: 15.5 % (ref 11.5–14.5)
GLUCOSE SERPL-MCNC: 316 MG/DL (ref 70–110)
HCT VFR BLD AUTO: 36.1 % (ref 37–48.5)
HGB BLD-MCNC: 11.5 G/DL (ref 12–16)
HYPOCHROMIA BLD QL SMEAR: ABNORMAL
IMM GRANULOCYTES # BLD AUTO: 0.28 K/UL (ref 0–0.04)
IMM GRANULOCYTES NFR BLD AUTO: 2.1 % (ref 0–0.5)
LYMPHOCYTES # BLD AUTO: 4.1 K/UL (ref 1–4.8)
LYMPHOCYTES NFR BLD: 30.9 % (ref 18–48)
MCH RBC QN AUTO: 27.8 PG (ref 27–31)
MCHC RBC AUTO-ENTMCNC: 31.9 G/DL (ref 32–36)
MCV RBC AUTO: 87 FL (ref 82–98)
MONOCYTES # BLD AUTO: 1.5 K/UL (ref 0.3–1)
MONOCYTES NFR BLD: 10.9 % (ref 4–15)
NEUTROPHILS # BLD AUTO: 6.8 K/UL (ref 1.8–7.7)
NEUTROPHILS NFR BLD: 51 % (ref 38–73)
NRBC BLD-RTO: 0 /100 WBC
OVALOCYTES BLD QL SMEAR: ABNORMAL
PLATELET # BLD AUTO: 562 K/UL (ref 150–450)
PLATELET BLD QL SMEAR: ABNORMAL
PMV BLD AUTO: 10 FL (ref 9.2–12.9)
POCT GLUCOSE: 214 MG/DL (ref 70–110)
POCT GLUCOSE: 246 MG/DL (ref 70–110)
POCT GLUCOSE: 289 MG/DL (ref 70–110)
POCT GLUCOSE: 316 MG/DL (ref 70–110)
POIKILOCYTOSIS BLD QL SMEAR: SLIGHT
POLYCHROMASIA BLD QL SMEAR: ABNORMAL
RBC # BLD AUTO: 4.14 M/UL (ref 4–5.4)
VALPROATE SERPL-MCNC: 64.3 UG/ML (ref 50–100)
WBC # BLD AUTO: 13.25 K/UL (ref 3.9–12.7)

## 2022-04-16 PROCEDURE — 25000003 PHARM REV CODE 250: Performed by: PSYCHIATRY & NEUROLOGY

## 2022-04-16 PROCEDURE — 25000003 PHARM REV CODE 250: Performed by: NURSE PRACTITIONER

## 2022-04-16 PROCEDURE — 94640 AIRWAY INHALATION TREATMENT: CPT

## 2022-04-16 PROCEDURE — 85025 COMPLETE CBC W/AUTO DIFF WBC: CPT | Performed by: HOSPITALIST

## 2022-04-16 PROCEDURE — 94761 N-INVAS EAR/PLS OXIMETRY MLT: CPT

## 2022-04-16 PROCEDURE — 36415 COLL VENOUS BLD VENIPUNCTURE: CPT | Performed by: PHYSICIAN ASSISTANT

## 2022-04-16 PROCEDURE — 11000001 HC ACUTE MED/SURG PRIVATE ROOM

## 2022-04-16 PROCEDURE — 96372 THER/PROPH/DIAG INJ SC/IM: CPT | Performed by: HOSPITALIST

## 2022-04-16 PROCEDURE — G0378 HOSPITAL OBSERVATION PER HR: HCPCS

## 2022-04-16 PROCEDURE — 63600175 PHARM REV CODE 636 W HCPCS: Performed by: HOSPITALIST

## 2022-04-16 PROCEDURE — 80164 ASSAY DIPROPYLACETIC ACD TOT: CPT | Performed by: PHYSICIAN ASSISTANT

## 2022-04-16 PROCEDURE — 25000003 PHARM REV CODE 250: Performed by: HOSPITALIST

## 2022-04-16 PROCEDURE — 99900035 HC TECH TIME PER 15 MIN (STAT)

## 2022-04-16 RX ORDER — TRAMADOL HYDROCHLORIDE 50 MG/1
50 TABLET ORAL ONCE
Status: COMPLETED | OUTPATIENT
Start: 2022-04-16 | End: 2022-04-16

## 2022-04-16 RX ORDER — HYDROCODONE BITARTRATE AND ACETAMINOPHEN 10; 325 MG/1; MG/1
1 TABLET ORAL EVERY 6 HOURS PRN
Status: DISCONTINUED | OUTPATIENT
Start: 2022-04-16 | End: 2022-04-16

## 2022-04-16 RX ORDER — MORPHINE SULFATE 2 MG/ML
1 INJECTION, SOLUTION INTRAMUSCULAR; INTRAVENOUS EVERY 6 HOURS PRN
Status: DISCONTINUED | OUTPATIENT
Start: 2022-04-16 | End: 2022-04-16

## 2022-04-16 RX ORDER — HYDROCODONE BITARTRATE AND ACETAMINOPHEN 10; 325 MG/1; MG/1
1 TABLET ORAL EVERY 12 HOURS PRN
Status: DISCONTINUED | OUTPATIENT
Start: 2022-04-16 | End: 2022-04-26 | Stop reason: HOSPADM

## 2022-04-16 RX ADMIN — INSULIN ASPART 4 UNITS: 100 INJECTION, SOLUTION INTRAVENOUS; SUBCUTANEOUS at 04:04

## 2022-04-16 RX ADMIN — ACETAMINOPHEN 650 MG: 325 TABLET ORAL at 03:04

## 2022-04-16 RX ADMIN — ASPIRIN 81 MG CHEWABLE TABLET 81 MG: 81 TABLET CHEWABLE at 08:04

## 2022-04-16 RX ADMIN — CLINDAMYCIN IN 5 PERCENT DEXTROSE 600 MG: 12 INJECTION, SOLUTION INTRAVENOUS at 08:04

## 2022-04-16 RX ADMIN — ACETAMINOPHEN 650 MG: 325 TABLET ORAL at 02:04

## 2022-04-16 RX ADMIN — INSULIN ASPART 8 UNITS: 100 INJECTION, SOLUTION INTRAVENOUS; SUBCUTANEOUS at 11:04

## 2022-04-16 RX ADMIN — INSULIN ASPART 2 UNITS: 100 INJECTION, SOLUTION INTRAVENOUS; SUBCUTANEOUS at 08:04

## 2022-04-16 RX ADMIN — LISINOPRIL 10 MG: 10 TABLET ORAL at 08:04

## 2022-04-16 RX ADMIN — FUROSEMIDE 20 MG: 20 TABLET ORAL at 08:04

## 2022-04-16 RX ADMIN — RISPERIDONE 2 MG: 1 TABLET, ORALLY DISINTEGRATING ORAL at 08:04

## 2022-04-16 RX ADMIN — HYDROCORTISONE: 25 CREAM TOPICAL at 12:04

## 2022-04-16 RX ADMIN — CLINDAMYCIN IN 5 PERCENT DEXTROSE 600 MG: 12 INJECTION, SOLUTION INTRAVENOUS at 01:04

## 2022-04-16 RX ADMIN — CLINDAMYCIN IN 5 PERCENT DEXTROSE 600 MG: 12 INJECTION, SOLUTION INTRAVENOUS at 04:04

## 2022-04-16 RX ADMIN — DIVALPROEX SODIUM 1000 MG: 250 TABLET, DELAYED RELEASE ORAL at 09:04

## 2022-04-16 RX ADMIN — TRAMADOL HYDROCHLORIDE 50 MG: 50 TABLET, FILM COATED ORAL at 06:04

## 2022-04-16 RX ADMIN — DIVALPROEX SODIUM 500 MG: 250 TABLET, DELAYED RELEASE ORAL at 08:04

## 2022-04-16 RX ADMIN — PRAVASTATIN SODIUM 40 MG: 40 TABLET ORAL at 08:04

## 2022-04-16 RX ADMIN — ENOXAPARIN SODIUM 100 MG: 100 INJECTION SUBCUTANEOUS at 08:04

## 2022-04-16 RX ADMIN — FLUTICASONE FUROATE AND VILANTEROL TRIFENATATE 1 PUFF: 100; 25 POWDER RESPIRATORY (INHALATION) at 08:04

## 2022-04-16 RX ADMIN — GABAPENTIN 300 MG: 300 CAPSULE ORAL at 08:04

## 2022-04-16 RX ADMIN — HYDROCODONE BITARTRATE AND ACETAMINOPHEN 1 TABLET: 10; 325 TABLET ORAL at 08:04

## 2022-04-16 RX ADMIN — MICONAZOLE NITRATE 2 % TOPICAL POWDER: at 08:04

## 2022-04-16 RX ADMIN — FAMOTIDINE 20 MG: 20 TABLET ORAL at 08:04

## 2022-04-16 NOTE — NURSING
"Pt's very aggitated. Pt very rude to nurse during the shift, told the nurse "go back to [place]". Pt complaining of pain on feet, analgesia given as per MAR. Pt not in distress. Sinus tachy on telemetry. 's to 110's. With sitter 1:1 at bedside. Pt ambulatory in the room. Safety maintained at all times. Call bell within reach. Bed on low position. Will continue to monitor.   "

## 2022-04-16 NOTE — ASSESSMENT & PLAN NOTE
B/l foot ulcers  - Podiatry consulted,  - Xray in feet bilaterally, findings consistent with Charcot joint of left foot, no acute abnormality   - MRI ordered edema noted as well as charcot changes of left foot without evidence of osteomyelitis  - [facility] ulcer debrided with deep cultures taken.   - Site dressed with xeroform and kerlix. Nursing orders in for daily dressing changes  - She is NWB to [facility] due to ulcer and history of charcot foot.  - Will continue to follow  - Cont clindamycin - culture growing Staph aureus, awaiting sensitivities.

## 2022-04-16 NOTE — SUBJECTIVE & OBJECTIVE
Interval History: Patient seen via tele-med. Doing well this am. Awaiting sensitivities.     Review of Systems   Constitutional:  Negative for activity change, appetite change and fatigue.   HENT:  Negative for trouble swallowing.    Respiratory:  Negative for shortness of breath.    Cardiovascular:  Negative for chest pain and leg swelling.   Gastrointestinal:  Negative for diarrhea, nausea and vomiting.   Genitourinary:  Negative for difficulty urinating and hematuria.   Musculoskeletal:  Positive for myalgias.   Skin:  Positive for wound.   Hematological:  Does not bruise/bleed easily.   Psychiatric/Behavioral:  Negative for confusion. The patient is nervous/anxious and is hyperactive.    Objective:     Vital Signs (Most Recent):  Temp: 98.5 °F (36.9 °C) (04/16/22 0451)  Pulse: (!) 120 (04/16/22 0810)  Resp: 18 (04/16/22 0826)  BP: (!) 143/78 (04/16/22 0451)  SpO2: 98 % (04/16/22 0810)   Vital Signs (24h Range):  Temp:  [98.5 °F (36.9 °C)-99.7 °F (37.6 °C)] 98.5 °F (36.9 °C)  Pulse:  [103-120] 120  Resp:  [18] 18  SpO2:  [93 %-98 %] 98 %  BP: (143-159)/(55-78) 143/78     Weight: 107.8 kg (237 lb 10.5 oz)  Body mass index is 39.55 kg/m².    Intake/Output Summary (Last 24 hours) at 4/16/2022 0959  Last data filed at 4/16/2022 0833  Gross per 24 hour   Intake 849 ml   Output 1300 ml   Net -451 ml      Physical Exam  Vitals and nursing note reviewed.   Constitutional:       Appearance: She is obese.   HENT:      Head: Normocephalic and atraumatic.      Nose: Nose normal.      Mouth/Throat:      Mouth: Mucous membranes are moist.   Eyes:      Conjunctiva/sclera: Conjunctivae normal.   Cardiovascular:      Rate and Rhythm: Regular rhythm. Tachycardia present.      Pulses: Normal pulses.   Pulmonary:      Effort: Pulmonary effort is normal.   Abdominal:      General: Bowel sounds are normal. There is no distension.      Tenderness: There is no abdominal tenderness.   Musculoskeletal:         General: Tenderness present.  Normal range of motion.      Cervical back: Normal range of motion and neck supple.   Skin:     General: Skin is warm.      Capillary Refill: Capillary refill takes 2 to 3 seconds.      Coloration: Skin is not jaundiced.      Findings: No bruising.      Comments: BLE warmth, erythema with improved edema.  Sloughing of skin on R foot and wound L midfoot    Neurological:      Mental Status: She is alert and oriented to person, place, and time.   Psychiatric:         Behavior: Behavior is hyperactive.       Significant Labs: All pertinent labs within the past 24 hours have been reviewed.    Significant Imaging: I have reviewed all pertinent imaging results/findings within the past 24 hours.

## 2022-04-16 NOTE — NURSING
04/15/22 2100   Patient Request   Patient Requested c/o left foot pain not relieved with tylenol and requesting tramadol   Provider Notification   Provider Notified? Yes   Name of Provider NP Nehal Barnett   Provider Notification Method Secure Chat   Provider Notified Of Patient Request

## 2022-04-16 NOTE — PROGRESS NOTES
[facility] Medicine  Telemedicine Progress Note    Patient Name: Audrey Natarajan  MRN: [id number]  Patient Class: OP- Observation   Admission Date: 4/12/2022  Length of Stay: 0 days  Attending Physician: Scahi Carrizales MD  Primary Care Provider: Donaldo Pena MD          Subjective:     Principal Problem:Acute deep vein thrombosis (DVT) of both lower extremities        HPI:  Audrey Natarajan is a 50 yo female with a pmh of DM2, bipolar 1 disorder, cellulitis, COPD, HTN, CAD, DVT/PE. She presented to the ED with c/o fevers x 1 day. She also has BLE swelling and pain and wounds to both feet, worsening x 2 weeks. She had fever up to 102F yesterday. She reports the wounds to her right foot are from dragging her feet while she was angry. Denies drainage to foot wounds. She has had blood clots in the past and has an IVC filter placed in 2012. She was sent here from [facility] where she was under CEC for psychosis. ED workup revealed BLE DVT on venous US, WBC 31, and Hgb 9.9. She received a dose of vancomycin while in the ED.       Overview/Hospital Course:  Admitted for diabetic foot infection, cellulitis, and DVT.  Pt with h/o DVT/PE, hypercoagulable state, IVC filter in place. Therapeutic lovenox initiated.  Podiatry consulted for diabetic foot ulcer, debrided on 4/13 with cultures obtained - growing Staph aureus so far. On IV Clindamycin. Psych consulted for medication evaluation, pt with CEC from behavioral health facility.       Interval History: Patient seen via tele-med. Doing well this am. Awaiting sensitivities.     Review of Systems   Constitutional:  Negative for activity change, appetite change and fatigue.   HENT:  Negative for trouble swallowing.    Respiratory:  Negative for shortness of breath.    Cardiovascular:  Negative for chest pain and leg swelling.   Gastrointestinal:  Negative for diarrhea, nausea and vomiting.   Genitourinary:  Negative for  difficulty urinating and hematuria.   Musculoskeletal:  Positive for myalgias.   Skin:  Positive for wound.   Hematological:  Does not bruise/bleed easily.   Psychiatric/Behavioral:  Negative for confusion. The patient is nervous/anxious and is hyperactive.    Objective:     Vital Signs (Most Recent):  Temp: 98.5 °F (36.9 °C) (04/16/22 0451)  Pulse: (!) 120 (04/16/22 0810)  Resp: 18 (04/16/22 0826)  BP: (!) 143/78 (04/16/22 0451)  SpO2: 98 % (04/16/22 0810)   Vital Signs (24h Range):  Temp:  [98.5 °F (36.9 °C)-99.7 °F (37.6 °C)] 98.5 °F (36.9 °C)  Pulse:  [103-120] 120  Resp:  [18] 18  SpO2:  [93 %-98 %] 98 %  BP: (143-159)/(55-78) 143/78     Weight: 107.8 kg (237 lb 10.5 oz)  Body mass index is 39.55 kg/m².    Intake/Output Summary (Last 24 hours) at 4/16/2022 0959  Last data filed at 4/16/2022 0833  Gross per 24 hour   Intake 849 ml   Output 1300 ml   Net -451 ml      Physical Exam  Vitals and nursing note reviewed.   Constitutional:       Appearance: She is obese.   HENT:      Head: Normocephalic and atraumatic.      Nose: Nose normal.      Mouth/Throat:      Mouth: Mucous membranes are moist.   Eyes:      Conjunctiva/sclera: Conjunctivae normal.   Cardiovascular:      Rate and Rhythm: Regular rhythm. Tachycardia present.      Pulses: Normal pulses.   Pulmonary:      Effort: Pulmonary effort is normal.   Abdominal:      General: Bowel sounds are normal. There is no distension.      Tenderness: There is no abdominal tenderness.   Musculoskeletal:         General: Tenderness present. Normal range of motion.      Cervical back: Normal range of motion and neck supple.   Skin:     General: Skin is warm.      Capillary Refill: Capillary refill takes 2 to 3 seconds.      Coloration: Skin is not jaundiced.      Findings: No bruising.      Comments: BLE warmth, erythema with improved edema.  Sloughing of skin on R foot and wound L midfoot    Neurological:      Mental Status: She is alert and oriented to person, place, and  time.   Psychiatric:         Behavior: Behavior is hyperactive.       Significant Labs: All pertinent labs within the past 24 hours have been reviewed.    Significant Imaging: I have reviewed all pertinent imaging results/findings within the past 24 hours.      Assessment/Plan:      * Acute deep vein thrombosis (DVT) of both lower extremities  Hx of DVT/PE, hypercoagulable state, IVC filter in place  BLE venous US with thrombosis of bilateral posterior tibial veins    - Continue therapeutic lovenox    Anemia  Denies bleeding, baseline 12-13, 9.9 on admit  Iron panel: Iron 13, TIBC 456, Sat Iron 3, Transferrin and Ferritin WNL    - Iron supplement  - Continue to monitor with daily CBC    Diabetic foot ulcer associated with type 2 diabetes mellitus  B/l foot ulcers  - Podiatry consulted,  - Xray in feet bilaterally, findings consistent with Charcot joint of left foot, no acute abnormality   - MRI ordered edema noted as well as charcot changes of left foot without evidence of osteomyelitis  - LLE ulcer debrided with deep cultures taken.   - Site dressed with xeroform and kerlix. Nursing orders in for daily dressing changes  - She is NWB to LLE due to ulcer and history of charcot foot.  - Will continue to follow  - Cont clindamycin - culture growing Staph aureus, awaiting sensitivities.    SHAWN (obstructive sleep apnea)  Does not use CPAP    Psychosis  Bipolar 1 disorder    -Continue CEC from behavioral health facility  -Consulted psych for eval and medication management  - Cont Depakote and risperidone      Cellulitis of lower extremity  WBC 31, now resolved, lactic acid negative.  Given vanc in ED, switched to clindamycin.  BCx's NGTD.  Cellulitis is improving, difficult with b/l DVTs however will continue antibiotics with significant elevation of WBC.    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  Resumed home lisinopril.      VTE Risk Mitigation (From admission, onward)         Ordered     enoxaparin injection 100 mg  Every 12 hours (non-standard times)         04/14/22 0056     IP VTE HIGH RISK PATIENT  Once         04/13/22 0246     Place sequential compression device  Until discontinued         04/13/22 0246                      I have assessed these finding virtually using telemed platform and with assistance of bedside nurse                 The attending portion of this evaluation, treatment, and documentation was performed per Aparna Crow NP via Telemedicine AudioVisual using the secure [employer] software platform with 2 way audio/video. The provider was located off-site and the patient is located in the hospital. The aforementioned video software was utilized to document the relevant history and physical exam    Aparna Crow NP  Department of Hospital Medicine   [facility]

## 2022-04-17 LAB
ANION GAP SERPL CALC-SCNC: 14 MMOL/L (ref 8–16)
BASOPHILS # BLD AUTO: 0.11 K/UL (ref 0–0.2)
BASOPHILS NFR BLD: 0.8 % (ref 0–1.9)
BUN SERPL-MCNC: 13 MG/DL (ref 6–20)
CALCIUM SERPL-MCNC: 10.1 MG/DL (ref 8.7–10.5)
CHLORIDE SERPL-SCNC: 96 MMOL/L (ref 95–110)
CO2 SERPL-SCNC: 24 MMOL/L (ref 23–29)
CREAT SERPL-MCNC: 0.7 MG/DL (ref 0.5–1.4)
DIFFERENTIAL METHOD: ABNORMAL
EOSINOPHIL # BLD AUTO: 0.6 K/UL (ref 0–0.5)
EOSINOPHIL NFR BLD: 4.5 % (ref 0–8)
ERYTHROCYTE [DISTWIDTH] IN BLOOD BY AUTOMATED COUNT: 15.3 % (ref 11.5–14.5)
EST. GFR  (AFRICAN AMERICAN): >60 ML/MIN/1.73 M^2
EST. GFR  (NON AFRICAN AMERICAN): >60 ML/MIN/1.73 M^2
GLUCOSE SERPL-MCNC: 215 MG/DL (ref 70–110)
HCT VFR BLD AUTO: 32.1 % (ref 37–48.5)
HGB BLD-MCNC: 10.3 G/DL (ref 12–16)
IMM GRANULOCYTES # BLD AUTO: 0.53 K/UL (ref 0–0.04)
IMM GRANULOCYTES NFR BLD AUTO: 3.7 % (ref 0–0.5)
LYMPHOCYTES # BLD AUTO: 4.8 K/UL (ref 1–4.8)
LYMPHOCYTES NFR BLD: 33.3 % (ref 18–48)
MAGNESIUM SERPL-MCNC: 1.5 MG/DL (ref 1.6–2.6)
MCH RBC QN AUTO: 27.5 PG (ref 27–31)
MCHC RBC AUTO-ENTMCNC: 32.1 G/DL (ref 32–36)
MCV RBC AUTO: 86 FL (ref 82–98)
MONOCYTES # BLD AUTO: 1.5 K/UL (ref 0.3–1)
MONOCYTES NFR BLD: 10.1 % (ref 4–15)
NEUTROPHILS # BLD AUTO: 6.8 K/UL (ref 1.8–7.7)
NEUTROPHILS NFR BLD: 47.6 % (ref 38–73)
NRBC BLD-RTO: 0 /100 WBC
PHOSPHATE SERPL-MCNC: 4.3 MG/DL (ref 2.7–4.5)
PLATELET # BLD AUTO: 621 K/UL (ref 150–450)
PMV BLD AUTO: 9.9 FL (ref 9.2–12.9)
POCT GLUCOSE: 213 MG/DL (ref 70–110)
POCT GLUCOSE: 229 MG/DL (ref 70–110)
POCT GLUCOSE: 247 MG/DL (ref 70–110)
POTASSIUM SERPL-SCNC: 4.9 MMOL/L (ref 3.5–5.1)
RBC # BLD AUTO: 3.75 M/UL (ref 4–5.4)
SODIUM SERPL-SCNC: 134 MMOL/L (ref 136–145)
WBC # BLD AUTO: 14.34 K/UL (ref 3.9–12.7)

## 2022-04-17 PROCEDURE — 85025 COMPLETE CBC W/AUTO DIFF WBC: CPT | Performed by: NURSE PRACTITIONER

## 2022-04-17 PROCEDURE — 25000003 PHARM REV CODE 250: Performed by: HOSPITALIST

## 2022-04-17 PROCEDURE — 94640 AIRWAY INHALATION TREATMENT: CPT

## 2022-04-17 PROCEDURE — 25000003 PHARM REV CODE 250: Performed by: PHYSICIAN ASSISTANT

## 2022-04-17 PROCEDURE — 83735 ASSAY OF MAGNESIUM: CPT | Performed by: NURSE PRACTITIONER

## 2022-04-17 PROCEDURE — 63600175 PHARM REV CODE 636 W HCPCS: Performed by: HOSPITALIST

## 2022-04-17 PROCEDURE — 36415 COLL VENOUS BLD VENIPUNCTURE: CPT | Performed by: NURSE PRACTITIONER

## 2022-04-17 PROCEDURE — 11000001 HC ACUTE MED/SURG PRIVATE ROOM

## 2022-04-17 PROCEDURE — 99900035 HC TECH TIME PER 15 MIN (STAT)

## 2022-04-17 PROCEDURE — 25000003 PHARM REV CODE 250: Performed by: NURSE PRACTITIONER

## 2022-04-17 PROCEDURE — 80048 BASIC METABOLIC PNL TOTAL CA: CPT | Performed by: NURSE PRACTITIONER

## 2022-04-17 PROCEDURE — 96372 THER/PROPH/DIAG INJ SC/IM: CPT | Performed by: HOSPITALIST

## 2022-04-17 PROCEDURE — 84100 ASSAY OF PHOSPHORUS: CPT | Performed by: NURSE PRACTITIONER

## 2022-04-17 PROCEDURE — 94761 N-INVAS EAR/PLS OXIMETRY MLT: CPT

## 2022-04-17 PROCEDURE — G0378 HOSPITAL OBSERVATION PER HR: HCPCS

## 2022-04-17 PROCEDURE — 25000003 PHARM REV CODE 250: Performed by: PSYCHIATRY & NEUROLOGY

## 2022-04-17 RX ADMIN — FAMOTIDINE 20 MG: 20 TABLET ORAL at 10:04

## 2022-04-17 RX ADMIN — GABAPENTIN 300 MG: 300 CAPSULE ORAL at 08:04

## 2022-04-17 RX ADMIN — FAMOTIDINE 20 MG: 20 TABLET ORAL at 08:04

## 2022-04-17 RX ADMIN — HYDROCODONE BITARTRATE AND ACETAMINOPHEN 1 TABLET: 10; 325 TABLET ORAL at 08:04

## 2022-04-17 RX ADMIN — CLINDAMYCIN IN 5 PERCENT DEXTROSE 600 MG: 12 INJECTION, SOLUTION INTRAVENOUS at 01:04

## 2022-04-17 RX ADMIN — ENOXAPARIN SODIUM 100 MG: 100 INJECTION SUBCUTANEOUS at 08:04

## 2022-04-17 RX ADMIN — CLINDAMYCIN IN 5 PERCENT DEXTROSE 600 MG: 12 INJECTION, SOLUTION INTRAVENOUS at 10:04

## 2022-04-17 RX ADMIN — ASPIRIN 81 MG CHEWABLE TABLET 81 MG: 81 TABLET CHEWABLE at 08:04

## 2022-04-17 RX ADMIN — MICONAZOLE NITRATE 2 % TOPICAL POWDER: at 10:04

## 2022-04-17 RX ADMIN — DIVALPROEX SODIUM 500 MG: 250 TABLET, DELAYED RELEASE ORAL at 08:04

## 2022-04-17 RX ADMIN — ENOXAPARIN SODIUM 100 MG: 100 INJECTION SUBCUTANEOUS at 10:04

## 2022-04-17 RX ADMIN — HYDROXYZINE PAMOATE 50 MG: 25 CAPSULE ORAL at 05:04

## 2022-04-17 RX ADMIN — GABAPENTIN 300 MG: 300 CAPSULE ORAL at 10:04

## 2022-04-17 RX ADMIN — MICONAZOLE NITRATE 2 % TOPICAL POWDER: at 08:04

## 2022-04-17 RX ADMIN — INSULIN ASPART 4 UNITS: 100 INJECTION, SOLUTION INTRAVENOUS; SUBCUTANEOUS at 12:04

## 2022-04-17 RX ADMIN — ACETAMINOPHEN 650 MG: 325 TABLET ORAL at 03:04

## 2022-04-17 RX ADMIN — RISPERIDONE 2 MG: 1 TABLET, ORALLY DISINTEGRATING ORAL at 10:04

## 2022-04-17 RX ADMIN — INSULIN ASPART 2 UNITS: 100 INJECTION, SOLUTION INTRAVENOUS; SUBCUTANEOUS at 05:04

## 2022-04-17 RX ADMIN — INSULIN ASPART 4 UNITS: 100 INJECTION, SOLUTION INTRAVENOUS; SUBCUTANEOUS at 08:04

## 2022-04-17 RX ADMIN — INSULIN ASPART 2 UNITS: 100 INJECTION, SOLUTION INTRAVENOUS; SUBCUTANEOUS at 10:04

## 2022-04-17 RX ADMIN — FUROSEMIDE 20 MG: 20 TABLET ORAL at 08:04

## 2022-04-17 RX ADMIN — LISINOPRIL 10 MG: 10 TABLET ORAL at 08:04

## 2022-04-17 RX ADMIN — FLUTICASONE FUROATE AND VILANTEROL TRIFENATATE 1 PUFF: 100; 25 POWDER RESPIRATORY (INHALATION) at 08:04

## 2022-04-17 RX ADMIN — ACETAMINOPHEN 650 MG: 325 TABLET ORAL at 12:04

## 2022-04-17 RX ADMIN — CLINDAMYCIN IN 5 PERCENT DEXTROSE 600 MG: 12 INJECTION, SOLUTION INTRAVENOUS at 05:04

## 2022-04-17 RX ADMIN — PRAVASTATIN SODIUM 40 MG: 40 TABLET ORAL at 10:04

## 2022-04-17 RX ADMIN — DIVALPROEX SODIUM 1000 MG: 250 TABLET, DELAYED RELEASE ORAL at 10:04

## 2022-04-17 RX ADMIN — RISPERIDONE 2 MG: 1 TABLET, ORALLY DISINTEGRATING ORAL at 08:04

## 2022-04-17 RX ADMIN — HYDROCODONE BITARTRATE AND ACETAMINOPHEN 1 TABLET: 10; 325 TABLET ORAL at 10:04

## 2022-04-17 NOTE — PROGRESS NOTES
[facility] Medicine  Telemedicine Progress Note    Patient Name: Audrey Natarajan  MRN: [id number]  Patient Class: OP- Observation   Admission Date: 4/12/2022  Length of Stay: 0 days  Attending Physician: Sachi Carrizales MD  Primary Care Provider: Donaldo Pena MD          Subjective:     Principal Problem:Acute deep vein thrombosis (DVT) of both lower extremities        HPI:  Audrey Natarajan is a 50 yo female with a pmh of DM2, bipolar 1 disorder, cellulitis, COPD, HTN, CAD, DVT/PE. She presented to the ED with c/o fevers x 1 day. She also has BLE swelling and pain and wounds to both feet, worsening x 2 weeks. She had fever up to 102F yesterday. She reports the wounds to her right foot are from dragging her feet while she was angry. Denies drainage to foot wounds. She has had blood clots in the past and has an IVC filter placed in 2012. She was sent here from [facility] where she was under CEC for psychosis. ED workup revealed BLE DVT on venous US, WBC 31, and Hgb 9.9. She received a dose of vancomycin while in the ED.       Overview/Hospital Course:  Admitted for diabetic foot infection, cellulitis, and DVT.  Pt with h/o DVT/PE, hypercoagulable state, IVC filter in place. Therapeutic lovenox initiated.  Podiatry consulted for diabetic foot ulcer, debrided on 4/13 with cultures obtained - growing Staph aureus so far. On IV Clindamycin. Psych consulted for medication evaluation, pt with CEC from behavioral health facility.       Interval History: Patient seen via tele-med. Doing well this am. Will likely dc in the am.    Review of Systems   Constitutional:  Negative for activity change, appetite change and fatigue.   HENT:  Negative for trouble swallowing.    Respiratory:  Negative for shortness of breath.    Cardiovascular:  Negative for chest pain and leg swelling.   Gastrointestinal:  Negative for diarrhea, nausea and vomiting.   Genitourinary:  Negative for  difficulty urinating and hematuria.   Musculoskeletal:  Positive for myalgias.   Skin:  Positive for wound.   Hematological:  Does not bruise/bleed easily.   Psychiatric/Behavioral:  Negative for confusion. The patient is nervous/anxious and is hyperactive.    Objective:     Vital Signs (Most Recent):  Temp: 98.5 °F (36.9 °C) (04/17/22 0840)  Pulse: 100 (04/17/22 0842)  Resp: 18 (04/17/22 0842)  BP: 137/64 (04/17/22 0840)  SpO2: 98 % (04/17/22 0842)   Vital Signs (24h Range):  Temp:  [97.8 °F (36.6 °C)-98.9 °F (37.2 °C)] 98.5 °F (36.9 °C)  Pulse:  [] 100  Resp:  [18-20] 18  SpO2:  [96 %-99 %] 98 %  BP: ()/(52-67) 137/64     Weight: 107.8 kg (237 lb 10.5 oz)  Body mass index is 39.55 kg/m².    Intake/Output Summary (Last 24 hours) at 4/17/2022 1045  Last data filed at 4/17/2022 0925  Gross per 24 hour   Intake 1708 ml   Output 2600 ml   Net -892 ml        Physical Exam  Vitals and nursing note reviewed.   Constitutional:       Appearance: She is obese.   HENT:      Head: Normocephalic and atraumatic.      Nose: Nose normal.      Mouth/Throat:      Mouth: Mucous membranes are moist.   Eyes:      Conjunctiva/sclera: Conjunctivae normal.   Cardiovascular:      Rate and Rhythm: Regular rhythm. Tachycardia present.      Pulses: Normal pulses.   Pulmonary:      Effort: Pulmonary effort is normal.   Abdominal:      General: Bowel sounds are normal. There is no distension.      Tenderness: There is no abdominal tenderness.   Musculoskeletal:         General: Tenderness present. Normal range of motion.      Cervical back: Normal range of motion and neck supple.   Skin:     General: Skin is warm.      Capillary Refill: Capillary refill takes 2 to 3 seconds.      Coloration: Skin is not jaundiced.      Findings: No bruising.      Comments: BLE warmth, erythema with improved edema.  Sloughing of skin on R foot and wound L midfoot    Neurological:      Mental Status: She is alert and oriented to person, place, and  time.   Psychiatric:         Behavior: Behavior is hyperactive.       Significant Labs: All pertinent labs within the past 24 hours have been reviewed.    Significant Imaging: I have reviewed all pertinent imaging results/findings within the past 24 hours.      Assessment/Plan:      * Acute deep vein thrombosis (DVT) of both lower extremities  Hx of DVT/PE, hypercoagulable state, IVC filter in place  BLE venous US with thrombosis of bilateral posterior tibial veins    - Continue therapeutic lovenox    Anemia  Denies bleeding, baseline 12-13, 9.9 on admit  Iron panel: Iron 13, TIBC 456, Sat Iron 3, Transferrin and Ferritin WNL    - Iron supplement  - Continue to monitor with daily CBC    Diabetic foot ulcer associated with type 2 diabetes mellitus  B/l foot ulcers  - Podiatry consulted,  - Xray in feet bilaterally, findings consistent with Charcot joint of left foot, no acute abnormality   - MRI ordered edema noted as well as charcot changes of left foot without evidence of osteomyelitis  - LLE ulcer debrided with deep cultures taken.   - Site dressed with xeroform and kerlix. Nursing orders in for daily dressing changes  - She is NWB to LLE due to ulcer and history of charcot foot.  - Will continue to follow  - Cont clindamycin - culture growing Staph aureus--sensitive to clinda.    SHAWN (obstructive sleep apnea)  Does not use CPAP    Psychosis  Bipolar 1 disorder    -Continue CEC from behavioral health facility  -Consulted psych for eval and medication management  - Cont Depakote and risperidone      Cellulitis of lower extremity  WBC 31, now resolved, lactic acid negative.  Given vanc in ED, switched to clindamycin.  BCx's NGTD.  Cellulitis is improving, difficult with b/l DVTs however will continue antibiotics with significant elevation of WBC.    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  Resumed home lisinopril.      VTE Risk Mitigation (From admission, onward)         Ordered     enoxaparin injection 100 mg  Every 12 hours (non-standard times)         04/14/22 0056     IP VTE HIGH RISK PATIENT  Once         04/13/22 0246     Place sequential compression device  Until discontinued         04/13/22 0246                      I have assessed these finding virtually using telemed platform and with assistance of bedside nurse                 The attending portion of this evaluation, treatment, and documentation was performed per Aparna Crow NP via Telemedicine AudioVisual using the secure [employer] software platform with 2 way audio/video. The provider was located off-site and the patient is located in the hospital. The aforementioned video software was utilized to document the relevant history and physical exam    Aparna Crow NP  Department of Hospital Medicine   [facility]

## 2022-04-17 NOTE — NURSING
Care plan reviewed. Cardiac and glucose monitoring in place. Sitters remain at the bedside. IV antibiotics administered as ordered, tolerating well. Active during the day, ambulating in the room constantly getting out of bed into recliner and up walking around the room cleaning and straightening personal items. Pt frequently encouraged to sit and elevate BLE, pt doesn't always comply. Dressing to Bilateral feet changed as instructed and documented on flowsheet. Pain medication administered as ordered providing moderate relief. Safety maintained, bed at lowest position, wheels locked, room near nurses station.

## 2022-04-17 NOTE — ASSESSMENT & PLAN NOTE
B/l foot ulcers  - Podiatry consulted,  - Xray in feet bilaterally, findings consistent with Charcot joint of left foot, no acute abnormality   - MRI ordered edema noted as well as charcot changes of left foot without evidence of osteomyelitis  - [facility] ulcer debrided with deep cultures taken.   - Site dressed with xeroform and kerlix. Nursing orders in for daily dressing changes  - She is NWB to [facility] due to ulcer and history of charcot foot.  - Will continue to follow  - Cont clindamycin - culture growing Staph aureus--sensitive to clinda.

## 2022-04-17 NOTE — SUBJECTIVE & OBJECTIVE
Interval History: Patient seen via tele-med. Doing well this am. Will likely dc in the am.    Review of Systems   Constitutional:  Negative for activity change, appetite change and fatigue.   HENT:  Negative for trouble swallowing.    Respiratory:  Negative for shortness of breath.    Cardiovascular:  Negative for chest pain and leg swelling.   Gastrointestinal:  Negative for diarrhea, nausea and vomiting.   Genitourinary:  Negative for difficulty urinating and hematuria.   Musculoskeletal:  Positive for myalgias.   Skin:  Positive for wound.   Hematological:  Does not bruise/bleed easily.   Psychiatric/Behavioral:  Negative for confusion. The patient is nervous/anxious and is hyperactive.    Objective:     Vital Signs (Most Recent):  Temp: 98.5 °F (36.9 °C) (04/17/22 0840)  Pulse: 100 (04/17/22 0842)  Resp: 18 (04/17/22 0842)  BP: 137/64 (04/17/22 0840)  SpO2: 98 % (04/17/22 0842)   Vital Signs (24h Range):  Temp:  [97.8 °F (36.6 °C)-98.9 °F (37.2 °C)] 98.5 °F (36.9 °C)  Pulse:  [] 100  Resp:  [18-20] 18  SpO2:  [96 %-99 %] 98 %  BP: ()/(52-67) 137/64     Weight: 107.8 kg (237 lb 10.5 oz)  Body mass index is 39.55 kg/m².    Intake/Output Summary (Last 24 hours) at 4/17/2022 1045  Last data filed at 4/17/2022 0925  Gross per 24 hour   Intake 1708 ml   Output 2600 ml   Net -892 ml        Physical Exam  Vitals and nursing note reviewed.   Constitutional:       Appearance: She is obese.   HENT:      Head: Normocephalic and atraumatic.      Nose: Nose normal.      Mouth/Throat:      Mouth: Mucous membranes are moist.   Eyes:      Conjunctiva/sclera: Conjunctivae normal.   Cardiovascular:      Rate and Rhythm: Regular rhythm. Tachycardia present.      Pulses: Normal pulses.   Pulmonary:      Effort: Pulmonary effort is normal.   Abdominal:      General: Bowel sounds are normal. There is no distension.      Tenderness: There is no abdominal tenderness.   Musculoskeletal:         General: Tenderness present.  Normal range of motion.      Cervical back: Normal range of motion and neck supple.   Skin:     General: Skin is warm.      Capillary Refill: Capillary refill takes 2 to 3 seconds.      Coloration: Skin is not jaundiced.      Findings: No bruising.      Comments: BLE warmth, erythema with improved edema.  Sloughing of skin on R foot and wound L midfoot    Neurological:      Mental Status: She is alert and oriented to person, place, and time.   Psychiatric:         Behavior: Behavior is hyperactive.       Significant Labs: All pertinent labs within the past 24 hours have been reviewed.    Significant Imaging: I have reviewed all pertinent imaging results/findings within the past 24 hours.

## 2022-04-18 PROBLEM — A41.02 SEPSIS DUE TO METHICILLIN RESISTANT STAPHYLOCOCCUS AUREUS (MRSA): Status: ACTIVE | Noted: 2022-04-18

## 2022-04-18 LAB
ANION GAP SERPL CALC-SCNC: 14 MMOL/L (ref 8–16)
ANISOCYTOSIS BLD QL SMEAR: SLIGHT
BACTERIA BLD CULT: NORMAL
BACTERIA BLD CULT: NORMAL
BACTERIA SPEC ANAEROBE CULT: ABNORMAL
BASOPHILS # BLD AUTO: 0.12 K/UL (ref 0–0.2)
BASOPHILS NFR BLD: 0.7 % (ref 0–1.9)
BUN SERPL-MCNC: 19 MG/DL (ref 6–20)
BURR CELLS BLD QL SMEAR: ABNORMAL
CALCIUM SERPL-MCNC: 10.5 MG/DL (ref 8.7–10.5)
CHLORIDE SERPL-SCNC: 97 MMOL/L (ref 95–110)
CO2 SERPL-SCNC: 27 MMOL/L (ref 23–29)
CREAT SERPL-MCNC: 0.8 MG/DL (ref 0.5–1.4)
CRP SERPL-MCNC: 56.3 MG/L (ref 0–8.2)
DIFFERENTIAL METHOD: ABNORMAL
EOSINOPHIL # BLD AUTO: 0.5 K/UL (ref 0–0.5)
EOSINOPHIL NFR BLD: 3.3 % (ref 0–8)
ERYTHROCYTE [DISTWIDTH] IN BLOOD BY AUTOMATED COUNT: 15.4 % (ref 11.5–14.5)
ERYTHROCYTE [SEDIMENTATION RATE] IN BLOOD BY WESTERGREN METHOD: 96 MM/HR (ref 0–20)
EST. GFR  (AFRICAN AMERICAN): >60 ML/MIN/1.73 M^2
EST. GFR  (NON AFRICAN AMERICAN): >60 ML/MIN/1.73 M^2
GLUCOSE SERPL-MCNC: 237 MG/DL (ref 70–110)
HCT VFR BLD AUTO: 31.9 % (ref 37–48.5)
HGB BLD-MCNC: 10.5 G/DL (ref 12–16)
HYPOCHROMIA BLD QL SMEAR: ABNORMAL
IMM GRANULOCYTES # BLD AUTO: 0.73 K/UL (ref 0–0.04)
IMM GRANULOCYTES NFR BLD AUTO: 4.5 % (ref 0–0.5)
LACTATE SERPL-SCNC: 1.3 MMOL/L (ref 0.5–2.2)
LYMPHOCYTES # BLD AUTO: 4.9 K/UL (ref 1–4.8)
LYMPHOCYTES NFR BLD: 29.8 % (ref 18–48)
MCH RBC QN AUTO: 27.5 PG (ref 27–31)
MCHC RBC AUTO-ENTMCNC: 32.9 G/DL (ref 32–36)
MCV RBC AUTO: 84 FL (ref 82–98)
MONOCYTES # BLD AUTO: 1.4 K/UL (ref 0.3–1)
MONOCYTES NFR BLD: 8.4 % (ref 4–15)
NEUTROPHILS # BLD AUTO: 8.7 K/UL (ref 1.8–7.7)
NEUTROPHILS NFR BLD: 53.3 % (ref 38–73)
NRBC BLD-RTO: 0 /100 WBC
OVALOCYTES BLD QL SMEAR: ABNORMAL
PLATELET # BLD AUTO: 683 K/UL (ref 150–450)
PMV BLD AUTO: 10 FL (ref 9.2–12.9)
POCT GLUCOSE: 165 MG/DL (ref 70–110)
POCT GLUCOSE: 189 MG/DL (ref 70–110)
POCT GLUCOSE: 234 MG/DL (ref 70–110)
POCT GLUCOSE: 245 MG/DL (ref 70–110)
POIKILOCYTOSIS BLD QL SMEAR: SLIGHT
POTASSIUM SERPL-SCNC: 4.4 MMOL/L (ref 3.5–5.1)
RBC # BLD AUTO: 3.82 M/UL (ref 4–5.4)
SARS-COV-2 RDRP RESP QL NAA+PROBE: NEGATIVE
SODIUM SERPL-SCNC: 138 MMOL/L (ref 136–145)
TARGETS BLD QL SMEAR: ABNORMAL
WBC # BLD AUTO: 16.31 K/UL (ref 3.9–12.7)

## 2022-04-18 PROCEDURE — 83605 ASSAY OF LACTIC ACID: CPT | Performed by: HOSPITALIST

## 2022-04-18 PROCEDURE — 87040 BLOOD CULTURE FOR BACTERIA: CPT | Mod: 59 | Performed by: HOSPITALIST

## 2022-04-18 PROCEDURE — 99233 PR SUBSEQUENT HOSPITAL CARE,LEVL III: ICD-10-PCS | Mod: AF,HB,, | Performed by: PSYCHIATRY & NEUROLOGY

## 2022-04-18 PROCEDURE — 99233 SBSQ HOSP IP/OBS HIGH 50: CPT | Mod: AF,HB,, | Performed by: PSYCHIATRY & NEUROLOGY

## 2022-04-18 PROCEDURE — 25000003 PHARM REV CODE 250: Performed by: PSYCHIATRY & NEUROLOGY

## 2022-04-18 PROCEDURE — 85025 COMPLETE CBC W/AUTO DIFF WBC: CPT | Performed by: HOSPITALIST

## 2022-04-18 PROCEDURE — U0002 COVID-19 LAB TEST NON-CDC: HCPCS | Performed by: HOSPITALIST

## 2022-04-18 PROCEDURE — 90833 PSYTX W PT W E/M 30 MIN: CPT | Mod: AF,HB,, | Performed by: PSYCHIATRY & NEUROLOGY

## 2022-04-18 PROCEDURE — 36415 COLL VENOUS BLD VENIPUNCTURE: CPT | Performed by: HOSPITALIST

## 2022-04-18 PROCEDURE — 80048 BASIC METABOLIC PNL TOTAL CA: CPT | Performed by: HOSPITALIST

## 2022-04-18 PROCEDURE — 86140 C-REACTIVE PROTEIN: CPT | Performed by: HOSPITALIST

## 2022-04-18 PROCEDURE — 63600175 PHARM REV CODE 636 W HCPCS: Performed by: HOSPITALIST

## 2022-04-18 PROCEDURE — 94761 N-INVAS EAR/PLS OXIMETRY MLT: CPT

## 2022-04-18 PROCEDURE — 90833 PR PSYCHOTHERAPY W/PATIENT W/E&M, 30 MIN (ADD ON): ICD-10-PCS | Mod: AF,HB,, | Performed by: PSYCHIATRY & NEUROLOGY

## 2022-04-18 PROCEDURE — 99900035 HC TECH TIME PER 15 MIN (STAT)

## 2022-04-18 PROCEDURE — 27000207 HC ISOLATION

## 2022-04-18 PROCEDURE — 11000001 HC ACUTE MED/SURG PRIVATE ROOM

## 2022-04-18 PROCEDURE — 25000003 PHARM REV CODE 250: Performed by: PHYSICIAN ASSISTANT

## 2022-04-18 PROCEDURE — 25000003 PHARM REV CODE 250: Performed by: HOSPITALIST

## 2022-04-18 PROCEDURE — 25000003 PHARM REV CODE 250: Performed by: NURSE PRACTITIONER

## 2022-04-18 PROCEDURE — 25000003 PHARM REV CODE 250

## 2022-04-18 PROCEDURE — 93010 ELECTROCARDIOGRAM REPORT: CPT | Mod: ,,, | Performed by: INTERNAL MEDICINE

## 2022-04-18 PROCEDURE — 93010 EKG 12-LEAD: ICD-10-PCS | Mod: ,,, | Performed by: INTERNAL MEDICINE

## 2022-04-18 PROCEDURE — 85652 RBC SED RATE AUTOMATED: CPT | Performed by: HOSPITALIST

## 2022-04-18 PROCEDURE — 96372 THER/PROPH/DIAG INJ SC/IM: CPT | Performed by: HOSPITALIST

## 2022-04-18 PROCEDURE — 94640 AIRWAY INHALATION TREATMENT: CPT

## 2022-04-18 PROCEDURE — 93005 ELECTROCARDIOGRAM TRACING: CPT

## 2022-04-18 RX ORDER — ENOXAPARIN SODIUM 100 MG/ML
1 INJECTION SUBCUTANEOUS EVERY 12 HOURS
Start: 2022-04-18 | End: 2022-04-26 | Stop reason: HOSPADM

## 2022-04-18 RX ORDER — SULFAMETHOXAZOLE AND TRIMETHOPRIM 800; 160 MG/1; MG/1
2 TABLET ORAL 2 TIMES DAILY
Qty: 40 TABLET | Refills: 0
Start: 2022-04-18 | End: 2022-04-25 | Stop reason: HOSPADM

## 2022-04-18 RX ORDER — SULFAMETHOXAZOLE AND TRIMETHOPRIM 800; 160 MG/1; MG/1
2 TABLET ORAL 2 TIMES DAILY
Status: DISCONTINUED | OUTPATIENT
Start: 2022-04-18 | End: 2022-04-20

## 2022-04-18 RX ORDER — DIVALPROEX SODIUM 500 MG/1
500 TABLET, DELAYED RELEASE ORAL DAILY
Qty: 30 TABLET | Refills: 11
Start: 2022-04-19 | End: 2022-04-25 | Stop reason: HOSPADM

## 2022-04-18 RX ORDER — DIVALPROEX SODIUM 500 MG/1
1000 TABLET, DELAYED RELEASE ORAL NIGHTLY
Qty: 60 TABLET | Refills: 11
Start: 2022-04-18 | End: 2022-04-25 | Stop reason: SDUPTHER

## 2022-04-18 RX ORDER — METRONIDAZOLE 500 MG/1
500 TABLET ORAL EVERY 8 HOURS
Status: DISCONTINUED | OUTPATIENT
Start: 2022-04-18 | End: 2022-04-26 | Stop reason: HOSPADM

## 2022-04-18 RX ORDER — RISPERIDONE 2 MG/1
2 TABLET, ORALLY DISINTEGRATING ORAL 2 TIMES DAILY
Qty: 60 TABLET | Refills: 11
Start: 2022-04-18 | End: 2022-04-25 | Stop reason: HOSPADM

## 2022-04-18 RX ORDER — HYDROXYZINE PAMOATE 50 MG/1
50 CAPSULE ORAL EVERY 8 HOURS PRN
Start: 2022-04-18 | End: 2022-04-26 | Stop reason: HOSPADM

## 2022-04-18 RX ADMIN — FUROSEMIDE 20 MG: 20 TABLET ORAL at 08:04

## 2022-04-18 RX ADMIN — GABAPENTIN 300 MG: 300 CAPSULE ORAL at 08:04

## 2022-04-18 RX ADMIN — HYDROCODONE BITARTRATE AND ACETAMINOPHEN 1 TABLET: 10; 325 TABLET ORAL at 10:04

## 2022-04-18 RX ADMIN — FAMOTIDINE 20 MG: 20 TABLET ORAL at 08:04

## 2022-04-18 RX ADMIN — SULFAMETHOXAZOLE AND TRIMETHOPRIM 2 TABLET: 800; 160 TABLET ORAL at 08:04

## 2022-04-18 RX ADMIN — INSULIN ASPART 6 UNITS: 100 INJECTION, SOLUTION INTRAVENOUS; SUBCUTANEOUS at 05:04

## 2022-04-18 RX ADMIN — INSULIN ASPART 1 UNITS: 100 INJECTION, SOLUTION INTRAVENOUS; SUBCUTANEOUS at 08:04

## 2022-04-18 RX ADMIN — METRONIDAZOLE 500 MG: 500 TABLET ORAL at 08:04

## 2022-04-18 RX ADMIN — DIVALPROEX SODIUM 1000 MG: 250 TABLET, DELAYED RELEASE ORAL at 10:04

## 2022-04-18 RX ADMIN — HYDROXYZINE PAMOATE 50 MG: 25 CAPSULE ORAL at 10:04

## 2022-04-18 RX ADMIN — ASPIRIN 81 MG CHEWABLE TABLET 81 MG: 81 TABLET CHEWABLE at 08:04

## 2022-04-18 RX ADMIN — INSULIN ASPART 4 UNITS: 100 INJECTION, SOLUTION INTRAVENOUS; SUBCUTANEOUS at 12:04

## 2022-04-18 RX ADMIN — RISPERIDONE 2 MG: 1 TABLET, ORALLY DISINTEGRATING ORAL at 08:04

## 2022-04-18 RX ADMIN — APIXABAN 10 MG: 5 TABLET, FILM COATED ORAL at 07:04

## 2022-04-18 RX ADMIN — PRAVASTATIN SODIUM 40 MG: 40 TABLET ORAL at 08:04

## 2022-04-18 RX ADMIN — ACETAMINOPHEN 650 MG: 325 TABLET ORAL at 04:04

## 2022-04-18 RX ADMIN — LISINOPRIL 10 MG: 10 TABLET ORAL at 08:04

## 2022-04-18 RX ADMIN — MICONAZOLE NITRATE 2 % TOPICAL POWDER: at 08:04

## 2022-04-18 RX ADMIN — DIVALPROEX SODIUM 500 MG: 250 TABLET, DELAYED RELEASE ORAL at 08:04

## 2022-04-18 RX ADMIN — FLUTICASONE FUROATE AND VILANTEROL TRIFENATATE 1 PUFF: 100; 25 POWDER RESPIRATORY (INHALATION) at 08:04

## 2022-04-18 RX ADMIN — INSULIN ASPART 4 UNITS: 100 INJECTION, SOLUTION INTRAVENOUS; SUBCUTANEOUS at 08:04

## 2022-04-18 RX ADMIN — ACETAMINOPHEN 650 MG: 325 TABLET ORAL at 05:04

## 2022-04-18 RX ADMIN — FERROUS SULFATE TAB EC 325 MG (65 MG FE EQUIVALENT) 1 EACH: 325 (65 FE) TABLET DELAYED RESPONSE at 08:04

## 2022-04-18 RX ADMIN — CLINDAMYCIN IN 5 PERCENT DEXTROSE 600 MG: 12 INJECTION, SOLUTION INTRAVENOUS at 04:04

## 2022-04-18 RX ADMIN — HYDROXYZINE PAMOATE 50 MG: 25 CAPSULE ORAL at 08:04

## 2022-04-18 RX ADMIN — SULFAMETHOXAZOLE AND TRIMETHOPRIM 2 TABLET: 800; 160 TABLET ORAL at 01:04

## 2022-04-18 NOTE — PLAN OF CARE
[facility]    FACILITY TRANSFER ORDERS      Patient Name: Audrey Natarajan  YOB: 1970    PCP: Donaldo Pena MD   PCP Address: [address]  PCP Phone Number: [phone]  PCP Fax: [phone]    Encounter Date: 04/18/2022    Admit to: Inpatient psychiatry hospital     Vital Signs:  Routine    Diagnoses:   Active Hospital Problems    Diagnosis  POA    *Acute deep vein thrombosis (DVT) of both lower extremities [I82.403]  Yes    Diabetic foot ulcer associated with type 2 diabetes mellitus [E11.621, L97.509]  Yes    Anemia [D64.9]  Yes    SHAWN (obstructive sleep apnea) [G47.33]  Yes     -diagnosed many years ago.  Lost pap device from evacuation.  Record of prior sleep study not available.  Will set up with hsat.  Possible apap after sleep study      Psychosis [F29]  Yes    Cellulitis of lower extremity [L03.119]  Yes    Bipolar 1 disorder [F31.9]  Yes     Chronic    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 [E11.40]  Yes    COPD (chronic obstructive pulmonary disease) [J44.9]  Yes     Chronic    Essential hypertension [I10]  Yes     Chronic      Resolved Hospital Problems   No resolved problems to display.       Allergies:  Review of patient's allergies indicates:   Allergen Reactions    Morphine Other (See Comments)     Patient had a psychotic episode after taking Morphine  Agitation, hallucinations    Penicillins Anaphylaxis     itching    Januvia [sitagliptin] Hives       Diet: diabetic diet: 2000 calorie    Activities: Activity as tolerated    Nursing: routine     Labs: as per facility protocol      WOUND CARE ORDERS    Nursing to dress bilateral foot wounds with saline moistened hydrafera blue classic, followed by 4x4, and foot ball dressing with kerlix x2 and secure with light ACE.    Medications: Review discharge medications with patient and family and provide education.      Current Discharge Medication List      START taking  these medications    Details   !! divalproex (DEPAKOTE) 500 MG TbEC Take 1 tablet (500 mg total) by mouth once daily.  Qty: 30 tablet, Refills: 11      !! divalproex (DEPAKOTE) 500 MG TbEC Take 2 tablets (1,000 mg total) by mouth every evening.  Qty: 60 tablet, Refills: 11      enoxaparin (LOVENOX) 100 mg/mL Syrg Inject 1 mL (100 mg total) into the skin every 12 (twelve) hours.      hydrOXYzine pamoate (VISTARIL) 50 MG Cap Take 1 capsule (50 mg total) by mouth every 8 (eight) hours as needed (insomnia).      risperiDONE (RISPERDAL M-TABS) 2 MG disintegrating tablet Take 1 tablet (2 mg total) by mouth 2 (two) times daily.  Qty: 60 tablet, Refills: 11      sulfamethoxazole-trimethoprim 800-160mg (BACTRIM DS) 800-160 mg Tab Take 2 tablets by mouth 2 (two) times daily. for 10 days  Qty: 40 tablet, Refills: 0       !! - Potential duplicate medications found. Please discuss with provider.      CONTINUE these medications which have NOT CHANGED    Details   aspirin 81 MG Chew Take 1 tablet (81 mg total) by mouth once daily.  Qty: 30 tablet, Refills: 11      DUPIXENT  mg/2 mL PnIj SMARTSI Milligram(s) SUB-Q Every 2 Weeks      famotidine (PEPCID) 20 MG tablet Take 20 mg by mouth 2 (two) times daily.      fluticasone-salmeterol diskus inhaler 250-50 mcg Inhale 1 puff into the lungs 2 (two) times daily. Controller  Qty: 60 each, Refills: 2    Associated Diagnoses: Chronic obstructive pulmonary disease, unspecified COPD type      furosemide (LASIX) 20 MG tablet TAKE 1 TABLET(20 MG) BY MOUTH EVERY DAY  Qty: 90 tablet, Refills: 1      gabapentin (NEURONTIN) 300 MG capsule TAKE 2 CAPSULES(600 MG) BY MOUTH TWICE DAILY  Qty: 120 capsule, Refills: 2      hydrOXYzine (ATARAX) 50 MG tablet Take 50 mg by mouth 4 (four) times daily as needed.      ibuprofen (ADVIL,MOTRIN) 600 MG tablet TAKE 1 TABLET(600 MG) BY MOUTH EVERY 8 HOURS AS NEEDED FOR PAIN OR BACK PAIN  Qty: 90 tablet, Refills: 0      lisinopriL 10 MG tablet Take 1  tablet (10 mg total) by mouth once daily.  Qty: 90 tablet, Refills: 3    Associated Diagnoses: Essential hypertension; Type 2 diabetes mellitus with diabetic polyneuropathy, without long-term current use of insulin      loratadine (CLARITIN) 10 mg tablet Take 1 tablet (10 mg total) by mouth once daily.  Qty: 60 tablet, Refills: 0      metFORMIN (GLUCOPHAGE) 1000 MG tablet TAKE 1 TABLET(1000 MG) BY MOUTH TWICE DAILY WITH MEALS  Qty: 180 tablet, Refills: 2    Associated Diagnoses: Type 2 diabetes mellitus with diabetic polyneuropathy, without long-term current use of insulin      metoprolol tartrate (LOPRESSOR) 50 MG tablet TAKE 1 TABLET(50 MG) BY MOUTH TWICE DAILY  Qty: 180 tablet, Refills: 2    Associated Diagnoses: Essential hypertension      pravastatin (PRAVACHOL) 40 MG tablet TAKE 1 TABLET(40 MG) BY MOUTH EVERY EVENING  Qty: 90 tablet, Refills: 3    Associated Diagnoses: Type 2 diabetes mellitus with diabetic polyneuropathy, without long-term current use of insulin      acetaminophen (TYLENOL) 500 MG tablet Take 2 tablets (1,000 mg total) by mouth every 6 (six) hours as needed for Pain.  Qty: 30 tablet, Refills: 0      albuterol (PROVENTIL/VENTOLIN HFA) 90 mcg/actuation inhaler Inhale 2 puffs into the lungs every 6 (six) hours as needed for Wheezing. Use with spacer  Dispense with 1 spacer  Qty: 18 g, Refills: 0      albuterol-ipratropium (DUO-NEB) 2.5 mg-0.5 mg/3 mL nebulizer solution Take 3 mLs by nebulization every 6 (six) hours as needed for Wheezing or Shortness of Breath. Rescue  Qty: 1 Box, Refills: 0    Associated Diagnoses: Chronic obstructive pulmonary disease, unspecified COPD type      aluminum-magnesium hydroxide-simethicone (MAALOX) 200-200-20 mg/5 mL Susp Take 30 mLs by mouth every 6 (six) hours as needed (indigestion).  Qty: 150 mL, Refills: 2      blood sugar diagnostic Strp To check BG two  times daily, to use with insurance preferred meter  Qty: 100 each, Refills: 1    Associated Diagnoses:  Type 2 diabetes mellitus with diabetic polyneuropathy, without long-term current use of insulin      !! blood-glucose meter kit To check BG once daily, to use with insurance preferred meter  Qty: 1 each, Refills: 0    Comments: Any brand covered by insurance  Associated Diagnoses: Type 2 diabetes mellitus without complication, without long-term current use of insulin      !! blood-glucose meter kit To check BG two times daily, to use with insurance preferred meter  Qty: 1 each, Refills: 0    Associated Diagnoses: Type 2 diabetes mellitus with diabetic polyneuropathy, without long-term current use of insulin      dicyclomine (BENTYL) 20 mg tablet Take 1 tablet (20 mg total) by mouth every 6 (six) hours.  Qty: 30 tablet, Refills: 0    Associated Diagnoses: Acute gastroenteritis      EPITOL 200 mg tablet       fluticasone propionate (FLONASE) 50 mcg/actuation nasal spray 1 spray (50 mcg total) by Each Nostril route 2 (two) times daily.  Qty: 16 g, Refills: 0      folic acid (FOLVITE) 1 MG tablet Take 1 tablet (1 mg total) by mouth once daily.  Qty: 30 tablet, Refills: 2      lancets Misc To check BG two times daily, to use with insurance preferred meter  Qty: 100 each, Refills: 1    Associated Diagnoses: Type 2 diabetes mellitus with diabetic polyneuropathy, without long-term current use of insulin      multivitamin Tab Take 1 tablet by mouth once daily.  Qty: 30 tablet, Refills: 2      OPTICHAMBER BIGG LG MASK Spcr Inhale into the lungs.      pantoprazole (PROTONIX) 40 MG tablet Take 1 tablet (40 mg total) by mouth once daily.  Qty: 30 tablet, Refills: 0      polyvinyl alcohol, artificial tears, (LIQUIFILM TEARS) 1.4 % ophthalmic solution Place 1 drop into both eyes 4 (four) times daily.  Qty: 15 mL, Refills: 2      senna (SENOKOT) 8.6 mg tablet Take 1 tablet by mouth 2 (two) times a day.  Qty: 60 tablet, Refills: 2      TRUE METRIX GLUCOSE METER Misc CHECK BLOOD SUGAR TWICE DAILY  Qty: 1 each, Refills: 0       !!  - Potential duplicate medications found. Please discuss with provider.      STOP taking these medications       methocarbamoL (ROBAXIN) 500 MG Tab Comments:   Reason for Stopping:         risperiDONE (RISPERDAL) 4 MG tablet Comments:   Reason for Stopping:         VYVANSE 40 mg Cap Comments:   Reason for Stopping:         albuterol sulfate 2.5 mg/0.5 mL Nebu Comments:   Reason for Stopping:         ammonium lactate (LAC-HYDRIN) 12 % lotion Comments:   Reason for Stopping:         diclofenac sodium (VOLTAREN) 1 % Gel Comments:   Reason for Stopping:         divalproex ER (DEPAKOTE) 500 MG Tb24 Comments:   Reason for Stopping:         haloperidoL (HALDOL) 10 MG tablet Comments:   Reason for Stopping:         metronidazole 1% (METROGEL) 1 % Gel Comments:   Reason for Stopping:         mupirocin (BACTROBAN) 2 % ointment Comments:   Reason for Stopping:         nystatin (NYSTOP) powder Comments:   Reason for Stopping:         QUEtiapine (SEROQUEL) 200 MG Tab Comments:   Reason for Stopping:         triamcinolone acetonide 0.1% (KENALOG) 0.1 % ointment Comments:   Reason for Stopping:         white petrolatum 86.5 % Oint Comments:   Reason for Stopping:                  Immunizations Administered as of 4/18/2022  Reviewed on 4/15/2022    No immunizations on file.          End Date for Bactrim: 4/28/22    _________________________________  Diego Ridley MD  04/18/2022

## 2022-04-18 NOTE — DISCHARGE INSTRUCTIONS
Wound care instructions: Dressing changes 3x per week: cleanse wounds on both feet with saline, apply hydrofera blue, wrap both feet with cast padding followed by coban    Protected weight bearing in right lower extremity in surgical shoe. Non-weight bearing in left lower extremity when possible due to ulcer, Charcot and heel weight bearing for transfers    Take doxycyline twice a day and metronidazole every 8 hours until 5/4/2022. Follow up with PCP and podiatry

## 2022-04-18 NOTE — PLAN OF CARE
VN note: VN completed AVS and attachments and notified bedside nurse, Angelina. Will cont to be available and intervene prn.

## 2022-04-18 NOTE — NURSING
notified provider, patient agitated, continously asking to be discharged to psych, verbally agressive with staff at times, will give prn and reassess.

## 2022-04-18 NOTE — PLAN OF CARE
Future Appointments   Date Time Provider Department Center   4/28/2022  2:00 PM Donaldo Pena [place] [facility] [place] [facility]   4/28/2022  2:30 PM [place]ley Palomo [facility] [place]   5/9/2022  2:00 PM Salnoi De Anda MD [facility] [place] [facility]   5/26/2022  3:00 PM Maira De Los Santos, [facility] Amanda Root RN    [phone]

## 2022-04-18 NOTE — PROGRESS NOTES
PSYCHIATRY INPATIENT PROGRESS NOTE  SUBSEQUENT HOSPITAL VISIT      4/18/2022 11:42 AM   Audrey Natarajan   1970   [id number]           DATE OF ADMISSION: 4/12/2022 11:28 PM    SITE: [facility]    CURRENT LEGAL STATUS: PEC/CEC    HISTORY    Per Initial History from Primary Team:   Audrey Natarajan is a 52 yo female with a pmh of DM2, bipolar 1 disorder, cellulitis, COPD, HTN, CAD, DVT/PE. She presented to the ED with c/o fevers x 1 day. She also has BLE swelling and pain and wounds to both feet, worsening x 2 weeks. She had fever up to 102F yesterday. She reports the wounds to her right foot are from dragging her feet while she was angry. Denies drainage to foot wounds. She has had blood clots in the past and has an IVC filter placed in 2012. She was sent here from [facility] where she was under CEC for psychosis. ED workup revealed BLE DVT on venous US, WBC 31, and Hgb 9.9. She received a dose of vancomycin while in the ED.   Overview/Hospital Course on 04/17/22:  Admitted for diabetic foot infection, cellulitis, and DVT.  Pt with h/o DVT/PE, hypercoagulable state, IVC filter in place. Therapeutic lovenox initiated.  Podiatry consulted for diabetic foot ulcer, debrided on 4/13 with cultures obtained - growing Staph aureus so far. On IV Clindamycin. Psych consulted for medication evaluation, pt with CEC from behavioral health facility.   Interval History: Patient seen via tele-med. Doing well this am. Will likely dc in the am.       Chief Complaint / Reason for Original Psychiatry Consult: Psychosis / Bipolar I Disorder; Patient from [facility] on PEC/CEC       Subjective / Interval Psychiatric History Today (04/18/2022) (with psychiatric ROS below):   Audrey Natarajan is a 51 y.o. female with a past medical history as noted above/below, and a past psychiatric history of Bipolar I Disorder, psychosis, depression, and ADHD, currently being treated by her inpatient primary team for a  principle problem of acute DVTs of BLEs.  Psychiatry was originally consulted as noted above.  The patient was seen and examined again this AM.  The chart was reviewed again this AM.  On examination today, the patient was alert and oriented to person, place, city, state, month, year, and situation.  She was CAM-ICU negative for delirium.  She continues to appear with paranoia, racing thoughts, loosening of associations, pressured/rapid speech, and tangential / disorganized TP.  She endorses trouble with sleep overnight (unable to quantify despite multiple attempts).  She endorses a good appetite.  She denies AH, VH, or TH (no RIS observed).  She denies any current/recent passive/active SI/HI.  She denies any adverse effects to her current medication regimen.  Regarding current medical/physical complaints, she endorses diffuse MSK pain and mild headache.  She denies any other medical complaints at this time.  NAD was observed during the examination.  Psychotherapy was again implemented with a focus on improving mood / bibi / thought process and sleep.  See detailed psych ROS below.  See A/P below.          Psychiatric Review Of Systems - Currently, the patient is endorsing and/or denying the following:  (patient's endorsements are BOLDED below; if not BOLDED, then patient denied):     Endorses Symptoms of Depression: diminished mood, low motivation, loss of interest/anhedonia, irritability, diminished energy, change in sleep, change in appetite, diminished concentration or cognition or indecisiveness, PMA/R, excessive guilt or hopelessness or worthlessness, suicidal ideations     Endorses issues with Sleep: initiation, maintenance, early morning awakening with inability to return to sleep     Denies Suicidal/Homicidal ideations: active/passive ideations, organized plans, future intentions     Endorses Symptoms of psychosis: paranoia, hallucinations, delusions, disorganized thinking, disorganized behavior or abnormal  motor behavior, or negative symptoms (diminshed emotional expression, avolition, anhedonia, alogia, asociality      Endorses Symptoms of bibi or hypomania: elevated, expansive, or irritable mood with increased energy or activity; with inflated self-esteem or grandiosity, decreased need for sleep, increased rate of speech, FOI or racing thoughts, distractibility, increased goal directed activity or PMA, risky/disinhibited behavior     Denies Symptoms of MALISSA: excessive anxiety/worry/fear, more days than not, about numerous issues, difficult to control, with restlessness, fatigue, poor concentration, irritability, muscle tension, sleep disturbance; causes functionally impairing distress      Denies Symptoms of Panic Disorder: recurrent panic attacks, precipitated or un-precipitated, source of worry and/or behavioral changes secondary; with or without agoraphobia     Denies Symptoms of PTSD: h/o trauma; re-experiencing/intrusive symptoms, avoidant behavior, negative alterations in cognition or mood, or hyperarousal symptoms; with or without dissociative symptoms      Denies Symptoms of OCD: obsessions or compulsions      Denies Symptoms of Eating Disorders: anorexia, bulimia or binging     Denies Substance Use: intoxication, withdrawal, tolerance, used in larger amounts or duration than intended, unsuccessful attempts to limit or quit, increased time engaging in or seeking out, cravings or strong desire to use, failure to fulfill obligations, negative consequences in social/interpersonal/occupational,/recreational areas, use in dangerous situations, medical or psychological consequences         PSYCHOTHERAPY ADD-ON +[id number]   30 (16-37*) minutes     Time: 18 minutes  Participants: Met with patient     Therapeutic Intervention Type: behavior modifying psychotherapy, supportive psychotherapy  Why chosen therapy is appropriate versus another modality: relevant to diagnosis, patient responds to this modality, evidence based  practice     Target symptoms: mood / bibi / TERRI and insomnia   Primary focus: improving mood / bibi / thought process and sleep  Psychotherapeutic techniques: supportive and psychodynamic techniques; psycho-education; deep breathing exercises; CBT; problem solving techniques and managing life stressors     Outcome monitoring methods: self-report, observation     Patient's response to intervention:  The patient's response to intervention is accepting / limited      Progress toward goals:  The patient's progress toward goals is fair / limited         ROS  General ROS: negative for - chills, fatigue, fever or night sweats  Ophthalmic ROS: negative for - blurry vision, double vision or eye pain  ENT ROS: negative for - sinus pain, sore throat or visual changes; positive for mild headache  Allergy and Immunology ROS: negative for - hives, itchy/watery eyes or nasal congestion  Hematological and Lymphatic ROS: negative for - bleeding problems, bruising, jaundice or pallor  Endocrine ROS: negative for - galactorrhea, hot flashes, mood swings, palpitations or temperature intolerance  Respiratory ROS: negative for - cough, hemoptysis, shortness of breath, tachypnea or wheezing  Cardiovascular ROS: negative for - chest pain, dyspnea on exertion, loss of consciousness, palpitations, rapid heart rate or shortness of breath  Gastrointestinal ROS: negative for - appetite loss, nausea, abdominal pain, blood in stools, change in bowel habits, constipation or diarrhea  Genito-Urinary ROS: negative for - incontinence, nocturia or pelvic pain  Musculoskeletal ROS: negative for - joint stiffness; positive for diffuse MSK pain / discomfort   Neurological ROS: negative for - behavioral changes, confusion, dizziness, memory loss, numbness/tingling or seizures  Dermatological ROS: negative for dry skin, hair changes, pruritus or rash; positive for color change / wound (improving)  Psychiatric ROS: see detailed psychiatric ROS above in  "subjective section        PAST MEDICAL & SURGICAL HISTORY   Past Medical History:   Diagnosis Date    ADHD (attention deficit hyperactivity disorder)     Arthritis     Asthma     Bipolar 1 disorder     Cataract     COPD (chronic obstructive pulmonary disease)     Coronary artery disease     A fib    Depression     bipolar manic depresson    Diabetes mellitus     DVT of lower extremity, bilateral July 2013    bilateral LE DVT. [place] filter placed.     Encounter for blood transfusion     History of blood clots 1. Left Leg=2003; 2.Bilateral Groin=Blood Clots= 5 or 6/ 2013 & 7/2013; 3. LLL of Lung=7/2013;  4. Lt. Lower Leg=7/2013.     Pt. had 1st Blood Clot after Efubusqpydza=8890, & Last=2013. [place] Filter= Rt.Lateral Neck.    HTN (hypertension) 6/6/2013    Pt states that she does not have hypertension    Hypercholesteremia     Irregular heartbeat     Neuromuscular disorder     neuropathy feet    PE (pulmonary embolism) July 2013     bilat LE DVT.     Restless leg syndrome      Past Surgical History:   Procedure Laterality Date    ABDOMINAL SURGERY  2010    gastric sleeve    BILATERAL OOPHORECTOMY Bilateral 1/12/2015    CHOLECYSTECTOMY      Green' s filter Right 7/4/2012    Right Neck & Tunneled Down.    HERNIA REPAIR      "[place] [facility] [employer] around th [place].", pt. states    LAPAROSCOPIC CHOLECYSTECTOMY N/A 9/10/2020    Procedure: CHOLECYSTECTOMY, LAPAROSCOPIC;  Surgeon: Montrell Gutierrez MD;  Location: [facility];  Service: General;  Laterality: N/A;  RN PREOP 9/9----COVID Negative  9/9    OVARIAN CYST REMOVAL  3/13/2014    MI REMOVAL OF OVARY/TUBE(S)      SPLENECTOMY, TOTAL  July 2003    TONSILLECTOMY      as a child    TYMPANOSTOMY TUBE PLACEMENT  1976    VEIN SURGERY  2003    Lt leg       FAMILY HISTORY   Family History   Problem Relation Age of Onset    Hypertension Father     Diabetes Father     Heart disease Father     Cataracts Father     Diabetes Paternal " Grandfather     Heart disease Paternal Grandfather     No Known Problems Mother     Ovarian cancer Maternal Grandmother          from this. ? age     No Known Problems Sister     No Known Problems Brother     No Known Problems Maternal Aunt     No Known Problems Maternal Uncle     No Known Problems Paternal Aunt     No Known Problems Paternal Uncle     No Known Problems Maternal Grandfather     Ovarian cancer Paternal Grandmother     Uterine cancer Neg Hx     Breast cancer Neg Hx     Colon cancer Neg Hx     Amblyopia Neg Hx     Blindness Neg Hx     Cancer Neg Hx     Glaucoma Neg Hx     Macular degeneration Neg Hx     Retinal detachment Neg Hx     Strabismus Neg Hx     Stroke Neg Hx     Thyroid disease Neg Hx        ALLERGIES   Review of patient's allergies indicates:   Allergen Reactions    Morphine Other (See Comments)     Patient had a psychotic episode after taking Morphine  Agitation, hallucinations    Penicillins Anaphylaxis     itching    Januvia [sitagliptin] Hives       CURRENT MEDICATION REGIMEN   Home Meds:   Prior to Admission medications    Medication Sig Start Date End Date Taking? Authorizing Provider   aspirin 81 MG Chew Take 1 tablet (81 mg total) by mouth once daily. 21 Yes Ifeoma Jacobs MD   DUPIXENT  mg/2 mL PnIj SMARTSI Milligram(s) SUB-Q Every 2 Weeks 22  Yes Historical Provider   famotidine (PEPCID) 20 MG tablet Take 20 mg by mouth 2 (two) times daily.   Yes Historical Provider   fluticasone-salmeterol diskus inhaler 250-50 mcg Inhale 1 puff into the lungs 2 (two) times daily. Controller 21 Yes Donaldo Pena MD   furosemide (LASIX) 20 MG tablet TAKE 1 TABLET(20 MG) BY MOUTH EVERY DAY 22  Yes Donaldo Pena MD   gabapentin (NEURONTIN) 300 MG capsule TAKE 2 CAPSULES(600 MG) BY MOUTH TWICE DAILY 22  Yes Donaldo Pena MD   hydrOXYzine (ATARAX) 50 MG tablet Take 50 mg by mouth 4 (four) times daily as  needed. 3/17/22  Yes Historical Provider   ibuprofen (ADVIL,MOTRIN) 600 MG tablet TAKE 1 TABLET(600 MG) BY MOUTH EVERY 8 HOURS AS NEEDED FOR PAIN OR BACK PAIN 4/11/22  Yes Donaldo Pena MD   lisinopriL 10 MG tablet Take 1 tablet (10 mg total) by mouth once daily. 4/4/22  Yes Donaldo Pena MD   loratadine (CLARITIN) 10 mg tablet Take 1 tablet (10 mg total) by mouth once daily. 12/30/21 12/30/22 Yes Lary Sweet DO   metFORMIN (GLUCOPHAGE) 1000 MG tablet TAKE 1 TABLET(1000 MG) BY MOUTH TWICE DAILY WITH MEALS 12/26/21  Yes Donaldo Pena MD   methocarbamoL (ROBAXIN) 500 MG Tab TAKE 1 TABLET(500 MG) BY MOUTH EVERY 6 HOURS AS NEEDED FOR BACK PAIN 2/9/22  Yes Donaldo Pena MD   metoprolol tartrate (LOPRESSOR) 50 MG tablet TAKE 1 TABLET(50 MG) BY MOUTH TWICE DAILY 12/26/21  Yes Donaldo Pena MD   pravastatin (PRAVACHOL) 40 MG tablet TAKE 1 TABLET(40 MG) BY MOUTH EVERY EVENING 12/26/21  Yes Donaldo Pena MD   risperiDONE (RISPERDAL) 4 MG tablet Take 4 mg by mouth nightly. 12/23/21  Yes Historical Provider   VYVANSE 40 mg Cap Take 40 mg by mouth every morning. 3/16/22  Yes Historical Provider   acetaminophen (TYLENOL) 500 MG tablet Take 2 tablets (1,000 mg total) by mouth every 6 (six) hours as needed for Pain. 7/13/21   Lary Sweet DO   albuterol (PROVENTIL/VENTOLIN HFA) 90 mcg/actuation inhaler Inhale 2 puffs into the lungs every 6 (six) hours as needed for Wheezing. Use with spacer  Dispense with 1 spacer 12/30/21 12/30/22  Lary Sweet DO   albuterol sulfate 2.5 mg/0.5 mL Nebu Take 2.5 mg by nebulization every 4 (four) hours as needed (As needed for shortness of breath). Rescue 12/30/21 12/30/22  Lary Sweet DO   albuterol-ipratropium (DUO-NEB) 2.5 mg-0.5 mg/3 mL nebulizer solution Take 3 mLs by nebulization every 6 (six) hours as needed for Wheezing or Shortness of Breath. Rescue 7/19/21 7/19/22  Donaldo Pena MD   aluminum-magnesium hydroxide-simethicone (MAALOX) 200-200-20 mg/5 mL Susp  Take 30 mLs by mouth every 6 (six) hours as needed (indigestion). 2/20/21 2/20/22  Ifeoma Jacobs MD   ammonium lactate (LAC-HYDRIN) 12 % lotion Apply topically. 2/12/22   Historical Provider   blood sugar diagnostic Strp To check BG two  times daily, to use with insurance preferred meter 9/30/21   Donaldo Pena MD   blood-glucose meter kit To check BG once daily, to use with insurance preferred meter 2/20/21 12/8/23  Ifeoma Jacobs MD   blood-glucose meter kit To check BG two times daily, to use with insurance preferred meter 9/30/21 9/30/22  Donaldo Pena MD   diclofenac sodium (VOLTAREN) 1 % Gel Apply 2 g topically 4 (four) times daily as needed (Apply to painful area up to 4 times a day as needed for pain). Apply to painful area 4 times a day as needed for pain 10/1/21 10/11/21  Corie Iqbal NP   dicyclomine (BENTYL) 20 mg tablet Take 1 tablet (20 mg total) by mouth every 6 (six) hours. 3/12/22   Tu Arredondo PA-C   divalproex ER (DEPAKOTE) 500 MG Tb24 Take 2,500 mg by mouth. 5/12/21 6/11/21  Historical Provider   EPITOL 200 mg tablet  2/26/21   Historical Provider   fluticasone propionate (FLONASE) 50 mcg/actuation nasal spray 1 spray (50 mcg total) by Each Nostril route 2 (two) times daily. 12/30/21   Lary Sweet DO   folic acid (FOLVITE) 1 MG tablet Take 1 tablet (1 mg total) by mouth once daily. 7/19/21 10/17/21  Donaldo Pena MD   haloperidoL (HALDOL) 10 MG tablet Take 10 mg by mouth 2 (two) times daily. 5/12/21   Historical Provider   lancets Misc To check BG two times daily, to use with insurance preferred meter 9/30/21   Donaldo Pena MD   metronidazole 1% (METROGEL) 1 % Gel Apply 1 application topically once daily. 1/3/22   Historical Provider   multivitamin Tab Take 1 tablet by mouth once daily. 2/20/21   Ifeoma Jacobs MD   mupirocin (BACTROBAN) 2 % ointment Apply topically 2 (two) times daily. 12/18/21   Historical Provider   nystatin (NYSTOP) powder APPLY TOPICALLY TO AXILLA  AND BREAST FOLDS TWICE DAILY 9/30/21   Donaldo Pena MD   [facility] LG MASK Spcr Inhale into the lungs. 12/30/21   Historical Provider   pantoprazole (PROTONIX) 40 MG tablet Take 1 tablet (40 mg total) by mouth once daily. 7/13/21   Aiden Oleary MD   polyvinyl alcohol, artificial tears, (LIQUIFILM TEARS) 1.4 % ophthalmic solution Place 1 drop into both eyes 4 (four) times daily. 2/20/21   Ifeoma Jacobs MD   QUEtiapine (SEROQUEL) 200 MG Tab Take 1 tablet (200 mg total) by mouth before breakfast. 2/21/21 5/27/21  Ifeoma Jacobs MD   senna (SENOKOT) 8.6 mg tablet Take 1 tablet by mouth 2 (two) times a day. 2/20/21   Ifeoma Jacobs MD   triamcinolone acetonide 0.1% (KENALOG) 0.1 % ointment Apply topically 3 (three) times daily. 12/18/21   Historical Provider   TRUE METRIX GLUCOSE METER [facility] [place]LOOD [place] TWICE DAILY 11/4/21   Donaldo Pena MD   white petrolatum 86.5 % Oint Apply 1 Squirt topically 2 (two) times a day. 2/20/21   Ifeoma Jacobs MD       Scheduled Meds:    aspirin  81 mg Oral Daily    divalproex  1,000 mg Oral QHS    divalproex  500 mg Oral Daily    enoxaparin  1 mg/kg Subcutaneous Q12H    famotidine  20 mg Oral BID    ferrous sulfate  1 tablet Oral Daily    fluticasone furoate-vilanteroL  1 puff Inhalation Daily    furosemide  20 mg Oral Daily    gabapentin  300 mg Oral BID    lisinopriL  10 mg Oral Daily    miconazole NITRATE 2 %   Topical (Top) BID    nicotine  1 patch Transdermal Daily    pravastatin  40 mg Oral QHS    risperiDONE  2 mg Oral BID    sulfamethoxazole-trimethoprim 800-160mg  2 tablet Oral BID      PRN Meds: acetaminophen, albuterol-ipratropium, dextrose 10%, dextrose 10%, glucagon (human recombinant), glucose, glucose, HYDROcodone-acetaminophen, hydrocortisone, hydrOXYzine pamoate, insulin aspart U-100, melatonin, naloxone, ondansetron, senna-docusate 8.6-50 mg, sodium chloride 0.9%   Psychotherapeutics (From admission, onward)            Start      Stop Route Frequency Ordered    04/13/22 2100  risperiDONE disintegrating tablet 2 mg         -- Oral 2 times daily 04/13/22 7128          LABORATORY DATA   Recent Results (from the past 72 hour(s))   POCT glucose    Collection Time: 04/15/22  4:15 PM   Result Value Ref Range    POCT Glucose 329 (H) 70 - 110 mg/dL   POCT glucose    Collection Time: 04/15/22  9:42 PM   Result Value Ref Range    POCT Glucose 240 (H) 70 - 110 mg/dL   POCT glucose    Collection Time: 04/16/22  4:51 AM   Result Value Ref Range    POCT Glucose 289 (H) 70 - 110 mg/dL   Valproic Acid    Collection Time: 04/16/22  5:19 AM   Result Value Ref Range    Valproic Acid Level 64.3 50.0 - 100.0 ug/mL   CBC auto differential    Collection Time: 04/16/22  5:19 AM   Result Value Ref Range    WBC 13.25 (H) 3.90 - 12.70 K/uL    RBC 4.14 4.00 - 5.40 M/uL    Hemoglobin 11.5 (L) 12.0 - 16.0 g/dL    Hematocrit 36.1 (L) 37.0 - 48.5 %    MCV 87 82 - 98 fL    MCH 27.8 27.0 - 31.0 pg    MCHC 31.9 (L) 32.0 - 36.0 g/dL    RDW 15.5 (H) 11.5 - 14.5 %    Platelets 562 (H) 150 - 450 K/uL    MPV 10.0 9.2 - 12.9 fL    Immature Granulocytes 2.1 (H) 0.0 - 0.5 %    Gran # (ANC) 6.8 1.8 - 7.7 K/uL    Immature Grans (Abs) 0.28 (H) 0.00 - 0.04 K/uL    Lymph # 4.1 1.0 - 4.8 K/uL    Mono # 1.5 (H) 0.3 - 1.0 K/uL    Eos # 0.6 (H) 0.0 - 0.5 K/uL    Baso # 0.12 0.00 - 0.20 K/uL    nRBC 0 0 /100 WBC    Gran % 51.0 38.0 - 73.0 %    Lymph % 30.9 18.0 - 48.0 %    Mono % 10.9 4.0 - 15.0 %    Eosinophil % 4.2 0.0 - 8.0 %    Basophil % 0.9 0.0 - 1.9 %    Platelet Estimate Increased (A)     Aniso Slight     Poik Slight     Poly Occasional     Hypo Occasional     Ovalocytes Occasional     Comfort Cells Occasional     Differential Method Automated    POCT glucose    Collection Time: 04/16/22 11:16 AM   Result Value Ref Range    POCT Glucose 316 (H) 70 - 110 mg/dL   POCT glucose    Collection Time: 04/16/22 11:18 AM   Result Value Ref Range    POC Glucose 316 (A) 70 - 110 MG/DL   POCT glucose     Collection Time: 04/16/22  3:41 PM   Result Value Ref Range    POCT Glucose 214 (H) 70 - 110 mg/dL   POCT glucose    Collection Time: 04/16/22  7:43 PM   Result Value Ref Range    POCT Glucose 246 (H) 70 - 110 mg/dL   POCT glucose    Collection Time: 04/17/22  6:32 AM   Result Value Ref Range    POCT Glucose 247 (H) 70 - 110 mg/dL   CBC Auto Differential    Collection Time: 04/17/22 10:50 AM   Result Value Ref Range    WBC 14.34 (H) 3.90 - 12.70 K/uL    RBC 3.75 (L) 4.00 - 5.40 M/uL    Hemoglobin 10.3 (L) 12.0 - 16.0 g/dL    Hematocrit 32.1 (L) 37.0 - 48.5 %    MCV 86 82 - 98 fL    MCH 27.5 27.0 - 31.0 pg    MCHC 32.1 32.0 - 36.0 g/dL    RDW 15.3 (H) 11.5 - 14.5 %    Platelets 621 (H) 150 - 450 K/uL    MPV 9.9 9.2 - 12.9 fL    Immature Granulocytes 3.7 (H) 0.0 - 0.5 %    Gran # (ANC) 6.8 1.8 - 7.7 K/uL    Immature Grans (Abs) 0.53 (H) 0.00 - 0.04 K/uL    Lymph # 4.8 1.0 - 4.8 K/uL    Mono # 1.5 (H) 0.3 - 1.0 K/uL    Eos # 0.6 (H) 0.0 - 0.5 K/uL    Baso # 0.11 0.00 - 0.20 K/uL    nRBC 0 0 /100 WBC    Gran % 47.6 38.0 - 73.0 %    Lymph % 33.3 18.0 - 48.0 %    Mono % 10.1 4.0 - 15.0 %    Eosinophil % 4.5 0.0 - 8.0 %    Basophil % 0.8 0.0 - 1.9 %    Differential Method Automated    Basic metabolic panel    Collection Time: 04/17/22 10:50 AM   Result Value Ref Range    Sodium 134 (L) 136 - 145 mmol/L    Potassium 4.9 3.5 - 5.1 mmol/L    Chloride 96 95 - 110 mmol/L    CO2 24 23 - 29 mmol/L    Glucose 215 (H) 70 - 110 mg/dL    BUN 13 6 - 20 mg/dL    Creatinine 0.7 0.5 - 1.4 mg/dL    Calcium 10.1 8.7 - 10.5 mg/dL    Anion Gap 14 8 - 16 mmol/L    eGFR if African American >60 >60 mL/min/1.73 m^2    eGFR if non African American >60 >60 mL/min/1.73 m^2   Magnesium    Collection Time: 04/17/22 10:50 AM   Result Value Ref Range    Magnesium 1.5 (L) 1.6 - 2.6 mg/dL   Phosphorus    Collection Time: 04/17/22 10:50 AM   Result Value Ref Range    Phosphorus 4.3 2.7 - 4.5 mg/dL   POCT glucose    Collection Time: 04/17/22 12:10 PM  "  Result Value Ref Range    POCT Glucose 229 (H) 70 - 110 mg/dL   POCT glucose    Collection Time: 04/17/22  5:06 PM   Result Value Ref Range    POCT Glucose 165 (H) 70 - 110 mg/dL   POCT glucose    Collection Time: 04/17/22  7:04 PM   Result Value Ref Range    POCT Glucose 213 (H) 70 - 110 mg/dL   POCT glucose    Collection Time: 04/18/22  4:10 AM   Result Value Ref Range    POCT Glucose 234 (H) 70 - 110 mg/dL   POCT glucose    Collection Time: 04/18/22 11:13 AM   Result Value Ref Range    POCT Glucose 245 (H) 70 - 110 mg/dL      Lab Results   Component Value Date    VALPROATE 64.3 04/16/2022    CBMZ 3.6 (L) 01/26/2021         EXAMINATION    VITALS   Vitals:    04/18/22 1010 04/18/22 1116 04/18/22 1151 04/18/22 1200   BP:  134/68     BP Location:  Right arm     Patient Position:  Sitting     Pulse:  (!) 116 110    Resp: 18 18     Temp:  98.4 °F (36.9 °C)     TempSrc:  Oral     SpO2:  99%  97%   Weight:       Height:            CONSTITUTIONAL  General Appearance: NAD, unremarkable, age appropriate, pacing around room, overweight     MUSCULOSKELETAL  Muscle Strength and Tone: WNL  Abnormal Involuntary Movements: none observed   Gait and Station: WNL; non-ataxic      PSYCHIATRIC   Behavior/Cooperation:  cooperative, restless and fidgety , eye contact intermittent   Speech:  normal tone, normal pitch, normal volume, rapid/pressured   Language: grossly intact, able to name, able to repeat with spontaneous speech  Mood: "OK"  Affect:  Labile ; Tearful ; Not Congruent with Endorsed Mood   Associations: intermittent TERRI  Thought Process: Linear with intermittent tangential / TERRI ; mild disorganization   Thought Content: + paranoia ; denies SI, HI, AH, VH, TH, delusions (no RIS observed)  Sensorium:  Awake  Alert and Oriented: to person, place, situation, month of year, year  Memory: 3/3 immediate, 2/3 at 5 minutes               Recent: Intact; able to report recent events              Remote: Intact; Named 4/4 past " presidents   Attention/concentration: Fair.  Requiring frequent redirection. Appropriate for age/education. Able to spell w-[id number]-r-l-[id number] & d-l-r-o-w.   Similarities: Intact (difference between apple and orange?)  Abstract reasoning: Limited   Insight: Limited  Judgment: Limited     CAM ICU Delirium Assessment - NEGATIVE        Is the patient aware of the biomedical complications associated with substance abuse and mental illness? yes           MEDICAL DECISION MAKING     ASSESSMENT      Bipolar I Disorder (currently severe with mixed s/s of depression and bibi)   Unspecified Insomnia   Hx of ADHD      RECOMMENDATIONS       - Continue PEC/CEC for grave disability 2/2 mental illness at this time.      - Once medically cleared / stable, seek transfer back to [facility] (or another inpatient psychiatric facility) for continued mental health treatment / stabilization.     - Increase Risperdal to 2 mg PO QAM & 3 mg PO QHS and continue Depakote  mg PO QAM and 1000 mg PO QHS for Bipolar I Disorder and insomnia (discussed risks/benefits/alt vs no treatment with patient)     - Continue Vistaril 50 mg PO TID PRN for anxiety and/or insomnia (discussed risks/benefits/alt vs no treatment with patient)     - HOLD previously outpt prescribed Vyvanse (discussed risks/benefits/alt vs no treatment with patient)     - Psychotherapy was again performed with patient as noted above with a focus on improving mood / bibi / thought process and sleep.     - Patient's most recent labs, imaging, and EKG were reviewed again today; VPA level on 04/16/2022 was 64.3      - Continue suicide / violence precautions and continue to monitor the patient with a sitter while on a PEC / CEC.       - Thank you for this consult ; our team will continue to follow patient         Total time spent with patient and/or managing/coordinating patient's care today (excluding the time spent on psychotherapy): 41 minutes   Time spent on psychotherapy today (as  noted above): 18 minutes   Total time for encounter today including psychotherapy: 59 minutes      More than 50% of the time was spent counseling/coordinating care.     Consulting clinician was informed of the encounter and consult note.      STAFF:  Magdiel Del Real MD  [facility]  4/18/2022

## 2022-04-18 NOTE — PLAN OF CARE
Discharge orders noted. Patient is still under CEC. TN placed ADT order for centralized psych placement. I did write in comments patient was from [facility] previously. However, patient will be placed wherever they can accommodate her. Follow-up appointments previously scheduled.     Original PEC/CEC already placed in discharge packet. Copy of PEC/CEC in blue folder.    1302--TN spoke with Drew at the transfer center. He stated would need a rapid covid test. Order placed.    1400--TN called and spoke with patient. She is aware of discharge to [facility] Psych today. I told her she is on CEC hold, she would be placed in facility that could accommodate her. She also gave TN permission to call her sister to update. TN called and updated sister Anitra. No further CM needs.    1641--TN reviewed PFC, no accepting facility at this time.  04/18 1741     Ownership Claimed by Raven Bentley MA              [phone]     Call to [facility] Email  [No callback number listed]     Entered By: Venu Schuler   Packet sent to all facilities. Awaiting acceptance.            Patient Contacts    Name Relation Home Work Mobile   Anitra Cain Sister [phone]     Tabitha Man Other [phone]     Catia Weathers Mother   [phone]   Ana Lopez Daughter   [phone]     Future Appointments   Date Time Provider Department Center   4/28/2022  2:00 PM Donaldo Pena MD [facility] FM IM [facility] - B   4/28/2022  2:30 PM Carley Palomo [facility] SMOKE [place]   5/9/2022  2:00 PM Saloni De Anda MD [facility] Cli   5/26/2022  3:00 PM Maira De Los Santos DPM [facility] POD [place]         04/18/22 1254   Final Note   Assessment Type Final Discharge Note   Anticipated Discharge Disposition Psych   Hospital Resources/Appts/Education Provided Appointments scheduled by Navigator/Coordinator   Post-Acute Status   Post-Acute Authorization Placement  (IP Psych Placement)   Post-Acute Placement Status Referrals Sent   Discharge Delays  (!) Other  (IP Psych Placement)     Sammi Root RN    [phone]

## 2022-04-19 LAB
ACANTHOCYTES BLD QL SMEAR: PRESENT
ALBUMIN SERPL BCP-MCNC: 3.4 G/DL (ref 3.5–5.2)
ALP SERPL-CCNC: 87 U/L (ref 55–135)
ALT SERPL W/O P-5'-P-CCNC: 25 U/L (ref 10–44)
ANION GAP SERPL CALC-SCNC: 14 MMOL/L (ref 8–16)
ANISOCYTOSIS BLD QL SMEAR: SLIGHT
AST SERPL-CCNC: 14 U/L (ref 10–40)
BASOPHILS NFR BLD: 2 % (ref 0–1.9)
BILIRUB SERPL-MCNC: 0.2 MG/DL (ref 0.1–1)
BUN SERPL-MCNC: 29 MG/DL (ref 6–20)
CALCIUM SERPL-MCNC: 10.5 MG/DL (ref 8.7–10.5)
CHLORIDE SERPL-SCNC: 97 MMOL/L (ref 95–110)
CO2 SERPL-SCNC: 23 MMOL/L (ref 23–29)
CREAT SERPL-MCNC: 1 MG/DL (ref 0.5–1.4)
DIFFERENTIAL METHOD: ABNORMAL
EOSINOPHIL NFR BLD: 1 % (ref 0–8)
ERYTHROCYTE [DISTWIDTH] IN BLOOD BY AUTOMATED COUNT: 16.1 % (ref 11.5–14.5)
EST. GFR  (AFRICAN AMERICAN): >60 ML/MIN/1.73 M^2
EST. GFR  (NON AFRICAN AMERICAN): >60 ML/MIN/1.73 M^2
GLUCOSE SERPL-MCNC: 242 MG/DL (ref 70–110)
HCT VFR BLD AUTO: 35.4 % (ref 37–48.5)
HGB BLD-MCNC: 10.7 G/DL (ref 12–16)
HYPOCHROMIA BLD QL SMEAR: ABNORMAL
IMM GRANULOCYTES # BLD AUTO: ABNORMAL K/UL (ref 0–0.04)
IMM GRANULOCYTES NFR BLD AUTO: ABNORMAL % (ref 0–0.5)
INR PPP: 1 (ref 0.8–1.2)
LYMPHOCYTES NFR BLD: 15 % (ref 18–48)
MCH RBC QN AUTO: 27.4 PG (ref 27–31)
MCHC RBC AUTO-ENTMCNC: 30.2 G/DL (ref 32–36)
MCV RBC AUTO: 91 FL (ref 82–98)
MONOCYTES NFR BLD: 1 % (ref 4–15)
MYELOCYTES NFR BLD MANUAL: 2 %
NEUTROPHILS NFR BLD: 78 % (ref 38–73)
NEUTS BAND NFR BLD MANUAL: 1 %
NRBC BLD-RTO: 0 /100 WBC
OVALOCYTES BLD QL SMEAR: ABNORMAL
PLATELET # BLD AUTO: 536 K/UL (ref 150–450)
PLATELET BLD QL SMEAR: ABNORMAL
PMV BLD AUTO: 10.5 FL (ref 9.2–12.9)
POCT GLUCOSE: 170 MG/DL (ref 70–110)
POCT GLUCOSE: 246 MG/DL (ref 70–110)
POCT GLUCOSE: 249 MG/DL (ref 70–110)
POCT GLUCOSE: 266 MG/DL (ref 70–110)
POIKILOCYTOSIS BLD QL SMEAR: SLIGHT
POTASSIUM SERPL-SCNC: 4.6 MMOL/L (ref 3.5–5.1)
POTASSIUM SERPL-SCNC: 5.5 MMOL/L (ref 3.5–5.1)
PROT SERPL-MCNC: 7.9 G/DL (ref 6–8.4)
PROTHROMBIN TIME: 10.3 SEC (ref 9–12.5)
RBC # BLD AUTO: 3.91 M/UL (ref 4–5.4)
SODIUM SERPL-SCNC: 134 MMOL/L (ref 136–145)
WBC # BLD AUTO: 18.05 K/UL (ref 3.9–12.7)

## 2022-04-19 PROCEDURE — 94761 N-INVAS EAR/PLS OXIMETRY MLT: CPT

## 2022-04-19 PROCEDURE — 84132 ASSAY OF SERUM POTASSIUM: CPT | Performed by: HOSPITALIST

## 2022-04-19 PROCEDURE — 85007 BL SMEAR W/DIFF WBC COUNT: CPT | Performed by: HOSPITALIST

## 2022-04-19 PROCEDURE — 90833 PR PSYCHOTHERAPY W/PATIENT W/E&M, 30 MIN (ADD ON): ICD-10-PCS | Mod: AF,HB,, | Performed by: PSYCHIATRY & NEUROLOGY

## 2022-04-19 PROCEDURE — 36415 COLL VENOUS BLD VENIPUNCTURE: CPT | Performed by: HOSPITALIST

## 2022-04-19 PROCEDURE — 85027 COMPLETE CBC AUTOMATED: CPT | Performed by: HOSPITALIST

## 2022-04-19 PROCEDURE — 90833 PSYTX W PT W E/M 30 MIN: CPT | Mod: AF,HB,, | Performed by: PSYCHIATRY & NEUROLOGY

## 2022-04-19 PROCEDURE — 25000003 PHARM REV CODE 250: Performed by: PHYSICIAN ASSISTANT

## 2022-04-19 PROCEDURE — 25000003 PHARM REV CODE 250: Performed by: PSYCHIATRY & NEUROLOGY

## 2022-04-19 PROCEDURE — 80053 COMPREHEN METABOLIC PANEL: CPT | Performed by: HOSPITALIST

## 2022-04-19 PROCEDURE — 85610 PROTHROMBIN TIME: CPT | Performed by: HOSPITALIST

## 2022-04-19 PROCEDURE — 99233 SBSQ HOSP IP/OBS HIGH 50: CPT | Mod: AF,HB,, | Performed by: PSYCHIATRY & NEUROLOGY

## 2022-04-19 PROCEDURE — 94640 AIRWAY INHALATION TREATMENT: CPT

## 2022-04-19 PROCEDURE — 25000003 PHARM REV CODE 250: Performed by: NURSE PRACTITIONER

## 2022-04-19 PROCEDURE — 27000207 HC ISOLATION

## 2022-04-19 PROCEDURE — 99233 PR SUBSEQUENT HOSPITAL CARE,LEVL III: ICD-10-PCS | Mod: AF,HB,, | Performed by: PSYCHIATRY & NEUROLOGY

## 2022-04-19 PROCEDURE — 25000003 PHARM REV CODE 250: Performed by: HOSPITALIST

## 2022-04-19 PROCEDURE — 99900035 HC TECH TIME PER 15 MIN (STAT)

## 2022-04-19 PROCEDURE — 11000001 HC ACUTE MED/SURG PRIVATE ROOM

## 2022-04-19 RX ORDER — RISPERIDONE 1 MG/1
2 TABLET, ORALLY DISINTEGRATING ORAL DAILY
Status: DISCONTINUED | OUTPATIENT
Start: 2022-04-20 | End: 2022-04-25

## 2022-04-19 RX ORDER — DIVALPROEX SODIUM 250 MG/1
1250 TABLET, DELAYED RELEASE ORAL NIGHTLY
Status: DISCONTINUED | OUTPATIENT
Start: 2022-04-19 | End: 2022-04-26 | Stop reason: HOSPADM

## 2022-04-19 RX ORDER — RISPERIDONE 1 MG/1
3 TABLET, ORALLY DISINTEGRATING ORAL NIGHTLY
Status: DISCONTINUED | OUTPATIENT
Start: 2022-04-19 | End: 2022-04-26 | Stop reason: HOSPADM

## 2022-04-19 RX ADMIN — ACETAMINOPHEN 650 MG: 325 TABLET ORAL at 05:04

## 2022-04-19 RX ADMIN — GABAPENTIN 300 MG: 300 CAPSULE ORAL at 09:04

## 2022-04-19 RX ADMIN — GABAPENTIN 300 MG: 300 CAPSULE ORAL at 08:04

## 2022-04-19 RX ADMIN — INSULIN ASPART 2 UNITS: 100 INJECTION, SOLUTION INTRAVENOUS; SUBCUTANEOUS at 05:04

## 2022-04-19 RX ADMIN — MICONAZOLE NITRATE 2 % TOPICAL POWDER: at 09:04

## 2022-04-19 RX ADMIN — INSULIN ASPART 4 UNITS: 100 INJECTION, SOLUTION INTRAVENOUS; SUBCUTANEOUS at 12:04

## 2022-04-19 RX ADMIN — LISINOPRIL 10 MG: 10 TABLET ORAL at 09:04

## 2022-04-19 RX ADMIN — MICONAZOLE NITRATE 2 % TOPICAL POWDER: at 08:04

## 2022-04-19 RX ADMIN — RISPERIDONE 3 MG: 1 TABLET, ORALLY DISINTEGRATING ORAL at 08:04

## 2022-04-19 RX ADMIN — METRONIDAZOLE 500 MG: 500 TABLET ORAL at 05:04

## 2022-04-19 RX ADMIN — HYDROCODONE BITARTRATE AND ACETAMINOPHEN 1 TABLET: 10; 325 TABLET ORAL at 11:04

## 2022-04-19 RX ADMIN — METRONIDAZOLE 500 MG: 500 TABLET ORAL at 02:04

## 2022-04-19 RX ADMIN — RISPERIDONE 2 MG: 1 TABLET, ORALLY DISINTEGRATING ORAL at 08:04

## 2022-04-19 RX ADMIN — FAMOTIDINE 20 MG: 20 TABLET ORAL at 08:04

## 2022-04-19 RX ADMIN — FUROSEMIDE 20 MG: 20 TABLET ORAL at 09:04

## 2022-04-19 RX ADMIN — PRAVASTATIN SODIUM 40 MG: 40 TABLET ORAL at 08:04

## 2022-04-19 RX ADMIN — Medication 6 MG: at 08:04

## 2022-04-19 RX ADMIN — APIXABAN 10 MG: 5 TABLET, FILM COATED ORAL at 08:04

## 2022-04-19 RX ADMIN — FAMOTIDINE 20 MG: 20 TABLET ORAL at 09:04

## 2022-04-19 RX ADMIN — METRONIDAZOLE 500 MG: 500 TABLET ORAL at 08:04

## 2022-04-19 RX ADMIN — APIXABAN 10 MG: 5 TABLET, FILM COATED ORAL at 09:04

## 2022-04-19 RX ADMIN — HYDROXYZINE PAMOATE 50 MG: 25 CAPSULE ORAL at 08:04

## 2022-04-19 RX ADMIN — SULFAMETHOXAZOLE AND TRIMETHOPRIM 2 TABLET: 800; 160 TABLET ORAL at 08:04

## 2022-04-19 RX ADMIN — ASPIRIN 81 MG CHEWABLE TABLET 81 MG: 81 TABLET CHEWABLE at 08:04

## 2022-04-19 RX ADMIN — DIVALPROEX SODIUM 500 MG: 250 TABLET, DELAYED RELEASE ORAL at 09:04

## 2022-04-19 RX ADMIN — SULFAMETHOXAZOLE AND TRIMETHOPRIM 2 TABLET: 800; 160 TABLET ORAL at 09:04

## 2022-04-19 RX ADMIN — FLUTICASONE FUROATE AND VILANTEROL TRIFENATATE 1 PUFF: 100; 25 POWDER RESPIRATORY (INHALATION) at 07:04

## 2022-04-19 NOTE — HPI
50 y/o with past medical history of ADHD (attention deficit hyperactivity disorder), Arthritis, Asthma, Bipolar 1 disorder, Cataract, COPD (chronic obstructive pulmonary disease), Coronary artery disease, Depression, Diabetes mellitus, DVT of lower extremity, bilateral, IVC filter in place; Encounter for blood transfusion, History of blood clots, HTN (hypertension), Hypercholesteremia, Irregular heartbeat, Neuromuscular disorder, PE (pulmonary embolism), and Restless leg syndrome. Allergic to PCN - anaphylaxis and itching;     Patient presented to [facility] from [facility] (currently under CEC) for evaluation of myalgias, bilateral lower extremity swelling, bilateral foot pain, wounds to both feet, subjective fever, intermittent frontal headache. Patient was admitted to [facility] 4/12/22 and was diagnosed with cellulitis of both LE and bilateral foot lesions. Patient was given vanco in ED and than changed to clindamycin. Acute DVT of both LEs diagnosed 4/13 by US. L foot MRI on 4/14 positive for charcot foot, cellulitis and myositis. Podiatry saw patient 4/13/22 - right foot ulceration plantar aspect right distal forefoot and hallux - no signs of infection; left foot with charcot deformity, LLE with erythema and edema and ulcer to plantar left mid foot - was debrided - and extended to superficial fat layer and did not probe to bone.  BC 4/13 negative final. Left foot wound culture 4/13 positive for MRSA and finegoldia magna. Patient was given IV Vancomycin X 1 in ED on 4/12; then changed to clindamycin from 4/13 - 4/17 and then changed to bactrim and metronidazole on 4/18.     ID Consult 4/19 for help with antibiotics. Patient had WBC elevated to 31 on admit - it went down to 12 on 4/14 then increased to 18 on 4/18. Podiatry reassessing patient today. Patient seen and examined; she described serious reaction to penicillin in the past as listed on her chart. She had injured her left foot prior to admission.  She describes abdominal ecchymoses from anticoagulation injections.   4/19 ct abdomen and pelvis - Iliofemoral lymphadenopathy bilaterally could be reactive or neoplastic in nature and clinical follow-up is suggested; Lower abdominal wall panniculitis/cellulitis without large hematoma or other fluid collection; Splenectomy and colonic diverticulosis   4/20 creatinine increased to 2.4; bactrim stopped changed to vancomycin with close pharmacy monitoring  4/22 leukocytosis and cr improved

## 2022-04-19 NOTE — ASSESSMENT & PLAN NOTE
Hx of DVT/PE, hypercoagulable state, IVC filter in place  BLE venous US with thrombosis of bilateral posterior tibial veins    - Continue therapeutic lovenox      4/18- switching patient to eliquis today

## 2022-04-19 NOTE — SUBJECTIVE & OBJECTIVE
Interval History: patient doing ok today, pending CT ab/pelv to evaluate hematoma;   - patient's leukocytosis continues to rise; ID and podiatry consulted concerning patient foot wound and appreciate recs    - will need inpatient psych once medically stable     Review of Systems   Constitutional:  Negative for activity change and fever.   Respiratory:  Negative for cough and shortness of breath.    Gastrointestinal:  Negative for abdominal pain and nausea.   Hematological:  Bruises/bleeds easily.   Psychiatric/Behavioral:  Positive for agitation and behavioral problems.    Objective:     Vital Signs (Most Recent):  Temp: 99.3 °F (37.4 °C) (04/19/22 1624)  Pulse: 110 (04/19/22 1624)  Resp: 18 (04/19/22 1624)  BP: (!) 87/37 (04/19/22 1624)  SpO2: (!) 93 % (04/19/22 1624)   Vital Signs (24h Range):  Temp:  [97.3 °F (36.3 °C)-99.5 °F (37.5 °C)] 99.3 °F (37.4 °C)  Pulse:  [108-130] 110  Resp:  [18-22] 18  SpO2:  [93 %-99 %] 93 %  BP: ()/(37-70) 87/37     Weight: 107.8 kg (237 lb 10.5 oz)  Body mass index is 39.55 kg/m².    Intake/Output Summary (Last 24 hours) at 4/19/2022 1704  Last data filed at 4/18/2022 2244  Gross per 24 hour   Intake 320 ml   Output 250 ml   Net 70 ml        Physical Exam  Vitals and nursing note reviewed.   Constitutional:       General: She is not in acute distress.     Appearance: She is ill-appearing.   Pulmonary:      Effort: Pulmonary effort is normal.      Breath sounds: No wheezing.   Abdominal:      General: Abdomen is flat. There is no distension.      Comments: Abdominal hematoma   Musculoskeletal:         General: No swelling.      Left lower leg: No edema.   Skin:     Coloration: Skin is not jaundiced.      Findings: No rash.   Neurological:      General: No focal deficit present.      Mental Status: She is alert and oriented to person, place, and time.   Psychiatric:         Mood and Affect: Mood normal.         Behavior: Behavior normal.       Significant Labs: All pertinent  labs within the past 24 hours have been reviewed.    Significant Imaging: I have reviewed all pertinent imaging results/findings within the past 24 hours.

## 2022-04-19 NOTE — ASSESSMENT & PLAN NOTE
50 y/o with psychiatric illness - currently under CEC, DM, DVT/PE history with IVC filter in place, s/p splenectomy; PCN allergy listed as anaphylaxis on chart;  Admitted with bilateral wounds to feet - left worse than right (left foot with charcot deformity) and recurrent DVTs both legs. Patient was given vancomycin X 1 in ED then clindamycin until 3/17 then now on bactrim and flagyl starting today. The left foot culture collected 4/13 positive for MRSA and finegoldia magna. Patient's WBC on admit was 31 then decreased to 12 but now up again to 18 on 4/19.     Rec  Continue the bactrim and flagyl for now - will see if this improves the cellulitis and foot ulcerations. Please check to be sure patient is taking the pills - nurse said she is taking them so far.   Bilateral DVT may be clouding the picture with the leg erythema (hard to tell difference in cellulitis vs DVT)   Agree with Podiatry reassessing foot wounds  Recommend updating Tdap - last one was January 2014 per chart - patient described trauma to the feet as cause of the ulcerations - so recommend updating Tdap

## 2022-04-19 NOTE — SUBJECTIVE & OBJECTIVE
"Past Medical History:   Diagnosis Date    ADHD (attention deficit hyperactivity disorder)     Arthritis     Asthma     Bipolar 1 disorder     Cataract     COPD (chronic obstructive pulmonary disease)     Coronary artery disease     A fib    Depression     bipolar manic depresson    Diabetes mellitus     DVT of lower extremity, bilateral July 2013    bilateral LE DVT. Estelita filter placed.     Encounter for blood transfusion     History of blood clots 1. Left Leg=2003; 2.Bilateral Groin=Blood Clots= 5 or 6/ 2013 & 7/2013; 3. LLL of Lung=7/2013;  4. Lt. Lower Leg=7/2013.     Pt. had 1st Blood Clot after Rjwtjnzgxapx=8631, & Last=2013. Estelita Filter= Rt.Lateral Neck.    HTN (hypertension) 6/6/2013    Pt states that she does not have hypertension    Hypercholesteremia     Irregular heartbeat     Neuromuscular disorder     neuropathy feet    PE (pulmonary embolism) July 2013     bilat LE DVT.     Restless leg syndrome        Past Surgical History:   Procedure Laterality Date    ABDOMINAL SURGERY  2010    gastric sleeve    BILATERAL OOPHORECTOMY Bilateral 1/12/2015    CHOLECYSTECTOMY      Green' s filter Right 7/4/2012    Right Neck & Tunneled Down.    HERNIA REPAIR      "[place] [facility] Hernias [employer] around th [place].", pt. states    LAPAROSCOPIC CHOLECYSTECTOMY N/A 9/10/2020    Procedure: CHOLECYSTECTOMY, LAPAROSCOPIC;  Surgeon: Montrell Gutierrez MD;  [facility];  Service: General;  Laterality: N/A;  RN PREOP 9/9----COVID Negative  9/9    OVARIAN CYST REMOVAL  3/13/2014    RI REMOVAL OF OVARY/TUBE(S)      SPLENECTOMY, TOTAL  July 2003    TONSILLECTOMY      as a child    TYMPANOSTOMY TUBE PLACEMENT  1976    VEIN SURGERY  2003    Lt leg       Review of patient's allergies indicates:   Allergen Reactions    Morphine Other (See Comments)     Patient had a psychotic episode after taking Morphine  Agitation, hallucinations    Penicillins Anaphylaxis     itching    Januvia [sitagliptin] Hives "       Medications:  Facility-Administered Medications Prior to Admission   Medication    LIDOcaine HCL 10 mg/ml (1%) injection 1 mL    LIDOcaine-EPINEPHrine 1%-1:100,000 30 mL, LIDOcaine HCL 10 mg/ml (1%) 20 mL, sodium bicarbonate 10 mL in sodium chloride 0.9% 500 mL solution     Medications Prior to Admission   Medication Sig    aspirin 81 MG Chew Take 1 tablet (81 mg total) by mouth once daily.    DUPIXENT  mg/2 mL PnIj SMARTSI Milligram(s) SUB-Q Every 2 Weeks    famotidine (PEPCID) 20 MG tablet Take 20 mg by mouth 2 (two) times daily.    fluticasone-salmeterol diskus inhaler 250-50 mcg Inhale 1 puff into the lungs 2 (two) times daily. Controller    furosemide (LASIX) 20 MG tablet TAKE 1 TABLET(20 MG) BY MOUTH EVERY DAY    gabapentin (NEURONTIN) 300 MG capsule TAKE 2 CAPSULES(600 MG) BY MOUTH TWICE DAILY    hydrOXYzine (ATARAX) 50 MG tablet Take 50 mg by mouth 4 (four) times daily as needed.    ibuprofen (ADVIL,MOTRIN) 600 MG tablet TAKE 1 TABLET(600 MG) BY MOUTH EVERY 8 HOURS AS NEEDED FOR PAIN OR BACK PAIN    lisinopriL 10 MG tablet Take 1 tablet (10 mg total) by mouth once daily.    loratadine (CLARITIN) 10 mg tablet Take 1 tablet (10 mg total) by mouth once daily.    metFORMIN (GLUCOPHAGE) 1000 MG tablet TAKE 1 TABLET(1000 MG) BY MOUTH TWICE DAILY WITH MEALS    metoprolol tartrate (LOPRESSOR) 50 MG tablet TAKE 1 TABLET(50 MG) BY MOUTH TWICE DAILY    pravastatin (PRAVACHOL) 40 MG tablet TAKE 1 TABLET(40 MG) BY MOUTH EVERY EVENING    [DISCONTINUED] methocarbamoL (ROBAXIN) 500 MG Tab TAKE 1 TABLET(500 MG) BY MOUTH EVERY 6 HOURS AS NEEDED FOR BACK PAIN    [DISCONTINUED] risperiDONE (RISPERDAL) 4 MG tablet Take 4 mg by mouth nightly.    [DISCONTINUED] VYVANSE 40 mg Cap Take 40 mg by mouth every morning.    acetaminophen (TYLENOL) 500 MG tablet Take 2 tablets (1,000 mg total) by mouth every 6 (six) hours as needed for Pain.    albuterol (PROVENTIL/VENTOLIN HFA) 90 mcg/actuation inhaler Inhale 2 puffs  into the lungs every 6 (six) hours as needed for Wheezing. Use with spacer  Dispense with 1 spacer    albuterol-ipratropium (DUO-NEB) 2.5 mg-0.5 mg/3 mL nebulizer solution Take 3 mLs by nebulization every 6 (six) hours as needed for Wheezing or Shortness of Breath. Rescue    aluminum-magnesium hydroxide-simethicone (MAALOX) 200-200-20 mg/5 mL Susp Take 30 mLs by mouth every 6 (six) hours as needed (indigestion).    blood sugar diagnostic Strp To check BG two  times daily, to use with insurance preferred meter    blood-glucose meter kit To check BG once daily, to use with insurance preferred meter    blood-glucose meter kit To check BG two times daily, to use with insurance preferred meter    dicyclomine (BENTYL) 20 mg tablet Take 1 tablet (20 mg total) by mouth every 6 (six) hours.    EPITOL 200 mg tablet     fluticasone propionate (FLONASE) 50 mcg/actuation nasal spray 1 spray (50 mcg total) by Each Nostril route 2 (two) times daily.    folic acid (FOLVITE) 1 MG tablet Take 1 tablet (1 mg total) by mouth once daily.    lancets Misc To check BG two times daily, to use with insurance preferred meter    multivitamin Tab Take 1 tablet by mouth once daily.    OPTICHAMBER BIGG LG MASK Spcr Inhale into the lungs.    pantoprazole (PROTONIX) 40 MG tablet Take 1 tablet (40 mg total) by mouth once daily.    polyvinyl alcohol, artificial tears, (LIQUIFILM TEARS) 1.4 % ophthalmic solution Place 1 drop into both eyes 4 (four) times daily.    senna (SENOKOT) 8.6 mg tablet Take 1 tablet by mouth 2 (two) times a day.    TRUE METRIX GLUCOSE METER Misc CHECK BLOOD SUGAR TWICE DAILY    [DISCONTINUED] albuterol sulfate 2.5 mg/0.5 mL Nebu Take 2.5 mg by nebulization every 4 (four) hours as needed (As needed for shortness of breath). Rescue    [DISCONTINUED] ammonium lactate (LAC-HYDRIN) 12 % lotion Apply topically.    [DISCONTINUED] diclofenac sodium (VOLTAREN) 1 % Gel Apply 2 g topically 4 (four) times daily as needed (Apply to  painful area up to 4 times a day as needed for pain). Apply to painful area 4 times a day as needed for pain    [DISCONTINUED] divalproex ER (DEPAKOTE) 500 MG Tb24 Take 2,500 mg by mouth.    [DISCONTINUED] haloperidoL (HALDOL) 10 MG tablet Take 10 mg by mouth 2 (two) times daily.    [DISCONTINUED] metronidazole 1% (METROGEL) 1 % Gel Apply 1 application topically once daily.    [DISCONTINUED] mupirocin (BACTROBAN) 2 % ointment Apply topically 2 (two) times daily.    [DISCONTINUED] nystatin (NYSTOP) powder APPLY TOPICALLY TO AXILLA AND BREAST FOLDS TWICE DAILY    [DISCONTINUED] QUEtiapine (SEROQUEL) 200 MG Tab Take 1 tablet (200 mg total) by mouth before breakfast.    [DISCONTINUED] triamcinolone acetonide 0.1% (KENALOG) 0.1 % ointment Apply topically 3 (three) times daily.    [DISCONTINUED] white petrolatum 86.5 % Oint Apply 1 Squirt topically 2 (two) times a day.     Antibiotics (From admission, onward)                Start     Stop Route Frequency Ordered    04/18/22 1930  metroNIDAZOLE tablet 500 mg         -- Oral Every 8 hours 04/18/22 1929    04/18/22 1215  sulfamethoxazole-trimethoprim 800-160mg per tablet 2 tablet         04/28 0859 Oral 2 times daily 04/18/22 1206          Antifungals (From admission, onward)                Start     Stop Route Frequency Ordered    04/14/22 1300  miconazole NITRATE 2 % top powder         -- Top 2 times daily 04/14/22 1155          Antivirals (From admission, onward)      None             Immunization History   Administered Date(s) Administered    Hepatitis B, Adult 01/09/2014, 02/13/2014    Influenza 01/14/2015    Influenza - Quadrivalent 09/18/2015    Influenza - Quadrivalent - PF *Preferred* (6 months and older) 11/18/2016, 10/24/2017, 10/31/2018, 09/04/2019, 09/29/2020, 02/03/2022    Influenza - Trivalent - PF (ADULT) 01/14/2015    Pneumococcal Polysaccharide - 23 Valent 10/24/2017    Tdap 01/09/2014       Family History       Problem Relation (Age of Onset)    Cataracts  Father    Diabetes Father, Paternal Grandfather    Heart disease Father, Paternal Grandfather    Hypertension Father    No Known Problems Mother, Sister, Brother, Maternal Aunt, Maternal Uncle, Paternal Aunt, Paternal Uncle, Maternal Grandfather    Ovarian cancer Maternal Grandmother, Paternal Grandmother          Social History     Socioeconomic History    Marital status: Significant Other   Tobacco Use    Smoking status: Current Every Day Smoker     Packs/day: 1.00     Years: 37.00     Pack years: 37.00     Types: Cigarettes     Last attempt to quit: 2020     Years since quittin.3    Smokeless tobacco: Never Used    Tobacco comment: Enrolled in the [employer] on 5/3/14 ([facility] Member ID # [id number]). Ambulatory referral to Smoking Cessation Program   Substance and Sexual Activity    Alcohol use: No     Alcohol/week: 0.0 standard drinks    Drug use: No    Sexual activity: Yes     Partners: Male   Social History Narrative     from her  of 20 years    Lives by herself since      Review of Systems   Gastrointestinal:  Positive for diarrhea.        Diarrhea yesterday that has resolved; lower abdominal pain at site of ecchymoses   Musculoskeletal:         Bilateral leg erythema and swelling; bilateral foot wounds   Objective:     Vital Signs (Most Recent):  Temp: 97.3 °F (36.3 °C) (22 1123)  Pulse: 109 (22 1123)  Resp: 18 (22 1124)  BP: (!) 119/55 (22 1123)  SpO2: 98 % (22 1148)   Vital Signs (24h Range):  Temp:  [97.3 °F (36.3 °C)-99.5 °F (37.5 °C)] 97.3 °F (36.3 °C)  Pulse:  [108-130] 109  Resp:  [18-22] 18  SpO2:  [95 %-99 %] 98 %  BP: (118-146)/(55-70) 119/55     Weight: 107.8 kg (237 lb 10.5 oz)  Body mass index is 39.55 kg/m².    Estimated Creatinine Clearance: 83 mL/min (based on SCr of 1 mg/dL).    Physical Exam  Cardiovascular:      Heart sounds: Normal heart sounds.   Pulmonary:      Breath sounds: Normal breath sounds.   Abdominal:      General:  Bowel sounds are normal. There is no distension.      Tenderness: There is abdominal tenderness.      Comments: Positive tenderness to palpation of lower abdominal ecchymoses at injection sites with subcutaneous nodules   Musculoskeletal:      Right lower leg: Edema present.      Left lower leg: Edema present.      Comments: Bilateral edema and erythema to mid lower leg - both feet bandaged    Psychiatric:      Comments: Tangential in her speech, slightly agitated       Significant Labs: Blood Culture:   Recent Labs   Lab 04/13/22  0309 04/18/22  1946   LABBLOO No growth after 5 days.  No growth after 5 days. No Growth to date  No Growth to date     CBC:   Recent Labs   Lab 04/18/22  1246 04/19/22  0512   WBC 16.31* 18.05*   HGB 10.5* 10.7*   HCT 31.9* 35.4*   * 536*     CMP:   Recent Labs   Lab 04/18/22  1246 04/19/22  0512    134*   K 4.4 5.5*   CL 97 97   CO2 27 23   * 242*   BUN 19 29*   CREATININE 0.8 1.0   CALCIUM 10.5 10.5   PROT  --  7.9   ALBUMIN  --  3.4*   BILITOT  --  0.2   ALKPHOS  --  87   AST  --  14   ALT  --  25   ANIONGAP 14 14   EGFRNONAA >60 >60     Urine Studies:   Recent Labs   Lab 04/13/22  0143   COLORU Yellow   APPEARANCEUA Clear   PHUR 6.0   SPECGRAV 1.015   PROTEINUA Negative   GLUCUA 1+*   KETONESU Negative   BILIRUBINUA Negative   OCCULTUA Negative   NITRITE Negative   UROBILINOGEN Negative   LEUKOCYTESUR Negative     Wound Culture:   Recent Labs   Lab 04/13/22  1234   LABAERO METHICILLIN RESISTANT STAPHYLOCOCCUS AUREUS  Many  Skin rosenda also present  *       Significant Imaging:   CT abdomen and pelvis pending  4/13 MRI left foot without contrast - Impression:  Fragmentation and irregularity of the midfoot including the tarsal and metatarsal junctions most compatible with Charcot foot.  Edema signal within the subcutaneous soft tissues of the plantar structures and plantar muscles as described.  Correlate for cellulitis and myositis without drainable  abscess.  Reactive type edema from Charcot changes without discrete edema to suggest osteomyelitis.  Correlate with inflammatory markers and blood cultures if clinically warranted.    4/13 bilateral LE US  - Impression:  1. Visualization of a single right posterior tibial vein, an interval change from prior ultrasound of 12/16/2020 and concerning for thrombosis of 1 of the right posterior tibial veins.  2. Visualization of a single left posterior tibial vein, noting similar findings were present on prior ultrasound of 09/01/2019.  This is also suggestive of thrombosis of 1 of the left posterior tibial veins.  3. No additional DVT identified within the lower extremities bilaterally.  4. Enlarged left inguinal lymph nodes.  Clinical correlation advised.  5. Lower extremity edema, left greater than right.

## 2022-04-19 NOTE — PLAN OF CARE
TN spoke with patient's sister Anitra. She stated she has been in touch with patient's nurse. She is aware patient did not discharge yesterday. Discharge discontinued at this time. All questions answered. I told her would update once patient is medically ready to discharge.      Per psych notes from today: - Continue PEC/CEC for grave disability 2/2 mental illness at this time.  Patient's PEC/CEC set to  on 2022.  Will proceed with filing CHANCE petition paperwork with hospital  given that patient continues to appear gravely disabled due to mental illness at this time.     --TN spoke with Bedside nurse Aniya. She will place original PEC/CEC back in patient's chart (was orginally placed in transfer packet for anticipated discharge .)    Patient Contacts    Name Relation Home Work Mobile   Anitra Cain Sister [phone]     Tabitha Man [phone]     Catia Weathers Mother   [phone]   Ana Lopez Daughter   [phone]     Future Appointments   Date Time Provider Department Center   2022  2:00 PM Donaldo Pena MD [facility] IM [facility]   2022  2:30 PM Carley Palomo [facility] SMOKE [place]   2022  2:00 PM Saloni De Anda MD [facility] Cli   2022  3:00 PM Maira De Los Santos DPM [facility] POD [place]      22 1723   Discharge Reassessment   Assessment Type Discharge Planning Reassessment   Discharge Plan A Psychiatric hospital   Why the patient remains in the hospital Requires continued medical care     Sammi Root RN    [phone]

## 2022-04-19 NOTE — PROGRESS NOTES
[facility] Medicine  Telemedicine Progress Note    Patient Name: Audrey Natarajan  MRN: [id number]  Patient Class: OP- Observation   Admission Date: 4/12/2022  Length of Stay: 0 days  Attending Physician: Diego Ridley MD  Primary Care Provider: Donaldo Pena MD          Subjective:     Principal Problem:Diabetic foot ulcer associated with type 2 diabetes mellitus        HPI:  Audrey Natarajan is a 50 yo female with a pmh of DM2, bipolar 1 disorder, cellulitis, COPD, HTN, CAD, DVT/PE. She presented to the ED with c/o fevers x 1 day. She also has BLE swelling and pain and wounds to both feet, worsening x 2 weeks. She had fever up to 102F yesterday. She reports the wounds to her right foot are from dragging her feet while she was angry. Denies drainage to foot wounds. She has had blood clots in the past and has an IVC filter placed in 2012. She was sent here from [facility] where she was under CEC for psychosis. ED workup revealed BLE DVT on venous US, WBC 31, and Hgb 9.9. She received a dose of vancomycin while in the ED.       Overview/Hospital Course:  Admitted for diabetic foot infection, cellulitis, and DVT.  Pt with h/o DVT/PE, hypercoagulable state, IVC filter in place. Therapeutic lovenox initiated.  Podiatry consulted for diabetic foot ulcer, debrided on 4/13 with cultures obtained - growing Staph aureus so far. On IV Clindamycin. Psych consulted for medication evaluation, pt with CEC from behavioral health facility.       Interval History: patient doing ok today, having some agitation   - currently patient is CECed and needs further inpatient therapy    - patient's WBC is rising today; cultures from foot great MRSA and a anerobic bacteria; abx switched to bactrim and flagyl started  - will have podiatry re-evaluate foot in case it needs a wash out, will make npo after midnight; will also have ID weigh in on abx regimen     - patient also has a noted large lower  abdominal hematoma, likely due to lovenox injections; getting CT ab pelv to further evaluate extent of it;     - will transfer to inpatient psych once medically stable;     Review of Systems   Constitutional:  Negative for activity change and fever.   Respiratory:  Negative for cough and shortness of breath.    Gastrointestinal:  Negative for abdominal pain and nausea.   Hematological:  Bruises/bleeds easily.   Psychiatric/Behavioral:  Positive for agitation and behavioral problems.    Objective:     Vital Signs (Most Recent):  Temp: 98.3 °F (36.8 °C) (04/18/22 1737)  Pulse: 108 (04/18/22 1737)  Resp: 18 (04/18/22 1737)  BP: (!) 123/57 (04/18/22 1737)  SpO2: 95 % (04/18/22 1737)   Vital Signs (24h Range):  Temp:  [97.1 °F (36.2 °C)-98.4 °F (36.9 °C)] 98.3 °F (36.8 °C)  Pulse:  [101-138] 108  Resp:  [18-20] 18  SpO2:  [95 %-99 %] 95 %  BP: (116-134)/(57-71) 123/57     Weight: 107.8 kg (237 lb 10.5 oz)  Body mass index is 39.55 kg/m².    Intake/Output Summary (Last 24 hours) at 4/18/2022 1932  Last data filed at 4/18/2022 0628  Gross per 24 hour   Intake --   Output 4600 ml   Net -4600 ml      Physical Exam  Vitals and nursing note reviewed.   Constitutional:       General: She is not in acute distress.     Appearance: She is ill-appearing.   Pulmonary:      Effort: Pulmonary effort is normal.      Breath sounds: No wheezing.   Abdominal:      General: Abdomen is flat. There is no distension.      Comments: Abdominal hematoma   Musculoskeletal:         General: No swelling.      Left lower leg: No edema.   Skin:     Coloration: Skin is not jaundiced.      Findings: No rash.   Neurological:      General: No focal deficit present.      Mental Status: She is alert and oriented to person, place, and time.   Psychiatric:         Mood and Affect: Mood normal.         Behavior: Behavior normal.       Significant Labs: All pertinent labs within the past 24 hours have been reviewed.    Significant Imaging: I have reviewed all  pertinent imaging results/findings within the past 24 hours.      Assessment/Plan:      * Diabetic foot ulcer associated with type 2 diabetes mellitus  B/l foot ulcers  - Podiatry consulted,  - Xray in feet bilaterally, findings consistent with Charcot joint of left foot, no acute abnormality   - MRI ordered edema noted as well as charcot changes of left foot without evidence of osteomyelitis  - E ulcer debrided with deep cultures taken.   - Site dressed with xeroform and kerlix. Nursing orders in for daily dressing changes  - She is NWB to E due to ulcer and history of charcot foot.  - Will continue to follow  - Cont clindamycin - culture growing Staph aureus--sensitive to clinda.    4/18- cultures grew out MRSA and anerobic bacteria; patient is septic today and rising WBC; switching clinda to bactrim and adding flagyl; will have podiatry re-evaluate in AM and have ID consulted for abx regimen     Sepsis due to methicillin resistant Staphylococcus aureus (MRSA)  - see principle problem      Acute deep vein thrombosis (DVT) of both lower extremities  Hx of DVT/PE, hypercoagulable state, IVC filter in place  BLE venous US with thrombosis of bilateral posterior tibial veins    - Continue therapeutic lovenox      4/18- switching patient to eliquis today    Anemia  Denies bleeding, baseline 12-13, 9.9 on admit  Iron panel: Iron 13, TIBC 456, Sat Iron 3, Transferrin and Ferritin WNL    - Iron supplement  - Continue to monitor with daily CBC    SHAWN (obstructive sleep apnea)  Does not use CPAP    Psychosis  Bipolar 1 disorder    -Continue CEC from behavioral health facility  -Consulted psych for eval and medication management  - Cont Depakote and risperidone      Cellulitis of lower extremity  WBC 31, now resolved, lactic acid negative.  Given vanc in ED, switched to clindamycin.  BCx's NGTD.  Cellulitis is improving, difficult with b/l DVTs however will continue antibiotics with significant elevation of  WBC.    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  Resumed home lisinopril.      VTE Risk Mitigation (From admission, onward)         Ordered     apixaban tablet 10 mg  2 times daily         04/18/22 1859     IP VTE HIGH RISK PATIENT  Once         04/13/22 0246     Place sequential compression device  Until discontinued         04/13/22 0246                      I have assessed these finding virtually using telemed platform and with assistance of bedside nurse                 The attending portion of this evaluation, treatment, and documentation was performed per Diego Ridley MD via Telemedicine AudioVisual using the secure [employer] software platform with 2 way audio/video. The provider was located off-site and the patient is located in the hospital. The aforementioned video software was utilized to document the relevant history and physical exam    Diego Ridley MD  Department of Hospital Medicine   [facility]

## 2022-04-19 NOTE — CONSULTS
[place] [facility], [facility]  Infectious Disease  Consult Note    Patient Name: Audrey Natarajan  MRN: [id number]  Admission Date: 4/12/2022  Hospital Length of Stay: 1 days  Attending Physician: Diego Ridley MD  Primary Care Provider: Donaldo Pena MD     Isolation Status: Contact    Patient information was obtained from patient, ER records and primary team.      Inpatient consult to Infectious Diseases  Consult performed by: Edita José MD  Consult ordered by: Diego Ridley MD  Reason for consult: DM foot ulcer        Assessment/Plan:     * Diabetic foot ulcer associated with type 2 diabetes mellitus  50 y/o with psychiatric illness - currently under CEC, DM, DVT/PE history with IVC filter in place, s/p splenectomy; PCN allergy listed as anaphylaxis on chart;  Admitted with bilateral wounds to feet - left worse than right (left foot with charcot deformity) and recurrent DVTs both legs. Patient was given vancomycin X 1 in ED then clindamycin until 3/17 then now on bactrim and flagyl starting today. The left foot culture collected 4/13 positive for MRSA and finegoldia magna. Patient's WBC on admit was 31 then decreased to 12 but now up again to 18 on 4/19.     Rec  Continue the bactrim and flagyl for now - will see if this improves the cellulitis and foot ulcerations. Please check to be sure patient is taking the pills - nurse said she is taking them so far.   Bilateral DVT may be clouding the picture with the leg erythema (hard to tell difference in cellulitis vs DVT)   Agree with Podiatry reassessing foot wounds  Recommend updating Tdap - last one was January 2014 per chart - patient described trauma to the feet as cause of the ulcerations - so recommend updating Tdap           Thank you for your consult. I will follow-up with patient. Please contact us if you have any additional questions.[phone]    Edita José MD  Infectious Disease  [place] [facility]    Subjective:     Principal Problem: Diabetic foot  ulcer associated with type 2 diabetes mellitus    HPI: 50 y/o with past medical history of ADHD (attention deficit hyperactivity disorder), Arthritis, Asthma, Bipolar 1 disorder, Cataract, COPD (chronic obstructive pulmonary disease), Coronary artery disease, Depression, Diabetes mellitus, DVT of lower extremity, bilateral, IVC filter in place; Encounter for blood transfusion, History of blood clots, HTN (hypertension), Hypercholesteremia, Irregular heartbeat, Neuromuscular disorder, PE (pulmonary embolism), and Restless leg syndrome. Allergic to PCN - anaphylaxis and itching;     [place] [facility] [place] [facility] (currently under [facility]) for evaluation of myalgias, bilateral lower extremity swelling, bilateral foot pain, wounds to both feet, subjective fever, intermittent frontal headache. Patient [place] [facility] [place] and was diagnosed with cellulitis of both LE and bilateral foot lesions. Patient was given vanco in ED and than changed to clindamycin. Acute DVT of both LEs diagnosed 4/13 by US. L foot MRI on 4/14 positive for charcot foot, cellulitis and myositis. Podiatry saw patient 4/13/22 - right foot ulceration plantar aspect right distal forefoot and hallux - no signs of infection; left foot with charcot deformity, LLE with erythema and edema and ulcer to plantar left mid foot - was debrided - and extended to superficial fat layer and did not probe to bone.  BC 4/13 negative final. Left foot wound culture 4/13 positive for MRSA and finegoldia magna. Patient was given IV Vancomycin X 1 in ED on 4/12; then changed to clindamycin from 4/13 - 4/17 and then changed to bactrim and metronidazole on 4/18.     ID Consult 4/19 for help with antibiotics. Patient had WBC elevated to 31 on admit - it went down to 12 on 4/14 then increased to 18 on 4/18. Podiatry reassessing patient today. Patient seen and examined; she described serious reaction to penicillin in the past as listed on her  "chart. She had injured her left foot prior to admission. She describes abdominal ecchymoses from anticoagulation injections.       Past Medical History:   Diagnosis Date    ADHD (attention deficit hyperactivity disorder)     Arthritis     Asthma     Bipolar 1 disorder     Cataract     COPD (chronic obstructive pulmonary disease)     Coronary artery disease     A fib    Depression     bipolar manic depresson    Diabetes mellitus     DVT of lower extremity, bilateral July 2013    bilateral LE DVT. Estelita filter placed.     Encounter for blood transfusion     History of blood clots 1. Left Leg=2003; 2.Bilateral Groin=Blood Clots= 5 or 6/ 2013 & 7/2013; 3. LLL of Lung=7/2013;  4. Lt. Lower Leg=7/2013.     Pt. had 1st Blood Clot after Eovksriloque=5891, & Last=2013. Estelita Filter= Rt.Lateral Neck.    HTN (hypertension) 6/6/2013    Pt states that she does not have hypertension    Hypercholesteremia     Irregular heartbeat     Neuromuscular disorder     neuropathy feet    PE (pulmonary embolism) July 2013     bilat LE DVT.     Restless leg syndrome        Past Surgical History:   Procedure Laterality Date    ABDOMINAL SURGERY  2010    gastric sleeve    BILATERAL OOPHORECTOMY Bilateral 1/12/2015    CHOLECYSTECTOMY      Green' s filter Right 7/4/2012    Right Neck & Tunneled Down.    HERNIA REPAIR      "[place] [employer] [employer] around th [place].", pt. states    LAPAROSCOPIC CHOLECYSTECTOMY N/A 9/10/2020    Procedure: CHOLECYSTECTOMY, LAPAROSCOPIC;  Surgeon: Montrell Gutierrez MD;  Location[facility];  Service: General;  Laterality: N/A;  RN PREOP 9/9----COVID Negative  9/9    OVARIAN CYST REMOVAL  3/13/2014    [place] REMOVAL OF OVARY/TUBE(S)      SPLENECTOMY, TOTAL  July 2003    TONSILLECTOMY      as a child    TYMPANOSTOMY TUBE PLACEMENT  1976    VEIN SURGERY  2003    Lt leg       Review of patient's allergies indicates:   Allergen Reactions    Morphine Other (See Comments)     " Patient had a psychotic episode after taking Morphine  Agitation, hallucinations    Penicillins Anaphylaxis     itching    Januvia [sitagliptin] Hives       Medications:  Facility-Administered Medications Prior to Admission   Medication    LIDOcaine HCL 10 mg/ml (1%) injection 1 mL    LIDOcaine-EPINEPHrine 1%-1:100,000 30 mL, LIDOcaine HCL 10 mg/ml (1%) 20 mL, sodium bicarbonate 10 mL in sodium chloride 0.9% 500 mL solution     Medications Prior to Admission   Medication Sig    aspirin 81 MG Chew Take 1 tablet (81 mg total) by mouth once daily.    DUPIXENT  mg/2 mL PnIj SMARTSI Milligram(s) SUB-Q Every 2 Weeks    famotidine (PEPCID) 20 MG tablet Take 20 mg by mouth 2 (two) times daily.    fluticasone-salmeterol diskus inhaler 250-50 mcg Inhale 1 puff into the lungs 2 (two) times daily. Controller    furosemide (LASIX) 20 MG tablet TAKE 1 TABLET(20 MG) BY MOUTH EVERY DAY    gabapentin (NEURONTIN) 300 MG capsule TAKE 2 CAPSULES(600 MG) BY MOUTH TWICE DAILY    hydrOXYzine (ATARAX) 50 MG tablet Take 50 mg by mouth 4 (four) times daily as needed.    ibuprofen (ADVIL,MOTRIN) 600 MG tablet TAKE 1 TABLET(600 MG) BY MOUTH EVERY 8 HOURS AS NEEDED FOR PAIN OR BACK PAIN    lisinopriL 10 MG tablet Take 1 tablet (10 mg total) by mouth once daily.    loratadine (CLARITIN) 10 mg tablet Take 1 tablet (10 mg total) by mouth once daily.    metFORMIN (GLUCOPHAGE) 1000 MG tablet TAKE 1 TABLET(1000 MG) BY MOUTH TWICE DAILY WITH MEALS    metoprolol tartrate (LOPRESSOR) 50 MG tablet TAKE 1 TABLET(50 MG) BY MOUTH TWICE DAILY    pravastatin (PRAVACHOL) 40 MG tablet TAKE 1 TABLET(40 MG) BY MOUTH EVERY EVENING    [DISCONTINUED] methocarbamoL (ROBAXIN) 500 MG Tab TAKE 1 TABLET(500 MG) BY MOUTH EVERY 6 HOURS AS NEEDED FOR BACK PAIN    [DISCONTINUED] risperiDONE (RISPERDAL) 4 MG tablet Take 4 mg by mouth nightly.    [DISCONTINUED] VYVANSE 40 mg Cap Take 40 mg by mouth every morning.    acetaminophen (TYLENOL) 500  MG tablet Take 2 tablets (1,000 mg total) by mouth every 6 (six) hours as needed for Pain.    albuterol (PROVENTIL/VENTOLIN HFA) 90 mcg/actuation inhaler Inhale 2 puffs into the lungs every 6 (six) hours as needed for Wheezing. Use with spacer  Dispense with 1 spacer    albuterol-ipratropium (DUO-NEB) 2.5 mg-0.5 mg/3 mL nebulizer solution Take 3 mLs by nebulization every 6 (six) hours as needed for Wheezing or Shortness of Breath. Rescue    aluminum-magnesium hydroxide-simethicone (MAALOX) 200-200-20 mg/5 mL Susp Take 30 mLs by mouth every 6 (six) hours as needed (indigestion).    blood sugar diagnostic Strp To check BG two  times daily, to use with insurance preferred meter    blood-glucose meter kit To check BG once daily, to use with insurance preferred meter    blood-glucose meter kit To check BG two times daily, to use with insurance preferred meter    dicyclomine (BENTYL) 20 mg tablet Take 1 tablet (20 mg total) by mouth every 6 (six) hours.    EPITOL 200 mg tablet     fluticasone propionate (FLONASE) 50 mcg/actuation nasal spray 1 spray (50 mcg total) by Each Nostril route 2 (two) times daily.    folic acid (FOLVITE) 1 MG tablet Take 1 tablet (1 mg total) by mouth once daily.    lancets Misc To check BG two times daily, to use with insurance preferred meter    multivitamin Tab Take 1 tablet by mouth once daily.    OPTICHAMBER BIGG LG MASK Spcr Inhale into the lungs.    pantoprazole (PROTONIX) 40 MG tablet Take 1 tablet (40 mg total) by mouth once daily.    polyvinyl alcohol, artificial tears, (LIQUIFILM TEARS) 1.4 % ophthalmic solution Place 1 drop into both eyes 4 (four) times daily.    senna (SENOKOT) 8.6 mg tablet Take 1 tablet by mouth 2 (two) times a day.    TRUE METRIX GLUCOSE METER Misc CHECK BLOOD SUGAR TWICE DAILY    [DISCONTINUED] albuterol sulfate 2.5 mg/0.5 mL Nebu Take 2.5 mg by nebulization every 4 (four) hours as needed (As needed for shortness of breath). Rescue     [DISCONTINUED] ammonium lactate (LAC-HYDRIN) 12 % lotion Apply topically.    [DISCONTINUED] diclofenac sodium (VOLTAREN) 1 % Gel Apply 2 g topically 4 (four) times daily as needed (Apply to painful area up to 4 times a day as needed for pain). Apply to painful area 4 times a day as needed for pain    [DISCONTINUED] divalproex ER (DEPAKOTE) 500 MG Tb24 Take 2,500 mg by mouth.    [DISCONTINUED] haloperidoL (HALDOL) 10 MG tablet Take 10 mg by mouth 2 (two) times daily.    [DISCONTINUED] metronidazole 1% (METROGEL) 1 % Gel Apply 1 application topically once daily.    [DISCONTINUED] mupirocin (BACTROBAN) 2 % ointment Apply topically 2 (two) times daily.    [DISCONTINUED] nystatin (NYSTOP) powder APPLY TOPICALLY TO AXILLA AND BREAST FOLDS TWICE DAILY    [DISCONTINUED] QUEtiapine (SEROQUEL) 200 MG Tab Take 1 tablet (200 mg total) by mouth before breakfast.    [DISCONTINUED] triamcinolone acetonide 0.1% (KENALOG) 0.1 % ointment Apply topically 3 (three) times daily.    [DISCONTINUED] white petrolatum 86.5 % Oint Apply 1 Squirt topically 2 (two) times a day.     Antibiotics (From admission, onward)                Start     Stop Route Frequency Ordered    04/18/22 1930  metroNIDAZOLE tablet 500 mg         -- Oral Every 8 hours 04/18/22 1929    04/18/22 1215  sulfamethoxazole-trimethoprim 800-160mg per tablet 2 tablet         04/28 0859 Oral 2 times daily 04/18/22 1206          Antifungals (From admission, onward)                Start     Stop Route Frequency Ordered    04/14/22 1300  miconazole NITRATE 2 % top powder         -- Top 2 times daily 04/14/22 1155          Antivirals (From admission, onward)      None             Immunization History   Administered Date(s) Administered    Hepatitis B, Adult 01/09/2014, 02/13/2014    Influenza 01/14/2015    Influenza - Quadrivalent 09/18/2015    Influenza - Quadrivalent - PF *Preferred* (6 months and older) 11/18/2016, 10/24/2017, 10/31/2018, 09/04/2019, 09/29/2020,  2022    Influenza - Trivalent - PF (ADULT) 2015    Pneumococcal Polysaccharide - 23 Valent 10/24/2017    Tdap 2014       Family History       Problem Relation (Age of Onset)    Cataracts Father    Diabetes Father, Paternal Grandfather    Heart disease Father, Paternal Grandfather    Hypertension Father    No Known Problems Mother, Sister, Brother, Maternal Aunt, Maternal Uncle, Paternal Aunt, Paternal Uncle, Maternal Grandfather    Ovarian cancer Maternal Grandmother, Paternal Grandmother          Social History     Socioeconomic History    Marital status: Significant Other   Tobacco Use    Smoking status: Current Every Day Smoker     Packs/day: 1.00     Years: 37.00     Pack years: 37.00     Types: Cigarettes     Last attempt to quit: 2020     Years since quittin.3    Smokeless tobacco: Never Used    Tobacco comment: Enrolled in the [email or web address] on 5/3/14 ([facility] Member ID # [id number]). Ambulatory referral to Smoking Cessation Program   Substance and Sexual Activity    Alcohol use: No     Alcohol/week: 0.0 standard drinks    Drug use: No    Sexual activity: Yes     Partners: Male   Social History Narrative     from her  of 20 years    Lives by herself since      Review of Systems   Gastrointestinal:  Positive for diarrhea.        Diarrhea yesterday that has resolved; lower abdominal pain at site of ecchymoses   Musculoskeletal:         Bilateral leg erythema and swelling; bilateral foot wounds   Objective:     Vital Signs (Most Recent):  Temp: 97.3 °F (36.3 °C) (22 1123)  Pulse: 109 (22 1123)  Resp: 18 (22 1124)  BP: (!) 119/55 (22 1123)  SpO2: 98 % (22 1148)   Vital Signs (24h Range):  Temp:  [97.3 °F (36.3 °C)-99.5 °F (37.5 °C)] 97.3 °F (36.3 °C)  Pulse:  [108-130] 109  Resp:  [18-22] 18  SpO2:  [95 %-99 %] 98 %  BP: (118-146)/(55-70) 119/55     Weight: 107.8 kg (237 lb 10.5 oz)  Body mass index is 39.55 kg/m².    Estimated  Creatinine Clearance: 83 mL/min (based on SCr of 1 mg/dL).    Physical Exam  Cardiovascular:      Heart sounds: Normal heart sounds.   Pulmonary:      Breath sounds: Normal breath sounds.   Abdominal:      General: Bowel sounds are normal. There is no distension.      Tenderness: There is abdominal tenderness.      Comments: Positive tenderness to palpation of lower abdominal ecchymoses at injection sites with subcutaneous nodules   Musculoskeletal:      Right lower leg: Edema present.      Left lower leg: Edema present.      Comments: Bilateral edema and erythema to mid lower leg - both feet bandaged    Psychiatric:      Comments: Tangential in her speech, slightly agitated       Significant Labs: Blood Culture:   Recent Labs   Lab 04/13/22  0309 04/18/22  1946   LABBLOO No growth after 5 days.  No growth after 5 days. No Growth to date  No Growth to date     CBC:   Recent Labs   Lab 04/18/22  1246 04/19/22  0512   WBC 16.31* 18.05*   HGB 10.5* 10.7*   HCT 31.9* 35.4*   * 536*     CMP:   Recent Labs   Lab 04/18/22  1246 04/19/22  0512    134*   K 4.4 5.5*   CL 97 97   CO2 27 23   * 242*   BUN 19 29*   CREATININE 0.8 1.0   CALCIUM 10.5 10.5   PROT  --  7.9   ALBUMIN  --  3.4*   BILITOT  --  0.2   ALKPHOS  --  87   AST  --  14   ALT  --  25   ANIONGAP 14 14   EGFRNONAA >60 >60     Urine Studies:   Recent Labs   Lab 04/13/22  0143   COLORU Yellow   APPEARANCEUA Clear   PHUR 6.0   SPECGRAV 1.015   PROTEINUA Negative   GLUCUA 1+*   KETONESU Negative   BILIRUBINUA Negative   OCCULTUA Negative   NITRITE Negative   UROBILINOGEN Negative   LEUKOCYTESUR Negative     Wound Culture:   Recent Labs   Lab 04/13/22  1234   LABAERO METHICILLIN RESISTANT STAPHYLOCOCCUS AUREUS  Many  Skin rosenda also present  *       Significant Imaging:   CT abdomen and pelvis pending  4/13 MRI left foot without contrast - Impression:  Fragmentation and irregularity of the midfoot including the tarsal and metatarsal junctions  most compatible with Charcot foot.  Edema signal within the subcutaneous soft tissues of the plantar structures and plantar muscles as described.  Correlate for cellulitis and myositis without drainable abscess.  Reactive type edema from Charcot changes without discrete edema to suggest osteomyelitis.  Correlate with inflammatory markers and blood cultures if clinically warranted.    4/13 bilateral LE US  - Impression:  1. Visualization of a single right posterior tibial vein, an interval change from prior ultrasound of 12/16/2020 and concerning for thrombosis of 1 of the right posterior tibial veins.  2. Visualization of a single left posterior tibial vein, noting similar findings were present on prior ultrasound of 09/01/2019.  This is also suggestive of thrombosis of 1 of the left posterior tibial veins.  3. No additional DVT identified within the lower extremities bilaterally.  4. Enlarged left inguinal lymph nodes.  Clinical correlation advised.  5. Lower extremity edema, left greater than right.

## 2022-04-19 NOTE — PROGRESS NOTES
[facility] Medicine  Telemedicine Progress Note    Patient Name: Audrey Natarajan  MRN: [id number]  Patient Class: IP- Inpatient   Admission Date: 4/12/2022  Length of Stay: 1 days  Attending Physician: Diego Ridley MD  Primary Care Provider: Donaldo Pena MD          Subjective:     Principal Problem:Diabetic foot ulcer associated with type 2 diabetes mellitus        HPI:  Audrey Natarajan is a 50 yo female with a pmh of DM2, bipolar 1 disorder, cellulitis, COPD, HTN, CAD, DVT/PE. She presented to the ED with c/o fevers x 1 day. She also has BLE swelling and pain and wounds to both feet, worsening x 2 weeks. She had fever up to 102F yesterday. She reports the wounds to her right foot are from dragging her feet while she was angry. Denies drainage to foot wounds. She has had blood clots in the past and has an IVC filter placed in 2012. She was sent here from [facility] where she was under CEC for psychosis. ED workup revealed BLE DVT on venous US, WBC 31, and Hgb 9.9. She received a dose of vancomycin while in the ED.       Overview/Hospital Course:  Admitted for diabetic foot infection, cellulitis, and DVT.  Pt with h/o DVT/PE, hypercoagulable state, IVC filter in place. Therapeutic lovenox initiated.  Podiatry consulted for diabetic foot ulcer, debrided on 4/13 with cultures obtained - growing Staph aureus so far. On IV Clindamycin. Psych consulted for medication evaluation, pt with CEC from behavioral health facility.       Interval History: patient doing ok today, pending CT ab/pelv to evaluate hematoma;   - patient's leukocytosis continues to rise; ID and podiatry consulted concerning patient foot wound and appreciate recs    - will need inpatient psych once medically stable     Review of Systems   Constitutional:  Negative for activity change and fever.   Respiratory:  Negative for cough and shortness of breath.    Gastrointestinal:  Negative for abdominal pain  and nausea.   Hematological:  Bruises/bleeds easily.   Psychiatric/Behavioral:  Positive for agitation and behavioral problems.    Objective:     Vital Signs (Most Recent):  Temp: 99.3 °F (37.4 °C) (04/19/22 1624)  Pulse: 110 (04/19/22 1624)  Resp: 18 (04/19/22 1624)  BP: (!) 87/37 (04/19/22 1624)  SpO2: (!) 93 % (04/19/22 1624)   Vital Signs (24h Range):  Temp:  [97.3 °F (36.3 °C)-99.5 °F (37.5 °C)] 99.3 °F (37.4 °C)  Pulse:  [108-130] 110  Resp:  [18-22] 18  SpO2:  [93 %-99 %] 93 %  BP: ()/(37-70) 87/37     Weight: 107.8 kg (237 lb 10.5 oz)  Body mass index is 39.55 kg/m².    Intake/Output Summary (Last 24 hours) at 4/19/2022 1704  Last data filed at 4/18/2022 2244  Gross per 24 hour   Intake 320 ml   Output 250 ml   Net 70 ml        Physical Exam  Vitals and nursing note reviewed.   Constitutional:       General: She is not in acute distress.     Appearance: She is ill-appearing.   Pulmonary:      Effort: Pulmonary effort is normal.      Breath sounds: No wheezing.   Abdominal:      General: Abdomen is flat. There is no distension.      Comments: Abdominal hematoma   Musculoskeletal:         General: No swelling.      Left lower leg: No edema.   Skin:     Coloration: Skin is not jaundiced.      Findings: No rash.   Neurological:      General: No focal deficit present.      Mental Status: She is alert and oriented to person, place, and time.   Psychiatric:         Mood and Affect: Mood normal.         Behavior: Behavior normal.       Significant Labs: All pertinent labs within the past 24 hours have been reviewed.    Significant Imaging: I have reviewed all pertinent imaging results/findings within the past 24 hours.      Assessment/Plan:      * Diabetic foot ulcer associated with type 2 diabetes mellitus  B/l foot ulcers  - Podiatry consulted,  - Xray in feet bilaterally, findings consistent with Charcot joint of left foot, no acute abnormality   - MRI ordered edema noted as well as charcot changes of left  foot without evidence of osteomyelitis  - [facility] ulcer debrided with deep cultures taken.   - Site dressed with xeroform and kerlix. Nursing orders in for daily dressing changes  - She is NWB to [facility] due to ulcer and history of charcot foot.  - Will continue to follow  - Cont clindamycin - culture growing Staph aureus--sensitive to clinda.    4/18- cultures grew out MRSA and anerobic bacteria; patient is septic today and rising WBC; switching clinda to bactrim and adding flagyl; will have podiatry re-evaluate in AM and have ID consulted for abx regimen     Sepsis due to methicillin resistant Staphylococcus aureus (MRSA)  - see principle problem      Acute deep vein thrombosis (DVT) of both lower extremities  Hx of DVT/PE, hypercoagulable state, IVC filter in place  BLE venous US with thrombosis of bilateral posterior tibial veins    - Continue therapeutic lovenox      4/18- switching patient to eliquis today    Anemia  Denies bleeding, baseline 12-13, 9.9 on admit  Iron panel: Iron 13, TIBC 456, Sat Iron 3, Transferrin and Ferritin WNL    - Iron supplement  - Continue to monitor with daily CBC    SHAWN (obstructive sleep apnea)  Does not use CPAP    Psychosis  Bipolar 1 disorder    -Continue CEC from behavioral health facility  -Consulted psych for eval and medication management  - Cont Depakote and risperidone      Cellulitis of lower extremity  WBC 31, now resolved, lactic acid negative.  Given vanc in ED, switched to clindamycin.  BCx's NGTD.  Cellulitis is improving, difficult with b/l DVTs however will continue antibiotics with significant elevation of WBC.    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  Resumed home lisinopril.      VTE Risk Mitigation (From admission, onward)         Ordered     apixaban tablet 10 mg  2 times daily         04/18/22 1859     IP VTE HIGH RISK PATIENT  Once         04/13/22 0246     Place sequential compression device  Until discontinued         04/13/22 0246                      I have assessed these finding virtually using telemed platform and with assistance of bedside nurse                 The attending portion of this evaluation, treatment, and documentation was performed per Diego Ridley MD via Telemedicine AudioVisual using the secure [employer] software platform with 2 way audio/video. The provider was located off-site and the patient is located in the hospital. The aforementioned video software was utilized to document the relevant history and physical exam    Diego Ridley MD  Department of Hospital Medicine   [facility]

## 2022-04-19 NOTE — ASSESSMENT & PLAN NOTE
B/l foot ulcers  - Podiatry consulted,  - Xray in feet bilaterally, findings consistent with Charcot joint of left foot, no acute abnormality   - MRI ordered edema noted as well as charcot changes of left foot without evidence of osteomyelitis  - [facility] ulcer debrided with deep cultures taken.   - Site dressed with xeroform and kerlix. Nursing orders in for daily dressing changes  - She is NWB to [facility] due to ulcer and history of charcot foot.  - Will continue to follow  - Cont clindamycin - culture growing Staph aureus--sensitive to clinda.    4/18- cultures grew out MRSA and anerobic bacteria; patient is septic today and rising WBC; switching clinda to bactrim and adding flagyl; will have podiatry re-evaluate in AM and have ID consulted for abx regimen

## 2022-04-19 NOTE — SUBJECTIVE & OBJECTIVE
Interval History: patient doing ok today, having some agitation   - currently patient is CECed and needs further inpatient therapy    - patient's WBC is rising today; cultures from foot great MRSA and a anerobic bacteria; abx switched to bactrim and flagyl started  - will have podiatry re-evaluate foot in case it needs a wash out, will make npo after midnight; will also have ID weigh in on abx regimen     - patient also has a noted large lower abdominal hematoma, likely due to lovenox injections; getting CT ab pelv to further evaluate extent of it;     - will transfer to inpatient psych once medically stable;     Review of Systems   Constitutional:  Negative for activity change and fever.   Respiratory:  Negative for cough and shortness of breath.    Gastrointestinal:  Negative for abdominal pain and nausea.   Hematological:  Bruises/bleeds easily.   Psychiatric/Behavioral:  Positive for agitation and behavioral problems.    Objective:     Vital Signs (Most Recent):  Temp: 98.3 °F (36.8 °C) (04/18/22 1737)  Pulse: 108 (04/18/22 1737)  Resp: 18 (04/18/22 1737)  BP: (!) 123/57 (04/18/22 1737)  SpO2: 95 % (04/18/22 1737)   Vital Signs (24h Range):  Temp:  [97.1 °F (36.2 °C)-98.4 °F (36.9 °C)] 98.3 °F (36.8 °C)  Pulse:  [101-138] 108  Resp:  [18-20] 18  SpO2:  [95 %-99 %] 95 %  BP: (116-134)/(57-71) 123/57     Weight: 107.8 kg (237 lb 10.5 oz)  Body mass index is 39.55 kg/m².    Intake/Output Summary (Last 24 hours) at 4/18/2022 1932  Last data filed at 4/18/2022 0628  Gross per 24 hour   Intake --   Output 4600 ml   Net -4600 ml      Physical Exam  Vitals and nursing note reviewed.   Constitutional:       General: She is not in acute distress.     Appearance: She is ill-appearing.   Pulmonary:      Effort: Pulmonary effort is normal.      Breath sounds: No wheezing.   Abdominal:      General: Abdomen is flat. There is no distension.      Comments: Abdominal hematoma   Musculoskeletal:         General: No swelling.       Left lower leg: No edema.   Skin:     Coloration: Skin is not jaundiced.      Findings: No rash.   Neurological:      General: No focal deficit present.      Mental Status: She is alert and oriented to person, place, and time.   Psychiatric:         Mood and Affect: Mood normal.         Behavior: Behavior normal.       Significant Labs: All pertinent labs within the past 24 hours have been reviewed.    Significant Imaging: I have reviewed all pertinent imaging results/findings within the past 24 hours.

## 2022-04-20 DIAGNOSIS — E11.9 TYPE 2 DIABETES MELLITUS WITHOUT COMPLICATION: ICD-10-CM

## 2022-04-20 PROBLEM — N17.9 AKI (ACUTE KIDNEY INJURY): Status: ACTIVE | Noted: 2022-04-20

## 2022-04-20 LAB
ALBUMIN SERPL BCP-MCNC: 2.9 G/DL (ref 3.5–5.2)
ALP SERPL-CCNC: 61 U/L (ref 55–135)
ALT SERPL W/O P-5'-P-CCNC: 18 U/L (ref 10–44)
ANION GAP SERPL CALC-SCNC: 16 MMOL/L (ref 8–16)
ANISOCYTOSIS BLD QL SMEAR: SLIGHT
AST SERPL-CCNC: 22 U/L (ref 10–40)
BASOPHILS # BLD AUTO: 0.09 K/UL (ref 0–0.2)
BASOPHILS NFR BLD: 0.5 % (ref 0–1.9)
BILIRUB SERPL-MCNC: 0.2 MG/DL (ref 0.1–1)
BUN SERPL-MCNC: 49 MG/DL (ref 6–20)
BURR CELLS BLD QL SMEAR: ABNORMAL
CALCIUM SERPL-MCNC: 10.2 MG/DL (ref 8.7–10.5)
CHLORIDE SERPL-SCNC: 99 MMOL/L (ref 95–110)
CK SERPL-CCNC: 186 U/L (ref 20–180)
CO2 SERPL-SCNC: 15 MMOL/L (ref 23–29)
CREAT SERPL-MCNC: 2.4 MG/DL (ref 0.5–1.4)
CREAT UR-MCNC: 48.5 MG/DL (ref 15–325)
DIFFERENTIAL METHOD: ABNORMAL
EOSINOPHIL # BLD AUTO: 0.4 K/UL (ref 0–0.5)
EOSINOPHIL NFR BLD: 2.3 % (ref 0–8)
EOSINOPHIL URNS QL WRIGHT STN: NORMAL
ERYTHROCYTE [DISTWIDTH] IN BLOOD BY AUTOMATED COUNT: 15.7 % (ref 11.5–14.5)
EST. GFR  (AFRICAN AMERICAN): 27 ML/MIN/1.73 M^2
EST. GFR  (NON AFRICAN AMERICAN): 23 ML/MIN/1.73 M^2
GLUCOSE SERPL-MCNC: 160 MG/DL (ref 70–110)
HCT VFR BLD AUTO: 32.4 % (ref 37–48.5)
HGB BLD-MCNC: 10.5 G/DL (ref 12–16)
HYPOCHROMIA BLD QL SMEAR: ABNORMAL
IMM GRANULOCYTES # BLD AUTO: 0.72 K/UL (ref 0–0.04)
IMM GRANULOCYTES NFR BLD AUTO: 4.1 % (ref 0–0.5)
LYMPHOCYTES # BLD AUTO: 3.9 K/UL (ref 1–4.8)
LYMPHOCYTES NFR BLD: 22.1 % (ref 18–48)
MCH RBC QN AUTO: 27.4 PG (ref 27–31)
MCHC RBC AUTO-ENTMCNC: 32.4 G/DL (ref 32–36)
MCV RBC AUTO: 85 FL (ref 82–98)
MONOCYTES # BLD AUTO: 2.2 K/UL (ref 0.3–1)
MONOCYTES NFR BLD: 12.6 % (ref 4–15)
NEUTROPHILS # BLD AUTO: 10.3 K/UL (ref 1.8–7.7)
NEUTROPHILS NFR BLD: 58.4 % (ref 38–73)
NRBC BLD-RTO: 0 /100 WBC
PLATELET # BLD AUTO: 746 K/UL (ref 150–450)
PLATELET BLD QL SMEAR: ABNORMAL
PMV BLD AUTO: 10.1 FL (ref 9.2–12.9)
POCT GLUCOSE: 182 MG/DL (ref 70–110)
POCT GLUCOSE: 213 MG/DL (ref 70–110)
POIKILOCYTOSIS BLD QL SMEAR: SLIGHT
POLYCHROMASIA BLD QL SMEAR: ABNORMAL
POTASSIUM SERPL-SCNC: 5.4 MMOL/L (ref 3.5–5.1)
PROT SERPL-MCNC: 6.3 G/DL (ref 6–8.4)
PROT UR-MCNC: <7 MG/DL (ref 0–15)
PROT UR-MCNC: <7 MG/DL (ref 0–15)
PROT/CREAT UR: NORMAL MG/G{CREAT} (ref 0–0.2)
PROT/CREAT UR: NORMAL MG/G{CREAT} (ref 0–0.2)
RBC # BLD AUTO: 3.83 M/UL (ref 4–5.4)
SODIUM SERPL-SCNC: 130 MMOL/L (ref 136–145)
SODIUM UR-SCNC: 41 MMOL/L (ref 20–250)
SODIUM UR-SCNC: 41 MMOL/L (ref 20–250)
TARGETS BLD QL SMEAR: ABNORMAL
UUN UR-MCNC: 419 MG/DL (ref 140–1050)
VANCOMYCIN SERPL-MCNC: 30.3 UG/ML
WBC # BLD AUTO: 17.68 K/UL (ref 3.9–12.7)

## 2022-04-20 PROCEDURE — 63600175 PHARM REV CODE 636 W HCPCS: Performed by: NURSE PRACTITIONER

## 2022-04-20 PROCEDURE — 99233 PR SUBSEQUENT HOSPITAL CARE,LEVL III: ICD-10-PCS | Mod: AF,HB,, | Performed by: PSYCHIATRY & NEUROLOGY

## 2022-04-20 PROCEDURE — 82570 ASSAY OF URINE CREATININE: CPT | Performed by: HOSPITALIST

## 2022-04-20 PROCEDURE — 25000003 PHARM REV CODE 250: Performed by: PHYSICIAN ASSISTANT

## 2022-04-20 PROCEDURE — 84300 ASSAY OF URINE SODIUM: CPT | Performed by: HOSPITALIST

## 2022-04-20 PROCEDURE — 25000003 PHARM REV CODE 250: Performed by: NURSE PRACTITIONER

## 2022-04-20 PROCEDURE — 63600175 PHARM REV CODE 636 W HCPCS: Performed by: HOSPITALIST

## 2022-04-20 PROCEDURE — 90833 PR PSYCHOTHERAPY W/PATIENT W/E&M, 30 MIN (ADD ON): ICD-10-PCS | Mod: AF,HB,, | Performed by: PSYCHIATRY & NEUROLOGY

## 2022-04-20 PROCEDURE — 80202 ASSAY OF VANCOMYCIN: CPT | Performed by: STUDENT IN AN ORGANIZED HEALTH CARE EDUCATION/TRAINING PROGRAM

## 2022-04-20 PROCEDURE — 94761 N-INVAS EAR/PLS OXIMETRY MLT: CPT

## 2022-04-20 PROCEDURE — 27000207 HC ISOLATION

## 2022-04-20 PROCEDURE — 84540 ASSAY OF URINE/UREA-N: CPT | Performed by: HOSPITALIST

## 2022-04-20 PROCEDURE — 80053 COMPREHEN METABOLIC PANEL: CPT | Performed by: HOSPITALIST

## 2022-04-20 PROCEDURE — 25000003 PHARM REV CODE 250: Performed by: PSYCHIATRY & NEUROLOGY

## 2022-04-20 PROCEDURE — 25000003 PHARM REV CODE 250

## 2022-04-20 PROCEDURE — 99233 SBSQ HOSP IP/OBS HIGH 50: CPT | Mod: AF,HB,, | Performed by: PSYCHIATRY & NEUROLOGY

## 2022-04-20 PROCEDURE — 87205 SMEAR GRAM STAIN: CPT | Performed by: STUDENT IN AN ORGANIZED HEALTH CARE EDUCATION/TRAINING PROGRAM

## 2022-04-20 PROCEDURE — 99900035 HC TECH TIME PER 15 MIN (STAT)

## 2022-04-20 PROCEDURE — 36415 COLL VENOUS BLD VENIPUNCTURE: CPT | Performed by: STUDENT IN AN ORGANIZED HEALTH CARE EDUCATION/TRAINING PROGRAM

## 2022-04-20 PROCEDURE — 36415 COLL VENOUS BLD VENIPUNCTURE: CPT | Performed by: HOSPITALIST

## 2022-04-20 PROCEDURE — 94640 AIRWAY INHALATION TREATMENT: CPT

## 2022-04-20 PROCEDURE — 25000003 PHARM REV CODE 250: Performed by: STUDENT IN AN ORGANIZED HEALTH CARE EDUCATION/TRAINING PROGRAM

## 2022-04-20 PROCEDURE — 11000001 HC ACUTE MED/SURG PRIVATE ROOM

## 2022-04-20 PROCEDURE — 85025 COMPLETE CBC W/AUTO DIFF WBC: CPT | Performed by: HOSPITALIST

## 2022-04-20 PROCEDURE — 25000003 PHARM REV CODE 250: Performed by: HOSPITALIST

## 2022-04-20 PROCEDURE — 82550 ASSAY OF CK (CPK): CPT | Performed by: HOSPITALIST

## 2022-04-20 PROCEDURE — 90833 PSYTX W PT W E/M 30 MIN: CPT | Mod: AF,HB,, | Performed by: PSYCHIATRY & NEUROLOGY

## 2022-04-20 RX ORDER — FAMOTIDINE 20 MG/1
20 TABLET, FILM COATED ORAL DAILY
Status: DISCONTINUED | OUTPATIENT
Start: 2022-04-21 | End: 2022-04-26 | Stop reason: HOSPADM

## 2022-04-20 RX ORDER — SODIUM BICARBONATE 650 MG/1
1300 TABLET ORAL 2 TIMES DAILY
Status: DISCONTINUED | OUTPATIENT
Start: 2022-04-20 | End: 2022-04-22

## 2022-04-20 RX ORDER — HYDROXYZINE PAMOATE 25 MG/1
50 CAPSULE ORAL 3 TIMES DAILY
Status: DISCONTINUED | OUTPATIENT
Start: 2022-04-20 | End: 2022-04-26 | Stop reason: HOSPADM

## 2022-04-20 RX ORDER — LANOLIN ALCOHOL/MO/W.PET/CERES
400 CREAM (GRAM) TOPICAL 2 TIMES DAILY
Status: DISCONTINUED | OUTPATIENT
Start: 2022-04-20 | End: 2022-04-26 | Stop reason: HOSPADM

## 2022-04-20 RX ORDER — MUPIROCIN 20 MG/G
OINTMENT TOPICAL 2 TIMES DAILY
Status: DISPENSED | OUTPATIENT
Start: 2022-04-20 | End: 2022-04-25

## 2022-04-20 RX ADMIN — MICONAZOLE NITRATE 2 % TOPICAL POWDER: at 09:04

## 2022-04-20 RX ADMIN — DIVALPROEX SODIUM 1250 MG: 250 TABLET, DELAYED RELEASE ORAL at 09:04

## 2022-04-20 RX ADMIN — INSULIN ASPART 3 UNITS: 100 INJECTION, SOLUTION INTRAVENOUS; SUBCUTANEOUS at 09:04

## 2022-04-20 RX ADMIN — MUPIROCIN: 20 OINTMENT TOPICAL at 09:04

## 2022-04-20 RX ADMIN — PRAVASTATIN SODIUM 40 MG: 40 TABLET ORAL at 09:04

## 2022-04-20 RX ADMIN — SODIUM BICARBONATE 1300 MG: 650 TABLET ORAL at 02:04

## 2022-04-20 RX ADMIN — Medication 400 MG: at 09:04

## 2022-04-20 RX ADMIN — METRONIDAZOLE 500 MG: 500 TABLET ORAL at 09:04

## 2022-04-20 RX ADMIN — FUROSEMIDE 20 MG: 20 TABLET ORAL at 09:04

## 2022-04-20 RX ADMIN — RISPERIDONE 3 MG: 1 TABLET, ORALLY DISINTEGRATING ORAL at 09:04

## 2022-04-20 RX ADMIN — SODIUM BICARBONATE 1300 MG: 650 TABLET ORAL at 09:04

## 2022-04-20 RX ADMIN — FLUTICASONE FUROATE AND VILANTEROL TRIFENATATE 1 PUFF: 100; 25 POWDER RESPIRATORY (INHALATION) at 08:04

## 2022-04-20 RX ADMIN — ONDANSETRON 4 MG: 2 INJECTION INTRAMUSCULAR; INTRAVENOUS at 05:04

## 2022-04-20 RX ADMIN — APIXABAN 10 MG: 5 TABLET, FILM COATED ORAL at 09:04

## 2022-04-20 RX ADMIN — SODIUM CHLORIDE 1000 ML: 0.9 INJECTION, SOLUTION INTRAVENOUS at 01:04

## 2022-04-20 RX ADMIN — HYDROXYZINE PAMOATE 50 MG: 25 CAPSULE ORAL at 02:04

## 2022-04-20 RX ADMIN — GABAPENTIN 300 MG: 300 CAPSULE ORAL at 09:04

## 2022-04-20 RX ADMIN — ASPIRIN 81 MG CHEWABLE TABLET 81 MG: 81 TABLET CHEWABLE at 09:04

## 2022-04-20 RX ADMIN — Medication 6 MG: at 09:04

## 2022-04-20 RX ADMIN — FERROUS SULFATE TAB EC 325 MG (65 MG FE EQUIVALENT) 1 EACH: 325 (65 FE) TABLET DELAYED RESPONSE at 09:04

## 2022-04-20 RX ADMIN — FAMOTIDINE 20 MG: 20 TABLET ORAL at 09:04

## 2022-04-20 RX ADMIN — VANCOMYCIN HYDROCHLORIDE 2000 MG: 5 INJECTION, POWDER, LYOPHILIZED, FOR SOLUTION INTRAVENOUS at 02:04

## 2022-04-20 RX ADMIN — HYDROCODONE BITARTRATE AND ACETAMINOPHEN 1 TABLET: 10; 325 TABLET ORAL at 07:04

## 2022-04-20 RX ADMIN — DIVALPROEX SODIUM 1250 MG: 250 TABLET, DELAYED RELEASE ORAL at 12:04

## 2022-04-20 RX ADMIN — DIVALPROEX SODIUM 500 MG: 250 TABLET, DELAYED RELEASE ORAL at 09:04

## 2022-04-20 RX ADMIN — INSULIN ASPART 4 UNITS: 100 INJECTION, SOLUTION INTRAVENOUS; SUBCUTANEOUS at 05:04

## 2022-04-20 RX ADMIN — SODIUM ZIRCONIUM CYCLOSILICATE 5 G: 5 POWDER, FOR SUSPENSION ORAL at 02:04

## 2022-04-20 RX ADMIN — HYDROXYZINE PAMOATE 50 MG: 25 CAPSULE ORAL at 09:04

## 2022-04-20 RX ADMIN — ACETAMINOPHEN 650 MG: 325 TABLET ORAL at 05:04

## 2022-04-20 RX ADMIN — HYDROCODONE BITARTRATE AND ACETAMINOPHEN 1 TABLET: 10; 325 TABLET ORAL at 12:04

## 2022-04-20 RX ADMIN — METRONIDAZOLE 500 MG: 500 TABLET ORAL at 01:04

## 2022-04-20 RX ADMIN — RISPERIDONE 2 MG: 1 TABLET, ORALLY DISINTEGRATING ORAL at 09:04

## 2022-04-20 RX ADMIN — METRONIDAZOLE 500 MG: 500 TABLET ORAL at 05:04

## 2022-04-20 NOTE — SUBJECTIVE & OBJECTIVE
Interval History: patient is very agitated today and verbally abusing nursing staff, psych following and adjusting medications and needs continued CEC and inpatient psych once medically stable    - wound cultures growing MRSA, starting on vanc as well as growing anerobes on flagyl; leukocytosis improving today; follow up ID and podiatry recs    - patient's Cr went to 2.4 from 1.0; stopping lasix; giving 1L NS bolus; getting urine studies and renal U/S and nephrology has been consulted   - CT ab/pelv wo shows panniculitis/cellulitis of lower abdominal wall    - will be placed back onto inpatient service today     Review of Systems   Constitutional:  Negative for activity change and fever.   Respiratory:  Negative for cough and shortness of breath.    Gastrointestinal:  Negative for abdominal pain and nausea.   Hematological:  Bruises/bleeds easily.   Psychiatric/Behavioral:  Positive for agitation and behavioral problems.    Objective:     Vital Signs (Most Recent):  Temp: 97.8 °F (36.6 °C) (04/20/22 1604)  Pulse: (!) 120 (04/20/22 1604)  Resp: 20 (04/20/22 1604)  BP: (!) 121/56 (04/20/22 1146)  SpO2: 98 % (04/20/22 1604)   Vital Signs (24h Range):  Temp:  [96.4 °F (35.8 °C)-97.8 °F (36.6 °C)] 97.8 °F (36.6 °C)  Pulse:  [] 120  Resp:  [17-20] 20  SpO2:  [95 %-100 %] 98 %  BP: ()/(50-66) 121/56     Weight: 107.8 kg (237 lb 10.5 oz)  Body mass index is 39.55 kg/m².    Intake/Output Summary (Last 24 hours) at 4/20/2022 1632  Last data filed at 4/20/2022 1412  Gross per 24 hour   Intake 1480 ml   Output --   Net 1480 ml        Physical Exam  Vitals and nursing note reviewed.   Constitutional:       General: She is not in acute distress.     Appearance: She is ill-appearing.   Pulmonary:      Effort: Pulmonary effort is normal.      Breath sounds: No wheezing.   Abdominal:      General: Abdomen is flat. There is no distension.      Comments: Abdominal hematoma   Musculoskeletal:         General: No swelling.       Left lower leg: No edema.   Skin:     Coloration: Skin is not jaundiced.      Findings: No rash.   Neurological:      General: No focal deficit present.      Mental Status: She is alert and oriented to person, place, and time.   Psychiatric:         Mood and Affect: Mood normal.         Behavior: Behavior normal.       Significant Labs: All pertinent labs within the past 24 hours have been reviewed.    Significant Imaging: I have reviewed all pertinent imaging results/findings within the past 24 hours.

## 2022-04-20 NOTE — PROGRESS NOTES
Pharmacokinetic Initial Assessment: IV Vancomycin    Assessment/Plan:    Initiate intravenous vancomycin with loading dose of 2000 mg (dose capped d/t ODESSA) once followed by a maintenance dose of vancomycin 1250mg IV every 24 hours  Desired empiric serum trough concentration is 10 to 15 mcg/mL  Draw vancomycin trough level 60 min prior to third dose on 4/22 at approximately 0930  Pharmacy will continue to follow and monitor vancomycin.      Please contact pharmacy at extension [phone] with any questions regarding this assessment.     Thank you for the consult,   Rudi Arredondo       Patient brief summary:  Audrey Natarajan is a 51 y.o. female initiated on antimicrobial therapy with IV Vancomycin for treatment of suspected skin & soft tissue infection    Drug Allergies:   Review of patient's allergies indicates:   Allergen Reactions    Morphine Other (See Comments)     Patient had a psychotic episode after taking Morphine  Agitation, hallucinations    Penicillins Anaphylaxis     itching    Januvia [sitagliptin] Hives       Actual Body Weight:       Renal Function:   Estimated Creatinine Clearance: 34.6 mL/min (A) (based on SCr of 2.4 mg/dL (H)).,     Dialysis Method (if applicable):      CBC (last 72 hours):  Recent Labs   Lab Result Units 04/17/22  1050 04/18/22  1246 04/19/22  0512   WBC K/uL 14.34* 16.31* 18.05*   Hemoglobin g/dL 10.3* 10.5* 10.7*   Hematocrit % 32.1* 31.9* 35.4*   Platelets K/uL 621* 683* 536*   Gran % % 47.6 53.3 78.0*   Lymph % % 33.3 29.8 15.0*   Mono % % 10.1 8.4 1.0*   Eosinophil % % 4.5 3.3 1.0   Basophil % % 0.8 0.7 2.0*   Differential Method  Automated Automated Manual       Metabolic Panel (last 72 hours):  Recent Labs   Lab Result Units 04/17/22  1050 04/18/22  1246 04/19/22  0512 04/19/22  1745 04/20/22  0501   Sodium mmol/L 134* 138 134*  --  130*   Potassium mmol/L 4.9 4.4 5.5* 4.6 5.4*   Chloride mmol/L 96 97 97  --  99   CO2 mmol/L 24 27 23  --  15*   Glucose mg/dL 215* 237* 242*  --   160*   BUN mg/dL 13 19 29*  --  49*   Creatinine mg/dL 0.7 0.8 1.0  --  2.4*   Albumin g/dL  --   --  3.4*  --  2.9*   Total Bilirubin mg/dL  --   --  0.2  --  0.2   Alkaline Phosphatase U/L  --   --  87  --  61   AST U/L  --   --  14  --  22   ALT U/L  --   --  25  --  18   Magnesium mg/dL 1.5*  --   --   --   --    Phosphorus mg/dL 4.3  --   --   --   --        Drug levels (last 3 results):  No results for input(s): VANCOMYCINRA, VANCOMYCINPE, VANCOMYCINTR in the last 72 hours.    Microbiologic Results:  Microbiology Results (last 7 days)       Procedure Component Value Units Date/Time    Blood culture [[id number]] Collected: 04/18/22 1946    Order Status: Completed Specimen: Blood from Antecubital, Left Arm Updated: 04/20/22 0612     Blood Culture, Routine No Growth to date      No Growth to date    Blood culture [[id number]] Collected: 04/18/22 1946    Order Status: Completed Specimen: Blood from Antecubital, Right Arm Updated: 04/20/22 0612     Blood Culture, Routine No Growth to date      No Growth to date    Culture, Anaerobe [[id number]]  (Abnormal) Collected: 04/13/22 1234    Order Status: Completed Specimen: Wound from Foot, Left Updated: 04/18/22 1226     Anaerobic Culture FINEGOLDIA MAGNA  Moderate      Blood culture [[id number]] Collected: 04/13/22 0309    Order Status: Completed Specimen: Blood from Peripheral, Antecubital, Left Updated: 04/18/22 1212     Blood Culture, Routine No growth after 5 days.    Blood culture [[id number]] Collected: 04/13/22 0309    Order Status: Completed Specimen: Blood from Peripheral, Antecubital, Left Updated: 04/18/22 1212     Blood Culture, Routine No growth after 5 days.    Aerobic culture [[id number]]  (Abnormal)  (Susceptibility) Collected: 04/13/22 1234    Order Status: Completed Specimen: Wound from Foot, Left Updated: 04/16/22 1201     Aerobic Bacterial Culture METHICILLIN RESISTANT STAPHYLOCOCCUS AUREUS  Many  Skin rosenda also present      Culture, Anaerobe [[id number]]      Order Status: No result Specimen: Wound     Aerobic culture [[id number]]     Order Status: No result Specimen: Wound

## 2022-04-20 NOTE — ASSESSMENT & PLAN NOTE
4/20- Cr jumped to 2.4 from 1.0 today; ATN versus pre-renal; lasix stopped; giving 1L NS bolus; urine studies and renal U/S ordered and nephrology consulted

## 2022-04-20 NOTE — PROGRESS NOTES
"[place] - Telemetry  Adult Nutrition  Progress Note    SUMMARY       Recommendations    Recommendation:   1. Continue current diabetic 1500 kcal diet as tolerated.  2. Addition of Optisource 1x/day to supplement protein needs.   3. Addition of Khurram BID to promote wound healing.   4. Monitor weight/labs.   5. RD to follow and monitor intake    Goals:   Pt intake >/= 75% EEN/EPN by RD follow up    Nutrition Goal Status: progressing towards goal  Communication of RD Recs: other (comment) (POC)    Assessment and Plan    * Diabetic foot ulcer associated with type 2 diabetes mellitus  Contributing Nutrition Diagnosis  Increased nurtrient needs, protein    Related to (etiology):   Wound healing    Signs and Symptoms (as evidenced by):   Pt with diabetic foot ulcer of both feet, and cellulitis of lower extremity     Interventions:  Collaboration with other providers  [employer]- Optisource 1x/day  Modified Beverage- Khurram BID    Nutrition Diagnosis Status:   Continues           Reason for Assessment    Reason For Assessment: RD follow-up  Diagnosis: infection/sepsis (cellulitis of lower extremity)  Relevant Medical History: COPD, DMT2, hypercholesteremia, irregular heartbeat, DVT, PE, CAD, bipolar 1 disorder, neuromuscular disorder, RLS, depression, HTN, ADHD, splenectomy, hernia repair, cholecystectomy  Interdisciplinary Rounds: did not attend  General Information Comments: Pt receiving diabetic 1500 kcal diet with 100% intake, Optisource and Khurram BID awaiting second sign. States she "eats everything", has a little bit of N/V daily, and a little bit of diarrhea daily, but is not straining to have BM. MRSA to foot wound per chart. Pt having blood drawn by RN. Santiago Score: 22 bilateral foot wounds. NFPE (4/13) not warrented at this time pt with 50 lbs weight gain in the past year per weight history  Nutrition Discharge Planning: d/c on diabetic cardiac diet with Khurram BID for 14 days    Nutrition Risk " "Screen    Nutrition Risk Screen: large or nonhealing wound, burn or pressure injury    Nutrition/Diet History    Food Preferences: no cultural or spiritual food preferences identified  Food Allergies: NKFA  Factors Affecting Nutritional Intake: None identified at this time    Anthropometrics    Temp: 96.4 °F (35.8 °C)  Height Method: Estimated  Height: 5' 5" (165.1 cm)  Height (inches): 65 in  Weight Method: Bed Scale  Weight: 107.8 kg (237 lb 10.5 oz)  Weight (lb): 237.66 lb  Ideal Body Weight (IBW), Female: 125 lb  % Ideal Body Weight, Female (lb): 193.83 %  BMI (Calculated): 39.5  BMI Grade: 35 - 39.9 - obesity - grade II       Lab/Procedures/Meds    Pertinent Labs Reviewed: reviewed  Pertinent Labs Comments: Na 130, K 5.4, CO2 15,. BUN 49, Cr 2.4, eGFR 23, Glu 160, Alb 2.9, A1C 7.0  Pertinent Medications Reviewed: reviewed  Pertinent Medications Comments: apixaban, aspirin, divalproex, famotidine, ferrous sulfate, furosemide, gabapentin, metronidazole, nicotine patch, statin, risperidone      Estimated/Assessed Needs    Weight Used For Calorie Calculations: 109.9 kg (242 lb 4.6 oz)  Energy Calorie Requirements (kcal): 1725  Energy Need Method: Kiron-St Jeor (x 1.0 2/2 BMI > 40)  Protein Requirements: 88 (0.8 gm/kg 2/2 BMI >40)  Weight Used For Protein Calculations: 109.9 kg (242 lb 4.6 oz)     Estimated Fluid Requirement Method: RDA Method (or PER MD)  RDA Method (mL): 1725  CHO Requirement: 195 gm/day      Nutrition Prescription Ordered    Current Diet Order: Diabetic 1500 kcal    Evaluation of Received Nutrient/Fluid Intake    I/O: +480  Energy Calories Required: meeting needs  Protein Required: not meeting needs  Fluid Required: meeting needs  Comments: LBM 4/18  Tolerance: tolerating  % Intake of Estimated Energy Needs: 75 - 100 %  % Meal Intake: 75 - 100 %    Nutrition Risk    Level of Risk/Frequency of Follow-up:  (1x/week)     Monitor and Evaluation    Food and Nutrient Intake: energy intake, food and " beverage intake  Food and Nutrient Adminstration: diet order  Knowledge/Beliefs/Attitudes: food and nutrition knowledge/skill  Physical Activity and Function: nutrition-related ADLs and IADLs  Anthropometric Measurements: weight, weight change, body mass index  Biochemical Data, Medical Tests and Procedures: gastrointestinal profile, electrolyte and renal panel, glucose/endocrine profile, inflammatory profile, lipid profile  Nutrition-Focused Physical Findings: overall appearance     Nutrition Follow-Up    RD Follow-up?: Yes

## 2022-04-20 NOTE — SUBJECTIVE & OBJECTIVE
Subjective:     Interval History: Hypotensive, other vitals stable. WBCs increasing. Dressings dirty, unraveled, with serous strikethrough. Patient ambulating in room when podiatry arrived.       Scheduled Meds:   apixaban  10 mg Oral BID    aspirin  81 mg Oral Daily    divalproex  1,250 mg Oral QHS    divalproex  500 mg Oral Daily    famotidine  20 mg Oral BID    ferrous sulfate  1 tablet Oral Daily    fluticasone furoate-vilanteroL  1 puff Inhalation Daily    furosemide  20 mg Oral Daily    gabapentin  300 mg Oral BID    metroNIDAZOLE  500 mg Oral Q8H    miconazole NITRATE 2 %   Topical (Top) BID    nicotine  1 patch Transdermal Daily    pravastatin  40 mg Oral QHS    risperiDONE  2 mg Oral Daily    risperiDONE  3 mg Oral Nightly    sulfamethoxazole-trimethoprim 800-160mg  2 tablet Oral BID     Continuous Infusions:  PRN Meds:acetaminophen, albuterol-ipratropium, dextrose 10%, dextrose 10%, glucagon (human recombinant), glucose, glucose, HYDROcodone-acetaminophen, hydrocortisone, hydrOXYzine pamoate, insulin aspart U-100, melatonin, naloxone, ondansetron, senna-docusate 8.6-50 mg, sodium chloride 0.9%    Review of Systems   Constitutional:  Negative for chills, diaphoresis, fatigue and fever.   Respiratory:  Negative for cough and shortness of breath.    Cardiovascular:  Negative for chest pain and leg swelling.   Gastrointestinal:  Negative for nausea and vomiting.   Musculoskeletal:  Positive for arthralgias and joint swelling. Negative for back pain and gait problem.   Skin:  Positive for color change and wound. Negative for pallor and rash.   Neurological:  Positive for numbness. Negative for tremors, speech difficulty and weakness.   Psychiatric/Behavioral:  Negative for agitation. The patient is not nervous/anxious.    Objective:     Vital Signs (Most Recent):  Temp: 96.6 °F (35.9 °C) (04/20/22 0734)  Pulse: 82 (04/20/22 0851)  Resp: 18 (04/20/22 0851)  BP: (!) 91/55 (04/20/22 0734)  SpO2: 96 % (04/20/22  0851)   Vital Signs (24h Range):  Temp:  [96.6 °F (35.9 °C)-99.3 °F (37.4 °C)] 96.6 °F (35.9 °C)  Pulse:  [] 82  Resp:  [17-20] 18  SpO2:  [93 %-100 %] 96 %  BP: ()/(37-66) 91/55     Weight: 107.8 kg (237 lb 10.5 oz)  Body mass index is 39.55 kg/m².    Foot Exam    General  General Appearance: appears stated age and healthy   Orientation: alert and oriented to person, place, and time   Affect: appropriate       Right Foot/Ankle     Inspection and Palpation  Ecchymosis: none  Tenderness: none   Swelling: none   Skin Exam: ulcer;     Neurovascular  Dorsalis pedis: 1+  Posterior tibial: 1+  Saphenous nerve sensation: diminished  Tibial nerve sensation: diminished  Superficial peroneal nerve sensation: diminished  Deep peroneal nerve sensation: diminished  Sural nerve sensation: diminished      Left Foot/Ankle      Inspection and Palpation  Ecchymosis: none  Tenderness: none   Swelling: (midfoot)  Skin Exam: ulcer and erythema;     Neurovascular  Dorsalis pedis: 1+  Posterior tibial: 1+  Saphenous nerve sensation: diminished  Tibial nerve sensation: diminished  Superficial peroneal nerve sensation: diminished  Deep peroneal nerve sensation: diminished  Sural nerve sensation: diminished        Laboratory:  Blood Cultures:   Recent Labs   Lab 04/18/22  1946   LABBLOO No Growth to date  No Growth to date  No Growth to date  No Growth to date     CBC:   Recent Labs   Lab 04/19/22  0512   WBC 18.05*   RBC 3.91*   HGB 10.7*   HCT 35.4*   *   MCV 91   MCH 27.4   MCHC 30.2*     CMP:   Recent Labs   Lab 04/20/22  0501   *   CALCIUM 10.2   ALBUMIN 2.9*   PROT 6.3   *   K 5.4*   CO2 15*   CL 99   BUN 49*   CREATININE 2.4*   ALKPHOS 61   ALT 18   AST 22   BILITOT 0.2     CRP:   Recent Labs   Lab 04/18/22  1246   CRP 56.3*     ESR:   Recent Labs   Lab 04/18/22  1246   SEDRATE 96*     Microbiology Results (last 7 days)       Procedure Component Value Units Date/Time    Blood culture [[id number]]  Collected: 04/18/22 1946    Order Status: Completed Specimen: Blood from Antecubital, Left Arm Updated: 04/20/22 0612     Blood Culture, Routine No Growth to date      No Growth to date    Blood culture [[id number]] Collected: 04/18/22 1946    Order Status: Completed Specimen: Blood from Antecubital, Right Arm Updated: 04/20/22 0612     Blood Culture, Routine No Growth to date      No Growth to date    Culture, Anaerobe [[id number]]  (Abnormal) Collected: 04/13/22 1234    Order Status: Completed Specimen: Wound from Foot, Left Updated: 04/18/22 1226     Anaerobic Culture FINEGOLDIA MAGNA  Moderate      Blood culture [[id number]] Collected: 04/13/22 0309    Order Status: Completed Specimen: Blood from Peripheral, Antecubital, Left Updated: 04/18/22 1212     Blood Culture, Routine No growth after 5 days.    Blood culture [[id number]] Collected: 04/13/22 0309    Order Status: Completed Specimen: Blood from Peripheral, Antecubital, Left Updated: 04/18/22 1212     Blood Culture, Routine No growth after 5 days.    Aerobic culture [[id number]]  (Abnormal)  (Susceptibility) Collected: 04/13/22 1234    Order Status: Completed Specimen: Wound from Foot, Left Updated: 04/16/22 1201     Aerobic Bacterial Culture METHICILLIN RESISTANT STAPHYLOCOCCUS AUREUS  Many  Skin rosenda also present      Culture, Anaerobe [[id number]]     Order Status: No result Specimen: Wound     Aerobic culture [[id number]]     Order Status: No result Specimen: Wound           Specimen (24h ago, onward)                None              Clinical Findings:      LLE with erythema and edema noted, stable since last seen.   -Ulcer to plantar left midfoot with periwound hyperkeratosis and dry skin. Fibronecrotic base. Appears to extend to subcutaneous tissue. Does not probe to bone. No fluctuance or crepitus noted. Not tender.   -Ulcer to left 1st metatarsal head area extending to dermis. Not clinically infected.         Ulcer to plantar right distal forefoot and  plantar right hallux, to dermis, with fibrogranular base. No purulence or acute signs of infection. Not tender.

## 2022-04-20 NOTE — CONSULTS
Nephrology Consult  H&P      Consult Requested By: Aniya Mcintosh MD  Reason for Consult: ODESSA     SUBJECTIVE:     History of Present Illness:  Audrey Natarajan is a 51 y.o. \ female who  has a past medical history of ADHD (attention deficit hyperactivity disorder), Arthritis, Asthma, Bipolar 1 disorder, Cataract, COPD (chronic obstructive pulmonary disease), Coronary artery disease, Depression, Diabetes mellitus, DVT of lower extremity, bilateral (July 2013), Encounter for blood transfusion, History of blood clots (1. Left Leg=2003; 2.Bilateral Groin=Blood Clots= 5 or 6/ 2013 & 7/2013; 3. LLL of Lung=7/2013;  4. Lt. Lower Leg=7/2013. ), HTN (hypertension) (6/6/2013), Hypercholesteremia, Irregular heartbeat, Neuromuscular disorder, PE (pulmonary embolism) (July 2013 ), and Restless leg syndrome.. The patient presented to the [facility] on 4/12/2022 with a primary complaint of foot infection.   ?    Review of Systems   Constitutional: Negative for chills and fever.   HENT: Negative for congestion and sore throat.    Eyes: Negative for blurred vision, double vision and photophobia.   Respiratory: Negative for cough and shortness of breath.    Cardiovascular: Negative for chest pain, palpitations and leg swelling.   Gastrointestinal: Negative for abdominal pain, diarrhea, nausea and vomiting.   Genitourinary: Negative for dysuria and urgency.   Musculoskeletal: Negative for joint pain and myalgias.   Skin: Negative for itching and rash.   Neurological: Negative for dizziness, sensory change, weakness and headaches.   Endo/Heme/Allergies: Negative for polydipsia. Does not bruise/bleed easily.   Psychiatric/Behavioral: Positive for depression. The patient is nervous/anxious.        Past Medical History:   Diagnosis Date    ADHD (attention deficit hyperactivity disorder)     Arthritis     Asthma     Bipolar 1 disorder     Cataract     COPD (chronic obstructive pulmonary disease)     Coronary artery  "disease     A fib    Depression     bipolar manic depresson    Diabetes mellitus     DVT of lower extremity, bilateral 2013    bilateral LE DVT. [place] filter placed.     Encounter for blood transfusion     History of blood clots 1. Left Leg=; 2.Bilateral Groin=Blood Clots=  or 2013 & 2013; 3. LLL of Lung=2013;  4. Lt. Lower Leg=2013.     Pt. had 1st Blood Clot after [id number], & Last=. [place] Filter= Rt.Lateral Neck.    HTN (hypertension) 2013    Pt states that she does not have hypertension    Hypercholesteremia     Irregular heartbeat     Neuromuscular disorder     neuropathy feet    PE (pulmonary embolism) 2013     bilat LE DVT.     Restless leg syndrome      Past Surgical History:   Procedure Laterality Date    ABDOMINAL SURGERY      gastric sleeve    BILATERAL OOPHORECTOMY Bilateral 2015    CHOLECYSTECTOMY      Green' s filter Right 2012    Right Neck & Tunneled Down.    HERNIA REPAIR      "[place] of Hernias Repaires around th Belly Button.", pt. states    LAPAROSCOPIC CHOLECYSTECTOMY N/A 9/10/2020    Procedure: CHOLECYSTECTOMY, LAPAROSCOPIC;  Surgeon: Montrell Gutierrez MD;  Location: [facility];  Service: General;  Laterality: N/A;  RN PREOP ----COVID Negative      OVARIAN CYST REMOVAL  3/13/2014    [place] REMOVAL OF OVARY/TUBE(S)      SPLENECTOMY, TOTAL  2003    TONSILLECTOMY      as a child    TYMPANOSTOMY TUBE PLACEMENT  1976    VEIN SURGERY      Lt leg     Family History   Problem Relation Age of Onset    Hypertension Father     Diabetes Father     Heart disease Father     Cataracts Father     Diabetes Paternal Grandfather     Heart disease Paternal Grandfather     No Known Problems Mother     Ovarian cancer Maternal Grandmother          from this. ? age     No Known Problems Sister     No Known Problems Brother     No Known Problems Maternal Aunt     No Known Problems Maternal Uncle     No Known " Problems Paternal Aunt     No Known Problems Paternal Uncle     No Known Problems Maternal Grandfather     Ovarian cancer Paternal Grandmother     Uterine cancer Neg Hx     Breast cancer Neg Hx     Colon cancer Neg Hx     Amblyopia Neg Hx     Blindness Neg Hx     Cancer Neg Hx     Glaucoma Neg Hx     Macular degeneration Neg Hx     Retinal detachment Neg Hx     Strabismus Neg Hx     Stroke Neg Hx     Thyroid disease Neg Hx      Social History     Tobacco Use    Smoking status: Current Every Day Smoker     Packs/day: 1.00     Years: 37.00     Pack years: 37.00     Types: Cigarettes     Last attempt to quit: 2020     Years since quittin.3    Smokeless tobacco: Never Used    Tobacco comment: Enrolled in the [employer] on 5/3/14 ([facility] Member ID # [id number]). Ambulatory referral to Smoking Cessation Program   Substance Use Topics    Alcohol use: No     Alcohol/week: 0.0 standard drinks    Drug use: No       Review of patient's allergies indicates:   Allergen Reactions    Morphine Other (See Comments)     Patient had a psychotic episode after taking Morphine  Agitation, hallucinations    Penicillins Anaphylaxis     itching    Januvia [sitagliptin] Hives            OBJECTIVE:     Vital Signs (Most Recent)  Vitals:    22 0545 22 0734 22 0851 22 1146   BP: (!) 105/56 (!) 91/55  (!) 121/56   BP Location: Left arm Left arm  Left arm   Patient Position: Sitting Sitting  Lying   Pulse: 109 (!) 119 82 (!) 117   Resp: 17 20 18 20   Temp:  96.6 °F (35.9 °C)  96.4 °F (35.8 °C)   TempSrc:  Oral  Oral   SpO2: 100% 99% 97% 99%   Weight:       Height:                     Medications:   apixaban  10 mg Oral BID    aspirin  81 mg Oral Daily    divalproex  1,250 mg Oral QHS    divalproex  500 mg Oral Daily    [START ON 2022] famotidine  20 mg Oral Daily    ferrous sulfate  1 tablet Oral Daily    fluticasone furoate-vilanteroL  1 puff Inhalation Daily    gabapentin   300 mg Oral BID    hydrOXYzine pamoate  50 mg Oral TID    metroNIDAZOLE  500 mg Oral Q8H    miconazole NITRATE 2 %   Topical (Top) BID    mupirocin   Nasal BID    nicotine  1 patch Transdermal Daily    pravastatin  40 mg Oral QHS    risperiDONE  2 mg Oral Daily    risperiDONE  3 mg Oral Nightly    vancomycin (VANCOCIN) IVPB  2,000 mg Intravenous Once    [START ON 4/21/2022] vancomycin (VANCOCIN) IVPB  1,250 mg Intravenous Q24H           Physical Exam  Vitals and nursing note reviewed.   Constitutional:       General: She is not in acute distress.     Appearance: She is obese. She is not diaphoretic.   HENT:      Head: Normocephalic and atraumatic.      Mouth/Throat:      Pharynx: No oropharyngeal exudate.   Eyes:      General: No scleral icterus.     Conjunctiva/sclera: Conjunctivae normal.      Pupils: Pupils are equal, round, and reactive to light.   Cardiovascular:      Rate and Rhythm: Normal rate and regular rhythm.      Heart sounds: Normal heart sounds. No murmur heard.  Pulmonary:      Effort: Pulmonary effort is normal. No respiratory distress.      Breath sounds: Normal breath sounds.   Abdominal:      General: Bowel sounds are normal. There is no distension.      Palpations: Abdomen is soft.      Tenderness: There is no abdominal tenderness.   Musculoskeletal:         General: Normal range of motion.      Cervical back: Normal range of motion and neck supple.      Comments: Foot wound    Skin:     General: Skin is warm and dry.      Findings: No erythema.   Neurological:      Mental Status: She is alert and oriented to person, place, and time.      Cranial Nerves: No cranial nerve deficit.   Psychiatric:         Mood and Affect: Affect normal.         Cognition and Memory: Memory normal.         Judgment: Judgment normal.         Laboratory:  Recent Labs   Lab 04/18/22  1246 04/19/22  0512 04/20/22  1312   WBC 16.31* 18.05* 17.68*   HGB 10.5* 10.7* 10.5*   HCT 31.9* 35.4* 32.4*   * 536*  746*   MONO 8.4  1.4* 1.0* 12.6  2.2*     Recent Labs   Lab 04/17/22  1050 04/18/22  1246 04/19/22  0512 04/19/22  1745 04/20/22  0501   * 138 134*  --  130*   K 4.9 4.4 5.5* 4.6 5.4*   CL 96 97 97  --  99   CO2 24 27 23  --  15*   BUN 13 19 29*  --  49*   CREATININE 0.7 0.8 1.0  --  2.4*   CALCIUM 10.1 10.5 10.5  --  10.2   PHOS 4.3  --   --   --   --        Diagnostic Results:  X-Ray: Reviewed  US: Reviewed  Echo: Reviewed  ASSESSMENT/PLAN:     1. ODESSA - big jump from baseline 0.9 to 2.4  ? Due to Bactrim also ddx:   ATN, Sepsis,  glomerulonephritis, AIN   -- get US kidney to rule out obstruction, patient is poor historian and very frustrated  with all situation   -- Check Vanc levels before each dose  Already off Bactrim   Treat - infection, need strict I&O not sure about UOP    -- Hold off on serological work up for now,  get basic   -- No Indication for RRT at this time, K+ acceptable, No Uremia symptoms.  -- Daily Renal Function Panel  -- Avoid Hypotension.  -- Renally dose all meds  -- Please avoid nephrotoxins, including NSAIDs, aminoglycosides, IV contrast (unless absolutely necessary), gadolinium, fleets and other phosphorous-based laxatives. Caution with antibiotics.  2. Hyperkalemia  -  Rule out obstruction, give lokelma  10 x1   3. Acidosis -  PO bicarbonate avoid NS     Anemia ID - on PO iron   Recent Labs   Lab 04/18/22  1246 04/19/22  0512 04/20/22  1312   HGB 10.5* 10.7* 10.5*   HCT 31.9* 35.4* 32.4*   * 536* 746*       Iron   Lab Results   Component Value Date    IRON 13 (L) 04/13/2022    TIBC 456 (H) 04/13/2022    FERRITIN 84 04/13/2022       4. MBD (E88.9 M90.80) -  Recent Labs   Lab 04/17/22  1050 04/18/22  1246 04/20/22  0501   CALCIUM 10.1   < > 10.2   PHOS 4.3  --   --     < > = values in this interval not displayed.   Hypomagnesemia   Recent Labs   Lab 04/17/22  1050   MG 1.5*   replace Mg     Lab Results   Component Value Date    CALCIUM 10.2 04/20/2022    PHOS 4.3 04/17/2022      Lab Results   Component Value Date    [id number] 35 02/09/2017       Lab Results   Component Value Date    CO2 15 (L) 04/20/2022       5. Nutrition/Hypoalbuminemia (E88.09) -    Recent Labs   Lab 04/19/22  0512 04/19/22  0640 04/20/22  0501   LABPROT  --  10.3  --    ALBUMIN 3.4*  --  2.9*     Nepro with meals TID. Renal vitamins daily      Thank you for the consult, will follow  With any question please call [phone]  Madhuri Fong MD    [facility]  FARHAD Mai MD, FACPHECTOR MD,   OMD PADDY Carreon MD E. V. Harmon, NP    [address]  [place], [address] [phone]

## 2022-04-20 NOTE — PLAN OF CARE
Plan of care discussed with pt. PT AAOx4, easily agitated but cooperative. Ambulates frequently in and out of room w/ BLE dressing unraveling- foot wounds cleaned and dressing changed on 4/20 at 0400. VS stable overnight, afebrile. All scheduled meds taken. Pt report pain to foot- managed w/ Norco PRN.  Educated on keep feet elevated on pillow. Pt educated on fall precautions, verbalized understanding. Call light in reach. Pt free of injuries this shift.  All questions addressed. Pt voices no concerns at this time. C[facility] orders maintained sitter at bedside( order end 4/22). Contact precautions maintained- MRSA to foot wound. Safety and comfort measures maintained during hourly rounding.

## 2022-04-20 NOTE — PROGRESS NOTES
Pharmacist Renal Dose Adjustment Note    Audrey Natarajan is a 51 y.o. female being treated with the medication pepcid    Patient Data:    Vital Signs (Most Recent):  Temp: 96.6 °F (35.9 °C) (04/20/22 0734)  Pulse: 82 (04/20/22 0851)  Resp: 18 (04/20/22 0851)  BP: (!) 91/55 (04/20/22 0734)  SpO2: 97 % (04/20/22 0851)   Vital Signs (72h Range):  Temp:  [96.6 °F (35.9 °C)-99.5 °F (37.5 °C)]   Pulse:  []   Resp:  [17-22]   BP: ()/(37-71)   SpO2:  [93 %-100 %]      Recent Labs   Lab 04/18/22  1246 04/19/22  0512 04/20/22  0501   CREATININE 0.8 1.0 2.4*     Serum creatinine: 2.4 mg/dL (H) 04/20/22 0501  Estimated creatinine clearance: 34.6 mL/min (A)    Medication:pepcid dose: 20mg frequency bid will be changed to medication:pepcid dose:20mg frequency:daily    Pharmacist's Name: Rudi Arredondo  Pharmacist's Extension: [phone]

## 2022-04-20 NOTE — PROGRESS NOTES
PSYCHIATRY INPATIENT PROGRESS NOTE  SUBSEQUENT HOSPITAL VISIT      4/20/2022 8:01 AM   Audrey Natarajan   1970   [id number]           DATE OF ADMISSION: 4/12/2022 11:28 PM    SITE: [facility]    CURRENT LEGAL STATUS: PEC/CEC      HISTORY    Per Initial History from Primary Team:   Audrey Natarajan is a 52 yo female with a pmh of DM2, bipolar 1 disorder, cellulitis, COPD, HTN, CAD, DVT/PE. She presented to the ED with c/o fevers x 1 day. She also has BLE swelling and pain and wounds to both feet, worsening x 2 weeks. She had fever up to 102F yesterday. She reports the wounds to her right foot are from dragging her feet while she was angry. Denies drainage to foot wounds. She has had blood clots in the past and has an IVC filter placed in 2012. She was sent here from [facility] where she was under CEC for psychosis. ED workup revealed BLE DVT on venous US, WBC 31, and Hgb 9.9. She received a dose of vancomycin while in the ED.   Interval History from Primary Team on 04/19/2022:  patient doing ok today, pending CT ab/pelv to evaluate hematoma;   - patient's leukocytosis continues to rise; ID and podiatry consulted concerning patient foot wound and appreciate recs  - will need inpatient psych once medically stable         Chief Complaint / Reason for Original Psychiatry Consult: Psychosis / Bipolar I Disorder; Patient from [facility] on PEC/CEC       Subjective / Interval Psychiatric History Today (04/20/2022) (with psychiatric ROS below):   Audrey Natarajan is a 51 y.o. female with a past medical history as noted above/below, and a past psychiatric history of Bipolar I Disorder, psychosis, depression, and ADHD, currently being treated by her inpatient primary team for a principle problem of acute DVTs of BLEs and wound infection.  Psychiatry was originally consulted as noted above.  The patient was seen and examined again this AM.  The chart was reviewed again this AM.  On examination  today, the patient was alert and oriented to person, place, city, state, month, year, and situation.  She was CAM-ICU negative for delirium.  She was irritable, angry, and cursing at me throughout the assessment due to her daughter not being able to visit last night.  She continues to appear with paranoia, racing thoughts, loosening of associations, pressured/rapid speech, and tangential / disorganized TP.  She endorses again having intermittent trouble with sleep overnight (unable to quantify despite multiple attempts).  She endorses a good appetite and is upset that she has been NPO for possible podiatry procedure.  She denies AH, VH, or TH (no RIS observed).  She denies any current/recent passive/active SI/HI.  She denies any adverse effects to her current medication regimen.  Regarding current medical/physical complaints, she again endorses improving diffuse MSK pain.  She denies any other medical complaints at this time.  NAD was observed during the examination.  Psychotherapy was again implemented with a focus on improving mood / anger / bibi / thought process and sleep.  See detailed psych ROS below.  See A/P below.          Psychiatric Review Of Systems - Currently, the patient is endorsing and/or denying the following:  (patient's endorsements are BOLDED below; if not BOLDED, then patient denied):     Endorses Symptoms of Depression: diminished mood, low motivation, loss of interest/anhedonia, irritability, diminished energy, change in sleep, change in appetite, diminished concentration or cognition or indecisiveness, PMA/R, excessive guilt or hopelessness or worthlessness, suicidal ideations     Endorses issues with Sleep: initiation, maintenance, early morning awakening with inability to return to sleep     Denies Suicidal/Homicidal ideations: active/passive ideations, organized plans, future intentions     Endorses Symptoms of psychosis: paranoia, hallucinations, delusions, disorganized thinking  (slightly more organized today), disorganized behavior or abnormal motor behavior, or negative symptoms (diminshed emotional expression, avolition, anhedonia, alogia, asociality)      Endorses Symptoms of bibi or hypomania: elevated, expansive, or irritable mood with increased energy or activity; with inflated self-esteem or grandiosity, decreased need for sleep, increased rate of speech, FOI or racing thoughts, distractibility, increased goal directed activity or PMA, risky/disinhibited behavior     Denies Symptoms of MALISSA: excessive anxiety/worry/fear, more days than not, about numerous issues, difficult to control, with restlessness, fatigue, poor concentration, irritability, muscle tension, sleep disturbance; causes functionally impairing distress      Denies Symptoms of Panic Disorder: recurrent panic attacks, precipitated or un-precipitated, source of worry and/or behavioral changes secondary; with or without agoraphobia     Denies Symptoms of PTSD: h/o trauma; re-experiencing/intrusive symptoms, avoidant behavior, negative alterations in cognition or mood, or hyperarousal symptoms; with or without dissociative symptoms      Denies Symptoms of OCD: obsessions or compulsions      Denies Symptoms of Eating Disorders: anorexia, bulimia or binging     Denies Substance Use: intoxication, withdrawal, tolerance, used in larger amounts or duration than intended, unsuccessful attempts to limit or quit, increased time engaging in or seeking out, cravings or strong desire to use, failure to fulfill obligations, negative consequences in social/interpersonal/occupational,/recreational areas, use in dangerous situations, medical or psychological consequences         PSYCHOTHERAPY ADD-ON +[id number]   30 (16-37*) minutes     Time: 18 minutes  Participants: Met with patient     Therapeutic Intervention Type: behavior modifying psychotherapy, supportive psychotherapy  Why chosen therapy is appropriate versus another modality:  relevant to diagnosis, patient responds to this modality, evidence based practice     Target symptoms: mood / anger / bibi / TERRI and insomnia   Primary focus: improving mood / anger / bibi / thought process and sleep  Psychotherapeutic techniques: supportive and psychodynamic techniques; psycho-education; deep breathing exercises; CBT; problem solving techniques and managing life stressors     Outcome monitoring methods: self-report, observation     Patient's response to intervention:  The patient's response to intervention is more resistant today / limited      Progress toward goals:  The patient's progress toward goals is fair / limited         ROS  General ROS: negative for - chills, fatigue, fever or night sweats  Ophthalmic ROS: negative for - blurry vision, double vision or eye pain  ENT ROS: negative for - sinus pain, headaches, sore throat or visual changes  Allergy and Immunology ROS: negative for - hives, itchy/watery eyes or nasal congestion  Hematological and Lymphatic ROS: negative for - bleeding problems, bruising, jaundice or pallor  Endocrine ROS: negative for - galactorrhea, hot flashes, mood swings, palpitations or temperature intolerance  Respiratory ROS: negative for - cough, hemoptysis, shortness of breath, tachypnea or wheezing  Cardiovascular ROS: negative for - chest pain, dyspnea on exertion, loss of consciousness, palpitations, rapid heart rate or shortness of breath  Gastrointestinal ROS: negative for - appetite loss, nausea, abdominal pain, blood in stools, change in bowel habits, constipation or diarrhea  Genito-Urinary ROS: negative for - incontinence, nocturia or pelvic pain  Musculoskeletal ROS: negative for - joint stiffness; positive for diffuse MSK pain / discomfort (improving)  Neurological ROS: negative for - behavioral changes, confusion, dizziness, memory loss, numbness/tingling or seizures  Dermatological ROS: negative for dry skin, hair changes, pruritus or rash; positive  "for color change / wound (improving)  Psychiatric ROS: see detailed psychiatric ROS above in subjective section       PAST MEDICAL & SURGICAL HISTORY   Past Medical History:   Diagnosis Date    ADHD (attention deficit hyperactivity disorder)     Arthritis     Asthma     Bipolar 1 disorder     Cataract     COPD (chronic obstructive pulmonary disease)     Coronary artery disease     A fib    Depression     bipolar manic depresson    Diabetes mellitus     DVT of lower extremity, bilateral July 2013    bilateral LE DVT. Estelita filter placed.     Encounter for blood transfusion     History of blood clots 1. Left Leg=2003; 2.Bilateral Groin=Blood Clots= 5 or 6/ 2013 & 7/2013; 3. LLL of Lung=7/2013;  4. Lt. Lower Leg=7/2013.     Pt. had 1st Blood Clot after [id number], & Last=2013. Estelita Filter= Rt.Lateral Neck.    HTN (hypertension) 6/6/2013    Pt states that she does not have hypertension    Hypercholesteremia     Irregular heartbeat     Neuromuscular disorder     neuropathy feet    PE (pulmonary embolism) July 2013     bilat LE DVT.     Restless leg syndrome      Past Surgical History:   Procedure Laterality Date    ABDOMINAL SURGERY  2010    gastric sleeve    BILATERAL OOPHORECTOMY Bilateral 1/12/2015    CHOLECYSTECTOMY      Green' s filter Right 7/4/2012    Right Neck & Tunneled Down.    HERNIA REPAIR      "[facility] [place] [facility] around th [place].", pt. states    LAPAROSCOPIC CHOLECYSTECTOMY N/A 9/10/2020    Procedure: CHOLECYSTECTOMY, LAPAROSCOPIC;  Surgeon: Montrell Gutierrez MD;  Location: [facility];  Service: General;  Laterality: N/A;  RN PREOP 9/9----COVID Negative  9/9    OVARIAN CYST REMOVAL  3/13/2014    MO REMOVAL OF OVARY/TUBE(S)      SPLENECTOMY, TOTAL  July 2003    TONSILLECTOMY      as a child    TYMPANOSTOMY TUBE PLACEMENT  1976    VEIN SURGERY  2003    Lt leg       FAMILY HISTORY   Family History   Problem Relation Age of Onset    Hypertension Father "     Diabetes Father     Heart disease Father     Cataracts Father     Diabetes Paternal Grandfather     Heart disease Paternal Grandfather     No Known Problems Mother     Ovarian cancer Maternal Grandmother          from this. ? age     No Known Problems Sister     No Known Problems Brother     No Known Problems Maternal Aunt     No Known Problems Maternal Uncle     No Known Problems Paternal Aunt     No Known Problems Paternal Uncle     No Known Problems Maternal Grandfather     Ovarian cancer Paternal Grandmother     Uterine cancer Neg Hx     Breast cancer Neg Hx     Colon cancer Neg Hx     Amblyopia Neg Hx     Blindness Neg Hx     Cancer Neg Hx     Glaucoma Neg Hx     Macular degeneration Neg Hx     Retinal detachment Neg Hx     Strabismus Neg Hx     Stroke Neg Hx     Thyroid disease Neg Hx        ALLERGIES   Review of patient's allergies indicates:   Allergen Reactions    Morphine Other (See Comments)     Patient had a psychotic episode after taking Morphine  Agitation, hallucinations    Penicillins Anaphylaxis     itching    Januvia [sitagliptin] Hives       CURRENT MEDICATION REGIMEN   Home Meds:   Prior to Admission medications    Medication Sig Start Date End Date Taking? Authorizing Provider   aspirin 81 MG Chew Take 1 tablet (81 mg total) by mouth once daily. 21 Yes Ifeoma Jacobs MD   DUPIXENT  mg/2 mL PnIj SMARTSI Milligram(s) SUB-Q Every 2 Weeks 22  Yes Historical Provider   famotidine (PEPCID) 20 MG tablet Take 20 mg by mouth 2 (two) times daily.   Yes Historical Provider   fluticasone-salmeterol diskus inhaler 250-50 mcg Inhale 1 puff into the lungs 2 (two) times daily. Controller 21 Yes Donaldo Pena MD   furosemide (LASIX) 20 MG tablet TAKE 1 TABLET(20 MG) BY MOUTH EVERY DAY 22  Yes Donaldo Pena MD   gabapentin (NEURONTIN) 300 MG capsule TAKE 2 CAPSULES(600 MG) BY MOUTH TWICE DAILY 22  Yes Donaldo VIDALES  MD Becky   hydrOXYzine (ATARAX) 50 MG tablet Take 50 mg by mouth 4 (four) times daily as needed. 3/17/22  Yes Historical Provider   ibuprofen (ADVIL,MOTRIN) 600 MG tablet TAKE 1 TABLET(600 MG) BY MOUTH EVERY 8 HOURS AS NEEDED FOR PAIN OR BACK PAIN 4/11/22  Yes Donaldo Pena MD   lisinopriL 10 MG tablet Take 1 tablet (10 mg total) by mouth once daily. 4/4/22  Yes Donaldo Pena MD   loratadine (CLARITIN) 10 mg tablet Take 1 tablet (10 mg total) by mouth once daily. 12/30/21 12/30/22 Yes Lary Sweet DO   metFORMIN (GLUCOPHAGE) 1000 MG tablet TAKE 1 TABLET(1000 MG) BY MOUTH TWICE DAILY WITH MEALS 12/26/21  Yes Donaldo Pena MD   metoprolol tartrate (LOPRESSOR) 50 MG tablet TAKE 1 TABLET(50 MG) BY MOUTH TWICE DAILY 12/26/21  Yes Donaldo Pena MD   pravastatin (PRAVACHOL) 40 MG tablet TAKE 1 TABLET(40 MG) BY MOUTH EVERY EVENING 12/26/21  Yes Donaldo Pena MD   acetaminophen (TYLENOL) 500 MG tablet Take 2 tablets (1,000 mg total) by mouth every 6 (six) hours as needed for Pain. 7/13/21   Lary Sweet DO   albuterol (PROVENTIL/VENTOLIN HFA) 90 mcg/actuation inhaler Inhale 2 puffs into the lungs every 6 (six) hours as needed for Wheezing. Use with spacer  Dispense with 1 spacer 12/30/21 12/30/22  Lary Sweet DO   albuterol-ipratropium (DUO-NEB) 2.5 mg-0.5 mg/3 mL nebulizer solution Take 3 mLs by nebulization every 6 (six) hours as needed for Wheezing or Shortness of Breath. Rescue 7/19/21 7/19/22  Donaldo Pena MD   aluminum-magnesium hydroxide-simethicone (MAALOX) 200-200-20 mg/5 mL Susp Take 30 mLs by mouth every 6 (six) hours as needed (indigestion). 2/20/21 2/20/22  Ifeoma Jacobs MD   blood sugar diagnostic Strp To check BG two  times daily, to use with insurance preferred meter 9/30/21   Donaldo Pena MD   blood-glucose meter kit To check BG once daily, to use with insurance preferred meter 2/20/21 12/8/23  Ifeoma Jacobs MD   blood-glucose meter kit To check BG two times daily, to use  with insurance preferred meter 9/30/21 9/30/22  Donaldo Pena MD   dicyclomine (BENTYL) 20 mg tablet Take 1 tablet (20 mg total) by mouth every 6 (six) hours. 3/12/22   Tu Arredondo PA-C   divalproex (DEPAKOTE) 500 MG TbEC Take 1 tablet (500 mg total) by mouth once daily. 4/19/22 4/19/23  Diego Ridley MD   divalproex (DEPAKOTE) 500 MG TbEC Take 2 tablets (1,000 mg total) by mouth every evening. 4/18/22 4/18/23  Diego Ridley MD   enoxaparin (LOVENOX) 100 mg/mL Syrg Inject 1 mL (100 mg total) into the skin every 12 (twelve) hours. 4/18/22   Diego Ridley MD   EPITOL 200 mg tablet  2/26/21   Historical Provider   fluticasone propionate (FLONASE) 50 mcg/actuation nasal spray 1 spray (50 mcg total) by Each Nostril route 2 (two) times daily. 12/30/21   Lary Sweet DO   folic acid (FOLVITE) 1 MG tablet Take 1 tablet (1 mg total) by mouth once daily. 7/19/21 10/17/21  Donaldo Pena MD   hydrOXYzine pamoate (VISTARIL) 50 MG Cap Take 1 capsule (50 mg total) by mouth every 8 (eight) hours as needed (insomnia). 4/18/22   Diego Ridley MD   lancets Misc To check BG two times daily, to use with insurance preferred meter 9/30/21   Donaldo Pena MD   multivitamin Tab Take 1 tablet by mouth once daily. 2/20/21   Ifeoma Jacobs MD   [facility] BIGG LG MASK Spcr Inhale into the lungs. 12/30/21   Historical Provider   pantoprazole (PROTONIX) 40 MG tablet Take 1 tablet (40 mg total) by mouth once daily. 7/13/21   Aiden Oleary MD   polyvinyl alcohol, artificial tears, (LIQUIFILM TEARS) 1.4 % ophthalmic solution Place 1 drop into both eyes 4 (four) times daily. 2/20/21   Ifeoma Jacobs MD   risperiDONE (RISPERDAL M-TABS) 2 MG disintegrating tablet Take 1 tablet (2 mg total) by mouth 2 (two) times daily. 4/18/22 4/18/23  Diego Ridley MD   senna (SENOKOT) 8.6 mg tablet Take 1 tablet by mouth 2 (two) times a day. 2/20/21   Ifeoma Jacobs MD   sulfamethoxazole-trimethoprim 800-160mg (BACTRIM DS) 800-160 mg Tab  Take 2 tablets by mouth 2 (two) times daily. for 10 days 4/18/22 4/28/22  Diego Ridley MD   TRUE METRIX GLUCOSE METER [facility] [place] B[place]OD SUGAR TWICE DAILY 11/4/21   Donaldo Pena MD   diclofenac sodium (VOLTAREN) 1 % Gel Apply 2 g topically 4 (four) times daily as needed (Apply to painful area up to 4 times a day as needed for pain). Apply to painful area 4 times a day as needed for pain 10/1/21 4/18/22  Corie Iqbal NP   nystatin (NYSTOP) powder APPLY TOPICALLY TO AXILLA AND BREAST FOLDS TWICE DAILY 9/30/21 4/18/22  Donaldo Pena MD   QUEtiapine (SEROQUEL) 200 MG Tab Take 1 tablet (200 mg total) by mouth before breakfast. 2/21/21 4/18/22  Ifeoma Jacobs MD       Scheduled Meds:    apixaban  10 mg Oral BID    aspirin  81 mg Oral Daily    divalproex  1,250 mg Oral QHS    divalproex  500 mg Oral Daily    famotidine  20 mg Oral BID    ferrous sulfate  1 tablet Oral Daily    fluticasone furoate-vilanteroL  1 puff Inhalation Daily    gabapentin  300 mg Oral BID    metroNIDAZOLE  500 mg Oral Q8H    miconazole NITRATE 2 %   Topical (Top) BID    mupirocin   Nasal BID    nicotine  1 patch Transdermal Daily    pravastatin  40 mg Oral QHS    risperiDONE  2 mg Oral Daily    risperiDONE  3 mg Oral Nightly    sodium chloride 0.9%  1,000 mL Intravenous Once    vancomycin (VANCOCIN) IVPB  2,000 mg Intravenous Once    [START ON 4/21/2022] vancomycin (VANCOCIN) IVPB  1,250 mg Intravenous Q24H      PRN Meds: acetaminophen, albuterol-ipratropium, dextrose 10%, dextrose 10%, glucagon (human recombinant), glucose, glucose, HYDROcodone-acetaminophen, hydrocortisone, hydrOXYzine pamoate, insulin aspart U-100, melatonin, naloxone, ondansetron, senna-docusate 8.6-50 mg, sodium chloride 0.9%, Pharmacy to dose Vancomycin consult **AND** vancomycin - pharmacy to dose   Psychotherapeutics (From admission, onward)            Start     Stop Route Frequency Ordered    04/20/22 0900  risperiDONE disintegrating tablet 2 mg          -- Oral Daily 04/19/22 1439    04/19/22 2100  risperiDONE disintegrating tablet 3 mg         -- Oral Nightly 04/19/22 1439          LABORATORY DATA   Recent Results (from the past 72 hour(s))   POCT glucose    Collection Time: 04/17/22 12:10 PM   Result Value Ref Range    POCT Glucose 229 (H) 70 - 110 mg/dL   POCT glucose    Collection Time: 04/17/22  5:06 PM   Result Value Ref Range    POCT Glucose 165 (H) 70 - 110 mg/dL   POCT glucose    Collection Time: 04/17/22  7:04 PM   Result Value Ref Range    POCT Glucose 213 (H) 70 - 110 mg/dL   POCT glucose    Collection Time: 04/18/22  4:10 AM   Result Value Ref Range    POCT Glucose 234 (H) 70 - 110 mg/dL   POCT glucose    Collection Time: 04/18/22 11:13 AM   Result Value Ref Range    POCT Glucose 245 (H) 70 - 110 mg/dL   CBC Auto Differential    Collection Time: 04/18/22 12:46 PM   Result Value Ref Range    WBC 16.31 (H) 3.90 - 12.70 K/uL    RBC 3.82 (L) 4.00 - 5.40 M/uL    Hemoglobin 10.5 (L) 12.0 - 16.0 g/dL    Hematocrit 31.9 (L) 37.0 - 48.5 %    MCV 84 82 - 98 fL    MCH 27.5 27.0 - 31.0 pg    MCHC 32.9 32.0 - 36.0 g/dL    RDW 15.4 (H) 11.5 - 14.5 %    Platelets 683 (H) 150 - 450 K/uL    MPV 10.0 9.2 - 12.9 fL    Immature Granulocytes 4.5 (H) 0.0 - 0.5 %    Gran # (ANC) 8.7 (H) 1.8 - 7.7 K/uL    Immature Grans (Abs) 0.73 (H) 0.00 - 0.04 K/uL    Lymph # 4.9 (H) 1.0 - 4.8 K/uL    Mono # 1.4 (H) 0.3 - 1.0 K/uL    Eos # 0.5 0.0 - 0.5 K/uL    Baso # 0.12 0.00 - 0.20 K/uL    nRBC 0 0 /100 WBC    Gran % 53.3 38.0 - 73.0 %    Lymph % 29.8 18.0 - 48.0 %    Mono % 8.4 4.0 - 15.0 %    Eosinophil % 3.3 0.0 - 8.0 %    Basophil % 0.7 0.0 - 1.9 %    Aniso Slight     Poik Slight     Hypo Occasional     Ovalocytes Occasional     Target Cells Occasional     Comfort Cells Occasional     Differential Method Automated    Sedimentation rate    Collection Time: 04/18/22 12:46 PM   Result Value Ref Range    Sed Rate 96 (H) 0 - 20 mm/Hr   C-reactive protein    Collection Time: 04/18/22  12:46 PM   Result Value Ref Range    CRP 56.3 (H) 0.0 - 8.2 mg/L   Basic Metabolic Panel    Collection Time: 04/18/22 12:46 PM   Result Value Ref Range    Sodium 138 136 - 145 mmol/L    Potassium 4.4 3.5 - 5.1 mmol/L    Chloride 97 95 - 110 mmol/L    CO2 27 23 - 29 mmol/L    Glucose 237 (H) 70 - 110 mg/dL    BUN 19 6 - 20 mg/dL    Creatinine 0.8 0.5 - 1.4 mg/dL    Calcium 10.5 8.7 - 10.5 mg/dL    Anion Gap 14 8 - 16 mmol/L    eGFR if African American >60 >60 mL/min/1.73 m^2    eGFR if non African American >60 >60 mL/min/1.73 m^2   COVID-19 Rapid Screening    Collection Time: 04/18/22  1:19 PM   Result Value Ref Range    SARS-CoV-2 RNA, Amplification, Qual Negative Negative   POCT glucose    Collection Time: 04/18/22  5:35 PM   Result Value Ref Range    POCT Glucose 266 (H) 70 - 110 mg/dL   POCT glucose    Collection Time: 04/18/22  7:43 PM   Result Value Ref Range    POCT Glucose 189 (H) 70 - 110 mg/dL   Lactic acid, plasma    Collection Time: 04/18/22  7:46 PM   Result Value Ref Range    Lactate (Lactic Acid) 1.3 0.5 - 2.2 mmol/L   Blood culture    Collection Time: 04/18/22  7:46 PM    Specimen: Antecubital, Right Arm; Blood   Result Value Ref Range    Blood Culture, Routine No Growth to date     Blood Culture, Routine No Growth to date    Blood culture    Collection Time: 04/18/22  7:46 PM    Specimen: Antecubital, Left Arm; Blood   Result Value Ref Range    Blood Culture, Routine No Growth to date     Blood Culture, Routine No Growth to date    POCT glucose    Collection Time: 04/19/22  5:09 AM   Result Value Ref Range    POCT Glucose 246 (H) 70 - 110 mg/dL   CBC Auto Differential    Collection Time: 04/19/22  5:12 AM   Result Value Ref Range    WBC 18.05 (H) 3.90 - 12.70 K/uL    RBC 3.91 (L) 4.00 - 5.40 M/uL    Hemoglobin 10.7 (L) 12.0 - 16.0 g/dL    Hematocrit 35.4 (L) 37.0 - 48.5 %    MCV 91 82 - 98 fL    MCH 27.4 27.0 - 31.0 pg    MCHC 30.2 (L) 32.0 - 36.0 g/dL    RDW 16.1 (H) 11.5 - 14.5 %    Platelets 536  (H) 150 - 450 K/uL    MPV 10.5 9.2 - 12.9 fL    Immature Granulocytes CANCELED 0.0 - 0.5 %    Immature Grans (Abs) CANCELED 0.00 - 0.04 K/uL    nRBC 0 0 /100 WBC    Gran % 78.0 (H) 38.0 - 73.0 %    Lymph % 15.0 (L) 18.0 - 48.0 %    Mono % 1.0 (L) 4.0 - 15.0 %    Eosinophil % 1.0 0.0 - 8.0 %    Basophil % 2.0 (H) 0.0 - 1.9 %    Bands 1.0 %    Myelocytes 2.0 %    Platelet Estimate Increased (A)     Aniso Slight     Poik Slight     Hypo Occasional     Ovalocytes Occasional     Acanthocytes Present     Differential Method Manual    Comprehensive metabolic panel    Collection Time: 04/19/22  5:12 AM   Result Value Ref Range    Sodium 134 (L) 136 - 145 mmol/L    Potassium 5.5 (H) 3.5 - 5.1 mmol/L    Chloride 97 95 - 110 mmol/L    CO2 23 23 - 29 mmol/L    Glucose 242 (H) 70 - 110 mg/dL    BUN 29 (H) 6 - 20 mg/dL    Creatinine 1.0 0.5 - 1.4 mg/dL    Calcium 10.5 8.7 - 10.5 mg/dL    Total Protein 7.9 6.0 - 8.4 g/dL    Albumin 3.4 (L) 3.5 - 5.2 g/dL    Total Bilirubin 0.2 0.1 - 1.0 mg/dL    Alkaline Phosphatase 87 55 - 135 U/L    AST 14 10 - 40 U/L    ALT 25 10 - 44 U/L    Anion Gap 14 8 - 16 mmol/L    eGFR if African American >60 >60 mL/min/1.73 m^2    eGFR if non African American >60 >60 mL/min/1.73 m^2   Protime-INR    Collection Time: 04/19/22  6:40 AM   Result Value Ref Range    Prothrombin Time 10.3 9.0 - 12.5 sec    INR 1.0 0.8 - 1.2   POCT glucose    Collection Time: 04/19/22 11:26 AM   Result Value Ref Range    POCT Glucose 249 (H) 70 - 110 mg/dL   POCT glucose    Collection Time: 04/19/22  4:25 PM   Result Value Ref Range    POCT Glucose 170 (H) 70 - 110 mg/dL   Potassium    Collection Time: 04/19/22  5:45 PM   Result Value Ref Range    Potassium 4.6 3.5 - 5.1 mmol/L   Comprehensive metabolic panel    Collection Time: 04/20/22  5:01 AM   Result Value Ref Range    Sodium 130 (L) 136 - 145 mmol/L    Potassium 5.4 (H) 3.5 - 5.1 mmol/L    Chloride 99 95 - 110 mmol/L    CO2 15 (L) 23 - 29 mmol/L    Glucose 160 (H) 70 -  "110 mg/dL    BUN 49 (H) 6 - 20 mg/dL    Creatinine 2.4 (H) 0.5 - 1.4 mg/dL    Calcium 10.2 8.7 - 10.5 mg/dL    Total Protein 6.3 6.0 - 8.4 g/dL    Albumin 2.9 (L) 3.5 - 5.2 g/dL    Total Bilirubin 0.2 0.1 - 1.0 mg/dL    Alkaline Phosphatase 61 55 - 135 U/L    AST 22 10 - 40 U/L    ALT 18 10 - 44 U/L    Anion Gap 16 8 - 16 mmol/L    eGFR if African American 27 (A) >60 mL/min/1.73 m^2    eGFR if non African American 23 (A) >60 mL/min/1.73 m^2   POCT glucose    Collection Time: 04/20/22  5:44 AM   Result Value Ref Range    POCT Glucose 182 (H) 70 - 110 mg/dL      Lab Results   Component Value Date    VALPROATE 64.3 04/16/2022    CBMZ 3.6 (L) 01/26/2021         EXAMINATION    VITALS   Vitals:    04/20/22 0314 04/20/22 0545 04/20/22 0734 04/20/22 0851   BP:  (!) 105/56 (!) 91/55    BP Location:  Left arm Left arm    Patient Position:  Sitting Sitting    Pulse:  109 (!) 119 82   Resp:  17 20 18   Temp:   96.6 °F (35.9 °C)    TempSrc:   Oral    SpO2: 95% 100% 99% 97%   Weight:       Height:            CONSTITUTIONAL  General Appearance: NAD, unremarkable, age appropriate, disheveled, seated in chair, overweight     MUSCULOSKELETAL  Muscle Strength and Tone: WNL  Abnormal Involuntary Movements: none observed   Gait and Station: WNL; non-ataxic      PSYCHIATRIC   Behavior/Cooperation:  less cooperative, restless and fidgety , eye contact intermittent   Speech:  normal tone, normal pitch, loud volume, rapid/pressured   Language: grossly intact, able to name, able to repeat with spontaneous speech  Mood: "I'm not good"  Affect:  Labile ; Elevated ; Not Congruent with Endorsed Mood   Associations: intermittent TERRI  Thought Process: Linear with intermittent tangential / TERRI ; disorganization   Thought Content: + paranoia ; denies SI, HI, AH, VH, TH, delusions (no RIS observed)  Sensorium:  Awake  Alert and Oriented: to person, place, situation, month of year, year  Memory: 3/3 immediate, 2/3 at 5 minutes               Recent: " Intact; able to report recent events              Remote: Intact; Named 4/4 past presidents   Attention/concentration: Fair.  Requiring frequent redirection. Appropriate for age/education. Able to spell w-o-r-l-d & d-l-r-o-w.   Similarities: Intact (difference between apple and orange?)  Abstract reasoning: Limited   Insight: Limited  Judgment: Limited     CAM ICU Delirium Assessment - NEGATIVE        Is the patient aware of the biomedical complications associated with substance abuse and mental illness? yes           MEDICAL DECISION MAKING     ASSESSMENT      Bipolar I Disorder (currently severe with mixed s/s of depression and bibi)   Unspecified Insomnia   Hx of ADHD      RECOMMENDATIONS       - Continue PEC/CEC for grave disability 2/2 mental illness at this time.  Patient's PEC/CEC set to  on 2022.  Will proceed with filing CHANCE petition paperwork with hospital  given that patient continues to appear gravely disabled due to mental illness at this time (will have court paperwork scanned into chart once received from the hospital ).       - Once medically cleared / stable, seek transfer back to [facility] (or another inpatient psychiatric facility; patient may need Med-Psych placement) for continued mental health treatment / stabilization.     - Continue Risperdal 2 mg PO QAM & 3 mg PO QHS and Depakote  mg PO QAM and 1250 mg PO QHS for Bipolar I Disorder and insomnia (discussed risks/benefits/alt vs no treatment with patient)     - Continue Vistaril 50 mg PO TID (change to scheduled) for anxiety and/or insomnia (discussed risks/benefits/alt vs no treatment with patient)     - HOLD previously outpt prescribed Vyvanse (discussed risks/benefits/alt vs no treatment with patient)    - Can use Zyprexa 10 mg PO/IM q8 hours PRN for non-redirectable psychotic / manic agitation (discussed risks/benefits/alt vs no treatment with patient)     - Psychotherapy was again performed with  patient as noted above with a focus on improving mood / anger / bibi / thought process and sleep.     - Patient's most recent labs, imaging, and EKG were reviewed again today ; VPA level on 04/16/2022 was 64.3 ; monitor sodium status      - Continue suicide / violence precautions and continue to monitor the patient with a sitter while on a PEC / CEC / CHANCE filed status.       - Thank you for this consult ; our team will continue to follow patient         Total time spent with patient and/or managing/coordinating patient's care today (excluding the time spent on psychotherapy): 40 minutes   Time spent on psychotherapy today (as noted above): 18 minutes   Total time for encounter today including psychotherapy: 58 minutes      More than 50% of the time was spent counseling/coordinating care.     Consulting clinician was informed of the encounter and consult note.      STAFF:  Magdiel Del Real MD  [facility]  4/20/2022

## 2022-04-20 NOTE — PLAN OF CARE
chart reviewed - case discussed in am MD soares    remains under PEC/CEC -  per Psychiatry note today:       Continue PEC/CEC for grave disability 2/2 mental illness at this time.  Patient's PEC/CEC set to  on 2022.  Will proceed with filing CHANCE petition paperwork with hospital  given that patient continues to appear gravely disabled due to mental illness at this time (will have court paperwork scanned into chart once received from the hospital ).    pt with elevated WBC, for CT today to check abd hematoma    admit dx?  bilat leg pain/cellulitis/chest pain       sister: Anitra Cain --  [phone]     22 1418   Post-Acute Status   Post-Acute Authorization Placement  (remains with PEC/CEC)

## 2022-04-20 NOTE — PLAN OF CARE
Recommendation:   1. Continue current diabetic 1500 kcal diet as tolerated.  2. Addition of Optisource 1x/day to supplement protein needs.   3. Addition of Khurram BID to promote wound healing.   4. Monitor weight/labs.   5. RD to follow and monitor intake    Goals:   Pt intake >/= 75% EEN/EPN by RD follow up    Nutrition Goal Status: progressing towards goal  Communication of RD Recs: other (comment) (POC)      Problem: Impaired Wound Healing  Goal: Optimal Wound Healing  Outcome: Ongoing, Progressing

## 2022-04-20 NOTE — ASSESSMENT & PLAN NOTE
B/l foot ulcers  - Podiatry consulted,  - Xray in feet bilaterally, findings consistent with Charcot joint of left foot, no acute abnormality   - MRI ordered edema noted as well as charcot changes of left foot without evidence of osteomyelitis  - [facility] ulcer debrided with deep cultures taken.   - Site dressed with xeroform and kerlix. Nursing orders in for daily dressing changes  - She is NWB to [facility] due to ulcer and history of charcot foot.  - Will continue to follow  - Cont clindamycin - culture growing Staph aureus--sensitive to clinda.    4/18- cultures grew out MRSA and anerobic bacteria; patient is septic today and rising WBC; switching clinda to bactrim and adding flagyl; will have podiatry re-evaluate in AM and have ID consulted for abx regimen     4/20- leukocytosis improving today ; started on vanc and on flagyl; follow up ID and podiatry recs

## 2022-04-20 NOTE — ASSESSMENT & PLAN NOTE
Plan  -No surgical intervention planned at this time. Still little concern for deep infection in either foot based on clinical exam today. Bilateral foot X rays ordered to compare to priors. Continue to rule out other sources of infection.   -Antibiotic plan per ID.  -Appreciate psychiatry recs.   -Dressing changes by nursing.  -Patient noncompliant with prior LLE NWB orders. Recommend limiting WB as much as possible and using left surgical boot and right surgical shoe during any weightbearing activity. Ordered left surgical boot from DME and reordered right surgical shoe from nursing.   -Podiatry will follow. Will need outpatient followup with podiatry (Dr De Los Santos) on discharge.

## 2022-04-20 NOTE — PLAN OF CARE
Plan of care reviewed, pt verbalizes understanding. Patient is alert & Oriented.   Due medications given.  PRN pain medication given. Blood Glucose monitored, insulin administered per orders.  Patient safety maintained, bed in lowest position, call bell within patient reach.  Will continue to monitor.

## 2022-04-20 NOTE — PROGRESS NOTES
[facility] Medicine  Telemedicine Progress Note    Patient Name: Audrey Natarajan  MRN: [id number]  Patient Class: IP- Inpatient   Admission Date: 4/12/2022  Length of Stay: 2 days  Attending Physician: Aniya Mcintosh MD  Primary Care Provider: Donaldo Pena MD          Subjective:     Principal Problem:Diabetic foot ulcer associated with type 2 diabetes mellitus        HPI:  Audrey Natarajan is a 50 yo female with a pmh of DM2, bipolar 1 disorder, cellulitis, COPD, HTN, CAD, DVT/PE. She presented to the ED with c/o fevers x 1 day. She also has BLE swelling and pain and wounds to both feet, worsening x 2 weeks. She had fever up to 102F yesterday. She reports the wounds to her right foot are from dragging her feet while she was angry. Denies drainage to foot wounds. She has had blood clots in the past and has an IVC filter placed in 2012. She was sent here from [facility] where she was under CEC for psychosis. ED workup revealed BLE DVT on venous US, WBC 31, and Hgb 9.9. She received a dose of vancomycin while in the ED.       Overview/Hospital Course:  Admitted for diabetic foot infection, cellulitis, and DVT.  Pt with h/o DVT/PE, hypercoagulable state, IVC filter in place. Therapeutic lovenox initiated.  Podiatry consulted for diabetic foot ulcer, debrided on 4/13 with cultures obtained - growing Staph aureus so far. On IV Clindamycin. Psych consulted for medication evaluation, pt with CEC from behavioral health facility.       Interval History: patient is very agitated today and verbally abusing nursing staff, psych following and adjusting medications and needs continued CEC and inpatient psych once medically stable    - wound cultures growing MRSA, starting on vanc as well as growing anerobes on flagyl; leukocytosis improving today; follow up ID and podiatry recs    - patient's Cr went to 2.4 from 1.0; stopping lasix; giving 1L NS bolus; getting urine studies and  renal U/S and nephrology has been consulted   - CT ab/pelv wo shows panniculitis/cellulitis of lower abdominal wall    - will be placed back onto inpatient service today     Review of Systems   Constitutional:  Negative for activity change and fever.   Respiratory:  Negative for cough and shortness of breath.    Gastrointestinal:  Negative for abdominal pain and nausea.   Hematological:  Bruises/bleeds easily.   Psychiatric/Behavioral:  Positive for agitation and behavioral problems.    Objective:     Vital Signs (Most Recent):  Temp: 97.8 °F (36.6 °C) (04/20/22 1604)  Pulse: (!) 120 (04/20/22 1604)  Resp: 20 (04/20/22 1604)  BP: (!) 121/56 (04/20/22 1146)  SpO2: 98 % (04/20/22 1604)   Vital Signs (24h Range):  Temp:  [96.4 °F (35.8 °C)-97.8 °F (36.6 °C)] 97.8 °F (36.6 °C)  Pulse:  [] 120  Resp:  [17-20] 20  SpO2:  [95 %-100 %] 98 %  BP: ()/(50-66) 121/56     Weight: 107.8 kg (237 lb 10.5 oz)  Body mass index is 39.55 kg/m².    Intake/Output Summary (Last 24 hours) at 4/20/2022 1632  Last data filed at 4/20/2022 1412  Gross per 24 hour   Intake 1480 ml   Output --   Net 1480 ml        Physical Exam  Vitals and nursing note reviewed.   Constitutional:       General: She is not in acute distress.     Appearance: She is ill-appearing.   Pulmonary:      Effort: Pulmonary effort is normal.      Breath sounds: No wheezing.   Abdominal:      General: Abdomen is flat. There is no distension.      Comments: Abdominal hematoma   Musculoskeletal:         General: No swelling.      Left lower leg: No edema.   Skin:     Coloration: Skin is not jaundiced.      Findings: No rash.   Neurological:      General: No focal deficit present.      Mental Status: She is alert and oriented to person, place, and time.   Psychiatric:         Mood and Affect: Mood normal.         Behavior: Behavior normal.       Significant Labs: All pertinent labs within the past 24 hours have been reviewed.    Significant Imaging: I have reviewed  all pertinent imaging results/findings within the past 24 hours.      Assessment/Plan:      * Diabetic foot ulcer associated with type 2 diabetes mellitus  B/l foot ulcers  - Podiatry consulted,  - Xray in feet bilaterally, findings consistent with Charcot joint of left foot, no acute abnormality   - MRI ordered edema noted as well as charcot changes of left foot without evidence of osteomyelitis  - LLE ulcer debrided with deep cultures taken.   - Site dressed with xeroform and kerlix. Nursing orders in for daily dressing changes  - She is NWB to E due to ulcer and history of charcot foot.  - Will continue to follow  - Cont clindamycin - culture growing Staph aureus--sensitive to clinda.    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  BLE venous US with thrombosis of bilateral posterior tibial veins    - Continue therapeutic lovenox      4/18- switching patient to eliquis today    ODESSA (acute kidney injury)  4/20- Cr jumped to 2.4 from 1.0 today; ATN versus pre-renal; lasix stopped; giving 1L NS bolus; urine studies and renal U/S ordered and nephrology consulted       Anemia  Denies bleeding, baseline 12-13, 9.9 on admit  Iron panel: Iron 13, TIBC 456, Sat Iron 3, Transferrin and Ferritin WNL    - Iron supplement  - Continue to monitor with daily CBC    SHAWN (obstructive sleep apnea)  Does not use CPAP    Psychosis  Bipolar 1 disorder    -Continue CEC from behavioral health  facility  -Consulted psych for eval and medication management  - Cont Depakote and risperidone      Bipolar 1 disorder  See psychosis    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  Resumed home lisinopril.      VTE Risk Mitigation (From admission, onward)         Ordered     apixaban tablet 10 mg  2 times daily         04/18/22 1859     IP VTE HIGH RISK PATIENT  Once         04/13/22 0246     Place sequential compression device  Until discontinued         04/13/22 0246                      I have assessed these finding virtually using telemed platform and with assistance of bedside nurse                 The attending portion of this evaluation, treatment, and documentation was performed per Diego Ridley MD via Telemedicine AudioVisual using the secure Tengion software platform with 2 way audio/video. The provider was located off-site and the patient is located in the hospital. The aforementioned video software was utilized to document the relevant history and physical exam    Diego Ridley MD  Department of Hospital Medicine   [place] - Telemetry

## 2022-04-21 PROBLEM — E87.5 HYPERKALEMIA: Status: ACTIVE | Noted: 2022-04-21

## 2022-04-21 PROBLEM — R59.0 LYMPHADENOPATHY, INGUINAL: Status: ACTIVE | Noted: 2022-04-21

## 2022-04-21 LAB
ALBUMIN SERPL BCP-MCNC: 3 G/DL (ref 3.5–5.2)
ALBUMIN SERPL BCP-MCNC: 3.6 G/DL (ref 3.5–5.2)
ALLENS TEST: ABNORMAL
ALLENS TEST: ABNORMAL
ANION GAP SERPL CALC-SCNC: 12 MMOL/L (ref 8–16)
ANION GAP SERPL CALC-SCNC: 16 MMOL/L (ref 8–16)
BASOPHILS # BLD AUTO: 0.1 K/UL (ref 0–0.2)
BASOPHILS NFR BLD: 0.6 % (ref 0–1.9)
BUN SERPL-MCNC: 41 MG/DL (ref 6–20)
BUN SERPL-MCNC: 48 MG/DL (ref 6–20)
CALCIUM SERPL-MCNC: 10.4 MG/DL (ref 8.7–10.5)
CALCIUM SERPL-MCNC: 11.4 MG/DL (ref 8.7–10.5)
CHLORIDE SERPL-SCNC: 95 MMOL/L (ref 95–110)
CHLORIDE SERPL-SCNC: 96 MMOL/L (ref 95–110)
CO2 SERPL-SCNC: 19 MMOL/L (ref 23–29)
CO2 SERPL-SCNC: 24 MMOL/L (ref 23–29)
CREAT SERPL-MCNC: 1 MG/DL (ref 0.5–1.4)
CREAT SERPL-MCNC: 1.3 MG/DL (ref 0.5–1.4)
DELSYS: ABNORMAL
DIFFERENTIAL METHOD: ABNORMAL
EOSINOPHIL # BLD AUTO: 0.3 K/UL (ref 0–0.5)
EOSINOPHIL NFR BLD: 1.9 % (ref 0–8)
ERYTHROCYTE [DISTWIDTH] IN BLOOD BY AUTOMATED COUNT: 15.9 % (ref 11.5–14.5)
EST. GFR  (AFRICAN AMERICAN): 56 ML/MIN/1.73 M^2
EST. GFR  (AFRICAN AMERICAN): >60 ML/MIN/1.73 M^2
EST. GFR  (NON AFRICAN AMERICAN): 48 ML/MIN/1.73 M^2
EST. GFR  (NON AFRICAN AMERICAN): >60 ML/MIN/1.73 M^2
GLUCOSE SERPL-MCNC: 208 MG/DL (ref 70–110)
GLUCOSE SERPL-MCNC: 258 MG/DL (ref 70–110)
HCO3 UR-SCNC: 27.3 MMOL/L (ref 24–28)
HCO3 UR-SCNC: 27.4 MMOL/L (ref 24–28)
HCT VFR BLD AUTO: 34.3 % (ref 37–48.5)
HCT VFR BLD CALC: 32 %PCV (ref 36–54)
HGB BLD-MCNC: 10.9 G/DL (ref 12–16)
HGB BLD-MCNC: 11 G/DL
IMM GRANULOCYTES # BLD AUTO: 0.49 K/UL (ref 0–0.04)
IMM GRANULOCYTES NFR BLD AUTO: 3.1 % (ref 0–0.5)
IRON SERPL-MCNC: 19 UG/DL (ref 30–160)
LYMPHOCYTES # BLD AUTO: 3 K/UL (ref 1–4.8)
LYMPHOCYTES NFR BLD: 19.3 % (ref 18–48)
MCH RBC QN AUTO: 27.4 PG (ref 27–31)
MCHC RBC AUTO-ENTMCNC: 31.8 G/DL (ref 32–36)
MCV RBC AUTO: 86 FL (ref 82–98)
MONOCYTES # BLD AUTO: 1.7 K/UL (ref 0.3–1)
MONOCYTES NFR BLD: 11.1 % (ref 4–15)
NEUTROPHILS # BLD AUTO: 10 K/UL (ref 1.8–7.7)
NEUTROPHILS NFR BLD: 64 % (ref 38–73)
NRBC BLD-RTO: 0 /100 WBC
PCO2 BLDA: 35.3 MMHG (ref 35–45)
PCO2 BLDA: 36.9 MMHG (ref 35–45)
PH SMN: 7.48 [PH] (ref 7.35–7.45)
PH SMN: 7.5 [PH] (ref 7.35–7.45)
PHOSPHATE SERPL-MCNC: 3 MG/DL (ref 2.7–4.5)
PHOSPHATE SERPL-MCNC: 3.9 MG/DL (ref 2.7–4.5)
PLATELET # BLD AUTO: 807 K/UL (ref 150–450)
PMV BLD AUTO: 11.1 FL (ref 9.2–12.9)
PO2 BLDA: 159 MMHG (ref 80–100)
PO2 BLDA: 167 MMHG (ref 80–100)
POC BE: 4 MMOL/L
POC BE: 4 MMOL/L
POC SATURATED O2: 100 % (ref 95–100)
POC SATURATED O2: 100 % (ref 95–100)
POC TCO2: 28 MMOL/L (ref 23–27)
POC TCO2: 28 MMOL/L (ref 23–27)
POCT GLUCOSE: 235 MG/DL (ref 70–110)
POCT GLUCOSE: 258 MG/DL (ref 70–110)
POCT GLUCOSE: 259 MG/DL (ref 70–110)
POCT GLUCOSE: 265 MG/DL (ref 70–110)
POCT GLUCOSE: 302 MG/DL (ref 70–110)
POTASSIUM BLD-SCNC: 5.4 MMOL/L (ref 3.5–5.1)
POTASSIUM SERPL-SCNC: 5.7 MMOL/L (ref 3.5–5.1)
POTASSIUM SERPL-SCNC: 6.7 MMOL/L (ref 3.5–5.1)
RBC # BLD AUTO: 3.98 M/UL (ref 4–5.4)
SAMPLE: ABNORMAL
SAMPLE: ABNORMAL
SATURATED IRON: 5 % (ref 20–50)
SITE: ABNORMAL
SITE: ABNORMAL
SODIUM BLD-SCNC: 130 MMOL/L (ref 136–145)
SODIUM SERPL-SCNC: 130 MMOL/L (ref 136–145)
SODIUM SERPL-SCNC: 132 MMOL/L (ref 136–145)
TOTAL IRON BINDING CAPACITY: 401 UG/DL (ref 250–450)
TRANSFERRIN SERPL-MCNC: 271 MG/DL (ref 200–375)
VANCOMYCIN TROUGH SERPL-MCNC: 5.3 UG/ML (ref 10–22)
WBC # BLD AUTO: 15.65 K/UL (ref 3.9–12.7)

## 2022-04-21 PROCEDURE — 90833 PSYTX W PT W E/M 30 MIN: CPT | Mod: AF,HB,, | Performed by: PSYCHIATRY & NEUROLOGY

## 2022-04-21 PROCEDURE — 25000003 PHARM REV CODE 250: Performed by: NURSE PRACTITIONER

## 2022-04-21 PROCEDURE — 99233 PR SUBSEQUENT HOSPITAL CARE,LEVL III: ICD-10-PCS | Mod: AF,HB,, | Performed by: PSYCHIATRY & NEUROLOGY

## 2022-04-21 PROCEDURE — 99900035 HC TECH TIME PER 15 MIN (STAT)

## 2022-04-21 PROCEDURE — 80202 ASSAY OF VANCOMYCIN: CPT | Performed by: HOSPITALIST

## 2022-04-21 PROCEDURE — 25000003 PHARM REV CODE 250: Performed by: HOSPITALIST

## 2022-04-21 PROCEDURE — 63600175 PHARM REV CODE 636 W HCPCS: Performed by: HOSPITALIST

## 2022-04-21 PROCEDURE — S0166 INJ OLANZAPINE 2.5MG: HCPCS | Performed by: NURSE PRACTITIONER

## 2022-04-21 PROCEDURE — 27000207 HC ISOLATION

## 2022-04-21 PROCEDURE — 25000003 PHARM REV CODE 250

## 2022-04-21 PROCEDURE — 90833 PR PSYCHOTHERAPY W/PATIENT W/E&M, 30 MIN (ADD ON): ICD-10-PCS | Mod: AF,HB,, | Performed by: PSYCHIATRY & NEUROLOGY

## 2022-04-21 PROCEDURE — 84466 ASSAY OF TRANSFERRIN: CPT

## 2022-04-21 PROCEDURE — 36600 WITHDRAWAL OF ARTERIAL BLOOD: CPT

## 2022-04-21 PROCEDURE — 80069 RENAL FUNCTION PANEL: CPT | Performed by: STUDENT IN AN ORGANIZED HEALTH CARE EDUCATION/TRAINING PROGRAM

## 2022-04-21 PROCEDURE — 82803 BLOOD GASES ANY COMBINATION: CPT

## 2022-04-21 PROCEDURE — 85025 COMPLETE CBC W/AUTO DIFF WBC: CPT | Performed by: HOSPITALIST

## 2022-04-21 PROCEDURE — 99233 SBSQ HOSP IP/OBS HIGH 50: CPT | Mod: AF,HB,, | Performed by: PSYCHIATRY & NEUROLOGY

## 2022-04-21 PROCEDURE — 25000003 PHARM REV CODE 250: Performed by: STUDENT IN AN ORGANIZED HEALTH CARE EDUCATION/TRAINING PROGRAM

## 2022-04-21 PROCEDURE — 99222 PR INITIAL HOSPITAL CARE,LEVL II: ICD-10-PCS | Mod: ,,, | Performed by: INTERNAL MEDICINE

## 2022-04-21 PROCEDURE — 80069 RENAL FUNCTION PANEL: CPT | Mod: 91 | Performed by: HOSPITALIST

## 2022-04-21 PROCEDURE — 25000003 PHARM REV CODE 250: Performed by: PSYCHIATRY & NEUROLOGY

## 2022-04-21 PROCEDURE — 94761 N-INVAS EAR/PLS OXIMETRY MLT: CPT

## 2022-04-21 PROCEDURE — 94640 AIRWAY INHALATION TREATMENT: CPT

## 2022-04-21 PROCEDURE — C9399 UNCLASSIFIED DRUGS OR BIOLOG: HCPCS

## 2022-04-21 PROCEDURE — 99222 1ST HOSP IP/OBS MODERATE 55: CPT | Mod: ,,, | Performed by: INTERNAL MEDICINE

## 2022-04-21 PROCEDURE — 36415 COLL VENOUS BLD VENIPUNCTURE: CPT | Performed by: HOSPITALIST

## 2022-04-21 PROCEDURE — 11000001 HC ACUTE MED/SURG PRIVATE ROOM

## 2022-04-21 RX ORDER — OLANZAPINE 10 MG/2ML
10 INJECTION, POWDER, FOR SOLUTION INTRAMUSCULAR ONCE AS NEEDED
Status: COMPLETED | OUTPATIENT
Start: 2022-04-21 | End: 2022-04-21

## 2022-04-21 RX ORDER — METOPROLOL TARTRATE 50 MG/1
50 TABLET ORAL DAILY
Status: DISCONTINUED | OUTPATIENT
Start: 2022-04-22 | End: 2022-04-22

## 2022-04-21 RX ADMIN — APIXABAN 10 MG: 5 TABLET, FILM COATED ORAL at 09:04

## 2022-04-21 RX ADMIN — INSULIN ASPART 6 UNITS: 100 INJECTION, SOLUTION INTRAVENOUS; SUBCUTANEOUS at 04:04

## 2022-04-21 RX ADMIN — METRONIDAZOLE 500 MG: 500 TABLET ORAL at 09:04

## 2022-04-21 RX ADMIN — MICONAZOLE NITRATE 2 % TOPICAL POWDER: at 09:04

## 2022-04-21 RX ADMIN — Medication 400 MG: at 09:04

## 2022-04-21 RX ADMIN — RISPERIDONE 3 MG: 1 TABLET, ORALLY DISINTEGRATING ORAL at 09:04

## 2022-04-21 RX ADMIN — FAMOTIDINE 20 MG: 20 TABLET ORAL at 09:04

## 2022-04-21 RX ADMIN — DIVALPROEX SODIUM 1250 MG: 250 TABLET, DELAYED RELEASE ORAL at 09:04

## 2022-04-21 RX ADMIN — DIVALPROEX SODIUM 500 MG: 250 TABLET, DELAYED RELEASE ORAL at 09:04

## 2022-04-21 RX ADMIN — OLANZAPINE 10 MG: 10 INJECTION, POWDER, FOR SOLUTION INTRAMUSCULAR at 10:04

## 2022-04-21 RX ADMIN — SODIUM ZIRCONIUM CYCLOSILICATE 10 G: 5 POWDER, FOR SUSPENSION ORAL at 04:04

## 2022-04-21 RX ADMIN — PRAVASTATIN SODIUM 40 MG: 40 TABLET ORAL at 09:04

## 2022-04-21 RX ADMIN — MUPIROCIN: 20 OINTMENT TOPICAL at 09:04

## 2022-04-21 RX ADMIN — HYDROXYZINE PAMOATE 50 MG: 25 CAPSULE ORAL at 09:04

## 2022-04-21 RX ADMIN — RISPERIDONE 2 MG: 1 TABLET, ORALLY DISINTEGRATING ORAL at 09:04

## 2022-04-21 RX ADMIN — SODIUM BICARBONATE 1300 MG: 650 TABLET ORAL at 09:04

## 2022-04-21 RX ADMIN — FERROUS SULFATE TAB EC 325 MG (65 MG FE EQUIVALENT) 1 EACH: 325 (65 FE) TABLET DELAYED RESPONSE at 09:04

## 2022-04-21 RX ADMIN — GABAPENTIN 300 MG: 300 CAPSULE ORAL at 09:04

## 2022-04-21 RX ADMIN — INSULIN ASPART 6 UNITS: 100 INJECTION, SOLUTION INTRAVENOUS; SUBCUTANEOUS at 01:04

## 2022-04-21 RX ADMIN — METRONIDAZOLE 500 MG: 500 TABLET ORAL at 02:04

## 2022-04-21 RX ADMIN — VANCOMYCIN HYDROCHLORIDE 1250 MG: 1.25 INJECTION, POWDER, LYOPHILIZED, FOR SOLUTION INTRAVENOUS at 03:04

## 2022-04-21 RX ADMIN — INSULIN ASPART 4 UNITS: 100 INJECTION, SOLUTION INTRAVENOUS; SUBCUTANEOUS at 09:04

## 2022-04-21 RX ADMIN — HYDROXYZINE PAMOATE 50 MG: 25 CAPSULE ORAL at 02:04

## 2022-04-21 RX ADMIN — INSULIN DETEMIR 10 UNITS: 100 INJECTION, SOLUTION SUBCUTANEOUS at 09:04

## 2022-04-21 RX ADMIN — FLUTICASONE FUROATE AND VILANTEROL TRIFENATATE 1 PUFF: 100; 25 POWDER RESPIRATORY (INHALATION) at 08:04

## 2022-04-21 RX ADMIN — METRONIDAZOLE 500 MG: 500 TABLET ORAL at 05:04

## 2022-04-21 RX ADMIN — ASPIRIN 81 MG CHEWABLE TABLET 81 MG: 81 TABLET CHEWABLE at 09:04

## 2022-04-21 NOTE — ASSESSMENT & PLAN NOTE
Developed during admission, increased to 5.4 and to 5.7    - Given [facility]  - Nephrology folowing  - Renal US completed to rule out obstruction

## 2022-04-21 NOTE — ASSESSMENT & PLAN NOTE
Bipolar 1 disorder    Per Psychiatry note,   - Patient is now in a Judicial Commitment Petition Filed Status (filed on 04/21/2022) due to continued grave disability 2/2 mental illness at this time (will have court paperwork scanned into chart once received from the hospital ).    - Once medically cleared / stable, seek transfer back to [facility] (or another inpatient psychiatric facility; patient may need Med-Psych placement) for continued mental health treatment / stabilization.  - Continue Risperdal 2 mg PO QAM & 3 mg PO QHS and Depakote ER 500 mg PO QAM and 1250 mg PO QHS for Bipolar I Disorder and insomnia   - Continue Vistaril 50 mg PO TID (change to scheduled) for anxiety and/or insomnia   - HOLD previously outpt prescribed Vyvanse  - Can use Zyprexa 10 mg PO/IM q8 hours PRN for non-redirectable psychotic / manic agitation

## 2022-04-21 NOTE — ASSESSMENT & PLAN NOTE
-nonspecific iliofemoral lymphadenopathy noted on imaging  -likely secondary to Panniculitis  -no further workup needed

## 2022-04-21 NOTE — PROGRESS NOTES
[facility] Medicine  Progress Note    Patient Name: Audrey Natarajan  MRN: [id number]  Patient Class: IP- Inpatient   Admission Date: 4/12/2022  Length of Stay: 3 days  Attending Physician: Aniya Mcintosh MD  Primary Care Provider: Donaldo Pena MD        Subjective:     Principal Problem:Diabetic foot ulcer associated with type 2 diabetes mellitus        HPI:  Audrey Natarajan is a 50 yo female with a pmh of DM2, bipolar 1 disorder, cellulitis, COPD, HTN, CAD, DVT/PE. She presented to the ED with c/o fevers x 1 day. She also has BLE swelling and pain and wounds to both feet, worsening x 2 weeks. She had fever up to 102F yesterday. She reports the wounds to her right foot are from dragging her feet while she was angry. Denies drainage to foot wounds. She has had blood clots in the past and has an IVC filter placed in 2012. She was sent here from [facility] where she was under [facility] for psychosis. ED workup revealed BLE DVT on venous US, WBC 31, and Hgb 9.9. She received a dose of vancomycin while in the ED.       Overview/Hospital Course:  Admitted for diabetic foot infection, cellulitis, and DVT. Patient is now in a Judicial Commitment Petition Filed Status (filed on 04/21/2022) due to continued grave disability 2/2 mental illness at this time (will have court paperwork scanned into chart once received from the hospital ). History of DVT/PE, hypercoagulable state, IVC filter in place. Therapeutic lovenox initiated, transitioned to eliquis. Podiatry consulted for diabetic foot ulcer, debrided on 4/13 with cultures obtained - growing MRSA. Started on IV Clindamycin, switched to bactrim with flagyl, with further discontinuation of bactrim and initiation of vancomycin. ID consulted to follow for antibiotic regimen. Psych consulted for medication evaluation, pt with CEC from behavioral health facility. Developed ODESSA with increase in Cr to 2.4 that has since stabiized as  well as hyperkalemia, nephrology consulted for assistance in management. Renal US showed minimally dilated central renal collecting system on the left and mildly elevated resistive indices, findings which may be seen in setting of medical renal disease. Heme/onc consulted given iliofemoral lymphadenopathy, possibly reactive or neoplastic identified on CT abd/pelvis.       Interval History: Tachycardic to 120s with other VSS. Now a Judicial Commitment Petition Filed Status (filed today) due to continued grave disability 2/2 mental illness at this time (will have court paperwork scanned into chart once received from the hospital ). On IV vancomycin. ID and Podiatry following left foot wound. Nephrology following ODESSA and hyperkalemia. Heme/Onc consulted. Psych following.     Review of Systems   Constitutional:  Negative for appetite change and fever.   HENT:  Negative for congestion, rhinorrhea and sore throat.    Eyes:  Negative for photophobia and visual disturbance.   Respiratory:  Negative for cough and shortness of breath.    Cardiovascular:  Positive for leg swelling. Negative for chest pain.   Gastrointestinal:  Negative for abdominal pain, constipation, diarrhea, nausea and vomiting.   Genitourinary:  Negative for dysuria and hematuria.   Musculoskeletal:  Positive for arthralgias.   Skin:  Positive for color change and wound.   Neurological:  Negative for speech difficulty, weakness and headaches.   Psychiatric/Behavioral:  Positive for agitation and behavioral problems. Negative for confusion and suicidal ideas. The patient is nervous/anxious.    Objective:     Vital Signs (Most Recent):  Temp: 97.3 °F (36.3 °C) (04/21/22 1155)  Pulse: 108 (04/21/22 1155)  Resp: 20 (04/21/22 1155)  BP: 131/61 (04/21/22 1155)  SpO2: 100 % (04/21/22 1155) Vital Signs (24h Range):  Temp:  [96.2 °F (35.7 °C)-98.7 °F (37.1 °C)] 97.3 °F (36.3 °C)  Pulse:  [] 108  Resp:  [17-20] 20  SpO2:  [92 %-100 %] 100 %  BP:  (127-171)/(57-71) 131/61     Weight: 107.8 kg (237 lb 10.5 oz)  Body mass index is 39.55 kg/m².    Intake/Output Summary (Last 24 hours) at 4/21/2022 1455  Last data filed at 4/21/2022 1347  Gross per 24 hour   Intake 738.79 ml   Output 3900 ml   Net -3161.21 ml      Physical Exam  Vitals and nursing note reviewed.   Constitutional:       General: She is not in acute distress.     Appearance: She is obese. She is not toxic-appearing.   HENT:      Head: Normocephalic and atraumatic.      Nose: Nose normal.      Mouth/Throat:      Mouth: Mucous membranes are moist.   Eyes:      Pupils: Pupils are equal, round, and reactive to light.   Cardiovascular:      Rate and Rhythm: Regular rhythm. Tachycardia present.      Pulses: Normal pulses.      Heart sounds: Normal heart sounds.   Pulmonary:      Effort: Pulmonary effort is normal.      Breath sounds: Normal breath sounds.   Abdominal:      General: Bowel sounds are normal.      Palpations: Abdomen is soft.   Musculoskeletal:         General: Tenderness present. Normal range of motion.      Cervical back: Normal range of motion.      Right lower leg: No edema.      Left lower leg: No edema.   Skin:     General: Skin is warm and dry.      Comments: Left leg in walking boot and both feet wrapped     Neurological:      Mental Status: She is alert and oriented to person, place, and time.   Psychiatric:         Behavior: Behavior normal.         Thought Content: Thought content normal.       Significant Labs: All pertinent labs within the past 24 hours have been reviewed.    BMP:   Recent Labs   Lab 04/21/22  1333   *   *   K 5.7*   CL 96   CO2 24   BUN 41*   CREATININE 1.0   CALCIUM 10.4     CBC:   Recent Labs   Lab 04/20/22  1312 04/21/22  0714 04/21/22  1431   WBC 17.68* 15.65*  --    HGB 10.5* 10.9*  --    HCT 32.4* 34.3* 32*   * 807*  --      CMP:   Recent Labs   Lab 04/20/22  0501 04/21/22  0714 04/21/22  1333   * 130* 132*   K 5.4* 6.7* 5.7*    CL 99 95 96   CO2 15* 19* 24   * 208* 258*   BUN 49* 48* 41*   CREATININE 2.4* 1.3 1.0   CALCIUM 10.2 11.4* 10.4   PROT 6.3  --   --    ALBUMIN 2.9* 3.6 3.0*   BILITOT 0.2  --   --    ALKPHOS 61  --   --    AST 22  --   --    ALT 18  --   --    ANIONGAP 16 16 12   EGFRNONAA 23* 48* >60       Significant Imaging: I have reviewed all pertinent imaging results/findings within the past 24 hours.      Assessment/Plan:      * Diabetic foot ulcer associated with type 2 diabetes mellitus  B/l foot ulcers  - Podiatry consulted,  - Xray in feet bilaterally, findings consistent with Charcot joint of left foot, no acute abnormality   - MRI ordered edema noted as well as charcot changes of left foot without evidence of osteomyelitis  - LLE ulcer debrided with deep cultures taken.   - Site dressed with xeroform and kerlix. Nursing orders in for daily dressing changes  - She is NWB to E due to ulcer and history of charcot foot.  - Will continue to follow  - Cont clindamycin - culture growing Staph aureus--sensitive to clinda, switched   - Cultures grew out MRSA and anerobic bacteria; patient is septic today and rising WBC; switched clinda to bactrim and adding flagyl;       - Leukocytosis improving today  - Continue vancomycin and flagyl  - Follow up on ID and podiatry recs    Hyperkalemia  Developed during admission, increased to 5.4 and to 5.7    - Given [facility] folowing  - Renal US completed to rule out obstruction    Panniculitis  Identified on CT abdomen pelvis, no underlying hematoma or abscess      ODESSA (acute kidney injury)  No history of CKD  Creatinine baseline around 0.6-0.7, developed during admission likely secondary to bactrim use, increased to 2.4 and trended down to 1.0  US kidney showed renal US showed minimally dilated central renal collecting system on the left and mildly elevated resistive indices, findings which may be seen in setting of medical renal disease    - Daily Renal Function  Panel  - Check Vanc levels before each dose  - Need strict I&Os  - Hold off on serological work up for now,  get basic pending labs today   - No Indication for RRT at this time, K+ acceptable, No Uremia symptoms.  - Avoid Hypotension.  - Renally dose all meds  - Please avoid nephrotoxins, including NSAIDs, aminoglycosides, IV contrast (unless absolutely necessary), gadolinium, fleets and other phosphorous-based laxatives. Caution with antibiotics    Sepsis due to methicillin resistant Staphylococcus aureus (MRSA)  - see principle problem    Anemia  Denies bleeding, baseline 12-13, 9.9 on admit  Iron panel: Iron 13, TIBC 456, Sat Iron 3, Transferrin and Ferritin WNL    - Iron supplement  - Continue to monitor with daily CBC    Acute deep vein thrombosis (DVT) of both lower extremities  Hx of DVT/PE, hypercoagulable state, IVC filter in place  BLE venous US with thrombosis of bilateral posterior tibial veins  Initially started on therapeutic lovenox    - Continue Eliquis    SHAWN (obstructive sleep apnea)  Does not use CPAP    Psychosis  Bipolar 1 disorder    Per Psychiatry note,   - Patient is now in a Judicial Commitment Petition Filed Status (filed on 04/21/2022) due to continued grave disability 2/2 mental illness at this time (will have court paperwork scanned into chart once received from the hospital ).    - Once medically cleared / stable, seek transfer back to [facility] (or another inpatient psychiatric facility; patient may need Med-Psych placement) for continued mental health treatment / stabilization.  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  Latest blood pressure and vitals reviewed-   Temp:  [96.2 °F (35.7 °C)-98.7 °F (37.1 °C)]   Pulse:  []   Resp:  [17-20]   BP: (127-171)/(57-71)   SpO2:  [92 %-100 %] .   Home meds for hypertension were reviewed and noted below.   Hypertension Medications             furosemide (LASIX) 20 MG tablet TAKE 1 TABLET(20 MG) BY MOUTH EVERY DAY    lisinopriL 10 MG tablet Take 1 tablet (10 mg total) by mouth once daily.    metoprolol tartrate (LOPRESSOR) 50 MG tablet TAKE 1 TABLET(50 MG) BY MOUTH TWICE DAILY        While in the hospital, will manage blood pressure as follows; Adjust home antihypertensive regimen as follows- Hold lisinopril and lasix in the setting of ODESSA, resume lopressor    Will utilize p.r.n. blood pressure medication only if patient's blood pressure greater than  180/110 and she develops symptoms such as worsening chest pain or shortness of breath.      VTE Risk Mitigation (From admission, onward)         Ordered     apixaban tablet 10 mg  2 times daily         04/18/22 1859     IP VTE HIGH RISK PATIENT  Once         04/13/22 0246     Place sequential compression device  Until discontinued         04/13/22 0246                Discharge Planning   HUMBERTO: 4/18/2022     Code Status: Full Code   Is the patient medically ready for discharge?:     Reason for patient still in hospital (select all that apply): Patient new problem and Patient trending condition  Discharge Plan A: Psychiatric hospital   Discharge Delays: (!) Other (IP Psych Placement)              Aniya Ramirez PA-C  Department of Hospital Medicine   [place] [facility]

## 2022-04-21 NOTE — NURSING
Lab called for critical potassium 6.7,secured chat JERSON quispe,ordered for lokelma received and PA said he will let the nephrologist knows

## 2022-04-21 NOTE — PROGRESS NOTES
Pharmacokinetic Assessment Follow Up: IV Vancomycin    Vancomycin serum concentration assessment(s):    The random level was drawn incorrectly and cannot be used to guide therapy at this time. Level was drawn right after infusion.    Vancomycin Regimen Plan:    Continue regimen to Vancomycin 1250 mg IV every 24 hours with next serum trough concentration measured at 1330 prior to next dose on 4/21    Drug levels (last 3 results):  Recent Labs   Lab Result Units 04/20/22  1540   Vancomycin, Random ug/mL 30.3       Pharmacy will continue to follow and monitor vancomycin.    Please contact pharmacy at extension [phone] for questions regarding this assessment.    Thank you for the consult,   Rudi Arredondo       Patient brief summary:  Audrey Natarajan is a 51 y.o. female initiated on antimicrobial therapy with IV Vancomycin for treatment of skin & soft tissue infection    The patient's current regimen is vancomycin 1.25g q24    Drug Allergies:   Review of patient's allergies indicates:   Allergen Reactions    Morphine Other (See Comments)     Patient had a psychotic episode after taking Morphine  Agitation, hallucinations    Penicillins Anaphylaxis     itching    Januvia [sitagliptin] Hives       Actual Body Weight:   107.8kg    Renal Function:   Estimated Creatinine Clearance: 34.6 mL/min (A) (based on SCr of 2.4 mg/dL (H)).,     Dialysis Method (if applicable):      CBC (last 72 hours):  Recent Labs   Lab Result Units 04/18/22  1246 04/19/22  0512 04/20/22  1312   WBC K/uL 16.31* 18.05* 17.68*   Hemoglobin g/dL 10.5* 10.7* 10.5*   Hematocrit % 31.9* 35.4* 32.4*   Platelets K/uL 683* 536* 746*   Gran % % 53.3 78.0* 58.4   Lymph % % 29.8 15.0* 22.1   Mono % % 8.4 1.0* 12.6   Eosinophil % % 3.3 1.0 2.3   Basophil % % 0.7 2.0* 0.5   Differential Method  Automated Manual Automated       Metabolic Panel (last 72 hours):  Recent Labs   Lab Result Units 04/18/22  1246 04/19/22  0512 04/19/22  1745 04/20/22  0501 04/20/22  1345    Sodium mmol/L 138 134*  --  130*  --    Sodium, Urine mmol/L  --   --   --   --  41  41   Potassium mmol/L 4.4 5.5* 4.6 5.4*  --    Chloride mmol/L 97 97  --  99  --    CO2 mmol/L 27 23  --  15*  --    Glucose mg/dL 237* 242*  --  160*  --    BUN mg/dL 19 29*  --  49*  --    Creatinine mg/dL 0.8 1.0  --  2.4*  --    Creatinine, Urine mg/dL  --   --   --   --  48.5  48.5  48.5   Albumin g/dL  --  3.4*  --  2.9*  --    Total Bilirubin mg/dL  --  0.2  --  0.2  --    Alkaline Phosphatase U/L  --  87  --  61  --    AST U/L  --  14  --  22  --    ALT U/L  --  25  --  18  --        Vancomycin Administrations:  vancomycin given in the last 96 hours                     vancomycin (VANCOCIN) 2,000 mg in dextrose 5 % 500 mL IVPB (mg) 2,000 mg New Bag 04/20/22 1436                    Microbiologic Results:  Microbiology Results (last 7 days)       Procedure Component Value Units Date/Time    Blood culture [[id number]] Collected: 04/18/22 1946    Order Status: Completed Specimen: Blood from Antecubital, Left Arm Updated: 04/21/22 0612     Blood Culture, Routine No Growth to date      No Growth to date      No Growth to date    Blood culture [[id number]] Collected: 04/18/22 1946    Order Status: Completed Specimen: Blood from Antecubital, Right Arm Updated: 04/21/22 0612     Blood Culture, Routine No Growth to date      No Growth to date      No Growth to date    Culture, Anaerobe [[id number]]  (Abnormal) Collected: 04/13/22 1234    Order Status: Completed Specimen: Wound from Foot, Left Updated: 04/18/22 1226     Anaerobic Culture FINEGOLDIA MAGNA  Moderate      Blood culture [[id number]] Collected: 04/13/22 0309    Order Status: Completed Specimen: Blood from Peripheral, Antecubital, Left Updated: 04/18/22 1212     Blood Culture, Routine No growth after 5 days.    Blood culture [[id number]] Collected: 04/13/22 0309    Order Status: Completed Specimen: Blood from Peripheral, Antecubital, Left Updated: 04/18/22 1212     Blood  Culture, Routine No growth after 5 days.    Aerobic culture [[id number]]  (Abnormal)  (Susceptibility) Collected: 04/13/22 1234    Order Status: Completed Specimen: Wound from Foot, Left Updated: 04/16/22 1201     Aerobic Bacterial Culture METHICILLIN RESISTANT STAPHYLOCOCCUS AUREUS  Many  Skin rosenda also present

## 2022-04-21 NOTE — CONSULTS
[place] - Telemetry  Hematology/Oncology  Consult Note    Patient Name: Audrey Natarajan  MRN: [id number]  Admission Date: 4/12/2022  Hospital Length of Stay: 3 days  Code Status: Full Code   Attending Provider: Aniya Mcintosh MD  Consulting Provider: Rodo Zarate MD  Primary Care Physician: Donaldo Pena MD  Principal Problem:Diabetic foot ulcer associated with type 2 diabetes mellitus    Inpatient consult to Hematology/Oncology  Consult performed by: Rodo Zarate MD  Consult ordered by: Aniya Ramirez PA-C        Subjective:     HPI:  51-year-old female with history of recurrent VTEs, PAYNE, splenectomy and history of persistent leukocytosis and thrombocytosis admitted with fever 102 degrees F with worsening pain and swelling in the feet.  On broad-spectrum antibiotics.  Also found to have bilateral enlarged iliofemoral lymphadenopathy, measuring up to 15 mm in short axis approximately.  There is evidence of lower abdominal wall panniculitis/cellulitis.      Oncology Treatment Plan:   [Could not find a treatment plan. This SmartLink may be configured incorrectly. Contact a  for help.]    Medications:  Continuous Infusions:  Scheduled Meds:   apixaban  10 mg Oral BID    aspirin  81 mg Oral Daily    divalproex  1,250 mg Oral QHS    divalproex  500 mg Oral Daily    famotidine  20 mg Oral Daily    ferrous sulfate  1 tablet Oral Daily    fluticasone furoate-vilanteroL  1 puff Inhalation Daily    gabapentin  300 mg Oral BID    hydrOXYzine pamoate  50 mg Oral TID    insulin detemir U-100  10 Units Subcutaneous QHS    magnesium oxide  400 mg Oral BID    [START ON 4/22/2022] metoprolol tartrate  50 mg Oral Daily    metroNIDAZOLE  500 mg Oral Q8H    miconazole NITRATE 2 %   Topical (Top) BID    mupirocin   Nasal BID    nicotine  1 patch Transdermal Daily    pravastatin  40 mg Oral QHS    risperiDONE  2 mg Oral Daily    risperiDONE  3 mg Oral Nightly    sodium bicarbonate   1,300 mg Oral BID    vancomycin (VANCOCIN) IVPB  1,250 mg Intravenous Q12H     PRN Meds:acetaminophen, albuterol-ipratropium, dextrose 10%, dextrose 10%, glucagon (human recombinant), glucose, glucose, HYDROcodone-acetaminophen, hydrocortisone, insulin aspart U-100, melatonin, naloxone, ondansetron, senna-docusate 8.6-50 mg, sodium chloride 0.9%, DIPH,PERTUSS(ACELL),TET VACCINE (ADULT)(BOOSTRIX,ADACEL), Pharmacy to dose Vancomycin consult **AND** vancomycin - pharmacy to dose     Review of patient's allergies indicates:   Allergen Reactions    Morphine Other (See Comments)     Patient had a psychotic episode after taking Morphine  Agitation, hallucinations    Penicillins Anaphylaxis     itching    Januvia [sitagliptin] Hives        Past Medical History:   Diagnosis Date    ADHD (attention deficit hyperactivity disorder)     Arthritis     Asthma     Bipolar 1 disorder     Cataract     COPD (chronic obstructive pulmonary disease)     Coronary artery disease     A fib    Depression     bipolar manic depresson    Diabetes mellitus     DVT of lower extremity, bilateral July 2013    bilateral LE DVT. Estelita filter placed.     Encounter for blood transfusion     History of blood clots 1. Left Leg=2003; 2.Bilateral Groin=Blood Clots= 5 or 6/ 2013 & 7/2013; 3. LLL of Lung=7/2013;  4. Lt. Lower Leg=7/2013.     Pt. had 1st Blood Clot after Ucxkajmbkquu=5342, & Last=2013. [place] Filter= Rt.Lateral Neck.    HTN (hypertension) 6/6/2013    Pt states that she does not have hypertension    Hypercholesteremia     Irregular heartbeat     Neuromuscular disorder     neuropathy feet    PE (pulmonary embolism) July 2013     bilat LE DVT.     Restless leg syndrome      Past Surgical History:   Procedure Laterality Date    ABDOMINAL SURGERY  2010    gastric sleeve    BILATERAL OOPHORECTOMY Bilateral 1/12/2015    CHOLECYSTECTOMY      Green' s filter Right 7/4/2012    Right Neck & Tunneled Down.    HERNIA  "REPAIR      "[place] of Hernias Repaires around th [place].", pt. states    LAPAROSCOPIC CHOLECYSTECTOMY N/A 9/10/2020    Procedure: CHOLECYSTECTOMY, LAPAROSCOPIC;  Surgeon: Montrell Gutierrez MD;  Location: [facility] OR;  Service: General;  Laterality: N/A;  RN PREOP ----COVID Negative      OVARIAN CYST REMOVAL  3/13/2014    MO REMOVAL OF OVARY/TUBE(S)      SPLENECTOMY, TOTAL  2003    TONSILLECTOMY      as a child    TYMPANOSTOMY TUBE PLACEMENT      VEIN SURGERY      Lt leg     Family History       Problem Relation (Age of Onset)    Cataracts Father    Diabetes Father, Paternal Grandfather    Heart disease Father, Paternal Grandfather    Hypertension Father    No Known Problems Mother, Sister, Brother, Maternal Aunt, Maternal Uncle, Paternal Aunt, Paternal Uncle, Maternal Grandfather    Ovarian cancer Maternal Grandmother, Paternal Grandmother          Tobacco Use    Smoking status: Current Every Day Smoker     Packs/day: 1.00     Years: 37.00     Pack years: 37.00     Types: Cigarettes     Last attempt to quit: 2020     Years since quittin.3    Smokeless tobacco: Never Used    Tobacco comment: Enrolled in the [employer] on 5/3/14 ([facility] Member ID # [id number]). Ambulatory referral to Smoking Cessation Program   Substance and Sexual Activity    Alcohol use: No     Alcohol/week: 0.0 standard drinks    Drug use: No    Sexual activity: Yes     Partners: Male       Review of Systems   Constitutional:  Negative for appetite change and fever.   HENT:  Negative for congestion, rhinorrhea and sore throat.    Eyes:  Negative for photophobia and visual disturbance.   Respiratory:  Negative for cough and shortness of breath.    Cardiovascular:  Positive for leg swelling. Negative for chest pain.   Gastrointestinal:  Negative for abdominal pain, constipation, diarrhea, nausea and vomiting.   Genitourinary:  Negative for dysuria and hematuria.   Musculoskeletal:  Positive for arthralgias. "   Skin:  Positive for color change and wound.   Neurological:  Negative for speech difficulty, weakness and headaches.   Psychiatric/Behavioral:  Positive for agitation and behavioral problems. Negative for confusion and suicidal ideas. The patient is nervous/anxious.    Objective:     Vital Signs (Most Recent):  Temp: 97.3 °F (36.3 °C) (04/21/22 1155)  Pulse: (!) 112 (04/21/22 1622)  Resp: 20 (04/21/22 1622)  BP: 131/61 (04/21/22 1155)  SpO2: 100 % (04/21/22 1622)   Vital Signs (24h Range):  Temp:  [96.2 °F (35.7 °C)-98.7 °F (37.1 °C)] 97.3 °F (36.3 °C)  Pulse:  [] 112  Resp:  [17-20] 20  SpO2:  [92 %-100 %] 100 %  BP: (127-171)/(57-71) 131/61     Weight: 107.8 kg (237 lb 10.5 oz)  Body mass index is 39.55 kg/m².  Body surface area is 2.22 meters squared.      Intake/Output Summary (Last 24 hours) at 4/21/2022 1629  Last data filed at 4/21/2022 1347  Gross per 24 hour   Intake 738.79 ml   Output 3900 ml   Net -3161.21 ml       Physical Exam  Vitals and nursing note reviewed.   Constitutional:       General: She is not in acute distress.     Appearance: She is obese. She is not toxic-appearing.   HENT:      Head: Normocephalic and atraumatic.      Nose: Nose normal.      Mouth/Throat:      Mouth: Mucous membranes are moist.   Eyes:      Pupils: Pupils are equal, round, and reactive to light.   Cardiovascular:      Rate and Rhythm: Regular rhythm. Tachycardia present.      Pulses: Normal pulses.      Heart sounds: Normal heart sounds.   Pulmonary:      Effort: Pulmonary effort is normal.      Breath sounds: Normal breath sounds.   Abdominal:      General: Bowel sounds are normal.      Palpations: Abdomen is soft.   Musculoskeletal:         General: Tenderness present. Normal range of motion.      Cervical back: Normal range of motion.      Right lower leg: No edema.      Left lower leg: No edema.   Skin:     General: Skin is warm and dry.      Comments: Left leg in walking boot and both feet wrapped      Neurological:      Mental Status: She is alert and oriented to person, place, and time.   Psychiatric:         Behavior: Behavior normal.         Thought Content: Thought content normal.       Significant Labs:   All pertinent labs from the last 24 hours have been reviewed.    Diagnostic Results:  I have reviewed all pertinent imaging results/findings within the past 24 hours.    Assessment/Plan:     Lymphadenopathy, inguinal  -nonspecific iliofemoral lymphadenopathy noted on imaging  -likely secondary to Panniculitis  -no further workup needed    Thrombocytosis  -she has history of chronic thrombocytosis which is reactive and secondary to history of splenectomy  -thrombocytosis he is currently worsening and is secondary to sepsis  -hence no further workup needed  -okay to monitor platelet count once every 3 to 4 days        Thank you for your consult. I will follow-up with patient. Please contact us if you have any additional questions.    Rodo Zarate MD  Hematology/Oncology  [place] [facility]

## 2022-04-21 NOTE — PROGRESS NOTES
PSYCHIATRY INPATIENT PROGRESS NOTE  SUBSEQUENT HOSPITAL VISIT      4/21/2022 10:35 AM   Audrey Natarajan   1970   [id number]           DATE OF ADMISSION: 4/12/2022 11:28 PM    SITE: [facility]    CURRENT LEGAL STATUS: Judicial Commitment Petition Filed on 04/21/2022         HISTORY    Per Initial History from Primary Team:   Audrey Natarajan is a 52 yo female with a pmh of DM2, bipolar 1 disorder, cellulitis, COPD, HTN, CAD, DVT/PE. She presented to the ED with c/o fevers x 1 day. She also has BLE swelling and pain and wounds to both feet, worsening x 2 weeks. She had fever up to 102F yesterday. She reports the wounds to her right foot are from dragging her feet while she was angry. Denies drainage to foot wounds. She has had blood clots in the past and has an IVC filter placed in 2012. She was sent here from [facility] where she was under CEC for psychosis. ED workup revealed BLE DVT on venous US, WBC 31, and Hgb 9.9. She received a dose of vancomycin while in the ED.   Interval History from Primary Team on 04/20/2022:  patient is very agitated today and verbally abusing nursing staff, psych following and adjusting medications and needs continued CEC and inpatient psych once medically stable  - wound cultures growing MRSA, starting on vanc as well as growing anerobes on flagyl; leukocytosis improving today; follow up ID and podiatry recs  - patient's Cr went to 2.4 from 1.0; stopping lasix; giving 1L NS bolus; getting urine studies and renal U/S and nephrology has been consulted   - CT ab/pelv wo shows panniculitis/cellulitis of lower abdominal wall  - will be placed back onto inpatient service today        Chief Complaint / Reason for Original Psychiatry Consult: Psychosis / Bipolar I Disorder; Patient from [facility] on PEC/CEC       Subjective / Interval Psychiatric History Today (04/21/2022) (with psychiatric ROS below):   Audrey Natarajan is a 51 y.o. female with a past medical  "history as noted above/below, and a past psychiatric history of Bipolar I Disorder, psychosis, depression, and ADHD, currently being treated by her inpatient primary team for a principle problem of acute DVTs of BLEs and wound infection.  Psychiatry was originally consulted as noted above.  The patient was seen and examined again this AM.  The chart was reviewed again this AM.  Per nursing overnight: "Patient is becoming more and more erratic w tangenital speech and paranoia. Patient also is attempting to make some sort of moonshine/ (hootch?) concoction and is repeatedly requesting ice, coffee, fruit and milk to mix with judith and let ferment for days so that it "coat her kidneys and protect them". Patient's mood is extremely volatile and she has oscillated from laughing to sobbing in minutes. RN attempted to re orient patient several times but patient still refuses to stay in her room or stop asking for ingredients to make this "wine"."  On examination today, the patient remains alert and oriented to person, place, city, state, month, year, and situation.  She remains CAM-ICU negative for delirium.  She was able to visit with her step-daughter yesterday, and she seems calmer and more redirectable today, but she continues to exhibit a labile and bizarre affect.  She continues to perseverate on leaving the hospital and has notably limited insight into the need for IV antibiotics for her foot to heal at this time.  Primary Team and ID are awaiting f/u from podiatry regarding further medical/surgical management of foot wound.  She continues to appear with paranoia, racing thoughts, loosening of associations, pressured/rapid speech, and tangential / disorganized TP.  She denies issues with sleep overnight, but this appears to not be accurate based on RN report.  She endorses a good appetite.  She denies AH, VH, or TH (no RIS observed).  She denies any current/recent passive/active SI/HI.  She denies any adverse effects " "to her current medication regimen.  Regarding current medical/physical complaints, she again endorses improving diffuse MSK pain.  She denies any other medical complaints at this time.  NAD was observed during the examination.  Attempted, but the patient is unable to give a logical explanation for the bizarre liquid concoction that she was creating overnight.  She appears to think that this "will heal my insides and my foot."  See detailed psych ROS below.  Psychotherapy was again implemented with a focus on improving mood / anger / bibi / thought process and sleep.  See A/P below.      Collateral: I spoke to the patient's sister and her step-daughter.  They both are working to encourage the patient to comply with recommended treatment at this time.  Patient's sister states that the current psychosis / mood elements are not consistent with patient's baseline neuropsychiatric status.          Psychiatric Review Of Systems - Currently, the patient is endorsing and/or denying the following:  (patient's endorsements are BOLDED below; if not BOLDED, then patient denied):     Endorses Symptoms of Depression: diminished mood, low motivation, loss of interest/anhedonia, irritability, diminished energy, change in sleep, change in appetite, diminished concentration or cognition or indecisiveness, PMA/R, excessive guilt or hopelessness or worthlessness, suicidal ideations     Denies issues with Sleep: initiation, maintenance, early morning awakening with inability to return to sleep     Denies Suicidal/Homicidal ideations: active/passive ideations, organized plans, future intentions     Endorses Symptoms of psychosis: paranoia, hallucinations, delusions, disorganized thinking (intermittently more organized today), disorganized behavior or abnormal motor behavior, or negative symptoms (diminshed emotional expression, avolition, anhedonia, alogia, asociality)      Endorses Symptoms of bibi or hypomania: elevated, expansive, or " irritable mood with increased energy or activity; with inflated self-esteem or grandiosity, decreased need for sleep, increased rate of speech, FOI or racing thoughts, distractibility, increased goal directed activity or PMA, risky/disinhibited behavior     Denies Symptoms of MALISSA: excessive anxiety/worry/fear, more days than not, about numerous issues, difficult to control, with restlessness, fatigue, poor concentration, irritability, muscle tension, sleep disturbance; causes functionally impairing distress      Denies Symptoms of Panic Disorder: recurrent panic attacks, precipitated or un-precipitated, source of worry and/or behavioral changes secondary; with or without agoraphobia     Denies Symptoms of PTSD: h/o trauma; re-experiencing/intrusive symptoms, avoidant behavior, negative alterations in cognition or mood, or hyperarousal symptoms; with or without dissociative symptoms      Denies Symptoms of OCD: obsessions or compulsions      Denies Symptoms of Eating Disorders: anorexia, bulimia or binging     Denies Substance Use: intoxication, withdrawal, tolerance, used in larger amounts or duration than intended, unsuccessful attempts to limit or quit, increased time engaging in or seeking out, cravings or strong desire to use, failure to fulfill obligations, negative consequences in social/interpersonal/occupational,/recreational areas, use in dangerous situations, medical or psychological consequences         PSYCHOTHERAPY ADD-ON +[id number]   30 (16-37*) minutes     Time: 20 minutes  Participants: Met with patient     Therapeutic Intervention Type: behavior modifying psychotherapy, supportive psychotherapy  Why chosen therapy is appropriate versus another modality: relevant to diagnosis, patient responds to this modality, evidence based practice     Target symptoms: mood / anger / bibi / TERRI and insomnia   Primary focus: improving mood / anger / bibi / thought process and sleep  Psychotherapeutic  techniques: supportive and psychodynamic techniques; psycho-education; deep breathing exercises; CBT; problem solving techniques and managing life stressors     Outcome monitoring methods: self-report, observation     Patient's response to intervention:  The patient's response to intervention is more accepting today / limited due to psychosis / bibi      Progress toward goals:  The patient's progress toward goals is fair / limited / slightly improved today         ROS  General ROS: negative for - chills, fatigue, fever or night sweats  Ophthalmic ROS: negative for - blurry vision, double vision or eye pain  ENT ROS: negative for - sinus pain, headaches, sore throat or visual changes  Allergy and Immunology ROS: negative for - hives, itchy/watery eyes or nasal congestion  Hematological and Lymphatic ROS: negative for - bleeding problems, bruising, jaundice or pallor  Endocrine ROS: negative for - galactorrhea, hot flashes, mood swings, palpitations or temperature intolerance  Respiratory ROS: negative for - cough, hemoptysis, shortness of breath, tachypnea or wheezing  Cardiovascular ROS: negative for - chest pain, dyspnea on exertion, loss of consciousness, palpitations, rapid heart rate or shortness of breath  Gastrointestinal ROS: negative for - appetite loss, nausea, abdominal pain, blood in stools, change in bowel habits, constipation or diarrhea  Genito-Urinary ROS: negative for - incontinence, nocturia or pelvic pain  Musculoskeletal ROS: negative for - joint stiffness; positive for diffuse MSK pain / discomfort (improving)  Neurological ROS: negative for - behavioral changes, confusion, dizziness, memory loss, numbness/tingling or seizures  Dermatological ROS: negative for dry skin, hair changes, pruritus or rash; positive for color change / wound (improving)  Psychiatric ROS: see detailed psychiatric ROS above in subjective section       PAST MEDICAL & SURGICAL HISTORY   Past Medical History:   Diagnosis  "Date    ADHD (attention deficit hyperactivity disorder)     Arthritis     Asthma     Bipolar 1 disorder     Cataract     COPD (chronic obstructive pulmonary disease)     Coronary artery disease     A fib    Depression     bipolar manic depresson    Diabetes mellitus     DVT of lower extremity, bilateral July 2013    bilateral LE DVT. Estelita filter placed.     Encounter for blood transfusion     History of blood clots 1. Left Leg=2003; 2.Bilateral Groin=Blood Clots= 5 or 6/ 2013 & 7/2013; 3. LLL of Lung=7/2013;  4. Lt. Lower Leg=7/2013.     Pt. had 1st Blood Clot after Hydcduyburdt=2933, & Last=2013. Estelita Filter= Rt.Lateral Neck.    HTN (hypertension) 6/6/2013    Pt states that she does not have hypertension    Hypercholesteremia     Irregular heartbeat     Neuromuscular disorder     neuropathy feet    PE (pulmonary embolism) July 2013     bilat LE DVT.     Restless leg syndrome      Past Surgical History:   Procedure Laterality Date    ABDOMINAL SURGERY  2010    gastric sleeve    BILATERAL OOPHORECTOMY Bilateral 1/12/2015    CHOLECYSTECTOMY      Green' s filter Right 7/4/2012    Right Neck & Tunneled Down.    HERNIA REPAIR      "[place] of Hernias Repaires around th [place].", pt. states    LAPAROSCOPIC CHOLECYSTECTOMY N/A 9/10/2020    Procedure: CHOLECYSTECTOMY, LAPAROSCOPIC;  Surgeon: Montrell Gutierrez MD;  Location: [facility] OR;  Service: General;  Laterality: N/A;  RN PREOP 9/9----COVID Negative  9/9    OVARIAN CYST REMOVAL  3/13/2014    VT REMOVAL OF OVARY/TUBE(S)      SPLENECTOMY, TOTAL  July 2003    TONSILLECTOMY      as a child    TYMPANOSTOMY TUBE PLACEMENT  1976    VEIN SURGERY  2003    Lt leg       FAMILY HISTORY   Family History   Problem Relation Age of Onset    Hypertension Father     Diabetes Father     Heart disease Father     Cataracts Father     Diabetes Paternal Grandfather     Heart disease Paternal Grandfather     No Known Problems Mother     " Ovarian cancer Maternal Grandmother          from this. ? age     No Known Problems Sister     No Known Problems Brother     No Known Problems Maternal Aunt     No Known Problems Maternal Uncle     No Known Problems Paternal Aunt     No Known Problems Paternal Uncle     No Known Problems Maternal Grandfather     Ovarian cancer Paternal Grandmother     Uterine cancer Neg Hx     Breast cancer Neg Hx     Colon cancer Neg Hx     Amblyopia Neg Hx     Blindness Neg Hx     Cancer Neg Hx     Glaucoma Neg Hx     Macular degeneration Neg Hx     Retinal detachment Neg Hx     Strabismus Neg Hx     Stroke Neg Hx     Thyroid disease Neg Hx        ALLERGIES   Review of patient's allergies indicates:   Allergen Reactions    Morphine Other (See Comments)     Patient had a psychotic episode after taking Morphine  Agitation, hallucinations    Penicillins Anaphylaxis     itching    Januvia [sitagliptin] Hives       CURRENT MEDICATION REGIMEN   Home Meds:   Prior to Admission medications    Medication Sig Start Date End Date Taking? Authorizing Provider   aspirin 81 MG Chew Take 1 tablet (81 mg total) by mouth once daily. 21 Yes Ifeoma Jacobs MD   DUPIXENT  mg/2 mL PnIj SMARTSI Milligram(s) SUB-Q Every 2 Weeks 22  Yes Historical Provider   famotidine (PEPCID) 20 MG tablet Take 20 mg by mouth 2 (two) times daily.   Yes Historical Provider   fluticasone-salmeterol diskus inhaler 250-50 mcg Inhale 1 puff into the lungs 2 (two) times daily. Controller 21 Yes Donaldo Pena MD   furosemide (LASIX) 20 MG tablet TAKE 1 TABLET(20 MG) BY MOUTH EVERY DAY 22  Yes Donaldo Pena MD   gabapentin (NEURONTIN) 300 MG capsule TAKE 2 CAPSULES(600 MG) BY MOUTH TWICE DAILY 22  Yes Donaldo Pena MD   hydrOXYzine (ATARAX) 50 MG tablet Take 50 mg by mouth 4 (four) times daily as needed. 3/17/22  Yes Historical Provider   ibuprofen (ADVIL,MOTRIN) 600 MG tablet TAKE 1  TABLET(600 MG) BY MOUTH EVERY 8 HOURS AS NEEDED FOR PAIN OR BACK PAIN 4/11/22  Yes Donaldo Pena MD   lisinopriL 10 MG tablet Take 1 tablet (10 mg total) by mouth once daily. 4/4/22  Yes Donaldo Pena MD   loratadine (CLARITIN) 10 mg tablet Take 1 tablet (10 mg total) by mouth once daily. 12/30/21 12/30/22 Yes Lary Sweet DO   metFORMIN (GLUCOPHAGE) 1000 MG tablet TAKE 1 TABLET(1000 MG) BY MOUTH TWICE DAILY WITH MEALS 12/26/21  Yes Donaldo Pena MD   metoprolol tartrate (LOPRESSOR) 50 MG tablet TAKE 1 TABLET(50 MG) BY MOUTH TWICE DAILY 12/26/21  Yes Donaldo Pena MD   pravastatin (PRAVACHOL) 40 MG tablet TAKE 1 TABLET(40 MG) BY MOUTH EVERY EVENING 12/26/21  Yes Donaldo Pena MD   acetaminophen (TYLENOL) 500 MG tablet Take 2 tablets (1,000 mg total) by mouth every 6 (six) hours as needed for Pain. 7/13/21   Lary Sweet DO   albuterol (PROVENTIL/VENTOLIN HFA) 90 mcg/actuation inhaler Inhale 2 puffs into the lungs every 6 (six) hours as needed for Wheezing. Use with spacer  Dispense with 1 spacer 12/30/21 12/30/22  Lary Sweet DO   albuterol-ipratropium (DUO-NEB) 2.5 mg-0.5 mg/3 mL nebulizer solution Take 3 mLs by nebulization every 6 (six) hours as needed for Wheezing or Shortness of Breath. Rescue 7/19/21 7/19/22  Donaldo Pena MD   aluminum-magnesium hydroxide-simethicone (MAALOX) 200-200-20 mg/5 mL Susp Take 30 mLs by mouth every 6 (six) hours as needed (indigestion). 2/20/21 2/20/22  Ifeoma Jacobs MD   blood sugar diagnostic Strp To check BG two  times daily, to use with insurance preferred meter 9/30/21   Donaldo Pena MD   blood-glucose meter kit To check BG once daily, to use with insurance preferred meter 2/20/21 12/8/23  Ifeoma Jacobs MD   blood-glucose meter kit To check BG two times daily, to use with insurance preferred meter 9/30/21 9/30/22  Donaldo Pena MD   dicyclomine (BENTYL) 20 mg tablet Take 1 tablet (20 mg total) by mouth every 6 (six) hours. 3/12/22   Tu GARCIA  CAROL ANN Arredondo   divalproex (DEPAKOTE) 500 MG TbEC Take 1 tablet (500 mg total) by mouth once daily. 4/19/22 4/19/23  Diego Ridley MD   divalproex (DEPAKOTE) 500 MG TbEC Take 2 tablets (1,000 mg total) by mouth every evening. 4/18/22 4/18/23  Diego Ridley MD   enoxaparin (LOVENOX) 100 mg/mL Syrg Inject 1 mL (100 mg total) into the skin every 12 (twelve) hours. 4/18/22   Diego Ridley MD   EPITOL 200 mg tablet  2/26/21   Historical Provider   fluticasone propionate (FLONASE) 50 mcg/actuation nasal spray 1 spray (50 mcg total) by Each Nostril route 2 (two) times daily. 12/30/21   Lary Sweet DO   folic acid (FOLVITE) 1 MG tablet Take 1 tablet (1 mg total) by mouth once daily. 7/19/21 10/17/21  Donaldo Pena MD   hydrOXYzine pamoate (VISTARIL) 50 MG Cap Take 1 capsule (50 mg total) by mouth every 8 (eight) hours as needed (insomnia). 4/18/22   Diego Ridley MD   lancets Misc To check BG two times daily, to use with insurance preferred meter 9/30/21   Donaldo Pena MD   multivitamin Tab Take 1 tablet by mouth once daily. 2/20/21   Ifeoma Jacobs MD   [facility] Inhale into the lungs. 12/30/21   Historical Provider   pantoprazole (PROTONIX) 40 MG tablet Take 1 tablet (40 mg total) by mouth once daily. 7/13/21   Aiden Oleary MD   polyvinyl alcohol, artificial tears, (LIQUIFILM TEARS) 1.4 % ophthalmic solution Place 1 drop into both eyes 4 (four) times daily. 2/20/21   Ifeoma Jacobs MD   risperiDONE (RISPERDAL M-TABS) 2 MG disintegrating tablet Take 1 tablet (2 mg total) by mouth 2 (two) times daily. 4/18/22 4/18/23  Diego Ridley MD   senna (SENOKOT) 8.6 mg tablet Take 1 tablet by mouth 2 (two) times a day. 2/20/21   Ifeoma Jacobs MD   sulfamethoxazole-trimethoprim 800-160mg (BACTRIM DS) 800-160 mg Tab Take 2 tablets by mouth 2 (two) times daily. for 10 days 4/18/22 4/28/22  Diego Ridley MD   TRUE METRIX GLUCOSE METER [facility] CHECK BLOOD SUGAR TWICE DAILY 11/4/21   Donaldo VIDALES  MD Becky   diclofenac sodium (VOLTAREN) 1 % Gel Apply 2 g topically 4 (four) times daily as needed (Apply to painful area up to 4 times a day as needed for pain). Apply to painful area 4 times a day as needed for pain 10/1/21 4/18/22  Corie Iqbal NP   nystatin (NYSTOP) powder APPLY TOPICALLY TO AXILLA AND BREAST FOLDS TWICE DAILY 9/30/21 4/18/22  Donaldo Pena MD   QUEtiapine (SEROQUEL) 200 MG Tab Take 1 tablet (200 mg total) by mouth before breakfast. 2/21/21 4/18/22  Ifeoma Jacobs MD       Scheduled Meds:    apixaban  10 mg Oral BID    aspirin  81 mg Oral Daily    divalproex  1,250 mg Oral QHS    divalproex  500 mg Oral Daily    famotidine  20 mg Oral Daily    ferrous sulfate  1 tablet Oral Daily    fluticasone furoate-vilanteroL  1 puff Inhalation Daily    gabapentin  300 mg Oral BID    hydrOXYzine pamoate  50 mg Oral TID    insulin detemir U-100  10 Units Subcutaneous QHS    magnesium oxide  400 mg Oral BID    metroNIDAZOLE  500 mg Oral Q8H    miconazole NITRATE 2 %   Topical (Top) BID    mupirocin   Nasal BID    nicotine  1 patch Transdermal Daily    pravastatin  40 mg Oral QHS    risperiDONE  2 mg Oral Daily    risperiDONE  3 mg Oral Nightly    sodium bicarbonate  1,300 mg Oral BID    vancomycin (VANCOCIN) IVPB  1,250 mg Intravenous Q24H      PRN Meds: acetaminophen, albuterol-ipratropium, dextrose 10%, dextrose 10%, glucagon (human recombinant), glucose, glucose, HYDROcodone-acetaminophen, hydrocortisone, insulin aspart U-100, melatonin, naloxone, ondansetron, senna-docusate 8.6-50 mg, sodium chloride 0.9%, Pharmacy to dose Vancomycin consult **AND** vancomycin - pharmacy to dose   Psychotherapeutics (From admission, onward)            Start     Stop Route Frequency Ordered    04/20/22 0900  risperiDONE disintegrating tablet 2 mg         -- Oral Daily 04/19/22 1439    04/19/22 2100  risperiDONE disintegrating tablet 3 mg         -- Oral Nightly 04/19/22 1439          LABORATORY  DATA   Recent Results (from the past 72 hour(s))   CBC Auto Differential    Collection Time: 04/18/22 12:46 PM   Result Value Ref Range    WBC 16.31 (H) 3.90 - 12.70 K/uL    RBC 3.82 (L) 4.00 - 5.40 M/uL    Hemoglobin 10.5 (L) 12.0 - 16.0 g/dL    Hematocrit 31.9 (L) 37.0 - 48.5 %    MCV 84 82 - 98 fL    MCH 27.5 27.0 - 31.0 pg    MCHC 32.9 32.0 - 36.0 g/dL    RDW 15.4 (H) 11.5 - 14.5 %    Platelets 683 (H) 150 - 450 K/uL    MPV 10.0 9.2 - 12.9 fL    Immature Granulocytes 4.5 (H) 0.0 - 0.5 %    Gran # (ANC) 8.7 (H) 1.8 - 7.7 K/uL    Immature Grans (Abs) 0.73 (H) 0.00 - 0.04 K/uL    Lymph # 4.9 (H) 1.0 - 4.8 K/uL    Mono # 1.4 (H) 0.3 - 1.0 K/uL    Eos # 0.5 0.0 - 0.5 K/uL    Baso # 0.12 0.00 - 0.20 K/uL    nRBC 0 0 /100 WBC    Gran % 53.3 38.0 - 73.0 %    Lymph % 29.8 18.0 - 48.0 %    Mono % 8.4 4.0 - 15.0 %    Eosinophil % 3.3 0.0 - 8.0 %    Basophil % 0.7 0.0 - 1.9 %    Aniso Slight     Poik Slight     Hypo Occasional     Ovalocytes Occasional     Target Cells Occasional     [place] Cells Occasional     Differential Method Automated    Sedimentation rate    Collection Time: 04/18/22 12:46 PM   Result Value Ref Range    Sed Rate 96 (H) 0 - 20 mm/Hr   C-reactive protein    Collection Time: 04/18/22 12:46 PM   Result Value Ref Range    CRP 56.3 (H) 0.0 - 8.2 mg/L   Basic Metabolic Panel    Collection Time: 04/18/22 12:46 PM   Result Value Ref Range    Sodium 138 136 - 145 mmol/L    Potassium 4.4 3.5 - 5.1 mmol/L    Chloride 97 95 - 110 mmol/L    CO2 27 23 - 29 mmol/L    Glucose 237 (H) 70 - 110 mg/dL    BUN 19 6 - 20 mg/dL    Creatinine 0.8 0.5 - 1.4 mg/dL    Calcium 10.5 8.7 - 10.5 mg/dL    Anion Gap 14 8 - 16 mmol/L    eGFR if African American >60 >60 mL/min/1.73 m^2    eGFR if non African American >60 >60 mL/min/1.73 m^2   COVID-19 Rapid Screening    Collection Time: 04/18/22  1:19 PM   Result Value Ref Range    SARS-CoV-2 RNA, Amplification, Qual Negative Negative   POCT glucose    Collection Time: 04/18/22  5:35 PM    Result Value Ref Range    POCT Glucose 266 (H) 70 - 110 mg/dL   POCT glucose    Collection Time: 04/18/22  7:43 PM   Result Value Ref Range    POCT Glucose 189 (H) 70 - 110 mg/dL   Lactic acid, plasma    Collection Time: 04/18/22  7:46 PM   Result Value Ref Range    Lactate (Lactic Acid) 1.3 0.5 - 2.2 mmol/L   Blood culture    Collection Time: 04/18/22  7:46 PM    Specimen: Antecubital, Right Arm; Blood   Result Value Ref Range    Blood Culture, Routine No Growth to date     Blood Culture, Routine No Growth to date     Blood Culture, Routine No Growth to date    Blood culture    Collection Time: 04/18/22  7:46 PM    Specimen: Antecubital, Left Arm; Blood   Result Value Ref Range    Blood Culture, Routine No Growth to date     Blood Culture, Routine No Growth to date     Blood Culture, Routine No Growth to date    POCT glucose    Collection Time: 04/19/22  5:09 AM   Result Value Ref Range    POCT Glucose 246 (H) 70 - 110 mg/dL   CBC Auto Differential    Collection Time: 04/19/22  5:12 AM   Result Value Ref Range    WBC 18.05 (H) 3.90 - 12.70 K/uL    RBC 3.91 (L) 4.00 - 5.40 M/uL    Hemoglobin 10.7 (L) 12.0 - 16.0 g/dL    Hematocrit 35.4 (L) 37.0 - 48.5 %    MCV 91 82 - 98 fL    MCH 27.4 27.0 - 31.0 pg    MCHC 30.2 (L) 32.0 - 36.0 g/dL    RDW 16.1 (H) 11.5 - 14.5 %    Platelets 536 (H) 150 - 450 K/uL    MPV 10.5 9.2 - 12.9 fL    Immature Granulocytes CANCELED 0.0 - 0.5 %    Immature Grans (Abs) CANCELED 0.00 - 0.04 K/uL    nRBC 0 0 /100 WBC    Gran % 78.0 (H) 38.0 - 73.0 %    Lymph % 15.0 (L) 18.0 - 48.0 %    Mono % 1.0 (L) 4.0 - 15.0 %    Eosinophil % 1.0 0.0 - 8.0 %    Basophil % 2.0 (H) 0.0 - 1.9 %    Bands 1.0 %    Myelocytes 2.0 %    Platelet Estimate Increased (A)     Aniso Slight     Poik Slight     Hypo Occasional     Ovalocytes Occasional     Acanthocytes Present     Differential Method Manual    Comprehensive metabolic panel    Collection Time: 04/19/22  5:12 AM   Result Value Ref Range    Sodium 134 (L)  136 - 145 mmol/L    Potassium 5.5 (H) 3.5 - 5.1 mmol/L    Chloride 97 95 - 110 mmol/L    CO2 23 23 - 29 mmol/L    Glucose 242 (H) 70 - 110 mg/dL    BUN 29 (H) 6 - 20 mg/dL    Creatinine 1.0 0.5 - 1.4 mg/dL    Calcium 10.5 8.7 - 10.5 mg/dL    Total Protein 7.9 6.0 - 8.4 g/dL    Albumin 3.4 (L) 3.5 - 5.2 g/dL    Total Bilirubin 0.2 0.1 - 1.0 mg/dL    Alkaline Phosphatase 87 55 - 135 U/L    AST 14 10 - 40 U/L    ALT 25 10 - 44 U/L    Anion Gap 14 8 - 16 mmol/L    eGFR if African American >60 >60 mL/min/1.73 m^2    eGFR if non African American >60 >60 mL/min/1.73 m^2   Protime-INR    Collection Time: 04/19/22  6:40 AM   Result Value Ref Range    Prothrombin Time 10.3 9.0 - 12.5 sec    INR 1.0 0.8 - 1.2   POCT glucose    Collection Time: 04/19/22 11:26 AM   Result Value Ref Range    POCT Glucose 249 (H) 70 - 110 mg/dL   POCT glucose    Collection Time: 04/19/22  4:25 PM   Result Value Ref Range    POCT Glucose 170 (H) 70 - 110 mg/dL   Potassium    Collection Time: 04/19/22  5:45 PM   Result Value Ref Range    Potassium 4.6 3.5 - 5.1 mmol/L   Comprehensive metabolic panel    Collection Time: 04/20/22  5:01 AM   Result Value Ref Range    Sodium 130 (L) 136 - 145 mmol/L    Potassium 5.4 (H) 3.5 - 5.1 mmol/L    Chloride 99 95 - 110 mmol/L    CO2 15 (L) 23 - 29 mmol/L    Glucose 160 (H) 70 - 110 mg/dL    BUN 49 (H) 6 - 20 mg/dL    Creatinine 2.4 (H) 0.5 - 1.4 mg/dL    Calcium 10.2 8.7 - 10.5 mg/dL    Total Protein 6.3 6.0 - 8.4 g/dL    Albumin 2.9 (L) 3.5 - 5.2 g/dL    Total Bilirubin 0.2 0.1 - 1.0 mg/dL    Alkaline Phosphatase 61 55 - 135 U/L    AST 22 10 - 40 U/L    ALT 18 10 - 44 U/L    Anion Gap 16 8 - 16 mmol/L    eGFR if African American 27 (A) >60 mL/min/1.73 m^2    eGFR if non African American 23 (A) >60 mL/min/1.73 m^2   CK    Collection Time: 04/20/22  5:01 AM   Result Value Ref Range     (H) 20 - 180 U/L   POCT glucose    Collection Time: 04/20/22  5:44 AM   Result Value Ref Range    POCT Glucose 182 (H) 70  - 110 mg/dL   CBC Auto Differential    Collection Time: 04/20/22  1:12 PM   Result Value Ref Range    WBC 17.68 (H) 3.90 - 12.70 K/uL    RBC 3.83 (L) 4.00 - 5.40 M/uL    Hemoglobin 10.5 (L) 12.0 - 16.0 g/dL    Hematocrit 32.4 (L) 37.0 - 48.5 %    MCV 85 82 - 98 fL    MCH 27.4 27.0 - 31.0 pg    MCHC 32.4 32.0 - 36.0 g/dL    RDW 15.7 (H) 11.5 - 14.5 %    Platelets 746 (H) 150 - 450 K/uL    MPV 10.1 9.2 - 12.9 fL    Immature Granulocytes 4.1 (H) 0.0 - 0.5 %    Gran # (ANC) 10.3 (H) 1.8 - 7.7 K/uL    Immature Grans (Abs) 0.72 (H) 0.00 - 0.04 K/uL    Lymph # 3.9 1.0 - 4.8 K/uL    Mono # 2.2 (H) 0.3 - 1.0 K/uL    Eos # 0.4 0.0 - 0.5 K/uL    Baso # 0.09 0.00 - 0.20 K/uL    nRBC 0 0 /100 WBC    Gran % 58.4 38.0 - 73.0 %    Lymph % 22.1 18.0 - 48.0 %    Mono % 12.6 4.0 - 15.0 %    Eosinophil % 2.3 0.0 - 8.0 %    Basophil % 0.5 0.0 - 1.9 %    Platelet Estimate Increased (A)     Aniso Slight     Poik Slight     Poly Occasional     Hypo Occasional     Target Cells Occasional     Comfort Cells Occasional     Differential Method Automated    Sodium, Random Urine    Collection Time: 04/20/22  1:45 PM   Result Value Ref Range    Sodium, Urine 41 20 - 250 mmol/L   Creatinine, Random Urine    Collection Time: 04/20/22  1:45 PM   Result Value Ref Range    Creatinine, Urine 48.5 15.0 - 325.0 mg/dL   Protein/Creatinine Ratio, Urine    Collection Time: 04/20/22  1:45 PM   Result Value Ref Range    Protein, Urine Random <7 0 - 15 mg/dL    Creatinine, Urine 48.5 15.0 - 325.0 mg/dL    Prot/Creat Ratio, Urine Unable to calculate 0.00 - 0.20   Urea nitrogen, urine    Collection Time: 04/20/22  1:45 PM   Result Value Ref Range    Urine Urea Nitrogen 419 140 - 1050 mg/dL   Protein/Creatinine Ratio, Urine    Collection Time: 04/20/22  1:45 PM   Result Value Ref Range    Protein, Urine Random <7 0 - 15 mg/dL    Creatinine, Urine 48.5 15.0 - 325.0 mg/dL    Prot/Creat Ratio, Urine Unable to calculate 0.00 - 0.20   Sodium, urine, random    Collection  Time: 04/20/22  1:45 PM   Result Value Ref Range    Sodium, Urine 41 20 - 250 mmol/L   Mccollum's Stain, Urine Random    Collection Time: 04/20/22  1:45 PM   Result Value Ref Range    Mccollum's Stain, Ur No eosinophils seen No eosinophils seen   Vancomycin, random    Collection Time: 04/20/22  3:40 PM   Result Value Ref Range    Vancomycin, Random 30.3 Not established ug/mL   POCT glucose    Collection Time: 04/20/22  5:30 PM   Result Value Ref Range    POCT Glucose 213 (H) 70 - 110 mg/dL   POCT glucose    Collection Time: 04/21/22  5:01 AM   Result Value Ref Range    POCT Glucose 235 (H) 70 - 110 mg/dL   CBC Auto Differential    Collection Time: 04/21/22  7:14 AM   Result Value Ref Range    WBC 15.65 (H) 3.90 - 12.70 K/uL    RBC 3.98 (L) 4.00 - 5.40 M/uL    Hemoglobin 10.9 (L) 12.0 - 16.0 g/dL    Hematocrit 34.3 (L) 37.0 - 48.5 %    MCV 86 82 - 98 fL    MCH 27.4 27.0 - 31.0 pg    MCHC 31.8 (L) 32.0 - 36.0 g/dL    RDW 15.9 (H) 11.5 - 14.5 %    Platelets 807 (H) 150 - 450 K/uL    MPV 11.1 9.2 - 12.9 fL    Immature Granulocytes 3.1 (H) 0.0 - 0.5 %    Gran # (ANC) 10.0 (H) 1.8 - 7.7 K/uL    Immature Grans (Abs) 0.49 (H) 0.00 - 0.04 K/uL    Lymph # 3.0 1.0 - 4.8 K/uL    Mono # 1.7 (H) 0.3 - 1.0 K/uL    Eos # 0.3 0.0 - 0.5 K/uL    Baso # 0.10 0.00 - 0.20 K/uL    nRBC 0 0 /100 WBC    Gran % 64.0 38.0 - 73.0 %    Lymph % 19.3 18.0 - 48.0 %    Mono % 11.1 4.0 - 15.0 %    Eosinophil % 1.9 0.0 - 8.0 %    Basophil % 0.6 0.0 - 1.9 %    Differential Method Automated    Renal Function Panel    Collection Time: 04/21/22  7:14 AM   Result Value Ref Range    Glucose 208 (H) 70 - 110 mg/dL    Sodium 130 (L) 136 - 145 mmol/L    Potassium 6.7 (HH) 3.5 - 5.1 mmol/L    Chloride 95 95 - 110 mmol/L    CO2 19 (L) 23 - 29 mmol/L    BUN 48 (H) 6 - 20 mg/dL    Calcium 11.4 (H) 8.7 - 10.5 mg/dL    Creatinine 1.3 0.5 - 1.4 mg/dL    Albumin 3.6 3.5 - 5.2 g/dL    Phosphorus 3.9 2.7 - 4.5 mg/dL    eGFR if African American 56 (A) >60 mL/min/1.73 m^2     "eGFR if non African American 48 (A) >60 mL/min/1.73 m^2    Anion Gap 16 8 - 16 mmol/L      Lab Results   Component Value Date    VALPROATE 64.3 04/16/2022    CBMZ 3.6 (L) 01/26/2021         EXAMINATION    VITALS   Vitals:    04/21/22 0345 04/21/22 0729 04/21/22 0816 04/21/22 1155   BP:  (!) 171/71  131/61   BP Location:  Left arm     Patient Position:  Lying  Sitting   Pulse: (!) 111 (!) 120 (!) 122 108   Resp:  20 18 20   Temp:  97.8 °F (36.6 °C)  97.3 °F (36.3 °C)   TempSrc:  Oral  Axillary   SpO2: (!) 92% 98% 99% 100%   Weight:       Height:            CONSTITUTIONAL  General Appearance: NAD, unremarkable, age appropriate, disheveled, pacing in room, overweight     MUSCULOSKELETAL  Muscle Strength and Tone: WNL  Abnormal Involuntary Movements: none observed   Gait and Station: WNL; non-ataxic      PSYCHIATRIC   Behavior/Cooperation:  intermittently cooperative, restless and fidgety, eye contact intermittent   Speech:  normal tone, normal pitch, loud volume, rapid/pressured   Language: grossly intact, able to name, able to repeat with spontaneous speech  Mood: "I'm ready to go home"  Affect:  Labile ; Elevated  Associations: intermittent TERRI  Thought Process: Linear with intermittent tangential / TERRI ; disorganization   Thought Content: + paranoia ; denies SI, HI, AH, VH, TH, delusions (no RIS observed)  Sensorium:  Awake  Alert and Oriented: to person, place, situation, month of year, year  Memory: 3/3 immediate, 2/3 at 5 minutes               Recent: Intact; able to report recent events              Remote: Intact; Named 4/4 past presidents   Attention/concentration: Fair.  Requiring frequent redirection. Appropriate for age/education. Able to spell w-o-r-l-d & d-l-r-o-w.   Similarities: Intact (difference between apple and orange?)  Abstract reasoning: Limited   Insight: Limited  Judgment: Limited     CAM ICU Delirium Assessment - NEGATIVE        Is the patient aware of the biomedical complications associated " with substance abuse and mental illness? yes           MEDICAL DECISION MAKING     ASSESSMENT      Bipolar I Disorder (currently severe with mixed s/s of depression and bibi)   Unspecified Insomnia   Hx of ADHD      RECOMMENDATIONS       - Patient is now in a Judicial Commitment Petition Filed Status (filed on 04/21/2022) due to continued grave disability 2/2 mental illness at this time (will have court paperwork scanned into chart once received from the hospital ).       - Once medically cleared / stable, seek transfer back to [facility] (or another inpatient psychiatric facility; patient may need Med-Psych placement) for continued mental health treatment / stabilization.     - Continue Risperdal 2 mg PO QAM & 3 mg PO QHS and Depakote ER 500 mg PO QAM and 1250 mg PO QHS for Bipolar I Disorder and insomnia (discussed risks/benefits/alt vs no treatment with patient)     - Continue Vistaril 50 mg PO TID (change to scheduled) for anxiety and/or insomnia (discussed risks/benefits/alt vs no treatment with patient)     - HOLD previously outpt prescribed Vyvanse (discussed risks/benefits/alt vs no treatment with patient)     - Can use Zyprexa 10 mg PO/IM q8 hours PRN for non-redirectable psychotic / manic agitation (discussed risks/benefits/alt vs no treatment with patient)     - Psychotherapy was again performed with patient as noted above with a focus on improving mood / anger / bibi / thought process and sleep.    - I spoke to the patient's sister and her step-daughter.  They both are working to encourage the patient to comply with recommended treatment at this time.  Patient's sister states that the current psychosis / mood elements are not consistent with patient's baseline neuropsychiatric status.       - Patient's most recent labs, imaging, and EKG were reviewed again today ; VPA level on 04/16/2022 was 64.3 ; Get repeat trough VPA level Saturday AM; monitor sodium & potassium status (management per  primary team & nephrology)      - Continue suicide / violence precautions and continue to monitor the patient with a sitter while on a PEC / CEC / CHANCE filed status.       - Thank you for this consult ; our team will continue to follow patient         Total time spent with patient and/or managing/coordinating patient's care today (excluding the time spent on psychotherapy): 42 minutes   Time spent on psychotherapy today (as noted above): 20 minutes   Total time for encounter today including psychotherapy: 62 minutes      More than 50% of the time was spent counseling/coordinating care.     Consulting clinician was informed of the encounter and consult note.      STAFF:  Magdiel Del Real MD  [facility]  4/21/2022

## 2022-04-21 NOTE — NURSING
at bedside,he said its ok for the patient to call/speak with the daughter,he said he will put the order.

## 2022-04-21 NOTE — PROGRESS NOTES
Nephrology Progress Note       Consult Requested By: Aniya Mcintosh MD  Reason for Consult: ODESSA     SUBJECTIVE:      ?    Review of Systems   Constitutional: Negative for chills and fever.   HENT: Negative for congestion and sore throat.    Eyes: Negative for blurred vision, double vision and photophobia.   Respiratory: Negative for cough and shortness of breath.    Cardiovascular: Negative for chest pain, palpitations and leg swelling.   Gastrointestinal: Negative for abdominal pain, diarrhea, nausea and vomiting.   Genitourinary: Negative for dysuria and urgency.   Musculoskeletal: Negative for joint pain and myalgias.   Skin: Negative for itching and rash.   Neurological: Negative for dizziness, sensory change, weakness and headaches.   Endo/Heme/Allergies: Negative for polydipsia. Does not bruise/bleed easily.   Psychiatric/Behavioral: Positive for depression. The patient is nervous/anxious.        Past Medical History:   Diagnosis Date    ADHD (attention deficit hyperactivity disorder)     Arthritis     Asthma     Bipolar 1 disorder     Cataract     COPD (chronic obstructive pulmonary disease)     Coronary artery disease     A fib    Depression     bipolar manic depresson    Diabetes mellitus     DVT of lower extremity, bilateral July 2013    bilateral LE DVT. [place] filter placed.     Encounter for blood transfusion     History of blood clots 1. Left Leg=2003; 2.Bilateral Groin=Blood Clots= 5 or 6/ 2013 & 7/2013; 3. LLL of Lung=7/2013;  4. Lt. Lower Leg=7/2013.     Pt. had 1st Blood Clot after Sflckhvdaanx=6839, & Last=2013. Putnam Filter= Rt.Lateral Neck.    HTN (hypertension) 6/6/2013    Pt states that she does not have hypertension    Hypercholesteremia     Irregular heartbeat     Neuromuscular disorder     neuropathy feet    PE (pulmonary embolism) July 2013     bilat LE DVT.     Restless leg syndrome        OBJECTIVE:     Vital Signs (Most Recent)  Vitals:    04/21/22 0339  04/21/22 0345 04/21/22 0729 04/21/22 0816   BP: 135/63  (!) 171/71    BP Location:   Left arm    Patient Position: Lying  Lying    Pulse: (!) 111 (!) 111 (!) 120 (!) 122   Resp: 17 20 18   Temp: 98.7 °F (37.1 °C)  97.8 °F (36.6 °C)    TempSrc: Oral  Oral    SpO2: (!) 92% (!) 92% 98% 99%   Weight:       Height:             Date 04/21/22 0700 - 04/22/22 0659   Shift 4067-9839 0383-5480 7530-7870 24 Hour Total   INTAKE   Shift Total(mL/kg)       OUTPUT   Urine(mL/kg/hr) 650   650   Shift Total(mL/kg) 650(6)   650(6)   Weight (kg) 107.8 107.8 107.8 107.8           Medications:   apixaban  10 mg Oral BID    aspirin  81 mg Oral Daily    divalproex  1,250 mg Oral QHS    divalproex  500 mg Oral Daily    famotidine  20 mg Oral Daily    ferrous sulfate  1 tablet Oral Daily    fluticasone furoate-vilanteroL  1 puff Inhalation Daily    gabapentin  300 mg Oral BID    hydrOXYzine pamoate  50 mg Oral TID    insulin detemir U-100  10 Units Subcutaneous QHS    magnesium oxide  400 mg Oral BID    metroNIDAZOLE  500 mg Oral Q8H    miconazole NITRATE 2 %   Topical (Top) BID    mupirocin   Nasal BID    nicotine  1 patch Transdermal Daily    pravastatin  40 mg Oral QHS    risperiDONE  2 mg Oral Daily    risperiDONE  3 mg Oral Nightly    sodium bicarbonate  1,300 mg Oral BID    vancomycin (VANCOCIN) IVPB  1,250 mg Intravenous Q24H           Physical Exam  Vitals and nursing note reviewed.   Constitutional:       General: She is not in acute distress.     Appearance: She is obese. She is not diaphoretic.   HENT:      Head: Normocephalic and atraumatic.      Mouth/Throat:      Pharynx: No oropharyngeal exudate.   Eyes:      General: No scleral icterus.     Conjunctiva/sclera: Conjunctivae normal.      Pupils: Pupils are equal, round, and reactive to light.   Cardiovascular:      Rate and Rhythm: Normal rate and regular rhythm.      Heart sounds: Normal heart sounds. No murmur heard.  Pulmonary:      Effort: Pulmonary  effort is normal. No respiratory distress.      Breath sounds: Normal breath sounds.   Abdominal:      General: Bowel sounds are normal. There is no distension.      Palpations: Abdomen is soft.      Tenderness: There is no abdominal tenderness.   Musculoskeletal:         General: Normal range of motion.      Cervical back: Normal range of motion and neck supple.      Comments: Foot wound    Skin:     General: Skin is warm and dry.      Findings: No erythema.   Neurological:      Mental Status: She is alert and oriented to person, place, and time.      Cranial Nerves: No cranial nerve deficit.   Psychiatric:         Mood and Affect: Affect normal.         Cognition and Memory: Memory normal.         Judgment: Judgment normal.         Laboratory:  Recent Labs   Lab 04/19/22  0512 04/20/22  1312 04/21/22  0714   WBC 18.05* 17.68* 15.65*   HGB 10.7* 10.5* 10.9*   HCT 35.4* 32.4* 34.3*   * 746* 807*   MONO 1.0* 12.6  2.2* 11.1  1.7*     Recent Labs   Lab 04/17/22  1050 04/18/22  1246 04/19/22  0512 04/19/22  1745 04/20/22  0501   * 138 134*  --  130*   K 4.9 4.4 5.5* 4.6 5.4*   CL 96 97 97  --  99   CO2 24 27 23  --  15*   BUN 13 19 29*  --  49*   CREATININE 0.7 0.8 1.0  --  2.4*   CALCIUM 10.1 10.5 10.5  --  10.2   PHOS 4.3  --   --   --   --        Diagnostic Results:  X-Ray: Reviewed  US: Reviewed  Echo: Reviewed  ASSESSMENT/PLAN:     1. ODESSA - big jump from baseline 0.9 to 2.4  ? Due to Bactrim also ddx:   ATN, Sepsis,  glomerulonephritis, AIN   -- get US kidney to rule out obstruction was not done yesterday - pending ordered as STAT, patient is poor historian and very frustrated  with all situation    -- Check Vanc levels before each dose  Already off Bactrim last Vanco levels 30 monitor   Treat - infection, need strict I&O not sure about UOP    -- Hold off on serological work up for now,  get basic   Pending labs today   -- No Indication for RRT at this time, K+ acceptable, No Uremia symptoms.  --  Daily Renal Function Panel  -- Avoid Hypotension.  -- Renally dose all meds  -- Please avoid nephrotoxins, including NSAIDs, aminoglycosides, IV contrast (unless absolutely necessary), gadolinium, fleets and other phosphorous-based laxatives. Caution with antibiotics.  2. Hyperkalemia  -  Rule out obstruction, give lokelma  10 x1   3. Acidosis -  PO bicarbonate avoid NS     Anemia ID - on PO iron   Recent Labs   Lab 04/19/22  0512 04/20/22  1312 04/21/22  0714   HGB 10.7* 10.5* 10.9*   HCT 35.4* 32.4* 34.3*   * 746* 807*       Iron   Lab Results   Component Value Date    IRON 13 (L) 04/13/2022    TIBC 456 (H) 04/13/2022    FERRITIN 84 04/13/2022       4. MBD (E88.9 M90.80) -  Recent Labs   Lab 04/17/22  1050 04/18/22  1246 04/20/22  0501   CALCIUM 10.1   < > 10.2   PHOS 4.3  --   --     < > = values in this interval not displayed.   Hypomagnesemia   Recent Labs   Lab 04/17/22  1050   MG 1.5*   replace Mg     Lab Results   Component Value Date    CALCIUM 10.2 04/20/2022    PHOS 4.3 04/17/2022     Lab Results   Component Value Date    EJJSTYHS40LS 35 02/09/2017       Lab Results   Component Value Date    CO2 15 (L) 04/20/2022       5. Nutrition/Hypoalbuminemia (E88.09) -    Recent Labs   Lab 04/19/22  0512 04/19/22  0640 04/20/22  0501   LABPROT  --  10.3  --    ALBUMIN 3.4*  --  2.9*     Nepro with meals TID. Renal vitamins daily      Thank you for the consult, will follow  With any question please call [phone]  Madhuri Fong MD    [facility]  FARHAD Mai MD, FACHECTOR LOGAN MD,   MD PADDY Fields MD E. V. Harmon, NP    [address]  [place], [id number] [phone]

## 2022-04-21 NOTE — PROGRESS NOTES
[facility]  Infectious Disease  Progress Note    Patient Name: Audrey Natarajan  MRN: [id number]  Admission Date: 4/12/2022  Length of Stay: 2 days  Attending Physician: Aniya Mcintosh MD  Primary Care Provider: Donaldo Pena MD    Isolation Status: Contact  Assessment/Plan:      * Diabetic foot ulcer associated with type 2 diabetes mellitus  50 y/o with psychiatric illness - currently under [facility], DM, DVT/PE history with IVC filter in place, s/p splenectomy; PCN allergy listed as anaphylaxis on chart;  Admitted with bilateral wounds to feet - left worse than right (left foot with charcot deformity) and recurrent DVTs both legs. Patient was given vancomycin X 1 in ED then clindamycin until 3/17 then now on bactrim and flagyl starting today. The left foot culture collected 4/13 positive for MRSA and finegoldia magna. Patient's WBC on admit was 31 then decreased to 12 but now up again to 18 on 4/19.     Rec  Creatinine increased to 2.4 today - stop bactrim and change to vancomycin; continue flagyl  Counseled patient on increased fluid intake  Bilateral DVT may be clouding the picture with the leg erythema (hard to tell difference in cellulitis vs DVT)   Need Podiatry to reassess foot wounds if not already done  Recommend updating Tdap - last one was January 2014 per chart - patient described trauma to the feet as cause of the ulcerations - so recommend updating Tdap           Anticipated Disposition: unclear at this point - not medically stable yet    Thank you for your consult. I will follow-up with patient. Please contact us if you have any additional questions.[phone]    Edita José MD  Infectious Disease  [facility]    Subjective:     Principal Problem:Diabetic foot ulcer associated with type 2 diabetes mellitus    HPI: 50 y/o with past medical history of ADHD (attention deficit hyperactivity disorder), Arthritis, Asthma, Bipolar 1 disorder, Cataract, COPD (chronic obstructive pulmonary  disease), Coronary artery disease, Depression, Diabetes mellitus, DVT of lower extremity, bilateral, IVC filter in place; Encounter for blood transfusion, History of blood clots, HTN (hypertension), Hypercholesteremia, Irregular heartbeat, Neuromuscular disorder, PE (pulmonary embolism), and Restless leg syndrome. Allergic to PCN - anaphylaxis and itching;     [facility] (currently under [facility]) for evaluation of myalgias, bilateral lower extremity swelling, bilateral foot pain, wounds to both feet, subjective fever, intermittent frontal headache. [facility] 4/12/22 and was diagnosed with cellulitis of both LE and bilateral foot lesions. Patient was given vanco in ED and than changed to clindamycin. Acute DVT of both LEs diagnosed 4/13 by US. L foot MRI on 4/14 positive for charcot foot, cellulitis and myositis. Podiatry saw patient 4/13/22 - right foot ulceration plantar aspect right distal forefoot and hallux - no signs of infection; left foot with charcot deformity, LLE with erythema and edema and ulcer to plantar left mid foot - was debrided - and extended to superficial fat layer and did not probe to bone.  BC 4/13 negative final. Left foot wound culture 4/13 positive for MRSA and finegoldia magna. Patient was given IV Vancomycin X 1 in ED on 4/12; then changed to clindamycin from 4/13 - 4/17 and then changed to bactrim and metronidazole on 4/18.     ID Consult 4/19 for help with antibiotics. Patient had WBC elevated to 31 on admit - it went down to 12 on 4/14 then increased to 18 on 4/18. Podiatry reassessing patient today. Patient seen and examined; she described serious reaction to penicillin in the past as listed on her chart. She had injured her left foot prior to admission. She describes abdominal ecchymoses from anticoagulation injections.   4/19 ct abdomen and pelvis - Iliofemoral lymphadenopathy bilaterally could be reactive or neoplastic  "in nature and clinical follow-up is suggested; Lower abdominal wall panniculitis/cellulitis without large hematoma or other fluid collection; Splenectomy and colonic diverticulosis   4/20 creatinine increased to 2.4; bactrim stopped changed to vancomycin with close pharmacy monitoring        Interval History: patient seen earlier today - no new complaints. Creatinine increased to 2.4 today - possibly due to bactrim - discussed with primary team and pharmacy and changed antibiotics from bactrim to vanco and continued the flagyl; CT abdomen and pelvis positive for panniculitis and lymphadenopathy, and splenectomy (old)    Review of Systems   Respiratory:  Negative for shortness of breath.    Gastrointestinal:  Negative for diarrhea, nausea and vomiting.   Psychiatric/Behavioral:  Positive for behavioral problems.    Antibiotics (From admission, onward)                Start     Stop Route Frequency Ordered    04/21/22 1030  vancomycin 1.25 g in dextrose 5% 250 mL IVPB (ready to mix)         -- IV Every 24 hours (non-standard times) 04/20/22 0931    04/20/22 1030  mupirocin 2 % ointment         04/25 0859 Nasl 2 times daily 04/20/22 0926    04/20/22 1025  vancomycin - pharmacy to dose  (vancomycin IVPB)        "And" Linked Group Details    -- IV pharmacy to manage frequency 04/20/22 0925    04/18/22 1930  metroNIDAZOLE tablet 500 mg         -- Oral Every 8 hours 04/18/22 1929           Objective:     Vital Signs (Most Recent):  Temp: 96.2 °F (35.7 °C) (04/20/22 1901)  Pulse: (!) 123 (04/20/22 1911)  Resp: 18 (04/20/22 1946)  BP: (!) 130/59 (04/20/22 1901)  SpO2: 98 % (04/20/22 1911)   Vital Signs (24h Range):  Temp:  [96.2 °F (35.7 °C)-97.8 °F (36.6 °C)] 96.2 °F (35.7 °C)  Pulse:  [] 123  Resp:  [17-20] 18  SpO2:  [95 %-100 %] 98 %  BP: ()/(55-69) 130/59     Weight: 107.8 kg (237 lb 10.5 oz)  Body mass index is 39.55 kg/m².    Estimated Creatinine Clearance: 34.6 mL/min (A) (based on SCr of 2.4 mg/dL " (H)).    Physical Exam  Cardiovascular:      Heart sounds: Normal heart sounds.   Pulmonary:      Breath sounds: Normal breath sounds.   Abdominal:      General: Bowel sounds are normal. There is no distension.      Palpations: Abdomen is soft.      Tenderness: There is no abdominal tenderness.      Comments: Has lower abdominal ecchymoses and indurated areas at sites of injections on right and left lower pannus   Musculoskeletal:      Right lower leg: No edema.      Left lower leg: No edema.      Comments: Left leg in walking boot and both feet wrapped        Significant Labs: Blood Culture:   Recent Labs   Lab 22  0309 22  1946   LABBLOO No growth after 5 days.  No growth after 5 days. No Growth to date  No Growth to date  No Growth to date  No Growth to date     CBC:   Recent Labs   Lab 22  0512 22  1312   WBC 18.05* 17.68*   HGB 10.7* 10.5*   HCT 35.4* 32.4*   * 746*     CMP:   Recent Labs   Lab 22  0512 22  1745 22  0501   *  --  130*   K 5.5* 4.6 5.4*   CL 97  --  99   CO2 23  --  15*   *  --  160*   BUN 29*  --  49*   CREATININE 1.0  --  2.4*   CALCIUM 10.5  --  10.2   PROT 7.9  --  6.3   ALBUMIN 3.4*  --  2.9*   BILITOT 0.2  --  0.2   ALKPHOS 87  --  61   AST 14  --  22   ALT 25  --  18   ANIONGAP 14  --  16   EGFRNONAA >60  --  23*       Urine Studies:   Recent Labs   Lab 22  0143   COLORU Yellow   APPEARANCEUA Clear   PHUR 6.0   SPECGRAV 1.015   PROTEINUA Negative   GLUCUA 1+*   KETONESU Negative   BILIRUBINUA Negative   OCCULTUA Negative   NITRITE Negative   UROBILINOGEN Negative   LEUKOCYTESUR Negative     Wound Culture:   Recent Labs   Lab 22  1234   LABAERO METHICILLIN RESISTANT STAPHYLOCOCCUS AUREUS  Many  Skin rosenda also present  *       Significant Imagin/20 bilateral foot x rays - Left-sided Charcot foot and diffuse bilateral soft tissue edema.  Similar findings were present on 2022.     ct abdomen and  pelvis - Impression:  No acute intra-abdominal process  Iliofemoral lymphadenopathy bilaterally could be reactive or neoplastic in nature and clinical follow-up is suggested  Lower abdominal wall panniculitis/cellulitis without large hematoma or other fluid collection  Splenectomy and colonic diverticulosis

## 2022-04-21 NOTE — ASSESSMENT & PLAN NOTE
Hx of DVT/PE, hypercoagulable state, IVC filter in place  BLE venous US with thrombosis of bilateral posterior tibial veins  Initially started on therapeutic lovenox    - Continue Eliquis

## 2022-04-21 NOTE — ASSESSMENT & PLAN NOTE
50 y/o with psychiatric illness - currently under [facility], DM, DVT/PE history with IVC filter in place, s/p splenectomy; PCN allergy listed as anaphylaxis on chart;  Admitted with bilateral wounds to feet - left worse than right (left foot with charcot deformity) and recurrent DVTs both legs. Patient was given vancomycin X 1 in ED then clindamycin until 3/17 then now on bactrim and flagyl starting today. The left foot culture collected 4/13 positive for MRSA and finegoldia magna. Patient's WBC on admit was 31 then decreased to 12 but now up again to 18 on 4/19.     Rec  Creatinine increased to 2.4 today - stop bactrim and change to vancomycin; continue flagyl  Counseled patient on increased fluid intake  Bilateral DVT may be clouding the picture with the leg erythema (hard to tell difference in cellulitis vs DVT)   Need Podiatry to reassess foot wounds if not already done  Recommend updating Tdap - last one was January 2014 per chart - patient described trauma to the feet as cause of the ulcerations - so recommend updating Tdap

## 2022-04-21 NOTE — PLAN OF CARE
TN reviewed chart. Per psych note: - Patient is now in a Judicial Commitment Petition Filed Status (filed on 04/21/2022) due to continued grave disability 2/2 mental illness at this time (will have court paperwork scanned into chart once received from the hospital ).      TN spoke with patient's sister Anitra. She stated Dr. Del Real has been in contact and updated her. All questions answered.     Plan to discharge to [facility] when medically stable.    Patient Contacts    Name Relation Home Work Mobile   Anitra Cain Sister [phone]     ManTabitha cadet Other [phone]     Catia Weathers Mother   [phone]   Ana Lopez Daughter   [phone]      04/21/22 1640   Discharge Reassessment   Assessment Type Discharge Planning Reassessment   Did the patient's condition or plan change since previous assessment? No   Discharge Plan discussed with: Sibling   Discharge Plan A Psychiatric hospital   Why the patient remains in the hospital Requires continued medical care   Post-Acute Status   Hospital Resources/Appts/Education Provided Appointments scheduled by Navigator/Coordinator     Sammi Root RN    [phone]

## 2022-04-21 NOTE — NURSING
"Patient is becoming more and more erratic w tangenital speech and paranoia. Patient also is attempting to make some sort of moonshine/ (hootch?) concoction and is repeatedly requesting ice, coffee, fruit and milk to mix with judith and let ferment for days so that it "coat her kidneys and protect them". Patient's mood is extremely volatile and she has oscillated from laughing to sobbing in minutes. RN attempted to re orient patient several times but patient still refuses to stay in her room or stop asking for ingredients to make this "wine".  "

## 2022-04-21 NOTE — ASSESSMENT & PLAN NOTE
B/l foot ulcers  - Podiatry consulted,  - Xray in feet bilaterally, findings consistent with Charcot joint of left foot, no acute abnormality   - MRI ordered edema noted as well as charcot changes of left foot without evidence of osteomyelitis  - E ulcer debrided with deep cultures taken.   - Site dressed with xeroform and kerlix. Nursing orders in for daily dressing changes  - She is [facility] to [facility] due to ulcer and history of charcot foot.  - Will continue to follow  - Cont clindamycin - culture growing Staph aureus--sensitive to clinda, switched   - Cultures grew out MRSA and anerobic bacteria; patient is septic today and rising WBC; switched clinda to bactrim and adding flagyl;       - Leukocytosis improving today  - Continue vancomycin and flagyl  - Follow up on ID and podiatry recs

## 2022-04-21 NOTE — HPI
51-year-old female with history of recurrent VTEs, PAYNE, splenectomy and history of persistent leukocytosis and thrombocytosis admitted with fever 102 degrees F with worsening pain and swelling in the feet.  On broad-spectrum antibiotics.  Also found to have bilateral enlarged iliofemoral lymphadenopathy, measuring up to 15 mm in short axis approximately.  There is evidence of lower abdominal wall panniculitis/cellulitis.

## 2022-04-21 NOTE — ASSESSMENT & PLAN NOTE
A1C 7.0  -Hold metformin  -accuchecks and MDSSI  -diabetic diet  -cont neurontin  Currently at goal for hospitalization

## 2022-04-21 NOTE — SUBJECTIVE & OBJECTIVE
Oncology Treatment Plan:   [Could not find a treatment plan. This SmartLink may be configured incorrectly. Contact a  for help.]    Medications:  Continuous Infusions:  Scheduled Meds:   apixaban  10 mg Oral BID    aspirin  81 mg Oral Daily    divalproex  1,250 mg Oral QHS    divalproex  500 mg Oral Daily    famotidine  20 mg Oral Daily    ferrous sulfate  1 tablet Oral Daily    fluticasone furoate-vilanteroL  1 puff Inhalation Daily    gabapentin  300 mg Oral BID    hydrOXYzine pamoate  50 mg Oral TID    insulin detemir U-100  10 Units Subcutaneous QHS    magnesium oxide  400 mg Oral BID    [START ON 4/22/2022] metoprolol tartrate  50 mg Oral Daily    metroNIDAZOLE  500 mg Oral Q8H    miconazole NITRATE 2 %   Topical (Top) BID    mupirocin   Nasal BID    nicotine  1 patch Transdermal Daily    pravastatin  40 mg Oral QHS    risperiDONE  2 mg Oral Daily    risperiDONE  3 mg Oral Nightly    sodium bicarbonate  1,300 mg Oral BID    vancomycin (VANCOCIN) IVPB  1,250 mg Intravenous Q12H     PRN Meds:acetaminophen, albuterol-ipratropium, dextrose 10%, dextrose 10%, glucagon (human recombinant), glucose, glucose, HYDROcodone-acetaminophen, hydrocortisone, insulin aspart U-100, melatonin, naloxone, ondansetron, senna-docusate 8.6-50 mg, sodium chloride 0.9%, DIPH,PERTUSS(ACELL),TET VACCINE (ADULT)(BOOSTRIX,ADACEL), Pharmacy to dose Vancomycin consult **AND** vancomycin - pharmacy to dose     Review of patient's allergies indicates:   Allergen Reactions    Morphine Other (See Comments)     Patient had a psychotic episode after taking Morphine  Agitation, hallucinations    Penicillins Anaphylaxis     itching    Januvia [sitagliptin] Hives        Past Medical History:   Diagnosis Date    ADHD (attention deficit hyperactivity disorder)     Arthritis     Asthma     Bipolar 1 disorder     Cataract     COPD (chronic obstructive pulmonary disease)     Coronary artery disease     A fib    Depression     bipolar  "manic depresson    Diabetes mellitus     DVT of lower extremity, bilateral July 2013    bilateral LE DVT. [employer] filter placed.     Encounter for blood transfusion     History of blood clots 1. Left Leg=2003; 2.Bilateral Groin=Blood Clots= 5 or 6/ 2013 & 7/2013; 3. LLL of Lung=7/2013;  4. Lt. Lower Leg=7/2013.     Pt. had 1st Blood Clot after Ekgnkjrvuwvr=6020, & Last=2013. [employer] Filter= Rt.Lateral Neck.    HTN (hypertension) 6/6/2013    Pt states that she does not have hypertension    Hypercholesteremia     Irregular heartbeat     Neuromuscular disorder     neuropathy feet    PE (pulmonary embolism) July 2013     bilat LE DVT.     Restless leg syndrome      Past Surgical History:   Procedure Laterality Date    ABDOMINAL SURGERY  2010    gastric sleeve    BILATERAL OOPHORECTOMY Bilateral 1/12/2015    CHOLECYSTECTOMY      Green' s filter Right 7/4/2012    Right Neck & Tunneled Down.    HERNIA REPAIR      "[place] [facility] Hernias [employer] around th [place].", pt. states    LAPAROSCOPIC CHOLECYSTECTOMY N/A 9/10/2020    Procedure: CHOLECYSTECTOMY, LAPAROSCOPIC;  Surgeon: Montrell Gutierrez MD;  Location: [facility];  Service: General;  Laterality: N/A;  RN PREOP 9/9----COVID Negative  9/9    OVARIAN CYST REMOVAL  3/13/2014    AR REMOVAL OF OVARY/TUBE(S)      SPLENECTOMY, TOTAL  July 2003    TONSILLECTOMY      as a child    TYMPANOSTOMY TUBE PLACEMENT  1976    VEIN SURGERY  2003    Lt leg     Family History       Problem Relation (Age of Onset)    Cataracts Father    Diabetes Father, Paternal Grandfather    Heart disease Father, Paternal Grandfather    Hypertension Father    No Known Problems Mother, Sister, Brother, Maternal Aunt, Maternal Uncle, Paternal Aunt, Paternal Uncle, Maternal Grandfather    Ovarian cancer Maternal Grandmother, Paternal Grandmother          Tobacco Use    Smoking status: Current Every Day Smoker     Packs/day: 1.00     Years: 37.00     Pack years: 37.00     Types: Cigarettes     Last attempt " to quit: 2020     Years since quittin.3    Smokeless tobacco: Never Used    Tobacco comment: Enrolled in the [employer] on 5/3/14 ([facility] Member ID # [id number]). Ambulatory referral to Smoking Cessation Program   Substance and Sexual Activity    Alcohol use: No     Alcohol/week: 0.0 standard drinks    Drug use: No    Sexual activity: Yes     Partners: Male       Review of Systems   Constitutional:  Negative for appetite change and fever.   HENT:  Negative for congestion, rhinorrhea and sore throat.    Eyes:  Negative for photophobia and visual disturbance.   Respiratory:  Negative for cough and shortness of breath.    Cardiovascular:  Positive for leg swelling. Negative for chest pain.   Gastrointestinal:  Negative for abdominal pain, constipation, diarrhea, nausea and vomiting.   Genitourinary:  Negative for dysuria and hematuria.   Musculoskeletal:  Positive for arthralgias.   Skin:  Positive for color change and wound.   Neurological:  Negative for speech difficulty, weakness and headaches.   Psychiatric/Behavioral:  Positive for agitation and behavioral problems. Negative for confusion and suicidal ideas. The patient is nervous/anxious.    Objective:     Vital Signs (Most Recent):  Temp: 97.3 °F (36.3 °C) (22 1155)  Pulse: (!) 112 (22 1622)  Resp: 20 (22 1622)  BP: 131/61 (22 1155)  SpO2: 100 % (22 1622)   Vital Signs (24h Range):  Temp:  [96.2 °F (35.7 °C)-98.7 °F (37.1 °C)] 97.3 °F (36.3 °C)  Pulse:  [] 112  Resp:  [17-20] 20  SpO2:  [92 %-100 %] 100 %  BP: (127-171)/(57-71) 131/61     Weight: 107.8 kg (237 lb 10.5 oz)  Body mass index is 39.55 kg/m².  Body surface area is 2.22 meters squared.      Intake/Output Summary (Last 24 hours) at 2022 1629  Last data filed at 2022 1347  Gross per 24 hour   Intake 738.79 ml   Output 3900 ml   Net -3161.21 ml       Physical Exam  Vitals and nursing note reviewed.   Constitutional:       General: She is not in  acute distress.     Appearance: She is obese. She is not toxic-appearing.   HENT:      Head: Normocephalic and atraumatic.      Nose: Nose normal.      Mouth/Throat:      Mouth: Mucous membranes are moist.   Eyes:      Pupils: Pupils are equal, round, and reactive to light.   Cardiovascular:      Rate and Rhythm: Regular rhythm. Tachycardia present.      Pulses: Normal pulses.      Heart sounds: Normal heart sounds.   Pulmonary:      Effort: Pulmonary effort is normal.      Breath sounds: Normal breath sounds.   Abdominal:      General: Bowel sounds are normal.      Palpations: Abdomen is soft.   Musculoskeletal:         General: Tenderness present. Normal range of motion.      Cervical back: Normal range of motion.      Right lower leg: No edema.      Left lower leg: No edema.   Skin:     General: Skin is warm and dry.      Comments: Left leg in walking boot and both feet wrapped     Neurological:      Mental Status: She is alert and oriented to person, place, and time.   Psychiatric:         Behavior: Behavior normal.         Thought Content: Thought content normal.       Significant Labs:   All pertinent labs from the last 24 hours have been reviewed.    Diagnostic Results:  I have reviewed all pertinent imaging results/findings within the past 24 hours.

## 2022-04-21 NOTE — PROGRESS NOTES
Pharmacokinetic Assessment Follow Up: IV Vancomycin    Vancomycin serum concentration assessment(s):    The trough level was drawn correctly and can be used to guide therapy at this time. The measurement is below the desired definitive target range of 10 to 15 mcg/mL.    Vancomycin Regimen Plan:    Change regimen to Vancomycin 1250 mg IV every 12 hours with next serum trough concentration measured at 1400 prior to 4th dose on 4/22    Drug levels (last 3 results):  Recent Labs   Lab Result Units 04/20/22  1540 04/21/22  1333   Vancomycin, Random ug/mL 30.3  --    Vancomycin-Trough ug/mL  --  5.3*       Pharmacy will continue to follow and monitor vancomycin.    Please contact pharmacy at extension [phone] for questions regarding this assessment.    Thank you for the consult,   Rudi Arredondo       Patient brief summary:  Audrey Natarajan is a 51 y.o. female initiated on antimicrobial therapy with IV Vancomycin for treatment of skin & soft tissue infection    The patient's current regimen is vancomycin 1.25gram IV q12h    Drug Allergies:   Review of patient's allergies indicates:   Allergen Reactions    Morphine Other (See Comments)     Patient had a psychotic episode after taking Morphine  Agitation, hallucinations    Penicillins Anaphylaxis     itching    Januvia [sitagliptin] Hives       Actual Body Weight:   107.8kg    Renal Function:   Estimated Creatinine Clearance: 83 mL/min (based on SCr of 1 mg/dL).,     Dialysis Method (if applicable):      CBC (last 72 hours):  Recent Labs   Lab Result Units 04/19/22  0512 04/20/22  1312 04/21/22  0714   WBC K/uL 18.05* 17.68* 15.65*   Hemoglobin g/dL 10.7* 10.5* 10.9*   Hematocrit % 35.4* 32.4* 34.3*   Platelets K/uL 536* 746* 807*   Gran % % 78.0* 58.4 64.0   Lymph % % 15.0* 22.1 19.3   Mono % % 1.0* 12.6 11.1   Eosinophil % % 1.0 2.3 1.9   Basophil % % 2.0* 0.5 0.6   Differential Method  Manual Automated Automated       Metabolic Panel (last 72 hours):  Recent Labs   Lab  Result Units 04/19/22  0512 04/19/22  1745 04/20/22  0501 04/20/22  1345 04/21/22  0714 04/21/22  1333   Sodium mmol/L 134*  --  130*  --  130* 132*   Sodium, Urine mmol/L  --   --   --  41  41  --   --    Potassium mmol/L 5.5* 4.6 5.4*  --  6.7* 5.7*   Chloride mmol/L 97  --  99  --  95 96   CO2 mmol/L 23  --  15*  --  19* 24   Glucose mg/dL 242*  --  160*  --  208* 258*   BUN mg/dL 29*  --  49*  --  48* 41*   Creatinine mg/dL 1.0  --  2.4*  --  1.3 1.0   Creatinine, Urine mg/dL  --   --   --  48.5  48.5  48.5  --   --    Albumin g/dL 3.4*  --  2.9*  --  3.6 3.0*   Total Bilirubin mg/dL 0.2  --  0.2  --   --   --    Alkaline Phosphatase U/L 87  --  61  --   --   --    AST U/L 14  --  22  --   --   --    ALT U/L 25  --  18  --   --   --    Phosphorus mg/dL  --   --   --   --  3.9 3.0       Vancomycin Administrations:  vancomycin given in the last 96 hours                     vancomycin (VANCOCIN) 2,000 mg in dextrose 5 % 500 mL IVPB (mg) 2,000 mg New Bag 04/20/22 1436                    Microbiologic Results:  Microbiology Results (last 7 days)       Procedure Component Value Units Date/Time    Blood culture [[id number]] Collected: 04/18/22 1946    Order Status: Completed Specimen: Blood from Antecubital, Left Arm Updated: 04/21/22 0612     Blood Culture, Routine No Growth to date      No Growth to date      No Growth to date    Blood culture [[id number]] Collected: 04/18/22 1946    Order Status: Completed Specimen: Blood from Antecubital, Right Arm Updated: 04/21/22 0612     Blood Culture, Routine No Growth to date      No Growth to date      No Growth to date    Culture, Anaerobe [[id number]]  (Abnormal) Collected: 04/13/22 1234    Order Status: Completed Specimen: Wound from Foot, Left Updated: 04/18/22 1226     Anaerobic Culture FINEGOLDIA MAGNA  Moderate      Blood culture [[id number]] Collected: 04/13/22 0309    Order Status: Completed Specimen: Blood from Peripheral, Antecubital, Left Updated: 04/18/22 1212      Blood Culture, Routine No growth after 5 days.    Blood culture [[id number]] Collected: 04/13/22 0309    Order Status: Completed Specimen: Blood from Peripheral, Antecubital, Left Updated: 04/18/22 1212     Blood Culture, Routine No growth after 5 days.    Aerobic culture [[id number]]  (Abnormal)  (Susceptibility) Collected: 04/13/22 1234    Order Status: Completed Specimen: Wound from Foot, Left Updated: 04/16/22 1201     Aerobic Bacterial Culture METHICILLIN RESISTANT STAPHYLOCOCCUS AUREUS  Many  Skin rosenda also present

## 2022-04-21 NOTE — ASSESSMENT & PLAN NOTE
-she has history of chronic thrombocytosis which is reactive and secondary to history of splenectomy  -thrombocytosis he is currently worsening and is secondary to sepsis  -hence no further workup needed  -okay to monitor platelet count once every 3 to 4 days

## 2022-04-21 NOTE — ASSESSMENT & PLAN NOTE
Chronic, controlled.  Latest blood pressure and vitals reviewed-   Temp:  [96.2 °F (35.7 °C)-98.7 °F (37.1 °C)]   Pulse:  []   Resp:  [17-20]   BP: (127-171)/(57-71)   SpO2:  [92 %-100 %] .   Home meds for hypertension were reviewed and noted below.   Hypertension Medications             furosemide (LASIX) 20 MG tablet TAKE 1 TABLET(20 MG) BY MOUTH EVERY DAY    lisinopriL 10 MG tablet Take 1 tablet (10 mg total) by mouth once daily.    metoprolol tartrate (LOPRESSOR) 50 MG tablet TAKE 1 TABLET(50 MG) BY MOUTH TWICE DAILY        While in the hospital, will manage blood pressure as follows; Adjust home antihypertensive regimen as follows- Hold lisinopril and lasix in the setting of ODESSA, resume lopressor    Will utilize p.r.n. blood pressure medication only if patient's blood pressure greater than  180/110 and she develops symptoms such as worsening chest pain or shortness of breath.

## 2022-04-21 NOTE — SUBJECTIVE & OBJECTIVE
"Interval History: patient seen earlier today - no new complaints. Creatinine increased to 2.4 today - possibly due to bactrim - discussed with primary team and pharmacy and changed antibiotics from bactrim to vanco and continued the flagyl; CT abdomen and pelvis positive for panniculitis and lymphadenopathy, and splenectomy (old)    Review of Systems   Respiratory:  Negative for shortness of breath.    Gastrointestinal:  Negative for diarrhea, nausea and vomiting.   Psychiatric/Behavioral:  Positive for behavioral problems.    Antibiotics (From admission, onward)                Start     Stop Route Frequency Ordered    04/21/22 1030  vancomycin 1.25 g in dextrose 5% 250 mL IVPB (ready to mix)         -- IV Every 24 hours (non-standard times) 04/20/22 0931    04/20/22 1030  mupirocin 2 % ointment         04/25 0859 Nasl 2 times daily 04/20/22 0926    04/20/22 1025  vancomycin - pharmacy to dose  (vancomycin IVPB)        "And" Linked Group Details    -- IV pharmacy to manage frequency 04/20/22 0925 04/18/22 1930  metroNIDAZOLE tablet 500 mg         -- Oral Every 8 hours 04/18/22 1929           Objective:     Vital Signs (Most Recent):  Temp: 96.2 °F (35.7 °C) (04/20/22 1901)  Pulse: (!) 123 (04/20/22 1911)  Resp: 18 (04/20/22 1946)  BP: (!) 130/59 (04/20/22 1901)  SpO2: 98 % (04/20/22 1911)   Vital Signs (24h Range):  Temp:  [96.2 °F (35.7 °C)-97.8 °F (36.6 °C)] 96.2 °F (35.7 °C)  Pulse:  [] 123  Resp:  [17-20] 18  SpO2:  [95 %-100 %] 98 %  BP: ()/(55-69) 130/59     Weight: 107.8 kg (237 lb 10.5 oz)  Body mass index is 39.55 kg/m².    Estimated Creatinine Clearance: 34.6 mL/min (A) (based on SCr of 2.4 mg/dL (H)).    Physical Exam  Cardiovascular:      Heart sounds: Normal heart sounds.   Pulmonary:      Breath sounds: Normal breath sounds.   Abdominal:      General: Bowel sounds are normal. There is no distension.      Palpations: Abdomen is soft.      Tenderness: There is no abdominal tenderness.      " Comments: Has lower abdominal ecchymoses and indurated areas at sites of injections on right and left lower pannus   Musculoskeletal:      Right lower leg: No edema.      Left lower leg: No edema.      Comments: Left leg in walking boot and both feet wrapped        Significant Labs: Blood Culture:   Recent Labs   Lab 22  0309 22  1946   LABBLOO No growth after 5 days.  No growth after 5 days. No Growth to date  No Growth to date  No Growth to date  No Growth to date     CBC:   Recent Labs   Lab 22  0512 22  1312   WBC 18.05* 17.68*   HGB 10.7* 10.5*   HCT 35.4* 32.4*   * 746*     CMP:   Recent Labs   Lab 22  0512 22  1745 22  0501   *  --  130*   K 5.5* 4.6 5.4*   CL 97  --  99   CO2 23  --  15*   *  --  160*   BUN 29*  --  49*   CREATININE 1.0  --  2.4*   CALCIUM 10.5  --  10.2   PROT 7.9  --  6.3   ALBUMIN 3.4*  --  2.9*   BILITOT 0.2  --  0.2   ALKPHOS 87  --  61   AST 14  --  22   ALT 25  --  18   ANIONGAP 14  --  16   EGFRNONAA >60  --  23*       Urine Studies:   Recent Labs   Lab 22  0143   COLORU Yellow   APPEARANCEUA Clear   PHUR 6.0   SPECGRAV 1.015   PROTEINUA Negative   GLUCUA 1+*   KETONESU Negative   BILIRUBINUA Negative   OCCULTUA Negative   NITRITE Negative   UROBILINOGEN Negative   LEUKOCYTESUR Negative     Wound Culture:   Recent Labs   Lab 22  1234   LABAERO METHICILLIN RESISTANT STAPHYLOCOCCUS AUREUS  Many  Skin rosenda also present  *       Significant Imagin/20 bilateral foot x rays - Left-sided Charcot foot and diffuse bilateral soft tissue edema.  Similar findings were present on 2022.     ct abdomen and pelvis - Impression:  No acute intra-abdominal process  Iliofemoral lymphadenopathy bilaterally could be reactive or neoplastic in nature and clinical follow-up is suggested  Lower abdominal wall panniculitis/cellulitis without large hematoma or other fluid collection  Splenectomy and colonic  diverticulosis

## 2022-04-21 NOTE — SUBJECTIVE & OBJECTIVE
Interval History: Tachycardic to 120s with other VSS. Now a Judicial Commitment Petition Filed Status (filed today) due to continued grave disability 2/2 mental illness at this time (will have court paperwork scanned into chart once received from the hospital ). On IV vancomycin. ID and Podiatry following left foot wound. Nephrology following ODESSA and hyperkalemia. Heme/Onc consulted. Psych following.     Review of Systems   Constitutional:  Negative for appetite change and fever.   HENT:  Negative for congestion, rhinorrhea and sore throat.    Eyes:  Negative for photophobia and visual disturbance.   Respiratory:  Negative for cough and shortness of breath.    Cardiovascular:  Positive for leg swelling. Negative for chest pain.   Gastrointestinal:  Negative for abdominal pain, constipation, diarrhea, nausea and vomiting.   Genitourinary:  Negative for dysuria and hematuria.   Musculoskeletal:  Positive for arthralgias.   Skin:  Positive for color change and wound.   Neurological:  Negative for speech difficulty, weakness and headaches.   Psychiatric/Behavioral:  Positive for agitation and behavioral problems. Negative for confusion and suicidal ideas. The patient is nervous/anxious.    Objective:     Vital Signs (Most Recent):  Temp: 97.3 °F (36.3 °C) (04/21/22 1155)  Pulse: 108 (04/21/22 1155)  Resp: 20 (04/21/22 1155)  BP: 131/61 (04/21/22 1155)  SpO2: 100 % (04/21/22 1155) Vital Signs (24h Range):  Temp:  [96.2 °F (35.7 °C)-98.7 °F (37.1 °C)] 97.3 °F (36.3 °C)  Pulse:  [] 108  Resp:  [17-20] 20  SpO2:  [92 %-100 %] 100 %  BP: (127-171)/(57-71) 131/61     Weight: 107.8 kg (237 lb 10.5 oz)  Body mass index is 39.55 kg/m².    Intake/Output Summary (Last 24 hours) at 4/21/2022 7305  Last data filed at 4/21/2022 1347  Gross per 24 hour   Intake 738.79 ml   Output 3900 ml   Net -3161.21 ml      Physical Exam  Vitals and nursing note reviewed.   Constitutional:       General: She is not in acute  distress.     Appearance: She is obese. She is not toxic-appearing.   HENT:      Head: Normocephalic and atraumatic.      Nose: Nose normal.      Mouth/Throat:      Mouth: Mucous membranes are moist.   Eyes:      Pupils: Pupils are equal, round, and reactive to light.   Cardiovascular:      Rate and Rhythm: Regular rhythm. Tachycardia present.      Pulses: Normal pulses.      Heart sounds: Normal heart sounds.   Pulmonary:      Effort: Pulmonary effort is normal.      Breath sounds: Normal breath sounds.   Abdominal:      General: Bowel sounds are normal.      Palpations: Abdomen is soft.   Musculoskeletal:         General: Tenderness present. Normal range of motion.      Cervical back: Normal range of motion.      Right lower leg: No edema.      Left lower leg: No edema.   Skin:     General: Skin is warm and dry.      Comments: Left leg in walking boot and both feet wrapped     Neurological:      Mental Status: She is alert and oriented to person, place, and time.   Psychiatric:         Behavior: Behavior normal.         Thought Content: Thought content normal.       Significant Labs: All pertinent labs within the past 24 hours have been reviewed.    BMP:   Recent Labs   Lab 04/21/22  1333   *   *   K 5.7*   CL 96   CO2 24   BUN 41*   CREATININE 1.0   CALCIUM 10.4     CBC:   Recent Labs   Lab 04/20/22  1312 04/21/22  0714 04/21/22  1431   WBC 17.68* 15.65*  --    HGB 10.5* 10.9*  --    HCT 32.4* 34.3* 32*   * 807*  --      CMP:   Recent Labs   Lab 04/20/22  0501 04/21/22  0714 04/21/22  1333   * 130* 132*   K 5.4* 6.7* 5.7*   CL 99 95 96   CO2 15* 19* 24   * 208* 258*   BUN 49* 48* 41*   CREATININE 2.4* 1.3 1.0   CALCIUM 10.2 11.4* 10.4   PROT 6.3  --   --    ALBUMIN 2.9* 3.6 3.0*   BILITOT 0.2  --   --    ALKPHOS 61  --   --    AST 22  --   --    ALT 18  --   --    ANIONGAP 16 16 12   EGFRNONAA 23* 48* >60       Significant Imaging: I have reviewed all pertinent imaging  results/findings within the past 24 hours.

## 2022-04-21 NOTE — ASSESSMENT & PLAN NOTE
No history of CKD  Creatinine baseline around 0.6-0.7, developed during admission likely secondary to bactrim use, increased to 2.4 and trended down to 1.0  US kidney showed renal US showed minimally dilated central renal collecting system on the left and mildly elevated resistive indices, findings which may be seen in setting of medical renal disease    - Daily Renal Function Panel  - Check Vanc levels before each dose  - Need strict I&Os  - Hold off on serological work up for now,  get basic pending labs today   - No Indication for RRT at this time, K+ acceptable, No Uremia symptoms.  - Avoid Hypotension.  - Renally dose all meds  - Please avoid nephrotoxins, including NSAIDs, aminoglycosides, IV contrast (unless absolutely necessary), gadolinium, fleets and other phosphorous-based laxatives. Caution with antibiotics

## 2022-04-22 LAB
ALBUMIN SERPL BCP-MCNC: 3.1 G/DL (ref 3.5–5.2)
ALP SERPL-CCNC: 69 U/L (ref 55–135)
ALT SERPL W/O P-5'-P-CCNC: 14 U/L (ref 10–44)
ANION GAP SERPL CALC-SCNC: 11 MMOL/L (ref 8–16)
AST SERPL-CCNC: 11 U/L (ref 10–40)
BASOPHILS # BLD AUTO: 0.06 K/UL (ref 0–0.2)
BASOPHILS NFR BLD: 0.5 % (ref 0–1.9)
BILIRUB SERPL-MCNC: 0.3 MG/DL (ref 0.1–1)
BUN SERPL-MCNC: 26 MG/DL (ref 6–20)
CALCIUM SERPL-MCNC: 10.8 MG/DL (ref 8.7–10.5)
CHLORIDE SERPL-SCNC: 96 MMOL/L (ref 95–110)
CO2 SERPL-SCNC: 27 MMOL/L (ref 23–29)
CREAT SERPL-MCNC: 0.8 MG/DL (ref 0.5–1.4)
DIFFERENTIAL METHOD: ABNORMAL
EOSINOPHIL # BLD AUTO: 0.3 K/UL (ref 0–0.5)
EOSINOPHIL NFR BLD: 2.3 % (ref 0–8)
ERYTHROCYTE [DISTWIDTH] IN BLOOD BY AUTOMATED COUNT: 15.7 % (ref 11.5–14.5)
EST. GFR  (AFRICAN AMERICAN): >60 ML/MIN/1.73 M^2
EST. GFR  (NON AFRICAN AMERICAN): >60 ML/MIN/1.73 M^2
GLUCOSE SERPL-MCNC: 231 MG/DL (ref 70–110)
HCT VFR BLD AUTO: 30.4 % (ref 37–48.5)
HGB BLD-MCNC: 10 G/DL (ref 12–16)
IMM GRANULOCYTES # BLD AUTO: 0.29 K/UL (ref 0–0.04)
IMM GRANULOCYTES NFR BLD AUTO: 2.2 % (ref 0–0.5)
LYMPHOCYTES # BLD AUTO: 2.9 K/UL (ref 1–4.8)
LYMPHOCYTES NFR BLD: 22.2 % (ref 18–48)
MCH RBC QN AUTO: 27.6 PG (ref 27–31)
MCHC RBC AUTO-ENTMCNC: 32.9 G/DL (ref 32–36)
MCV RBC AUTO: 84 FL (ref 82–98)
MONOCYTES # BLD AUTO: 1.7 K/UL (ref 0.3–1)
MONOCYTES NFR BLD: 12.7 % (ref 4–15)
NEUTROPHILS # BLD AUTO: 8 K/UL (ref 1.8–7.7)
NEUTROPHILS NFR BLD: 60.1 % (ref 38–73)
NRBC BLD-RTO: 0 /100 WBC
PLATELET # BLD AUTO: 806 K/UL (ref 150–450)
PMV BLD AUTO: 10.1 FL (ref 9.2–12.9)
POCT GLUCOSE: 234 MG/DL (ref 70–110)
POCT GLUCOSE: 236 MG/DL (ref 70–110)
POCT GLUCOSE: 243 MG/DL (ref 70–110)
POCT GLUCOSE: 333 MG/DL (ref 70–110)
POTASSIUM SERPL-SCNC: 4.9 MMOL/L (ref 3.5–5.1)
PROT SERPL-MCNC: 7.2 G/DL (ref 6–8.4)
RBC # BLD AUTO: 3.62 M/UL (ref 4–5.4)
SODIUM SERPL-SCNC: 134 MMOL/L (ref 136–145)
VANCOMYCIN TROUGH SERPL-MCNC: 10.7 UG/ML (ref 10–22)
WBC # BLD AUTO: 13.21 K/UL (ref 3.9–12.7)

## 2022-04-22 PROCEDURE — 27000207 HC ISOLATION

## 2022-04-22 PROCEDURE — 25000003 PHARM REV CODE 250

## 2022-04-22 PROCEDURE — 25000003 PHARM REV CODE 250: Performed by: HOSPITALIST

## 2022-04-22 PROCEDURE — 25000003 PHARM REV CODE 250: Performed by: PSYCHIATRY & NEUROLOGY

## 2022-04-22 PROCEDURE — 94640 AIRWAY INHALATION TREATMENT: CPT

## 2022-04-22 PROCEDURE — 99233 SBSQ HOSP IP/OBS HIGH 50: CPT | Mod: AF,HB,, | Performed by: PSYCHIATRY & NEUROLOGY

## 2022-04-22 PROCEDURE — 80202 ASSAY OF VANCOMYCIN: CPT | Performed by: HOSPITALIST

## 2022-04-22 PROCEDURE — S4991 NICOTINE PATCH NONLEGEND: HCPCS | Performed by: HOSPITALIST

## 2022-04-22 PROCEDURE — 99232 PR SUBSEQUENT HOSPITAL CARE,LEVL II: ICD-10-PCS | Mod: ,,, | Performed by: INTERNAL MEDICINE

## 2022-04-22 PROCEDURE — 80053 COMPREHEN METABOLIC PANEL: CPT

## 2022-04-22 PROCEDURE — 90833 PR PSYCHOTHERAPY W/PATIENT W/E&M, 30 MIN (ADD ON): ICD-10-PCS | Mod: AF,HB,, | Performed by: PSYCHIATRY & NEUROLOGY

## 2022-04-22 PROCEDURE — 99233 PR SUBSEQUENT HOSPITAL CARE,LEVL III: ICD-10-PCS | Mod: AF,HB,, | Performed by: PSYCHIATRY & NEUROLOGY

## 2022-04-22 PROCEDURE — 94761 N-INVAS EAR/PLS OXIMETRY MLT: CPT

## 2022-04-22 PROCEDURE — 25000003 PHARM REV CODE 250: Performed by: NURSE PRACTITIONER

## 2022-04-22 PROCEDURE — 11000001 HC ACUTE MED/SURG PRIVATE ROOM

## 2022-04-22 PROCEDURE — 99232 SBSQ HOSP IP/OBS MODERATE 35: CPT | Mod: ,,, | Performed by: INTERNAL MEDICINE

## 2022-04-22 PROCEDURE — 85025 COMPLETE CBC W/AUTO DIFF WBC: CPT

## 2022-04-22 PROCEDURE — 90833 PSYTX W PT W E/M 30 MIN: CPT | Mod: AF,HB,, | Performed by: PSYCHIATRY & NEUROLOGY

## 2022-04-22 PROCEDURE — 36415 COLL VENOUS BLD VENIPUNCTURE: CPT

## 2022-04-22 PROCEDURE — 25000003 PHARM REV CODE 250: Performed by: STUDENT IN AN ORGANIZED HEALTH CARE EDUCATION/TRAINING PROGRAM

## 2022-04-22 PROCEDURE — 36415 COLL VENOUS BLD VENIPUNCTURE: CPT | Performed by: HOSPITALIST

## 2022-04-22 PROCEDURE — 63600175 PHARM REV CODE 636 W HCPCS: Performed by: HOSPITALIST

## 2022-04-22 RX ORDER — METOPROLOL TARTRATE 50 MG/1
50 TABLET ORAL 2 TIMES DAILY
Status: DISCONTINUED | OUTPATIENT
Start: 2022-04-22 | End: 2022-04-26 | Stop reason: HOSPADM

## 2022-04-22 RX ORDER — OLANZAPINE 10 MG/2ML
10 INJECTION, POWDER, FOR SOLUTION INTRAMUSCULAR EVERY 8 HOURS PRN
Status: DISCONTINUED | OUTPATIENT
Start: 2022-04-23 | End: 2022-04-26 | Stop reason: HOSPADM

## 2022-04-22 RX ADMIN — METOPROLOL TARTRATE 50 MG: 50 TABLET, FILM COATED ORAL at 11:04

## 2022-04-22 RX ADMIN — DIVALPROEX SODIUM 1250 MG: 250 TABLET, DELAYED RELEASE ORAL at 09:04

## 2022-04-22 RX ADMIN — MUPIROCIN: 20 OINTMENT TOPICAL at 09:04

## 2022-04-22 RX ADMIN — GABAPENTIN 300 MG: 300 CAPSULE ORAL at 08:04

## 2022-04-22 RX ADMIN — FLUTICASONE FUROATE AND VILANTEROL TRIFENATATE 1 PUFF: 100; 25 POWDER RESPIRATORY (INHALATION) at 07:04

## 2022-04-22 RX ADMIN — METRONIDAZOLE 500 MG: 500 TABLET ORAL at 09:04

## 2022-04-22 RX ADMIN — RISPERIDONE 2 MG: 1 TABLET, ORALLY DISINTEGRATING ORAL at 08:04

## 2022-04-22 RX ADMIN — INSULIN ASPART 8 UNITS: 100 INJECTION, SOLUTION INTRAVENOUS; SUBCUTANEOUS at 04:04

## 2022-04-22 RX ADMIN — APIXABAN 10 MG: 5 TABLET, FILM COATED ORAL at 08:04

## 2022-04-22 RX ADMIN — VANCOMYCIN HYDROCHLORIDE 1250 MG: 1.25 INJECTION, POWDER, LYOPHILIZED, FOR SOLUTION INTRAVENOUS at 02:04

## 2022-04-22 RX ADMIN — HYDROXYZINE PAMOATE 50 MG: 25 CAPSULE ORAL at 09:04

## 2022-04-22 RX ADMIN — VANCOMYCIN HYDROCHLORIDE 1250 MG: 1.25 INJECTION, POWDER, LYOPHILIZED, FOR SOLUTION INTRAVENOUS at 03:04

## 2022-04-22 RX ADMIN — GABAPENTIN 300 MG: 300 CAPSULE ORAL at 09:04

## 2022-04-22 RX ADMIN — MICONAZOLE NITRATE 2 % TOPICAL POWDER: at 09:04

## 2022-04-22 RX ADMIN — METOPROLOL TARTRATE 50 MG: 50 TABLET, FILM COATED ORAL at 09:04

## 2022-04-22 RX ADMIN — INSULIN DETEMIR 10 UNITS: 100 INJECTION, SOLUTION SUBCUTANEOUS at 09:04

## 2022-04-22 RX ADMIN — FAMOTIDINE 20 MG: 20 TABLET ORAL at 08:04

## 2022-04-22 RX ADMIN — METRONIDAZOLE 500 MG: 500 TABLET ORAL at 06:04

## 2022-04-22 RX ADMIN — METRONIDAZOLE 500 MG: 500 TABLET ORAL at 03:04

## 2022-04-22 RX ADMIN — NICOTINE 1 PATCH: 21 PATCH, EXTENDED RELEASE TRANSDERMAL at 09:04

## 2022-04-22 RX ADMIN — DIVALPROEX SODIUM 500 MG: 250 TABLET, DELAYED RELEASE ORAL at 08:04

## 2022-04-22 RX ADMIN — INSULIN ASPART 2 UNITS: 100 INJECTION, SOLUTION INTRAVENOUS; SUBCUTANEOUS at 09:04

## 2022-04-22 RX ADMIN — ASPIRIN 81 MG CHEWABLE TABLET 81 MG: 81 TABLET CHEWABLE at 08:04

## 2022-04-22 RX ADMIN — HYDROXYZINE PAMOATE 50 MG: 25 CAPSULE ORAL at 08:04

## 2022-04-22 RX ADMIN — Medication 400 MG: at 09:04

## 2022-04-22 RX ADMIN — PRAVASTATIN SODIUM 40 MG: 40 TABLET ORAL at 09:04

## 2022-04-22 RX ADMIN — RISPERIDONE 3 MG: 1 TABLET, ORALLY DISINTEGRATING ORAL at 09:04

## 2022-04-22 RX ADMIN — Medication 400 MG: at 08:04

## 2022-04-22 RX ADMIN — HYDROXYZINE PAMOATE 50 MG: 25 CAPSULE ORAL at 03:04

## 2022-04-22 RX ADMIN — APIXABAN 10 MG: 5 TABLET, FILM COATED ORAL at 09:04

## 2022-04-22 RX ADMIN — SODIUM BICARBONATE 1300 MG: 650 TABLET ORAL at 08:04

## 2022-04-22 NOTE — PROGRESS NOTES
[place] [facility]  Infectious Disease  Progress Note    Patient Name: Audrey Natarajan  MRN: [id number]  Admission Date: 4/12/2022  Length of Stay: 4 days  Attending Physician: Aniya Mcintosh MD  Primary Care Provider: Donaldo Pena MD    Isolation Status: Contact  Assessment/Plan:      * Diabetic foot ulcer associated with type 2 diabetes mellitus  50 y/o with psychiatric illness - currently under [facility], DM, DVT/PE history with IVC filter in place, s/p splenectomy; PCN allergy listed as anaphylaxis on chart;  Admitted with bilateral wounds to feet - left worse than right (left foot with charcot deformity) and recurrent DVTs both legs. Patient was given vancomycin X 1 in ED then clindamycin until 3/17 then now on bactrim and flagyl starting today. The left foot culture collected 4/13 positive for MRSA and finegoldia magna.      Rec  Bilateral DVT may be clouding the picture with the leg erythema (hard to tell difference in cellulitis vs DVT)   MRI without definitive evidence of osteo  Recommend updating Tdap  Continue vanco and flagyl for 14 days total - if discharged would switch vanco to po doxy  ID will sign off      Lymphadenopathy, inguinal  Heme Onc addressed this    Panniculitis  Panniculitis at injection sited for blood thinners - seem to be improving - has extensive ecchymoses on abdomen that are slowly healing          Thank you for your consult. I will sign off. Please contact us if you have any additional questions.    Shaggy Cerrato MD  Infectious Disease  [place] [facility]    Subjective:     Principal Problem:Diabetic foot ulcer associated with type 2 diabetes mellitus    HPI: 50 y/o with past medical history of ADHD (attention deficit hyperactivity disorder), Arthritis, Asthma, Bipolar 1 disorder, Cataract, COPD (chronic obstructive pulmonary disease), Coronary artery disease, Depression, Diabetes mellitus, DVT of lower extremity, bilateral, IVC filter in place; Encounter for blood transfusion,  History of blood clots, HTN (hypertension), Hypercholesteremia, Irregular heartbeat, Neuromuscular disorder, PE (pulmonary embolism), and Restless leg syndrome. Allergic to PCN - anaphylaxis and itching;     [place] [facility] [place] [facility] (currently under [facility]) for evaluation of myalgias, bilateral lower extremity swelling, bilateral foot pain, wounds to both feet, subjective fever, intermittent frontal headache. Patient [place] [facility] [place] and was diagnosed with cellulitis of both LE and bilateral foot lesions. Patient was given vanco in ED and than changed to clindamycin. Acute DVT of both LEs diagnosed 4/13 by US. L foot MRI on 4/14 positive for charcot foot, cellulitis and myositis. Podiatry saw patient 4/13/22 - right foot ulceration plantar aspect right distal forefoot and hallux - no signs of infection; left foot with charcot deformity, LLE with erythema and edema and ulcer to plantar left mid foot - was debrided - and extended to superficial fat layer and did not probe to bone.  BC 4/13 negative final. Left foot wound culture 4/13 positive for MRSA and finegoldia magna. Patient was given IV Vancomycin X 1 in ED on 4/12; then changed to clindamycin from 4/13 - 4/17 and then changed to bactrim and metronidazole on 4/18.     ID Consult 4/19 for help with antibiotics. Patient had WBC elevated to 31 on admit - it went down to 12 on 4/14 then increased to 18 on 4/18. Podiatry reassessing patient today. Patient seen and examined; she described serious reaction to penicillin in the past as listed on her chart. She had injured her left foot prior to admission. She describes abdominal ecchymoses from anticoagulation injections.   4/19 ct abdomen and pelvis - Iliofemoral lymphadenopathy bilaterally could be reactive or neoplastic in nature and clinical follow-up is suggested; Lower abdominal wall panniculitis/cellulitis without large hematoma or other fluid collection; Splenectomy  and colonic diverticulosis   4/20 creatinine increased to 2.4; bactrim stopped changed to vancomycin with close pharmacy monitoring  4/22 leukocytosis and cr improved        Interval History: No acute events    Review of Systems   Respiratory:  Negative for shortness of breath.    Gastrointestinal:  Negative for constipation, diarrhea, nausea and vomiting.   Objective:     Vital Signs (Most Recent):  Temp: 96.3 °F (35.7 °C) (04/22/22 1122)  Pulse: 93 (04/22/22 1122)  Resp: 19 (04/22/22 1122)  BP: (!) 121/58 (04/22/22 1122)  SpO2: 98 % (04/22/22 1122)   Vital Signs (24h Range):  Temp:  [96.2 °F (35.7 °C)-99.4 °F (37.4 °C)] 96.3 °F (35.7 °C)  Pulse:  [] 93  Resp:  [18-20] 19  SpO2:  [95 %-100 %] 98 %  BP: (121-192)/(58-81) 121/58     Weight: 107.8 kg (237 lb 10.5 oz)  Body mass index is 39.55 kg/m².    Estimated Creatinine Clearance: 103.8 mL/min (based on SCr of 0.8 mg/dL).    Physical Exam  Cardiovascular:      Heart sounds: Normal heart sounds.   Pulmonary:      Breath sounds: Normal breath sounds.   Abdominal:      General: Bowel sounds are normal. There is no distension.      Palpations: Abdomen is soft.      Tenderness: There is abdominal tenderness.      Comments: Still some tenderness at sites of abdominal injections and indurated areas; ecchymoses of lower abdomen persist    Musculoskeletal:      Right lower leg: No edema.      Left lower leg: No edema.      Comments: Both right and left leg erythema and edema has decreased       Significant Labs: All pertinent labs within the past 24 hours have been reviewed.    Significant Imaging: I have reviewed all pertinent imaging results/findings within the past 24 hours.

## 2022-04-22 NOTE — PROGRESS NOTES
Nephrology Progress Note       Consult Requested By: Aniya Mcintosh MD  Reason for Consult: ODESSA     SUBJECTIVE:      ?    Review of Systems   Constitutional: Negative for chills and fever.   HENT: Negative for congestion and sore throat.    Eyes: Negative for blurred vision, double vision and photophobia.   Respiratory: Negative for cough and shortness of breath.    Cardiovascular: Negative for chest pain, palpitations and leg swelling.   Gastrointestinal: Negative for abdominal pain, diarrhea, nausea and vomiting.   Genitourinary: Negative for dysuria and urgency.   Musculoskeletal: Negative for joint pain and myalgias.   Skin: Negative for itching and rash.   Neurological: Negative for dizziness, sensory change, weakness and headaches.   Endo/Heme/Allergies: Negative for polydipsia. Does not bruise/bleed easily.   Psychiatric/Behavioral: Positive for depression. The patient is nervous/anxious.        Past Medical History:   Diagnosis Date    ADHD (attention deficit hyperactivity disorder)     Arthritis     Asthma     Bipolar 1 disorder     Cataract     COPD (chronic obstructive pulmonary disease)     Coronary artery disease     A fib    Depression     bipolar manic depresson    Diabetes mellitus     DVT of lower extremity, bilateral July 2013    bilateral LE DVT. Citra filter placed.     Encounter for blood transfusion     History of blood clots 1. Left Leg=2003; 2.Bilateral Groin=Blood Clots= 5 or 6/ 2013 & 7/2013; 3. LLL of Lung=7/2013;  4. Lt. Lower Leg=7/2013.     Pt. had 1st Blood Clot after Dagywylqvucv=1022, & Last=2013. Citra Filter= Rt.Lateral Neck.    HTN (hypertension) 6/6/2013    Pt states that she does not have hypertension    Hypercholesteremia     Irregular heartbeat     Neuromuscular disorder     neuropathy feet    PE (pulmonary embolism) July 2013     bilat LE DVT.     Restless leg syndrome        OBJECTIVE:     Vital Signs (Most Recent)  Vitals:    04/22/22 0405  04/22/22 0513 04/22/22 0742 04/22/22 0755   BP:  (!) 149/68 128/67    BP Location:       Patient Position:   Sitting    Pulse: (!) 124 100 100 100   Resp:  20 19 18   Temp:  96.2 °F (35.7 °C) 96.3 °F (35.7 °C)    TempSrc:   Axillary    SpO2: 97% 95%  95%   Weight:       Height:             Date 04/22/22 0700 - 04/23/22 0659   Shift 4916-6743 1633-4498 9707-5724 24 Hour Total   INTAKE   Shift Total(mL/kg)       OUTPUT   Urine(mL/kg/hr) 300   300   Shift Total(mL/kg) 300(2.8)   300(2.8)   Weight (kg) 107.8 107.8 107.8 107.8           Medications:   apixaban  10 mg Oral BID    aspirin  81 mg Oral Daily    divalproex  1,250 mg Oral QHS    divalproex  500 mg Oral Daily    famotidine  20 mg Oral Daily    ferrous sulfate  1 tablet Oral Daily    fluticasone furoate-vilanteroL  1 puff Inhalation Daily    gabapentin  300 mg Oral BID    hydrOXYzine pamoate  50 mg Oral TID    insulin detemir U-100  10 Units Subcutaneous QHS    magnesium oxide  400 mg Oral BID    metoprolol tartrate  50 mg Oral BID    metroNIDAZOLE  500 mg Oral Q8H    miconazole NITRATE 2 %   Topical (Top) BID    mupirocin   Nasal BID    nicotine  1 patch Transdermal Daily    pravastatin  40 mg Oral QHS    risperiDONE  2 mg Oral Daily    risperiDONE  3 mg Oral Nightly    sodium bicarbonate  1,300 mg Oral BID    vancomycin (VANCOCIN) IVPB  1,250 mg Intravenous Q12H           Physical Exam  Vitals and nursing note reviewed.   Constitutional:       General: She is not in acute distress.     Appearance: She is obese. She is not diaphoretic.   HENT:      Head: Normocephalic and atraumatic.      Mouth/Throat:      Pharynx: No oropharyngeal exudate.   Eyes:      General: No scleral icterus.     Conjunctiva/sclera: Conjunctivae normal.      Pupils: Pupils are equal, round, and reactive to light.   Cardiovascular:      Rate and Rhythm: Normal rate and regular rhythm.      Heart sounds: Normal heart sounds. No murmur heard.  Pulmonary:      Effort:  Pulmonary effort is normal. No respiratory distress.      Breath sounds: Normal breath sounds.   Abdominal:      General: Bowel sounds are normal. There is no distension.      Palpations: Abdomen is soft.      Tenderness: There is no abdominal tenderness.   Musculoskeletal:         General: Normal range of motion.      Cervical back: Normal range of motion and neck supple.      Comments: Foot wound    Skin:     General: Skin is warm and dry.      Findings: No erythema.   Neurological:      Mental Status: She is alert and oriented to person, place, and time.      Cranial Nerves: No cranial nerve deficit.   Psychiatric:         Mood and Affect: Affect normal.         Cognition and Memory: Memory normal.         Judgment: Judgment normal.         Laboratory:  Recent Labs   Lab 04/20/22  1312 04/21/22  0714 04/21/22  1431 04/22/22  0450   WBC 17.68* 15.65*  --  13.21*   HGB 10.5* 10.9*  --  10.0*   HCT 32.4* 34.3* 32* 30.4*   * 807*  --  806*   MONO 12.6  2.2* 11.1  1.7*  --  12.7  1.7*     Recent Labs   Lab 04/17/22  1050 04/18/22  1246 04/21/22  0714 04/21/22  1333 04/22/22  0450   *   < > 130* 132* 134*   K 4.9   < > 6.7* 5.7* 4.9   CL 96   < > 95 96 96   CO2 24   < > 19* 24 27   BUN 13   < > 48* 41* 26*   CREATININE 0.7   < > 1.3 1.0 0.8   CALCIUM 10.1   < > 11.4* 10.4 10.8*   PHOS 4.3  --  3.9 3.0  --     < > = values in this interval not displayed.       Diagnostic Results:  X-Ray: Reviewed  US: Reviewed  Echo: Reviewed  ASSESSMENT/PLAN:     1. ODESSA - big jump from baseline 0.9 to 2.4 most likely  Due to Bactrim  - resolved   --   US kidney no obstruction      --   Vanc levels before each dose   Treat - infection, need strict I&O   -- Daily Renal Function Panel  -- Avoid Hypotension.  -- Renally dose all meds  -- Please avoid nephrotoxins, including NSAIDs, aminoglycosides, IV contrast (unless absolutely necessary), gadolinium, fleets and other phosphorous-based laxatives. Caution with  antibiotics.  2. Hyperkalemia  -  Resolved stop  lokelma     3. Acidosis -  PO bicarbonate  - resolved stop bicarb      Anemia ID - on PO iron   Recent Labs   Lab 04/20/22  1312 04/21/22  0714 04/21/22  1431 04/22/22  0450   HGB 10.5* 10.9*  --  10.0*   HCT 32.4* 34.3* 32* 30.4*   * 807*  --  806*       Iron   Lab Results   Component Value Date    IRON 19 (L) 04/21/2022    TIBC 401 04/21/2022    FERRITIN 84 04/13/2022       4. 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 Nutrition/Hypoalbuminemia (E88.09) -    Recent Labs   Lab 04/19/22  0640 04/20/22  0501 04/21/22  1333 04/22/22  0450   LABPROT 10.3  --   --   --    ALBUMIN  --    < > 3.0* 3.1*    < > = values in this interval not displayed.     Nepro with meals TID. Renal vitamins daily      Thank you for the consult, will follow  With any question please call [phone]  Madhuri Fong MD    [facility]  FARHAD Mai MD, FACP,   HECTOR Baer MD,   MD PADDY Fields MD E. V. Harmon, NP    [address]  [place], [address] [phone]

## 2022-04-22 NOTE — ASSESSMENT & PLAN NOTE
B/l foot ulcers  - Podiatry consulted,  - Xray in feet bilaterally, findings consistent with Charcot joint of left foot, no acute abnormality   - MRI ordered edema noted as well as charcot changes of left foot without evidence of osteomyelitis  - E ulcer debrided with deep cultures taken.   - Site dressed with xeroform and kerlix. Nursing orders in for daily dressing changes  - She is NWB to [facility] due to ulcer and history of charcot foot.  - Will continue to follow  - Cont clindamycin - culture growing Staph aureus--sensitive to clinda, switched   - Cultures grew out MRSA and anerobic bacteria; patient is septic today and rising WBC; switched clinda to bactrim and adding flagyl;       - Leukocytosis improving today  - Continue vancomycin and flagyl  - Follow up on ID and podiatry recs

## 2022-04-22 NOTE — ASSESSMENT & PLAN NOTE
Lymphadenopathy, inguinal  Identified on CT abdomen pelvis on 4/19, no underlying hematoma or abscess    - Heme/Onc consulted, recommendations as follows  - likely secondary to Panniculitis  - no further workup needed

## 2022-04-22 NOTE — ASSESSMENT & PLAN NOTE
Elevated platelets and increasing since admission, likely secondary to sepsis  Reports history of chronic thrombocytosis which is reactive and secondary to history of splenectomy    Heme/Onc consulted, recommendations as follows  -hence no further workup needed  -okay to monitor platelet count once every 3 to 4 days

## 2022-04-22 NOTE — ASSESSMENT & PLAN NOTE
No history of CKD  Creatinine baseline around 0.6-0.7, developed during admission likely secondary to bactrim use, increased to 2.4 and trended down to 1.0  US kidney showed renal US showed minimally dilated central renal collecting system on the left and mildly elevated resistive indices, findings which may be seen in setting of medical renal disease    - Currently resolved  - Daily Renal Function Panel  - Check Vanc levels before each dose  - Need strict I&Os  - Hold off on serological work up for now,  get basic pending labs today   - No Indication for RRT at this time, K+ acceptable, No Uremia symptoms.  - Avoid Hypotension.  - Renally dose all meds  - Please avoid nephrotoxins, including NSAIDs, aminoglycosides, IV contrast (unless absolutely necessary), gadolinium, fleets and other phosphorous-based laxatives. Caution with antibiotics

## 2022-04-22 NOTE — PROGRESS NOTES
[facility]  Infectious Disease  Progress Note    Patient Name: Audrey Natarajan  MRN: [id number]  Admission Date: 4/12/2022  Length of Stay: 3 days  Attending Physician: Aniya Mcintosh MD  Primary Care Provider: Donaldo Pena MD    Isolation Status: Contact  Assessment/Plan:      * Diabetic foot ulcer associated with type 2 diabetes mellitus  52 y/o with psychiatric illness - currently under [facility], DM, DVT/PE history with IVC filter in place, s/p splenectomy; PCN allergy listed as anaphylaxis on chart;  Admitted with bilateral wounds to feet - left worse than right (left foot with charcot deformity) and recurrent DVTs both legs. Patient was given vancomycin X 1 in ED then clindamycin until 3/17 then now on bactrim and flagyl starting today. The left foot culture collected 4/13 positive for MRSA and finegoldia magna. Patient's WBC on admit was 31 then decreased to 12 but now up again to 18 on 4/19.     Rec  Creatinine increased to 2.4 on 4/20  - stop bactrim and change to vancomycin; continue flagyl  Creatinine better on 4/21 = 1.0  Bilateral DVT may be clouding the picture with the leg erythema (hard to tell difference in cellulitis vs DVT)   Need Podiatry to reassess foot wounds if not already done  Recommend updating Tdap - last one was January 2014 per chart - patient described trauma to the feet as cause of the ulcerations - so recommend updating Tdap       Lymphadenopathy, inguinal  Heme Onc addressed this    Panniculitis  Panniculitis at injection sited for blood thinners - seem to be improving - has extensive ecchymoses on abdomen that are slowly healing        Anticipated Disposition: unclear at his point    Thank you for your consult. Dr. Cerrato will pick [facility] ID Service tomorrow [phone]    Edita José MD  Infectious Disease  [facility]    Subjective:     Principal Problem:Diabetic foot ulcer associated with type 2 diabetes mellitus    HPI: 52 y/o with past medical history of  ADHD (attention deficit hyperactivity disorder), Arthritis, Asthma, Bipolar 1 disorder, Cataract, COPD (chronic obstructive pulmonary disease), Coronary artery disease, Depression, Diabetes mellitus, DVT of lower extremity, bilateral, IVC filter in place; Encounter for blood transfusion, History of blood clots, HTN (hypertension), Hypercholesteremia, Irregular heartbeat, Neuromuscular disorder, PE (pulmonary embolism), and Restless leg syndrome. Allergic to PCN - anaphylaxis and itching;     [place] [facility] [place] [facility] (currently under [facility]) for evaluation of myalgias, bilateral lower extremity swelling, bilateral foot pain, wounds to both feet, subjective fever, intermittent frontal headache. [place] [facility] [place] and was diagnosed with cellulitis of both LE and bilateral foot lesions. Patient was given vanco in ED and than changed to clindamycin. Acute DVT of both LEs diagnosed 4/13 by US. L foot MRI on 4/14 positive for charcot foot, cellulitis and myositis. Podiatry saw patient 4/13/22 - right foot ulceration plantar aspect right distal forefoot and hallux - no signs of infection; left foot with charcot deformity, LLE with erythema and edema and ulcer to plantar left mid foot - was debrided - and extended to superficial fat layer and did not probe to bone.  BC 4/13 negative final. Left foot wound culture 4/13 positive for MRSA and finegoldia magna. Patient was given IV Vancomycin X 1 in ED on 4/12; then changed to clindamycin from 4/13 - 4/17 and then changed to bactrim and metronidazole on 4/18.     ID Consult 4/19 for help with antibiotics. Patient had WBC elevated to 31 on admit - it went down to 12 on 4/14 then increased to 18 on 4/18. Podiatry reassessing patient today. Patient seen and examined; she described serious reaction to penicillin in the past as listed on her chart. She had injured her left foot prior to admission. She describes abdominal ecchymoses  "from anticoagulation injections.   4/19 ct abdomen and pelvis - Iliofemoral lymphadenopathy bilaterally could be reactive or neoplastic in nature and clinical follow-up is suggested; Lower abdominal wall panniculitis/cellulitis without large hematoma or other fluid collection; Splenectomy and colonic diverticulosis   4/20 creatinine increased to 2.4; bactrim stopped changed to vancomycin with close pharmacy monitoring        Interval History: patient said her constipation has resolved and the abdominal nodules at sites of injections have decreased. She tolerated the ICV vancomycin. Heme Onc evaluated patient for lymphadenopathy. Vanco level was done after dose so level was 30 - Pharmacy getting new level    Review of Systems   Respiratory:  Negative for shortness of breath.    Gastrointestinal:  Negative for constipation, diarrhea, nausea and vomiting.   Antibiotics (From admission, onward)                Start     Stop Route Frequency Ordered    04/21/22 1500  vancomycin 1.25 g in dextrose 5% 250 mL IVPB (ready to mix)         -- IV Every 12 hours (non-standard times) 04/21/22 1404    04/20/22 1030  mupirocin 2 % ointment         04/25 0859 Nasl 2 times daily 04/20/22 0926    04/20/22 1025  vancomycin - pharmacy to dose  (vancomycin IVPB)        "And" Linked Group Details    -- IV pharmacy to manage frequency 04/20/22 0925    04/18/22 1930  metroNIDAZOLE tablet 500 mg         -- Oral Every 8 hours 04/18/22 1929           Objective:     Vital Signs (Most Recent):  Temp: 99.4 °F (37.4 °C) (04/21/22 2053)  Pulse: 104 (04/21/22 2053)  Resp: 20 (04/21/22 1650)  BP: (!) 148/65 (04/21/22 2053)  SpO2: 95 % (04/21/22 2053)   Vital Signs (24h Range):  Temp:  [96.3 °F (35.7 °C)-99.4 °F (37.4 °C)] 99.4 °F (37.4 °C)  Pulse:  [] 104  Resp:  [17-20] 20  SpO2:  [92 %-100 %] 95 %  BP: (129-192)/(57-81) 148/65     Weight: 107.8 kg (237 lb 10.5 oz)  Body mass index is 39.55 kg/m².    Estimated Creatinine Clearance: 83 mL/min " (based on SCr of 1 mg/dL).    Physical Exam  Cardiovascular:      Heart sounds: Normal heart sounds.   Pulmonary:      Breath sounds: Normal breath sounds.   Abdominal:      General: Bowel sounds are normal. There is no distension.      Palpations: Abdomen is soft.      Tenderness: There is abdominal tenderness.      Comments: Still some tenderness at sites of abdominal injections and indurated areas; ecchymoses of lower abdomen persist    Musculoskeletal:      Right lower leg: No edema.      Left lower leg: No edema.      Comments: Both right and left leg erythema and edema has decreased       Significant Labs: Blood Culture:   Recent Labs   Lab 22  0309 22  1946   LABBLOO No growth after 5 days.  No growth after 5 days. No Growth to date  No Growth to date  No Growth to date  No Growth to date  No Growth to date  No Growth to date     CBC:   Recent Labs   Lab 22  1312 22  0714 22  1431   WBC 17.68* 15.65*  --    HGB 10.5* 10.9*  --    HCT 32.4* 34.3* 32*   * 807*  --      CMP:   Recent Labs   Lab 22  0501 22  0714 22  1333   * 130* 132*   K 5.4* 6.7* 5.7*   CL 99 95 96   CO2 15* 19* 24   * 208* 258*   BUN 49* 48* 41*   CREATININE 2.4* 1.3 1.0   CALCIUM 10.2 11.4* 10.4   PROT 6.3  --   --    ALBUMIN 2.9* 3.6 3.0*   BILITOT 0.2  --   --    ALKPHOS 61  --   --    AST 22  --   --    ALT 18  --   --    ANIONGAP 16 16 12   EGFRNONAA 23* 48* >60       Urine Studies:   Recent Labs   Lab 22  0143   COLORU Yellow   APPEARANCEUA Clear   PHUR 6.0   SPECGRAV 1.015   PROTEINUA Negative   GLUCUA 1+*   KETONESU Negative   BILIRUBINUA Negative   OCCULTUA Negative   NITRITE Negative   UROBILINOGEN Negative   LEUKOCYTESUR Negative     Wound Culture:   Recent Labs   Lab 22  1234   LABAERO METHICILLIN RESISTANT STAPHYLOCOCCUS AUREUS  Many  Skin rosenda also present  *       Significant Imagin/21 renal US - Impression:  1. Minimally dilated  central renal collecting system on the left.  2. Mildly elevated resistive indices, findings which may be seen in setting of medical renal disease.  3. Additional details, as per the body of report

## 2022-04-22 NOTE — ASSESSMENT & PLAN NOTE
Bipolar 1 disorder    Per Psychiatry note,   - Patient is now in a Judicial Commitment Petition Filed Status (filed on 04/21/2022) due to continued grave disability 2/2 mental illness at this time (will have court paperwork scanned into chart once received from the hospital ).    - Once medically cleared / stable, seek transfer back to [facility] (or another inpatient psychiatric facility; patient may need Med-Psych placement) for continued mental health treatment / stabilization.  - Continue Risperdal 2 mg PO QAM & 3 mg PO QHS and Depakote ER 500 mg PO QAM and 1250 mg PO QHS for Bipolar I Disorder and insomnia   - Continue Vistaril 50 mg PO TID (change to scheduled) for anxiety and/or insomnia   - HOLD previously outpt prescribed Vyvanse  - Can use Zyprexa 10 mg PO/IM q8 hours PRN for non-redirectable psychotic / manic agitation   - Get repeat trough VPA level tomorrow (Saturday) AM;  - Continue suicide / violence precautions and continue to monitor the patient with a sitter while on a PEC / [facility] / CHANCE filed status.    Junior Castro MD pager [phone]

## 2022-04-22 NOTE — PLAN OF CARE
Please see pics in media, consult wound care .  Consult case management.  She will likely need placement     Plan of care reviewed. Most scheduled medications accepted with the exception of prn insulin, mupirocin ointment, miconazole powder and ferrous sulfate. Patient very tearful and agitated. POCT glucose 234 and 236. No acute distress noted. Will continue to monitor.

## 2022-04-22 NOTE — SUBJECTIVE & OBJECTIVE
"Interval History: patient said her constipation has resolved and the abdominal nodules at sites of injections have decreased. She tolerated the ICV vancomycin. Heme Onc evaluated patient for lymphadenopathy. Vanco level was done after dose so level was 30 - Pharmacy getting new level    Review of Systems   Respiratory:  Negative for shortness of breath.    Gastrointestinal:  Negative for constipation, diarrhea, nausea and vomiting.   Antibiotics (From admission, onward)                Start     Stop Route Frequency Ordered    04/21/22 1500  vancomycin 1.25 g in dextrose 5% 250 mL IVPB (ready to mix)         -- IV Every 12 hours (non-standard times) 04/21/22 1404    04/20/22 1030  mupirocin 2 % ointment         04/25 0859 Nasl 2 times daily 04/20/22 0926    04/20/22 1025  vancomycin - pharmacy to dose  (vancomycin IVPB)        "And" Linked Group Details    -- IV pharmacy to manage frequency 04/20/22 0925    04/18/22 1930  metroNIDAZOLE tablet 500 mg         -- Oral Every 8 hours 04/18/22 1929           Objective:     Vital Signs (Most Recent):  Temp: 99.4 °F (37.4 °C) (04/21/22 2053)  Pulse: 104 (04/21/22 2053)  Resp: 20 (04/21/22 1650)  BP: (!) 148/65 (04/21/22 2053)  SpO2: 95 % (04/21/22 2053)   Vital Signs (24h Range):  Temp:  [96.3 °F (35.7 °C)-99.4 °F (37.4 °C)] 99.4 °F (37.4 °C)  Pulse:  [] 104  Resp:  [17-20] 20  SpO2:  [92 %-100 %] 95 %  BP: (129-192)/(57-81) 148/65     Weight: 107.8 kg (237 lb 10.5 oz)  Body mass index is 39.55 kg/m².    Estimated Creatinine Clearance: 83 mL/min (based on SCr of 1 mg/dL).    Physical Exam  Cardiovascular:      Heart sounds: Normal heart sounds.   Pulmonary:      Breath sounds: Normal breath sounds.   Abdominal:      General: Bowel sounds are normal. There is no distension.      Palpations: Abdomen is soft.      Tenderness: There is abdominal tenderness.      Comments: Still some tenderness at sites of abdominal injections and indurated areas; ecchymoses of lower " abdomen persist    Musculoskeletal:      Right lower leg: No edema.      Left lower leg: No edema.      Comments: Both right and left leg erythema and edema has decreased       Significant Labs: Blood Culture:   Recent Labs   Lab 22  0309 22  1946   LABBLOO No growth after 5 days.  No growth after 5 days. No Growth to date  No Growth to date  No Growth to date  No Growth to date  No Growth to date  No Growth to date     CBC:   Recent Labs   Lab 22  1312 22  0714 22  1431   WBC 17.68* 15.65*  --    HGB 10.5* 10.9*  --    HCT 32.4* 34.3* 32*   * 807*  --      CMP:   Recent Labs   Lab 22  0501 22  0714 22  1333   * 130* 132*   K 5.4* 6.7* 5.7*   CL 99 95 96   CO2 15* 19* 24   * 208* 258*   BUN 49* 48* 41*   CREATININE 2.4* 1.3 1.0   CALCIUM 10.2 11.4* 10.4   PROT 6.3  --   --    ALBUMIN 2.9* 3.6 3.0*   BILITOT 0.2  --   --    ALKPHOS 61  --   --    AST 22  --   --    ALT 18  --   --    ANIONGAP 16 16 12   EGFRNONAA 23* 48* >60       Urine Studies:   Recent Labs   Lab 22  0143   COLORU Yellow   APPEARANCEUA Clear   PHUR 6.0   SPECGRAV 1.015   PROTEINUA Negative   GLUCUA 1+*   KETONESU Negative   BILIRUBINUA Negative   OCCULTUA Negative   NITRITE Negative   UROBILINOGEN Negative   LEUKOCYTESUR Negative     Wound Culture:   Recent Labs   Lab 22  1234   LABAERO METHICILLIN RESISTANT STAPHYLOCOCCUS AUREUS  Many  Skin rosenda also present  *       Significant Imagin/21 renal US - Impression:  1. Minimally dilated central renal collecting system on the left.  2. Mildly elevated resistive indices, findings which may be seen in setting of medical renal disease.  3. Additional details, as per the body of report

## 2022-04-22 NOTE — ASSESSMENT & PLAN NOTE
Chronic, controlled.  Latest blood pressure and vitals reviewed-   Temp:  [96.2 °F (35.7 °C)-99.4 °F (37.4 °C)]   Pulse:  []   Resp:  [18-20]   BP: (128-192)/(61-81)   SpO2:  [95 %-100 %] .   Home meds for hypertension were reviewed and noted below.   Hypertension Medications             furosemide (LASIX) 20 MG tablet TAKE 1 TABLET(20 MG) BY MOUTH EVERY DAY    lisinopriL 10 MG tablet Take 1 tablet (10 mg total) by mouth once daily.    metoprolol tartrate (LOPRESSOR) 50 MG tablet TAKE 1 TABLET(50 MG) BY MOUTH TWICE DAILY        While in the hospital, will manage blood pressure as follows; Adjust home antihypertensive regimen as follows- Hold lisinopril and lasix in the setting of ODESSA, resume lopressor    Will utilize p.r.n. blood pressure medication only if patient's blood pressure greater than  180/110 and she develops symptoms such as worsening chest pain or shortness of breath.

## 2022-04-22 NOTE — PLAN OF CARE
Plan of care reviewed with the patient. Scheduled medicines given and the patient tolerated well. Given Olanzapine 10 mg IM for restlessness and agitation. Fall and safety precautions taken. Acu check was 302 mg/dl, covered with 4 Units Insulin Aspart. No acute distress reported on the shift. Advised the patient to call for assistance. Continued monitoring the patient.

## 2022-04-22 NOTE — ASSESSMENT & PLAN NOTE
Panniculitis at injection sited for blood thinners - seem to be improving - has extensive ecchymoses on abdomen that are slowly healing

## 2022-04-22 NOTE — PLAN OF CARE
MELVI spoke with Neel with [facility] [phone] they will not be able to accept pt back due to pt's wound care needs exceeding their capacity. Pt was came to [facility] from [facility].    Melvi will send out more referrals.     [facility]    ANDREA Jacobo  [phone]    Future Appointments   Date Time Provider Department Center   4/28/2022  2:00 PM Donaldo Pena MD [facility] [place] FM IM [facility]   4/28/2022  2:30 PM Carley Palomo [facility] [place] SMOKE [place]   5/9/2022  2:00 PM Saloni De Anda MD [facility] Cli   5/26/2022  3:00 PM Maira De Los Santos DPM [facility] POD [place]        04/22/22 1156   Post-Acute Status   Post-Acute Authorization Placement

## 2022-04-22 NOTE — ASSESSMENT & PLAN NOTE
52 y/o with psychiatric illness - currently under [facility], DM, DVT/PE history with IVC filter in place, s/p splenectomy; PCN allergy listed as anaphylaxis on chart;  Admitted with bilateral wounds to feet - left worse than right (left foot with charcot deformity) and recurrent DVTs both legs. Patient was given vancomycin X 1 in ED then clindamycin until 3/17 then now on bactrim and flagyl starting today. The left foot culture collected 4/13 positive for MRSA and finegoldia magna. Patient's WBC on admit was 31 then decreased to 12 but now up again to 18 on 4/19.     Rec  Creatinine increased to 2.4 on 4/20  - stop bactrim and change to vancomycin; continue flagyl  Creatinine better on 4/21 = 1.0  Bilateral DVT may be clouding the picture with the leg erythema (hard to tell difference in cellulitis vs DVT)   Need Podiatry to reassess foot wounds if not already done  Recommend updating Tdap - last one was January 2014 per chart - patient described trauma to the feet as cause of the ulcerations - so recommend updating Tdap

## 2022-04-22 NOTE — PHYSICIAN QUERY
PT Name: Audrey Natarajan  MR #: [id number]     DOCUMENTATION CLARIFICATION     CDS/: Anup Matta RN, CCDS       Contact information:   [email or web address]      This form is a permanent document in the medical record.     Query Date: April 22, 2022    By submitting this query, we are merely seeking further clarification of documentation.  Please utilize your independent clinical judgment when addressing the question(s) below.      The Medical Record contains the following:    Clinical Information Location in Medical Records   ED vitals: 107/49,   86,  18,  99.1,   93%  bilateral lower extremities are edematous and erythematous  Clinical Impression: Cellulitis of left lower extremity ; Bilateral lower extremity edema; Bilateral leg pain; Bilateral leg and foot pain   4/12 ED provider note    Ulcer to plantar right distal forefoot and plantar right hallux, superficial to fat layer, with fibronecrotic base. No purulence or acute signs of infection.  ---LLE with erythema and edema noted. Ulcer to plantar left midfoot with periwound hyperkeratosis and dry skin;  ----Cellulitis of lower extremity;   4/13  Podiatry    4/13 WBC: 31.22  4/14 WBC: 14.07  4/15 WBC: 12.32  4/16 WBC: 13.25  4/19 WBC: 18.05    4/13 lactate: 1.6;     Labs     Principal Problem:Cellulitis of lower extremity;  She had fever up to 102F yesterday; BLE DVT on venous US, WBC 31, and Hgb 9.9. She received a dose of vancomycin while in the ED.   -switch to clindamycin    ...patient is septic today and rising WBC; switching clinda to bactrim and adding flagyl; will have podiatry re-evaluate in AM and have ID consulted for abx regimen    4/13 H&P, Hospital Medicine ( HM)          4/18 Virtual            According to coding guidelines, Present on Admission is defined as present at the time the order for inpatient admission occurs. Conditions that develop during an outpatient encounter, including emergency department, observation, or  outpatient surgery, are considered as present on admission.       Please clarify the Present on Admission (POA) status of the diagnosis: ___sepsis______    [  x] Yes (Y)   [  ] No (N)   [  ] Documentation insufficient to determine if condition is POA (U)   [  ] Clinically Undetermined (W)     Reference:  ICD-10-CM Official Guidelines for Coding and Reporting FY 2021. (2020). Retrieved October 21, 2020, from https://www.cdc.gov/nchs/data/icd/10cmguidelines-FY2021.pdf?[email or web address]    Form No. [id number]

## 2022-04-22 NOTE — PROGRESS NOTES
PSYCHIATRY INPATIENT PROGRESS NOTE  SUBSEQUENT HOSPITAL VISIT      4/22/2022 8:29 AM   Audrey Natarajan   1970   [id number]           DATE OF ADMISSION: 4/12/2022 11:28 PM    SITE: [facility]    CURRENT LEGAL STATUS: Judicial Commitment Petition Filed on 04/21/2022         HISTORY    Per Initial History from Primary Team:   Audrey Natarajan is a 52 yo female with a pmh of DM2, bipolar 1 disorder, cellulitis, COPD, HTN, CAD, DVT/PE. She presented to the ED with c/o fevers x 1 day. She also has BLE swelling and pain and wounds to both feet, worsening x 2 weeks. She had fever up to 102F yesterday. She reports the wounds to her right foot are from dragging her feet while she was angry. Denies drainage to foot wounds. She has had blood clots in the past and has an IVC filter placed in 2012. She was sent here from [facility] where she was under CEC for psychosis. ED workup revealed BLE DVT on venous US, WBC 31, and Hgb 9.9. She received a dose of vancomycin while in the ED.   Interval History from Primary Team on 04/21/2022:  Tachycardic to 120s with other VSS. Now a Judicial Commitment Petition Filed Status (filed today) due to continued grave disability 2/2 mental illness at this time (will have court paperwork scanned into chart once received from the hospital ). On IV vancomycin. ID and Podiatry following left foot wound. Nephrology following ODESSA and hyperkalemia. Heme/Onc consulted. Psych following.        Chief Complaint / Reason for Original Psychiatry Consult: Psychosis / Bipolar I Disorder; Patient from [facility] on PEC/CEC       Subjective / Interval Psychiatric History Today (04/22/2022) (with psychiatric ROS below):   Audrey Natarajan is a 51 y.o. female with a past medical history as noted above/below, and a past psychiatric history of Bipolar I Disorder, psychosis, depression, and ADHD, currently being treated by her inpatient primary team for a principle problem  of acute DVTs of BLEs and wound infection.  Psychiatry was originally consulted as noted above.  The patient was seen and examined again this AM.  The chart was reviewed again this AM.  PRN Zyprexa was needed last night for non-redirectable psychotic / manic agitation.  Patient's affect remains notably labile today, but she is more redirectable for me this AM.  On examination today, the patient remains alert and oriented to person, place, city, state, month, year, and situation.  She remains CAM-ICU negative for delirium.  She continues to perseverate on leaving the hospital and remains with limited insight into the need for IV antibiotics for her foot to heal at this time.  Primary Team and ID are awaiting f/u from podiatry regarding further medical/surgical management of foot wound.  She continues to appear with paranoia, racing thoughts, loosening of associations, pressured/rapid speech, and tangential / disorganized TP.  She denies issues with sleep overnight (possible improvement with PRN Zyprexa).  She endorses a good appetite.  She denies AH, VH, or TH (no RIS observed).  She denies any current/recent passive/active SI/HI.  She denies any adverse effects to her current medication regimen.  She denies any current medical/physical complaints at this time.  NAD was observed during the examination.  Patient no longer has bizarre drink concoction next to bed.  See detailed psych ROS below.  Psychotherapy was again implemented with a focus on improving mood / bibi / thought process and sleep.  See A/P below.          Psychiatric Review Of Systems - Currently, the patient is endorsing and/or denying the following:  (patient's endorsements are BOLDED below; if not BOLDED, then patient denied):     Endorses Symptoms of Depression: diminished mood, low motivation, loss of interest/anhedonia, irritability, diminished energy, change in sleep, change in appetite, diminished concentration or cognition or indecisiveness,  PMA/R, excessive guilt or hopelessness or worthlessness, suicidal ideations     Denies issues with Sleep: initiation, maintenance, early morning awakening with inability to return to sleep     Denies Suicidal/Homicidal ideations: active/passive ideations, organized plans, future intentions     Endorses Symptoms of psychosis: paranoia, hallucinations, delusions, disorganized thinking (intermittently more organized today), disorganized behavior or abnormal motor behavior, or negative symptoms (diminshed emotional expression, avolition, anhedonia, alogia, asociality)      Endorses Symptoms of bibi or hypomania: elevated, expansive, or irritable mood with increased energy or activity; with inflated self-esteem or grandiosity, decreased need for sleep, increased rate of speech, FOI or racing thoughts, distractibility, increased goal directed activity or PMA, risky/disinhibited behavior     Denies Symptoms of MALISSA: excessive anxiety/worry/fear, more days than not, about numerous issues, difficult to control, with restlessness, fatigue, poor concentration, irritability, muscle tension, sleep disturbance; causes functionally impairing distress      Denies Symptoms of Panic Disorder: recurrent panic attacks, precipitated or un-precipitated, source of worry and/or behavioral changes secondary; with or without agoraphobia     Denies Symptoms of PTSD: h/o trauma; re-experiencing/intrusive symptoms, avoidant behavior, negative alterations in cognition or mood, or hyperarousal symptoms; with or without dissociative symptoms      Denies Symptoms of OCD: obsessions or compulsions      Denies Symptoms of Eating Disorders: anorexia, bulimia or binging     Denies Substance Use: intoxication, withdrawal, tolerance, used in larger amounts or duration than intended, unsuccessful attempts to limit or quit, increased time engaging in or seeking out, cravings or strong desire to use, failure to fulfill obligations, negative consequences in  social/interpersonal/occupational,/recreational areas, use in dangerous situations, medical or psychological consequences         PSYCHOTHERAPY ADD-ON +[id number]   30 (16-37*) minutes     Time: 17 minutes  Participants: Met with patient     Therapeutic Intervention Type: behavior modifying psychotherapy, supportive psychotherapy  Why chosen therapy is appropriate versus another modality: relevant to diagnosis, patient responds to this modality, evidence based practice     Target symptoms: mood / bibi / TERRI and insomnia   Primary focus: improving mood / bibi / thought process and sleep  Psychotherapeutic techniques: supportive and psychodynamic techniques; psycho-education; deep breathing exercises; CBT; problem solving techniques and managing life stressors     Outcome monitoring methods: self-report, observation     Patient's response to intervention:  The patient's response to intervention is more accepting today / limited due to psychosis / bibi      Progress toward goals:  The patient's progress toward goals is fair / limited / slightly improved again today         ROS  General ROS: negative for - chills, fatigue, fever or night sweats  Ophthalmic ROS: negative for - blurry vision, double vision or eye pain  ENT ROS: negative for - sinus pain, headaches, sore throat or visual changes  Allergy and Immunology ROS: negative for - hives, itchy/watery eyes or nasal congestion  Hematological and Lymphatic ROS: negative for - bleeding problems, bruising, jaundice or pallor  Endocrine ROS: negative for - galactorrhea, hot flashes, mood swings, palpitations or temperature intolerance  Respiratory ROS: negative for - cough, hemoptysis, shortness of breath, tachypnea or wheezing  Cardiovascular ROS: negative for - chest pain, dyspnea on exertion, loss of consciousness, palpitations, rapid heart rate or shortness of breath  Gastrointestinal ROS: negative for - appetite loss, nausea, abdominal pain, blood in stools, change in  "bowel habits, constipation or diarrhea  Genito-Urinary ROS: negative for - incontinence, nocturia or pelvic pain  Musculoskeletal ROS: negative for - joint stiffness, joint pain, or myalgias   Neurological ROS: negative for - behavioral changes, confusion, dizziness, memory loss, numbness/tingling or seizures  Dermatological ROS: negative for dry skin, hair changes, pruritus or rash; positive for color change / wound (improving)  Psychiatric ROS: see detailed psychiatric ROS above in subjective section       PAST MEDICAL & SURGICAL HISTORY   Past Medical History:   Diagnosis Date    ADHD (attention deficit hyperactivity disorder)     Arthritis     Asthma     Bipolar 1 disorder     Cataract     COPD (chronic obstructive pulmonary disease)     Coronary artery disease     A fib    Depression     bipolar manic depresson    Diabetes mellitus     DVT of lower extremity, bilateral July 2013    bilateral LE DVT. Cameron filter placed.     Encounter for blood transfusion     History of blood clots 1. Left Leg=2003; 2.Bilateral Groin=Blood Clots= 5 or 6/ 2013 & 7/2013; 3. LLL of Lung=7/2013;  4. Lt. Lower Leg=7/2013.     Pt. had 1st Blood Clot after [id number]=[id number], & Last=2013. Cameron Filter= Rt.Lateral Neck.    HTN (hypertension) 6/6/2013    Pt states that she does not have hypertension    Hypercholesteremia     Irregular heartbeat     Neuromuscular disorder     neuropathy feet    PE (pulmonary embolism) July 2013     bilat LE DVT.     Restless leg syndrome      Past Surgical History:   Procedure Laterality Date    ABDOMINAL SURGERY  2010    gastric sleeve    BILATERAL OOPHORECTOMY Bilateral 1/12/2015    CHOLECYSTECTOMY      Green' s filter Right 7/4/2012    Right Neck & Tunneled Down.    HERNIA REPAIR      "[place] of Hernias Repaires around th [place].", pt. states    LAPAROSCOPIC CHOLECYSTECTOMY N/A 9/10/2020    Procedure: CHOLECYSTECTOMY, LAPAROSCOPIC;  Surgeon: Montrell Gutierrez MD;  " Location: [facility] OR;  Service: General;  Laterality: N/A;  RN PREOP ----COVID Negative      OVARIAN CYST REMOVAL  3/13/2014    UT REMOVAL OF OVARY/TUBE(S)      SPLENECTOMY, TOTAL  2003    TONSILLECTOMY      as a child    TYMPANOSTOMY TUBE PLACEMENT  1976    VEIN SURGERY      Lt leg       FAMILY HISTORY   Family History   Problem Relation Age of Onset    Hypertension Father     Diabetes Father     Heart disease Father     Cataracts Father     Diabetes Paternal Grandfather     Heart disease Paternal Grandfather     No Known Problems Mother     Ovarian cancer Maternal Grandmother          from this. ? age     No Known Problems Sister     No Known Problems Brother     No Known Problems Maternal Aunt     No Known Problems Maternal Uncle     No Known Problems Paternal Aunt     No Known Problems Paternal Uncle     No Known Problems Maternal Grandfather     Ovarian cancer Paternal Grandmother     Uterine cancer Neg Hx     Breast cancer Neg Hx     Colon cancer Neg Hx     Amblyopia Neg Hx     Blindness Neg Hx     Cancer Neg Hx     Glaucoma Neg Hx     Macular degeneration Neg Hx     Retinal detachment Neg Hx     Strabismus Neg Hx     Stroke Neg Hx     Thyroid disease Neg Hx        ALLERGIES   Review of patient's allergies indicates:   Allergen Reactions    Morphine Other (See Comments)     Patient had a psychotic episode after taking Morphine  Agitation, hallucinations    Penicillins Anaphylaxis     itching    Januvia [sitagliptin] Hives       CURRENT MEDICATION REGIMEN   Home Meds:   Prior to Admission medications    Medication Sig Start Date End Date Taking? Authorizing Provider   aspirin 81 MG Chew Take 1 tablet (81 mg total) by mouth once daily. 21 Yes Ifeoma Jacobs MD   DUPIXENT  mg/2 mL PnIj SMARTSI Milligram(s) SUB-Q Every 2 Weeks 22  Yes Historical Provider   famotidine (PEPCID) 20 MG tablet Take 20 mg by mouth 2 (two) times daily.    Yes Historical Provider   fluticasone-salmeterol diskus inhaler 250-50 mcg Inhale 1 puff into the lungs 2 (two) times daily. Controller 11/16/21 11/16/22 Yes Donaldo Pena MD   furosemide (LASIX) 20 MG tablet TAKE 1 TABLET(20 MG) BY MOUTH EVERY DAY 2/8/22  Yes Donaldo Pena MD   gabapentin (NEURONTIN) 300 MG capsule TAKE 2 CAPSULES(600 MG) BY MOUTH TWICE DAILY 4/1/22  Yes Donaldo Pena MD   hydrOXYzine (ATARAX) 50 MG tablet Take 50 mg by mouth 4 (four) times daily as needed. 3/17/22  Yes Historical Provider   ibuprofen (ADVIL,MOTRIN) 600 MG tablet TAKE 1 TABLET(600 MG) BY MOUTH EVERY 8 HOURS AS NEEDED FOR PAIN OR BACK PAIN 4/11/22  Yes Donaldo Pena MD   lisinopriL 10 MG tablet Take 1 tablet (10 mg total) by mouth once daily. 4/4/22  Yes Donaldo Pena MD   loratadine (CLARITIN) 10 mg tablet Take 1 tablet (10 mg total) by mouth once daily. 12/30/21 12/30/22 Yes Lary Sweet DO   metFORMIN (GLUCOPHAGE) 1000 MG tablet TAKE 1 TABLET(1000 MG) BY MOUTH TWICE DAILY WITH MEALS 12/26/21  Yes Donaldo Pena MD   metoprolol tartrate (LOPRESSOR) 50 MG tablet TAKE 1 TABLET(50 MG) BY MOUTH TWICE DAILY 12/26/21  Yes Donaldo Pena MD   pravastatin (PRAVACHOL) 40 MG tablet TAKE 1 TABLET(40 MG) BY MOUTH EVERY EVENING 12/26/21  Yes Donaldo Pena MD   acetaminophen (TYLENOL) 500 MG tablet Take 2 tablets (1,000 mg total) by mouth every 6 (six) hours as needed for Pain. 7/13/21   Lary Sweet DO   albuterol (PROVENTIL/VENTOLIN HFA) 90 mcg/actuation inhaler Inhale 2 puffs into the lungs every 6 (six) hours as needed for Wheezing. Use with spacer  Dispense with 1 spacer 12/30/21 12/30/22  Lary Sweet DO   albuterol-ipratropium (DUO-NEB) 2.5 mg-0.5 mg/3 mL nebulizer solution Take 3 mLs by nebulization every 6 (six) hours as needed for Wheezing or Shortness of Breath. Rescue 7/19/21 7/19/22  Donaldo Pena MD   aluminum-magnesium hydroxide-simethicone (MAALOX) 200-200-20 mg/5 mL Susp Take 30 mLs by mouth  every 6 (six) hours as needed (indigestion). 2/20/21 2/20/22  Ifeoma Jacobs MD   blood sugar diagnostic Strp To check BG two  times daily, to use with insurance preferred meter 9/30/21   Donaldo Pena MD   blood-glucose meter kit To check BG once daily, to use with insurance preferred meter 2/20/21 12/8/23  Ifeoma Jacobs MD   blood-glucose meter kit To check BG two times daily, to use with insurance preferred meter 9/30/21 9/30/22  Donaldo Pena MD   dicyclomine (BENTYL) 20 mg tablet Take 1 tablet (20 mg total) by mouth every 6 (six) hours. 3/12/22   Tu Arredondo PA-C   divalproex (DEPAKOTE) 500 MG TbEC Take 1 tablet (500 mg total) by mouth once daily. 4/19/22 4/19/23  Diego Ridley MD   divalproex (DEPAKOTE) 500 MG TbEC Take 2 tablets (1,000 mg total) by mouth every evening. 4/18/22 4/18/23  Diego Ridley MD   enoxaparin (LOVENOX) 100 mg/mL Syrg Inject 1 mL (100 mg total) into the skin every 12 (twelve) hours. 4/18/22   Diego Ridley MD   EPITOL 200 mg tablet  2/26/21   Historical Provider   fluticasone propionate (FLONASE) 50 mcg/actuation nasal spray 1 spray (50 mcg total) by Each Nostril route 2 (two) times daily. 12/30/21   Lary Sweet DO   folic acid (FOLVITE) 1 MG tablet Take 1 tablet (1 mg total) by mouth once daily. 7/19/21 10/17/21  Donaldo Pena MD   hydrOXYzine pamoate (VISTARIL) 50 MG Cap Take 1 capsule (50 mg total) by mouth every 8 (eight) hours as needed (insomnia). 4/18/22   Diego Ridley MD   lancets Misc To check BG two times daily, to use with insurance preferred meter 9/30/21   Donaldo Pena MD   multivitamin Tab Take 1 tablet by mouth once daily. 2/20/21   MD JORGE Rick LG MASK Spcr Inhale into the lungs. 12/30/21   Historical Provider   pantoprazole (PROTONIX) 40 MG tablet Take 1 tablet (40 mg total) by mouth once daily. 7/13/21   Aiden Oleary MD   polyvinyl alcohol, artificial tears, (LIQUIFILM TEARS) 1.4 % ophthalmic solution Place 1  drop into both eyes 4 (four) times daily. 2/20/21   Ifeoma Jacobs MD   risperiDONE (RISPERDAL M-TABS) 2 MG disintegrating tablet Take 1 tablet (2 mg total) by mouth 2 (two) times daily. 4/18/22 4/18/23  Diego Ridley MD   senna (SENOKOT) 8.6 mg tablet Take 1 tablet by mouth 2 (two) times a day. 2/20/21   Ifeoma Jacobs MD   sulfamethoxazole-trimethoprim 800-160mg (BACTRIM DS) 800-160 mg Tab Take 2 tablets by mouth 2 (two) times daily. for 10 days 4/18/22 4/28/22  Diego Ridley MD   TRUE METRIX GLUCOSE METER [facility] CHECK BLOOD SUGAR TWICE DAILY 11/4/21   Donaldo Pena MD   diclofenac sodium (VOLTAREN) 1 % Gel Apply 2 g topically 4 (four) times daily as needed (Apply to painful area up to 4 times a day as needed for pain). Apply to painful area 4 times a day as needed for pain 10/1/21 4/18/22  Corie Iqbal NP   nystatin (NYSTOP) powder APPLY TOPICALLY TO AXILLA AND BREAST FOLDS TWICE DAILY 9/30/21 4/18/22  Donaldo Pena MD   QUEtiapine (SEROQUEL) 200 MG Tab Take 1 tablet (200 mg total) by mouth before breakfast. 2/21/21 4/18/22  Ifeoma Jacobs MD       Scheduled Meds:    apixaban  10 mg Oral BID    aspirin  81 mg Oral Daily    divalproex  1,250 mg Oral QHS    divalproex  500 mg Oral Daily    famotidine  20 mg Oral Daily    ferrous sulfate  1 tablet Oral Daily    fluticasone furoate-vilanteroL  1 puff Inhalation Daily    gabapentin  300 mg Oral BID    hydrOXYzine pamoate  50 mg Oral TID    insulin detemir U-100  10 Units Subcutaneous QHS    magnesium oxide  400 mg Oral BID    metoprolol tartrate  50 mg Oral BID    metroNIDAZOLE  500 mg Oral Q8H    miconazole NITRATE 2 %   Topical (Top) BID    mupirocin   Nasal BID    nicotine  1 patch Transdermal Daily    pravastatin  40 mg Oral QHS    risperiDONE  2 mg Oral Daily    risperiDONE  3 mg Oral Nightly    vancomycin (VANCOCIN) IVPB  1,250 mg Intravenous Q12H      PRN Meds: acetaminophen, albuterol-ipratropium, dextrose 10%, dextrose 10%,  glucagon (human recombinant), glucose, glucose, HYDROcodone-acetaminophen, hydrocortisone, insulin aspart U-100, melatonin, naloxone, ondansetron, senna-docusate 8.6-50 mg, sodium chloride 0.9%, DIPH,PERTUSS(ACELL),TET VACCINE (ADULT)(BOOSTRIX,ADACEL), Pharmacy to dose Vancomycin consult **AND** vancomycin - pharmacy to dose   Psychotherapeutics (From admission, onward)            Start     Stop Route Frequency Ordered    04/20/22 0900  risperiDONE disintegrating tablet 2 mg         -- Oral Daily 04/19/22 1439    04/19/22 2100  risperiDONE disintegrating tablet 3 mg         -- Oral Nightly 04/19/22 1439          LABORATORY DATA   Recent Results (from the past 72 hour(s))   POCT glucose    Collection Time: 04/19/22 11:26 AM   Result Value Ref Range    POCT Glucose 249 (H) 70 - 110 mg/dL   POCT glucose    Collection Time: 04/19/22  4:25 PM   Result Value Ref Range    POCT Glucose 170 (H) 70 - 110 mg/dL   Potassium    Collection Time: 04/19/22  5:45 PM   Result Value Ref Range    Potassium 4.6 3.5 - 5.1 mmol/L   Comprehensive metabolic panel    Collection Time: 04/20/22  5:01 AM   Result Value Ref Range    Sodium 130 (L) 136 - 145 mmol/L    Potassium 5.4 (H) 3.5 - 5.1 mmol/L    Chloride 99 95 - 110 mmol/L    CO2 15 (L) 23 - 29 mmol/L    Glucose 160 (H) 70 - 110 mg/dL    BUN 49 (H) 6 - 20 mg/dL    Creatinine 2.4 (H) 0.5 - 1.4 mg/dL    Calcium 10.2 8.7 - 10.5 mg/dL    Total Protein 6.3 6.0 - 8.4 g/dL    Albumin 2.9 (L) 3.5 - 5.2 g/dL    Total Bilirubin 0.2 0.1 - 1.0 mg/dL    Alkaline Phosphatase 61 55 - 135 U/L    AST 22 10 - 40 U/L    ALT 18 10 - 44 U/L    Anion Gap 16 8 - 16 mmol/L    eGFR if African American 27 (A) >60 mL/min/1.73 m^2    eGFR if non African American 23 (A) >60 mL/min/1.73 m^2   CK    Collection Time: 04/20/22  5:01 AM   Result Value Ref Range     (H) 20 - 180 U/L   POCT glucose    Collection Time: 04/20/22  5:44 AM   Result Value Ref Range    POCT Glucose 182 (H) 70 - 110 mg/dL   CBC Auto  Differential    Collection Time: 04/20/22  1:12 PM   Result Value Ref Range    WBC 17.68 (H) 3.90 - 12.70 K/uL    RBC 3.83 (L) 4.00 - 5.40 M/uL    Hemoglobin 10.5 (L) 12.0 - 16.0 g/dL    Hematocrit 32.4 (L) 37.0 - 48.5 %    MCV 85 82 - 98 fL    MCH 27.4 27.0 - 31.0 pg    MCHC 32.4 32.0 - 36.0 g/dL    RDW 15.7 (H) 11.5 - 14.5 %    Platelets 746 (H) 150 - 450 K/uL    MPV 10.1 9.2 - 12.9 fL    Immature Granulocytes 4.1 (H) 0.0 - 0.5 %    Gran # (ANC) 10.3 (H) 1.8 - 7.7 K/uL    Immature Grans (Abs) 0.72 (H) 0.00 - 0.04 K/uL    Lymph # 3.9 1.0 - 4.8 K/uL    Mono # 2.2 (H) 0.3 - 1.0 K/uL    Eos # 0.4 0.0 - 0.5 K/uL    Baso # 0.09 0.00 - 0.20 K/uL    nRBC 0 0 /100 WBC    Gran % 58.4 38.0 - 73.0 %    Lymph % 22.1 18.0 - 48.0 %    Mono % 12.6 4.0 - 15.0 %    Eosinophil % 2.3 0.0 - 8.0 %    Basophil % 0.5 0.0 - 1.9 %    Platelet Estimate Increased (A)     Aniso Slight     Poik Slight     Poly Occasional     Hypo Occasional     Target Cells Occasional     [place] Cells Occasional     Differential Method Automated    Sodium, Random Urine    Collection Time: 04/20/22  1:45 PM   Result Value Ref Range    Sodium, Urine 41 20 - 250 mmol/L   Creatinine, Random Urine    Collection Time: 04/20/22  1:45 PM   Result Value Ref Range    Creatinine, Urine 48.5 15.0 - 325.0 mg/dL   Protein/Creatinine Ratio, Urine    Collection Time: 04/20/22  1:45 PM   Result Value Ref Range    Protein, Urine Random <7 0 - 15 mg/dL    Creatinine, Urine 48.5 15.0 - 325.0 mg/dL    Prot/Creat Ratio, Urine Unable to calculate 0.00 - 0.20   Urea nitrogen, urine    Collection Time: 04/20/22  1:45 PM   Result Value Ref Range    Urine Urea Nitrogen 419 140 - 1050 mg/dL   Protein/Creatinine Ratio, Urine    Collection Time: 04/20/22  1:45 PM   Result Value Ref Range    Protein, Urine Random <7 0 - 15 mg/dL    Creatinine, Urine 48.5 15.0 - 325.0 mg/dL    Prot/Creat Ratio, Urine Unable to calculate 0.00 - 0.20   Sodium, urine, random    Collection Time: 04/20/22  1:45 PM    Result Value Ref Range    Sodium, Urine 41 20 - 250 mmol/L   Mccollum's Stain, Urine Random    Collection Time: 04/20/22  1:45 PM   Result Value Ref Range    Mccollum's Stain, Ur No eosinophils seen No eosinophils seen   Vancomycin, random    Collection Time: 04/20/22  3:40 PM   Result Value Ref Range    Vancomycin, Random 30.3 Not established ug/mL   POCT glucose    Collection Time: 04/20/22  5:30 PM   Result Value Ref Range    POCT Glucose 213 (H) 70 - 110 mg/dL   POCT glucose    Collection Time: 04/20/22  9:18 PM   Result Value Ref Range    POCT Glucose 265 (H) 70 - 110 mg/dL   POCT glucose    Collection Time: 04/21/22  5:01 AM   Result Value Ref Range    POCT Glucose 235 (H) 70 - 110 mg/dL   CBC Auto Differential    Collection Time: 04/21/22  7:14 AM   Result Value Ref Range    WBC 15.65 (H) 3.90 - 12.70 K/uL    RBC 3.98 (L) 4.00 - 5.40 M/uL    Hemoglobin 10.9 (L) 12.0 - 16.0 g/dL    Hematocrit 34.3 (L) 37.0 - 48.5 %    MCV 86 82 - 98 fL    MCH 27.4 27.0 - 31.0 pg    MCHC 31.8 (L) 32.0 - 36.0 g/dL    RDW 15.9 (H) 11.5 - 14.5 %    Platelets 807 (H) 150 - 450 K/uL    MPV 11.1 9.2 - 12.9 fL    Immature Granulocytes 3.1 (H) 0.0 - 0.5 %    Gran # (ANC) 10.0 (H) 1.8 - 7.7 K/uL    Immature Grans (Abs) 0.49 (H) 0.00 - 0.04 K/uL    Lymph # 3.0 1.0 - 4.8 K/uL    Mono # 1.7 (H) 0.3 - 1.0 K/uL    Eos # 0.3 0.0 - 0.5 K/uL    Baso # 0.10 0.00 - 0.20 K/uL    nRBC 0 0 /100 WBC    Gran % 64.0 38.0 - 73.0 %    Lymph % 19.3 18.0 - 48.0 %    Mono % 11.1 4.0 - 15.0 %    Eosinophil % 1.9 0.0 - 8.0 %    Basophil % 0.6 0.0 - 1.9 %    Differential Method Automated    Renal Function Panel    Collection Time: 04/21/22  7:14 AM   Result Value Ref Range    Glucose 208 (H) 70 - 110 mg/dL    Sodium 130 (L) 136 - 145 mmol/L    Potassium 6.7 (HH) 3.5 - 5.1 mmol/L    Chloride 95 95 - 110 mmol/L    CO2 19 (L) 23 - 29 mmol/L    BUN 48 (H) 6 - 20 mg/dL    Calcium 11.4 (H) 8.7 - 10.5 mg/dL    Creatinine 1.3 0.5 - 1.4 mg/dL    Albumin 3.6 3.5 - 5.2  g/dL    Phosphorus 3.9 2.7 - 4.5 mg/dL    eGFR if African American 56 (A) >60 mL/min/1.73 m^2    eGFR if non African American 48 (A) >60 mL/min/1.73 m^2    Anion Gap 16 8 - 16 mmol/L   POCT glucose    Collection Time: 04/21/22 11:44 AM   Result Value Ref Range    POCT Glucose 259 (H) 70 - 110 mg/dL   ISTAT PROCEDURE    Collection Time: 04/21/22 12:40 PM   Result Value Ref Range    POC PH 7.497 (H) 7.35 - 7.45    POC PCO2 35.3 35 - 45 mmHg    POC PO2 159 (H) 80 - 100 mmHg    POC HCO3 27.3 24 - 28 mmol/L    POC BE 4 -2 to 2 mmol/L    POC SATURATED O2 100 95 - 100 %    POC TCO2 28 (H) 23 - 27 mmol/L    Sample ARTERIAL     Site RR     Allens Test Pass     DelSys Room Air    Iron and TIBC    Collection Time: 04/21/22  1:33 PM   Result Value Ref Range    Iron 19 (L) 30 - 160 ug/dL    Transferrin 271 200 - 375 mg/dL    TIBC 401 250 - 450 ug/dL    Saturated Iron 5 (L) 20 - 50 %   Renal Function Panel    Collection Time: 04/21/22  1:33 PM   Result Value Ref Range    Glucose 258 (H) 70 - 110 mg/dL    Sodium 132 (L) 136 - 145 mmol/L    Potassium 5.7 (H) 3.5 - 5.1 mmol/L    Chloride 96 95 - 110 mmol/L    CO2 24 23 - 29 mmol/L    BUN 41 (H) 6 - 20 mg/dL    Calcium 10.4 8.7 - 10.5 mg/dL    Creatinine 1.0 0.5 - 1.4 mg/dL    Albumin 3.0 (L) 3.5 - 5.2 g/dL    Phosphorus 3.0 2.7 - 4.5 mg/dL    eGFR if African American >60 >60 mL/min/1.73 m^2    eGFR if non African American >60 >60 mL/min/1.73 m^2    Anion Gap 12 8 - 16 mmol/L   VANCOMYCIN, TROUGH    Collection Time: 04/21/22  1:33 PM   Result Value Ref Range    Vancomycin-Trough 5.3 (L) 10.0 - 22.0 ug/mL   ISTAT PROCEDURE    Collection Time: 04/21/22  2:31 PM   Result Value Ref Range    POC PH 7.478 (H) 7.35 - 7.45    POC PCO2 36.9 35 - 45 mmHg    POC PO2 167 (H) 80 - 100 mmHg    POC HCO3 27.4 24 - 28 mmol/L    POC BE 4 -2 to 2 mmol/L    POC SATURATED O2 100 95 - 100 %    POC Sodium 130 (L) 136 - 145 mmol/L    POC Potassium 5.4 (H) 3.5 - 5.1 mmol/L    POC TCO2 28 (H) 23 - 27 mmol/L     POC Hematocrit 32 (L) 36 - 54 %PCV    POC HEMOGLOBIN 11 g/dL    Sample ARTERIAL     Site RR     Allens Test Pass    POCT glucose    Collection Time: 04/21/22  4:35 PM   Result Value Ref Range    POCT Glucose 258 (H) 70 - 110 mg/dL   POCT glucose    Collection Time: 04/21/22  8:53 PM   Result Value Ref Range    POCT Glucose 302 (H) 70 - 110 mg/dL   CBC auto differential    Collection Time: 04/22/22  4:50 AM   Result Value Ref Range    WBC 13.21 (H) 3.90 - 12.70 K/uL    RBC 3.62 (L) 4.00 - 5.40 M/uL    Hemoglobin 10.0 (L) 12.0 - 16.0 g/dL    Hematocrit 30.4 (L) 37.0 - 48.5 %    MCV 84 82 - 98 fL    MCH 27.6 27.0 - 31.0 pg    MCHC 32.9 32.0 - 36.0 g/dL    RDW 15.7 (H) 11.5 - 14.5 %    Platelets 806 (H) 150 - 450 K/uL    MPV 10.1 9.2 - 12.9 fL    Immature Granulocytes 2.2 (H) 0.0 - 0.5 %    Gran # (ANC) 8.0 (H) 1.8 - 7.7 K/uL    Immature Grans (Abs) 0.29 (H) 0.00 - 0.04 K/uL    Lymph # 2.9 1.0 - 4.8 K/uL    Mono # 1.7 (H) 0.3 - 1.0 K/uL    Eos # 0.3 0.0 - 0.5 K/uL    Baso # 0.06 0.00 - 0.20 K/uL    nRBC 0 0 /100 WBC    Gran % 60.1 38.0 - 73.0 %    Lymph % 22.2 18.0 - 48.0 %    Mono % 12.7 4.0 - 15.0 %    Eosinophil % 2.3 0.0 - 8.0 %    Basophil % 0.5 0.0 - 1.9 %    Differential Method Automated    Comprehensive metabolic panel    Collection Time: 04/22/22  4:50 AM   Result Value Ref Range    Sodium 134 (L) 136 - 145 mmol/L    Potassium 4.9 3.5 - 5.1 mmol/L    Chloride 96 95 - 110 mmol/L    CO2 27 23 - 29 mmol/L    Glucose 231 (H) 70 - 110 mg/dL    BUN 26 (H) 6 - 20 mg/dL    Creatinine 0.8 0.5 - 1.4 mg/dL    Calcium 10.8 (H) 8.7 - 10.5 mg/dL    Total Protein 7.2 6.0 - 8.4 g/dL    Albumin 3.1 (L) 3.5 - 5.2 g/dL    Total Bilirubin 0.3 0.1 - 1.0 mg/dL    Alkaline Phosphatase 69 55 - 135 U/L    AST 11 10 - 40 U/L    ALT 14 10 - 44 U/L    Anion Gap 11 8 - 16 mmol/L    eGFR if African American >60 >60 mL/min/1.73 m^2    eGFR if non African American >60 >60 mL/min/1.73 m^2   POCT glucose    Collection Time: 04/22/22  6:15 AM  "  Result Value Ref Range    POCT Glucose 234 (H) 70 - 110 mg/dL      Lab Results   Component Value Date    VALPROATE 64.3 04/16/2022    CBMZ 3.6 (L) 01/26/2021         EXAMINATION    VITALS   Vitals:    04/22/22 0405 04/22/22 0513 04/22/22 0742 04/22/22 0755   BP:  (!) 149/68 128/67    BP Location:       Patient Position:   Sitting    Pulse: (!) 124 100 100 100   Resp:  20 19 18   Temp:  96.2 °F (35.7 °C) 96.3 °F (35.7 °C)    TempSrc:   Axillary    SpO2: 97% 95%  95%   Weight:       Height:            CONSTITUTIONAL  General Appearance: NAD, unremarkable, age appropriate, disheveled, in bed, calmer, overweight     MUSCULOSKELETAL  Muscle Strength and Tone: WNL  Abnormal Involuntary Movements: none observed   Gait and Station: WNL; non-ataxic      PSYCHIATRIC   Behavior/Cooperation:  more cooperative, restless and fidgety, eye contact intermittent   Speech:  normal tone, normal pitch, loud volume, rapid/pressured   Language: grossly intact, able to name, able to repeat with spontaneous speech  Mood: "OK; tired of being here"  Affect:  Labile ; Bizarre   Associations: intermittent TERRI  Thought Process: Linear with intermittent tangential / TERRI ; disorganization   Thought Content: + paranoia ; denies SI, HI, AH, VH, TH, delusions (no RIS observed)  Sensorium:  Awake  Alert and Oriented: to person, place, situation, month of year, year  Memory: 3/3 immediate, 2/3 at 5 minutes               Recent: Intact; able to report recent events              Remote: Intact; Named 4/4 past presidents   Attention/concentration: Fair.  Requiring frequent redirection. Appropriate for age/education. Able to spell w-[id number] & d-l-r-[id number]-w.   Similarities: Intact (difference between apple and orange?)  Abstract reasoning: Limited   Insight: Limited  Judgment: Limited     CAM ICU Delirium Assessment - NEGATIVE        Is the patient aware of the biomedical complications associated with substance abuse and mental illness? yes           MEDICAL " DECISION MAKING     ASSESSMENT      Bipolar I Disorder (currently severe with mixed s/s of depression and bibi)   Unspecified Insomnia   Hx of ADHD      RECOMMENDATIONS       - Patient is now in a Judicial Commitment Petition Filed Status (filed on 04/21/2022) due to continued grave disability 2/2 mental illness at this time (will have court paperwork scanned into chart today).       - Once medically cleared / stable, seek transfer back to [facility] (or another inpatient psychiatric facility; patient may need Med-Psych placement) for continued mental health treatment / stabilization.     - Continue Risperdal 2 mg PO QAM & 3 mg PO QHS and Depakote ER 500 mg PO QAM and 1250 mg PO QHS for Bipolar I Disorder and insomnia (discussed risks/benefits/alt vs no treatment with patient)     - Continue Vistaril 50 mg PO TID (change to scheduled) for anxiety and/or insomnia (discussed risks/benefits/alt vs no treatment with patient)     - HOLD previously outpt prescribed Vyvanse (discussed risks/benefits/alt vs no treatment with patient)     - Can use Zyprexa 10 mg PO/IM q8 hours PRN for non-redirectable psychotic / manic agitation (patient appears to have slept better with PRN Zyprexa last night) (discussed risks/benefits/alt vs no treatment with patient)     - Psychotherapy was again performed with patient as noted above with a focus on improving mood / bibi / thought process and sleep.     - I spoke to the patient's sister and her step-daughter yesterday.  They both are working to encourage the patient to comply with recommended treatment at this time.  Patient's sister states that the current psychosis / mood elements are not consistent with patient's baseline neuropsychiatric status.       - Patient's most recent labs, imaging, and EKG were reviewed again today ; VPA level on 04/16/2022 was 64.3 ; Get repeat trough VPA level tomorrow (Saturday) AM; monitor sodium & potassium status (management per primary team &  nephrology) (improving on labs this AM)     - Continue suicide / violence precautions and continue to monitor the patient with a sitter while on a PEC / CEC / CHANCE filed status.       - Thank you for this consult ; our team will continue to follow patient         Total time spent with patient and/or managing/coordinating patient's care today (excluding the time spent on psychotherapy): 40 minutes   Time spent on psychotherapy today (as noted above): 17 minutes   Total time for encounter today including psychotherapy: 57 minutes      More than 50% of the time was spent counseling/coordinating care.     Consulting clinician was informed of the encounter and consult note.      STAFF:  Magdiel Del Real MD  [facility]  4/22/2022

## 2022-04-22 NOTE — SUBJECTIVE & OBJECTIVE
Interval History: Tachycardic to 120 with other VSS. Leukocytosis improving. On zosyn and vancomycin.    Review of Systems   Constitutional:  Negative for appetite change and fever.   HENT:  Negative for congestion, rhinorrhea and sore throat.    Eyes:  Negative for photophobia and visual disturbance.   Respiratory:  Negative for cough and shortness of breath.    Cardiovascular:  Positive for leg swelling. Negative for chest pain.   Gastrointestinal:  Negative for abdominal pain, constipation, diarrhea, nausea and vomiting.   Genitourinary:  Negative for dysuria and hematuria.   Musculoskeletal:  Positive for arthralgias.   Skin:  Positive for color change and wound.   Neurological:  Negative for speech difficulty, weakness and headaches.   Psychiatric/Behavioral:  Positive for agitation and behavioral problems. Negative for confusion and suicidal ideas. The patient is nervous/anxious.    Objective:     Vital Signs (Most Recent):  Temp: 96.3 °F (35.7 °C) (04/22/22 0742)  Pulse: 100 (04/22/22 0755)  Resp: 18 (04/22/22 0755)  BP: 128/67 (04/22/22 0742)  SpO2: 95 % (04/22/22 0755) Vital Signs (24h Range):  Temp:  [96.2 °F (35.7 °C)-99.4 °F (37.4 °C)] 96.3 °F (35.7 °C)  Pulse:  [] 100  Resp:  [18-20] 18  SpO2:  [95 %-100 %] 95 %  BP: (128-192)/(61-81) 128/67     Weight: 107.8 kg (237 lb 10.5 oz)  Body mass index is 39.55 kg/m².    Intake/Output Summary (Last 24 hours) at 4/22/2022 1047  Last data filed at 4/22/2022 0900  Gross per 24 hour   Intake 732.04 ml   Output 2020 ml   Net -1287.96 ml      Physical Exam  Vitals and nursing note reviewed.   Constitutional:       General: She is not in acute distress.     Appearance: She is obese. She is not toxic-appearing.   HENT:      Head: Normocephalic and atraumatic.      Nose: Nose normal.      Mouth/Throat:      Mouth: Mucous membranes are moist.   Eyes:      Pupils: Pupils are equal, round, and reactive to light.   Cardiovascular:      Rate and Rhythm: Regular  rhythm. Tachycardia present.      Pulses: Normal pulses.      Heart sounds: Normal heart sounds.   Pulmonary:      Effort: Pulmonary effort is normal.      Breath sounds: Normal breath sounds.   Abdominal:      General: Bowel sounds are normal.      Palpations: Abdomen is soft.      Tenderness: There is abdominal tenderness (lower abdomen).   Musculoskeletal:         General: Tenderness present. Normal range of motion.      Cervical back: Normal range of motion.      Right lower leg: No edema.      Left lower leg: No edema.   Skin:     General: Skin is warm and dry.      Comments: Both feet bandaged   Neurological:      Mental Status: She is alert and oriented to person, place, and time.   Psychiatric:         Behavior: Behavior normal.         Thought Content: Thought content normal.       Significant Labs: All pertinent labs within the past 24 hours have been reviewed.  BMP:   Recent Labs   Lab 04/22/22  0450   *   *   K 4.9   CL 96   CO2 27   BUN 26*   CREATININE 0.8   CALCIUM 10.8*     CBC:   Recent Labs   Lab 04/20/22  1312 04/21/22  0714 04/21/22  1431 04/22/22  0450   WBC 17.68* 15.65*  --  13.21*   HGB 10.5* 10.9*  --  10.0*   HCT 32.4* 34.3* 32* 30.4*   * 807*  --  806*     CMP:   Recent Labs   Lab 04/21/22  0714 04/21/22  1333 04/22/22  0450   * 132* 134*   K 6.7* 5.7* 4.9   CL 95 96 96   CO2 19* 24 27   * 258* 231*   BUN 48* 41* 26*   CREATININE 1.3 1.0 0.8   CALCIUM 11.4* 10.4 10.8*   PROT  --   --  7.2   ALBUMIN 3.6 3.0* 3.1*   BILITOT  --   --  0.3   ALKPHOS  --   --  69   AST  --   --  11   ALT  --   --  14   ANIONGAP 16 12 11   EGFRNONAA 48* >60 >60       Significant Imaging: I have reviewed all pertinent imaging results/findings within the past 24 hours.

## 2022-04-22 NOTE — PROGRESS NOTES
[facility] Medicine  Progress Note    Patient Name: Audrey Natarajan  MRN: [id number]  Patient Class: IP- Inpatient   Admission Date: 4/12/2022  Length of Stay: 4 days  Attending Physician: Aniya Mcintosh MD  Primary Care Provider: Donaldo Pena MD        Subjective:     Principal Problem:Diabetic foot ulcer associated with type 2 diabetes mellitus        HPI:  Audrey Natarajan is a 52 yo female with a pmh of DM2, bipolar 1 disorder, cellulitis, COPD, HTN, CAD, DVT/PE. She presented to the ED with c/o fevers x 1 day. She also has BLE swelling and pain and wounds to both feet, worsening x 2 weeks. She had fever up to 102F yesterday. She reports the wounds to her right foot are from dragging her feet while she was angry. Denies drainage to foot wounds. She has had blood clots in the past and has an IVC filter placed in 2012. She was sent here from [facility] where she was under CEC for psychosis. ED workup revealed BLE DVT on venous US, WBC 31, and Hgb 9.9. She received a dose of vancomycin while in the ED.       Overview/Hospital Course:  Admitted for diabetic foot infection, cellulitis, and DVT. Patient is now in a Judicial Commitment Petition Filed Status (filed on 04/21/2022) due to continued grave disability 2/2 mental illness at this time (will have court paperwork scanned into chart once received from the hospital ). History of DVT/PE, hypercoagulable state, IVC filter in place. Therapeutic lovenox initiated, transitioned to eliquis. Podiatry consulted for diabetic foot ulcer, debrided on 4/13 with cultures obtained - growing MRSA. Started on IV Clindamycin, switched to bactrim with flagyl, with further discontinuation of bactrim and initiation of vancomycin. ID consulted to follow for antibiotic regimen. Psych consulted for medication evaluation, pt with CEC from behavioral health facility. Now a Judicial Commitment Petition Filed Status (filed 4/21) due to  continued grave disability 2/2 mental illness at this time (will have court paperwork scanned into chart once received from the hospital ) facility. Developed ODESSA with increase in Cr to 2.4 as well as hyperkalemia, nephrology consulted for assistance in management. ODESSA and hyperkalemia have since resolved (4/22). Renal US showed minimally dilated central renal collecting system on the left and mildly elevated resistive indices, findings which may be seen in setting of medical renal disease. Heme/onc consulted given iliofemoral lymphadenopathy, possibly reactive or neoplastic identified on CT abd/pelvis on 4/19, likely secondary to panniculitis, no further work up needed.      Interval History: Tachycardic to 120 with other VSS. Leukocytosis improving. On zosyn and vancomycin.    Review of Systems   Constitutional:  Negative for appetite change and fever.   HENT:  Negative for congestion, rhinorrhea and sore throat.    Eyes:  Negative for photophobia and visual disturbance.   Respiratory:  Negative for cough and shortness of breath.    Cardiovascular:  Positive for leg swelling. Negative for chest pain.   Gastrointestinal:  Negative for abdominal pain, constipation, diarrhea, nausea and vomiting.   Genitourinary:  Negative for dysuria and hematuria.   Musculoskeletal:  Positive for arthralgias.   Skin:  Positive for color change and wound.   Neurological:  Negative for speech difficulty, weakness and headaches.   Psychiatric/Behavioral:  Positive for agitation and behavioral problems. Negative for confusion and suicidal ideas. The patient is nervous/anxious.    Objective:     Vital Signs (Most Recent):  Temp: 96.3 °F (35.7 °C) (04/22/22 0742)  Pulse: 100 (04/22/22 0755)  Resp: 18 (04/22/22 0755)  BP: 128/67 (04/22/22 0742)  SpO2: 95 % (04/22/22 0755) Vital Signs (24h Range):  Temp:  [96.2 °F (35.7 °C)-99.4 °F (37.4 °C)] 96.3 °F (35.7 °C)  Pulse:  [] 100  Resp:  [18-20] 18  SpO2:  [95 %-100 %] 95 %  BP:  (128-192)/(61-81) 128/67     Weight: 107.8 kg (237 lb 10.5 oz)  Body mass index is 39.55 kg/m².    Intake/Output Summary (Last 24 hours) at 4/22/2022 1047  Last data filed at 4/22/2022 0900  Gross per 24 hour   Intake 732.04 ml   Output 2020 ml   Net -1287.96 ml      Physical Exam  Vitals and nursing note reviewed.   Constitutional:       General: She is not in acute distress.     Appearance: She is obese. She is not toxic-appearing.   HENT:      Head: Normocephalic and atraumatic.      Nose: Nose normal.      Mouth/Throat:      Mouth: Mucous membranes are moist.   Eyes:      Pupils: Pupils are equal, round, and reactive to light.   Cardiovascular:      Rate and Rhythm: Regular rhythm. Tachycardia present.      Pulses: Normal pulses.      Heart sounds: Normal heart sounds.   Pulmonary:      Effort: Pulmonary effort is normal.      Breath sounds: Normal breath sounds.   Abdominal:      General: Bowel sounds are normal.      Palpations: Abdomen is soft.      Tenderness: There is abdominal tenderness (lower abdomen).   Musculoskeletal:         General: Tenderness present. Normal range of motion.      Cervical back: Normal range of motion.      Right lower leg: No edema.      Left lower leg: No edema.   Skin:     General: Skin is warm and dry.      Comments: Both feet bandaged   Neurological:      Mental Status: She is alert and oriented to person, place, and time.   Psychiatric:         Behavior: Behavior normal.         Thought Content: Thought content normal.       Significant Labs: All pertinent labs within the past 24 hours have been reviewed.  BMP:   Recent Labs   Lab 04/22/22  0450   *   *   K 4.9   CL 96   CO2 27   BUN 26*   CREATININE 0.8   CALCIUM 10.8*     CBC:   Recent Labs   Lab 04/20/22  1312 04/21/22  0714 04/21/22  1431 04/22/22  0450   WBC 17.68* 15.65*  --  13.21*   HGB 10.5* 10.9*  --  10.0*   HCT 32.4* 34.3* 32* 30.4*   * 807*  --  806*     CMP:   Recent Labs   Lab 04/21/22  0714  04/21/22  1333 04/22/22  0450   * 132* 134*   K 6.7* 5.7* 4.9   CL 95 96 96   CO2 19* 24 27   * 258* 231*   BUN 48* 41* 26*   CREATININE 1.3 1.0 0.8   CALCIUM 11.4* 10.4 10.8*   PROT  --   --  7.2   ALBUMIN 3.6 3.0* 3.1*   BILITOT  --   --  0.3   ALKPHOS  --   --  69   AST  --   --  11   ALT  --   --  14   ANIONGAP 16 12 11   EGFRNONAA 48* >60 >60       Significant Imaging: I have reviewed all pertinent imaging results/findings within the past 24 hours.      Assessment/Plan:      * Diabetic foot ulcer associated with type 2 diabetes mellitus  B/l foot ulcers  - Podiatry consulted,  - Xray in feet bilaterally, findings consistent with Charcot joint of left foot, no acute abnormality   - MRI ordered edema noted as well as charcot changes of left foot without evidence of osteomyelitis  - LLE ulcer debrided with deep cultures taken.   - Site dressed with xeroform and kerlix. Nursing orders in for daily dressing changes  - She is NWB to LLE due to ulcer and history of charcot foot.  - Will continue to follow  - Cont clindamycin - culture growing Staph aureus--sensitive to clinda, switched   - Cultures grew out MRSA and anerobic bacteria; patient is septic today and rising WBC; switched clinda to bactrim and adding flagyl;       - Leukocytosis improving today  - Continue vancomycin and flagyl  - Follow up on ID and podiatry recs    Lymphadenopathy, inguinal  See Panniculitis    Hyperkalemia  Developed during admission, increased to 5.4 and to 5.7    - Given Lokelma  - Nephrology folowing  - Renal US completed to rule out obstruction    Panniculitis  Lymphadenopathy, inguinal  Identified on CT abdomen pelvis on 4/19, no underlying hematoma or abscess    - Heme/Onc consulted, recommendations as follows  - likely secondary to Panniculitis  - no further workup needed    ODESSA (acute kidney injury)  No history of CKD  Creatinine baseline around 0.6-0.7, developed during admission likely secondary to bactrim use,  increased to 2.4 and trended down to 1.0  US kidney showed renal US showed minimally dilated central renal collecting system on the left and mildly elevated resistive indices, findings which may be seen in setting of medical renal disease    - Currently resolved  - Daily Renal Function Panel  - Check Vanc levels before each dose  - Need strict I&Os  - Hold off on serological work up for now,  get basic pending labs today   - No Indication for RRT at this time, K+ acceptable, No Uremia symptoms.  - Avoid Hypotension.  - Renally dose all meds  - Please avoid nephrotoxins, including NSAIDs, aminoglycosides, IV contrast (unless absolutely necessary), gadolinium, fleets and other phosphorous-based laxatives. Caution with antibiotics    Sepsis due to methicillin resistant Staphylococcus aureus (MRSA)  - see principle problem    Anemia  Denies bleeding, baseline 12-13, 9.9 on admit  Iron panel: Iron 13, TIBC 456, Sat Iron 3, Transferrin and Ferritin WNL    - Iron supplement  - Continue to monitor with daily CBC    Acute deep vein thrombosis (DVT) of both lower extremities  Hx of DVT/PE, hypercoagulable state, IVC filter in place  BLE venous US with thrombosis of bilateral posterior tibial veins  Initially started on therapeutic lovenox    - Continue Eliquis    SHAWN (obstructive sleep apnea)  Does not use CPAP    Psychosis  Bipolar 1 disorder    Per Psychiatry note,   - Patient is now in a Judicial Commitment Petition Filed Status (filed on 04/21/2022) due to continued grave disability 2/2 mental illness at this time (will have court paperwork scanned into chart once received from the hospital ).    - Once medically cleared / stable, seek transfer back to [facility] (or another inpatient psychiatric facility; patient may need Med-Psych placement) for continued mental health treatment / stabilization.  - Continue Risperdal 2 mg PO QAM & 3 mg PO QHS and Depakote ER 500 mg PO QAM and 1250 mg PO QHS for Bipolar I  Disorder and insomnia   - Continue Vistaril 50 mg PO TID (change to scheduled) for anxiety and/or insomnia   - HOLD previously outpt prescribed Vyvanse  - Can use Zyprexa 10 mg PO/IM q8 hours PRN for non-redirectable psychotic / manic agitation   - Get repeat trough VPA level tomorrow (Saturday) AM;  - Continue suicide / violence precautions and continue to monitor the patient with a sitter while on a PEC / CEC / CHANCE filed status.     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  Latest blood pressure and vitals reviewed-   Temp:  [96.2 °F (35.7 °C)-99.4 °F (37.4 °C)]   Pulse:  []   Resp:  [18-20]   BP: (128-192)/(61-81)   SpO2:  [95 %-100 %] .   Home meds for hypertension were reviewed and noted below.   Hypertension Medications             furosemide (LASIX) 20 MG tablet TAKE 1 TABLET(20 MG) BY MOUTH EVERY DAY    lisinopriL 10 MG tablet Take 1 tablet (10 mg total) by mouth once daily.    metoprolol tartrate (LOPRESSOR) 50 MG tablet TAKE 1 TABLET(50 MG) BY MOUTH TWICE DAILY        While in the hospital, will manage blood pressure as follows; Adjust home antihypertensive regimen as follows- Hold lisinopril and lasix in the setting of ODESSA, resume lopressor    Will utilize p.r.n. blood pressure medication only if patient's blood pressure  greater than  180/110 and she develops symptoms such as worsening chest pain or shortness of breath.      VTE Risk Mitigation (From admission, onward)         Ordered     apixaban tablet 10 mg  2 times daily         04/18/22 1859     IP VTE HIGH RISK PATIENT  Once         04/13/22 0246     Place sequential compression device  Until discontinued         04/13/22 0246                Discharge Planning   HUMBERTO: 4/18/2022     Code Status: Full Code   Is the patient medically ready for discharge?:     Reason for patient still in hospital (select all that apply): Patient trending condition  Discharge Plan A: Psychiatric hospital   Discharge Delays: (!) Other (IP Psych Placement)              Aniya Ramirez PA-C  Department of Hospital Medicine   [place] [facility]

## 2022-04-22 NOTE — PROGRESS NOTES
Pharmacokinetic Assessment Follow Up: IV Vancomycin    Vancomycin serum concentration assessment(s):    The trough level was drawn correctly and can be used to guide therapy at this time. The measurement is within the desired definitive target range of 10 to 15 mcg/mL.    Vancomycin Regimen Plan:    Continue regimen to Vancomycin 1250 mg IV every 12 hours with next serum trough concentration measured at 0200 prior to fourth dose on 04/24/2022    Drug levels (last 3 results):  Recent Labs   Lab Result Units 04/20/22  1540 04/21/22  1333 04/22/22  1506   Vancomycin, Random ug/mL 30.3  --   --    Vancomycin-Trough ug/mL  --  5.3* 10.7       Pharmacy will continue to follow and monitor vancomycin.    Please contact pharmacy at extension [phone] for questions regarding this assessment.    Thank you for the consult,   Helena Higuera       Patient brief summary:  Audrey Natarajan is a 51 y.o. female initiated on antimicrobial therapy with IV Vancomycin for treatment of  diabetic foot infection    The patient's current regimen is vancomycin 1250mg ivpb q12h    Drug Allergies:   Review of patient's allergies indicates:   Allergen Reactions    Morphine Other (See Comments)     Patient had a psychotic episode after taking Morphine  Agitation, hallucinations    Penicillins Anaphylaxis     itching    Januvia [sitagliptin] Hives       Actual Body Weight:   107.8    Renal Function:   Estimated Creatinine Clearance: 103.8 mL/min (based on SCr of 0.8 mg/dL).,     Dialysis Method (if applicable):  N/A    CBC (last 72 hours):  Recent Labs   Lab Result Units 04/20/22  1312 04/21/22  0714 04/22/22  0450   WBC K/uL 17.68* 15.65* 13.21*   Hemoglobin g/dL 10.5* 10.9* 10.0*   Hematocrit % 32.4* 34.3* 30.4*   Platelets K/uL 746* 807* 806*   Gran % % 58.4 64.0 60.1   Lymph % % 22.1 19.3 22.2   Mono % % 12.6 11.1 12.7   Eosinophil % % 2.3 1.9 2.3   Basophil % % 0.5 0.6 0.5   Differential Method  Automated Automated Automated        Metabolic Panel (last 72 hours):  Recent Labs   Lab Result Units 04/19/22  1745 04/20/22  0501 04/20/22  1345 04/21/22  0714 04/21/22  1333 04/22/22  0450   Sodium mmol/L  --  130*  --  130* 132* 134*   Sodium, Urine mmol/L  --   --  41  41  --   --   --    Potassium mmol/L 4.6 5.4*  --  6.7* 5.7* 4.9   Chloride mmol/L  --  99  --  95 96 96   CO2 mmol/L  --  15*  --  19* 24 27   Glucose mg/dL  --  160*  --  208* 258* 231*   BUN mg/dL  --  49*  --  48* 41* 26*   Creatinine mg/dL  --  2.4*  --  1.3 1.0 0.8   Creatinine, Urine mg/dL  --   --  48.5  48.5  48.5  --   --   --    Albumin g/dL  --  2.9*  --  3.6 3.0* 3.1*   Total Bilirubin mg/dL  --  0.2  --   --   --  0.3   Alkaline Phosphatase U/L  --  61  --   --   --  69   AST U/L  --  22  --   --   --  11   ALT U/L  --  18  --   --   --  14   Phosphorus mg/dL  --   --   --  3.9 3.0  --        Vancomycin Administrations:  vancomycin given in the last 96 hours                     vancomycin 1.25 g in dextrose 5% 250 mL IVPB (ready to mix) (mg) 1,250 mg New Bag 04/22/22 1513     1,250 mg New Bag  0210      Restarted 04/21/22 1535      Restarted  1503     1,250 mg New Bag  1500    vancomycin (VANCOCIN) 2,000 mg in dextrose 5 % 500 mL IVPB (mg) 2,000 mg New Bag 04/20/22 1436                    Microbiologic Results:  Microbiology Results (last 7 days)       Procedure Component Value Units Date/Time    Blood culture [[id number]] Collected: 04/18/22 1946    Order Status: Completed Specimen: Blood from Antecubital, Right Arm Updated: 04/22/22 0612     Blood Culture, Routine No Growth to date      No Growth to date      No Growth to date      No Growth to date    Blood culture [[id number]] Collected: 04/18/22 1946    Order Status: Completed Specimen: Blood from Antecubital, Left Arm Updated: 04/22/22 0612     Blood Culture, Routine No Growth to date      No Growth to date      No Growth to date      No Growth to date    Culture, Anaerobe [[id number]]  (Abnormal)  Collected: 04/13/22 1234    Order Status: Completed Specimen: Wound from Foot, Left Updated: 04/18/22 1226     Anaerobic Culture FINEGOLDIA MAGNA  Moderate      Blood culture [[id number]] Collected: 04/13/22 0309    Order Status: Completed Specimen: Blood from Peripheral, Antecubital, Left Updated: 04/18/22 1212     Blood Culture, Routine No growth after 5 days.    Blood culture [[id number]] Collected: 04/13/22 0309    Order Status: Completed Specimen: Blood from Peripheral, Antecubital, Left Updated: 04/18/22 1212     Blood Culture, Routine No growth after 5 days.    Aerobic culture [[id number]]  (Abnormal)  (Susceptibility) Collected: 04/13/22 1234    Order Status: Completed Specimen: Wound from Foot, Left Updated: 04/16/22 1201     Aerobic Bacterial Culture METHICILLIN RESISTANT STAPHYLOCOCCUS AUREUS  Many  Skin rosenda also present

## 2022-04-23 LAB
ACANTHOCYTES BLD QL SMEAR: PRESENT
ALBUMIN SERPL BCP-MCNC: 2.8 G/DL (ref 3.5–5.2)
ALP SERPL-CCNC: 62 U/L (ref 55–135)
ALT SERPL W/O P-5'-P-CCNC: 13 U/L (ref 10–44)
ANION GAP SERPL CALC-SCNC: 10 MMOL/L (ref 8–16)
ANISOCYTOSIS BLD QL SMEAR: SLIGHT
AST SERPL-CCNC: 8 U/L (ref 10–40)
BACTERIA #/AREA URNS HPF: ABNORMAL /HPF
BASOPHILS # BLD AUTO: 0.08 K/UL (ref 0–0.2)
BASOPHILS NFR BLD: 0.5 % (ref 0–1.9)
BILIRUB SERPL-MCNC: 0.2 MG/DL (ref 0.1–1)
BILIRUB UR QL STRIP: NEGATIVE
BUN SERPL-MCNC: 26 MG/DL (ref 6–20)
BURR CELLS BLD QL SMEAR: ABNORMAL
CALCIUM SERPL-MCNC: 9.7 MG/DL (ref 8.7–10.5)
CHLORIDE SERPL-SCNC: 95 MMOL/L (ref 95–110)
CLARITY UR: CLEAR
CO2 SERPL-SCNC: 28 MMOL/L (ref 23–29)
COLOR UR: YELLOW
CREAT SERPL-MCNC: 0.8 MG/DL (ref 0.5–1.4)
DACRYOCYTES BLD QL SMEAR: ABNORMAL
DIFFERENTIAL METHOD: ABNORMAL
EOSINOPHIL # BLD AUTO: 0.4 K/UL (ref 0–0.5)
EOSINOPHIL NFR BLD: 2.8 % (ref 0–8)
ERYTHROCYTE [DISTWIDTH] IN BLOOD BY AUTOMATED COUNT: 15.8 % (ref 11.5–14.5)
EST. GFR  (AFRICAN AMERICAN): >60 ML/MIN/1.73 M^2
EST. GFR  (NON AFRICAN AMERICAN): >60 ML/MIN/1.73 M^2
GLUCOSE SERPL-MCNC: 252 MG/DL (ref 70–110)
GLUCOSE UR QL STRIP: ABNORMAL
HCT VFR BLD AUTO: 28.2 % (ref 37–48.5)
HGB BLD-MCNC: 9.1 G/DL (ref 12–16)
HGB UR QL STRIP: NEGATIVE
HYPOCHROMIA BLD QL SMEAR: ABNORMAL
IMM GRANULOCYTES # BLD AUTO: 0.22 K/UL (ref 0–0.04)
IMM GRANULOCYTES NFR BLD AUTO: 1.5 % (ref 0–0.5)
KETONES UR QL STRIP: ABNORMAL
LEUKOCYTE ESTERASE UR QL STRIP: ABNORMAL
LYMPHOCYTES # BLD AUTO: 4.6 K/UL (ref 1–4.8)
LYMPHOCYTES NFR BLD: 30.5 % (ref 18–48)
MCH RBC QN AUTO: 27.3 PG (ref 27–31)
MCHC RBC AUTO-ENTMCNC: 32.3 G/DL (ref 32–36)
MCV RBC AUTO: 85 FL (ref 82–98)
MICROSCOPIC COMMENT: ABNORMAL
MONOCYTES # BLD AUTO: 2 K/UL (ref 0.3–1)
MONOCYTES NFR BLD: 13.1 % (ref 4–15)
NEUTROPHILS # BLD AUTO: 7.8 K/UL (ref 1.8–7.7)
NEUTROPHILS NFR BLD: 51.6 % (ref 38–73)
NITRITE UR QL STRIP: NEGATIVE
NRBC BLD-RTO: 0 /100 WBC
OVALOCYTES BLD QL SMEAR: ABNORMAL
PH UR STRIP: 6 [PH] (ref 5–8)
PLATELET # BLD AUTO: 751 K/UL (ref 150–450)
PLATELET BLD QL SMEAR: ABNORMAL
PMV BLD AUTO: 10 FL (ref 9.2–12.9)
POCT GLUCOSE: 225 MG/DL (ref 70–110)
POCT GLUCOSE: 228 MG/DL (ref 70–110)
POCT GLUCOSE: 270 MG/DL (ref 70–110)
POIKILOCYTOSIS BLD QL SMEAR: SLIGHT
POLYCHROMASIA BLD QL SMEAR: ABNORMAL
POTASSIUM SERPL-SCNC: 5.2 MMOL/L (ref 3.5–5.1)
PROT SERPL-MCNC: 6.5 G/DL (ref 6–8.4)
PROT UR QL STRIP: ABNORMAL
RBC # BLD AUTO: 3.33 M/UL (ref 4–5.4)
RBC #/AREA URNS HPF: 1 /HPF (ref 0–4)
SODIUM SERPL-SCNC: 133 MMOL/L (ref 136–145)
SP GR UR STRIP: 1.02 (ref 1–1.03)
SQUAMOUS #/AREA URNS HPF: 3 /HPF
TARGETS BLD QL SMEAR: ABNORMAL
URN SPEC COLLECT METH UR: ABNORMAL
UROBILINOGEN UR STRIP-ACNC: NEGATIVE EU/DL
VALPROATE SERPL-MCNC: 65.9 UG/ML (ref 50–100)
WBC # BLD AUTO: 15.17 K/UL (ref 3.9–12.7)
WBC #/AREA URNS HPF: 11 /HPF (ref 0–5)

## 2022-04-23 PROCEDURE — 81000 URINALYSIS NONAUTO W/SCOPE: CPT | Performed by: HOSPITALIST

## 2022-04-23 PROCEDURE — 25000003 PHARM REV CODE 250

## 2022-04-23 PROCEDURE — 11000001 HC ACUTE MED/SURG PRIVATE ROOM

## 2022-04-23 PROCEDURE — 25000003 PHARM REV CODE 250: Performed by: NURSE PRACTITIONER

## 2022-04-23 PROCEDURE — 25000003 PHARM REV CODE 250: Performed by: HOSPITALIST

## 2022-04-23 PROCEDURE — S4991 NICOTINE PATCH NONLEGEND: HCPCS | Performed by: HOSPITALIST

## 2022-04-23 PROCEDURE — 94761 N-INVAS EAR/PLS OXIMETRY MLT: CPT

## 2022-04-23 PROCEDURE — 36415 COLL VENOUS BLD VENIPUNCTURE: CPT | Performed by: PSYCHIATRY & NEUROLOGY

## 2022-04-23 PROCEDURE — 80053 COMPREHEN METABOLIC PANEL: CPT

## 2022-04-23 PROCEDURE — S0166 INJ OLANZAPINE 2.5MG: HCPCS | Performed by: NURSE PRACTITIONER

## 2022-04-23 PROCEDURE — 80164 ASSAY DIPROPYLACETIC ACD TOT: CPT | Performed by: PSYCHIATRY & NEUROLOGY

## 2022-04-23 PROCEDURE — 25000003 PHARM REV CODE 250: Performed by: STUDENT IN AN ORGANIZED HEALTH CARE EDUCATION/TRAINING PROGRAM

## 2022-04-23 PROCEDURE — 27000207 HC ISOLATION

## 2022-04-23 PROCEDURE — 99900035 HC TECH TIME PER 15 MIN (STAT)

## 2022-04-23 PROCEDURE — 87086 URINE CULTURE/COLONY COUNT: CPT | Performed by: HOSPITALIST

## 2022-04-23 PROCEDURE — 25000003 PHARM REV CODE 250: Performed by: PSYCHIATRY & NEUROLOGY

## 2022-04-23 PROCEDURE — 63600175 PHARM REV CODE 636 W HCPCS: Performed by: HOSPITALIST

## 2022-04-23 PROCEDURE — 85025 COMPLETE CBC W/AUTO DIFF WBC: CPT

## 2022-04-23 PROCEDURE — 94640 AIRWAY INHALATION TREATMENT: CPT

## 2022-04-23 PROCEDURE — 94760 N-INVAS EAR/PLS OXIMETRY 1: CPT

## 2022-04-23 RX ADMIN — METOPROLOL TARTRATE 50 MG: 50 TABLET, FILM COATED ORAL at 09:04

## 2022-04-23 RX ADMIN — MUPIROCIN: 20 OINTMENT TOPICAL at 09:04

## 2022-04-23 RX ADMIN — METRONIDAZOLE 500 MG: 500 TABLET ORAL at 09:04

## 2022-04-23 RX ADMIN — DIVALPROEX SODIUM 500 MG: 250 TABLET, DELAYED RELEASE ORAL at 09:04

## 2022-04-23 RX ADMIN — OLANZAPINE 10 MG: 10 INJECTION, POWDER, FOR SOLUTION INTRAMUSCULAR at 05:04

## 2022-04-23 RX ADMIN — VANCOMYCIN HYDROCHLORIDE 1250 MG: 1.25 INJECTION, POWDER, LYOPHILIZED, FOR SOLUTION INTRAVENOUS at 03:04

## 2022-04-23 RX ADMIN — Medication 400 MG: at 09:04

## 2022-04-23 RX ADMIN — HYDROXYZINE PAMOATE 50 MG: 25 CAPSULE ORAL at 09:04

## 2022-04-23 RX ADMIN — MICONAZOLE NITRATE 2 % TOPICAL POWDER: at 09:04

## 2022-04-23 RX ADMIN — FAMOTIDINE 20 MG: 20 TABLET ORAL at 09:04

## 2022-04-23 RX ADMIN — NICOTINE 1 PATCH: 21 PATCH, EXTENDED RELEASE TRANSDERMAL at 09:04

## 2022-04-23 RX ADMIN — HYDROCODONE BITARTRATE AND ACETAMINOPHEN 1 TABLET: 10; 325 TABLET ORAL at 02:04

## 2022-04-23 RX ADMIN — DIVALPROEX SODIUM 1250 MG: 250 TABLET, DELAYED RELEASE ORAL at 09:04

## 2022-04-23 RX ADMIN — ACETAMINOPHEN 650 MG: 325 TABLET ORAL at 12:04

## 2022-04-23 RX ADMIN — FLUTICASONE FUROATE AND VILANTEROL TRIFENATATE 1 PUFF: 100; 25 POWDER RESPIRATORY (INHALATION) at 08:04

## 2022-04-23 RX ADMIN — PRAVASTATIN SODIUM 40 MG: 40 TABLET ORAL at 09:04

## 2022-04-23 RX ADMIN — INSULIN DETEMIR 10 UNITS: 100 INJECTION, SOLUTION SUBCUTANEOUS at 09:04

## 2022-04-23 RX ADMIN — INSULIN ASPART 3 UNITS: 100 INJECTION, SOLUTION INTRAVENOUS; SUBCUTANEOUS at 09:04

## 2022-04-23 RX ADMIN — ASPIRIN 81 MG CHEWABLE TABLET 81 MG: 81 TABLET CHEWABLE at 09:04

## 2022-04-23 RX ADMIN — APIXABAN 10 MG: 5 TABLET, FILM COATED ORAL at 09:04

## 2022-04-23 RX ADMIN — RISPERIDONE 3 MG: 1 TABLET, ORALLY DISINTEGRATING ORAL at 09:04

## 2022-04-23 RX ADMIN — GABAPENTIN 300 MG: 300 CAPSULE ORAL at 09:04

## 2022-04-23 RX ADMIN — METRONIDAZOLE 500 MG: 500 TABLET ORAL at 05:04

## 2022-04-23 RX ADMIN — VANCOMYCIN HYDROCHLORIDE 1250 MG: 1.25 INJECTION, POWDER, LYOPHILIZED, FOR SOLUTION INTRAVENOUS at 02:04

## 2022-04-23 RX ADMIN — HYDROXYZINE PAMOATE 50 MG: 25 CAPSULE ORAL at 02:04

## 2022-04-23 RX ADMIN — METRONIDAZOLE 500 MG: 500 TABLET ORAL at 02:04

## 2022-04-23 RX ADMIN — RISPERIDONE 2 MG: 1 TABLET, ORALLY DISINTEGRATING ORAL at 09:04

## 2022-04-23 NOTE — PLAN OF CARE
Plan of care reviewed with the patient. Scheduled medicines given and the patient tolerated well. Given Hydrocodone- Acetaminophen  for headache 8/10. Also received an IM injection Olanzapine 10mg for agitation and restlessness. Fall and safety interventions are in place. Acu check was 243 mg/dl, covered with 2 Units of Insulin Aspart. Advised the patient to call for assistance. Continued monitoring the patient.

## 2022-04-23 NOTE — PHARMACY MED REC
"Admission Medication History     The home medication history was taken by Josefina Gaitan PharmD.    Medication history obtained from patient    You may go to "Admission" then "Reconcile Home Medications" tabs to review and/or act upon these items.      The home medication list has been updated by the Pharmacy department.    Please read ALL comments highlighted in yellow.    Please address this information as you see fit.     Feel free to contact us if you have any questions or require assistance.      The medications listed below were removed from the home medication list.  Please reorder if appropriate:   Patient reports NOT TAKING the following medication(s):  o Folic acid    o Patient reports he/she IS TAKING the following which was not ordered upon admit  o dupixent pen 300mg/2ml  Inject 300mg every 2 weeks  o Famotidine 20mg twice daily  o Furosemide 20mg daily  o Lisinopril 10mg daily  o Metformin 1000mg twice daily  o Pantoprazole 40mg daily        Hitesh VanegasD.                .          "

## 2022-04-23 NOTE — ASSESSMENT & PLAN NOTE
Bipolar 1 disorder    Per Psychiatry note,   - Patient is now in a Judicial Commitment Petition Filed Status (filed on 04/21/2022) due to continued grave disability 2/2 mental illness at this time (will have court paperwork scanned into chart once received from the hospital ).    - Once medically cleared / stable, seek transfer back to [facility] (or another inpatient psychiatric facility; patient may need Med-Psych placement) for continued mental health treatment / stabilization.  - Continue Risperdal 2 mg PO QAM & 3 mg PO QHS and Depakote ER 500 mg PO QAM and 1250 mg PO QHS for Bipolar I Disorder and insomnia   - Continue Vistaril 50 mg PO TID (change to scheduled) for anxiety and/or insomnia   - HOLD previously outpt prescribed Vyvanse  - Can use Zyprexa 10 mg PO/IM q8 hours PRN for non-redirectable psychotic / manic agitation   - Get repeat trough VPA level tomorrow (Saturday) AM;  - Continue suicide / violence precautions and continue to monitor the patient with a sitter while on a PEC / CEC / CHANCE filed status.   - plan to return to [facility] of following clinical stabilization

## 2022-04-23 NOTE — PLAN OF CARE
Plan of care reviewed. Patient up alert and orient. Patient is angry and yelling at staff. Patient states she would like for family members to come and see her. Patient also requesting disharged right now. Scheduled morning medications given and tolerated with exception of receiving insulin. Patient declined sliding scale insulin. Patient had no complaints of pain. No distress noted. Patient sitting up in chair watching television. Will continue to monitor.

## 2022-04-23 NOTE — SUBJECTIVE & OBJECTIVE
Interval History:  No acute events overnight.  Security was called today, however patient redirected.  Leukocytosis fluctuating.  Remains on Zosyn and vancomycin    Review of Systems   Constitutional:  Negative for appetite change and fever.   HENT:  Negative for congestion, rhinorrhea and sore throat.    Eyes:  Negative for photophobia and visual disturbance.   Respiratory:  Negative for cough and shortness of breath.    Cardiovascular:  Positive for leg swelling. Negative for chest pain.   Gastrointestinal:  Negative for abdominal pain, constipation, diarrhea, nausea and vomiting.   Genitourinary:  Negative for dysuria and hematuria.   Musculoskeletal:  Positive for arthralgias.   Skin:  Positive for color change and wound.   Neurological:  Negative for speech difficulty, weakness and headaches.   Psychiatric/Behavioral:  Positive for agitation and behavioral problems. Negative for confusion and suicidal ideas. The patient is nervous/anxious.    Objective:     Vital Signs (Most Recent):  Temp: 98.5 °F (36.9 °C) (04/23/22 1210)  Pulse: 84 (04/23/22 1210)  Resp: 18 (04/23/22 1210)  BP: (!) 107/54 (04/23/22 1210)  SpO2: (!) 94 % (04/23/22 1210) Vital Signs (24h Range):  Temp:  [96.3 °F (35.7 °C)-98.5 °F (36.9 °C)] 98.5 °F (36.9 °C)  Pulse:  [] 84  Resp:  [18-19] 18  SpO2:  [91 %-100 %] 94 %  BP: (107-134)/(54-63) 107/54     Weight: 107.8 kg (237 lb 10.5 oz)  Body mass index is 39.55 kg/m².  No intake or output data in the 24 hours ending 04/23/22 1309     Physical Exam  Vitals and nursing note reviewed.   Constitutional:       General: She is not in acute distress.     Appearance: She is obese. She is not toxic-appearing.   HENT:      Head: Normocephalic and atraumatic.      Nose: Nose normal.      Mouth/Throat:      Mouth: Mucous membranes are moist.   Eyes:      Pupils: Pupils are equal, round, and reactive to light.   Cardiovascular:      Rate and Rhythm: Regular rhythm. Tachycardia present.      Pulses:  Normal pulses.      Heart sounds: Normal heart sounds.   Pulmonary:      Effort: Pulmonary effort is normal.      Breath sounds: Normal breath sounds.   Abdominal:      General: Bowel sounds are normal.      Palpations: Abdomen is soft.      Tenderness: There is abdominal tenderness (lower abdomen).   Musculoskeletal:         General: Tenderness present. Normal range of motion.      Cervical back: Normal range of motion.      Right lower leg: No edema.      Left lower leg: No edema.   Skin:     General: Skin is warm and dry.      Comments: Both feet bandaged   Neurological:      Mental Status: She is alert and oriented to person, place, and time.   Psychiatric:         Behavior: Behavior normal.         Thought Content: Thought content normal.       Significant Labs: All pertinent labs within the past 24 hours have been reviewed.  BMP:   Recent Labs   Lab 04/23/22  0321   *   *   K 5.2*   CL 95   CO2 28   BUN 26*   CREATININE 0.8   CALCIUM 9.7       CBC:   Recent Labs   Lab 04/21/22  1431 04/22/22  0450 04/23/22  0321   WBC  --  13.21* 15.17*   HGB  --  10.0* 9.1*   HCT 32* 30.4* 28.2*   PLT  --  806* 751*       CMP:   Recent Labs   Lab 04/21/22  1333 04/22/22  0450 04/23/22  0321   * 134* 133*   K 5.7* 4.9 5.2*   CL 96 96 95   CO2 24 27 28   * 231* 252*   BUN 41* 26* 26*   CREATININE 1.0 0.8 0.8   CALCIUM 10.4 10.8* 9.7   PROT  --  7.2 6.5   ALBUMIN 3.0* 3.1* 2.8*   BILITOT  --  0.3 0.2   ALKPHOS  --  69 62   AST  --  11 8*   ALT  --  14 13   ANIONGAP 12 11 10   EGFRNONAA >60 >60 >60         Significant Imaging: I have reviewed all pertinent imaging results/findings within the past 24 hours.

## 2022-04-23 NOTE — PROGRESS NOTES
Nephrology Progress Note       Consult Requested By: Terry Mc,*  Reason for Consult: ODESSA     SUBJECTIVE:      ?    Review of Systems   Constitutional: Negative for chills and fever.   HENT: Negative for congestion and sore throat.    Eyes: Negative for blurred vision, double vision and photophobia.   Respiratory: Negative for cough and shortness of breath.    Cardiovascular: Negative for chest pain, palpitations and leg swelling.   Gastrointestinal: Negative for abdominal pain, diarrhea, nausea and vomiting.   Genitourinary: Negative for dysuria and urgency.   Musculoskeletal: Negative for joint pain and myalgias.   Skin: Negative for itching and rash.   Neurological: Negative for dizziness, sensory change, weakness and headaches.   Endo/Heme/Allergies: Negative for polydipsia. Does not bruise/bleed easily.   Psychiatric/Behavioral: Positive for depression. The patient is nervous/anxious.        Past Medical History:   Diagnosis Date    ADHD (attention deficit hyperactivity disorder)     Arthritis     Asthma     Bipolar 1 disorder     Cataract     COPD (chronic obstructive pulmonary disease)     Coronary artery disease     A fib    Depression     bipolar manic depresson    Diabetes mellitus     DVT of lower extremity, bilateral July 2013    bilateral LE DVT. [place] filter placed.     Encounter for blood transfusion     History of blood clots 1. Left Leg=2003; 2.Bilateral Groin=Blood Clots= 5 or 6/ 2013 & 7/2013; 3. LLL of Lung=7/2013;  4. Lt. Lower Leg=7/2013.     Pt. had 1st Blood Clot after Pdfoetmtuiqn=6344, & Last=2013. [place] Filter= Rt.Lateral Neck.    HTN (hypertension) 6/6/2013    Pt states that she does not have hypertension    Hypercholesteremia     Irregular heartbeat     Neuromuscular disorder     neuropathy feet    PE (pulmonary embolism) July 2013     bilat LE DVT.     Restless leg syndrome        OBJECTIVE:     Vital Signs (Most Recent)  Vitals:    04/23/22  0235 04/23/22 0406 04/23/22 0810 04/23/22 0815   BP:   126/63    BP Location:   Left arm    Patient Position:   Standing    Pulse:  99 101 100   Resp: 18  19 18   Temp:   98.1 °F (36.7 °C)    TempSrc:   Oral    SpO2:  (!) 94% 100% 100%   Weight:       Height:                     Medications:   apixaban  10 mg Oral BID    aspirin  81 mg Oral Daily    divalproex  1,250 mg Oral QHS    divalproex  500 mg Oral Daily    famotidine  20 mg Oral Daily    ferrous sulfate  1 tablet Oral Daily    fluticasone furoate-vilanteroL  1 puff Inhalation Daily    gabapentin  300 mg Oral BID    hydrOXYzine pamoate  50 mg Oral TID    insulin detemir U-100  10 Units Subcutaneous QHS    magnesium oxide  400 mg Oral BID    metoprolol tartrate  50 mg Oral BID    metroNIDAZOLE  500 mg Oral Q8H    miconazole NITRATE 2 %   Topical (Top) BID    mupirocin   Nasal BID    nicotine  1 patch Transdermal Daily    pravastatin  40 mg Oral QHS    risperiDONE  2 mg Oral Daily    risperiDONE  3 mg Oral Nightly    vancomycin (VANCOCIN) IVPB  1,250 mg Intravenous Q12H           Physical Exam  Vitals and nursing note reviewed.   Constitutional:       General: She is not in acute distress.     Appearance: She is obese. She is not diaphoretic.   HENT:      Head: Normocephalic and atraumatic.      Mouth/Throat:      Pharynx: No oropharyngeal exudate.   Eyes:      General: No scleral icterus.     Conjunctiva/sclera: Conjunctivae normal.      Pupils: Pupils are equal, round, and reactive to light.   Cardiovascular:      Rate and Rhythm: Normal rate and regular rhythm.      Heart sounds: Normal heart sounds. No murmur heard.  Pulmonary:      Effort: Pulmonary effort is normal. No respiratory distress.      Breath sounds: Normal breath sounds.   Abdominal:      General: Bowel sounds are normal. There is no distension.      Palpations: Abdomen is soft.      Tenderness: There is no abdominal tenderness.   Musculoskeletal:         General: Normal  range of motion.      Cervical back: Normal range of motion and neck supple.      Comments: Foot wound    Skin:     General: Skin is warm and dry.      Findings: No erythema.   Neurological:      Mental Status: She is alert and oriented to person, place, and time.      Cranial Nerves: No cranial nerve deficit.   Psychiatric:         Mood and Affect: Affect normal.         Cognition and Memory: Memory normal.         Judgment: Judgment normal.         Laboratory:  Recent Labs   Lab 04/21/22  0714 04/21/22  1431 04/22/22  0450 04/23/22  0321   WBC 15.65*  --  13.21* 15.17*   HGB 10.9*  --  10.0* 9.1*   HCT 34.3* 32* 30.4* 28.2*   *  --  806* 751*   MONO 11.1  1.7*  --  12.7  1.7* 13.1  2.0*     Recent Labs   Lab 04/17/22  1050 04/18/22  1246 04/21/22  0714 04/21/22  1333 04/22/22  0450 04/23/22  0321   *   < > 130* 132* 134* 133*   K 4.9   < > 6.7* 5.7* 4.9 5.2*   CL 96   < > 95 96 96 95   CO2 24   < > 19* 24 27 28   BUN 13   < > 48* 41* 26* 26*   CREATININE 0.7   < > 1.3 1.0 0.8 0.8   CALCIUM 10.1   < > 11.4* 10.4 10.8* 9.7   PHOS 4.3  --  3.9 3.0  --   --     < > = values in this interval not displayed.       Diagnostic Results:  X-Ray: Reviewed  US: Reviewed  Echo: Reviewed  ASSESSMENT/PLAN:     1. ODESSA - Cr 0.9 to 2.4 most likely  Due to Bactrim  - resolved   --   US kidney no obstruction      --   Vanc levels before each dose   Treat - infection, need strict I&O   -- Daily Renal Function Panel  -- Avoid Hypotension.  -- Renally dose all meds  -- Please avoid nephrotoxins, including NSAIDs, aminoglycosides, IV contrast (unless absolutely necessary), gadolinium, fleets and other phosphorous-based laxatives. Caution with antibiotics.  2. Hyperkalemia  -  Resolved stop  lokelma Low K+ diet Bactrim effect can be up to 1 week    -- Control Blood sugars   3. Acidosis -  PO bicarbonate  - resolved stop bicarb      Anemia ID - on PO iron   Recent Labs   Lab 04/21/22  0714 04/21/22  1431 04/22/22  5515  04/23/22  0321   HGB 10.9*  --  10.0* 9.1*   HCT 34.3* 32* 30.4* 28.2*   *  --  806* 751*       Iron   Lab Results   Component Value Date    IRON 19 (L) 04/21/2022    TIBC 401 04/21/2022    FERRITIN 84 04/13/2022       4. MBD (E88.9 M90.80) -  Recent Labs   Lab 04/21/22  1333 04/22/22  0450 04/23/22  0321   CALCIUM 10.4   < > 9.7   PHOS 3.0  --   --     < > = values in this interval not displayed.   Hypomagnesemia   Recent Labs   Lab 04/17/22  1050   MG 1.5*   replace Mg     Lab Results   Component Value Date    CALCIUM 9.7 04/23/2022    PHOS 3.0 04/21/2022     Lab Results   Component Value Date    QAGNQRGT98VH 35 02/09/2017       Lab Results   Component Value Date    CO2 28 04/23/2022   Hypercalcemia borderline elevated   -- increase PO water intake  Immobility, monitor for now      5. Nutrition/Hypoalbuminemia (E88.09) -    Recent Labs   Lab 04/19/22  0640 04/20/22  0501 04/22/22  0450 04/23/22  0321   LABPROT 10.3  --   --   --    ALBUMIN  --    < > 3.1* 2.8*    < > = values in this interval not displayed.     Nepro with meals TID. Renal vitamins daily      Thank you for the consult, will follow  With any question please call [phone]  Madhuri Fong MD    [facility]  FARHAD Mai MD, FACHECTOR LOGAN MD,   MD PADDY Fields MD E. V. Harmon, NP    [address]  [place], [address] [phone]

## 2022-04-23 NOTE — PROGRESS NOTES
[facility] Medicine  Progress Note    Patient Name: Audrey Natarajan  MRN: [id number]  Patient Class: IP- Inpatient   Admission Date: 4/12/2022  Length of Stay: 5 days  Attending Physician: Terry Mc,*  Primary Care Provider: Donaldo Pena MD        Subjective:     Principal Problem:Diabetic foot ulcer associated with type 2 diabetes mellitus        HPI:  Audrey Natarajan is a 50 yo female with a pmh of DM2, bipolar 1 disorder, cellulitis, COPD, HTN, CAD, DVT/PE. She presented to the ED with c/o fevers x 1 day. She also has BLE swelling and pain and wounds to both feet, worsening x 2 weeks. She had fever up to 102F yesterday. She reports the wounds to her right foot are from dragging her feet while she was angry. Denies drainage to foot wounds. She has had blood clots in the past and has an IVC filter placed in 2012. She was sent here from [facility] where she was under CEC for psychosis. ED workup revealed BLE DVT on venous US, WBC 31, and Hgb 9.9. She received a dose of vancomycin while in the ED.       Overview/Hospital Course:  Admitted for diabetic foot infection, cellulitis, and DVT. Patient is now in a Judicial Commitment Petition Filed Status (filed on 04/21/2022) due to continued grave disability 2/2 mental illness at this time (will have court paperwork scanned into chart once received from the hospital ). History of DVT/PE, hypercoagulable state, IVC filter in place. Therapeutic lovenox initiated, transitioned to eliquis. Podiatry consulted for diabetic foot ulcer, debrided on 4/13 with cultures obtained - growing MRSA. Started on IV Clindamycin, switched to bactrim with flagyl, with further discontinuation of bactrim and initiation of vancomycin. ID consulted to follow for antibiotic regimen. Psych consulted for medication evaluation, pt with CEC from behavioral health facility. Now a Judicial Commitment Petition Filed Status (filed 4/21) due  to continued grave disability 2/2 mental illness at this time (will have court paperwork scanned into chart once received from the hospital ) facility. Developed ODESSA with increase in Cr to 2.4 as well as hyperkalemia, nephrology consulted for assistance in management. ODESSA and hyperkalemia have since resolved (4/22). Renal US showed minimally dilated central renal collecting system on the left and mildly elevated resistive indices, findings which may be seen in setting of medical renal disease. Heme/onc consulted given iliofemoral lymphadenopathy, possibly reactive or neoplastic identified on CT abd/pelvis on 4/19, likely secondary to panniculitis, no further work up needed.      Interval History:  No acute events overnight.  Security was called today, however patient redirected.  Leukocytosis fluctuating.  Remains on Zosyn and vancomycin    Review of Systems   Constitutional:  Negative for appetite change and fever.   HENT:  Negative for congestion, rhinorrhea and sore throat.    Eyes:  Negative for photophobia and visual disturbance.   Respiratory:  Negative for cough and shortness of breath.    Cardiovascular:  Positive for leg swelling. Negative for chest pain.   Gastrointestinal:  Negative for abdominal pain, constipation, diarrhea, nausea and vomiting.   Genitourinary:  Negative for dysuria and hematuria.   Musculoskeletal:  Positive for arthralgias.   Skin:  Positive for color change and wound.   Neurological:  Negative for speech difficulty, weakness and headaches.   Psychiatric/Behavioral:  Positive for agitation and behavioral problems. Negative for confusion and suicidal ideas. The patient is nervous/anxious.    Objective:     Vital Signs (Most Recent):  Temp: 98.5 °F (36.9 °C) (04/23/22 1210)  Pulse: 84 (04/23/22 1210)  Resp: 18 (04/23/22 1210)  BP: (!) 107/54 (04/23/22 1210)  SpO2: (!) 94 % (04/23/22 1210) Vital Signs (24h Range):  Temp:  [96.3 °F (35.7 °C)-98.5 °F (36.9 °C)] 98.5 °F (36.9  °C)  Pulse:  [] 84  Resp:  [18-19] 18  SpO2:  [91 %-100 %] 94 %  BP: (107-134)/(54-63) 107/54     Weight: 107.8 kg (237 lb 10.5 oz)  Body mass index is 39.55 kg/m².  No intake or output data in the 24 hours ending 04/23/22 1309     Physical Exam  Vitals and nursing note reviewed.   Constitutional:       General: She is not in acute distress.     Appearance: She is obese. She is not toxic-appearing.   HENT:      Head: Normocephalic and atraumatic.      Nose: Nose normal.      Mouth/Throat:      Mouth: Mucous membranes are moist.   Eyes:      Pupils: Pupils are equal, round, and reactive to light.   Cardiovascular:      Rate and Rhythm: Regular rhythm. Tachycardia present.      Pulses: Normal pulses.      Heart sounds: Normal heart sounds.   Pulmonary:      Effort: Pulmonary effort is normal.      Breath sounds: Normal breath sounds.   Abdominal:      General: Bowel sounds are normal.      Palpations: Abdomen is soft.      Tenderness: There is abdominal tenderness (lower abdomen).   Musculoskeletal:         General: Tenderness present. Normal range of motion.      Cervical back: Normal range of motion.      Right lower leg: No edema.      Left lower leg: No edema.   Skin:     General: Skin is warm and dry.      Comments: Both feet bandaged   Neurological:      Mental Status: She is alert and oriented to person, place, and time.   Psychiatric:         Behavior: Behavior normal.         Thought Content: Thought content normal.       Significant Labs: All pertinent labs within the past 24 hours have been reviewed.  BMP:   Recent Labs   Lab 04/23/22  0321   *   *   K 5.2*   CL 95   CO2 28   BUN 26*   CREATININE 0.8   CALCIUM 9.7       CBC:   Recent Labs   Lab 04/21/22  1431 04/22/22  0450 04/23/22  0321   WBC  --  13.21* 15.17*   HGB  --  10.0* 9.1*   HCT 32* 30.4* 28.2*   PLT  --  806* 751*       CMP:   Recent Labs   Lab 04/21/22  1333 04/22/22  0450 04/23/22  0321   * 134* 133*   K 5.7* 4.9 5.2*    CL 96 96 95   CO2 24 27 28   * 231* 252*   BUN 41* 26* 26*   CREATININE 1.0 0.8 0.8   CALCIUM 10.4 10.8* 9.7   PROT  --  7.2 6.5   ALBUMIN 3.0* 3.1* 2.8*   BILITOT  --  0.3 0.2   ALKPHOS  --  69 62   AST  --  11 8*   ALT  --  14 13   ANIONGAP 12 11 10   EGFRNONAA >60 >60 >60         Significant Imaging: I have reviewed all pertinent imaging results/findings within the past 24 hours.      Assessment/Plan:      * Diabetic foot ulcer associated with type 2 diabetes mellitus  B/l foot ulcers  - Podiatry consulted,  - Xray in feet bilaterally, findings consistent with Charcot joint of left foot, no acute abnormality   - MRI ordered edema noted as well as charcot changes of left foot without evidence of osteomyelitis  - LLE ulcer debrided with deep cultures taken.   - Site dressed with xeroform and kerlix. Nursing orders in for daily dressing changes  - She is NWB to LLE due to ulcer and history of charcot foot.  - Will continue to follow  - Cont clindamycin - culture growing Staph aureus--sensitive to clinda, switched   - Cultures grew out MRSA and anerobic bacteria; patient is septic today and rising WBC; switched clinda to bactrim and adding flagyl;       - Leukocytosis improving today  - Continue vancomycin and flagyl  - Follow up on ID and podiatry recs    Lymphadenopathy, inguinal  See Panniculitis    Hyperkalemia  Developed during admission, increased to 5.4 and to 5.7    - Given Lokelma  - Nephrology folowing  - Renal US completed to rule out obstruction    Panniculitis  Lymphadenopathy, inguinal  Identified on CT abdomen pelvis on 4/19, no underlying hematoma or abscess    - Heme/Onc consulted, recommendations as follows  - likely secondary to Panniculitis  - no further workup needed    ODESSA (acute kidney injury)  No history of CKD  Creatinine baseline around 0.6-0.7, developed during admission likely secondary to bactrim use, increased to 2.4 and trended down to 1.0  US kidney showed renal US showed  minimally dilated central renal collecting system on the left and mildly elevated resistive indices, findings which may be seen in setting of medical renal disease    - Currently resolved  - Daily Renal Function Panel  - Check Vanc levels before each dose  - Need strict I&Os  - Hold off on serological work up for now,  get basic pending labs today   - No Indication for RRT at this time, K+ acceptable, No Uremia symptoms.  - Avoid Hypotension.  - Renally dose all meds  - Please avoid nephrotoxins, including NSAIDs, aminoglycosides, IV contrast (unless absolutely necessary), gadolinium, fleets and other phosphorous-based laxatives. Caution with antibiotics    Sepsis due to methicillin resistant Staphylococcus aureus (MRSA)  - see principle problem    Anemia  Denies bleeding, baseline 12-13, 9.9 on admit  Iron panel: Iron 13, TIBC 456, Sat Iron 3, Transferrin and Ferritin WNL    - Iron supplement  - Continue to monitor with daily CBC    Acute deep vein thrombosis (DVT) of both lower extremities  Hx of DVT/PE, hypercoagulable state, IVC filter in place  BLE venous US with thrombosis of bilateral posterior tibial veins  Initially started on therapeutic lovenox    - Continue Eliquis    SHAWN (obstructive sleep apnea)  Does not use CPAP    Psychosis  Bipolar 1 disorder    Per Psychiatry note,   - Patient is now in a Judicial Commitment Petition Filed Status (filed on 04/21/2022) due to continued grave disability 2/2 mental illness at this time (will have court paperwork scanned into chart once received from the hospital ).    - Once medically cleared / stable, seek transfer back to [facility] (or another inpatient psychiatric facility; patient may need Med-Psych placement) for continued mental health treatment / stabilization.  - Continue Risperdal 2 mg PO QAM & 3 mg PO QHS and Depakote ER 500 mg PO QAM and 1250 mg PO QHS for Bipolar I Disorder and insomnia   - Continue Vistaril 50 mg PO TID (change to  scheduled) for anxiety and/or insomnia   - HOLD previously outpt prescribed Vyvanse  - Can use Zyprexa 10 mg PO/IM q8 hours PRN for non-redirectable psychotic / manic agitation   - Get repeat trough VPA level tomorrow (Saturday) AM;  - Continue suicide / violence precautions and continue to monitor the patient with a sitter while on a PEC / CEC / CHANCE filed status.   - plan to return to [facility] of following clinical stabilization    Cellulitis of lower extremity  See principle problem     Thrombocytosis  Elevated platelets and increasing since admission, likely secondary to sepsis  Reports history of chronic thrombocytosis which is reactive and secondary to history of splenectomy    Heme/Onc consulted, recommendations as follows  -hence no further workup needed  -okay to monitor platelet count once every 3 to 4 days    Bipolar 1 disorder  See psychosis    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  A1C 7.0  -Hold metformin  -accuchecks and MDSSI  -diabetic diet  -cont neurontin  Currently at goal for hospitalization    COPD (chronic obstructive pulmonary disease)  Cont advair inhaler  duonebs PRN    Essential hypertension  Chronic, controlled.  Latest blood pressure and vitals reviewed-   Temp:  [96.2 °F (35.7 °C)-99.4 °F (37.4 °C)]   Pulse:  []   Resp:  [18-20]   BP: (128-192)/(61-81)   SpO2:  [95 %-100 %] .   Home meds for hypertension were reviewed and noted below.   Hypertension Medications             furosemide (LASIX) 20 MG tablet TAKE 1 TABLET(20 MG) BY MOUTH EVERY DAY    lisinopriL 10 MG tablet Take 1 tablet (10 mg total) by mouth once daily.    metoprolol tartrate (LOPRESSOR) 50 MG tablet TAKE 1 TABLET(50 MG) BY MOUTH TWICE DAILY        While in the hospital, will manage blood pressure as follows; Adjust home antihypertensive regimen as follows- Hold lisinopril and lasix in the setting of ODESSA, resume lopressor    Will utilize p.r.n. blood pressure medication only if patient's blood  pressure greater than  180/110 and she develops symptoms such as worsening chest pain or shortness of breath.      VTE Risk Mitigation (From admission, onward)         Ordered     apixaban tablet 10 mg  2 times daily         04/18/22 1859     IP VTE HIGH RISK PATIENT  Once         04/13/22 0246     Place sequential compression device  Until discontinued         04/13/22 0246                Discharge Planning   HUMBERTO: 4/18/2022     Code Status: Full Code   Is the patient medically ready for discharge?:     Reason for patient still in hospital (select all that apply): Treatment  Discharge Plan A: Psychiatric hospital   Discharge Delays: (!) Other (IP Psych Placement)              Terry Mc MD  Department of Hospital Medicine   [facility]

## 2022-04-24 LAB
ALBUMIN SERPL BCP-MCNC: 2.8 G/DL (ref 3.5–5.2)
ANION GAP SERPL CALC-SCNC: 10 MMOL/L (ref 8–16)
BACTERIA BLD CULT: NORMAL
BACTERIA BLD CULT: NORMAL
BUN SERPL-MCNC: 19 MG/DL (ref 6–20)
CALCIUM SERPL-MCNC: 9.2 MG/DL (ref 8.7–10.5)
CHLORIDE SERPL-SCNC: 97 MMOL/L (ref 95–110)
CO2 SERPL-SCNC: 27 MMOL/L (ref 23–29)
CREAT SERPL-MCNC: 0.7 MG/DL (ref 0.5–1.4)
EST. GFR  (AFRICAN AMERICAN): >60 ML/MIN/1.73 M^2
EST. GFR  (NON AFRICAN AMERICAN): >60 ML/MIN/1.73 M^2
GLUCOSE SERPL-MCNC: 209 MG/DL (ref 70–110)
PHOSPHATE SERPL-MCNC: 4.5 MG/DL (ref 2.7–4.5)
POCT GLUCOSE: 211 MG/DL (ref 70–110)
POCT GLUCOSE: 218 MG/DL (ref 70–110)
POCT GLUCOSE: 224 MG/DL (ref 70–110)
POTASSIUM SERPL-SCNC: 5.2 MMOL/L (ref 3.5–5.1)
SODIUM SERPL-SCNC: 134 MMOL/L (ref 136–145)
VANCOMYCIN TROUGH SERPL-MCNC: 13 UG/ML (ref 10–22)
VANCOMYCIN TROUGH SERPL-MCNC: 21.1 UG/ML (ref 10–22)

## 2022-04-24 PROCEDURE — 27000207 HC ISOLATION

## 2022-04-24 PROCEDURE — 25000003 PHARM REV CODE 250: Performed by: HOSPITALIST

## 2022-04-24 PROCEDURE — 63600175 PHARM REV CODE 636 W HCPCS: Performed by: HOSPITALIST

## 2022-04-24 PROCEDURE — 94640 AIRWAY INHALATION TREATMENT: CPT

## 2022-04-24 PROCEDURE — 11000001 HC ACUTE MED/SURG PRIVATE ROOM

## 2022-04-24 PROCEDURE — 25000003 PHARM REV CODE 250: Performed by: STUDENT IN AN ORGANIZED HEALTH CARE EDUCATION/TRAINING PROGRAM

## 2022-04-24 PROCEDURE — 36415 COLL VENOUS BLD VENIPUNCTURE: CPT | Performed by: HOSPITALIST

## 2022-04-24 PROCEDURE — 80202 ASSAY OF VANCOMYCIN: CPT | Performed by: HOSPITALIST

## 2022-04-24 PROCEDURE — 25000003 PHARM REV CODE 250: Performed by: PSYCHIATRY & NEUROLOGY

## 2022-04-24 PROCEDURE — 25000003 PHARM REV CODE 250: Performed by: NURSE PRACTITIONER

## 2022-04-24 PROCEDURE — 36415 COLL VENOUS BLD VENIPUNCTURE: CPT | Performed by: INTERNAL MEDICINE

## 2022-04-24 PROCEDURE — 94761 N-INVAS EAR/PLS OXIMETRY MLT: CPT

## 2022-04-24 PROCEDURE — 99900035 HC TECH TIME PER 15 MIN (STAT)

## 2022-04-24 PROCEDURE — 25000003 PHARM REV CODE 250

## 2022-04-24 PROCEDURE — 80069 RENAL FUNCTION PANEL: CPT | Performed by: HOSPITALIST

## 2022-04-24 PROCEDURE — 80202 ASSAY OF VANCOMYCIN: CPT | Mod: 91 | Performed by: INTERNAL MEDICINE

## 2022-04-24 RX ADMIN — MUPIROCIN: 20 OINTMENT TOPICAL at 09:04

## 2022-04-24 RX ADMIN — HYDROCODONE BITARTRATE AND ACETAMINOPHEN 1 TABLET: 10; 325 TABLET ORAL at 09:04

## 2022-04-24 RX ADMIN — DIVALPROEX SODIUM 500 MG: 250 TABLET, DELAYED RELEASE ORAL at 09:04

## 2022-04-24 RX ADMIN — GABAPENTIN 300 MG: 300 CAPSULE ORAL at 09:04

## 2022-04-24 RX ADMIN — INSULIN ASPART 4 UNITS: 100 INJECTION, SOLUTION INTRAVENOUS; SUBCUTANEOUS at 09:04

## 2022-04-24 RX ADMIN — HYDROXYZINE PAMOATE 50 MG: 25 CAPSULE ORAL at 09:04

## 2022-04-24 RX ADMIN — ASPIRIN 81 MG CHEWABLE TABLET 81 MG: 81 TABLET CHEWABLE at 09:04

## 2022-04-24 RX ADMIN — INSULIN DETEMIR 10 UNITS: 100 INJECTION, SOLUTION SUBCUTANEOUS at 09:04

## 2022-04-24 RX ADMIN — ACETAMINOPHEN 650 MG: 325 TABLET ORAL at 03:04

## 2022-04-24 RX ADMIN — METRONIDAZOLE 500 MG: 500 TABLET ORAL at 09:04

## 2022-04-24 RX ADMIN — APIXABAN 10 MG: 5 TABLET, FILM COATED ORAL at 09:04

## 2022-04-24 RX ADMIN — INSULIN ASPART 2 UNITS: 100 INJECTION, SOLUTION INTRAVENOUS; SUBCUTANEOUS at 09:04

## 2022-04-24 RX ADMIN — MICONAZOLE NITRATE 2 % TOPICAL POWDER: at 09:04

## 2022-04-24 RX ADMIN — FLUTICASONE FUROATE AND VILANTEROL TRIFENATATE 1 PUFF: 100; 25 POWDER RESPIRATORY (INHALATION) at 07:04

## 2022-04-24 RX ADMIN — ACETAMINOPHEN 650 MG: 325 TABLET ORAL at 11:04

## 2022-04-24 RX ADMIN — METOPROLOL TARTRATE 50 MG: 50 TABLET, FILM COATED ORAL at 09:04

## 2022-04-24 RX ADMIN — DIVALPROEX SODIUM 1250 MG: 250 TABLET, DELAYED RELEASE ORAL at 09:04

## 2022-04-24 RX ADMIN — VANCOMYCIN HYDROCHLORIDE 1250 MG: 1.25 INJECTION, POWDER, LYOPHILIZED, FOR SOLUTION INTRAVENOUS at 02:04

## 2022-04-24 RX ADMIN — PRAVASTATIN SODIUM 40 MG: 40 TABLET ORAL at 09:04

## 2022-04-24 RX ADMIN — Medication 400 MG: at 09:04

## 2022-04-24 RX ADMIN — METRONIDAZOLE 500 MG: 500 TABLET ORAL at 02:04

## 2022-04-24 RX ADMIN — RISPERIDONE 2 MG: 1 TABLET, ORALLY DISINTEGRATING ORAL at 09:04

## 2022-04-24 RX ADMIN — RISPERIDONE 3 MG: 1 TABLET, ORALLY DISINTEGRATING ORAL at 09:04

## 2022-04-24 RX ADMIN — HYDROXYZINE PAMOATE 50 MG: 25 CAPSULE ORAL at 02:04

## 2022-04-24 RX ADMIN — METRONIDAZOLE 500 MG: 500 TABLET ORAL at 06:04

## 2022-04-24 RX ADMIN — FAMOTIDINE 20 MG: 20 TABLET ORAL at 09:04

## 2022-04-24 NOTE — PLAN OF CARE
Patient removed complete dressing from right foot. Right foot cleaned and dressing reapplied. Patient up in room irritated and yelling. Will continue to monitor.

## 2022-04-24 NOTE — PLAN OF CARE
Patient on RA in no apparent distress, given MDI treatment, no adverse reactions will continue to monitor.

## 2022-04-24 NOTE — PROGRESS NOTES
[facility] Medicine  Progress Note    Patient Name: Audrey Natarajan  MRN: [id number]  Patient Class: IP- Inpatient   Admission Date: 4/12/2022  Length of Stay: 6 days  Attending Physician: Terry Mc,*  Primary Care Provider: Donaldo Pena MD        Subjective:     Principal Problem:Diabetic foot ulcer associated with type 2 diabetes mellitus        HPI:  Audrey Natarajan is a 52 yo female with a pmh of DM2, bipolar 1 disorder, cellulitis, COPD, HTN, CAD, DVT/PE. She presented to the ED with c/o fevers x 1 day. She also has BLE swelling and pain and wounds to both feet, worsening x 2 weeks. She had fever up to 102F yesterday. She reports the wounds to her right foot are from dragging her feet while she was angry. Denies drainage to foot wounds. She has had blood clots in the past and has an IVC filter placed in 2012. She was sent here from [facility] where she was under CEC for psychosis. ED workup revealed BLE DVT on venous US, WBC 31, and Hgb 9.9. She received a dose of vancomycin while in the ED.       Overview/Hospital Course:  Admitted for diabetic foot infection, cellulitis, and DVT. Patient is now in a Judicial Commitment Petition Filed Status (filed on 04/21/2022) due to continued grave disability 2/2 mental illness at this time (will have court paperwork scanned into chart once received from the hospital ). History of DVT/PE, hypercoagulable state, IVC filter in place. Therapeutic lovenox initiated, transitioned to eliquis. Podiatry consulted for diabetic foot ulcer, debrided on 4/13 with cultures obtained - growing MRSA. Started on IV Clindamycin, switched to bactrim with flagyl, with further discontinuation of bactrim and initiation of vancomycin. ID consulted to follow for antibiotic regimen. Psych consulted for medication evaluation, pt with CEC from behavioral health facility. Now a Judicial Commitment Petition Filed Status (filed 4/21) due  to continued grave disability 2/2 mental illness at this time (will have court paperwork scanned into chart once received from the hospital ) facility. Developed ODESSA with increase in Cr to 2.4 as well as hyperkalemia, nephrology consulted for assistance in management. ODESSA and hyperkalemia have since resolved (4/22). Renal US showed minimally dilated central renal collecting system on the left and mildly elevated resistive indices, findings which may be seen in setting of medical renal disease. Heme/onc consulted given iliofemoral lymphadenopathy, possibly reactive or neoplastic identified on CT abd/pelvis on 4/19, likely secondary to panniculitis, no further work up needed.      Interval History:  No acute events overnight.  White blood cell count up again today.  Remains on Zosyn and vancomycin    Review of Systems   Constitutional:  Negative for appetite change and fever.   HENT:  Negative for congestion, rhinorrhea and sore throat.    Eyes:  Negative for photophobia and visual disturbance.   Respiratory:  Negative for cough and shortness of breath.    Cardiovascular:  Positive for leg swelling. Negative for chest pain.   Gastrointestinal:  Negative for abdominal pain, constipation, diarrhea, nausea and vomiting.   Genitourinary:  Negative for dysuria and hematuria.   Musculoskeletal:  Positive for arthralgias.   Skin:  Positive for color change and wound.   Neurological:  Negative for speech difficulty, weakness and headaches.   Psychiatric/Behavioral:  Positive for agitation and behavioral problems. Negative for confusion and suicidal ideas. The patient is nervous/anxious.    Objective:     Vital Signs (Most Recent):  Temp: 98 °F (36.7 °C) (04/24/22 0758)  Pulse: 100 (04/24/22 0758)  Resp: 18 (04/24/22 0758)  BP: (!) 146/65 (04/24/22 0758)  SpO2: 99 % (04/24/22 0758) Vital Signs (24h Range):  Temp:  [96.5 °F (35.8 °C)-99.3 °F (37.4 °C)] 98 °F (36.7 °C)  Pulse:  [] 100  Resp:  [18-20] 18  SpO2:  [93  %-100 %] 99 %  BP: (104-146)/(51-65) 146/65     Weight: 107.8 kg (237 lb 10.5 oz)  Body mass index is 39.55 kg/m².    Intake/Output Summary (Last 24 hours) at 4/24/2022 1029  Last data filed at 4/24/2022 1000  Gross per 24 hour   Intake 1380 ml   Output 1200 ml   Net 180 ml        Physical Exam  Vitals and nursing note reviewed.   Constitutional:       General: She is not in acute distress.     Appearance: She is obese. She is not toxic-appearing.   HENT:      Head: Normocephalic and atraumatic.      Nose: Nose normal.      Mouth/Throat:      Mouth: Mucous membranes are moist.   Eyes:      Pupils: Pupils are equal, round, and reactive to light.   Cardiovascular:      Rate and Rhythm: Regular rhythm. Tachycardia present.      Pulses: Normal pulses.      Heart sounds: Normal heart sounds.   Pulmonary:      Effort: Pulmonary effort is normal.      Breath sounds: Normal breath sounds.   Abdominal:      General: Bowel sounds are normal.      Palpations: Abdomen is soft.      Tenderness: There is abdominal tenderness (lower abdomen).   Musculoskeletal:         General: Tenderness present. Normal range of motion.      Cervical back: Normal range of motion.      Right lower leg: No edema.      Left lower leg: No edema.   Skin:     General: Skin is warm and dry.      Comments: Both feet bandaged   Neurological:      Mental Status: She is alert and oriented to person, place, and time.   Psychiatric:         Behavior: Behavior normal.         Thought Content: Thought content normal.       Significant Labs: All pertinent labs within the past 24 hours have been reviewed.  BMP:   Recent Labs   Lab 04/23/22 0321   *   *   K 5.2*   CL 95   CO2 28   BUN 26*   CREATININE 0.8   CALCIUM 9.7       CBC:   Recent Labs   Lab 04/23/22 0321   WBC 15.17*   HGB 9.1*   HCT 28.2*   *       CMP:   Recent Labs   Lab 04/23/22 0321   *   K 5.2*   CL 95   CO2 28   *   BUN 26*   CREATININE 0.8   CALCIUM 9.7   PROT  6.5   ALBUMIN 2.8*   BILITOT 0.2   ALKPHOS 62   AST 8*   ALT 13   ANIONGAP 10   EGFRNONAA >60         Significant Imaging: I have reviewed all pertinent imaging results/findings within the past 24 hours.      Assessment/Plan:      * Diabetic foot ulcer associated with type 2 diabetes mellitus  B/l foot ulcers  - Podiatry consulted,  - Xray in feet bilaterally, findings consistent with Charcot joint of left foot, no acute abnormality   - MRI ordered edema noted as well as charcot changes of left foot without evidence of osteomyelitis  - LLE ulcer debrided with deep cultures taken.   - Site dressed with xeroform and kerlix. Nursing orders in for daily dressing changes  - She is NWB to LLE due to ulcer and history of charcot foot.  - Will continue to follow  - Cont clindamycin - culture growing Staph aureus--sensitive to clinda, switched   - Cultures grew out MRSA and anerobic bacteria; patient is septic today and rising WBC; switched clinda to bactrim and adding flagyl;       - Leukocytosis improving today  - Continue vancomycin and flagyl  - Follow up on ID and podiatry recs    Lymphadenopathy, inguinal  See Panniculitis    Hyperkalemia  Developed during admission, increased to 5.4 and to 5.7    - Given Lokelma  - Nephrology folowing  - Renal US completed to rule out obstruction    Panniculitis  Lymphadenopathy, inguinal  Identified on CT abdomen pelvis on 4/19, no underlying hematoma or abscess    - Heme/Onc consulted, recommendations as follows  - likely secondary to Panniculitis  - no further workup needed    ODESSA (acute kidney injury)  No history of CKD  Creatinine baseline around 0.6-0.7, developed during admission likely secondary to bactrim use, increased to 2.4 and trended down to 1.0  US kidney showed renal US showed minimally dilated central renal collecting system on the left and mildly elevated resistive indices, findings which may be seen in setting of medical renal disease    - Currently resolved  -  Daily Renal Function Panel  - Check Vanc levels before each dose  - Need strict I&Os  - Hold off on serological work up for now,  get basic pending labs today   - No Indication for RRT at this time, K+ acceptable, No Uremia symptoms.  - Avoid Hypotension.  - Renally dose all meds  - Please avoid nephrotoxins, including NSAIDs, aminoglycosides, IV contrast (unless absolutely necessary), gadolinium, fleets and other phosphorous-based laxatives. Caution with antibiotics    Sepsis due to methicillin resistant Staphylococcus aureus (MRSA)  - see principle problem    Anemia  Denies bleeding, baseline 12-13, 9.9 on admit  Iron panel: Iron 13, TIBC 456, Sat Iron 3, Transferrin and Ferritin WNL    - Iron supplement  - Continue to monitor with daily CBC    Acute deep vein thrombosis (DVT) of both lower extremities  Hx of DVT/PE, hypercoagulable state, IVC filter in place  BLE venous US with thrombosis of bilateral posterior tibial veins  Initially started on therapeutic lovenox    - Continue Eliquis    SHAWN (obstructive sleep apnea)  Does not use CPAP    Psychosis  Bipolar 1 disorder    Per Psychiatry note,   - Patient is now in a Judicial Commitment Petition Filed Status (filed on 04/21/2022) due to continued grave disability 2/2 mental illness at this time (will have court paperwork scanned into chart once received from the hospital ).    - Once medically cleared / stable, seek transfer back to [facility] (or another inpatient psychiatric facility; patient may need Med-Psych placement) for continued mental health treatment / stabilization.  - Continue Risperdal 2 mg PO QAM & 3 mg PO QHS and Depakote ER 500 mg PO QAM and 1250 mg PO QHS for Bipolar I Disorder and insomnia   - Continue Vistaril 50 mg PO TID (change to scheduled) for anxiety and/or insomnia   - HOLD previously outpt prescribed Vyvanse  - Can use Zyprexa 10 mg PO/IM q8 hours PRN for non-redirectable psychotic / manic agitation   - Get repeat trough VPA  level tomorrow (Saturday) AM;  - Continue suicide / violence precautions and continue to monitor the patient with a sitter while on a PEC / CEC / CHANCE filed status.   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  Latest blood pressure and vitals reviewed-   Temp:  [96.2 °F (35.7 °C)-99.4 °F (37.4 °C)]   Pulse:  []   Resp:  [18-20]   BP: (128-192)/(61-81)   SpO2:  [95 %-100 %] .   Home meds for hypertension were reviewed and noted below.   Hypertension Medications             furosemide (LASIX) 20 MG tablet TAKE 1 TABLET(20 MG) BY MOUTH EVERY DAY    lisinopriL 10 MG tablet Take 1 tablet (10 mg total) by mouth once daily.    metoprolol tartrate (LOPRESSOR) 50 MG tablet TAKE 1 TABLET(50 MG) BY MOUTH TWICE DAILY        While in the hospital, will manage blood pressure as follows; Adjust home antihypertensive regimen as follows- Hold lisinopril and lasix in the setting of ODESSA, resume lopressor    Will utilize p.r.n. blood pressure medication only if patient's blood pressure greater than  180/110 and she develops symptoms such as worsening chest pain or shortness of breath.      VTE Risk Mitigation (From admission, onward)         Ordered      apixaban tablet 10 mg  2 times daily         04/18/22 1859     IP VTE HIGH RISK PATIENT  Once         04/13/22 0246     Place sequential compression device  Until discontinued         04/13/22 0246                Discharge Planning   HUMBERTO: 4/18/2022     Code Status: Full Code   Is the patient medically ready for discharge?:     Reason for patient still in hospital (select all that apply): Patient trending condition  Discharge Plan A: Psychiatric hospital   Discharge Delays: (!) Other (IP Psych Placement)              Terry Mc MD  Department of [facility] Medicine   [facility]

## 2022-04-24 NOTE — PROGRESS NOTES
Pharmacokinetic Assessment Follow Up: IV Vancomycin    Vancomycin serum concentration assessment(s):    The trough level was drawn correctly and can be used to guide therapy at this time. The measurement is within the desired definitive target range of 10 to 15 mcg/mL.    Vancomycin Regimen Plan:    Continue regimen to Vancomycin 1250 mg IV every 12 hours with next serum trough concentration measured at 1300 prior to 4th dose on 4-26-22    Drug levels (last 3 results):  Recent Labs   Lab Result Units 04/22/22  1506 04/24/22  0225 04/24/22  1316   Vancomycin-Trough ug/mL 10.7 21.1 13.0       Pharmacy will continue to follow and monitor vancomycin.    Please contact pharmacy at extension [phone] for questions regarding this assessment.    Thank you for the consult,   Tone Trent       Patient brief summary:  Audrey Natarajan is a 51 y.o. female initiated on antimicrobial therapy with IV Vancomycin for treatment of skin & soft tissue infection      Drug Allergies:   Review of patient's allergies indicates:   Allergen Reactions    Morphine Other (See Comments)     Patient had a psychotic episode after taking Morphine  Agitation, hallucinations    Penicillins Anaphylaxis     itching    Januvia [sitagliptin] Hives       Actual Body Weight:   107.8 kg    Renal Function:   Estimated Creatinine Clearance: 118.6 mL/min (based on SCr of 0.7 mg/dL).,     Dialysis Method (if applicable):  N/A    CBC (last 72 hours):  Recent Labs   Lab Result Units 04/22/22  0450 04/23/22  0321   WBC K/uL 13.21* 15.17*   Hemoglobin g/dL 10.0* 9.1*   Hematocrit % 30.4* 28.2*   Platelets K/uL 806* 751*   Gran % % 60.1 51.6   Lymph % % 22.2 30.5   Mono % % 12.7 13.1   Eosinophil % % 2.3 2.8   Basophil % % 0.5 0.5   Differential Method  Automated Automated       Metabolic Panel (last 72 hours):  Recent Labs   Lab Result Units 04/22/22  0450 04/23/22  0321 04/23/22  2144 04/24/22  0225   Sodium mmol/L 134* 133*  --  134*   Potassium mmol/L 4.9  5.2*  --  5.2*   Chloride mmol/L 96 95  --  97   CO2 mmol/L 27 28  --  27   Glucose mg/dL 231* 252*  --  209*   Glucose, UA   --   --  2+*  --    BUN mg/dL 26* 26*  --  19   Creatinine mg/dL 0.8 0.8  --  0.7   Albumin g/dL 3.1* 2.8*  --  2.8*   Total Bilirubin mg/dL 0.3 0.2  --   --    Alkaline Phosphatase U/L 69 62  --   --    AST U/L 11 8*  --   --    ALT U/L 14 13  --   --    Phosphorus mg/dL  --   --   --  4.5       Vancomycin Administrations:  vancomycin given in the last 96 hours                     vancomycin 1.25 g in dextrose 5% 250 mL IVPB (ready to mix) (mg) 1,250 mg New Bag 04/24/22 0210     1,250 mg New Bag 04/23/22 1459     1,250 mg New Bag  0332     1,250 mg New Bag 04/22/22 1513     1,250 mg New Bag  0210      Restarted 04/21/22 1535      Restarted  1503     1,250 mg New Bag  1500                    Microbiologic Results:  Microbiology Results (last 7 days)       Procedure Component Value Units Date/Time    Blood culture [[id number]] Collected: 04/18/22 1946    Order Status: Completed Specimen: Blood from Antecubital, Right Arm Updated: 04/24/22 0612     Blood Culture, Routine No growth after 5 days.    Blood culture [[id number]] Collected: 04/18/22 1946    Order Status: Completed Specimen: Blood from Antecubital, Left Arm Updated: 04/24/22 0612     Blood Culture, Routine No growth after 5 days.    Urine culture [[id number]] Collected: 04/23/22 2144    Order Status: No result Specimen: Urine Updated: 04/23/22 2228    Culture, Anaerobe [[id number]]  (Abnormal) Collected: 04/13/22 1234    Order Status: Completed Specimen: Wound from Foot, Left Updated: 04/18/22 1226     Anaerobic Culture FINEGOLDIA MAGNA  Moderate      Blood culture [[id number]] Collected: 04/13/22 0309    Order Status: Completed Specimen: Blood from Peripheral, Antecubital, Left Updated: 04/18/22 1212     Blood Culture, Routine No growth after 5 days.    Blood culture [[id number]] Collected: 04/13/22 0309    Order Status: Completed  Specimen: Blood from Peripheral, Antecubital, Left Updated: 04/18/22 1212     Blood Culture, Routine No growth after 5 days.

## 2022-04-24 NOTE — ASSESSMENT & PLAN NOTE
Bipolar 1 disorder    Per Psychiatry note,   - Patient is now in a Judicial Commitment Petition Filed Status (filed on 04/21/2022) due to continued grave disability 2/2 mental illness at this time (will have court paperwork scanned into chart once received from the hospital ).    - Once medically cleared / stable, seek transfer back to [facility] (or another inpatient psychiatric facility; patient may need Med-Psych placement) for continued mental health treatment / stabilization.  - Continue Risperdal 2 mg PO QAM & 3 mg PO QHS and Depakote ER 500 mg PO QAM and 1250 mg PO QHS for Bipolar I Disorder and insomnia   - Continue Vistaril 50 mg PO TID (change to scheduled) for anxiety and/or insomnia   - HOLD previously outpt prescribed Vyvanse  - Can use Zyprexa 10 mg PO/IM q8 hours PRN for non-redirectable psychotic / manic agitation   - Get repeat trough VPA level tomorrow (Saturday) AM;  - Continue suicide / violence precautions and continue to monitor the patient with a sitter while on a PEC / CEC / CHANCE filed status.   - plan to return to [facility] of following clinical stabilization, likely early this week

## 2022-04-24 NOTE — SUBJECTIVE & OBJECTIVE
Interval History:  No acute events overnight.  White blood cell count up again today.  Remains on Zosyn and vancomycin    Review of Systems   Constitutional:  Negative for appetite change and fever.   HENT:  Negative for congestion, rhinorrhea and sore throat.    Eyes:  Negative for photophobia and visual disturbance.   Respiratory:  Negative for cough and shortness of breath.    Cardiovascular:  Positive for leg swelling. Negative for chest pain.   Gastrointestinal:  Negative for abdominal pain, constipation, diarrhea, nausea and vomiting.   Genitourinary:  Negative for dysuria and hematuria.   Musculoskeletal:  Positive for arthralgias.   Skin:  Positive for color change and wound.   Neurological:  Negative for speech difficulty, weakness and headaches.   Psychiatric/Behavioral:  Positive for agitation and behavioral problems. Negative for confusion and suicidal ideas. The patient is nervous/anxious.    Objective:     Vital Signs (Most Recent):  Temp: 98 °F (36.7 °C) (04/24/22 0758)  Pulse: 100 (04/24/22 0758)  Resp: 18 (04/24/22 0758)  BP: (!) 146/65 (04/24/22 0758)  SpO2: 99 % (04/24/22 0758) Vital Signs (24h Range):  Temp:  [96.5 °F (35.8 °C)-99.3 °F (37.4 °C)] 98 °F (36.7 °C)  Pulse:  [] 100  Resp:  [18-20] 18  SpO2:  [93 %-100 %] 99 %  BP: (104-146)/(51-65) 146/65     Weight: 107.8 kg (237 lb 10.5 oz)  Body mass index is 39.55 kg/m².    Intake/Output Summary (Last 24 hours) at 4/24/2022 1029  Last data filed at 4/24/2022 1000  Gross per 24 hour   Intake 1380 ml   Output 1200 ml   Net 180 ml        Physical Exam  Vitals and nursing note reviewed.   Constitutional:       General: She is not in acute distress.     Appearance: She is obese. She is not toxic-appearing.   HENT:      Head: Normocephalic and atraumatic.      Nose: Nose normal.      Mouth/Throat:      Mouth: Mucous membranes are moist.   Eyes:      Pupils: Pupils are equal, round, and reactive to light.   Cardiovascular:      Rate and Rhythm:  Regular rhythm. Tachycardia present.      Pulses: Normal pulses.      Heart sounds: Normal heart sounds.   Pulmonary:      Effort: Pulmonary effort is normal.      Breath sounds: Normal breath sounds.   Abdominal:      General: Bowel sounds are normal.      Palpations: Abdomen is soft.      Tenderness: There is abdominal tenderness (lower abdomen).   Musculoskeletal:         General: Tenderness present. Normal range of motion.      Cervical back: Normal range of motion.      Right lower leg: No edema.      Left lower leg: No edema.   Skin:     General: Skin is warm and dry.      Comments: Both feet bandaged   Neurological:      Mental Status: She is alert and oriented to person, place, and time.   Psychiatric:         Behavior: Behavior normal.         Thought Content: Thought content normal.       Significant Labs: All pertinent labs within the past 24 hours have been reviewed.  BMP:   Recent Labs   Lab 04/23/22  0321   *   *   K 5.2*   CL 95   CO2 28   BUN 26*   CREATININE 0.8   CALCIUM 9.7       CBC:   Recent Labs   Lab 04/23/22 0321   WBC 15.17*   HGB 9.1*   HCT 28.2*   *       CMP:   Recent Labs   Lab 04/23/22  0321   *   K 5.2*   CL 95   CO2 28   *   BUN 26*   CREATININE 0.8   CALCIUM 9.7   PROT 6.5   ALBUMIN 2.8*   BILITOT 0.2   ALKPHOS 62   AST 8*   ALT 13   ANIONGAP 10   EGFRNONAA >60         Significant Imaging: I have reviewed all pertinent imaging results/findings within the past 24 hours.

## 2022-04-24 NOTE — PLAN OF CARE
Plan of care reviewed with the patient. Scheduled medicines given and the patient tolerated well. Given Tylenol 650 mg for Backache 6/10. Fall and safety interventions are in place. Acu check was 270 mg/dl, covered with 3 Units Insulin Aspart. No acute distress reported in the shift. Patient is on CEC and the sitter is at the bedside. Advised the patient to call for the assistance.Continued monitoring the patient.

## 2022-04-24 NOTE — PROGRESS NOTES
Pharmacokinetic Assessment Follow Up: IV Vancomycin    Vancomycin serum concentration assessment(s):    The trough level was drawn incorrectly and cannot be used to guide therapy at this time.    Vancomycin Regimen Plan:    Continue regimen to Vancomycin 1250 mg IV every 12 hours with next serum trough concentration measured at 1300 prior to next dose on 4/24    Drug levels (last 3 results):  Recent Labs   Lab Result Units 04/21/22  1333 04/22/22  1506 04/24/22  0225   Vancomycin-Trough ug/mL 5.3* 10.7 21.1       Pharmacy will continue to follow and monitor vancomycin.    Please contact pharmacy at extension [phone] for questions regarding this assessment.    Thank you for the consult,   Ashley Damon       Patient brief summary:  Audrey Natarajan is a 51 y.o. female initiated on antimicrobial therapy with IV Vancomycin for treatment of  Diabetic foot infection    The patient's current regimen is 1250 mg q12h    Drug Allergies:   Review of patient's allergies indicates:   Allergen Reactions    Morphine Other (See Comments)     Patient had a psychotic episode after taking Morphine  Agitation, hallucinations    Penicillins Anaphylaxis     itching    Januvia [sitagliptin] Hives       Actual Body Weight:   107.8 kg    Renal Function:   Estimated Creatinine Clearance: 103.8 mL/min (based on SCr of 0.8 mg/dL).,     Dialysis Method (if applicable):  N/A    CBC (last 72 hours):  Recent Labs   Lab Result Units 04/21/22  0714 04/22/22  0450 04/23/22  0321   WBC K/uL 15.65* 13.21* 15.17*   Hemoglobin g/dL 10.9* 10.0* 9.1*   Hematocrit % 34.3* 30.4* 28.2*   Platelets K/uL 807* 806* 751*   Gran % % 64.0 60.1 51.6   Lymph % % 19.3 22.2 30.5   Mono % % 11.1 12.7 13.1   Eosinophil % % 1.9 2.3 2.8   Basophil % % 0.6 0.5 0.5   Differential Method  Automated Automated Automated       Metabolic Panel (last 72 hours):  Recent Labs   Lab Result Units 04/21/22  0714 04/21/22  1333 04/22/22  0450 04/23/22  0321 04/23/22  2144    Sodium mmol/L 130* 132* 134* 133*  --    Potassium mmol/L 6.7* 5.7* 4.9 5.2*  --    Chloride mmol/L 95 96 96 95  --    CO2 mmol/L 19* 24 27 28  --    Glucose mg/dL 208* 258* 231* 252*  --    Glucose, UA   --   --   --   --  2+*   BUN mg/dL 48* 41* 26* 26*  --    Creatinine mg/dL 1.3 1.0 0.8 0.8  --    Albumin g/dL 3.6 3.0* 3.1* 2.8*  --    Total Bilirubin mg/dL  --   --  0.3 0.2  --    Alkaline Phosphatase U/L  --   --  69 62  --    AST U/L  --   --  11 8*  --    ALT U/L  --   --  14 13  --    Phosphorus mg/dL 3.9 3.0  --   --   --        Vancomycin Administrations:  vancomycin given in the last 96 hours                     vancomycin 1.25 g in dextrose 5% 250 mL IVPB (ready to mix) (mg) 1,250 mg New Bag 04/24/22 0210     1,250 mg New Bag 04/23/22 1459     1,250 mg New Bag  0332     1,250 mg New Bag 04/22/22 1513     1,250 mg New Bag  0210      Restarted 04/21/22 1535      Restarted  1503     1,250 mg New Bag  1500    vancomycin (VANCOCIN) 2,000 mg in dextrose 5 % 500 mL IVPB (mg) 2,000 mg New Bag 04/20/22 1436                    Microbiologic Results:  Microbiology Results (last 7 days)       Procedure Component Value Units Date/Time    Urine culture [[id number]] Collected: 04/23/22 2144    Order Status: No result Specimen: Urine Updated: 04/23/22 2228    Blood culture [[id number]] Collected: 04/18/22 1946    Order Status: Completed Specimen: Blood from Antecubital, Right Arm Updated: 04/23/22 0612     Blood Culture, Routine No Growth to date      No Growth to date      No Growth to date      No Growth to date      No Growth to date    Blood culture [[id number]] Collected: 04/18/22 1946    Order Status: Completed Specimen: Blood from Antecubital, Left Arm Updated: 04/23/22 0612     Blood Culture, Routine No Growth to date      No Growth to date      No Growth to date      No Growth to date      No Growth to date    Culture, Anaerobe [[id number]]  (Abnormal) Collected: 04/13/22 1234    Order Status: Completed  Specimen: Wound from Foot, Left Updated: 04/18/22 1226     Anaerobic Culture FINEGOLDIA MAGNA  Moderate      Blood culture [[id number]] Collected: 04/13/22 0309    Order Status: Completed Specimen: Blood from Peripheral, Antecubital, Left Updated: 04/18/22 1212     Blood Culture, Routine No growth after 5 days.    Blood culture [[id number]] Collected: 04/13/22 0309    Order Status: Completed Specimen: Blood from Peripheral, Antecubital, Left Updated: 04/18/22 1212     Blood Culture, Routine No growth after 5 days.

## 2022-04-24 NOTE — PROGRESS NOTES
Nephrology Progress Note       Consult Requested By: Terry Mc,*  Reason for Consult: ODESSA     SUBJECTIVE:      ?    Review of Systems   Constitutional: Negative for chills and fever.   HENT: Negative for congestion and sore throat.    Eyes: Negative for blurred vision, double vision and photophobia.   Respiratory: Negative for cough and shortness of breath.    Cardiovascular: Negative for chest pain, palpitations and leg swelling.   Gastrointestinal: Negative for abdominal pain, diarrhea, nausea and vomiting.   Genitourinary: Negative for dysuria and urgency.   Musculoskeletal: Negative for joint pain and myalgias.   Skin: Negative for itching and rash.   Neurological: Negative for dizziness, sensory change, weakness and headaches.   Endo/Heme/Allergies: Negative for polydipsia. Does not bruise/bleed easily.   Psychiatric/Behavioral: Positive for depression. The patient is nervous/anxious.        Past Medical History:   Diagnosis Date    ADHD (attention deficit hyperactivity disorder)     Arthritis     Asthma     Bipolar 1 disorder     Cataract     COPD (chronic obstructive pulmonary disease)     Coronary artery disease     A fib    Depression     bipolar manic depresson    Diabetes mellitus     DVT of lower extremity, bilateral July 2013    bilateral LE DVT. [place] filter placed.     Encounter for blood transfusion     History of blood clots 1. Left Leg=2003; 2.Bilateral Groin=Blood Clots= 5 or 6/ 2013 & 7/2013; 3. LLL of Lung=7/2013;  4. Lt. Lower Leg=7/2013.     Pt. had 1st Blood Clot after Vuiohhqsosbs=1482, & Last=2013. [place] Filter= Rt.Lateral Neck.    HTN (hypertension) 6/6/2013    Pt states that she does not have hypertension    Hypercholesteremia     Irregular heartbeat     Neuromuscular disorder     neuropathy feet    PE (pulmonary embolism) July 2013     bilat LE DVT.     Restless leg syndrome        OBJECTIVE:     Vital Signs (Most Recent)  Vitals:    04/24/22  0352 04/24/22 0357 04/24/22 0747 04/24/22 0758   BP:  (!) 125/58  (!) 146/65   BP Location:       Patient Position:  Sitting  Sitting   Pulse:  91 (!) 112 100   Resp:  18 18 18   Temp:  99.3 °F (37.4 °C)  98 °F (36.7 °C)   TempSrc:  Oral  Oral   SpO2: (!) 93% (!) 93% 97% 99%   Weight:       Height:             Date 04/24/22 0700 - 04/25/22 0659   Shift 6372-9735 0031-3729 4080-6630 24 Hour Total   INTAKE   P.O. 680   680   Shift Total(mL/kg) 680(6.3)   680(6.3)   OUTPUT   Urine(mL/kg/hr) 400   400   Shift Total(mL/kg) 400(3.7)   400(3.7)   Weight (kg) 107.8 107.8 107.8 107.8           Medications:   apixaban  10 mg Oral BID    aspirin  81 mg Oral Daily    divalproex  1,250 mg Oral QHS    divalproex  500 mg Oral Daily    famotidine  20 mg Oral Daily    ferrous sulfate  1 tablet Oral Daily    fluticasone furoate-vilanteroL  1 puff Inhalation Daily    gabapentin  300 mg Oral BID    hydrOXYzine pamoate  50 mg Oral TID    insulin detemir U-100  10 Units Subcutaneous QHS    magnesium oxide  400 mg Oral BID    metoprolol tartrate  50 mg Oral BID    metroNIDAZOLE  500 mg Oral Q8H    miconazole NITRATE 2 %   Topical (Top) BID    mupirocin   Nasal BID    nicotine  1 patch Transdermal Daily    pravastatin  40 mg Oral QHS    risperiDONE  2 mg Oral Daily    risperiDONE  3 mg Oral Nightly    vancomycin (VANCOCIN) IVPB  1,250 mg Intravenous Q12H           Physical Exam  Vitals and nursing note reviewed.   Constitutional:       General: She is not in acute distress.     Appearance: She is obese. She is not diaphoretic.   HENT:      Head: Normocephalic and atraumatic.      Mouth/Throat:      Pharynx: No oropharyngeal exudate.   Eyes:      General: No scleral icterus.     Conjunctiva/sclera: Conjunctivae normal.      Pupils: Pupils are equal, round, and reactive to light.   Cardiovascular:      Rate and Rhythm: Normal rate and regular rhythm.      Heart sounds: Normal heart sounds. No murmur heard.  Pulmonary:       Effort: Pulmonary effort is normal. No respiratory distress.      Breath sounds: Normal breath sounds.   Abdominal:      General: Bowel sounds are normal. There is no distension.      Palpations: Abdomen is soft.      Tenderness: There is no abdominal tenderness.   Musculoskeletal:         General: Normal range of motion.      Cervical back: Normal range of motion and neck supple.      Comments: Foot wound    Skin:     General: Skin is warm and dry.      Findings: No erythema.   Neurological:      Mental Status: She is alert and oriented to person, place, and time.      Cranial Nerves: No cranial nerve deficit.   Psychiatric:         Mood and Affect: Affect normal.         Cognition and Memory: Memory normal.         Judgment: Judgment normal.         Laboratory:  Recent Labs   Lab 04/21/22  0714 04/21/22  1431 04/22/22  0450 04/23/22  0321   WBC 15.65*  --  13.21* 15.17*   HGB 10.9*  --  10.0* 9.1*   HCT 34.3* 32* 30.4* 28.2*   *  --  806* 751*   MONO 11.1  1.7*  --  12.7  1.7* 13.1  2.0*     Recent Labs   Lab 04/21/22  0714 04/21/22  1333 04/22/22  0450 04/23/22  0321   * 132* 134* 133*   K 6.7* 5.7* 4.9 5.2*   CL 95 96 96 95   CO2 19* 24 27 28   BUN 48* 41* 26* 26*   CREATININE 1.3 1.0 0.8 0.8   CALCIUM 11.4* 10.4 10.8* 9.7   PHOS 3.9 3.0  --   --        Diagnostic Results:  X-Ray: Reviewed  US: Reviewed  Echo: Reviewed  ASSESSMENT/PLAN:     1. ODESSA - Cr 0.9 to 2.4 most likely  Due to Bactrim  - resolved   --   US kidney no obstruction      --   Vanc levels before each dose   Treat - infection, need strict I&O   -- Daily Renal Function Panel  -- Avoid Hypotension.  -- Renally dose all meds  -- Please avoid nephrotoxins, including NSAIDs, aminoglycosides, IV contrast (unless absolutely necessary), gadolinium, fleets and other phosphorous-based laxatives. Caution with antibiotics.    Nephrology sign off     2. Hyperkalemia  -  Resolved stop  lokelma Low K+ diet,  Bactrim effect can be up to 1 week     -- Control Blood sugars   3. Acidosis -  PO bicarbonate  - resolved stop bicarb      Anemia ID - on PO iron   Recent Labs   Lab 04/21/22  0714 04/21/22  1431 04/22/22  0450 04/23/22  0321   HGB 10.9*  --  10.0* 9.1*   HCT 34.3* 32* 30.4* 28.2*   *  --  806* 751*       Iron   Lab Results   Component Value Date    IRON 19 (L) 04/21/2022    TIBC 401 04/21/2022    FERRITIN 84 04/13/2022       4. MBD (E88.9 M90.80) -  Recent Labs   Lab 04/21/22  1333 04/22/22  0450 04/23/22  0321   CALCIUM 10.4   < > 9.7   PHOS 3.0  --   --     < > = values in this interval not displayed.   Hypomagnesemia   No results for input(s): MG in the last 168 hours.replace Mg     Lab Results   Component Value Date    CALCIUM 9.7 04/23/2022    PHOS 3.0 04/21/2022     Lab Results   Component Value Date    LCQLWVKL09WL 35 02/09/2017       Lab Results   Component Value Date    CO2 28 04/23/2022   Hypercalcemia borderline elevated   -- increase PO water intake  Immobility, monitor for now      5. Nutrition/Hypoalbuminemia (E88.09) -    Recent Labs   Lab 04/19/22  0640 04/20/22  0501 04/22/22  0450 04/23/22  0321   LABPROT 10.3  --   --   --    ALBUMIN  --    < > 3.1* 2.8*    < > = values in this interval not displayed.     Nepro with meals TID. Renal vitamins daily      Thank you for the consult, will follow  With any question please call [phone]  Madhuri Fong MD    [facility]  FARHAD Mai MD, FACP,   HECTOR Baer MD,   MD PADDY Fields MD E. V. Harmon, NP    [address]  [place], [id number] [phone]

## 2022-04-25 LAB
ALBUMIN SERPL BCP-MCNC: 2.8 G/DL (ref 3.5–5.2)
ANION GAP SERPL CALC-SCNC: 9 MMOL/L (ref 8–16)
ANISOCYTOSIS BLD QL SMEAR: SLIGHT
BASOPHILS # BLD AUTO: 0.11 K/UL (ref 0–0.2)
BASOPHILS NFR BLD: 0.7 % (ref 0–1.9)
BUN SERPL-MCNC: 15 MG/DL (ref 6–20)
BURR CELLS BLD QL SMEAR: ABNORMAL
CALCIUM SERPL-MCNC: 9.4 MG/DL (ref 8.7–10.5)
CHLORIDE SERPL-SCNC: 93 MMOL/L (ref 95–110)
CO2 SERPL-SCNC: 28 MMOL/L (ref 23–29)
CREAT SERPL-MCNC: 0.7 MG/DL (ref 0.5–1.4)
DIFFERENTIAL METHOD: ABNORMAL
EOSINOPHIL # BLD AUTO: 0.4 K/UL (ref 0–0.5)
EOSINOPHIL NFR BLD: 2.3 % (ref 0–8)
ERYTHROCYTE [DISTWIDTH] IN BLOOD BY AUTOMATED COUNT: 15.5 % (ref 11.5–14.5)
EST. GFR  (AFRICAN AMERICAN): >60 ML/MIN/1.73 M^2
EST. GFR  (NON AFRICAN AMERICAN): >60 ML/MIN/1.73 M^2
GLUCOSE SERPL-MCNC: 223 MG/DL (ref 70–110)
HCT VFR BLD AUTO: 27.8 % (ref 37–48.5)
HGB BLD-MCNC: 9.2 G/DL (ref 12–16)
HYPOCHROMIA BLD QL SMEAR: ABNORMAL
IMM GRANULOCYTES # BLD AUTO: 0.21 K/UL (ref 0–0.04)
IMM GRANULOCYTES NFR BLD AUTO: 1.3 % (ref 0–0.5)
LYMPHOCYTES # BLD AUTO: 4.2 K/UL (ref 1–4.8)
LYMPHOCYTES NFR BLD: 26.1 % (ref 18–48)
MAGNESIUM SERPL-MCNC: 1.7 MG/DL (ref 1.6–2.6)
MCH RBC QN AUTO: 27.8 PG (ref 27–31)
MCHC RBC AUTO-ENTMCNC: 33.1 G/DL (ref 32–36)
MCV RBC AUTO: 84 FL (ref 82–98)
MONOCYTES # BLD AUTO: 1.8 K/UL (ref 0.3–1)
MONOCYTES NFR BLD: 11.3 % (ref 4–15)
NEUTROPHILS # BLD AUTO: 9.3 K/UL (ref 1.8–7.7)
NEUTROPHILS NFR BLD: 58.3 % (ref 38–73)
NRBC BLD-RTO: 0 /100 WBC
OVALOCYTES BLD QL SMEAR: ABNORMAL
PHOSPHATE SERPL-MCNC: 4.3 MG/DL (ref 2.7–4.5)
PLATELET # BLD AUTO: 817 K/UL (ref 150–450)
PLATELET BLD QL SMEAR: ABNORMAL
PMV BLD AUTO: 10.3 FL (ref 9.2–12.9)
POCT GLUCOSE: 203 MG/DL (ref 70–110)
POCT GLUCOSE: 220 MG/DL (ref 70–110)
POCT GLUCOSE: 220 MG/DL (ref 70–110)
POIKILOCYTOSIS BLD QL SMEAR: SLIGHT
POTASSIUM SERPL-SCNC: 5 MMOL/L (ref 3.5–5.1)
RBC # BLD AUTO: 3.31 M/UL (ref 4–5.4)
SODIUM SERPL-SCNC: 130 MMOL/L (ref 136–145)
WBC # BLD AUTO: 15.92 K/UL (ref 3.9–12.7)

## 2022-04-25 PROCEDURE — 99233 PR SUBSEQUENT HOSPITAL CARE,LEVL III: ICD-10-PCS | Mod: AF,HB,, | Performed by: PSYCHIATRY & NEUROLOGY

## 2022-04-25 PROCEDURE — 36415 COLL VENOUS BLD VENIPUNCTURE: CPT

## 2022-04-25 PROCEDURE — 90833 PR PSYCHOTHERAPY W/PATIENT W/E&M, 30 MIN (ADD ON): ICD-10-PCS | Mod: AF,HB,, | Performed by: PSYCHIATRY & NEUROLOGY

## 2022-04-25 PROCEDURE — 94640 AIRWAY INHALATION TREATMENT: CPT

## 2022-04-25 PROCEDURE — 90833 PSYTX W PT W E/M 30 MIN: CPT | Mod: AF,HB,, | Performed by: PSYCHIATRY & NEUROLOGY

## 2022-04-25 PROCEDURE — 80069 RENAL FUNCTION PANEL: CPT

## 2022-04-25 PROCEDURE — 99233 SBSQ HOSP IP/OBS HIGH 50: CPT | Mod: AF,HB,, | Performed by: PSYCHIATRY & NEUROLOGY

## 2022-04-25 PROCEDURE — 27000207 HC ISOLATION

## 2022-04-25 PROCEDURE — 25000003 PHARM REV CODE 250: Performed by: NURSE PRACTITIONER

## 2022-04-25 PROCEDURE — 25000003 PHARM REV CODE 250: Performed by: PSYCHIATRY & NEUROLOGY

## 2022-04-25 PROCEDURE — 25000003 PHARM REV CODE 250: Performed by: HOSPITALIST

## 2022-04-25 PROCEDURE — 94761 N-INVAS EAR/PLS OXIMETRY MLT: CPT

## 2022-04-25 PROCEDURE — 85025 COMPLETE CBC W/AUTO DIFF WBC: CPT

## 2022-04-25 PROCEDURE — 11000001 HC ACUTE MED/SURG PRIVATE ROOM

## 2022-04-25 PROCEDURE — 25000003 PHARM REV CODE 250: Performed by: STUDENT IN AN ORGANIZED HEALTH CARE EDUCATION/TRAINING PROGRAM

## 2022-04-25 PROCEDURE — 25000003 PHARM REV CODE 250

## 2022-04-25 PROCEDURE — 83735 ASSAY OF MAGNESIUM: CPT

## 2022-04-25 RX ORDER — DIVALPROEX SODIUM 500 MG/1
500 TABLET, DELAYED RELEASE ORAL DAILY
Qty: 30 TABLET | Refills: 11 | Status: SHIPPED | OUTPATIENT
Start: 2022-04-26 | End: 2022-04-25 | Stop reason: SDUPTHER

## 2022-04-25 RX ORDER — DOXYCYCLINE 100 MG/1
100 CAPSULE ORAL EVERY 12 HOURS
Qty: 18 CAPSULE | Refills: 0
Start: 2022-04-25 | End: 2022-04-26 | Stop reason: SDUPTHER

## 2022-04-25 RX ORDER — RISPERIDONE 1 MG/1
3 TABLET, ORALLY DISINTEGRATING ORAL DAILY
Status: DISCONTINUED | OUTPATIENT
Start: 2022-04-26 | End: 2022-04-26 | Stop reason: HOSPADM

## 2022-04-25 RX ORDER — HYDROXYZINE PAMOATE 50 MG/1
50 CAPSULE ORAL 3 TIMES DAILY
Qty: 90 CAPSULE | Refills: 2
Start: 2022-04-25 | End: 2022-04-26 | Stop reason: SDUPTHER

## 2022-04-25 RX ORDER — DIVALPROEX SODIUM 500 MG/1
1000 TABLET, DELAYED RELEASE ORAL NIGHTLY
Qty: 60 TABLET | Refills: 11
Start: 2022-04-25 | End: 2022-04-25 | Stop reason: HOSPADM

## 2022-04-25 RX ORDER — DIVALPROEX SODIUM 250 MG/1
1250 TABLET, DELAYED RELEASE ORAL NIGHTLY
Qty: 150 TABLET | Refills: 11
Start: 2022-04-25 | End: 2022-04-26 | Stop reason: SDUPTHER

## 2022-04-25 RX ORDER — OLANZAPINE 10 MG/2ML
10 INJECTION, POWDER, FOR SOLUTION INTRAMUSCULAR EVERY 8 HOURS PRN
Qty: 10 EACH | Refills: 3
Start: 2022-04-25 | End: 2022-04-26 | Stop reason: HOSPADM

## 2022-04-25 RX ORDER — LANOLIN ALCOHOL/MO/W.PET/CERES
400 CREAM (GRAM) TOPICAL 2 TIMES DAILY
Qty: 30 TABLET | Refills: 3
Start: 2022-04-25 | End: 2022-04-26 | Stop reason: SDUPTHER

## 2022-04-25 RX ORDER — DIVALPROEX SODIUM 500 MG/1
500 TABLET, DELAYED RELEASE ORAL DAILY
Qty: 30 TABLET | Refills: 11
Start: 2022-04-26 | End: 2022-04-26 | Stop reason: SDUPTHER

## 2022-04-25 RX ORDER — RISPERIDONE 2 MG/1
2 TABLET, ORALLY DISINTEGRATING ORAL DAILY
Qty: 30 TABLET | Refills: 11
Start: 2022-04-26 | End: 2022-04-25 | Stop reason: HOSPADM

## 2022-04-25 RX ORDER — METRONIDAZOLE 500 MG/1
500 TABLET ORAL EVERY 8 HOURS
Qty: 27 TABLET | Refills: 0
Start: 2022-04-25 | End: 2022-04-26 | Stop reason: SDUPTHER

## 2022-04-25 RX ORDER — RISPERIDONE 3 MG/1
3 TABLET, ORALLY DISINTEGRATING ORAL NIGHTLY
Qty: 30 TABLET | Refills: 11
Start: 2022-04-25 | End: 2022-04-25 | Stop reason: SDUPTHER

## 2022-04-25 RX ORDER — RISPERIDONE 3 MG/1
3 TABLET, ORALLY DISINTEGRATING ORAL 2 TIMES DAILY
Qty: 30 TABLET | Refills: 11
Start: 2022-04-25 | End: 2022-04-26 | Stop reason: HOSPADM

## 2022-04-25 RX ADMIN — METRONIDAZOLE 500 MG: 500 TABLET ORAL at 05:04

## 2022-04-25 RX ADMIN — FLUTICASONE FUROATE AND VILANTEROL TRIFENATATE 1 PUFF: 100; 25 POWDER RESPIRATORY (INHALATION) at 08:04

## 2022-04-25 RX ADMIN — ACETAMINOPHEN 650 MG: 325 TABLET ORAL at 02:04

## 2022-04-25 RX ADMIN — MICONAZOLE NITRATE 2 % TOPICAL POWDER: at 08:04

## 2022-04-25 RX ADMIN — INSULIN DETEMIR 10 UNITS: 100 INJECTION, SOLUTION SUBCUTANEOUS at 09:04

## 2022-04-25 RX ADMIN — APIXABAN 10 MG: 5 TABLET, FILM COATED ORAL at 08:04

## 2022-04-25 RX ADMIN — HYDROXYZINE PAMOATE 50 MG: 25 CAPSULE ORAL at 09:04

## 2022-04-25 RX ADMIN — METOPROLOL TARTRATE 50 MG: 50 TABLET, FILM COATED ORAL at 09:04

## 2022-04-25 RX ADMIN — METOPROLOL TARTRATE 50 MG: 50 TABLET, FILM COATED ORAL at 08:04

## 2022-04-25 RX ADMIN — ASPIRIN 81 MG CHEWABLE TABLET 81 MG: 81 TABLET CHEWABLE at 08:04

## 2022-04-25 RX ADMIN — RISPERIDONE 3 MG: 1 TABLET, ORALLY DISINTEGRATING ORAL at 09:04

## 2022-04-25 RX ADMIN — RISPERIDONE 2 MG: 1 TABLET, ORALLY DISINTEGRATING ORAL at 08:04

## 2022-04-25 RX ADMIN — Medication 400 MG: at 09:04

## 2022-04-25 RX ADMIN — METRONIDAZOLE 500 MG: 500 TABLET ORAL at 09:04

## 2022-04-25 RX ADMIN — MICONAZOLE NITRATE 2 % TOPICAL POWDER: at 09:04

## 2022-04-25 RX ADMIN — INSULIN ASPART 2 UNITS: 100 INJECTION, SOLUTION INTRAVENOUS; SUBCUTANEOUS at 08:04

## 2022-04-25 RX ADMIN — DIVALPROEX SODIUM 1250 MG: 250 TABLET, DELAYED RELEASE ORAL at 09:04

## 2022-04-25 RX ADMIN — INSULIN ASPART 4 UNITS: 100 INJECTION, SOLUTION INTRAVENOUS; SUBCUTANEOUS at 04:04

## 2022-04-25 RX ADMIN — INSULIN ASPART 1 UNITS: 100 INJECTION, SOLUTION INTRAVENOUS; SUBCUTANEOUS at 09:04

## 2022-04-25 RX ADMIN — PRAVASTATIN SODIUM 40 MG: 40 TABLET ORAL at 09:04

## 2022-04-25 RX ADMIN — GABAPENTIN 300 MG: 300 CAPSULE ORAL at 09:04

## 2022-04-25 RX ADMIN — HYDROCODONE BITARTRATE AND ACETAMINOPHEN 1 TABLET: 10; 325 TABLET ORAL at 09:04

## 2022-04-25 RX ADMIN — DIVALPROEX SODIUM 500 MG: 250 TABLET, DELAYED RELEASE ORAL at 08:04

## 2022-04-25 RX ADMIN — ACETAMINOPHEN 650 MG: 325 TABLET ORAL at 06:04

## 2022-04-25 RX ADMIN — HYDROXYZINE PAMOATE 50 MG: 25 CAPSULE ORAL at 04:04

## 2022-04-25 RX ADMIN — FAMOTIDINE 20 MG: 20 TABLET ORAL at 08:04

## 2022-04-25 RX ADMIN — FERROUS SULFATE TAB EC 325 MG (65 MG FE EQUIVALENT) 1 EACH: 325 (65 FE) TABLET DELAYED RESPONSE at 08:04

## 2022-04-25 RX ADMIN — Medication 400 MG: at 08:04

## 2022-04-25 RX ADMIN — INSULIN ASPART 4 UNITS: 100 INJECTION, SOLUTION INTRAVENOUS; SUBCUTANEOUS at 11:04

## 2022-04-25 RX ADMIN — HYDROXYZINE PAMOATE 50 MG: 25 CAPSULE ORAL at 08:04

## 2022-04-25 RX ADMIN — ACETAMINOPHEN 650 MG: 325 TABLET ORAL at 08:04

## 2022-04-25 RX ADMIN — GABAPENTIN 300 MG: 300 CAPSULE ORAL at 08:04

## 2022-04-25 RX ADMIN — METRONIDAZOLE 500 MG: 500 TABLET ORAL at 04:04

## 2022-04-25 RX ADMIN — ACETAMINOPHEN 650 MG: 325 TABLET ORAL at 04:04

## 2022-04-25 NOTE — ASSESSMENT & PLAN NOTE
Chronic, controlled.  Latest blood pressure and vitals reviewed-   Temp:  [96.5 °F (35.8 °C)-99.1 °F (37.3 °C)]   Pulse:  [73-90]   Resp:  [18-20]   BP: (108-141)/(53-66)   SpO2:  [95 %-99 %] .   Home meds for hypertension were reviewed and noted below.   Hypertension Medications             furosemide (LASIX) 20 MG tablet TAKE 1 TABLET(20 MG) BY MOUTH EVERY DAY    lisinopriL 10 MG tablet Take 1 tablet (10 mg total) by mouth once daily.    metoprolol tartrate (LOPRESSOR) 50 MG tablet TAKE 1 TABLET(50 MG) BY MOUTH TWICE DAILY        While in the hospital, will manage blood pressure as follows; Adjust home antihypertensive regimen as follows- Hold lisinopril and lasix in the setting of ODESSA, resume lopressor    Will utilize p.r.n. blood pressure medication only if patient's blood pressure greater than  180/110 and she develops symptoms such as worsening chest pain or shortness of breath.

## 2022-04-25 NOTE — ASSESSMENT & PLAN NOTE
A1C 7.0  -Hold metformin  -accuchecks and MDSSI  -diabetic diet  -cont neurontin  Currently at goal for hospitalization   ANTICOAGULATION MANAGEMENT     Alvin Ontiveros 69 year old male is on warfarin with supratherapeutic INR result. (Goal INR 2.0-3.0)    Recent labs: (last 7 days)     10/18/21  1420   INR 3.3*       ASSESSMENT     Source(s): Chart Review and Patient/Caregiver Call       Warfarin doses taken: More warfarin taken than planned which may be affecting INR    Diet: No new diet changes identified    New illness, injury, or hospitalization: No    Medication/supplement changes: None noted    Signs or symptoms of bleeding or clotting: No    Previous INR: Therapeutic last 2(+) visits    Additional findings: None     PLAN     Recommended plan for temporary change(s) affecting INR     Dosing Instructions: Partial hold then continue your current warfarin dose with next INR in 2 weeks. Patient takes warfarin in the morning, he will take 2.5 10/19/21 instead of 5 mg.       Summary  As of 10/18/2021    Full warfarin instructions:  10/19: 2.5 mg; Otherwise 2.5 mg every Sun, Fri; 5 mg all other days   Next INR check:  11/2/2021             Telephone call with Alvin who verbalizes understanding and agrees to plan    Lab visit scheduled    Education provided: Please call back if any changes to your diet, medications or how you've been taking warfarin and Contact [phone]  with any changes, questions or concerns.     Plan made per [facility] anticoagulation protocol    Luna Oakley RN  Anticoagulation Clinic  10/18/2021    _______________________________________________________________________     Anticoagulation Episode Summary     Current INR goal:  2.0-3.0   TTR:  89.4 % (1 y)   Target end date:  Indefinite   Send INR reminders to:  [facility]    Indications    Long-term (current) use of anticoagulants [Z79.01] [Z79.01]  Heterozygous factor V Leiden mutation (H) [D68.51]  Heterozygous Prothrombin Gene Mutation [D68.52]  Personal history of venous thrombosis and embolism [Z86.718]           Comments:  Takes in AM,  Call cell phone         Anticoagulation Care Providers     Provider Role Specialty Phone number    Du Almaraz MD Referring Internal Medicine [phone]

## 2022-04-25 NOTE — PLAN OF CARE
Discharge orders noted. CM placed PFC request for IP Psych placement. TN also awaiting facility transfer orders from MD team (Aniya-JERSON guthrie). Patient's original PEC/CEC placed in transfer packet. Copy of PEC/CEC placed in blue folder. TN messaged VN and bedside nurse Norah to update. Podiatry follow-up scheduled. TN will continue to follow.    Per psych note 4/22: - Patient is now in a Judicial Commitment Petition Filed Status (filed on 04/21/2022) due to continued grave disability 2/2 mental illness at this time (will have court paperwork scanned into chart today).      Per Drew with transfer center: I was informed that because she is under a judicial commitment that does not come through us at the transfer center and instead goes through case management. TN asked Drew to call CM back.    Drew confirmed CM would have to work on placement. I updated bedside nurse and VN.    Patient Contacts    Name Relation Home Work Mobile   Anitra Cain Sister [phone]     Tabitha Man Other [phone]     Catia Weathers Mother   [phone]   Ana Lopez Daughter   [phone]       Future Appointments   Date Time Provider Department Center   4/28/2022  2:00 PM Donaldo Pena MD [facility] [place] [facility]   4/28/2022  2:30 PM Carley Palomo [facility] SMO[facility] [place]   5/9/2022  2:00 PM Saloni De Anda MD [facility] GASTRO [place] Cli   5/26/2022  3:00 PM Maira De Los Santos DPM [facility] POD [place]         04/25/22 1239   Final Note   Assessment Type Final Discharge Note   Anticipated Discharge Disposition Psych   Hospital Resources/Appts/Education Provided Appointments scheduled by Navigator/Coordinator   Post-Acute Status   Post-Acute Authorization Placement   Post-Acute Placement Status Referrals Sent   Discharge Delays (!) Other  (PFC request initiated for IP Psych placement.)     Sammi Root RN    [phone]

## 2022-04-25 NOTE — PLAN OF CARE
TN awaiting call back from Director Tijerina regarding direction for IP Psych placement with CHANCE filed status.     CM called to speak with admissions staff at [facility] ([phone]). Unable to speak with CM at this time.     CM called and and spoke with [facility] admissions staff ([phone]). They informed that placements go through their transfer center. I told them CHANCE petition filed,  has to place patient. Admissions staff was able to pull patient's packet from before. They are unable to accept patient. They do not have the staff to accommodate patient (wound care, etc).    CM attempted to call [facility] [phone]. Unable to contact anyone.    [facility] [phone] unable to accept patient at any of their locations. They do not perform wound care.    [facility] [phone] unable to accept due to wound care needed.    Per previous SW note: SW spoke with Neel with [facility] [phone] they will not be able to accept pt back due to pt's wound care needs exceeding their capacity.    Left message Prasanna at [facility] [phone]. Prasanna called TN back, they do not do wound care.    CM called [facility] unit [phone]. Patient's wound care is too complicated for them. Unable to accept.    CM awaiting call from [facility] ([phone]) after they review patient.     Referrals sent to other IP Psych facilities via [facility].    1545--TN spoke with patient's sister Anitra. She is aware of placement efforts. TN will follow-up with sister tomorrow.    1630--TN sent message to [facility] inquiring about MRSA status and isolation. CM having difficulty with placement due to isolation status and wound care. 1650--Per Aniya, patient needs to be on contact isolation for MRSA (at least until 5/4 in which abx completed) I updated CM Director Lilliana.    1700--Per  [facility] they moved the [facility] request back to pending and have a list of everywhere it was sent.    Patient Contacts    Name Relation Home Work Mobile   Anitra Cain Sister [phone]     Tabitha Man Other [phone]     Catia Weathers Mother   [phone]   Ana Lopez Daughter   [phone]      04/25/22 1446   Post-Acute Status   Post-Acute Authorization Placement   Post-Acute Placement Status Referrals Sent

## 2022-04-25 NOTE — DISCHARGE SUMMARY
[facility] Medicine  Discharge Summary      Patient Name: Audrey Natarajan  MRN: [id number]  Patient Class: IP- Inpatient  Admission Date: 4/12/2022  Hospital Length of Stay: 7 days  Discharge Date and Time: 04/26/2022 5:00 pm  Attending Physician: Augusto May MD   Discharging Provider: Aniya Ramirez PA-C  Primary Care Provider: Donaldo Pena MD      HPI:   Audrey Natarajan is a 52 yo female with a pmh of DM2, bipolar 1 disorder, cellulitis, COPD, HTN, CAD, DVT/PE. She presented to the ED with c/o fevers x 1 day. She also has BLE swelling and pain and wounds to both feet, worsening x 2 weeks. She had fever up to 102F yesterday. She reports the wounds to her right foot are from dragging her feet while she was angry. Denies drainage to foot wounds. She has had blood clots in the past and has an IVC filter placed in 2012. She was sent here from [facility] where she was under CEC for psychosis. ED workup revealed BLE DVT on venous US, WBC 31, and Hgb 9.9. She received a dose of vancomycin while in the ED.       * No surgery found *      Hospital Course:   Admitted for diabetic foot infection, cellulitis, and DVT. Patient is now in a Judicial Commitment Petition Filed Status (filed on 04/21/2022) due to continued grave disability 2/2 mental illness at this time (will have court paperwork scanned into chart once received from the hospital ). History of DVT/PE, hypercoagulable state, IVC filter in place. Therapeutic lovenox initiated, transitioned to eliquis. Podiatry consulted for diabetic foot ulcer, debrided on 4/13 with cultures obtained - growing MRSA. Started on IV Clindamycin, switched to bactrim with flagyl, with further discontinuation of bactrim and initiation of vancomycin. ID consulted to follow for antibiotic regimen. Psych consulted for medication evaluation, pt with CEC from behavioral health facility. Now a Judicial Commitment Petition Filed Status (filed  "4/21) due to continued grave disability 2/2 mental illness at this time (will have court paperwork scanned into chart once received from the hospital ) facility. Developed ODESSA with increase in Cr to 2.4 as well as hyperkalemia, nephrology consulted for assistance in management. ODESSA and hyperkalemia have since resolved (4/25). Renal US showed minimally dilated central renal collecting system on the left and mildly elevated resistive indices, findings which may be seen in setting of medical renal disease. Heme/onc consulted given iliofemoral lymphadenopathy, possibly reactive or neoplastic identified on CT abd/pelvis on 4/19, likely secondary to panniculitis, no further work up needed. Patient stable for discharge to inpatient psych facility with two week course (end date 5/4) of doxycycline and metronidazole with podiatry and PCP follow up. Contact precautions required for MRSA infection present barrier to discharge as patient is not likely to be accepted by an inpatient psych facility with active precautions. [facility] worked toward placement for patient.      On day of discharge, inpatient psychiatrist rescinded Judicial Commitment Petition Filed Status due to patient no longer being gravely disabled due to mental illness at this time. Per Psychiatrist, Dr. Del Real note, "I spoke to the patient's sister (Anitra) today, and she feels that the patient has improved to the point of no longer meeting PEC / CEC / CHANCE criteria and no longer requires further inpatient psychiatric treatment (I am in agreement).  Plan at this time is to discharge to home to live with step-daughter today with ASAP outpatient psychiatric f/u at [facility] with her well-established psychiatrist (Dr. Labadie). atnathan (and her sister and step-daughter) were instructed to call 911 and return to the nearest ED if patient begins feeling suicidal, homicidal, or gravely disabled (for s/p this hospitalization). " Patient will be picked up by her " "Sister.    Patient offered  for wound care and refused, stating that she felt able to change her dressings as needed and would follow up with PCP.     Patient advised that she would need to call her psychiatrist for a follow up appointment. She voiced understanding and ability to do so. Return precautions discussed. All questions and concerns answered at this time.    Goals of Care Treatment Prefe rences:  Code Status: Full Code    Living Will: Yes              Consults:   Consults (From admission, onward)        Status Ordering Provider     Inpatient consult to Hematology/Oncology  Once        Provider:  (Not yet assigned)    Completed MARTHA HOGAN     Inpatient consult to Nephrology  Once        Provider:  (Not yet assigned)    Completed MANDEEP RUTHERFORD     Pharmacy to dose Vancomycin consult  Once        Provider:  (Not yet assigned)   "And" Linked Group Details    Acknowledged MANDEEP RUTHERFORD     Inpatient consult to Infectious Diseases  Once        Provider:  (Not yet assigned)    Completed MANDEEP RUTHERFORD     Inpatient virtual consult to Hospital Medicine  Once        Provider:  (Not yet assigned)    Completed MARTHA HOGAN     IP consult to case management  Once        Provider:  (Not yet assigned)    Acknowledged PATT MUNIZ     Inpatient consult to Podiatry  Once        Provider:  (Not yet assigned)    Completed CELINA PAYTON     Inpatient consult to Psychiatry  Once        Provider:  Magdiel Del Real MD    Completed CELINA PAYTON.          * Diabetic foot ulcer associated with type 2 diabetes mellitus  B/l foot ulcers  - Podiatry consulted,  - Xray in feet bilaterally, findings consistent with Charcot joint of left foot, no acute abnormality   - MRI ordered edema noted as well as charcot changes of left foot without evidence of osteomyelitis  - LLE ulcer debrided with deep cultures taken.   - Site dressed with xeroform and kerlix. Nursing orders in for daily dressing changes  - " She is NWB to [facility] due to ulcer and history of charcot foot.  - Will continue to follow  - Cont clindamycin - culture growing Staph aureus--sensitive to clinda, switched   - Cultures grew out MRSA and anerobic bacteria; patient is septic today and rising WBC; switched clinda to bactrim and adding flagyl;       - Leukocytosis improving today, per Heme/onc patient baseline around 15 given asplenia  - Continue vancomycin and flagyl  - Follow up on ID and podiatry recs    Lymphadenopathy, inguinal  See Panniculitis    Hyperkalemia  Developed during admission, increased to 5.4 and to 5.7    - Given Lokelma  - Nephrology folowing  - Renal US completed to rule out obstruction    Panniculitis  Lymphadenopathy, inguinal  Identified on CT abdomen pelvis on 4/19, no underlying hematoma or abscess    - Heme/Onc consulted, recommendations as follows  - likely secondary to Panniculitis  - no further workup needed    ODESSA (acute kidney injury)  No history of CKD  Creatinine baseline around 0.6-0.7, developed during admission likely secondary to bactrim use, increased to 2.4 and trended down to 1.0  US kidney showed renal US showed minimally dilated central renal collecting system on the left and mildly elevated resistive indices, findings which may be seen in setting of medical renal disease    - Currently resolved  - Daily Renal Function Panel  - Check Vanc levels before each dose  - Need strict I&Os  - Hold off on serological work up for now,  get basic pending labs today   - No Indication for RRT at this time, K+ acceptable, No Uremia symptoms.  - Avoid Hypotension.  - Renally dose all meds  - Please avoid nephrotoxins, including NSAIDs, aminoglycosides, IV contrast (unless absolutely necessary), gadolinium, fleets and other phosphorous-based laxatives. Caution with antibiotics    Sepsis due to methicillin resistant Staphylococcus aureus (MRSA)  - see principle problem    Anemia  Denies bleeding, baseline 12-13, 9.9 on  admit  Iron panel: Iron 13, TIBC 456, Sat Iron 3, Transferrin and Ferritin WNL    - Iron supplement  - Continue to monitor with daily CBC    Acute deep vein thrombosis (DVT) of both lower extremities  Hx of DVT/PE, hypercoagulable state, IVC filter in place  BLE venous US with thrombosis of bilateral posterior tibial veins  Initially started on therapeutic lovenox    - Continue Eliquis    SHAWN (obstructive sleep apnea)  Does not use CPAP    Psychosis  Bipolar 1 disorder    Per Psychiatry note,   - Patient is now in a Judicial Commitment Petition Filed Status (filed on 04/21/2022) due to continued grave disability 2/2 mental illness at this time (will have court paperwork scanned into chart once received from the hospital ).    - Once medically cleared / stable, seek transfer back to [facility] (or another inpatient psychiatric facility; patient may need Med-Psych placement) for continued mental health treatment / stabilization.  - Continue Risperdal 2 mg PO QAM & 3 mg PO QHS and Depakote ER 500 mg PO QAM and 1250 mg PO QHS for Bipolar I Disorder and insomnia   - Continue Vistaril 50 mg PO TID (change to scheduled) for anxiety and/or insomnia   - HOLD previously outpt prescribed Vyvanse  - Can use Zyprexa 10 mg PO/IM q8 hours PRN for non-redirectable psychotic / manic agitation   - Get repeat trough VPA level tomorrow (Saturday) AM;  - Continue suicide / violence precautions and continue to monitor the patient with a sitter while on a PEC / CEC / CHANCE filed status.   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  Latest blood pressure and vitals reviewed-   Temp:  [96.5 °F (35.8 °C)-99.1 °F (37.3 °C)]   Pulse:  [73-90]   Resp:  [18-20]   BP: (108-141)/(53-66)   SpO2:  [95 %-99 %] .   Home meds for hypertension were reviewed and noted below.   Hypertension Medications             furosemide (LASIX) 20 MG tablet TAKE 1 TABLET(20 MG) BY MOUTH EVERY DAY    lisinopriL 10 MG tablet Take 1 tablet (10 mg total) by mouth once daily.    metoprolol tartrate (LOPRESSOR) 50 MG tablet TAKE 1 TABLET(50 MG) BY MOUTH TWICE DAILY        While in the hospital, will manage blood pressure as follows; Adjust home antihypertensive regimen as follows- Hold lisinopril and lasix in the setting of ODESSA, resume lopressor    Will utilize p.r.n. blood pressure medication only if patient's blood pressure greater than  180/110 and she develops symptoms such as worsening chest pain or shortness of breath.    Final Active Diagnoses:    Diagnosis Date Noted POA    PRINCIPAL PROBLEM:  Diabetic foot ulcer associated with type 2 diabetes mellitus [E11.621, L97.509] 04/13/2022 Yes    Hyperkalemia [E87.5] 04/21/2022 No    Lymphadenopathy, inguinal [R59.0] 04/21/2022 Unknown    ODESSA (acute kidney injury) [N17.9] 04/20/2022 No    Panniculitis [M79.3]  Unknown    Sepsis due to methicillin resistant Staphylococcus aureus (MRSA) [A41.02] 04/18/2022 Yes    Acute deep vein thrombosis (DVT) of both lower extremities [I82.403] 04/13/2022 Yes    Anemia [D64.9] 04/13/2022 Yes    SHAWN (obstructive sleep apnea) [G47.33] 02/09/2022 Yes    Psychosis [F29] 02/14/2021 Yes    Cellulitis of lower  extremity [L03.119] 02/10/2021 Yes    Thrombocytosis [D75.839] 07/16/2016 Yes    Bipolar 1 disorder [F31.9] 04/13/2015 Yes     Chronic    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 [E11.40] 01/13/2015 Yes    COPD (chronic obstructive pulmonary disease) [J44.9] 10/11/2013 Yes     Chronic    Essential hypertension [I10] 06/06/2013 Yes     Chronic      Problems Resolved During this Admission:       Discharged Condition: good    Disposition: Psychiatric Hospital    Follow Up:    Patient Instructions:      Ambulatory referral/consult to Podiatry   Standing Status: Future   Referral Priority: Routine Referral Type: Consultation   Referral Reason: Specialty Services Required   Referred to Provider: MP SAM Requested Specialty: Podiatry   Number of Visits Requested: 1     Diet diabetic     Notify your health care provider if you experience any of the following:  temperature >100.4     Notify your health care provider if you experience any of the following:  persistent nausea and vomiting or diarrhea     Notify your health care provider if you experience any of the following:  difficulty breathing or increased cough     Notify your health care provider if you experience any of the following:  redness, tenderness, or signs of infection (pain, swelling, redness, odor or green/yellow discharge around incision site)     Notify your health care provider if you experience any of the following:  severe uncontrolled pain     Notify your health care provider if you experience any of the following:  worsening rash     Notify your health care provider if you experience any of the following:  increased confusion or weakness     Leave dressing on - Keep it clean, dry, and intact until clinic visit     Change dressing (specify)   Order Comments: Dressing changes 3x/week: cleanse wounds on both feet with saline, apply hydrofera blue, wrap both feet with cast padding followed by coban.     Weight bearing restrictions  (specify):   Order Comments: -Protected WB RLE in surgical shoe. NWB LLE when possible due to ulcer and Charcot, heel WB for transfers.       Significant Diagnostic Studies: Labs:   BMP:   Recent Labs   Lab 04/24/22 0225 04/25/22 0727   * 223*   * 130*   K 5.2* 5.0   CL 97 93*   CO2 27 28   BUN 19 15   CREATININE 0.7 0.7   CALCIUM 9.2 9.4   MG  --  1.7   , CMP   Recent Labs   Lab 04/24/22 0225 04/25/22 0727   * 130*   K 5.2* 5.0   CL 97 93*   CO2 27 28   * 223*   BUN 19 15   CREATININE 0.7 0.7   CALCIUM 9.2 9.4   ALBUMIN 2.8* 2.8*   ANIONGAP 10 9   ESTGFRAFRICA >60 >60   EGFRNONAA >60 >60   , CBC   Recent Labs   Lab 04/25/22 0727   WBC 15.92*   HGB 9.2*   HCT 27.8*   *    and All labs within the past 24 hours have been reviewed    Pending Diagnostic Studies:     Procedure Component Value Units Date/Time    US Retroperitoneal Complete [[id number]]     Order Status: Sent Lab Status: No result          Medications:  Reconciled Home Medications:      Medication List      START taking these medications    * divalproex 250 MG EC tablet  Commonly known as: DEPAKOTE  Take 5 tablets (1,250 mg total) by mouth every evening.  Replaces: divalproex  MG Tb24     * divalproex 500 MG Tbec  Commonly known as: DEPAKOTE  Take 1 tablet (500 mg total) by mouth once daily. PO QAM  Start taking on: April 26, 2022     doxycycline 100 MG Cap  Commonly known as: VIBRAMYCIN  Take 1 capsule (100 mg total) by mouth every 12 (twelve) hours. for 9 days     enoxaparin 100 mg/mL Syrg  Commonly known as: LOVENOX  Inject 1 mL (100 mg total) into the skin every 12 (twelve) hours.     * hydrOXYzine pamoate 50 MG Cap  Commonly known as: VISTARIL  Take 1 capsule (50 mg total) by mouth every 8 (eight) hours as needed (insomnia).     * hydrOXYzine pamoate 50 MG Cap  Commonly known as: VISTARIL  Take 1 capsule (50 mg total) by mouth 3 (three) times daily.     magnesium oxide 400 mg (241.3 mg magnesium)  tablet  Commonly known as: MAG-OX  Take 1 tablet (400 mg total) by mouth 2 (two) times daily.     metroNIDAZOLE 500 MG tablet  Commonly known as: FLAGYL  Take 1 tablet (500 mg total) by mouth every 8 (eight) hours. for 9 days  Replaces: metronidazole 1% 1 % Gel     OLANZapine injection  Commonly known as: ZyPREXA  Inject 10 mg into the muscle every 8 (eight) hours as needed for Agitation (non-redirectable psychotic / manic agitation).     risperiDONE 3 MG disintegrating tablet  Commonly known as: RISPERDAL M-TABS  Take 1 tablet (3 mg total) by mouth 2 (two) times daily.  Replaces: risperiDONE 4 MG tablet         * This list has 4 medication(s) that are the same as other medications prescribed for you. Read the directions carefully, and ask your doctor or other care provider to review them with you.            CHANGE how you take these medications    albuterol 90 mcg/actuation inhaler  Commonly known as: PROVENTIL/VENTOLIN HFA  Inhale 2 puffs into the lungs every 6 (six) hours as needed for Wheezing. Use with spacer  Dispense with 1 spacer  What changed: Another medication with the same name was removed. Continue taking this medication, and follow the directions you see here.        CONTINUE taking these medications    acetaminophen 500 MG tablet  Commonly known as: TYLENOL  Take 2 tablets (1,000 mg total) by mouth every 6 (six) hours as needed for Pain.     albuterol-ipratropium 2.5 mg-0.5 mg/3 mL nebulizer solution  Commonly known as: DUO-NEB  Take 3 mLs by nebulization every 6 (six) hours as needed for Wheezing or Shortness of Breath. Rescue     aspirin 81 MG Chew  Take 1 tablet (81 mg total) by mouth once daily.     blood sugar diagnostic Strp  To check BG two  times daily, to use with insurance preferred meter     * blood-glucose meter kit  To check BG once daily, to use with insurance preferred meter     * blood-glucose meter kit  To check BG two times daily, to use with insurance preferred meter     * TRUE  METRIX GLUCOSE METER Misc  Generic drug: blood-glucose meter  CHECK BLOOD SUGAR TWICE DAILY     dicyclomine 20 mg tablet  Commonly known as: BENTYL  Take 1 tablet (20 mg total) by mouth every 6 (six) hours.     DUPIXENT  mg/2 mL Pnij  Generic drug: dupilumab  SMARTSI Milligram(s) SUB-Q Every 2 Weeks     EpitoL 200 mg tablet  Generic drug: carBAMazepine     famotidine 20 MG tablet  Commonly known as: PEPCID  Take 20 mg by mouth 2 (two) times daily.     fluticasone propionate 50 mcg/actuation nasal spray  Commonly known as: FLONASE  1 spray (50 mcg total) by Each Nostril route 2 (two) times daily.     fluticasone-salmeterol 250-50 mcg/dose 250-50 mcg/dose diskus inhaler  Commonly known as: ADVAIR  Inhale 1 puff into the lungs 2 (two) times daily. Controller     folic acid 1 MG tablet  Commonly known as: FOLVITE  Take 1 tablet (1 mg total) by mouth once daily.     furosemide 20 MG tablet  Commonly known as: LASIX  TAKE 1 TABLET(20 MG) BY MOUTH EVERY DAY     gabapentin 300 MG capsule  Commonly known as: NEURONTIN  TAKE 2 CAPSULES(600 MG) BY MOUTH TWICE DAILY     hydrOXYzine 50 MG tablet  Commonly known as: ATARAX  Take 50 mg by mouth 4 (four) times daily as needed.     ibuprofen 600 MG tablet  Commonly known as: ADVIL,MOTRIN  TAKE 1 TABLET(600 MG) BY MOUTH EVERY 8 HOURS AS NEEDED FOR PAIN OR BACK PAIN     lancets [facility]  To check BG two times daily, to use with insurance preferred meter     lisinopriL 10 MG tablet  Take 1 tablet (10 mg total) by mouth once daily.     loratadine 10 mg tablet  Commonly known as: CLARITIN  Take 1 tablet (10 mg total) by mouth once daily.     metFORMIN 1000 MG tablet  Commonly known as: GLUCOPHAGE  TAKE 1 TABLET(1000 MG) BY MOUTH TWICE DAILY WITH MEALS     metoprolol tartrate 50 MG tablet  Commonly known as: LOPRESSOR  TAKE 1 TABLET(50 MG) BY MOUTH TWICE DAILY     multivitamin Tab  Take 1 tablet by mouth once daily.     OPTICHAMBER BIGG LG MASK Spcr  Generic drug:  inhalat.spacing dev,large mask  Inhale into the lungs.     pantoprazole 40 MG tablet  Commonly known as: PROTONIX  Take 1 tablet (40 mg total) by mouth once daily.     polyvinyl alcohol (artificial tears) 1.4 % ophthalmic solution  Commonly known as: LIQUIFILM TEARS  Place 1 drop into both eyes 4 (four) times daily.     pravastatin 40 MG tablet  Commonly known as: PRAVACHOL  TAKE 1 TABLET(40 MG) BY MOUTH EVERY EVENING     senna 8.6 mg tablet  Commonly known as: SENOKOT  Take 1 tablet by mouth 2 (two) times a day.         * This list has 3 medication(s) that are the same as other medications prescribed for you. Read the directions carefully, and ask your doctor or other care provider to review them with you.            STOP taking these medications    ammonium lactate 12 % lotion  Commonly known as: LAC-HYDRIN     diclofenac sodium 1 % Gel  Commonly known as: VOLTAREN     divalproex  MG Tb24  Commonly known as: DEPAKOTE  Replaced by: divalproex 250 MG EC tablet     haloperidoL 10 MG tablet  Commonly known as: HALDOL     methocarbamoL 500 MG Tab  Commonly known as: ROBAXIN     metronidazole 1% 1 % Gel  Commonly known as: METROGEL  Replaced by: metroNIDAZOLE 500 MG tablet     mupirocin 2 % ointment  Commonly known as: BACTROBAN     nystatin powder  Commonly known as: NYSTOP     QUEtiapine 200 MG Tab  Commonly known as: SEROQUEL     risperiDONE 4 MG tablet  Commonly known as: RISPERDAL  Replaced by: risperiDONE 3 MG disintegrating tablet     triamcinolone acetonide 0.1% 0.1 % ointment  Commonly known as: KENALOG     VYVANSE 40 MG Cap  Generic drug: lisdexamfetamine     white petrolatum 86.5 % Oint            Indwelling Lines/Drains at time of discharge:   Lines/Drains/Airways     None                 Time spent on the discharge of patient: 60 minutes         Aniya Ramirez PA-C  Department of Hospital Medicine  [facility]

## 2022-04-25 NOTE — PROGRESS NOTES
PSYCHIATRY INPATIENT PROGRESS NOTE  SUBSEQUENT HOSPITAL VISIT      4/25/2022 10:15 AM   Audrey Natarajan   1970   [id number]           DATE OF ADMISSION: 4/12/2022 11:28 PM    SITE: [facility]    CURRENT LEGAL STATUS: Judicial Commitment Petition Filed on 04/21/2022         HISTORY    Per Initial History from Primary Team:   Audrey Natarajan is a 52 yo female with a pmh of DM2, bipolar 1 disorder, cellulitis, COPD, HTN, CAD, DVT/PE. She presented to the ED with c/o fevers x 1 day. She also has BLE swelling and pain and wounds to both feet, worsening x 2 weeks. She had fever up to 102F yesterday. She reports the wounds to her right foot are from dragging her feet while she was angry. Denies drainage to foot wounds. She has had blood clots in the past and has an IVC filter placed in 2012. She was sent here from [facility] where she was under CEC for psychosis. ED workup revealed BLE DVT on venous US, WBC 31, and Hgb 9.9. She received a dose of vancomycin while in the ED.   Interval History from Primary Team on 04/24/2022:  No acute events overnight.  White blood cell count up again today.  Remains on Zosyn and vancomycin.       Chief Complaint / Reason for Original Psychiatry Consult: Psychosis / Bipolar I Disorder; Patient from [facility] on PEC/CEC       Subjective / Interval Psychiatric History Today (04/25/2022) (with psychiatric ROS below):   Audrey Natarajan is a 51 y.o. female with a past medical history as noted above/below, and a past psychiatric history of Bipolar I Disorder, psychosis, depression, and ADHD, currently being treated by her inpatient primary team for a principle problem of acute DVTs of BLEs and wound infection.  Psychiatry was originally consulted as noted above.  The patient was seen and examined again this AM with the resident.  The chart was reviewed again this AM.  No PRN Zyprexa was needed last night.  Patient was more calm, linear, and cooperative  initially today, but as the interview progressed, her thought process became increasingly tangential / circumferential / with TERRI, her speech became more rapid/pressured/requiring redirection, and her affect became more labile / tearful.  The patient remains alert and oriented to person, place, city, state, month, year, and situation.  She remains CAM-ICU negative for delirium.  She denies issues with sleep overnight.  She endorses a good appetite.  She denies AH, VH, or TH (no RIS observed).  She denies any current/recent passive/active SI/HI.  She denies any adverse effects to her current medication regimen.  She denies any current medical/physical complaints at this time.  NAD was observed during the examination.  See detailed psych ROS below.  Psychotherapy was again implemented as noted below with a focus on improving mood / bibi / thought process.  See A/P below.  While patient's mental health appears to be slowly improving, she continues to appear gravely disabled at this time and in need of further inpatient mental health stabilization / treatment.  I am reaching back out to the hospital  today to request a signed copy of the [employer] petition filed on 04/21/2022.          Psychiatric Review Of Systems - Currently, the patient is endorsing and/or denying the following:  (patient's endorsements are BOLDED below; if not BOLDED, then patient denied):     Endorses Symptoms of Depression: diminished mood, low motivation, loss of interest/anhedonia, irritability, diminished energy, change in sleep, change in appetite, diminished concentration or cognition or indecisiveness, PMA/R, excessive guilt or hopelessness or worthlessness, suicidal ideations     Denies issues with Sleep: initiation, maintenance, early morning awakening with inability to return to sleep     Denies Suicidal/Homicidal ideations: active/passive ideations, organized plans, future intentions     Endorses Symptoms of  psychosis: paranoia, hallucinations, delusions, disorganized thinking (initially more organized today as noted above), disorganized behavior or abnormal motor behavior, or negative symptoms (diminshed emotional expression, avolition, anhedonia, alogia, asociality)      Endorses Symptoms of bibi or hypomania: elevated, expansive, or irritable mood with increased energy or activity; with inflated self-esteem or grandiosity, decreased need for sleep, increased rate of speech, FOI or racing thoughts, distractibility, increased goal directed activity or PMA, risky/disinhibited behavior     Denies Symptoms of MALISSA: excessive anxiety/worry/fear, more days than not, about numerous issues, difficult to control, with restlessness, fatigue, poor concentration, irritability, muscle tension, sleep disturbance; causes functionally impairing distress      Denies Symptoms of Panic Disorder: recurrent panic attacks, precipitated or un-precipitated, source of worry and/or behavioral changes secondary; with or without agoraphobia     Denies Symptoms of PTSD: h/o trauma; re-experiencing/intrusive symptoms, avoidant behavior, negative alterations in cognition or mood, or hyperarousal symptoms; with or without dissociative symptoms      Denies Symptoms of OCD: obsessions or compulsions      Denies Symptoms of Eating Disorders: anorexia, bulimia or binging     Denies Substance Use: intoxication, withdrawal, tolerance, used in larger amounts or duration than intended, unsuccessful attempts to limit or quit, increased time engaging in or seeking out, cravings or strong desire to use, failure to fulfill obligations, negative consequences in social/interpersonal/occupational,/recreational areas, use in dangerous situations, medical or psychological consequences         PSYCHOTHERAPY ADD-ON +[id number]   30 (16-37*) minutes     Time: 18 minutes  Participants: Met with patient     Therapeutic Intervention Type: behavior modifying psychotherapy,  supportive psychotherapy  Why chosen therapy is appropriate versus another modality: relevant to diagnosis, patient responds to this modality, evidence based practice     Target symptoms: mood / bibi / TERRI   Primary focus: improving mood / bibi / thought process   Psychotherapeutic techniques: supportive and psychodynamic techniques; psycho-education; deep breathing exercises; CBT; problem solving techniques and managing life/medical stressors     Outcome monitoring methods: self-report, observation     Patient's response to intervention:  The patient's response to intervention is more accepting today / (remains limited due to psychosis / bibi)      Progress toward goals:  The patient's progress toward goals is fair / limited / slightly improved again today         ROS  General ROS: negative for - chills, fatigue, fever or night sweats  Ophthalmic ROS: negative for - blurry vision, double vision or eye pain  ENT ROS: negative for - sinus pain, headaches, sore throat or visual changes  Allergy and Immunology ROS: negative for - hives, itchy/watery eyes or nasal congestion  Hematological and Lymphatic ROS: negative for - bleeding problems, bruising, jaundice or pallor  Endocrine ROS: negative for - galactorrhea, hot flashes, mood swings, palpitations or temperature intolerance  Respiratory ROS: negative for - cough, hemoptysis, shortness of breath, tachypnea or wheezing  Cardiovascular ROS: negative for - chest pain, dyspnea on exertion, loss of consciousness, palpitations, rapid heart rate or shortness of breath  Gastrointestinal ROS: negative for - appetite loss, nausea, abdominal pain, blood in stools, change in bowel habits, constipation or diarrhea  Genito-Urinary ROS: negative for - incontinence, nocturia or pelvic pain  Musculoskeletal ROS: negative for - joint stiffness, joint pain, or myalgias   Neurological ROS: negative for - behavioral changes, confusion, dizziness, memory loss, numbness/tingling or  "seizures  Dermatological ROS: negative for dry skin, hair changes, pruritus or rash; positive for color change / wound (improving)  Psychiatric ROS: see detailed psychiatric ROS above in subjective section       PAST MEDICAL & SURGICAL HISTORY   Past Medical History:   Diagnosis Date    ADHD (attention deficit hyperactivity disorder)     Arthritis     Asthma     Bipolar 1 disorder     Cataract     COPD (chronic obstructive pulmonary disease)     Coronary artery disease     A fib    Depression     bipolar manic depresson    Diabetes mellitus     DVT of lower extremity, bilateral July 2013    bilateral LE DVT. Estelita filter placed.     Encounter for blood transfusion     History of blood clots 1. Left Leg=2003; 2.Bilateral Groin=Blood Clots= 5 or 6/ 2013 & 7/2013; 3. LLL of Lung=7/2013;  4. Lt. Lower Leg=7/2013.     Pt. had 1st Blood Clot after [id number], & Last=2013. Estelita Filter= Rt.Lateral Neck.    HTN (hypertension) 6/6/2013    Pt states that she does not have hypertension    Hypercholesteremia     Irregular heartbeat     Neuromuscular disorder     neuropathy feet    PE (pulmonary embolism) July 2013     bilat LE DVT.     Restless leg syndrome      Past Surgical History:   Procedure Laterality Date    ABDOMINAL SURGERY  2010    gastric sleeve    BILATERAL OOPHORECTOMY Bilateral 1/12/2015    CHOLECYSTECTOMY      Green' s filter Right 7/4/2012    Right Neck & Tunneled Down.    HERNIA REPAIR      "[place] [facility] Hern[facility] around th [place].", pt. states    LAPAROSCOPIC CHOLECYSTECTOMY N/A 9/10/2020    Procedure: CHOLECYSTECTOMY, LAPAROSCOPIC;  Surgeon: Montrell Gutierrez MD;  Location: [facility];  Service: General;  Laterality: N/A;  RN PREOP 9/9----COVID Negative  9/9    OVARIAN CYST REMOVAL  3/13/2014    MO REMOVAL OF OVARY/TUBE(S)      SPLENECTOMY, TOTAL  July 2003    TONSILLECTOMY      as a child    TYMPANOSTOMY TUBE PLACEMENT  1976    VEIN SURGERY  2003    Lt leg "       FAMILY HISTORY   Family History   Problem Relation Age of Onset    Hypertension Father     Diabetes Father     Heart disease Father     Cataracts Father     Diabetes Paternal Grandfather     Heart disease Paternal Grandfather     No Known Problems Mother     Ovarian cancer Maternal Grandmother          from this. ? age     No Known Problems Sister     No Known Problems Brother     No Known Problems Maternal Aunt     No Known Problems Maternal Uncle     No Known Problems Paternal Aunt     No Known Problems Paternal Uncle     No Known Problems Maternal Grandfather     Ovarian cancer Paternal Grandmother     Uterine cancer Neg Hx     Breast cancer Neg Hx     Colon cancer Neg Hx     Amblyopia Neg Hx     Blindness Neg Hx     Cancer Neg Hx     Glaucoma Neg Hx     Macular degeneration Neg Hx     Retinal detachment Neg Hx     Strabismus Neg Hx     Stroke Neg Hx     Thyroid disease Neg Hx        ALLERGIES   Review of patient's allergies indicates:   Allergen Reactions    Morphine Other (See Comments)     Patient had a psychotic episode after taking Morphine  Agitation, hallucinations    Penicillins Anaphylaxis     itching    Januvia [sitagliptin] Hives       CURRENT MEDICATION REGIMEN   Home Meds:   Prior to Admission medications    Medication Sig Start Date End Date Taking? Authorizing Provider   acetaminophen (TYLENOL) 500 MG tablet Take 2 tablets (1,000 mg total) by mouth every 6 (six) hours as needed for Pain. 21  Yes Lary Sweet, DO   albuterol (PROVENTIL/VENTOLIN HFA) 90 mcg/actuation inhaler Inhale 2 puffs into the lungs every 6 (six) hours as needed for Wheezing. Use with spacer  Dispense with 1 spacer 21 Yes Lary Sweet, DO   albuterol-ipratropium (DUO-NEB) 2.5 mg-0.5 mg/3 mL nebulizer solution Take 3 mLs by nebulization every 6 (six) hours as needed for Wheezing or Shortness of Breath. Rescue 21 Yes Donaldo Pena MD   aspirin 81 MG Chew  Take 1 tablet (81 mg total) by mouth once daily. 21 Yes Ifeoma Jacobs MD   blood sugar diagnostic Strp To check BG two  times daily, to use with insurance preferred meter 21  Yes Donaldo Pena MD   blood-glucose meter kit To check BG once daily, to use with insurance preferred meter 21 Yes Ifeoma Jacobs MD   dicyclomine (BENTYL) 20 mg tablet Take 1 tablet (20 mg total) by mouth every 6 (six) hours. 3/12/22  Yes Tu Arredondo PA-C   DUPIXENT  mg/2 mL PnIj SMARTSI Milligram(s) SUB-Q Every 2 Weeks 22  Yes Historical Provider   famotidine (PEPCID) 20 MG tablet Take 20 mg by mouth 2 (two) times daily.   Yes Historical Provider   fluticasone propionate (FLONASE) 50 mcg/actuation nasal spray 1 spray (50 mcg total) by Each Nostril route 2 (two) times daily. 21  Yes Lary Sweet,    fluticasone-salmeterol diskus inhaler 250-50 mcg Inhale 1 puff into the lungs 2 (two) times daily. Controller 21 Yes Donaldo Pena MD   furosemide (LASIX) 20 MG tablet TAKE 1 TABLET(20 MG) BY MOUTH EVERY DAY 22  Yes Donaldo Pena MD   gabapentin (NEURONTIN) 300 MG capsule TAKE 2 CAPSULES(600 MG) BY MOUTH TWICE DAILY 22  Yes Donaldo Pena MD   hydrOXYzine (ATARAX) 50 MG tablet Take 50 mg by mouth 4 (four) times daily as needed. 3/17/22  Yes Historical Provider   ibuprofen (ADVIL,MOTRIN) 600 MG tablet TAKE 1 TABLET(600 MG) BY MOUTH EVERY 8 HOURS AS NEEDED FOR PAIN OR BACK PAIN 22  Yes Donaldo Pena MD   lancets Misc To check BG two times daily, to use with insurance preferred meter 21  Yes Donaldo Pena MD   lisinopriL 10 MG tablet Take 1 tablet (10 mg total) by mouth once daily. 22  Yes Donaldo Pena MD   loratadine (CLARITIN) 10 mg tablet Take 1 tablet (10 mg total) by mouth once daily. 21 Yes Lary Sweet DO   metFORMIN (GLUCOPHAGE) 1000 MG tablet TAKE 1 TABLET(1000 MG) BY MOUTH TWICE DAILY WITH MEALS  12/26/21  Yes Donaldo Pena MD   metoprolol tartrate (LOPRESSOR) 50 MG tablet TAKE 1 TABLET(50 MG) BY MOUTH TWICE DAILY 12/26/21  Yes Donaldo Pena MD   OPTICHAMBER BIGG LG MASK Spcr Inhale into the lungs. 12/30/21  Yes Historical Provider   pantoprazole (PROTONIX) 40 MG tablet Take 1 tablet (40 mg total) by mouth once daily. 7/13/21  Yes Aiden Oleary MD   polyvinyl alcohol, artificial tears, (LIQUIFILM TEARS) 1.4 % ophthalmic solution Place 1 drop into both eyes 4 (four) times daily. 2/20/21  Yes Ifeoma Jacobs MD   pravastatin (PRAVACHOL) 40 MG tablet TAKE 1 TABLET(40 MG) BY MOUTH EVERY EVENING 12/26/21  Yes Donaldo Pena MD   senna (SENOKOT) 8.6 mg tablet Take 1 tablet by mouth 2 (two) times a day. 2/20/21  Yes Ifeoma Jacobs MD   TRUE METRIX GLUCOSE METER [facility] CHECK BLOOD SUGAR TWICE DAILY 11/4/21  Yes Donaldo Pena MD   blood-glucose meter kit To check BG two times daily, to use with insurance preferred meter 9/30/21 9/30/22  Donaldo Pena MD   divalproex (DEPAKOTE) 250 MG EC tablet Take 5 tablets (1,250 mg total) by mouth every evening. 4/25/22 4/25/23  Aniya Ramirez PA-C   divalproex (DEPAKOTE) 500 MG TbEC Take 1 tablet (500 mg total) by mouth once daily. PO QAM 4/26/22 4/26/23  Aniya Ramirez PA-C   doxycycline (VIBRAMYCIN) 100 MG Cap Take 1 capsule (100 mg total) by mouth every 12 (twelve) hours. for 9 days 4/25/22 5/4/22  Aniya Ramirez PA-C   enoxaparin (LOVENOX) 100 mg/mL Syrg Inject 1 mL (100 mg total) into the skin every 12 (twelve) hours. 4/18/22   Diego Ridley MD   EPITOL 200 mg tablet  2/26/21   Historical Provider   folic acid (FOLVITE) 1 MG tablet Take 1 tablet (1 mg total) by mouth once daily. 7/19/21 10/17/21  Donaldo Pena MD   hydrOXYzine pamoate (VISTARIL) 50 MG Cap Take 1 capsule (50 mg total) by mouth every 8 (eight) hours as needed (insomnia). 4/18/22   Diego Ridley MD   hydrOXYzine pamoate (VISTARIL) 50 MG Cap Take 1 capsule (50 mg total) by mouth 3  (three) times daily. 4/25/22 7/24/22  Aniya Ramirez PA-C   magnesium oxide (MAG-OX) 400 mg (241.3 mg magnesium) tablet Take 1 tablet (400 mg total) by mouth 2 (two) times daily. 4/25/22   Aniya Ramirez PA-C   metroNIDAZOLE (FLAGYL) 500 MG tablet Take 1 tablet (500 mg total) by mouth every 8 (eight) hours. for 9 days 4/25/22 5/4/22  Aniya Ramirez PA-C   multivitamin Tab Take 1 tablet by mouth once daily. 2/20/21   Ifeoma Jacobs MD   OLANZapine (ZYPREXA) injection Inject 10 mg into the muscle every 8 (eight) hours as needed for Agitation (non-redirectable psychotic / manic agitation). 4/25/22 4/25/23  Aniya Ramirez PA-C   risperiDONE (RISPERDAL M-TABS) 2 MG disintegrating tablet Take 1 tablet (2 mg total) by mouth once daily. 4/26/22 4/26/23  Aniya Ramirez PA-C   risperiDONE (RISPERDAL M-TABS) 3 MG disintegrating tablet Take 1 tablet (3 mg total) by mouth nightly. 4/25/22 4/25/23  Aniya Ramirez PA-C   diclofenac sodium (VOLTAREN) 1 % Gel Apply 2 g topically 4 (four) times daily as needed (Apply to painful area up to 4 times a day as needed for pain). Apply to painful area 4 times a day as needed for pain 10/1/21 4/18/22  Corie Iqbal NP   divalproex (DEPAKOTE) 500 MG TbEC Take 1 tablet (500 mg total) by mouth once daily. 4/19/22 4/25/22  Diego Ridley MD   divalproex (DEPAKOTE) 500 MG TbEC Take 2 tablets (1,000 mg total) by mouth every evening. 4/18/22 4/25/22  Diego Ridley MD   divalproex (DEPAKOTE) 500 MG TbEC Take 2 tablets (1,000 mg total) by mouth every evening. 4/25/22 4/25/22  Aniya Ramirez PA-C   divalproex (DEPAKOTE) 500 MG TbEC Take 1 tablet (500 mg total) by mouth once daily. PO QAM 4/26/22 4/25/22  Aniya Ramirez PA-C   nystatin (NYSTOP) powder APPLY TOPICALLY TO AXILLA AND BREAST FOLDS TWICE DAILY 9/30/21 4/18/22  Donaldo Pena MD   QUEtiapine (SEROQUEL) 200 MG Tab Take 1 tablet (200 mg total) by mouth before breakfast. 2/21/21 4/18/22  Ifeoma Jacobs MD   risperiDONE (RISPERDAL M-TABS) 2  MG disintegrating tablet Take 1 tablet (2 mg total) by mouth 2 (two) times daily. 4/18/22 4/25/22  Diego Ridley MD   sulfamethoxazole-trimethoprim 800-160mg (BACTRIM DS) 800-160 mg Tab Take 2 tablets by mouth 2 (two) times daily. for 10 days 4/18/22 4/25/22  Diego Ridley MD       Scheduled Meds:    aspirin  81 mg Oral Daily    divalproex  1,250 mg Oral QHS    divalproex  500 mg Oral Daily    famotidine  20 mg Oral Daily    ferrous sulfate  1 tablet Oral Daily    fluticasone furoate-vilanteroL  1 puff Inhalation Daily    gabapentin  300 mg Oral BID    hydrOXYzine pamoate  50 mg Oral TID    insulin detemir U-100  10 Units Subcutaneous QHS    magnesium oxide  400 mg Oral BID    metoprolol tartrate  50 mg Oral BID    metroNIDAZOLE  500 mg Oral Q8H    miconazole NITRATE 2 %   Topical (Top) BID    nicotine  1 patch Transdermal Daily    pravastatin  40 mg Oral QHS    risperiDONE  2 mg Oral Daily    risperiDONE  3 mg Oral Nightly    vancomycin (VANCOCIN) IVPB  1,250 mg Intravenous Q12H      PRN Meds: acetaminophen, albuterol-ipratropium, dextrose 10%, dextrose 10%, glucagon (human recombinant), glucose, glucose, HYDROcodone-acetaminophen, hydrocortisone, insulin aspart U-100, melatonin, naloxone, OLANZapine, ondansetron, senna-docusate 8.6-50 mg, sodium chloride 0.9%, DIPH,PERTUSS(ACELL),TET VACCINE (ADULT)(BOOSTRIX,ADACEL), Pharmacy to dose Vancomycin consult **AND** vancomycin - pharmacy to dose   Psychotherapeutics (From admission, onward)            Start     Stop Route Frequency Ordered    04/23/22 0024  OLANZapine injection 10 mg         -- IM Every 8 hours PRN 04/22/22 2324    04/20/22 0900  risperiDONE disintegrating tablet 2 mg         -- Oral Daily 04/19/22 1439    04/19/22 2100  risperiDONE disintegrating tablet 3 mg         -- Oral Nightly 04/19/22 1439          LABORATORY DATA   Recent Results (from the past 72 hour(s))   VANCOMYCIN, TROUGH    Collection Time: 04/22/22  3:06 PM   Result  Value Ref Range    Vancomycin-Trough 10.7 10.0 - 22.0 ug/mL   POCT glucose    Collection Time: 04/22/22  4:08 PM   Result Value Ref Range    POCT Glucose 333 (H) 70 - 110 mg/dL   POCT glucose    Collection Time: 04/22/22  9:35 PM   Result Value Ref Range    POCT Glucose 243 (H) 70 - 110 mg/dL   Valproic Acid    Collection Time: 04/23/22  3:21 AM   Result Value Ref Range    Valproic Acid Level 65.9 50.0 - 100.0 ug/mL   CBC auto differential    Collection Time: 04/23/22  3:21 AM   Result Value Ref Range    WBC 15.17 (H) 3.90 - 12.70 K/uL    RBC 3.33 (L) 4.00 - 5.40 M/uL    Hemoglobin 9.1 (L) 12.0 - 16.0 g/dL    Hematocrit 28.2 (L) 37.0 - 48.5 %    MCV 85 82 - 98 fL    MCH 27.3 27.0 - 31.0 pg    MCHC 32.3 32.0 - 36.0 g/dL    RDW 15.8 (H) 11.5 - 14.5 %    Platelets 751 (H) 150 - 450 K/uL    MPV 10.0 9.2 - 12.9 fL    Immature Granulocytes 1.5 (H) 0.0 - 0.5 %    Gran # (ANC) 7.8 (H) 1.8 - 7.7 K/uL    Immature Grans (Abs) 0.22 (H) 0.00 - 0.04 K/uL    Lymph # 4.6 1.0 - 4.8 K/uL    Mono # 2.0 (H) 0.3 - 1.0 K/uL    Eos # 0.4 0.0 - 0.5 K/uL    Baso # 0.08 0.00 - 0.20 K/uL    nRBC 0 0 /100 WBC    Gran % 51.6 38.0 - 73.0 %    Lymph % 30.5 18.0 - 48.0 %    Mono % 13.1 4.0 - 15.0 %    Eosinophil % 2.8 0.0 - 8.0 %    Basophil % 0.5 0.0 - 1.9 %    Platelet Estimate Increased (A)     Aniso Slight     Poik Slight     Poly Occasional     Hypo Occasional     Ovalocytes Occasional     Target Cells Occasional     Tear Drop Cells Occasional     Comfort Cells Occasional     Acanthocytes Present     Differential Method Automated    Comprehensive metabolic panel    Collection Time: 04/23/22  3:21 AM   Result Value Ref Range    Sodium 133 (L) 136 - 145 mmol/L    Potassium 5.2 (H) 3.5 - 5.1 mmol/L    Chloride 95 95 - 110 mmol/L    CO2 28 23 - 29 mmol/L    Glucose 252 (H) 70 - 110 mg/dL    BUN 26 (H) 6 - 20 mg/dL    Creatinine 0.8 0.5 - 1.4 mg/dL    Calcium 9.7 8.7 - 10.5 mg/dL    Total Protein 6.5 6.0 - 8.4 g/dL    Albumin 2.8 (L) 3.5 - 5.2 g/dL     Total Bilirubin 0.2 0.1 - 1.0 mg/dL    Alkaline Phosphatase 62 55 - 135 U/L    AST 8 (L) 10 - 40 U/L    ALT 13 10 - 44 U/L    Anion Gap 10 8 - 16 mmol/L    eGFR if African American >60 >60 mL/min/1.73 m^2    eGFR if non African American >60 >60 mL/min/1.73 m^2   POCT glucose    Collection Time: 04/23/22  4:49 AM   Result Value Ref Range    POCT Glucose 228 (H) 70 - 110 mg/dL   POCT glucose    Collection Time: 04/23/22 12:12 PM   Result Value Ref Range    POCT Glucose 225 (H) 70 - 110 mg/dL   POCT glucose    Collection Time: 04/23/22  3:40 PM   Result Value Ref Range    POCT Glucose 211 (H) 70 - 110 mg/dL   POCT glucose    Collection Time: 04/23/22  8:13 PM   Result Value Ref Range    POCT Glucose 270 (H) 70 - 110 mg/dL   Urinalysis, Reflex to Urine Culture Urine, Clean Catch    Collection Time: 04/23/22  9:44 PM    Specimen: Urine   Result Value Ref Range    Specimen UA Urine, Clean Catch     Color, UA Yellow Yellow, Straw, Tanya    Appearance, UA Clear Clear    pH, UA 6.0 5.0 - 8.0    Specific Gravity, UA 1.020 1.005 - 1.030    Protein, UA Trace (A) Negative    Glucose, UA 2+ (A) Negative    Ketones, UA Trace (A) Negative    Bilirubin (UA) Negative Negative    Occult Blood UA Negative Negative    Nitrite, UA Negative Negative    Urobilinogen, UA Negative <2.0 EU/dL    Leukocytes, UA 1+ (A) Negative   Urinalysis Microscopic    Collection Time: 04/23/22  9:44 PM   Result Value Ref Range    RBC, UA 1 0 - 4 /hpf    WBC, UA 11 (H) 0 - 5 /hpf    Bacteria Rare None-Occ /hpf    Squam Epithel, UA 3 /hpf    Microscopic Comment SEE COMMENT    VANCOMYCIN, TROUGH    Collection Time: 04/24/22  2:25 AM   Result Value Ref Range    Vancomycin-Trough 21.1 10.0 - 22.0 ug/mL   Renal Function Panel    Collection Time: 04/24/22  2:25 AM   Result Value Ref Range    Glucose 209 (H) 70 - 110 mg/dL    Sodium 134 (L) 136 - 145 mmol/L    Potassium 5.2 (H) 3.5 - 5.1 mmol/L    Chloride 97 95 - 110 mmol/L    CO2 27 23 - 29 mmol/L    BUN 19 6  - 20 mg/dL    Calcium 9.2 8.7 - 10.5 mg/dL    Creatinine 0.7 0.5 - 1.4 mg/dL    Albumin 2.8 (L) 3.5 - 5.2 g/dL    Phosphorus 4.5 2.7 - 4.5 mg/dL    eGFR if African American >60 >60 mL/min/1.73 m^2    eGFR if non African American >60 >60 mL/min/1.73 m^2    Anion Gap 10 8 - 16 mmol/L   POCT glucose    Collection Time: 04/24/22  7:32 AM   Result Value Ref Range    POCT Glucose 224 (H) 70 - 110 mg/dL   VANCOMYCIN, TROUGH    Collection Time: 04/24/22  1:16 PM   Result Value Ref Range    Vancomycin-Trough 13.0 10.0 - 22.0 ug/mL   POCT glucose    Collection Time: 04/24/22  8:06 PM   Result Value Ref Range    POCT Glucose 218 (H) 70 - 110 mg/dL   POCT glucose    Collection Time: 04/25/22  4:25 AM   Result Value Ref Range    POCT Glucose 203 (H) 70 - 110 mg/dL   CBC auto differential    Collection Time: 04/25/22  7:27 AM   Result Value Ref Range    WBC 15.92 (H) 3.90 - 12.70 K/uL    RBC 3.31 (L) 4.00 - 5.40 M/uL    Hemoglobin 9.2 (L) 12.0 - 16.0 g/dL    Hematocrit 27.8 (L) 37.0 - 48.5 %    MCV 84 82 - 98 fL    MCH 27.8 27.0 - 31.0 pg    MCHC 33.1 32.0 - 36.0 g/dL    RDW 15.5 (H) 11.5 - 14.5 %    Platelets 817 (H) 150 - 450 K/uL    MPV 10.3 9.2 - 12.9 fL    Immature Granulocytes 1.3 (H) 0.0 - 0.5 %    Gran # (ANC) 9.3 (H) 1.8 - 7.7 K/uL    Immature Grans (Abs) 0.21 (H) 0.00 - 0.04 K/uL    Lymph # 4.2 1.0 - 4.8 K/uL    Mono # 1.8 (H) 0.3 - 1.0 K/uL    Eos # 0.4 0.0 - 0.5 K/uL    Baso # 0.11 0.00 - 0.20 K/uL    nRBC 0 0 /100 WBC    Gran % 58.3 38.0 - 73.0 %    Lymph % 26.1 18.0 - 48.0 %    Mono % 11.3 4.0 - 15.0 %    Eosinophil % 2.3 0.0 - 8.0 %    Basophil % 0.7 0.0 - 1.9 %    Platelet Estimate Increased (A)     Aniso Slight     Poik Slight     Hypo Occasional     Ovalocytes Occasional     Comfort Cells Occasional     Differential Method Automated    Renal function panel    Collection Time: 04/25/22  7:27 AM   Result Value Ref Range    Glucose 223 (H) 70 - 110 mg/dL    Sodium 130 (L) 136 - 145 mmol/L    Potassium 5.0 3.5 - 5.1  "mmol/L    Chloride 93 (L) 95 - 110 mmol/L    CO2 28 23 - 29 mmol/L    BUN 15 6 - 20 mg/dL    Calcium 9.4 8.7 - 10.5 mg/dL    Creatinine 0.7 0.5 - 1.4 mg/dL    Albumin 2.8 (L) 3.5 - 5.2 g/dL    Phosphorus 4.3 2.7 - 4.5 mg/dL    eGFR if African American >60 >60 mL/min/1.73 m^2    eGFR if non African American >60 >60 mL/min/1.73 m^2    Anion Gap 9 8 - 16 mmol/L   Magnesium    Collection Time: 04/25/22  7:27 AM   Result Value Ref Range    Magnesium 1.7 1.6 - 2.6 mg/dL   POCT glucose    Collection Time: 04/25/22 11:29 AM   Result Value Ref Range    POCT Glucose 220 (H) 70 - 110 mg/dL      Lab Results   Component Value Date    VALPROATE 65.9 04/23/2022    CBMZ 3.6 (L) 01/26/2021         EXAMINATION    VITALS   Vitals:    04/25/22 0500 04/25/22 0805 04/25/22 0900 04/25/22 1133   BP:    135/66   Patient Position:    Lying   Pulse:  74 73 73   Resp:  18 18    Temp:   97.2 °F (36.2 °C) 96.5 °F (35.8 °C)   TempSrc:    Axillary   SpO2:  98% 96% 97%   Weight: 113.5 kg (250 lb 3.6 oz)      Height:            CONSTITUTIONAL  General Appearance: NAD, unremarkable, age appropriate, disheveled, seated in chair, more calm initially, overweight     MUSCULOSKELETAL  Muscle Strength and Tone: WNL  Abnormal Involuntary Movements: none observed   Gait and Station: WNL; non-ataxic      PSYCHIATRIC   Behavior/Cooperation:  more cooperative, less restless and fidgety, eye contact intermittent   Speech:  normal tone, normal pitch, loud volume, rapid/pressured   Language: grossly intact, able to name, able to repeat with spontaneous speech  Mood: "I'm fine"  Affect:  Labile ; Bizarre (see subjective above)   Associations: intermittent TERRI  Thought Process: Linear with intermittent and progressive tangential / circumferential / TERRI (see subjective above)  Thought Content: + paranoia (improving) ; denies SI, HI, AH, VH, TH, delusions (no RIS observed)  Sensorium:  Awake  Alert and Oriented: to person, place, situation, month of year, " year  Memory: 3/3 immediate, 2/3 at 5 minutes               Recent: Intact; able to report recent events              Remote: Intact; Named 4/4 past presidents   Attention/concentration: Fair.  Requiring frequent redirection. Appropriate for age/education. Able to spell w-o-r-l-d & d-l-r-o-w.   Similarities: Intact (difference between apple and orange?)  Abstract reasoning: Limited   Insight: Limited  Judgment: Limited     CAM ICU Delirium Assessment - NEGATIVE        Is the patient aware of the biomedical complications associated with substance abuse and mental illness? yes           MEDICAL DECISION MAKING     ASSESSMENT      Bipolar I Disorder (currently severe with mixed s/s of depression and bibi)   Unspecified Insomnia   Hx of ADHD      RECOMMENDATIONS       - Patient is now in a Judicial Commitment Petition Filed Status (filed on 04/21/2022) due to continued grave disability 2/2 mental illness at this time (I am reaching back out to the hospital  today to request a signed copy of the CHANCE petition filed on 04/21/2022).       - Once medically cleared / stable, seek transfer back to [facility] (or another inpatient psychiatric facility; patient may need Med-Psych placement) for continued mental health treatment / stabilization.     - Increase Risperdal to 3 mg PO BID and continue Depakote ER 500 mg PO QAM and 1250 mg PO QHS for Bipolar I Disorder and insomnia (discussed risks/benefits/alt vs no treatment with patient).     - Continue Vistaril 50 mg PO TID for anxiety and/or insomnia (discussed risks/benefits/alt vs no treatment with patient).     - HOLD previously outpt prescribed Vyvanse (discussed risks/benefits/alt vs no treatment with patient).     - Can use Zyprexa 10 mg PO/IM q8 hours PRN for non-redirectable psychotic / manic agitation (discussed risks/benefits/alt vs no treatment with patient).     - Psychotherapy was again performed with patient as noted above with a focus on improving mood  / bibi / thought process.     - Patient's sister and step-daughter are both working to encourage the patient to comply with recommended treatment at this time.         - Patient's most recent labs, imaging, and EKG were reviewed again today ; VPA level on 04/23/2022 was 65.9 ; monitor sodium & potassium status     - Continue suicide / violence precautions and continue to monitor the patient with a sitter while on a PEC / CEC / CHANCE filed status.       - Thank you for this consult ; our team will continue to follow patient         Total time spent with patient and/or managing/coordinating patient's care today (excluding the time spent on psychotherapy): 41 minutes   Time spent on psychotherapy today (as noted above): 18 minutes   Total time for encounter today including psychotherapy: 59 minutes      More than 50% of the time was spent counseling/coordinating care.     Consulting clinician was informed of the encounter and consult note.      STAFF:  Magdiel Del Rela MD  [facility]  4/25/2022

## 2022-04-25 NOTE — ASSESSMENT & PLAN NOTE
Bipolar 1 disorder    Per Psychiatry note,   - Patient is now in a Judicial Commitment Petition Filed Status (filed on 04/21/2022) due to continued grave disability 2/2 mental illness at this time (will have court paperwork scanned into chart once received from the hospital ).    - Once medically cleared / stable, seek transfer back to [facility] (or another inpatient psychiatric facility; patient may need Med-Psych placement) for continued mental health treatment / stabilization.  - Continue Risperdal 2 mg PO QAM & 3 mg PO QHS and Depakote ER 500 mg PO QAM and 1250 mg PO QHS for Bipolar I Disorder and insomnia   - Continue Vistaril 50 mg PO TID (change to scheduled) for anxiety and/or insomnia   - HOLD previously outpt prescribed Vyvanse  - Can use Zyprexa 10 mg PO/IM q8 hours PRN for non-redirectable psychotic / manic agitation   - Get repeat trough VPA level tomorrow (Saturday) AM;  - Continue suicide / violence precautions and continue to monitor the patient with a sitter while on a PEC / CEC / CHANCE filed status.   - plan to return to [facility] of following clinical stabilization, likely early this week

## 2022-04-25 NOTE — NURSING
Pt removed her IV and is refusing re-insertion; pt has vancomycin scheduled at 0200; GABI Pillai notified

## 2022-04-25 NOTE — ASSESSMENT & PLAN NOTE
B/l foot ulcers  - Podiatry consulted,  - Xray in feet bilaterally, findings consistent with Charcot joint of left foot, no acute abnormality   - MRI ordered edema noted as well as charcot changes of left foot without evidence of osteomyelitis  - E ulcer debrided with deep cultures taken.   - Site dressed with xeroform and kerlix. Nursing orders in for daily dressing changes  - She is NWB to [facility] due to ulcer and history of charcot foot.  - Will continue to follow  - Cont clindamycin - culture growing Staph aureus--sensitive to clinda, switched   - Cultures grew out MRSA and anerobic bacteria; patient is septic today and rising WBC; switched clinda to bactrim and adding flagyl;       - Leukocytosis improving today, per Heme/onc patient baseline around 15 given asplenia  - Continue vancomycin and flagyl  - Follow up on ID and podiatry recs

## 2022-04-25 NOTE — PLAN OF CARE
VN note: VN completed AVS and attachments and notified bedside nurse, Norah. Will cont to be available and intervene prn.

## 2022-04-25 NOTE — ASSESSMENT & PLAN NOTE
Developed during admission, increased to 5.4 and to 5.7    - Given [facility]  - Nephrology folowing  - Renal US completed to rule out obstruction

## 2022-04-25 NOTE — PLAN OF CARE
[facility]    FACILITY TRANSFER ORDERS      Patient Name: Audrey Natarajan  YOB: 1970    PCP: Donalod Pena MD   PCP Address: [facility]IL[facility]  PCP Phone Number: [phone]  PCP Fax: [phone]    Encounter Date: 04/25/2022    Admit to: Inpatient psych facility    Vital Signs:  Routine    Diagnoses:   Active Hospital Problems    Diagnosis  POA    *Diabetic foot ulcer associated with type 2 diabetes mellitus [E11.621, L97.509]  Yes    Hyperkalemia [E87.5]  No    Lymphadenopathy, inguinal [R59.0]  Unknown    ODESSA (acute kidney injury) [N17.9]  No    Panniculitis [M79.3]  Unknown    Sepsis due to methicillin resistant Staphylococcus aureus (MRSA) [A41.02]  Yes    Acute deep vein thrombosis (DVT) of both lower extremities [I82.403]  Yes    Anemia [D64.9]  Yes    SHAWN (obstructive sleep apnea) [G47.33]  Yes     -diagnosed many years ago.  Lost pap device from evacuation.  Record of prior sleep study not available.  Will set up with hsat.  Possible apap after sleep study      Psychosis [F29]  Yes    Cellulitis of lower extremity [L03.119]  Yes    Thrombocytosis [D75.839]  Yes    Bipolar 1 disorder [F31.9]  Yes     Chronic    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 [E11.40]  Yes    COPD (chronic obstructive pulmonary disease) [J44.9]  Yes     Chronic    Essential hypertension [I10]  Yes     Chronic      Resolved Hospital Problems   No resolved problems to display.       Allergies:  Review of patient's allergies indicates:   Allergen Reactions    Morphine Other (See Comments)     Patient had a psychotic episode after taking Morphine  Agitation, hallucinations    Penicillins Anaphylaxis     itching    Januvia [sitagliptin] Hives       Diet: diabetic diet: 2000 calorie    Activities:  - Protected WB RLE in surgical show. NWB LLE when possible due to ulcer, Charcot and heel WB for transfers    Nursing:See wound care  - Contact precautions  for MRSA infection until 5/4    Labs: None     CONSULTS:    Physical Therapy to evaluate and treat.  and Occupational Therapy to evaluate and treat.    MISCELLANEOUS CARE:  Diabetes Care:   Fingerstick blood sugar AC and HS and Report CBG < 60 or > 350 to physician.    WOUND CARE ORDERS  Yes: Foot Ulcer:  Location: Bilateral feet    Dressing changes 3x per week: cleanse wounds on both feet with saline, apply hydrofera blue, wrap both feet with cast padding followed by coban      Medications: Review discharge medications with patient and family and provide education.      Current Discharge Medication List      START taking these medications    Details   !! divalproex (DEPAKOTE) 250 MG EC tablet Take 5 tablets (1,250 mg total) by mouth every evening.  Qty: 150 tablet, Refills: 11      !! divalproex (DEPAKOTE) 500 MG TbEC Take 1 tablet (500 mg total) by mouth once daily. PO QAM  Qty: 30 tablet, Refills: 11      doxycycline (VIBRAMYCIN) 100 MG Cap Take 1 capsule (100 mg total) by mouth every 12 (twelve) hours. for 9 days  Qty: 18 capsule, Refills: 0   End date 5/4/2022   enoxaparin (LOVENOX) 100 mg/mL Syrg Inject 1 mL (100 mg total) into the skin every 12 (twelve) hours.      !! hydrOXYzine pamoate (VISTARIL) 50 MG Cap Take 1 capsule (50 mg total) by mouth every 8 (eight) hours as needed (insomnia).      !! hydrOXYzine pamoate (VISTARIL) 50 MG Cap Take 1 capsule (50 mg total) by mouth 3 (three) times daily.  Qty: 90 capsule, Refills: 2      magnesium oxide (MAG-OX) 400 mg (241.3 mg magnesium) tablet Take 1 tablet (400 mg total) by mouth 2 (two) times daily.  Qty: 30 tablet, Refills: 3      metroNIDAZOLE (FLAGYL) 500 MG tablet Take 1 tablet (500 mg total) by mouth every 8 (eight) hours. for 9 days  Qty: 27 tablet, Refills: 0   End date 5/4/2022   OLANZapine (ZYPREXA) injection Inject 10 mg into the muscle every 8 (eight) hours as needed for Agitation (non-redirectable psychotic / manic agitation).  Qty: 10 each, Refills:  3      !! risperiDONE (RISPERDAL M-TABS) 3 MG disintegrating tablet Take 1 tablet (2 mg total) by mouth once daily - QAM  Qty: 30 tablet, Refills: 11      !! risperiDONE (RISPERDAL M-TABS) 3 MG disintegrating tablet Take 1 tablet (3 mg total) by mouth nightly.  Qty: 30 tablet, Refills: 11       !! - Potential duplicate medications found. Please discuss with provider.      CONTINUE these medications which have NOT CHANGED    Details   acetaminophen (TYLENOL) 500 MG tablet Take 2 tablets (1,000 mg total) by mouth every 6 (six) hours as needed for Pain.  Qty: 30 tablet, Refills: 0      albuterol (PROVENTIL/VENTOLIN HFA) 90 mcg/actuation inhaler Inhale 2 puffs into the lungs every 6 (six) hours as needed for Wheezing. Use with spacer  Dispense with 1 spacer  Qty: 18 g, Refills: 0      albuterol-ipratropium (DUO-NEB) 2.5 mg-0.5 mg/3 mL nebulizer solution Take 3 mLs by nebulization every 6 (six) hours as needed for Wheezing or Shortness of Breath. Rescue  Qty: 1 Box, Refills: 0    Associated Diagnoses: Chronic obstructive pulmonary disease, unspecified COPD type      aspirin 81 MG Chew Take 1 tablet (81 mg total) by mouth once daily.  Qty: 30 tablet, Refills: 11      blood sugar diagnostic Strp To check BG two  times daily, to use with insurance preferred meter  Qty: 100 each, Refills: 1    Associated Diagnoses: Type 2 diabetes mellitus with diabetic polyneuropathy, without long-term current use of insulin      !! blood-glucose meter kit To check BG once daily, to use with insurance preferred meter  Qty: 1 each, Refills: 0    Comments: Any brand covered by insurance  Associated Diagnoses: Type 2 diabetes mellitus without complication, without long-term current use of insulin      dicyclomine (BENTYL) 20 mg tablet Take 1 tablet (20 mg total) by mouth every 6 (six) hours.  Qty: 30 tablet, Refills: 0    Associated Diagnoses: Acute gastroenteritis      DUPIXENT  mg/2 mL PnIj SMARTSI Milligram(s) SUB-Q Every 2  Weeks      famotidine (PEPCID) 20 MG tablet Take 20 mg by mouth 2 (two) times daily.      fluticasone propionate (FLONASE) 50 mcg/actuation nasal spray 1 spray (50 mcg total) by Each Nostril route 2 (two) times daily.  Qty: 16 g, Refills: 0      fluticasone-salmeterol diskus inhaler 250-50 mcg Inhale 1 puff into the lungs 2 (two) times daily. Controller  Qty: 60 each, Refills: 2    Associated Diagnoses: Chronic obstructive pulmonary disease, unspecified COPD type      furosemide (LASIX) 20 MG tablet TAKE 1 TABLET(20 MG) BY MOUTH EVERY DAY  Qty: 90 tablet, Refills: 1      gabapentin (NEURONTIN) 300 MG capsule TAKE 2 CAPSULES(600 MG) BY MOUTH TWICE DAILY  Qty: 120 capsule, Refills: 2      hydrOXYzine (ATARAX) 50 MG tablet Take 50 mg by mouth 4 (four) times daily as needed.      ibuprofen (ADVIL,MOTRIN) 600 MG tablet TAKE 1 TABLET(600 MG) BY MOUTH EVERY 8 HOURS AS NEEDED FOR PAIN OR BACK PAIN  Qty: 90 tablet, Refills: 0      lancets Misc To check BG two times daily, to use with insurance preferred meter  Qty: 100 each, Refills: 1    Associated Diagnoses: Type 2 diabetes mellitus with diabetic polyneuropathy, without long-term current use of insulin      lisinopriL 10 MG tablet Take 1 tablet (10 mg total) by mouth once daily.  Qty: 90 tablet, Refills: 3    Associated Diagnoses: Essential hypertension; Type 2 diabetes mellitus with diabetic polyneuropathy, without long-term current use of insulin      loratadine (CLARITIN) 10 mg tablet Take 1 tablet (10 mg total) by mouth once daily.  Qty: 60 tablet, Refills: 0      metFORMIN (GLUCOPHAGE) 1000 MG tablet TAKE 1 TABLET(1000 MG) BY MOUTH TWICE DAILY WITH MEALS  Qty: 180 tablet, Refills: 2    Associated Diagnoses: Type 2 diabetes mellitus with diabetic polyneuropathy, without long-term current use of insulin      metoprolol tartrate (LOPRESSOR) 50 MG tablet TAKE 1 TABLET(50 MG) BY MOUTH TWICE DAILY  Qty: 180 tablet, Refills: 2    Associated Diagnoses: Essential hypertension       OPTICHAMBER BIGG LG MASK Spcr Inhale into the lungs.      pantoprazole (PROTONIX) 40 MG tablet Take 1 tablet (40 mg total) by mouth once daily.  Qty: 30 tablet, Refills: 0      polyvinyl alcohol, artificial tears, (LIQUIFILM TEARS) 1.4 % ophthalmic solution Place 1 drop into both eyes 4 (four) times daily.  Qty: 15 mL, Refills: 2      pravastatin (PRAVACHOL) 40 MG tablet TAKE 1 TABLET(40 MG) BY MOUTH EVERY EVENING  Qty: 90 tablet, Refills: 3    Associated Diagnoses: Type 2 diabetes mellitus with diabetic polyneuropathy, without long-term current use of insulin      senna (SENOKOT) 8.6 mg tablet Take 1 tablet by mouth 2 (two) times a day.  Qty: 60 tablet, Refills: 2      TRUE METRIX GLUCOSE METER [facility] CHECK BLOOD SUGAR TWICE DAILY  Qty: 1 each, Refills: 0      !! blood-glucose meter kit To check BG two times daily, to use with insurance preferred meter  Qty: 1 each, Refills: 0    Associated Diagnoses: Type 2 diabetes mellitus with diabetic polyneuropathy, without long-term current use of insulin      EPITOL 200 mg tablet       folic acid (FOLVITE) 1 MG tablet Take 1 tablet (1 mg total) by mouth once daily.  Qty: 30 tablet, Refills: 2      multivitamin Tab Take 1 tablet by mouth once daily.  Qty: 30 tablet, Refills: 2       !! - Potential duplicate medications found. Please discuss with provider.      STOP taking these medications       methocarbamoL (ROBAXIN) 500 MG Tab Comments:   Reason for Stopping:         risperiDONE (RISPERDAL) 4 MG tablet Comments:   Reason for Stopping:         VYVANSE 40 mg Cap Comments:   Reason for Stopping:         albuterol sulfate 2.5 mg/0.5 mL Nebu Comments:   Reason for Stopping:         ammonium lactate (LAC-HYDRIN) 12 % lotion Comments:   Reason for Stopping:         diclofenac sodium (VOLTAREN) 1 % Gel Comments:   Reason for Stopping:         divalproex ER (DEPAKOTE) 500 MG Tb24 Comments:   Reason for Stopping:         haloperidoL (HALDOL) 10 MG tablet Comments:   Reason  for Stopping:         metronidazole 1% (METROGEL) 1 % Gel Comments:   Reason for Stopping:         mupirocin (BACTROBAN) 2 % ointment Comments:   Reason for Stopping:         nystatin (NYSTOP) powder Comments:   Reason for Stopping:         QUEtiapine (SEROQUEL) 200 MG Tab Comments:   Reason for Stopping:         triamcinolone acetonide 0.1% (KENALOG) 0.1 % ointment Comments:   Reason for Stopping:         white petrolatum 86.5 % Oint Comments:   Reason for Stopping:                Current Facility-Administered Medications   Medication Dose Route Frequency Provider Last Rate Last Admin    acetaminophen tablet 650 mg  650 mg Oral Q4H PRN Lizeth Briseno NP   650 mg at 04/25/22 0829    albuterol-ipratropium 2.5 mg-0.5 mg/3 mL nebulizer solution 3 mL  3 mL Nebulization Q4H PRN Lizeth Briseno NP        aspirin chewable tablet 81 mg  81 mg Oral Daily Lizeth Briseno NP   81 mg at 04/25/22 0831    dextrose 10% bolus 125 mL  12.5 g Intravenous PRN Lizeth Briseno NP        dextrose 10% bolus 250 mL  25 g Intravenous PRN Lizeth Briseno NP        divalproex EC tablet 1,250 mg  1,250 mg Oral QHS Magdiel Del Real MD   1,250 mg at 04/24/22 2121    divalproex EC tablet 500 mg  500 mg Oral Daily Magdiel Del Real MD   500 mg at 04/25/22 0831    famotidine tablet 20 mg  20 mg Oral Daily Diego Ridley MD   20 mg at 04/25/22 0831    ferrous sulfate tablet 1 each  1 tablet Oral Daily Aniya Ramirez PA-C   1 each at 04/25/22 0831    fluticasone furoate-vilanteroL 100-25 mcg/dose diskus inhaler 1 puff  1 puff Inhalation Daily Lizeth Briseno NP   1 puff at 04/25/22 0805    gabapentin capsule 300 mg  300 mg Oral BID Lizeth Briseno NP   300 mg at 04/25/22 0830    glucagon (human recombinant) injection 1 mg  1 mg Intramuscular PRN Lizeth Briseno NP        glucose chewable tablet 16 g  16 g Oral PRN Lizeth Briseno NP        glucose chewable tablet 24 g  24 g Oral  PRN Lizeth Briseno NP        HYDROcodone-acetaminophen  mg per tablet 1 tablet  1 tablet Oral Q12H PRN Aparna Crow NP   1 tablet at 04/24/22 2117    hydrocortisone 2.5 % rectal cream   Rectal BID PRN GIOVANNY Ott, ANP   Given at 04/16/22 0039    hydrOXYzine pamoate capsule 50 mg  50 mg Oral TID Magdiel Del Real MD   50 mg at 04/25/22 0830    insulin aspart U-100 pen 1-10 Units  1-10 Units Subcutaneous QID (AC + HS) PRN Aniya Ramirez PA-C   4 Units at 04/25/22 1140    insulin detemir U-100 pen 10 Units  10 Units Subcutaneous QHS Aniyashailesh Ramirez PA-C   10 Units at 04/24/22 2118    magnesium oxide tablet 400 mg  400 mg Oral BID Madhuri Fong MD   400 mg at 04/25/22 0831    melatonin tablet 6 mg  6 mg Oral Nightly PRN Lizeth Briseno NP   6 mg at 04/20/22 2104    metoprolol tartrate (LOPRESSOR) tablet 50 mg  50 mg Oral BID Aniya Ramirez PA-C   50 mg at 04/25/22 0831    metroNIDAZOLE tablet 500 mg  500 mg Oral Q8H Diego Ridley MD   500 mg at 04/25/22 0506    miconazole NITRATE 2 % top powder   Topical (Top) BID Aniyashailesh Ramirez PA-C   Given at 04/25/22 0832    naloxone 0.4 mg/mL injection 0.02 mg  0.02 mg Intravenous PRN Lizeth Briseno NP        nicotine 21 mg/24 hr 1 patch  1 patch Transdermal Daily Lorena Ferguson MD   1 patch at 04/23/22 0914    OLANZapine injection 10 mg  10 mg Intramuscular Q8H PRN Nydia Hutchinson NP   10 mg at 04/23/22 0526    ondansetron injection 4 mg  4 mg Intravenous Q8H PRN Lizeth Briseno NP   4 mg at 04/20/22 1742    pravastatin tablet 40 mg  40 mg Oral QHS Lizeth Briseno NP   40 mg at 04/24/22 2122    risperiDONE disintegrating tablet 2 mg  2 mg Oral Daily Magdiel Del Real MD   2 mg at 04/25/22 0831    risperiDONE disintegrating tablet 3 mg  3 mg Oral Nightly Magdiel Del Real MD   3 mg at 04/24/22 2120    senna-docusate 8.6-50 mg per tablet 1 tablet  1 tablet Oral Daily PRN Aniya Ramirez PA-C   1 tablet at  04/15/22 0604    sodium chloride 0.9% flush 10 mL  10 mL Intravenous Q12H PRN Lizeth Briseno NP        Tdap (BOOSTRIX) vaccine injection 0.5 mL  0.5 mL Intramuscular Prior to discharge Aniya Ramirze PA-C        vancomycin - pharmacy to dose   Intravenous pharmacy to manage frequency Diego Ridley MD        vancomycin 1.25 g in dextrose 5% 250 mL IVPB (ready to mix)  1,250 mg Intravenous Q12H Terry Mc MD               _________________________________  Aniya Ramirez PA-C  04/25/2022

## 2022-04-26 VITALS
BODY MASS INDEX: 41.91 KG/M2 | DIASTOLIC BLOOD PRESSURE: 64 MMHG | HEART RATE: 77 BPM | SYSTOLIC BLOOD PRESSURE: 132 MMHG | HEIGHT: 65 IN | TEMPERATURE: 98 F | OXYGEN SATURATION: 98 % | WEIGHT: 251.56 LBS | RESPIRATION RATE: 18 BRPM

## 2022-04-26 LAB
BASOPHILS # BLD AUTO: 0.11 K/UL (ref 0–0.2)
BASOPHILS NFR BLD: 0.8 % (ref 0–1.9)
DIFFERENTIAL METHOD: ABNORMAL
EOSINOPHIL # BLD AUTO: 0.4 K/UL (ref 0–0.5)
EOSINOPHIL NFR BLD: 2.8 % (ref 0–8)
ERYTHROCYTE [DISTWIDTH] IN BLOOD BY AUTOMATED COUNT: 15.9 % (ref 11.5–14.5)
HCT VFR BLD AUTO: 30.8 % (ref 37–48.5)
HGB BLD-MCNC: 9.7 G/DL (ref 12–16)
IMM GRANULOCYTES # BLD AUTO: 0.15 K/UL (ref 0–0.04)
IMM GRANULOCYTES NFR BLD AUTO: 1.1 % (ref 0–0.5)
LYMPHOCYTES # BLD AUTO: 3.7 K/UL (ref 1–4.8)
LYMPHOCYTES NFR BLD: 28.2 % (ref 18–48)
MCH RBC QN AUTO: 27.4 PG (ref 27–31)
MCHC RBC AUTO-ENTMCNC: 31.5 G/DL (ref 32–36)
MCV RBC AUTO: 87 FL (ref 82–98)
MONOCYTES # BLD AUTO: 1.4 K/UL (ref 0.3–1)
MONOCYTES NFR BLD: 10.7 % (ref 4–15)
NEUTROPHILS # BLD AUTO: 7.4 K/UL (ref 1.8–7.7)
NEUTROPHILS NFR BLD: 56.4 % (ref 38–73)
NRBC BLD-RTO: 0 /100 WBC
PLATELET # BLD AUTO: 618 K/UL (ref 150–450)
PMV BLD AUTO: 10.7 FL (ref 9.2–12.9)
POCT GLUCOSE: 142 MG/DL (ref 70–110)
POCT GLUCOSE: 173 MG/DL (ref 70–110)
POCT GLUCOSE: 213 MG/DL (ref 70–110)
RBC # BLD AUTO: 3.54 M/UL (ref 4–5.4)
WBC # BLD AUTO: 13.21 K/UL (ref 3.9–12.7)

## 2022-04-26 PROCEDURE — 36415 COLL VENOUS BLD VENIPUNCTURE: CPT

## 2022-04-26 PROCEDURE — 99233 SBSQ HOSP IP/OBS HIGH 50: CPT | Mod: AF,HB,, | Performed by: PSYCHIATRY & NEUROLOGY

## 2022-04-26 PROCEDURE — 25000003 PHARM REV CODE 250: Performed by: HOSPITALIST

## 2022-04-26 PROCEDURE — 94640 AIRWAY INHALATION TREATMENT: CPT

## 2022-04-26 PROCEDURE — 94761 N-INVAS EAR/PLS OXIMETRY MLT: CPT

## 2022-04-26 PROCEDURE — S4991 NICOTINE PATCH NONLEGEND: HCPCS | Performed by: HOSPITALIST

## 2022-04-26 PROCEDURE — 25000003 PHARM REV CODE 250: Performed by: PSYCHIATRY & NEUROLOGY

## 2022-04-26 PROCEDURE — 85025 COMPLETE CBC W/AUTO DIFF WBC: CPT

## 2022-04-26 PROCEDURE — 25000003 PHARM REV CODE 250: Performed by: NURSE PRACTITIONER

## 2022-04-26 PROCEDURE — 90833 PSYTX W PT W E/M 30 MIN: CPT | Mod: AF,HB,, | Performed by: PSYCHIATRY & NEUROLOGY

## 2022-04-26 PROCEDURE — 90833 PR PSYCHOTHERAPY W/PATIENT W/E&M, 30 MIN (ADD ON): ICD-10-PCS | Mod: AF,HB,, | Performed by: PSYCHIATRY & NEUROLOGY

## 2022-04-26 PROCEDURE — 25000003 PHARM REV CODE 250

## 2022-04-26 PROCEDURE — 99233 PR SUBSEQUENT HOSPITAL CARE,LEVL III: ICD-10-PCS | Mod: AF,HB,, | Performed by: PSYCHIATRY & NEUROLOGY

## 2022-04-26 PROCEDURE — 25000003 PHARM REV CODE 250: Performed by: STUDENT IN AN ORGANIZED HEALTH CARE EDUCATION/TRAINING PROGRAM

## 2022-04-26 RX ORDER — HYDROXYZINE PAMOATE 50 MG/1
50 CAPSULE ORAL 3 TIMES DAILY
Qty: 90 CAPSULE | Refills: 2 | Status: SHIPPED | OUTPATIENT
Start: 2022-04-26 | End: 2022-07-25

## 2022-04-26 RX ORDER — OLANZAPINE 10 MG/1
10 TABLET ORAL EVERY 8 HOURS PRN
Qty: 30 TABLET | Refills: 11 | Status: ON HOLD | OUTPATIENT
Start: 2022-04-26 | End: 2022-12-23

## 2022-04-26 RX ORDER — DOXYCYCLINE 100 MG/1
100 CAPSULE ORAL EVERY 12 HOURS
Qty: 16 CAPSULE | Refills: 0 | Status: ON HOLD | OUTPATIENT
Start: 2022-04-26 | End: 2022-05-13 | Stop reason: HOSPADM

## 2022-04-26 RX ORDER — DIVALPROEX SODIUM 500 MG/1
500 TABLET, DELAYED RELEASE ORAL DAILY
Qty: 30 TABLET | Refills: 11 | Status: SHIPPED | OUTPATIENT
Start: 2022-04-26 | End: 2023-02-06 | Stop reason: SDUPTHER

## 2022-04-26 RX ORDER — METRONIDAZOLE 500 MG/1
500 TABLET ORAL EVERY 8 HOURS
Qty: 24 TABLET | Refills: 0 | Status: ON HOLD | OUTPATIENT
Start: 2022-04-26 | End: 2022-05-13 | Stop reason: HOSPADM

## 2022-04-26 RX ORDER — RISPERIDONE 3 MG/1
3 TABLET, ORALLY DISINTEGRATING ORAL 2 TIMES DAILY
Qty: 60 TABLET | Refills: 11 | Status: SHIPPED | OUTPATIENT
Start: 2022-04-26 | End: 2023-02-06 | Stop reason: SDUPTHER

## 2022-04-26 RX ORDER — DOXYCYCLINE HYCLATE 100 MG
100 TABLET ORAL EVERY 12 HOURS
Status: DISCONTINUED | OUTPATIENT
Start: 2022-04-26 | End: 2022-04-26 | Stop reason: HOSPADM

## 2022-04-26 RX ORDER — LANOLIN ALCOHOL/MO/W.PET/CERES
400 CREAM (GRAM) TOPICAL 2 TIMES DAILY
Qty: 30 TABLET | Refills: 3 | Status: ON HOLD | OUTPATIENT
Start: 2022-04-26 | End: 2022-05-13 | Stop reason: HOSPADM

## 2022-04-26 RX ORDER — DIVALPROEX SODIUM 250 MG/1
1250 TABLET, DELAYED RELEASE ORAL NIGHTLY
Qty: 150 TABLET | Refills: 11 | Status: SHIPPED | OUTPATIENT
Start: 2022-04-26 | End: 2023-01-09

## 2022-04-26 RX ADMIN — FLUTICASONE FUROATE AND VILANTEROL TRIFENATATE 1 PUFF: 100; 25 POWDER RESPIRATORY (INHALATION) at 08:04

## 2022-04-26 RX ADMIN — Medication 400 MG: at 08:04

## 2022-04-26 RX ADMIN — METRONIDAZOLE 500 MG: 500 TABLET ORAL at 01:04

## 2022-04-26 RX ADMIN — INSULIN ASPART 4 UNITS: 100 INJECTION, SOLUTION INTRAVENOUS; SUBCUTANEOUS at 08:04

## 2022-04-26 RX ADMIN — FERROUS SULFATE TAB EC 325 MG (65 MG FE EQUIVALENT) 1 EACH: 325 (65 FE) TABLET DELAYED RESPONSE at 08:04

## 2022-04-26 RX ADMIN — MICONAZOLE NITRATE 2 % TOPICAL POWDER: at 08:04

## 2022-04-26 RX ADMIN — ACETAMINOPHEN 650 MG: 325 TABLET ORAL at 08:04

## 2022-04-26 RX ADMIN — NICOTINE 1 PATCH: 21 PATCH, EXTENDED RELEASE TRANSDERMAL at 08:04

## 2022-04-26 RX ADMIN — FAMOTIDINE 20 MG: 20 TABLET ORAL at 08:04

## 2022-04-26 RX ADMIN — ASPIRIN 81 MG CHEWABLE TABLET 81 MG: 81 TABLET CHEWABLE at 08:04

## 2022-04-26 RX ADMIN — ACETAMINOPHEN 650 MG: 325 TABLET ORAL at 05:04

## 2022-04-26 RX ADMIN — APIXABAN 5 MG: 5 TABLET, FILM COATED ORAL at 01:04

## 2022-04-26 RX ADMIN — INSULIN ASPART 2 UNITS: 100 INJECTION, SOLUTION INTRAVENOUS; SUBCUTANEOUS at 12:04

## 2022-04-26 RX ADMIN — HYDROXYZINE PAMOATE 50 MG: 25 CAPSULE ORAL at 05:04

## 2022-04-26 RX ADMIN — METRONIDAZOLE 500 MG: 500 TABLET ORAL at 05:04

## 2022-04-26 RX ADMIN — RISPERIDONE 3 MG: 1 TABLET, ORALLY DISINTEGRATING ORAL at 08:04

## 2022-04-26 RX ADMIN — HYDROXYZINE PAMOATE 50 MG: 25 CAPSULE ORAL at 08:04

## 2022-04-26 RX ADMIN — METOPROLOL TARTRATE 50 MG: 50 TABLET, FILM COATED ORAL at 08:04

## 2022-04-26 RX ADMIN — DIVALPROEX SODIUM 500 MG: 250 TABLET, DELAYED RELEASE ORAL at 08:04

## 2022-04-26 RX ADMIN — DOXYCYCLINE HYCLATE 100 MG: 100 TABLET, COATED ORAL at 09:04

## 2022-04-26 RX ADMIN — GABAPENTIN 300 MG: 300 CAPSULE ORAL at 08:04

## 2022-04-26 RX ADMIN — ACETAMINOPHEN 650 MG: 325 TABLET ORAL at 03:04

## 2022-04-26 NOTE — ASSESSMENT & PLAN NOTE
Bipolar 1 disorder    Per Psychiatry note,   - Patient is now in a Judicial Commitment Petition Filed Status (filed on 04/21/2022) due to continued grave disability 2/2 mental illness at this time (will have court paperwork scanned into chart once received from the hospital ).    - Once medically cleared / stable, seek transfer back to [facility] (or another inpatient psychiatric facility; patient may need Med-Psych placement) for continued mental health treatment / stabilization.  - Continue Risperdal to 3 mg PO BID and Depakote ER 500 mg PO QAM and 1250 mg PO QHS for Bipolar I Disorder and insomnia   - Continue Vistaril 50 mg PO TID (change to scheduled) for anxiety and/or insomnia   - HOLD previously outpt prescribed Vyvanse  - Can use Zyprexa 10 mg PO/IM q8 hours PRN for non-redirectable psychotic / manic agitation   - Continue suicide / violence precautions and continue to monitor the patient with a sitter while on a PEC / CEC / CHANCE filed status.

## 2022-04-26 NOTE — NURSING
Pt discharged home. Pt had no iv or tele monitor. All had been removed by pt on prior shifts. Pt was provided avs and reviewed at the bedside with sister present. Instructions on how to change dressing to ble reviewed. Pt verbalized instructions back and confirmed full understanding of dressing change. Dressing supplies provided including hydrafera blue dressing.

## 2022-04-26 NOTE — PROGRESS NOTES
PSYCHIATRY INPATIENT PROGRESS NOTE  SUBSEQUENT HOSPITAL VISIT      4/26/2022 3:17 PM   Audrey Natarajan   1970   [id number]           DATE OF ADMISSION: 4/12/2022 11:28 PM    SITE: [facility]    CURRENT LEGAL STATUS: Judicial Commitment Petition Filed on 04/21/2022         HISTORY    Per Initial History from Primary Team:   Audrey Natarajan is a 52 yo female with a pmh of DM2, bipolar 1 disorder, cellulitis, COPD, HTN, CAD, DVT/PE. She presented to the ED with c/o fevers x 1 day. She also has BLE swelling and pain and wounds to both feet, worsening x 2 weeks. She had fever up to 102F yesterday. She reports the wounds to her right foot are from dragging her feet while she was angry. Denies drainage to foot wounds. She has had blood clots in the past and has an IVC filter placed in 2012. She was sent here from [facility] where she was under CEC for psychosis. ED workup revealed BLE DVT on venous US, WBC 31, and Hgb 9.9. She received a dose of vancomycin while in the ED.   Interval History from Primary Team on 04/26/2022:  Stable for discharge. Patient unable to be accepted by inpatient psych facility as a result of contact precautions for MRSA.        Chief Complaint / Reason for Original Psychiatry Consult: Psychosis / Bipolar I Disorder; Patient from [facility] on PEC/CEC       Subjective / Interval Psychiatric History Today (04/26/2022) (with psychiatric ROS below):   Audrey Natarajan is a 51 y.o. female with a past medical history as noted above/below, and a past psychiatric history of Bipolar I Disorder, psychosis, depression, and ADHD, currently being treated by her inpatient primary team for a principle problem of acute DVTs of BLEs and wound infection.  Psychiatry was originally consulted as noted above.  The patient was seen and examined again this AM.  The chart was reviewed again this AM.  No PRN Zyprexa was needed over the past 2-3 days.  Patient was again more calm,  linear, and cooperative today.  Her speech was of a normal rate, tone, and volume.  Her thought process was more linear, logical, and organized.  Her affect was more appropriate with minimal lability.  She remains alert and oriented to person, place, city, state, month, year, and situation.  She remains CAM-ICU negative for delirium.  She denies issues with sleep overnight (slept 7-8 hours).  She endorses a good appetite.  She denies AH, VH, or TH (no RIS observed).  She denies any current/recent passive/active SI/HI.  She denies any adverse effects to her current medication regimen.  She is able to voice appropriate insight that medical compliance with her antibiotics will help to further heal her foot and medication compliance with her psychiatric meds with help maintain mental stability.  She denies any current medical/physical complaints at this time.  NAD was observed during the examination.  Patient endorses being able to tend to ADLs at this time (also states that she plans to stay with her step-daughter while her foot continues to heal so that her step-daughter can help her with any needs; step-daughter is in agreement).  See detailed psych ROS below.  Psychotherapy was again implemented as noted below with a focus on improving mood / bibi / thought process.  See A/P below.  I spoke to the patient's sister (Anitra) today, and she feels that the patient has improved to the point of no longer meeting PEC / CEC / CHANCE criteria and no longer requires further inpatient psychiatric treatment (I am in agreement).  Plan at this time is to discharge to home today with ASAP outpatient psychiatric f/u at [facility] with her well-established psychiatrist (Dr. Labadie).      Collateral:  Incorporated into the above-listed subjective info        Psychiatric Review Of Systems - Currently, the patient is endorsing and/or denying the following:  (patient's endorsements are BOLDED below; if not BOLDED, then patient  denied):     Denies Symptoms of Depression: diminished mood, low motivation, loss of interest/anhedonia, irritability, diminished energy, change in sleep, change in appetite, diminished concentration or cognition or indecisiveness, PMA/R, excessive guilt or hopelessness or worthlessness, suicidal ideations     Denies issues with Sleep: initiation, maintenance, early morning awakening with inability to return to sleep     Denies Suicidal/Homicidal ideations: active/passive ideations, organized plans, future intentions     Denies Symptoms of psychosis: paranoia, hallucinations, delusions, disorganized thinking, disorganized behavior or abnormal motor behavior, or negative symptoms (diminshed emotional expression, avolition, anhedonia, alogia, asociality)      Endorses Symptoms of bibi or hypomania: elevated, expansive, or irritable mood with increased energy or activity; with inflated self-esteem or grandiosity, decreased need for sleep, increased rate of speech, FOI or racing thoughts, distractibility (improving as noted above), increased goal directed activity or PMA, risky/disinhibited behavior     Denies Symptoms of MALISSA: excessive anxiety/worry/fear, more days than not, about numerous issues, difficult to control, with restlessness, fatigue, poor concentration, irritability, muscle tension, sleep disturbance; causes functionally impairing distress      Denies Symptoms of Panic Disorder: recurrent panic attacks, precipitated or un-precipitated, source of worry and/or behavioral changes secondary; with or without agoraphobia     Denies Symptoms of PTSD: h/o trauma; re-experiencing/intrusive symptoms, avoidant behavior, negative alterations in cognition or mood, or hyperarousal symptoms; with or without dissociative symptoms      Denies Symptoms of OCD: obsessions or compulsions      Denies Symptoms of Eating Disorders: anorexia, bulimia or binging     Denies Substance Use: intoxication, withdrawal, tolerance, used  in larger amounts or duration than intended, unsuccessful attempts to limit or quit, increased time engaging in or seeking out, cravings or strong desire to use, failure to fulfill obligations, negative consequences in social/interpersonal/occupational,/recreational areas, use in dangerous situations, medical or psychological consequences         PSYCHOTHERAPY ADD-ON +[id number]   30 (16-37*) minutes     Time: 20 minutes  Participants: Met with patient     Therapeutic Intervention Type: behavior modifying psychotherapy, supportive psychotherapy  Why chosen therapy is appropriate versus another modality: relevant to diagnosis, patient responds to this modality, evidence based practice     Target symptoms: mood / bibi / TERRI   Primary focus: improving mood / bibi / thought process   Psychotherapeutic techniques: supportive and psychodynamic techniques; psycho-education; deep breathing exercises; CBT; problem solving techniques and managing life/medical stressors     Outcome monitoring methods: self-report, observation     Patient's response to intervention:  The patient's response to intervention is more accepting today / notably improved today      Progress toward goals:  The patient's progress toward goals is fair / notably improved today         ROS  General ROS: negative for - chills, fatigue, fever or night sweats  Ophthalmic ROS: negative for - blurry vision, double vision or eye pain  ENT ROS: negative for - sinus pain, headaches, sore throat or visual changes  Allergy and Immunology ROS: negative for - hives, itchy/watery eyes or nasal congestion  Hematological and Lymphatic ROS: negative for - bleeding problems, bruising, jaundice or pallor  Endocrine ROS: negative for - galactorrhea, hot flashes, mood swings, palpitations or temperature intolerance  Respiratory ROS: negative for - cough, hemoptysis, shortness of breath, tachypnea or wheezing  Cardiovascular ROS: negative for - chest pain, dyspnea on exertion,  "loss of consciousness, palpitations, rapid heart rate or shortness of breath  Gastrointestinal ROS: negative for - appetite loss, nausea, abdominal pain, blood in stools, change in bowel habits, constipation or diarrhea  Genito-Urinary ROS: negative for - incontinence, nocturia or pelvic pain  Musculoskeletal ROS: negative for - joint stiffness, joint pain, or myalgias   Neurological ROS: negative for - behavioral changes, confusion, dizziness, memory loss, numbness/tingling or seizures  Dermatological ROS: negative for dry skin, hair changes, pruritus or rash; positive for color change / wound (improving)  Psychiatric ROS: see detailed psychiatric ROS above in subjective section       PAST MEDICAL & SURGICAL HISTORY   Past Medical History:   Diagnosis Date    ADHD (attention deficit hyperactivity disorder)     Arthritis     Asthma     Bipolar 1 disorder     Cataract     COPD (chronic obstructive pulmonary disease)     Coronary artery disease     A fib    Depression     bipolar manic depresson    Diabetes mellitus     DVT of lower extremity, bilateral July 2013    bilateral LE DVT. [place] filter placed.     Encounter for blood transfusion     History of blood clots 1. Left Leg=2003; 2.Bilateral Groin=Blood Clots= 5 or 6/ 2013 & 7/2013; 3. LLL of Lung=7/2013;  4. Lt. Lower Leg=7/2013.     Pt. had 1st Blood Clot after Tebnysnglamg=2808, & Last=2013. [place]elita Filter= Rt.Lateral Neck.    HTN (hypertension) 6/6/2013    Pt states that she does not have hypertension    Hypercholesteremia     Irregular heartbeat     Neuromuscular disorder     neuropathy feet    PE (pulmonary embolism) July 2013     bilat LE DVT.     Restless leg syndrome      Past Surgical History:   Procedure Laterality Date    ABDOMINAL SURGERY  2010    gastric sleeve    BILATERAL OOPHORECTOMY Bilateral 1/12/2015    CHOLECYSTECTOMY      Green' s filter Right 7/4/2012    Right Neck & Tunneled Down.    HERNIA REPAIR      "[place] " "of Hernias [employer] around th [place] pt. states    LAPAROSCOPIC CHOLECYSTECTOMY N/A 9/10/2020    Procedure: CHOLECYSTECTOMY, LAPAROSCOPIC;  Surgeon: Montrell Gutierrez MD;  [facility];  Service: General;  Laterality: N/A;  RN PREOP ----COVID Negative      OVARIAN CYST REMOVAL  3/13/2014    IN REMOVAL OF OVARY/TUBE(S)      SPLENECTOMY, TOTAL  2003    TONSILLECTOMY      as a child    TYMPANOSTOMY TUBE PLACEMENT      VEIN SURGERY      Lt leg       FAMILY HISTORY   Family History   Problem Relation Age of Onset    Hypertension Father     Diabetes Father     Heart disease Father     Cataracts Father     Diabetes Paternal Grandfather     Heart disease Paternal Grandfather     No Known Problems Mother     Ovarian cancer Maternal Grandmother          from this. ? age     No Known Problems Sister     No Known Problems Brother     No Known Problems Maternal Aunt     No Known Problems Maternal Uncle     No Known Problems Paternal Aunt     No Known Problems Paternal Uncle     No Known Problems Maternal Grandfather     Ovarian cancer Paternal Grandmother     Uterine cancer Neg Hx     Breast cancer Neg Hx     Colon cancer Neg Hx     Amblyopia Neg Hx     Blindness Neg Hx     Cancer Neg Hx     Glaucoma Neg Hx     Macular degeneration Neg Hx     Retinal detachment Neg Hx     Strabismus Neg Hx     Stroke Neg Hx     Thyroid disease Neg Hx        ALLERGIES   Review of patient's allergies indicates:   Allergen Reactions    Morphine Other (See Comments)     Patient had a psychotic episode after taking Morphine  Agitation, hallucinations    Penicillins Anaphylaxis     itching    Januvia [sitagliptin] Hives       CURRENT MEDICATION REGIMEN   Home Meds:   Prior to Admission medications    Medication Sig Start Date End Date Taking? Authorizing Provider   acetaminophen (TYLENOL) 500 MG tablet Take 2 tablets (1,000 mg total) by mouth every 6 (six) hours as needed for " Pain. 21  Yes Lary Sweet, DO   albuterol (PROVENTIL/VENTOLIN HFA) 90 mcg/actuation inhaler Inhale 2 puffs into the lungs every 6 (six) hours as needed for Wheezing. Use with spacer  Dispense with 1 spacer 21 Yes Lary Sweet, DO   albuterol-ipratropium (DUO-NEB) 2.5 mg-0.5 mg/3 mL nebulizer solution Take 3 mLs by nebulization every 6 (six) hours as needed for Wheezing or Shortness of Breath. Rescue 21 Yes Donaldo Pena MD   aspirin 81 MG Chew Take 1 tablet (81 mg total) by mouth once daily. 21 Yes Ifeoma Jacobs MD   blood sugar diagnostic Strp To check BG two  times daily, to use with insurance preferred meter 21  Yes Donaldo Pena MD   blood-glucose meter kit To check BG once daily, to use with insurance preferred meter 21 Yes Ifeoma Jacobs MD   dicyclomine (BENTYL) 20 mg tablet Take 1 tablet (20 mg total) by mouth every 6 (six) hours. 3/12/22  Yes Tu Arredondo PA-C   DUPIXENT  mg/2 mL PnIj SMARTSI Milligram(s) SUB-Q Every 2 Weeks 22  Yes Historical Provider   famotidine (PEPCID) 20 MG tablet Take 20 mg by mouth 2 (two) times daily.   Yes Historical Provider   fluticasone propionate (FLONASE) 50 mcg/actuation nasal spray 1 spray (50 mcg total) by Each Nostril route 2 (two) times daily. 21  Yes Lary Sweet, DO   fluticasone-salmeterol diskus inhaler 250-50 mcg Inhale 1 puff into the lungs 2 (two) times daily. Controller 21 Yes Donaldo Pena MD   furosemide (LASIX) 20 MG tablet TAKE 1 TABLET(20 MG) BY MOUTH EVERY DAY 22  Yes Donaldo Pena MD   gabapentin (NEURONTIN) 300 MG capsule TAKE 2 CAPSULES(600 MG) BY MOUTH TWICE DAILY 22  Yes Donaldo Pena MD   hydrOXYzine (ATARAX) 50 MG tablet Take 50 mg by mouth 4 (four) times daily as needed. 3/17/22  Yes Historical Provider   ibuprofen (ADVIL,MOTRIN) 600 MG tablet TAKE 1 TABLET(600 MG) BY MOUTH EVERY 8 HOURS AS NEEDED FOR PAIN OR BACK PAIN  4/11/22  Yes Donaldo Pena MD   lancets [place] To check BG two times daily, to use with insurance preferred meter 9/30/21  Yes Donaldo Pena MD   lisinopriL 10 MG tablet Take 1 tablet (10 mg total) by mouth once daily. 4/4/22  Yes Donaldo Pena MD   loratadine (CLARITIN) 10 mg tablet Take 1 tablet (10 mg total) by mouth once daily. 12/30/21 12/30/22 Yes Lary Sweet DO   metFORMIN (GLUCOPHAGE) 1000 MG tablet TAKE 1 TABLET(1000 MG) BY MOUTH TWICE DAILY WITH MEALS 12/26/21  Yes Donaldo Pena MD   metoprolol tartrate (LOPRESSOR) 50 MG tablet TAKE 1 TABLET(50 MG) BY MOUTH TWICE DAILY 12/26/21  Yes Donaldo Pena MD   OPTICHAMBER BIGG LG MASK Spcr Inhale into the lungs. 12/30/21  Yes Historical Provider   pantoprazole (PROTONIX) 40 MG tablet Take 1 tablet (40 mg total) by mouth once daily. 7/13/21  Yes Aiden Oleary MD   polyvinyl alcohol, artificial tears, (LIQUIFILM TEARS) 1.4 % ophthalmic solution Place 1 drop into both eyes 4 (four) times daily. 2/20/21  Yes Ifeoma Jacobs MD   pravastatin (PRAVACHOL) 40 MG tablet TAKE 1 TABLET(40 MG) BY MOUTH EVERY EVENING 12/26/21  Yes Donaldo Pena MD   senna (SENOKOT) 8.6 mg tablet Take 1 tablet by mouth 2 (two) times a day. 2/20/21  Yes Ifeoma Jacobs MD   TRUE METRIX GLUCOSE METER [place] CHECK BLOOD SUGAR TWICE DAILY 11/4/21  Yes Donaldo Pena MD   blood-glucose meter kit To check BG two times daily, to use with insurance preferred meter 9/30/21 9/30/22  Donaldo Pena MD   divalproex (DEPAKOTE) 250 MG EC tablet Take 5 tablets (1,250 mg total) by mouth every evening. 4/25/22 4/25/23  Aniya Ramirez PA-C   divalproex (DEPAKOTE) 500 MG TbEC Take 1 tablet (500 mg total) by mouth once daily. PO QAM 4/26/22 4/26/23  Aniya Ramirez PA-C   doxycycline (VIBRAMYCIN) 100 MG Cap Take 1 capsule (100 mg total) by mouth every 12 (twelve) hours. for 9 days 4/25/22 5/4/22  Aniya Ramirez PA-C   enoxaparin (LOVENOX) 100 mg/mL Syrg Inject 1 mL (100 mg total) into  the skin every 12 (twelve) hours. 4/18/22   Diego Ridley MD   EPITOL 200 mg tablet  2/26/21   Historical Provider   folic acid (FOLVITE) 1 MG tablet Take 1 tablet (1 mg total) by mouth once daily. 7/19/21 10/17/21  Donaldo Pena MD   hydrOXYzine pamoate (VISTARIL) 50 MG Cap Take 1 capsule (50 mg total) by mouth every 8 (eight) hours as needed (insomnia). 4/18/22   Diego iRdley MD   hydrOXYzine pamoate (VISTARIL) 50 MG Cap Take 1 capsule (50 mg total) by mouth 3 (three) times daily. 4/25/22 7/24/22  Aniya Ramirez PA-C   magnesium oxide (MAG-OX) 400 mg (241.3 mg magnesium) tablet Take 1 tablet (400 mg total) by mouth 2 (two) times daily. 4/25/22   Aniya Ramirez PA-C   metroNIDAZOLE (FLAGYL) 500 MG tablet Take 1 tablet (500 mg total) by mouth every 8 (eight) hours. for 9 days 4/25/22 5/4/22  Aniya Ramirez PA-C   multivitamin Tab Take 1 tablet by mouth once daily. 2/20/21   Ifeoma Jacobs MD   OLANZapine (ZYPREXA) injection Inject 10 mg into the muscle every 8 (eight) hours as needed for Agitation (non-redirectable psychotic / manic agitation). 4/25/22 4/25/23  Aniya Ramirez PA-C   risperiDONE (RISPERDAL M-TABS) 3 MG disintegrating tablet Take 1 tablet (3 mg total) by mouth 2 (two) times daily. 4/25/22 4/25/23  Aniya Ramirez PA-C   diclofenac sodium (VOLTAREN) 1 % Gel Apply 2 g topically 4 (four) times daily as needed (Apply to painful area up to 4 times a day as needed for pain). Apply to painful area 4 times a day as needed for pain 10/1/21 4/18/22  Corie Iqbal NP   nystatin (NYSTOP) powder APPLY TOPICALLY TO AXILLA AND BREAST FOLDS TWICE DAILY 9/30/21 4/18/22  Donaldo Pena MD   QUEtiapine (SEROQUEL) 200 MG Tab Take 1 tablet (200 mg total) by mouth before breakfast. 2/21/21 4/18/22  Ifeoma Jacobs MD       Scheduled Meds:    apixaban  5 mg Oral BID    aspirin  81 mg Oral Daily    divalproex  1,250 mg Oral QHS    divalproex  500 mg Oral Daily    doxycycline  100 mg Oral Q12H    famotidine  20  mg Oral Daily    ferrous sulfate  1 tablet Oral Daily    fluticasone furoate-vilanteroL  1 puff Inhalation Daily    gabapentin  300 mg Oral BID    hydrOXYzine pamoate  50 mg Oral TID    insulin detemir U-100  10 Units Subcutaneous QHS    magnesium oxide  400 mg Oral BID    metoprolol tartrate  50 mg Oral BID    metroNIDAZOLE  500 mg Oral Q8H    miconazole NITRATE 2 %   Topical (Top) BID    nicotine  1 patch Transdermal Daily    pravastatin  40 mg Oral QHS    risperiDONE  3 mg Oral Nightly    risperiDONE  3 mg Oral Daily      PRN Meds: acetaminophen, albuterol-ipratropium, dextrose 10%, dextrose 10%, glucagon (human recombinant), glucose, glucose, HYDROcodone-acetaminophen, hydrocortisone, insulin aspart U-100, melatonin, naloxone, OLANZapine, ondansetron, senna-docusate 8.6-50 mg, sodium chloride 0.9%, DIPH,PERTUSS(ACELL),TET VACCINE (ADULT)(BOOSTRIX,ADACEL)   Psychotherapeutics (From admission, onward)            Start     Stop Route Frequency Ordered    04/26/22 0900  risperiDONE disintegrating tablet 3 mg         -- Oral Daily 04/25/22 1353    04/23/22 0024  OLANZapine injection 10 mg         -- IM Every 8 hours PRN 04/22/22 2324    04/19/22 2100  risperiDONE disintegrating tablet 3 mg         -- Oral Nightly 04/19/22 1439          LABORATORY DATA   Recent Results (from the past 72 hour(s))   POCT glucose    Collection Time: 04/23/22  8:13 PM   Result Value Ref Range    POCT Glucose 270 (H) 70 - 110 mg/dL   Urinalysis, Reflex to Urine Culture Urine, Clean Catch    Collection Time: 04/23/22  9:44 PM    Specimen: Urine   Result Value Ref Range    Specimen UA Urine, Clean Catch     Color, UA Yellow Yellow, Straw, Tanya    Appearance, UA Clear Clear    pH, UA 6.0 5.0 - 8.0    Specific Gravity, UA 1.020 1.005 - 1.030    Protein, UA Trace (A) Negative    Glucose, UA 2+ (A) Negative    Ketones, UA Trace (A) Negative    Bilirubin (UA) Negative Negative    Occult Blood UA Negative Negative    Nitrite, UA  Negative Negative    Urobilinogen, UA Negative <2.0 EU/dL    Leukocytes, UA 1+ (A) Negative   Urinalysis Microscopic    Collection Time: 04/23/22  9:44 PM   Result Value Ref Range    RBC, UA 1 0 - 4 /hpf    WBC, UA 11 (H) 0 - 5 /hpf    Bacteria Rare None-Occ /hpf    Squam Epithel, UA 3 /hpf    Microscopic Comment SEE COMMENT    VANCOMYCIN, TROUGH    Collection Time: 04/24/22  2:25 AM   Result Value Ref Range    Vancomycin-Trough 21.1 10.0 - 22.0 ug/mL   Renal Function Panel    Collection Time: 04/24/22  2:25 AM   Result Value Ref Range    Glucose 209 (H) 70 - 110 mg/dL    Sodium 134 (L) 136 - 145 mmol/L    Potassium 5.2 (H) 3.5 - 5.1 mmol/L    Chloride 97 95 - 110 mmol/L    CO2 27 23 - 29 mmol/L    BUN 19 6 - 20 mg/dL    Calcium 9.2 8.7 - 10.5 mg/dL    Creatinine 0.7 0.5 - 1.4 mg/dL    Albumin 2.8 (L) 3.5 - 5.2 g/dL    Phosphorus 4.5 2.7 - 4.5 mg/dL    eGFR if African American >60 >60 mL/min/1.73 m^2    eGFR if non African American >60 >60 mL/min/1.73 m^2    Anion Gap 10 8 - 16 mmol/L   POCT glucose    Collection Time: 04/24/22  7:32 AM   Result Value Ref Range    POCT Glucose 224 (H) 70 - 110 mg/dL   VANCOMYCIN, TROUGH    Collection Time: 04/24/22  1:16 PM   Result Value Ref Range    Vancomycin-Trough 13.0 10.0 - 22.0 ug/mL   POCT glucose    Collection Time: 04/24/22  8:06 PM   Result Value Ref Range    POCT Glucose 218 (H) 70 - 110 mg/dL   POCT glucose    Collection Time: 04/25/22  4:25 AM   Result Value Ref Range    POCT Glucose 203 (H) 70 - 110 mg/dL   CBC auto differential    Collection Time: 04/25/22  7:27 AM   Result Value Ref Range    WBC 15.92 (H) 3.90 - 12.70 K/uL    RBC 3.31 (L) 4.00 - 5.40 M/uL    Hemoglobin 9.2 (L) 12.0 - 16.0 g/dL    Hematocrit 27.8 (L) 37.0 - 48.5 %    MCV 84 82 - 98 fL    MCH 27.8 27.0 - 31.0 pg    MCHC 33.1 32.0 - 36.0 g/dL    RDW 15.5 (H) 11.5 - 14.5 %    Platelets 817 (H) 150 - 450 K/uL    MPV 10.3 9.2 - 12.9 fL    Immature Granulocytes 1.3 (H) 0.0 - 0.5 %    Gran # (ANC) 9.3 (H)  1.8 - 7.7 K/uL    Immature Grans (Abs) 0.21 (H) 0.00 - 0.04 K/uL    Lymph # 4.2 1.0 - 4.8 K/uL    Mono # 1.8 (H) 0.3 - 1.0 K/uL    Eos # 0.4 0.0 - 0.5 K/uL    Baso # 0.11 0.00 - 0.20 K/uL    nRBC 0 0 /100 WBC    Gran % 58.3 38.0 - 73.0 %    Lymph % 26.1 18.0 - 48.0 %    Mono % 11.3 4.0 - 15.0 %    Eosinophil % 2.3 0.0 - 8.0 %    Basophil % 0.7 0.0 - 1.9 %    Platelet Estimate Increased (A)     [place] Slight     Poik Slight     Hypo Occasional     Ovalocytes Occasional     [place] Cells Occasional     Differential Method Automated    Renal function panel    Collection Time: 04/25/22  7:27 AM   Result Value Ref Range    Glucose 223 (H) 70 - 110 mg/dL    Sodium 130 (L) 136 - 145 mmol/L    Potassium 5.0 3.5 - 5.1 mmol/L    Chloride 93 (L) 95 - 110 mmol/L    CO2 28 23 - 29 mmol/L    BUN 15 6 - 20 mg/dL    Calcium 9.4 8.7 - 10.5 mg/dL    Creatinine 0.7 0.5 - 1.4 mg/dL    Albumin 2.8 (L) 3.5 - 5.2 g/dL    Phosphorus 4.3 2.7 - 4.5 mg/dL    eGFR if African American >60 >60 mL/min/1.73 m^2    eGFR if non African American >60 >60 mL/min/1.73 m^2    Anion Gap 9 8 - 16 mmol/L   Magnesium    Collection Time: 04/25/22  7:27 AM   Result Value Ref Range    Magnesium 1.7 1.6 - 2.6 mg/dL   POCT glucose    Collection Time: 04/25/22 11:29 AM   Result Value Ref Range    POCT Glucose 220 (H) 70 - 110 mg/dL   POCT glucose    Collection Time: 04/25/22  4:14 PM   Result Value Ref Range    POCT Glucose 220 (H) 70 - 110 mg/dL   CBC auto differential    Collection Time: 04/26/22  3:44 AM   Result Value Ref Range    WBC 13.21 (H) 3.90 - 12.70 K/uL    RBC 3.54 (L) 4.00 - 5.40 M/uL    Hemoglobin 9.7 (L) 12.0 - 16.0 g/dL    Hematocrit 30.8 (L) 37.0 - 48.5 %    MCV 87 82 - 98 fL    MCH 27.4 27.0 - 31.0 pg    MCHC 31.5 (L) 32.0 - 36.0 g/dL    RDW 15.9 (H) 11.5 - 14.5 %    Platelets 618 (H) 150 - 450 K/uL    MPV 10.7 9.2 - 12.9 fL    Immature Granulocytes 1.1 (H) 0.0 - 0.5 %    Gran # (ANC) 7.4 1.8 - 7.7 K/uL    Immature Grans (Abs) 0.15 (H) 0.00 - 0.04  "K/uL    Lymph # 3.7 1.0 - 4.8 K/uL    Mono # 1.4 (H) 0.3 - 1.0 K/uL    Eos # 0.4 0.0 - 0.5 K/uL    Baso # 0.11 0.00 - 0.20 K/uL    nRBC 0 0 /100 WBC    Gran % 56.4 38.0 - 73.0 %    Lymph % 28.2 18.0 - 48.0 %    Mono % 10.7 4.0 - 15.0 %    Eosinophil % 2.8 0.0 - 8.0 %    Basophil % 0.8 0.0 - 1.9 %    Differential Method Automated    POCT glucose    Collection Time: 04/26/22  3:58 AM   Result Value Ref Range    POCT Glucose 213 (H) 70 - 110 mg/dL   POCT glucose    Collection Time: 04/26/22 11:19 AM   Result Value Ref Range    POCT Glucose 173 (H) 70 - 110 mg/dL      Lab Results   Component Value Date    VALPROATE 65.9 04/23/2022    CBMZ 3.6 (L) 01/26/2021         EXAMINATION    VITALS   Vitals:    04/26/22 0808 04/26/22 0822 04/26/22 1117 04/26/22 1600   BP: 134/60 134/60 132/64    BP Location: Left arm Right arm Right arm    Patient Position: Sitting Lying Sitting    Pulse: 90 94 77    Resp: 18 18 18    Temp: 99 °F (37.2 °C) 99 °F (37.2 °C) 97.5 °F (36.4 °C)    TempSrc: Oral Oral Oral    SpO2: 99% 100% 98% Comment: pt with MD   Weight:       Height:            CONSTITUTIONAL  General Appearance: NAD, unremarkable, age appropriate, improved hygiene, standing next to bed, calm, overweight     MUSCULOSKELETAL  Muscle Strength and Tone: WNL  Abnormal Involuntary Movements: none observed   Gait and Station: WNL; non-ataxic      PSYCHIATRIC   Behavior/Cooperation:  more cooperative, calm, friendly, eye contact improved   Speech:  normal tone, normal pitch, normal volume, normal rate   Language: grossly intact, able to name, able to repeat with spontaneous speech  Mood: "I'm better; just ready to go home"  Affect:  More appropriate / full / reactive ; much less labile   Associations: no TERRI  Thought Process: More linear and logical   Thought Content: denies SI, HI, AH, VH, TH, delusions, or paranoia (no RIS observed)  Sensorium:  Awake  Alert and Oriented: to person, place, situation, month of year, year  Memory: 3/3 " immediate, 3/3 at 5 minutes               Recent: Intact; able to report recent events              Remote: Intact; Named 4/4 past presidents   Attention/concentration: Intact. Appropriate for age/education. Able to spell w-o-r-l-d & d-l-r-o-w.   Similarities: Intact (difference between apple and orange?)  Abstract reasoning: Intact   Insight: Intact   Judgment: Intact      CAM ICU Delirium Assessment - NEGATIVE        Is the patient aware of the biomedical complications associated with substance abuse and mental illness? yes           MEDICAL DECISION MAKING     ASSESSMENT      Bipolar I Disorder (improving / improved)   Unspecified Insomnia (improving / improved)   Hx of ADHD      RECOMMENDATIONS       - Rescind Judicial Commitment Petition Filed Status due to patient no longer being gravely disabled due to mental illness at this time.      - I spoke to the patient's sister (Anitra) today, and she feels that the patient has improved to the point of no longer meeting PEC / CEC / CHANCE criteria and no longer requires further inpatient psychiatric treatment (I am in agreement).  Plan at this time is to discharge to home to live with step-daughter today with ASAP outpatient psychiatric f/u at [facility] with her well-established psychiatrist (Dr. Labadie).         - Continue Risperdal 3 mg PO BID and continue Depakote ER 500 mg PO QAM and 1250 mg PO QHS for Bipolar I Disorder and insomnia (discussed risks/benefits/alt vs no treatment with patient).     - Continue Vistaril 50 mg PO TID for anxiety and/or insomnia (discussed risks/benefits/alt vs no treatment with patient).     - HOLD previously outpt prescribed Vyvanse (discussed risks/benefits/alt vs no treatment with patient).     - Psychotherapy was again performed with patient as noted above with a focus on improving mood / bibi / thought process.        - Patient's most recent labs, imaging, and EKG were reviewed again today ; VPA level on 04/23/2022 was 65.9      -  Please have CM/SW assist patient with ASAP outpatient mental health f/u (patient sees Dr. Labadie at [facility]) for s/p discharge from this facility (as noted above).       - Patient (and her sister and step-daughter) were instructed to call 911 and return to the nearest ED if patient begins feeling suicidal, homicidal, or gravely disabled (for s/p this hospitalization).      - Patient's sister (Anitra) plans to pick patient up today at around 6 pm.       - Thank you for this consult        Total time spent with patient and/or managing/coordinating patient's care today (excluding the time spent on psychotherapy): 42 minutes   Time spent on psychotherapy today (as noted above): 20 minutes   Total time for encounter today including psychotherapy: 62 minutes      More than 50% of the time was spent counseling/coordinating care.     Consulting clinician was informed of the encounter and consult note.      STAFF:  Magdiel Del Real MD  [facility]  4/26/2022

## 2022-04-26 NOTE — ASSESSMENT & PLAN NOTE
B/l foot ulcers  - Podiatry consulted,  - Xray in feet bilaterally, findings consistent with Charcot joint of left foot, no acute abnormality   - MRI ordered edema noted as well as charcot changes of left foot without evidence of osteomyelitis  - [facility] ulcer debrided with deep cultures taken.   - Site dressed with xeroform and kerlix. Nursing orders in for daily dressing changes  - She is NWB to [facility] due to ulcer and history of charcot foot.  - Will continue to follow  - Cont clindamycin - culture growing Staph aureus--sensitive to clinda, switched   - Cultures grew out MRSA and anerobic bacteria; patient is septic today and rising WBC; switched clinda to bactrim and adding flagyl;       - Leukocytosis improving today, per Heme/onc patient baseline around 15 given asplenia  - D/C vancomycin upon discharge  - Per ID rec, 2 week course of flagyl and doxycyline  - Outpatient follow up with podiatry

## 2022-04-26 NOTE — PROGRESS NOTES
[facility] Medicine  Progress Note    Patient Name: Audrey Natarajan  MRN: [id number]  Patient Class: IP- Inpatient   Admission Date: 4/12/2022  Length of Stay: 8 days  Attending Physician: Augusto May MD  Primary Care Provider: Donaldo Pena MD        Subjective:     Principal Problem:Diabetic foot ulcer associated with type 2 diabetes mellitus        HPI:  Audrey Natarajan is a 50 yo female with a pmh of DM2, bipolar 1 disorder, cellulitis, COPD, HTN, CAD, DVT/PE. She presented to the ED with c/o fevers x 1 day. She also has BLE swelling and pain and wounds to both feet, worsening x 2 weeks. She had fever up to 102F yesterday. She reports the wounds to her right foot are from dragging her feet while she was angry. Denies drainage to foot wounds. She has had blood clots in the past and has an IVC filter placed in 2012. She was sent here from [facility] where she was under CEC for psychosis. ED workup revealed BLE DVT on venous US, WBC 31, and Hgb 9.9. She received a dose of vancomycin while in the ED.       Overview/Hospital Course:  Admitted for diabetic foot infection, cellulitis, and DVT. Patient is now in a Judicial Commitment Petition Filed Status (filed on 04/21/2022) due to continued grave disability 2/2 mental illness at this time (will have court paperwork scanned into chart once received from the hospital ). History of DVT/PE, hypercoagulable state, IVC filter in place. Therapeutic lovenox initiated, transitioned to eliquis. Podiatry consulted for diabetic foot ulcer, debrided on 4/13 with cultures obtained - growing MRSA. Started on IV Clindamycin, switched to bactrim with flagyl, with further discontinuation of bactrim and initiation of vancomycin. ID consulted to follow for antibiotic regimen. Psych consulted for medication evaluation, pt with CEC from behavioral health facility. Now a Judicial Commitment Petition Filed Status (filed 4/21) due to  continued grave disability 2/2 mental illness at this time (will have court paperwork scanned into chart once received from the hospital ) facility. Developed ODESSA with increase in Cr to 2.4 as well as hyperkalemia, nephrology consulted for assistance in management. ODESSA and hyperkalemia have since resolved (4/25). Renal US showed minimally dilated central renal collecting system on the left and mildly elevated resistive indices, findings which may be seen in setting of medical renal disease. Heme/onc consulted given iliofemoral lymphadenopathy, possibly reactive or neoplastic identified on CT abd/pelvis on 4/19, likely secondary to panniculitis, no further work up needed. Patient stable for discharge to inpatient psych facility with two week course (end date 5/4) of doxycycline and metronidazole with podiatry and PCP follow up. Contact precautions required for MRSA infection present barrier to discharge as patient is not likely to be accepted by an inpatient psych facility with active precautions. Psychiatric is working  on placement for patient.      Interval History: Stable for discharge. Patient unable to be accepted by inpatient psych facility as a result of contact precautions for MRSA.     Review of Systems   Constitutional:  Negative for appetite change and fever.   HENT:  Negative for congestion, rhinorrhea and sore throat.    Eyes:  Negative for photophobia and visual disturbance.   Respiratory:  Negative for cough and shortness of breath.    Cardiovascular:  Positive for leg swelling. Negative for chest pain.   Gastrointestinal:  Negative for abdominal pain, constipation, diarrhea, nausea and vomiting.   Genitourinary:  Negative for dysuria and hematuria.   Musculoskeletal:  Positive for arthralgias.   Skin:  Positive for color change and wound.   Neurological:  Negative for speech difficulty, weakness and headaches.   Psychiatric/Behavioral:  Positive for behavioral problems. Negative for agitation,  confusion and suicidal ideas.    Objective:     Vital Signs (Most Recent):  Temp: 97.5 °F (36.4 °C) (04/26/22 1117)  Pulse: 77 (04/26/22 1117)  Resp: 18 (04/26/22 1117)  BP: 132/64 (04/26/22 1117)  SpO2: 98 % (04/26/22 1117) Vital Signs (24h Range):  Temp:  [96.2 °F (35.7 °C)-99 °F (37.2 °C)] 97.5 °F (36.4 °C)  Pulse:  [73-94] 77  Resp:  [18] 18  SpO2:  [95 %-100 %] 98 %  BP: (126-149)/(59-66) 132/64     Weight: 114.1 kg (251 lb 8.7 oz)  Body mass index is 41.86 kg/m².    Intake/Output Summary (Last 24 hours) at 4/26/2022 1215  Last data filed at 4/26/2022 0400  Gross per 24 hour   Intake --   Output 2000 ml   Net -2000 ml      Physical Exam  Vitals and nursing note reviewed.   Constitutional:       General: She is not in acute distress.     Appearance: She is obese. She is not toxic-appearing.   HENT:      Head: Normocephalic and atraumatic.      Nose: Nose normal.      Mouth/Throat:      Mouth: Mucous membranes are moist.   Eyes:      Pupils: Pupils are equal, round, and reactive to light.   Cardiovascular:      Rate and Rhythm: Normal rate and regular rhythm.      Pulses: Normal pulses.      Heart sounds: Normal heart sounds.   Pulmonary:      Effort: Pulmonary effort is normal.      Breath sounds: Normal breath sounds.   Abdominal:      General: Bowel sounds are normal.      Palpations: Abdomen is soft.      Tenderness: There is abdominal tenderness (lower abdomen).   Musculoskeletal:         General: Normal range of motion.      Cervical back: Normal range of motion.      Right lower leg: Edema present.      Left lower leg: Edema present.   Skin:     General: Skin is warm and dry.      Comments: Both feet bandaged   Neurological:      Mental Status: She is alert and oriented to person, place, and time.   Psychiatric:         Behavior: Behavior normal.         Thought Content: Thought content normal.       Significant Labs: All pertinent labs within the past 24 hours have been reviewed.  BMP:   Recent Labs   Lab  04/25/22  0727   *   *   K 5.0   CL 93*   CO2 28   BUN 15   CREATININE 0.7   CALCIUM 9.4   MG 1.7     CBC:   Recent Labs   Lab 04/25/22  0727 04/26/22  0344   WBC 15.92* 13.21*   HGB 9.2* 9.7*   HCT 27.8* 30.8*   * 618*     CMP:   Recent Labs   Lab 04/25/22  0727   *   K 5.0   CL 93*   CO2 28   *   BUN 15   CREATININE 0.7   CALCIUM 9.4   ALBUMIN 2.8*   ANIONGAP 9   EGFRNONAA >60       Significant Imaging: I have reviewed all pertinent imaging results/findings within the past 24 hours.      Assessment/Plan:      * Diabetic foot ulcer associated with type 2 diabetes mellitus  B/l foot ulcers  - Podiatry consulted,  - Xray in feet bilaterally, findings consistent with Charcot joint of left foot, no acute abnormality   - MRI ordered edema noted as well as charcot changes of left foot without evidence of osteomyelitis  - [facility] ulcer debrided with deep cultures taken.   - Site dressed with xeroform and kerlix. Nursing orders in for daily dressing changes  - She is NWB to [facility] due to ulcer and history of charcot foot.  - Will continue to follow  - Cont clindamycin - culture growing Staph aureus--sensitive to clinda, switched   - Cultures grew out MRSA and anerobic bacteria; patient is septic today and rising WBC; switched clinda to bactrim and adding flagyl;       - Leukocytosis improving today, per Heme/onc patient baseline around 15 given asplenia  - D/C vancomycin upon discharge  - Per ID rec, 2 week course of flagyl and doxycyline (end date 5/4)  - Outpatient follow up with podiatry    Lymphadenopathy, inguinal  See Panniculitis    Hyperkalemia  Developed during admission, increased to 5.4 and to 5.7    - Resolved prior to discharge  - Given Lokelma  - Nephrology folowing  - Renal US completed to rule out obstruction    Panniculitis  Lymphadenopathy, inguinal  Identified on CT abdomen pelvis on 4/19, no underlying hematoma or abscess    - Heme/Onc consulted, recommendations as follows  -  likely secondary to Panniculitis  - no further workup needed    ODESSA (acute kidney injury)  No history of CKD  Creatinine baseline around 0.6-0.7, developed during admission likely secondary to bactrim use, increased to 2.4 and trended down to 1.0  US kidney showed renal US showed minimally dilated central renal collecting system on the left and mildly elevated resistive indices, findings which may be seen in setting of medical renal disease    - Currently resolved  - Daily Renal Function Panel  - Check Vanc levels before each dose  - Need strict I&Os  - Hold off on serological work up for now,  get basic pending labs today   - No Indication for RRT at this time, K+ acceptable, No Uremia symptoms.  - Avoid Hypotension.  - Renally dose all meds  - Please avoid nephrotoxins, including NSAIDs, aminoglycosides, IV contrast (unless absolutely necessary), gadolinium, fleets and other phosphorous-based laxatives. Caution with antibiotics    Sepsis due to methicillin resistant Staphylococcus aureus (MRSA)  - see principle problem    Anemia  Denies bleeding, baseline 12-13, 9.9 on admit  Iron panel: Iron 13, TIBC 456, Sat Iron 3, Transferrin and Ferritin WNL    - Iron supplement  - Continue to monitor with daily CBC    Acute deep vein thrombosis (DVT) of both lower extremities  Hx of DVT/PE, hypercoagulable state, IVC filter in place  BLE venous US with thrombosis of bilateral posterior tibial veins  Initially started on therapeutic lovenox    - Continue Eliquis    SHAWN (obstructive sleep apnea)  Does not use CPAP    Psychosis  Bipolar 1 disorder    Per Psychiatry note,   - Patient is now in a Judicial Commitment Petition Filed Status (filed on 04/21/2022) due to continued grave disability 2/2 mental illness at this time (will have court paperwork scanned into chart once received from the hospital ).    - Once medically cleared / stable, seek transfer back to [facility] (or another inpatient psychiatric facility;  patient may need Med-Psych placement) for continued mental health treatment / stabilization.  - Continue Risperdal to 3 mg PO BID and Depakote ER 500 mg PO QAM and 1250 mg PO QHS for Bipolar I Disorder and insomnia   - Continue Vistaril 50 mg PO TID (change to scheduled) for anxiety and/or insomnia   - HOLD previously outpt prescribed Vyvanse  - Can use Zyprexa 10 mg PO/IM q8 hours PRN for non-redirectable psychotic / manic agitation   - Continue suicide / violence precautions and continue to monitor the patient with a sitter while on a PEC / CEC / CHANCE filed status.     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  Latest blood pressure and vitals reviewed-   Temp:  [96.2 °F (35.7 °C)-99 °F (37.2 °C)]   Pulse:  [73-94]   Resp:  [18]   BP: (126-149)/(59-66)   SpO2:  [95 %-100 %] .   Home meds for hypertension were reviewed and noted below.   Hypertension Medications             furosemide (LASIX) 20 MG tablet TAKE 1 TABLET(20 MG) BY MOUTH EVERY DAY    lisinopriL 10 MG tablet Take 1 tablet (10 mg total) by mouth once daily.    metoprolol tartrate (LOPRESSOR) 50 MG tablet TAKE 1 TABLET(50 MG) BY MOUTH TWICE DAILY        While in the hospital, will manage blood pressure as follows; Adjust home antihypertensive regimen as follows- Hold lisinopril  and lasix in the setting of ODESSA, resume lopressor    Will utilize p.r.n. blood pressure medication only if patient's blood pressure greater than  180/110 and she develops symptoms such as worsening chest pain or shortness of breath.      VTE Risk Mitigation (From admission, onward)         Ordered     IP VTE HIGH RISK PATIENT  Once         04/13/22 0246     Place sequential compression device  Until discontinued         04/13/22 0246                Discharge Planning   HUMBERTO: 4/25/2022     Code Status: Full Code   Is the patient medically ready for discharge?:     Reason for patient still in hospital (select all that apply): Pending disposition  Discharge Plan A: Psychiatric hospital   Discharge Delays:  (Pending accepting IP Psych Facility.)              Aniya Ramirez PA-C  Department of Hospital Medicine   [facility]

## 2022-04-26 NOTE — ASSESSMENT & PLAN NOTE
Developed during admission, increased to 5.4 and to 5.7    - Resolved prior to discharge  - Given [facility] Nephrology folowing  - Renal US completed to rule out obstruction

## 2022-04-26 NOTE — ASSESSMENT & PLAN NOTE
B/l foot ulcers  - Podiatry consulted,  - Xray in feet bilaterally, findings consistent with Charcot joint of left foot, no acute abnormality   - MRI ordered edema noted as well as charcot changes of left foot without evidence of osteomyelitis  - E ulcer debrided with deep cultures taken.   - Site dressed with xeroform and kerlix. Nursing orders in for daily dressing changes  - She is NWB to [facility] due to ulcer and history of charcot foot.  - Will continue to follow  - Cont clindamycin - culture growing Staph aureus--sensitive to clinda, switched   - Cultures grew out MRSA and anerobic bacteria; patient is septic today and rising WBC; switched clinda to bactrim and adding flagyl;       - Leukocytosis improving today, per Heme/onc patient baseline around 15 given asplenia  - D/C vancomycin upon discharge  - Per ID rec, 2 week course of flagyl and doxycyline (end date 5/4)  - Outpatient follow up with podiatry

## 2022-04-26 NOTE — ASSESSMENT & PLAN NOTE
Developed during admission, increased to 5.4 and to 5.7    - Resolved prior to discharge  - Given [facility] Nephrology folowing  - Renal US completed to rule out obstruction

## 2022-04-26 NOTE — PLAN OF CARE
Pt on RA with documented sats. The proper method of use, as well as anticipated side effects, of this metered-dose inhaler are discussed and demonstrated to the patient. Will continue to monitor.

## 2022-04-26 NOTE — ASSESSMENT & PLAN NOTE
Chronic, controlled.  Latest blood pressure and vitals reviewed-   Temp:  [96.2 °F (35.7 °C)-99 °F (37.2 °C)]   Pulse:  [73-94]   Resp:  [18]   BP: (126-149)/(59-66)   SpO2:  [95 %-100 %] .   Home meds for hypertension were reviewed and noted below.   Hypertension Medications             furosemide (LASIX) 20 MG tablet TAKE 1 TABLET(20 MG) BY MOUTH EVERY DAY    lisinopriL 10 MG tablet Take 1 tablet (10 mg total) by mouth once daily.    metoprolol tartrate (LOPRESSOR) 50 MG tablet TAKE 1 TABLET(50 MG) BY MOUTH TWICE DAILY        While in the hospital, will manage blood pressure as follows; Adjust home antihypertensive regimen as follows- Hold lisinopril and lasix in the setting of ODESSA, resume lopressor    Will utilize p.r.n. blood pressure medication only if patient's blood pressure greater than  180/110 and she develops symptoms such as worsening chest pain or shortness of breath.

## 2022-04-26 NOTE — PLAN OF CARE
Per Omar, Her family is willing to take her in, Dr. Del Real is rescinding the CHANCE so she is good to go home. Aniya spoke with myself and CASI Shi RN. Patient is refusing home health to be set up. She stated she would be able to complete her wound care at home. Bedside nurse Kerri will educate patient on wound care and give supplies as well. PCP and Podiatry follow-up appointments scheduled. Wound care clinic referral placed, will see if patient can get scheduled near her home. Patient will also need to schedule her own Psych appointment.     1715--TN met with patient. I confirmed her sister will transport home tonight after work. Follow-up appointments reviewed. She is aware she needs to call to schedule psych follow-up. I also informed her trying to schedule her at wound clinic near her home. She will be called with that follow-up information. Patient verbalized understanding and all questions answered.    I placed original PEC/CEC back in patient's blue folder. Charge nurse Lauren guthrie.    Patient Contacts    Name Relation Home Work Mobile   Anitra Cain Sister [phone]     Tabitha Man Other [phone]     Catia Weathers Mother   [phone]   Ana Lopez Daughter   [phone]       Future Appointments   Date Time Provider Department Center   4/28/2022  2:00 PM Donaldo Pena MD [facility] FM IM [facility] - B   4/28/2022  2:30 PM Carley Palomo [facility] SMOKE [place]   5/9/2022  2:00 PM Saloni De Anda MD [facility] GASTRO [facility] Cli   5/26/2022  3:00 PM Maira De Los Santos DPM [facility] POD [place]     Wound ClinicNext Steps: Follow upAppointment: Instructions: Referral placed for wound clinic follow-up. You will be contacted to schedule. We are attempting to get follow-up closer to your home.    [facility] - [phone] [facility] [address] [place] [id number] Steps: Follow upAppointment: Instructions: Psychiatry Follow-up.  Please contact office to schedule follow-up. Patient is seen by Dr. Labadie.     04/26/22 1707   Final Note   Assessment Type Final Discharge Note   Anticipated Discharge Disposition Home   Hospital Resources/Appts/Education Provided Appointments scheduled by Navigator/Coordinator   Post-Acute Status   Discharge Delays None known at this time       Sammi Root RN    [phone]

## 2022-04-26 NOTE — SUBJECTIVE & OBJECTIVE
Interval History: VSS. Leukocytosis and thrombocytosis at baseline. D/C with two week course of doxycycline and flagyl. Stable for discharge pending placement    Review of Systems   Constitutional:  Negative for appetite change and fever.   HENT:  Negative for congestion, rhinorrhea and sore throat.    Eyes:  Negative for photophobia and visual disturbance.   Respiratory:  Negative for cough and shortness of breath.    Cardiovascular:  Positive for leg swelling. Negative for chest pain.   Gastrointestinal:  Negative for abdominal pain, constipation, diarrhea, nausea and vomiting.   Genitourinary:  Negative for dysuria and hematuria.   Musculoskeletal:  Positive for arthralgias.   Skin:  Positive for color change and wound.   Neurological:  Negative for speech difficulty, weakness and headaches.   Psychiatric/Behavioral:  Positive for behavioral problems. Negative for agitation, confusion and suicidal ideas. The patient is nervous/anxious.    Objective:     Weight: 114.1 kg (251 lb 8.7 oz)  Body mass index is 41.86 kg/m².    Physical Exam  Vitals and nursing note reviewed.   Constitutional:       General: She is not in acute distress.     Appearance: She is obese. She is not toxic-appearing.   HENT:      Head: Normocephalic and atraumatic.      Nose: Nose normal.      Mouth/Throat:      Mouth: Mucous membranes are moist.   Eyes:      Pupils: Pupils are equal, round, and reactive to light.   Cardiovascular:      Rate and Rhythm: Normal rate and regular rhythm.      Pulses: Normal pulses.      Heart sounds: Normal heart sounds.   Pulmonary:      Effort: Pulmonary effort is normal.      Breath sounds: Normal breath sounds.   Abdominal:      General: Bowel sounds are normal.      Palpations: Abdomen is soft.      Tenderness: There is abdominal tenderness (lower abdomen).   Musculoskeletal:         General: Normal range of motion.      Cervical back: Normal range of motion.      Right lower leg: Edema present.      Left  lower leg: Edema present.   Skin:     General: Skin is warm and dry.      Comments: Both feet bandaged   Neurological:      Mental Status: She is alert and oriented to person, place, and time.   Psychiatric:         Behavior: Behavior normal.         Thought Content: Thought content normal.       Significant Labs: All pertinent labs within the past 24 hours have been reviewed.    Significant Imaging: I have reviewed all pertinent imaging results/findings within the past 24 hours.

## 2022-04-26 NOTE — NURSING
Pt with no IV access, refusing re-insertion, MD aware, unable to administer scheduled dose of IV vancomycin

## 2022-04-26 NOTE — PLAN OF CARE
VN note: VN completed AVS and attachments and notified bedside nurseKerri. Will cont to be available and intervene prn.

## 2022-04-26 NOTE — ASSESSMENT & PLAN NOTE
Bipolar 1 disorder    Per Psychiatry note,   - Patient is now in a Judicial Commitment Petition Filed Status (filed on 04/21/2022) due to continued grave disability 2/2 mental illness at this time (will have court paperwork scanned into chart once received from the hospital ).    - Once medically cleared / stable, seek transfer back to [facility] (or another inpatient psychiatric facility; patient may need Med-Psych placement) for continued mental health treatment / stabilization.  - Increase Risperdal to 3 mg PO BID and Depakote ER 500 mg PO QAM and 1250 mg PO QHS for Bipolar I Disorder and insomnia   - Continue Vistaril 50 mg PO TID (change to scheduled) for anxiety and/or insomnia   - HOLD previously outpt prescribed Vyvanse  - Can use Zyprexa 10 mg PO/IM q8 hours PRN for non-redirectable psychotic / manic agitation   - Continue suicide / violence precautions and continue to monitor the patient with a sitter while on a PEC / CEC / CHANCE filed status.

## 2022-04-26 NOTE — PROGRESS NOTES
[facility] Medicine  Progress Note    Patient Name: Audrey Natarajan  MRN: [id number]  Patient Class: IP- Inpatient   Admission Date: 4/12/2022  Length of Stay: 8 days  Attending Physician: Augusto May MD  Primary Care Provider: Donaldo Pena MD        Subjective:     Principal Problem:Diabetic foot ulcer associated with type 2 diabetes mellitus        HPI:  Audrey Natarajan is a 50 yo female with a pmh of DM2, bipolar 1 disorder, cellulitis, COPD, HTN, CAD, DVT/PE. She presented to the ED with c/o fevers x 1 day. She also has BLE swelling and pain and wounds to both feet, worsening x 2 weeks. She had fever up to 102F yesterday. She reports the wounds to her right foot are from dragging her feet while she was angry. Denies drainage to foot wounds. She has had blood clots in the past and has an IVC filter placed in 2012. She was sent here from [facility] where she was under CEC for psychosis. ED workup revealed BLE DVT on venous US, WBC 31, and Hgb 9.9. She received a dose of vancomycin while in the ED.       Overview/Hospital Course:  Admitted for diabetic foot infection, cellulitis, and DVT. Patient is now in a Judicial Commitment Petition Filed Status (filed on 04/21/2022) due to continued grave disability 2/2 mental illness at this time (will have court paperwork scanned into chart once received from the hospital ). History of DVT/PE, hypercoagulable state, IVC filter in place. Therapeutic lovenox initiated, transitioned to eliquis. Podiatry consulted for diabetic foot ulcer, debrided on 4/13 with cultures obtained - growing MRSA. Started on IV Clindamycin, switched to bactrim with flagyl, with further discontinuation of bactrim and initiation of vancomycin. ID consulted to follow for antibiotic regimen. Psych consulted for medication evaluation, pt with CEC from behavioral health facility. Now a Judicial Commitment Petition Filed Status (filed 4/21) due to  continued grave disability 2/2 mental illness at this time (will have court paperwork scanned into chart once received from the hospital ) facility. Developed ODESSA with increase in Cr to 2.4 as well as hyperkalemia, nephrology consulted for assistance in management. ODESSA and hyperkalemia have since resolved (4/25). Renal US showed minimally dilated central renal collecting system on the left and mildly elevated resistive indices, findings which may be seen in setting of medical renal disease. Heme/onc consulted given iliofemoral lymphadenopathy, possibly reactive or neoplastic identified on CT abd/pelvis on 4/19, likely secondary to panniculitis, no further work up needed. Patient discharged in stable condition to inpatient psych facility with two week course (end date 5/4) of doxycycline and metronidazole with podiatry and PCP follow up.      Interval History: VSS. Leukocytosis and thrombocytosis at baseline. D/C with two week course of doxycycline and flagyl. Stable for discharge pending placement    Review of Systems   Constitutional:  Negative for appetite change and fever.   HENT:  Negative for congestion, rhinorrhea and sore throat.    Eyes:  Negative for photophobia and visual disturbance.   Respiratory:  Negative for cough and shortness of breath.    Cardiovascular:  Positive for leg swelling. Negative for chest pain.   Gastrointestinal:  Negative for abdominal pain, constipation, diarrhea, nausea and vomiting.   Genitourinary:  Negative for dysuria and hematuria.   Musculoskeletal:  Positive for arthralgias.   Skin:  Positive for color change and wound.   Neurological:  Negative for speech difficulty, weakness and headaches.   Psychiatric/Behavioral:  Positive for behavioral problems. Negative for agitation, confusion and suicidal ideas. The patient is nervous/anxious.    Objective:     Weight: 114.1 kg (251 lb 8.7 oz)  Body mass index is 41.86 kg/m².    Physical Exam  Vitals and nursing note  reviewed.   Constitutional:       General: She is not in acute distress.     Appearance: She is obese. She is not toxic-appearing.   HENT:      Head: Normocephalic and atraumatic.      Nose: Nose normal.      Mouth/Throat:      Mouth: Mucous membranes are moist.   Eyes:      Pupils: Pupils are equal, round, and reactive to light.   Cardiovascular:      Rate and Rhythm: Normal rate and regular rhythm.      Pulses: Normal pulses.      Heart sounds: Normal heart sounds.   Pulmonary:      Effort: Pulmonary effort is normal.      Breath sounds: Normal breath sounds.   Abdominal:      General: Bowel sounds are normal.      Palpations: Abdomen is soft.      Tenderness: There is abdominal tenderness (lower abdomen).   Musculoskeletal:         General: Normal range of motion.      Cervical back: Normal range of motion.      Right lower leg: Edema present.      Left lower leg: Edema present.   Skin:     General: Skin is warm and dry.      Comments: Both feet bandaged   Neurological:      Mental Status: She is alert and oriented to person, place, and time.   Psychiatric:         Behavior: Behavior normal.         Thought Content: Thought content normal.       Significant Labs: All pertinent labs within the past 24 hours have been reviewed.    Significant Imaging: I have reviewed all pertinent imaging results/findings within the past 24 hours.      Assessment/Plan:      * Diabetic foot ulcer associated with type 2 diabetes mellitus  B/l foot ulcers  - Podiatry consulted,  - Xray in feet bilaterally, findings consistent with Charcot joint of left foot, no acute abnormality   - MRI ordered edema noted as well as charcot changes of left foot without evidence of osteomyelitis  - LLE ulcer debrided with deep cultures taken.   - Site dressed with xeroform and kerlix. Nursing orders in for daily dressing changes  - She is NWB to LLE due to ulcer and history of charcot foot.  - Will continue to follow  - Cont clindamycin - culture  growing Staph aureus--sensitive to clinda, switched   - Cultures grew out MRSA and anerobic bacteria; patient is septic today and rising WBC; switched clinda to bactrim and adding flagyl;       - Leukocytosis improving today, per Heme/onc patient baseline around 15 given asplenia  - D/C vancomycin upon discharge  - Per ID rec, 2 week course of flagyl and doxycyline  - Outpatient follow up with podiatry    Lymphadenopathy, inguinal  See Panniculitis    Hyperkalemia  Developed during admission, increased to 5.4 and to 5.7    - Resolved prior to discharge  - Given Lokelma  - Nephrology folowing  - Renal US completed to rule out obstruction    Panniculitis  Lymphadenopathy, inguinal  Identified on CT abdomen pelvis on 4/19, no underlying hematoma or abscess    - Heme/Onc consulted, recommendations as follows  - likely secondary to Panniculitis  - no further workup needed    ODESSA (acute kidney injury)  No history of CKD  Creatinine baseline around 0.6-0.7, developed during admission likely secondary to bactrim use, increased to 2.4 and trended down to 1.0  US kidney showed renal US showed minimally dilated central renal collecting system on the left and mildly elevated resistive indices, findings which may be seen in setting of medical renal disease    - Currently resolved  - Daily Renal Function Panel  - Check Vanc levels before each dose  - Need strict I&Os  - Hold off on serological work up for now,  get basic pending labs today   - No Indication for RRT at this time, K+ acceptable, No Uremia symptoms.  - Avoid Hypotension.  - Renally dose all meds  - Please avoid nephrotoxins, including NSAIDs, aminoglycosides, IV contrast (unless absolutely necessary), gadolinium, fleets and other phosphorous-based laxatives. Caution with antibiotics    Sepsis due to methicillin resistant Staphylococcus aureus (MRSA)  - see principle problem    Anemia  Denies bleeding, baseline 12-13, 9.9 on admit  Iron panel: Iron 13, TIBC 456, Sat  Iron 3, Transferrin and Ferritin WNL    - Iron supplement  - Continue to monitor with daily CBC    Acute deep vein thrombosis (DVT) of both lower extremities  Hx of DVT/PE, hypercoagulable state, IVC filter in place  BLE venous US with thrombosis of bilateral posterior tibial veins  Initially started on therapeutic lovenox    - Continue Eliquis    SHAWN (obstructive sleep apnea)  Does not use CPAP    Psychosis  Bipolar 1 disorder    Per Psychiatry note,   - Patient is now in a Judicial Commitment Petition Filed Status (filed on 04/21/2022) due to continued grave disability 2/2 mental illness at this time (will have court paperwork scanned into chart once received from the hospital ).    - Once medically cleared / stable, seek transfer back to [facility] (or another inpatient psychiatric facility; patient may need Med-Psych placement) for continued mental health treatment / stabilization.  - Increase Risperdal to 3 mg PO BID and Depakote ER 500 mg PO QAM and 1250 mg PO QHS for Bipolar I Disorder and insomnia   - Continue Vistaril 50 mg PO TID (change to scheduled) for anxiety and/or insomnia   - HOLD previously outpt prescribed Vyvanse  - Can use Zyprexa 10 mg PO/IM q8 hours PRN for non-redirectable psychotic / manic agitation   - Continue suicide / violence precautions and continue to monitor the patient with a sitter while on a PEC / CEC / CHANCE filed status.     Cellulitis of lower extremity  See principle problem     Thrombocytosis  Elevated platelets and increasing since admission, likely secondary to sepsis  Reports history of chronic thrombocytosis which is reactive and secondary to history of splenectomy    Heme/Onc consulted, recommendations as follows  -hence no further workup needed  -okay to monitor platelet count once every 3 to 4 days    Bipolar 1 disorder  See psychosis    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  A1C 7.0  -Hold metformin  -accuchecks and MDSSI  -diabetic diet  -cont  neurontin  Currently at goal for hospitalization    COPD (chronic obstructive pulmonary disease)  Cont advair inhaler  duonebs PRN    Essential hypertension  Chronic, controlled.  Latest blood pressure and vitals reviewed-   Temp:  [96.2 °F (35.7 °C)-99 °F (37.2 °C)]   Pulse:  [73-94]   Resp:  [18]   BP: (126-149)/(59-66)   SpO2:  [95 %-100 %] .   Home meds for hypertension were reviewed and noted below.   Hypertension Medications             furosemide (LASIX) 20 MG tablet TAKE 1 TABLET(20 MG) BY MOUTH EVERY DAY    lisinopriL 10 MG tablet Take 1 tablet (10 mg total) by mouth once daily.    metoprolol tartrate (LOPRESSOR) 50 MG tablet TAKE 1 TABLET(50 MG) BY MOUTH TWICE DAILY        While in the hospital, will manage blood pressure as follows; Adjust home antihypertensive regimen as follows- Hold lisinopril and lasix in the setting of ODESSA, resume lopressor    Will utilize p.r.n. blood pressure medication only if patient's blood pressure greater than  180/110 and she develops symptoms such as worsening chest pain or shortness of breath.      VTE Risk Mitigation (From admission, onward)         Ordered     IP VTE HIGH RISK PATIENT  Once         04/13/22 0246     Place sequential compression device  Until discontinued         04/13/22 0246                Discharge Planning   HUMBERTO: 4/25/2022     Code Status: Full Code   Is the patient medically ready for discharge?:     Reason for patient still in hospital (select all that apply): Pending disposition  Discharge Plan A: Psychiatric hospital   Discharge Delays: (!) Other (PFC request initiated for IP Psych placement.)              Aniya Ramirez PA-C  Department of [facility] Medicine   [facility]

## 2022-04-26 NOTE — SUBJECTIVE & OBJECTIVE
Interval History: Stable for discharge. Patient unable to be accepted by inpatient psych facility as a result of contact precautions for MRSA.     Review of Systems   Constitutional:  Negative for appetite change and fever.   HENT:  Negative for congestion, rhinorrhea and sore throat.    Eyes:  Negative for photophobia and visual disturbance.   Respiratory:  Negative for cough and shortness of breath.    Cardiovascular:  Positive for leg swelling. Negative for chest pain.   Gastrointestinal:  Negative for abdominal pain, constipation, diarrhea, nausea and vomiting.   Genitourinary:  Negative for dysuria and hematuria.   Musculoskeletal:  Positive for arthralgias.   Skin:  Positive for color change and wound.   Neurological:  Negative for speech difficulty, weakness and headaches.   Psychiatric/Behavioral:  Positive for behavioral problems. Negative for agitation, confusion and suicidal ideas.    Objective:     Vital Signs (Most Recent):  Temp: 97.5 °F (36.4 °C) (04/26/22 1117)  Pulse: 77 (04/26/22 1117)  Resp: 18 (04/26/22 1117)  BP: 132/64 (04/26/22 1117)  SpO2: 98 % (04/26/22 1117) Vital Signs (24h Range):  Temp:  [96.2 °F (35.7 °C)-99 °F (37.2 °C)] 97.5 °F (36.4 °C)  Pulse:  [73-94] 77  Resp:  [18] 18  SpO2:  [95 %-100 %] 98 %  BP: (126-149)/(59-66) 132/64     Weight: 114.1 kg (251 lb 8.7 oz)  Body mass index is 41.86 kg/m².    Intake/Output Summary (Last 24 hours) at 4/26/2022 1215  Last data filed at 4/26/2022 0400  Gross per 24 hour   Intake --   Output 2000 ml   Net -2000 ml      Physical Exam  Vitals and nursing note reviewed.   Constitutional:       General: She is not in acute distress.     Appearance: She is obese. She is not toxic-appearing.   HENT:      Head: Normocephalic and atraumatic.      Nose: Nose normal.      Mouth/Throat:      Mouth: Mucous membranes are moist.   Eyes:      Pupils: Pupils are equal, round, and reactive to light.   Cardiovascular:      Rate and Rhythm: Normal rate and regular  rhythm.      Pulses: Normal pulses.      Heart sounds: Normal heart sounds.   Pulmonary:      Effort: Pulmonary effort is normal.      Breath sounds: Normal breath sounds.   Abdominal:      General: Bowel sounds are normal.      Palpations: Abdomen is soft.      Tenderness: There is abdominal tenderness (lower abdomen).   Musculoskeletal:         General: Normal range of motion.      Cervical back: Normal range of motion.      Right lower leg: Edema present.      Left lower leg: Edema present.   Skin:     General: Skin is warm and dry.      Comments: Both feet bandaged   Neurological:      Mental Status: She is alert and oriented to person, place, and time.   Psychiatric:         Behavior: Behavior normal.         Thought Content: Thought content normal.       Significant Labs: All pertinent labs within the past 24 hours have been reviewed.  BMP:   Recent Labs   Lab 04/25/22  0727   *   *   K 5.0   CL 93*   CO2 28   BUN 15   CREATININE 0.7   CALCIUM 9.4   MG 1.7     CBC:   Recent Labs   Lab 04/25/22  0727 04/26/22  0344   WBC 15.92* 13.21*   HGB 9.2* 9.7*   HCT 27.8* 30.8*   * 618*     CMP:   Recent Labs   Lab 04/25/22  0727   *   K 5.0   CL 93*   CO2 28   *   BUN 15   CREATININE 0.7   CALCIUM 9.4   ALBUMIN 2.8*   ANIONGAP 9   EGFRNONAA >60       Significant Imaging: I have reviewed all pertinent imaging results/findings within the past 24 hours.

## 2022-04-26 NOTE — PLAN OF CARE
Patient is medically ready for discharge, however, still unable to find accepting IP Psych Facility. MD team aware patient's isolation status and wound care barriers to discharge. [facility] working on placement as well. No accepting facilities as of yet. I did inform patient's bedside nurse Kerri original PEC/CEC placed in transfer packet and copy of PEC/CEC is in blue folder. TN will continue to follow.    1131--TN spoke with Barb at the [facility]. She confirmed they are still working on placement for patient. However, still having issues placing patient since she has MRSA and wound care. I told her would be available as needed too. TN updated MD team.    Per Psych Note 4/25- Patient is now in a Judicial Commitment Petition Filed Status (filed on 04/21/2022) due to continued grave disability 2/2 mental illness at this time (I am reaching back out to the hospital  today to request a signed copy of the CHANCE petition filed on 04/21/2022).      9325--TN met with patient. Patient's daughter-in-law at bedside. Patient stated ok to speak in front of her. I explained to her still waiting on IP Psych facility that could accommodate patient. Patient asking to go home. I informed her MD still thinks she needs IP Psych placement. Patient informed TN she was living at home alone prior to her IP Psych stay at [facility]. She had family nearby who could help as needed. She was independent prior. Patient is working with [employer] to get a handicap trailer. I told patient would update her as soon as I hear something regarding placement. TN will continue to follow.    Patient Contacts    Name Relation Home Work Mobile   Anitra Cain Sister [phone]     Tabitha Man Other [phone]     Catia Weathers Mother   [phone]   Ana Lopez Daughter   [phone]       Future Appointments   Date Time Provider Department Center   4/28/2022  2:00 PM Donaldo Pena MD [facility] [place]   4/28/2022  2:30 PM [place] [facility] [place] [place]   5/9/2022  2:00 PM Saloni De Anda MD [facility] [place] [facility]   5/26/2022  3:00 PM Maira De Los Santos DPM [facility] [place]         04/26/22 1108   Post-Acute Status   Post-Acute Authorization Placement   Post-Acute Placement Status Referrals Sent   Hospital Resources/Appts/Education Provided Appointments scheduled by Navigator/Coordinator   Discharge Delays   (Pending accepting IP Psych Facility.)   Discharge Plan   Discharge Plan A Psychiatric hospital     Sammi Root RN    [phone]

## 2022-04-26 NOTE — PROGRESS NOTES
[facility]                   Pharmacy  Pharmacy Vancomycin/AG Sign-off    Therapy with vancomycin completed and/or consult discontinued by provider.  Pharmacy will sign off, please re-consult as needed. Thank you for allowing us to participate in this patient's care.     Joseph Arredondo, PharmD  [phone]

## 2022-04-27 ENCOUNTER — PATIENT OUTREACH (OUTPATIENT)
Dept: ADMINISTRATIVE | Facility: CLINIC | Age: 52
End: 2022-04-27
Payer: MEDICAID

## 2022-04-27 DIAGNOSIS — E11.621 DIABETIC FOOT ULCER ASSOCIATED WITH TYPE 2 DIABETES MELLITUS, UNSPECIFIED LATERALITY, UNSPECIFIED PART OF FOOT, UNSPECIFIED ULCER STAGE: Primary | ICD-10-CM

## 2022-04-27 DIAGNOSIS — L97.509 DIABETIC FOOT ULCER ASSOCIATED WITH TYPE 2 DIABETES MELLITUS, UNSPECIFIED LATERALITY, UNSPECIFIED PART OF FOOT, UNSPECIFIED ULCER STAGE: Primary | ICD-10-CM

## 2022-04-27 NOTE — PROGRESS NOTES
C3 nurse spoke with Audrey Natarajan for a TCC post hospital discharge follow up call. The patient has a scheduled HOSFU appointment with Dr. Pena on 4/28/2022 @ 2:00 pm. OPCM referral submitted.

## 2022-04-29 ENCOUNTER — TELEPHONE (OUTPATIENT)
Dept: FAMILY MEDICINE | Facility: CLINIC | Age: 52
End: 2022-04-29
Payer: MEDICAID

## 2022-04-29 NOTE — TELEPHONE ENCOUNTER
----- Message from Lorraine Scott sent at 4/29/2022  1:01 PM CDT -----  Type: Patient Call Back    Who called: self     What is the request in detail: requesting a call bk     Can the clinic reply by [facility]?    Would the patient rather a call back or a response via [facility]? Call     Best call back number: [phone] (home) [phone] (work)

## 2022-05-02 ENCOUNTER — TELEPHONE (OUTPATIENT)
Dept: SMOKING CESSATION | Facility: CLINIC | Age: 52
End: 2022-05-02
Payer: MEDICAID

## 2022-05-02 ENCOUNTER — PATIENT OUTREACH (OUTPATIENT)
Dept: ADMINISTRATIVE | Facility: OTHER | Age: 52
End: 2022-05-02
Payer: MEDICAID

## 2022-05-02 ENCOUNTER — TELEPHONE (OUTPATIENT)
Dept: PODIATRY | Facility: CLINIC | Age: 52
End: 2022-05-02
Payer: MEDICAID

## 2022-05-02 NOTE — TELEPHONE ENCOUNTER
Called the pt and offered a  Appointment for tomorrow and she declined, I scheduled her for 5/4/22 which is 2 days away. She will try to get a ride if not she will call to reschedule.    Ronna SHIPMAN    ----- Message from Charlene Connelly sent at 5/2/2022  9:28 AM CDT -----  Regarding: Self [phone]  Type:  Sooner Appointment Request    Patient is requesting a sooner appointment.  Patient declined first available appointment listed as well as another facility and provider .  Patient will not accept being placed on the waitlist and is requesting a message be sent to doctor.    Name of Caller:  self    When is the first available appointment?  Denied    Symptoms:  Patient has wounds on the bottom of her feet and it looks infected.     Would the patient rather a call back or a response via [facility]?  Call back    Best Call Back Number: [phone]

## 2022-05-02 NOTE — PROGRESS NOTES
Health Maintenance Due   Topic Date Due    COVID-19 Vaccine (1) Never done    Colorectal Cancer Screening  Never done    Pneumococcal Vaccines (Age 0-64) (2 - PCV) 10/24/2018    Diabetes Urine Screening  11/06/2018    Eye Exam  12/03/2020    Lipid Panel  04/19/2022     Updates were requested from care everywhere.  Chart was reviewed for overdue Proactive [facility] Encounters ([facility]OB[facility]) topics (CRS, Breast Cancer Screening, Eye exam)  Health Maintenance has been updated.  LINKS immunization registry triggered.  Immunizations were reconciled.

## 2022-05-02 NOTE — TELEPHONE ENCOUNTER
Smoking Cessation counselor attempted to contact patient regarding no show appointment for intake visit, but patient did not answer.  Counselor was unable to leave a message, patient's voicemail box was full.      Carley Palomo RRT,MSW,LMSW,TTS  [phone]

## 2022-05-03 ENCOUNTER — TELEPHONE (OUTPATIENT)
Dept: SMOKING CESSATION | Facility: CLINIC | Age: 52
End: 2022-05-03
Payer: MEDICAID

## 2022-05-03 NOTE — TELEPHONE ENCOUNTER
Smoking Cessation counselor attempted to contact patient regarding no show appointment for intake visit, but patient reported she is unable to reschedule appointment due to familial issues.  Counselor provided patient with rescheduling information to reschedule appointment at her convenience.     Carley Palomo RRT,MSW,LMSW,TTS  [phone]

## 2022-05-04 ENCOUNTER — HOSPITAL ENCOUNTER (INPATIENT)
Facility: HOSPITAL | Age: 52
LOS: 9 days | Discharge: NURSING, REHAB OR LONG-TERM CARE | DRG: 854 | End: 2022-05-13
Attending: EMERGENCY MEDICINE | Admitting: EMERGENCY MEDICINE
Payer: MEDICAID

## 2022-05-04 ENCOUNTER — OFFICE VISIT (OUTPATIENT)
Dept: PODIATRY | Facility: CLINIC | Age: 52
DRG: 854 | End: 2022-05-04
Payer: MEDICAID

## 2022-05-04 VITALS — WEIGHT: 251.56 LBS | HEIGHT: 65 IN | BODY MASS INDEX: 41.91 KG/M2

## 2022-05-04 DIAGNOSIS — E11.621 DIABETIC ULCER OF OTHER PART OF FOOT ASSOCIATED WITH TYPE 2 DIABETES MELLITUS, WITH FAT LAYER EXPOSED, UNSPECIFIED LATERALITY: ICD-10-CM

## 2022-05-04 DIAGNOSIS — R07.9 CHEST PAIN, UNSPECIFIED TYPE: ICD-10-CM

## 2022-05-04 DIAGNOSIS — Z86.31 HISTORY OF DIABETIC ULCER OF FOOT: ICD-10-CM

## 2022-05-04 DIAGNOSIS — D68.59 HYPERCOAGULABLE STATE: ICD-10-CM

## 2022-05-04 DIAGNOSIS — L97.509 DIABETIC FOOT ULCER ASSOCIATED WITH TYPE 2 DIABETES MELLITUS: ICD-10-CM

## 2022-05-04 DIAGNOSIS — D64.9 ANEMIA, UNSPECIFIED TYPE: ICD-10-CM

## 2022-05-04 DIAGNOSIS — M86.179 OTHER ACUTE OSTEOMYELITIS, UNSPECIFIED ANKLE AND FOOT: Primary | ICD-10-CM

## 2022-05-04 DIAGNOSIS — E11.621 DIABETIC FOOT ULCER ASSOCIATED WITH TYPE 2 DIABETES MELLITUS: ICD-10-CM

## 2022-05-04 DIAGNOSIS — T14.8XXA WOUND INFECTION: ICD-10-CM

## 2022-05-04 DIAGNOSIS — D72.829 LEUKOCYTOSIS, UNSPECIFIED TYPE: ICD-10-CM

## 2022-05-04 DIAGNOSIS — L03.90 CELLULITIS: ICD-10-CM

## 2022-05-04 DIAGNOSIS — L08.9 WOUND INFECTION: ICD-10-CM

## 2022-05-04 DIAGNOSIS — I82.4Y3 ACUTE DEEP VEIN THROMBOSIS (DVT) OF PROXIMAL VEIN OF BOTH LOWER EXTREMITIES: ICD-10-CM

## 2022-05-04 DIAGNOSIS — L03.116 CELLULITIS OF LEFT LOWER EXTREMITY: ICD-10-CM

## 2022-05-04 DIAGNOSIS — M86.071 ACUTE HEMATOGENOUS OSTEOMYELITIS OF RIGHT FOOT: ICD-10-CM

## 2022-05-04 DIAGNOSIS — M14.672 CHARCOT'S JOINT OF LEFT FOOT: ICD-10-CM

## 2022-05-04 DIAGNOSIS — J42 CHRONIC BRONCHITIS, UNSPECIFIED CHRONIC BRONCHITIS TYPE: Chronic | ICD-10-CM

## 2022-05-04 DIAGNOSIS — L03.115 CELLULITIS OF BOTH FEET: ICD-10-CM

## 2022-05-04 DIAGNOSIS — F31.9 BIPOLAR 1 DISORDER: Chronic | ICD-10-CM

## 2022-05-04 DIAGNOSIS — E87.1 HYPONATREMIA: ICD-10-CM

## 2022-05-04 DIAGNOSIS — L03.116 CELLULITIS OF BOTH FEET: ICD-10-CM

## 2022-05-04 DIAGNOSIS — A41.02 SEPSIS DUE TO METHICILLIN RESISTANT STAPHYLOCOCCUS AUREUS (MRSA): ICD-10-CM

## 2022-05-04 DIAGNOSIS — B99.9 INFECTION: ICD-10-CM

## 2022-05-04 DIAGNOSIS — B95.8 BACTEREMIA DUE TO STAPHYLOCOCCUS: ICD-10-CM

## 2022-05-04 DIAGNOSIS — L97.512 RIGHT FOOT ULCER, WITH FAT LAYER EXPOSED: ICD-10-CM

## 2022-05-04 DIAGNOSIS — R78.81 BACTEREMIA DUE TO STAPHYLOCOCCUS: ICD-10-CM

## 2022-05-04 DIAGNOSIS — L97.423 LEFT MIDFOOT ULCER, WITH NECROSIS OF MUSCLE: ICD-10-CM

## 2022-05-04 DIAGNOSIS — L97.502 DIABETIC ULCER OF OTHER PART OF FOOT ASSOCIATED WITH TYPE 2 DIABETES MELLITUS, WITH FAT LAYER EXPOSED, UNSPECIFIED LATERALITY: ICD-10-CM

## 2022-05-04 DIAGNOSIS — E11.49 TYPE II DIABETES MELLITUS WITH NEUROLOGICAL MANIFESTATIONS: Primary | ICD-10-CM

## 2022-05-04 DIAGNOSIS — R07.9 CHEST PAIN: ICD-10-CM

## 2022-05-04 LAB
ALBUMIN SERPL BCP-MCNC: 2.1 G/DL (ref 3.5–5.2)
ALP SERPL-CCNC: 136 U/L (ref 55–135)
ALT SERPL W/O P-5'-P-CCNC: 14 U/L (ref 10–44)
ANION GAP SERPL CALC-SCNC: 6 MMOL/L (ref 8–16)
ANION GAP SERPL CALC-SCNC: 8 MMOL/L (ref 8–16)
AST SERPL-CCNC: 18 U/L (ref 10–40)
BASOPHILS # BLD AUTO: 0.04 K/UL (ref 0–0.2)
BASOPHILS NFR BLD: 0.2 % (ref 0–1.9)
BILIRUB SERPL-MCNC: 0.3 MG/DL (ref 0.1–1)
BILIRUB UR QL STRIP: NEGATIVE
BNP SERPL-MCNC: 116 PG/ML (ref 0–99)
BUN SERPL-MCNC: 12 MG/DL (ref 6–20)
BUN SERPL-MCNC: 12 MG/DL (ref 6–20)
CALCIUM SERPL-MCNC: 7.7 MG/DL (ref 8.7–10.5)
CALCIUM SERPL-MCNC: 8 MG/DL (ref 8.7–10.5)
CHLORIDE SERPL-SCNC: 85 MMOL/L (ref 95–110)
CHLORIDE SERPL-SCNC: 86 MMOL/L (ref 95–110)
CLARITY UR: CLEAR
CO2 SERPL-SCNC: 24 MMOL/L (ref 23–29)
CO2 SERPL-SCNC: 25 MMOL/L (ref 23–29)
COLOR UR: YELLOW
CREAT SERPL-MCNC: 0.6 MG/DL (ref 0.5–1.4)
CREAT SERPL-MCNC: 0.7 MG/DL (ref 0.5–1.4)
CRP SERPL-MCNC: 182.2 MG/L (ref 0–8.2)
CTP QC/QA: YES
DIFFERENTIAL METHOD: ABNORMAL
EOSINOPHIL # BLD AUTO: 0 K/UL (ref 0–0.5)
EOSINOPHIL NFR BLD: 0 % (ref 0–8)
ERYTHROCYTE [DISTWIDTH] IN BLOOD BY AUTOMATED COUNT: 15.7 % (ref 11.5–14.5)
ERYTHROCYTE [SEDIMENTATION RATE] IN BLOOD BY WESTERGREN METHOD: 115 MM/HR (ref 0–20)
EST. GFR  (AFRICAN AMERICAN): >60 ML/MIN/1.73 M^2
EST. GFR  (AFRICAN AMERICAN): >60 ML/MIN/1.73 M^2
EST. GFR  (NON AFRICAN AMERICAN): >60 ML/MIN/1.73 M^2
EST. GFR  (NON AFRICAN AMERICAN): >60 ML/MIN/1.73 M^2
FERRITIN SERPL-MCNC: 100 NG/ML (ref 20–300)
GLUCOSE SERPL-MCNC: 125 MG/DL (ref 70–110)
GLUCOSE SERPL-MCNC: 147 MG/DL (ref 70–110)
GLUCOSE UR QL STRIP: NEGATIVE
HCT VFR BLD AUTO: 21.3 % (ref 37–48.5)
HGB BLD-MCNC: 7.3 G/DL (ref 12–16)
HGB UR QL STRIP: NEGATIVE
IMM GRANULOCYTES # BLD AUTO: 0.2 K/UL (ref 0–0.04)
IMM GRANULOCYTES NFR BLD AUTO: 1 % (ref 0–0.5)
IRON SERPL-MCNC: <10 UG/DL (ref 30–160)
KETONES UR QL STRIP: NEGATIVE
LACTATE SERPL-SCNC: 0.8 MMOL/L (ref 0.5–2.2)
LACTATE SERPL-SCNC: 0.8 MMOL/L (ref 0.5–2.2)
LEUKOCYTE ESTERASE UR QL STRIP: NEGATIVE
LYMPHOCYTES # BLD AUTO: 2 K/UL (ref 1–4.8)
LYMPHOCYTES NFR BLD: 9.6 % (ref 18–48)
MCH RBC QN AUTO: 27.9 PG (ref 27–31)
MCHC RBC AUTO-ENTMCNC: 34.3 G/DL (ref 32–36)
MCV RBC AUTO: 81 FL (ref 82–98)
MONOCYTES # BLD AUTO: 3.1 K/UL (ref 0.3–1)
MONOCYTES NFR BLD: 15.1 % (ref 4–15)
NEUTROPHILS # BLD AUTO: 15.2 K/UL (ref 1.8–7.7)
NEUTROPHILS NFR BLD: 74.1 % (ref 38–73)
NITRITE UR QL STRIP: NEGATIVE
NRBC BLD-RTO: 0 /100 WBC
PH UR STRIP: 6 [PH] (ref 5–8)
PLATELET # BLD AUTO: 628 K/UL (ref 150–450)
PLATELET BLD QL SMEAR: ABNORMAL
PMV BLD AUTO: 9 FL (ref 9.2–12.9)
POCT GLUCOSE: 111 MG/DL (ref 70–110)
POCT GLUCOSE: 118 MG/DL (ref 70–110)
POTASSIUM SERPL-SCNC: 5 MMOL/L (ref 3.5–5.1)
POTASSIUM SERPL-SCNC: 5.1 MMOL/L (ref 3.5–5.1)
PROCALCITONIN SERPL IA-MCNC: 0.34 NG/ML
PROT SERPL-MCNC: 6.2 G/DL (ref 6–8.4)
PROT UR QL STRIP: ABNORMAL
RBC # BLD AUTO: 2.62 M/UL (ref 4–5.4)
SARS-COV-2 RDRP RESP QL NAA+PROBE: NEGATIVE
SATURATED IRON: ABNORMAL % (ref 20–50)
SODIUM SERPL-SCNC: 116 MMOL/L (ref 136–145)
SODIUM SERPL-SCNC: 118 MMOL/L (ref 136–145)
SODIUM UR-SCNC: <20 MMOL/L (ref 20–250)
SP GR UR STRIP: 1.01 (ref 1–1.03)
TOTAL IRON BINDING CAPACITY: 235 UG/DL (ref 250–450)
TRANSFERRIN SERPL-MCNC: 159 MG/DL (ref 200–375)
URN SPEC COLLECT METH UR: ABNORMAL
UROBILINOGEN UR STRIP-ACNC: NEGATIVE EU/DL
WBC # BLD AUTO: 20.54 K/UL (ref 3.9–12.7)

## 2022-05-04 PROCEDURE — 83935 ASSAY OF URINE OSMOLALITY: CPT | Performed by: PHYSICIAN ASSISTANT

## 2022-05-04 PROCEDURE — 51701 INSERT BLADDER CATHETER: CPT

## 2022-05-04 PROCEDURE — 93010 EKG 12-LEAD: ICD-10-PCS | Mod: ,,, | Performed by: INTERNAL MEDICINE

## 2022-05-04 PROCEDURE — U0002 COVID-19 LAB TEST NON-CDC: HCPCS | Performed by: EMERGENCY MEDICINE

## 2022-05-04 PROCEDURE — 63600175 PHARM REV CODE 636 W HCPCS: Performed by: EMERGENCY MEDICINE

## 2022-05-04 PROCEDURE — 99223 PR INITIAL HOSPITAL CARE,LEVL III: ICD-10-PCS | Mod: ,,, | Performed by: NURSE PRACTITIONER

## 2022-05-04 PROCEDURE — 99214 OFFICE O/P EST MOD 30 MIN: CPT | Mod: PBBFAC,25,PO | Performed by: PODIATRIST

## 2022-05-04 PROCEDURE — 85025 COMPLETE CBC W/AUTO DIFF WBC: CPT | Performed by: EMERGENCY MEDICINE

## 2022-05-04 PROCEDURE — 99223 1ST HOSP IP/OBS HIGH 75: CPT | Mod: ,,, | Performed by: NURSE PRACTITIONER

## 2022-05-04 PROCEDURE — 84145 PROCALCITONIN (PCT): CPT | Performed by: EMERGENCY MEDICINE

## 2022-05-04 PROCEDURE — 63600175 PHARM REV CODE 636 W HCPCS: Performed by: STUDENT IN AN ORGANIZED HEALTH CARE EDUCATION/TRAINING PROGRAM

## 2022-05-04 PROCEDURE — 99999 PR PBB SHADOW E&M-EST. PATIENT-LVL IV: ICD-10-PCS | Mod: PBBFAC,,, | Performed by: PODIATRIST

## 2022-05-04 PROCEDURE — 83930 ASSAY OF BLOOD OSMOLALITY: CPT | Performed by: STUDENT IN AN ORGANIZED HEALTH CARE EDUCATION/TRAINING PROGRAM

## 2022-05-04 PROCEDURE — 80048 BASIC METABOLIC PNL TOTAL CA: CPT | Mod: 91,XB | Performed by: STUDENT IN AN ORGANIZED HEALTH CARE EDUCATION/TRAINING PROGRAM

## 2022-05-04 PROCEDURE — 25000003 PHARM REV CODE 250: Performed by: EMERGENCY MEDICINE

## 2022-05-04 PROCEDURE — 3008F BODY MASS INDEX DOCD: CPT | Mod: CPTII,,, | Performed by: PODIATRIST

## 2022-05-04 PROCEDURE — 21400001 HC TELEMETRY ROOM

## 2022-05-04 PROCEDURE — 82728 ASSAY OF FERRITIN: CPT | Performed by: STUDENT IN AN ORGANIZED HEALTH CARE EDUCATION/TRAINING PROGRAM

## 2022-05-04 PROCEDURE — 3008F PR BODY MASS INDEX (BMI) DOCUMENTED: ICD-10-PCS | Mod: CPTII,,, | Performed by: PODIATRIST

## 2022-05-04 PROCEDURE — 3051F HG A1C>EQUAL 7.0%<8.0%: CPT | Mod: CPTII,,, | Performed by: PODIATRIST

## 2022-05-04 PROCEDURE — 1111F PR DISCHARGE MEDS RECONCILED W/ CURRENT OUTPATIENT MED LIST: ICD-10-PCS | Mod: CPTII,,, | Performed by: PODIATRIST

## 2022-05-04 PROCEDURE — 3051F PR MOST RECENT HEMOGLOBIN A1C LEVEL 7.0 - < 8.0%: ICD-10-PCS | Mod: CPTII,,, | Performed by: PODIATRIST

## 2022-05-04 PROCEDURE — 87070 CULTURE OTHR SPECIMN AEROBIC: CPT | Performed by: PODIATRIST

## 2022-05-04 PROCEDURE — 86140 C-REACTIVE PROTEIN: CPT | Performed by: EMERGENCY MEDICINE

## 2022-05-04 PROCEDURE — C9113 INJ PANTOPRAZOLE SODIUM, VIA: HCPCS | Performed by: STUDENT IN AN ORGANIZED HEALTH CARE EDUCATION/TRAINING PROGRAM

## 2022-05-04 PROCEDURE — 99499 NO LOS: ICD-10-PCS | Mod: S$PBB,,, | Performed by: PODIATRIST

## 2022-05-04 PROCEDURE — 84300 ASSAY OF URINE SODIUM: CPT | Performed by: STUDENT IN AN ORGANIZED HEALTH CARE EDUCATION/TRAINING PROGRAM

## 2022-05-04 PROCEDURE — 99223 PR INITIAL HOSPITAL CARE,LEVL III: ICD-10-PCS | Mod: ,,, | Performed by: PODIATRIST

## 2022-05-04 PROCEDURE — 80053 COMPREHEN METABOLIC PANEL: CPT | Performed by: EMERGENCY MEDICINE

## 2022-05-04 PROCEDURE — 84466 ASSAY OF TRANSFERRIN: CPT | Performed by: STUDENT IN AN ORGANIZED HEALTH CARE EDUCATION/TRAINING PROGRAM

## 2022-05-04 PROCEDURE — 4010F PR ACE/ARB THEARPY RXD/TAKEN: ICD-10-PCS | Mod: CPTII,,, | Performed by: PODIATRIST

## 2022-05-04 PROCEDURE — 4010F ACE/ARB THERAPY RXD/TAKEN: CPT | Mod: CPTII,,, | Performed by: PODIATRIST

## 2022-05-04 PROCEDURE — 1111F DSCHRG MED/CURRENT MED MERGE: CPT | Mod: CPTII,,, | Performed by: PODIATRIST

## 2022-05-04 PROCEDURE — 83605 ASSAY OF LACTIC ACID: CPT | Performed by: EMERGENCY MEDICINE

## 2022-05-04 PROCEDURE — 87077 CULTURE AEROBIC IDENTIFY: CPT | Mod: 59 | Performed by: PODIATRIST

## 2022-05-04 PROCEDURE — 81003 URINALYSIS AUTO W/O SCOPE: CPT | Performed by: EMERGENCY MEDICINE

## 2022-05-04 PROCEDURE — 1160F PR REVIEW ALL MEDS BY PRESCRIBER/CLIN PHARMACIST DOCUMENTED: ICD-10-PCS | Mod: CPTII,,, | Performed by: PODIATRIST

## 2022-05-04 PROCEDURE — 36415 COLL VENOUS BLD VENIPUNCTURE: CPT | Performed by: STUDENT IN AN ORGANIZED HEALTH CARE EDUCATION/TRAINING PROGRAM

## 2022-05-04 PROCEDURE — 93010 ELECTROCARDIOGRAM REPORT: CPT | Mod: ,,, | Performed by: INTERNAL MEDICINE

## 2022-05-04 PROCEDURE — 96365 THER/PROPH/DIAG IV INF INIT: CPT

## 2022-05-04 PROCEDURE — 96375 TX/PRO/DX INJ NEW DRUG ADDON: CPT

## 2022-05-04 PROCEDURE — 1160F RVW MEDS BY RX/DR IN RCRD: CPT | Mod: CPTII,,, | Performed by: PODIATRIST

## 2022-05-04 PROCEDURE — 83880 ASSAY OF NATRIURETIC PEPTIDE: CPT | Performed by: STUDENT IN AN ORGANIZED HEALTH CARE EDUCATION/TRAINING PROGRAM

## 2022-05-04 PROCEDURE — 87186 SC STD MICRODIL/AGAR DIL: CPT | Mod: 59 | Performed by: PODIATRIST

## 2022-05-04 PROCEDURE — 99285 EMERGENCY DEPT VISIT HI MDM: CPT | Mod: 25,27

## 2022-05-04 PROCEDURE — 1159F MED LIST DOCD IN RCRD: CPT | Mod: CPTII,,, | Performed by: PODIATRIST

## 2022-05-04 PROCEDURE — 1159F PR MEDICATION LIST DOCUMENTED IN MEDICAL RECORD: ICD-10-PCS | Mod: CPTII,,, | Performed by: PODIATRIST

## 2022-05-04 PROCEDURE — 99999 PR PBB SHADOW E&M-EST. PATIENT-LVL IV: CPT | Mod: PBBFAC,,, | Performed by: PODIATRIST

## 2022-05-04 PROCEDURE — 87040 BLOOD CULTURE FOR BACTERIA: CPT | Performed by: EMERGENCY MEDICINE

## 2022-05-04 PROCEDURE — 93005 ELECTROCARDIOGRAM TRACING: CPT

## 2022-05-04 PROCEDURE — 85652 RBC SED RATE AUTOMATED: CPT | Performed by: EMERGENCY MEDICINE

## 2022-05-04 PROCEDURE — 25000003 PHARM REV CODE 250: Performed by: STUDENT IN AN ORGANIZED HEALTH CARE EDUCATION/TRAINING PROGRAM

## 2022-05-04 PROCEDURE — 99499 UNLISTED E&M SERVICE: CPT | Mod: S$PBB,,, | Performed by: PODIATRIST

## 2022-05-04 PROCEDURE — 87077 CULTURE AEROBIC IDENTIFY: CPT | Mod: 59 | Performed by: EMERGENCY MEDICINE

## 2022-05-04 PROCEDURE — 99223 1ST HOSP IP/OBS HIGH 75: CPT | Mod: ,,, | Performed by: PODIATRIST

## 2022-05-04 PROCEDURE — 87186 SC STD MICRODIL/AGAR DIL: CPT | Mod: 59 | Performed by: EMERGENCY MEDICINE

## 2022-05-04 RX ORDER — SODIUM CHLORIDE 0.9 % (FLUSH) 0.9 %
10 SYRINGE (ML) INJECTION EVERY 12 HOURS PRN
Status: DISCONTINUED | OUTPATIENT
Start: 2022-05-04 | End: 2022-05-13

## 2022-05-04 RX ORDER — NAPROXEN SODIUM 220 MG/1
81 TABLET, FILM COATED ORAL DAILY
Status: DISCONTINUED | OUTPATIENT
Start: 2022-05-04 | End: 2022-05-13 | Stop reason: HOSPADM

## 2022-05-04 RX ORDER — RISPERIDONE 1 MG/1
2 TABLET ORAL DAILY
Status: DISCONTINUED | OUTPATIENT
Start: 2022-05-04 | End: 2022-05-13 | Stop reason: HOSPADM

## 2022-05-04 RX ORDER — IBUPROFEN 200 MG
24 TABLET ORAL
Status: DISCONTINUED | OUTPATIENT
Start: 2022-05-04 | End: 2022-05-04

## 2022-05-04 RX ORDER — NALOXONE HCL 0.4 MG/ML
0.02 VIAL (ML) INJECTION
Status: DISCONTINUED | OUTPATIENT
Start: 2022-05-04 | End: 2022-05-13 | Stop reason: HOSPADM

## 2022-05-04 RX ORDER — FLUTICASONE FUROATE AND VILANTEROL 100; 25 UG/1; UG/1
1 POWDER RESPIRATORY (INHALATION) DAILY
Refills: 2 | Status: DISCONTINUED | OUTPATIENT
Start: 2022-05-04 | End: 2022-05-13 | Stop reason: HOSPADM

## 2022-05-04 RX ORDER — IBUPROFEN 200 MG
16 TABLET ORAL
Status: DISCONTINUED | OUTPATIENT
Start: 2022-05-04 | End: 2022-05-13 | Stop reason: HOSPADM

## 2022-05-04 RX ORDER — HYDROXYZINE PAMOATE 25 MG/1
50 CAPSULE ORAL 3 TIMES DAILY
Status: DISCONTINUED | OUTPATIENT
Start: 2022-05-04 | End: 2022-05-13 | Stop reason: HOSPADM

## 2022-05-04 RX ORDER — RISPERIDONE 1 MG/1
3 TABLET ORAL NIGHTLY
Status: DISCONTINUED | OUTPATIENT
Start: 2022-05-04 | End: 2022-05-13 | Stop reason: HOSPADM

## 2022-05-04 RX ORDER — OLANZAPINE 10 MG/1
10 TABLET ORAL EVERY 8 HOURS PRN
Status: DISCONTINUED | OUTPATIENT
Start: 2022-05-04 | End: 2022-05-13 | Stop reason: HOSPADM

## 2022-05-04 RX ORDER — CARBAMAZEPINE 200 MG/1
200 TABLET ORAL 2 TIMES DAILY
Status: DISCONTINUED | OUTPATIENT
Start: 2022-05-04 | End: 2022-05-05

## 2022-05-04 RX ORDER — PANTOPRAZOLE SODIUM 40 MG/1
40 TABLET, DELAYED RELEASE ORAL DAILY
Status: DISCONTINUED | OUTPATIENT
Start: 2022-05-04 | End: 2022-05-04

## 2022-05-04 RX ORDER — PANTOPRAZOLE SODIUM 40 MG/10ML
40 INJECTION, POWDER, LYOPHILIZED, FOR SOLUTION INTRAVENOUS 2 TIMES DAILY
Status: DISCONTINUED | OUTPATIENT
Start: 2022-05-04 | End: 2022-05-07

## 2022-05-04 RX ORDER — IBUPROFEN 200 MG
24 TABLET ORAL
Status: DISCONTINUED | OUTPATIENT
Start: 2022-05-04 | End: 2022-05-13 | Stop reason: HOSPADM

## 2022-05-04 RX ORDER — PRAVASTATIN SODIUM 40 MG/1
40 TABLET ORAL NIGHTLY
Status: DISCONTINUED | OUTPATIENT
Start: 2022-05-04 | End: 2022-05-13 | Stop reason: HOSPADM

## 2022-05-04 RX ORDER — METOPROLOL TARTRATE 50 MG/1
50 TABLET ORAL 2 TIMES DAILY
Status: DISCONTINUED | OUTPATIENT
Start: 2022-05-04 | End: 2022-05-13 | Stop reason: HOSPADM

## 2022-05-04 RX ORDER — GLUCAGON 1 MG
1 KIT INJECTION
Status: DISCONTINUED | OUTPATIENT
Start: 2022-05-04 | End: 2022-05-13 | Stop reason: HOSPADM

## 2022-05-04 RX ORDER — INSULIN ASPART 100 [IU]/ML
0-5 INJECTION, SOLUTION INTRAVENOUS; SUBCUTANEOUS
Status: DISCONTINUED | OUTPATIENT
Start: 2022-05-04 | End: 2022-05-13 | Stop reason: HOSPADM

## 2022-05-04 RX ORDER — MUPIROCIN 20 MG/G
OINTMENT TOPICAL 2 TIMES DAILY
Status: DISPENSED | OUTPATIENT
Start: 2022-05-04 | End: 2022-05-09

## 2022-05-04 RX ORDER — NALOXONE HCL 0.4 MG/ML
0.02 VIAL (ML) INJECTION
Status: DISCONTINUED | OUTPATIENT
Start: 2022-05-04 | End: 2022-05-13

## 2022-05-04 RX ORDER — DIVALPROEX SODIUM 250 MG/1
1250 TABLET, DELAYED RELEASE ORAL NIGHTLY
Status: DISCONTINUED | OUTPATIENT
Start: 2022-05-04 | End: 2022-05-13 | Stop reason: HOSPADM

## 2022-05-04 RX ORDER — IBUPROFEN 200 MG
16 TABLET ORAL
Status: DISCONTINUED | OUTPATIENT
Start: 2022-05-04 | End: 2022-05-04

## 2022-05-04 RX ORDER — SODIUM CHLORIDE 9 MG/ML
INJECTION, SOLUTION INTRAVENOUS CONTINUOUS
Status: DISCONTINUED | OUTPATIENT
Start: 2022-05-04 | End: 2022-05-05

## 2022-05-04 RX ORDER — ACETAMINOPHEN 500 MG
1000 TABLET ORAL EVERY 6 HOURS PRN
Status: DISCONTINUED | OUTPATIENT
Start: 2022-05-04 | End: 2022-05-13 | Stop reason: HOSPADM

## 2022-05-04 RX ORDER — HYDROMORPHONE HYDROCHLORIDE 2 MG/ML
1 INJECTION, SOLUTION INTRAMUSCULAR; INTRAVENOUS; SUBCUTANEOUS
Status: COMPLETED | OUTPATIENT
Start: 2022-05-04 | End: 2022-05-04

## 2022-05-04 RX ORDER — OXYCODONE AND ACETAMINOPHEN 7.5; 325 MG/1; MG/1
1 TABLET ORAL EVERY 4 HOURS PRN
Status: DISCONTINUED | OUTPATIENT
Start: 2022-05-04 | End: 2022-05-13 | Stop reason: HOSPADM

## 2022-05-04 RX ORDER — DIVALPROEX SODIUM 250 MG/1
500 TABLET, DELAYED RELEASE ORAL DAILY
Status: DISCONTINUED | OUTPATIENT
Start: 2022-05-04 | End: 2022-05-13 | Stop reason: HOSPADM

## 2022-05-04 RX ADMIN — HYDROXYZINE PAMOATE 50 MG: 25 CAPSULE ORAL at 08:05

## 2022-05-04 RX ADMIN — RISPERIDONE 2 MG: 1 TABLET ORAL at 02:05

## 2022-05-04 RX ADMIN — VANCOMYCIN HYDROCHLORIDE 2000 MG: 10 INJECTION, POWDER, LYOPHILIZED, FOR SOLUTION INTRAVENOUS at 10:05

## 2022-05-04 RX ADMIN — PANTOPRAZOLE SODIUM 40 MG: 40 INJECTION, POWDER, FOR SOLUTION INTRAVENOUS at 08:05

## 2022-05-04 RX ADMIN — METOPROLOL TARTRATE 50 MG: 50 TABLET, FILM COATED ORAL at 02:05

## 2022-05-04 RX ADMIN — ASPIRIN 81 MG CHEWABLE TABLET 81 MG: 81 TABLET CHEWABLE at 02:05

## 2022-05-04 RX ADMIN — HYDROMORPHONE HYDROCHLORIDE 1 MG: 2 INJECTION INTRAMUSCULAR; INTRAVENOUS; SUBCUTANEOUS at 10:05

## 2022-05-04 RX ADMIN — SODIUM CHLORIDE: 0.9 INJECTION, SOLUTION INTRAVENOUS at 06:05

## 2022-05-04 RX ADMIN — VANCOMYCIN HYDROCHLORIDE 1750 MG: 10 INJECTION, POWDER, LYOPHILIZED, FOR SOLUTION INTRAVENOUS at 10:05

## 2022-05-04 RX ADMIN — DIVALPROEX SODIUM 1250 MG: 250 TABLET, DELAYED RELEASE ORAL at 08:05

## 2022-05-04 RX ADMIN — CARBAMAZEPINE 200 MG: 200 TABLET ORAL at 09:05

## 2022-05-04 RX ADMIN — METOPROLOL TARTRATE 50 MG: 50 TABLET, FILM COATED ORAL at 08:05

## 2022-05-04 RX ADMIN — DIVALPROEX SODIUM 500 MG: 250 TABLET, DELAYED RELEASE ORAL at 02:05

## 2022-05-04 RX ADMIN — PANTOPRAZOLE SODIUM 40 MG: 40 INJECTION, POWDER, FOR SOLUTION INTRAVENOUS at 02:05

## 2022-05-04 RX ADMIN — RISPERIDONE 3 MG: 1 TABLET ORAL at 08:05

## 2022-05-04 RX ADMIN — OXYCODONE AND ACETAMINOPHEN 1 TABLET: 7.5; 325 TABLET ORAL at 02:05

## 2022-05-04 RX ADMIN — PRAVASTATIN SODIUM 40 MG: 40 TABLET ORAL at 08:05

## 2022-05-04 RX ADMIN — MUPIROCIN: 20 OINTMENT TOPICAL at 08:05

## 2022-05-04 RX ADMIN — HYDROXYZINE PAMOATE 50 MG: 25 CAPSULE ORAL at 02:05

## 2022-05-04 RX ADMIN — DOXYCYCLINE 100 MG: 100 INJECTION, POWDER, LYOPHILIZED, FOR SOLUTION INTRAVENOUS at 02:05

## 2022-05-04 NOTE — ASSESSMENT & PLAN NOTE
Presents with a sodium of 118 which is new.  She is currently asymptomatic.  Hypotonic hyponatremia - holding carbamazepine thought appears she was not taking this  - check urine sodium, serum osmalality  - will give trial of NS given patient has infection  - monitor BMP q6h  - check BNP

## 2022-05-04 NOTE — ASSESSMENT & PLAN NOTE
Was recently diagnosed with a diabetic foot ulcer last month and was treated however has not been taking her medications at home and has worsening redness and swelling bilateral lower extremities.  Last culture growing MRSA.  She is on IV doxycycline and vancomycin for now.  Podiatry has been consulted for diabetic foot wound.

## 2022-05-04 NOTE — ED PROVIDER NOTES
Encounter Date: 5/4/2022    SCRIBE #1 NOTE: I, Liliana Meade, am scribing for, and in the presence of,  Marilu Ordoñez MD. I have scribed the following portions of the note - Other sections scribed: HPI, ROS, PE.       History     Chief Complaint   Patient presents with    Wound Infection     Pt seen by podiatrist this morning. And sent to ED for possible admission/surgery to right foot for non healing wound. Bandage and ortho shoe in place. Foul smelling odor noted. Subjective fever this morning. Took tylenol at 0600     This 51 y.o female, with a medical history of Arthritis, Asthma, Bipolar 1 disorder, COPD, Coronary artery disease, Diabetes mellitus, DVT of lower extremity (bilateral), History of blood clots (1. Left Leg=2003; 2.Bilateral Groin=Blood Clots= 5 or 6/ 2013 & 7/2013; 3. LLL of Lung=7/2013;  4. Lt. Lower Leg=7/2013. ), Hypertension, Hypercholesteremia, Irregular heartbeat, Neuromuscular disorder, Pulmonary embolism, and Restless leg syndrome, presents to the ED c/o a constant, severe (10/10) wound to the bottom of the left foot that began a couple of weeks ago. Pt reports also experiencing redness radiating up the left leg, a subjective fever, shortness of breath, and a smaller wound to the bottom of the right foot. Pt states that she has been cleaning the wound to her left foot herself since onset and was placed on antibiotics, but did not start taking the medication until 3 days ago due to her daughter hiding it from her. She states that she visited her podiatrist, Dr. De Los Santos, today and was instructed to come into the ED for further evaluation. Pt denies chest pain, abdominal pain, or any other associated symptoms. No alleviating factors.    The history is provided by the patient.     Review of patient's allergies indicates:   Allergen Reactions    Morphine Other (See Comments)     Patient had a psychotic episode after taking Morphine  Agitation, hallucinations    Penicillins Anaphylaxis      "itching    Januvia [sitagliptin] Hives     Past Medical History:   Diagnosis Date    ADHD (attention deficit hyperactivity disorder)     Arthritis     Asthma     Bipolar 1 disorder     Cataract     Cigarette smoker     COPD (chronic obstructive pulmonary disease)     Coronary artery disease     A fib    Depression     bipolar manic depresson    Diabetes mellitus     Diabetic foot ulcers     Diabetic neuropathy     DVT of lower extremity, bilateral 07/2013    bilateral LE DVT. Handley filter placed.     Encounter for blood transfusion     History of blood clots 1. Left Leg=2003; 2.Bilateral Groin=Blood Clots= 5 or 6/ 2013 & 7/2013; 3. LLL of Lung=7/2013;  4. Lt. Lower Leg=7/2013.     Pt. had 1st Blood Clot after Sejcpejtjiwo=[id number], & Last=2013. Estelita Filter= Rt.Lateral Neck.    HTN (hypertension) 06/06/2013    Pt states that she does not have hypertension    Hypercholesteremia     Irregular heartbeat     Neuromuscular disorder     neuropathy feet    Obese     PE (pulmonary embolism) 07/2013    bilat LE DVT.     Restless leg syndrome      Past Surgical History:   Procedure Laterality Date    ABDOMINAL SURGERY  2010    gastric sleeve    BILATERAL OOPHORECTOMY Bilateral 1/12/2015    CHOLECYSTECTOMY      Green' s filter Right 7/4/2012    Right Neck & Tunneled Down.    HERNIA REPAIR      "[place] Hernias [employer]aires around th [place].", pt. states    LAPAROSCOPIC CHOLECYSTECTOMY N/A 9/10/2020    Procedure: CHOLECYSTECTOMY, LAPAROSCOPIC;  Surgeon: Montrell Gutierrez MD;  [facility];  Service: General;  Laterality: N/A;  RN PREOP 9/9----COVID Negative  9/9    OVARIAN CYST REMOVAL  3/13/2014    MA REMOVAL OF OVARY/TUBE(S)      SPLENECTOMY, TOTAL  July 2003    TONSILLECTOMY      as a child    TYMPANOSTOMY TUBE PLACEMENT  1976    VEIN SURGERY  2003    Lt leg     Family History   Problem Relation Age of Onset    Hypertension Father     Diabetes Father     Heart disease " Father     Cataracts Father     Diabetes Paternal Grandfather     Heart disease Paternal Grandfather     No Known Problems Mother     Ovarian cancer Maternal Grandmother          from this. ? age     No Known Problems Sister     No Known Problems Brother     No Known Problems Maternal Aunt     No Known Problems Maternal Uncle     No Known Problems Paternal Aunt     No Known Problems Paternal Uncle     No Known Problems Maternal Grandfather     Ovarian cancer Paternal Grandmother     Uterine cancer Neg Hx     Breast cancer Neg Hx     Colon cancer Neg Hx     Amblyopia Neg Hx     Blindness Neg Hx     Cancer Neg Hx     Glaucoma Neg Hx     Macular degeneration Neg Hx     Retinal detachment Neg Hx     Strabismus Neg Hx     Stroke Neg Hx     Thyroid disease Neg Hx      Social History     Tobacco Use    Smoking status: Current Every Day Smoker     Packs/day: 1.00     Years: 37.00     Pack years: 37.00     Types: Cigarettes     Last attempt to quit: 2020     Years since quittin.4    Smokeless tobacco: Never Used    Tobacco comment: Enrolled in the [employer] on 5/3/14 ([facility] Member ID # [id number]). Ambulatory referral to Smoking Cessation Program   Substance Use Topics    Alcohol use: No     Alcohol/week: 0.0 standard drinks    Drug use: No     Review of Systems   Constitutional: Positive for fever (subjective).   HENT: Negative for sore throat.    Eyes: Negative for visual disturbance.   Respiratory: Positive for shortness of breath.    Cardiovascular: Negative for chest pain.   Gastrointestinal: Negative for abdominal pain and nausea.   Genitourinary: Negative for dysuria.   Musculoskeletal: Negative for back pain.   Skin: Positive for wound (to the bottoms of the bilateral feet). Negative for rash.        (+) redness radiating from the left foot up the leg   Neurological: Negative for weakness.       Physical Exam     Initial Vitals [22 0914]   BP Pulse Resp Temp SpO2    (!) 149/66 95 16 99.4 °F (37.4 °C) 96 %      MAP       --         Physical Exam    Nursing note and vitals reviewed.  Constitutional: She appears well-developed and well-nourished. She is not diaphoretic. No distress.   HENT:   Head: Normocephalic and atraumatic.   Eyes: Conjunctivae are normal.   Neck:   Normal range of motion.  Cardiovascular: Normal rate, regular rhythm and normal heart sounds.   Pulmonary/Chest: Breath sounds normal. No respiratory distress.   Abdominal: Abdomen is soft. There is no abdominal tenderness.   Musculoskeletal:         General: No edema.      Cervical back: Normal range of motion.     Neurological: She is alert and oriented to person, place, and time.   Skin: Skin is warm and dry.   The inner margins are 5 cm x 7cm with a serrated area with a darkened central area. There are areas extending beyond the borders that are hypopigmented. Erythema is present to the bilateral lower extremities medially and caudally up to the groin with edema and warmth to the bilateral lower extremities. There is also a shallow based ulcer to the first digit of the right foot that is 3.5 cm x 2.5 cm. To the ball of the foot there is another shallow based ulcer that is 5 cm x 2 cm at its largest.    Psychiatric: She has a normal mood and affect.         ED Course   Procedures  Labs Reviewed   CBC W/ AUTO DIFFERENTIAL - Abnormal; Notable for the following components:       Result Value    WBC 20.54 (*)     RBC 2.62 (*)     Hemoglobin 7.3 (*)     Hematocrit 21.3 (*)     MCV 81 (*)     RDW 15.7 (*)     Platelets 628 (*)     MPV 9.0 (*)     Immature Granulocytes 1.0 (*)     Gran # (ANC) 15.2 (*)     Immature Grans (Abs) 0.20 (*)     Mono # 3.1 (*)     Gran % 74.1 (*)     Lymph % 9.6 (*)     Mono % 15.1 (*)     Platelet Estimate Increased (*)     All other components within normal limits   COMPREHENSIVE METABOLIC PANEL - Abnormal; Notable for the following components:    Sodium 118 (*)     Chloride 85 (*)      Glucose 125 (*)     Calcium 8.0 (*)     Albumin 2.1 (*)     Alkaline Phosphatase 136 (*)     All other components within normal limits    Narrative:     Sodium   critical result(s) called and verbal readback obtained from   Lainey Ruiz by VERONIKA 05/04/2022 11:01   PROCALCITONIN - Abnormal; Notable for the following components:    Procalcitonin 0.34 (*)     All other components within normal limits   C-REACTIVE PROTEIN - Abnormal; Notable for the following components:    .2 (*)     All other components within normal limits   SEDIMENTATION RATE - Abnormal; Notable for the following components:    Sed Rate 115 (*)     All other components within normal limits   LACTIC ACID, PLASMA   LACTIC ACID, PLASMA   URINALYSIS, REFLEX TO URINE CULTURE   SARS-COV-2 RDRP GENE     EKG Readings: (Independently Interpreted)   Initial Reading: No STEMI. Heart Rate: 94. Ectopy: No Ectopy.       Imaging Results          X-Ray Chest AP Portable (Final result)  Result time 05/04/22 10:48:54    Final result by Josr Almanzar MD (05/04/22 10:48:54)                 Impression:      Mildly prominent interstitial markings may be accentuated by AP portable technique, noting that vascular congestion/edema versus infectious or inflammatory etiology could appear similar.      Electronically signed by: Josr Almanzar  Date:    05/04/2022  Time:    10:48             Narrative:    EXAMINATION:  XR CHEST AP PORTABLE    CLINICAL HISTORY:  Sepsis;    TECHNIQUE:  Single frontal view of the chest was performed.    COMPARISON:  Chest radiograph 04/13/2022    FINDINGS:  Monitoring leads overlie the chest.  Cardiomediastinal contour appears grossly unchanged.  Mildly prominent interstitial markings noted.  No definite pneumothorax or large volume pleural effusion.  No acute findings identified in the visualized abdomen.  Osseous and soft tissue structures appear without definite acute abnormality.                               X-Ray Foot Complete  Right (Final result)  Result time 05/04/22 10:52:15    Final result by Josr Almanzar MD (05/04/22 10:52:15)                 Impression:      No definite evidence of acute fracture or dislocation.      Electronically signed by: Josr Almanzar  Date:    05/04/2022  Time:    10:52             Narrative:    EXAMINATION:  XR FOOT COMPLETE 3 VIEW RIGHT    CLINICAL HISTORY:  . Unspecified infectious disease    TECHNIQUE:  AP, lateral, and oblique views of the right foot were performed.    COMPARISON:  Right foot radiograph 04/20/2022.    FINDINGS:  No definite evidence of acute fracture or dislocation.  Lisfranc joint appears congruent.  There appears to be chronic appearing deformity at the 4th digit metatarsal head and neck with erosive change at the lateral aspect of the head.    Joint spaces appear to be maintained.  There is soft tissue swelling most pronounced at the dorsum of the mid and forefoot.  No definite radiopaque foreign body.  Enthesopathic change at the calcaneus.                                 Medications   vancomycin - pharmacy to dose (has no administration in time range)   mupirocin 2 % ointment ( Nasal Not Given 5/4/22 1245)   vancomycin (VANCOCIN) 1,750 mg in dextrose 5 % 500 mL IVPB (1,750 mg Intravenous Trough Due As Scheduled Before Dose 5/5/22 2130)   aspirin chewable tablet 81 mg (81 mg Oral Given 5/4/22 1426)   divalproex EC tablet 1,250 mg (has no administration in time range)   risperiDONE tablet 2 mg (2 mg Oral Given 5/4/22 1416)   pravastatin tablet 40 mg (has no administration in time range)   OLANZapine tablet 10 mg (has no administration in time range)   metoprolol tartrate (LOPRESSOR) tablet 50 mg (50 mg Oral Given 5/4/22 1426)   hydrOXYzine pamoate capsule 50 mg (50 mg Oral Given 5/4/22 1417)   sodium chloride 0.9% flush 10 mL (has no administration in time range)   naloxone 0.4 mg/mL injection 0.02 mg (has no administration in time range)   glucose chewable tablet 16 g (has no  "administration in time range)   glucagon (human recombinant) injection 1 mg (has no administration in time range)   dextrose 10% bolus 125 mL (has no administration in time range)   dextrose 10% bolus 250 mL (has no administration in time range)   fluticasone furoate-vilanteroL 100-25 mcg/dose diskus inhaler 1 puff ( Inhalation Canceled Entry 5/4/22 1400)   divalproex EC tablet 500 mg (500 mg Oral Given 5/4/22 1416)   carBAMazepine tablet 200 mg (200 mg Oral Not Given 5/4/22 1300)   sodium chloride 0.9% flush 10 mL (has no administration in time range)   naloxone 0.4 mg/mL injection 0.02 mg (has no administration in time range)   glucose chewable tablet 24 g (has no administration in time range)   glucagon (human recombinant) injection 1 mg (has no administration in time range)   insulin aspart U-100 pen 0-5 Units (has no administration in time range)   risperiDONE tablet 3 mg (has no administration in time range)   pantoprazole injection 40 mg (40 mg Intravenous Given 5/4/22 1418)   acetaminophen tablet 1,000 mg (has no administration in time range)   oxyCODONE-acetaminophen 7.5-325 mg per tablet 1 tablet (1 tablet Oral Not Given 5/4/22 1756)   doxycycline (VIBRAMYCIN) 100 mg in dextrose 5 % 250 mL IVPB (0 mg Intravenous Stopped 5/4/22 1536)   0.9%  NaCl infusion ( Intravenous New Bag 5/4/22 1834)   HYDROmorphone (PF) injection 1 mg (1 mg Intravenous Given 5/4/22 1016)   vancomycin (VANCOCIN) 2,000 mg in dextrose 5 % 500 mL IVPB ( Intravenous Stopped 5/4/22 1229)     Medical Decision Making:   Initial Assessment:   This is an emergent evaluation of a 51-year-old woman who presented to the emergency department today secondary to a wound to her left foot, warmth to her extremities, fevers, and chills.  Differential Diagnosis:   Diabetic ulcer, cellulitis, sepsis, amongst others.  Clinical Tests:   Lab Tests: Reviewed  Radiological Study: Reviewed  Medical Tests: Reviewed  Sepsis Perfusion Assessment: "I attest a " "sepsis perfusion exam was performed within 6 hours of sepsis, severe sepsis, or septic shock presentation, following fluid resuscitation."  ED Management:  On physical examination, patient was uncomfortable appearing and tearful.  She was in no acute distress however.  Her heart tones were within normal limits.  Lungs were clear to auscultation bilaterally.  Evaluation of the lower extremities reveals significant erythema and warmth extending from her toes caudally in the medial aspect of her legs to approximately 10 cm below her groin.  Her legs were edematous.  She had significant wounds to the plantar aspect of her feet.  Labs were ordered as was Vancomycin and Dilaudid.  Anticipate admission once these return.    Marilu Mcgarry MD  10:31 AM  5/4/2022    Patient's labs returned with multiple abnormalities including a leukocytosis, hyponatremia with a sodium of 118, decreased H&H, increased CRP.  Patient was consulted to Dr. Scott Fuller with the internal medicine service who agreed to admit this patient at this time.    Marilu Mcgarry MD  11:16 AM  5/4/2022     Other:   I have discussed this case with another health care provider.       <> Summary of the Discussion: Dr Fuller.           Juan Attestation:   Scribe #1: I performed the above scribed service and the documentation accurately describes the services I performed. I attest to the accuracy of the note.                 Clinical Impression:   Final diagnoses:  [T14.8XXA, L08.9] Wound infection  [B99.9] Infection  [L03.90] Cellulitis          ED Disposition Condition    Admit               I, Marilu Mcgarry , personally performed the services described in this documentation. All medical record entries made by the scribe were at my direction and in my presence. I have reviewed the chart and agree that the record reflects my personal performance and is accurate and complete.     Marilu Mcgarry MD  05/04/22 1931    "

## 2022-05-04 NOTE — ASSESSMENT & PLAN NOTE
Long history of bipolar 1 disorder with recent psychosis at last hospital stay.  Will resume regimen Risperidone, Depakote.  Holding carbamazepine at the moment due to hyponatremia  - sister Crystal states patient has not been compliant with her medications and feels that she still has some paranoia that her stepdaughter had been hiding her medications though she believes it was the patient herself.

## 2022-05-04 NOTE — ED TRIAGE NOTES
"51 y.o female presents to the ED with chief complaint of wound infection. Pt reports she was sent from her podiatrist for possible surgery on left foot. Wound to bottom left foot has black tissue. Wound to bottom of right foot is red and yellow. Both have drainage. Pt reports the wounds have been there for "a while". Pt reports subjective fever this morning, states her daughter hid her thermometer. Pt reports hx of COPD and 3 L oxygen PRN. Pt denies CP, SOB, abdominal pain, N/V/D, and any other symptoms. AAOx4, NAD.   " 01-Oct-2018

## 2022-05-04 NOTE — SUBJECTIVE & OBJECTIVE
"Past Medical History:   Diagnosis Date    ADHD (attention deficit hyperactivity disorder)     Arthritis     Asthma     Bipolar 1 disorder     Cataract     Cigarette smoker     COPD (chronic obstructive pulmonary disease)     Coronary artery disease     A fib    Depression     bipolar manic depresson    Diabetes mellitus     Diabetic foot ulcers     Diabetic neuropathy     DVT of lower extremity, bilateral 07/2013    bilateral LE DVT. [place] filter placed.     Encounter for blood transfusion     History of blood clots 1. Left Leg=2003; 2.Bilateral Groin=Blood Clots= 5 or 6/ 2013 & 7/2013; 3. LLL of Lung=7/2013;  4. Lt. Lower Leg=7/2013.     Pt. had 1st Blood Clot after Ydwnexjofslv=1016, & Last=2013. [place] Filter= Rt.Lateral Neck.    HTN (hypertension) 06/06/2013    Pt states that she does not have hypertension    Hypercholesteremia     Irregular heartbeat     Neuromuscular disorder     neuropathy feet    Obese     PE (pulmonary embolism) 07/2013    bilat LE DVT.     Restless leg syndrome        Past Surgical History:   Procedure Laterality Date    ABDOMINAL SURGERY  2010    gastric sleeve    BILATERAL OOPHORECTOMY Bilateral 1/12/2015    CHOLECYSTECTOMY      [place] filter Right 7/4/2012    Right Neck & Tunneled Down.    HERNIA REPAIR      "[facility] Repaires around th [place].", pt. states    LAPAROSCOPIC CHOLECYSTECTOMY N/A 9/10/2020    Procedure: CHOLECYSTECTOMY, LAPAROSCOPIC;  Surgeon: Montrell Gutierrez MD;  Location: [facility] OR;  Service: General;  Laterality: N/A;  RN PREOP 9/9----COVID Negative  9/9    OVARIAN CYST REMOVAL  3/13/2014    MT REMOVAL OF OVARY/TUBE(S)      SPLENECTOMY, TOTAL  July 2003    TONSILLECTOMY      as a child    TYMPANOSTOMY TUBE PLACEMENT  1976    VEIN SURGERY  2003    Lt leg       Review of patient's allergies indicates:   Allergen Reactions    Morphine Other (See Comments)     Patient had a psychotic episode after taking Morphine  Agitation, hallucinations    " Penicillins Anaphylaxis     itching    Januvia [sitagliptin] Hives       No current facility-administered medications on file prior to encounter.     Current Outpatient Medications on File Prior to Encounter   Medication Sig    acetaminophen (TYLENOL) 500 MG tablet Take 2 tablets (1,000 mg total) by mouth every 6 (six) hours as needed for Pain.    albuterol (PROVENTIL/VENTOLIN HFA) 90 mcg/actuation inhaler Inhale 2 puffs into the lungs every 6 (six) hours as needed for Wheezing. Use with spacer  Dispense with 1 spacer    albuterol-ipratropium (DUO-NEB) 2.5 mg-0.5 mg/3 mL nebulizer solution Take 3 mLs by nebulization every 6 (six) hours as needed for Wheezing or Shortness of Breath. Rescue    apixaban (ELIQUIS) 5 mg Tab Take 1 tablet (5 mg total) by mouth 2 (two) times daily.    aspirin 81 MG Chew Take 1 tablet (81 mg total) by mouth once daily.    dicyclomine (BENTYL) 20 mg tablet Take 1 tablet (20 mg total) by mouth every 6 (six) hours.    divalproex (DEPAKOTE) 250 MG EC tablet Take 5 tablets (1,250 mg total) by mouth every evening.    divalproex (DEPAKOTE) 500 MG TbEC Take 1 tablet (500 mg total) by mouth once daily. PO QAM    doxycycline (VIBRAMYCIN) 100 MG Cap Take 1 capsule (100 mg total) by mouth every 12 (twelve) hours. for 8 days    DUPIXENT  mg/2 mL PnIj SMARTSI Milligram(s) SUB-Q Every 2 Weeks    EPITOL 200 mg tablet Take 200 mg by mouth 2 (two) times a day.    famotidine (PEPCID) 20 MG tablet Take 20 mg by mouth 2 (two) times daily.    fluticasone propionate (FLONASE) 50 mcg/actuation nasal spray 1 spray (50 mcg total) by Each Nostril route 2 (two) times daily.    furosemide (LASIX) 20 MG tablet TAKE 1 TABLET(20 MG) BY MOUTH EVERY DAY    gabapentin (NEURONTIN) 300 MG capsule TAKE 2 CAPSULES(600 MG) BY MOUTH TWICE DAILY    hydrOXYzine (ATARAX) 50 MG tablet Take 50 mg by mouth 4 (four) times daily as needed.    ibuprofen (ADVIL,MOTRIN) 600 MG tablet TAKE 1 TABLET(600 MG) BY MOUTH EVERY 8 HOURS  AS NEEDED FOR PAIN OR BACK PAIN    lisinopriL 10 MG tablet Take 1 tablet (10 mg total) by mouth once daily.    loratadine (CLARITIN) 10 mg tablet Take 1 tablet (10 mg total) by mouth once daily.    magnesium oxide (MAG-OX) 400 mg (241.3 mg magnesium) tablet Take 1 tablet (400 mg total) by mouth 2 (two) times daily.    metFORMIN (GLUCOPHAGE) 1000 MG tablet TAKE 1 TABLET(1000 MG) BY MOUTH TWICE DAILY WITH MEALS    metoprolol tartrate (LOPRESSOR) 50 MG tablet TAKE 1 TABLET(50 MG) BY MOUTH TWICE DAILY    metroNIDAZOLE (FLAGYL) 500 MG tablet Take 1 tablet (500 mg total) by mouth every 8 (eight) hours. for 8 days    OLANZapine (ZYPREXA) 10 MG tablet Take 1 tablet (10 mg total) by mouth every 8 (eight) hours as needed (Agitation).    OPTICHAMBER BIGG LG MASK Spcr Inhale into the lungs.    pantoprazole (PROTONIX) 40 MG tablet Take 1 tablet (40 mg total) by mouth once daily.    polyvinyl alcohol, artificial tears, (LIQUIFILM TEARS) 1.4 % ophthalmic solution Place 1 drop into both eyes 4 (four) times daily.    pravastatin (PRAVACHOL) 40 MG tablet TAKE 1 TABLET(40 MG) BY MOUTH EVERY EVENING    risperiDONE (RISPERDAL M-TABS) 3 MG disintegrating tablet Take 1 tablet (3 mg total) by mouth 2 (two) times daily.    senna (SENOKOT) 8.6 mg tablet Take 1 tablet by mouth 2 (two) times a day.    blood sugar diagnostic Strp To check BG two  times daily, to use with insurance preferred meter (Patient taking differently: To check BG two  times daily, to use with insurance preferred meter)    blood-glucose meter kit To check BG once daily, to use with insurance preferred meter    blood-glucose meter kit To check BG two times daily, to use with insurance preferred meter    fluticasone-salmeterol diskus inhaler 250-50 mcg Inhale 1 puff into the lungs 2 (two) times daily. Controller    hydrOXYzine pamoate (VISTARIL) 50 MG Cap Take 1 capsule (50 mg total) by mouth 3 (three) times daily.    lancets Misc To check BG two times daily, to use with  insurance preferred meter    multivitamin Tab Take 1 tablet by mouth once daily.    TRUE METRIX GLUCOSE METER [facility] CHECK BLOOD SUGAR TWICE DAILY    [DISCONTINUED] diclofenac sodium (VOLTAREN) 1 % Gel Apply 2 g topically 4 (four) times daily as needed (Apply to painful area up to 4 times a day as needed for pain). Apply to painful area 4 times a day as needed for pain    [DISCONTINUED] folic acid (FOLVITE) 1 MG tablet Take 1 tablet (1 mg total) by mouth once daily.    [DISCONTINUED] nystatin (NYSTOP) powder APPLY TOPICALLY TO AXILLA AND BREAST FOLDS TWICE DAILY    [DISCONTINUED] QUEtiapine (SEROQUEL) 200 MG Tab Take 1 tablet (200 mg total) by mouth before breakfast.     Family History       Problem Relation (Age of Onset)    Cataracts Father    Diabetes Father, Paternal Grandfather    Heart disease Father, Paternal Grandfather    Hypertension Father    No Known Problems Mother, Sister, Brother, Maternal Aunt, Maternal Uncle, Paternal Aunt, Paternal Uncle, Maternal Grandfather    Ovarian cancer Maternal Grandmother, Paternal Grandmother          Tobacco Use    Smoking status: Current Every Day Smoker     Packs/day: 1.00     Years: 37.00     Pack years: 37.00     Types: Cigarettes     Last attempt to quit: 2020     Years since quittin.4    Smokeless tobacco: Never Used    Tobacco comment: Enrolled in the [employer] on 5/3/14 ([facility] Member ID # [id number]). Ambulatory referral to Smoking Cessation Program   Substance and Sexual Activity    Alcohol use: No     Alcohol/week: 0.0 standard drinks    Drug use: No    Sexual activity: Yes     Partners: Male     Review of Systems   Constitutional:  Positive for fever. Negative for activity change and chills.   HENT:  Negative for congestion, rhinorrhea, sinus pressure and trouble swallowing.    Eyes:  Negative for visual disturbance.   Respiratory:  Negative for cough and shortness of breath.    Cardiovascular:  Positive for leg swelling. Negative for chest pain.    Gastrointestinal:  Negative for abdominal pain, constipation, diarrhea, nausea and vomiting.        Black stool   Endocrine: Negative for polyuria.   Genitourinary:  Negative for dysuria, frequency, hematuria and vaginal pain.   Musculoskeletal:  Positive for arthralgias and myalgias.   Skin:  Positive for rash and wound.   Neurological:  Negative for dizziness, seizures, weakness and headaches.   Psychiatric/Behavioral:  Positive for behavioral problems. The patient is nervous/anxious.    Objective:     Vital Signs (Most Recent):  Temp: 97.7 °F (36.5 °C) (05/04/22 1541)  Pulse: 74 (05/04/22 1541)  Resp: 20 (05/04/22 1541)  BP: (!) 101/57 (05/04/22 1541)  SpO2: 97 % (05/04/22 1541)   Vital Signs (24h Range):  Temp:  [97.7 °F (36.5 °C)-99.5 °F (37.5 °C)] 97.7 °F (36.5 °C)  Pulse:  [74-95] 74  Resp:  [16-20] 20  SpO2:  [96 %-100 %] 97 %  BP: (101-179)/(57-79) 101/57     Weight: 118.5 kg (261 lb 3.9 oz)  Body mass index is 42.17 kg/m².    Physical Exam  Vitals and nursing note reviewed.   Constitutional:       Appearance: Normal appearance. She is ill-appearing.   HENT:      Head: Normocephalic and atraumatic.      Mouth/Throat:      Mouth: Mucous membranes are dry.      Pharynx: Oropharynx is clear.   Eyes:      General: No scleral icterus.     Conjunctiva/sclera: Conjunctivae normal.   Cardiovascular:      Rate and Rhythm: Normal rate and regular rhythm.      Pulses: Normal pulses.      Heart sounds: Normal heart sounds.   Pulmonary:      Effort: Pulmonary effort is normal. No respiratory distress.      Breath sounds: Normal breath sounds. No wheezing or rales.   Abdominal:      General: Bowel sounds are normal. There is no distension.      Palpations: Abdomen is soft.      Tenderness: There is no abdominal tenderness.   Musculoskeletal:         General: Swelling present.   Skin:     Comments: See pictures   Neurological:      Mental Status: She is alert.   Psychiatric:         Attention and Perception: Attention  normal.         Mood and Affect: Mood is depressed. Affect is tearful.         Speech: Speech normal.         Behavior: Behavior is not agitated. Behavior is cooperative.                   Significant Labs: All pertinent labs within the past 24 hours have been reviewed.    Significant Imaging: I have reviewed all pertinent imaging results/findings within the past 24 hours.

## 2022-05-04 NOTE — PROGRESS NOTES
Pharmacokinetic Initial Assessment: IV Vancomycin    Assessment/Plan:    Initiate intravenous vancomycin with loading dose of 2000 mg once followed by a maintenance dose of vancomycin 1750 mg IV every 12 hours  Desired empiric serum trough concentration is 10 to 20 mcg/mL  Draw vancomycin trough level 60 min prior to fourth dose on 05/05/2022 at approximately 21:30  Pharmacy will continue to follow and monitor vancomycin.      Please contact pharmacy at extension [phone] with any questions regarding this assessment.     Thank you for the consult,   Ramona Johnson       Patient brief summary:  Audrey Natarajan is a 51 y.o. female initiated on antimicrobial therapy with IV Vancomycin for treatment of suspected skin & soft tissue infection    Drug Allergies:   Review of patient's allergies indicates:   Allergen Reactions    Morphine Other (See Comments)     Patient had a psychotic episode after taking Morphine  Agitation, hallucinations    Penicillins Anaphylaxis     itching    Januvia [sitagliptin] Hives       Actual Body Weight:   106.6 kg    Renal Function:   Estimated Creatinine Clearance: 140 mL/min (based on SCr of 0.6 mg/dL).,     Dialysis Method (if applicable):  N/A    CBC (last 72 hours):  Recent Labs   Lab Result Units 05/04/22  0944   WBC K/uL 20.54*   Hemoglobin g/dL 7.3*   Hematocrit % 21.3*   Platelets K/uL 628*   Gran % % 74.1*   Lymph % % 9.6*   Mono % % 15.1*   Eosinophil % % 0.0   Basophil % % 0.2   Differential Method  Automated       Metabolic Panel (last 72 hours):  Recent Labs   Lab Result Units 05/04/22  0944   Sodium mmol/L 118*   Potassium mmol/L 5.0   Chloride mmol/L 85*   CO2 mmol/L 25   Glucose mg/dL 125*   BUN mg/dL 12   Creatinine mg/dL 0.6   Albumin g/dL 2.1*   Total Bilirubin mg/dL 0.3   Alkaline Phosphatase U/L 136*   AST U/L 18   ALT U/L 14       Drug levels (last 3 results):  No results for input(s): VANCOMYCINRA, VANCOMYCINPE, VANCOMYCINTR in the last 72 hours.    Microbiologic  Results:  Microbiology Results (last 7 days)       Procedure Component Value Units Date/Time    Blood culture x two cultures. Draw prior to antibiotics. [[id number]] Collected: 05/04/22 0947    Order Status: Sent Specimen: Blood from Peripheral, Antecubital, Left Updated: 05/04/22 0957    Blood culture x two cultures. Draw prior to antibiotics. [[id number]] Collected: 05/04/22 0944    Order Status: Sent Specimen: Blood from Peripheral, Antecubital, Right Updated: 05/04/22 0950

## 2022-05-04 NOTE — ED NOTES
Called to give report and was told the nurse was in another pt's room.  wrote the number and contact info for the nurse to return the call for report. Charge was notified
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
## 2022-05-04 NOTE — ASSESSMENT & PLAN NOTE
She has a chronic left plantar aspect wound that has been treated in the past.  Recent hospital stay growing MRSA.  Podiatry following and planning for MRI.  She now has wounds to the bottom of her right foot that is concerning as well.  - appreciate Podiatry recommendations  Will follow-up MRI continue IV antibiotics for now.

## 2022-05-04 NOTE — PHARMACY MED REC
"Admission Medication History     The home medication history was taken by Carley Pena CPhT.    You may go to "Admission" then "Reconcile Home Medications" tabs to review and/or act upon these items.      The home medication list has been updated by the Pharmacy department.    Please read ALL comments highlighted in yellow.    Please address this information as you see fit.     Feel free to contact us if you have any questions or require assistance.      The medications listed below were removed from the home medication list. Please reorder if appropriate:  Patient reports no longer taking the following medication(s):   Folvite 1 mg tab      Medications listed below were obtained from: Patient/family and Analytic software- [employer]  (Not in a hospital admission)      Potential issues to be addressed PRIOR TO DISCHARGE  Patient reported not taking the following medications: (Volatren 1% gel; nystatin powder; seroquel 30 mg tab). These medications remain on the home medication list. Please address accordingly.         Carley Pena CPhT.  [phone]                    .          "

## 2022-05-04 NOTE — ASSESSMENT & PLAN NOTE
Body mass index is 42.17 kg/m². Morbid obesity complicates all aspects of disease management from diagnostic modalities to treatment. Weight loss encouraged and health benefits explained to patient.

## 2022-05-04 NOTE — CONSULTS
[place] - Telemetry  Podiatry  Consult Note    Patient Name: Audrey Natarajan  MRN: [id number]  Admission Date: 5/4/2022  Hospital Length of Stay: 0 days  Attending Physician: Scott Fuller MD  Primary Care Provider: Donaldo Pena MD     Inpatient consult to Podiatry  Consult performed by: Halle Gill DPM  Consult ordered by: Scott Fulelr MD        Subjective:     History of Present Illness: 52 y/o female PMH DM2, bipolar admitted for wound infection B/L. Patient recently discharged from [facility] on 4/26/22. Patient reports unable to take medications as family member was hiding meds from her. Seen earlier today in podiatry clinic Dr. De Los Santos, sent to ED for admission.     Scheduled Meds:   aspirin  81 mg Oral Daily    carBAMazepine  200 mg Oral BID    divalproex  1,250 mg Oral QHS    divalproex  500 mg Oral Daily    doxycycline (VIBRAMYCIN) IVPB  100 mg Intravenous Q12H    fluticasone furoate-vilanteroL  1 puff Inhalation Daily    hydrOXYzine pamoate  50 mg Oral TID    metoprolol tartrate  50 mg Oral BID    mupirocin   Nasal BID    pantoprazole  40 mg Intravenous BID    pravastatin  40 mg Oral QHS    risperiDONE  2 mg Oral Daily    risperiDONE  3 mg Oral QHS    vancomycin (VANCOCIN) IVPB  1,750 mg Intravenous Q12H     Continuous Infusions:  PRN Meds:acetaminophen, dextrose 10%, dextrose 10%, glucagon (human recombinant), glucagon (human recombinant), glucose, glucose, insulin aspart U-100, naloxone, naloxone, OLANZapine, oxyCODONE-acetaminophen, sodium chloride 0.9%, sodium chloride 0.9%, Pharmacy to dose Vancomycin consult **AND** vancomycin - pharmacy to dose    Review of patient's allergies indicates:   Allergen Reactions    Morphine Other (See Comments)     Patient had a psychotic episode after taking Morphine  Agitation, hallucinations    Penicillins Anaphylaxis     itching    Januvia [sitagliptin] Hives        Past Medical History:   Diagnosis Date    ADHD (attention deficit  "hyperactivity disorder)     Arthritis     Asthma     Bipolar 1 disorder     Cataract     Cigarette smoker     COPD (chronic obstructive pulmonary disease)     Coronary artery disease     A fib    Depression     bipolar manic depresson    Diabetes mellitus     Diabetic foot ulcers     Diabetic neuropathy     DVT of lower extremity, bilateral 07/2013    bilateral LE DVT. [place] filter placed.     Encounter for blood transfusion     History of blood clots 1. Left Leg=2003; 2.Bilateral Groin=Blood Clots= 5 or 6/ 2013 & 7/2013; 3. LLL of Lung=7/2013;  4. Lt. Lower Leg=7/2013.     Pt. had 1st Blood Clot after [id number], & Last=2013. [place] Filter= Rt.Lateral Neck.    HTN (hypertension) 06/06/2013    Pt states that she does not have hypertension    Hypercholesteremia     Irregular heartbeat     Neuromuscular disorder     neuropathy feet    Obese     PE (pulmonary embolism) 07/2013    bilat LE DVT.     Restless leg syndrome      Past Surgical History:   Procedure Laterality Date    ABDOMINAL SURGERY  2010    gastric sleeve    BILATERAL OOPHORECTOMY Bilateral 1/12/2015    CHOLECYSTECTOMY      Green' s filter Right 7/4/2012    Right Neck & Tunneled Down.    HERNIA REPAIR      "[place] of Hernias Repaires around th [place].", pt. states    LAPAROSCOPIC CHOLECYSTECTOMY N/A 9/10/2020    Procedure: CHOLECYSTECTOMY, LAPAROSCOPIC;  Surgeon: Montrell Gutierrez MD;  Location: [facility] OR;  Service: General;  Laterality: N/A;  RN PREOP 9/9----COVID Negative  9/9    OVARIAN CYST REMOVAL  3/13/2014    IN REMOVAL OF OVARY/TUBE(S)      SPLENECTOMY, TOTAL  July 2003    TONSILLECTOMY      as a child    TYMPANOSTOMY TUBE PLACEMENT  1976    VEIN SURGERY  2003    Lt leg       Family History     Problem Relation (Age of Onset)    Cataracts Father    Diabetes Father, Paternal Grandfather    Heart disease Father, Paternal Grandfather    Hypertension Father    No Known Problems Mother, Sister, " Brother, Maternal Aunt, Maternal Uncle, Paternal Aunt, Paternal Uncle, Maternal Grandfather    Ovarian cancer Maternal Grandmother, Paternal Grandmother        Tobacco Use    Smoking status: Current Every Day Smoker     Packs/day: 1.00     Years: 37.00     Pack years: 37.00     Types: Cigarettes     Last attempt to quit: 2020     Years since quittin.4    Smokeless tobacco: Never Used    Tobacco comment: Enrolled in the [employer] on 5/3/14 ([facility] Member ID # [id number]). Ambulatory referral to Smoking Cessation Program   Substance and Sexual Activity    Alcohol use: No     Alcohol/week: 0.0 standard drinks    Drug use: No    Sexual activity: Yes     Partners: Male     Review of Systems   Constitutional: Negative.    Respiratory: Negative.    Genitourinary: Negative.    Musculoskeletal: Positive for arthralgias.   Skin: Positive for wound.   Psychiatric/Behavioral: Negative.      Objective:     Vital Signs (Most Recent):  Temp: 97.7 °F (36.5 °C) (22 1541)  Pulse: 74 (22 1541)  Resp: 20 (22 1541)  BP: (!) 101/57 (22 1541)  SpO2: 97 % (22 1541) Vital Signs (24h Range):  Temp:  [97.7 °F (36.5 °C)-99.5 °F (37.5 °C)] 97.7 °F (36.5 °C)  Pulse:  [74-95] 74  Resp:  [16-20] 20  SpO2:  [96 %-100 %] 97 %  BP: (101-179)/(57-79) 101/57     Weight: 118.5 kg (261 lb 3.9 oz)  Body mass index is 42.17 kg/m².    Foot Exam    General  Orientation: alert and oriented to person, place, and time       Right Foot/Ankle     Neurovascular  Dorsalis pedis: 1+  Posterior tibial: 1+  Saphenous nerve sensation: diminished  Tibial nerve sensation: diminished  Superficial peroneal nerve sensation: diminished  Deep peroneal nerve sensation: diminished  Sural nerve sensation: diminished      Left Foot/Ankle      Neurovascular  Dorsalis pedis: 1+  Posterior tibial: 1+  Saphenous nerve sensation: diminished  Tibial nerve sensation: diminished  Superficial peroneal nerve sensation: diminished  Deep  peroneal nerve sensation: diminished  Sural nerve sensation: diminished          5/4/22:    Right foot- Epic unable to load image  Right plantar hallux- probe to periosteum fibrotic base  Right plantar forefoot - wound with fibrotic base.   Left foot  Left plantar foot ulceration with necrotic base deep probe to bone        Laboratory:  CBC:   Recent Labs   Lab 05/04/22  0944   WBC 20.54*   RBC 2.62*   HGB 7.3*   HCT 21.3*   *   MCV 81*   MCH 27.9   MCHC 34.3     CMP:   Recent Labs   Lab 05/04/22  0944   *   CALCIUM 8.0*   ALBUMIN 2.1*   PROT 6.2   *   K 5.0   CO2 25   CL 85*   BUN 12   CREATININE 0.6   ALKPHOS 136*   ALT 14   AST 18   BILITOT 0.3       Diagnostic Results:  Xray:  X-Ray Foot Complete Right  Order: [id number]   Status: Final result     Visible to patient: Yes (not seen)     Next appt: 05/09/2022 at 02:00 PM in Gastroenterology (Saloni De Anda MD)     Dx: Infection     0 Result Notes    Details    Reading Physician Reading Date Result Priority   Josr Almanzar MD  [phone]  [phone] 5/4/2022 STAT     Narrative & Impression  EXAMINATION:  XR FOOT COMPLETE 3 VIEW RIGHT     CLINICAL HISTORY:  . Unspecified infectious disease     TECHNIQUE:  AP, lateral, and oblique views of the right foot were performed.     COMPARISON:  Right foot radiograph 04/20/2022.     FINDINGS:  No definite evidence of acute fracture or dislocation.  Lisfranc joint appears congruent.  There appears to be chronic appearing deformity at the 4th digit metatarsal head and neck with erosive change at the lateral aspect of the head.     Joint spaces appear to be maintained.  There is soft tissue swelling most pronounced at the dorsum of the mid and forefoot.  No definite radiopaque foreign body.  Enthesopathic change at the calcaneus.     Impression:     No definite evidence of acute fracture or dislocation.     MRI: pending B/L     Arterial US: ordered   Clinical Findings:  B/L ulceration probe to  periosteum right hallux plantar probe to bone left plantar foot.     Assessment/Plan:     There are no hospital problems to display for this patient.      MRI ordered B/L eval for OM, abscess  Aerobic culture obtained B/L    Arterial US ordered    Plan for OR debridement/ bone biopsy tomorrow. Case request placed for noon    NPO @ midnight     Thank you for your consult. I will follow-up with patient. Please contact us if you have any additional questions.    Halle Gill DPM  Podiatry  [facility] - [place] - Telemetry

## 2022-05-04 NOTE — ASSESSMENT & PLAN NOTE
Presents with hemoglobin of 7.3 with her hemoglobin being 9.7 just last week.  She does endorse some dark colored stool most notably 3 days ago.  She is on anticoagulation for blood clots.  She does have history of chronic gastritis as well.  Start on PPI IV and hold anticoagulation.  GI consulted for further evaluation

## 2022-05-04 NOTE — CONSULTS
[facility] Consultation Note    Patient Complaint: Abdominal pain    PCP:   Donaldo Pena       LOS: 0        Initial History of Present Illness (HPI):  This is a 51 y.o. female consulted to the GI service for melena, hgb drop and recently started on anticoagulants. Patient reports acute onset lower quadrant abdominal pain beginning a few days ago (patient unable to give exact time of symptom onset) followed by dark bloody loose stools. Keeps claiming that her family is stealing her medicine and putting things in her food. Her sodium is 118?? May be source of confusion, also has hx of Bipolar 1 disorder. Wbc-20.4 Hgb 7.3, plt-628, and being worked up for sepsis due to ongoing bilateral food wound issues. Had recent stay at [facility] 4/25/2022 where she was transferred from a behavioral health hospital for foot wounds and found to have BLE DVTs. Started on Eliquis and hgb 9.9 on admit during that stay.      Review of Systems   Constitutional: Negative for activity change, chills, diaphoresis and fever.   HENT: Negative for congestion, drooling, rhinorrhea, sore throat and trouble swallowing.    Eyes: Negative for discharge.   Respiratory: Negative for cough, shortness of breath and wheezing.    Cardiovascular: Negative for chest pain, palpitations and leg swelling.   Gastrointestinal: Positive for abdominal pain, blood in stool and diarrhea. Negative for abdominal distention, anal bleeding, constipation, nausea, rectal pain and vomiting.   Endocrine: Negative for cold intolerance and heat intolerance.   Genitourinary: Negative for frequency, hematuria and urgency.   Musculoskeletal: Negative for arthralgias.   Skin: Positive for wound. Negative for color change, pallor and rash.   Neurological: Positive for weakness. Negative for syncope, facial asymmetry and numbness.   Psychiatric/Behavioral: Positive for confusion. Negative for agitation. The patient is not nervous/anxious.             Medical History:  has a past medical history of ADHD (attention deficit hyperactivity disorder), Arthritis, Asthma, Bipolar 1 disorder, Cataract, Cigarette smoker, COPD (chronic obstructive pulmonary disease), Coronary artery disease, Depression, Diabetes mellitus, Diabetic foot ulcers, Diabetic neuropathy, DVT of lower extremity, bilateral (07/2013), Encounter for blood transfusion, History of blood clots (1. Left Leg=2003; 2.Bilateral Groin=Blood Clots= 5 or 6/ 2013 & 7/2013; 3. LLL of Lung=7/2013;  4. Lt. Lower Leg=7/2013. ), HTN (hypertension) (06/06/2013), Hypercholesteremia, Irregular heartbeat, Neuromuscular disorder, Obese, PE (pulmonary embolism) (07/2013), and Restless leg syndrome.    Surgical History:  has a past surgical history that includes Splenectomy, total (July 2003); Vein Surgery (2003); Green' s filter (Right, 7/4/2012); Tonsillectomy; Abdominal surgery (2010); Ovarian cyst removal (3/13/2014); Hernia repair; Bilateral oophorectomy (Bilateral, 1/12/2015); pr removal of ovary/tube(s); Tympanostomy tube placement (1976); Laparoscopic cholecystectomy (N/A, 9/10/2020); and Cholecystectomy.    Family History: family history includes Cataracts in her father; Diabetes in her father and paternal grandfather; Heart disease in her father and paternal grandfather; Hypertension in her father; No Known Problems in her brother, maternal aunt, maternal grandfather, maternal uncle, mother, paternal aunt, paternal uncle, and sister; Ovarian cancer in her maternal grandmother and paternal grandmother..     Social History:  reports that she has been smoking cigarettes. She has a 37.00 pack-year smoking history. She has never used smokeless tobacco. She reports that she does not drink alcohol and does not use drugs.    Review of patient's allergies indicates:   Allergen Reactions    Morphine Other (See Comments)     Patient had a psychotic episode after taking Morphine  Agitation, hallucinations     Penicillins Anaphylaxis     itching    Januvia [sitagliptin] Hives       No current facility-administered medications on file prior to encounter.     Current Outpatient Medications on File Prior to Encounter   Medication Sig Dispense Refill    acetaminophen (TYLENOL) 500 MG tablet Take 2 tablets (1,000 mg total) by mouth every 6 (six) hours as needed for Pain. 30 tablet 0    albuterol (PROVENTIL/VENTOLIN HFA) 90 mcg/actuation inhaler Inhale 2 puffs into the lungs every 6 (six) hours as needed for Wheezing. Use with spacer  Dispense with 1 spacer 18 g 0    albuterol-ipratropium (DUO-NEB) 2.5 mg-0.5 mg/3 mL nebulizer solution Take 3 mLs by nebulization every 6 (six) hours as needed for Wheezing or Shortness of Breath. Rescue 1 Box 0    apixaban (ELIQUIS) 5 mg Tab Take 1 tablet (5 mg total) by mouth 2 (two) times daily. 30 tablet 3    aspirin 81 MG Chew Take 1 tablet (81 mg total) by mouth once daily. 30 tablet 11    dicyclomine (BENTYL) 20 mg tablet Take 1 tablet (20 mg total) by mouth every 6 (six) hours. 30 tablet 0    divalproex (DEPAKOTE) 250 MG EC tablet Take 5 tablets (1,250 mg total) by mouth every evening. 150 tablet 11    divalproex (DEPAKOTE) 500 MG TbEC Take 1 tablet (500 mg total) by mouth once daily. PO QAM 30 tablet 11    doxycycline (VIBRAMYCIN) 100 MG Cap Take 1 capsule (100 mg total) by mouth every 12 (twelve) hours. for 8 days 16 capsule 0    DUPIXENT  mg/2 mL PnIj SMARTSI Milligram(s) SUB-Q Every 2 Weeks      EPITOL 200 mg tablet Take 200 mg by mouth 2 (two) times a day.      famotidine (PEPCID) 20 MG tablet Take 20 mg by mouth 2 (two) times daily.      fluticasone propionate (FLONASE) 50 mcg/actuation nasal spray 1 spray (50 mcg total) by Each Nostril route 2 (two) times daily. 16 g 0    furosemide (LASIX) 20 MG tablet TAKE 1 TABLET(20 MG) BY MOUTH EVERY DAY 90 tablet 1    gabapentin (NEURONTIN) 300 MG capsule TAKE 2 CAPSULES(600 MG) BY MOUTH TWICE DAILY 120 capsule 2     hydrOXYzine (ATARAX) 50 MG tablet Take 50 mg by mouth 4 (four) times daily as needed.      ibuprofen (ADVIL,MOTRIN) 600 MG tablet TAKE 1 TABLET(600 MG) BY MOUTH EVERY 8 HOURS AS NEEDED FOR PAIN OR BACK PAIN 90 tablet 0    lisinopriL 10 MG tablet Take 1 tablet (10 mg total) by mouth once daily. 90 tablet 3    loratadine (CLARITIN) 10 mg tablet Take 1 tablet (10 mg total) by mouth once daily. 60 tablet 0    magnesium oxide (MAG-OX) 400 mg (241.3 mg magnesium) tablet Take 1 tablet (400 mg total) by mouth 2 (two) times daily. 30 tablet 3    metFORMIN (GLUCOPHAGE) 1000 MG tablet TAKE 1 TABLET(1000 MG) BY MOUTH TWICE DAILY WITH MEALS 180 tablet 2    metoprolol tartrate (LOPRESSOR) 50 MG tablet TAKE 1 TABLET(50 MG) BY MOUTH TWICE DAILY 180 tablet 2    metroNIDAZOLE (FLAGYL) 500 MG tablet Take 1 tablet (500 mg total) by mouth every 8 (eight) hours. for 8 days 24 tablet 0    OLANZapine (ZYPREXA) 10 MG tablet Take 1 tablet (10 mg total) by mouth every 8 (eight) hours as needed (Agitation). 30 tablet 11    OPTICHAMBER BIGG LG MASK Spcr Inhale into the lungs.      pantoprazole (PROTONIX) 40 MG tablet Take 1 tablet (40 mg total) by mouth once daily. 30 tablet 0    polyvinyl alcohol, artificial tears, (LIQUIFILM TEARS) 1.4 % ophthalmic solution Place 1 drop into both eyes 4 (four) times daily. 15 mL 2    pravastatin (PRAVACHOL) 40 MG tablet TAKE 1 TABLET(40 MG) BY MOUTH EVERY EVENING 90 tablet 3    risperiDONE (RISPERDAL M-TABS) 3 MG disintegrating tablet Take 1 tablet (3 mg total) by mouth 2 (two) times daily. 60 tablet 11    senna (SENOKOT) 8.6 mg tablet Take 1 tablet by mouth 2 (two) times a day. 60 tablet 2    blood sugar diagnostic Strp To check BG two  times daily, to use with insurance preferred meter (Patient taking differently: To check BG two  times daily, to use with insurance preferred meter) 100 each 1    blood-glucose meter kit To check BG once daily, to use with insurance preferred meter 1  each 0    blood-glucose meter kit To check BG two times daily, to use with insurance preferred meter 1 each 0    fluticasone-salmeterol diskus inhaler 250-50 mcg Inhale 1 puff into the lungs 2 (two) times daily. Controller 60 each 2    hydrOXYzine pamoate (VISTARIL) 50 MG Cap Take 1 capsule (50 mg total) by mouth 3 (three) times daily. 90 capsule 2    lancets Misc To check BG two times daily, to use with insurance preferred meter 100 each 1    multivitamin Tab Take 1 tablet by mouth once daily. 30 tablet 2    TRUE METRIX GLUCOSE METER Misc CHECK BLOOD SUGAR TWICE DAILY 1 each 0    [DISCONTINUED] diclofenac sodium (VOLTAREN) 1 % Gel Apply 2 g topically 4 (four) times daily as needed (Apply to painful area up to 4 times a day as needed for pain). Apply to painful area 4 times a day as needed for pain 1 Tube 0    [DISCONTINUED] folic acid (FOLVITE) 1 MG tablet Take 1 tablet (1 mg total) by mouth once daily. 30 tablet 2    [DISCONTINUED] nystatin (NYSTOP) powder APPLY TOPICALLY TO AXILLA AND BREAST FOLDS TWICE DAILY 60 g 2    [DISCONTINUED] QUEtiapine (SEROQUEL) 200 MG Tab Take 1 tablet (200 mg total) by mouth before breakfast. 30 tablet 2        Objective Findings:    Vital Signs:  Temp:  [98.7 °F (37.1 °C)-99.5 °F (37.5 °C)]   Pulse:  [88-95]   Resp:  [16-20]   BP: (149-179)/(66-79)   SpO2:  [96 %-100 %]   Body mass index is 42.17 kg/m².      Physical Exam  Vitals and nursing note reviewed.   Constitutional:       Appearance: She is obese. She is ill-appearing.   HENT:      Head: Normocephalic.      Nose: Nose normal.      Mouth/Throat:      Mouth: Mucous membranes are dry.   Eyes:      Extraocular Movements: Extraocular movements intact.      Pupils: Pupils are equal, round, and reactive to light.   Cardiovascular:      Pulses: Normal pulses.      Heart sounds: Normal heart sounds.   Pulmonary:      Effort: Pulmonary effort is normal.      Breath sounds: Normal breath sounds.   Abdominal:      General:  Bowel sounds are normal.   Musculoskeletal:         General: Signs of injury present.      Cervical back: Normal range of motion.   Feet:      Right foot:      Skin integrity: Ulcer and skin breakdown present.      Left foot:      Skin integrity: Ulcer and skin breakdown present.   Skin:     General: Skin is warm and dry.      Findings: Wound present.   Neurological:      Mental Status: She is alert. She is confused.   Psychiatric:         Attention and Perception: Attention normal.         Mood and Affect: Affect is tearful.         Speech: Speech normal.         Behavior: Behavior normal.               Labs:  Lab Results   Component Value Date    WBC 20.54 (H) 05/04/2022    HGB 7.3 (L) 05/04/2022    HCT 21.3 (L) 05/04/2022     (H) 05/04/2022    CHOL 109 04/19/2021    TRIG 98 04/19/2021    HDL 46 04/19/2021    ALT 14 05/04/2022    AST 18 05/04/2022     (LL) 05/04/2022    K 5.0 05/04/2022    CL 85 (L) 05/04/2022    CREATININE 0.6 05/04/2022    BUN 12 05/04/2022    CO2 25 05/04/2022    TSH 1.815 04/07/2022    INR 1.0 04/19/2022    HGBA1C 7.0 (H) 04/13/2022                 Endoscopy: 2014 EGD- gastritis, normal esophagus, normal duodenum    I have independently reviewed and interpreted the imaging above    Assessment:  Patient is a .51 y.o. y/o .female with  has a past medical history of ADHD (attention deficit hyperactivity disorder), Arthritis, Asthma, Bipolar 1 disorder, Cataract, Cigarette smoker, COPD (chronic obstructive pulmonary disease), Coronary artery disease, Depression, Diabetes mellitus, Diabetic foot ulcers, Diabetic neuropathy, DVT of lower extremity, bilateral (07/2013), Encounter for blood transfusion, History of blood clots (1. Left Leg=2003; 2.Bilateral Groin=Blood Clots= 5 or 6/ 2013 & 7/2013; 3. LLL of Lung=7/2013;  4. Lt. Lower Leg=7/2013. ), HTN (hypertension) (06/06/2013), Hypercholesteremia, Irregular heartbeat, Neuromuscular disorder, Obese, PE (pulmonary embolism) (07/2013),  and Restless leg syndrome.  Consulted to the GI service for melena, hgb drop and recently started on anticoagulants.             Recommendations:  1.Acute blood loss anemia. Melena. Hyponatremia. Hold Eliquis. Will need to optimize sodium if true level of 118 and follow sepsis work up. Depending on levels. Tentative EGD on tomorrow.  2. Chronic gastritis.Abdominal pain. Continue PPI IV BID  3. Ok to have clear liquid diet today. Patient to be NPO past MN.  Please let us know if melena has worsened, any overt bleeding noted or converted to bright red blood.  .    Thank you so much for allowing me to participate in the care of Audrey Natarajan . Please contact us if you have any additional questions.    Raine Torres NP  Gastroenterology  [facility] - [place] - Med Surg

## 2022-05-05 LAB
ABO + RH BLD: NORMAL
ANION GAP SERPL CALC-SCNC: 10 MMOL/L (ref 8–16)
ANION GAP SERPL CALC-SCNC: 5 MMOL/L (ref 8–16)
ANION GAP SERPL CALC-SCNC: 7 MMOL/L (ref 8–16)
ANION GAP SERPL CALC-SCNC: 7 MMOL/L (ref 8–16)
ANION GAP SERPL CALC-SCNC: 8 MMOL/L (ref 8–16)
ANISOCYTOSIS BLD QL SMEAR: SLIGHT
BASOPHILS # BLD AUTO: 0.1 K/UL (ref 0–0.2)
BASOPHILS NFR BLD: 0.5 % (ref 0–1.9)
BLD GP AB SCN CELLS X3 SERPL QL: NORMAL
BUN SERPL-MCNC: 13 MG/DL (ref 6–20)
BUN SERPL-MCNC: 14 MG/DL (ref 6–20)
BUN SERPL-MCNC: 14 MG/DL (ref 6–20)
BURR CELLS BLD QL SMEAR: ABNORMAL
CALCIUM SERPL-MCNC: 7.4 MG/DL (ref 8.7–10.5)
CALCIUM SERPL-MCNC: 7.6 MG/DL (ref 8.7–10.5)
CALCIUM SERPL-MCNC: 7.7 MG/DL (ref 8.7–10.5)
CHLORIDE SERPL-SCNC: 85 MMOL/L (ref 95–110)
CHLORIDE SERPL-SCNC: 85 MMOL/L (ref 95–110)
CHLORIDE SERPL-SCNC: 86 MMOL/L (ref 95–110)
CHLORIDE SERPL-SCNC: 86 MMOL/L (ref 95–110)
CHLORIDE SERPL-SCNC: 87 MMOL/L (ref 95–110)
CO2 SERPL-SCNC: 21 MMOL/L (ref 23–29)
CO2 SERPL-SCNC: 22 MMOL/L (ref 23–29)
CO2 SERPL-SCNC: 22 MMOL/L (ref 23–29)
CO2 SERPL-SCNC: 24 MMOL/L (ref 23–29)
CO2 SERPL-SCNC: 25 MMOL/L (ref 23–29)
CREAT SERPL-MCNC: 0.7 MG/DL (ref 0.5–1.4)
DIFFERENTIAL METHOD: ABNORMAL
EOSINOPHIL # BLD AUTO: 0.3 K/UL (ref 0–0.5)
EOSINOPHIL NFR BLD: 1.8 % (ref 0–8)
ERYTHROCYTE [DISTWIDTH] IN BLOOD BY AUTOMATED COUNT: 16.2 % (ref 11.5–14.5)
EST. GFR  (AFRICAN AMERICAN): >60 ML/MIN/1.73 M^2
EST. GFR  (NON AFRICAN AMERICAN): >60 ML/MIN/1.73 M^2
GLUCOSE SERPL-MCNC: 114 MG/DL (ref 70–110)
GLUCOSE SERPL-MCNC: 136 MG/DL (ref 70–110)
GLUCOSE SERPL-MCNC: 150 MG/DL (ref 70–110)
GLUCOSE SERPL-MCNC: 90 MG/DL (ref 70–110)
GLUCOSE SERPL-MCNC: 92 MG/DL (ref 70–110)
HCT VFR BLD AUTO: 46.3 % (ref 37–48.5)
HGB BLD-MCNC: 15.5 G/DL (ref 12–16)
HGB BLD-MCNC: 6.8 G/DL (ref 12–16)
HYPOCHROMIA BLD QL SMEAR: ABNORMAL
IMM GRANULOCYTES # BLD AUTO: 0.39 K/UL (ref 0–0.04)
IMM GRANULOCYTES NFR BLD AUTO: 2 % (ref 0–0.5)
LYMPHOCYTES # BLD AUTO: 2.3 K/UL (ref 1–4.8)
LYMPHOCYTES NFR BLD: 12.1 % (ref 18–48)
MAGNESIUM SERPL-MCNC: 1.5 MG/DL (ref 1.6–2.6)
MCH RBC QN AUTO: 27.1 PG (ref 27–31)
MCHC RBC AUTO-ENTMCNC: 33.5 G/DL (ref 32–36)
MCV RBC AUTO: 81 FL (ref 82–98)
MONOCYTES # BLD AUTO: 2.2 K/UL (ref 0.3–1)
MONOCYTES NFR BLD: 11.2 % (ref 4–15)
NEUTROPHILS # BLD AUTO: 13.9 K/UL (ref 1.8–7.7)
NEUTROPHILS NFR BLD: 72.4 % (ref 38–73)
NRBC BLD-RTO: 0 /100 WBC
OSMOLALITY SERPL: 258 MOSM/KG (ref 275–295)
OSMOLALITY UR: 249 MOSM/KG (ref 50–1200)
PHOSPHATE SERPL-MCNC: 3.3 MG/DL (ref 2.7–4.5)
PLATELET # BLD AUTO: ABNORMAL K/UL (ref 150–450)
PLATELET BLD QL SMEAR: ABNORMAL
PMV BLD AUTO: ABNORMAL FL (ref 9.2–12.9)
POCT GLUCOSE: 101 MG/DL (ref 70–110)
POCT GLUCOSE: 149 MG/DL (ref 70–110)
POCT GLUCOSE: 99 MG/DL (ref 70–110)
POIKILOCYTOSIS BLD QL SMEAR: SLIGHT
POLYCHROMASIA BLD QL SMEAR: ABNORMAL
POTASSIUM SERPL-SCNC: 5.2 MMOL/L (ref 3.5–5.1)
POTASSIUM SERPL-SCNC: 5.2 MMOL/L (ref 3.5–5.1)
POTASSIUM SERPL-SCNC: 5.4 MMOL/L (ref 3.5–5.1)
POTASSIUM SERPL-SCNC: 5.7 MMOL/L (ref 3.5–5.1)
POTASSIUM SERPL-SCNC: 5.7 MMOL/L (ref 3.5–5.1)
RBC # BLD AUTO: 5.73 M/UL (ref 4–5.4)
SODIUM SERPL-SCNC: 113 MMOL/L (ref 136–145)
SODIUM SERPL-SCNC: 116 MMOL/L (ref 136–145)
SODIUM SERPL-SCNC: 116 MMOL/L (ref 136–145)
SODIUM SERPL-SCNC: 117 MMOL/L (ref 136–145)
SODIUM SERPL-SCNC: 118 MMOL/L (ref 136–145)
TARGETS BLD QL SMEAR: ABNORMAL
VANCOMYCIN TROUGH SERPL-MCNC: 8.4 UG/ML (ref 10–22)
WBC # BLD AUTO: 19.24 K/UL (ref 3.9–12.7)

## 2022-05-05 PROCEDURE — 99231 PR SUBSEQUENT HOSPITAL CARE,LEVL I: ICD-10-PCS | Mod: ,,, | Performed by: NURSE PRACTITIONER

## 2022-05-05 PROCEDURE — 27000207 HC ISOLATION

## 2022-05-05 PROCEDURE — C9113 INJ PANTOPRAZOLE SODIUM, VIA: HCPCS | Performed by: STUDENT IN AN ORGANIZED HEALTH CARE EDUCATION/TRAINING PROGRAM

## 2022-05-05 PROCEDURE — 36410 VNPNXR 3YR/> PHY/QHP DX/THER: CPT

## 2022-05-05 PROCEDURE — 85018 HEMOGLOBIN: CPT | Performed by: STUDENT IN AN ORGANIZED HEALTH CARE EDUCATION/TRAINING PROGRAM

## 2022-05-05 PROCEDURE — 25000003 PHARM REV CODE 250: Performed by: REGISTERED NURSE

## 2022-05-05 PROCEDURE — 21400001 HC TELEMETRY ROOM

## 2022-05-05 PROCEDURE — 86920 COMPATIBILITY TEST SPIN: CPT | Performed by: STUDENT IN AN ORGANIZED HEALTH CARE EDUCATION/TRAINING PROGRAM

## 2022-05-05 PROCEDURE — 36415 COLL VENOUS BLD VENIPUNCTURE: CPT | Performed by: ANESTHESIOLOGY

## 2022-05-05 PROCEDURE — 25000003 PHARM REV CODE 250: Performed by: STUDENT IN AN ORGANIZED HEALTH CARE EDUCATION/TRAINING PROGRAM

## 2022-05-05 PROCEDURE — 63600175 PHARM REV CODE 636 W HCPCS: Performed by: STUDENT IN AN ORGANIZED HEALTH CARE EDUCATION/TRAINING PROGRAM

## 2022-05-05 PROCEDURE — 25000003 PHARM REV CODE 250: Performed by: INTERNAL MEDICINE

## 2022-05-05 PROCEDURE — 99231 SBSQ HOSP IP/OBS SF/LOW 25: CPT | Mod: ,,, | Performed by: NURSE PRACTITIONER

## 2022-05-05 PROCEDURE — 86850 RBC ANTIBODY SCREEN: CPT | Performed by: STUDENT IN AN ORGANIZED HEALTH CARE EDUCATION/TRAINING PROGRAM

## 2022-05-05 PROCEDURE — 80202 ASSAY OF VANCOMYCIN: CPT | Performed by: EMERGENCY MEDICINE

## 2022-05-05 PROCEDURE — 94640 AIRWAY INHALATION TREATMENT: CPT

## 2022-05-05 PROCEDURE — 63600175 PHARM REV CODE 636 W HCPCS: Performed by: EMERGENCY MEDICINE

## 2022-05-05 PROCEDURE — C1751 CATH, INF, PER/CENT/MIDLINE: HCPCS

## 2022-05-05 PROCEDURE — 83735 ASSAY OF MAGNESIUM: CPT | Performed by: STUDENT IN AN ORGANIZED HEALTH CARE EDUCATION/TRAINING PROGRAM

## 2022-05-05 PROCEDURE — 25000003 PHARM REV CODE 250: Performed by: EMERGENCY MEDICINE

## 2022-05-05 PROCEDURE — 80048 BASIC METABOLIC PNL TOTAL CA: CPT | Mod: 91 | Performed by: STUDENT IN AN ORGANIZED HEALTH CARE EDUCATION/TRAINING PROGRAM

## 2022-05-05 PROCEDURE — 80048 BASIC METABOLIC PNL TOTAL CA: CPT | Mod: 91 | Performed by: ANESTHESIOLOGY

## 2022-05-05 PROCEDURE — 25000242 PHARM REV CODE 250 ALT 637 W/ HCPCS: Performed by: STUDENT IN AN ORGANIZED HEALTH CARE EDUCATION/TRAINING PROGRAM

## 2022-05-05 PROCEDURE — 84100 ASSAY OF PHOSPHORUS: CPT | Performed by: STUDENT IN AN ORGANIZED HEALTH CARE EDUCATION/TRAINING PROGRAM

## 2022-05-05 PROCEDURE — 85025 COMPLETE CBC W/AUTO DIFF WBC: CPT | Performed by: STUDENT IN AN ORGANIZED HEALTH CARE EDUCATION/TRAINING PROGRAM

## 2022-05-05 PROCEDURE — S0030 INJECTION, METRONIDAZOLE: HCPCS | Performed by: STUDENT IN AN ORGANIZED HEALTH CARE EDUCATION/TRAINING PROGRAM

## 2022-05-05 RX ORDER — SODIUM CHLORIDE 9 MG/ML
INJECTION, SOLUTION INTRAVENOUS CONTINUOUS
Status: DISCONTINUED | OUTPATIENT
Start: 2022-05-05 | End: 2022-05-07

## 2022-05-05 RX ORDER — HYDROCODONE BITARTRATE AND ACETAMINOPHEN 500; 5 MG/1; MG/1
TABLET ORAL
Status: DISCONTINUED | OUTPATIENT
Start: 2022-05-05 | End: 2022-05-13 | Stop reason: HOSPADM

## 2022-05-05 RX ORDER — LORAZEPAM 2 MG/ML
1 INJECTION INTRAMUSCULAR ONCE
Status: COMPLETED | OUTPATIENT
Start: 2022-05-05 | End: 2022-05-05

## 2022-05-05 RX ORDER — METRONIDAZOLE 500 MG/100ML
500 INJECTION, SOLUTION INTRAVENOUS
Status: DISCONTINUED | OUTPATIENT
Start: 2022-05-05 | End: 2022-05-10

## 2022-05-05 RX ORDER — CIPROFLOXACIN 2 MG/ML
400 INJECTION, SOLUTION INTRAVENOUS
Status: DISCONTINUED | OUTPATIENT
Start: 2022-05-05 | End: 2022-05-07

## 2022-05-05 RX ORDER — DOCUSATE SODIUM 100 MG/1
100 CAPSULE, LIQUID FILLED ORAL DAILY
Status: DISCONTINUED | OUTPATIENT
Start: 2022-05-05 | End: 2022-05-08

## 2022-05-05 RX ORDER — MAGNESIUM SULFATE HEPTAHYDRATE 40 MG/ML
2 INJECTION, SOLUTION INTRAVENOUS ONCE
Status: COMPLETED | OUTPATIENT
Start: 2022-05-05 | End: 2022-05-05

## 2022-05-05 RX ADMIN — SODIUM ZIRCONIUM CYCLOSILICATE 10 G: 10 POWDER, FOR SUSPENSION ORAL at 12:05

## 2022-05-05 RX ADMIN — OLANZAPINE 10 MG: 10 TABLET, FILM COATED ORAL at 06:05

## 2022-05-05 RX ADMIN — ASPIRIN 81 MG CHEWABLE TABLET 81 MG: 81 TABLET CHEWABLE at 10:05

## 2022-05-05 RX ADMIN — VANCOMYCIN HYDROCHLORIDE 1750 MG: 10 INJECTION, POWDER, LYOPHILIZED, FOR SOLUTION INTRAVENOUS at 10:05

## 2022-05-05 RX ADMIN — HYDROXYZINE PAMOATE 50 MG: 25 CAPSULE ORAL at 10:05

## 2022-05-05 RX ADMIN — HYDROXYZINE PAMOATE 50 MG: 25 CAPSULE ORAL at 03:05

## 2022-05-05 RX ADMIN — MAGNESIUM SULFATE 2 G: 2 INJECTION INTRAVENOUS at 10:05

## 2022-05-05 RX ADMIN — OXYCODONE AND ACETAMINOPHEN 1 TABLET: 7.5; 325 TABLET ORAL at 10:05

## 2022-05-05 RX ADMIN — PRAVASTATIN SODIUM 40 MG: 40 TABLET ORAL at 10:05

## 2022-05-05 RX ADMIN — SODIUM CHLORIDE: 0.9 INJECTION, SOLUTION INTRAVENOUS at 10:05

## 2022-05-05 RX ADMIN — METRONIDAZOLE 500 MG: 500 INJECTION, SOLUTION INTRAVENOUS at 10:05

## 2022-05-05 RX ADMIN — FLUTICASONE FUROATE AND VILANTEROL TRIFENATATE 1 PUFF: 100; 25 POWDER RESPIRATORY (INHALATION) at 08:05

## 2022-05-05 RX ADMIN — MUPIROCIN: 20 OINTMENT TOPICAL at 10:05

## 2022-05-05 RX ADMIN — SODIUM ZIRCONIUM CYCLOSILICATE 10 G: 10 POWDER, FOR SUSPENSION ORAL at 06:05

## 2022-05-05 RX ADMIN — DIVALPROEX SODIUM 500 MG: 250 TABLET, DELAYED RELEASE ORAL at 10:05

## 2022-05-05 RX ADMIN — DOCUSATE SODIUM 100 MG: 100 CAPSULE, LIQUID FILLED ORAL at 10:05

## 2022-05-05 RX ADMIN — LORAZEPAM 1 MG: 2 INJECTION INTRAMUSCULAR; INTRAVENOUS at 03:05

## 2022-05-05 RX ADMIN — RISPERIDONE 3 MG: 1 TABLET ORAL at 10:05

## 2022-05-05 RX ADMIN — PANTOPRAZOLE SODIUM 40 MG: 40 INJECTION, POWDER, FOR SOLUTION INTRAVENOUS at 10:05

## 2022-05-05 RX ADMIN — DIVALPROEX SODIUM 1250 MG: 250 TABLET, DELAYED RELEASE ORAL at 10:05

## 2022-05-05 RX ADMIN — DOXYCYCLINE 100 MG: 100 INJECTION, POWDER, LYOPHILIZED, FOR SOLUTION INTRAVENOUS at 02:05

## 2022-05-05 RX ADMIN — METOPROLOL TARTRATE 50 MG: 50 TABLET, FILM COATED ORAL at 10:05

## 2022-05-05 RX ADMIN — OXYCODONE AND ACETAMINOPHEN 1 TABLET: 7.5; 325 TABLET ORAL at 06:05

## 2022-05-05 RX ADMIN — RISPERIDONE 2 MG: 1 TABLET ORAL at 10:05

## 2022-05-05 NOTE — PLAN OF CARE
Notified of sodium of 116. Arrived with sodium of 118. Began NS at 100cc an hour at 1834. With repeat BMP checked at 1815.   Has not made urine. Serum osmolality in process.    Hyponantremia  Will continue NS at 100cc an hour as repeat BMP checked prior to therapy beginning.   Will obtain in and out catheterization for UA, urine sodium and urine osmolality  If no improvement on midnight check will move to ICU.      Teo Anderson PA-C  Department of Hospital Medicine   [facility]

## 2022-05-05 NOTE — HOSPITAL COURSE
Mrs. Natarajan was admitted with sepsis, hyponatremia, and diabetic foot wounds.    Regarding sepsis and DM foot wounds: She was started on IV antibiotics with a history of MRSA. Blood culture 5/5 with Staph aureus in 1/4 cultures, repeat blood cultures negative. Podiatry consulted. MRI of L foot shows Charcot changes, difficult to rule out septic arthritis. MRI of R foot shows cellulitis and osteomyelitis of 1st digit. Patient declines amputation. Bone biopsy performed 5/6 with results no growth. Plan for further debridement in OR. ID consulted. Arterial US noted to be abnormal. Vascular surgery consulted as well.  Vascular surgery recommending no intervention at this time unless wounds are unable to heal.    Regarding hyponatremia: Due to carbamazepine. Na 113 at lowest. Nephrology consulted and started IVF. Na is slowly correcting. Psychiatry consulted due to stopping medication for bipolar disorder and concern for psychiatric disease becoming uncontrolled as a result. OK to continue divalproex, risperidone. No need for PEC.  This has resolved.    She has also had anemia. Required transfusion 1U RBC on 5/5. No active GI bleeding noted. Resumed eliquis for bilateral DVTs noted 1 month ago.  Remained stable at this time.    Underwent debridement on 5/10 with Podiatry, plan to continue Vancomycin until at least June 14th.    She is to continue non will weight-bearing to heels only with Darco shoots for transfers only.  Patient was transferred to [facility] to continue with Podiatry, wound care, IV antibiotics.   Followed protocol/Zinacef q8

## 2022-05-05 NOTE — CONSULTS
Renal on Call:  Received consult for hypoNa+  52 y/o female with DM2 and bipolar disorder admitted for LE wound infection.  Will get Uosm and if low will start IV n.s.  Also restrict p.o. fluids, check a.m. Cortisol, T4 and TSH.  Full Consult to follow.

## 2022-05-05 NOTE — NURSING
Bedside shift report received from Diann WEEMS. Patient resting in bed with eyes closed.  Rise and fall noted to chest.  No signs of distress noted.  Call light within reach.  Patient placed in contact precautions due to MRSA.  Will continue to monitor.

## 2022-05-05 NOTE — NURSING
"Dr. Desozua responded to the nurse and stated " No need to call we will with sodium results Q6hrs already aware of the issue and taking care of it.  Nurse put in nursing order.   "

## 2022-05-05 NOTE — PROGRESS NOTES
[facility] Medicine  Progress Note    Patient Name: Audrey Natarajan  MRN: [id number]  Patient Class: IP- Inpatient   Admission Date: 5/4/2022  Length of Stay: 1 days  Attending Physician: Scott Fuller MD  Primary Care Provider: Donaldo Pena MD        Subjective:     Principal Problem:Diabetic foot ulcer associated with type 2 diabetes mellitus        HPI:  Ms. Natarajan is a 51-year-old female with extensive past medical history including type 2 diabetes, hypertension, CKD, bipolar disorder, and recurrent foot infections who presents to the emergency department for evaluation of pain and worsening redness of her foot. Patient recently discharged from [facility] on April 26 after a complicated hospital stay due to psychosis and diabetic foot ulcer growing MRSA.  She was discharged home with her stepdaughter to continue antibiotics and follow-up.  The patient now states that her stepdaughter had taken her medications and hid them from her.  She states she just found her medications 3 days ago and started retaking her antibiotics but in the meantime, her feet wounds have worsened and she has noticed increased swelling and redness going up her legs.  She is also in significant pain.  She is very tearful.  She has felt feverish but no chills.  She denies any urinary symptoms.    In the emergency department, she was found to significant foot wounds.  See media tab.  She was also found to significant lab abnormalities including a WBC count of 20.5 1000, a sodium of 118, procalcitonin 0.34,  and . She was started IV antibiotics to cover her history of MRSA.  Medications were reviewed from previous hospital stay and restarted.  Podiatry was consulted.  She was admitted to hospital medicine for further management.      I spoke with her sister Anitra, and patient has not been taking her medications and has been accusing her stepdaughter - these are not true statements, she  feels like she is not able to take care of herself.       Overview/Hospital Course:  Mrs. Natarajan was admitted with sepsis, hyponatremia, and diabetic foot wounds.  She was started on IV antibiotics with a history of MRSA.  Podiatry consulted.  There is also concerns for possible GI bleed as her hemoglobin was 7.3 and she has been on Xarelto.  Patient has a long history of psych disorder and is on multiple medications with the recent long hospitalization due to psychosis.  Her sister states patient has not been taking her medicine as prescribed and is having delusions that her daughter is taking her medicine.  Hyponatremia not improving, and becoming worse.  Carbamazepine has been stopped.  She is fluid restricted.  Nephrology consulted for management of hyponatremia.  GI consulted as well for concerns of GI bleed.  Plan is for debridement of her left and right foot as well as upper endoscopy the anesthesia is hoping for improvement in sodium prior.      Interval History:  Patient is crying and very tearful today.  She states that her feet and her back hurts.  She was just given pain medication a few minutes prior.  She also tells me that she is hungry    Review of Systems   Constitutional:  Positive for fever. Negative for activity change and chills.   HENT:  Negative for congestion, rhinorrhea, sinus pressure and trouble swallowing.    Eyes:  Negative for visual disturbance.   Respiratory:  Negative for cough and shortness of breath.    Cardiovascular:  Positive for leg swelling. Negative for chest pain.   Gastrointestinal:  Negative for abdominal pain, constipation, diarrhea, nausea and vomiting.        Black stool   Endocrine: Negative for polyuria.   Genitourinary:  Negative for dysuria, frequency, hematuria and vaginal pain.   Musculoskeletal:  Positive for arthralgias and myalgias.   Skin:  Positive for rash and wound.   Neurological:  Negative for dizziness, seizures, weakness and headaches.    Psychiatric/Behavioral:  Positive for behavioral problems. The patient is nervous/anxious.    Objective:     Vital Signs (Most Recent):  Temp: 99.5 °F (37.5 °C) (05/05/22 1158)  Pulse: 71 (05/05/22 1158)  Resp: 18 (05/05/22 1158)  BP: (!) 137/59 (05/05/22 1158)  SpO2: 97 % (05/05/22 1158)   Vital Signs (24h Range):  Temp:  [97.7 °F (36.5 °C)-99.5 °F (37.5 °C)] 99.5 °F (37.5 °C)  Pulse:  [71-88] 71  Resp:  [18-20] 18  SpO2:  [93 %-98 %] 97 %  BP: (101-164)/(57-74) 137/59     Weight: 118.5 kg (261 lb 3.9 oz)  Body mass index is 42.17 kg/m².    Intake/Output Summary (Last 24 hours) at 5/5/2022 1242  Last data filed at 5/4/2022 2235  Gross per 24 hour   Intake 800.42 ml   Output 750 ml   Net 50.42 ml      Physical Exam  Vitals and nursing note reviewed.   Constitutional:       Appearance: Normal appearance. She is ill-appearing.   HENT:      Head: Normocephalic and atraumatic.      Mouth/Throat:      Mouth: Mucous membranes are dry.      Pharynx: Oropharynx is clear.   Eyes:      General: No scleral icterus.     Conjunctiva/sclera: Conjunctivae normal.   Cardiovascular:      Rate and Rhythm: Normal rate and regular rhythm.      Pulses: Normal pulses.      Heart sounds: Normal heart sounds.   Pulmonary:      Effort: Pulmonary effort is normal. No respiratory distress.      Breath sounds: Normal breath sounds. No wheezing or rales.   Abdominal:      General: Bowel sounds are normal. There is no distension.      Palpations: Abdomen is soft.      Tenderness: There is no abdominal tenderness.   Musculoskeletal:         General: Swelling present.      Comments: Has swelling in bilateral lower extremities but non-pitting   Skin:     Comments: See pictures   Neurological:      Mental Status: She is alert.   Psychiatric:         Attention and Perception: Attention normal.         Mood and Affect: Mood is depressed. Affect is tearful.         Speech: Speech normal.         Behavior: Behavior is not agitated. Behavior is  "cooperative.       Significant Labs: All pertinent labs within the past 24 hours have been reviewed.    Significant Imaging: I have reviewed all pertinent imaging results/findings within the past 24 hours.      Assessment/Plan:      * Diabetic foot ulcer associated with type 2 diabetes mellitus  She has a chronic left plantar aspect wound that has been treated in the past but has been non-compliant with keeping off of it and taking her antibiotics.  Recent hospital stay growing MRSA.  Podiatry following and planning for MRI.  She now has wounds to the bottom of her right foot that is concerning as well.  - appreciate Podiatry recommendations  Will follow-up MRI continue IV antibiotics for now.      Hyponatremia  Presents with a sodium of 118 which is new.  She is currently asymptomatic.  Hypotonic hyponatremia - holding carbamazepine thought appears she was not taking this  - check urine sodium, serum osmalality  - Urine Na <20, started on normal saline with no improvement  - fluid restriction, consult Nephrology for help with management  - monitor BMP q6h          Sepsis due to methicillin resistant Staphylococcus aureus (MRSA)  This patient does have evidence of infective focus  My overall impression is sepsis. Vital signs were reviewed and noted in progress note.  Antibiotics given-   Antibiotics (From admission, onward)            Start     Stop Route Frequency Ordered    05/05/22 1415  ciprofloxacin (CIPRO)400mg/200ml D5W IVPB 400 mg         -- IV Every 12 hours (non-standard times) 05/05/22 1311    05/05/22 1415  metronidazole IVPB 500 mg         -- IV Every 8 hours (non-standard times) 05/05/22 1311    05/04/22 2230  vancomycin (VANCOCIN) 1,750 mg in dextrose 5 % 500 mL IVPB         -- IV Every 12 hours (non-standard times) 05/04/22 1148    05/04/22 1245  mupirocin 2 % ointment         05/09 0859 Nasl 2 times daily 05/04/22 1135    05/04/22 1103  vancomycin - pharmacy to dose  (vancomycin IVPB)        "And" " Linked Group Details    -- IV pharmacy to manage frequency 05/04/22 1004        Cultures were taken-   Microbiology Results (last 7 days)     Procedure Component Value Units Date/Time    Aerobic culture [[id number]]  (Abnormal) Collected: 05/04/22 1500    Order Status: Completed Specimen: Wound from Foot, Left Updated: 05/05/22 1232     Aerobic Bacterial Culture STAPHYLOCOCCUS AUREUS  Many  Susceptibility pending      Aerobic culture [[id number]]  (Abnormal) Collected: 05/04/22 1500    Order Status: Completed Specimen: Wound from Foot, Right Updated: 05/05/22 1227     Aerobic Bacterial Culture GRAM NEGATIVE PEEWEE  >100,000 cfu/ml  Identification and susceptibility pending        GRAM NEGATIVE PEEWEE  Moderate  Identification and susceptibility pending        STAPHYLOCOCCUS AUREUS  Many  Susceptibility pending      Blood culture x two cultures. Draw prior to antibiotics. [[id number]] Collected: 05/04/22 0947    Order Status: Completed Specimen: Blood from Peripheral, Antecubital, Left Updated: 05/05/22 1103     Blood Culture, Routine No Growth to date      No Growth to date    Narrative:      Aerobic and anaerobic    Blood culture x two cultures. Draw prior to antibiotics. [[id number]] Collected: 05/04/22 0944    Order Status: Completed Specimen: Blood from Peripheral, Antecubital, Right Updated: 05/05/22 0959     Blood Culture, Routine Gram stain abbe bottle: Gram positive cocci in clusters resembling Staph       Results called to and read back by: Linnette Calix- NIA 05/05/2022        09:58    Narrative:      Aerobic and anaerobic        Latest lactate reviewed, they are-  Recent Labs   Lab 05/04/22 0944 05/04/22  1144   LACTATE 0.8 0.8         Source- bilateral foot wounds    Source control Achieved by- IV antibiotics, debridement with Podiatry      Anemia  Presents with hemoglobin of 7.3 with her hemoglobin being 9.7 just last week.  She does endorse some dark colored stool most notably 3 days ago.  She is on  anticoagulation for blood clots.  She does have history of chronic gastritis as well.  Start on PPI IV and hold anticoagulation.  GI consulted for further evaluation  - Planning for EGD - now HB 15?? Recheck, likely spurious      Acute deep vein thrombosis (DVT) of both lower extremities  - diagnosed at last hospital stay  - holding anticoagulation for concern for GI bleed      Cellulitis of lower extremity  Was recently diagnosed with a diabetic foot ulcer last month and was treated however has not been taking her medications at home and has worsening redness and swelling bilateral lower extremities.  Last culture growing MRSA.  She is on IV doxycycline and vancomycin for now.  Podiatry has been consulted for diabetic foot wound.      Hypercoagulable state  Holding anticoagulation due to concerns for bleeding    Obesity  Body mass index is 42.17 kg/m². Morbid obesity complicates all aspects of disease management from diagnostic modalities to treatment. Weight loss encouraged and health benefits explained to patient.         Leukocytosis  Likely due to foot wounds  - see sepsis      Bipolar 1 disorder  Long history of bipolar 1 disorder with recent psychosis at last hospital stay.  Will resume regimen Risperidone, Depakote.  Holding carbamazepine at the moment due to hyponatremia  - sister Crystal states patient has not been compliant with her medications and feels that she still has some paranoia that her stepdaughter had been hiding her medications though she believes it was the patient herself.  - may need Psych consult prior to d/c, continue monitoring      COPD (chronic obstructive pulmonary disease)  Stable, resume home medications        VTE Risk Mitigation (From admission, onward)         Ordered     IP VTE HIGH RISK PATIENT  Once         05/04/22 1259     Place sequential compression device  Until discontinued         05/04/22 1259     Place sequential compression device  Until discontinued         05/04/22  1259                Discharge Planning   HUMBERTO:      Code Status: Full Code   Is the patient medically ready for discharge?:     Reason for patient still in hospital (select all that apply): Treatment                     Scott Fuller MD  Department of [facility] Medicine   [facility]

## 2022-05-05 NOTE — ASSESSMENT & PLAN NOTE
She has a chronic left plantar aspect wound that has been treated in the past but has been non-compliant with keeping off of it and taking her antibiotics.  Recent hospital stay growing MRSA.  Podiatry following and planning for MRI.  She now has wounds to the bottom of her right foot that is concerning as well.  - appreciate Podiatry recommendations  Will follow-up MRI continue IV antibiotics for now.

## 2022-05-05 NOTE — PLAN OF CARE
Pt is AAOx4. Room air. Tele maintained. BG WNL.  No falls or new injuries reported during shift, safety precautions maintained.     Problem: Adult Inpatient Plan of Care  Goal: Plan of Care Review  Outcome: Ongoing, Progressing     Problem: Adult Inpatient Plan of Care  Goal: Optimal Comfort and Wellbeing  Outcome: Ongoing, Progressing

## 2022-05-05 NOTE — NURSING
Nurse tried to notify the patients sister about blood transfusion.  No answer at this time.  Doctor was able to obtain consent for blood transfusion.

## 2022-05-05 NOTE — NURSING
Notified by day shift nurse, Critical NA of 116.  Showers PA made aware.   Pt placed on 1200 cc fluid restriction per PA.  In and out performed, urine collected and sent to lab.

## 2022-05-05 NOTE — ASSESSMENT & PLAN NOTE
"This patient does have evidence of infective focus  My overall impression is sepsis. Vital signs were reviewed and noted in progress note.  Antibiotics given-   Antibiotics (From admission, onward)            Start     Stop Route Frequency Ordered    05/05/22 1415  ciprofloxacin (CIPRO)400mg/200ml D5W IVPB 400 mg         -- IV Every 12 hours (non-standard times) 05/05/22 1311    05/05/22 1415  metronidazole IVPB 500 mg         -- IV Every 8 hours (non-standard times) 05/05/22 1311    05/04/22 2230  vancomycin (VANCOCIN) 1,750 mg in dextrose 5 % 500 mL IVPB         -- IV Every 12 hours (non-standard times) 05/04/22 1148    05/04/22 1245  mupirocin 2 % ointment         05/09 0859 Nasl 2 times daily 05/04/22 1135    05/04/22 1103  vancomycin - pharmacy to dose  (vancomycin IVPB)        "And" Linked Group Details    -- IV pharmacy to manage frequency 05/04/22 1004        Cultures were taken-   Microbiology Results (last 7 days)     Procedure Component Value Units Date/Time    Aerobic culture [[id number]]  (Abnormal) Collected: 05/04/22 1500    Order Status: Completed Specimen: Wound from Foot, Left Updated: 05/05/22 1232     Aerobic Bacterial Culture STAPHYLOCOCCUS AUREUS  Many  Susceptibility pending      Aerobic culture [[id number]]  (Abnormal) Collected: 05/04/22 1500    Order Status: Completed Specimen: Wound from Foot, Right Updated: 05/05/22 1227     Aerobic Bacterial Culture GRAM NEGATIVE PEEWEE  >100,000 cfu/ml  Identification and susceptibility pending        GRAM NEGATIVE PEEWEE  Moderate  Identification and susceptibility pending        STAPHYLOCOCCUS AUREUS  Many  Susceptibility pending      Blood culture x two cultures. Draw prior to antibiotics. [[id number]] Collected: 05/04/22 0947    Order Status: Completed Specimen: Blood from Peripheral, Antecubital, Left Updated: 05/05/22 1103     Blood Culture, Routine No Growth to date      No Growth to date    Narrative:      Aerobic and anaerobic    Blood culture x two " cultures. Draw prior to antibiotics. [[id number]] Collected: 05/04/22 0944    Order Status: Completed Specimen: Blood from Peripheral, Antecubital, Right Updated: 05/05/22 0959     Blood Culture, Routine Gram stain abbe bottle: Gram positive cocci in clusters resembling Staph       Results called to and read back by: Linnette Calix- 3W 05/05/2022        09:58    Narrative:      Aerobic and anaerobic        Latest lactate reviewed, they are-  Recent Labs   Lab 05/04/22 0944 05/04/22  1144   LACTATE 0.8 0.8         Source- bilateral foot wounds    Source control Achieved by- IV antibiotics, debridement with Podiatry
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
## 2022-05-05 NOTE — PLAN OF CARE
MRIs pending which will assist with surgical planning    Plan for podiatric surgery when patient stable    Nursing orders for Q shift dressing changes to initiate this morning

## 2022-05-05 NOTE — PROGRESS NOTES
[facility] Progress Note    Patient Complaint: Abdominal pain    PCP:   Donaldo Pena       LOS: 1        Initial History of Present Illness (HPI):  This is a 51 y.o. female consulted to the GI service for melena, hgb drop and recently started on anticoagulants. Patient reports acute onset lower quadrant abdominal pain beginning a few days ago (patient unable to give exact time of symptom onset) followed by dark bloody loose stools. Keeps claiming that her family is stealing her medicine and putting things in her food. Her sodium is 118?? May be source of confusion, also has hx of Bipolar 1 disorder. Wbc-20.4 Hgb 7.3, plt-628, and being worked up for sepsis due to ongoing bilateral food wound issues. Had recent stay at [facility] 4/25/2022 where she was transferred from a behavioral health hospital for foot wounds and found to have BLE DVTs. Started on Eliquis and hgb 9.9 on admit during that stay.    Interval History:  Patient seen and examined today in bed. AM chem labs without improvement. Sodium 113. K 5.7. Hgb normalized. Mentation unchanged. Denies any bowel movements or BRBPR overnight. No acute signs of distress noted. No acute events reported over night. VSS, afebrile. Podiatry planning for procedure today.      Review of Systems   Constitutional: Negative for activity change, chills, diaphoresis and fever.   HENT: Negative for congestion, drooling, rhinorrhea, sore throat and trouble swallowing.    Eyes: Negative for discharge.   Respiratory: Negative for cough, shortness of breath and wheezing.    Cardiovascular: Negative for chest pain, palpitations and leg swelling.   Gastrointestinal: Positive for abdominal pain, blood in stool and diarrhea. Negative for abdominal distention, anal bleeding, constipation, nausea, rectal pain and vomiting.   Endocrine: Negative for cold intolerance and heat intolerance.   Genitourinary: Negative for frequency, hematuria and urgency.    Musculoskeletal: Negative for arthralgias.   Skin: Positive for wound. Negative for color change, pallor and rash.   Neurological: Positive for weakness. Negative for syncope, facial asymmetry and numbness.   Psychiatric/Behavioral: Positive for confusion. Negative for agitation. The patient is not nervous/anxious.            Medical History:  has a past medical history of ADHD (attention deficit hyperactivity disorder), Arthritis, Asthma, Bipolar 1 disorder, Cataract, Cigarette smoker, COPD (chronic obstructive pulmonary disease), Coronary artery disease, Depression, Diabetes mellitus, Diabetic foot ulcers, Diabetic neuropathy, DVT of lower extremity, bilateral (07/2013), Encounter for blood transfusion, History of blood clots (1. Left Leg=2003; 2.Bilateral Groin=Blood Clots= 5 or 6/ 2013 & 7/2013; 3. LLL of Lung=7/2013;  4. Lt. Lower Leg=7/2013. ), HTN (hypertension) (06/06/2013), Hypercholesteremia, Irregular heartbeat, Neuromuscular disorder, Obese, PE (pulmonary embolism) (07/2013), and Restless leg syndrome.    Surgical History:  has a past surgical history that includes Splenectomy, total (July 2003); Vein Surgery (2003); Green' s filter (Right, 7/4/2012); Tonsillectomy; Abdominal surgery (2010); Ovarian cyst removal (3/13/2014); Hernia repair; Bilateral oophorectomy (Bilateral, 1/12/2015); pr removal of ovary/tube(s); Tympanostomy tube placement (1976); Laparoscopic cholecystectomy (N/A, 9/10/2020); and Cholecystectomy.    Family History: family history includes Cataracts in her father; Diabetes in her father and paternal grandfather; Heart disease in her father and paternal grandfather; Hypertension in her father; No Known Problems in her brother, maternal aunt, maternal grandfather, maternal uncle, mother, paternal aunt, paternal uncle, and sister; Ovarian cancer in her maternal grandmother and paternal grandmother..     Social History:  reports that she has been smoking cigarettes. She has a 37.00  pack-year smoking history. She has never used smokeless tobacco. She reports that she does not drink alcohol and does not use drugs.    Review of patient's allergies indicates:   Allergen Reactions    Morphine Other (See Comments)     Patient had a psychotic episode after taking Morphine  Agitation, hallucinations    Penicillins Anaphylaxis     itching    Januvia [sitagliptin] Hives       No current facility-administered medications on file prior to encounter.     Current Outpatient Medications on File Prior to Encounter   Medication Sig Dispense Refill    acetaminophen (TYLENOL) 500 MG tablet Take 2 tablets (1,000 mg total) by mouth every 6 (six) hours as needed for Pain. 30 tablet 0    albuterol (PROVENTIL/VENTOLIN HFA) 90 mcg/actuation inhaler Inhale 2 puffs into the lungs every 6 (six) hours as needed for Wheezing. Use with spacer  Dispense with 1 spacer 18 g 0    albuterol-ipratropium (DUO-NEB) 2.5 mg-0.5 mg/3 mL nebulizer solution Take 3 mLs by nebulization every 6 (six) hours as needed for Wheezing or Shortness of Breath. Rescue 1 Box 0    apixaban (ELIQUIS) 5 mg Tab Take 1 tablet (5 mg total) by mouth 2 (two) times daily. 30 tablet 3    aspirin 81 MG Chew Take 1 tablet (81 mg total) by mouth once daily. 30 tablet 11    dicyclomine (BENTYL) 20 mg tablet Take 1 tablet (20 mg total) by mouth every 6 (six) hours. 30 tablet 0    divalproex (DEPAKOTE) 250 MG EC tablet Take 5 tablets (1,250 mg total) by mouth every evening. 150 tablet 11    divalproex (DEPAKOTE) 500 MG TbEC Take 1 tablet (500 mg total) by mouth once daily. PO QAM 30 tablet 11    [] doxycycline (VIBRAMYCIN) 100 MG Cap Take 1 capsule (100 mg total) by mouth every 12 (twelve) hours. for 8 days 16 capsule 0    DUPIXENT  mg/2 mL PnIj SMARTSI Milligram(s) SUB-Q Every 2 Weeks      EPITOL 200 mg tablet Take 200 mg by mouth 2 (two) times a day.      famotidine (PEPCID) 20 MG tablet Take 20 mg by mouth 2 (two) times daily.       fluticasone propionate (FLONASE) 50 mcg/actuation nasal spray 1 spray (50 mcg total) by Each Nostril route 2 (two) times daily. 16 g 0    furosemide (LASIX) 20 MG tablet TAKE 1 TABLET(20 MG) BY MOUTH EVERY DAY 90 tablet 1    gabapentin (NEURONTIN) 300 MG capsule TAKE 2 CAPSULES(600 MG) BY MOUTH TWICE DAILY 120 capsule 2    hydrOXYzine (ATARAX) 50 MG tablet Take 50 mg by mouth 4 (four) times daily as needed.      ibuprofen (ADVIL,MOTRIN) 600 MG tablet TAKE 1 TABLET(600 MG) BY MOUTH EVERY 8 HOURS AS NEEDED FOR PAIN OR BACK PAIN 90 tablet 0    lisinopriL 10 MG tablet Take 1 tablet (10 mg total) by mouth once daily. 90 tablet 3    loratadine (CLARITIN) 10 mg tablet Take 1 tablet (10 mg total) by mouth once daily. 60 tablet 0    magnesium oxide (MAG-OX) 400 mg (241.3 mg magnesium) tablet Take 1 tablet (400 mg total) by mouth 2 (two) times daily. 30 tablet 3    metFORMIN (GLUCOPHAGE) 1000 MG tablet TAKE 1 TABLET(1000 MG) BY MOUTH TWICE DAILY WITH MEALS 180 tablet 2    metoprolol tartrate (LOPRESSOR) 50 MG tablet TAKE 1 TABLET(50 MG) BY MOUTH TWICE DAILY 180 tablet 2    [] metroNIDAZOLE (FLAGYL) 500 MG tablet Take 1 tablet (500 mg total) by mouth every 8 (eight) hours. for 8 days 24 tablet 0    OLANZapine (ZYPREXA) 10 MG tablet Take 1 tablet (10 mg total) by mouth every 8 (eight) hours as needed (Agitation). 30 tablet 11    OPTICHAMBER BIGG LG MASK Spcr Inhale into the lungs.      pantoprazole (PROTONIX) 40 MG tablet Take 1 tablet (40 mg total) by mouth once daily. 30 tablet 0    polyvinyl alcohol, artificial tears, (LIQUIFILM TEARS) 1.4 % ophthalmic solution Place 1 drop into both eyes 4 (four) times daily. 15 mL 2    pravastatin (PRAVACHOL) 40 MG tablet TAKE 1 TABLET(40 MG) BY MOUTH EVERY EVENING 90 tablet 3    risperiDONE (RISPERDAL M-TABS) 3 MG disintegrating tablet Take 1 tablet (3 mg total) by mouth 2 (two) times daily. 60 tablet 11    senna (SENOKOT) 8.6 mg tablet Take 1 tablet by  mouth 2 (two) times a day. 60 tablet 2    blood sugar diagnostic Strp To check BG two  times daily, to use with insurance preferred meter (Patient taking differently: To check BG two  times daily, to use with insurance preferred meter) 100 each 1    blood-glucose meter kit To check BG once daily, to use with insurance preferred meter 1 each 0    blood-glucose meter kit To check BG two times daily, to use with insurance preferred meter 1 each 0    fluticasone-salmeterol diskus inhaler 250-50 mcg Inhale 1 puff into the lungs 2 (two) times daily. Controller 60 each 2    hydrOXYzine pamoate (VISTARIL) 50 MG Cap Take 1 capsule (50 mg total) by mouth 3 (three) times daily. 90 capsule 2    lancets Misc To check BG two times daily, to use with insurance preferred meter 100 each 1    multivitamin Tab Take 1 tablet by mouth once daily. 30 tablet 2    TRUE METRIX GLUCOSE METER Misc CHECK BLOOD SUGAR TWICE DAILY 1 each 0    [DISCONTINUED] diclofenac sodium (VOLTAREN) 1 % Gel Apply 2 g topically 4 (four) times daily as needed (Apply to painful area up to 4 times a day as needed for pain). Apply to painful area 4 times a day as needed for pain 1 Tube 0    [DISCONTINUED] nystatin (NYSTOP) powder APPLY TOPICALLY TO AXILLA AND BREAST FOLDS TWICE DAILY 60 g 2    [DISCONTINUED] QUEtiapine (SEROQUEL) 200 MG Tab Take 1 tablet (200 mg total) by mouth before breakfast. 30 tablet 2        Objective Findings:    Vital Signs:  Temp:  [97.7 °F (36.5 °C)-99.5 °F (37.5 °C)]   Pulse:  [71-95]   Resp:  [16-20]   BP: (101-179)/(57-79)   SpO2:  [93 %-100 %]   Body mass index is 42.17 kg/m².      Physical Exam  Vitals and nursing note reviewed.   Constitutional:       Appearance: She is obese. She is ill-appearing.   HENT:      Head: Normocephalic.      Nose: Nose normal.      Mouth/Throat:      Mouth: Mucous membranes are dry.   Eyes:      Extraocular Movements: Extraocular movements intact.      Pupils: Pupils are equal, round, and  reactive to light.   Cardiovascular:      Pulses: Normal pulses.      Heart sounds: Normal heart sounds.   Pulmonary:      Effort: Pulmonary effort is normal.      Breath sounds: Normal breath sounds.   Abdominal:      General: Bowel sounds are normal.   Musculoskeletal:         General: Signs of injury present.      Cervical back: Normal range of motion.   Feet:      Right foot:      Skin integrity: Ulcer and skin breakdown present.      Left foot:      Skin integrity: Ulcer and skin breakdown present.   Skin:     General: Skin is warm and dry.      Findings: Wound present.   Neurological:      Mental Status: She is alert. She is confused.   Psychiatric:         Attention and Perception: Attention normal.         Mood and Affect: Affect is tearful.         Speech: Speech normal.         Behavior: Behavior normal.               Labs:  Lab Results   Component Value Date    WBC 19.24 (H) 05/05/2022    HGB 15.5 05/05/2022    HCT 46.3 05/05/2022    PLT SEE COMMENT 05/05/2022    CHOL 109 04/19/2021    TRIG 98 04/19/2021    HDL 46 04/19/2021    ALT 14 05/04/2022    AST 18 05/04/2022     (LL) 05/05/2022    K 5.7 (H) 05/05/2022    CL 85 (L) 05/05/2022    CREATININE 0.7 05/05/2022    BUN 13 05/05/2022    CO2 21 (L) 05/05/2022    TSH 1.815 04/07/2022    INR 1.0 04/19/2022    HGBA1C 7.0 (H) 04/13/2022                 Endoscopy: 2014 EGD- gastritis, normal esophagus, normal duodenum    I have independently reviewed and interpreted the imaging above    Assessment:  Patient is a .51 y.o. y/o .female with  has a past medical history of ADHD (attention deficit hyperactivity disorder), Arthritis, Asthma, Bipolar 1 disorder, Cataract, Cigarette smoker, COPD (chronic obstructive pulmonary disease), Coronary artery disease, Depression, Diabetes mellitus, Diabetic foot ulcers, Diabetic neuropathy, DVT of lower extremity, bilateral (07/2013), Encounter for blood transfusion, History of blood clots (1. Left Leg=2003; 2.Bilateral  Groin=Blood Clots= 5 or 6/ 2013 & 7/2013; 3. LLL of Lung=7/2013;  4. Lt. Lower Leg=7/2013. ), HTN (hypertension) (06/06/2013), Hypercholesteremia, Irregular heartbeat, Neuromuscular disorder, Obese, PE (pulmonary embolism) (07/2013), and Restless leg syndrome.  Consulted to the GI service for melena, hgb drop and recently started on anticoagulants.             Recommendations:  1. Hx Iron deficient anemia. Melena. Hyponatremia. Hold Eliquis. Hgb normalized. Will need to optimize sodium of 113 and follow sepsis work up. Depending on levels. EGD TBD.  2. Chronic gastritis.Abdominal pain. Continue PPI IV BID  3. Ok to resume diet as appropriate.   Please let us know if melena has worsened, any overt bleeding noted or converted to bright red blood.  .    Thank you so much for allowing me to participate in the care of Audrey Natarajan. Will continue to follow. Please contact us if you have any additional questions.    Raine Torres NP  Gastroenterology  [facility] - Med Surg

## 2022-05-05 NOTE — PLAN OF CARE
Problem: Skin Injury Risk Increased  Goal: Skin Health and Integrity  Outcome: Ongoing, Progressing     Problem: Fall Injury Risk  Goal: Absence of Fall and Fall-Related Injury  Outcome: Ongoing, Progressing     Problem: Diabetes Comorbidity  Goal: Blood Glucose Level Within Targeted Range  Outcome: Ongoing, Progressing     Problem: Fluid and Electrolyte Imbalance (Acute Kidney Injury/Impairment)  Goal: Fluid and Electrolyte Balance  Outcome: Ongoing, Progressing

## 2022-05-05 NOTE — SUBJECTIVE & OBJECTIVE
Interval History:  Patient is crying and very tearful today.  She states that her feet and her back hurts.  She was just given pain medication a few minutes prior.  She also tells me that she is hungry    Review of Systems   Constitutional:  Positive for fever. Negative for activity change and chills.   HENT:  Negative for congestion, rhinorrhea, sinus pressure and trouble swallowing.    Eyes:  Negative for visual disturbance.   Respiratory:  Negative for cough and shortness of breath.    Cardiovascular:  Positive for leg swelling. Negative for chest pain.   Gastrointestinal:  Negative for abdominal pain, constipation, diarrhea, nausea and vomiting.        Black stool   Endocrine: Negative for polyuria.   Genitourinary:  Negative for dysuria, frequency, hematuria and vaginal pain.   Musculoskeletal:  Positive for arthralgias and myalgias.   Skin:  Positive for rash and wound.   Neurological:  Negative for dizziness, seizures, weakness and headaches.   Psychiatric/Behavioral:  Positive for behavioral problems. The patient is nervous/anxious.    Objective:     Vital Signs (Most Recent):  Temp: 99.5 °F (37.5 °C) (05/05/22 1158)  Pulse: 71 (05/05/22 1158)  Resp: 18 (05/05/22 1158)  BP: (!) 137/59 (05/05/22 1158)  SpO2: 97 % (05/05/22 1158)   Vital Signs (24h Range):  Temp:  [97.7 °F (36.5 °C)-99.5 °F (37.5 °C)] 99.5 °F (37.5 °C)  Pulse:  [71-88] 71  Resp:  [18-20] 18  SpO2:  [93 %-98 %] 97 %  BP: (101-164)/(57-74) 137/59     Weight: 118.5 kg (261 lb 3.9 oz)  Body mass index is 42.17 kg/m².    Intake/Output Summary (Last 24 hours) at 5/5/2022 1242  Last data filed at 5/4/2022 2235  Gross per 24 hour   Intake 800.42 ml   Output 750 ml   Net 50.42 ml      Physical Exam  Vitals and nursing note reviewed.   Constitutional:       Appearance: Normal appearance. She is ill-appearing.   HENT:      Head: Normocephalic and atraumatic.      Mouth/Throat:      Mouth: Mucous membranes are dry.      Pharynx: Oropharynx is clear.    Eyes:      General: No scleral icterus.     Conjunctiva/sclera: Conjunctivae normal.   Cardiovascular:      Rate and Rhythm: Normal rate and regular rhythm.      Pulses: Normal pulses.      Heart sounds: Normal heart sounds.   Pulmonary:      Effort: Pulmonary effort is normal. No respiratory distress.      Breath sounds: Normal breath sounds. No wheezing or rales.   Abdominal:      General: Bowel sounds are normal. There is no distension.      Palpations: Abdomen is soft.      Tenderness: There is no abdominal tenderness.   Musculoskeletal:         General: Swelling present.      Comments: Has swelling in bilateral lower extremities but non-pitting   Skin:     Comments: See pictures   Neurological:      Mental Status: She is alert.   Psychiatric:         Attention and Perception: Attention normal.         Mood and Affect: Mood is depressed. Affect is tearful.         Speech: Speech normal.         Behavior: Behavior is not agitated. Behavior is cooperative.       Significant Labs: All pertinent labs within the past 24 hours have been reviewed.    Significant Imaging: I have reviewed all pertinent imaging results/findings within the past 24 hours.

## 2022-05-05 NOTE — ASSESSMENT & PLAN NOTE
Presents with a sodium of 118 which is new.  She is currently asymptomatic.  Hypotonic hyponatremia - holding carbamazepine thought appears she was not taking this  - check urine sodium, serum osmalality  - Urine Na <20, started on normal saline with no improvement  - fluid restriction, consult Nephrology for help with management  - monitor BMP q6h

## 2022-05-05 NOTE — ASSESSMENT & PLAN NOTE
Presents with hemoglobin of 7.3 with her hemoglobin being 9.7 just last week.  She does endorse some dark colored stool most notably 3 days ago.  She is on anticoagulation for blood clots.  She does have history of chronic gastritis as well.  Start on PPI IV and hold anticoagulation.  GI consulted for further evaluation  - Planning for EGD - now HB 15?? Recheck, likely spurious

## 2022-05-05 NOTE — ASSESSMENT & PLAN NOTE
Long history of bipolar 1 disorder with recent psychosis at last hospital stay.  Will resume regimen Risperidone, Depakote.  Holding carbamazepine at the moment due to hyponatremia  - sister Crystal states patient has not been compliant with her medications and feels that she still has some paranoia that her stepdaughter had been hiding her medications though she believes it was the patient herself.  - may need Psych consult prior to d/c, continue monitoring

## 2022-05-06 PROBLEM — D50.9 IRON DEFICIENCY ANEMIA: Status: ACTIVE | Noted: 2022-05-06

## 2022-05-06 PROBLEM — R19.7 VOMITING AND DIARRHEA: Status: RESOLVED | Noted: 2018-02-26 | Resolved: 2022-05-06

## 2022-05-06 PROBLEM — R10.11 RIGHT UPPER QUADRANT ABDOMINAL PAIN: Status: RESOLVED | Noted: 2020-09-28 | Resolved: 2022-05-06

## 2022-05-06 PROBLEM — I82.503 CHRONIC DEEP VEIN THROMBOSIS (DVT) OF BOTH LOWER EXTREMITIES: Status: ACTIVE | Noted: 2022-04-13

## 2022-05-06 PROBLEM — I73.9 PAD (PERIPHERAL ARTERY DISEASE): Status: ACTIVE | Noted: 2022-05-06

## 2022-05-06 PROBLEM — R11.10 VOMITING AND DIARRHEA: Status: RESOLVED | Noted: 2018-02-26 | Resolved: 2022-05-06

## 2022-05-06 PROBLEM — N17.9 AKI (ACUTE KIDNEY INJURY): Status: RESOLVED | Noted: 2022-04-20 | Resolved: 2022-05-06

## 2022-05-06 PROBLEM — L03.116 CELLULITIS OF BOTH FEET: Status: ACTIVE | Noted: 2022-05-06

## 2022-05-06 PROBLEM — E87.5 HYPERKALEMIA: Status: RESOLVED | Noted: 2022-04-21 | Resolved: 2022-05-06

## 2022-05-06 PROBLEM — L03.119 CELLULITIS OF LOWER EXTREMITY: Status: RESOLVED | Noted: 2021-02-10 | Resolved: 2022-05-06

## 2022-05-06 PROBLEM — R10.11 RUQ PAIN: Status: RESOLVED | Noted: 2020-09-28 | Resolved: 2022-05-06

## 2022-05-06 PROBLEM — D64.9 ANEMIA: Status: RESOLVED | Noted: 2022-04-13 | Resolved: 2022-05-06

## 2022-05-06 PROBLEM — L03.115 CELLULITIS OF RIGHT FOOT: Status: ACTIVE | Noted: 2022-05-06

## 2022-05-06 PROBLEM — D72.829 LEUCOCYTOSIS: Status: RESOLVED | Noted: 2020-09-29 | Resolved: 2022-05-06

## 2022-05-06 PROBLEM — D68.59 HYPERCOAGULABLE STATE: Status: RESOLVED | Noted: 2019-09-25 | Resolved: 2022-05-06

## 2022-05-06 LAB
ANION GAP SERPL CALC-SCNC: 4 MMOL/L (ref 8–16)
ANION GAP SERPL CALC-SCNC: 5 MMOL/L (ref 8–16)
ANION GAP SERPL CALC-SCNC: 6 MMOL/L (ref 8–16)
ASCENDING AORTA: 2.7 CM
AV INDEX (PROSTH): 0.96
AV MEAN GRADIENT: 5 MMHG
AV PEAK GRADIENT: 9 MMHG
AV VALVE AREA: 3.38 CM2
AV VELOCITY RATIO: 0.8
BACTERIA SPEC AEROBE CULT: ABNORMAL
BASOPHILS # BLD AUTO: 0.03 K/UL (ref 0–0.2)
BASOPHILS NFR BLD: 0.2 % (ref 0–1.9)
BLD PROD TYP BPU: NORMAL
BLOOD UNIT EXPIRATION DATE: NORMAL
BLOOD UNIT TYPE CODE: 8400
BLOOD UNIT TYPE: NORMAL
BSA FOR ECHO PROCEDURE: 2.23 M2
BUN SERPL-MCNC: 11 MG/DL (ref 6–20)
BUN SERPL-MCNC: 13 MG/DL (ref 6–20)
BUN SERPL-MCNC: 9 MG/DL (ref 6–20)
CALCIUM SERPL-MCNC: 7.6 MG/DL (ref 8.7–10.5)
CALCIUM SERPL-MCNC: 7.7 MG/DL (ref 8.7–10.5)
CALCIUM SERPL-MCNC: 8.4 MG/DL (ref 8.7–10.5)
CHLORIDE SERPL-SCNC: 88 MMOL/L (ref 95–110)
CHLORIDE SERPL-SCNC: 89 MMOL/L (ref 95–110)
CHLORIDE SERPL-SCNC: 90 MMOL/L (ref 95–110)
CO2 SERPL-SCNC: 25 MMOL/L (ref 23–29)
CO2 SERPL-SCNC: 25 MMOL/L (ref 23–29)
CO2 SERPL-SCNC: 26 MMOL/L (ref 23–29)
CODING SYSTEM: NORMAL
CORTIS SERPL-MCNC: 12 UG/DL
CREAT SERPL-MCNC: 0.6 MG/DL (ref 0.5–1.4)
CREAT SERPL-MCNC: 0.6 MG/DL (ref 0.5–1.4)
CREAT SERPL-MCNC: 0.7 MG/DL (ref 0.5–1.4)
CV ECHO LV RWT: 0.42 CM
DIFFERENTIAL METHOD: ABNORMAL
DISPENSE STATUS: NORMAL
DOP CALC AO PEAK VEL: 1.51 M/S
DOP CALC AO VTI: 28.53 CM
DOP CALC LVOT AREA: 3.5 CM2
DOP CALC LVOT DIAMETER: 2.12 CM
DOP CALC LVOT PEAK VEL: 1.21 M/S
DOP CALC LVOT STROKE VOLUME: 96.56 CM3
DOP CALCLVOT PEAK VEL VTI: 27.37 CM
E WAVE DECELERATION TIME: 236.21 MSEC
E/A RATIO: 1.43
E/E' RATIO: 12.6 M/S
ECHO LV POSTERIOR WALL: 1.07 CM (ref 0.6–1.1)
EJECTION FRACTION: 65 %
EOSINOPHIL # BLD AUTO: 0.1 K/UL (ref 0–0.5)
EOSINOPHIL NFR BLD: 0.9 % (ref 0–8)
ERYTHROCYTE [DISTWIDTH] IN BLOOD BY AUTOMATED COUNT: 16.7 % (ref 11.5–14.5)
EST. GFR  (AFRICAN AMERICAN): >60 ML/MIN/1.73 M^2
EST. GFR  (NON AFRICAN AMERICAN): >60 ML/MIN/1.73 M^2
FRACTIONAL SHORTENING: 28 % (ref 28–44)
GLUCOSE SERPL-MCNC: 110 MG/DL (ref 70–110)
GLUCOSE SERPL-MCNC: 115 MG/DL (ref 70–110)
GLUCOSE SERPL-MCNC: 138 MG/DL (ref 70–110)
GRAM STN SPEC: NORMAL
HCT VFR BLD AUTO: 22 % (ref 37–48.5)
HGB BLD-MCNC: 7.3 G/DL (ref 12–16)
IMM GRANULOCYTES # BLD AUTO: 0.28 K/UL (ref 0–0.04)
IMM GRANULOCYTES NFR BLD AUTO: 2 % (ref 0–0.5)
INTERVENTRICULAR SEPTUM: 1.07 CM (ref 0.6–1.1)
IVRT: 125.59 MSEC
LA MAJOR: 5.46 CM
LA MINOR: 6.39 CM
LA WIDTH: 4.74 CM
LEFT ATRIUM SIZE: 4.27 CM
LEFT ATRIUM VOLUME INDEX: 45.2 ML/M2
LEFT ATRIUM VOLUME: 101.3 CM3
LEFT INTERNAL DIMENSION IN SYSTOLE: 3.65 CM (ref 2.1–4)
LEFT VENTRICLE DIASTOLIC VOLUME INDEX: 54.02 ML/M2
LEFT VENTRICLE DIASTOLIC VOLUME: 121.01 ML
LEFT VENTRICLE MASS INDEX: 90 G/M2
LEFT VENTRICLE SYSTOLIC VOLUME INDEX: 25.1 ML/M2
LEFT VENTRICLE SYSTOLIC VOLUME: 56.18 ML
LEFT VENTRICULAR INTERNAL DIMENSION IN DIASTOLE: 5.05 CM (ref 3.5–6)
LEFT VENTRICULAR MASS: 202.7 G
LV LATERAL E/E' RATIO: 12.6 M/S
LV SEPTAL E/E' RATIO: 12.6 M/S
LYMPHOCYTES # BLD AUTO: 1.4 K/UL (ref 1–4.8)
LYMPHOCYTES NFR BLD: 10.1 % (ref 18–48)
MAGNESIUM SERPL-MCNC: 1.8 MG/DL (ref 1.6–2.6)
MCH RBC QN AUTO: 27.5 PG (ref 27–31)
MCHC RBC AUTO-ENTMCNC: 33.2 G/DL (ref 32–36)
MCV RBC AUTO: 83 FL (ref 82–98)
MONOCYTES # BLD AUTO: 1.4 K/UL (ref 0.3–1)
MONOCYTES NFR BLD: 10.1 % (ref 4–15)
MV PEAK A VEL: 0.88 M/S
MV PEAK E VEL: 1.26 M/S
MV STENOSIS PRESSURE HALF TIME: 68.5 MS
MV VALVE AREA P 1/2 METHOD: 3.21 CM2
NEUTROPHILS # BLD AUTO: 10.7 K/UL (ref 1.8–7.7)
NEUTROPHILS NFR BLD: 76.7 % (ref 38–73)
NRBC BLD-RTO: 0 /100 WBC
OSMOLALITY UR: 159 MOSM/KG (ref 50–1200)
PHOSPHATE SERPL-MCNC: 3 MG/DL (ref 2.7–4.5)
PISA TR MAX VEL: 2.14 M/S
PLATELET # BLD AUTO: 601 K/UL (ref 150–450)
PMV BLD AUTO: 9 FL (ref 9.2–12.9)
POCT GLUCOSE: 109 MG/DL (ref 70–110)
POCT GLUCOSE: 127 MG/DL (ref 70–110)
POCT GLUCOSE: 137 MG/DL (ref 70–110)
POTASSIUM SERPL-SCNC: 5 MMOL/L (ref 3.5–5.1)
POTASSIUM SERPL-SCNC: 5.1 MMOL/L (ref 3.5–5.1)
POTASSIUM SERPL-SCNC: 5.7 MMOL/L (ref 3.5–5.1)
PULM VEIN S/D RATIO: 1.32
PV PEAK D VEL: 0.68 M/S
PV PEAK S VEL: 0.9 M/S
PV PEAK VELOCITY: 1.44 CM/S
RA MAJOR: 5.05 CM
RA PRESSURE: 8 MMHG
RA WIDTH: 3.54 CM
RBC # BLD AUTO: 2.65 M/UL (ref 4–5.4)
RIGHT VENTRICULAR END-DIASTOLIC DIMENSION: 4.27 CM
RV TISSUE DOPPLER FREE WALL SYSTOLIC VELOCITY 1 (APICAL 4 CHAMBER VIEW): 17.78 CM/S
SODIUM SERPL-SCNC: 118 MMOL/L (ref 136–145)
SODIUM SERPL-SCNC: 119 MMOL/L (ref 136–145)
SODIUM SERPL-SCNC: 121 MMOL/L (ref 136–145)
STJ: 2.52 CM
T4 SERPL-MCNC: 6.4 UG/DL (ref 4.5–11.5)
TDI LATERAL: 0.1 M/S
TDI SEPTAL: 0.1 M/S
TDI: 0.1 M/S
TR MAX PG: 18 MMHG
TRANS ERYTHROCYTES VOL PATIENT: NORMAL ML
TRICUSPID ANNULAR PLANE SYSTOLIC EXCURSION: 3.05 CM
TSH SERPL DL<=0.005 MIU/L-ACNC: 2.85 UIU/ML (ref 0.4–4)
TV REST PULMONARY ARTERY PRESSURE: 26 MMHG
VANCOMYCIN TROUGH SERPL-MCNC: 16.4 UG/ML (ref 10–22)
WBC # BLD AUTO: 14 K/UL (ref 3.9–12.7)

## 2022-05-06 PROCEDURE — 87040 BLOOD CULTURE FOR BACTERIA: CPT | Mod: 59 | Performed by: HOSPITALIST

## 2022-05-06 PROCEDURE — 27000207 HC ISOLATION

## 2022-05-06 PROCEDURE — 87116 MYCOBACTERIA CULTURE: CPT | Performed by: PODIATRIST

## 2022-05-06 PROCEDURE — 83935 ASSAY OF URINE OSMOLALITY: CPT | Performed by: INTERNAL MEDICINE

## 2022-05-06 PROCEDURE — 88307 TISSUE EXAM BY PATHOLOGIST: CPT | Performed by: PATHOLOGY

## 2022-05-06 PROCEDURE — 25000003 PHARM REV CODE 250: Performed by: INTERNAL MEDICINE

## 2022-05-06 PROCEDURE — 25000003 PHARM REV CODE 250: Performed by: EMERGENCY MEDICINE

## 2022-05-06 PROCEDURE — 94760 N-INVAS EAR/PLS OXIMETRY 1: CPT

## 2022-05-06 PROCEDURE — 25000003 PHARM REV CODE 250: Performed by: REGISTERED NURSE

## 2022-05-06 PROCEDURE — 36415 COLL VENOUS BLD VENIPUNCTURE: CPT | Performed by: STUDENT IN AN ORGANIZED HEALTH CARE EDUCATION/TRAINING PROGRAM

## 2022-05-06 PROCEDURE — 11042 PR DEBRIDEMENT, SKIN, SUB-Q TISSUE,=<20 SQ CM: ICD-10-PCS | Mod: 59,,, | Performed by: PODIATRIST

## 2022-05-06 PROCEDURE — 94640 AIRWAY INHALATION TREATMENT: CPT

## 2022-05-06 PROCEDURE — 87102 FUNGUS ISOLATION CULTURE: CPT | Performed by: PODIATRIST

## 2022-05-06 PROCEDURE — 88307 PR  SURG PATH,LEVEL V: ICD-10-PCS | Mod: 26,,, | Performed by: PATHOLOGY

## 2022-05-06 PROCEDURE — 90792 PSYCH DIAG EVAL W/MED SRVCS: CPT | Mod: SA,HB,, | Performed by: PHYSICIAN ASSISTANT

## 2022-05-06 PROCEDURE — 25000003 PHARM REV CODE 250: Performed by: STUDENT IN AN ORGANIZED HEALTH CARE EDUCATION/TRAINING PROGRAM

## 2022-05-06 PROCEDURE — 99223 PR INITIAL HOSPITAL CARE,LEVL III: ICD-10-PCS | Mod: ,,, | Performed by: INTERNAL MEDICINE

## 2022-05-06 PROCEDURE — 82533 TOTAL CORTISOL: CPT | Performed by: INTERNAL MEDICINE

## 2022-05-06 PROCEDURE — C9113 INJ PANTOPRAZOLE SODIUM, VIA: HCPCS | Performed by: STUDENT IN AN ORGANIZED HEALTH CARE EDUCATION/TRAINING PROGRAM

## 2022-05-06 PROCEDURE — 84100 ASSAY OF PHOSPHORUS: CPT | Performed by: STUDENT IN AN ORGANIZED HEALTH CARE EDUCATION/TRAINING PROGRAM

## 2022-05-06 PROCEDURE — 80048 BASIC METABOLIC PNL TOTAL CA: CPT | Mod: 91 | Performed by: STUDENT IN AN ORGANIZED HEALTH CARE EDUCATION/TRAINING PROGRAM

## 2022-05-06 PROCEDURE — 85025 COMPLETE CBC W/AUTO DIFF WBC: CPT | Performed by: STUDENT IN AN ORGANIZED HEALTH CARE EDUCATION/TRAINING PROGRAM

## 2022-05-06 PROCEDURE — 84443 ASSAY THYROID STIM HORMONE: CPT | Performed by: INTERNAL MEDICINE

## 2022-05-06 PROCEDURE — 36430 TRANSFUSION BLD/BLD COMPNT: CPT

## 2022-05-06 PROCEDURE — 84436 ASSAY OF TOTAL THYROXINE: CPT | Performed by: INTERNAL MEDICINE

## 2022-05-06 PROCEDURE — 20220 PR BONE BIOPSY,TROCAR/NEEDLE SUPERF: ICD-10-PCS | Mod: ,,, | Performed by: PODIATRIST

## 2022-05-06 PROCEDURE — 88311 DECALCIFY TISSUE: CPT | Performed by: PATHOLOGY

## 2022-05-06 PROCEDURE — 88311 PR  DECALCIFY TISSUE: ICD-10-PCS | Mod: 26,,, | Performed by: PATHOLOGY

## 2022-05-06 PROCEDURE — 80202 ASSAY OF VANCOMYCIN: CPT | Performed by: HOSPITALIST

## 2022-05-06 PROCEDURE — 21400001 HC TELEMETRY ROOM

## 2022-05-06 PROCEDURE — 99223 1ST HOSP IP/OBS HIGH 75: CPT | Mod: ,,, | Performed by: INTERNAL MEDICINE

## 2022-05-06 PROCEDURE — S0030 INJECTION, METRONIDAZOLE: HCPCS | Performed by: STUDENT IN AN ORGANIZED HEALTH CARE EDUCATION/TRAINING PROGRAM

## 2022-05-06 PROCEDURE — 87205 SMEAR GRAM STAIN: CPT | Performed by: PODIATRIST

## 2022-05-06 PROCEDURE — 88307 TISSUE EXAM BY PATHOLOGIST: CPT | Mod: 26,,, | Performed by: PATHOLOGY

## 2022-05-06 PROCEDURE — 63600175 PHARM REV CODE 636 W HCPCS: Performed by: STUDENT IN AN ORGANIZED HEALTH CARE EDUCATION/TRAINING PROGRAM

## 2022-05-06 PROCEDURE — 88311 DECALCIFY TISSUE: CPT | Mod: 26,,, | Performed by: PATHOLOGY

## 2022-05-06 PROCEDURE — 90792 PR PSYCHIATRIC DIAGNOSTIC EVALUATION W/MEDICAL SERVICES: ICD-10-PCS | Mod: SA,HB,, | Performed by: PHYSICIAN ASSISTANT

## 2022-05-06 PROCEDURE — P9021 RED BLOOD CELLS UNIT: HCPCS | Performed by: STUDENT IN AN ORGANIZED HEALTH CARE EDUCATION/TRAINING PROGRAM

## 2022-05-06 PROCEDURE — 11042 DBRDMT SUBQ TIS 1ST 20SQCM/<: CPT | Mod: 59,,, | Performed by: PODIATRIST

## 2022-05-06 PROCEDURE — 20220 BONE BIOPSY TROCAR/NDL SUPFC: CPT | Mod: ,,, | Performed by: PODIATRIST

## 2022-05-06 PROCEDURE — 87206 SMEAR FLUORESCENT/ACID STAI: CPT | Performed by: PODIATRIST

## 2022-05-06 PROCEDURE — 25000242 PHARM REV CODE 250 ALT 637 W/ HCPCS: Performed by: STUDENT IN AN ORGANIZED HEALTH CARE EDUCATION/TRAINING PROGRAM

## 2022-05-06 PROCEDURE — 83735 ASSAY OF MAGNESIUM: CPT | Performed by: STUDENT IN AN ORGANIZED HEALTH CARE EDUCATION/TRAINING PROGRAM

## 2022-05-06 PROCEDURE — 87070 CULTURE OTHR SPECIMN AEROBIC: CPT | Performed by: PODIATRIST

## 2022-05-06 PROCEDURE — 63600175 PHARM REV CODE 636 W HCPCS: Performed by: EMERGENCY MEDICINE

## 2022-05-06 RX ADMIN — METOPROLOL TARTRATE 50 MG: 50 TABLET, FILM COATED ORAL at 08:05

## 2022-05-06 RX ADMIN — OXYCODONE AND ACETAMINOPHEN 1 TABLET: 7.5; 325 TABLET ORAL at 04:05

## 2022-05-06 RX ADMIN — SODIUM CHLORIDE: 0.9 INJECTION, SOLUTION INTRAVENOUS at 12:05

## 2022-05-06 RX ADMIN — METRONIDAZOLE 500 MG: 500 INJECTION, SOLUTION INTRAVENOUS at 09:05

## 2022-05-06 RX ADMIN — ACETAMINOPHEN 1000 MG: 500 TABLET ORAL at 11:05

## 2022-05-06 RX ADMIN — DOCUSATE SODIUM 100 MG: 100 CAPSULE, LIQUID FILLED ORAL at 08:05

## 2022-05-06 RX ADMIN — OXYCODONE AND ACETAMINOPHEN 1 TABLET: 7.5; 325 TABLET ORAL at 06:05

## 2022-05-06 RX ADMIN — ASPIRIN 81 MG CHEWABLE TABLET 81 MG: 81 TABLET CHEWABLE at 08:05

## 2022-05-06 RX ADMIN — DIVALPROEX SODIUM 500 MG: 250 TABLET, DELAYED RELEASE ORAL at 08:05

## 2022-05-06 RX ADMIN — MUPIROCIN: 20 OINTMENT TOPICAL at 08:05

## 2022-05-06 RX ADMIN — RISPERIDONE 3 MG: 1 TABLET ORAL at 08:05

## 2022-05-06 RX ADMIN — PANTOPRAZOLE SODIUM 40 MG: 40 INJECTION, POWDER, FOR SOLUTION INTRAVENOUS at 08:05

## 2022-05-06 RX ADMIN — HYDROXYZINE PAMOATE 50 MG: 25 CAPSULE ORAL at 08:05

## 2022-05-06 RX ADMIN — METRONIDAZOLE 500 MG: 500 INJECTION, SOLUTION INTRAVENOUS at 02:05

## 2022-05-06 RX ADMIN — VANCOMYCIN HYDROCHLORIDE 1750 MG: 10 INJECTION, POWDER, LYOPHILIZED, FOR SOLUTION INTRAVENOUS at 10:05

## 2022-05-06 RX ADMIN — RISPERIDONE 2 MG: 1 TABLET ORAL at 08:05

## 2022-05-06 RX ADMIN — SODIUM ZIRCONIUM CYCLOSILICATE 10 G: 10 POWDER, FOR SUSPENSION ORAL at 08:05

## 2022-05-06 RX ADMIN — PRAVASTATIN SODIUM 40 MG: 40 TABLET ORAL at 08:05

## 2022-05-06 RX ADMIN — HYDROXYZINE PAMOATE 50 MG: 25 CAPSULE ORAL at 03:05

## 2022-05-06 RX ADMIN — OXYCODONE AND ACETAMINOPHEN 1 TABLET: 7.5; 325 TABLET ORAL at 09:05

## 2022-05-06 RX ADMIN — METRONIDAZOLE 500 MG: 500 INJECTION, SOLUTION INTRAVENOUS at 05:05

## 2022-05-06 RX ADMIN — FLUTICASONE FUROATE AND VILANTEROL TRIFENATATE 1 PUFF: 100; 25 POWDER RESPIRATORY (INHALATION) at 08:05

## 2022-05-06 RX ADMIN — CIPROFLOXACIN 400 MG: 2 INJECTION, SOLUTION INTRAVENOUS at 02:05

## 2022-05-06 RX ADMIN — OLANZAPINE 10 MG: 10 TABLET, FILM COATED ORAL at 08:05

## 2022-05-06 RX ADMIN — DIVALPROEX SODIUM 1250 MG: 250 TABLET, DELAYED RELEASE ORAL at 08:05

## 2022-05-06 RX ADMIN — CIPROFLOXACIN 400 MG: 2 INJECTION, SOLUTION INTRAVENOUS at 01:05

## 2022-05-06 RX ADMIN — OXYCODONE AND ACETAMINOPHEN 1 TABLET: 7.5; 325 TABLET ORAL at 10:05

## 2022-05-06 NOTE — ASSESSMENT & PLAN NOTE
For chronic DVT, last visualized 4/2022  Holding anticoagulation currently with anemia requiring transfusion

## 2022-05-06 NOTE — HPI
52 y/o with psychiatric illness - previously under [facility], DM, DVT/PE history with IVC filter in place, s/p splenectomy; admitted with bilateral wounds to feet - left worse than right (left foot with charcot deformity) and recurrent DVTs both legs. The left foot culture collected 4/13 positive for MRSA and finegoldia magna. MRI without definitive evidence of osteo. Continued vanco and flagyl for 14 days total - if discharged would switch vanco to po doxy. Discharged from [place] on 4/26.  Presented to podiatry clinic for evaluation and care of bilateral foot wounds.  Sent to ED and admitted with a worsening foot infection. She was started on vancomycin, Cipro, and Flagyl (anaphylaxis with PCN, by report). ID is consulted for diabetic foot infection and bacteremia. The patient is feeling better. Malodor to foot. No fever or chills.

## 2022-05-06 NOTE — PROGRESS NOTES
Pharmacokinetic Assessment Follow Up: IV Vancomycin    Vancomycin serum concentration assessment(s):    The trough level was drawn incorrectly and cannot be used to guide therapy at this time.  Dose scheduled for 10:30 on 5/5/22 was not administered    Vancomycin Regimen Plan:    Continue regimen to Vancomycin 1750 mg IV every 12 hours with next serum trough concentration measured at 09:30 prior to next (fourth) dose on 5/6/22    Drug levels (last 3 results):  Recent Labs   Lab Result Units 05/05/22  2138   Vancomycin-Trough ug/mL 8.4*       Pharmacy will continue to follow and monitor vancomycin.    Please contact pharmacy at extension [phone] for questions regarding this assessment.    Thank you for the consult,   Maria E Lozano       Patient brief summary:  Audrey Natarajan is a 51 y.o. female initiated on antimicrobial therapy with IV Vancomycin for treatment of skin & soft tissue infection    The patient's current regimen is vancomycin 1750 mg IV q12h    Drug Allergies:   Review of patient's allergies indicates:   Allergen Reactions    Morphine Other (See Comments)     Patient had a psychotic episode after taking Morphine  Agitation, hallucinations    Penicillins Anaphylaxis     itching    Januvia [sitagliptin] Hives       Actual Body Weight:   118.5 kg    Renal Function:   Estimated Creatinine Clearance: 127.4 mL/min (based on SCr of 0.7 mg/dL).,     Dialysis Method (if applicable):  N/A    CBC (last 72 hours):  Recent Labs   Lab Result Units 05/04/22  0944 05/05/22  0555 05/05/22  1510   WBC K/uL 20.54* 19.24*  --    Hemoglobin g/dL 7.3* 15.5 6.8*   Hematocrit % 21.3* 46.3  --    Platelets K/uL 628* SEE COMMENT  --    Gran % % 74.1* 72.4  --    Lymph % % 9.6* 12.1*  --    Mono % % 15.1* 11.2  --    Eosinophil % % 0.0 1.8  --    Basophil % % 0.2 0.5  --    Differential Method  Automated Automated  --        Metabolic Panel (last 72 hours):  Recent Labs   Lab Result Units 05/04/22  0944 05/04/22  0034  05/04/22 2028 05/04/22  2334 05/05/22  0555 05/05/22  0830 05/05/22  1511 05/05/22  2138   Sodium mmol/L 118* 116*  --  118* 113* 116* 116* 117*   Sodium, Urine mmol/L  --   --  <20*  --   --   --   --   --    Potassium mmol/L 5.0 5.1  --  5.7* 5.7* 5.4* 5.2* 5.2*   Chloride mmol/L 85* 86*  --  86* 85* 85* 86* 87*   CO2 mmol/L 25 24  --  22* 21* 24 22* 25   Glucose mg/dL 125* 147*  --  114* 90 92 136* 150*   Glucose, UA   --   --  Negative  --   --   --   --   --    BUN mg/dL 12 12  --  13 13 13 14 14   Creatinine mg/dL 0.6 0.7  --  0.7 0.7 0.7 0.7 0.7   Albumin g/dL 2.1*  --   --   --   --   --   --   --    Total Bilirubin mg/dL 0.3  --   --   --   --   --   --   --    Alkaline Phosphatase U/L 136*  --   --   --   --   --   --   --    AST U/L 18  --   --   --   --   --   --   --    ALT U/L 14  --   --   --   --   --   --   --    Magnesium mg/dL  --   --   --   --  1.5*  --   --   --    Phosphorus mg/dL  --   --   --   --  3.3  --   --   --        Vancomycin Administrations:  vancomycin given in the last 96 hours                     vancomycin (VANCOCIN) 1,750 mg in dextrose 5 % 500 mL IVPB (mg) 1,750 mg New Bag 05/05/22 2235     1,750 mg New Bag 05/04/22 2235    vancomycin (VANCOCIN) 2,000 mg in dextrose 5 % 500 mL IVPB (mg) 2,000 mg New Bag 05/04/22 1026                    Microbiologic Results:  Microbiology Results (last 7 days)       Procedure Component Value Units Date/Time    Aerobic culture [[id number]]  (Abnormal) Collected: 05/04/22 1500    Order Status: Completed Specimen: Wound from Foot, Left Updated: 05/05/22 1232     Aerobic Bacterial Culture STAPHYLOCOCCUS AUREUS  Many  Susceptibility pending      Aerobic culture [[id number]]  (Abnormal) Collected: 05/04/22 1500    Order Status: Completed Specimen: Wound from Foot, Right Updated: 05/05/22 1227     Aerobic Bacterial Culture GRAM NEGATIVE PEEWEE  >100,000 cfu/ml  Identification and susceptibility pending        GRAM NEGATIVE PEEWEE  Moderate  Identification  and susceptibility pending        STAPHYLOCOCCUS AUREUS  Many  Susceptibility pending      Blood culture x two cultures. Draw prior to antibiotics. [[id number]] Collected: 05/04/22 0947    Order Status: Completed Specimen: Blood from Peripheral, Antecubital, Left Updated: 05/05/22 1103     Blood Culture, Routine No Growth to date      No Growth to date    Narrative:      Aerobic and anaerobic    Blood culture x two cultures. Draw prior to antibiotics. [[id number]] Collected: 05/04/22 0944    Order Status: Completed Specimen: Blood from Peripheral, Antecubital, Right Updated: 05/05/22 0959     Blood Culture, Routine Gram stain abbe bottle: Gram positive cocci in clusters resembling Staph       Results called to and read back by: Linnette KRAMER 05/05/2022        09:58    Narrative:      Aerobic and anaerobic           3

## 2022-05-06 NOTE — SUBJECTIVE & OBJECTIVE
Patient History               Medical as of 5/6/2022       Past Medical History       Diagnosis Date Comments Source    ADHD (attention deficit hyperactivity disorder) -- -- Provider    Arthritis -- -- Provider    Asthma -- -- Provider    Bipolar 1 disorder -- -- Provider    Cataract -- -- Provider    Cigarette smoker -- -- Provider    COPD (chronic obstructive pulmonary disease) -- -- Provider    Coronary artery disease -- A fib Provider    Depression -- bipolar manic depresson Provider    Diabetes mellitus -- -- Provider    Diabetic foot ulcers -- -- Provider    Diabetic neuropathy -- -- Provider    DVT of lower extremity, bilateral 07/2013 bilateral LE DVT. [place] filter placed.  Provider    Encounter for blood transfusion -- -- Provider    History of blood clots 1. Left Leg=2003; 2.Bilateral Groin=Blood Clots= 5 or 6/ 2013 & 7/2013; 3. LLL of Lung=7/2013;  4. Lt. Lower Leg=7/2013.  Pt. had 1st Blood Clot after [id number], & Last=2013. Estelita Filter= Rt.Lateral Neck. Provider    HTN (hypertension) 06/06/2013 Pt states that she does not have hypertension Provider    Hypercholesteremia -- -- Provider    Irregular heartbeat -- -- Provider    Neuromuscular disorder -- neuropathy feet Provider    Obese -- -- Provider    PE (pulmonary embolism) 07/2013 bilat LE DVT.  Provider    Restless leg syndrome -- -- Provider              Pertinent Negatives       Diagnosis Date Noted Comments Source    Amblyopia 06/19/2015 -- Provider    Diabetic retinopathy 06/19/2015 -- Provider    Glaucoma 06/19/2015 -- Provider    Macular degeneration 06/19/2015 -- Provider    Myocardial infarction 11/28/2014 -- Provider    Retinal detachment 06/19/2015 -- Provider    Sickle cell anemia 06/19/2015 -- Provider    Sickle cell trait 06/19/2015 -- Provider    Strabismus 06/19/2015 -- Provider    Uveitis 06/19/2015 -- Provider                          Surgical as of 5/6/2022       Past Surgical History       Procedure  "Laterality Date Comments Source    SPLENECTOMY, TOTAL -- 2003 -- Provider    VEIN SURGERY --  Lt leg Provider    Green' s filter [Other] Right 2012 Right Neck & Tunneled Down. Provider    TONSILLECTOMY -- -- as a child Provider    ABDOMINAL SURGERY --  gastric sleeve Provider    OVARIAN CYST REMOVAL -- 3/13/2014 -- Provider    HERNIA REPAIR -- -- "[place] of Hernias [facility] around th [place].", pt. states Provider    BILATERAL OOPHORECTOMY Bilateral 2015 -- Provider    NH REMOVAL OF OVARY/TUBE(S) -- -- -- Provider    TYMPANOSTOMY TUBE PLACEMENT --  -- Provider    LAPAROSCOPIC CHOLECYSTECTOMY N/A 9/10/2020 Procedure: CHOLECYSTECTOMY, LAPAROSCOPIC;  Surgeon: Montrell Gutierrez MD;  Location: [facility];  Service: General;  Laterality: N/A;  RN PREOP ----COVID Negative   Provider    CHOLECYSTECTOMY -- -- -- Provider                          Family as of 2022       Problem Relation Name Age of Onset Comments Source    Hypertension Father -- -- -- Provider    Diabetes Father -- -- -- Provider    Heart disease Father -- -- -- Provider    Cataracts Father -- -- -- Provider    Diabetes Paternal Grandfather -- -- -- Provider    Heart disease Paternal Grandfather -- -- -- Provider    No Known Problems Mother -- -- -- Provider    Ovarian cancer Maternal Grandmother -- --  from this. ? age  Provider    No Known Problems Sister -- -- -- Provider    No Known Problems Brother -- -- -- Provider    No Known Problems Maternal Aunt -- -- -- Provider    No Known Problems Maternal Uncle -- -- -- Provider    No Known Problems Paternal Aunt -- -- -- Provider    No Known Problems Paternal Uncle -- -- -- Provider    No Known Problems Maternal Grandfather -- -- -- Provider    Ovarian cancer Paternal Grandmother -- -- -- Provider    Uterine cancer Neg Hx -- -- -- Provider    Breast cancer Neg Hx -- -- -- Provider    Colon cancer Neg Hx -- -- -- Provider    Amblyopia Neg Hx -- -- -- Provider    Blindness " Neg Hx -- -- -- Provider    Cancer Neg Hx -- -- -- Provider    Glaucoma Neg Hx -- -- -- Provider    Macular degeneration Neg Hx -- -- -- Provider    Retinal detachment Neg Hx -- -- -- Provider    Strabismus Neg Hx -- -- -- Provider    Stroke Neg Hx -- -- -- Provider    Thyroid disease Neg Hx -- -- -- Provider                  Tobacco Use as of 5/6/2022       Smoking Status Smoking Start Date Smoking Quit Date Packs/Day Years Used    Current Every Day Smoker -- 12/6/2020 1.00 37.00      Types Comments Smokeless Tobacco Status Smokeless Tobacco Quit Date Source     Cigarettes Enrolled in the [employer] on 5/3/14 ([facility] Member ID # [id number]). Ambulatory referral to Smoking Cessation Program Never Used -- Provider                  Alcohol Use as of 5/6/2022       Alcohol Use Drinks/Week Alcohol/Week Comments Source    No 0 Standard drinks or equivalent 0.0 standard drinks -- Provider                  Drug Use as of 5/6/2022       Drug Use Types Frequency Comments Source    No -- -- -- Provider                  Sexual Activity as of 5/6/2022       Sexually Active Birth Control Partners Comments Source    Yes -- Male -- Provider                  Activities of Daily Living as of 5/6/2022    None               Social Documentation as of 5/6/2022     from her  of 20 years  Lives by herself since 2019  Source: Provider               Occupational as of 5/6/2022    None               Socioeconomic as of 5/6/2022       Marital Status Spouse Name Number of Children Years Education Education Level Preferred Language Ethnicity Race Source    Significant Other -- -- -- -- English Not  or /a White Provider                  Pertinent History       Question Response Comments    Lives with -- --    Place in Birth Order -- --    Lives in -- --    Number of Siblings -- --    Raised by -- --    Legal Involvement -- --    Childhood Trauma -- --    Criminal History of -- --    Financial Status -- --     "Highest Level of Education -- --    Does patient have access to a firearm? -- --     Service -- --    Primary Leisure Activity -- --    Spirituality -- --          Past Medical History:   Diagnosis Date    ADHD (attention deficit hyperactivity disorder)     Arthritis     Asthma     Bipolar 1 disorder     Cataract     Cigarette smoker     COPD (chronic obstructive pulmonary disease)     Coronary artery disease     A fib    Depression     bipolar manic depresson    Diabetes mellitus     Diabetic foot ulcers     Diabetic neuropathy     DVT of lower extremity, bilateral 07/2013    bilateral LE DVT. Estelita filter placed.     Encounter for blood transfusion     History of blood clots 1. Left Leg=2003; 2.Bilateral Groin=Blood Clots= 5 or 6/ 2013 & 7/2013; 3. LLL of Lung=7/2013;  4. Lt. Lower Leg=7/2013.     Pt. had 1st Blood Clot after Pyjcjhugkkma=[id number], & Last=2013. Estelita Filter= Rt.Lateral Neck.    HTN (hypertension) 06/06/2013    Pt states that she does not have hypertension    Hypercholesteremia     Irregular heartbeat     Neuromuscular disorder     neuropathy feet    Obese     PE (pulmonary embolism) 07/2013    bilat LE DVT.     Restless leg syndrome      Past Surgical History:   Procedure Laterality Date    ABDOMINAL SURGERY  2010    gastric sleeve    BILATERAL OOPHORECTOMY Bilateral 1/12/2015    CHOLECYSTECTOMY      Green' s filter Right 7/4/2012    Right Neck & Tunneled Down.    HERNIA REPAIR      "[place] of Hernias Repaires around th [place].", pt. states    LAPAROSCOPIC CHOLECYSTECTOMY N/A 9/10/2020    Procedure: CHOLECYSTECTOMY, LAPAROSCOPIC;  Surgeon: Montrell Gutierrez MD;  Location: [facility];  Service: General;  Laterality: N/A;  RN PREOP 9/9----COVID Negative  9/9    OVARIAN CYST REMOVAL  3/13/2014    AK REMOVAL OF OVARY/TUBE(S)      SPLENECTOMY, TOTAL  July 2003    TONSILLECTOMY      as a child    TYMPANOSTOMY TUBE PLACEMENT  1976    VEIN SURGERY  2003    Lt leg     Family History       " Problem Relation (Age of Onset)    Cataracts Father    Diabetes Father, Paternal Grandfather    Heart disease Father, Paternal Grandfather    Hypertension Father    No Known Problems Mother, Sister, Brother, Maternal Aunt, Maternal Uncle, Paternal Aunt, Paternal Uncle, Maternal Grandfather    Ovarian cancer Maternal Grandmother, Paternal Grandmother          Tobacco Use    Smoking status: Current Every Day Smoker     Packs/day: 1.00     Years: 37.00     Pack years: 37.00     Types: Cigarettes     Last attempt to quit: 2020     Years since quittin.4    Smokeless tobacco: Never Used    Tobacco comment: Enrolled in the [employer] on 5/3/14 ([facility] Member ID # [id number]). Ambulatory referral to Smoking Cessation Program   Substance and Sexual Activity    Alcohol use: No     Alcohol/week: 0.0 standard drinks    Drug use: No    Sexual activity: Yes     Partners: Male     Review of patient's allergies indicates:   Allergen Reactions    Morphine Other (See Comments)     Patient had a psychotic episode after taking Morphine  Agitation, hallucinations    Penicillins Anaphylaxis     itching    Januvia [sitagliptin] Hives       No current facility-administered medications on file prior to encounter.     Current Outpatient Medications on File Prior to Encounter   Medication Sig    acetaminophen (TYLENOL) 500 MG tablet Take 2 tablets (1,000 mg total) by mouth every 6 (six) hours as needed for Pain.    albuterol (PROVENTIL/VENTOLIN HFA) 90 mcg/actuation inhaler Inhale 2 puffs into the lungs every 6 (six) hours as needed for Wheezing. Use with spacer  Dispense with 1 spacer    albuterol-ipratropium (DUO-NEB) 2.5 mg-0.5 mg/3 mL nebulizer solution Take 3 mLs by nebulization every 6 (six) hours as needed for Wheezing or Shortness of Breath. Rescue    apixaban (ELIQUIS) 5 mg Tab Take 1 tablet (5 mg total) by mouth 2 (two) times daily.    aspirin 81 MG Chew Take 1 tablet (81 mg total) by mouth once daily.    dicyclomine  (BENTYL) 20 mg tablet Take 1 tablet (20 mg total) by mouth every 6 (six) hours.    divalproex (DEPAKOTE) 250 MG EC tablet Take 5 tablets (1,250 mg total) by mouth every evening.    divalproex (DEPAKOTE) 500 MG TbEC Take 1 tablet (500 mg total) by mouth once daily. PO QAM    DUPIXENT  mg/2 mL PnIj SMARTSI Milligram(s) SUB-Q Every 2 Weeks    EPITOL 200 mg tablet Take 200 mg by mouth 2 (two) times a day.    famotidine (PEPCID) 20 MG tablet Take 20 mg by mouth 2 (two) times daily.    fluticasone propionate (FLONASE) 50 mcg/actuation nasal spray 1 spray (50 mcg total) by Each Nostril route 2 (two) times daily.    furosemide (LASIX) 20 MG tablet TAKE 1 TABLET(20 MG) BY MOUTH EVERY DAY    gabapentin (NEURONTIN) 300 MG capsule TAKE 2 CAPSULES(600 MG) BY MOUTH TWICE DAILY    hydrOXYzine (ATARAX) 50 MG tablet Take 50 mg by mouth 4 (four) times daily as needed.    ibuprofen (ADVIL,MOTRIN) 600 MG tablet TAKE 1 TABLET(600 MG) BY MOUTH EVERY 8 HOURS AS NEEDED FOR PAIN OR BACK PAIN    lisinopriL 10 MG tablet Take 1 tablet (10 mg total) by mouth once daily.    loratadine (CLARITIN) 10 mg tablet Take 1 tablet (10 mg total) by mouth once daily.    magnesium oxide (MAG-OX) 400 mg (241.3 mg magnesium) tablet Take 1 tablet (400 mg total) by mouth 2 (two) times daily.    metFORMIN (GLUCOPHAGE) 1000 MG tablet TAKE 1 TABLET(1000 MG) BY MOUTH TWICE DAILY WITH MEALS    metoprolol tartrate (LOPRESSOR) 50 MG tablet TAKE 1 TABLET(50 MG) BY MOUTH TWICE DAILY    OLANZapine (ZYPREXA) 10 MG tablet Take 1 tablet (10 mg total) by mouth every 8 (eight) hours as needed (Agitation).    OPTICHAMBER BIGG LG MASK Spcr Inhale into the lungs.    pantoprazole (PROTONIX) 40 MG tablet Take 1 tablet (40 mg total) by mouth once daily.    polyvinyl alcohol, artificial tears, (LIQUIFILM TEARS) 1.4 % ophthalmic solution Place 1 drop into both eyes 4 (four) times daily.    pravastatin (PRAVACHOL) 40 MG tablet TAKE 1 TABLET(40 MG) BY MOUTH EVERY EVENING     risperiDONE (RISPERDAL M-TABS) 3 MG disintegrating tablet Take 1 tablet (3 mg total) by mouth 2 (two) times daily.    senna (SENOKOT) 8.6 mg tablet Take 1 tablet by mouth 2 (two) times a day.    blood sugar diagnostic Strp To check BG two  times daily, to use with insurance preferred meter (Patient taking differently: To check BG two  times daily, to use with insurance preferred meter)    blood-glucose meter kit To check BG once daily, to use with insurance preferred meter    blood-glucose meter kit To check BG two times daily, to use with insurance preferred meter    fluticasone-salmeterol diskus inhaler 250-50 mcg Inhale 1 puff into the lungs 2 (two) times daily. Controller    hydrOXYzine pamoate (VISTARIL) 50 MG Cap Take 1 capsule (50 mg total) by mouth 3 (three) times daily.    lancets Misc To check BG two times daily, to use with insurance preferred meter    multivitamin Tab Take 1 tablet by mouth once daily.    TRUE METRIX GLUCOSE METER Misc CHECK BLOOD SUGAR TWICE DAILY    [DISCONTINUED] diclofenac sodium (VOLTAREN) 1 % Gel Apply 2 g topically 4 (four) times daily as needed (Apply to painful area up to 4 times a day as needed for pain). Apply to painful area 4 times a day as needed for pain    [DISCONTINUED] nystatin (NYSTOP) powder APPLY TOPICALLY TO AXILLA AND BREAST FOLDS TWICE DAILY    [DISCONTINUED] QUEtiapine (SEROQUEL) 200 MG Tab Take 1 tablet (200 mg total) by mouth before breakfast.     Psychotherapeutics (From admission, onward)                Start     Stop Route Frequency Ordered    05/04/22 2100  risperiDONE tablet 3 mg         -- Oral Nightly 05/04/22 1259    05/04/22 1400  risperiDONE tablet 2 mg         -- Oral Daily 05/04/22 1259    05/04/22 1325  OLANZapine tablet 10 mg         -- Oral Every 8 hours PRN 05/04/22 1259          Review of Systems  Strengths and Liabilities: Strength: Patient accepts guidance/feedback, Liability: Patient has poor health., Liability: Patient lacks coping  "skills.    Objective:     Vital Signs (Most Recent):  Temp: 97.7 °F (36.5 °C) (05/06/22 1221)  Pulse: 63 (05/06/22 1221)  Resp: 18 (05/06/22 1221)  BP: 133/62 (05/06/22 1221)  SpO2: 97 % (05/06/22 1221) Vital Signs (24h Range):  Temp:  [97.3 °F (36.3 °C)-99.7 °F (37.6 °C)] 97.7 °F (36.5 °C)  Pulse:  [63-84] 63  Resp:  [16-20] 18  SpO2:  [93 %-97 %] 97 %  BP: (109-177)/(51-77) 133/62     Height: 5' 6" (167.6 cm)  Weight: 118.5 kg (261 lb 3.9 oz)  Body mass index is 42.17 kg/m².      Intake/Output Summary (Last 24 hours) at 5/6/2022 1315  Last data filed at 5/6/2022 1000  Gross per 24 hour   Intake 1050 ml   Output 1300 ml   Net -250 ml       Physical Exam  Psychiatric:         Attention and Perception: Attention and perception normal.         Mood and Affect: Mood is anxious and depressed.         Speech: Speech is rapid and pressured and tangential.         Behavior: Behavior is not agitated, slowed, aggressive, withdrawn, hyperactive or combative. Behavior is cooperative.         Thought Content: Thought content is paranoid.         Cognition and Memory: Cognition and memory normal.         Judgment: Judgment is impulsive and inappropriate.      Comments: Questionable delusions vs legitimate pill theft- difficult to tell.        Significant Labs: All pertinent labs within the past 24 hours have been reviewed.    Significant Imaging: I have reviewed all pertinent imaging results/findings within the past 24 hours.  "

## 2022-05-06 NOTE — SUBJECTIVE & OBJECTIVE
"Past Medical History:   Diagnosis Date    ADHD (attention deficit hyperactivity disorder)     Arthritis     Asthma     Bipolar 1 disorder     Cataract     Cigarette smoker     COPD (chronic obstructive pulmonary disease)     Coronary artery disease     A fib    Depression     bipolar manic depresson    Diabetes mellitus     Diabetic foot ulcers     Diabetic neuropathy     DVT of lower extremity, bilateral 07/2013    bilateral LE DVT. [place] filter placed.     Encounter for blood transfusion     History of blood clots 1. Left Leg=2003; 2.Bilateral Groin=Blood Clots= 5 or 6/ 2013 & 7/2013; 3. LLL of Lung=7/2013;  4. Lt. Lower Leg=7/2013.     Pt. had 1st Blood Clot after Yzsykdiliniz=5093, & Last=2013. [place] Filter= Rt.Lateral Neck.    HTN (hypertension) 06/06/2013    Pt states that she does not have hypertension    Hypercholesteremia     Irregular heartbeat     Neuromuscular disorder     neuropathy feet    Obese     PE (pulmonary embolism) 07/2013    bilat LE DVT.     Restless leg syndrome        Past Surgical History:   Procedure Laterality Date    ABDOMINAL SURGERY  2010    gastric sleeve    BILATERAL OOPHORECTOMY Bilateral 1/12/2015    CHOLECYSTECTOMY      [place] filter Right 7/4/2012    Right Neck & Tunneled Down.    HERNIA REPAIR      "[place] of Hernias Repaires around th Belly Button.", pt. states    LAPAROSCOPIC CHOLECYSTECTOMY N/A 9/10/2020    Procedure: CHOLECYSTECTOMY, LAPAROSCOPIC;  Surgeon: Montrell Gutierrez MD;  Location: [facility] OR;  Service: General;  Laterality: N/A;  RN PREOP 9/9----COVID Negative  9/9    OVARIAN CYST REMOVAL  3/13/2014    TX REMOVAL OF OVARY/TUBE(S)      SPLENECTOMY, TOTAL  July 2003    TONSILLECTOMY      as a child    TYMPANOSTOMY TUBE PLACEMENT  1976    VEIN SURGERY  2003    Lt leg       Review of patient's allergies indicates:   Allergen Reactions    Morphine Other (See Comments)     Patient had a psychotic episode after taking Morphine  Agitation, hallucinations    " Penicillins Anaphylaxis     itching    Januvia [sitagliptin] Hives       Medications:  Facility-Administered Medications Prior to Admission   Medication    LIDOcaine HCL 10 mg/ml (1%) injection 1 mL    LIDOcaine-EPINEPHrine 1%-1:100,000 30 mL, LIDOcaine HCL 10 mg/ml (1%) 20 mL, sodium bicarbonate 10 mL in sodium chloride 0.9% 500 mL solution     Medications Prior to Admission   Medication Sig    acetaminophen (TYLENOL) 500 MG tablet Take 2 tablets (1,000 mg total) by mouth every 6 (six) hours as needed for Pain.    albuterol (PROVENTIL/VENTOLIN HFA) 90 mcg/actuation inhaler Inhale 2 puffs into the lungs every 6 (six) hours as needed for Wheezing. Use with spacer  Dispense with 1 spacer    albuterol-ipratropium (DUO-NEB) 2.5 mg-0.5 mg/3 mL nebulizer solution Take 3 mLs by nebulization every 6 (six) hours as needed for Wheezing or Shortness of Breath. Rescue    apixaban (ELIQUIS) 5 mg Tab Take 1 tablet (5 mg total) by mouth 2 (two) times daily.    aspirin 81 MG Chew Take 1 tablet (81 mg total) by mouth once daily.    dicyclomine (BENTYL) 20 mg tablet Take 1 tablet (20 mg total) by mouth every 6 (six) hours.    divalproex (DEPAKOTE) 250 MG EC tablet Take 5 tablets (1,250 mg total) by mouth every evening.    divalproex (DEPAKOTE) 500 MG TbEC Take 1 tablet (500 mg total) by mouth once daily. PO QAM    [] doxycycline (VIBRAMYCIN) 100 MG Cap Take 1 capsule (100 mg total) by mouth every 12 (twelve) hours. for 8 days    DUPIXENT  mg/2 mL PnIj SMARTSI Milligram(s) SUB-Q Every 2 Weeks    EPITOL 200 mg tablet Take 200 mg by mouth 2 (two) times a day.    famotidine (PEPCID) 20 MG tablet Take 20 mg by mouth 2 (two) times daily.    fluticasone propionate (FLONASE) 50 mcg/actuation nasal spray 1 spray (50 mcg total) by Each Nostril route 2 (two) times daily.    furosemide (LASIX) 20 MG tablet TAKE 1 TABLET(20 MG) BY MOUTH EVERY DAY    gabapentin (NEURONTIN) 300 MG capsule TAKE 2 CAPSULES(600 MG) BY MOUTH TWICE  DAILY    hydrOXYzine (ATARAX) 50 MG tablet Take 50 mg by mouth 4 (four) times daily as needed.    ibuprofen (ADVIL,MOTRIN) 600 MG tablet TAKE 1 TABLET(600 MG) BY MOUTH EVERY 8 HOURS AS NEEDED FOR PAIN OR BACK PAIN    lisinopriL 10 MG tablet Take 1 tablet (10 mg total) by mouth once daily.    loratadine (CLARITIN) 10 mg tablet Take 1 tablet (10 mg total) by mouth once daily.    magnesium oxide (MAG-OX) 400 mg (241.3 mg magnesium) tablet Take 1 tablet (400 mg total) by mouth 2 (two) times daily.    metFORMIN (GLUCOPHAGE) 1000 MG tablet TAKE 1 TABLET(1000 MG) BY MOUTH TWICE DAILY WITH MEALS    metoprolol tartrate (LOPRESSOR) 50 MG tablet TAKE 1 TABLET(50 MG) BY MOUTH TWICE DAILY    [] metroNIDAZOLE (FLAGYL) 500 MG tablet Take 1 tablet (500 mg total) by mouth every 8 (eight) hours. for 8 days    OLANZapine (ZYPREXA) 10 MG tablet Take 1 tablet (10 mg total) by mouth every 8 (eight) hours as needed (Agitation).    OPTICHAMBER BIGG LG MASK Spcr Inhale into the lungs.    pantoprazole (PROTONIX) 40 MG tablet Take 1 tablet (40 mg total) by mouth once daily.    polyvinyl alcohol, artificial tears, (LIQUIFILM TEARS) 1.4 % ophthalmic solution Place 1 drop into both eyes 4 (four) times daily.    pravastatin (PRAVACHOL) 40 MG tablet TAKE 1 TABLET(40 MG) BY MOUTH EVERY EVENING    risperiDONE (RISPERDAL M-TABS) 3 MG disintegrating tablet Take 1 tablet (3 mg total) by mouth 2 (two) times daily.    senna (SENOKOT) 8.6 mg tablet Take 1 tablet by mouth 2 (two) times a day.    blood sugar diagnostic Strp To check BG two  times daily, to use with insurance preferred meter (Patient taking differently: To check BG two  times daily, to use with insurance preferred meter)    blood-glucose meter kit To check BG once daily, to use with insurance preferred meter    blood-glucose meter kit To check BG two times daily, to use with insurance preferred meter    fluticasone-salmeterol diskus inhaler 250-50 mcg Inhale 1 puff into the lungs 2  "(two) times daily. Controller    hydrOXYzine pamoate (VISTARIL) 50 MG Cap Take 1 capsule (50 mg total) by mouth 3 (three) times daily.    lancets Misc To check BG two times daily, to use with insurance preferred meter    multivitamin Tab Take 1 tablet by mouth once daily.    TRUE METRIX GLUCOSE METER Misc CHECK BLOOD SUGAR TWICE DAILY     Antibiotics (From admission, onward)                Start     Stop Route Frequency Ordered    05/05/22 1415  ciprofloxacin (CIPRO)400mg/200ml D5W IVPB 400 mg         -- IV Every 12 hours (non-standard times) 05/05/22 1311    05/05/22 1415  metronidazole IVPB 500 mg         -- IV Every 8 hours (non-standard times) 05/05/22 1311    05/04/22 2230  vancomycin (VANCOCIN) 1,750 mg in dextrose 5 % 500 mL IVPB         -- IV Every 12 hours (non-standard times) 05/04/22 1148    05/04/22 1245  mupirocin 2 % ointment         05/09 0859 Nasl 2 times daily 05/04/22 1135    05/04/22 1103  vancomycin - pharmacy to dose  (vancomycin IVPB)        "And" Linked Group Details    -- IV pharmacy to manage frequency 05/04/22 1004          Antifungals (From admission, onward)                None          Antivirals (From admission, onward)      None             Immunization History   Administered Date(s) Administered    Hepatitis B, Adult 01/09/2014, 02/13/2014    Influenza 01/14/2015    Influenza - Quadrivalent 09/18/2015    Influenza - Quadrivalent - PF *Preferred* (6 months and older) 11/18/2016, 10/24/2017, 10/31/2018, 09/04/2019, 09/29/2020, 02/03/2022    Influenza - Trivalent - PF (ADULT) 01/14/2015    Pneumococcal Polysaccharide - 23 Valent 10/24/2017    Tdap 01/09/2014       Family History       Problem Relation (Age of Onset)    Cataracts Father    Diabetes Father, Paternal Grandfather    Heart disease Father, Paternal Grandfather    Hypertension Father    No Known Problems Mother, Sister, Brother, Maternal Aunt, Maternal Uncle, Paternal Aunt, Paternal Uncle, Maternal Grandfather    Ovarian cancer " Maternal Grandmother, Paternal Grandmother          Social History     Socioeconomic History    Marital status: Significant Other   Tobacco Use    Smoking status: Current Every Day Smoker     Packs/day: 1.00     Years: 37.00     Pack years: 37.00     Types: Cigarettes     Last attempt to quit: 2020     Years since quittin.4    Smokeless tobacco: Never Used    Tobacco comment: Enrolled in the [employer] on 5/3/14 ([facility] Member ID # [id number]). Ambulatory referral to Smoking Cessation Program   Substance and Sexual Activity    Alcohol use: No     Alcohol/week: 0.0 standard drinks    Drug use: No    Sexual activity: Yes     Partners: Male   Social History Narrative     from her  of 20 years    Lives by herself since      Review of Systems   Constitutional:  Negative for chills and fever.   Skin:  Positive for wound.   Psychiatric/Behavioral:  The patient is nervous/anxious.    All other systems reviewed and are negative.  Objective:     Vital Signs (Most Recent):  Temp: 98.9 °F (37.2 °C) (22 0836)  Pulse: 75 (22 0850)  Resp: 16 (22 1035)  BP: (!) 141/65 (22 0836)  SpO2: 95 % (22 0850)   Vital Signs (24h Range):  Temp:  [97.3 °F (36.3 °C)-99.7 °F (37.6 °C)] 98.9 °F (37.2 °C)  Pulse:  [67-84] 75  Resp:  [16-20] 16  SpO2:  [93 %-97 %] 95 %  BP: (109-177)/(51-77) 141/65     Weight: 118.5 kg (261 lb 3.9 oz)  Body mass index is 42.17 kg/m².    Estimated Creatinine Clearance: 127.4 mL/min (based on SCr of 0.7 mg/dL).    Physical Exam  Vitals and nursing note reviewed.   Constitutional:       General: She is not in acute distress.     Appearance: Normal appearance. She is not ill-appearing, toxic-appearing or diaphoretic.   HENT:      Head: Normocephalic and atraumatic.      Right Ear: External ear normal.      Left Ear: External ear normal.   Eyes:      Extraocular Movements: Extraocular movements intact.      Pupils: Pupils are equal, round, and reactive to light.    Abdominal:      Tenderness: There is no guarding or rebound.   Musculoskeletal:         General: Swelling and deformity present.   Neurological:      General: No focal deficit present.      Mental Status: She is alert.   Psychiatric:         Mood and Affect: Mood normal.         Behavior: Behavior normal.       Significant Labs: Blood Culture:   Recent Labs   Lab 04/13/22  0309 04/18/22  1946 05/04/22  0944 05/04/22  0947   LABBLOO No growth after 5 days.  No growth after 5 days. No growth after 5 days.  No growth after 5 days. Gram stain abbe bottle: Gram positive cocci in clusters resembling Staph   Results called to and read back by: Linnette Calix- 3W 05/05/2022    09:58 No Growth to date  No Growth to date     CBC:   Recent Labs   Lab 05/05/22  0555 05/05/22  1510 05/06/22  0536   WBC 19.24*  --  14.00*   HGB 15.5 6.8* 7.3*   HCT 46.3  --  22.0*   PLT SEE COMMENT  --  601*     CMP:   Recent Labs   Lab 05/05/22  1511 05/05/22  2138 05/06/22  0536   * 117* 119*   K 5.2* 5.2* 5.7*   CL 86* 87* 88*   CO2 22* 25 26   * 150* 110   BUN 14 14 13   CREATININE 0.7 0.7 0.7   CALCIUM 7.6* 7.7* 7.6*   ANIONGAP 8 5* 5*   EGFRNONAA >60 >60 >60     Wound Culture:   Recent Labs   Lab 04/13/22  1234 05/04/22  1500   LABAERO METHICILLIN RESISTANT STAPHYLOCOCCUS AUREUS  Many  Skin rosenda also present  * Results called to and read back by: Linnette Alvarado 05/06/2022  08:40  METHICILLIN RESISTANT STAPHYLOCOCCUS AUREUS  Many  *  GRAM NEGATIVE PEEWEE  >100,000 cfu/ml  Identification and susceptibility pending  *  GRAM NEGATIVE PEEWEE  Moderate  Identification and susceptibility pending  *  STAPHYLOCOCCUS AUREUS  Many  Susceptibility pending  *       Significant Imaging: I have reviewed all pertinent imaging results/findings within the past 24 hours.
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
## 2022-05-06 NOTE — ASSESSMENT & PLAN NOTE
Pt is currently stable on Depakote  mg q AM and 1250 mg q PM along with risperidone 2 mg q AM and 3 mg q PM.  If still here get a VPA level in 3 more days.  She seems very aware of her surroundings and understands her medical condition and possible need for amputation.  There is no need for PEC or [facility] facility transfer at this time.
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
## 2022-05-06 NOTE — ASSESSMENT & PLAN NOTE
Presents with a sodium of 118 which is new. Worsened to 113. She is currently asymptomatic. Cause= carbamazepine  - consulted Nephrology for help with management  - continues on IVF  - monitor BMP q6h

## 2022-05-06 NOTE — PROGRESS NOTES
[place] - Telemetry  Podiatry  Consult Note    Patient Name: Audrey Natarajan  MRN: [id number]  Admission Date: 5/4/2022  Hospital Length of Stay: 2 days  Attending Physician: Keyona Darden MD  Primary Care Provider: Donaldo Pena MD     Consults  Subjective:     History of Present Illness: 50 y/o female PMH DM2, bipolar admitted for wound infection B/L. Patient recently discharged from [facility] on 4/26/22. Patient reports unable to take medications as family member was hiding meds from her. Seen earlier today in podiatry clinic Dr. De Los Santos, sent to ED for admission.     5/6/22: Patient seen bedside. Bandages intact B/L       Scheduled Meds:   aspirin  81 mg Oral Daily    ciprofloxacin  400 mg Intravenous Q12H    divalproex  1,250 mg Oral QHS    divalproex  500 mg Oral Daily    docusate sodium  100 mg Oral Daily    fluticasone furoate-vilanteroL  1 puff Inhalation Daily    hydrOXYzine pamoate  50 mg Oral TID    metoprolol tartrate  50 mg Oral BID    metronidazole  500 mg Intravenous Q8H    mupirocin   Nasal BID    pantoprazole  40 mg Intravenous BID    pravastatin  40 mg Oral QHS    risperiDONE  2 mg Oral Daily    risperiDONE  3 mg Oral QHS    vancomycin (VANCOCIN) IVPB  1,750 mg Intravenous Q12H     Continuous Infusions:   sodium chloride 0.9% 100 mL/hr at 05/05/22 2223     PRN Meds:sodium chloride, acetaminophen, dextrose 10%, dextrose 10%, glucagon (human recombinant), glucagon (human recombinant), glucose, glucose, insulin aspart U-100, naloxone, naloxone, OLANZapine, oxyCODONE-acetaminophen, sodium chloride 0.9%, sodium chloride 0.9%, Pharmacy to dose Vancomycin consult **AND** vancomycin - pharmacy to dose    Review of patient's allergies indicates:   Allergen Reactions    Morphine Other (See Comments)     Patient had a psychotic episode after taking Morphine  Agitation, hallucinations    Penicillins Anaphylaxis     itching    Januvia [sitagliptin] Hives        Past Medical  "History:   Diagnosis Date    ADHD (attention deficit hyperactivity disorder)     Arthritis     Asthma     Bipolar 1 disorder     Cataract     Cigarette smoker     COPD (chronic obstructive pulmonary disease)     Coronary artery disease     A fib    Depression     bipolar manic depresson    Diabetes mellitus     Diabetic foot ulcers     Diabetic neuropathy     DVT of lower extremity, bilateral 07/2013    bilateral LE DVT. [place] filter placed.     Encounter for blood transfusion     History of blood clots 1. Left Leg=2003; 2.Bilateral Groin=Blood Clots= 5 or 6/ 2013 & 7/2013; 3. LLL of Lung=7/2013;  4. Lt. Lower Leg=7/2013.     Pt. had 1st Blood Clot after Tisrgvtxnzhb=8852, & Last=2013. [place] Filter= Rt.Lateral Neck.    HTN (hypertension) 06/06/2013    Pt states that she does not have hypertension    Hypercholesteremia     Irregular heartbeat     Neuromuscular disorder     neuropathy feet    Obese     PE (pulmonary embolism) 07/2013    bilat LE DVT.     Restless leg syndrome      Past Surgical History:   Procedure Laterality Date    ABDOMINAL SURGERY  2010    gastric sleeve    BILATERAL OOPHORECTOMY Bilateral 1/12/2015    CHOLECYSTECTOMY      Green' s filter Right 7/4/2012    Right Neck & Tunneled Down.    HERNIA REPAIR      "[place] of Hernias Repaires around th [place].", pt. states    LAPAROSCOPIC CHOLECYSTECTOMY N/A 9/10/2020    Procedure: CHOLECYSTECTOMY, LAPAROSCOPIC;  Surgeon: Montrell Gutierrez MD;  Location: [facility];  Service: General;  Laterality: N/A;  RN PREOP 9/9----COVID Negative  9/9    OVARIAN CYST REMOVAL  3/13/2014    [place] REMOVAL OF OVARY/TUBE(S)      SPLENECTOMY, TOTAL  July 2003    TONSILLECTOMY      as a child    TYMPANOSTOMY TUBE PLACEMENT  1976    VEIN SURGERY  2003    Lt leg       Family History     Problem Relation (Age of Onset)    Cataracts Father    Diabetes Father, Paternal Grandfather    Heart disease Father, Paternal Grandfather    " Hypertension Father    No Known Problems Mother, Sister, Brother, Maternal Aunt, Maternal Uncle, Paternal Aunt, Paternal Uncle, Maternal Grandfather    Ovarian cancer Maternal Grandmother, Paternal Grandmother        Tobacco Use    Smoking status: Current Every Day Smoker     Packs/day: 1.00     Years: 37.00     Pack years: 37.00     Types: Cigarettes     Last attempt to quit: 2020     Years since quittin.4    Smokeless tobacco: Never Used    Tobacco comment: Enrolled in the [employer] on 5/3/14 ([facility] Member ID # [id number]). Ambulatory referral to Smoking Cessation Program   Substance and Sexual Activity    Alcohol use: No     Alcohol/week: 0.0 standard drinks    Drug use: No    Sexual activity: Yes     Partners: Male     Review of Systems   Constitutional: Negative.    Respiratory: Negative.    Genitourinary: Negative.    Musculoskeletal: Positive for arthralgias.   Skin: Positive for wound.   Psychiatric/Behavioral: Negative.      Objective:     Vital Signs (Most Recent):  Temp: 97.7 °F (36.5 °C) (22 1221)  Pulse: 63 (22 1221)  Resp: 18 (22 1221)  BP: 133/62 (22 1221)  SpO2: 97 % (22 1221) Vital Signs (24h Range):  Temp:  [97.3 °F (36.3 °C)-99.7 °F (37.6 °C)] 97.7 °F (36.5 °C)  Pulse:  [63-84] 63  Resp:  [16-20] 18  SpO2:  [93 %-97 %] 97 %  BP: (109-177)/(51-77) 133/62     Weight: 118.5 kg (261 lb 3.9 oz)  Body mass index is 42.17 kg/m².    Foot Exam    General  Orientation: alert and oriented to person, place, and time       Right Foot/Ankle     Neurovascular  Dorsalis pedis: 1+  Posterior tibial: 1+  Saphenous nerve sensation: diminished  Tibial nerve sensation: diminished  Superficial peroneal nerve sensation: diminished  Deep peroneal nerve sensation: diminished  Sural nerve sensation: diminished      Left Foot/Ankle      Neurovascular  Dorsalis pedis: 1+  Posterior tibial: 1+  Saphenous nerve sensation: diminished  Tibial nerve sensation:  diminished  Superficial peroneal nerve sensation: diminished  Deep peroneal nerve sensation: diminished  Sural nerve sensation: diminished          5/6/22:     Wound 1: Left plantar foot   Measurement: 6lwf6oqt4.3cm  pre debridement 5.5cmx5.5cmx0.4cm post debridement.  Base: fibrogranular base   Periwound skin: HPK, maceration   Drainage: serous   Erythema: mild  Probe: deep probe         Pre debridement       Post debridement             5/4/22:    Right foot- Epic unable to load image  Right plantar hallux- probe to periosteum fibrotic base  Right plantar forefoot - wound with fibrotic base.   Left foot  Left plantar foot ulceration with necrotic base deep probe to bone        Laboratory:  CBC:   Recent Labs   Lab 05/06/22  0536   WBC 14.00*   RBC 2.65*   HGB 7.3*   HCT 22.0*   *   MCV 83   MCH 27.5   MCHC 33.2     CMP:   Recent Labs   Lab 05/04/22  0944 05/04/22  1814 05/06/22  1155   *   < > 138*   CALCIUM 8.0*   < > 7.7*   ALBUMIN 2.1*  --   --    PROT 6.2  --   --    *   < > 118*   K 5.0   < > 5.0   CO2 25   < > 25   CL 85*   < > 89*   BUN 12   < > 11   CREATININE 0.6   < > 0.6   ALKPHOS 136*  --   --    ALT 14  --   --    AST 18  --   --    BILITOT 0.3  --   --     < > = values in this interval not displayed.       Diagnostic Results:  Xray:  X-Ray Foot Complete Right  Order: [id number]   Status: Final result     Visible to patient: Yes (not seen)     Next appt: 05/09/2022 at 02:00 PM in Gastroenterology (Saloni De Anda MD)     Dx: Infection     0 Result Notes    Details    Reading Physician Reading Date Result Priority   Josr Almanzar MD  [phone] 5/4/2022 STAT     Narrative & Impression  EXAMINATION:  XR FOOT COMPLETE 3 VIEW RIGHT     CLINICAL HISTORY:  . Unspecified infectious disease     TECHNIQUE:  AP, lateral, and oblique views of the right foot were performed.     COMPARISON:  Right foot radiograph 04/20/2022.     FINDINGS:  No definite evidence of acute  fracture or dislocation.  Lisfranc joint appears congruent.  There appears to be chronic appearing deformity at the 4th digit metatarsal head and neck with erosive change at the lateral aspect of the head.     Joint spaces appear to be maintained.  There is soft tissue swelling most pronounced at the dorsum of the mid and forefoot.  No definite radiopaque foreign body.  Enthesopathic change at the calcaneus.     Impression:     No definite evidence of acute fracture or dislocation.     MRI: MRI Foot (Forefoot) Right Without Contrast  Order: [id number]   Status: Final result     Visible to patient: Yes (not seen)     Next appt: 05/09/2022 at 02:00 PM in Gastroenterology (Saloni De Anda MD)     Dx: Other acute osteomyelitis, unspecifie...     0 Result Notes    Details    Reading Physician Reading Date Result Priority   Tu Santos Jr., MD  [phone]  [phone] 5/6/2022 Routine     Narrative & Impression  EXAMINATION:  MRI FOOT (FOREFOOT) RIGHT WITHOUT CONTRAST     CLINICAL HISTORY:  Osteomyelitis, foot;eval OM, abscess right  plantar forefoot ulceration right hallux;  Other acute osteomyelitis, unspecified ankle and foot     TECHNIQUE:  Noncontrast MRI of the right forefoot     COMPARISON:  X-ray 05/04/2022     FINDINGS:  There are hammertoe deformities of digits 2 through 4.  There is a fracture of the 4th metatarsal head without surrounding marrow edema but with visualization of the fracture line suggesting that it may be subacute to chronic.     There is soft tissue ulceration and skin thickening of the distal aspect of the great toe with subtle high T2 and low T1 signal involving the distal tuft compatible with cellulitis and osteomyelitis.  No focal soft tissue fluid collection.  Diffuse dorsal forefoot soft tissue edema is present and there are chronic denervation changes throughout the muscles.     Impression:     Cellulitis of the forefoot and great toe with osteomyelitis of the distal tuft of the  1st digit     Fracture of the 4th metatarsal neck, possibly subacute to chronic.           MRI: MRI Foot (Midfoot) Left Without Contrast  Order: [id number]   Status: Final result     Visible to patient: Yes (not seen)     Next appt: 05/09/2022 at 02:00 PM in Gastroenterology (Saloni De Anda MD)     Dx: Other acute osteomyelitis, unspecifie...     0 Result Notes    Details    Reading Physician Reading Date Result Priority   uT Santos Jr., MD  [phone] 5/6/2022 Routine     Narrative & Impression  EXAMINATION:  MRI FOOT (MIDFOOT) LEFT WITHOUT CONTRAST     CLINICAL HISTORY:  Osteomyelitis, foot;R/o abscess, OM left plantar foot ulceration;  Other acute osteomyelitis, unspecified ankle and foot     TECHNIQUE:  Multiplanar multisequence MRI of the left midfoot without intravenous contrast.     COMPARISON:  X-ray 04/20/2022     FINDINGS:  Diffuse advanced destructive arthropathic changes are seen throughout the midfoot with abnormal flatfoot deformity.  There is marked skin thickening and a large area of soft tissue ulceration at the plantar aspect of the midfoot without localized fluid collection or evidence of a sinus tract extending to the bone.  Findings are associated with disruption the central aspect of the plantar aponeurosis and diffuse high signal within the plantar muscles of the midfoot.  No definite fatty atrophy.     Diffuse forefoot and midfoot soft tissue edema is     Impression:     Midfoot and forefoot cellulitis and plantar soft tissue phlegmon.     Destructive arthropathy of the midfoot most likely related to Charcot changes with superimposed septic arthritis difficult to exclude with certainty given the proximity to the foot ulcer and adjacent inflammatory change.     No drainable abscess.          Arterial US:  Contains abnormal data US Lower Extremity Arteries Bilateral  Order: [id number]   Status: Final result     Visible to patient: Yes (not seen)     Next appt: 05/09/2022  at 02:00 PM in Gastroenterology (Saloni De Anda MD)     Dx: Diabetic ulcer of other part of foot ...     0 Result Notes    Details    Reading Physician Reading Date Result Priority   Samuel Harris MD  [phone] 5/6/2022 Routine     Narrative & Impression  EXAMINATION:  US LOWER EXTREMITY ARTERIES BILATERAL     CLINICAL HISTORY:  PAD eval;     TECHNIQUE:  Bilateral lower extremity arterial duplex ultrasound examination performed. Multiple gray scale and color doppler images were obtained in addition to waveform analysis.     COMPARISON:  Left lower extremity arterial Doppler 07/13/2021     FINDINGS:  Right lower extremity     Common femoral artery: 111-cm/sec; triphasic waveforms     Deep femoral artery, proximal: 70-cm/sec; triphasic waveforms     Superficial femoral artery, proximal: 100-cm/sec; triphasic waveforms     Superficial femoral artery, mid portion: 135-cm/sec; triphasic waveforms     Superficial femoral artery, distal: 137-cm/sec; triphasic waveforms     Popliteal artery, proximal: 83-cm/sec; triphasic waveforms     Popliteal artery, distal: 125-cm/sec; triphasic waveforms     Anterior tibial artery: 121-cm/sec; triphasic waveforms     Posterior tibial artery: 48-cm/sec; triphasic waveforms     Peroneal artery: 48-cm/sec; triphasic waveforms     Dorsalis pedis artery: 133-cm/sec; triphasic waveforms     Left lower extremity     Common femoral artery: 120-cm/sec; triphasic waveforms     Deep femoral artery, proximal: 84-cm/sec; triphasic waveforms     Superficial femoral artery, proximal: 150-cm/sec; triphasic waveforms     Superficial femoral artery, mid portion: 189-cm/sec; triphasic waveforms     Superficial femoral artery, distal: 157-cm/sec; triphasic waveforms     Popliteal artery, proximal: 114-cm/sec; triphasic waveforms     Popliteal artery, distal: 139-cm/sec; triphasic waveforms     Anterior tibial artery: 58-cm/sec; triphasic waveforms     Posterior tibial artery: 61-cm/sec;  triphasic waveforms     Peroneal artery: 48-cm/sec; triphasic waveforms     Dorsalis pedis artery: 177-cm/sec; triphasic waveforms     Impression:     1. Sonogram suggest hemodynamically significant stenosis in the left and DPA.  2. Multifocal mild to moderate grade stenoses is suspected throughout the left MIRACLE and, bilateral posterior tibial and peroneal arteries.  This report was flagged in Epic as abnormal.             Clinical Findings:  B/L ulceration probe to periosteum right hallux     Assessment/Plan:     Active Diagnoses:    Diagnosis Date Noted POA    PRINCIPAL PROBLEM:  Diabetic foot ulcer associated with type 2 diabetes mellitus [E11.621, L97.509] 04/13/2022 Yes    Hyponatremia [E87.1] 05/04/2022 Yes    Sepsis due to methicillin resistant Staphylococcus aureus (MRSA) [A41.02] 04/18/2022 Yes    Acute deep vein thrombosis (DVT) of both lower extremities [I82.403] 04/13/2022 Yes    Anemia [D64.9] 04/13/2022 Yes    Cellulitis of lower extremity [L03.119] 02/10/2021 Yes    Hypercoagulable state [D68.59] 09/25/2019 Yes    Obesity [E66.9] 08/10/2016 Yes    Leukocytosis [D72.829] 07/16/2016 Yes    Bipolar 1 disorder [F31.9] 04/13/2015 Yes     Chronic    COPD (chronic obstructive pulmonary disease) [J44.9] 10/11/2013 Yes     Chronic      Problems Resolved During this Admission:       Discussed two options with patient. Amputation of  Right hallux, L BKA   vs IV abx for six weeks with aggressive wound care for several months with no guarantee of wound resolution.  Patient declines amputation of right hallux, also declines L BKA. Elects for IV abx. Bone biopsy  Performed see procedure note.   Also dicussed with patient's sister Anitra over the phone per patient request.   Abx per ID    Vascular surgery consulted.     Podiatry will follow.       Bone Biopsy    Written consent obtained from patient. Time out performed to verify correct site was identified prior to initiation of procedure.    The patient  was lying on his bed in his room where 3 cc 2% Lido P local anesthetic was injected into the base of the right  hallux. The patient was prepped and draped in the usual sterile fashion. A jamshidi needle was used to obtain 2 pieces of bone from the distal phalanx right hallux . The specimen was sent to Pathology and for culture and sensitivity. Hemostasis was achieved with direct pressure. The site was covered with a bandage of silver rope, 4x4 gauze and coban     Attending: Halle Gill DPM  Assistant: None   Estimated blood loss: <5 mL  Specimen removed: Bone of distal phalanx right hallux    Next written consent obtained , a sterile #7 curette  was used to excisionally debride viable and non viable tissue on the plantar aspect of both feet. Tissue debrided through epidermis, dermis, and subcutaneous tissue. Tissue excised included epidermis, dermis, sucutaneous tissue, fibrin, biofilm, and callus. Wound cleansed with saline. Betadine applied to wound covered with aquacel Ag wrapped with cast padding, kerlix and ACE.     Currently patient with hyponatremia, not optimized for OR debridement. Will plan for further OR debridement once optimized.     Thank you for your consult. I will follow-up with patient. Please contact us if you have any additional questions.    Halle Gill DPM  Podiatry  [facility] [place] - Telemetry

## 2022-05-06 NOTE — PLAN OF CARE
[facility] - Telemetry  Initial Discharge Assessment       Primary Care Provider: Donaldo Pena MD    Admission Diagnosis: Cellulitis [L03.90]  Infection [B99.9]  Wound infection [T14.8XXA, L08.9]  Chest pain [R07.9]    Admission Date: 5/4/2022  Expected Discharge Date:     Discharge Barriers Identified: None    Payor: MEDICAID / Plan: HE[place] BLUE [place] / Product Type: Managed Medicaid /     Extended Emergency Contact Information  Primary Emergency Contact: Anitra Cain   [facility]  Home Phone: [phone]  Relation: Sister  Secondary Emergency Contact: Tabitha Man   [facility]  Home Phone: [phone]  Relation: Other  Mother: Catia Weathers  Mobile Phone: [phone]    Discharge Plan A: Long-term acute care facility (LTAC)  Discharge Plan B: Skilled Nursing Facility      [id number] - [place] - [address] [facility] [address] [facility] [address] [facility]  [address] [place] [address]  [place] [address]  Phone: [phone] Fax: [phone]    [facility] [address] [facility] [place] [address] [facility]  [address] [facility] [address]  Phone: [phone] Fax: [phone]    [id number] - [place], [place] - [address] AT [facility]  [address]  [address]  [facility] [address]  Phone: [phone] Fax: [phone]    [facility] [address]  [place] - [address]  [address]  [place] [address]  Phone: [phone] Fax: [phone]    [id number] - [place] - [address] [facility] [address]  [address] [place] [address]  Phone: [phone] Fax: [phone]    [id number] - [place] - [address] [facility]  [address] [place] [address]  Phone: [phone] Fax: [phone]      Initial Assessment (most recent)     Adult Discharge Assessment - 05/06/22  1547        Discharge Assessment    Assessment Type Discharge Planning Assessment     Confirmed/corrected address, phone number and insurance Yes     Confirmed Demographics Correct on Facesheet     Source of Information patient;health record     When was your last doctors appointment? --   Sometime in Feb    Communicated HUMBERTO with patient/caregiver Yes     Reason For Admission Foot wound     Lives With --   Step daughter staying with her    Do you expect to return to your current living situation? Yes     Do you have help at home or someone to help you manage your care at home? Yes     Who are your caregiver(s) and their phone number(s)? Grandmother, mother, Godparents and [employer] family     Prior to hospitilization cognitive status: --   Hx Bipolar 1    Current cognitive status: Alert/Oriented   Hx Bipolar 1    Walking or Climbing Stairs Difficulty ambulation difficulty, requires equipment;stair climbing difficulty, requires equipment     Dressing/Bathing Difficulty bathing difficulty, assistance 1 person     Home Accessibility stairs to enter home     Number of Stairs, Main Entrance two     Surface of Stairs, Main Entrance other (see comments)     Stair Railings, Main Entrance railings safe and in good condition     Home Layout Able to live on 1st floor     Equipment Currently Used at Home walker, rolling;cane, straight;nebulizer     Readmission within 30 days? No     Patient currently being followed by outpatient case management? No     Do you currently have service(s) that help you manage your care at home? No     Do you take prescription medications? Yes     Do you have prescription coverage? Yes     Do you have any problems affording any of your prescribed medications? No     Is the patient taking medications as prescribed? yes     Who is going to help you get home at discharge? see care givers above     How do you get to doctors appointments? car, drives self     Are you on dialysis? No     Do you take  coumadin? No     Discharge Plan A Long-term acute care facility (LTAC)     Discharge Plan B Skilled Nursing Facility     DME Needed Upon Discharge  --   tbd    Discharge Plan discussed with: Patient     Discharge Barriers Identified None

## 2022-05-06 NOTE — PLAN OF CARE
Problem: Skin Injury Risk Increased  Goal: Skin Health and Integrity  Outcome: Ongoing, Progressing     Problem: Fall Injury Risk  Goal: Absence of Fall and Fall-Related Injury  Outcome: Ongoing, Progressing     Problem: Diabetes Comorbidity  Goal: Blood Glucose Level Within Targeted Range  Outcome: Ongoing, Progressing     Problem: Fluid and Electrolyte Imbalance (Acute Kidney Injury/Impairment)  Goal: Fluid and Electrolyte Balance  Outcome: Ongoing, Progressing     Problem: Oral Intake Inadequate (Acute Kidney Injury/Impairment)  Goal: Optimal Nutrition Intake  Outcome: Ongoing, Progressing

## 2022-05-06 NOTE — ASSESSMENT & PLAN NOTE
Present since 2021. Still present 4/2022  - holding anticoagulation with anemia requiring transfusion

## 2022-05-06 NOTE — PLAN OF CARE
05/06/22 1607   Post-Acute Status   Post-Acute Authorization Placement   Post-Acute Placement Status Referrals Sent   CM met with patient and Dr Gill at bedside.  Dr Gill agreed that LTAC would be best possible DC plan.  Patient agreed to [facility].  Referral sent to [facility] and Karishma notified.

## 2022-05-06 NOTE — ASSESSMENT & PLAN NOTE
Hgb decreased. No bleeding noted. Required 1U RBC transfusion on 5/5 with inappropriate response  - holding anticoagulation for now  - CBC in AM  - TSAT low

## 2022-05-06 NOTE — PROGRESS NOTES
Pharmacokinetic Assessment Follow Up: IV Vancomycin    Vancomycin serum concentration assessment(s):    The trough level was not drawn as ordered and cannot be used to guide therapy at this time.    Vancomycin Regimen Plan:    Continue regimen to Vancomycin 1750 mg IV every 12 hours with next serum trough concentration measured at 2130 prior to next dose on 5/6/2022    Drug levels (last 3 results):  Recent Labs   Lab Result Units 05/05/22  2138   Vancomycin-Trough ug/mL 8.4*       Pharmacy will continue to follow and monitor vancomycin.    Please contact pharmacy at extension [phone] for questions regarding this assessment.    Thank you for the consult,   Ja Carroll Jr       Patient brief summary:  Audrey Natarajan is a 51 y.o. female initiated on antimicrobial therapy with IV Vancomycin for treatment of skin & soft tissue infection    Drug Allergies:   Review of patient's allergies indicates:   Allergen Reactions    Morphine Other (See Comments)     Patient had a psychotic episode after taking Morphine  Agitation, hallucinations    Penicillins Anaphylaxis     itching    Januvia [sitagliptin] Hives       Actual Body Weight:   118.5 kg    Renal Function:   Estimated Creatinine Clearance: 127.4 mL/min (based on SCr of 0.7 mg/dL).,     Dialysis Method (if applicable):  N/A    CBC (last 72 hours):  Recent Labs   Lab Result Units 05/04/22  0944 05/05/22  0555 05/05/22  1510 05/06/22  0536   WBC K/uL 20.54* 19.24*  --  14.00*   Hemoglobin g/dL 7.3* 15.5 6.8* 7.3*   Hematocrit % 21.3* 46.3  --  22.0*   Platelets K/uL 628* SEE COMMENT  --  601*   Gran % % 74.1* 72.4  --  76.7*   Lymph % % 9.6* 12.1*  --  10.1*   Mono % % 15.1* 11.2  --  10.1   Eosinophil % % 0.0 1.8  --  0.9   Basophil % % 0.2 0.5  --  0.2   Differential Method  Automated Automated  --  Automated       Metabolic Panel (last 72 hours):  Recent Labs   Lab Result Units 05/04/22  0944 05/04/22  1814 05/04/22 2028 05/04/22  2334 05/05/22  0555  05/05/22  0830 05/05/22  1511 05/05/22  2138 05/06/22  0536   Sodium mmol/L 118* 116*  --  118* 113* 116* 116* 117* 119*   Sodium, Urine mmol/L  --   --  <20*  --   --   --   --   --   --    Potassium mmol/L 5.0 5.1  --  5.7* 5.7* 5.4* 5.2* 5.2* 5.7*   Chloride mmol/L 85* 86*  --  86* 85* 85* 86* 87* 88*   CO2 mmol/L 25 24  --  22* 21* 24 22* 25 26   Glucose mg/dL 125* 147*  --  114* 90 92 136* 150* 110   Glucose, UA   --   --  Negative  --   --   --   --   --   --    BUN mg/dL 12 12  --  13 13 13 14 14 13   Creatinine mg/dL 0.6 0.7  --  0.7 0.7 0.7 0.7 0.7 0.7   Albumin g/dL 2.1*  --   --   --   --   --   --   --   --    Total Bilirubin mg/dL 0.3  --   --   --   --   --   --   --   --    Alkaline Phosphatase U/L 136*  --   --   --   --   --   --   --   --    AST U/L 18  --   --   --   --   --   --   --   --    ALT U/L 14  --   --   --   --   --   --   --   --    Magnesium mg/dL  --   --   --   --  1.5*  --   --   --  1.8   Phosphorus mg/dL  --   --   --   --  3.3  --   --   --  3.0       Vancomycin Administrations:  vancomycin given in the last 96 hours                     vancomycin (VANCOCIN) 1,750 mg in dextrose 5 % 500 mL IVPB (mg) 1,750 mg New Bag 05/06/22 1038     1,750 mg New Bag 05/05/22 2235     1,750 mg New Bag 05/04/22 2235    vancomycin (VANCOCIN) 2,000 mg in dextrose 5 % 500 mL IVPB (mg) 2,000 mg New Bag 05/04/22 1026                    Microbiologic Results:  Microbiology Results (last 7 days)       Procedure Component Value Units Date/Time    Aerobic culture [[id number]]  (Abnormal)  (Susceptibility) Collected: 05/04/22 1500    Order Status: Completed Specimen: Wound from Foot, Right Updated: 05/06/22 1126     Aerobic Bacterial Culture Results called to and read back by: Linnette Alvarado 05/06/2022  08:40      ESCHERICHIA COLI  Many        ENTEROBACTER CLOACAE  Moderate        METHICILLIN RESISTANT STAPHYLOCOCCUS AUREUS  Many      Blood culture x two cultures. Draw prior to antibiotics. [[id number]]  Collected: 05/04/22 0947    Order Status: Completed Specimen: Blood from Peripheral, Antecubital, Left Updated: 05/06/22 1103     Blood Culture, Routine No Growth to date      No Growth to date      No Growth to date    Narrative:      Aerobic and anaerobic    Blood culture x two cultures. Draw prior to antibiotics. [[id number]]  (Abnormal) Collected: 05/04/22 0944    Order Status: Completed Specimen: Blood from Peripheral, Antecubital, Right Updated: 05/06/22 1101     Blood Culture, Routine Gram stain abbe bottle: Gram positive cocci in clusters resembling Staph       Results called to and read back by: Linnette KRAMER 05/05/2022        09:58      STAPHYLOCOCCUS AUREUS  Many  Susceptibility pending      Narrative:      Aerobic and anaerobic    Blood culture [[id number]]     Order Status: Sent Specimen: Blood     Blood culture [[id number]]     Order Status: Sent Specimen: Blood     Aerobic culture [[id number]]  (Abnormal)  (Susceptibility) Collected: 05/04/22 1500    Order Status: Completed Specimen: Wound from Foot, Left Updated: 05/06/22 0844     Aerobic Bacterial Culture Results called to and read back by: Linnette Alvarado 05/06/2022  08:40      METHICILLIN RESISTANT STAPHYLOCOCCUS AUREUS  Many

## 2022-05-06 NOTE — PROGRESS NOTES
PLAN OF CARE NOTE   Gastroenterology       Patient is still optimizing to be stable enough for EGD. Sodium currently 119. Patient is stable from a GI bleed standpoint and not had any overt bleeding or drop in levels since admission.     We will sign off at this time. Please re-consult if anything changes and patient has presentation of overt bleeding by BRBPR, melena, hematemesis or levels drop. Thank you for allowing us to participate in her care.     Raine Torres NP  Gastroenterology
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
## 2022-05-06 NOTE — ASSESSMENT & PLAN NOTE
"This patient does have evidence of infective focus- bilateral DM foot wounds and bacteremia  My overall impression is sepsis. Vital signs were reviewed and noted in progress note.  Antibiotics given-   Antibiotics (From admission, onward)            Start     Stop Route Frequency Ordered    05/05/22 1415  ciprofloxacin (CIPRO)400mg/200ml D5W IVPB 400 mg         -- IV Every 12 hours (non-standard times) 05/05/22 1311    05/05/22 1415  metronidazole IVPB 500 mg         -- IV Every 8 hours (non-standard times) 05/05/22 1311    05/04/22 2230  vancomycin (VANCOCIN) 1,750 mg in dextrose 5 % 500 mL IVPB         -- IV Every 12 hours (non-standard times) 05/04/22 1148    05/04/22 1245  mupirocin 2 % ointment         05/09 0859 Nasl 2 times daily 05/04/22 1135    05/04/22 1103  vancomycin - pharmacy to dose  (vancomycin IVPB)        "And" Linked Group Details    -- IV pharmacy to manage frequency 05/04/22 1004        Cultures were taken-   Microbiology Results (last 7 days)     Procedure Component Value Units Date/Time    Gram stain [[id number]] Collected: 05/06/22 1249    Order Status: Completed Specimen: Bone from Toe, Right Foot Updated: 05/06/22 1446     Gram Stain Result No organisms seen    Aerobic culture [[id number]] Collected: 05/06/22 1249    Order Status: Sent Specimen: Bone from Toe, Right Foot Updated: 05/06/22 1302    AFB Culture & Smear [[id number]] Collected: 05/06/22 1249    Order Status: Sent Specimen: Bone from Toe, Right Foot Updated: 05/06/22 1302    Fungus culture [[id number]] Collected: 05/06/22 1249    Order Status: Sent Specimen: Bone from Toe, Right Foot Updated: 05/06/22 1302    Blood culture [[id number]] Collected: 05/06/22 1155    Order Status: Sent Specimen: Blood from Antecubital, Right Hand Updated: 05/06/22 1210    Blood culture [[id number]] Collected: 05/06/22 1155    Order Status: Sent Specimen: Blood from Peripheral, Left Hand Updated: 05/06/22 1210    Aerobic culture [[id number]]  (Abnormal)  " (Susceptibility) Collected: 05/04/22 1500    Order Status: Completed Specimen: Wound from Foot, Right Updated: 05/06/22 1126     Aerobic Bacterial Culture Results called to and read back by: Linnette Alvarado 05/06/2022  08:40      ESCHERICHIA COLI  Many        ENTEROBACTER CLOACAE  Moderate        METHICILLIN RESISTANT STAPHYLOCOCCUS AUREUS  Many      Blood culture x two cultures. Draw prior to antibiotics. [[id number]] Collected: 05/04/22 0947    Order Status: Completed Specimen: Blood from Peripheral, Antecubital, Left Updated: 05/06/22 1103     Blood Culture, Routine No Growth to date      No Growth to date      No Growth to date    Narrative:      Aerobic and anaerobic    Blood culture x two cultures. Draw prior to antibiotics. [[id number]]  (Abnormal) Collected: 05/04/22 0944    Order Status: Completed Specimen: Blood from Peripheral, Antecubital, Right Updated: 05/06/22 1101     Blood Culture, Routine Gram stain abbe bottle: Gram positive cocci in clusters resembling Staph       Results called to and read back by: Linnette KRAMER 05/05/2022        09:58      STAPHYLOCOCCUS AUREUS  Many  Susceptibility pending      Narrative:      Aerobic and anaerobic    Aerobic culture [[id number]]  (Abnormal)  (Susceptibility) Collected: 05/04/22 1500    Order Status: Completed Specimen: Wound from Foot, Left Updated: 05/06/22 0844     Aerobic Bacterial Culture Results called to and read back by: Linnette Alvarado 05/06/2022  08:40      METHICILLIN RESISTANT STAPHYLOCOCCUS AUREUS  Many          Latest lactate reviewed, they are-  Recent Labs   Lab 05/04/22 0944 05/04/22  1144   LACTATE 0.8 0.8     Staph bacteremia- repeat blood cultures. TTE ordered.    MRI L foot: mid/forefoot cellulitis, Charcot changes cannot rule out septic arthritis  MRI R foot: cellulitis forefoot and great toe. Osteomyelitis of 1st digit    Podiatry following. Refuses amputation  Vascular consulted for abnormal arterial US  ID consulted

## 2022-05-06 NOTE — CONSULTS
[facility]  Infectious Disease  Consult Note    Patient Name: Audrey Natarajan  MRN: [id number]  Admission Date: 5/4/2022  Hospital Length of Stay: 2 days  Attending Physician: Keyona Darden MD  Primary Care Provider: Donaldo Pena MD     Isolation Status: Contact    Patient information was obtained from patient, past medical records and ER records.      Inpatient consult to Infectious Diseases  Consult performed by: Terry Pereira MD  Consult ordered by: Keyona Darden MD        Assessment/Plan:     * Diabetic foot ulcer associated with type 2 diabetes mellitus  - left foot abscess and osteomyelitis, right foot osteomyelitis  - polymicrobial: MRSA (left and right), GNB (left), likely anaerobes  - f/u MRI  - anticipating debridement/ampution of left foot for source control  - f/u blood cultures and wound cultures  - will adjust abx based on final culture results as well as patient's clinical response to therapy      Thank you for your consult. I will follow-up with patient. Please contact us if you have any additional questions.    Terry Pereira MD  Infectious Disease  [facility]    Subjective:     Principal Problem: Diabetic foot ulcer associated with type 2 diabetes mellitus    HPI: 50 y/o with psychiatric illness - previously under [facility], DM, DVT/PE history with IVC filter in place, s/p splenectomy; admitted with bilateral wounds to feet - left worse than right (left foot with charcot deformity) and recurrent DVTs both legs. The left foot culture collected 4/13 positive for MRSA and finegoldia magna. MRI without definitive evidence of osteo. Continued vanco and flagyl for 14 days total - if discharged would switch vanco to po doxy. Discharged from [place] on 4/26.  Presented to podiatry clinic for evaluation and care of bilateral foot wounds.  Sent to ED and admitted with a worsening foot infection. She was started on vancomycin, Cipro, and Flagyl (anaphylaxis with PCN,  "by report). ID is consulted for diabetic foot infection and bacteremia. The patient is feeling better. Malodor to foot. No fever or chills.      Past Medical History:   Diagnosis Date    ADHD (attention deficit hyperactivity disorder)     Arthritis     Asthma     Bipolar 1 disorder     Cataract     Cigarette smoker     COPD (chronic obstructive pulmonary disease)     Coronary artery disease     A fib    Depression     bipolar manic depresson    Diabetes mellitus     Diabetic foot ulcers     Diabetic neuropathy     DVT of lower extremity, bilateral 07/2013    bilateral LE DVT. [place]cott filter placed.     Encounter for blood transfusion     History of blood clots 1. Left Leg=2003; 2.Bilateral Groin=Blood Clots= 5 or 6/ 2013 & 7/2013; 3. LLL of Lung=7/2013;  4. Lt. Lower Leg=7/2013.     Pt. had 1st Blood Clot after Ufgsutimavei=0680, & Last=2013. Olcott Filter= Rt.Lateral Neck.    HTN (hypertension) 06/06/2013    Pt states that she does not have hypertension    Hypercholesteremia     Irregular heartbeat     Neuromuscular disorder     neuropathy feet    Obese     PE (pulmonary embolism) 07/2013    bilat LE DVT.     Restless leg syndrome        Past Surgical History:   Procedure Laterality Date    ABDOMINAL SURGERY  2010    gastric sleeve    BILATERAL OOPHORECTOMY Bilateral 1/12/2015    CHOLECYSTECTOMY      Green' s filter Right 7/4/2012    Right Neck & Tunneled Down.    HERNIA REPAIR      "[place] of Hernias Repaires around th [place].", pt. states    LAPAROSCOPIC CHOLECYSTECTOMY N/A 9/10/2020    Procedure: CHOLECYSTECTOMY, LAPAROSCOPIC;  Surgeon: Montrell Gutierrez MD;  Location: [facility];  Service: General;  Laterality: N/A;  RN PREOP 9/9----COVID Negative  9/9    OVARIAN CYST REMOVAL  3/13/2014    [place] REMOVAL OF OVARY/TUBE(S)      SPLENECTOMY, TOTAL  July 2003    TONSILLECTOMY      as a child    TYMPANOSTOMY TUBE PLACEMENT  1976    VEIN SURGERY  2003    Lt leg       Review of " patient's allergies indicates:   Allergen Reactions    Morphine Other (See Comments)     Patient had a psychotic episode after taking Morphine  Agitation, hallucinations    Penicillins Anaphylaxis     itching    Januvia [sitagliptin] Hives       Medications:  Facility-Administered Medications Prior to Admission   Medication    LIDOcaine HCL 10 mg/ml (1%) injection 1 mL    LIDOcaine-EPINEPHrine 1%-1:100,000 30 mL, LIDOcaine HCL 10 mg/ml (1%) 20 mL, sodium bicarbonate 10 mL in sodium chloride 0.9% 500 mL solution     Medications Prior to Admission   Medication Sig    acetaminophen (TYLENOL) 500 MG tablet Take 2 tablets (1,000 mg total) by mouth every 6 (six) hours as needed for Pain.    albuterol (PROVENTIL/VENTOLIN HFA) 90 mcg/actuation inhaler Inhale 2 puffs into the lungs every 6 (six) hours as needed for Wheezing. Use with spacer  Dispense with 1 spacer    albuterol-ipratropium (DUO-NEB) 2.5 mg-0.5 mg/3 mL nebulizer solution Take 3 mLs by nebulization every 6 (six) hours as needed for Wheezing or Shortness of Breath. Rescue    apixaban (ELIQUIS) 5 mg Tab Take 1 tablet (5 mg total) by mouth 2 (two) times daily.    aspirin 81 MG Chew Take 1 tablet (81 mg total) by mouth once daily.    dicyclomine (BENTYL) 20 mg tablet Take 1 tablet (20 mg total) by mouth every 6 (six) hours.    divalproex (DEPAKOTE) 250 MG EC tablet Take 5 tablets (1,250 mg total) by mouth every evening.    divalproex (DEPAKOTE) 500 MG TbEC Take 1 tablet (500 mg total) by mouth once daily. PO QAM    [] doxycycline (VIBRAMYCIN) 100 MG Cap Take 1 capsule (100 mg total) by mouth every 12 (twelve) hours. for 8 days    DUPIXENT  mg/2 mL PnIj SMARTSI Milligram(s) SUB-Q Every 2 Weeks    EPITOL 200 mg tablet Take 200 mg by mouth 2 (two) times a day.    famotidine (PEPCID) 20 MG tablet Take 20 mg by mouth 2 (two) times daily.    fluticasone propionate (FLONASE) 50 mcg/actuation nasal spray 1 spray (50 mcg total) by Each  Nostril route 2 (two) times daily.    furosemide (LASIX) 20 MG tablet TAKE 1 TABLET(20 MG) BY MOUTH EVERY DAY    gabapentin (NEURONTIN) 300 MG capsule TAKE 2 CAPSULES(600 MG) BY MOUTH TWICE DAILY    hydrOXYzine (ATARAX) 50 MG tablet Take 50 mg by mouth 4 (four) times daily as needed.    ibuprofen (ADVIL,MOTRIN) 600 MG tablet TAKE 1 TABLET(600 MG) BY MOUTH EVERY 8 HOURS AS NEEDED FOR PAIN OR BACK PAIN    lisinopriL 10 MG tablet Take 1 tablet (10 mg total) by mouth once daily.    loratadine (CLARITIN) 10 mg tablet Take 1 tablet (10 mg total) by mouth once daily.    magnesium oxide (MAG-OX) 400 mg (241.3 mg magnesium) tablet Take 1 tablet (400 mg total) by mouth 2 (two) times daily.    metFORMIN (GLUCOPHAGE) 1000 MG tablet TAKE 1 TABLET(1000 MG) BY MOUTH TWICE DAILY WITH MEALS    metoprolol tartrate (LOPRESSOR) 50 MG tablet TAKE 1 TABLET(50 MG) BY MOUTH TWICE DAILY    [] metroNIDAZOLE (FLAGYL) 500 MG tablet Take 1 tablet (500 mg total) by mouth every 8 (eight) hours. for 8 days    OLANZapine (ZYPREXA) 10 MG tablet Take 1 tablet (10 mg total) by mouth every 8 (eight) hours as needed (Agitation).    OPTICHAMBER BIGG LG MASK Spcr Inhale into the lungs.    pantoprazole (PROTONIX) 40 MG tablet Take 1 tablet (40 mg total) by mouth once daily.    polyvinyl alcohol, artificial tears, (LIQUIFILM TEARS) 1.4 % ophthalmic solution Place 1 drop into both eyes 4 (four) times daily.    pravastatin (PRAVACHOL) 40 MG tablet TAKE 1 TABLET(40 MG) BY MOUTH EVERY EVENING    risperiDONE (RISPERDAL M-TABS) 3 MG disintegrating tablet Take 1 tablet (3 mg total) by mouth 2 (two) times daily.    senna (SENOKOT) 8.6 mg tablet Take 1 tablet by mouth 2 (two) times a day.    blood sugar diagnostic Strp To check BG two  times daily, to use with insurance preferred meter (Patient taking differently: To check BG two  times daily, to use with insurance preferred meter)    blood-glucose meter kit To check BG once daily, to  "use with insurance preferred meter    blood-glucose meter kit To check BG two times daily, to use with insurance preferred meter    fluticasone-salmeterol diskus inhaler 250-50 mcg Inhale 1 puff into the lungs 2 (two) times daily. Controller    hydrOXYzine pamoate (VISTARIL) 50 MG Cap Take 1 capsule (50 mg total) by mouth 3 (three) times daily.    lancets Misc To check BG two times daily, to use with insurance preferred meter    multivitamin Tab Take 1 tablet by mouth once daily.    TRUE METRIX GLUCOSE METER Misc CHECK BLOOD SUGAR TWICE DAILY     Antibiotics (From admission, onward)                Start     Stop Route Frequency Ordered    05/05/22 1415  ciprofloxacin (CIPRO)400mg/200ml D5W IVPB 400 mg         -- IV Every 12 hours (non-standard times) 05/05/22 1311    05/05/22 1415  metronidazole IVPB 500 mg         -- IV Every 8 hours (non-standard times) 05/05/22 1311    05/04/22 2230  vancomycin (VANCOCIN) 1,750 mg in dextrose 5 % 500 mL IVPB         -- IV Every 12 hours (non-standard times) 05/04/22 1148    05/04/22 1245  mupirocin 2 % ointment         05/09 0859 Nasl 2 times daily 05/04/22 1135    05/04/22 1103  vancomycin - pharmacy to dose  (vancomycin IVPB)        "And" Linked Group Details    -- IV pharmacy to manage frequency 05/04/22 1004          Antifungals (From admission, onward)                None          Antivirals (From admission, onward)      None             Immunization History   Administered Date(s) Administered    Hepatitis B, Adult 01/09/2014, 02/13/2014    Influenza 01/14/2015    Influenza - Quadrivalent 09/18/2015    Influenza - Quadrivalent - PF *Preferred* (6 months and older) 11/18/2016, 10/24/2017, 10/31/2018, 09/04/2019, 09/29/2020, 02/03/2022    Influenza - Trivalent - PF (ADULT) 01/14/2015    Pneumococcal Polysaccharide - 23 Valent 10/24/2017    Tdap 01/09/2014       Family History       Problem Relation (Age of Onset)    Cataracts Father    Diabetes Father, Paternal " Grandfather    Heart disease Father, Paternal Grandfather    Hypertension Father    No Known Problems Mother, Sister, Brother, Maternal Aunt, Maternal Uncle, Paternal Aunt, Paternal Uncle, Maternal Grandfather    Ovarian cancer Maternal Grandmother, Paternal Grandmother          Social History     Socioeconomic History    Marital status: Significant Other   Tobacco Use    Smoking status: Current Every Day Smoker     Packs/day: 1.00     Years: 37.00     Pack years: 37.00     Types: Cigarettes     Last attempt to quit: 2020     Years since quittin.4    Smokeless tobacco: Never Used    Tobacco comment: Enrolled in the [employer] on 5/3/14 ([facility] Member ID # [id number]). Ambulatory referral to Smoking Cessation Program   Substance and Sexual Activity    Alcohol use: No     Alcohol/week: 0.0 standard drinks    Drug use: No    Sexual activity: Yes     Partners: Male   Social History Narrative     from her  of 20 years    Lives by herself since      Review of Systems   Constitutional:  Negative for chills and fever.   Skin:  Positive for wound.   Psychiatric/Behavioral:  The patient is nervous/anxious.    All other systems reviewed and are negative.  Objective:     Vital Signs (Most Recent):  Temp: 98.9 °F (37.2 °C) (22 0836)  Pulse: 75 (22 0850)  Resp: 16 (22 1035)  BP: (!) 141/65 (22 0836)  SpO2: 95 % (22 0850)   Vital Signs (24h Range):  Temp:  [97.3 °F (36.3 °C)-99.7 °F (37.6 °C)] 98.9 °F (37.2 °C)  Pulse:  [67-84] 75  Resp:  [16-20] 16  SpO2:  [93 %-97 %] 95 %  BP: (109-177)/(51-77) 141/65     Weight: 118.5 kg (261 lb 3.9 oz)  Body mass index is 42.17 kg/m².    Estimated Creatinine Clearance: 127.4 mL/min (based on SCr of 0.7 mg/dL).    Physical Exam  Vitals and nursing note reviewed.   Constitutional:       General: She is not in acute distress.     Appearance: Normal appearance. She is not ill-appearing, toxic-appearing or diaphoretic.   HENT:       Head: Normocephalic and atraumatic.      Right Ear: External ear normal.      Left Ear: External ear normal.   Eyes:      Extraocular Movements: Extraocular movements intact.      Pupils: Pupils are equal, round, and reactive to light.   Abdominal:      Tenderness: There is no guarding or rebound.   Musculoskeletal:         General: Swelling and deformity present.   Neurological:      General: No focal deficit present.      Mental Status: She is alert.   Psychiatric:         Mood and Affect: Mood normal.         Behavior: Behavior normal.       Significant Labs: Blood Culture:   Recent Labs   Lab 04/13/22  0309 04/18/22  1946 05/04/22  0944 05/04/22  0947   LABBLOO No growth after 5 days.  No growth after 5 days. No growth after 5 days.  No growth after 5 days. Gram stain abbe bottle: Gram positive cocci in clusters resembling Staph   Results called to and read back by: Linnette KRAMER 05/05/2022    09:58 No Growth to date  No Growth to date     CBC:   Recent Labs   Lab 05/05/22  0555 05/05/22  1510 05/06/22  0536   WBC 19.24*  --  14.00*   HGB 15.5 6.8* 7.3*   HCT 46.3  --  22.0*   PLT SEE COMMENT  --  601*     CMP:   Recent Labs   Lab 05/05/22  1511 05/05/22  2138 05/06/22  0536   * 117* 119*   K 5.2* 5.2* 5.7*   CL 86* 87* 88*   CO2 22* 25 26   * 150* 110   BUN 14 14 13   CREATININE 0.7 0.7 0.7   CALCIUM 7.6* 7.7* 7.6*   ANIONGAP 8 5* 5*   EGFRNONAA >60 >60 >60     Wound Culture:   Recent Labs   Lab 04/13/22  1234 05/04/22  1500   LABAERO METHICILLIN RESISTANT STAPHYLOCOCCUS AUREUS  Many  Skin rosenda also present  * Results called to and read back by: Linnette Alvarado 05/06/2022  08:40  METHICILLIN RESISTANT STAPHYLOCOCCUS AUREUS  Many  *  GRAM NEGATIVE PEEWEE  >100,000 cfu/ml  Identification and susceptibility pending  *  GRAM NEGATIVE PEEWEE  Moderate  Identification and susceptibility pending  *  STAPHYLOCOCCUS AUREUS  Many  Susceptibility pending  *       Significant Imaging: I have  reviewed all pertinent imaging results/findings within the past 24 hours.

## 2022-05-06 NOTE — NURSING
Bedside shift report received from DANK Frances.  Patient resting in bed.  Patient denies any needs or wants at this time.  Call light within reach.  Will continue to monitor.
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
## 2022-05-06 NOTE — ASSESSMENT & PLAN NOTE
- left foot abscess and osteomyelitis, right foot osteomyelitis  - polymicrobial: MRSA (left and right), GNB (left), likely anaerobes  - f/u MRI  - anticipating debridement/ampution of left foot for source control  - f/u blood cultures and wound cultures  - will adjust abx based on final culture results as well as patient's clinical response to therapy

## 2022-05-07 LAB
ANION GAP SERPL CALC-SCNC: 7 MMOL/L (ref 8–16)
BACTERIA BLD CULT: ABNORMAL
BASOPHILS # BLD AUTO: 0.06 K/UL (ref 0–0.2)
BASOPHILS NFR BLD: 0.5 % (ref 0–1.9)
BUN SERPL-MCNC: 8 MG/DL (ref 6–20)
BUN SERPL-MCNC: 9 MG/DL (ref 6–20)
BUN SERPL-MCNC: 9 MG/DL (ref 6–20)
CALCIUM SERPL-MCNC: 7.9 MG/DL (ref 8.7–10.5)
CALCIUM SERPL-MCNC: 8 MG/DL (ref 8.7–10.5)
CALCIUM SERPL-MCNC: 8.3 MG/DL (ref 8.7–10.5)
CHLORIDE SERPL-SCNC: 90 MMOL/L (ref 95–110)
CHLORIDE SERPL-SCNC: 91 MMOL/L (ref 95–110)
CHLORIDE SERPL-SCNC: 96 MMOL/L (ref 95–110)
CO2 SERPL-SCNC: 24 MMOL/L (ref 23–29)
CO2 SERPL-SCNC: 25 MMOL/L (ref 23–29)
CO2 SERPL-SCNC: 25 MMOL/L (ref 23–29)
CREAT SERPL-MCNC: 0.7 MG/DL (ref 0.5–1.4)
CREAT SERPL-MCNC: 0.7 MG/DL (ref 0.5–1.4)
CREAT SERPL-MCNC: 0.8 MG/DL (ref 0.5–1.4)
DIFFERENTIAL METHOD: ABNORMAL
EOSINOPHIL # BLD AUTO: 0.3 K/UL (ref 0–0.5)
EOSINOPHIL NFR BLD: 2.2 % (ref 0–8)
ERYTHROCYTE [DISTWIDTH] IN BLOOD BY AUTOMATED COUNT: 16.6 % (ref 11.5–14.5)
EST. GFR  (AFRICAN AMERICAN): >60 ML/MIN/1.73 M^2
EST. GFR  (NON AFRICAN AMERICAN): >60 ML/MIN/1.73 M^2
GLUCOSE SERPL-MCNC: 128 MG/DL (ref 70–110)
GLUCOSE SERPL-MCNC: 173 MG/DL (ref 70–110)
GLUCOSE SERPL-MCNC: 190 MG/DL (ref 70–110)
HCT VFR BLD AUTO: 23 % (ref 37–48.5)
HGB BLD-MCNC: 7.5 G/DL (ref 12–16)
IMM GRANULOCYTES # BLD AUTO: 0.46 K/UL (ref 0–0.04)
IMM GRANULOCYTES NFR BLD AUTO: 3.9 % (ref 0–0.5)
LYMPHOCYTES # BLD AUTO: 2.1 K/UL (ref 1–4.8)
LYMPHOCYTES NFR BLD: 18.1 % (ref 18–48)
MAGNESIUM SERPL-MCNC: 1.7 MG/DL (ref 1.6–2.6)
MCH RBC QN AUTO: 27.2 PG (ref 27–31)
MCHC RBC AUTO-ENTMCNC: 32.6 G/DL (ref 32–36)
MCV RBC AUTO: 83 FL (ref 82–98)
MONOCYTES # BLD AUTO: 1.6 K/UL (ref 0.3–1)
MONOCYTES NFR BLD: 13.7 % (ref 4–15)
NEUTROPHILS # BLD AUTO: 7.3 K/UL (ref 1.8–7.7)
NEUTROPHILS NFR BLD: 61.6 % (ref 38–73)
NRBC BLD-RTO: 0 /100 WBC
PLATELET # BLD AUTO: 653 K/UL (ref 150–450)
PMV BLD AUTO: 9.4 FL (ref 9.2–12.9)
POCT GLUCOSE: 142 MG/DL (ref 70–110)
POCT GLUCOSE: 161 MG/DL (ref 70–110)
POCT GLUCOSE: 184 MG/DL (ref 70–110)
POCT GLUCOSE: 186 MG/DL (ref 70–110)
POTASSIUM SERPL-SCNC: 4.9 MMOL/L (ref 3.5–5.1)
POTASSIUM SERPL-SCNC: 5.2 MMOL/L (ref 3.5–5.1)
POTASSIUM SERPL-SCNC: 5.3 MMOL/L (ref 3.5–5.1)
RBC # BLD AUTO: 2.76 M/UL (ref 4–5.4)
SODIUM SERPL-SCNC: 121 MMOL/L (ref 136–145)
SODIUM SERPL-SCNC: 123 MMOL/L (ref 136–145)
SODIUM SERPL-SCNC: 128 MMOL/L (ref 136–145)
WBC # BLD AUTO: 11.79 K/UL (ref 3.9–12.7)

## 2022-05-07 PROCEDURE — 63600175 PHARM REV CODE 636 W HCPCS: Performed by: EMERGENCY MEDICINE

## 2022-05-07 PROCEDURE — 25000003 PHARM REV CODE 250: Performed by: HOSPITALIST

## 2022-05-07 PROCEDURE — 25000003 PHARM REV CODE 250: Performed by: INTERNAL MEDICINE

## 2022-05-07 PROCEDURE — 94760 N-INVAS EAR/PLS OXIMETRY 1: CPT

## 2022-05-07 PROCEDURE — 99233 PR SUBSEQUENT HOSPITAL CARE,LEVL III: ICD-10-PCS | Mod: ,,, | Performed by: INTERNAL MEDICINE

## 2022-05-07 PROCEDURE — 80048 BASIC METABOLIC PNL TOTAL CA: CPT | Performed by: STUDENT IN AN ORGANIZED HEALTH CARE EDUCATION/TRAINING PROGRAM

## 2022-05-07 PROCEDURE — 36415 COLL VENOUS BLD VENIPUNCTURE: CPT | Performed by: STUDENT IN AN ORGANIZED HEALTH CARE EDUCATION/TRAINING PROGRAM

## 2022-05-07 PROCEDURE — S0030 INJECTION, METRONIDAZOLE: HCPCS | Performed by: STUDENT IN AN ORGANIZED HEALTH CARE EDUCATION/TRAINING PROGRAM

## 2022-05-07 PROCEDURE — 80048 BASIC METABOLIC PNL TOTAL CA: CPT | Mod: 91 | Performed by: STUDENT IN AN ORGANIZED HEALTH CARE EDUCATION/TRAINING PROGRAM

## 2022-05-07 PROCEDURE — 25000003 PHARM REV CODE 250: Performed by: EMERGENCY MEDICINE

## 2022-05-07 PROCEDURE — 80048 BASIC METABOLIC PNL TOTAL CA: CPT | Mod: 91 | Performed by: INTERNAL MEDICINE

## 2022-05-07 PROCEDURE — 25000003 PHARM REV CODE 250: Performed by: STUDENT IN AN ORGANIZED HEALTH CARE EDUCATION/TRAINING PROGRAM

## 2022-05-07 PROCEDURE — 85025 COMPLETE CBC W/AUTO DIFF WBC: CPT | Performed by: STUDENT IN AN ORGANIZED HEALTH CARE EDUCATION/TRAINING PROGRAM

## 2022-05-07 PROCEDURE — 63600175 PHARM REV CODE 636 W HCPCS: Performed by: STUDENT IN AN ORGANIZED HEALTH CARE EDUCATION/TRAINING PROGRAM

## 2022-05-07 PROCEDURE — 94640 AIRWAY INHALATION TREATMENT: CPT

## 2022-05-07 PROCEDURE — 27000207 HC ISOLATION

## 2022-05-07 PROCEDURE — 21400001 HC TELEMETRY ROOM

## 2022-05-07 PROCEDURE — 99233 SBSQ HOSP IP/OBS HIGH 50: CPT | Mod: ,,, | Performed by: INTERNAL MEDICINE

## 2022-05-07 PROCEDURE — 83735 ASSAY OF MAGNESIUM: CPT | Performed by: HOSPITALIST

## 2022-05-07 PROCEDURE — 25000003 PHARM REV CODE 250: Performed by: REGISTERED NURSE

## 2022-05-07 RX ORDER — SODIUM CHLORIDE 9 MG/ML
INJECTION, SOLUTION INTRAVENOUS CONTINUOUS
Status: DISCONTINUED | OUTPATIENT
Start: 2022-05-07 | End: 2022-05-08

## 2022-05-07 RX ORDER — CIPROFLOXACIN 500 MG/1
500 TABLET ORAL EVERY 12 HOURS
Status: DISCONTINUED | OUTPATIENT
Start: 2022-05-07 | End: 2022-05-10

## 2022-05-07 RX ORDER — PANTOPRAZOLE SODIUM 40 MG/1
40 TABLET, DELAYED RELEASE ORAL DAILY
Status: DISCONTINUED | OUTPATIENT
Start: 2022-05-07 | End: 2022-05-13 | Stop reason: HOSPADM

## 2022-05-07 RX ADMIN — SODIUM CHLORIDE: 0.9 INJECTION, SOLUTION INTRAVENOUS at 03:05

## 2022-05-07 RX ADMIN — ASPIRIN 81 MG CHEWABLE TABLET 81 MG: 81 TABLET CHEWABLE at 09:05

## 2022-05-07 RX ADMIN — APIXABAN 5 MG: 5 TABLET, FILM COATED ORAL at 01:05

## 2022-05-07 RX ADMIN — RISPERIDONE 3 MG: 1 TABLET ORAL at 08:05

## 2022-05-07 RX ADMIN — METRONIDAZOLE 500 MG: 500 INJECTION, SOLUTION INTRAVENOUS at 10:05

## 2022-05-07 RX ADMIN — RISPERIDONE 2 MG: 1 TABLET ORAL at 09:05

## 2022-05-07 RX ADMIN — CIPROFLOXACIN 500 MG: 500 TABLET, FILM COATED ORAL at 06:05

## 2022-05-07 RX ADMIN — METRONIDAZOLE 500 MG: 500 INJECTION, SOLUTION INTRAVENOUS at 05:05

## 2022-05-07 RX ADMIN — MUPIROCIN: 20 OINTMENT TOPICAL at 08:05

## 2022-05-07 RX ADMIN — DIVALPROEX SODIUM 500 MG: 250 TABLET, DELAYED RELEASE ORAL at 09:05

## 2022-05-07 RX ADMIN — METRONIDAZOLE 500 MG: 500 INJECTION, SOLUTION INTRAVENOUS at 03:05

## 2022-05-07 RX ADMIN — DIVALPROEX SODIUM 1250 MG: 250 TABLET, DELAYED RELEASE ORAL at 08:05

## 2022-05-07 RX ADMIN — PRAVASTATIN SODIUM 40 MG: 40 TABLET ORAL at 08:05

## 2022-05-07 RX ADMIN — HYDROXYZINE PAMOATE 50 MG: 25 CAPSULE ORAL at 03:05

## 2022-05-07 RX ADMIN — OXYCODONE AND ACETAMINOPHEN 1 TABLET: 7.5; 325 TABLET ORAL at 08:05

## 2022-05-07 RX ADMIN — METOPROLOL TARTRATE 50 MG: 50 TABLET, FILM COATED ORAL at 09:05

## 2022-05-07 RX ADMIN — FLUTICASONE FUROATE AND VILANTEROL TRIFENATATE 1 PUFF: 100; 25 POWDER RESPIRATORY (INHALATION) at 08:05

## 2022-05-07 RX ADMIN — ACETAMINOPHEN 1000 MG: 500 TABLET ORAL at 03:05

## 2022-05-07 RX ADMIN — PANTOPRAZOLE SODIUM 40 MG: 40 TABLET, DELAYED RELEASE ORAL at 09:05

## 2022-05-07 RX ADMIN — HYDROXYZINE PAMOATE 50 MG: 25 CAPSULE ORAL at 09:05

## 2022-05-07 RX ADMIN — VANCOMYCIN HYDROCHLORIDE 1750 MG: 10 INJECTION, POWDER, LYOPHILIZED, FOR SOLUTION INTRAVENOUS at 01:05

## 2022-05-07 RX ADMIN — HYDROXYZINE PAMOATE 50 MG: 25 CAPSULE ORAL at 08:05

## 2022-05-07 RX ADMIN — APIXABAN 5 MG: 5 TABLET, FILM COATED ORAL at 08:05

## 2022-05-07 RX ADMIN — METOPROLOL TARTRATE 50 MG: 50 TABLET, FILM COATED ORAL at 08:05

## 2022-05-07 RX ADMIN — DOCUSATE SODIUM 100 MG: 100 CAPSULE, LIQUID FILLED ORAL at 09:05

## 2022-05-07 RX ADMIN — CIPROFLOXACIN 400 MG: 2 INJECTION, SOLUTION INTRAVENOUS at 02:05

## 2022-05-07 RX ADMIN — VANCOMYCIN HYDROCHLORIDE 1750 MG: 10 INJECTION, POWDER, LYOPHILIZED, FOR SOLUTION INTRAVENOUS at 10:05

## 2022-05-07 RX ADMIN — OXYCODONE AND ACETAMINOPHEN 1 TABLET: 7.5; 325 TABLET ORAL at 09:05

## 2022-05-07 NOTE — PROGRESS NOTES
Pharmacokinetic Assessment Follow Up: IV Vancomycin    Vancomycin serum concentration assessment(s):    The trough level was drawn correctly and can be used to guide therapy at this time. The measurement is within the desired definitive target range of 10 to 20 mcg/mL.    Vancomycin Regimen Plan:    Continue regimen to Vancomycin 1750 mg IV every 12 hours with next serum trough concentration measured at 21:30 prior to fourth dose on 5/8/22    Drug levels (last 3 results):  Recent Labs   Lab Result Units 05/05/22  2138 05/06/22  2152   Vancomycin-Trough ug/mL 8.4* 16.4       Pharmacy will continue to follow and monitor vancomycin.    Please contact pharmacy at extension [phone] for questions regarding this assessment.    Thank you for the consult,   Maria E Lozano       Patient brief summary:  Audrey Natarajan is a 51 y.o. female initiated on antimicrobial therapy with IV Vancomycin for treatment of skin & soft tissue infection    The patient's current regimen is Vancomycin 1750 mg IV q12h    Drug Allergies:   Review of patient's allergies indicates:   Allergen Reactions    Morphine Other (See Comments)     Patient had a psychotic episode after taking Morphine  Agitation, hallucinations    Penicillins Anaphylaxis     itching    Januvia [sitagliptin] Hives    Carbamazepine Other (See Comments)     hyponatremia       Actual Body Weight:   118.5 kg    Renal Function:   Estimated Creatinine Clearance: 127.4 mL/min (based on SCr of 0.7 mg/dL).,     Dialysis Method (if applicable):  N/A    CBC (last 72 hours):  Recent Labs   Lab Result Units 05/04/22  0944 05/05/22  0555 05/05/22  1510 05/06/22  0536   WBC K/uL 20.54* 19.24*  --  14.00*   Hemoglobin g/dL 7.3* 15.5 6.8* 7.3*   Hematocrit % 21.3* 46.3  --  22.0*   Platelets K/uL 628* SEE COMMENT  --  601*   Gran % % 74.1* 72.4  --  76.7*   Lymph % % 9.6* 12.1*  --  10.1*   Mono % % 15.1* 11.2  --  10.1   Eosinophil % % 0.0 1.8  --  0.9   Basophil % % 0.2 0.5  --  0.2    Differential Method  Automated Automated  --  Automated       Metabolic Panel (last 72 hours):  Recent Labs   Lab Result Units 05/04/22  0944 05/04/22  1814 05/04/22 2028 05/04/22  2334 05/05/22  0555 05/05/22  0830 05/05/22  1511 05/05/22  2138 05/06/22  0536 05/06/22  1155 05/06/22  1732 05/07/22  0000   Sodium mmol/L 118* 116*  --  118* 113* 116* 116* 117* 119* 118* 121* 121*   Sodium, Urine mmol/L  --   --  <20*  --   --   --   --   --   --   --   --   --    Potassium mmol/L 5.0 5.1  --  5.7* 5.7* 5.4* 5.2* 5.2* 5.7* 5.0 5.1 5.2*   Chloride mmol/L 85* 86*  --  86* 85* 85* 86* 87* 88* 89* 90* 90*   CO2 mmol/L 25 24  --  22* 21* 24 22* 25 26 25 25 24   Glucose mg/dL 125* 147*  --  114* 90 92 136* 150* 110 138* 115* 190*   Glucose, UA   --   --  Negative  --   --   --   --   --   --   --   --   --    BUN mg/dL 12 12  --  13 13 13 14 14 13 11 9 9   Creatinine mg/dL 0.6 0.7  --  0.7 0.7 0.7 0.7 0.7 0.7 0.6 0.6 0.7   Albumin g/dL 2.1*  --   --   --   --   --   --   --   --   --   --   --    Total Bilirubin mg/dL 0.3  --   --   --   --   --   --   --   --   --   --   --    Alkaline Phosphatase U/L 136*  --   --   --   --   --   --   --   --   --   --   --    AST U/L 18  --   --   --   --   --   --   --   --   --   --   --    ALT U/L 14  --   --   --   --   --   --   --   --   --   --   --    Magnesium mg/dL  --   --   --   --  1.5*  --   --   --  1.8  --   --   --    Phosphorus mg/dL  --   --   --   --  3.3  --   --   --  3.0  --   --   --        Vancomycin Administrations:  vancomycin given in the last 96 hours                     vancomycin (VANCOCIN) 1,750 mg in dextrose 5 % 500 mL IVPB (mg) 1,750 mg New Bag 05/06/22 2242     1,750 mg New Bag  1038     1,750 mg New Bag 05/05/22 2235     1,750 mg New Bag 05/04/22 2235    vancomycin (VANCOCIN) 2,000 mg in dextrose 5 % 500 mL IVPB (mg) 2,000 mg New Bag 05/04/22 1026                    Microbiologic Results:  Microbiology Results (last 7 days)       Procedure  Component Value Units Date/Time    Blood culture [[id number]] Collected: 05/06/22 1155    Order Status: Completed Specimen: Blood from Antecubital, Right Hand Updated: 05/06/22 1912     Blood Culture, Routine No Growth to date    Blood culture [[id number]] Collected: 05/06/22 1155    Order Status: Completed Specimen: Blood from Peripheral, Left Hand Updated: 05/06/22 1912     Blood Culture, Routine No Growth to date    Gram stain [[id number]] Collected: 05/06/22 1249    Order Status: Completed Specimen: Bone from Toe, Right Foot Updated: 05/06/22 1446     Gram Stain Result No organisms seen    Aerobic culture [[id number]] Collected: 05/06/22 1249    Order Status: Sent Specimen: Bone from Toe, Right Foot Updated: 05/06/22 1302    AFB Culture & Smear [[id number]] Collected: 05/06/22 1249    Order Status: Sent Specimen: Bone from Toe, Right Foot Updated: 05/06/22 1302    Fungus culture [[id number]] Collected: 05/06/22 1249    Order Status: Sent Specimen: Bone from Toe, Right Foot Updated: 05/06/22 1302    Aerobic culture [[id number]]  (Abnormal)  (Susceptibility) Collected: 05/04/22 1500    Order Status: Completed Specimen: Wound from Foot, Right Updated: 05/06/22 1126     Aerobic Bacterial Culture Results called to and read back by: Linnette Alvarado 05/06/2022  08:40      ESCHERICHIA COLI  Many        ENTEROBACTER CLOACAE  Moderate        METHICILLIN RESISTANT STAPHYLOCOCCUS AUREUS  Many      Blood culture x two cultures. Draw prior to antibiotics. [[id number]] Collected: 05/04/22 0947    Order Status: Completed Specimen: Blood from Peripheral, Antecubital, Left Updated: 05/06/22 1103     Blood Culture, Routine No Growth to date      No Growth to date      No Growth to date    Narrative:      Aerobic and anaerobic    Blood culture x two cultures. Draw prior to antibiotics. [[id number]]  (Abnormal) Collected: 05/04/22 0944    Order Status: Completed Specimen: Blood from Peripheral, Antecubital, Right Updated: 05/06/22 1101      Blood Culture, Routine Gram stain abbe bottle: Gram positive cocci in clusters resembling Staph       Results called to and read back by: Linnette KRAMER 05/05/2022        09:58      STAPHYLOCOCCUS AUREUS  Many  Susceptibility pending      Narrative:      Aerobic and anaerobic    Aerobic culture [[id number]]  (Abnormal)  (Susceptibility) Collected: 05/04/22 1500    Order Status: Completed Specimen: Wound from Foot, Left Updated: 05/06/22 0844     Aerobic Bacterial Culture Results called to and read back by: Linnette Alvarado 05/06/2022  08:40      METHICILLIN RESISTANT STAPHYLOCOCCUS AUREUS  Many
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
## 2022-05-07 NOTE — PLAN OF CARE
C/o pain once on last night no fever, chills or abd pain noted. Wound care completed at 0530am this am. Next dressing change this afternoon at 1730pm.

## 2022-05-07 NOTE — ASSESSMENT & PLAN NOTE
"This patient does have evidence of infective focus- bilateral DM foot wounds and bacteremia  My overall impression is sepsis. Vital signs were reviewed and noted in progress note.  Antibiotics given-   Antibiotics (From admission, onward)            Start     Stop Route Frequency Ordered    05/05/22 1415  ciprofloxacin (CIPRO)400mg/200ml D5W IVPB 400 mg         -- IV Every 12 hours (non-standard times) 05/05/22 1311    05/05/22 1415  metronidazole IVPB 500 mg         -- IV Every 8 hours (non-standard times) 05/05/22 1311    05/04/22 2230  vancomycin (VANCOCIN) 1,750 mg in dextrose 5 % 500 mL IVPB         -- IV Every 12 hours (non-standard times) 05/04/22 1148    05/04/22 1245  mupirocin 2 % ointment         05/09 0859 Nasl 2 times daily 05/04/22 1135    05/04/22 1103  vancomycin - pharmacy to dose  (vancomycin IVPB)        "And" Linked Group Details    -- IV pharmacy to manage frequency 05/04/22 1004        Cultures were taken-   Microbiology Results (last 7 days)     Procedure Component Value Units Date/Time    Blood culture x two cultures. Draw prior to antibiotics. [[id number]] Collected: 05/04/22 0947    Order Status: Completed Specimen: Blood from Peripheral, Antecubital, Left Updated: 05/07/22 1103     Blood Culture, Routine No Growth to date      No Growth to date      No Growth to date      No Growth to date    Narrative:      Aerobic and anaerobic    Aerobic culture [[id number]] Collected: 05/06/22 1249    Order Status: Completed Specimen: Bone from Toe, Right Foot Updated: 05/07/22 0941     Aerobic Bacterial Culture No growth    Blood culture x two cultures. Draw prior to antibiotics. [[id number]]  (Abnormal)  (Susceptibility) Collected: 05/04/22 0944    Order Status: Completed Specimen: Blood from Peripheral, Antecubital, Right Updated: 05/07/22 0809     Blood Culture, Routine Gram stain abbe bottle: Gram positive cocci in clusters resembling Staph       Results called to and read back by: Linnette Calix- " 3W 05/05/2022        09:58      METHICILLIN RESISTANT STAPHYLOCOCCUS AUREUS    Narrative:      Aerobic and anaerobic    Blood culture [[id number]] Collected: 05/06/22 1155    Order Status: Completed Specimen: Blood from Antecubital, Right Hand Updated: 05/06/22 1912     Blood Culture, Routine No Growth to date    Blood culture [[id number]] Collected: 05/06/22 1155    Order Status: Completed Specimen: Blood from Peripheral, Left Hand Updated: 05/06/22 1912     Blood Culture, Routine No Growth to date    Gram stain [[id number]] Collected: 05/06/22 1249    Order Status: Completed Specimen: Bone from Toe, Right Foot Updated: 05/06/22 1446     Gram Stain Result No organisms seen    AFB Culture & Smear [[id number]] Collected: 05/06/22 1249    Order Status: Sent Specimen: Bone from Toe, Right Foot Updated: 05/06/22 1302    Fungus culture [[id number]] Collected: 05/06/22 1249    Order Status: Sent Specimen: Bone from Toe, Right Foot Updated: 05/06/22 1302    Aerobic culture [[id number]]  (Abnormal)  (Susceptibility) Collected: 05/04/22 1500    Order Status: Completed Specimen: Wound from Foot, Right Updated: 05/06/22 1126     Aerobic Bacterial Culture Results called to and read back by: Linnette Alvarado 05/06/2022  08:40      ESCHERICHIA COLI  Many        ENTEROBACTER CLOACAE  Moderate        METHICILLIN RESISTANT STAPHYLOCOCCUS AUREUS  Many      Aerobic culture [[id number]]  (Abnormal)  (Susceptibility) Collected: 05/04/22 1500    Order Status: Completed Specimen: Wound from Foot, Left Updated: 05/06/22 0844     Aerobic Bacterial Culture Results called to and read back by: Linnette Alvarado 05/06/2022  08:40      METHICILLIN RESISTANT STAPHYLOCOCCUS AUREUS  Many          Latest lactate reviewed, they are-  Recent Labs   Lab 05/04/22  1144   LACTATE 0.8     Staph bacteremia- repeat blood cultures. TTE no vegetation.    MRI L foot: mid/forefoot cellulitis, Charcot changes cannot rule out septic arthritis  MRI R foot: cellulitis  forefoot and great toe. Osteomyelitis of 1st digit    Podiatry following. Declines amputation  Vascular consulted for abnormal arterial US  ID consulted- anticipate prolonged IV antibiotics

## 2022-05-07 NOTE — PLAN OF CARE
Problem: Adult Inpatient Plan of Care  Goal: Plan of Care Review  Outcome: Ongoing, Progressing  Goal: Patient-Specific Goal (Individualized)  Outcome: Ongoing, Progressing  Goal: Absence of Hospital-Acquired Illness or Injury  Outcome: Ongoing, Progressing  Goal: Optimal Comfort and Wellbeing  Outcome: Ongoing, Progressing  Goal: Readiness for Transition of Care  Outcome: Ongoing, Progressing     Problem: Skin Injury Risk Increased  Goal: Skin Health and Integrity  Outcome: Ongoing, Progressing     Problem: Fall Injury Risk  Goal: Absence of Fall and Fall-Related Injury  Outcome: Ongoing, Progressing     Problem: Diabetes Comorbidity  Goal: Blood Glucose Level Within Targeted Range  Outcome: Ongoing, Progressing     Problem: Fluid and Electrolyte Imbalance (Acute Kidney Injury/Impairment)  Goal: Fluid and Electrolyte Balance  Outcome: Ongoing, Progressing     Problem: Oral Intake Inadequate (Acute Kidney Injury/Impairment)  Goal: Optimal Nutrition Intake  Outcome: Ongoing, Progressing     Problem: Renal Function Impairment (Acute Kidney Injury/Impairment)  Goal: Effective Renal Function  Outcome: Ongoing, Progressing     Problem: Impaired Wound Healing  Goal: Optimal Wound Healing  Outcome: Ongoing, Progressing     Problem: Bariatric Environmental Safety  Goal: Safety Maintained with Care  Outcome: Ongoing, Progressing     Problem: Infection  Goal: Absence of Infection Signs and Symptoms  Outcome: Ongoing, Progressing

## 2022-05-07 NOTE — ASSESSMENT & PLAN NOTE
- left foot abscess and osteomyelitis, right foot osteomyelitis  - polymicrobial: MRSA (left and right), Enterobacter and E.coli (left), likely anaerobes  - s/p debridement (patient declined curative amputation)  - f/u final blood cultures and wound cultures  - anticipating 6 weeks of IV vancomycin abx  - prognosis is unclear to me and only time will tell

## 2022-05-07 NOTE — ASSESSMENT & PLAN NOTE
Hgb decreased. No bleeding noted. Required 1U RBC transfusion on 5/5 with inappropriate response  - CBC in AM  - TSAT low

## 2022-05-07 NOTE — ASSESSMENT & PLAN NOTE
For chronic DVT, last visualized 4/2022  Resume anticoagulation as no active bleeding has been identified

## 2022-05-07 NOTE — SUBJECTIVE & OBJECTIVE
Interval History: Feeling better. Sitting on commode. Debrided yesterday.    Review of Systems   Skin:  Positive for wound.   Psychiatric/Behavioral:  Positive for behavioral problems. The patient is nervous/anxious.    Objective:     Vital Signs (Most Recent):  Temp: 98.2 °F (36.8 °C) (05/07/22 0840)  Pulse: 92 (05/07/22 0840)  Resp: 18 (05/07/22 0922)  BP: (!) 175/75 (05/07/22 0840)  SpO2: 97 % (05/07/22 0840)   Vital Signs (24h Range):  Temp:  [97.7 °F (36.5 °C)-98.6 °F (37 °C)] 98.2 °F (36.8 °C)  Pulse:  [63-92] 92  Resp:  [16-18] 18  SpO2:  [97 %-99 %] 97 %  BP: (118-175)/(59-75) 175/75     Weight: 118.5 kg (261 lb 3.9 oz)  Body mass index is 42.17 kg/m².    Estimated Creatinine Clearance: 127.4 mL/min (based on SCr of 0.7 mg/dL).    Physical Exam  Constitutional:       Appearance: Normal appearance.   Eyes:      Extraocular Movements: Extraocular movements intact.      Conjunctiva/sclera: Conjunctivae normal.      Pupils: Pupils are equal, round, and reactive to light.   Musculoskeletal:         General: Deformity and signs of injury present.      Comments: Bilateral feet dressed   Neurological:      General: No focal deficit present.      Mental Status: She is alert and oriented to person, place, and time.   Psychiatric:         Mood and Affect: Mood normal.         Behavior: Behavior normal.       Significant Labs: Blood Culture:   Recent Labs   Lab 04/13/22  0309 04/18/22  1946 05/04/22  0944 05/04/22  0947 05/06/22  1155   LABBLOO No growth after 5 days.  No growth after 5 days. No growth after 5 days.  No growth after 5 days. Gram stain abbe bottle: Gram positive cocci in clusters resembling Staph   Results called to and read back by: Linnette KRAMER 05/05/2022    09:58  METHICILLIN RESISTANT STAPHYLOCOCCUS AUREUS* No Growth to date  No Growth to date  No Growth to date  No Growth to date No Growth to date  No Growth to date     CBC:   Recent Labs   Lab 05/05/22  1510 05/06/22  0586  05/07/22  0556   WBC  --  14.00* 11.79   HGB 6.8* 7.3* 7.5*   HCT  --  22.0* 23.0*   PLT  --  601* 653*     CMP:   Recent Labs   Lab 05/06/22  1732 05/07/22  0000 05/07/22  0556   * 121* 123*   K 5.1 5.2* 4.9   CL 90* 90* 91*   CO2 25 24 25   * 190* 128*   BUN 9 9 8   CREATININE 0.6 0.7 0.7   CALCIUM 8.4* 7.9* 8.0*   ANIONGAP 6* 7* 7*   EGFRNONAA >60 >60 >60     Wound Culture:   Recent Labs   Lab 04/13/22  1234 05/04/22  1500 05/06/22  1249   LABAERO METHICILLIN RESISTANT STAPHYLOCOCCUS AUREUS  Many  Skin rosenda also present  * Results called to and read back by: Linnette Alvarado 05/06/2022  08:40  METHICILLIN RESISTANT STAPHYLOCOCCUS AUREUS  Many  *  Results called to and read back by: Linnette Alvarado 05/06/2022  08:40  ESCHERICHIA COLI  Many  *  ENTEROBACTER CLOACAE  Moderate  *  METHICILLIN RESISTANT STAPHYLOCOCCUS AUREUS  Many  * No growth       Significant Imaging: I have reviewed all pertinent imaging results/findings within the past 24 hours.

## 2022-05-07 NOTE — PROGRESS NOTES
[facility] - Telemetry  Infectious Disease  Progress Note    Patient Name: Audrey Natarajan  MRN: [id number]  Admission Date: 5/4/2022  Length of Stay: 3 days  Attending Physician: Keyona Darden MD  Primary Care Provider: Donaldo Pena MD    Isolation Status: Contact     Assessment/Plan:      Diabetic foot ulcer associated with type 2 diabetes mellitus  - left foot abscess and osteomyelitis, right foot osteomyelitis  - polymicrobial: MRSA (left and right), Enterobacter and E.coli (left), likely anaerobes  - s/p debridement (patient declined curative amputation)  - f/u final blood cultures and wound cultures  - anticipating 6 weeks of IV vancomycin abx  - prognosis is unclear to me and only time will tell      Terry Pereira MD  Infectious Diseases      Subjective:     Principal Problem:Sepsis due to methicillin resistant Staphylococcus aureus (MRSA)    HPI: 50 y/o with psychiatric illness - previously under [facility], DM, DVT/PE history with IVC filter in place, s/p splenectomy; admitted with bilateral wounds to feet - left worse than right (left foot with charcot deformity) and recurrent DVTs both legs. The left foot culture collected 4/13 positive for MRSA and finegoldia magna. MRI without definitive evidence of osteo. Continued vanco and flagyl for 14 days total - if discharged would switch vanco to po doxy. Discharged from [facility] on 4/26.  Presented to podiatry clinic for evaluation and care of bilateral foot wounds.  Sent to ED and admitted with a worsening foot infection. She was started on vancomycin, Cipro, and Flagyl (anaphylaxis with PCN, by report). ID is consulted for diabetic foot infection and bacteremia. The patient is feeling better. Malodor to foot. No fever or chills.    Interval History: Feeling better. Sitting on commode. Debrided yesterday.    Review of Systems   Skin:  Positive for wound.   Psychiatric/Behavioral:  Positive for behavioral problems. The patient is nervous/anxious.     Objective:     Vital Signs (Most Recent):  Temp: 98.2 °F (36.8 °C) (05/07/22 0840)  Pulse: 92 (05/07/22 0840)  Resp: 18 (05/07/22 0922)  BP: (!) 175/75 (05/07/22 0840)  SpO2: 97 % (05/07/22 0840)   Vital Signs (24h Range):  Temp:  [97.7 °F (36.5 °C)-98.6 °F (37 °C)] 98.2 °F (36.8 °C)  Pulse:  [63-92] 92  Resp:  [16-18] 18  SpO2:  [97 %-99 %] 97 %  BP: (118-175)/(59-75) 175/75     Weight: 118.5 kg (261 lb 3.9 oz)  Body mass index is 42.17 kg/m².    Estimated Creatinine Clearance: 127.4 mL/min (based on SCr of 0.7 mg/dL).    Physical Exam  Constitutional:       Appearance: Normal appearance.   Eyes:      Extraocular Movements: Extraocular movements intact.      Conjunctiva/sclera: Conjunctivae normal.      Pupils: Pupils are equal, round, and reactive to light.   Musculoskeletal:         General: Deformity and signs of injury present.      Comments: Bilateral feet dressed   Neurological:      General: No focal deficit present.      Mental Status: She is alert and oriented to person, place, and time.   Psychiatric:         Mood and Affect: Mood normal.         Behavior: Behavior normal.       Significant Labs: Blood Culture:   Recent Labs   Lab 04/13/22  0309 04/18/22  1946 05/04/22  0944 05/04/22  0947 05/06/22  1155   LABBLOO No growth after 5 days.  No growth after 5 days. No growth after 5 days.  No growth after 5 days. Gram stain abbe bottle: Gram positive cocci in clusters resembling Staph   Results called to and read back by: Linnette Calix- NIA 05/05/2022    09:58  METHICILLIN RESISTANT STAPHYLOCOCCUS AUREUS* No Growth to date  No Growth to date  No Growth to date  No Growth to date No Growth to date  No Growth to date     CBC:   Recent Labs   Lab 05/05/22  1510 05/06/22  0536 05/07/22  0556   WBC  --  14.00* 11.79   HGB 6.8* 7.3* 7.5*   HCT  --  22.0* 23.0*   PLT  --  601* 653*     CMP:   Recent Labs   Lab 05/06/22  1732 05/07/22  0000 05/07/22  0556   * 121* 123*   K 5.1 5.2* 4.9   CL 90*  90* 91*   CO2 25 24 25   * 190* 128*   BUN 9 9 8   CREATININE 0.6 0.7 0.7   CALCIUM 8.4* 7.9* 8.0*   ANIONGAP 6* 7* 7*   EGFRNONAA >60 >60 >60     Wound Culture:   Recent Labs   Lab 04/13/22  1234 05/04/22  1500 05/06/22  1249   LABAERO METHICILLIN RESISTANT STAPHYLOCOCCUS AUREUS  Many  Skin rosenda also present  * Results called to and read back by: Linnette Alvarado 05/06/2022  08:40  METHICILLIN RESISTANT STAPHYLOCOCCUS AUREUS  Many  *  Results called to and read back by: Linnette Alvarado 05/06/2022  08:40  ESCHERICHIA COLI  Many  *  ENTEROBACTER CLOACAE  Moderate  *  METHICILLIN RESISTANT STAPHYLOCOCCUS AUREUS  Many  * No growth       Significant Imaging: I have reviewed all pertinent imaging results/findings within the past 24 hours.
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
## 2022-05-07 NOTE — PROGRESS NOTES
Renal Progress Note    Date of Admission:  5/4/2022  9:16 AM    Length of Stay: 3  Days    Subjective: no complaints    Objective:    Current Facility-Administered Medications   Medication    0.9%  NaCl infusion (for blood administration)    0.9%  NaCl infusion    acetaminophen tablet 1,000 mg    aspirin chewable tablet 81 mg    ciprofloxacin (CIPRO)400mg/200ml D5W IVPB 400 mg    dextrose 10% bolus 125 mL    dextrose 10% bolus 250 mL    divalproex EC tablet 1,250 mg    divalproex EC tablet 500 mg    docusate sodium capsule 100 mg    fluticasone furoate-vilanteroL 100-25 mcg/dose diskus inhaler 1 puff    glucagon (human recombinant) injection 1 mg    glucagon (human recombinant) injection 1 mg    glucose chewable tablet 16 g    glucose chewable tablet 24 g    hydrOXYzine pamoate capsule 50 mg    insulin aspart U-100 pen 0-5 Units    metoprolol tartrate (LOPRESSOR) tablet 50 mg    metronidazole IVPB 500 mg    mupirocin 2 % ointment    naloxone 0.4 mg/mL injection 0.02 mg    naloxone 0.4 mg/mL injection 0.02 mg    OLANZapine tablet 10 mg    oxyCODONE-acetaminophen 7.5-325 mg per tablet 1 tablet    pantoprazole EC tablet 40 mg    pravastatin tablet 40 mg    risperiDONE tablet 2 mg    risperiDONE tablet 3 mg    sodium chloride 0.9% flush 10 mL    sodium chloride 0.9% flush 10 mL    vancomycin (VANCOCIN) 1,750 mg in dextrose 5 % 500 mL IVPB    vancomycin - pharmacy to dose       Vitals:    05/07/22 0431 05/07/22 0805 05/07/22 0840 05/07/22 0922   BP: (!) 146/67  (!) 175/75    BP Location: Right arm      Patient Position: Sitting      Pulse: 69 71 92    Resp: 18 18 18 18   Temp: 98.3 °F (36.8 °C)  98.2 °F (36.8 °C)    TempSrc: Oral      SpO2: 97% 98% 97%    Weight:       Height:           I/O last 3 completed shifts:  In: 1890 [P.O.:1320; Blood:570]  Out: 835 [Urine:835]  I/O this shift:  In: 120 [P.O.:120]  Out: -       Physical Exam:     General: NAD  Neck:  supple  Heart: RRR  Lungs: unlabored breathing  Abdomen: n/a  Limbs: feet dressed  Neurologic: awake-talkative      Laboratories:    Recent Labs   Lab 05/07/22  0556   WBC 11.79   RBC 2.76*   HGB 7.5*   HCT 23.0*   *   MCV 83   MCH 27.2   MCHC 32.6       Recent Labs   Lab 05/07/22  0556   CALCIUM 8.0*   *   K 4.9   CO2 25   CL 91*   BUN 8   CREATININE 0.7       No results for input(s): COLORU, CLARITYU, SPECGRAV, PHUR, PROTEINUA, GLUCOSEU, BLOODU, WBCU, RBCU, BACTERIA, MUCUS in the last 24 hours.    Invalid input(s):  BILIRUBINCON    Microbiology Results (last 7 days)     Procedure Component Value Units Date/Time    Aerobic culture [[id number]] Collected: 05/06/22 1249    Order Status: Completed Specimen: Bone from Toe, Right Foot Updated: 05/07/22 0941     Aerobic Bacterial Culture No growth    Blood culture x two cultures. Draw prior to antibiotics. [[id number]]  (Abnormal)  (Susceptibility) Collected: 05/04/22 0944    Order Status: Completed Specimen: Blood from Peripheral, Antecubital, Right Updated: 05/07/22 0809     Blood Culture, Routine Gram stain abbe bottle: Gram positive cocci in clusters resembling Staph       Results called to and read back by: Linnette KRAMER 05/05/2022        09:58      METHICILLIN RESISTANT STAPHYLOCOCCUS AUREUS    Narrative:      Aerobic and anaerobic    Blood culture [[id number]] Collected: 05/06/22 1155    Order Status: Completed Specimen: Blood from Antecubital, Right Hand Updated: 05/06/22 1912     Blood Culture, Routine No Growth to date    Blood culture [[id number]] Collected: 05/06/22 1155    Order Status: Completed Specimen: Blood from Peripheral, Left Hand Updated: 05/06/22 1912     Blood Culture, Routine No Growth to date    Gram stain [[id number]] Collected: 05/06/22 1249    Order Status: Completed Specimen: Bone from Toe, Right Foot Updated: 05/06/22 1446     Gram Stain Result No organisms seen    AFB Culture & Smear [[id number]] Collected: 05/06/22 1249     Order Status: Sent Specimen: Bone from Toe, Right Foot Updated: 05/06/22 1302    Fungus culture [[id number]] Collected: 05/06/22 1249    Order Status: Sent Specimen: Bone from Toe, Right Foot Updated: 05/06/22 1302    Aerobic culture [[id number]]  (Abnormal)  (Susceptibility) Collected: 05/04/22 1500    Order Status: Completed Specimen: Wound from Foot, Right Updated: 05/06/22 1126     Aerobic Bacterial Culture Results called to and read back by: Linnette Alvarado 05/06/2022  08:40      ESCHERICHIA COLI  Many        ENTEROBACTER CLOACAE  Moderate        METHICILLIN RESISTANT STAPHYLOCOCCUS AUREUS  Many      Blood culture x two cultures. Draw prior to antibiotics. [[id number]] Collected: 05/04/22 0947    Order Status: Completed Specimen: Blood from Peripheral, Antecubital, Left Updated: 05/06/22 1103     Blood Culture, Routine No Growth to date      No Growth to date      No Growth to date    Narrative:      Aerobic and anaerobic    Aerobic culture [[id number]]  (Abnormal)  (Susceptibility) Collected: 05/04/22 1500    Order Status: Completed Specimen: Wound from Foot, Left Updated: 05/06/22 0844     Aerobic Bacterial Culture Results called to and read back by: Linnette Alvarado 05/06/2022  08:40      METHICILLIN RESISTANT STAPHYLOCOCCUS AUREUS  Many              Diagnostic Tests: n/a        Assessment:       50 y/o female with Bipolar disorder admitted with:     - Infected (polymicrobial) Bilateral diabetic foot ulcers, L-ulcer with necrotic base.  - Hyponatremia without excess of ADH and normal cortisol and TSH-T4  - Hyperkalemia RESOLVED  - Hypochloremia  IMPROVING  - HypoMg++ CORRECTED  - Hypoalbuminemia  - DM2  - Fe++ def. anemia        Plan:       - Free H2O restriction  - IV n.s. for now  - p.o. Na+ zirconium K+ binder PRN  - Replace Mg++ as needed  - Antib.  - Will follow serum sodium Q12h and keep Na+ correction at no more than 0.5 mEq/hour  - Wound care as per Podiatry  - Psychiatry following  - Anemia management,  Glycemic control and other problems per admitting

## 2022-05-07 NOTE — ASSESSMENT & PLAN NOTE
Long history of bipolar 1 disorder with recent psychosis at last hospital stay.  Will resume regimen Risperidone, Depakote.  Holding carbamazepine at the moment due to hyponatremia  - sister Crystal states patient has not been compliant with her medications and feels that she still has some paranoia that her stepdaughter had been hiding her medications though she believes it was the patient herself.  - Psych consulted- OK to continue current regimen minus carbamazepine  - VPA level on Monday (ordered)

## 2022-05-07 NOTE — ASSESSMENT & PLAN NOTE
A1c:   Lab Results   Component Value Date    HGBA1C 7.0 (H) 04/13/2022     Meds: SSI PRN to maintain goal 140-180  ADA diet, accuchecks, hypoglycemic protocol

## 2022-05-08 PROBLEM — K59.00 CONSTIPATION: Status: ACTIVE | Noted: 2022-05-08

## 2022-05-08 LAB
BACTERIA BLD CULT: NORMAL
MAGNESIUM SERPL-MCNC: 1.7 MG/DL (ref 1.6–2.6)
POCT GLUCOSE: 155 MG/DL (ref 70–110)
POCT GLUCOSE: 169 MG/DL (ref 70–110)
POCT GLUCOSE: 213 MG/DL (ref 70–110)
POCT GLUCOSE: 224 MG/DL (ref 70–110)
VANCOMYCIN TROUGH SERPL-MCNC: 19.6 UG/ML (ref 10–22)

## 2022-05-08 PROCEDURE — S0030 INJECTION, METRONIDAZOLE: HCPCS | Performed by: STUDENT IN AN ORGANIZED HEALTH CARE EDUCATION/TRAINING PROGRAM

## 2022-05-08 PROCEDURE — 63600175 PHARM REV CODE 636 W HCPCS: Performed by: EMERGENCY MEDICINE

## 2022-05-08 PROCEDURE — 25000003 PHARM REV CODE 250: Performed by: INTERNAL MEDICINE

## 2022-05-08 PROCEDURE — 25000242 PHARM REV CODE 250 ALT 637 W/ HCPCS: Performed by: HOSPITALIST

## 2022-05-08 PROCEDURE — 94640 AIRWAY INHALATION TREATMENT: CPT

## 2022-05-08 PROCEDURE — 99232 PR SUBSEQUENT HOSPITAL CARE,LEVL II: ICD-10-PCS | Mod: ,,, | Performed by: PODIATRIST

## 2022-05-08 PROCEDURE — 27000207 HC ISOLATION

## 2022-05-08 PROCEDURE — 83735 ASSAY OF MAGNESIUM: CPT | Performed by: HOSPITALIST

## 2022-05-08 PROCEDURE — 25000003 PHARM REV CODE 250: Performed by: REGISTERED NURSE

## 2022-05-08 PROCEDURE — 36415 COLL VENOUS BLD VENIPUNCTURE: CPT | Performed by: HOSPITALIST

## 2022-05-08 PROCEDURE — 21400001 HC TELEMETRY ROOM

## 2022-05-08 PROCEDURE — 63600175 PHARM REV CODE 636 W HCPCS: Performed by: STUDENT IN AN ORGANIZED HEALTH CARE EDUCATION/TRAINING PROGRAM

## 2022-05-08 PROCEDURE — 99232 SBSQ HOSP IP/OBS MODERATE 35: CPT | Mod: ,,, | Performed by: PODIATRIST

## 2022-05-08 PROCEDURE — 25000003 PHARM REV CODE 250: Performed by: HOSPITALIST

## 2022-05-08 PROCEDURE — 94760 N-INVAS EAR/PLS OXIMETRY 1: CPT

## 2022-05-08 PROCEDURE — 25000003 PHARM REV CODE 250: Performed by: EMERGENCY MEDICINE

## 2022-05-08 PROCEDURE — 80202 ASSAY OF VANCOMYCIN: CPT | Performed by: HOSPITALIST

## 2022-05-08 PROCEDURE — 25000003 PHARM REV CODE 250: Performed by: STUDENT IN AN ORGANIZED HEALTH CARE EDUCATION/TRAINING PROGRAM

## 2022-05-08 RX ORDER — FLUTICASONE PROPIONATE 50 MCG
2 SPRAY, SUSPENSION (ML) NASAL DAILY
Status: DISCONTINUED | OUTPATIENT
Start: 2022-05-08 | End: 2022-05-13 | Stop reason: HOSPADM

## 2022-05-08 RX ORDER — POLYETHYLENE GLYCOL 3350 17 G/17G
17 POWDER, FOR SOLUTION ORAL DAILY
Status: DISCONTINUED | OUTPATIENT
Start: 2022-05-08 | End: 2022-05-13 | Stop reason: HOSPADM

## 2022-05-08 RX ORDER — CETIRIZINE HYDROCHLORIDE 5 MG/1
5 TABLET ORAL DAILY
Status: DISCONTINUED | OUTPATIENT
Start: 2022-05-08 | End: 2022-05-13 | Stop reason: HOSPADM

## 2022-05-08 RX ORDER — AMOXICILLIN 250 MG
2 CAPSULE ORAL 2 TIMES DAILY
Status: DISCONTINUED | OUTPATIENT
Start: 2022-05-08 | End: 2022-05-13 | Stop reason: HOSPADM

## 2022-05-08 RX ADMIN — APIXABAN 5 MG: 5 TABLET, FILM COATED ORAL at 08:05

## 2022-05-08 RX ADMIN — DIVALPROEX SODIUM 500 MG: 250 TABLET, DELAYED RELEASE ORAL at 09:05

## 2022-05-08 RX ADMIN — SODIUM ZIRCONIUM CYCLOSILICATE 5 G: 5 POWDER, FOR SUSPENSION ORAL at 09:05

## 2022-05-08 RX ADMIN — PANTOPRAZOLE SODIUM 40 MG: 40 TABLET, DELAYED RELEASE ORAL at 09:05

## 2022-05-08 RX ADMIN — SODIUM CHLORIDE: 0.9 INJECTION, SOLUTION INTRAVENOUS at 05:05

## 2022-05-08 RX ADMIN — METRONIDAZOLE 500 MG: 500 INJECTION, SOLUTION INTRAVENOUS at 05:05

## 2022-05-08 RX ADMIN — HYDROXYZINE PAMOATE 50 MG: 25 CAPSULE ORAL at 04:05

## 2022-05-08 RX ADMIN — OXYCODONE AND ACETAMINOPHEN 1 TABLET: 7.5; 325 TABLET ORAL at 08:05

## 2022-05-08 RX ADMIN — CIPROFLOXACIN 500 MG: 500 TABLET, FILM COATED ORAL at 08:05

## 2022-05-08 RX ADMIN — PRAVASTATIN SODIUM 40 MG: 40 TABLET ORAL at 08:05

## 2022-05-08 RX ADMIN — DIVALPROEX SODIUM 1250 MG: 250 TABLET, DELAYED RELEASE ORAL at 08:05

## 2022-05-08 RX ADMIN — APIXABAN 5 MG: 5 TABLET, FILM COATED ORAL at 09:05

## 2022-05-08 RX ADMIN — SENNOSIDES AND DOCUSATE SODIUM 2 TABLET: 50; 8.6 TABLET ORAL at 11:05

## 2022-05-08 RX ADMIN — Medication 1 G: at 05:05

## 2022-05-08 RX ADMIN — ACETAMINOPHEN 1000 MG: 500 TABLET ORAL at 02:05

## 2022-05-08 RX ADMIN — CETIRIZINE HYDROCHLORIDE 5 MG: 5 TABLET ORAL at 11:05

## 2022-05-08 RX ADMIN — HYDROXYZINE PAMOATE 50 MG: 25 CAPSULE ORAL at 08:05

## 2022-05-08 RX ADMIN — METOPROLOL TARTRATE 50 MG: 50 TABLET, FILM COATED ORAL at 09:05

## 2022-05-08 RX ADMIN — FLUTICASONE PROPIONATE 100 MCG: 50 SPRAY, METERED NASAL at 12:05

## 2022-05-08 RX ADMIN — RISPERIDONE 3 MG: 1 TABLET ORAL at 08:05

## 2022-05-08 RX ADMIN — FLUTICASONE FUROATE AND VILANTEROL TRIFENATATE 1 PUFF: 100; 25 POWDER RESPIRATORY (INHALATION) at 08:05

## 2022-05-08 RX ADMIN — HYDROXYZINE PAMOATE 50 MG: 25 CAPSULE ORAL at 09:05

## 2022-05-08 RX ADMIN — ACETAMINOPHEN 1000 MG: 500 TABLET ORAL at 06:05

## 2022-05-08 RX ADMIN — MUPIROCIN: 20 OINTMENT TOPICAL at 08:05

## 2022-05-08 RX ADMIN — VANCOMYCIN HYDROCHLORIDE 1750 MG: 10 INJECTION, POWDER, LYOPHILIZED, FOR SOLUTION INTRAVENOUS at 10:05

## 2022-05-08 RX ADMIN — METRONIDAZOLE 500 MG: 500 INJECTION, SOLUTION INTRAVENOUS at 01:05

## 2022-05-08 RX ADMIN — Medication 1 G: at 08:05

## 2022-05-08 RX ADMIN — Medication 1 G: at 01:05

## 2022-05-08 RX ADMIN — METRONIDAZOLE 500 MG: 500 INJECTION, SOLUTION INTRAVENOUS at 09:05

## 2022-05-08 RX ADMIN — DOCUSATE SODIUM 100 MG: 100 CAPSULE, LIQUID FILLED ORAL at 09:05

## 2022-05-08 RX ADMIN — Medication 1 G: at 09:05

## 2022-05-08 RX ADMIN — CIPROFLOXACIN 500 MG: 500 TABLET, FILM COATED ORAL at 09:05

## 2022-05-08 RX ADMIN — POLYETHYLENE GLYCOL 3350 17 G: 17 POWDER, FOR SOLUTION ORAL at 11:05

## 2022-05-08 RX ADMIN — VANCOMYCIN HYDROCHLORIDE 1750 MG: 10 INJECTION, POWDER, LYOPHILIZED, FOR SOLUTION INTRAVENOUS at 11:05

## 2022-05-08 RX ADMIN — INSULIN ASPART 2 UNITS: 100 INJECTION, SOLUTION INTRAVENOUS; SUBCUTANEOUS at 05:05

## 2022-05-08 RX ADMIN — INSULIN ASPART 1 UNITS: 100 INJECTION, SOLUTION INTRAVENOUS; SUBCUTANEOUS at 08:05

## 2022-05-08 RX ADMIN — METOPROLOL TARTRATE 50 MG: 50 TABLET, FILM COATED ORAL at 08:05

## 2022-05-08 RX ADMIN — ASPIRIN 81 MG CHEWABLE TABLET 81 MG: 81 TABLET CHEWABLE at 09:05

## 2022-05-08 RX ADMIN — SENNOSIDES AND DOCUSATE SODIUM 2 TABLET: 50; 8.6 TABLET ORAL at 08:05

## 2022-05-08 RX ADMIN — OXYCODONE AND ACETAMINOPHEN 1 TABLET: 7.5; 325 TABLET ORAL at 09:05

## 2022-05-08 RX ADMIN — RISPERIDONE 2 MG: 1 TABLET ORAL at 09:05

## 2022-05-08 NOTE — SUBJECTIVE & OBJECTIVE
Interval History: Feeling better today. Walking around room without assistance when I arrived. Pain in feet improved. Still has leg swelling. She feels constipated. She is tearful when discussing her foot infections.     Review of Systems   Constitutional:  Negative for chills and fever.   Respiratory:  Negative for shortness of breath.    Cardiovascular:  Positive for leg swelling. Negative for chest pain.   Gastrointestinal:  Positive for constipation. Negative for abdominal pain, diarrhea, nausea and vomiting.   Genitourinary:  Negative for difficulty urinating.   Musculoskeletal:  Positive for back pain. Negative for arthralgias and myalgias.   Skin:  Positive for wound.   Neurological:  Positive for weakness and numbness.   Psychiatric/Behavioral:  Positive for dysphoric mood. Negative for confusion.    Objective:     Vital Signs (Most Recent):  Temp: 98.9 °F (37.2 °C) (05/08/22 0826)  Pulse: 78 (05/08/22 0826)  Resp: 18 (05/08/22 0939)  BP: 129/61 (05/08/22 0826)  SpO2: 97 % (05/08/22 0826) Vital Signs (24h Range):  Temp:  [98 °F (36.7 °C)-98.9 °F (37.2 °C)] 98.9 °F (37.2 °C)  Pulse:  [65-89] 78  Resp:  [16-19] 18  SpO2:  [97 %-99 %] 97 %  BP: (124-175)/(61-79) 129/61     Weight: 118.5 kg (261 lb 3.9 oz)  Body mass index is 42.17 kg/m².    Intake/Output Summary (Last 24 hours) at 5/8/2022 1004  Last data filed at 5/8/2022 0938  Gross per 24 hour   Intake 1480 ml   Output --   Net 1480 ml        Physical Exam  Vitals and nursing note reviewed.   Constitutional:       General: She is not in acute distress.     Appearance: She is obese. She is not ill-appearing or toxic-appearing.   HENT:      Head: Normocephalic and atraumatic.      Nose: Nose normal.      Mouth/Throat:      Mouth: Mucous membranes are moist.   Cardiovascular:      Rate and Rhythm: Normal rate and regular rhythm.      Heart sounds: Normal heart sounds. No murmur heard.    No gallop.      Comments: Unable to palpate pedal pulses due to  dressings  Pulmonary:      Effort: Pulmonary effort is normal. No respiratory distress.      Breath sounds: Normal breath sounds. No wheezing or rales.      Comments: Room air  Abdominal:      General: Bowel sounds are normal. There is no distension.      Palpations: Abdomen is soft.      Tenderness: There is no abdominal tenderness. There is no guarding.   Musculoskeletal:      Right lower leg: Edema present.      Left lower leg: Edema present.   Skin:     General: Skin is warm and dry.      Comments: Feet are bandaged   Neurological:      Mental Status: She is alert and oriented to person, place, and time.       Significant Labs: All pertinent labs within the past 24 hours have been reviewed.    Significant Imaging: I have reviewed all pertinent imaging results/findings within the past 24 hours.

## 2022-05-08 NOTE — NURSING
Reported off to oncoming nurse, patient resting in bed, aaox4. Patient can make needs known to staff, standby assist required for adls and transfers, no acute distress noted, safety precautions maintained.      Chart check completed.

## 2022-05-08 NOTE — PLAN OF CARE
Problem: Adult Inpatient Plan of Care  Goal: Plan of Care Review  Outcome: Ongoing, Progressing     Problem: Skin Injury Risk Increased  Goal: Skin Health and Integrity  Outcome: Ongoing, Progressing     Problem: Fall Injury Risk  Goal: Absence of Fall and Fall-Related Injury  Outcome: Ongoing, Progressing     Problem: Impaired Wound Healing  Goal: Optimal Wound Healing  Outcome: Ongoing, Progressing

## 2022-05-08 NOTE — PROGRESS NOTES
[facility] Medicine  Progress Note    Patient Name: Audrey Natarajan  MRN: [id number]  Patient Class: IP- Inpatient   Admission Date: 5/4/2022  Length of Stay: 4 days  Attending Physician: Keyona Darden MD  Primary Care Provider: Donaldo Pena MD        Subjective:     Principal Problem:Sepsis due to methicillin resistant Staphylococcus aureus (MRSA)        HPI:  Ms. Natarajan is a 51-year-old female with extensive past medical history including type 2 diabetes, hypertension, CKD, bipolar disorder, and recurrent foot infections who presents to the emergency department for evaluation of pain and worsening redness of her foot. Patient recently discharged from [facility] on April 26 after a complicated hospital stay due to psychosis and diabetic foot ulcer growing MRSA.  She was discharged home with her stepdaughter to continue antibiotics and follow-up.  The patient now states that her stepdaughter had taken her medications and hid them from her.  She states she just found her medications 3 days ago and started retaking her antibiotics but in the meantime, her feet wounds have worsened and she has noticed increased swelling and redness going up her legs.  She is also in significant pain.  She is very tearful.  She has felt feverish but no chills.  She denies any urinary symptoms.    In the emergency department, she was found to significant foot wounds.  See media tab.  She was also found to significant lab abnormalities including a WBC count of 20.5 1000, a sodium of 118, procalcitonin 0.34,  and . She was started IV antibiotics to cover her history of MRSA.  Medications were reviewed from previous hospital stay and restarted.  Podiatry was consulted.  She was admitted to hospital medicine for further management.      I spoke with her sister Anitra, and patient has not been taking her medications and has been accusing her stepdaughter - these are not true statements,  she feels like she is not able to take care of herself.       Overview/Hospital Course:  Mrs. Natarajan was admitted with sepsis, hyponatremia, and diabetic foot wounds.      Regarding sepsis and DM foot wounds: She was started on IV antibiotics with a history of MRSA. Blood culture 5/5 with Staph aureus in 1/4 cultures, repeat blood cultures negative. Podiatry consulted. MRI of L foot shows Charcot changes, difficult to rule out septic arthritis. MRI of R foot shows cellulitis and osteomyelitis of 1st digit. Patient declines amputation. Bone biopsy performed 5/6 with results pending. Plan for further debridement in OR when hyponatremia stable. ID consulted. Arterial US noted to be abnormal. Vascular surgery consulted as well.     Regarding hyponatremia: Due to carbamazepine. Na 113 at lowest. Nephrology consulted and started IVF. Na is slowly correcting. Psychiatry consulted due to stopping medication for bipolar disorder and concern for psychiatric disease becoming uncontrolled as a result. OK to continue divalproex, risperidone. No need for PEC.     She has also had anemia. Required transfusion 1U RBC on 5/5. No active GI bleeding noted. Resumed eliquis for bilateral DVTs noted 1 month ago.       Interval History: Feeling better today. Walking around room without assistance when I arrived. Pain in feet improved. Still has leg swelling. She feels constipated. She is tearful when discussing her foot infections.     Review of Systems   Constitutional:  Negative for chills and fever.   Respiratory:  Negative for shortness of breath.    Cardiovascular:  Positive for leg swelling. Negative for chest pain.   Gastrointestinal:  Positive for constipation. Negative for abdominal pain, diarrhea, nausea and vomiting.   Genitourinary:  Negative for difficulty urinating.   Musculoskeletal:  Positive for back pain. Negative for arthralgias and myalgias.   Skin:  Positive for wound.   Neurological:  Positive for weakness and  numbness.   Psychiatric/Behavioral:  Positive for dysphoric mood. Negative for confusion.    Objective:     Vital Signs (Most Recent):  Temp: 98.9 °F (37.2 °C) (05/08/22 0826)  Pulse: 78 (05/08/22 0826)  Resp: 18 (05/08/22 0939)  BP: 129/61 (05/08/22 0826)  SpO2: 97 % (05/08/22 0826) Vital Signs (24h Range):  Temp:  [98 °F (36.7 °C)-98.9 °F (37.2 °C)] 98.9 °F (37.2 °C)  Pulse:  [65-89] 78  Resp:  [16-19] 18  SpO2:  [97 %-99 %] 97 %  BP: (124-175)/(61-79) 129/61     Weight: 118.5 kg (261 lb 3.9 oz)  Body mass index is 42.17 kg/m².    Intake/Output Summary (Last 24 hours) at 5/8/2022 1004  Last data filed at 5/8/2022 0938  Gross per 24 hour   Intake 1480 ml   Output --   Net 1480 ml        Physical Exam  Vitals and nursing note reviewed.   Constitutional:       General: She is not in acute distress.     Appearance: She is obese. She is not ill-appearing or toxic-appearing.   HENT:      Head: Normocephalic and atraumatic.      Nose: Nose normal.      Mouth/Throat:      Mouth: Mucous membranes are moist.   Cardiovascular:      Rate and Rhythm: Normal rate and regular rhythm.      Heart sounds: Normal heart sounds. No murmur heard.    No gallop.      Comments: Unable to palpate pedal pulses due to dressings  Pulmonary:      Effort: Pulmonary effort is normal. No respiratory distress.      Breath sounds: Normal breath sounds. No wheezing or rales.      Comments: Room air  Abdominal:      General: Bowel sounds are normal. There is no distension.      Palpations: Abdomen is soft.      Tenderness: There is no abdominal tenderness. There is no guarding.   Musculoskeletal:      Right lower leg: Edema present.      Left lower leg: Edema present.   Skin:     General: Skin is warm and dry.      Comments: Feet are bandaged   Neurological:      Mental Status: She is alert and oriented to person, place, and time.       Significant Labs: All pertinent labs within the past 24 hours have been reviewed.    Significant Imaging: I have  "reviewed all pertinent imaging results/findings within the past 24 hours.      Assessment/Plan:      * Sepsis due to methicillin resistant Staphylococcus aureus (MRSA)  This patient does have evidence of infective focus- bilateral DM foot wounds and bacteremia  My overall impression is sepsis. Vital signs were reviewed and noted in progress note.  Antibiotics given-   Antibiotics (From admission, onward)            Start     Stop Route Frequency Ordered    05/07/22 1900  ciprofloxacin HCl tablet 500 mg         -- Oral Every 12 hours 05/07/22 1135    05/05/22 1415  metronidazole IVPB 500 mg         -- IV Every 8 hours (non-standard times) 05/05/22 1311    05/04/22 2230  vancomycin (VANCOCIN) 1,750 mg in dextrose 5 % 500 mL IVPB         -- IV Every 12 hours (non-standard times) 05/04/22 1148    05/04/22 1245  mupirocin 2 % ointment         05/09 0859 Nasl 2 times daily 05/04/22 1135    05/04/22 1103  vancomycin - pharmacy to dose  (vancomycin IVPB)        "And" Linked Group Details    -- IV pharmacy to manage frequency 05/04/22 1004        Cultures were taken-   Microbiology Results (last 7 days)     Procedure Component Value Units Date/Time    Aerobic culture [[id number]] Collected: 05/06/22 1249    Order Status: Completed Specimen: Bone from Toe, Right Foot Updated: 05/08/22 0904     Aerobic Bacterial Culture No growth    AFB Culture & Smear [[id number]] Collected: 05/06/22 1249    Order Status: Sent Specimen: Bone from Toe, Right Foot Updated: 05/07/22 1549    Blood culture [[id number]] Collected: 05/06/22 1155    Order Status: Completed Specimen: Blood from Antecubital, Right Hand Updated: 05/07/22 1303     Blood Culture, Routine No Growth to date      No Growth to date    Blood culture [[id number]] Collected: 05/06/22 1155    Order Status: Completed Specimen: Blood from Peripheral, Left Hand Updated: 05/07/22 1303     Blood Culture, Routine No Growth to date      No Growth to date    Blood culture x two cultures. " Draw prior to antibiotics. [[id number]] Collected: 05/04/22 0947    Order Status: Completed Specimen: Blood from Peripheral, Antecubital, Left Updated: 05/07/22 1103     Blood Culture, Routine No Growth to date      No Growth to date      No Growth to date      No Growth to date    Narrative:      Aerobic and anaerobic    Blood culture x two cultures. Draw prior to antibiotics. [[id number]]  (Abnormal)  (Susceptibility) Collected: 05/04/22 0944    Order Status: Completed Specimen: Blood from Peripheral, Antecubital, Right Updated: 05/07/22 0809     Blood Culture, Routine Gram stain abbe bottle: Gram positive cocci in clusters resembling Staph       Results called to and read back by: Linnette KRAMER 05/05/2022        09:58      METHICILLIN RESISTANT STAPHYLOCOCCUS AUREUS    Narrative:      Aerobic and anaerobic    Gram stain [[id number]] Collected: 05/06/22 1249    Order Status: Completed Specimen: Bone from Toe, Right Foot Updated: 05/06/22 1446     Gram Stain Result No organisms seen    Fungus culture [[id number]] Collected: 05/06/22 1249    Order Status: Sent Specimen: Bone from Toe, Right Foot Updated: 05/06/22 1302    Aerobic culture [[id number]]  (Abnormal)  (Susceptibility) Collected: 05/04/22 1500    Order Status: Completed Specimen: Wound from Foot, Right Updated: 05/06/22 1126     Aerobic Bacterial Culture Results called to and read back by: Linnette Alvarado 05/06/2022  08:40      ESCHERICHIA COLI  Many        ENTEROBACTER CLOACAE  Moderate        METHICILLIN RESISTANT STAPHYLOCOCCUS AUREUS  Many      Aerobic culture [[id number]]  (Abnormal)  (Susceptibility) Collected: 05/04/22 1500    Order Status: Completed Specimen: Wound from Foot, Left Updated: 05/06/22 0844     Aerobic Bacterial Culture Results called to and read back by: Linnette Alvarado 05/06/2022  08:40      METHICILLIN RESISTANT STAPHYLOCOCCUS AUREUS  Many          Latest lactate reviewed, they are-  No results for input(s): LACTATE in the last  72 hours.  Staph bacteremia- repeat blood cultures. TTE no vegetation.    MRI L foot: mid/forefoot cellulitis, Charcot changes cannot rule out septic arthritis  MRI R foot: cellulitis forefoot and great toe. Osteomyelitis of 1st digit    Podiatry following. Declines amputation. Plan for further operative debridement- anticipate sometime this week.   Vascular consulted for abnormal arterial US- recs pending   ID consulted- anticipate prolonged IV antibiotics     Constipation  Bowel regimen ordered      PAD (peripheral artery disease)  Arterial US abnormal  Vascular consulted - recs pending       Cellulitis of both feet  See sepsis      Iron deficiency anemia  Hgb decreased. No bleeding noted. Required 1U RBC transfusion on 5/5 with inappropriate response  - CBC in AM  - TSAT low       Osteomyelitis of right foot  See sepsis      Bacteremia due to Staphylococcus  See sepsis      Hyponatremia  Presents with a sodium of 118 which is new. Worsened to 113. She is currently asymptomatic. Cause= carbamazepine  - consulted Nephrology for help with management  - carbamazepine added to allergy/intolerance list to prevent it from being prescribed again   - monitor BMP          Diabetic foot ulcer associated with type 2 diabetes mellitus  A1c:   Lab Results   Component Value Date    HGBA1C 7.0 (H) 04/13/2022     Meds: SSI PRN to maintain goal 140-180  ADA diet, accuchecks, hypoglycemic protocol          Chronic deep vein thrombosis (DVT) of both lower extremities  Present since 2021. Still present 4/2022  - resumed anticoagulation     Long term (current) use of anticoagulants  For chronic DVT, last visualized 4/2022  Resume anticoagulation as no active bleeding has been identified       Severe obesity (BMI >= 40)  Body mass index is 42.17 kg/m². Morbid obesity complicates all aspects of disease management from diagnostic modalities to treatment. Weight loss encouraged and health benefits explained to  patient.         Thrombocytosis  Due to iron deficiency. Patient also reports history of splenectomy.       Bipolar 1 disorder  Long history of bipolar 1 disorder with recent psychosis at last hospital stay.  Carbamazepine has caused hyponatremia.  - sister Anitra states patient has not been compliant with her medications and feels that she still has some paranoia that her stepdaughter had been hiding her medications though she believes it was the patient herself.  - Psych consulted- OK to continue current regimen of risperidone and depakoate minus carbamazepine  - VPA level on Monday (ordered)      Diabetic neuropathy  Noted. Contributes to foot infections      COPD (chronic obstructive pulmonary disease)  No signs of COPD exacerbation. No PFTs to review.   Resume home medications      Essential hypertension  BP generally controlled  Continue metoprolol         VTE Risk Mitigation (From admission, onward)         Ordered     apixaban tablet 5 mg  2 times daily         05/07/22 1110     IP VTE HIGH RISK PATIENT  Once         05/04/22 1259     Place sequential compression device  Until discontinued         05/04/22 1259                Discharge Planning   HUMBERTO:      Code Status: Full Code   Is the patient medically ready for discharge?:     Reason for patient still in hospital (select all that apply): Patient trending condition  Discharge Plan A: Long-term acute care facility (LTAC)                  Keyona Darden MD  Department of Hospital Medicine   [facility]

## 2022-05-08 NOTE — ASSESSMENT & PLAN NOTE
"This patient does have evidence of infective focus- bilateral DM foot wounds and bacteremia  My overall impression is sepsis. Vital signs were reviewed and noted in progress note.  Antibiotics given-   Antibiotics (From admission, onward)            Start     Stop Route Frequency Ordered    05/07/22 1900  ciprofloxacin HCl tablet 500 mg         -- Oral Every 12 hours 05/07/22 1135    05/05/22 1415  metronidazole IVPB 500 mg         -- IV Every 8 hours (non-standard times) 05/05/22 1311    05/04/22 2230  vancomycin (VANCOCIN) 1,750 mg in dextrose 5 % 500 mL IVPB         -- IV Every 12 hours (non-standard times) 05/04/22 1148    05/04/22 1245  mupirocin 2 % ointment         05/09 0859 Nasl 2 times daily 05/04/22 1135    05/04/22 1103  vancomycin - pharmacy to dose  (vancomycin IVPB)        "And" Linked Group Details    -- IV pharmacy to manage frequency 05/04/22 1004        Cultures were taken-   Microbiology Results (last 7 days)     Procedure Component Value Units Date/Time    Aerobic culture [[id number]] Collected: 05/06/22 1249    Order Status: Completed Specimen: Bone from Toe, Right Foot Updated: 05/08/22 0904     Aerobic Bacterial Culture No growth    AFB Culture & Smear [[id number]] Collected: 05/06/22 1249    Order Status: Sent Specimen: Bone from Toe, Right Foot Updated: 05/07/22 1549    Blood culture [[id number]] Collected: 05/06/22 1155    Order Status: Completed Specimen: Blood from Antecubital, Right Hand Updated: 05/07/22 1303     Blood Culture, Routine No Growth to date      No Growth to date    Blood culture [[id number]] Collected: 05/06/22 1155    Order Status: Completed Specimen: Blood from Peripheral, Left Hand Updated: 05/07/22 1303     Blood Culture, Routine No Growth to date      No Growth to date    Blood culture x two cultures. Draw prior to antibiotics. [[id number]] Collected: 05/04/22 0947    Order Status: Completed Specimen: Blood from Peripheral, Antecubital, Left Updated: 05/07/22 1103     " Blood Culture, Routine No Growth to date      No Growth to date      No Growth to date      No Growth to date    Narrative:      Aerobic and anaerobic    Blood culture x two cultures. Draw prior to antibiotics. [[id number]]  (Abnormal)  (Susceptibility) Collected: 05/04/22 0944    Order Status: Completed Specimen: Blood from Peripheral, Antecubital, Right Updated: 05/07/22 0809     Blood Culture, Routine Gram stain abbe bottle: Gram positive cocci in clusters resembling Staph       Results called to and read back by: Linnette KRAMER 05/05/2022        09:58      METHICILLIN RESISTANT STAPHYLOCOCCUS AUREUS    Narrative:      Aerobic and anaerobic    Gram stain [[id number]] Collected: 05/06/22 1249    Order Status: Completed Specimen: Bone from Toe, Right Foot Updated: 05/06/22 1446     Gram Stain Result No organisms seen    Fungus culture [[id number]] Collected: 05/06/22 1249    Order Status: Sent Specimen: Bone from Toe, Right Foot Updated: 05/06/22 1302    Aerobic culture [[id number]]  (Abnormal)  (Susceptibility) Collected: 05/04/22 1500    Order Status: Completed Specimen: Wound from Foot, Right Updated: 05/06/22 1126     Aerobic Bacterial Culture Results called to and read back by: Linnette Alvarado 05/06/2022  08:40      ESCHERICHIA COLI  Many        ENTEROBACTER CLOACAE  Moderate        METHICILLIN RESISTANT STAPHYLOCOCCUS AUREUS  Many      Aerobic culture [[id number]]  (Abnormal)  (Susceptibility) Collected: 05/04/22 1500    Order Status: Completed Specimen: Wound from Foot, Left Updated: 05/06/22 0844     Aerobic Bacterial Culture Results called to and read back by: Linnette Alvarado 05/06/2022  08:40      METHICILLIN RESISTANT STAPHYLOCOCCUS AUREUS  Many          Latest lactate reviewed, they are-  No results for input(s): LACTATE in the last 72 hours.  Staph bacteremia- repeat blood cultures. TTE no vegetation.    MRI L foot: mid/forefoot cellulitis, Charcot changes cannot rule out septic arthritis  MRI R  foot: cellulitis forefoot and great toe. Osteomyelitis of 1st digit    Podiatry following. Declines amputation. Plan for further operative debridement- anticipate sometime this week.   Vascular consulted for abnormal arterial US- recs pending   ID consulted- anticipate prolonged IV antibiotics

## 2022-05-08 NOTE — PROGRESS NOTES
Renal Progress Note    Date of Admission:  5/4/2022  9:16 AM    Length of Stay: 4  Days    Subjective: n/a    Objective:    Current Facility-Administered Medications   Medication    0.9%  NaCl infusion (for blood administration)    0.9%  NaCl infusion    acetaminophen tablet 1,000 mg    apixaban tablet 5 mg    aspirin chewable tablet 81 mg    ciprofloxacin HCl tablet 500 mg    dextrose 10% bolus 125 mL    dextrose 10% bolus 250 mL    divalproex EC tablet 1,250 mg    divalproex EC tablet 500 mg    docusate sodium capsule 100 mg    fluticasone furoate-vilanteroL 100-25 mcg/dose diskus inhaler 1 puff    glucagon (human recombinant) injection 1 mg    glucagon (human recombinant) injection 1 mg    glucose chewable tablet 16 g    glucose chewable tablet 24 g    hydrOXYzine pamoate capsule 50 mg    insulin aspart U-100 pen 0-5 Units    metoprolol tartrate (LOPRESSOR) tablet 50 mg    metronidazole IVPB 500 mg    mupirocin 2 % ointment    naloxone 0.4 mg/mL injection 0.02 mg    naloxone 0.4 mg/mL injection 0.02 mg    OLANZapine tablet 10 mg    oxyCODONE-acetaminophen 7.5-325 mg per tablet 1 tablet    pantoprazole EC tablet 40 mg    pravastatin tablet 40 mg    risperiDONE tablet 2 mg    risperiDONE tablet 3 mg    sodium chloride 0.9% flush 10 mL    sodium chloride 0.9% flush 10 mL    sodium zirconium cyclosilicate packet 5 g    vancomycin (VANCOCIN) 1,750 mg in dextrose 5 % 500 mL IVPB    vancomycin - pharmacy to dose       Vitals:    05/07/22 2020 05/07/22 2046 05/08/22 0055 05/08/22 0426   BP:  (!) 142/67 124/61 137/69   BP Location:  Right arm Right arm Left arm   Patient Position:  Lying Sitting Sitting   Pulse:  80 65 68   Resp: 19 17 16 18   Temp:  98 °F (36.7 °C) 98.1 °F (36.7 °C) 98.4 °F (36.9 °C)   TempSrc:  Oral Oral Oral   SpO2:  99% 97% 97%   Weight:       Height:           I/O last 3 completed shifts:  In: 1380 [P.O.:900; Other:480]  Out: 235  [Urine:235]  No intake/output data recorded.      Physical Exam: n/a      Laboratories:    No results for input(s): WBC, RBC, HGB, HCT, PLT, MCV, MCH, MCHC in the last 24 hours.    Recent Labs   Lab 05/07/22 2031   CALCIUM 8.3*   *   K 5.3*   CO2 25   CL 96   BUN 9   CREATININE 0.8       No results for input(s): COLORU, CLARITYU, SPECGRAV, PHUR, PROTEINUA, GLUCOSEU, BLOODU, WBCU, RBCU, BACTERIA, MUCUS in the last 24 hours.    Invalid input(s):  BILIRUBINCON    Microbiology Results (last 7 days)     Procedure Component Value Units Date/Time    AFB Culture & Smear [[id number]] Collected: 05/06/22 1249    Order Status: Sent Specimen: Bone from Toe, Right Foot Updated: 05/07/22 1549    Blood culture [[id number]] Collected: 05/06/22 1155    Order Status: Completed Specimen: Blood from Antecubital, Right Hand Updated: 05/07/22 1303     Blood Culture, Routine No Growth to date      No Growth to date    Blood culture [[id number]] Collected: 05/06/22 1155    Order Status: Completed Specimen: Blood from Peripheral, Left Hand Updated: 05/07/22 1303     Blood Culture, Routine No Growth to date      No Growth to date    Blood culture x two cultures. Draw prior to antibiotics. [[id number]] Collected: 05/04/22 0947    Order Status: Completed Specimen: Blood from Peripheral, Antecubital, Left Updated: 05/07/22 1103     Blood Culture, Routine No Growth to date      No Growth to date      No Growth to date      No Growth to date    Narrative:      Aerobic and anaerobic    Aerobic culture [[id number]] Collected: 05/06/22 1249    Order Status: Completed Specimen: Bone from Toe, Right Foot Updated: 05/07/22 0941     Aerobic Bacterial Culture No growth    Blood culture x two cultures. Draw prior to antibiotics. [[id number]]  (Abnormal)  (Susceptibility) Collected: 05/04/22 0944    Order Status: Completed Specimen: Blood from Peripheral, Antecubital, Right Updated: 05/07/22 0809     Blood Culture, Routine Gram stain abbe bottle: Gram  positive cocci in clusters resembling Staph       Results called to and read back by: Linnette KRAMER 05/05/2022        09:58      METHICILLIN RESISTANT STAPHYLOCOCCUS AUREUS    Narrative:      Aerobic and anaerobic    Gram stain [[id number]] Collected: 05/06/22 1249    Order Status: Completed Specimen: Bone from Toe, Right Foot Updated: 05/06/22 1446     Gram Stain Result No organisms seen    Fungus culture [[id number]] Collected: 05/06/22 1249    Order Status: Sent Specimen: Bone from Toe, Right Foot Updated: 05/06/22 1302    Aerobic culture [[id number]]  (Abnormal)  (Susceptibility) Collected: 05/04/22 1500    Order Status: Completed Specimen: Wound from Foot, Right Updated: 05/06/22 1126     Aerobic Bacterial Culture Results called to and read back by: Linnette Alvarado 05/06/2022  08:40      ESCHERICHIA COLI  Many        ENTEROBACTER CLOACAE  Moderate        METHICILLIN RESISTANT STAPHYLOCOCCUS AUREUS  Many      Aerobic culture [[id number]]  (Abnormal)  (Susceptibility) Collected: 05/04/22 1500    Order Status: Completed Specimen: Wound from Foot, Left Updated: 05/06/22 0844     Aerobic Bacterial Culture Results called to and read back by: Linnette Alvarado 05/06/2022  08:40      METHICILLIN RESISTANT STAPHYLOCOCCUS AUREUS  Many              Diagnostic Tests: n/a        Assessment:       50 y/o female with Bipolar disorder admitted with:     - Infected (polymicrobial) Bilateral diabetic foot ulcers, L-ulcer with necrotic base.  - Hyponatremia without excess of ADH and normal cortisol and TSH-T4  - Hyperkalemia RESOLVED  - Hypochloremia  IMPROVING  - HypoMg++ CORRECTED  - Hypoalbuminemia  - DM2  - Fe++ def. anemia        Plan:       - Free H2O restriction  - IV n.s. for now (stop later today)  - Oral Na+Cl-  - Check Aldactone level (pte. May have Mineralcorticoid deficiency resulting in intermittent hyperK+ and hypoNa+)  - p.o. Na+ zirconium K+ binder Q day as needed  - Replace Mg++ as needed  - Antib.  - Will  follow serum sodium  - Wound care as per Podiatry  - Psychiatry following  - Anemia management, Glycemic control and other problems per admitting

## 2022-05-08 NOTE — ASSESSMENT & PLAN NOTE
Presents with a sodium of 118 which is new. Worsened to 113. She is currently asymptomatic. Cause= carbamazepine  - consulted Nephrology for help with management  - carbamazepine added to allergy/intolerance list to prevent it from being prescribed again   - monitor BMP

## 2022-05-08 NOTE — NURSING
Received report from DANK Harris. Patient lying in bed resting, NAD noted. Safety Precautions maintained, Will Monitor.

## 2022-05-08 NOTE — PROGRESS NOTES
[facility]  Podiatry  Progress Note    Patient Name: Audrey Natarajan  MRN: [id number]  Admission Date: 5/4/2022  Hospital Length of Stay: 4 days  Attending Physician: Keyona Darden MD  Primary Care Provider: Donaldo Pena MD     Subjective:     History of Present Illness: 52 y/o female PMH DM2, bipolar admitted for wound infection B/L. Patient recently discharged from [facility] on 4/26/22. Patient reports unable to take medications as family member was hiding meds from her. Seen earlier today in podiatry clinic Dr. De Los Santos, sent to ED for admission.     5/6/22: Patient seen bedside. Bandages intact B/L    5/8/2022 patient seen at bedside resting comfortably.  Dressing somewhat disheveled because she relates that she walks on floor without any type of shoe.  She relates feeling overall better.  No new pedal complaints.      Scheduled Meds:   apixaban  5 mg Oral BID    aspirin  81 mg Oral Daily    ciprofloxacin HCl  500 mg Oral Q12H    divalproex  1,250 mg Oral QHS    divalproex  500 mg Oral Daily    docusate sodium  100 mg Oral Daily    fluticasone furoate-vilanteroL  1 puff Inhalation Daily    hydrOXYzine pamoate  50 mg Oral TID    metoprolol tartrate  50 mg Oral BID    metronidazole  500 mg Intravenous Q8H    mupirocin   Nasal BID    pantoprazole  40 mg Oral Daily    pravastatin  40 mg Oral QHS    risperiDONE  2 mg Oral Daily    risperiDONE  3 mg Oral QHS    sodium chloride  1 g Oral QID    sodium zirconium cyclosilicate  5 g Oral Daily    vancomycin (VANCOCIN) IVPB  1,750 mg Intravenous Q12H     Continuous Infusions:   sodium chloride 0.9% 100 mL/hr at 05/08/22 0521     PRN Meds:sodium chloride, acetaminophen, dextrose 10%, dextrose 10%, glucagon (human recombinant), glucagon (human recombinant), glucose, glucose, insulin aspart U-100, naloxone, naloxone, OLANZapine, oxyCODONE-acetaminophen, sodium chloride 0.9%, sodium chloride 0.9%, Pharmacy to dose Vancomycin consult  "**AND** vancomycin - pharmacy to dose    Review of patient's allergies indicates:   Allergen Reactions    Morphine Other (See Comments)     Patient had a psychotic episode after taking Morphine  Agitation, hallucinations    Penicillins Anaphylaxis     itching    Januvia [sitagliptin] Hives    Carbamazepine Other (See Comments)     hyponatremia        Past Medical History:   Diagnosis Date    ADHD (attention deficit hyperactivity disorder)     Arthritis     Asthma     Bipolar 1 disorder     Cataract     Cigarette smoker     COPD (chronic obstructive pulmonary disease)     Coronary artery disease     A fib    Depression     bipolar manic depresson    Diabetes mellitus     Diabetic foot ulcers     Diabetic neuropathy     DVT of lower extremity, bilateral 07/2013    bilateral LE DVT. [place] filter placed.     Encounter for blood transfusion     History of blood clots 1. Left Leg=2003; 2.Bilateral Groin=Blood Clots= 5 or 6/ 2013 & 7/2013; 3. LLL of Lung=7/2013;  4. Lt. Lower Leg=7/2013.     Pt. had 1st Blood Clot after Zsyabjocyzka=4949, & Last=2013. [place] Filter= Rt.Lateral Neck.    HTN (hypertension) 06/06/2013    Pt states that she does not have hypertension    Hypercholesteremia     Irregular heartbeat     Neuromuscular disorder     neuropathy feet    Obese     PE (pulmonary embolism) 07/2013    bilat LE DVT.     Restless leg syndrome      Past Surgical History:   Procedure Laterality Date    ABDOMINAL SURGERY  2010    gastric sleeve    BILATERAL OOPHORECTOMY Bilateral 1/12/2015    CHOLECYSTECTOMY      Green' s filter Right 7/4/2012    Right Neck & Tunneled Down.    HERNIA REPAIR      "[place] of Hernias Repaires around th [place].", pt. states    LAPAROSCOPIC CHOLECYSTECTOMY N/A 9/10/2020    Procedure: CHOLECYSTECTOMY, LAPAROSCOPIC;  Surgeon: Montrell Gutierrez MD;  Location: [facility];  Service: General;  Laterality: N/A;  RN PREOP 9/9----COVID Negative  9/9    OVARIAN CYST " REMOVAL  3/13/2014    CO REMOVAL OF OVARY/TUBE(S)      SPLENECTOMY, TOTAL  2003    TONSILLECTOMY      as a child    TYMPANOSTOMY TUBE PLACEMENT  1976    VEIN SURGERY      Lt leg       Family History     Problem Relation (Age of Onset)    Cataracts Father    Diabetes Father, Paternal Grandfather    Heart disease Father, Paternal Grandfather    Hypertension Father    No Known Problems Mother, Sister, Brother, Maternal Aunt, Maternal Uncle, Paternal Aunt, Paternal Uncle, Maternal Grandfather    Ovarian cancer Maternal Grandmother, Paternal Grandmother        Tobacco Use    Smoking status: Current Every Day Smoker     Packs/day: 1.00     Years: 37.00     Pack years: 37.00     Types: Cigarettes     Last attempt to quit: 2020     Years since quittin.4    Smokeless tobacco: Never Used    Tobacco comment: Enrolled in the [employer] on 5/3/14 ([facility] Member ID # [id number]). Ambulatory referral to Smoking Cessation Program   Substance and Sexual Activity    Alcohol use: No     Alcohol/week: 0.0 standard drinks    Drug use: No    Sexual activity: Yes     Partners: Male     Review of Systems   Constitutional: Negative for activity change, appetite change, chills, fatigue and fever.   Respiratory: Negative for cough and shortness of breath.    Cardiovascular: Positive for leg swelling. Negative for chest pain.   Gastrointestinal: Negative for diarrhea, nausea and vomiting.   Musculoskeletal: Positive for arthralgias and myalgias.   Skin: Positive for wound.   Neurological: Positive for numbness. Negative for weakness.        + paresthesia    Psychiatric/Behavioral: The patient is nervous/anxious.      Objective:     Vital Signs (Most Recent):  Temp: 98.4 °F (36.9 °C) (22)  Pulse: 68 (22)  Resp: 18 (22)  BP: 137/69 (22)  SpO2: 97 % (22) Vital Signs (24h Range):  Temp:  [98 °F (36.7 °C)-98.8 °F (37.1 °C)] 98.4 °F (36.9 °C)  Pulse:  [65-92]  68  Resp:  [16-19] 18  SpO2:  [97 %-99 %] 97 %  BP: (124-175)/(61-79) 137/69     Weight: 118.5 kg (261 lb 3.9 oz)  Body mass index is 42.17 kg/m².    Foot Exam    General  Orientation: alert and oriented to person, place, and time       Right Foot/Ankle     Neurovascular  Dorsalis pedis: 1+  Posterior tibial: 1+  Saphenous nerve sensation: diminished  Tibial nerve sensation: diminished  Superficial peroneal nerve sensation: diminished  Deep peroneal nerve sensation: diminished  Sural nerve sensation: diminished      Left Foot/Ankle      Neurovascular  Dorsalis pedis: 1+  Posterior tibial: 1+  Saphenous nerve sensation: diminished  Tibial nerve sensation: diminished  Superficial peroneal nerve sensation: diminished  Deep peroneal nerve sensation: diminished  Sural nerve sensation: diminished          05/08/2022 5/6/22:     Wound 1: Left plantar foot   Measurement: 5lwt3yjf8.3cm  pre debridement 5.5cmx5.5cmx0.4cm post debridement.  Base: fibrogranular base   Periwound skin: HPK, maceration   Drainage: serous   Erythema: mild  Probe: deep probe         Pre debridement       Post debridement             5/4/22:    Right foot- Epic unable to load image  Right plantar hallux- probe to periosteum fibrotic base  Right plantar forefoot - wound with fibrotic base.   Left foot  Left plantar foot ulceration with necrotic base deep probe to bone        Laboratory:  CBC:   Recent Labs   Lab 05/07/22  0556   WBC 11.79   RBC 2.76*   HGB 7.5*   HCT 23.0*   *   MCV 83   MCH 27.2   MCHC 32.6     CMP:   Recent Labs   Lab 05/04/22  0944 05/04/22  1814 05/07/22  2031   *   < > 173*   CALCIUM 8.0*   < > 8.3*   ALBUMIN 2.1*  --   --    PROT 6.2  --   --    *   < > 128*   K 5.0   < > 5.3*   CO2 25   < > 25   CL 85*   < > 96   BUN 12   < > 9   CREATININE 0.6   < > 0.8   ALKPHOS 136*  --   --    ALT 14  --   --    AST 18  --   --    BILITOT 0.3  --   --     < > = values in this interval not displayed.        Diagnostic Results:  Xray:  X-Ray Foot Complete Right  Order: [id number]   Status: Final result     Visible to patient: Yes (not seen)     Next appt: 05/09/2022 at 02:00 PM in Gastroenterology (Saloni De Anda MD)     Dx: Infection     0 Result Notes    Details    [facility] Date Result Priority   Josr Almanzar MD  [phone]  [phone] 5/4/2022 STAT     Narrative & Impression  EXAMINATION:  XR FOOT COMPLETE 3 VIEW RIGHT     CLINICAL HISTORY:  . Unspecified infectious disease     TECHNIQUE:  AP, lateral, and oblique views of the right foot were performed.     COMPARISON:  Right foot radiograph 04/20/2022.     FINDINGS:  No definite evidence of acute fracture or dislocation.  Lisfranc joint appears congruent.  There appears to be chronic appearing deformity at the 4th digit metatarsal head and neck with erosive change at the lateral aspect of the head.     Joint spaces appear to be maintained.  There is soft tissue swelling most pronounced at the dorsum of the mid and forefoot.  No definite radiopaque foreign body.  Enthesopathic change at the calcaneus.     Impression:     No definite evidence of acute fracture or dislocation.     MRI: MRI Foot (Forefoot) Right Without Contrast  Order: [id number]   Status: Final result     Visible to patient: Yes (not seen)     Next appt: 05/09/2022 at 02:00 PM in Gastroenterology (Saloni De Anda MD)     Dx: Other acute osteomyelitis, unspecifie...     0 Result Notes    Details    [facility] Reading Date Result Priority   Tu Santos Jr., MD  [phone]  [phone] 5/6/2022 Routine     Narrative & Impression  EXAMINATION:  MRI FOOT (FOREFOOT) RIGHT WITHOUT CONTRAST     CLINICAL HISTORY:  Osteomyelitis, foot;eval OM, abscess right  plantar forefoot ulceration right hallux;  Other acute osteomyelitis, unspecified ankle and foot     TECHNIQUE:  Noncontrast MRI of the right forefoot     COMPARISON:  X-ray 05/04/2022     FINDINGS:  There are  hammertoe deformities of digits 2 through 4.  There is a fracture of the 4th metatarsal head without surrounding marrow edema but with visualization of the fracture line suggesting that it may be subacute to chronic.     There is soft tissue ulceration and skin thickening of the distal aspect of the great toe with subtle high T2 and low T1 signal involving the distal tuft compatible with cellulitis and osteomyelitis.  No focal soft tissue fluid collection.  Diffuse dorsal forefoot soft tissue edema is present and there are chronic denervation changes throughout the muscles.     Impression:     Cellulitis of the forefoot and great toe with osteomyelitis of the distal tuft of the 1st digit     Fracture of the 4th metatarsal neck, possibly subacute to chronic.           MRI: MRI Foot (Midfoot) Left Without Contrast  Order: [id number]   Status: Final result     Visible to patient: Yes (not seen)     Next appt: 05/09/2022 at 02:00 PM in Gastroenterology (Saloni De Anda MD)     Dx: Other acute osteomyelitis, unspecifie...     0 Result Notes    Details    [facility] Reading Date Result Priority   Tu Santos Jr., MD  [phone]  [phone] 5/6/2022 Routine     Narrative & Impression  EXAMINATION:  MRI FOOT (MIDFOOT) LEFT WITHOUT CONTRAST     CLINICAL HISTORY:  Osteomyelitis, foot;R/o abscess, OM left plantar foot ulceration;  Other acute osteomyelitis, unspecified ankle and foot     TECHNIQUE:  Multiplanar multisequence MRI of the left midfoot without intravenous contrast.     COMPARISON:  X-ray 04/20/2022     FINDINGS:  Diffuse advanced destructive arthropathic changes are seen throughout the midfoot with abnormal flatfoot deformity.  There is marked skin thickening and a large area of soft tissue ulceration at the plantar aspect of the midfoot without localized fluid collection or evidence of a sinus tract extending to the bone.  Findings are associated with disruption the central aspect of the plantar  aponeurosis and diffuse high signal within the plantar muscles of the midfoot.  No definite fatty atrophy.     Diffuse forefoot and midfoot soft tissue edema is     Impression:     Midfoot and forefoot cellulitis and plantar soft tissue phlegmon.     Destructive arthropathy of the midfoot most likely related to Charcot changes with superimposed septic arthritis difficult to exclude with certainty given the proximity to the foot ulcer and adjacent inflammatory change.     No drainable abscess.          Arterial US:  Contains abnormal data US Lower Extremity Arteries Bilateral  Order: [id number]   Status: Final result     Visible to patient: Yes (not seen)     Next appt: 05/09/2022 at 02:00 PM in Gastroenterology (Saloni De Anda MD)     Dx: Diabetic ulcer of other part of foot ...     0 Result Notes    Details    [facility] Reading Date Result Priority   Samuel Harris MD  [phone]  [phone] 5/6/2022 Routine     Narrative & Impression  EXAMINATION:  US LOWER EXTREMITY ARTERIES BILATERAL     CLINICAL HISTORY:  PAD eval;     TECHNIQUE:  Bilateral lower extremity arterial duplex ultrasound examination performed. Multiple gray scale and color doppler images were obtained in addition to waveform analysis.     COMPARISON:  Left lower extremity arterial Doppler 07/13/2021     FINDINGS:  Right lower extremity     Common femoral artery: 111-cm/sec; triphasic waveforms     Deep femoral artery, proximal: 70-cm/sec; triphasic waveforms     Superficial femoral artery, proximal: 100-cm/sec; triphasic waveforms     Superficial femoral artery, mid portion: 135-cm/sec; triphasic waveforms     Superficial femoral artery, distal: 137-cm/sec; triphasic waveforms     Popliteal artery, proximal: 83-cm/sec; triphasic waveforms     Popliteal artery, distal: 125-cm/sec; triphasic waveforms     Anterior tibial artery: 121-cm/sec; triphasic waveforms     Posterior tibial artery: 48-cm/sec; triphasic waveforms     Peroneal artery:  48-cm/sec; triphasic waveforms     Dorsalis pedis artery: 133-cm/sec; triphasic waveforms     Left lower extremity     Common femoral artery: 120-cm/sec; triphasic waveforms     Deep femoral artery, proximal: 84-cm/sec; triphasic waveforms     Superficial femoral artery, proximal: 150-cm/sec; triphasic waveforms     Superficial femoral artery, mid portion: 189-cm/sec; triphasic waveforms     Superficial femoral artery, distal: 157-cm/sec; triphasic waveforms     Popliteal artery, proximal: 114-cm/sec; triphasic waveforms     Popliteal artery, distal: 139-cm/sec; triphasic waveforms     Anterior tibial artery: 58-cm/sec; triphasic waveforms     Posterior tibial artery: 61-cm/sec; triphasic waveforms     Peroneal artery: 48-cm/sec; triphasic waveforms     Dorsalis pedis artery: 177-cm/sec; triphasic waveforms     Impression:     1. Sonogram suggest hemodynamically significant stenosis in the left and DPA.  2. Multifocal mild to moderate grade stenoses is suspected throughout the left MIRACLE and, bilateral posterior tibial and peroneal arteries.  This report was flagged in Epic as abnormal.             Clinical Findings:  B/L ulceration probe to periosteum right hallux     Assessment/Plan:     Active Diagnoses:    Diagnosis Date Noted POA    PRINCIPAL PROBLEM:  Sepsis due to methicillin resistant Staphylococcus aureus (MRSA) [A41.02] 04/18/2022 Yes    Iron deficiency anemia [D50.9] 05/06/2022 Yes    Cellulitis of both feet [L03.115, L03.116] 05/06/2022 Yes    PAD (peripheral artery disease) [I73.9] 05/06/2022 Yes    Bacteremia due to Staphylococcus [R78.81, B95.8]  Yes    Osteomyelitis of right foot [M86.9]  Yes    Hyponatremia [E87.1] 05/04/2022 Yes    Chronic deep vein thrombosis (DVT) of both lower extremities [I82.503] 04/13/2022 Yes    Diabetic foot ulcer associated with type 2 diabetes mellitus [E11.621, L97.509] 04/13/2022 Yes    Long term (current) use of anticoagulants [Z79.01] 09/03/2019 Not Applicable     Severe obesity (BMI >= 40) [E66.01] 08/10/2016 Yes    Thrombocytosis [D75.839] 07/16/2016 Yes    Bipolar 1 disorder [F31.9] 04/13/2015 Yes     Chronic    Diabetic neuropathy [E11.40] 11/28/2014 Yes    COPD (chronic obstructive pulmonary disease) [J44.9] 10/11/2013 Yes     Chronic    Essential hypertension [I10] 06/06/2013 Yes     Chronic      Problems Resolved During this Admission:    Diagnosis Date Noted Date Resolved POA    Chest pain [R07.9]  05/06/2022 Yes    Infection [B99.9]  05/06/2022 Yes    Wound infection [T14.8XXA, L08.9]  05/06/2022 Yes    Anemia [D64.9] 04/13/2022 05/06/2022 Yes    Cellulitis of lower extremity [L03.119] 02/10/2021 05/06/2022 Yes    Hypercoagulable state [D68.59] 09/25/2019 05/06/2022 Yes    Leukocytosis [D72.829] 07/16/2016 05/06/2022 Yes       Discussed two options with patient. Amputation of  Right hallux, L BKA   vs IV abx for six weeks with aggressive wound care for several months with no guarantee of wound resolution.  Patient declines amputation of right hallux, also declines L BKA. Elects for IV abx.     Bone biopsy NGTD.      Soft tissue swabs with polymicrobial infection    Vascular surgery consult pending following JEYSON results     Podiatry will follow.     Once hospital meds and feels that patient is optimized, she would benefit from surgical debridement plus or minus application of wound VAC or skin substitute.     Continue Q shift dressing changes with Vashe wet to dry.      Shoes ordered to be worn with all ambulation    Podiatry will continue to follow.    Maira De Los Santos DPM  Podiatry  [facility] - Telemetry

## 2022-05-08 NOTE — ASSESSMENT & PLAN NOTE
Long history of bipolar 1 disorder with recent psychosis at last hospital stay.  Carbamazepine has caused hyponatremia.  - sister Anitra states patient has not been compliant with her medications and feels that she still has some paranoia that her stepdaughter had been hiding her medications though she believes it was the patient herself.  - Psych consulted- OK to continue current regimen of risperidone and depakoate minus carbamazepine  - VPA level on Monday (ordered)

## 2022-05-09 ENCOUNTER — ANESTHESIA EVENT (OUTPATIENT)
Dept: SURGERY | Facility: HOSPITAL | Age: 52
DRG: 854 | End: 2022-05-09
Payer: MEDICAID

## 2022-05-09 LAB
ANION GAP SERPL CALC-SCNC: 8 MMOL/L (ref 8–16)
BASOPHILS # BLD AUTO: 0.08 K/UL (ref 0–0.2)
BASOPHILS NFR BLD: 0.6 % (ref 0–1.9)
BUN SERPL-MCNC: 10 MG/DL (ref 6–20)
CALCIUM SERPL-MCNC: 8.6 MG/DL (ref 8.7–10.5)
CHLORIDE SERPL-SCNC: 99 MMOL/L (ref 95–110)
CO2 SERPL-SCNC: 24 MMOL/L (ref 23–29)
CREAT SERPL-MCNC: 0.7 MG/DL (ref 0.5–1.4)
DIFFERENTIAL METHOD: ABNORMAL
EOSINOPHIL # BLD AUTO: 0.4 K/UL (ref 0–0.5)
EOSINOPHIL NFR BLD: 2.6 % (ref 0–8)
ERYTHROCYTE [DISTWIDTH] IN BLOOD BY AUTOMATED COUNT: 17.6 % (ref 11.5–14.5)
EST. GFR  (AFRICAN AMERICAN): >60 ML/MIN/1.73 M^2
EST. GFR  (NON AFRICAN AMERICAN): >60 ML/MIN/1.73 M^2
GLUCOSE SERPL-MCNC: 153 MG/DL (ref 70–110)
HCT VFR BLD AUTO: 23.8 % (ref 37–48.5)
HGB BLD-MCNC: 7.7 G/DL (ref 12–16)
IMM GRANULOCYTES # BLD AUTO: 0.65 K/UL (ref 0–0.04)
IMM GRANULOCYTES NFR BLD AUTO: 4.8 % (ref 0–0.5)
LYMPHOCYTES # BLD AUTO: 3.2 K/UL (ref 1–4.8)
LYMPHOCYTES NFR BLD: 23.6 % (ref 18–48)
MAGNESIUM SERPL-MCNC: 1.7 MG/DL (ref 1.6–2.6)
MCH RBC QN AUTO: 27.6 PG (ref 27–31)
MCHC RBC AUTO-ENTMCNC: 32.4 G/DL (ref 32–36)
MCV RBC AUTO: 85 FL (ref 82–98)
MONOCYTES # BLD AUTO: 1.8 K/UL (ref 0.3–1)
MONOCYTES NFR BLD: 13.5 % (ref 4–15)
NEUTROPHILS # BLD AUTO: 7.4 K/UL (ref 1.8–7.7)
NEUTROPHILS NFR BLD: 54.9 % (ref 38–73)
NRBC BLD-RTO: 0 /100 WBC
PLATELET # BLD AUTO: 655 K/UL (ref 150–450)
PMV BLD AUTO: 8.9 FL (ref 9.2–12.9)
POCT GLUCOSE: 146 MG/DL (ref 70–110)
POCT GLUCOSE: 171 MG/DL (ref 70–110)
POCT GLUCOSE: 246 MG/DL (ref 70–110)
POCT GLUCOSE: 299 MG/DL (ref 70–110)
POTASSIUM SERPL-SCNC: 5.6 MMOL/L (ref 3.5–5.1)
RBC # BLD AUTO: 2.79 M/UL (ref 4–5.4)
SODIUM SERPL-SCNC: 131 MMOL/L (ref 136–145)
VALPROATE SERPL-MCNC: 35.6 UG/ML (ref 50–100)
WBC # BLD AUTO: 13.41 K/UL (ref 3.9–12.7)

## 2022-05-09 PROCEDURE — 25000003 PHARM REV CODE 250: Performed by: INTERNAL MEDICINE

## 2022-05-09 PROCEDURE — 83735 ASSAY OF MAGNESIUM: CPT | Performed by: HOSPITALIST

## 2022-05-09 PROCEDURE — 27000207 HC ISOLATION

## 2022-05-09 PROCEDURE — 94761 N-INVAS EAR/PLS OXIMETRY MLT: CPT

## 2022-05-09 PROCEDURE — 99223 1ST HOSP IP/OBS HIGH 75: CPT | Mod: ,,, | Performed by: SURGERY

## 2022-05-09 PROCEDURE — 36415 COLL VENOUS BLD VENIPUNCTURE: CPT | Performed by: INTERNAL MEDICINE

## 2022-05-09 PROCEDURE — 21400001 HC TELEMETRY ROOM

## 2022-05-09 PROCEDURE — 99232 SBSQ HOSP IP/OBS MODERATE 35: CPT | Mod: ,,, | Performed by: INTERNAL MEDICINE

## 2022-05-09 PROCEDURE — S0030 INJECTION, METRONIDAZOLE: HCPCS | Performed by: STUDENT IN AN ORGANIZED HEALTH CARE EDUCATION/TRAINING PROGRAM

## 2022-05-09 PROCEDURE — 82088 ASSAY OF ALDOSTERONE: CPT | Performed by: INTERNAL MEDICINE

## 2022-05-09 PROCEDURE — 63600175 PHARM REV CODE 636 W HCPCS: Performed by: EMERGENCY MEDICINE

## 2022-05-09 PROCEDURE — 94640 AIRWAY INHALATION TREATMENT: CPT

## 2022-05-09 PROCEDURE — 25000003 PHARM REV CODE 250: Performed by: STUDENT IN AN ORGANIZED HEALTH CARE EDUCATION/TRAINING PROGRAM

## 2022-05-09 PROCEDURE — 99223 PR INITIAL HOSPITAL CARE,LEVL III: ICD-10-PCS | Mod: ,,, | Performed by: SURGERY

## 2022-05-09 PROCEDURE — 80048 BASIC METABOLIC PNL TOTAL CA: CPT | Performed by: INTERNAL MEDICINE

## 2022-05-09 PROCEDURE — 99232 SBSQ HOSP IP/OBS MODERATE 35: CPT | Mod: ,,, | Performed by: PODIATRIST

## 2022-05-09 PROCEDURE — 80164 ASSAY DIPROPYLACETIC ACD TOT: CPT | Performed by: HOSPITALIST

## 2022-05-09 PROCEDURE — 99232 PR SUBSEQUENT HOSPITAL CARE,LEVL II: ICD-10-PCS | Mod: ,,, | Performed by: PODIATRIST

## 2022-05-09 PROCEDURE — 25000003 PHARM REV CODE 250: Performed by: HOSPITALIST

## 2022-05-09 PROCEDURE — 99232 PR SUBSEQUENT HOSPITAL CARE,LEVL II: ICD-10-PCS | Mod: ,,, | Performed by: INTERNAL MEDICINE

## 2022-05-09 PROCEDURE — 85025 COMPLETE CBC W/AUTO DIFF WBC: CPT | Performed by: HOSPITALIST

## 2022-05-09 PROCEDURE — 25000003 PHARM REV CODE 250: Performed by: EMERGENCY MEDICINE

## 2022-05-09 RX ORDER — FLUDROCORTISONE ACETATE 0.1 MG/1
100 TABLET ORAL DAILY
Status: DISCONTINUED | OUTPATIENT
Start: 2022-05-09 | End: 2022-05-13

## 2022-05-09 RX ADMIN — CETIRIZINE HYDROCHLORIDE 5 MG: 5 TABLET ORAL at 09:05

## 2022-05-09 RX ADMIN — Medication 1 G: at 09:05

## 2022-05-09 RX ADMIN — CIPROFLOXACIN 500 MG: 500 TABLET, FILM COATED ORAL at 09:05

## 2022-05-09 RX ADMIN — OXYCODONE AND ACETAMINOPHEN 1 TABLET: 7.5; 325 TABLET ORAL at 12:05

## 2022-05-09 RX ADMIN — POLYETHYLENE GLYCOL 3350 17 G: 17 POWDER, FOR SOLUTION ORAL at 09:05

## 2022-05-09 RX ADMIN — RISPERIDONE 2 MG: 1 TABLET ORAL at 09:05

## 2022-05-09 RX ADMIN — METRONIDAZOLE 500 MG: 500 INJECTION, SOLUTION INTRAVENOUS at 03:05

## 2022-05-09 RX ADMIN — HYDROXYZINE PAMOATE 50 MG: 25 CAPSULE ORAL at 03:05

## 2022-05-09 RX ADMIN — Medication 1 G: at 12:05

## 2022-05-09 RX ADMIN — HYDROXYZINE PAMOATE 50 MG: 25 CAPSULE ORAL at 09:05

## 2022-05-09 RX ADMIN — METOPROLOL TARTRATE 50 MG: 50 TABLET, FILM COATED ORAL at 09:05

## 2022-05-09 RX ADMIN — OXYCODONE AND ACETAMINOPHEN 1 TABLET: 7.5; 325 TABLET ORAL at 03:05

## 2022-05-09 RX ADMIN — SENNOSIDES AND DOCUSATE SODIUM 2 TABLET: 50; 8.6 TABLET ORAL at 09:05

## 2022-05-09 RX ADMIN — VANCOMYCIN HYDROCHLORIDE 1500 MG: 1.5 INJECTION, POWDER, LYOPHILIZED, FOR SOLUTION INTRAVENOUS at 09:05

## 2022-05-09 RX ADMIN — Medication 1 G: at 05:05

## 2022-05-09 RX ADMIN — METRONIDAZOLE 500 MG: 500 INJECTION, SOLUTION INTRAVENOUS at 09:05

## 2022-05-09 RX ADMIN — INSULIN ASPART 3 UNITS: 100 INJECTION, SOLUTION INTRAVENOUS; SUBCUTANEOUS at 05:05

## 2022-05-09 RX ADMIN — APIXABAN 5 MG: 5 TABLET, FILM COATED ORAL at 09:05

## 2022-05-09 RX ADMIN — ACETAMINOPHEN 1000 MG: 500 TABLET ORAL at 07:05

## 2022-05-09 RX ADMIN — PANTOPRAZOLE SODIUM 40 MG: 40 TABLET, DELAYED RELEASE ORAL at 09:05

## 2022-05-09 RX ADMIN — RISPERIDONE 3 MG: 1 TABLET ORAL at 09:05

## 2022-05-09 RX ADMIN — INSULIN ASPART 1 UNITS: 100 INJECTION, SOLUTION INTRAVENOUS; SUBCUTANEOUS at 09:05

## 2022-05-09 RX ADMIN — ASPIRIN 81 MG CHEWABLE TABLET 81 MG: 81 TABLET CHEWABLE at 09:05

## 2022-05-09 RX ADMIN — DIVALPROEX SODIUM 1250 MG: 250 TABLET, DELAYED RELEASE ORAL at 09:05

## 2022-05-09 RX ADMIN — FLUTICASONE FUROATE AND VILANTEROL TRIFENATATE 1 PUFF: 100; 25 POWDER RESPIRATORY (INHALATION) at 08:05

## 2022-05-09 RX ADMIN — VANCOMYCIN HYDROCHLORIDE 1500 MG: 1.5 INJECTION, POWDER, LYOPHILIZED, FOR SOLUTION INTRAVENOUS at 11:05

## 2022-05-09 RX ADMIN — FLUDROCORTISONE ACETATE 100 MCG: 0.1 TABLET ORAL at 12:05

## 2022-05-09 RX ADMIN — PRAVASTATIN SODIUM 40 MG: 40 TABLET ORAL at 09:05

## 2022-05-09 RX ADMIN — SODIUM ZIRCONIUM CYCLOSILICATE 5 G: 5 POWDER, FOR SUSPENSION ORAL at 09:05

## 2022-05-09 RX ADMIN — DIVALPROEX SODIUM 500 MG: 250 TABLET, DELAYED RELEASE ORAL at 09:05

## 2022-05-09 RX ADMIN — METRONIDAZOLE 500 MG: 500 INJECTION, SOLUTION INTRAVENOUS at 05:05

## 2022-05-09 NOTE — PLAN OF CARE
05/09/22 1622   Discharge Reassessment   Assessment Type Discharge Planning Reassessment   Did the patient's condition or plan change since previous assessment? No   Discharge Plan discussed with: Patient   Communicated HUMBERTO with patient/caregiver Yes   Discharge Plan A Long-term acute care facility (LTAC)   Discharge Plan B Skilled Nursing Facility   DME Needed Upon Discharge  none   Discharge Barriers Identified Mental illness   Why the patient remains in the hospital Requires continued medical care   Post-Acute Status   Post-Acute Authorization Placement   LTAC placement search expanded to [place] due to no acceptance.  [facility] is interested but needs 3 in network denials.  [facility] and [facility] denied.  [facility] out-of-network.  [facility] no response.

## 2022-05-09 NOTE — SUBJECTIVE & OBJECTIVE
Facility-Administered Medications Prior to Admission   Medication Dose Route Frequency Provider Last Rate Last Admin    LIDOcaine HCL 10 mg/ml (1%) injection 1 mL  1 mL Other 1 time in Clinic/HOD Tone Mata MD        LIDOcaine-EPINEPHrine 1%-1:100,000 30 mL, LIDOcaine HCL 10 mg/ml (1%) 20 mL, sodium bicarbonate 10 mL in sodium chloride 0.9% 500 mL solution   MISCELLANEOUS 1 time in Clinic/HOD Tone Mata MD         Medications Prior to Admission   Medication Sig Dispense Refill Last Dose    acetaminophen (TYLENOL) 500 MG tablet Take 2 tablets (1,000 mg total) by mouth every 6 (six) hours as needed for Pain. 30 tablet 0     albuterol (PROVENTIL/VENTOLIN HFA) 90 mcg/actuation inhaler Inhale 2 puffs into the lungs every 6 (six) hours as needed for Wheezing. Use with spacer  Dispense with 1 spacer 18 g 0 2022 at Unknown time    albuterol-ipratropium (DUO-NEB) 2.5 mg-0.5 mg/3 mL nebulizer solution Take 3 mLs by nebulization every 6 (six) hours as needed for Wheezing or Shortness of Breath. Rescue 1 Box 0 2022 at Unknown time    apixaban (ELIQUIS) 5 mg Tab Take 1 tablet (5 mg total) by mouth 2 (two) times daily. 30 tablet 3 2022 at Unknown time    aspirin 81 MG Chew Take 1 tablet (81 mg total) by mouth once daily. 30 tablet 11 2022 at Unknown time    dicyclomine (BENTYL) 20 mg tablet Take 1 tablet (20 mg total) by mouth every 6 (six) hours. 30 tablet 0 2022 at Unknown time    divalproex (DEPAKOTE) 250 MG EC tablet Take 5 tablets (1,250 mg total) by mouth every evening. 150 tablet 11 5/3/2022 at Unknown time    divalproex (DEPAKOTE) 500 MG TbEC Take 1 tablet (500 mg total) by mouth once daily. PO QAM 30 tablet 11 2022 at Unknown time    [] doxycycline (VIBRAMYCIN) 100 MG Cap Take 1 capsule (100 mg total) by mouth every 12 (twelve) hours. for 8 days 16 capsule 0 2022 at Unknown time    DUPIXENT  mg/2 mL PnIj SMARTSI Milligram(s) SUB-Q Every 2 Weeks        EPITOL 200 mg tablet Take 200 mg by mouth 2 (two) times a day.   2022 at Unknown time    famotidine (PEPCID) 20 MG tablet Take 20 mg by mouth 2 (two) times daily.   2022 at Unknown time    fluticasone propionate (FLONASE) 50 mcg/actuation nasal spray 1 spray (50 mcg total) by Each Nostril route 2 (two) times daily. 16 g 0 2022 at Unknown time    furosemide (LASIX) 20 MG tablet TAKE 1 TABLET(20 MG) BY MOUTH EVERY DAY 90 tablet 1 2022 at Unknown time    gabapentin (NEURONTIN) 300 MG capsule TAKE 2 CAPSULES(600 MG) BY MOUTH TWICE DAILY 120 capsule 2 2022 at Unknown time    hydrOXYzine (ATARAX) 50 MG tablet Take 50 mg by mouth 4 (four) times daily as needed.   2022 at Unknown time    ibuprofen (ADVIL,MOTRIN) 600 MG tablet TAKE 1 TABLET(600 MG) BY MOUTH EVERY 8 HOURS AS NEEDED FOR PAIN OR BACK PAIN 90 tablet 0     lisinopriL 10 MG tablet Take 1 tablet (10 mg total) by mouth once daily. 90 tablet 3 2022 at Unknown time    loratadine (CLARITIN) 10 mg tablet Take 1 tablet (10 mg total) by mouth once daily. 60 tablet 0     magnesium oxide (MAG-OX) 400 mg (241.3 mg magnesium) tablet Take 1 tablet (400 mg total) by mouth 2 (two) times daily. 30 tablet 3 5/3/2022 at Unknown time    metFORMIN (GLUCOPHAGE) 1000 MG tablet TAKE 1 TABLET(1000 MG) BY MOUTH TWICE DAILY WITH MEALS 180 tablet 2 2022 at Unknown time    metoprolol tartrate (LOPRESSOR) 50 MG tablet TAKE 1 TABLET(50 MG) BY MOUTH TWICE DAILY 180 tablet 2 2022 at Unknown time    [] metroNIDAZOLE (FLAGYL) 500 MG tablet Take 1 tablet (500 mg total) by mouth every 8 (eight) hours. for 8 days 24 tablet 0 2022 at Unknown time    OLANZapine (ZYPREXA) 10 MG tablet Take 1 tablet (10 mg total) by mouth every 8 (eight) hours as needed (Agitation). 30 tablet 11 2022 at Unknown time    [facility] Inhale into the lungs.   2022 at Unknown time    pantoprazole (PROTONIX) 40 MG tablet Take 1 tablet (40 mg total) by  mouth once daily. 30 tablet 0 5/4/2022 at Unknown time    polyvinyl alcohol, artificial tears, (LIQUIFILM TEARS) 1.4 % ophthalmic solution Place 1 drop into both eyes 4 (four) times daily. 15 mL 2 5/4/2022 at Unknown time    pravastatin (PRAVACHOL) 40 MG tablet TAKE 1 TABLET(40 MG) BY MOUTH EVERY EVENING 90 tablet 3 5/3/2022 at Unknown time    risperiDONE (RISPERDAL M-TABS) 3 MG disintegrating tablet Take 1 tablet (3 mg total) by mouth 2 (two) times daily. 60 tablet 11 5/4/2022 at Unknown time    senna (SENOKOT) 8.6 mg tablet Take 1 tablet by mouth 2 (two) times a day. 60 tablet 2 5/4/2022 at Unknown time    blood sugar diagnostic Strp To check BG two  times daily, to use with insurance preferred meter (Patient taking differently: To check BG two  times daily, to use with insurance preferred meter) 100 each 1     blood-glucose meter kit To check BG once daily, to use with insurance preferred meter 1 each 0     blood-glucose meter kit To check BG two times daily, to use with insurance preferred meter 1 each 0     fluticasone-salmeterol diskus inhaler 250-50 mcg Inhale 1 puff into the lungs 2 (two) times daily. Controller 60 each 2 Unknown at Unknown time    hydrOXYzine pamoate (VISTARIL) 50 MG Cap Take 1 capsule (50 mg total) by mouth 3 (three) times daily. 90 capsule 2     lancets Misc To check BG two times daily, to use with insurance preferred meter 100 each 1     multivitamin Tab Take 1 tablet by mouth once daily. 30 tablet 2 Unknown at Unknown time    TRUE METRIX GLUCOSE METER Misc CHECK BLOOD SUGAR TWICE DAILY 1 each 0        Review of patient's allergies indicates:   Allergen Reactions    Morphine Other (See Comments)     Patient had a psychotic episode after taking Morphine  Agitation, hallucinations    Penicillins Anaphylaxis     itching    Januvia [sitagliptin] Hives    Carbamazepine Other (See Comments)     hyponatremia       Past Medical History:   Diagnosis Date    ADHD (attention deficit hyperactivity  "disorder)     Arthritis     Asthma     Bipolar 1 disorder     Cataract     Cigarette smoker     COPD (chronic obstructive pulmonary disease)     Coronary artery disease     A fib    Depression     bipolar manic depresson    Diabetes mellitus     Diabetic foot ulcers     Diabetic neuropathy     DVT of lower extremity, bilateral 07/2013    bilateral LE DVT. Muir filter placed.     Encounter for blood transfusion     History of blood clots 1. Left Leg=2003; 2.Bilateral Groin=Blood Clots= 5 or 6/ 2013 & 7/2013; 3. LLL of Lung=7/2013;  4. Lt. Lower Leg=7/2013.     Pt. had 1st Blood Clot after [id number], & Last=2013. Estelita Filter= Rt.Lateral Neck.    HTN (hypertension) 06/06/2013    Pt states that she does not have hypertension    Hypercholesteremia     Irregular heartbeat     Neuromuscular disorder     neuropathy feet    Obese     PE (pulmonary embolism) 07/2013    bilat LE DVT.     Restless leg syndrome      Past Surgical History:   Procedure Laterality Date    ABDOMINAL SURGERY  2010    gastric sleeve    BILATERAL OOPHORECTOMY Bilateral 1/12/2015    CHOLECYSTECTOMY      Green' s filter Right 7/4/2012    Right Neck & Tunneled Down.    HERNIA REPAIR      "[place] [facility] Hern[facility] Repaires around th Belly Button.", pt. states    LAPAROSCOPIC CHOLECYSTECTOMY N/A 9/10/2020    Procedure: CHOLECYSTECTOMY, LAPAROSCOPIC;  Surgeon: Montrell Gutierrez MD;  Location: [facility] OR;  Service: General;  Laterality: N/A;  RN PREOP 9/9----COVID Negative  9/9    OVARIAN CYST REMOVAL  3/13/2014    PA REMOVAL OF OVARY/TUBE(S)      SPLENECTOMY, TOTAL  July 2003    TONSILLECTOMY      as a child    TYMPANOSTOMY TUBE PLACEMENT  1976    VEIN SURGERY  2003    Lt leg     Family History       Problem Relation (Age of Onset)    Cataracts Father    Diabetes Father, Paternal Grandfather    Heart disease Father, Paternal Grandfather    Hypertension Father    No Known Problems Mother, Sister, Brother, Maternal Aunt, Maternal Uncle, Paternal Aunt, " Paternal Uncle, Maternal Grandfather    Ovarian cancer Maternal Grandmother, Paternal Grandmother          Tobacco Use    Smoking status: Current Every Day Smoker     Packs/day: 1.00     Years: 37.00     Pack years: 37.00     Types: Cigarettes     Last attempt to quit: 2020     Years since quittin.4    Smokeless tobacco: Never Used    Tobacco comment: Enrolled in the [employer] on 5/3/14 ([facility] Member ID # [id number]). Ambulatory referral to Smoking Cessation Program   Substance and Sexual Activity    Alcohol use: No     Alcohol/week: 0.0 standard drinks    Drug use: No    Sexual activity: Yes     Partners: Male     Review of Systems   Constitutional:  Negative for chills.   HENT:  Negative for congestion.    Eyes:  Negative for visual disturbance.   Respiratory:  Negative for shortness of breath.    Cardiovascular:  Negative for chest pain.   Gastrointestinal:  Negative for abdominal distention.   Endocrine: Negative for cold intolerance.   Genitourinary:  Negative for flank pain.   Musculoskeletal:  Negative for back pain.   Skin:  Negative for pallor and rash.   Allergic/Immunologic: Negative for immunocompromised state.   Neurological:  Negative for dizziness.   Hematological:  Does not bruise/bleed easily.   Psychiatric/Behavioral:  Negative for agitation.    Objective:     Vital Signs (Most Recent):  Temp: 98.2 °F (36.8 °C) (22)  Pulse: 83 (22)  Resp: 20 (22)  BP: (!) 170/77 (22)  SpO2: 98 % (22)   Vital Signs (24h Range):  Temp:  [98.1 °F (36.7 °C)-100 °F (37.8 °C)] 98.2 °F (36.8 °C)  Pulse:  [61-90] 83  Resp:  [17-20] 20  SpO2:  [96 %-99 %] 98 %  BP: (139-175)/(58-77) 170/77     Weight: 118.5 kg (261 lb 3.9 oz)  Body mass index is 42.17 kg/m².    Physical Exam  Vitals reviewed.   Constitutional:       General: She is not in acute distress.     Appearance: She is well-developed. She is not diaphoretic.   HENT:      Head: Normocephalic and  atraumatic.   Eyes:      Conjunctiva/sclera: Conjunctivae normal.   Cardiovascular:      Rate and Rhythm: Normal rate.      Pulses:           Femoral pulses are 2+ on the right side and 2+ on the left side.       Dorsalis pedis pulses are detected w/ Doppler on the right side and detected w/ Doppler on the left side.        Posterior tibial pulses are detected w/ Doppler on the right side and detected w/ Doppler on the left side.   Pulmonary:      Effort: Pulmonary effort is normal.   Abdominal:      General: There is no distension.      Palpations: Abdomen is soft. There is no mass.      Tenderness: There is no abdominal tenderness. There is no guarding or rebound.      Hernia: No hernia is present.   Musculoskeletal:         General: No deformity. Normal range of motion.      Cervical back: Neck supple.   Feet:      Right foot:      Skin integrity: Ulcer present.      Left foot:      Skin integrity: Ulcer present.   Skin:     Findings: No rash.   Neurological:      Mental Status: She is alert and oriented to person, place, and time.       Significant Labs:  All pertinent labs from the last 24 hours have been reviewed.    Significant Diagnostics:  I have reviewed all pertinent imaging results/findings within the past 24 hours.

## 2022-05-09 NOTE — PROGRESS NOTES
[facility]  Podiatry  Progress Note    Patient Name: Audrey Natarajan  MRN: [id number]  Admission Date: 5/4/2022  Hospital Length of Stay: 5 days  Attending Physician: Keyona Darden MD  Primary Care Provider: Donaldo Pena MD     Subjective:     History of Present Illness: 50 y/o female PMH DM2, bipolar admitted for wound infection B/L. Patient recently discharged from [facility] on 4/26/22. Patient reports unable to take medications as family member was hiding meds from her. Seen earlier today in podiatry clinic Dr. De Los Santos, sent to ED for admission.     5/6/22: Patient seen bedside. Bandages intact B/L    5/8/2022 patient seen at bedside resting comfortably.  Dressing somewhat disheveled because she relates that she walks on floor without any type of shoe.  She relates feeling overall better.  No new pedal complaints.    5/9/22: Patient seen bedside. Per nurse patient declined heel protector boots. Only has one DARCO shoe at bedside. Contacted nurse to order additional DARCO shoe.     Scheduled Meds:   apixaban  5 mg Oral BID    aspirin  81 mg Oral Daily    cetirizine  5 mg Oral Daily    ciprofloxacin HCl  500 mg Oral Q12H    divalproex  1,250 mg Oral QHS    divalproex  500 mg Oral Daily    fludrocortisone  100 mcg Oral Daily    fluticasone furoate-vilanteroL  1 puff Inhalation Daily    fluticasone propionate  2 spray Each Nostril Daily    hydrOXYzine pamoate  50 mg Oral TID    metoprolol tartrate  50 mg Oral BID    metronidazole  500 mg Intravenous Q8H    pantoprazole  40 mg Oral Daily    polyethylene glycol  17 g Oral Daily    pravastatin  40 mg Oral QHS    risperiDONE  2 mg Oral Daily    risperiDONE  3 mg Oral QHS    senna-docusate 8.6-50 mg  2 tablet Oral BID    sodium chloride  1 g Oral QID    sodium zirconium cyclosilicate  5 g Oral Daily    vancomycin (VANCOCIN) IVPB  1,500 mg Intravenous Q12H     Continuous Infusions:    PRN Meds:sodium chloride, acetaminophen,  "dextrose 10%, dextrose 10%, glucagon (human recombinant), glucagon (human recombinant), glucose, glucose, insulin aspart U-100, naloxone, naloxone, OLANZapine, oxyCODONE-acetaminophen, sodium chloride 0.9%, sodium chloride 0.9%, Pharmacy to dose Vancomycin consult **AND** vancomycin - pharmacy to dose    Review of patient's allergies indicates:   Allergen Reactions    Morphine Other (See Comments)     Patient had a psychotic episode after taking Morphine  Agitation, hallucinations    Penicillins Anaphylaxis     itching    Januvia [sitagliptin] Hives    Carbamazepine Other (See Comments)     hyponatremia        Past Medical History:   Diagnosis Date    ADHD (attention deficit hyperactivity disorder)     Arthritis     Asthma     Bipolar 1 disorder     Cataract     Cigarette smoker     COPD (chronic obstructive pulmonary disease)     Coronary artery disease     A fib    Depression     bipolar manic depresson    Diabetes mellitus     Diabetic foot ulcers     Diabetic neuropathy     DVT of lower extremity, bilateral 07/2013    bilateral LE DVT. [place] filter placed.     Encounter for blood transfusion     History of blood clots 1. Left Leg=2003; 2.Bilateral Groin=Blood Clots= 5 or 6/ 2013 & 7/2013; 3. LLL of Lung=7/2013;  4. Lt. Lower Leg=7/2013.     Pt. had 1st Blood Clot after Rixkgaiccwxg=7212, & Last=2013. [place] Filter= Rt.Lateral Neck.    HTN (hypertension) 06/06/2013    Pt states that she does not have hypertension    Hypercholesteremia     Irregular heartbeat     Neuromuscular disorder     neuropathy feet    Obese     PE (pulmonary embolism) 07/2013    bilat LE DVT.     Restless leg syndrome      Past Surgical History:   Procedure Laterality Date    ABDOMINAL SURGERY  2010    gastric sleeve    BILATERAL OOPHORECTOMY Bilateral 1/12/2015    CHOLECYSTECTOMY      Green' s filter Right 7/4/2012    Right Neck & Tunneled Down.    HERNIA REPAIR      "[place] [employer] around " "th [place] pt. states    LAPAROSCOPIC CHOLECYSTECTOMY N/A 9/10/2020    Procedure: CHOLECYSTECTOMY, LAPAROSCOPIC;  Surgeon: Montrell Gutierrez MD;  Location: [facility];  Service: General;  Laterality: N/A;  RN PREOP ----COVID Negative      OVARIAN CYST REMOVAL  3/13/2014    MS REMOVAL OF OVARY/TUBE(S)      SPLENECTOMY, TOTAL  2003    TONSILLECTOMY      as a child    TYMPANOSTOMY TUBE PLACEMENT      VEIN SURGERY      Lt leg       Family History     Problem Relation (Age of Onset)    Cataracts Father    Diabetes Father, Paternal Grandfather    Heart disease Father, Paternal Grandfather    Hypertension Father    No Known Problems Mother, Sister, Brother, Maternal Aunt, Maternal Uncle, Paternal Aunt, Paternal Uncle, Maternal Grandfather    Ovarian cancer Maternal Grandmother, Paternal Grandmother        Tobacco Use    Smoking status: Current Every Day Smoker     Packs/day: 1.00     Years: 37.00     Pack years: 37.00     Types: Cigarettes     Last attempt to quit: 2020     Years since quittin.4    Smokeless tobacco: Never Used    Tobacco comment: Enrolled in the [email or web address] on 5/3/14 ([facility] Member ID # [id number]). Ambulatory referral to Smoking Cessation Program   Substance and Sexual Activity    Alcohol use: No     Alcohol/week: 0.0 standard drinks    Drug use: No    Sexual activity: Yes     Partners: Male     Review of Systems   Constitutional: Negative for activity change, appetite change, chills, fatigue and fever.   Respiratory: Negative for cough and shortness of breath.    Cardiovascular: Positive for leg swelling. Negative for chest pain.   Gastrointestinal: Negative for diarrhea, nausea and vomiting.   Musculoskeletal: Positive for arthralgias and myalgias.   Skin: Positive for wound.   Neurological: Positive for numbness. Negative for weakness.        + paresthesia    Psychiatric/Behavioral: The patient is nervous/anxious.      Objective:     Vital Signs (Most " Recent):  Temp: 98.6 °F (37 °C) (05/09/22 1236)  Pulse: 68 (05/09/22 1236)  Resp: 18 (05/09/22 1236)  BP: (!) 158/72 (05/09/22 1310)  SpO2: 98 % (05/09/22 1236) Vital Signs (24h Range):  Temp:  [98.1 °F (36.7 °C)-100 °F (37.8 °C)] 98.6 °F (37 °C)  Pulse:  [67-90] 68  Resp:  [17-20] 18  SpO2:  [96 %-99 %] 98 %  BP: (139-175)/(58-77) 158/72     Weight: 118.5 kg (261 lb 3.9 oz)  Body mass index is 42.17 kg/m².    Foot Exam    General  Orientation: alert and oriented to person, place, and time       Right Foot/Ankle     Neurovascular  Dorsalis pedis: 1+  Posterior tibial: 1+  Saphenous nerve sensation: diminished  Tibial nerve sensation: diminished  Superficial peroneal nerve sensation: diminished  Deep peroneal nerve sensation: diminished  Sural nerve sensation: diminished      Left Foot/Ankle      Neurovascular  Dorsalis pedis: 1+  Posterior tibial: 1+  Saphenous nerve sensation: diminished  Tibial nerve sensation: diminished  Superficial peroneal nerve sensation: diminished  Deep peroneal nerve sensation: diminished  Sural nerve sensation: diminished          5/9/22:                05/08/2022 5/6/22:     Wound 1: Left plantar foot   Measurement: 6aah1mrx7.3cm  pre debridement 5.5cmx5.5cmx0.4cm post debridement.  Base: fibrogranular base   Periwound skin: HPK, maceration   Drainage: serous   Erythema: mild  Probe: deep probe         Pre debridement       Post debridement             5/4/22:    Right foot- Epic unable to load image  Right plantar hallux- probe to periosteum fibrotic base  Right plantar forefoot - wound with fibrotic base.   Left foot  Left plantar foot ulceration with necrotic base deep probe to bone        Laboratory:  CBC:   Recent Labs   Lab 05/09/22  0555   WBC 13.41*   RBC 2.79*   HGB 7.7*   HCT 23.8*   *   MCV 85   MCH 27.6   MCHC 32.4     CMP:   Recent Labs   Lab 05/04/22  0944 05/04/22  1814 05/09/22  0555   *   < > 153*   CALCIUM 8.0*   < > 8.6*   ALBUMIN 2.1*   --   --    PROT 6.2  --   --    *   < > 131*   K 5.0   < > 5.6*   CO2 25   < > 24   CL 85*   < > 99   BUN 12   < > 10   CREATININE 0.6   < > 0.7   ALKPHOS 136*  --   --    ALT 14  --   --    AST 18  --   --    BILITOT 0.3  --   --     < > = values in this interval not displayed.       Diagnostic Results:  Xray:  X-Ray Foot Complete Right  Order: [id number]   Status: Final result     Visible to patient: Yes (not seen)     Next appt: 05/09/2022 at 02:00 PM in Gastroenterology (Saloni De Anda MD)     Dx: Infection     0 Result Notes    Details    Reading Physician Reading Date Result Priority   Josr Almanzar MD  [phone] 5/4/2022 STAT     Narrative & Impression  EXAMINATION:  XR FOOT COMPLETE 3 VIEW RIGHT     CLINICAL HISTORY:  . Unspecified infectious disease     TECHNIQUE:  AP, lateral, and oblique views of the right foot were performed.     COMPARISON:  Right foot radiograph 04/20/2022.     FINDINGS:  No definite evidence of acute fracture or dislocation.  Lisfranc joint appears congruent.  There appears to be chronic appearing deformity at the 4th digit metatarsal head and neck with erosive change at the lateral aspect of the head.     Joint spaces appear to be maintained.  There is soft tissue swelling most pronounced at the dorsum of the mid and forefoot.  No definite radiopaque foreign body.  Enthesopathic change at the calcaneus.     Impression:     No definite evidence of acute fracture or dislocation.     MRI: MRI Foot (Forefoot) Right Without Contrast  Order: [id number]   Status: Final result     Visible to patient: Yes (not seen)     Next appt: 05/09/2022 at 02:00 PM in Gastroenterology (Saloni De Anda MD)     Dx: Other acute osteomyelitis, unspecifie...     0 Result Notes    Details    Reading Physician Reading Date Result Priority   Tu Santos Jr., MD  [phone] 5/6/2022 Routine     Narrative & Impression  EXAMINATION:  MRI FOOT (FOREFOOT) RIGHT WITHOUT  CONTRAST     CLINICAL HISTORY:  Osteomyelitis, foot;eval OM, abscess right  plantar forefoot ulceration right hallux;  Other acute osteomyelitis, unspecified ankle and foot     TECHNIQUE:  Noncontrast MRI of the right forefoot     COMPARISON:  X-ray 05/04/2022     FINDINGS:  There are hammertoe deformities of digits 2 through 4.  There is a fracture of the 4th metatarsal head without surrounding marrow edema but with visualization of the fracture line suggesting that it may be subacute to chronic.     There is soft tissue ulceration and skin thickening of the distal aspect of the great toe with subtle high T2 and low T1 signal involving the distal tuft compatible with cellulitis and osteomyelitis.  No focal soft tissue fluid collection.  Diffuse dorsal forefoot soft tissue edema is present and there are chronic denervation changes throughout the muscles.     Impression:     Cellulitis of the forefoot and great toe with osteomyelitis of the distal tuft of the 1st digit     Fracture of the 4th metatarsal neck, possibly subacute to chronic.           MRI: MRI Foot (Midfoot) Left Without Contrast  Order: [id number]   Status: Final result     Visible to patient: Yes (not seen)     Next appt: 05/09/2022 at 02:00 PM in Gastroenterology (Saloni De Anda MD)     Dx: Other acute osteomyelitis, unspecifie...     0 Result Notes    Details    Reading Physician Reading Date Result Priority   Tu Santos Jr., MD  [phone] 5/6/2022 Routine     Narrative & Impression  EXAMINATION:  MRI FOOT (MIDFOOT) LEFT WITHOUT CONTRAST     CLINICAL HISTORY:  Osteomyelitis, foot;R/o abscess, OM left plantar foot ulceration;  Other acute osteomyelitis, unspecified ankle and foot     TECHNIQUE:  Multiplanar multisequence MRI of the left midfoot without intravenous contrast.     COMPARISON:  X-ray 04/20/2022     FINDINGS:  Diffuse advanced destructive arthropathic changes are seen throughout the midfoot with abnormal flatfoot  deformity.  There is marked skin thickening and a large area of soft tissue ulceration at the plantar aspect of the midfoot without localized fluid collection or evidence of a sinus tract extending to the bone.  Findings are associated with disruption the central aspect of the plantar aponeurosis and diffuse high signal within the plantar muscles of the midfoot.  No definite fatty atrophy.     Diffuse forefoot and midfoot soft tissue edema is     Impression:     Midfoot and forefoot cellulitis and plantar soft tissue phlegmon.     Destructive arthropathy of the midfoot most likely related to Charcot changes with superimposed septic arthritis difficult to exclude with certainty given the proximity to the foot ulcer and adjacent inflammatory change.     No drainable abscess.          Arterial US:  Contains abnormal data US Lower Extremity Arteries Bilateral  Order: [id number]   Status: Final result     Visible to patient: Yes (not seen)     Next appt: 05/09/2022 at 02:00 PM in Gastroenterology (Saloni De Anda MD)     Dx: Diabetic ulcer of other part of foot ...     0 Result Notes    Details    Reading Physician Reading Date Result Priority   Samuel Harris MD  [phone]  [phone] 5/6/2022 Routine     Narrative & Impression  EXAMINATION:  US LOWER EXTREMITY ARTERIES BILATERAL     CLINICAL HISTORY:  PAD eval;     TECHNIQUE:  Bilateral lower extremity arterial duplex ultrasound examination performed. Multiple gray scale and color doppler images were obtained in addition to waveform analysis.     COMPARISON:  Left lower extremity arterial Doppler 07/13/2021     FINDINGS:  Right lower extremity     Common femoral artery: 111-cm/sec; triphasic waveforms     Deep femoral artery, proximal: 70-cm/sec; triphasic waveforms     Superficial femoral artery, proximal: 100-cm/sec; triphasic waveforms     Superficial femoral artery, mid portion: 135-cm/sec; triphasic waveforms     Superficial femoral artery, distal: 137-cm/sec;  triphasic waveforms     Popliteal artery, proximal: 83-cm/sec; triphasic waveforms     Popliteal artery, distal: 125-cm/sec; triphasic waveforms     Anterior tibial artery: 121-cm/sec; triphasic waveforms     Posterior tibial artery: 48-cm/sec; triphasic waveforms     Peroneal artery: 48-cm/sec; triphasic waveforms     Dorsalis pedis artery: 133-cm/sec; triphasic waveforms     Left lower extremity     Common femoral artery: 120-cm/sec; triphasic waveforms     Deep femoral artery, proximal: 84-cm/sec; triphasic waveforms     Superficial femoral artery, proximal: 150-cm/sec; triphasic waveforms     Superficial femoral artery, mid portion: 189-cm/sec; triphasic waveforms     Superficial femoral artery, distal: 157-cm/sec; triphasic waveforms     Popliteal artery, proximal: 114-cm/sec; triphasic waveforms     Popliteal artery, distal: 139-cm/sec; triphasic waveforms     Anterior tibial artery: 58-cm/sec; triphasic waveforms     Posterior tibial artery: 61-cm/sec; triphasic waveforms     Peroneal artery: 48-cm/sec; triphasic waveforms     Dorsalis pedis artery: 177-cm/sec; triphasic waveforms     Impression:     1. Sonogram suggest hemodynamically significant stenosis in the left and DPA.  2. Multifocal mild to moderate grade stenoses is suspected throughout the left MIRACLE and, bilateral posterior tibial and peroneal arteries.  This report was flagged in Epic as abnormal.             Clinical Findings:  B/L ulceration probe to periosteum right hallux     Assessment/Plan:     Active Diagnoses:    Diagnosis Date Noted POA    PRINCIPAL PROBLEM:  Sepsis due to methicillin resistant Staphylococcus aureus (MRSA) [A41.02] 04/18/2022 Yes    Constipation [K59.00] 05/08/2022 Yes    Iron deficiency anemia [D50.9] 05/06/2022 Yes    Cellulitis of both feet [L03.115, L03.116] 05/06/2022 Yes    PAD (peripheral artery disease) [I73.9] 05/06/2022 Yes    Bacteremia due to Staphylococcus [R78.81, B95.8]  Yes    Osteomyelitis of right  foot [M86.9]  Yes    Hyponatremia [E87.1] 05/04/2022 Yes    Hyperkalemia [E87.5] 04/21/2022 Yes    Chronic deep vein thrombosis (DVT) of both lower extremities [I82.503] 04/13/2022 Yes    Diabetic foot ulcer associated with type 2 diabetes mellitus [E11.621, L97.509] 04/13/2022 Yes    Long term (current) use of anticoagulants [Z79.01] 09/03/2019 Not Applicable    Severe obesity (BMI >= 40) [E66.01] 08/10/2016 Yes    Thrombocytosis [D75.839] 07/16/2016 Yes    Bipolar 1 disorder [F31.9] 04/13/2015 Yes     Chronic    Diabetic neuropathy [E11.40] 11/28/2014 Yes    COPD (chronic obstructive pulmonary disease) [J44.9] 10/11/2013 Yes     Chronic    Essential hypertension [I10] 06/06/2013 Yes     Chronic      Problems Resolved During this Admission:    Diagnosis Date Noted Date Resolved POA    Chest pain [R07.9]  05/06/2022 Yes    Infection [B99.9]  05/06/2022 Yes    Wound infection [T14.8XXA, L08.9]  05/06/2022 Yes    Anemia [D64.9] 04/13/2022 05/06/2022 Yes    Cellulitis of lower extremity [L03.119] 02/10/2021 05/06/2022 Yes    Hypercoagulable state [D68.59] 09/25/2019 05/06/2022 Yes    Leukocytosis [D72.829] 07/16/2016 05/06/2022 Yes       Discussed two options with patient. Amputation of  Right hallux, L BKA   vs IV abx for six weeks with aggressive wound care for several months with no guarantee of wound resolution.  Patient declines amputation of right hallux, also declines L BKA. Elects for IV abx.     Abx per ID    Soft tissue swabs with polymicrobial infection    Vascular surgery consult pending - discussed case with Dr. Adolfo magana to proceed with debridement B/L feet.     Podiatry will follow.     Discussed case with [facility] medicine- Ok to proceed with OR debridement. Anesthesia consult placed. Tentatively plan for OR debridement tomorrow. Case request placed.     NPO @ midnight.     Discussed case with nurse, order for additional DARCO shoe placed- to be worn with all ambulation.     Per  nurse patient previously declined application of heel protector boots.     Continue Q shift dressing changes with Vashe wet to dry.          Podiatry will continue to follow.    Halle Gill DPM  Podiatry  [facility]

## 2022-05-09 NOTE — PROGRESS NOTES
Audrey Natarajan is a 51 y.o. female patient.    Follow for electrolyte imbalance    No new c/o, comfortable    Scheduled Meds:   apixaban  5 mg Oral BID    aspirin  81 mg Oral Daily    cetirizine  5 mg Oral Daily    ciprofloxacin HCl  500 mg Oral Q12H    divalproex  1,250 mg Oral QHS    divalproex  500 mg Oral Daily    fluticasone furoate-vilanteroL  1 puff Inhalation Daily    fluticasone propionate  2 spray Each Nostril Daily    hydrOXYzine pamoate  50 mg Oral TID    metoprolol tartrate  50 mg Oral BID    metronidazole  500 mg Intravenous Q8H    pantoprazole  40 mg Oral Daily    polyethylene glycol  17 g Oral Daily    pravastatin  40 mg Oral QHS    risperiDONE  2 mg Oral Daily    risperiDONE  3 mg Oral QHS    senna-docusate 8.6-50 mg  2 tablet Oral BID    sodium chloride  1 g Oral QID    sodium zirconium cyclosilicate  5 g Oral Daily    vancomycin (VANCOCIN) IVPB  1,500 mg Intravenous Q12H       Review of patient's allergies indicates:   Allergen Reactions    Morphine Other (See Comments)     Patient had a psychotic episode after taking Morphine  Agitation, hallucinations    Penicillins Anaphylaxis     itching    Januvia [sitagliptin] Hives    Carbamazepine Other (See Comments)     hyponatremia         Vital Signs Range (Last 24H):  Temp:  [98.1 °F (36.7 °C)-100 °F (37.8 °C)]   Pulse:  [67-90]   Resp:  [17-20]   BP: (139-175)/(58-77)   SpO2:  [96 %-99 %]     I & O (Last 24H):    Intake/Output Summary (Last 24 hours) at 5/9/2022 1137  Last data filed at 5/9/2022 0814  Gross per 24 hour   Intake 840 ml   Output --   Net 840 ml           Physical Exam:  General appearance: well developed, well nourished, no distress, morbidly obese  Lungs:  diminished breath sounds bilaterally  Heart: regular rate and rhythm  Abdomen: soft, non-tender non-distented; bowel sounds normal; no masses,  no organomegaly  Extremities: edema (+)    Laboratory:  I have reviewed all pertinent lab results  within the past 24 hours.  CBC:   Recent Labs   Lab 05/09/22  0555   WBC 13.41*   RBC 2.79*   HGB 7.7*   HCT 23.8*   *   MCV 85   MCH 27.6   MCHC 32.4     CMP:   Recent Labs   Lab 05/04/22  0944 05/04/22  1814 05/09/22  0555   *   < > 153*   CALCIUM 8.0*   < > 8.6*   ALBUMIN 2.1*  --   --    PROT 6.2  --   --    *   < > 131*   K 5.0   < > 5.6*   CO2 25   < > 24   CL 85*   < > 99   BUN 12   < > 10   CREATININE 0.6   < > 0.7   ALKPHOS 136*  --   --    ALT 14  --   --    AST 18  --   --    BILITOT 0.3  --   --     < > = values in this interval not displayed.       Imp/Plan    Hyponatremia  Hyperkalemia  DM type 2  Bipolar d/o  Infected diabetic foot ulcers (B)  Iron def. anemia    Add Florinef  CMP, Mg in am  Continue present Rx          Trac T Le  5/9/2022

## 2022-05-09 NOTE — PROGRESS NOTES
Patient is in agreement with SNF as an alternate plan if unable to get [facility] acceptance.    Pasrr completed by CRISTOPHER but requires MD signature.  Locet called in to [facility] @ [phone].  PASSR will be faxed to [facility] at:  [phone] once signed by MD.

## 2022-05-09 NOTE — PLAN OF CARE
Problem: Adult Inpatient Plan of Care  Goal: Plan of Care Review  Outcome: Ongoing, Progressing     Problem: Skin Injury Risk Increased  Goal: Skin Health and Integrity  Outcome: Ongoing, Progressing     Problem: Fall Injury Risk  Goal: Absence of Fall and Fall-Related Injury  Outcome: Ongoing, Progressing     Problem: Diabetes Comorbidity  Goal: Blood Glucose Level Within Targeted Range  Outcome: Ongoing, Progressing     Problem: Fluid and Electrolyte Imbalance (Acute Kidney Injury/Impairment)  Goal: Fluid and Electrolyte Balance  Outcome: Ongoing, Progressing     Problem: Oral Intake Inadequate (Acute Kidney Injury/Impairment)  Goal: Optimal Nutrition Intake  Outcome: Ongoing, Progressing     Problem: Renal Function Impairment (Acute Kidney Injury/Impairment)  Goal: Effective Renal Function  Outcome: Ongoing, Progressing     Problem: Impaired Wound Healing  Goal: Optimal Wound Healing  Outcome: Ongoing, Progressing     Problem: Bariatric Environmental Safety  Goal: Safety Maintained with Care  Outcome: Ongoing, Progressing     Problem: Infection  Goal: Absence of Infection Signs and Symptoms  Outcome: Ongoing, Progressing

## 2022-05-09 NOTE — SUBJECTIVE & OBJECTIVE
Interval History: Feeling well today. No complaints.     Review of Systems   Constitutional:  Negative for chills and fever.   Respiratory:  Negative for shortness of breath.    Cardiovascular:  Positive for leg swelling. Negative for chest pain.   Gastrointestinal:  Negative for abdominal pain, constipation, diarrhea, nausea and vomiting.   Genitourinary:  Negative for difficulty urinating.   Musculoskeletal:  Negative for arthralgias, back pain and myalgias.   Skin:  Positive for wound.   Neurological:  Positive for weakness and numbness.   Psychiatric/Behavioral:  Negative for confusion.    Objective:     Vital Signs (Most Recent):  Temp: 98.2 °F (36.8 °C) (05/09/22 0814)  Pulse: 83 (05/09/22 0814)  Resp: 20 (05/09/22 0814)  BP: (!) 170/77 (05/09/22 0814)  SpO2: 98 % (05/09/22 0814) Vital Signs (24h Range):  Temp:  [98.1 °F (36.7 °C)-100 °F (37.8 °C)] 98.2 °F (36.8 °C)  Pulse:  [67-90] 83  Resp:  [17-20] 20  SpO2:  [96 %-99 %] 98 %  BP: (139-175)/(58-77) 170/77     Weight: 118.5 kg (261 lb 3.9 oz)  Body mass index is 42.17 kg/m².    Intake/Output Summary (Last 24 hours) at 5/9/2022 1214  Last data filed at 5/9/2022 0814  Gross per 24 hour   Intake 840 ml   Output --   Net 840 ml        Physical Exam  Vitals and nursing note reviewed.   Constitutional:       General: She is not in acute distress.     Appearance: She is obese. She is not ill-appearing or toxic-appearing.   HENT:      Head: Normocephalic and atraumatic.      Nose: Nose normal.      Mouth/Throat:      Mouth: Mucous membranes are moist.   Cardiovascular:      Rate and Rhythm: Normal rate and regular rhythm.      Heart sounds: Normal heart sounds. No murmur heard.    No gallop.      Comments: Unable to palpate pedal pulses due to dressings  Pulmonary:      Effort: Pulmonary effort is normal. No respiratory distress.      Breath sounds: Normal breath sounds. No wheezing or rales.      Comments: Room air  Abdominal:      General: Bowel sounds are normal.  There is no distension.      Palpations: Abdomen is soft.      Tenderness: There is no abdominal tenderness. There is no guarding.   Musculoskeletal:      Right lower leg: Edema present.      Left lower leg: Edema present.   Skin:     General: Skin is warm and dry.      Comments: Feet are bandaged   Neurological:      Mental Status: She is alert and oriented to person, place, and time.       Significant Labs: All pertinent labs within the past 24 hours have been reviewed.    Significant Imaging: I have reviewed all pertinent imaging results/findings within the past 24 hours.

## 2022-05-09 NOTE — PROGRESS NOTES
Pharmacokinetic Assessment Follow Up: IV Vancomycin    Vancomycin serum concentration assessment(s):    The trough level was drawn correctly and can be used to guide therapy at this time. The measurement is within the desired definitive target range of 10 to 20 mcg/mL.    Vancomycin Regimen Plan:    Due to trough result of 19.6 on the upper limit of target range, Change regimen to Vancomycin 1500 mg IV every 12 hours with next serum trough concentration measured at 09:30 prior to third dose on 5/10/22    Drug levels (last 3 results):  Recent Labs   Lab Result Units 05/06/22  2152 05/08/22  2126   Vancomycin-Trough ug/mL 16.4 19.6       Pharmacy will continue to follow and monitor vancomycin.    Please contact pharmacy at extension [phone] for questions regarding this assessment.    Thank you for the consult,   Maria E Lozano       Patient brief summary:  Audrey Natarajan is a 51 y.o. female initiated on antimicrobial therapy with IV Vancomycin for treatment of skin & soft tissue infection    The patient's current regimen is Vancomycin 1500 mg IV q12h    Drug Allergies:   Review of patient's allergies indicates:   Allergen Reactions    Morphine Other (See Comments)     Patient had a psychotic episode after taking Morphine  Agitation, hallucinations    Penicillins Anaphylaxis     itching    Januvia [sitagliptin] Hives    Carbamazepine Other (See Comments)     hyponatremia       Actual Body Weight:   118.5 kg    Renal Function:   Estimated Creatinine Clearance: 111.5 mL/min (based on SCr of 0.8 mg/dL).,     Dialysis Method (if applicable):  N/A    CBC (last 72 hours):  Recent Labs   Lab Result Units 05/06/22  0536 05/07/22  0556   WBC K/uL 14.00* 11.79   Hemoglobin g/dL 7.3* 7.5*   Hematocrit % 22.0* 23.0*   Platelets K/uL 601* 653*   Gran % % 76.7* 61.6   Lymph % % 10.1* 18.1   Mono % % 10.1 13.7   Eosinophil % % 0.9 2.2   Basophil % % 0.2 0.5   Differential Method  Automated Automated       Metabolic Panel (last 72  hours):  Recent Labs   Lab Result Units 05/06/22  0536 05/06/22  1155 05/06/22  1732 05/07/22  0000 05/07/22  0556 05/07/22  2031 05/08/22  0511   Sodium mmol/L 119* 118* 121* 121* 123* 128*  --    Potassium mmol/L 5.7* 5.0 5.1 5.2* 4.9 5.3*  --    Chloride mmol/L 88* 89* 90* 90* 91* 96  --    CO2 mmol/L 26 25 25 24 25 25  --    Glucose mg/dL 110 138* 115* 190* 128* 173*  --    BUN mg/dL 13 11 9 9 8 9  --    Creatinine mg/dL 0.7 0.6 0.6 0.7 0.7 0.8  --    Magnesium mg/dL 1.8  --   --   --  1.7  --  1.7   Phosphorus mg/dL 3.0  --   --   --   --   --   --        Vancomycin Administrations:  vancomycin given in the last 96 hours                     vancomycin (VANCOCIN) 1,750 mg in dextrose 5 % 500 mL IVPB (mg) 1,750 mg New Bag 05/08/22 2203     1,750 mg New Bag  1109     1,750 mg New Bag 05/07/22 2256     1,750 mg New Bag  1300     1,750 mg New Bag 05/06/22 2242     1,750 mg New Bag  1038     1,750 mg New Bag 05/05/22 2235                    Microbiologic Results:  Microbiology Results (last 7 days)       Procedure Component Value Units Date/Time    AFB Culture & Smear [[id number]] Collected: 05/06/22 1249    Order Status: Completed Specimen: Bone from Toe, Right Foot Updated: 05/08/22 2127     AFB Culture & Smear Culture in progress    Blood culture [[id number]] Collected: 05/06/22 1155    Order Status: Completed Specimen: Blood from Antecubital, Right Hand Updated: 05/08/22 1303     Blood Culture, Routine No Growth to date      No Growth to date      No Growth to date    Blood culture [[id number]] Collected: 05/06/22 1155    Order Status: Completed Specimen: Blood from Peripheral, Left Hand Updated: 05/08/22 1303     Blood Culture, Routine No Growth to date      No Growth to date      No Growth to date    Blood culture x two cultures. Draw prior to antibiotics. [[id number]] Collected: 05/04/22 0947    Order Status: Completed Specimen: Blood from Peripheral, Antecubital, Left Updated: 05/08/22 1103     Blood Culture,  Routine No Growth after 4 days.     Narrative:      Aerobic and anaerobic    Aerobic culture [[id number]] Collected: 05/06/22 1249    Order Status: Completed Specimen: Bone from Toe, Right Foot Updated: 05/08/22 0904     Aerobic Bacterial Culture No growth    Blood culture x two cultures. Draw prior to antibiotics. [[id number]]  (Abnormal)  (Susceptibility) Collected: 05/04/22 0944    Order Status: Completed Specimen: Blood from Peripheral, Antecubital, Right Updated: 05/07/22 0809     Blood Culture, Routine Gram stain abbe bottle: Gram positive cocci in clusters resembling Staph       Results called to and read back by: Linnette KRAMER 05/05/2022        09:58      METHICILLIN RESISTANT STAPHYLOCOCCUS AUREUS    Narrative:      Aerobic and anaerobic    Gram stain [[id number]] Collected: 05/06/22 1249    Order Status: Completed Specimen: Bone from Toe, Right Foot Updated: 05/06/22 1446     Gram Stain Result No organisms seen    Fungus culture [[id number]] Collected: 05/06/22 1249    Order Status: Sent Specimen: Bone from Toe, Right Foot Updated: 05/06/22 1302    Aerobic culture [[id number]]  (Abnormal)  (Susceptibility) Collected: 05/04/22 1500    Order Status: Completed Specimen: Wound from Foot, Right Updated: 05/06/22 1126     Aerobic Bacterial Culture Results called to and read back by: Linnette Alvarado 05/06/2022  08:40      ESCHERICHIA COLI  Many        ENTEROBACTER CLOACAE  Moderate        METHICILLIN RESISTANT STAPHYLOCOCCUS AUREUS  Many      Aerobic culture [[id number]]  (Abnormal)  (Susceptibility) Collected: 05/04/22 1500    Order Status: Completed Specimen: Wound from Foot, Left Updated: 05/06/22 0844     Aerobic Bacterial Culture Results called to and read back by: Linnette Alvarado 05/06/2022  08:40      METHICILLIN RESISTANT STAPHYLOCOCCUS AUREUS  Many

## 2022-05-09 NOTE — ASSESSMENT & PLAN NOTE
Presents with a sodium of 118 which is new. Worsened to 113. She is currently asymptomatic. Cause= carbamazepine  - consulted Nephrology for help with management  - Nephro started fludrocortisone on 5/9  - carbamazepine added to allergy/intolerance list to prevent it from being prescribed again   - monitor BMP

## 2022-05-09 NOTE — ANESTHESIA PREPROCEDURE EVALUATION
05/09/2022    Pre-operative evaluation for Procedure(s) (LRB):  DEBRIDEMENT, FOOT (Bilateral)    Audrey Natarajan is a 51 y.o. female  With PMHx of Arthritis, Asthma, Bipolar 1 disorder, COPD, CAD, DM, DVT of LE (bilateral),  HTN, HLD, Irregular heartbeat, PE, who presents with abscess of foot and concern for osteomyelitis.     Case complicated by several factors on admission:   (1) new onset hyponatremia: Na 118 on admission; now 131.  (2) anemia: Hgb down to 7 from 9 @ baseline; currently stable  (3) non compliance issue: patient states she cannot find her medicine / may be hidden, taken from her by family; so Eliquuis is only taken when she states she can find it; extremely high risk of PE/DVT, apixaban restarted    (4) patient states sister signs consent - called sister yesterday evening & no answer; patient states sister works a lot & sometimes this includes evenings.     Patient Active Problem List   Diagnosis    Essential hypertension    COPD (chronic obstructive pulmonary disease)    Hyperlipidemia    Tobacco abuse    Mild protein malnutrition    Diabetic neuropathy    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    Leg swelling    Incisional hernia without mention of obstruction or gangrene    DM type 2 without retinopathy    History of DVT (deep vein thrombosis)    History of pulmonary embolus (PE)    Bipolar 1 disorder    Gastroparesis due to DM    Thrombocytosis    Severe obesity (BMI >= 40)    Type 2 diabetes mellitus    Acne    Other chronic pain    Nuclear sclerosis of both eyes    Bilateral ocular hypertension    Refractive error    Long term (current) use of anticoagulants    History of pulmonary embolism    DVT, recurrent, lower extremity, chronic, left    Decreased ROM of ankle    Decreased strength of lower extremity    Balance problem    Gait abnormality    Fatty liver    Hepatomegaly    History of bariatric surgery    Need for prophylactic vaccination  against hepatitis A and hepatitis B    Liver fibrosis    History of diabetic ulcer of foot    Acute exacerbation of psychosis    Diabetic ulcer of left midfoot associated with type 2 diabetes mellitus, limited to breakdown of skin    Psychosis    SHAWN (obstructive sleep apnea)    Insomnia    Nasal congestion    Chronic deep vein thrombosis (DVT) of both lower extremities    Diabetic foot ulcer associated with type 2 diabetes mellitus    Sepsis due to methicillin resistant Staphylococcus aureus (MRSA)    Hyperkalemia    Lymphadenopathy, inguinal    Hyponatremia    Bacteremia due to Staphylococcus    Osteomyelitis of right foot    Iron deficiency anemia    Cellulitis of both feet    PAD (peripheral artery disease)    Constipation       Review of patient's allergies indicates:   Allergen Reactions    Morphine Other (See Comments)     Patient had a psychotic episode after taking Morphine  Agitation, hallucinations    Penicillins Anaphylaxis     itching    Januvia [sitagliptin] Hives    Carbamazepine Other (See Comments)     hyponatremia       No current facility-administered medications on file prior to encounter.     Current Outpatient Medications on File Prior to Encounter   Medication Sig Dispense Refill    acetaminophen (TYLENOL) 500 MG tablet Take 2 tablets (1,000 mg total) by mouth every 6 (six) hours as needed for Pain. 30 tablet 0    albuterol (PROVENTIL/VENTOLIN HFA) 90 mcg/actuation inhaler Inhale 2 puffs into the lungs every 6 (six) hours as needed for Wheezing. Use with spacer  Dispense with 1 spacer 18 g 0    albuterol-ipratropium (DUO-NEB) 2.5 mg-0.5 mg/3 mL nebulizer solution Take 3 mLs by nebulization every 6 (six) hours as needed for Wheezing or Shortness of Breath. Rescue 1 Box 0    apixaban (ELIQUIS) 5 mg Tab Take 1 tablet (5 mg total) by mouth 2 (two) times daily. 30 tablet 3    aspirin 81 MG Chew Take 1 tablet (81 mg total) by mouth once daily. 30 tablet 11     dicyclomine (BENTYL) 20 mg tablet Take 1 tablet (20 mg total) by mouth every 6 (six) hours. 30 tablet 0    divalproex (DEPAKOTE) 250 MG EC tablet Take 5 tablets (1,250 mg total) by mouth every evening. 150 tablet 11    divalproex (DEPAKOTE) 500 MG TbEC Take 1 tablet (500 mg total) by mouth once daily. PO QAM 30 tablet 11    DUPIXENT  mg/2 mL PnIj SMARTSI Milligram(s) SUB-Q Every 2 Weeks      EPITOL 200 mg tablet Take 200 mg by mouth 2 (two) times a day.      famotidine (PEPCID) 20 MG tablet Take 20 mg by mouth 2 (two) times daily.      fluticasone propionate (FLONASE) 50 mcg/actuation nasal spray 1 spray (50 mcg total) by Each Nostril route 2 (two) times daily. 16 g 0    furosemide (LASIX) 20 MG tablet TAKE 1 TABLET(20 MG) BY MOUTH EVERY DAY 90 tablet 1    gabapentin (NEURONTIN) 300 MG capsule TAKE 2 CAPSULES(600 MG) BY MOUTH TWICE DAILY 120 capsule 2    hydrOXYzine (ATARAX) 50 MG tablet Take 50 mg by mouth 4 (four) times daily as needed.      ibuprofen (ADVIL,MOTRIN) 600 MG tablet TAKE 1 TABLET(600 MG) BY MOUTH EVERY 8 HOURS AS NEEDED FOR PAIN OR BACK PAIN 90 tablet 0    lisinopriL 10 MG tablet Take 1 tablet (10 mg total) by mouth once daily. 90 tablet 3    loratadine (CLARITIN) 10 mg tablet Take 1 tablet (10 mg total) by mouth once daily. 60 tablet 0    magnesium oxide (MAG-OX) 400 mg (241.3 mg magnesium) tablet Take 1 tablet (400 mg total) by mouth 2 (two) times daily. 30 tablet 3    metFORMIN (GLUCOPHAGE) 1000 MG tablet TAKE 1 TABLET(1000 MG) BY MOUTH TWICE DAILY WITH MEALS 180 tablet 2    metoprolol tartrate (LOPRESSOR) 50 MG tablet TAKE 1 TABLET(50 MG) BY MOUTH TWICE DAILY 180 tablet 2    OLANZapine (ZYPREXA) 10 MG tablet Take 1 tablet (10 mg total) by mouth every 8 (eight) hours as needed (Agitation). 30 tablet 11    OPTICHAMBER BIGG LG MASK Spcr Inhale into the lungs.      pantoprazole (PROTONIX) 40 MG tablet Take 1 tablet (40 mg total) by mouth once daily. 30 tablet 0     polyvinyl alcohol, artificial tears, (LIQUIFILM TEARS) 1.4 % ophthalmic solution Place 1 drop into both eyes 4 (four) times daily. 15 mL 2    pravastatin (PRAVACHOL) 40 MG tablet TAKE 1 TABLET(40 MG) BY MOUTH EVERY EVENING 90 tablet 3    risperiDONE (RISPERDAL M-TABS) 3 MG disintegrating tablet Take 1 tablet (3 mg total) by mouth 2 (two) times daily. 60 tablet 11    senna (SENOKOT) 8.6 mg tablet Take 1 tablet by mouth 2 (two) times a day. 60 tablet 2    blood sugar diagnostic Strp To check BG two  times daily, to use with insurance preferred meter (Patient taking differently: To check BG two  times daily, to use with insurance preferred meter) 100 each 1    blood-glucose meter kit To check BG once daily, to use with insurance preferred meter 1 each 0    blood-glucose meter kit To check BG two times daily, to use with insurance preferred meter 1 each 0    fluticasone-salmeterol diskus inhaler 250-50 mcg Inhale 1 puff into the lungs 2 (two) times daily. Controller 60 each 2    hydrOXYzine pamoate (VISTARIL) 50 MG Cap Take 1 capsule (50 mg total) by mouth 3 (three) times daily. 90 capsule 2    lancets Misc To check BG two times daily, to use with insurance preferred meter 100 each 1    multivitamin Tab Take 1 tablet by mouth once daily. 30 tablet 2    TRUE METRIX GLUCOSE METER Misc CHECK BLOOD SUGAR TWICE DAILY 1 each 0    [DISCONTINUED] diclofenac sodium (VOLTAREN) 1 % Gel Apply 2 g topically 4 (four) times daily as needed (Apply to painful area up to 4 times a day as needed for pain). Apply to painful area 4 times a day as needed for pain 1 Tube 0    [DISCONTINUED] nystatin (NYSTOP) powder APPLY TOPICALLY TO AXILLA AND BREAST FOLDS TWICE DAILY 60 g 2    [DISCONTINUED] QUEtiapine (SEROQUEL) 200 MG Tab Take 1 tablet (200 mg total) by mouth before breakfast. 30 tablet 2       Past Surgical History:   Procedure Laterality Date    ABDOMINAL SURGERY  2010    gastric sleeve    BILATERAL OOPHORECTOMY  "Bilateral 2015    CHOLECYSTECTOMY      Green' s filter Right 2012    Right Neck & Tunneled Down.    HERNIA REPAIR      "Closter of Hernias Repaires around th Belly Button.", pt. states    LAPAROSCOPIC CHOLECYSTECTOMY N/A 9/10/2020    Procedure: CHOLECYSTECTOMY, LAPAROSCOPIC;  Surgeon: Montrell Gutierrez MD;  Location: [facility] OR;  Service: General;  Laterality: N/A;  RN PREOP ----COVID Negative      OVARIAN CYST REMOVAL  3/13/2014    CO REMOVAL OF OVARY/TUBE(S)      SPLENECTOMY, TOTAL  2003    TONSILLECTOMY      as a child    TYMPANOSTOMY TUBE PLACEMENT      VEIN SURGERY      Lt leg       Social History     Socioeconomic History    Marital status: Significant Other   Tobacco Use    Smoking status: Current Every Day Smoker     Packs/day: 1.00     Years: 37.00     Pack years: 37.00     Types: Cigarettes     Last attempt to quit: 2020     Years since quittin.4    Smokeless tobacco: Never Used    Tobacco comment: Enrolled in the [employer] on 5/3/14 ([facility] Member ID # [id number]). Ambulatory referral to Smoking Cessation Program   Substance and Sexual Activity    Alcohol use: No     Alcohol/week: 0.0 standard drinks    Drug use: No    Sexual activity: Yes     Partners: Male   Social History Narrative     from her  of 20 years    Lives by herself since 2019         CBC:   Recent Labs     22  0556 22  0555   WBC 11.79 13.41*   RBC 2.76* 2.79*   HGB 7.5* 7.7*   HCT 23.0* 23.8*   * 655*   MCV 83 85   MCH 27.2 27.6   MCHC 32.6 32.4       CMP:   Recent Labs     221 22  0511 22  0555   *  --  131*   K 5.3*  --  5.6*   CL 96  --  99   CO2 25  --  24   BUN 9  --  10   CREATININE 0.8  --  0.7   *  --  153*   MG  --  1.7 1.7   CALCIUM 8.3*  --  8.6*         Pre-op Assessment    I have reviewed the Patient Summary Reports.    I have reviewed the NPO Status.   I have reviewed the Medications.     Review of " Systems  Anesthesia Hx:  No problems with previous Anesthesia  History of prior surgery of interest to airway management or planning:  Denies Personal Hx of Anesthesia complications.   Social:  Smoker    Hematology/Oncology:         -- Anemia:   Cardiovascular:   Hypertension CAD      Pulmonary:   COPD Asthma Sleep Apnea    Renal/:   Chronic Renal Disease    Hepatic/GI:   Liver Disease,    Neurological:   Neuromuscular Disease,    Endocrine:   Diabetes, type 2    Psych:   Psychiatric History          Physical Exam  General: Well nourished    Airway:  Mallampati: II   Mouth Opening: Normal  Tongue: Normal  Neck ROM: Normal ROM    Dental:  Intact    Chest/Lungs:  Clear to auscultation    Heart:  Rate: Normal  Rhythm: Regular Rhythm  Sounds: Normal        Anesthesia Plan  Type of Anesthesia, risks & benefits discussed:    Anesthesia Type: MAC  Intra-op Monitoring Plan: Standard ASA Monitors  Post Op Pain Control Plan: multimodal analgesia  Induction:  IV  Informed Consent: Informed consent signed with the Patient representative and all parties understand the risks and agree with anesthesia plan.  All questions answered.   ASA Score: 3  Anesthesia Plan Notes: SisterRozina consented by phone: [phone];         Ready For Surgery From Anesthesia Perspective.     .

## 2022-05-09 NOTE — PROGRESS NOTES
Pharmacokinetic Assessment Follow Up: IV Vancomycin    Vancomycin serum concentration assessment(s):    The trough level was drawn correctly and can be used to guide therapy at this time. The measurement is within the desired definitive target range of 10 to 20 mcg/mL.    Vancomycin Regimen Plan:    Due to trough result of 19.6 on the upper limit of target range, Change regimen to Vancomycin 1500 mg IV every 12 hours with next serum trough concentration measured at 09:30 prior to third dose on 5/10/22    Drug levels (last 3 results):  Recent Labs   Lab Result Units 05/06/22  2152 05/08/22  2126   Vancomycin-Trough ug/mL 16.4 19.6       Pharmacy will continue to follow and monitor vancomycin.    Please contact pharmacy at extension [phone] for questions regarding this assessment.    Thank you for the consult,   Maria E Lozano       Patient brief summary:  Audrey Natarajan is a 51 y.o. female initiated on antimicrobial therapy with IV Vancomycin for treatment of skin & soft tissue infection    The patient's current regimen is Vancomycin 1500 mg IV q12h    Drug Allergies:   Review of patient's allergies indicates:   Allergen Reactions    Morphine Other (See Comments)     Patient had a psychotic episode after taking Morphine  Agitation, hallucinations    Penicillins Anaphylaxis     itching    Januvia [sitagliptin] Hives    Carbamazepine Other (See Comments)     hyponatremia       Actual Body Weight:   118.5 kg    Renal Function:   Estimated Creatinine Clearance: 111.5 mL/min (based on SCr of 0.8 mg/dL).,     Dialysis Method (if applicable):  N/A    CBC (last 72 hours):  Recent Labs   Lab Result Units 05/06/22  0536 05/07/22  0556   WBC K/uL 14.00* 11.79   Hemoglobin g/dL 7.3* 7.5*   Hematocrit % 22.0* 23.0*   Platelets K/uL 601* 653*   Gran % % 76.7* 61.6   Lymph % % 10.1* 18.1   Mono % % 10.1 13.7   Eosinophil % % 0.9 2.2   Basophil % % 0.2 0.5   Differential Method  Automated Automated       Metabolic Panel (last 72  hours):  Recent Labs   Lab Result Units 05/06/22  0536 05/06/22  1155 05/06/22  1732 05/07/22  0000 05/07/22  0556 05/07/22  2031 05/08/22  0511   Sodium mmol/L 119* 118* 121* 121* 123* 128*  --    Potassium mmol/L 5.7* 5.0 5.1 5.2* 4.9 5.3*  --    Chloride mmol/L 88* 89* 90* 90* 91* 96  --    CO2 mmol/L 26 25 25 24 25 25  --    Glucose mg/dL 110 138* 115* 190* 128* 173*  --    BUN mg/dL 13 11 9 9 8 9  --    Creatinine mg/dL 0.7 0.6 0.6 0.7 0.7 0.8  --    Magnesium mg/dL 1.8  --   --   --  1.7  --  1.7   Phosphorus mg/dL 3.0  --   --   --   --   --   --        Vancomycin Administrations:  vancomycin given in the last 96 hours                     vancomycin (VANCOCIN) 1,750 mg in dextrose 5 % 500 mL IVPB (mg) 1,750 mg New Bag 05/08/22 2203     1,750 mg New Bag  1109     1,750 mg New Bag 05/07/22 2256     1,750 mg New Bag  1300     1,750 mg New Bag 05/06/22 2242     1,750 mg New Bag  1038     1,750 mg New Bag 05/05/22 2235                    Microbiologic Results:  Microbiology Results (last 7 days)       Procedure Component Value Units Date/Time    AFB Culture & Smear [[id number]] Collected: 05/06/22 1249    Order Status: Completed Specimen: Bone from Toe, Right Foot Updated: 05/08/22 2127     AFB Culture & Smear Culture in progress    Blood culture [[id number]] Collected: 05/06/22 1155    Order Status: Completed Specimen: Blood from Antecubital, Right Hand Updated: 05/08/22 1303     Blood Culture, Routine No Growth to date      No Growth to date      No Growth to date    Blood culture [[id number]] Collected: 05/06/22 1155    Order Status: Completed Specimen: Blood from Peripheral, Left Hand Updated: 05/08/22 1303     Blood Culture, Routine No Growth to date      No Growth to date      No Growth to date    Blood culture x two cultures. Draw prior to antibiotics. [[id number]] Collected: 05/04/22 0947    Order Status: Completed Specimen: Blood from Peripheral, Antecubital, Left Updated: 05/08/22 1103     Blood Culture,  Routine No Growth after 4 days.     Narrative:      Aerobic and anaerobic    Aerobic culture [[id number]] Collected: 05/06/22 1249    Order Status: Completed Specimen: Bone from Toe, Right Foot Updated: 05/08/22 0904     Aerobic Bacterial Culture No growth    Blood culture x two cultures. Draw prior to antibiotics. [[id number]]  (Abnormal)  (Susceptibility) Collected: 05/04/22 0944    Order Status: Completed Specimen: Blood from Peripheral, Antecubital, Right Updated: 05/07/22 0809     Blood Culture, Routine Gram stain abbe bottle: Gram positive cocci in clusters resembling Staph       Results called to and read back by: Linnette KRAMER 05/05/2022        09:58      METHICILLIN RESISTANT STAPHYLOCOCCUS AUREUS    Narrative:      Aerobic and anaerobic    Gram stain [[id number]] Collected: 05/06/22 1249    Order Status: Completed Specimen: Bone from Toe, Right Foot Updated: 05/06/22 1446     Gram Stain Result No organisms seen    Fungus culture [[id number]] Collected: 05/06/22 1249    Order Status: Sent Specimen: Bone from Toe, Right Foot Updated: 05/06/22 1302    Aerobic culture [[id number]]  (Abnormal)  (Susceptibility) Collected: 05/04/22 1500    Order Status: Completed Specimen: Wound from Foot, Right Updated: 05/06/22 1126     Aerobic Bacterial Culture Results called to and read back by: Linnette Alvarado 05/06/2022  08:40      ESCHERICHIA COLI  Many        ENTEROBACTER CLOACAE  Moderate        METHICILLIN RESISTANT STAPHYLOCOCCUS AUREUS  Many      Aerobic culture [[id number]]  (Abnormal)  (Susceptibility) Collected: 05/04/22 1500    Order Status: Completed Specimen: Wound from Foot, Left Updated: 05/06/22 0844     Aerobic Bacterial Culture Results called to and read back by: Linnette Alvarado 05/06/2022  08:40      METHICILLIN RESISTANT STAPHYLOCOCCUS AUREUS  Many

## 2022-05-09 NOTE — PROGRESS NOTES
[facility] Medicine  Progress Note    Patient Name: Audrey Natarajan  MRN: [id number]  Patient Class: IP- Inpatient   Admission Date: 5/4/2022  Length of Stay: 5 days  Attending Physician: Keyona Darden MD  Primary Care Provider: Donaldo Pena MD        Subjective:     Principal Problem:Sepsis due to methicillin resistant Staphylococcus aureus (MRSA)        HPI:  Ms. Natarajan is a 51-year-old female with extensive past medical history including type 2 diabetes, hypertension, CKD, bipolar disorder, and recurrent foot infections who presents to the emergency department for evaluation of pain and worsening redness of her foot. Patient recently discharged from [facility], [place] on April 26 after a complicated hospital stay due to psychosis and diabetic foot ulcer growing MRSA.  She was discharged home with her stepdaughter to continue antibiotics and follow-up.  The patient now states that her stepdaughter had taken her medications and hid them from her.  She states she just found her medications 3 days ago and started retaking her antibiotics but in the meantime, her feet wounds have worsened and she has noticed increased swelling and redness going up her legs.  She is also in significant pain.  She is very tearful.  She has felt feverish but no chills.  She denies any urinary symptoms.    In the emergency department, she was found to significant foot wounds.  See media tab.  She was also found to significant lab abnormalities including a WBC count of 20.5 1000, a sodium of 118, procalcitonin 0.34,  and . She was started IV antibiotics to cover her history of MRSA.  Medications were reviewed from previous hospital stay and restarted.  Podiatry was consulted.  She was admitted to hospital medicine for further management.      I spoke with her sister Anitra, and patient has not been taking her medications and has been accusing her stepdaughter - these are not true statements,  she feels like she is not able to take care of herself.       Overview/Hospital Course:  Mrs. Natarajan was admitted with sepsis, hyponatremia, and diabetic foot wounds.      Regarding sepsis and DM foot wounds: She was started on IV antibiotics with a history of MRSA. Blood culture 5/5 with Staph aureus in 1/4 cultures, repeat blood cultures negative. Podiatry consulted. MRI of L foot shows Charcot changes, difficult to rule out septic arthritis. MRI of R foot shows cellulitis and osteomyelitis of 1st digit. Patient declines amputation. Bone biopsy performed 5/6 with results no growth. Plan for further debridement in OR. ID consulted. Arterial US noted to be abnormal. Vascular surgery consulted as well.     Regarding hyponatremia: Due to carbamazepine. Na 113 at lowest. Nephrology consulted and started IVF. Na is slowly correcting. Psychiatry consulted due to stopping medication for bipolar disorder and concern for psychiatric disease becoming uncontrolled as a result. OK to continue divalproex, risperidone. No need for PEC.     She has also had anemia. Required transfusion 1U RBC on 5/5. No active GI bleeding noted. Resumed eliquis for bilateral DVTs noted 1 month ago.       Interval History: Feeling well today. No complaints.     Review of Systems   Constitutional:  Negative for chills and fever.   Respiratory:  Negative for shortness of breath.    Cardiovascular:  Positive for leg swelling. Negative for chest pain.   Gastrointestinal:  Negative for abdominal pain, constipation, diarrhea, nausea and vomiting.   Genitourinary:  Negative for difficulty urinating.   Musculoskeletal:  Negative for arthralgias, back pain and myalgias.   Skin:  Positive for wound.   Neurological:  Positive for weakness and numbness.   Psychiatric/Behavioral:  Negative for confusion.    Objective:     Vital Signs (Most Recent):  Temp: 98.2 °F (36.8 °C) (05/09/22 0814)  Pulse: 83 (05/09/22 0814)  Resp: 20 (05/09/22 0814)  BP: (!) 170/77  (05/09/22 0814)  SpO2: 98 % (05/09/22 0814) Vital Signs (24h Range):  Temp:  [98.1 °F (36.7 °C)-100 °F (37.8 °C)] 98.2 °F (36.8 °C)  Pulse:  [67-90] 83  Resp:  [17-20] 20  SpO2:  [96 %-99 %] 98 %  BP: (139-175)/(58-77) 170/77     Weight: 118.5 kg (261 lb 3.9 oz)  Body mass index is 42.17 kg/m².    Intake/Output Summary (Last 24 hours) at 5/9/2022 1214  Last data filed at 5/9/2022 0814  Gross per 24 hour   Intake 840 ml   Output --   Net 840 ml        Physical Exam  Vitals and nursing note reviewed.   Constitutional:       General: She is not in acute distress.     Appearance: She is obese. She is not ill-appearing or toxic-appearing.   HENT:      Head: Normocephalic and atraumatic.      Nose: Nose normal.      Mouth/Throat:      Mouth: Mucous membranes are moist.   Cardiovascular:      Rate and Rhythm: Normal rate and regular rhythm.      Heart sounds: Normal heart sounds. No murmur heard.    No gallop.      Comments: Unable to palpate pedal pulses due to dressings  Pulmonary:      Effort: Pulmonary effort is normal. No respiratory distress.      Breath sounds: Normal breath sounds. No wheezing or rales.      Comments: Room air  Abdominal:      General: Bowel sounds are normal. There is no distension.      Palpations: Abdomen is soft.      Tenderness: There is no abdominal tenderness. There is no guarding.   Musculoskeletal:      Right lower leg: Edema present.      Left lower leg: Edema present.   Skin:     General: Skin is warm and dry.      Comments: Feet are bandaged   Neurological:      Mental Status: She is alert and oriented to person, place, and time.       Significant Labs: All pertinent labs within the past 24 hours have been reviewed.    Significant Imaging: I have reviewed all pertinent imaging results/findings within the past 24 hours.      Assessment/Plan:      * Sepsis due to methicillin resistant Staphylococcus aureus (MRSA)  This patient does have evidence of infective focus- bilateral DM foot  "wounds and bacteremia  My overall impression is sepsis. Vital signs were reviewed and noted in progress note.  Antibiotics given-   Antibiotics (From admission, onward)            Start     Stop Route Frequency Ordered    05/09/22 1030  vancomycin 1.5 g in dextrose 5 % 250 mL IVPB (ready to mix)         -- IV Every 12 hours (non-standard times) 05/09/22 0345    05/07/22 1900  ciprofloxacin HCl tablet 500 mg         -- Oral Every 12 hours 05/07/22 1135    05/05/22 1415  metronidazole IVPB 500 mg         -- IV Every 8 hours (non-standard times) 05/05/22 1311    05/04/22 1245  mupirocin 2 % ointment         05/09 0859 Nasl 2 times daily 05/04/22 1135    05/04/22 1103  vancomycin - pharmacy to dose  (vancomycin IVPB)        "And" Linked Group Details    -- IV pharmacy to manage frequency 05/04/22 1004        Cultures were taken-   Microbiology Results (last 7 days)     Procedure Component Value Units Date/Time    Aerobic culture [[id number]] Collected: 05/06/22 1249    Order Status: Completed Specimen: Bone from Toe, Right Foot Updated: 05/09/22 0717     Aerobic Bacterial Culture No growth    AFB Culture & Smear [[id number]] Collected: 05/06/22 1249    Order Status: Completed Specimen: Bone from Toe, Right Foot Updated: 05/08/22 2127     AFB Culture & Smear Culture in progress    Blood culture [[id number]] Collected: 05/06/22 1155    Order Status: Completed Specimen: Blood from Antecubital, Right Hand Updated: 05/08/22 1303     Blood Culture, Routine No Growth to date      No Growth to date      No Growth to date    Blood culture [[id number]] Collected: 05/06/22 1155    Order Status: Completed Specimen: Blood from Peripheral, Left Hand Updated: 05/08/22 1303     Blood Culture, Routine No Growth to date      No Growth to date      No Growth to date    Blood culture x two cultures. Draw prior to antibiotics. [[id number]] Collected: 05/04/22 0947    Order Status: Completed Specimen: Blood from Peripheral, Antecubital, Left " Updated: 05/08/22 1103     Blood Culture, Routine No Growth after 4 days.     Narrative:      Aerobic and anaerobic    Blood culture x two cultures. Draw prior to antibiotics. [[id number]]  (Abnormal)  (Susceptibility) Collected: 05/04/22 0944    Order Status: Completed Specimen: Blood from Peripheral, Antecubital, Right Updated: 05/07/22 0809     Blood Culture, Routine Gram stain abbe bottle: Gram positive cocci in clusters resembling Staph       Results called to and read back by: Linnette KRAMER 05/05/2022        09:58      METHICILLIN RESISTANT STAPHYLOCOCCUS AUREUS    Narrative:      Aerobic and anaerobic    Gram stain [[id number]] Collected: 05/06/22 1249    Order Status: Completed Specimen: Bone from Toe, Right Foot Updated: 05/06/22 1446     Gram Stain Result No organisms seen    Fungus culture [[id number]] Collected: 05/06/22 1249    Order Status: Sent Specimen: Bone from Toe, Right Foot Updated: 05/06/22 1302    Aerobic culture [[id number]]  (Abnormal)  (Susceptibility) Collected: 05/04/22 1500    Order Status: Completed Specimen: Wound from Foot, Right Updated: 05/06/22 1126     Aerobic Bacterial Culture Results called to and read back by: Linnette Alvarado 05/06/2022  08:40      ESCHERICHIA COLI  Many        ENTEROBACTER CLOACAE  Moderate        METHICILLIN RESISTANT STAPHYLOCOCCUS AUREUS  Many      Aerobic culture [[id number]]  (Abnormal)  (Susceptibility) Collected: 05/04/22 1500    Order Status: Completed Specimen: Wound from Foot, Left Updated: 05/06/22 0844     Aerobic Bacterial Culture Results called to and read back by: Linnette Alvarado 05/06/2022  08:40      METHICILLIN RESISTANT STAPHYLOCOCCUS AUREUS  Many          Latest lactate reviewed, they are-  No results for input(s): LACTATE in the last 72 hours.  Staph bacteremia- repeat blood cultures. TTE no vegetation.    MRI L foot: mid/forefoot cellulitis, Charcot changes cannot rule out septic arthritis  MRI R foot: cellulitis forefoot and great  toe. Osteomyelitis of 1st digit    Podiatry following. Declines amputation. Plan for further operative debridement- anticipate sometime this week.   Vascular consulted for abnormal arterial US- recs pending   ID consulted- anticipate prolonged IV antibiotics     Constipation  Bowel regimen ordered  Resolved     PAD (peripheral artery disease)  Arterial US abnormal  Vascular consulted - recs pending       Cellulitis of both feet  See sepsis      Iron deficiency anemia  Hgb decreased. No bleeding noted. Required 1U RBC transfusion on 5/5 with inappropriate response  - CBC in AM  - TSAT low       Osteomyelitis of right foot  See sepsis      Bacteremia due to Staphylococcus  See sepsis      Hyponatremia  Presents with a sodium of 118 which is new. Worsened to 113. She is currently asymptomatic. Cause= carbamazepine  - consulted Nephrology for help with management  - Nephro started fludrocortisone on 5/9  - carbamazepine added to allergy/intolerance list to prevent it from being prescribed again   - monitor BMP          Hyperkalemia  Sodium zirc ordered      Diabetic foot ulcer associated with type 2 diabetes mellitus  A1c:   Lab Results   Component Value Date    HGBA1C 7.0 (H) 04/13/2022     Meds: SSI PRN to maintain goal 140-180  ADA diet, accuchecks, hypoglycemic protocol          Chronic deep vein thrombosis (DVT) of both lower extremities  Present since 2021. Still present 4/2022  - resumed anticoagulation     Long term (current) use of anticoagulants  For chronic DVT, last visualized 4/2022  Resume anticoagulation as no active bleeding has been identified       Severe obesity (BMI >= 40)  Body mass index is 42.17 kg/m². Morbid obesity complicates all aspects of disease management from diagnostic modalities to treatment. Weight loss encouraged and health benefits explained to patient.         Thrombocytosis  Due to iron deficiency. Patient also reports history of splenectomy.       Bipolar 1 disorder  Long history  of bipolar 1 disorder with recent psychosis at last hospital stay.  Carbamazepine has caused hyponatremia.  - sister Anitra states patient has not been compliant with her medications and feels that she still has some paranoia that her stepdaughter had been hiding her medications though she believes it was the patient herself.  - Psych consulted- OK to continue current regimen of risperidone and depakoate minus carbamazepine      Diabetic neuropathy  Noted. Contributes to foot infections      COPD (chronic obstructive pulmonary disease)  No signs of COPD exacerbation. No PFTs to review.   Resume home medications      Essential hypertension  BP generally controlled  Continue metoprolol         VTE Risk Mitigation (From admission, onward)         Ordered     apixaban tablet 5 mg  2 times daily         05/07/22 1110     IP VTE HIGH RISK PATIENT  Once         05/04/22 1259     Place sequential compression device  Until discontinued         05/04/22 1259                Discharge Planning   HUMBERTO:      Code Status: Full Code   Is the patient medically ready for discharge?:     Reason for patient still in hospital (select all that apply): Patient trending condition  Discharge Plan A: Long-term acute care facility (LTAC)                  Keyona Darden MD  Department of Hospital Medicine   [facility] [place] - Telemetry

## 2022-05-09 NOTE — NURSING
Bedside Report given to night nurse DANK Perez. Walking rounds completed. Visualized and assessed patient NAD noted. Safety precautions maintained and call light within reach.     Chart check completed.

## 2022-05-09 NOTE — NURSING
ANA MARIA Gill requested the patient be provided a DARCO shoe for her left foot, as she has a DARCO shoe for the right foot at the bedside. Charge Nurse Joby assisted RN with ordering the IGOR shoe. Only able to get a knee high boot. ANA MARIA Gill notified. [facility] Nurse notified. Will notify PM shift to pass on to day shift tomorrow to order shoe when General supply is open per charge nurse. The patient stated that she has the knee high boot at home that does not work well for her. Heel protector boots at bedside.     IGOR shoe Lot # [id number]

## 2022-05-09 NOTE — HPI
HPI:  Audrey Natarajan is a 51 y.o. female with       Patient Active Problem List   Diagnosis    Essential hypertension    COPD (chronic obstructive pulmonary disease)    Hypertriglyceridemia    Tobacco abuse    Mild protein malnutrition    Diabetic neuropathy    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    Leg swelling    Incisional hernia without mention of obstruction or gangrene    DM type 2 without retinopathy    History of DVT (deep vein thrombosis)    History of pulmonary embolus (PE)    Bipolar 1 disorder    Gastroparesis due to DM    Thrombocytosis    Leukocytosis    Morbid obesity    Type 2 diabetes mellitus    Acne    Other chronic pain    Vomiting and diarrhea    Nuclear sclerosis of both eyes    Bilateral ocular hypertension    Refractive error    Long term (current) use of anticoagulants    Hypercoagulable state    History of pulmonary embolism    DVT, recurrent, lower extremity, chronic, left    Decreased ROM of ankle    Decreased strength of lower extremity    Balance problem    Gait abnormality    RUQ pain    Right upper quadrant abdominal pain    Leucocytosis    Fatty liver    Hepatomegaly    History of bariatric surgery    Need for prophylactic vaccination against hepatitis A and hepatitis B    Liver fibrosis    History of diabetic ulcer of foot    Cellulitis of left foot    Acute exacerbation of psychosis    Diabetic ulcer of left midfoot associated with type 2 diabetes mellitus, limited to breakdown of skin    Psychosis    being managed by PCP and specialists who is here today for evaluation of BLE pain.  9/1/19 presented to ER due to RLE cramping and LLE edema.  S/p LLE DVT 2003, unprovoked and treated with anticoagulation.  S/p PE and L femoral and PT DVT 2013 and had a Bard retrievable filter placed 2013; has had persistent pain and edema since that time.  Repeat US 9/1/19 in ER due to pain/edema showed chronic L PT.  Pt states 2013 after she injured her LLE and had a bleeding  "vein she had a "vein stripping" to the outside of her LLE.  Patient states location is BLE occurring for 6 yrs.  Associated signs and symptoms include discoloration.  Quality is sharp/throbbing and severity is 10/10 at worst, average 7/10.  Symptoms began 6 yrs ago.  Alleviating factors include elevation.  Worsening factors include dependency.  Denies claudication.       no MI  no Stroke  Tobacco use: 3 cig/day; prev 1 ppd x 35 yrs     12/2019: c/o severe LLE pain.  +compression daily.  C/o stockings being too tight.  States bilateral foot paresthesias.       3/2020:  Cont to c/o LLE pain.  Cannot tolerate compression.       5/2020:  C/o sharp LLE pain that limits her ambulating and sleep despite OTC pain medications.       8/2020:  Cont c/o LLE MSK and neuropathic pain.  +edema.  +compression/elevation > 3 months with continued decreased ability to perform ADLs and ambulation.     12/2020:  Cont to c/o LLE edema, pain despite compression and elevation > 3 mo.  Limiting her ADLs.     6/2021:  Presents with persistent BLE pain, edema and heaviness despite compression, elevation and diet changes > 3 mo limiting her ADLs.     9/2021:  S/p L GSV EVLT 7/30/21.  LLE feels better.    5/2022:  Developed BLE wounds 4 weeks ago.  States she was unable to obtain her Abx and wound care due to family taking her medication and not helping with dressing changes.  "

## 2022-05-09 NOTE — ASSESSMENT & PLAN NOTE
Long history of bipolar 1 disorder with recent psychosis at last hospital stay.  Carbamazepine has caused hyponatremia.  - sister Crystal states patient has not been compliant with her medications and feels that she still has some paranoia that her stepdaughter had been hiding her medications though she believes it was the patient herself.  - Psych consulted- OK to continue current regimen of risperidone and depakoate minus carbamazepine

## 2022-05-09 NOTE — ASSESSMENT & PLAN NOTE
"This patient does have evidence of infective focus- bilateral DM foot wounds and bacteremia  My overall impression is sepsis. Vital signs were reviewed and noted in progress note.  Antibiotics given-   Antibiotics (From admission, onward)            Start     Stop Route Frequency Ordered    05/09/22 1030  vancomycin 1.5 g in dextrose 5 % 250 mL IVPB (ready to mix)         -- IV Every 12 hours (non-standard times) 05/09/22 0345    05/07/22 1900  ciprofloxacin HCl tablet 500 mg         -- Oral Every 12 hours 05/07/22 1135    05/05/22 1415  metronidazole IVPB 500 mg         -- IV Every 8 hours (non-standard times) 05/05/22 1311    05/04/22 1245  mupirocin 2 % ointment         05/09 0859 Nasl 2 times daily 05/04/22 1135    05/04/22 1103  vancomycin - pharmacy to dose  (vancomycin IVPB)        "And" Linked Group Details    -- IV pharmacy to manage frequency 05/04/22 1004        Cultures were taken-   Microbiology Results (last 7 days)     Procedure Component Value Units Date/Time    Aerobic culture [[id number]] Collected: 05/06/22 1249    Order Status: Completed Specimen: Bone from Toe, Right Foot Updated: 05/09/22 0717     Aerobic Bacterial Culture No growth    AFB Culture & Smear [[id number]] Collected: 05/06/22 1249    Order Status: Completed Specimen: Bone from Toe, Right Foot Updated: 05/08/22 2127     AFB Culture & Smear Culture in progress    Blood culture [[id number]] Collected: 05/06/22 1155    Order Status: Completed Specimen: Blood from Antecubital, Right Hand Updated: 05/08/22 1303     Blood Culture, Routine No Growth to date      No Growth to date      No Growth to date    Blood culture [[id number]] Collected: 05/06/22 1155    Order Status: Completed Specimen: Blood from Peripheral, Left Hand Updated: 05/08/22 1303     Blood Culture, Routine No Growth to date      No Growth to date      No Growth to date    Blood culture x two cultures. Draw prior to antibiotics. [[id number]] Collected: 05/04/22 0947    Order " Status: Completed Specimen: Blood from Peripheral, Antecubital, Left Updated: 05/08/22 1103     Blood Culture, Routine No Growth after 4 days.     Narrative:      Aerobic and anaerobic    Blood culture x two cultures. Draw prior to antibiotics. [[id number]]  (Abnormal)  (Susceptibility) Collected: 05/04/22 0944    Order Status: Completed Specimen: Blood from Peripheral, Antecubital, Right Updated: 05/07/22 0809     Blood Culture, Routine Gram stain abbe bottle: Gram positive cocci in clusters resembling Staph       Results called to and read back by: Linnette KRAMER 05/05/2022        09:58      METHICILLIN RESISTANT STAPHYLOCOCCUS AUREUS    Narrative:      Aerobic and anaerobic    Gram stain [[id number]] Collected: 05/06/22 1249    Order Status: Completed Specimen: Bone from Toe, Right Foot Updated: 05/06/22 1446     Gram Stain Result No organisms seen    Fungus culture [[id number]] Collected: 05/06/22 1249    Order Status: Sent Specimen: Bone from Toe, Right Foot Updated: 05/06/22 1302    Aerobic culture [[id number]]  (Abnormal)  (Susceptibility) Collected: 05/04/22 1500    Order Status: Completed Specimen: Wound from Foot, Right Updated: 05/06/22 1126     Aerobic Bacterial Culture Results called to and read back by: Linnette Alvarado 05/06/2022  08:40      ESCHERICHIA COLI  Many        ENTEROBACTER CLOACAE  Moderate        METHICILLIN RESISTANT STAPHYLOCOCCUS AUREUS  Many      Aerobic culture [[id number]]  (Abnormal)  (Susceptibility) Collected: 05/04/22 1500    Order Status: Completed Specimen: Wound from Foot, Left Updated: 05/06/22 0844     Aerobic Bacterial Culture Results called to and read back by: Linnette Alvarado 05/06/2022  08:40      METHICILLIN RESISTANT STAPHYLOCOCCUS AUREUS  Many          Latest lactate reviewed, they are-  No results for input(s): LACTATE in the last 72 hours.  Staph bacteremia- repeat blood cultures. TTE no vegetation.    MRI L foot: mid/forefoot cellulitis, Charcot changes cannot  rule out septic arthritis  MRI R foot: cellulitis forefoot and great toe. Osteomyelitis of 1st digit    Podiatry following. Declines amputation. Plan for further operative debridement- anticipate sometime this week.   Vascular consulted for abnormal arterial US- recs pending   ID consulted- anticipate prolonged IV antibiotics

## 2022-05-09 NOTE — NURSING
Report received from night nurse DANK Valentin. Visualized patient and assessed patient's overall condition and appearance. No acute distress noted. Will continue to monitor

## 2022-05-10 ENCOUNTER — ANESTHESIA (OUTPATIENT)
Dept: SURGERY | Facility: HOSPITAL | Age: 52
DRG: 854 | End: 2022-05-10
Payer: MEDICAID

## 2022-05-10 LAB
ALBUMIN SERPL BCP-MCNC: 2.3 G/DL (ref 3.5–5.2)
ALP SERPL-CCNC: 96 U/L (ref 55–135)
ALT SERPL W/O P-5'-P-CCNC: 11 U/L (ref 10–44)
ANION GAP SERPL CALC-SCNC: 8 MMOL/L (ref 8–16)
AST SERPL-CCNC: 7 U/L (ref 10–40)
BACTERIA BLD CULT: NORMAL
BACTERIA BLD CULT: NORMAL
BACTERIA SPEC AEROBE CULT: NO GROWTH
BASOPHILS # BLD AUTO: 0.1 K/UL (ref 0–0.2)
BASOPHILS NFR BLD: 0.6 % (ref 0–1.9)
BILIRUB SERPL-MCNC: 0.2 MG/DL (ref 0.1–1)
BUN SERPL-MCNC: 11 MG/DL (ref 6–20)
CALCIUM SERPL-MCNC: 8.5 MG/DL (ref 8.7–10.5)
CHLORIDE SERPL-SCNC: 102 MMOL/L (ref 95–110)
CO2 SERPL-SCNC: 23 MMOL/L (ref 23–29)
CREAT SERPL-MCNC: 0.8 MG/DL (ref 0.5–1.4)
DIFFERENTIAL METHOD: ABNORMAL
EOSINOPHIL # BLD AUTO: 0.4 K/UL (ref 0–0.5)
EOSINOPHIL NFR BLD: 2.3 % (ref 0–8)
ERYTHROCYTE [DISTWIDTH] IN BLOOD BY AUTOMATED COUNT: 18 % (ref 11.5–14.5)
EST. GFR  (AFRICAN AMERICAN): >60 ML/MIN/1.73 M^2
EST. GFR  (NON AFRICAN AMERICAN): >60 ML/MIN/1.73 M^2
FINAL PATHOLOGIC DIAGNOSIS: NORMAL
GLUCOSE SERPL-MCNC: 164 MG/DL (ref 70–110)
GROSS: NORMAL
HCT VFR BLD AUTO: 26.7 % (ref 37–48.5)
HGB BLD-MCNC: 8.4 G/DL (ref 12–16)
IMM GRANULOCYTES # BLD AUTO: 0.53 K/UL (ref 0–0.04)
IMM GRANULOCYTES NFR BLD AUTO: 3.4 % (ref 0–0.5)
LYMPHOCYTES # BLD AUTO: 4.2 K/UL (ref 1–4.8)
LYMPHOCYTES NFR BLD: 27.2 % (ref 18–48)
Lab: NORMAL
MAGNESIUM SERPL-MCNC: 1.6 MG/DL (ref 1.6–2.6)
MCH RBC QN AUTO: 26.8 PG (ref 27–31)
MCHC RBC AUTO-ENTMCNC: 31.5 G/DL (ref 32–36)
MCV RBC AUTO: 85 FL (ref 82–98)
MONOCYTES # BLD AUTO: 2 K/UL (ref 0.3–1)
MONOCYTES NFR BLD: 12.8 % (ref 4–15)
NEUTROPHILS # BLD AUTO: 8.3 K/UL (ref 1.8–7.7)
NEUTROPHILS NFR BLD: 53.7 % (ref 38–73)
NRBC BLD-RTO: 0 /100 WBC
PLATELET # BLD AUTO: 645 K/UL (ref 150–450)
PMV BLD AUTO: 8.9 FL (ref 9.2–12.9)
POCT GLUCOSE: 160 MG/DL (ref 70–110)
POCT GLUCOSE: 172 MG/DL (ref 70–110)
POCT GLUCOSE: 177 MG/DL (ref 70–110)
POCT GLUCOSE: 204 MG/DL (ref 70–110)
POTASSIUM SERPL-SCNC: 5.6 MMOL/L (ref 3.5–5.1)
PROT SERPL-MCNC: 6.3 G/DL (ref 6–8.4)
RBC # BLD AUTO: 3.13 M/UL (ref 4–5.4)
SODIUM SERPL-SCNC: 133 MMOL/L (ref 136–145)
VANCOMYCIN TROUGH SERPL-MCNC: 16.3 UG/ML (ref 10–22)
WBC # BLD AUTO: 15.49 K/UL (ref 3.9–12.7)

## 2022-05-10 PROCEDURE — D9220A PRA ANESTHESIA: ICD-10-PCS | Mod: ANES,,, | Performed by: ANESTHESIOLOGY

## 2022-05-10 PROCEDURE — 63600175 PHARM REV CODE 636 W HCPCS: Performed by: NURSE ANESTHETIST, CERTIFIED REGISTERED

## 2022-05-10 PROCEDURE — S0030 INJECTION, METRONIDAZOLE: HCPCS | Performed by: STUDENT IN AN ORGANIZED HEALTH CARE EDUCATION/TRAINING PROGRAM

## 2022-05-10 PROCEDURE — 11046 WOUND DEBRIDEMENT: ICD-10-PCS | Mod: ,,, | Performed by: PODIATRIST

## 2022-05-10 PROCEDURE — 83735 ASSAY OF MAGNESIUM: CPT | Performed by: HOSPITALIST

## 2022-05-10 PROCEDURE — 11042 WOUND DEBRIDEMENT: ICD-10-PCS | Mod: 59,,, | Performed by: PODIATRIST

## 2022-05-10 PROCEDURE — 25000003 PHARM REV CODE 250: Performed by: EMERGENCY MEDICINE

## 2022-05-10 PROCEDURE — D9220A PRA ANESTHESIA: Mod: ANES,,, | Performed by: ANESTHESIOLOGY

## 2022-05-10 PROCEDURE — 63600175 PHARM REV CODE 636 W HCPCS: Performed by: ANESTHESIOLOGY

## 2022-05-10 PROCEDURE — 25000003 PHARM REV CODE 250: Performed by: NURSE ANESTHETIST, CERTIFIED REGISTERED

## 2022-05-10 PROCEDURE — D9220A PRA ANESTHESIA: Mod: CRNA,,, | Performed by: NURSE ANESTHETIST, CERTIFIED REGISTERED

## 2022-05-10 PROCEDURE — D9220A PRA ANESTHESIA: ICD-10-PCS | Mod: CRNA,,, | Performed by: NURSE ANESTHETIST, CERTIFIED REGISTERED

## 2022-05-10 PROCEDURE — 25000003 PHARM REV CODE 250: Performed by: STUDENT IN AN ORGANIZED HEALTH CARE EDUCATION/TRAINING PROGRAM

## 2022-05-10 PROCEDURE — 99233 PR SUBSEQUENT HOSPITAL CARE,LEVL III: ICD-10-PCS | Mod: ,,, | Performed by: SURGERY

## 2022-05-10 PROCEDURE — 94640 AIRWAY INHALATION TREATMENT: CPT

## 2022-05-10 PROCEDURE — 71000039 HC RECOVERY, EACH ADD'L HOUR: Performed by: PODIATRIST

## 2022-05-10 PROCEDURE — 36415 COLL VENOUS BLD VENIPUNCTURE: CPT | Performed by: HOSPITALIST

## 2022-05-10 PROCEDURE — 11043 DBRDMT MUSC&/FSCA 1ST 20/<: CPT | Mod: ,,, | Performed by: PODIATRIST

## 2022-05-10 PROCEDURE — 80202 ASSAY OF VANCOMYCIN: CPT | Performed by: HOSPITALIST

## 2022-05-10 PROCEDURE — 25000003 PHARM REV CODE 250: Performed by: PODIATRIST

## 2022-05-10 PROCEDURE — 27000207 HC ISOLATION

## 2022-05-10 PROCEDURE — 37000009 HC ANESTHESIA EA ADD 15 MINS: Performed by: PODIATRIST

## 2022-05-10 PROCEDURE — 11046 DBRDMT MUSC&/FSCA EA ADDL: CPT | Mod: ,,, | Performed by: PODIATRIST

## 2022-05-10 PROCEDURE — 27201423 OPTIME MED/SURG SUP & DEVICES STERILE SUPPLY: Performed by: PODIATRIST

## 2022-05-10 PROCEDURE — 99233 PR SUBSEQUENT HOSPITAL CARE,LEVL III: ICD-10-PCS | Mod: ,,, | Performed by: PODIATRIST

## 2022-05-10 PROCEDURE — 25000003 PHARM REV CODE 250: Performed by: HOSPITALIST

## 2022-05-10 PROCEDURE — 99233 SBSQ HOSP IP/OBS HIGH 50: CPT | Mod: ,,, | Performed by: PODIATRIST

## 2022-05-10 PROCEDURE — 85025 COMPLETE CBC W/AUTO DIFF WBC: CPT | Performed by: HOSPITALIST

## 2022-05-10 PROCEDURE — 80053 COMPREHEN METABOLIC PANEL: CPT | Performed by: INTERNAL MEDICINE

## 2022-05-10 PROCEDURE — 37000008 HC ANESTHESIA 1ST 15 MINUTES: Performed by: PODIATRIST

## 2022-05-10 PROCEDURE — 71000033 HC RECOVERY, INTIAL HOUR: Performed by: PODIATRIST

## 2022-05-10 PROCEDURE — 11043 WOUND DEBRIDEMENT: ICD-10-PCS | Mod: ,,, | Performed by: PODIATRIST

## 2022-05-10 PROCEDURE — 21400001 HC TELEMETRY ROOM

## 2022-05-10 PROCEDURE — 63600175 PHARM REV CODE 636 W HCPCS: Performed by: EMERGENCY MEDICINE

## 2022-05-10 PROCEDURE — 36000706: Performed by: PODIATRIST

## 2022-05-10 PROCEDURE — 99233 SBSQ HOSP IP/OBS HIGH 50: CPT | Mod: ,,, | Performed by: SURGERY

## 2022-05-10 PROCEDURE — 11042 DBRDMT SUBQ TIS 1ST 20SQCM/<: CPT | Mod: 59,,, | Performed by: PODIATRIST

## 2022-05-10 PROCEDURE — 63600175 PHARM REV CODE 636 W HCPCS: Performed by: PODIATRIST

## 2022-05-10 PROCEDURE — 36000707: Performed by: PODIATRIST

## 2022-05-10 RX ORDER — LIDOCAINE HYDROCHLORIDE 20 MG/ML
INJECTION INTRAVENOUS
Status: DISCONTINUED | OUTPATIENT
Start: 2022-05-10 | End: 2022-05-10

## 2022-05-10 RX ORDER — ACETAMINOPHEN 10 MG/ML
1000 INJECTION, SOLUTION INTRAVENOUS ONCE
Status: COMPLETED | OUTPATIENT
Start: 2022-05-10 | End: 2022-05-10

## 2022-05-10 RX ORDER — PROPOFOL 10 MG/ML
VIAL (ML) INTRAVENOUS CONTINUOUS PRN
Status: DISCONTINUED | OUTPATIENT
Start: 2022-05-10 | End: 2022-05-10

## 2022-05-10 RX ORDER — FENTANYL CITRATE 50 UG/ML
INJECTION, SOLUTION INTRAMUSCULAR; INTRAVENOUS
Status: DISCONTINUED | OUTPATIENT
Start: 2022-05-10 | End: 2022-05-10

## 2022-05-10 RX ORDER — LABETALOL HYDROCHLORIDE 5 MG/ML
INJECTION, SOLUTION INTRAVENOUS
Status: DISCONTINUED | OUTPATIENT
Start: 2022-05-10 | End: 2022-05-10

## 2022-05-10 RX ORDER — MIDAZOLAM HYDROCHLORIDE 1 MG/ML
INJECTION, SOLUTION INTRAMUSCULAR; INTRAVENOUS
Status: DISCONTINUED | OUTPATIENT
Start: 2022-05-10 | End: 2022-05-10

## 2022-05-10 RX ORDER — FENTANYL CITRATE 50 UG/ML
25 INJECTION, SOLUTION INTRAMUSCULAR; INTRAVENOUS EVERY 5 MIN PRN
Status: DISCONTINUED | OUTPATIENT
Start: 2022-05-10 | End: 2022-05-10 | Stop reason: HOSPADM

## 2022-05-10 RX ORDER — SODIUM CHLORIDE 0.9 % (FLUSH) 0.9 %
10 SYRINGE (ML) INJECTION
Status: DISCONTINUED | OUTPATIENT
Start: 2022-05-10 | End: 2022-05-10 | Stop reason: HOSPADM

## 2022-05-10 RX ADMIN — HYDROXYZINE PAMOATE 50 MG: 25 CAPSULE ORAL at 08:05

## 2022-05-10 RX ADMIN — FENTANYL CITRATE 50 MCG: 50 INJECTION, SOLUTION INTRAMUSCULAR; INTRAVENOUS at 01:05

## 2022-05-10 RX ADMIN — GLYCOPYRROLATE 0.1 MG: 0.2 INJECTION, SOLUTION INTRAMUSCULAR; INTRAVITREAL at 12:05

## 2022-05-10 RX ADMIN — HYDROXYZINE PAMOATE 50 MG: 25 CAPSULE ORAL at 03:05

## 2022-05-10 RX ADMIN — METOPROLOL TARTRATE 50 MG: 50 TABLET, FILM COATED ORAL at 08:05

## 2022-05-10 RX ADMIN — METOPROLOL TARTRATE 50 MG: 50 TABLET, FILM COATED ORAL at 09:05

## 2022-05-10 RX ADMIN — METRONIDAZOLE 500 MG: 500 INJECTION, SOLUTION INTRAVENOUS at 06:05

## 2022-05-10 RX ADMIN — RISPERIDONE 3 MG: 1 TABLET ORAL at 08:05

## 2022-05-10 RX ADMIN — ACETAMINOPHEN 1000 MG: 10 INJECTION INTRAVENOUS at 02:05

## 2022-05-10 RX ADMIN — LABETALOL HYDROCHLORIDE 5 MG: 5 INJECTION, SOLUTION INTRAVENOUS at 01:05

## 2022-05-10 RX ADMIN — VANCOMYCIN HYDROCHLORIDE 1500 MG: 1.5 INJECTION, POWDER, LYOPHILIZED, FOR SOLUTION INTRAVENOUS at 11:05

## 2022-05-10 RX ADMIN — PRAVASTATIN SODIUM 40 MG: 40 TABLET ORAL at 08:05

## 2022-05-10 RX ADMIN — PROPOFOL 100 MCG/KG/MIN: 10 INJECTION, EMULSION INTRAVENOUS at 12:05

## 2022-05-10 RX ADMIN — MIDAZOLAM HYDROCHLORIDE 2 MG: 1 INJECTION, SOLUTION INTRAMUSCULAR; INTRAVENOUS at 12:05

## 2022-05-10 RX ADMIN — INSULIN ASPART 1 UNITS: 100 INJECTION, SOLUTION INTRAVENOUS; SUBCUTANEOUS at 08:05

## 2022-05-10 RX ADMIN — OXYCODONE AND ACETAMINOPHEN 1 TABLET: 7.5; 325 TABLET ORAL at 09:05

## 2022-05-10 RX ADMIN — SODIUM CHLORIDE: 0.9 INJECTION, SOLUTION INTRAVENOUS at 12:05

## 2022-05-10 RX ADMIN — DIVALPROEX SODIUM 1250 MG: 250 TABLET, DELAYED RELEASE ORAL at 08:05

## 2022-05-10 RX ADMIN — VANCOMYCIN HYDROCHLORIDE 1500 MG: 1.5 INJECTION, POWDER, LYOPHILIZED, FOR SOLUTION INTRAVENOUS at 10:05

## 2022-05-10 RX ADMIN — FLUTICASONE FUROATE AND VILANTEROL TRIFENATATE 1 PUFF: 100; 25 POWDER RESPIRATORY (INHALATION) at 07:05

## 2022-05-10 RX ADMIN — LIDOCAINE HYDROCHLORIDE 60 MG: 20 INJECTION, SOLUTION INTRAVENOUS at 12:05

## 2022-05-10 RX ADMIN — Medication 1 G: at 03:05

## 2022-05-10 RX ADMIN — DIVALPROEX SODIUM 500 MG: 250 TABLET, DELAYED RELEASE ORAL at 09:05

## 2022-05-10 RX ADMIN — METRONIDAZOLE 500 MG: 500 INJECTION, SOLUTION INTRAVENOUS at 02:05

## 2022-05-10 RX ADMIN — APIXABAN 5 MG: 5 TABLET, FILM COATED ORAL at 08:05

## 2022-05-10 RX ADMIN — Medication 1 G: at 08:05
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
## 2022-05-10 NOTE — PROGRESS NOTES
Pharmacokinetic Assessment Follow Up: IV Vancomycin    Vancomycin serum concentration assessment(s):    The trough level was drawn correctly and can be used to guide therapy at this time. The measurement is within the desired definitive target range of 10 to 20 mcg/mL.    Vancomycin Regimen Plan:    Continue regimen to Vancomycin 1500 mg IV every 12 hours with next serum trough concentration measured at 0930 prior to 4th dose on 5/12    Drug levels (last 3 results):  Recent Labs   Lab Result Units 05/08/22  2126 05/10/22  0959   Vancomycin-Trough ug/mL 19.6 16.3       Pharmacy will continue to follow and monitor vancomycin.    Please contact pharmacy at extension [phone] for questions regarding this assessment.    Thank you for the consult,   Gerson Jhaveri       Patient brief summary:  Audrey Natarajan is a 51 y.o. female initiated on antimicrobial therapy with IV Vancomycin for treatment of skin & soft tissue infection    The patient's current regimen is 1500 mg q12h    Drug Allergies:   Review of patient's allergies indicates:   Allergen Reactions    Morphine Other (See Comments)     Patient had a psychotic episode after taking Morphine  Agitation, hallucinations    Penicillins Anaphylaxis     itching    Januvia [sitagliptin] Hives    Carbamazepine Other (See Comments)     hyponatremia       Actual Body Weight:   126 kg    Renal Function:   Estimated Creatinine Clearance: 115.6 mL/min (based on SCr of 0.8 mg/dL).,     Dialysis Method (if applicable):  N/A    CBC (last 72 hours):  Recent Labs   Lab Result Units 05/09/22  0555 05/10/22  0458   WBC K/uL 13.41* 15.49*   Hemoglobin g/dL 7.7* 8.4*   Hematocrit % 23.8* 26.7*   Platelets K/uL 655* 645*   Gran % % 54.9 53.7   Lymph % % 23.6 27.2   Mono % % 13.5 12.8   Eosinophil % % 2.6 2.3   Basophil % % 0.6 0.6   Differential Method  Automated Automated       Metabolic Panel (last 72 hours):  Recent Labs   Lab Result Units 05/07/22 2031 05/08/22  0511  05/09/22  0555 05/10/22  0458   Sodium mmol/L 128*  --  131* 133*   Potassium mmol/L 5.3*  --  5.6* 5.6*   Chloride mmol/L 96  --  99 102   CO2 mmol/L 25  --  24 23   Glucose mg/dL 173*  --  153* 164*   BUN mg/dL 9  --  10 11   Creatinine mg/dL 0.8  --  0.7 0.8   Albumin g/dL  --   --   --  2.3*   Total Bilirubin mg/dL  --   --   --  0.2   Alkaline Phosphatase U/L  --   --   --  96   AST U/L  --   --   --  7*   ALT U/L  --   --   --  11   Magnesium mg/dL  --  1.7 1.7 1.6       Vancomycin Administrations:  vancomycin given in the last 96 hours                     vancomycin 1.5 g in dextrose 5 % 250 mL IVPB (ready to mix) (mg) 1,500 mg New Bag 05/10/22 1106     1,500 mg New Bag 05/09/22 2304     1,500 mg New Bag  0950    vancomycin (VANCOCIN) 1,750 mg in dextrose 5 % 500 mL IVPB (mg) 1,750 mg New Bag 05/08/22 2203     1,750 mg New Bag  1109     1,750 mg New Bag 05/07/22 2256     1,750 mg New Bag  1300     1,750 mg New Bag 05/06/22 2242                    Microbiologic Results:  Microbiology Results (last 7 days)       Procedure Component Value Units Date/Time    Aerobic culture [[id number]] Collected: 05/06/22 1249    Order Status: Completed Specimen: Bone from Toe, Right Foot Updated: 05/10/22 0734     Aerobic Bacterial Culture No growth    AFB Culture & Smear [[id number]] Collected: 05/06/22 1249    Order Status: Completed Specimen: Bone from Toe, Right Foot Updated: 05/09/22 1429     AFB Culture & Smear Culture in progress     AFB CULTURE STAIN No acid fast bacilli seen.    Blood culture [[id number]] Collected: 05/06/22 1155    Order Status: Completed Specimen: Blood from Antecubital, Right Hand Updated: 05/09/22 1303     Blood Culture, Routine No Growth to date      No Growth to date      No Growth to date      No Growth to date    Blood culture [[id number]] Collected: 05/06/22 1150    Order Status: Completed Specimen: Blood from Peripheral, Left Hand Updated: 05/09/22 1303     Blood Culture, Routine No Growth to  date      No Growth to date      No Growth to date      No Growth to date    Blood culture x two cultures. Draw prior to antibiotics. [[id number]] Collected: 05/04/22 0947    Order Status: Completed Specimen: Blood from Peripheral, Antecubital, Left Updated: 05/08/22 1103     Blood Culture, Routine No Growth after 4 days.     Narrative:      Aerobic and anaerobic    Blood culture x two cultures. Draw prior to antibiotics. [[id number]]  (Abnormal)  (Susceptibility) Collected: 05/04/22 0944    Order Status: Completed Specimen: Blood from Peripheral, Antecubital, Right Updated: 05/07/22 0809     Blood Culture, Routine Gram stain abbe bottle: Gram positive cocci in clusters resembling Staph       Results called to and read back by: Linnette KRAMER 05/05/2022        09:58      METHICILLIN RESISTANT STAPHYLOCOCCUS AUREUS    Narrative:      Aerobic and anaerobic    Gram stain [[id number]] Collected: 05/06/22 1249    Order Status: Completed Specimen: Bone from Toe, Right Foot Updated: 05/06/22 1446     Gram Stain Result No organisms seen    Fungus culture [[id number]] Collected: 05/06/22 1249    Order Status: Sent Specimen: Bone from Toe, Right Foot Updated: 05/06/22 1302    Aerobic culture [[id number]]  (Abnormal)  (Susceptibility) Collected: 05/04/22 1500    Order Status: Completed Specimen: Wound from Foot, Right Updated: 05/06/22 1126     Aerobic Bacterial Culture Results called to and read back by: Linnette Alvarado 05/06/2022  08:40      ESCHERICHIA COLI  Many        ENTEROBACTER CLOACAE  Moderate        METHICILLIN RESISTANT STAPHYLOCOCCUS AUREUS  Many      Aerobic culture [[id number]]  (Abnormal)  (Susceptibility) Collected: 05/04/22 1500    Order Status: Completed Specimen: Wound from Foot, Left Updated: 05/06/22 0844     Aerobic Bacterial Culture Results called to and read back by: Linnette Alvarado 05/06/2022  08:40      METHICILLIN RESISTANT STAPHYLOCOCCUS AUREUS  Many

## 2022-05-10 NOTE — TRANSFER OF CARE
"Anesthesia Transfer of Care Note    Patient: Audrey Natarajan    Procedure(s) Performed: Procedure(s) (LRB):  DEBRIDEMENT, FOOT (Bilateral)    Patient location: PACU    Anesthesia Type: general and MAC    Transport from OR: Transported from OR on room air with adequate spontaneous ventilation    Post pain: adequate analgesia    Post assessment: no apparent anesthetic complications and tolerated procedure well    Post vital signs: stable    Level of consciousness: awake and alert    Nausea/Vomiting: no nausea/vomiting    Complications: none    Transfer of care protocol was followed      Last vitals:   Visit Vitals  BP (!) 157/69   Pulse 82   Temp 36.8 °C (98.3 °F) (Oral)   Resp 18   Ht 5' 6" (1.676 m)   Wt 126.4 kg (278 lb 10.6 oz)   LMP 11/01/2011 (LMP Unknown)   SpO2 96%   Breastfeeding No   BMI 44.98 kg/m²     "
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
## 2022-05-10 NOTE — ASSESSMENT & PLAN NOTE
-Imaging reviewed.  Pt appears to have adequate perfusion to heal wound.  Will continue to follow closely and perform angiography/revascularization if wound does not heal well.

## 2022-05-10 NOTE — SUBJECTIVE & OBJECTIVE
Interval History: appears to be in good spirits, feet are wrapped. Prepped for surgery today.    Review of Systems   Constitutional:  Negative for chills and fever.   Respiratory:  Negative for shortness of breath.    Cardiovascular:  Positive for leg swelling. Negative for chest pain.   Gastrointestinal:  Negative for abdominal pain, constipation, diarrhea, nausea and vomiting.   Genitourinary:  Negative for difficulty urinating.   Musculoskeletal:  Negative for arthralgias, back pain and myalgias.   Skin:  Positive for wound.   Neurological:  Positive for weakness and numbness.   Psychiatric/Behavioral:  Negative for confusion.    Objective:     Vital Signs (Most Recent):  Temp: 98 °F (36.7 °C) (05/10/22 1535)  Pulse: 75 (05/10/22 1535)  Resp: 17 (05/10/22 1535)  BP: (!) 149/67 (05/10/22 1535)  SpO2: (!) 93 % (05/10/22 1535) Vital Signs (24h Range):  Temp:  [98 °F (36.7 °C)-100 °F (37.8 °C)] 98 °F (36.7 °C)  Pulse:  [68-95] 75  Resp:  [17-20] 17  SpO2:  [93 %-98 %] 93 %  BP: (132-175)/(59-78) 149/67     Weight: 126.4 kg (278 lb 10.6 oz)  Body mass index is 44.98 kg/m².    Intake/Output Summary (Last 24 hours) at 5/10/2022 1610  Last data filed at 5/10/2022 1359  Gross per 24 hour   Intake 640 ml   Output --   Net 640 ml        Physical Exam  Vitals and nursing note reviewed.   Constitutional:       General: She is not in acute distress.     Appearance: She is obese. She is not ill-appearing or toxic-appearing.   HENT:      Head: Normocephalic and atraumatic.      Nose: Nose normal.      Mouth/Throat:      Mouth: Mucous membranes are moist.   Cardiovascular:      Rate and Rhythm: Normal rate and regular rhythm.      Heart sounds: Normal heart sounds. No murmur heard.    No gallop.      Comments: Unable to palpate pedal pulses due to dressings  Pulmonary:      Effort: Pulmonary effort is normal. No respiratory distress.      Breath sounds: Normal breath sounds. No wheezing or rales.      Comments: Room  air  Abdominal:      General: Bowel sounds are normal. There is no distension.      Palpations: Abdomen is soft.      Tenderness: There is no abdominal tenderness. There is no guarding.   Musculoskeletal:      Right lower leg: Edema present.      Left lower leg: Edema present.   Skin:     General: Skin is warm and dry.      Comments: Feet are bandaged   Neurological:      Mental Status: She is alert and oriented to person, place, and time.       Significant Labs: All pertinent labs within the past 24 hours have been reviewed.    Significant Imaging: I have reviewed all pertinent imaging results/findings within the past 24 hours.

## 2022-05-10 NOTE — NURSING
Report received from  DANK Giles. Patient ambulating in room and educated on bedrest precautions for now, no complaints, no acute distress noted. Plan of care reviewed with patient. Instructed patient to call for assistance before ambulating, side rails up x2, bed alarm set, call light in reach. Patient verbalized understanding of instructions.

## 2022-05-10 NOTE — NURSING
Kelli Kathleen, GABI notified of patient's scheduled debridement tomorrow and BID order for eliquis. Ordered to hold tonight's dose for scheduled procedure.

## 2022-05-10 NOTE — CONSULTS
[facility] - [place] - Telemetry  Vascular Surgery  Consult Note    Inpatient consult to Vascular Surgery  Consult performed by: Tone aMta MD  Consult ordered by: Maira De Los Santos DPM        Subjective:     Chief Complaint/Reason for Admission: BLE wounds    History of Present Illness:   HPI:  Audrey Natarajan is a 51 y.o. female with       Patient Active Problem List   Diagnosis    Essential hypertension    COPD (chronic obstructive pulmonary disease)    Hypertriglyceridemia    Tobacco abuse    Mild protein malnutrition    Diabetic neuropathy    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    Leg swelling    Incisional hernia without mention of obstruction or gangrene    DM type 2 without retinopathy    History of DVT (deep vein thrombosis)    History of pulmonary embolus (PE)    Bipolar 1 disorder    Gastroparesis due to DM    Thrombocytosis    Leukocytosis    Morbid obesity    Type 2 diabetes mellitus    Acne    Other chronic pain    Vomiting and diarrhea    Nuclear sclerosis of both eyes    Bilateral ocular hypertension    Refractive error    Long term (current) use of anticoagulants    Hypercoagulable state    History of pulmonary embolism    DVT, recurrent, lower extremity, chronic, left    Decreased ROM of ankle    Decreased strength of lower extremity    Balance problem    Gait abnormality    RUQ pain    Right upper quadrant abdominal pain    Leucocytosis    Fatty liver    Hepatomegaly    History of bariatric surgery    Need for prophylactic vaccination against hepatitis A and hepatitis B    Liver fibrosis    History of diabetic ulcer of foot    Cellulitis of left foot    Acute exacerbation of psychosis    Diabetic ulcer of left midfoot associated with type 2 diabetes mellitus, limited to breakdown of skin    Psychosis    being managed by PCP and specialists who is here today for evaluation of BLE pain.  9/1/19 presented to ER due to RLE cramping and LLE  "edema.  S/p LLE DVT 2003, unprovoked and treated with anticoagulation.  S/p PE and L femoral and PT DVT 2013 and had a Bard retrievable filter placed 2013; has had persistent pain and edema since that time.  Repeat US 9/1/19 in ER due to pain/edema showed chronic L PT.  Pt states 2013 after she injured her LLE and had a bleeding vein she had a "vein stripping" to the outside of her LLE.  Patient states location is BLE occurring for 6 yrs.  Associated signs and symptoms include discoloration.  Quality is sharp/throbbing and severity is 10/10 at worst, average 7/10.  Symptoms began 6 yrs ago.  Alleviating factors include elevation.  Worsening factors include dependency.  Denies claudication.       no MI  no Stroke  Tobacco use: 3 cig/day; prev 1 ppd x 35 yrs     12/2019: c/o severe LLE pain.  +compression daily.  C/o stockings being too tight.  States bilateral foot paresthesias.       3/2020:  Cont to c/o LLE pain.  Cannot tolerate compression.       5/2020:  C/o sharp LLE pain that limits her ambulating and sleep despite OTC pain medications.       8/2020:  Cont c/o LLE MSK and neuropathic pain.  +edema.  +compression/elevation > 3 months with continued decreased ability to perform ADLs and ambulation.     12/2020:  Cont to c/o LLE edema, pain despite compression and elevation > 3 mo.  Limiting her ADLs.     6/2021:  Presents with persistent BLE pain, edema and heaviness despite compression, elevation and diet changes > 3 mo limiting her ADLs.     9/2021:  S/p L GSV EVLT 7/30/21.  LLE feels better.    5/2022:  Developed BLE wounds 4 weeks ago.  States she was unable to obtain her Abx and wound care due to family taking her medication and not helping with dressing changes.      Facility-Administered Medications Prior to Admission   Medication Dose Route Frequency Provider Last Rate Last Admin    LIDOcaine HCL 10 mg/ml (1%) injection 1 mL  1 mL Other 1 time in Clinic/HOD Tone Mata MD        " LIDOcaine-EPINEPHrine 1%-1:100,000 30 mL, LIDOcaine HCL 10 mg/ml (1%) 20 mL, sodium bicarbonate 10 mL in sodium chloride 0.9% 500 mL solution   MISCELLANEOUS 1 time in Clinic/HOD Tone Mata MD         Medications Prior to Admission   Medication Sig Dispense Refill Last Dose    acetaminophen (TYLENOL) 500 MG tablet Take 2 tablets (1,000 mg total) by mouth every 6 (six) hours as needed for Pain. 30 tablet 0     albuterol (PROVENTIL/VENTOLIN HFA) 90 mcg/actuation inhaler Inhale 2 puffs into the lungs every 6 (six) hours as needed for Wheezing. Use with spacer  Dispense with 1 spacer 18 g 0 2022 at Unknown time    albuterol-ipratropium (DUO-NEB) 2.5 mg-0.5 mg/3 mL nebulizer solution Take 3 mLs by nebulization every 6 (six) hours as needed for Wheezing or Shortness of Breath. Rescue 1 Box 0 2022 at Unknown time    apixaban (ELIQUIS) 5 mg Tab Take 1 tablet (5 mg total) by mouth 2 (two) times daily. 30 tablet 3 2022 at Unknown time    aspirin 81 MG Chew Take 1 tablet (81 mg total) by mouth once daily. 30 tablet 11 2022 at Unknown time    dicyclomine (BENTYL) 20 mg tablet Take 1 tablet (20 mg total) by mouth every 6 (six) hours. 30 tablet 0 2022 at Unknown time    divalproex (DEPAKOTE) 250 MG EC tablet Take 5 tablets (1,250 mg total) by mouth every evening. 150 tablet 11 5/3/2022 at Unknown time    divalproex (DEPAKOTE) 500 MG TbEC Take 1 tablet (500 mg total) by mouth once daily. PO QAM 30 tablet 11 2022 at Unknown time    [] doxycycline (VIBRAMYCIN) 100 MG Cap Take 1 capsule (100 mg total) by mouth every 12 (twelve) hours. for 8 days 16 capsule 0 2022 at Unknown time    DUPIXENT  mg/2 mL PnIj SMARTSI Milligram(s) SUB-Q Every 2 Weeks       EPITOL 200 mg tablet Take 200 mg by mouth 2 (two) times a day.   2022 at Unknown time    famotidine (PEPCID) 20 MG tablet Take 20 mg by mouth 2 (two) times daily.   2022 at Unknown time    fluticasone  propionate (FLONASE) 50 mcg/actuation nasal spray 1 spray (50 mcg total) by Each Nostril route 2 (two) times daily. 16 g 0 2022 at Unknown time    furosemide (LASIX) 20 MG tablet TAKE 1 TABLET(20 MG) BY MOUTH EVERY DAY 90 tablet 1 2022 at Unknown time    gabapentin (NEURONTIN) 300 MG capsule TAKE 2 CAPSULES(600 MG) BY MOUTH TWICE DAILY 120 capsule 2 2022 at Unknown time    hydrOXYzine (ATARAX) 50 MG tablet Take 50 mg by mouth 4 (four) times daily as needed.   2022 at Unknown time    ibuprofen (ADVIL,MOTRIN) 600 MG tablet TAKE 1 TABLET(600 MG) BY MOUTH EVERY 8 HOURS AS NEEDED FOR PAIN OR BACK PAIN 90 tablet 0     lisinopriL 10 MG tablet Take 1 tablet (10 mg total) by mouth once daily. 90 tablet 3 2022 at Unknown time    loratadine (CLARITIN) 10 mg tablet Take 1 tablet (10 mg total) by mouth once daily. 60 tablet 0     magnesium oxide (MAG-OX) 400 mg (241.3 mg magnesium) tablet Take 1 tablet (400 mg total) by mouth 2 (two) times daily. 30 tablet 3 5/3/2022 at Unknown time    metFORMIN (GLUCOPHAGE) 1000 MG tablet TAKE 1 TABLET(1000 MG) BY MOUTH TWICE DAILY WITH MEALS 180 tablet 2 2022 at Unknown time    metoprolol tartrate (LOPRESSOR) 50 MG tablet TAKE 1 TABLET(50 MG) BY MOUTH TWICE DAILY 180 tablet 2 2022 at Unknown time    [] metroNIDAZOLE (FLAGYL) 500 MG tablet Take 1 tablet (500 mg total) by mouth every 8 (eight) hours. for 8 days 24 tablet 0 2022 at Unknown time    OLANZapine (ZYPREXA) 10 MG tablet Take 1 tablet (10 mg total) by mouth every 8 (eight) hours as needed (Agitation). 30 tablet 11 2022 at Unknown time    OPTICHAMBER BIGG LG MASK Spcr Inhale into the lungs.   2022 at Unknown time    pantoprazole (PROTONIX) 40 MG tablet Take 1 tablet (40 mg total) by mouth once daily. 30 tablet 0 2022 at Unknown time    polyvinyl alcohol, artificial tears, (LIQUIFILM TEARS) 1.4 % ophthalmic solution Place 1 drop into both eyes 4 (four) times daily. 15 mL  2 5/4/2022 at Unknown time    pravastatin (PRAVACHOL) 40 MG tablet TAKE 1 TABLET(40 MG) BY MOUTH EVERY EVENING 90 tablet 3 5/3/2022 at Unknown time    risperiDONE (RISPERDAL M-TABS) 3 MG disintegrating tablet Take 1 tablet (3 mg total) by mouth 2 (two) times daily. 60 tablet 11 5/4/2022 at Unknown time    senna (SENOKOT) 8.6 mg tablet Take 1 tablet by mouth 2 (two) times a day. 60 tablet 2 5/4/2022 at Unknown time    blood sugar diagnostic Strp To check BG two  times daily, to use with insurance preferred meter (Patient taking differently: To check BG two  times daily, to use with insurance preferred meter) 100 each 1     blood-glucose meter kit To check BG once daily, to use with insurance preferred meter 1 each 0     blood-glucose meter kit To check BG two times daily, to use with insurance preferred meter 1 each 0     fluticasone-salmeterol diskus inhaler 250-50 mcg Inhale 1 puff into the lungs 2 (two) times daily. Controller 60 each 2 Unknown at Unknown time    hydrOXYzine pamoate (VISTARIL) 50 MG Cap Take 1 capsule (50 mg total) by mouth 3 (three) times daily. 90 capsule 2     lancets Misc To check BG two times daily, to use with insurance preferred meter 100 each 1     multivitamin Tab Take 1 tablet by mouth once daily. 30 tablet 2 Unknown at Unknown time    TRUE METRIX GLUCOSE METER Misc CHECK BLOOD SUGAR TWICE DAILY 1 each 0        Review of patient's allergies indicates:   Allergen Reactions    Morphine Other (See Comments)     Patient had a psychotic episode after taking Morphine  Agitation, hallucinations    Penicillins Anaphylaxis     itching    Januvia [sitagliptin] Hives    Carbamazepine Other (See Comments)     hyponatremia       Past Medical History:   Diagnosis Date    ADHD (attention deficit hyperactivity disorder)     Arthritis     Asthma     Bipolar 1 disorder     Cataract     Cigarette smoker     COPD (chronic obstructive pulmonary disease)     Coronary artery disease      "A fib    Depression     bipolar manic depresson    Diabetes mellitus     Diabetic foot ulcers     Diabetic neuropathy     DVT of lower extremity, bilateral 07/2013    bilateral LE DVT. [place] filter placed.     Encounter for blood transfusion     History of blood clots 1. Left Leg=2003; 2.Bilateral Groin=Blood Clots= 5 or 6/ 2013 & 7/2013; 3. LLL of Lung=7/2013;  4. Lt. Lower Leg=7/2013.     Pt. had 1st Blood Clot after Isuxwrcgnzmo=[id number], & Last=2013. [place] Filter= Rt.Lateral Neck.    HTN (hypertension) 06/06/2013    Pt states that she does not have hypertension    Hypercholesteremia     Irregular heartbeat     Neuromuscular disorder     neuropathy feet    Obese     PE (pulmonary embolism) 07/2013    bilat LE DVT.     Restless leg syndrome      Past Surgical History:   Procedure Laterality Date    ABDOMINAL SURGERY  2010    gastric sleeve    BILATERAL OOPHORECTOMY Bilateral 1/12/2015    CHOLECYSTECTOMY      Green' s filter Right 7/4/2012    Right Neck & Tunneled Down.    HERNIA REPAIR      "[place] of Hernias Repaires around th [place]ly Button.", pt. states    LAPAROSCOPIC CHOLECYSTECTOMY N/A 9/10/2020    Procedure: CHOLECYSTECTOMY, LAPAROSCOPIC;  Surgeon: Montrell Gutierrez MD;  Location: [facility];  Service: General;  Laterality: N/A;  RN PREOP 9/9----COVID Negative  9/9    OVARIAN CYST REMOVAL  3/13/2014    OK REMOVAL OF OVARY/TUBE(S)      SPLENECTOMY, TOTAL  July 2003    TONSILLECTOMY      as a child    TYMPANOSTOMY TUBE PLACEMENT  1976    VEIN SURGERY  2003    Lt leg     Family History       Problem Relation (Age of Onset)    Cataracts Father    Diabetes Father, Paternal Grandfather    Heart disease Father, Paternal Grandfather    Hypertension Father    No Known Problems Mother, Sister, Brother, Maternal Aunt, Maternal Uncle, Paternal Aunt, Paternal Uncle, Maternal Grandfather    Ovarian cancer Maternal Grandmother, Paternal Grandmother          Tobacco Use    Smoking status: Current " Every Day Smoker     Packs/day: 1.00     Years: 37.00     Pack years: 37.00     Types: Cigarettes     Last attempt to quit: 2020     Years since quittin.4    Smokeless tobacco: Never Used    Tobacco comment: Enrolled in the [employer] on 5/3/14 ([facility] Member ID # [id number]). Ambulatory referral to Smoking Cessation Program   Substance and Sexual Activity    Alcohol use: No     Alcohol/week: 0.0 standard drinks    Drug use: No    Sexual activity: Yes     Partners: Male     Review of Systems   Constitutional:  Negative for chills.   HENT:  Negative for congestion.    Eyes:  Negative for visual disturbance.   Respiratory:  Negative for shortness of breath.    Cardiovascular:  Negative for chest pain.   Gastrointestinal:  Negative for abdominal distention.   Endocrine: Negative for cold intolerance.   Genitourinary:  Negative for flank pain.   Musculoskeletal:  Negative for back pain.   Skin:  Negative for pallor and rash.   Allergic/Immunologic: Negative for immunocompromised state.   Neurological:  Negative for dizziness.   Hematological:  Does not bruise/bleed easily.   Psychiatric/Behavioral:  Negative for agitation.    Objective:     Vital Signs (Most Recent):  Temp: 98.2 °F (36.8 °C) (22)  Pulse: 83 (22)  Resp: 20 (22)  BP: (!) 170/77 (22)  SpO2: 98 % (22)   Vital Signs (24h Range):  Temp:  [98.1 °F (36.7 °C)-100 °F (37.8 °C)] 98.2 °F (36.8 °C)  Pulse:  [61-90] 83  Resp:  [17-20] 20  SpO2:  [96 %-99 %] 98 %  BP: (139-175)/(58-77) 170/77     Weight: 118.5 kg (261 lb 3.9 oz)  Body mass index is 42.17 kg/m².    Physical Exam  Vitals reviewed.   Constitutional:       General: She is not in acute distress.     Appearance: She is well-developed. She is not diaphoretic.   HENT:      Head: Normocephalic and atraumatic.   Eyes:      Conjunctiva/sclera: Conjunctivae normal.   Cardiovascular:      Rate and Rhythm: Normal rate.      Pulses:            Femoral pulses are 2+ on the right side and 2+ on the left side.       Dorsalis pedis pulses are detected w/ Doppler on the right side and detected w/ Doppler on the left side.        Posterior tibial pulses are detected w/ Doppler on the right side and detected w/ Doppler on the left side.   Pulmonary:      Effort: Pulmonary effort is normal.   Abdominal:      General: There is no distension.      Palpations: Abdomen is soft. There is no mass.      Tenderness: There is no abdominal tenderness. There is no guarding or rebound.      Hernia: No hernia is present.   Musculoskeletal:         General: No deformity. Normal range of motion.      Cervical back: Neck supple.   Feet:      Right foot:      Skin integrity: Ulcer present.      Left foot:      Skin integrity: Ulcer present.   Skin:     Findings: No rash.   Neurological:      Mental Status: She is alert and oriented to person, place, and time.       Significant Labs:  All pertinent labs from the last 24 hours have been reviewed.    Significant Diagnostics:  I have reviewed all pertinent imaging results/findings within the past 24 hours.    Assessment/Plan:     Diabetic foot ulcer associated with type 2 diabetes mellitus  -Imaging reviewed.  Pt appears to have adequate perfusion to heal wound.  Will continue to follow closely and perform angiography/revascularization if wound does not heal well        Thank you for your consult. I will follow-up with patient. Please contact us if you have any additional questions.    Tone Mata MD  Vascular Surgery  [facility] [place] - [facility]

## 2022-05-10 NOTE — PLAN OF CARE
05/10/22 1351   Discharge Reassessment   Assessment Type Discharge Planning Reassessment   Did the patient's condition or plan change since previous assessment? No   Message in careport from [facility] Phone: [phone] that acceptance is pending administrative approval.

## 2022-05-10 NOTE — PLAN OF CARE
Problem: Adult Inpatient Plan of Care  Goal: Absence of Hospital-Acquired Illness or Injury  Intervention: Prevent Skin Injury  Flowsheets (Taken 5/10/2022 0325)  Body Position: position changed independently  Skin Protection:   tubing/devices free from skin contact   skin-to-skin areas padded     Problem: Adult Inpatient Plan of Care  Goal: Absence of Hospital-Acquired Illness or Injury  Intervention: Identify and Manage Fall Risk  Flowsheets (Taken 5/10/2022 0325)  Safety Promotion/Fall Prevention: bed alarm set     Problem: Adult Inpatient Plan of Care  Goal: Plan of Care Review  Outcome: Ongoing, Progressing  Flowsheets (Taken 5/10/2022 0325)  Plan of Care Reviewed With: patient     Problem: Adult Inpatient Plan of Care  Goal: Absence of Hospital-Acquired Illness or Injury  Intervention: Prevent and Manage VTE (Venous Thromboembolism) Risk  Flowsheets (Taken 5/10/2022 0325)  VTE Prevention/Management:   ROM (passive) performed   ROM (active) performed  Range of Motion:   active ROM (range of motion) encouraged   ROM (range of motion) performed     Problem: Adult Inpatient Plan of Care  Goal: Optimal Comfort and Wellbeing  Intervention: Monitor Pain and Promote Comfort  Flowsheets (Taken 5/10/2022 0325)  Pain Management Interventions: relaxation techniques promoted     Problem: Adult Inpatient Plan of Care  Goal: Readiness for Transition of Care  Outcome: Ongoing, Progressing     Problem: Skin Injury Risk Increased  Goal: Skin Health and Integrity  Outcome: Ongoing, Progressing  Intervention: Optimize Skin Protection  Flowsheets (Taken 5/10/2022 0325)  Pressure Reduction Techniques: frequent weight shift encouraged  Skin Protection:   tubing/devices free from skin contact   skin-to-skin areas padded     Problem: Fall Injury Risk  Goal: Absence of Fall and Fall-Related Injury  Outcome: Ongoing, Progressing     Problem: Fluid and Electrolyte Imbalance (Acute Kidney Injury/Impairment)  Goal: Fluid and Electrolyte  Balance  Outcome: Ongoing, Progressing

## 2022-05-10 NOTE — PLAN OF CARE
Pt free from fall/injury throughout shift, tolerated bilateral foot debridement procedure well, dsgs remains dry, intact, in heel boots elevated. Denies any pain, no distress noted.

## 2022-05-10 NOTE — ANESTHESIA POSTPROCEDURE EVALUATION
Anesthesia Post Evaluation    Patient: Audrey Natarajan    Procedure(s) Performed: Procedure(s) (LRB):  DEBRIDEMENT, FOOT (Bilateral)    Final Anesthesia Type: MAC      Patient location during evaluation: PACU  Patient participation: Yes- Able to Participate  Level of consciousness: awake and alert  Post-procedure vital signs: reviewed and stable  Pain management: adequate  Airway patency: patent    PONV status at discharge: No PONV  Anesthetic complications: no      Cardiovascular status: blood pressure returned to baseline  Respiratory status: unassisted and spontaneous ventilation  Hydration status: euvolemic  Follow-up not needed.          Vitals Value Taken Time   /73 05/10/22 1417   Temp 36.8 °C (98.3 °F) 05/10/22 1359   Pulse 79 05/10/22 1422   Resp 25 05/10/22 1422   SpO2 93 % 05/10/22 1422   Vitals shown include unvalidated device data.      No case tracking events are documented in the log.      Pain/Kristi Score: Pain Rating Prior to Med Admin: 7 (5/10/2022  2:14 PM)  Pain Rating Post Med Admin: 0 (5/9/2022  8:55 PM)  Kristi Score: 10 (5/10/2022  2:20 PM)

## 2022-05-10 NOTE — PLAN OF CARE
Patient to Return to floor with orders to ice and elevate surgical limb for the next 24 hours.     She will be nonweightbearing to the left foot and use walker or crutch assistance with transfers. XL Cam boot will need to come from DME. She will elevate bilateral lower extremities and rest the foot, keep it clean and dry

## 2022-05-10 NOTE — ASSESSMENT & PLAN NOTE
"51F with h/o T2DM, biopolar disorder admitted 5/4 for b/l foot wounds. Of note, recently discharged from [facility] on 4/26/22 and patient reported unable to take medications as family member was hiding meds from her. S/p debridement with podiatry. Pt refuses amputation    Right plantar hallux- probe to periosteum fibrotic base  Left plantar foot ulceration with necrotic base deep probe to bone    MRI L foot- charcot changes, no abscess. Can not exclude septic arthritis of midfoot.    MRI R foot-  osteomyelitis of 1st digit    R wound swab- e.coli, enterobacter, mrsa  L wound swab- MRSA    BCx 5/4 MRSA, repeat 5/6 NGTD. 2d echo neg for vegetations.    R foot bone biopsy- culture NGTD. Path "Viable bone without inflammatory infiltrates"    Has received vanc/cipro/metronidazole since 5/4 (day #6)    JEYSON with  hemodynamically significant stenosis in the left and DPA. Multifocal mild to moderate grade stenoses is suspected throughout the left MIRACLE and, bilateral posterior tibial and peroneal arteries.    Needs at minimum 4 weeks iv vancomycin for complicated mrsa bacteremia. The bacteremia originated from foot, so MRSA OM likely present, although the mri changes could also be explained from stenosis. the polymicrobial wound culture was from bedside swab and likely represents colonization and wound    Recommendations:   - 6 weeks iv vancomycin for osteomyelitis/mrsa bacteremia  -  consider stopping cipro/metronidazole  - needs adequate perfusion to heal wound  - consult SW for placement. Not candidate for home iv abx.    Outpatient Antibiotic Therapy Plan:    Please send referral to [facility].    1) Infection: complicated mrsa bacteremia, possible MRSA osteomyelitis foot    2) Discharge Antibiotics:    Intravenous antibiotics:   Iv vancomycin, pharmacy to dose      3) Therapy Duration:  6 weeks    Estimated end date of IV antibiotics: 6/14/22    4) Outpatient Weekly Labs:    Order the " following labs to be drawn on Mondays:    CBC   CMP    ESR    CRP   Vancomycin trough. Target 15-20    If discharged on vancomycin IV, order the following additional labs to be drawn on Thursdays:   CMP    Vancomycin trough. Target 15-20    If vancomycin trough is not at target (15-20) prior to discharge, schedule vancomycin trough to be drawn before their fourth outpatient dose.    5) Fax Lab Results to Infectious Diseases Provider: Dr Zavala    [facility] Fax Number: [phone]    6) Outpatient Infectious Diseases Follow-up     Follow-up appointment will be arranged by the ID clinic and will be found in the patient's appointments tab.     Prior to discharge, please ensure the patient's follow-up has been scheduled.     If there is still no follow-up scheduled prior to discharge, please send an EPIC message to Leidy Brooks in Infectious Diseases.             1 week

## 2022-05-10 NOTE — SUBJECTIVE & OBJECTIVE
Facility-Administered Medications Prior to Admission   Medication Dose Route Frequency Provider Last Rate Last Admin    LIDOcaine HCL 10 mg/ml (1%) injection 1 mL  1 mL Other 1 time in Clinic/HOD Tone Mata MD        LIDOcaine-EPINEPHrine 1%-1:100,000 30 mL, LIDOcaine HCL 10 mg/ml (1%) 20 mL, sodium bicarbonate 10 mL in sodium chloride 0.9% 500 mL solution   MISCELLANEOUS 1 time in Clinic/HOD Tone Mata MD         Medications Prior to Admission   Medication Sig Dispense Refill Last Dose    acetaminophen (TYLENOL) 500 MG tablet Take 2 tablets (1,000 mg total) by mouth every 6 (six) hours as needed for Pain. 30 tablet 0     albuterol (PROVENTIL/VENTOLIN HFA) 90 mcg/actuation inhaler Inhale 2 puffs into the lungs every 6 (six) hours as needed for Wheezing. Use with spacer  Dispense with 1 spacer 18 g 0 2022 at Unknown time    albuterol-ipratropium (DUO-NEB) 2.5 mg-0.5 mg/3 mL nebulizer solution Take 3 mLs by nebulization every 6 (six) hours as needed for Wheezing or Shortness of Breath. Rescue 1 Box 0 2022 at Unknown time    apixaban (ELIQUIS) 5 mg Tab Take 1 tablet (5 mg total) by mouth 2 (two) times daily. 30 tablet 3 2022 at Unknown time    aspirin 81 MG Chew Take 1 tablet (81 mg total) by mouth once daily. 30 tablet 11 2022 at Unknown time    dicyclomine (BENTYL) 20 mg tablet Take 1 tablet (20 mg total) by mouth every 6 (six) hours. 30 tablet 0 2022 at Unknown time    divalproex (DEPAKOTE) 250 MG EC tablet Take 5 tablets (1,250 mg total) by mouth every evening. 150 tablet 11 5/3/2022 at Unknown time    divalproex (DEPAKOTE) 500 MG TbEC Take 1 tablet (500 mg total) by mouth once daily. PO QAM 30 tablet 11 2022 at Unknown time    [] doxycycline (VIBRAMYCIN) 100 MG Cap Take 1 capsule (100 mg total) by mouth every 12 (twelve) hours. for 8 days 16 capsule 0 2022 at Unknown time    DUPIXENT  mg/2 mL PnIj SMARTSI Milligram(s) SUB-Q Every 2 Weeks        EPITOL 200 mg tablet Take 200 mg by mouth 2 (two) times a day.   2022 at Unknown time    famotidine (PEPCID) 20 MG tablet Take 20 mg by mouth 2 (two) times daily.   2022 at Unknown time    fluticasone propionate (FLONASE) 50 mcg/actuation nasal spray 1 spray (50 mcg total) by Each Nostril route 2 (two) times daily. 16 g 0 2022 at Unknown time    furosemide (LASIX) 20 MG tablet TAKE 1 TABLET(20 MG) BY MOUTH EVERY DAY 90 tablet 1 2022 at Unknown time    gabapentin (NEURONTIN) 300 MG capsule TAKE 2 CAPSULES(600 MG) BY MOUTH TWICE DAILY 120 capsule 2 2022 at Unknown time    hydrOXYzine (ATARAX) 50 MG tablet Take 50 mg by mouth 4 (four) times daily as needed.   2022 at Unknown time    ibuprofen (ADVIL,MOTRIN) 600 MG tablet TAKE 1 TABLET(600 MG) BY MOUTH EVERY 8 HOURS AS NEEDED FOR PAIN OR BACK PAIN 90 tablet 0     lisinopriL 10 MG tablet Take 1 tablet (10 mg total) by mouth once daily. 90 tablet 3 2022 at Unknown time    loratadine (CLARITIN) 10 mg tablet Take 1 tablet (10 mg total) by mouth once daily. 60 tablet 0     magnesium oxide (MAG-OX) 400 mg (241.3 mg magnesium) tablet Take 1 tablet (400 mg total) by mouth 2 (two) times daily. 30 tablet 3 5/3/2022 at Unknown time    metFORMIN (GLUCOPHAGE) 1000 MG tablet TAKE 1 TABLET(1000 MG) BY MOUTH TWICE DAILY WITH MEALS 180 tablet 2 2022 at Unknown time    metoprolol tartrate (LOPRESSOR) 50 MG tablet TAKE 1 TABLET(50 MG) BY MOUTH TWICE DAILY 180 tablet 2 2022 at Unknown time    [] metroNIDAZOLE (FLAGYL) 500 MG tablet Take 1 tablet (500 mg total) by mouth every 8 (eight) hours. for 8 days 24 tablet 0 2022 at Unknown time    OLANZapine (ZYPREXA) 10 MG tablet Take 1 tablet (10 mg total) by mouth every 8 (eight) hours as needed (Agitation). 30 tablet 11 2022 at Unknown time    [facility] Inhale into the lungs.   2022 at Unknown time    pantoprazole (PROTONIX) 40 MG tablet Take 1 tablet (40 mg total) by  mouth once daily. 30 tablet 0 5/4/2022 at Unknown time    polyvinyl alcohol, artificial tears, (LIQUIFILM TEARS) 1.4 % ophthalmic solution Place 1 drop into both eyes 4 (four) times daily. 15 mL 2 5/4/2022 at Unknown time    pravastatin (PRAVACHOL) 40 MG tablet TAKE 1 TABLET(40 MG) BY MOUTH EVERY EVENING 90 tablet 3 5/3/2022 at Unknown time    risperiDONE (RISPERDAL M-TABS) 3 MG disintegrating tablet Take 1 tablet (3 mg total) by mouth 2 (two) times daily. 60 tablet 11 5/4/2022 at Unknown time    senna (SENOKOT) 8.6 mg tablet Take 1 tablet by mouth 2 (two) times a day. 60 tablet 2 5/4/2022 at Unknown time    blood sugar diagnostic Strp To check BG two  times daily, to use with insurance preferred meter (Patient taking differently: To check BG two  times daily, to use with insurance preferred meter) 100 each 1     blood-glucose meter kit To check BG once daily, to use with insurance preferred meter 1 each 0     blood-glucose meter kit To check BG two times daily, to use with insurance preferred meter 1 each 0     fluticasone-salmeterol diskus inhaler 250-50 mcg Inhale 1 puff into the lungs 2 (two) times daily. Controller 60 each 2 Unknown at Unknown time    hydrOXYzine pamoate (VISTARIL) 50 MG Cap Take 1 capsule (50 mg total) by mouth 3 (three) times daily. 90 capsule 2     lancets Misc To check BG two times daily, to use with insurance preferred meter 100 each 1     multivitamin Tab Take 1 tablet by mouth once daily. 30 tablet 2 Unknown at Unknown time    TRUE METRIX GLUCOSE METER Misc CHECK BLOOD SUGAR TWICE DAILY 1 each 0        Review of patient's allergies indicates:   Allergen Reactions    Morphine Other (See Comments)     Patient had a psychotic episode after taking Morphine  Agitation, hallucinations    Penicillins Anaphylaxis     itching    Januvia [sitagliptin] Hives    Carbamazepine Other (See Comments)     hyponatremia       Past Medical History:   Diagnosis Date    ADHD (attention deficit hyperactivity  "disorder)     Arthritis     Asthma     Bipolar 1 disorder     Cataract     Cigarette smoker     COPD (chronic obstructive pulmonary disease)     Coronary artery disease     A fib    Depression     bipolar manic depresson    Diabetes mellitus     Diabetic foot ulcers     Diabetic neuropathy     DVT of lower extremity, bilateral 07/2013    bilateral LE DVT. [place] filter placed.     Encounter for blood transfusion     History of blood clots 1. Left Leg=2003; 2.Bilateral Groin=Blood Clots= 5 or 6/ 2013 & 7/2013; 3. LLL of Lung=7/2013;  4. Lt. Lower Leg=7/2013.     Pt. had 1st Blood Clot after Nbuqecixvwac=4385, & Last=2013. [place] Filter= Rt.Lateral Neck.    HTN (hypertension) 06/06/2013    Pt states that she does not have hypertension    Hypercholesteremia     Irregular heartbeat     Neuromuscular disorder     neuropathy feet    Obese     PE (pulmonary embolism) 07/2013    bilat LE DVT.     Restless leg syndrome      Past Surgical History:   Procedure Laterality Date    ABDOMINAL SURGERY  2010    gastric sleeve    BILATERAL OOPHORECTOMY Bilateral 1/12/2015    CHOLECYSTECTOMY      Green' s filter Right 7/4/2012    Right Neck & Tunneled Down.    HERNIA REPAIR      "[place] [facility] Hernias Repaires around th [place].", pt. states    LAPAROSCOPIC CHOLECYSTECTOMY N/A 9/10/2020    Procedure: CHOLECYSTECTOMY, LAPAROSCOPIC;  Surgeon: Montrell Gutierrez MD;  Location: [facility] OR;  Service: General;  Laterality: N/A;  RN PREOP 9/9----COVID Negative  9/9    OVARIAN CYST REMOVAL  3/13/2014    NC REMOVAL OF OVARY/TUBE(S)      SPLENECTOMY, TOTAL  July 2003    TONSILLECTOMY      as a child    TYMPANOSTOMY TUBE PLACEMENT  1976    VEIN SURGERY  2003    Lt leg     Family History       Problem Relation (Age of Onset)    Cataracts Father    Diabetes Father, Paternal Grandfather    Heart disease Father, Paternal Grandfather    Hypertension Father    No Known Problems Mother, Sister, Brother, Maternal Aunt, Maternal Uncle, Paternal Aunt, " Paternal Uncle, Maternal Grandfather    Ovarian cancer Maternal Grandmother, Paternal Grandmother          Tobacco Use    Smoking status: Current Every Day Smoker     Packs/day: 1.00     Years: 37.00     Pack years: 37.00     Types: Cigarettes     Last attempt to quit: 2020     Years since quittin.4    Smokeless tobacco: Never Used    Tobacco comment: Enrolled in the [employer] [place] on 5/3/14 ([facility] Member ID # [id number]). Ambulatory referral to Smoking Cessation Program   Substance and Sexual Activity    Alcohol use: No     Alcohol/week: 0.0 standard drinks    Drug use: No    Sexual activity: Yes     Partners: Male     Review of Systems   Constitutional:  Negative for chills.   HENT:  Negative for congestion.    Eyes:  Negative for visual disturbance.   Respiratory:  Negative for shortness of breath.    Cardiovascular:  Negative for chest pain.   Gastrointestinal:  Negative for abdominal distention.   Endocrine: Negative for cold intolerance.   Genitourinary:  Negative for flank pain.   Musculoskeletal:  Negative for back pain.   Skin:  Negative for pallor and rash.   Allergic/Immunologic: Negative for immunocompromised state.   Neurological:  Negative for dizziness.   Hematological:  Does not bruise/bleed easily.   Psychiatric/Behavioral:  Negative for agitation.    Objective:     Vital Signs (Most Recent):  Temp: 98.5 °F (36.9 °C) (05/10/22 164)  Pulse: 81 (05/10/22 164)  Resp: 18 (05/10/22 164)  BP: (!) 144/66 (05/10/22 164)  SpO2: (!) 94 % (05/10/22 164)   Vital Signs (24h Range):  Temp:  [98 °F (36.7 °C)-100 °F (37.8 °C)] 98.5 °F (36.9 °C)  Pulse:  [68-95] 81  Resp:  [17-20] 18  SpO2:  [93 %-98 %] 94 %  BP: (132-175)/(59-78) 144/66     Weight: 126.4 kg (278 lb 10.6 oz)  Body mass index is 44.98 kg/m².    Physical Exam  Vitals reviewed.   Constitutional:       General: She is not in acute distress.     Appearance: She is well-developed. She is not diaphoretic.   HENT:      Head: Normocephalic and  atraumatic.   Eyes:      Conjunctiva/sclera: Conjunctivae normal.   Cardiovascular:      Rate and Rhythm: Normal rate.      Pulses:           Femoral pulses are 2+ on the right side and 2+ on the left side.       Dorsalis pedis pulses are detected w/ Doppler on the right side and detected w/ Doppler on the left side.        Posterior tibial pulses are detected w/ Doppler on the right side and detected w/ Doppler on the left side.   Pulmonary:      Effort: Pulmonary effort is normal.   Abdominal:      General: There is no distension.      Palpations: Abdomen is soft. There is no mass.      Tenderness: There is no abdominal tenderness. There is no guarding or rebound.      Hernia: No hernia is present.   Musculoskeletal:         General: No deformity. Normal range of motion.      Cervical back: Neck supple.   Feet:      Right foot:      Skin integrity: Ulcer present.      Left foot:      Skin integrity: Ulcer present.   Skin:     Findings: No rash.   Neurological:      Mental Status: She is alert and oriented to person, place, and time.       Significant Labs:  All pertinent labs from the last 24 hours have been reviewed.    Significant Diagnostics:  I have reviewed all pertinent imaging results/findings within the past 24 hours.

## 2022-05-10 NOTE — PROGRESS NOTES
Pt fully recovered in Phase I without any  acute distress. Transfer to floor in a stable condition.

## 2022-05-10 NOTE — OP NOTE
Irrigation and Debridement    Surgery Date: 5/10/2022     Surgeon(s) and Role:     * Maira De Los Santos DPM - Primary    Assisting Surgeon: None    Pre-op Diagnosis:  Wound infection [T14.8XXA, L08.9]    Post-op Diagnosis:  Post-Op Diagnosis Codes:     * Wound infection [T14.8XXA, L08.9]    Procedure(s) (LRB):  DEBRIDEMENT, FOOT (Bilateral)    Anesthesia: Local MAC    Description of the findings of the procedure: fibro granular wound bases    Estimated Blood Loss:  Less than 10mL         Specimens:   Specimen (24h ago, onward)            None          Hemostasis: anatomic dissection    Procedure in Detail:  The patient was seen in the Holding Room. The risks, benefits, complications, treatment options, and expected outcomes were discussed with the patient. The risks and potential complications of their problem and purposed treatment include but are not limited to infection, nerve injury, vascular injury, pain, potential skin necrosis, deep vein thrombosis, possible pulmonary embolus, complications of the anesthetics and need for further amputation.  The patient concurred with the proposed plan, giving informed consent.  The site of surgery properly noted/marked. The patient was taken to Operating Room #5 identified as Audrey Natarajan and the procedure verified as irrigation and debridement of the  bilateral foot. A Time Out was held and the above information confirmed.    The patient was brought to the operating room, placed on the operating table in a supine position. Following the successful induction of anesthesia, the foot was then scrubbed, prepped, and draped in the usual aseptic manner.       Wound Debridement    Date/Time: 5/10/2022 9:16 AM  Performed by: Maira De Los Santos DPM  Authorized by: Maira De Lso Santos DPM       Wound Details:    Location:  Left foot    Location:  Left Plantar    Type of Debridement:  Excisional       Length (cm):  5.5       Area (sq cm):  44       Width (cm):  8       Percent Debrided  (%):  100       Depth (cm):  1.2       Total Area Debrided (sq cm):  44    Depth of debridement:  Muscle/fascia/tendon    Tissue debrided:  Dermis, Epidermis, Subcutaneous and Fascia    Devitalized tissue debrided:  Fibrin    Debridement - 1st Wound - Instruments: Misonix ultrasonic surgical debrider.    Additional wounds:  1    2nd Wound Details:     Location:  Right foot    Location:  Right 1st Toe    Location:  Right 1st Toe    Type of Debridement:  Excisional       Length (cm):  2.3       Area (sq cm):  1.61       Width (cm):  0.7       Percent Debrided (%):  100       Depth (cm):  0.5       Total Area Debrided (sq cm):  1.61    Depth of debridement:  Subcutaneous tissue    Tissue debrided:  Dermis, Epidermis and Subcutaneous    Devitalized tissue debrided:  Callus and Fibrin    Debridement - 2nd Wound - Instruments: Misonix ultrasonic surgical debrider.    3rd Wound Details:     Location:  Left foot    Location:  Left 2nd Toe    Location:  Left 2nd Toe    Type of Debridement:  Excisional       Length (cm):  3       Area (sq cm):  7.5       Width (cm):  2.5       Percent Debrided (%):  100       Depth (cm):  0.3       Total Area Debrided (sq cm):  7.5    Depth of debridement:  Muscle/fascia/tendon    Tissue debrided:  Dermis, Epidermis, Tendon and Subcutaneous    Devitalized tissue debrided:  Callus and Fibrin    Debridement - 3rd Wound - Instruments: Misonix ultrasonic surgical debrider.    4th Wound Details:     Location:  Right foot    Location:  Right Plantar    Location:  Right Plantar    Type of Debridement:  Excisional       Length (cm):  5       Area (sq cm):  10       Width (cm):  2       Percent Debrided (%):  100       Depth (cm):  0.2       Total Area Debrided (sq cm):  10    Depth of debridement:  Subcutaneous tissue    Tissue debrided:  Dermis, Epidermis and Subcutaneous    Devitalized tissue debrided:  Callus and Fibrin    Debridement - 4th Wound - Instruments: Misonix ultrasonic surgical  debrider.    Bleeding:  Moderate  Hemostasis Achieved: Yes    Method Used:  Pressure (3-0 prolene to the right 2nd digit)  Patient tolerance:  Patient tolerated the procedure well with no immediate complications        No purulent drainage or abscess remained. The proximal margin of the surgical site tissue was inspected and appeared viable and healthy.     A standard postoperative dressing was applied consisting of betadine soaked adaptic, gauze, 4x4s, Kerlix,  Cast padding and coban. The patient was transported via cart to Postanesthesia Care Unit with vital signs able and vascular status intact to each foot. She will be readmitted to the floor where we will continue to follow . Plan is to continue the antibiotics until further ID recommendations.    She will be nonweightbearing to the left foot and use walker or crutch assistance. XL Cam boot will need to come from [facility]. She will elevate bilateral lower extremities and rest the foot, keep it clean and dry           Implants: none           Complications:  None; patient tolerated the procedure well.           Disposition: PACU - hemodynamically stable. Then back to floor           Condition: stable

## 2022-05-10 NOTE — ASSESSMENT & PLAN NOTE
-Imaging reviewed.  Pt appears to have adequate perfusion to heal wound.  Will continue to follow closely and perform angiography/revascularization if wound does not heal well

## 2022-05-10 NOTE — PROGRESS NOTES
[facility]  Podiatry  Progress Note    Patient Name: Audrey Natarajan  MRN: [id number]  Admission Date: 5/4/2022  Hospital Length of Stay: 6 days  Attending Physician: Scott Fuller MD  Primary Care Provider: Donaldo Pena MD     Subjective:     History of Present Illness: 52 y/o female PMH DM2, bipolar admitted for wound infection B/L. Patient recently discharged from [facility] on 4/26/22. Patient reports unable to take medications as family member was hiding meds from her. Seen earlier today in podiatry clinic Dr. De Los Santos, sent to ED for admission.     5/6/22: Patient seen bedside. Bandages intact B/L    5/8/2022 patient seen at bedside resting comfortably.  Dressing somewhat disheveled because she relates that she walks on floor without any type of shoe.  She relates feeling overall better.  No new pedal complaints.    5/9/22: Patient seen bedside. Per nurse patient declined heel protector boots. Only has one DARCO shoe at bedside. Contacted nurse to order additional DARCO shoe.     5/10/2022 Patient seen bedside. Resting comfortably. No new pedal complaints    Scheduled Meds:   apixaban  5 mg Oral BID    aspirin  81 mg Oral Daily    cetirizine  5 mg Oral Daily    ciprofloxacin HCl  500 mg Oral Q12H    divalproex  1,250 mg Oral QHS    divalproex  500 mg Oral Daily    fludrocortisone  100 mcg Oral Daily    fluticasone furoate-vilanteroL  1 puff Inhalation Daily    fluticasone propionate  2 spray Each Nostril Daily    hydrOXYzine pamoate  50 mg Oral TID    metoprolol tartrate  50 mg Oral BID    metronidazole  500 mg Intravenous Q8H    pantoprazole  40 mg Oral Daily    polyethylene glycol  17 g Oral Daily    pravastatin  40 mg Oral QHS    risperiDONE  2 mg Oral Daily    risperiDONE  3 mg Oral QHS    senna-docusate 8.6-50 mg  2 tablet Oral BID    sodium chloride  1 g Oral QID    sodium zirconium cyclosilicate  5 g Oral Daily    vancomycin (VANCOCIN) IVPB  1,500 mg Intravenous Q12H      Continuous Infusions:    PRN Meds:sodium chloride, acetaminophen, dextrose 10%, dextrose 10%, glucagon (human recombinant), glucagon (human recombinant), glucose, glucose, insulin aspart U-100, naloxone, naloxone, OLANZapine, oxyCODONE-acetaminophen, sodium chloride 0.9%, sodium chloride 0.9%, Pharmacy to dose Vancomycin consult **AND** vancomycin - pharmacy to dose    Review of patient's allergies indicates:   Allergen Reactions    Morphine Other (See Comments)     Patient had a psychotic episode after taking Morphine  Agitation, hallucinations    Penicillins Anaphylaxis     itching    Januvia [sitagliptin] Hives    Carbamazepine Other (See Comments)     hyponatremia        Past Medical History:   Diagnosis Date    ADHD (attention deficit hyperactivity disorder)     Arthritis     Asthma     Bipolar 1 disorder     Cataract     Cigarette smoker     COPD (chronic obstructive pulmonary disease)     Coronary artery disease     A fib    Depression     bipolar manic depresson    Diabetes mellitus     Diabetic foot ulcers     Diabetic neuropathy     DVT of lower extremity, bilateral 07/2013    bilateral LE DVT. [place] filter placed.     Encounter for blood transfusion     History of blood clots 1. Left Leg=2003; 2.Bilateral Groin=Blood Clots= 5 or 6/ 2013 & 7/2013; 3. LLL of Lung=7/2013;  4. Lt. Lower Leg=7/2013.     Pt. had 1st Blood Clot after Hvsmmnsfzbbo=0693, & Last=2013. Estelita Filter= Rt.Lateral Neck.    HTN (hypertension) 06/06/2013    Pt states that she does not have hypertension    Hypercholesteremia     Irregular heartbeat     Neuromuscular disorder     neuropathy feet    Obese     PE (pulmonary embolism) 07/2013    bilat LE DVT.     Restless leg syndrome      Past Surgical History:   Procedure Laterality Date    ABDOMINAL SURGERY  2010    gastric sleeve    BILATERAL OOPHORECTOMY Bilateral 1/12/2015    CHOLECYSTECTOMY      Green' s filter Right 7/4/2012    Right Neck &  "Tunneled Down.    HERNIA REPAIR      "Dexter of Hernias Repaires around th Belly Button.", pt. states    LAPAROSCOPIC CHOLECYSTECTOMY N/A 9/10/2020    Procedure: CHOLECYSTECTOMY, LAPAROSCOPIC;  Surgeon: Montrell Gutierrez MD;  Location: [facility];  Service: General;  Laterality: N/A;  RN PREOP ----COVID Negative      OVARIAN CYST REMOVAL  3/13/2014    SC REMOVAL OF OVARY/TUBE(S)      SPLENECTOMY, TOTAL  2003    TONSILLECTOMY      as a child    TYMPANOSTOMY TUBE PLACEMENT      VEIN SURGERY      Lt leg       Family History     Problem Relation (Age of Onset)    Cataracts Father    Diabetes Father, Paternal Grandfather    Heart disease Father, Paternal Grandfather    Hypertension Father    No Known Problems Mother, Sister, Brother, Maternal Aunt, Maternal Uncle, Paternal Aunt, Paternal Uncle, Maternal Grandfather    Ovarian cancer Maternal Grandmother, Paternal Grandmother        Tobacco Use    Smoking status: Current Every Day Smoker     Packs/day: 1.00     Years: 37.00     Pack years: 37.00     Types: Cigarettes     Last attempt to quit: 2020     Years since quittin.4    Smokeless tobacco: Never Used    Tobacco comment: Enrolled in the [employer] on 5/3/14 ([facility] Member ID # [id number]). Ambulatory referral to Smoking Cessation Program   Substance and Sexual Activity    Alcohol use: No     Alcohol/week: 0.0 standard drinks    Drug use: No    Sexual activity: Yes     Partners: Male     Review of Systems   Constitutional: Negative for activity change, appetite change, chills, fatigue and fever.   Respiratory: Negative for cough and shortness of breath.    Cardiovascular: Positive for leg swelling. Negative for chest pain.   Gastrointestinal: Negative for diarrhea, nausea and vomiting.   Musculoskeletal: Positive for arthralgias and myalgias.   Skin: Positive for wound.   Neurological: Positive for numbness. Negative for weakness.        + paresthesia    Psychiatric/Behavioral: " The patient is nervous/anxious.      Objective:     Vital Signs (Most Recent):  Temp: 98.2 °F (36.8 °C) (05/10/22 0434)  Pulse: 79 (05/10/22 0434)  Resp: 20 (05/10/22 0434)  BP: (!) 165/78 (05/10/22 0434)  SpO2: 97 % (05/10/22 0434) Vital Signs (24h Range):  Temp:  [98.2 °F (36.8 °C)-100 °F (37.8 °C)] 98.2 °F (36.8 °C)  Pulse:  [68-85] 79  Resp:  [18-20] 20  SpO2:  [95 %-98 %] 97 %  BP: (132-175)/(59-78) 165/78     Weight: 126.4 kg (278 lb 10.6 oz)  Body mass index is 44.98 kg/m².    Foot Exam    General  Orientation: alert and oriented to person, place, and time       Right Foot/Ankle     Neurovascular  Dorsalis pedis: 1+  Posterior tibial: 1+  Saphenous nerve sensation: diminished  Tibial nerve sensation: diminished  Superficial peroneal nerve sensation: diminished  Deep peroneal nerve sensation: diminished  Sural nerve sensation: diminished      Left Foot/Ankle      Neurovascular  Dorsalis pedis: 1+  Posterior tibial: 1+  Saphenous nerve sensation: diminished  Tibial nerve sensation: diminished  Superficial peroneal nerve sensation: diminished  Deep peroneal nerve sensation: diminished  Sural nerve sensation: diminished          5/9/22:                05/08/2022 5/6/22:     Wound 1: Left plantar foot   Measurement: 8qrc5bmi2.3cm  pre debridement 5.5cmx5.5cmx0.4cm post debridement.  Base: fibrogranular base   Periwound skin: HPK, maceration   Drainage: serous   Erythema: mild  Probe: deep probe         Pre debridement       Post debridement             5/4/22:    Right foot- Epic unable to load image  Right plantar hallux- probe to periosteum fibrotic base  Right plantar forefoot - wound with fibrotic base.   Left foot  Left plantar foot ulceration with necrotic base deep probe to bone        Laboratory:  CBC:   Recent Labs   Lab 05/10/22  0458   WBC 15.49*   RBC 3.13*   HGB 8.4*   HCT 26.7*   *   MCV 85   MCH 26.8*   MCHC 31.5*     CMP:   Recent Labs   Lab 05/10/22  0458   *    CALCIUM 8.5*   ALBUMIN 2.3*   PROT 6.3   *   K 5.6*   CO2 23      BUN 11   CREATININE 0.8   ALKPHOS 96   ALT 11   AST 7*   BILITOT 0.2       Diagnostic Results:  Xray:  X-Ray Foot Complete Right  Order: [id number]   Status: Final result     Visible to patient: Yes (not seen)     Next appt: 05/09/2022 at 02:00 PM in Gastroenterology (Saloni De Anda MD)     Dx: Infection     0 Result Notes    Details    Reading Physician Reading Date Result Priority   Josr Almanzar MD  [phone] 5/4/2022 STAT     Narrative & Impression  EXAMINATION:  XR FOOT COMPLETE 3 VIEW RIGHT     CLINICAL HISTORY:  . Unspecified infectious disease     TECHNIQUE:  AP, lateral, and oblique views of the right foot were performed.     COMPARISON:  Right foot radiograph 04/20/2022.     FINDINGS:  No definite evidence of acute fracture or dislocation.  Lisfranc joint appears congruent.  There appears to be chronic appearing deformity at the 4th digit metatarsal head and neck with erosive change at the lateral aspect of the head.     Joint spaces appear to be maintained.  There is soft tissue swelling most pronounced at the dorsum of the mid and forefoot.  No definite radiopaque foreign body.  Enthesopathic change at the calcaneus.     Impression:     No definite evidence of acute fracture or dislocation.     MRI: MRI Foot (Forefoot) Right Without Contrast  Order: [id number]   Status: Final result     Visible to patient: Yes (not seen)     Next appt: 05/09/2022 at 02:00 PM in Gastroenterology (Saloni De Anda MD)     Dx: Other acute osteomyelitis, unspecifie...     0 Result Notes    Details    Reading Physician Reading Date Result Priority   Tu Santos Jr., MD  [phone] 5/6/2022 Routine     Narrative & Impression  EXAMINATION:  MRI FOOT (FOREFOOT) RIGHT WITHOUT CONTRAST     CLINICAL HISTORY:  Osteomyelitis, foot;eval OM, abscess right  plantar forefoot ulceration right hallux;  Other acute  osteomyelitis, unspecified ankle and foot     TECHNIQUE:  Noncontrast MRI of the right forefoot     COMPARISON:  X-ray 05/04/2022     FINDINGS:  There are hammertoe deformities of digits 2 through 4.  There is a fracture of the 4th metatarsal head without surrounding marrow edema but with visualization of the fracture line suggesting that it may be subacute to chronic.     There is soft tissue ulceration and skin thickening of the distal aspect of the great toe with subtle high T2 and low T1 signal involving the distal tuft compatible with cellulitis and osteomyelitis.  No focal soft tissue fluid collection.  Diffuse dorsal forefoot soft tissue edema is present and there are chronic denervation changes throughout the muscles.     Impression:     Cellulitis of the forefoot and great toe with osteomyelitis of the distal tuft of the 1st digit     Fracture of the 4th metatarsal neck, possibly subacute to chronic.           MRI: MRI Foot (Midfoot) Left Without Contrast  Order: [id number]   Status: Final result     Visible to patient: Yes (not seen)     Next appt: 05/09/2022 at 02:00 PM in Gastroenterology (Saloni De Anda MD)     Dx: Other acute osteomyelitis, unspecifie...     0 Result Notes    Details    Reading Physician Reading Date Result Priority   Tu Santos Jr., MD  [phone]  [phone] 5/6/2022 Routine     Narrative & Impression  EXAMINATION:  MRI FOOT (MIDFOOT) LEFT WITHOUT CONTRAST     CLINICAL HISTORY:  Osteomyelitis, foot;R/o abscess, OM left plantar foot ulceration;  Other acute osteomyelitis, unspecified ankle and foot     TECHNIQUE:  Multiplanar multisequence MRI of the left midfoot without intravenous contrast.     COMPARISON:  X-ray 04/20/2022     FINDINGS:  Diffuse advanced destructive arthropathic changes are seen throughout the midfoot with abnormal flatfoot deformity.  There is marked skin thickening and a large area of soft tissue ulceration at the plantar aspect of the midfoot without  localized fluid collection or evidence of a sinus tract extending to the bone.  Findings are associated with disruption the central aspect of the plantar aponeurosis and diffuse high signal within the plantar muscles of the midfoot.  No definite fatty atrophy.     Diffuse forefoot and midfoot soft tissue edema is     Impression:     Midfoot and forefoot cellulitis and plantar soft tissue phlegmon.     Destructive arthropathy of the midfoot most likely related to Charcot changes with superimposed septic arthritis difficult to exclude with certainty given the proximity to the foot ulcer and adjacent inflammatory change.     No drainable abscess.          Arterial US:  Contains abnormal data US Lower Extremity Arteries Bilateral  Order: [id number]   Status: Final result     Visible to patient: Yes (not seen)     Next appt: 05/09/2022 at 02:00 PM in Gastroenterology (Saloni De Anda MD)     Dx: Diabetic ulcer of other part of foot ...     0 Result Notes    Details    Reading Physician Reading Date Result Priority   Samuel Harris MD  [phone]  [phone] 5/6/2022 Routine     Narrative & Impression  EXAMINATION:  US LOWER EXTREMITY ARTERIES BILATERAL     CLINICAL HISTORY:  PAD eval;     TECHNIQUE:  Bilateral lower extremity arterial duplex ultrasound examination performed. Multiple gray scale and color doppler images were obtained in addition to waveform analysis.     COMPARISON:  Left lower extremity arterial Doppler 07/13/2021     FINDINGS:  Right lower extremity     Common femoral artery: 111-cm/sec; triphasic waveforms     Deep femoral artery, proximal: 70-cm/sec; triphasic waveforms     Superficial femoral artery, proximal: 100-cm/sec; triphasic waveforms     Superficial femoral artery, mid portion: 135-cm/sec; triphasic waveforms     Superficial femoral artery, distal: 137-cm/sec; triphasic waveforms     Popliteal artery, proximal: 83-cm/sec; triphasic waveforms     Popliteal artery, distal: 125-cm/sec;  triphasic waveforms     Anterior tibial artery: 121-cm/sec; triphasic waveforms     Posterior tibial artery: 48-cm/sec; triphasic waveforms     Peroneal artery: 48-cm/sec; triphasic waveforms     Dorsalis pedis artery: 133-cm/sec; triphasic waveforms     Left lower extremity     Common femoral artery: 120-cm/sec; triphasic waveforms     Deep femoral artery, proximal: 84-cm/sec; triphasic waveforms     Superficial femoral artery, proximal: 150-cm/sec; triphasic waveforms     Superficial femoral artery, mid portion: 189-cm/sec; triphasic waveforms     Superficial femoral artery, distal: 157-cm/sec; triphasic waveforms     Popliteal artery, proximal: 114-cm/sec; triphasic waveforms     Popliteal artery, distal: 139-cm/sec; triphasic waveforms     Anterior tibial artery: 58-cm/sec; triphasic waveforms     Posterior tibial artery: 61-cm/sec; triphasic waveforms     Peroneal artery: 48-cm/sec; triphasic waveforms     Dorsalis pedis artery: 177-cm/sec; triphasic waveforms     Impression:     1. Sonogram suggest hemodynamically significant stenosis in the left and DPA.  2. Multifocal mild to moderate grade stenoses is suspected throughout the left MIRACLE and, bilateral posterior tibial and peroneal arteries.  This report was flagged in Epic as abnormal.             Clinical Findings:  B/L ulceration probe to periosteum right hallux     Assessment/Plan:     Active Diagnoses:    Diagnosis Date Noted POA    PRINCIPAL PROBLEM:  Sepsis due to methicillin resistant Staphylococcus aureus (MRSA) [A41.02] 04/18/2022 Yes    Constipation [K59.00] 05/08/2022 Yes    Iron deficiency anemia [D50.9] 05/06/2022 Yes    Cellulitis of both feet [L03.115, L03.116] 05/06/2022 Yes    PAD (peripheral artery disease) [I73.9] 05/06/2022 Yes    Bacteremia due to Staphylococcus [R78.81, B95.8]  Yes    Osteomyelitis of right foot [M86.9]  Yes    Hyponatremia [E87.1] 05/04/2022 Yes    Hyperkalemia [E87.5] 04/21/2022 Yes    Chronic deep vein  thrombosis (DVT) of both lower extremities [I82.503] 04/13/2022 Yes    Diabetic foot ulcer associated with type 2 diabetes mellitus [E11.621, L97.509] 04/13/2022 Yes    Long term (current) use of anticoagulants [Z79.01] 09/03/2019 Not Applicable    Severe obesity (BMI >= 40) [E66.01] 08/10/2016 Yes    Thrombocytosis [D75.839] 07/16/2016 Yes    Bipolar 1 disorder [F31.9] 04/13/2015 Yes     Chronic    Diabetic neuropathy [E11.40] 11/28/2014 Yes    COPD (chronic obstructive pulmonary disease) [J44.9] 10/11/2013 Yes     Chronic    Essential hypertension [I10] 06/06/2013 Yes     Chronic      Problems Resolved During this Admission:    Diagnosis Date Noted Date Resolved POA    Chest pain [R07.9]  05/06/2022 Yes    Infection [B99.9]  05/06/2022 Yes    Wound infection [T14.8XXA, L08.9]  05/06/2022 Yes    Anemia [D64.9] 04/13/2022 05/06/2022 Yes    Cellulitis of lower extremity [L03.119] 02/10/2021 05/06/2022 Yes    Hypercoagulable state [D68.59] 09/25/2019 05/06/2022 Yes    Leukocytosis [D72.829] 07/16/2016 05/06/2022 Yes       Discussed two options with patient. Amputation of  Right hallux, L BKA   vs IV abx for six weeks with aggressive wound care for several months with no guarantee of wound resolution.  Patient declines amputation of right hallux, also declines L BKA. Elects for IV abx.     Abx per ID    Soft tissue swabs with polymicrobial infection    Vascular surgery communicated it is ok to proceed with debridement    Patient declines amputation    The risks, benefits, complications, treatment options, and expected outcomes were discussed with the patient. The risks and potential complications of their problem and purposed treatment include but are not limited to infection, nerve injury, vascular injury, persistent pain, potential skin necrosis, deep vein thrombosis, possible pulmonary embolus,and complications of the anesthetics.  The patient concurred with the proposed plan, giving informed consent.   The site of surgery properly noted/marked.    Plan for surgical debridement today    NPO since midnight.     Podiatry will continue to follow.    Maira De Los Santos DPM  Podiatry  [facility] - Telemetry

## 2022-05-10 NOTE — PROGRESS NOTES
[facility]  Vascular Surgery  Progress Note    Patient Name: Audrey Natarajan  MRN: [id number]  Admission Date: 5/4/2022  Primary Care Provider: Donaldo Pena MD    Subjective:     Interval History: No new issues    Post-Op Info:  Procedure(s) (LRB):  DEBRIDEMENT, FOOT (Bilateral)   Day of Surgery     Facility-Administered Medications Prior to Admission   Medication Dose Route Frequency Provider Last Rate Last Admin    LIDOcaine HCL 10 mg/ml (1%) injection 1 mL  1 mL Other 1 time in Clinic/HOD Tone Mata MD        LIDOcaine-EPINEPHrine 1%-1:100,000 30 mL, LIDOcaine HCL 10 mg/ml (1%) 20 mL, sodium bicarbonate 10 mL in sodium chloride 0.9% 500 mL solution   MISCELLANEOUS 1 time in Clinic/HOD Tone Mata MD         Medications Prior to Admission   Medication Sig Dispense Refill Last Dose    acetaminophen (TYLENOL) 500 MG tablet Take 2 tablets (1,000 mg total) by mouth every 6 (six) hours as needed for Pain. 30 tablet 0     albuterol (PROVENTIL/VENTOLIN HFA) 90 mcg/actuation inhaler Inhale 2 puffs into the lungs every 6 (six) hours as needed for Wheezing. Use with spacer  Dispense with 1 spacer 18 g 0 5/4/2022 at Unknown time    albuterol-ipratropium (DUO-NEB) 2.5 mg-0.5 mg/3 mL nebulizer solution Take 3 mLs by nebulization every 6 (six) hours as needed for Wheezing or Shortness of Breath. Rescue 1 Box 0 5/4/2022 at Unknown time    apixaban (ELIQUIS) 5 mg Tab Take 1 tablet (5 mg total) by mouth 2 (two) times daily. 30 tablet 3 5/4/2022 at Unknown time    aspirin 81 MG Chew Take 1 tablet (81 mg total) by mouth once daily. 30 tablet 11 5/4/2022 at Unknown time    dicyclomine (BENTYL) 20 mg tablet Take 1 tablet (20 mg total) by mouth every 6 (six) hours. 30 tablet 0 5/4/2022 at Unknown time    divalproex (DEPAKOTE) 250 MG EC tablet Take 5 tablets (1,250 mg total) by mouth every evening. 150 tablet 11 5/3/2022 at Unknown time    divalproex (DEPAKOTE) 500 MG TbEC Take 1 tablet (500  mg total) by mouth once daily. PO QAM 30 tablet 11 2022 at Unknown time    [] doxycycline (VIBRAMYCIN) 100 MG Cap Take 1 capsule (100 mg total) by mouth every 12 (twelve) hours. for 8 days 16 capsule 0 2022 at Unknown time    DUPIXENT  mg/2 mL PnIj SMARTSI Milligram(s) SUB-Q Every 2 Weeks       EPITOL 200 mg tablet Take 200 mg by mouth 2 (two) times a day.   2022 at Unknown time    famotidine (PEPCID) 20 MG tablet Take 20 mg by mouth 2 (two) times daily.   2022 at Unknown time    fluticasone propionate (FLONASE) 50 mcg/actuation nasal spray 1 spray (50 mcg total) by Each Nostril route 2 (two) times daily. 16 g 0 2022 at Unknown time    furosemide (LASIX) 20 MG tablet TAKE 1 TABLET(20 MG) BY MOUTH EVERY DAY 90 tablet 1 2022 at Unknown time    gabapentin (NEURONTIN) 300 MG capsule TAKE 2 CAPSULES(600 MG) BY MOUTH TWICE DAILY 120 capsule 2 2022 at Unknown time    hydrOXYzine (ATARAX) 50 MG tablet Take 50 mg by mouth 4 (four) times daily as needed.   2022 at Unknown time    ibuprofen (ADVIL,MOTRIN) 600 MG tablet TAKE 1 TABLET(600 MG) BY MOUTH EVERY 8 HOURS AS NEEDED FOR PAIN OR BACK PAIN 90 tablet 0     lisinopriL 10 MG tablet Take 1 tablet (10 mg total) by mouth once daily. 90 tablet 3 2022 at Unknown time    loratadine (CLARITIN) 10 mg tablet Take 1 tablet (10 mg total) by mouth once daily. 60 tablet 0     magnesium oxide (MAG-OX) 400 mg (241.3 mg magnesium) tablet Take 1 tablet (400 mg total) by mouth 2 (two) times daily. 30 tablet 3 5/3/2022 at Unknown time    metFORMIN (GLUCOPHAGE) 1000 MG tablet TAKE 1 TABLET(1000 MG) BY MOUTH TWICE DAILY WITH MEALS 180 tablet 2 2022 at Unknown time    metoprolol tartrate (LOPRESSOR) 50 MG tablet TAKE 1 TABLET(50 MG) BY MOUTH TWICE DAILY 180 tablet 2 2022 at Unknown time    [] metroNIDAZOLE (FLAGYL) 500 MG tablet Take 1 tablet (500 mg total) by mouth every 8 (eight) hours. for 8 days 24 tablet 0  5/4/2022 at Unknown time    OLANZapine (ZYPREXA) 10 MG tablet Take 1 tablet (10 mg total) by mouth every 8 (eight) hours as needed (Agitation). 30 tablet 11 5/4/2022 at Unknown time    OPTICHAMBER BIGG LG MASK Spcr Inhale into the lungs.   5/4/2022 at Unknown time    pantoprazole (PROTONIX) 40 MG tablet Take 1 tablet (40 mg total) by mouth once daily. 30 tablet 0 5/4/2022 at Unknown time    polyvinyl alcohol, artificial tears, (LIQUIFILM TEARS) 1.4 % ophthalmic solution Place 1 drop into both eyes 4 (four) times daily. 15 mL 2 5/4/2022 at Unknown time    pravastatin (PRAVACHOL) 40 MG tablet TAKE 1 TABLET(40 MG) BY MOUTH EVERY EVENING 90 tablet 3 5/3/2022 at Unknown time    risperiDONE (RISPERDAL M-TABS) 3 MG disintegrating tablet Take 1 tablet (3 mg total) by mouth 2 (two) times daily. 60 tablet 11 5/4/2022 at Unknown time    senna (SENOKOT) 8.6 mg tablet Take 1 tablet by mouth 2 (two) times a day. 60 tablet 2 5/4/2022 at Unknown time    blood sugar diagnostic Strp To check BG two  times daily, to use with insurance preferred meter (Patient taking differently: To check BG two  times daily, to use with insurance preferred meter) 100 each 1     blood-glucose meter kit To check BG once daily, to use with insurance preferred meter 1 each 0     blood-glucose meter kit To check BG two times daily, to use with insurance preferred meter 1 each 0     fluticasone-salmeterol diskus inhaler 250-50 mcg Inhale 1 puff into the lungs 2 (two) times daily. Controller 60 each 2 Unknown at Unknown time    hydrOXYzine pamoate (VISTARIL) 50 MG Cap Take 1 capsule (50 mg total) by mouth 3 (three) times daily. 90 capsule 2     lancets Misc To check BG two times daily, to use with insurance preferred meter 100 each 1     multivitamin Tab Take 1 tablet by mouth once daily. 30 tablet 2 Unknown at Unknown time    TRUE METRIX GLUCOSE METER Misc CHECK BLOOD SUGAR TWICE DAILY 1 each 0        Review of patient's allergies  "indicates:   Allergen Reactions    Morphine Other (See Comments)     Patient had a psychotic episode after taking Morphine  Agitation, hallucinations    Penicillins Anaphylaxis     itching    Januvia [sitagliptin] Hives    Carbamazepine Other (See Comments)     hyponatremia       Past Medical History:   Diagnosis Date    ADHD (attention deficit hyperactivity disorder)     Arthritis     Asthma     Bipolar 1 disorder     Cataract     Cigarette smoker     COPD (chronic obstructive pulmonary disease)     Coronary artery disease     A fib    Depression     bipolar manic depresson    Diabetes mellitus     Diabetic foot ulcers     Diabetic neuropathy     DVT of lower extremity, bilateral 07/2013    bilateral LE DVT. [place] filter placed.     Encounter for blood transfusion     History of blood clots 1. Left Leg=2003; 2.Bilateral Groin=Blood Clots= 5 or 6/ 2013 & 7/2013; 3. LLL of Lung=7/2013;  4. Lt. Lower Leg=7/2013.     Pt. had 1st Blood Clot after Glegcaukebep=2139, & Last=2013. [place] Filter= Rt.Lateral Neck.    HTN (hypertension) 06/06/2013    Pt states that she does not have hypertension    Hypercholesteremia     Irregular heartbeat     Neuromuscular disorder     neuropathy feet    Obese     PE (pulmonary embolism) 07/2013    bilat LE DVT.     Restless leg syndrome      Past Surgical History:   Procedure Laterality Date    ABDOMINAL SURGERY  2010    gastric sleeve    BILATERAL OOPHORECTOMY Bilateral 1/12/2015    CHOLECYSTECTOMY      Green' s filter Right 7/4/2012    Right Neck & Tunneled Down.    HERNIA REPAIR      "[place] Hern[place] Repaires around th [place].", pt. states    LAPAROSCOPIC CHOLECYSTECTOMY N/A 9/10/2020    Procedure: CHOLECYSTECTOMY, LAPAROSCOPIC;  Surgeon: Montrell Gutierrez MD;  [facility];  Service: General;  Laterality: N/A;  RN PREOP 9/9----COVID Negative  9/9    OVARIAN CYST REMOVAL  3/13/2014    SD REMOVAL OF OVARY/TUBE(S)      SPLENECTOMY, " TOTAL  2003    TONSILLECTOMY      as a child    TYMPANOSTOMY TUBE PLACEMENT  1976    VEIN SURGERY      Lt leg     Family History       Problem Relation (Age of Onset)    Cataracts Father    Diabetes Father, Paternal Grandfather    Heart disease Father, Paternal Grandfather    Hypertension Father    No Known Problems Mother, Sister, Brother, Maternal Aunt, Maternal Uncle, Paternal Aunt, Paternal Uncle, Maternal Grandfather    Ovarian cancer Maternal Grandmother, Paternal Grandmother          Tobacco Use    Smoking status: Current Every Day Smoker     Packs/day: 1.00     Years: 37.00     Pack years: 37.00     Types: Cigarettes     Last attempt to quit: 2020     Years since quittin.4    Smokeless tobacco: Never Used    Tobacco comment: Enrolled in the [employer] on 5/3/14 ([employer] Member ID # [id number]). Ambulatory referral to Smoking Cessation Program   Substance and Sexual Activity    Alcohol use: No     Alcohol/week: 0.0 standard drinks    Drug use: No    Sexual activity: Yes     Partners: Male     Review of Systems   Constitutional:  Negative for chills.   HENT:  Negative for congestion.    Eyes:  Negative for visual disturbance.   Respiratory:  Negative for shortness of breath.    Cardiovascular:  Negative for chest pain.   Gastrointestinal:  Negative for abdominal distention.   Endocrine: Negative for cold intolerance.   Genitourinary:  Negative for flank pain.   Musculoskeletal:  Negative for back pain.   Skin:  Negative for pallor and rash.   Allergic/Immunologic: Negative for immunocompromised state.   Neurological:  Negative for dizziness.   Hematological:  Does not bruise/bleed easily.   Psychiatric/Behavioral:  Negative for agitation.    Objective:     Vital Signs (Most Recent):  Temp: 98.5 °F (36.9 °C) (05/10/22 1641)  Pulse: 81 (05/10/22 1641)  Resp: 18 (05/10/22 1641)  BP: (!) 144/66 (05/10/22 1641)  SpO2: (!) 94 % (05/10/22 1641)   Vital Signs (24h Range):  Temp:  [98 °F  (36.7 °C)-100 °F (37.8 °C)] 98.5 °F (36.9 °C)  Pulse:  [68-95] 81  Resp:  [17-20] 18  SpO2:  [93 %-98 %] 94 %  BP: (132-175)/(59-78) 144/66     Weight: 126.4 kg (278 lb 10.6 oz)  Body mass index is 44.98 kg/m².    Physical Exam  Vitals reviewed.   Constitutional:       General: She is not in acute distress.     Appearance: She is well-developed. She is not diaphoretic.   HENT:      Head: Normocephalic and atraumatic.   Eyes:      Conjunctiva/sclera: Conjunctivae normal.   Cardiovascular:      Rate and Rhythm: Normal rate.      Pulses:           Femoral pulses are 2+ on the right side and 2+ on the left side.       Dorsalis pedis pulses are detected w/ Doppler on the right side and detected w/ Doppler on the left side.        Posterior tibial pulses are detected w/ Doppler on the right side and detected w/ Doppler on the left side.   Pulmonary:      Effort: Pulmonary effort is normal.   Abdominal:      General: There is no distension.      Palpations: Abdomen is soft. There is no mass.      Tenderness: There is no abdominal tenderness. There is no guarding or rebound.      Hernia: No hernia is present.   Musculoskeletal:         General: No deformity. Normal range of motion.      Cervical back: Neck supple.   Feet:      Right foot:      Skin integrity: Ulcer present.      Left foot:      Skin integrity: Ulcer present.   Skin:     Findings: No rash.   Neurological:      Mental Status: She is alert and oriented to person, place, and time.       Significant Labs:  All pertinent labs from the last 24 hours have been reviewed.    Significant Diagnostics:  I have reviewed all pertinent imaging results/findings within the past 24 hours.    Assessment/Plan:     Diabetic foot ulcer associated with type 2 diabetes mellitus  -Imaging reviewed.  Pt appears to have adequate perfusion to heal wound.  Will continue to follow closely and perform angiography/revascularization if wound does not heal well.          Tone Mata,  MD  Vascular Surgery  [facility] [place]

## 2022-05-10 NOTE — ASSESSMENT & PLAN NOTE
"This patient does have evidence of infective focus- bilateral DM foot wounds and bacteremia  My overall impression is sepsis. Vital signs were reviewed and noted in progress note.  Antibiotics given-   Antibiotics (From admission, onward)            Start     Stop Route Frequency Ordered    05/09/22 1030  vancomycin 1.5 g in dextrose 5 % 250 mL IVPB (ready to mix)         -- IV Every 12 hours (non-standard times) 05/09/22 0345    05/04/22 1245  mupirocin 2 % ointment         05/09 0859 Nasl 2 times daily 05/04/22 1135    05/04/22 1103  vancomycin - pharmacy to dose  (vancomycin IVPB)        "And" Linked Group Details    -- IV pharmacy to manage frequency 05/04/22 1004        Cultures were taken-   Microbiology Results (last 7 days)     Procedure Component Value Units Date/Time    Blood culture [[id number]] Collected: 05/06/22 1155    Order Status: Completed Specimen: Blood from Antecubital, Right Hand Updated: 05/10/22 1303     Blood Culture, Routine No Growth after 4 days.     Blood culture [[id number]] Collected: 05/06/22 1155    Order Status: Completed Specimen: Blood from Peripheral, Left Hand Updated: 05/10/22 1303     Blood Culture, Routine No Growth after 4 days.     Aerobic culture [[id number]] Collected: 05/06/22 1249    Order Status: Completed Specimen: Bone from Toe, Right Foot Updated: 05/10/22 0734     Aerobic Bacterial Culture No growth    AFB Culture & Smear [[id number]] Collected: 05/06/22 1249    Order Status: Completed Specimen: Bone from Toe, Right Foot Updated: 05/09/22 1429     AFB Culture & Smear Culture in progress     AFB CULTURE STAIN No acid fast bacilli seen.    Blood culture x two cultures. Draw prior to antibiotics. [[id number]] Collected: 05/04/22 0947    Order Status: Completed Specimen: Blood from Peripheral, Antecubital, Left Updated: 05/08/22 1103     Blood Culture, Routine No Growth after 4 days.     Narrative:      Aerobic and anaerobic    Blood culture x two cultures. Draw prior to " antibiotics. [[id number]]  (Abnormal)  (Susceptibility) Collected: 05/04/22 0944    Order Status: Completed Specimen: Blood from Peripheral, Antecubital, Right Updated: 05/07/22 0809     Blood Culture, Routine Gram stain abbe bottle: Gram positive cocci in clusters resembling Staph       Results called to and read back by: Linnette KRAMER 05/05/2022        09:58      METHICILLIN RESISTANT STAPHYLOCOCCUS AUREUS    Narrative:      Aerobic and anaerobic    Gram stain [[id number]] Collected: 05/06/22 1249    Order Status: Completed Specimen: Bone from Toe, Right Foot Updated: 05/06/22 1446     Gram Stain Result No organisms seen    Fungus culture [[id number]] Collected: 05/06/22 1249    Order Status: Sent Specimen: Bone from Toe, Right Foot Updated: 05/06/22 1302    Aerobic culture [[id number]]  (Abnormal)  (Susceptibility) Collected: 05/04/22 1500    Order Status: Completed Specimen: Wound from Foot, Right Updated: 05/06/22 1126     Aerobic Bacterial Culture Results called to and read back by: Linnette Alvarado 05/06/2022  08:40      ESCHERICHIA COLI  Many        ENTEROBACTER CLOACAE  Moderate        METHICILLIN RESISTANT STAPHYLOCOCCUS AUREUS  Many      Aerobic culture [[id number]]  (Abnormal)  (Susceptibility) Collected: 05/04/22 1500    Order Status: Completed Specimen: Wound from Foot, Left Updated: 05/06/22 0844     Aerobic Bacterial Culture Results called to and read back by: Linnette Alvarado 05/06/2022  08:40      METHICILLIN RESISTANT STAPHYLOCOCCUS AUREUS  Many          Latest lactate reviewed, they are-  No results for input(s): LACTATE in the last 72 hours.  Staph bacteremia- repeat blood cultures. TTE no vegetation.    MRI L foot: mid/forefoot cellulitis, Charcot changes cannot rule out septic arthritis  MRI R foot: cellulitis forefoot and great toe. Osteomyelitis of 1st digit    Podiatry following. Declines amputation. Plan for further operative debridement- anticipate sometime this week.   Vascular  consulted for abnormal arterial US- recs pending   ID consulted- anticipate prolonged IV antibiotics, ok to d/c cipro and flagyl

## 2022-05-10 NOTE — ASSESSMENT & PLAN NOTE
Long history of bipolar 1 disorder with recent psychosis at last hospital stay.  Carbamazepine has caused hyponatremia.  - sister Crystal states patient has not been compliant with her medications and feels that she still has some paranoia that her stepdaughter had been hiding her medications though she believes it was the patient herself.  - Psych consulted- OK to continue current regimen of risperidone and depakote minus carbamazepine

## 2022-05-11 LAB
ALDOST SERPL-MCNC: 5.8 NG/DL
ANION GAP SERPL CALC-SCNC: 8 MMOL/L (ref 8–16)
BASOPHILS # BLD AUTO: 0.08 K/UL (ref 0–0.2)
BASOPHILS NFR BLD: 0.5 % (ref 0–1.9)
BUN SERPL-MCNC: 10 MG/DL (ref 6–20)
CALCIUM SERPL-MCNC: 8.5 MG/DL (ref 8.7–10.5)
CHLORIDE SERPL-SCNC: 103 MMOL/L (ref 95–110)
CO2 SERPL-SCNC: 24 MMOL/L (ref 23–29)
CREAT SERPL-MCNC: 0.7 MG/DL (ref 0.5–1.4)
DIFFERENTIAL METHOD: ABNORMAL
EOSINOPHIL # BLD AUTO: 0.3 K/UL (ref 0–0.5)
EOSINOPHIL NFR BLD: 2 % (ref 0–8)
ERYTHROCYTE [DISTWIDTH] IN BLOOD BY AUTOMATED COUNT: 17.8 % (ref 11.5–14.5)
EST. GFR  (AFRICAN AMERICAN): >60 ML/MIN/1.73 M^2
EST. GFR  (NON AFRICAN AMERICAN): >60 ML/MIN/1.73 M^2
GLUCOSE SERPL-MCNC: 170 MG/DL (ref 70–110)
HCT VFR BLD AUTO: 23.4 % (ref 37–48.5)
HGB BLD-MCNC: 7.4 G/DL (ref 12–16)
IMM GRANULOCYTES # BLD AUTO: 0.42 K/UL (ref 0–0.04)
IMM GRANULOCYTES NFR BLD AUTO: 2.7 % (ref 0–0.5)
LYMPHOCYTES # BLD AUTO: 3.5 K/UL (ref 1–4.8)
LYMPHOCYTES NFR BLD: 22.1 % (ref 18–48)
MAGNESIUM SERPL-MCNC: 1.6 MG/DL (ref 1.6–2.6)
MCH RBC QN AUTO: 27.2 PG (ref 27–31)
MCHC RBC AUTO-ENTMCNC: 31.6 G/DL (ref 32–36)
MCV RBC AUTO: 86 FL (ref 82–98)
MONOCYTES # BLD AUTO: 1.8 K/UL (ref 0.3–1)
MONOCYTES NFR BLD: 11.5 % (ref 4–15)
NEUTROPHILS # BLD AUTO: 9.6 K/UL (ref 1.8–7.7)
NEUTROPHILS NFR BLD: 61.2 % (ref 38–73)
NRBC BLD-RTO: 0 /100 WBC
PLATELET # BLD AUTO: 680 K/UL (ref 150–450)
PMV BLD AUTO: 9.2 FL (ref 9.2–12.9)
POCT GLUCOSE: 117 MG/DL (ref 70–110)
POCT GLUCOSE: 162 MG/DL (ref 70–110)
POCT GLUCOSE: 177 MG/DL (ref 70–110)
POCT GLUCOSE: 185 MG/DL (ref 70–110)
POCT GLUCOSE: 200 MG/DL (ref 70–110)
POTASSIUM SERPL-SCNC: 5.4 MMOL/L (ref 3.5–5.1)
RBC # BLD AUTO: 2.72 M/UL (ref 4–5.4)
SODIUM SERPL-SCNC: 135 MMOL/L (ref 136–145)
WBC # BLD AUTO: 15.68 K/UL (ref 3.9–12.7)

## 2022-05-11 PROCEDURE — 99232 SBSQ HOSP IP/OBS MODERATE 35: CPT | Mod: ,,, | Performed by: PODIATRIST

## 2022-05-11 PROCEDURE — 80048 BASIC METABOLIC PNL TOTAL CA: CPT | Performed by: PODIATRIST

## 2022-05-11 PROCEDURE — 21400001 HC TELEMETRY ROOM

## 2022-05-11 PROCEDURE — 25000242 PHARM REV CODE 250 ALT 637 W/ HCPCS: Performed by: STUDENT IN AN ORGANIZED HEALTH CARE EDUCATION/TRAINING PROGRAM

## 2022-05-11 PROCEDURE — 85025 COMPLETE CBC W/AUTO DIFF WBC: CPT | Performed by: PODIATRIST

## 2022-05-11 PROCEDURE — 25000003 PHARM REV CODE 250: Performed by: PODIATRIST

## 2022-05-11 PROCEDURE — 99232 PR SUBSEQUENT HOSPITAL CARE,LEVL II: ICD-10-PCS | Mod: ,,, | Performed by: INTERNAL MEDICINE

## 2022-05-11 PROCEDURE — 25000003 PHARM REV CODE 250: Performed by: STUDENT IN AN ORGANIZED HEALTH CARE EDUCATION/TRAINING PROGRAM

## 2022-05-11 PROCEDURE — 94640 AIRWAY INHALATION TREATMENT: CPT

## 2022-05-11 PROCEDURE — 36415 COLL VENOUS BLD VENIPUNCTURE: CPT | Performed by: PODIATRIST

## 2022-05-11 PROCEDURE — 27000207 HC ISOLATION

## 2022-05-11 PROCEDURE — 99232 PR SUBSEQUENT HOSPITAL CARE,LEVL II: ICD-10-PCS | Mod: ,,, | Performed by: PODIATRIST

## 2022-05-11 PROCEDURE — 63600175 PHARM REV CODE 636 W HCPCS: Performed by: PODIATRIST

## 2022-05-11 PROCEDURE — 99232 SBSQ HOSP IP/OBS MODERATE 35: CPT | Mod: ,,, | Performed by: INTERNAL MEDICINE

## 2022-05-11 PROCEDURE — 83735 ASSAY OF MAGNESIUM: CPT | Performed by: PODIATRIST

## 2022-05-11 PROCEDURE — 25000003 PHARM REV CODE 250: Performed by: HOSPITALIST

## 2022-05-11 RX ADMIN — FLUTICASONE FUROATE AND VILANTEROL TRIFENATATE 1 PUFF: 100; 25 POWDER RESPIRATORY (INHALATION) at 08:05

## 2022-05-11 RX ADMIN — HYDROXYZINE PAMOATE 50 MG: 25 CAPSULE ORAL at 09:05

## 2022-05-11 RX ADMIN — VANCOMYCIN HYDROCHLORIDE 1500 MG: 1.5 INJECTION, POWDER, LYOPHILIZED, FOR SOLUTION INTRAVENOUS at 10:05

## 2022-05-11 RX ADMIN — METOPROLOL TARTRATE 50 MG: 50 TABLET, FILM COATED ORAL at 08:05

## 2022-05-11 RX ADMIN — PANTOPRAZOLE SODIUM 40 MG: 40 TABLET, DELAYED RELEASE ORAL at 09:05

## 2022-05-11 RX ADMIN — PRAVASTATIN SODIUM 40 MG: 40 TABLET ORAL at 08:05

## 2022-05-11 RX ADMIN — OXYCODONE AND ACETAMINOPHEN 1 TABLET: 7.5; 325 TABLET ORAL at 07:05

## 2022-05-11 RX ADMIN — APIXABAN 5 MG: 5 TABLET, FILM COATED ORAL at 08:05

## 2022-05-11 RX ADMIN — FLUDROCORTISONE ACETATE 100 MCG: 0.1 TABLET ORAL at 09:05

## 2022-05-11 RX ADMIN — METOPROLOL TARTRATE 50 MG: 50 TABLET, FILM COATED ORAL at 09:05

## 2022-05-11 RX ADMIN — CETIRIZINE HYDROCHLORIDE 5 MG: 5 TABLET ORAL at 09:05

## 2022-05-11 RX ADMIN — OXYCODONE AND ACETAMINOPHEN 1 TABLET: 7.5; 325 TABLET ORAL at 03:05

## 2022-05-11 RX ADMIN — HYDROXYZINE PAMOATE 50 MG: 25 CAPSULE ORAL at 08:05

## 2022-05-11 RX ADMIN — RISPERIDONE 2 MG: 1 TABLET ORAL at 09:05

## 2022-05-11 RX ADMIN — DIVALPROEX SODIUM 500 MG: 250 TABLET, DELAYED RELEASE ORAL at 09:05

## 2022-05-11 RX ADMIN — DIVALPROEX SODIUM 1250 MG: 250 TABLET, DELAYED RELEASE ORAL at 08:05

## 2022-05-11 RX ADMIN — HYDROXYZINE PAMOATE 50 MG: 25 CAPSULE ORAL at 03:05

## 2022-05-11 RX ADMIN — OXYCODONE AND ACETAMINOPHEN 1 TABLET: 7.5; 325 TABLET ORAL at 10:05

## 2022-05-11 RX ADMIN — ASPIRIN 81 MG CHEWABLE TABLET 81 MG: 81 TABLET CHEWABLE at 09:05

## 2022-05-11 RX ADMIN — RISPERIDONE 3 MG: 1 TABLET ORAL at 08:05

## 2022-05-11 RX ADMIN — APIXABAN 5 MG: 5 TABLET, FILM COATED ORAL at 09:05

## 2022-05-11 NOTE — SUBJECTIVE & OBJECTIVE
Interval History: doing well today, doesn't have much pain due to decreased sensation. Using the bathroom okay. No complaints.    Review of Systems   Constitutional:  Negative for chills and fever.   Respiratory:  Negative for shortness of breath.    Cardiovascular:  Positive for leg swelling. Negative for chest pain.   Gastrointestinal:  Negative for abdominal pain, constipation, diarrhea, nausea and vomiting.   Genitourinary:  Negative for difficulty urinating.   Musculoskeletal:  Negative for arthralgias, back pain and myalgias.   Skin:  Positive for wound.   Neurological:  Positive for weakness and numbness.   Psychiatric/Behavioral:  Negative for confusion.    Objective:     Vital Signs (Most Recent):  Temp: 99.1 °F (37.3 °C) (05/11/22 1229)  Pulse: 62 (05/11/22 1229)  Resp: 18 (05/11/22 1503)  BP: (!) 159/71 (05/11/22 1229)  SpO2: (!) 94 % (05/11/22 1229) Vital Signs (24h Range):  Temp:  [98.5 °F (36.9 °C)-99.1 °F (37.3 °C)] 99.1 °F (37.3 °C)  Pulse:  [62-82] 62  Resp:  [17-20] 18  SpO2:  [93 %-98 %] 94 %  BP: (135-178)/(61-77) 159/71     Weight: 126.4 kg (278 lb 10.6 oz)  Body mass index is 44.98 kg/m².    Intake/Output Summary (Last 24 hours) at 5/11/2022 1602  Last data filed at 5/11/2022 1229  Gross per 24 hour   Intake 2830.74 ml   Output --   Net 2830.74 ml        Physical Exam  Vitals and nursing note reviewed.   Constitutional:       General: She is not in acute distress.     Appearance: She is obese. She is not ill-appearing or toxic-appearing.   HENT:      Head: Normocephalic and atraumatic.      Nose: Nose normal.      Mouth/Throat:      Mouth: Mucous membranes are moist.   Cardiovascular:      Rate and Rhythm: Normal rate and regular rhythm.      Heart sounds: Normal heart sounds. No murmur heard.    No gallop.   Pulmonary:      Effort: Pulmonary effort is normal. No respiratory distress.      Breath sounds: Normal breath sounds. No wheezing or rales.      Comments: Room air  Abdominal:       General: Bowel sounds are normal. There is no distension.      Palpations: Abdomen is soft.      Tenderness: There is no abdominal tenderness. There is no guarding.   Musculoskeletal:      Right lower leg: Edema present.      Left lower leg: Edema present.   Skin:     General: Skin is warm and dry.      Comments: See images from Dr. Gill's note, evaluated with dressing changes   Neurological:      Mental Status: She is alert and oriented to person, place, and time.       Significant Labs: All pertinent labs within the past 24 hours have been reviewed.    Significant Imaging: I have reviewed all pertinent imaging results/findings within the past 24 hours.

## 2022-05-11 NOTE — PROGRESS NOTES
Vancomycin consult follow-up:    Patient reviewed, renal function stable, no new levels, continue current therapy; Next levels due: trough due 5/12/2022 at 0930

## 2022-05-11 NOTE — PROGRESS NOTES
Audrey Natarajan is a 51 y.o. female patient.    Follow for electrolytes imbalance    No new c/o, reportedly feeling well  Comfortable    Scheduled Meds:   apixaban  5 mg Oral BID    aspirin  81 mg Oral Daily    cetirizine  5 mg Oral Daily    divalproex  1,250 mg Oral QHS    divalproex  500 mg Oral Daily    fludrocortisone  100 mcg Oral Daily    fluticasone furoate-vilanteroL  1 puff Inhalation Daily    fluticasone propionate  2 spray Each Nostril Daily    hydrOXYzine pamoate  50 mg Oral TID    metoprolol tartrate  50 mg Oral BID    pantoprazole  40 mg Oral Daily    polyethylene glycol  17 g Oral Daily    pravastatin  40 mg Oral QHS    risperiDONE  2 mg Oral Daily    risperiDONE  3 mg Oral QHS    senna-docusate 8.6-50 mg  2 tablet Oral BID    vancomycin (VANCOCIN) IVPB  1,500 mg Intravenous Q12H       Review of patient's allergies indicates:   Allergen Reactions    Morphine Other (See Comments)     Patient had a psychotic episode after taking Morphine  Agitation, hallucinations    Penicillins Anaphylaxis     itching    Januvia [sitagliptin] Hives    Carbamazepine Other (See Comments)     hyponatremia         Vital Signs Range (Last 24H):  Temp:  [98 °F (36.7 °C)-98.9 °F (37.2 °C)]   Pulse:  [68-95]   Resp:  [17-20]   BP: (135-178)/(61-77)   SpO2:  [93 %-98 %]     I & O (Last 24H):    Intake/Output Summary (Last 24 hours) at 5/11/2022 1038  Last data filed at 5/11/2022 0818  Gross per 24 hour   Intake 2990.74 ml   Output --   Net 2990.74 ml           Physical Exam:  General appearance: well developed, well nourished, no distress, morbidly obese  Lungs:  clear to auscultation bilaterally and normal respiratory effort  Heart: regular rate and rhythm  Abdomen: soft, non-tender non-distented; bowel sounds normal; no masses,  no organomegaly  Extremities: edema (+)    Laboratory:  I have reviewed all pertinent lab results within the past 24 hours.  CBC:   Recent Labs   Lab 05/11/22  7333    WBC 15.68*   RBC 2.72*   HGB 7.4*   HCT 23.4*   *   MCV 86   MCH 27.2   MCHC 31.6*     CMP:   Recent Labs   Lab 05/10/22  0458 05/11/22  0420   * 170*   CALCIUM 8.5* 8.5*   ALBUMIN 2.3*  --    PROT 6.3  --    * 135*   K 5.6* 5.4*   CO2 23 24    103   BUN 11 10   CREATININE 0.8 0.7   ALKPHOS 96  --    ALT 11  --    AST 7*  --    BILITOT 0.2  --        Imp/Plan    Hyponatremia - stable  Hyperkalemia - stable  DM type 2  Bipolar  Infected DM foot ulcers (B) s/p debridement  Anemia     Continue present Rx  Renal status stable  We'll follow prn  Thanks      Marjorie Johnson  5/11/2022

## 2022-05-11 NOTE — SUBJECTIVE & OBJECTIVE
Interval History: NAEO. Awiating placement. Has picc. Tolerating abx.     Review of Systems   Constitutional:  Negative for chills and fever.   Skin:  Positive for wound.   Psychiatric/Behavioral:  Positive for behavioral problems. The patient is nervous/anxious.    All other systems reviewed and are negative.  Objective:     Vital Signs (Most Recent):  Temp: 99.1 °F (37.3 °C) (05/11/22 1229)  Pulse: 62 (05/11/22 1229)  Resp: 20 (05/11/22 1229)  BP: (!) 159/71 (05/11/22 1229)  SpO2: (!) 94 % (05/11/22 1229)   Vital Signs (24h Range):  Temp:  [98 °F (36.7 °C)-99.1 °F (37.3 °C)] 99.1 °F (37.3 °C)  Pulse:  [62-95] 62  Resp:  [17-20] 20  SpO2:  [93 %-98 %] 94 %  BP: (135-178)/(61-77) 159/71     Weight: 126.4 kg (278 lb 10.6 oz)  Body mass index is 44.98 kg/m².    Estimated Creatinine Clearance: 132.1 mL/min (based on SCr of 0.7 mg/dL).    Physical Exam  Constitutional:       Appearance: Normal appearance.   Eyes:      Extraocular Movements: Extraocular movements intact.      Conjunctiva/sclera: Conjunctivae normal.      Pupils: Pupils are equal, round, and reactive to light.   Cardiovascular:      Rate and Rhythm: Normal rate and regular rhythm.   Pulmonary:      Effort: Pulmonary effort is normal.      Breath sounds: Normal breath sounds.   Abdominal:      General: Abdomen is flat. There is no distension.   Musculoskeletal:         General: Deformity and signs of injury present.      Comments: Bilateral feet dressed   Skin:     General: Skin is warm and dry.      Comments: Picc line present- dressings c/d/i   Neurological:      General: No focal deficit present.      Mental Status: She is alert and oriented to person, place, and time.   Psychiatric:         Mood and Affect: Mood normal.         Behavior: Behavior normal.       Significant Labs: Blood Culture:   Recent Labs   Lab 04/13/22  0309 04/18/22  1946 05/04/22  0944 05/04/22  0947 05/06/22  1155   LABBLOO No growth after 5 days.  No growth after 5 days. No  growth after 5 days.  No growth after 5 days. Gram stain abbe bottle: Gram positive cocci in clusters resembling Staph   Results called to and read back by: Linnette Calix- 3W 05/05/2022    09:58  METHICILLIN RESISTANT STAPHYLOCOCCUS AUREUS* No Growth after 4 days.  No Growth after 4 days.   No Growth after 4 days.        CBC:   Recent Labs   Lab 05/10/22  0458 05/11/22  0420   WBC 15.49* 15.68*   HGB 8.4* 7.4*   HCT 26.7* 23.4*   * 680*       CMP:   Recent Labs   Lab 05/10/22  0458 05/11/22  0420   * 135*   K 5.6* 5.4*    103   CO2 23 24   * 170*   BUN 11 10   CREATININE 0.8 0.7   CALCIUM 8.5* 8.5*   PROT 6.3  --    ALBUMIN 2.3*  --    BILITOT 0.2  --    ALKPHOS 96  --    AST 7*  --    ALT 11  --    ANIONGAP 8 8   EGFRNONAA >60 >60       Wound Culture:   Recent Labs   Lab 04/13/22  1234 05/04/22  1500 05/06/22  1249   LABAERO METHICILLIN RESISTANT STAPHYLOCOCCUS AUREUS  Many  Skin rosenda also present  * Results called to and read back by: Linnette Alvarado 05/06/2022  08:40  METHICILLIN RESISTANT STAPHYLOCOCCUS AUREUS  Many  *  Results called to and read back by: Linnette Alvarado 05/06/2022  08:40  ESCHERICHIA COLI  Many  *  ENTEROBACTER CLOACAE  Moderate  *  METHICILLIN RESISTANT STAPHYLOCOCCUS AUREUS  Many  * No growth         Significant Imaging: I have reviewed all pertinent imaging results/findings within the past 24 hours.

## 2022-05-11 NOTE — NURSING
PER handoff received from DANK Wiley    Pt resting in bed quietly. NAD noted. No c/o pain.  Fall and safety precautions maintained. Bed alarm activated and audible.. Bed locked in lowest position, with side rails up x2. Call bell and personal items within reach

## 2022-05-11 NOTE — PROGRESS NOTES
[facility] Medicine  Progress Note    Patient Name: Audrey Natarajan  MRN: [id number]  Patient Class: IP- Inpatient   Admission Date: 5/4/2022  Length of Stay: 7 days  Attending Physician: Scott Fuller MD  Primary Care Provider: Donaldo Pena MD        Subjective:     Principal Problem:Sepsis due to methicillin resistant Staphylococcus aureus (MRSA)        HPI:  Ms. Natarajan is a 51-year-old female with extensive past medical history including type 2 diabetes, hypertension, CKD, bipolar disorder, and recurrent foot infections who presents to the emergency department for evaluation of pain and worsening redness of her foot. Patient recently discharged from [facility] on April 26 after a complicated hospital stay due to psychosis and diabetic foot ulcer growing MRSA.  She was discharged home with her stepdaughter to continue antibiotics and follow-up.  The patient now states that her stepdaughter had taken her medications and hid them from her.  She states she just found her medications 3 days ago and started retaking her antibiotics but in the meantime, her feet wounds have worsened and she has noticed increased swelling and redness going up her legs.  She is also in significant pain.  She is very tearful.  She has felt feverish but no chills.  She denies any urinary symptoms.    In the emergency department, she was found to significant foot wounds.  See media tab.  She was also found to significant lab abnormalities including a WBC count of 20.5 1000, a sodium of 118, procalcitonin 0.34,  and . She was started IV antibiotics to cover her history of MRSA.  Medications were reviewed from previous hospital stay and restarted.  Podiatry was consulted.  She was admitted to hospital medicine for further management.      I spoke with her sister Anitra, and patient has not been taking her medications and has been accusing her stepdaughter - these are not true statements,  she feels like she is not able to take care of herself.       Overview/Hospital Course:  Mrs. Natarajan was admitted with sepsis, hyponatremia, and diabetic foot wounds.      Regarding sepsis and DM foot wounds: She was started on IV antibiotics with a history of MRSA. Blood culture 5/5 with Staph aureus in 1/4 cultures, repeat blood cultures negative. Podiatry consulted. MRI of L foot shows Charcot changes, difficult to rule out septic arthritis. MRI of R foot shows cellulitis and osteomyelitis of 1st digit. Patient declines amputation. Bone biopsy performed 5/6 with results no growth. Plan for further debridement in OR. ID consulted. Arterial US noted to be abnormal. Vascular surgery consulted as well.     Regarding hyponatremia: Due to carbamazepine. Na 113 at lowest. Nephrology consulted and started IVF. Na is slowly correcting. Psychiatry consulted due to stopping medication for bipolar disorder and concern for psychiatric disease becoming uncontrolled as a result. OK to continue divalproex, risperidone. No need for PEC.     She has also had anemia. Required transfusion 1U RBC on 5/5. No active GI bleeding noted. Resumed eliquis for bilateral DVTs noted 1 month ago.     Underwent debridement on 5/10 with Podiatry, plan to continue Vanc for now.       Interval History: doing well today, doesn't have much pain due to decreased sensation. Using the bathroom okay. No complaints.    Review of Systems   Constitutional:  Negative for chills and fever.   Respiratory:  Negative for shortness of breath.    Cardiovascular:  Positive for leg swelling. Negative for chest pain.   Gastrointestinal:  Negative for abdominal pain, constipation, diarrhea, nausea and vomiting.   Genitourinary:  Negative for difficulty urinating.   Musculoskeletal:  Negative for arthralgias, back pain and myalgias.   Skin:  Positive for wound.   Neurological:  Positive for weakness and numbness.   Psychiatric/Behavioral:  Negative for confusion.     Objective:     Vital Signs (Most Recent):  Temp: 99.1 °F (37.3 °C) (05/11/22 1229)  Pulse: 62 (05/11/22 1229)  Resp: 18 (05/11/22 1503)  BP: (!) 159/71 (05/11/22 1229)  SpO2: (!) 94 % (05/11/22 1229) Vital Signs (24h Range):  Temp:  [98.5 °F (36.9 °C)-99.1 °F (37.3 °C)] 99.1 °F (37.3 °C)  Pulse:  [62-82] 62  Resp:  [17-20] 18  SpO2:  [93 %-98 %] 94 %  BP: (135-178)/(61-77) 159/71     Weight: 126.4 kg (278 lb 10.6 oz)  Body mass index is 44.98 kg/m².    Intake/Output Summary (Last 24 hours) at 5/11/2022 1602  Last data filed at 5/11/2022 1229  Gross per 24 hour   Intake 2830.74 ml   Output --   Net 2830.74 ml        Physical Exam  Vitals and nursing note reviewed.   Constitutional:       General: She is not in acute distress.     Appearance: She is obese. She is not ill-appearing or toxic-appearing.   HENT:      Head: Normocephalic and atraumatic.      Nose: Nose normal.      Mouth/Throat:      Mouth: Mucous membranes are moist.   Cardiovascular:      Rate and Rhythm: Normal rate and regular rhythm.      Heart sounds: Normal heart sounds. No murmur heard.    No gallop.   Pulmonary:      Effort: Pulmonary effort is normal. No respiratory distress.      Breath sounds: Normal breath sounds. No wheezing or rales.      Comments: Room air  Abdominal:      General: Bowel sounds are normal. There is no distension.      Palpations: Abdomen is soft.      Tenderness: There is no abdominal tenderness. There is no guarding.   Musculoskeletal:      Right lower leg: Edema present.      Left lower leg: Edema present.   Skin:     General: Skin is warm and dry.      Comments: See images from Dr. Gill's note, evaluated with dressing changes   Neurological:      Mental Status: She is alert and oriented to person, place, and time.       Significant Labs: All pertinent labs within the past 24 hours have been reviewed.    Significant Imaging: I have reviewed all pertinent imaging results/findings within the past 24  "hours.      Assessment/Plan:      * Sepsis due to methicillin resistant Staphylococcus aureus (MRSA)  This patient does have evidence of infective focus- bilateral DM foot wounds and bacteremia  My overall impression is sepsis. Vital signs were reviewed and noted in progress note.  Antibiotics given-   Antibiotics (From admission, onward)            Start     Stop Route Frequency Ordered    05/09/22 1030  vancomycin 1.5 g in dextrose 5 % 250 mL IVPB (ready to mix)         -- IV Every 12 hours (non-standard times) 05/09/22 0345    05/04/22 1245  mupirocin 2 % ointment         05/09 0859 Nasl 2 times daily 05/04/22 1135    05/04/22 1103  vancomycin - pharmacy to dose  (vancomycin IVPB)        "And" Linked Group Details    -- IV pharmacy to manage frequency 05/04/22 1004        Cultures were taken-   Microbiology Results (last 7 days)     Procedure Component Value Units Date/Time    Blood culture [[id number]] Collected: 05/06/22 1155    Order Status: Completed Specimen: Blood from Antecubital, Right Hand Updated: 05/10/22 1303     Blood Culture, Routine No Growth after 4 days.     Blood culture [[id number]] Collected: 05/06/22 1155    Order Status: Completed Specimen: Blood from Peripheral, Left Hand Updated: 05/10/22 1303     Blood Culture, Routine No Growth after 4 days.     Aerobic culture [[id number]] Collected: 05/06/22 1249    Order Status: Completed Specimen: Bone from Toe, Right Foot Updated: 05/10/22 0734     Aerobic Bacterial Culture No growth    AFB Culture & Smear [[id number]] Collected: 05/06/22 1249    Order Status: Completed Specimen: Bone from Toe, Right Foot Updated: 05/09/22 1429     AFB Culture & Smear Culture in progress     AFB CULTURE STAIN No acid fast bacilli seen.    Blood culture x two cultures. Draw prior to antibiotics. [[id number]] Collected: 05/04/22 0947    Order Status: Completed Specimen: Blood from Peripheral, Antecubital, Left Updated: 05/08/22 1103     Blood Culture, Routine No Growth " after 4 days.     Narrative:      Aerobic and anaerobic    Blood culture x two cultures. Draw prior to antibiotics. [[id number]]  (Abnormal)  (Susceptibility) Collected: 05/04/22 0944    Order Status: Completed Specimen: Blood from Peripheral, Antecubital, Right Updated: 05/07/22 0809     Blood Culture, Routine Gram stain abbe bottle: Gram positive cocci in clusters resembling Staph       Results called to and read back by: Linnette KRAMER 05/05/2022        09:58      METHICILLIN RESISTANT STAPHYLOCOCCUS AUREUS    Narrative:      Aerobic and anaerobic    Gram stain [[id number]] Collected: 05/06/22 1249    Order Status: Completed Specimen: Bone from Toe, Right Foot Updated: 05/06/22 1446     Gram Stain Result No organisms seen    Fungus culture [[id number]] Collected: 05/06/22 1249    Order Status: Sent Specimen: Bone from Toe, Right Foot Updated: 05/06/22 1302    Aerobic culture [[id number]]  (Abnormal)  (Susceptibility) Collected: 05/04/22 1500    Order Status: Completed Specimen: Wound from Foot, Right Updated: 05/06/22 1126     Aerobic Bacterial Culture Results called to and read back by: Linnette Alvarado 05/06/2022  08:40      ESCHERICHIA COLI  Many        ENTEROBACTER CLOACAE  Moderate        METHICILLIN RESISTANT STAPHYLOCOCCUS AUREUS  Many      Aerobic culture [[id number]]  (Abnormal)  (Susceptibility) Collected: 05/04/22 1500    Order Status: Completed Specimen: Wound from Foot, Left Updated: 05/06/22 0844     Aerobic Bacterial Culture Results called to and read back by: Linnette Alvarado 05/06/2022  08:40      METHICILLIN RESISTANT STAPHYLOCOCCUS AUREUS  Many          Latest lactate reviewed, they are-  No results for input(s): LACTATE in the last 72 hours.  Staph bacteremia- repeat blood cultures. TTE no vegetation.    MRI L foot: mid/forefoot cellulitis, Charcot changes cannot rule out septic arthritis  MRI R foot: cellulitis forefoot and great toe. Osteomyelitis of 1st digit    Podiatry following.  Declines amputation. Plan for further operative debridement- anticipate sometime this week.   Vascular consulted for abnormal arterial US- No intervention at this time, recommending outpatient follow up or to be seen sooner if wounds are not healing  ID consulted- anticipate prolonged IV antibiotics, ok to d/c cipro and flagyl    Constipation  Bowel regimen ordered  Resolved     PAD (peripheral artery disease)  Arterial US abnormal  Vascular consulted - recs pending       Cellulitis of both feet  See sepsis      Iron deficiency anemia  Hgb decreased. No bleeding noted. Required 1U RBC transfusion on 5/5 with inappropriate response  - CBC in AM  - TSAT low       Osteomyelitis of right foot  See sepsis      Bacteremia due to Staphylococcus  See sepsis      Hyponatremia  Presents with a sodium of 118 which is new. Worsened to 113. She is currently asymptomatic. Cause= carbamazepine  - consulted Nephrology for help with management  - Nephro started fludrocortisone on 5/9  - carbamazepine added to allergy/intolerance list to prevent it from being prescribed again   - monitor BMP          Hyperkalemia  Sodium zirc ordered      Diabetic foot ulcer associated with type 2 diabetes mellitus  A1c:   Lab Results   Component Value Date    HGBA1C 7.0 (H) 04/13/2022     Meds: SSI PRN to maintain goal 140-180  ADA diet, accuchecks, hypoglycemic protocol          Chronic deep vein thrombosis (DVT) of both lower extremities  Present since 2021. Still present 4/2022  - resumed anticoagulation     Long term (current) use of anticoagulants  For chronic DVT, last visualized 4/2022  Resume anticoagulation as no active bleeding has been identified       Severe obesity (BMI >= 40)  Body mass index is 42.17 kg/m². Morbid obesity complicates all aspects of disease management from diagnostic modalities to treatment. Weight loss encouraged and health benefits explained to patient.         Thrombocytosis  Due to iron deficiency. Patient also  reports history of splenectomy.       Bipolar 1 disorder  Long history of bipolar 1 disorder with recent psychosis at last hospital stay.  Carbamazepine has caused hyponatremia.  - sister Anitra states patient has not been compliant with her medications and feels that she still has some paranoia that her stepdaughter had been hiding her medications though she believes it was the patient herself.  - Psych consulted- OK to continue current regimen of risperidone and depakote minus carbamazepine      Diabetic neuropathy  Noted. Contributes to foot infections      COPD (chronic obstructive pulmonary disease)  No signs of COPD exacerbation. No PFTs to review.   Resume home medications      Essential hypertension  BP generally controlled  Continue metoprolol         VTE Risk Mitigation (From admission, onward)         Ordered     apixaban tablet 5 mg  2 times daily         05/07/22 1110     IP VTE HIGH RISK PATIENT  Once         05/04/22 1259     Place sequential compression device  Until discontinued         05/04/22 1259                Discharge Planning   HUMBERTO:      Code Status: Full Code   Is the patient medically ready for discharge?:     Reason for patient still in hospital (select all that apply): Treatment  Discharge Plan A: Long-term acute care facility (LTAC)   Discharge Delays: (!) Post-Acute Set-up              Scott Fuller MD  Department of Hospital Medicine   [facility]

## 2022-05-11 NOTE — NURSING
PER handoff given to DANK Wiley     Pt resting in bed quietly. NAD noted.   Fall and safety precautions maintained. Bed alarm activated and audible.. Bed locked in lowest position, with side rails up x2. Call bell and personal items within reach

## 2022-05-11 NOTE — CONSULTS
"[facility] - Telemetry  Infectious Disease  Consult Note    Patient Name: Audrey Natarajan  MRN: [id number]  Admission Date: 5/4/2022  Hospital Length of Stay: 7 days  Attending Physician: Scott Fuller MD  Primary Care Provider: Donaldo Pena MD     Isolation Status: Contact    Patient information was obtained from patient and ER records.      Consults  Assessment/Plan:     Diabetic foot ulcer associated with type 2 diabetes mellitus  51F with h/o T2DM, biopolar disorder admitted 5/4 for b/l foot wounds. Of note, recently discharged from [facility] on 4/26/22 and patient reported unable to take medications as family member was hiding meds from her. S/p debridement with podiatry. Pt refuses amputation    Right plantar hallux- probe to periosteum fibrotic base  Left plantar foot ulceration with necrotic base deep probe to bone    MRI L foot- charcot changes, no abscess. Can not exclude septic arthritis of midfoot.    MRI R foot-  osteomyelitis of 1st digit    R wound swab- e.coli, enterobacter, mrsa  L wound swab- MRSA    BCx 5/4 MRSA, repeat 5/6 NGTD. 2d echo neg for vegetations.    R foot bone biopsy- culture NGTD. Path "Viable bone without inflammatory infiltrates"    Has received vanc/cipro/metronidazole since 5/4 (day #6)    JEYSON with  hemodynamically significant stenosis in the left and DPA. Multifocal mild to moderate grade stenoses is suspected throughout the left MIRACLE and, bilateral posterior tibial and peroneal arteries.    Needs at minimum 4 weeks iv vancomycin for complicated mrsa bacteremia. The bacteremia originated from foot, so MRSA OM likely present, although the mri changes could also be explained from stenosis. the polymicrobial wound culture was from bedside swab and likely represents colonization and wound    Recommendations:   - 6 weeks iv vancomycin for osteomyelitis/mrsa bacteremia  -  consider stopping cipro/metronidazole  - needs adequate perfusion to heal wound  - consult SW for " placement. Not candidate for home iv abx.    Outpatient Antibiotic Therapy Plan:    Please send referral to [facility].    1) Infection: complicated mrsa bacteremia, possible MRSA osteomyelitis foot    2) Discharge Antibiotics:    Intravenous antibiotics:   Iv vancomycin, pharmacy to dose      3) Therapy Duration:  6 weeks    Estimated end date of IV antibiotics: 6/14/22    4) Outpatient Weekly Labs:    Order the following labs to be drawn on Mondays:    CBC   CMP    ESR    CRP   Vancomycin trough. Target 15-20    If discharged on vancomycin IV, order the following additional labs to be drawn on Thursdays:   CMP    Vancomycin trough. Target 15-20    If vancomycin trough is not at target (15-20) prior to discharge, schedule vancomycin trough to be drawn before their fourth outpatient dose.    5) Fax Lab Results to Infectious Diseases Provider: Dr Zavala    [facility] Fax Number: [phone]    6) Outpatient Infectious Diseases Follow-up     Follow-up appointment will be arranged by the ID clinic and will be found in the patient's appointments tab.     Prior to discharge, please ensure the patient's follow-up has been scheduled.     If there is still no follow-up scheduled prior to discharge, please send an EPIC message to Leidy Brooks in Infectious Diseases.                  Thank you for your consult.ID will sign off. 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 The left foot culture collected 4/13 positive for MRSA and  misaldia magna. MRI without definitive evidence of osteo. Continued vanco and flagyl for 14 days total - if discharged would switch vanco to po doxy. Discharged from [place] on 4/26.  Presented to podiatry clinic for evaluation and care of bilateral foot wounds.  Sent to ED and admitted with a worsening foot infection. She was started on vancomycin, Cipro, and Flagyl (anaphylaxis with PCN, by report). ID is consulted for diabetic foot infection and bacteremia. The patient is feeling better. Malodor to foot. No fever or chills.      Interval History: NAEO. Awiating placement. Has picc. Tolerating abx.     Review of Systems   Constitutional:  Negative for chills and fever.   Skin:  Positive for wound.   Psychiatric/Behavioral:  Positive for behavioral problems. The patient is nervous/anxious.    All other systems reviewed and are negative.  Objective:     Vital Signs (Most Recent):  Temp: 99.1 °F (37.3 °C) (05/11/22 1229)  Pulse: 62 (05/11/22 1229)  Resp: 20 (05/11/22 1229)  BP: (!) 159/71 (05/11/22 1229)  SpO2: (!) 94 % (05/11/22 1229)   Vital Signs (24h Range):  Temp:  [98 °F (36.7 °C)-99.1 °F (37.3 °C)] 99.1 °F (37.3 °C)  Pulse:  [62-95] 62  Resp:  [17-20] 20  SpO2:  [93 %-98 %] 94 %  BP: (135-178)/(61-77) 159/71     Weight: 126.4 kg (278 lb 10.6 oz)  Body mass index is 44.98 kg/m².    Estimated Creatinine Clearance: 132.1 mL/min (based on SCr of 0.7 mg/dL).    Physical Exam  Constitutional:       Appearance: Normal appearance.   Eyes:      Extraocular Movements: Extraocular movements intact.      Conjunctiva/sclera: Conjunctivae normal.      Pupils: Pupils are equal, round, and reactive to light.   Cardiovascular:      Rate and Rhythm: Normal rate and regular rhythm.   Pulmonary:      Effort: Pulmonary effort is normal.      Breath sounds: Normal breath sounds.   Abdominal:      General: Abdomen is flat. There is no distension.   Musculoskeletal:         General: Deformity and signs of injury present.       Comments: Bilateral feet dressed   Skin:     General: Skin is warm and dry.      Comments: Picc line present- dressings c/d/i   Neurological:      General: No focal deficit present.      Mental Status: She is alert and oriented to person, place, and time.   Psychiatric:         Mood and Affect: Mood normal.         Behavior: Behavior normal.       Significant Labs: Blood Culture:   Recent Labs   Lab 04/13/22  0309 04/18/22  1946 05/04/22  0944 05/04/22  0947 05/06/22  1155   LABBLOO No growth after 5 days.  No growth after 5 days. No growth after 5 days.  No growth after 5 days. Gram stain abbe bottle: Gram positive cocci in clusters resembling Staph   Results called to and read back by: Linnette KRAMER 05/05/2022    09:58  METHICILLIN RESISTANT STAPHYLOCOCCUS AUREUS* No Growth after 4 days.  No Growth after 4 days.   No Growth after 4 days.        CBC:   Recent Labs   Lab 05/10/22  0458 05/11/22  0420   WBC 15.49* 15.68*   HGB 8.4* 7.4*   HCT 26.7* 23.4*   * 680*       CMP:   Recent Labs   Lab 05/10/22  0458 05/11/22  0420   * 135*   K 5.6* 5.4*    103   CO2 23 24   * 170*   BUN 11 10   CREATININE 0.8 0.7   CALCIUM 8.5* 8.5*   PROT 6.3  --    ALBUMIN 2.3*  --    BILITOT 0.2  --    ALKPHOS 96  --    AST 7*  --    ALT 11  --    ANIONGAP 8 8   EGFRNONAA >60 >60       Wound Culture:   Recent Labs   Lab 04/13/22  1234 05/04/22  1500 05/06/22  1249   LABAERO METHICILLIN RESISTANT STAPHYLOCOCCUS AUREUS  Many  Skin rosenda also present  * Results called to and read back by: Linnette Alvarado 05/06/2022  08:40  METHICILLIN RESISTANT STAPHYLOCOCCUS AUREUS  Many  *  Results called to and read back by: Linnette Alvarado 05/06/2022  08:40  ESCHERICHIA COLI  Many  *  ENTEROBACTER CLOACAE  Moderate  *  METHICILLIN RESISTANT STAPHYLOCOCCUS AUREUS  Many  * No growth         Significant Imaging: I have reviewed all pertinent imaging results/findings within the past 24 hours.

## 2022-05-11 NOTE — PLAN OF CARE
Placed call to [facility] extedened care, pending approval. TN to continue to follow up.     1:07pm per care port, [facility] extended care unable accept patient, care needs exceed current capacity.     TN placed call to patient insurance Elvira Cohn, [phone] ext [phone] left detailed voice message for Dee to confirm in network denials for placement.    TN spoke with Karishma with [employer] [facility] to notified denials and to inquire for single case agreement. Karishma stated to once the 3 in network denials are confirmed, she can submit for auth. TN to continue to follow up.    05/11/22 0819   Post-Acute Status   Post-Acute Authorization Placement   Post-Acute Placement Status Pending payor medical review/second level review   Discharge Delays (!) Post-Acute Set-up   Discharge Plan   Discharge Plan A Long-term acute care facility (LTAC)   Discharge Plan B Skilled Nursing Facility

## 2022-05-11 NOTE — ASSESSMENT & PLAN NOTE
"This patient does have evidence of infective focus- bilateral DM foot wounds and bacteremia  My overall impression is sepsis. Vital signs were reviewed and noted in progress note.  Antibiotics given-   Antibiotics (From admission, onward)            Start     Stop Route Frequency Ordered    05/09/22 1030  vancomycin 1.5 g in dextrose 5 % 250 mL IVPB (ready to mix)         -- IV Every 12 hours (non-standard times) 05/09/22 0345    05/04/22 1245  mupirocin 2 % ointment         05/09 0859 Nasl 2 times daily 05/04/22 1135    05/04/22 1103  vancomycin - pharmacy to dose  (vancomycin IVPB)        "And" Linked Group Details    -- IV pharmacy to manage frequency 05/04/22 1004        Cultures were taken-   Microbiology Results (last 7 days)     Procedure Component Value Units Date/Time    Blood culture [[id number]] Collected: 05/06/22 1155    Order Status: Completed Specimen: Blood from Antecubital, Right Hand Updated: 05/10/22 1303     Blood Culture, Routine No Growth after 4 days.     Blood culture [[id number]] Collected: 05/06/22 1155    Order Status: Completed Specimen: Blood from Peripheral, Left Hand Updated: 05/10/22 1303     Blood Culture, Routine No Growth after 4 days.     Aerobic culture [[id number]] Collected: 05/06/22 1249    Order Status: Completed Specimen: Bone from Toe, Right Foot Updated: 05/10/22 0734     Aerobic Bacterial Culture No growth    AFB Culture & Smear [[id number]] Collected: 05/06/22 1249    Order Status: Completed Specimen: Bone from Toe, Right Foot Updated: 05/09/22 1429     AFB Culture & Smear Culture in progress     AFB CULTURE STAIN No acid fast bacilli seen.    Blood culture x two cultures. Draw prior to antibiotics. [[id number]] Collected: 05/04/22 0947    Order Status: Completed Specimen: Blood from Peripheral, Antecubital, Left Updated: 05/08/22 1103     Blood Culture, Routine No Growth after 4 days.     Narrative:      Aerobic and anaerobic    Blood culture x two cultures. Draw prior to " antibiotics. [[id number]]  (Abnormal)  (Susceptibility) Collected: 05/04/22 0944    Order Status: Completed Specimen: Blood from Peripheral, Antecubital, Right Updated: 05/07/22 0809     Blood Culture, Routine Gram stain abbe bottle: Gram positive cocci in clusters resembling Staph       Results called to and read back by: Linnette KRAMER 05/05/2022        09:58      METHICILLIN RESISTANT STAPHYLOCOCCUS AUREUS    Narrative:      Aerobic and anaerobic    Gram stain [[id number]] Collected: 05/06/22 1249    Order Status: Completed Specimen: Bone from Toe, Right Foot Updated: 05/06/22 1446     Gram Stain Result No organisms seen    Fungus culture [[id number]] Collected: 05/06/22 1249    Order Status: Sent Specimen: Bone from Toe, Right Foot Updated: 05/06/22 1302    Aerobic culture [[id number]]  (Abnormal)  (Susceptibility) Collected: 05/04/22 1500    Order Status: Completed Specimen: Wound from Foot, Right Updated: 05/06/22 1126     Aerobic Bacterial Culture Results called to and read back by: Linnette Alvarado 05/06/2022  08:40      ESCHERICHIA COLI  Many        ENTEROBACTER CLOACAE  Moderate        METHICILLIN RESISTANT STAPHYLOCOCCUS AUREUS  Many      Aerobic culture [[id number]]  (Abnormal)  (Susceptibility) Collected: 05/04/22 1500    Order Status: Completed Specimen: Wound from Foot, Left Updated: 05/06/22 0844     Aerobic Bacterial Culture Results called to and read back by: Linnette Alvarado 05/06/2022  08:40      METHICILLIN RESISTANT STAPHYLOCOCCUS AUREUS  Many          Latest lactate reviewed, they are-  No results for input(s): LACTATE in the last 72 hours.  Staph bacteremia- repeat blood cultures. TTE no vegetation.    MRI L foot: mid/forefoot cellulitis, Charcot changes cannot rule out septic arthritis  MRI R foot: cellulitis forefoot and great toe. Osteomyelitis of 1st digit    Podiatry following. Declines amputation. Plan for further operative debridement- anticipate sometime this week.   Vascular  consulted for abnormal arterial US- No intervention at this time, recommending outpatient follow up or to be seen sooner if wounds are not healing  ID consulted- anticipate prolonged IV antibiotics, ok to d/c cipro and flagyl

## 2022-05-11 NOTE — PLAN OF CARE
Pt is AAOx4.   No falls or new injuries reported during shift, safety precautions maintained.     Problem: Adult Inpatient Plan of Care  Goal: Plan of Care Review  Outcome: Ongoing, Progressing     Problem: Adult Inpatient Plan of Care  Goal: Optimal Comfort and Wellbeing  Outcome: Ongoing, Progressing

## 2022-05-11 NOTE — ASSESSMENT & PLAN NOTE
"51F with h/o T2DM, biopolar disorder admitted 5/4 for b/l foot wounds. Of note, recently discharged from [facility] on 4/26/22 and patient reported unable to take medications as family member was hiding meds from her. S/p debridement with podiatry. Pt refuses amputation    Right plantar hallux- probe to periosteum fibrotic base  Left plantar foot ulceration with necrotic base deep probe to bone    MRI L foot- charcot changes, no abscess. Can not exclude septic arthritis of midfoot.    MRI R foot-  osteomyelitis of 1st digit    R wound swab- e.coli, enterobacter, mrsa  L wound swab- MRSA    BCx 5/4 MRSA, repeat 5/6 NGTD. 2d echo neg for vegetations.    R foot bone biopsy- culture NGTD. Path "Viable bone without inflammatory infiltrates"    Has received vanc/cipro/metronidazole since 5/4 (day #6)    JEYSON with  hemodynamically significant stenosis in the left and DPA. Multifocal mild to moderate grade stenoses is suspected throughout the left MIRACLE and, bilateral posterior tibial and peroneal arteries.    Needs at minimum 4 weeks iv vancomycin for complicated mrsa bacteremia. The bacteremia originated from foot, so MRSA OM likely present, although the mri changes could also be explained from stenosis. the polymicrobial wound culture was from bedside swab and likely represents colonization and wound    Recommendations:   - 6 weeks iv vancomycin for osteomyelitis/mrsa bacteremia  -  consider stopping cipro/metronidazole  - needs adequate perfusion to heal wound  - consult SW for placement. Not candidate for home iv abx.    Outpatient Antibiotic Therapy Plan:    Please send referral to [facility].    1) Infection: complicated mrsa bacteremia, possible MRSA osteomyelitis foot    2) Discharge Antibiotics:    Intravenous antibiotics:   Iv vancomycin, pharmacy to dose      3) Therapy Duration:  6 weeks    Estimated end date of IV antibiotics: 6/14/22    4) Outpatient Weekly Labs:    Order the " following labs to be drawn on Mondays:    CBC   CMP    ESR    CRP   Vancomycin trough. Target 15-20    If discharged on vancomycin IV, order the following additional labs to be drawn on Thursdays:   CMP    Vancomycin trough. Target 15-20    If vancomycin trough is not at target (15-20) prior to discharge, schedule vancomycin trough to be drawn before their fourth outpatient dose.    5) Fax Lab Results to Infectious Diseases Provider: Dr Zavala    [facility] Fax Number: [phone]    6) Outpatient Infectious Diseases Follow-up     Follow-up appointment will be arranged by the ID clinic and will be found in the patient's appointments tab.     Prior to discharge, please ensure the patient's follow-up has been scheduled.     If there is still no follow-up scheduled prior to discharge, please send an EPIC message to Leidy Brooks in Infectious Diseases.

## 2022-05-12 LAB
ANION GAP SERPL CALC-SCNC: 6 MMOL/L (ref 8–16)
BASOPHILS # BLD AUTO: 0.07 K/UL (ref 0–0.2)
BASOPHILS NFR BLD: 0.5 % (ref 0–1.9)
BUN SERPL-MCNC: 10 MG/DL (ref 6–20)
CALCIUM SERPL-MCNC: 8.6 MG/DL (ref 8.7–10.5)
CHLORIDE SERPL-SCNC: 103 MMOL/L (ref 95–110)
CO2 SERPL-SCNC: 27 MMOL/L (ref 23–29)
CREAT SERPL-MCNC: 0.7 MG/DL (ref 0.5–1.4)
DIFFERENTIAL METHOD: ABNORMAL
EOSINOPHIL # BLD AUTO: 0.4 K/UL (ref 0–0.5)
EOSINOPHIL NFR BLD: 2.5 % (ref 0–8)
ERYTHROCYTE [DISTWIDTH] IN BLOOD BY AUTOMATED COUNT: 18 % (ref 11.5–14.5)
EST. GFR  (AFRICAN AMERICAN): >60 ML/MIN/1.73 M^2
EST. GFR  (NON AFRICAN AMERICAN): >60 ML/MIN/1.73 M^2
GLUCOSE SERPL-MCNC: 136 MG/DL (ref 70–110)
HCT VFR BLD AUTO: 24 % (ref 37–48.5)
HGB BLD-MCNC: 7.8 G/DL (ref 12–16)
IMM GRANULOCYTES # BLD AUTO: 0.23 K/UL (ref 0–0.04)
IMM GRANULOCYTES NFR BLD AUTO: 1.6 % (ref 0–0.5)
LYMPHOCYTES # BLD AUTO: 3.9 K/UL (ref 1–4.8)
LYMPHOCYTES NFR BLD: 27.3 % (ref 18–48)
MAGNESIUM SERPL-MCNC: 1.6 MG/DL (ref 1.6–2.6)
MCH RBC QN AUTO: 27.5 PG (ref 27–31)
MCHC RBC AUTO-ENTMCNC: 32.5 G/DL (ref 32–36)
MCV RBC AUTO: 85 FL (ref 82–98)
MONOCYTES # BLD AUTO: 1.9 K/UL (ref 0.3–1)
MONOCYTES NFR BLD: 13.2 % (ref 4–15)
NEUTROPHILS # BLD AUTO: 7.9 K/UL (ref 1.8–7.7)
NEUTROPHILS NFR BLD: 54.9 % (ref 38–73)
NRBC BLD-RTO: 0 /100 WBC
PLATELET # BLD AUTO: 729 K/UL (ref 150–450)
PMV BLD AUTO: 9 FL (ref 9.2–12.9)
POCT GLUCOSE: 134 MG/DL (ref 70–110)
POCT GLUCOSE: 154 MG/DL (ref 70–110)
POCT GLUCOSE: 158 MG/DL (ref 70–110)
POCT GLUCOSE: 169 MG/DL (ref 70–110)
POTASSIUM SERPL-SCNC: 5.1 MMOL/L (ref 3.5–5.1)
RBC # BLD AUTO: 2.84 M/UL (ref 4–5.4)
SODIUM SERPL-SCNC: 136 MMOL/L (ref 136–145)
VANCOMYCIN TROUGH SERPL-MCNC: 15.9 UG/ML (ref 10–22)
WBC # BLD AUTO: 14.36 K/UL (ref 3.9–12.7)

## 2022-05-12 PROCEDURE — 25000003 PHARM REV CODE 250: Performed by: PODIATRIST

## 2022-05-12 PROCEDURE — 94640 AIRWAY INHALATION TREATMENT: CPT

## 2022-05-12 PROCEDURE — 85025 COMPLETE CBC W/AUTO DIFF WBC: CPT | Performed by: PODIATRIST

## 2022-05-12 PROCEDURE — 36569 INSJ PICC 5 YR+ W/O IMAGING: CPT

## 2022-05-12 PROCEDURE — 27000207 HC ISOLATION

## 2022-05-12 PROCEDURE — 99232 SBSQ HOSP IP/OBS MODERATE 35: CPT | Mod: SA,HB,, | Performed by: PHYSICIAN ASSISTANT

## 2022-05-12 PROCEDURE — 36415 COLL VENOUS BLD VENIPUNCTURE: CPT | Performed by: PODIATRIST

## 2022-05-12 PROCEDURE — A4216 STERILE WATER/SALINE, 10 ML: HCPCS | Performed by: STUDENT IN AN ORGANIZED HEALTH CARE EDUCATION/TRAINING PROGRAM

## 2022-05-12 PROCEDURE — 83735 ASSAY OF MAGNESIUM: CPT | Performed by: PODIATRIST

## 2022-05-12 PROCEDURE — 25000003 PHARM REV CODE 250: Performed by: STUDENT IN AN ORGANIZED HEALTH CARE EDUCATION/TRAINING PROGRAM

## 2022-05-12 PROCEDURE — 21400001 HC TELEMETRY ROOM

## 2022-05-12 PROCEDURE — 80202 ASSAY OF VANCOMYCIN: CPT | Performed by: PODIATRIST

## 2022-05-12 PROCEDURE — 25000003 PHARM REV CODE 250: Performed by: HOSPITALIST

## 2022-05-12 PROCEDURE — 80048 BASIC METABOLIC PNL TOTAL CA: CPT | Performed by: STUDENT IN AN ORGANIZED HEALTH CARE EDUCATION/TRAINING PROGRAM

## 2022-05-12 PROCEDURE — 63600175 PHARM REV CODE 636 W HCPCS: Performed by: PODIATRIST

## 2022-05-12 PROCEDURE — C1751 CATH, INF, PER/CENT/MIDLINE: HCPCS

## 2022-05-12 PROCEDURE — 99232 PR SUBSEQUENT HOSPITAL CARE,LEVL II: ICD-10-PCS | Mod: SA,HB,, | Performed by: PHYSICIAN ASSISTANT

## 2022-05-12 RX ORDER — SODIUM CHLORIDE 0.9 % (FLUSH) 0.9 %
10 SYRINGE (ML) INJECTION
Status: DISCONTINUED | OUTPATIENT
Start: 2022-05-12 | End: 2022-05-13 | Stop reason: HOSPADM

## 2022-05-12 RX ORDER — SODIUM CHLORIDE 0.9 % (FLUSH) 0.9 %
10 SYRINGE (ML) INJECTION EVERY 6 HOURS
Status: DISCONTINUED | OUTPATIENT
Start: 2022-05-12 | End: 2022-05-13 | Stop reason: HOSPADM

## 2022-05-12 RX ADMIN — Medication 10 ML: at 05:05

## 2022-05-12 RX ADMIN — FLUTICASONE PROPIONATE 100 MCG: 50 SPRAY, METERED NASAL at 08:05

## 2022-05-12 RX ADMIN — PRAVASTATIN SODIUM 40 MG: 40 TABLET ORAL at 08:05

## 2022-05-12 RX ADMIN — APIXABAN 5 MG: 5 TABLET, FILM COATED ORAL at 08:05

## 2022-05-12 RX ADMIN — OXYCODONE AND ACETAMINOPHEN 1 TABLET: 7.5; 325 TABLET ORAL at 05:05

## 2022-05-12 RX ADMIN — FLUTICASONE FUROATE AND VILANTEROL TRIFENATATE 1 PUFF: 100; 25 POWDER RESPIRATORY (INHALATION) at 07:05

## 2022-05-12 RX ADMIN — VANCOMYCIN HYDROCHLORIDE 1500 MG: 1.5 INJECTION, POWDER, LYOPHILIZED, FOR SOLUTION INTRAVENOUS at 11:05

## 2022-05-12 RX ADMIN — METOPROLOL TARTRATE 50 MG: 50 TABLET, FILM COATED ORAL at 08:05

## 2022-05-12 RX ADMIN — OXYCODONE AND ACETAMINOPHEN 1 TABLET: 7.5; 325 TABLET ORAL at 02:05

## 2022-05-12 RX ADMIN — HYDROXYZINE PAMOATE 50 MG: 25 CAPSULE ORAL at 03:05

## 2022-05-12 RX ADMIN — ASPIRIN 81 MG CHEWABLE TABLET 81 MG: 81 TABLET CHEWABLE at 08:05

## 2022-05-12 RX ADMIN — DIVALPROEX SODIUM 1250 MG: 250 TABLET, DELAYED RELEASE ORAL at 08:05

## 2022-05-12 RX ADMIN — RISPERIDONE 2 MG: 1 TABLET ORAL at 08:05

## 2022-05-12 RX ADMIN — PANTOPRAZOLE SODIUM 40 MG: 40 TABLET, DELAYED RELEASE ORAL at 08:05

## 2022-05-12 RX ADMIN — OXYCODONE AND ACETAMINOPHEN 1 TABLET: 7.5; 325 TABLET ORAL at 10:05

## 2022-05-12 RX ADMIN — CETIRIZINE HYDROCHLORIDE 5 MG: 5 TABLET ORAL at 08:05

## 2022-05-12 RX ADMIN — SODIUM ZIRCONIUM CYCLOSILICATE 5 G: 5 POWDER, FOR SUSPENSION ORAL at 08:05

## 2022-05-12 RX ADMIN — HYDROXYZINE PAMOATE 50 MG: 25 CAPSULE ORAL at 08:05

## 2022-05-12 RX ADMIN — OXYCODONE AND ACETAMINOPHEN 1 TABLET: 7.5; 325 TABLET ORAL at 08:05

## 2022-05-12 RX ADMIN — RISPERIDONE 3 MG: 1 TABLET ORAL at 08:05

## 2022-05-12 RX ADMIN — SENNOSIDES AND DOCUSATE SODIUM 2 TABLET: 50; 8.6 TABLET ORAL at 08:05

## 2022-05-12 RX ADMIN — DIVALPROEX SODIUM 500 MG: 250 TABLET, DELAYED RELEASE ORAL at 08:05

## 2022-05-12 RX ADMIN — VANCOMYCIN HYDROCHLORIDE 1500 MG: 1.5 INJECTION, POWDER, LYOPHILIZED, FOR SOLUTION INTRAVENOUS at 10:05

## 2022-05-12 RX ADMIN — OXYCODONE AND ACETAMINOPHEN 1 TABLET: 7.5; 325 TABLET ORAL at 01:05

## 2022-05-12 RX ADMIN — FLUDROCORTISONE ACETATE 100 MCG: 0.1 TABLET ORAL at 08:05

## 2022-05-12 NOTE — ASSESSMENT & PLAN NOTE
Pt is currently stable on Depakote  mg q AM and 1250 mg q PM along with risperidone 2 mg q AM and 3 mg q PM.  If still here get a VPA level in 3 more days.  She seems very aware of her surroundings and understands her medical condition and possible need for amputation.  There is no need for PEC or psych facility transfer at this time.    5/12/2022- Pt doing well on Depakote  mg q AM and 1250 mg q PM along with risperidone 2 mg q AM and 3 mg q PM. VPA level subtherapeutic 3 days ago but pt doing well so will not increase dose of Depakote.  Continue current care.

## 2022-05-12 NOTE — PLAN OF CARE
05/12/22 1610   Discharge Reassessment   Assessment Type Discharge Planning Assessment   Did the patient's condition or plan change since previous assessment? No   Discharge Plan discussed with: Patient   Communicated HUMBERTO with patient/caregiver Yes   Discharge Plan A Long-term acute care facility (LTAC)   Discharge Plan B Skilled Nursing Facility   DME Needed Upon Discharge  none   Discharge Barriers Identified Other (see comments);Mental illness  (Needed 3 in network denials to get patient to [facility])   Why the patient remains in the hospital Requires continued medical care   Post-Acute Status   Post-Acute Authorization Placement   Post-Acute Placement Status Pending payor review/awaiting authorization (if required)   [employer] submitted for auth

## 2022-05-12 NOTE — SUBJECTIVE & OBJECTIVE
Interval History: No new issues, doing well today.     Review of Systems   Constitutional:  Negative for chills and fever.   Respiratory:  Negative for shortness of breath.    Cardiovascular:  Positive for leg swelling. Negative for chest pain.   Gastrointestinal:  Negative for abdominal pain, constipation, diarrhea, nausea and vomiting.   Genitourinary:  Negative for difficulty urinating.   Musculoskeletal:  Negative for arthralgias, back pain and myalgias.   Skin:  Positive for wound.   Neurological:  Positive for weakness and numbness.   Psychiatric/Behavioral:  Negative for confusion.    Objective:     Vital Signs (Most Recent):  Temp: 98.4 °F (36.9 °C) (05/12/22 1154)  Pulse: 63 (05/12/22 1154)  Resp: 20 (05/12/22 1154)  BP: (!) 159/72 (05/12/22 1154)  SpO2: (!) 93 % (05/12/22 1154) Vital Signs (24h Range):  Temp:  [98.4 °F (36.9 °C)-99.5 °F (37.5 °C)] 98.4 °F (36.9 °C)  Pulse:  [63-90] 63  Resp:  [18-20] 20  SpO2:  [91 %-96 %] 93 %  BP: (141-169)/(63-72) 159/72     Weight: 126.4 kg (278 lb 10.6 oz)  Body mass index is 44.98 kg/m².    Intake/Output Summary (Last 24 hours) at 5/12/2022 1314  Last data filed at 5/12/2022 0200  Gross per 24 hour   Intake 540 ml   Output 1200 ml   Net -660 ml        Physical Exam  Vitals and nursing note reviewed.   Constitutional:       General: She is not in acute distress.     Appearance: She is obese. She is not ill-appearing or toxic-appearing.   HENT:      Head: Normocephalic and atraumatic.      Nose: Nose normal.      Mouth/Throat:      Mouth: Mucous membranes are moist.   Cardiovascular:      Rate and Rhythm: Normal rate and regular rhythm.      Heart sounds: Normal heart sounds. No murmur heard.    No gallop.   Pulmonary:      Effort: Pulmonary effort is normal. No respiratory distress.      Breath sounds: Normal breath sounds. No wheezing or rales.      Comments: Room air  Abdominal:      General: Bowel sounds are normal. There is no distension.      Palpations: Abdomen  is soft.      Tenderness: There is no abdominal tenderness. There is no guarding.   Musculoskeletal:      Right lower leg: Edema present.      Left lower leg: Edema present.   Feet:      Comments: Bilateral dressings intact  Skin:     General: Skin is warm and dry.   Neurological:      Mental Status: She is alert and oriented to person, place, and time.       Significant Labs: All pertinent labs within the past 24 hours have been reviewed.    Significant Imaging: I have reviewed all pertinent imaging results/findings within the past 24 hours.
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
## 2022-05-12 NOTE — PROGRESS NOTES
Pharmacokinetic Assessment Follow Up: IV Vancomycin    Vancomycin serum concentration assessment(s):    The trough level was drawn correctly and can be used to guide therapy at this time. The measurement is within the desired definitive target range of 15 to 20 mcg/mL.    Vancomycin Regimen Plan:    Continue regimen to Vancomycin 1500 mg IV every 12 hours with next serum trough concentration measured at 2130 prior to 6th dose on 5/14/2022    Drug levels (last 3 results):  Recent Labs   Lab Result Units 05/10/22  0959 05/12/22  0944   Vancomycin-Trough ug/mL 16.3 15.9       Pharmacy will continue to follow and monitor vancomycin.    Please contact pharmacy at extension [phone] for questions regarding this assessment.    Thank you for the consult,   Ja Carroll Jr       Patient brief summary:  Audrey Natarajan is a 51 y.o. female initiated on antimicrobial therapy with IV Vancomycin for treatment of skin & soft tissue infection    Drug Allergies:   Review of patient's allergies indicates:   Allergen Reactions    Morphine Other (See Comments)     Patient had a psychotic episode after taking Morphine  Agitation, hallucinations    Penicillins Anaphylaxis     itching    Januvia [sitagliptin] Hives    Carbamazepine Other (See Comments)     hyponatremia       Actual Body Weight:   126.4 kg    Renal Function:   Estimated Creatinine Clearance: 132.1 mL/min (based on SCr of 0.7 mg/dL).,     Dialysis Method (if applicable):  N/A    CBC (last 72 hours):  Recent Labs   Lab Result Units 05/10/22  0458 05/11/22  0420 05/12/22  0509   WBC K/uL 15.49* 15.68* 14.36*   Hemoglobin g/dL 8.4* 7.4* 7.8*   Hematocrit % 26.7* 23.4* 24.0*   Platelets K/uL 645* 680* 729*   Gran % % 53.7 61.2 54.9   Lymph % % 27.2 22.1 27.3   Mono % % 12.8 11.5 13.2   Eosinophil % % 2.3 2.0 2.5   Basophil % % 0.6 0.5 0.5   Differential Method  Automated Automated Automated       Metabolic Panel (last 72 hours):  Recent Labs   Lab Result Units  05/10/22  0458 05/11/22  0420 05/12/22  0509   Sodium mmol/L 133* 135* 136   Potassium mmol/L 5.6* 5.4* 5.1   Chloride mmol/L 102 103 103   CO2 mmol/L 23 24 27   Glucose mg/dL 164* 170* 136*   BUN mg/dL 11 10 10   Creatinine mg/dL 0.8 0.7 0.7   Albumin g/dL 2.3*  --   --    Total Bilirubin mg/dL 0.2  --   --    Alkaline Phosphatase U/L 96  --   --    AST U/L 7*  --   --    ALT U/L 11  --   --    Magnesium mg/dL 1.6 1.6 1.6       Vancomycin Administrations:  vancomycin given in the last 96 hours                     vancomycin 1.5 g in dextrose 5 % 250 mL IVPB (ready to mix) (mg) 1,500 mg New Bag 05/12/22 1126     1,500 mg New Bag 05/11/22 2237     1,500 mg New Bag  1005     1,500 mg New Bag 05/10/22 2208     1,500 mg New Bag  1106      Restarted 05/09/22 2325     1,500 mg New Bag  2304     1,500 mg New Bag  0950    vancomycin (VANCOCIN) 1,750 mg in dextrose 5 % 500 mL IVPB (mg) 1,750 mg New Bag 05/08/22 2203                    Microbiologic Results:  Microbiology Results (last 7 days)       Procedure Component Value Units Date/Time    Blood culture [[id number]] Collected: 05/06/22 1155    Order Status: Completed Specimen: Blood from Antecubital, Right Hand Updated: 05/10/22 1303     Blood Culture, Routine No Growth after 4 days.     Blood culture [[id number]] Collected: 05/06/22 1155    Order Status: Completed Specimen: Blood from Peripheral, Left Hand Updated: 05/10/22 1303     Blood Culture, Routine No Growth after 4 days.     Aerobic culture [[id number]] Collected: 05/06/22 1249    Order Status: Completed Specimen: Bone from Toe, Right Foot Updated: 05/10/22 0734     Aerobic Bacterial Culture No growth    AFB Culture & Smear [[id number]] Collected: 05/06/22 1249    Order Status: Completed Specimen: Bone from Toe, Right Foot Updated: 05/09/22 1429     AFB Culture & Smear Culture in progress     AFB CULTURE STAIN No acid fast bacilli seen.    Blood culture x two cultures. Draw prior to antibiotics. [[id number]]  Collected: 05/04/22 0947    Order Status: Completed Specimen: Blood from Peripheral, Antecubital, Left Updated: 05/08/22 1103     Blood Culture, Routine No Growth after 4 days.     Narrative:      Aerobic and anaerobic    Blood culture x two cultures. Draw prior to antibiotics. [[id number]]  (Abnormal)  (Susceptibility) Collected: 05/04/22 0944    Order Status: Completed Specimen: Blood from Peripheral, Antecubital, Right Updated: 05/07/22 0809     Blood Culture, Routine Gram stain abbe bottle: Gram positive cocci in clusters resembling Staph       Results called to and read back by: Linnette KRAMER 05/05/2022        09:58      METHICILLIN RESISTANT STAPHYLOCOCCUS AUREUS    Narrative:      Aerobic and anaerobic    Gram stain [[id number]] Collected: 05/06/22 1249    Order Status: Completed Specimen: Bone from Toe, Right Foot Updated: 05/06/22 1446     Gram Stain Result No organisms seen    Fungus culture [[id number]] Collected: 05/06/22 1249    Order Status: Sent Specimen: Bone from Toe, Right Foot Updated: 05/06/22 1302    Aerobic culture [[id number]]  (Abnormal)  (Susceptibility) Collected: 05/04/22 1500    Order Status: Completed Specimen: Wound from Foot, Right Updated: 05/06/22 1126     Aerobic Bacterial Culture Results called to and read back by: Linnette Alvarado 05/06/2022  08:40      ESCHERICHIA COLI  Many        ENTEROBACTER CLOACAE  Moderate        METHICILLIN RESISTANT STAPHYLOCOCCUS AUREUS  Many      Aerobic culture [[id number]]  (Abnormal)  (Susceptibility) Collected: 05/04/22 1500    Order Status: Completed Specimen: Wound from Foot, Left Updated: 05/06/22 0844     Aerobic Bacterial Culture Results called to and read back by: Linnette Alvarado 05/06/2022  08:40      METHICILLIN RESISTANT STAPHYLOCOCCUS AUREUS  Many

## 2022-05-12 NOTE — SUBJECTIVE & OBJECTIVE
Patient History               Medical as of 5/12/2022       Past Medical History       Diagnosis Date Comments Source    ADHD (attention deficit hyperactivity disorder) -- -- Provider    Arthritis -- -- Provider    Asthma -- -- Provider    Bipolar 1 disorder -- -- Provider    Cataract -- -- Provider    Cigarette smoker -- -- Provider    COPD (chronic obstructive pulmonary disease) -- -- Provider    Coronary artery disease -- A fib Provider    Depression -- bipolar manic depresson Provider    Diabetes mellitus -- -- Provider    Diabetic foot ulcers -- -- Provider    Diabetic neuropathy -- -- Provider    DVT of lower extremity, bilateral 07/2013 bilateral LE DVT. [place] filter placed.  Provider    Encounter for blood transfusion -- -- Provider    History of blood clots 1. Left Leg=2003; 2.Bilateral Groin=Blood Clots= 5 or 6/ 2013 & 7/2013; 3. LLL of Lung=7/2013;  4. Lt. Lower Leg=7/2013.  Pt. had 1st Blood Clot after Pmecdprwnqeb=9035, & Last=2013. [place] Filter= Rt.Lateral Neck. Provider    HTN (hypertension) 06/06/2013 Pt states that she does not have hypertension Provider    Hypercholesteremia -- -- Provider    Irregular heartbeat -- -- Provider    Neuromuscular disorder -- neuropathy feet Provider    Obese -- -- Provider    PE (pulmonary embolism) 07/2013 bilat LE DVT.  Provider    Restless leg syndrome -- -- Provider              Pertinent Negatives       Diagnosis Date Noted Comments Source    Amblyopia 06/19/2015 -- Provider    Diabetic retinopathy 06/19/2015 -- Provider    Glaucoma 06/19/2015 -- Provider    Macular degeneration 06/19/2015 -- Provider    Myocardial infarction 11/28/2014 -- Provider    Retinal detachment 06/19/2015 -- Provider    Sickle cell anemia 06/19/2015 -- Provider    Sickle cell trait 06/19/2015 -- Provider    Strabismus 06/19/2015 -- Provider    Uveitis 06/19/2015 -- Provider                          Surgical as of 5/12/2022       Past Surgical History       Procedure  "Laterality Date Comments Source    SPLENECTOMY, TOTAL -- 2003 -- Provider    VEIN SURGERY --  Lt leg Provider    Green' s filter [Other] Right 2012 Right Neck & Tunneled Down. Provider    TONSILLECTOMY -- -- as a child Provider    ABDOMINAL SURGERY --  gastric sleeve Provider    OVARIAN CYST REMOVAL -- 3/13/2014 -- Provider    HERNIA REPAIR -- -- "[place] [facility] around th [place].", pt. states Provider    BILATERAL OOPHORECTOMY Bilateral 2015 -- Provider    [place] REMOVAL OF OVARY/TUBE(S) -- -- -- Provider    TYMPANOSTOMY TUBE PLACEMENT --  -- Provider    LAPAROSCOPIC CHOLECYSTECTOMY N/A 9/10/2020 Procedure: CHOLECYSTECTOMY, LAPAROSCOPIC;  Surgeon: Montrell Gutierrez MD;  Location: [facility] OR;  Service: General;  Laterality: N/A;  RN PREOP ----COVID Negative   Provider    CHOLECYSTECTOMY -- -- -- Provider    DEBRIDEMENT OF FOOT Bilateral 5/10/2022 Procedure: DEBRIDEMENT, FOOT;  Surgeon: Maira De Los Santos DPM;  Location: [facility] OR;  Service: Podiatry;  Laterality: Bilateral; Provider                          Family as of 2022       Problem Relation Name Age of Onset Comments Source    Hypertension Father -- -- -- Provider    Diabetes Father -- -- -- Provider    Heart disease Father -- -- -- Provider    Cataracts Father -- -- -- Provider    Diabetes Paternal Grandfather -- -- -- Provider    Heart disease Paternal Grandfather -- -- -- Provider    No Known Problems Mother -- -- -- Provider    Ovarian cancer Maternal Grandmother -- --  from this. ? age  Provider    No Known Problems Sister -- -- -- Provider    No Known Problems Brother -- -- -- Provider    No Known Problems Maternal Aunt -- -- -- Provider    No Known Problems Maternal Uncle -- -- -- Provider    No Known Problems Paternal Aunt -- -- -- Provider    No Known Problems Paternal Uncle -- -- -- Provider    No Known Problems Maternal Grandfather -- -- -- Provider    Ovarian cancer Paternal Grandmother -- -- -- Provider "    Uterine cancer Neg Hx -- -- -- Provider    Breast cancer Neg Hx -- -- -- Provider    Colon cancer Neg Hx -- -- -- Provider    Amblyopia Neg Hx -- -- -- Provider    Blindness Neg Hx -- -- -- Provider    Cancer Neg Hx -- -- -- Provider    Glaucoma Neg Hx -- -- -- Provider    Macular degeneration Neg Hx -- -- -- Provider    Retinal detachment Neg Hx -- -- -- Provider    Strabismus Neg Hx -- -- -- Provider    Stroke Neg Hx -- -- -- Provider    Thyroid disease Neg Hx -- -- -- Provider                  Tobacco Use as of 5/12/2022       Smoking Status Smoking Start Date Smoking Quit Date Packs/Day Years Used    Current Every Day Smoker -- 12/6/2020 1.00 37.00      Types Comments Smokeless Tobacco Status Smokeless Tobacco Quit Date Source     Cigarettes Enrolled in the [employer] on 5/3/14 ([email or web address] Member ID # [id number]). Ambulatory referral to Smoking Cessation Program Never Used -- Provider                  Alcohol Use as of 5/12/2022       Alcohol Use Drinks/Week Alcohol/Week Comments Source    No 0 Standard drinks or equivalent 0.0 standard drinks -- Provider                  Drug Use as of 5/12/2022       Drug Use Types Frequency Comments Source    No -- -- -- Provider                  Sexual Activity as of 5/12/2022       Sexually Active Birth Control Partners Comments Source    Yes -- Male -- Provider                  Activities of Daily Living as of 5/12/2022    None               Social Documentation as of 5/12/2022     from her  of 20 years  Lives by herself since 2019  Source: Provider               Occupational as of 5/12/2022    None               Socioeconomic as of 5/12/2022       Marital Status Spouse Name Number of Children Years Education Education Level Preferred Language Ethnicity Race Source    Significant Other -- -- -- -- English Not  or /a White Provider                  Pertinent History       Question Response Comments    Lives with -- --    Place in Birth Order --  "--    Lives in -- --    Number of Siblings -- --    Raised by -- --    Legal Involvement -- --    Childhood Trauma -- --    Criminal History of -- --    Financial Status -- --    Highest Level of Education -- --    Does patient have access to a firearm? -- --     Service -- --    Primary Leisure Activity -- --    Spirituality -- --          Past Medical History:   Diagnosis Date    ADHD (attention deficit hyperactivity disorder)     Arthritis     Asthma     Bipolar 1 disorder     Cataract     Cigarette smoker     COPD (chronic obstructive pulmonary disease)     Coronary artery disease     A fib    Depression     bipolar manic depresson    Diabetes mellitus     Diabetic foot ulcers     Diabetic neuropathy     DVT of lower extremity, bilateral 07/2013    bilateral LE DVT. [place] filter placed.     Encounter for blood transfusion     History of blood clots 1. Left Leg=2003; 2.Bilateral Groin=Blood Clots= 5 or 6/ 2013 & 7/2013; 3. LLL of Lung=7/2013;  4. Lt. Lower Leg=7/2013.     Pt. had 1st Blood Clot after Zxuwduudmrcp=5748, & Last=2013. [place] Filter= Rt.Lateral Neck.    HTN (hypertension) 06/06/2013    Pt states that she does not have hypertension    Hypercholesteremia     Irregular heartbeat     Neuromuscular disorder     neuropathy feet    Obese     PE (pulmonary embolism) 07/2013    bilat LE DVT.     Restless leg syndrome      Past Surgical History:   Procedure Laterality Date    ABDOMINAL SURGERY  2010    gastric sleeve    BILATERAL OOPHORECTOMY Bilateral 1/12/2015    CHOLECYSTECTOMY      DEBRIDEMENT OF FOOT Bilateral 5/10/2022    Procedure: DEBRIDEMENT, FOOT;  Surgeon: Maira De Los Santos DPM;  Location: [facility];  Service: Podiatry;  Laterality: Bilateral;    Green' s filter Right 7/4/2012    Right Neck & Tunneled Down.    HERNIA REPAIR      "[place] [facility] Hern[facility] around th [place].", pt. states    LAPAROSCOPIC CHOLECYSTECTOMY N/A 9/10/2020    Procedure: CHOLECYSTECTOMY, LAPAROSCOPIC;  " Surgeon: Montrell Gutierrez MD;  Location: [facility];  Service: General;  Laterality: N/A;  RN PREOP ----COVID Negative      OVARIAN CYST REMOVAL  3/13/2014    [place] REMOVAL OF OV[place]/TUBE(S)      SPLENECTOMY, TOTAL  2003    TONSILLECTOMY      as a child    TYMPANOSTOMY TUBE PLACEMENT  1976    VEIN SURGERY      Lt leg     Family History       Problem Relation (Age of Onset)    Cataracts Father    Diabetes Father, Paternal Grandfather    Heart disease Father, Paternal Grandfather    Hypertension Father    No Known Problems Mother, Sister, Brother, Maternal Aunt, Maternal Uncle, Paternal Aunt, Paternal Uncle, Maternal Grandfather    Ovarian cancer Maternal Grandmother, Paternal Grandmother          Tobacco Use    Smoking status: Current Every Day Smoker     Packs/day: 1.00     Years: 37.00     Pack years: 37.00     Types: Cigarettes     Last attempt to quit: 2020     Years since quittin.4    Smokeless tobacco: Never Used    Tobacco comment: Enrolled in the [employer] on 5/3/14 ([facility] Member ID # [id number]). Ambulatory referral to Smoking Cessation Program   Substance and Sexual Activity    Alcohol use: No     Alcohol/week: 0.0 standard drinks    Drug use: No    Sexual activity: Yes     Partners: Male     Review of patient's allergies indicates:   Allergen Reactions    Morphine Other (See Comments)     Patient had a psychotic episode after taking Morphine  Agitation, hallucinations    Penicillins Anaphylaxis     itching    Januvia [sitagliptin] Hives    Carbamazepine Other (See Comments)     hyponatremia       No current facility-administered medications on file prior to encounter.     Current Outpatient Medications on File Prior to Encounter   Medication Sig    acetaminophen (TYLENOL) 500 MG tablet Take 2 tablets (1,000 mg total) by mouth every 6 (six) hours as needed for Pain.    albuterol (PROVENTIL/VENTOLIN HFA) 90 mcg/actuation inhaler Inhale 2 puffs into the lungs every 6 (six) hours as needed  for Wheezing. Use with spacer  Dispense with 1 spacer    albuterol-ipratropium (DUO-NEB) 2.5 mg-0.5 mg/3 mL nebulizer solution Take 3 mLs by nebulization every 6 (six) hours as needed for Wheezing or Shortness of Breath. Rescue    apixaban (ELIQUIS) 5 mg Tab Take 1 tablet (5 mg total) by mouth 2 (two) times daily.    aspirin 81 MG Chew Take 1 tablet (81 mg total) by mouth once daily.    dicyclomine (BENTYL) 20 mg tablet Take 1 tablet (20 mg total) by mouth every 6 (six) hours.    divalproex (DEPAKOTE) 250 MG EC tablet Take 5 tablets (1,250 mg total) by mouth every evening.    divalproex (DEPAKOTE) 500 MG TbEC Take 1 tablet (500 mg total) by mouth once daily. PO QAM    DUPIXENT  mg/2 mL PnIj SMARTSI Milligram(s) SUB-Q Every 2 Weeks    EPITOL 200 mg tablet Take 200 mg by mouth 2 (two) times a day.    famotidine (PEPCID) 20 MG tablet Take 20 mg by mouth 2 (two) times daily.    fluticasone propionate (FLONASE) 50 mcg/actuation nasal spray 1 spray (50 mcg total) by Each Nostril route 2 (two) times daily.    furosemide (LASIX) 20 MG tablet TAKE 1 TABLET(20 MG) BY MOUTH EVERY DAY    gabapentin (NEURONTIN) 300 MG capsule TAKE 2 CAPSULES(600 MG) BY MOUTH TWICE DAILY    hydrOXYzine (ATARAX) 50 MG tablet Take 50 mg by mouth 4 (four) times daily as needed.    ibuprofen (ADVIL,MOTRIN) 600 MG tablet TAKE 1 TABLET(600 MG) BY MOUTH EVERY 8 HOURS AS NEEDED FOR PAIN OR BACK PAIN    lisinopriL 10 MG tablet Take 1 tablet (10 mg total) by mouth once daily.    loratadine (CLARITIN) 10 mg tablet Take 1 tablet (10 mg total) by mouth once daily.    magnesium oxide (MAG-OX) 400 mg (241.3 mg magnesium) tablet Take 1 tablet (400 mg total) by mouth 2 (two) times daily.    metFORMIN (GLUCOPHAGE) 1000 MG tablet TAKE 1 TABLET(1000 MG) BY MOUTH TWICE DAILY WITH MEALS    metoprolol tartrate (LOPRESSOR) 50 MG tablet TAKE 1 TABLET(50 MG) BY MOUTH TWICE DAILY    OLANZapine (ZYPREXA) 10 MG tablet Take 1 tablet (10 mg total) by mouth every 8  (eight) hours as needed (Agitation).    OPTICHAMBER BIGG LG MASK Spcr Inhale into the lungs.    pantoprazole (PROTONIX) 40 MG tablet Take 1 tablet (40 mg total) by mouth once daily.    polyvinyl alcohol, artificial tears, (LIQUIFILM TEARS) 1.4 % ophthalmic solution Place 1 drop into both eyes 4 (four) times daily.    pravastatin (PRAVACHOL) 40 MG tablet TAKE 1 TABLET(40 MG) BY MOUTH EVERY EVENING    risperiDONE (RISPERDAL M-TABS) 3 MG disintegrating tablet Take 1 tablet (3 mg total) by mouth 2 (two) times daily.    senna (SENOKOT) 8.6 mg tablet Take 1 tablet by mouth 2 (two) times a day.    blood sugar diagnostic Strp To check BG two  times daily, to use with insurance preferred meter (Patient taking differently: To check BG two  times daily, to use with insurance preferred meter)    blood-glucose meter kit To check BG once daily, to use with insurance preferred meter    blood-glucose meter kit To check BG two times daily, to use with insurance preferred meter    fluticasone-salmeterol diskus inhaler 250-50 mcg Inhale 1 puff into the lungs 2 (two) times daily. Controller    hydrOXYzine pamoate (VISTARIL) 50 MG Cap Take 1 capsule (50 mg total) by mouth 3 (three) times daily.    lancets Misc To check BG two times daily, to use with insurance preferred meter    multivitamin Tab Take 1 tablet by mouth once daily.    TRUE METRIX GLUCOSE METER Misc CHECK BLOOD SUGAR TWICE DAILY    [DISCONTINUED] diclofenac sodium (VOLTAREN) 1 % Gel Apply 2 g topically 4 (four) times daily as needed (Apply to painful area up to 4 times a day as needed for pain). Apply to painful area 4 times a day as needed for pain    [DISCONTINUED] nystatin (NYSTOP) powder APPLY TOPICALLY TO AXILLA AND BREAST FOLDS TWICE DAILY    [DISCONTINUED] QUEtiapine (SEROQUEL) 200 MG Tab Take 1 tablet (200 mg total) by mouth before breakfast.     Psychotherapeutics (From admission, onward)                Start     Stop Route Frequency Ordered    05/04/22 2100   "risperiDONE tablet 3 mg         -- Oral Nightly 05/04/22 1259    05/04/22 1400  risperiDONE tablet 2 mg         -- Oral Daily 05/04/22 1259    05/04/22 1325  OLANZapine tablet 10 mg         -- Oral Every 8 hours PRN 05/04/22 1259          Review of Systems  Strengths and Liabilities: Strength: Patient accepts guidance/feedback, Liability: Patient has poor health., Liability: Patient lacks coping skills.    Objective:     Vital Signs (Most Recent):  Temp: 98.4 °F (36.9 °C) (05/12/22 1154)  Pulse: 63 (05/12/22 1154)  Resp: 18 (05/12/22 1334)  BP: (!) 159/72 (05/12/22 1154)  SpO2: (!) 93 % (05/12/22 1154) Vital Signs (24h Range):  Temp:  [98.4 °F (36.9 °C)-99.5 °F (37.5 °C)] 98.4 °F (36.9 °C)  Pulse:  [63-90] 63  Resp:  [18-20] 18  SpO2:  [91 %-96 %] 93 %  BP: (141-169)/(63-72) 159/72     Height: 5' 6" (167.6 cm)  Weight: 126.4 kg (278 lb 10.6 oz)  Body mass index is 44.98 kg/m².      Intake/Output Summary (Last 24 hours) at 5/12/2022 1453  Last data filed at 5/12/2022 1400  Gross per 24 hour   Intake 780 ml   Output 1200 ml   Net -420 ml         Physical Exam  Psychiatric:         Attention and Perception: Attention and perception normal.         Mood and Affect: Mood is not anxious or depressed.         Speech: Speech is not rapid and pressured or tangential.         Behavior: Behavior is not agitated, slowed, aggressive, withdrawn, hyperactive or combative. Behavior is cooperative.         Thought Content: Thought content is paranoid.         Cognition and Memory: Cognition and memory normal.         Judgment: Judgment is not impulsive or inappropriate.        Significant Labs: All pertinent labs within the past 24 hours have been reviewed.    Significant Imaging: I have reviewed all pertinent imaging results/findings within the past 24 hours.  "
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
## 2022-05-12 NOTE — PROGRESS NOTES
[facility]  Podiatry  Progress Note    Patient Name: Audrey Natarajan  MRN: [id number]  Admission Date: 5/4/2022  Hospital Length of Stay: 8 days  Attending Physician: Scott Fuller MD  Primary Care Provider: Donaldo Pena MD     Subjective:     History of Present Illness: 50 y/o female PMH DM2, bipolar admitted for wound infection B/L. Patient recently discharged from [facility] on 4/26/22. Patient reports unable to take medications as family member was hiding meds from her. Seen earlier today in podiatry clinic Dr. De Los Santos, sent to ED for admission.     5/6/22: Patient seen bedside. Bandages intact B/L    5/8/2022 patient seen at bedside resting comfortably.  Dressing somewhat disheveled because she relates that she walks on floor without any type of shoe.  She relates feeling overall better.  No new pedal complaints.    5/9/22: Patient seen bedside. Per nurse patient declined heel protector boots. Only has one DARCO shoe at bedside. Contacted nurse to order additional DARCO shoe.     5/10/2022 Patient seen bedside. Resting comfortably. No new pedal complaints    5/11/22: S/p one day B/L foot debridement. Bandages intact B/L     Scheduled Meds:   apixaban  5 mg Oral BID    aspirin  81 mg Oral Daily    cetirizine  5 mg Oral Daily    divalproex  1,250 mg Oral QHS    divalproex  500 mg Oral Daily    fludrocortisone  100 mcg Oral Daily    fluticasone furoate-vilanteroL  1 puff Inhalation Daily    fluticasone propionate  2 spray Each Nostril Daily    hydrOXYzine pamoate  50 mg Oral TID    metoprolol tartrate  50 mg Oral BID    pantoprazole  40 mg Oral Daily    polyethylene glycol  17 g Oral Daily    pravastatin  40 mg Oral QHS    risperiDONE  2 mg Oral Daily    risperiDONE  3 mg Oral QHS    senna-docusate 8.6-50 mg  2 tablet Oral BID    sodium zirconium cyclosilicate  5 g Oral Daily    vancomycin (VANCOCIN) IVPB  1,500 mg Intravenous Q12H     Continuous Infusions:    PRN Meds:sodium  chloride, acetaminophen, dextrose 10%, dextrose 10%, glucagon (human recombinant), glucagon (human recombinant), glucose, glucose, insulin aspart U-100, naloxone, naloxone, OLANZapine, oxyCODONE-acetaminophen, sodium chloride 0.9%, sodium chloride 0.9%, Pharmacy to dose Vancomycin consult **AND** vancomycin - pharmacy to dose    Review of patient's allergies indicates:   Allergen Reactions    Morphine Other (See Comments)     Patient had a psychotic episode after taking Morphine  Agitation, hallucinations    Penicillins Anaphylaxis     itching    Januvia [sitagliptin] Hives    Carbamazepine Other (See Comments)     hyponatremia        Past Medical History:   Diagnosis Date    ADHD (attention deficit hyperactivity disorder)     Arthritis     Asthma     Bipolar 1 disorder     Cataract     Cigarette smoker     COPD (chronic obstructive pulmonary disease)     Coronary artery disease     A fib    Depression     bipolar manic depresson    Diabetes mellitus     Diabetic foot ulcers     Diabetic neuropathy     DVT of lower extremity, bilateral 07/2013    bilateral LE DVT. Estelita filter placed.     Encounter for blood transfusion     History of blood clots 1. Left Leg=2003; 2.Bilateral Groin=Blood Clots= 5 or 6/ 2013 & 7/2013; 3. LLL of Lung=7/2013;  4. Lt. Lower Leg=7/2013.     Pt. had 1st Blood Clot after Sitxvipztwwn=6855, & Last=2013. [place] Filter= Rt.Lateral Neck.    HTN (hypertension) 06/06/2013    Pt states that she does not have hypertension    Hypercholesteremia     Irregular heartbeat     Neuromuscular disorder     neuropathy feet    Obese     PE (pulmonary embolism) 07/2013    bilat LE DVT.     Restless leg syndrome      Past Surgical History:   Procedure Laterality Date    ABDOMINAL SURGERY  2010    gastric sleeve    BILATERAL OOPHORECTOMY Bilateral 1/12/2015    CHOLECYSTECTOMY      DEBRIDEMENT OF FOOT Bilateral 5/10/2022    Procedure: DEBRIDEMENT, FOOT;  Surgeon: Maira GONSALVES  "ANA MARIA De Los Santos;  Location: [facility] OR;  Service: Podiatry;  Laterality: Bilateral;    Green' s filter Right 2012    Right Neck & Tunneled Down.    HERNIA REPAIR      "[place] [employer] around th [place].", pt. states    LAPAROSCOPIC CHOLECYSTECTOMY N/A 9/10/2020    Procedure: CHOLECYSTECTOMY, LAPAROSCOPIC;  Surgeon: Montrell Gutierrez MD;  Location: [facility] OR;  Service: General;  Laterality: N/A;  RN PREOP ----COVID Negative      OVARIAN CYST REMOVAL  3/13/2014    TN REMOVAL OF OVARY/TUBE(S)      SPLENECTOMY, TOTAL  2003    TONSILLECTOMY      as a child    TYMPANOSTOMY TUBE PLACEMENT      VEIN SURGERY      Lt leg       Family History     Problem Relation (Age of Onset)    Cataracts Father    Diabetes Father, Paternal Grandfather    Heart disease Father, Paternal Grandfather    Hypertension Father    No Known Problems Mother, Sister, Brother, Maternal Aunt, Maternal Uncle, Paternal Aunt, Paternal Uncle, Maternal Grandfather    Ovarian cancer Maternal Grandmother, Paternal Grandmother        Tobacco Use    Smoking status: Current Every Day Smoker     Packs/day: 1.00     Years: 37.00     Pack years: 37.00     Types: Cigarettes     Last attempt to quit: 2020     Years since quittin.4    Smokeless tobacco: Never Used    Tobacco comment: Enrolled in the [employer] on 5/3/14 ([employer] Member ID # [id number]). Ambulatory referral to Smoking Cessation Program   Substance and Sexual Activity    Alcohol use: No     Alcohol/week: 0.0 standard drinks    Drug use: No    Sexual activity: Yes     Partners: Male     Review of Systems   Constitutional: Negative for activity change, appetite change, chills, fatigue and fever.   Respiratory: Negative for cough and shortness of breath.    Cardiovascular: Positive for leg swelling. Negative for chest pain.   Gastrointestinal: Negative for diarrhea, nausea and vomiting.   Musculoskeletal: Positive for arthralgias and myalgias.   Skin: " Positive for wound.   Neurological: Positive for numbness. Negative for weakness.        + paresthesia    Psychiatric/Behavioral: The patient is nervous/anxious.      Objective:     Vital Signs (Most Recent):  Temp: 99.2 °F (37.3 °C) (05/11/22 2316)  Pulse: 71 (05/11/22 2316)  Resp: 18 (05/12/22 0257)  BP: (!) 147/66 (05/11/22 2316)  SpO2: (!) 91 % (05/11/22 2316) Vital Signs (24h Range):  Temp:  [98.7 °F (37.1 °C)-99.5 °F (37.5 °C)] 99.2 °F (37.3 °C)  Pulse:  [62-77] 71  Resp:  [17-20] 18  SpO2:  [91 %-98 %] 91 %  BP: (138-159)/(61-72) 147/66     Weight: 126.4 kg (278 lb 10.6 oz)  Body mass index is 44.98 kg/m².    Foot Exam    General  Orientation: alert and oriented to person, place, and time       Right Foot/Ankle     Neurovascular  Dorsalis pedis: 1+  Posterior tibial: 1+  Saphenous nerve sensation: diminished  Tibial nerve sensation: diminished  Superficial peroneal nerve sensation: diminished  Deep peroneal nerve sensation: diminished  Sural nerve sensation: diminished      Left Foot/Ankle      Neurovascular  Dorsalis pedis: 1+  Posterior tibial: 1+  Saphenous nerve sensation: diminished  Tibial nerve sensation: diminished  Superficial peroneal nerve sensation: diminished  Deep peroneal nerve sensation: diminished  Sural nerve sensation: diminished        5/11/22:            Suture intact skin edges well coapted left plantar 2nd toe.         5/9/22:                05/08/2022 5/6/22:     Wound 1: Left plantar foot   Measurement: 0dbg3jyc0.3cm  pre debridement 5.5cmx5.5cmx0.4cm post debridement.  Base: fibrogranular base   Periwound skin: HPK, maceration   Drainage: serous   Erythema: mild  Probe: deep probe         Pre debridement       Post debridement             5/4/22:    Right foot- Epic unable to load image  Right plantar hallux- probe to periosteum fibrotic base  Right plantar forefoot - wound with fibrotic base.   Left foot  Left plantar foot ulceration with necrotic base deep probe  to bone        Laboratory:  CBC:   Recent Labs   Lab 05/11/22 0420   WBC 15.68*   RBC 2.72*   HGB 7.4*   HCT 23.4*   *   MCV 86   MCH 27.2   MCHC 31.6*     CMP:   Recent Labs   Lab 05/10/22  0458 05/11/22  0420   * 170*   CALCIUM 8.5* 8.5*   ALBUMIN 2.3*  --    PROT 6.3  --    * 135*   K 5.6* 5.4*   CO2 23 24    103   BUN 11 10   CREATININE 0.8 0.7   ALKPHOS 96  --    ALT 11  --    AST 7*  --    BILITOT 0.2  --        Diagnostic Results:  Xray:  X-Ray Foot Complete Right  Order: [id number]   Status: Final result     Visible to patient: Yes (not seen)     Next appt: 05/09/2022 at 02:00 PM in Gastroenterology (Saloni De Anda MD)     Dx: Infection     0 Result Notes    Details    Reading Physician Reading Date Result Priority   Josr Almanzar MD  [phone]  [phone] 5/4/2022 STAT     Narrative & Impression  EXAMINATION:  XR FOOT COMPLETE 3 VIEW RIGHT     CLINICAL HISTORY:  . Unspecified infectious disease     TECHNIQUE:  AP, lateral, and oblique views of the right foot were performed.     COMPARISON:  Right foot radiograph 04/20/2022.     FINDINGS:  No definite evidence of acute fracture or dislocation.  Lisfranc joint appears congruent.  There appears to be chronic appearing deformity at the 4th digit metatarsal head and neck with erosive change at the lateral aspect of the head.     Joint spaces appear to be maintained.  There is soft tissue swelling most pronounced at the dorsum of the mid and forefoot.  No definite radiopaque foreign body.  Enthesopathic change at the calcaneus.     Impression:     No definite evidence of acute fracture or dislocation.     MRI: MRI Foot (Forefoot) Right Without Contrast  Order: [id number]   Status: Final result     Visible to patient: Yes (not seen)     Next appt: 05/09/2022 at 02:00 PM in Gastroenterology (Saloni De Anda MD)     Dx: Other acute osteomyelitis, unspecifie...     0 Result Notes    Details    Reading Physician Reading Date Result  Priority   Tu Santos Jr., MD  [phone]  [phone] 5/6/2022 Routine     Narrative & Impression  EXAMINATION:  MRI FOOT (FOREFOOT) RIGHT WITHOUT CONTRAST     CLINICAL HISTORY:  Osteomyelitis, foot;eval OM, abscess right  plantar forefoot ulceration right hallux;  Other acute osteomyelitis, unspecified ankle and foot     TECHNIQUE:  Noncontrast MRI of the right forefoot     COMPARISON:  X-ray 05/04/2022     FINDINGS:  There are hammertoe deformities of digits 2 through 4.  There is a fracture of the 4th metatarsal head without surrounding marrow edema but with visualization of the fracture line suggesting that it may be subacute to chronic.     There is soft tissue ulceration and skin thickening of the distal aspect of the great toe with subtle high T2 and low T1 signal involving the distal tuft compatible with cellulitis and osteomyelitis.  No focal soft tissue fluid collection.  Diffuse dorsal forefoot soft tissue edema is present and there are chronic denervation changes throughout the muscles.     Impression:     Cellulitis of the forefoot and great toe with osteomyelitis of the distal tuft of the 1st digit     Fracture of the 4th metatarsal neck, possibly subacute to chronic.           MRI: MRI Foot (Midfoot) Left Without Contrast  Order: [id number]   Status: Final result     Visible to patient: Yes (not seen)     Next appt: 05/09/2022 at 02:00 PM in Gastroenterology (Saloni De Anda MD)     Dx: Other acute osteomyelitis, unspecifie...     0 Result Notes    Details    Reading Physician Reading Date Result Priority   Tu Santos Jr., MD  [phone]  [phone] 5/6/2022 Routine     Narrative & Impression  EXAMINATION:  MRI FOOT (MIDFOOT) LEFT WITHOUT CONTRAST     CLINICAL HISTORY:  Osteomyelitis, foot;R/o abscess, OM left plantar foot ulceration;  Other acute osteomyelitis, unspecified ankle and foot     TECHNIQUE:  Multiplanar multisequence MRI of the left midfoot without intravenous  contrast.     COMPARISON:  X-ray 04/20/2022     FINDINGS:  Diffuse advanced destructive arthropathic changes are seen throughout the midfoot with abnormal flatfoot deformity.  There is marked skin thickening and a large area of soft tissue ulceration at the plantar aspect of the midfoot without localized fluid collection or evidence of a sinus tract extending to the bone.  Findings are associated with disruption the central aspect of the plantar aponeurosis and diffuse high signal within the plantar muscles of the midfoot.  No definite fatty atrophy.     Diffuse forefoot and midfoot soft tissue edema is     Impression:     Midfoot and forefoot cellulitis and plantar soft tissue phlegmon.     Destructive arthropathy of the midfoot most likely related to Charcot changes with superimposed septic arthritis difficult to exclude with certainty given the proximity to the foot ulcer and adjacent inflammatory change.     No drainable abscess.          Arterial US:  Contains abnormal data US Lower Extremity Arteries Bilateral  Order: [id number]   Status: Final result     Visible to patient: Yes (not seen)     Next appt: 05/09/2022 at 02:00 PM in Gastroenterology (Saloni De Anda MD)     Dx: Diabetic ulcer of other part of foot ...     0 Result Notes    Details    Reading Physician Reading Date Result Priority   Samuel Harris MD  [phone]  [phone] 5/6/2022 Routine     Narrative & Impression  EXAMINATION:  US LOWER EXTREMITY ARTERIES BILATERAL     CLINICAL HISTORY:  PAD eval;     TECHNIQUE:  Bilateral lower extremity arterial duplex ultrasound examination performed. Multiple gray scale and color doppler images were obtained in addition to waveform analysis.     COMPARISON:  Left lower extremity arterial Doppler 07/13/2021     FINDINGS:  Right lower extremity     Common femoral artery: 111-cm/sec; triphasic waveforms     Deep femoral artery, proximal: 70-cm/sec; triphasic waveforms     Superficial femoral artery,  proximal: 100-cm/sec; triphasic waveforms     Superficial femoral artery, mid portion: 135-cm/sec; triphasic waveforms     Superficial femoral artery, distal: 137-cm/sec; triphasic waveforms     Popliteal artery, proximal: 83-cm/sec; triphasic waveforms     Popliteal artery, distal: 125-cm/sec; triphasic waveforms     Anterior tibial artery: 121-cm/sec; triphasic waveforms     Posterior tibial artery: 48-cm/sec; triphasic waveforms     Peroneal artery: 48-cm/sec; triphasic waveforms     Dorsalis pedis artery: 133-cm/sec; triphasic waveforms     Left lower extremity     Common femoral artery: 120-cm/sec; triphasic waveforms     Deep femoral artery, proximal: 84-cm/sec; triphasic waveforms     Superficial femoral artery, proximal: 150-cm/sec; triphasic waveforms     Superficial femoral artery, mid portion: 189-cm/sec; triphasic waveforms     Superficial femoral artery, distal: 157-cm/sec; triphasic waveforms     Popliteal artery, proximal: 114-cm/sec; triphasic waveforms     Popliteal artery, distal: 139-cm/sec; triphasic waveforms     Anterior tibial artery: 58-cm/sec; triphasic waveforms     Posterior tibial artery: 61-cm/sec; triphasic waveforms     Peroneal artery: 48-cm/sec; triphasic waveforms     Dorsalis pedis artery: 177-cm/sec; triphasic waveforms     Impression:     1. Sonogram suggest hemodynamically significant stenosis in the left and DPA.  2. Multifocal mild to moderate grade stenoses is suspected throughout the left MIRACLE and, bilateral posterior tibial and peroneal arteries.  This report was flagged in Epic as abnormal.             Clinical Findings:  B/L ulceration probe to periosteum right hallux     Assessment/Plan:     Active Diagnoses:    Diagnosis Date Noted POA    PRINCIPAL PROBLEM:  Sepsis due to methicillin resistant Staphylococcus aureus (MRSA) [A41.02] 04/18/2022 Yes    Constipation [K59.00] 05/08/2022 Yes    Iron deficiency anemia [D50.9] 05/06/2022 Yes    Cellulitis of both feet  [L03.115, L03.116] 05/06/2022 Yes    PAD (peripheral artery disease) [I73.9] 05/06/2022 Yes    Bacteremia due to Staphylococcus [R78.81, B95.8]  Yes    Osteomyelitis of right foot [M86.9]  Yes    Hyponatremia [E87.1] 05/04/2022 Yes    Hyperkalemia [E87.5] 04/21/2022 Yes    Chronic deep vein thrombosis (DVT) of both lower extremities [I82.503] 04/13/2022 Yes    Diabetic foot ulcer associated with type 2 diabetes mellitus [E11.621, L97.509] 04/13/2022 Yes    Long term (current) use of anticoagulants [Z79.01] 09/03/2019 Not Applicable    Severe obesity (BMI >= 40) [E66.01] 08/10/2016 Yes    Thrombocytosis [D75.839] 07/16/2016 Yes    Bipolar 1 disorder [F31.9] 04/13/2015 Yes     Chronic    Diabetic neuropathy [E11.40] 11/28/2014 Yes    COPD (chronic obstructive pulmonary disease) [J44.9] 10/11/2013 Yes     Chronic    Essential hypertension [I10] 06/06/2013 Yes     Chronic      Problems Resolved During this Admission:    Diagnosis Date Noted Date Resolved POA    Chest pain [R07.9]  05/06/2022 Yes    Infection [B99.9]  05/06/2022 Yes    Wound infection [T14.8XXA, L08.9]  05/06/2022 Yes    Anemia [D64.9] 04/13/2022 05/06/2022 Yes    Cellulitis of lower extremity [L03.119] 02/10/2021 05/06/2022 Yes    Hypercoagulable state [D68.59] 09/25/2019 05/06/2022 Yes    Leukocytosis [D72.829] 07/16/2016 05/06/2022 Yes     S/p one day B/L debridement . Surgical sites stable. DSD applied.       Discussed two options with patient. Amputation of  Right hallux, L BKA   vs IV abx for six weeks with aggressive wound care for several months with no guarantee of wound resolution.  Patient declines amputation of right hallux, also declines L BKA. Elects for IV abx.     Abx per ID    Soft tissue swabs with polymicrobial infection    Vascular surgery communicated it is ok to proceed with debridement    Patient declines amputation    Ok for discharge from podiatry standpoint.       Podiatry will continue to follow.    Halle  WAI Gill DPM  Podiatry  [facility] - Telemetry

## 2022-05-12 NOTE — PLAN OF CARE
Problem: Adult Inpatient Plan of Care  Goal: Plan of Care Review  Outcome: Ongoing, Progressing  Goal: Patient-Specific Goal (Individualized)  Outcome: Ongoing, Progressing  Goal: Absence of Hospital-Acquired Illness or Injury  Outcome: Ongoing, Progressing  Goal: Optimal Comfort and Wellbeing  Outcome: Ongoing, Progressing  Goal: Readiness for Transition of Care  Outcome: Ongoing, Progressing     Problem: Skin Injury Risk Increased  Goal: Skin Health and Integrity  Outcome: Ongoing, Progressing     Problem: Fall Injury Risk  Goal: Absence of Fall and Fall-Related Injury  Outcome: Ongoing, Progressing     Problem: Fluid and Electrolyte Imbalance (Acute Kidney Injury/Impairment)  Goal: Fluid and Electrolyte Balance  Outcome: Ongoing, Progressing     Problem: Oral Intake Inadequate (Acute Kidney Injury/Impairment)  Goal: Optimal Nutrition Intake  Outcome: Ongoing, Progressing     Problem: Renal Function Impairment (Acute Kidney Injury/Impairment)  Goal: Effective Renal Function  Outcome: Ongoing, Progressing     Problem: Impaired Wound Healing  Goal: Optimal Wound Healing  Outcome: Ongoing, Progressing     Problem: Bariatric Environmental Safety  Goal: Safety Maintained with Care  Outcome: Ongoing, Progressing     Problem: Infection  Goal: Absence of Infection Signs and Symptoms  Outcome: Ongoing, Progressing

## 2022-05-12 NOTE — PLAN OF CARE
Pt is AAOx4. Room air. BG WNL.  No falls or new injuries reported during shift, safety precautions maintained.     Problem: Adult Inpatient Plan of Care  Goal: Plan of Care Review  Outcome: Ongoing, Progressing     Problem: Adult Inpatient Plan of Care  Goal: Optimal Comfort and Wellbeing  Outcome: Ongoing, Progressing

## 2022-05-12 NOTE — PROCEDURES
"Audrey Natarajan is a 51 y.o. female patient.    Temp: 98.7 °F (37.1 °C) (05/12/22 1600)  Pulse: 65 (05/12/22 1600)  Resp: 20 (05/12/22 1600)  BP: (!) 160/72 (05/12/22 1600)  SpO2: (!) 92 % (05/12/22 1600)  Weight: 126.4 kg (278 lb 10.6 oz) (05/10/22 0434)  Height: 5' 6" (167.6 cm) (05/04/22 0914)    PICC  Date/Time: 5/12/2022 5:08 PM  Performed by: Israel Ritter RN  Consent Done: Yes  Time out: Immediately prior to procedure a time out was called to verify the correct patient, procedure, equipment, support staff and site/side marked as required  Indications: med administration and vascular access  Anesthesia: local infiltration  Local anesthetic: lidocaine 1% without epinephrine  Anesthetic Total (mL): 5  Preparation: skin prepped with ChloraPrep  Skin prep agent dried: skin prep agent completely dried prior to procedure  Sterile barriers: all five maximum sterile barriers used - cap, mask, sterile gown, sterile gloves, and large sterile sheet  Hand hygiene: hand hygiene performed prior to central venous catheter insertion  Location details: right basilic  Catheter type: double lumen  Catheter size: 5 Fr  Catheter Length: 36cm    Ultrasound guidance: yes  Vessel Caliber: medium, compressibility normal  Needle advanced into vessel with real time Ultrasound guidance.  Guidewire confirmed in vessel.  Sterile sheath used.  Number of attempts: 1  Post-procedure: blood return through all ports, chlorhexidine patch and sterile dressing applied            Name israel ritter  5/12/2022  "

## 2022-05-12 NOTE — ASSESSMENT & PLAN NOTE
"This patient does have evidence of infective focus- bilateral DM foot wounds and bacteremia  My overall impression is sepsis. Vital signs were reviewed and noted in progress note.  Antibiotics given-   Antibiotics (From admission, onward)            Start     Stop Route Frequency Ordered    05/09/22 1030  vancomycin 1.5 g in dextrose 5 % 250 mL IVPB (ready to mix)         -- IV Every 12 hours (non-standard times) 05/09/22 0345    05/04/22 1245  mupirocin 2 % ointment         05/09 0859 Nasl 2 times daily 05/04/22 1135    05/04/22 1103  vancomycin - pharmacy to dose  (vancomycin IVPB)        "And" Linked Group Details    -- IV pharmacy to manage frequency 05/04/22 1004        Cultures were taken-   Microbiology Results (last 7 days)     Procedure Component Value Units Date/Time    Blood culture [[id number]] Collected: 05/06/22 1155    Order Status: Completed Specimen: Blood from Antecubital, Right Hand Updated: 05/10/22 1303     Blood Culture, Routine No Growth after 4 days.     Blood culture [[id number]] Collected: 05/06/22 1155    Order Status: Completed Specimen: Blood from Peripheral, Left Hand Updated: 05/10/22 1303     Blood Culture, Routine No Growth after 4 days.     Aerobic culture [[id number]] Collected: 05/06/22 1249    Order Status: Completed Specimen: Bone from Toe, Right Foot Updated: 05/10/22 0734     Aerobic Bacterial Culture No growth    AFB Culture & Smear [[id number]] Collected: 05/06/22 1249    Order Status: Completed Specimen: Bone from Toe, Right Foot Updated: 05/09/22 1429     AFB Culture & Smear Culture in progress     AFB CULTURE STAIN No acid fast bacilli seen.    Blood culture x two cultures. Draw prior to antibiotics. [[id number]] Collected: 05/04/22 0947    Order Status: Completed Specimen: Blood from Peripheral, Antecubital, Left Updated: 05/08/22 1103     Blood Culture, Routine No Growth after 4 days.     Narrative:      Aerobic and anaerobic    Blood culture x two cultures. Draw prior to " antibiotics. [[id number]]  (Abnormal)  (Susceptibility) Collected: 05/04/22 0944    Order Status: Completed Specimen: Blood from Peripheral, Antecubital, Right Updated: 05/07/22 0809     Blood Culture, Routine Gram stain abbe bottle: Gram positive cocci in clusters resembling Staph       Results called to and read back by: Linnette KRAMER 05/05/2022        09:58      METHICILLIN RESISTANT STAPHYLOCOCCUS AUREUS    Narrative:      Aerobic and anaerobic    Gram stain [[id number]] Collected: 05/06/22 1249    Order Status: Completed Specimen: Bone from Toe, Right Foot Updated: 05/06/22 1446     Gram Stain Result No organisms seen    Fungus culture [[id number]] Collected: 05/06/22 1249    Order Status: Sent Specimen: Bone from Toe, Right Foot Updated: 05/06/22 1302    Aerobic culture [[id number]]  (Abnormal)  (Susceptibility) Collected: 05/04/22 1500    Order Status: Completed Specimen: Wound from Foot, Right Updated: 05/06/22 1126     Aerobic Bacterial Culture Results called to and read back by: Linnette Alvarado 05/06/2022  08:40      ESCHERICHIA COLI  Many        ENTEROBACTER CLOACAE  Moderate        METHICILLIN RESISTANT STAPHYLOCOCCUS AUREUS  Many      Aerobic culture [[id number]]  (Abnormal)  (Susceptibility) Collected: 05/04/22 1500    Order Status: Completed Specimen: Wound from Foot, Left Updated: 05/06/22 0844     Aerobic Bacterial Culture Results called to and read back by: Linnette Alvarado 05/06/2022  08:40      METHICILLIN RESISTANT STAPHYLOCOCCUS AUREUS  Many          Latest lactate reviewed, they are-  No results for input(s): LACTATE in the last 72 hours.  Staph bacteremia- repeat blood cultures. TTE no vegetation.    MRI L foot: mid/forefoot cellulitis, Charcot changes cannot rule out septic arthritis  MRI R foot: cellulitis forefoot and great toe. Osteomyelitis of 1st digit    Podiatry following. Declines amputation. Plan for further operative debridement- anticipate sometime this week.   Vascular  consulted for abnormal arterial US- No intervention at this time, recommending outpatient follow up or to be seen sooner if wounds are not healing  ID consulted  - Plan for 6 weeks IV Vanc, end date 6/14/2022

## 2022-05-13 VITALS
DIASTOLIC BLOOD PRESSURE: 74 MMHG | RESPIRATION RATE: 20 BRPM | OXYGEN SATURATION: 95 % | TEMPERATURE: 98 F | WEIGHT: 278.69 LBS | BODY MASS INDEX: 44.79 KG/M2 | HEIGHT: 66 IN | HEART RATE: 81 BPM | SYSTOLIC BLOOD PRESSURE: 170 MMHG

## 2022-05-13 LAB
BASOPHILS # BLD AUTO: 0.06 K/UL (ref 0–0.2)
BASOPHILS NFR BLD: 0.6 % (ref 0–1.9)
DIFFERENTIAL METHOD: ABNORMAL
EOSINOPHIL # BLD AUTO: 0.4 K/UL (ref 0–0.5)
EOSINOPHIL NFR BLD: 3.3 % (ref 0–8)
ERYTHROCYTE [DISTWIDTH] IN BLOOD BY AUTOMATED COUNT: 18.1 % (ref 11.5–14.5)
HCT VFR BLD AUTO: 23 % (ref 37–48.5)
HGB BLD-MCNC: 7.3 G/DL (ref 12–16)
IMM GRANULOCYTES # BLD AUTO: 0.09 K/UL (ref 0–0.04)
IMM GRANULOCYTES NFR BLD AUTO: 0.8 % (ref 0–0.5)
LEFT TBI: 0.65
LEFT TOE PRESSURE: 87 MMHG
LYMPHOCYTES # BLD AUTO: 3.3 K/UL (ref 1–4.8)
LYMPHOCYTES NFR BLD: 30.9 % (ref 18–48)
MAGNESIUM SERPL-MCNC: 1.6 MG/DL (ref 1.6–2.6)
MCH RBC QN AUTO: 27.1 PG (ref 27–31)
MCHC RBC AUTO-ENTMCNC: 31.7 G/DL (ref 32–36)
MCV RBC AUTO: 86 FL (ref 82–98)
MONOCYTES # BLD AUTO: 1.5 K/UL (ref 0.3–1)
MONOCYTES NFR BLD: 13.8 % (ref 4–15)
NEUTROPHILS # BLD AUTO: 5.5 K/UL (ref 1.8–7.7)
NEUTROPHILS NFR BLD: 50.6 % (ref 38–73)
NRBC BLD-RTO: 0 /100 WBC
PLATELET # BLD AUTO: 767 K/UL (ref 150–450)
PMV BLD AUTO: 9.1 FL (ref 9.2–12.9)
POCT GLUCOSE: 135 MG/DL (ref 70–110)
POCT GLUCOSE: 180 MG/DL (ref 70–110)
POCT GLUCOSE: 219 MG/DL (ref 70–110)
RBC # BLD AUTO: 2.69 M/UL (ref 4–5.4)
RIGHT ARM BP: 133 MMHG
RIGHT TBI: 0.89
RIGHT TOE PRESSURE: 119 MMHG
WBC # BLD AUTO: 10.76 K/UL (ref 3.9–12.7)

## 2022-05-13 PROCEDURE — 25000003 PHARM REV CODE 250: Performed by: PODIATRIST

## 2022-05-13 PROCEDURE — 83735 ASSAY OF MAGNESIUM: CPT | Performed by: PODIATRIST

## 2022-05-13 PROCEDURE — 94640 AIRWAY INHALATION TREATMENT: CPT

## 2022-05-13 PROCEDURE — A4216 STERILE WATER/SALINE, 10 ML: HCPCS | Performed by: STUDENT IN AN ORGANIZED HEALTH CARE EDUCATION/TRAINING PROGRAM

## 2022-05-13 PROCEDURE — 25000003 PHARM REV CODE 250: Performed by: STUDENT IN AN ORGANIZED HEALTH CARE EDUCATION/TRAINING PROGRAM

## 2022-05-13 PROCEDURE — 25000003 PHARM REV CODE 250: Performed by: HOSPITALIST

## 2022-05-13 PROCEDURE — 85025 COMPLETE CBC W/AUTO DIFF WBC: CPT | Performed by: PODIATRIST

## 2022-05-13 PROCEDURE — 63600175 PHARM REV CODE 636 W HCPCS: Performed by: PODIATRIST

## 2022-05-13 RX ORDER — OXYCODONE AND ACETAMINOPHEN 7.5; 325 MG/1; MG/1
1 TABLET ORAL EVERY 4 HOURS PRN
Qty: 10 TABLET | Refills: 0 | Status: SHIPPED | OUTPATIENT
Start: 2022-05-13 | End: 2022-07-19 | Stop reason: ALTCHOICE

## 2022-05-13 RX ADMIN — Medication 10 ML: at 05:05

## 2022-05-13 RX ADMIN — OXYCODONE AND ACETAMINOPHEN 1 TABLET: 7.5; 325 TABLET ORAL at 08:05

## 2022-05-13 RX ADMIN — Medication 10 ML: at 02:05

## 2022-05-13 RX ADMIN — Medication 10 ML: at 12:05

## 2022-05-13 RX ADMIN — CETIRIZINE HYDROCHLORIDE 5 MG: 5 TABLET ORAL at 08:05

## 2022-05-13 RX ADMIN — PANTOPRAZOLE SODIUM 40 MG: 40 TABLET, DELAYED RELEASE ORAL at 08:05

## 2022-05-13 RX ADMIN — OXYCODONE AND ACETAMINOPHEN 1 TABLET: 7.5; 325 TABLET ORAL at 02:05

## 2022-05-13 RX ADMIN — ACETAMINOPHEN 1000 MG: 500 TABLET ORAL at 12:05

## 2022-05-13 RX ADMIN — Medication 10 ML: at 06:05

## 2022-05-13 RX ADMIN — HYDROXYZINE PAMOATE 50 MG: 25 CAPSULE ORAL at 02:05

## 2022-05-13 RX ADMIN — HYDROXYZINE PAMOATE 50 MG: 25 CAPSULE ORAL at 08:05

## 2022-05-13 RX ADMIN — VANCOMYCIN HYDROCHLORIDE 1500 MG: 1.5 INJECTION, POWDER, LYOPHILIZED, FOR SOLUTION INTRAVENOUS at 11:05

## 2022-05-13 RX ADMIN — ASPIRIN 81 MG CHEWABLE TABLET 81 MG: 81 TABLET CHEWABLE at 08:05

## 2022-05-13 RX ADMIN — ACETAMINOPHEN 1000 MG: 500 TABLET ORAL at 06:05

## 2022-05-13 RX ADMIN — RISPERIDONE 2 MG: 1 TABLET ORAL at 09:05

## 2022-05-13 RX ADMIN — FLUTICASONE FUROATE AND VILANTEROL TRIFENATATE 1 PUFF: 100; 25 POWDER RESPIRATORY (INHALATION) at 07:05

## 2022-05-13 RX ADMIN — OXYCODONE AND ACETAMINOPHEN 1 TABLET: 7.5; 325 TABLET ORAL at 04:05

## 2022-05-13 RX ADMIN — APIXABAN 5 MG: 5 TABLET, FILM COATED ORAL at 08:05

## 2022-05-13 RX ADMIN — DIVALPROEX SODIUM 500 MG: 250 TABLET, DELAYED RELEASE ORAL at 08:05

## 2022-05-13 RX ADMIN — METOPROLOL TARTRATE 50 MG: 50 TABLET, FILM COATED ORAL at 08:05

## 2022-05-13 RX ADMIN — FLUTICASONE PROPIONATE 100 MCG: 50 SPRAY, METERED NASAL at 09:05

## 2022-05-13 NOTE — PLAN OF CARE
Problem: Adult Inpatient Plan of Care  Goal: Plan of Care Review  Outcome: Met  Goal: Patient-Specific Goal (Individualized)  Outcome: Met  Goal: Absence of Hospital-Acquired Illness or Injury  Outcome: Met  Goal: Optimal Comfort and Wellbeing  Outcome: Met  Goal: Readiness for Transition of Care  Outcome: Met     Problem: Skin Injury Risk Increased  Goal: Skin Health and Integrity  Outcome: Met     Problem: Fall Injury Risk  Goal: Absence of Fall and Fall-Related Injury  Outcome: Met     Problem: Diabetes Comorbidity  Goal: Blood Glucose Level Within Targeted Range  Outcome: Met     Problem: Fluid and Electrolyte Imbalance (Acute Kidney Injury/Impairment)  Goal: Fluid and Electrolyte Balance  Outcome: Met     Problem: Oral Intake Inadequate (Acute Kidney Injury/Impairment)  Goal: Optimal Nutrition Intake  Outcome: Met     Problem: Renal Function Impairment (Acute Kidney Injury/Impairment)  Goal: Effective Renal Function  Outcome: Met     Problem: Impaired Wound Healing  Goal: Optimal Wound Healing  Outcome: Met     Problem: Bariatric Environmental Safety  Goal: Safety Maintained with Care  Outcome: Met     Problem: Infection  Goal: Absence of Infection Signs and Symptoms  Outcome: Met

## 2022-05-13 NOTE — PLAN OF CARE
Recommendations    1) Continue current diet regimen as tolerated  2) Addition of Khurram BID to promote wound healing   3) Monitor nutrition related labs    Goals:   1) Patient to continue to meet > 85% EEN/EPN via PO/ONS intake  2) Monitored labs to trend toward target ranges    Nutrition Goal Status: new  Communication of RD Recs: POC

## 2022-05-13 NOTE — PLAN OF CARE
Problem: Adult Inpatient Plan of Care  Goal: Plan of Care Review  Outcome: Ongoing, Progressing  Goal: Patient-Specific Goal (Individualized)  Outcome: Ongoing, Progressing  Goal: Absence of Hospital-Acquired Illness or Injury  Outcome: Ongoing, Progressing  Goal: Optimal Comfort and Wellbeing  Outcome: Ongoing, Progressing  Goal: Readiness for Transition of Care  Outcome: Ongoing, Progressing     Problem: Skin Injury Risk Increased  Goal: Skin Health and Integrity  Outcome: Ongoing, Progressing     Problem: Fall Injury Risk  Goal: Absence of Fall and Fall-Related Injury  Outcome: Ongoing, Progressing     Problem: Diabetes Comorbidity  Goal: Blood Glucose Level Within Targeted Range  Outcome: Ongoing, Progressing     Problem: Oral Intake Inadequate (Acute Kidney Injury/Impairment)  Goal: Optimal Nutrition Intake  Outcome: Ongoing, Progressing     Problem: Fluid and Electrolyte Imbalance (Acute Kidney Injury/Impairment)  Goal: Fluid and Electrolyte Balance  Outcome: Ongoing, Progressing     Problem: Renal Function Impairment (Acute Kidney Injury/Impairment)  Goal: Effective Renal Function  Outcome: Ongoing, Progressing     Problem: Impaired Wound Healing  Goal: Optimal Wound Healing  Outcome: Ongoing, Progressing     Problem: Infection  Goal: Absence of Infection Signs and Symptoms  Outcome: Ongoing, Progressing     Problem: Bariatric Environmental Safety  Goal: Safety Maintained with Care  Outcome: Ongoing, Progressing

## 2022-05-13 NOTE — PLAN OF CARE
[facility]    FACILITY TRANSFER ORDERS      Patient Name: Audrey Natarajan  YOB: 1970    PCP: Donaldo Pena MD   PCP Address: [facility] [address]  PCP Phone Number: [phone]  PCP Fax: [phone]    Encounter Date: 05/13/2022    Admit to: [facility]    Vital Signs:  Routine    Diagnoses:   Active Hospital Problems    Diagnosis  POA    *Sepsis due to methicillin resistant Staphylococcus aureus (MRSA) [A41.02]  Yes    Constipation [K59.00]  Yes    Iron deficiency anemia [D50.9]  Yes    Cellulitis of both feet [L03.115, L03.116]  Yes    PAD (peripheral artery disease) [I73.9]  Yes    Bacteremia due to Staphylococcus [R78.81, B95.8]  Yes    Osteomyelitis of right foot [M86.9]  Yes    Hyponatremia [E87.1]  Yes    Hyperkalemia [E87.5]  Yes    Chronic deep vein thrombosis (DVT) of both lower extremities [I82.503]  Yes    Diabetic foot ulcer associated with type 2 diabetes mellitus [E11.621, L97.509]  Yes    Long term (current) use of anticoagulants [Z79.01]  Not Applicable    Severe obesity (BMI >= 40) [E66.01]  Yes    Thrombocytosis [D75.839]  Yes    Bipolar 1 disorder [F31.9]  Yes     Chronic    Diabetic neuropathy [E11.40]  Yes     Overview:   dx update      COPD (chronic obstructive pulmonary disease) [J44.9]  Yes     Chronic    Essential hypertension [I10]  Yes     Chronic      Resolved Hospital Problems    Diagnosis Date Resolved POA    Chest pain [R07.9] 05/06/2022 Yes    Infection [B99.9] 05/06/2022 Yes    Wound infection [T14.8XXA, L08.9] 05/06/2022 Yes    Anemia [D64.9] 05/06/2022 Yes    Cellulitis of lower extremity [L03.119] 05/06/2022 Yes    Hypercoagulable state [D68.59] 05/06/2022 Yes    Leukocytosis [D72.829] 05/06/2022 Yes       Allergies:  Review of patient's allergies indicates:   Allergen Reactions    Morphine Other (See Comments)     Patient had a psychotic episode after taking Morphine  Agitation, hallucinations     Penicillins Anaphylaxis     itching    Januvia [sitagliptin] Hives    Carbamazepine Other (See Comments)     hyponatremia       Diet: diabetic diet: 2000 calorie    Activities: activity as tolerated  Partial heel weight bearing for transfers only  darco shoe for bilateral feet    Nursing: per facility protocol    Labs: per facility protocol    CONSULTS:     to evaluate for community resources/long-range planning.     PT evaluate and treat     Pharmacy to dose vancomycin    Wound care consult    Podiatry consult    Infectious Disease consult    MISCELLANEOUS CARE:  PICC line protocol    Outpatient Antibiotic Therapy Plan:     Please send referral to [facility].     1) Infection: complicated mrsa bacteremia, possible MRSA osteomyelitis foot     2) Discharge Antibiotics:     Intravenous antibiotics:  · Iv vancomycin, pharmacy to dose        3) Therapy Duration:  6 weeks     Estimated end date of IV antibiotics: 6/14/22     4) Outpatient Weekly Labs:     Order the following labs to be drawn on Mondays:   · CBC  · CMP   · ESR   · CRP  · Vancomycin trough. Target 15-20     If discharged on vancomycin IV, order the following additional labs to be drawn on Thursdays:  · CMP   · Vancomycin trough. Target 15-20     If vancomycin trough is not at target (15-20) prior to discharge, schedule vancomycin trough to be drawn before their fourth outpatient dose.     5) Fax Lab Results to Infectious Diseases Provider: Dr Zavala     [facility] Fax Number: [phone]     6) Outpatient Infectious Diseases Follow-up     · Follow-up appointment will be arranged by the ID clinic and will be found in the patient's appointments tab.     · Prior to discharge, please ensure the patient's follow-up has been scheduled.    · If there is still no follow-up scheduled prior to discharge, please send an EPIC message to Leidy Brooks in Infectious Diseases.          WOUND CARE ORDERS  Wounds  B/L to be done every 2-3 days Cleanse wounds B/L with saline. pat dry. Apply hydrofera blue to all wounds  apply non adherent foam wrap with cast padding, kerlix and ACE.     Medications: Review discharge medications with patient and family and provide education.        Current Discharge Medication List      START taking these medications    Details   oxyCODONE-acetaminophen (PERCOCET) 7.5-325 mg per tablet Take 1 tablet by mouth every 4 (four) hours as needed for Pain.  Qty: 10 tablet, Refills: 0    Comments: Quantity prescribed more than 7 day supply? No         CONTINUE these medications which have NOT CHANGED    Details   acetaminophen (TYLENOL) 500 MG tablet Take 2 tablets (1,000 mg total) by mouth every 6 (six) hours as needed for Pain.  Qty: 30 tablet, Refills: 0      albuterol-ipratropium (DUO-NEB) 2.5 mg-0.5 mg/3 mL nebulizer solution Take 3 mLs by nebulization every 6 (six) hours as needed for Wheezing or Shortness of Breath. Rescue  Qty: 1 Box, Refills: 0    Associated Diagnoses: Chronic obstructive pulmonary disease, unspecified COPD type      apixaban (ELIQUIS) 5 mg Tab Take 1 tablet (5 mg total) by mouth 2 (two) times daily.  Qty: 30 tablet, Refills: 3      aspirin 81 MG Chew Take 1 tablet (81 mg total) by mouth once daily.  Qty: 30 tablet, Refills: 11      !! divalproex (DEPAKOTE) 250 MG EC tablet Take 5 tablets (1,250 mg total) by mouth every evening.  Qty: 150 tablet, Refills: 11      !! divalproex (DEPAKOTE) 500 MG TbEC Take 1 tablet (500 mg total) by mouth once daily. PO QAM  Qty: 30 tablet, Refills: 11      fluticasone propionate (FLONASE) 50 mcg/actuation nasal spray 1 spray (50 mcg total) by Each Nostril route 2 (two) times daily.  Qty: 16 g, Refills: 0      loratadine (CLARITIN) 10 mg tablet Take 1 tablet (10 mg total) by mouth once daily.  Qty: 60 tablet, Refills: 0      metFORMIN (GLUCOPHAGE) 1000 MG tablet TAKE 1 TABLET(1000 MG) BY MOUTH TWICE DAILY WITH MEALS  Qty: 180 tablet, Refills: 2     Associated Diagnoses: Type 2 diabetes mellitus with diabetic polyneuropathy, without long-term current use of insulin      metoprolol tartrate (LOPRESSOR) 50 MG tablet TAKE 1 TABLET(50 MG) BY MOUTH TWICE DAILY  Qty: 180 tablet, Refills: 2    Associated Diagnoses: Essential hypertension      OLANZapine (ZYPREXA) 10 MG tablet Take 1 tablet (10 mg total) by mouth every 8 (eight) hours as needed (Agitation).  Qty: 30 tablet, Refills: 11      pantoprazole (PROTONIX) 40 MG tablet Take 1 tablet (40 mg total) by mouth once daily.  Qty: 30 tablet, Refills: 0      pravastatin (PRAVACHOL) 40 MG tablet TAKE 1 TABLET(40 MG) BY MOUTH EVERY EVENING  Qty: 90 tablet, Refills: 3    Associated Diagnoses: Type 2 diabetes mellitus with diabetic polyneuropathy, without long-term current use of insulin      risperiDONE (RISPERDAL M-TABS) 3 MG disintegrating tablet Take 1 tablet (3 mg total) by mouth 2 (two) times daily.  Qty: 60 tablet, Refills: 11      fluticasone-salmeterol diskus inhaler 250-50 mcg Inhale 1 puff into the lungs 2 (two) times daily. Controller  Qty: 60 each, Refills: 2    Associated Diagnoses: Chronic obstructive pulmonary disease, unspecified COPD type      hydrOXYzine pamoate (VISTARIL) 50 MG Cap Take 1 capsule (50 mg total) by mouth 3 (three) times daily.  Qty: 90 capsule, Refills: 2      multivitamin Tab Take 1 tablet by mouth once daily.  Qty: 30 tablet, Refills: 2       !! - Potential duplicate medications found. Please discuss with provider.      STOP taking these medications       albuterol (PROVENTIL/VENTOLIN HFA) 90 mcg/actuation inhaler Comments:   Reason for Stopping:         dicyclomine (BENTYL) 20 mg tablet Comments:   Reason for Stopping:         doxycycline (VIBRAMYCIN) 100 MG Cap Comments:   Reason for Stopping:         DUPIXENT  mg/2 mL PnIj Comments:   Reason for Stopping:         EPITOL 200 mg tablet Comments:   Reason for Stopping:         famotidine (PEPCID) 20 MG tablet Comments:   Reason  for Stopping:         furosemide (LASIX) 20 MG tablet Comments:   Reason for Stopping:         gabapentin (NEURONTIN) 300 MG capsule Comments:   Reason for Stopping:         hydrOXYzine (ATARAX) 50 MG tablet Comments:   Reason for Stopping:         ibuprofen (ADVIL,MOTRIN) 600 MG tablet Comments:   Reason for Stopping:         lisinopriL 10 MG tablet Comments:   Reason for Stopping:         magnesium oxide (MAG-OX) 400 mg (241.3 mg magnesium) tablet Comments:   Reason for Stopping:         metroNIDAZOLE (FLAGYL) 500 MG tablet Comments:   Reason for Stopping:         OPTICHAMBER BIGG LG MASK Spcr Comments:   Reason for Stopping:         polyvinyl alcohol, artificial tears, (LIQUIFILM TEARS) 1.4 % ophthalmic solution Comments:   Reason for Stopping:         senna (SENOKOT) 8.6 mg tablet Comments:   Reason for Stopping:         blood sugar diagnostic Strp Comments:   Reason for Stopping:         blood-glucose meter kit Comments:   Reason for Stopping:         blood-glucose meter kit Comments:   Reason for Stopping:         diclofenac sodium (VOLTAREN) 1 % Gel Comments:   Reason for Stopping:         lancets Misc Comments:   Reason for Stopping:         nystatin (NYSTOP) powder Comments:   Reason for Stopping:         QUEtiapine (SEROQUEL) 200 MG Tab Comments:   Reason for Stopping:         TRUE METRIX GLUCOSE METER Misc Comments:   Reason for Stopping:                  Immunizations Administered as of 5/13/2022     No immunizations on file.              Some patients may experience side effects after vaccination.  These may include fever, headache, muscle or joint aches.  Most symptoms resolve with 24-48 hours and do not require urgent medical evaluation unless they persist for more than 72 hours or symptoms are concerning for an unrelated medical condition.          _________________________________  Scott Fuller MD  05/13/2022

## 2022-05-13 NOTE — PROGRESS NOTES
[facility]  Podiatry  Progress Note    Patient Name: Audrey Natarajan  MRN: [id number]  Admission Date: 5/4/2022  Hospital Length of Stay: 9 days  Attending Physician: No att. providers found  Primary Care Provider: Donaldo Pena MD     Subjective:     History of Present Illness: 50 y/o female PMH DM2, bipolar admitted for wound infection B/L. Patient recently discharged from [facility] on 4/26/22. Patient reports unable to take medications as family member was hiding meds from her. Seen earlier today in podiatry clinic Dr. De Los Santos, sent to ED for admission.     5/6/22: Patient seen bedside. Bandages intact B/L    5/8/2022 patient seen at bedside resting comfortably.  Dressing somewhat disheveled because she relates that she walks on floor without any type of shoe.  She relates feeling overall better.  No new pedal complaints.    5/9/22: Patient seen bedside. Per nurse patient declined heel protector boots. Only has one DARCO shoe at bedside. Contacted nurse to order additional DARCO shoe.     5/10/2022 Patient seen bedside. Resting comfortably. No new pedal complaints    5/11/22: S/p one day B/L foot debridement. Bandages intact B/L     5/13/2022 Patient seen bedside. Resting comfortably. She states she has been getting up to go to the restroom in darco shoes.  When questioned about  How damp and disheveled her dressings are, she states that it is likely due to her sweating profusely.    Scheduled Meds:   apixaban  5 mg Oral BID    aspirin  81 mg Oral Daily    cetirizine  5 mg Oral Daily    divalproex  1,250 mg Oral QHS    divalproex  500 mg Oral Daily    fluticasone furoate-vilanteroL  1 puff Inhalation Daily    fluticasone propionate  2 spray Each Nostril Daily    hydrOXYzine pamoate  50 mg Oral TID    metoprolol tartrate  50 mg Oral BID    pantoprazole  40 mg Oral Daily    polyethylene glycol  17 g Oral Daily    pravastatin  40 mg Oral QHS    risperiDONE  2 mg Oral Daily    risperiDONE   3 mg Oral QHS    senna-docusate 8.6-50 mg  2 tablet Oral BID    sodium chloride 0.9%  10 mL Intravenous Q6H    vancomycin (VANCOCIN) IVPB  1,500 mg Intravenous Q12H     Continuous Infusions:    PRN Meds:sodium chloride, acetaminophen, dextrose 10%, dextrose 10%, glucagon (human recombinant), glucagon (human recombinant), glucose, glucose, insulin aspart U-100, naloxone, OLANZapine, oxyCODONE-acetaminophen, [place] PICC Protocol **AND** sodium chloride 0.9% **AND** sodium chloride 0.9%, Pharmacy to dose Vancomycin consult **AND** vancomycin - pharmacy to dose    Review of patient's allergies indicates:   Allergen Reactions    Morphine Other (See Comments)     Patient had a psychotic episode after taking Morphine  Agitation, hallucinations    Penicillins Anaphylaxis     itching    Januvia [sitagliptin] Hives    Carbamazepine Other (See Comments)     hyponatremia        Past Medical History:   Diagnosis Date    ADHD (attention deficit hyperactivity disorder)     Arthritis     Asthma     Bipolar 1 disorder     Cataract     Cigarette smoker     COPD (chronic obstructive pulmonary disease)     Coronary artery disease     A fib    Depression     bipolar manic depresson    Diabetes mellitus     Diabetic foot ulcers     Diabetic neuropathy     DVT of lower extremity, bilateral 07/2013    bilateral LE DVT. [place] filter placed.     Encounter for blood transfusion     History of blood clots 1. Left Leg=2003; 2.Bilateral Groin=Blood Clots= 5 or 6/ 2013 & 7/2013; 3. LLL of Lung=7/2013;  4. Lt. Lower Leg=7/2013.     Pt. had 1st Blood Clot after Hxidacfnllcj=9296, & Last=2013. [place] Filter= Rt.Lateral Neck.    HTN (hypertension) 06/06/2013    Pt states that she does not have hypertension    Hypercholesteremia     Irregular heartbeat     Neuromuscular disorder     neuropathy feet    Obese     PE (pulmonary embolism) 07/2013    bilat LE DVT.     Restless leg syndrome      Past Surgical  "History:   Procedure Laterality Date    ABDOMINAL SURGERY  2010    gastric sleeve    BILATERAL OOPHORECTOMY Bilateral 2015    CHOLECYSTECTOMY      DEBRIDEMENT OF FOOT Bilateral 5/10/2022    Procedure: DEBRIDEMENT, FOOT;  Surgeon: Maira De Los Santos DPM;  Location: [facility] OR;  Service: Podiatry;  Laterality: Bilateral;    Green' s filter Right 2012    Right Neck & Tunneled Down.    HERNIA REPAIR      "[place] of Hernias Repaires around th [place].", pt. states    LAPAROSCOPIC CHOLECYSTECTOMY N/A 9/10/2020    Procedure: CHOLECYSTECTOMY, LAPAROSCOPIC;  Surgeon: Montrell Gutierrez MD;  Location: [facility] OR;  Service: General;  Laterality: N/A;  RN PREOP ----COVID Negative      OVARIAN CYST REMOVAL  3/13/2014    ND REMOVAL OF OVARY/TUBE(S)      SPLENECTOMY, TOTAL  2003    TONSILLECTOMY      as a child    TYMPANOSTOMY TUBE PLACEMENT      VEIN SURGERY      Lt leg       Family History     Problem Relation (Age of Onset)    Cataracts Father    Diabetes Father, Paternal Grandfather    Heart disease Father, Paternal Grandfather    Hypertension Father    No Known Problems Mother, Sister, Brother, Maternal Aunt, Maternal Uncle, Paternal Aunt, Paternal Uncle, Maternal Grandfather    Ovarian cancer Maternal Grandmother, Paternal Grandmother        Tobacco Use    Smoking status: Current Every Day Smoker     Packs/day: 1.00     Years: 37.00     Pack years: 37.00     Types: Cigarettes     Last attempt to quit: 2020     Years since quittin.4    Smokeless tobacco: Never Used    Tobacco comment: Enrolled in the [employer] on 5/3/14 ([facility] Member ID # [id number]). Ambulatory referral to Smoking Cessation Program   Substance and Sexual Activity    Alcohol use: No     Alcohol/week: 0.0 standard drinks    Drug use: No    Sexual activity: Yes     Partners: Male     Review of Systems   Constitutional: Positive for activity change. Negative for appetite change, chills, fatigue and fever. "   Respiratory: Negative for cough and shortness of breath.    Cardiovascular: Positive for leg swelling. Negative for chest pain.   Gastrointestinal: Negative for diarrhea, nausea and vomiting.   Musculoskeletal: Positive for arthralgias and myalgias.   Skin: Positive for wound.   Neurological: Positive for numbness. Negative for weakness.        + paresthesia    Psychiatric/Behavioral: The patient is nervous/anxious.      Objective:     Vital Signs (Most Recent):  Temp: 98.4 °F (36.9 °C) (05/13/22 1715)  Pulse: 81 (05/13/22 1715)  Resp: 20 (05/13/22 1715)  BP: (!) 170/74 (05/13/22 1715)  SpO2: 95 % (05/13/22 1715) Vital Signs (24h Range):  Temp:  [98.1 °F (36.7 °C)-99 °F (37.2 °C)] 98.4 °F (36.9 °C)  Pulse:  [65-81] 81  Resp:  [16-20] 20  SpO2:  [92 %-96 %] 95 %  BP: (150-170)/(65-77) 170/74     Weight: 126.4 kg (278 lb 10.6 oz)  Body mass index is 44.98 kg/m².    Foot Exam    General  Orientation: alert and oriented to person, place, and time       Right Foot/Ankle     Neurovascular  Dorsalis pedis: 1+  Posterior tibial: 1+  Saphenous nerve sensation: diminished  Tibial nerve sensation: diminished  Superficial peroneal nerve sensation: diminished  Deep peroneal nerve sensation: diminished  Sural nerve sensation: diminished      Left Foot/Ankle      Neurovascular  Dorsalis pedis: 1+  Posterior tibial: 1+  Saphenous nerve sensation: diminished  Tibial nerve sensation: diminished  Superficial peroneal nerve sensation: diminished  Deep peroneal nerve sensation: diminished  Sural nerve sensation: diminished            5/13/22 5/11/22:            Suture intact skin edges well coapted left plantar 2nd toe.         5/9/22:                05/08/2022 5/6/22:     Wound 1: Left plantar foot   Measurement: 6sya8akb0.3cm  pre debridement 5.5cmx5.5cmx0.4cm post debridement.  Base: fibrogranular base   Periwound skin: HPK, maceration   Drainage: serous   Erythema: mild  Probe: deep probe          Pre debridement       Post debridement             5/4/22:    Right foot- Epic unable to load image  Right plantar hallux- probe to periosteum fibrotic base  Right plantar forefoot - wound with fibrotic base.   Left foot  Left plantar foot ulceration with necrotic base deep probe to bone        Laboratory:  CBC:   Recent Labs   Lab 05/13/22  0528   WBC 10.76   RBC 2.69*   HGB 7.3*   HCT 23.0*   *   MCV 86   MCH 27.1   MCHC 31.7*     CMP:   Recent Labs   Lab 05/10/22  0458 05/11/22  0420 05/12/22  0509   *   < > 136*   CALCIUM 8.5*   < > 8.6*   ALBUMIN 2.3*  --   --    PROT 6.3  --   --    *   < > 136   K 5.6*   < > 5.1   CO2 23   < > 27      < > 103   BUN 11   < > 10   CREATININE 0.8   < > 0.7   ALKPHOS 96  --   --    ALT 11  --   --    AST 7*  --   --    BILITOT 0.2  --   --     < > = values in this interval not displayed.       Diagnostic Results:  Xray:  X-Ray Foot Complete Right  Order: [id number]   Status: Final result     Visible to patient: Yes (not seen)     Next appt: 05/09/2022 at 02:00 PM in Gastroenterology (Saloni De Anda MD)     Dx: Infection     0 Result Notes    Details    Reading Physician Reading Date Result Priority   Josr Almanzar MD  [phone]  [phone] 5/4/2022 STAT     Narrative & Impression  EXAMINATION:  XR FOOT COMPLETE 3 VIEW RIGHT     CLINICAL HISTORY:  . Unspecified infectious disease     TECHNIQUE:  AP, lateral, and oblique views of the right foot were performed.     COMPARISON:  Right foot radiograph 04/20/2022.     FINDINGS:  No definite evidence of acute fracture or dislocation.  Lisfranc joint appears congruent.  There appears to be chronic appearing deformity at the 4th digit metatarsal head and neck with erosive change at the lateral aspect of the head.     Joint spaces appear to be maintained.  There is soft tissue swelling most pronounced at the dorsum of the mid and forefoot.  No definite radiopaque foreign body.  Enthesopathic change  at the calcaneus.     Impression:     No definite evidence of acute fracture or dislocation.     MRI: MRI Foot (Forefoot) Right Without Contrast  Order: [id number]   Status: Final result     Visible to patient: Yes (not seen)     Next appt: 05/09/2022 at 02:00 PM in Gastroenterology (Saloni De Anda MD)     Dx: Other acute osteomyelitis, unspecifie...     0 Result Notes    Details    Reading Physician Reading Date Result Priority   Tu Santos Jr., MD  [phone] 5/6/2022 Routine     Narrative & Impression  EXAMINATION:  MRI FOOT (FOREFOOT) RIGHT WITHOUT CONTRAST     CLINICAL HISTORY:  Osteomyelitis, foot;eval OM, abscess right  plantar forefoot ulceration right hallux;  Other acute osteomyelitis, unspecified ankle and foot     TECHNIQUE:  Noncontrast MRI of the right forefoot     COMPARISON:  X-ray 05/04/2022     FINDINGS:  There are hammertoe deformities of digits 2 through 4.  There is a fracture of the 4th metatarsal head without surrounding marrow edema but with visualization of the fracture line suggesting that it may be subacute to chronic.     There is soft tissue ulceration and skin thickening of the distal aspect of the great toe with subtle high T2 and low T1 signal involving the distal tuft compatible with cellulitis and osteomyelitis.  No focal soft tissue fluid collection.  Diffuse dorsal forefoot soft tissue edema is present and there are chronic denervation changes throughout the muscles.     Impression:     Cellulitis of the forefoot and great toe with osteomyelitis of the distal tuft of the 1st digit     Fracture of the 4th metatarsal neck, possibly subacute to chronic.           MRI: MRI Foot (Midfoot) Left Without Contrast  Order: [id number]   Status: Final result     Visible to patient: Yes (not seen)     Next appt: 05/09/2022 at 02:00 PM in Gastroenterology (Saloni De Anda MD)     Dx: Other acute osteomyelitis, unspecifie...     0 Result Notes    Details    Reading Physician  Reading Date Result Priority   Tu Santos Jr., MD  [phone] 5/6/2022 Routine     Narrative & Impression  EXAMINATION:  MRI FOOT (MIDFOOT) LEFT WITHOUT CONTRAST     CLINICAL HISTORY:  Osteomyelitis, foot;R/o abscess, OM left plantar foot ulceration;  Other acute osteomyelitis, unspecified ankle and foot     TECHNIQUE:  Multiplanar multisequence MRI of the left midfoot without intravenous contrast.     COMPARISON:  X-ray 04/20/2022     FINDINGS:  Diffuse advanced destructive arthropathic changes are seen throughout the midfoot with abnormal flatfoot deformity.  There is marked skin thickening and a large area of soft tissue ulceration at the plantar aspect of the midfoot without localized fluid collection or evidence of a sinus tract extending to the bone.  Findings are associated with disruption the central aspect of the plantar aponeurosis and diffuse high signal within the plantar muscles of the midfoot.  No definite fatty atrophy.     Diffuse forefoot and midfoot soft tissue edema is     Impression:     Midfoot and forefoot cellulitis and plantar soft tissue phlegmon.     Destructive arthropathy of the midfoot most likely related to Charcot changes with superimposed septic arthritis difficult to exclude with certainty given the proximity to the foot ulcer and adjacent inflammatory change.     No drainable abscess.          Arterial US:  Contains abnormal data US Lower Extremity Arteries Bilateral  Order: [id number]   Status: Final result     Visible to patient: Yes (not seen)     Next appt: 05/09/2022 at 02:00 PM in Gastroenterology (Saloni De Anda MD)     Dx: Diabetic ulcer of other part of foot ...     0 Result Notes    Details    Reading Physician Reading Date Result Priority   Samuel Harris MD  [phone]  [phone] 5/6/2022 Routine     Narrative & Impression  EXAMINATION:  US LOWER EXTREMITY ARTERIES BILATERAL     CLINICAL HISTORY:  PAD eval;     TECHNIQUE:  Bilateral lower  extremity arterial duplex ultrasound examination performed. Multiple gray scale and color doppler images were obtained in addition to waveform analysis.     COMPARISON:  Left lower extremity arterial Doppler 07/13/2021     FINDINGS:  Right lower extremity     Common femoral artery: 111-cm/sec; triphasic waveforms     Deep femoral artery, proximal: 70-cm/sec; triphasic waveforms     Superficial femoral artery, proximal: 100-cm/sec; triphasic waveforms     Superficial femoral artery, mid portion: 135-cm/sec; triphasic waveforms     Superficial femoral artery, distal: 137-cm/sec; triphasic waveforms     Popliteal artery, proximal: 83-cm/sec; triphasic waveforms     Popliteal artery, distal: 125-cm/sec; triphasic waveforms     Anterior tibial artery: 121-cm/sec; triphasic waveforms     Posterior tibial artery: 48-cm/sec; triphasic waveforms     Peroneal artery: 48-cm/sec; triphasic waveforms     Dorsalis pedis artery: 133-cm/sec; triphasic waveforms     Left lower extremity     Common femoral artery: 120-cm/sec; triphasic waveforms     Deep femoral artery, proximal: 84-cm/sec; triphasic waveforms     Superficial femoral artery, proximal: 150-cm/sec; triphasic waveforms     Superficial femoral artery, mid portion: 189-cm/sec; triphasic waveforms     Superficial femoral artery, distal: 157-cm/sec; triphasic waveforms     Popliteal artery, proximal: 114-cm/sec; triphasic waveforms     Popliteal artery, distal: 139-cm/sec; triphasic waveforms     Anterior tibial artery: 58-cm/sec; triphasic waveforms     Posterior tibial artery: 61-cm/sec; triphasic waveforms     Peroneal artery: 48-cm/sec; triphasic waveforms     Dorsalis pedis artery: 177-cm/sec; triphasic waveforms     Impression:     1. Sonogram suggest hemodynamically significant stenosis in the left and DPA.  2. Multifocal mild to moderate grade stenoses is suspected throughout the left MIRACLE and, bilateral posterior tibial and peroneal arteries.  This report was  flagged in Epic as abnormal.             Clinical Findings:  B/L ulceration probe to periosteum right hallux     Assessment/Plan:     Active Diagnoses:    Diagnosis Date Noted POA    PRINCIPAL PROBLEM:  Sepsis due to methicillin resistant Staphylococcus aureus (MRSA) [A41.02] 04/18/2022 Yes    Constipation [K59.00] 05/08/2022 Yes    Iron deficiency anemia [D50.9] 05/06/2022 Yes    Cellulitis of both feet [L03.115, L03.116] 05/06/2022 Yes    PAD (peripheral artery disease) [I73.9] 05/06/2022 Yes    Bacteremia due to Staphylococcus [R78.81, B95.8]  Yes    Osteomyelitis of right foot [M86.9]  Yes    Hyponatremia [E87.1] 05/04/2022 Yes    Hyperkalemia [E87.5] 04/21/2022 Yes    Chronic deep vein thrombosis (DVT) of both lower extremities [I82.503] 04/13/2022 Yes    Diabetic foot ulcer associated with type 2 diabetes mellitus [E11.621, L97.509] 04/13/2022 Yes    Long term (current) use of anticoagulants [Z79.01] 09/03/2019 Not Applicable    Severe obesity (BMI >= 40) [E66.01] 08/10/2016 Yes    Thrombocytosis [D75.839] 07/16/2016 Yes    Bipolar 1 disorder [F31.9] 04/13/2015 Yes     Chronic    Diabetic neuropathy [E11.40] 11/28/2014 Yes    COPD (chronic obstructive pulmonary disease) [J44.9] 10/11/2013 Yes     Chronic    Essential hypertension [I10] 06/06/2013 Yes     Chronic      Problems Resolved During this Admission:    Diagnosis Date Noted Date Resolved POA    Chest pain [R07.9]  05/06/2022 Yes    Infection [B99.9]  05/06/2022 Yes    Wound infection [T14.8XXA, L08.9]  05/06/2022 Yes    Anemia [D64.9] 04/13/2022 05/06/2022 Yes    Cellulitis of lower extremity [L03.119] 02/10/2021 05/06/2022 Yes    Hypercoagulable state [D68.59] 09/25/2019 05/06/2022 Yes    Leukocytosis [D72.829] 07/16/2016 05/06/2022 Yes       Wounds granular and stable. DSD applied.     Discussed two options with patient. Amputation of  Right hallux, L BKA   vs IV abx for six weeks with aggressive wound care for several months  with no guarantee of wound resolution.  Patient declines amputation of right hallux, also declines L BKA. Elects for IV abx.     Abx per ID    Soft tissue swabs with polymicrobial infection    Patient being discharged to LTAC    Maira De Los Santos DPM  Podiatry  [facility] - Telemetry

## 2022-05-13 NOTE — PROGRESS NOTES
Vancomycin consult follow-up:    Patient reviewed, renal function stable, no new levels, continue current therapy; Next levels due: trough due 5/14/2022 at 2130

## 2022-05-13 NOTE — PROGRESS NOTES
Called back bc radiologist insist picc be advanced. Only 1cm available for advancement. I advanced as far as possible. New sterile dressing applied. New cxr ordered.

## 2022-05-13 NOTE — PROGRESS NOTES
Call report received from Karishma at [employer] and sent via message to nurseBeti.    ADT 30 order placed for [employer] Transportation.  Requested  time:  2000 (Per [employer]'s request)  If transportation does not arrive at ETA time nurse will be instructed to follow protocol for transportation below:   How can I get in touch directly with dispatch, if needed?                 · Non-emergent (stretcher): [phone]      ++NURSING:  If Stretcher does not arrive at requested time please call the above Non Emergent Dispatcher.  If issue not resolved please escalate to your charge nurse for further instructions.

## 2022-05-13 NOTE — PROGRESS NOTES
"[facility]  Adult [facility]  Progress Note    SUMMARY       Recommendations    1) Continue current diet regimen as tolerated  2) Addition of Khurram BID to promote wound healing   3) Monitor nutrition related labs    Goals:   1) Patient to continue to meet > 85% EEN/EPN via PO/ONS intake  2) Monitored labs to trend toward target ranges    Nutrition Goal Status: new  Communication of RD Recs: POC    Assessment and Plan  Nutrition Problem  Increased Nutrient Needs    Related to (etiology):   Diabetic foot ulcers/Cellulitis to both feet    Signs and Symptoms (as evidenced by):   Unhealing wounds requiring wound care, debridement    Interventions/Recommendations (treatment strategy):  Carbohydrate Modified Diet  Commercial Beverage  Collaboration of care with other providers    Nutrition Diagnosis Status:   New      Reason for Assessment    Reason For Assessment: length of stay  Diagnosis:  (Sepsis d/t MRSA)  Relevant Medical History: HTN, COPD, T2DM, FE++ Anemia, Hyperkalemia, Hyponatremia, Constipation  General Information Comments: Pt has maintained good PO intake with no current complaints. Will continue to follow intake and BM's per pt hx of constipation  Nutrition Discharge Planning: Discharge on Diabetic Cardiac Diet    Nutrition Risk Screen    Nutrition Risk Screen: large or nonhealing wound, burn or pressure injury    Nutrition/Diet History    Spiritual, Cultural Beliefs, Spiritism Practices, Values that Affect Care: no  Factors Affecting Nutritional Intake: None identified at this time    Anthropometrics    Temp: 99 °F (37.2 °C)  Height Method: Stated  Height: 5' 6" (167.6 cm)  Height (inches): 66 in  Weight Method: Bed Scale  Weight: 126.4 kg (278 lb 10.6 oz)  Weight (lb): 278.66 lb  Ideal Body Weight (IBW), Female: 130 lb  % Ideal Body Weight, Female (lb): 180.77 %  BMI (Calculated): 45  BMI Grade: greater than 40 - morbid obesity       Lab/Procedures/Meds    Pertinent Labs Reviewed: " reviewed  CBC:  Recent Labs   Lab 05/13/22  0528   WBC 10.76   HGB 7.3*   HCT 23.0*   *     CMP:  No results for input(s): GLUCOSE, CALCIUM, ALBUMIN, PROT, NA, K, CO2, CL, BUN, CREATININE, ALKPHOS, ALT, AST, BILITOT in the last 24 hours.    Glucose   Date Value Ref Range Status   05/12/2022 136 (H) 70 - 110 mg/dL Final       Pertinent Medications Reviewed: reviewed  Scheduled Meds:   apixaban  5 mg Oral BID    aspirin  81 mg Oral Daily    cetirizine  5 mg Oral Daily    divalproex  1,250 mg Oral QHS    divalproex  500 mg Oral Daily    fluticasone furoate-vilanteroL  1 puff Inhalation Daily    fluticasone propionate  2 spray Each Nostril Daily    hydrOXYzine pamoate  50 mg Oral TID    metoprolol tartrate  50 mg Oral BID    pantoprazole  40 mg Oral Daily    polyethylene glycol  17 g Oral Daily    pravastatin  40 mg Oral QHS    risperiDONE  2 mg Oral Daily    risperiDONE  3 mg Oral QHS    senna-docusate 8.6-50 mg  2 tablet Oral BID    sodium chloride 0.9%  10 mL Intravenous Q6H    vancomycin (VANCOCIN) IVPB  1,500 mg Intravenous Q12H     Continuous Infusions:  PRN Meds:.sodium chloride, acetaminophen, dextrose 10%, dextrose 10%, glucagon (human recombinant), glucagon (human recombinant), glucose, glucose, insulin aspart U-100, naloxone, OLANZapine, oxyCODONE-acetaminophen, Flushing PICC Protocol **AND** sodium chloride 0.9% **AND** sodium chloride 0.9%, Pharmacy to dose Vancomycin consult **AND** vancomycin - pharmacy to dose    Estimated/Assessed Needs    Weight Used For Calorie Calculations: 126.4 kg (278 lb 10.6 oz)  Energy Calorie Requirements (kcal): 1906  Energy Need Method: Daphne-St Jeor (x no AF per BMI > 30)  Protein Requirements: 89 - 118g (1.5 - 2.0g/kg IBW per BMI > 30)  Weight Used For Protein Calculations: 59 kg (130 lb)     Estimated Fluid Requirement Method: RDA Method (or per MD)  RDA Method (mL): 1906  CHO Requirement: 238g      Nutrition Prescription Ordered    Current Diet  Order: Diabetic 2000kcal    Evaluation of Received Nutrient/Fluid Intake    Energy Calories Required: meeting needs  Protein Required: meeting needs  Fluid Required: meeting needs  Comments: LBM 5/11  % Intake of Estimated Energy Needs: 75 - 100 %  % Meal Intake: 75 - 100 %    Intake/Output - Last 3 Shifts       05/11 0700  05/12 0659 05/12 0700 05/13 0659 05/13 0700 05/14 0659    P.O. 1020 240 240    I.V. (mL/kg)  10 (0.1)     IV Piggyback       Total Intake(mL/kg) 1020 (8.1) 250 (2) 240 (1.9)    Urine (mL/kg/hr) 1200 (0.4)  1250 (1.9)    Total Output 1200  1250    Net -180 +250 -1010           Urine Occurrence 2 x 5 x     Stool Occurrence 2 x 1 x             Nutrition Risk    Level of Risk/Frequency of Follow-up:  (1 x/week)     Monitor and Evaluation    Food and Nutrient Intake: energy intake, food and beverage intake  Food and Nutrient Adminstration: diet order  Knowledge/Beliefs/Attitudes: food and nutrition knowledge/skill, beliefs and attitudes  Physical Activity and Function: nutrition-related ADLs and IADLs  Anthropometric Measurements: weight, weight change, body mass index  Biochemical Data, Medical Tests and Procedures: electrolyte and renal panel, gastrointestinal profile, glucose/endocrine profile, inflammatory profile, lipid profile  Nutrition-Focused Physical Findings: overall appearance, extremities, muscles and bones, head and eyes, skin     Nutrition Follow-Up    RD Follow-up?: Yes

## 2022-05-13 NOTE — PLAN OF CARE
05/13/22 1619   Final Note   Anticipated Discharge Disposition LTAC   Post-Acute Status   Post-Acute Authorization Placement   Post-Acute Placement Status Set-up Complete/Auth obtained   Discharge Delays (!) PFC Arranged Transportation
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
## 2022-05-18 ENCOUNTER — PATIENT OUTREACH (OUTPATIENT)
Dept: ADMINISTRATIVE | Facility: OTHER | Age: 52
End: 2022-05-18
Payer: MEDICAID

## 2022-05-18 NOTE — PROGRESS NOTES
CHW - Unable to Contact    Unable to contact Audrey Natarajan due to all contacts are invalid numbers.

## 2022-05-31 ENCOUNTER — PATIENT MESSAGE (OUTPATIENT)
Dept: ADMINISTRATIVE | Facility: HOSPITAL | Age: 52
End: 2022-05-31
Payer: MEDICAID

## 2022-06-06 LAB — FUNGUS SPEC CULT: NORMAL

## 2022-06-08 ENCOUNTER — PATIENT OUTREACH (OUTPATIENT)
Dept: ADMINISTRATIVE | Facility: HOSPITAL | Age: 52
End: 2022-06-08
Payer: MEDICAID

## 2022-06-08 NOTE — PROGRESS NOTES
LVM for pt to call back and schedule a nurse BP visit, visit with PCP or give a home BP reading.  
No deformities present

## 2022-06-16 ENCOUNTER — TELEPHONE (OUTPATIENT)
Dept: FAMILY MEDICINE | Facility: CLINIC | Age: 52
End: 2022-06-16
Payer: MEDICAID

## 2022-06-16 NOTE — TELEPHONE ENCOUNTER
----- Message from Karyn Jennings sent at 6/16/2022  4:32 PM CDT -----  Regarding:   .Type:  Sooner Appointment Request    Patient is requesting a sooner appointment.  Patient declined first available appointment listed as well as another facility and provider .  Patient will not accept being placed on the waitlist and is requesting a message be sent to doctor.    Name of Caller: Terra in case manage at      When is the first available appointment?  Aug     Symptoms: hos follow up - [place]     Would the patient rather a call back or a response via My [facility]? Call     Best Call Back Number:  [phone]

## 2022-06-17 ENCOUNTER — TELEPHONE (OUTPATIENT)
Dept: PODIATRY | Facility: CLINIC | Age: 52
End: 2022-06-17
Payer: MEDICAID

## 2022-06-17 NOTE — TELEPHONE ENCOUNTER
Called Terra and scheduled the pt appointment.    Ronna SHIPMAN    ----- Message from Karyn Jacome sent at 6/16/2022  4:34 PM CDT -----  Regarding: WJ  .Type:  Sooner Appointment Request    Patient is requesting a sooner appointment.  Patient declined first available appointment listed as well as another facility and provider .  Patient will not accept being placed on the waitlist and is requesting a message be sent to doctor.    Name of Caller:  Terra NANCE case manage     When is the first available appointment? 7/11     Symptoms: wounds on feet     Would the patient rather a call back or a response via My [facility]? Call     Best Call Back Number:   [phone]

## 2022-06-21 ENCOUNTER — OFFICE VISIT (OUTPATIENT)
Dept: PODIATRY | Facility: CLINIC | Age: 52
End: 2022-06-21
Payer: MEDICAID

## 2022-06-21 VITALS — HEIGHT: 66 IN | BODY MASS INDEX: 44.79 KG/M2 | WEIGHT: 278.69 LBS

## 2022-06-21 DIAGNOSIS — L97.423 LEFT MIDFOOT ULCER, WITH NECROSIS OF MUSCLE: Primary | ICD-10-CM

## 2022-06-21 DIAGNOSIS — L97.512 RIGHT FOOT ULCER, WITH FAT LAYER EXPOSED: ICD-10-CM

## 2022-06-21 PROCEDURE — 99214 PR OFFICE/OUTPT VISIT, EST, LEVL IV, 30-39 MIN: ICD-10-PCS | Mod: S$PBB,,, | Performed by: PODIATRIST

## 2022-06-21 PROCEDURE — 4010F PR ACE/ARB THEARPY RXD/TAKEN: ICD-10-PCS | Mod: CPTII,,, | Performed by: PODIATRIST

## 2022-06-21 PROCEDURE — 1159F MED LIST DOCD IN RCRD: CPT | Mod: CPTII,,, | Performed by: PODIATRIST

## 2022-06-21 PROCEDURE — 3008F PR BODY MASS INDEX (BMI) DOCUMENTED: ICD-10-PCS | Mod: CPTII,,, | Performed by: PODIATRIST

## 2022-06-21 PROCEDURE — 4010F ACE/ARB THERAPY RXD/TAKEN: CPT | Mod: CPTII,,, | Performed by: PODIATRIST

## 2022-06-21 PROCEDURE — 87186 SC STD MICRODIL/AGAR DIL: CPT | Performed by: PODIATRIST

## 2022-06-21 PROCEDURE — 1159F PR MEDICATION LIST DOCUMENTED IN MEDICAL RECORD: ICD-10-PCS | Mod: CPTII,,, | Performed by: PODIATRIST

## 2022-06-21 PROCEDURE — 3051F PR MOST RECENT HEMOGLOBIN A1C LEVEL 7.0 - < 8.0%: ICD-10-PCS | Mod: CPTII,,, | Performed by: PODIATRIST

## 2022-06-21 PROCEDURE — 3008F BODY MASS INDEX DOCD: CPT | Mod: CPTII,,, | Performed by: PODIATRIST

## 2022-06-21 PROCEDURE — 99214 OFFICE O/P EST MOD 30 MIN: CPT | Mod: PBBFAC,PO | Performed by: PODIATRIST

## 2022-06-21 PROCEDURE — 99999 PR PBB SHADOW E&M-EST. PATIENT-LVL IV: ICD-10-PCS | Mod: PBBFAC,,, | Performed by: PODIATRIST

## 2022-06-21 PROCEDURE — 99999 PR PBB SHADOW E&M-EST. PATIENT-LVL IV: CPT | Mod: PBBFAC,,, | Performed by: PODIATRIST

## 2022-06-21 PROCEDURE — 99214 OFFICE O/P EST MOD 30 MIN: CPT | Mod: S$PBB,,, | Performed by: PODIATRIST

## 2022-06-21 PROCEDURE — 87077 CULTURE AEROBIC IDENTIFY: CPT | Mod: 59 | Performed by: PODIATRIST

## 2022-06-21 PROCEDURE — 87070 CULTURE OTHR SPECIMN AEROBIC: CPT | Performed by: PODIATRIST

## 2022-06-21 PROCEDURE — 87075 CULTR BACTERIA EXCEPT BLOOD: CPT | Performed by: PODIATRIST

## 2022-06-21 PROCEDURE — 3051F HG A1C>EQUAL 7.0%<8.0%: CPT | Mod: CPTII,,, | Performed by: PODIATRIST

## 2022-06-21 RX ORDER — FLUCONAZOLE 50 MG/1
50 TABLET ORAL DAILY
Qty: 7 TABLET | Refills: 0 | Status: SHIPPED | OUTPATIENT
Start: 2022-06-21 | End: 2022-07-19 | Stop reason: ALTCHOICE

## 2022-06-21 RX ORDER — DOXYCYCLINE 100 MG/1
100 CAPSULE ORAL EVERY 12 HOURS
Qty: 20 CAPSULE | Refills: 0 | Status: SHIPPED | OUTPATIENT
Start: 2022-06-21 | End: 2022-07-19 | Stop reason: ALTCHOICE
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
## 2022-06-24 DIAGNOSIS — B35.9 TINEA: Primary | ICD-10-CM

## 2022-06-24 RX ORDER — MICONAZOLE NITRATE
POWDER (GRAM) MISCELLANEOUS
Qty: 100 G | Refills: 0 | Status: SHIPPED | OUTPATIENT
Start: 2022-06-24 | End: 2022-09-27 | Stop reason: SDUPTHER

## 2022-06-24 NOTE — TELEPHONE ENCOUNTER
----- Message from Gloria Lowe MA sent at 6/24/2022  7:29 AM CDT -----  Med refill but medication is not active  ----- Message -----  From: Fiona Martínez  Sent: 6/24/2022   7:16 AM CDT  To: Becky Fulton Staff    Type: RX Refill Request    Who Called: self    Have you contacted your pharmacy: no    Refill or New Rx: refill    RX Name and Strength miconazole NITRATE 2 % top powder     Preferred Pharmacy with phone number: [id number] [address]   Phone:  [phone]  Fax:  [phone]      Local or Mail Order: Local    Would the patient rather a call back or a response via [facility]? call    Best Call Back Number [phone]

## 2022-06-25 LAB
ACID FAST MOD KINY STN SPEC: NORMAL
BACTERIA SPEC AEROBE CULT: ABNORMAL
BACTERIA SPEC AEROBE CULT: ABNORMAL
MYCOBACTERIUM SPEC QL CULT: NORMAL

## 2022-06-27 LAB — BACTERIA SPEC ANAEROBE CULT: NORMAL

## 2022-06-28 ENCOUNTER — OFFICE VISIT (OUTPATIENT)
Dept: FAMILY MEDICINE | Facility: CLINIC | Age: 52
End: 2022-06-28
Payer: MEDICAID

## 2022-06-28 ENCOUNTER — OFFICE VISIT (OUTPATIENT)
Dept: PODIATRY | Facility: CLINIC | Age: 52
End: 2022-06-28
Payer: MEDICAID

## 2022-06-28 VITALS
TEMPERATURE: 98 F | HEART RATE: 97 BPM | BODY MASS INDEX: 36.99 KG/M2 | SYSTOLIC BLOOD PRESSURE: 148 MMHG | HEIGHT: 66 IN | WEIGHT: 230.19 LBS | DIASTOLIC BLOOD PRESSURE: 70 MMHG | OXYGEN SATURATION: 99 % | RESPIRATION RATE: 18 BRPM

## 2022-06-28 VITALS — BODY MASS INDEX: 44.68 KG/M2 | WEIGHT: 278 LBS | HEIGHT: 66 IN

## 2022-06-28 DIAGNOSIS — J44.9 CHRONIC OBSTRUCTIVE PULMONARY DISEASE, UNSPECIFIED COPD TYPE: ICD-10-CM

## 2022-06-28 DIAGNOSIS — F31.9 BIPOLAR 1 DISORDER: Chronic | ICD-10-CM

## 2022-06-28 DIAGNOSIS — L97.512 RIGHT FOOT ULCER, WITH FAT LAYER EXPOSED: ICD-10-CM

## 2022-06-28 DIAGNOSIS — I10 ESSENTIAL HYPERTENSION: ICD-10-CM

## 2022-06-28 DIAGNOSIS — I82.5Y9 CHRONIC DEEP VEIN THROMBOSIS (DVT) OF PROXIMAL VEIN OF LOWER EXTREMITY, UNSPECIFIED LATERALITY: Primary | ICD-10-CM

## 2022-06-28 DIAGNOSIS — L97.423 LEFT MIDFOOT ULCER, WITH NECROSIS OF MUSCLE: Primary | ICD-10-CM

## 2022-06-28 DIAGNOSIS — M14.672 CHARCOT'S JOINT OF LEFT FOOT: ICD-10-CM

## 2022-06-28 DIAGNOSIS — E11.42 TYPE 2 DIABETES MELLITUS WITH DIABETIC POLYNEUROPATHY, WITHOUT LONG-TERM CURRENT USE OF INSULIN: ICD-10-CM

## 2022-06-28 PROCEDURE — 4010F ACE/ARB THERAPY RXD/TAKEN: CPT | Mod: CPTII,,, | Performed by: PODIATRIST

## 2022-06-28 PROCEDURE — 99214 OFFICE O/P EST MOD 30 MIN: CPT | Mod: S$PBB,,, | Performed by: NURSE PRACTITIONER

## 2022-06-28 PROCEDURE — 99214 PR OFFICE/OUTPT VISIT, EST, LEVL IV, 30-39 MIN: ICD-10-PCS | Mod: S$PBB,,, | Performed by: NURSE PRACTITIONER

## 2022-06-28 PROCEDURE — 3078F DIAST BP <80 MM HG: CPT | Mod: CPTII,,, | Performed by: NURSE PRACTITIONER

## 2022-06-28 PROCEDURE — 4010F PR ACE/ARB THEARPY RXD/TAKEN: ICD-10-PCS | Mod: CPTII,,, | Performed by: PODIATRIST

## 2022-06-28 PROCEDURE — 4010F ACE/ARB THERAPY RXD/TAKEN: CPT | Mod: CPTII,,, | Performed by: NURSE PRACTITIONER

## 2022-06-28 PROCEDURE — 3077F SYST BP >= 140 MM HG: CPT | Mod: CPTII,,, | Performed by: NURSE PRACTITIONER

## 2022-06-28 PROCEDURE — 3078F PR MOST RECENT DIASTOLIC BLOOD PRESSURE < 80 MM HG: ICD-10-PCS | Mod: CPTII,,, | Performed by: NURSE PRACTITIONER

## 2022-06-28 PROCEDURE — 1159F PR MEDICATION LIST DOCUMENTED IN MEDICAL RECORD: ICD-10-PCS | Mod: CPTII,,, | Performed by: NURSE PRACTITIONER

## 2022-06-28 PROCEDURE — 3051F HG A1C>EQUAL 7.0%<8.0%: CPT | Mod: CPTII,,, | Performed by: PODIATRIST

## 2022-06-28 PROCEDURE — 1159F PR MEDICATION LIST DOCUMENTED IN MEDICAL RECORD: ICD-10-PCS | Mod: CPTII,,, | Performed by: PODIATRIST

## 2022-06-28 PROCEDURE — 1160F RVW MEDS BY RX/DR IN RCRD: CPT | Mod: CPTII,,, | Performed by: NURSE PRACTITIONER

## 2022-06-28 PROCEDURE — 99213 OFFICE O/P EST LOW 20 MIN: CPT | Mod: PBBFAC,PN | Performed by: NURSE PRACTITIONER

## 2022-06-28 PROCEDURE — 3008F BODY MASS INDEX DOCD: CPT | Mod: CPTII,,, | Performed by: NURSE PRACTITIONER

## 2022-06-28 PROCEDURE — 3077F PR MOST RECENT SYSTOLIC BLOOD PRESSURE >= 140 MM HG: ICD-10-PCS | Mod: CPTII,,, | Performed by: NURSE PRACTITIONER

## 2022-06-28 PROCEDURE — 1160F PR REVIEW ALL MEDS BY PRESCRIBER/CLIN PHARMACIST DOCUMENTED: ICD-10-PCS | Mod: CPTII,,, | Performed by: PODIATRIST

## 2022-06-28 PROCEDURE — 99214 PR OFFICE/OUTPT VISIT, EST, LEVL IV, 30-39 MIN: ICD-10-PCS | Mod: S$PBB,,, | Performed by: PODIATRIST

## 2022-06-28 PROCEDURE — 3008F BODY MASS INDEX DOCD: CPT | Mod: CPTII,,, | Performed by: PODIATRIST

## 2022-06-28 PROCEDURE — 99999 PR PBB SHADOW E&M-EST. PATIENT-LVL III: ICD-10-PCS | Mod: PBBFAC,,, | Performed by: PODIATRIST

## 2022-06-28 PROCEDURE — 1160F RVW MEDS BY RX/DR IN RCRD: CPT | Mod: CPTII,,, | Performed by: PODIATRIST

## 2022-06-28 PROCEDURE — 3051F PR MOST RECENT HEMOGLOBIN A1C LEVEL 7.0 - < 8.0%: ICD-10-PCS | Mod: CPTII,,, | Performed by: NURSE PRACTITIONER

## 2022-06-28 PROCEDURE — 99999 PR PBB SHADOW E&M-EST. PATIENT-LVL III: CPT | Mod: PBBFAC,,, | Performed by: PODIATRIST

## 2022-06-28 PROCEDURE — 1159F MED LIST DOCD IN RCRD: CPT | Mod: CPTII,,, | Performed by: NURSE PRACTITIONER

## 2022-06-28 PROCEDURE — 4010F PR ACE/ARB THEARPY RXD/TAKEN: ICD-10-PCS | Mod: CPTII,,, | Performed by: NURSE PRACTITIONER

## 2022-06-28 PROCEDURE — 1160F PR REVIEW ALL MEDS BY PRESCRIBER/CLIN PHARMACIST DOCUMENTED: ICD-10-PCS | Mod: CPTII,,, | Performed by: NURSE PRACTITIONER

## 2022-06-28 PROCEDURE — 3051F PR MOST RECENT HEMOGLOBIN A1C LEVEL 7.0 - < 8.0%: ICD-10-PCS | Mod: CPTII,,, | Performed by: PODIATRIST

## 2022-06-28 PROCEDURE — 1159F MED LIST DOCD IN RCRD: CPT | Mod: CPTII,,, | Performed by: PODIATRIST

## 2022-06-28 PROCEDURE — 99999 PR PBB SHADOW E&M-EST. PATIENT-LVL III: CPT | Mod: PBBFAC,,, | Performed by: NURSE PRACTITIONER

## 2022-06-28 PROCEDURE — 3008F PR BODY MASS INDEX (BMI) DOCUMENTED: ICD-10-PCS | Mod: CPTII,,, | Performed by: NURSE PRACTITIONER

## 2022-06-28 PROCEDURE — 3008F PR BODY MASS INDEX (BMI) DOCUMENTED: ICD-10-PCS | Mod: CPTII,,, | Performed by: PODIATRIST

## 2022-06-28 PROCEDURE — 99213 OFFICE O/P EST LOW 20 MIN: CPT | Mod: PBBFAC,27,PO | Performed by: PODIATRIST

## 2022-06-28 PROCEDURE — 3051F HG A1C>EQUAL 7.0%<8.0%: CPT | Mod: CPTII,,, | Performed by: NURSE PRACTITIONER

## 2022-06-28 PROCEDURE — 99999 PR PBB SHADOW E&M-EST. PATIENT-LVL III: ICD-10-PCS | Mod: PBBFAC,,, | Performed by: NURSE PRACTITIONER

## 2022-06-28 PROCEDURE — 99214 OFFICE O/P EST MOD 30 MIN: CPT | Mod: S$PBB,,, | Performed by: PODIATRIST

## 2022-06-28 RX ORDER — FLUTICASONE PROPIONATE AND SALMETEROL 250; 50 UG/1; UG/1
1 POWDER RESPIRATORY (INHALATION) 2 TIMES DAILY
Qty: 60 EACH | Refills: 2 | Status: SHIPPED | OUTPATIENT
Start: 2022-06-28 | End: 2023-02-06 | Stop reason: SDUPTHER

## 2022-06-28 RX ORDER — LISINOPRIL 10 MG/1
10 TABLET ORAL DAILY
Qty: 90 TABLET | Refills: 1 | Status: SHIPPED | OUTPATIENT
Start: 2022-06-28 | End: 2023-02-06 | Stop reason: SDUPTHER

## 2022-06-28 RX ORDER — PRAVASTATIN SODIUM 40 MG/1
40 TABLET ORAL NIGHTLY
Qty: 90 TABLET | Refills: 1 | Status: SHIPPED | OUTPATIENT
Start: 2022-06-28 | End: 2023-01-04

## 2022-06-28 RX ORDER — METFORMIN HYDROCHLORIDE 1000 MG/1
1000 TABLET ORAL 2 TIMES DAILY WITH MEALS
Qty: 180 TABLET | Refills: 1 | Status: SHIPPED | OUTPATIENT
Start: 2022-06-28 | End: 2022-10-19 | Stop reason: SDUPTHER

## 2022-06-28 RX ORDER — IPRATROPIUM BROMIDE AND ALBUTEROL SULFATE 2.5; .5 MG/3ML; MG/3ML
3 SOLUTION RESPIRATORY (INHALATION) EVERY 6 HOURS PRN
Qty: 1 EACH | Refills: 0 | Status: SHIPPED | OUTPATIENT
Start: 2022-06-28 | End: 2023-02-06 | Stop reason: SDUPTHER
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
## 2022-06-28 NOTE — PATIENT INSTRUCTIONS
Please keep football dressing clean, dry, and intact.  If dressing gets wet please contact our office.    Wear special shoe every time foot is placed on the floor.    Elevate affected foot as much as possible    Stay hydrated.      Nutrition and MyPlate: Protein Foods  This group includes foods that are high in protein. Protein helps the body build new cells and keeps tissues healthy. Most Americans get enough protein without even trying. It can be harder for vegetarians, but plenty of non-meat foods are rich in protein, too. Its best to get protein from a variety of sources.    Nutrient-Rich Choices  Theres a lot more to this food group than just meat and beans. It also includes nuts, seeds, and eggs. There are all sorts of nutrient-rich choices:  Chicken and turkey with the skin removed  Fish and shellfish  Lean beef, pork, or lamb (without visible fat)  Soy products, such as tofu, soybeans (edamame), tempeh, or soymilk  Black beans, kidney beans, haas beans, chickpeas (garbanzo beans), and lentils (Note: beans and peas count as both a protein and a vegetable)  Peanuts, almonds, walnuts, sesame seeds, and sunflower  seeds, as well as foods made from these (such as peanut butter or tahini)  Eggs and foods made with eggs (such as quiche or frittata)  What Makes Meat and Beans Less Healthy?  Fatty meat is not healthy. Before you cook meat, trim off all the fat you can see. Chicken and turkey skin is also high in fat, and should be removed before cooking.  Breading and frying make food less healthy. This includes dishes like fried chicken, fried fish, and refried beans.  Sausage and lunch meats tend to be high in fat and salt. Buy low-fat, low-sodium versions.  One Small Change  Make a meal that includes a non-meat source of protein (such as tofu, lentils, or any other food listed above). Have a better idea? Write it here:  _____________________________________________________________  © 2172-7949 [employer] [facility]  [employer]. [address] [place] [address], [place] [address]. All rights reserved. This information is not intended as a substitute for professional medical care. Always follow your healthcare professional's instructions.

## 2022-06-30 ENCOUNTER — HOSPITAL ENCOUNTER (OUTPATIENT)
Dept: WOUND CARE | Facility: HOSPITAL | Age: 52
Discharge: HOME | End: 2022-06-30
Attending: PODIATRIST
Payer: MEDICAID

## 2022-06-30 VITALS
RESPIRATION RATE: 18 BRPM | TEMPERATURE: 98 F | BODY MASS INDEX: 36.64 KG/M2 | DIASTOLIC BLOOD PRESSURE: 64 MMHG | SYSTOLIC BLOOD PRESSURE: 141 MMHG | WEIGHT: 228 LBS | HEIGHT: 66 IN | HEART RATE: 88 BPM

## 2022-06-30 DIAGNOSIS — M86.9 OSTEOMYELITIS OF RIGHT FOOT: Primary | ICD-10-CM

## 2022-06-30 DIAGNOSIS — L97.512 RIGHT FOOT ULCER, WITH FAT LAYER EXPOSED: ICD-10-CM

## 2022-06-30 DIAGNOSIS — E11.621 DIABETIC FOOT ULCER ASSOCIATED WITH TYPE 2 DIABETES MELLITUS: ICD-10-CM

## 2022-06-30 DIAGNOSIS — E11.621 DIABETIC ULCER OF OTHER PART OF FOOT ASSOCIATED WITH TYPE 2 DIABETES MELLITUS, WITH FAT LAYER EXPOSED, UNSPECIFIED LATERALITY: ICD-10-CM

## 2022-06-30 DIAGNOSIS — L97.502 DIABETIC ULCER OF OTHER PART OF FOOT ASSOCIATED WITH TYPE 2 DIABETES MELLITUS, WITH FAT LAYER EXPOSED, UNSPECIFIED LATERALITY: ICD-10-CM

## 2022-06-30 DIAGNOSIS — L97.509 DIABETIC FOOT ULCER ASSOCIATED WITH TYPE 2 DIABETES MELLITUS: ICD-10-CM

## 2022-06-30 DIAGNOSIS — L97.423 LEFT MIDFOOT ULCER, WITH NECROSIS OF MUSCLE: ICD-10-CM

## 2022-06-30 DIAGNOSIS — M14.672 CHARCOT'S JOINT OF LEFT FOOT: ICD-10-CM

## 2022-06-30 PROCEDURE — 99214 PR OFFICE/OUTPT VISIT, EST, LEVL IV, 30-39 MIN: ICD-10-PCS | Mod: 25,,, | Performed by: FAMILY MEDICINE

## 2022-06-30 PROCEDURE — 17250 CHEM CAUT OF GRANLTJ TISSUE: CPT | Mod: ,,, | Performed by: FAMILY MEDICINE

## 2022-06-30 PROCEDURE — 17250 PR CHEM CAUTERY GRANULATN TISSUE: ICD-10-PCS | Mod: ,,, | Performed by: FAMILY MEDICINE

## 2022-06-30 PROCEDURE — 99214 OFFICE O/P EST MOD 30 MIN: CPT | Mod: 25,,, | Performed by: FAMILY MEDICINE

## 2022-06-30 PROCEDURE — 17250 CHEM CAUT OF GRANLTJ TISSUE: CPT | Performed by: FAMILY MEDICINE

## 2022-06-30 NOTE — PROGRESS NOTES
"[facility]                Progress Note    Subjective:       Patient ID: Audrey Natarajan is a 51 y.o. female.    Chief Complaint: Non-healing Wound    Pt arrived to [facility] ambulated without any issues noted to have darco to Rt foot & short air boot to Lt foot. Pt denies any fever, chills, or flu-like symptoms; reports pain/discomfort to Lt foot 7/10; pt states 'its because of my charcot'. Pt reports not having any issues with drsg since last visit at podiatry clinic. Primary drsg to Lt foot noted to have shifted off wound bed to medial aspect of lt foot; also noted to have hypergranulation; SN used to Lt foot wound. Pt will f/u on Friday with Dr De Los Santos on 7/8.       Review of Systems   Constitutional: Negative.    HENT: Negative.    Eyes: Negative.    Respiratory: Negative.    Cardiovascular: Negative.    Gastrointestinal: Negative.    Endocrine: Negative.    Musculoskeletal: Negative.    Skin: Positive for wound.   All other systems reviewed and are negative.        Objective:        Physical Exam  Vitals reviewed.   Constitutional:       Appearance: She is well-developed.   HENT:      Head: Normocephalic and atraumatic.   Eyes:      Conjunctiva/sclera: Conjunctivae normal.      Pupils: Pupils are equal, round, and reactive to light.   Skin:     General: Skin is warm and dry.      Comments: See wound description for further information   Neurological:      Mental Status: She is alert and oriented to person, place, and time.   Psychiatric:         Behavior: Behavior normal.           Vitals:    06/30/22 1023   BP: (!) 141/64   Pulse: 88   Resp: 18   Temp: 97.9 °F (36.6 °C)     Debridement    Date/Time: 6/30/2022 10:00 AM  Performed by: Jennifer Zambrano MD  Authorized by: Jennifer Zambrano MD     Time out: Immediately prior to procedure a "time out" was called to verify the correct patient, procedure, equipment, support staff and site/side marked as " required.    Consent Done?:  Yes (Written)    Preparation: Patient was prepped and draped in usual sterile fashion    Local anesthesia used?: Yes    Local anesthetic:  Topical anesthetic    Wound Details:    Location:  Left foot    Location:  Left Plantar    Type of Debridement:  Non-excisional       Length (cm):  3.8       Area (sq cm):  19       Width (cm):  5       Percent Debrided (%):  100       Depth (cm):  0.1       Total Area Debrided (sq cm):  19    Depth of debridement:  Subcutaneous tissue    Tissue debrided:  Hypergranulation    Devitalized tissue debrided:  Slough    Debridement - 1st Wound - Instruments: silver nitrate.    Bleeding:  Minimal  Hemostasis Achieved: Yes    Method Used:  Silver Nitrate  Patient tolerance:  Patient tolerated the procedure well with no immediate complications      Assessment:           ICD-10-CM ICD-9-CM   1. Osteomyelitis of right foot  M86.9 730.27   2. Left midfoot ulcer, with necrosis of muscle  L97.423 707.14     728.89   3. Right foot ulcer, with fat layer exposed  L97.512 707.15   4. Charcot's joint of left foot  M14.672 094.0     713.5   5. Diabetic foot ulcer associated with type 2 diabetes mellitus  E11.621 250.80    L97.509 707.15            Wound 04/15/22 2230 Diabetic Ulcer Left medial;plantar Foot (Active)   04/15/22 2230    Pre-existing: Yes   Primary Wound Type: Diabetic ulc   Side: Left   Orientation: medial;plantar   Location: Foot   Wound Number:    Ankle-Brachial Index:    Pulses:    Removal Indication and Assessment:    Wound Outcome:    (Retired) Wound Type:    (Retired) Wound Length (cm):    (Retired) Wound Width (cm):    (Retired) Depth (cm):    Wound Description (Comments):    Removal Indications:    Wound Image   06/30/22 1000   Wound WDL ex 06/30/22 1000   Dressing Appearance Moist drainage 06/30/22 1000   Drainage Amount Large 06/30/22 1000   Drainage Characteristics/Odor Serosanguineous 06/30/22 1000   Appearance Red;Hypergranulation 06/30/22  1000   Black (%), Wound Tissue Color 0 % 06/30/22 1000   Red (%), Wound Tissue Color 100 % 06/30/22 1000   Yellow (%), Wound Tissue Color 0 % 06/30/22 1000   Periwound Area Macerated 06/30/22 1000   Wound Edges Callused 06/30/22 1000   Wound Length (cm) 3.8 cm 06/30/22 1000   Wound Width (cm) 5 cm 06/30/22 1000   Wound Depth (cm) 0.1 cm 06/30/22 1000   Wound Volume (cm^3) 1.9 cm^3 06/30/22 1000   Wound Surface Area (cm^2) 19 cm^2 06/30/22 1000   Tunneling (depth (cm)/location) 0 06/30/22 1000   Undermining (depth (cm)/location) 0 06/30/22 1000   Care Cleansed with:;Sterile normal saline 06/30/22 1000   Dressing Applied 06/30/22 1000   Periwound Care Moisture barrier applied 06/30/22 1000   Off Loading Football dressing;Off loading shoe 06/30/22 1000            Wound 04/15/22 2230 Diabetic Ulcer Right distal;plantar Foot (Active)   04/15/22 2230    Pre-existing: Yes   Primary Wound Type: Diabetic ulc   Side: Right   Orientation: distal;plantar   Location: Foot   Wound Number:    Ankle-Brachial Index:    Pulses:    Removal Indication and Assessment:    Wound Outcome:    (Retired) Wound Type:    (Retired) Wound Length (cm):    (Retired) Wound Width (cm):    (Retired) Depth (cm):    Wound Description (Comments):    Removal Indications:    Wound Image   06/30/22 1000   Wound WDL ex 06/30/22 1000   Dressing Appearance Moist drainage 06/30/22 1000   Drainage Amount Small 06/30/22 1000   Drainage Characteristics/Odor Serosanguineous 06/30/22 1000   Appearance Red 06/30/22 1000   Black (%), Wound Tissue Color 0 % 06/30/22 1000   Red (%), Wound Tissue Color 100 % 06/30/22 1000   Yellow (%), Wound Tissue Color 0 % 06/30/22 1000   Periwound Area Intact 06/30/22 1000   Wound Edges Callused 06/30/22 1000   Wound Length (cm) 1 cm 06/30/22 1000   Wound Width (cm) 1 cm 06/30/22 1000   Wound Depth (cm) 0.3 cm 06/30/22 1000   Wound Volume (cm^3) 0.3 cm^3 06/30/22 1000   Wound Surface Area (cm^2) 1 cm^2 06/30/22 1000   Tunneling (depth  (cm)/location) 0 06/30/22 1000   Undermining (depth (cm)/location) 0 06/30/22 1000   Care Cleansed with:;Sterile normal saline 06/30/22 1000   Dressing Applied 06/30/22 1000   Periwound Care Moisture barrier applied 06/30/22 1000   Off Loading Football dressing;Off loading shoe 06/30/22 1000            Wound 05/04/22 Diabetic Ulcer Right plantar Toe, first (Active)   05/04/22     Pre-existing: Yes   Primary Wound Type: Diabetic ulc   Side: Right   Orientation: plantar   Location: Toe, first   Wound Number:    Ankle-Brachial Index:    Pulses:    Removal Indication and Assessment:    Wound Outcome:    (Retired) Wound Type:    (Retired) Wound Length (cm):    (Retired) Wound Width (cm):    (Retired) Depth (cm):    Wound Description (Comments):    Removal Indications:    Wound Image   06/30/22 1000   Wound WDL ex 06/30/22 1000   Dressing Appearance Moist drainage;Dry;Intact 06/30/22 1000   Drainage Amount Small 06/30/22 1000   Drainage Characteristics/Odor Serosanguineous 06/30/22 1000   Appearance Red;Hypergranulation 06/30/22 1000   Black (%), Wound Tissue Color 0 % 06/30/22 1000   Red (%), Wound Tissue Color 100 % 06/30/22 1000   Yellow (%), Wound Tissue Color 0 % 06/30/22 1000   Periwound Area Intact 06/30/22 1000   Wound Edges Callused 06/30/22 1000   Wound Length (cm) 1.3 cm 06/30/22 1000   Wound Width (cm) 1.4 cm 06/30/22 1000   Wound Depth (cm) 0.1 cm 06/30/22 1000   Wound Volume (cm^3) 0.182 cm^3 06/30/22 1000   Wound Surface Area (cm^2) 1.82 cm^2 06/30/22 1000   Tunneling (depth (cm)/location) 0 06/30/22 1000   Undermining (depth (cm)/location) 0 06/30/22 1000   Care Cleansed with:;Sterile normal saline 06/30/22 1000   Dressing Applied 06/30/22 1000   Periwound Care Moisture barrier applied 06/30/22 1000   Off Loading Football dressing;Off loading shoe 06/30/22 1000            Wound 06/30/22 1041 Diabetic Ulcer Left medial Toe, first (Active)   06/30/22 1041    Pre-existing: Yes   Primary Wound Type: Diabetic  [facility]   Side: Left   Orientation: medial   Location: Toe, first   Wound Number:    Ankle-Brachial Index:    Pulses:    Removal Indication and Assessment:    Wound Outcome:    (Retired) Wound Type:    (Retired) Wound Length (cm):    (Retired) Wound Width (cm):    (Retired) Depth (cm):    Wound Description (Comments):    Removal Indications:    Wound Image   06/30/22 1000   Wound WDL ex 06/30/22 1000   Dressing Appearance Dry;Intact;Clean 06/30/22 1000   Drainage Amount None 06/30/22 1000   Appearance Red;Dry 06/30/22 1000   Tissue loss description Partial thickness 06/30/22 1000   Black (%), Wound Tissue Color 0 % 06/30/22 1000   Red (%), Wound Tissue Color 100 % 06/30/22 1000   Yellow (%), Wound Tissue Color 0 % 06/30/22 1000   Periwound Area Intact 06/30/22 1000   Wound Edges Defined 06/30/22 1000   Wound Length (cm) 0.4 cm 06/30/22 1000   Wound Width (cm) 1.3 cm 06/30/22 1000   Wound Depth (cm) 0.1 cm 06/30/22 1000   Wound Volume (cm^3) 0.052 cm^3 06/30/22 1000   Wound Surface Area (cm^2) 0.52 cm^2 06/30/22 1000   Tunneling (depth (cm)/location) 0 06/30/22 1000   Undermining (depth (cm)/location) 0 06/30/22 1000   Care Cleansed with:;Sterile normal saline 06/30/22 1000   Dressing Applied 06/30/22 1000   Periwound Care Moisture barrier applied 06/30/22 1000   Off Loading Football dressing;Off loading shoe 06/30/22 1000           Plan:          1. Debridement needed and Done today.   2. Continue off-loading / leg elevation   3. Continue eating protein   4. Keep dressing clean and intact  5. Continue with wound care orders and plan as noted in orders.   6. Continue to follow current medication regimen as per pcp   7. Call for any questions / concerns.           Orders Placed This Encounter   Procedures    Debridement     This order was created via procedure documentation     Standing Status:   Standing     Number of Occurrences:   1    Ambulatory referral/consult to Wound Clinic     Standing Status:   Standing      Number of Occurrences:   1     Referral Priority:   Urgent     Referral Type:   Consultation     Referral Reason:   Specialty Services Required     Requested Specialty:   Wound Care     Number of Visits Requested:   1    Change dressing     Clean wound with NS. Gent Ana Maria to periwound. Hydrofera blue Transfer to wound bed, cover with Mextra. Foam to offload wounds. Football drsg (cast padding x3), [place]. Lt foot: short air boot. Rt Foot: darco.        Follow up in 8 days (on 7/8/2022).
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
## 2022-07-08 ENCOUNTER — HOSPITAL ENCOUNTER (OUTPATIENT)
Dept: WOUND CARE | Facility: HOSPITAL | Age: 52
Discharge: HOME | End: 2022-07-08
Attending: PODIATRIST
Payer: MEDICAID

## 2022-07-08 VITALS — SYSTOLIC BLOOD PRESSURE: 130 MMHG | HEART RATE: 80 BPM | DIASTOLIC BLOOD PRESSURE: 70 MMHG | TEMPERATURE: 97 F

## 2022-07-08 DIAGNOSIS — L97.423 LEFT MIDFOOT ULCER, WITH NECROSIS OF MUSCLE: Primary | ICD-10-CM

## 2022-07-08 DIAGNOSIS — E11.621 DIABETIC FOOT ULCER ASSOCIATED WITH TYPE 2 DIABETES MELLITUS: ICD-10-CM

## 2022-07-08 DIAGNOSIS — L97.512 RIGHT FOOT ULCER, WITH FAT LAYER EXPOSED: ICD-10-CM

## 2022-07-08 DIAGNOSIS — L97.509 DIABETIC FOOT ULCER ASSOCIATED WITH TYPE 2 DIABETES MELLITUS: ICD-10-CM

## 2022-07-08 DIAGNOSIS — M14.672 CHARCOT'S JOINT OF LEFT FOOT: ICD-10-CM

## 2022-07-08 PROCEDURE — 11042 DBRDMT SUBQ TIS 1ST 20SQCM/<: CPT | Performed by: PODIATRIST

## 2022-07-08 PROCEDURE — 99499 NO LOS: ICD-10-PCS | Mod: ,,, | Performed by: PODIATRIST

## 2022-07-08 PROCEDURE — 11042 DBRDMT SUBQ TIS 1ST 20SQCM/<: CPT | Mod: ,,, | Performed by: PODIATRIST

## 2022-07-08 PROCEDURE — 11042 WOUND DEBRIDEMENT: ICD-10-PCS | Mod: ,,, | Performed by: PODIATRIST

## 2022-07-08 PROCEDURE — 99499 UNLISTED E&M SERVICE: CPT | Mod: ,,, | Performed by: PODIATRIST

## 2022-07-08 NOTE — PROGRESS NOTES
[facility]                Progress Note    Subjective:       Patient ID: Audrey Natarajan is a 51 y.o. female.    Chief Complaint: No chief complaint on file.    Walked to clinic unaided. Wearing Darco on right foot and air boot on left. States has much edema to legs but none presant as spends most of day in bed with feet elevated. All measurements reflect post debridement sizes. Will attempt to get HH to visit however nurse visit scheduled for Wednesday.      Review of Systems      Objective:        Physical Exam    Vitals:    07/08/22 1430   BP: 130/70   Pulse: 80   Temp: 97.3 °F (36.3 °C)       Assessment:         No diagnosis found.             Plan:                No orders of the defined types were placed in this encounter.       Follow up in about 1 week (around 7/15/2022).

## 2022-07-08 NOTE — PROGRESS NOTES
[facility]                Progress Note    Subjective:       Patient ID: Audrey Natarajan is a 51 y.o. female.    Chief Complaint: No chief complaint on file.    Walked to clinic with Darco on right foot and air boot on left. Dsg to left foot has copious drainage, no edema to leg and states has been keeping elevated most of day and only gets up to when necssary       Review of Systems   Constitutional: Positive for activity change. Negative for appetite change, chills, fatigue and fever.   Respiratory: Negative for cough and shortness of breath.    Cardiovascular: Positive for leg swelling. Negative for chest pain.   Gastrointestinal: Negative for diarrhea, nausea and vomiting.   Musculoskeletal: Positive for arthralgias and myalgias.   Skin: Positive for wound.   Neurological: Positive for numbness. Negative for weakness.        + paresthesia    Psychiatric/Behavioral: The patient is nervous/anxious.          Objective:        Physical Exam  Nursing note reviewed.   Constitutional:       General: She is not in acute distress.     Appearance: She is not toxic-appearing or diaphoretic.   Cardiovascular:      Pulses:           Dorsalis pedis pulses are 1+ on the right side and 1+ on the left side.        Posterior tibial pulses are 2+ on the right side and 2+ on the left side.   Pulmonary:      Effort: No respiratory distress.   Musculoskeletal:      Right lower leg: Edema present.      Left lower leg: Edema present.      Right ankle: Swelling present. No lateral malleolus, medial malleolus, AITF ligament, CF ligament or posterior TF ligament tenderness. Decreased range of motion.      Right Achilles Tendon: No defects. Paniagua's test negative.      Left ankle: Swelling present. No lateral malleolus, medial malleolus, AITF ligament, CF ligament, posterior TF ligament or proximal fibula tenderness. Decreased range of motion.      Left Achilles Tendon: No defects.  Paniagua's test negative.      Right foot: Swelling and tenderness present.      Left foot: Swelling, Charcot foot and tenderness present.      Comments: There is equinus deformity bilateral with decreased dorsiflexion at the ankle joint bilateral    Decreased first MPJ range of motion both weightbearing and nonweightbearing, no crepitus observed the first MP joint, + dorsal flag sign. Mild  bunion deformity is observed .    Patient has hammertoes of digits 2-5 bilateral partially reducible without symptom today.     Skin:     General: Skin is warm and dry.      Coloration: Skin is not pale.      Findings: Erythema and wound (see below) present. No laceration.      Nails: There is no clubbing.   Neurological:      Sensory: No sensory deficit.      Motor: No tremor, atrophy or abnormal muscle tone.      Deep Tendon Reflexes: Reflexes are normal and symmetric.      Comments: Paresthesias, and hyperesthesia bilateral feet at toes with no clearly identified trigger or source.    [place]-Gilberto 5.07 monofilament is intact bilateral feet. Sharp/dull sensation is also intact Bilateral feet.   Psychiatric:         Attention and Perception: She is attentive.         Mood and Affect: Mood is not anxious. Affect is not inappropriate.         Speech: She is communicative. Speech is not slurred.         Behavior: Behavior is not combative.           Vitals:    07/08/22 1430   BP: 130/70   Pulse: 80   Temp: 97.3 °F (36.3 °C)       Assessment:           ICD-10-CM ICD-9-CM   1. Left midfoot ulcer, with necrosis of muscle  L97.423 707.14     728.89   2. Diabetic foot ulcer associated with type 2 diabetes mellitus  E11.621 250.80    L97.509 707.15   3. Charcot's joint of left foot  M14.672 094.0     713.5   4. Right foot ulcer, with fat layer exposed  L97.512 707.15            Wound 04/15/22 2230 Diabetic Ulcer Left medial;plantar Foot (Active)   04/15/22 2230    Pre-existing: Yes   Primary Wound Type: Diabetic ulc   Side: Left    Orientation: medial;plantar   Location: Foot   Wound Number:    Ankle-Brachial Index:    Pulses:    Removal Indication and Assessment:    Wound Outcome:    (Retired) Wound Type:    (Retired) Wound Length (cm):    (Retired) Wound Width (cm):    (Retired) Depth (cm):    Wound Description (Comments):    Removal Indications:    Wound Image   07/08/22 1400   Wound WDL ex 07/08/22 1400   Dressing Appearance Intact;Dried drainage 07/08/22 1400   Drainage Amount Copious 07/08/22 1400   Drainage Characteristics/Odor Brown;Malodorous 07/08/22 1400   Appearance Pink 07/08/22 1400   Tissue loss description Partial thickness 07/08/22 1400   Red (%), Wound Tissue Color 100 % 07/08/22 1400   Periwound Area Intact;Dry 07/08/22 1400   Wound Edges Defined 07/08/22 1400   Wound Length (cm) 3.5 cm 07/08/22 1400   Wound Width (cm) 4 cm 07/08/22 1400   Wound Surface Area (cm^2) 14 cm^2 07/08/22 1400   Care Cleansed with:;Antimicrobial agent;Sterile normal saline 07/08/22 1400   Dressing Changed 07/08/22 1400   Off Loading Off loading shoe;Football dressing 07/08/22 1400   Dressing Change Due 07/15/22 07/08/22 1400            Wound 04/15/22 2230 Diabetic Ulcer Right distal;plantar Foot (Active)   04/15/22 2230    Pre-existing: Yes   Primary Wound Type: Diabetic ulc   Side: Right   Orientation: distal;plantar   Location: Foot   Wound Number:    Ankle-Brachial Index:    Pulses:    Removal Indication and Assessment:    Wound Outcome:    (Retired) Wound Type:    (Retired) Wound Length (cm):    (Retired) Wound Width (cm):    (Retired) Depth (cm):    Wound Description (Comments):    Removal Indications:    Wound Image   07/08/22 1400   Wound WDL ex 07/08/22 1400   Dressing Appearance Dry;Intact 07/08/22 1400   Drainage Amount Scant 07/08/22 1400   Drainage Characteristics/Odor Serosanguineous 07/08/22 1400   Appearance Red 07/08/22 1400   Tissue loss description Partial thickness 07/08/22 1400   Red (%), Wound Tissue Color 100 % 07/08/22 1400    Periwound Area Macerated 07/08/22 1400   Wound Edges Defined 07/08/22 1400   Wound Length (cm) 0.8 cm 07/08/22 1400   Wound Width (cm) 0.5 cm 07/08/22 1400   Wound Depth (cm) 0.2 cm 07/08/22 1400   Wound Volume (cm^3) 0.08 cm^3 07/08/22 1400   Wound Surface Area (cm^2) 0.4 cm^2 07/08/22 1400   Care Cleansed with:;Antimicrobial agent;Sterile normal saline 07/08/22 1400   Dressing Changed 07/08/22 1400   Off Loading Football dressing;Off loading shoe 07/08/22 1400   Dressing Change Due 07/15/22 07/08/22 1400            Wound 05/04/22 Diabetic Ulcer Right plantar Toe, first (Active)   05/04/22     Pre-existing: Yes   Primary Wound Type: Diabetic ulc   Side: Right   Orientation: plantar   Location: Toe, first   Wound Number:    Ankle-Brachial Index:    Pulses:    Removal Indication and Assessment:    Wound Outcome:    (Retired) Wound Type:    (Retired) Wound Length (cm):    (Retired) Wound Width (cm):    (Retired) Depth (cm):    Wound Description (Comments):    Removal Indications:    Wound Image   07/08/22 1400   Wound WDL ex 07/08/22 1400   Dressing Appearance Dry;Intact 07/08/22 1400   Drainage Amount Scant 07/08/22 1400   Drainage Characteristics/Odor Serosanguineous 07/08/22 1400   Appearance Red 07/08/22 1400   Tissue loss description Partial thickness 07/08/22 1400   Red (%), Wound Tissue Color 100 % 07/08/22 1400   Periwound Area Intact;Dry 07/08/22 1400   Wound Edges Open 07/08/22 1400   Wound Length (cm) 1 cm 07/08/22 1400   Wound Width (cm) 1 cm 07/08/22 1400   Wound Depth (cm) 0.2 cm 07/08/22 1400   Wound Volume (cm^3) 0.2 cm^3 07/08/22 1400   Wound Surface Area (cm^2) 1 cm^2 07/08/22 1400   Care Cleansed with:;Antimicrobial agent;Sterile normal saline 07/08/22 1400   Dressing Changed 07/08/22 1400   Periwound Care Moisture barrier applied 07/08/22 1400   Dressing Change Due 07/15/22 07/08/22 1400       [REMOVED]      Wound 06/30/22 1041 Diabetic Ulcer Left medial Toe, first (Removed)   06/30/22 1041     Pre-existing: Yes   Primary Wound Type: Diabetic ulc   Side: Left   Orientation: medial   Location: Toe, first   Wound Number:    Ankle-Brachial Index:    Pulses:    Removal Indication and Assessment:    Wound Outcome: Healed   (Retired) Wound Type:    (Retired) Wound Length (cm):    (Retired) Wound Width (cm):    (Retired) Depth (cm):    Wound Description (Comments):    Removal Indications:    Removed 07/08/22 1445   Wound Image   07/08/22 1400   Wound WDL ex 07/08/22 1400   Dressing Appearance Dry;Intact 07/08/22 1400   Drainage Amount None 07/08/22 1400   Appearance Closed/resurfaced 07/08/22 1400   Wound Length (cm) 0 cm 07/08/22 1400   Wound Width (cm) 0 cm 07/08/22 1400   Wound Depth (cm) 0 cm 07/08/22 1400   Wound Volume (cm^3) 0 cm^3 07/08/22 1400   Wound Surface Area (cm^2) 0 cm^2 07/08/22 1400           Plan:            Wound Debridement    Date/Time: 7/8/2022 10:44 AM  Performed by: Maira De Los Santos DPM  Authorized by: Maira De Los Santos DPM       Wound Details:    Location:  Left foot    Location:  Left Midfoot    Type of Debridement:  Excisional       Length (cm):  3.5       Area (sq cm):  14       Width (cm):  4       Percent Debrided (%):  100       Depth (cm):  0.3       Total Area Debrided (sq cm):  14    Depth of debridement:  Subcutaneous tissue    Tissue debrided:  Epidermis, Subcutaneous, Dermis and Hypergranulation    Devitalized tissue debrided:  Callus and Fibrin    Instruments:  Curette    Bleeding:  Minimal  Hemostasis Achieved: Yes    Method Used:  Silver Nitrate  Patient tolerance:  Patient tolerated the procedure well with no immediate complications        Orders Placed This Encounter   Procedures    Debridement     This order was created via procedure documentation     Standing Status:   Standing     Number of Occurrences:   1    Change dressing     Clean wound with NS. Gent Ana Maria to periwound. Hydrofera blue Transfer to wound bed, cover with Mextra. Foam to offload wounds. Football drsg  (cast padding x3), Coban. Lt foot: short air boot. Rt Foot: darco.        Follow up in about 1 week (around 7/15/2022).

## 2022-07-13 ENCOUNTER — HOSPITAL ENCOUNTER (OUTPATIENT)
Dept: WOUND CARE | Facility: HOSPITAL | Age: 52
Discharge: HOME | End: 2022-07-13
Attending: FAMILY MEDICINE
Payer: MEDICAID

## 2022-07-13 VITALS — DIASTOLIC BLOOD PRESSURE: 61 MMHG | SYSTOLIC BLOOD PRESSURE: 130 MMHG | TEMPERATURE: 98 F | HEART RATE: 97 BPM

## 2022-07-13 DIAGNOSIS — L97.512 RIGHT FOOT ULCER, WITH FAT LAYER EXPOSED: ICD-10-CM

## 2022-07-13 DIAGNOSIS — M14.672 CHARCOT'S JOINT OF LEFT FOOT: ICD-10-CM

## 2022-07-13 DIAGNOSIS — L97.509 DIABETIC FOOT ULCER ASSOCIATED WITH TYPE 2 DIABETES MELLITUS: ICD-10-CM

## 2022-07-13 DIAGNOSIS — E11.621 DIABETIC ULCER OF OTHER PART OF FOOT ASSOCIATED WITH TYPE 2 DIABETES MELLITUS, WITH FAT LAYER EXPOSED, UNSPECIFIED LATERALITY: ICD-10-CM

## 2022-07-13 DIAGNOSIS — M86.9 OSTEOMYELITIS OF RIGHT FOOT: ICD-10-CM

## 2022-07-13 DIAGNOSIS — L97.502 DIABETIC ULCER OF OTHER PART OF FOOT ASSOCIATED WITH TYPE 2 DIABETES MELLITUS, WITH FAT LAYER EXPOSED, UNSPECIFIED LATERALITY: ICD-10-CM

## 2022-07-13 DIAGNOSIS — E11.621 DIABETIC FOOT ULCER ASSOCIATED WITH TYPE 2 DIABETES MELLITUS: ICD-10-CM

## 2022-07-13 DIAGNOSIS — L97.423 LEFT MIDFOOT ULCER, WITH NECROSIS OF MUSCLE: Primary | ICD-10-CM

## 2022-07-13 PROCEDURE — 99212 OFFICE O/P EST SF 10 MIN: CPT

## 2022-07-13 NOTE — PROGRESS NOTES
[facility] [address] [place]  [address]  Nurse Visit    Subjective:       Patient seen in clinic today.  Dressing changed as ordered.      Assessment:          Wound 04/15/22 2230 Diabetic Ulcer Left medial;plantar Foot (Active)   04/15/22 2230    Pre-existing: Yes   Primary Wound Type: Diabetic ulc   Side: Left   Orientation: medial;plantar   Location: Foot   Wound Number:    Ankle-Brachial Index:    Pulses:    Removal Indication and Assessment:    Wound Outcome:    (Retired) Wound Type:    (Retired) Wound Length (cm):    (Retired) Wound Width (cm):    (Retired) Depth (cm):    Wound Description (Comments):    Removal Indications:    Wound WDL ex 07/13/22 1400   Dressing Appearance Intact;Moist drainage;Area marked 07/13/22 1400   Drainage Amount Copious 07/13/22 1400   Drainage Characteristics/Odor Brown;Serosanguineous;Malodorous 07/13/22 1400   Appearance Pink;Moist 07/13/22 1400   Tissue loss description Partial thickness 07/13/22 1400   Red (%), Wound Tissue Color 100 % 07/13/22 1400   Periwound Area Intact;Dry 07/13/22 1400   Wound Edges Defined;Callused 07/13/22 1400   Care Cleansed with:;Antimicrobial agent;Sterile normal saline 07/13/22 1400   Dressing Changed 07/13/22 1400   Off Loading Football dressing;Off loading shoe 07/13/22 1400   Dressing Change Due 07/15/22 07/13/22 1400            Wound 04/15/22 2230 Diabetic Ulcer Right distal;plantar Foot (Active)   04/15/22 2230    Pre-existing: Yes   Primary Wound Type: Diabetic ulc   Side: Right   Orientation: distal;plantar   Location: Foot   Wound Number:    Ankle-Brachial Index:    Pulses:    Removal Indication and Assessment:    Wound Outcome:    (Retired) Wound Type:    (Retired) Wound Length (cm):    (Retired) Wound Width (cm):    (Retired) Depth (cm):    Wound Description (Comments):    Removal Indications:    Wound WDL ex 07/13/22 1400   Dressing Appearance Intact;Dry 07/13/22 1400   Drainage Amount None 07/13/22  1400   Appearance Fibrin;Maroon 07/13/22 1400   Tissue loss description Partial thickness 07/13/22 1400   Periwound Area Macerated 07/13/22 1400   Wound Edges Defined;Callused 07/13/22 1400   Care Cleansed with:;Antimicrobial agent;Sterile normal saline 07/13/22 1400   Dressing Changed 07/13/22 1400   Periwound Care Moisture barrier applied 07/13/22 1400   Off Loading Football dressing;Off loading shoe 07/13/22 1400   Dressing Change Due 07/15/22 07/13/22 1400            Wound 05/04/22 Diabetic Ulcer Right plantar Toe, first (Active)   05/04/22     Pre-existing: Yes   Primary Wound Type: Diabetic ulc   Side: Right   Orientation: plantar   Location: Toe, first   Wound Number:    Ankle-Brachial Index:    Pulses:    Removal Indication and Assessment:    Wound Outcome:    (Retired) Wound Type:    (Retired) Wound Length (cm):    (Retired) Wound Width (cm):    (Retired) Depth (cm):    Wound Description (Comments):    Removal Indications:    Wound WDL ex 07/13/22 1400   Dressing Appearance Intact;Dried drainage 07/13/22 1400   Drainage Amount Scant 07/13/22 1400   Drainage Characteristics/Odor Serosanguineous;No odor 07/13/22 1400   Appearance Red;Fibrin 07/13/22 1400   Tissue loss description Partial thickness 07/13/22 1400   Red (%), Wound Tissue Color 100 % 07/13/22 1400   Periwound Area Intact;Dry 07/13/22 1400   Wound Edges Open 07/13/22 1400   Care Cleansed with:;Antimicrobial agent;Sterile normal saline 07/13/22 1400   Dressing Changed 07/13/22 1400   Periwound Care Moisture barrier applied 07/13/22 1400   Off Loading Football dressing;Off loading shoe 07/13/22 1400   Dressing Change Due 07/15/22 07/13/22 1400           Plan:     No orders of the defined types were placed in this encounter.          Follow up in about 2 days (around 7/15/2022) for wound care.

## 2022-07-15 ENCOUNTER — HOSPITAL ENCOUNTER (OUTPATIENT)
Dept: WOUND CARE | Facility: HOSPITAL | Age: 52
Discharge: HOME | End: 2022-07-15
Attending: PODIATRIST
Payer: MEDICAID

## 2022-07-15 VITALS — DIASTOLIC BLOOD PRESSURE: 70 MMHG | HEART RATE: 80 BPM | SYSTOLIC BLOOD PRESSURE: 136 MMHG | TEMPERATURE: 97 F

## 2022-07-15 DIAGNOSIS — L97.509 DIABETIC FOOT ULCER ASSOCIATED WITH TYPE 2 DIABETES MELLITUS: ICD-10-CM

## 2022-07-15 DIAGNOSIS — E11.621 DIABETIC FOOT ULCER ASSOCIATED WITH TYPE 2 DIABETES MELLITUS: ICD-10-CM

## 2022-07-15 DIAGNOSIS — L97.512 RIGHT FOOT ULCER, WITH FAT LAYER EXPOSED: Primary | ICD-10-CM

## 2022-07-15 PROCEDURE — 99499 NO LOS: ICD-10-PCS | Mod: ,,, | Performed by: PODIATRIST

## 2022-07-15 PROCEDURE — 11042 WOUND DEBRIDEMENT: ICD-10-PCS | Mod: ,,, | Performed by: PODIATRIST

## 2022-07-15 PROCEDURE — 99499 UNLISTED E&M SERVICE: CPT | Mod: ,,, | Performed by: PODIATRIST

## 2022-07-15 PROCEDURE — 11042 DBRDMT SUBQ TIS 1ST 20SQCM/<: CPT | Mod: ,,, | Performed by: PODIATRIST

## 2022-07-15 PROCEDURE — 99215 OFFICE O/P EST HI 40 MIN: CPT | Performed by: PODIATRIST

## 2022-07-15 NOTE — PROGRESS NOTES
"[facility]                Progress Note    Subjective:       Patient ID: Audrey Natarajan is a 51 y.o. female.    Chief Complaint: Wound Check    Walked to clinic unaided wearing air boot to left Charcot foot and Darco toright foot. Air boot - Heel was about 3" forward and distal foot extending beyond end of shoe to assure heel is toward back of shoe at all times.       Review of Systems   Constitutional: Positive for activity change. Negative for appetite change, chills, fatigue and fever.   Respiratory: Negative for cough and shortness of breath.    Cardiovascular: Positive for leg swelling. Negative for chest pain.   Gastrointestinal: Negative for diarrhea, nausea and vomiting.   Musculoskeletal: Positive for arthralgias and myalgias.   Skin: Positive for wound.   Neurological: Positive for numbness. Negative for weakness.        + paresthesia    Psychiatric/Behavioral: The patient is nervous/anxious.          Objective:        Physical Exam  Nursing note reviewed.   Constitutional:       General: She is not in acute distress.     Appearance: She is not toxic-appearing or diaphoretic.   Cardiovascular:      Pulses:           Dorsalis pedis pulses are 1+ on the right side and 1+ on the left side.        Posterior tibial pulses are 2+ on the right side and 2+ on the left side.   Pulmonary:      Effort: No respiratory distress.   Musculoskeletal:      Right lower leg: Edema present.      Left lower leg: Edema present.      Right ankle: Swelling present. No lateral malleolus, medial malleolus, AITF ligament, CF ligament or posterior TF ligament tenderness. Decreased range of motion.      Right Achilles Tendon: No defects. Paniagua's test negative.      Left ankle: Swelling present. No lateral malleolus, medial malleolus, AITF ligament, CF ligament, posterior TF ligament or proximal fibula tenderness. Decreased range of motion.      Left Achilles Tendon: No defects. " Paniagua's test negative.      Right foot: Swelling and tenderness present.      Left foot: Swelling, Charcot foot and tenderness present.      Comments: There is equinus deformity bilateral with decreased dorsiflexion at the ankle joint bilateral    Decreased first MPJ range of motion both weightbearing and nonweightbearing, no crepitus observed the first MP joint, + dorsal flag sign. Mild  bunion deformity is observed .    Patient has hammertoes of digits 2-5 bilateral partially reducible without symptom today.     Skin:     General: Skin is warm and dry.      Coloration: Skin is not pale.      Findings: Erythema and wound (see below) present. No laceration.      Nails: There is no clubbing.   Neurological:      Sensory: No sensory deficit.      Motor: No tremor, atrophy or abnormal muscle tone.      Deep Tendon Reflexes: Reflexes are normal and symmetric.      Comments: Paresthesias, and hyperesthesia bilateral feet at toes with no clearly identified trigger or source.    [place]-Gilberto 5.07 monofilament is intact bilateral feet. Sharp/dull sensation is also intact Bilateral feet.   Psychiatric:         Attention and Perception: She is attentive.         Mood and Affect: Mood is not anxious. Affect is not inappropriate.         Speech: She is communicative. Speech is not slurred.         Behavior: Behavior is not combative.           Vitals:    07/15/22 1242   BP: 136/70   Pulse: 80   Temp: 97.2 °F (36.2 °C)       Assessment:           ICD-10-CM ICD-9-CM   1. Right foot ulcer, with fat layer exposed  L97.512 707.15   2. Diabetic foot ulcer associated with type 2 diabetes mellitus  E11.621 250.80    L97.509 707.15            Wound 04/15/22 2230 Diabetic Ulcer Left medial;plantar Foot (Active)   04/15/22 2230    Pre-existing: Yes   Primary Wound Type: Diabetic ulc   Side: Left   Orientation: medial;plantar   Location: Foot   Wound Number:    Ankle-Brachial Index:    Pulses:    Removal Indication and Assessment:     Wound Outcome:    (Retired) Wound Type:    (Retired) Wound Length (cm):    (Retired) Wound Width (cm):    (Retired) Depth (cm):    Wound Description (Comments):    Removal Indications:    Wound WDL ex 07/15/22 1100   Dressing Appearance Dry;Intact 07/15/22 1100   Drainage Amount Moderate 07/15/22 1100   Drainage Characteristics/Odor Serosanguineous 07/15/22 1100   Appearance Pink 07/15/22 1100   Red (%), Wound Tissue Color 100 % 07/15/22 1100   Periwound Area Dry;Intact 07/15/22 1100   Wound Edges Defined;Callused 07/15/22 1100   Wound Length (cm) 3.4 cm 07/15/22 1100   Wound Width (cm) 4.2 cm 07/15/22 1100   Wound Surface Area (cm^2) 14.28 cm^2 07/15/22 1100   Care Cleansed with:;Antimicrobial agent;Sterile normal saline 07/15/22 1100   Dressing Changed 07/15/22 1100   Off Loading Football dressing 07/15/22 1100   Dressing Change Due 07/22/22 07/15/22 1100       [REMOVED]      Wound 04/15/22 2230 Diabetic Ulcer Right distal;plantar Foot (Removed)   04/15/22 2230    Pre-existing: Yes   Primary Wound Type: Diabetic ulc   Side: Right   Orientation: distal;plantar   Location: Foot   Wound Number:    Ankle-Brachial Index:    Pulses:    Removal Indication and Assessment:    Wound Outcome: Healed   (Retired) Wound Type:    (Retired) Wound Length (cm):    (Retired) Wound Width (cm):    (Retired) Depth (cm):    Wound Description (Comments):    Removal Indications:    Removed 07/22/22    Wound Image   07/15/22 1100   Wound WDL WDL 07/15/22 1100   Appearance Closed/resurfaced 07/15/22 1100   Wound Length (cm) 0 cm 07/15/22 1100   Wound Width (cm) 0 cm 07/15/22 1100   Wound Depth (cm) 0 cm 07/15/22 1100   Wound Volume (cm^3) 0 cm^3 07/15/22 1100   Wound Surface Area (cm^2) 0 cm^2 07/15/22 1100   Care Cleansed with:;Antimicrobial agent;Sterile normal saline 07/15/22 1100   Dressing Changed 07/15/22 1100   Periwound Care Moisture barrier applied 07/15/22 1100   Off Loading Football dressing;Off loading shoe 07/15/22 1100    Dressing Change Due 07/22/22 07/15/22 1100       [REMOVED]      Wound 05/04/22 Diabetic Ulcer Right plantar Toe, first (Removed)   05/04/22     Pre-existing: Yes   Primary Wound Type: Diabetic ulc   Side: Right   Orientation: plantar   Location: Toe, first   Wound Number:    Ankle-Brachial Index:    Pulses:    Removal Indication and Assessment:    Wound Outcome: Healed   (Retired) Wound Type:    (Retired) Wound Length (cm):    (Retired) Wound Width (cm):    (Retired) Depth (cm):    Wound Description (Comments):    Removal Indications:    Removed 07/22/22    Wound Image   07/15/22 1100   Wound WDL ex 07/15/22 1100   Dressing Appearance Dry 07/15/22 1100   Drainage Amount None 07/15/22 1100   Appearance Red;Fibrin 07/15/22 1100   Tissue loss description Partial thickness 07/15/22 1100   Red (%), Wound Tissue Color 100 % 07/15/22 1100   Periwound Area Intact;Dry 07/15/22 1100   Wound Edges Callused 07/15/22 1100   Wound Length (cm) 0.2 cm 07/15/22 1100   Wound Width (cm) 0.2 cm 07/15/22 1100   Wound Depth (cm) 0.1 cm 07/15/22 1100   Wound Volume (cm^3) 0.004 cm^3 07/15/22 1100   Wound Surface Area (cm^2) 0.04 cm^2 07/15/22 1100   Care Cleansed with:;Antimicrobial agent;Sterile normal saline 07/15/22 1100   Dressing Changed 07/15/22 1100   Periwound Care Moisture barrier applied 07/15/22 1100   Off Loading Football dressing;Off loading shoe 07/15/22 1100   Dressing Change Due 07/22/22 07/15/22 1100           Plan:            Wound Debridement    Date/Time: 7/15/2022 11:00 AM  Performed by: Maira De Los Santos DPM  Authorized by: Maira De Los Santos DPM       Wound Details:    Location:  Left foot    Location:  Left Midfoot    Type of Debridement:  Excisional       Length (cm):  3.4       Area (sq cm):  14.28       Width (cm):  4.2       Percent Debrided (%):  100       Depth (cm):  0.4       Total Area Debrided (sq cm):  14.28    Depth of debridement:  Subcutaneous tissue    Tissue debrided:  Epidermis, Dermis, Hypergranulation  and Subcutaneous    Devitalized tissue debrided:  Callus and Fibrin    Instruments:  Curette    Additional wounds:  1    2nd Wound Details:     Location:  Right foot    Location:  Right 1st Toe    Location:  Right 1st Toe    Type of Debridement:  Excisional       Length (cm):  0.2       Area (sq cm):  0.04       Width (cm):  0.2       Percent Debrided (%):  100       Depth (cm):  0.1       Total Area Debrided (sq cm):  0.04    Depth of debridement:  Subcutaneous tissue    Tissue debrided:  Dermis, Epidermis and Subcutaneous    Devitalized tissue debrided:  Callus    Instruments:  Curette    Bleeding:  Minimal  Hemostasis Achieved: Yes    Method Used:  Pressure  Patient tolerance:  Patient tolerated the procedure well with no immediate complications        Right plantar healed and great toe plantar now only 0.2 mm covered with thin callus debrided to red beefy area. Left plantar foot about same size and is protruding beyond edges, debrided and silver nitrate used. Will try and do rearranging with schedule to fit nurse visit in as no word from [facility] despite calling them.      Orders Placed This Encounter   Procedures    Debridement     This order was created via procedure documentation     Standing Status:   Standing     Number of Occurrences:   1    Change dressing     Cover left periwound with custom pad with size of wound removed. Jolynn to all wound beds with 2 long Javan foams in perpendicular fashion over wounds Football to both feet secured with Coban.        Follow up in about 1 week (around 7/22/2022).

## 2022-07-19 ENCOUNTER — OFFICE VISIT (OUTPATIENT)
Dept: FAMILY MEDICINE | Facility: CLINIC | Age: 52
End: 2022-07-19
Payer: MEDICAID

## 2022-07-19 VITALS
HEIGHT: 66 IN | HEART RATE: 92 BPM | TEMPERATURE: 98 F | DIASTOLIC BLOOD PRESSURE: 58 MMHG | BODY MASS INDEX: 37.03 KG/M2 | OXYGEN SATURATION: 97 % | WEIGHT: 230.38 LBS | SYSTOLIC BLOOD PRESSURE: 108 MMHG

## 2022-07-19 DIAGNOSIS — E11.621 TYPE 2 DIABETES MELLITUS WITH FOOT ULCER, WITHOUT LONG-TERM CURRENT USE OF INSULIN: Primary | ICD-10-CM

## 2022-07-19 DIAGNOSIS — J30.89 NON-SEASONAL ALLERGIC RHINITIS, UNSPECIFIED TRIGGER: ICD-10-CM

## 2022-07-19 DIAGNOSIS — J44.9 CHRONIC OBSTRUCTIVE PULMONARY DISEASE, UNSPECIFIED COPD TYPE: ICD-10-CM

## 2022-07-19 DIAGNOSIS — M54.42 CHRONIC BILATERAL LOW BACK PAIN WITH LEFT-SIDED SCIATICA: ICD-10-CM

## 2022-07-19 DIAGNOSIS — I10 ESSENTIAL HYPERTENSION: ICD-10-CM

## 2022-07-19 DIAGNOSIS — E11.42 TYPE 2 DIABETES MELLITUS WITH DIABETIC POLYNEUROPATHY, WITHOUT LONG-TERM CURRENT USE OF INSULIN: ICD-10-CM

## 2022-07-19 DIAGNOSIS — G89.29 CHRONIC BILATERAL LOW BACK PAIN WITH LEFT-SIDED SCIATICA: ICD-10-CM

## 2022-07-19 DIAGNOSIS — L97.509 TYPE 2 DIABETES MELLITUS WITH FOOT ULCER, WITHOUT LONG-TERM CURRENT USE OF INSULIN: Primary | ICD-10-CM

## 2022-07-19 DIAGNOSIS — F31.9 BIPOLAR 1 DISORDER: ICD-10-CM

## 2022-07-19 PROCEDURE — 99214 OFFICE O/P EST MOD 30 MIN: CPT | Mod: S$PBB,,, | Performed by: INTERNAL MEDICINE

## 2022-07-19 PROCEDURE — 3078F PR MOST RECENT DIASTOLIC BLOOD PRESSURE < 80 MM HG: ICD-10-PCS | Mod: CPTII,,, | Performed by: INTERNAL MEDICINE

## 2022-07-19 PROCEDURE — 1159F PR MEDICATION LIST DOCUMENTED IN MEDICAL RECORD: ICD-10-PCS | Mod: CPTII,,, | Performed by: INTERNAL MEDICINE

## 2022-07-19 PROCEDURE — 4010F ACE/ARB THERAPY RXD/TAKEN: CPT | Mod: CPTII,,, | Performed by: INTERNAL MEDICINE

## 2022-07-19 PROCEDURE — 4010F PR ACE/ARB THEARPY RXD/TAKEN: ICD-10-PCS | Mod: CPTII,,, | Performed by: INTERNAL MEDICINE

## 2022-07-19 PROCEDURE — 3008F PR BODY MASS INDEX (BMI) DOCUMENTED: ICD-10-PCS | Mod: CPTII,,, | Performed by: INTERNAL MEDICINE

## 2022-07-19 PROCEDURE — 99214 PR OFFICE/OUTPT VISIT, EST, LEVL IV, 30-39 MIN: ICD-10-PCS | Mod: S$PBB,,, | Performed by: INTERNAL MEDICINE

## 2022-07-19 PROCEDURE — 99999 PR PBB SHADOW E&M-EST. PATIENT-LVL III: ICD-10-PCS | Mod: PBBFAC,,, | Performed by: INTERNAL MEDICINE

## 2022-07-19 PROCEDURE — 1160F RVW MEDS BY RX/DR IN RCRD: CPT | Mod: CPTII,,, | Performed by: INTERNAL MEDICINE

## 2022-07-19 PROCEDURE — 1159F MED LIST DOCD IN RCRD: CPT | Mod: CPTII,,, | Performed by: INTERNAL MEDICINE

## 2022-07-19 PROCEDURE — 3008F BODY MASS INDEX DOCD: CPT | Mod: CPTII,,, | Performed by: INTERNAL MEDICINE

## 2022-07-19 PROCEDURE — 3051F HG A1C>EQUAL 7.0%<8.0%: CPT | Mod: CPTII,,, | Performed by: INTERNAL MEDICINE

## 2022-07-19 PROCEDURE — 1160F PR REVIEW ALL MEDS BY PRESCRIBER/CLIN PHARMACIST DOCUMENTED: ICD-10-PCS | Mod: CPTII,,, | Performed by: INTERNAL MEDICINE

## 2022-07-19 PROCEDURE — 3078F DIAST BP <80 MM HG: CPT | Mod: CPTII,,, | Performed by: INTERNAL MEDICINE

## 2022-07-19 PROCEDURE — 99213 OFFICE O/P EST LOW 20 MIN: CPT | Mod: PBBFAC,PN | Performed by: INTERNAL MEDICINE

## 2022-07-19 PROCEDURE — 99999 PR PBB SHADOW E&M-EST. PATIENT-LVL III: CPT | Mod: PBBFAC,,, | Performed by: INTERNAL MEDICINE

## 2022-07-19 PROCEDURE — 3074F SYST BP LT 130 MM HG: CPT | Mod: CPTII,,, | Performed by: INTERNAL MEDICINE

## 2022-07-19 PROCEDURE — 3074F PR MOST RECENT SYSTOLIC BLOOD PRESSURE < 130 MM HG: ICD-10-PCS | Mod: CPTII,,, | Performed by: INTERNAL MEDICINE

## 2022-07-19 PROCEDURE — 3051F PR MOST RECENT HEMOGLOBIN A1C LEVEL 7.0 - < 8.0%: ICD-10-PCS | Mod: CPTII,,, | Performed by: INTERNAL MEDICINE

## 2022-07-19 RX ORDER — POTASSIUM CHLORIDE 750 MG/1
10 CAPSULE, EXTENDED RELEASE ORAL DAILY
COMMUNITY
Start: 2022-06-17 | End: 2022-07-19 | Stop reason: SDUPTHER

## 2022-07-19 RX ORDER — HYDROXYZINE HYDROCHLORIDE 50 MG/1
50 TABLET, FILM COATED ORAL 4 TIMES DAILY PRN
Status: ON HOLD | COMMUNITY
Start: 2022-07-13 | End: 2023-01-17 | Stop reason: SDUPTHER

## 2022-07-19 RX ORDER — DUPILUMAB 300 MG/2ML
INJECTION, SOLUTION SUBCUTANEOUS
COMMUNITY
Start: 2022-07-12

## 2022-07-19 RX ORDER — AMMONIUM LACTATE 12 G/100G
LOTION TOPICAL
COMMUNITY
Start: 2022-07-03 | End: 2023-02-06 | Stop reason: SDUPTHER

## 2022-07-19 RX ORDER — LANCETS
EACH MISCELLANEOUS
Qty: 100 EACH | Refills: 1 | Status: SHIPPED | OUTPATIENT
Start: 2022-07-19 | End: 2022-12-22 | Stop reason: CLARIF

## 2022-07-19 RX ORDER — LORATADINE 10 MG/1
10 TABLET ORAL DAILY
Qty: 90 TABLET | Refills: 3 | Status: SHIPPED | OUTPATIENT
Start: 2022-07-19 | End: 2023-07-30

## 2022-07-19 RX ORDER — METHOCARBAMOL 500 MG/1
500 TABLET, FILM COATED ORAL EVERY 8 HOURS PRN
Qty: 80 TABLET | Refills: 3 | Status: SHIPPED | OUTPATIENT
Start: 2022-07-19 | End: 2022-07-29

## 2022-07-19 RX ORDER — INSULIN PUMP SYRINGE, 3 ML
EACH MISCELLANEOUS
Qty: 1 EACH | Refills: 0 | Status: SHIPPED | OUTPATIENT
Start: 2022-07-19 | End: 2022-12-22 | Stop reason: CLARIF

## 2022-07-19 RX ORDER — BUMETANIDE 1 MG/1
1 TABLET ORAL DAILY
COMMUNITY
Start: 2022-06-17 | End: 2022-07-19 | Stop reason: SDUPTHER

## 2022-07-19 RX ORDER — BUMETANIDE 1 MG/1
1 TABLET ORAL DAILY
Qty: 30 TABLET | Refills: 5 | Status: SHIPPED | OUTPATIENT
Start: 2022-07-19 | End: 2023-02-06 | Stop reason: SDUPTHER

## 2022-07-19 RX ORDER — CARBAMAZEPINE 200 MG/1
400 TABLET ORAL 2 TIMES DAILY
COMMUNITY
Start: 2022-07-13 | End: 2023-01-09

## 2022-07-19 RX ORDER — POTASSIUM CHLORIDE 750 MG/1
10 CAPSULE, EXTENDED RELEASE ORAL DAILY
Qty: 30 CAPSULE | Refills: 5 | Status: SHIPPED | OUTPATIENT
Start: 2022-07-19 | End: 2023-01-09
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
## 2022-07-22 ENCOUNTER — HOSPITAL ENCOUNTER (OUTPATIENT)
Dept: WOUND CARE | Facility: HOSPITAL | Age: 52
Discharge: HOME | End: 2022-07-22
Attending: PODIATRIST
Payer: MEDICAID

## 2022-07-22 VITALS — DIASTOLIC BLOOD PRESSURE: 65 MMHG | SYSTOLIC BLOOD PRESSURE: 143 MMHG | HEART RATE: 86 BPM | TEMPERATURE: 98 F

## 2022-07-22 DIAGNOSIS — L97.509 DIABETIC FOOT ULCER ASSOCIATED WITH TYPE 2 DIABETES MELLITUS: Primary | ICD-10-CM

## 2022-07-22 DIAGNOSIS — E11.621 DIABETIC FOOT ULCER ASSOCIATED WITH TYPE 2 DIABETES MELLITUS: Primary | ICD-10-CM

## 2022-07-22 DIAGNOSIS — M14.672 CHARCOT'S JOINT OF LEFT FOOT: ICD-10-CM

## 2022-07-22 DIAGNOSIS — L97.423 LEFT MIDFOOT ULCER, WITH NECROSIS OF MUSCLE: ICD-10-CM

## 2022-07-22 PROCEDURE — 11042 DBRDMT SUBQ TIS 1ST 20SQCM/<: CPT | Mod: ,,, | Performed by: PODIATRIST

## 2022-07-22 PROCEDURE — 99499 NO LOS: ICD-10-PCS | Mod: ,,, | Performed by: PODIATRIST

## 2022-07-22 PROCEDURE — 99499 UNLISTED E&M SERVICE: CPT | Mod: ,,, | Performed by: PODIATRIST

## 2022-07-22 PROCEDURE — 11042 WOUND DEBRIDEMENT: ICD-10-PCS | Mod: ,,, | Performed by: PODIATRIST

## 2022-07-22 PROCEDURE — 11042 DBRDMT SUBQ TIS 1ST 20SQCM/<: CPT | Performed by: PODIATRIST

## 2022-07-22 NOTE — PROGRESS NOTES
"[facility]                Progress Note    Subjective:       Patient ID: Audrey Natarajan is a 52 y.o. female.    Chief Complaint: Wound Check    Follow up wound care visit. Patient ambulated to exam room without assistance prescribed Darco shoe on Right Foot and Air Cast Boot on Left Foot, friend at her side. She c/o pain to Left Foot rating as 6/10 at present and states "it throbs like a pulse at night",  Dressing to Right Foot intact with no drainage. Dressing to Left Foot is not intact, patient reports "cutting with scissors" vertically across top of foot, there is a large amount of serosanguineous, drainage noted.       Review of Systems   Constitutional: Positive for activity change. Negative for appetite change, chills, fatigue and fever.   Respiratory: Negative for cough and shortness of breath.    Cardiovascular: Positive for leg swelling. Negative for chest pain.   Gastrointestinal: Negative for diarrhea, nausea and vomiting.   Musculoskeletal: Positive for arthralgias and myalgias.   Skin: Positive for wound.   Neurological: Positive for numbness. Negative for weakness.        + paresthesia    Psychiatric/Behavioral: The patient is nervous/anxious.          Objective:        Physical Exam  Nursing note reviewed.   Constitutional:       General: She is not in acute distress.     Appearance: She is not toxic-appearing or diaphoretic.   Cardiovascular:      Pulses:           Dorsalis pedis pulses are 1+ on the right side and 1+ on the left side.        Posterior tibial pulses are 2+ on the right side and 2+ on the left side.   Pulmonary:      Effort: No respiratory distress.   Musculoskeletal:      Right lower leg: Edema present.      Left lower leg: Edema present.      Right ankle: Swelling present. No lateral malleolus, medial malleolus, AITF ligament, CF ligament or posterior TF ligament tenderness. Decreased range of motion.      Right Achilles Tendon: " No defects. Paniagua's test negative.      Left ankle: Swelling present. No lateral malleolus, medial malleolus, AITF ligament, CF ligament, posterior TF ligament or proximal fibula tenderness. Decreased range of motion.      Left Achilles Tendon: No defects. Paniagua's test negative.      Right foot: Swelling and tenderness present.      Left foot: Swelling, Charcot foot and tenderness present.      Comments: There is equinus deformity bilateral with decreased dorsiflexion at the ankle joint bilateral    Decreased first MPJ range of motion both weightbearing and nonweightbearing, no crepitus observed the first MP joint, + dorsal flag sign. Mild  bunion deformity is observed .    Patient has hammertoes of digits 2-5 bilateral partially reducible without symptom today.     Skin:     General: Skin is warm and dry.      Coloration: Skin is not pale.      Findings: Erythema and wound (see below) present. No laceration.      Nails: There is no clubbing.   Neurological:      Sensory: No sensory deficit.      Motor: No tremor, atrophy or abnormal muscle tone.      Deep Tendon Reflexes: Reflexes are normal and symmetric.      Comments: Paresthesias, and hyperesthesia bilateral feet at toes with no clearly identified trigger or source.    [place]-Gilberto 5.07 monofilament is intact bilateral feet. Sharp/dull sensation is also intact Bilateral feet.   Psychiatric:         Attention and Perception: She is attentive.         Mood and Affect: Mood is not anxious. Affect is not inappropriate.         Speech: She is communicative. Speech is not slurred.         Behavior: Behavior is not combative.           Vitals:    07/22/22 0949   BP: (!) 143/65   Pulse: 86   Temp: 97.5 °F (36.4 °C)       Assessment:           ICD-10-CM ICD-9-CM   1. Diabetic foot ulcer associated with type 2 diabetes mellitus  E11.621 250.80    L97.509 707.15   2. Left midfoot ulcer, with necrosis of muscle  L97.423 707.14     728.89   3. Charcot's joint of  left foot  M14.672 094.0     713.5            Wound 04/15/22 2230 Diabetic Ulcer Left medial;plantar Foot (Active)   04/15/22 2230    Pre-existing: Yes   Primary Wound Type: Diabetic ulc   Side: Left   Orientation: medial;plantar   Location: Foot   Wound Number:    Ankle-Brachial Index:    Pulses:    Removal Indication and Assessment:    Wound Outcome:    (Retired) Wound Type:    (Retired) Wound Length (cm):    (Retired) Wound Width (cm):    (Retired) Depth (cm):    Wound Description (Comments):    Removal Indications:    Wound Image    07/22/22 0900   Wound WDL ex 07/22/22 0900   Dressing Appearance Intact;Moist drainage 07/22/22 0900   Drainage Amount Large 07/22/22 0900   Drainage Characteristics/Odor Serosanguineous 07/22/22 0900   Appearance Red;Moist 07/22/22 0900   Tissue loss description Partial thickness 07/22/22 0900   Red (%), Wound Tissue Color 100 % 07/22/22 0900   Periwound Area Pale white;Dry 07/22/22 0900   Wound Edges Defined;Callused 07/22/22 0900   Wound Length (cm) 2.5 cm 07/22/22 0900   Wound Width (cm) 3.7 cm 07/22/22 0900   Wound Depth (cm) 0.1 cm 07/22/22 0900   Wound Volume (cm^3) 0.925 cm^3 07/22/22 0900   Wound Surface Area (cm^2) 9.25 cm^2 07/22/22 0900   Care Cleansed with:;Antimicrobial agent;Sterile normal saline 07/22/22 0900   Dressing Changed 07/22/22 0900   Periwound Care Skin barrier film applied 07/22/22 0900   Off Loading Football dressing;Off loading shoe 07/22/22 0900   Dressing Change Due 07/29/22 07/22/22 0900       [REMOVED]      Wound 04/15/22 2230 Diabetic Ulcer Right distal;plantar Foot (Removed)   04/15/22 2230    Pre-existing: Yes   Primary Wound Type: Diabetic ulc   Side: Right   Orientation: distal;plantar   Location: Foot   Wound Number:    Ankle-Brachial Index:    Pulses:    Removal Indication and Assessment:    Wound Outcome: Healed   (Retired) Wound Type:    (Retired) Wound Length (cm):    (Retired) Wound Width (cm):    (Retired) Depth (cm):    Wound Description  (Comments):    Removal Indications:    Removed 07/22/22    Wound Image   07/22/22 0900   Wound WDL WDL 07/22/22 0900   Dressing Appearance Intact;Dry 07/22/22 0900   Drainage Amount None 07/22/22 0900   Appearance Closed/resurfaced 07/22/22 0900   Tissue loss description Not applicable 07/22/22 0900   Wound Length (cm) 0 cm 07/22/22 0900   Wound Width (cm) 0 cm 07/22/22 0900   Wound Depth (cm) 0 cm 07/22/22 0900   Wound Volume (cm^3) 0 cm^3 07/22/22 0900   Wound Surface Area (cm^2) 0 cm^2 07/22/22 0900   Tunneling (depth (cm)/location) 0 07/22/22 0900   Undermining (depth (cm)/location) 0 07/22/22 0900   Care Cleansed with:;Antimicrobial agent;Sterile normal saline 07/22/22 0900   Dressing Removed 07/22/22 0900       [REMOVED]      Wound 05/04/22 Diabetic Ulcer Right plantar Toe, first (Removed)   05/04/22     Pre-existing: Yes   Primary Wound Type: Diabetic ulc   Side: Right   Orientation: plantar   Location: Toe, first   Wound Number:    Ankle-Brachial Index:    Pulses:    Removal Indication and Assessment:    Wound Outcome: Healed   (Retired) Wound Type:    (Retired) Wound Length (cm):    (Retired) Wound Width (cm):    (Retired) Depth (cm):    Wound Description (Comments):    Removal Indications:    Removed 07/22/22    Wound Image    07/22/22 0900   Wound WDL WDL 07/22/22 0900   Dressing Appearance Intact;Dry 07/22/22 0900   Drainage Amount None 07/22/22 0900   Appearance Closed/resurfaced 07/22/22 0900   Tissue loss description Not applicable 07/22/22 0900   Wound Length (cm) 0 cm 07/22/22 0900   Wound Width (cm) 0 cm 07/22/22 0900   Wound Depth (cm) 0 cm 07/22/22 0900   Wound Volume (cm^3) 0 cm^3 07/22/22 0900   Wound Surface Area (cm^2) 0 cm^2 07/22/22 0900   Tunneling (depth (cm)/location) 0 07/22/22 0900   Undermining (depth (cm)/location) 0 07/22/22 0900   Care Cleansed with:;Antimicrobial agent;Sterile normal saline 07/22/22 0900   Dressing Removed 07/22/22 0900           Plan:            Right 1st Medial  toe appears healed. Left Plantar Foot measuring smaller in length and width    Patient advised to continue wearing Darco shoe on Right Foot until further notice from MD. Wound care done as per order. Patient to return to clinic for Nurse Visit on Wednesday 07/28 and MD in 1 week.    Wound Debridement    Date/Time: 7/22/2022 9:40 AM  Performed by: Maira De Los Santos DPM  Authorized by: Maira De Los Santos DPM       Wound Details:    Location:  Left foot    Location:  Left Midfoot    Type of Debridement:  Excisional       Length (cm):  2.5       Area (sq cm):  9.25       Width (cm):  3.7       Percent Debrided (%):  100       Depth (cm):  0.1       Total Area Debrided (sq cm):  9.25    Depth of debridement:  Subcutaneous tissue    Tissue debrided:  Dermis, Epidermis, Subcutaneous and Hypergranulation    Devitalized tissue debrided:  Callus, Fibrin and Biofilm    Instruments:  Curette    Bleeding:  Minimal  Hemostasis Achieved: Yes    Method Used:  Pressure  Patient tolerance:  Patient tolerated the procedure well with no immediate complications        Orders Placed This Encounter   Procedures    Debridement     This order was created via procedure documentation     Standing Status:   Standing     Number of Occurrences:   1    Change dressing     Clean with NS.   Left Foot: Custom pad with size of wound removed. Jolynn to wound bed. Alex (long x 2 laid perpendicular). Football (cast padding x 3), Calamine Coflex (pink layer touching skin then football). Air Cast Boot.  Right Foot: Darco.        Follow up in about 5 days (around 7/27/2022) for wound care.

## 2022-07-27 ENCOUNTER — HOSPITAL ENCOUNTER (OUTPATIENT)
Dept: WOUND CARE | Facility: HOSPITAL | Age: 52
Discharge: HOME | End: 2022-07-27
Attending: FAMILY MEDICINE
Payer: MEDICAID

## 2022-07-27 VITALS
HEART RATE: 89 BPM | TEMPERATURE: 98 F | SYSTOLIC BLOOD PRESSURE: 136 MMHG | DIASTOLIC BLOOD PRESSURE: 60 MMHG | BODY MASS INDEX: 37.03 KG/M2 | HEIGHT: 66 IN | WEIGHT: 230.38 LBS

## 2022-07-27 DIAGNOSIS — E11.621 DIABETIC FOOT ULCER ASSOCIATED WITH TYPE 2 DIABETES MELLITUS: Primary | ICD-10-CM

## 2022-07-27 DIAGNOSIS — M14.672 CHARCOT'S JOINT OF LEFT FOOT: ICD-10-CM

## 2022-07-27 DIAGNOSIS — L97.423 LEFT MIDFOOT ULCER, WITH NECROSIS OF MUSCLE: ICD-10-CM

## 2022-07-27 DIAGNOSIS — L97.509 DIABETIC FOOT ULCER ASSOCIATED WITH TYPE 2 DIABETES MELLITUS: Primary | ICD-10-CM

## 2022-07-27 PROCEDURE — 29581 APPL MULTLAYER CMPRN SYS LEG: CPT

## 2022-07-27 NOTE — PROGRESS NOTES
"[facility]  [address]  [address]  Nurse Visit    Subjective:       Patient seen in clinic today.  Dressing changed as ordered. Patient cut open Coflex above foot and removed dressing from below knee to ankle, "it was throbbing too much. I waited 4 hours for it to stop, before I decided to cut it." She c/o pain to her Right Lower Leg and Foot rating as 7/10 at present, she declines to go to the ER at this time.       Assessment:          Wound 04/15/22 2230 Diabetic Ulcer Left medial;plantar Foot (Active)   04/15/22 2230    Pre-existing: Yes   Primary Wound Type: Diabetic ulc   Side: Left   Orientation: medial;plantar   Location: Foot   Wound Number:    Ankle-Brachial Index:    Pulses:    Removal Indication and Assessment:    Wound Outcome:    (Retired) Wound Type:    (Retired) Wound Length (cm):    (Retired) Wound Width (cm):    (Retired) Depth (cm):    Wound Description (Comments):    Removal Indications:    Wound WDL ex 07/27/22 1500   Dressing Appearance Moist drainage 07/27/22 1500   Drainage Amount Large 07/27/22 1500   Drainage Characteristics/Odor Serosanguineous 07/27/22 1500   Appearance Red;Moist 07/27/22 1500   Tissue loss description Partial thickness 07/27/22 1500   Periwound Area Macerated 07/27/22 1500   Wound Edges Defined 07/27/22 1500   Care Cleansed with:;Antimicrobial agent;Sterile normal saline 07/27/22 1500   Dressing Changed 07/27/22 1500   Periwound Care Moisture barrier applied 07/27/22 1500   Compression Two layer compression 07/27/22 1500   Off Loading Football dressing;Off loading shoe 07/27/22 1500   Dressing Change Due 07/29/22 07/27/22 1500           Plan:     No orders of the defined types were placed in this encounter.          Follow up in about 2 days (around 7/29/2022).        "

## 2022-07-28 DIAGNOSIS — E11.42 TYPE 2 DIABETES MELLITUS WITH DIABETIC POLYNEUROPATHY, WITHOUT LONG-TERM CURRENT USE OF INSULIN: ICD-10-CM

## 2022-07-28 RX ORDER — GABAPENTIN 300 MG/1
600 CAPSULE ORAL 2 TIMES DAILY
Qty: 120 CAPSULE | Refills: 2 | Status: SHIPPED | OUTPATIENT
Start: 2022-07-28 | End: 2022-11-15

## 2022-07-28 RX ORDER — TRAMADOL HYDROCHLORIDE 50 MG/1
50 TABLET ORAL EVERY 8 HOURS PRN
Qty: 28 TABLET | Refills: 0 | Status: CANCELLED | OUTPATIENT
Start: 2022-07-28 | End: 2022-08-07

## 2022-07-28 NOTE — TELEPHONE ENCOUNTER
No new care gaps identified.  [facility] Embedded Care Gaps. Reference number: [id number]. 7/28/2022   9:12:44 AM CDT

## 2022-07-28 NOTE — TELEPHONE ENCOUNTER
----- Message from Sheila Powell sent at 7/28/2022  8:57 AM CDT -----  Contact: Patient  [phone]  Type: RX Refill Request    Who Called: Patient     Have you contacted your pharmacy: Yes. Wasn't sent in to pharmacy on 07-19-22, at time of visit.     Refill or New Rx: Refill     RX Name and Strength: traMADoL tablet 50 mg, gabapentin (NEURONTIN) 300 MG capsule     Is this a 30 day or 90 day RX: 90 day    Preferred Pharmacy with phone number: .  [id number] [address] AT [address] [address]  Phone: [phone] Fax: [phone]    Local or Mail Order: Local    Would the patient rather a call back or a response via [facility]? Call back    Best Call Back Number: [phone]    Additional Information: Patient states that she's out of the medications. Please send in.

## 2022-07-29 ENCOUNTER — HOSPITAL ENCOUNTER (OUTPATIENT)
Dept: WOUND CARE | Facility: HOSPITAL | Age: 52
Discharge: HOME | End: 2022-07-29
Attending: PODIATRIST
Payer: MEDICAID

## 2022-07-29 VITALS — SYSTOLIC BLOOD PRESSURE: 128 MMHG | TEMPERATURE: 98 F | HEART RATE: 82 BPM | DIASTOLIC BLOOD PRESSURE: 60 MMHG

## 2022-07-29 DIAGNOSIS — E11.621 DIABETIC FOOT ULCER ASSOCIATED WITH TYPE 2 DIABETES MELLITUS: Primary | ICD-10-CM

## 2022-07-29 DIAGNOSIS — L97.509 DIABETIC FOOT ULCER ASSOCIATED WITH TYPE 2 DIABETES MELLITUS: Primary | ICD-10-CM

## 2022-07-29 DIAGNOSIS — L97.423 LEFT MIDFOOT ULCER, WITH NECROSIS OF MUSCLE: ICD-10-CM

## 2022-07-29 DIAGNOSIS — M14.672 CHARCOT'S JOINT OF LEFT FOOT: ICD-10-CM

## 2022-07-29 PROCEDURE — 99499 NO LOS: ICD-10-PCS | Mod: ,,, | Performed by: PODIATRIST

## 2022-07-29 PROCEDURE — 11042 DBRDMT SUBQ TIS 1ST 20SQCM/<: CPT

## 2022-07-29 PROCEDURE — 11042 DBRDMT SUBQ TIS 1ST 20SQCM/<: CPT | Performed by: PODIATRIST

## 2022-07-29 PROCEDURE — 99499 UNLISTED E&M SERVICE: CPT | Mod: ,,, | Performed by: PODIATRIST

## 2022-07-29 PROCEDURE — 11042 DBRDMT SUBQ TIS 1ST 20SQCM/<: CPT | Mod: ,,, | Performed by: PODIATRIST

## 2022-07-29 PROCEDURE — 11042 WOUND DEBRIDEMENT: ICD-10-PCS | Mod: ,,, | Performed by: PODIATRIST

## 2022-07-29 NOTE — PROGRESS NOTES
"[facility]                Progress Note    Subjective:       Patient ID: Audrey Natarajan is a 52 y.o. female.    Chief Complaint: Wound Check    Follow up wound care visit. Patient ambulated to exam room without assistance prescribed Darco shoe on Right Foot and Air Cast Boot on Left Foot, friend at her side. She c/o pain to Left Foot rating as 7/10 at present. Dressing to Left Foot not intact, patient reports "cutting with scissors" vertically across top of foot and removed coflex.       Review of Systems   Constitutional: Positive for activity change. Negative for appetite change, chills, fatigue and fever.   Respiratory: Negative for cough and shortness of breath.    Cardiovascular: Positive for leg swelling. Negative for chest pain.   Gastrointestinal: Negative for diarrhea, nausea and vomiting.   Musculoskeletal: Positive for arthralgias and myalgias.   Skin: Positive for wound.   Neurological: Positive for numbness. Negative for weakness.        + paresthesia    Psychiatric/Behavioral: The patient is nervous/anxious.          Objective:        Physical Exam  Nursing note reviewed.   Constitutional:       General: She is not in acute distress.     Appearance: She is not toxic-appearing or diaphoretic.   Cardiovascular:      Pulses:           Dorsalis pedis pulses are 1+ on the right side and 1+ on the left side.        Posterior tibial pulses are 2+ on the right side and 2+ on the left side.   Pulmonary:      Effort: No respiratory distress.   Musculoskeletal:      Right lower leg: Edema present.      Left lower leg: Edema present.      Right ankle: Swelling present. No lateral malleolus, medial malleolus, AITF ligament, CF ligament or posterior TF ligament tenderness. Decreased range of motion.      Right Achilles Tendon: No defects. Paniagua's test negative.      Left ankle: Swelling present. No lateral malleolus, medial malleolus, AITF ligament, CF " ligament, posterior TF ligament or proximal fibula tenderness. Decreased range of motion.      Left Achilles Tendon: No defects. Paniagua's test negative.      Right foot: Swelling and tenderness present.      Left foot: Swelling, Charcot foot and tenderness present.      Comments: There is equinus deformity bilateral with decreased dorsiflexion at the ankle joint bilateral    Decreased first MPJ range of motion both weightbearing and nonweightbearing, no crepitus observed the first MP joint, + dorsal flag sign. Mild  bunion deformity is observed .    Patient has hammertoes of digits 2-5 bilateral partially reducible without symptom today.     Skin:     General: Skin is warm and dry.      Coloration: Skin is not pale.      Findings: Erythema and wound (see below) present. No laceration.      Nails: There is no clubbing.   Neurological:      Sensory: No sensory deficit.      Motor: No tremor, atrophy or abnormal muscle tone.      Deep Tendon Reflexes: Reflexes are normal and symmetric.      Comments: Paresthesias, and hyperesthesia bilateral feet at toes with no clearly identified trigger or source.    [place]-Gilberto 5.07 monofilament is intact bilateral feet. Sharp/dull sensation is also intact Bilateral feet.   Psychiatric:         Attention and Perception: She is attentive.         Mood and Affect: Mood is not anxious. Affect is not inappropriate.         Speech: She is communicative. Speech is not slurred.         Behavior: Behavior is not combative.           Vitals:    07/29/22 1057   BP: 128/60   Pulse: 82   Temp: 97.5 °F (36.4 °C)       Assessment:           ICD-10-CM ICD-9-CM   1. Diabetic foot ulcer associated with type 2 diabetes mellitus  E11.621 250.80    L97.509 707.15   2. Charcot's joint of left foot  M14.672 094.0     713.5   3. Left midfoot ulcer, with necrosis of muscle  L97.423 707.14     728.89            Wound 04/15/22 2230 Diabetic Ulcer Left medial;plantar Foot (Active)   04/15/22 2230     Pre-existing: Yes   Primary Wound Type: Diabetic ulc   Side: Left   Orientation: medial;plantar   Location: Foot   Wound Number:    Ankle-Brachial Index:    Pulses:    Removal Indication and Assessment:    Wound Outcome:    (Retired) Wound Type:    (Retired) Wound Length (cm):    (Retired) Wound Width (cm):    (Retired) Depth (cm):    Wound Description (Comments):    Removal Indications:    Wound Image   07/29/22 1000   Wound WDL ex 07/29/22 1000   Dressing Appearance Intact;Moist drainage 07/29/22 1000   Drainage Amount Large 07/29/22 1000   Drainage Characteristics/Odor Serosanguineous 07/29/22 1000   Appearance Red;Moist 07/29/22 1000   Tissue loss description Partial thickness 07/29/22 1000   Black (%), Wound Tissue Color 0 % 07/29/22 1000   Red (%), Wound Tissue Color 100 % 07/29/22 1000   Yellow (%), Wound Tissue Color 0 % 07/29/22 1000   Periwound Area Macerated 07/29/22 1000   Wound Edges Defined 07/29/22 1000   Wound Length (cm) 2.2 cm 07/29/22 1000   Wound Width (cm) 3.2 cm 07/29/22 1000   Wound Depth (cm) 0.1 cm 07/29/22 1000   Wound Volume (cm^3) 0.704 cm^3 07/29/22 1000   Wound Surface Area (cm^2) 7.04 cm^2 07/29/22 1000   Tunneling (depth (cm)/location) 0 07/29/22 1000   Undermining (depth (cm)/location) 0 07/29/22 1000   Care Cleansed with:;Antimicrobial agent;Sterile normal saline 07/29/22 1000   Dressing Changed 07/29/22 1000           Plan:            Left Plantar Foot measuring smaller in length and width    Wound Debridement    Date/Time: 7/29/2022 10:30 AM  Performed by: Maira De Los Santos DPM  Authorized by: Maira De Los Santos DPM     Consent Done?:  Yes (Written)    Wound Details:    Location:  Left foot    Location:  Left Plantar    Type of Debridement:  Excisional       Length (cm):  2.2       Area (sq cm):  7.04       Width (cm):  3.2       Percent Debrided (%):  100       Depth (cm):  0.1       Total Area Debrided (sq cm):  7.04    Depth of debridement:  Subcutaneous tissue    Tissue debrided:   Epidermis, Hypergranulation, Dermis and Subcutaneous    Devitalized tissue debrided:  Callus and Fibrin    Instruments:  Curette    Bleeding:  Minimal  Hemostasis Achieved: Yes    Method Used:  Silver Nitrate  Patient tolerance:  Patient tolerated the procedure well with no immediate complications       Wound care done as per order. Patient to return to clinic for Nurse Visit on Wednesday 08/03 and MD in 1 week.        Orders Placed This Encounter   Procedures    Debridement     This order was created via procedure documentation     Standing Status:   Standing     Number of Occurrences:   1    Change dressing     Clean with NS.   Left Foot: Custom pad with size of wound removed. Jolynn to wound bed. Alex (long x 2 laid perpendicular). Football (cast padding x 3), Tubigrip, single layer, size E (taped beneath foot with Medipore Tape). Air Cast Boot.   Right Foot: Darco.        Follow up in about 1 week (around 8/5/2022) for wound care.

## 2022-08-03 ENCOUNTER — HOSPITAL ENCOUNTER (OUTPATIENT)
Dept: WOUND CARE | Facility: HOSPITAL | Age: 52
Discharge: HOME | End: 2022-08-03
Attending: FAMILY MEDICINE
Payer: MEDICAID

## 2022-08-03 VITALS — SYSTOLIC BLOOD PRESSURE: 131 MMHG | HEART RATE: 90 BPM | DIASTOLIC BLOOD PRESSURE: 60 MMHG | TEMPERATURE: 98 F

## 2022-08-03 DIAGNOSIS — L97.423 LEFT MIDFOOT ULCER, WITH NECROSIS OF MUSCLE: ICD-10-CM

## 2022-08-03 DIAGNOSIS — M14.672 CHARCOT'S JOINT OF LEFT FOOT: ICD-10-CM

## 2022-08-03 DIAGNOSIS — E11.621 DIABETIC FOOT ULCER ASSOCIATED WITH TYPE 2 DIABETES MELLITUS: Primary | ICD-10-CM

## 2022-08-03 DIAGNOSIS — L97.509 DIABETIC FOOT ULCER ASSOCIATED WITH TYPE 2 DIABETES MELLITUS: Primary | ICD-10-CM

## 2022-08-03 PROCEDURE — 99212 OFFICE O/P EST SF 10 MIN: CPT

## 2022-08-03 NOTE — PROGRESS NOTES
[facility]  [address]  [address]  Nurse Visit    Subjective:       Patient seen in clinic today.  Dressing changed as ordered.      Assessment:          Wound 04/15/22 2230 Diabetic Ulcer Left medial;plantar Foot (Active)   04/15/22 2230    Pre-existing: Yes   Primary Wound Type: Diabetic ulc   Side: Left   Orientation: medial;plantar   Location: Foot   Wound Number:    Ankle-Brachial Index:    Pulses:    Removal Indication and Assessment:    Wound Outcome:    (Retired) Wound Type:    (Retired) Wound Length (cm):    (Retired) Wound Width (cm):    (Retired) Depth (cm):    Wound Description (Comments):    Removal Indications:    Wound WDL ex 08/03/22 0900   Dressing Appearance Intact;Moist drainage;Area marked 08/03/22 0900   Drainage Amount Large 08/03/22 0900   Drainage Characteristics/Odor Serosanguineous 08/03/22 0900   Appearance Red;Moist;Tan 08/03/22 0900   Tissue loss description Partial thickness 08/03/22 0900   Black (%), Wound Tissue Color 0 % 08/03/22 0900   Red (%), Wound Tissue Color 90 % 08/03/22 0900   Yellow (%), Wound Tissue Color 10 % 08/03/22 0900   Periwound Area Macerated;Pale white 08/03/22 0900   Wound Edges Callused 08/03/22 0900   Care Cleansed with:;Antimicrobial agent;Sterile normal saline 08/03/22 0900   Dressing Changed 08/03/22 0900   Periwound Care Moisture barrier applied;Skin barrier film applied 08/03/22 0900   Compression Tubular elasticized bandage 08/03/22 0900   Off Loading Football dressing;Off loading shoe 08/03/22 0900   Dressing Change Due 08/05/22 08/03/22 0900           Plan:     No orders of the defined types were placed in this encounter.          Follow up in about 2 days (around 8/5/2022) for wound care.

## 2022-08-05 ENCOUNTER — HOSPITAL ENCOUNTER (OUTPATIENT)
Dept: WOUND CARE | Facility: HOSPITAL | Age: 52
Discharge: HOME | End: 2022-08-05
Attending: PODIATRIST
Payer: MEDICAID

## 2022-08-05 VITALS — TEMPERATURE: 97 F | HEART RATE: 92 BPM | DIASTOLIC BLOOD PRESSURE: 72 MMHG | SYSTOLIC BLOOD PRESSURE: 149 MMHG

## 2022-08-05 DIAGNOSIS — M14.672 CHARCOT'S JOINT OF LEFT FOOT: ICD-10-CM

## 2022-08-05 DIAGNOSIS — E11.621 DIABETIC FOOT ULCER ASSOCIATED WITH TYPE 2 DIABETES MELLITUS: Primary | ICD-10-CM

## 2022-08-05 DIAGNOSIS — L97.509 DIABETIC FOOT ULCER ASSOCIATED WITH TYPE 2 DIABETES MELLITUS: Primary | ICD-10-CM

## 2022-08-05 DIAGNOSIS — L97.423 LEFT MIDFOOT ULCER, WITH NECROSIS OF MUSCLE: ICD-10-CM

## 2022-08-05 PROCEDURE — 11042 DBRDMT SUBQ TIS 1ST 20SQCM/<: CPT | Mod: ,,, | Performed by: FAMILY MEDICINE

## 2022-08-05 PROCEDURE — 99499 UNLISTED E&M SERVICE: CPT | Mod: ,,, | Performed by: FAMILY MEDICINE

## 2022-08-05 PROCEDURE — 99499 NO LOS: ICD-10-PCS | Mod: ,,, | Performed by: FAMILY MEDICINE

## 2022-08-05 PROCEDURE — 11042 DBRDMT SUBQ TIS 1ST 20SQCM/<: CPT | Performed by: FAMILY MEDICINE

## 2022-08-05 PROCEDURE — 11042 DEBRIDEMENT: ICD-10-PCS | Mod: ,,, | Performed by: FAMILY MEDICINE

## 2022-08-05 PROCEDURE — 11042 DBRDMT SUBQ TIS 1ST 20SQCM/<: CPT

## 2022-08-05 NOTE — PROGRESS NOTES
"[facility]                Progress Note    Subjective:       Patient ID: Audrey Natarajan is a 52 y.o. female.    Chief Complaint: Wound Check    Follow up wound care visit. Patient ambulated to exam room without assistance prescribed Darco shoe on Right Foot and Air Cast Boot on Left Foot. She c/o pain to Left Foot rating as 5/10 at present. Dressing to Left Foot intact, patient reports she "did not have to remove anything. The sock (Tubigrip) is working well". Drainage is a large amount with marked strike through noted to Tubigrip layer. Patient denies walking more than usual. Left Plantar Foot measuring smaller in (0.2 cm) length and (0.2 cm) width, hypergranulation noted in wound bed, with thick soft callus around perimeter - MD debrided.      Hydrofera Blue transfer added for hypergranulation and Drawtex/Mextra for drainage. Wound care done as per order. Patient to return to clinic for Nurse Visit on Wednesday 08/10 and MD in 1 week.    MD note:  Patient following up for DFU.  She states that she feels the foot may need to be xrayed soon as she is concerned her charcot foot is worsening due to swelling that occurs around the ankle.  There has been no trauma to the foot/ankle area.  No redness to the foot. She reports keeping her blood sugars well controlled.      Review of Systems   Constitutional: Negative.    HENT: Negative.    Eyes: Negative.    Respiratory: Negative.    Gastrointestinal: Negative.    Musculoskeletal: Positive for arthralgias.   Skin: Positive for wound.         Objective:        Physical Exam  Constitutional:       Appearance: Normal appearance.   HENT:      Head: Normocephalic and atraumatic.      Right Ear: External ear normal.      Left Ear: External ear normal.      Mouth/Throat:      Mouth: Mucous membranes are moist.      Pharynx: Oropharynx is clear.   Eyes:      Extraocular Movements: Extraocular movements intact.      " "Conjunctiva/sclera: Conjunctivae normal.      Pupils: Pupils are equal, round, and reactive to light.   Cardiovascular:      Rate and Rhythm: Normal rate and regular rhythm.   Pulmonary:      Effort: Pulmonary effort is normal. No respiratory distress.   Abdominal:      General: There is no distension.      Palpations: Abdomen is soft.   Skin:     General: Skin is warm and dry.      Comments: +left DFUwith mild maceration and callous formation   Neurological:      Mental Status: She is alert.           Vitals:    08/05/22 0911   BP: (!) 149/72   Pulse: 92   Temp: 97.3 °F (36.3 °C)      Debridement    Date/Time: 8/5/2022 8:58 AM  Performed by: Aiden Oleary MD  Authorized by: Aiden Oleary MD     Time out: Immediately prior to procedure a "time out" was called to verify the correct patient, procedure, equipment, support staff and site/side marked as required.    Consent Done?:  Yes (Written)  Local anesthesia used?: No      Wound Details:    Location:  Left foot    Location:  Left Plantar    Type of Debridement:  Excisional       Length (cm):  2       Area (sq cm):  6       Width (cm):  3       Percent Debrided (%):  100       Depth (cm):  0.1       Total Area Debrided (sq cm):  6    Depth of debridement:  Subcutaneous tissue    Tissue debrided:  Dermis, Epidermis and Subcutaneous    Devitalized tissue debrided:  Biofilm, Fibrin and Slough    Instruments:  Curette    Bleeding:  Minimal  Hemostasis Achieved: Yes    Method Used:  Pressure  Patient tolerance:  Patient tolerated the procedure well with no immediate complications      Assessment:           ICD-10-CM ICD-9-CM   1. Diabetic foot ulcer associated with type 2 diabetes mellitus  E11.621 250.80    L97.509 707.15   2. Charcot's joint of left foot  M14.672 094.0     713.5   3. Left midfoot ulcer, with necrosis of muscle  L97.423 707.14     728.89            Wound 04/15/22 2230 Diabetic Ulcer Left medial;plantar Foot (Active)   04/15/22 2230    Pre-existing: " Yes   Primary Wound Type: Diabetic ulc   Side: Left   Orientation: medial;plantar   Location: Foot   Wound Number:    Ankle-Brachial Index:    Pulses:    Removal Indication and Assessment:    Wound Outcome:    (Retired) Wound Type:    (Retired) Wound Length (cm):    (Retired) Wound Width (cm):    (Retired) Depth (cm):    Wound Description (Comments):    Removal Indications:    Wound Image    08/05/22 0900   Wound WDL ex 08/05/22 0900   Dressing Appearance Intact;Moist drainage;Area marked 08/05/22 0900   Drainage Amount Large 08/05/22 0900   Drainage Characteristics/Odor Serosanguineous;Malodorous 08/05/22 0900   Appearance Red;Tan;Moist;Hypergranulation 08/05/22 0900   Tissue loss description Partial thickness 08/05/22 0900   Black (%), Wound Tissue Color 0 % 08/05/22 0900   Red (%), Wound Tissue Color 80 % 08/05/22 0900   Yellow (%), Wound Tissue Color 20 % 08/05/22 0900   Periwound Area Macerated;Moist;Pale white 08/05/22 0900   Wound Edges Callused 08/05/22 0900   Wound Length (cm) 2 cm 08/05/22 0900   Wound Width (cm) 3 cm 08/05/22 0900   Wound Depth (cm) 0.1 cm 08/05/22 0900   Wound Volume (cm^3) 0.6 cm^3 08/05/22 0900   Wound Surface Area (cm^2) 6 cm^2 08/05/22 0900   Care Cleansed with:;Antimicrobial agent;Sterile normal saline 08/05/22 0900   Dressing Changed 08/05/22 0900   Periwound Care Moisture barrier applied 08/05/22 0900   Compression Tubular elasticized bandage 08/05/22 0900   Off Loading Football dressing;Off loading shoe 08/05/22 0900   Dressing Change Due 08/10/22 08/05/22 0900           Plan:                Orders Placed This Encounter   Procedures    Debridement     This order was created via procedure documentation     Standing Status:   Standing     Number of Occurrences:   1    Change dressing     Clean with NS.   Left Foot: Custom pad (with size of wound removed). Hydrofera Blue transfer (cut to fit) then Drawtex (cut to fit, with additional stacked layer) to wound bed. Mextra (reg x 1).  Alex (long x 2 laid perpendicular). Football (cast padding x 3), Tubigrip, single layer, size E (taped beneath foot with Medipore Tape). Air Cast Boot.   Right Foot: Darco.     Minimal swelling at the left ankle today.  No indication to xray now, but if swelling worsens or begins to effect the foot then will consider one   Follow up in about 5 days (around 8/10/2022) for wound care.     Aiden Oleary MD

## 2022-08-08 ENCOUNTER — TELEPHONE (OUTPATIENT)
Dept: FAMILY MEDICINE | Facility: CLINIC | Age: 52
End: 2022-08-08
Payer: MEDICAID

## 2022-08-08 DIAGNOSIS — I82.5Y9 CHRONIC DEEP VEIN THROMBOSIS (DVT) OF PROXIMAL VEIN OF LOWER EXTREMITY, UNSPECIFIED LATERALITY: ICD-10-CM

## 2022-08-08 NOTE — TELEPHONE ENCOUNTER
----- Message from Ysabel Mcclellan sent at 8/8/2022 10:19 AM CDT -----  Regarding: CAll BAck  Name of Who is Calling:SABIHA DUNNE [[id number]]             Patient requesting a call back about medications apixaban (ELIQUIS) 5 mg Tab states her medication is $ 939.00. Please assist              Can the clinic reply by [facility]: No              What Number to Call Back if not in [facility]:[phone]

## 2022-08-09 ENCOUNTER — TELEPHONE (OUTPATIENT)
Dept: PHARMACY | Facility: CLINIC | Age: 52
End: 2022-08-09
Payer: MEDICAID

## 2022-08-09 NOTE — TELEPHONE ENCOUNTER
Informed patient I her household proof of income, copy of current insurance card and pharmacy printout or EOB

## 2022-08-10 ENCOUNTER — HOSPITAL ENCOUNTER (OUTPATIENT)
Dept: WOUND CARE | Facility: HOSPITAL | Age: 52
Discharge: HOME | End: 2022-08-10
Payer: MEDICAID

## 2022-08-10 VITALS — HEART RATE: 76 BPM | TEMPERATURE: 98 F | SYSTOLIC BLOOD PRESSURE: 141 MMHG | DIASTOLIC BLOOD PRESSURE: 70 MMHG

## 2022-08-10 DIAGNOSIS — E11.621 DIABETIC FOOT ULCER ASSOCIATED WITH TYPE 2 DIABETES MELLITUS: Primary | ICD-10-CM

## 2022-08-10 DIAGNOSIS — L97.509 DIABETIC FOOT ULCER ASSOCIATED WITH TYPE 2 DIABETES MELLITUS: Primary | ICD-10-CM

## 2022-08-10 DIAGNOSIS — M14.672 CHARCOT'S JOINT OF LEFT FOOT: ICD-10-CM

## 2022-08-10 PROCEDURE — 99212 OFFICE O/P EST SF 10 MIN: CPT

## 2022-08-10 NOTE — PROGRESS NOTES
[facility]  [address]  [address]  Nurse Visit    Subjective:       Patient seen in clinic today.  Dressing changed as ordered. Dressing intact with a large amount of serous, brown tinged drainage.      Assessment:          Wound 04/15/22 2230 Diabetic Ulcer Left medial;plantar Foot (Active)   04/15/22 2230    Pre-existing: Yes   Primary Wound Type: Diabetic ulc   Side: Left   Orientation: medial;plantar   Location: Foot   Wound Number:    Ankle-Brachial Index:    Pulses:    Removal Indication and Assessment:    Wound Outcome:    (Retired) Wound Type:    (Retired) Wound Length (cm):    (Retired) Wound Width (cm):    (Retired) Depth (cm):    Wound Description (Comments):    Removal Indications:    Wound WDL ex 08/10/22 1000   Dressing Appearance Intact;Moist drainage 08/10/22 1000   Drainage Amount Large 08/10/22 1000   Drainage Characteristics/Odor Serous;Brown 08/10/22 1000   Appearance Red;Moist 08/10/22 1000   Tissue loss description Partial thickness 08/10/22 1000   Black (%), Wound Tissue Color 0 % 08/10/22 1000   Red (%), Wound Tissue Color 100 % 08/10/22 1000   Yellow (%), Wound Tissue Color 0 % 08/10/22 1000   Periwound Area Macerated;Pale white 08/10/22 1000   Wound Edges Defined 08/10/22 1000   Care Cleansed with:;Antimicrobial agent;Sterile normal saline;Applied:;Skin Barrier 08/10/22 1000   Dressing Changed 08/10/22 1000   Periwound Care Moisture barrier applied 08/10/22 1000   Compression Tubular elasticized bandage 08/10/22 1000   Off Loading Football dressing;Off loading shoe 08/10/22 1000   Dressing Change Due 08/12/22 08/10/22 1000           Plan:     No orders of the defined types were placed in this encounter.          Follow up in about 2 days (around 8/12/2022) for wound care.

## 2022-08-12 ENCOUNTER — HOSPITAL ENCOUNTER (OUTPATIENT)
Dept: WOUND CARE | Facility: HOSPITAL | Age: 52
Discharge: HOME | End: 2022-08-12
Attending: PODIATRIST
Payer: MEDICAID

## 2022-08-12 VITALS — SYSTOLIC BLOOD PRESSURE: 162 MMHG | HEART RATE: 108 BPM | TEMPERATURE: 98 F | DIASTOLIC BLOOD PRESSURE: 72 MMHG

## 2022-08-12 DIAGNOSIS — M14.672 CHARCOT'S JOINT OF LEFT FOOT: ICD-10-CM

## 2022-08-12 DIAGNOSIS — L97.423 LEFT MIDFOOT ULCER, WITH NECROSIS OF MUSCLE: ICD-10-CM

## 2022-08-12 DIAGNOSIS — L97.509 DIABETIC FOOT ULCER ASSOCIATED WITH TYPE 2 DIABETES MELLITUS: Primary | ICD-10-CM

## 2022-08-12 DIAGNOSIS — E11.621 DIABETIC FOOT ULCER ASSOCIATED WITH TYPE 2 DIABETES MELLITUS: Primary | ICD-10-CM

## 2022-08-12 PROCEDURE — 11042 DBRDMT SUBQ TIS 1ST 20SQCM/<: CPT | Performed by: PODIATRIST

## 2022-08-12 PROCEDURE — 11042 DEBRIDEMENT: ICD-10-PCS | Mod: ,,, | Performed by: PODIATRIST

## 2022-08-12 PROCEDURE — 99499 UNLISTED E&M SERVICE: CPT | Mod: ,,, | Performed by: PODIATRIST

## 2022-08-12 PROCEDURE — 99499 NO LOS: ICD-10-PCS | Mod: ,,, | Performed by: PODIATRIST

## 2022-08-12 PROCEDURE — 11042 DBRDMT SUBQ TIS 1ST 20SQCM/<: CPT | Mod: ,,, | Performed by: PODIATRIST

## 2022-08-12 NOTE — PROGRESS NOTES
"[facility]                Progress Note    Subjective:       Patient ID: Audrey Natarajan is a 52 y.o. female.    Chief Complaint: Wound Check    Follow up wound care visit. Patient ambulated to exam room without assistance prescribed Darco shoe on Right Foot and Air Cast Boot on Left Foot. She c/o pain to Left Ankle, but does not rate on pain scale at present. Dressing to Left Foot is cast padding and under layers previously placed, patient reports she "cut off Tubigrip yesterday" because she spilled coffee on her dressing. Drainage is a copious amount with marked strike through noted to outer layer of cast padding.       Review of Systems   Constitutional: Positive for activity change. Negative for appetite change, chills, fatigue and fever.   Respiratory: Negative for cough and shortness of breath.    Cardiovascular: Positive for leg swelling. Negative for chest pain.   Gastrointestinal: Negative for diarrhea, nausea and vomiting.   Musculoskeletal: Positive for arthralgias and myalgias.   Skin: Positive for wound.   Neurological: Positive for numbness. Negative for weakness.        + paresthesia    Psychiatric/Behavioral: The patient is nervous/anxious.          Objective:        Physical Exam  Nursing note reviewed.   Constitutional:       General: She is not in acute distress.     Appearance: She is not toxic-appearing or diaphoretic.   Cardiovascular:      Pulses:           Dorsalis pedis pulses are 1+ on the right side and 1+ on the left side.        Posterior tibial pulses are 2+ on the right side and 2+ on the left side.   Pulmonary:      Effort: No respiratory distress.   Musculoskeletal:      Right lower leg: Edema present.      Left lower leg: Edema present.      Right ankle: Swelling present. No lateral malleolus, medial malleolus, AITF ligament, CF ligament or posterior TF ligament tenderness. Decreased range of motion.      Right Achilles Tendon: No " defects. Paniagua's test negative.      Left ankle: Swelling present. No lateral malleolus, medial malleolus, AITF ligament, CF ligament, posterior TF ligament or proximal fibula tenderness. Decreased range of motion.      Left Achilles Tendon: No defects. Paniagua's test negative.      Right foot: Swelling and tenderness present.      Left foot: Swelling, Charcot foot and tenderness present.      Comments: There is equinus deformity bilateral with decreased dorsiflexion at the ankle joint bilateral    Decreased first MPJ range of motion both weightbearing and nonweightbearing, no crepitus observed the first MP joint, + dorsal flag sign. Mild  bunion deformity is observed .    Patient has hammertoes of digits 2-5 bilateral partially reducible without symptom today.     Skin:     General: Skin is warm and dry.      Coloration: Skin is not pale.      Findings: Erythema and wound (see below) present. No laceration.      Nails: There is no clubbing.   Neurological:      Sensory: No sensory deficit.      Motor: No tremor, atrophy or abnormal muscle tone.      Deep Tendon Reflexes: Reflexes are normal and symmetric.      Comments: Paresthesias, and hyperesthesia bilateral feet at toes with no clearly identified trigger or source.    [place]-Gil[place]to 5.07 monofilament is intact bilateral feet. Sharp/dull sensation is also intact Bilateral feet.   Psychiatric:         Attention and Perception: She is attentive.         Mood and Affect: Mood is not anxious. Affect is not inappropriate.         Speech: She is communicative. Speech is not slurred.         Behavior: Behavior is not combative.           Vitals:    08/12/22 0806   BP: (!) 162/72   Pulse: 108   Temp: 97.7 °F (36.5 °C)       Assessment:           ICD-10-CM ICD-9-CM   1. Diabetic foot ulcer associated with type 2 diabetes mellitus  E11.621 250.80    L97.509 707.15   2. Charcot's joint of left foot  M14.672 094.0     713.5   3. Left midfoot ulcer, with necrosis of  muscle  L97.423 707.14     728.89            Wound 04/15/22 2230 Diabetic Ulcer Left medial;plantar Foot (Active)   04/15/22 2230    Pre-existing: Yes   Primary Wound Type: Diabetic ulc   Side: Left   Orientation: medial;plantar   Location: Foot   Wound Number:    Ankle-Brachial Index:    Pulses:    Removal Indication and Assessment:    Wound Outcome:    (Retired) Wound Type:    (Retired) Wound Length (cm):    (Retired) Wound Width (cm):    (Retired) Depth (cm):    Wound Description (Comments):    Removal Indications:    Wound Image   08/12/22 0800   Wound WDL ex 08/12/22 0800   Dressing Appearance Intact;Moist drainage;Area marked 08/12/22 0800   Drainage Amount Copious 08/12/22 0800   Drainage Characteristics/Odor Serosanguineous;Malodorous 08/12/22 0800   Appearance Maroon;Pink;Moist 08/12/22 0800   Tissue loss description Partial thickness 08/12/22 0800   Black (%), Wound Tissue Color 0 % 08/12/22 0800   Red (%), Wound Tissue Color 100 % 08/12/22 0800   Yellow (%), Wound Tissue Color 0 % 08/12/22 0800   Periwound Area Macerated;Moist;Pale white 08/12/22 0800   Wound Edges Defined;Callused 08/12/22 0800   Wound Length (cm) 2 cm 08/12/22 0800   Wound Width (cm) 2.9 cm 08/12/22 0800   Wound Depth (cm) 0.2 cm 08/12/22 0800   Wound Volume (cm^3) 1.16 cm^3 08/12/22 0800   Wound Surface Area (cm^2) 5.8 cm^2 08/12/22 0800   Care Cleansed with:;Antimicrobial agent;Sterile normal saline 08/12/22 0800           Plan:            Left Plantar Foot measuring smaller in (0.1 cm) width     Debridement    Date/Time: 8/12/2022 8:00 AM  Performed by: Maira De Los Santos DPM  Authorized by: Maira De Los Santos DPM     Consent Done?:  Yes (Written)  Local anesthesia used?: No      Wound Details:    Location:  Left foot    Location:  Left Midfoot    Type of Debridement:  Excisional       Length (cm):  2       Area (sq cm):  5.8       Width (cm):  2.9       Percent Debrided (%):  100       Depth (cm):  2       Total Area Debrided (sq cm):   5.8    Depth of debridement:  Subcutaneous tissue    Tissue debrided:  Dermis, Epidermis and Subcutaneous    Devitalized tissue debrided:  Callus and Fibrin    Instruments:  Curette    Bleeding:  Minimal  Hemostasis Achieved: Yes    Method Used:  Pressure  Patient tolerance:  Patient tolerated the procedure well with no immediate complications         Patient to return to clinic for Nurse Visit on Wednesday 08/17 and MD in 1 week.        Orders Placed This Encounter   Procedures    Debridement     This order was created via procedure documentation     Standing Status:   Standing     Number of Occurrences:   1    Change dressing     Clean with NS.   Left Foot: Custom pad with size of wound removed. Jolynn to wound bed. Alex (long x 2 laid perpendicular). Football (cast padding x 3), Tubigrip (covering ankle to foot only), single layer, size E (taped beneath foot with Medipore Tape). Air Cast Boot.   Right Foot: Darco.    Change at Nurse Visit        Follow up in about 5 days (around 8/17/2022) for wound care.

## 2022-08-17 ENCOUNTER — HOSPITAL ENCOUNTER (OUTPATIENT)
Dept: WOUND CARE | Facility: HOSPITAL | Age: 52
Discharge: HOME | End: 2022-08-17
Payer: MEDICAID

## 2022-08-17 VITALS — DIASTOLIC BLOOD PRESSURE: 72 MMHG | TEMPERATURE: 98 F | HEART RATE: 109 BPM | SYSTOLIC BLOOD PRESSURE: 167 MMHG

## 2022-08-17 DIAGNOSIS — M14.672 CHARCOT'S JOINT OF LEFT FOOT: ICD-10-CM

## 2022-08-17 DIAGNOSIS — E11.621 DIABETIC FOOT ULCER ASSOCIATED WITH TYPE 2 DIABETES MELLITUS: Primary | ICD-10-CM

## 2022-08-17 DIAGNOSIS — L97.509 DIABETIC FOOT ULCER ASSOCIATED WITH TYPE 2 DIABETES MELLITUS: Primary | ICD-10-CM

## 2022-08-17 PROCEDURE — 99212 OFFICE O/P EST SF 10 MIN: CPT

## 2022-08-17 NOTE — PROGRESS NOTES
[facility]  [address] [place]  [id number]  Nurse Visit    Subjective:       Patient seen in clinic today.  Dressing changed as ordered. Patient asked about longer tubigrip, applied from below knee to over foot.    Assessment:          Wound 04/15/22 2230 Diabetic Ulcer Left medial;plantar Foot (Active)   04/15/22 2230    Pre-existing: Yes   Primary Wound Type: Diabetic ulc   Side: Left   Orientation: medial;plantar   Location: Foot   Wound Number:    Ankle-Brachial Index:    Pulses:    Removal Indication and Assessment:    Wound Outcome:    (Retired) Wound Type:    (Retired) Wound Length (cm):    (Retired) Wound Width (cm):    (Retired) Depth (cm):    Wound Description (Comments):    Removal Indications:    Wound WDL ex 08/17/22 1100   Dressing Appearance Intact;Moist drainage;Saturated;Area marked 08/17/22 1100   Drainage Amount Copious 08/17/22 1100   Appearance Pink;Moist 08/17/22 1100   Tissue loss description Partial thickness 08/17/22 1100   Black (%), Wound Tissue Color 0 % 08/17/22 1100   Red (%), Wound Tissue Color 100 % 08/17/22 1100   Yellow (%), Wound Tissue Color 0 % 08/17/22 1100   Periwound Area Dry 08/17/22 1100   Wound Edges Callused 08/17/22 1100   Care Cleansed with:;Antimicrobial agent;Sterile normal saline 08/17/22 1100   Dressing Changed 08/17/22 1100   Periwound Care Moisture barrier applied 08/17/22 1100   Compression Tubular elasticized bandage 08/17/22 1100   Off Loading Football dressing;Off loading shoe 08/17/22 1100   Dressing Change Due 08/19/22 08/17/22 1100           Plan:     No orders of the defined types were placed in this encounter.          Follow up in about 2 days (around 8/19/2022) for wound care.

## 2022-08-19 ENCOUNTER — HOSPITAL ENCOUNTER (OUTPATIENT)
Dept: WOUND CARE | Facility: HOSPITAL | Age: 52
Discharge: HOME | End: 2022-08-19
Attending: PODIATRIST
Payer: MEDICAID

## 2022-08-19 ENCOUNTER — HOSPITAL ENCOUNTER (EMERGENCY)
Facility: HOSPITAL | Age: 52
Discharge: HOME | End: 2022-08-19
Attending: EMERGENCY MEDICINE
Payer: MEDICAID

## 2022-08-19 VITALS
BODY MASS INDEX: 36.96 KG/M2 | WEIGHT: 230 LBS | RESPIRATION RATE: 18 BRPM | HEART RATE: 81 BPM | DIASTOLIC BLOOD PRESSURE: 75 MMHG | HEIGHT: 66 IN | TEMPERATURE: 98 F | OXYGEN SATURATION: 98 % | SYSTOLIC BLOOD PRESSURE: 175 MMHG

## 2022-08-19 VITALS — DIASTOLIC BLOOD PRESSURE: 64 MMHG | HEART RATE: 87 BPM | TEMPERATURE: 98 F | SYSTOLIC BLOOD PRESSURE: 157 MMHG

## 2022-08-19 DIAGNOSIS — U07.1 COVID-19: Primary | ICD-10-CM

## 2022-08-19 DIAGNOSIS — L97.423 LEFT MIDFOOT ULCER, WITH NECROSIS OF MUSCLE: Primary | ICD-10-CM

## 2022-08-19 DIAGNOSIS — E11.621 DIABETIC FOOT ULCER ASSOCIATED WITH TYPE 2 DIABETES MELLITUS: ICD-10-CM

## 2022-08-19 DIAGNOSIS — L97.509 DIABETIC FOOT ULCER ASSOCIATED WITH TYPE 2 DIABETES MELLITUS: ICD-10-CM

## 2022-08-19 DIAGNOSIS — M14.672 CHARCOT'S JOINT OF LEFT FOOT: ICD-10-CM

## 2022-08-19 LAB
CTP QC/QA: YES
CTP QC/QA: YES
POC MOLECULAR INFLUENZA A AGN: NEGATIVE
POC MOLECULAR INFLUENZA B AGN: NEGATIVE
SARS-COV-2 RDRP RESP QL NAA+PROBE: POSITIVE

## 2022-08-19 PROCEDURE — 11042 WOUND DEBRIDEMENT: ICD-10-PCS | Mod: ,,, | Performed by: PODIATRIST

## 2022-08-19 PROCEDURE — U0002 COVID-19 LAB TEST NON-CDC: HCPCS | Performed by: PHYSICIAN ASSISTANT

## 2022-08-19 PROCEDURE — 11042 DBRDMT SUBQ TIS 1ST 20SQCM/<: CPT | Performed by: PODIATRIST

## 2022-08-19 PROCEDURE — 99284 EMERGENCY DEPT VISIT MOD MDM: CPT | Mod: 25

## 2022-08-19 PROCEDURE — 11042 DBRDMT SUBQ TIS 1ST 20SQCM/<: CPT | Mod: ,,, | Performed by: PODIATRIST

## 2022-08-19 PROCEDURE — 99499 NO LOS: ICD-10-PCS | Mod: ,,, | Performed by: PODIATRIST

## 2022-08-19 PROCEDURE — 25000003 PHARM REV CODE 250: Performed by: PHYSICIAN ASSISTANT

## 2022-08-19 PROCEDURE — 87502 INFLUENZA DNA AMP PROBE: CPT

## 2022-08-19 PROCEDURE — 99499 UNLISTED E&M SERVICE: CPT | Mod: ,,, | Performed by: PODIATRIST

## 2022-08-19 RX ORDER — BENZONATATE 200 MG/1
200 CAPSULE ORAL 3 TIMES DAILY PRN
Qty: 30 CAPSULE | Refills: 0 | Status: SHIPPED | OUTPATIENT
Start: 2022-08-19 | End: 2022-08-29

## 2022-08-19 RX ORDER — FLUTICASONE PROPIONATE 50 MCG
2 SPRAY, SUSPENSION (ML) NASAL DAILY PRN
Qty: 9.9 ML | Refills: 0 | Status: SHIPPED | OUTPATIENT
Start: 2022-08-19 | End: 2023-02-06 | Stop reason: SDUPTHER

## 2022-08-19 RX ORDER — ACETAMINOPHEN 325 MG/1
650 TABLET ORAL
Status: COMPLETED | OUTPATIENT
Start: 2022-08-19 | End: 2022-08-19

## 2022-08-19 RX ORDER — BENZONATATE 100 MG/1
200 CAPSULE ORAL ONCE
Status: COMPLETED | OUTPATIENT
Start: 2022-08-19 | End: 2022-08-19

## 2022-08-19 RX ORDER — CETIRIZINE HYDROCHLORIDE 10 MG/1
10 TABLET ORAL DAILY
Qty: 10 TABLET | Refills: 0 | Status: ON HOLD | OUTPATIENT
Start: 2022-08-19 | End: 2022-12-27 | Stop reason: HOSPADM

## 2022-08-19 RX ADMIN — ACETAMINOPHEN 650 MG: 325 TABLET ORAL at 10:08

## 2022-08-19 RX ADMIN — BENZONATATE 200 MG: 100 CAPSULE ORAL at 10:08

## 2022-08-19 NOTE — Clinical Note
"Audrey"Corey Natarajan was seen and treated in our emergency department on 8/19/2022.     COVID-19 is present in our communities across the state. There is limited testing for COVID at this time, so not all patients can be tested. In this situation, your employee meets the following criteria:    Audrey Natarajan has met the criteria for COVID-19 testing and has a POSITIVE result. She can return to work once they are asymptomatic for 24 hours without the use of fever reducing medications AND at least five days from the first positive result. A mask is recommended for 5 days post quarantine.     If you have any questions or concerns, or if I can be of further assistance, please do not hesitate to contact me.    Sincerely,             Issac Rosas PA-C"

## 2022-08-19 NOTE — PROGRESS NOTES
"[facility]                Progress Note    Subjective:       Patient ID: Audrey Natarajan is a 52 y.o. female.    Chief Complaint: Wound Check    Follow up wound care visit. Patient ambulated to exam room without assistance prescribed Darco shoe on Right Foot and Air Cast Boot on Left Foot. She reports feeling "not well" since last Wednesday, runny nose and cough since then. Advised to contact PCP. Dressing to Left Foot intact but over toes and middle of boot fell.       Review of Systems   Constitutional:  Positive for activity change. Negative for appetite change, chills, fatigue and fever.   Respiratory:  Negative for cough and shortness of breath.    Cardiovascular:  Positive for leg swelling. Negative for chest pain.   Gastrointestinal:  Negative for diarrhea, nausea and vomiting.   Musculoskeletal:  Positive for arthralgias and myalgias.   Skin:  Positive for wound.   Neurological:  Positive for numbness. Negative for weakness.        + paresthesia    Psychiatric/Behavioral:  The patient is nervous/anxious.        Objective:        Physical Exam  Nursing note reviewed.   Constitutional:       General: She is not in acute distress.     Appearance: She is not toxic-appearing or diaphoretic.   Cardiovascular:      Pulses:           Dorsalis pedis pulses are 1+ on the right side and 1+ on the left side.        Posterior tibial pulses are 2+ on the right side and 2+ on the left side.   Pulmonary:      Effort: No respiratory distress.   Musculoskeletal:      Right lower leg: Edema present.      Left lower leg: Edema present.      Right ankle: Swelling present. No lateral malleolus, medial malleolus, AITF ligament, CF ligament or posterior TF ligament tenderness. Decreased range of motion.      Right Achilles Tendon: No defects. Paniagua's test negative.      Left ankle: Swelling present. No lateral malleolus, medial malleolus, AITF ligament, CF ligament, posterior " TF ligament or proximal fibula tenderness. Decreased range of motion.      Left Achilles Tendon: No defects. Paniagua's test negative.      Right foot: Swelling and tenderness present.      Left foot: Swelling, Charcot foot and tenderness present.      Comments: There is equinus deformity bilateral with decreased dorsiflexion at the ankle joint bilateral    Decreased first MPJ range of motion both weightbearing and nonweightbearing, no crepitus observed the first MP joint, + dorsal flag sign. Mild  bunion deformity is observed .    Patient has hammertoes of digits 2-5 bilateral partially reducible without symptom today.     Skin:     General: Skin is warm and dry.      Coloration: Skin is not pale.      Findings: Erythema and wound (see below) present. No laceration.      Nails: There is no clubbing.   Neurological:      Sensory: No sensory deficit.      Motor: No tremor, atrophy or abnormal muscle tone.      Deep Tendon Reflexes: Reflexes are normal and symmetric.      Comments: Paresthesias, and hyperesthesia bilateral feet at toes with no clearly identified trigger or source.    [place]-Gil[place]to 5.07 monofilament is intact bilateral feet. Sharp/dull sensation is also intact Bilateral feet.   Psychiatric:         Attention and Perception: She is attentive.         Mood and Affect: Mood is not anxious. Affect is not inappropriate.         Speech: She is communicative. Speech is not slurred.         Behavior: Behavior is not combative.       Vitals:    08/19/22 1057   BP: (!) 157/64   Pulse: 87   Temp: 97.5 °F (36.4 °C)       Assessment:           ICD-10-CM ICD-9-CM   1. Left midfoot ulcer, with necrosis of muscle  L97.423 707.14     728.89   2. Diabetic foot ulcer associated with type 2 diabetes mellitus  E11.621 250.80    L97.509 707.15   3. Charcot's joint of left foot  M14.672 094.0     713.5            Wound 04/15/22 2230 Diabetic Ulcer Left medial;plantar Foot (Active)   04/15/22 2230    Pre-existing: Yes    Primary Wound Type: Diabetic ulc   Side: Left   Orientation: medial;plantar   Location: Foot   Wound Number:    Ankle-Brachial Index:    Pulses:    Removal Indication and Assessment:    Wound Outcome:    (Retired) Wound Type:    (Retired) Wound Length (cm):    (Retired) Wound Width (cm):    (Retired) Depth (cm):    Wound Description (Comments):    Removal Indications:    Wound Image    08/19/22 1000   Wound WDL ex 08/19/22 1000   Dressing Appearance Intact;Moist drainage 08/19/22 1000   Drainage Amount Large 08/19/22 1000   Drainage Characteristics/Odor Serous 08/19/22 1000   Appearance Red;Moist 08/19/22 1000   Tissue loss description Partial thickness 08/19/22 1000   Black (%), Wound Tissue Color 0 % 08/19/22 1000   Red (%), Wound Tissue Color 100 % 08/19/22 1000   Yellow (%), Wound Tissue Color 0 % 08/19/22 1000   Periwound Area Macerated;Moist;Pale white 08/19/22 1000   Wound Edges Defined;Callused;Open 08/19/22 1000   Wound Length (cm) 1.9 cm 08/19/22 1000   Wound Width (cm) 2.8 cm 08/19/22 1000   Wound Depth (cm) 0.2 cm 08/19/22 1000   Wound Volume (cm^3) 1.064 cm^3 08/19/22 1000   Wound Surface Area (cm^2) 5.32 cm^2 08/19/22 1000   Care Cleansed with:;Antimicrobial agent;Sterile normal saline 08/19/22 1000   Dressing Changed 08/19/22 1000   Periwound Care Moisture barrier applied 08/19/22 1000   Compression Tubular elasticized bandage 08/19/22 1000   Off Loading Football dressing;Off loading shoe 08/19/22 1000   Dressing Change Due 08/24/22 08/19/22 1000           Plan:            Wound Debridement    Date/Time: 8/19/2022 10:37 AM  Performed by: Maira De Los Santos DPM  Authorized by: Maira De Lso Santos DPM       Wound Details:    Location:  Left foot    Location:  Left Midfoot    Type of Debridement:  Excisional       Length (cm):  1.9       Area (sq cm):  5.3       Width (cm):  2.8       Percent Debrided (%):  100       Depth (cm):  0.2       Total Area Debrided (sq cm):  5.3    Depth of debridement:  Subcutaneous  tissue    Tissue debrided:  Dermis, Epidermis and Muscle    Devitalized tissue debrided:  Fibrin and Callus    Instruments:  Curette    Bleeding:  Minimal  Hemostasis Achieved: Yes    Method Used:  Pressure  Patient tolerance:  Patient tolerated the procedure well with no immediate complications    Drainage is a large amount of serous drainage. Left Plantar Foot wound measuring smaller in (0.1 cm) length and (0.1 cm) width.    Wound care done per order. Nurse Visit on Wednesday and MD in 1 week.    Orders Placed This Encounter   Procedures    Debridement     This order was created via procedure documentation     Standing Status:   Standing     Number of Occurrences:   1    Change dressing     Clean with NS.   Left Foot: Custom pad with size of wound removed. Jolynn to wound bed. Alex (long x 2 laid perpendicular). Football (cast padding x 3). Tubigrip, single layer, size E (taped beneath foot with Medipore Tape). Air Cast Boot.   Right Foot: Darco.     Change at Nurse Visit        Follow up in about 5 days (around 8/24/2022) for wound care.

## 2022-08-20 NOTE — ED PROVIDER NOTES
Encounter Date: 8/19/2022       History     Chief Complaint   Patient presents with    COVID-19 Concerns     Pt presents to ED c/o chills, productive cough, cp pain with cough, sob, congestion and subjective fever.  Denies any other symptom.  Pt reports testing positive for COVID with home test today.  Hx of asthma and COPD.  Denies O2 usage at home. Pt reports taking OTC medication with mild relief. Pain 10/10.     53yo F smoker with chief complaint 3d hx productive cough, fatigue, chills, fever, myalgias, SOB, CP, back pain.    CP constant, worse with palpation, with deep inspiration.  Admits to thoracic back pain as well.  No ripping/tearing pain.  No associated diaphoresis, palpitations, n/v. No syncope or near syncope. Poor appetite and intake since onset of symptoms. No worsening of chronic LE swelling. States diabetic ulcers healing well--going to wound care.  Some relief of cough with over-the-counter medication.    PMH:  COPD  Obesity  HTN  HLD  NIDDM  Diabetic ulcer bilaterally  Charcot foot  Bipolar disorder  Chronic bilateral low back pain  CAD  Hx afib  Chronic DVT--nelly filter in place--on xarelto  Hx PE--on xarelto    TTE 5/6/22  Moderate left atrial enlargement.  The left ventricle is normal in size with normal systolic function.  The estimated ejection fraction is 65%.  Normal left ventricular diastolic function.  Normal right ventricular size with normal right ventricular systolic function.  Intermediate central venous pressure (8 mmHg).  The estimated PA systolic pressure is 26 mmHg.          Review of patient's allergies indicates:   Allergen Reactions    Morphine Other (See Comments)     Patient had a psychotic episode after taking Morphine  Agitation, hallucinations    Penicillins Anaphylaxis     itching    Januvia [sitagliptin] Hives    Carbamazepine Other (See Comments)     hyponatremia     Past Medical History:   Diagnosis Date    ADHD (attention deficit hyperactivity disorder)   "   Arthritis     Asthma     Bipolar 1 disorder     Cataract     Cigarette smoker     COPD (chronic obstructive pulmonary disease)     Coronary artery disease     A fib    Depression     bipolar manic depresson    Diabetes mellitus     Diabetic foot ulcers     Diabetic neuropathy     DVT of lower extremity, bilateral 07/2013    bilateral LE DVT. [place] filter placed.     Encounter for blood transfusion     History of blood clots 1. Left Leg=2003; 2.Bilateral Groin=Blood Clots= 5 or 6/ 2013 & 7/2013; 3. LLL of Lung=7/2013;  4. Lt. Lower Leg=7/2013.     Pt. had 1st Blood Clot after [id number], & Last=2013. Estelita Filter= Rt.Lateral Neck.    HTN (hypertension) 06/06/2013    Pt states that she does not have hypertension    Hypercholesteremia     Irregular heartbeat     Neuromuscular disorder     neuropathy feet    Obese     PE (pulmonary embolism) 07/2013    bilat LE DVT.     Restless leg syndrome      Past Surgical History:   Procedure Laterality Date    ABDOMINAL SURGERY  2010    gastric sleeve    BILATERAL OOPHORECTOMY Bilateral 1/12/2015    CHOLECYSTECTOMY      DEBRIDEMENT OF FOOT Bilateral 5/10/2022    Procedure: DEBRIDEMENT, FOOT;  Surgeon: Maira De Los Santos DPM;  Location: [facility] OR;  Service: Podiatry;  Laterality: Bilateral;    Green' s filter Right 7/4/2012    Right Neck & Tunneled Down.    HERNIA REPAIR      "[place] of Hernias Repaires around th Belly Button.", pt. states    LAPAROSCOPIC CHOLECYSTECTOMY N/A 9/10/2020    Procedure: CHOLECYSTECTOMY, LAPAROSCOPIC;  Surgeon: Montrell Gutierrez MD;  Location: [facility] OR;  Service: General;  Laterality: N/A;  RN PREOP 9/9----COVID Negative  9/9    OVARIAN CYST REMOVAL  3/13/2014    MA REMOVAL OF OVARY/TUBE(S)      SPLENECTOMY, TOTAL  July 2003    TONSILLECTOMY      as a child    TYMPANOSTOMY TUBE PLACEMENT  1976    VEIN SURGERY  2003    Lt leg     Family History   Problem Relation Age of Onset    Hypertension Father     " Diabetes Father     Heart disease Father     Cataracts Father     Diabetes Paternal Grandfather     Heart disease Paternal Grandfather     No Known Problems Mother     Ovarian cancer Maternal Grandmother          from this. ? age     No Known Problems Sister     No Known Problems Brother     No Known Problems Maternal Aunt     No Known Problems Maternal Uncle     No Known Problems Paternal Aunt     No Known Problems Paternal Uncle     No Known Problems Maternal Grandfather     Ovarian cancer Paternal Grandmother     Uterine cancer Neg Hx     Breast cancer Neg Hx     Colon cancer Neg Hx     Amblyopia Neg Hx     Blindness Neg Hx     Cancer Neg Hx     Glaucoma Neg Hx     Macular degeneration Neg Hx     Retinal detachment Neg Hx     Strabismus Neg Hx     Stroke Neg Hx     Thyroid disease Neg Hx      Social History     Tobacco Use    Smoking status: Current Every Day Smoker     Packs/day: 1.00     Years: 37.00     Pack years: 37.00     Types: Cigarettes     Last attempt to quit: 2020     Years since quittin.7    Smokeless tobacco: Never Used    Tobacco comment: Enrolled in the [employer] on 5/3/14 ([facility] Member ID # [id number]). Ambulatory referral to Smoking Cessation Program   Substance Use Topics    Alcohol use: No     Alcohol/week: 0.0 standard drinks    Drug use: No     Review of Systems   Constitutional: Positive for appetite change, chills, fatigue and fever.   HENT: Positive for congestion.    Respiratory: Positive for cough and shortness of breath.    Cardiovascular: Positive for chest pain. Negative for palpitations and leg swelling.   Musculoskeletal: Positive for back pain and myalgias. Negative for neck pain and neck stiffness.   Neurological: Negative for syncope and light-headedness.       Physical Exam     Initial Vitals [225]   BP Pulse Resp Temp SpO2   (!) 175/75 81 18 98.2 °F (36.8 °C) 98 %      MAP       --         Physical Exam    Nursing note  and vitals reviewed.  Constitutional: She appears well-developed and well-nourished. She is not diaphoretic. No distress.   Ill appearing nontoxic.  Sitting upright on exam table.  Speaking in full sentences without pause or difficulty.   HENT:   Head: Normocephalic and atraumatic.   Nasal congestion, boggy nasal mucosa, clear rhinorrhea   Neck: Neck supple.   Normal range of motion.  Cardiovascular:   1+ pitting pretibial edema bilaterally.  Normal sinus rhythm.  1+ radial bilaterally.   Pulmonary/Chest: Breath sounds normal. No respiratory distress. She has no wheezes.   Tenderness to entirety of infraclavicular chest and sternum.   Musculoskeletal:         General: Normal range of motion.      Cervical back: Normal range of motion and neck supple.      Comments: Bilateral boots in place    Tenderness to upper thoracic back.     Neurological: She is alert and oriented to person, place, and time. GCS score is 15. GCS eye subscore is 4. GCS verbal subscore is 5. GCS motor subscore is 6.   Skin: Skin is warm. Capillary refill takes less than 2 seconds.   Psychiatric: She has a normal mood and affect. Thought content normal.         ED Course   Procedures  Labs Reviewed   SARS-COV-2 RDRP GENE - Abnormal; Notable for the following components:       Result Value    POC Rapid COVID Positive (*)     All other components within normal limits   POCT INFLUENZA A/B MOLECULAR     EKG Readings: (Independently Interpreted)   Normal sinus rhythm, ventricular 85 beats per minute.  Normal ND, normal QT.  No right axis deviation.  No ST elevation.  Inverted T-wave V1.  No gross change from previous dated 05/04/2022.       Imaging Results          X-Ray Chest 1 View (Final result)  Result time 08/19/22 22:20:43    Final result by Juan Manuel Emmanuel MD (08/19/22 22:20:43)                 Impression:      No detrimental change when compared with 05/12/2022.      Electronically signed by: Juan Manuel Emmanuel  "MD  Date:    08/19/2022  Time:    22:20             Narrative:    EXAMINATION:  XR CHEST 1 VIEW    CLINICAL HISTORY:  Provided history is "  COVID-19".    TECHNIQUE:  One view of the chest.    COMPARISON:  05/12/2022.    FINDINGS:  Cardiomediastinal silhouette is magnified by portable technique but not felt to be significantly enlarged.  There are coarsened interstitial lung markings but no large focal area of consolidation.  No sizable pleural effusion.  No pneumothorax.                                 Medications   benzonatate capsule 200 mg (200 mg Oral Given 8/19/22 2245)   acetaminophen tablet 650 mg (650 mg Oral Given 8/19/22 2245)     Medical Decision Making:   ED Management:  Despite risk factors, chest pain is constant reproducible.  Her EKG is reassuring.  I do not think chest pain is related to ACS, rather suspect costochondritis.  No widened mediastinum on chest x-ray.  There is tenderness to the thoracic back as well.  No ripping/tearing pain.  Symmetric upper extremity pulses.  Low suspicion for dissection.    On Xarelto, [place] filter in place, despite shortness of breath complaint, there is no hypoxia, her lungs are clear, no worsening of her lower extremity swelling.  No new calf pain.  Think unlikely worsening of her chronic DVT or PE at this time.                      Clinical Impression:   Final diagnoses:  [U07.1] COVID-19 (Primary)          ED Disposition Condition    Discharge Stable        ED Prescriptions     Medication Sig Dispense Start Date End Date Auth. Provider    benzonatate (TESSALON) 200 MG capsule Take 1 capsule (200 mg total) by mouth 3 (three) times daily as needed for Cough. 30 capsule 8/19/2022 8/29/2022 Issac Rosas PA-C    cetirizine (ZYRTEC) 10 MG tablet Take 1 tablet (10 mg total) by mouth once daily. for 10 days 10 tablet 8/19/2022 8/29/2022 Issac Rosas PA-C    fluticasone propionate (FLONASE) 50 mcg/actuation nasal spray 2 sprays (100 mcg total) by Each " Nostril route daily as needed (Nasal congestion). 9.9 mL 8/19/2022  Issac Rosas PA-C        Follow-up Information     Follow up With Specialties Details Why Contact Info    Donaldo Pena MD Internal Medicine, Wound Care Schedule an appointment as soon as possible for a visit  For reevaluation, If symptoms persist [address] [facility] [id number]  [phone]             Issac Rosas PA-C  08/20/22 0434

## 2022-08-20 NOTE — ED TRIAGE NOTES
Pt presents to ED c/o chills, productive cough, cp pain with cough, sob, congestion and subjective fever. Denies any other symptom. Pt reports testing positive for COVID with home test today. Hx of asthma and COPD. Denies O2 usage at home. Pt reports taking OTC medication with mild relief. Pain 10/10

## 2022-08-20 NOTE — DISCHARGE INSTRUCTIONS
Drink lots of fluids, stay well hydrated. Tylenol/Ibuprofen as needed for discomfort; go back and forth between these two medications every 4 hrs as needed for temp greater than or equal to 100.4F, as needed for congestion/headache/body aches. Flonase for congestion. Tessalon for cough.  Continue to use your home nebulizer treatments every 4-6 hours as needed for shortness of breath, wheezing, chest discomfort.    Follow-up with primary care provider for reevaluation, further recommendations. Return to this ED if unable to treat fever, if symptoms persist or worsen despite treatment, if you begin with shortness of breath or difficulty breathing, if any other problems occur.

## 2022-08-24 ENCOUNTER — PATIENT MESSAGE (OUTPATIENT)
Dept: ADMINISTRATIVE | Facility: HOSPITAL | Age: 52
End: 2022-08-24
Payer: MEDICAID

## 2022-08-24 ENCOUNTER — TELEPHONE (OUTPATIENT)
Dept: WOUND CARE | Facility: HOSPITAL | Age: 52
End: 2022-08-24
Payer: MEDICAID

## 2022-08-26 ENCOUNTER — HOSPITAL ENCOUNTER (OUTPATIENT)
Dept: WOUND CARE | Facility: HOSPITAL | Age: 52
Discharge: HOME | End: 2022-08-26
Attending: PODIATRIST
Payer: MEDICAID

## 2022-08-26 VITALS
SYSTOLIC BLOOD PRESSURE: 138 MMHG | TEMPERATURE: 97 F | RESPIRATION RATE: 20 BRPM | HEART RATE: 70 BPM | DIASTOLIC BLOOD PRESSURE: 67 MMHG

## 2022-08-26 DIAGNOSIS — E11.621 DIABETIC FOOT ULCER ASSOCIATED WITH TYPE 2 DIABETES MELLITUS: Primary | ICD-10-CM

## 2022-08-26 DIAGNOSIS — L97.423 LEFT MIDFOOT ULCER, WITH NECROSIS OF MUSCLE: ICD-10-CM

## 2022-08-26 DIAGNOSIS — M14.672 CHARCOT'S JOINT OF LEFT FOOT: ICD-10-CM

## 2022-08-26 DIAGNOSIS — L97.509 DIABETIC FOOT ULCER ASSOCIATED WITH TYPE 2 DIABETES MELLITUS: Primary | ICD-10-CM

## 2022-08-26 PROCEDURE — 11042 WOUND DEBRIDEMENT: ICD-10-PCS | Mod: ,,, | Performed by: PODIATRIST

## 2022-08-26 PROCEDURE — 99499 UNLISTED E&M SERVICE: CPT | Mod: ,,, | Performed by: PODIATRIST

## 2022-08-26 PROCEDURE — 99499 NO LOS: ICD-10-PCS | Mod: ,,, | Performed by: PODIATRIST

## 2022-08-26 PROCEDURE — 11042 DBRDMT SUBQ TIS 1ST 20SQCM/<: CPT | Mod: ,,, | Performed by: PODIATRIST

## 2022-08-26 PROCEDURE — 11042 DBRDMT SUBQ TIS 1ST 20SQCM/<: CPT | Performed by: PODIATRIST

## 2022-08-26 NOTE — PROGRESS NOTES
[facility]                Progress Note    Subjective:       Patient ID: Audrey Natarajan is a 52 y.o. female.    Chief Complaint: Wound Check    F/u wound care visit. Patient ambulatory to exam room with no distress noted. No c/o pain or discomfort voiced. Wound dressing to left foot intact with dirt and hole at plantar area of dressing with large amount of drainage and moisture noted. Patient denies increased ambulation but was unable to come to scheduled nurse visit for dressing changed this week.       Review of Systems   Constitutional:  Positive for activity change. Negative for appetite change, chills, fatigue and fever.   Respiratory:  Negative for cough and shortness of breath.    Cardiovascular:  Positive for leg swelling. Negative for chest pain.   Gastrointestinal:  Negative for diarrhea, nausea and vomiting.   Musculoskeletal:  Positive for arthralgias and myalgias.   Skin:  Positive for wound.   Neurological:  Positive for numbness. Negative for weakness.        + paresthesia    Psychiatric/Behavioral:  The patient is nervous/anxious.        Objective:        Physical Exam  Nursing note reviewed.   Constitutional:       General: She is not in acute distress.     Appearance: She is not toxic-appearing or diaphoretic.   Cardiovascular:      Pulses:           Dorsalis pedis pulses are 1+ on the right side and 1+ on the left side.        Posterior tibial pulses are 2+ on the right side and 2+ on the left side.   Pulmonary:      Effort: No respiratory distress.   Musculoskeletal:      Right lower leg: Edema present.      Left lower leg: Edema present.      Right ankle: Swelling present. No lateral malleolus, medial malleolus, AITF ligament, CF ligament or posterior TF ligament tenderness. Decreased range of motion.      Right Achilles Tendon: No defects. Paniagua's test negative.      Left ankle: Swelling present. No lateral malleolus, medial malleolus,  AITF ligament, CF ligament, posterior TF ligament or proximal fibula tenderness. Decreased range of motion.      Left Achilles Tendon: No defects. Paniagua's test negative.      Right foot: Swelling and tenderness present.      Left foot: Swelling, Charcot foot and tenderness present.      Comments: There is equinus deformity bilateral with decreased dorsiflexion at the ankle joint bilateral    Decreased first MPJ range of motion both weightbearing and nonweightbearing, no crepitus observed the first MP joint, + dorsal flag sign. Mild  bunion deformity is observed .    Patient has hammertoes of digits 2-5 bilateral partially reducible without symptom today.     Skin:     General: Skin is warm and dry.      Coloration: Skin is not pale.      Findings: Erythema and wound (see below) present. No laceration.      Nails: There is no clubbing.   Neurological:      Sensory: No sensory deficit.      Motor: No tremor, atrophy or abnormal muscle tone.      Deep Tendon Reflexes: Reflexes are normal and symmetric.      Comments: Paresthesias, and hyperesthesia bilateral feet at toes with no clearly identified trigger or source.    Mckenna-Gilberto 5.07 monofilament is intact bilateral feet. Sharp/dull sensation is also intact Bilateral feet.   Psychiatric:         Attention and Perception: She is attentive.         Mood and Affect: Mood is not anxious. Affect is not inappropriate.         Speech: She is communicative. Speech is not slurred.         Behavior: Behavior is not combative.       Vitals:    08/26/22 1128   BP: 138/67   Pulse: 70   Resp: 20   Temp: 97.3 °F (36.3 °C)       Assessment:           ICD-10-CM ICD-9-CM   1. Diabetic foot ulcer associated with type 2 diabetes mellitus  E11.621 250.80    L97.509 707.15   2. Charcot's joint of left foot  M14.672 094.0     713.5   3. Left midfoot ulcer, with necrosis of muscle  L97.423 707.14     728.89            Wound 04/15/22 2230 Diabetic Ulcer Left medial;plantar Foot  (Active)   04/15/22 2230    Pre-existing: Yes   Primary Wound Type: Diabetic ulc   Side: Left   Orientation: medial;plantar   Location: Foot   Wound Number:    Ankle-Brachial Index:    Pulses:    Removal Indication and Assessment:    Wound Outcome:    (Retired) Wound Type:    (Retired) Wound Length (cm):    (Retired) Wound Width (cm):    (Retired) Depth (cm):    Wound Description (Comments):    Removal Indications:    Wound Image   08/26/22 1100   Wound WDL ex 08/26/22 1100   Dressing Appearance Intact;Moist drainage 08/26/22 1100   Drainage Amount Large 08/26/22 1100   Drainage Characteristics/Odor Serosanguineous;Malodorous 08/26/22 1100   Appearance Pink 08/26/22 1100   Tissue loss description Partial thickness 08/26/22 1100   Black (%), Wound Tissue Color 0 % 08/26/22 1100   Red (%), Wound Tissue Color 100 % 08/26/22 1100   Yellow (%), Wound Tissue Color 0 % 08/26/22 1100   Periwound Area Macerated;Moist 08/26/22 1100   Wound Edges Callused 08/26/22 1100   Wound Length (cm) 2.4 cm 08/26/22 1100   Wound Width (cm) 2.7 cm 08/26/22 1100   Wound Depth (cm) 0.2 cm 08/26/22 1100   Wound Volume (cm^3) 1.296 cm^3 08/26/22 1100   Wound Surface Area (cm^2) 6.48 cm^2 08/26/22 1100   Care Cleansed with:;Soap and water;Sterile normal saline 08/26/22 1100   Dressing Changed 08/26/22 1100   Periwound Care Moisture barrier applied 08/26/22 1100   Compression Tubular elasticized bandage 08/26/22 1100   Off Loading Football dressing;Off loading shoe 08/26/22 1100   Dressing Change Due 08/31/22 08/26/22 1100           Plan:              Wound Debridement    Date/Time: 8/26/2022 11:00 AM  Performed by: Maira De Los Santos DPM  Authorized by: Maira De Los Santos DPM       Wound Details:    Location:  Left foot    Location:  Left Plantar    Type of Debridement:  Excisional       Length (cm):  2.4       Area (sq cm):  6.5       Width (cm):  2.7       Percent Debrided (%):  100       Depth (cm):  0.2       Total Area Debrided (sq cm):  6.5     Depth of debridement:  Subcutaneous tissue    Tissue debrided:  Dermis, Epidermis and Subcutaneous    Instruments:  Curette    Bleeding:  Minimal  Hemostasis Achieved: Yes    Method Used:  Pressure  Patient tolerance:  Patient tolerated the procedure well with no immediate complications    Wound to left plantar foot measuring 0.5cm larger length and 0.1cm smaller width. Wound care done per order. RTC on Wednesday 8/31/22 for nurse visit and 9/2/22 for MD visit.    Orders Placed This Encounter   Procedures    Debridement     This order was created via procedure documentation     Standing Status:   Standing     Number of Occurrences:   1    Change dressing     Clean with NS. Gentian violet to periwound.  Left Foot: Urgotul AG to wound bed. Drawtex, Mextra short x1. Alex foam (long x 2 laid perpendicular). Football (cast padding x 3). Tubigrip, single layer, size D (E n/a) (taped beneath foot with Medipore Tape). Air Cast Boot.   Right Foot: Darco.     Change at Nurse Visit        Follow up in about 5 days (around 8/31/2022) for nurse visit.

## 2022-08-31 ENCOUNTER — TELEPHONE (OUTPATIENT)
Dept: WOUND CARE | Facility: HOSPITAL | Age: 52
End: 2022-08-31
Payer: MEDICAID

## 2022-08-31 ENCOUNTER — HOSPITAL ENCOUNTER (OUTPATIENT)
Dept: WOUND CARE | Facility: HOSPITAL | Age: 52
Discharge: HOME | End: 2022-08-31
Attending: FAMILY MEDICINE
Payer: MEDICAID

## 2022-08-31 VITALS — TEMPERATURE: 97 F | SYSTOLIC BLOOD PRESSURE: 162 MMHG | DIASTOLIC BLOOD PRESSURE: 87 MMHG | HEART RATE: 107 BPM

## 2022-08-31 DIAGNOSIS — E11.621 DIABETIC FOOT ULCER ASSOCIATED WITH TYPE 2 DIABETES MELLITUS: Primary | ICD-10-CM

## 2022-08-31 DIAGNOSIS — L97.509 DIABETIC FOOT ULCER ASSOCIATED WITH TYPE 2 DIABETES MELLITUS: Primary | ICD-10-CM

## 2022-08-31 DIAGNOSIS — M14.672 CHARCOT'S JOINT OF LEFT FOOT: ICD-10-CM

## 2022-08-31 PROCEDURE — 99212 OFFICE O/P EST SF 10 MIN: CPT

## 2022-08-31 NOTE — PROGRESS NOTES
[facility]  [address] [place]  [address]  Nurse Visit    Subjective:       Patient seen in clinic today.  Dressing changed as ordered.      Assessment:          Wound 04/15/22 2230 Diabetic Ulcer Left medial;plantar Foot (Active)   04/15/22 2230    Pre-existing: Yes   Primary Wound Type: Diabetic ulc   Side: Left   Orientation: medial;plantar   Location: Foot   Wound Number:    Ankle-Brachial Index:    Pulses:    Removal Indication and Assessment:    Wound Outcome:    (Retired) Wound Type:    (Retired) Wound Length (cm):    (Retired) Wound Width (cm):    (Retired) Depth (cm):    Wound Description (Comments):    Removal Indications:    Wound WDL ex 08/31/22 1600   Dressing Appearance Intact;Moist drainage;Area marked 08/31/22 1600   Drainage Amount Large 08/31/22 1600   Drainage Characteristics/Odor Serous;Malodorous 08/31/22 1600   Appearance Pink;Moist 08/31/22 1600   Tissue loss description Partial thickness 08/31/22 1600   Black (%), Wound Tissue Color 0 % 08/31/22 1600   Red (%), Wound Tissue Color 100 % 08/31/22 1600   Yellow (%), Wound Tissue Color 0 % 08/31/22 1600   Periwound Area Macerated;Moist;Pale white 08/31/22 1600   Wound Edges Defined;Open 08/31/22 1600   Care Cleansed with:;Antimicrobial agent;Sterile normal saline 08/31/22 1600   Dressing Changed 08/31/22 1600   Periwound Care Moisture barrier applied 08/31/22 1600   Compression Tubular elasticized bandage 08/31/22 1600   Off Loading Football dressing;Off loading shoe 08/31/22 1600   Dressing Change Due 09/02/22 08/31/22 1600           Plan:     No orders of the defined types were placed in this encounter.          Follow up in about 2 days (around 9/2/2022).

## 2022-09-02 ENCOUNTER — HOSPITAL ENCOUNTER (OUTPATIENT)
Dept: WOUND CARE | Facility: HOSPITAL | Age: 52
Discharge: HOME | End: 2022-09-02
Attending: PODIATRIST
Payer: MEDICAID

## 2022-09-02 VITALS — HEART RATE: 70 BPM

## 2022-09-02 DIAGNOSIS — L97.423 LEFT MIDFOOT ULCER, WITH NECROSIS OF MUSCLE: ICD-10-CM

## 2022-09-02 DIAGNOSIS — M14.672 CHARCOT'S JOINT OF LEFT FOOT: Primary | ICD-10-CM

## 2022-09-02 DIAGNOSIS — E11.49 TYPE II DIABETES MELLITUS WITH NEUROLOGICAL MANIFESTATIONS: ICD-10-CM

## 2022-09-02 PROCEDURE — 99213 OFFICE O/P EST LOW 20 MIN: CPT | Mod: ,,, | Performed by: PODIATRIST

## 2022-09-02 PROCEDURE — 99213 PR OFFICE/OUTPT VISIT, EST, LEVL III, 20-29 MIN: ICD-10-PCS | Mod: ,,, | Performed by: PODIATRIST

## 2022-09-02 RX ORDER — DOXYCYCLINE HYCLATE 100 MG
100 TABLET ORAL 2 TIMES DAILY
Qty: 40 TABLET | Refills: 0 | Status: ON HOLD | OUTPATIENT
Start: 2022-09-02 | End: 2022-09-09 | Stop reason: HOSPADM

## 2022-09-02 RX ORDER — FLUCONAZOLE 150 MG/1
150 TABLET ORAL DAILY
Qty: 1 TABLET | Refills: 0 | Status: SHIPPED | OUTPATIENT
Start: 2022-09-02 | End: 2022-09-03

## 2022-09-02 NOTE — PROGRESS NOTES
[facility]                Progress Note    Subjective:       Patient ID: Audrey Natarajan is a 52 y.o. female.    Chief Complaint: Wound Check    Walked to clinic  unaided. Made aware must come on time to appointments as was 20 min late today. Troubleshot with pt in ways may be on time to appointment.     Review of Systems   Constitutional:  Positive for activity change. Negative for appetite change, chills, fatigue and fever.   Respiratory:  Negative for cough and shortness of breath.    Cardiovascular:  Positive for leg swelling. Negative for chest pain.   Gastrointestinal:  Negative for diarrhea, nausea and vomiting.   Musculoskeletal:  Positive for arthralgias and myalgias.   Skin:  Positive for wound.   Neurological:  Positive for numbness. Negative for weakness.        + paresthesia    Psychiatric/Behavioral:  The patient is nervous/anxious.        Objective:        Physical Exam  Nursing note reviewed.   Constitutional:       General: She is not in acute distress.     Appearance: She is not toxic-appearing or diaphoretic.   Cardiovascular:      Pulses:           Dorsalis pedis pulses are 1+ on the right side and 1+ on the left side.        Posterior tibial pulses are 2+ on the right side and 2+ on the left side.   Pulmonary:      Effort: No respiratory distress.   Musculoskeletal:      Right lower leg: Edema present.      Left lower leg: Edema present.      Right ankle: Swelling present. No lateral malleolus, medial malleolus, AITF ligament, CF ligament or posterior TF ligament tenderness. Decreased range of motion.      Right Achilles Tendon: No defects. Paniagua's test negative.      Left ankle: Swelling present. No lateral malleolus, medial malleolus, AITF ligament, CF ligament, posterior TF ligament or proximal fibula tenderness. Decreased range of motion.      Left Achilles Tendon: No defects. Paniagua's test negative.      Right foot: Swelling and  tenderness present.      Left foot: Swelling, Charcot foot and tenderness present.      Comments: There is equinus deformity bilateral with decreased dorsiflexion at the ankle joint bilateral    Decreased first MPJ range of motion both weightbearing and nonweightbearing, no crepitus observed the first MP joint, + dorsal flag sign. Mild  bunion deformity is observed .    Patient has hammertoes of digits 2-5 bilateral partially reducible without symptom today.     Skin:     General: Skin is warm and dry.      Coloration: Skin is not pale.      Findings: Erythema and wound (see below) present. No laceration.      Nails: There is no clubbing.   Neurological:      Sensory: No sensory deficit.      Motor: No tremor, atrophy or abnormal muscle tone.      Deep Tendon Reflexes: Reflexes are normal and symmetric.      Comments: Paresthesias, and hyperesthesia bilateral feet at toes with no clearly identified trigger or source.    Bern-Gilberto 5.07 monofilament is intact bilateral feet. Sharp/dull sensation is also intact Bilateral feet.   Psychiatric:         Attention and Perception: She is attentive.         Mood and Affect: Mood is not anxious. Affect is not inappropriate.         Speech: She is communicative. Speech is not slurred.         Behavior: Behavior is not combative.       Vitals:    09/02/22 1137   BP: (P) 130/70   Pulse: 70   Temp: (P) 97.2 °F (36.2 °C)       Assessment:           ICD-10-CM ICD-9-CM   1. Charcot's joint of left foot  M14.672 094.0     713.5   2. Left midfoot ulcer, with necrosis of muscle  L97.423 707.14     728.89   3. Type II diabetes mellitus with neurological manifestations  E11.49 250.60            Wound 04/15/22 2230 Diabetic Ulcer Left medial;plantar Foot (Active)   04/15/22 2230    Pre-existing: Yes   Primary Wound Type: Diabetic ulc   Side: Left   Orientation: medial;plantar   Location: Foot   Wound Number:    Ankle-Brachial Index:    Pulses:    Removal Indication and Assessment:     Wound Outcome:    (Retired) Wound Type:    (Retired) Wound Length (cm):    (Retired) Wound Width (cm):    (Retired) Depth (cm):    Wound Description (Comments):    Removal Indications:    Wound Image   09/02/22 1100   Wound WDL ex 09/02/22 1100   Dressing Appearance Dry;Intact 09/02/22 1100   Drainage Amount Moderate 09/02/22 1100   Appearance Red 09/02/22 1100   Tissue loss description Partial thickness 09/02/22 1100   Red (%), Wound Tissue Color 100 % 09/02/22 1100   Periwound Area Intact;Dry 09/02/22 1100   Wound Edges Defined 09/02/22 1100   Wound Length (cm) 2.5 cm 09/02/22 1100   Wound Width (cm) 3.2 cm 09/02/22 1100   Wound Depth (cm) 0.2 cm 09/02/22 1100   Wound Volume (cm^3) 1.6 cm^3 09/02/22 1100   Wound Surface Area (cm^2) 8 cm^2 09/02/22 1100   Care Cleansed with:;Antimicrobial agent;Sterile normal saline 09/02/22 1100   Dressing Changed 09/02/22 1100   Off Loading Football dressing;Off loading shoe 09/02/22 1100   Dressing Change Due 09/09/22 09/02/22 1100           Plan:            Wound about the same size with large amt drainage.  Patient requires more frequent dressing changes.  No HH is able to go to her home due to location    Orders Placed This Encounter   Procedures    Change dressing     Clean with NS. Gentian violet to periwound.   Left Foot: Urgotul AG to wound bed. Drawtex, Mextra short x1. Alex foam (long x 2 laid perpendicular). Football (cast padding x 3). Tubigrip, single layer, size D (E n/a) (taped beneath foot with Medipore Tape). Air Cast Boot.   Right Foot: Darco.     Change at Nurse Visit        Follow up in about 1 week (around 9/9/2022) for nurse visit on Wed.

## 2022-09-07 ENCOUNTER — HOSPITAL ENCOUNTER (INPATIENT)
Facility: HOSPITAL | Age: 52
LOS: 2 days | Discharge: HOME | DRG: 603 | End: 2022-09-09
Attending: EMERGENCY MEDICINE | Admitting: EMERGENCY MEDICINE
Payer: MEDICAID

## 2022-09-07 ENCOUNTER — HOSPITAL ENCOUNTER (OUTPATIENT)
Dept: WOUND CARE | Facility: HOSPITAL | Age: 52
Discharge: HOME | DRG: 603 | End: 2022-09-07
Attending: FAMILY MEDICINE
Payer: MEDICAID

## 2022-09-07 VITALS
SYSTOLIC BLOOD PRESSURE: 161 MMHG | TEMPERATURE: 97 F | RESPIRATION RATE: 20 BRPM | DIASTOLIC BLOOD PRESSURE: 70 MMHG | HEART RATE: 88 BPM

## 2022-09-07 DIAGNOSIS — L97.423 LEFT MIDFOOT ULCER, WITH NECROSIS OF MUSCLE: ICD-10-CM

## 2022-09-07 DIAGNOSIS — E11.621 DIABETIC FOOT ULCER ASSOCIATED WITH TYPE 2 DIABETES MELLITUS: ICD-10-CM

## 2022-09-07 DIAGNOSIS — L03.115 CELLULITIS OF BOTH FEET: ICD-10-CM

## 2022-09-07 DIAGNOSIS — M14.672 CHARCOT'S JOINT OF LEFT FOOT: Primary | ICD-10-CM

## 2022-09-07 DIAGNOSIS — Z88.0 HISTORY OF PENICILLIN ALLERGY: ICD-10-CM

## 2022-09-07 DIAGNOSIS — L03.116 CELLULITIS OF BOTH FEET: ICD-10-CM

## 2022-09-07 DIAGNOSIS — M14.672 CHARCOT'S JOINT OF LEFT FOOT: ICD-10-CM

## 2022-09-07 DIAGNOSIS — L03.116 CELLULITIS OF LEFT LOWER EXTREMITY: Primary | ICD-10-CM

## 2022-09-07 DIAGNOSIS — L97.509 DIABETIC FOOT ULCER ASSOCIATED WITH TYPE 2 DIABETES MELLITUS: ICD-10-CM

## 2022-09-07 DIAGNOSIS — L97.509 DIABETIC FOOT ULCER: ICD-10-CM

## 2022-09-07 DIAGNOSIS — E11.621 DIABETIC FOOT ULCER: ICD-10-CM

## 2022-09-07 DIAGNOSIS — E87.5 HYPERKALEMIA: ICD-10-CM

## 2022-09-07 DIAGNOSIS — S91.302A OPEN WOUND OF LEFT FOOT, INITIAL ENCOUNTER: ICD-10-CM

## 2022-09-07 LAB
ALBUMIN SERPL BCP-MCNC: 3.1 G/DL (ref 3.5–5.2)
ALP SERPL-CCNC: 112 U/L (ref 55–135)
ALT SERPL W/O P-5'-P-CCNC: 8 U/L (ref 10–44)
ANION GAP SERPL CALC-SCNC: 9 MMOL/L (ref 8–16)
AST SERPL-CCNC: 9 U/L (ref 10–40)
BASOPHILS # BLD AUTO: 0.11 K/UL (ref 0–0.2)
BASOPHILS NFR BLD: 0.7 % (ref 0–1.9)
BILIRUB SERPL-MCNC: 0.1 MG/DL (ref 0.1–1)
BUN SERPL-MCNC: 13 MG/DL (ref 6–20)
CALCIUM SERPL-MCNC: 9.3 MG/DL (ref 8.7–10.5)
CHLORIDE SERPL-SCNC: 92 MMOL/L (ref 95–110)
CO2 SERPL-SCNC: 28 MMOL/L (ref 23–29)
CREAT SERPL-MCNC: 0.7 MG/DL (ref 0.5–1.4)
CRP SERPL-MCNC: 33.3 MG/L (ref 0–8.2)
DIFFERENTIAL METHOD: ABNORMAL
EOSINOPHIL # BLD AUTO: 0.3 K/UL (ref 0–0.5)
EOSINOPHIL NFR BLD: 2 % (ref 0–8)
ERYTHROCYTE [DISTWIDTH] IN BLOOD BY AUTOMATED COUNT: 18.5 % (ref 11.5–14.5)
ERYTHROCYTE [SEDIMENTATION RATE] IN BLOOD BY WESTERGREN METHOD: 37 MM/HR (ref 0–20)
EST. GFR  (NO RACE VARIABLE): >60 ML/MIN/1.73 M^2
GLUCOSE SERPL-MCNC: 102 MG/DL (ref 70–110)
HCT VFR BLD AUTO: 30.2 % (ref 37–48.5)
HGB BLD-MCNC: 10 G/DL (ref 12–16)
IMM GRANULOCYTES # BLD AUTO: 0.1 K/UL (ref 0–0.04)
IMM GRANULOCYTES NFR BLD AUTO: 0.6 % (ref 0–0.5)
LYMPHOCYTES # BLD AUTO: 6.9 K/UL (ref 1–4.8)
LYMPHOCYTES NFR BLD: 43.7 % (ref 18–48)
MCH RBC QN AUTO: 24.3 PG (ref 27–31)
MCHC RBC AUTO-ENTMCNC: 33.1 G/DL (ref 32–36)
MCV RBC AUTO: 73 FL (ref 82–98)
MONOCYTES # BLD AUTO: 1.9 K/UL (ref 0.3–1)
MONOCYTES NFR BLD: 11.7 % (ref 4–15)
NEUTROPHILS # BLD AUTO: 6.5 K/UL (ref 1.8–7.7)
NEUTROPHILS NFR BLD: 41.3 % (ref 38–73)
NRBC BLD-RTO: 0 /100 WBC
PLATELET # BLD AUTO: 684 K/UL (ref 150–450)
PMV BLD AUTO: 8.8 FL (ref 9.2–12.9)
POCT GLUCOSE: 117 MG/DL (ref 70–110)
POTASSIUM SERPL-SCNC: 5.2 MMOL/L (ref 3.5–5.1)
PROT SERPL-MCNC: 7.1 G/DL (ref 6–8.4)
RBC # BLD AUTO: 4.12 M/UL (ref 4–5.4)
SODIUM SERPL-SCNC: 129 MMOL/L (ref 136–145)
WBC # BLD AUTO: 15.83 K/UL (ref 3.9–12.7)

## 2022-09-07 PROCEDURE — 85652 RBC SED RATE AUTOMATED: CPT | Performed by: EMERGENCY MEDICINE

## 2022-09-07 PROCEDURE — 87077 CULTURE AEROBIC IDENTIFY: CPT | Performed by: PHYSICIAN ASSISTANT

## 2022-09-07 PROCEDURE — 99291 CRITICAL CARE FIRST HOUR: CPT | Mod: 25

## 2022-09-07 PROCEDURE — 99212 OFFICE O/P EST SF 10 MIN: CPT | Mod: 25

## 2022-09-07 PROCEDURE — 80053 COMPREHEN METABOLIC PANEL: CPT | Performed by: EMERGENCY MEDICINE

## 2022-09-07 PROCEDURE — 25000003 PHARM REV CODE 250: Performed by: PHYSICIAN ASSISTANT

## 2022-09-07 PROCEDURE — 93010 EKG 12-LEAD: ICD-10-PCS | Mod: ,,, | Performed by: INTERNAL MEDICINE

## 2022-09-07 PROCEDURE — 87070 CULTURE OTHR SPECIMN AEROBIC: CPT | Performed by: PHYSICIAN ASSISTANT

## 2022-09-07 PROCEDURE — 86140 C-REACTIVE PROTEIN: CPT | Performed by: EMERGENCY MEDICINE

## 2022-09-07 PROCEDURE — 87147 CULTURE TYPE IMMUNOLOGIC: CPT | Performed by: PHYSICIAN ASSISTANT

## 2022-09-07 PROCEDURE — 87186 SC STD MICRODIL/AGAR DIL: CPT | Performed by: PHYSICIAN ASSISTANT

## 2022-09-07 PROCEDURE — 63600175 PHARM REV CODE 636 W HCPCS: Performed by: EMERGENCY MEDICINE

## 2022-09-07 PROCEDURE — 93010 ELECTROCARDIOGRAM REPORT: CPT | Mod: ,,, | Performed by: INTERNAL MEDICINE

## 2022-09-07 PROCEDURE — 93005 ELECTROCARDIOGRAM TRACING: CPT

## 2022-09-07 PROCEDURE — 11000001 HC ACUTE MED/SURG PRIVATE ROOM

## 2022-09-07 PROCEDURE — 99223 1ST HOSP IP/OBS HIGH 75: CPT | Mod: ,,, | Performed by: PODIATRIST

## 2022-09-07 PROCEDURE — 25000003 PHARM REV CODE 250: Performed by: EMERGENCY MEDICINE

## 2022-09-07 PROCEDURE — 96365 THER/PROPH/DIAG IV INF INIT: CPT

## 2022-09-07 PROCEDURE — 82962 GLUCOSE BLOOD TEST: CPT

## 2022-09-07 PROCEDURE — 85025 COMPLETE CBC W/AUTO DIFF WBC: CPT | Performed by: EMERGENCY MEDICINE

## 2022-09-07 PROCEDURE — A4216 STERILE WATER/SALINE, 10 ML: HCPCS | Performed by: PHYSICIAN ASSISTANT

## 2022-09-07 PROCEDURE — 99223 PR INITIAL HOSPITAL CARE,LEVL III: ICD-10-PCS | Mod: ,,, | Performed by: PODIATRIST

## 2022-09-07 RX ORDER — DIVALPROEX SODIUM 250 MG/1
500 TABLET, DELAYED RELEASE ORAL DAILY
Status: DISCONTINUED | OUTPATIENT
Start: 2022-09-08 | End: 2022-09-09 | Stop reason: HOSPADM

## 2022-09-07 RX ORDER — LISINOPRIL 5 MG/1
10 TABLET ORAL DAILY
Status: DISCONTINUED | OUTPATIENT
Start: 2022-09-08 | End: 2022-09-09 | Stop reason: HOSPADM

## 2022-09-07 RX ORDER — OXYCODONE HYDROCHLORIDE 5 MG/1
5 TABLET ORAL EVERY 6 HOURS PRN
Status: DISCONTINUED | OUTPATIENT
Start: 2022-09-07 | End: 2022-09-08

## 2022-09-07 RX ORDER — ENOXAPARIN SODIUM 100 MG/ML
40 INJECTION SUBCUTANEOUS EVERY 24 HOURS
Status: DISCONTINUED | OUTPATIENT
Start: 2022-09-07 | End: 2022-09-07

## 2022-09-07 RX ORDER — NAPROXEN SODIUM 220 MG/1
81 TABLET, FILM COATED ORAL DAILY
Status: DISCONTINUED | OUTPATIENT
Start: 2022-09-08 | End: 2022-09-09 | Stop reason: HOSPADM

## 2022-09-07 RX ORDER — DIVALPROEX SODIUM 250 MG/1
1250 TABLET, DELAYED RELEASE ORAL NIGHTLY
Status: DISCONTINUED | OUTPATIENT
Start: 2022-09-07 | End: 2022-09-09 | Stop reason: HOSPADM

## 2022-09-07 RX ORDER — SODIUM CHLORIDE 0.9 % (FLUSH) 0.9 %
10 SYRINGE (ML) INJECTION EVERY 8 HOURS
Status: DISCONTINUED | OUTPATIENT
Start: 2022-09-07 | End: 2022-09-09 | Stop reason: HOSPADM

## 2022-09-07 RX ORDER — HYDROXYZINE HYDROCHLORIDE 25 MG/1
50 TABLET, FILM COATED ORAL 4 TIMES DAILY PRN
Status: DISCONTINUED | OUTPATIENT
Start: 2022-09-07 | End: 2022-09-09 | Stop reason: HOSPADM

## 2022-09-07 RX ORDER — PRAVASTATIN SODIUM 40 MG/1
40 TABLET ORAL NIGHTLY
Status: DISCONTINUED | OUTPATIENT
Start: 2022-09-07 | End: 2022-09-09 | Stop reason: HOSPADM

## 2022-09-07 RX ORDER — GABAPENTIN 300 MG/1
600 CAPSULE ORAL 2 TIMES DAILY
Status: DISCONTINUED | OUTPATIENT
Start: 2022-09-07 | End: 2022-09-09 | Stop reason: HOSPADM

## 2022-09-07 RX ORDER — METOPROLOL TARTRATE 50 MG/1
50 TABLET ORAL 2 TIMES DAILY
Status: DISCONTINUED | OUTPATIENT
Start: 2022-09-07 | End: 2022-09-09 | Stop reason: HOSPADM

## 2022-09-07 RX ORDER — ACETAMINOPHEN 325 MG/1
650 TABLET ORAL EVERY 6 HOURS PRN
Status: DISCONTINUED | OUTPATIENT
Start: 2022-09-07 | End: 2022-09-09 | Stop reason: HOSPADM

## 2022-09-07 RX ORDER — OXYCODONE AND ACETAMINOPHEN 5; 325 MG/1; MG/1
1 TABLET ORAL
Status: COMPLETED | OUTPATIENT
Start: 2022-09-07 | End: 2022-09-07

## 2022-09-07 RX ORDER — FLUTICASONE FUROATE AND VILANTEROL 100; 25 UG/1; UG/1
1 POWDER RESPIRATORY (INHALATION) DAILY
Refills: 2 | Status: DISCONTINUED | OUTPATIENT
Start: 2022-09-08 | End: 2022-09-09 | Stop reason: HOSPADM

## 2022-09-07 RX ORDER — AMOXICILLIN 250 MG
1 CAPSULE ORAL 2 TIMES DAILY PRN
Status: DISCONTINUED | OUTPATIENT
Start: 2022-09-07 | End: 2022-09-09 | Stop reason: HOSPADM

## 2022-09-07 RX ORDER — BUMETANIDE 1 MG/1
1 TABLET ORAL DAILY
Status: DISCONTINUED | OUTPATIENT
Start: 2022-09-08 | End: 2022-09-09 | Stop reason: HOSPADM

## 2022-09-07 RX ORDER — OLANZAPINE 10 MG/1
10 TABLET ORAL EVERY 8 HOURS PRN
Status: DISCONTINUED | OUTPATIENT
Start: 2022-09-07 | End: 2022-09-09 | Stop reason: HOSPADM

## 2022-09-07 RX ORDER — RISPERIDONE 1 MG/1
3 TABLET ORAL 2 TIMES DAILY
Refills: 11 | Status: DISCONTINUED | OUTPATIENT
Start: 2022-09-07 | End: 2022-09-09 | Stop reason: HOSPADM

## 2022-09-07 RX ORDER — NALOXONE HCL 0.4 MG/ML
0.02 VIAL (ML) INJECTION
Status: DISCONTINUED | OUTPATIENT
Start: 2022-09-07 | End: 2022-09-09 | Stop reason: HOSPADM

## 2022-09-07 RX ORDER — TALC
6 POWDER (GRAM) TOPICAL NIGHTLY PRN
Status: DISCONTINUED | OUTPATIENT
Start: 2022-09-07 | End: 2022-09-09 | Stop reason: HOSPADM

## 2022-09-07 RX ADMIN — Medication 10 ML: at 11:09

## 2022-09-07 RX ADMIN — RISPERIDONE 3 MG: 1 TABLET ORAL at 11:09

## 2022-09-07 RX ADMIN — DIVALPROEX SODIUM 1250 MG: 250 TABLET, DELAYED RELEASE ORAL at 11:09

## 2022-09-07 RX ADMIN — GABAPENTIN 600 MG: 300 CAPSULE ORAL at 11:09

## 2022-09-07 RX ADMIN — Medication 6 MG: at 11:09

## 2022-09-07 RX ADMIN — METOPROLOL TARTRATE 50 MG: 50 TABLET, FILM COATED ORAL at 11:09

## 2022-09-07 RX ADMIN — PRAVASTATIN SODIUM 40 MG: 40 TABLET ORAL at 11:09

## 2022-09-07 RX ADMIN — OXYCODONE 5 MG: 5 TABLET ORAL at 09:09

## 2022-09-07 RX ADMIN — VANCOMYCIN HYDROCHLORIDE 2000 MG: 10 INJECTION, POWDER, LYOPHILIZED, FOR SOLUTION INTRAVENOUS at 05:09

## 2022-09-07 RX ADMIN — APIXABAN 5 MG: 5 TABLET, FILM COATED ORAL at 11:09

## 2022-09-07 RX ADMIN — OXYCODONE HYDROCHLORIDE AND ACETAMINOPHEN 1 TABLET: 5; 325 TABLET ORAL at 04:09

## 2022-09-07 RX ADMIN — HYDROXYZINE HYDROCHLORIDE 50 MG: 25 TABLET ORAL at 11:09

## 2022-09-07 NOTE — ED PROVIDER NOTES
Encounter Date: 9/7/2022       History     Chief Complaint   Patient presents with    Wound Check     Sent from [facility] for possible infection to left leg wound, also has increasing reddness & warmth spreading from area     Chief complaint: Wound    HPI:     52 year old female history of HTN, DM, HLD, CAD, COPD, DVT/PE on eliquis presenting for evaluation of 3 day history of 10/10 pain, redness, swelling to the L lower extremity. Pt followed by Dr. De Los Santos, podiatry every Friday and wound care every Wednesday for chronic wound 2/2 osteomyelitis with debridement in 5/2022.  Was sent wound care today due to increased drainage from the foot wound.  She has been compliant with doxycycline for 5 days after pain prescribed this by Podiatry for increased pain and drainage from the wound. She states the redness began to extend to her foot and lower leg in the past day.  Reports subjective fever.  Denies chills, nausea vomiting, CP, SOB, dizziness, lightheadedness, abdominal pain or other associated symptoms     The history is provided by the patient.   Review of patient's allergies indicates:   Allergen Reactions    Morphine Other (See Comments)     Patient had a psychotic episode after taking Morphine  Agitation, hallucinations    Penicillins Anaphylaxis     itching    Januvia [sitagliptin] Hives    Carbamazepine Other (See Comments)     hyponatremia     Past Medical History:   Diagnosis Date    ADHD (attention deficit hyperactivity disorder)     Arthritis     Asthma     Bipolar 1 disorder     Cataract     Cigarette smoker     COPD (chronic obstructive pulmonary disease)     Coronary artery disease     A fib    Depression     bipolar manic depresson    Diabetes mellitus     Diabetic foot ulcers     Diabetic neuropathy     DVT of lower extremity, bilateral 07/2013    bilateral LE DVT. [place] filter placed.     Encounter for blood transfusion     History of blood clots 1. Left Leg=2003; 2.Bilateral Groin=Blood Clots= 5 or 6/  " & 2013; 3. LLL of Lung=2013;  4. Lt. Lower Leg=2013.     Pt. had 1st Blood Clot after Rpykxdlvcedf=4573, & Last=. Couch Filter= Rt.Lateral Neck.    HTN (hypertension) 2013    Pt states that she does not have hypertension    Hypercholesteremia     Irregular heartbeat     Neuromuscular disorder     neuropathy feet    Obese     PE (pulmonary embolism) 2013    bilat LE DVT.     Restless leg syndrome      Past Surgical History:   Procedure Laterality Date    ABDOMINAL SURGERY      gastric sleeve    BILATERAL OOPHORECTOMY Bilateral 2015    CHOLECYSTECTOMY      DEBRIDEMENT OF FOOT Bilateral 5/10/2022    Procedure: DEBRIDEMENT, FOOT;  Surgeon: Maira De Los Santos DPM;  Location: [facility] OR;  Service: Podiatry;  Laterality: Bilateral;    Green' s filter Right 2012    Right Neck & Tunneled Down.    HERNIA REPAIR      "[place] of Hernias Repaires around th [place].", pt. states    LAPAROSCOPIC CHOLECYSTECTOMY N/A 9/10/2020    Procedure: CHOLECYSTECTOMY, LAPAROSCOPIC;  Surgeon: Montrell Gutierrez MD;  Location: [facility] OR;  Service: General;  Laterality: N/A;  RN PREOP ----COVID Negative      OVARIAN CYST REMOVAL  3/13/2014    ID REMOVAL OF OVARY/TUBE(S)      SPLENECTOMY, TOTAL  2003    TONSILLECTOMY      as a child    TYMPANOSTOMY TUBE PLACEMENT  1976    VEIN SURGERY      Lt leg     Family History   Problem Relation Age of Onset    Hypertension Father     Diabetes Father     Heart disease Father     Cataracts Father     Diabetes Paternal Grandfather     Heart disease Paternal Grandfather     No Known Problems Mother     Ovarian cancer Maternal Grandmother          from this. ? age     No Known Problems Sister     No Known Problems Brother     No Known Problems Maternal Aunt     No Known Problems Maternal Uncle     No Known Problems Paternal Aunt     No Known Problems Paternal Uncle     No Known Problems Maternal Grandfather     Ovarian cancer Paternal Grandmother     Uterine " cancer Neg Hx     Breast cancer Neg Hx     Colon cancer Neg Hx     Amblyopia Neg Hx     Blindness Neg Hx     Cancer Neg Hx     Glaucoma Neg Hx     Macular degeneration Neg Hx     Retinal detachment Neg Hx     Strabismus Neg Hx     Stroke Neg Hx     Thyroid disease Neg Hx      Social History     Tobacco Use    Smoking status: Every Day     Packs/day: 1.00     Years: 37.00     Pack years: 37.00     Types: Cigarettes     Last attempt to quit: 2020     Years since quittin.7    Smokeless tobacco: Never    Tobacco comments:     Enrolled in the [place] [employer] [facility] on 5/3/14 ([facility] Member ID # [id number]). Ambulatory referral to Smoking Cessation Program   Substance Use Topics    Alcohol use: No     Alcohol/week: 0.0 standard drinks    Drug use: No     Review of Systems   Constitutional:  Negative for chills and fever.   HENT:  Negative for congestion, ear pain, nosebleeds, rhinorrhea, sore throat and trouble swallowing.    Eyes:  Negative for redness.   Respiratory:  Negative for cough, shortness of breath and stridor.    Cardiovascular:  Positive for leg swelling. Negative for chest pain.   Gastrointestinal:  Negative for abdominal pain, constipation, diarrhea, nausea and vomiting.   Genitourinary:  Negative for decreased urine volume, dysuria, frequency, hematuria and urgency.   Musculoskeletal:  Negative for back pain and neck pain.   Skin:  Positive for wound. Negative for rash.   Neurological:  Negative for dizziness, speech difficulty, weakness, light-headedness, numbness and headaches.   Hematological:  Does not bruise/bleed easily.   Psychiatric/Behavioral:  Negative for confusion.      Physical Exam     Initial Vitals [22 1528]   BP Pulse Resp Temp SpO2   (!) 176/74 83 18 98.4 °F (36.9 °C) 98 %      MAP       --         Physical Exam    Nursing note and vitals reviewed.  Constitutional: She appears well-developed and well-nourished. No distress.   HENT:   Head: Normocephalic.   Right Ear: External ear  normal.   Left Ear: External ear normal.   Eyes: Conjunctivae are normal. Right eye exhibits no discharge. Left eye exhibits no discharge. No scleral icterus.   Neck: No tracheal deviation present.   Cardiovascular:  Normal rate and regular rhythm.           Pulmonary/Chest: No stridor. No respiratory distress.   Musculoskeletal:         General: Normal range of motion.     Neurological: She is alert.   Skin: Skin is warm and dry. No rash noted. No erythema.   Wound to plantar aspect of the L midfoot. Scant amount of brown/bloody drainage on the dressing. Erythema to the L foot extending into the proximal L leg   No fluctuance      Psychiatric: She has a normal mood and affect. Her behavior is normal. Judgment and thought content normal.       ED Course   Critical Care    Date/Time: 9/7/2022 9:42 PM  Performed by: Karla Ordoñez PA-C  Authorized by: Shaggy Sanchez MD   Direct patient critical care time: 5 minutes  Additional history critical care time: 5 minutes  Ordering / reviewing critical care time: 5 minutes  Documentation critical care time: 5 minutes  Consulting other physicians critical care time: 10 minutes  Total critical care time (exclusive of procedural time) : 30 minutes  Critical care time was exclusive of separately billable procedures and treating other patients and teaching time.  Critical care was time spent personally by me on the following activities: blood draw for specimens, discussions with consultants, discussions with primary provider, examination of patient, evaluation of patient's response to treatment, obtaining history from patient or surrogate, ordering and performing treatments and interventions, ordering and review of laboratory studies, ordering and review of radiographic studies, pulse oximetry, re-evaluation of patient's condition and review of old charts.      Labs Reviewed   CBC W/ AUTO DIFFERENTIAL - Abnormal; Notable for the following components:       Result Value     WBC 15.83 (*)     Hemoglobin 10.0 (*)     Hematocrit 30.2 (*)     MCV 73 (*)     MCH 24.3 (*)     RDW 18.5 (*)     Platelets 684 (*)     MPV 8.8 (*)     Immature Granulocytes 0.6 (*)     Immature Grans (Abs) 0.10 (*)     Lymph # 6.9 (*)     Mono # 1.9 (*)     All other components within normal limits   COMPREHENSIVE METABOLIC PANEL - Abnormal; Notable for the following components:    Sodium 129 (*)     Potassium 5.2 (*)     Chloride 92 (*)     Albumin 3.1 (*)     AST 9 (*)     ALT 8 (*)     All other components within normal limits   SEDIMENTATION RATE - Abnormal; Notable for the following components:    Sed Rate 37 (*)     All other components within normal limits   C-REACTIVE PROTEIN - Abnormal; Notable for the following components:    CRP 33.3 (*)     All other components within normal limits   POCT GLUCOSE - Abnormal; Notable for the following components:    POCT Glucose 117 (*)     All other components within normal limits   CULTURE, AEROBIC  (SPECIFY SOURCE)          Imaging Results              X-Ray Foot Complete Left (Final result)  Result time 09/07/22 17:29:35      Final result by Shalom Bro MD (09/07/22 17:29:35)                   Impression:      See above.      Electronically signed by: Shalom Bro MD  Date:    09/07/2022  Time:    17:29               Narrative:    EXAMINATION:  XR FOOT COMPLETE 3 VIEW LEFT    CLINICAL HISTORY:  .  Type 2 diabetes mellitus with foot ulcer    TECHNIQUE:  AP, lateral and oblique views of the left foot were performed.    COMPARISON:  04/02/2022.    FINDINGS:  Severe advanced degenerative changes are seen throughout the midfoot with chronic fragmentation which may relate to Charcot foot.  There is chronic Lisfranc deformity seen with lateral dislocation of the 2nd metatarsal in relation to the middle cuneiform.  No acute displaced fracture or dislocation seen.  Osteopenic changes are seen.  Prominent soft tissue swelling is seen over the dorsum of the foot.   Ulceration is seen at the plantar aspect of the midfoot.  Overall no significant change in the appearance of the foot from prior radiographs from 04/20/2022.                                       Medications   vancomycin - pharmacy to dose (has no administration in time range)   vancomycin (VANCOCIN) 1,750 mg in dextrose 5 % 500 mL IVPB (1,750 mg Intravenous Trough Due As Scheduled Before Dose 9/9/22 0430)   sodium chloride 0.9% flush 10 mL (has no administration in time range)   melatonin tablet 6 mg (has no administration in time range)   senna-docusate 8.6-50 mg per tablet 1 tablet (has no administration in time range)   acetaminophen tablet 650 mg (has no administration in time range)   naloxone 0.4 mg/mL injection 0.02 mg (has no administration in time range)   oxyCODONE immediate release tablet 5 mg (5 mg Oral Given 9/7/22 2118)   apixaban tablet 5 mg (has no administration in time range)   gabapentin capsule 600 mg (has no administration in time range)   fluticasone furoate-vilanteroL 100-25 mcg/dose diskus inhaler 1 puff (has no administration in time range)   hydrOXYzine HCL tablet 50 mg (has no administration in time range)   lisinopriL tablet 10 mg (has no administration in time range)   metoprolol tartrate (LOPRESSOR) tablet 50 mg (has no administration in time range)   OLANZapine tablet 10 mg (has no administration in time range)   pravastatin tablet 40 mg (has no administration in time range)   bumetanide tablet 1 mg (has no administration in time range)   aspirin chewable tablet 81 mg (has no administration in time range)   risperiDONE tablet 3 mg (has no administration in time range)   divalproex EC tablet 500 mg (has no administration in time range)   divalproex EC tablet 1,250 mg (has no administration in time range)   oxyCODONE-acetaminophen 5-325 mg per tablet 1 tablet (1 tablet Oral Given 9/7/22 1614)   vancomycin (VANCOCIN) 2,000 mg in dextrose 5 % 500 mL IVPB (0 mg Intravenous Stopped 9/7/22  1936)     Medical Decision Making:   Clinical Tests:   Lab Tests: Ordered and Reviewed  Radiological Study: Ordered and Reviewed  ED Management:  51 y/o F with history of DM and osteomyelitis in 5/2022 with debridement of L foot with chronic wound presenting for L foot pain.   Patient is afebrile nontoxic appearing no distress.  She reports associated subjective fever.  No chills.  Exam above. CBC with elevated white count.  ESR and CRP are also elevated.  No fever documented, not tachy or tachypneic. Considered but doubt sepsis at this time.   No neurovascular deficits. X-ray with no evidence of osteomyelitis.   Discussed pt with Dr. De Los Santos, podiatry who recommends wound culture and vanc/zosyn. Zosyn held due to history of PCN allergy.   Dr. De Los Santos discussed debridement/possible amputation with the pt.   Admitted in stable condition for IV abx and operative intervention.   Discussed with Dr. Sanchez who agrees with assessment and plan.                     Clinical Impression:   Final diagnoses:  [E11.621, L97.509] Diabetic foot ulcer  [E87.5] Hyperkalemia  [L03.116] Cellulitis of left lower extremity (Primary)        ED Disposition Condition    Admit                  Karla Ordoñez PA-C  09/07/22 [id number]

## 2022-09-07 NOTE — PROGRESS NOTES
Pharmacokinetic Initial Assessment: IV Vancomycin    Assessment/Plan:    Initiate intravenous vancomycin with loading dose of 2000 mg once followed by a maintenance dose of vancomycin 1750mg IV every 12 hours  Desired empiric serum trough concentration is 10 to 20 mcg/mL  Draw vancomycin trough level 60 min prior to fourth dose on 08/09/22 at approximately 05:00  Pharmacy will continue to follow and monitor vancomycin.      Please contact pharmacy at extension [phone] with any questions regarding this assessment.     Thank you for the consult,   Tomas Jefferson       Patient brief summary:  Audrey Natarajan is a 52 y.o. female initiated on antimicrobial therapy with IV Vancomycin for treatment of suspected skin & soft tissue infection    Drug Allergies:   Review of patient's allergies indicates:   Allergen Reactions    Morphine Other (See Comments)     Patient had a psychotic episode after taking Morphine  Agitation, hallucinations    Penicillins Anaphylaxis     itching    Januvia [sitagliptin] Hives    Carbamazepine Other (See Comments)     hyponatremia       Actual Body Weight:   106.6kg    Renal Function:   Estimated Creatinine Clearance: 118.7 mL/min (based on SCr of 0.7 mg/dL).,     Dialysis Method (if applicable):  N/A    CBC (last 72 hours):  Recent Labs   Lab Result Units 09/07/22  1531   WBC K/uL 15.83*   Hemoglobin g/dL 10.0*   Hematocrit % 30.2*   Platelets K/uL 684*   Gran % % 41.3   Lymph % % 43.7   Mono % % 11.7   Eosinophil % % 2.0   Basophil % % 0.7   Differential Method  Automated       Metabolic Panel (last 72 hours):  Recent Labs   Lab Result Units 09/07/22  1531   Sodium mmol/L 129*   Potassium mmol/L 5.2*   Chloride mmol/L 92*   CO2 mmol/L 28   Glucose mg/dL 102   BUN mg/dL 13   Creatinine mg/dL 0.7   Albumin g/dL 3.1*   Total Bilirubin mg/dL 0.1   Alkaline Phosphatase U/L 112   AST U/L 9*   ALT U/L 8*       Drug levels (last 3 results):  No results for input(s): OMAYRA PRESSLEY,  VANCOMYCINPE, VANCOPEAK, VANCOMYCINTR, VANCOTROUGH in the last 72 hours.    Microbiologic Results:  Microbiology Results (last 7 days)       Procedure Component Value Units Date/Time    Aerobic culture (Specify Source) **CANNOT BE ORDERED AS STAT** [[id number]] Collected: 09/07/22 1707    Order Status: Sent Specimen: Wound from Foot, Left Updated: 09/07/22 9447

## 2022-09-07 NOTE — PROGRESS NOTES
[facility]  [address]  [id number]  Nurse Visit    Subjective:       Patient seen in clinic today.  Patient c/o 8-9/10 pain to LLE x3 days. Patient states that she had to remove the tubigrip due to swelling and pain, states the pain is from bottom of left foot up to her knee. Wound dressing intact with large amount of drainage noted. LLE has erythema from top of calf to left foot with 4+ pitting edema and warmth noted. MD Wells informed with orders to send patient to ED to be further evaluated. Wound dressing applied (urgotul ag, drawtex, cast padding x2). Patient transported to ED via w/c.     Assessment:          Wound 04/15/22 2230 Diabetic Ulcer Left medial;plantar Foot (Active)   04/15/22 2230    Pre-existing: Yes   Primary Wound Type: Diabetic ulc   Side: Left   Orientation: medial;plantar   Location: Foot   Wound Number:    Ankle-Brachial Index:    Pulses:    Removal Indication and Assessment:    Wound Outcome:    (Retired) Wound Type:    (Retired) Wound Length (cm):    (Retired) Wound Width (cm):    (Retired) Depth (cm):    Wound Description (Comments):    Removal Indications:    Wound Image     09/07/22 1600   Wound WDL ex 09/07/22 1600   Dressing Appearance Intact;Moist drainage 09/07/22 1600   Drainage Amount Large 09/07/22 1600   Drainage Characteristics/Odor Serosanguineous 09/07/22 1600   Appearance Red 09/07/22 1600   Tissue loss description Partial thickness 09/07/22 1600   Black (%), Wound Tissue Color 0 % 09/07/22 1600   Red (%), Wound Tissue Color 100 % 09/07/22 1600   Yellow (%), Wound Tissue Color 0 % 09/07/22 1600   Periwound Area Macerated;Redness;Swelling 09/07/22 1600   Wound Edges Defined;Callused 09/07/22 1600   Care Cleansed with:;Soap and water;Sterile normal saline 09/07/22 1600   Dressing Changed 09/07/22 1600           Plan:     No orders of the defined types were placed in this encounter.          Follow up in about 2 days (around 9/9/2022)  for wound care visit.

## 2022-09-07 NOTE — FIRST PROVIDER EVALUATION
Medical screening exam completed.  I have conducted a focused provider triage encounter, findings are as follows:    Brief history of present illness:  51 yo female w/ Hx of DM presenting with painful swelling and redness of LLE. Reports she went to wound care today and was told there is concern for infection of the foot. Denies f/c. Was started on Abx last Friday by podiatry.    There were no vitals filed for this visit.    Pertinent physical exam:  Physical Exam  Vitals and nursing note reviewed.   Constitutional:       General: She is not in acute distress.     Appearance: Normal appearance. She is normal weight. She is not ill-appearing.   HENT:      Head: Normocephalic.      Nose: Nose normal. No congestion.      Mouth/Throat:      Mouth: Mucous membranes are moist.   Eyes:      General: No scleral icterus.     Pupils: Pupils are equal, round, and reactive to light.   Cardiovascular:      Rate and Rhythm: Normal rate and regular rhythm.      Heart sounds: No murmur heard.    No gallop.   Pulmonary:      Effort: Pulmonary effort is normal. No respiratory distress.      Breath sounds: Normal breath sounds. No wheezing.   Abdominal:      General: There is no distension.      Palpations: Abdomen is soft.      Tenderness: There is no guarding.   Musculoskeletal:         General: No deformity. Normal range of motion.      Cervical back: Normal range of motion.      Left lower leg: Edema present.      Comments: Redness, tenderness, warmth and edema of LLE. Bandages not taken down   Skin:     General: Skin is warm and dry.      Findings: No rash.   Neurological:      General: No focal deficit present.      Mental Status: She is alert and oriented to person, place, and time. Mental status is at baseline.   Psychiatric:         Mood and Affect: Mood normal.         Behavior: Behavior normal.         Brief workup plan:  labs, xray, exam of foot in room.    Preliminary workup initiated; this workup will be continued and  followed by the physician or advanced practice provider that is assigned to the patient when roomed.

## 2022-09-07 NOTE — ED TRIAGE NOTES
"Pt to the ED, sent over by wound care nurse, with complaints of "possible cellulitis" from the diabetic foot ulcer on her left foot. Pt states she has had the wound since March and that she sees wound care and the wound has been healing well. She recently started noticing yellowish drainage coming from the wound as well as increased warmth, redness and pain around the area. Pt denies chest pain, N/V/D, shortness of breath, fever/chills.  "

## 2022-09-08 PROBLEM — S91.302A OPEN WOUND OF LEFT FOOT: Status: ACTIVE | Noted: 2022-09-08

## 2022-09-08 LAB
ALBUMIN SERPL BCP-MCNC: 2.7 G/DL (ref 3.5–5.2)
ALP SERPL-CCNC: 103 U/L (ref 55–135)
ALT SERPL W/O P-5'-P-CCNC: 8 U/L (ref 10–44)
ANION GAP SERPL CALC-SCNC: 8 MMOL/L (ref 8–16)
AST SERPL-CCNC: 9 U/L (ref 10–40)
BASOPHILS # BLD AUTO: 0.09 K/UL (ref 0–0.2)
BASOPHILS NFR BLD: 0.8 % (ref 0–1.9)
BILIRUB SERPL-MCNC: 0.2 MG/DL (ref 0.1–1)
BUN SERPL-MCNC: 10 MG/DL (ref 6–20)
CALCIUM SERPL-MCNC: 8.7 MG/DL (ref 8.7–10.5)
CHLORIDE SERPL-SCNC: 92 MMOL/L (ref 95–110)
CO2 SERPL-SCNC: 28 MMOL/L (ref 23–29)
CREAT SERPL-MCNC: 0.6 MG/DL (ref 0.5–1.4)
DIFFERENTIAL METHOD: ABNORMAL
EOSINOPHIL # BLD AUTO: 0.4 K/UL (ref 0–0.5)
EOSINOPHIL NFR BLD: 3.4 % (ref 0–8)
ERYTHROCYTE [DISTWIDTH] IN BLOOD BY AUTOMATED COUNT: 18.2 % (ref 11.5–14.5)
EST. GFR  (NO RACE VARIABLE): >60 ML/MIN/1.73 M^2
ESTIMATED AVG GLUCOSE: 214 MG/DL (ref 68–131)
GLUCOSE SERPL-MCNC: 107 MG/DL (ref 70–110)
HBA1C MFR BLD: 9.1 % (ref 4–5.6)
HCT VFR BLD AUTO: 29.1 % (ref 37–48.5)
HGB BLD-MCNC: 9.3 G/DL (ref 12–16)
IMM GRANULOCYTES # BLD AUTO: 0.06 K/UL (ref 0–0.04)
IMM GRANULOCYTES NFR BLD AUTO: 0.6 % (ref 0–0.5)
LYMPHOCYTES # BLD AUTO: 4.7 K/UL (ref 1–4.8)
LYMPHOCYTES NFR BLD: 43 % (ref 18–48)
MAGNESIUM SERPL-MCNC: 1.6 MG/DL (ref 1.6–2.6)
MCH RBC QN AUTO: 23.3 PG (ref 27–31)
MCHC RBC AUTO-ENTMCNC: 32 G/DL (ref 32–36)
MCV RBC AUTO: 73 FL (ref 82–98)
MONOCYTES # BLD AUTO: 1.3 K/UL (ref 0.3–1)
MONOCYTES NFR BLD: 12.4 % (ref 4–15)
NEUTROPHILS # BLD AUTO: 4.3 K/UL (ref 1.8–7.7)
NEUTROPHILS NFR BLD: 39.8 % (ref 38–73)
NRBC BLD-RTO: 0 /100 WBC
PLATELET # BLD AUTO: 673 K/UL (ref 150–450)
PMV BLD AUTO: 8.7 FL (ref 9.2–12.9)
POCT GLUCOSE: 101 MG/DL (ref 70–110)
POCT GLUCOSE: 149 MG/DL (ref 70–110)
POCT GLUCOSE: 194 MG/DL (ref 70–110)
POCT GLUCOSE: 215 MG/DL (ref 70–110)
POTASSIUM SERPL-SCNC: 4.5 MMOL/L (ref 3.5–5.1)
PROT SERPL-MCNC: 6.3 G/DL (ref 6–8.4)
RBC # BLD AUTO: 4 M/UL (ref 4–5.4)
SODIUM SERPL-SCNC: 128 MMOL/L (ref 136–145)
WBC # BLD AUTO: 10.81 K/UL (ref 3.9–12.7)

## 2022-09-08 PROCEDURE — A4216 STERILE WATER/SALINE, 10 ML: HCPCS | Performed by: PHYSICIAN ASSISTANT

## 2022-09-08 PROCEDURE — 99233 PR SUBSEQUENT HOSPITAL CARE,LEVL III: ICD-10-PCS | Mod: ,,, | Performed by: PODIATRIST

## 2022-09-08 PROCEDURE — 99233 PR SUBSEQUENT HOSPITAL CARE,LEVL III: ICD-10-PCS | Mod: ,,, | Performed by: INTERNAL MEDICINE

## 2022-09-08 PROCEDURE — 63600175 PHARM REV CODE 636 W HCPCS: Performed by: STUDENT IN AN ORGANIZED HEALTH CARE EDUCATION/TRAINING PROGRAM

## 2022-09-08 PROCEDURE — 25000003 PHARM REV CODE 250: Performed by: EMERGENCY MEDICINE

## 2022-09-08 PROCEDURE — 11000001 HC ACUTE MED/SURG PRIVATE ROOM

## 2022-09-08 PROCEDURE — 63600175 PHARM REV CODE 636 W HCPCS: Performed by: EMERGENCY MEDICINE

## 2022-09-08 PROCEDURE — 99233 SBSQ HOSP IP/OBS HIGH 50: CPT | Mod: ,,, | Performed by: INTERNAL MEDICINE

## 2022-09-08 PROCEDURE — 25000003 PHARM REV CODE 250: Performed by: STUDENT IN AN ORGANIZED HEALTH CARE EDUCATION/TRAINING PROGRAM

## 2022-09-08 PROCEDURE — 36415 COLL VENOUS BLD VENIPUNCTURE: CPT | Performed by: STUDENT IN AN ORGANIZED HEALTH CARE EDUCATION/TRAINING PROGRAM

## 2022-09-08 PROCEDURE — 36415 COLL VENOUS BLD VENIPUNCTURE: CPT | Performed by: PHYSICIAN ASSISTANT

## 2022-09-08 PROCEDURE — 25000003 PHARM REV CODE 250: Performed by: PHYSICIAN ASSISTANT

## 2022-09-08 PROCEDURE — A9585 GADOBUTROL INJECTION: HCPCS | Performed by: STUDENT IN AN ORGANIZED HEALTH CARE EDUCATION/TRAINING PROGRAM

## 2022-09-08 PROCEDURE — 25000242 PHARM REV CODE 250 ALT 637 W/ HCPCS: Performed by: PHYSICIAN ASSISTANT

## 2022-09-08 PROCEDURE — 83036 HEMOGLOBIN GLYCOSYLATED A1C: CPT | Performed by: STUDENT IN AN ORGANIZED HEALTH CARE EDUCATION/TRAINING PROGRAM

## 2022-09-08 PROCEDURE — 80053 COMPREHEN METABOLIC PANEL: CPT | Performed by: PHYSICIAN ASSISTANT

## 2022-09-08 PROCEDURE — 94761 N-INVAS EAR/PLS OXIMETRY MLT: CPT

## 2022-09-08 PROCEDURE — 83735 ASSAY OF MAGNESIUM: CPT | Performed by: PHYSICIAN ASSISTANT

## 2022-09-08 PROCEDURE — 99233 SBSQ HOSP IP/OBS HIGH 50: CPT | Mod: ,,, | Performed by: PODIATRIST

## 2022-09-08 PROCEDURE — 94640 AIRWAY INHALATION TREATMENT: CPT

## 2022-09-08 PROCEDURE — 25500020 PHARM REV CODE 255: Performed by: STUDENT IN AN ORGANIZED HEALTH CARE EDUCATION/TRAINING PROGRAM

## 2022-09-08 PROCEDURE — 99900035 HC TECH TIME PER 15 MIN (STAT)

## 2022-09-08 PROCEDURE — 85025 COMPLETE CBC W/AUTO DIFF WBC: CPT | Performed by: PHYSICIAN ASSISTANT

## 2022-09-08 RX ORDER — INSULIN ASPART 100 [IU]/ML
0-5 INJECTION, SOLUTION INTRAVENOUS; SUBCUTANEOUS EVERY 6 HOURS PRN
Status: DISCONTINUED | OUTPATIENT
Start: 2022-09-08 | End: 2022-09-09 | Stop reason: HOSPADM

## 2022-09-08 RX ORDER — HYDROCODONE BITARTRATE AND ACETAMINOPHEN 10; 325 MG/1; MG/1
1 TABLET ORAL EVERY 6 HOURS PRN
Status: DISCONTINUED | OUTPATIENT
Start: 2022-09-08 | End: 2022-09-09 | Stop reason: HOSPADM

## 2022-09-08 RX ORDER — GADOBUTROL 604.72 MG/ML
10 INJECTION INTRAVENOUS
Status: DISCONTINUED | OUTPATIENT
Start: 2022-09-08 | End: 2022-09-08 | Stop reason: CLARIF

## 2022-09-08 RX ORDER — OXYCODONE HYDROCHLORIDE 5 MG/1
5 TABLET ORAL EVERY 6 HOURS PRN
Status: DISCONTINUED | OUTPATIENT
Start: 2022-09-08 | End: 2022-09-09 | Stop reason: HOSPADM

## 2022-09-08 RX ORDER — GLUCAGON 1 MG
1 KIT INJECTION
Status: DISCONTINUED | OUTPATIENT
Start: 2022-09-08 | End: 2022-09-09 | Stop reason: HOSPADM

## 2022-09-08 RX ORDER — IPRATROPIUM BROMIDE AND ALBUTEROL SULFATE 2.5; .5 MG/3ML; MG/3ML
3 SOLUTION RESPIRATORY (INHALATION) EVERY 4 HOURS PRN
Status: DISCONTINUED | OUTPATIENT
Start: 2022-09-08 | End: 2022-09-09 | Stop reason: HOSPADM

## 2022-09-08 RX ORDER — MUPIROCIN 20 MG/G
OINTMENT TOPICAL 2 TIMES DAILY
Status: DISCONTINUED | OUTPATIENT
Start: 2022-09-08 | End: 2022-09-09 | Stop reason: HOSPADM

## 2022-09-08 RX ORDER — HYDROCODONE BITARTRATE AND ACETAMINOPHEN 7.5; 325 MG/1; MG/1
1 TABLET ORAL EVERY 6 HOURS PRN
Status: DISCONTINUED | OUTPATIENT
Start: 2022-09-08 | End: 2022-09-09 | Stop reason: HOSPADM

## 2022-09-08 RX ORDER — GADOBUTROL 604.72 MG/ML
7.5 INJECTION INTRAVENOUS
Status: COMPLETED | OUTPATIENT
Start: 2022-09-08 | End: 2022-09-08

## 2022-09-08 RX ORDER — SODIUM CHLORIDE 9 MG/ML
INJECTION, SOLUTION INTRAVENOUS CONTINUOUS
Status: DISCONTINUED | OUTPATIENT
Start: 2022-09-08 | End: 2022-09-09 | Stop reason: HOSPADM

## 2022-09-08 RX ORDER — LEVOFLOXACIN 5 MG/ML
750 INJECTION, SOLUTION INTRAVENOUS
Status: DISCONTINUED | OUTPATIENT
Start: 2022-09-08 | End: 2022-09-09 | Stop reason: HOSPADM

## 2022-09-08 RX ADMIN — OXYCODONE 5 MG: 5 TABLET ORAL at 05:09

## 2022-09-08 RX ADMIN — METOPROLOL TARTRATE 50 MG: 50 TABLET, FILM COATED ORAL at 08:09

## 2022-09-08 RX ADMIN — MUPIROCIN: 20 OINTMENT TOPICAL at 08:09

## 2022-09-08 RX ADMIN — Medication 10 ML: at 02:09

## 2022-09-08 RX ADMIN — PRAVASTATIN SODIUM 40 MG: 40 TABLET ORAL at 08:09

## 2022-09-08 RX ADMIN — SODIUM CHLORIDE: 0.9 INJECTION, SOLUTION INTRAVENOUS at 08:09

## 2022-09-08 RX ADMIN — HYDROXYZINE HYDROCHLORIDE 50 MG: 25 TABLET ORAL at 08:09

## 2022-09-08 RX ADMIN — ASPIRIN 81 MG CHEWABLE TABLET 81 MG: 81 TABLET CHEWABLE at 08:09

## 2022-09-08 RX ADMIN — DIVALPROEX SODIUM 1250 MG: 250 TABLET, DELAYED RELEASE ORAL at 08:09

## 2022-09-08 RX ADMIN — GADOBUTROL 7.5 ML: 604.72 INJECTION INTRAVENOUS at 11:09

## 2022-09-08 RX ADMIN — GABAPENTIN 600 MG: 300 CAPSULE ORAL at 08:09

## 2022-09-08 RX ADMIN — FLUTICASONE FUROATE AND VILANTEROL TRIFENATATE 1 PUFF: 100; 25 POWDER RESPIRATORY (INHALATION) at 08:09

## 2022-09-08 RX ADMIN — Medication 10 ML: at 05:09

## 2022-09-08 RX ADMIN — VANCOMYCIN HYDROCHLORIDE 1750 MG: 500 INJECTION, POWDER, LYOPHILIZED, FOR SOLUTION INTRAVENOUS at 06:09

## 2022-09-08 RX ADMIN — LISINOPRIL 10 MG: 5 TABLET ORAL at 08:09

## 2022-09-08 RX ADMIN — HYDROCODONE BITARTRATE AND ACETAMINOPHEN 1 TABLET: 10; 325 TABLET ORAL at 11:09

## 2022-09-08 RX ADMIN — VANCOMYCIN HYDROCHLORIDE 1750 MG: 500 INJECTION, POWDER, LYOPHILIZED, FOR SOLUTION INTRAVENOUS at 05:09

## 2022-09-08 RX ADMIN — Medication 6 MG: at 08:09

## 2022-09-08 RX ADMIN — APIXABAN 5 MG: 5 TABLET, FILM COATED ORAL at 08:09

## 2022-09-08 RX ADMIN — OXYCODONE 5 MG: 5 TABLET ORAL at 04:09

## 2022-09-08 RX ADMIN — RISPERIDONE 3 MG: 1 TABLET ORAL at 08:09

## 2022-09-08 RX ADMIN — LEVOFLOXACIN 750 MG: 750 INJECTION, SOLUTION INTRAVENOUS at 08:09

## 2022-09-08 RX ADMIN — BUMETANIDE 1 MG: 1 TABLET ORAL at 08:09

## 2022-09-08 RX ADMIN — DIVALPROEX SODIUM 500 MG: 250 TABLET, DELAYED RELEASE ORAL at 08:09

## 2022-09-08 NOTE — HPI
"52M with h/o DM with charcot foot, DVT on Eliquis, PAD, tobacco abuse admitted 9/7 with worsening L foot wound. Notably, pt was seen by ID 5/2022 for b/l foot OM/mrsa bacteremia and recommended 6 weeks iv vancomycin, but patient never came for follow up. Pt reports L leg wound has worsened over past few days. Denies f/c. Notably patient has refused amputation in the past. Says she completed prior iv abx and didn't come for f/u because she was admitted. Says wound healed with last course of antibiotics and recently got worse      JEYSON with  hemodynamically significant stenosis in the left and DPA. Multifocal mild to moderate grade stenoses is suspected throughout the left MIRACLE and, bilateral posterior tibial and peroneal arteries.      L foot wound swab  Specimen Information: Foot, Left; Wound   0 Result Notes  Component 1 d ago    Aerobic Bacterial Culture  Abnormal   STREPTOCOCCUS GROUP G   Many   Beta-hemolytic streptococci are routinely susceptible to   penicillins,cephalosporins and carbapenems.   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     Plantar soft tissue ulceration with prominent surrounding soft tissue inflammatory changes. No clear evidence of osteomyelitis with the adjacent midfoot.  No fluid collections    ID consulted for "Per podiatry for further evaluation of long term abx therapy with wound infection"       "

## 2022-09-08 NOTE — ASSESSMENT & PLAN NOTE
-Continue home meds  - home meds: lisinopril 10 mg QD, metoprolol tartrate 50 mg BID  - monitor

## 2022-09-08 NOTE — HPI
Ms. Audrey Natarajan is a 52 y.o. female, with PMH of T2DM, Charcot's foot, osteomyelitis of the foot, diabetic foot ulcer, b/l LE DVTs (on Eliquis), MRSA, PAD, h/o PE, COPD, HTN, Bipolar I, tobacco abuse, who presented to [facility] ED on 9/7/22 for a wound check of her left leg where she had a wound with worsening redness and warmth x 3 days. She notes associated pain. She follows with Dr. De Los Santos (Podiatry) every Friday, and Wound Care every Wednesday. She was evaluated in the ED with labs showing leukocytosis of 15k with left shift, anemia with H&H of 10.0/30.2. A metabolic panel showed sodium of 129, with potassium of 5.2. Inflammatory markers were elevated with ESR of 37, and CRP of 33.3. An x-ray of the foot showed degenerative changes in the midfoot of chronic issues related to Charcot foot as well as other chronic findings and soft tissue swelling over the dorsum of the foot, and ulceration of the plantar surface of the midfoot. All of these findings were without significant change from prior imaging on 4/20/22. She was treated in the ED with vancomycin. She was admitted to inpatient status.

## 2022-09-08 NOTE — H&P
[address] [place] [address] Medicine  History & Physical    Patient Name: Audrey Natarajan  MRN: [id number]  Patient Class: IP- Inpatient  Admission Date: 9/7/2022  Attending Physician: Velvet Galicia DO   Primary Care Provider: Donaldo Pena MD         Patient information was obtained from patient, past medical records and ER records.     Subjective:     Principal Problem:Cellulitis of left lower extremity    Chief Complaint:   Chief Complaint   Patient presents with    Wound Check     Sent from [facility] for possible infection to left leg wound, also has increasing reddness & warmth spreading from area        HPI: Ms. Audrey Natarajan is a 52 y.o. female, with PMH of T2DM, Charcot's foot, osteomyelitis of the foot, diabetic foot ulcer, b/l LE DVTs (on Eliquis), MRSA, PAD, h/o PE, COPD, HTN, Bipolar I, tobacco abuse, who presented to [facility] ED on 9/7/22 for a wound check of her left leg where she had a wound with worsening redness and warmth x 3 days. She notes associated pain. She follows with Dr. De Los Santos (Podiatry) every Friday, and Wound Care every Wednesday. She was evaluated in the ED with labs showing leukocytosis of 15k with left shift, anemia with H&H of 10.0/30.2. A metabolic panel showed sodium of 129, with potassium of 5.2. Inflammatory markers were elevated with ESR of 37, and CRP of 33.3. An x-ray of the foot showed degenerative changes in the midfoot of chronic issues related to Charcot foot as well as other chronic findings and soft tissue swelling over the dorsum of the foot, and ulceration of the plantar surface of the midfoot. All of these findings were without significant change from prior imaging on 4/20/22. She was treated in the ED with vancomycin. She was admitted to inpatient status.       Past Medical History:   Diagnosis Date    ADHD (attention deficit hyperactivity disorder)     Arthritis     Asthma     Bipolar 1 disorder     Cataract     Cigarette smoker     COPD (chronic  "obstructive pulmonary disease)     Coronary artery disease     A fib    Depression     bipolar manic depresson    Diabetes mellitus     Diabetic foot ulcers     Diabetic neuropathy     DVT of lower extremity, bilateral 07/2013    bilateral LE DVT. Estelita filter placed.     Encounter for blood transfusion     History of blood clots 1. Left Leg=2003; 2.Bilateral Groin=Blood Clots= 5 or 6/ 2013 & 7/2013; 3. LLL of Lung=7/2013;  4. Lt. Lower Leg=7/2013.     Pt. had 1st Blood Clot after Ackamdlpuptz=2915, & Last=2013. Estelita Filter= Rt.Lateral Neck.    HTN (hypertension) 06/06/2013    Pt states that she does not have hypertension    Hypercholesteremia     Irregular heartbeat     Neuromuscular disorder     neuropathy feet    Obese     PE (pulmonary embolism) 07/2013    bilat LE DVT.     Restless leg syndrome        Past Surgical History:   Procedure Laterality Date    ABDOMINAL SURGERY  2010    gastric sleeve    BILATERAL OOPHORECTOMY Bilateral 1/12/2015    CHOLECYSTECTOMY      DEBRIDEMENT OF FOOT Bilateral 5/10/2022    Procedure: DEBRIDEMENT, FOOT;  Surgeon: Maira De Los Santos DPM;  Location: [facility] OR;  Service: Podiatry;  Laterality: Bilateral;    Green' s filter Right 7/4/2012    Right Neck & Tunneled Down.    HERNIA REPAIR      "[place] [facility] Hernias Repaires around th [place].", pt. states    LAPAROSCOPIC CHOLECYSTECTOMY N/A 9/10/2020    Procedure: CHOLECYSTECTOMY, LAPAROSCOPIC;  Surgeon: Montrell Gutierrez MD;  Location: [facility] OR;  Service: General;  Laterality: N/A;  RN PREOP 9/9----COVID Negative  9/9    OVARIAN CYST REMOVAL  3/13/2014    CA REMOVAL OF OVARY/TUBE(S)      SPLENECTOMY, TOTAL  July 2003    TONSILLECTOMY      as a child    TYMPANOSTOMY TUBE PLACEMENT  1976    VEIN SURGERY  2003    Lt leg       Review of patient's allergies indicates:   Allergen Reactions    Morphine Other (See Comments)     Patient had a psychotic episode after taking Morphine  Agitation, hallucinations "    Penicillins Anaphylaxis     itching    Januvia [sitagliptin] Hives    Carbamazepine Other (See Comments)     hyponatremia       No current facility-administered medications on file prior to encounter.     Current Outpatient Medications on File Prior to Encounter   Medication Sig    acetaminophen (TYLENOL) 500 MG tablet Take 2 tablets (1,000 mg total) by mouth every 6 (six) hours as needed for Pain.    albuterol-ipratropium (DUO-NEB) 2.5 mg-0.5 mg/3 mL nebulizer solution Take 3 mLs by nebulization every 6 (six) hours as needed for Wheezing or Shortness of Breath. Rescue    apixaban (ELIQUIS) 5 mg Tab Take 1 tablet (5 mg total) by mouth 2 (two) times daily.    blood sugar diagnostic Strp To check BG one time daily, to use with insurance preferred meter    blood-glucose meter kit To check BG one time daily, to use with insurance preferred meter    bumetanide (BUMEX) 1 MG tablet Take 1 tablet (1 mg total) by mouth once daily.    divalproex (DEPAKOTE) 250 MG EC tablet Take 5 tablets (1,250 mg total) by mouth every evening.    divalproex (DEPAKOTE) 500 MG TbEC Take 1 tablet (500 mg total) by mouth once daily. PO QAM    doxycycline (VIBRA-TABS) 100 MG tablet Take 1 tablet (100 mg total) by mouth 2 (two) times daily.    fluticasone propionate (FLONASE) 50 mcg/actuation nasal spray 2 sprays (100 mcg total) by Each Nostril route daily as needed (Nasal congestion).    fluticasone-salmeterol diskus inhaler 250-50 mcg Inhale 1 puff into the lungs 2 (two) times daily. Controller    gabapentin (NEURONTIN) 300 MG capsule Take 2 capsules (600 mg total) by mouth 2 (two) times daily.    lancets Misc To check BG one time daily, to use with insurance preferred meter    lisinopriL 10 MG tablet Take 1 tablet (10 mg total) by mouth once daily.    loratadine (CLARITIN) 10 mg tablet Take 1 tablet (10 mg total) by mouth once daily.    metFORMIN (GLUCOPHAGE) 1000 MG tablet Take 1 tablet (1,000 mg total) by mouth 2 (two)  times daily with meals.    metoprolol tartrate (LOPRESSOR) 50 MG tablet TAKE 1 TABLET(50 MG) BY MOUTH TWICE DAILY    miconazole nitrate, bulk, Powd Apply to clean dry skin twice daily    multivitamin Tab Take 1 tablet by mouth once daily.    OLANZapine (ZYPREXA) 10 MG tablet Take 1 tablet (10 mg total) by mouth every 8 (eight) hours as needed (Agitation).    pantoprazole (PROTONIX) 40 MG tablet Take 1 tablet (40 mg total) by mouth once daily.    potassium chloride (MICRO-K) 10 MEQ CpSR Take 1 capsule (10 mEq total) by mouth once daily.    pravastatin (PRAVACHOL) 40 MG tablet Take 1 tablet (40 mg total) by mouth every evening.    risperiDONE (RISPERDAL M-TABS) 3 MG disintegrating tablet Take 1 tablet (3 mg total) by mouth 2 (two) times daily.    ammonium lactate (LAC-HYDRIN) 12 % lotion APPL Y ONCE TOPICALLY TWICE DAILY FOR 30 DAYS    aspirin 81 MG Chew Take 1 tablet (81 mg total) by mouth once daily.    carBAMazepine (TEGRETOL) 200 mg tablet Take 400 mg by mouth 2 (two) times daily.    cetirizine (ZYRTEC) 10 MG tablet Take 1 tablet (10 mg total) by mouth once daily. for 10 days    DUPIXENT  mg/2 mL PnIj Inject into the skin.    hydrOXYzine (ATARAX) 50 MG tablet Take 50 mg by mouth 4 (four) times daily as needed.    [DISCONTINUED] albuterol (PROVENTIL/VENTOLIN HFA) 90 mcg/actuation inhaler Inhale 2 puffs into the lungs every 6 (six) hours as needed for Wheezing. Use with spacer  Dispense with 1 spacer    [DISCONTINUED] diclofenac sodium (VOLTAREN) 1 % Gel Apply 2 g topically 4 (four) times daily as needed (Apply to painful area up to 4 times a day as needed for pain). Apply to painful area 4 times a day as needed for pain    [DISCONTINUED] furosemide (LASIX) 20 MG tablet TAKE 1 TABLET(20 MG) BY MOUTH EVERY DAY    [DISCONTINUED] nystatin (NYSTOP) powder APPLY TOPICALLY TO AXILLA AND BREAST FOLDS TWICE DAILY    [DISCONTINUED] QUEtiapine (SEROQUEL) 200 MG Tab Take 1 tablet (200 mg total) by  mouth before breakfast.     Family History       Problem Relation (Age of Onset)    Cataracts Father    Diabetes Father, Paternal Grandfather    Heart disease Father, Paternal Grandfather    Hypertension Father    No Known Problems Mother, Sister, Brother, Maternal Aunt, Maternal Uncle, Paternal Aunt, Paternal Uncle, Maternal Grandfather    Ovarian cancer Maternal Grandmother, Paternal Grandmother          Tobacco Use    Smoking status: Every Day     Packs/day: 1.00     Years: 37.00     Pack years: 37.00     Types: Cigarettes     Last attempt to quit: 2020     Years since quittin.7    Smokeless tobacco: Never    Tobacco comments:     Enrolled in the [employer] on 5/3/14 ([facility] Member ID # [id number]). Ambulatory referral to Smoking Cessation Program   Substance and Sexual Activity    Alcohol use: No     Alcohol/week: 0.0 standard drinks    Drug use: No    Sexual activity: Yes     Partners: Male     Review of Systems   Constitutional:  Negative for appetite change, chills, diaphoresis and fever.   Respiratory:  Negative for cough, shortness of breath and wheezing.    Cardiovascular:  Negative for chest pain and palpitations.   Gastrointestinal:  Negative for abdominal pain, constipation, diarrhea, nausea and vomiting.   Genitourinary:  Negative for dysuria, flank pain, frequency, hematuria and urgency.   Musculoskeletal:  Negative for arthralgias, back pain, myalgias, neck pain and neck stiffness.   Skin:  Positive for color change and wound. Negative for pallor and rash.   Neurological:  Negative for dizziness, weakness, light-headedness and headaches.   Psychiatric/Behavioral:  Negative for agitation and confusion.    Objective:     Vital Signs (Most Recent):  Temp: 98.6 °F (37 °C) (22)  Pulse: 85 (22)  Resp: 19 (22)  BP: 134/69 (22)  SpO2: 99 % (22) Vital Signs (24h Range):  Temp:  [96.7 °F (35.9 °C)-98.6 °F (37 °C)] 98.6 °F (37 °C)  Pulse:   [83-88] 85  Resp:  [18-20] 19  SpO2:  [98 %-99 %] 99 %  BP: (134-176)/(69-74) 134/69     Weight: 106.6 kg (235 lb)  Body mass index is 37.93 kg/m².    Physical Exam  Vitals and nursing note reviewed.   Constitutional:       General: She is not in acute distress.     Appearance: She is well-developed. She is obese. She is not ill-appearing, toxic-appearing or diaphoretic.   HENT:      Head: Normocephalic and atraumatic.   Eyes:      General: No scleral icterus.        Right eye: No discharge.         Left eye: No discharge.      Conjunctiva/sclera: Conjunctivae normal.   Neck:      Trachea: No tracheal deviation.   Cardiovascular:      Rate and Rhythm: Normal rate and regular rhythm.      Heart sounds: Normal heart sounds. No murmur heard.    No gallop.   Pulmonary:      Effort: Pulmonary effort is normal. No respiratory distress.      Breath sounds: Normal breath sounds. No stridor. No wheezing or rales.   Abdominal:      General: Bowel sounds are normal. There is no distension.      Palpations: Abdomen is soft. There is no mass.      Tenderness: There is no abdominal tenderness. There is no guarding.   Musculoskeletal:         General: Deformity (Charcot's deformity of left foot) present. Normal range of motion.      Cervical back: Normal range of motion and neck supple.   Skin:     General: Skin is warm and dry.      Coloration: Skin is not pale.      Findings: Erythema (distal LLE extending into foot) present. No rash.   Neurological:      General: No focal deficit present.      Mental Status: She is alert and oriented to person, place, and time.      Cranial Nerves: No cranial nerve deficit.      Motor: No abnormal muscle tone.   Psychiatric:         Mood and Affect: Mood normal.         Behavior: Behavior normal.         Thought Content: Thought content normal.         Judgment: Judgment normal.           Significant Labs: All pertinent labs within the past 24 hours have been reviewed.  BMP:   Recent Labs   Lab  09/07/22  1531      *   K 5.2*   CL 92*   CO2 28   BUN 13   CREATININE 0.7   CALCIUM 9.3     CBC:   Recent Labs   Lab 09/07/22  1531   WBC 15.83*   HGB 10.0*   HCT 30.2*   *     CMP:   Recent Labs   Lab 09/07/22  1531   *   K 5.2*   CL 92*   CO2 28      BUN 13   CREATININE 0.7   CALCIUM 9.3   PROT 7.1   ALBUMIN 3.1*   BILITOT 0.1   ALKPHOS 112   AST 9*   ALT 8*   ANIONGAP 9     Urine Culture: No results for input(s): LABURIN in the last 48 hours.  Urine Studies: No results for input(s): COLORU, APPEARANCEUA, PHUR, SPECGRAV, PROTEINUA, GLUCUA, KETONESU, BILIRUBINUA, OCCULTUA, NITRITE, UROBILINOGEN, LEUKOCYTESUR, RBCUA, WBCUA, BACTERIA, SQUAMEPITHEL, HYALINECASTS in the last 48 hours.    Invalid input(s): WRIGHTSUR    Significant Imaging: I have reviewed all pertinent imaging results/findings within the past 24 hours.  Imaging Results              X-Ray Foot Complete Left (Final result)  Result time 09/07/22 17:29:35      Final result by Shalom Bro MD (09/07/22 17:29:35)                   Impression:      See above.      Electronically signed by: Shalom Bro MD  Date:    09/07/2022  Time:    17:29               Narrative:    EXAMINATION:  XR FOOT COMPLETE 3 VIEW LEFT    CLINICAL HISTORY:  .  Type 2 diabetes mellitus with foot ulcer    TECHNIQUE:  AP, lateral and oblique views of the left foot were performed.    COMPARISON:  04/02/2022.    FINDINGS:  Severe advanced degenerative changes are seen throughout the midfoot with chronic fragmentation which may relate to Charcot foot.  There is chronic Lisfranc deformity seen with lateral dislocation of the 2nd metatarsal in relation to the middle cuneiform.  No acute displaced fracture or dislocation seen.  Osteopenic changes are seen.  Prominent soft tissue swelling is seen over the dorsum of the foot.  Ulceration is seen at the plantar aspect of the midfoot.  Overall no significant change in the appearance of the foot from prior  radiographs from 04/20/2022.                                        Assessment/Plan:     * Cellulitis of left lower extremity  - Ms. Audrey Natarajan presents with redness, warmth and swelling of the left foot extending into the distal LLE   - h/o MRSA infection of the left foot as well as osteomyelitis of the left foot   - inflammatory markers are elevated   - x-ray without evidence for osteomyelitis   - s/p vancomycin in ED, continue   - monitor         Type 2 diabetes mellitus  - last A1C:   Lab Results   Component Value Date    HGBA1C 7.0 (H) 04/13/2022   - hold oral antidiabetic meds   - Diabetic diet   - SSI with accuchecks AC/HS    Anti-hyperglycemic dose as follows-   Antihyperglycemics (From admission, onward)    Start     Stop Route Frequency Ordered    09/08/22 0847  insulin aspart U-100 pen 0-5 Units         -- SubQ Every 6 hours PRN 09/08/22 0747        Hold Oral hypoglycemics while patient is in the hospital.    PAD (peripheral artery disease)  - continue Eliquis 5 mg BID, ASA 81 mg QD, pravastatin 40 mg QHS    Chronic deep vein thrombosis (DVT) of both lower extremities  - continue Eliquis      Essential hypertension  - hypertensive at present   - home meds: lisinopril 10 mg QD, metoprolol tartrate 50 mg BID  - monitor       COPD (chronic obstructive pulmonary disease)  - PRN duonebs ordered     Iron deficiency anemia  - chronic   - H&H higher than prior   - monitor     Thrombocytosis  - chronic   - stable   - continue ASA 81 Mg QD     Hyperlipidemia  - continue statin     Bipolar 1 disorder  - continue home meds: depakore 500 mg AM & 1250 mg PM, olanzapine 10 mg q8hour PRN, risperidone 4 mg BID,     Tobacco abuse  Assistance with smoking cessation was offered, including:  [x]  Medications  [x]  Counseling  []  Printed Information on Smoking Cessation  []  Referral to a Smoking Cessation Program  Not interested in quitting at this time.   Patient was counseled regarding smoking for 3-10  minutes.          VTE Risk Mitigation (From admission, onward)         Ordered     apixaban tablet 5 mg  2 times daily         09/07/22 2151     IP VTE HIGH RISK PATIENT  Once         09/07/22 2036     Place sequential compression device  Until discontinued         09/07/22 2036                   Arielle Haji PA-C  Department of Hospital Medicine   [facility]

## 2022-09-08 NOTE — ASSESSMENT & PLAN NOTE
- continue Eliquis 5 mg BID, ASA 81 mg QD, pravastatin 40 mg QHS  -US arterial from 05/2022: Sonogram suggest hemodynamically significant stenosis in the left and DPA. Multifocal mild to moderate grade stenoses is suspected throughout the left MIRACLE and, bilateral posterior tibial and peroneal arteries.

## 2022-09-08 NOTE — CONSULTS
"[facility] - [place], [place] - [facility] Surg  Infectious Disease  Consult Note    Patient Name: Audrey Natarajan  MRN: [id number]  Admission Date: 9/7/2022  Hospital Length of Stay: 1 days  Attending Physician: Velvet Galicia DO  Primary Care Provider: Donaldo Pena MD     Isolation Status: No active isolations    Patient information was obtained from patient and ER records.      Consults  Assessment/Plan:     * Cellulitis of left lower extremity  52M with h/o DM with charcot foot, DVT on Eliquis, PAD, tobacco abuse admitted 9/7 with worsening L foot wound. No systemic signs of infection. JEYSON with  hemodynamically significant stenosis in the left and DPA. Multifocal mild to moderate grade stenoses is suspected throughout the left MIRACLE and, bilateral posterior tibial and peroneal arteris. L foot wound swab- group G strep and proteus. MRI -No clear evidence of osteomyelitis with the adjacent midfoot.  No fluid collections. ID consulted for "Per podiatry for further evaluation of long term abx therapy with wound infection" on vanc only    Recommendations:   - stop vancomycin  - start cefepime  - f/u pending cultures/susceptibilties.   - tentatively planning on 2 weeks po abx on d/c, pending culture results  - if podiatry worried for osteomyelitis, would appreciate bone biopsy for path/culture to guide antibiotics  - wound care as per podiatry  - please ensure she has adequate perfusion to heal wound    Discussed with hospitalist             Thank you for your consult. I will follow-up with patient. Please contact us if you have any additional questions.    Beverly Zavala MD  Infectious Disease  [facility] - Med Surg    Subjective:     Principal Problem: Cellulitis of left lower extremity    HPI:   52M with h/o DM with charcot foot, DVT on Eliquis, PAD, tobacco abuse admitted 9/7 with worsening L foot wound. Notably, pt was seen by ID 5/2022 for b/l foot OM/mrsa bacteremia and recommended 6 weeks iv vancomycin, but patient never came for " "follow up. Pt reports L leg wound has worsened over past few days. Denies f/c. Notably patient has refused amputation in the past. Says she completed prior iv abx and didn't come for f/u because she was admitted. Says wound healed with last course of antibiotics and recently got worse      JEYSON with  hemodynamically significant stenosis in the left and DPA. Multifocal mild to moderate grade stenoses is suspected throughout the left MIRACLE and, bilateral posterior tibial and peroneal arteries.      L foot wound swab  Specimen Information: Foot, Left; Wound    0 Result Notes  Component 1 d ago    Aerobic Bacterial Culture  Abnormal   STREPTOCOCCUS GROUP G   Many   Beta-hemolytic streptococci are routinely susceptible to   penicillins,cephalosporins and carbapenems.   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     Plantar soft tissue ulceration with prominent surrounding soft tissue inflammatory changes. No clear evidence of osteomyelitis with the adjacent midfoot.  No fluid collections    ID consulted for "Per podiatry for further evaluation of long term abx therapy with wound infection"           Past Medical History:   Diagnosis Date    ADHD (attention deficit hyperactivity disorder)     Arthritis     Asthma     Bipolar 1 disorder     Cataract     Cigarette smoker     COPD (chronic obstructive pulmonary disease)     Coronary artery disease     A fib    Depression     bipolar manic depresson    Diabetes mellitus     Diabetic foot ulcers     Diabetic neuropathy     DVT of lower extremity, bilateral 07/2013    bilateral LE DVT. [place] filter placed.     Encounter for blood transfusion     History of blood clots 1. Left Leg=2003; 2.Bilateral Groin=Blood Clots= 5 or 6/ 2013 & 7/2013; 3. LLL of Lung=7/2013;  4. Lt. Lower Leg=7/2013.     Pt. " "had 1st Blood Clot after Fszguwelpmab=0595, & Last=2013. [place] Filter= Rt.Lateral Neck.    HTN (hypertension) 06/06/2013    Pt states that she does not have hypertension    Hypercholesteremia     Irregular heartbeat     Neuromuscular disorder     neuropathy feet    Obese     PE (pulmonary embolism) 07/2013    bilat LE DVT.     Restless leg syndrome        Past Surgical History:   Procedure Laterality Date    ABDOMINAL SURGERY  2010    gastric sleeve    BILATERAL OOPHORECTOMY Bilateral 1/12/2015    CHOLECYSTECTOMY      DEBRIDEMENT OF FOOT Bilateral 5/10/2022    Procedure: DEBRIDEMENT, FOOT;  Surgeon: Maira De Los Santos DPM;  Location: [facility] OR;  Service: Podiatry;  Laterality: Bilateral;    Green' s filter Right 7/4/2012    Right Neck & Tunneled Down.    HERNIA REPAIR      "[place] of Hern[place] Repaires around th [place].", pt. states    LAPAROSCOPIC CHOLECYSTECTOMY N/A 9/10/2020    Procedure: CHOLECYSTECTOMY, LAPAROSCOPIC;  Surgeon: Montrell Gutierrez MD;  Location: [facility] OR;  Service: General;  Laterality: N/A;  RN PREOP 9/9----COVID Negative  9/9    OVARIAN CYST REMOVAL  3/13/2014    IN REMOVAL OF OVARY/TUBE(S)      SPLENECTOMY, TOTAL  July 2003    TONSILLECTOMY      as a child    TYMPANOSTOMY TUBE PLACEMENT  1976    VEIN SURGERY  2003    Lt leg       Review of patient's allergies indicates:   Allergen Reactions    Morphine Other (See Comments)     Patient had a psychotic episode after taking Morphine  Agitation, hallucinations    Penicillins Anaphylaxis     itching    Januvia [sitagliptin] Hives    Carbamazepine Other (See Comments)     hyponatremia       No current facility-administered medications on file prior to encounter.     Current Outpatient Medications on File Prior to Encounter   Medication Sig    acetaminophen (TYLENOL) 500 MG tablet Take 2 tablets (1,000 mg total) by mouth every 6 (six) hours as needed for Pain.    albuterol-ipratropium (DUO-NEB) 2.5 mg-0.5 mg/3 mL nebulizer " solution Take 3 mLs by nebulization every 6 (six) hours as needed for Wheezing or Shortness of Breath. Rescue    apixaban (ELIQUIS) 5 mg Tab Take 1 tablet (5 mg total) by mouth 2 (two) times daily.    blood sugar diagnostic Strp To check BG one time daily, to use with insurance preferred meter    blood-glucose meter kit To check BG one time daily, to use with insurance preferred meter    bumetanide (BUMEX) 1 MG tablet Take 1 tablet (1 mg total) by mouth once daily.    divalproex (DEPAKOTE) 250 MG EC tablet Take 5 tablets (1,250 mg total) by mouth every evening.    divalproex (DEPAKOTE) 500 MG TbEC Take 1 tablet (500 mg total) by mouth once daily. PO QAM    doxycycline (VIBRA-TABS) 100 MG tablet Take 1 tablet (100 mg total) by mouth 2 (two) times daily.    fluticasone propionate (FLONASE) 50 mcg/actuation nasal spray 2 sprays (100 mcg total) by Each Nostril route daily as needed (Nasal congestion).    fluticasone-salmeterol diskus inhaler 250-50 mcg Inhale 1 puff into the lungs 2 (two) times daily. Controller    gabapentin (NEURONTIN) 300 MG capsule Take 2 capsules (600 mg total) by mouth 2 (two) times daily.    lancets Misc To check BG one time daily, to use with insurance preferred meter    lisinopriL 10 MG tablet Take 1 tablet (10 mg total) by mouth once daily.    loratadine (CLARITIN) 10 mg tablet Take 1 tablet (10 mg total) by mouth once daily.    metFORMIN (GLUCOPHAGE) 1000 MG tablet Take 1 tablet (1,000 mg total) by mouth 2 (two) times daily with meals.    metoprolol tartrate (LOPRESSOR) 50 MG tablet TAKE 1 TABLET(50 MG) BY MOUTH TWICE DAILY    miconazole nitrate, bulk, Powd Apply to clean dry skin twice daily    multivitamin Tab Take 1 tablet by mouth once daily.    OLANZapine (ZYPREXA) 10 MG tablet Take 1 tablet (10 mg total) by mouth every 8 (eight) hours as needed (Agitation).    pantoprazole (PROTONIX) 40 MG tablet Take 1 tablet (40 mg total) by mouth once daily.    potassium chloride  (MICRO-K) 10 MEQ CpSR Take 1 capsule (10 mEq total) by mouth once daily.    pravastatin (PRAVACHOL) 40 MG tablet Take 1 tablet (40 mg total) by mouth every evening.    risperiDONE (RISPERDAL M-TABS) 3 MG disintegrating tablet Take 1 tablet (3 mg total) by mouth 2 (two) times daily.    ammonium lactate (LAC-HYDRIN) 12 % lotion APPL Y ONCE TOPICALLY TWICE DAILY FOR 30 DAYS    aspirin 81 MG Chew Take 1 tablet (81 mg total) by mouth once daily.    carBAMazepine (TEGRETOL) 200 mg tablet Take 400 mg by mouth 2 (two) times daily.    cetirizine (ZYRTEC) 10 MG tablet Take 1 tablet (10 mg total) by mouth once daily. for 10 days    DUPIXENT  mg/2 mL PnIj Inject into the skin.    hydrOXYzine (ATARAX) 50 MG tablet Take 50 mg by mouth 4 (four) times daily as needed.    [DISCONTINUED] albuterol (PROVENTIL/VENTOLIN HFA) 90 mcg/actuation inhaler Inhale 2 puffs into the lungs every 6 (six) hours as needed for Wheezing. Use with spacer  Dispense with 1 spacer    [DISCONTINUED] diclofenac sodium (VOLTAREN) 1 % Gel Apply 2 g topically 4 (four) times daily as needed (Apply to painful area up to 4 times a day as needed for pain). Apply to painful area 4 times a day as needed for pain    [DISCONTINUED] furosemide (LASIX) 20 MG tablet TAKE 1 TABLET(20 MG) BY MOUTH EVERY DAY    [DISCONTINUED] nystatin (NYSTOP) powder APPLY TOPICALLY TO AXILLA AND BREAST FOLDS TWICE DAILY    [DISCONTINUED] QUEtiapine (SEROQUEL) 200 MG Tab Take 1 tablet (200 mg total) by mouth before breakfast.     Family History       Problem Relation (Age of Onset)    Cataracts Father    Diabetes Father, Paternal Grandfather    Heart disease Father, Paternal Grandfather    Hypertension Father    No Known Problems Mother, Sister, Brother, Maternal Aunt, Maternal Uncle, Paternal Aunt, Paternal Uncle, Maternal Grandfather    Ovarian cancer Maternal Grandmother, Paternal Grandmother          Tobacco Use    Smoking status: Every Day     Packs/day: 1.00      Years: 37.00     Pack years: 37.00     Types: Cigarettes     Last attempt to quit: 2020     Years since quittin.7    Smokeless tobacco: Never    Tobacco comments:     Enrolled in the [place] [employer] on 5/3/14 ([facility] Member ID # [id number]). Ambulatory referral to Smoking Cessation Program   Substance and Sexual Activity    Alcohol use: No     Alcohol/week: 0.0 standard drinks    Drug use: No    Sexual activity: Yes     Partners: Male     Review of Systems   Constitutional:  Negative for appetite change, chills, diaphoresis and fever.   Respiratory:  Negative for cough, shortness of breath and wheezing.    Cardiovascular:  Negative for chest pain and palpitations.   Gastrointestinal:  Negative for abdominal pain, constipation, diarrhea, nausea and vomiting.   Genitourinary:  Negative for dysuria, flank pain, frequency, hematuria and urgency.   Musculoskeletal:  Negative for arthralgias, back pain, myalgias, neck pain and neck stiffness.   Skin:  Positive for color change and wound. Negative for pallor and rash.   Neurological:  Negative for dizziness, weakness, light-headedness and headaches.   Psychiatric/Behavioral:  Negative for agitation and confusion.    Objective:     Vital Signs (Most Recent):  Temp: 98.1 °F (36.7 °C) (22 1129)  Pulse: 64 (22 1129)  Resp: 18 (22 1149)  BP: 115/61 (22 1129)  SpO2: (!) 94 % (22 112) Vital Signs (24h Range):  Temp:  [97.7 °F (36.5 °C)-98.6 °F (37 °C)] 98.1 °F (36.7 °C)  Pulse:  [64-85] 64  Resp:  [16-20] 18  SpO2:  [92 %-99 %] 94 %  BP: (115-176)/(59-74) 115/61     Weight: 105.8 kg (233 lb 4 oz)  Body mass index is 37.65 kg/m².    Physical Exam  Vitals and nursing note reviewed.   Constitutional:       General: She is not in acute distress.     Appearance: She is well-developed. She is obese. She is not ill-appearing, toxic-appearing or diaphoretic.   HENT:      Head: Normocephalic and atraumatic.   Eyes:      General: No scleral icterus.         Right eye: No discharge.         Left eye: No discharge.      Conjunctiva/sclera: Conjunctivae normal.   Neck:      Trachea: No tracheal deviation.   Cardiovascular:      Rate and Rhythm: Normal rate and regular rhythm.      Heart sounds: Normal heart sounds. No murmur heard.    No gallop.   Pulmonary:      Effort: Pulmonary effort is normal. No respiratory distress.      Breath sounds: Normal breath sounds. No stridor. No wheezing or rales.   Abdominal:      General: Bowel sounds are normal. There is no distension.      Palpations: Abdomen is soft. There is no mass.      Tenderness: There is no abdominal tenderness. There is no guarding.   Musculoskeletal:         General: Deformity (Charcot's deformity of left foot) present. Normal range of motion.      Cervical back: Normal range of motion and neck supple.   Skin:     General: Skin is warm and dry.      Coloration: Skin is not pale.      Findings: Erythema (distal LLE extending into foot) present. No rash.   Neurological:      General: No focal deficit present.      Mental Status: She is alert and oriented to person, place, and time.      Cranial Nerves: No cranial nerve deficit.      Motor: No abnormal muscle tone.   Psychiatric:         Mood and Affect: Mood normal.         Behavior: Behavior normal.         Thought Content: Thought content normal.         Judgment: Judgment normal.             Significant Labs: All pertinent labs within the past 24 hours have been reviewed.  BMP:   Recent Labs   Lab 09/08/22  0544      *   K 4.5   CL 92*   CO2 28   BUN 10   CREATININE 0.6   CALCIUM 8.7   MG 1.6       CBC:   Recent Labs   Lab 09/07/22  1531 09/08/22  0546   WBC 15.83* 10.81   HGB 10.0* 9.3*   HCT 30.2* 29.1*   * 673*       CMP:   Recent Labs   Lab 09/07/22  1531 09/08/22  0544   * 128*   K 5.2* 4.5   CL 92* 92*   CO2 28 28    107   BUN 13 10   CREATININE 0.7 0.6   CALCIUM 9.3 8.7   PROT 7.1 6.3   ALBUMIN 3.1* 2.7*    BILITOT 0.1 0.2   ALKPHOS 112 103   AST 9* 9*   ALT 8* 8*   ANIONGAP 9 8       Urine Culture: No results for input(s): LABURIN in the last 48 hours.  Urine Studies: No results for input(s): COLORU, APPEARANCEUA, PHUR, SPECGRAV, PROTEINUA, GLUCUA, KETONESU, BILIRUBINUA, OCCULTUA, NITRITE, UROBILINOGEN, LEUKOCYTESUR, RBCUA, WBCUA, BACTERIA, SQUAMEPITHEL, HYALINECASTS in the last 48 hours.    Invalid input(s): WRIGHTSUR    Significant Imaging: I have reviewed all pertinent imaging results/findings within the past 24 hours.  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    Plantar soft tissue ulceration with prominent surrounding soft tissue inflammatory changes. No clear evidence of osteomyelitis with the adjacent midfoot.  No fluid collections.      Electronically signed by: Israel Mabry MD  Date:    09/08/2022  Time:    11:39               Narrative:    EXAMINATION:  MRI FOOT (MIDFOOT) LEFT W W/O CONTRAST    CLINICAL HISTORY:  Foot swelling, diabetic, osteomyelitis suspected, xray done;  Cellulitis of left lower limb    TECHNIQUE:  Routine left multisequence multiplanar MRI forefoot protocol performed with the administration of 7.5 cc of Gadavist IV contrast.    COMPARISON:  05/05/2022    FINDINGS:  There is advanced destructive neuropathic arthropathy involving the joints of the midfoot.  Scattered associated marrow edema/increased T2 signal noted, likely reactive from the underlying arthropathy.  There is soft tissue ulceration noted along the plantar aspect of the midfoot with prominent surrounding soft tissue inflammatory changes.  No adjacent osteomyelitis identified contiguous with this soft tissue inflammation.  No fluid collections identified.  Muscle atrophy noted.                                        X-Ray Foot Complete Left (Final result)  Result time 09/07/22 17:29:35      Final result by Shalom Bro MD (09/07/22 17:29:35)                   Impression:      See above.      Electronically signed by: Shalom Bro MD  Date:    09/07/2022  Time:    17:29               Narrative:    EXAMINATION:  XR FOOT COMPLETE 3 VIEW LEFT    CLINICAL HISTORY:  .  Type 2 diabetes mellitus with foot ulcer    TECHNIQUE:  AP, lateral and oblique views of the left foot were performed.    COMPARISON:  04/02/2022.    FINDINGS:  Severe advanced degenerative changes are seen throughout the midfoot with chronic fragmentation which may relate to Charcot foot.  There is chronic Lisfranc deformity seen with lateral dislocation of the 2nd metatarsal in relation to the middle cuneiform.  No acute displaced fracture or dislocation seen.  Osteopenic changes are seen.  Prominent soft tissue swelling is seen over the dorsum of the foot.  Ulceration is seen at the plantar aspect of the midfoot.  Overall no significant change in the appearance of the foot from prior radiographs from 04/20/2022.

## 2022-09-08 NOTE — ASSESSMENT & PLAN NOTE
A1c:   Lab Results   Component Value Date    HGBA1C 7.0 (H) 04/13/2022     Meds: SSI PRN to maintain goal 140-180  ADA diet, accuchecks ACHS, hypoglycemic protocol  Hold Oral hypoglycemics while patient is in the hospital.

## 2022-09-08 NOTE — CONSULTS
[facility]  Podiatry  Consult Note    Patient Name: Audrey Natarajan  MRN: [id number]  Admission Date: 9/7/2022  Hospital Length of Stay: 0 days  Attending Physician: Benigno Morelos MD  Primary Care Provider: Donaldo Pena MD     Inpatient consult to Podiatry  Consult performed by: Maira De Los Santos DPM  Consult ordered by: Karla Ordoñez PA-C  Reason for consult: failed outpatient therapy, cellulitis      Subjective:     History of Present Illness: Audrey Natarajan is a 52 y.o. female with  has a past medical history of ADHD (attention deficit hyperactivity disorder), Arthritis, Asthma, Bipolar 1 disorder, Cataract, Cigarette smoker, COPD (chronic obstructive pulmonary disease), Coronary artery disease, Depression, Diabetes mellitus, Diabetic foot ulcers, Diabetic neuropathy, DVT of lower extremity, bilateral, Encounter for blood transfusion, History of blood clots, HTN (hypertension), Hypercholesteremia, Irregular heartbeat, Neuromuscular disorder, Obese, PE (pulmonary embolism), and Restless leg syndrome.  Consulted to Podiatry for evaluation and treatment of left foot ulcer.   Location:  Left plantar midfoot patient is familiar to me and to our service the aforementioned wound is chronic in nature and has been treated for osteomyelitis following hospital admission in May 2022.  Following hospital discharge patient has been being [facility] twice weekly.  Patient relates that for the last 3-5 days she is been having increased pain and swelling to the left lower extremity.  She relates that the pain and swelling was so severe that she cut her dressing in order to try to relieve some of the pressure.  She denies any excess ambulation or change in activity.  She denies any trauma to the area.  She denies getting the foot wet.  She relates the only change that she now has a vehicle but states that her 1st time driving it without her supportive boot was today.  She was  taking antibiotics prescribed on Friday as instructed.  She was asked to present to the emergency department from the [facility] following her weekly nurse visit.     Chief Complaint   Patient presents with    Wound Check     Sent from [facility] for possible infection to left leg wound, also has increasing reddness & warmth spreading from area       Scheduled Meds:   enoxaparin  40 mg Subcutaneous Daily    sodium chloride 0.9%  10 mL Intravenous Q8H    [START ON 9/8/2022] vancomycin (VANCOCIN) IVPB  1,750 mg Intravenous Q12H     Continuous Infusions:  PRN Meds:acetaminophen, melatonin, naloxone, oxyCODONE, senna-docusate 8.6-50 mg, Pharmacy to dose Vancomycin consult **AND** vancomycin - pharmacy to dose    Review of patient's allergies indicates:   Allergen Reactions    Morphine Other (See Comments)     Patient had a psychotic episode after taking Morphine  Agitation, hallucinations    Penicillins Anaphylaxis     itching    Januvia [sitagliptin] Hives    Carbamazepine Other (See Comments)     hyponatremia        Past Medical History:   Diagnosis Date    ADHD (attention deficit hyperactivity disorder)     Arthritis     Asthma     Bipolar 1 disorder     Cataract     Cigarette smoker     COPD (chronic obstructive pulmonary disease)     Coronary artery disease     A fib    Depression     bipolar manic depresson    Diabetes mellitus     Diabetic foot ulcers     Diabetic neuropathy     DVT of lower extremity, bilateral 07/2013    bilateral LE DVT. Estelita filter placed.     Encounter for blood transfusion     History of blood clots 1. Left Leg=2003; 2.Bilateral Groin=Blood Clots= 5 or 6/ 2013 & 7/2013; 3. LLL of Lung=7/2013;  4. Lt. Lower Leg=7/2013.     Pt. had 1st Blood Clot after Wexgnrqzymjl=6616, & Last=2013. Estelita Filter= Rt.Lateral Neck.    HTN (hypertension) 06/06/2013    Pt states that she does not have hypertension    Hypercholesteremia     Irregular heartbeat     Neuromuscular disorder     neuropathy  "feet    Obese     PE (pulmonary embolism) 2013    bilat LE DVT.     Restless leg syndrome      Past Surgical History:   Procedure Laterality Date    ABDOMINAL SURGERY  2010    gastric sleeve    BILATERAL OOPHORECTOMY Bilateral 2015    CHOLECYSTECTOMY      DEBRIDEMENT OF FOOT Bilateral 5/10/2022    Procedure: DEBRIDEMENT, FOOT;  Surgeon: Maiar De Los Santos DPM;  Location: [facility] OR;  Service: Podiatry;  Laterality: Bilateral;    Green' s filter Right 2012    Right Neck & Tunneled Down.    HERNIA REPAIR      "[place] Hernias Repaires around th [place]ly Button.", pt. states    LAPAROSCOPIC CHOLECYSTECTOMY N/A 9/10/2020    Procedure: CHOLECYSTECTOMY, LAPAROSCOPIC;  Surgeon: Montrell Gutierrez MD;  Location: [facility] OR;  Service: General;  Laterality: N/A;  RN PREOP ----COVID Negative      OVARIAN CYST REMOVAL  3/13/2014    MO REMOVAL OF OVARY/TUBE(S)      SPLENECTOMY, TOTAL  2003    TONSILLECTOMY      as a child    TYMPANOSTOMY TUBE PLACEMENT      VEIN SURGERY      Lt leg       Family History       Problem Relation (Age of Onset)    Cataracts Father    Diabetes Father, Paternal Grandfather    Heart disease Father, Paternal Grandfather    Hypertension Father    No Known Problems Mother, Sister, Brother, Maternal Aunt, Maternal Uncle, Paternal Aunt, Paternal Uncle, Maternal Grandfather    Ovarian cancer Maternal Grandmother, Paternal Grandmother          Tobacco Use    Smoking status: Every Day     Packs/day: 1.00     Years: 37.00     Pack years: 37.00     Types: Cigarettes     Last attempt to quit: 2020     Years since quittin.7    Smokeless tobacco: Never    Tobacco comments:     Enrolled in the [employer] on 5/3/14 ([facility] Member ID # [id number]). Ambulatory referral to Smoking Cessation Program   Substance and Sexual Activity    Alcohol use: No     Alcohol/week: 0.0 standard drinks    Drug use: No    Sexual activity: Yes     Partners: Male     Review of Systems   Constitutional:  Positive " for activity change and fatigue. Negative for appetite change, chills and fever.   Respiratory:  Negative for cough and shortness of breath.    Cardiovascular:  Positive for leg swelling. Negative for chest pain.   Gastrointestinal:  Negative for diarrhea, nausea and vomiting.   Musculoskeletal:  Positive for arthralgias and myalgias.   Skin:  Positive for color change and wound.   Neurological:  Positive for numbness. Negative for weakness.        + paresthesia    Objective:     Vital Signs (Most Recent):  Temp: 98.4 °F (36.9 °C) (09/07/22 1528)  Pulse: 85 (09/07/22 1935)  Resp: 19 (09/07/22 2118)  BP: 134/69 (09/07/22 1935)  SpO2: 99 % (09/07/22 1935) Vital Signs (24h Range):  Temp:  [96.7 °F (35.9 °C)-98.4 °F (36.9 °C)] 98.4 °F (36.9 °C)  Pulse:  [83-88] 85  Resp:  [18-20] 19  SpO2:  [98 %-99 %] 99 %  BP: (134-176)/(69-74) 134/69     Weight: 106.6 kg (235 lb)  Body mass index is 37.93 kg/m².       Physical Exam  Nursing note reviewed.   Constitutional:       General: She is not in acute distress.     Appearance: She is not toxic-appearing or diaphoretic.   Cardiovascular:      Pulses:           Dorsalis pedis pulses are 1+ on the right side and 1+ on the left side.        Posterior tibial pulses are 2+ on the right side and 2+ on the left side.   Pulmonary:      Effort: No respiratory distress.   Musculoskeletal:      Right lower leg: Edema present.      Left lower leg: Edema present.      Right ankle: Swelling present. No lateral malleolus, medial malleolus, AITF ligament, CF ligament or posterior TF ligament tenderness. Decreased range of motion.      Right Achilles Tendon: No defects. Paniagua's test negative.      Left ankle: Swelling present. No lateral malleolus, medial malleolus, AITF ligament, CF ligament, posterior TF ligament or proximal fibula tenderness. Decreased range of motion.      Left Achilles Tendon: No defects. Paniagua's test negative.      Right foot: Swelling  present.      Left foot:  Swelling, Charcot foot and tenderness present.      Comments: There is equinus deformity bilateral with decreased dorsiflexion at the ankle joint bilateral     Skin:     General: Skin is warm and dry.      Coloration: Skin is not pale.      Findings: Erythema and wound (see below) present.       Nails: There is no clubbing.   Neurological:      Sensory: No sensory deficit.      Motor: No tremor, atrophy or abnormal muscle tone.      Deep Tendon Reflexes: Reflexes are normal and symmetric.      Comments: Paresthesias, and hyperesthesia bilateral feet at toes with no clearly identified trigger or source.    Mumford-Gilberto 5.07 monofilament is intact bilateral feet. Sharp/dull sensation is also intact Bilateral feet.   Psychiatric:         Attention and Perception: She is attentive.         Mood and Affect: Mood is not anxious. Affect is not inappropriate.         Speech: She is communicative. Speech is not slurred.         Behavior: Behavior is not combative.     09/07/2022  Ulcer location:  Left plantar medial midfoot   Measurements :  3.1 x 2.7 x 0.7  cm   Signs of infection:  Local edema and erythema, pain with palpation   Drainage: Sero-Sanguinous  Purulence: no  Crepitus/fluctuance: no  Periwound: Reddened, Macerated, Calloused  Base: Mixed Granular/Fibrotic  Depth:  Bone  at the proximal lateral border  Probe to bone: yes        Laboratory:  A1C:   Recent Labs   Lab 03/29/22  1032 04/13/22  0028   HGBA1C 7.2* 7.0*     CBC:   Recent Labs   Lab 09/07/22  1531   WBC 15.83*   RBC 4.12   HGB 10.0*   HCT 30.2*   *   MCV 73*   MCH 24.3*   MCHC 33.1     CMP:   Recent Labs   Lab 09/07/22  1531      CALCIUM 9.3   ALBUMIN 3.1*   PROT 7.1   *   K 5.2*   CO2 28   CL 92*   BUN 13   CREATININE 0.7   ALKPHOS 112   ALT 8*   AST 9*   BILITOT 0.1     CRP:   Recent Labs   Lab 09/07/22  1531   CRP 33.3*     ESR:   Recent Labs   Lab 09/07/22  1531   SEDRATE 37*     Microbiology Results (last 7 days)        Procedure Component Value Units Date/Time    Aerobic culture (Specify Source) **CANNOT BE ORDERED AS STAT** [[id number]] Collected: 09/07/22 1707    Order Status: Sent Specimen: Wound from Foot, Left Updated: 09/07/22 1717            Diagnostic Results:  Imaging Results              X-Ray Foot Complete Left (Final result)  Result time 09/07/22 17:29:35      Final result by Shalom Bro MD (09/07/22 17:29:35)                   Impression:      See above.      Electronically signed by: Shalom Bro MD  Date:    09/07/2022  Time:    17:29               Narrative:    EXAMINATION:  XR FOOT COMPLETE 3 VIEW LEFT    CLINICAL HISTORY:  .  Type 2 diabetes mellitus with foot ulcer    TECHNIQUE:  AP, lateral and oblique views of the left foot were performed.    COMPARISON:  04/02/2022.    FINDINGS:  Severe advanced degenerative changes are seen throughout the midfoot with chronic fragmentation which may relate to Charcot foot.  There is chronic Lisfranc deformity seen with lateral dislocation of the 2nd metatarsal in relation to the middle cuneiform.  No acute displaced fracture or dislocation seen.  Osteopenic changes are seen.  Prominent soft tissue swelling is seen over the dorsum of the foot.  Ulceration is seen at the plantar aspect of the midfoot.  Overall no significant change in the appearance of the foot from prior radiographs from 04/20/2022.                                      Assessment/Plan:     There are no hospital problems to display for this patient.    Greater than 50% of this visit spent on counseling and coordination of care. The importance of tight glycemic control, adequate vitamin supplementation, protein intake, and hydration - discussed with patient    All labs and current imaging reviewed.  I am concerned with the increased depth that occurred in such a short period of time.  I am now able to palpate bone to the proximal lateral aspect of her wound which previously did not have a draining sinus  nor tract    Long discussion about potential treatment options if she is found to have chronic refractory osteomyelitis.  MRI ordered.  When below-knee amputation is discussed as a potential option for treatment she is dismissive of this idea and states that she would like to attempt to eradicate her bone infection and in the future pursue Charcot foot reconstruction.    Discussed options for treatment of OM. Options were discussed with possible side effects of each.  Below-knee amputation vs bone biopsy for C&S IV abx for six weeks with aggressive wound care for several months with no guarantee of wound resolution or PO abx (if indicated based on culture) for six weeks with aggressive wound care for several months with no guarantee of wound resolution (discussed regular lab work involved with abx options).  Patient may be a candidate for hyperbaric oxygen as another attempt for limb salvage     Will discuss options further once MRI is resulted, in the meantime patient will be seen by ID for further evaluation and discussion on if he is a candidate for long term abx therapy or if amputation will be more appropriate.      We will continue to follow and work in partnership with treatment team on how to best treat patient concern and diagnosis.    Thank you for your consult. I will follow-up with patient. Please contact us if you have any additional questions.    Maira De Los Santos DPM  Podiatry  [facility] - Emergency Dept

## 2022-09-08 NOTE — PLAN OF CARE
Problem: Adult Inpatient Plan of Care  Goal: Plan of Care Review  Outcome: Ongoing, Progressing  Goal: Patient-Specific Goal (Individualized)  Outcome: Ongoing, Progressing  Goal: Absence of Hospital-Acquired Illness or Injury  Outcome: Ongoing, Progressing  Goal: Optimal Comfort and Wellbeing  Outcome: Ongoing, Progressing  Goal: Readiness for Transition of Care  Outcome: Ongoing, Progressing     Problem: Diabetes Comorbidity  Goal: Blood Glucose Level Within Targeted Range  Outcome: Ongoing, Progressing     Problem: Infection  Goal: Absence of Infection Signs and Symptoms  Outcome: Ongoing, Progressing     Problem: Impaired Wound Healing  Goal: Optimal Wound Healing  Outcome: Ongoing, Progressing

## 2022-09-08 NOTE — ASSESSMENT & PLAN NOTE
- continue home meds: depakore 500 mg AM & 1250 mg PM, olanzapine 10 mg q8hour PRN, risperidone 4 mg BID,

## 2022-09-08 NOTE — ASSESSMENT & PLAN NOTE
- Ms. Audrey Natarajan presents with redness, warmth and swelling of the left foot extending into the distal LLE   - h/o MRSA infection of the left foot as well as osteomyelitis of the left foot   - inflammatory markers are elevated   - x-ray without evidence for osteomyelitis   -MRI left foot with no evidence of osteomyelitis  - s/p vancomycin in ED, continue   -Podiatry consulted  -Continue Vancomycin day 2  - monitor

## 2022-09-08 NOTE — SUBJECTIVE & OBJECTIVE
"Past Medical History:   Diagnosis Date    ADHD (attention deficit hyperactivity disorder)     Arthritis     Asthma     Bipolar 1 disorder     Cataract     Cigarette smoker     COPD (chronic obstructive pulmonary disease)     Coronary artery disease     A fib    Depression     bipolar manic depresson    Diabetes mellitus     Diabetic foot ulcers     Diabetic neuropathy     DVT of lower extremity, bilateral 07/2013    bilateral LE DVT. [place] filter placed.     Encounter for blood transfusion     History of blood clots 1. Left Leg=2003; 2.Bilateral Groin=Blood Clots= 5 or 6/ 2013 & 7/2013; 3. LLL of Lung=7/2013;  4. Lt. Lower Leg=7/2013.     Pt. had 1st Blood Clot after Hmbkrgugbfns=6006, & Last=2013. [place] Filter= Rt.Lateral Neck.    HTN (hypertension) 06/06/2013    Pt states that she does not have hypertension    Hypercholesteremia     Irregular heartbeat     Neuromuscular disorder     neuropathy feet    Obese     PE (pulmonary embolism) 07/2013    bilat LE DVT.     Restless leg syndrome        Past Surgical History:   Procedure Laterality Date    ABDOMINAL SURGERY  2010    gastric sleeve    BILATERAL OOPHORECTOMY Bilateral 1/12/2015    CHOLECYSTECTOMY      DEBRIDEMENT OF FOOT Bilateral 5/10/2022    Procedure: DEBRIDEMENT, FOOT;  Surgeon: Maira De Los Santos DPM;  Location: [facility] OR;  Service: Podiatry;  Laterality: Bilateral;    [place] filter Right 7/4/2012    Right Neck & Tunneled Down.    HERNIA REPAIR      "[place] of Hernias Repaires around th [place].", pt. states    LAPAROSCOPIC CHOLECYSTECTOMY N/A 9/10/2020    Procedure: CHOLECYSTECTOMY, LAPAROSCOPIC;  Surgeon: Montrell Gutierrez MD;  Location: [facility] OR;  Service: General;  Laterality: N/A;  RN PREOP 9/9----COVID Negative  9/9    OVARIAN CYST REMOVAL  3/13/2014    MS REMOVAL OF OVARY/TUBE(S)      SPLENECTOMY, TOTAL  July 2003    TONSILLECTOMY      as a child    TYMPANOSTOMY TUBE PLACEMENT  1976    VEIN SURGERY  2003    Lt leg       Review of patient's " allergies indicates:   Allergen Reactions    Morphine Other (See Comments)     Patient had a psychotic episode after taking Morphine  Agitation, hallucinations    Penicillins Anaphylaxis     itching    Januvia [sitagliptin] Hives    Carbamazepine Other (See Comments)     hyponatremia       No current facility-administered medications on file prior to encounter.     Current Outpatient Medications on File Prior to Encounter   Medication Sig    acetaminophen (TYLENOL) 500 MG tablet Take 2 tablets (1,000 mg total) by mouth every 6 (six) hours as needed for Pain.    albuterol-ipratropium (DUO-NEB) 2.5 mg-0.5 mg/3 mL nebulizer solution Take 3 mLs by nebulization every 6 (six) hours as needed for Wheezing or Shortness of Breath. Rescue    apixaban (ELIQUIS) 5 mg Tab Take 1 tablet (5 mg total) by mouth 2 (two) times daily.    blood sugar diagnostic Strp To check BG one time daily, to use with insurance preferred meter    blood-glucose meter kit To check BG one time daily, to use with insurance preferred meter    bumetanide (BUMEX) 1 MG tablet Take 1 tablet (1 mg total) by mouth once daily.    divalproex (DEPAKOTE) 250 MG EC tablet Take 5 tablets (1,250 mg total) by mouth every evening.    divalproex (DEPAKOTE) 500 MG TbEC Take 1 tablet (500 mg total) by mouth once daily. PO QAM    doxycycline (VIBRA-TABS) 100 MG tablet Take 1 tablet (100 mg total) by mouth 2 (two) times daily.    fluticasone propionate (FLONASE) 50 mcg/actuation nasal spray 2 sprays (100 mcg total) by Each Nostril route daily as needed (Nasal congestion).    fluticasone-salmeterol diskus inhaler 250-50 mcg Inhale 1 puff into the lungs 2 (two) times daily. Controller    gabapentin (NEURONTIN) 300 MG capsule Take 2 capsules (600 mg total) by mouth 2 (two) times daily.    lancets Misc To check BG one time daily, to use with insurance preferred meter    lisinopriL 10 MG tablet Take 1 tablet (10 mg total) by mouth once daily.    loratadine (CLARITIN) 10 mg  tablet Take 1 tablet (10 mg total) by mouth once daily.    metFORMIN (GLUCOPHAGE) 1000 MG tablet Take 1 tablet (1,000 mg total) by mouth 2 (two) times daily with meals.    metoprolol tartrate (LOPRESSOR) 50 MG tablet TAKE 1 TABLET(50 MG) BY MOUTH TWICE DAILY    miconazole nitrate, bulk, Powd Apply to clean dry skin twice daily    multivitamin Tab Take 1 tablet by mouth once daily.    OLANZapine (ZYPREXA) 10 MG tablet Take 1 tablet (10 mg total) by mouth every 8 (eight) hours as needed (Agitation).    pantoprazole (PROTONIX) 40 MG tablet Take 1 tablet (40 mg total) by mouth once daily.    potassium chloride (MICRO-K) 10 MEQ CpSR Take 1 capsule (10 mEq total) by mouth once daily.    pravastatin (PRAVACHOL) 40 MG tablet Take 1 tablet (40 mg total) by mouth every evening.    risperiDONE (RISPERDAL M-TABS) 3 MG disintegrating tablet Take 1 tablet (3 mg total) by mouth 2 (two) times daily.    ammonium lactate (LAC-HYDRIN) 12 % lotion APPL Y ONCE TOPICALLY TWICE DAILY FOR 30 DAYS    aspirin 81 MG Chew Take 1 tablet (81 mg total) by mouth once daily.    carBAMazepine (TEGRETOL) 200 mg tablet Take 400 mg by mouth 2 (two) times daily.    cetirizine (ZYRTEC) 10 MG tablet Take 1 tablet (10 mg total) by mouth once daily. for 10 days    DUPIXENT  mg/2 mL PnIj Inject into the skin.    hydrOXYzine (ATARAX) 50 MG tablet Take 50 mg by mouth 4 (four) times daily as needed.    [DISCONTINUED] albuterol (PROVENTIL/VENTOLIN HFA) 90 mcg/actuation inhaler Inhale 2 puffs into the lungs every 6 (six) hours as needed for Wheezing. Use with spacer  Dispense with 1 spacer    [DISCONTINUED] diclofenac sodium (VOLTAREN) 1 % Gel Apply 2 g topically 4 (four) times daily as needed (Apply to painful area up to 4 times a day as needed for pain). Apply to painful area 4 times a day as needed for pain    [DISCONTINUED] furosemide (LASIX) 20 MG tablet TAKE 1 TABLET(20 MG) BY MOUTH EVERY DAY    [DISCONTINUED] nystatin (NYSTOP) powder APPLY TOPICALLY  TO AXILLA AND BREAST FOLDS TWICE DAILY    [DISCONTINUED] QUEtiapine (SEROQUEL) 200 MG Tab Take 1 tablet (200 mg total) by mouth before breakfast.     Family History       Problem Relation (Age of Onset)    Cataracts Father    Diabetes Father, Paternal Grandfather    Heart disease Father, Paternal Grandfather    Hypertension Father    No Known Problems Mother, Sister, Brother, Maternal Aunt, Maternal Uncle, Paternal Aunt, Paternal Uncle, Maternal Grandfather    Ovarian cancer Maternal Grandmother, Paternal Grandmother          Tobacco Use    Smoking status: Every Day     Packs/day: 1.00     Years: 37.00     Pack years: 37.00     Types: Cigarettes     Last attempt to quit: 2020     Years since quittin.7    Smokeless tobacco: Never    Tobacco comments:     Enrolled in the [employer] on 5/3/14 ([employer] Member ID # [id number]). Ambulatory referral to Smoking Cessation Program   Substance and Sexual Activity    Alcohol use: No     Alcohol/week: 0.0 standard drinks    Drug use: No    Sexual activity: Yes     Partners: Male     Review of Systems   Constitutional:  Negative for appetite change, chills, diaphoresis and fever.   Respiratory:  Negative for cough, shortness of breath and wheezing.    Cardiovascular:  Negative for chest pain and palpitations.   Gastrointestinal:  Negative for abdominal pain, constipation, diarrhea, nausea and vomiting.   Genitourinary:  Negative for dysuria, flank pain, frequency, hematuria and urgency.   Musculoskeletal:  Negative for arthralgias, back pain, myalgias, neck pain and neck stiffness.   Skin:  Positive for color change and wound. Negative for pallor and rash.   Neurological:  Negative for dizziness, weakness, light-headedness and headaches.   Psychiatric/Behavioral:  Negative for agitation and confusion.    Objective:     Vital Signs (Most Recent):  Temp: 98.1 °F (36.7 °C) (22 1129)  Pulse: 64 (22 1129)  Resp: 18 (22 1149)  BP: 115/61 (22  1129)  SpO2: (!) 94 % (09/08/22 1129) Vital Signs (24h Range):  Temp:  [97.7 °F (36.5 °C)-98.6 °F (37 °C)] 98.1 °F (36.7 °C)  Pulse:  [64-85] 64  Resp:  [16-20] 18  SpO2:  [92 %-99 %] 94 %  BP: (115-176)/(59-74) 115/61     Weight: 105.8 kg (233 lb 4 oz)  Body mass index is 37.65 kg/m².    Physical Exam  Vitals and nursing note reviewed.   Constitutional:       General: She is not in acute distress.     Appearance: She is well-developed. She is obese. She is not ill-appearing, toxic-appearing or diaphoretic.   HENT:      Head: Normocephalic and atraumatic.   Eyes:      General: No scleral icterus.        Right eye: No discharge.         Left eye: No discharge.      Conjunctiva/sclera: Conjunctivae normal.   Neck:      Trachea: No tracheal deviation.   Cardiovascular:      Rate and Rhythm: Normal rate and regular rhythm.      Heart sounds: Normal heart sounds. No murmur heard.    No gallop.   Pulmonary:      Effort: Pulmonary effort is normal. No respiratory distress.      Breath sounds: Normal breath sounds. No stridor. No wheezing or rales.   Abdominal:      General: Bowel sounds are normal. There is no distension.      Palpations: Abdomen is soft. There is no mass.      Tenderness: There is no abdominal tenderness. There is no guarding.   Musculoskeletal:         General: Deformity (Charcot's deformity of left foot) present. Normal range of motion.      Cervical back: Normal range of motion and neck supple.   Skin:     General: Skin is warm and dry.      Coloration: Skin is not pale.      Findings: Erythema (distal LLE extending into foot) present. No rash.   Neurological:      General: No focal deficit present.      Mental Status: She is alert and oriented to person, place, and time.      Cranial Nerves: No cranial nerve deficit.      Motor: No abnormal muscle tone.   Psychiatric:         Mood and Affect: Mood normal.         Behavior: Behavior normal.         Thought Content: Thought content normal.          Judgment: Judgment normal.             Significant Labs: All pertinent labs within the past 24 hours have been reviewed.  BMP:   Recent Labs   Lab 09/08/22  0544      *   K 4.5   CL 92*   CO2 28   BUN 10   CREATININE 0.6   CALCIUM 8.7   MG 1.6       CBC:   Recent Labs   Lab 09/07/22  1531 09/08/22  0546   WBC 15.83* 10.81   HGB 10.0* 9.3*   HCT 30.2* 29.1*   * 673*       CMP:   Recent Labs   Lab 09/07/22  1531 09/08/22  0544   * 128*   K 5.2* 4.5   CL 92* 92*   CO2 28 28    107   BUN 13 10   CREATININE 0.7 0.6   CALCIUM 9.3 8.7   PROT 7.1 6.3   ALBUMIN 3.1* 2.7*   BILITOT 0.1 0.2   ALKPHOS 112 103   AST 9* 9*   ALT 8* 8*   ANIONGAP 9 8       Urine Culture: No results for input(s): LABURIN in the last 48 hours.  Urine Studies: No results for input(s): COLORU, APPEARANCEUA, PHUR, SPECGRAV, PROTEINUA, GLUCUA, KETONESU, BILIRUBINUA, OCCULTUA, NITRITE, UROBILINOGEN, LEUKOCYTESUR, RBCUA, WBCUA, BACTERIA, SQUAMEPITHEL, HYALINECASTS in the last 48 hours.    Invalid input(s): WRIGHTSUR    Significant Imaging: I have reviewed all pertinent imaging results/findings within the past 24 hours.  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    Plantar soft tissue ulceration with prominent surrounding soft tissue inflammatory changes. No clear evidence of osteomyelitis with the adjacent midfoot.  No fluid collections.      Electronically signed by: Israel Mabry MD  Date:    09/08/2022  Time:    11:39               Narrative:    EXAMINATION:  MRI FOOT (MIDFOOT) LEFT W W/O CONTRAST    CLINICAL HISTORY:  Foot swelling, diabetic, osteomyelitis suspected, xray done;  Cellulitis of left lower limb    TECHNIQUE:  Routine left multisequence multiplanar MRI forefoot protocol performed  with the administration of 7.5 cc of Gadavist IV contrast.    COMPARISON:  05/05/2022    FINDINGS:  There is advanced destructive neuropathic arthropathy involving the joints of the midfoot.  Scattered associated marrow edema/increased T2 signal noted, likely reactive from the underlying arthropathy.  There is soft tissue ulceration noted along the plantar aspect of the midfoot with prominent surrounding soft tissue inflammatory changes.  No adjacent osteomyelitis identified contiguous with this soft tissue inflammation.  No fluid collections identified.  Muscle atrophy noted.                                       X-Ray Foot Complete Left (Final result)  Result time 09/07/22 17:29:35      Final result by Shalom Bro MD (09/07/22 17:29:35)                   Impression:      See above.      Electronically signed by: Shalom Bro MD  Date:    09/07/2022  Time:    17:29               Narrative:    EXAMINATION:  XR FOOT COMPLETE 3 VIEW LEFT    CLINICAL HISTORY:  .  Type 2 diabetes mellitus with foot ulcer    TECHNIQUE:  AP, lateral and oblique views of the left foot were performed.    COMPARISON:  04/02/2022.    FINDINGS:  Severe advanced degenerative changes are seen throughout the midfoot with chronic fragmentation which may relate to Charcot foot.  There is chronic Lisfranc deformity seen with lateral dislocation of the 2nd metatarsal in relation to the middle cuneiform.  No acute displaced fracture or dislocation seen.  Osteopenic changes are seen.  Prominent soft tissue swelling is seen over the dorsum of the foot.  Ulceration is seen at the plantar aspect of the midfoot.  Overall no significant change in the appearance of the foot from prior radiographs from 04/20/2022.

## 2022-09-08 NOTE — PROGRESS NOTES
[facility] Medicine  Progress Note    Patient Name: Audrey Natarajan  MRN: [id number]  Patient Class: IP- Inpatient   Admission Date: 9/7/2022  Length of Stay: 1 days  Attending Physician: Velvet Galicia DO  Primary Care Provider: Donaldo Pena MD        Subjective:     Principal Problem:Cellulitis of left lower extremity        HPI:  Ms. Audrey Natarajan is a 52 y.o. female, with PMH of T2DM, Charcot's foot, osteomyelitis of the foot, diabetic foot ulcer, b/l LE DVTs (on Eliquis), MRSA, PAD, h/o PE, COPD, HTN, Bipolar I, tobacco abuse, who presented to [facility] ED on 9/7/22 for a wound check of her left leg where she had a wound with worsening redness and warmth x 3 days. She notes associated pain. She follows with Dr. De Los Santos (Podiatry) every Friday, and Wound Care every Wednesday. She was evaluated in the ED with labs showing leukocytosis of 15k with left shift, anemia with H&H of 10.0/30.2. A metabolic panel showed sodium of 129, with potassium of 5.2. Inflammatory markers were elevated with ESR of 37, and CRP of 33.3. An x-ray of the foot showed degenerative changes in the midfoot of chronic issues related to Charcot foot as well as other chronic findings and soft tissue swelling over the dorsum of the foot, and ulceration of the plantar surface of the midfoot. All of these findings were without significant change from prior imaging on 4/20/22. She was treated in the ED with vancomycin. She was admitted to inpatient status.       Overview/Hospital Course:  52 y.o. female, with PMH of T2DM, Charcot's foot, osteomyelitis of the foot, diabetic foot ulcer, b/l LE DVTs (on Eliquis), MRSA, PAD, h/o PE, COPD, HTN, Bipolar I, tobacco abuse admitted on 09/07/2022 for LLE cellulitis with persistant wound. Presented from wound clinic for further evaluation of wound with worsening redness and warmth x 3 days. Patient with leukocytosis and elevated inflammatory markers on admission. XR left foot with  severe advanced degenerative changes are seen throughout the midfoot with chronic fragmentation which may relate to Charcot foot. MRI right foot with plantar soft tissue ulceration with prominent surrounding soft tissue inflammatory changes. No clear evidence of osteomyelitis with the adjacent midfoot.  No fluid collections.  Patient started on vancomycin in the ED. Wound cx with group B Streptococcus and presumptive Proteus species. ID and Podiatry consulted      Interval History:  No acute overnight events.  Patient remained afebrile.  Leukocytosis resolved.  Patient continues to have pain in left foot, described it as a sharp and pressure pain.  Requests stronger pain medications.  States she is very hungry would like to eat soon.  Awaiting for Podiatry evaluation.    Review of Systems   Constitutional:  Negative for appetite change, chills, diaphoresis and fever.   Eyes:  Negative for visual disturbance.   Respiratory:  Negative for cough and shortness of breath.    Cardiovascular:  Negative for chest pain, palpitations and leg swelling.   Gastrointestinal:  Negative for abdominal pain, constipation, diarrhea, nausea and vomiting.   Genitourinary:  Negative for dysuria, flank pain, frequency, hematuria and urgency.   Musculoskeletal:  Positive for arthralgias and gait problem. Negative for back pain and myalgias.   Skin:  Positive for color change and wound. Negative for pallor and rash.   Neurological:  Negative for dizziness, syncope, weakness, light-headedness and headaches.   Psychiatric/Behavioral:  Negative for agitation and confusion.      Objective:     Vital Signs (Most Recent):  Temp: 98.1 °F (36.7 °C) (09/08/22 1129)  Pulse: 64 (09/08/22 1129)  Resp: 18 (09/08/22 1149)  BP: 115/61 (09/08/22 1129)  SpO2: (!) 94 % (09/08/22 1129)   Vital Signs (24h Range):  Temp:  [96.7 °F (35.9 °C)-98.6 °F (37 °C)] 98.1 °F (36.7 °C)  Pulse:  [64-88] 64  Resp:  [16-20] 18  SpO2:  [92 %-99 %] 94 %  BP: (115-176)/(59-74)  115/61     Weight: 105.8 kg (233 lb 4 oz)  Body mass index is 37.65 kg/m².    Intake/Output Summary (Last 24 hours) at 9/8/2022 1156  Last data filed at 9/8/2022 0830  Gross per 24 hour   Intake 1360.42 ml   Output --   Net 1360.42 ml      Physical Exam  Vitals and nursing note reviewed.   Constitutional:       General: She is not in acute distress.     Appearance: She is well-developed. She is obese. She is not ill-appearing, toxic-appearing or diaphoretic.   HENT:      Head: Normocephalic and atraumatic.      Right Ear: External ear normal.      Left Ear: External ear normal.      Nose: Nose normal.      Mouth/Throat:      Mouth: Mucous membranes are dry.   Eyes:      General: No scleral icterus.     Extraocular Movements: Extraocular movements intact.      Conjunctiva/sclera: Conjunctivae normal.   Neck:      Trachea: No tracheal deviation.   Cardiovascular:      Rate and Rhythm: Normal rate and regular rhythm.      Pulses: Normal pulses.      Heart sounds: Normal heart sounds.   Pulmonary:      Effort: Pulmonary effort is normal. No respiratory distress.      Breath sounds: Normal breath sounds. No wheezing or rales.   Abdominal:      General: Bowel sounds are normal. There is no distension.      Palpations: Abdomen is soft.      Tenderness: There is no abdominal tenderness. There is no guarding.   Musculoskeletal:         General: Swelling, tenderness and deformity (Charcot's deformity of left foot) present. Normal range of motion.      Cervical back: Normal range of motion and neck supple.      Right lower leg: No edema.      Left lower leg: No edema.      Comments: Right foot with mild swelling. Left foot wrapped in bandage. Left foot with swelling, Charcot foot and tenderness present.    Skin:     General: Skin is warm and dry.      Findings: Erythema (distal LLE extending into foot) present.      Comments: Refer to image for wound   Neurological:      General: No focal deficit present.      Mental Status:  She is alert and oriented to person, place, and time.      Cranial Nerves: No cranial nerve deficit.      Motor: No abnormal muscle tone.   Psychiatric:         Mood and Affect: Mood normal.         Behavior: Behavior normal.         Thought Content: Thought content normal.         Judgment: Judgment normal.         Significant Labs: All pertinent labs within the past 24 hours have been reviewed.    Significant Imaging: I have reviewed all pertinent imaging results/findings within the past 24 hours.      Assessment/Plan:      * Cellulitis of left lower extremity  - Ms. Audrey Natarajan presents with redness, warmth and swelling of the left foot extending into the distal LLE   - h/o MRSA infection of the left foot as well as osteomyelitis of the left foot   - inflammatory markers are elevated   - x-ray without evidence for osteomyelitis   -MRI left foot with no evidence of osteomyelitis  - s/p vancomycin in ED, continue   -Podiatry consulted  -Continue Vancomycin day 2  - monitor         Open wound of left foot  -Wound on left medial midfoot with local edema and erythema   -imaging without osteomyelitis  -Podiatry consulted  -continue antibiotic      Charcot's joint of left foot  -Hx of charcot foot   -MRI left foot: 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   -Podiatry consulted    Type 2 diabetes mellitus  A1c:   Lab Results   Component Value Date    HGBA1C 7.0 (H) 04/13/2022     Meds: SSI PRN to maintain goal 140-180  ADA diet, accuchecks ACHS, hypoglycemic protocol  Hold Oral hypoglycemics while patient is in the hospital.    Essential hypertension  -Continue home meds  - home meds: lisinopril 10 mg QD, metoprolol tartrate 50 mg BID  - monitor       Hyperlipidemia  - continue statin     PAD (peripheral artery disease)  - continue Eliquis 5 mg  BID, ASA 81 mg QD, pravastatin 40 mg QHS  -US arterial from 05/2022: Sonogram suggest hemodynamically significant stenosis in the left and DPA. Multifocal mild to moderate grade stenoses is suspected throughout the left MIRACLE and, bilateral posterior tibial and peroneal arteries.    Chronic deep vein thrombosis (DVT) of both lower extremities  - continue Eliquis      COPD (chronic obstructive pulmonary disease)  - PRN duonebs     Iron deficiency anemia  - chronic   - H&H higher than prior   - H/H stable, no obvious evidence of bleeding   - monitor     Thrombocytosis  - chronic   - stable, appears near baseline   - continue ASA 81 Mg QD     Bipolar 1 disorder  - continue home meds: depakote 500 mg AM & 1250 mg PM, olanzapine 10 mg q8hour PRN, risperidone 4 mg BID,     Tobacco abuse  - Patient was counseled regarding smoking for 3-10 minutes.  - Encourage smoking cessation daily  - Not interested in quitting at this time.             VTE Risk Mitigation (From admission, onward)         Ordered     apixaban tablet 5 mg  2 times daily         09/07/22 2151     IP VTE HIGH RISK PATIENT  Once         09/07/22 2036     Place sequential compression device  Until discontinued         09/07/22 2036                Discharge Planning   HUMBERTO:      Code Status: Full Code   Is the patient medically ready for discharge?:     Reason for patient still in hospital (select all that apply): Laboratory test, Treatment and Consult recommendations  Discharge Plan A: Home                  Velvet Galicia DO  Department of Hospital Medicine   [facility] - Med Surg

## 2022-09-08 NOTE — CONSULTS
[facility] Surg  Infectious Disease  Consult Note    Patient Name: Audrey Natarajan  MRN: [id number]  Admission Date: 9/7/2022  Hospital Length of Stay: 1 days  Attending Physician: Velvet Galicia DO  Primary Care Provider: Donaldo Pena MD     Isolation Status: No active isolations        Inpatient consult to Infectious Diseases  Consult performed by: Beverly Zavala MD  Consult ordered by: Velvet Galicia DO      see consult from 9/8

## 2022-09-08 NOTE — PLAN OF CARE
Problem: Adult Inpatient Plan of Care  Goal: Plan of Care Review  Outcome: Ongoing, Progressing  Goal: Optimal Comfort and Wellbeing  Outcome: Ongoing, Progressing     Problem: Infection  Goal: Absence of Infection Signs and Symptoms  Outcome: Ongoing, Progressing     Problem: Impaired Wound Healing  Goal: Optimal Wound Healing  Outcome: Ongoing, Progressing

## 2022-09-08 NOTE — PROGRESS NOTES
[facility]  Podiatry  Progress Note    Patient Name: Audrey Natarajan  MRN: [id number]  Admission Date: 9/7/2022  Hospital Length of Stay: 1 days  Attending Physician: Velvet Galicia DO  Primary Care Provider: Donaldo Pena MD     Subjective:         History of Present Illness: Audrey Natarajan is a 52 y.o. female with  has a past medical history of ADHD (attention deficit hyperactivity disorder), Arthritis, Asthma, Bipolar 1 disorder, Cataract, Cigarette smoker, COPD (chronic obstructive pulmonary disease), Coronary artery disease, Depression, Diabetes mellitus, Diabetic foot ulcers, Diabetic neuropathy, DVT of lower extremity, bilateral, Encounter for blood transfusion, History of blood clots, HTN (hypertension), Hypercholesteremia, Irregular heartbeat, Neuromuscular disorder, Obese, PE (pulmonary embolism), and Restless leg syndrome.  Consulted to Podiatry for evaluation and treatment of left foot ulcer.   Location:  Left plantar midfoot patient is familiar to me and to our service the aforementioned wound is chronic in nature and has been treated for osteomyelitis following hospital admission in May 2022.  Following hospital discharge patient has been being seen in [facility] twice weekly.  Patient relates that for the last 3-5 days she is been having increased pain and swelling to the left lower extremity.  She relates that the pain and swelling was so severe that she cut her dressing in order to try to relieve some of the pressure.  She denies any excess ambulation or change in activity.  She denies any trauma to the area.  She denies getting the foot wet.  She relates the only change that she now has a vehicle but states that her 1st time driving it without her supportive boot was today.  She was taking antibiotics prescribed on Friday as instructed.  She was asked to present to the emergency department from the [facility] following her weekly nurse visit.     Interval  History: 09/08/2022 patient seen at bedside resting.  She relates improvement with pain, swelling, redness.  No new pedal complaints.        Scheduled Meds:   apixaban  5 mg Oral BID    aspirin  81 mg Oral Daily    bumetanide  1 mg Oral Daily    divalproex  1,250 mg Oral QHS    divalproex  500 mg Oral Daily    fluticasone furoate-vilanteroL  1 puff Inhalation Daily    gabapentin  600 mg Oral BID    lisinopriL  10 mg Oral Daily    metoprolol tartrate  50 mg Oral BID    mupirocin   Nasal BID    pravastatin  40 mg Oral QHS    risperiDONE  3 mg Oral BID    sodium chloride 0.9%  10 mL Intravenous Q8H    vancomycin (VANCOCIN) IVPB  1,750 mg Intravenous Q12H     Continuous Infusions:   sodium chloride 0.9% 100 mL/hr at 09/08/22 0845     PRN Meds:acetaminophen, albuterol-ipratropium, dextrose 10%, dextrose 10%, glucagon (human recombinant), HYDROcodone-acetaminophen, HYDROcodone-acetaminophen, hydrOXYzine, insulin aspart U-100, melatonin, naloxone, OLANZapine, oxyCODONE, senna-docusate 8.6-50 mg, Pharmacy to dose Vancomycin consult **AND** vancomycin - pharmacy to dose    Review of Systems   Constitutional:  Positive for activity change and fatigue. Negative for appetite change, chills and fever.   Respiratory:  Negative for cough and shortness of breath.    Cardiovascular:  Positive for leg swelling. Negative for chest pain.   Gastrointestinal:  Negative for diarrhea, nausea and vomiting.   Musculoskeletal:  Positive for arthralgias and myalgias.   Skin:  Positive for color change and wound.   Neurological:  Positive for numbness. Negative for weakness.        + paresthesia    Objective:     Vital Signs (Most Recent):  Temp: 98.1 °F (36.7 °C) (09/08/22 1641)  Pulse: 83 (09/08/22 1641)  Resp: 20 (09/08/22 1641)  BP: (!) 120/58 (09/08/22 1641)  SpO2: (!) 92 % (09/08/22 1641)   Vital Signs (24h Range):  Temp:  [97.7 °F (36.5 °C)-98.6 °F (37 °C)] 98.1 °F (36.7 °C)  Pulse:  [64-85] 83  Resp:  [16-20] 20  SpO2:  [92 %-99 %] 92  %  BP: (115-148)/(58-71) 120/58     Weight: 105.8 kg (233 lb 4 oz)  Body mass index is 37.65 kg/m².    Physical Exam  Nursing note reviewed.   Constitutional:       General: She is not in acute distress.     Appearance: She is not toxic-appearing or diaphoretic.   Cardiovascular:      Pulses:           Dorsalis pedis pulses are 1+ on the right side and 1+ on the left side.        Posterior tibial pulses are 2+ on the right side and 2+ on the left side.   Pulmonary:      Effort: No respiratory distress.   Musculoskeletal:      Right lower leg: Edema present.      Left lower leg: Edema present.      Right ankle: Swelling present. No lateral malleolus, medial malleolus, AITF ligament, CF ligament or posterior TF ligament tenderness. Decreased range of motion.      Right Achilles Tendon: No defects. Paniagua's test negative.      Left ankle: Swelling present. No lateral malleolus, medial malleolus, AITF ligament, CF ligament, posterior TF ligament or proximal fibula tenderness. Decreased range of motion.      Left Achilles Tendon: No defects. Paniagua's test negative.      Right foot: Swelling  present.      Left foot: Swelling, Charcot foot and tenderness present.      Comments: There is equinus deformity bilateral with decreased dorsiflexion at the ankle joint bilateral     Skin:     General: Skin is warm and dry.      Coloration: Skin is not pale.      Findings: Erythema and wound (see below) present.       Nails: There is no clubbing.   Neurological:      Sensory: No sensory deficit.      Motor: No tremor, atrophy or abnormal muscle tone.      Deep Tendon Reflexes: Reflexes are normal and symmetric.      Comments: Paresthesias, and hyperesthesia bilateral feet at toes with no clearly identified trigger or source.    [place] 5.07 monofilament is intact bilateral feet. Sharp/dull sensation is also intact Bilateral feet.   Psychiatric:         Attention and Perception: She is attentive.         Mood and  Affect: Mood is not anxious. Affect is not inappropriate.         Speech: She is communicative. Speech is not slurred.         Behavior: Behavior is not combative.       09/08/2022  Ulcer location:  Left plantar medial midfoot   Measurements :  3.1 x 2.7 x 0.7  cm   Signs of infection:  Local edema and erythema   Drainage: Sero-Sanguinous  Purulence: no  Crepitus/fluctuance: no  Periwound: Reddened, Macerated, Calloused  Base: Mixed Granular/Fibrotic  Depth: Bone at the proximal lateral border  Probe to bone: yes          09/07/2022  Ulcer location:  Left plantar medial midfoot   Measurements :  3.1 x 2.7 x 0.7  cm   Signs of infection:  Local edema and erythema, pain with palpation   Drainage: Sero-Sanguinous  Purulence: no  Crepitus/fluctuance: no  Periwound: Reddened, Macerated, Calloused  Base: Mixed Granular/Fibrotic  Depth: Bone at the proximal lateral border  Probe to bone: yes        Laboratory:  CBC:   Recent Labs   Lab 09/08/22  0546   WBC 10.81   RBC 4.00   HGB 9.3*   HCT 29.1*   *   MCV 73*   MCH 23.3*   MCHC 32.0     CMP:   Recent Labs   Lab 09/08/22  0544      CALCIUM 8.7   ALBUMIN 2.7*   PROT 6.3   *   K 4.5   CO2 28   CL 92*   BUN 10   CREATININE 0.6   ALKPHOS 103   ALT 8*   AST 9*   BILITOT 0.2     CRP:   Recent Labs   Lab 09/07/22  1531   CRP 33.3*     ESR:   Recent Labs   Lab 09/07/22  1531   SEDRATE 37*     Microbiology Results (last 7 days)       Procedure Component Value Units Date/Time    Aerobic culture (Specify Source) **CANNOT BE ORDERED AS STAT** [[id number]]  (Abnormal) Collected: 09/07/22 1707    Order Status: Completed Specimen: Wound from Foot, Left Updated: 09/08/22 1059     Aerobic Bacterial Culture STREPTOCOCCUS GROUP G  Many  Beta-hemolytic streptococci are routinely susceptible to   penicillins,cephalosporins and carbapenems.        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    Plantar soft tissue ulceration with prominent surrounding soft tissue inflammatory changes. No clear evidence of osteomyelitis with the adjacent midfoot.  No fluid collections.      Electronically signed by: Israel Mabry MD  Date:    09/08/2022  Time:    11:39               Narrative:    EXAMINATION:  MRI FOOT (MIDFOOT) LEFT W W/O CONTRAST    CLINICAL HISTORY:  Foot swelling, diabetic, osteomyelitis suspected, xray done;  Cellulitis of left lower limb    TECHNIQUE:  Routine left multisequence multiplanar MRI forefoot protocol performed with the administration of 7.5 cc of Gadavist IV contrast.    COMPARISON:  05/05/2022    FINDINGS:  There is advanced destructive neuropathic arthropathy involving the joints of the midfoot.  Scattered associated marrow edema/increased T2 signal noted, likely reactive from the underlying arthropathy.  There is soft tissue ulceration noted along the plantar aspect of the midfoot with prominent surrounding soft tissue inflammatory changes.  No adjacent osteomyelitis identified contiguous with this soft tissue inflammation.  No fluid collections identified.  Muscle atrophy noted.                                       X-Ray Foot Complete Left (Final result)  Result time 09/07/22 17:29:35      Final result by Shalom Bro MD (09/07/22 17:29:35)                   Impression:      See above.      Electronically signed by: Shalom Bro MD  Date:    09/07/2022  Time:    17:29               Narrative:    EXAMINATION:  XR FOOT COMPLETE 3 VIEW LEFT    CLINICAL HISTORY:  .  Type 2 diabetes mellitus with foot ulcer    TECHNIQUE:  AP, lateral and oblique views of the left foot were performed.    COMPARISON:  04/02/2022.    FINDINGS:  Severe  advanced degenerative changes are seen throughout the midfoot with chronic fragmentation which may relate to Charcot foot.  There is chronic Lisfranc deformity seen with lateral dislocation of the 2nd metatarsal in relation to the middle cuneiform.  No acute displaced fracture or dislocation seen.  Osteopenic changes are seen.  Prominent soft tissue swelling is seen over the dorsum of the foot.  Ulceration is seen at the plantar aspect of the midfoot.  Overall no significant change in the appearance of the foot from prior radiographs from 04/20/2022.                                      Assessment/Plan:     Active Diagnoses:    Diagnosis Date Noted POA    PRINCIPAL PROBLEM:  Cellulitis of left lower extremity [L03.116] 07/16/2016 Yes    Open wound of left foot [S91.302A] 09/08/2022 Yes    Charcot's joint of left foot [M14.672]  Yes    Iron deficiency anemia [D50.9] 05/06/2022 Yes    PAD (peripheral artery disease) [I73.9] 05/06/2022 Yes    Chronic deep vein thrombosis (DVT) of both lower extremities [I82.503] 04/13/2022 Yes    Type 2 diabetes mellitus [E11.9] 09/26/2016 Yes    Thrombocytosis [D75.839] 07/16/2016 Yes    Bipolar 1 disorder [F31.9] 04/13/2015 Yes     Chronic    Tobacco abuse [Z72.0] 03/06/2014 Yes     Chronic    Hyperlipidemia [E78.5] 02/13/2014 Yes    COPD (chronic obstructive pulmonary disease) [J44.9] 10/11/2013 Yes     Chronic    Essential hypertension [I10] 06/06/2013 Yes     Chronic      Problems Resolved During this Admission:         Greater than 50% of this visit spent on counseling and coordination of care. The importance of tight glycemic control, adequate vitamin supplementation, protein intake, and hydration - discussed with patient    All labs and current imaging reviewed.  I am concerned with the increased depth that occurred in such a short period of time.  I am now able to palpate bone to the proximal lateral aspect of her wound which previously did not have a draining sinus nor  tract    No evidence of osteomyelitis seen on MRI.    I still do not like the changes that she is been having to the wound bed and therefore on the outpatient side we will make some changes to the way in which we are treating this wound.  [place] the [place] we have [place] been able to get home health for wound care and the way in which wound is acting requires more frequent dressing changes than we are able to accommodate currently in the [facility] Care [facility].  Plan for culture based antibiotic powder to be ordered and sent the patient home as well as supplies to be sent to the patient home so that she can do dressing changes between wound clinic visits.    Discharge antibiotic plan per ID    We will continue to follow and work in partnership with treatment team on how to best treat patient concern and diagnosis.    Thank you for your consult. I will follow-up with patient. Please contact us if you have any additional questions.      Maira De Los Santos DPM  Podiatry  [facility] [place] [facility]

## 2022-09-08 NOTE — ASSESSMENT & PLAN NOTE
-Wound on left medial midfoot with local edema and erythema   -imaging without osteomyelitis  -Podiatry consulted  -continue antibiotic

## 2022-09-08 NOTE — SUBJECTIVE & OBJECTIVE
"Past Medical History:   Diagnosis Date    ADHD (attention deficit hyperactivity disorder)     Arthritis     Asthma     Bipolar 1 disorder     Cataract     Cigarette smoker     COPD (chronic obstructive pulmonary disease)     Coronary artery disease     A fib    Depression     bipolar manic depresson    Diabetes mellitus     Diabetic foot ulcers     Diabetic neuropathy     DVT of lower extremity, bilateral 07/2013    bilateral LE DVT. Denison filter placed.     Encounter for blood transfusion     History of blood clots 1. Left Leg=2003; 2.Bilateral Groin=Blood Clots= 5 or 6/ 2013 & 7/2013; 3. LLL of Lung=7/2013;  4. Lt. Lower Leg=7/2013.     Pt. had 1st Blood Clot after Mirtczotylgs=6542, & Last=2013. Denison Filter= Rt.Lateral Neck.    HTN (hypertension) 06/06/2013    Pt states that she does not have hypertension    Hypercholesteremia     Irregular heartbeat     Neuromuscular disorder     neuropathy feet    Obese     PE (pulmonary embolism) 07/2013    bilat LE DVT.     Restless leg syndrome        Past Surgical History:   Procedure Laterality Date    ABDOMINAL SURGERY  2010    gastric sleeve    BILATERAL OOPHORECTOMY Bilateral 1/12/2015    CHOLECYSTECTOMY      DEBRIDEMENT OF FOOT Bilateral 5/10/2022    Procedure: DEBRIDEMENT, FOOT;  Surgeon: Maira De Los Santos DPM;  Location: [facility] OR;  Service: Podiatry;  Laterality: Bilateral;    Green' s filter Right 7/4/2012    Right Neck & Tunneled Down.    HERNIA REPAIR      "Elbert of Hernias Repaires around th [place].", pt. states    LAPAROSCOPIC CHOLECYSTECTOMY N/A 9/10/2020    Procedure: CHOLECYSTECTOMY, LAPAROSCOPIC;  Surgeon: Montrell Gutierrez MD;  Location: [facility] OR;  Service: General;  Laterality: N/A;  RN PREOP 9/9----COVID Negative  9/9    OVARIAN CYST REMOVAL  3/13/2014    [place] REMOVAL OF OVARY/TUBE(S)      SPLENECTOMY, TOTAL  July 2003    TONSILLECTOMY      as a child    TYMPANOSTOMY TUBE PLACEMENT  1976    VEIN SURGERY  2003 "    Lt leg       Review of patient's allergies indicates:   Allergen Reactions    Morphine Other (See Comments)     Patient had a psychotic episode after taking Morphine  Agitation, hallucinations    Penicillins Anaphylaxis     itching    Januvia [sitagliptin] Hives    Carbamazepine Other (See Comments)     hyponatremia       No current facility-administered medications on file prior to encounter.     Current Outpatient Medications on File Prior to Encounter   Medication Sig    acetaminophen (TYLENOL) 500 MG tablet Take 2 tablets (1,000 mg total) by mouth every 6 (six) hours as needed for Pain.    albuterol-ipratropium (DUO-NEB) 2.5 mg-0.5 mg/3 mL nebulizer solution Take 3 mLs by nebulization every 6 (six) hours as needed for Wheezing or Shortness of Breath. Rescue    apixaban (ELIQUIS) 5 mg Tab Take 1 tablet (5 mg total) by mouth 2 (two) times daily.    blood sugar diagnostic Strp To check BG one time daily, to use with insurance preferred meter    blood-glucose meter kit To check BG one time daily, to use with insurance preferred meter    bumetanide (BUMEX) 1 MG tablet Take 1 tablet (1 mg total) by mouth once daily.    divalproex (DEPAKOTE) 250 MG EC tablet Take 5 tablets (1,250 mg total) by mouth every evening.    divalproex (DEPAKOTE) 500 MG TbEC Take 1 tablet (500 mg total) by mouth once daily. PO QAM    doxycycline (VIBRA-TABS) 100 MG tablet Take 1 tablet (100 mg total) by mouth 2 (two) times daily.    fluticasone propionate (FLONASE) 50 mcg/actuation nasal spray 2 sprays (100 mcg total) by Each Nostril route daily as needed (Nasal congestion).    fluticasone-salmeterol diskus inhaler 250-50 mcg Inhale 1 puff into the lungs 2 (two) times daily. Controller    gabapentin (NEURONTIN) 300 MG capsule Take 2 capsules (600 mg total) by mouth 2 (two) times daily.    lancets Misc To check BG one time daily, to use with insurance preferred meter    lisinopriL 10 MG tablet Take 1 tablet (10 mg total) by  mouth once daily.    loratadine (CLARITIN) 10 mg tablet Take 1 tablet (10 mg total) by mouth once daily.    metFORMIN (GLUCOPHAGE) 1000 MG tablet Take 1 tablet (1,000 mg total) by mouth 2 (two) times daily with meals.    metoprolol tartrate (LOPRESSOR) 50 MG tablet TAKE 1 TABLET(50 MG) BY MOUTH TWICE DAILY    miconazole nitrate, bulk, Powd Apply to clean dry skin twice daily    multivitamin Tab Take 1 tablet by mouth once daily.    OLANZapine (ZYPREXA) 10 MG tablet Take 1 tablet (10 mg total) by mouth every 8 (eight) hours as needed (Agitation).    pantoprazole (PROTONIX) 40 MG tablet Take 1 tablet (40 mg total) by mouth once daily.    potassium chloride (MICRO-K) 10 MEQ CpSR Take 1 capsule (10 mEq total) by mouth once daily.    pravastatin (PRAVACHOL) 40 MG tablet Take 1 tablet (40 mg total) by mouth every evening.    risperiDONE (RISPERDAL M-TABS) 3 MG disintegrating tablet Take 1 tablet (3 mg total) by mouth 2 (two) times daily.    ammonium lactate (LAC-HYDRIN) 12 % lotion APPL Y ONCE TOPICALLY TWICE DAILY FOR 30 DAYS    aspirin 81 MG Chew Take 1 tablet (81 mg total) by mouth once daily.    carBAMazepine (TEGRETOL) 200 mg tablet Take 400 mg by mouth 2 (two) times daily.    cetirizine (ZYRTEC) 10 MG tablet Take 1 tablet (10 mg total) by mouth once daily. for 10 days    DUPIXENT  mg/2 mL PnIj Inject into the skin.    hydrOXYzine (ATARAX) 50 MG tablet Take 50 mg by mouth 4 (four) times daily as needed.    [DISCONTINUED] albuterol (PROVENTIL/VENTOLIN HFA) 90 mcg/actuation inhaler Inhale 2 puffs into the lungs every 6 (six) hours as needed for Wheezing. Use with spacer  Dispense with 1 spacer    [DISCONTINUED] diclofenac sodium (VOLTAREN) 1 % Gel Apply 2 g topically 4 (four) times daily as needed (Apply to painful area up to 4 times a day as needed for pain). Apply to painful area 4 times a day as needed for pain    [DISCONTINUED] furosemide (LASIX) 20 MG tablet TAKE 1 TABLET(20 MG) BY MOUTH  EVERY DAY    [DISCONTINUED] nystatin (NYSTOP) powder APPLY TOPICALLY TO AXILLA AND BREAST FOLDS TWICE DAILY    [DISCONTINUED] QUEtiapine (SEROQUEL) 200 MG Tab Take 1 tablet (200 mg total) by mouth before breakfast.     Family History       Problem Relation (Age of Onset)    Cataracts Father    Diabetes Father, Paternal Grandfather    Heart disease Father, Paternal Grandfather    Hypertension Father    No Known Problems Mother, Sister, Brother, Maternal Aunt, Maternal Uncle, Paternal Aunt, Paternal Uncle, Maternal Grandfather    Ovarian cancer Maternal Grandmother, Paternal Grandmother          Tobacco Use    Smoking status: Every Day     Packs/day: 1.00     Years: 37.00     Pack years: 37.00     Types: Cigarettes     Last attempt to quit: 2020     Years since quittin.7    Smokeless tobacco: Never    Tobacco comments:     Enrolled in the [employer] on 5/3/14 ([facility] Member ID # [id number]). Ambulatory referral to Smoking Cessation Program   Substance and Sexual Activity    Alcohol use: No     Alcohol/week: 0.0 standard drinks    Drug use: No    Sexual activity: Yes     Partners: Male     Review of Systems   Constitutional:  Negative for appetite change, chills, diaphoresis and fever.   Respiratory:  Negative for cough, shortness of breath and wheezing.    Cardiovascular:  Negative for chest pain and palpitations.   Gastrointestinal:  Negative for abdominal pain, constipation, diarrhea, nausea and vomiting.   Genitourinary:  Negative for dysuria, flank pain, frequency, hematuria and urgency.   Musculoskeletal:  Negative for arthralgias, back pain, myalgias, neck pain and neck stiffness.   Skin:  Positive for color change and wound. Negative for pallor and rash.   Neurological:  Negative for dizziness, weakness, light-headedness and headaches.   Psychiatric/Behavioral:  Negative for agitation and confusion.    Objective:     Vital Signs (Most Recent):  Temp: 98.6 °F (37 °C) (22)  Pulse: 85  (09/07/22 1935)  Resp: 19 (09/07/22 2118)  BP: 134/69 (09/07/22 1935)  SpO2: 99 % (09/07/22 1935) Vital Signs (24h Range):  Temp:  [96.7 °F (35.9 °C)-98.6 °F (37 °C)] 98.6 °F (37 °C)  Pulse:  [83-88] 85  Resp:  [18-20] 19  SpO2:  [98 %-99 %] 99 %  BP: (134-176)/(69-74) 134/69     Weight: 106.6 kg (235 lb)  Body mass index is 37.93 kg/m².    Physical Exam  Vitals and nursing note reviewed.   Constitutional:       General: She is not in acute distress.     Appearance: She is well-developed. She is obese. She is not ill-appearing, toxic-appearing or diaphoretic.   HENT:      Head: Normocephalic and atraumatic.   Eyes:      General: No scleral icterus.        Right eye: No discharge.         Left eye: No discharge.      Conjunctiva/sclera: Conjunctivae normal.   Neck:      Trachea: No tracheal deviation.   Cardiovascular:      Rate and Rhythm: Normal rate and regular rhythm.      Heart sounds: Normal heart sounds. No murmur heard.    No gallop.   Pulmonary:      Effort: Pulmonary effort is normal. No respiratory distress.      Breath sounds: Normal breath sounds. No stridor. No wheezing or rales.   Abdominal:      General: Bowel sounds are normal. There is no distension.      Palpations: Abdomen is soft. There is no mass.      Tenderness: There is no abdominal tenderness. There is no guarding.   Musculoskeletal:         General: Deformity (Charcot's deformity of left foot) present. Normal range of motion.      Cervical back: Normal range of motion and neck supple.   Skin:     General: Skin is warm and dry.      Coloration: Skin is not pale.      Findings: Erythema (distal LLE extending into foot) present. No rash.   Neurological:      General: No focal deficit present.      Mental Status: She is alert and oriented to person, place, and time.      Cranial Nerves: No cranial nerve deficit.      Motor: No abnormal muscle tone.   Psychiatric:         Mood and Affect: Mood normal.         Behavior: Behavior normal.          Thought Content: Thought content normal.         Judgment: Judgment normal.           Significant Labs: All pertinent labs within the past 24 hours have been reviewed.  BMP:   Recent Labs   Lab 09/07/22  1531      *   K 5.2*   CL 92*   CO2 28   BUN 13   CREATININE 0.7   CALCIUM 9.3     CBC:   Recent Labs   Lab 09/07/22  1531   WBC 15.83*   HGB 10.0*   HCT 30.2*   *     CMP:   Recent Labs   Lab 09/07/22  1531   *   K 5.2*   CL 92*   CO2 28      BUN 13   CREATININE 0.7   CALCIUM 9.3   PROT 7.1   ALBUMIN 3.1*   BILITOT 0.1   ALKPHOS 112   AST 9*   ALT 8*   ANIONGAP 9     Urine Culture: No results for input(s): LABURIN in the last 48 hours.  Urine Studies: No results for input(s): COLORU, APPEARANCEUA, PHUR, SPECGRAV, PROTEINUA, GLUCUA, KETONESU, BILIRUBINUA, OCCULTUA, NITRITE, UROBILINOGEN, LEUKOCYTESUR, RBCUA, WBCUA, BACTERIA, SQUAMEPITHEL, HYALINECASTS in the last 48 hours.    Invalid input(s): WRIGHTSUR    Significant Imaging: I have reviewed all pertinent imaging results/findings within the past 24 hours.  Imaging Results              X-Ray Foot Complete Left (Final result)  Result time 09/07/22 17:29:35      Final result by Shalom Bro MD (09/07/22 17:29:35)                   Impression:      See above.      Electronically signed by: Shalom Bro MD  Date:    09/07/2022  Time:    17:29               Narrative:    EXAMINATION:  XR FOOT COMPLETE 3 VIEW LEFT    CLINICAL HISTORY:  .  Type 2 diabetes mellitus with foot ulcer    TECHNIQUE:  AP, lateral and oblique views of the left foot were performed.    COMPARISON:  04/02/2022.    FINDINGS:  Severe advanced degenerative changes are seen throughout the midfoot with chronic fragmentation which may relate to Charcot foot.  There is chronic Lisfranc deformity seen with lateral dislocation of the 2nd metatarsal in relation to the middle cuneiform.  No acute displaced fracture or dislocation seen.  Osteopenic changes are seen.   Prominent soft tissue swelling is seen over the dorsum of the foot.  Ulceration is seen at the plantar aspect of the midfoot.  Overall no significant change in the appearance of the foot from prior radiographs from 04/20/2022.

## 2022-09-08 NOTE — ASSESSMENT & PLAN NOTE
- Ms. Audrey Natarajan presents with redness, warmth and swelling of the left foot extending into the distal LLE   - h/o MRSA infection of the left foot as well as osteomyelitis of the left foot   - inflammatory markers are elevated   - x-ray without evidence for osteomyelitis   - s/p vancomycin in ED, continue   - monitor

## 2022-09-08 NOTE — SUBJECTIVE & OBJECTIVE
Interval History:  No acute overnight events.  Patient remained afebrile.  Leukocytosis resolved.  Patient continues to have pain in left foot, described it as a sharp and pressure pain.  Requests stronger pain medications.  States she is very hungry would like to eat soon.  Awaiting for Podiatry evaluation.    Review of Systems   Constitutional:  Negative for appetite change, chills, diaphoresis and fever.   Eyes:  Negative for visual disturbance.   Respiratory:  Negative for cough and shortness of breath.    Cardiovascular:  Negative for chest pain, palpitations and leg swelling.   Gastrointestinal:  Negative for abdominal pain, constipation, diarrhea, nausea and vomiting.   Genitourinary:  Negative for dysuria, flank pain, frequency, hematuria and urgency.   Musculoskeletal:  Positive for arthralgias and gait problem. Negative for back pain and myalgias.   Skin:  Positive for color change and wound. Negative for pallor and rash.   Neurological:  Negative for dizziness, syncope, weakness, light-headedness and headaches.   Psychiatric/Behavioral:  Negative for agitation and confusion.      Objective:     Vital Signs (Most Recent):  Temp: 98.1 °F (36.7 °C) (09/08/22 1129)  Pulse: 64 (09/08/22 1129)  Resp: 18 (09/08/22 1149)  BP: 115/61 (09/08/22 1129)  SpO2: (!) 94 % (09/08/22 1129)   Vital Signs (24h Range):  Temp:  [96.7 °F (35.9 °C)-98.6 °F (37 °C)] 98.1 °F (36.7 °C)  Pulse:  [64-88] 64  Resp:  [16-20] 18  SpO2:  [92 %-99 %] 94 %  BP: (115-176)/(59-74) 115/61     Weight: 105.8 kg (233 lb 4 oz)  Body mass index is 37.65 kg/m².    Intake/Output Summary (Last 24 hours) at 9/8/2022 1156  Last data filed at 9/8/2022 0830  Gross per 24 hour   Intake 1360.42 ml   Output --   Net 1360.42 ml      Physical Exam  Vitals and nursing note reviewed.   Constitutional:       General: She is not in acute distress.     Appearance: She is well-developed. She is obese. She is not ill-appearing, toxic-appearing or diaphoretic.    HENT:      Head: Normocephalic and atraumatic.      Right Ear: External ear normal.      Left Ear: External ear normal.      Nose: Nose normal.      Mouth/Throat:      Mouth: Mucous membranes are dry.   Eyes:      General: No scleral icterus.     Extraocular Movements: Extraocular movements intact.      Conjunctiva/sclera: Conjunctivae normal.   Neck:      Trachea: No tracheal deviation.   Cardiovascular:      Rate and Rhythm: Normal rate and regular rhythm.      Pulses: Normal pulses.      Heart sounds: Normal heart sounds.   Pulmonary:      Effort: Pulmonary effort is normal. No respiratory distress.      Breath sounds: Normal breath sounds. No wheezing or rales.   Abdominal:      General: Bowel sounds are normal. There is no distension.      Palpations: Abdomen is soft.      Tenderness: There is no abdominal tenderness. There is no guarding.   Musculoskeletal:         General: Swelling, tenderness and deformity (Charcot's deformity of left foot) present. Normal range of motion.      Cervical back: Normal range of motion and neck supple.      Right lower leg: No edema.      Left lower leg: No edema.      Comments: Right foot with mild swelling. Left foot wrapped in bandage. Left foot with swelling, Charcot foot and tenderness present.    Skin:     General: Skin is warm and dry.      Findings: Erythema (distal LLE extending into foot) present.      Comments: Refer to image for wound   Neurological:      General: No focal deficit present.      Mental Status: She is alert and oriented to person, place, and time.      Cranial Nerves: No cranial nerve deficit.      Motor: No abnormal muscle tone.   Psychiatric:         Mood and Affect: Mood normal.         Behavior: Behavior normal.         Thought Content: Thought content normal.         Judgment: Judgment normal.         Significant Labs: All pertinent labs within the past 24 hours have been reviewed.    Significant Imaging: I have reviewed all pertinent imaging  results/findings within the past 24 hours.

## 2022-09-08 NOTE — ASSESSMENT & PLAN NOTE
Assistance with smoking cessation was offered, including:  [x]  Medications  [x]  Counseling  []  Printed Information on Smoking Cessation  []  Referral to a Smoking Cessation Program  Not interested in quitting at this time.   Patient was counseled regarding smoking for 3-10 minutes.

## 2022-09-08 NOTE — ASSESSMENT & PLAN NOTE
"52M with h/o DM with charcot foot, DVT on Eliquis, PAD, tobacco abuse admitted 9/7 with worsening L foot wound. No systemic signs of infection. JEYSON with  hemodynamically significant stenosis in the left and DPA. Multifocal mild to moderate grade stenoses is suspected throughout the left MIRACLE and, bilateral posterior tibial and peroneal arteris. L foot wound swab- group G strep and proteus. MRI -No clear evidence of osteomyelitis with the adjacent midfoot.  No fluid collections. ID consulted for "Per podiatry for further evaluation of long term abx therapy with wound infection" on vanc only    Recommendations:   - stop vancomycin  - start cefepime  - f/u pending cultures/susceptibilties.   - tentatively planning on 2 weeks po abx on d/c, pending culture results  - if podiatry worried for osteomyelitis, would appreciate bone biopsy for path/culture to guide antibiotics  - wound care as per podiatry  - please ensure she has adequate perfusion to heal wound    Discussed with hospitalist       "

## 2022-09-08 NOTE — ASSESSMENT & PLAN NOTE
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   -Podiatry consulted

## 2022-09-08 NOTE — ASSESSMENT & PLAN NOTE
- last A1C:   Lab Results   Component Value Date    HGBA1C 7.0 (H) 04/13/2022   - hold oral antidiabetic meds   - Diabetic diet   - SSI with accuchecks AC/HS    Anti-hyperglycemic dose as follows-   Antihyperglycemics (From admission, onward)    Start     Stop Route Frequency Ordered    09/08/22 0847  insulin aspart U-100 pen 0-5 Units         -- SubQ Every 6 hours PRN 09/08/22 0747        Hold Oral hypoglycemics while patient is in the hospital.

## 2022-09-08 NOTE — HOSPITAL COURSE
52 y.o. female, with PMH of T2DM, Charcot's foot, osteomyelitis of the foot, diabetic foot ulcer, b/l LE DVTs (on Eliquis), MRSA, PAD, h/o PE, COPD, HTN, Bipolar I, tobacco abuse admitted on 09/07/2022 for LLE cellulitis with persistant wound. Presented from wound clinic for further evaluation of wound with worsening redness and warmth x 3 days. Patient with leukocytosis and elevated inflammatory markers on admission. XR left foot with severe advanced degenerative changes are seen throughout the midfoot with chronic fragmentation which may relate to Charcot foot. MRI right foot with plantar soft tissue ulceration with prominent surrounding soft tissue inflammatory changes. No clear evidence of osteomyelitis with the adjacent midfoot.  No fluid collections.  Patient started on vancomycin in the ED. Wound cx with group B Streptococcus and Proteus. ID consulted and recommends cefadroxil 500mg po bid x 14 days (est end date 9/20) and outpatient referral to allergy/immunology for PCN allergy testing given history noted for penicillin allergy (although she has received cephalosporin in the past and tolerated well) . Podiatry consulted and has no plans for surgical intervnetion. Recommends continue wound care outpatient.     Patient states she feels better.  Continues to have wound in left lower extremity, but states the swelling and pressure has improved. Pt denies any fever, headaches, vision changes, chest pain, shortness of breath, palpitations, abdominal pain, nausea, vomiting, or any new weaknesses. Feels ready to go home. Patient's exam on discharge was as follow: Patient is alert and oriented, appears in no acute distress, heart with regular rate and rhythm, lungs clear to asculation with non-labored breathing, abdomen soft, and no new weaknesses or focal deficits seen. Right foot with mild swelling. Left foot wrapped in bandage. Left foot with swelling, Charcot foot and tenderness present.      Patient was  counseled regarding any abnormal labs, differential diagnosis, treatment options, risk-benefit, lifestyle changes, prognosis, current condition, and medications. Patient was interactive and attentive.  Patient's questions were answered in a respectful and timely manner. Patient was instructed to follow-up with PCP within 1 week and to continue taking medications as prescribed.  Instructed to also follow up with podiatry and wound care as well. Also, extensively discussed the risks, benefits, and side effects of patient's medications. Discussed with patient about any medication changes. Patient verbalized understanding and agrees to treatment plan.  Patient is stable for discharge.  Patient has no other questions or concerns at this time.  ED precautions discussed with the patient.    Vital signs are stable. Ambulating without any difficulty. Tolerating p.o. intake without any nausea or vomiting. Afebrile for over 24 hours. Patient is in stable condition and has no questions or concerns. Patient will be discharge to home once transportation is secured. Prescriptions sent to pharmacy.  CM/MELVI to assist with discharge planning.

## 2022-09-08 NOTE — ASSESSMENT & PLAN NOTE
- continue home meds: depakote 500 mg AM & 1250 mg PM, olanzapine 10 mg q8hour PRN, risperidone 4 mg BID,    Thank you for choosing the Pulmonary Services Department, at [facility], as your Pulmonary Specialists.  We actively use feedback to constantly improve and deliver the best care possible. To provide the best experience, we are collecting feedback from you on how we performed.  You may receive a survey in the mail or through your e-mail to evaluate how we did. Please take a moment and share your thoughts.      If for any reason you feel that we did not meet your expectations or you want to share a positive experience, please give us a call. Your feedback helps us know how we are doing and what we can be doing better.    Office hours: 8:00 am to 4:30 pm, Monday - Friday  Phone: [phone] Option #2      YOUR TEST RESULTS    If you have any concerns regarding any testing ordered with your visit, please contact our office at the above number.    Otherwise, your test results will be communicated to you in one of the following ways:    - Follow-up office visit  - Phone call  - Through [facility]  - Mailed letter

## 2022-09-08 NOTE — PLAN OF CARE
[place] [facility]  Initial Discharge Assessment       Primary Care Provider: Donaldo Pena MD    Admission Diagnosis: Hyperkalemia [E87.5]  Diabetic foot ulcer [E11.621, L97.509]  Cellulitis of left lower extremity [L03.116]  Charcot's joint of left foot [M14.672]  Left midfoot ulcer, with necrosis of muscle [L97.423]    Admission Date: 9/7/2022  Expected Discharge Date:     Discharge Barriers Identified: None    Payor: MEDICAID / Plan: [place] / Product Type: Managed Medicaid /     Extended Emergency Contact Information  Primary Emergency Contact: AntAnitra   [facility]  Home Phone: [phone]  Relation: Sister  Secondary Emergency Contact: Tabitha Man   [facility]  Home Phone: [phone]  Relation: Other  Mother: Catia Weathers  Mobile Phone: [phone]    Discharge Plan A: Home  Discharge Plan B: Home      [id number] - [place]  [address] [place] [address] AT [address]  [place] [address]  Phone: [phone] Fax: [phone]    [facility] [address]  [place] - [address] [address]  Phone: [phone] Fax: [phone]    [facility][id number] - [place], [place] - [address] AT [facility]  [address]  [address]  [facility] [address]  Phone: [phone] Fax: [phone]    [facility] [address]  [place] - [address] [facility]  [address] [facility]  [place] [address]  Phone: [phone] Fax: [phone]    [facility][id number] - [place] - [address] AT [facility] [address]  [address] [place] [address]  Phone: [phone] Fax: [phone]    [id number] - [place] - [address] AT [facility]  [address] [place] [address]  Phone: [phone] Fax: [phone]      Initial Assessment  (most recent)       Adult Discharge Assessment - 09/08/22 0928          Discharge Assessment    Confirmed/corrected address, phone number and insurance Yes     Confirmed Demographics Correct on Facesheet     Source of Information patient;health record     Communicated HUMBERTO with patient/caregiver Date not available/Unable to determine     Reason For Admission Cellulitis     Lives With alone     Do you expect to return to your current living situation? Yes     Do you have help at home or someone to help you manage your care at home? Yes     Who are your caregiver(s) and their phone number(s)? Anitra Cain (Sister)   [phone] (Home Phone)     Prior to hospitilization cognitive status: Alert/Oriented     Current cognitive status: Alert/Oriented     Equipment Currently Used at Home walker, rolling;cane, straight;nebulizer     Readmission within 30 days? No     Patient currently being followed by outpatient case management? No     Do you currently have service(s) that help you manage your care at home? No     Do you take prescription medications? Yes     Do you have prescription coverage? Yes     Coverage medicaid     Do you have any problems affording any of your prescribed medications? No     Is the patient taking medications as prescribed? yes     Who is going to help you get home at discharge? Anitra Cain (Sister)   [phone] (Home Phone)     How do you get to doctors appointments? car, drives self;family or friend will provide     Are you on dialysis? No     Do you take coumadin? No     Discharge Plan A Home     Discharge Plan B Home     DME Needed Upon Discharge  none     Discharge Barriers Identified None        Physical Activity    On average, how many days per week do you engage in moderate to strenuous exercise (like a brisk walk)? Patient refused     On average, how many minutes do you engage in exercise at this level? Patient refused        Financial Resource Strain    How hard is it for you to  pay for the very basics like food, housing, medical care, and heating? Patient refused        Housing Stability    In the last 12 months, was there a time when you were not able to pay the mortgage or rent on time? Patient refused     In the last 12 months, was there a time when you did not have a steady place to sleep or slept in a shelter (including now)? Patient refused        Transportation Needs    In the past 12 months, has lack of transportation kept you from medical appointments or from getting medications? Patient refused     In the past 12 months, has lack of transportation kept you from meetings, work, or from getting things needed for daily living? Patient refused        Food Insecurity    Within the past 12 months, you worried that your food would run out before you got the money to buy more. Patient refused     Within the past 12 months, the food you bought just didn't last and you didn't have money to get more. Patient refused        Stress    Do you feel stress - tense, restless, nervous, or anxious, or unable to sleep at night because your mind is troubled all the time - these days? Patient refused        Social Connections    In a typical week, how many times do you talk on the phone with family, friends, or neighbors? Patient refused     How often do you get together with friends or relatives? Patient refused     How often do you attend Religious or Yazidi services? Patient refused     Do you belong to any clubs or organizations such as Religious groups, unions, fraternal or athletic groups, or school groups? Patient refused     How often do you attend meetings of the clubs or organizations you belong to? Patient refused     Are you , , , , never , or living with a partner? Patient refused        Alcohol Use    Q1: How often do you have a drink containing alcohol? Patient refused     Q2: How many drinks containing alcohol do you have on a typical day when you are  drinking? Patient refused     Q3: How often do you have six or more drinks on one occasion? Patient refused

## 2022-09-08 NOTE — ASSESSMENT & PLAN NOTE
- hypertensive at present   - home meds: lisinopril 10 mg QD, metoprolol tartrate 50 mg BID  - monitor

## 2022-09-08 NOTE — PROGRESS NOTES
Vancomycin consult follow-up:    Patient reviewed, renal function stable, no new levels, continue current therapy; Next levels due: trough due 9/9/2022 at 0430

## 2022-09-08 NOTE — ASSESSMENT & PLAN NOTE
- Patient was counseled regarding smoking for 3-10 minutes.  - Encourage smoking cessation daily  - Not interested in quitting at this time.

## 2022-09-09 ENCOUNTER — TELEPHONE (OUTPATIENT)
Dept: PODIATRY | Facility: CLINIC | Age: 52
End: 2022-09-09
Payer: MEDICAID

## 2022-09-09 VITALS
DIASTOLIC BLOOD PRESSURE: 59 MMHG | HEIGHT: 66 IN | HEART RATE: 65 BPM | OXYGEN SATURATION: 93 % | SYSTOLIC BLOOD PRESSURE: 128 MMHG | TEMPERATURE: 98 F | RESPIRATION RATE: 16 BRPM | BODY MASS INDEX: 37.49 KG/M2 | WEIGHT: 233.25 LBS

## 2022-09-09 LAB
ALBUMIN SERPL BCP-MCNC: 2.7 G/DL (ref 3.5–5.2)
ALP SERPL-CCNC: 102 U/L (ref 55–135)
ALT SERPL W/O P-5'-P-CCNC: 5 U/L (ref 10–44)
ANION GAP SERPL CALC-SCNC: 9 MMOL/L (ref 8–16)
AST SERPL-CCNC: 8 U/L (ref 10–40)
BACTERIA SPEC AEROBE CULT: ABNORMAL
BACTERIA SPEC AEROBE CULT: ABNORMAL
BASOPHILS # BLD AUTO: 0.1 K/UL (ref 0–0.2)
BASOPHILS NFR BLD: 1.1 % (ref 0–1.9)
BILIRUB SERPL-MCNC: 0.2 MG/DL (ref 0.1–1)
BUN SERPL-MCNC: 11 MG/DL (ref 6–20)
CALCIUM SERPL-MCNC: 8.7 MG/DL (ref 8.7–10.5)
CHLORIDE SERPL-SCNC: 94 MMOL/L (ref 95–110)
CO2 SERPL-SCNC: 29 MMOL/L (ref 23–29)
CREAT SERPL-MCNC: 0.6 MG/DL (ref 0.5–1.4)
DIFFERENTIAL METHOD: ABNORMAL
EOSINOPHIL # BLD AUTO: 0.4 K/UL (ref 0–0.5)
EOSINOPHIL NFR BLD: 3.7 % (ref 0–8)
ERYTHROCYTE [DISTWIDTH] IN BLOOD BY AUTOMATED COUNT: 18.6 % (ref 11.5–14.5)
EST. GFR  (NO RACE VARIABLE): >60 ML/MIN/1.73 M^2
GLUCOSE SERPL-MCNC: 119 MG/DL (ref 70–110)
HCT VFR BLD AUTO: 31.2 % (ref 37–48.5)
HGB BLD-MCNC: 9.8 G/DL (ref 12–16)
IMM GRANULOCYTES # BLD AUTO: 0.05 K/UL (ref 0–0.04)
IMM GRANULOCYTES NFR BLD AUTO: 0.5 % (ref 0–0.5)
LYMPHOCYTES # BLD AUTO: 4.5 K/UL (ref 1–4.8)
LYMPHOCYTES NFR BLD: 48.1 % (ref 18–48)
MAGNESIUM SERPL-MCNC: 1.7 MG/DL (ref 1.6–2.6)
MCH RBC QN AUTO: 23.3 PG (ref 27–31)
MCHC RBC AUTO-ENTMCNC: 31.4 G/DL (ref 32–36)
MCV RBC AUTO: 74 FL (ref 82–98)
MONOCYTES # BLD AUTO: 1.1 K/UL (ref 0.3–1)
MONOCYTES NFR BLD: 11.3 % (ref 4–15)
NEUTROPHILS # BLD AUTO: 3.3 K/UL (ref 1.8–7.7)
NEUTROPHILS NFR BLD: 35.3 % (ref 38–73)
NRBC BLD-RTO: 0 /100 WBC
PLATELET # BLD AUTO: 743 K/UL (ref 150–450)
PMV BLD AUTO: 8.9 FL (ref 9.2–12.9)
POCT GLUCOSE: 142 MG/DL (ref 70–110)
POCT GLUCOSE: 179 MG/DL (ref 70–110)
POTASSIUM SERPL-SCNC: 4.6 MMOL/L (ref 3.5–5.1)
PROT SERPL-MCNC: 6.4 G/DL (ref 6–8.4)
RBC # BLD AUTO: 4.21 M/UL (ref 4–5.4)
SODIUM SERPL-SCNC: 132 MMOL/L (ref 136–145)
WBC # BLD AUTO: 9.42 K/UL (ref 3.9–12.7)

## 2022-09-09 PROCEDURE — 99232 PR SUBSEQUENT HOSPITAL CARE,LEVL II: ICD-10-PCS | Mod: ,,, | Performed by: INTERNAL MEDICINE

## 2022-09-09 PROCEDURE — 99232 PR SUBSEQUENT HOSPITAL CARE,LEVL II: ICD-10-PCS | Mod: ,,, | Performed by: PODIATRIST

## 2022-09-09 PROCEDURE — 85025 COMPLETE CBC W/AUTO DIFF WBC: CPT | Performed by: PHYSICIAN ASSISTANT

## 2022-09-09 PROCEDURE — 36415 COLL VENOUS BLD VENIPUNCTURE: CPT | Performed by: PHYSICIAN ASSISTANT

## 2022-09-09 PROCEDURE — 99232 SBSQ HOSP IP/OBS MODERATE 35: CPT | Mod: ,,, | Performed by: INTERNAL MEDICINE

## 2022-09-09 PROCEDURE — 25000003 PHARM REV CODE 250: Performed by: STUDENT IN AN ORGANIZED HEALTH CARE EDUCATION/TRAINING PROGRAM

## 2022-09-09 PROCEDURE — 80053 COMPREHEN METABOLIC PANEL: CPT | Performed by: PHYSICIAN ASSISTANT

## 2022-09-09 PROCEDURE — 94640 AIRWAY INHALATION TREATMENT: CPT

## 2022-09-09 PROCEDURE — 99900035 HC TECH TIME PER 15 MIN (STAT)

## 2022-09-09 PROCEDURE — 25000003 PHARM REV CODE 250: Performed by: PHYSICIAN ASSISTANT

## 2022-09-09 PROCEDURE — 99232 SBSQ HOSP IP/OBS MODERATE 35: CPT | Mod: ,,, | Performed by: PODIATRIST

## 2022-09-09 PROCEDURE — 83735 ASSAY OF MAGNESIUM: CPT | Performed by: PHYSICIAN ASSISTANT

## 2022-09-09 RX ORDER — CEFADROXIL 500 MG/1
1 CAPSULE ORAL EVERY 12 HOURS
Qty: 40 CAPSULE | Refills: 0 | Status: SHIPPED | OUTPATIENT
Start: 2022-09-09 | End: 2022-09-09 | Stop reason: SDUPTHER

## 2022-09-09 RX ORDER — HYDROCODONE BITARTRATE AND ACETAMINOPHEN 10; 325 MG/1; MG/1
1 TABLET ORAL EVERY 12 HOURS PRN
Qty: 8 TABLET | Refills: 0 | Status: SHIPPED | OUTPATIENT
Start: 2022-09-09 | End: 2022-09-13

## 2022-09-09 RX ORDER — CEFADROXIL 500 MG/1
1 CAPSULE ORAL EVERY 12 HOURS
Qty: 56 CAPSULE | Refills: 0 | Status: SHIPPED | OUTPATIENT
Start: 2022-09-09 | End: 2022-09-23

## 2022-09-09 RX ADMIN — DIVALPROEX SODIUM 500 MG: 250 TABLET, DELAYED RELEASE ORAL at 08:09

## 2022-09-09 RX ADMIN — LISINOPRIL 10 MG: 5 TABLET ORAL at 08:09

## 2022-09-09 RX ADMIN — HYDROCODONE BITARTRATE AND ACETAMINOPHEN 1 TABLET: 10; 325 TABLET ORAL at 07:09

## 2022-09-09 RX ADMIN — FLUTICASONE FUROATE AND VILANTEROL TRIFENATATE 1 PUFF: 100; 25 POWDER RESPIRATORY (INHALATION) at 08:09

## 2022-09-09 RX ADMIN — MUPIROCIN: 20 OINTMENT TOPICAL at 08:09

## 2022-09-09 RX ADMIN — METOPROLOL TARTRATE 50 MG: 50 TABLET, FILM COATED ORAL at 08:09

## 2022-09-09 RX ADMIN — BUMETANIDE 1 MG: 1 TABLET ORAL at 08:09

## 2022-09-09 RX ADMIN — GABAPENTIN 600 MG: 300 CAPSULE ORAL at 08:09

## 2022-09-09 RX ADMIN — RISPERIDONE 3 MG: 1 TABLET ORAL at 08:09

## 2022-09-09 RX ADMIN — ASPIRIN 81 MG CHEWABLE TABLET 81 MG: 81 TABLET CHEWABLE at 08:09

## 2022-09-09 RX ADMIN — APIXABAN 5 MG: 5 TABLET, FILM COATED ORAL at 08:09

## 2022-09-09 NOTE — CONSULTS
"[facility]  Infectious Disease  Consult Note    Patient Name: Audrey Natarajan  MRN: [id number]  Admission Date: 9/7/2022  Hospital Length of Stay: 2 days  Attending Physician: Velvet Galicia DO  Primary Care Provider: Donaldo Pena MD     Isolation Status: No active isolations    Patient information was obtained from patient and ER records.      Consults  Assessment/Plan:     * Cellulitis of left lower extremity  52M with h/o DM with charcot foot, DVT on Eliquis, PAD, tobacco abuse admitted 9/7 with worsening L foot wound. No systemic signs of infection. JEYSON with  hemodynamically significant stenosis in the left and DPA. Multifocal mild to moderate grade stenoses is suspected throughout the left MIRACLE and, bilateral posterior tibial and peroneal arteris. L foot wound swab- group G strep and proteus. MRI -No clear evidence of osteomyelitis with the adjacent midfoot.  No fluid collections. ID consulted for "Per podiatry for further evaluation of long term abx therapy with wound infection" on vanc only    Note pt with PCN allergy, but has tolerated keflex and ancef    Recommendations:   - cefadroxil 500mg po bid x 14 days (est end date 9/20)  - outpatient referral to allergy/immunology for PCN allergy testing  - wound care as per podiatry  - please ensure she has adequate perfusion to heal wound    Discussed with hospitalist             Thank you for your consult. I will sign off. Please contact us if you have any additional questions.    Beverly Zavala MD  Infectious Disease  [facility] - Med [facility]    Subjective:     Principal Problem: Cellulitis of left lower extremity    HPI:   52M with h/o DM with charcot foot, DVT on Eliquis, PAD, tobacco abuse admitted 9/7 with worsening L foot wound. Notably, pt was seen by ID 5/2022 for b/l foot OM/mrsa bacteremia and recommended 6 weeks iv vancomycin, but patient never came for follow up. Pt reports L leg wound has worsened over past few days. Denies f/c. Notably " "patient has refused amputation in the past. Says she completed prior iv abx and didn't come for f/u because she was admitted. Says wound healed with last course of antibiotics and recently got worse      JEYSON with  hemodynamically significant stenosis in the left and DPA. Multifocal mild to moderate grade stenoses is suspected throughout the left MIRACLE and, bilateral posterior tibial and peroneal arteries.      L foot wound swab  Specimen Information: Foot, Left; Wound    0 Result Notes  Component 1 d ago    Aerobic Bacterial Culture  Abnormal   STREPTOCOCCUS GROUP G   Many   Beta-hemolytic streptococci are routinely susceptible to   penicillins,cephalosporins and carbapenems.   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     Plantar soft tissue ulceration with prominent surrounding soft tissue inflammatory changes. No clear evidence of osteomyelitis with the adjacent midfoot.  No fluid collections    ID consulted for "Per podiatry for further evaluation of long term abx therapy with wound infection"           Interval history: NAEO. Wants to go home    Past Surgical History:   Procedure Laterality Date    ABDOMINAL SURGERY  2010    gastric sleeve    BILATERAL OOPHORECTOMY Bilateral 1/12/2015    CHOLECYSTECTOMY      DEBRIDEMENT OF FOOT Bilateral 5/10/2022    Procedure: DEBRIDEMENT, FOOT;  Surgeon: Maira De Los Santos DPM;  Location: [facility] OR;  Service: Podiatry;  Laterality: Bilateral;    Green' s filter Right 7/4/2012    Right Neck & Tunneled Down.    HERNIA REPAIR      "[place] [facility] [place] [employer] around th [place].", pt. states    LAPAROSCOPIC CHOLECYSTECTOMY N/A 9/10/2020    Procedure: CHOLECYSTECTOMY, LAPAROSCOPIC;  Surgeon: Montrell Gutierrez MD;  Location: [facility] OR;  Service: General;  Laterality: N/A;  RN PREOP 9/9----COVID Negative  9/9    " OVARIAN CYST REMOVAL  3/13/2014    UT REMOVAL OF OVARY/TUBE(S)      SPLENECTOMY, TOTAL  July 2003    TONSILLECTOMY      as a child    TYMPANOSTOMY TUBE PLACEMENT  1976    VEIN SURGERY  2003    Lt leg       Review of patient's allergies indicates:   Allergen Reactions    Morphine Other (See Comments)     Patient had a psychotic episode after taking Morphine  Agitation, hallucinations    Penicillins Anaphylaxis     itching    Januvia [sitagliptin] Hives    Carbamazepine Other (See Comments)     hyponatremia       No current facility-administered medications on file prior to encounter.     Current Outpatient Medications on File Prior to Encounter   Medication Sig    acetaminophen (TYLENOL) 500 MG tablet Take 2 tablets (1,000 mg total) by mouth every 6 (six) hours as needed for Pain.    albuterol-ipratropium (DUO-NEB) 2.5 mg-0.5 mg/3 mL nebulizer solution Take 3 mLs by nebulization every 6 (six) hours as needed for Wheezing or Shortness of Breath. Rescue    apixaban (ELIQUIS) 5 mg Tab Take 1 tablet (5 mg total) by mouth 2 (two) times daily.    blood sugar diagnostic Strp To check BG one time daily, to use with insurance preferred meter    blood-glucose meter kit To check BG one time daily, to use with insurance preferred meter    bumetanide (BUMEX) 1 MG tablet Take 1 tablet (1 mg total) by mouth once daily.    divalproex (DEPAKOTE) 250 MG EC tablet Take 5 tablets (1,250 mg total) by mouth every evening.    divalproex (DEPAKOTE) 500 MG TbEC Take 1 tablet (500 mg total) by mouth once daily. PO QAM    fluticasone propionate (FLONASE) 50 mcg/actuation nasal spray 2 sprays (100 mcg total) by Each Nostril route daily as needed (Nasal congestion).    fluticasone-salmeterol diskus inhaler 250-50 mcg Inhale 1 puff into the lungs 2 (two) times daily. Controller    gabapentin (NEURONTIN) 300 MG capsule Take 2 capsules (600 mg total) by mouth 2 (two) times daily.    lancets Misc To check BG one time daily, to  use with insurance preferred meter    lisinopriL 10 MG tablet Take 1 tablet (10 mg total) by mouth once daily.    loratadine (CLARITIN) 10 mg tablet Take 1 tablet (10 mg total) by mouth once daily.    metFORMIN (GLUCOPHAGE) 1000 MG tablet Take 1 tablet (1,000 mg total) by mouth 2 (two) times daily with meals.    metoprolol tartrate (LOPRESSOR) 50 MG tablet TAKE 1 TABLET(50 MG) BY MOUTH TWICE DAILY    miconazole nitrate, bulk, Powd Apply to clean dry skin twice daily    multivitamin Tab Take 1 tablet by mouth once daily.    OLANZapine (ZYPREXA) 10 MG tablet Take 1 tablet (10 mg total) by mouth every 8 (eight) hours as needed (Agitation).    pantoprazole (PROTONIX) 40 MG tablet Take 1 tablet (40 mg total) by mouth once daily.    potassium chloride (MICRO-K) 10 MEQ CpSR Take 1 capsule (10 mEq total) by mouth once daily.    pravastatin (PRAVACHOL) 40 MG tablet Take 1 tablet (40 mg total) by mouth every evening.    risperiDONE (RISPERDAL M-TABS) 3 MG disintegrating tablet Take 1 tablet (3 mg total) by mouth 2 (two) times daily.    [DISCONTINUED] doxycycline (VIBRA-TABS) 100 MG tablet Take 1 tablet (100 mg total) by mouth 2 (two) times daily.    ammonium lactate (LAC-HYDRIN) 12 % lotion APPL Y ONCE TOPICALLY TWICE DAILY FOR 30 DAYS    aspirin 81 MG Chew Take 1 tablet (81 mg total) by mouth once daily.    carBAMazepine (TEGRETOL) 200 mg tablet Take 400 mg by mouth 2 (two) times daily.    cetirizine (ZYRTEC) 10 MG tablet Take 1 tablet (10 mg total) by mouth once daily. for 10 days    DUPIXENT  mg/2 mL PnIj Inject into the skin.    hydrOXYzine (ATARAX) 50 MG tablet Take 50 mg by mouth 4 (four) times daily as needed.    [DISCONTINUED] albuterol (PROVENTIL/VENTOLIN HFA) 90 mcg/actuation inhaler Inhale 2 puffs into the lungs every 6 (six) hours as needed for Wheezing. Use with spacer  Dispense with 1 spacer    [DISCONTINUED] diclofenac sodium (VOLTAREN) 1 % Gel Apply 2 g topically 4 (four) times daily  as needed (Apply to painful area up to 4 times a day as needed for pain). Apply to painful area 4 times a day as needed for pain    [DISCONTINUED] furosemide (LASIX) 20 MG tablet TAKE 1 TABLET(20 MG) BY MOUTH EVERY DAY    [DISCONTINUED] nystatin (NYSTOP) powder APPLY TOPICALLY TO AXILLA AND BREAST FOLDS TWICE DAILY    [DISCONTINUED] QUEtiapine (SEROQUEL) 200 MG Tab Take 1 tablet (200 mg total) by mouth before breakfast.     Family History       Problem Relation (Age of Onset)    Cataracts Father    Diabetes Father, Paternal Grandfather    Heart disease Father, Paternal Grandfather    Hypertension Father    No Known Problems Mother, Sister, Brother, Maternal Aunt, Maternal Uncle, Paternal Aunt, Paternal Uncle, Maternal Grandfather    Ovarian cancer Maternal Grandmother, Paternal Grandmother          Tobacco Use    Smoking status: Every Day     Packs/day: 1.00     Years: 37.00     Pack years: 37.00     Types: Cigarettes     Last attempt to quit: 2020     Years since quittin.7    Smokeless tobacco: Never    Tobacco comments:     Enrolled in the [employer] on 5/3/14 ([facility] Member ID # [id number]). Ambulatory referral to Smoking Cessation Program   Substance and Sexual Activity    Alcohol use: No     Alcohol/week: 0.0 standard drinks    Drug use: No    Sexual activity: Yes     Partners: Male     Review of Systems   Constitutional:  Negative for appetite change, chills, diaphoresis and fever.   Respiratory:  Negative for cough, shortness of breath and wheezing.    Cardiovascular:  Negative for chest pain and palpitations.   Gastrointestinal:  Negative for abdominal pain, constipation, diarrhea, nausea and vomiting.   Genitourinary:  Negative for dysuria, flank pain, frequency, hematuria and urgency.   Musculoskeletal:  Negative for arthralgias, back pain, myalgias, neck pain and neck stiffness.   Skin:  Positive for color change and wound. Negative for pallor and rash.   Neurological:  Negative for  dizziness, weakness, light-headedness and headaches.   Psychiatric/Behavioral:  Negative for agitation and confusion.    Objective:     Vital Signs (Most Recent):  Temp: 98.1 °F (36.7 °C) (09/09/22 1115)  Pulse: 65 (09/09/22 1115)  Resp: 16 (09/09/22 1115)  BP: (!) 128/59 (09/09/22 1115)  SpO2: (!) 93 % (09/09/22 1115) Vital Signs (24h Range):  Temp:  [97.6 °F (36.4 °C)-98.1 °F (36.7 °C)] 98.1 °F (36.7 °C)  Pulse:  [65-83] 65  Resp:  [16-20] 16  SpO2:  [92 %-95 %] 93 %  BP: (114-162)/(56-73) 128/59     Weight: 105.8 kg (233 lb 4 oz)  Body mass index is 37.65 kg/m².    Physical Exam  Vitals and nursing note reviewed.   Constitutional:       General: She is not in acute distress.     Appearance: She is well-developed. She is obese. She is not ill-appearing, toxic-appearing or diaphoretic.   HENT:      Head: Normocephalic and atraumatic.   Eyes:      General: No scleral icterus.        Right eye: No discharge.         Left eye: No discharge.      Conjunctiva/sclera: Conjunctivae normal.   Neck:      Trachea: No tracheal deviation.   Cardiovascular:      Rate and Rhythm: Normal rate and regular rhythm.      Heart sounds: Normal heart sounds. No murmur heard.    No gallop.   Pulmonary:      Effort: Pulmonary effort is normal. No respiratory distress.      Breath sounds: Normal breath sounds. No stridor. No wheezing or rales.   Abdominal:      General: Bowel sounds are normal. There is no distension.      Palpations: Abdomen is soft. There is no mass.      Tenderness: There is no abdominal tenderness. There is no guarding.   Musculoskeletal:         General: Deformity (Charcot's deformity of left foot) present. Normal range of motion.      Cervical back: Normal range of motion and neck supple.   Skin:     General: Skin is warm and dry.      Coloration: Skin is not pale.      Findings: Erythema (distal LLE extending into foot) present. No rash.   Neurological:      General: No focal deficit present.      Mental Status: She  is alert and oriented to person, place, and time.      Cranial Nerves: No cranial nerve deficit.      Motor: No abnormal muscle tone.   Psychiatric:         Mood and Affect: Mood normal.         Behavior: Behavior normal.         Thought Content: Thought content normal.         Judgment: Judgment normal.             Significant Labs: All pertinent labs within the past 24 hours have been reviewed.  BMP:   Recent Labs   Lab 09/09/22  0336   *   *   K 4.6   CL 94*   CO2 29   BUN 11   CREATININE 0.6   CALCIUM 8.7   MG 1.7       CBC:   Recent Labs   Lab 09/07/22  1531 09/08/22  0546 09/09/22  0336   WBC 15.83* 10.81 9.42   HGB 10.0* 9.3* 9.8*   HCT 30.2* 29.1* 31.2*   * 673* 743*       CMP:   Recent Labs   Lab 09/07/22  1531 09/08/22  0544 09/09/22  0336   * 128* 132*   K 5.2* 4.5 4.6   CL 92* 92* 94*   CO2 28 28 29    107 119*   BUN 13 10 11   CREATININE 0.7 0.6 0.6   CALCIUM 9.3 8.7 8.7   PROT 7.1 6.3 6.4   ALBUMIN 3.1* 2.7* 2.7*   BILITOT 0.1 0.2 0.2   ALKPHOS 112 103 102   AST 9* 9* 8*   ALT 8* 8* 5*   ANIONGAP 9 8 9       Urine Culture: No results for input(s): LABURIN in the last 48 hours.  Urine Studies: No results for input(s): COLORU, APPEARANCEUA, PHUR, SPECGRAV, PROTEINUA, GLUCUA, KETONESU, BILIRUBINUA, OCCULTUA, NITRITE, UROBILINOGEN, LEUKOCYTESUR, RBCUA, WBCUA, BACTERIA, SQUAMEPITHEL, HYALINECASTS in the last 48 hours.    Invalid input(s): JUDI    Significant Imaging: I have reviewed all pertinent imaging results/findings within the past 24 hours.  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    Plantar soft tissue ulceration with prominent surrounding soft tissue inflammatory changes. No clear evidence of osteomyelitis with the adjacent midfoot.   No fluid collections.      Electronically signed by: Israel Mabry MD  Date:    09/08/2022  Time:    11:39               Narrative:    EXAMINATION:  MRI FOOT (MIDFOOT) LEFT W W/O CONTRAST    CLINICAL HISTORY:  Foot swelling, diabetic, osteomyelitis suspected, xray done;  Cellulitis of left lower limb    TECHNIQUE:  Routine left multisequence multiplanar MRI forefoot protocol performed with the administration of 7.5 cc of Gadavist IV contrast.    COMPARISON:  05/05/2022    FINDINGS:  There is advanced destructive neuropathic arthropathy involving the joints of the midfoot.  Scattered associated marrow edema/increased T2 signal noted, likely reactive from the underlying arthropathy.  There is soft tissue ulceration noted along the plantar aspect of the midfoot with prominent surrounding soft tissue inflammatory changes.  No adjacent osteomyelitis identified contiguous with this soft tissue inflammation.  No fluid collections identified.  Muscle atrophy noted.                                       X-Ray Foot Complete Left (Final result)  Result time 09/07/22 17:29:35      Final result by Shalom Bro MD (09/07/22 17:29:35)                   Impression:      See above.      Electronically signed by: Shalom Bro MD  Date:    09/07/2022  Time:    17:29               Narrative:    EXAMINATION:  XR FOOT COMPLETE 3 VIEW LEFT    CLINICAL HISTORY:  .  Type 2 diabetes mellitus with foot ulcer    TECHNIQUE:  AP, lateral and oblique views of the left foot were performed.    COMPARISON:  04/02/2022.    FINDINGS:  Severe advanced degenerative changes are seen throughout the midfoot with chronic fragmentation which may relate to Charcot foot.  There is chronic Lisfranc deformity seen with lateral dislocation of the 2nd metatarsal in relation to the middle cuneiform.  No acute displaced fracture or dislocation seen.  Osteopenic changes are seen.  Prominent soft tissue swelling is seen over the dorsum of the foot.  Ulceration  is seen at the plantar aspect of the midfoot.  Overall no significant change in the appearance of the foot from prior radiographs from 04/20/2022.

## 2022-09-09 NOTE — PLAN OF CARE
[facility] [place] [facility] Surg  Discharge Final Note    Primary Care Provider: Donaldo Pena MD    Expected Discharge Date: 9/9/2022    All needs met. Appointment scheduled. Dr. De Los Santos's office will contact patient with next appointment. Wound Care supplies will be delivered to patient's home this weekend, per Denia in wound care clinic. SW notified nurse Jonathan that patient is ready for discharge from case management standpoint.     Final Discharge Note (most recent)       Final Note - 09/09/22 1032          Final Note    Assessment Type Final Discharge Note     Anticipated Discharge Disposition Home or Self Care     What phone number can be called within the next 1-3 days to see how you are doing after discharge? [phone]     Hospital Resources/Appts/Education Provided Appointments scheduled by Navigator/Coordinator;Appointments scheduled and added to AVS        Post-Acute Status    Post-Acute Authorization Other     Coverage Medicaid     Other Status No Post-Acute Service Needs     Discharge Delays None known at this time                     Important Message from Medicare             Contact Info       Maira De Los Santos DPM   Specialty: Podiatry, Wound Care    [address] [place] [address]  [place] [id number]   Phone: [phone]       Next Steps: Follow up    Instructions: Clinic will call to schedule follow up appointment. Wound Care supplies will be delivered to your home some time this weekend. If you have any questions about supplies, please contact the wound care clinic.

## 2022-09-09 NOTE — NURSING
Discharge instructions/education given to patient. Patient verbalized understanding. Medications delivered to bedside. IV access removed. Telemetry monitor removed. Patient has all belongings.

## 2022-09-09 NOTE — DISCHARGE SUMMARY
[facility] Medicine  Discharge Summary      Patient Name: Audrey Natarajan  MRN: [id number]  Patient Class: IP- Inpatient  Admission Date: 9/7/2022  Hospital Length of Stay: 2 days  Discharge Date and Time:  09/09/2022 3:40 PM  Attending Physician: No att. providers found   Discharging Provider: Velvet Galicia DO  Primary Care Provider: Donaldo Pena MD      HPI:   Ms. Audrey Natarajan is a 52 y.o. female, with PMH of T2DM, Charcot's foot, osteomyelitis of the foot, diabetic foot ulcer, b/l LE DVTs (on Eliquis), MRSA, PAD, h/o PE, COPD, HTN, Bipolar I, tobacco abuse, who presented to [facility] ED on 9/7/22 for a wound check of her left leg where she had a wound with worsening redness and warmth x 3 days. She notes associated pain. She follows with Dr. De Los Santos (Podiatry) every Friday, and Wound Care every Wednesday. She was evaluated in the ED with labs showing leukocytosis of 15k with left shift, anemia with H&H of 10.0/30.2. A metabolic panel showed sodium of 129, with potassium of 5.2. Inflammatory markers were elevated with ESR of 37, and CRP of 33.3. An x-ray of the foot showed degenerative changes in the midfoot of chronic issues related to Charcot foot as well as other chronic findings and soft tissue swelling over the dorsum of the foot, and ulceration of the plantar surface of the midfoot. All of these findings were without significant change from prior imaging on 4/20/22. She was treated in the ED with vancomycin. She was admitted to inpatient status.       * No surgery found *      Hospital Course:   52 y.o. female, with PMH of T2DM, Charcot's foot, osteomyelitis of the foot, diabetic foot ulcer, b/l LE DVTs (on Eliquis), MRSA, PAD, h/o PE, COPD, HTN, Bipolar I, tobacco abuse admitted on 09/07/2022 for LLE cellulitis with persistant wound. Presented from wound clinic for further evaluation of wound with worsening redness and warmth x 3 days. Patient with leukocytosis and elevated  inflammatory markers on admission. XR left foot with severe advanced degenerative changes are seen throughout the midfoot with chronic fragmentation which may relate to Charcot foot. MRI right foot with plantar soft tissue ulceration with prominent surrounding soft tissue inflammatory changes. No clear evidence of osteomyelitis with the adjacent midfoot.  No fluid collections.  Patient started on vancomycin in the ED. Wound cx with group B Streptococcus and Proteus. ID consulted and recommends cefadroxil 500mg po bid x 14 days (est end date 9/20) and outpatient referral to allergy/immunology for PCN allergy testing given history noted for penicillin allergy (although she has received cephalosporin in the past and tolerated well) . Podiatry consulted and has no plans for surgical intervnetion. Recommends continue wound care outpatient.     Patient states she feels better.  Continues to have wound in left lower extremity, but states the swelling and pressure has improved. Pt denies any fever, headaches, vision changes, chest pain, shortness of breath, palpitations, abdominal pain, nausea, vomiting, or any new weaknesses. Feels ready to go home. Patient's exam on discharge was as follow: Patient is alert and oriented, appears in no acute distress, heart with regular rate and rhythm, lungs clear to asculation with non-labored breathing, abdomen soft, and no new weaknesses or focal deficits seen. Right foot with mild swelling. Left foot wrapped in bandage. Left foot with swelling, Charcot foot and tenderness present.      Patient was counseled regarding any abnormal labs, differential diagnosis, treatment options, risk-benefit, lifestyle changes, prognosis, current condition, and medications. Patient was interactive and attentive.  Patient's questions were answered in a respectful and timely manner. Patient was instructed to follow-up with PCP within 1 week and to continue taking medications as prescribed.  Instructed to  also follow up with podiatry and wound care as well. Also, extensively discussed the risks, benefits, and side effects of patient's medications. Discussed with patient about any medication changes. Patient verbalized understanding and agrees to treatment plan.  Patient is stable for discharge.  Patient has no other questions or concerns at this time.  ED precautions discussed with the patient.    Vital signs are stable. Ambulating without any difficulty. Tolerating p.o. intake without any nausea or vomiting. Afebrile for over 24 hours. Patient is in stable condition and has no questions or concerns. Patient will be discharge to home once transportation is secured. Prescriptions sent to pharmacy.  CM/SW to assist with discharge planning.          Goals of Care Treatment Preferences:  Code Status: Full Code    Living Will: Yes              Consults:   Consults (From admission, onward)        Status Ordering Provider     Inpatient consult to Infectious Diseases  Once        Provider:  Beverly Zavala MD    Completed LUIGI SHAW     Inpatient consult to Podiatry  Once        Provider:  Maira De Los Santos DPM    Completed CHANI DE OLIVEIRA          No new Assessment & Plan notes have been filed under this hospital service since the last note was generated.  Service: Hospital Medicine    Final Active Diagnoses:    Diagnosis Date Noted POA    PRINCIPAL PROBLEM:  Cellulitis of left lower extremity [L03.116] 07/16/2016 Yes    Open wound of left foot [S91.302A] 09/08/2022 Yes    Charcot's joint of left foot [M14.672]  Yes    Type 2 diabetes mellitus [E11.9] 09/26/2016 Yes    Essential hypertension [I10] 06/06/2013 Yes     Chronic    Hyperlipidemia [E78.5] 02/13/2014 Yes    PAD (peripheral artery disease) [I73.9] 05/06/2022 Yes    Chronic deep vein thrombosis (DVT) of both lower extremities [I82.503] 04/13/2022 Yes    COPD (chronic obstructive pulmonary disease) [J44.9] 10/11/2013 Yes     Chronic    Iron  deficiency anemia [D50.9] 05/06/2022 Yes    Thrombocytosis [D75.839] 07/16/2016 Yes    Bipolar 1 disorder [F31.9] 04/13/2015 Yes     Chronic    Tobacco abuse [Z72.0] 03/06/2014 Yes     Chronic      Problems Resolved During this Admission:       Discharged Condition: stable    Disposition: Home or Self Care    Follow Up:   Follow-up Information     Maira De Los Santos DPM Follow up.    Specialties: Podiatry, Wound Care  Why: Clinic will call to schedule follow up appointment. Wound Care supplies will be delivered to your home some time this weekend. If you have any questions about supplies, please contact [phone].  Contact information:  [address] [place] [address] [phone]                       Patient Instructions:      Ambulatory referral/consult to Allergy   Standing Status: Future   Referral Priority: Routine Referral Type: Allergy Testing   Referral Reason: Specialty Services Required   Requested Specialty: Allergy   Number of Visits Requested: 1     Diet diabetic     Diet Cardiac     Notify your health care provider if you experience any of the following:  temperature >100.4     Notify your health care provider if you experience any of the following:  severe uncontrolled pain     Notify your health care provider if you experience any of the following:  redness, tenderness, or signs of infection (pain, swelling, redness, odor or green/yellow discharge around incision site)     Notify your health care provider if you experience any of the following:  worsening rash     Activity as tolerated       Significant Diagnostic Studies:  Imaging Results          MRI Foot (Midfoot) Left W W/O Contrast (Final result)  Result time 09/08/22 11:39:46    Final result by Israel Mabry MD (09/08/22 11:39:46)                 Impression:      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    Plantar soft tissue ulceration with prominent surrounding soft tissue inflammatory changes. No clear  evidence of osteomyelitis with the adjacent midfoot.  No fluid collections.      Electronically signed by: Israel Mabry MD  Date:    09/08/2022  Time:    11:39             Narrative:    EXAMINATION:  MRI FOOT (MIDFOOT) LEFT W W/O CONTRAST    CLINICAL HISTORY:  Foot swelling, diabetic, osteomyelitis suspected, xray done;  Cellulitis of left lower limb    TECHNIQUE:  Routine left multisequence multiplanar MRI forefoot protocol performed with the administration of 7.5 cc of Gadavist IV contrast.    COMPARISON:  05/05/2022    FINDINGS:  There is advanced destructive neuropathic arthropathy involving the joints of the midfoot.  Scattered associated marrow edema/increased T2 signal noted, likely reactive from the underlying arthropathy.  There is soft tissue ulceration noted along the plantar aspect of the midfoot with prominent surrounding soft tissue inflammatory changes.  No adjacent osteomyelitis identified contiguous with this soft tissue inflammation.  No fluid collections identified.  Muscle atrophy noted.                               X-Ray Foot Complete Left (Final result)  Result time 09/07/22 17:29:35    Final result by hSalom Bro MD (09/07/22 17:29:35)                 Impression:      See above.      Electronically signed by: Shalom Bro MD  Date:    09/07/2022  Time:    17:29             Narrative:    EXAMINATION:  XR FOOT COMPLETE 3 VIEW LEFT    CLINICAL HISTORY:  .  Type 2 diabetes mellitus with foot ulcer    TECHNIQUE:  AP, lateral and oblique views of the left foot were performed.    COMPARISON:  04/02/2022.    FINDINGS:  Severe advanced degenerative changes are seen throughout the midfoot with chronic fragmentation which may relate to Charcot foot.  There is chronic Lisfranc deformity seen with lateral dislocation of the 2nd metatarsal in relation to the middle cuneiform.  No acute displaced fracture or dislocation seen.  Osteopenic changes are seen.  Prominent soft tissue swelling is seen over  the dorsum of the foot.  Ulceration is seen at the plantar aspect of the midfoot.  Overall no significant change in the appearance of the foot from prior radiographs from 04/20/2022.                                  Pending Diagnostic Studies:     None         Medications:  Reconciled Home Medications:      Medication List      START taking these medications    cefadroxil 500 MG Cap  Commonly known as: DURICEF  Take 2 capsules (1 g total) by mouth every 12 (twelve) hours. for 14 days     HYDROcodone-acetaminophen  mg per tablet  Commonly known as: NORCO  Take 1 tablet by mouth every 12 (twelve) hours as needed for Pain.        CONTINUE taking these medications    acetaminophen 500 MG tablet  Commonly known as: TYLENOL  Take 2 tablets (1,000 mg total) by mouth every 6 (six) hours as needed for Pain.     albuterol-ipratropium 2.5 mg-0.5 mg/3 mL nebulizer solution  Commonly known as: DUO-NEB  Take 3 mLs by nebulization every 6 (six) hours as needed for Wheezing or Shortness of Breath. Rescue     ammonium lactate 12 % lotion  Commonly known as: LAC-HYDRIN  APPL Y ONCE TOPICALLY TWICE DAILY FOR 30 DAYS     aspirin 81 MG Chew  Take 1 tablet (81 mg total) by mouth once daily.     blood sugar diagnostic Strp  To check BG one time daily, to use with insurance preferred meter     blood-glucose meter kit  To check BG one time daily, to use with insurance preferred meter     bumetanide 1 MG tablet  Commonly known as: BUMEX  Take 1 tablet (1 mg total) by mouth once daily.     carBAMazepine 200 mg tablet  Commonly known as: TEGRETOL  Take 400 mg by mouth 2 (two) times daily.     cetirizine 10 MG tablet  Commonly known as: ZYRTEC  Take 1 tablet (10 mg total) by mouth once daily. for 10 days     * divalproex 250 MG EC tablet  Commonly known as: DEPAKOTE  Take 5 tablets (1,250 mg total) by mouth every evening.     * divalproex 500 MG Tbec  Commonly known as: DEPAKOTE  Take 1 tablet (500 mg total) by mouth once daily. PO QAM      DUPIXENT  mg/2 mL Pnij  Generic drug: dupilumab  Inject into the skin.     ELIQUIS 5 mg Tab  Generic drug: apixaban  Take 1 tablet (5 mg total) by mouth 2 (two) times daily.     fluticasone propionate 50 mcg/actuation nasal spray  Commonly known as: FLONASE  2 sprays (100 mcg total) by Each Nostril route daily as needed (Nasal congestion).     fluticasone-salmeterol 250-50 mcg/dose 250-50 mcg/dose diskus inhaler  Commonly known as: ADVAIR  Inhale 1 puff into the lungs 2 (two) times daily. Controller     gabapentin 300 MG capsule  Commonly known as: NEURONTIN  Take 2 capsules (600 mg total) by mouth 2 (two) times daily.     hydrOXYzine 50 MG tablet  Commonly known as: ATARAX  Take 50 mg by mouth 4 (four) times daily as needed.     lancets Misc  To check BG one time daily, to use with insurance preferred meter     lisinopriL 10 MG tablet  Take 1 tablet (10 mg total) by mouth once daily.     loratadine 10 mg tablet  Commonly known as: CLARITIN  Take 1 tablet (10 mg total) by mouth once daily.     metFORMIN 1000 MG tablet  Commonly known as: GLUCOPHAGE  Take 1 tablet (1,000 mg total) by mouth 2 (two) times daily with meals.     metoprolol tartrate 50 MG tablet  Commonly known as: LOPRESSOR  TAKE 1 TABLET(50 MG) BY MOUTH TWICE DAILY     miconazole nitrate (bulk) Powd  Apply to clean dry skin twice daily     multivitamin Tab  Take 1 tablet by mouth once daily.     OLANZapine 10 MG tablet  Commonly known as: ZyPREXA  Take 1 tablet (10 mg total) by mouth every 8 (eight) hours as needed (Agitation).     pantoprazole 40 MG tablet  Commonly known as: PROTONIX  Take 1 tablet (40 mg total) by mouth once daily.     potassium chloride 10 MEQ Cpsr  Commonly known as: MICRO-K  Take 1 capsule (10 mEq total) by mouth once daily.     pravastatin 40 MG tablet  Commonly known as: PRAVACHOL  Take 1 tablet (40 mg total) by mouth every evening.     risperiDONE 3 MG disintegrating tablet  Commonly known as: RISPERDAL M-TABS  Take 1  tablet (3 mg total) by mouth 2 (two) times daily.         * This list has 2 medication(s) that are the same as other medications prescribed for you. Read the directions carefully, and ask your doctor or other care provider to review them with you.            STOP taking these medications    albuterol 90 mcg/actuation inhaler  Commonly known as: PROVENTIL/VENTOLIN HFA     diclofenac sodium 1 % Gel  Commonly known as: VOLTAREN     doxycycline 100 MG tablet  Commonly known as: VIBRA-TABS     furosemide 20 MG tablet  Commonly known as: LASIX     nystatin powder  Commonly known as: NYSTOP     QUEtiapine 200 MG Tab  Commonly known as: SEROQUEL            Indwelling Lines/Drains at time of discharge:   Lines/Drains/Airways     None                 Time spent on the discharge of patient: Greater than 35 minutes         Velvet Galicia DO  Department of Hospital Medicine  [facility] [place] - Med Surg

## 2022-09-09 NOTE — ASSESSMENT & PLAN NOTE
"52M with h/o DM with charcot foot, DVT on Eliquis, PAD, tobacco abuse admitted 9/7 with worsening L foot wound. No systemic signs of infection. JEYSON with  hemodynamically significant stenosis in the left and DPA. Multifocal mild to moderate grade stenoses is suspected throughout the left MIRACLE and, bilateral posterior tibial and peroneal arteris. L foot wound swab- group G strep and proteus. MRI -No clear evidence of osteomyelitis with the adjacent midfoot.  No fluid collections. ID consulted for "Per podiatry for further evaluation of long term abx therapy with wound infection" on vanc only    Note pt with PCN allergy, but has tolerated keflex and ancef    Recommendations:   - cefadroxil 500mg po bid x 14 days (est end date 9/20)  - outpatient referral to allergy/immunology for PCN allergy testing  - wound care as per podiatry  - please ensure she has adequate perfusion to heal wound    Discussed with hospitalist       "

## 2022-09-09 NOTE — SUBJECTIVE & OBJECTIVE
"Interval history: NAEO. Wants to go home    Past Surgical History:   Procedure Laterality Date    ABDOMINAL SURGERY  2010    gastric sleeve    BILATERAL OOPHORECTOMY Bilateral 1/12/2015    CHOLECYSTECTOMY      DEBRIDEMENT OF FOOT Bilateral 5/10/2022    Procedure: DEBRIDEMENT, FOOT;  Surgeon: Maira De Los Santos DPM;  Location: [facility] OR;  Service: Podiatry;  Laterality: Bilateral;    Green' s filter Right 7/4/2012    Right Neck & Tunneled Down.    HERNIA REPAIR      "[place] [employer] around th [place].", pt. states    LAPAROSCOPIC CHOLECYSTECTOMY N/A 9/10/2020    Procedure: CHOLECYSTECTOMY, LAPAROSCOPIC;  Surgeon: Montrell Gutierrez MD;  Location: [facility] OR;  Service: General;  Laterality: N/A;  RN PREOP 9/9----COVID Negative  9/9    OVARIAN CYST REMOVAL  3/13/2014    VT REMOVAL OF OVARY/TUBE(S)      SPLENECTOMY, TOTAL  July 2003    TONSILLECTOMY      as a child    TYMPANOSTOMY TUBE PLACEMENT  1976    VEIN SURGERY  2003    Lt leg       Review of patient's allergies indicates:   Allergen Reactions    Morphine Other (See Comments)     Patient had a psychotic episode after taking Morphine  Agitation, hallucinations    Penicillins Anaphylaxis     itching    Januvia [sitagliptin] Hives    Carbamazepine Other (See Comments)     hyponatremia       No current facility-administered medications on file prior to encounter.     Current Outpatient Medications on File Prior to Encounter   Medication Sig    acetaminophen (TYLENOL) 500 MG tablet Take 2 tablets (1,000 mg total) by mouth every 6 (six) hours as needed for Pain.    albuterol-ipratropium (DUO-NEB) 2.5 mg-0.5 mg/3 mL nebulizer solution Take 3 mLs by nebulization every 6 (six) hours as needed for Wheezing or Shortness of Breath. Rescue    apixaban (ELIQUIS) 5 mg Tab Take 1 tablet (5 mg total) by mouth 2 (two) times daily.    blood sugar diagnostic Strp To check BG one time daily, to use with insurance preferred meter    blood-glucose meter kit To " check BG one time daily, to use with insurance preferred meter    bumetanide (BUMEX) 1 MG tablet Take 1 tablet (1 mg total) by mouth once daily.    divalproex (DEPAKOTE) 250 MG EC tablet Take 5 tablets (1,250 mg total) by mouth every evening.    divalproex (DEPAKOTE) 500 MG TbEC Take 1 tablet (500 mg total) by mouth once daily. PO QAM    fluticasone propionate (FLONASE) 50 mcg/actuation nasal spray 2 sprays (100 mcg total) by Each Nostril route daily as needed (Nasal congestion).    fluticasone-salmeterol diskus inhaler 250-50 mcg Inhale 1 puff into the lungs 2 (two) times daily. Controller    gabapentin (NEURONTIN) 300 MG capsule Take 2 capsules (600 mg total) by mouth 2 (two) times daily.    lancets Misc To check BG one time daily, to use with insurance preferred meter    lisinopriL 10 MG tablet Take 1 tablet (10 mg total) by mouth once daily.    loratadine (CLARITIN) 10 mg tablet Take 1 tablet (10 mg total) by mouth once daily.    metFORMIN (GLUCOPHAGE) 1000 MG tablet Take 1 tablet (1,000 mg total) by mouth 2 (two) times daily with meals.    metoprolol tartrate (LOPRESSOR) 50 MG tablet TAKE 1 TABLET(50 MG) BY MOUTH TWICE DAILY    miconazole nitrate, bulk, Powd Apply to clean dry skin twice daily    multivitamin Tab Take 1 tablet by mouth once daily.    OLANZapine (ZYPREXA) 10 MG tablet Take 1 tablet (10 mg total) by mouth every 8 (eight) hours as needed (Agitation).    pantoprazole (PROTONIX) 40 MG tablet Take 1 tablet (40 mg total) by mouth once daily.    potassium chloride (MICRO-K) 10 MEQ CpSR Take 1 capsule (10 mEq total) by mouth once daily.    pravastatin (PRAVACHOL) 40 MG tablet Take 1 tablet (40 mg total) by mouth every evening.    risperiDONE (RISPERDAL M-TABS) 3 MG disintegrating tablet Take 1 tablet (3 mg total) by mouth 2 (two) times daily.    [DISCONTINUED] doxycycline (VIBRA-TABS) 100 MG tablet Take 1 tablet (100 mg total) by mouth 2 (two) times daily.    ammonium lactate  (LAC-HYDRIN) 12 % lotion APPL Y ONCE TOPICALLY TWICE DAILY FOR 30 DAYS    aspirin 81 MG Chew Take 1 tablet (81 mg total) by mouth once daily.    carBAMazepine (TEGRETOL) 200 mg tablet Take 400 mg by mouth 2 (two) times daily.    cetirizine (ZYRTEC) 10 MG tablet Take 1 tablet (10 mg total) by mouth once daily. for 10 days    DUPIXENT  mg/2 mL PnIj Inject into the skin.    hydrOXYzine (ATARAX) 50 MG tablet Take 50 mg by mouth 4 (four) times daily as needed.    [DISCONTINUED] albuterol (PROVENTIL/VENTOLIN HFA) 90 mcg/actuation inhaler Inhale 2 puffs into the lungs every 6 (six) hours as needed for Wheezing. Use with spacer  Dispense with 1 spacer    [DISCONTINUED] diclofenac sodium (VOLTAREN) 1 % Gel Apply 2 g topically 4 (four) times daily as needed (Apply to painful area up to 4 times a day as needed for pain). Apply to painful area 4 times a day as needed for pain    [DISCONTINUED] furosemide (LASIX) 20 MG tablet TAKE 1 TABLET(20 MG) BY MOUTH EVERY DAY    [DISCONTINUED] nystatin (NYSTOP) powder APPLY TOPICALLY TO AXILLA AND BREAST FOLDS TWICE DAILY    [DISCONTINUED] QUEtiapine (SEROQUEL) 200 MG Tab Take 1 tablet (200 mg total) by mouth before breakfast.     Family History       Problem Relation (Age of Onset)    Cataracts Father    Diabetes Father, Paternal Grandfather    Heart disease Father, Paternal Grandfather    Hypertension Father    No Known Problems Mother, Sister, Brother, Maternal Aunt, Maternal Uncle, Paternal Aunt, Paternal Uncle, Maternal Grandfather    Ovarian cancer Maternal Grandmother, Paternal Grandmother          Tobacco Use    Smoking status: Every Day     Packs/day: 1.00     Years: 37.00     Pack years: 37.00     Types: Cigarettes     Last attempt to quit: 2020     Years since quittin.7    Smokeless tobacco: Never    Tobacco comments:     Enrolled in the [employer] on 5/3/14 ([facility] Member ID # [id number]). Ambulatory referral to Smoking Cessation Program    Substance and Sexual Activity    Alcohol use: No     Alcohol/week: 0.0 standard drinks    Drug use: No    Sexual activity: Yes     Partners: Male     Review of Systems   Constitutional:  Negative for appetite change, chills, diaphoresis and fever.   Respiratory:  Negative for cough, shortness of breath and wheezing.    Cardiovascular:  Negative for chest pain and palpitations.   Gastrointestinal:  Negative for abdominal pain, constipation, diarrhea, nausea and vomiting.   Genitourinary:  Negative for dysuria, flank pain, frequency, hematuria and urgency.   Musculoskeletal:  Negative for arthralgias, back pain, myalgias, neck pain and neck stiffness.   Skin:  Positive for color change and wound. Negative for pallor and rash.   Neurological:  Negative for dizziness, weakness, light-headedness and headaches.   Psychiatric/Behavioral:  Negative for agitation and confusion.    Objective:     Vital Signs (Most Recent):  Temp: 98.1 °F (36.7 °C) (09/09/22 1115)  Pulse: 65 (09/09/22 1115)  Resp: 16 (09/09/22 1115)  BP: (!) 128/59 (09/09/22 1115)  SpO2: (!) 93 % (09/09/22 1115) Vital Signs (24h Range):  Temp:  [97.6 °F (36.4 °C)-98.1 °F (36.7 °C)] 98.1 °F (36.7 °C)  Pulse:  [65-83] 65  Resp:  [16-20] 16  SpO2:  [92 %-95 %] 93 %  BP: (114-162)/(56-73) 128/59     Weight: 105.8 kg (233 lb 4 oz)  Body mass index is 37.65 kg/m².    Physical Exam  Vitals and nursing note reviewed.   Constitutional:       General: She is not in acute distress.     Appearance: She is well-developed. She is obese. She is not ill-appearing, toxic-appearing or diaphoretic.   HENT:      Head: Normocephalic and atraumatic.   Eyes:      General: No scleral icterus.        Right eye: No discharge.         Left eye: No discharge.      Conjunctiva/sclera: Conjunctivae normal.   Neck:      Trachea: No tracheal deviation.   Cardiovascular:      Rate and Rhythm: Normal rate and regular rhythm.      Heart sounds: Normal heart sounds. No murmur heard.    No  gallop.   Pulmonary:      Effort: Pulmonary effort is normal. No respiratory distress.      Breath sounds: Normal breath sounds. No stridor. No wheezing or rales.   Abdominal:      General: Bowel sounds are normal. There is no distension.      Palpations: Abdomen is soft. There is no mass.      Tenderness: There is no abdominal tenderness. There is no guarding.   Musculoskeletal:         General: Deformity (Charcot's deformity of left foot) present. Normal range of motion.      Cervical back: Normal range of motion and neck supple.   Skin:     General: Skin is warm and dry.      Coloration: Skin is not pale.      Findings: Erythema (distal LLE extending into foot) present. No rash.   Neurological:      General: No focal deficit present.      Mental Status: She is alert and oriented to person, place, and time.      Cranial Nerves: No cranial nerve deficit.      Motor: No abnormal muscle tone.   Psychiatric:         Mood and Affect: Mood normal.         Behavior: Behavior normal.         Thought Content: Thought content normal.         Judgment: Judgment normal.             Significant Labs: All pertinent labs within the past 24 hours have been reviewed.  BMP:   Recent Labs   Lab 09/09/22  0336   *   *   K 4.6   CL 94*   CO2 29   BUN 11   CREATININE 0.6   CALCIUM 8.7   MG 1.7       CBC:   Recent Labs   Lab 09/07/22  1531 09/08/22  0546 09/09/22  0336   WBC 15.83* 10.81 9.42   HGB 10.0* 9.3* 9.8*   HCT 30.2* 29.1* 31.2*   * 673* 743*       CMP:   Recent Labs   Lab 09/07/22  1531 09/08/22  0544 09/09/22  0336   * 128* 132*   K 5.2* 4.5 4.6   CL 92* 92* 94*   CO2 28 28 29    107 119*   BUN 13 10 11   CREATININE 0.7 0.6 0.6   CALCIUM 9.3 8.7 8.7   PROT 7.1 6.3 6.4   ALBUMIN 3.1* 2.7* 2.7*   BILITOT 0.1 0.2 0.2   ALKPHOS 112 103 102   AST 9* 9* 8*   ALT 8* 8* 5*   ANIONGAP 9 8 9       Urine Culture: No results for input(s): LABURIN in the last 48 hours.  Urine Studies: No results for  input(s): COLORU, APPEARANCEUA, PHUR, SPECGRAV, PROTEINUA, GLUCUA, KETONESU, BILIRUBINUA, OCCULTUA, NITRITE, UROBILINOGEN, LEUKOCYTESUR, RBCUA, WBCUA, BACTERIA, SQUAMEPITHEL, HYALINECASTS in the last 48 hours.    Invalid input(s): WRIGHTSUR    Significant Imaging: I have reviewed all pertinent imaging results/findings within the past 24 hours.  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    Plantar soft tissue ulceration with prominent surrounding soft tissue inflammatory changes. No clear evidence of osteomyelitis with the adjacent midfoot.  No fluid collections.      Electronically signed by: Israel Mabry MD  Date:    09/08/2022  Time:    11:39               Narrative:    EXAMINATION:  MRI FOOT (MIDFOOT) LEFT W W/O CONTRAST    CLINICAL HISTORY:  Foot swelling, diabetic, osteomyelitis suspected, xray done;  Cellulitis of left lower limb    TECHNIQUE:  Routine left multisequence multiplanar MRI forefoot protocol performed with the administration of 7.5 cc of Gadavist IV contrast.    COMPARISON:  05/05/2022    FINDINGS:  There is advanced destructive neuropathic arthropathy involving the joints of the midfoot.  Scattered associated marrow edema/increased T2 signal noted, likely reactive from the underlying arthropathy.  There is soft tissue ulceration noted along the plantar aspect of the midfoot with prominent surrounding soft tissue inflammatory changes.  No adjacent osteomyelitis identified contiguous with this soft tissue inflammation.  No fluid collections identified.  Muscle atrophy noted.                                       X-Ray Foot Complete Left (Final result)  Result time 09/07/22 17:29:35      Final result by Shalom Bro MD (09/07/22 17:29:35)                    Impression:      See above.      Electronically signed by: Shalom Bro MD  Date:    09/07/2022  Time:    17:29               Narrative:    EXAMINATION:  XR FOOT COMPLETE 3 VIEW LEFT    CLINICAL HISTORY:  .  Type 2 diabetes mellitus with foot ulcer    TECHNIQUE:  AP, lateral and oblique views of the left foot were performed.    COMPARISON:  04/02/2022.    FINDINGS:  Severe advanced degenerative changes are seen throughout the midfoot with chronic fragmentation which may relate to Charcot foot.  There is chronic Lisfranc deformity seen with lateral dislocation of the 2nd metatarsal in relation to the middle cuneiform.  No acute displaced fracture or dislocation seen.  Osteopenic changes are seen.  Prominent soft tissue swelling is seen over the dorsum of the foot.  Ulceration is seen at the plantar aspect of the midfoot.  Overall no significant change in the appearance of the foot from prior radiographs from 04/20/2022.

## 2022-09-09 NOTE — TELEPHONE ENCOUNTER
Called the pt three times phone going straight to , second number on file is not a working number. Tried the sister number on file , no answer. Will mail out appt letter.    Lorena SHIPMAN

## 2022-09-10 NOTE — PROGRESS NOTES
[facility] S[facility]  Podiatry  Progress Note    Patient Name: Audrey Natarajan  MRN: [id number]  Admission Date: 9/7/2022  Hospital Length of Stay: 2 days  Attending Physician: No att. providers found  Primary Care Provider: Donaldo Pena MD     Subjective:         History of Present Illness: Audrey Natarajan is a 52 y.o. female with  has a past medical history of ADHD (attention deficit hyperactivity disorder), Arthritis, Asthma, Bipolar 1 disorder, Cataract, Cigarette smoker, COPD (chronic obstructive pulmonary disease), Coronary artery disease, Depression, Diabetes mellitus, Diabetic foot ulcers, Diabetic neuropathy, DVT of lower extremity, bilateral, Encounter for blood transfusion, History of blood clots, HTN (hypertension), Hypercholesteremia, Irregular heartbeat, Neuromuscular disorder, Obese, PE (pulmonary embolism), and Restless leg syndrome.  Consulted to Podiatry for evaluation and treatment of left foot ulcer.   Location:  Left plantar midfoot patient is familiar to me and to our service the aforementioned wound is chronic in nature and has been treated for osteomyelitis following hospital admission in May 2022.  Following hospital discharge patient has been being [facility] weekly.  Patient relates that for the last 3-5 days she is been having increased pain and swelling to the left lower extremity.  She relates that the pain and swelling was so severe that she cut her dressing in order to try to relieve some of the pressure.  She denies any excess ambulation or change in activity.  She denies any trauma to the area.  She denies getting the foot wet.  She relates the only change that she now has a vehicle but states that her 1st time driving it without her supportive boot was today.  She was taking antibiotics prescribed on Friday as instructed.  She was asked to present to the emergency department from the [facility] following her weekly nurse visit.      Interval History: 09/08/2022 patient seen at bedside resting.  She relates improvement with pain, swelling, redness.  No new pedal complaints.    9/9/22: Patient seen bedside. Plan for discharge today.     Scheduled Meds:  REM    Continuous Infusions:  REM    PRN Meds:REM    Review of Systems   Constitutional:  Positive for activity change and fatigue. Negative for appetite change, chills and fever.   Respiratory:  Negative for cough and shortness of breath.    Cardiovascular:  Positive for leg swelling. Negative for chest pain.   Gastrointestinal:  Negative for diarrhea, nausea and vomiting.   Musculoskeletal:  Positive for arthralgias and myalgias.   Skin:  Positive for color change and wound.   Neurological:  Positive for numbness. Negative for weakness.        + paresthesia    Objective:     Vital Signs (Most Recent):  Temp: 98.1 °F (36.7 °C) (09/09/22 1115)  Pulse: 65 (09/09/22 1115)  Resp: 16 (09/09/22 1115)  BP: (!) 128/59 (09/09/22 1115)  SpO2: (!) 93 % (09/09/22 1115)   Vital Signs (24h Range):  Temp:  [98.1 °F (36.7 °C)] 98.1 °F (36.7 °C)  Pulse:  [65-82] 65  Resp:  [16-17] 16  SpO2:  [93 %-95 %] 93 %  BP: (128-162)/(59-73) 128/59     Weight: 105.8 kg (233 lb 4 oz)  Body mass index is 37.65 kg/m².    Physical Exam  Nursing note reviewed.   Constitutional:       General: She is not in acute distress.     Appearance: She is not toxic-appearing or diaphoretic.   Cardiovascular:      Pulses:           Dorsalis pedis pulses are 1+ on the right side and 1+ on the left side.        Posterior tibial pulses are 2+ on the right side and 2+ on the left side.   Pulmonary:      Effort: No respiratory distress.   Musculoskeletal:      Right lower leg: Edema present.      Left lower leg: Edema present.      Right ankle: Swelling present. No lateral malleolus, medial malleolus, AITF ligament, CF ligament or posterior TF ligament tenderness. Decreased range of motion.      Right Achilles Tendon: No defects. Paniagua's  test negative.      Left ankle: Swelling present. No lateral malleolus, medial malleolus, AITF ligament, CF ligament, posterior TF ligament or proximal fibula tenderness. Decreased range of motion.      Left Achilles Tendon: No defects. Paniagua's test negative.      Right foot: Swelling  present.      Left foot: Swelling, Charcot foot and tenderness present.      Comments: There is equinus deformity bilateral with decreased dorsiflexion at the ankle joint bilateral     Skin:     General: Skin is warm and dry.      Coloration: Skin is not pale.      Findings: Erythema and wound (see below) present.       Nails: There is no clubbing.   Neurological:      Sensory: No sensory deficit.      Motor: No tremor, atrophy or abnormal muscle tone.      Deep Tendon Reflexes: Reflexes are normal and symmetric.      Comments: Paresthesias, and hyperesthesia bilateral feet at toes with no clearly identified trigger or source.    Pioneer-Gilberto 5.07 monofilament is intact bilateral feet. Sharp/dull sensation is also intact Bilateral feet.   Psychiatric:         Attention and Perception: She is attentive.         Mood and Affect: Mood is not anxious. Affect is not inappropriate.         Speech: She is communicative. Speech is not slurred.         Behavior: Behavior is not combative.     9/9/22 09/08/2022  Ulcer location:  Left plantar medial midfoot   Measurements :  3.1 x 2.7 x 0.7  cm   Signs of infection:  Local edema and erythema   Drainage: Sero-Sanguinous  Purulence: no  Crepitus/fluctuance: no  Periwound: Reddened, Macerated, Calloused  Base: Mixed Granular/Fibrotic  Depth: Bone at the proximal lateral border  Probe to bone: yes          09/07/2022  Ulcer location:  Left plantar medial midfoot   Measurements :  3.1 x 2.7 x 0.7  cm   Signs of infection:  Local edema and erythema, pain with palpation   Drainage: Sero-Sanguinous  Purulence: no  Crepitus/fluctuance: no  Periwound: Reddened, Macerated, Calloused  Base:  Mixed Granular/Fibrotic  Depth: Bone at the proximal lateral border  Probe to bone: yes        Laboratory:  CBC:   Recent Labs   Lab 09/09/22  0336   WBC 9.42   RBC 4.21   HGB 9.8*   HCT 31.2*   *   MCV 74*   MCH 23.3*   MCHC 31.4*       CMP:   Recent Labs   Lab 09/09/22  0336   *   CALCIUM 8.7   ALBUMIN 2.7*   PROT 6.4   *   K 4.6   CO2 29   CL 94*   BUN 11   CREATININE 0.6   ALKPHOS 102   ALT 5*   AST 8*   BILITOT 0.2       CRP:   Recent Labs   Lab 09/07/22  1531   CRP 33.3*       ESR:   Recent Labs   Lab 09/07/22  1531   SEDRATE 37*       Microbiology Results (last 7 days)       Procedure Component Value Units Date/Time    Aerobic culture (Specify Source) **CANNOT BE ORDERED AS STAT** [[id number]]  (Abnormal)  (Susceptibility) Collected: 09/07/22 1707    Order Status: Completed Specimen: Wound from Foot, Left Updated: 09/09/22 0847     Aerobic Bacterial Culture STREPTOCOCCUS GROUP G  Many  Beta-hemolytic streptococci are routinely susceptible to   penicillins,cephalosporins and carbapenems.        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    Plantar soft tissue ulceration with prominent surrounding soft tissue inflammatory changes. No clear evidence of osteomyelitis with the adjacent midfoot.  No fluid collections.      Electronically signed by: Israel Mabry MD  Date:    09/08/2022  Time:    11:39               Narrative:    EXAMINATION:  MRI FOOT (MIDFOOT) LEFT W W/O CONTRAST    CLINICAL HISTORY:  Foot swelling, diabetic, osteomyelitis suspected, xray done;  Cellulitis of left lower limb    TECHNIQUE:  Routine left multisequence multiplanar MRI forefoot protocol performed with the administration of 7.5 cc of  Gadavist IV contrast.    COMPARISON:  05/05/2022    FINDINGS:  There is advanced destructive neuropathic arthropathy involving the joints of the midfoot.  Scattered associated marrow edema/increased T2 signal noted, likely reactive from the underlying arthropathy.  There is soft tissue ulceration noted along the plantar aspect of the midfoot with prominent surrounding soft tissue inflammatory changes.  No adjacent osteomyelitis identified contiguous with this soft tissue inflammation.  No fluid collections identified.  Muscle atrophy noted.                                       X-Ray Foot Complete Left (Final result)  Result time 09/07/22 17:29:35      Final result by Shalom Bro MD (09/07/22 17:29:35)                   Impression:      See above.      Electronically signed by: Shalom Bro MD  Date:    09/07/2022  Time:    17:29               Narrative:    EXAMINATION:  XR FOOT COMPLETE 3 VIEW LEFT    CLINICAL HISTORY:  .  Type 2 diabetes mellitus with foot ulcer    TECHNIQUE:  AP, lateral and oblique views of the left foot were performed.    COMPARISON:  04/02/2022.    FINDINGS:  Severe advanced degenerative changes are seen throughout the midfoot with chronic fragmentation which may relate to Charcot foot.  There is chronic Lisfranc deformity seen with lateral dislocation of the 2nd metatarsal in relation to the middle cuneiform.  No acute displaced fracture or dislocation seen.  Osteopenic changes are seen.  Prominent soft tissue swelling is seen over the dorsum of the foot.  Ulceration is seen at the plantar aspect of the midfoot.  Overall no significant change in the appearance of the foot from prior radiographs from 04/20/2022.                                      Assessment/Plan:     Active Diagnoses:    Diagnosis Date Noted POA    PRINCIPAL PROBLEM:  Cellulitis of left lower extremity [L03.116] 07/16/2016 Yes    Open wound of left foot [S91.302A] 09/08/2022 Yes    Charcot's joint of left foot  [M14.672]  Yes    Iron deficiency anemia [D50.9] 05/06/2022 Yes    PAD (peripheral artery disease) [I73.9] 05/06/2022 Yes    Chronic deep vein thrombosis (DVT) of both lower extremities [I82.503] 04/13/2022 Yes    Type 2 diabetes mellitus [E11.9] 09/26/2016 Yes    Thrombocytosis [D75.839] 07/16/2016 Yes    Bipolar 1 disorder [F31.9] 04/13/2015 Yes     Chronic    Tobacco abuse [Z72.0] 03/06/2014 Yes     Chronic    Hyperlipidemia [E78.5] 02/13/2014 Yes    COPD (chronic obstructive pulmonary disease) [J44.9] 10/11/2013 Yes     Chronic    Essential hypertension [I10] 06/06/2013 Yes     Chronic      Problems Resolved During this Admission:         Greater than 50% of this visit spent on counseling and coordination of care. The importance of tight glycemic control, adequate vitamin supplementation, protein intake, and hydration - discussed with patient    All labs and current imaging reviewed.  I am concerned with the increased depth that occurred in such a short period of time.  I am now able to palpate bone to the proximal lateral aspect of her wound which previously did not have a draining sinus nor tract    No evidence of osteomyelitis seen on MRI.     Plan for culture based antibiotic powder to be ordered and sent the patient home as well as supplies to be sent to the patient home so that she can do dressing changes between wound clinic visits.    Discharge antibiotic plan per ID    We will continue to follow and work in partnership with treatment team on how to best treat patient concern and diagnosis.    Thank you for your consult. I will follow-up with patient. Please contact us if you have any additional questions.    Football dressing applied.       Halle Gill DPM  Podiatry  [facility] - [place], [place] - Med Surg

## 2022-09-12 ENCOUNTER — HOSPITAL ENCOUNTER (EMERGENCY)
Facility: HOSPITAL | Age: 52
Discharge: HOME | End: 2022-09-13
Attending: STUDENT IN AN ORGANIZED HEALTH CARE EDUCATION/TRAINING PROGRAM
Payer: MEDICAID

## 2022-09-12 ENCOUNTER — TELEPHONE (OUTPATIENT)
Dept: WOUND CARE | Facility: HOSPITAL | Age: 52
End: 2022-09-12
Payer: MEDICAID

## 2022-09-12 ENCOUNTER — PATIENT OUTREACH (OUTPATIENT)
Dept: ADMINISTRATIVE | Facility: CLINIC | Age: 52
End: 2022-09-12
Payer: MEDICAID

## 2022-09-12 VITALS
WEIGHT: 230 LBS | HEART RATE: 84 BPM | DIASTOLIC BLOOD PRESSURE: 68 MMHG | OXYGEN SATURATION: 99 % | TEMPERATURE: 98 F | RESPIRATION RATE: 18 BRPM | SYSTOLIC BLOOD PRESSURE: 152 MMHG | BODY MASS INDEX: 36.96 KG/M2 | HEIGHT: 66 IN

## 2022-09-12 DIAGNOSIS — M79.89 LEG SWELLING: ICD-10-CM

## 2022-09-12 DIAGNOSIS — R60.0 LEG EDEMA: ICD-10-CM

## 2022-09-12 LAB
ALBUMIN SERPL BCP-MCNC: 3.1 G/DL (ref 3.5–5.2)
ALP SERPL-CCNC: 104 U/L (ref 55–135)
ALT SERPL W/O P-5'-P-CCNC: 9 U/L (ref 10–44)
ANION GAP SERPL CALC-SCNC: 12 MMOL/L (ref 8–16)
AST SERPL-CCNC: 5 U/L (ref 10–40)
BASOPHILS # BLD AUTO: 0.09 K/UL (ref 0–0.2)
BASOPHILS NFR BLD: 0.8 % (ref 0–1.9)
BILIRUB SERPL-MCNC: 0.1 MG/DL (ref 0.1–1)
BNP SERPL-MCNC: 16 PG/ML (ref 0–99)
BUN SERPL-MCNC: 8 MG/DL (ref 6–20)
CALCIUM SERPL-MCNC: 9 MG/DL (ref 8.7–10.5)
CHLORIDE SERPL-SCNC: 91 MMOL/L (ref 95–110)
CO2 SERPL-SCNC: 26 MMOL/L (ref 23–29)
CREAT SERPL-MCNC: 0.6 MG/DL (ref 0.5–1.4)
DIFFERENTIAL METHOD: ABNORMAL
EOSINOPHIL # BLD AUTO: 0.5 K/UL (ref 0–0.5)
EOSINOPHIL NFR BLD: 4.3 % (ref 0–8)
ERYTHROCYTE [DISTWIDTH] IN BLOOD BY AUTOMATED COUNT: 18.4 % (ref 11.5–14.5)
EST. GFR  (NO RACE VARIABLE): >60 ML/MIN/1.73 M^2
GLUCOSE SERPL-MCNC: 175 MG/DL (ref 70–110)
HCT VFR BLD AUTO: 28.2 % (ref 37–48.5)
HGB BLD-MCNC: 9.6 G/DL (ref 12–16)
IMM GRANULOCYTES # BLD AUTO: 0.11 K/UL (ref 0–0.04)
IMM GRANULOCYTES NFR BLD AUTO: 0.9 % (ref 0–0.5)
LYMPHOCYTES # BLD AUTO: 5.3 K/UL (ref 1–4.8)
LYMPHOCYTES NFR BLD: 44.4 % (ref 18–48)
MCH RBC QN AUTO: 24.7 PG (ref 27–31)
MCHC RBC AUTO-ENTMCNC: 34 G/DL (ref 32–36)
MCV RBC AUTO: 73 FL (ref 82–98)
MONOCYTES # BLD AUTO: 1.4 K/UL (ref 0.3–1)
MONOCYTES NFR BLD: 12 % (ref 4–15)
NEUTROPHILS # BLD AUTO: 4.5 K/UL (ref 1.8–7.7)
NEUTROPHILS NFR BLD: 37.6 % (ref 38–73)
NRBC BLD-RTO: 0 /100 WBC
PLATELET # BLD AUTO: 651 K/UL (ref 150–450)
PMV BLD AUTO: 8.5 FL (ref 9.2–12.9)
POTASSIUM SERPL-SCNC: 4.3 MMOL/L (ref 3.5–5.1)
PROT SERPL-MCNC: 6.8 G/DL (ref 6–8.4)
RBC # BLD AUTO: 3.89 M/UL (ref 4–5.4)
SODIUM SERPL-SCNC: 129 MMOL/L (ref 136–145)
TROPONIN I SERPL DL<=0.01 NG/ML-MCNC: <0.006 NG/ML (ref 0–0.03)
WBC # BLD AUTO: 11.95 K/UL (ref 3.9–12.7)

## 2022-09-12 PROCEDURE — 84484 ASSAY OF TROPONIN QUANT: CPT | Performed by: PHYSICIAN ASSISTANT

## 2022-09-12 PROCEDURE — 99285 EMERGENCY DEPT VISIT HI MDM: CPT | Mod: 25

## 2022-09-12 PROCEDURE — 83880 ASSAY OF NATRIURETIC PEPTIDE: CPT | Performed by: PHYSICIAN ASSISTANT

## 2022-09-12 PROCEDURE — 25000003 PHARM REV CODE 250: Performed by: STUDENT IN AN ORGANIZED HEALTH CARE EDUCATION/TRAINING PROGRAM

## 2022-09-12 PROCEDURE — 85025 COMPLETE CBC W/AUTO DIFF WBC: CPT | Performed by: PHYSICIAN ASSISTANT

## 2022-09-12 PROCEDURE — 80053 COMPREHEN METABOLIC PANEL: CPT | Performed by: PHYSICIAN ASSISTANT

## 2022-09-12 PROCEDURE — 93005 ELECTROCARDIOGRAM TRACING: CPT

## 2022-09-12 PROCEDURE — 93010 EKG 12-LEAD: ICD-10-PCS | Mod: ,,, | Performed by: INTERNAL MEDICINE

## 2022-09-12 PROCEDURE — 93010 ELECTROCARDIOGRAM REPORT: CPT | Mod: ,,, | Performed by: INTERNAL MEDICINE

## 2022-09-12 RX ORDER — OXYCODONE AND ACETAMINOPHEN 10; 325 MG/1; MG/1
1 TABLET ORAL
Status: COMPLETED | OUTPATIENT
Start: 2022-09-12 | End: 2022-09-12

## 2022-09-12 RX ADMIN — OXYCODONE AND ACETAMINOPHEN 1 TABLET: 10; 325 TABLET ORAL at 09:09

## 2022-09-12 NOTE — PROGRESS NOTES
OTC B12, Zinc, Calicum, B6 , B Complex    C3 nurse spoke with Audrey Natarajan  for a TCC post hospital discharge follow up call. The patient does not have a scheduled HOSFU appointment with Donaldo Pena MD  within 5-7 days post hospital discharge date 09/09/2022. C3 nurse was unable to schedule HOSFU appointment in [facility].    Please contact patient and schedule follow up appointment using HOSFU visit type on or before 09/16/2022.    Message sent to PCP staff. Out of NP area.

## 2022-09-13 ENCOUNTER — OFFICE VISIT (OUTPATIENT)
Dept: PODIATRY | Facility: CLINIC | Age: 52
End: 2022-09-13
Payer: MEDICAID

## 2022-09-13 VITALS — HEIGHT: 66 IN | BODY MASS INDEX: 36.96 KG/M2 | WEIGHT: 230 LBS

## 2022-09-13 DIAGNOSIS — M79.672 FOOT PAIN, LEFT: ICD-10-CM

## 2022-09-13 DIAGNOSIS — E11.49 TYPE II DIABETES MELLITUS WITH NEUROLOGICAL MANIFESTATIONS: ICD-10-CM

## 2022-09-13 DIAGNOSIS — Z86.718 HISTORY OF DVT (DEEP VEIN THROMBOSIS): ICD-10-CM

## 2022-09-13 DIAGNOSIS — M14.672 CHARCOT'S JOINT OF LEFT FOOT: ICD-10-CM

## 2022-09-13 DIAGNOSIS — L97.423 LEFT MIDFOOT ULCER, WITH NECROSIS OF MUSCLE: Primary | ICD-10-CM

## 2022-09-13 PROCEDURE — 1111F DSCHRG MED/CURRENT MED MERGE: CPT | Mod: CPTII,,, | Performed by: PODIATRIST

## 2022-09-13 PROCEDURE — 1111F PR DISCHARGE MEDS RECONCILED W/ CURRENT OUTPATIENT MED LIST: ICD-10-PCS | Mod: CPTII,,, | Performed by: PODIATRIST

## 2022-09-13 PROCEDURE — 4010F PR ACE/ARB THEARPY RXD/TAKEN: ICD-10-PCS | Mod: CPTII,,, | Performed by: PODIATRIST

## 2022-09-13 PROCEDURE — 99999 PR PBB SHADOW E&M-EST. PATIENT-LVL V: ICD-10-PCS | Mod: PBBFAC,,, | Performed by: PODIATRIST

## 2022-09-13 PROCEDURE — 99999 PR PBB SHADOW E&M-EST. PATIENT-LVL V: CPT | Mod: PBBFAC,,, | Performed by: PODIATRIST

## 2022-09-13 PROCEDURE — 3046F HEMOGLOBIN A1C LEVEL >9.0%: CPT | Mod: CPTII,,, | Performed by: PODIATRIST

## 2022-09-13 PROCEDURE — 1159F PR MEDICATION LIST DOCUMENTED IN MEDICAL RECORD: ICD-10-PCS | Mod: CPTII,,, | Performed by: PODIATRIST

## 2022-09-13 PROCEDURE — 3008F BODY MASS INDEX DOCD: CPT | Mod: CPTII,,, | Performed by: PODIATRIST

## 2022-09-13 PROCEDURE — 3046F PR MOST RECENT HEMOGLOBIN A1C LEVEL > 9.0%: ICD-10-PCS | Mod: CPTII,,, | Performed by: PODIATRIST

## 2022-09-13 PROCEDURE — 99215 OFFICE O/P EST HI 40 MIN: CPT | Mod: PBBFAC,PO | Performed by: PODIATRIST

## 2022-09-13 PROCEDURE — 99214 PR OFFICE/OUTPT VISIT, EST, LEVL IV, 30-39 MIN: ICD-10-PCS | Mod: S$PBB,,, | Performed by: PODIATRIST

## 2022-09-13 PROCEDURE — 3008F PR BODY MASS INDEX (BMI) DOCUMENTED: ICD-10-PCS | Mod: CPTII,,, | Performed by: PODIATRIST

## 2022-09-13 PROCEDURE — 1160F RVW MEDS BY RX/DR IN RCRD: CPT | Mod: CPTII,,, | Performed by: PODIATRIST

## 2022-09-13 PROCEDURE — 99214 OFFICE O/P EST MOD 30 MIN: CPT | Mod: S$PBB,,, | Performed by: PODIATRIST

## 2022-09-13 PROCEDURE — 1160F PR REVIEW ALL MEDS BY PRESCRIBER/CLIN PHARMACIST DOCUMENTED: ICD-10-PCS | Mod: CPTII,,, | Performed by: PODIATRIST

## 2022-09-13 PROCEDURE — 4010F ACE/ARB THERAPY RXD/TAKEN: CPT | Mod: CPTII,,, | Performed by: PODIATRIST

## 2022-09-13 PROCEDURE — 1159F MED LIST DOCD IN RCRD: CPT | Mod: CPTII,,, | Performed by: PODIATRIST

## 2022-09-13 RX ORDER — HYDROCODONE BITARTRATE AND ACETAMINOPHEN 10; 325 MG/1; MG/1
1 TABLET ORAL EVERY 6 HOURS PRN
Qty: 25 TABLET | Refills: 0 | Status: SHIPPED | OUTPATIENT
Start: 2022-09-13 | End: 2023-01-04 | Stop reason: ALTCHOICE

## 2022-09-13 NOTE — ED NOTES
Pt, ax4, states legs hurt lower bilateral. Right foot wrapped , boot bilateral. Verbalize pain 7/10

## 2022-09-13 NOTE — PROGRESS NOTES
[facility] [place] Podiatry  Progress Note    Patient Name: Audrey Natarajan  MRN: [id number]  Primary Care Provider: Donaldo Pena MD     Subjective:       History of Present Illness: Audrey Natarajan is a 52 y.o. female with  has a past medical history of ADHD (attention deficit hyperactivity disorder), Arthritis, Asthma, Bipolar 1 disorder, Cataract, Cigarette smoker, COPD (chronic obstructive pulmonary disease), Coronary artery disease, Depression, Diabetes mellitus, Diabetic foot ulcers, Diabetic neuropathy, DVT of lower extremity, bilateral, Encounter for blood transfusion, History of blood clots, HTN (hypertension), Hypercholesteremia, Irregular heartbeat, Neuromuscular disorder, Obese, PE (pulmonary embolism), and Restless leg syndrome.  Consulted to Podiatry for evaluation and treatment of left foot ulcer.   Location:  Left plantar midfoot patient is familiar to me and to our service the aforementioned wound is chronic in nature and has been treated for osteomyelitis following hospital admission in May 2022.  Following hospital discharge patient has been being seen in the [facility] twice weekly.  Patient relates that for the last 3-5 days she is been having increased pain and swelling to the left lower extremity.  She relates that the pain and swelling was so severe that she cut her dressing in order to try to relieve some of the pressure.  She denies any excess ambulation or change in activity.  She denies any trauma to the area.  She denies getting the foot wet.  She relates the only change that she now has a vehicle but states that her 1st time driving it without her supportive boot was today.  She was taking antibiotics prescribed on Friday as instructed.  She was asked to present to the emergency department from the [facility] following her weekly nurse visit.     Interval History: 09/08/2022 patient seen at bedside resting.  She relates improvement with pain, swelling, redness.  No new  "pedal complaints.    9/9/22: Patient seen bedside. Plan for discharge today.     9/13/2022 s/p hospital discharge.  Seen in podiatry clinic.  Relates she has been in so much pain and continues to have increased swelling she presented to the ED yesterday and had US.    PRN Meds:REM    Review of Systems   Constitutional:  Positive for activity change and fatigue. Negative for appetite change, chills and fever.   Respiratory:  Negative for cough and shortness of breath.    Cardiovascular:  Positive for leg swelling. Negative for chest pain.   Gastrointestinal:  Negative for diarrhea, nausea and vomiting.   Musculoskeletal:  Positive for arthralgias and myalgias.   Skin:  Positive for color change and wound.   Neurological:  Positive for numbness. Negative for weakness.        + paresthesia    Objective:     Vitals:    09/13/22 1118   Weight: 104.3 kg (230 lb)   Height: 5' 6" (1.676 m)   PainSc: 10-Worst pain ever   PainLoc: Foot       Physical Exam  Nursing note reviewed.   Constitutional:       General: She is not in acute distress.     Appearance: She is not toxic-appearing or diaphoretic.   Cardiovascular:      Pulses:           Dorsalis pedis pulses are 1+ on the right side and 1+ on the left side.        Posterior tibial pulses are 2+ on the right side and 2+ on the left side.   Pulmonary:      Effort: No respiratory distress.   Musculoskeletal:      Right lower leg: Edema present.      Left lower leg: Edema present.      Right ankle: Swelling present. No lateral malleolus, medial malleolus, AITF ligament, CF ligament or posterior TF ligament tenderness. Decreased range of motion.      Right Achilles Tendon: No defects. Paniagua's test negative.      Left ankle: Swelling present. No lateral malleolus, medial malleolus, AITF ligament, CF ligament, posterior TF ligament or proximal fibula tenderness. Decreased range of motion.      Left Achilles Tendon: No defects. Paniagua's test negative.      Right foot: Swelling "  present.      Left foot: Swelling, Charcot foot and tenderness present.      Comments: There is equinus deformity bilateral with decreased dorsiflexion at the ankle joint bilateral     Skin:     General: Skin is warm and dry.      Coloration: Skin is not pale.      Findings: Erythema and wound (see below) present.       Nails: There is no clubbing.   Neurological:      Sensory: No sensory deficit.      Motor: No tremor, atrophy or abnormal muscle tone.      Deep Tendon Reflexes: Reflexes are normal and symmetric.      Comments: Paresthesias, and hyperesthesia bilateral feet at toes with no clearly identified trigger or source.    Sheldon-Gilberto 5.07 monofilament is intact bilateral feet. Sharp/dull sensation is also intact Bilateral feet.   Psychiatric:         Attention and Perception: She is attentive.         Mood and Affect: Mood is not anxious. Affect is not inappropriate.         Speech: She is communicative. Speech is not slurred.         Behavior: Behavior is not combative.     09/13/2022  Ulcer location:  Left plantar medial midfoot  Measurements :  3.5 x 2.7 x 0.7  cm   Signs of infection:  Local edema and erythema, pain with palpation  Drainage: Sero-Sanguinous  Purulence: no  Crepitus/fluctuance: no  Periwound: Reddened, Macerated, Calloused  Base: Mixed Granular/Fibrotic  Depth: Bone at the proximal lateral border  Probe to bone: yes          9/9/22 09/08/2022  Ulcer location:  Left plantar medial midfoot   Measurements :  3.1 x 2.7 x 0.7  cm   Signs of infection:  Local edema and erythema   Drainage: Sero-Sanguinous  Purulence: no  Crepitus/fluctuance: no  Periwound: Reddened, Macerated, Calloused  Base: Mixed Granular/Fibrotic  Depth: Bone at the proximal lateral border  Probe to bone: yes          09/07/2022  Ulcer location:  Left plantar medial midfoot   Measurements :  3.1 x 2.7 x 0.7  cm   Signs of infection:  Local edema and erythema, pain with palpation   Drainage:  Sero-Sanguinous  Purulence: no  Crepitus/fluctuance: no  Periwound: Reddened, Macerated, Calloused  Base: Mixed Granular/Fibrotic  Depth: Bone at the proximal lateral border  Probe to bone: yes        Impression:     1. No evidence of deep venous thrombosis in either lower extremity noting a single posterior tibial vein is identified bilaterally, unchanged from prior ultrasound examination of 04/13/2022.  2. Multiple enlarged inguinal lymph nodes.  This is of uncertain etiology/clinical significance.  Clinical correlation and further evaluation as warranted.        Electronically signed by: Alla Russell MD  Date:                                            09/12/2022  Time:                                           23:17    Assessment/Plan:     Problem List Items Addressed This Visit       History of DVT (deep vein thrombosis) (Chronic)    Relevant Orders    WHEELCHAIR FOR HOME USE    Left midfoot ulcer, with necrosis of muscle - Primary    Relevant Orders    WHEELCHAIR FOR HOME USE    Charcot's joint of left foot    Relevant Orders    WHEELCHAIR FOR HOME USE     Other Visit Diagnoses       Type II diabetes mellitus with neurological manifestations        Foot pain, left        Relevant Orders    WHEELCHAIR FOR HOME USE          Orders Placed This Encounter   Procedures    WHEELCHAIR FOR HOME USE         Greater than 50% of this visit spent on counseling and coordination of care. The importance of tight glycemic control, adequate vitamin supplementation, protein intake, and hydration - discussed with patient    All labs and current imaging reviewed.  I am concerned with the increased depth that occurred in such a short period of time.  I am now able to palpate bone to the proximal lateral aspect of her wound which previously did not have a draining sinus nor tract.  Rx wheelchair to assist with offloading charcot foot.  She continues to wear a Darco shoe which is not offloading deformity due to subjective pain and  swelling in CAM boot. Rx for pain    No evidence of osteomyelitis seen on MRI.    No DVT seen on US taken in ED however may possibly have lymphedema.  Patient was previously under care of vascular surgery for LE edema. Staff will assist with follow up    Signed and faxed rx for culture based antibiotic powder to sent the patient home as well as supplies to be sent to the patient home so that she can do dressing changes between wound clinic visits. She will have nursing visit at wound clinic this week and next week. Orders for shani to wound bed, gentian violet ton wound.  Football with light tubigrip (no coban)    Discharge antibiotic plan per ID    Breathable football dressing applied in office.       Maira De Los Santos DPM  Podiatry

## 2022-09-13 NOTE — ED PROVIDER NOTES
Encounter Date: 9/12/2022       History     Chief Complaint   Patient presents with    Leg Swelling     Pt states that she had wound care on Wednesday and was admitted for wound care, she was discharged Friday. Pt now has severe bilateral leg swelling but the L foot where the wound is, is worse. Pt states she need more pain pills.      52 year old female with history of chronic lower extremity edema, poorly healing left heal wound presents for ongoing, worsening lower extremity swelling, left greater than right, associated with throbbing left foot pain, relieved somewhat with elevation of her legs. She was admitted last week for complications of her chronic wounds, treated and discharged with cefadroxil, and she reports compliance. She reports that she ran out of percocet this morning and the pain has since worsened. Denies new fever or chills. Denies SOB, cough.     Review of patient's allergies indicates:   Allergen Reactions    Morphine Other (See Comments)     Patient had a psychotic episode after taking Morphine  Agitation, hallucinations    Penicillins Anaphylaxis     itching    Januvia [sitagliptin] Hives    Carbamazepine Other (See Comments)     hyponatremia     Past Medical History:   Diagnosis Date    ADHD (attention deficit hyperactivity disorder)     Arthritis     Asthma     Bipolar 1 disorder     Cataract     Cigarette smoker     COPD (chronic obstructive pulmonary disease)     Coronary artery disease     A fib    Depression     bipolar manic depresson    Diabetes mellitus     Diabetic foot ulcers     Diabetic neuropathy     DVT of lower extremity, bilateral 07/2013    bilateral LE DVT. [place] filter placed.     Encounter for blood transfusion     History of blood clots 1. Left Leg=2003; 2.Bilateral Groin=Blood Clots= 5 or 6/ 2013 & 7/2013; 3. LLL of Lung=7/2013;  4. Lt. Lower Leg=7/2013.     Pt. had 1st Blood Clot after Twrusxftviwd=4870, & Last=2013. [place] Filter= Rt.Lateral Neck.    HTN  "(hypertension) 2013    Pt states that she does not have hypertension    Hypercholesteremia     Irregular heartbeat     Neuromuscular disorder     neuropathy feet    Obese     PE (pulmonary embolism) 2013    bilat LE DVT.     Restless leg syndrome      Past Surgical History:   Procedure Laterality Date    ABDOMINAL SURGERY      gastric sleeve    BILATERAL OOPHORECTOMY Bilateral 2015    CHOLECYSTECTOMY      DEBRIDEMENT OF FOOT Bilateral 5/10/2022    Procedure: DEBRIDEMENT, FOOT;  Surgeon: Maira De Los Santos DPM;  Location: [facility] OR;  Service: Podiatry;  Laterality: Bilateral;    Green' s filter Right 2012    Right Neck & Tunneled Down.    HERNIA REPAIR      "Rayle of Hernias Repaires around th [place].", pt. states    LAPAROSCOPIC CHOLECYSTECTOMY N/A 9/10/2020    Procedure: CHOLECYSTECTOMY, LAPAROSCOPIC;  Surgeon: Montrell Gutierrez MD;  Location: [facility] OR;  Service: General;  Laterality: N/A;  RN PREOP ----COVID Negative      OVARIAN CYST REMOVAL  3/13/2014    NC REMOVAL OF OVARY/TUBE(S)      SPLENECTOMY, TOTAL  2003    TONSILLECTOMY      as a child    TYMPANOSTOMY TUBE PLACEMENT  1976    VEIN SURGERY      Lt leg     Family History   Problem Relation Age of Onset    Hypertension Father     Diabetes Father     Heart disease Father     Cataracts Father     Diabetes Paternal Grandfather     Heart disease Paternal Grandfather     No Known Problems Mother     Ovarian cancer Maternal Grandmother          from this. ? age     No Known Problems Sister     No Known Problems Brother     No Known Problems Maternal Aunt     No Known Problems Maternal Uncle     No Known Problems Paternal Aunt     No Known Problems Paternal Uncle     No Known Problems Maternal Grandfather     Ovarian cancer Paternal Grandmother     Uterine cancer Neg Hx     Breast cancer Neg Hx     Colon cancer Neg Hx     Amblyopia Neg Hx     Blindness Neg Hx     Cancer Neg Hx     Glaucoma Neg Hx     Macular degeneration Neg " Hx     Retinal detachment Neg Hx     Strabismus Neg Hx     Stroke Neg Hx     Thyroid disease Neg Hx      Social History     Tobacco Use    Smoking status: Every Day     Packs/day: 1.00     Years: 37.00     Pack years: 37.00     Types: Cigarettes     Last attempt to quit: 2020     Years since quittin.7    Smokeless tobacco: Never    Tobacco comments:     Enrolled in the [employer] on 5/3/14 ([facility] Member ID # [id number]). Ambulatory referral to Smoking Cessation Program   Substance Use Topics    Alcohol use: No     Alcohol/week: 0.0 standard drinks    Drug use: No     Review of Systems   Constitutional:  Negative for fever.   HENT:  Negative for sore throat.    Respiratory:  Negative for shortness of breath.    Cardiovascular:  Positive for leg swelling. Negative for chest pain.   Gastrointestinal:  Negative for nausea.   Genitourinary:  Negative for dysuria.   Musculoskeletal:  Negative for back pain.   Skin:  Positive for wound. Negative for rash.   Neurological:  Negative for weakness.   Hematological:  Does not bruise/bleed easily.     Physical Exam     Initial Vitals [22 1920]   BP Pulse Resp Temp SpO2   (!) 152/68 84 18 98.1 °F (36.7 °C) 99 %      MAP       --         Physical Exam    Nursing note and vitals reviewed.  Constitutional: She appears well-developed and well-nourished. She is not diaphoretic.   HENT:   Head: Normocephalic and atraumatic.   Mouth/Throat: Oropharynx is clear and moist.   Eyes: EOM are normal. Pupils are equal, round, and reactive to light. Right eye exhibits no discharge. Left eye exhibits no discharge.   Neck: No tracheal deviation present.   Normal range of motion.  Cardiovascular:  Normal rate, regular rhythm and intact distal pulses.           Pulmonary/Chest: No respiratory distress. She has no wheezes. She exhibits no tenderness.   Abdominal: Abdomen is soft. She exhibits no distension. There is no abdominal tenderness.   Musculoskeletal:         General:  Tenderness and edema present. Normal range of motion.      Cervical back: Normal range of motion.      Comments: Scant pre-tibial edema.   Left heel wound healing     Neurological: She is alert and oriented to person, place, and time. She has normal strength. No cranial nerve deficit or sensory deficit. GCS eye subscore is 4. GCS verbal subscore is 5. GCS motor subscore is 6.   Skin: Skin is warm and dry. No rash noted.   Psychiatric: She has a normal mood and affect. Her behavior is normal. Thought content normal.       ED Course   Procedures  Labs Reviewed   CBC W/ AUTO DIFFERENTIAL - Abnormal; Notable for the following components:       Result Value    RBC 3.89 (*)     Hemoglobin 9.6 (*)     Hematocrit 28.2 (*)     MCV 73 (*)     MCH 24.7 (*)     RDW 18.4 (*)     Platelets 651 (*)     MPV 8.5 (*)     Immature Granulocytes 0.9 (*)     Immature Grans (Abs) 0.11 (*)     Lymph # 5.3 (*)     Mono # 1.4 (*)     Gran % 37.6 (*)     All other components within normal limits   COMPREHENSIVE METABOLIC PANEL - Abnormal; Notable for the following components:    Sodium 129 (*)     Chloride 91 (*)     Glucose 175 (*)     Albumin 3.1 (*)     AST 5 (*)     ALT 9 (*)     All other components within normal limits   B-TYPE NATRIURETIC PEPTIDE   TROPONIN I     EKG Readings: (Independently Interpreted)   EKG with regular rate, regular rhythm, normal axis, no acute ST elevations or depressions, normal TX, QRS and QT interval. Interpreted by me.       Imaging Results              US Lower Extremity Veins Bilateral (Final result)  Result time 09/12/22 23:17:37      Final result by Alla Russell MD (09/12/22 23:17:37)                   Impression:      1. No evidence of deep venous thrombosis in either lower extremity noting a single posterior tibial vein is identified bilaterally, unchanged from prior ultrasound examination of 04/13/2022.  2. Multiple enlarged inguinal lymph nodes.  This is of uncertain etiology/clinical significance.   Clinical correlation and further evaluation as warranted.      Electronically signed by: Alla Russell MD  Date:    09/12/2022  Time:    23:17               Narrative:    EXAMINATION:  US LOWER EXTREMITY VEINS BILATERAL    CLINICAL HISTORY:  Localized edema    TECHNIQUE:  Duplex and color flow Doppler and dynamic compression was performed of the bilateral lower extremity veins was performed.    COMPARISON:  None    FINDINGS:  Right thigh veins: The common femoral, femoral, popliteal, upper greater saphenous, and deep femoral veins are patent and free of thrombus. The veins are normally compressible and have normal phasic flow and augmentation response.    Right calf veins: The visualized calf veins are patent noting a single posterior tibial vein is identified, similar to prior exam of 04/13/2022..    Left thigh veins: The common femoral, femoral, popliteal, upper greater saphenous, and deep femoral veins are patent and free of thrombus. The veins are normally compressible and have normal phasic flow and augmentation response.    Left calf veins: The visualized calf veins are patent noting a single left posterior tibial vein is visualized, unchanged from prior exam of 04/13/2022.    Miscellaneous: There are enlarged inguinal lymph nodes bilaterally measuring up to 1.5 x 3.6 cm on the right and 3.6 x 1.7 cm on the left.                                       X-Ray Chest PA And Lateral (Final result)  Result time 09/12/22 20:38:25      Final result by Shalom Bro MD (09/12/22 20:38:25)                   Impression:      No acute cardiopulmonary process identified.      Electronically signed by: Shalom Bro MD  Date:    09/12/2022  Time:    20:38               Narrative:    EXAMINATION:  XR CHEST PA AND LATERAL    CLINICAL HISTORY:  Other specified soft tissue disorders    TECHNIQUE:  PA and lateral views of the chest were performed.    COMPARISON:  08/19/2022.    FINDINGS:  Cardiac silhouette is normal in  size.  Lungs are symmetrically expanded.  Mild chronic coarse interstitial lung changes are seen.  No evidence of new focal consolidative process, pneumothorax, or significant pleural effusion.  No acute osseous abnormality identified.                                       Medications   oxyCODONE-acetaminophen  mg per tablet 1 tablet (1 tablet Oral Given 9/12/22 2140)     Medical Decision Making:   Initial Assessment:   53 y/o female with chronic, slowly healing left heel wound now presents for ongoing bilateral lower extremity edema. Vitals are normal.  Differential Diagnosis:   Dependent edema, CHF, DVT, cellulitis, abscess  ED Management:  Patient reports compliance with antibiotics, labs normal, no leukocytosis to suggest acute on chronic wound infection. Bilateral DVT studies w/o evidence of DVT; patient on eliquis, doubt DVT.. CXR w/o acute cardiopulmonary abnormality, no signs of SOB. BNP WNL, doubt heart failure exacerbation. Sodium mildly decreased to 129, but patient tolerating PO intake, not altered, able to replenish hyponatremia and get close outpatient follow-up. Patient feeling better after a percocet. Stable for discharge to wound care appointment tomorrow. Advised her to continue taking her antibiotics as prescribed.She's comfortable with the plan.                        Clinical Impression:   Final diagnoses:  [M79.89] Leg swelling  [R60.0] Leg edema        ED Disposition Condition    Discharge Stable          ED Prescriptions    None       Follow-up Information       Follow up With Specialties Details Why Contact Info    Donaldo Pena MD Internal Medicine, Wound Care Go in 3 days To recheck today's symptoms [address] [facility] [address]  [phone]               Bucky Padgett MD  09/13/22 015       Bucky Padgett MD  09/13/22 5970

## 2022-09-13 NOTE — DISCHARGE INSTRUCTIONS
Follow up with your wound care clinic tomorrow.  You have no signs of DVT today      Thank you for coming to our Emergency Department today. It is important to remember that some problems or medical conditions are difficult to diagnose and may not be found during your Emergency Department visit.     Be sure to follow up with your primary care doctor and review all labs/imaging/tests that were performed during your ER visit with them. Some labs/tests may be outside of the normal range and require non-emergent follow-up and further investigation to help diagnose/exclude/prevent complications or other potentially serious medical conditions that were not addressed during your ER visit.    If you do not have a primary care doctor, you may contact the one listed on your discharge paperwork or you may also call the [facility] Appointment Desk at [phone] to schedule an appointment and establish care with one. Another resources for finding primary care physicians: [email or web address] It is important to your health that you have a primary care doctor.    Please take all medications as directed. All medications may potentially have side-effects and it is impossible to predict which medications may give you side-effects or what side-effects (if any) they will give you. If you feel that you are having a negative effect or side-effect of any medication you should immediately stop taking them and seek medical attention. If you feel that you are having a life-threatening reaction call 911.    Return to the ER with any questions/concerns, new/concerning symptoms, worsening or failure to improve.     Do not drive, swim, climb to height, take a bath, operate heavy machinery, drink alcohol or take potentially sedating medications, sign any legal documents or make any important decisions for 24 hours if you have received any pain medications, sedatives or mood altering drugs during your ER visit or within 24 hours of taking  them if they have been prescribed to you.     You can find additional resources for Dentists, hearing aids, durable medical equipment, low cost pharmacies and other resources at [email or web address]

## 2022-09-13 NOTE — FIRST PROVIDER EVALUATION
Emergency Department TeleTriage Encounter Note      CHIEF COMPLAINT    Chief Complaint   Patient presents with    Leg Swelling     Pt states that she had wound care on Wednesday and was admitted for wound care, she was discharged Friday. Pt now has severe bilateral leg swelling but the L foot where the wound is, is worse. Pt states she need more pain pills.        VITAL SIGNS   Initial Vitals [09/12/22 1920]   BP Pulse Resp Temp SpO2   (!) 152/68 84 18 98.1 °F (36.7 °C) 99 %      MAP       --            ALLERGIES    Review of patient's allergies indicates:   Allergen Reactions    Morphine Other (See Comments)     Patient had a psychotic episode after taking Morphine  Agitation, hallucinations    Penicillins Anaphylaxis     itching    Januvia [sitagliptin] Hives    Carbamazepine Other (See Comments)     hyponatremia       PROVIDER TRIAGE NOTE  This is a teletriage evaluation of a 52 y.o. female presenting to the ED complaining of lower extremity pain and swelling since being discharged from hospital.  Reports hx of blood clots.     Initial orders will be placed and care will be transferred to an alternate provider when patient is roomed for a full evaluation. Any additional orders and the final disposition will be determined by that provider.       ORDERS  Labs Reviewed   CBC W/ AUTO DIFFERENTIAL   COMPREHENSIVE METABOLIC PANEL   B-TYPE NATRIURETIC PEPTIDE   TROPONIN I       ED Orders (720h ago, onward)      Start Ordered     Status Ordering Provider    09/12/22 1959 09/12/22 1958  Troponin I  STAT         Ordered RACHEL ARTHUR    09/12/22 1959 09/12/22 1958  EKG 12-lead  Once         Ordered RACHEL ARTHUR    09/12/22 1959 09/12/22 1958  US Lower Extremity Veins Bilateral  1 time imaging         Ordered RACHEL ARTHUR    09/12/22 1958 09/12/22 1958  Saline lock IV  Once         Ordered RACHEL ARTHUR    09/12/22 1958 09/12/22 1958  CBC auto differential   STAT         Ordered RACHEL ARTHUR.    09/12/22 1958 09/12/22 1958  Comprehensive metabolic panel  STAT         Ordered RACHEL ARTHUR.    09/12/22 1958 09/12/22 1958  Brain natriuretic peptide  STAT         Ordered RACHEL ARTHUR.    09/12/22 1958 09/12/22 1958  X-Ray Chest PA And Lateral  1 time imaging         Ordered RACHEL ARTHUR              Virtual Visit Note: The provider triage portion of this emergency department evaluation and documentation was performed via [employer], a HIPAA-compliant telemedicine application, in concert with a tele-presenter in the room. A face to face patient evaluation with one of my colleagues will occur once the patient is placed in an emergency department room.      DISCLAIMER: This note was prepared with [employer] voice recognition transcription software. Garbled syntax, mangled pronouns, and other bizarre constructions may be attributed to that software system.

## 2022-09-15 ENCOUNTER — HOSPITAL ENCOUNTER (OUTPATIENT)
Dept: WOUND CARE | Facility: HOSPITAL | Age: 52
Discharge: HOME | End: 2022-09-15
Attending: FAMILY MEDICINE
Payer: MEDICAID

## 2022-09-15 VITALS — TEMPERATURE: 98 F | DIASTOLIC BLOOD PRESSURE: 71 MMHG | HEART RATE: 91 BPM | SYSTOLIC BLOOD PRESSURE: 167 MMHG

## 2022-09-15 PROCEDURE — 99213 OFFICE O/P EST LOW 20 MIN: CPT

## 2022-09-15 NOTE — PROGRESS NOTES
[facility]                Progress Note    Subjective:       Patient ID: Audery Natarajan is a 52 y.o. female.    Chief Complaint: No chief complaint on file.    HPI    Review of Systems      Objective:        Physical Exam    Vitals:    09/15/22 1511   BP: (!) 167/71   Pulse: 91   Temp: 97.5 °F (36.4 °C)       Assessment:         No diagnosis found.             Plan:                No orders of the defined types were placed in this encounter.       Follow up in about 1 week (around 9/22/2022).

## 2022-09-15 NOTE — PROGRESS NOTES
[facility]  [address]  [address]  Nurse Visit    Subjective:       Patient seen in clinic today.  Dressing changed as ordered. Walked to clinic stating feels much better and swelling and redness decreasing Continues to have slightly more swelling in L leg compared to R.Leg has trace of redness but no heat. Continues to complain pain but much less than she had and states even has pain meds left to take if needs. Continues on abx. Drainage through football, no absorbant pad on wound, long Mextra added. Had w/c delivered enc to use with leg elevator most of day to avoid swelling and pain. Tubigrip applied to try and decrease edema as had none on when arrived.      Assessment:          Wound 04/15/22 2230 Diabetic Ulcer Left medial;plantar Foot (Active)   04/15/22 2230    Pre-existing: Yes   Primary Wound Type: Diabetic ulc   Side: Left   Orientation: medial;plantar   Location: Foot   Wound Number:    Ankle-Brachial Index:    Pulses:    Removal Indication and Assessment:    Wound Outcome:    (Retired) Wound Type:    (Retired) Wound Length (cm):    (Retired) Wound Width (cm):    (Retired) Depth (cm):    Wound Description (Comments):    Removal Indications:    Wound WDL ex 09/15/22 1500   Dressing Appearance Dry;Intact 09/15/22 1500   Drainage Amount Copious 09/15/22 1500   Drainage Characteristics/Odor Serous;Clear 09/15/22 1500   Appearance Pink 09/15/22 1500   Tissue loss description Partial thickness 09/15/22 1500   Red (%), Wound Tissue Color 100 % 09/15/22 1500   Periwound Area Intact;Dry 09/15/22 1500   Wound Edges Defined 09/15/22 1500   Care Cleansed with:;Antimicrobial agent;Sterile normal saline 09/15/22 1500   Dressing Applied;Changed 09/15/22 1500   Dressing Change Due 09/22/22 09/15/22 1500           Plan:     No orders of the defined types were placed in this encounter.          Follow up in about 1 week (around 9/22/2022).

## 2022-09-19 ENCOUNTER — OFFICE VISIT (OUTPATIENT)
Dept: URGENT CARE | Facility: CLINIC | Age: 52
End: 2022-09-19
Payer: MEDICAID

## 2022-09-19 VITALS
RESPIRATION RATE: 17 BRPM | SYSTOLIC BLOOD PRESSURE: 122 MMHG | TEMPERATURE: 98 F | HEART RATE: 100 BPM | WEIGHT: 253 LBS | DIASTOLIC BLOOD PRESSURE: 71 MMHG | BODY MASS INDEX: 40.84 KG/M2 | OXYGEN SATURATION: 96 %

## 2022-09-19 DIAGNOSIS — T78.40XA ALLERGIC REACTION, INITIAL ENCOUNTER: Primary | ICD-10-CM

## 2022-09-19 PROCEDURE — 1159F PR MEDICATION LIST DOCUMENTED IN MEDICAL RECORD: ICD-10-PCS | Mod: CPTII,S$GLB,, | Performed by: FAMILY MEDICINE

## 2022-09-19 PROCEDURE — 3046F HEMOGLOBIN A1C LEVEL >9.0%: CPT | Mod: CPTII,S$GLB,, | Performed by: FAMILY MEDICINE

## 2022-09-19 PROCEDURE — 1160F RVW MEDS BY RX/DR IN RCRD: CPT | Mod: CPTII,S$GLB,, | Performed by: FAMILY MEDICINE

## 2022-09-19 PROCEDURE — 3078F PR MOST RECENT DIASTOLIC BLOOD PRESSURE < 80 MM HG: ICD-10-PCS | Mod: CPTII,S$GLB,, | Performed by: FAMILY MEDICINE

## 2022-09-19 PROCEDURE — 3074F SYST BP LT 130 MM HG: CPT | Mod: CPTII,S$GLB,, | Performed by: FAMILY MEDICINE

## 2022-09-19 PROCEDURE — 99213 PR OFFICE/OUTPT VISIT, EST, LEVL III, 20-29 MIN: ICD-10-PCS | Mod: S$GLB,,, | Performed by: FAMILY MEDICINE

## 2022-09-19 PROCEDURE — 1159F MED LIST DOCD IN RCRD: CPT | Mod: CPTII,S$GLB,, | Performed by: FAMILY MEDICINE

## 2022-09-19 PROCEDURE — 3046F PR MOST RECENT HEMOGLOBIN A1C LEVEL > 9.0%: ICD-10-PCS | Mod: CPTII,S$GLB,, | Performed by: FAMILY MEDICINE

## 2022-09-19 PROCEDURE — 99213 OFFICE O/P EST LOW 20 MIN: CPT | Mod: S$GLB,,, | Performed by: FAMILY MEDICINE

## 2022-09-19 PROCEDURE — 3008F PR BODY MASS INDEX (BMI) DOCUMENTED: ICD-10-PCS | Mod: CPTII,S$GLB,, | Performed by: FAMILY MEDICINE

## 2022-09-19 PROCEDURE — 3078F DIAST BP <80 MM HG: CPT | Mod: CPTII,S$GLB,, | Performed by: FAMILY MEDICINE

## 2022-09-19 PROCEDURE — 4010F ACE/ARB THERAPY RXD/TAKEN: CPT | Mod: CPTII,S$GLB,, | Performed by: FAMILY MEDICINE

## 2022-09-19 PROCEDURE — 4010F PR ACE/ARB THEARPY RXD/TAKEN: ICD-10-PCS | Mod: CPTII,S$GLB,, | Performed by: FAMILY MEDICINE

## 2022-09-19 PROCEDURE — 3074F PR MOST RECENT SYSTOLIC BLOOD PRESSURE < 130 MM HG: ICD-10-PCS | Mod: CPTII,S$GLB,, | Performed by: FAMILY MEDICINE

## 2022-09-19 PROCEDURE — 3008F BODY MASS INDEX DOCD: CPT | Mod: CPTII,S$GLB,, | Performed by: FAMILY MEDICINE

## 2022-09-19 PROCEDURE — 1160F PR REVIEW ALL MEDS BY PRESCRIBER/CLIN PHARMACIST DOCUMENTED: ICD-10-PCS | Mod: CPTII,S$GLB,, | Performed by: FAMILY MEDICINE

## 2022-09-19 RX ORDER — CEFEPIME HYDROCHLORIDE 1 G/1
INJECTION, POWDER, FOR SOLUTION INTRAMUSCULAR; INTRAVENOUS
Status: ON HOLD | COMMUNITY
Start: 2022-09-13 | End: 2022-12-27 | Stop reason: HOSPADM

## 2022-09-19 RX ORDER — TRIAMCINOLONE ACETONIDE 0.25 MG/G
CREAM TOPICAL 2 TIMES DAILY
Qty: 80 G | Refills: 0 | Status: SHIPPED | OUTPATIENT
Start: 2022-09-19 | End: 2023-01-09

## 2022-09-19 RX ORDER — COVID-19 MOLECULAR TEST ASSAY
KIT MISCELLANEOUS
COMMUNITY
Start: 2022-08-03 | End: 2023-01-09

## 2022-09-19 RX ORDER — EPINEPHRINE 0.3 MG/.3ML
1 INJECTION SUBCUTANEOUS ONCE
Qty: 0.3 ML | Refills: 0 | Status: SHIPPED | OUTPATIENT
Start: 2022-09-19 | End: 2022-12-22 | Stop reason: CLARIF

## 2022-09-19 RX ORDER — BLOOD-GLUCOSE METER
EACH MISCELLANEOUS
COMMUNITY
Start: 2022-07-19 | End: 2022-12-22 | Stop reason: CLARIF

## 2022-09-19 RX ORDER — DEXAMETHASONE SODIUM PHOSPHATE 100 MG/10ML
8 INJECTION INTRAMUSCULAR; INTRAVENOUS
Status: COMPLETED | OUTPATIENT
Start: 2022-09-19 | End: 2022-09-19

## 2022-09-19 RX ORDER — LISDEXAMFETAMINE DIMESYLATE 40 MG/1
40 CAPSULE ORAL DAILY
COMMUNITY
Start: 2022-09-14

## 2022-09-19 RX ORDER — METHOCARBAMOL 500 MG/1
500 TABLET, FILM COATED ORAL
COMMUNITY
Start: 2022-08-30 | End: 2023-05-12 | Stop reason: SDUPTHER

## 2022-09-19 RX ADMIN — DEXAMETHASONE SODIUM PHOSPHATE 8 MG: 100 INJECTION INTRAMUSCULAR; INTRAVENOUS at 07:09

## 2022-09-19 NOTE — PROGRESS NOTES
Subjective:       Patient ID: Audrey Natarajan is a 52 y.o. female.    Vitals:  weight is 114.8 kg (253 lb). Her temperature is 98.2 °F (36.8 °C). Her blood pressure is 122/71 and her pulse is 100. Her respiration is 17 and oxygen saturation is 96%.     Chief Complaint: Allergic Reaction    Pt sates she has been having an allergic reaction / itching since today   Provider note begins below:  Past Medical History:  No date: ADHD (attention deficit hyperactivity disorder)  No date: Arthritis  No date: Asthma  No date: Bipolar 1 disorder  No date: Cataract  No date: Cigarette smoker  No date: COPD (chronic obstructive pulmonary disease)  No date: Coronary artery disease      Comment:  A fib  No date: Depression      Comment:  bipolar manic depresson  No date: Diabetes mellitus  No date: Diabetic foot ulcers  No date: Diabetic neuropathy  07/2013: DVT of lower extremity, bilateral      Comment:  bilateral LE DVT. [place] filter placed.   No date: Encounter for blood transfusion  1. Left Leg=2003; 2.Bilateral Groin=Blood Clots= 5 or 6/ 2013 & 7/ 2013; 3. LLL of Lung=7/2013;  4. Lt. Lower Leg=7/2013. : History of   blood clots      Comment:  Pt. had 1st Blood Clot after [id number], &                Last=2013. [place] Filter= Rt.Lateral Neck.  06/06/2013: HTN (hypertension)      Comment:  Pt states that she does not have hypertension  No date: Hypercholesteremia  No date: Irregular heartbeat  No date: Neuromuscular disorder      Comment:  neuropathy feet  No date: Obese  07/2013: PE (pulmonary embolism)      Comment:  bilat LE DVT.   No date: Restless leg syndrome   Pt with complex pmh, she gets dupixent every 2 weeks for eczema, recently hospitalized for cellulitis to left foot, she is wearing a boot, she completed her cefadroxil today. Denies any new foods, she started with facial swelling and itching today around 1230. She says she has welps in her scalp. She has DM she takes metformin, not currently  on any insulin. She bought hydrocortisone cream and is applying it during the visit, she has eye lid swelling and lower lip swelling and generalized itching.     Allergic Reaction  This is a new problem. The current episode started today. The problem is unchanged. The problem is moderate. It is unknown what she was exposed to. The time of exposure was just prior to onset. Associated symptoms include itching and a rash. Pertinent negatives include no chest pain, coughing, difficulty breathing, drooling, eye itching, eye redness, skin blistering, stridor, trouble swallowing, vomiting or wheezing. Past treatments include one or more OTC medications. The treatment provided mild relief. Her past medical history is significant for atopic dermatitis. There is no history of asthma, food allergies, medication allergies or seasonal allergies.     Constitution: Negative for activity change, appetite change, chills, sweating, fatigue and fever.   HENT:  Negative for drooling and trouble swallowing.    Cardiovascular:  Negative for chest pain, leg swelling, palpitations and sob on exertion.   Eyes:  Positive for eyelid swelling. Negative for eye itching and eye redness.   Respiratory:  Positive for asthma. Negative for cough, stridor and wheezing.    Gastrointestinal:  Negative for vomiting.   Skin:  Positive for rash and hives.   Allergic/Immunologic: Positive for eczema, asthma, hives and itching. Negative for seasonal allergies and food allergies.     Objective:      Physical Exam   Constitutional: She is oriented to person, place, and time. She appears well-developed.  Non-toxic appearance. She does not appear ill. No distress.      Comments:Scratching bue and scalp continually during exam.   Wearing surgical boots/sandals   morbidly obese  HENT:   Head: Normocephalic and atraumatic. Head is with right periorbital erythema and with left periorbital erythema.   Ears:   Right Ear: External ear normal.   Left Ear: External ear  normal.   Nose: Nose normal.   Mouth/Throat: Uvula is midline, oropharynx is clear and moist and mucous membranes are normal. No trismus in the jaw. No uvula swelling. No oropharyngeal exudate, posterior oropharyngeal edema, posterior oropharyngeal erythema, tonsillar abscesses or cobblestoning. No tonsillar exudate.       Eyes: Conjunctivae, EOM and lids are normal. Pupils are equal, round, and reactive to light.      Comments: Periorbital swelling   Neck: Trachea normal and phonation normal. Neck supple.   Musculoskeletal: Normal range of motion.         General: Normal range of motion.   Neurological: She is alert and oriented to person, place, and time.   Skin: Skin is warm, dry, intact, not diaphoretic and rash (generalized hives). Capillary refill takes less than 2 seconds.   Psychiatric: Her speech is normal and behavior is normal. Judgment and thought content normal.   Nursing note and vitals reviewed.      Assessment:       1. Allergic reaction, initial encounter          Plan:       Steroid inj, discussed risks versus benefits with history of diabetes, she would like to proceed with steroid injection  I have discussed in detail with pt the side effects of steroids including mental changes, sleep disturbance, skin changes at the injection site, suicidal ideations, increased blood pressure, increased glucose, and DVT and PE.      Discussed results/diagnosis/plan with patient in clinic. Strict precautions given to patient to monitor for worsening signs and symptoms. Advised to follow up with PCP or specialist.    Explained side effects of medications prescribed with patient and informed him/her to discontinue use if he/she has any side effects and to inform UC or PCP if this occurs. All questions answered. Strict ED verses clinic return precautions stressed and given in depth. Advised if symptoms worsens of fail to improve he/she should go to the Emergency Room. Discharge and follow-up instructions given  verbally/printed with the patient who expressed understanding and willingness to comply with my recommendations. Patient voiced understanding and in agreement with current treatment plan. Patient exits the exam room in no acute distress. Conversant and engaged during discharge discussion, verbalized understanding.      Allergic reaction, initial encounter  -     Ambulatory referral/consult to Allergy  -     triamcinolone acetonide 0.025% (KENALOG) 0.025 % cream; Apply topically 2 (two) times daily.  Dispense: 80 g; Refill: 0  -     dexamethasone injection 8 mg  -     EPINEPHrine (EPIPEN 2-TAMEKA) 0.3 mg/0.3 mL AtIn; Inject 0.3 mLs (0.3 mg total) into the muscle once. for 1 dose  Dispense: 0.3 mL; Refill: 0         Medical Decision Making:   History:   Old Medical Records: I decided to obtain old medical records.  Old Records Summarized: records from clinic visits and other records.     Patient Instructions   General Discharge Instructions   PLEASE READ YOUR DISCHARGE INSTRUCTIONS ENTIRELY AS IT CONTAINS IMPORTANT INFORMATION.  If you were prescribed a narcotic or controlled medication, do not drive or operate heavy equipment or machinery while taking these medications.  If you were prescribed antibiotics, please take them to completion.  You must understand that you've received an Urgent Care treatment only and that you may be released before all your medical problems are known or treated. You, the patient, will arrange for follow up care as instructed.    OVER THE COUNTER RECOMMENDATIONS/SUGGESTIONS.    Make sure to stay well hydrated.    Use Nasal Saline to mechanically move any post nasal drip from your eustachian tube or from the back of your throat.    Use warm salt water gargles to ease your throat pain. Warm salt water gargles as needed for sore throat- 1/2 tsp salt to 1 cup warm water, gargle as desired.    Use an antihistamine such as Claritin, Zyrtec or Allegra to dry you out.    Use pseudoephedrine (behind  the counter) to decongest. Pseudoephedrine 30 mg up to 240 mg /day. It can raise your blood pressure and give you palpitations.    Use mucinex (guaifenesin) to break up mucous up to 2400mg/day to loosen any mucous.    The mucinex DM pill has a cough suppressant that can be sedating. It can be used at night to stop the tickle at the back of your throat.    You can use Mucinex D (it has guaifenesin and a high dose of pseudoephedrine) in the mornings to help decongest.    Use Afrin in each nare for no longer than 3 days, as it is addictive. It can also dry out your mucous membranes and cause elevated blood pressure. This is especially useful if you are flying.    Use Flonase 1-2 sprays/nostril per day. It is a local acting steroid nasal spray, if you develop a bloody nose, stop using the medication immediately.    Sometimes Nyquil at night is beneficial to help you get some rest, however it is sedating and it does have an antihistamine, and tylenol.    Honey is a natural cough suppressant that can be used.    Tylenol up to 4,000 mg a day is safe for short periods and can be used for body aches, pain, and fever. However in high doses and prolonged use it can cause liver irritation.    Ibuprofen is a non-steroidal anti-inflammatory that can be used for body aches, pain, and fever.However it can also cause stomach irritation if over used.     Follow up with your PCP or specialty clinic as instructed in the next 2-3 days if not improved or as needed. You can call [phone] to schedule an appointment with appropriate provider.      If you condition worsens, we recommend that you receive another evaluation at the emergency room immediately or contact your primary medical clinic's after hours call service to discuss your concerns.      Please return here or go to the Emergency Department for any concerns or worsening condition.   You can also call [phone] to schedule an appointment with the appropriate  provider.    Please return here or go to the Emergency Department for any concerns or worsening of condition.    Thank you for choosing [facility]!    Our goal in the Urgent Care is to always provide outstanding medical care. You may receive a survey by mail or e-mail in the next week regarding your experience today. We would greatly appreciate you completing and returning the survey. Your feedback provides us with a way to recognize our staff who provide very good care, and it helps us learn how to improve when your experience was below our aspiration of excellence.      We appreciate you trusting us with your medical care. We hope you feel better soon. We will be happy to take care of you for all of your future medical needs.    Sincerely,    SHAHRZAD Sherwood  Steroid Injection  You received a steroid shot today - As discussed, this can elevate your blood pressure, elevate your blood sugar, water weight gain, nervous energy, redness to the face and dimpling of the skin where the shot goes in.

## 2022-09-20 ENCOUNTER — HOSPITAL ENCOUNTER (OUTPATIENT)
Dept: WOUND CARE | Facility: HOSPITAL | Age: 52
Discharge: HOME | End: 2022-09-20
Attending: FAMILY MEDICINE
Payer: MEDICAID

## 2022-09-20 VITALS — DIASTOLIC BLOOD PRESSURE: 70 MMHG | SYSTOLIC BLOOD PRESSURE: 122 MMHG | TEMPERATURE: 98 F | HEART RATE: 88 BPM

## 2022-09-20 DIAGNOSIS — M14.672 CHARCOT'S JOINT OF LEFT FOOT: Primary | ICD-10-CM

## 2022-09-20 DIAGNOSIS — E11.621 DIABETIC FOOT ULCER ASSOCIATED WITH TYPE 2 DIABETES MELLITUS: ICD-10-CM

## 2022-09-20 DIAGNOSIS — L97.509 DIABETIC FOOT ULCER ASSOCIATED WITH TYPE 2 DIABETES MELLITUS: ICD-10-CM

## 2022-09-20 DIAGNOSIS — L97.423 LEFT MIDFOOT ULCER, WITH NECROSIS OF MUSCLE: ICD-10-CM

## 2022-09-20 PROCEDURE — 99212 OFFICE O/P EST SF 10 MIN: CPT

## 2022-09-20 NOTE — PATIENT INSTRUCTIONS
General Discharge Instructions   PLEASE READ YOUR DISCHARGE INSTRUCTIONS ENTIRELY AS IT CONTAINS IMPORTANT INFORMATION.  If you were prescribed a narcotic or controlled medication, do not drive or operate heavy equipment or machinery while taking these medications.  If you were prescribed antibiotics, please take them to completion.  You must understand that you've received an Urgent Care treatment only and that you may be released before all your medical problems are known or treated. You, the patient, will arrange for follow up care as instructed.    OVER THE COUNTER RECOMMENDATIONS/SUGGESTIONS.    Make sure to stay well hydrated.    Use Nasal Saline to mechanically move any post nasal drip from your eustachian tube or from the back of your throat.    Use warm salt water gargles to ease your throat pain. Warm salt water gargles as needed for sore throat- 1/2 tsp salt to 1 cup warm water, gargle as desired.    Use an antihistamine such as Claritin, Zyrtec or Allegra to dry you out.    Use pseudoephedrine (behind the counter) to decongest. Pseudoephedrine 30 mg up to 240 mg /day. It can raise your blood pressure and give you palpitations.    Use mucinex (guaifenesin) to break up mucous up to 2400mg/day to loosen any mucous.    The mucinex DM pill has a cough suppressant that can be sedating. It can be used at night to stop the tickle at the back of your throat.    You can use Mucinex D (it has guaifenesin and a high dose of pseudoephedrine) in the mornings to help decongest.    Use Afrin in each nare for no longer than 3 days, as it is addictive. It can also dry out your mucous membranes and cause elevated blood pressure. This is especially useful if you are flying.    Use Flonase 1-2 sprays/nostril per day. It is a local acting steroid nasal spray, if you develop a bloody nose, stop using the medication immediately.    Sometimes Nyquil at night is beneficial to help you get some rest, however it is sedating and it  does have an antihistamine, and tylenol.    Honey is a natural cough suppressant that can be used.    Tylenol up to 4,000 mg a day is safe for short periods and can be used for body aches, pain, and fever. However in high doses and prolonged use it can cause liver irritation.    Ibuprofen is a non-steroidal anti-inflammatory that can be used for body aches, pain, and fever.However it can also cause stomach irritation if over used.     Follow up with your PCP or specialty clinic as instructed in the next 2-3 days if not improved or as needed. You can call [phone] to schedule an appointment with appropriate provider.      If you condition worsens, we recommend that you receive another evaluation at the emergency room immediately or contact your primary medical clinic's after hours call service to discuss your concerns.      Please return here or go to the Emergency Department for any concerns or worsening condition.   You can also call [phone] to schedule an appointment with the appropriate provider.    Please return here or go to the Emergency Department for any concerns or worsening of condition.    Thank you for choosing [facility]!    Our goal in the Urgent Care is to always provide outstanding medical care. You may receive a survey by mail or e-mail in the next week regarding your experience today. We would greatly appreciate you completing and returning the survey. Your feedback provides us with a way to recognize our staff who provide very good care, and it helps us learn how to improve when your experience was below our aspiration of excellence.      We appreciate you trusting us with your medical care. We hope you feel better soon. We will be happy to take care of you for all of your future medical needs.    Sincerely,    SHAHRZAD Sherwood  Steroid Injection  You received a steroid shot today - As discussed, this can elevate your blood pressure, elevate your blood sugar, water weight  gain, nervous energy, redness to the face and dimpling of the skin where the shot goes in.

## 2022-09-20 NOTE — PROGRESS NOTES
[facility]  [address]  [address]  Nurse Visit    Subjective:       Patient seen in clinic today.  Dressing changed as ordered. Patient states she had an allergic reaction yesterday (eye and upper extremity swelling), she was seen at Urgent Care and will follow up with dermatology today.       Assessment:          Wound 04/15/22 2230 Diabetic Ulcer Left medial;plantar Foot (Active)   04/15/22 2230    Pre-existing: Yes   Primary Wound Type: Diabetic ulc   Side: Left   Orientation: medial;plantar   Location: Foot   Wound Number:    Ankle-Brachial Index:    Pulses:    Removal Indication and Assessment:    Wound Outcome:    (Retired) Wound Type:    (Retired) Wound Length (cm):    (Retired) Wound Width (cm):    (Retired) Depth (cm):    Wound Description (Comments):    Removal Indications:    Wound WDL ex 09/20/22 1100   Dressing Appearance Intact;Moist drainage;Area marked 09/20/22 1100   Drainage Amount Copious 09/20/22 1100   Drainage Characteristics/Odor Serous 09/20/22 1100   Appearance Pink;Moist 09/20/22 1100   Tissue loss description Partial thickness 09/20/22 1100   Black (%), Wound Tissue Color 0 % 09/20/22 1100   Red (%), Wound Tissue Color 100 % 09/20/22 1100   Yellow (%), Wound Tissue Color 0 % 09/20/22 1100   Periwound Area Intact 09/20/22 1100   Wound Edges Callused;Open 09/20/22 1100   Care Cleansed with:;Antimicrobial agent;Sterile normal saline 09/20/22 1100   Dressing Changed 09/20/22 1100   Compression Tubular elasticized bandage 09/20/22 1100   Off Loading Football dressing;Off loading shoe 09/20/22 1100   Dressing Change Due 09/23/22 09/20/22 1100           Plan:     No orders of the defined types were placed in this encounter.          Follow up in about 3 days (around 9/23/2022) for wound care.

## 2022-09-22 ENCOUNTER — TELEPHONE (OUTPATIENT)
Dept: FAMILY MEDICINE | Facility: CLINIC | Age: 52
End: 2022-09-22

## 2022-09-22 NOTE — TELEPHONE ENCOUNTER
----- Message from Margarita Goodman sent at 9/22/2022  3:34 PM CDT -----  Type: RX Refill Request    Who Called: pt     Have you contacted your pharmacy:    Refill or New Rx:albuterol-ipratropium (DUO-NEB) 2.5 mg-0.5 mg/3 mL nebulizer solution - needs pa    miconazole nitrate, bulk, Powd - needs pa    RX Name and Strength:    How is the patient currently taking it? (ex. 1XDay):    Is this a 30 day or 90 day RX:    Preferred Pharmacy with phone number:  [id number]  [address] [place] [address]  Phone: [phone] Fax: [phone]          Local or Mail Order:    Ordering Provider:    Would the patient rather a call back or a response via [facility]? call    Best Call Back Number:[phone] (home)       Additional Information:

## 2022-09-23 ENCOUNTER — HOSPITAL ENCOUNTER (OUTPATIENT)
Dept: WOUND CARE | Facility: HOSPITAL | Age: 52
Discharge: HOME | End: 2022-09-23
Attending: PODIATRIST
Payer: MEDICAID

## 2022-09-23 VITALS — HEART RATE: 86 BPM | DIASTOLIC BLOOD PRESSURE: 94 MMHG | SYSTOLIC BLOOD PRESSURE: 135 MMHG | TEMPERATURE: 98 F

## 2022-09-23 DIAGNOSIS — L97.423 LEFT MIDFOOT ULCER, WITH NECROSIS OF MUSCLE: ICD-10-CM

## 2022-09-23 DIAGNOSIS — M14.672 CHARCOT'S JOINT OF LEFT FOOT: Primary | ICD-10-CM

## 2022-09-23 PROCEDURE — 99213 OFFICE O/P EST LOW 20 MIN: CPT | Performed by: PODIATRIST

## 2022-09-23 NOTE — PROGRESS NOTES
[facility]                Progress Note    Subjective:       Patient ID: Audrey Natarajan is a 52 y.o. female.    Chief Complaint: Wound Check    F/u wound care visit. Patient ambulatory to exam room with no c/o pain or discomfort at present. Wound dressing to left foot intact with large amount of drainage noted. Wound to left plantar foot measuring smaller in all dimensions. Home wound care instructions given to patient verbally with understanding verbalized per patient. Wound care done per order. RTC in one week.      Review of Systems      Objective:        Physical Exam    Vitals:    09/23/22 1103   BP: (!) 135/94   Pulse: 86   Temp: 97.7 °F (36.5 °C)       Assessment:           ICD-10-CM ICD-9-CM   1. Charcot's joint of left foot  M14.672 094.0     713.5   2. Left midfoot ulcer, with necrosis of muscle  L97.423 707.14     728.89            Wound 04/15/22 2230 Diabetic Ulcer Left medial;plantar Foot (Active)   04/15/22 2230    Pre-existing: Yes   Primary Wound Type: Diabetic ulc   Side: Left   Orientation: medial;plantar   Location: Foot   Wound Number:    Ankle-Brachial Index:    Pulses:    Removal Indication and Assessment:    Wound Outcome:    (Retired) Wound Type:    (Retired) Wound Length (cm):    (Retired) Wound Width (cm):    (Retired) Depth (cm):    Wound Description (Comments):    Removal Indications:    Wound Image   09/23/22 1100   Wound WDL ex 09/23/22 1100   Dressing Appearance Intact;Moist drainage 09/23/22 1100   Drainage Amount Large 09/23/22 1100   Drainage Characteristics/Odor Serosanguineous 09/23/22 1100   Appearance Red 09/23/22 1100   Tissue loss description Partial thickness 09/23/22 1100   Black (%), Wound Tissue Color 0 % 09/23/22 1100   Red (%), Wound Tissue Color 100 % 09/23/22 1100   Yellow (%), Wound Tissue Color 0 % 09/23/22 1100   Periwound Area Macerated 09/23/22 1100   Wound Edges Defined 09/23/22 1100   Wound Length (cm) 3.3 cm  09/23/22 1100   Wound Width (cm) 4 cm 09/23/22 1100   Wound Depth (cm) 0.1 cm 09/23/22 1100   Wound Volume (cm^3) 1.32 cm^3 09/23/22 1100   Wound Surface Area (cm^2) 13.2 cm^2 09/23/22 1100   Care Cleansed with:;Soap and water;Sterile normal saline 09/23/22 1100   Dressing Changed 09/23/22 1100   Periwound Care Moisture barrier applied 09/23/22 1100   Compression Tubular elasticized bandage 09/23/22 1100   Off Loading Football dressing;Off loading shoe 09/23/22 1100   Dressing Change Due 09/30/22 09/23/22 1100           Plan:                Orders Placed This Encounter   Procedures    Change dressing     Clean with NS. Vashe soak x10 minutes. Gentian violet to periwound. Iodosorb to wound bed (pt forgot antibiotic powder). Mextra short x1. Alex foam long x2. Football dressing (cast padding x3). Tubigrip size E (calf size 42.5cm).    Patient to change on Monday then daily: Clean with NS. Apply antibiotic powder to wound bed. Cover with foam border. RTC in a week.        Follow up in about 1 week (around 9/30/2022) for wound care visit.

## 2022-09-27 ENCOUNTER — OFFICE VISIT (OUTPATIENT)
Dept: FAMILY MEDICINE | Facility: CLINIC | Age: 52
End: 2022-09-27
Payer: MEDICAID

## 2022-09-27 VITALS
SYSTOLIC BLOOD PRESSURE: 130 MMHG | BODY MASS INDEX: 39.4 KG/M2 | HEIGHT: 66 IN | TEMPERATURE: 98 F | HEART RATE: 78 BPM | OXYGEN SATURATION: 99 % | DIASTOLIC BLOOD PRESSURE: 80 MMHG | WEIGHT: 245.13 LBS

## 2022-09-27 DIAGNOSIS — Z12.31 ENCOUNTER FOR SCREENING MAMMOGRAM FOR MALIGNANT NEOPLASM OF BREAST: ICD-10-CM

## 2022-09-27 DIAGNOSIS — Z12.11 SCREENING FOR COLON CANCER: ICD-10-CM

## 2022-09-27 DIAGNOSIS — L97.509 TYPE 2 DIABETES MELLITUS WITH FOOT ULCER, WITHOUT LONG-TERM CURRENT USE OF INSULIN: Primary | ICD-10-CM

## 2022-09-27 DIAGNOSIS — Z79.01 LONG TERM (CURRENT) USE OF ANTICOAGULANTS: ICD-10-CM

## 2022-09-27 DIAGNOSIS — Z23 NEED FOR INFLUENZA VACCINATION: ICD-10-CM

## 2022-09-27 DIAGNOSIS — B35.9 TINEA: ICD-10-CM

## 2022-09-27 DIAGNOSIS — E11.42 TYPE 2 DIABETES MELLITUS WITH DIABETIC POLYNEUROPATHY, WITHOUT LONG-TERM CURRENT USE OF INSULIN: ICD-10-CM

## 2022-09-27 DIAGNOSIS — J44.9 CHRONIC OBSTRUCTIVE PULMONARY DISEASE, UNSPECIFIED COPD TYPE: ICD-10-CM

## 2022-09-27 DIAGNOSIS — K21.00 GASTROESOPHAGEAL REFLUX DISEASE WITH ESOPHAGITIS WITHOUT HEMORRHAGE: ICD-10-CM

## 2022-09-27 DIAGNOSIS — E11.621 TYPE 2 DIABETES MELLITUS WITH FOOT ULCER, WITHOUT LONG-TERM CURRENT USE OF INSULIN: Primary | ICD-10-CM

## 2022-09-27 DIAGNOSIS — I82.5Y9 CHRONIC DEEP VEIN THROMBOSIS (DVT) OF PROXIMAL VEIN OF LOWER EXTREMITY, UNSPECIFIED LATERALITY: ICD-10-CM

## 2022-09-27 PROCEDURE — 3079F PR MOST RECENT DIASTOLIC BLOOD PRESSURE 80-89 MM HG: ICD-10-PCS | Mod: CPTII,,, | Performed by: INTERNAL MEDICINE

## 2022-09-27 PROCEDURE — 3075F PR MOST RECENT SYSTOLIC BLOOD PRESS GE 130-139MM HG: ICD-10-PCS | Mod: CPTII,,, | Performed by: INTERNAL MEDICINE

## 2022-09-27 PROCEDURE — 3008F BODY MASS INDEX DOCD: CPT | Mod: CPTII,,, | Performed by: INTERNAL MEDICINE

## 2022-09-27 PROCEDURE — 1159F PR MEDICATION LIST DOCUMENTED IN MEDICAL RECORD: ICD-10-PCS | Mod: CPTII,,, | Performed by: INTERNAL MEDICINE

## 2022-09-27 PROCEDURE — 99999 PR PBB SHADOW E&M-EST. PATIENT-LVL V: ICD-10-PCS | Mod: PBBFAC,,, | Performed by: INTERNAL MEDICINE

## 2022-09-27 PROCEDURE — 3052F PR MOST RECENT HEMOGLOBIN A1C LEVEL 8.0 - < 9.0%: ICD-10-PCS | Mod: CPTII,,, | Performed by: INTERNAL MEDICINE

## 2022-09-27 PROCEDURE — 1159F MED LIST DOCD IN RCRD: CPT | Mod: CPTII,,, | Performed by: INTERNAL MEDICINE

## 2022-09-27 PROCEDURE — 3079F DIAST BP 80-89 MM HG: CPT | Mod: CPTII,,, | Performed by: INTERNAL MEDICINE

## 2022-09-27 PROCEDURE — 99214 OFFICE O/P EST MOD 30 MIN: CPT | Mod: S$PBB,,, | Performed by: INTERNAL MEDICINE

## 2022-09-27 PROCEDURE — 1160F PR REVIEW ALL MEDS BY PRESCRIBER/CLIN PHARMACIST DOCUMENTED: ICD-10-PCS | Mod: CPTII,,, | Performed by: INTERNAL MEDICINE

## 2022-09-27 PROCEDURE — 99215 OFFICE O/P EST HI 40 MIN: CPT | Mod: PBBFAC,PN | Performed by: INTERNAL MEDICINE

## 2022-09-27 PROCEDURE — 3075F SYST BP GE 130 - 139MM HG: CPT | Mod: CPTII,,, | Performed by: INTERNAL MEDICINE

## 2022-09-27 PROCEDURE — 3052F HG A1C>EQUAL 8.0%<EQUAL 9.0%: CPT | Mod: CPTII,,, | Performed by: INTERNAL MEDICINE

## 2022-09-27 PROCEDURE — 3008F PR BODY MASS INDEX (BMI) DOCUMENTED: ICD-10-PCS | Mod: CPTII,,, | Performed by: INTERNAL MEDICINE

## 2022-09-27 PROCEDURE — 99999 PR PBB SHADOW E&M-EST. PATIENT-LVL V: CPT | Mod: PBBFAC,,, | Performed by: INTERNAL MEDICINE

## 2022-09-27 PROCEDURE — 1111F DSCHRG MED/CURRENT MED MERGE: CPT | Mod: CPTII,,, | Performed by: INTERNAL MEDICINE

## 2022-09-27 PROCEDURE — 4010F PR ACE/ARB THEARPY RXD/TAKEN: ICD-10-PCS | Mod: CPTII,,, | Performed by: INTERNAL MEDICINE

## 2022-09-27 PROCEDURE — 1160F RVW MEDS BY RX/DR IN RCRD: CPT | Mod: CPTII,,, | Performed by: INTERNAL MEDICINE

## 2022-09-27 PROCEDURE — 99214 PR OFFICE/OUTPT VISIT, EST, LEVL IV, 30-39 MIN: ICD-10-PCS | Mod: S$PBB,,, | Performed by: INTERNAL MEDICINE

## 2022-09-27 PROCEDURE — 4010F ACE/ARB THERAPY RXD/TAKEN: CPT | Mod: CPTII,,, | Performed by: INTERNAL MEDICINE

## 2022-09-27 PROCEDURE — 1111F PR DISCHARGE MEDS RECONCILED W/ CURRENT OUTPATIENT MED LIST: ICD-10-PCS | Mod: CPTII,,, | Performed by: INTERNAL MEDICINE

## 2022-09-27 PROCEDURE — 90686 IIV4 VACC NO PRSV 0.5 ML IM: CPT | Mod: PBBFAC,PN

## 2022-09-27 RX ORDER — SEMAGLUTIDE 1.34 MG/ML
1 INJECTION, SOLUTION SUBCUTANEOUS
Qty: 1 PEN | Refills: 2 | Status: SHIPPED | OUTPATIENT
Start: 2022-09-27 | End: 2023-01-26

## 2022-09-27 RX ORDER — PANTOPRAZOLE SODIUM 40 MG/1
40 TABLET, DELAYED RELEASE ORAL DAILY
Qty: 90 TABLET | Refills: 1 | Status: SHIPPED | OUTPATIENT
Start: 2022-09-27 | End: 2022-10-19 | Stop reason: SDUPTHER

## 2022-09-27 RX ORDER — MICONAZOLE NITRATE
POWDER (GRAM) MISCELLANEOUS
Qty: 100 G | Refills: 0 | Status: SHIPPED | OUTPATIENT
Start: 2022-09-27 | End: 2022-10-20 | Stop reason: CLARIF

## 2022-09-27 RX ORDER — ALBUTEROL SULFATE 90 UG/1
2 AEROSOL, METERED RESPIRATORY (INHALATION) EVERY 6 HOURS PRN
Qty: 18 G | Refills: 0 | Status: SHIPPED | OUTPATIENT
Start: 2022-09-27 | End: 2022-10-20

## 2022-09-27 NOTE — PROGRESS NOTES
"  Subjective:       Patient ID: Audrey Natarajan is a 52 y.o. female.    Chief Complaint: Follow-up (2months)    F/u chronic conditions    HPI: 53 y/o w/ bipolar disorder recurrent DVT on noac DM with left foot plantar ulcer followed in wound care clinic presents alone for follow up. Since last visit with me she has obtained a new [employer] trailer but is still in process of moving. She has been changing her left foot dressing her self once per week and following in wound care clinic twice per week. She does not check her blood sugars at home she is taking metformin. No change in urinary frequency. Weight is trending up she is over due for retinopathy screening    HM: due for CRC screening, mammogram and flu shot    Review of Systems   Constitutional:  Negative for activity change, appetite change, fatigue, fever and unexpected weight change.   HENT:  Negative for ear pain, rhinorrhea and sore throat.    Eyes:  Negative for discharge and visual disturbance.   Respiratory:  Negative for chest tightness, shortness of breath and wheezing.    Cardiovascular:  Negative for chest pain, palpitations and leg swelling.   Gastrointestinal:  Negative for abdominal pain, constipation and diarrhea.   Endocrine: Negative for cold intolerance and heat intolerance.   Genitourinary:  Negative for dysuria and hematuria.   Musculoskeletal:  Negative for joint swelling and neck stiffness.   Skin:  Negative for rash.   Neurological:  Negative for dizziness, syncope, weakness and headaches.   Psychiatric/Behavioral:  Negative for suicidal ideas.      Objective:     Vitals:    09/27/22 0900   BP: 130/80   BP Location: Left arm   Patient Position: Sitting   BP Method: Large (Manual)   Pulse: 78   Temp: 97.7 °F (36.5 °C)   TempSrc: Oral   SpO2: 99%   Weight: 111.2 kg (245 lb 2.4 oz)   Height: 5' 6" (1.676 m)          Physical Exam  Constitutional:       Appearance: She is well-developed. She is obese.   HENT:      Head: Normocephalic and " atraumatic.   Eyes:      Conjunctiva/sclera: Conjunctivae normal.   Cardiovascular:      Rate and Rhythm: Normal rate and regular rhythm.      Heart sounds: No murmur heard.    No friction rub. No gallop.   Pulmonary:      Effort: Pulmonary effort is normal.      Breath sounds: Normal breath sounds. No wheezing or rales.   Abdominal:      Palpations: Abdomen is soft.   Musculoskeletal:         General: No tenderness. Normal range of motion.      Cervical back: Normal range of motion.   Skin:     General: Skin is warm and dry.      Comments: Left foot is wrapped she is using darco shoe on right as well   Neurological:      Mental Status: She is alert and oriented to person, place, and time.      Cranial Nerves: No cranial nerve deficit.   Psychiatric:      Comments: Pressured speech but redirectable       Assessment and Plan   1. Type 2 diabetes mellitus with foot ulcer, without long-term current use of insulin  Start glp1 agonist to target imprved glycemic control continue metformin    2. Type 2 diabetes mellitus with diabetic polyneuropathy, without long-term current use of insulin  Referral for DFE  - Ambulatory referral/consult to Optometry; Future  - semaglutide (OZEMPIC) 1 mg/dose (4 mg/3 mL); Inject 1 mg into the skin every 7 days.  Dispense: 1 pen; Refill: 2  - CBC Auto Differential; Future  - Comprehensive Metabolic Panel; Future  - Hemoglobin A1C; Future    3. Chronic deep vein thrombosis (DVT) of proximal vein of lower extremity, unspecified laterality  Continue NOAC    4. Long term (current) use of anticoagulants  As above    5. Screening for colon cancer  Referral for cscope  - Ambulatory referral/consult to Endo Procedure ; Future    6. Encounter for screening mammogram for malignant neoplasm of breast  mammogram  - Mammo Digital Screening Bilat w/ Phil; Future    7. Chronic obstructive pulmonary disease, unspecified COPD type  Prn albuterol she is contemplative on smoking cessation  -  albuterol (PROVENTIL HFA) 90 mcg/actuation inhaler; Inhale 2 puffs into the lungs every 6 (six) hours as needed for Wheezing. Rescue  Dispense: 18 g; Refill: 0    8. Tinea  Topical azole powder daily  - miconazole nitrate, bulk, Powd; Apply to clean dry skin twice daily  Dispense: 100 g; Refill: 0    9. Need for influenza vaccination  Flu vaccine todayh  - Influenza - Quadrivalent *Preferred* (6 months+) (PF)    10. Gastroesophageal reflux disease with esophagitis without hemorrhage  Daily ppi  - pantoprazole (PROTONIX) 40 MG tablet; Take 1 tablet (40 mg total) by mouth once daily.  Dispense: 90 tablet; Refill: 1

## 2022-09-29 NOTE — ASSESSMENT & PLAN NOTE
- continue Eliquis 5 mg BID, ASA 81 mg QD, pravastatin 40 mg QHS   Spoke with Stefani, informed her we did not receive a referral.  Gave her the fax number to send it.

## 2022-09-30 ENCOUNTER — HOSPITAL ENCOUNTER (OUTPATIENT)
Dept: WOUND CARE | Facility: HOSPITAL | Age: 52
Discharge: HOME | End: 2022-09-30
Attending: PODIATRIST
Payer: MEDICAID

## 2022-09-30 VITALS
RESPIRATION RATE: 20 BRPM | TEMPERATURE: 98 F | DIASTOLIC BLOOD PRESSURE: 63 MMHG | HEART RATE: 80 BPM | SYSTOLIC BLOOD PRESSURE: 137 MMHG

## 2022-09-30 DIAGNOSIS — L97.512 RIGHT FOOT ULCER, WITH FAT LAYER EXPOSED: ICD-10-CM

## 2022-09-30 DIAGNOSIS — M14.672 CHARCOT'S JOINT OF LEFT FOOT: Primary | ICD-10-CM

## 2022-09-30 DIAGNOSIS — L97.423 LEFT MIDFOOT ULCER, WITH NECROSIS OF MUSCLE: ICD-10-CM

## 2022-09-30 PROCEDURE — 11042 DBRDMT SUBQ TIS 1ST 20SQCM/<: CPT | Mod: ,,, | Performed by: PODIATRIST

## 2022-09-30 PROCEDURE — 11042 WOUND DEBRIDEMENT: ICD-10-PCS | Mod: ,,, | Performed by: PODIATRIST

## 2022-09-30 PROCEDURE — 99499 NO LOS: ICD-10-PCS | Mod: ,,, | Performed by: PODIATRIST

## 2022-09-30 PROCEDURE — 11042 DBRDMT SUBQ TIS 1ST 20SQCM/<: CPT | Performed by: PODIATRIST

## 2022-09-30 PROCEDURE — 99499 UNLISTED E&M SERVICE: CPT | Mod: ,,, | Performed by: PODIATRIST

## 2022-09-30 NOTE — PROGRESS NOTES
"[facility]                Progress Note    Subjective:       Patient ID: Audrey Natarajan is a 52 y.o. female.    Chief Complaint: Wound Check    F/u wound care visit. Patient ambulatory to exam room wearing bilateral darco shoes as ordered. Patient states that previous wound site on right plantar first toe has opened again, states she was cleaning it and the skin "just came off". Wound noted to right plantar first toe, no dressing on wound and no active drainage noted. Wound to right plantar toe measuring 1 x 0.7 x 0.2cm with pink wound bed and pale white periwound. Wound dressing to left foot intact with large amount of serosanguineous, malodorous drainage noted. Wound to left plantar foot measurements unchanged from previous wound care visit. Patient also states she's been changing wound dressing to left foot as ordered but her home supplies never arrived.     Review of Systems   Constitutional:  Positive for activity change and fatigue. Negative for appetite change, chills and fever.   Respiratory:  Negative for cough and shortness of breath.    Cardiovascular:  Positive for leg swelling. Negative for chest pain.   Gastrointestinal:  Negative for diarrhea, nausea and vomiting.   Musculoskeletal:  Positive for arthralgias and myalgias.   Skin:  Positive for color change and wound.   Neurological:  Positive for numbness. Negative for weakness.        + paresthesia        Objective:        Physical Exam  Nursing note reviewed.   Constitutional:       General: She is not in acute distress.     Appearance: She is not toxic-appearing or diaphoretic.   Cardiovascular:      Pulses:           Dorsalis pedis pulses are 1+ on the right side and 1+ on the left side.        Posterior tibial pulses are 2+ on the right side and 2+ on the left side.   Pulmonary:      Effort: No respiratory distress.   Musculoskeletal:      Right lower leg: Edema present.      Left lower leg: " Edema present.      Right ankle: Swelling present. No lateral malleolus, medial malleolus, AITF ligament, CF ligament or posterior TF ligament tenderness. Decreased range of motion.      Right Achilles Tendon: No defects. Paniagua's test negative.      Left ankle: Swelling present. No lateral malleolus, medial malleolus, AITF ligament, CF ligament, posterior TF ligament or proximal fibula tenderness. Decreased range of motion.      Left Achilles Tendon: No defects. Paniagua's test negative.      Right foot: Swelling and tenderness present.      Left foot: Swelling, Charcot foot and tenderness present.      Comments: There is equinus deformity bilateral with decreased dorsiflexion at the ankle joint bilateral    Decreased first MPJ range of motion both weightbearing and nonweightbearing, no crepitus observed the first MP joint, + dorsal flag sign. Mild  bunion deformity is observed .    Patient has hammertoes of digits 2-5 bilateral partially reducible without symptom today.     Skin:     General: Skin is warm and dry.      Coloration: Skin is not pale.      Findings: Erythema and wound (see below) present. No laceration.      Nails: There is no clubbing.   Neurological:      Sensory: No sensory deficit.      Motor: No tremor, atrophy or abnormal muscle tone.      Deep Tendon Reflexes: Reflexes are normal and symmetric.      Comments: Paresthesias, and hyperesthesia bilateral feet at toes with no clearly identified trigger or source.    Mundelein-Gilberto 5.07 monofilament is intact bilateral feet. Sharp/dull sensation is also intact Bilateral feet.   Psychiatric:         Attention and Perception: She is attentive.         Mood and Affect: Mood is not anxious. Affect is not inappropriate.         Speech: She is communicative. Speech is not slurred.         Behavior: Behavior is not combative.       Vitals:    09/30/22 1126   BP: 137/63   Pulse: 80   Resp: 20   Temp: 97.7 °F (36.5 °C)       Assessment:            ICD-10-CM ICD-9-CM   1. Charcot's joint of left foot  M14.672 094.0     713.5   2. Left midfoot ulcer, with necrosis of muscle  L97.423 707.14     728.89   3. Right foot ulcer, with fat layer exposed  L97.512 707.15            Altered Skin Integrity 09/30/22 1120 Right plantar Toe, first #2 Diabetic Ulcer Partial thickness tissue loss. Shallow open ulcer with a red or pink wound bed, without slough. Intact or Open/Ruptured Serum-filled blister. (Active)   09/30/22 1120   Altered Skin Integrity Present on Admission:    Side: Right   Orientation: plantar   Location: Toe, first   Wound Number: #2   Is this injury device related?: No   Primary Wound Type: Diabetic ulc   Description of Altered Skin Integrity: Partial thickness tissue loss. Shallow open ulcer with a red or pink wound bed, without slough. Intact or Open/Ruptured Serum-filled blister.   Ankle-Brachial Index:    Pulses:    Removal Indication and Assessment:    Wound Outcome:    (Retired) Wound Length (cm):    (Retired) Wound Width (cm):    (Retired) Depth (cm):    Wound Description (Comments):    Removal Indications:    Wound Image   09/30/22 1120   Description of Altered Skin Integrity Partial thickness tissue loss. Shallow open ulcer with a red or pink wound bed, without slough. Intact or Open/Ruptured Serum-filled blister. 09/30/22 1120   Dressing Appearance No dressing 09/30/22 1120   Appearance Pink 09/30/22 1120   Tissue loss description Partial thickness 09/30/22 1120   Black (%), Wound Tissue Color 0 % 09/30/22 1120   Red (%), Wound Tissue Color 100 % 09/30/22 1120   Yellow (%), Wound Tissue Color 0 % 09/30/22 1120   Periwound Area Pale white 09/30/22 1120   Wound Edges Callused 09/30/22 1120   Wound Length (cm) 1 cm 09/30/22 1120   Wound Width (cm) 0.7 cm 09/30/22 1120   Wound Depth (cm) 0.2 cm 09/30/22 1120   Wound Volume (cm^3) 0.14 cm^3 09/30/22 1120   Wound Surface Area (cm^2) 0.7 cm^2 09/30/22 1120   Care Cleansed with:;Antimicrobial agent;Soap  and water;Sterile normal saline 09/30/22 1120   Dressing Changed 09/30/22 1120   Periwound Care Moisture barrier applied 09/30/22 1120   Compression Tubular elasticized bandage 09/30/22 1120   Off Loading Football dressing;Off loading shoe 09/30/22 1120   Dressing Change Due 10/04/22 09/30/22 1120            Wound 04/15/22 2230 Diabetic Ulcer Left medial;plantar Foot (Active)   04/15/22 2230    Pre-existing: Yes   Primary Wound Type: Diabetic ulc   Side: Left   Orientation: medial;plantar   Location: Foot   Wound Number:    Ankle-Brachial Index:    Pulses:    Removal Indication and Assessment:    Wound Outcome:    (Retired) Wound Type:    (Retired) Wound Length (cm):    (Retired) Wound Width (cm):    (Retired) Depth (cm):    Wound Description (Comments):    Removal Indications:    Wound Image   09/30/22 1120   Wound WDL ex 09/30/22 1120   Dressing Appearance Intact;Moist drainage 09/30/22 1120   Drainage Amount Large 09/30/22 1120   Drainage Characteristics/Odor Serosanguineous;Malodorous 09/30/22 1120   Appearance Red 09/30/22 1120   Tissue loss description Partial thickness 09/30/22 1120   Black (%), Wound Tissue Color 0 % 09/30/22 1120   Red (%), Wound Tissue Color 100 % 09/30/22 1120   Yellow (%), Wound Tissue Color 0 % 09/30/22 1120   Periwound Area Macerated;Pale white 09/30/22 1120   Wound Edges Callused 09/30/22 1120   Wound Length (cm) 3.3 cm 09/30/22 1120   Wound Width (cm) 4.1 cm 09/30/22 1120   Wound Depth (cm) 0.1 cm 09/30/22 1120   Wound Volume (cm^3) 1.353 cm^3 09/30/22 1120   Wound Surface Area (cm^2) 13.53 cm^2 09/30/22 1120   Care Cleansed with:;Antimicrobial agent;Soap and water;Sterile normal saline 09/30/22 1120   Dressing Changed 09/30/22 1120   Periwound Care Moisture barrier applied 09/30/22 1120   Compression Tubular elasticized bandage 09/30/22 1120   Off Loading Football dressing;Off loading shoe 09/30/22 1120   Dressing Change Due 10/04/22 09/30/22 1120           Plan:              Wound  Debridement    Date/Time: 9/30/2022 10:39 AM  Performed by: Maira De Los Santos DPM  Authorized by: Maira De Los Santos DPM       Wound Details:    Location:  Left foot    Location:  Left Midfoot    Type of Debridement:  Excisional       Length (cm):  3.3       Area (sq cm):  13.53       Width (cm):  4.1       Percent Debrided (%):  100       Depth (cm):  0.1       Total Area Debrided (sq cm):  13.53    Depth of debridement:  Subcutaneous tissue    Tissue debrided:  Epidermis, Dermis, Subcutaneous and Hypergranulation    Devitalized tissue debrided:  Biofilm and Callus    Instruments:  Curette    Bleeding:  Minimal  Hemostasis Achieved: Yes    Method Used:  Pressure  Patient tolerance:  Patient tolerated the procedure well with no immediate complications    Patient sent home with a few supplies to change dressing as we wait a call back from home supply company. Wound care done per order. RTC on Friday 10/7/22.      Orders Placed This Encounter   Procedures    Change dressing     BILATERAL FEET: Clean with NS. Vashe soak x10 minutes. Gentian violet to periwound. Endoform/Iodosorb to wound bed (pt forgot antibiotic powder). Mextra short x1. Alex foam long x2. Football dressing (cast padding x3). Tubigrip size E (calf size 42.5cm).     Patient to change on Tuesday then daily: Clean with NS. Apply antibiotic powder to wound bed. Cover with foam border. RTC in a week.        Follow up in about 1 week (around 10/7/2022) for wound care MD visit.

## 2022-10-05 ENCOUNTER — TELEPHONE (OUTPATIENT)
Dept: OPTOMETRY | Facility: CLINIC | Age: 52
End: 2022-10-05
Payer: MEDICAID

## 2022-10-05 NOTE — TELEPHONE ENCOUNTER
Pt unable to get to clinic for refraction and in dire need of new glasses.  Extended with understanding high likelyhood Rx has changed  Eyeglass Final Rx       Eyeglass Final Rx         Sphere Cylinder Axis Add    Right +0.50 +1.00 170 +1.75    Left +0.25 +0.50 175 +1.75      Type: PAL    Expiration Date: 1/5/2023

## 2022-10-07 ENCOUNTER — HOSPITAL ENCOUNTER (OUTPATIENT)
Dept: WOUND CARE | Facility: HOSPITAL | Age: 52
Discharge: HOME | End: 2022-10-07
Attending: FAMILY MEDICINE
Payer: MEDICAID

## 2022-10-07 VITALS — TEMPERATURE: 98 F | HEART RATE: 95 BPM | DIASTOLIC BLOOD PRESSURE: 76 MMHG | SYSTOLIC BLOOD PRESSURE: 147 MMHG

## 2022-10-07 DIAGNOSIS — L97.519: ICD-10-CM

## 2022-10-07 DIAGNOSIS — L97.423 LEFT MIDFOOT ULCER, WITH NECROSIS OF MUSCLE: ICD-10-CM

## 2022-10-07 DIAGNOSIS — M14.672 CHARCOT'S JOINT OF LEFT FOOT: Primary | ICD-10-CM

## 2022-10-07 PROCEDURE — 99214 OFFICE O/P EST MOD 30 MIN: CPT | Mod: ,,, | Performed by: FAMILY MEDICINE

## 2022-10-07 PROCEDURE — 99213 OFFICE O/P EST LOW 20 MIN: CPT | Performed by: FAMILY MEDICINE

## 2022-10-07 PROCEDURE — 99214 PR OFFICE/OUTPT VISIT, EST, LEVL IV, 30-39 MIN: ICD-10-PCS | Mod: ,,, | Performed by: FAMILY MEDICINE

## 2022-10-07 NOTE — PROGRESS NOTES
"[facility]                Progress Note    Subjective:       Patient ID: Audrey Natarajan is a 52 y.o. female.    Chief Complaint: Wound Check and Dressing Change    Follow up wound care visit. Patient ambulated to exam room unaided, wearing Darco shoes on bilateral feet.  She c/o pain to Left Plantar Foot rating as 3/10 at present. Dressing intact, patient reports cleaning/changing as ordered daily, with last change yesterday evening. She received her home supply order. Right 1st Plantar Toe wound has a moderate amount of serous non-malodor drainage, Left Plantar Foot has a large amount of serous, malodor drainage. Right 1st Plantar Toe wound measuring smaller in (0.4 cm) length, but larger in (0.2 cm) width. Left Plantar Foot wound measuring larger in (0.5 cm) width.    Wound care done as per order. Patient to keep current dressing on again until Tuesday, then daily changes starting Tuesday. Return to clinic in 1 week.     MD note:  patient following up today for bilateral DFU's.  She states "I don't understand why they are taking so long to heal".  She has no pain at this time.  No fevers, chills, or sweats.        Review of Systems   Constitutional: Negative.    HENT: Negative.     Eyes: Negative.    Respiratory: Negative.     Cardiovascular: Negative.    Gastrointestinal: Negative.    Skin:  Positive for wound.   Psychiatric/Behavioral: Negative.         Objective:        Physical Exam  Constitutional:       Appearance: Normal appearance.   HENT:      Head: Normocephalic and atraumatic.      Right Ear: External ear normal.      Left Ear: External ear normal.      Mouth/Throat:      Mouth: Mucous membranes are moist.      Pharynx: Oropharynx is clear.   Eyes:      Extraocular Movements: Extraocular movements intact.      Conjunctiva/sclera: Conjunctivae normal.      Pupils: Pupils are equal, round, and reactive to light.   Cardiovascular:      Rate and Rhythm: " Normal rate and regular rhythm.   Pulmonary:      Effort: Pulmonary effort is normal. No respiratory distress.   Skin:     General: Skin is warm and dry.      Comments: +small DFU to right foot with new epithelialization  +large plantar DFU to left foot with maceration, but no slough or erythema to periwound   Neurological:      Mental Status: She is alert and oriented to person, place, and time. Mental status is at baseline.       Vitals:    10/07/22 1001   BP: (!) 147/76   Pulse: 95   Temp: 97.5 °F (36.4 °C)       Assessment:           ICD-10-CM ICD-9-CM   1. Charcot's joint of left foot  M14.672 094.0     713.5   2. Left midfoot ulcer, with necrosis of muscle  L97.423 707.14     728.89   3. Chronic ulcer of right great toe  L97.519 707.15            Altered Skin Integrity 09/30/22 1120 Right plantar Toe, first #2 Diabetic Ulcer Partial thickness tissue loss. Shallow open ulcer with a red or pink wound bed, without slough. Intact or Open/Ruptured Serum-filled blister. (Active)   09/30/22 1120   Altered Skin Integrity Present on Admission:    Side: Right   Orientation: plantar   Location: Toe, first   Wound Number: #2   Is this injury device related?: No   Primary Wound Type: Diabetic [facility]   Description of Altered Skin Integrity: Partial thickness tissue loss. Shallow open ulcer with a red or pink wound bed, without slough. Intact or Open/Ruptured Serum-filled blister.   Ankle-Brachial Index:    Pulses:    Removal Indication and Assessment:    Wound Outcome:    (Retired) Wound Length (cm):    (Retired) Wound Width (cm):    (Retired) Depth (cm):    Wound Description (Comments):    Removal Indications:    Wound Image   10/07/22 1003   Description of Altered Skin Integrity Partial thickness tissue loss. Shallow open ulcer with a red or pink wound bed, without slough. Intact or Open/Ruptured Serum-filled blister. 10/07/22 1003   Dressing Appearance Intact;Moist drainage 10/07/22 1003   Drainage Amount Moderate 10/07/22  1003   Drainage Characteristics/Odor Serous;No odor 10/07/22 1003   Appearance Pink;Moist 10/07/22 1003   Tissue loss description Partial thickness 10/07/22 1003   Black (%), Wound Tissue Color 0 % 10/07/22 1003   Red (%), Wound Tissue Color 100 % 10/07/22 1003   Yellow (%), Wound Tissue Color 0 % 10/07/22 1003   Periwound Area Macerated;Pale white 10/07/22 1003   Wound Edges Defined;Open 10/07/22 1003   Wound Length (cm) 0.6 cm 10/07/22 1003   Wound Width (cm) 0.9 cm 10/07/22 1003   Wound Depth (cm) 0.2 cm 10/07/22 1003   Wound Volume (cm^3) 0.108 cm^3 10/07/22 1003   Wound Surface Area (cm^2) 0.54 cm^2 10/07/22 1003   Tunneling (depth (cm)/location) 0 10/07/22 1003   Undermining (depth (cm)/location) 0 10/07/22 1003   Care Cleansed with:;Antimicrobial agent;Sterile normal saline 10/07/22 1003   Dressing Changed 10/07/22 1003   Periwound Care Moisture barrier applied 10/07/22 1003   Off Loading Football dressing;Off loading shoe 10/07/22 1003   Dressing Change Due 10/14/22 10/07/22 1003            Wound 04/15/22 2230 Diabetic Ulcer Left medial;plantar Foot (Active)   04/15/22 2230    Pre-existing: Yes   Primary Wound Type: Diabetic ulc   Side: Left   Orientation: medial;plantar   Location: Foot   Wound Number:    Ankle-Brachial Index:    Pulses:    Removal Indication and Assessment:    Wound Outcome:    (Retired) Wound Type:    (Retired) Wound Length (cm):    (Retired) Wound Width (cm):    (Retired) Depth (cm):    Wound Description (Comments):    Removal Indications:    Wound Image   10/07/22 1003   Wound WDL ex 10/07/22 1003   Dressing Appearance Intact;Moist drainage 10/07/22 1003   Drainage Amount Large 10/07/22 1003   Drainage Characteristics/Odor Serous;Malodorous 10/07/22 1003   Appearance Red;Moist 10/07/22 1003   Tissue loss description Partial thickness 10/07/22 1003   Black (%), Wound Tissue Color 0 % 10/07/22 1003   Red (%), Wound Tissue Color 100 % 10/07/22 1003   Yellow (%), Wound Tissue Color 0 %  10/07/22 1003   Periwound Area Pale white;Macerated 10/07/22 1003   Wound Edges Defined;Open 10/07/22 1003   Wound Length (cm) 3.3 cm 10/07/22 1003   Wound Width (cm) 4.6 cm 10/07/22 1003   Wound Depth (cm) 0.1 cm 10/07/22 1003   Wound Volume (cm^3) 1.518 cm^3 10/07/22 1003   Wound Surface Area (cm^2) 15.18 cm^2 10/07/22 1003   Tunneling (depth (cm)/location) 0 10/07/22 1003   Undermining (depth (cm)/location) 0 10/07/22 1003   Care Cleansed with:;Antimicrobial agent;Sterile normal saline;Wound cleanser 10/07/22 1003   Dressing Changed 10/07/22 1003   Periwound Care Moisture barrier applied 10/07/22 1003   Compression Tubular elasticized bandage 10/07/22 1003   Off Loading Football dressing;Off loading shoe 10/07/22 1003   Dressing Change Due 10/14/22 10/07/22 1003           Plan:                Orders Placed This Encounter   Procedures    Change dressing     BILATERAL FEET: Clean with NS. Vashe soak x10 minutes. Cavilon to areas that will be taped. Gentian violet to periwound. Antibiotic powder applied to wound beds (cefepime, patient's own). Mextra short x1, secure with paper tape. Alex foam long x2. Football dressing (cast padding x3). Tubigrip size E (calf size 42.5cm).     Patient to change on Tuesday then daily: Clean with NS. Apply antibiotic powder to wound bed. Cover with foam border. Return to clinic in 1 week.        Follow up in about 1 week (around 10/14/2022) for wound care.       Aiden Oleary MD

## 2022-10-10 ENCOUNTER — PATIENT MESSAGE (OUTPATIENT)
Dept: ADMINISTRATIVE | Facility: HOSPITAL | Age: 52
End: 2022-10-10
Payer: MEDICAID

## 2022-10-14 ENCOUNTER — HOSPITAL ENCOUNTER (OUTPATIENT)
Dept: WOUND CARE | Facility: HOSPITAL | Age: 52
Discharge: HOME | End: 2022-10-14
Attending: PODIATRIST
Payer: MEDICAID

## 2022-10-14 VITALS — TEMPERATURE: 98 F | DIASTOLIC BLOOD PRESSURE: 63 MMHG | HEART RATE: 85 BPM | SYSTOLIC BLOOD PRESSURE: 132 MMHG

## 2022-10-14 DIAGNOSIS — S91.302A OPEN WOUND OF LEFT FOOT: Primary | ICD-10-CM

## 2022-10-14 DIAGNOSIS — L97.509 DIABETIC FOOT ULCER ASSOCIATED WITH TYPE 2 DIABETES MELLITUS: ICD-10-CM

## 2022-10-14 DIAGNOSIS — E11.621 DIABETIC FOOT ULCER ASSOCIATED WITH TYPE 2 DIABETES MELLITUS: ICD-10-CM

## 2022-10-14 DIAGNOSIS — L97.423 LEFT MIDFOOT ULCER, WITH NECROSIS OF MUSCLE: ICD-10-CM

## 2022-10-14 PROCEDURE — 99499 NO LOS: ICD-10-PCS | Mod: ,,, | Performed by: PODIATRIST

## 2022-10-14 PROCEDURE — 11042 DBRDMT SUBQ TIS 1ST 20SQCM/<: CPT | Performed by: PODIATRIST

## 2022-10-14 PROCEDURE — 99499 UNLISTED E&M SERVICE: CPT | Mod: ,,, | Performed by: PODIATRIST

## 2022-10-14 PROCEDURE — 11042 DBRDMT SUBQ TIS 1ST 20SQCM/<: CPT | Mod: ,,, | Performed by: PODIATRIST

## 2022-10-14 PROCEDURE — 11042 WOUND DEBRIDEMENT: ICD-10-PCS | Mod: ,,, | Performed by: PODIATRIST

## 2022-10-14 NOTE — PROGRESS NOTES
[facility]                Progress Note    Subjective:       Patient ID: Audrey Natarajan is a 52 y.o. female.    Chief Complaint: Wound Check    Walked to clinic unaccompanied and unaided. Left foot had bordered gauze with coban covering, was changed this am and had mod amt serous drainage. State schanges bordered gauze twice a day after removal of football on Tuesdays. Both wounds smaller. States fishy odor when changes dsg.    Review of Systems   Constitutional:  Positive for activity change and fatigue. Negative for appetite change, chills and fever.   Respiratory:  Negative for cough and shortness of breath.    Cardiovascular:  Positive for leg swelling. Negative for chest pain.   Gastrointestinal:  Negative for diarrhea, nausea and vomiting.   Musculoskeletal:  Positive for arthralgias and myalgias.   Skin:  Positive for color change and wound.   Neurological:  Positive for numbness. Negative for weakness.        + paresthesia        Objective:        Physical Exam  Nursing note reviewed.   Constitutional:       General: She is not in acute distress.     Appearance: She is not toxic-appearing or diaphoretic.   Cardiovascular:      Pulses:           Dorsalis pedis pulses are 1+ on the right side and 1+ on the left side.        Posterior tibial pulses are 2+ on the right side and 2+ on the left side.   Pulmonary:      Effort: No respiratory distress.   Musculoskeletal:      Right lower leg: Edema present.      Left lower leg: Edema present.      Right ankle: Swelling present. No lateral malleolus, medial malleolus, AITF ligament, CF ligament or posterior TF ligament tenderness. Decreased range of motion.      Right Achilles Tendon: No defects. Paniagua's test negative.      Left ankle: Swelling present. No lateral malleolus, medial malleolus, AITF ligament, CF ligament, posterior TF ligament or proximal fibula tenderness. Decreased range of motion.      Left  Achilles Tendon: No defects. Paniagua's test negative.      Right foot: Swelling and tenderness present.      Left foot: Swelling, Charcot foot and tenderness present.      Comments: There is equinus deformity bilateral with decreased dorsiflexion at the ankle joint bilateral    Decreased first MPJ range of motion both weightbearing and nonweightbearing, no crepitus observed the first MP joint, + dorsal flag sign. Mild  bunion deformity is observed .    Patient has hammertoes of digits 2-5 bilateral partially reducible without symptom today.     Skin:     General: Skin is warm and dry.      Coloration: Skin is not pale.      Findings: Erythema and wound (see below) present. No laceration.      Nails: There is no clubbing.   Neurological:      Sensory: No sensory deficit.      Motor: No tremor, atrophy or abnormal muscle tone.      Deep Tendon Reflexes: Reflexes are normal and symmetric.      Comments: Paresthesias, and hyperesthesia bilateral feet at toes with no clearly identified trigger or source.    [place]-Gilberto 5.07 monofilament is intact bilateral feet. Sharp/dull sensation is also intact Bilateral feet.   Psychiatric:         Attention and Perception: She is attentive.         Mood and Affect: Mood is not anxious. Affect is not inappropriate.         Speech: She is communicative. Speech is not slurred.         Behavior: Behavior is not combative.       Vitals:    10/14/22 1104   BP: 132/63   Pulse: 85   Temp: 98.1 °F (36.7 °C)       Assessment:           ICD-10-CM ICD-9-CM   1. Open wound of left foot  S91.302A 892.0   2. Diabetic foot ulcer associated with type 2 diabetes mellitus  E11.621 250.80    L97.509 707.15   3. Left midfoot ulcer, with necrosis of muscle  L97.423 707.14     728.89            Wound 04/15/22 2230 Diabetic Ulcer Left medial;plantar Foot (Active)   04/15/22 2230    Pre-existing: Yes   Primary Wound Type: Diabetic ulc   Side: Left   Orientation: medial;plantar   Location: Foot   Wound  Number:    Ankle-Brachial Index:    Pulses:    Removal Indication and Assessment:    Wound Outcome:    (Retired) Wound Type:    (Retired) Wound Length (cm):    (Retired) Wound Width (cm):    (Retired) Depth (cm):    Wound Description (Comments):    Removal Indications:    Wound Image   10/14/22 1119   Wound WDL ex 10/14/22 1119   Dressing Appearance Moist drainage 10/14/22 1119   Drainage Amount Moderate 10/14/22 1119   Appearance Pink 10/14/22 1119   Tissue loss description Full thickness 10/14/22 1119   Red (%), Wound Tissue Color 100 % 10/14/22 1119   Periwound Area Intact;Dry 10/14/22 1119   Wound Edges Defined 10/14/22 1119   Wound Length (cm) 3.5 cm 10/14/22 1119   Wound Width (cm) 3.9 cm 10/14/22 1119   Wound Depth (cm) 0.1 cm 10/14/22 1119   Wound Volume (cm^3) 1.365 cm^3 10/14/22 1119   Wound Surface Area (cm^2) 13.65 cm^2 10/14/22 1119   Care Cleansed with:;Antimicrobial agent;Sterile normal saline 10/14/22 1119   Dressing Changed 10/14/22 1119   Compression Two layer compression 10/14/22 1119   Off Loading Football dressing;Off loading shoe 10/14/22 1119       [REMOVED]      Altered Skin Integrity 09/30/22 1120 Right plantar Toe, first #2 Diabetic Ulcer Partial thickness tissue loss. Shallow open ulcer with a red or pink wound bed, without slough. Intact or Open/Ruptured Serum-filled blister. (Removed)   09/30/22 1120   Altered Skin Integrity Present on Admission:    Side: Right   Orientation: plantar   Location: Toe, first   Wound Number: #2   Is this injury device related?: No   Primary Wound Type: Diabetic ulc   Description of Altered Skin Integrity: Partial thickness tissue loss. Shallow open ulcer with a red or pink wound bed, without slough. Intact or Open/Ruptured Serum-filled blister.   Ankle-Brachial Index:    Pulses:    Removal Indication and Assessment:    Wound Outcome: Healed   (Retired) Wound Length (cm):    (Retired) Wound Width (cm):    (Retired) Depth (cm):    Wound Description  (Comments):    Removal Indications:    Removed 10/28/22    Wound Image   10/14/22 1119   Description of Altered Skin Integrity Full thickness tissue loss. Subcutaneous fat may be visible but bone, tendon or muscle are not exposed 10/14/22 1119   Dressing Appearance Dry;Intact 10/14/22 1119   Drainage Amount Small 10/14/22 1119   Drainage Characteristics/Odor Serous 10/14/22 1119   Appearance Pink 10/14/22 1119   Tissue loss description Full thickness 10/14/22 1119   Red (%), Wound Tissue Color 100 % 10/14/22 1119   Periwound Area Dry 10/14/22 1119   Wound Edges Defined 10/14/22 1119   Wound Length (cm) 0.5 cm 10/14/22 1119   Wound Width (cm) 0.5 cm 10/14/22 1119   Wound Depth (cm) 0.2 cm 10/14/22 1119   Wound Volume (cm^3) 0.05 cm^3 10/14/22 1119   Wound Surface Area (cm^2) 0.25 cm^2 10/14/22 1119   Care Cleansed with:;Antimicrobial agent;Sterile normal saline 10/14/22 1119   Dressing Changed 10/14/22 1119   Compression Tubular elasticized bandage 10/14/22 1119   Off Loading Football dressing;Off loading shoe 10/14/22 1119   Dressing Change Due 10/21/22 10/14/22 1119           Plan:            DR De Los Santos recommended distilled water and vinager 50/50 mix to clean. Will try TLC on left leg as it is swollen and no tubigrip worn.    Wound Debridement    Date/Time: 10/14/2022 10:45 AM  Performed by: Maira De Los Santos DPM  Authorized by: Maira De Los Santos DPM     Consent Done?:  Yes (Written)    Wound Details:    Location:  Left foot    Location:  Left Midfoot    Type of Debridement:  Excisional       Length (cm):  3.5       Area (sq cm):  13.65       Width (cm):  3.9       Percent Debrided (%):  100       Depth (cm):  0.1       Total Area Debrided (sq cm):  13.65    Depth of debridement:  Subcutaneous tissue    Tissue debrided:  Dermis, Epidermis and Subcutaneous    Devitalized tissue debrided:  Fibrin, Callus and Biofilm    Instruments:  Curette    Bleeding:  Minimal  Hemostasis Achieved: Yes    Method Used:  Pressure  Patient  tolerance:  Patient tolerated the procedure well with no immediate complications      Orders Placed This Encounter   Procedures    Debridement     This order was created via procedure documentation     Standing Status:   Standing     Number of Occurrences:   1    Change dressing     Right foot: Clean with NS. Vashe soak x10 minutes. Cavilon to areas that will be taped. Gentian violet to periwound. Antibiotic powder applied to wound beds (cefepime, patient's own). Mextra short x1, secure with paper tape. Alex foam long x2. Football dressing (cast padding x3). Tubigrip size E (calf size 42.5cm).   Left foot Endoform Iodosorb abx powder Mextra and 2 Javan foam with TLC    Patient to change on Tuesday then daily: Clean with NS. Apply antibiotic powder to wound bed. Cover with foam border. Return to clinic in 1 week.        Follow up in about 1 week (around 10/21/2022).

## 2022-10-19 DIAGNOSIS — E11.42 TYPE 2 DIABETES MELLITUS WITH DIABETIC POLYNEUROPATHY, WITHOUT LONG-TERM CURRENT USE OF INSULIN: ICD-10-CM

## 2022-10-19 DIAGNOSIS — J44.9 CHRONIC OBSTRUCTIVE PULMONARY DISEASE, UNSPECIFIED COPD TYPE: ICD-10-CM

## 2022-10-19 DIAGNOSIS — I10 ESSENTIAL HYPERTENSION: Chronic | ICD-10-CM

## 2022-10-19 DIAGNOSIS — B35.9 TINEA: ICD-10-CM

## 2022-10-19 DIAGNOSIS — K21.00 GASTROESOPHAGEAL REFLUX DISEASE WITH ESOPHAGITIS WITHOUT HEMORRHAGE: ICD-10-CM

## 2022-10-19 RX ORDER — METFORMIN HYDROCHLORIDE 1000 MG/1
1000 TABLET ORAL 2 TIMES DAILY WITH MEALS
Qty: 180 TABLET | Refills: 1 | Status: SHIPPED | OUTPATIENT
Start: 2022-10-19 | End: 2023-08-02

## 2022-10-19 RX ORDER — PANTOPRAZOLE SODIUM 40 MG/1
40 TABLET, DELAYED RELEASE ORAL DAILY
Qty: 90 TABLET | Refills: 1 | Status: SHIPPED | OUTPATIENT
Start: 2022-10-19 | End: 2023-02-06 | Stop reason: SDUPTHER

## 2022-10-19 NOTE — TELEPHONE ENCOUNTER
No new care gaps identified.  [facility] Embedded Care Gaps. Reference number: [id number]. 10/19/2022   5:16:57 PM CDT

## 2022-10-19 NOTE — TELEPHONE ENCOUNTER
----- Message from Ysabel Mcclellan sent at 10/19/2022  9:09 AM CDT -----  Regarding: Refill  Who Called:SABIHA DUNNE [[id number]]        Refill or New Rx: Refill        RX Name and Strength 1. pantoprazole (PROTONIX) 40 MG tablet  2. metFORMIN (GLUCOPHAGE) 1000 MG tablet        Is this a 30 day or 90 day RX: 1. 90  2. 180        Preferred Pharmacy with phone number: [id number] - [facility] - [address] [place] [address] AT [facility]          Local or Mail Order: Local          Would the patient rather a call back or a response via [facility]? No        Best Call Back Number:[phone]        Additional Information:

## 2022-10-20 RX ORDER — METOPROLOL TARTRATE 50 MG/1
50 TABLET ORAL 2 TIMES DAILY
Qty: 180 TABLET | Refills: 2 | Status: SHIPPED | OUTPATIENT
Start: 2022-10-20 | End: 2023-01-04

## 2022-10-20 RX ORDER — ALBUTEROL SULFATE 90 UG/1
AEROSOL, METERED RESPIRATORY (INHALATION)
Qty: 6.7 G | Refills: 2 | Status: SHIPPED | OUTPATIENT
Start: 2022-10-20

## 2022-10-20 RX ORDER — NYSTATIN 100000 [USP'U]/G
POWDER TOPICAL
Qty: 60 G | Refills: 0 | Status: SHIPPED | OUTPATIENT
Start: 2022-10-20 | End: 2022-12-17

## 2022-10-20 NOTE — TELEPHONE ENCOUNTER
----- Message from Lilly John sent at 10/20/2022 10:03 AM CDT -----  .Type: Patient Call Back    Who called:self    What is the request in detail:metoprolol tartrate (LOPRESSOR) 50 MG tablet needs to be refilled. Also powder that was prescribed is not covered by insurance so an alternative is needed. Please call  .  [id number] [address] AT [address] [address]  Phone: [phone] Fax: [phone]            Can the clinic reply by [facility]?    Would the patient rather a call back or a response via [facility]? call    Best call back number:.[phone] (home)

## 2022-10-20 NOTE — TELEPHONE ENCOUNTER
Refill Routing Note   Medication(s) are not appropriate for processing by [facility] for the following reason(s):      - Medication is a new start (<3 months)    [facility] action(s):  Defer       Medication Therapy Plan: New start (9/27/22); Defer  Medication reconciliation completed: No     Appointments  past 12m or future 3m with PCP    Date Provider   Last Visit   9/27/2022 Donaldo Pena MD   Next Visit   1/4/2023 Donaldo Pena MD   ED visits in past 90 days: 2        Note composed:10:16 PM 10/19/2022

## 2022-10-21 ENCOUNTER — HOSPITAL ENCOUNTER (OUTPATIENT)
Dept: WOUND CARE | Facility: HOSPITAL | Age: 52
Discharge: HOME | End: 2022-10-21
Attending: PODIATRIST
Payer: MEDICAID

## 2022-10-21 VITALS
RESPIRATION RATE: 20 BRPM | TEMPERATURE: 98 F | DIASTOLIC BLOOD PRESSURE: 56 MMHG | HEART RATE: 79 BPM | SYSTOLIC BLOOD PRESSURE: 116 MMHG

## 2022-10-21 DIAGNOSIS — L97.519: ICD-10-CM

## 2022-10-21 DIAGNOSIS — E11.621 DIABETIC FOOT ULCER ASSOCIATED WITH TYPE 2 DIABETES MELLITUS: ICD-10-CM

## 2022-10-21 DIAGNOSIS — L97.509 DIABETIC FOOT ULCER ASSOCIATED WITH TYPE 2 DIABETES MELLITUS: ICD-10-CM

## 2022-10-21 DIAGNOSIS — S91.302A OPEN WOUND OF LEFT FOOT: Primary | ICD-10-CM

## 2022-10-21 PROCEDURE — 29581 APPL MULTLAYER CMPRN SYS LEG: CPT

## 2022-10-21 PROCEDURE — 99213 OFFICE O/P EST LOW 20 MIN: CPT | Mod: ,,, | Performed by: PODIATRIST

## 2022-10-21 PROCEDURE — 99213 PR OFFICE/OUTPT VISIT, EST, LEVL III, 20-29 MIN: ICD-10-PCS | Mod: ,,, | Performed by: PODIATRIST

## 2022-10-21 NOTE — PROGRESS NOTES
[facility]                Progress Note    Subjective:       Patient ID: Audrey Natarajan is a 52 y.o. female.    Chief Complaint: Wound Check    F/u wound care visit. Patient ambulatory to exam room with bilateral darco shoes on as ordered. Patient c/o 8/10 pain to left foot. Wound dressing to right foot intact with small amount of drainage noted and wound to right plantar first toe measuring smaller. Wound dressing to LLE intact with moderate amount of drainage noted with plantar foot wound measuring larger.     Review of Systems   Constitutional:  Positive for activity change and fatigue. Negative for appetite change, chills and fever.   Respiratory:  Negative for cough and shortness of breath.    Cardiovascular:  Positive for leg swelling. Negative for chest pain.   Gastrointestinal:  Negative for diarrhea, nausea and vomiting.   Musculoskeletal:  Positive for arthralgias and myalgias.   Skin:  Positive for color change and wound.   Neurological:  Positive for numbness. Negative for weakness.        + paresthesia        Objective:        Physical Exam  Nursing note reviewed.   Constitutional:       General: She is not in acute distress.     Appearance: She is not toxic-appearing or diaphoretic.   Cardiovascular:      Pulses:           Dorsalis pedis pulses are 1+ on the right side and 1+ on the left side.        Posterior tibial pulses are 2+ on the right side and 2+ on the left side.   Pulmonary:      Effort: No respiratory distress.   Musculoskeletal:      Right lower leg: Edema present.      Left lower leg: Edema present.      Right ankle: Swelling present. No lateral malleolus, medial malleolus, AITF ligament, CF ligament or posterior TF ligament tenderness. Decreased range of motion.      Right Achilles Tendon: No defects. Paniagua's test negative.      Left ankle: Swelling present. No lateral malleolus, medial malleolus, AITF ligament, CF ligament,  posterior TF ligament or proximal fibula tenderness. Decreased range of motion.      Left Achilles Tendon: No defects. Paniagua's test negative.      Right foot: Swelling and tenderness present.      Left foot: Swelling, Charcot foot and tenderness present.      Comments: There is equinus deformity bilateral with decreased dorsiflexion at the ankle joint bilateral    Decreased first MPJ range of motion both weightbearing and nonweightbearing, no crepitus observed the first MP joint, + dorsal flag sign. Mild  bunion deformity is observed .    Patient has hammertoes of digits 2-5 bilateral partially reducible without symptom today.     Skin:     General: Skin is warm and dry.      Coloration: Skin is not pale.      Findings: Erythema and wound (see below) present. No laceration.      Nails: There is no clubbing.   Neurological:      Sensory: No sensory deficit.      Motor: No tremor, atrophy or abnormal muscle tone.      Deep Tendon Reflexes: Reflexes are normal and symmetric.      Comments: Paresthesias, and hyperesthesia bilateral feet at toes with no clearly identified trigger or source.    [place]-Gilberto 5.07 monofilament is intact bilateral feet. Sharp/dull sensation is also intact Bilateral feet.   Psychiatric:         Attention and Perception: She is attentive.         Mood and Affect: Mood is not anxious. Affect is not inappropriate.         Speech: She is communicative. Speech is not slurred.         Behavior: Behavior is not combative.       Vitals:    10/21/22 1109   BP: (!) 116/56   Pulse: 79   Resp: 20   Temp: 98.1 °F (36.7 °C)       Assessment:           ICD-10-CM ICD-9-CM   1. Open wound of left foot  S91.302A 892.0   2. Diabetic foot ulcer associated with type 2 diabetes mellitus  E11.621 250.80    L97.509 707.15   3. Chronic ulcer of right great toe  L97.519 707.15            Altered Skin Integrity 09/30/22 1120 Right plantar Toe, first #2 Diabetic Ulcer Partial thickness tissue loss. Shallow open  ulcer with a red or pink wound bed, without slough. Intact or Open/Ruptured Serum-filled blister. (Active)   09/30/22 1120   Altered Skin Integrity Present on Admission:    Side: Right   Orientation: plantar   Location: Toe, first   Wound Number: #2   Is this injury device related?: No   Primary Wound Type: Diabetic [facility]   Description of Altered Skin Integrity: Partial thickness tissue loss. Shallow open ulcer with a red or pink wound bed, without slough. Intact or Open/Ruptured Serum-filled blister.   Ankle-Brachial Index:    Pulses:    Removal Indication and Assessment:    Wound Outcome:    (Retired) Wound Length (cm):    (Retired) Wound Width (cm):    (Retired) Depth (cm):    Wound Description (Comments):    Removal Indications:    Wound Image   10/21/22 1107   Description of Altered Skin Integrity Partial thickness tissue loss. Shallow open ulcer with a red or pink wound bed, without slough. Intact or Open/Ruptured Serum-filled blister. 10/21/22 1107   Dressing Appearance Intact;Moist drainage 10/21/22 1107   Drainage Amount Small 10/21/22 1107   Drainage Characteristics/Odor Serosanguineous;No odor 10/21/22 1107   Appearance Red 10/21/22 1107   Tissue loss description Full thickness 10/21/22 1107   Black (%), Wound Tissue Color 0 % 10/21/22 1107   Red (%), Wound Tissue Color 100 % 10/21/22 1107   Yellow (%), Wound Tissue Color 0 % 10/21/22 1107   Periwound Area Dry;Intact 10/21/22 1107   Wound Edges Callused 10/21/22 1107   Wound Length (cm) 0.2 cm 10/21/22 1107   Wound Width (cm) 0.3 cm 10/21/22 1107   Wound Depth (cm) 0.1 cm 10/21/22 1107   Wound Volume (cm^3) 0.006 cm^3 10/21/22 1107   Wound Surface Area (cm^2) 0.06 cm^2 10/21/22 1107   Care Cleansed with:;Soap and water;Sterile normal saline 10/21/22 1107   Dressing Changed 10/21/22 1107   Compression Tubular elasticized bandage 10/21/22 1107   Off Loading Football dressing;Off loading shoe 10/21/22 1107   Dressing Change Due 10/28/22 10/21/22 1107             Wound 04/15/22 2230 Diabetic Ulcer Left medial;plantar Foot (Active)   04/15/22 2230    Pre-existing: Yes   Primary Wound Type: Diabetic ulc   Side: Left   Orientation: medial;plantar   Location: Foot   Wound Number:    Ankle-Brachial Index:    Pulses:    Removal Indication and Assessment:    Wound Outcome:    (Retired) Wound Type:    (Retired) Wound Length (cm):    (Retired) Wound Width (cm):    (Retired) Depth (cm):    Wound Description (Comments):    Removal Indications:    Wound Image   10/21/22 1107   Wound WDL ex 10/21/22 1107   Dressing Appearance Intact;Moist drainage 10/21/22 1107   Drainage Amount Moderate 10/21/22 1107   Drainage Characteristics/Odor Serosanguineous;No odor 10/21/22 1107   Appearance Red 10/21/22 1107   Tissue loss description Partial thickness 10/21/22 1107   Black (%), Wound Tissue Color 0 % 10/21/22 1107   Red (%), Wound Tissue Color 100 % 10/21/22 1107   Yellow (%), Wound Tissue Color 0 % 10/21/22 1107   Periwound Area Macerated;Pale white 10/21/22 1107   Wound Edges Callused 10/21/22 1107   Wound Length (cm) 3.6 cm 10/21/22 1107   Wound Width (cm) 4.3 cm 10/21/22 1107   Wound Depth (cm) 0.2 cm 10/21/22 1107   Wound Volume (cm^3) 3.096 cm^3 10/21/22 1107   Wound Surface Area (cm^2) 15.48 cm^2 10/21/22 1107   Care Cleansed with:;Soap and water;Sterile normal saline 10/21/22 1107   Dressing Changed 10/21/22 1107   Periwound Care Moisture barrier applied 10/21/22 1107   Compression Two layer compression 10/21/22 1107   Off Loading Football dressing;Off loading shoe 10/21/22 1107   Dressing Change Due 10/25/22 10/21/22 1107           Plan:                Orders Placed This Encounter   Procedures    Change dressing     Right foot: Clean with NS. Cavilon to areas that will be taped. Gentian violet to periwound. Jolynn to wound beds. Mextra short x1, secure with paper tape. Alex foam long x1. Football dressing (cast padding x3). Tubigrip size E (calf size 42.5cm).   Left foot: Gentian  violet to periwound. Cavilon to area where tape will be. Mextra short x1, kiesha foam long x2. Football dressing (cast padding x3). Coflex TLC XL (blue layer to skin before football).    Patient to change LEFT LOWER LEG DRESSING ONLY on Tuesday then daily: Clean with NS. Apply antibiotic powder to wound bed. Cover with foam border. Return to clinic in 1 week.        Follow up in about 1 week (around 10/28/2022) for MD wound care visit.

## 2022-10-23 ENCOUNTER — HOSPITAL ENCOUNTER (EMERGENCY)
Facility: HOSPITAL | Age: 52
Discharge: HOME | End: 2022-10-23
Attending: EMERGENCY MEDICINE
Payer: MEDICAID

## 2022-10-23 VITALS
BODY MASS INDEX: 39.37 KG/M2 | OXYGEN SATURATION: 95 % | HEART RATE: 103 BPM | SYSTOLIC BLOOD PRESSURE: 168 MMHG | DIASTOLIC BLOOD PRESSURE: 84 MMHG | WEIGHT: 245 LBS | RESPIRATION RATE: 20 BRPM | TEMPERATURE: 98 F | HEIGHT: 66 IN

## 2022-10-23 DIAGNOSIS — R23.8 SKIN IRRITATION: Primary | ICD-10-CM

## 2022-10-23 PROCEDURE — 99284 EMERGENCY DEPT VISIT MOD MDM: CPT | Mod: ER

## 2022-10-23 RX ORDER — MUPIROCIN 20 MG/G
OINTMENT TOPICAL 2 TIMES DAILY
Qty: 22 G | Refills: 0 | Status: SHIPPED | OUTPATIENT
Start: 2022-10-23 | End: 2022-11-02

## 2022-10-23 RX ORDER — SULFAMETHOXAZOLE AND TRIMETHOPRIM 800; 160 MG/1; MG/1
1 TABLET ORAL 2 TIMES DAILY
Qty: 20 TABLET | Refills: 0 | Status: SHIPPED | OUTPATIENT
Start: 2022-10-23 | End: 2022-11-02

## 2022-10-23 NOTE — ED PROVIDER NOTES
Encounter Date: 10/23/2022       History     Chief Complaint   Patient presents with    nose pain     Pt states that last Wednesday she noticed 3 bumps on the left side of her nose; bumps have resolved by pain across bridge of her nose persists     52 y.o. female with diabetes, CAD, AFib (on Eliquis) bipolar disorder, eczema, and others presents emergency department complaining of acute, nontraumatic, pain to the bridge of her nose that began four days ago.  She reports undergoing chemical peel the day prior to the onset of symptoms.  She reports noticing small bumps on the left side of her nose that are now resolved.     The history is provided by the patient.   Review of patient's allergies indicates:   Allergen Reactions    Morphine Other (See Comments)     Patient had a psychotic episode after taking Morphine  Agitation, hallucinations    Penicillins Anaphylaxis     itching    Januvia [sitagliptin] Hives    Carbamazepine Other (See Comments)     hyponatremia     Past Medical History:   Diagnosis Date    ADHD (attention deficit hyperactivity disorder)     Arthritis     Asthma     Bipolar 1 disorder     Cataract     Cigarette smoker     COPD (chronic obstructive pulmonary disease)     Coronary artery disease     A fib    Depression     bipolar manic depresson    Diabetes mellitus     Diabetic foot ulcers     Diabetic neuropathy     DVT of lower extremity, bilateral 07/2013    bilateral LE DVT. Estelita filter placed.     Encounter for blood transfusion     History of blood clots 1. Left Leg=2003; 2.Bilateral Groin=Blood Clots= 5 or 6/ 2013 & 7/2013; 3. LLL of Lung=7/2013;  4. Lt. Lower Leg=7/2013.     Pt. had 1st Blood Clot after Ugtqmnskrjwm=[id number], & Last=2013. Estelita Filter= Rt.Lateral Neck.    HTN (hypertension) 06/06/2013    Pt states that she does not have hypertension    Hypercholesteremia     Irregular heartbeat     Neuromuscular disorder     neuropathy feet    Obese     PE (pulmonary embolism) 07/2013  "   bilat LE DVT.     Restless leg syndrome      Past Surgical History:   Procedure Laterality Date    ABDOMINAL SURGERY  2010    gastric sleeve    BILATERAL OOPHORECTOMY Bilateral 2015    CHOLECYSTECTOMY      DEBRIDEMENT OF FOOT Bilateral 5/10/2022    Procedure: DEBRIDEMENT, FOOT;  Surgeon: Maira De Los Santos DPM;  Location: [facility] OR;  Service: Podiatry;  Laterality: Bilateral;    Green' s filter Right 2012    Right Neck & Tunneled Down.    HERNIA REPAIR      "[place] of Hernias Repaires around th Belly Button.", pt. states    LAPAROSCOPIC CHOLECYSTECTOMY N/A 9/10/2020    Procedure: CHOLECYSTECTOMY, LAPAROSCOPIC;  Surgeon: Montrell Gutierrez MD;  Location: [facility] OR;  Service: General;  Laterality: N/A;  RN PREOP ----COVID Negative      OVARIAN CYST REMOVAL  3/13/2014    CT REMOVAL OF OVARY/TUBE(S)      SPLENECTOMY, TOTAL  2003    TONSILLECTOMY      as a child    TYMPANOSTOMY TUBE PLACEMENT      VEIN SURGERY      Lt leg     Family History   Problem Relation Age of Onset    Hypertension Father     Diabetes Father     Heart disease Father     Cataracts Father     Diabetes Paternal Grandfather     Heart disease Paternal Grandfather     No Known Problems Mother     Ovarian cancer Maternal Grandmother          from this. ? age     No Known Problems Sister     No Known Problems Brother     No Known Problems Maternal Aunt     No Known Problems Maternal Uncle     No Known Problems Paternal Aunt     No Known Problems Paternal Uncle     No Known Problems Maternal Grandfather     Ovarian cancer Paternal Grandmother     Uterine cancer Neg Hx     Breast cancer Neg Hx     Colon cancer Neg Hx     Amblyopia Neg Hx     Blindness Neg Hx     Cancer Neg Hx     Glaucoma Neg Hx     Macular degeneration Neg Hx     Retinal detachment Neg Hx     Strabismus Neg Hx     Stroke Neg Hx     Thyroid disease Neg Hx      Social History     Tobacco Use    Smoking status: Every Day     Packs/day: 1.00     Years: 37.00     Pack " years: 37.00     Types: Cigarettes     Last attempt to quit: 2020     Years since quittin.8    Smokeless tobacco: Never    Tobacco comments:     Enrolled in the [place] [employer] on 5/3/14 ([facility] Member ID # [id number]). Ambulatory referral to Smoking Cessation Program   Substance Use Topics    Alcohol use: No     Alcohol/week: 0.0 standard drinks    Drug use: No     Review of Systems   Constitutional:  Negative for fever.   HENT:  Positive for congestion. Negative for facial swelling, sore throat and trouble swallowing.    Skin:  Negative for color change, rash and wound.   All other systems reviewed and are negative.    Physical Exam     Initial Vitals [10/23/22 0438]   BP Pulse Resp Temp SpO2   (!) 168/84 103 20 97.8 °F (36.6 °C) 95 %      MAP       --         Physical Exam    Nursing note and vitals reviewed.  Constitutional: She appears well-developed and well-nourished. She is not diaphoretic. No distress.   HENT:   Head: Normocephalic and atraumatic.   Nose: Nose normal. No mucosal edema or rhinorrhea. No epistaxis.  No foreign bodies.       Mouth/Throat: Oropharynx is clear and moist.   Eyes: Conjunctivae are normal. Pupils are equal, round, and reactive to light.   Neck: Neck supple.   Normal range of motion.  Cardiovascular:  Normal rate and intact distal pulses.           Pulmonary/Chest: No accessory muscle usage. No tachypnea. No respiratory distress.   Abdominal: She exhibits no distension. There is no abdominal tenderness.   Musculoskeletal:         General: No tenderness. Normal range of motion.      Cervical back: Normal range of motion and neck supple.     Neurological: She is alert and oriented to person, place, and time. She has normal strength. Gait normal. GCS eye subscore is 4. GCS verbal subscore is 5. GCS motor subscore is 6.   Skin: Skin is warm and intact. Capillary refill takes less than 2 seconds. No abrasion and no laceration noted.   Psychiatric: She has a normal mood and affect.        ED Course   Procedures  Labs Reviewed - No data to display       Imaging Results    None          Medications - No data to display                           Clinical Impression:   Final diagnoses:  [R23.8] Skin irritation (Primary)      ED Disposition Condition    Discharge Stable          ED Prescriptions       Medication Sig Dispense Start Date End Date Auth. Provider    sulfamethoxazole-trimethoprim 800-160mg (BACTRIM DS) 800-160 mg Tab Take 1 tablet by mouth 2 (two) times daily. for 10 days 20 tablet 10/23/2022 11/2/2022 Pop Tinsley MD    mupirocin (BACTROBAN) 2 % ointment Apply topically 2 (two) times daily. for 10 days 22 g 10/23/2022 11/2/2022 Pop Tinsley MD          Follow-up Information       Follow up With Specialties Details Why Contact Info    Donaldo Pena MD Internal Medicine, Wound Care Call today to schedule an appointment, and ongoing care [facility] [id number] [phone]      The nearest emergency department.  Go to  As needed, If symptoms worsen              Pop Tinsley MD  10/24/22 8697

## 2022-10-23 NOTE — ED TRIAGE NOTES
"Pt reports nose pain x 4 days that radiates across the bridge of her nose to under bilateral eyes. Reports that it started with 3 "bumps" on her nose, but bumps have resolved. Bridge of nose tender to palpation. Denies injury/trauma. Reports that she has multiple dermatology treatments. Pt reports that she goes to wound care weekly for foot ulcers. Denies any other symptoms at this time.  "

## 2022-10-28 ENCOUNTER — HOSPITAL ENCOUNTER (OUTPATIENT)
Dept: WOUND CARE | Facility: HOSPITAL | Age: 52
Discharge: HOME | End: 2022-10-28
Attending: FAMILY MEDICINE
Payer: MEDICAID

## 2022-10-28 VITALS — DIASTOLIC BLOOD PRESSURE: 87 MMHG | SYSTOLIC BLOOD PRESSURE: 170 MMHG | TEMPERATURE: 98 F | HEART RATE: 80 BPM

## 2022-10-28 DIAGNOSIS — S91.302D OPEN WOUND OF LEFT FOOT, SUBSEQUENT ENCOUNTER: Primary | ICD-10-CM

## 2022-10-28 PROCEDURE — 99214 OFFICE O/P EST MOD 30 MIN: CPT | Mod: 25,,, | Performed by: FAMILY MEDICINE

## 2022-10-28 PROCEDURE — 11042 DBRDMT SUBQ TIS 1ST 20SQCM/<: CPT | Mod: ,,, | Performed by: FAMILY MEDICINE

## 2022-10-28 PROCEDURE — 11042 DBRDMT SUBQ TIS 1ST 20SQCM/<: CPT

## 2022-10-28 PROCEDURE — 99214 PR OFFICE/OUTPT VISIT, EST, LEVL IV, 30-39 MIN: ICD-10-PCS | Mod: 25,,, | Performed by: FAMILY MEDICINE

## 2022-10-28 PROCEDURE — 11042 DBRDMT SUBQ TIS 1ST 20SQCM/<: CPT | Performed by: FAMILY MEDICINE

## 2022-10-28 PROCEDURE — 11042 DEBRIDEMENT: ICD-10-PCS | Mod: ,,, | Performed by: FAMILY MEDICINE

## 2022-10-28 RX ORDER — MELOXICAM 15 MG/1
15 TABLET ORAL DAILY
Qty: 30 TABLET | Refills: 0 | Status: ON HOLD | OUTPATIENT
Start: 2022-10-28 | End: 2022-12-27 | Stop reason: HOSPADM

## 2022-10-28 NOTE — PROGRESS NOTES
[facility]                Progress Note    Subjective:       Patient ID: Audrey Natarajan is a 52 y.o. female.    Chief Complaint: Wound Check and Dressing Change    Follow up wound care visit. Patient ambulated to exam room unaided, wearing Darco shoes on bilateral feet.  She c/o pain to Left Plantar Foot rating as 5/10 at present. Dressing intact, patient reports cleaning/changing as ordered daily, with last change yesterday. Right 1st Plantar Toe wound has a scant amount of serosanguineous, non-malodor drainage, Left Plantar Foot has a large amount of serosanguineous, non-malodor drainage. Right 1st Plantar Toe wound appears healed after MD debrided. Left Plantar Foot wound measuring smaller in (0.5 cm) length and in (0.3 cm) width.     Wound care done as per order. Patient to keep current dressing on until Tuesday, then daily changes starting Tuesday. Return to clinic in 1 week.      Review of Systems   Constitutional: Negative.    HENT: Negative.     Eyes: Negative.    Respiratory: Negative.     Cardiovascular: Negative.    Gastrointestinal: Negative.    Endocrine: Negative.    Musculoskeletal: Negative.    Skin:  Positive for wound.   All other systems reviewed and are negative.      Objective:        Physical Exam  Vitals reviewed.   Constitutional:       Appearance: She is well-developed.   HENT:      Head: Normocephalic and atraumatic.   Eyes:      Extraocular Movements: Extraocular movements intact.      Conjunctiva/sclera: Conjunctivae normal.      Pupils: Pupils are equal, round, and reactive to light.   Skin:     General: Skin is warm and dry.      Comments: See wound description for further information   Neurological:      Mental Status: She is alert and oriented to person, place, and time.   Psychiatric:         Mood and Affect: Mood normal.         Behavior: Behavior normal.       Vitals:    10/28/22 1115   BP: (!) 170/87   Pulse: 80   Temp: 97.6 °F  "(36.4 °C)     Debridement    Date/Time: 10/28/2022 10:49 AM  Performed by: Jennifer Zambrano MD  Authorized by: Jennifer Zambrano MD     Time out: Immediately prior to procedure a "time out" was called to verify the correct patient, procedure, equipment, support staff and site/side marked as required.    Consent Done?:  Yes (Written)    Preparation: Patient was prepped and draped in usual sterile fashion    Local anesthesia used?: No      Wound Details:    Location:  Left foot    Location:  Left Midfoot    Type of Debridement:  Excisional       Length (cm):  3.1       Area (sq cm):  12.4       Width (cm):  4       Percent Debrided (%):  100       Depth (cm):  0.2       Total Area Debrided (sq cm):  12.4    Depth of debridement:  Subcutaneous tissue    Devitalized tissue debrided:  Slough, Exudate, Callus and Biofilm    Instruments:  Curette    Bleeding:  Minimal  Hemostasis Achieved: Yes    Method Used:  Pressure  Patient tolerance:  Patient tolerated the procedure well with no immediate complications      Assessment:           ICD-10-CM ICD-9-CM   1. Open wound of left foot, subsequent encounter  S91.302D V58.89     892.0            Wound 04/15/22 2230 Diabetic Ulcer Left medial;plantar Foot (Active)   04/15/22 2230    Pre-existing: Yes   Primary Wound Type: Diabetic ulc   Side: Left   Orientation: medial;plantar   Location: Foot   Wound Number:    Ankle-Brachial Index:    Pulses:    Removal Indication and Assessment:    Wound Outcome:    (Retired) Wound Type:    (Retired) Wound Length (cm):    (Retired) Wound Width (cm):    (Retired) Depth (cm):    Wound Description (Comments):    Removal Indications:    Wound Image    10/28/22 1140   Wound WDL ex 10/28/22 1140   Dressing Appearance Intact;Moist drainage 10/28/22 1140   Drainage Amount Large 10/28/22 1140   Drainage Characteristics/Odor Serosanguineous;No odor 10/28/22 1140   Appearance Red;Yellow;Slough 10/28/22 1140   Tissue loss description Partial thickness " 10/28/22 1140   Black (%), Wound Tissue Color 0 % 10/28/22 1140   Red (%), Wound Tissue Color 90 % 10/28/22 1140   Yellow (%), Wound Tissue Color 10 % 10/28/22 1140   Periwound Area Macerated;Moist;Pale white 10/28/22 1140   Wound Edges Defined;Open 10/28/22 1140   Wound Length (cm) 3.1 cm 10/28/22 1140   Wound Width (cm) 4 cm 10/28/22 1140   Wound Depth (cm) 0.2 cm 10/28/22 1140   Wound Volume (cm^3) 2.48 cm^3 10/28/22 1140   Wound Surface Area (cm^2) 12.4 cm^2 10/28/22 1140   Tunneling (depth (cm)/location) 0 10/28/22 1140   Undermining (depth (cm)/location) 0 10/28/22 1140   Care Cleansed with:;Antimicrobial agent;Sterile normal saline 10/28/22 1140   Dressing Changed 10/28/22 1140   Periwound Care Moisture barrier applied 10/28/22 1140   Off Loading Football dressing;Off loading shoe 10/28/22 1140   Dressing Change Due 11/04/22 10/28/22 1140       [REMOVED]      Altered Skin Integrity 09/30/22 1120 Right plantar Toe, first #2 Diabetic Ulcer Partial thickness tissue loss. Shallow open ulcer with a red or pink wound bed, without slough. Intact or Open/Ruptured Serum-filled blister. (Removed)   09/30/22 1120   Altered Skin Integrity Present on Admission:    Side: Right   Orientation: plantar   Location: Toe, first   Wound Number: #2   Is this injury device related?: No   Primary Wound Type: Diabetic [facility]   Description of Altered Skin Integrity: Partial thickness tissue loss. Shallow open ulcer with a red or pink wound bed, without slough. Intact or Open/Ruptured Serum-filled blister.   Ankle-Brachial Index:    Pulses:    Removal Indication and Assessment:    Wound Outcome: Healed   (Retired) Wound Length (cm):    (Retired) Wound Width (cm):    (Retired) Depth (cm):    Wound Description (Comments):    Removal Indications:    Removed 10/28/22    Wound Image    10/28/22 1140   Description of Altered Skin Integrity Partial thickness tissue loss. Shallow open ulcer with a red or pink wound bed, without slough. Intact or  Open/Ruptured Serum-filled blister. 10/28/22 1140   Dressing Appearance Intact;Dried drainage 10/28/22 1140   Drainage Amount Scant 10/28/22 1140   Drainage Characteristics/Odor Serosanguineous 10/28/22 1140   Appearance Closed/resurfaced 10/28/22 1140   Wound Length (cm) 0 cm 10/28/22 1140   Wound Width (cm) 0 cm 10/28/22 1140   Wound Depth (cm) 0 cm 10/28/22 1140   Wound Volume (cm^3) 0 cm^3 10/28/22 1140   Wound Surface Area (cm^2) 0 cm^2 10/28/22 1140   Tunneling (depth (cm)/location) 0 10/28/22 1140   Undermining (depth (cm)/location) 0 10/28/22 1140   Care Cleansed with:;Antimicrobial agent;Sterile normal saline 10/28/22 1140   Dressing Removed 10/28/22 1140   Compression Tubular elasticized bandage 10/28/22 1140           Plan:            Debridement needed and Done today.   Continue off-loading / leg elevation   Continue eating protein   Keep dressing clean and intact  Continue with wound care orders and plan as noted in orders.   Continue to follow current medication regimen as per pcp   Call for any questions / concerns.           Orders Placed This Encounter   Procedures    Change dressing     Clean with NS.   Right Lower Leg: Tubigrip size E (calf size 42.5cm).   Left foot: Gentian violet to periwound. Cavilon to area where tape will be. Jolynn to wound bed. Mextra short x1, kiesha foam long x2. Football dressing (cast padding x3). Coban.     Patient to change LEFT LOWER LEG DRESSING ONLY on Tuesday then daily: Clean with NS. Apply antibiotic powder to wound bed. Cover with foam border. Return to clinic in 1 week.        Follow up in about 1 week (around 11/4/2022) for wound care.

## 2022-11-01 ENCOUNTER — HOSPITAL ENCOUNTER (OUTPATIENT)
Dept: RADIOLOGY | Facility: HOSPITAL | Age: 52
Discharge: HOME | End: 2022-11-01
Attending: INTERNAL MEDICINE
Payer: MEDICAID

## 2022-11-01 DIAGNOSIS — Z12.31 ENCOUNTER FOR SCREENING MAMMOGRAM FOR MALIGNANT NEOPLASM OF BREAST: ICD-10-CM

## 2022-11-01 PROCEDURE — 77067 MAMMO DIGITAL SCREENING BILAT WITH TOMO: ICD-10-PCS | Mod: 26,,, | Performed by: RADIOLOGY

## 2022-11-01 PROCEDURE — 77067 SCR MAMMO BI INCL CAD: CPT | Mod: 26,,, | Performed by: RADIOLOGY

## 2022-11-01 PROCEDURE — 77067 SCR MAMMO BI INCL CAD: CPT | Mod: TC

## 2022-11-01 PROCEDURE — 77063 BREAST TOMOSYNTHESIS BI: CPT | Mod: TC

## 2022-11-01 PROCEDURE — 77063 BREAST TOMOSYNTHESIS BI: CPT | Mod: 26,,, | Performed by: RADIOLOGY

## 2022-11-01 PROCEDURE — 77063 MAMMO DIGITAL SCREENING BILAT WITH TOMO: ICD-10-PCS | Mod: 26,,, | Performed by: RADIOLOGY

## 2022-11-04 ENCOUNTER — HOSPITAL ENCOUNTER (OUTPATIENT)
Dept: WOUND CARE | Facility: HOSPITAL | Age: 52
Discharge: HOME | End: 2022-11-04
Attending: FAMILY MEDICINE
Payer: MEDICAID

## 2022-11-04 VITALS — TEMPERATURE: 98 F | HEART RATE: 110 BPM | SYSTOLIC BLOOD PRESSURE: 144 MMHG | DIASTOLIC BLOOD PRESSURE: 65 MMHG

## 2022-11-04 DIAGNOSIS — B37.9 YEAST INFECTION: ICD-10-CM

## 2022-11-04 DIAGNOSIS — S91.302D OPEN WOUND OF LEFT FOOT, SUBSEQUENT ENCOUNTER: Primary | ICD-10-CM

## 2022-11-04 PROCEDURE — 99214 PR OFFICE/OUTPT VISIT, EST, LEVL IV, 30-39 MIN: ICD-10-PCS | Mod: ,,, | Performed by: FAMILY MEDICINE

## 2022-11-04 PROCEDURE — 99213 OFFICE O/P EST LOW 20 MIN: CPT | Performed by: FAMILY MEDICINE

## 2022-11-04 PROCEDURE — 99214 OFFICE O/P EST MOD 30 MIN: CPT | Mod: ,,, | Performed by: FAMILY MEDICINE

## 2022-11-04 RX ORDER — FLUCONAZOLE 150 MG/1
150 TABLET ORAL ONCE
Qty: 1 TABLET | Refills: 0 | Status: SHIPPED | OUTPATIENT
Start: 2022-11-04 | End: 2022-11-04

## 2022-11-04 NOTE — PROGRESS NOTES
[facility]                Progress Note    Subjective:       Patient ID: Audrey Natarajan is a 52 y.o. female.    Chief Complaint: Wound Check and Dressing Change    Follow up wound care visit. Patient ambulated to exam room unaided, wearing Darco shoes on bilateral feet.  She c/o pain to Left Plantar Foot rating as 6/10 at present. Dressing intact to Left Plantar Foot, last change was yesterday. Right 1st Plantar Toe wound has a scant amount of serosanguineous, non-malodor drainage, Left Plantar Foot has a moderate amount of serosanguineous, non-malodor drainage. Left Plantar Foot wound measuring larger in (0.4 cm) length and in (0.6 cm) width.     Wound care done as per order. Patient to keep current dressing on until Tuesday, then daily changes thereafter daily. Return to clinic in 1 week.    Review of Systems   Constitutional: Negative.    HENT: Negative.     Eyes: Negative.    Respiratory: Negative.     Cardiovascular: Negative.    Gastrointestinal: Negative.    Endocrine: Negative.    Musculoskeletal: Negative.    Skin:  Positive for wound.   All other systems reviewed and are negative.      Objective:        Physical Exam  Vitals reviewed.   Constitutional:       Appearance: She is well-developed.   HENT:      Head: Normocephalic and atraumatic.   Eyes:      Extraocular Movements: Extraocular movements intact.      Conjunctiva/sclera: Conjunctivae normal.      Pupils: Pupils are equal, round, and reactive to light.   Skin:     General: Skin is warm and dry.      Comments: See wound description for further information   Neurological:      Mental Status: She is alert and oriented to person, place, and time.   Psychiatric:         Mood and Affect: Mood normal.         Behavior: Behavior normal.       Vitals:    11/04/22 1052   BP: (!) 144/65   Pulse: 110   Temp: 98.2 °F (36.8 °C)       Assessment:           ICD-10-CM ICD-9-CM   1. Open wound of left foot,  subsequent encounter  S91.302D V58.89     892.0   2. Yeast infection  B37.9 112.9            Wound 04/15/22 2230 Diabetic Ulcer Left medial;plantar Foot (Active)   04/15/22 2230    Pre-existing: Yes   Primary Wound Type: Diabetic ulc   Side: Left   Orientation: medial;plantar   Location: Foot   Wound Number:    Ankle-Brachial Index:    Pulses:    Removal Indication and Assessment:    Wound Outcome:    (Retired) Wound Type:    (Retired) Wound Length (cm):    (Retired) Wound Width (cm):    (Retired) Depth (cm):    Wound Description (Comments):    Removal Indications:    Wound Image   11/04/22 1059   Wound WDL ex 11/04/22 1059   Dressing Appearance Intact;Moist drainage 11/04/22 1059   Drainage Amount Moderate 11/04/22 1059   Drainage Characteristics/Odor Serous;No odor 11/04/22 1059   Appearance Red;Moist 11/04/22 1059   Tissue loss description Partial thickness 11/04/22 1059   Periwound Area Macerated;Moist;Pale white 11/04/22 1059   Wound Edges Defined;Open 11/04/22 1059   Wound Length (cm) 3.5 cm 11/04/22 1059   Wound Width (cm) 4.6 cm 11/04/22 1059   Wound Depth (cm) 0.2 cm 11/04/22 1059   Wound Volume (cm^3) 3.22 cm^3 11/04/22 1059   Wound Surface Area (cm^2) 16.1 cm^2 11/04/22 1059   Tunneling (depth (cm)/location) 0 11/04/22 1059   Undermining (depth (cm)/location) 0 11/04/22 1059           Plan:            Debridement not needed and Not Done today.   Continue off-loading / leg elevation   Continue eating protein   Keep dressing clean and intact  Continue with wound care orders and plan as noted in orders.   Continue to follow current medication regimen as per pcp   Call for any questions / concerns.       Orders Placed This Encounter   Procedures    Change dressing     Clean with NS.   Left foot: Gentian violet to periwound. Antibiotic powder (pt's own, cefepime) then Jolynn to wound bed. Mextra short x1, Alex foam long x2. Football dressing (cast padding x3). Coban.     Patient to change LEFT LOWER LEG  DRESSING ONLY on Tuesday then daily: Clean with NS. Apply antibiotic powder to wound bed. Cover with foam border. Return to clinic in 1 week.        Follow up in about 1 week (around 11/11/2022) for wound care.

## 2022-11-11 ENCOUNTER — HOSPITAL ENCOUNTER (OUTPATIENT)
Dept: WOUND CARE | Facility: HOSPITAL | Age: 52
Discharge: HOME | End: 2022-11-11
Attending: PODIATRIST
Payer: MEDICAID

## 2022-11-11 VITALS — HEART RATE: 88 BPM | DIASTOLIC BLOOD PRESSURE: 69 MMHG | SYSTOLIC BLOOD PRESSURE: 149 MMHG | TEMPERATURE: 98 F

## 2022-11-11 DIAGNOSIS — E11.621 DIABETIC ULCER OF OTHER PART OF FOOT ASSOCIATED WITH TYPE 2 DIABETES MELLITUS, WITH FAT LAYER EXPOSED, UNSPECIFIED LATERALITY: Primary | ICD-10-CM

## 2022-11-11 DIAGNOSIS — L97.502 DIABETIC ULCER OF OTHER PART OF FOOT ASSOCIATED WITH TYPE 2 DIABETES MELLITUS, WITH FAT LAYER EXPOSED, UNSPECIFIED LATERALITY: Primary | ICD-10-CM

## 2022-11-11 DIAGNOSIS — M14.672 CHARCOT'S JOINT OF LEFT FOOT: ICD-10-CM

## 2022-11-11 DIAGNOSIS — S91.302D OPEN WOUND OF LEFT FOOT, SUBSEQUENT ENCOUNTER: ICD-10-CM

## 2022-11-11 PROCEDURE — 11042 DBRDMT SUBQ TIS 1ST 20SQCM/<: CPT | Mod: ,,, | Performed by: PODIATRIST

## 2022-11-11 PROCEDURE — 99499 UNLISTED E&M SERVICE: CPT | Mod: ,,, | Performed by: PODIATRIST

## 2022-11-11 PROCEDURE — 11042 DBRDMT SUBQ TIS 1ST 20SQCM/<: CPT | Performed by: PODIATRIST

## 2022-11-11 PROCEDURE — 11042 WOUND DEBRIDEMENT: ICD-10-PCS | Mod: ,,, | Performed by: PODIATRIST

## 2022-11-11 PROCEDURE — 99499 NO LOS: ICD-10-PCS | Mod: ,,, | Performed by: PODIATRIST

## 2022-11-11 NOTE — PROGRESS NOTES
[facility]                Progress Note    Subjective:       Patient ID: Audrey Natarajan is a 52 y.o. female.    Chief Complaint: Wound Check and Dressing Change    Follow up wound care visit. Patient ambulated to exam room unaided, wearing Darco shoes on bilateral feet. No c/o pain at present. Dressing intact to Left Plantar Foot, she reports changing yesterday, drainage is serous, non-malodor.      Review of Systems   Constitutional:  Positive for activity change. Negative for appetite change, chills and fever.   Respiratory:  Negative for cough and shortness of breath.    Cardiovascular:  Positive for leg swelling. Negative for chest pain.   Gastrointestinal:  Negative for diarrhea, nausea and vomiting.   Musculoskeletal:  Positive for arthralgias and myalgias.   Skin:  Positive for wound.   Neurological:  Positive for numbness. Negative for weakness.        + paresthesia        Objective:        Physical Exam  Nursing note reviewed.   Constitutional:       General: She is not in acute distress.     Appearance: She is not toxic-appearing or diaphoretic.   Cardiovascular:      Pulses:           Dorsalis pedis pulses are 1+ on the right side and 1+ on the left side.        Posterior tibial pulses are 2+ on the right side and 2+ on the left side.   Pulmonary:      Effort: No respiratory distress.   Musculoskeletal:      Right lower leg: Edema present.      Left lower leg: Edema present.      Right ankle: Swelling present. No lateral malleolus, medial malleolus, AITF ligament, CF ligament or posterior TF ligament tenderness. Decreased range of motion.      Right Achilles Tendon: No defects. Paniagua's test negative.      Left ankle: Swelling present. No lateral malleolus, medial malleolus, AITF ligament, CF ligament, posterior TF ligament or proximal fibula tenderness. Decreased range of motion.      Left Achilles Tendon: No defects. Paniagua's test negative.       Right foot: Swelling and tenderness present.      Left foot: Swelling, Charcot foot and tenderness present.      Comments: There is equinus deformity bilateral with decreased dorsiflexion at the ankle joint bilateral    Decreased first MPJ range of motion both weightbearing and nonweightbearing, no crepitus observed the first MP joint, + dorsal flag sign. Mild  bunion deformity is observed .    Patient has hammertoes of digits 2-5 bilateral partially reducible without symptom today.     Skin:     General: Skin is warm and dry.      Coloration: Skin is not pale.      Findings: Erythema and wound (see below) present. No laceration.      Nails: There is no clubbing.   Neurological:      Sensory: No sensory deficit.      Motor: No tremor, atrophy or abnormal muscle tone.      Deep Tendon Reflexes: Reflexes are normal and symmetric.      Comments: Paresthesias, and hyperesthesia bilateral feet at toes with no clearly identified trigger or source.    [place] 5.07 monofilament is intact bilateral feet. Sharp/dull sensation is also intact Bilateral feet.   Psychiatric:         Attention and Perception: She is attentive.         Mood and Affect: Mood is not anxious. Affect is not inappropriate.         Speech: She is communicative. Speech is not slurred.         Behavior: Behavior is not combative.       Vitals:    11/11/22 1102   BP: (!) 149/69   Pulse: 88   Temp: 97.7 °F (36.5 °C)       Assessment:           ICD-10-CM ICD-9-CM   1. Diabetic ulcer of other part of foot associated with type 2 diabetes mellitus, with fat layer exposed, unspecified laterality  E11.621 250.80    L97.502 707.15   2. Open wound of left foot, subsequent encounter  S91.302D V58.89     892.0   3. Charcot's joint of left foot  M14.672 094.0     713.5            Wound 04/15/22 2230 Diabetic Ulcer Left medial;plantar Foot (Active)   04/15/22 2230    Pre-existing: Yes   Primary Wound Type: Diabetic ulc   Side: Left   Orientation: medial;plantar    Location: Foot   Wound Number:    Ankle-Brachial Index:    Pulses:    Removal Indication and Assessment:    Wound Outcome:    (Retired) Wound Type:    (Retired) Wound Length (cm):    (Retired) Wound Width (cm):    (Retired) Depth (cm):    Wound Description (Comments):    Removal Indications:    Wound Image    11/11/22 1232   Wound WDL ex 11/11/22 1232   Dressing Appearance Intact;Moist drainage 11/11/22 1232   Drainage Amount Moderate 11/11/22 1232   Drainage Characteristics/Odor Serous;No odor 11/11/22 1232   Appearance Red;Moist 11/11/22 1232   Tissue loss description Partial thickness 11/11/22 1232   Black (%), Wound Tissue Color 0 % 11/11/22 1232   Red (%), Wound Tissue Color 100 % 11/11/22 1232   Yellow (%), Wound Tissue Color 0 % 11/11/22 1232   Periwound Area Macerated;Moist;Pale white 11/11/22 1232   Wound Edges Defined;Open 11/11/22 1232   Wound Length (cm) 3 cm 11/11/22 1232   Wound Width (cm) 4.2 cm 11/11/22 1232   Wound Depth (cm) 0.2 cm 11/11/22 1232   Wound Volume (cm^3) 2.52 cm^3 11/11/22 1232   Wound Surface Area (cm^2) 12.6 cm^2 11/11/22 1232   Tunneling (depth (cm)/location) 0 11/11/22 1232   Undermining (depth (cm)/location) 0 11/11/22 1232   Care Cleansed with:;Antimicrobial agent;Sterile normal saline 11/11/22 1232   Dressing Changed 11/11/22 1232   Periwound Care Moisture barrier applied 11/11/22 1232   Off Loading Football dressing;Off loading shoe 11/11/22 1232   Dressing Change Due 11/18/22 11/11/22 1232           Plan:              Wound Debridement    Date/Time: 11/11/2022 10:11 AM  Performed by: Maira De Los Santos DPM  Authorized by: Maira De Los Santos DPM     Consent Done?:  Yes (Written)  Local anesthesia used?: No      Wound Details:    Location:  Left foot    Location:  Left Midfoot    Type of Debridement:  Excisional       Length (cm):  3       Area (sq cm):  12.6       Width (cm):  4.2       Percent Debrided (%):  100       Depth (cm):  0.2       Total Area Debrided (sq cm):  12.6     Depth of debridement:  Subcutaneous tissue    Tissue debrided:  Epidermis, Subcutaneous and Dermis    Devitalized tissue debrided:  Callus and Fibrin    Instruments:  Curette    Bleeding:  Minimal  Hemostasis Achieved: Yes    Method Used:  Pressure  Patient tolerance:  Patient tolerated the procedure well with no immediate complications    Right 1st Plantar Toe wound is open to air and appears healed. Left Plantar Foot wound measuring smaller in (0.4 cm) length and in (0.6 cm) width, but larger in (0.1 cm) depth      Patient planning to walk this weekend, advised to wear CAM boot or custom boot on Left Foot. Wound care done as per order. Patient to keep current dressing on until Tuesday, then daily changes thereafter daily. Return to clinic in 1 week.    Orders Placed This Encounter   Procedures    Debridement     This order was created via procedure documentation     Standing Status:   Standing     Number of Occurrences:   1    Change dressing     Clean with NS.   Left foot: Gentian violet to periwound. Antibiotic powder (pt's own, cefepime) then Endoform to wound bed. Mextra short x1, Alex foam long x2. Football dressing (cast padding x3). Coban.     Patient to change dressing on Tuesday then daily: Clean with NS. Apply antibiotic powder to wound bed. Cover with foam border. Return to clinic in 1 week.          Follow up in about 1 week (around 11/18/2022) for wound care.

## 2022-11-14 DIAGNOSIS — E11.42 TYPE 2 DIABETES MELLITUS WITH DIABETIC POLYNEUROPATHY, WITHOUT LONG-TERM CURRENT USE OF INSULIN: ICD-10-CM

## 2022-11-14 NOTE — TELEPHONE ENCOUNTER
No new care gaps identified.  [facility] Embedded Care Gaps. Reference number: [id number]. 11/14/2022   5:31:39 PM CST

## 2022-11-15 RX ORDER — GABAPENTIN 300 MG/1
CAPSULE ORAL
Qty: 120 CAPSULE | Refills: 2 | Status: SHIPPED | OUTPATIENT
Start: 2022-11-15 | End: 2023-02-06

## 2022-11-18 ENCOUNTER — HOSPITAL ENCOUNTER (OUTPATIENT)
Dept: WOUND CARE | Facility: HOSPITAL | Age: 52
Discharge: HOME | End: 2022-11-18
Attending: INTERNAL MEDICINE
Payer: MEDICAID

## 2022-11-18 ENCOUNTER — TELEPHONE (OUTPATIENT)
Dept: FAMILY MEDICINE | Facility: CLINIC | Age: 52
End: 2022-11-18
Payer: MEDICAID

## 2022-11-18 VITALS — SYSTOLIC BLOOD PRESSURE: 140 MMHG | TEMPERATURE: 98 F | DIASTOLIC BLOOD PRESSURE: 58 MMHG | HEART RATE: 83 BPM

## 2022-11-18 DIAGNOSIS — S91.302D OPEN WOUND OF LEFT FOOT, SUBSEQUENT ENCOUNTER: ICD-10-CM

## 2022-11-18 DIAGNOSIS — L97.509 DIABETIC FOOT ULCER ASSOCIATED WITH TYPE 2 DIABETES MELLITUS: Primary | ICD-10-CM

## 2022-11-18 DIAGNOSIS — M54.42 CHRONIC BILATERAL LOW BACK PAIN WITH LEFT-SIDED SCIATICA: Primary | ICD-10-CM

## 2022-11-18 DIAGNOSIS — G89.29 CHRONIC BILATERAL LOW BACK PAIN WITH LEFT-SIDED SCIATICA: Primary | ICD-10-CM

## 2022-11-18 DIAGNOSIS — E11.621 DIABETIC FOOT ULCER ASSOCIATED WITH TYPE 2 DIABETES MELLITUS: Primary | ICD-10-CM

## 2022-11-18 DIAGNOSIS — M14.672 CHARCOT'S JOINT OF LEFT FOOT: ICD-10-CM

## 2022-11-18 PROCEDURE — 99499 NO LOS: ICD-10-PCS | Mod: ,,, | Performed by: INTERNAL MEDICINE

## 2022-11-18 PROCEDURE — 11042 WOUND DEBRIDEMENT: ICD-10-PCS | Mod: ,,, | Performed by: INTERNAL MEDICINE

## 2022-11-18 PROCEDURE — 99499 UNLISTED E&M SERVICE: CPT | Mod: ,,, | Performed by: INTERNAL MEDICINE

## 2022-11-18 PROCEDURE — 11042 DBRDMT SUBQ TIS 1ST 20SQCM/<: CPT | Mod: ,,, | Performed by: INTERNAL MEDICINE

## 2022-11-18 PROCEDURE — 11042 DBRDMT SUBQ TIS 1ST 20SQCM/<: CPT | Performed by: INTERNAL MEDICINE

## 2022-11-18 RX ORDER — TRAMADOL HYDROCHLORIDE 50 MG/1
50 TABLET ORAL EVERY 8 HOURS PRN
Qty: 28 TABLET | Refills: 0 | Status: SHIPPED | OUTPATIENT
Start: 2022-11-18 | End: 2022-11-28

## 2022-11-18 NOTE — PROCEDURES
"Wound Debridement    Date/Time: 11/18/2022 10:52 AM  Performed by: Donaldo Pena MD  Authorized by: Donaldo Pena MD     Time out: Immediately prior to procedure a "time out" was called to verify the correct patient, procedure, equipment, support staff and site/side marked as required.    Consent Done?:  Yes (Verbal)  Local anesthesia used?: No      Wound Details:    Location:  Left foot    Location:  Left Plantar    Type of Debridement:  Excisional       Length (cm):  2.6       Area (sq cm):  11.44       Width (cm):  4.4       Percent Debrided (%):  25       Depth (cm):  0.2       Total Area Debrided (sq cm):  2.86    Depth of debridement:  Subcutaneous tissue    Tissue debrided:  Hypergranulation and Subcutaneous    Devitalized tissue debrided:  Biofilm, Callus and Fibrin    Instruments:  Curette    Bleeding:  Minimal  Hemostasis Achieved: Yes    Method Used:  Pressure  Patient tolerance:  Patient tolerated the procedure well with no immediate complications  "

## 2022-11-18 NOTE — PROGRESS NOTES
[facility]                Progress Note    Subjective:       Patient ID: Audrey Natarajan is a 52 y.o. female.    Chief Complaint: No chief complaint on file.    Follow up wound care visit. Patient ambulated to exam room unaided, wearing Darco shoes on bilateral feet. She c/o pain to her Left Foot at present. Dressing intact to Left Plantar Foot, she reports changing yesterday, drainage is  copious, sanguineous, non-malodor. Left Plantar Foot wound measuring smaller in (0.4 cm) length but larger in (0.2 cm) width - MD debrided.     Reordered home supplies. Wound care done as per order. Patient to keep current dressing on until Tuesday, then daily changes thereafter daily. Return to clinic in 1 week.    She is changing left foot dressing once daily and applying topical antibiotic powder there is drainage through dressing today no fevers/chills pain with ambulation she is using darco shoe and elevating foot       Review of Systems      Objective:        Physical Exam  Constitutional:       General: She is not in acute distress.     Appearance: She is obese.   HENT:      Head: Normocephalic and atraumatic.   Pulmonary:      Effort: Pulmonary effort is normal. No respiratory distress.   Musculoskeletal:      Right lower leg: Edema present.      Left lower leg: Edema present.   Skin:     Comments: Periwound callous central pink granulation no palpable or visible bone/tendon see procedure note for debridement details   Neurological:      Mental Status: She is alert.       Vitals:    11/18/22 1119   BP: (!) 140/58   Pulse: 83   Temp: 98 °F (36.7 °C)       Assessment:           ICD-10-CM ICD-9-CM   1. Open wound of left foot, subsequent encounter  S91.302D V58.89     892.0   2. Diabetic foot ulcer associated with type 2 diabetes mellitus  E11.621 250.80    L97.509 707.15   3. Charcot's joint of left foot  M14.672 094.0     713.5            Wound 04/15/22 2230 Diabetic Ulcer  Left medial;plantar Foot (Active)   04/15/22 2230    Pre-existing: Yes   Primary Wound Type: Diabetic ulc   Side: Left   Orientation: medial;plantar   Location: Foot   Wound Number:    Ankle-Brachial Index:    Pulses:    Removal Indication and Assessment:    Wound Outcome:    (Retired) Wound Type:    (Retired) Wound Length (cm):    (Retired) Wound Width (cm):    (Retired) Depth (cm):    Wound Description (Comments):    Removal Indications:    Wound Image    11/18/22 1118   Wound WDL ex 11/18/22 1118   Dressing Appearance Intact;Moist drainage;Saturated 11/18/22 1118   Drainage Amount Copious 11/18/22 1118   Drainage Characteristics/Odor Sanguineous;No odor 11/18/22 1118   Appearance Red;Moist 11/18/22 1118   Tissue loss description Partial thickness 11/18/22 1118   Black (%), Wound Tissue Color 0 % 11/18/22 1118   Red (%), Wound Tissue Color 100 % 11/18/22 1118   Yellow (%), Wound Tissue Color 0 % 11/18/22 1118   Periwound Area Macerated;Moist;Pale white 11/18/22 1118   Wound Edges Defined;Open 11/18/22 1118   Wound Length (cm) 2.6 cm 11/18/22 1118   Wound Width (cm) 4.4 cm 11/18/22 1118   Wound Depth (cm) 0.3 cm 11/18/22 1118   Wound Volume (cm^3) 3.432 cm^3 11/18/22 1118   Wound Surface Area (cm^2) 11.44 cm^2 11/18/22 1118   Tunneling (depth (cm)/location) 0 11/18/22 1118   Undermining (depth (cm)/location) 0 11/18/22 1118   Care Cleansed with:;Antimicrobial agent;Sterile normal saline;Debrided 11/18/22 1118   Dressing Changed 11/18/22 1118   Periwound Care Absorptive dressing applied 11/18/22 1118   Off Loading Football dressing;Off loading shoe 11/18/22 1118   Dressing Change Due 11/25/22 11/18/22 1118           Plan:        Debridemetn done today continue offloading dressing and topical antibiotics to help manage wound drainage follow up in one week        Orders Placed This Encounter   Procedures    WOUND SUPPLIES FOR HOME USE     Left Foot: Patient to change dressing on Tuesday then daily: Clean with NS.  "Apply antibiotic powder to wound bed. Cover with foam border. Return to clinic in 1 week.     Order Specific Question:   Height:     Answer:   5' 6"     Order Specific Question:   Weight:     Answer:   245 lb     Order Specific Question:   Length of need (1-99 months):     Answer:   3    Change dressing     Clean with NS.   Left foot: Gentian violet to periwound. Antibiotic powder (pt's own, cefepime) then Endoform to wound bed. Mextra short x1, Alex foam long x2. Football dressing (cast padding x3). Coban.     Patient to change dressing on Tuesday then daily: Clean with NS. Apply antibiotic powder to wound bed. Cover with foam border. Return to clinic in 1 week.        Follow up in about 1 week (around 11/25/2022) for wound care.         "

## 2022-11-23 DIAGNOSIS — E11.9 TYPE 2 DIABETES MELLITUS WITHOUT COMPLICATION: ICD-10-CM

## 2022-11-28 ENCOUNTER — PATIENT MESSAGE (OUTPATIENT)
Dept: ADMINISTRATIVE | Facility: HOSPITAL | Age: 52
End: 2022-11-28
Payer: MEDICAID

## 2022-12-02 ENCOUNTER — HOSPITAL ENCOUNTER (OUTPATIENT)
Dept: WOUND CARE | Facility: HOSPITAL | Age: 52
Discharge: HOME | End: 2022-12-02
Attending: PODIATRIST
Payer: MEDICAID

## 2022-12-02 VITALS — SYSTOLIC BLOOD PRESSURE: 133 MMHG | HEART RATE: 100 BPM | DIASTOLIC BLOOD PRESSURE: 70 MMHG | TEMPERATURE: 98 F

## 2022-12-02 DIAGNOSIS — M79.89 LEG SWELLING: ICD-10-CM

## 2022-12-02 DIAGNOSIS — S91.302A OPEN WOUND OF LEFT FOOT: Primary | ICD-10-CM

## 2022-12-02 PROBLEM — A41.02 SEPSIS DUE TO METHICILLIN RESISTANT STAPHYLOCOCCUS AUREUS (MRSA): Status: RESOLVED | Noted: 2022-04-18 | Resolved: 2022-12-02

## 2022-12-02 PROCEDURE — 99214 OFFICE O/P EST MOD 30 MIN: CPT | Mod: ,,, | Performed by: FAMILY MEDICINE

## 2022-12-02 PROCEDURE — 99215 OFFICE O/P EST HI 40 MIN: CPT | Performed by: FAMILY MEDICINE

## 2022-12-02 PROCEDURE — 99214 PR OFFICE/OUTPT VISIT, EST, LEVL IV, 30-39 MIN: ICD-10-PCS | Mod: ,,, | Performed by: FAMILY MEDICINE

## 2022-12-02 NOTE — PROGRESS NOTES
"[facility]                Progress Note    Subjective:       Patient ID: Audrey Natarajan is a 52 y.o. female.    Chief Complaint: Wound Check    Walked to clinic unaided with swollen legs. States pain severe and Tramadol not working Dr Oleary will reach out to Dr De Los Santos to see name of pain med that worked in past. Crying when discussing pain but no change when wound cleanedc. Wound slightly bigger missed last week appointment and had been changing dsg self at home using abx powder Small amt drainage but states changes dsg when strikethrough drainage. States walks far to BR options given ie sitting with leg elevated near BR. Not eating much but enc to get adequate protein and vegetables and limiting salt. States blood sugar always under 150. Will return next Friday.    MD note:  patient presenting today for large DFU to left foot.  She states that she is a lot of pain and states ibuprofen and tramadol aren't working.  She states "Dr. De Los Santos gave me something that I don't remember the name of, but made the pain go away quickly".  She states that she doesn't think it is infected and doesn't want antibiotics.  She states that she has been keeping dressings in place, clean, and dry.  Someone is staying with her to help her around.      Review of Systems   Constitutional: Negative.    HENT: Negative.     Eyes: Negative.    Respiratory: Negative.     Cardiovascular:  Positive for leg swelling.   Gastrointestinal: Negative.    Skin:  Positive for wound.   Psychiatric/Behavioral: Negative.         Objective:        Physical Exam  Constitutional:       Appearance: Normal appearance.   HENT:      Head: Normocephalic and atraumatic.      Right Ear: External ear normal.      Left Ear: External ear normal.      Mouth/Throat:      Mouth: Mucous membranes are moist.      Pharynx: Oropharynx is clear.   Eyes:      Extraocular Movements: Extraocular movements intact.      " Conjunctiva/sclera: Conjunctivae normal.      Pupils: Pupils are equal, round, and reactive to light.   Cardiovascular:      Rate and Rhythm: Normal rate and regular rhythm.   Pulmonary:      Effort: Pulmonary effort is normal. No respiratory distress.   Abdominal:      General: There is no distension.      Palpations: Abdomen is soft.   Skin:     General: Skin is warm and dry.      Comments: +DFU to left plantar with mild maceration   Neurological:      Mental Status: She is alert.       Vitals:    12/02/22 1131   BP: 133/70   Pulse: 100   Temp: 98.4 °F (36.9 °C)       Assessment:           ICD-10-CM ICD-9-CM   1. Open wound of left foot  S91.302A 892.0   2. Leg swelling  M79.89 729.81            Wound 04/15/22 2230 Diabetic Ulcer Left medial;plantar Foot (Active)   04/15/22 2230    Pre-existing: Yes   Primary Wound Type: Diabetic ulc   Side: Left   Orientation: medial;plantar   Location: Foot   Wound Number:    Ankle-Brachial Index:    Pulses:    Removal Indication and Assessment:    Wound Outcome:    (Retired) Wound Type:    (Retired) Wound Length (cm):    (Retired) Wound Width (cm):    (Retired) Depth (cm):    Wound Description (Comments):    Removal Indications:    Wound Image   12/02/22 1141   Wound WDL ex 12/02/22 1141   Dressing Appearance Dry;Intact 12/02/22 1141   Drainage Amount Small 12/02/22 1141   Drainage Characteristics/Odor Clear 12/02/22 1141   Appearance Red 12/02/22 1141   Tissue loss description Full thickness 12/02/22 1141   Red (%), Wound Tissue Color 100 % 12/02/22 1141   Wound Edges Callused 12/02/22 1141   Wound Length (cm) 3.5 cm 12/02/22 1141   Wound Width (cm) 4.2 cm 12/02/22 1141   Wound Depth (cm) 0.3 cm 12/02/22 1141   Wound Volume (cm^3) 4.41 cm^3 12/02/22 1141   Wound Surface Area (cm^2) 14.7 cm^2 12/02/22 1141   Care Cleansed with:;Antimicrobial agent;Sterile normal saline 12/02/22 1141   Dressing Changed 12/02/22 1141   Off Loading Football dressing;Off loading shoe 12/02/22 7021    Dressing Change Due 12/09/22 12/02/22 1141           Plan:              Tissue pathology and/or culture taken     [] Yes      [x] No  Sharp debridement performed                   [] Yes       [x] No  Labs ordered     [] Yes       [x] No  Imaging ordered    [] Yes      [x] No    Orders Placed This Encounter   Procedures    Change dressing     Change dressing       Clean with NS.   Left foot: Gentian violet to periwound. Antibiotic powder (pt's own, cefepime) then Endoform to wound bed. Mextra short x1, Alex foam long x2. Football dressing (cast padding x3). Coban.      Patient to change dressing on Tuesday then daily: Clean with NS. Apply antibiotic powder to wound bed. Cover with foam border. Return to clinic in 1 week.        Follow up in about 1 week (around 12/9/2022).     Aiden Oleary MD

## 2022-12-04 ENCOUNTER — HOSPITAL ENCOUNTER (EMERGENCY)
Facility: HOSPITAL | Age: 52
Discharge: HOME | End: 2022-12-04
Attending: EMERGENCY MEDICINE
Payer: MEDICAID

## 2022-12-04 VITALS
SYSTOLIC BLOOD PRESSURE: 159 MMHG | WEIGHT: 245 LBS | HEART RATE: 89 BPM | RESPIRATION RATE: 19 BRPM | HEIGHT: 66 IN | OXYGEN SATURATION: 96 % | BODY MASS INDEX: 39.37 KG/M2 | TEMPERATURE: 98 F | DIASTOLIC BLOOD PRESSURE: 75 MMHG

## 2022-12-04 DIAGNOSIS — L89.892 PRESSURE INJURY OF LEFT FOOT, STAGE 2: ICD-10-CM

## 2022-12-04 DIAGNOSIS — G89.29 OTHER CHRONIC PAIN: Primary | ICD-10-CM

## 2022-12-04 PROCEDURE — 63600175 PHARM REV CODE 636 W HCPCS: Performed by: EMERGENCY MEDICINE

## 2022-12-04 PROCEDURE — 99284 EMERGENCY DEPT VISIT MOD MDM: CPT | Mod: 25

## 2022-12-04 PROCEDURE — 96372 THER/PROPH/DIAG INJ SC/IM: CPT | Performed by: EMERGENCY MEDICINE

## 2022-12-04 RX ORDER — KETOROLAC TROMETHAMINE 30 MG/ML
15 INJECTION, SOLUTION INTRAMUSCULAR; INTRAVENOUS
Status: COMPLETED | OUTPATIENT
Start: 2022-12-04 | End: 2022-12-04

## 2022-12-04 RX ADMIN — KETOROLAC TROMETHAMINE 15 MG: 30 INJECTION, SOLUTION INTRAMUSCULAR at 11:12

## 2022-12-05 NOTE — ED NOTES
New dressing applied per MD verbal orders. Pt tolerated well. DuoDerm dressing and kerlex applied.

## 2022-12-05 NOTE — DISCHARGE INSTRUCTIONS
Have discussion with your wound care physician about the need for additional dressing changes as this wound was saturated with a clear discharge.  Your wound appears to be healing fine at this point time there is no signs of any overwhelming infection at the present time.

## 2022-12-05 NOTE — ED PROVIDER NOTES
Encounter Date: 12/4/2022    SCRIBE #1 NOTE: I, Behzadpaula Arredondo, am scribing for, and in the presence of,  Corey Vizcarra MD. I have scribed the following portions of the note - Other sections scribed: HPI, ROS, PE.   SCRIBE #2 NOTE: I, Edmonddavis Ge, am scribing for, and in the presence of,  Corey Vizcarra MD. I have scribed the remaining portions of the note not scribed by Scribe #1.   History     Chief Complaint   Patient presents with    foot pain     Pt has a chronic pressure ulcer to left foot and is under the care of wound care. She reports the pain got worse tonight.      Audrey Natarajan is a 52 y.o. female, with a PMHx of HTN, DM, Hypercholesteremia, DVT, and Diabetic foot ulcers, who presents to the ED for pain management of chronic wound in her left foot beginning today. Patient has been under the care of Wound Care two days ago and saw her PCP for her pain four weeks ago PTA, endorsing she received tramadol with some relief but notes her complaints have exacerbated as of today. Tylenol attempted for treatment with no immediate relief noted. No other medications taken PTA. No other exacerbating or alleviating factors. Denies CP, SOB, numbness, weakness, or other associated symptoms. Allergic to Morphine, Penicillins, Januvia, and Carbamazepine.      The history is provided by the patient. No  was used.   Review of patient's allergies indicates:   Allergen Reactions    Morphine Other (See Comments)     Patient had a psychotic episode after taking Morphine  Agitation, hallucinations    Penicillins Anaphylaxis     itching    Januvia [sitagliptin] Hives    Carbamazepine Other (See Comments)     hyponatremia     Past Medical History:   Diagnosis Date    ADHD (attention deficit hyperactivity disorder)     Arthritis     Asthma     Bipolar 1 disorder     Cataract     Cigarette smoker     COPD (chronic obstructive pulmonary disease)     Coronary artery disease     A fib    Depression  "    bipolar manic depresson    Diabetes mellitus     Diabetic foot ulcers     Diabetic neuropathy     DVT of lower extremity, bilateral 2013    bilateral LE DVT. Estelita filter placed.     Encounter for blood transfusion     History of blood clots 1. Left Leg=; 2.Bilateral Groin=Blood Clots=  or 2013 & 2013; 3. LLL of Lung=2013;  4. Lt. Lower Leg=2013.     Pt. had 1st Blood Clot after [place]=9002, & Last=. Estelita Filter= Rt.Lateral Neck.    HTN (hypertension) 2013    Pt states that she does not have hypertension    Hypercholesteremia     Irregular heartbeat     Neuromuscular disorder     neuropathy feet    Obese     PE (pulmonary embolism) 2013    bilat LE DVT.     Restless leg syndrome      Past Surgical History:   Procedure Laterality Date    ABDOMINAL SURGERY      gastric sleeve    BILATERAL OOPHORECTOMY Bilateral 2015    CHOLECYSTECTOMY      DEBRIDEMENT OF FOOT Bilateral 5/10/2022    Procedure: DEBRIDEMENT, FOOT;  Surgeon: Maira De Los Santos DPM;  Location: [facility] OR;  Service: Podiatry;  Laterality: Bilateral;    Green' s filter Right 2012    Right Neck & Tunneled Down.    HERNIA REPAIR      "[place] of Hernias Repaires around th [place].", pt. states    LAPAROSCOPIC CHOLECYSTECTOMY N/A 9/10/2020    Procedure: CHOLECYSTECTOMY, LAPAROSCOPIC;  Surgeon: Montrell Gutierrez MD;  Location: [facility] OR;  Service: General;  Laterality: N/A;  RN PREOP ----COVID Negative      OVARIAN CYST REMOVAL  3/13/2014    [place] REMOVAL OF OVARY/TUBE(S)      SPLENECTOMY, TOTAL  2003    TONSILLECTOMY      as a child    TYMPANOSTOMY TUBE PLACEMENT  1976    VEIN SURGERY      Lt leg     Family History   Problem Relation Age of Onset    Hypertension Father     Diabetes Father     Heart disease Father     Cataracts Father     Diabetes Paternal Grandfather     Heart disease Paternal Grandfather     No Known Problems Mother     Ovarian cancer Maternal Grandmother          from " this. ? age     No Known Problems Sister     No Known Problems Brother     No Known Problems Maternal Aunt     No Known Problems Maternal Uncle     No Known Problems Paternal Aunt     No Known Problems Paternal Uncle     No Known Problems Maternal Grandfather     Ovarian cancer Paternal Grandmother     Uterine cancer Neg Hx     Breast cancer Neg Hx     Colon cancer Neg Hx     Amblyopia Neg Hx     Blindness Neg Hx     Cancer Neg Hx     Glaucoma Neg Hx     Macular degeneration Neg Hx     Retinal detachment Neg Hx     Strabismus Neg Hx     Stroke Neg Hx     Thyroid disease Neg Hx      Social History     Tobacco Use    Smoking status: Every Day     Packs/day: 1.00     Years: 37.00     Pack years: 37.00     Types: Cigarettes     Last attempt to quit: 2020     Years since quittin.9    Smokeless tobacco: Never    Tobacco comments:     Enrolled in the [employer] on 5/3/14 ([facility] Member ID # [id number]). Ambulatory referral to Smoking Cessation Program   Substance Use Topics    Alcohol use: No     Alcohol/week: 0.0 standard drinks    Drug use: No     Review of Systems   Constitutional: Negative.    HENT: Negative.     Eyes: Negative.    Respiratory: Negative.     Cardiovascular: Negative.    Gastrointestinal: Negative.    Endocrine: Negative.    Genitourinary: Negative.    Musculoskeletal: Negative.    Skin:  Positive for wound (chronic, left foot).   Allergic/Immunologic: Negative.    Neurological: Negative.    Hematological: Negative.    Psychiatric/Behavioral: Negative.     All other systems reviewed and are negative.    Physical Exam     Initial Vitals [227]   BP Pulse Resp Temp SpO2   (!) 160/80 100 16 97.9 °F (36.6 °C) 98 %      MAP       --         Physical Exam    Nursing note and vitals reviewed.  Constitutional: Vital signs are normal. She appears well-developed and well-nourished. She is not diaphoretic. She is active and cooperative. No distress.   HENT:   Head: Normocephalic and atraumatic.    Eyes: Conjunctivae, EOM and lids are normal. Pupils are equal, round, and reactive to light.   Neck: Trachea normal. Neck supple. No thyroid mass present.    Full passive range of motion without pain.     Cardiovascular:  Normal rate, regular rhythm, S1 normal, S2 normal, normal heart sounds, intact distal pulses and normal pulses.           Pulmonary/Chest: Effort normal and breath sounds normal. No respiratory distress.   Abdominal: Abdomen is soft. Bowel sounds are normal.   Musculoskeletal:         General: Normal range of motion.      Cervical back: Full passive range of motion without pain and neck supple.      Comments: Stage 3 pressure ulcer to left plantar foot noted. Wound is 4.5 cm x 3 cm in diameter and extends to the subcutaneous tissue. No fissure, tracking, surrounding erythema, or streaking of the leg noted. Absorptive pad x2 in left foot noted.      Lymphadenopathy:     She has no axillary adenopathy.   Neurological: She is alert and oriented to person, place, and time.   Skin: Skin is warm, dry and intact.   Psychiatric: She has a normal mood and affect. Her speech is normal and behavior is normal. Judgment and thought content normal. Cognition and memory are normal.       ED Course   Procedures  Labs Reviewed - No data to display       Imaging Results    None          Medications   ketorolac injection 15 mg (15 mg Intramuscular Given 12/4/22 2306)     Medical Decision Making:   History:   Old Medical Records: I decided to obtain old medical records.        Scribe Attestation:   Scribe #1: I performed the above scribed service and the documentation accurately describes the services I performed. I attest to the accuracy of the note.  Scribe #2: I performed the above scribed service and the documentation accurately describes the services I performed. I attest to the accuracy of the note.                   Clinical Impression:   Final diagnoses:  [G89.29] Other chronic pain (Primary)  [L89.892]  Pressure injury of left foot, stage 2        ED Disposition Condition    Discharge Stable          ED Prescriptions    None       Follow-up Information       Follow up With Specialties Details Why Contact Info    Contact your wound care specialist tomorrow                I, Corey Vizcarra MD , personally performed the services described in this documentation. All medical record entries made by the scribe were at my direction and in my presence. I have reviewed the chart and agree that the record reflects my personal performance and is accurate and complete.       Corey Vizcarra MD  12/05/22 0416

## 2022-12-08 ENCOUNTER — TELEPHONE (OUTPATIENT)
Dept: WOUND CARE | Facility: HOSPITAL | Age: 52
End: 2022-12-08
Payer: MEDICAID

## 2022-12-09 ENCOUNTER — HOSPITAL ENCOUNTER (OUTPATIENT)
Dept: WOUND CARE | Facility: HOSPITAL | Age: 52
Discharge: HOME | End: 2022-12-09
Attending: PODIATRIST
Payer: MEDICAID

## 2022-12-09 VITALS — TEMPERATURE: 98 F | HEART RATE: 86 BPM | DIASTOLIC BLOOD PRESSURE: 67 MMHG | SYSTOLIC BLOOD PRESSURE: 146 MMHG

## 2022-12-09 DIAGNOSIS — E11.49 TYPE II DIABETES MELLITUS WITH NEUROLOGICAL MANIFESTATIONS: Primary | ICD-10-CM

## 2022-12-09 DIAGNOSIS — L97.423 LEFT MIDFOOT ULCER, WITH NECROSIS OF MUSCLE: ICD-10-CM

## 2022-12-09 DIAGNOSIS — M79.662 PAIN AND SWELLING OF LEFT LOWER LEG: ICD-10-CM

## 2022-12-09 DIAGNOSIS — M79.89 PAIN AND SWELLING OF LEFT LOWER LEG: ICD-10-CM

## 2022-12-09 DIAGNOSIS — M79.89 PAIN AND SWELLING OF LOWER LEG, LEFT: ICD-10-CM

## 2022-12-09 DIAGNOSIS — M79.662 PAIN AND SWELLING OF LOWER LEG, LEFT: ICD-10-CM

## 2022-12-09 DIAGNOSIS — I87.2 VENOUS INSUFFICIENCY: Primary | ICD-10-CM

## 2022-12-09 DIAGNOSIS — S91.302A OPEN WOUND OF LEFT FOOT: ICD-10-CM

## 2022-12-09 PROCEDURE — 99499 UNLISTED E&M SERVICE: CPT | Mod: ,,, | Performed by: PODIATRIST

## 2022-12-09 PROCEDURE — 11042 DBRDMT SUBQ TIS 1ST 20SQCM/<: CPT | Mod: ,,, | Performed by: PODIATRIST

## 2022-12-09 PROCEDURE — 11042 WOUND DEBRIDEMENT: ICD-10-PCS | Mod: ,,, | Performed by: PODIATRIST

## 2022-12-09 PROCEDURE — 11042 DBRDMT SUBQ TIS 1ST 20SQCM/<: CPT | Performed by: PODIATRIST

## 2022-12-09 PROCEDURE — 99499 NO LOS: ICD-10-PCS | Mod: ,,, | Performed by: PODIATRIST

## 2022-12-09 RX ORDER — LEVOFLOXACIN 750 MG/1
750 TABLET ORAL DAILY
Qty: 20 TABLET | Refills: 0 | Status: ON HOLD | OUTPATIENT
Start: 2022-12-09 | End: 2022-12-27 | Stop reason: HOSPADM

## 2022-12-09 NOTE — PROGRESS NOTES
"    [facility]                Progress Note    Subjective:       Patient ID: Audrey Natarajan is a 52 y.o. female.    Chief Complaint: No chief complaint on file.      Walked to clinic with less pain than last week yet ratespain remains at 9/10. Has been out at parade "in w/c" and light callus around wound     Review of Systems   Constitutional:  Positive for activity change. Negative for appetite change, chills and fever.   Respiratory:  Negative for cough and shortness of breath.    Cardiovascular:  Positive for leg swelling. Negative for chest pain.   Gastrointestinal:  Negative for diarrhea, nausea and vomiting.   Musculoskeletal:  Positive for arthralgias and myalgias.   Skin:  Positive for wound.   Neurological:  Positive for numbness. Negative for weakness.        + paresthesia        Objective:        Physical Exam  Nursing note reviewed.   Constitutional:       General: She is not in acute distress.     Appearance: She is not toxic-appearing or diaphoretic.   Cardiovascular:      Pulses:           Dorsalis pedis pulses are 1+ on the right side and 1+ on the left side.        Posterior tibial pulses are 2+ on the right side and 2+ on the left side.   Pulmonary:      Effort: No respiratory distress.   Musculoskeletal:      Right lower leg: Edema present.      Left lower leg: Edema present.      Right ankle: Swelling present. No lateral malleolus, medial malleolus, AITF ligament, CF ligament or posterior TF ligament tenderness. Decreased range of motion.      Right Achilles Tendon: No defects. Paniagua's test negative.      Left ankle: Swelling present. No lateral malleolus, medial malleolus, AITF ligament, CF ligament, posterior TF ligament or proximal fibula tenderness. Decreased range of motion.      Left Achilles Tendon: No defects. Paniagua's test negative.      Right foot: Swelling and tenderness present.      Left foot: Swelling, Charcot foot and " tenderness present.      Comments: There is equinus deformity bilateral with decreased dorsiflexion at the ankle joint bilateral    Decreased first MPJ range of motion both weightbearing and nonweightbearing, no crepitus observed the first MP joint, + dorsal flag sign. Mild  bunion deformity is observed .    Patient has hammertoes of digits 2-5 bilateral partially reducible without symptom today.     Skin:     General: Skin is warm and dry.      Coloration: Skin is not pale.      Findings: Erythema and wound (see below) present. No laceration.      Nails: There is no clubbing.   Neurological:      Sensory: No sensory deficit.      Motor: No tremor, atrophy or abnormal muscle tone.      Deep Tendon Reflexes: Reflexes are normal and symmetric.      Comments: Paresthesias, and hyperesthesia bilateral feet at toes with no clearly identified trigger or source.    [place]-Gilberto 5.07 monofilament is intact bilateral feet. Sharp/dull sensation is also intact Bilateral feet.   Psychiatric:         Attention and Perception: She is attentive.         Mood and Affect: Mood is not anxious. Affect is not inappropriate.         Speech: She is communicative. Speech is not slurred.         Behavior: Behavior is not combative.       Vitals:    12/09/22 1115   BP: (!) 146/67   Pulse: 86   Temp: 98.2 °F (36.8 °C)       Assessment:           ICD-10-CM ICD-9-CM   1. Type II diabetes mellitus with neurological manifestations  E11.49 250.60   2. Left midfoot ulcer, with necrosis of muscle  L97.423 707.14     728.89   3. Pain and swelling of left lower leg  M79.662 729.5    M79.89 729.81   4. Pain and swelling of lower leg, left  M79.662 729.5    M79.89 729.81   5. Open wound of left foot  S91.302A 892.0            Wound 04/15/22 2230 Diabetic Ulcer Left medial;plantar Foot (Active)   04/15/22 2230    Pre-existing: Yes   Primary Wound Type: Diabetic ulc   Side: Left   Orientation: medial;plantar   Location: Foot   Wound Number:     Ankle-Brachial Index:    Pulses:    Removal Indication and Assessment:    Wound Outcome:    (Retired) Wound Type:    (Retired) Wound Length (cm):    (Retired) Wound Width (cm):    (Retired) Depth (cm):    Wound Description (Comments):    Removal Indications:    Wound Image   12/09/22 1247   Wound WDL ex 12/09/22 1247   Dressing Appearance Dry;Intact 12/09/22 1247   Drainage Amount Moderate 12/09/22 1247   Appearance Pink 12/09/22 1247   Tissue loss description Partial thickness 12/09/22 1247   Red (%), Wound Tissue Color 100 % 12/09/22 1247   Periwound Area Dry 12/09/22 1247   Wound Edges Callused 12/09/22 1247   Wound Length (cm) 4 cm 12/09/22 1247   Wound Width (cm) 4 cm 12/09/22 1247   Wound Depth (cm) 0.3 cm 12/09/22 1247   Wound Volume (cm^3) 4.8 cm^3 12/09/22 1247   Wound Surface Area (cm^2) 16 cm^2 12/09/22 1247   Care Cleansed with:;Antimicrobial agent;Sterile normal saline 12/09/22 1247   Dressing Changed 12/09/22 1247   Off Loading Football dressing;Off loading shoe 12/09/22 1247   Dressing Change Due 12/16/22 12/09/22 1247           Plan:              Wound Debridement    Date/Time: 12/9/2022 10:55 AM  Performed by: Maira De Los Santos DPM  Authorized by: Maira De Los Santos DPM       Wound Details:    Location:  Left foot    Location:  Left Midfoot    Type of Debridement:  Excisional       Length (cm):  4       Area (sq cm):  16       Width (cm):  4       Percent Debrided (%):  100       Depth (cm):  0.3       Total Area Debrided (sq cm):  16    Depth of debridement:  Subcutaneous tissue    Tissue debrided:  Dermis, Epidermis and Subcutaneous    Devitalized tissue debrided:  Fibrin and Callus    Instruments:  Curette    Bleeding:  Minimal  Hemostasis Achieved: Yes    Method Used:  Pressure  Patient tolerance:  Patient tolerated the procedure well with no immediate complications    Tissue pathology and/or culture taken     [] Yes      [x] No  Sharp debridement performed                   [x] Yes       [] No  Labs  ordered     [] Yes       [x] No  Imaging ordered    [x] Yes      [] No    Orders Placed This Encounter   Procedures    Debridement     This order was created via procedure documentation     Standing Status:   Standing     Number of Occurrences:   1    Change dressing     Change dressing       Clean with NS.   Left foot: Gentian violet to periwound. Antibiotic powder (pt's own, cefepime) then Endoform to wound bed. Mextra short x1, Alex foam long x2. Football dressing (cast padding x3). Coban.       Patient to change dressing on Tuesday then daily: Clean with NS. Apply antibiotic powder to wound bed. Cover with foam border. Return to clinic in 1 week.        Follow up in about 1 week (around 12/16/2022).

## 2022-12-16 ENCOUNTER — HOSPITAL ENCOUNTER (OUTPATIENT)
Dept: RADIOLOGY | Facility: HOSPITAL | Age: 52
Discharge: HOME | End: 2022-12-16
Attending: PODIATRIST
Payer: MEDICAID

## 2022-12-16 ENCOUNTER — HOSPITAL ENCOUNTER (OUTPATIENT)
Dept: WOUND CARE | Facility: HOSPITAL | Age: 52
Discharge: HOME | End: 2022-12-16
Attending: PODIATRIST
Payer: MEDICAID

## 2022-12-16 VITALS — HEART RATE: 82 BPM | TEMPERATURE: 98 F | SYSTOLIC BLOOD PRESSURE: 169 MMHG | DIASTOLIC BLOOD PRESSURE: 74 MMHG

## 2022-12-16 DIAGNOSIS — G89.29 CHRONIC BILATERAL LOW BACK PAIN WITH LEFT-SIDED SCIATICA: ICD-10-CM

## 2022-12-16 DIAGNOSIS — L97.423 LEFT MIDFOOT ULCER, WITH NECROSIS OF MUSCLE: ICD-10-CM

## 2022-12-16 DIAGNOSIS — M14.672 CHARCOT'S JOINT OF LEFT FOOT: ICD-10-CM

## 2022-12-16 DIAGNOSIS — M79.662 PAIN AND SWELLING OF LEFT LOWER LEG: ICD-10-CM

## 2022-12-16 DIAGNOSIS — B35.9 TINEA: ICD-10-CM

## 2022-12-16 DIAGNOSIS — S91.302A OPEN WOUND OF LEFT FOOT: ICD-10-CM

## 2022-12-16 DIAGNOSIS — E11.621 DIABETIC ULCER OF LEFT MIDFOOT ASSOCIATED WITH TYPE 2 DIABETES MELLITUS, LIMITED TO BREAKDOWN OF SKIN: ICD-10-CM

## 2022-12-16 DIAGNOSIS — M79.89 PAIN AND SWELLING OF LEFT LOWER LEG: ICD-10-CM

## 2022-12-16 DIAGNOSIS — M54.42 CHRONIC BILATERAL LOW BACK PAIN WITH LEFT-SIDED SCIATICA: ICD-10-CM

## 2022-12-16 DIAGNOSIS — L97.421 DIABETIC ULCER OF LEFT MIDFOOT ASSOCIATED WITH TYPE 2 DIABETES MELLITUS, LIMITED TO BREAKDOWN OF SKIN: ICD-10-CM

## 2022-12-16 DIAGNOSIS — E11.49 TYPE II DIABETES MELLITUS WITH NEUROLOGICAL MANIFESTATIONS: Primary | ICD-10-CM

## 2022-12-16 PROCEDURE — 73630 X-RAY EXAM OF FOOT: CPT | Mod: TC,FY,LT

## 2022-12-16 PROCEDURE — 73630 X-RAY EXAM OF FOOT: CPT | Mod: 26,LT,, | Performed by: INTERNAL MEDICINE

## 2022-12-16 PROCEDURE — 99214 OFFICE O/P EST MOD 30 MIN: CPT | Mod: ,,, | Performed by: PODIATRIST

## 2022-12-16 PROCEDURE — 11042 DBRDMT SUBQ TIS 1ST 20SQCM/<: CPT | Performed by: PODIATRIST

## 2022-12-16 PROCEDURE — 99215 OFFICE O/P EST HI 40 MIN: CPT | Performed by: PODIATRIST

## 2022-12-16 PROCEDURE — 73630 XR FOOT COMPLETE 3 VIEW LEFT: ICD-10-PCS | Mod: 26,LT,, | Performed by: INTERNAL MEDICINE

## 2022-12-16 PROCEDURE — 99214 PR OFFICE/OUTPT VISIT, EST, LEVL IV, 30-39 MIN: ICD-10-PCS | Mod: ,,, | Performed by: PODIATRIST

## 2022-12-16 NOTE — PROGRESS NOTES
[facility]                Progress Note    Subjective:       Patient ID: Audrey Natarajan is a 52 y.o. female.    Chief Complaint: Wound Check    Walked to clinic unaided accompanied by friend. Strong smell of cigarette smoke, States has significantly cut down aware of effect of smoking on wound healing. Had bandaid and tape on wound, states had to change on Monday. Continues with swelling in foot. Aware of need to elevate. Denies nausea, vomiting, fever, chills but endorsees increased pain.     Review of Systems   Constitutional:  Positive for activity change. Negative for appetite change, chills and fever.   Respiratory:  Positive for cough (smoker). Negative for shortness of breath.    Cardiovascular:  Positive for leg swelling. Negative for chest pain.   Gastrointestinal:  Negative for diarrhea, nausea and vomiting.   Musculoskeletal:  Positive for arthralgias and myalgias.   Skin:  Positive for wound.   Neurological:  Positive for numbness. Negative for weakness.        + paresthesia        Objective:        Physical Exam  Nursing note reviewed.   Constitutional:       General: She is not in acute distress.     Appearance: She is not toxic-appearing or diaphoretic.   Cardiovascular:      Pulses:           Dorsalis pedis pulses are 1+ on the right side and 1+ on the left side.        Posterior tibial pulses are 2+ on the right side and 2+ on the left side.   Pulmonary:      Effort: No respiratory distress.   Musculoskeletal:      Right lower leg: Edema present.      Left lower leg: Edema present.      Right ankle: Swelling present. No lateral malleolus, medial malleolus, AITF ligament, CF ligament or posterior TF ligament tenderness. Decreased range of motion.      Right Achilles Tendon: No defects. Paniagua's test negative.      Left ankle: Swelling present. No lateral malleolus, medial malleolus, AITF ligament, CF ligament, posterior TF ligament or proximal  fibula tenderness. Decreased range of motion.      Left Achilles Tendon: No defects. Paniagua's test negative.      Right foot: Swelling and tenderness present.      Left foot: Swelling, Charcot foot and tenderness present.      Comments: There is equinus deformity bilateral with decreased dorsiflexion at the ankle joint bilateral    Decreased first MPJ range of motion both weightbearing and nonweightbearing, no crepitus observed the first MP joint, + dorsal flag sign. Mild  bunion deformity is observed .    Patient has hammertoes of digits 2-5 bilateral partially reducible without symptom today.     Skin:     General: Skin is warm and dry.      Coloration: Skin is not pale.      Findings: Erythema and wound (see below) present. No laceration.      Nails: There is no clubbing.   Neurological:      Sensory: No sensory deficit.      Motor: No tremor, atrophy or abnormal muscle tone.      Deep Tendon Reflexes: Reflexes are normal and symmetric.      Comments: Paresthesias, and hyperesthesia bilateral feet at toes with no clearly identified trigger or source.    [place]-Gilberto 5.07 monofilament is intact bilateral feet. Sharp/dull sensation is also intact Bilateral feet.   Psychiatric:         Attention and Perception: She is attentive.         Mood and Affect: Mood is not anxious. Affect is not inappropriate.         Speech: She is communicative. Speech is not slurred.         Behavior: Behavior is not combative.       Vitals:    12/16/22 1110   BP: (!) 169/74   Pulse: 82   Temp: 97.7 °F (36.5 °C)       Assessment:           ICD-10-CM ICD-9-CM   1. Type II diabetes mellitus with neurological manifestations  E11.49 250.60   2. Left midfoot ulcer, with necrosis of muscle  L97.423 707.14     728.89   3. Charcot's joint of left foot  M14.672 094.0     713.5   4. Pain and swelling of left lower leg  M79.662 729.5    M79.89 729.81   5. Open wound of left foot  S91.302A 892.0   6. Diabetic ulcer of left midfoot associated  with type 2 diabetes mellitus, limited to breakdown of skin  E11.621 250.80    L97.421 707.14            Wound 04/15/22 2230 Diabetic Ulcer Left medial;plantar Foot (Active)   04/15/22 2230    Pre-existing: Yes   Primary Wound Type: Diabetic ulc   Side: Left   Orientation: medial;plantar   Location: Foot   Wound Number:    Ankle-Brachial Index:    Pulses:    Removal Indication and Assessment:    Wound Outcome:    (Retired) Wound Type:    (Retired) Wound Length (cm):    (Retired) Wound Width (cm):    (Retired) Depth (cm):    Wound Description (Comments):    Removal Indications:    Wound Image   12/16/22 1113   Wound WDL ex 12/16/22 1113   Dressing Appearance Dried drainage 12/16/22 1113   Drainage Amount Moderate 12/16/22 1113   Drainage Characteristics/Odor Serous;No odor 12/16/22 1113   Appearance Pink;Yellow 12/16/22 1113   Tissue loss description Full thickness 12/16/22 1113   Black (%), Wound Tissue Color 0 % 12/16/22 1113   Red (%), Wound Tissue Color 95 % 12/16/22 1113   Yellow (%), Wound Tissue Color 5 % 12/16/22 1113   Periwound Area Macerated 12/16/22 1113   Wound Edges Callused 12/16/22 1113   Wound Length (cm) 3.5 cm 12/16/22 1113   Wound Width (cm) 4.9 cm 12/16/22 1113   Wound Depth (cm) 0.4 cm 12/16/22 1113   Wound Volume (cm^3) 6.86 cm^3 12/16/22 1113   Wound Surface Area (cm^2) 17.15 cm^2 12/16/22 1113   Tunneling (depth (cm)/location) 0 12/16/22 1113   Undermining (depth (cm)/location) 0 12/16/22 1113   Care Cleansed with:;Antimicrobial agent;Sterile normal saline 12/16/22 1113   Dressing Changed 12/16/22 1113   Off Loading Football dressing;Off loading shoe 12/16/22 1113   Dressing Change Due 12/23/22 12/16/22 1113           Plan:            Wound appears to receiving more pressure than needed and therefore depth increasing and wound outpouching centrally.  Long kang discussion about how darco shoe does not appropriately offload a charcot foot and if she insist on the shoe instead of the boot she  "must take less steps or be at risk for BKA.    Discussed at length number of steps pt takes and is aware that each step is causing further damage. She states that she will purchase knee scooter but aware may stress other foot. Will purchase Skin Integra lotion to moisturize right foot.     Will have xray today of foot to rule out gas in soft tissues      Tissue pathology and/or culture taken     [] Yes      [x] No  Sharp debridement performed                   [] Yes       [x] No  Labs ordered     [] Yes       [x] No  Imaging ordered    [x] Yes      [] No    Orders Placed This Encounter   Procedures    WOUND SUPPLIES FOR HOME USE     Pt will change dressing every Tuesday, Wednesday and Thursday.  Apply own  abx  powder  Mextra 4 x 4 and cast padding wrap secured with paper tape.     Order Specific Question:   Height:     Answer:   5'6"     Order Specific Question:   Weight:     Answer:   39.5KG     Order Specific Question:   Does patient have medical equipment at home?     Answer:   none     Order Specific Question:   Length of need (1-99 months):     Answer:   99    X-Ray Foot Complete Left     Weight bearing     Standing Status:   Future     Number of Occurrences:   1     Standing Expiration Date:   12/16/2023    Change dressing     Change dressing       Clean with NS.   Left foot: Gentian violet to periwound. Antibiotic powder (pt's own, cefepime) then Endoform to wound bed. Mextra short x1, Alex foam long x2. Football dressing (cast padding x3). Coban.       Patient to change dressing on Tuesday then daily: Clean with NS. Apply antibiotic powder to wound bed. Cover with foam border. Return to clinic in 1 week.        Follow up in about 1 week (around 12/23/2022).         "

## 2022-12-16 NOTE — TELEPHONE ENCOUNTER
No new care gaps identified.  [facility] Embedded Care Gaps. Reference number: [id number]. 12/16/2022   5:46:27 PM CST

## 2022-12-17 RX ORDER — TRAMADOL HYDROCHLORIDE 50 MG/1
TABLET ORAL
Qty: 28 TABLET | OUTPATIENT
Start: 2022-12-17

## 2022-12-17 RX ORDER — NYSTATIN 100000 [USP'U]/G
POWDER TOPICAL
Qty: 60 G | Refills: 0 | Status: SHIPPED | OUTPATIENT
Start: 2022-12-17 | End: 2023-11-13

## 2022-12-21 ENCOUNTER — TELEPHONE (OUTPATIENT)
Dept: WOUND CARE | Facility: HOSPITAL | Age: 52
End: 2022-12-21
Payer: MEDICAID

## 2022-12-22 ENCOUNTER — HOSPITAL ENCOUNTER (INPATIENT)
Facility: HOSPITAL | Age: 52
LOS: 5 days | Discharge: HOME | DRG: 623 | End: 2022-12-27
Attending: EMERGENCY MEDICINE | Admitting: HOSPITALIST
Payer: MEDICAID

## 2022-12-22 ENCOUNTER — TELEPHONE (OUTPATIENT)
Dept: WOUND CARE | Facility: HOSPITAL | Age: 52
End: 2022-12-22
Payer: MEDICAID

## 2022-12-22 ENCOUNTER — HOSPITAL ENCOUNTER (OUTPATIENT)
Dept: WOUND CARE | Facility: HOSPITAL | Age: 52
Discharge: HOME | End: 2022-12-22
Attending: PODIATRIST
Payer: MEDICAID

## 2022-12-22 ENCOUNTER — DOCUMENTATION ONLY (OUTPATIENT)
Dept: FAMILY MEDICINE | Facility: CLINIC | Age: 52
End: 2022-12-22
Payer: MEDICAID

## 2022-12-22 VITALS — SYSTOLIC BLOOD PRESSURE: 174 MMHG | DIASTOLIC BLOOD PRESSURE: 76 MMHG | HEART RATE: 112 BPM | TEMPERATURE: 98 F

## 2022-12-22 DIAGNOSIS — L97.421 DIABETIC ULCER OF LEFT MIDFOOT ASSOCIATED WITH TYPE 2 DIABETES MELLITUS, LIMITED TO BREAKDOWN OF SKIN: ICD-10-CM

## 2022-12-22 DIAGNOSIS — R60.9 SWELLING: ICD-10-CM

## 2022-12-22 DIAGNOSIS — R00.0 TACHYCARDIA: ICD-10-CM

## 2022-12-22 DIAGNOSIS — L97.423 LEFT MIDFOOT ULCER, WITH NECROSIS OF MUSCLE: ICD-10-CM

## 2022-12-22 DIAGNOSIS — L03.119 CELLULITIS AND ABSCESS OF FOOT, EXCEPT TOES: ICD-10-CM

## 2022-12-22 DIAGNOSIS — M14.672 CHARCOT'S JOINT OF LEFT FOOT: ICD-10-CM

## 2022-12-22 DIAGNOSIS — L03.90 WOUND CELLULITIS: ICD-10-CM

## 2022-12-22 DIAGNOSIS — E11.621 DIABETIC ULCER OF LEFT MIDFOOT ASSOCIATED WITH TYPE 2 DIABETES MELLITUS, LIMITED TO BREAKDOWN OF SKIN: ICD-10-CM

## 2022-12-22 DIAGNOSIS — L97.423 LEFT MIDFOOT ULCER, WITH NECROSIS OF MUSCLE: Primary | ICD-10-CM

## 2022-12-22 DIAGNOSIS — E11.49 TYPE II DIABETES MELLITUS WITH NEUROLOGICAL MANIFESTATIONS: ICD-10-CM

## 2022-12-22 DIAGNOSIS — L02.619 CELLULITIS AND ABSCESS OF FOOT, EXCEPT TOES: ICD-10-CM

## 2022-12-22 DIAGNOSIS — M86.179 OTHER ACUTE OSTEOMYELITIS, UNSPECIFIED ANKLE AND FOOT: Primary | ICD-10-CM

## 2022-12-22 DIAGNOSIS — S91.302A OPEN WOUND OF LEFT FOOT, INITIAL ENCOUNTER: ICD-10-CM

## 2022-12-22 DIAGNOSIS — R07.9 CHEST PAIN: ICD-10-CM

## 2022-12-22 LAB
ALBUMIN SERPL BCP-MCNC: 3.1 G/DL (ref 3.5–5.2)
ALLENS TEST: ABNORMAL
ALP SERPL-CCNC: 88 U/L (ref 55–135)
ALT SERPL W/O P-5'-P-CCNC: 7 U/L (ref 10–44)
ANION GAP SERPL CALC-SCNC: 16 MMOL/L (ref 8–16)
AST SERPL-CCNC: 6 U/L (ref 10–40)
BASOPHILS # BLD AUTO: 0.09 K/UL (ref 0–0.2)
BASOPHILS NFR BLD: 0.5 % (ref 0–1.9)
BILIRUB SERPL-MCNC: 0.3 MG/DL (ref 0.1–1)
BILIRUB UR QL STRIP: NEGATIVE
BNP SERPL-MCNC: 66 PG/ML (ref 0–99)
BUN SERPL-MCNC: 11 MG/DL (ref 6–20)
CALCIUM SERPL-MCNC: 8.7 MG/DL (ref 8.7–10.5)
CHLORIDE SERPL-SCNC: 98 MMOL/L (ref 95–110)
CLARITY UR: CLEAR
CO2 SERPL-SCNC: 23 MMOL/L (ref 23–29)
COLOR UR: YELLOW
CREAT SERPL-MCNC: 0.7 MG/DL (ref 0.5–1.4)
CRP SERPL-MCNC: 74 MG/L (ref 0–8.2)
CTP QC/QA: YES
CTP QC/QA: YES
DIFFERENTIAL METHOD: ABNORMAL
EOSINOPHIL # BLD AUTO: 0.3 K/UL (ref 0–0.5)
EOSINOPHIL NFR BLD: 1.7 % (ref 0–8)
ERYTHROCYTE [DISTWIDTH] IN BLOOD BY AUTOMATED COUNT: 16.9 % (ref 11.5–14.5)
ERYTHROCYTE [SEDIMENTATION RATE] IN BLOOD BY WESTERGREN METHOD: 77 MM/HR (ref 0–20)
EST. GFR  (NO RACE VARIABLE): >60 ML/MIN/1.73 M^2
GLUCOSE SERPL-MCNC: 95 MG/DL (ref 70–110)
GLUCOSE UR QL STRIP: NEGATIVE
HCT VFR BLD AUTO: 30.9 % (ref 37–48.5)
HGB BLD-MCNC: 10.3 G/DL (ref 12–16)
HGB UR QL STRIP: NEGATIVE
IMM GRANULOCYTES # BLD AUTO: 0.08 K/UL (ref 0–0.04)
IMM GRANULOCYTES NFR BLD AUTO: 0.5 % (ref 0–0.5)
INR PPP: 1 (ref 0.8–1.2)
KETONES UR QL STRIP: NEGATIVE
LACTATE SERPL-SCNC: 1.1 MMOL/L (ref 0.5–2.2)
LACTATE SERPL-SCNC: 3.6 MMOL/L (ref 0.5–2.2)
LDH SERPL L TO P-CCNC: 3.59 MMOL/L (ref 0.5–2.2)
LEUKOCYTE ESTERASE UR QL STRIP: NEGATIVE
LYMPHOCYTES # BLD AUTO: 6.5 K/UL (ref 1–4.8)
LYMPHOCYTES NFR BLD: 36.8 % (ref 18–48)
MAGNESIUM SERPL-MCNC: 1.1 MG/DL (ref 1.6–2.6)
MCH RBC QN AUTO: 25.6 PG (ref 27–31)
MCHC RBC AUTO-ENTMCNC: 33.3 G/DL (ref 32–36)
MCV RBC AUTO: 77 FL (ref 82–98)
MONOCYTES # BLD AUTO: 2 K/UL (ref 0.3–1)
MONOCYTES NFR BLD: 11.4 % (ref 4–15)
NEUTROPHILS # BLD AUTO: 8.6 K/UL (ref 1.8–7.7)
NEUTROPHILS NFR BLD: 49.1 % (ref 38–73)
NITRITE UR QL STRIP: NEGATIVE
NRBC BLD-RTO: 0 /100 WBC
PH UR STRIP: 6 [PH] (ref 5–8)
PHOSPHATE SERPL-MCNC: 2.7 MG/DL (ref 2.7–4.5)
PLATELET # BLD AUTO: 665 K/UL (ref 150–450)
PMV BLD AUTO: 9.3 FL (ref 9.2–12.9)
POC MOLECULAR INFLUENZA A AGN: NEGATIVE
POC MOLECULAR INFLUENZA B AGN: NEGATIVE
POTASSIUM SERPL-SCNC: 4.2 MMOL/L (ref 3.5–5.1)
PREALB SERPL-MCNC: 12 MG/DL (ref 20–43)
PROCALCITONIN SERPL IA-MCNC: 0.02 NG/ML
PROT SERPL-MCNC: 7 G/DL (ref 6–8.4)
PROT UR QL STRIP: NEGATIVE
PROTHROMBIN TIME: 11 SEC (ref 9–12.5)
PTH-INTACT SERPL-MCNC: 53.8 PG/ML (ref 9–77)
RBC # BLD AUTO: 4.03 M/UL (ref 4–5.4)
SAMPLE: ABNORMAL
SARS-COV-2 RDRP RESP QL NAA+PROBE: NEGATIVE
SITE: ABNORMAL
SODIUM SERPL-SCNC: 137 MMOL/L (ref 136–145)
SP GR UR STRIP: 1.01 (ref 1–1.03)
TSH SERPL DL<=0.005 MIU/L-ACNC: 1.88 UIU/ML (ref 0.4–4)
URN SPEC COLLECT METH UR: NORMAL
UROBILINOGEN UR STRIP-ACNC: NEGATIVE EU/DL
VALPROATE SERPL-MCNC: 28.8 UG/ML (ref 50–100)
WBC # BLD AUTO: 17.51 K/UL (ref 3.9–12.7)

## 2022-12-22 PROCEDURE — 81003 URINALYSIS AUTO W/O SCOPE: CPT | Performed by: EMERGENCY MEDICINE

## 2022-12-22 PROCEDURE — 80053 COMPREHEN METABOLIC PANEL: CPT | Performed by: EMERGENCY MEDICINE

## 2022-12-22 PROCEDURE — 85300 ANTITHROMBIN III ACTIVITY: CPT | Performed by: EMERGENCY MEDICINE

## 2022-12-22 PROCEDURE — 63600175 PHARM REV CODE 636 W HCPCS: Performed by: EMERGENCY MEDICINE

## 2022-12-22 PROCEDURE — 82955 ASSAY OF G6PD ENZYME: CPT | Performed by: EMERGENCY MEDICINE

## 2022-12-22 PROCEDURE — 99214 OFFICE O/P EST MOD 30 MIN: CPT | Mod: ,,, | Performed by: FAMILY MEDICINE

## 2022-12-22 PROCEDURE — 83735 ASSAY OF MAGNESIUM: CPT | Performed by: EMERGENCY MEDICINE

## 2022-12-22 PROCEDURE — G0378 HOSPITAL OBSERVATION PER HR: HCPCS

## 2022-12-22 PROCEDURE — 83605 ASSAY OF LACTIC ACID: CPT | Performed by: EMERGENCY MEDICINE

## 2022-12-22 PROCEDURE — 83605 ASSAY OF LACTIC ACID: CPT

## 2022-12-22 PROCEDURE — 93010 EKG 12-LEAD: ICD-10-PCS | Mod: ,,, | Performed by: INTERNAL MEDICINE

## 2022-12-22 PROCEDURE — 85305 CLOT INHIBIT PROT S TOTAL: CPT | Performed by: EMERGENCY MEDICINE

## 2022-12-22 PROCEDURE — 83970 ASSAY OF PARATHORMONE: CPT | Performed by: EMERGENCY MEDICINE

## 2022-12-22 PROCEDURE — 82746 ASSAY OF FOLIC ACID SERUM: CPT | Performed by: EMERGENCY MEDICINE

## 2022-12-22 PROCEDURE — 87502 INFLUENZA DNA AMP PROBE: CPT

## 2022-12-22 PROCEDURE — 80164 ASSAY DIPROPYLACETIC ACD TOT: CPT | Performed by: EMERGENCY MEDICINE

## 2022-12-22 PROCEDURE — 99214 PR OFFICE/OUTPT VISIT, EST, LEVL IV, 30-39 MIN: ICD-10-PCS | Mod: ,,, | Performed by: FAMILY MEDICINE

## 2022-12-22 PROCEDURE — 84100 ASSAY OF PHOSPHORUS: CPT | Performed by: EMERGENCY MEDICINE

## 2022-12-22 PROCEDURE — 85610 PROTHROMBIN TIME: CPT | Performed by: EMERGENCY MEDICINE

## 2022-12-22 PROCEDURE — 25000003 PHARM REV CODE 250: Performed by: EMERGENCY MEDICINE

## 2022-12-22 PROCEDURE — 85025 COMPLETE CBC W/AUTO DIFF WBC: CPT | Performed by: EMERGENCY MEDICINE

## 2022-12-22 PROCEDURE — 87040 BLOOD CULTURE FOR BACTERIA: CPT | Mod: 59 | Performed by: EMERGENCY MEDICINE

## 2022-12-22 PROCEDURE — S0073 INJECTION, AZTREONAM, 500 MG: HCPCS | Performed by: EMERGENCY MEDICINE

## 2022-12-22 PROCEDURE — 96361 HYDRATE IV INFUSION ADD-ON: CPT

## 2022-12-22 PROCEDURE — 99285 EMERGENCY DEPT VISIT HI MDM: CPT | Mod: 25,27

## 2022-12-22 PROCEDURE — 93010 ELECTROCARDIOGRAM REPORT: CPT | Mod: ,,, | Performed by: INTERNAL MEDICINE

## 2022-12-22 PROCEDURE — 84145 PROCALCITONIN (PCT): CPT | Performed by: EMERGENCY MEDICINE

## 2022-12-22 PROCEDURE — 96375 TX/PRO/DX INJ NEW DRUG ADDON: CPT

## 2022-12-22 PROCEDURE — 83036 HEMOGLOBIN GLYCOSYLATED A1C: CPT | Performed by: EMERGENCY MEDICINE

## 2022-12-22 PROCEDURE — 86147 CARDIOLIPIN ANTIBODY EA IG: CPT | Performed by: EMERGENCY MEDICINE

## 2022-12-22 PROCEDURE — 85303 CLOT INHIBIT PROT C ACTIVITY: CPT | Performed by: EMERGENCY MEDICINE

## 2022-12-22 PROCEDURE — 93005 ELECTROCARDIOGRAM TRACING: CPT

## 2022-12-22 PROCEDURE — 86140 C-REACTIVE PROTEIN: CPT | Performed by: EMERGENCY MEDICINE

## 2022-12-22 PROCEDURE — 96365 THER/PROPH/DIAG IV INF INIT: CPT

## 2022-12-22 PROCEDURE — 11000001 HC ACUTE MED/SURG PRIVATE ROOM

## 2022-12-22 PROCEDURE — 83880 ASSAY OF NATRIURETIC PEPTIDE: CPT | Performed by: EMERGENCY MEDICINE

## 2022-12-22 PROCEDURE — 87635 SARS-COV-2 COVID-19 AMP PRB: CPT | Performed by: EMERGENCY MEDICINE

## 2022-12-22 PROCEDURE — 99213 OFFICE O/P EST LOW 20 MIN: CPT | Mod: 25 | Performed by: FAMILY MEDICINE

## 2022-12-22 PROCEDURE — 84134 ASSAY OF PREALBUMIN: CPT | Performed by: EMERGENCY MEDICINE

## 2022-12-22 PROCEDURE — 96367 TX/PROPH/DG ADDL SEQ IV INF: CPT

## 2022-12-22 PROCEDURE — 85652 RBC SED RATE AUTOMATED: CPT | Performed by: EMERGENCY MEDICINE

## 2022-12-22 PROCEDURE — 99900035 HC TECH TIME PER 15 MIN (STAT)

## 2022-12-22 PROCEDURE — 84443 ASSAY THYROID STIM HORMONE: CPT | Performed by: EMERGENCY MEDICINE

## 2022-12-22 RX ORDER — ONDANSETRON 2 MG/ML
4 INJECTION INTRAMUSCULAR; INTRAVENOUS EVERY 6 HOURS PRN
Status: DISCONTINUED | OUTPATIENT
Start: 2022-12-22 | End: 2022-12-27 | Stop reason: HOSPADM

## 2022-12-22 RX ORDER — CIPROFLOXACIN 2 MG/ML
400 INJECTION, SOLUTION INTRAVENOUS
Status: DISCONTINUED | OUTPATIENT
Start: 2022-12-23 | End: 2022-12-23

## 2022-12-22 RX ORDER — MAG HYDROX/ALUMINUM HYD/SIMETH 200-200-20
30 SUSPENSION, ORAL (FINAL DOSE FORM) ORAL EVERY 6 HOURS PRN
Status: DISCONTINUED | OUTPATIENT
Start: 2022-12-22 | End: 2022-12-27 | Stop reason: HOSPADM

## 2022-12-22 RX ORDER — IBUPROFEN 200 MG
16 TABLET ORAL
Status: DISCONTINUED | OUTPATIENT
Start: 2022-12-22 | End: 2022-12-27 | Stop reason: HOSPADM

## 2022-12-22 RX ORDER — MAGNESIUM SULFATE 1 G/100ML
1 INJECTION INTRAVENOUS
Status: COMPLETED | OUTPATIENT
Start: 2022-12-22 | End: 2022-12-22

## 2022-12-22 RX ORDER — TALC
6 POWDER (GRAM) TOPICAL NIGHTLY PRN
Status: DISCONTINUED | OUTPATIENT
Start: 2022-12-22 | End: 2022-12-27 | Stop reason: HOSPADM

## 2022-12-22 RX ORDER — GLUCAGON 1 MG
1 KIT INJECTION
Status: DISCONTINUED | OUTPATIENT
Start: 2022-12-22 | End: 2022-12-27 | Stop reason: HOSPADM

## 2022-12-22 RX ORDER — ACETAMINOPHEN 325 MG/1
650 TABLET ORAL
Status: COMPLETED | OUTPATIENT
Start: 2022-12-22 | End: 2022-12-22

## 2022-12-22 RX ORDER — INSULIN ASPART 100 [IU]/ML
0-5 INJECTION, SOLUTION INTRAVENOUS; SUBCUTANEOUS
Status: DISCONTINUED | OUTPATIENT
Start: 2022-12-22 | End: 2022-12-27 | Stop reason: HOSPADM

## 2022-12-22 RX ORDER — NALOXONE HCL 0.4 MG/ML
0.02 VIAL (ML) INJECTION
Status: DISCONTINUED | OUTPATIENT
Start: 2022-12-22 | End: 2022-12-27 | Stop reason: HOSPADM

## 2022-12-22 RX ORDER — ACETAMINOPHEN 325 MG/1
650 TABLET ORAL EVERY 6 HOURS PRN
Status: DISCONTINUED | OUTPATIENT
Start: 2022-12-22 | End: 2022-12-27 | Stop reason: HOSPADM

## 2022-12-22 RX ORDER — CIPROFLOXACIN 2 MG/ML
400 INJECTION, SOLUTION INTRAVENOUS
Status: COMPLETED | OUTPATIENT
Start: 2022-12-22 | End: 2022-12-22

## 2022-12-22 RX ORDER — IBUPROFEN 200 MG
24 TABLET ORAL
Status: DISCONTINUED | OUTPATIENT
Start: 2022-12-22 | End: 2022-12-27 | Stop reason: HOSPADM

## 2022-12-22 RX ORDER — KETOROLAC TROMETHAMINE 30 MG/ML
15 INJECTION, SOLUTION INTRAMUSCULAR; INTRAVENOUS
Status: COMPLETED | OUTPATIENT
Start: 2022-12-22 | End: 2022-12-22

## 2022-12-22 RX ORDER — SODIUM CHLORIDE 0.9 % (FLUSH) 0.9 %
10 SYRINGE (ML) INJECTION EVERY 12 HOURS PRN
Status: DISCONTINUED | OUTPATIENT
Start: 2022-12-22 | End: 2022-12-27 | Stop reason: HOSPADM

## 2022-12-22 RX ADMIN — MAGNESIUM SULFATE 1 G: 1 INJECTION INTRAVENOUS at 10:12

## 2022-12-22 RX ADMIN — ACETAMINOPHEN 650 MG: 325 TABLET ORAL at 07:12

## 2022-12-22 RX ADMIN — AZTREONAM 1000 MG: 1 INJECTION, POWDER, LYOPHILIZED, FOR SOLUTION INTRAMUSCULAR; INTRAVENOUS at 07:12

## 2022-12-22 RX ADMIN — CIPROFLOXACIN 400 MG: 2 INJECTION, SOLUTION INTRAVENOUS at 06:12

## 2022-12-22 RX ADMIN — SODIUM CHLORIDE 3198 ML: 9 INJECTION, SOLUTION INTRAVENOUS at 06:12

## 2022-12-22 RX ADMIN — KETOROLAC TROMETHAMINE 15 MG: 30 INJECTION, SOLUTION INTRAMUSCULAR; INTRAVENOUS at 10:12

## 2022-12-22 RX ADMIN — VANCOMYCIN HYDROCHLORIDE 2000 MG: 500 INJECTION, POWDER, LYOPHILIZED, FOR SOLUTION INTRAVENOUS at 08:12

## 2022-12-22 NOTE — PROGRESS NOTES
"[facility]                Progress Note    Subjective:       Patient ID: Audrey Natarajan is a 52 y.o. female.    Chief Complaint: Wound Care    Pt for follow up. On knee scooter. C/O of pain 10 out of 10 to left foot. Left foot swollen, red, and warm to touch, ' since last Friday." Pt was seen by Dr. Zambrano. Brought to ER by nurse in wheelchair. Pt to follow up PRN.    Review of Systems   Constitutional:  Positive for activity change and fatigue.   HENT: Negative.     Eyes: Negative.    Respiratory: Negative.     Cardiovascular: Negative.    Gastrointestinal: Negative.    Endocrine: Negative.    Musculoskeletal: Negative.    Skin:  Positive for wound.   All other systems reviewed and are negative.      Objective:        Physical Exam  Vitals reviewed.   Constitutional:       Appearance: She is well-developed.   HENT:      Head: Normocephalic and atraumatic.   Eyes:      Extraocular Movements: Extraocular movements intact.      Conjunctiva/sclera: Conjunctivae normal.      Pupils: Pupils are equal, round, and reactive to light.   Skin:     General: Skin is warm and dry.      Comments: See wound description for further information   Neurological:      Mental Status: She is alert and oriented to person, place, and time.   Psychiatric:         Mood and Affect: Mood normal.         Behavior: Behavior normal.       Vitals:    12/22/22 1619   BP: (!) 174/76   Pulse: (!) 112   Temp: 98.2 °F (36.8 °C)       Assessment:           ICD-10-CM ICD-9-CM   1. Left midfoot ulcer, with necrosis of muscle  L97.423 707.14     728.89            Wound 04/15/22 2230 Diabetic Ulcer Left medial;plantar Foot (Active)   04/15/22 2230    Pre-existing: Yes   Primary Wound Type: Diabetic ulc   Side: Left   Orientation: medial;plantar   Location: Foot   Wound Number:    Ankle-Brachial Index:    Pulses:    Removal Indication and Assessment:    Wound Outcome:    (Retired) Wound Type:    (Retired) " Wound Length (cm):    (Retired) Wound Width (cm):    (Retired) Depth (cm):    Wound Description (Comments):    Removal Indications:    Wound Image   12/22/22 1624   Wound WDL ex 12/22/22 1624   Dressing Appearance Intact 12/22/22 1624   Drainage Amount Moderate 12/22/22 1624   Drainage Characteristics/Odor Serosanguineous 12/22/22 1624   Appearance Red;Maroon;Pink;Yellow 12/22/22 1624   Tissue loss description Full thickness 12/22/22 1624   Black (%), Wound Tissue Color 0 % 12/22/22 1624   Red (%), Wound Tissue Color 95 % 12/22/22 1624   Yellow (%), Wound Tissue Color 5 % 12/22/22 1624   Periwound Area Dry 12/22/22 1624   Wound Edges Callused 12/22/22 1624   Wound Length (cm) 4 cm 12/22/22 1624   Wound Width (cm) 4.8 cm 12/22/22 1624   Wound Depth (cm) 1.5 cm 12/22/22 1624   Wound Volume (cm^3) 28.8 cm^3 12/22/22 1624   Wound Surface Area (cm^2) 19.2 cm^2 12/22/22 1624   Tunneling (depth (cm)/location) 0 12/22/22 1624   Undermining (depth (cm)/location) 0 12/22/22 1624   Care Cleansed with:;Antimicrobial agent;Sterile normal saline 12/22/22 1624   Dressing Applied;Cast padding;Elastic bandage 12/22/22 1624           Plan:          Debridement needed and Not Done today. To er.  See previous documentation note   Continue off-loading / leg elevation   Continue eating protein   Keep dressing clean and intact  Continue with wound care orders and plan as noted in orders.   Continue to follow current medication regimen as per pcp   Call for any questions / concerns.       Tissue pathology and/or culture taken     [] Yes      [x] No  Sharp debridement performed                   [] Yes       [x] No  Labs ordered     [] Yes       [x] No  Imaging ordered    [] Yes      [x] No    No orders of the defined types were placed in this encounter.       Follow up if symptoms worsen or fail to improve, for wound care.

## 2022-12-22 NOTE — ED PROVIDER NOTES
Encounter Date: 12/22/2022       History     Chief Complaint   Patient presents with    Wound Check     The patient reports that she has a wound to her left foot x 7 months and it has been feeling hot and painful since last week. Patient reports that her wound is draining purulent drainage. Patient is unsure if she has had a fever. She states that the wound care physician sent her to the ED for antibiotics.     52 y.o. female Past Medical History:  No date: ADHD (attention deficit hyperactivity disorder)  No date: Arthritis  No date: Asthma  No date: Bipolar 1 disorder  No date: Cataract  No date: Cigarette smoker  No date: COPD (chronic obstructive pulmonary disease)  No date: Coronary artery disease      Comment:  A fib  No date: Depression      Comment:  bipolar manic depresson  No date: Diabetes mellitus  No date: Diabetic foot ulcers  No date: Diabetic neuropathy  07/2013: DVT of lower extremity, bilateral      Comment:  bilateral LE DVT. Nelly filter placed.   No date: Encounter for blood transfusion  1. Left Leg=2003; 2.Bilateral Groin=Blood Clots= 5 or 6/ 2013 & 7/ 2013; 3. LLL of Lung=7/2013;  4. Lt. Lower Leg=7/2013. : History of   blood clots      Comment:  Pt. had 1st Blood Clot after Iavayfzivwlt=9919, &                Last=2013. Nelly Filter= Rt.Lateral Neck.  06/06/2013: HTN (hypertension)      Comment:  Pt states that she does not have hypertension  No date: Hypercholesteremia  No date: Irregular heartbeat  No date: Neuromuscular disorder      Comment:  neuropathy feet  No date: Obese  07/2013: PE (pulmonary embolism)      Comment:  bilat LE DVT.   No date: Restless leg syndrome    Patient presents to the emergency department for evaluation of chronic wound to her left foot. She reports a history of several DVTs in b/l legs hx PE and afib on eliquis and has a nelly filter.  Per wound care note pt is currently on oral abx. Does not feels well, was concerned regarding another potential  "clot and wound infection.     Review of patient's allergies indicates:   Allergen Reactions    Morphine Other (See Comments)     Patient had a psychotic episode after taking Morphine  Agitation, hallucinations    Penicillins Anaphylaxis     itching    Januvia [sitagliptin] Hives    Carbamazepine Other (See Comments)     hyponatremia     Past Medical History:   Diagnosis Date    ADHD (attention deficit hyperactivity disorder)     Arthritis     Asthma     Bipolar 1 disorder     Cataract     Cigarette smoker     COPD (chronic obstructive pulmonary disease)     Coronary artery disease     A fib    Depression     bipolar manic depresson    Diabetes mellitus     Diabetic foot ulcers     Diabetic neuropathy     DVT of lower extremity, bilateral 07/2013    bilateral LE DVT. Estelita filter placed.     Encounter for blood transfusion     History of blood clots 1. Left Leg=2003; 2.Bilateral Groin=Blood Clots= 5 or 6/ 2013 & 7/2013; 3. LLL of Lung=7/2013;  4. Lt. Lower Leg=7/2013.     Pt. had 1st Blood Clot after Smfwgookrvfi=4175, & Last=2013. [place] Filter= Rt.Lateral Neck.    HTN (hypertension) 06/06/2013    Pt states that she does not have hypertension    Hypercholesteremia     Irregular heartbeat     Neuromuscular disorder     neuropathy feet    Obese     PE (pulmonary embolism) 07/2013    bilat LE DVT.     Restless leg syndrome      Past Surgical History:   Procedure Laterality Date    ABDOMINAL SURGERY  2010    gastric sleeve    BILATERAL OOPHORECTOMY Bilateral 1/12/2015    CHOLECYSTECTOMY      DEBRIDEMENT OF FOOT Bilateral 5/10/2022    Procedure: DEBRIDEMENT, FOOT;  Surgeon: Maira De Los Santos DPM;  [facility];  Service: Podiatry;  Laterality: Bilateral;    Green' s filter Right 7/4/2012    Right Neck & Tunneled Down.    HERNIA REPAIR      "[place] [facility] [place] [facility] around th [place].", pt. states    LAPAROSCOPIC CHOLECYSTECTOMY N/A 9/10/2020    Procedure: CHOLECYSTECTOMY, LAPAROSCOPIC;  Surgeon: Montrell LAMB" MD Matt;  Location: [facility] OR;  Service: General;  Laterality: N/A;  RN PREOP ----COVID Negative      OVARIAN CYST REMOVAL  3/13/2014    [place] REMOVAL OF OVARY/TUBE(S)      SPLENECTOMY, TOTAL  2003    TONSILLECTOMY      as a child    TYMPANOSTOMY TUBE PLACEMENT  1976    VEIN SURGERY      Lt leg     Family History   Problem Relation Age of Onset    Hypertension Father     Diabetes Father     Heart disease Father     Cataracts Father     Diabetes Paternal Grandfather     Heart disease Paternal Grandfather     No Known Problems Mother     Ovarian cancer Maternal Grandmother          from this. ? age     No Known Problems Sister     No Known Problems Brother     No Known Problems Maternal Aunt     No Known Problems Maternal Uncle     No Known Problems Paternal Aunt     No Known Problems Paternal Uncle     No Known Problems Maternal Grandfather     Ovarian cancer Paternal Grandmother     Uterine cancer Neg Hx     Breast cancer Neg Hx     Colon cancer Neg Hx     Amblyopia Neg Hx     Blindness Neg Hx     Cancer Neg Hx     Glaucoma Neg Hx     Macular degeneration Neg Hx     Retinal detachment Neg Hx     Strabismus Neg Hx     Stroke Neg Hx     Thyroid disease Neg Hx      Social History     Tobacco Use    Smoking status: Every Day     Packs/day: 1.00     Years: 37.00     Pack years: 37.00     Types: Cigarettes     Last attempt to quit: 2020     Years since quittin.0    Smokeless tobacco: Never    Tobacco comments:     Enrolled in the [employer] on 5/3/14 ([facility] Member ID # [id number]). Ambulatory referral to Smoking Cessation Program   Substance Use Topics    Alcohol use: No     Alcohol/week: 0.0 standard drinks    Drug use: No     Review of Systems   Constitutional:  Negative for fever.   HENT:  Negative for sore throat.    Respiratory:  Negative for shortness of breath.    Cardiovascular:  Negative for chest pain.   Gastrointestinal:  Negative for nausea.   Genitourinary:  Negative for dysuria.    Musculoskeletal:  Negative for back pain.   Skin:  Negative for rash.   Neurological:  Negative for weakness.   Hematological:  Does not bruise/bleed easily.   All other systems reviewed and are negative.    Physical Exam     Initial Vitals [12/22/22 1652]   BP Pulse Resp Temp SpO2   (!) 114/58 104 18 99.8 °F (37.7 °C) 98 %      MAP       --         Physical Exam    Nursing note and vitals reviewed.  Constitutional: She appears well-developed and well-nourished.   HENT:   Head: Normocephalic and atraumatic.   Eyes: Conjunctivae and EOM are normal. Pupils are equal, round, and reactive to light.   Neck:   Normal range of motion.  Cardiovascular:  Normal rate.           Pulmonary/Chest: No respiratory distress.   Abdominal: She exhibits no distension.   Musculoskeletal:         General: Normal range of motion.      Cervical back: Normal range of motion.     Neurological: She is alert. No cranial nerve deficit. GCS score is 15. GCS eye subscore is 4. GCS verbal subscore is 5. GCS motor subscore is 6.   Skin: Skin is warm and dry.   Psychiatric: She has a normal mood and affect. Thought content normal.     L foot plantar surface wound: 4cm x 4.8cm x  1.5 deep  100% granular with areas of hypergranulosity and calloused edge.   No visible undermining no deep structure involvement  She has erythema extending to just proximal to the knee consistent with cellulitis  There is no foul smell there is no visible drainage  Left lower extremity swelling dramatically bigger than right lower extremity and patient is right handed  ED Course   Procedures  Labs Reviewed   CULTURE, BLOOD   CULTURE, BLOOD   CBC W/ AUTO DIFFERENTIAL   COMPREHENSIVE METABOLIC PANEL   LACTIC ACID, PLASMA   URINALYSIS, REFLEX TO URINE CULTURE   MAGNESIUM   PHOSPHORUS   PROTIME-INR   B-TYPE NATRIURETIC PEPTIDE   PROCALCITONIN   TSH   SEDIMENTATION RATE   C-REACTIVE PROTEIN   PREALBUMIN   PTH, INTACT   G6PD,QUANTITATIVE   PROTEIN S FUNCTIONAL ACTIVITY  "  PROTEIN C FUNCTIONAL ACTIVITY   ANTITHROMBIN III   ANTIPHOSPHOLIPID AB (ANTICARDIOLIPIN)   FOLATE   POCT INFLUENZA A/B MOLECULAR   SARS-COV-2 RDRP GENE          Imaging Results    None          Medications   sodium chloride 0.9% bolus 3,198 mL 3,198 mL (has no administration in time range)                       Will have nurse place Aquacel  with a Mepilex border on the wound  Have ordered sepsis labs,  ESR CRP, and labs relevant to wound care.       Called back to room, patient screaming and cursing at RN staff calling RN "qasim" and states that she "is getting the hell out of here and going across the river".     I have attempted to calm the patient however she is belligerent,  being screaming/cursing at myself and multiple staff. Attempts to redirect patient unsuccessful. I have stepped oout of the room to allow the patient time to calm down.    Pt now calm. Very impatient and quick to self cycle into a rage. She is exhibiting "splitting" and manipulative behavior which is characteristic of her underlying psych disorder. I have placed limits that if she has another outburst or is verbally abusive towards staff again security will be called.     The patient has taken her own medication shortly after being placed in the ED bed, including her Tramadol.    Pt c/o persistent pain, have ordered IV toradol.     Esr/crp and wbc slight elevation, may benefit from non emergent MRI to exclude osteo    Have consulted ortho.  Clinical Impression:   Final diagnoses:  [R00.0] Tachycardia  [L03.90] Wound cellulitis  [L03.90] Wound cellulitis - chronic wound eval for osteo               Betty Correa MD  12/22/22 9947    "

## 2022-12-22 NOTE — PROGRESS NOTES
Female patient with history of multiple conditions presents to wound clinic for wound follow up.  She reports increased pain and swelling in her leg.  She indicates a history of several DVTs with in both legs.  History of pulmonary emoblism and afib.  She is anticoagulated and has a nelly filter.  Noted diabetic ulcer with increased pain, erythema and edema.  Patient tachycardic on presentation.  She is currently on oral antibiotics.  She indicates that she does not feel well and has not eaten anything today.  She is concern about another blood clot.  We discussed she may have another blood clot as well as a skin infection.  She was urgently sent to ER for further evaluation and intervention.

## 2022-12-23 ENCOUNTER — TELEPHONE (OUTPATIENT)
Dept: WOUND CARE | Facility: HOSPITAL | Age: 52
End: 2022-12-23
Payer: MEDICAID

## 2022-12-23 LAB
ALBUMIN SERPL BCP-MCNC: 3 G/DL (ref 3.5–5.2)
ALP SERPL-CCNC: 84 U/L (ref 55–135)
ALT SERPL W/O P-5'-P-CCNC: 7 U/L (ref 10–44)
ANION GAP SERPL CALC-SCNC: 9 MMOL/L (ref 8–16)
AST SERPL-CCNC: 8 U/L (ref 10–40)
BASOPHILS # BLD AUTO: 0.08 K/UL (ref 0–0.2)
BASOPHILS NFR BLD: 0.7 % (ref 0–1.9)
BILIRUB SERPL-MCNC: 0.3 MG/DL (ref 0.1–1)
BUN SERPL-MCNC: 10 MG/DL (ref 6–20)
CALCIUM SERPL-MCNC: 8.6 MG/DL (ref 8.7–10.5)
CHLORIDE SERPL-SCNC: 101 MMOL/L (ref 95–110)
CO2 SERPL-SCNC: 25 MMOL/L (ref 23–29)
CREAT SERPL-MCNC: 0.7 MG/DL (ref 0.5–1.4)
DIFFERENTIAL METHOD: ABNORMAL
EOSINOPHIL # BLD AUTO: 0.3 K/UL (ref 0–0.5)
EOSINOPHIL NFR BLD: 3 % (ref 0–8)
ERYTHROCYTE [DISTWIDTH] IN BLOOD BY AUTOMATED COUNT: 16.9 % (ref 11.5–14.5)
EST. GFR  (NO RACE VARIABLE): >60 ML/MIN/1.73 M^2
ESTIMATED AVG GLUCOSE: 157 MG/DL (ref 68–131)
FOLATE SERPL-MCNC: 15.3 NG/ML (ref 4–24)
GLUCOSE SERPL-MCNC: 145 MG/DL (ref 70–110)
GRAM STN SPEC: NORMAL
GRAM STN SPEC: NORMAL
HBA1C MFR BLD: 7.1 % (ref 4–5.6)
HCT VFR BLD AUTO: 30.2 % (ref 37–48.5)
HGB BLD-MCNC: 9.6 G/DL (ref 12–16)
IMM GRANULOCYTES # BLD AUTO: 0.07 K/UL (ref 0–0.04)
IMM GRANULOCYTES NFR BLD AUTO: 0.6 % (ref 0–0.5)
LYMPHOCYTES # BLD AUTO: 3.1 K/UL (ref 1–4.8)
LYMPHOCYTES NFR BLD: 28.2 % (ref 18–48)
MAGNESIUM SERPL-MCNC: 1.4 MG/DL (ref 1.6–2.6)
MCH RBC QN AUTO: 24.7 PG (ref 27–31)
MCHC RBC AUTO-ENTMCNC: 31.8 G/DL (ref 32–36)
MCV RBC AUTO: 78 FL (ref 82–98)
MONOCYTES # BLD AUTO: 1.5 K/UL (ref 0.3–1)
MONOCYTES NFR BLD: 13.7 % (ref 4–15)
NEUTROPHILS # BLD AUTO: 5.9 K/UL (ref 1.8–7.7)
NEUTROPHILS NFR BLD: 53.8 % (ref 38–73)
NRBC BLD-RTO: 0 /100 WBC
PHOSPHATE SERPL-MCNC: 3 MG/DL (ref 2.7–4.5)
PLATELET # BLD AUTO: 635 K/UL (ref 150–450)
PMV BLD AUTO: 9.3 FL (ref 9.2–12.9)
POCT GLUCOSE: 158 MG/DL (ref 70–110)
POCT GLUCOSE: 179 MG/DL (ref 70–110)
POCT GLUCOSE: 191 MG/DL (ref 70–110)
POCT GLUCOSE: 192 MG/DL (ref 70–110)
POCT GLUCOSE: 212 MG/DL (ref 70–110)
POTASSIUM SERPL-SCNC: 4.1 MMOL/L (ref 3.5–5.1)
PROT SERPL-MCNC: 6.6 G/DL (ref 6–8.4)
RBC # BLD AUTO: 3.88 M/UL (ref 4–5.4)
SODIUM SERPL-SCNC: 135 MMOL/L (ref 136–145)
WBC # BLD AUTO: 10.94 K/UL (ref 3.9–12.7)

## 2022-12-23 PROCEDURE — 94640 AIRWAY INHALATION TREATMENT: CPT

## 2022-12-23 PROCEDURE — 99223 1ST HOSP IP/OBS HIGH 75: CPT | Mod: ,,, | Performed by: PODIATRIST

## 2022-12-23 PROCEDURE — 83735 ASSAY OF MAGNESIUM: CPT | Performed by: NURSE PRACTITIONER

## 2022-12-23 PROCEDURE — 63600175 PHARM REV CODE 636 W HCPCS: Performed by: NURSE PRACTITIONER

## 2022-12-23 PROCEDURE — 85025 COMPLETE CBC W/AUTO DIFF WBC: CPT | Performed by: NURSE PRACTITIONER

## 2022-12-23 PROCEDURE — 25000003 PHARM REV CODE 250: Performed by: NURSE PRACTITIONER

## 2022-12-23 PROCEDURE — 87186 SC STD MICRODIL/AGAR DIL: CPT | Performed by: PODIATRIST

## 2022-12-23 PROCEDURE — 87075 CULTR BACTERIA EXCEPT BLOOD: CPT | Performed by: PODIATRIST

## 2022-12-23 PROCEDURE — G0378 HOSPITAL OBSERVATION PER HR: HCPCS

## 2022-12-23 PROCEDURE — 25000242 PHARM REV CODE 250 ALT 637 W/ HCPCS: Performed by: NURSE PRACTITIONER

## 2022-12-23 PROCEDURE — 25000003 PHARM REV CODE 250: Performed by: STUDENT IN AN ORGANIZED HEALTH CARE EDUCATION/TRAINING PROGRAM

## 2022-12-23 PROCEDURE — 36415 COLL VENOUS BLD VENIPUNCTURE: CPT | Performed by: NURSE PRACTITIONER

## 2022-12-23 PROCEDURE — S0073 INJECTION, AZTREONAM, 500 MG: HCPCS | Performed by: NURSE PRACTITIONER

## 2022-12-23 PROCEDURE — 63600175 PHARM REV CODE 636 W HCPCS: Performed by: EMERGENCY MEDICINE

## 2022-12-23 PROCEDURE — 11000001 HC ACUTE MED/SURG PRIVATE ROOM

## 2022-12-23 PROCEDURE — 84100 ASSAY OF PHOSPHORUS: CPT | Performed by: NURSE PRACTITIONER

## 2022-12-23 PROCEDURE — 87205 SMEAR GRAM STAIN: CPT | Performed by: PODIATRIST

## 2022-12-23 PROCEDURE — 99223 PR INITIAL HOSPITAL CARE,LEVL III: ICD-10-PCS | Mod: ,,, | Performed by: PODIATRIST

## 2022-12-23 PROCEDURE — 25000003 PHARM REV CODE 250

## 2022-12-23 PROCEDURE — 94760 N-INVAS EAR/PLS OXIMETRY 1: CPT

## 2022-12-23 PROCEDURE — 80053 COMPREHEN METABOLIC PANEL: CPT | Performed by: NURSE PRACTITIONER

## 2022-12-23 PROCEDURE — 87070 CULTURE OTHR SPECIMN AEROBIC: CPT | Performed by: PODIATRIST

## 2022-12-23 PROCEDURE — 87077 CULTURE AEROBIC IDENTIFY: CPT | Performed by: PODIATRIST

## 2022-12-23 PROCEDURE — 25500020 PHARM REV CODE 255: Performed by: STUDENT IN AN ORGANIZED HEALTH CARE EDUCATION/TRAINING PROGRAM

## 2022-12-23 PROCEDURE — S4991 NICOTINE PATCH NONLEGEND: HCPCS | Performed by: STUDENT IN AN ORGANIZED HEALTH CARE EDUCATION/TRAINING PROGRAM

## 2022-12-23 PROCEDURE — 25000003 PHARM REV CODE 250: Performed by: EMERGENCY MEDICINE

## 2022-12-23 PROCEDURE — A4216 STERILE WATER/SALINE, 10 ML: HCPCS | Performed by: NURSE PRACTITIONER

## 2022-12-23 PROCEDURE — 63600175 PHARM REV CODE 636 W HCPCS: Performed by: STUDENT IN AN ORGANIZED HEALTH CARE EDUCATION/TRAINING PROGRAM

## 2022-12-23 PROCEDURE — A9585 GADOBUTROL INJECTION: HCPCS | Performed by: STUDENT IN AN ORGANIZED HEALTH CARE EDUCATION/TRAINING PROGRAM

## 2022-12-23 RX ORDER — GABAPENTIN 300 MG/1
CAPSULE ORAL
Status: COMPLETED
Start: 2022-12-23 | End: 2022-12-24

## 2022-12-23 RX ORDER — DIVALPROEX SODIUM 250 MG/1
500 TABLET, DELAYED RELEASE ORAL DAILY
Status: DISCONTINUED | OUTPATIENT
Start: 2022-12-23 | End: 2022-12-27 | Stop reason: HOSPADM

## 2022-12-23 RX ORDER — GABAPENTIN 300 MG/1
600 CAPSULE ORAL 2 TIMES DAILY
Status: DISCONTINUED | OUTPATIENT
Start: 2022-12-23 | End: 2022-12-27 | Stop reason: HOSPADM

## 2022-12-23 RX ORDER — MELOXICAM 7.5 MG/1
15 TABLET ORAL DAILY
Status: DISCONTINUED | OUTPATIENT
Start: 2022-12-23 | End: 2022-12-23

## 2022-12-23 RX ORDER — PRAVASTATIN SODIUM 40 MG/1
40 TABLET ORAL NIGHTLY
Status: DISCONTINUED | OUTPATIENT
Start: 2022-12-23 | End: 2022-12-27 | Stop reason: HOSPADM

## 2022-12-23 RX ORDER — TRAMADOL HYDROCHLORIDE 50 MG/1
50 TABLET ORAL EVERY 12 HOURS PRN
Status: DISCONTINUED | OUTPATIENT
Start: 2022-12-23 | End: 2022-12-23

## 2022-12-23 RX ORDER — METOPROLOL TARTRATE 50 MG/1
TABLET ORAL
Status: COMPLETED
Start: 2022-12-23 | End: 2022-12-24

## 2022-12-23 RX ORDER — MAGNESIUM SULFATE HEPTAHYDRATE 40 MG/ML
2 INJECTION, SOLUTION INTRAVENOUS ONCE
Status: COMPLETED | OUTPATIENT
Start: 2022-12-23 | End: 2022-12-23

## 2022-12-23 RX ORDER — DIVALPROEX SODIUM 250 MG/1
TABLET, DELAYED RELEASE ORAL
Status: COMPLETED
Start: 2022-12-23 | End: 2022-12-24

## 2022-12-23 RX ORDER — IBUPROFEN 200 MG
1 TABLET ORAL DAILY
Status: DISCONTINUED | OUTPATIENT
Start: 2022-12-23 | End: 2022-12-27 | Stop reason: HOSPADM

## 2022-12-23 RX ORDER — DIVALPROEX SODIUM 250 MG/1
1250 TABLET, DELAYED RELEASE ORAL NIGHTLY
Status: DISCONTINUED | OUTPATIENT
Start: 2022-12-23 | End: 2022-12-27 | Stop reason: HOSPADM

## 2022-12-23 RX ORDER — TRAMADOL HYDROCHLORIDE 50 MG/1
50 TABLET ORAL EVERY 6 HOURS PRN
Status: DISCONTINUED | OUTPATIENT
Start: 2022-12-23 | End: 2022-12-27 | Stop reason: HOSPADM

## 2022-12-23 RX ORDER — RISPERIDONE 1 MG/1
TABLET ORAL
Status: COMPLETED
Start: 2022-12-23 | End: 2022-12-24

## 2022-12-23 RX ORDER — GADOBUTROL 604.72 MG/ML
10 INJECTION INTRAVENOUS
Status: COMPLETED | OUTPATIENT
Start: 2022-12-23 | End: 2022-12-23

## 2022-12-23 RX ORDER — RISPERIDONE 1 MG/1
3 TABLET ORAL 2 TIMES DAILY
Status: DISCONTINUED | OUTPATIENT
Start: 2022-12-23 | End: 2022-12-27 | Stop reason: HOSPADM

## 2022-12-23 RX ORDER — FLUTICASONE PROPIONATE 50 MCG
2 SPRAY, SUSPENSION (ML) NASAL DAILY PRN
Status: DISCONTINUED | OUTPATIENT
Start: 2022-12-23 | End: 2022-12-27 | Stop reason: HOSPADM

## 2022-12-23 RX ORDER — IBUPROFEN 200 MG
1 TABLET ORAL DAILY
Status: DISCONTINUED | OUTPATIENT
Start: 2022-12-24 | End: 2022-12-23

## 2022-12-23 RX ORDER — LIDOCAINE 50 MG/G
1 PATCH TOPICAL
Status: DISCONTINUED | OUTPATIENT
Start: 2022-12-23 | End: 2022-12-27 | Stop reason: HOSPADM

## 2022-12-23 RX ORDER — TRAMADOL HYDROCHLORIDE 50 MG/1
TABLET ORAL
Status: DISPENSED
Start: 2022-12-23 | End: 2022-12-23

## 2022-12-23 RX ORDER — IPRATROPIUM BROMIDE AND ALBUTEROL SULFATE 2.5; .5 MG/3ML; MG/3ML
3 SOLUTION RESPIRATORY (INHALATION) EVERY 6 HOURS PRN
Status: DISCONTINUED | OUTPATIENT
Start: 2022-12-23 | End: 2022-12-27 | Stop reason: HOSPADM

## 2022-12-23 RX ORDER — CARBAMAZEPINE 200 MG/1
400 TABLET ORAL 2 TIMES DAILY
Status: DISCONTINUED | OUTPATIENT
Start: 2022-12-23 | End: 2022-12-23

## 2022-12-23 RX ORDER — METOPROLOL TARTRATE 50 MG/1
50 TABLET ORAL 2 TIMES DAILY
Status: DISCONTINUED | OUTPATIENT
Start: 2022-12-23 | End: 2022-12-27 | Stop reason: HOSPADM

## 2022-12-23 RX ORDER — LISINOPRIL 5 MG/1
10 TABLET ORAL DAILY
Status: DISCONTINUED | OUTPATIENT
Start: 2022-12-23 | End: 2022-12-27 | Stop reason: HOSPADM

## 2022-12-23 RX ORDER — FLUTICASONE FUROATE AND VILANTEROL 100; 25 UG/1; UG/1
1 POWDER RESPIRATORY (INHALATION) DAILY
Status: DISCONTINUED | OUTPATIENT
Start: 2022-12-23 | End: 2022-12-27 | Stop reason: HOSPADM

## 2022-12-23 RX ORDER — HYDROXYZINE HYDROCHLORIDE 25 MG/1
50 TABLET, FILM COATED ORAL 4 TIMES DAILY PRN
Status: DISCONTINUED | OUTPATIENT
Start: 2022-12-23 | End: 2022-12-27 | Stop reason: HOSPADM

## 2022-12-23 RX ORDER — LISINOPRIL 5 MG/1
TABLET ORAL
Status: COMPLETED
Start: 2022-12-23 | End: 2022-12-24

## 2022-12-23 RX ORDER — CLINDAMYCIN PHOSPHATE 900 MG/50ML
900 INJECTION, SOLUTION INTRAVENOUS
Status: DISCONTINUED | OUTPATIENT
Start: 2022-12-23 | End: 2022-12-24

## 2022-12-23 RX ADMIN — ACETAMINOPHEN 650 MG: 325 TABLET ORAL at 06:12

## 2022-12-23 RX ADMIN — FLUTICASONE FUROATE AND VILANTEROL TRIFENATATE 1 PUFF: 100; 25 POWDER RESPIRATORY (INHALATION) at 08:12

## 2022-12-23 RX ADMIN — VANCOMYCIN HYDROCHLORIDE 1750 MG: 500 INJECTION, POWDER, LYOPHILIZED, FOR SOLUTION INTRAVENOUS at 10:12

## 2022-12-23 RX ADMIN — Medication: at 09:12

## 2022-12-23 RX ADMIN — GABAPENTIN: 300 CAPSULE ORAL at 09:12

## 2022-12-23 RX ADMIN — DIVALPROEX SODIUM 500 MG: 250 TABLET, DELAYED RELEASE ORAL at 09:12

## 2022-12-23 RX ADMIN — CLINDAMYCIN PHOSPHATE 900 MG: 900 INJECTION, SOLUTION INTRAVENOUS at 11:12

## 2022-12-23 RX ADMIN — CIPROFLOXACIN 400 MG: 2 INJECTION, SOLUTION INTRAVENOUS at 05:12

## 2022-12-23 RX ADMIN — METOPROLOL TARTRATE 50 MG: 50 TABLET, FILM COATED ORAL at 10:12

## 2022-12-23 RX ADMIN — CLINDAMYCIN PHOSPHATE 900 MG: 900 INJECTION, SOLUTION INTRAVENOUS at 09:12

## 2022-12-23 RX ADMIN — METOPROLOL TARTRATE 50 MG: 50 TABLET, FILM COATED ORAL at 08:12

## 2022-12-23 RX ADMIN — RISPERIDONE 3 MG: 1 TABLET ORAL at 09:12

## 2022-12-23 RX ADMIN — Medication 1 PATCH: at 06:12

## 2022-12-23 RX ADMIN — LIDOCAINE 1 PATCH: 50 PATCH TOPICAL at 06:12

## 2022-12-23 RX ADMIN — AZTREONAM 1000 MG: 1 INJECTION, POWDER, LYOPHILIZED, FOR SOLUTION INTRAMUSCULAR; INTRAVENOUS at 07:12

## 2022-12-23 RX ADMIN — AZTREONAM 1000 MG: 1 INJECTION, POWDER, LYOPHILIZED, FOR SOLUTION INTRAMUSCULAR; INTRAVENOUS at 08:12

## 2022-12-23 RX ADMIN — ACETAMINOPHEN 650 MG: 325 TABLET ORAL at 11:12

## 2022-12-23 RX ADMIN — TRAMADOL HYDROCHLORIDE 50 MG: 50 TABLET, COATED ORAL at 04:12

## 2022-12-23 RX ADMIN — THERA TABS 1 TABLET: TAB at 09:12

## 2022-12-23 RX ADMIN — GABAPENTIN 600 MG: 300 CAPSULE ORAL at 09:12

## 2022-12-23 RX ADMIN — Medication 10 ML: at 04:12

## 2022-12-23 RX ADMIN — TRAMADOL HYDROCHLORIDE 50 MG: 50 TABLET, COATED ORAL at 06:12

## 2022-12-23 RX ADMIN — ONDANSETRON 4 MG: 2 INJECTION INTRAMUSCULAR; INTRAVENOUS at 01:12

## 2022-12-23 RX ADMIN — LISINOPRIL: 5 TABLET ORAL at 09:12

## 2022-12-23 RX ADMIN — GABAPENTIN 600 MG: 300 CAPSULE ORAL at 08:12

## 2022-12-23 RX ADMIN — MAGNESIUM SULFATE HEPTAHYDRATE 2 G: 40 INJECTION, SOLUTION INTRAVENOUS at 03:12

## 2022-12-23 RX ADMIN — LISINOPRIL 10 MG: 5 TABLET ORAL at 09:12

## 2022-12-23 RX ADMIN — RISPERIDONE 3 MG: 1 TABLET ORAL at 08:12

## 2022-12-23 RX ADMIN — DIVALPROEX SODIUM 1250 MG: 250 TABLET, DELAYED RELEASE ORAL at 08:12

## 2022-12-23 RX ADMIN — METOPROLOL TARTRATE: 50 TABLET, FILM COATED ORAL at 09:12

## 2022-12-23 RX ADMIN — RISPERIDONE: 1 TABLET ORAL at 09:12

## 2022-12-23 RX ADMIN — PRAVASTATIN SODIUM 40 MG: 40 TABLET ORAL at 08:12

## 2022-12-23 RX ADMIN — DIVALPROEX SODIUM: 250 TABLET, DELAYED RELEASE ORAL at 09:12

## 2022-12-23 RX ADMIN — GADOBUTROL 10 ML: 604.72 INJECTION INTRAVENOUS at 02:12

## 2022-12-23 NOTE — ED NOTES
"Patient arrived in ED and a septic alert was called. I immediately reported to the room to assist the assigned nurse in drawing labs and getting vascular access. Myself, Mika RN, RT, and Dr. Correa were all in the patient room working her up. Dr. Correa was assessing the patient's wound while myself and mika were prepping for IV and several labs. Mika's first IV attempt blew, so I looked for more access. She had a very large AC on her left arm and stated they always use that for blood draws. Since we needed over 15 lab tubes including blood cultures, I decided to use this for my access and collect blood. While I did that, and we were all in the room, the patient kept listing off several things she needed. She was stating she was thirsty, she needed her PM meds, we needed to call her wound doc, etc.; at this time, Dr. Correa politely asked the patient to have some patience and that we needed to do these things first. The patient ignored his statement and continued to make more demands, again, Dr. Correa politely requested that the patient allow us to finish the tasks at hand and once she was settled we would start working on more things. Dr. Correa finished his assessment and left the room to add more labs and more orders, meanwhile, the patient continued to make more demands while also moving on the bed a lot while I am still trying to collect labs. We were trying really hard to get every tube we needed so we would not have to stick her again and so we could begin treating her. Lab orders kept rolling in, so I was still focused on getting the blood in the tubes while she was moving about on the bed. The patient kept moving around, and realized that where I put the needle was going to be an IV and she was very upset with that. She complained that it was supposed to be a vein for lab draw only and now she has to keep her arm straight, etc, and she was very frustrated. At this time, I responded with "Miss, you have " "a great vein there and we need both several labs and an IV, they can always move your IV later on if they need to." After ignoring me, she began listing specific medications that she felt she needed immediately and I basically repeated what Dr. Correa said, I said "Miss, you are continuing not to be patient with us while we finish this and we don't need to hear about your meds from you, we can look those up when we have time." At this time, the patient became very angry. She immediately sat forward and yelled the following "fuck this place, ill go to (some other hospital I don't remember the name) if ya'll are going to treat me like shit". During this time, I backed away and tried to explain "we are just doing our job and trying to help but she is making it hard while continuously making demands." She looked at me and screamed in my face that I was a "raging bitch", "don't fucking touch her", "you're the worst nurse I've ever met", "get the fuck out of my room", "if I see your face again I am fucking leaving", "fuck you", "you're heartless", and other things she was yelling that I don't remember. Both Denia RN and RT were still in the room to witness this. Also, a couple other staff came to the room due to the yelling and were concerned. I left the room at that time and told Dr. Correa. He went back in the room and she began yelling at him as well. He explained that treating the staff that way is not tolerated and reassured me that I did nothing wrong in that situation.   "

## 2022-12-23 NOTE — PLAN OF CARE
[facility]  Discharge Assessment    Spoke with patient regarding discharge planning. Pt stated she has a knee scooter, wc and neb at home. She is currently awaiting a home cpap. Pt stated she is followed by Dr De Los Santos and Dr. Mata.  Per physician, ortho and podiatry consulted. [place] to continue to follow for discharge needs.     Primary Care Provider: Donaldo Pena MD     Discharge Assessment (most recent)       BRIEF DISCHARGE ASSESSMENT - 12/23/22 0840          Discharge Planning    Assessment Type Discharge Planning Brief Assessment     Resource/Environmental Concerns none     Support Systems Family members     Equipment Currently Used at Home wheelchair;nebulizer;other (see comments)   Knee scooter    Current Living Arrangements home     Patient/Family Anticipates Transition to home     Patient/Family Anticipated Services at Transition none     DME Needed Upon Discharge  other (see comments)   TBD    Discharge Plan A Home     Discharge Plan B Home with family

## 2022-12-23 NOTE — PLAN OF CARE
52 year old female with T2DM with associated peripheral neuropathy, COPD, CAD, tobacco, hx of DM ulcerations, Patient presented to ED for wound with subjectively stated purulent draiange. Patient was admitted to [facility] for open wound of left foot. Per chart review afebrile with tmax of 99.9 F. Vital signs stable. No leukocytosis, WBC down trending from 17.51 to 10.94. ESR 77. CRP 74. ALT and AST decreased at 6 and 8 respectively. UA negative. L foot x ray No subcutanous gas, other findings consistent with midfoot charcot. L foot MRI (radiology impression): Large infectious/inflammatory phlegmon at the plantar aspect of the midfoot with multiple small interconnecting abscesses. Advanced Charcot arthropathy throughout the midfoot and probably hindfoot.  Given the proximity to the ulcer and inflammatory phlegmon, early septic arthritis and osteomyelitis cannot be excluded. Wound cultures sent 12/23 pending, gram stain negative.     -Weekday call and  informed Holiday team of patient   -Case requested, will tentatively plan for surgery tomorrow morning. Given limited holiday OR staffing case may be bumped, timing TBD  -Discussed MRI findings and procedure at length over speaker phone with nurse assistance (appreciated). Patient states amenable to surgery   -Will plan to consent patient prior to procedure   -NPO ordered   -Hold next dose of Anticoags and tentatively resume 24 hours after procedure given no postop bleeding complications on    Ariel Szymanski DPM, PGY-2  Podiatry / Foot and Ankle Surgery   Page # [phone]  Secure chat preferred

## 2022-12-23 NOTE — PHARMACY MED REC
12/22/22 Patient confirmed some medications at bedside but could not confirm all medications.              .

## 2022-12-23 NOTE — CONSULTS
[facility] Surg  Wound Care    Patient Name:  Audrey Natarajan   MRN:  [id number]  Date: 2022  Diagnosis: Open wound of left foot    History:     Past Medical History:   Diagnosis Date    ADHD (attention deficit hyperactivity disorder)     Arthritis     Asthma     Bipolar 1 disorder     Cataract     Cigarette smoker     COPD (chronic obstructive pulmonary disease)     Coronary artery disease     A fib    Depression     bipolar manic depresson    Diabetes mellitus     Diabetic foot ulcers     Diabetic neuropathy     DVT of lower extremity, bilateral 2013    bilateral LE DVT. [place] filter placed.     Encounter for blood transfusion     History of blood clots 1. Left Leg=; 2.Bilateral Groin=Blood Clots=  or 2013 & 2013; 3. LLL of Lung=2013;  4. Lt. Lower Leg=2013.     Pt. had 1st Blood Clot after [id number], & Last=. [place] Filter= Rt.Lateral Neck.    HTN (hypertension) 2013    Pt states that she does not have hypertension    Hypercholesteremia     Irregular heartbeat     Neuromuscular disorder     neuropathy feet    Obese     PE (pulmonary embolism) 2013    bilat LE DVT.     Restless leg syndrome        Social History     Socioeconomic History    Marital status: Significant Other   Tobacco Use    Smoking status: Every Day     Packs/day: 1.00     Years: 37.00     Pack years: 37.00     Types: Cigarettes     Last attempt to quit: 2020     Years since quittin.0    Smokeless tobacco: Never    Tobacco comments:     Enrolled in the [employer] on 5/3/14 ([facility] Member ID # [id number]). Ambulatory referral to Smoking Cessation Program   Substance and Sexual Activity    Alcohol use: No     Alcohol/week: 0.0 standard drinks    Drug use: No    Sexual activity: Yes     Partners: Male   Social History Narrative     from her  of 20 years    Lives by herself since 2019     Social Determinants of Health     Financial Resource Strain: Unknown     Difficulty of Paying Living Expenses: Patient refused   Food Insecurity: Unknown    Worried About Running Out of Food in the Last Year: Patient refused    Ran Out of Food in the Last Year: Patient refused   Transportation Needs: Unknown    Lack of Transportation (Medical): Patient refused    Lack of Transportation (Non-Medical): Patient refused   Physical Activity: Unknown    Days of Exercise per Week: Patient refused    Minutes of Exercise per Session: Patient refused   Stress: Unknown    Feeling of Stress : Patient refused   Social Connections: Unknown    Frequency of Communication with Friends and Family: Patient refused    Frequency of Social Gatherings with Friends and Family: Patient refused    Attends Worship Services: Patient refused    Active Member of Clubs or Organizations: Patient refused    Attends Club or Organization Meetings: Patient refused    Marital Status: Patient refused   Housing Stability: Unknown    Unable to Pay for Housing in the Last Year: Patient refused    Unstable Housing in the Last Year: Patient refused       Precautions:     Allergies as of 12/22/2022 - Reviewed 12/22/2022   Allergen Reaction Noted    Morphine Other (See Comments) 11/27/2012    Penicillins Anaphylaxis 11/27/2012    Januvia [sitagliptin] Hives 09/10/2020    Carbamazepine Other (See Comments) 05/06/2022       [facility] Assessment Details/Treatment   Consulted for wounds left foot  A 52 year old female admitted to [facility] 12/22/22 from home with open wound of left foot; Charcot's joint of left foot; PAD; anemia; thrombocytosis; Bipolar 1 disorder; history PE; tobacco abuse; HLD; COPD; essential hypertension; DM II with neurological manifestations  12/23 WBC 10.94 Hgb 9.6 Hct 30.2 Alb 3.0   12/22 A1C 7.1  On Isoflex mattress; Santiago score 19  Patient of [facility]  outpatient wound clinic  Consult to Podiatry- Podiatry to manage foot wound  Reconsult [facility] nurse as needed.    12/23/2022

## 2022-12-23 NOTE — ASSESSMENT & PLAN NOTE
- continue eliquis, aspirin, plavix  -US arterial from 05/2022: Sonogram suggest hemodynamically significant stenosis in the left and DPA. Multifocal mild to moderate grade stenoses is suspected throughout the left MIRACLE and, bilateral posterior tibial and peroneal arteries.

## 2022-12-23 NOTE — NURSING
"Pt attempted to leave the floor. Primary RN and charge RN found pt by elevator and stated for her to go back to room and that she can not leave the floor. The pt stated that she needed to go outside and that the hospital wasn't giving her night meds. RN told pt that she can not leave the floor with her IV in and that if she didn't go back to her room security would be called. Pt refused to return to room. Security called. Pt told security she needed to check on her car that had jessica presents inside. Security stated that they would check on car. Pt escorted back to room. Pt severely agitated. Pt told PCT that she wanted to "beat her f*cking *ss" (referring to Primary RN).   "

## 2022-12-23 NOTE — SUBJECTIVE & OBJECTIVE
"Past Medical History:   Diagnosis Date    ADHD (attention deficit hyperactivity disorder)     Arthritis     Asthma     Bipolar 1 disorder     Cataract     Cigarette smoker     COPD (chronic obstructive pulmonary disease)     Coronary artery disease     A fib    Depression     bipolar manic depresson    Diabetes mellitus     Diabetic foot ulcers     Diabetic neuropathy     DVT of lower extremity, bilateral 07/2013    bilateral LE DVT. [place] filter placed.     Encounter for blood transfusion     History of blood clots 1. Left Leg=2003; 2.Bilateral Groin=Blood Clots= 5 or 6/ 2013 & 7/2013; 3. LLL of Lung=7/2013;  4. Lt. Lower Leg=7/2013.     Pt. had 1st Blood Clot after [id number]=4102, & Last=2013. [place] Filter= Rt.Lateral Neck.    HTN (hypertension) 06/06/2013    Pt states that she does not have hypertension    Hypercholesteremia     Irregular heartbeat     Neuromuscular disorder     neuropathy feet    Obese     PE (pulmonary embolism) 07/2013    bilat LE DVT.     Restless leg syndrome        Past Surgical History:   Procedure Laterality Date    ABDOMINAL SURGERY  2010    gastric sleeve    BILATERAL OOPHORECTOMY Bilateral 1/12/2015    CHOLECYSTECTOMY      DEBRIDEMENT OF FOOT Bilateral 5/10/2022    Procedure: DEBRIDEMENT, FOOT;  Surgeon: Maira De Los Santos DPM;  Location: [facility] OR;  Service: Podiatry;  Laterality: Bilateral;    Green' [place] filter Right 7/4/2012    Right Neck & Tunneled Down.    HERNIA REPAIR      "[place] [facility] [place] [facility] around th [place].", pt. states    LAPAROSCOPIC CHOLECYSTECTOMY N/A 9/10/2020    Procedure: CHOLECYSTECTOMY, LAPAROSCOPIC;  Surgeon: Montrell Gutierrez MD;  Location: [facility] OR;  Service: General;  Laterality: N/A;  RN PREOP 9/9----COVID Negative  9/9    OVARIAN CYST REMOVAL  3/13/2014    SD REMOVAL OF OVARY/TUBE(S)      SPLENECTOMY, TOTAL  July 2003    TONSILLECTOMY      as a child    TYMPANOSTOMY TUBE PLACEMENT  1976    VEIN SURGERY  2003    Lt leg       Review of patient's " allergies indicates:   Allergen Reactions    Morphine Other (See Comments)     Patient had a psychotic episode after taking Morphine  Agitation, hallucinations    Penicillins Anaphylaxis     itching    Januvia [sitagliptin] Hives    Carbamazepine Other (See Comments)     hyponatremia       No current facility-administered medications on file prior to encounter.     Current Outpatient Medications on File Prior to Encounter   Medication Sig    apixaban (ELIQUIS) 5 mg Tab Take 1 tablet (5 mg total) by mouth 2 (two) times daily.    aspirin 81 MG Chew Take 1 tablet (81 mg total) by mouth once daily.    bumetanide (BUMEX) 1 MG tablet Take 1 tablet (1 mg total) by mouth once daily.    DUPIXENT  mg/2 mL PnIj Inject into the skin.    hydrOXYzine (ATARAX) 50 MG tablet Take 50 mg by mouth 4 (four) times daily as needed.    lisinopriL 10 MG tablet Take 1 tablet (10 mg total) by mouth once daily.    meloxicam (MOBIC) 15 MG tablet Take 1 tablet (15 mg total) by mouth once daily.    metFORMIN (GLUCOPHAGE) 1000 MG tablet Take 1 tablet (1,000 mg total) by mouth 2 (two) times daily with meals.    methocarbamoL (ROBAXIN) 500 MG Tab Take 500 mg by mouth. Frequency could not be confirmed.    metoprolol tartrate (LOPRESSOR) 50 MG tablet Take 1 tablet (50 mg total) by mouth 2 (two) times daily.    pravastatin (PRAVACHOL) 40 MG tablet Take 1 tablet (40 mg total) by mouth every evening.    semaglutide (OZEMPIC) 1 mg/dose (4 mg/3 mL) Inject 1 mg into the skin every 7 days.    VYVANSE 40 mg Cap Take 40 mg by mouth once daily.    acetaminophen (TYLENOL) 500 MG tablet Take 2 tablets (1,000 mg total) by mouth every 6 (six) hours as needed for Pain.    albuterol (PROVENTIL/VENTOLIN HFA) 90 mcg/actuation inhaler INHALE 2 PUFFS INTO THE LUNGS EVERY 6 HOURS AS NEEDED FOR WHEEZING. RESCUE    albuterol-ipratropium (DUO-NEB) 2.5 mg-0.5 mg/3 mL nebulizer solution Take 3 mLs by nebulization every 6 (six) hours as needed for Wheezing or Shortness  of Breath. Rescue    ammonium lactate (LAC-HYDRIN) 12 % lotion APPL Y ONCE TOPICALLY TWICE DAILY FOR 30 DAYS    BINAXNOW COVID-19 AG SELF TEST Kit TEST AS DIRECTED TODAY    carBAMazepine (TEGRETOL) 200 mg tablet Take 400 mg by mouth 2 (two) times daily.    ceFEPIme (MAXIPIME) 1 gram injection EMPTY CONTENTS OF 1 VIAL INTO SHAKER. SPRINKLE DIRECTLY ONTO INFECTION SITE. PERFORM ONCE DAILY.    cetirizine (ZYRTEC) 10 MG tablet Take 1 tablet (10 mg total) by mouth once daily. for 10 days    divalproex (DEPAKOTE) 250 MG EC tablet Take 5 tablets (1,250 mg total) by mouth every evening.    divalproex (DEPAKOTE) 500 MG TbEC Take 1 tablet (500 mg total) by mouth once daily. PO QAM    fluticasone propionate (FLONASE) 50 mcg/actuation nasal spray 2 sprays (100 mcg total) by Each Nostril route daily as needed (Nasal congestion).    fluticasone-salmeterol diskus inhaler 250-50 mcg Inhale 1 puff into the lungs 2 (two) times daily. Controller    gabapentin (NEURONTIN) 300 MG capsule TAKE 2 CAPSULES(600 MG) BY MOUTH TWICE DAILY    HYDROcodone-acetaminophen (NORCO)  mg per tablet Take 1 tablet by mouth every 6 (six) hours as needed for Pain.    levoFLOXacin (LEVAQUIN) 750 MG tablet Take 1 tablet (750 mg total) by mouth once daily.    loratadine (CLARITIN) 10 mg tablet Take 1 tablet (10 mg total) by mouth once daily.    multivitamin Tab Take 1 tablet by mouth once daily.    nystatin (NYSTOP) powder APPLY TO ABDOMINAL AND BREAST SKIN FOLD TWICE DAILY.    OLANZapine (ZYPREXA) 10 MG tablet Take 1 tablet (10 mg total) by mouth every 8 (eight) hours as needed (Agitation).    pantoprazole (PROTONIX) 40 MG tablet Take 1 tablet (40 mg total) by mouth once daily.    potassium chloride (MICRO-K) 10 MEQ CpSR Take 1 capsule (10 mEq total) by mouth once daily.    risperiDONE (RISPERDAL M-TABS) 3 MG disintegrating tablet Take 1 tablet (3 mg total) by mouth 2 (two) times daily.    triamcinolone acetonide 0.025% (KENALOG) 0.025 % cream Apply  topically 2 (two) times daily.    [DISCONTINUED] diclofenac sodium (VOLTAREN) 1 % Gel Apply 2 g topically 4 (four) times daily as needed (Apply to painful area up to 4 times a day as needed for pain). Apply to painful area 4 times a day as needed for pain    [DISCONTINUED] furosemide (LASIX) 20 MG tablet TAKE 1 TABLET(20 MG) BY MOUTH EVERY DAY    [DISCONTINUED] QUEtiapine (SEROQUEL) 200 MG Tab Take 1 tablet (200 mg total) by mouth before breakfast.     Family History       Problem Relation (Age of Onset)    Cataracts Father    Diabetes Father, Paternal Grandfather    Heart disease Father, Paternal Grandfather    Hypertension Father    No Known Problems Mother, Sister, Brother, Maternal Aunt, Maternal Uncle, Paternal Aunt, Paternal Uncle, Maternal Grandfather    Ovarian cancer Maternal Grandmother, Paternal Grandmother          Tobacco Use    Smoking status: Every Day     Packs/day: 1.00     Years: 37.00     Pack years: 37.00     Types: Cigarettes     Last attempt to quit: 2020     Years since quittin.0    Smokeless tobacco: Never    Tobacco comments:     Enrolled in the [employer] on 5/3/14 ([facility] Member ID # [id number]). Ambulatory referral to Smoking Cessation Program   Substance and Sexual Activity    Alcohol use: No     Alcohol/week: 0.0 standard drinks    Drug use: No    Sexual activity: Yes     Partners: Male     Review of Systems   Constitutional:  Negative for chills and fever.   HENT:  Negative for congestion, postnasal drip and rhinorrhea.    Eyes:  Negative for visual disturbance.   Respiratory:  Negative for chest tightness, shortness of breath and wheezing.    Cardiovascular:  Negative for chest pain.   Gastrointestinal:  Negative for abdominal pain, nausea and vomiting.   Endocrine: Negative for polyuria.   Genitourinary:  Negative for difficulty urinating.   Musculoskeletal:  Negative for arthralgias and myalgias.   Skin:  Positive for wound.   Neurological:  Negative for dizziness and  weakness.   Hematological:  Does not bruise/bleed easily.   Psychiatric/Behavioral:  Negative for agitation.    Objective:     Vital Signs (Most Recent):  Temp: 97.8 °F (36.6 °C) (12/22/22 2356)  Pulse: 96 (12/22/22 2356)  Resp: 18 (12/22/22 2356)  BP: (!) 176/77 (12/22/22 2356)  SpO2: (!) 94 % (12/22/22 2356)   Vital Signs (24h Range):  Temp:  [97.8 °F (36.6 °C)-99.8 °F (37.7 °C)] 97.8 °F (36.6 °C)  Pulse:  [] 96  Resp:  [18-19] 18  SpO2:  [94 %-99 %] 94 %  BP: (114-176)/(58-77) 176/77     Weight: 106.6 kg (235 lb)  Body mass index is 37.93 kg/m².    Physical Exam  Constitutional:       Appearance: Normal appearance.   HENT:      Head: Normocephalic and atraumatic.      Nose: No congestion or rhinorrhea.      Mouth/Throat:      Mouth: Mucous membranes are moist.   Cardiovascular:      Rate and Rhythm: Normal rate.      Pulses: Normal pulses.   Pulmonary:      Effort: Pulmonary effort is normal.   Abdominal:      General: Bowel sounds are normal. There is no distension.      Palpations: Abdomen is soft.   Musculoskeletal:         General: Normal range of motion.      Cervical back: Normal range of motion and neck supple.   Skin:     General: Skin is warm and dry.      Comments: L foot plantar surface wound, erythema,  consistent with cellulitis  There is no foul smell there is no visible drainage  Left lower extremity swelling    Neurological:      Mental Status: She is alert and oriented to person, place, and time.   Psychiatric:         Mood and Affect: Mood is anxious.           Significant Labs: All pertinent labs within the past 24 hours have been reviewed.    Significant Imaging: I have reviewed all pertinent imaging results/findings within the past 24 hours.

## 2022-12-23 NOTE — CONSULTS
[facility] Surg  Podiatry  Consult Note    Patient Name: Audrey Natarajan  MRN: [id number]  Admission Date: 12/22/2022  Hospital Length of Stay: 0 days  Attending Physician: Velvet Galicia DO  Primary Care Provider: Donaldo Pena MD     Inpatient consult to Podiatry  Consult performed by: Maira De Los Santos DPM  Consult ordered by: Betty Correa MD      Subjective:     History of Present Illness: Audrey Natarajan is a 52 y.o. female with  has a past medical history of ADHD (attention deficit hyperactivity disorder), Arthritis, Asthma, Bipolar 1 disorder, Cataract, Cigarette smoker, COPD (chronic obstructive pulmonary disease), Coronary artery disease, Depression, Diabetes mellitus, Diabetic foot ulcers, Diabetic neuropathy, DVT of lower extremity, bilateral, Encounter for blood transfusion, History of blood clots, HTN (hypertension), Hypercholesteremia, Irregular heartbeat, Neuromuscular disorder, Obese, PE (pulmonary embolism), and Restless leg syndrome.  Consulted to the podiatry clinic for evaluation and treatment of foot ulcer.   Location: plantar and midfoot.  Patient is known to me and followed in wound clinic.  Onset of the symptoms was several months ago. Precipitating event:  charcot deformity and edema lead to blister to plantar foot .  On our last wound care encounter I informed patient that I was concerned about increased depth and evidence of excess pressure to the foot.  She subsequently ordered a kneeler scooter, arrived at her home yesterday.  However she states leg and foot had been swelling and more painful despite being on PO antibiotics and she was afraid of sever infection or DVT and presented to the ED.     Chief Complaint   Patient presents with    Wound Check     The patient reports that she has a wound to her left foot x 7 months and it has been feeling hot and painful since last week. Patient reports that her wound is draining purulent drainage. Patient is unsure if she  has had a fever. She states that the wound care physician sent her to the ED for antibiotics.             Scheduled Meds:   aztreonam  1,000 mg Intravenous Q12H    ciprofloxacin  400 mg Intravenous Q12H    divalproex  1,250 mg Oral QHS    divalproex  500 mg Oral Daily    fluticasone furoate-vilanteroL  1 puff Inhalation Daily    gabapentin  600 mg Oral BID    lisinopriL  10 mg Oral Daily    metoprolol tartrate  50 mg Oral BID    multivitamin  1 tablet Oral Daily    pravastatin  40 mg Oral QHS    risperiDONE  3 mg Oral BID    vancomycin (VANCOCIN) IVPB  1,750 mg Intravenous Q12H     Continuous Infusions:  PRN Meds:acetaminophen, albuterol-ipratropium, aluminum-magnesium hydroxide-simethicone, dextrose 10%, dextrose 10%, fluticasone propionate, glucagon (human recombinant), glucose, glucose, hydrOXYzine, insulin aspart U-100, melatonin, naloxone, ondansetron, sodium chloride 0.9%, Pharmacy to dose Vancomycin consult **AND** vancomycin - pharmacy to dose    Review of patient's allergies indicates:   Allergen Reactions    Morphine Other (See Comments)     Patient had a psychotic episode after taking Morphine  Agitation, hallucinations    Penicillins Anaphylaxis     itching    Januvia [sitagliptin] Hives    Carbamazepine Other (See Comments)     hyponatremia        Past Medical History:   Diagnosis Date    ADHD (attention deficit hyperactivity disorder)     Arthritis     Asthma     Bipolar 1 disorder     Cataract     Cigarette smoker     COPD (chronic obstructive pulmonary disease)     Coronary artery disease     A fib    Depression     bipolar manic depresson    Diabetes mellitus     Diabetic foot ulcers     Diabetic neuropathy     DVT of lower extremity, bilateral 07/2013    bilateral LE DVT. [place] filter placed.     Encounter for blood transfusion     History of blood clots 1. Left Leg=2003; 2.Bilateral Groin=Blood Clots= 5 or 6/ 2013 & 7/2013; 3. LLL of Lung=7/2013;  4. Lt. Lower Leg=7/2013.     Pt. had 1st  "Blood Clot after Mjepyhzlutqr=0580, & Last=. Estelita Filter= Rt.Lateral Neck.    HTN (hypertension) 2013    Pt states that she does not have hypertension    Hypercholesteremia     Irregular heartbeat     Neuromuscular disorder     neuropathy feet    Obese     PE (pulmonary embolism) 2013    bilat LE DVT.     Restless leg syndrome      Past Surgical History:   Procedure Laterality Date    ABDOMINAL SURGERY  2010    gastric sleeve    BILATERAL OOPHORECTOMY Bilateral 2015    CHOLECYSTECTOMY      DEBRIDEMENT OF FOOT Bilateral 5/10/2022    Procedure: DEBRIDEMENT, FOOT;  Surgeon: Maira De Los Santos DPM;  Location: [facility] OR;  Service: Podiatry;  Laterality: Bilateral;    Green' s filter Right 2012    Right Neck & Tunneled Down.    HERNIA REPAIR      "Cicero of Hernias Repaires around th [place].", pt. states    LAPAROSCOPIC CHOLECYSTECTOMY N/A 9/10/2020    Procedure: CHOLECYSTECTOMY, LAPAROSCOPIC;  Surgeon: Montrell Gutierrez MD;  Location: [facility] OR;  Service: General;  Laterality: N/A;  RN PREOP ----COVID Negative      OVARIAN CYST REMOVAL  3/13/2014    NC REMOVAL OF OVARY/TUBE(S)      SPLENECTOMY, TOTAL  2003    TONSILLECTOMY      as a child    TYMPANOSTOMY TUBE PLACEMENT      VEIN SURGERY      Lt leg       Family History       Problem Relation (Age of Onset)    Cataracts Father    Diabetes Father, Paternal Grandfather    Heart disease Father, Paternal Grandfather    Hypertension Father    No Known Problems Mother, Sister, Brother, Maternal Aunt, Maternal Uncle, Paternal Aunt, Paternal Uncle, Maternal Grandfather    Ovarian cancer Maternal Grandmother, Paternal Grandmother          Tobacco Use    Smoking status: Every Day     Packs/day: 1.00     Years: 37.00     Pack years: 37.00     Types: Cigarettes     Last attempt to quit: 2020     Years since quittin.0    Smokeless tobacco: Never    Tobacco comments:     Enrolled in the [employer] on 5/3/14 ([employer] Member ID # " [id number]). Ambulatory referral to Smoking Cessation Program   Substance and Sexual Activity    Alcohol use: No     Alcohol/week: 0.0 standard drinks    Drug use: No    Sexual activity: Yes     Partners: Male     Review of Systems   Constitutional:  Positive for activity change and chills. Negative for appetite change and fever.   Respiratory:  Positive for cough (smoker). Negative for shortness of breath.    Cardiovascular:  Positive for leg swelling. Negative for chest pain.   Gastrointestinal:  Negative for diarrhea, nausea and vomiting.   Musculoskeletal:  Positive for arthralgias and myalgias.   Skin:  Positive for color change and wound.   Neurological:  Positive for numbness. Negative for weakness.        + paresthesia    Objective:     Vital Signs (Most Recent):  Temp: 98.3 °F (36.8 °C) (12/23/22 0426)  Pulse: 97 (12/23/22 0426)  Resp: 18 (12/23/22 0426)  BP: (!) 143/64 (12/23/22 0426)  SpO2: 97 % (12/23/22 0426)   Vital Signs (24h Range):  Temp:  [97.8 °F (36.6 °C)-99.8 °F (37.7 °C)] 98.3 °F (36.8 °C)  Pulse:  [] 97  Resp:  [16-19] 18  SpO2:  [94 %-99 %] 97 %  BP: (114-176)/(58-77) 143/64     Weight: 106.6 kg (235 lb)  Body mass index is 37.93 kg/m².       Physical Exam  Nursing note reviewed.   Constitutional:       General: She is not in acute distress.     Appearance: She is not toxic-appearing or diaphoretic.   Cardiovascular:      Pulses:           Dorsalis pedis pulses are 1+ on the right side and 1+ on the left side.        Posterior tibial pulses are 2+ on the right side and 2+ on the left side.   Pulmonary:      Effort: No respiratory distress.   Musculoskeletal:      Right lower leg: Edema present.      Left lower leg: Edema present.      Right ankle: Swelling present. No lateral malleolus, medial malleolus, AITF ligament, CF ligament or posterior TF ligament tenderness. Decreased range of motion.      Right Achilles Tendon: No defects. Paniagua's test negative.      Left ankle: Swelling  present. No lateral malleolus, medial malleolus, AITF ligament, CF ligament, posterior TF ligament or proximal fibula tenderness. Decreased range of motion.      Left Achilles Tendon: No defects. Paniagua's test negative.      Right foot: Swelling and tenderness present.      Left foot: Swelling, Charcot foot and tenderness present.      Comments: There is equinus deformity bilateral with decreased dorsiflexion at the ankle joint bilateral    Decreased first MPJ range of motion both weightbearing and nonweightbearing, no crepitus observed the first MP joint, + dorsal flag sign. Mild  bunion deformity is observed .    Patient has hammertoes of digits 2-5 bilateral partially reducible without symptom today.     Skin:     General: Skin is warm and dry.      Coloration: Skin is not pale.      Findings: Erythema and wound (see below) present. No laceration.      Nails: There is no clubbing.   Neurological:      Sensory: No sensory deficit.      Motor: No tremor, atrophy or abnormal muscle tone.      Deep Tendon Reflexes: Reflexes are normal and symmetric.      Comments: Paresthesias, and hyperesthesia bilateral feet at toes with no clearly identified trigger or source.    Fountain-Gilberto 5.07 monofilament is intact bilateral feet. Sharp/dull sensation is also intact Bilateral feet.   Psychiatric:         Attention and Perception: She is attentive.         Mood and Affect: Mood is not anxious. Affect is not inappropriate.         Speech: She is communicative. Speech is not slurred.         Behavior: Behavior is not combative.       12/23/2022        Laboratory:  A1C:   Recent Labs   Lab 09/08/22  0546 09/20/22  0957   HGBA1C 9.1* 8.6*     CBC:   Recent Labs   Lab 12/23/22  0433   WBC 10.94   RBC 3.88*   HGB 9.6*   HCT 30.2*   *   MCV 78*   MCH 24.7*   MCHC 31.8*     CMP:   Recent Labs   Lab 12/23/22  0433   *   CALCIUM 8.6*   ALBUMIN 3.0*   PROT 6.6   *   K 4.1   CO2 25      BUN 10   CREATININE  0.7   ALKPHOS 84   ALT 7*   AST 8*   BILITOT 0.3     CRP:   Recent Labs   Lab 12/22/22 1755   CRP 74.0*     ESR:   Recent Labs   Lab 12/22/22 1755   SEDRATE 77*     Microbiology Results (last 7 days)       Procedure Component Value Units Date/Time    Blood culture x two cultures. Draw prior to antibiotics. [[id number]] Collected: 12/22/22 1751    Order Status: Completed Specimen: Blood from Peripheral, Antecubital, Left Updated: 12/23/22 0312     Blood Culture, Routine No Growth to date    Narrative:      Aerobic and anaerobic    Blood culture x two cultures. Draw prior to antibiotics. [[id number]] Collected: 12/22/22 1743    Order Status: Completed Specimen: Blood from Peripheral, Forearm, Left Updated: 12/23/22 0312     Blood Culture, Routine No Growth to date    Narrative:      Aerobic and anaerobic            Diagnostic Results:  Imaging Results              US Lower Extremity Veins Left (Final result)  Result time 12/22/22 19:28:06      Final result by Gabe Chou MD (12/22/22 19:28:06)                   Impression:      No evidence of deep venous thrombosis in the left lower extremity.      Electronically signed by: Gabe Chou MD  Date:    12/22/2022  Time:    19:28               Narrative:    EXAMINATION:  US LOWER EXTREMITY VEINS LEFT    CLINICAL HISTORY:  Edema, unspecified    TECHNIQUE:  Duplex and color flow Doppler evaluation and graded compression of the left lower extremity veins was performed.    COMPARISON:  09/12/2022    FINDINGS:  Duplex and color flow Doppler evaluation does not reveal any evidence of acute venous thrombosis in the common femoral, superficial femoral, greater saphenous, popliteal, peroneal, anterior tibial and posterior tibial veins of the left lower extremity.  There is no reflux to suggest valvular incompetence.                                       X-Ray Chest AP Portable (Final result)  Result time 12/22/22 19:00:12      Final result by Gabe Chou MD  (12/22/22 19:00:12)                   Impression:      1. Improved interstitial attenuation, residual edema is not excluded.  No large focal consolidation.      Electronically signed by: Gabe Chou MD  Date:    12/22/2022  Time:    19:00               Narrative:    EXAMINATION:  XR CHEST AP PORTABLE    CLINICAL HISTORY:  Sepsis;    TECHNIQUE:  Single frontal view of the chest was performed.    COMPARISON:  09/12/2022    FINDINGS:  The cardiomediastinal silhouette is prominent, similar to the previous examination noting some magnification by technique..  There is no pleural effusion.  The trachea is midline.  The lungs are symmetrically expanded bilaterally with coarse interstitial attenuation, improved since the previous exam..  No large focal consolidation seen.  There is no pneumothorax.  The osseous structures are remarkable for degenerative changes.                                       X-Ray Foot Complete Left (Final result)  Result time 12/22/22 19:36:46      Final result by Halle Gruber MD (12/22/22 19:36:46)                   Impression:      Large plantar ulceration.  No acute fracture.  As above described.      Electronically signed by: Halle Gruber  Date:    12/22/2022  Time:    19:36               Narrative:    EXAMINATION:  THREE VIEWS OF THE LEFT FOOT    CLINICAL HISTORY:  Cellulitis, unspecified    TECHNIQUE:  AP, lateral and oblique views of the left foot    COMPARISON:  12/16/2022    FINDINGS:  There is a large ulceration or soft tissue defect at the plantar aspect of the foot.  There is diffuse soft tissue thickening or edema of the foot.  Three views of the left foot demonstrate no acute fracture or dislocation.  Degenerative changes seen at the 1st metatarsal-phalangeal joint.  There is Lisfranc and developing Charcot deformity.  There is sparing or osteophytic formation involving the dorsum of the navicular.  There is calcaneal spurring.  The bones are diffusely osteopenic.                                         Assessment/Plan:     Active Diagnoses:    Diagnosis Date Noted POA    PRINCIPAL PROBLEM:  Open wound of left foot [S91.302A] 09/08/2022 Yes    Charcot's joint of left foot [M14.672]  Yes    PAD (peripheral artery disease) [I73.9] 05/06/2022 Yes    Anemia [D64.9] 04/13/2022 Yes    Thrombocytosis [D75.839] 07/16/2016 Yes    History of pulmonary embolus (PE) [Z86.711] 04/13/2015 Yes     Chronic    History of DVT (deep vein thrombosis) [Z86.718] 04/13/2015 Not Applicable     Chronic    Bipolar 1 disorder [F31.9] 04/13/2015 Yes     Chronic    Tobacco abuse [Z72.0] 03/06/2014 Yes     Chronic    Hyperlipidemia [E78.5] 02/13/2014 Yes    COPD (chronic obstructive pulmonary disease) [J44.9] 10/11/2013 Yes     Chronic    Essential hypertension [I10] 06/06/2013 Yes     Chronic    Type II diabetes mellitus with neurological manifestations [E11.49] 06/06/2013 Yes      Problems Resolved During this Admission:     Education about the prevention of limb loss.    Discussed wound healing cycle, skin integrity, ways to care for skin.Counseled patient on the effects of smoking, PVD and blood glucose on healing. She verbalizes understanding that it can increase the chances of delayed healing and this prolonged exposure leads to infection or progression of infection which subsequently can result in loss of limb.    Reviewed imaging, unremarkable    However clinically wound has increased in depth with increase to local edema and erythema    MRI ordered to evaluate for abscess or acute OM.    The wound is cleansed of foreign material as much as possible and the base inspected for bone or abscess.  Base is fibrogranular and without bone nor joint exposure. Aerobic and anerobic cultures swabs taken    DSD applied.     qShift vashe wet to dry dressing ordered    If MRI negative, wound can be managed outpatient and patient is to follow in wound clinic     Short-term goals include maintaining good offloading and  minimizing bioburden, promoting granulation and epithelialization to healing.  Long-term goals include keeping the wound healed by good offloading and medical management under the direction of internist.    Shoe inspection. Diabetic Foot Education. Patient reminded of the importance of good nutrition and blood sugar control to help prevent podiatric complications of diabetes. Patient instructed on proper foot hygeine. We discussed wearing proper shoe gear, daily foot inspections, never walking without protective shoe gear, never putting sharp instruments to feet.         Thank you for your consult. I will follow-up with patient. Please contact us if you have any additional questions.    Maira De Los Santos DPM  Podiatry  [facility] [place] - Med Surg

## 2022-12-23 NOTE — HPI
52 y.o. female with PMH of DM2, Charcot's foot, osteomyelitis of the foot, diabetic foot ulcer, b/l LE DVTs (on Eliquis), afib on eliquis and has a nelly filter, MRSA, PAD, h/o PE, COPD, HTN, Bipolar I, tobacco abuse, who presented presents to the emergency department for evaluation of chronic wound to her left foot.  Per wound care note pt is currently on oral abx. Does not feels well, was concerned regarding another potential clot and wound infection. ED workup revealed WBCs 17.51, H/H 10.3/30.9, Sed rate elevated, CRP elevated, blood cultures pending, chest x-ray showed improved interstitial attenuation, residual edema is not excluded.  No large focal consolidation.  Left foot x-ray showed large plantar ulceration.  No acute fracture.  Patient being admitted to hospital medicine for further medical management with podiatry and ortho consults.

## 2022-12-23 NOTE — NURSING
Pt off the unit going to MRI by wheelchair via transport. IV access in place, saline locked. NAD or pain noted.
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
## 2022-12-23 NOTE — PROGRESS NOTES
Pharmacokinetic Initial Assessment: IV Vancomycin    Assessment/Plan:    Initiate intravenous vancomycin with loading dose of 2000 mg once followed by a maintenance dose of vancomycin 1750mg IV every 12 hours  Desired empiric serum trough concentration is 10 to 20 mcg/mL  Draw vancomycin trough level 60 min prior to fourth dose on 12/24/22 at approximately 0730  Pharmacy will continue to follow and monitor vancomycin.      Please contact pharmacy at extension [phone] with any questions regarding this assessment.     Thank you for the consult,   Augustina Arredondo       Patient brief summary:  Audrey Natarajan is a 52 y.o. female initiated on antimicrobial therapy with IV Vancomycin for treatment of suspected skin & soft tissue infection    Drug Allergies:   Review of patient's allergies indicates:   Allergen Reactions    Morphine Other (See Comments)     Patient had a psychotic episode after taking Morphine  Agitation, hallucinations    Penicillins Anaphylaxis     itching    Januvia [sitagliptin] Hives    Carbamazepine Other (See Comments)     hyponatremia       Actual Body Weight:   106.6 kg     Renal Function:   Estimated Creatinine Clearance: 118.7 mL/min (based on SCr of 0.7 mg/dL).,     Dialysis Method (if applicable):  N/A    CBC (last 72 hours):  Recent Labs   Lab Result Units 12/22/22  1755   WBC K/uL 17.51*   Hemoglobin g/dL 10.3*   Hematocrit % 30.9*   Platelets K/uL 665*   Gran % % 49.1   Lymph % % 36.8   Mono % % 11.4   Eosinophil % % 1.7   Basophil % % 0.5   Differential Method  Automated       Metabolic Panel (last 72 hours):  Recent Labs   Lab Result Units 12/22/22  1755   Sodium mmol/L 137   Potassium mmol/L 4.2   Chloride mmol/L 98   CO2 mmol/L 23   Glucose mg/dL 95   BUN mg/dL 11   Creatinine mg/dL 0.7   Albumin g/dL 3.1*   Total Bilirubin mg/dL 0.3   Alkaline Phosphatase U/L 88   AST U/L 6*   ALT U/L 7*   Magnesium mg/dL 1.1*   Phosphorus mg/dL 2.7       Drug levels (last 3 results):  No results for  input(s): VANCOMYCINRA, VANCORANDOM, VANCOMYCINPE, VANCOPEAK, VANCOMYCINTR, VANCOTROUGH in the last 72 hours.    Microbiologic Results:  Microbiology Results (last 7 days)       Procedure Component Value Units Date/Time    Blood culture x two cultures. Draw prior to antibiotics. [[id number]] Collected: 12/22/22 1751    Order Status: Sent Specimen: Blood from Peripheral, Antecubital, Left Updated: 12/22/22 1805    Blood culture x two cultures. Draw prior to antibiotics. [[id number]] Collected: 12/22/22 1743    Order Status: Sent Specimen: Blood from Peripheral, Forearm, Left Updated: 12/22/22 1805

## 2022-12-23 NOTE — ASSESSMENT & PLAN NOTE
Meds: SSI PRN to maintain goal 140-180  Resume home regimen at discharge  Hypoglycemia protocol PRN  Continue statin

## 2022-12-23 NOTE — TELEPHONE ENCOUNTER
----- Message from Alexis S. Barthelemy, MA sent at 12/23/2022  8:21 AM CST -----    ----- Message -----  From: Opal Ventura  Sent: 12/22/2022   6:22 PM CST  To: Magali Rao Staff    Type:  Patient Returning Call    Who Called: pt   Who Left Message for Patient: pt   Does the patient know what this is regarding?: pt need a call she was in the ED to night   Would the patient rather a call back or a response via [facility]?  Call   Best Call Back Number:[phone]  Additional Information:  call back

## 2022-12-23 NOTE — ED NOTES
"While starting pt's IV, pt asks for something to eat and something to drink. Another RN at bedside said to pt, "let us start this IV and get your bloodwork sent to lab and then we can work on that". Pt becomes angry and yells, "Do not talk to me like that b**ch!" RN attempts to explain to patient that she just wanted to have the labs sent off to be processed since it takes a bit of time to receive results and that they are important to decide more of pt's plan of care but pt continues to yell over RN, stating "You're a b*tch, you are the worst nurse I've ever had". Pt tells RN "I want you to get the f**k out of my room and dont come back in here". RN leaves the room and while scanning pt's bloodwork into the computer, pt continuously stating to me how "terrible that nurse is, and that she is a b*tch". MD notified and comes to bedside.   "

## 2022-12-23 NOTE — H&P
[facility] Medicine  History & Physical    Patient Name: Audrey Natarajan  MRN: [id number]  Patient Class: OP- Observation  Admission Date: 12/22/2022  Attending Physician: Keyona Darden MD   Primary Care Provider: Donaldo Pena MD         Patient information was obtained from patient and ER records.     Subjective:     Principal Problem:Open wound of left foot    Chief Complaint:   Chief Complaint   Patient presents with    Wound Check     The patient reports that she has a wound to her left foot x 7 months and it has been feeling hot and painful since last week. Patient reports that her wound is draining purulent drainage. Patient is unsure if she has had a fever. She states that the wound care physician sent her to the ED for antibiotics.        HPI: 52 y.o. female with PMH of DM2, Charcot's foot, osteomyelitis of the foot, diabetic foot ulcer, b/l LE DVTs (on Eliquis), afib on eliquis and has a nelly filter, MRSA, PAD, h/o PE, COPD, HTN, Bipolar I, tobacco abuse, who presented presents to the emergency department for evaluation of chronic wound to her left foot.  Per wound care note pt is currently on oral abx. Does not feels well, was concerned regarding another potential clot and wound infection. ED workup revealed WBCs 17.51, H/H 10.3/30.9, Sed rate elevated, CRP elevated, blood cultures pending, chest x-ray showed improved interstitial attenuation, residual edema is not excluded.  No large focal consolidation.  Left foot x-ray showed large plantar ulceration.  No acute fracture.  Patient being admitted to hospital medicine for further medical management with podiatry and ortho consults.      Past Medical History:   Diagnosis Date    ADHD (attention deficit hyperactivity disorder)     Arthritis     Asthma     Bipolar 1 disorder     Cataract     Cigarette smoker     COPD (chronic obstructive pulmonary disease)     Coronary artery disease     A fib    Depression      "bipolar manic depresson    Diabetes mellitus     Diabetic foot ulcers     Diabetic neuropathy     DVT of lower extremity, bilateral 07/2013    bilateral LE DVT. [place] filter placed.     Encounter for blood transfusion     History of blood clots 1. Left Leg=2003; 2.Bilateral Groin=Blood Clots= 5 or 6/ 2013 & 7/2013; 3. LLL of Lung=7/2013;  4. Lt. Lower Leg=7/2013.     Pt. had 1st Blood Clot after Fllaaiwqelfq=0651, & Last=2013. [place] Filter= Rt.Lateral Neck.    HTN (hypertension) 06/06/2013    Pt states that she does not have hypertension    Hypercholesteremia     Irregular heartbeat     Neuromuscular disorder     neuropathy feet    Obese     PE (pulmonary embolism) 07/2013    bilat LE DVT.     Restless leg syndrome        Past Surgical History:   Procedure Laterality Date    ABDOMINAL SURGERY  2010    gastric sleeve    BILATERAL OOPHORECTOMY Bilateral 1/12/2015    CHOLECYSTECTOMY      DEBRIDEMENT OF FOOT Bilateral 5/10/2022    Procedure: DEBRIDEMENT, FOOT;  Surgeon: Maira De Los Santos DPM;  Location: [facility] OR;  Service: Podiatry;  Laterality: Bilateral;    Green' s filter Right 7/4/2012    Right Neck & Tunneled Down.    HERNIA REPAIR      "[place] of Hernias [employer] around th [place].", pt. states    LAPAROSCOPIC CHOLECYSTECTOMY N/A 9/10/2020    Procedure: CHOLECYSTECTOMY, LAPAROSCOPIC;  Surgeon: Montrell Gutierrez MD;  Location: [facility] OR;  Service: General;  Laterality: N/A;  RN PREOP 9/9----COVID Negative  9/9    OVARIAN CYST REMOVAL  3/13/2014    AK REMOVAL OF OVARY/TUBE(S)      SPLENECTOMY, TOTAL  July 2003    TONSILLECTOMY      as a child    TYMPANOSTOMY TUBE PLACEMENT  1976    VEIN SURGERY  2003    Lt leg       Review of patient's allergies indicates:   Allergen Reactions    Morphine Other (See Comments)     Patient had a psychotic episode after taking Morphine  Agitation, hallucinations    Penicillins Anaphylaxis     itching    Januvia [sitagliptin] Hives    Carbamazepine " Other (See Comments)     hyponatremia       No current facility-administered medications on file prior to encounter.     Current Outpatient Medications on File Prior to Encounter   Medication Sig    apixaban (ELIQUIS) 5 mg Tab Take 1 tablet (5 mg total) by mouth 2 (two) times daily.    aspirin 81 MG Chew Take 1 tablet (81 mg total) by mouth once daily.    bumetanide (BUMEX) 1 MG tablet Take 1 tablet (1 mg total) by mouth once daily.    DUPIXENT  mg/2 mL PnIj Inject into the skin.    hydrOXYzine (ATARAX) 50 MG tablet Take 50 mg by mouth 4 (four) times daily as needed.    lisinopriL 10 MG tablet Take 1 tablet (10 mg total) by mouth once daily.    meloxicam (MOBIC) 15 MG tablet Take 1 tablet (15 mg total) by mouth once daily.    metFORMIN (GLUCOPHAGE) 1000 MG tablet Take 1 tablet (1,000 mg total) by mouth 2 (two) times daily with meals.    methocarbamoL (ROBAXIN) 500 MG Tab Take 500 mg by mouth. Frequency could not be confirmed.    metoprolol tartrate (LOPRESSOR) 50 MG tablet Take 1 tablet (50 mg total) by mouth 2 (two) times daily.    pravastatin (PRAVACHOL) 40 MG tablet Take 1 tablet (40 mg total) by mouth every evening.    semaglutide (OZEMPIC) 1 mg/dose (4 mg/3 mL) Inject 1 mg into the skin every 7 days.    VYVANSE 40 mg Cap Take 40 mg by mouth once daily.    acetaminophen (TYLENOL) 500 MG tablet Take 2 tablets (1,000 mg total) by mouth every 6 (six) hours as needed for Pain.    albuterol (PROVENTIL/VENTOLIN HFA) 90 mcg/actuation inhaler INHALE 2 PUFFS INTO THE LUNGS EVERY 6 HOURS AS NEEDED FOR WHEEZING. RESCUE    albuterol-ipratropium (DUO-NEB) 2.5 mg-0.5 mg/3 mL nebulizer solution Take 3 mLs by nebulization every 6 (six) hours as needed for Wheezing or Shortness of Breath. Rescue    ammonium lactate (LAC-HYDRIN) 12 % lotion APPL Y ONCE TOPICALLY TWICE DAILY FOR 30 DAYS    BINAXNOW COVID-19 AG SELF TEST Kit TEST AS DIRECTED TODAY    carBAMazepine (TEGRETOL) 200 mg tablet Take 400 mg by  mouth 2 (two) times daily.    ceFEPIme (MAXIPIME) 1 gram injection EMPTY CONTENTS OF 1 VIAL INTO SHAKER. SPRINKLE DIRECTLY ONTO INFECTION SITE. PERFORM ONCE DAILY.    cetirizine (ZYRTEC) 10 MG tablet Take 1 tablet (10 mg total) by mouth once daily. for 10 days    divalproex (DEPAKOTE) 250 MG EC tablet Take 5 tablets (1,250 mg total) by mouth every evening.    divalproex (DEPAKOTE) 500 MG TbEC Take 1 tablet (500 mg total) by mouth once daily. PO QAM    fluticasone propionate (FLONASE) 50 mcg/actuation nasal spray 2 sprays (100 mcg total) by Each Nostril route daily as needed (Nasal congestion).    fluticasone-salmeterol diskus inhaler 250-50 mcg Inhale 1 puff into the lungs 2 (two) times daily. Controller    gabapentin (NEURONTIN) 300 MG capsule TAKE 2 CAPSULES(600 MG) BY MOUTH TWICE DAILY    HYDROcodone-acetaminophen (NORCO)  mg per tablet Take 1 tablet by mouth every 6 (six) hours as needed for Pain.    levoFLOXacin (LEVAQUIN) 750 MG tablet Take 1 tablet (750 mg total) by mouth once daily.    loratadine (CLARITIN) 10 mg tablet Take 1 tablet (10 mg total) by mouth once daily.    multivitamin Tab Take 1 tablet by mouth once daily.    nystatin (NYSTOP) powder APPLY TO ABDOMINAL AND BREAST SKIN FOLD TWICE DAILY.    OLANZapine (ZYPREXA) 10 MG tablet Take 1 tablet (10 mg total) by mouth every 8 (eight) hours as needed (Agitation).    pantoprazole (PROTONIX) 40 MG tablet Take 1 tablet (40 mg total) by mouth once daily.    potassium chloride (MICRO-K) 10 MEQ CpSR Take 1 capsule (10 mEq total) by mouth once daily.    risperiDONE (RISPERDAL M-TABS) 3 MG disintegrating tablet Take 1 tablet (3 mg total) by mouth 2 (two) times daily.    triamcinolone acetonide 0.025% (KENALOG) 0.025 % cream Apply topically 2 (two) times daily.    [DISCONTINUED] diclofenac sodium (VOLTAREN) 1 % Gel Apply 2 g topically 4 (four) times daily as needed (Apply to painful area up to 4 times a day as needed for pain). Apply to  painful area 4 times a day as needed for pain    [DISCONTINUED] furosemide (LASIX) 20 MG tablet TAKE 1 TABLET(20 MG) BY MOUTH EVERY DAY    [DISCONTINUED] QUEtiapine (SEROQUEL) 200 MG Tab Take 1 tablet (200 mg total) by mouth before breakfast.     Family History       Problem Relation (Age of Onset)    Cataracts Father    Diabetes Father, Paternal Grandfather    Heart disease Father, Paternal Grandfather    Hypertension Father    No Known Problems Mother, Sister, Brother, Maternal Aunt, Maternal Uncle, Paternal Aunt, Paternal Uncle, Maternal Grandfather    Ovarian cancer Maternal Grandmother, Paternal Grandmother          Tobacco Use    Smoking status: Every Day     Packs/day: 1.00     Years: 37.00     Pack years: 37.00     Types: Cigarettes     Last attempt to quit: 2020     Years since quittin.0    Smokeless tobacco: Never    Tobacco comments:     Enrolled in the [employer] on 5/3/14 ([facility] Member ID # [id number]). Ambulatory referral to Smoking Cessation Program   Substance and Sexual Activity    Alcohol use: No     Alcohol/week: 0.0 standard drinks    Drug use: No    Sexual activity: Yes     Partners: Male     Review of Systems   Constitutional:  Negative for chills and fever.   HENT:  Negative for congestion, postnasal drip and rhinorrhea.    Eyes:  Negative for visual disturbance.   Respiratory:  Negative for chest tightness, shortness of breath and wheezing.    Cardiovascular:  Negative for chest pain.   Gastrointestinal:  Negative for abdominal pain, nausea and vomiting.   Endocrine: Negative for polyuria.   Genitourinary:  Negative for difficulty urinating.   Musculoskeletal:  Negative for arthralgias and myalgias.   Skin:  Positive for wound.   Neurological:  Negative for dizziness and weakness.   Hematological:  Does not bruise/bleed easily.   Psychiatric/Behavioral:  Negative for agitation.    Objective:     Vital Signs (Most Recent):  Temp: 97.8 °F (36.6 °C) (22 2356)  Pulse: 96  (12/22/22 2356)  Resp: 18 (12/22/22 2356)  BP: (!) 176/77 (12/22/22 2356)  SpO2: (!) 94 % (12/22/22 2356)   Vital Signs (24h Range):  Temp:  [97.8 °F (36.6 °C)-99.8 °F (37.7 °C)] 97.8 °F (36.6 °C)  Pulse:  [] 96  Resp:  [18-19] 18  SpO2:  [94 %-99 %] 94 %  BP: (114-176)/(58-77) 176/77     Weight: 106.6 kg (235 lb)  Body mass index is 37.93 kg/m².    Physical Exam  Constitutional:       Appearance: Normal appearance.   HENT:      Head: Normocephalic and atraumatic.      Nose: No congestion or rhinorrhea.      Mouth/Throat:      Mouth: Mucous membranes are moist.   Cardiovascular:      Rate and Rhythm: Normal rate.      Pulses: Normal pulses.   Pulmonary:      Effort: Pulmonary effort is normal.   Abdominal:      General: Bowel sounds are normal. There is no distension.      Palpations: Abdomen is soft.   Musculoskeletal:         General: Normal range of motion.      Cervical back: Normal range of motion and neck supple.   Skin:     General: Skin is warm and dry.      Comments: L foot plantar surface wound, erythema,  consistent with cellulitis  There is no foul smell there is no visible drainage  Left lower extremity swelling    Neurological:      Mental Status: She is alert and oriented to person, place, and time.   Psychiatric:         Mood and Affect: Mood is anxious.           Significant Labs: All pertinent labs within the past 24 hours have been reviewed.    Significant Imaging: I have reviewed all pertinent imaging results/findings within the past 24 hours.    Assessment/Plan:     * Open wound of left foot  NPO  Continue IV abx  Consult podiatry and ortho         Charcot's joint of left foot  -Podiatry and ortho consulted      PAD (peripheral artery disease)  - continue eliquis, aspirin, plavix  - arterial from 05/2022: Sonogram suggest hemodynamically significant stenosis in the left and DPA. Multifocal mild to moderate grade stenoses is suspected throughout the left MIRACLE and, bilateral posterior  tibial and peroneal arteries.      Anemia  Chronic, stable  Monitor labs      Thrombocytosis  chronic   - stable, appears near baseline   - continue ASA 81 Mg QD       Bipolar 1 disorder  Stable  - continue home meds: depakote, olanzapine, risperidone     History of pulmonary embolus (PE)  History of DVT (deep vein thrombosis)    -continue eliquis      Tobacco abuse  Counseled on smoking cessation for 5 minutes.    Hyperlipidemia  continue statin      COPD (chronic obstructive pulmonary disease)    Stable  duonebs prn     Essential hypertension  -Continue home meds lisinopril, metoprolol tartrate    Type II diabetes mellitus with neurological manifestations  Meds: SSI PRN to maintain goal 140-180  Resume home regimen at discharge  Hypoglycemia protocol PRN  Continue statin      VTE Risk Mitigation (From admission, onward)         Ordered     IP VTE HIGH RISK PATIENT  Once         12/22/22 2256     Place sequential compression device  Until discontinued         12/22/22 2256                   Nydia Hutchinson NP  Department of Hospital Medicine   [facility] - Med Surg

## 2022-12-23 NOTE — PLAN OF CARE
Patient goes from being calm, cooperative to tearful and anxious and frustrated about the possibility of not being able to see her family for the holiday; but mostly agreeable to care.  Needs constant redirection to situation and limitations to not go outside. Patient is anxious to see Podiatry with the hope of going home today. Bedside/hallway report given to oncoming nurse Vijaya; pt outside her room on the scooter saying she needed to go outside due to her gifts being in the care. Patient agreeable to go back in room to discuss. Vijaya to continue with plan of care  Problem: Adult Inpatient Plan of Care  Goal: Plan of Care Review  Outcome: Ongoing, Progressing  Goal: Optimal Comfort and Wellbeing  Outcome: Ongoing, Progressing  Goal: Readiness for Transition of Care  Outcome: Ongoing, Progressing     Problem: Impaired Wound Healing  Goal: Optimal Wound Healing  Outcome: Ongoing, Progressing     Problem: Diabetes Comorbidity  Goal: Blood Glucose Level Within Targeted Range  Outcome: Ongoing, Progressing

## 2022-12-23 NOTE — NURSING
Nurses Note -- 4 Eyes      12/23/2022   0030      Skin assessed during: Admit      [x] No Pressure Injuries Present    []Prevention Measures Documented      [x] Yes- Altered Skin Integrity Present or Discovered   [] LDA Added if Not in Epic (Describe Wound)   [] New Altered Skin Integrity was Present on Admit and Documented in LDA   [x] Wound Image Taken  12/22/22  [address] Pan-note prior to admit to [facility]  Wound Care Consulted? Yes  Left foot ulcer      Attending Nurse:  Nicki Molina RN     Second RN/Staff Member:  Fiorella Ordoñez RN

## 2022-12-23 NOTE — PROGRESS NOTES
Vancomycin consult follow-up:    Patient reviewed, renal function stable, no new levels, continue current therapy; Next levels due: trough due 12/24/2022 at 0730

## 2022-12-23 NOTE — ED NOTES
12/22/22 1830   Provider Notification   Reason for Communication Critical lab value  (Lactic 3.56)   Provider Name Dr. Correa   Provider Role Attending physician   Method of Communication Face to face

## 2022-12-24 ENCOUNTER — ANESTHESIA EVENT (OUTPATIENT)
Dept: SURGERY | Facility: HOSPITAL | Age: 52
DRG: 623 | End: 2022-12-24
Payer: MEDICAID

## 2022-12-24 ENCOUNTER — ANESTHESIA (OUTPATIENT)
Dept: SURGERY | Facility: HOSPITAL | Age: 52
DRG: 623 | End: 2022-12-24
Payer: MEDICAID

## 2022-12-24 LAB
ANION GAP SERPL CALC-SCNC: 9 MMOL/L (ref 8–16)
BASOPHILS # BLD AUTO: 0.08 K/UL (ref 0–0.2)
BASOPHILS NFR BLD: 0.7 % (ref 0–1.9)
BUN SERPL-MCNC: 11 MG/DL (ref 6–20)
CALCIUM SERPL-MCNC: 8.5 MG/DL (ref 8.7–10.5)
CHLORIDE SERPL-SCNC: 101 MMOL/L (ref 95–110)
CO2 SERPL-SCNC: 24 MMOL/L (ref 23–29)
CREAT SERPL-MCNC: 0.7 MG/DL (ref 0.5–1.4)
DIFFERENTIAL METHOD: ABNORMAL
EOSINOPHIL # BLD AUTO: 0.5 K/UL (ref 0–0.5)
EOSINOPHIL NFR BLD: 4.3 % (ref 0–8)
ERYTHROCYTE [DISTWIDTH] IN BLOOD BY AUTOMATED COUNT: 17 % (ref 11.5–14.5)
EST. GFR  (NO RACE VARIABLE): >60 ML/MIN/1.73 M^2
GLUCOSE SERPL-MCNC: 122 MG/DL (ref 70–110)
HCT VFR BLD AUTO: 29.4 % (ref 37–48.5)
HGB BLD-MCNC: 9.4 G/DL (ref 12–16)
IMM GRANULOCYTES # BLD AUTO: 0.04 K/UL (ref 0–0.04)
IMM GRANULOCYTES NFR BLD AUTO: 0.4 % (ref 0–0.5)
IRON SERPL-MCNC: 14 UG/DL (ref 30–160)
LYMPHOCYTES # BLD AUTO: 4.5 K/UL (ref 1–4.8)
LYMPHOCYTES NFR BLD: 39.5 % (ref 18–48)
MAGNESIUM SERPL-MCNC: 1.9 MG/DL (ref 1.6–2.6)
MCH RBC QN AUTO: 24.9 PG (ref 27–31)
MCHC RBC AUTO-ENTMCNC: 32 G/DL (ref 32–36)
MCV RBC AUTO: 78 FL (ref 82–98)
MONOCYTES # BLD AUTO: 1.5 K/UL (ref 0.3–1)
MONOCYTES NFR BLD: 13 % (ref 4–15)
NEUTROPHILS # BLD AUTO: 4.8 K/UL (ref 1.8–7.7)
NEUTROPHILS NFR BLD: 42.1 % (ref 38–73)
NRBC BLD-RTO: 0 /100 WBC
PLATELET # BLD AUTO: 647 K/UL (ref 150–450)
PMV BLD AUTO: 9.1 FL (ref 9.2–12.9)
POCT GLUCOSE: 126 MG/DL (ref 70–110)
POCT GLUCOSE: 173 MG/DL (ref 70–110)
POCT GLUCOSE: 212 MG/DL (ref 70–110)
POCT GLUCOSE: 98 MG/DL (ref 70–110)
POTASSIUM SERPL-SCNC: 4.4 MMOL/L (ref 3.5–5.1)
RBC # BLD AUTO: 3.78 M/UL (ref 4–5.4)
SATURATED IRON: 4 % (ref 20–50)
SODIUM SERPL-SCNC: 134 MMOL/L (ref 136–145)
TOTAL IRON BINDING CAPACITY: 385 UG/DL (ref 250–450)
TRANSFERRIN SERPL-MCNC: 260 MG/DL (ref 200–375)
VANCOMYCIN TROUGH SERPL-MCNC: 14.4 UG/ML (ref 10–22)
WBC # BLD AUTO: 11.35 K/UL (ref 3.9–12.7)

## 2022-12-24 PROCEDURE — 25000003 PHARM REV CODE 250: Performed by: STUDENT IN AN ORGANIZED HEALTH CARE EDUCATION/TRAINING PROGRAM

## 2022-12-24 PROCEDURE — 87206 SMEAR FLUORESCENT/ACID STAI: CPT | Performed by: PODIATRIST

## 2022-12-24 PROCEDURE — 88311 DECALCIFY TISSUE: CPT | Mod: 26,,, | Performed by: STUDENT IN AN ORGANIZED HEALTH CARE EDUCATION/TRAINING PROGRAM

## 2022-12-24 PROCEDURE — 84466 ASSAY OF TRANSFERRIN: CPT | Performed by: STUDENT IN AN ORGANIZED HEALTH CARE EDUCATION/TRAINING PROGRAM

## 2022-12-24 PROCEDURE — 88311 PR  DECALCIFY TISSUE: ICD-10-PCS | Mod: 26,,, | Performed by: STUDENT IN AN ORGANIZED HEALTH CARE EDUCATION/TRAINING PROGRAM

## 2022-12-24 PROCEDURE — 63600175 PHARM REV CODE 636 W HCPCS: Performed by: NURSE PRACTITIONER

## 2022-12-24 PROCEDURE — 94761 N-INVAS EAR/PLS OXIMETRY MLT: CPT

## 2022-12-24 PROCEDURE — 25000003 PHARM REV CODE 250

## 2022-12-24 PROCEDURE — S0073 INJECTION, AZTREONAM, 500 MG: HCPCS | Performed by: NURSE PRACTITIONER

## 2022-12-24 PROCEDURE — 36000704 HC OR TIME LEV I 1ST 15 MIN: Performed by: PODIATRIST

## 2022-12-24 PROCEDURE — 36415 COLL VENOUS BLD VENIPUNCTURE: CPT | Performed by: STUDENT IN AN ORGANIZED HEALTH CARE EDUCATION/TRAINING PROGRAM

## 2022-12-24 PROCEDURE — 36000705 HC OR TIME LEV I EA ADD 15 MIN: Performed by: PODIATRIST

## 2022-12-24 PROCEDURE — 25000003 PHARM REV CODE 250: Performed by: NURSE PRACTITIONER

## 2022-12-24 PROCEDURE — 87116 MYCOBACTERIA CULTURE: CPT | Performed by: PODIATRIST

## 2022-12-24 PROCEDURE — 96372 THER/PROPH/DIAG INJ SC/IM: CPT | Performed by: NURSE PRACTITIONER

## 2022-12-24 PROCEDURE — D9220A PRA ANESTHESIA: ICD-10-PCS | Mod: CRNA,,, | Performed by: STUDENT IN AN ORGANIZED HEALTH CARE EDUCATION/TRAINING PROGRAM

## 2022-12-24 PROCEDURE — G0378 HOSPITAL OBSERVATION PER HR: HCPCS

## 2022-12-24 PROCEDURE — D9220A PRA ANESTHESIA: ICD-10-PCS | Mod: ANES,,, | Performed by: ANESTHESIOLOGY

## 2022-12-24 PROCEDURE — 83735 ASSAY OF MAGNESIUM: CPT | Performed by: STUDENT IN AN ORGANIZED HEALTH CARE EDUCATION/TRAINING PROGRAM

## 2022-12-24 PROCEDURE — 63600175 PHARM REV CODE 636 W HCPCS: Performed by: STUDENT IN AN ORGANIZED HEALTH CARE EDUCATION/TRAINING PROGRAM

## 2022-12-24 PROCEDURE — 87070 CULTURE OTHR SPECIMN AEROBIC: CPT | Performed by: PODIATRIST

## 2022-12-24 PROCEDURE — 10061 PR DRAIN SKIN ABSCESS COMPLIC: ICD-10-PCS | Mod: ,,, | Performed by: PODIATRIST

## 2022-12-24 PROCEDURE — D9220A PRA ANESTHESIA: Mod: CRNA,,, | Performed by: STUDENT IN AN ORGANIZED HEALTH CARE EDUCATION/TRAINING PROGRAM

## 2022-12-24 PROCEDURE — 99214 OFFICE O/P EST MOD 30 MIN: CPT | Mod: ,,, | Performed by: STUDENT IN AN ORGANIZED HEALTH CARE EDUCATION/TRAINING PROGRAM

## 2022-12-24 PROCEDURE — 94640 AIRWAY INHALATION TREATMENT: CPT

## 2022-12-24 PROCEDURE — 25000003 PHARM REV CODE 250: Performed by: EMERGENCY MEDICINE

## 2022-12-24 PROCEDURE — 10061 I&D ABSCESS COMP/MULTIPLE: CPT | Mod: ,,, | Performed by: PODIATRIST

## 2022-12-24 PROCEDURE — 87075 CULTR BACTERIA EXCEPT BLOOD: CPT | Performed by: PODIATRIST

## 2022-12-24 PROCEDURE — S4991 NICOTINE PATCH NONLEGEND: HCPCS | Performed by: STUDENT IN AN ORGANIZED HEALTH CARE EDUCATION/TRAINING PROGRAM

## 2022-12-24 PROCEDURE — 88305 TISSUE EXAM BY PATHOLOGIST: ICD-10-PCS | Mod: 26,,, | Performed by: STUDENT IN AN ORGANIZED HEALTH CARE EDUCATION/TRAINING PROGRAM

## 2022-12-24 PROCEDURE — 87205 SMEAR GRAM STAIN: CPT | Performed by: PODIATRIST

## 2022-12-24 PROCEDURE — 85025 COMPLETE CBC W/AUTO DIFF WBC: CPT | Performed by: STUDENT IN AN ORGANIZED HEALTH CARE EDUCATION/TRAINING PROGRAM

## 2022-12-24 PROCEDURE — 88305 TISSUE EXAM BY PATHOLOGIST: CPT | Performed by: STUDENT IN AN ORGANIZED HEALTH CARE EDUCATION/TRAINING PROGRAM

## 2022-12-24 PROCEDURE — 87102 FUNGUS ISOLATION CULTURE: CPT | Performed by: PODIATRIST

## 2022-12-24 PROCEDURE — 80048 BASIC METABOLIC PNL TOTAL CA: CPT | Performed by: STUDENT IN AN ORGANIZED HEALTH CARE EDUCATION/TRAINING PROGRAM

## 2022-12-24 PROCEDURE — 37000009 HC ANESTHESIA EA ADD 15 MINS: Performed by: PODIATRIST

## 2022-12-24 PROCEDURE — 25000003 PHARM REV CODE 250: Performed by: PODIATRIST

## 2022-12-24 PROCEDURE — 20220 BONE BIOPSY TROCAR/NDL SUPFC: CPT | Mod: 51,,, | Performed by: PODIATRIST

## 2022-12-24 PROCEDURE — 88311 DECALCIFY TISSUE: CPT | Performed by: STUDENT IN AN ORGANIZED HEALTH CARE EDUCATION/TRAINING PROGRAM

## 2022-12-24 PROCEDURE — 20220 PR BONE BIOPSY,TROCAR/NEEDLE SUPERF: ICD-10-PCS | Mod: 51,,, | Performed by: PODIATRIST

## 2022-12-24 PROCEDURE — 94799 UNLISTED PULMONARY SVC/PX: CPT

## 2022-12-24 PROCEDURE — 80202 ASSAY OF VANCOMYCIN: CPT | Performed by: STUDENT IN AN ORGANIZED HEALTH CARE EDUCATION/TRAINING PROGRAM

## 2022-12-24 PROCEDURE — 99214 PR OFFICE/OUTPT VISIT, EST, LEVL IV, 30-39 MIN: ICD-10-PCS | Mod: ,,, | Performed by: STUDENT IN AN ORGANIZED HEALTH CARE EDUCATION/TRAINING PROGRAM

## 2022-12-24 PROCEDURE — 88305 TISSUE EXAM BY PATHOLOGIST: CPT | Mod: 26,,, | Performed by: STUDENT IN AN ORGANIZED HEALTH CARE EDUCATION/TRAINING PROGRAM

## 2022-12-24 PROCEDURE — 63600175 PHARM REV CODE 636 W HCPCS: Performed by: EMERGENCY MEDICINE

## 2022-12-24 PROCEDURE — D9220A PRA ANESTHESIA: Mod: ANES,,, | Performed by: ANESTHESIOLOGY

## 2022-12-24 PROCEDURE — 37000008 HC ANESTHESIA 1ST 15 MINUTES: Performed by: PODIATRIST

## 2022-12-24 PROCEDURE — 71000033 HC RECOVERY, INTIAL HOUR: Performed by: PODIATRIST

## 2022-12-24 PROCEDURE — 11000001 HC ACUTE MED/SURG PRIVATE ROOM

## 2022-12-24 RX ORDER — FENTANYL CITRATE 50 UG/ML
INJECTION, SOLUTION INTRAMUSCULAR; INTRAVENOUS
Status: DISCONTINUED | OUTPATIENT
Start: 2022-12-24 | End: 2022-12-24

## 2022-12-24 RX ORDER — LANOLIN ALCOHOL/MO/W.PET/CERES
400 CREAM (GRAM) TOPICAL ONCE
Status: COMPLETED | OUTPATIENT
Start: 2022-12-24 | End: 2022-12-24

## 2022-12-24 RX ORDER — SODIUM CHLORIDE 0.9 % (FLUSH) 0.9 %
10 SYRINGE (ML) INJECTION
Status: DISCONTINUED | OUTPATIENT
Start: 2022-12-24 | End: 2022-12-27

## 2022-12-24 RX ORDER — ONDANSETRON 2 MG/ML
INJECTION INTRAMUSCULAR; INTRAVENOUS
Status: DISCONTINUED | OUTPATIENT
Start: 2022-12-24 | End: 2022-12-24

## 2022-12-24 RX ORDER — ASPIRIN 81 MG/1
81 TABLET ORAL DAILY
Status: DISCONTINUED | OUTPATIENT
Start: 2022-12-25 | End: 2022-12-24

## 2022-12-24 RX ORDER — BUPIVACAINE HYDROCHLORIDE 5 MG/ML
INJECTION, SOLUTION PERINEURAL
Status: DISCONTINUED | OUTPATIENT
Start: 2022-12-24 | End: 2022-12-24 | Stop reason: HOSPADM

## 2022-12-24 RX ORDER — PROPOFOL 10 MG/ML
VIAL (ML) INTRAVENOUS
Status: DISCONTINUED | OUTPATIENT
Start: 2022-12-24 | End: 2022-12-24

## 2022-12-24 RX ORDER — LIDOCAINE HYDROCHLORIDE 20 MG/ML
INJECTION INTRAVENOUS
Status: DISCONTINUED | OUTPATIENT
Start: 2022-12-24 | End: 2022-12-24

## 2022-12-24 RX ORDER — MIDAZOLAM HYDROCHLORIDE 1 MG/ML
INJECTION, SOLUTION INTRAMUSCULAR; INTRAVENOUS
Status: DISCONTINUED | OUTPATIENT
Start: 2022-12-24 | End: 2022-12-24

## 2022-12-24 RX ORDER — SODIUM CHLORIDE 0.9 % (FLUSH) 0.9 %
10 SYRINGE (ML) INJECTION
Status: DISCONTINUED | OUTPATIENT
Start: 2022-12-24 | End: 2022-12-27 | Stop reason: HOSPADM

## 2022-12-24 RX ORDER — LIDOCAINE HYDROCHLORIDE 10 MG/ML
INJECTION INFILTRATION; PERINEURAL
Status: DISCONTINUED | OUTPATIENT
Start: 2022-12-24 | End: 2022-12-24 | Stop reason: HOSPADM

## 2022-12-24 RX ORDER — CEFEPIME HYDROCHLORIDE 1 G/50ML
2 INJECTION, SOLUTION INTRAVENOUS
Status: DISCONTINUED | OUTPATIENT
Start: 2022-12-24 | End: 2022-12-27 | Stop reason: HOSPADM

## 2022-12-24 RX ORDER — PROPOFOL 10 MG/ML
VIAL (ML) INTRAVENOUS CONTINUOUS PRN
Status: DISCONTINUED | OUTPATIENT
Start: 2022-12-24 | End: 2022-12-24

## 2022-12-24 RX ADMIN — PROPOFOL 100 MCG/KG/MIN: 10 INJECTION, EMULSION INTRAVENOUS at 08:12

## 2022-12-24 RX ADMIN — GABAPENTIN 600 MG: 300 CAPSULE ORAL at 09:12

## 2022-12-24 RX ADMIN — LISINOPRIL 10 MG: 5 TABLET ORAL at 09:12

## 2022-12-24 RX ADMIN — INSULIN ASPART 1 UNITS: 100 INJECTION, SOLUTION INTRAVENOUS; SUBCUTANEOUS at 09:12

## 2022-12-24 RX ADMIN — DIVALPROEX SODIUM 500 MG: 250 TABLET, DELAYED RELEASE ORAL at 10:12

## 2022-12-24 RX ADMIN — CLINDAMYCIN PHOSPHATE 900 MG: 900 INJECTION, SOLUTION INTRAVENOUS at 05:12

## 2022-12-24 RX ADMIN — ONDANSETRON 4 MG: 2 INJECTION, SOLUTION INTRAMUSCULAR; INTRAVENOUS at 08:12

## 2022-12-24 RX ADMIN — VANCOMYCIN HYDROCHLORIDE 1750 MG: 500 INJECTION, POWDER, LYOPHILIZED, FOR SOLUTION INTRAVENOUS at 11:12

## 2022-12-24 RX ADMIN — RISPERIDONE 3 MG: 1 TABLET ORAL at 09:12

## 2022-12-24 RX ADMIN — TRAMADOL HYDROCHLORIDE 50 MG: 50 TABLET, COATED ORAL at 04:12

## 2022-12-24 RX ADMIN — FENTANYL CITRATE 50 MCG: 50 INJECTION, SOLUTION INTRAMUSCULAR; INTRAVENOUS at 08:12

## 2022-12-24 RX ADMIN — RISPERIDONE 3 MG: 1 TABLET ORAL at 10:12

## 2022-12-24 RX ADMIN — FLUTICASONE FUROATE AND VILANTEROL TRIFENATATE 1 PUFF: 100; 25 POWDER RESPIRATORY (INHALATION) at 08:12

## 2022-12-24 RX ADMIN — LIDOCAINE HYDROCHLORIDE 40 MG: 20 INJECTION, SOLUTION INTRAVENOUS at 08:12

## 2022-12-24 RX ADMIN — SODIUM CHLORIDE, SODIUM LACTATE, POTASSIUM CHLORIDE, AND CALCIUM CHLORIDE: .6; .31; .03; .02 INJECTION, SOLUTION INTRAVENOUS at 08:12

## 2022-12-24 RX ADMIN — GABAPENTIN 600 MG: 300 CAPSULE ORAL at 10:12

## 2022-12-24 RX ADMIN — VANCOMYCIN HYDROCHLORIDE 1750 MG: 500 INJECTION, POWDER, LYOPHILIZED, FOR SOLUTION INTRAVENOUS at 10:12

## 2022-12-24 RX ADMIN — METOPROLOL TARTRATE 50 MG: 50 TABLET, FILM COATED ORAL at 10:12

## 2022-12-24 RX ADMIN — PROPOFOL 40 MG: 10 INJECTION, EMULSION INTRAVENOUS at 08:12

## 2022-12-24 RX ADMIN — LIDOCAINE 1 PATCH: 50 PATCH TOPICAL at 06:12

## 2022-12-24 RX ADMIN — MAGNESIUM OXIDE TAB 400 MG (241.3 MG ELEMENTAL MG) 400 MG: 400 (241.3 MG) TAB at 10:12

## 2022-12-24 RX ADMIN — PRAVASTATIN SODIUM 40 MG: 40 TABLET ORAL at 09:12

## 2022-12-24 RX ADMIN — DIVALPROEX SODIUM 1250 MG: 250 TABLET, DELAYED RELEASE ORAL at 09:12

## 2022-12-24 RX ADMIN — AZTREONAM 1000 MG: 1 INJECTION, POWDER, LYOPHILIZED, FOR SOLUTION INTRAMUSCULAR; INTRAVENOUS at 06:12

## 2022-12-24 RX ADMIN — CEFEPIME 2 G: 2 INJECTION, POWDER, FOR SOLUTION INTRAVENOUS at 09:12

## 2022-12-24 RX ADMIN — METOPROLOL TARTRATE 50 MG: 50 TABLET, FILM COATED ORAL at 09:12

## 2022-12-24 RX ADMIN — CEFEPIME 2 G: 2 INJECTION, POWDER, FOR SOLUTION INTRAVENOUS at 01:12

## 2022-12-24 RX ADMIN — ACETAMINOPHEN 650 MG: 325 TABLET ORAL at 06:12

## 2022-12-24 RX ADMIN — Medication 1 PATCH: at 10:12

## 2022-12-24 RX ADMIN — MIDAZOLAM HYDROCHLORIDE 2 MG: 1 INJECTION, SOLUTION INTRAMUSCULAR; INTRAVENOUS at 08:12

## 2022-12-24 RX ADMIN — TRAMADOL HYDROCHLORIDE 50 MG: 50 TABLET, COATED ORAL at 05:12

## 2022-12-24 NOTE — ASSESSMENT & PLAN NOTE
-chronic   - stable, appears near baseline   - Resume ASA 81 Mg QD tomorrow after monitoring for post op bleeding

## 2022-12-24 NOTE — TRANSFER OF CARE
"Anesthesia Transfer of Care Note    Patient: Audrey Natarajan    Procedure(s) Performed: Procedure(s) (LRB):  INCISION AND DRAINAGE, FOOT (Left)    Patient location: PACU    Anesthesia Type: general    Transport from OR: Transported from OR on room air with adequate spontaneous ventilation    Post pain: adequate analgesia    Post assessment: no apparent anesthetic complications and tolerated procedure well    Post vital signs: stable    Level of consciousness: alert, awake and oriented    Nausea/Vomiting: no nausea/vomiting    Complications: none    Transfer of care protocol was followed      Last vitals:   Visit Vitals  BP (!) 120/58 (BP Location: Left arm, Patient Position: Lying)   Pulse 82   Temp 36.6 °C (97.8 °F) (Oral)   Resp 15   Ht 5' 6" (1.676 m)   Wt 106.6 kg (235 lb)   LMP 11/01/2011 (LMP Unknown)   SpO2 97%   Breastfeeding No   BMI 37.93 kg/m²     "

## 2022-12-24 NOTE — SUBJECTIVE & OBJECTIVE
"Past Medical History:   Diagnosis Date    ADHD (attention deficit hyperactivity disorder)     Arthritis     Asthma     Bipolar 1 disorder     Cataract     Cigarette smoker     COPD (chronic obstructive pulmonary disease)     Coronary artery disease     A fib    Depression     bipolar manic depresson    Diabetes mellitus     Diabetic foot ulcers     Diabetic neuropathy     DVT of lower extremity, bilateral 07/2013    bilateral LE DVT. [place] filter placed.     Encounter for blood transfusion     History of blood clots 1. Left Leg=2003; 2.Bilateral Groin=Blood Clots= 5 or 6/ 2013 & 7/2013; 3. LLL of Lung=7/2013;  4. Lt. Lower Leg=7/2013.     Pt. had 1st Blood Clot after Ykhpscabxbel=7294, & Last=2013. [place] Filter= Rt.Lateral Neck.    HTN (hypertension) 06/06/2013    Pt states that she does not have hypertension    Hypercholesteremia     Irregular heartbeat     Neuromuscular disorder     neuropathy feet    Obese     PE (pulmonary embolism) 07/2013    bilat LE DVT.     Restless leg syndrome        Past Surgical History:   Procedure Laterality Date    ABDOMINAL SURGERY  2010    gastric sleeve    BILATERAL OOPHORECTOMY Bilateral 1/12/2015    CHOLECYSTECTOMY      DEBRIDEMENT OF FOOT Bilateral 5/10/2022    Procedure: DEBRIDEMENT, FOOT;  Surgeon: Maira De Los Santos DPM;  Location: [facility] OR;  Service: Podiatry;  Laterality: Bilateral;    Green' s filter Right 7/4/2012    Right Neck & Tunneled Down.    HERNIA REPAIR      "[place] of Hernias Repaires around th [place].", pt. states    LAPAROSCOPIC CHOLECYSTECTOMY N/A 9/10/2020    Procedure: CHOLECYSTECTOMY, LAPAROSCOPIC;  Surgeon: Montrell Gutierrez MD;  Location: [facility] OR;  Service: General;  Laterality: N/A;  RN PREOP 9/9----COVID Negative  9/9    OVARIAN CYST REMOVAL  3/13/2014    AR REMOVAL OF OVARY/TUBE(S)      SPLENECTOMY, TOTAL  July 2003    TONSILLECTOMY      as a child    TYMPANOSTOMY TUBE PLACEMENT  1976    VEIN SURGERY  2003    Lt leg       Review of patient's " allergies indicates:   Allergen Reactions    Morphine Other (See Comments)     Patient had a psychotic episode after taking Morphine  Agitation, hallucinations    Penicillins Anaphylaxis     itching    Januvia [sitagliptin] Hives    Carbamazepine Other (See Comments)     hyponatremia       Medications:  Medications Prior to Admission   Medication Sig    apixaban (ELIQUIS) 5 mg Tab Take 1 tablet (5 mg total) by mouth 2 (two) times daily.    aspirin 81 MG Chew Take 1 tablet (81 mg total) by mouth once daily.    bumetanide (BUMEX) 1 MG tablet Take 1 tablet (1 mg total) by mouth once daily.    DUPIXENT  mg/2 mL PnIj Inject into the skin.    hydrOXYzine (ATARAX) 50 MG tablet Take 50 mg by mouth 4 (four) times daily as needed.    lisinopriL 10 MG tablet Take 1 tablet (10 mg total) by mouth once daily.    meloxicam (MOBIC) 15 MG tablet Take 1 tablet (15 mg total) by mouth once daily.    metFORMIN (GLUCOPHAGE) 1000 MG tablet Take 1 tablet (1,000 mg total) by mouth 2 (two) times daily with meals.    methocarbamoL (ROBAXIN) 500 MG Tab Take 500 mg by mouth. Frequency could not be confirmed.    metoprolol tartrate (LOPRESSOR) 50 MG tablet Take 1 tablet (50 mg total) by mouth 2 (two) times daily.    pravastatin (PRAVACHOL) 40 MG tablet Take 1 tablet (40 mg total) by mouth every evening.    semaglutide (OZEMPIC) 1 mg/dose (4 mg/3 mL) Inject 1 mg into the skin every 7 days.    VYVANSE 40 mg Cap Take 40 mg by mouth once daily.    acetaminophen (TYLENOL) 500 MG tablet Take 2 tablets (1,000 mg total) by mouth every 6 (six) hours as needed for Pain.    albuterol (PROVENTIL/VENTOLIN HFA) 90 mcg/actuation inhaler INHALE 2 PUFFS INTO THE LUNGS EVERY 6 HOURS AS NEEDED FOR WHEEZING. RESCUE    albuterol-ipratropium (DUO-NEB) 2.5 mg-0.5 mg/3 mL nebulizer solution Take 3 mLs by nebulization every 6 (six) hours as needed for Wheezing or Shortness of Breath. Rescue    ammonium lactate (LAC-HYDRIN) 12 % lotion APPL Y ONCE TOPICALLY TWICE  DAILY FOR 30 DAYS    BINAXNOW COVID-19 AG SELF TEST Kit TEST AS DIRECTED TODAY    carBAMazepine (TEGRETOL) 200 mg tablet Take 400 mg by mouth 2 (two) times daily.    ceFEPIme (MAXIPIME) 1 gram injection EMPTY CONTENTS OF 1 VIAL INTO SHAKER. SPRINKLE DIRECTLY ONTO INFECTION SITE. PERFORM ONCE DAILY.    cetirizine (ZYRTEC) 10 MG tablet Take 1 tablet (10 mg total) by mouth once daily. for 10 days    divalproex (DEPAKOTE) 250 MG EC tablet Take 5 tablets (1,250 mg total) by mouth every evening.    divalproex (DEPAKOTE) 500 MG TbEC Take 1 tablet (500 mg total) by mouth once daily. PO QAM    fluticasone propionate (FLONASE) 50 mcg/actuation nasal spray 2 sprays (100 mcg total) by Each Nostril route daily as needed (Nasal congestion).    fluticasone-salmeterol diskus inhaler 250-50 mcg Inhale 1 puff into the lungs 2 (two) times daily. Controller    gabapentin (NEURONTIN) 300 MG capsule TAKE 2 CAPSULES(600 MG) BY MOUTH TWICE DAILY    HYDROcodone-acetaminophen (NORCO)  mg per tablet Take 1 tablet by mouth every 6 (six) hours as needed for Pain.    levoFLOXacin (LEVAQUIN) 750 MG tablet Take 1 tablet (750 mg total) by mouth once daily.    loratadine (CLARITIN) 10 mg tablet Take 1 tablet (10 mg total) by mouth once daily.    multivitamin Tab Take 1 tablet by mouth once daily.    nystatin (NYSTOP) powder APPLY TO ABDOMINAL AND BREAST SKIN FOLD TWICE DAILY.    pantoprazole (PROTONIX) 40 MG tablet Take 1 tablet (40 mg total) by mouth once daily.    potassium chloride (MICRO-K) 10 MEQ CpSR Take 1 capsule (10 mEq total) by mouth once daily.    risperiDONE (RISPERDAL M-TABS) 3 MG disintegrating tablet Take 1 tablet (3 mg total) by mouth 2 (two) times daily.    triamcinolone acetonide 0.025% (KENALOG) 0.025 % cream Apply topically 2 (two) times daily.     Antibiotics (From admission, onward)      Start     Stop Route Frequency Ordered    12/23/22 1215  clindamycin in D5W 900 mg/50 mL IVPB 900 mg         -- IV Every 8 hours  (non-standard times) 22 1108    22 0830  vancomycin (VANCOCIN) 1,750 mg in dextrose 5 % 500 mL IVPB         -- IV Every 12 hours (non-standard times) 22 2040    22 0700  aztreonam 1 g in dextrose 5 % 50 mL IVPB (ready to mix system)         -- IV Every 12 hours (non-standard times) 22 2234    22 1909  vancomycin - pharmacy to dose  (vancomycin IVPB)        See Hyperspace for full Linked Orders Report.    -- IV pharmacy to manage frequency 22 1809          Antifungals (From admission, onward)      None          Antivirals (From admission, onward)      None             Immunization History   Administered Date(s) Administered    Hepatitis B, Adult 2014, 2014    Influenza 2015    Influenza - Quadrivalent 2015    Influenza - Quadrivalent - PF *Preferred* (6 months and older) 2016, 10/24/2017, 10/31/2018, 2019, 2020, 2022, 2022    Influenza - Trivalent - PF (ADULT) 2015    Pneumococcal Polysaccharide - 23 Valent 10/24/2017    Tdap 2014       Family History       Problem Relation (Age of Onset)    Cataracts Father    Diabetes Father, Paternal Grandfather    Heart disease Father, Paternal Grandfather    Hypertension Father    No Known Problems Mother, Sister, Brother, Maternal Aunt, Maternal Uncle, Paternal Aunt, Paternal Uncle, Maternal Grandfather    Ovarian cancer Maternal Grandmother, Paternal Grandmother          Social History     Socioeconomic History    Marital status: Significant Other   Tobacco Use    Smoking status: Every Day     Packs/day: 1.00     Years: 37.00     Pack years: 37.00     Types: Cigarettes     Last attempt to quit: 2020     Years since quittin.0    Smokeless tobacco: Never    Tobacco comments:     Enrolled in the [employer] on 5/3/14 ([facility] Member ID # [id number]). Ambulatory referral to Smoking Cessation Program   Substance and Sexual Activity    Alcohol use: No     Alcohol/week:  0.0 standard drinks    Drug use: No    Sexual activity: Yes     Partners: Male   Social History Narrative     from her  of 20 years    Lives by herself since 2019     Social Determinants of Health     Financial Resource Strain: Unknown    Difficulty of Paying Living Expenses: Patient refused   Food Insecurity: Unknown    Worried About Running Out of Food in the Last Year: Patient refused    Ran Out of Food in the Last Year: Patient refused   Transportation Needs: Unknown    Lack of Transportation (Medical): Patient refused    Lack of Transportation (Non-Medical): Patient refused   Physical Activity: Unknown    Days of Exercise per Week: Patient refused    Minutes of Exercise per Session: Patient refused   Stress: Unknown    Feeling of Stress : Patient refused   Social Connections: Unknown    Frequency of Communication with Friends and Family: Patient refused    Frequency of Social Gatherings with Friends and Family: Patient refused    Attends Buddhist Services: Patient refused    Active Member of Clubs or Organizations: Patient refused    Attends Club or Organization Meetings: Patient refused    Marital Status: Patient refused   Housing Stability: Unknown    Unable to Pay for Housing in the Last Year: Patient refused    Unstable Housing in the Last Year: Patient refused     Review of Systems   Constitutional:  Negative for chills and fever.   Gastrointestinal:  Positive for constipation.   Skin:  Positive for rash and wound.   All other systems reviewed and are negative.  Objective:     Vital Signs (Most Recent):  Temp: 97.8 °F (36.6 °C) (12/24/22 0952)  Pulse: 78 (12/24/22 1020)  Resp: 15 (12/24/22 1020)  BP: 134/67 (12/24/22 1020)  SpO2: (!) 94 % (12/24/22 1020)   Vital Signs (24h Range):  Temp:  [97.6 °F (36.4 °C)-98.4 °F (36.9 °C)] 97.8 °F (36.6 °C)  Pulse:  [76-85] 78  Resp:  [14-20] 15  SpO2:  [93 %-100 %] 94 %  BP: (100-134)/(50-67) 134/67     Weight: 106.6 kg (235 lb)  Body mass index is  37.93 kg/m².    Estimated Creatinine Clearance: 118.7 mL/min (based on SCr of 0.7 mg/dL).    Physical Exam  Constitutional:       General: She is not in acute distress.     Appearance: She is not ill-appearing or toxic-appearing.   HENT:      Head: Normocephalic and atraumatic.      Right Ear: External ear normal.      Left Ear: External ear normal.      Mouth/Throat:      Mouth: Mucous membranes are moist.      Comments: Poor dentition    Eyes:      General: No scleral icterus.        Right eye: No discharge.         Left eye: No discharge.   Cardiovascular:      Rate and Rhythm: Normal rate and regular rhythm.   Pulmonary:      Effort: Pulmonary effort is normal. No respiratory distress.      Breath sounds: No stridor. No wheezing or rhonchi.   Abdominal:      General: There is no distension.      Palpations: Abdomen is soft.      Tenderness: There is no abdominal tenderness. There is no guarding.   Musculoskeletal:      Right lower leg: No edema.      Left lower leg: No edema.   Skin:     General: Skin is warm and dry.      Coloration: Skin is not jaundiced.      Findings: Erythema (LLE erythema - improving per pt) present. No bruising.      Comments: L foot wrapped   Neurological:      Mental Status: She is alert and oriented to person, place, and time. Mental status is at baseline.      Motor: No weakness.   Psychiatric:         Mood and Affect: Mood normal.         Behavior: Behavior normal.       Significant Labs:   Microbiology Results (last 7 days)       Procedure Component Value Units Date/Time    Fungus culture [[id number]] Collected: 12/24/22 0933    Order Status: Sent Specimen: Bone from Foot, Left Updated: 12/24/22 0933    AFB Culture & Smear [[id number]] Collected: 12/24/22 0933    Order Status: Sent Specimen: Bone from Foot, Left Updated: 12/24/22 0933    Culture, Anaerobe [[id number]] Collected: 12/24/22 0933    Order Status: Sent Specimen: Bone from Foot, Left Updated: 12/24/22 0933    Aerobic culture  [[id number]] Collected: 12/24/22 0933    Order Status: Sent Specimen: Bone from Foot, Left Updated: 12/24/22 0933    Gram stain [[id number]] Collected: 12/24/22 0933    Order Status: Sent Specimen: Bone from Foot, Left Updated: 12/24/22 0933    Blood culture x two cultures. Draw prior to antibiotics. [[id number]] Collected: 12/22/22 1751    Order Status: Completed Specimen: Blood from Peripheral, Antecubital, Left Updated: 12/23/22 1903     Blood Culture, Routine No Growth to date      No Growth to date    Narrative:      Aerobic and anaerobic    Blood culture x two cultures. Draw prior to antibiotics. [[id number]] Collected: 12/22/22 1743    Order Status: Completed Specimen: Blood from Peripheral, Forearm, Left Updated: 12/23/22 1903     Blood Culture, Routine No Growth to date      No Growth to date    Narrative:      Aerobic and anaerobic    Gram stain [[id number]] Collected: 12/23/22 1115    Order Status: Completed Specimen: Wound from Foot, Left Updated: 12/23/22 1316     Gram Stain Result No organisms seen      No WBC's    Aerobic culture [[id number]] Collected: 12/23/22 1115    Order Status: Sent Specimen: Wound from Foot, Left Updated: 12/23/22 1158    Culture, Anaerobe [[id number]] Collected: 12/23/22 1115    Order Status: Sent Specimen: Wound from Foot, Left Updated: 12/23/22 1158            Significant Imaging: I have reviewed all pertinent imaging results/findings within the past 24 hours.

## 2022-12-24 NOTE — BRIEF OP NOTE
[facility] Surg  Brief Operative Note    SUMMARY     Surgery Date: 12/24/2022     Surgeon(s) and Role:     * Fahad Razo DPM - Primary    Assisting Surgeon: Ariel Szymanski DPM PGY 2    Pre-op Diagnosis:  Diabetic ulcer of left midfoot associated with type 2 diabetes mellitus, limited to breakdown of skin [E11.621, L97.421]  Cellulitis and abscess of foot, except toes [L03.119, L02.619]    Post-op Diagnosis:  Post-Op Diagnosis Codes:     * Diabetic ulcer of left midfoot associated with type 2 diabetes mellitus, limited to breakdown of skin [E11.621, L97.421]     * Cellulitis and abscess of foot, except toes [L03.119, L02.619]    Procedure(s) (LRB):  INCISION AND DRAINAGE, FOOT (Left)    Anesthesia: Local MAC    Operative Findings: No seamus purulence, no drainable deep Abcess, extensive phlegmon, excess serous and joint appearing fluid. Incision made overlying wound, non viable portions of phlegmatic tissue debrided. Primary closure of distal and proximal aspect, central aspect packed with iodoform to allow for drainage. Deep wound culture obtained. Bone biopsy of medial/intermediate cuneiform sent for micro (C&S) and path (H&S).     Estimated Blood Loss: 5 mL    Estimated Blood Loss has been documented.         Specimens:   Specimen (24h ago, onward)       Start     Ordered    12/24/22 0923  Specimen to Pathology, Surgery Other (podiatry)  Once        Comments: Pre-op Diagnosis: Diabetic ulcer of left midfoot associated with type 2 diabetes mellitus, limited to breakdown of skin [E11.621, L97.421]Cellulitis and abscess of foot, except toes [L03.119, L02.619]Procedure(s):INCISION AND DRAINAGE, FOOT Number of specimens: 1Name of specimens: Left Mid Foot Bone     References:    Click here for ordering Quick Tip   Question Answer Comment   Procedure Type: Other podiatry   Specimen Class: Routine/Screening    Which provider would you like to cc? FAHAD RAZO    Release to patient Immediate        12/24/22 7214                     [id number]

## 2022-12-24 NOTE — HOSPITAL COURSE
52 y.o. female with PMH of DM2, Charcot's foot, osteomyelitis of the foot, diabetic foot ulcer, b/l LE DVTs (on Eliquis), afib on eliquis and has a nelly filter, MRSA, PAD, h/o PE, COPD, HTN, Bipolar I, tobacco abuse admitted to observation on 12/22/2022 for LLE wound and cellulitis. Was sent here from wound care clinic for further evaluation. XR left foot with Large plantar ulceration.  No acute fracture. Patient was started on broad spectrum antibiotics. Of note, patient was on oral levaquin for one week prior to admission. MRI left foot with Large infectious/inflammatory phlegmon at the plantar aspect of the midfoot with multiple small interconnecting abscesses Podiatry consulted- s/p incision and drainage on 12/24. No seamus purulence, no drainable deep Abcess, but noted extensive phlegmon.   Blood cx with NGTD. Pathology pending. Wound culture with MRSA. ID consulted- recommends transition to PO bactrim to complete 14d total from OR for SSTI, lashonda 1/6, pt reported tolerated bactrim in the past.     Patient admits feeling better overall.  Pain is well controlled at this time.  Wants to go home. Pt denies any fever, headaches, vision changes, chest pain, shortness of breath, palpitations, abdominal pain, nausea, vomiting, or any new weaknesses. Feels ready to go home. Patient's exam on discharge was as follow: Patient is alert and oriented, appears in no acute distress, heart with regular rate and rhythm, lungs clear to asculation with non-labored breathing, abdomen soft, and no new weaknesses or focal deficits seen. LLE erythema - improving per patient. No bruising. L foot wrapped . Left charcot foot.     Patient was counseled regarding any abnormal labs, differential diagnosis, treatment options, risk-benefit, lifestyle changes, prognosis, current condition, and medications. Patient was interactive and attentive.  Patient's questions were answered in a respectful and timely manner. Patient was instructed to  follow-up with PCP within 1 week and to continue taking medications as prescribed.  Instructed to also follow up with wound care and podiatry outpatient to follow up on pathology results. Also, extensively discussed the risks, benefits, and side effects of patient's medications. Discussed with patient about any medication changes. Patient verbalized understanding and agrees to treatment plan.  Patient is stable for discharge.  Patient has no other questions or concerns at this time.  ED precautions discussed with the patient.    Vital signs are stable. Ambulating without any difficulty. Tolerating p.o. intake without any nausea or vomiting. Afebrile for over 24 hours. Patient is in stable condition and has no questions or concerns. Patient will be discharge to home with home health for wound care once transportation is secured. Prescriptions sent to pharmacy.  CM/MELVI to assist with discharge planning.

## 2022-12-24 NOTE — NURSING
Pt off the unit going to surgery by bed via transport. IV access in place, saline locked. NAD or pain noted.

## 2022-12-24 NOTE — NURSING
Pt back to unit from surgery by bed via surgery team member. IV access in place, saline locked. NAD or pain noted. VSS. Left foot wrapped in case padding and Ace. No drainage noted. Will continue to monitor.      [facility] [place]es [place] -- 4 Eyes      12/24/2022       Skin assessed on: Saturday      [x] No Pressure Injuries Present    []Prevention Measures Documented    [] Yes LDA  for Pressure Injury Previously documented     [] Yes New Pressure Injury Discovered   [] LDA for New Pressure Injury Added      Attending RN:  Bessy Garduno RN     Second RN:  Luci Nguyen, RN

## 2022-12-24 NOTE — SUBJECTIVE & OBJECTIVE
Interval History:  No acute overnight events.  Patient remained afebrile.  Pain is currently controlled.  Patient denies any bleeding or further drainage from left foot wound.  Agreeable to procedure today    Review of Systems   Constitutional:  Negative for fatigue and fever.   Cardiovascular:  Positive for leg swelling (left foot swelling). Negative for chest pain.   Gastrointestinal:  Negative for abdominal pain, nausea and vomiting.   Musculoskeletal:  Positive for arthralgias and joint swelling.   Skin:  Positive for rash and wound.   Neurological:  Negative for weakness.     Objective:     Vital Signs (Most Recent):  Temp: 98.4 °F (36.9 °C) (12/24/22 1048)  Pulse: 77 (12/24/22 1048)  Resp: 18 (12/24/22 1048)  BP: (!) 144/67 (12/24/22 1048)  SpO2: 96 % (12/24/22 1048)   Vital Signs (24h Range):  Temp:  [97.6 °F (36.4 °C)-98.4 °F (36.9 °C)] 98.4 °F (36.9 °C)  Pulse:  [76-85] 77  Resp:  [14-20] 18  SpO2:  [93 %-100 %] 96 %  BP: (100-144)/(50-67) 144/67     Weight: 106.6 kg (235 lb)  Body mass index is 37.93 kg/m².    Intake/Output Summary (Last 24 hours) at 12/24/2022 1123  Last data filed at 12/24/2022 0935  Gross per 24 hour   Intake 906.89 ml   Output --   Net 906.89 ml      Physical Exam  Vitals and nursing note reviewed.   Constitutional:       General: She is not in acute distress.     Appearance: She is obese. She is not ill-appearing.   HENT:      Mouth/Throat:      Mouth: Mucous membranes are dry.   Eyes:      Extraocular Movements: Extraocular movements intact.   Cardiovascular:      Rate and Rhythm: Normal rate and regular rhythm.      Heart sounds: No murmur heard.    No gallop.   Pulmonary:      Effort: Pulmonary effort is normal. No respiratory distress.      Breath sounds: Normal breath sounds. No wheezing.   Musculoskeletal:         General: Swelling and deformity (left charcot foot) present. No tenderness.      Comments: Left foot wrapped    Skin:     General: Skin is warm and dry.      Findings:  Erythema present.      Comments: LLE erythema - improving per patient. No bruising. L foot wrapped    Neurological:      General: No focal deficit present.      Mental Status: She is alert and oriented to person, place, and time.   Psychiatric:         Mood and Affect: Mood normal.         Thought Content: Thought content normal.       Significant Labs: All pertinent labs within the past 24 hours have been reviewed.    Significant Imaging: I have reviewed all pertinent imaging results/findings within the past 24 hours.

## 2022-12-24 NOTE — HPI
53 yo female with bipolar d/o, prior splenectomy, DM c/b peripheral neuropathy and prior polymicrobial diabetic foot ulcer, prior mrsa bacteremia, tobacco use, and CAD admitted for wound infection. ID consulted for abx recs. Pt reported several month history of  L wound present with associated drainage and pain that worsened the past couple of weeks. Work up notable for MRI with advanced charcot arthropathy with phlegmon present - s/p OR 12/24, OR cx/path in process. Blcx no growth to date. Pt is currently on vancomycin and aztreonam. Reported anaphylaxis with PCN, though has tolerated keflex/ancef/cefepime in the past. Previously on levofloxacin prior to admission and had wound cx obtained 9/7 with strep/proteus for which pt tolerated cefadroxil that was prescribed. Denies toxic habits, reports tobacco use. Lives in a [facility] trailer, no pets or travel.

## 2022-12-24 NOTE — ASSESSMENT & PLAN NOTE
A1c:   Lab Results   Component Value Date    HGBA1C 7.1 (H) 12/22/2022     Meds:  SSI PRN to maintain goal 140-180  ADA diet, accuchecks ACHS, hypoglycemic protocol

## 2022-12-24 NOTE — OP NOTE
[facility] Surg  Operative Note      Date of Procedure: 12/24/2022     Procedure: Procedure(s) (LRB):  INCISION AND DRAINAGE, FOOT (Left)     Surgeon(s) and Role:     * Fahad Razo DPM - Primary     * Ariel Szymanski DPM PGY 2- Assisting Surgeon    Pre-Operative Diagnosis: Diabetic ulcer of left midfoot associated with type 2 diabetes mellitus, limited to breakdown of skin [E11.621, L97.421]  Cellulitis and abscess of foot, except toes [L03.119, L02.619]    Post-Operative Diagnosis: Post-Op Diagnosis Codes:     * Diabetic ulcer of left midfoot associated with type 2 diabetes mellitus, limited to breakdown of skin [E11.621, L97.421]     * Cellulitis and abscess of foot, except toes [L03.119, L02.619]    Anesthesia: Local MAC    Operative Findings (including complications, if any): No abscess or purulence, extensive amount of joint fluid and serous drainage, wound with central track probing to bone consisting of large phlegmon like mass of tissue without any other findings of infection. Wound probed 3 inches distally at 11 o'clock and proximally about 4 inches at 7 o'clock.     Description of Technical Procedures:     The patient was brought to the operating room on a stretcher and was transferred to the OR table in supine position. Anesthesia was induced.   20 mL of a 1:1 mixture of 0.50% bupivacaine plain and 1% lidocaine plain was locally infiltrated. The left lower limb was prepped and draped in a sterile manner. Distal esmark left on proximal foot acting as tourniquet.      Attention was directed to the left plantar foot. Using a 15 blade, an incision was made vertically over the plantar ulceration extending 2 cm distal and proximal to the wound. The wound base appeared thickened and gelatinous, without healthy appearing granulation. The portion of wound base adjacent to the central track mentioned in operative findings appeared fibrotic, non viable, and impeding to formation of healthy tissue. Jose Antonio  was used to debride this tissue back to the level of healthier bleeding tissue. Wound was probed with freer along its tracks assessing for any collections of fluid to be drained. After through inspection, no abscess was identified. Moderate drainage that appeared consist with a combination of serous and joint space fluid was noted. No other signs consistent with infection were encountered and only healthy viable appearing soft tissues remained. Pulsavac was used to copiously irrigate surgical wound. Deep tissue culture was obtained. Using a Jamshidi needle in a standard fashion bone from the medial midfoot was obtained and sent for path and micro. 3-0 nylon retention sutures were placed at the most distal and proximal aspect of the incision site to re approximate skin edges. Central wound base deficit was packed with iodoform.     The surgical site was irrigated with 3 L  saline solution via pulse lavage.  The surgical site was dressed with iodoform packing, 4x4 gauze, kerlix, and ACE wrap. Tourniquet(s) not used during procedure.     The patient was transferred to the recovery room with vital signs stable. Following a period of post op monitoring, the patient will be transferred to the floor with the following postop instructions:   1. Keep dressing clean, dry, and intact until next seen by podiatry.   2. Weightbearing status: nonweightbearing.   3. All necessary prescriptions were ordered and medical management will continue.   4. Contact podiatry with any postop questions or concerns.       Estimated Blood Loss (EBL): 5 mL            Implants: * No implants in log *    Specimens:   Specimen (24h ago, onward)       Start     Ordered    12/24/22 4664  Specimen to Pathology, Surgery Other (podiatry)  Once        Comments: Pre-op Diagnosis: Diabetic ulcer of left midfoot associated with type 2 diabetes mellitus, limited to breakdown of skin [E11.621, L97.421]Cellulitis and abscess of foot, except toes [L03.119,  L02.619]Procedure(s):INCISION AND DRAINAGE, FOOT Number of specimens: 1Name of specimens: Left Mid Foot Bone     References:    Click here for ordering Quick Tip   Question Answer Comment   Procedure Type: Other podiatry   Specimen Class: Routine/Screening    Which provider would you like to cc? SAFIA LAW    Release to patient Immediate        12/24/22 0933                            Condition: Good    Disposition: PACU - hemodynamically stable.    Attestation: I was present and scrubbed for the entire procedure.

## 2022-12-24 NOTE — CONSULTS
[facility] - Med Surg  Infectious Disease  Consult Note    Patient Name: Audrey Natarajan  MRN: [id number]  Admission Date: 12/22/2022  Hospital Length of Stay: 0 days  Attending Physician: Velvet Galicia DO  Primary Care Provider: Donaldo Pena MD     Isolation Status: No active isolations    Patient information was obtained from patient, relative(s), past medical records, ER records and primary team.      Inpatient consult to Infectious Diseases  Consult performed by: Keyona Hilario MD  Consult ordered by: Velvet Galicia DO        Assessment/Plan:     Charcot's joint of left foot  53 yo female with bipolar d/o, prior splenectomy, DM c/b peripheral neuropathy and prior polymicrobial diabetic foot ulcer, prior mrsa bacteremia, tobacco use, and CAD admitted for wound infection.. Work up notable for MRI with advanced charcot arthropathy with phlegmon present and possible OM - s/p OR 12/24, OR cx/path in process.     Recommendations:  -stop clinda and aztreonam  -start empiric cefepime - tolerated cephs in the past  -continue empiric vanco pharm to dose  -follow up cx data, tailor abx accordingly  -follow up path      Tobacco abuse  Pt amenable to quitting, at risk for worsening PVD - continue nicotine patch    Type II diabetes mellitus with neurological manifestations  Needs good BG control for optimal wound healing    Splenectomy   -Patient will need the following vaccines (can be done as outpatient) for asplenia/functional asplenia:   - Hib x 1 dose  - pneumococcal if not updated  - Meningococcal conjugate vaccine (Menactra): 8 weeks after Prevnar, repeat in 8 weeks, then a booster every 5 years  - Meningococcal B vaccine (Bexsero): 2 doses at least 1 month apart   - Influenza yearly  - Shingrex (if >50 yr old): 2 doses  by 2-6 mo          Thank you for your consult. I will follow-up with patient. Please contact us if you have any additional questions. Above d/w primary team.         Keyona GONZALEZ  MD Sulaiman  Infectious Disease  [facility] - Med Surg    Subjective:     Principal Problem: Open wound of left foot    HPI: 51 yo female with bipolar d/o, prior splenectomy, DM c/b peripheral neuropathy and prior polymicrobial diabetic foot ulcer, prior mrsa bacteremia, tobacco use, and CAD admitted for wound infection. ID consulted for abx recs. Pt reported several month history of  L wound present with associated drainage and pain that worsened the past couple of weeks. Work up notable for MRI with advanced charcot arthropathy with phlegmon present - s/p OR 12/24, OR cx/path in process. Blcx no growth to date. Pt is currently on vancomycin and aztreonam. Reported anaphylaxis with PCN, though has tolerated keflex/ancef/cefepime in the past. Previously on levofloxacin prior to admission and had wound cx obtained 9/7 with strep/proteus for which pt tolerated cefadroxil that was prescribed. Denies toxic habits, reports tobacco use. Lives in a [facility] trailer, no pets or travel.         Past Medical History:   Diagnosis Date    ADHD (attention deficit hyperactivity disorder)     Arthritis     Asthma     Bipolar 1 disorder     Cataract     Cigarette smoker     COPD (chronic obstructive pulmonary disease)     Coronary artery disease     A fib    Depression     bipolar manic depresson    Diabetes mellitus     Diabetic foot ulcers     Diabetic neuropathy     DVT of lower extremity, bilateral 07/2013    bilateral LE DVT. Estelita filter placed.     Encounter for blood transfusion     History of blood clots 1. Left Leg=2003; 2.Bilateral Groin=Blood Clots= 5 or 6/ 2013 & 7/2013; 3. LLL of Lung=7/2013;  4. Lt. Lower Leg=7/2013.     Pt. had 1st Blood Clot after Cuwaqajkddgs=6672, & Last=2013. Boykin Filter= Rt.Lateral Neck.    HTN (hypertension) 06/06/2013    Pt states that she does not have hypertension    Hypercholesteremia     Irregular heartbeat     Neuromuscular disorder     neuropathy feet    Obese  "    PE (pulmonary embolism) 07/2013    bilat LE DVT.     Restless leg syndrome        Past Surgical History:   Procedure Laterality Date    ABDOMINAL SURGERY  2010    gastric sleeve    BILATERAL OOPHORECTOMY Bilateral 1/12/2015    CHOLECYSTECTOMY      DEBRIDEMENT OF FOOT Bilateral 5/10/2022    Procedure: DEBRIDEMENT, FOOT;  Surgeon: Maira De Los Santos DPM;  Location: [facility] OR;  Service: Podiatry;  Laterality: Bilateral;    Green' s filter Right 7/4/2012    Right Neck & Tunneled Down.    HERNIA REPAIR      "[place] [employer] around th [place].", pt. states    LAPAROSCOPIC CHOLECYSTECTOMY N/A 9/10/2020    Procedure: CHOLECYSTECTOMY, LAPAROSCOPIC;  Surgeon: Montrell Gutierrez MD;  Location: [facility] OR;  Service: General;  Laterality: N/A;  RN PREOP 9/9----COVID Negative  9/9    OVARIAN CYST REMOVAL  3/13/2014    DE REMOVAL OF OVARY/TUBE(S)      SPLENECTOMY, TOTAL  July 2003    TONSILLECTOMY      as a child    TYMPANOSTOMY TUBE PLACEMENT  1976    VEIN SURGERY  2003    Lt leg       Review of patient's allergies indicates:   Allergen Reactions    Morphine Other (See Comments)     Patient had a psychotic episode after taking Morphine  Agitation, hallucinations    Penicillins Anaphylaxis     itching    Januvia [sitagliptin] Hives    Carbamazepine Other (See Comments)     hyponatremia       Medications:  Medications Prior to Admission   Medication Sig    apixaban (ELIQUIS) 5 mg Tab Take 1 tablet (5 mg total) by mouth 2 (two) times daily.    aspirin 81 MG Chew Take 1 tablet (81 mg total) by mouth once daily.    bumetanide (BUMEX) 1 MG tablet Take 1 tablet (1 mg total) by mouth once daily.    DUPIXENT  mg/2 mL PnIj Inject into the skin.    hydrOXYzine (ATARAX) 50 MG tablet Take 50 mg by mouth 4 (four) times daily as needed.    lisinopriL 10 MG tablet Take 1 tablet (10 mg total) by mouth once daily.    meloxicam (MOBIC) 15 MG tablet Take 1 tablet (15 mg total) by mouth once daily.    " metFORMIN (GLUCOPHAGE) 1000 MG tablet Take 1 tablet (1,000 mg total) by mouth 2 (two) times daily with meals.    methocarbamoL (ROBAXIN) 500 MG Tab Take 500 mg by mouth. Frequency could not be confirmed.    metoprolol tartrate (LOPRESSOR) 50 MG tablet Take 1 tablet (50 mg total) by mouth 2 (two) times daily.    pravastatin (PRAVACHOL) 40 MG tablet Take 1 tablet (40 mg total) by mouth every evening.    semaglutide (OZEMPIC) 1 mg/dose (4 mg/3 mL) Inject 1 mg into the skin every 7 days.    VYVANSE 40 mg Cap Take 40 mg by mouth once daily.    acetaminophen (TYLENOL) 500 MG tablet Take 2 tablets (1,000 mg total) by mouth every 6 (six) hours as needed for Pain.    albuterol (PROVENTIL/VENTOLIN HFA) 90 mcg/actuation inhaler INHALE 2 PUFFS INTO THE LUNGS EVERY 6 HOURS AS NEEDED FOR WHEEZING. RESCUE    albuterol-ipratropium (DUO-NEB) 2.5 mg-0.5 mg/3 mL nebulizer solution Take 3 mLs by nebulization every 6 (six) hours as needed for Wheezing or Shortness of Breath. Rescue    ammonium lactate (LAC-HYDRIN) 12 % lotion APPL Y ONCE TOPICALLY TWICE DAILY FOR 30 DAYS    BINAXNOW COVID-19 AG SELF TEST Kit TEST AS DIRECTED TODAY    carBAMazepine (TEGRETOL) 200 mg tablet Take 400 mg by mouth 2 (two) times daily.    ceFEPIme (MAXIPIME) 1 gram injection EMPTY CONTENTS OF 1 VIAL INTO SHAKER. SPRINKLE DIRECTLY ONTO INFECTION SITE. PERFORM ONCE DAILY.    cetirizine (ZYRTEC) 10 MG tablet Take 1 tablet (10 mg total) by mouth once daily. for 10 days    divalproex (DEPAKOTE) 250 MG EC tablet Take 5 tablets (1,250 mg total) by mouth every evening.    divalproex (DEPAKOTE) 500 MG TbEC Take 1 tablet (500 mg total) by mouth once daily. PO QAM    fluticasone propionate (FLONASE) 50 mcg/actuation nasal spray 2 sprays (100 mcg total) by Each Nostril route daily as needed (Nasal congestion).    fluticasone-salmeterol diskus inhaler 250-50 mcg Inhale 1 puff into the lungs 2 (two) times daily. Controller    gabapentin (NEURONTIN) 300 MG  capsule TAKE 2 CAPSULES(600 MG) BY MOUTH TWICE DAILY    HYDROcodone-acetaminophen (NORCO)  mg per tablet Take 1 tablet by mouth every 6 (six) hours as needed for Pain.    levoFLOXacin (LEVAQUIN) 750 MG tablet Take 1 tablet (750 mg total) by mouth once daily.    loratadine (CLARITIN) 10 mg tablet Take 1 tablet (10 mg total) by mouth once daily.    multivitamin Tab Take 1 tablet by mouth once daily.    nystatin (NYSTOP) powder APPLY TO ABDOMINAL AND BREAST SKIN FOLD TWICE DAILY.    pantoprazole (PROTONIX) 40 MG tablet Take 1 tablet (40 mg total) by mouth once daily.    potassium chloride (MICRO-K) 10 MEQ CpSR Take 1 capsule (10 mEq total) by mouth once daily.    risperiDONE (RISPERDAL M-TABS) 3 MG disintegrating tablet Take 1 tablet (3 mg total) by mouth 2 (two) times daily.    triamcinolone acetonide 0.025% (KENALOG) 0.025 % cream Apply topically 2 (two) times daily.     Antibiotics (From admission, onward)      Start     Stop Route Frequency Ordered    12/23/22 1215  clindamycin in D5W 900 mg/50 mL IVPB 900 mg         -- IV Every 8 hours (non-standard times) 12/23/22 1108    12/23/22 0830  vancomycin (VANCOCIN) 1,750 mg in dextrose 5 % 500 mL IVPB         -- IV Every 12 hours (non-standard times) 12/22/22 2040    12/23/22 0700  aztreonam 1 g in dextrose 5 % 50 mL IVPB (ready to mix system)         -- IV Every 12 hours (non-standard times) 12/22/22 2234    12/22/22 1909  vancomycin - pharmacy to dose  (vancomycin IVPB)        See Hyperspace for full Linked Orders Report.    -- IV pharmacy to manage frequency 12/22/22 1809          Antifungals (From admission, onward)      None          Antivirals (From admission, onward)      None             Immunization History   Administered Date(s) Administered    Hepatitis B, Adult 01/09/2014, 02/13/2014    Influenza 01/14/2015    Influenza - Quadrivalent 09/18/2015    Influenza - Quadrivalent - PF *Preferred* (6 months and older) 11/18/2016, 10/24/2017,  10/31/2018, 2019, 2020, 2022, 2022    Influenza - Trivalent - PF (ADULT) 2015    Pneumococcal Polysaccharide - 23 Valent 10/24/2017    Tdap 2014       Family History       Problem Relation (Age of Onset)    Cataracts Father    Diabetes Father, Paternal Grandfather    Heart disease Father, Paternal Grandfather    Hypertension Father    No Known Problems Mother, Sister, Brother, Maternal Aunt, Maternal Uncle, Paternal Aunt, Paternal Uncle, Maternal Grandfather    Ovarian cancer Maternal Grandmother, Paternal Grandmother          Social History     Socioeconomic History    Marital status: Significant Other   Tobacco Use    Smoking status: Every Day     Packs/day: 1.00     Years: 37.00     Pack years: 37.00     Types: Cigarettes     Last attempt to quit: 2020     Years since quittin.0    Smokeless tobacco: Never    Tobacco comments:     Enrolled in the [employer] on 5/3/14 ([facility] Member ID # [id number]). Ambulatory referral to Smoking Cessation Program   Substance and Sexual Activity    Alcohol use: No     Alcohol/week: 0.0 standard drinks    Drug use: No    Sexual activity: Yes     Partners: Male   Social History Narrative     from her  of 20 years    Lives by herself since      Social Determinants of Health     Financial Resource Strain: Unknown    Difficulty of Paying Living Expenses: Patient refused   Food Insecurity: Unknown    Worried About Running Out of Food in the Last Year: Patient refused    Ran Out of Food in the Last Year: Patient refused   Transportation Needs: Unknown    Lack of Transportation (Medical): Patient refused    Lack of Transportation (Non-Medical): Patient refused   Physical Activity: Unknown    Days of Exercise per Week: Patient refused    Minutes of Exercise per Session: Patient refused   Stress: Unknown    Feeling of Stress : Patient refused   Social Connections: Unknown    Frequency of Communication with  Friends and Family: Patient refused    Frequency of Social Gatherings with Friends and Family: Patient refused    Attends [employer] Services: Patient refused    Active Member of Clubs or Organizations: Patient refused    Attends Club or Organization Meetings: Patient refused    Marital Status: Patient refused   Housing Stability: Unknown    Unable to Pay for Housing in the Last Year: Patient refused    Unstable Housing in the Last Year: Patient refused     Review of Systems   Constitutional:  Negative for chills and fever.   Gastrointestinal:  Positive for constipation.   Skin:  Positive for rash and wound.   All other systems reviewed and are negative.  Objective:     Vital Signs (Most Recent):  Temp: 97.8 °F (36.6 °C) (12/24/22 0952)  Pulse: 78 (12/24/22 1020)  Resp: 15 (12/24/22 1020)  BP: 134/67 (12/24/22 1020)  SpO2: (!) 94 % (12/24/22 1020)   Vital Signs (24h Range):  Temp:  [97.6 °F (36.4 °C)-98.4 °F (36.9 °C)] 97.8 °F (36.6 °C)  Pulse:  [76-85] 78  Resp:  [14-20] 15  SpO2:  [93 %-100 %] 94 %  BP: (100-134)/(50-67) 134/67     Weight: 106.6 kg (235 lb)  Body mass index is 37.93 kg/m².    Estimated Creatinine Clearance: 118.7 mL/min (based on SCr of 0.7 mg/dL).    Physical Exam  Constitutional:       General: She is not in acute distress.     Appearance: She is not ill-appearing or toxic-appearing.   HENT:      Head: Normocephalic and atraumatic.      Right Ear: External ear normal.      Left Ear: External ear normal.      Mouth/Throat:      Mouth: Mucous membranes are moist.      Comments: Poor dentition    Eyes:      General: No scleral icterus.        Right eye: No discharge.         Left eye: No discharge.   Cardiovascular:      Rate and Rhythm: Normal rate and regular rhythm.   Pulmonary:      Effort: Pulmonary effort is normal. No respiratory distress.      Breath sounds: No stridor. No wheezing or rhonchi.   Abdominal:      General: There is no distension.      Palpations: Abdomen is soft.       Tenderness: There is no abdominal tenderness. There is no guarding.   Musculoskeletal:      Right lower leg: No edema.      Left lower leg: No edema.   Skin:     General: Skin is warm and dry.      Coloration: Skin is not jaundiced.      Findings: Erythema (LLE erythema - improving per pt) present. No bruising.      Comments: L foot wrapped   Neurological:      Mental Status: She is alert and oriented to person, place, and time. Mental status is at baseline.      Motor: No weakness.   Psychiatric:         Mood and Affect: Mood normal.         Behavior: Behavior normal.       Significant Labs:   Microbiology Results (last 7 days)       Procedure Component Value Units Date/Time    Fungus culture [[id number]] Collected: 12/24/22 0933    Order Status: Sent Specimen: Bone from Foot, Left Updated: 12/24/22 0933    AFB Culture & Smear [[id number]] Collected: 12/24/22 0933    Order Status: Sent Specimen: Bone from Foot, Left Updated: 12/24/22 0933    Culture, Anaerobe [[id number]] Collected: 12/24/22 0933    Order Status: Sent Specimen: Bone from Foot, Left Updated: 12/24/22 0933    Aerobic culture [[id number]] Collected: 12/24/22 0933    Order Status: Sent Specimen: Bone from Foot, Left Updated: 12/24/22 0933    Gram stain [[id number]] Collected: 12/24/22 0933    Order Status: Sent Specimen: Bone from Foot, Left Updated: 12/24/22 0933    Blood culture x two cultures. Draw prior to antibiotics. [[id number]] Collected: 12/22/22 1751    Order Status: Completed Specimen: Blood from Peripheral, Antecubital, Left Updated: 12/23/22 1903     Blood Culture, Routine No Growth to date      No Growth to date    Narrative:      Aerobic and anaerobic    Blood culture x two cultures. Draw prior to antibiotics. [[id number]] Collected: 12/22/22 1743    Order Status: Completed Specimen: Blood from Peripheral, Forearm, Left Updated: 12/23/22 1903     Blood Culture, Routine No Growth to date      No Growth to date    Narrative:      Aerobic and  anaerobic    Gram stain [[id number]] Collected: 12/23/22 1115    Order Status: Completed Specimen: Wound from Foot, Left Updated: 12/23/22 1316     Gram Stain Result No organisms seen      No WBC's    Aerobic culture [[id number]] Collected: 12/23/22 1115    Order Status: Sent Specimen: Wound from Foot, Left Updated: 12/23/22 1158    Culture, Anaerobe [[id number]] Collected: 12/23/22 1115    Order Status: Sent Specimen: Wound from Foot, Left Updated: 12/23/22 1158            Significant Imaging: I have reviewed all pertinent imaging results/findings within the past 24 hours.

## 2022-12-24 NOTE — ASSESSMENT & PLAN NOTE
53 yo female with bipolar d/o, prior splenectomy, DM c/b peripheral neuropathy and prior polymicrobial diabetic foot ulcer, prior mrsa bacteremia, tobacco use, and CAD admitted for wound infection.. Work up notable for MRI with advanced charcot arthropathy with phlegmon present and possible OM - s/p OR 12/24, OR cx/path in process.     Recommendations:  -stop clinda and aztreonam  -start empiric cefepime - tolerated cephs in the past  -continue empiric vanco pharm to dose  -follow up cx data, tailor abx accordingly  -follow up path

## 2022-12-24 NOTE — ASSESSMENT & PLAN NOTE
-Pt with erythema and wound of LLE. Follows with wound care  -Was on oral levaquin outpatient for one week without improvement  -XR left foot: Large plantar ulceration.  No acute fracture.  -ID consulted  -Continue vancomycin day 3, cefepime day 1

## 2022-12-24 NOTE — PLAN OF CARE
Problem: Impaired Wound Healing  Goal: Optimal Wound Healing  Outcome: Ongoing, Progressing  Intervention: Promote Wound Healing  Flowsheets (Taken 12/24/2022 1334)  Oral Nutrition Promotion:   medicated   rest periods promoted   safe use of adaptive equipment encouraged  Sleep/Rest Enhancement: regular sleep/rest pattern promoted  Activity Management: (non weight baring to left foot)   Rolling - L1   Walk with assistive devise and /or staff member - L3  Pain Management Interventions:   around-the-clock dosing utilized   pain management plan reviewed with patient/caregiver   pillow support provided   relaxation techniques promoted

## 2022-12-24 NOTE — ANESTHESIA POSTPROCEDURE EVALUATION
Anesthesia Post Evaluation    Patient: Audrey Natarajan    Procedure(s) Performed: Procedure(s) (LRB):  INCISION AND DRAINAGE, FOOT (Left)    Final Anesthesia Type: general      Patient location during evaluation: PACU  Patient participation: Yes- Able to Participate  Level of consciousness: awake and alert and oriented  Post-procedure vital signs: reviewed and stable  Pain management: adequate  Airway patency: patent    PONV status at discharge: No PONV  Anesthetic complications: no      Cardiovascular status: blood pressure returned to baseline, hemodynamically stable and stable  Respiratory status: unassisted, spontaneous ventilation and room air  Hydration status: euvolemic  Follow-up not needed.          Vitals Value Taken Time   /67 12/24/22 1020   Temp 36.6 °C (97.8 °F) 12/24/22 0952   Pulse 78 12/24/22 1020   Resp 25 12/24/22 1020   SpO2 94 % 12/24/22 1020   Vitals shown include unvalidated device data.      Event Time   Out of Recovery 12/24/2022 10:28:05         Pain/Kristi Score: Pain Rating Prior to Med Admin: 4 (12/24/2022  6:32 AM)  Pain Rating Post Med Admin: 4 (12/23/2022 12:00 PM)  Kristi Score: 10 (12/24/2022 10:20 AM)

## 2022-12-24 NOTE — ASSESSMENT & PLAN NOTE
-MRI left foot: Large infectious/inflammatory phlegmon at the plantar aspect of the midfoot with multiple small interconnecting abscesses. Advanced Charcot arthropathy throughout the midfoot and probably hindfoot.  Given the proximity to the ulcer and inflammatory phlegmon, early septic arthritis and osteomyelitis cannot be excluded.   -Blood cx with NGTD   -Wound culture with staph aureus, susceptibility pending   -podiatry consulted: s/p incision and drainage on 12/24  -infectious disease consulted  -continue IV vancomycin and cefepime at this time

## 2022-12-24 NOTE — ANESTHESIA PREPROCEDURE EVALUATION
12/24/2022  Audrey Natarajan is a 52 y.o., female.      Pre-op Assessment     I have reviewed the Nursing Notes.       Review of Systems  Anesthesia Hx:  No problems with previous Anesthesia    Social:  Smoker    Cardiovascular:   Denies Pacemaker. Hypertension  Denies Valvular problems/Murmurs. CAD  asymptomatic  Denies CABG/stent.  hyperlipidemia ECG has been reviewed. Hx PE in 2013, now w/ nelly filter  Echo 5/2022  ? Moderate left atrial enlargement.  ? The left ventricle is normal in size with normal systolic function.  ? The estimated ejection fraction is 65%.  ? Normal left ventricular diastolic function.  ? Normal right ventricular size with normal right ventricular systolic function.  ? Intermediate central venous pressure (8 mmHg).  ? The estimated PA systolic pressure is 26 mmHg.  ? There is no pulmonary hypertension.   Pulmonary:   COPD Asthma Sleep Apnea    Renal/:  Renal/ Normal     Hepatic/GI:   Liver Disease, (liver fibrosis and fatty liver)    Musculoskeletal:   Arthritis     Neurological:   Denies CVA. Denies Seizures.    Endocrine:   Diabetes, type 2  Obesity / BMI > 30  Psych:   Psychiatric History          Physical Exam  General: Well nourished    Airway:  Mallampati: II   Mouth Opening: Normal  Tongue: Normal  Neck ROM: Normal ROM    Dental:  Intact        Anesthesia Plan  Type of Anesthesia, risks & benefits discussed:    Anesthesia Type: MAC  Intra-op Monitoring Plan: Standard ASA Monitors  Post Op Pain Control Plan: multimodal analgesia  Induction:  IV  Informed Consent: Informed consent signed with the Patient and all parties understand the risks and agree with anesthesia plan.  All questions answered.   ASA Score: 3    Ready For Surgery From Anesthesia Perspective.     .

## 2022-12-24 NOTE — ASSESSMENT & PLAN NOTE
- Patient was counseled regarding smoking for 3-10 minutes.  - Encourage smoking cessation daily  - NRT offered

## 2022-12-24 NOTE — ASSESSMENT & PLAN NOTE
Body mass index is 37.93 kg/m². Morbid obesity complicates all aspects of disease management from diagnostic modalities to treatment. Weight loss encouraged and health benefits explained to patient.

## 2022-12-24 NOTE — PROGRESS NOTES
[facility]  Progress Note    Patient Name: Audrey Natarajan  MRN: [id number]  Patient Class: OP- Observation   Admission Date: 12/22/2022  Length of Stay: 0 days  Attending Physician: Velvet Galicia DO  Primary Care Provider: Donaldo Pena MD        Subjective:     Principal Problem:Cellulitis of left foot        HPI:  52 y.o. female with PMH of DM2, Charcot's foot, osteomyelitis of the foot, diabetic foot ulcer, b/l LE DVTs (on Eliquis), afib on eliquis and has a nelly filter, MRSA, PAD, h/o PE, COPD, HTN, Bipolar I, tobacco abuse, who presented presents to the emergency department for evaluation of chronic wound to her left foot.  Per wound care note pt is currently on oral abx. Does not feels well, was concerned regarding another potential clot and wound infection. ED workup revealed WBCs 17.51, H/H 10.3/30.9, Sed rate elevated, CRP elevated, blood cultures pending, chest x-ray showed improved interstitial attenuation, residual edema is not excluded.  No large focal consolidation.  Left foot x-ray showed large plantar ulceration.  No acute fracture.  Patient being admitted to hospital medicine for further medical management with podiatry and ortho consults.      Overview/Hospital Course:  52 y.o. female with PMH of DM2, Charcot's foot, osteomyelitis of the foot, diabetic foot ulcer, b/l LE DVTs (on Eliquis), afib on eliquis and has a nelly filter, MRSA, PAD, h/o PE, COPD, HTN, Bipolar I, tobacco abuse admitted to observation on 12/22/2022 for LLE wound and cellulitis. Was sent here from wound care clinic for further evaluation. XR left foot with Large plantar ulceration.  No acute fracture. Patient was started on broad spectrum antibiotics. Of note, patient was on oral levaquin for one week prior to admission. MRI left foot with Large infectious/inflammatory phlegmon at the plantar aspect of the midfoot with multiple small interconnecting abscesses Podiatry consulted-  s/p incision and drainage on 12/24. No seamus purulence, no drainable deep Abcess, but noted extensive phlegmon.   Blood cx with NGTD. Follow up on deep wound cultures and biopsy results. Continue broad spectrum abx at this time. ID consulted.       Interval History:  No acute overnight events.  Patient remained afebrile.  Pain is currently controlled.  Patient denies any bleeding or further drainage from left foot wound.  Agreeable to procedure today    Review of Systems   Constitutional:  Negative for fatigue and fever.   Cardiovascular:  Positive for leg swelling (left foot swelling). Negative for chest pain.   Gastrointestinal:  Negative for abdominal pain, nausea and vomiting.   Musculoskeletal:  Positive for arthralgias and joint swelling.   Skin:  Positive for rash and wound.   Neurological:  Negative for weakness.     Objective:     Vital Signs (Most Recent):  Temp: 98.4 °F (36.9 °C) (12/24/22 1048)  Pulse: 77 (12/24/22 1048)  Resp: 18 (12/24/22 1048)  BP: (!) 144/67 (12/24/22 1048)  SpO2: 96 % (12/24/22 1048)   Vital Signs (24h Range):  Temp:  [97.6 °F (36.4 °C)-98.4 °F (36.9 °C)] 98.4 °F (36.9 °C)  Pulse:  [76-85] 77  Resp:  [14-20] 18  SpO2:  [93 %-100 %] 96 %  BP: (100-144)/(50-67) 144/67     Weight: 106.6 kg (235 lb)  Body mass index is 37.93 kg/m².    Intake/Output Summary (Last 24 hours) at 12/24/2022 1123  Last data filed at 12/24/2022 0935  Gross per 24 hour   Intake 906.89 ml   Output --   Net 906.89 ml      Physical Exam  Vitals and nursing note reviewed.   Constitutional:       General: She is not in acute distress.     Appearance: She is obese. She is not ill-appearing.   HENT:      Mouth/Throat:      Mouth: Mucous membranes are dry.   Eyes:      Extraocular Movements: Extraocular movements intact.   Cardiovascular:      Rate and Rhythm: Normal rate and regular rhythm.      Heart sounds: No murmur heard.    No gallop.   Pulmonary:      Effort: Pulmonary effort is normal. No respiratory distress.       Breath sounds: Normal breath sounds. No wheezing.   Musculoskeletal:         General: Swelling and deformity (left charcot foot) present. No tenderness.      Comments: Left foot wrapped    Skin:     General: Skin is warm and dry.      Findings: Erythema present.      Comments: LLE erythema - improving per patient. No bruising. L foot wrapped    Neurological:      General: No focal deficit present.      Mental Status: She is alert and oriented to person, place, and time.   Psychiatric:         Mood and Affect: Mood normal.         Thought Content: Thought content normal.       Significant Labs: All pertinent labs within the past 24 hours have been reviewed.    Significant Imaging: I have reviewed all pertinent imaging results/findings within the past 24 hours.      Assessment/Plan:      * Cellulitis of left foot  -Pt with erythema and wound of LLE. Follows with wound care  -Was on oral levaquin outpatient for one week without improvement  -XR left foot: Large plantar ulceration.  No acute fracture.  -ID consulted  -Continue vancomycin day 3, cefepime day 1      Open wound of left foot  -MRI left foot: Large infectious/inflammatory phlegmon at the plantar aspect of the midfoot with multiple small interconnecting abscesses. Advanced Charcot arthropathy throughout the midfoot and probably hindfoot.  Given the proximity to the ulcer and inflammatory phlegmon, early septic arthritis and osteomyelitis cannot be excluded.   -Blood cx with NGTD   -Wound culture with staph aureus, susceptibility pending   -podiatry consulted: s/p incision and drainage on 12/24  -infectious disease consulted  -continue IV vancomycin and cefepime at this time      Charcot's joint of left foot  -Chronic  -Podiatry following     Type II diabetes mellitus with neurological manifestations  A1c:   Lab Results   Component Value Date    HGBA1C 7.1 (H) 12/22/2022     Meds:  SSI PRN to maintain goal 140-180  ADA diet, accuchecks ACHS, hypoglycemic  protocol      Essential hypertension  -Continue home meds lisinopril, metoprolol tartrate    Hyperlipidemia  continue statin      PAD (peripheral artery disease)  -US arterial from 05/2022: Sonogram suggest hemodynamically significant stenosis in the left and DPA. Multifocal mild to moderate grade stenoses is suspected throughout the left MIRACLE and, bilateral posterior tibial and peroneal arteries.  - Resume home eliquis, aspirin on 12/25 (after monitoring for post op bleeding)    Anemia  Chronic, stable  Iron profile ordered      Thrombocytosis  -chronic   - stable, appears near baseline   - Resume ASA 81 Mg QD tomorrow after monitoring for post op bleeding     COPD (chronic obstructive pulmonary disease)  Stable  duonebs prn     Bipolar 1 disorder  Stable  - continue home meds: depakote, olanzapine, risperidone     Tobacco abuse  - Patient was counseled regarding smoking for 3-10 minutes.  - Encourage smoking cessation daily  - NRT offered       History of DVT (deep vein thrombosis)  -Resume home eliquis when cleared by podiatry       History of pulmonary embolus (PE)  -Resume home eliquis when clear by podiatry       Class 2 severe obesity with serious comorbidity and body mass index (BMI) of 37.0 to 37.9 in adult  Body mass index is 37.93 kg/m². Morbid obesity complicates all aspects of disease management from diagnostic modalities to treatment. Weight loss encouraged and health benefits explained to patient.           VTE Risk Mitigation (From admission, onward)         Ordered     apixaban tablet 5 mg  2 times daily         12/24/22 1147     Place sequential compression device  Until discontinued         12/24/22 1021     IP VTE HIGH RISK PATIENT  Once         12/24/22 1021     Place sequential compression device  Until discontinued         12/22/22 5146                Discharge Planning   HUMBERTO: 12/23/2022     Code Status: Full Code   Is the patient medically ready for discharge?:     Reason for patient still in  hospital (select all that apply): Treatment and Consult recommendations  Discharge Plan A: Home                  Velvet Galicia DO  Department of Hospital Medicine   [facility]

## 2022-12-24 NOTE — ASSESSMENT & PLAN NOTE
-US arterial from 05/2022: Sonogram suggest hemodynamically significant stenosis in the left and DPA. Multifocal mild to moderate grade stenoses is suspected throughout the left MIRACLE and, bilateral posterior tibial and peroneal arteries.  - Resume home eliquis, aspirin on 12/25 (after monitoring for post op bleeding)

## 2022-12-25 LAB
ANION GAP SERPL CALC-SCNC: 8 MMOL/L (ref 8–16)
BACTERIA SPEC AEROBE CULT: ABNORMAL
BASOPHILS # BLD AUTO: 0.08 K/UL (ref 0–0.2)
BASOPHILS NFR BLD: 0.7 % (ref 0–1.9)
BUN SERPL-MCNC: 9 MG/DL (ref 6–20)
CALCIUM SERPL-MCNC: 8.4 MG/DL (ref 8.7–10.5)
CHLORIDE SERPL-SCNC: 104 MMOL/L (ref 95–110)
CO2 SERPL-SCNC: 26 MMOL/L (ref 23–29)
CREAT SERPL-MCNC: 0.6 MG/DL (ref 0.5–1.4)
DIFFERENTIAL METHOD: ABNORMAL
EOSINOPHIL # BLD AUTO: 0.5 K/UL (ref 0–0.5)
EOSINOPHIL NFR BLD: 4.5 % (ref 0–8)
ERYTHROCYTE [DISTWIDTH] IN BLOOD BY AUTOMATED COUNT: 17.3 % (ref 11.5–14.5)
EST. GFR  (NO RACE VARIABLE): >60 ML/MIN/1.73 M^2
GLUCOSE SERPL-MCNC: 149 MG/DL (ref 70–110)
GRAM STN SPEC: NORMAL
GRAM STN SPEC: NORMAL
HCT VFR BLD AUTO: 28.5 % (ref 37–48.5)
HGB BLD-MCNC: 9.3 G/DL (ref 12–16)
IMM GRANULOCYTES # BLD AUTO: 0.03 K/UL (ref 0–0.04)
IMM GRANULOCYTES NFR BLD AUTO: 0.3 % (ref 0–0.5)
LYMPHOCYTES # BLD AUTO: 3.6 K/UL (ref 1–4.8)
LYMPHOCYTES NFR BLD: 33 % (ref 18–48)
MAGNESIUM SERPL-MCNC: 1.9 MG/DL (ref 1.6–2.6)
MCH RBC QN AUTO: 25.6 PG (ref 27–31)
MCHC RBC AUTO-ENTMCNC: 32.6 G/DL (ref 32–36)
MCV RBC AUTO: 79 FL (ref 82–98)
MONOCYTES # BLD AUTO: 1.5 K/UL (ref 0.3–1)
MONOCYTES NFR BLD: 13.5 % (ref 4–15)
NEUTROPHILS # BLD AUTO: 5.3 K/UL (ref 1.8–7.7)
NEUTROPHILS NFR BLD: 48 % (ref 38–73)
NRBC BLD-RTO: 0 /100 WBC
PLATELET # BLD AUTO: 628 K/UL (ref 150–450)
PMV BLD AUTO: 8.9 FL (ref 9.2–12.9)
POCT GLUCOSE: 146 MG/DL (ref 70–110)
POCT GLUCOSE: 195 MG/DL (ref 70–110)
POCT GLUCOSE: 258 MG/DL (ref 70–110)
POCT GLUCOSE: 344 MG/DL (ref 70–110)
POTASSIUM SERPL-SCNC: 4.8 MMOL/L (ref 3.5–5.1)
RBC # BLD AUTO: 3.63 M/UL (ref 4–5.4)
SODIUM SERPL-SCNC: 138 MMOL/L (ref 136–145)
WBC # BLD AUTO: 10.92 K/UL (ref 3.9–12.7)

## 2022-12-25 PROCEDURE — 80048 BASIC METABOLIC PNL TOTAL CA: CPT | Performed by: STUDENT IN AN ORGANIZED HEALTH CARE EDUCATION/TRAINING PROGRAM

## 2022-12-25 PROCEDURE — 25000003 PHARM REV CODE 250: Performed by: EMERGENCY MEDICINE

## 2022-12-25 PROCEDURE — S4991 NICOTINE PATCH NONLEGEND: HCPCS | Performed by: STUDENT IN AN ORGANIZED HEALTH CARE EDUCATION/TRAINING PROGRAM

## 2022-12-25 PROCEDURE — 11000001 HC ACUTE MED/SURG PRIVATE ROOM

## 2022-12-25 PROCEDURE — 25000003 PHARM REV CODE 250: Performed by: NURSE PRACTITIONER

## 2022-12-25 PROCEDURE — 25000003 PHARM REV CODE 250: Performed by: STUDENT IN AN ORGANIZED HEALTH CARE EDUCATION/TRAINING PROGRAM

## 2022-12-25 PROCEDURE — 36415 COLL VENOUS BLD VENIPUNCTURE: CPT | Performed by: STUDENT IN AN ORGANIZED HEALTH CARE EDUCATION/TRAINING PROGRAM

## 2022-12-25 PROCEDURE — 63600175 PHARM REV CODE 636 W HCPCS: Performed by: NURSE PRACTITIONER

## 2022-12-25 PROCEDURE — 99233 PR SUBSEQUENT HOSPITAL CARE,LEVL III: ICD-10-PCS | Mod: ,,, | Performed by: STUDENT IN AN ORGANIZED HEALTH CARE EDUCATION/TRAINING PROGRAM

## 2022-12-25 PROCEDURE — 63600175 PHARM REV CODE 636 W HCPCS: Performed by: STUDENT IN AN ORGANIZED HEALTH CARE EDUCATION/TRAINING PROGRAM

## 2022-12-25 PROCEDURE — 63600175 PHARM REV CODE 636 W HCPCS: Performed by: EMERGENCY MEDICINE

## 2022-12-25 PROCEDURE — 27000207 HC ISOLATION

## 2022-12-25 PROCEDURE — 83735 ASSAY OF MAGNESIUM: CPT | Performed by: STUDENT IN AN ORGANIZED HEALTH CARE EDUCATION/TRAINING PROGRAM

## 2022-12-25 PROCEDURE — 85025 COMPLETE CBC W/AUTO DIFF WBC: CPT | Performed by: STUDENT IN AN ORGANIZED HEALTH CARE EDUCATION/TRAINING PROGRAM

## 2022-12-25 PROCEDURE — 99233 SBSQ HOSP IP/OBS HIGH 50: CPT | Mod: ,,, | Performed by: STUDENT IN AN ORGANIZED HEALTH CARE EDUCATION/TRAINING PROGRAM

## 2022-12-25 PROCEDURE — 94640 AIRWAY INHALATION TREATMENT: CPT

## 2022-12-25 RX ORDER — ASPIRIN 81 MG/1
81 TABLET ORAL DAILY
Status: DISCONTINUED | OUTPATIENT
Start: 2022-12-25 | End: 2022-12-27 | Stop reason: HOSPADM

## 2022-12-25 RX ORDER — LANOLIN ALCOHOL/MO/W.PET/CERES
1 CREAM (GRAM) TOPICAL DAILY
Status: DISCONTINUED | OUTPATIENT
Start: 2022-12-25 | End: 2022-12-27 | Stop reason: HOSPADM

## 2022-12-25 RX ORDER — LANOLIN ALCOHOL/MO/W.PET/CERES
400 CREAM (GRAM) TOPICAL ONCE
Status: COMPLETED | OUTPATIENT
Start: 2022-12-25 | End: 2022-12-25

## 2022-12-25 RX ADMIN — GABAPENTIN 600 MG: 300 CAPSULE ORAL at 08:12

## 2022-12-25 RX ADMIN — ASPIRIN 81 MG: 81 TABLET, COATED ORAL at 12:12

## 2022-12-25 RX ADMIN — METOPROLOL TARTRATE 50 MG: 50 TABLET, FILM COATED ORAL at 08:12

## 2022-12-25 RX ADMIN — RISPERIDONE 3 MG: 1 TABLET ORAL at 08:12

## 2022-12-25 RX ADMIN — VANCOMYCIN HYDROCHLORIDE 1750 MG: 500 INJECTION, POWDER, LYOPHILIZED, FOR SOLUTION INTRAVENOUS at 09:12

## 2022-12-25 RX ADMIN — FERROUS SULFATE TAB 325 MG (65 MG ELEMENTAL FE) 1 EACH: 325 (65 FE) TAB at 08:12

## 2022-12-25 RX ADMIN — APIXABAN 5 MG: 5 TABLET, FILM COATED ORAL at 08:12

## 2022-12-25 RX ADMIN — THERA TABS 1 TABLET: TAB at 08:12

## 2022-12-25 RX ADMIN — TRAMADOL HYDROCHLORIDE 50 MG: 50 TABLET, COATED ORAL at 06:12

## 2022-12-25 RX ADMIN — INSULIN ASPART 1 UNITS: 100 INJECTION, SOLUTION INTRAVENOUS; SUBCUTANEOUS at 08:12

## 2022-12-25 RX ADMIN — DIVALPROEX SODIUM 1250 MG: 250 TABLET, DELAYED RELEASE ORAL at 08:12

## 2022-12-25 RX ADMIN — APIXABAN 5 MG: 5 TABLET, FILM COATED ORAL at 12:12

## 2022-12-25 RX ADMIN — LIDOCAINE 1 PATCH: 50 PATCH TOPICAL at 08:12

## 2022-12-25 RX ADMIN — Medication 1 PATCH: at 08:12

## 2022-12-25 RX ADMIN — PRAVASTATIN SODIUM 40 MG: 40 TABLET ORAL at 08:12

## 2022-12-25 RX ADMIN — CEFEPIME 2 G: 2 INJECTION, POWDER, FOR SOLUTION INTRAVENOUS at 08:12

## 2022-12-25 RX ADMIN — LISINOPRIL 10 MG: 5 TABLET ORAL at 08:12

## 2022-12-25 RX ADMIN — FLUTICASONE FUROATE AND VILANTEROL TRIFENATATE 1 PUFF: 100; 25 POWDER RESPIRATORY (INHALATION) at 08:12

## 2022-12-25 RX ADMIN — CEFEPIME 2 G: 2 INJECTION, POWDER, FOR SOLUTION INTRAVENOUS at 04:12

## 2022-12-25 RX ADMIN — INSULIN ASPART 4 UNITS: 100 INJECTION, SOLUTION INTRAVENOUS; SUBCUTANEOUS at 04:12

## 2022-12-25 RX ADMIN — MAGNESIUM OXIDE TAB 400 MG (241.3 MG ELEMENTAL MG) 400 MG: 400 (241.3 MG) TAB at 08:12

## 2022-12-25 RX ADMIN — CEFEPIME 2 G: 2 INJECTION, POWDER, FOR SOLUTION INTRAVENOUS at 12:12

## 2022-12-25 RX ADMIN — DIVALPROEX SODIUM 500 MG: 250 TABLET, DELAYED RELEASE ORAL at 08:12

## 2022-12-25 RX ADMIN — VANCOMYCIN HYDROCHLORIDE 1750 MG: 500 INJECTION, POWDER, LYOPHILIZED, FOR SOLUTION INTRAVENOUS at 10:12

## 2022-12-25 NOTE — PROGRESS NOTES
[facility] Medicine  Progress Note    Patient Name: Audrey Natarajan  MRN: [id number]  Patient Class: IP- Inpatient   Admission Date: 12/22/2022  Length of Stay: 0 days  Attending Physician: Keyona Darden MD  Primary Care Provider: Donaldo Pena MD        Subjective:     Principal Problem:Cellulitis of left foot        HPI:  52 y.o. female with PMH of DM2, Charcot's foot, osteomyelitis of the foot, diabetic foot ulcer, b/l LE DVTs (on Eliquis), afib on eliquis and has a nelly filter, MRSA, PAD, h/o PE, COPD, HTN, Bipolar I, tobacco abuse, who presented presents to the emergency department for evaluation of chronic wound to her left foot.  Per wound care note pt is currently on oral abx. Does not feels well, was concerned regarding another potential clot and wound infection. ED workup revealed WBCs 17.51, H/H 10.3/30.9, Sed rate elevated, CRP elevated, blood cultures pending, chest x-ray showed improved interstitial attenuation, residual edema is not excluded.  No large focal consolidation.  Left foot x-ray showed large plantar ulceration.  No acute fracture.  Patient being admitted to hospital medicine for further medical management with podiatry and ortho consults.      Overview/Hospital Course:  52 y.o. female with PMH of DM2, Charcot's foot, osteomyelitis of the foot, diabetic foot ulcer, b/l LE DVTs (on Eliquis), afib on eliquis and has a nelly filter, MRSA, PAD, h/o PE, COPD, HTN, Bipolar I, tobacco abuse admitted to observation on 12/22/2022 for LLE wound and cellulitis. Was sent here from wound care clinic for further evaluation. XR left foot with Large plantar ulceration.  No acute fracture. Patient was started on broad spectrum antibiotics. Of note, patient was on oral levaquin for one week prior to admission. MRI left foot with Large infectious/inflammatory phlegmon at the plantar aspect of the midfoot with multiple small interconnecting abscesses Podiatry consulted-  s/p incision and drainage on 12/24. No seamus purulence, no drainable deep Abcess, but noted extensive phlegmon.   Blood cx with NGTD. Pathology pending. Wound culture with MRSA. Continue broad spectrum abx at this time. ID consulted.       Interval History:  No acute overnight events.  Patient remained afebrile.  Pain is currently controlled.  Denies any nausea or vomiting.  Tolerating IV antibiotics    Review of Systems   Constitutional:  Negative for fatigue and fever.   Cardiovascular:  Positive for leg swelling (left foot swelling). Negative for chest pain.   Gastrointestinal:  Negative for abdominal pain, nausea and vomiting.   Musculoskeletal:  Positive for arthralgias and joint swelling.   Skin:  Positive for rash and wound.   Neurological:  Negative for weakness.     Objective:     Vital Signs (Most Recent):  Temp: 98 °F (36.7 °C) (12/25/22 1111)  Pulse: 71 (12/25/22 1111)  Resp: 17 (12/25/22 1111)  BP: (!) 165/71 (12/25/22 1111)  SpO2: 97 % (12/25/22 1111)   Vital Signs (24h Range):  Temp:  [98 °F (36.7 °C)-98.8 °F (37.1 °C)] 98 °F (36.7 °C)  Pulse:  [69-90] 71  Resp:  [17-19] 17  SpO2:  [95 %-100 %] 97 %  BP: (118-180)/(58-78) 165/71     Weight: 106.6 kg (235 lb)  Body mass index is 37.93 kg/m².    Intake/Output Summary (Last 24 hours) at 12/25/2022 1114  Last data filed at 12/25/2022 0814  Gross per 24 hour   Intake 600 ml   Output 1850 ml   Net -1250 ml        Physical Exam  Vitals and nursing note reviewed.   Constitutional:       General: She is not in acute distress.     Appearance: She is obese. She is not ill-appearing.   HENT:      Mouth/Throat:      Mouth: Mucous membranes are moist.   Eyes:      Extraocular Movements: Extraocular movements intact.   Cardiovascular:      Rate and Rhythm: Normal rate and regular rhythm.      Heart sounds: No murmur heard.    No gallop.   Pulmonary:      Effort: Pulmonary effort is normal. No respiratory distress.      Breath sounds: Normal breath sounds. No  wheezing.   Musculoskeletal:         General: Swelling and deformity (left charcot foot) present. No tenderness.      Comments: Left foot wrapped    Skin:     General: Skin is warm and dry.      Findings: Erythema present.      Comments: LLE erythema - improving per patient. No bruising. L foot wrapped    Neurological:      General: No focal deficit present.      Mental Status: She is alert and oriented to person, place, and time.   Psychiatric:         Mood and Affect: Mood normal.         Thought Content: Thought content normal.       Significant Labs: All pertinent labs within the past 24 hours have been reviewed.    Significant Imaging: I have reviewed all pertinent imaging results/findings within the past 24 hours.      Assessment/Plan:      * Cellulitis of left foot  -Pt with erythema and wound of LLE. Follows with wound care  -Was on oral levaquin outpatient for one week without improvement  -XR left foot: Large plantar ulceration.  No acute fracture.  -Wound culture with +MRSA  -ID consulted  -Continue vancomycin day 4, cefepime day 2      Open wound of left foot  -MRI left foot: Large infectious/inflammatory phlegmon at the plantar aspect of the midfoot with multiple small interconnecting abscesses. Advanced Charcot arthropathy throughout the midfoot and probably hindfoot.  Given the proximity to the ulcer and inflammatory phlegmon, early septic arthritis and osteomyelitis cannot be excluded.   -Blood cx with NGTD   -Wound culture with +MRSA. Will likely need long term abx  -podiatry consulted: s/p incision and drainage on 12/24  -infectious disease consulted  -continue IV vancomycin and cefepime at this time      Charcot's joint of left foot  -Chronic  -Podiatry following     Type II diabetes mellitus with neurological manifestations  A1c:   Lab Results   Component Value Date    HGBA1C 7.1 (H) 12/22/2022     Meds:  SSI PRN to maintain goal 140-180  ADA diet, accuchecks ACHS, hypoglycemic  protocol      Essential hypertension  -Continue home meds lisinopril, metoprolol tartrate    Hyperlipidemia  continue statin      PAD (peripheral artery disease)  -US arterial from 05/2022: Sonogram suggest hemodynamically significant stenosis in the left and DPA. Multifocal mild to moderate grade stenoses is suspected throughout the left MIRACLE and, bilateral posterior tibial and peroneal arteries.  - Resume home eliquis, aspirin on 12/25. Okay per podiatry     Anemia  Chronic, stable  Iron profile with low iron and low iron sats  PO iron supplement       Thrombocytosis  -chronic   - stable, appears near baseline   - Resume ASA 81 Mg QD. Okay per podiatry     COPD (chronic obstructive pulmonary disease)  Stable  duonebs prn     Bipolar 1 disorder  Stable  - continue home meds: depakote, olanzapine, risperidone     Tobacco abuse  - Patient was counseled regarding smoking for 3-10 minutes.  - Encourage smoking cessation daily  - NRT offered       History of DVT (deep vein thrombosis)  -Resume home eliquis , cleared by podiatry       History of pulmonary embolus (PE)  -Resume home eliquis. clear by podiatry       Class 2 severe obesity with serious comorbidity and body mass index (BMI) of 37.0 to 37.9 in adult  Body mass index is 37.93 kg/m². Morbid obesity complicates all aspects of disease management from diagnostic modalities to treatment. Weight loss encouraged and health benefits explained to patient.           VTE Risk Mitigation (From admission, onward)         Ordered     apixaban tablet 5 mg  2 times daily         12/25/22 1119     Place sequential compression device  Until discontinued         12/24/22 1021     IP VTE HIGH RISK PATIENT  Once         12/24/22 1021     Place sequential compression device  Until discontinued         12/22/22 2256                Discharge Planning   HUMBERTO: 12/23/2022     Code Status: Full Code   Is the patient medically ready for discharge?:     Reason for patient still in hospital  (select all that apply): Laboratory test, Treatment and Consult recommendations  Discharge Plan A: Home                  Velvet Galicia DO  Department of Hospital Medicine   [facility] [place] [facility]

## 2022-12-25 NOTE — SUBJECTIVE & OBJECTIVE
Subjective:     Interval History: 1 day status post Left foot I&D. Nurse called yesterday to relay post op bleeding after patient was WB on foot. Instructed to reinfornce dressing, and press imporantace of NWB to patient. Patient WB on foot once more later that even with less strike thru. She states she had urinated on her and needed to get up to clean herself. i urged her to request asstiance in the future. BP elevated. Afebrile. No leukocytosis, WBC continue to down trend. Wound cultures collected 12/23 speciated MRSA, patient now on contract precuations. Intraoperative cultures pending.     Follow-up For: Procedure(s) (LRB):  INCISION AND DRAINAGE, FOOT (Left)    Post-Operative Day: 1 Day Post-Op    Scheduled Meds:   ceFEPime (MAXIPIME) IVPB  2 g Intravenous Q8H    divalproex  1,250 mg Oral QHS    divalproex  500 mg Oral Daily    ferrous sulfate  1 tablet Oral Daily    fluticasone furoate-vilanteroL  1 puff Inhalation Daily    gabapentin  600 mg Oral BID    LIDOcaine  1 patch Transdermal Q24H    lisinopriL  10 mg Oral Daily    metoprolol tartrate  50 mg Oral BID    multivitamin  1 tablet Oral Daily    nicotine  1 patch Transdermal Daily    pravastatin  40 mg Oral QHS    risperiDONE  3 mg Oral BID    vancomycin (VANCOCIN) IVPB  1,750 mg Intravenous Q12H     Continuous Infusions:  PRN Meds:acetaminophen, albuterol-ipratropium, aluminum-magnesium hydroxide-simethicone, dextrose 10%, dextrose 10%, fluticasone propionate, glucagon (human recombinant), glucose, glucose, hydrOXYzine, insulin aspart U-100, melatonin, naloxone, ondansetron, sodium chloride 0.9%, sodium chloride 0.9%, sodium chloride 0.9%, traMADoL, Pharmacy to dose Vancomycin consult **AND** vancomycin - pharmacy to dose    Review of Systems   Constitutional:  Negative for fatigue and fever.   Cardiovascular:  Positive for leg swelling (resolving). Negative for chest pain.   Gastrointestinal:  Negative for abdominal pain, nausea and vomiting.    Musculoskeletal:  Positive for arthralgias and joint swelling.   Skin:  Positive for wound.   Neurological:  Negative for weakness.   Objective:     Vital Signs (Most Recent):  Temp: 98.8 °F (37.1 °C) (12/25/22 0701)  Pulse: 90 (12/25/22 0820)  Resp: 18 (12/25/22 0820)  BP: (!) 180/78 (12/25/22 0701)  SpO2: 100 % (12/25/22 0820)   Vital Signs (24h Range):  Temp:  [98 °F (36.7 °C)-98.8 °F (37.1 °C)] 98.8 °F (37.1 °C)  Pulse:  [69-90] 90  Resp:  [18-19] 18  SpO2:  [95 %-100 %] 100 %  BP: (118-180)/(58-78) 180/78     Weight: 106.6 kg (235 lb)  Body mass index is 37.93 kg/m².    Foot Exam    General  General Appearance: appears stated age and healthy   Orientation: alert and oriented to person, place, and time   Affect: appropriate       Right Foot/Ankle     Inspection and Palpation  Ecchymosis: none  Tenderness: none   Swelling: none   Skin Exam: skin intact;     Neurovascular  Dorsalis pedis: 1+  Posterior tibial: 1+  Saphenous nerve sensation: diminished  Tibial nerve sensation: diminished  Superficial peroneal nerve sensation: diminished  Deep peroneal nerve sensation: diminished  Sural nerve sensation: diminished      Left Foot/Ankle      Inspection and Palpation  Ecchymosis: none  Tenderness: none   Swelling: none (midfoot)  Skin Exam: drainage (Minimal sanginous once packing was pulled), cellulitis (continued resolution), ulcer (plantar midfoot) and erythema (continued resolution); skin not intact and no maceration     Neurovascular  Dorsalis pedis: 1+  Posterior tibial: 1+  Saphenous nerve sensation: diminished  Tibial nerve sensation: diminished  Superficial peroneal nerve sensation: diminished  Deep peroneal nerve sensation: diminished  Sural nerve sensation: diminished    Comments  Sutures clean, dry, and intact. Erythema, edema, and pain continue to resolve.No warmth, No fluctuance or crepitation, No Malodor. Surgical site with hemostasis.        Laboratory:  CBC:   Recent Labs   Lab 12/25/22  0549   WBC  10.92   RBC 3.63*   HGB 9.3*   HCT 28.5*   *   MCV 79*   MCH 25.6*   MCHC 32.6     CMP:   Recent Labs   Lab 12/23/22  0433 12/24/22  0549 12/25/22  0429   *   < > 149*   CALCIUM 8.6*   < > 8.4*   ALBUMIN 3.0*  --   --    PROT 6.6  --   --    *   < > 138   K 4.1   < > 4.8   CO2 25   < > 26      < > 104   BUN 10   < > 9   CREATININE 0.7   < > 0.6   ALKPHOS 84  --   --    ALT 7*  --   --    AST 8*  --   --    BILITOT 0.3  --   --     < > = values in this interval not displayed.     Microbiology Results (last 7 days)       Procedure Component Value Units Date/Time    Culture, Anaerobe [[id number]] Collected: 12/23/22 1115    Order Status: Completed Specimen: Wound from Foot, Left Updated: 12/25/22 1050     Anaerobic Culture Culture in progress    Aerobic culture [[id number]] Collected: 12/24/22 0933    Order Status: Completed Specimen: Bone from Foot, Left Updated: 12/25/22 1013     Aerobic Bacterial Culture No growth    Narrative:      Left Mid Foot Bone    Aerobic culture [[id number]]  (Abnormal)  (Susceptibility) Collected: 12/23/22 1115    Order Status: Completed Specimen: Wound from Foot, Left Updated: 12/25/22 0847     Aerobic Bacterial Culture METHICILLIN RESISTANT STAPHYLOCOCCUS AUREUS  Moderate  Called to [address]  12/25/2022  08:47      Gram stain [[id number]] Collected: 12/24/22 0933    Order Status: Completed Specimen: Bone from Foot, Left Updated: 12/25/22 0745     Gram Stain Result Few WBC's      No organisms seen    Narrative:      Left Mid Foot Bone    Blood culture x two cultures. Draw prior to antibiotics. [[id number]] Collected: 12/22/22 1751    Order Status: Completed Specimen: Blood from Peripheral, Antecubital, Left Updated: 12/24/22 1903     Blood Culture, Routine No Growth to date      No Growth to date      No Growth to date    Narrative:      Aerobic and anaerobic    Blood culture x two cultures. Draw prior to antibiotics. [[id number]] Collected: 12/22/22 7187     Order Status: Completed Specimen: Blood from Peripheral, Forearm, Left Updated: 12/24/22 1903     Blood Culture, Routine No Growth to date      No Growth to date      No Growth to date    Narrative:      Aerobic and anaerobic    Fungus culture [[id number]] Collected: 12/24/22 0933    Order Status: Sent Specimen: Bone from Foot, Left Updated: 12/24/22 1751    AFB Culture & Smear [[id number]] Collected: 12/24/22 0933    Order Status: Sent Specimen: Bone from Foot, Left Updated: 12/24/22 1750    Culture, Anaerobe [[id number]] Collected: 12/24/22 0933    Order Status: Sent Specimen: Bone from Foot, Left Updated: 12/24/22 1749    Gram stain [[id number]] Collected: 12/23/22 1115    Order Status: Completed Specimen: Wound from Foot, Left Updated: 12/23/22 1316     Gram Stain Result No organisms seen      No WBC's          Specimen (24h ago, onward)      None          All pertinent labs reviewed within the last 24 hours.    Diagnostic Results:  I have reviewed all pertinent imaging results/findings within the past 24 hours.    Clinical Findings:

## 2022-12-25 NOTE — PLAN OF CARE
Problem: Infection  Goal: Absence of Infection Signs and Symptoms  Outcome: Ongoing, Progressing  Intervention: Prevent or Manage Infection  Flowsheets (Taken 12/25/2022 1257)  Fever Reduction/Comfort Measures:   lightweight bedding   lightweight clothing  Infection Management: aseptic technique maintained  Isolation Precautions:   precautions initiated   contact

## 2022-12-25 NOTE — ASSESSMENT & PLAN NOTE
-Pt with erythema and wound of LLE. Follows with wound care  -Was on oral levaquin outpatient for one week without improvement  -XR left foot: Large plantar ulceration.  No acute fracture.  -Wound culture with +MRSA  -ID consulted  -Continue vancomycin day 4, cefepime day 2

## 2022-12-25 NOTE — PROGRESS NOTES
Pharmacokinetic Assessment Follow Up: IV Vancomycin    Vancomycin serum concentration assessment(s):    The trough level was drawn correctly and can be used to guide therapy at this time. The measurement is within the desired definitive target range of 10 to 20 mcg/mL.    Vancomycin Regimen Plan:    Continue regimen to Vancomycin 1750 mg IV every 12 hours with next serum trough concentration measured at 2130 prior to 4th dose on 12/26/22    Drug levels (last 3 results):  Recent Labs   Lab Result Units 12/24/22  2132   Vancomycin-Trough ug/mL 14.4       Pharmacy will continue to follow and monitor vancomycin.    Please contact pharmacy at extension [phone] for questions regarding this assessment.    Thank you for the consult,   Graham Anderson       Patient brief summary:  Audrey Natarajan is a 52 y.o. female initiated on antimicrobial therapy with IV Vancomycin for treatment of skin & soft tissue infection    The patient's current regimen is Vancomycin 1750 mg q12h    Drug Allergies:   Review of patient's allergies indicates:   Allergen Reactions    Morphine Other (See Comments)     Patient had a psychotic episode after taking Morphine  Agitation, hallucinations    Penicillins Anaphylaxis     Tolerated cephalosporins in the past    Januvia [sitagliptin] Hives    Carbamazepine Other (See Comments)     hyponatremia       Actual Body Weight:   106.6 kg    Renal Function:   Estimated Creatinine Clearance: 118.7 mL/min (based on SCr of 0.7 mg/dL).,     Dialysis Method (if applicable):  N/A    CBC (last 72 hours):  Recent Labs   Lab Result Units 12/22/22  1755 12/22/22  2322 12/23/22  0433 12/24/22  0549   WBC K/uL 17.51*  --  10.94 11.35   Hemoglobin g/dL 10.3*  --  9.6* 9.4*   Hemoglobin A1C %  --  7.1*  --   --    Hematocrit % 30.9*  --  30.2* 29.4*   Platelets K/uL 665*  --  635* 647*   Gran % % 49.1  --  53.8 42.1   Lymph % % 36.8  --  28.2 39.5   Mono % % 11.4  --  13.7 13.0   Eosinophil % % 1.7  --  3.0 4.3    Basophil % % 0.5  --  0.7 0.7   Differential Method  Automated  --  Automated Automated       Metabolic Panel (last 72 hours):  Recent Labs   Lab Result Units 12/22/22  1755 12/22/22  2030 12/23/22  0433 12/24/22  0549   Sodium mmol/L 137  --  135* 134*   Potassium mmol/L 4.2  --  4.1 4.4   Chloride mmol/L 98  --  101 101   CO2 mmol/L 23  --  25 24   Glucose mg/dL 95  --  145* 122*   Glucose, UA   --  Negative  --   --    BUN mg/dL 11  --  10 11   Creatinine mg/dL 0.7  --  0.7 0.7   Albumin g/dL 3.1*  --  3.0*  --    Total Bilirubin mg/dL 0.3  --  0.3  --    Alkaline Phosphatase U/L 88  --  84  --    AST U/L 6*  --  8*  --    ALT U/L 7*  --  7*  --    Magnesium mg/dL 1.1*  --  1.4* 1.9   Phosphorus mg/dL 2.7  --  3.0  --        Vancomycin Administrations:  vancomycin given in the last 96 hours                     vancomycin (VANCOCIN) 1,750 mg in dextrose 5 % 500 mL IVPB (mg) 1,750 mg New Bag 12/24/22 2230     1,750 mg New Bag  1104     1,750 mg New Bag 12/23/22 2224    vancomycin (VANCOCIN) 2,000 mg in dextrose 5 % 500 mL IVPB (mg) 2,000 mg New Bag 12/22/22 2027                    Microbiologic Results:  Microbiology Results (last 7 days)       Procedure Component Value Units Date/Time    Blood culture x two cultures. Draw prior to antibiotics. [[id number]] Collected: 12/22/22 1751    Order Status: Completed Specimen: Blood from Peripheral, Antecubital, Left Updated: 12/24/22 1903     Blood Culture, Routine No Growth to date      No Growth to date      No Growth to date    Narrative:      Aerobic and anaerobic    Blood culture x two cultures. Draw prior to antibiotics. [[id number]] Collected: 12/22/22 1743    Order Status: Completed Specimen: Blood from Peripheral, Forearm, Left Updated: 12/24/22 1903     Blood Culture, Routine No Growth to date      No Growth to date      No Growth to date    Narrative:      Aerobic and anaerobic    Fungus culture [[id number]] Collected: 12/24/22 0933    Order Status: Sent  Specimen: Bone from Foot, Left Updated: 12/24/22 1751    AFB Culture & Smear [[id number]] Collected: 12/24/22 0933    Order Status: Sent Specimen: Bone from Foot, Left Updated: 12/24/22 1750    Gram stain [[id number]] Collected: 12/24/22 0933    Order Status: Sent Specimen: Bone from Foot, Left Updated: 12/24/22 1750    Aerobic culture [[id number]] Collected: 12/24/22 0933    Order Status: Sent Specimen: Bone from Foot, Left Updated: 12/24/22 1750    Culture, Anaerobe [[id number]] Collected: 12/24/22 0933    Order Status: Sent Specimen: Bone from Foot, Left Updated: 12/24/22 1749    Aerobic culture [[id number]]  (Abnormal) Collected: 12/23/22 1115    Order Status: Completed Specimen: Wound from Foot, Left Updated: 12/24/22 1132     Aerobic Bacterial Culture STAPHYLOCOCCUS AUREUS  Moderate  Susceptibility pending      Gram stain [[id number]] Collected: 12/23/22 1115    Order Status: Completed Specimen: Wound from Foot, Left Updated: 12/23/22 1316     Gram Stain Result No organisms seen      No WBC's    Culture, Anaerobe [[id number]] Collected: 12/23/22 1115    Order Status: Sent Specimen: Wound from Foot, Left Updated: 12/23/22 1158

## 2022-12-25 NOTE — PROGRESS NOTES
[facility]  Podiatry  Progress Note    Patient Name: Audrey Natarajan  MRN: [id number]  Admission Date: 12/22/2022  Hospital Length of Stay: 0 days  Attending Physician: Velvet Galicia DO  Primary Care Provider: Donaldo Pena MD     Subjective:     Interval History: 1 day status post Left foot I&D. Nurse called yesterday to relay post op bleeding after patient was WB on foot. Instructed to reinfornce dressing, and press imporantace of NWB to patient. Patient WB on foot once more later that even with less strike thru. She states she had urinated on her and needed to get up to clean herself. i urged her to request asstiance in the future. BP elevated. Afebrile. No leukocytosis, WBC continue to down trend. Wound cultures collected 12/23 speciated MRSA, patient now on contract precuations. Intraoperative cultures pending.     Follow-up For: Procedure(s) (LRB):  INCISION AND DRAINAGE, FOOT (Left)    Post-Operative Day: 1 Day Post-Op    Scheduled Meds:   ceFEPime (MAXIPIME) IVPB  2 g Intravenous Q8H    divalproex  1,250 mg Oral QHS    divalproex  500 mg Oral Daily    ferrous sulfate  1 tablet Oral Daily    fluticasone furoate-vilanteroL  1 puff Inhalation Daily    gabapentin  600 mg Oral BID    LIDOcaine  1 patch Transdermal Q24H    lisinopriL  10 mg Oral Daily    metoprolol tartrate  50 mg Oral BID    multivitamin  1 tablet Oral Daily    nicotine  1 patch Transdermal Daily    pravastatin  40 mg Oral QHS    risperiDONE  3 mg Oral BID    vancomycin (VANCOCIN) IVPB  1,750 mg Intravenous Q12H     Continuous Infusions:  PRN Meds:acetaminophen, albuterol-ipratropium, aluminum-magnesium hydroxide-simethicone, dextrose 10%, dextrose 10%, fluticasone propionate, glucagon (human recombinant), glucose, glucose, hydrOXYzine, insulin aspart U-100, melatonin, naloxone, ondansetron, sodium chloride 0.9%, sodium chloride 0.9%, sodium chloride 0.9%, traMADoL, Pharmacy to dose Vancomycin consult **AND**  vancomycin - pharmacy to dose    Review of Systems   Constitutional:  Negative for fatigue and fever.   Cardiovascular:  Positive for leg swelling (resolving). Negative for chest pain.   Gastrointestinal:  Negative for abdominal pain, nausea and vomiting.   Musculoskeletal:  Positive for arthralgias and joint swelling.   Skin:  Positive for wound.   Neurological:  Negative for weakness.   Objective:     Vital Signs (Most Recent):  Temp: 98.8 °F (37.1 °C) (12/25/22 0701)  Pulse: 90 (12/25/22 0820)  Resp: 18 (12/25/22 0820)  BP: (!) 180/78 (12/25/22 0701)  SpO2: 100 % (12/25/22 0820)   Vital Signs (24h Range):  Temp:  [98 °F (36.7 °C)-98.8 °F (37.1 °C)] 98.8 °F (37.1 °C)  Pulse:  [69-90] 90  Resp:  [18-19] 18  SpO2:  [95 %-100 %] 100 %  BP: (118-180)/(58-78) 180/78     Weight: 106.6 kg (235 lb)  Body mass index is 37.93 kg/m².    Foot Exam    General  General Appearance: appears stated age and healthy   Orientation: alert and oriented to person, place, and time   Affect: appropriate       Right Foot/Ankle     Inspection and Palpation  Ecchymosis: none  Tenderness: none   Swelling: none   Skin Exam: skin intact;     Neurovascular  Dorsalis pedis: 1+  Posterior tibial: 1+  Saphenous nerve sensation: diminished  Tibial nerve sensation: diminished  Superficial peroneal nerve sensation: diminished  Deep peroneal nerve sensation: diminished  Sural nerve sensation: diminished      Left Foot/Ankle      Inspection and Palpation  Ecchymosis: none  Tenderness: none   Swelling: none (midfoot)  Skin Exam: drainage (Minimal sanginous once packing was pulled), cellulitis (continued resolution), ulcer (plantar midfoot) and erythema (continued resolution); skin not intact and no maceration     Neurovascular  Dorsalis pedis: 1+  Posterior tibial: 1+  Saphenous nerve sensation: diminished  Tibial nerve sensation: diminished  Superficial peroneal nerve sensation: diminished  Deep peroneal nerve sensation: diminished  Sural nerve  sensation: diminished    Comments  Sutures clean, dry, and intact. Erythema, edema, and pain continue to resolve.No warmth, No fluctuance or crepitation, No Malodor. Surgical site with hemostasis.        Laboratory:  CBC:   Recent Labs   Lab 12/25/22  0549   WBC 10.92   RBC 3.63*   HGB 9.3*   HCT 28.5*   *   MCV 79*   MCH 25.6*   MCHC 32.6     CMP:   Recent Labs   Lab 12/23/22  0433 12/24/22  0549 12/25/22  0429   *   < > 149*   CALCIUM 8.6*   < > 8.4*   ALBUMIN 3.0*  --   --    PROT 6.6  --   --    *   < > 138   K 4.1   < > 4.8   CO2 25   < > 26      < > 104   BUN 10   < > 9   CREATININE 0.7   < > 0.6   ALKPHOS 84  --   --    ALT 7*  --   --    AST 8*  --   --    BILITOT 0.3  --   --     < > = values in this interval not displayed.     Microbiology Results (last 7 days)       Procedure Component Value Units Date/Time    Culture, Anaerobe [[id number]] Collected: 12/23/22 1115    Order Status: Completed Specimen: Wound from Foot, Left Updated: 12/25/22 1050     Anaerobic Culture Culture in progress    Aerobic culture [[id number]] Collected: 12/24/22 0933    Order Status: Completed Specimen: Bone from Foot, Left Updated: 12/25/22 1013     Aerobic Bacterial Culture No growth    Narrative:      Left Mid Foot Bone    Aerobic culture [[id number]]  (Abnormal)  (Susceptibility) Collected: 12/23/22 1115    Order Status: Completed Specimen: Wound from Foot, Left Updated: 12/25/22 0847     Aerobic Bacterial Culture METHICILLIN RESISTANT STAPHYLOCOCCUS AUREUS  Moderate  Called to [id number]  12/25/2022  08:47      Gram stain [[id number]] Collected: 12/24/22 0933    Order Status: Completed Specimen: Bone from Foot, Left Updated: 12/25/22 0745     Gram Stain Result Few WBC's      No organisms seen    Narrative:      Left Mid Foot Bone    Blood culture x two cultures. Draw prior to antibiotics. [[id number]] Collected: 12/22/22 2021    Order Status: Completed Specimen: Blood from Peripheral,  Antecubital, Left Updated: 12/24/22 1903     Blood Culture, Routine No Growth to date      No Growth to date      No Growth to date    Narrative:      Aerobic and anaerobic    Blood culture x two cultures. Draw prior to antibiotics. [[id number]] Collected: 12/22/22 1743    Order Status: Completed Specimen: Blood from Peripheral, Forearm, Left Updated: 12/24/22 1903     Blood Culture, Routine No Growth to date      No Growth to date      No Growth to date    Narrative:      Aerobic and anaerobic    Fungus culture [[id number]] Collected: 12/24/22 0933    Order Status: Sent Specimen: Bone from Foot, Left Updated: 12/24/22 1751    AFB Culture & Smear [[id number]] Collected: 12/24/22 0933    Order Status: Sent Specimen: Bone from Foot, Left Updated: 12/24/22 1750    Culture, Anaerobe [[id number]] Collected: 12/24/22 0933    Order Status: Sent Specimen: Bone from Foot, Left Updated: 12/24/22 1749    Gram stain [[id number]] Collected: 12/23/22 1115    Order Status: Completed Specimen: Wound from Foot, Left Updated: 12/23/22 1316     Gram Stain Result No organisms seen      No WBC's          Specimen (24h ago, onward)      None          All pertinent labs reviewed within the last 24 hours.    Diagnostic Results:  I have reviewed all pertinent imaging results/findings within the past 24 hours.    Clinical Findings:        Assessment/Plan:     * Cellulitis of left foot  IDSA moderate diabetic foot infection. 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 SSTI/cellulitis appeared to be resolving significantly on 1st encounter 12/24 as well as a large down trend in WBC to WNL on Abx. MRI read phlegmon and abscess, with possible osteomyelitis. Patient is now S/P L foot I&D without significant nonviable tissue and no abscess noted intraoperatively. Bone culture of medal cuneiform adjacent to ulceration was obtained intraoperatively, currently pending. Intraoperative  Deep wound swab culture pending. Wound culture obtained by house staff 12/23 speciated MRSA and patient is on contact precautions with ID on board.     - Hemostasis of surgical wound achieved, okay to restart Anticoags   - Continue ABx per ID, cellulitis will require at least 10-14 days of coverage, additional coverage may be indicated pending cultures and ID recs.   - Patient needs to remain NWB on Left foot. She has a knee scooter bedside as well as a commode and wheel chair in her home.   - Patient wound likely benefit from NPWT wound vac in the near future, will defer to house staff   - Dressing changed per podiatry, wound packing and DSD.   - Once ABx plan is in place patient is okay to discharge from podiatry perspective   - Patient will require diligent wound care and aggressive offloading of the left foot   - Nursing wound care routine placed     FUTURE Discharge recommendations:   - Before discharge please setup an ambulatory appointment to follow up with Dr. Velasquez 2 weeks after discharge at [place] [facility], as he is well versed with charcot foot and may potentially be able to offer charcot reconstruction in the distant future once concerns of infection are null and ulceration is healed.   - L foot NWB, R foot WBAT   - Wound care instructions: If wound vac is placed, set on continuous 100 mmHG, black foam over wound, wound will need to be cleansed with vashe/saline and changed by HH every 72 hours, and secured with cast padding x 1 and secured with ace. If dressings are utilized, continue packing with iodoform and gradually wean down PRN followed with 4x4s, cast padding x 2, secured with ace.           Ariel Szymanski DPM, PGY-2  Podiatry / Foot and Ankle Surgery   Page # [phone]  Secure chat preferred

## 2022-12-25 NOTE — ASSESSMENT & PLAN NOTE
-US arterial from 05/2022: Sonogram suggest hemodynamically significant stenosis in the left and DPA. Multifocal mild to moderate grade stenoses is suspected throughout the left MIRACLE and, bilateral posterior tibial and peroneal arteries.  - Resume home eliquis, aspirin on 12/25. Okay per podiatry

## 2022-12-25 NOTE — ASSESSMENT & PLAN NOTE
IDSA moderate diabetic foot infection. 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 SSTI/cellulitis appeared to be resolving significantly on 1st encounter 12/24 as well as a large down trend in WBC to WNL on Abx. MRI read phlegmon and abscess, with possible osteomyelitis. Patient is now S/P L foot I&D without significant nonviable tissue and no abscess noted intraoperatively. Bone culture of medal cuneiform adjacent to ulceration was obtained intraoperatively, currently pending. Intraoperative Deep wound swab culture pending. Wound culture obtained by house staff 12/23 speciated MRSA and patient is on contact precautions with ID on board.     - Hemostasis of surgical wound achieved, okay to restart Anticoags   - Continue ABx per ID, cellulitis will require at least 10-14 days of coverage, additional coverage may be indicated pending cultures and ID recs.   - Patient needs to remain NWB on Left foot. She has a knee scooter bedside as well as a commode and wheel chair in her home.   - Patient wound likely benefit from NPWT wound vac in the near future, will defer to house staff   - Dressing changed per podiatry, wound packing and DSD.   - Once ABx plan is in place patient is okay to discharge from podiatry perspective   - Patient will require diligent wound care and aggressive offloading of the left foot   - Nursing wound care routine placed     FUTURE Discharge recommendations:   - Before discharge please setup an ambulatory appointment to follow up with Dr. Velasquez 2 weeks after discharge at [facility], as he is well versed with charcot foot and may potentially be able to offer charcot reconstruction in the distant future once concerns of infection are null and ulceration is healed.   - L foot NWB, R foot WBAT   - Wound care instructions: If wound vac is placed, set on continuous 100 mmHG, black foam over wound, wound will need to be  cleansed with vashe/saline and changed by HH every 72 hours, and secured with cast padding x 1 and secured with ace. If dressings are utilized, continue packing with iodoform and gradually wean down PRN followed with 4x4s, cast padding x 2, secured with ace.

## 2022-12-25 NOTE — SUBJECTIVE & OBJECTIVE
Interval History:  No acute overnight events.  Patient remained afebrile.  Pain is currently controlled.  Denies any nausea or vomiting.  Tolerating IV antibiotics    Review of Systems   Constitutional:  Negative for fatigue and fever.   Cardiovascular:  Positive for leg swelling (left foot swelling). Negative for chest pain.   Gastrointestinal:  Negative for abdominal pain, nausea and vomiting.   Musculoskeletal:  Positive for arthralgias and joint swelling.   Skin:  Positive for rash and wound.   Neurological:  Negative for weakness.     Objective:     Vital Signs (Most Recent):  Temp: 98 °F (36.7 °C) (12/25/22 1111)  Pulse: 71 (12/25/22 1111)  Resp: 17 (12/25/22 1111)  BP: (!) 165/71 (12/25/22 1111)  SpO2: 97 % (12/25/22 1111)   Vital Signs (24h Range):  Temp:  [98 °F (36.7 °C)-98.8 °F (37.1 °C)] 98 °F (36.7 °C)  Pulse:  [69-90] 71  Resp:  [17-19] 17  SpO2:  [95 %-100 %] 97 %  BP: (118-180)/(58-78) 165/71     Weight: 106.6 kg (235 lb)  Body mass index is 37.93 kg/m².    Intake/Output Summary (Last 24 hours) at 12/25/2022 1114  Last data filed at 12/25/2022 0814  Gross per 24 hour   Intake 600 ml   Output 1850 ml   Net -1250 ml        Physical Exam  Vitals and nursing note reviewed.   Constitutional:       General: She is not in acute distress.     Appearance: She is obese. She is not ill-appearing.   HENT:      Mouth/Throat:      Mouth: Mucous membranes are moist.   Eyes:      Extraocular Movements: Extraocular movements intact.   Cardiovascular:      Rate and Rhythm: Normal rate and regular rhythm.      Heart sounds: No murmur heard.    No gallop.   Pulmonary:      Effort: Pulmonary effort is normal. No respiratory distress.      Breath sounds: Normal breath sounds. No wheezing.   Musculoskeletal:         General: Swelling and deformity (left charcot foot) present. No tenderness.      Comments: Left foot wrapped    Skin:     General: Skin is warm and dry.      Findings: Erythema present.      Comments: MATEUS  erythema - improving per patient. No bruising. L foot wrapped    Neurological:      General: No focal deficit present.      Mental Status: She is alert and oriented to person, place, and time.   Psychiatric:         Mood and Affect: Mood normal.         Thought Content: Thought content normal.       Significant Labs: All pertinent labs within the past 24 hours have been reviewed.    Significant Imaging: I have reviewed all pertinent imaging results/findings within the past 24 hours.

## 2022-12-25 NOTE — ASSESSMENT & PLAN NOTE
-MRI left foot: Large infectious/inflammatory phlegmon at the plantar aspect of the midfoot with multiple small interconnecting abscesses. Advanced Charcot arthropathy throughout the midfoot and probably hindfoot.  Given the proximity to the ulcer and inflammatory phlegmon, early septic arthritis and osteomyelitis cannot be excluded.   -Blood cx with NGTD   -Wound culture with +MRSA. Will likely need long term abx  -podiatry consulted: s/p incision and drainage on 12/24  -infectious disease consulted  -continue IV vancomycin and cefepime at this time

## 2022-12-26 LAB
BACTERIA BLD CULT: NORMAL
BACTERIA BLD CULT: NORMAL
POCT GLUCOSE: 154 MG/DL (ref 70–110)
POCT GLUCOSE: 255 MG/DL (ref 70–110)
POCT GLUCOSE: 286 MG/DL (ref 70–110)
POCT GLUCOSE: 307 MG/DL (ref 70–110)
VANCOMYCIN TROUGH SERPL-MCNC: 15.3 UG/ML (ref 10–22)

## 2022-12-26 PROCEDURE — 25000003 PHARM REV CODE 250: Performed by: NURSE PRACTITIONER

## 2022-12-26 PROCEDURE — 63600175 PHARM REV CODE 636 W HCPCS: Performed by: EMERGENCY MEDICINE

## 2022-12-26 PROCEDURE — 27000207 HC ISOLATION

## 2022-12-26 PROCEDURE — 25000003 PHARM REV CODE 250: Performed by: STUDENT IN AN ORGANIZED HEALTH CARE EDUCATION/TRAINING PROGRAM

## 2022-12-26 PROCEDURE — 25000003 PHARM REV CODE 250: Performed by: EMERGENCY MEDICINE

## 2022-12-26 PROCEDURE — 80202 ASSAY OF VANCOMYCIN: CPT | Performed by: STUDENT IN AN ORGANIZED HEALTH CARE EDUCATION/TRAINING PROGRAM

## 2022-12-26 PROCEDURE — S4991 NICOTINE PATCH NONLEGEND: HCPCS | Performed by: STUDENT IN AN ORGANIZED HEALTH CARE EDUCATION/TRAINING PROGRAM

## 2022-12-26 PROCEDURE — 63600175 PHARM REV CODE 636 W HCPCS: Performed by: STUDENT IN AN ORGANIZED HEALTH CARE EDUCATION/TRAINING PROGRAM

## 2022-12-26 PROCEDURE — 36415 COLL VENOUS BLD VENIPUNCTURE: CPT | Performed by: STUDENT IN AN ORGANIZED HEALTH CARE EDUCATION/TRAINING PROGRAM

## 2022-12-26 PROCEDURE — 63600175 PHARM REV CODE 636 W HCPCS: Performed by: NURSE PRACTITIONER

## 2022-12-26 PROCEDURE — 11000001 HC ACUTE MED/SURG PRIVATE ROOM

## 2022-12-26 PROCEDURE — 94640 AIRWAY INHALATION TREATMENT: CPT

## 2022-12-26 PROCEDURE — 25000242 PHARM REV CODE 250 ALT 637 W/ HCPCS: Performed by: NURSE PRACTITIONER

## 2022-12-26 RX ORDER — FLUCONAZOLE 150 MG/1
150 TABLET ORAL ONCE
Status: COMPLETED | OUTPATIENT
Start: 2022-12-26 | End: 2022-12-26

## 2022-12-26 RX ADMIN — TRAMADOL HYDROCHLORIDE 50 MG: 50 TABLET, COATED ORAL at 11:12

## 2022-12-26 RX ADMIN — APIXABAN 5 MG: 5 TABLET, FILM COATED ORAL at 08:12

## 2022-12-26 RX ADMIN — CEFEPIME 2 G: 2 INJECTION, POWDER, FOR SOLUTION INTRAVENOUS at 01:12

## 2022-12-26 RX ADMIN — GABAPENTIN 600 MG: 300 CAPSULE ORAL at 08:12

## 2022-12-26 RX ADMIN — TRAMADOL HYDROCHLORIDE 50 MG: 50 TABLET, COATED ORAL at 06:12

## 2022-12-26 RX ADMIN — FLUTICASONE PROPIONATE 100 MCG: 50 SPRAY, METERED NASAL at 10:12

## 2022-12-26 RX ADMIN — VANCOMYCIN HYDROCHLORIDE 1750 MG: 500 INJECTION, POWDER, LYOPHILIZED, FOR SOLUTION INTRAVENOUS at 10:12

## 2022-12-26 RX ADMIN — METOPROLOL TARTRATE 50 MG: 50 TABLET, FILM COATED ORAL at 08:12

## 2022-12-26 RX ADMIN — INSULIN ASPART 3 UNITS: 100 INJECTION, SOLUTION INTRAVENOUS; SUBCUTANEOUS at 12:12

## 2022-12-26 RX ADMIN — Medication 1 PATCH: at 09:12

## 2022-12-26 RX ADMIN — DIVALPROEX SODIUM 1250 MG: 250 TABLET, DELAYED RELEASE ORAL at 08:12

## 2022-12-26 RX ADMIN — ONDANSETRON 4 MG: 2 INJECTION INTRAMUSCULAR; INTRAVENOUS at 01:12

## 2022-12-26 RX ADMIN — THERA TABS 1 TABLET: TAB at 08:12

## 2022-12-26 RX ADMIN — FLUTICASONE FUROATE AND VILANTEROL TRIFENATATE 1 PUFF: 100; 25 POWDER RESPIRATORY (INHALATION) at 06:12

## 2022-12-26 RX ADMIN — INSULIN ASPART 1 UNITS: 100 INJECTION, SOLUTION INTRAVENOUS; SUBCUTANEOUS at 08:12

## 2022-12-26 RX ADMIN — CEFEPIME 2 G: 2 INJECTION, POWDER, FOR SOLUTION INTRAVENOUS at 08:12

## 2022-12-26 RX ADMIN — TRAMADOL HYDROCHLORIDE 50 MG: 50 TABLET, COATED ORAL at 04:12

## 2022-12-26 RX ADMIN — LISINOPRIL 10 MG: 5 TABLET ORAL at 08:12

## 2022-12-26 RX ADMIN — RISPERIDONE 3 MG: 1 TABLET ORAL at 08:12

## 2022-12-26 RX ADMIN — ASPIRIN 81 MG: 81 TABLET, COATED ORAL at 08:12

## 2022-12-26 RX ADMIN — FERROUS SULFATE TAB 325 MG (65 MG ELEMENTAL FE) 1 EACH: 325 (65 FE) TAB at 08:12

## 2022-12-26 RX ADMIN — PRAVASTATIN SODIUM 40 MG: 40 TABLET ORAL at 08:12

## 2022-12-26 RX ADMIN — DIVALPROEX SODIUM 500 MG: 250 TABLET, DELAYED RELEASE ORAL at 08:12

## 2022-12-26 RX ADMIN — Medication: at 06:12

## 2022-12-26 RX ADMIN — ACETAMINOPHEN 650 MG: 325 TABLET ORAL at 08:12

## 2022-12-26 RX ADMIN — LIDOCAINE 1 PATCH: 50 PATCH TOPICAL at 06:12

## 2022-12-26 RX ADMIN — FLUCONAZOLE 150 MG: 150 TABLET ORAL at 04:12

## 2022-12-26 RX ADMIN — CEFEPIME 2 G: 2 INJECTION, POWDER, FOR SOLUTION INTRAVENOUS at 04:12

## 2022-12-26 RX ADMIN — INSULIN ASPART 4 UNITS: 100 INJECTION, SOLUTION INTRAVENOUS; SUBCUTANEOUS at 04:12

## 2022-12-26 NOTE — PLAN OF CARE
Problem: Adult Inpatient Plan of Care  Goal: Plan of Care Review  Outcome: Ongoing, Progressing  Goal: Patient-Specific Goal (Individualized)  Outcome: Ongoing, Progressing  Goal: Absence of Hospital-Acquired Illness or Injury  Outcome: Ongoing, Progressing  Goal: Optimal Comfort and Wellbeing  Outcome: Ongoing, Progressing  Goal: Readiness for Transition of Care  Outcome: Ongoing, Progressing     Problem: Impaired Wound Healing  Goal: Optimal Wound Healing  Outcome: Ongoing, Progressing     Problem: Diabetes Comorbidity  Goal: Blood Glucose Level Within Targeted Range  Outcome: Ongoing, Progressing     Problem: Skin Injury Risk Increased  Goal: Skin Health and Integrity  Outcome: Ongoing, Progressing     Problem: Infection  Goal: Absence of Infection Signs and Symptoms  Outcome: Ongoing, Progressing     Problem: Fall Injury Risk  Goal: Absence of Fall and Fall-Related Injury  Outcome: Ongoing, Progressing

## 2022-12-26 NOTE — PLAN OF CARE
Chart reviewed - cx data  (from L wound swab) MRSA thus far, intraop cx/path in process. Pt remains afebrile and without leukocytosis.     Recommendations:  -continue vancomycin pharmacy to dose and cefepime pending intraop cx  -follow up path  -contact precautions per hospital protocol

## 2022-12-26 NOTE — ASSESSMENT & PLAN NOTE
-MRI left foot: Large infectious/inflammatory phlegmon at the plantar aspect of the midfoot with multiple small interconnecting abscesses. Advanced Charcot arthropathy throughout the midfoot and probably hindfoot.  Given the proximity to the ulcer and inflammatory phlegmon, early septic arthritis and osteomyelitis cannot be excluded.   -Blood cx with NGTD   -Wound culture with +MRSA. Will likely need long term abx  -podiatry consulted: s/p incision and drainage on 12/24  -infectious disease consulted: awaiting intraop cx/path final result   -continue IV vancomycin and cefepime at this time

## 2022-12-26 NOTE — PROGRESS NOTES
Awaiting Vanc trough lab draw and result, once timed and due 12/26 @2130. Patient serum creatinine is stable, 0.6.  Will continue to follow and monitor.  Gio, pharmacist

## 2022-12-26 NOTE — SUBJECTIVE & OBJECTIVE
Interval History:  No acute overnight events.  Patient remained afebrile.  Pain is currently controlled.  Denies any nausea or vomiting.  Tolerating IV antibiotics. Want to consider IV abx at home. Patient is not a good candidate for home IV infusion     Review of Systems   Constitutional:  Negative for fatigue and fever.   Cardiovascular:  Positive for leg swelling (left foot swelling). Negative for chest pain.   Gastrointestinal:  Negative for abdominal pain, nausea and vomiting.   Musculoskeletal:  Positive for arthralgias and joint swelling.   Skin:  Positive for rash and wound.   Neurological:  Negative for weakness.     Objective:     Vital Signs (Most Recent):  Temp: 98.3 °F (36.8 °C) (12/26/22 0725)  Pulse: 88 (12/26/22 0725)  Resp: 20 (12/26/22 0725)  BP: (!) 159/84 (12/26/22 0725)  SpO2: 97 % (12/26/22 0725)   Vital Signs (24h Range):  Temp:  [98.1 °F (36.7 °C)-98.9 °F (37.2 °C)] 98.3 °F (36.8 °C)  Pulse:  [70-88] 88  Resp:  [17-20] 20  SpO2:  [95 %-99 %] 97 %  BP: (131-159)/(62-84) 159/84     Weight: 106.6 kg (235 lb)  Body mass index is 37.93 kg/m².    Intake/Output Summary (Last 24 hours) at 12/26/2022 1136  Last data filed at 12/26/2022 0953  Gross per 24 hour   Intake 600 ml   Output 2480 ml   Net -1880 ml        Physical Exam  Vitals and nursing note reviewed.   Constitutional:       General: She is not in acute distress.     Appearance: She is obese. She is not ill-appearing.   HENT:      Mouth/Throat:      Mouth: Mucous membranes are moist.   Eyes:      Extraocular Movements: Extraocular movements intact.   Cardiovascular:      Rate and Rhythm: Normal rate and regular rhythm.      Heart sounds: No murmur heard.    No gallop.   Pulmonary:      Effort: Pulmonary effort is normal. No respiratory distress.      Breath sounds: Normal breath sounds. No wheezing.   Musculoskeletal:         General: Swelling and deformity (left charcot foot) present. No tenderness.      Comments: Left foot wrapped     Skin:     General: Skin is warm and dry.      Findings: Erythema present.      Comments: LLE erythema - improving per patient. No bruising. L foot wrapped    Neurological:      General: No focal deficit present.      Mental Status: She is alert and oriented to person, place, and time.   Psychiatric:         Mood and Affect: Mood normal.         Thought Content: Thought content normal.       Significant Labs: All pertinent labs within the past 24 hours have been reviewed.    Significant Imaging: I have reviewed all pertinent imaging results/findings within the past 24 hours.

## 2022-12-26 NOTE — PROGRESS NOTES
[facility]  Progress Note    Patient Name: Audrey Natarajan  MRN: [id number]  Patient Class: IP- Inpatient   Admission Date: 12/22/2022  Length of Stay: 1 days  Attending Physician: Velvet Galicia DO  Primary Care Provider: Donaldo Pena MD        Subjective:     Principal Problem:Cellulitis of left foot        HPI:  52 y.o. female with PMH of DM2, Charcot's foot, osteomyelitis of the foot, diabetic foot ulcer, b/l LE DVTs (on Eliquis), afib on eliquis and has a nelly filter, MRSA, PAD, h/o PE, COPD, HTN, Bipolar I, tobacco abuse, who presented presents to the emergency department for evaluation of chronic wound to her left foot.  Per wound care note pt is currently on oral abx. Does not feels well, was concerned regarding another potential clot and wound infection. ED workup revealed WBCs 17.51, H/H 10.3/30.9, Sed rate elevated, CRP elevated, blood cultures pending, chest x-ray showed improved interstitial attenuation, residual edema is not excluded.  No large focal consolidation.  Left foot x-ray showed large plantar ulceration.  No acute fracture.  Patient being admitted to hospital medicine for further medical management with podiatry and ortho consults.      Overview/Hospital Course:  52 y.o. female with PMH of DM2, Charcot's foot, osteomyelitis of the foot, diabetic foot ulcer, b/l LE DVTs (on Eliquis), afib on eliquis and has a nelly filter, MRSA, PAD, h/o PE, COPD, HTN, Bipolar I, tobacco abuse admitted to observation on 12/22/2022 for LLE wound and cellulitis. Was sent here from wound care clinic for further evaluation. XR left foot with Large plantar ulceration.  No acute fracture. Patient was started on broad spectrum antibiotics. Of note, patient was on oral levaquin for one week prior to admission. MRI left foot with Large infectious/inflammatory phlegmon at the plantar aspect of the midfoot with multiple small interconnecting abscesses Podiatry consulted-  s/p incision and drainage on 12/24. No seamus purulence, no drainable deep Abcess, but noted extensive phlegmon.   Blood cx with NGTD. Pathology pending. Wound culture with MRSA. Continue broad spectrum abx at this time. ID consulted. Awaiting on final intraop cx/path           Interval History:  No acute overnight events.  Patient remained afebrile.  Pain is currently controlled.  Denies any nausea or vomiting.  Tolerating IV antibiotics. Want to consider IV abx at home. Patient is not a good candidate for home IV infusion     Review of Systems   Constitutional:  Negative for fatigue and fever.   Cardiovascular:  Positive for leg swelling (left foot swelling). Negative for chest pain.   Gastrointestinal:  Negative for abdominal pain, nausea and vomiting.   Musculoskeletal:  Positive for arthralgias and joint swelling.   Skin:  Positive for rash and wound.   Neurological:  Negative for weakness.     Objective:     Vital Signs (Most Recent):  Temp: 98.3 °F (36.8 °C) (12/26/22 0725)  Pulse: 88 (12/26/22 0725)  Resp: 20 (12/26/22 0725)  BP: (!) 159/84 (12/26/22 0725)  SpO2: 97 % (12/26/22 0725)   Vital Signs (24h Range):  Temp:  [98.1 °F (36.7 °C)-98.9 °F (37.2 °C)] 98.3 °F (36.8 °C)  Pulse:  [70-88] 88  Resp:  [17-20] 20  SpO2:  [95 %-99 %] 97 %  BP: (131-159)/(62-84) 159/84     Weight: 106.6 kg (235 lb)  Body mass index is 37.93 kg/m².    Intake/Output Summary (Last 24 hours) at 12/26/2022 1136  Last data filed at 12/26/2022 0953  Gross per 24 hour   Intake 600 ml   Output 2480 ml   Net -1880 ml        Physical Exam  Vitals and nursing note reviewed.   Constitutional:       General: She is not in acute distress.     Appearance: She is obese. She is not ill-appearing.   HENT:      Mouth/Throat:      Mouth: Mucous membranes are moist.   Eyes:      Extraocular Movements: Extraocular movements intact.   Cardiovascular:      Rate and Rhythm: Normal rate and regular rhythm.      Heart sounds: No murmur heard.    No gallop.    Pulmonary:      Effort: Pulmonary effort is normal. No respiratory distress.      Breath sounds: Normal breath sounds. No wheezing.   Musculoskeletal:         General: Swelling and deformity (left charcot foot) present. No tenderness.      Comments: Left foot wrapped    Skin:     General: Skin is warm and dry.      Findings: Erythema present.      Comments: LLE erythema - improving per patient. No bruising. L foot wrapped    Neurological:      General: No focal deficit present.      Mental Status: She is alert and oriented to person, place, and time.   Psychiatric:         Mood and Affect: Mood normal.         Thought Content: Thought content normal.       Significant Labs: All pertinent labs within the past 24 hours have been reviewed.    Significant Imaging: I have reviewed all pertinent imaging results/findings within the past 24 hours.      Assessment/Plan:      * Cellulitis of left foot  -Pt with erythema and wound of LLE. Follows with wound care  -Was on oral levaquin outpatient for one week without improvement  -XR left foot: Large plantar ulceration.  No acute fracture.  -Wound culture with +MRSA  -ID consulted: awaiting final intraop cx/path   -Continue vancomycin day 5, cefepime day 3      Open wound of left foot  -MRI left foot: Large infectious/inflammatory phlegmon at the plantar aspect of the midfoot with multiple small interconnecting abscesses. Advanced Charcot arthropathy throughout the midfoot and probably hindfoot.  Given the proximity to the ulcer and inflammatory phlegmon, early septic arthritis and osteomyelitis cannot be excluded.   -Blood cx with NGTD   -Wound culture with +MRSA. Will likely need long term abx  -podiatry consulted: s/p incision and drainage on 12/24  -infectious disease consulted: awaiting intraop cx/path final result   -continue IV vancomycin and cefepime at this time      Charcot's joint of left foot  -Chronic  -Podiatry following     Type II diabetes mellitus with  neurological manifestations  A1c:   Lab Results   Component Value Date    HGBA1C 7.1 (H) 12/22/2022     Meds:  SSI PRN to maintain goal 140-180  ADA diet, accuchecks ACHS, hypoglycemic protocol      Essential hypertension  -Continue home meds lisinopril, metoprolol tartrate    Hyperlipidemia  continue statin      PAD (peripheral artery disease)  -US arterial from 05/2022: Sonogram suggest hemodynamically significant stenosis in the left and DPA. Multifocal mild to moderate grade stenoses is suspected throughout the left MIRACLE and, bilateral posterior tibial and peroneal arteries.  - Resume home eliquis, aspirin on 12/25. Okay per podiatry     Anemia  Chronic, stable  Iron profile with low iron and low iron sats  PO iron supplement       Thrombocytosis  -chronic   - stable, appears near baseline   - Continue ASA 81 Mg QD. Okay per podiatry     COPD (chronic obstructive pulmonary disease)  Stable  duonebs prn     Bipolar 1 disorder  Stable  - continue home meds: depakote, olanzapine, risperidone     Tobacco abuse  - Patient was counseled regarding smoking for 3-10 minutes.  - Encourage smoking cessation daily  - NRT offered       History of DVT (deep vein thrombosis)  -Continue home eliquis , cleared by podiatry       History of pulmonary embolus (PE)  -Continue home eliquis. clear by podiatry       Class 2 severe obesity with serious comorbidity and body mass index (BMI) of 37.0 to 37.9 in adult  Body mass index is 37.93 kg/m². Morbid obesity complicates all aspects of disease management from diagnostic modalities to treatment. Weight loss encouraged and health benefits explained to patient.           VTE Risk Mitigation (From admission, onward)         Ordered     apixaban tablet 5 mg  2 times daily         12/25/22 1119     Place sequential compression device  Until discontinued         12/24/22 1021     IP VTE HIGH RISK PATIENT  Once         12/24/22 1021     Place sequential compression device  Until discontinued          12/22/22 2256                Discharge Planning   HUMBERTO: 12/23/2022     Code Status: Full Code   Is the patient medically ready for discharge?:     Reason for patient still in hospital (select all that apply): Patient trending condition, Laboratory test, Treatment and Consult recommendations  Discharge Plan A: Tony Galicia DO  Department of Hospital Medicine   [facility] [place] [facility]

## 2022-12-26 NOTE — ASSESSMENT & PLAN NOTE
-Pt with erythema and wound of LLE. Follows with wound care  -Was on oral levaquin outpatient for one week without improvement  -XR left foot: Large plantar ulceration.  No acute fracture.  -Wound culture with +MRSA  -ID consulted: awaiting final intraop cx/path   -Continue vancomycin day 5, cefepime day 3

## 2022-12-27 ENCOUNTER — TELEPHONE (OUTPATIENT)
Dept: WOUND CARE | Facility: HOSPITAL | Age: 52
End: 2022-12-27
Payer: MEDICAID

## 2022-12-27 VITALS
BODY MASS INDEX: 37.77 KG/M2 | SYSTOLIC BLOOD PRESSURE: 141 MMHG | OXYGEN SATURATION: 93 % | TEMPERATURE: 98 F | HEIGHT: 66 IN | RESPIRATION RATE: 20 BRPM | WEIGHT: 235 LBS | HEART RATE: 64 BPM | DIASTOLIC BLOOD PRESSURE: 64 MMHG

## 2022-12-27 LAB
AT III ACT/NOR PPP CHRO: 80 % (ref 83–118)
BACTERIA SPEC AEROBE CULT: NO GROWTH
BACTERIA SPEC ANAEROBE CULT: NORMAL
POCT GLUCOSE: 226 MG/DL (ref 70–110)
POCT GLUCOSE: 259 MG/DL (ref 70–110)
PROT C ACT/NOR PPP CHRO: 108 % (ref 70–150)
PROT S ACT/NOR PPP: 145 % (ref 65–160)

## 2022-12-27 PROCEDURE — 25000003 PHARM REV CODE 250: Performed by: NURSE PRACTITIONER

## 2022-12-27 PROCEDURE — 99024 PR POST-OP FOLLOW-UP VISIT: ICD-10-PCS | Mod: ,,, | Performed by: PODIATRIST

## 2022-12-27 PROCEDURE — 99024 POSTOP FOLLOW-UP VISIT: CPT | Mod: ,,, | Performed by: PODIATRIST

## 2022-12-27 PROCEDURE — S4991 NICOTINE PATCH NONLEGEND: HCPCS | Performed by: STUDENT IN AN ORGANIZED HEALTH CARE EDUCATION/TRAINING PROGRAM

## 2022-12-27 PROCEDURE — 25000003 PHARM REV CODE 250: Performed by: EMERGENCY MEDICINE

## 2022-12-27 PROCEDURE — 63600175 PHARM REV CODE 636 W HCPCS: Performed by: EMERGENCY MEDICINE

## 2022-12-27 PROCEDURE — 63600175 PHARM REV CODE 636 W HCPCS: Performed by: STUDENT IN AN ORGANIZED HEALTH CARE EDUCATION/TRAINING PROGRAM

## 2022-12-27 PROCEDURE — 99233 PR SUBSEQUENT HOSPITAL CARE,LEVL III: ICD-10-PCS | Mod: ,,, | Performed by: STUDENT IN AN ORGANIZED HEALTH CARE EDUCATION/TRAINING PROGRAM

## 2022-12-27 PROCEDURE — 25000003 PHARM REV CODE 250: Performed by: STUDENT IN AN ORGANIZED HEALTH CARE EDUCATION/TRAINING PROGRAM

## 2022-12-27 PROCEDURE — 97161 PT EVAL LOW COMPLEX 20 MIN: CPT

## 2022-12-27 PROCEDURE — 99233 SBSQ HOSP IP/OBS HIGH 50: CPT | Mod: ,,, | Performed by: STUDENT IN AN ORGANIZED HEALTH CARE EDUCATION/TRAINING PROGRAM

## 2022-12-27 PROCEDURE — 94640 AIRWAY INHALATION TREATMENT: CPT

## 2022-12-27 RX ORDER — TRAMADOL HYDROCHLORIDE 50 MG/1
50 TABLET ORAL EVERY 8 HOURS PRN
Qty: 12 TABLET | Refills: 0 | Status: SHIPPED | OUTPATIENT
Start: 2022-12-27 | End: 2023-01-03

## 2022-12-27 RX ORDER — MUPIROCIN 20 MG/G
OINTMENT TOPICAL 2 TIMES DAILY
Status: DISCONTINUED | OUTPATIENT
Start: 2022-12-27 | End: 2022-12-27 | Stop reason: HOSPADM

## 2022-12-27 RX ORDER — SULFAMETHOXAZOLE AND TRIMETHOPRIM 800; 160 MG/1; MG/1
1 TABLET ORAL 2 TIMES DAILY
Qty: 20 TABLET | Refills: 0 | Status: SHIPPED | OUTPATIENT
Start: 2022-12-27 | End: 2023-01-06

## 2022-12-27 RX ORDER — CHLORHEXIDINE GLUCONATE ORAL RINSE 1.2 MG/ML
10 SOLUTION DENTAL 2 TIMES DAILY
Status: DISCONTINUED | OUTPATIENT
Start: 2022-12-27 | End: 2022-12-27 | Stop reason: HOSPADM

## 2022-12-27 RX ADMIN — VANCOMYCIN HYDROCHLORIDE 1750 MG: 500 INJECTION, POWDER, LYOPHILIZED, FOR SOLUTION INTRAVENOUS at 10:12

## 2022-12-27 RX ADMIN — DIVALPROEX SODIUM 500 MG: 250 TABLET, DELAYED RELEASE ORAL at 08:12

## 2022-12-27 RX ADMIN — INSULIN ASPART 2 UNITS: 100 INJECTION, SOLUTION INTRAVENOUS; SUBCUTANEOUS at 08:12

## 2022-12-27 RX ADMIN — Medication 1 PATCH: at 08:12

## 2022-12-27 RX ADMIN — FERROUS SULFATE TAB 325 MG (65 MG ELEMENTAL FE) 1 EACH: 325 (65 FE) TAB at 08:12

## 2022-12-27 RX ADMIN — THERA TABS 1 TABLET: TAB at 08:12

## 2022-12-27 RX ADMIN — TRAMADOL HYDROCHLORIDE 50 MG: 50 TABLET, COATED ORAL at 06:12

## 2022-12-27 RX ADMIN — FLUTICASONE FUROATE AND VILANTEROL TRIFENATATE 1 PUFF: 100; 25 POWDER RESPIRATORY (INHALATION) at 06:12

## 2022-12-27 RX ADMIN — METOPROLOL TARTRATE 50 MG: 50 TABLET, FILM COATED ORAL at 08:12

## 2022-12-27 RX ADMIN — APIXABAN 5 MG: 5 TABLET, FILM COATED ORAL at 08:12

## 2022-12-27 RX ADMIN — CHLORHEXIDINE GLUCONATE 0.12% ORAL RINSE 10 ML: 1.2 LIQUID ORAL at 12:12

## 2022-12-27 RX ADMIN — FLUTICASONE PROPIONATE 100 MCG: 50 SPRAY, METERED NASAL at 08:12

## 2022-12-27 RX ADMIN — RISPERIDONE 3 MG: 1 TABLET ORAL at 08:12

## 2022-12-27 RX ADMIN — GABAPENTIN 600 MG: 300 CAPSULE ORAL at 08:12

## 2022-12-27 RX ADMIN — LISINOPRIL 10 MG: 5 TABLET ORAL at 08:12

## 2022-12-27 RX ADMIN — CEFEPIME 2 G: 2 INJECTION, POWDER, FOR SOLUTION INTRAVENOUS at 01:12

## 2022-12-27 RX ADMIN — CEFEPIME 2 G: 2 INJECTION, POWDER, FOR SOLUTION INTRAVENOUS at 04:12

## 2022-12-27 RX ADMIN — MUPIROCIN: 20 OINTMENT TOPICAL at 12:12

## 2022-12-27 RX ADMIN — TRAMADOL HYDROCHLORIDE 50 MG: 50 TABLET, COATED ORAL at 12:12

## 2022-12-27 RX ADMIN — INSULIN ASPART 3 UNITS: 100 INJECTION, SOLUTION INTRAVENOUS; SUBCUTANEOUS at 12:12

## 2022-12-27 RX ADMIN — ASPIRIN 81 MG: 81 TABLET, COATED ORAL at 08:12

## 2022-12-27 NOTE — ASSESSMENT & PLAN NOTE
51 yo female with bipolar d/o, prior splenectomy, DM c/b peripheral neuropathy and prior polymicrobial diabetic foot ulcer, prior mrsa bacteremia, tobacco use, and CAD admitted for wound infection. Work up notable for MRI with advanced charcot arthropathy with phlegmon present  - s/p OR 12/24, OR cx so far ngtd and path in process. Spoke with podiatry, bone intraop did not appear c/w OM. Not a home IV abx candidate.     Prior L leg cellulitis now resolved.     Recommendations:  -continue empiric vanco pharm to dose while admitted  -transition to PO bactrim to complete 14d total from OR for SSTI, lashonda 1/6, pt reported tolerated bactrim in the past  -wound care outpatient  -f/u cx/path - OR cx so far unrevealing; lower suspicion for OM intraop per my discussion with podiatry  -MRSA decolonization protocol ordered  -continue isolation per hospital protocol

## 2022-12-27 NOTE — PLAN OF CARE
Problem: Physical Therapy  Goal: Physical Therapy Goal  Outcome: Adequate for Care Transition   Initial PT evaluation performed today.  Pt OK for D/C home with family from PT standpoint.  DME in place.  Outpatient PT recommended for weakness and deconditioning as pt does not qualify for HHPT.  PT to sign off

## 2022-12-27 NOTE — PLAN OF CARE
Waiting to be evaluated by PT.  TN will continue to asses dc needs.   12/27/22 1333   Post-Acute Status   Post-Acute Authorization Other   Hospital Resources/Appts/Education Provided Provided patient/caregiver with written discharge plan information;Appointments scheduled and added to AVS   Discharge Delays None known at this time   Discharge Plan   Discharge Plan A Home   Discharge Plan B Home
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
## 2022-12-27 NOTE — PROGRESS NOTES
Pharmacokinetic Assessment Follow Up: IV Vancomycin    Vancomycin serum concentration assessment(s):    The trough level was drawn correctly and can be used to guide therapy at this time. The measurement is within the desired definitive target range of 10 to 20 mcg/mL.    Vancomycin Regimen Plan:    Continue regimen to Vancomycin 1750 mg IV every 12 hours with next serum trough concentration measured at 2130 prior to 4th dose on 12/28/22    Drug levels (last 3 results):  Recent Labs   Lab Result Units 12/24/22  2132 12/26/22  2123   Vancomycin-Trough ug/mL 14.4 15.3       Pharmacy will continue to follow and monitor vancomycin.    Please contact pharmacy at extension [phone] for questions regarding this assessment.    Thank you for the consult,   Graham Anderson       Patient brief summary:  Audrey Natarajan is a 52 y.o. female initiated on antimicrobial therapy with IV Vancomycin for treatment of skin & soft tissue infection    The patient's current regimen is Vancomycin 1750 mg q12h    Drug Allergies:   Review of patient's allergies indicates:   Allergen Reactions    Morphine Other (See Comments)     Patient had a psychotic episode after taking Morphine  Agitation, hallucinations    Penicillins Anaphylaxis     Tolerated cephalosporins in the past    Januvia [sitagliptin] Hives    Carbamazepine Other (See Comments)     hyponatremia       Actual Body Weight:   106.6 kg    Renal Function:   Estimated Creatinine Clearance: 138.5 mL/min (based on SCr of 0.6 mg/dL).,     Dialysis Method (if applicable):  N/A    CBC (last 72 hours):  Recent Labs   Lab Result Units 12/24/22  0549 12/25/22  0549   WBC K/uL 11.35 10.92   Hemoglobin g/dL 9.4* 9.3*   Hematocrit % 29.4* 28.5*   Platelets K/uL 647* 628*   Gran % % 42.1 48.0   Lymph % % 39.5 33.0   Mono % % 13.0 13.5   Eosinophil % % 4.3 4.5   Basophil % % 0.7 0.7   Differential Method  Automated Automated       Metabolic Panel (last 72 hours):  Recent Labs   Lab Result  Units 12/24/22  0549 12/25/22  0429   Sodium mmol/L 134* 138   Potassium mmol/L 4.4 4.8   Chloride mmol/L 101 104   CO2 mmol/L 24 26   Glucose mg/dL 122* 149*   BUN mg/dL 11 9   Creatinine mg/dL 0.7 0.6   Magnesium mg/dL 1.9 1.9       Vancomycin Administrations:  vancomycin given in the last 96 hours                     vancomycin (VANCOCIN) 1,750 mg in dextrose 5 % 500 mL IVPB (mg) 1,750 mg New Bag 12/26/22 2218     1,750 mg New Bag  1025     1,750 mg New Bag 12/25/22 2132     1,750 mg New Bag  1001     1,750 mg New Bag 12/24/22 2230     1,750 mg New Bag  1104     1,750 mg New Bag 12/23/22 2224                    Microbiologic Results:  Microbiology Results (last 7 days)       Procedure Component Value Units Date/Time    Blood culture x two cultures. Draw prior to antibiotics. [[id number]] Collected: 12/22/22 1751    Order Status: Completed Specimen: Blood from Peripheral, Antecubital, Left Updated: 12/26/22 1903     Blood Culture, Routine No Growth after 4 days.    Narrative:      Aerobic and anaerobic    Blood culture x two cultures. Draw prior to antibiotics. [[id number]] Collected: 12/22/22 1743    Order Status: Completed Specimen: Blood from Peripheral, Forearm, Left Updated: 12/26/22 1903     Blood Culture, Routine No Growth after 4 days.    Narrative:      Aerobic and anaerobic    Culture, Anaerobe [[id number]] Collected: 12/24/22 0933    Order Status: Completed Specimen: Bone from Foot, Left Updated: 12/26/22 1347     Anaerobic Culture Culture in progress    Narrative:      Left Mid Foot Bone    Aerobic culture [[id number]] Collected: 12/24/22 0933    Order Status: Completed Specimen: Bone from Foot, Left Updated: 12/26/22 0951     Aerobic Bacterial Culture No growth    Narrative:      Left Mid Foot Bone    Culture, Anaerobe [[id number]] Collected: 12/23/22 1115    Order Status: Completed Specimen: Wound from Foot, Left Updated: 12/26/22 0817     Anaerobic Culture Culture in progress    AFB Culture & Smear  [[id number]] Collected: 12/24/22 0933    Order Status: Completed Specimen: Bone from Foot, Left Updated: 12/25/22 2127     AFB Culture & Smear Culture in progress    Narrative:      Left Mid Foot Bone    Aerobic culture [[id number]]  (Abnormal)  (Susceptibility) Collected: 12/23/22 1115    Order Status: Completed Specimen: Wound from Foot, Left Updated: 12/25/22 0847     Aerobic Bacterial Culture METHICILLIN RESISTANT STAPHYLOCOCCUS AUREUS  Moderate  Called to Marcy [address]  12/25/2022  08:47      Gram stain [[id number]] Collected: 12/24/22 0933    Order Status: Completed Specimen: Bone from Foot, Left Updated: 12/25/22 0745     Gram Stain Result Few WBC's      No organisms seen    Narrative:      Left Mid Foot Bone    Fungus culture [[id number]] Collected: 12/24/22 0933    Order Status: Sent Specimen: Bone from Foot, Left Updated: 12/24/22 1751    Gram stain [[id number]] Collected: 12/23/22 1115    Order Status: Completed Specimen: Wound from Foot, Left Updated: 12/23/22 1316     Gram Stain Result No organisms seen      No WBC's
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
## 2022-12-27 NOTE — PROGRESS NOTES
[facility]  Podiatry  Progress Note    Patient Name: Audrey Natarajan  MRN: [id number]  Admission Date: 12/22/2022  Hospital Length of Stay: 2 days  Attending Physician: Velvet Galicia DO  Primary Care Provider: Donaldo Pena MD     Subjective:     History of Present Illness: Audrey Natarajan is a 52 y.o. female with  has a past medical history of ADHD (attention deficit hyperactivity disorder), Arthritis, Asthma, Bipolar 1 disorder, Cataract, Cigarette smoker, COPD (chronic obstructive pulmonary disease), Coronary artery disease, Depression, Diabetes mellitus, Diabetic foot ulcers, Diabetic neuropathy, DVT of lower extremity, bilateral, Encounter for blood transfusion, History of blood clots, HTN (hypertension), Hypercholesteremia, Irregular heartbeat, Neuromuscular disorder, Obese, PE (pulmonary embolism), and Restless leg syndrome.  Consulted to the podiatry clinic for evaluation and treatment of foot ulcer.   Location: plantar and midfoot.  Patient is known to me and followed in wound clinic.  Onset of the symptoms was several months ago. Precipitating event: charcot deformity and edema lead to blister to plantar foot.  On our last wound care encounter I informed patient that I was concerned about increased depth and evidence of excess pressure to the foot.  She subsequently ordered a kneeler scooter, arrived at her home yesterday.  However she states leg and foot had been swelling and more painful despite being on PO antibiotics and she was afraid of sever infection or DVT and presented to the ED.     Interval History: 1 day status post Left foot I&D. Nurse called yesterday to relay post op bleeding after patient was WB on foot. Instructed to reinfornce dressing, and press imporantace of NWB to patient. Patient WB on foot once more later that even with less strike thru. She states she had urinated on her and needed to get up to clean herself. i urged her to request asstiance in the future.  BP elevated. Afebrile. No leukocytosis, WBC continue to down trend. Wound cultures collected 12/23 speciated MRSA, patient now on contract precuations. Intraoperative cultures pending.       12/27/2022  patient seen at bedside resting comfortably, anxious for discharge.  2 friends present. One who seems to be very helpful in managing care and assisting Ms. Natarajan.  Requesting [facility] home [facility], bedside commode, shower chair, polar bear, compound pain cream for discharge.  Also wanted to discuss referral to ortho for charcot recon. Audrey admits to walking on foot without shoe to go to the bathroom    Follow-up For: Procedure(s) (LRB):  INCISION AND DRAINAGE, FOOT (Left)    Post-Operative Day: 3 Days Post-Op    Scheduled Meds:   apixaban  5 mg Oral BID    aspirin  81 mg Oral Daily    ceFEPime (MAXIPIME) IVPB  2 g Intravenous Q8H    chlorhexidine  10 mL Mouth/Throat BID    divalproex  1,250 mg Oral QHS    divalproex  500 mg Oral Daily    ferrous sulfate  1 tablet Oral Daily    fluticasone furoate-vilanteroL  1 puff Inhalation Daily    gabapentin  600 mg Oral BID    LIDOcaine  1 patch Transdermal Q24H    lisinopriL  10 mg Oral Daily    metoprolol tartrate  50 mg Oral BID    multivitamin  1 tablet Oral Daily    mupirocin   Nasal BID    nicotine  1 patch Transdermal Daily    pravastatin  40 mg Oral QHS    risperiDONE  3 mg Oral BID    vancomycin (VANCOCIN) IVPB  1,750 mg Intravenous Q12H     Continuous Infusions:  PRN Meds:acetaminophen, albuterol-ipratropium, aluminum-magnesium hydroxide-simethicone, cadexomer iodine, dextrose 10%, dextrose 10%, fluticasone propionate, glucagon (human recombinant), glucose, glucose, hydrOXYzine, insulin aspart U-100, melatonin, naloxone, ondansetron, sodium chloride 0.9%, sodium chloride 0.9%, traMADoL, Pharmacy to dose Vancomycin consult **AND** vancomycin - pharmacy to dose    Review of Systems   Constitutional:  Positive for activity change. Negative for appetite change and fever.    Respiratory:  Positive for cough (smoker). Negative for shortness of breath.    Cardiovascular:  Positive for leg swelling. Negative for chest pain.   Gastrointestinal:  Negative for diarrhea, nausea and vomiting.   Musculoskeletal:  Positive for arthralgias and myalgias.   Skin:  Positive for color change and wound.   Neurological:  Positive for numbness. Negative for weakness.        + paresthesia    Objective:     Vital Signs (Most Recent):  Temp: 98.2 °F (36.8 °C) (12/27/22 1131)  Pulse: 64 (12/27/22 1131)  Resp: 20 (12/27/22 1251)  BP: (!) 141/64 (12/27/22 1131)  SpO2: (!) 93 % (12/27/22 1131)   Vital Signs (24h Range):  Temp:  [98 °F (36.7 °C)-98.6 °F (37 °C)] 98.2 °F (36.8 °C)  Pulse:  [64-85] 64  Resp:  [16-20] 20  SpO2:  [93 %-99 %] 93 %  BP: (140-182)/(63-84) 141/64     Weight: 106.6 kg (235 lb)  Body mass index is 37.93 kg/m².    Physical Exam  Nursing note reviewed.   Constitutional:       General: She is not in acute distress.     Appearance: She is not toxic-appearing or diaphoretic.   Cardiovascular:      Pulses:           Dorsalis pedis pulses are 1+ on the right side and 1+ on the left side.        Posterior tibial pulses are 2+ on the right side and 2+ on the left side.   Pulmonary:      Effort: No respiratory distress.   Musculoskeletal:      Right lower leg: Edema present.      Left lower leg: Edema present.      Right ankle: Swelling present. No lateral malleolus, medial malleolus, AITF ligament, CF ligament or posterior TF ligament tenderness. Decreased range of motion.      Right Achilles Tendon: No defects. Paniagua's test negative.      Left ankle: Swelling present. No lateral malleolus, medial malleolus, AITF ligament, CF ligament, posterior TF ligament or proximal fibula tenderness. Decreased range of motion.      Left Achilles Tendon: No defects. Paniagua's test negative.      Right foot: Swelling and tenderness present.      Left foot: Swelling, Charcot foot and tenderness present.       Comments: There is equinus deformity bilateral with decreased dorsiflexion at the ankle joint bilateral    Decreased first MPJ range of motion both weightbearing and nonweightbearing, no crepitus observed the first MP joint, + dorsal flag sign. Mild  bunion deformity is observed .    Patient has hammertoes of digits 2-5 bilateral partially reducible without symptom today.     Skin:     General: Skin is warm and dry.      Coloration: Skin is not pale.      Findings: Erythema and wound (see below) present. No laceration.      Nails: There is no clubbing.   Neurological:      Sensory: No sensory deficit.      Motor: No tremor, atrophy or abnormal muscle tone.      Deep Tendon Reflexes: Reflexes are normal and symmetric.      Comments: Paresthesias, and hyperesthesia bilateral feet at toes with no clearly identified trigger or source.    Psychiatric:         Attention and Perception: She is attentive.         Mood and Affect: Mood  anxious. Affect is not inappropriate.         Speech: She is communicative. Speech is not slurred.         Behavior: Behavior is not combative.           12/27/2022     Ulcer location: left plantar midfoot/midline  Signs of infection: local edema and erythema  Drainage: Sero-Sanguinous  Purulence: no  Crepitus/fluctuance: no  Periwound: Reddened, Macerated, Calloused  Base: Mixed Granular/Fibrotic  Depth:  bone  Probe to bone: yes          12/25/2022        Laboratory:  A1C:   Recent Labs   Lab 09/08/22  0546 09/20/22  0957 12/22/22  2322   HGBA1C 9.1* 8.6* 7.1*     CBC:   Recent Labs   Lab 12/25/22  0549   WBC 10.92   RBC 3.63*   HGB 9.3*   HCT 28.5*   *   MCV 79*   MCH 25.6*   MCHC 32.6     CMP:   Recent Labs   Lab 12/23/22  0433 12/24/22  0549 12/25/22  0429   *   < > 149*   CALCIUM 8.6*   < > 8.4*   ALBUMIN 3.0*  --   --    PROT 6.6  --   --    *   < > 138   K 4.1   < > 4.8   CO2 25   < > 26      < > 104   BUN 10   < > 9   CREATININE 0.7   < > 0.6   ALKPHOS 84   --   --    ALT 7*  --   --    AST 8*  --   --    BILITOT 0.3  --   --     < > = values in this interval not displayed.     CRP:   Recent Labs   Lab 12/22/22 1755   CRP 74.0*     ESR:   Recent Labs   Lab 12/22/22 1755   SEDRATE 77*     Microbiology Results (last 7 days)       Procedure Component Value Units Date/Time    Culture, Anaerobe [[id number]] Collected: 12/23/22 1115    Order Status: Completed Specimen: Wound from Foot, Left Updated: 12/27/22 1002     Anaerobic Culture No anaerobes isolated    Aerobic culture [[id number]] Collected: 12/24/22 0933    Order Status: Completed Specimen: Bone from Foot, Left Updated: 12/27/22 0812     Aerobic Bacterial Culture No growth    Narrative:      Left Mid Foot Bone    Blood culture x two cultures. Draw prior to antibiotics. [[id number]] Collected: 12/22/22 1751    Order Status: Completed Specimen: Blood from Peripheral, Antecubital, Left Updated: 12/26/22 1903     Blood Culture, Routine No Growth after 4 days.    Narrative:      Aerobic and anaerobic    Blood culture x two cultures. Draw prior to antibiotics. [[id number]] Collected: 12/22/22 1743    Order Status: Completed Specimen: Blood from Peripheral, Forearm, Left Updated: 12/26/22 1903     Blood Culture, Routine No Growth after 4 days.    Narrative:      Aerobic and anaerobic    Culture, Anaerobe [[id number]] Collected: 12/24/22 0933    Order Status: Completed Specimen: Bone from Foot, Left Updated: 12/26/22 1347     Anaerobic Culture Culture in progress    Narrative:      Left Mid Foot Bone    AFB Culture & Smear [[id number]] Collected: 12/24/22 0933    Order Status: Completed Specimen: Bone from Foot, Left Updated: 12/25/22 2127     AFB Culture & Smear Culture in progress    Narrative:      Left Mid Foot Bone    Aerobic culture [[id number]]  (Abnormal)  (Susceptibility) Collected: 12/23/22 1115    Order Status: Completed Specimen: Wound from Foot, Left Updated: 12/25/22 0847     Aerobic Bacterial Culture METHICILLIN  RESISTANT STAPHYLOCOCCUS AUREUS  Moderate  Called to Marcy Nguyen DALIA  12/25/2022  08:47      Gram stain [[id number]] Collected: 12/24/22 0933    Order Status: Completed Specimen: Bone from Foot, Left Updated: 12/25/22 0745     Gram Stain Result Few WBC's      No organisms seen    Narrative:      Left Mid Foot Bone    Fungus culture [[id number]] Collected: 12/24/22 0933    Order Status: Sent Specimen: Bone from Foot, Left Updated: 12/24/22 1751    Gram stain [[id number]] Collected: 12/23/22 1115    Order Status: Completed Specimen: Wound from Foot, Left Updated: 12/23/22 1316     Gram Stain Result No organisms seen      No WBC's            Diagnostic Results:  Imaging Results              US Lower Extremity Veins Left (Final result)  Result time 12/22/22 19:28:06      Final result by Gabe Chou MD (12/22/22 19:28:06)                   Impression:      No evidence of deep venous thrombosis in the left lower extremity.      Electronically signed by: Gabe Chou MD  Date:    12/22/2022  Time:    19:28               Narrative:    EXAMINATION:  US LOWER EXTREMITY VEINS LEFT    CLINICAL HISTORY:  Edema, unspecified    TECHNIQUE:  Duplex and color flow Doppler evaluation and graded compression of the left lower extremity veins was performed.    COMPARISON:  09/12/2022    FINDINGS:  Duplex and color flow Doppler evaluation does not reveal any evidence of acute venous thrombosis in the common femoral, superficial femoral, greater saphenous, popliteal, peroneal, anterior tibial and posterior tibial veins of the left lower extremity.  There is no reflux to suggest valvular incompetence.                                       X-Ray Chest AP Portable (Final result)  Result time 12/22/22 19:00:12      Final result by Gabe Chou MD (12/22/22 19:00:12)                   Impression:      1. Improved interstitial attenuation, residual edema is not excluded.  No large focal consolidation.      Electronically signed  by: Gabe Chou MD  Date:    12/22/2022  Time:    19:00               Narrative:    EXAMINATION:  XR CHEST AP PORTABLE    CLINICAL HISTORY:  Sepsis;    TECHNIQUE:  Single frontal view of the chest was performed.    COMPARISON:  09/12/2022    FINDINGS:  The cardiomediastinal silhouette is prominent, similar to the previous examination noting some magnification by technique..  There is no pleural effusion.  The trachea is midline.  The lungs are symmetrically expanded bilaterally with coarse interstitial attenuation, improved since the previous exam..  No large focal consolidation seen.  There is no pneumothorax.  The osseous structures are remarkable for degenerative changes.                                       X-Ray Foot Complete Left (Final result)  Result time 12/22/22 19:36:46      Final result by Halle Gruber MD (12/22/22 19:36:46)                   Impression:      Large plantar ulceration.  No acute fracture.  As above described.      Electronically signed by: Halle Gruber  Date:    12/22/2022  Time:    19:36               Narrative:    EXAMINATION:  THREE VIEWS OF THE LEFT FOOT    CLINICAL HISTORY:  Cellulitis, unspecified    TECHNIQUE:  AP, lateral and oblique views of the left foot    COMPARISON:  12/16/2022    FINDINGS:  There is a large ulceration or soft tissue defect at the plantar aspect of the foot.  There is diffuse soft tissue thickening or edema of the foot.  Three views of the left foot demonstrate no acute fracture or dislocation.  Degenerative changes seen at the 1st metatarsal-phalangeal joint.  There is Lisfranc and developing Charcot deformity.  There is sparing or osteophytic formation involving the dorsum of the navicular.  There is calcaneal spurring.  The bones are diffusely osteopenic.                                        Assessment/Plan:     Active Diagnoses:    Diagnosis Date Noted POA    PRINCIPAL PROBLEM:  Cellulitis of left foot [L03.116] 05/06/2022 Yes    Open  wound of left foot [S91.302A] 09/08/2022 Yes    Charcot's joint of left foot [M14.672]  Yes    PAD (peripheral artery disease) [I73.9] 05/06/2022 Yes    Anemia [D64.9] 04/13/2022 Yes    Class 2 severe obesity with serious comorbidity and body mass index (BMI) of 37.0 to 37.9 in adult [E66.01, Z68.37] 08/10/2016 Not Applicable    Thrombocytosis [D75.839] 07/16/2016 Yes    History of pulmonary embolus (PE) [Z86.711] 04/13/2015 Yes     Chronic    History of DVT (deep vein thrombosis) [Z86.718] 04/13/2015 Not Applicable     Chronic    Bipolar 1 disorder [F31.9] 04/13/2015 Yes     Chronic    Tobacco abuse [Z72.0] 03/06/2014 Yes     Chronic    Hyperlipidemia [E78.5] 02/13/2014 Yes    COPD (chronic obstructive pulmonary disease) [J44.9] 10/11/2013 Yes     Chronic    Essential hypertension [I10] 06/06/2013 Yes     Chronic    Type II diabetes mellitus with neurological manifestations [E11.49] 06/06/2013 Yes      Problems Resolved During this Admission:       * Cellulitis of left foot  IDSA moderate diabetic foot infection. 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 SSTI/cellulitis appeared to be resolving significantly as down trend in WBC to WNL on Abx. MRI read phlegmon and abscess, with possible osteomyelitis. Patient is now S/P L foot I&D without significant nonviable tissue and no abscess noted intraoperatively. Bone culture of medal cuneiform adjacent to ulceration was obtained intraoperatively, currently NGTD. Intraoperative Deep wound swab culture NGTD. Wound culture obtained by house staff 12/23 speciated MRSA and patient is on contact precautions with ID on board.     - Continue ABx per ID  - Patient needs to remain NWB on Left foot. She has a knee scooter bedside as wheel chair in her home. Patient requesting shower chair and bedside commode  - Patient wound likely benefit from skin substitute +/- NPWT wound vac on discharge   - Dressing  changed per podiatry, wound packing and DSD.   - Once ABx plan is in place patient is okay to discharge from podiatry perspective on HH if available as follows:   HH Nursing Wound Care Orders  Change dressing three times weekly  1. Surgical scrub to lower leg and foot with Hibiclens evaluate for acute changes (purulence, increased redness/swelling, increased drainage, malodor, increased pain, pallor, necrosis) please contact physician on any acute changes  2. Pat completely dry  3. iodosorb preferably, previously prescribed antibiotic powder or collagen with silver alternatively to the wound of the left foot  4. Foam x2, cast padding x2, surgilast style football    - Patient will require diligent wound care and aggressive offloading of the left foot        FUTURE Discharge recommendations:   - Before discharge please make sure she has wound clinic follow up within 7 days, home health if availble (stated [facility] will come to her town), and if feasible setup an ambulatory appointment to follow up with Dr. Velasquez or Dr Nikki Grullon ([facility]/ patient requested) 3-4 weeks after discharge to offer charcot reconstruction in the distant future once concerns of infection are null and ulceration is healed.   - L foot NWB, R foot WBAT     Counseled patient on the effects of smoking on healing. She verbalizes understanding that smoking and/or history of smoking has an adverse effect on circulation an can increase the chances of delayed healing and this prolonged exposure leads to infection or progression of infection. Also discussed blood glucose and mechanical pressure leading to delayed healing as well during our 50 minute encounter.       Maira De Los Santos DPM  Podiatry  [facility] [place] - Med Surg

## 2022-12-27 NOTE — PLAN OF CARE
Problem: Adult Inpatient Plan of Care  Goal: Plan of Care Review  Outcome: Ongoing, Progressing  Goal: Optimal Comfort and Wellbeing  Outcome: Ongoing, Progressing     Problem: Diabetes Comorbidity  Goal: Blood Glucose Level Within Targeted Range  Outcome: Ongoing, Progressing     Problem: Fall Injury Risk  Goal: Absence of Fall and Fall-Related Injury  Outcome: Ongoing, Progressing     Pt free from falls or any further trauma through out the shift. Prescribed medication administered. Complaints of pain, prn medication given.  Blood glucose monitored. Pt in no distress. Will continue to monitor.

## 2022-12-27 NOTE — PLAN OF CARE
[facility] - Med Surg  Discharge Final Note    TN informed med surg nurse Radha that patient is cleared for discharge from case management's viewpoint.  TN sent home health orders to [facility] as requested to do. Dr. Galicia to send orders for out patient Physical therapy.  Wound care appt is for Thrusday with Dr. aHrden.  Primary Care Provider: Donaldo Pena MD    Expected Discharge Date: 12/27/2022    Final Discharge Note (most recent)       Final Note - 12/27/22 1620          Final Note    Assessment Type Final Discharge Note     Anticipated Discharge Disposition Home-Health Care [facility]   TN sent home health orders to [facility] as requested to do.    What phone number can be called within the next 1-3 days to see how you are doing after discharge? --   see chart    Hospital Resources/Appts/Education Provided Provided patient/caregiver with written discharge plan information        Post-Acute Status    Post-Acute Authorization Home Health     Home Health Status Referrals Sent   TN sent home health orders to [facility] as requested to do.    Discharge Delays None known at this time                     Important Message from Medicare na             Contact Info       Donaldo Pena MD   Specialty: Internal Medicine, Wound Care   Relationship: PCP - General    [address]  [place] [address]   Phone: [phone]       Next Steps: Follow up on 1/4/2023    Instructions: 9:00 AM 3 mos and hospital follow up    Maira De Los Santos DPM   Specialty: Podiatry, Wound Care    [address] [facility] [address]  [place] [address]   Phone: [phone]       Next Steps: Follow up on 1/5/2023    Instructions: WILL SEE DR. WEAVER AT 11:00 am FOR WOUND CARE, For wound re-check    OUT PATIENT PHYSICAL THERAPY    WILL CONTACT PATIENT.       Next Steps: Follow up    Instructions: out patient services

## 2022-12-27 NOTE — PLAN OF CARE
Follow-up Information       Donaldo Pena MD Follow up on 1/4/2023.    Specialties: Internal Medicine, Wound Care  Why: 9:00 AM 3 mos and hospital follow up  Contact information:  [address] [place] [address] [phone]               Maira De Los Santos DPM Follow up on 1/5/2023.    Specialties: Podiatry, Wound Care  Why: WILL SEE DR. WAEVER AT 11:00 am FOR WOUND CARE, For wound re-check  Contact information:  [address] [place] [address] [phone]               OUT PATIENT PHYSICAL THERAPY Follow up.    Why: out patient services  Contact information:  WILL CONTACT PATIENT.             [facility] Follow up.    Why: out patient services  Contact information:  [phone]  First Floor of   [facility] [address] [place] [address]

## 2022-12-27 NOTE — PT/OT/SLP EVAL
Physical Therapy Evaluation and Discharge Note    Patient Name:  Audrey Natarajan   MRN:  [id number]    Recommendations:     Discharge Recommendations: outpatient PT  Discharge Equipment Recommendations: bedside commode, bath bench   Barriers to discharge:  None from PT standpoint, does not qualify for HHPT    Assessment:     Audrey Natarajan is a 52 y.o. female admitted with a medical diagnosis of Cellulitis of left foot. .  At this time, patient is functioning at their prior level of function and does not require further acute PT services.     Recent Surgery: Procedure(s) (LRB):  INCISION AND DRAINAGE, FOOT (Left) 3 Days Post-Op    Plan:     During this hospitalization, patient does not require further acute PT services.  Please re-consult if situation changes.      Subjective     Chief Complaint: wants to go home  Patient/Family Comments/goals: to go home  Pain/Comfort:  Pain Rating 1:  (Not rated)  Location - Orientation 1:  (L foot)  Pain Addressed 1: Reposition, Distraction, Cessation of Activity, Nurse notified    Patients cultural, spiritual, Jew conflicts given the current situation: no    Living Environment:  Pt lives in a trailer with ramp entry  Prior to admission, patients level of function was Mod I with transfers, stated that she had to WB on L LE to get from house to car in the past, but now has knee scooter.  Equipment used at home: wheelchair, walker, rolling (knee scooter).  DME owned (not currently used): none.  Upon discharge, patient will have assistance from Family and friends.    Objective:     Communicated with nsg prior to session.  Patient found sitting edge of bed with  (N/A) upon PT entry to room.    General Precautions: Standard, fall, contact    Orthopedic Precautions: (Per podiatry NWB L LE)   Braces:  (B/L Sx shoes/boots)  Respiratory Status: Room air    Exams:  Cognitive Exam:  Patient is oriented to Person, Place, Time, and Situation  Gross Motor Coordination:   impaired 2/2 pain, weakness, deconditioning, NWB L LE  Postural Exam:  Patient presented with the following abnormalities:    -       Rounded shoulders  -       Forward head  Skin Integrity/Edema:      -       Skin integrity: L Foot with Sx dressing intact  -       Edema: None noted   RLE ROM: WFL  RLE Strength: WFL  LLE ROM: WFL  LLE Strength: WFL, ankle N/T    Functional Mobility:  Bed Mobility:     Scooting: supervision  Transfers:     Sit to Stand:  stand by assistance with no AD  Bed to Chair: stand by assistance with  no AD  using  Stand Pivot  Gait: N/T, pt states that she will use her knee roller  Balance: Fair+ sit and stand     AM-PAC 6 CLICK MOBILITY  Total Score:16       Treatment and Education:  Educated pt on importance of NWB L LE for healing.  Pt verbalized/demonstrated understanding    AM-PAC 6 CLICK MOBILITY  Total Score:16     Patient left sitting edge of bed with call button in reach, nsg notified, and family present.    GOALS:   Multidisciplinary Problems       Physical Therapy Goals          Problem: Physical Therapy    Goal Priority Disciplines Outcome Goal Variances Interventions   Physical Therapy Goal     PT, PT/OT Adequate for Care Transition                         History:     Past Medical History:   Diagnosis Date    ADHD (attention deficit hyperactivity disorder)     Arthritis     Asthma     Bipolar 1 disorder     Cataract     Cigarette smoker     COPD (chronic obstructive pulmonary disease)     Coronary artery disease     A fib    Depression     bipolar manic depresson    Diabetes mellitus     Diabetic foot ulcers     Diabetic neuropathy     DVT of lower extremity, bilateral 07/2013    bilateral LE DVT. [place] filter placed.     Encounter for blood transfusion     History of blood clots 1. Left Leg=2003; 2.Bilateral Groin=Blood Clots= 5 or 6/ 2013 & 7/2013; 3. LLL of Lung=7/2013;  4. Lt. Lower Leg=7/2013.     Pt. had 1st Blood Clot after Itotcvffmvia=0012, & Last=2013. [place]  "Filter= Rt.Lateral Neck.    HTN (hypertension) 06/06/2013    Pt states that she does not have hypertension    Hypercholesteremia     Irregular heartbeat     Neuromuscular disorder     neuropathy feet    Obese     PE (pulmonary embolism) 07/2013    bilat LE DVT.     Restless leg syndrome        Past Surgical History:   Procedure Laterality Date    ABDOMINAL SURGERY  2010    gastric sleeve    BILATERAL OOPHORECTOMY Bilateral 1/12/2015    CHOLECYSTECTOMY      DEBRIDEMENT OF FOOT Bilateral 5/10/2022    Procedure: DEBRIDEMENT, FOOT;  Surgeon: Maira De Los Santos DPM;  Location: [facility] OR;  Service: Podiatry;  Laterality: Bilateral;    Green' s filter Right 7/4/2012    Right Neck & Tunneled Down.    HERNIA REPAIR      "[place] of Hernias Repaires around th Belly Button.", pt. states    INCISION AND DRAINAGE FOOT Left 12/24/2022    Procedure: INCISION AND DRAINAGE, FOOT;  Surgeon: Fahad Razo DPM;  Location: [facility] OR;  Service: Podiatry;  Laterality: Left;    LAPAROSCOPIC CHOLECYSTECTOMY N/A 9/10/2020    Procedure: CHOLECYSTECTOMY, LAPAROSCOPIC;  Surgeon: Montrell Gutierrez MD;  Location: [facility] OR;  Service: General;  Laterality: N/A;  RN PREOP 9/9----COVID Negative  9/9    OVARIAN CYST REMOVAL  3/13/2014    [place] REMOVAL OF OVARY/TUBE(S)      SPLENECTOMY, TOTAL  July 2003    TONSILLECTOMY      as a child    TYMPANOSTOMY TUBE PLACEMENT  1976    VEIN SURGERY  2003    Lt leg       Time Tracking:     PT Received On: 12/27/22  PT Start Time: 1403     PT Stop Time: 1411  PT Total Time (min): 8 min     Billable Minutes: Evaluation 8      12/27/2022  "

## 2022-12-27 NOTE — DISCHARGE SUMMARY
[facility] Medicine  Discharge Summary      Patient Name: Audrey Natarajan  MRN: [id number]  RICKIE: [id number]  Patient Class: IP- Inpatient  Admission Date: 12/22/2022  Hospital Length of Stay: 2 days  Discharge Date and Time: 12/27/2022  3:29 PM  Attending Physician: No att. providers found   Discharging Provider: Velvet Galicia DO  Primary Care Provider: Donaldo Pena MD    Primary Care Team: Networked reference to record PCT     HPI:   52 y.o. female with PMH of DM2, Charcot's foot, osteomyelitis of the foot, diabetic foot ulcer, b/l LE DVTs (on Eliquis), afib on eliquis and has a nelly filter, MRSA, PAD, h/o PE, COPD, HTN, Bipolar I, tobacco abuse, who presented presents to the emergency department for evaluation of chronic wound to her left foot.  Per wound care note pt is currently on oral abx. Does not feels well, was concerned regarding another potential clot and wound infection. ED workup revealed WBCs 17.51, H/H 10.3/30.9, Sed rate elevated, CRP elevated, blood cultures pending, chest x-ray showed improved interstitial attenuation, residual edema is not excluded.  No large focal consolidation.  Left foot x-ray showed large plantar ulceration.  No acute fracture.  Patient being admitted to hospital medicine for further medical management with podiatry and ortho consults.      Procedure(s) (LRB):  INCISION AND DRAINAGE, FOOT (Left)      Hospital Course:   52 y.o. female with PMH of DM2, Charcot's foot, osteomyelitis of the foot, diabetic foot ulcer, b/l LE DVTs (on Eliquis), afib on eliquis and has a nelly filter, MRSA, PAD, h/o PE, COPD, HTN, Bipolar I, tobacco abuse admitted to observation on 12/22/2022 for LLE wound and cellulitis. Was sent here from wound care clinic for further evaluation. XR left foot with Large plantar ulceration.  No acute fracture. Patient was started on broad spectrum antibiotics. Of note, patient was on oral levaquin for one week prior to  admission. MRI left foot with Large infectious/inflammatory phlegmon at the plantar aspect of the midfoot with multiple small interconnecting abscesses Podiatry consulted- s/p incision and drainage on 12/24. No seamus purulence, no drainable deep Abcess, but noted extensive phlegmon.   Blood cx with NGTD. Pathology pending. Wound culture with MRSA. ID consulted- recommends transition to PO bactrim to complete 14d total from OR for SSTI, lashonda 1/6, pt reported tolerated bactrim in the past.     Patient admits feeling better overall.  Pain is well controlled at this time.  Wants to go home. Pt denies any fever, headaches, vision changes, chest pain, shortness of breath, palpitations, abdominal pain, nausea, vomiting, or any new weaknesses. Feels ready to go home. Patient's exam on discharge was as follow: Patient is alert and oriented, appears in no acute distress, heart with regular rate and rhythm, lungs clear to asculation with non-labored breathing, abdomen soft, and no new weaknesses or focal deficits seen. LLE erythema - improving per patient. No bruising. L foot wrapped . Left charcot foot.     Patient was counseled regarding any abnormal labs, differential diagnosis, treatment options, risk-benefit, lifestyle changes, prognosis, current condition, and medications. Patient was interactive and attentive.  Patient's questions were answered in a respectful and timely manner. Patient was instructed to follow-up with PCP within 1 week and to continue taking medications as prescribed.  Instructed to also follow up with wound care and podiatry outpatient to follow up on pathology results. Also, extensively discussed the risks, benefits, and side effects of patient's medications. Discussed with patient about any medication changes. Patient verbalized understanding and agrees to treatment plan.  Patient is stable for discharge.  Patient has no other questions or concerns at this time.  ED precautions discussed with the  patient.    Vital signs are stable. Ambulating without any difficulty. Tolerating p.o. intake without any nausea or vomiting. Afebrile for over 24 hours. Patient is in stable condition and has no questions or concerns. Patient will be discharge to home with home health for wound care once transportation is secured. Prescriptions sent to pharmacy.  CM/SW to assist with discharge planning.          Goals of Care Treatment Preferences:  Code Status: Full Code    Living Will: Yes              Consults:   Consults (From admission, onward)        Status Ordering Provider     Inpatient consult to Infectious Diseases  Once        Provider:  Keyona Hilario MD    Completed LUIGI SHAW     Inpatient consult to Podiatry  Once        Provider:  Maira De Los Santos DPM    Completed ANGEL ENGLAND     Pharmacy to dose Vancomycin consult  Once        Provider:  (Not yet assigned)   See [facility] for full Linked Orders Report.    Acknowledged ANGEL ENGLAND          No new Assessment & Plan notes have been filed under this hospital service since the last note was generated.  Service: Hospital Medicine    Final Active Diagnoses:    Diagnosis Date Noted POA    PRINCIPAL PROBLEM:  Cellulitis of left foot [L03.116] 05/06/2022 Yes    Open wound of left foot [S91.302A] 09/08/2022 Yes    Charcot's joint of left foot [M14.672]  Yes    Type II diabetes mellitus with neurological manifestations [E11.49] 06/06/2013 Yes    Essential hypertension [I10] 06/06/2013 Yes     Chronic    Hyperlipidemia [E78.5] 02/13/2014 Yes    PAD (peripheral artery disease) [I73.9] 05/06/2022 Yes    Anemia [D64.9] 04/13/2022 Yes    Thrombocytosis [D75.839] 07/16/2016 Yes    COPD (chronic obstructive pulmonary disease) [J44.9] 10/11/2013 Yes     Chronic    Bipolar 1 disorder [F31.9] 04/13/2015 Yes     Chronic    Tobacco abuse [Z72.0] 03/06/2014 Yes     Chronic    History of DVT (deep vein thrombosis) [Z86.718] 04/13/2015 Not  "Applicable     Chronic    History of pulmonary embolus (PE) [Z86.711] 04/13/2015 Yes     Chronic    Class 2 severe obesity with serious comorbidity and body mass index (BMI) of 37.0 to 37.9 in adult [E66.01, Z68.37] 08/10/2016 Not Applicable      Problems Resolved During this Admission:       Discharged Condition: stable    Disposition: Home or Self Care    Follow Up:   Follow-up Information     Donaldo Pena MD Follow up on 1/4/2023.    Specialties: Internal Medicine, Wound Care  Why: 9:00 AM 3 mos and hospital follow up  Contact information:  [address]  [place] [address]  [phone]             Maira De Los Santos DPM Follow up on 1/5/2023.    Specialties: Podiatry, Wound Care  Why: WILL SEE DR. WEAVER AT 11:00 am FOR WOUND CARE, For wound re-check  Contact information:  [address]  [place] [id number] [phone]             OUT PATIENT PHYSICAL THERAPY Follow up.    Why: out patient services  Contact information:  WILL CONTACT PATIENT.                     Patient Instructions:      BATH/SHOWER CHAIR FOR HOME USE     Order Specific Question Answer Comments   Height: 5' 6" (1.676 m)    Weight: 106.6 kg (235 lb)    Does patient have medical equipment at home? wheelchair knee scooter / knee scooter   Does patient have medical equipment at home? walker, rolling    Length of need (1-99 months): 99    Type: With back      COMMODE FOR HOME USE     Order Specific Question Answer Comments   Type: Standard    Height: 5' 6" (1.676 m)    Weight: 106.6 kg (235 lb)    Does patient have medical equipment at home? wheelchair knee scooter / knee scooter   Does patient have medical equipment at home? walker, rolling    Length of need (1-99 months): 99      Ambulatory referral/consult to Physical/Occupational Therapy   Standing Status: Future   Referral Priority: Routine Referral Type: Physical Medicine   Referral Reason: Specialty Services Required   Number of Visits Requested: 1     Diet diabetic     Diet Cardiac "     Notify your health care provider if you experience any of the following:  temperature >100.4     Notify your health care provider if you experience any of the following:  severe uncontrolled pain     Notify your health care provider if you experience any of the following:  redness, tenderness, or signs of infection (pain, swelling, redness, odor or green/yellow discharge around incision site)     Notify your health care provider if you experience any of the following:  persistent dizziness, light-headedness, or visual disturbances     Notify your health care provider if you experience any of the following:  increased confusion or weakness     Activity as tolerated       Significant Diagnostic Studies: Labs: All labs within the past 24 hours have been reviewed     Imaging Results          US Lower Extremity Veins Left (Final result)  Result time 12/22/22 19:28:06    Final result by Gabe Chou MD (12/22/22 19:28:06)                 Impression:      No evidence of deep venous thrombosis in the left lower extremity.      Electronically signed by: Gabe Chou MD  Date:    12/22/2022  Time:    19:28             Narrative:    EXAMINATION:  US LOWER EXTREMITY VEINS LEFT    CLINICAL HISTORY:  Edema, unspecified    TECHNIQUE:  Duplex and color flow Doppler evaluation and graded compression of the left lower extremity veins was performed.    COMPARISON:  09/12/2022    FINDINGS:  Duplex and color flow Doppler evaluation does not reveal any evidence of acute venous thrombosis in the common femoral, superficial femoral, greater saphenous, popliteal, peroneal, anterior tibial and posterior tibial veins of the left lower extremity.  There is no reflux to suggest valvular incompetence.                               X-Ray Chest AP Portable (Final result)  Result time 12/22/22 19:00:12    Final result by Gabe Chou MD (12/22/22 19:00:12)                 Impression:      1. Improved interstitial attenuation,  residual edema is not excluded.  No large focal consolidation.      Electronically signed by: Gabe Chou MD  Date:    12/22/2022  Time:    19:00             Narrative:    EXAMINATION:  XR CHEST AP PORTABLE    CLINICAL HISTORY:  Sepsis;    TECHNIQUE:  Single frontal view of the chest was performed.    COMPARISON:  09/12/2022    FINDINGS:  The cardiomediastinal silhouette is prominent, similar to the previous examination noting some magnification by technique..  There is no pleural effusion.  The trachea is midline.  The lungs are symmetrically expanded bilaterally with coarse interstitial attenuation, improved since the previous exam..  No large focal consolidation seen.  There is no pneumothorax.  The osseous structures are remarkable for degenerative changes.                               X-Ray Foot Complete Left (Final result)  Result time 12/22/22 19:36:46    Final result by Halle Gruber MD (12/22/22 19:36:46)                 Impression:      Large plantar ulceration.  No acute fracture.  As above described.      Electronically signed by: Halle Gruber  Date:    12/22/2022  Time:    19:36             Narrative:    EXAMINATION:  THREE VIEWS OF THE LEFT FOOT    CLINICAL HISTORY:  Cellulitis, unspecified    TECHNIQUE:  AP, lateral and oblique views of the left foot    COMPARISON:  12/16/2022    FINDINGS:  There is a large ulceration or soft tissue defect at the plantar aspect of the foot.  There is diffuse soft tissue thickening or edema of the foot.  Three views of the left foot demonstrate no acute fracture or dislocation.  Degenerative changes seen at the 1st metatarsal-phalangeal joint.  There is Lisfranc and developing Charcot deformity.  There is sparing or osteophytic formation involving the dorsum of the navicular.  There is calcaneal spurring.  The bones are diffusely osteopenic.                                  Pending Diagnostic Studies:     Procedure Component Value Units Date/Time     Cardiolipin antibody [[id number]] Collected: 12/22/22 1832    Order Status: Sent Lab Status: In process Updated: 12/22/22 1906    Specimen: Blood     G6PD,Quantitative [[id number]] Collected: 12/22/22 1832    Order Status: Sent Lab Status: In process Updated: 12/22/22 1906    Specimen: Blood     Specimen to Pathology, Surgery Other (podiatry) [[id number]] Collected: 12/24/22 0933    Order Status: Sent Lab Status: In process Updated: 12/24/22 1751    Specimen: Tissue          Medications:  Reconciled Home Medications:      Medication List      START taking these medications    sulfamethoxazole-trimethoprim 800-160mg 800-160 mg Tab  Commonly known as: BACTRIM DS  Take 1 tablet by mouth 2 (two) times daily. for 10 days     traMADoL 50 mg tablet  Commonly known as: ULTRAM  Take 1 tablet (50 mg total) by mouth every 8 (eight) hours as needed for Pain.        CONTINUE taking these medications    acetaminophen 500 MG tablet  Commonly known as: TYLENOL  Take 2 tablets (1,000 mg total) by mouth every 6 (six) hours as needed for Pain.     albuterol 90 mcg/actuation inhaler  Commonly known as: PROVENTIL/VENTOLIN HFA  INHALE 2 PUFFS INTO THE LUNGS EVERY 6 HOURS AS NEEDED FOR WHEEZING. RESCUE     albuterol-ipratropium 2.5 mg-0.5 mg/3 mL nebulizer solution  Commonly known as: DUO-NEB  Take 3 mLs by nebulization every 6 (six) hours as needed for Wheezing or Shortness of Breath. Rescue     ammonium lactate 12 % lotion  Commonly known as: LAC-HYDRIN  APPL Y ONCE TOPICALLY TWICE DAILY FOR 30 DAYS     aspirin 81 MG Chew  Take 1 tablet (81 mg total) by mouth once daily.     BINAXNOW COVID-19 AG SELF TEST Kit  Generic drug: COVID-19 antigen test  TEST AS DIRECTED TODAY     bumetanide 1 MG tablet  Commonly known as: BUMEX  Take 1 tablet (1 mg total) by mouth once daily.     carBAMazepine 200 mg tablet  Commonly known as: TEGRETOL  Take 400 mg by mouth 2 (two) times daily.     * divalproex 250 MG EC tablet  Commonly known as: DEPAKOTE  Take  5 tablets (1,250 mg total) by mouth every evening.     * divalproex 500 MG Tbec  Commonly known as: DEPAKOTE  Take 1 tablet (500 mg total) by mouth once daily. PO QAM     DUPIXENT  mg/2 mL Pnij  Generic drug: dupilumab  Inject into the skin.     ELIQUIS 5 mg Tab  Generic drug: apixaban  Take 1 tablet (5 mg total) by mouth 2 (two) times daily.     fluticasone propionate 50 mcg/actuation nasal spray  Commonly known as: FLONASE  2 sprays (100 mcg total) by Each Nostril route daily as needed (Nasal congestion).     fluticasone-salmeterol 250-50 mcg/dose 250-50 mcg/dose diskus inhaler  Commonly known as: ADVAIR  Inhale 1 puff into the lungs 2 (two) times daily. Controller     gabapentin 300 MG capsule  Commonly known as: NEURONTIN  TAKE 2 CAPSULES(600 MG) BY MOUTH TWICE DAILY     HYDROcodone-acetaminophen  mg per tablet  Commonly known as: NORCO  Take 1 tablet by mouth every 6 (six) hours as needed for Pain.     hydrOXYzine 50 MG tablet  Commonly known as: ATARAX  Take 50 mg by mouth 4 (four) times daily as needed.     lisinopriL 10 MG tablet  Take 1 tablet (10 mg total) by mouth once daily.     loratadine 10 mg tablet  Commonly known as: CLARITIN  Take 1 tablet (10 mg total) by mouth once daily.     metFORMIN 1000 MG tablet  Commonly known as: GLUCOPHAGE  Take 1 tablet (1,000 mg total) by mouth 2 (two) times daily with meals.     methocarbamoL 500 MG Tab  Commonly known as: ROBAXIN  Take 500 mg by mouth. Frequency could not be confirmed.     metoprolol tartrate 50 MG tablet  Commonly known as: LOPRESSOR  Take 1 tablet (50 mg total) by mouth 2 (two) times daily.     multivitamin Tab  Take 1 tablet by mouth once daily.     nystatin powder  Commonly known as: NYSTOP  APPLY TO ABDOMINAL AND BREAST SKIN FOLD TWICE DAILY.     OZEMPIC 1 mg/dose (4 mg/3 mL)  Generic drug: semaglutide  Inject 1 mg into the skin every 7 days.     pantoprazole 40 MG tablet  Commonly known as: PROTONIX  Take 1 tablet (40 mg total) by  mouth once daily.     potassium chloride 10 MEQ Cpsr  Commonly known as: MICRO-K  Take 1 capsule (10 mEq total) by mouth once daily.     pravastatin 40 MG tablet  Commonly known as: PRAVACHOL  Take 1 tablet (40 mg total) by mouth every evening.     risperiDONE 3 MG disintegrating tablet  Commonly known as: RISPERDAL M-TABS  Take 1 tablet (3 mg total) by mouth 2 (two) times daily.     triamcinolone acetonide 0.025% 0.025 % cream  Commonly known as: KENALOG  Apply topically 2 (two) times daily.     VYVANSE 40 MG Cap  Generic drug: lisdexamfetamine  Take 40 mg by mouth once daily.         * This list has 2 medication(s) that are the same as other medications prescribed for you. Read the directions carefully, and ask your doctor or other care provider to review them with you.            STOP taking these medications    ceFEPIme 1 gram injection  Commonly known as: MAXIPIME     cetirizine 10 MG tablet  Commonly known as: ZYRTEC     levoFLOXacin 750 MG tablet  Commonly known as: LEVAQUIN     meloxicam 15 MG tablet  Commonly known as: MOBIC            Indwelling Lines/Drains at time of discharge:   Lines/Drains/Airways     None                 Time spent on the discharge of patient: Greater than 35 minutes         Velvet Galicia DO  Department of Hospital Medicine  [facility]

## 2022-12-27 NOTE — TELEPHONE ENCOUNTER
----- Message from Debby Russo sent at 12/27/2022 12:51 PM CST -----  Regarding: appointment  Jolie    Pt: Audrey Natarajan    Need an appointment for dr faria    Call back: [phone]

## 2022-12-27 NOTE — PROGRESS NOTES
Vancomycin consult follow-up:    Patient reviewed, renal function stable, no new levels, continue current therapy; Next levels due: trough due 12/28/2022 at 2130

## 2022-12-28 ENCOUNTER — PATIENT OUTREACH (OUTPATIENT)
Dept: ADMINISTRATIVE | Facility: CLINIC | Age: 52
End: 2022-12-28
Payer: MEDICAID

## 2022-12-28 LAB
BACTERIA SPEC ANAEROBE CULT: NORMAL
FINAL PATHOLOGIC DIAGNOSIS: NORMAL
GROSS: NORMAL
Lab: NORMAL

## 2022-12-28 NOTE — TELEPHONE ENCOUNTER
Ibuprofen 600 mg tablet 1 tablet every 6-8 hours.  Benadryl OTC unsure of dosage take 1 tablet BID.  Vit D 500 mg once daily  Vit C 500 mg once daily  Hair, Skin, and nail vitamin 1 tablet daily  Zinc 1 tablet daily unsure of dosage.

## 2022-12-28 NOTE — PROGRESS NOTES
C3 nurse spoke with Audrey Natarajan  for a [facility] post hospital discharge follow up call. The patient has a scheduled [facility] appointment with Donaldo Pena MD  on 1/4/23 @ 0900.

## 2022-12-29 ENCOUNTER — HOSPITAL ENCOUNTER (OUTPATIENT)
Dept: WOUND CARE | Facility: HOSPITAL | Age: 52
Discharge: HOME | End: 2022-12-29
Attending: FAMILY MEDICINE
Payer: MEDICAID

## 2022-12-29 ENCOUNTER — TELEPHONE (OUTPATIENT)
Dept: FAMILY MEDICINE | Facility: CLINIC | Age: 52
End: 2022-12-29
Payer: MEDICAID

## 2022-12-29 VITALS — HEART RATE: 111 BPM | TEMPERATURE: 98 F | DIASTOLIC BLOOD PRESSURE: 87 MMHG | SYSTOLIC BLOOD PRESSURE: 196 MMHG

## 2022-12-29 DIAGNOSIS — L97.509 TYPE 2 DIABETES MELLITUS WITH FOOT ULCER, WITHOUT LONG-TERM CURRENT USE OF INSULIN: ICD-10-CM

## 2022-12-29 DIAGNOSIS — L97.423 LEFT MIDFOOT ULCER, WITH NECROSIS OF MUSCLE: Primary | ICD-10-CM

## 2022-12-29 DIAGNOSIS — E11.621 TYPE 2 DIABETES MELLITUS WITH FOOT ULCER, WITHOUT LONG-TERM CURRENT USE OF INSULIN: ICD-10-CM

## 2022-12-29 DIAGNOSIS — Z86.718 HISTORY OF DVT (DEEP VEIN THROMBOSIS): ICD-10-CM

## 2022-12-29 DIAGNOSIS — M14.672 CHARCOT'S JOINT OF LEFT FOOT: Primary | ICD-10-CM

## 2022-12-29 LAB
CARDIOLIPIN IGG SER IA-ACNC: <9.4 GPL (ref 0–14.99)
CARDIOLIPIN IGM SER IA-ACNC: 10.9 MPL (ref 0–12.49)

## 2022-12-29 PROCEDURE — 99214 OFFICE O/P EST MOD 30 MIN: CPT | Mod: ,,, | Performed by: FAMILY MEDICINE

## 2022-12-29 PROCEDURE — 99214 PR OFFICE/OUTPT VISIT, EST, LEVL IV, 30-39 MIN: ICD-10-PCS | Mod: ,,, | Performed by: FAMILY MEDICINE

## 2022-12-29 PROCEDURE — 99214 OFFICE O/P EST MOD 30 MIN: CPT | Performed by: FAMILY MEDICINE

## 2022-12-29 NOTE — PROGRESS NOTES
[facility]                Progress Note    Subjective:       Patient ID: Audrey Natarajan is a 52 y.o. female.    Chief Complaint: Wound Check    Follow up wound care visit. Patient sat on her knee scooter and rode to exam room declining to walk due to pain in her groin with friend at her side, wearing Darco shoes on bilateral feet. She c/o generalized pain rating as 10/10, but denies pain to wound bed. Dressing intact to Left Plantar Foot with a large amount of  sanguineous, non-malodor drainage. Left Plantar Foot wound measuring larger overal with tunneling at 11 o'clock and 5 o'clock. Patient reports having packing within wound bed, but no packing noted. Last noted from MD states wet to dry dressing applied. Stitiches (4) noted to still be intact with a large amount of callus ring around wound bed.      Patient reports needing refills on pain medications, advised to contact prescribing physician. Wound care done as per order. Will try for home health to see patient twice weekly until return to clinic in 1 week on Friday to see Podiatrist.      Review of Systems   Constitutional: Negative.    HENT: Negative.     Eyes: Negative.    Respiratory: Negative.     Cardiovascular: Negative.    Gastrointestinal: Negative.    Endocrine: Negative.    Musculoskeletal: Negative.    Skin:  Positive for wound.   All other systems reviewed and are negative.      Objective:        Physical Exam  Vitals reviewed.   Constitutional:       Appearance: She is well-developed.   HENT:      Head: Normocephalic and atraumatic.   Eyes:      Extraocular Movements: Extraocular movements intact.      Conjunctiva/sclera: Conjunctivae normal.      Pupils: Pupils are equal, round, and reactive to light.   Skin:     General: Skin is warm and dry.      Comments: See wound description for further information   Neurological:      Mental Status: She is alert and oriented to person, place, and time.    Psychiatric:         Mood and Affect: Mood normal.         Behavior: Behavior normal.       Vitals:    12/29/22 1133   BP: (!) 196/87   Pulse: (!) 111   Temp: 97.5 °F (36.4 °C)       Assessment:           ICD-10-CM ICD-9-CM   1. Left midfoot ulcer, with necrosis of muscle  L97.423 707.14     728.89            Wound 04/15/22 2230 Diabetic Ulcer Left medial;plantar Foot (Active)   04/15/22 2230    Pre-existing: Yes   Primary Wound Type: Diabetic ulc   Side: Left   Orientation: medial;plantar   Location: Foot   Wound Number:    Ankle-Brachial Index:    Pulses:    Removal Indication and Assessment:    Wound Outcome:    (Retired) Wound Type:    (Retired) Wound Length (cm):    (Retired) Wound Width (cm):    (Retired) Depth (cm):    Wound Description (Comments):    Removal Indications:    Wound WDL ex 12/29/22 1138   Dressing Appearance Intact;Moist drainage 12/29/22 1138   Drainage Amount Large 12/29/22 1138   Drainage Characteristics/Odor Serosanguineous;No odor 12/29/22 1138   Appearance Red;Yellow;Moist 12/29/22 1138   Tissue loss description Full thickness 12/29/22 1138   Black (%), Wound Tissue Color 0 % 12/29/22 1138   Red (%), Wound Tissue Color 90 % 12/29/22 1138   Yellow (%), Wound Tissue Color 10 % 12/29/22 1138   Periwound Area Pale white;Dry 12/29/22 1138   Wound Edges Callused;Defined;Open 12/29/22 1138   Wound Length (cm) 4 cm 12/29/22 1138   Wound Width (cm) 5 cm 12/29/22 1138   Wound Depth (cm) 2 cm 12/29/22 1138   Wound Volume (cm^3) 40 cm^3 12/29/22 1138   Wound Surface Area (cm^2) 20 cm^2 12/29/22 1138   Tunneling (depth (cm)/location) 2 cm @ 11 o'clock and 1 cm @ 5 o'clock 12/29/22 1138   Undermining (depth (cm)/location) 0 12/29/22 1138   Care Cleansed with:;Antimicrobial agent;Sterile normal saline 12/29/22 1138   Dressing Changed 12/29/22 1138   Packing packed with;gauze, iodoform 12/29/22 1138   Packing Inserted  1 12/29/22 1138   Off Loading Football dressing;Off loading shoe 12/29/22 1138    Dressing Change Due 01/06/23 12/29/22 4408           Plan:            Debridement not needed and Not Done today.   Continue off-loading / leg elevation   Continue eating protein   Keep dressing clean and intact  Continue with wound care orders and plan as noted in orders.   Continue to follow current medication regimen as per pcp   Call for any questions / concerns.     Tissue pathology and/or culture taken     [] Yes      [x] No  Sharp debridement performed                   [] Yes       [x] No  Labs ordered     [] Yes       [x] No  Imaging ordered    [] Yes      [x] No    No orders of the defined types were placed in this encounter.       No follow-ups on file.

## 2022-12-29 NOTE — TELEPHONE ENCOUNTER
----- Message from Fanny Naa sent at 12/29/2022 11:53 AM CST -----  Regarding: self [phone]  .Type: RX Refill Request    Who Called: self     Have you contacted your pharmacy: no    Refill or New Rx: refill     RX Name and Strength: traMADoL (ULTRAM) 50 mg tablet, LIDOcaine 5 % patch 1 patch,   LIDOcaine cream     Preferred Pharmacy with phone number: .  [id number] [address] [facility] [address] [facility] [place] [address]  Phone: [phone] Fax: [phone]    Local or Mail Order: local     Would the patient rather a call back or a response via [facility]?  Call back     Best Call Back Number:  [phone]

## 2023-01-03 ENCOUNTER — CLINICAL SUPPORT (OUTPATIENT)
Dept: REHABILITATION | Facility: HOSPITAL | Age: 53
End: 2023-01-03
Payer: MEDICAID

## 2023-01-03 DIAGNOSIS — M14.672 CHARCOT'S JOINT OF LEFT FOOT: ICD-10-CM

## 2023-01-03 DIAGNOSIS — R26.89 BALANCE PROBLEM: ICD-10-CM

## 2023-01-03 DIAGNOSIS — R29.898 DECREASED STRENGTH OF LOWER EXTREMITY: Primary | ICD-10-CM

## 2023-01-03 DIAGNOSIS — R26.9 GAIT ABNORMALITY: ICD-10-CM

## 2023-01-03 DIAGNOSIS — S91.302A OPEN WOUND OF LEFT FOOT, INITIAL ENCOUNTER: ICD-10-CM

## 2023-01-03 PROCEDURE — 97110 THERAPEUTIC EXERCISES: CPT | Mod: PN

## 2023-01-03 PROCEDURE — 97161 PT EVAL LOW COMPLEX 20 MIN: CPT | Mod: PN

## 2023-01-03 RX ORDER — TRAMADOL HYDROCHLORIDE 50 MG/1
50 TABLET ORAL EVERY 8 HOURS PRN
Qty: 21 TABLET | Refills: 0 | Status: ON HOLD | OUTPATIENT
Start: 2023-01-03 | End: 2023-01-17 | Stop reason: HOSPADM

## 2023-01-03 NOTE — PHYSICIAN QUERY
PT Name: Audrey Natarajan  MR #: [id number]     Documentation Clarification      CDS/: Anup Matta               Contact information:   [email or web address]       This form is a permanent document in the medical record.     Query Date: January 3, 2023    By submitting this query, we are merely seeking further clarification of documentation. Please utilize your independent clinical judgment when addressing the question(s) below.    The Medical Record reflects the following:    Supporting Clinical Findings Location in Medical Record   Procedure: Procedure(s) (LRB):  Incision and Drainage, Foot (Left)    ....Using a Jamshidi needle in a standard fashion bone from the medial midfoot was obtained and sent for path and micro. .....     12/24 Podiatry procedure;   Ariel Szymanski DPM /  Fahad Razo DPM                                                                                 Provider, please clarify the specific bone that was biopsied--associated with above clinical findings.    [  x ] Please specify what bone was biopsied:   ___medial cuneiform __________________     [  ] Clinically undetermined

## 2023-01-03 NOTE — PROGRESS NOTES
[facility]  Physical Therapy Initial Evaluation  Date: 1/3/2023   Name: Audrey Natarajan  Clinic Number: [id number]    Therapy Diagnosis:   Encounter Diagnoses   Name Primary?    Charcot's joint of left foot     Open wound of left foot, initial encounter      Physician: Velvet Galicia DO    Physician Orders: PT Eval and Treat   Medical Diagnosis from Referral: M14.672 (ICD-10-CM) - Charcot's joint of left foot S91.302A (ICD-10-CM) - Open wound of left foot, initial encounter   Evaluation Date: 1/3/2023  Authorization Period Expiration: 12/27/2023  Plan of Care Expiration: 4/3/2023  Visit # / Visits authorized: 1/ 1 Progress Note Due: 2/3/2023  FOTO: 1/ 1  HEP: Access Code: [email or web address]  Precautions: Diabetes, MRSA, blood and bodily fluid, Fall, Weightbearing, and Immunosuppression - non weight bearing on LLE; diabetic pressure ulcer on plantar surface of L foot    Time In: 1230  Time Out: 1315  Total Appointment Time (timed & untimed codes): 45 minutes    Subjective   Date of onset: chronic   History of current condition - Audrey reports: pt is currently in wound care at hospital for R posterior heel wound. She is taking oral antibiotics to address MRSA/cellulitis infection. Pt admits she is primarily bed bound 2/2 pain. She reports constitutional symptoms today and is uncertain if she is coming down with the flu. She states she has been in and out of the hospital for 6yrs dealing with the ulcer. Pt uses knee scooter to sit on and dig heels in to ground to pull herself forward. Pt states she is keeping an eye on the ulcer and states there is decreased redness and swelling present surrounding the wound bed/involved tissue but it still smells and leaks a lot. Pt returns to hospital for wound care of Friday, Jan 6, 2023. Pt has been using ice and elevation to alleviate pain since the tramadol doesn't decrease the pain.   Pt states that she hasn't been able to bathe since March due to  wound on her foot so she tries to wash her hair in the sink and uses hygiene wipes for other areas, especially where there are skin folds.     GONSALO  Pain constant or intermittent: constant        Current 9/10, worst 10/10, best 8/10   Location: left feet   Description: Aching, Burning, Throbbing, and Deep  Aggravating Factors: Sitting, Standing, Walking, and Getting out of bed/chair  Easing Factors: relaxation, ice, and elevation    Prior Therapy: no  Social History:  lives alone  Occupation: disability      Pts goals: prepare feet for structural surgery to repair foot deformity      Imaging: MRI studies: Impression:  Large infectious/inflammatory phlegmon at the plantar aspect of the midfoot with multiple small interconnecting abscesses.  Advanced Charcot arthropathy throughout the midfoot and probably hindfoot.  Given the proximity to the ulcer and inflammatory phlegmon, early septic arthritis and osteomyelitis cannot be excluded.  Electronically signed by: Tu Restrepo Jr          Medical History:   Past Medical History:   Diagnosis Date    ADHD (attention deficit hyperactivity disorder)     Arthritis     Asthma     Bipolar 1 disorder     Cataract     Cigarette smoker     COPD (chronic obstructive pulmonary disease)     Coronary artery disease     A fib    Depression     bipolar manic depresson    Diabetes mellitus     Diabetic foot ulcers     Diabetic neuropathy     DVT of lower extremity, bilateral 07/2013    bilateral LE DVT. Estelita filter placed.     Encounter for blood transfusion     History of blood clots 1. Left Leg=2003; 2.Bilateral Groin=Blood Clots= 5 or 6/ 2013 & 7/2013; 3. LLL of Lung=7/2013;  4. Lt. Lower Leg=7/2013.     Pt. had 1st Blood Clot after Tfaubxktzetm=2870, & Last=2013. Estelita Filter= Rt.Lateral Neck.    HTN (hypertension) 06/06/2013    Pt states that she does not have hypertension    Hypercholesteremia     Irregular heartbeat     Neuromuscular disorder     neuropathy feet     Obese     PE (pulmonary embolism) 07/2013    bilat LE DVT.     Restless leg syndrome        Surgical History:   Audrey Natarajan  has a past surgical history that includes Splenectomy, total (July 2003); Vein Surgery (2003); Green' s filter (Right, 7/4/2012); Tonsillectomy; Abdominal surgery (2010); Ovarian cyst removal (3/13/2014); Hernia repair; Bilateral oophorectomy (Bilateral, 1/12/2015); pr removal of ovary/tube(s); Tympanostomy tube placement (1976); Laparoscopic cholecystectomy (N/A, 9/10/2020); Cholecystectomy; Debridement of foot (Bilateral, 5/10/2022); and Incision and drainage foot (Left, 12/24/2022).    Medications:   Audrey has a current medication list which includes the following prescription(s): acetaminophen, albuterol, albuterol-ipratropium, ammonium lactate, apixaban, aspirin, binaxnow covid-19 ag self test, bumetanide, carbamazepine, divalproex, divalproex, dupixent pen, fluticasone propionate, fluticasone-salmeterol 250-50 mcg/dose, gabapentin, hydrocodone-acetaminophen, hydroxyzine, lisinopril, loratadine, metformin, methocarbamol, metoprolol tartrate, multivitamin, nystatin, pantoprazole, potassium chloride, pravastatin, risperidone, ozempic, sulfamethoxazole-trimethoprim 800-160mg, tramadol, triamcinolone acetonide 0.025%, vyvanse, [DISCONTINUED] diclofenac sodium, [DISCONTINUED] furosemide, and [DISCONTINUED] quetiapine.    Allergies:   Review of patient's allergies indicates:   Allergen Reactions    Morphine Other (See Comments)     Patient had a psychotic episode after taking Morphine  Agitation, hallucinations    Penicillins Anaphylaxis     Tolerated cephalosporins in the past    Januvia [sitagliptin] Hives    Carbamazepine Other (See Comments)     hyponatremia              Objective     Observation: uses knee scooter as a chair and propels herself forward by digging in heels to floor and pulling herself forward; upon removal of medical walking shoe on L there was an immediate odor  from wound bed as well as visual leakage from bandages    Sensation: Light touch:impaired to light touch t/o BLE    GAIT DEVIATIONS: Audrey displays pt does not ambulate 2/2 weight bearing precautions from open wound on plantar surface of L foot    Ankle Range of Motion:   Ankle Right Left   Dorsiflexion Lacking 20deg to neutral Lacking 30deg to neutral      Plantarflexion 40 40   Inversion 10 10   Eversion 5 0      Strength:   Right Ankle   Left Ankle    Dorsiflexion: NT Dorsiflexion: NT   Plantarflexion:  NT Plantarflexion: NT   Inversion: NT Inversion: NT   Eversion: NT Eversion: NT         Joint Mobility: hypo t/o B subtalar and talonavicular            TREATMENT     Total Treatment time separate from Evaluation: 10 minutes    Audrey received therapeutic exercises to develop strength, endurance, ROM, flexibility, posture, and core stabilization for 10 minutes including:  Long sitting gastroc/soleus stretch 2 x30sec B  Ankle 4-way no resistance x10 ea B  OKC ankle pumps x20 B  OKC inversion/eversion x10 B  SAQ x 10 B  LAQ x10B      Home Exercises and Patient Education Provided    Education provided:   - HEP    Written Home Exercises Provided: yes.  Exercises were reviewed and Audrey was able to demonstrate them prior to the end of the session.  Audrey demonstrated fair  understanding of the education provided.     See EMR under Patient Instructions for exercises provided 1/3/2023.    Assessment   Audrey is a 52 y.o. female referred to outpatient Physical Therapy with a medical diagnosis of M14.672 (ICD-10-CM) - Charcot's joint of left foot S91.302A (ICD-10-CM) - Open wound of left foot, initial encounter . Pt presents with diminished function t/o BLE, noted weakness and loss of ROM, inability to tolerate ambulation and/or standing 2/2 foot deformity and presence of L plantar ulcer. Pt is not a good candidate for outpatient physical therapy at this time and DPT recommended home health which pt is attempting to  initiate with referral from MD. DPT recommended returning every 3wks for progressions to HEP for general strengthening of BLE. Pt is in agreement with POC.     Pt prognosis is Guarded.   Pt will benefit from skilled outpatient Physical Therapy to address the deficits stated above and in the chart below, provide pt/family education, and to maximize pt's level of independence.     Plan of care discussed with patient: Yes  Pt's spiritual, cultural and educational needs considered and patient is agreeable to the plan of care and goals as stated below:     Anticipated Barriers for therapy: chronicity of ulcer/structural deformity     Medical Necessity is demonstrated by the following  History  Co-morbidities and personal factors that may impact the plan of care Co-morbidities:   COPD/asthma, coping style/mechanism, diabetes, difficulty sleeping, excessive commute time/distance, financial considerations, high BMI, HTN, immunosuppression, level of undertstanding of current condition, poor medication/medical compliance, and PAD, cellulitis/MRSA/osteomyelitis of L foot, Hx of DVT and PE     Personal Factors:   coping style  lifestyle     Complexity: low   Examination  Body Structures and Functions, activity limitations and participation restrictions that may impact the plan of care Body Regions:   lower extremities    Body Systems:    gross symmetry  ROM  strength  gross coordinated movement  balance  gait  transfers  transitions  motor control  motor learning  edema  scar formation  skin integrity  skin color    Participation Restrictions:   Non weight bearing to LLE    Activity limitations:   Learning and applying knowledge  no deficits    General Tasks and Commands  no deficits    Communication  no deficits    Mobility  lifting and carrying objects  walking  moving around using equipment (WC)  using transportation (bus, train, plane, car)  driving (bike, car, motorcycle)    Self care  washing oneself (bathing, drying,  washing hands)  caring for body parts (brushing teeth, shaving, grooming)  toileting  dressing  eating  drinking  looking after one's health    Domestic Life  shopping  cooking  doing house work (cleaning house, washing dishes, laundry)  assisting others    Interactions/Relationships  no deficits    Life Areas  no deficits    Community and Social Life  community life  recreation and leisure         Complexity: low  See FOTO outcome assessment   Clinical Presentation evolving clinical presentation with changing clinical characteristics low   Decision Making/ Complexity Score: low     Goals:  GOALS: Short Term Goals:  4 weeks  1.Report decreased  in  pain at worse less than  <   / =  7  /10  to increase tolerance for improved functional actvities. On going  2. Pt to improve range of motion by 10% to allow for improved functional mobility to allow for improvement in IADLs. On going  3. Increased BLE MMT 1/2 grade  to increase tolerance for ADL and work activities.On going  4.Pt to tolerate HEP to improve ROM and independence with ADL's. On going    Long Term Goals: 8 weeks  1.Report decreased  in  pain at worse  less than  <   / =  6  /10  to increase tolerance for improved functional actvities. On going  2.Pt to improve range of motion by 20% to allow for improved functional mobility to allow for improvement in IADLs. On going  3.Increased BLE MMT 1 grade  to increase tolerance for ADL and work activities.On going  4.Pt to be Independent with HEP to improve ROM and independence with ADL's. On going      Plan   Plan of care Certification: 1/3/2023 to 4/3/2023.    Outpatient Physical Therapy 1 times week every 3wks  to include the following interventions: Patient Education, Self Care, and Therapeutic Exercise.     Esperanza Pena, PT, DPT, Cert DN       I CERTIFY THE NEED FOR THESE SERVICES FURNISHED UNDER THIS PLAN OF TREATMENT AND WHILE UNDER MY CARE   Physician's comments:     Physician's Signature:  ___________________________________________________

## 2023-01-03 NOTE — PHYSICIAN QUERY
"PT Name: Audrey Natarajan  MR #: [id number]    DOCUMENTATION CLARIFICATION     CDS/: Anup Matta RN, CCDS         Contact information:   [email or web address]     This form is a permanent document in the medical record.    Query Date: January 3, 2023  By submitting this query, we are merely seeking further clarification of documentation. Please utilize your independent clinical judgment when addressing the question(s) below.    The Medical Record contains the following:   Indicator Supporting Clinical Findings Location in Medical Record    Documentation of "Debridement" 12/24 podiatry: Procedure: Procedure(s) (LRB):    INCISION AND DRAINAGE, FOOT (Left)     --Diabetic ulcer of left midfoot associated with type 2 diabetes mellitus, limited to breakdown of skin     --- Attention was directed to the  left plantar foot. Using a 15 blade, an incision was made vertically over the plantar ulceration extending 2 cm distal and proximal to the wound.  --The portion of wound base adjacent to the central track mentioned in operative findings appeared fibrotic, non viable, and impeding to formation of healthy tissue. Rongeur was used to debride this tissue back to the level of healthier bleeding tissue...   12/24 Podiatry procedure;   Ariel Szymanski DPM /  Fahad Razo DPM     Documentation of "I&D"      Other       Excisional debridement is the surgical removal or cutting away of such tissue, necrosis, or slough and is classified to the root operation "Excision." Use of a sharp instrument does not always indicate that an excisional debridement was performed. Minor removal of loose fragments with scissors or using a sharp instrument to scrape away tissue is not an excisional debridement. Excisional debridement involves the use of a scalpel to remove devitalized tissue.  Nonexcisional debridement is the nonoperative brushing, irrigating, scrubbing, or washing of devitalized tissue, necrosis, slough, or foreign " "material. Most nonexcisional debridement procedures are classified to the root operation "Extraction" (pulling or stripping out or off all or a portion of a body part by the use of force).     Provider, please provide further clarification on the procedure performed on ___left plantar wound     :    [   ] Excisional Debridement of skin   [   ] Excisional Debridement of subcutaneous tissue/fascia   [  x ] Excisional Debridement of muscle   [   ] Excisional Debridement of tendon   [   ] Excisional Debridement of bone        [   ] Other Procedure (please specify): _____________   [  ] Clinically Undetermined     Reference:    ICD-10-CM/PCS Coding Clinic Third Quarter ICD-10, Effective with discharges: October 7, 2015 [facility] § Excisional and nonexcisional debridement (2015).    Form No. 05768   "

## 2023-01-04 ENCOUNTER — OFFICE VISIT (OUTPATIENT)
Dept: FAMILY MEDICINE | Facility: CLINIC | Age: 53
End: 2023-01-04
Payer: MEDICAID

## 2023-01-04 VITALS
RESPIRATION RATE: 18 BRPM | TEMPERATURE: 99 F | DIASTOLIC BLOOD PRESSURE: 63 MMHG | SYSTOLIC BLOOD PRESSURE: 90 MMHG | WEIGHT: 246.25 LBS | OXYGEN SATURATION: 97 % | HEART RATE: 70 BPM | BODY MASS INDEX: 39.75 KG/M2

## 2023-01-04 DIAGNOSIS — M14.672 CHARCOT'S JOINT OF LEFT FOOT: Primary | ICD-10-CM

## 2023-01-04 DIAGNOSIS — I10 ESSENTIAL HYPERTENSION: Chronic | ICD-10-CM

## 2023-01-04 DIAGNOSIS — L97.509 TYPE 2 DIABETES MELLITUS WITH FOOT ULCER, WITHOUT LONG-TERM CURRENT USE OF INSULIN: ICD-10-CM

## 2023-01-04 DIAGNOSIS — E11.621 TYPE 2 DIABETES MELLITUS WITH FOOT ULCER, WITHOUT LONG-TERM CURRENT USE OF INSULIN: ICD-10-CM

## 2023-01-04 DIAGNOSIS — F17.200 TOBACCO DEPENDENCE: ICD-10-CM

## 2023-01-04 DIAGNOSIS — Z12.11 SCREENING FOR COLON CANCER: ICD-10-CM

## 2023-01-04 PROCEDURE — 1160F RVW MEDS BY RX/DR IN RCRD: CPT | Mod: CPTII,,, | Performed by: INTERNAL MEDICINE

## 2023-01-04 PROCEDURE — 3008F PR BODY MASS INDEX (BMI) DOCUMENTED: ICD-10-PCS | Mod: CPTII,,, | Performed by: INTERNAL MEDICINE

## 2023-01-04 PROCEDURE — 1111F DSCHRG MED/CURRENT MED MERGE: CPT | Mod: CPTII,,, | Performed by: INTERNAL MEDICINE

## 2023-01-04 PROCEDURE — 1159F MED LIST DOCD IN RCRD: CPT | Mod: CPTII,,, | Performed by: INTERNAL MEDICINE

## 2023-01-04 PROCEDURE — 3078F DIAST BP <80 MM HG: CPT | Mod: CPTII,,, | Performed by: INTERNAL MEDICINE

## 2023-01-04 PROCEDURE — 3008F BODY MASS INDEX DOCD: CPT | Mod: CPTII,,, | Performed by: INTERNAL MEDICINE

## 2023-01-04 PROCEDURE — 1160F PR REVIEW ALL MEDS BY PRESCRIBER/CLIN PHARMACIST DOCUMENTED: ICD-10-PCS | Mod: CPTII,,, | Performed by: INTERNAL MEDICINE

## 2023-01-04 PROCEDURE — 1159F PR MEDICATION LIST DOCUMENTED IN MEDICAL RECORD: ICD-10-PCS | Mod: CPTII,,, | Performed by: INTERNAL MEDICINE

## 2023-01-04 PROCEDURE — 99999 PR PBB SHADOW E&M-EST. PATIENT-LVL V: ICD-10-PCS | Mod: PBBFAC,,, | Performed by: INTERNAL MEDICINE

## 2023-01-04 PROCEDURE — 3074F PR MOST RECENT SYSTOLIC BLOOD PRESSURE < 130 MM HG: ICD-10-PCS | Mod: CPTII,,, | Performed by: INTERNAL MEDICINE

## 2023-01-04 PROCEDURE — 99999 PR PBB SHADOW E&M-EST. PATIENT-LVL V: CPT | Mod: PBBFAC,,, | Performed by: INTERNAL MEDICINE

## 2023-01-04 PROCEDURE — 1111F PR DISCHARGE MEDS RECONCILED W/ CURRENT OUTPATIENT MED LIST: ICD-10-PCS | Mod: CPTII,,, | Performed by: INTERNAL MEDICINE

## 2023-01-04 PROCEDURE — 99214 PR OFFICE/OUTPT VISIT, EST, LEVL IV, 30-39 MIN: ICD-10-PCS | Mod: S$PBB,,, | Performed by: INTERNAL MEDICINE

## 2023-01-04 PROCEDURE — 3074F SYST BP LT 130 MM HG: CPT | Mod: CPTII,,, | Performed by: INTERNAL MEDICINE

## 2023-01-04 PROCEDURE — 99215 OFFICE O/P EST HI 40 MIN: CPT | Mod: PBBFAC,PN | Performed by: INTERNAL MEDICINE

## 2023-01-04 PROCEDURE — 99214 OFFICE O/P EST MOD 30 MIN: CPT | Mod: S$PBB,,, | Performed by: INTERNAL MEDICINE

## 2023-01-04 PROCEDURE — 3078F PR MOST RECENT DIASTOLIC BLOOD PRESSURE < 80 MM HG: ICD-10-PCS | Mod: CPTII,,, | Performed by: INTERNAL MEDICINE

## 2023-01-04 RX ORDER — METOPROLOL TARTRATE 25 MG/1
25 TABLET, FILM COATED ORAL 2 TIMES DAILY
Qty: 180 TABLET | Refills: 3 | Status: SHIPPED | OUTPATIENT
Start: 2023-01-04 | End: 2023-02-06 | Stop reason: SDUPTHER

## 2023-01-04 NOTE — PROGRESS NOTES
Subjective:       Patient ID: Audrey Natarajan is a 52 y.o. female.    Chief Complaint: Follow-up (3 month f/u) and Foot Swelling    Hospital follow up    HPI: 51 y/o w/ bipolar disorder HTN afib on nOAC left foot ulcer presents alone for follow up recent hospitalization for cellulitis of left plantar foot. Underwent surgical debridement MRI w/o evidence of osteomyelitis treatd with bactrim DS (to complete 10 day course tomorrow) doing self dressing changes one to two times per day no fevers/chills no loose stool. She is using knee scooter to keep foot offloaded. She is experience pain to periwound with relief using tramadol    Review of Systems   Constitutional:  Negative for activity change, appetite change, fatigue, fever and unexpected weight change.   HENT:  Negative for ear pain, rhinorrhea and sore throat.    Eyes:  Negative for discharge and visual disturbance.   Respiratory:  Negative for chest tightness, shortness of breath and wheezing.    Cardiovascular:  Negative for chest pain, palpitations and leg swelling.   Gastrointestinal:  Negative for abdominal pain, constipation and diarrhea.   Endocrine: Negative for cold intolerance and heat intolerance.   Genitourinary:  Negative for dysuria and hematuria.   Musculoskeletal:  Negative for joint swelling and neck stiffness.   Skin:  Positive for wound. Negative for rash.   Neurological:  Negative for dizziness, syncope, weakness and headaches.   Psychiatric/Behavioral:  Negative for suicidal ideas.      Objective:     Vitals:    01/04/23 0847   BP: 90/63   Pulse: 70   Resp: 18   Temp: 98.5 °F (36.9 °C)   TempSrc: Oral   SpO2: 97%   Weight: 111.7 kg (246 lb 4.1 oz)          Physical Exam  Constitutional:       General: She is not in acute distress.     Appearance: She is well-developed. She is obese.   HENT:      Head: Normocephalic and atraumatic.   Eyes:      Conjunctiva/sclera: Conjunctivae normal.   Cardiovascular:      Rate and Rhythm: Normal rate and  regular rhythm.      Heart sounds: No murmur heard.    No friction rub. No gallop.   Pulmonary:      Effort: Pulmonary effort is normal.      Breath sounds: Normal breath sounds. No wheezing or rales.   Abdominal:      General: There is no distension.      Palpations: Abdomen is soft.      Tenderness: There is no abdominal tenderness. There is no guarding or rebound.   Musculoskeletal:         General: No tenderness. Normal range of motion.      Cervical back: Normal range of motion.   Skin:     General: Skin is warm and dry.      Comments: Left foot wrapped without strike through drainage   Neurological:      Mental Status: She is alert and oriented to person, place, and time.      Cranial Nerves: No cranial nerve deficit.       Assessment and Plan   1. Essential hypertension  BP at low end of normal decrease beta blocker  - metoprolol tartrate (LOPRESSOR) 25 MG tablet; Take 1 tablet (25 mg total) by mouth 2 (two) times daily.  Dispense: 180 tablet; Refill: 3    2. Charcot's joint of left foot  Off loading followed weekly in wound care    3. Type 2 diabetes mellitus with foot ulcer, without long-term current use of insulin  Over due for DFE, continue metformin and glipizide recent a1c just at goal  - Ambulatory referral/consult to Optometry; Future    4. Screening for colon cancer  Lost her last fit kit resubmit  - Fecal Immunochemical Test (iFOBT); Future    5. Tobacco dependence  Referral for smoking cessation assistance  - Ambulatory referral/consult to Smoking Cessation Program; Future

## 2023-01-06 ENCOUNTER — HOSPITAL ENCOUNTER (OUTPATIENT)
Dept: WOUND CARE | Facility: HOSPITAL | Age: 53
Discharge: HOME | End: 2023-01-06
Attending: PODIATRIST
Payer: MEDICAID

## 2023-01-06 ENCOUNTER — TELEPHONE (OUTPATIENT)
Dept: WOUND CARE | Facility: HOSPITAL | Age: 53
End: 2023-01-06
Payer: MEDICAID

## 2023-01-06 VITALS
HEART RATE: 77 BPM | HEIGHT: 66 IN | DIASTOLIC BLOOD PRESSURE: 61 MMHG | BODY MASS INDEX: 39.53 KG/M2 | TEMPERATURE: 98 F | WEIGHT: 246 LBS | SYSTOLIC BLOOD PRESSURE: 131 MMHG

## 2023-01-06 DIAGNOSIS — M14.672 CHARCOT'S JOINT OF LEFT FOOT: ICD-10-CM

## 2023-01-06 DIAGNOSIS — M79.89 PAIN AND SWELLING OF LEFT LOWER LEG: ICD-10-CM

## 2023-01-06 DIAGNOSIS — M79.662 PAIN AND SWELLING OF LEFT LOWER LEG: ICD-10-CM

## 2023-01-06 DIAGNOSIS — S91.302A OPEN WOUND OF LEFT FOOT: ICD-10-CM

## 2023-01-06 DIAGNOSIS — E11.49 TYPE II DIABETES MELLITUS WITH NEUROLOGICAL MANIFESTATIONS: ICD-10-CM

## 2023-01-06 DIAGNOSIS — L97.423 LEFT MIDFOOT ULCER, WITH NECROSIS OF MUSCLE: Primary | ICD-10-CM

## 2023-01-06 LAB — G6PD RBC-CCNT: NORMAL

## 2023-01-06 PROCEDURE — 11045 DBRDMT SUBQ TISS EACH ADDL: CPT

## 2023-01-06 PROCEDURE — 11045 DBRDMT SUBQ TISS EACH ADDL: CPT | Mod: ,,, | Performed by: PODIATRIST

## 2023-01-06 PROCEDURE — 11045 WOUND DEBRIDEMENT: ICD-10-PCS | Mod: ,,, | Performed by: PODIATRIST

## 2023-01-06 PROCEDURE — 99499 NO LOS: ICD-10-PCS | Mod: ,,, | Performed by: PODIATRIST

## 2023-01-06 PROCEDURE — 11042 DBRDMT SUBQ TIS 1ST 20SQCM/<: CPT | Mod: ,,, | Performed by: PODIATRIST

## 2023-01-06 PROCEDURE — 11042 DBRDMT SUBQ TIS 1ST 20SQCM/<: CPT

## 2023-01-06 PROCEDURE — 11042 DBRDMT SUBQ TIS 1ST 20SQCM/<: CPT | Performed by: PODIATRIST

## 2023-01-06 PROCEDURE — 11042 WOUND DEBRIDEMENT: ICD-10-PCS | Mod: ,,, | Performed by: PODIATRIST

## 2023-01-06 PROCEDURE — 99499 UNLISTED E&M SERVICE: CPT | Mod: ,,, | Performed by: PODIATRIST

## 2023-01-06 NOTE — PLAN OF CARE
[facility]  Physical Therapy Initial Evaluation  Date: 1/3/2023   Name: Audrey Natarajan  Clinic Number: [id number]    Therapy Diagnosis:   Encounter Diagnoses   Name Primary?    Charcot's joint of left foot     Open wound of left foot, initial encounter      Physician: Velvet Galicia DO    Physician Orders: PT Eval and Treat   Medical Diagnosis from Referral: M14.672 (ICD-10-CM) - Charcot's joint of left foot S91.302A (ICD-10-CM) - Open wound of left foot, initial encounter   Evaluation Date: 1/3/2023  Authorization Period Expiration: 12/27/2023  Plan of Care Expiration: 4/3/2023  Visit # / Visits authorized: 1/ 1 Progress Note Due: 2/3/2023  FOTO: 1/ 1  HEP: Access Code: [email or web address]  Precautions: Diabetes, MRSA, blood and bodily fluid, Fall, Weightbearing, and Immunosuppression - non weight bearing on LLE; diabetic pressure ulcer on plantar surface of L foot    Time In: 1230  Time Out: 1315  Total Appointment Time (timed & untimed codes): 45 minutes    Subjective   Date of onset: chronic   History of current condition - Audrey reports: pt is currently in wound care at hospital for R posterior heel wound. She is taking oral antibiotics to address MRSA/cellulitis infection. Pt admits she is primarily bed bound 2/2 pain. She reports constitutional symptoms today and is uncertain if she is coming down with the flu. She states she has been in and out of the hospital for 6yrs dealing with the ulcer. Pt uses knee scooter to sit on and dig heels in to ground to pull herself forward. Pt states she is keeping an eye on the ulcer and states there is decreased redness and swelling present surrounding the wound bed/involved tissue but it still smells and leaks a lot. Pt returns to hospital for wound care of Friday, Jan 6, 2023. Pt has been using ice and elevation to alleviate pain since the tramadol doesn't decrease the pain.   Pt states that she hasn't been able to bathe since March due to  wound on her foot so she tries to wash her hair in the sink and uses hygiene wipes for other areas, especially where there are skin folds.     GONSALO  Pain constant or intermittent: constant        Current 9/10, worst 10/10, best 8/10   Location: left feet   Description: Aching, Burning, Throbbing, and Deep  Aggravating Factors: Sitting, Standing, Walking, and Getting out of bed/chair  Easing Factors: relaxation, ice, and elevation    Prior Therapy: no  Social History:  lives alone  Occupation: disability      Pts goals: prepare feet for structural surgery to repair foot deformity      Imaging: MRI studies: Impression:  Large infectious/inflammatory phlegmon at the plantar aspect of the midfoot with multiple small interconnecting abscesses.  Advanced Charcot arthropathy throughout the midfoot and probably hindfoot.  Given the proximity to the ulcer and inflammatory phlegmon, early septic arthritis and osteomyelitis cannot be excluded.  Electronically signed by: Tu Restrepo Jr          Medical History:   Past Medical History:   Diagnosis Date    ADHD (attention deficit hyperactivity disorder)     Arthritis     Asthma     Bipolar 1 disorder     Cataract     Cigarette smoker     COPD (chronic obstructive pulmonary disease)     Coronary artery disease     A fib    Depression     bipolar manic depresson    Diabetes mellitus     Diabetic foot ulcers     Diabetic neuropathy     DVT of lower extremity, bilateral 07/2013    bilateral LE DVT. Estelita filter placed.     Encounter for blood transfusion     History of blood clots 1. Left Leg=2003; 2.Bilateral Groin=Blood Clots= 5 or 6/ 2013 & 7/2013; 3. LLL of Lung=7/2013;  4. Lt. Lower Leg=7/2013.     Pt. had 1st Blood Clot after Sfxkjffivrwn=2093, & Last=2013. Estelita Filter= Rt.Lateral Neck.    HTN (hypertension) 06/06/2013    Pt states that she does not have hypertension    Hypercholesteremia     Irregular heartbeat     Neuromuscular disorder     neuropathy feet     Obese     PE (pulmonary embolism) 07/2013    bilat LE DVT.     Restless leg syndrome        Surgical History:   Audrey Natarajan  has a past surgical history that includes Splenectomy, total (July 2003); Vein Surgery (2003); Green' s filter (Right, 7/4/2012); Tonsillectomy; Abdominal surgery (2010); Ovarian cyst removal (3/13/2014); Hernia repair; Bilateral oophorectomy (Bilateral, 1/12/2015); pr removal of ovary/tube(s); Tympanostomy tube placement (1976); Laparoscopic cholecystectomy (N/A, 9/10/2020); Cholecystectomy; Debridement of foot (Bilateral, 5/10/2022); and Incision and drainage foot (Left, 12/24/2022).    Medications:   Audrey has a current medication list which includes the following prescription(s): acetaminophen, albuterol, albuterol-ipratropium, ammonium lactate, apixaban, aspirin, binaxnow covid-19 ag self test, bumetanide, carbamazepine, divalproex, divalproex, dupixent pen, fluticasone propionate, fluticasone-salmeterol 250-50 mcg/dose, gabapentin, hydrocodone-acetaminophen, hydroxyzine, lisinopril, loratadine, metformin, methocarbamol, metoprolol tartrate, multivitamin, nystatin, pantoprazole, potassium chloride, pravastatin, risperidone, ozempic, sulfamethoxazole-trimethoprim 800-160mg, tramadol, triamcinolone acetonide 0.025%, vyvanse, [DISCONTINUED] diclofenac sodium, [DISCONTINUED] furosemide, and [DISCONTINUED] quetiapine.    Allergies:   Review of patient's allergies indicates:   Allergen Reactions    Morphine Other (See Comments)     Patient had a psychotic episode after taking Morphine  Agitation, hallucinations    Penicillins Anaphylaxis     Tolerated cephalosporins in the past    Januvia [sitagliptin] Hives    Carbamazepine Other (See Comments)     hyponatremia              Objective     Observation: uses knee scooter as a chair and propels herself forward by digging in heels to floor and pulling herself forward; upon removal of medical walking shoe on L there was an immediate odor  from wound bed as well as visual leakage from bandages    Sensation: Light touch:impaired to light touch t/o BLE    GAIT DEVIATIONS: Audrey displays pt does not ambulate 2/2 weight bearing precautions from open wound on plantar surface of L foot    Ankle Range of Motion:   Ankle Right Left   Dorsiflexion Lacking 20deg to neutral Lacking 30deg to neutral      Plantarflexion 40 40   Inversion 10 10   Eversion 5 0      Strength:   Right Ankle   Left Ankle    Dorsiflexion: NT Dorsiflexion: NT   Plantarflexion:  NT Plantarflexion: NT   Inversion: NT Inversion: NT   Eversion: NT Eversion: NT         Joint Mobility: hypo t/o B subtalar and talonavicular            TREATMENT     Total Treatment time separate from Evaluation: 10 minutes    Audrey received therapeutic exercises to develop strength, endurance, ROM, flexibility, posture, and core stabilization for 10 minutes including:  Long sitting gastroc/soleus stretch 2 x30sec B  Ankle 4-way no resistance x10 ea B  OKC ankle pumps x20 B  OKC inversion/eversion x10 B  SAQ x 10 B  LAQ x10B      Home Exercises and Patient Education Provided    Education provided:   - HEP    Written Home Exercises Provided: yes.  Exercises were reviewed and Audrey was able to demonstrate them prior to the end of the session.  Audrey demonstrated fair  understanding of the education provided.     See EMR under Patient Instructions for exercises provided 1/3/2023.    Assessment   Audrey is a 52 y.o. female referred to outpatient Physical Therapy with a medical diagnosis of M14.672 (ICD-10-CM) - Charcot's joint of left foot S91.302A (ICD-10-CM) - Open wound of left foot, initial encounter . Pt presents with diminished function t/o BLE, noted weakness and loss of ROM, inability to tolerate ambulation and/or standing 2/2 foot deformity and presence of L plantar ulcer. Pt is not a good candidate for outpatient physical therapy at this time and DPT recommended home health which pt is attempting to  initiate with referral from MD. DPT recommended returning every 3wks for progressions to HEP for general strengthening of BLE. Pt is in agreement with POC.     Pt prognosis is Guarded.   Pt will benefit from skilled outpatient Physical Therapy to address the deficits stated above and in the chart below, provide pt/family education, and to maximize pt's level of independence.     Plan of care discussed with patient: Yes  Pt's spiritual, cultural and educational needs considered and patient is agreeable to the plan of care and goals as stated below:     Anticipated Barriers for therapy: chronicity of ulcer/structural deformity     Medical Necessity is demonstrated by the following  History  Co-morbidities and personal factors that may impact the plan of care Co-morbidities:   COPD/asthma, coping style/mechanism, diabetes, difficulty sleeping, excessive commute time/distance, financial considerations, high BMI, HTN, immunosuppression, level of undertstanding of current condition, poor medication/medical compliance, and PAD, cellulitis/MRSA/osteomyelitis of L foot, Hx of DVT and PE     Personal Factors:   coping style  lifestyle     Complexity: low   Examination  Body Structures and Functions, activity limitations and participation restrictions that may impact the plan of care Body Regions:   lower extremities    Body Systems:    gross symmetry  ROM  strength  gross coordinated movement  balance  gait  transfers  transitions  motor control  motor learning  edema  scar formation  skin integrity  skin color    Participation Restrictions:   Non weight bearing to LLE    Activity limitations:   Learning and applying knowledge  no deficits    General Tasks and Commands  no deficits    Communication  no deficits    Mobility  lifting and carrying objects  walking  moving around using equipment (WC)  using transportation (bus, train, plane, car)  driving (bike, car, motorcycle)    Self care  washing oneself (bathing, drying,  washing hands)  caring for body parts (brushing teeth, shaving, grooming)  toileting  dressing  eating  drinking  looking after one's health    Domestic Life  shopping  cooking  doing house work (cleaning house, washing dishes, laundry)  assisting others    Interactions/Relationships  no deficits    Life Areas  no deficits    Community and Social Life  community life  recreation and leisure         Complexity: low  See FOTO outcome assessment   Clinical Presentation evolving clinical presentation with changing clinical characteristics low   Decision Making/ Complexity Score: low     Goals:  GOALS: Short Term Goals:  4 weeks  1.Report decreased  in  pain at worse less than  <   / =  7  /10  to increase tolerance for improved functional actvities. On going  2. Pt to improve range of motion by 10% to allow for improved functional mobility to allow for improvement in IADLs. On going  3. Increased BLE MMT 1/2 grade  to increase tolerance for ADL and work activities.On going  4.Pt to tolerate HEP to improve ROM and independence with ADL's. On going    Long Term Goals: 8 weeks  1.Report decreased  in  pain at worse  less than  <   / =  6  /10  to increase tolerance for improved functional actvities. On going  2.Pt to improve range of motion by 20% to allow for improved functional mobility to allow for improvement in IADLs. On going  3.Increased BLE MMT 1 grade  to increase tolerance for ADL and work activities.On going  4.Pt to be Independent with HEP to improve ROM and independence with ADL's. On going      Plan   Plan of care Certification: 1/3/2023 to 4/3/2023.    Outpatient Physical Therapy 1 times week every 3wks  to include the following interventions: Patient Education, Self Care, and Therapeutic Exercise.     Esperanza Pena, PT, DPT, Cert DN       I CERTIFY THE NEED FOR THESE SERVICES FURNISHED UNDER THIS PLAN OF TREATMENT AND WHILE UNDER MY CARE   Physician's comments:     Physician's Signature:  ___________________________________________________

## 2023-01-06 NOTE — PROGRESS NOTES
"[facility]                Progress Note    Subjective:       Patient ID: Audrey Natarajan is a 52 y.o. female.    Chief Complaint: Wound Check    To clinic on scooter which he states inside her trailer at all times. She relates that due to significant left knee pain she has not been able to use knee scooter as intended and has been riding scooter seated. She says that she "pees in a bucket in bedroom as BR is 45 feet away and only go there when I poop". States spends all day in bed. When asked if continues to smoke she said yes and goes outside to smoke. Has almost finished abx from hospital which she was only given for 10 days. Her family and friends do all cooking and shopping for her but wound is bigger by cm length and width with only one tunnel felt at 1100 and has now doubled in size to 4 cm.     Review of Systems   Constitutional:  Positive for activity change and fatigue. Negative for appetite change and fever.   Respiratory:  Positive for cough (smoker). Negative for shortness of breath.    Cardiovascular:  Positive for leg swelling. Negative for chest pain.   Gastrointestinal:  Negative for diarrhea, nausea and vomiting.   Musculoskeletal:  Positive for arthralgias and myalgias.        Left posterior knee pain and swelling   Skin:  Positive for color change and wound.   Neurological:  Positive for numbness. Negative for weakness.        + paresthesia        Objective:        Physical Exam  Nursing note reviewed.   Constitutional:       General: She is not in acute distress.     Appearance: She is not toxic-appearing or diaphoretic.   Cardiovascular:      Pulses:           Dorsalis pedis pulses are 1+ on the right side and 1+ on the left side.        Posterior tibial pulses are 2+ on the right side and 2+ on the left side.   Pulmonary:      Effort: No respiratory distress.   Musculoskeletal:      Right lower leg: Edema present.      Left lower leg: Edema " present.      Right ankle: Swelling present. No lateral malleolus, medial malleolus, AITF ligament, CF ligament or posterior TF ligament tenderness. Decreased range of motion.      Right Achilles Tendon: No defects. Paniagua's test negative.      Left ankle: Swelling present. No lateral malleolus, medial malleolus, AITF ligament, CF ligament, posterior TF ligament or proximal fibula tenderness. Decreased range of motion.      Left Achilles Tendon: No defects. Paniagua's test negative.      Right foot: Swelling and tenderness present.      Left foot: Swelling, Charcot foot and tenderness present.      Comments: There is equinus deformity bilateral with decreased dorsiflexion at the ankle joint bilateral    Decreased first MPJ range of motion both weightbearing and nonweightbearing, no crepitus observed the first MP joint, + dorsal flag sign. Mild  bunion deformity is observed .    Patient has hammertoes of digits 2-5 bilateral partially reducible without symptom today.     Skin:     General: Skin is warm and dry.      Coloration: Skin is not pale.      Findings: Erythema and wound (see below) present. No laceration.      Nails: There is no clubbing.   Neurological:      Sensory: No sensory deficit.      Motor: No tremor, atrophy or abnormal muscle tone.      Deep Tendon Reflexes: Reflexes are normal and symmetric.      Comments: Paresthesias, and hyperesthesia bilateral feet at toes with no clearly identified trigger or source.    Knox-Gilberto 5.07 monofilament is intact bilateral feet. Sharp/dull sensation is also intact Bilateral feet.   Psychiatric:         Attention and Perception: She is attentive.         Mood and Affect: Mood is not anxious. Affect is not inappropriate.         Speech: She is communicative. Speech is not slurred.         Behavior: Behavior is not combative.       Vitals:    01/06/23 1102   BP: 131/61   Pulse: 77   Temp: 98.1 °F (36.7 °C)       Assessment:           ICD-10-CM ICD-9-CM   1.  Left midfoot ulcer, with necrosis of muscle  L97.423 707.14     728.89   2. Type II diabetes mellitus with neurological manifestations  E11.49 250.60   3. Charcot's joint of left foot  M14.672 094.0     713.5   4. Pain and swelling of left lower leg  M79.662 729.5    M79.89 729.81   5. Open wound of left foot  S91.302A 892.0            Wound 04/15/22 2230 Diabetic Ulcer Left medial;plantar Foot (Active)   04/15/22 2230    Pre-existing: Yes   Primary Wound Type: Diabetic ulc   Side: Left   Orientation: medial;plantar   Location: Foot   Wound Number:    Ankle-Brachial Index:    Pulses:    Removal Indication and Assessment:    Wound Outcome:    (Retired) Wound Type:    (Retired) Wound Length (cm):    (Retired) Wound Width (cm):    (Retired) Depth (cm):    Wound Description (Comments):    Removal Indications:    Wound Image   01/06/23 1133   Wound WDL ex 01/06/23 1133   Dressing Appearance Moist drainage;Saturated 01/06/23 1133   Drainage Amount Copious 01/06/23 1133   Drainage Characteristics/Odor Serous 01/06/23 1133   Appearance Pink 01/06/23 1133   Tissue loss description Full thickness 01/06/23 1133   Red (%), Wound Tissue Color 100 % 01/06/23 1133   Periwound Area Macerated 01/06/23 1133   Wound Edges Jagged 01/06/23 1133   Wound Length (cm) 5 cm 01/06/23 1133   Wound Width (cm) 6 cm 01/06/23 1133   Wound Depth (cm) 2 cm 01/06/23 1133   Wound Volume (cm^3) 60 cm^3 01/06/23 1133   Wound Surface Area (cm^2) 30 cm^2 01/06/23 1133   Tunneling (depth (cm)/location) 1100 on clock 4cm 01/06/23 1133   Care Cleansed with:;Antimicrobial agent;Sterile normal saline 01/06/23 1133   Dressing Changed 01/06/23 1133   Periwound Care Moisture barrier applied 01/06/23 1133   Dressing Change Due 01/13/23 01/06/23 1133           Plan:                Education about the prevention of limb loss.    Discussed wound healing cycle, skin integrity, ways to care for skin.Counseled patient on the effects of smoking, PVD, and high blood  glucose on healing. She verbalizes understanding that it can increase the chances of delayed healing and this prolonged exposure leads to infection or progression of infection which subsequently can result in loss of limb.    Full workup to rule out OM including ESR, CRP, PCT    The wound is cleansed of foreign material as much as possible and the base inspected for bone or abscess.      Wound Debridement    Date/Time: 1/6/2023 10:30 AM  Performed by: Maira De Los Santos DPM  Authorized by: Maira De Los Santos DPM       Wound Details:    Location:  Left foot    Location:  Left Midfoot    Type of Debridement:  Excisional       Length (cm):  5       Area (sq cm):  30       Width (cm):  6       Percent Debrided (%):  100       Depth (cm):  2       Total Area Debrided (sq cm):  30    Depth of debridement:  Subcutaneous tissue    Tissue debrided:  Dermis, Epidermis and Subcutaneous    Devitalized tissue debrided:  Callus and Fibrin    Instruments:  Curette    Bleeding:  Minimal  Hemostasis Achieved: Yes    Method Used:  Pressure  Patient tolerance:  Patient tolerated the procedure well with no immediate complications      Tissue pathology and/or culture taken     [] Yes      [x] No  Sharp debridement performed                   [x] Yes       [] No  Labs ordered     [] Yes       [x] No  Imaging ordered    [] Yes      [x] No    Orders Placed This Encounter   Procedures    Debridement     This order was created via procedure documentation     Standing Status:   Standing     Number of Occurrences:   1    Sedimentation rate     Standing Status:   Future     Number of Occurrences:   1     Standing Expiration Date:   3/6/2024    CBC Auto Differential     Standing Status:   Future     Number of Occurrences:   1     Standing Expiration Date:   3/6/2024    C-Reactive Protein     Standing Status:   Future     Number of Occurrences:   1     Standing Expiration Date:   3/6/2024    Procalcitonin     Standing Status:   Future     Number of  Occurrences:   1     Standing Expiration Date:   3/6/2024    Change dressing     Remove dsg carefully  Only change external dsg  If internal is loose may replace with adaptic and steristrips  Aquacel Extra Long Mextra  Two long Alex foams in perpendicular fashion with three cast padding football and Renée harmon scooter        Follow up in about 1 week (around 1/13/2023).

## 2023-01-06 NOTE — TELEPHONE ENCOUNTER
No appointments available next Friday will come on Thursday am for Dr visit. Called to notify verbalized understanding.

## 2023-01-09 ENCOUNTER — TELEPHONE (OUTPATIENT)
Dept: SMOKING CESSATION | Facility: CLINIC | Age: 53
End: 2023-01-09
Payer: MEDICAID

## 2023-01-09 ENCOUNTER — HOSPITAL ENCOUNTER (OUTPATIENT)
Dept: CARDIOLOGY | Facility: HOSPITAL | Age: 53
Discharge: HOME | DRG: 638 | End: 2023-01-09
Attending: SURGERY
Payer: MEDICAID

## 2023-01-09 ENCOUNTER — OFFICE VISIT (OUTPATIENT)
Dept: VASCULAR SURGERY | Facility: CLINIC | Age: 53
DRG: 638 | End: 2023-01-09
Payer: MEDICAID

## 2023-01-09 ENCOUNTER — HOSPITAL ENCOUNTER (INPATIENT)
Facility: HOSPITAL | Age: 53
LOS: 8 days | Discharge: NURSING, REHAB OR LONG-TERM CARE | DRG: 638 | End: 2023-01-17
Attending: EMERGENCY MEDICINE | Admitting: EMERGENCY MEDICINE
Payer: MEDICAID

## 2023-01-09 ENCOUNTER — TELEPHONE (OUTPATIENT)
Dept: PODIATRY | Facility: CLINIC | Age: 53
End: 2023-01-09
Payer: MEDICAID

## 2023-01-09 VITALS
BODY MASS INDEX: 39.71 KG/M2 | DIASTOLIC BLOOD PRESSURE: 72 MMHG | SYSTOLIC BLOOD PRESSURE: 146 MMHG | WEIGHT: 246.06 LBS

## 2023-01-09 DIAGNOSIS — I89.0 LYMPHEDEMA: ICD-10-CM

## 2023-01-09 DIAGNOSIS — R60.0 LEG EDEMA, LEFT: ICD-10-CM

## 2023-01-09 DIAGNOSIS — Z98.890 STATUS POST FOOT SURGERY: Primary | ICD-10-CM

## 2023-01-09 DIAGNOSIS — E11.621 DIABETIC ULCER OF LEFT MIDFOOT ASSOCIATED WITH TYPE 2 DIABETES MELLITUS, LIMITED TO BREAKDOWN OF SKIN: Primary | ICD-10-CM

## 2023-01-09 DIAGNOSIS — L97.421 DIABETIC ULCER OF LEFT MIDFOOT ASSOCIATED WITH TYPE 2 DIABETES MELLITUS, LIMITED TO BREAKDOWN OF SKIN: Primary | ICD-10-CM

## 2023-01-09 DIAGNOSIS — L03.116 CELLULITIS OF LEFT LOWER EXTREMITY: ICD-10-CM

## 2023-01-09 DIAGNOSIS — M86.179 OTHER ACUTE OSTEOMYELITIS, UNSPECIFIED ANKLE AND FOOT: ICD-10-CM

## 2023-01-09 DIAGNOSIS — I87.2 VENOUS INSUFFICIENCY: ICD-10-CM

## 2023-01-09 DIAGNOSIS — Z98.890 STATUS POST ABLATION OF INCOMPETENT VEIN USING LASER: Primary | ICD-10-CM

## 2023-01-09 DIAGNOSIS — M79.89 LEG SWELLING: ICD-10-CM

## 2023-01-09 DIAGNOSIS — R07.9 CHEST PAIN: ICD-10-CM

## 2023-01-09 DIAGNOSIS — M79.673 FOOT PAIN: ICD-10-CM

## 2023-01-09 PROBLEM — R09.81 NASAL CONGESTION: Status: RESOLVED | Noted: 2022-02-10 | Resolved: 2023-01-09

## 2023-01-09 PROBLEM — K59.00 CONSTIPATION: Status: RESOLVED | Noted: 2022-05-08 | Resolved: 2023-01-09

## 2023-01-09 PROBLEM — E87.5 HYPERKALEMIA: Status: RESOLVED | Noted: 2022-04-21 | Resolved: 2023-01-09

## 2023-01-09 LAB
ALBUMIN SERPL BCP-MCNC: 3.1 G/DL (ref 3.5–5.2)
ALP SERPL-CCNC: 75 U/L (ref 55–135)
ALT SERPL W/O P-5'-P-CCNC: 6 U/L (ref 10–44)
ANION GAP SERPL CALC-SCNC: 10 MMOL/L (ref 8–16)
APTT BLDCRRT: 32.5 SEC (ref 21–32)
AST SERPL-CCNC: 14 U/L (ref 10–40)
BASOPHILS # BLD AUTO: 0.13 K/UL (ref 0–0.2)
BASOPHILS NFR BLD: 0.9 % (ref 0–1.9)
BILIRUB SERPL-MCNC: 0.2 MG/DL (ref 0.1–1)
BUN SERPL-MCNC: 14 MG/DL (ref 6–20)
CALCIUM SERPL-MCNC: 9.6 MG/DL (ref 8.7–10.5)
CHLORIDE SERPL-SCNC: 94 MMOL/L (ref 95–110)
CO2 SERPL-SCNC: 28 MMOL/L (ref 23–29)
CREAT SERPL-MCNC: 0.7 MG/DL (ref 0.5–1.4)
CRP SERPL-MCNC: 38.3 MG/L (ref 0–8.2)
DIFFERENTIAL METHOD: ABNORMAL
EOSINOPHIL # BLD AUTO: 0.5 K/UL (ref 0–0.5)
EOSINOPHIL NFR BLD: 3.4 % (ref 0–8)
ERYTHROCYTE [DISTWIDTH] IN BLOOD BY AUTOMATED COUNT: 16.8 % (ref 11.5–14.5)
ERYTHROCYTE [SEDIMENTATION RATE] IN BLOOD BY WESTERGREN METHOD: 30 MM/HR (ref 0–20)
EST. GFR  (NO RACE VARIABLE): >60 ML/MIN/1.73 M^2
GLUCOSE SERPL-MCNC: 106 MG/DL (ref 70–110)
HCT VFR BLD AUTO: 28.9 % (ref 37–48.5)
HGB BLD-MCNC: 9.2 G/DL (ref 12–16)
IMM GRANULOCYTES # BLD AUTO: 0.09 K/UL (ref 0–0.04)
IMM GRANULOCYTES NFR BLD AUTO: 0.6 % (ref 0–0.5)
INR PPP: 1 (ref 0.8–1.2)
LACTATE SERPL-SCNC: 1.8 MMOL/L (ref 0.5–2.2)
LEFT ANT TIBIAL SYS PSV: 136 CM/S
LEFT CFA PSV: 242 CM/S
LEFT EXTERNAL ILIAC PSV: 246 CM/S
LEFT PERONEAL SYS PSV: 44 CM/S
LEFT POPLITEAL PSV: 203 CM/S
LEFT POST TIBIAL SYS PSV: 71 CM/S
LEFT PROFUNDA SYS PSV: 130 CM/S
LEFT SUPER FEMORAL DIST SYS PSV: 223 CM/S
LEFT SUPER FEMORAL MID SYS PSV: 210 CM/S
LEFT SUPER FEMORAL OSTIAL SYS PSV: 203 CM/S
LEFT SUPER FEMORAL PROX SYS PSV: 192 CM/S
LEFT TIB/PER TRUNK SYS PSV: 264 CM/S
LYMPHOCYTES # BLD AUTO: 5.1 K/UL (ref 1–4.8)
LYMPHOCYTES NFR BLD: 34.7 % (ref 18–48)
MCH RBC QN AUTO: 24.5 PG (ref 27–31)
MCHC RBC AUTO-ENTMCNC: 31.8 G/DL (ref 32–36)
MCV RBC AUTO: 77 FL (ref 82–98)
MONOCYTES # BLD AUTO: 1.8 K/UL (ref 0.3–1)
MONOCYTES NFR BLD: 12.2 % (ref 4–15)
NEUTROPHILS # BLD AUTO: 7 K/UL (ref 1.8–7.7)
NEUTROPHILS NFR BLD: 48.2 % (ref 38–73)
NRBC BLD-RTO: 0 /100 WBC
OHS CV LEFT LOWER EXTREMITY ABI (NO CALC): 0.95
PLATELET # BLD AUTO: 872 K/UL (ref 150–450)
PMV BLD AUTO: 9.2 FL (ref 9.2–12.9)
POCT GLUCOSE: 106 MG/DL (ref 70–110)
POCT GLUCOSE: 144 MG/DL (ref 70–110)
POCT GLUCOSE: 190 MG/DL (ref 70–110)
POTASSIUM SERPL-SCNC: 4.7 MMOL/L (ref 3.5–5.1)
PROCALCITONIN SERPL IA-MCNC: 0.02 NG/ML
PROT SERPL-MCNC: 7.3 G/DL (ref 6–8.4)
PROTHROMBIN TIME: 10.6 SEC (ref 9–12.5)
RBC # BLD AUTO: 3.75 M/UL (ref 4–5.4)
SODIUM SERPL-SCNC: 132 MMOL/L (ref 136–145)
WBC # BLD AUTO: 14.57 K/UL (ref 3.9–12.7)

## 2023-01-09 PROCEDURE — 93926 LOWER EXTREMITY STUDY: CPT | Mod: LT

## 2023-01-09 PROCEDURE — 63600175 PHARM REV CODE 636 W HCPCS: Performed by: EMERGENCY MEDICINE

## 2023-01-09 PROCEDURE — 1159F PR MEDICATION LIST DOCUMENTED IN MEDICAL RECORD: ICD-10-PCS | Mod: CPTII,,, | Performed by: SURGERY

## 2023-01-09 PROCEDURE — 3078F DIAST BP <80 MM HG: CPT | Mod: CPTII,,, | Performed by: SURGERY

## 2023-01-09 PROCEDURE — 87186 SC STD MICRODIL/AGAR DIL: CPT | Mod: 59 | Performed by: PODIATRIST

## 2023-01-09 PROCEDURE — 3008F PR BODY MASS INDEX (BMI) DOCUMENTED: ICD-10-PCS | Mod: CPTII,,, | Performed by: SURGERY

## 2023-01-09 PROCEDURE — 96374 THER/PROPH/DIAG INJ IV PUSH: CPT

## 2023-01-09 PROCEDURE — 63600175 PHARM REV CODE 636 W HCPCS: Performed by: HOSPITALIST

## 2023-01-09 PROCEDURE — 99214 OFFICE O/P EST MOD 30 MIN: CPT | Mod: S$PBB,,, | Performed by: SURGERY

## 2023-01-09 PROCEDURE — 93926 LOWER EXTREMITY STUDY: CPT | Mod: 26,LT,, | Performed by: SURGERY

## 2023-01-09 PROCEDURE — 25000003 PHARM REV CODE 250: Performed by: EMERGENCY MEDICINE

## 2023-01-09 PROCEDURE — 3008F BODY MASS INDEX DOCD: CPT | Mod: CPTII,,, | Performed by: SURGERY

## 2023-01-09 PROCEDURE — 25000003 PHARM REV CODE 250: Performed by: HOSPITALIST

## 2023-01-09 PROCEDURE — 3077F PR MOST RECENT SYSTOLIC BLOOD PRESSURE >= 140 MM HG: ICD-10-PCS | Mod: CPTII,,, | Performed by: SURGERY

## 2023-01-09 PROCEDURE — 87070 CULTURE OTHR SPECIMN AEROBIC: CPT | Performed by: PODIATRIST

## 2023-01-09 PROCEDURE — 99999 PR PBB SHADOW E&M-EST. PATIENT-LVL III: CPT | Mod: PBBFAC,,, | Performed by: SURGERY

## 2023-01-09 PROCEDURE — 3077F SYST BP >= 140 MM HG: CPT | Mod: CPTII,,, | Performed by: SURGERY

## 2023-01-09 PROCEDURE — 3078F PR MOST RECENT DIASTOLIC BLOOD PRESSURE < 80 MM HG: ICD-10-PCS | Mod: CPTII,,, | Performed by: SURGERY

## 2023-01-09 PROCEDURE — 85730 THROMBOPLASTIN TIME PARTIAL: CPT | Performed by: EMERGENCY MEDICINE

## 2023-01-09 PROCEDURE — 86140 C-REACTIVE PROTEIN: CPT | Performed by: EMERGENCY MEDICINE

## 2023-01-09 PROCEDURE — 96375 TX/PRO/DX INJ NEW DRUG ADDON: CPT

## 2023-01-09 PROCEDURE — 99285 EMERGENCY DEPT VISIT HI MDM: CPT | Mod: 25,27

## 2023-01-09 PROCEDURE — 87205 SMEAR GRAM STAIN: CPT | Performed by: PODIATRIST

## 2023-01-09 PROCEDURE — 11000001 HC ACUTE MED/SURG PRIVATE ROOM

## 2023-01-09 PROCEDURE — 80053 COMPREHEN METABOLIC PANEL: CPT | Performed by: EMERGENCY MEDICINE

## 2023-01-09 PROCEDURE — 82962 GLUCOSE BLOOD TEST: CPT | Mod: 59

## 2023-01-09 PROCEDURE — 1159F MED LIST DOCD IN RCRD: CPT | Mod: CPTII,,, | Performed by: SURGERY

## 2023-01-09 PROCEDURE — 99213 OFFICE O/P EST LOW 20 MIN: CPT | Mod: PBBFAC,25 | Performed by: SURGERY

## 2023-01-09 PROCEDURE — 83605 ASSAY OF LACTIC ACID: CPT | Performed by: EMERGENCY MEDICINE

## 2023-01-09 PROCEDURE — 87077 CULTURE AEROBIC IDENTIFY: CPT | Mod: 59 | Performed by: PODIATRIST

## 2023-01-09 PROCEDURE — 1111F DSCHRG MED/CURRENT MED MERGE: CPT | Mod: CPTII,,, | Performed by: SURGERY

## 2023-01-09 PROCEDURE — 99214 PR OFFICE/OUTPT VISIT, EST, LEVL IV, 30-39 MIN: ICD-10-PCS | Mod: S$PBB,,, | Performed by: SURGERY

## 2023-01-09 PROCEDURE — 87040 BLOOD CULTURE FOR BACTERIA: CPT | Performed by: EMERGENCY MEDICINE

## 2023-01-09 PROCEDURE — 99999 PR PBB SHADOW E&M-EST. PATIENT-LVL III: ICD-10-PCS | Mod: PBBFAC,,, | Performed by: SURGERY

## 2023-01-09 PROCEDURE — 84145 PROCALCITONIN (PCT): CPT | Performed by: EMERGENCY MEDICINE

## 2023-01-09 PROCEDURE — 93926 CV US DOPPLER ARTERIAL LEG LEFT (CUPID ONLY): ICD-10-PCS | Mod: 26,LT,, | Performed by: SURGERY

## 2023-01-09 PROCEDURE — 85025 COMPLETE CBC W/AUTO DIFF WBC: CPT | Performed by: EMERGENCY MEDICINE

## 2023-01-09 PROCEDURE — 85610 PROTHROMBIN TIME: CPT | Performed by: EMERGENCY MEDICINE

## 2023-01-09 PROCEDURE — 1111F PR DISCHARGE MEDS RECONCILED W/ CURRENT OUTPATIENT MED LIST: ICD-10-PCS | Mod: CPTII,,, | Performed by: SURGERY

## 2023-01-09 PROCEDURE — 85652 RBC SED RATE AUTOMATED: CPT | Performed by: EMERGENCY MEDICINE

## 2023-01-09 PROCEDURE — A4216 STERILE WATER/SALINE, 10 ML: HCPCS | Performed by: HOSPITALIST

## 2023-01-09 RX ORDER — GLUCAGON 1 MG
1 KIT INJECTION
Status: DISCONTINUED | OUTPATIENT
Start: 2023-01-09 | End: 2023-01-17 | Stop reason: HOSPADM

## 2023-01-09 RX ORDER — GABAPENTIN 300 MG/1
300 CAPSULE ORAL
Status: COMPLETED | OUTPATIENT
Start: 2023-01-09 | End: 2023-01-09

## 2023-01-09 RX ORDER — IBUPROFEN 200 MG
16 TABLET ORAL
Status: DISCONTINUED | OUTPATIENT
Start: 2023-01-09 | End: 2023-01-17 | Stop reason: HOSPADM

## 2023-01-09 RX ORDER — NALOXONE HCL 0.4 MG/ML
0.02 VIAL (ML) INJECTION
Status: DISCONTINUED | OUTPATIENT
Start: 2023-01-09 | End: 2023-01-17 | Stop reason: HOSPADM

## 2023-01-09 RX ORDER — AMOXICILLIN 250 MG
1 CAPSULE ORAL 2 TIMES DAILY
Status: DISCONTINUED | OUTPATIENT
Start: 2023-01-09 | End: 2023-01-17 | Stop reason: HOSPADM

## 2023-01-09 RX ORDER — LANOLIN ALCOHOL/MO/W.PET/CERES
1 CREAM (GRAM) TOPICAL DAILY
Status: DISCONTINUED | OUTPATIENT
Start: 2023-01-10 | End: 2023-01-17 | Stop reason: HOSPADM

## 2023-01-09 RX ORDER — INSULIN ASPART 100 [IU]/ML
3 INJECTION, SOLUTION INTRAVENOUS; SUBCUTANEOUS
Status: DISCONTINUED | OUTPATIENT
Start: 2023-01-09 | End: 2023-01-15

## 2023-01-09 RX ORDER — IBUPROFEN 200 MG
24 TABLET ORAL
Status: DISCONTINUED | OUTPATIENT
Start: 2023-01-09 | End: 2023-01-17 | Stop reason: HOSPADM

## 2023-01-09 RX ORDER — BUMETANIDE 1 MG/1
1 TABLET ORAL DAILY
Status: DISCONTINUED | OUTPATIENT
Start: 2023-01-10 | End: 2023-01-17 | Stop reason: HOSPADM

## 2023-01-09 RX ORDER — RISPERIDONE 1 MG/1
3 TABLET ORAL 2 TIMES DAILY
Status: DISCONTINUED | OUTPATIENT
Start: 2023-01-09 | End: 2023-01-17 | Stop reason: HOSPADM

## 2023-01-09 RX ORDER — SODIUM CHLORIDE 0.9 % (FLUSH) 0.9 %
10 SYRINGE (ML) INJECTION EVERY 8 HOURS
Status: DISCONTINUED | OUTPATIENT
Start: 2023-01-09 | End: 2023-01-12

## 2023-01-09 RX ORDER — IPRATROPIUM BROMIDE AND ALBUTEROL SULFATE 2.5; .5 MG/3ML; MG/3ML
3 SOLUTION RESPIRATORY (INHALATION) EVERY 6 HOURS PRN
Status: DISCONTINUED | OUTPATIENT
Start: 2023-01-09 | End: 2023-01-09

## 2023-01-09 RX ORDER — CEFEPIME HYDROCHLORIDE 1 G/50ML
2 INJECTION, SOLUTION INTRAVENOUS
Status: DISCONTINUED | OUTPATIENT
Start: 2023-01-09 | End: 2023-01-13

## 2023-01-09 RX ORDER — CEFTRIAXONE 2 G/50ML
2 INJECTION, SOLUTION INTRAVENOUS
Status: COMPLETED | OUTPATIENT
Start: 2023-01-09 | End: 2023-01-09

## 2023-01-09 RX ORDER — OXYCODONE AND ACETAMINOPHEN 10; 325 MG/1; MG/1
1 TABLET ORAL EVERY 6 HOURS PRN
Status: DISCONTINUED | OUTPATIENT
Start: 2023-01-09 | End: 2023-01-17 | Stop reason: HOSPADM

## 2023-01-09 RX ORDER — TRAMADOL HYDROCHLORIDE 50 MG/1
50 TABLET ORAL EVERY 8 HOURS PRN
Status: DISCONTINUED | OUTPATIENT
Start: 2023-01-09 | End: 2023-01-17 | Stop reason: HOSPADM

## 2023-01-09 RX ORDER — ONDANSETRON 2 MG/ML
4 INJECTION INTRAMUSCULAR; INTRAVENOUS EVERY 6 HOURS PRN
Status: DISCONTINUED | OUTPATIENT
Start: 2023-01-09 | End: 2023-01-17 | Stop reason: HOSPADM

## 2023-01-09 RX ORDER — ACETAMINOPHEN 325 MG/1
650 TABLET ORAL EVERY 4 HOURS PRN
Status: DISCONTINUED | OUTPATIENT
Start: 2023-01-09 | End: 2023-01-17 | Stop reason: HOSPADM

## 2023-01-09 RX ORDER — PRAVASTATIN SODIUM 40 MG/1
40 TABLET ORAL NIGHTLY
Status: DISCONTINUED | OUTPATIENT
Start: 2023-01-09 | End: 2023-01-17 | Stop reason: HOSPADM

## 2023-01-09 RX ORDER — ACETAMINOPHEN 325 MG/1
650 TABLET ORAL
Status: COMPLETED | OUTPATIENT
Start: 2023-01-09 | End: 2023-01-09

## 2023-01-09 RX ORDER — GABAPENTIN 300 MG/1
600 CAPSULE ORAL 2 TIMES DAILY
Status: DISCONTINUED | OUTPATIENT
Start: 2023-01-09 | End: 2023-01-17 | Stop reason: HOSPADM

## 2023-01-09 RX ORDER — METHOCARBAMOL 500 MG/1
500 TABLET, FILM COATED ORAL 2 TIMES DAILY PRN
Status: DISCONTINUED | OUTPATIENT
Start: 2023-01-09 | End: 2023-01-17 | Stop reason: HOSPADM

## 2023-01-09 RX ORDER — CYCLOBENZAPRINE HCL 10 MG
10 TABLET ORAL
Status: COMPLETED | OUTPATIENT
Start: 2023-01-09 | End: 2023-01-09

## 2023-01-09 RX ORDER — KETOROLAC TROMETHAMINE 30 MG/ML
15 INJECTION, SOLUTION INTRAMUSCULAR; INTRAVENOUS
Status: COMPLETED | OUTPATIENT
Start: 2023-01-09 | End: 2023-01-09

## 2023-01-09 RX ORDER — DIVALPROEX SODIUM 250 MG/1
1500 TABLET, DELAYED RELEASE ORAL NIGHTLY
Status: DISCONTINUED | OUTPATIENT
Start: 2023-01-09 | End: 2023-01-17 | Stop reason: HOSPADM

## 2023-01-09 RX ORDER — PROCHLORPERAZINE EDISYLATE 5 MG/ML
5 INJECTION INTRAMUSCULAR; INTRAVENOUS EVERY 6 HOURS PRN
Status: DISCONTINUED | OUTPATIENT
Start: 2023-01-09 | End: 2023-01-17 | Stop reason: HOSPADM

## 2023-01-09 RX ORDER — INSULIN ASPART 100 [IU]/ML
0-5 INJECTION, SOLUTION INTRAVENOUS; SUBCUTANEOUS
Status: DISCONTINUED | OUTPATIENT
Start: 2023-01-09 | End: 2023-01-17 | Stop reason: HOSPADM

## 2023-01-09 RX ORDER — TALC
6 POWDER (GRAM) TOPICAL NIGHTLY PRN
Status: DISCONTINUED | OUTPATIENT
Start: 2023-01-09 | End: 2023-01-17 | Stop reason: HOSPADM

## 2023-01-09 RX ADMIN — GABAPENTIN 600 MG: 300 CAPSULE ORAL at 09:01

## 2023-01-09 RX ADMIN — VANCOMYCIN HYDROCHLORIDE 1500 MG: 1.5 INJECTION, POWDER, LYOPHILIZED, FOR SOLUTION INTRAVENOUS at 01:01

## 2023-01-09 RX ADMIN — SENNOSIDES AND DOCUSATE SODIUM 1 TABLET: 50; 8.6 TABLET ORAL at 09:01

## 2023-01-09 RX ADMIN — ACETAMINOPHEN 650 MG: 325 TABLET ORAL at 12:01

## 2023-01-09 RX ADMIN — ACETAMINOPHEN 650 MG: 325 TABLET ORAL at 05:01

## 2023-01-09 RX ADMIN — GABAPENTIN 300 MG: 300 CAPSULE ORAL at 12:01

## 2023-01-09 RX ADMIN — CEFTRIAXONE 2 G: 2 INJECTION, SOLUTION INTRAVENOUS at 03:01

## 2023-01-09 RX ADMIN — CYCLOBENZAPRINE 10 MG: 10 TABLET, FILM COATED ORAL at 12:01

## 2023-01-09 RX ADMIN — PRAVASTATIN SODIUM 40 MG: 40 TABLET ORAL at 09:01

## 2023-01-09 RX ADMIN — RISPERIDONE 3 MG: 1 TABLET ORAL at 09:01

## 2023-01-09 RX ADMIN — DIVALPROEX SODIUM 1500 MG: 250 TABLET, DELAYED RELEASE ORAL at 09:01

## 2023-01-09 RX ADMIN — KETOROLAC TROMETHAMINE 15 MG: 30 INJECTION, SOLUTION INTRAMUSCULAR; INTRAVENOUS at 12:01

## 2023-01-09 RX ADMIN — CEFEPIME HYDROCHLORIDE 2 G: 2 INJECTION, SOLUTION INTRAVENOUS at 10:01

## 2023-01-09 RX ADMIN — TRAMADOL HYDROCHLORIDE 50 MG: 50 TABLET, COATED ORAL at 09:01

## 2023-01-09 RX ADMIN — Medication 10 ML: at 09:01

## 2023-01-09 RX ADMIN — INSULIN DETEMIR 10 UNITS: 100 INJECTION, SOLUTION SUBCUTANEOUS at 09:01

## 2023-01-09 RX ADMIN — APIXABAN 5 MG: 5 TABLET, FILM COATED ORAL at 09:01

## 2023-01-09 RX ADMIN — OXYCODONE AND ACETAMINOPHEN 1 TABLET: 10; 325 TABLET ORAL at 06:01

## 2023-01-09 NOTE — ASSESSMENT & PLAN NOTE
Admitted with worsening L plantar DM foot wound. S/p I&D in 12/2022 with bone cultures no growth and treated for skin/soft tissue infection with bactrim x10 days. Worsening with enlarging wound and cellulitis involving the foot and the leg.   - On vanc/ceftriaxone  - Podiatry consulted     Patient's FSGs are uncontrolled due to hyperglycemia on current medication regimen.  Last A1c reviewed-   Lab Results   Component Value Date    HGBA1C 7.1 (H) 12/22/2022     Most recent fingerstick glucose reviewed-   Recent Labs   Lab 01/09/23  1013   POCTGLUCOSE 190*     Current correctional scale  Low  Maintain anti-hyperglycemic dose as follows-   Antihyperglycemics (From admission, onward)    Start     Stop Route Frequency Ordered    01/09/23 2100  insulin detemir U-100 pen 10 Units         -- SubQ Nightly 01/09/23 1620    01/09/23 1730  insulin aspart U-100 pen 3 Units         -- SubQ 3 times daily with meals 01/09/23 1620    01/09/23 1719  insulin aspart U-100 pen 0-5 Units         -- SubQ Before meals & nightly PRN 01/09/23 1620        Hold Oral hypoglycemics while patient is in the hospital.

## 2023-01-09 NOTE — ASSESSMENT & PLAN NOTE
No signs of acute bibi or depression. She is NOT taking carbamazepine.   - continue risperdal- takes 3mg BID  - continue depakote- takes 1500mg QHS

## 2023-01-09 NOTE — SUBJECTIVE & OBJECTIVE
"Past Medical History:   Diagnosis Date    ADHD (attention deficit hyperactivity disorder)     Arthritis     Asthma     Bipolar 1 disorder     Cataract     Cigarette smoker     COPD (chronic obstructive pulmonary disease)     Coronary artery disease     A fib    Depression     bipolar manic depresson    Diabetes mellitus     Diabetic foot ulcers     Diabetic neuropathy     DVT of lower extremity, bilateral 07/2013    bilateral LE DVT. [facility] filter placed.     Encounter for blood transfusion     History of blood clots 1. Left Leg=2003; 2.Bilateral Groin=Blood Clots= 5 or 6/ 2013 & 7/2013; 3. LLL of Lung=7/2013;  4. Lt. Lower Leg=7/2013.     Pt. had 1st Blood Clot after Lvytqfbmkaom=3192, & Last=2013. [facility] Filter= Rt.Lateral Neck.    HTN (hypertension) 06/06/2013    Pt states that she does not have hypertension    Hypercholesteremia     Irregular heartbeat     Neuromuscular disorder     neuropathy feet    Obese     PE (pulmonary embolism) 07/2013    bilat LE DVT.     Restless leg syndrome        Past Surgical History:   Procedure Laterality Date    ABDOMINAL SURGERY  2010    gastric sleeve    BILATERAL OOPHORECTOMY Bilateral 1/12/2015    CHOLECYSTECTOMY      DEBRIDEMENT OF FOOT Bilateral 5/10/2022    Procedure: DEBRIDEMENT, FOOT;  Surgeon: Maira De Los Santos DPM;  Location: [facility] OR;  Service: Podiatry;  Laterality: Bilateral;    Green' s filter Right 7/4/2012    Right Neck & Tunneled Down.    HERNIA REPAIR      "[facility] of Hernias Repaires around th [place].", pt. states    INCISION AND DRAINAGE FOOT Left 12/24/2022    Procedure: INCISION AND DRAINAGE, FOOT;  Surgeon: Fahad Razo DPM;  Location: [facility] OR;  Service: Podiatry;  Laterality: Left;    LAPAROSCOPIC CHOLECYSTECTOMY N/A 9/10/2020    Procedure: CHOLECYSTECTOMY, LAPAROSCOPIC;  Surgeon: Montrell Gutierrez MD;  Location: [facility] OR;  Service: General;  Laterality: N/A;  RN PREOP 9/9----COVID Negative  9/9    OVARIAN CYST REMOVAL  3/13/2014    NH REMOVAL OF " OVARY/TUBE(S)      SPLENECTOMY, TOTAL  July 2003    TONSILLECTOMY      as a child    TYMPANOSTOMY TUBE PLACEMENT  1976    VEIN SURGERY  2003    Lt leg       Review of patient's allergies indicates:   Allergen Reactions    Morphine Other (See Comments)     Patient had a psychotic episode after taking Morphine  Agitation, hallucinations    Penicillins Anaphylaxis     Tolerated cephalosporins in the past    Januvia [sitagliptin] Hives    Carbamazepine Other (See Comments)     hyponatremia       No current facility-administered medications on file prior to encounter.     Current Outpatient Medications on File Prior to Encounter   Medication Sig    acetaminophen (TYLENOL) 500 MG tablet Take 2 tablets (1,000 mg total) by mouth every 6 (six) hours as needed for Pain.    DUPIXENT  mg/2 mL PnIj Inject into the skin.    gabapentin (NEURONTIN) 300 MG capsule TAKE 2 CAPSULES(600 MG) BY MOUTH TWICE DAILY    lisinopriL 10 MG tablet Take 1 tablet (10 mg total) by mouth once daily.    loratadine (CLARITIN) 10 mg tablet Take 1 tablet (10 mg total) by mouth once daily.    metFORMIN (GLUCOPHAGE) 1000 MG tablet Take 1 tablet (1,000 mg total) by mouth 2 (two) times daily with meals.    methocarbamoL (ROBAXIN) 500 MG Tab Take 500 mg by mouth. Frequency could not be confirmed.    metoprolol tartrate (LOPRESSOR) 25 MG tablet Take 1 tablet (25 mg total) by mouth 2 (two) times daily.    multivitamin Tab Take 1 tablet by mouth once daily.    pantoprazole (PROTONIX) 40 MG tablet Take 1 tablet (40 mg total) by mouth once daily.    potassium chloride (MICRO-K) 10 MEQ CpSR Take 1 capsule (10 mEq total) by mouth once daily.    pravastatin (PRAVACHOL) 40 MG tablet TAKE 1 TABLET(40 MG) BY MOUTH EVERY EVENING    risperiDONE (RISPERDAL M-TABS) 3 MG disintegrating tablet Take 1 tablet (3 mg total) by mouth 2 (two) times daily.    semaglutide (OZEMPIC) 1 mg/dose (4 mg/3 mL) Inject 1 mg into the skin every 7 days.    traMADoL (ULTRAM) 50 mg tablet  Take 1 tablet (50 mg total) by mouth every 8 (eight) hours as needed for Pain.    triamcinolone acetonide 0.025% (KENALOG) 0.025 % cream Apply topically 2 (two) times daily.    VYVANSE 40 mg Cap Take 40 mg by mouth once daily.    albuterol (PROVENTIL/VENTOLIN HFA) 90 mcg/actuation inhaler INHALE 2 PUFFS INTO THE LUNGS EVERY 6 HOURS AS NEEDED FOR WHEEZING. RESCUE    albuterol-ipratropium (DUO-NEB) 2.5 mg-0.5 mg/3 mL nebulizer solution Take 3 mLs by nebulization every 6 (six) hours as needed for Wheezing or Shortness of Breath. Rescue    ammonium lactate (LAC-HYDRIN) 12 % lotion APPL Y ONCE TOPICALLY TWICE DAILY FOR 30 DAYS    apixaban (ELIQUIS) 5 mg Tab Take 1 tablet (5 mg total) by mouth 2 (two) times daily.    aspirin 81 MG Chew Take 1 tablet (81 mg total) by mouth once daily.    BINAXNOW COVID-19 AG SELF TEST Kit TEST AS DIRECTED TODAY    bumetanide (BUMEX) 1 MG tablet Take 1 tablet (1 mg total) by mouth once daily.    carBAMazepine (TEGRETOL) 200 mg tablet Take 400 mg by mouth 2 (two) times daily.    divalproex (DEPAKOTE) 250 MG EC tablet Take 5 tablets (1,250 mg total) by mouth every evening.    divalproex (DEPAKOTE) 500 MG TbEC Take 1 tablet (500 mg total) by mouth once daily. PO QAM    fluticasone propionate (FLONASE) 50 mcg/actuation nasal spray 2 sprays (100 mcg total) by Each Nostril route daily as needed (Nasal congestion).    fluticasone-salmeterol diskus inhaler 250-50 mcg Inhale 1 puff into the lungs 2 (two) times daily. Controller    hydrOXYzine (ATARAX) 50 MG tablet Take 50 mg by mouth 4 (four) times daily as needed.    nystatin (NYSTOP) powder APPLY TO ABDOMINAL AND BREAST SKIN FOLD TWICE DAILY.    [DISCONTINUED] diclofenac sodium (VOLTAREN) 1 % Gel Apply 2 g topically 4 (four) times daily as needed (Apply to painful area up to 4 times a day as needed for pain). Apply to painful area 4 times a day as needed for pain    [DISCONTINUED] furosemide (LASIX) 20 MG tablet TAKE 1 TABLET(20 MG) BY MOUTH EVERY  DAY    [DISCONTINUED] QUEtiapine (SEROQUEL) 200 MG Tab Take 1 tablet (200 mg total) by mouth before breakfast.     Family History       Problem Relation (Age of Onset)    Cataracts Father    Diabetes Father, Paternal Grandfather    Heart disease Father, Paternal Grandfather    Hypertension Father    No Known Problems Mother, Sister, Brother, Maternal Aunt, Maternal Uncle, Paternal Aunt, Paternal Uncle, Maternal Grandfather    Ovarian cancer Maternal Grandmother, Paternal Grandmother          Tobacco Use    Smoking status: Every Day     Packs/day: 1.00     Years: 37.00     Pack years: 37.00     Types: Cigarettes     Last attempt to quit: 2020     Years since quittin.0    Smokeless tobacco: Never    Tobacco comments:     Enrolled in the [employer] on 5/3/14 ([facility] Member ID # [id number]). Ambulatory referral to Smoking Cessation Program   Substance and Sexual Activity    Alcohol use: No     Alcohol/week: 0.0 standard drinks    Drug use: No    Sexual activity: Yes     Partners: Male     Review of Systems   Constitutional:  Positive for activity change, appetite change and fever. Negative for chills.   HENT:  Positive for congestion. Negative for sinus pressure, sinus pain and trouble swallowing.    Respiratory:  Negative for cough, chest tightness and shortness of breath.    Cardiovascular:  Positive for leg swelling. Negative for chest pain and palpitations.   Gastrointestinal:  Negative for abdominal pain, constipation, diarrhea, nausea and vomiting.   Endocrine: Positive for polyuria.   Genitourinary:  Negative for difficulty urinating.   Musculoskeletal:  Negative for arthralgias and myalgias.   Skin:  Positive for color change, rash and wound.   Neurological:  Positive for numbness. Negative for weakness and headaches.   Hematological:  Negative for adenopathy.   Psychiatric/Behavioral:  Negative for confusion.    Objective:     Vital Signs (Most Recent):  Temp: 98.6 °F (37 °C) (23 1011)  Pulse:  69 (01/09/23 1430)  Resp: 17 (01/09/23 1430)  BP: (!) 108/53 (01/09/23 1433)  SpO2: 95 % (01/09/23 1430)   Vital Signs (24h Range):  Temp:  [98.6 °F (37 °C)] 98.6 °F (37 °C)  Pulse:  [63-71] 69  Resp:  [17-18] 17  SpO2:  [95 %-98 %] 95 %  BP: (108-150)/(53-72) 108/53     Weight: 111.6 kg (246 lb)  Body mass index is 39.71 kg/m².    Physical Exam  Vitals and nursing note reviewed.   Constitutional:       General: She is not in acute distress.     Appearance: She is obese. She is not ill-appearing, toxic-appearing or diaphoretic.   HENT:      Head: Normocephalic and atraumatic.      Nose: Nose normal.      Mouth/Throat:      Mouth: Mucous membranes are moist.   Eyes:      General: No scleral icterus.     Conjunctiva/sclera: Conjunctivae normal.   Cardiovascular:      Rate and Rhythm: Normal rate and regular rhythm.      Pulses: Normal pulses.      Heart sounds: Normal heart sounds. No murmur heard.    No gallop.   Pulmonary:      Effort: Pulmonary effort is normal. No respiratory distress.      Breath sounds: Normal breath sounds. No wheezing or rales.      Comments: Room air  Abdominal:      General: Bowel sounds are normal. There is no distension.      Palpations: Abdomen is soft. There is no mass.      Tenderness: There is no abdominal tenderness. There is no guarding.   Musculoskeletal:      Right lower leg: No edema.      Left lower leg: No edema.      Comments: L foot bandaged   Skin:     General: Skin is warm and dry.      Findings: Erythema (left lower leg) and rash present.   Neurological:      Mental Status: She is alert and oriented to person, place, and time.      Sensory: Sensory deficit (bilateral feet) present.      Motor: No weakness.         Significant Labs: All pertinent labs within the past 24 hours have been reviewed.    Significant Imaging: I have reviewed all pertinent imaging results/findings within the past 24 hours.

## 2023-01-09 NOTE — HPI
Ms Audrey Natarajan is a 52 y.o. woman with DM who presents with worsening L leg swelling.     She was recently admitted 12/22-27/2022 with worsening L plantar foot ulceration. S/p debridement by Podiatry. Bone cultures no growth. Wound culture with MRSA. Seen by ID who recommended bactrim.    She took the bactrim as prescribed with last dose taken 1/5/2023. She noticed improvement in swelling and erythema on bactrim but then noticed worsening swelling and erythema to the L foot and leg. She followed up in wound care on 12/29 and then again on 1/6. Per notes on 1/6, wound had doubled in size.     She has noticed decreased appetite, increased thirst, increased urination, and high glucoses. She does smoke cigarettes.

## 2023-01-09 NOTE — PROGRESS NOTES
Pharmacokinetic Initial Assessment: IV Vancomycin    Assessment/Plan:    Initiate intravenous vancomycin with loading dose of 1500 mg once followed by a maintenance dose of vancomycin 2000 mg IV every 12 hours  Desired empiric serum trough concentration is 10 to 20 mcg/mL  Draw vancomycin trough level 60 min prior to fourth dose on 01/11/2023 at approximately 00:0  Pharmacy will continue to follow and monitor vancomycin.      Please contact pharmacy at extension [phone] with any questions regarding this assessment.     Thank you for the consult,   Ramona Johnson       Patient brief summary:  Audrey Natarajan is a 52 y.o. female initiated on antimicrobial therapy with IV Vancomycin for treatment of suspected skin & soft tissue infection    Drug Allergies:   Review of patient's allergies indicates:   Allergen Reactions    Morphine Other (See Comments)     Patient had a psychotic episode after taking Morphine  Agitation, hallucinations    Penicillins Anaphylaxis     Tolerated cephalosporins in the past    Januvia [sitagliptin] Hives    Carbamazepine Other (See Comments)     hyponatremia       Actual Body Weight:   111.6 kg    Renal Function:   Estimated Creatinine Clearance: 121.7 mL/min (based on SCr of 0.7 mg/dL).,     Dialysis Method (if applicable):  N/A    CBC (last 72 hours):  Recent Labs   Lab Result Units 01/09/23  1206   WBC K/uL 14.57*   Hemoglobin g/dL 9.2*   Hematocrit % 28.9*   Platelets K/uL 872*   Gran % % 48.2   Lymph % % 34.7   Mono % % 12.2   Eosinophil % % 3.4   Basophil % % 0.9   Differential Method  Automated       Metabolic Panel (last 72 hours):  Recent Labs   Lab Result Units 01/09/23  1206   Sodium mmol/L 132*   Potassium mmol/L 4.7   Chloride mmol/L 94*   CO2 mmol/L 28   Glucose mg/dL 106   BUN mg/dL 14   Creatinine mg/dL 0.7   Albumin g/dL 3.1*   Total Bilirubin mg/dL 0.2   Alkaline Phosphatase U/L 75   AST U/L 14   ALT U/L 6*       Drug levels (last 3 results):  No results for input(s):  VANCOMYCINRA, VANCORANDOM, VANCOMYCINPE, VANCOPEAK, VANCOMYCINTR, VANCOTROUGH in the last 72 hours.    Microbiologic Results:  Microbiology Results (last 7 days)       Procedure Component Value Units Date/Time    Aerobic culture [[id number]] Collected: 01/09/23 1647    Order Status: Sent Specimen: Wound from Foot, Left Updated: 01/09/23 1648    Gram stain [[id number]] Collected: 01/09/23 1647    Order Status: Sent Specimen: Wound from Foot, Left Updated: 01/09/23 1648    Blood culture #2 **CANNOT BE ORDERED STAT** [[id number]] Collected: 01/09/23 1214    Order Status: Sent Specimen: Blood from Peripheral, Antecubital, Left Updated: 01/09/23 1220    Blood culture #1 **CANNOT BE ORDERED STAT** [[id number]] Collected: 01/09/23 1205    Order Status: Sent Specimen: Blood from Peripheral, Antecubital, Left Updated: 01/09/23 1220

## 2023-01-09 NOTE — TELEPHONE ENCOUNTER
Smoking Cessation counselor contacted patient regarding no show appointment for intake visit, but patient stated she is currently hospitalized. Counselor will attempt to conatct patient at a later time.       Carley Palomo RRT,MSW,LMSW,TTS  [phone]

## 2023-01-09 NOTE — ED PROVIDER NOTES
"Encounter Date: 1/9/2023    SCRIBE #1 NOTE: I, JORDEN SPEARS, am scribing for, and in the presence of,  Antonino Arreola MD. I have scribed the following portions of the note - Other sections scribed: HPI, ROS, PE, MDM.     History     Chief Complaint   Patient presents with    Wound Infection     53 yo fem to triage for left leg swelling/pain and possible DVT. Pt had surgery on her left leg in December 2022 and was instructed to come to ED for DVT. Pt has wounds to feet, she has wound care weekly.     Leg Swelling     52-year-old female with a PMHx of ADHD, arthritis, asthma, bipolar 1 disorder, cataract, cigarette smoker, COPD, CAD, depression, DM, diabetic foot ulcers, diabetic neuropathy, DVT of bilateral lower extremity, encounter for blood transfusion, Hx of blood clots, HTN, hypercholesteremia, irregular heartbeat, neuromuscular disorder, obese, PE, and restless leg syndrome presents to the ED with a chief complaint of left leg pain and swelling onset PTA. Patient states that she is having severe, left leg swelling and pain and is concerned of possible DVT. She further states that she could be having cellulitis to her left and right leg as it "starts with the redness and spreads up". She reports having wounds to bilateral feet and states that she changed and cleaned the dressing to her left foot this morning as there was drainage. She further reports that she is seen by wound care every Friday. No other exacerbating or alleviating factors. Denies any fevers, chills, abdominal pain, dysuria, or other associated symptoms.  Per reviewing previous notes she finished her Bactrim about a day and a half ago for cellulitis osteomyelitis of the foot.    The history is provided by the patient. No  was used.   Review of patient's allergies indicates:   Allergen Reactions    Morphine Other (See Comments)     Patient had a psychotic episode after taking Morphine  Agitation, hallucinations    Penicillins " "Anaphylaxis     Tolerated cephalosporins in the past    Januvia [sitagliptin] Hives    Carbamazepine Other (See Comments)     hyponatremia     Past Medical History:   Diagnosis Date    ADHD (attention deficit hyperactivity disorder)     Arthritis     Asthma     Bipolar 1 disorder     Cataract     Cigarette smoker     COPD (chronic obstructive pulmonary disease)     Coronary artery disease     A fib    Depression     bipolar manic depresson    Diabetes mellitus     Diabetic foot ulcers     Diabetic neuropathy     DVT of lower extremity, bilateral 07/2013    bilateral LE DVT. [place] filter placed.     Encounter for blood transfusion     History of blood clots 1. Left Leg=2003; 2.Bilateral Groin=Blood Clots= 5 or 6/ 2013 & 7/2013; 3. LLL of Lung=7/2013;  4. Lt. Lower Leg=7/2013.     Pt. had 1st Blood Clot after [id number], & Last=2013. [place] Filter= Rt.Lateral Neck.    HTN (hypertension) 06/06/2013    Pt states that she does not have hypertension    Hypercholesteremia     Irregular heartbeat     Neuromuscular disorder     neuropathy feet    Obese     PE (pulmonary embolism) 07/2013    bilat LE DVT.     Restless leg syndrome      Past Surgical History:   Procedure Laterality Date    ABDOMINAL SURGERY  2010    gastric sleeve    BILATERAL OOPHORECTOMY Bilateral 1/12/2015    CHOLECYSTECTOMY      DEBRIDEMENT OF FOOT Bilateral 5/10/2022    Procedure: DEBRIDEMENT, FOOT;  Surgeon: Maira De Los Santos DPM;  Location: [facility] OR;  Service: Podiatry;  Laterality: Bilateral;    Green' s filter Right 7/4/2012    Right Neck & Tunneled Down.    HERNIA REPAIR      "[place] [facility] Hernias Repaires around th [place].", pt. states    INCISION AND DRAINAGE FOOT Left 12/24/2022    Procedure: INCISION AND DRAINAGE, FOOT;  Surgeon: Fahad Razo DPM;  Location: [facility] OR;  Service: Podiatry;  Laterality: Left;    LAPAROSCOPIC CHOLECYSTECTOMY N/A 9/10/2020    Procedure: CHOLECYSTECTOMY, LAPAROSCOPIC;  Surgeon: Motnrell Gutierrez MD;  " Location: [facility] OR;  Service: General;  Laterality: N/A;  RN PREOP ----COVID Negative      OVARIAN CYST REMOVAL  3/13/2014    [facility] REMOVAL OF OVARY/TUBE(S)      SPLENECTOMY, TOTAL  2003    TONSILLECTOMY      as a child    TYMPANOSTOMY TUBE PLACEMENT  1976    VEIN SURGERY      Lt leg     Family History   Problem Relation Age of Onset    Hypertension Father     Diabetes Father     Heart disease Father     Cataracts Father     Diabetes Paternal Grandfather     Heart disease Paternal Grandfather     No Known Problems Mother     Ovarian cancer Maternal Grandmother          from this. ? age     No Known Problems Sister     No Known Problems Brother     No Known Problems Maternal Aunt     No Known Problems Maternal Uncle     No Known Problems Paternal Aunt     No Known Problems Paternal Uncle     No Known Problems Maternal Grandfather     Ovarian cancer Paternal Grandmother     Uterine cancer Neg Hx     Breast cancer Neg Hx     Colon cancer Neg Hx     Amblyopia Neg Hx     Blindness Neg Hx     Cancer Neg Hx     Glaucoma Neg Hx     Macular degeneration Neg Hx     Retinal detachment Neg Hx     Strabismus Neg Hx     Stroke Neg Hx     Thyroid disease Neg Hx      Social History     Tobacco Use    Smoking status: Every Day     Packs/day: 1.00     Years: 37.00     Pack years: 37.00     Types: Cigarettes     Last attempt to quit: 2020     Years since quittin.0    Smokeless tobacco: Never    Tobacco comments:     Enrolled in the [employer] on 5/3/14 ([facility] Member ID # [id number]). Ambulatory referral to Smoking Cessation Program   Substance Use Topics    Alcohol use: No     Alcohol/week: 0.0 standard drinks    Drug use: No     Review of Systems   Constitutional:  Negative for chills and fever.   HENT:  Negative for congestion and sore throat.    Eyes:  Negative for pain and visual disturbance.   Respiratory:  Negative for chest tightness and shortness of breath.    Cardiovascular:  Positive for leg  swelling. Negative for chest pain.   Gastrointestinal:  Negative for abdominal pain and nausea.   Endocrine: Negative for polydipsia and polyuria.   Genitourinary:  Negative for dysuria and flank pain.   Musculoskeletal:  Negative for back pain, neck pain and neck stiffness.        (+) Left leg pain     Skin:  Positive for wound. Negative for rash.   Allergic/Immunologic: Negative for immunocompromised state.   Neurological:  Negative for dizziness and weakness.   Hematological:  Does not bruise/bleed easily.   Psychiatric/Behavioral:  Negative for agitation and behavioral problems.    All other systems reviewed and are negative.    Physical Exam     Initial Vitals [01/09/23 1011]   BP Pulse Resp Temp SpO2   (!) 141/68 69 17 98.6 °F (37 °C) 97 %      MAP       --         Physical Exam    Nursing note and vitals reviewed.  Constitutional: She appears well-developed and well-nourished.   HENT:   Head: Normocephalic.   Right Ear: External ear normal.   Left Ear: External ear normal.   Mouth/Throat: Oropharynx is clear and moist.   Eyes: EOM are normal. Pupils are equal, round, and reactive to light.   Neck:   Normal range of motion.  Cardiovascular:  Normal rate and regular rhythm.           Pulmonary/Chest: Breath sounds normal. No respiratory distress. She has no wheezes.   Abdominal: Abdomen is soft. Bowel sounds are normal. She exhibits no distension. There is no abdominal tenderness.   Musculoskeletal:         General: No tenderness or edema. Normal range of motion.      Cervical back: Normal range of motion.      Left lower leg: Swelling present.      Comments: Mild redness to bilateral shins with left greater than right.      Neurological: She is alert and oriented to person, place, and time. She has normal strength. GCS score is 15. GCS eye subscore is 4. GCS verbal subscore is 5. GCS motor subscore is 6.   Skin: Skin is warm and dry. Capillary refill takes less than 2 seconds.   Psychiatric: She has a normal  mood and affect. Thought content normal.   Please refer to media.    ED Course   Procedures  Labs Reviewed   APTT - Abnormal; Notable for the following components:       Result Value    aPTT 32.5 (*)     All other components within normal limits   CBC W/ AUTO DIFFERENTIAL - Abnormal; Notable for the following components:    WBC 14.57 (*)     RBC 3.75 (*)     Hemoglobin 9.2 (*)     Hematocrit 28.9 (*)     MCV 77 (*)     MCH 24.5 (*)     MCHC 31.8 (*)     RDW 16.8 (*)     Platelets 872 (*)     Immature Granulocytes 0.6 (*)     Immature Grans (Abs) 0.09 (*)     Lymph # 5.1 (*)     Mono # 1.8 (*)     All other components within normal limits   COMPREHENSIVE METABOLIC PANEL - Abnormal; Notable for the following components:    Sodium 132 (*)     Chloride 94 (*)     Albumin 3.1 (*)     ALT 6 (*)     All other components within normal limits   C-REACTIVE PROTEIN - Abnormal; Notable for the following components:    CRP 38.3 (*)     All other components within normal limits   POCT GLUCOSE - Abnormal; Notable for the following components:    POCT Glucose 190 (*)     All other components within normal limits   CULTURE, BLOOD   CULTURE, BLOOD   PROTIME-INR   PROCALCITONIN   LACTIC ACID, PLASMA   SEDIMENTATION RATE          Imaging Results              X-Ray Foot Complete Left (Final result)  Result time 01/09/23 13:28:10      Final result by Gabe Chou MD (01/09/23 13:28:10)                   Impression:      1. Diffuse edema about the foot noting ulceration along the plantar aspect.  No convincing acute displaced fracture or dislocation of the foot.  In comparison to examination 12/22/2022, the edema appears to have decreased.  No convincing new osseous erosive or destructive process.      Electronically signed by: Gabe Chou MD  Date:    01/09/2023  Time:    13:28               Narrative:    EXAMINATION:  XR FOOT COMPLETE 3 VIEW LEFT    CLINICAL HISTORY:  .  Pain in unspecified foot    TECHNIQUE:  AP, lateral and  oblique views of the left foot were performed.    COMPARISON:  12/22/2022    FINDINGS:  Three views left foot.    There is osteopenia.  There is edema about the foot and toes.  There is Charcot appearance of the midfoot.  No radiopaque foreign body.  Allowing for extensive degenerative changes, no convincing acute displaced fracture or dislocation of the foot.  There is ulceration involving the plantar aspect of the foot.                                       US Lower Extremity Veins Left (Final result)  Result time 01/09/23 12:05:16      Final result by Gabe Chou MD (01/09/23 12:05:16)                   Impression:      No evidence of deep venous thrombosis in the left lower extremity.    Edema about the left lower extremity noting left inguinal lymphadenopathy, correlation is advised.      Electronically signed by: Gabe Chou MD  Date:    01/09/2023  Time:    12:05               Narrative:    EXAMINATION:  US LOWER EXTREMITY VEINS LEFT    CLINICAL HISTORY:  Other specified soft tissue disorders    TECHNIQUE:  Duplex and color flow Doppler evaluation and graded compression of the left lower extremity veins was performed.    COMPARISON:  12/22/2022    FINDINGS:  Duplex and color flow Doppler evaluation does not reveal any evidence of acute venous thrombosis in the common femoral, superficial femoral, greater saphenous, popliteal, peroneal, anterior tibial and posterior tibial veins of the left lower extremity.  There is no reflux to suggest valvular incompetence.  There is left lower extremity subcutaneous edema.  There is left inguinal lymphadenopathy.                                       Medications   vancomycin 1.5 g in dextrose 5 % 250 mL IVPB (ready to mix) (1,500 mg Intravenous New Bag 1/9/23 1315)   cefTRIAXone 2 gram/50 mL IVPB 2 g (has no administration in time range)   gabapentin capsule 300 mg (300 mg Oral Given 1/9/23 1253)   cyclobenzaprine tablet 10 mg (10 mg Oral Given 1/9/23 1253)    acetaminophen tablet 650 mg (650 mg Oral Given 1/9/23 1254)   ketorolac injection 15 mg (15 mg Intravenous Given 1/9/23 1254)     Medical Decision Making:   History:   Old Medical Records: I decided to obtain old medical records.  Initial Assessment:   This is an emergent evaluation of a 52 y.o. female who presents with a chief complaint of left leg pain and swelling onset PTA. Lactic acid, Plasma, Procalcitonin, CMP, CBC, PT/INR, APTT, POCT glucose, X-ray left foot, US lower extremity veins left ordered.   Differential Diagnosis:   Differential diagnosis include but are not limited to: Diabetic foot ulcers, Sepsis, DVT, osteomyelitis, dependent edema,    Patient's white count is increasing.  Patient reported having fevers.  CRP is decreasing.  X-ray results above.  Due to increasing redness up the leg off antibiotics for the past 48 hours and subjective chills with elevated white count, worsening wound and likely failed outpatient treatment started patient on vancomycin.  Tolerated cephalosporins so IV ceftriaxone ordered.  Patient was admitted further workup management.    After review of the patient's physical exam, ED testing, and history/symptoms, the patient requires additional care in the hospital overnight. Dr. Darden with  will accept the patient and any labs, imaging, or procedures were discussed. Pending labs/imaging/interventions include abx, labs. The diagnosis, treatment and plan were discussed with the patient. All questions or concerns have been addressed.    Clinical Tests:   Lab Tests: Ordered and Reviewed  Radiological Study: Ordered and Reviewed        Scribe Attestation:   Scribe #1: I performed the above scribed service and the documentation accurately describes the services I performed. I attest to the accuracy of the note.                 Scribe attestation: I, brant gonzalez, personally performed the services described in this documentation. All medical record entries made by the scribe  were at my direction and in my presence.  I have reviewed the chart and agree that the record reflects my personal performance and is accurate and complete.   Clinical Impression:   Final diagnoses:  [M79.89] Leg swelling  [M79.673] Foot pain  [L03.116] Cellulitis of left lower extremity (Primary)        ED Disposition Condition    Admit Stable                Antonino Arreola MD  01/09/23 [id number]

## 2023-01-09 NOTE — PROGRESS NOTES
Tone Mata MD VI                       [facility]                         01/09/2023    HPI:  Audrey Natarajan is a 52 y.o. female with   Patient Active Problem List   Diagnosis    Type II diabetes mellitus with neurological manifestations    Essential hypertension    COPD (chronic obstructive pulmonary disease)    Hyperlipidemia    Tobacco abuse    Mild protein malnutrition    Diabetic neuropathy    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    Leg swelling    Incisional hernia without mention of obstruction or gangrene    DM type 2 without retinopathy    History of DVT (deep vein thrombosis)    History of pulmonary embolus (PE)    Bipolar 1 disorder    Gastroparesis due to DM    Cellulitis of left lower extremity    Thrombocytosis    Class 2 severe obesity with serious comorbidity and body mass index (BMI) of 37.0 to 37.9 in adult    Type 2 diabetes mellitus    Acne    Other chronic pain    Nuclear sclerosis of both eyes    Bilateral ocular hypertension    Refractive error    Long term (current) use of anticoagulants    History of pulmonary embolism    DVT, recurrent, lower extremity, chronic, left    Decreased ROM of ankle    Decreased strength of lower extremity    Balance problem    Gait abnormality    Fatty liver    Hepatomegaly    History of bariatric surgery    Need for prophylactic vaccination against hepatitis A and hepatitis B    Liver fibrosis    History of diabetic ulcer of foot    Acute exacerbation of psychosis    Diabetic ulcer of left midfoot associated with type 2 diabetes mellitus, limited to breakdown of skin    Psychosis    SHAWN (obstructive sleep apnea)    Insomnia    Nasal congestion    Chronic deep vein thrombosis (DVT) of both lower extremities    Diabetic foot ulcer associated with type 2 diabetes mellitus    Anemia    Hyperkalemia    Lymphadenopathy, inguinal    Hyponatremia    Osteomyelitis of right foot    Iron deficiency anemia    Cellulitis  "of left foot    PAD (peripheral artery disease)    Constipation    Left midfoot ulcer, with necrosis of muscle    Charcot's joint of left foot    Open wound of left foot    being managed by PCP and specialists who is here today for evaluation of BLE pain.  9/1/19 presented to ER due to RLE cramping and LLE edema.  S/p LLE DVT 2003, unprovoked and treated with anticoagulation.  S/p PE and L femoral and PT DVT 2013 and had a Bard retrievable filter placed 2013; has had persistent pain and edema since that time.  Repeat US 9/1/19 in ER due to pain/edema showed chronic L PT.  Pt states 2013 after she injured her LLE and had a bleeding vein she had a "vein stripping" to the outside of her LLE.  Patient states location is BLE occurring for 6 yrs.  Associated signs and symptoms include discoloration.  Quality is sharp/throbbing and severity is 10/10 at worst, average 7/10.  Symptoms began 6 yrs ago.  Alleviating factors include elevation.  Worsening factors include dependency.  Denies claudication.      no MI  no Stroke  Tobacco use: 3 cig/day; prev 1 ppd x 35 yrs    12/2019: c/o severe LLE pain.  +compression daily.  C/o stockings being too tight.  States bilateral foot paresthesias.      3/2020:  Cont to c/o LLE pain.  Cannot tolerate compression.      5/2020:  C/o sharp LLE pain that limits her ambulating and sleep despite OTC pain medications.      8/2020:  Cont c/o LLE MSK and neuropathic pain.  +edema.  +compression/elevation > 3 months with continued decreased ability to perform ADLs and ambulation.    12/2020:  Cont to c/o LLE edema, pain despite compression and elevation > 3 mo.  Limiting her ADLs.    6/2021:  Presents with persistent BLE pain, edema and heaviness despite compression, elevation and diet changes > 3 mo limiting her ADLs.    9/2021:  S/p L GSV EVLT 7/30/21.  LLE feels better.    1/2023:  s/p L foot surgery.  C/o severe LLE and L foot pain.    Past Medical History:   Diagnosis Date    ADHD (attention " "deficit hyperactivity disorder)     Arthritis     Asthma     Bipolar 1 disorder     Cataract     Cigarette smoker     COPD (chronic obstructive pulmonary disease)     Coronary artery disease     A fib    Depression     bipolar manic depresson    Diabetes mellitus     Diabetic foot ulcers     Diabetic neuropathy     DVT of lower extremity, bilateral 07/2013    bilateral LE DVT. Estelita filter placed.     Encounter for blood transfusion     History of blood clots 1. Left Leg=2003; 2.Bilateral Groin=Blood Clots= 5 or 6/ 2013 & 7/2013; 3. LLL of Lung=7/2013;  4. Lt. Lower Leg=7/2013.     Pt. had 1st Blood Clot after Tyczrrpiunhg=[id number], & Last=2013. [place] Filter= Rt.Lateral Neck.    HTN (hypertension) 06/06/2013    Pt states that she does not have hypertension    Hypercholesteremia     Irregular heartbeat     Neuromuscular disorder     neuropathy feet    Obese     PE (pulmonary embolism) 07/2013    bilat LE DVT.     Restless leg syndrome      Past Surgical History:   Procedure Laterality Date    ABDOMINAL SURGERY  2010    gastric sleeve    BILATERAL OOPHORECTOMY Bilateral 1/12/2015    CHOLECYSTECTOMY      DEBRIDEMENT OF FOOT Bilateral 5/10/2022    Procedure: DEBRIDEMENT, FOOT;  Surgeon: Maira De Los Santos DPM;  Location: [facility] OR;  Service: Podiatry;  Laterality: Bilateral;    Green' s filter Right 7/4/2012    Right Neck & Tunneled Down.    HERNIA REPAIR      "[facility] of Hernias Repaires around th [place].", pt. states    INCISION AND DRAINAGE FOOT Left 12/24/2022    Procedure: INCISION AND DRAINAGE, FOOT;  Surgeon: Fahad Razo DPM;  Location: [facility] OR;  Service: Podiatry;  Laterality: Left;    LAPAROSCOPIC CHOLECYSTECTOMY N/A 9/10/2020    Procedure: CHOLECYSTECTOMY, LAPAROSCOPIC;  Surgeon: Montrell Gutierrez MD;  Location: [facility] OR;  Service: General;  Laterality: N/A;  RN PREOP 9/9----COVID Negative  9/9    OVARIAN CYST REMOVAL  3/13/2014    RI REMOVAL OF OVARY/TUBE(S)      SPLENECTOMY, TOTAL  July 2003    " TONSILLECTOMY      as a child    TYMPANOSTOMY TUBE PLACEMENT      VEIN SURGERY      Lt leg     Family History   Problem Relation Age of Onset    Hypertension Father     Diabetes Father     Heart disease Father     Cataracts Father     Diabetes Paternal Grandfather     Heart disease Paternal Grandfather     No Known Problems Mother     Ovarian cancer Maternal Grandmother          from this. ? age     No Known Problems Sister     No Known Problems Brother     No Known Problems Maternal Aunt     No Known Problems Maternal Uncle     No Known Problems Paternal Aunt     No Known Problems Paternal Uncle     No Known Problems Maternal Grandfather     Ovarian cancer Paternal Grandmother     Uterine cancer Neg Hx     Breast cancer Neg Hx     Colon cancer Neg Hx     Amblyopia Neg Hx     Blindness Neg Hx     Cancer Neg Hx     Glaucoma Neg Hx     Macular degeneration Neg Hx     Retinal detachment Neg Hx     Strabismus Neg Hx     Stroke Neg Hx     Thyroid disease Neg Hx      Social History     Socioeconomic History    Marital status: Significant Other   Tobacco Use    Smoking status: Every Day     Packs/day: 1.00     Years: 37.00     Pack years: 37.00     Types: Cigarettes     Last attempt to quit: 2020     Years since quittin.0    Smokeless tobacco: Never    Tobacco comments:     Enrolled in the [employer] on 5/3/14 ([facility] Member ID # [id number]). Ambulatory referral to Smoking Cessation Program   Substance and Sexual Activity    Alcohol use: No     Alcohol/week: 0.0 standard drinks    Drug use: No    Sexual activity: Yes     Partners: Male   Social History Narrative     from her  of 20 years    Lives by herself since 2019     Social Determinants of Health     Financial Resource Strain: Unknown    Difficulty of Paying Living Expenses: Patient refused   Food Insecurity: Unknown    Worried About Running Out of Food in the Last Year: Patient refused    Ran Out of Food in the Last Year: Patient  refused   Transportation Needs: Unknown    Lack of Transportation (Medical): Patient refused    Lack of Transportation (Non-Medical): Patient refused   Physical Activity: Unknown    Days of Exercise per Week: Patient refused    Minutes of Exercise per Session: Patient refused   Stress: Unknown    Feeling of Stress : Patient refused   Social Connections: Unknown    Frequency of Communication with Friends and Family: Patient refused    Frequency of Social Gatherings with Friends and Family: Patient refused    Attends Hindu Services: Patient refused    Active Member of Clubs or Organizations: Patient refused    Attends Club or Organization Meetings: Patient refused    Marital Status: Patient refused   Housing Stability: Unknown    Unable to Pay for Housing in the Last Year: Patient refused    Unstable Housing in the Last Year: Patient refused       Current Outpatient Medications:     acetaminophen (TYLENOL) 500 MG tablet, Take 2 tablets (1,000 mg total) by mouth every 6 (six) hours as needed for Pain., Disp: 30 tablet, Rfl: 0    albuterol (PROVENTIL/VENTOLIN HFA) 90 mcg/actuation inhaler, INHALE 2 PUFFS INTO THE LUNGS EVERY 6 HOURS AS NEEDED FOR WHEEZING. RESCUE, Disp: 6.7 g, Rfl: 2    albuterol-ipratropium (DUO-NEB) 2.5 mg-0.5 mg/3 mL nebulizer solution, Take 3 mLs by nebulization every 6 (six) hours as needed for Wheezing or Shortness of Breath. Rescue, Disp: 1 each, Rfl: 0    ammonium lactate (LAC-HYDRIN) 12 % lotion, APPL Y ONCE TOPICALLY TWICE DAILY FOR 30 DAYS, Disp: , Rfl:     apixaban (ELIQUIS) 5 mg Tab, Take 1 tablet (5 mg total) by mouth 2 (two) times daily., Disp: 60 tablet, Rfl: 3    BINAXNOW COVID-19 AG SELF TEST Kit, TEST AS DIRECTED TODAY, Disp: , Rfl:     bumetanide (BUMEX) 1 MG tablet, Take 1 tablet (1 mg total) by mouth once daily., Disp: 30 tablet, Rfl: 5    carBAMazepine (TEGRETOL) 200 mg tablet, Take 400 mg by mouth 2 (two) times daily., Disp: , Rfl:     divalproex (DEPAKOTE) 250 MG EC tablet,  Take 5 tablets (1,250 mg total) by mouth every evening., Disp: 150 tablet, Rfl: 11    divalproex (DEPAKOTE) 500 MG TbEC, Take 1 tablet (500 mg total) by mouth once daily. PO QAM, Disp: 30 tablet, Rfl: 11    DUPIXENT  mg/2 mL PnIj, Inject into the skin., Disp: , Rfl:     fluticasone propionate (FLONASE) 50 mcg/actuation nasal spray, 2 sprays (100 mcg total) by Each Nostril route daily as needed (Nasal congestion)., Disp: 9.9 mL, Rfl: 0    fluticasone-salmeterol diskus inhaler 250-50 mcg, Inhale 1 puff into the lungs 2 (two) times daily. Controller, Disp: 60 each, Rfl: 2    gabapentin (NEURONTIN) 300 MG capsule, TAKE 2 CAPSULES(600 MG) BY MOUTH TWICE DAILY, Disp: 120 capsule, Rfl: 2    hydrOXYzine (ATARAX) 50 MG tablet, Take 50 mg by mouth 4 (four) times daily as needed., Disp: , Rfl:     lisinopriL 10 MG tablet, Take 1 tablet (10 mg total) by mouth once daily., Disp: 90 tablet, Rfl: 1    loratadine (CLARITIN) 10 mg tablet, Take 1 tablet (10 mg total) by mouth once daily., Disp: 90 tablet, Rfl: 3    metFORMIN (GLUCOPHAGE) 1000 MG tablet, Take 1 tablet (1,000 mg total) by mouth 2 (two) times daily with meals., Disp: 180 tablet, Rfl: 1    methocarbamoL (ROBAXIN) 500 MG Tab, Take 500 mg by mouth. Frequency could not be confirmed., Disp: , Rfl:     metoprolol tartrate (LOPRESSOR) 25 MG tablet, Take 1 tablet (25 mg total) by mouth 2 (two) times daily., Disp: 180 tablet, Rfl: 3    multivitamin Tab, Take 1 tablet by mouth once daily., Disp: 30 tablet, Rfl: 2    nystatin (NYSTOP) powder, APPLY TO ABDOMINAL AND BREAST SKIN FOLD TWICE DAILY., Disp: 60 g, Rfl: 0    pantoprazole (PROTONIX) 40 MG tablet, Take 1 tablet (40 mg total) by mouth once daily., Disp: 90 tablet, Rfl: 1    potassium chloride (MICRO-K) 10 MEQ CpSR, Take 1 capsule (10 mEq total) by mouth once daily., Disp: 30 capsule, Rfl: 5    pravastatin (PRAVACHOL) 40 MG tablet, TAKE 1 TABLET(40 MG) BY MOUTH EVERY EVENING, Disp: 90 tablet, Rfl: 1    risperiDONE  (RISPERDAL M-TABS) 3 MG disintegrating tablet, Take 1 tablet (3 mg total) by mouth 2 (two) times daily., Disp: 60 tablet, Rfl: 11    semaglutide (OZEMPIC) 1 mg/dose (4 mg/3 mL), Inject 1 mg into the skin every 7 days., Disp: 1 pen, Rfl: 2    traMADoL (ULTRAM) 50 mg tablet, Take 1 tablet (50 mg total) by mouth every 8 (eight) hours as needed for Pain., Disp: 21 tablet, Rfl: 0    triamcinolone acetonide 0.025% (KENALOG) 0.025 % cream, Apply topically 2 (two) times daily., Disp: 80 g, Rfl: 0    VYVANSE 40 mg Cap, Take 40 mg by mouth once daily., Disp: , Rfl:     aspirin 81 MG Chew, Take 1 tablet (81 mg total) by mouth once daily., Disp: 30 tablet, Rfl: 11    REVIEW OF SYSTEMS:  General: No fevers or chills; ENT: No sore throat; Allergy and Immunology: no persistent infections; Hematological and Lymphatic: No history of bleeding or easy bruising; Endocrine: negative; Respiratory: no cough, shortness of breath, or wheezing; Cardiovascular: no chest pain or dyspnea on exertion; Gastrointestinal: no abdominal pain/back, change in bowel habits, or bloody stools; Genito-Urinary: no dysuria, trouble voiding, or hematuria; Musculoskeletal: negative; Neurological: no TIA or stroke symptoms; Psychiatric: no nervousness, anxiety or depression.    PHYSICAL EXAM:                General appearance:  Alert, well-appearing, and in no distress.  Oriented to person, place, and time                    Neurological: Normal speech, no focal findings noted; CN II - XII grossly intact. RLE with sensation to light touch, LLE with sensation to light touch.            Musculoskeletal: Digits/nail without cyanosis/clubbing.  Strength 5/5 BLE.                    Neck: Supple, no significant adenopathy, no carotid bruit can be auscultated                  Chest:  Clear to auscultation, no wheezes, rales or rhonchi, symmetric air entry. No use of accessory muscles               Cardiac: Normal rate and regular rhythm, S1 and S2 normal             Abdomen: Soft, nontender, nondistended, no masses or organomegaly, no hernia     No rebound tenderness noted; bowel sounds normal     Pulsatile aortic mass is non palpable.     No groin adenopathy      Extremities:        2+ R DP pulse, 2+ L DP pulse     1+ RLE edema, 1+ LLE edema- BLE lymphedema    Skin: RLE without wounds; LLE without wounds    LAB RESULTS:  No results found for: CBC  Lab Results   Component Value Date    LABPROT 11.0 12/22/2022    INR 1.0 12/22/2022     Lab Results   Component Value Date     12/25/2022    K 4.8 12/25/2022     12/25/2022    CO2 26 12/25/2022     (H) 12/25/2022    BUN 9 12/25/2022    CREATININE 0.6 12/25/2022    CALCIUM 8.4 (L) 12/25/2022    ANIONGAP 8 12/25/2022    EGFRNONAA >60 05/12/2022     Lab Results   Component Value Date    WBC 13.02 (H) 01/06/2023    RBC 3.79 (L) 01/06/2023    HGB 9.7 (L) 01/06/2023    HCT 29.7 (L) 01/06/2023    MCV 78 (L) 01/06/2023    MCH 25.6 (L) 01/06/2023    MCHC 32.7 01/06/2023    RDW 17.0 (H) 01/06/2023     (H) 01/06/2023    MPV 9.3 01/06/2023    GRAN 5.9 01/06/2023    GRAN 45.5 01/06/2023    LYMPH 5.0 (H) 01/06/2023    LYMPH 38.1 01/06/2023    MONO 1.6 (H) 01/06/2023    MONO 12.4 01/06/2023    EOS 0.4 01/06/2023    BASO 0.09 01/06/2023    EOSINOPHIL 2.8 01/06/2023    BASOPHIL 0.7 01/06/2023    DIFFMETHOD Automated 01/06/2023     .  Lab Results   Component Value Date    HGBA1C 7.1 (H) 12/22/2022       IMAGING:  All pertinent imaging has been reviewed and interpreted independently.    Venous US 9/2019: Left 1 of 2 PT vein with thrombus present, similar to previous US     9/16/19 JEYSON:  1. Right lower extremity pressures and waveforms indicate no hemodynamically significant arterial occlusive disease.  2. Left lower extremity pressures and waveforms indicate no hemodynamically significant arterial occlusive disease.     Venous reflux 12/12/19: RLE with GSV reflux at distal thigh to calf.  No DVT.    DVT LLE US 12/16/19: No LLE  DVT    LLE venous US 3/2020:  Left lower extremity venous ultrasound shows greater saphenous vein reflux at the distal thigh.  There is popliteal vein reflux.  There is no lesser saphenous vein reflux.  There is no anterior accessory saphenous venous reflux.  There is no DVT.    11/2020:  1. Right lower extremity venous ultrasound shows greater saphenous vein reflux at the knee and calf.  There is no lesser saphenous vein reflux.  There is no anterior accessory saphenous venous reflux.  There is no DVT.  2. Left lower extremity venous ultrasound shows greater saphenous vein reflux at the saphenofemoral junction and the distal thigh.  There is popliteal vein reflux.  There is no lesser saphenous vein reflux.  There is no anterior accessory saphenous venous reflux.  There is no DVT.    IMP/PLAN:  52 y.o. female with   Patient Active Problem List   Diagnosis    Type II diabetes mellitus with neurological manifestations    Essential hypertension    COPD (chronic obstructive pulmonary disease)    Hyperlipidemia    Tobacco abuse    Mild protein malnutrition    Diabetic neuropathy    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    Leg swelling    Incisional hernia without mention of obstruction or gangrene    DM type 2 without retinopathy    History of DVT (deep vein thrombosis)    History of pulmonary embolus (PE)    Bipolar 1 disorder    Gastroparesis due to DM    Cellulitis of left lower extremity    Thrombocytosis    Class 2 severe obesity with serious comorbidity and body mass index (BMI) of 37.0 to 37.9 in adult    Type 2 diabetes mellitus    Acne    Other chronic pain    Nuclear sclerosis of both eyes    Bilateral ocular hypertension    Refractive error    Long term (current) use of anticoagulants    History of pulmonary embolism    DVT, recurrent, lower extremity, chronic, left    Decreased ROM of ankle    Decreased strength of lower extremity    Balance problem    Gait abnormality    Fatty liver     Hepatomegaly    History of bariatric surgery    Need for prophylactic vaccination against hepatitis A and hepatitis B    Liver fibrosis    History of diabetic ulcer of foot    Acute exacerbation of psychosis    Diabetic ulcer of left midfoot associated with type 2 diabetes mellitus, limited to breakdown of skin    Psychosis    SHAWN (obstructive sleep apnea)    Insomnia    Nasal congestion    Chronic deep vein thrombosis (DVT) of both lower extremities    Diabetic foot ulcer associated with type 2 diabetes mellitus    Anemia    Hyperkalemia    Lymphadenopathy, inguinal    Hyponatremia    Osteomyelitis of right foot    Iron deficiency anemia    Cellulitis of left foot    PAD (peripheral artery disease)    Constipation    Left midfoot ulcer, with necrosis of muscle    Charcot's joint of left foot    Open wound of left foot    being managed by PCP and specialists who is here today for evaluation of BLE pain and edema.    -LLE pain and edema likely due to post thrombotic syndrome, no DVT on repeat venous duplex US with L distal thigh GSV reflux -recommend compression with Rx stockings, elevation, dietary changes associated with water and sodium intake discussed at length with patient   -R GSV distal reflux symptomatic although not lifestyle limiting- recommend compression with Rx stockings, elevation, dietary changes associated with water and sodium intake discussed at length with patient  -Rec cont exercise  -Rec optimization of neuropathic and MSK pain with NSGY, Pain mgmt and Rheumatology - cont Neuro and NSGY recs  -Cont Hematology for comprehensive mgmt of thrombotic events  -Lymphedema clinic and pumps - Patient has tried and failed compression of 20-30 mm Hg, elevation and exercise for >1 month period.  Basic pump trial performed and discontinued due to sensitivity and pain to static pressure.  Symptoms of swelling, pain and hyperplasia present   -s/p L GSV EVLT recovering well with ablated vein on US   -rec  emergent LLE Venous US; unable to schedule outpatient - rec eval in ER    I spent 12 minutes evaluating this patient and greater than 50% of the time was spent counseling, coordinator care and discussing the plan of care.  All questions were answered and patient stated understanding with agreement with the above treatment plan.    Tone Mata MD [facility]  Vascular and Endovascular Surgery

## 2023-01-09 NOTE — H&P
[facility] Emergency Dept  [facility] Medicine  History & Physical    Patient Name: Audrey Natarajan  MRN: [id number]  Patient Class: IP- Inpatient  Admission Date: 1/9/2023  Attending Physician: Keyona Darden MD   Primary Care Provider: Donaldo Pena MD         Patient information was obtained from patient, past medical records and ER records.     Subjective:     Principal Problem:Diabetic ulcer of left midfoot associated with type 2 diabetes mellitus, limited to breakdown of skin    Chief Complaint:   Chief Complaint   Patient presents with    Wound Infection     53 yo fem to triage for left leg swelling/pain and possible DVT. Pt had surgery on her left leg in December 2022 and was instructed to come to ED for DVT. Pt has wounds to feet, she has wound care weekly.     Leg Swelling        HPI: Ms Audrey Natarajan is a 52 y.o. woman with DM who presents with worsening L leg swelling.     She was recently admitted 12/22-27/2022 with worsening L plantar foot ulceration. S/p debridement by Podiatry. Bone cultures no growth. Wound culture with MRSA. Seen by ID who recommended bactrim.    She took the bactrim as prescribed with last dose taken 1/5/2023. She noticed improvement in swelling and erythema on bactrim but then noticed worsening swelling and erythema to the L foot and leg. She followed up in wound care on 12/29 and then again on 1/6. Per notes on 1/6, wound had doubled in size.     She has noticed decreased appetite, increased thirst, increased urination, and high glucoses. She does smoke cigarettes.         Past Medical History:   Diagnosis Date    ADHD (attention deficit hyperactivity disorder)     Arthritis     Asthma     Bipolar 1 disorder     Cataract     Cigarette smoker     COPD (chronic obstructive pulmonary disease)     Coronary artery disease     A fib    Depression     bipolar manic depresson    Diabetes mellitus     Diabetic foot ulcers     Diabetic neuropathy     DVT of  "lower extremity, bilateral 07/2013    bilateral LE DVT. Estelita filter placed.     Encounter for blood transfusion     History of blood clots 1. Left Leg=2003; 2.Bilateral Groin=Blood Clots= 5 or 6/ 2013 & 7/2013; 3. LLL of Lung=7/2013;  4. Lt. Lower Leg=7/2013.     Pt. had 1st Blood Clot after Phqnvgktouoh=5423, & Last=2013. Estelita Filter= Rt.Lateral Neck.    HTN (hypertension) 06/06/2013    Pt states that she does not have hypertension    Hypercholesteremia     Irregular heartbeat     Neuromuscular disorder     neuropathy feet    Obese     PE (pulmonary embolism) 07/2013    bilat LE DVT.     Restless leg syndrome        Past Surgical History:   Procedure Laterality Date    ABDOMINAL SURGERY  2010    gastric sleeve    BILATERAL OOPHORECTOMY Bilateral 1/12/2015    CHOLECYSTECTOMY      DEBRIDEMENT OF FOOT Bilateral 5/10/2022    Procedure: DEBRIDEMENT, FOOT;  Surgeon: Maira De Los Santos DPM;  Location: [facility] OR;  Service: Podiatry;  Laterality: Bilateral;    Green' s filter Right 7/4/2012    Right Neck & Tunneled Down.    HERNIA REPAIR      "[place] of Hernias Repaires around th [place].", pt. states    INCISION AND DRAINAGE FOOT Left 12/24/2022    Procedure: INCISION AND DRAINAGE, FOOT;  Surgeon: Fahad Razo DPM;  Location: [facility] OR;  Service: Podiatry;  Laterality: Left;    LAPAROSCOPIC CHOLECYSTECTOMY N/A 9/10/2020    Procedure: CHOLECYSTECTOMY, LAPAROSCOPIC;  Surgeon: Montrell Gutierrez MD;  Location: [facility] OR;  Service: General;  Laterality: N/A;  RN PREOP 9/9----COVID Negative  9/9    OVARIAN CYST REMOVAL  3/13/2014    CO REMOVAL OF OVARY/TUBE(S)      SPLENECTOMY, TOTAL  July 2003    TONSILLECTOMY      as a child    TYMPANOSTOMY TUBE PLACEMENT  1976    VEIN SURGERY  2003    Lt leg       Review of patient's allergies indicates:   Allergen Reactions    Morphine Other (See Comments)     Patient had a psychotic episode after taking Morphine  Agitation, hallucinations    Penicillins " Anaphylaxis     Tolerated cephalosporins in the past    Januvia [sitagliptin] Hives    Carbamazepine Other (See Comments)     hyponatremia       No current facility-administered medications on file prior to encounter.     Current Outpatient Medications on File Prior to Encounter   Medication Sig    acetaminophen (TYLENOL) 500 MG tablet Take 2 tablets (1,000 mg total) by mouth every 6 (six) hours as needed for Pain.    DUPIXENT  mg/2 mL PnIj Inject into the skin.    gabapentin (NEURONTIN) 300 MG capsule TAKE 2 CAPSULES(600 MG) BY MOUTH TWICE DAILY    lisinopriL 10 MG tablet Take 1 tablet (10 mg total) by mouth once daily.    loratadine (CLARITIN) 10 mg tablet Take 1 tablet (10 mg total) by mouth once daily.    metFORMIN (GLUCOPHAGE) 1000 MG tablet Take 1 tablet (1,000 mg total) by mouth 2 (two) times daily with meals.    methocarbamoL (ROBAXIN) 500 MG Tab Take 500 mg by mouth. Frequency could not be confirmed.    metoprolol tartrate (LOPRESSOR) 25 MG tablet Take 1 tablet (25 mg total) by mouth 2 (two) times daily.    multivitamin Tab Take 1 tablet by mouth once daily.    pantoprazole (PROTONIX) 40 MG tablet Take 1 tablet (40 mg total) by mouth once daily.    potassium chloride (MICRO-K) 10 MEQ CpSR Take 1 capsule (10 mEq total) by mouth once daily.    pravastatin (PRAVACHOL) 40 MG tablet TAKE 1 TABLET(40 MG) BY MOUTH EVERY EVENING    risperiDONE (RISPERDAL M-TABS) 3 MG disintegrating tablet Take 1 tablet (3 mg total) by mouth 2 (two) times daily.    semaglutide (OZEMPIC) 1 mg/dose (4 mg/3 mL) Inject 1 mg into the skin every 7 days.    traMADoL (ULTRAM) 50 mg tablet Take 1 tablet (50 mg total) by mouth every 8 (eight) hours as needed for Pain.    triamcinolone acetonide 0.025% (KENALOG) 0.025 % cream Apply topically 2 (two) times daily.    VYVANSE 40 mg Cap Take 40 mg by mouth once daily.    albuterol (PROVENTIL/VENTOLIN HFA) 90 mcg/actuation inhaler INHALE 2 PUFFS INTO THE LUNGS EVERY 6  HOURS AS NEEDED FOR WHEEZING. RESCUE    albuterol-ipratropium (DUO-NEB) 2.5 mg-0.5 mg/3 mL nebulizer solution Take 3 mLs by nebulization every 6 (six) hours as needed for Wheezing or Shortness of Breath. Rescue    ammonium lactate (LAC-HYDRIN) 12 % lotion APPL Y ONCE TOPICALLY TWICE DAILY FOR 30 DAYS    apixaban (ELIQUIS) 5 mg Tab Take 1 tablet (5 mg total) by mouth 2 (two) times daily.    aspirin 81 MG Chew Take 1 tablet (81 mg total) by mouth once daily.    BINAXNOW COVID-19 AG SELF TEST Kit TEST AS DIRECTED TODAY    bumetanide (BUMEX) 1 MG tablet Take 1 tablet (1 mg total) by mouth once daily.    carBAMazepine (TEGRETOL) 200 mg tablet Take 400 mg by mouth 2 (two) times daily.    divalproex (DEPAKOTE) 250 MG EC tablet Take 5 tablets (1,250 mg total) by mouth every evening.    divalproex (DEPAKOTE) 500 MG TbEC Take 1 tablet (500 mg total) by mouth once daily. PO QAM    fluticasone propionate (FLONASE) 50 mcg/actuation nasal spray 2 sprays (100 mcg total) by Each Nostril route daily as needed (Nasal congestion).    fluticasone-salmeterol diskus inhaler 250-50 mcg Inhale 1 puff into the lungs 2 (two) times daily. Controller    hydrOXYzine (ATARAX) 50 MG tablet Take 50 mg by mouth 4 (four) times daily as needed.    nystatin (NYSTOP) powder APPLY TO ABDOMINAL AND BREAST SKIN FOLD TWICE DAILY.    [DISCONTINUED] diclofenac sodium (VOLTAREN) 1 % Gel Apply 2 g topically 4 (four) times daily as needed (Apply to painful area up to 4 times a day as needed for pain). Apply to painful area 4 times a day as needed for pain    [DISCONTINUED] furosemide (LASIX) 20 MG tablet TAKE 1 TABLET(20 MG) BY MOUTH EVERY DAY    [DISCONTINUED] QUEtiapine (SEROQUEL) 200 MG Tab Take 1 tablet (200 mg total) by mouth before breakfast.     Family History       Problem Relation (Age of Onset)    Cataracts Father    Diabetes Father, Paternal Grandfather    Heart disease Father, Paternal Grandfather    Hypertension Father    No Known  Problems Mother, Sister, Brother, Maternal Aunt, Maternal Uncle, Paternal Aunt, Paternal Uncle, Maternal Grandfather    Ovarian cancer Maternal Grandmother, Paternal Grandmother          Tobacco Use    Smoking status: Every Day     Packs/day: 1.00     Years: 37.00     Pack years: 37.00     Types: Cigarettes     Last attempt to quit: 2020     Years since quittin.0    Smokeless tobacco: Never    Tobacco comments:     Enrolled in the [employer] on 5/3/14 ([facility] Member ID # [id number]). Ambulatory referral to Smoking Cessation Program   Substance and Sexual Activity    Alcohol use: No     Alcohol/week: 0.0 standard drinks    Drug use: No    Sexual activity: Yes     Partners: Male     Review of Systems   Constitutional:  Positive for activity change, appetite change and fever. Negative for chills.   HENT:  Positive for congestion. Negative for sinus pressure, sinus pain and trouble swallowing.    Respiratory:  Negative for cough, chest tightness and shortness of breath.    Cardiovascular:  Positive for leg swelling. Negative for chest pain and palpitations.   Gastrointestinal:  Negative for abdominal pain, constipation, diarrhea, nausea and vomiting.   Endocrine: Positive for polyuria.   Genitourinary:  Negative for difficulty urinating.   Musculoskeletal:  Negative for arthralgias and myalgias.   Skin:  Positive for color change, rash and wound.   Neurological:  Positive for numbness. Negative for weakness and headaches.   Hematological:  Negative for adenopathy.   Psychiatric/Behavioral:  Negative for confusion.    Objective:     Vital Signs (Most Recent):  Temp: 98.6 °F (37 °C) (23 1011)  Pulse: 69 (23 1430)  Resp: 17 (23 1430)  BP: (!) 108/53 (23 1433)  SpO2: 95 % (23 1430)   Vital Signs (24h Range):  Temp:  [98.6 °F (37 °C)] 98.6 °F (37 °C)  Pulse:  [63-71] 69  Resp:  [17-18] 17  SpO2:  [95 %-98 %] 95 %  BP: (108-150)/(53-72) 108/53     Weight: 111.6 kg (246 lb)  Body  mass index is 39.71 kg/m².    Physical Exam  Vitals and nursing note reviewed.   Constitutional:       General: She is not in acute distress.     Appearance: She is obese. She is not ill-appearing, toxic-appearing or diaphoretic.   HENT:      Head: Normocephalic and atraumatic.      Nose: Nose normal.      Mouth/Throat:      Mouth: Mucous membranes are moist.   Eyes:      General: No scleral icterus.     Conjunctiva/sclera: Conjunctivae normal.   Cardiovascular:      Rate and Rhythm: Normal rate and regular rhythm.      Pulses: Normal pulses.      Heart sounds: Normal heart sounds. No murmur heard.    No gallop.   Pulmonary:      Effort: Pulmonary effort is normal. No respiratory distress.      Breath sounds: Normal breath sounds. No wheezing or rales.      Comments: Room air  Abdominal:      General: Bowel sounds are normal. There is no distension.      Palpations: Abdomen is soft. There is no mass.      Tenderness: There is no abdominal tenderness. There is no guarding.   Musculoskeletal:      Right lower leg: No edema.      Left lower leg: No edema.      Comments: L foot bandaged   Skin:     General: Skin is warm and dry.      Findings: Erythema (left lower leg) and rash present.   Neurological:      Mental Status: She is alert and oriented to person, place, and time.      Sensory: Sensory deficit (bilateral feet) present.      Motor: No weakness.         Significant Labs: All pertinent labs within the past 24 hours have been reviewed.    Significant Imaging: I have reviewed all pertinent imaging results/findings within the past 24 hours.    Assessment/Plan:     * Diabetic ulcer of left midfoot associated with type 2 diabetes mellitus, limited to breakdown of skin  Admitted with worsening L plantar DM foot wound. S/p I&D in 12/2022 with bone cultures no growth and treated for skin/soft tissue infection with bactrim x10 days. Worsening with enlarging wound and cellulitis involving the foot and the leg.   - On  vanc/ceftriaxone  - Podiatry consulted     Patient's FSGs are uncontrolled due to hyperglycemia on current medication regimen.  Last A1c reviewed-   Lab Results   Component Value Date    HGBA1C 7.1 (H) 12/22/2022     Most recent fingerstick glucose reviewed-   Recent Labs   Lab 01/09/23  1013   POCTGLUCOSE 190*     Current correctional scale  Low  Maintain anti-hyperglycemic dose as follows-   Antihyperglycemics (From admission, onward)    Start     Stop Route Frequency Ordered    01/09/23 2100  insulin detemir U-100 pen 10 Units         -- SubQ Nightly 01/09/23 1620    01/09/23 1730  insulin aspart U-100 pen 3 Units         -- SubQ 3 times daily with meals 01/09/23 1620    01/09/23 1719  insulin aspart U-100 pen 0-5 Units         -- SubQ Before meals & nightly PRN 01/09/23 1620        Hold Oral hypoglycemics while patient is in the hospital.    Charcot's joint of left foot  This contributes to wound formation       Cellulitis of left foot  See DM foot wound      Iron deficiency anemia  Iron deficient by labs 12/2022. Not appropriate for IV iron in setting of active infection.   - started PO iron       Hyponatremia  Na slightly low on admit. May be attributed to antipsychotics. Not symptomatic.   - BMP in AM      Class 2 severe obesity with serious comorbidity and body mass index (BMI) of 39.0 to 39.9 in adult  Body mass index is 39.71 kg/m². Morbid obesity complicates all aspects of disease management from diagnostic modalities to treatment. Weight loss encouraged and health benefits explained to patient.         Thrombocytosis  Plts elevated most likely due to iron deficiency. Treat iron deficiency.       Cellulitis of left lower extremity  See DM foot wound      Bipolar 1 disorder  No signs of acute bibi or depression. She is NOT taking carbamazepine.   - continue risperdal- takes 3mg BID  - continue depakote- takes 1500mg QHS      History of DVT (deep vein thrombosis)  History of DVT and PE. Most recent US  shows no DVT.   - continue home eliquis       Tobacco abuse  Patient was counseled on smoking cessation for 4 minutes. Encouraged cessation.       Hyperlipidemia  Continue home pravastatin       COPD (chronic obstructive pulmonary disease)  No PFTs to review. No signs of acute exacerbation. Stable on room air.       Essential hypertension  BP is borderline. Hold home metoprolol and lisinopril for now.       Type II diabetes mellitus with neurological manifestations  With neuropathy affecting feet. On gabapentin but not filled since 11/2022  - continue gabapentin       VTE Risk Mitigation (From admission, onward)         Ordered     apixaban tablet 5 mg  2 times daily         01/09/23 1620     IP VTE HIGH RISK PATIENT  Once         01/09/23 1620     Reason for No Pharmacological VTE Prophylaxis  Once        Question:  Reasons:  Answer:  Already adequately anticoagulated on oral Anticoagulants    01/09/23 1620                   Keyona Darden MD  Department of Hospital Medicine   [facility] - Emergency Dept

## 2023-01-09 NOTE — ASSESSMENT & PLAN NOTE
Body mass index is 39.71 kg/m². Morbid obesity complicates all aspects of disease management from diagnostic modalities to treatment. Weight loss encouraged and health benefits explained to patient.

## 2023-01-09 NOTE — ED TRIAGE NOTES
Pt presents to ED as per PCP request while in Dr's office. Pt had surgery to left foot 2 weeks ago and presents with left leg swelling. Pain and redness. Pt concerned about DVT. Rates pain to leg 10/10. Pt states she took tramadol this am and has not had any relief. PT AAOx4.

## 2023-01-09 NOTE — ASSESSMENT & PLAN NOTE
With neuropathy affecting feet. On gabapentin but not filled since 11/2022  - continue gabapentin

## 2023-01-09 NOTE — ASSESSMENT & PLAN NOTE
Iron deficient by labs 12/2022. Not appropriate for IV iron in setting of active infection.   - started PO iron

## 2023-01-09 NOTE — PHARMACY MED REC
"  Admission Medication History     The home medication history was taken by Erlinda Cervantes.    You may go to "Admission" then "Reconcile Home Medications" tabs to review and/or act upon these items.     The home medication list has been updated by the Pharmacy department.   Please read ALL comments highlighted in yellow.   Please address this information as you see fit.    Feel free to contact us if you have any questions or require assistance.      Medications listed below were obtained from: Patient/family and Analytic software- [employer]  (Not in a hospital admission)      Potential issues to be addressed PRIOR TO DISCHARGE      Erlinda Cervantes  [phone]               .        "

## 2023-01-10 LAB
ANION GAP SERPL CALC-SCNC: 4 MMOL/L (ref 8–16)
BASOPHILS # BLD AUTO: 0.11 K/UL (ref 0–0.2)
BASOPHILS NFR BLD: 1.1 % (ref 0–1.9)
BUN SERPL-MCNC: 17 MG/DL (ref 6–20)
CALCIUM SERPL-MCNC: 8.5 MG/DL (ref 8.7–10.5)
CHLORIDE SERPL-SCNC: 98 MMOL/L (ref 95–110)
CO2 SERPL-SCNC: 30 MMOL/L (ref 23–29)
CREAT SERPL-MCNC: 0.7 MG/DL (ref 0.5–1.4)
DIFFERENTIAL METHOD: ABNORMAL
EOSINOPHIL # BLD AUTO: 0.6 K/UL (ref 0–0.5)
EOSINOPHIL NFR BLD: 5.4 % (ref 0–8)
ERYTHROCYTE [DISTWIDTH] IN BLOOD BY AUTOMATED COUNT: 16.7 % (ref 11.5–14.5)
EST. GFR  (NO RACE VARIABLE): >60 ML/MIN/1.73 M^2
GLUCOSE SERPL-MCNC: 143 MG/DL (ref 70–110)
GRAM STN SPEC: NORMAL
GRAM STN SPEC: NORMAL
HCT VFR BLD AUTO: 27.4 % (ref 37–48.5)
HGB BLD-MCNC: 8.9 G/DL (ref 12–16)
IMM GRANULOCYTES # BLD AUTO: 0.04 K/UL (ref 0–0.04)
IMM GRANULOCYTES NFR BLD AUTO: 0.4 % (ref 0–0.5)
LYMPHOCYTES # BLD AUTO: 5 K/UL (ref 1–4.8)
LYMPHOCYTES NFR BLD: 49.5 % (ref 18–48)
MCH RBC QN AUTO: 24.8 PG (ref 27–31)
MCHC RBC AUTO-ENTMCNC: 32.5 G/DL (ref 32–36)
MCV RBC AUTO: 76 FL (ref 82–98)
MONOCYTES # BLD AUTO: 1.2 K/UL (ref 0.3–1)
MONOCYTES NFR BLD: 11.8 % (ref 4–15)
NEUTROPHILS # BLD AUTO: 3.2 K/UL (ref 1.8–7.7)
NEUTROPHILS NFR BLD: 31.8 % (ref 38–73)
NRBC BLD-RTO: 0 /100 WBC
PLATELET # BLD AUTO: 751 K/UL (ref 150–450)
PMV BLD AUTO: 8.9 FL (ref 9.2–12.9)
POCT GLUCOSE: 147 MG/DL (ref 70–110)
POCT GLUCOSE: 166 MG/DL (ref 70–110)
POTASSIUM SERPL-SCNC: 5.2 MMOL/L (ref 3.5–5.1)
RBC # BLD AUTO: 3.59 M/UL (ref 4–5.4)
SODIUM SERPL-SCNC: 132 MMOL/L (ref 136–145)
WBC # BLD AUTO: 10.17 K/UL (ref 3.9–12.7)

## 2023-01-10 PROCEDURE — 99232 PR SUBSEQUENT HOSPITAL CARE,LEVL II: ICD-10-PCS | Mod: ,,, | Performed by: PODIATRIST

## 2023-01-10 PROCEDURE — 11000001 HC ACUTE MED/SURG PRIVATE ROOM

## 2023-01-10 PROCEDURE — 99232 SBSQ HOSP IP/OBS MODERATE 35: CPT | Mod: ,,, | Performed by: PODIATRIST

## 2023-01-10 PROCEDURE — A4216 STERILE WATER/SALINE, 10 ML: HCPCS | Performed by: HOSPITALIST

## 2023-01-10 PROCEDURE — A9585 GADOBUTROL INJECTION: HCPCS | Performed by: INTERNAL MEDICINE

## 2023-01-10 PROCEDURE — S4991 NICOTINE PATCH NONLEGEND: HCPCS | Performed by: INTERNAL MEDICINE

## 2023-01-10 PROCEDURE — 25500020 PHARM REV CODE 255: Performed by: INTERNAL MEDICINE

## 2023-01-10 PROCEDURE — 25000003 PHARM REV CODE 250: Performed by: HOSPITALIST

## 2023-01-10 PROCEDURE — 85025 COMPLETE CBC W/AUTO DIFF WBC: CPT | Performed by: HOSPITALIST

## 2023-01-10 PROCEDURE — 36415 COLL VENOUS BLD VENIPUNCTURE: CPT | Performed by: HOSPITALIST

## 2023-01-10 PROCEDURE — 27000207 HC ISOLATION

## 2023-01-10 PROCEDURE — 80048 BASIC METABOLIC PNL TOTAL CA: CPT | Performed by: HOSPITALIST

## 2023-01-10 PROCEDURE — 25000003 PHARM REV CODE 250: Performed by: INTERNAL MEDICINE

## 2023-01-10 PROCEDURE — 63600175 PHARM REV CODE 636 W HCPCS: Performed by: HOSPITALIST

## 2023-01-10 RX ORDER — IBUPROFEN 200 MG
1 TABLET ORAL DAILY
Status: DISCONTINUED | OUTPATIENT
Start: 2023-01-10 | End: 2023-01-17 | Stop reason: HOSPADM

## 2023-01-10 RX ORDER — GADOBUTROL 604.72 MG/ML
10 INJECTION INTRAVENOUS
Status: COMPLETED | OUTPATIENT
Start: 2023-01-10 | End: 2023-01-10

## 2023-01-10 RX ORDER — LIDOCAINE 50 MG/G
1 PATCH TOPICAL
Status: DISCONTINUED | OUTPATIENT
Start: 2023-01-10 | End: 2023-01-17 | Stop reason: HOSPADM

## 2023-01-10 RX ADMIN — OXYCODONE AND ACETAMINOPHEN 1 TABLET: 10; 325 TABLET ORAL at 04:01

## 2023-01-10 RX ADMIN — CEFEPIME HYDROCHLORIDE 2 G: 2 INJECTION, SOLUTION INTRAVENOUS at 06:01

## 2023-01-10 RX ADMIN — SENNOSIDES AND DOCUSATE SODIUM 1 TABLET: 50; 8.6 TABLET ORAL at 09:01

## 2023-01-10 RX ADMIN — GADOBUTROL 10 ML: 604.72 INJECTION INTRAVENOUS at 04:01

## 2023-01-10 RX ADMIN — Medication 10 ML: at 02:01

## 2023-01-10 RX ADMIN — SENNOSIDES AND DOCUSATE SODIUM 1 TABLET: 50; 8.6 TABLET ORAL at 08:01

## 2023-01-10 RX ADMIN — PRAVASTATIN SODIUM 40 MG: 40 TABLET ORAL at 08:01

## 2023-01-10 RX ADMIN — INSULIN DETEMIR 10 UNITS: 100 INJECTION, SOLUTION SUBCUTANEOUS at 08:01

## 2023-01-10 RX ADMIN — RISPERIDONE 3 MG: 1 TABLET ORAL at 09:01

## 2023-01-10 RX ADMIN — LIDOCAINE 1 PATCH: 50 PATCH CUTANEOUS at 11:01

## 2023-01-10 RX ADMIN — BUMETANIDE 1 MG: 1 TABLET ORAL at 09:01

## 2023-01-10 RX ADMIN — GABAPENTIN 600 MG: 300 CAPSULE ORAL at 08:01

## 2023-01-10 RX ADMIN — CEFEPIME HYDROCHLORIDE 2 G: 2 INJECTION, SOLUTION INTRAVENOUS at 08:01

## 2023-01-10 RX ADMIN — APIXABAN 5 MG: 5 TABLET, FILM COATED ORAL at 08:01

## 2023-01-10 RX ADMIN — Medication 10 ML: at 06:01

## 2023-01-10 RX ADMIN — ONDANSETRON 4 MG: 2 INJECTION INTRAMUSCULAR; INTRAVENOUS at 09:01

## 2023-01-10 RX ADMIN — DIVALPROEX SODIUM 1500 MG: 250 TABLET, DELAYED RELEASE ORAL at 08:01

## 2023-01-10 RX ADMIN — INSULIN ASPART 3 UNITS: 100 INJECTION, SOLUTION INTRAVENOUS; SUBCUTANEOUS at 08:01

## 2023-01-10 RX ADMIN — Medication 1 PATCH: at 08:01

## 2023-01-10 RX ADMIN — INSULIN ASPART 3 UNITS: 100 INJECTION, SOLUTION INTRAVENOUS; SUBCUTANEOUS at 05:01

## 2023-01-10 RX ADMIN — RISPERIDONE 3 MG: 1 TABLET ORAL at 08:01

## 2023-01-10 RX ADMIN — FERROUS SULFATE TAB 325 MG (65 MG ELEMENTAL FE) 1 EACH: 325 (65 FE) TAB at 09:01

## 2023-01-10 RX ADMIN — APIXABAN 5 MG: 5 TABLET, FILM COATED ORAL at 09:01

## 2023-01-10 RX ADMIN — INSULIN ASPART 1 UNITS: 100 INJECTION, SOLUTION INTRAVENOUS; SUBCUTANEOUS at 08:01

## 2023-01-10 RX ADMIN — VANCOMYCIN HYDROCHLORIDE 2000 MG: 1 INJECTION, POWDER, LYOPHILIZED, FOR SOLUTION INTRAVENOUS at 01:01

## 2023-01-10 RX ADMIN — Medication 10 ML: at 10:01

## 2023-01-10 NOTE — NURSING
Received report from DANK Wetzel. Patient lying in bed resting, NAD noted. Safety Precautions maintained, Will Monitor.

## 2023-01-10 NOTE — PLAN OF CARE
Problem: Skin Injury Risk Increased  Goal: Skin Health and Integrity  Outcome: Ongoing, Progressing     Problem: Adult Inpatient Plan of Care  Goal: Plan of Care Review  Outcome: Ongoing, Progressing  Goal: Patient-Specific Goal (Individualized)  Outcome: Ongoing, Progressing  Goal: Absence of Hospital-Acquired Illness or Injury  Outcome: Ongoing, Progressing  Goal: Optimal Comfort and Wellbeing  Outcome: Ongoing, Progressing  Goal: Readiness for Transition of Care  Outcome: Ongoing, Progressing     Problem: Infection  Goal: Absence of Infection Signs and Symptoms  Outcome: Ongoing, Progressing     Problem: Diabetes Comorbidity  Goal: Blood Glucose Level Within Targeted Range  Outcome: Ongoing, Progressing     Problem: Impaired Wound Healing  Goal: Optimal Wound Healing  Outcome: Ongoing, Progressing

## 2023-01-10 NOTE — NURSING
2000: Patient arrived to unit via stretcher per transportation in stable condition. AAO x4. Respirations even and unlabored. RA. No s/s of distress noted.  at BS. Arti in place. Oriented patient to room and call bell. Bed locked in lowest position. Side rails up x3. Call bell within reach. Will continue to monitor.

## 2023-01-10 NOTE — HOSPITAL COURSE
Patient admitted for L diabetic foot ulcer.   Diabetic ulcer of left midfoot associated with type 2 diabetes mellitus, limited to breakdown of skin  Admitted with worsening L plantar DM foot wound. S/p I&D in 12/2022 with bone cultures no growth and treated for skin/soft tissue infection with bactrim x10 days. Worsening with enlarging wound and cellulitis involving the foot and the leg.   - On vanc, ctx discotninued  - Podiatry consulted   Podiatry consulted. Blood cultures are NG  Wound with Staph. ID consulted for recs for discharge     MRSA and Enteroccocus on wound cultures. Follow podiatry/ID recs.  -continue antibiotics and wound care. Vanc for approx 3-4 weeks and will re-evaluate clinically (could be extended to 6 weeks if path confirms osteo)     Estimated end date of IV antibiotics: 2/5/23     Charcot's joint of left foot  This contributes to wound formation         Cellulitis of left foot  See DM foot wound        Iron deficiency anemia  Iron deficient by labs 12/2022. Not appropriate for IV iron in setting of active infection.   - started PO iron         Hyponatremia  Na slightly low on admit. May be attributed to antipsychotics. Not symptomatic.   - improced        Class 2 severe obesity with serious comorbidity and body mass index (BMI) of 39.0 to 39.9 in adult  Body mass index is 39.71 kg/m². Morbid obesity complicates all aspects of disease management from diagnostic modalities to treatment. Weight loss encouraged and health benefits explained to patient.                   Thrombocytosis  Plts elevated most likely due to iron deficiency. Treat iron deficiency.         Cellulitis of left lower extremity  See DM foot wound        Bipolar 1 disorder  No signs of acute bibi or depression. She is NOT taking carbamazepine.   - continue risperdal- takes 3mg BID  - continue depakote- takes 1500mg QHS        History of DVT (deep vein thrombosis)  History of DVT and PE. Most recent US shows no DVT.   -  continue home eliquis         Tobacco abuse  Patient was counseled on smoking cessation for 4 minutes. Encouraged cessation.         Hyperlipidemia  Continue home pravastatin         COPD (chronic obstructive pulmonary disease)  No PFTs to review. No signs of acute exacerbation. Stable on room air.         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 On gabapentin but not filled since 11/2022  - continue gabapentin

## 2023-01-10 NOTE — PROGRESS NOTES
[facility] Medicine  Progress Note    Patient Name: Audrey Natarajan  MRN: [id number]  Patient Class: IP- Inpatient   Admission Date: 1/9/2023  Length of Stay: 1 days  Attending Physician: Micha Grossman MD  Primary Care Provider: Donaldo Pena MD        Subjective:     Principal Problem:Diabetic ulcer of left midfoot associated with type 2 diabetes mellitus, limited to breakdown of skin        HPI:  Ms Audery Natarajan is a 52 y.o. woman with DM who presents with worsening L leg swelling.     She was recently admitted 12/22-27/2022 with worsening L plantar foot ulceration. S/p debridement by Podiatry. Bone cultures no growth. Wound culture with MRSA. Seen by ID who recommended bactrim.    She took the bactrim as prescribed with last dose taken 1/5/2023. She noticed improvement in swelling and erythema on bactrim but then noticed worsening swelling and erythema to the L foot and leg. She followed up in wound care on 12/29 and then again on 1/6. Per notes on 1/6, wound had doubled in size.     She has noticed decreased appetite, increased thirst, increased urination, and high glucoses. She does smoke cigarettes.         Overview/Hospital Course:  Patient admitted for L diabetic foot ulcer. Podiatry consulted       Interval History: No new complaints     Review of Systems   Constitutional:  Positive for activity change. Negative for chills, diaphoresis, fatigue and fever.   HENT:  Negative for congestion and dental problem.    Eyes:  Negative for discharge and itching.   Respiratory:  Negative for apnea.    Cardiovascular:  Negative for chest pain.   Gastrointestinal:  Negative for abdominal pain.   Genitourinary:  Negative for difficulty urinating and dyspareunia.   Skin:  Negative for color change and pallor.   Neurological:  Negative for facial asymmetry.   Psychiatric/Behavioral:  Negative for agitation and behavioral problems.    Objective:     Vital Signs (Most Recent):  Temp:  97.8 °F (36.6 °C) (01/10/23 0658)  Pulse: 82 (01/10/23 0658)  Resp: 20 (01/10/23 0658)  BP: 128/60 (01/10/23 0658)  SpO2: (!) 94 % (01/10/23 0658)   Vital Signs (24h Range):  Temp:  [97.8 °F (36.6 °C)-98.6 °F (37 °C)] 97.8 °F (36.6 °C)  Pulse:  [63-89] 82  Resp:  [16-20] 20  SpO2:  [94 %-98 %] 94 %  BP: (108-150)/(53-95) 128/60     Weight: 107 kg (235 lb 14.3 oz)  Body mass index is 38.07 kg/m².    Intake/Output Summary (Last 24 hours) at 1/10/2023 0921  Last data filed at 1/10/2023 0632  Gross per 24 hour   Intake 1127.02 ml   Output 800 ml   Net 327.02 ml      Physical Exam  Vitals and nursing note reviewed.   Constitutional:       General: She is not in acute distress.     Appearance: Normal appearance. She is obese. She is not ill-appearing or toxic-appearing.   HENT:      Head: Normocephalic and atraumatic.      Nose: Nose normal.      Mouth/Throat:      Pharynx: Oropharynx is clear.   Eyes:      Conjunctiva/sclera: Conjunctivae normal.   Cardiovascular:      Rate and Rhythm: Normal rate and regular rhythm.   Pulmonary:      Effort: Pulmonary effort is normal. No respiratory distress.   Skin:     General: Skin is dry.   Neurological:      Mental Status: She is alert and oriented to person, place, and time.   Psychiatric:         Mood and Affect: Mood normal.         Behavior: Behavior normal.       Significant Labs: All pertinent labs within the past 24 hours have been reviewed.  BMP:   Recent Labs   Lab 01/10/23  0531   *   *   K 5.2*   CL 98   CO2 30*   BUN 17   CREATININE 0.7   CALCIUM 8.5*     CBC:   Recent Labs   Lab 01/09/23  1206 01/10/23  0531   WBC 14.57* 10.17   HGB 9.2* 8.9*   HCT 28.9* 27.4*   * 751*       Significant Imaging:       Assessment/Plan:      * Diabetic ulcer of left midfoot associated with type 2 diabetes mellitus, limited to breakdown of skin  Admitted with worsening L plantar DM foot wound. S/p I&D in 12/2022 with bone cultures no growth and treated for skin/soft  tissue infection with bactrim x10 days. Worsening with enlarging wound and cellulitis involving the foot and the leg.   - On vanc/ceftriaxone  - Podiatry consulted     Patient's FSGs are uncontrolled due to hyperglycemia on current medication regimen.  Last A1c reviewed-   Lab Results   Component Value Date    HGBA1C 7.1 (H) 12/22/2022     Most recent fingerstick glucose reviewed-   Recent Labs   Lab 01/09/23  1013 01/09/23  1656 01/09/23  2133 01/10/23  0655   POCTGLUCOSE 190* 106 144* 166*     Current correctional scale  Low  Maintain anti-hyperglycemic dose as follows-   Antihyperglycemics (From admission, onward)    Start     Stop Route Frequency Ordered    01/09/23 2100  insulin detemir U-100 pen 10 Units         -- SubQ Nightly 01/09/23 1620    01/09/23 1730  insulin aspart U-100 pen 3 Units         -- SubQ 3 times daily with meals 01/09/23 1620    01/09/23 1719  insulin aspart U-100 pen 0-5 Units         -- SubQ Before meals & nightly PRN 01/09/23 1620        Hold Oral hypoglycemics while patient is in the hospital.  Podiatry consulted. Blood cultures are NG    Charcot's joint of left foot  This contributes to wound formation       Cellulitis of left foot  See DM foot wound      Iron deficiency anemia  Iron deficient by labs 12/2022. Not appropriate for IV iron in setting of active infection.   - started PO iron       Hyponatremia  Na slightly low on admit. May be attributed to antipsychotics. Not symptomatic.   - BMP in AM      Class 2 severe obesity with serious comorbidity and body mass index (BMI) of 39.0 to 39.9 in adult  Body mass index is 39.71 kg/m². Morbid obesity complicates all aspects of disease management from diagnostic modalities to treatment. Weight loss encouraged and health benefits explained to patient.         Thrombocytosis  Plts elevated most likely due to iron deficiency. Treat iron deficiency.       Cellulitis of left lower extremity  See DM foot wound      Bipolar 1 disorder  No signs  of acute bibi or depression. She is NOT taking carbamazepine.   - continue risperdal- takes 3mg BID  - continue depakote- takes 1500mg QHS      History of DVT (deep vein thrombosis)  History of DVT and PE. Most recent US shows no DVT.   - continue home eliquis       Tobacco abuse  Patient was counseled on smoking cessation for 4 minutes. Encouraged cessation.       Hyperlipidemia  Continue home pravastatin       COPD (chronic obstructive pulmonary disease)  No PFTs to review. No signs of acute exacerbation. Stable on room air.       Essential hypertension  BP is borderline. Hold home metoprolol and lisinopril for now.       Type II diabetes mellitus with neurological manifestations  With neuropathy affecting feet. On gabapentin but not filled since 11/2022  - continue gabapentin       VTE Risk Mitigation (From admission, onward)         Ordered     apixaban tablet 5 mg  2 times daily         01/09/23 1620     IP VTE HIGH RISK PATIENT  Once         01/09/23 1620     Reason for No Pharmacological VTE Prophylaxis  Once        Question:  Reasons:  Answer:  Already adequately anticoagulated on oral Anticoagulants    01/09/23 1620                Discharge Planning   HUMBERTO:      Code Status: Full Code   Is the patient medically ready for discharge?:     Reason for patient still in hospital (select all that apply): Patient unstable  Discharge Plan A: Home                       Micha Lema MD  Department of Hospital Medicine   [facility] Surg

## 2023-01-10 NOTE — PROGRESS NOTES
[facility] Surg  Podiatry  Progress Note    Patient Name: Audrey Natarajan  MRN: [id number]  Admission Date: 1/9/2023  Hospital Length of Stay: 1 days  Attending Physician: Micha Grossman MD  Primary Care Provider: Donaldo Pena MD     Subjective:     History of Present Illness: 51 y/o female PMH DM2, Charcot left foot admitted for wound infection left foot. Patient seen today by Dr. Mata who sent patient to ED for further eval. H/o left foot I&D 12/24/22. Patient reports nausea. Last seen in wound care on 1/6/23.    Interval History:   1/10/2023: Patient seen in hospital room,  at bedside.  Relates pain is primarily to posterior left knee radiating to groin but also has foot and anterior leg pain.  Relates some improvement on admission.  She sates that secondary to knee pain she had not been able to properly offload charcot foot.       Scheduled Meds:   apixaban  5 mg Oral BID    bumetanide  1 mg Oral Daily    ceFEPime (MAXIPIME) IVPB  2 g Intravenous Q8H    divalproex  1,500 mg Oral QHS    ferrous sulfate  1 tablet Oral Daily    gabapentin  600 mg Oral BID    insulin aspart U-100  3 Units Subcutaneous TIDWM    insulin detemir U-100  10 Units Subcutaneous QHS    nicotine  1 patch Transdermal Daily    pravastatin  40 mg Oral QHS    risperiDONE  3 mg Oral BID    senna-docusate 8.6-50 mg  1 tablet Oral BID    sodium chloride 0.9%  10 mL Intravenous Q8H    vancomycin (VANCOCIN) IVPB  2,000 mg Intravenous Q12H     Continuous Infusions:  PRN Meds:acetaminophen, dextrose 10%, dextrose 10%, glucagon (human recombinant), glucose, glucose, insulin aspart U-100, melatonin, methocarbamoL, naloxone, ondansetron, oxyCODONE-acetaminophen, prochlorperazine, traMADoL, Pharmacy to dose Vancomycin consult **AND** vancomycin - pharmacy to dose    Review of Systems   Constitutional:  Positive for activity change. Negative for appetite change, chills, fatigue and fever.   Respiratory:  Negative for cough and  shortness of breath.    Cardiovascular:  Positive for leg swelling. Negative for chest pain.   Gastrointestinal:  Negative for diarrhea, nausea and vomiting.   Musculoskeletal:  Positive for arthralgias and myalgias.   Skin:  Positive for wound.   Neurological:  Positive for numbness. Negative for weakness.        + paresthesia      Objective:     Vital Signs (Most Recent):  Temp: 97.8 °F (36.6 °C) (01/10/23 0658)  Pulse: 82 (01/10/23 0658)  Resp: 20 (01/10/23 0658)  BP: 128/60 (01/10/23 0658)  SpO2: (!) 94 % (01/10/23 0658)   Vital Signs (24h Range):  Temp:  [97.8 °F (36.6 °C)-98.6 °F (37 °C)] 97.8 °F (36.6 °C)  Pulse:  [63-89] 82  Resp:  [16-20] 20  SpO2:  [94 %-98 %] 94 %  BP: (108-150)/(53-95) 128/60     Weight: 107 kg (235 lb 14.3 oz)  Body mass index is 38.07 kg/m².    Physical Exam  Nursing note reviewed.   Constitutional:       General: She is not in acute distress.     Appearance: She is not toxic-appearing or diaphoretic.   Cardiovascular:      Pulses:           Dorsalis pedis pulses are 1+ on the right side and 1+ on the left side.        Posterior tibial pulses are 2+ on the right side and 2+ on the left side.   Pulmonary:      Effort: No respiratory distress.   Musculoskeletal:      Right lower leg: Edema present.      Left lower leg: Edema present.      Right ankle: Swelling present. No lateral malleolus, medial malleolus, AITF ligament, CF ligament or posterior TF ligament tenderness. Decreased range of motion.      Right Achilles Tendon: No defects. Paniagua's test negative.      Left ankle: Swelling present. No lateral malleolus, medial malleolus, AITF ligament, CF ligament, posterior TF ligament or proximal fibula tenderness. Decreased range of motion.      Left Achilles Tendon: No defects. Paniagua's test negative.      Right foot: Swelling and tenderness present.      Left foot: Swelling, Charcot foot and tenderness present.      Comments: There is equinus deformity bilateral with decreased  dorsiflexion at the ankle joint bilateral     Decreased first MPJ range of motion both weightbearing and nonweightbearing, no crepitus observed the first MP joint, + dorsal flag sign. Mild  bunion deformity is observed .     Patient has hammertoes of digits 2-5 bilateral partially reducible without symptom today.     Skin:     General: Skin is warm and dry.      Coloration: Skin is not pale.      Findings: Erythema and wound (see below) present. No laceration.      Nails: There is no clubbing.   Neurological:      Sensory: No sensory deficit.      Motor: No tremor, atrophy or abnormal muscle tone.      Deep Tendon Reflexes: Reflexes are normal and symmetric.      Comments: Paresthesias, and hyperesthesia bilateral feet at toes with no clearly identified trigger or source.     Psychiatric:         Attention and Perception: She is attentive.         Mood and Affect: Mood  anxious. Affect is not inappropriate.         Speech: She is communicative. Speech is not slurred.         Behavior: Behavior is not combative.             01/10/2023    Ulcer location: left plantar midfoot/midline  Signs of infection: local edema and erythema  Drainage: Sero-Sanguinous  Purulence: no  Crepitus/fluctuance: no  Periwound: Reddened, Macerated, Calloused  Base: Mixed Granular/Fibrotic  Depth:  bone  Probe to bone: yes          01/09/2023 12/27/2022      Ulcer location: left plantar midfoot/midline  Signs of infection: local edema and erythema  Drainage: Sero-Sanguinous  Purulence: no  Crepitus/fluctuance: no  Periwound: Reddened, Macerated, Calloused  Base: Mixed Granular/Fibrotic  Depth:  bone  Probe to bone: yes             Laboratory:  A1C:   Recent Labs   Lab 09/08/22  0546 09/20/22  0957 12/22/22  2322   HGBA1C 9.1* 8.6* 7.1*     CBC:   Recent Labs   Lab 01/10/23  0531   WBC 10.17   RBC 3.59*   HGB 8.9*   HCT 27.4*   *   MCV 76*   MCH 24.8*   MCHC 32.5     CMP:   Recent Labs   Lab 01/09/23  1206 01/10/23  0531   GLU  106 143*   CALCIUM 9.6 8.5*   ALBUMIN 3.1*  --    PROT 7.3  --    * 132*   K 4.7 5.2*   CO2 28 30*   CL 94* 98   BUN 14 17   CREATININE 0.7 0.7   ALKPHOS 75  --    ALT 6*  --    AST 14  --    BILITOT 0.2  --      CRP:   Recent Labs   Lab 01/09/23  1206   CRP 38.3*     ESR:   Recent Labs   Lab 01/09/23  1206   SEDRATE 30*     Microbiology Results (last 7 days)       Procedure Component Value Units Date/Time    Blood culture #2 **CANNOT BE ORDERED STAT** [[id number]] Collected: 01/09/23 1214    Order Status: Completed Specimen: Blood from Peripheral, Antecubital, Left Updated: 01/09/23 2112     Blood Culture, Routine No Growth to date    Blood culture #1 **CANNOT BE ORDERED STAT** [[id number]] Collected: 01/09/23 1205    Order Status: Completed Specimen: Blood from Peripheral, Antecubital, Left Updated: 01/09/23 2112     Blood Culture, Routine No Growth to date    Gram stain [[id number]] Collected: 01/09/23 1647    Order Status: Sent Specimen: Wound from Foot, Left Updated: 01/09/23 1700    Aerobic culture [[id number]] Collected: 01/09/23 1647    Order Status: Sent Specimen: Wound from Foot, Left Updated: 01/09/23 1700            Diagnostic Results:  Imaging Results              X-Ray Foot Complete Left (Final result)  Result time 01/09/23 13:28:10      Final result by Gabe Chou MD (01/09/23 13:28:10)                   Impression:      1. Diffuse edema about the foot noting ulceration along the plantar aspect.  No convincing acute displaced fracture or dislocation of the foot.  In comparison to examination 12/22/2022, the edema appears to have decreased.  No convincing new osseous erosive or destructive process.      Electronically signed by: Gabe Chou MD  Date:    01/09/2023  Time:    13:28               Narrative:    EXAMINATION:  XR FOOT COMPLETE 3 VIEW LEFT    CLINICAL HISTORY:  .  Pain in unspecified foot    TECHNIQUE:  AP, lateral and oblique views of the left foot were  performed.    COMPARISON:  12/22/2022    FINDINGS:  Three views left foot.    There is osteopenia.  There is edema about the foot and toes.  There is Charcot appearance of the midfoot.  No radiopaque foreign body.  Allowing for extensive degenerative changes, no convincing acute displaced fracture or dislocation of the foot.  There is ulceration involving the plantar aspect of the foot.                                       US Lower Extremity Veins Left (Final result)  Result time 01/09/23 12:05:16      Final result by Gabe Chou MD (01/09/23 12:05:16)                   Impression:      No evidence of deep venous thrombosis in the left lower extremity.    Edema about the left lower extremity noting left inguinal lymphadenopathy, correlation is advised.      Electronically signed by: Gabe Chou MD  Date:    01/09/2023  Time:    12:05               Narrative:    EXAMINATION:  US LOWER EXTREMITY VEINS LEFT    CLINICAL HISTORY:  Other specified soft tissue disorders    TECHNIQUE:  Duplex and color flow Doppler evaluation and graded compression of the left lower extremity veins was performed.    COMPARISON:  12/22/2022    FINDINGS:  Duplex and color flow Doppler evaluation does not reveal any evidence of acute venous thrombosis in the common femoral, superficial femoral, greater saphenous, popliteal, peroneal, anterior tibial and posterior tibial veins of the left lower extremity.  There is no reflux to suggest valvular incompetence.  There is left lower extremity subcutaneous edema.  There is left inguinal lymphadenopathy.                                     Assessment/Plan:     Active Diagnoses:    Diagnosis Date Noted POA    PRINCIPAL PROBLEM:  Diabetic ulcer of left midfoot associated with type 2 diabetes mellitus, limited to breakdown of skin [E11.621, L97.421] 02/11/2021 Yes    Charcot's joint of left foot [M14.672]  Yes    Cellulitis of left foot [L03.116] 05/06/2022 Yes    Iron deficiency anemia  [D50.9] 05/06/2022 Yes    Hyponatremia [E87.1] 05/04/2022 Yes    Class 2 severe obesity with serious comorbidity and body mass index (BMI) of 39.0 to 39.9 in adult [E66.01, Z68.39] 08/10/2016 Not Applicable    Cellulitis of left lower extremity [L03.116] 07/16/2016 Yes    Thrombocytosis [D75.839] 07/16/2016 Yes    Bipolar 1 disorder [F31.9] 04/13/2015 Yes     Chronic    History of DVT (deep vein thrombosis) [Z86.718] 04/13/2015 Not Applicable     Chronic    Tobacco abuse [Z72.0] 03/06/2014 Yes     Chronic    Hyperlipidemia [E78.5] 02/13/2014 Yes    COPD (chronic obstructive pulmonary disease) [J44.9] 10/11/2013 Yes     Chronic    Type II diabetes mellitus with neurological manifestations [E11.49] 06/06/2013 Yes    Essential hypertension [I10] 06/06/2013 Yes     Chronic      Problems Resolved During this Admission:       Education about the prevention of limb loss.    Discussed wound healing cycle, skin integrity, ways to care for skin.Counseled patient on the effects of smoking,  PVD, and blood glucose on healing. She verbalizes understanding that it can increase the chances of delayed healing and this prolonged exposure leads to infection or progression of infection which subsequently can result in loss of limb.    The wound is cleansed of foreign material as much as possible and the base inspected for bone or abscess. Base is fibrogranular and with probe to bone    Deep wound swab cultures pending    MRI pending    Vascular surgery on board    Abx per ID. On last discharge patient was sent home on 10 day course of abx without clinic follow up.    Personally believe patient would benefit from longer course of IV abx and placement in LTAC or SNF    DSD applied    Will follow    Maira De Los Santos DPM  Podiatry  [facility] - Med Surg

## 2023-01-10 NOTE — ASSESSMENT & PLAN NOTE
Admitted with worsening L plantar DM foot wound. S/p I&D in 12/2022 with bone cultures no growth and treated for skin/soft tissue infection with bactrim x10 days. Worsening with enlarging wound and cellulitis involving the foot and the leg.   - On vanc/ceftriaxone  - Podiatry consulted     Patient's FSGs are uncontrolled due to hyperglycemia on current medication regimen.  Last A1c reviewed-   Lab Results   Component Value Date    HGBA1C 7.1 (H) 12/22/2022     Most recent fingerstick glucose reviewed-   Recent Labs   Lab 01/09/23  1013 01/09/23  1656 01/09/23  2133 01/10/23  0655   POCTGLUCOSE 190* 106 144* 166*     Current correctional scale  Low  Maintain anti-hyperglycemic dose as follows-   Antihyperglycemics (From admission, onward)    Start     Stop Route Frequency Ordered    01/09/23 2100  insulin detemir U-100 pen 10 Units         -- SubQ Nightly 01/09/23 1620    01/09/23 1730  insulin aspart U-100 pen 3 Units         -- SubQ 3 times daily with meals 01/09/23 1620    01/09/23 1719  insulin aspart U-100 pen 0-5 Units         -- SubQ Before meals & nightly PRN 01/09/23 1620        Hold Oral hypoglycemics while patient is in the hospital.  Podiatry consulted. Blood cultures are NG

## 2023-01-10 NOTE — PROGRESS NOTES
Vancomycin consult follow-up:    Patient reviewed, renal function stable, no new levels, continue current therapy; Next levels due: trough due 1/11/2023 at 0000

## 2023-01-10 NOTE — CONSULTS
[facility] - Emergency Dept  Podiatry  Consult Note    Patient Name: Audrey Natarajan  MRN: [id number]  Admission Date: 1/9/2023  Hospital Length of Stay: 0 days  Attending Physician: Keyona Darden MD  Primary Care Provider: Donaldo Pena MD     Inpatient consult to Podiatry  Consult performed by: Halle Gill DPM  Consult ordered by: Keyona Darden MD      Subjective:     History of Present Illness: 51 y/o female PMH DM2, Charcot left foot admitted for wound infection left foot. Patient seen today by Dr. Mata who sent patient to ED for further eval. H/o left foot I&D 12/24/22. Patient reports nausea. Last seen in wound care on 1/6/23.      Scheduled Meds:   apixaban  5 mg Oral BID    [START ON 1/10/2023] bumetanide  1 mg Oral Daily    ceFEPime (MAXIPIME) IVPB  2 g Intravenous Q8H    divalproex  1,500 mg Oral QHS    [START ON 1/10/2023] ferrous sulfate  1 tablet Oral Daily    gabapentin  600 mg Oral BID    insulin aspart U-100  3 Units Subcutaneous TIDWM    insulin detemir U-100  10 Units Subcutaneous QHS    pravastatin  40 mg Oral QHS    risperiDONE  3 mg Oral BID    senna-docusate 8.6-50 mg  1 tablet Oral BID    sodium chloride 0.9%  10 mL Intravenous Q8H    [START ON 1/10/2023] vancomycin (VANCOCIN) IVPB  2,000 mg Intravenous Q12H     Continuous Infusions:  PRN Meds:acetaminophen, dextrose 10%, dextrose 10%, glucagon (human recombinant), glucose, glucose, insulin aspart U-100, melatonin, methocarbamoL, naloxone, ondansetron, oxyCODONE-acetaminophen, prochlorperazine, traMADoL, Pharmacy to dose Vancomycin consult **AND** vancomycin - pharmacy to dose    Review of patient's allergies indicates:   Allergen Reactions    Morphine Other (See Comments)     Patient had a psychotic episode after taking Morphine  Agitation, hallucinations    Penicillins Anaphylaxis     Tolerated cephalosporins in the past    Januvia [sitagliptin] Hives    Carbamazepine Other (See Comments)     hyponatremia        Past  "Medical History:   Diagnosis Date    ADHD (attention deficit hyperactivity disorder)     Arthritis     Asthma     Bipolar 1 disorder     Cataract     Cigarette smoker     COPD (chronic obstructive pulmonary disease)     Coronary artery disease     A fib    Depression     bipolar manic depresson    Diabetes mellitus     Diabetic foot ulcers     Diabetic neuropathy     DVT of lower extremity, bilateral 07/2013    bilateral LE DVT. [place] filter placed.     Encounter for blood transfusion     History of blood clots 1. Left Leg=2003; 2.Bilateral Groin=Blood Clots= 5 or 6/ 2013 & 7/2013; 3. LLL of Lung=7/2013;  4. Lt. Lower Leg=7/2013.     Pt. had 1st Blood Clot after [id number], & Last=2013. [place] Filter= Rt.Lateral Neck.    HTN (hypertension) 06/06/2013    Pt states that she does not have hypertension    Hypercholesteremia     Irregular heartbeat     Neuromuscular disorder     neuropathy feet    Obese     PE (pulmonary embolism) 07/2013    bilat LE DVT.     Restless leg syndrome      Past Surgical History:   Procedure Laterality Date    ABDOMINAL SURGERY  2010    gastric sleeve    BILATERAL OOPHORECTOMY Bilateral 1/12/2015    CHOLECYSTECTOMY      DEBRIDEMENT OF FOOT Bilateral 5/10/2022    Procedure: DEBRIDEMENT, FOOT;  Surgeon: Maira De Los Santos DPM;  Location: [facility] OR;  Service: Podiatry;  Laterality: Bilateral;    Green' s filter Right 7/4/2012    Right Neck & Tunneled Down.    HERNIA REPAIR      "[place] of Hernias Repaires around th Belly Button.", pt. states    INCISION AND DRAINAGE FOOT Left 12/24/2022    Procedure: INCISION AND DRAINAGE, FOOT;  Surgeon: Fahad Razo DPM;  Location: [facility] OR;  Service: Podiatry;  Laterality: Left;    LAPAROSCOPIC CHOLECYSTECTOMY N/A 9/10/2020    Procedure: CHOLECYSTECTOMY, LAPAROSCOPIC;  Surgeon: Montrell Gutierrez MD;  Location: [facility] OR;  Service: General;  Laterality: N/A;  RN PREOP 9/9----COVID Negative  9/9    OVARIAN CYST REMOVAL  3/13/2014    AL REMOVAL OF " OVARY/TUBE(S)      SPLENECTOMY, TOTAL  2003    TONSILLECTOMY      as a child    TYMPANOSTOMY TUBE PLACEMENT  1976    VEIN SURGERY      Lt leg       Family History       Problem Relation (Age of Onset)    Cataracts Father    Diabetes Father, Paternal Grandfather    Heart disease Father, Paternal Grandfather    Hypertension Father    No Known Problems Mother, Sister, Brother, Maternal Aunt, Maternal Uncle, Paternal Aunt, Paternal Uncle, Maternal Grandfather    Ovarian cancer Maternal Grandmother, Paternal Grandmother          Tobacco Use    Smoking status: Every Day     Packs/day: 1.00     Years: 37.00     Pack years: 37.00     Types: Cigarettes     Last attempt to quit: 2020     Years since quittin.0    Smokeless tobacco: Never    Tobacco comments:     Enrolled in the [employer] on 5/3/14 ([facility] Member ID # [id number]). Ambulatory referral to Smoking Cessation Program   Substance and Sexual Activity    Alcohol use: No     Alcohol/week: 0.0 standard drinks    Drug use: No    Sexual activity: Yes     Partners: Male     Review of Systems   Constitutional:  Positive for activity change.   Respiratory: Negative.     Gastrointestinal: Negative.    Genitourinary: Negative.    Musculoskeletal:  Positive for arthralgias.   Skin:  Positive for wound.   Psychiatric/Behavioral: Negative.     Objective:     Vital Signs (Most Recent):  Temp: 98.6 °F (37 °C) (23 1011)  Pulse: 69 (23 1430)  Resp: 20 (23 1844)  BP: (!) 108/53 (23 1433)  SpO2: 95 % (23 1430)   Vital Signs (24h Range):  Temp:  [98.6 °F (37 °C)] 98.6 °F (37 °C)  Pulse:  [63-71] 69  Resp:  [17-20] 20  SpO2:  [95 %-98 %] 95 %  BP: (108-150)/(53-72) 108/53     Weight: 111.6 kg (246 lb)  Body mass index is 39.71 kg/m².    Foot Exam    Left Foot/Ankle      Inspection and Palpation  Skin Exam: ulcer;     Neurovascular  Dorsalis pedis: 2+  Posterior tibial: 2+  Saphenous nerve sensation: diminished  Tibial nerve sensation:  diminished  Superficial peroneal nerve sensation: diminished  Deep peroneal nerve sensation: diminished  Sural nerve sensation: diminished      Left plantar foot ulceration no seamus purulent drainage noted.     Laboratory:  CBC:   Recent Labs   Lab 01/09/23  1206   WBC 14.57*   RBC 3.75*   HGB 9.2*   HCT 28.9*   *   MCV 77*   MCH 24.5*   MCHC 31.8*     CMP:   Recent Labs   Lab 01/09/23  1206      CALCIUM 9.6   ALBUMIN 3.1*   PROT 7.3   *   K 4.7   CO2 28   CL 94*   BUN 14   CREATININE 0.7   ALKPHOS 75   ALT 6*   AST 14   BILITOT 0.2       Diagnostic Results:  Xray: X-Ray Foot Complete Left  Order: [id number]  Status: Final result     Visible to patient: Yes (not seen)     Next appt: 01/12/2023 at 09:00 AM in Wound Care (Jennifer Zambrano MD)     Dx: Foot pain     0 Result Notes  Details    Reading Physician Reading Date Result Priority   Gabe Chou MD  [phone]  [phone] 1/9/2023 STAT     Narrative & Impression  EXAMINATION:  XR FOOT COMPLETE 3 VIEW LEFT     CLINICAL HISTORY:  .  Pain in unspecified foot     TECHNIQUE:  AP, lateral and oblique views of the left foot were performed.     COMPARISON:  12/22/2022     FINDINGS:  Three views left foot.     There is osteopenia.  There is edema about the foot and toes.  There is Charcot appearance of the midfoot.  No radiopaque foreign body.  Allowing for extensive degenerative changes, no convincing acute displaced fracture or dislocation of the foot.  There is ulceration involving the plantar aspect of the foot.     Impression:     1. Diffuse edema about the foot noting ulceration along the plantar aspect.  No convincing acute displaced fracture or dislocation of the foot.  In comparison to examination 12/22/2022, the edema appears to have decreased.  No convincing new osseous erosive or destructive process.           MRI : ordered     Clinical Findings:  Left plantar foot ulceration no seamus purulent drainage noted.     Assessment/Plan:      Active Diagnoses:    Diagnosis Date Noted POA    PRINCIPAL PROBLEM:  Diabetic ulcer of left midfoot associated with type 2 diabetes mellitus, limited to breakdown of skin [E11.621, L97.421] 02/11/2021 Yes    Charcot's joint of left foot [M14.672]  Yes    Cellulitis of left foot [L03.116] 05/06/2022 Yes    Iron deficiency anemia [D50.9] 05/06/2022 Yes    Hyponatremia [E87.1] 05/04/2022 Yes    Class 2 severe obesity with serious comorbidity and body mass index (BMI) of 39.0 to 39.9 in adult [E66.01, Z68.39] 08/10/2016 Not Applicable    Cellulitis of left lower extremity [L03.116] 07/16/2016 Yes    Thrombocytosis [D75.839] 07/16/2016 Yes    Bipolar 1 disorder [F31.9] 04/13/2015 Yes     Chronic    History of DVT (deep vein thrombosis) [Z86.718] 04/13/2015 Not Applicable     Chronic    Tobacco abuse [Z72.0] 03/06/2014 Yes     Chronic    Hyperlipidemia [E78.5] 02/13/2014 Yes    COPD (chronic obstructive pulmonary disease) [J44.9] 10/11/2013 Yes     Chronic    Type II diabetes mellitus with neurological manifestations [E11.49] 06/06/2013 Yes    Essential hypertension [I10] 06/06/2013 Yes     Chronic      Problems Resolved During this Admission:       MRI ordered r/o abscess eval OM    Culture obtained left foot     Recommend SNF vs. LTAC for wound care, IV abx    DSD applied         Thank you for your consult. I will follow-up with patient. Please contact us if you have any additional questions.    Halle Gill DPM  Podiatry  [facility] [place] - Emergency Dept

## 2023-01-10 NOTE — PLAN OF CARE
[facility] - [facility] Surg  Initial Discharge Assessment       Primary Care Provider: Donaldo Pena MD    Admission Diagnosis: Foot pain [M79.673]  Leg swelling [M79.89]  Cellulitis of left lower extremity [L03.116]  Chest pain [R07.9]  Other acute osteomyelitis, unspecified ankle and foot [M86.179]  Diabetic ulcer of left midfoot associated with type 2 diabetes mellitus, limited to breakdown of skin [E11.621, L97.421]    Admission Date: 1/9/2023  Expected Discharge Date:     Discharge Barriers Identified: None    Payor: MEDICAID / Plan: [place] / Product Type: Managed Medicaid /     Extended Emergency Contact Information  Primary Emergency Contact: AntAnitra   [facility]  Home Phone: [phone]  Relation: Sister  Secondary Emergency Contact: Tabitha Man   [facility]  Home Phone: [phone]  Relation: Other  Mother: Catia Weathers  Mobile Phone: [phone]    Discharge Plan A: Home  Discharge Plan B: Home      [id number] - [place] - [address] [facility] [address] [facility] [address] [place] [address]  Phone: [phone] Fax: [phone]    [facility] [address]  [place] - [address] [address]  Phone: [phone] Fax: [phone]    [id number] - [place], [place] - [address] AT [facility]  [address]  [address]  [facility] [address]  Phone: [phone] Fax: [phone]    [facility] [address] - [place] - [address] [facility]  [address] [facility] [address]  [place] [address]  Phone: [phone] Fax: [phone]    [facility] #[id number] - [place] - [address] AT [facility] [address]  [address] [place] [address]  Phone: [phone] Fax: [phone]    [facility] #[id number] - [place] - [address]  [address]  [place] [address]  Phone: [phone] Fax: [phone]    [facility] [address] [facility]  [address]   [place] [address]  Phone: [phone] Fax: [phone]      Initial Assessment (most recent)       Adult Discharge Assessment - 01/10/23 0916          Discharge Assessment    Assessment Type Discharge Planning Assessment     Confirmed/corrected address, phone number and insurance Yes     Confirmed Demographics Correct on Facesheet     Source of Information health record     When was your last doctors appointment? 01/04/23     Does patient/caregiver understand observation status No     Communicated HUMBERTO with patient/caregiver Date not available/Unable to determine     Reason For Admission Diabetic foot ulcer     People in Home parent(s)     Do you expect to return to your current living situation? Yes     Do you have help at home or someone to help you manage your care at home? Yes     Who are your caregiver(s) and their phone number(s)? Anitra Cain (Sister)   [phone] (Home Phone)     Prior to hospitilization cognitive status: Alert/Oriented     Current cognitive status: Alert/Oriented     Equipment Currently Used at Home wheelchair;nebulizer   knee scooter    Readmission within 30 days? Yes   [facility]    Patient currently being followed by outpatient case management? No     Do you currently have service(s) that help you manage your care at home? No     Do you take prescription medications? Yes     Do you have prescription coverage? Yes     Coverage [employer] Medicaid     Do you have any problems affording any of your prescribed medications? No     Is the patient taking medications as prescribed? yes     Who is going to help you get home at discharge? Anitra Cain (Sister)   [phone] (Home Phone)     How do you get to doctors appointments? car, drives self;family or friend will provide     Are you on dialysis? No     Do you take coumadin? No     Discharge  Plan A Home     Discharge Plan B Home     DME Needed Upon Discharge  none     Discharge Barriers Identified None

## 2023-01-10 NOTE — SUBJECTIVE & OBJECTIVE
Interval History: No new complaints     Review of Systems   Constitutional:  Positive for activity change. Negative for chills, diaphoresis, fatigue and fever.   HENT:  Negative for congestion and dental problem.    Eyes:  Negative for discharge and itching.   Respiratory:  Negative for apnea.    Cardiovascular:  Negative for chest pain.   Gastrointestinal:  Negative for abdominal pain.   Genitourinary:  Negative for difficulty urinating and dyspareunia.   Skin:  Negative for color change and pallor.   Neurological:  Negative for facial asymmetry.   Psychiatric/Behavioral:  Negative for agitation and behavioral problems.    Objective:     Vital Signs (Most Recent):  Temp: 97.8 °F (36.6 °C) (01/10/23 0658)  Pulse: 82 (01/10/23 0658)  Resp: 20 (01/10/23 0658)  BP: 128/60 (01/10/23 0658)  SpO2: (!) 94 % (01/10/23 0658)   Vital Signs (24h Range):  Temp:  [97.8 °F (36.6 °C)-98.6 °F (37 °C)] 97.8 °F (36.6 °C)  Pulse:  [63-89] 82  Resp:  [16-20] 20  SpO2:  [94 %-98 %] 94 %  BP: (108-150)/(53-95) 128/60     Weight: 107 kg (235 lb 14.3 oz)  Body mass index is 38.07 kg/m².    Intake/Output Summary (Last 24 hours) at 1/10/2023 0921  Last data filed at 1/10/2023 0632  Gross per 24 hour   Intake 1127.02 ml   Output 800 ml   Net 327.02 ml      Physical Exam  Vitals and nursing note reviewed.   Constitutional:       General: She is not in acute distress.     Appearance: Normal appearance. She is obese. She is not ill-appearing or toxic-appearing.   HENT:      Head: Normocephalic and atraumatic.      Nose: Nose normal.      Mouth/Throat:      Pharynx: Oropharynx is clear.   Eyes:      Conjunctiva/sclera: Conjunctivae normal.   Cardiovascular:      Rate and Rhythm: Normal rate and regular rhythm.   Pulmonary:      Effort: Pulmonary effort is normal. No respiratory distress.   Skin:     General: Skin is dry.   Neurological:      Mental Status: She is alert and oriented to person, place, and time.   Psychiatric:         Mood and  Affect: Mood normal.         Behavior: Behavior normal.       Significant Labs: All pertinent labs within the past 24 hours have been reviewed.  BMP:   Recent Labs   Lab 01/10/23  0531   *   *   K 5.2*   CL 98   CO2 30*   BUN 17   CREATININE 0.7   CALCIUM 8.5*     CBC:   Recent Labs   Lab 01/09/23  1206 01/10/23  0531   WBC 14.57* 10.17   HGB 9.2* 8.9*   HCT 28.9* 27.4*   * 751*       Significant Imaging:

## 2023-01-10 NOTE — PLAN OF CARE
Problem: Skin Injury Risk Increased  Goal: Skin Health and Integrity  Outcome: Ongoing, Progressing     Problem: Adult Inpatient Plan of Care  Goal: Plan of Care Review  Outcome: Ongoing, Progressing  Goal: Patient-Specific Goal (Individualized)  Outcome: Ongoing, Progressing  Goal: Absence of Hospital-Acquired Illness or Injury  Outcome: Ongoing, Progressing  Goal: Optimal Comfort and Wellbeing  Outcome: Ongoing, Progressing     Problem: Infection  Goal: Absence of Infection Signs and Symptoms  Outcome: Ongoing, Progressing     Problem: Diabetes Comorbidity  Goal: Blood Glucose Level Within Targeted Range  Outcome: Ongoing, Progressing     Problem: Impaired Wound Healing  Goal: Optimal Wound Healing  Outcome: Ongoing, Progressing     POC reviewed with patient. All questions and concerns addressed. Fall/safety precautions implemented and maintained. Purewick care performed. Blood glucose monitored. IV abx administered. Pain management provided. No acute events noted this shift. Please see flowsheet for full assessment and vitals. Bed locked in lowest position. Side rails up x2. Call bell within reach. Will continue to monitor.

## 2023-01-11 LAB
ANION GAP SERPL CALC-SCNC: 10 MMOL/L (ref 8–16)
BASOPHILS # BLD AUTO: 0.11 K/UL (ref 0–0.2)
BASOPHILS NFR BLD: 1 % (ref 0–1.9)
BUN SERPL-MCNC: 14 MG/DL (ref 6–20)
CALCIUM SERPL-MCNC: 9.3 MG/DL (ref 8.7–10.5)
CHLORIDE SERPL-SCNC: 100 MMOL/L (ref 95–110)
CO2 SERPL-SCNC: 26 MMOL/L (ref 23–29)
CREAT SERPL-MCNC: 0.7 MG/DL (ref 0.5–1.4)
DIFFERENTIAL METHOD: ABNORMAL
EOSINOPHIL # BLD AUTO: 0.6 K/UL (ref 0–0.5)
EOSINOPHIL NFR BLD: 6 % (ref 0–8)
ERYTHROCYTE [DISTWIDTH] IN BLOOD BY AUTOMATED COUNT: 16.8 % (ref 11.5–14.5)
EST. GFR  (NO RACE VARIABLE): >60 ML/MIN/1.73 M^2
GLUCOSE SERPL-MCNC: 211 MG/DL (ref 70–110)
HCT VFR BLD AUTO: 32.1 % (ref 37–48.5)
HGB BLD-MCNC: 10.3 G/DL (ref 12–16)
IMM GRANULOCYTES # BLD AUTO: 0.07 K/UL (ref 0–0.04)
IMM GRANULOCYTES NFR BLD AUTO: 0.7 % (ref 0–0.5)
LYMPHOCYTES # BLD AUTO: 4.5 K/UL (ref 1–4.8)
LYMPHOCYTES NFR BLD: 42.8 % (ref 18–48)
MCH RBC QN AUTO: 25.1 PG (ref 27–31)
MCHC RBC AUTO-ENTMCNC: 32.1 G/DL (ref 32–36)
MCV RBC AUTO: 78 FL (ref 82–98)
MONOCYTES # BLD AUTO: 1.1 K/UL (ref 0.3–1)
MONOCYTES NFR BLD: 10.2 % (ref 4–15)
NEUTROPHILS # BLD AUTO: 4.1 K/UL (ref 1.8–7.7)
NEUTROPHILS NFR BLD: 39.3 % (ref 38–73)
NRBC BLD-RTO: 0 /100 WBC
PLATELET # BLD AUTO: 832 K/UL (ref 150–450)
PMV BLD AUTO: 9 FL (ref 9.2–12.9)
POCT GLUCOSE: 167 MG/DL (ref 70–110)
POCT GLUCOSE: 200 MG/DL (ref 70–110)
POCT GLUCOSE: 216 MG/DL (ref 70–110)
POCT GLUCOSE: 330 MG/DL (ref 70–110)
POTASSIUM SERPL-SCNC: 5 MMOL/L (ref 3.5–5.1)
RBC # BLD AUTO: 4.1 M/UL (ref 4–5.4)
SODIUM SERPL-SCNC: 136 MMOL/L (ref 136–145)
VANCOMYCIN TROUGH SERPL-MCNC: 15.3 UG/ML (ref 10–22)
WBC # BLD AUTO: 10.52 K/UL (ref 3.9–12.7)

## 2023-01-11 PROCEDURE — 27000207 HC ISOLATION

## 2023-01-11 PROCEDURE — 25000003 PHARM REV CODE 250: Performed by: INTERNAL MEDICINE

## 2023-01-11 PROCEDURE — 85025 COMPLETE CBC W/AUTO DIFF WBC: CPT | Performed by: HOSPITALIST

## 2023-01-11 PROCEDURE — 80048 BASIC METABOLIC PNL TOTAL CA: CPT | Performed by: HOSPITALIST

## 2023-01-11 PROCEDURE — 99222 PR INITIAL HOSPITAL CARE,LEVL II: ICD-10-PCS | Mod: ,,, | Performed by: INTERNAL MEDICINE

## 2023-01-11 PROCEDURE — 63600175 PHARM REV CODE 636 W HCPCS: Performed by: HOSPITALIST

## 2023-01-11 PROCEDURE — A4216 STERILE WATER/SALINE, 10 ML: HCPCS | Performed by: HOSPITALIST

## 2023-01-11 PROCEDURE — S4991 NICOTINE PATCH NONLEGEND: HCPCS | Performed by: INTERNAL MEDICINE

## 2023-01-11 PROCEDURE — 63700000 PHARM REV CODE 250 ALT 637 W/O HCPCS: Performed by: INTERNAL MEDICINE

## 2023-01-11 PROCEDURE — 36415 COLL VENOUS BLD VENIPUNCTURE: CPT | Performed by: INTERNAL MEDICINE

## 2023-01-11 PROCEDURE — C1751 CATH, INF, PER/CENT/MIDLINE: HCPCS

## 2023-01-11 PROCEDURE — A4216 STERILE WATER/SALINE, 10 ML: HCPCS | Performed by: INTERNAL MEDICINE

## 2023-01-11 PROCEDURE — 36569 INSJ PICC 5 YR+ W/O IMAGING: CPT

## 2023-01-11 PROCEDURE — 99222 1ST HOSP IP/OBS MODERATE 55: CPT | Mod: ,,, | Performed by: INTERNAL MEDICINE

## 2023-01-11 PROCEDURE — 11000001 HC ACUTE MED/SURG PRIVATE ROOM

## 2023-01-11 PROCEDURE — 25000003 PHARM REV CODE 250: Performed by: HOSPITALIST

## 2023-01-11 PROCEDURE — 80202 ASSAY OF VANCOMYCIN: CPT | Performed by: INTERNAL MEDICINE

## 2023-01-11 RX ORDER — FLUCONAZOLE 100 MG/1
100 TABLET ORAL ONCE
Status: COMPLETED | OUTPATIENT
Start: 2023-01-11 | End: 2023-01-11

## 2023-01-11 RX ORDER — DOXYLAMINE SUCCINATE 25 MG
TABLET ORAL 2 TIMES DAILY
Status: DISCONTINUED | OUTPATIENT
Start: 2023-01-11 | End: 2023-01-17 | Stop reason: HOSPADM

## 2023-01-11 RX ORDER — SODIUM CHLORIDE 0.9 % (FLUSH) 0.9 %
10 SYRINGE (ML) INJECTION EVERY 6 HOURS
Status: DISCONTINUED | OUTPATIENT
Start: 2023-01-11 | End: 2023-01-17 | Stop reason: HOSPADM

## 2023-01-11 RX ORDER — MUPIROCIN 20 MG/G
OINTMENT TOPICAL 2 TIMES DAILY
Status: DISPENSED | OUTPATIENT
Start: 2023-01-11 | End: 2023-01-16

## 2023-01-11 RX ORDER — SODIUM CHLORIDE 0.9 % (FLUSH) 0.9 %
10 SYRINGE (ML) INJECTION
Status: DISCONTINUED | OUTPATIENT
Start: 2023-01-11 | End: 2023-01-17 | Stop reason: HOSPADM

## 2023-01-11 RX ADMIN — CEFEPIME HYDROCHLORIDE 2 G: 2 INJECTION, SOLUTION INTRAVENOUS at 01:01

## 2023-01-11 RX ADMIN — Medication 10 ML: at 09:01

## 2023-01-11 RX ADMIN — BUMETANIDE 1 MG: 1 TABLET ORAL at 08:01

## 2023-01-11 RX ADMIN — FLUCONAZOLE 100 MG: 100 TABLET ORAL at 08:01

## 2023-01-11 RX ADMIN — GABAPENTIN 600 MG: 300 CAPSULE ORAL at 09:01

## 2023-01-11 RX ADMIN — DIVALPROEX SODIUM 1500 MG: 250 TABLET, DELAYED RELEASE ORAL at 09:01

## 2023-01-11 RX ADMIN — VANCOMYCIN HYDROCHLORIDE 2000 MG: 1 INJECTION, POWDER, LYOPHILIZED, FOR SOLUTION INTRAVENOUS at 06:01

## 2023-01-11 RX ADMIN — SENNOSIDES AND DOCUSATE SODIUM 1 TABLET: 50; 8.6 TABLET ORAL at 08:01

## 2023-01-11 RX ADMIN — VANCOMYCIN HYDROCHLORIDE 2000 MG: 1 INJECTION, POWDER, LYOPHILIZED, FOR SOLUTION INTRAVENOUS at 05:01

## 2023-01-11 RX ADMIN — INSULIN ASPART 3 UNITS: 100 INJECTION, SOLUTION INTRAVENOUS; SUBCUTANEOUS at 01:01

## 2023-01-11 RX ADMIN — ONDANSETRON 4 MG: 2 INJECTION INTRAMUSCULAR; INTRAVENOUS at 07:01

## 2023-01-11 RX ADMIN — CEFEPIME HYDROCHLORIDE 2 G: 2 INJECTION, SOLUTION INTRAVENOUS at 09:01

## 2023-01-11 RX ADMIN — OXYCODONE AND ACETAMINOPHEN 1 TABLET: 10; 325 TABLET ORAL at 06:01

## 2023-01-11 RX ADMIN — APIXABAN 5 MG: 5 TABLET, FILM COATED ORAL at 08:01

## 2023-01-11 RX ADMIN — APIXABAN 5 MG: 5 TABLET, FILM COATED ORAL at 09:01

## 2023-01-11 RX ADMIN — INSULIN ASPART 2 UNITS: 100 INJECTION, SOLUTION INTRAVENOUS; SUBCUTANEOUS at 09:01

## 2023-01-11 RX ADMIN — OXYCODONE AND ACETAMINOPHEN 1 TABLET: 10; 325 TABLET ORAL at 05:01

## 2023-01-11 RX ADMIN — SENNOSIDES AND DOCUSATE SODIUM 1 TABLET: 50; 8.6 TABLET ORAL at 09:01

## 2023-01-11 RX ADMIN — INSULIN ASPART 2 UNITS: 100 INJECTION, SOLUTION INTRAVENOUS; SUBCUTANEOUS at 08:01

## 2023-01-11 RX ADMIN — Medication 10 ML: at 06:01

## 2023-01-11 RX ADMIN — PRAVASTATIN SODIUM 40 MG: 40 TABLET ORAL at 09:01

## 2023-01-11 RX ADMIN — MICONAZOLE NITRATE: 20 CREAM TOPICAL at 09:01

## 2023-01-11 RX ADMIN — MUPIROCIN: 20 OINTMENT TOPICAL at 09:01

## 2023-01-11 RX ADMIN — Medication 10 ML: at 02:01

## 2023-01-11 RX ADMIN — Medication 10 ML: at 05:01

## 2023-01-11 RX ADMIN — INSULIN DETEMIR 10 UNITS: 100 INJECTION, SOLUTION SUBCUTANEOUS at 09:01

## 2023-01-11 RX ADMIN — GABAPENTIN 600 MG: 300 CAPSULE ORAL at 08:01

## 2023-01-11 RX ADMIN — INSULIN ASPART 3 UNITS: 100 INJECTION, SOLUTION INTRAVENOUS; SUBCUTANEOUS at 05:01

## 2023-01-11 RX ADMIN — ONDANSETRON 4 MG: 2 INJECTION INTRAMUSCULAR; INTRAVENOUS at 09:01

## 2023-01-11 RX ADMIN — RISPERIDONE 3 MG: 1 TABLET ORAL at 08:01

## 2023-01-11 RX ADMIN — RISPERIDONE 3 MG: 1 TABLET ORAL at 09:01

## 2023-01-11 RX ADMIN — MICONAZOLE NITRATE: 20 CREAM TOPICAL at 10:01

## 2023-01-11 RX ADMIN — LIDOCAINE 1 PATCH: 50 PATCH CUTANEOUS at 10:01

## 2023-01-11 RX ADMIN — FERROUS SULFATE TAB 325 MG (65 MG ELEMENTAL FE) 1 EACH: 325 (65 FE) TAB at 08:01

## 2023-01-11 RX ADMIN — CEFEPIME HYDROCHLORIDE 2 G: 2 INJECTION, SOLUTION INTRAVENOUS at 05:01

## 2023-01-11 RX ADMIN — INSULIN ASPART 3 UNITS: 100 INJECTION, SOLUTION INTRAVENOUS; SUBCUTANEOUS at 08:01

## 2023-01-11 RX ADMIN — Medication 1 PATCH: at 09:01

## 2023-01-11 NOTE — ASSESSMENT & PLAN NOTE
Admitted with worsening L plantar DM foot wound. S/p I&D in 12/2022 with bone cultures no growth and treated for skin/soft tissue infection with bactrim x10 days. Worsening with enlarging wound and cellulitis involving the foot and the leg.   - On vanc/ceftriaxone  - Podiatry consulted     Patient's FSGs are uncontrolled due to hyperglycemia on current medication regimen.  Last A1c reviewed-   Lab Results   Component Value Date    HGBA1C 7.1 (H) 12/22/2022     Most recent fingerstick glucose reviewed-   Recent Labs   Lab 01/10/23  1730 01/11/23  0709   POCTGLUCOSE 147* 216*     Current correctional scale  Low  Maintain anti-hyperglycemic dose as follows-   Antihyperglycemics (From admission, onward)    Start     Stop Route Frequency Ordered    01/09/23 2100  insulin detemir U-100 pen 10 Units         -- SubQ Nightly 01/09/23 1620    01/09/23 1730  insulin aspart U-100 pen 3 Units         -- SubQ 3 times daily with meals 01/09/23 1620    01/09/23 1719  insulin aspart U-100 pen 0-5 Units         -- SubQ Before meals & nightly PRN 01/09/23 1620        Hold Oral hypoglycemics while patient is in the hospital.  Podiatry consulted. Blood cultures are NG  Wound with Staph. ID consulted for recs for discharge

## 2023-01-11 NOTE — PROCEDURES
"Audrey Natarajan is a 52 y.o. female patient.    Temp: 98.4 °F (36.9 °C) (01/11/23 1057)  Pulse: 95 (01/11/23 1057)  Resp: 18 (01/11/23 1057)  BP: (!) 142/66 (01/11/23 1057)  SpO2: 96 % (01/11/23 1057)  Weight: 107 kg (235 lb 14.3 oz) (01/09/23 2117)  Height: 5' 6" (167.6 cm) (01/09/23 1011)    PICC  Date/Time: 1/11/2023 12:20 PM  Performed by: Mc Giron RN  Consent Done: Yes  Time out: Immediately prior to procedure a time out was called to verify the correct patient, procedure, equipment, support staff and site/side marked as required  Indications: med administration  Anesthesia: local infiltration  Local anesthetic: lidocaine 1% without epinephrine  Anesthetic Total (mL): 1  Preparation: skin prepped with ChloraPrep  Skin prep agent dried: skin prep agent completely dried prior to procedure  Sterile barriers: all five maximum sterile barriers used - cap, mask, sterile gown, sterile gloves, and large sterile sheet  Hand hygiene: hand hygiene performed prior to central venous catheter insertion  Location details: right basilic  Catheter type: double lumen  Catheter size: 5 Fr  Catheter Length: 34cm    Ultrasound guidance: yes  Vessel Caliber: large and patent, compressibility normal  Needle advanced into vessel with real time Ultrasound guidance.  Guidewire confirmed in vessel.  Sterile sheath used.  Number of attempts: 1  Post-procedure: blood return through all ports, chlorhexidine patch and sterile dressing applied  Estimated blood loss (mL): 0          Name Mc Giron   1/11/2023    "

## 2023-01-11 NOTE — CONSULTS
"[facility] - Med Surg  Infectious Disease  Consult Note    Patient Name: Audrey Natarajan  MRN: [id number]  Admission Date: 1/9/2023  Hospital Length of Stay: 2 days  Attending Physician: Micha Grossman MD  Primary Care Provider: Donaldo Pena MD     Isolation Status: Contact    Patient information was obtained from patient and ER records.      Inpatient consult to Infectious Diseases  Consult performed by: Beverly Zavala MD  Consult ordered by: Micha Grossman MD        Assessment/Plan:     * Diabetic ulcer of left midfoot associated with type 2 diabetes mellitus, limited to breakdown of skin  52M with h/o DM with charcot foot, DVT on Eliquis, PAD, ongoing tobacco abuse admitted 1/9 with worsening L leg swelling. Notably she has had several admits over the past several months for L foot infections (12/22 cultures with MDR MRSA treated with bactrim).   Bcx NGTD. MRI from 1/10- Soft tissue ulceration and edema could represent cellulitis.  See above comments.  No focal abscess or acute osteomyelitis is detected. Podiatry sent wound swab, growing staph aureus (susceptibilties pending). Per podiatry "Personally believe patient would benefit from longer course of IV abx and placement in LTAC or SNF". Vascular following. ID consulted for "abx recs for discharge. Thanks"     Recommendations:   - continue vancomycin. Will de-escalate based on pending culture results  - would appreciate bone biopsy sent for path/culture, if possible, to confirm presence of osteomyelitis.   - wound care as per podiatry  - please ensure she has adequate perfusion to heal wound  - tobacco cessation           Thank you for your consult. I will follow-up with patient. Please contact us if you have any additional questions.    Beverly Zavala MD  Infectious Disease  [facility] - Med Surg    Subjective:     Principal Problem: Diabetic ulcer of left midfoot associated with type 2 diabetes mellitus, limited to breakdown of skin    HPI: " "  52M with h/o DM with charcot foot, DVT on Eliquis, PAD, tobacco abuse admitted  1/9 with worsening L leg swelling. Notably she has had several admits over the past several months for L foot infections. Reports wound getting bigger. Denies f/c. Reports compliance with pressure offloading (uses scooter to get around). Prior MRI without OM and was treated with po abx inculding cefadroxil and po bactrim. Says she completed bactrim    Bcx NGTD    MRI from 1/10-   Soft tissue ulceration and edema could represent cellulitis.  See above comments.  No focal abscess or acute osteomyelitis is detected.  Recommend follow-up.    Podiatry sent wound swab  Specimen Information: Foot, Left; Wound    0 Result Notes  Component 2 d ago    Aerobic Bacterial Culture  Abnormal   STAPHYLOCOCCUS AUREUS   Moderate   Susceptibility pending           Per podiatry "Personally believe patient would benefit from longer course of IV abx and placement in LTAC or SNF"    JEYSON with  hemodynamically significant stenosis in the left and DPA. Multifocal mild to moderate grade stenoses is suspected throughout the left MIRACLE and, bilateral posterior tibial and peroneal arteris.    Vascular following     Note pt with PCN allergy, but has tolerated keflex and ancef during prior admissions    Smokes cigarettes    ID consulted for "abx recs for discharge. Thanks"      Past Medical History:   Diagnosis Date    ADHD (attention deficit hyperactivity disorder)     Arthritis     Asthma     Bipolar 1 disorder     Cataract     Cigarette smoker     COPD (chronic obstructive pulmonary disease)     Coronary artery disease     A fib    Depression     bipolar manic depresson    Diabetes mellitus     Diabetic foot ulcers     Diabetic neuropathy     DVT of lower extremity, bilateral 07/2013    bilateral LE DVT. [place] filter placed.     Encounter for blood transfusion     History of blood clots 1. Left Leg=2003; 2.Bilateral Groin=Blood Clots= 5 or 6/ " "2013 & 7/2013; 3. LLL of Lung=7/2013;  4. Lt. Lower Leg=7/2013.     Pt. had 1st Blood Clot after Mstwxjnzvrum=[id number], & Last=2013. [place] Filter= Rt.Lateral Neck.    HTN (hypertension) 06/06/2013    Pt states that she does not have hypertension    Hypercholesteremia     Irregular heartbeat     Neuromuscular disorder     neuropathy feet    Obese     PE (pulmonary embolism) 07/2013    bilat LE DVT.     Restless leg syndrome        Past Surgical History:   Procedure Laterality Date    ABDOMINAL SURGERY  2010    gastric sleeve    BILATERAL OOPHORECTOMY Bilateral 1/12/2015    CHOLECYSTECTOMY      DEBRIDEMENT OF FOOT Bilateral 5/10/2022    Procedure: DEBRIDEMENT, FOOT;  Surgeon: Maira De Los Santos DPM;  Location: [facility] OR;  Service: Podiatry;  Laterality: Bilateral;    Green' s filter Right 7/4/2012    Right Neck & Tunneled Down.    HERNIA REPAIR      "[place] [facility] [place] [employer] around th [place].", pt. states    INCISION AND DRAINAGE FOOT Left 12/24/2022    Procedure: INCISION AND DRAINAGE, FOOT;  Surgeon: Fahad Razo DPM;  Location: [facility] OR;  Service: Podiatry;  Laterality: Left;    LAPAROSCOPIC CHOLECYSTECTOMY N/A 9/10/2020    Procedure: CHOLECYSTECTOMY, LAPAROSCOPIC;  Surgeon: Montrell Gutierrez MD;  Location: [facility] OR;  Service: General;  Laterality: N/A;  RN PREOP 9/9----COVID Negative  9/9    OVARIAN CYST REMOVAL  3/13/2014    CO REMOVAL OF OVARY/TUBE(S)      SPLENECTOMY, TOTAL  July 2003    TONSILLECTOMY      as a child    TYMPANOSTOMY TUBE PLACEMENT  1976    VEIN SURGERY  2003    Lt leg       Review of patient's allergies indicates:   Allergen Reactions    Morphine Other (See Comments)     Patient had a psychotic episode after taking Morphine  Agitation, hallucinations    Penicillins Anaphylaxis     Tolerated cephalosporins in the past    Januvia [sitagliptin] Hives    Carbamazepine Other (See Comments)     hyponatremia       No current facility-administered medications on file prior to " encounter.     Current Outpatient Medications on File Prior to Encounter   Medication Sig    acetaminophen (TYLENOL) 500 MG tablet Take 2 tablets (1,000 mg total) by mouth every 6 (six) hours as needed for Pain.    DUPIXENT  mg/2 mL PnIj Inject into the skin.    gabapentin (NEURONTIN) 300 MG capsule TAKE 2 CAPSULES(600 MG) BY MOUTH TWICE DAILY    lisinopriL 10 MG tablet Take 1 tablet (10 mg total) by mouth once daily.    loratadine (CLARITIN) 10 mg tablet Take 1 tablet (10 mg total) by mouth once daily.    metFORMIN (GLUCOPHAGE) 1000 MG tablet Take 1 tablet (1,000 mg total) by mouth 2 (two) times daily with meals.    methocarbamoL (ROBAXIN) 500 MG Tab Take 500 mg by mouth. Frequency could not be confirmed.    metoprolol tartrate (LOPRESSOR) 25 MG tablet Take 1 tablet (25 mg total) by mouth 2 (two) times daily.    multivitamin Tab Take 1 tablet by mouth once daily.    pantoprazole (PROTONIX) 40 MG tablet Take 1 tablet (40 mg total) by mouth once daily.    pravastatin (PRAVACHOL) 40 MG tablet TAKE 1 TABLET(40 MG) BY MOUTH EVERY EVENING    risperiDONE (RISPERDAL M-TABS) 3 MG disintegrating tablet Take 1 tablet (3 mg total) by mouth 2 (two) times daily.    semaglutide (OZEMPIC) 1 mg/dose (4 mg/3 mL) Inject 1 mg into the skin every 7 days.    traMADoL (ULTRAM) 50 mg tablet Take 1 tablet (50 mg total) by mouth every 8 (eight) hours as needed for Pain.    VYVANSE 40 mg Cap Take 40 mg by mouth once daily.    albuterol (PROVENTIL/VENTOLIN HFA) 90 mcg/actuation inhaler INHALE 2 PUFFS INTO THE LUNGS EVERY 6 HOURS AS NEEDED FOR WHEEZING. RESCUE    albuterol-ipratropium (DUO-NEB) 2.5 mg-0.5 mg/3 mL nebulizer solution Take 3 mLs by nebulization every 6 (six) hours as needed for Wheezing or Shortness of Breath. Rescue    ammonium lactate (LAC-HYDRIN) 12 % lotion APPL Y ONCE TOPICALLY TWICE DAILY FOR 30 DAYS    apixaban (ELIQUIS) 5 mg Tab Take 1 tablet (5 mg total) by mouth 2 (two) times daily.    aspirin  81 MG Chew Take 1 tablet (81 mg total) by mouth once daily.    bumetanide (BUMEX) 1 MG tablet Take 1 tablet (1 mg total) by mouth once daily.    divalproex (DEPAKOTE) 500 MG TbEC Take 1 tablet (500 mg total) by mouth once daily. PO QAM (Patient taking differently: Take 1,500 mg by mouth every evening. PO QAM)    fluticasone propionate (FLONASE) 50 mcg/actuation nasal spray 2 sprays (100 mcg total) by Each Nostril route daily as needed (Nasal congestion).    fluticasone-salmeterol diskus inhaler 250-50 mcg Inhale 1 puff into the lungs 2 (two) times daily. Controller    hydrOXYzine (ATARAX) 50 MG tablet Take 50 mg by mouth 4 (four) times daily as needed.    nystatin (NYSTOP) powder APPLY TO ABDOMINAL AND BREAST SKIN FOLD TWICE DAILY.    [DISCONTINUED] diclofenac sodium (VOLTAREN) 1 % Gel Apply 2 g topically 4 (four) times daily as needed (Apply to painful area up to 4 times a day as needed for pain). Apply to painful area 4 times a day as needed for pain    [DISCONTINUED] furosemide (LASIX) 20 MG tablet TAKE 1 TABLET(20 MG) BY MOUTH EVERY DAY    [DISCONTINUED] QUEtiapine (SEROQUEL) 200 MG Tab Take 1 tablet (200 mg total) by mouth before breakfast.     Family History       Problem Relation (Age of Onset)    Cataracts Father    Diabetes Father, Paternal Grandfather    Heart disease Father, Paternal Grandfather    Hypertension Father    No Known Problems Mother, Sister, Brother, Maternal Aunt, Maternal Uncle, Paternal Aunt, Paternal Uncle, Maternal Grandfather    Ovarian cancer Maternal Grandmother, Paternal Grandmother          Tobacco Use    Smoking status: Every Day     Packs/day: 1.00     Years: 37.00     Pack years: 37.00     Types: Cigarettes     Last attempt to quit: 2020     Years since quittin.0    Smokeless tobacco: Never    Tobacco comments:     Enrolled in the [employer] on 5/3/14 ([facility] Member ID # [id number]). Ambulatory referral to Smoking Cessation Program   Substance and Sexual  Activity    Alcohol use: No     Alcohol/week: 0.0 standard drinks    Drug use: No    Sexual activity: Yes     Partners: Male     Review of Systems   Constitutional:  Positive for activity change, appetite change and fever. Negative for chills.   HENT:  Positive for congestion. Negative for sinus pressure, sinus pain and trouble swallowing.    Respiratory:  Negative for cough, chest tightness and shortness of breath.    Cardiovascular:  Positive for leg swelling. Negative for chest pain and palpitations.   Gastrointestinal:  Negative for abdominal pain, constipation, diarrhea, nausea and vomiting.   Endocrine: Positive for polyuria.   Genitourinary:  Negative for difficulty urinating.   Musculoskeletal:  Negative for arthralgias and myalgias.   Skin:  Positive for color change, rash and wound.   Neurological:  Positive for numbness. Negative for weakness and headaches.   Hematological:  Negative for adenopathy.   Psychiatric/Behavioral:  Negative for confusion.    Objective:     Vital Signs (Most Recent):  Temp: 98.4 °F (36.9 °C) (01/11/23 1057)  Pulse: 95 (01/11/23 1057)  Resp: 18 (01/11/23 1057)  BP: (!) 142/66 (01/11/23 1057)  SpO2: 96 % (01/11/23 1057)   Vital Signs (24h Range):  Temp:  [98.1 °F (36.7 °C)-98.8 °F (37.1 °C)] 98.4 °F (36.9 °C)  Pulse:  [77-95] 95  Resp:  [17-20] 18  SpO2:  [90 %-96 %] 96 %  BP: (110-152)/(62-70) 142/66     Weight: 107 kg (235 lb 14.3 oz)  Body mass index is 38.07 kg/m².    Physical Exam  Vitals and nursing note reviewed.   Constitutional:       General: She is not in acute distress.     Appearance: She is obese. She is not ill-appearing, toxic-appearing or diaphoretic.   HENT:      Head: Normocephalic and atraumatic.      Nose: Nose normal.      Mouth/Throat:      Mouth: Mucous membranes are moist.   Eyes:      General: No scleral icterus.     Conjunctiva/sclera: Conjunctivae normal.   Cardiovascular:      Rate and Rhythm: Normal rate and regular rhythm.      Pulses: Normal  pulses.      Heart sounds: Normal heart sounds. No murmur heard.    No gallop.   Pulmonary:      Effort: Pulmonary effort is normal. No respiratory distress.      Breath sounds: Normal breath sounds. No wheezing or rales.      Comments: Room air  Abdominal:      General: Bowel sounds are normal. There is no distension.      Palpations: Abdomen is soft. There is no mass.      Tenderness: There is no abdominal tenderness. There is no guarding.   Musculoskeletal:      Right lower leg: No edema.      Left lower leg: No edema.      Comments: L foot bandaged   Skin:     General: Skin is warm and dry.      Findings: Erythema (left lower leg) and rash present.   Neurological:      Mental Status: She is alert and oriented to person, place, and time.      Sensory: Sensory deficit (bilateral feet) present.      Motor: No weakness.                       Significant Labs: All pertinent labs within the past 24 hours have been reviewed.    Significant Imaging: I have reviewed all pertinent imaging results/findings within the past 24 hours.

## 2023-01-11 NOTE — PROGRESS NOTES
Pharmacokinetic Assessment Follow Up: IV Vancomycin    Vancomycin serum concentration assessment(s):    The trough level was drawn correctly and can be used to guide therapy at this time. The measurement is within the desired definitive target range of 15 to 20 mcg/mL.    Vancomycin Regimen Plan:    Continue regimen to Vancomycin 2000 mg IV every 12 hours with next serum trough concentration measured at 0400 prior to 3rd dose on 1/12/2023    Drug levels (last 3 results):  Recent Labs   Lab Result Units 01/11/23  0518   Vancomycin-Trough ug/mL 15.3       Pharmacy will continue to follow and monitor vancomycin.    Please contact pharmacy at extension [phone] for questions regarding this assessment.    Thank you for the consult,   Ja Carroll Jr       Patient brief summary:  Audrey Natarajan is a 52 y.o. female initiated on antimicrobial therapy with IV Vancomycin for treatment of skin & soft tissue infection    Drug Allergies:   Review of patient's allergies indicates:   Allergen Reactions    Morphine Other (See Comments)     Patient had a psychotic episode after taking Morphine  Agitation, hallucinations    Penicillins Anaphylaxis     Tolerated cephalosporins in the past    Januvia [sitagliptin] Hives    Carbamazepine Other (See Comments)     hyponatremia       Actual Body Weight:   107 kg    Renal Function:   Estimated Creatinine Clearance: 119 mL/min (based on SCr of 0.7 mg/dL).,     Dialysis Method (if applicable):  N/A    CBC (last 72 hours):  Recent Labs   Lab Result Units 01/09/23  1206 01/10/23  0531 01/11/23  0518   WBC K/uL 14.57* 10.17 10.52   Hemoglobin g/dL 9.2* 8.9* 10.3*   Hematocrit % 28.9* 27.4* 32.1*   Platelets K/uL 872* 751* 832*   Gran % % 48.2 31.8* 39.3   Lymph % % 34.7 49.5* 42.8   Mono % % 12.2 11.8 10.2   Eosinophil % % 3.4 5.4 6.0   Basophil % % 0.9 1.1 1.0   Differential Method  Automated Automated Automated       Metabolic Panel (last 72 hours):  Recent Labs   Lab Result Units  01/09/23  1206 01/10/23  0531 01/11/23  0518   Sodium mmol/L 132* 132* 136   Potassium mmol/L 4.7 5.2* 5.0   Chloride mmol/L 94* 98 100   CO2 mmol/L 28 30* 26   Glucose mg/dL 106 143* 211*   BUN mg/dL 14 17 14   Creatinine mg/dL 0.7 0.7 0.7   Albumin g/dL 3.1*  --   --    Total Bilirubin mg/dL 0.2  --   --    Alkaline Phosphatase U/L 75  --   --    AST U/L 14  --   --    ALT U/L 6*  --   --        Vancomycin Administrations:  vancomycin given in the last 96 hours                     vancomycin (VANCOCIN) 2,000 mg in dextrose 5 % 500 mL IVPB (mg) 2,000 mg New Bag 01/11/23 0611      Restarted 01/10/23 1700     2,000 mg New Bag  0103    vancomycin 1.5 g in dextrose 5 % 250 mL IVPB (ready to mix) (mg) 1,500 mg New Bag 01/09/23 1315                    Microbiologic Results:  Microbiology Results (last 7 days)       Procedure Component Value Units Date/Time    Blood culture #2 **CANNOT BE ORDERED STAT** [[id number]] Collected: 01/09/23 1214    Order Status: Completed Specimen: Blood from Peripheral, Antecubital, Left Updated: 01/10/23 1509     Blood Culture, Routine No Growth to date      No Growth to date    Blood culture #1 **CANNOT BE ORDERED STAT** [[id number]] Collected: 01/09/23 1205    Order Status: Completed Specimen: Blood from Peripheral, Antecubital, Left Updated: 01/10/23 1508     Blood Culture, Routine No Growth to date      No Growth to date    Aerobic culture [[id number]] Collected: 01/09/23 1647    Order Status: Completed Specimen: Wound from Foot, Left Updated: 01/10/23 1019     Aerobic Bacterial Culture Insufficient incubation, culture in progress    Gram stain [[id number]] Collected: 01/09/23 1647    Order Status: Completed Specimen: Wound from Foot, Left Updated: 01/10/23 0803     Gram Stain Result No WBC's      No organisms seen

## 2023-01-11 NOTE — PROGRESS NOTES
[facility] Medicine  Progress Note    Patient Name: Audrey Natarajan  MRN: [id number]  Patient Class: IP- Inpatient   Admission Date: 1/9/2023  Length of Stay: 2 days  Attending Physician: Micha Grossman MD  Primary Care Provider: Donaldo Pena MD        Subjective:     Principal Problem:Diabetic ulcer of left midfoot associated with type 2 diabetes mellitus, limited to breakdown of skin        HPI:  Ms Audrey Natarajan is a 52 y.o. woman with DM who presents with worsening L leg swelling.     She was recently admitted 12/22-27/2022 with worsening L plantar foot ulceration. S/p debridement by Podiatry. Bone cultures no growth. Wound culture with MRSA. Seen by ID who recommended bactrim.    She took the bactrim as prescribed with last dose taken 1/5/2023. She noticed improvement in swelling and erythema on bactrim but then noticed worsening swelling and erythema to the L foot and leg. She followed up in wound care on 12/29 and then again on 1/6. Per notes on 1/6, wound had doubled in size.     She has noticed decreased appetite, increased thirst, increased urination, and high glucoses. She does smoke cigarettes.         Overview/Hospital Course:  Patient admitted for L diabetic foot ulcer. Podiatry consulted as well as ID       Interval History: No new issues     Review of Systems   Constitutional:  Positive for activity change. Negative for chills, diaphoresis, fatigue and fever.   HENT:  Negative for congestion and dental problem.    Eyes:  Negative for discharge and itching.   Respiratory:  Negative for apnea.    Cardiovascular:  Negative for chest pain.   Gastrointestinal:  Negative for abdominal pain.   Genitourinary:  Negative for difficulty urinating and dyspareunia.   Skin:  Negative for color change and pallor.   Neurological:  Negative for facial asymmetry.   Psychiatric/Behavioral:  Negative for agitation and behavioral problems.    Objective:     Vital Signs (Most  Recent):  Temp: 98.7 °F (37.1 °C) (01/11/23 0707)  Pulse: 83 (01/11/23 0707)  Resp: 18 (01/11/23 0707)  BP: (!) 152/68 (01/11/23 0707)  SpO2: 96 % (01/11/23 0707)   Vital Signs (24h Range):  Temp:  [98.1 °F (36.7 °C)-98.8 °F (37.1 °C)] 98.7 °F (37.1 °C)  Pulse:  [77-88] 83  Resp:  [17-20] 18  SpO2:  [90 %-96 %] 96 %  BP: (110-152)/(62-70) 152/68     Weight: 107 kg (235 lb 14.3 oz)  Body mass index is 38.07 kg/m².    Intake/Output Summary (Last 24 hours) at 1/11/2023 0916  Last data filed at 1/11/2023 0838  Gross per 24 hour   Intake 795 ml   Output 4900 ml   Net -4105 ml      Physical Exam  Vitals and nursing note reviewed.   Constitutional:       General: She is not in acute distress.     Appearance: Normal appearance. She is obese. She is not ill-appearing or toxic-appearing.   HENT:      Head: Normocephalic and atraumatic.      Nose: Nose normal.      Mouth/Throat:      Pharynx: Oropharynx is clear.   Eyes:      Conjunctiva/sclera: Conjunctivae normal.   Cardiovascular:      Rate and Rhythm: Normal rate and regular rhythm.   Pulmonary:      Effort: Pulmonary effort is normal. No respiratory distress.   Skin:     General: Skin is dry.   Neurological:      Mental Status: She is alert and oriented to person, place, and time.   Psychiatric:         Mood and Affect: Mood normal.         Behavior: Behavior normal.       Significant Labs: All pertinent labs within the past 24 hours have been reviewed.  BMP:   Recent Labs   Lab 01/11/23  0518   *      K 5.0      CO2 26   BUN 14   CREATININE 0.7   CALCIUM 9.3     CBC:   Recent Labs   Lab 01/09/23  1206 01/10/23  0531 01/11/23  0518   WBC 14.57* 10.17 10.52   HGB 9.2* 8.9* 10.3*   HCT 28.9* 27.4* 32.1*   * 751* 832*       Significant Imaging:       Assessment/Plan:      * Diabetic ulcer of left midfoot associated with type 2 diabetes mellitus, limited to breakdown of skin  Admitted with worsening L plantar DM foot wound. S/p I&D in 12/2022 with  bone cultures no growth and treated for skin/soft tissue infection with bactrim x10 days. Worsening with enlarging wound and cellulitis involving the foot and the leg.   - On vanc/ceftriaxone  - Podiatry consulted     Patient's FSGs are uncontrolled due to hyperglycemia on current medication regimen.  Last A1c reviewed-   Lab Results   Component Value Date    HGBA1C 7.1 (H) 12/22/2022     Most recent fingerstick glucose reviewed-   Recent Labs   Lab 01/10/23  1730 01/11/23  0709   POCTGLUCOSE 147* 216*     Current correctional scale  Low  Maintain anti-hyperglycemic dose as follows-   Antihyperglycemics (From admission, onward)    Start     Stop Route Frequency Ordered    01/09/23 2100  insulin detemir U-100 pen 10 Units         -- SubQ Nightly 01/09/23 1620    01/09/23 1730  insulin aspart U-100 pen 3 Units         -- SubQ 3 times daily with meals 01/09/23 1620    01/09/23 1719  insulin aspart U-100 pen 0-5 Units         -- SubQ Before meals & nightly PRN 01/09/23 1620        Hold Oral hypoglycemics while patient is in the hospital.  Podiatry consulted. Blood cultures are NG  Wound with Staph. ID consulted for recs for discharge    Charcot's joint of left foot  This contributes to wound formation       Cellulitis of left foot  See DM foot wound      Iron deficiency anemia  Iron deficient by labs 12/2022. Not appropriate for IV iron in setting of active infection.   - started PO iron       Hyponatremia  Na slightly low on admit. May be attributed to antipsychotics. Not symptomatic.   - BMP in AM      Class 2 severe obesity with serious comorbidity and body mass index (BMI) of 39.0 to 39.9 in adult  Body mass index is 39.71 kg/m². Morbid obesity complicates all aspects of disease management from diagnostic modalities to treatment. Weight loss encouraged and health benefits explained to patient.         Thrombocytosis  Plts elevated most likely due to iron deficiency. Treat iron deficiency.       Cellulitis of left  lower extremity  See DM foot wound      Bipolar 1 disorder  No signs of acute bibi or depression. She is NOT taking carbamazepine.   - continue risperdal- takes 3mg BID  - continue depakote- takes 1500mg QHS      History of DVT (deep vein thrombosis)  History of DVT and PE. Most recent US shows no DVT.   - continue home eliquis       Tobacco abuse  Patient was counseled on smoking cessation for 4 minutes. Encouraged cessation.       Hyperlipidemia  Continue home pravastatin       COPD (chronic obstructive pulmonary disease)  No PFTs to review. No signs of acute exacerbation. Stable on room air.       Essential hypertension  BP is borderline. Hold home metoprolol and lisinopril for now.       Type II diabetes mellitus with neurological manifestations  With neuropathy affecting feet. On gabapentin but not filled since 11/2022  - continue gabapentin       VTE Risk Mitigation (From admission, onward)         Ordered     apixaban tablet 5 mg  2 times daily         01/09/23 1620     IP VTE HIGH RISK PATIENT  Once         01/09/23 1620     Reason for No Pharmacological VTE Prophylaxis  Once        Question:  Reasons:  Answer:  Already adequately anticoagulated on oral Anticoagulants    01/09/23 1620                Discharge Planning   HUMBERTO:      Code Status: Full Code   Is the patient medically ready for discharge?:     Reason for patient still in hospital (select all that apply): Patient unstable  Discharge Plan A: Home                  Micha Lema MD  Department of Hospital Medicine   [facility] Surg

## 2023-01-11 NOTE — ASSESSMENT & PLAN NOTE
"52M with h/o DM with charcot foot, DVT on Eliquis, PAD, ongoing tobacco abuse admitted 1/9 with worsening L leg swelling. Notably she has had several admits over the past several months for L foot infections (12/22 cultures with MDR MRSA treated with bactrim).   Bcx NGTD. MRI from 1/10- Soft tissue ulceration and edema could represent cellulitis.  See above comments.  No focal abscess or acute osteomyelitis is detected. Podiatry sent wound swab, growing staph aureus (susceptibilties pending). Per podiatry "Personally believe patient would benefit from longer course of IV abx and placement in LTAC or SNF". Vascular following. ID consulted for "abx recs for discharge. Thanks"     Recommendations:   - continue vancomycin. Will de-escalate based on pending culture results  - would appreciate bone biopsy sent for path/culture, if possible, to confirm presence of osteomyelitis.   - wound care as per podiatry  - please ensure she has adequate perfusion to heal wound  - tobacco cessation     "

## 2023-01-11 NOTE — SUBJECTIVE & OBJECTIVE
"Past Medical History:   Diagnosis Date    ADHD (attention deficit hyperactivity disorder)     Arthritis     Asthma     Bipolar 1 disorder     Cataract     Cigarette smoker     COPD (chronic obstructive pulmonary disease)     Coronary artery disease     A fib    Depression     bipolar manic depresson    Diabetes mellitus     Diabetic foot ulcers     Diabetic neuropathy     DVT of lower extremity, bilateral 07/2013    bilateral LE DVT. [place] filter placed.     Encounter for blood transfusion     History of blood clots 1. Left Leg=2003; 2.Bilateral Groin=Blood Clots= 5 or 6/ 2013 & 7/2013; 3. LLL of Lung=7/2013;  4. Lt. Lower Leg=7/2013.     Pt. had 1st Blood Clot after Wpjmglzwcovc=9982, & Last=2013. [place] Filter= Rt.Lateral Neck.    HTN (hypertension) 06/06/2013    Pt states that she does not have hypertension    Hypercholesteremia     Irregular heartbeat     Neuromuscular disorder     neuropathy feet    Obese     PE (pulmonary embolism) 07/2013    bilat LE DVT.     Restless leg syndrome        Past Surgical History:   Procedure Laterality Date    ABDOMINAL SURGERY  2010    gastric sleeve    BILATERAL OOPHORECTOMY Bilateral 1/12/2015    CHOLECYSTECTOMY      DEBRIDEMENT OF FOOT Bilateral 5/10/2022    Procedure: DEBRIDEMENT, FOOT;  Surgeon: Maira De Los Santos DPM;  Location: [facility] OR;  Service: Podiatry;  Laterality: Bilateral;    Green' s filter Right 7/4/2012    Right Neck & Tunneled Down.    HERNIA REPAIR      "Hannah of Hernias Repaires around th [place].", pt. states    INCISION AND DRAINAGE FOOT Left 12/24/2022    Procedure: INCISION AND DRAINAGE, FOOT;  Surgeon: Fahad Razo DPM;  Location: [facility] OR;  Service: Podiatry;  Laterality: Left;    LAPAROSCOPIC CHOLECYSTECTOMY N/A 9/10/2020    Procedure: CHOLECYSTECTOMY, LAPAROSCOPIC;  Surgeon: Montrell Gutierrez MD;  Location: [facility] OR;  Service: General;  Laterality: N/A;  RN PREOP 9/9----COVID Negative  9/9    OVARIAN CYST REMOVAL  3/13/2014    OH REMOVAL OF " OVARY/TUBE(S)      SPLENECTOMY, TOTAL  July 2003    TONSILLECTOMY      as a child    TYMPANOSTOMY TUBE PLACEMENT  1976    VEIN SURGERY  2003    Lt leg       Review of patient's allergies indicates:   Allergen Reactions    Morphine Other (See Comments)     Patient had a psychotic episode after taking Morphine  Agitation, hallucinations    Penicillins Anaphylaxis     Tolerated cephalosporins in the past    Januvia [sitagliptin] Hives    Carbamazepine Other (See Comments)     hyponatremia       No current facility-administered medications on file prior to encounter.     Current Outpatient Medications on File Prior to Encounter   Medication Sig    acetaminophen (TYLENOL) 500 MG tablet Take 2 tablets (1,000 mg total) by mouth every 6 (six) hours as needed for Pain.    DUPIXENT  mg/2 mL PnIj Inject into the skin.    gabapentin (NEURONTIN) 300 MG capsule TAKE 2 CAPSULES(600 MG) BY MOUTH TWICE DAILY    lisinopriL 10 MG tablet Take 1 tablet (10 mg total) by mouth once daily.    loratadine (CLARITIN) 10 mg tablet Take 1 tablet (10 mg total) by mouth once daily.    metFORMIN (GLUCOPHAGE) 1000 MG tablet Take 1 tablet (1,000 mg total) by mouth 2 (two) times daily with meals.    methocarbamoL (ROBAXIN) 500 MG Tab Take 500 mg by mouth. Frequency could not be confirmed.    metoprolol tartrate (LOPRESSOR) 25 MG tablet Take 1 tablet (25 mg total) by mouth 2 (two) times daily.    multivitamin Tab Take 1 tablet by mouth once daily.    pantoprazole (PROTONIX) 40 MG tablet Take 1 tablet (40 mg total) by mouth once daily.    pravastatin (PRAVACHOL) 40 MG tablet TAKE 1 TABLET(40 MG) BY MOUTH EVERY EVENING    risperiDONE (RISPERDAL M-TABS) 3 MG disintegrating tablet Take 1 tablet (3 mg total) by mouth 2 (two) times daily.    semaglutide (OZEMPIC) 1 mg/dose (4 mg/3 mL) Inject 1 mg into the skin every 7 days.    traMADoL (ULTRAM) 50 mg tablet Take 1 tablet (50 mg total) by mouth every 8 (eight) hours as needed for Pain.    VYVANSE 40  mg Cap Take 40 mg by mouth once daily.    albuterol (PROVENTIL/VENTOLIN HFA) 90 mcg/actuation inhaler INHALE 2 PUFFS INTO THE LUNGS EVERY 6 HOURS AS NEEDED FOR WHEEZING. RESCUE    albuterol-ipratropium (DUO-NEB) 2.5 mg-0.5 mg/3 mL nebulizer solution Take 3 mLs by nebulization every 6 (six) hours as needed for Wheezing or Shortness of Breath. Rescue    ammonium lactate (LAC-HYDRIN) 12 % lotion APPL Y ONCE TOPICALLY TWICE DAILY FOR 30 DAYS    apixaban (ELIQUIS) 5 mg Tab Take 1 tablet (5 mg total) by mouth 2 (two) times daily.    aspirin 81 MG Chew Take 1 tablet (81 mg total) by mouth once daily.    bumetanide (BUMEX) 1 MG tablet Take 1 tablet (1 mg total) by mouth once daily.    divalproex (DEPAKOTE) 500 MG TbEC Take 1 tablet (500 mg total) by mouth once daily. PO QAM (Patient taking differently: Take 1,500 mg by mouth every evening. PO QAM)    fluticasone propionate (FLONASE) 50 mcg/actuation nasal spray 2 sprays (100 mcg total) by Each Nostril route daily as needed (Nasal congestion).    fluticasone-salmeterol diskus inhaler 250-50 mcg Inhale 1 puff into the lungs 2 (two) times daily. Controller    hydrOXYzine (ATARAX) 50 MG tablet Take 50 mg by mouth 4 (four) times daily as needed.    nystatin (NYSTOP) powder APPLY TO ABDOMINAL AND BREAST SKIN FOLD TWICE DAILY.    [DISCONTINUED] diclofenac sodium (VOLTAREN) 1 % Gel Apply 2 g topically 4 (four) times daily as needed (Apply to painful area up to 4 times a day as needed for pain). Apply to painful area 4 times a day as needed for pain    [DISCONTINUED] furosemide (LASIX) 20 MG tablet TAKE 1 TABLET(20 MG) BY MOUTH EVERY DAY    [DISCONTINUED] QUEtiapine (SEROQUEL) 200 MG Tab Take 1 tablet (200 mg total) by mouth before breakfast.     Family History       Problem Relation (Age of Onset)    Cataracts Father    Diabetes Father, Paternal Grandfather    Heart disease Father, Paternal Grandfather    Hypertension Father    No Known Problems Mother, Sister, Brother, Maternal  Aunt, Maternal Uncle, Paternal Aunt, Paternal Uncle, Maternal Grandfather    Ovarian cancer Maternal Grandmother, Paternal Grandmother          Tobacco Use    Smoking status: Every Day     Packs/day: 1.00     Years: 37.00     Pack years: 37.00     Types: Cigarettes     Last attempt to quit: 2020     Years since quittin.0    Smokeless tobacco: Never    Tobacco comments:     Enrolled in the [facility] on 5/3/14 ([facility] Member ID # [id number]). Ambulatory referral to Smoking Cessation Program   Substance and Sexual Activity    Alcohol use: No     Alcohol/week: 0.0 standard drinks    Drug use: No    Sexual activity: Yes     Partners: Male     Review of Systems   Constitutional:  Positive for activity change, appetite change and fever. Negative for chills.   HENT:  Positive for congestion. Negative for sinus pressure, sinus pain and trouble swallowing.    Respiratory:  Negative for cough, chest tightness and shortness of breath.    Cardiovascular:  Positive for leg swelling. Negative for chest pain and palpitations.   Gastrointestinal:  Negative for abdominal pain, constipation, diarrhea, nausea and vomiting.   Endocrine: Positive for polyuria.   Genitourinary:  Negative for difficulty urinating.   Musculoskeletal:  Negative for arthralgias and myalgias.   Skin:  Positive for color change, rash and wound.   Neurological:  Positive for numbness. Negative for weakness and headaches.   Hematological:  Negative for adenopathy.   Psychiatric/Behavioral:  Negative for confusion.    Objective:     Vital Signs (Most Recent):  Temp: 98.4 °F (36.9 °C) (23 1057)  Pulse: 95 (23 1057)  Resp: 18 (23 105)  BP: (!) 142/66 (23 1057)  SpO2: 96 % (23 1057)   Vital Signs (24h Range):  Temp:  [98.1 °F (36.7 °C)-98.8 °F (37.1 °C)] 98.4 °F (36.9 °C)  Pulse:  [77-95] 95  Resp:  [17-20] 18  SpO2:  [90 %-96 %] 96 %  BP: (110-152)/(62-70) 142/66     Weight: 107 kg (235 lb 14.3 oz)  Body mass index is 38.07  kg/m².    Physical Exam  Vitals and nursing note reviewed.   Constitutional:       General: She is not in acute distress.     Appearance: She is obese. She is not ill-appearing, toxic-appearing or diaphoretic.   HENT:      Head: Normocephalic and atraumatic.      Nose: Nose normal.      Mouth/Throat:      Mouth: Mucous membranes are moist.   Eyes:      General: No scleral icterus.     Conjunctiva/sclera: Conjunctivae normal.   Cardiovascular:      Rate and Rhythm: Normal rate and regular rhythm.      Pulses: Normal pulses.      Heart sounds: Normal heart sounds. No murmur heard.    No gallop.   Pulmonary:      Effort: Pulmonary effort is normal. No respiratory distress.      Breath sounds: Normal breath sounds. No wheezing or rales.      Comments: Room air  Abdominal:      General: Bowel sounds are normal. There is no distension.      Palpations: Abdomen is soft. There is no mass.      Tenderness: There is no abdominal tenderness. There is no guarding.   Musculoskeletal:      Right lower leg: No edema.      Left lower leg: No edema.      Comments: L foot bandaged   Skin:     General: Skin is warm and dry.      Findings: Erythema (left lower leg) and rash present.   Neurological:      Mental Status: She is alert and oriented to person, place, and time.      Sensory: Sensory deficit (bilateral feet) present.      Motor: No weakness.                       Significant Labs: All pertinent labs within the past 24 hours have been reviewed.    Significant Imaging: I have reviewed all pertinent imaging results/findings within the past 24 hours.

## 2023-01-11 NOTE — PLAN OF CARE
SW notified patient that Dr. Gill is recommending SNF or LTAC for IV abx and wound care. SW listed SNF in network with insurance and LTAC in network. Patient stated that she would prefer [facility] in [place] for SNF. SW informed that referral will be sent to all SNF in [facility] with preference in mind. MELVI informed that referral will also be sent to [facility] LT[facility].        01/11/23 1516   Post-Acute Status   Post-Acute Authorization Placement  (SNF/LTAC)   Post-Acute Placement Status Discharge Plan Changed   Coverage Medicaid   Discharge Delays (!) Post-Acute Set-up   Discharge Plan   Discharge Plan A Long-term acute care facility (LTAC)   Discharge Plan B Skilled Nursing Facility

## 2023-01-11 NOTE — SUBJECTIVE & OBJECTIVE
Interval History: No new issues     Review of Systems   Constitutional:  Positive for activity change. Negative for chills, diaphoresis, fatigue and fever.   HENT:  Negative for congestion and dental problem.    Eyes:  Negative for discharge and itching.   Respiratory:  Negative for apnea.    Cardiovascular:  Negative for chest pain.   Gastrointestinal:  Negative for abdominal pain.   Genitourinary:  Negative for difficulty urinating and dyspareunia.   Skin:  Negative for color change and pallor.   Neurological:  Negative for facial asymmetry.   Psychiatric/Behavioral:  Negative for agitation and behavioral problems.    Objective:     Vital Signs (Most Recent):  Temp: 98.7 °F (37.1 °C) (01/11/23 0707)  Pulse: 83 (01/11/23 0707)  Resp: 18 (01/11/23 0707)  BP: (!) 152/68 (01/11/23 0707)  SpO2: 96 % (01/11/23 0707)   Vital Signs (24h Range):  Temp:  [98.1 °F (36.7 °C)-98.8 °F (37.1 °C)] 98.7 °F (37.1 °C)  Pulse:  [77-88] 83  Resp:  [17-20] 18  SpO2:  [90 %-96 %] 96 %  BP: (110-152)/(62-70) 152/68     Weight: 107 kg (235 lb 14.3 oz)  Body mass index is 38.07 kg/m².    Intake/Output Summary (Last 24 hours) at 1/11/2023 0916  Last data filed at 1/11/2023 0838  Gross per 24 hour   Intake 795 ml   Output 4900 ml   Net -4105 ml      Physical Exam  Vitals and nursing note reviewed.   Constitutional:       General: She is not in acute distress.     Appearance: Normal appearance. She is obese. She is not ill-appearing or toxic-appearing.   HENT:      Head: Normocephalic and atraumatic.      Nose: Nose normal.      Mouth/Throat:      Pharynx: Oropharynx is clear.   Eyes:      Conjunctiva/sclera: Conjunctivae normal.   Cardiovascular:      Rate and Rhythm: Normal rate and regular rhythm.   Pulmonary:      Effort: Pulmonary effort is normal. No respiratory distress.   Skin:     General: Skin is dry.   Neurological:      Mental Status: She is alert and oriented to person, place, and time.   Psychiatric:         Mood and Affect:  Mood normal.         Behavior: Behavior normal.       Significant Labs: All pertinent labs within the past 24 hours have been reviewed.  BMP:   Recent Labs   Lab 01/11/23  0518   *      K 5.0      CO2 26   BUN 14   CREATININE 0.7   CALCIUM 9.3     CBC:   Recent Labs   Lab 01/09/23  1206 01/10/23  0531 01/11/23  0518   WBC 14.57* 10.17 10.52   HGB 9.2* 8.9* 10.3*   HCT 28.9* 27.4* 32.1*   * 751* 832*       Significant Imaging:

## 2023-01-11 NOTE — NURSING
Patient noted AAOX4, denies chest pain or SOB.  Endorses vaginal itching related to antibiotics.  New orders noted for vaginal cream and diflucan x1 dose.  Patient OOBTC, LLE elevated.  Will continue to monitor.  Call bell in reach, updated on the current treatment plan.

## 2023-01-11 NOTE — HPI
"52M with h/o DM with charcot foot, DVT on Eliquis, PAD, tobacco abuse admitted  1/9 with worsening L leg swelling. Notably she has had several admits over the past several months for L foot infections. Reports wound getting bigger. Denies f/c. Reports compliance with pressure offloading (uses scooter to get around). Prior MRI without OM and was treated with po abx inculding cefadroxil and po bactrim. Says she completed bactrim    Bcx NGTD    MRI from 1/10-   Soft tissue ulceration and edema could represent cellulitis.  See above comments.  No focal abscess or acute osteomyelitis is detected.  Recommend follow-up.    Podiatry sent wound swab  Specimen Information: Foot, Left; Wound   0 Result Notes  Component 2 d ago    Aerobic Bacterial Culture  Abnormal   STAPHYLOCOCCUS AUREUS   Moderate   Susceptibility pending           Per podiatry "Personally believe patient would benefit from longer course of IV abx and placement in LTAC or SNF"    JEYSON with  hemodynamically significant stenosis in the left and DPA. Multifocal mild to moderate grade stenoses is suspected throughout the left MIRACLE and, bilateral posterior tibial and peroneal arteris.    Vascular following     Note pt with PCN allergy, but has tolerated keflex and ancef during prior admissions    Smokes cigarettes    ID consulted for "abx recs for discharge. Thanks"  "

## 2023-01-12 ENCOUNTER — CLINICAL SUPPORT (OUTPATIENT)
Dept: ENDOSCOPY | Facility: HOSPITAL | Age: 53
DRG: 638 | End: 2023-01-12
Attending: INTERNAL MEDICINE
Payer: MEDICAID

## 2023-01-12 DIAGNOSIS — Z12.11 SCREENING FOR COLON CANCER: ICD-10-CM

## 2023-01-12 LAB
ANION GAP SERPL CALC-SCNC: 8 MMOL/L (ref 8–16)
BASOPHILS # BLD AUTO: 0.12 K/UL (ref 0–0.2)
BASOPHILS NFR BLD: 1.2 % (ref 0–1.9)
BUN SERPL-MCNC: 11 MG/DL (ref 6–20)
CALCIUM SERPL-MCNC: 9.4 MG/DL (ref 8.7–10.5)
CHLORIDE SERPL-SCNC: 98 MMOL/L (ref 95–110)
CO2 SERPL-SCNC: 27 MMOL/L (ref 23–29)
CREAT SERPL-MCNC: 0.6 MG/DL (ref 0.5–1.4)
DIFFERENTIAL METHOD: ABNORMAL
EOSINOPHIL # BLD AUTO: 0.5 K/UL (ref 0–0.5)
EOSINOPHIL NFR BLD: 4.8 % (ref 0–8)
ERYTHROCYTE [DISTWIDTH] IN BLOOD BY AUTOMATED COUNT: 16.7 % (ref 11.5–14.5)
EST. GFR  (NO RACE VARIABLE): >60 ML/MIN/1.73 M^2
GLUCOSE SERPL-MCNC: 148 MG/DL (ref 70–110)
HCT VFR BLD AUTO: 31.8 % (ref 37–48.5)
HGB BLD-MCNC: 10.1 G/DL (ref 12–16)
IMM GRANULOCYTES # BLD AUTO: 0.09 K/UL (ref 0–0.04)
IMM GRANULOCYTES NFR BLD AUTO: 0.9 % (ref 0–0.5)
LYMPHOCYTES # BLD AUTO: 4.5 K/UL (ref 1–4.8)
LYMPHOCYTES NFR BLD: 45.5 % (ref 18–48)
MCH RBC QN AUTO: 24.9 PG (ref 27–31)
MCHC RBC AUTO-ENTMCNC: 31.8 G/DL (ref 32–36)
MCV RBC AUTO: 79 FL (ref 82–98)
MONOCYTES # BLD AUTO: 1.2 K/UL (ref 0.3–1)
MONOCYTES NFR BLD: 12 % (ref 4–15)
NEUTROPHILS # BLD AUTO: 3.5 K/UL (ref 1.8–7.7)
NEUTROPHILS NFR BLD: 35.6 % (ref 38–73)
NRBC BLD-RTO: 0 /100 WBC
PLATELET # BLD AUTO: 744 K/UL (ref 150–450)
PMV BLD AUTO: 8.8 FL (ref 9.2–12.9)
POCT GLUCOSE: 134 MG/DL (ref 70–110)
POCT GLUCOSE: 198 MG/DL (ref 70–110)
POCT GLUCOSE: 248 MG/DL (ref 70–110)
POCT GLUCOSE: 253 MG/DL (ref 70–110)
POTASSIUM SERPL-SCNC: 4.7 MMOL/L (ref 3.5–5.1)
RBC # BLD AUTO: 4.05 M/UL (ref 4–5.4)
SODIUM SERPL-SCNC: 133 MMOL/L (ref 136–145)
VANCOMYCIN TROUGH SERPL-MCNC: 18.7 UG/ML (ref 10–22)
WBC # BLD AUTO: 9.77 K/UL (ref 3.9–12.7)

## 2023-01-12 PROCEDURE — 97161 PT EVAL LOW COMPLEX 20 MIN: CPT

## 2023-01-12 PROCEDURE — 25000003 PHARM REV CODE 250: Performed by: INTERNAL MEDICINE

## 2023-01-12 PROCEDURE — 80202 ASSAY OF VANCOMYCIN: CPT | Performed by: INTERNAL MEDICINE

## 2023-01-12 PROCEDURE — 80048 BASIC METABOLIC PNL TOTAL CA: CPT | Performed by: HOSPITALIST

## 2023-01-12 PROCEDURE — A4216 STERILE WATER/SALINE, 10 ML: HCPCS | Performed by: INTERNAL MEDICINE

## 2023-01-12 PROCEDURE — A4216 STERILE WATER/SALINE, 10 ML: HCPCS | Performed by: HOSPITALIST

## 2023-01-12 PROCEDURE — 11000001 HC ACUTE MED/SURG PRIVATE ROOM

## 2023-01-12 PROCEDURE — 25000003 PHARM REV CODE 250: Performed by: HOSPITALIST

## 2023-01-12 PROCEDURE — S4991 NICOTINE PATCH NONLEGEND: HCPCS | Performed by: INTERNAL MEDICINE

## 2023-01-12 PROCEDURE — 27000207 HC ISOLATION

## 2023-01-12 PROCEDURE — 85025 COMPLETE CBC W/AUTO DIFF WBC: CPT | Performed by: HOSPITALIST

## 2023-01-12 PROCEDURE — 63600175 PHARM REV CODE 636 W HCPCS: Performed by: HOSPITALIST

## 2023-01-12 PROCEDURE — 97116 GAIT TRAINING THERAPY: CPT

## 2023-01-12 PROCEDURE — 99232 PR SUBSEQUENT HOSPITAL CARE,LEVL II: ICD-10-PCS | Mod: ,,, | Performed by: INTERNAL MEDICINE

## 2023-01-12 PROCEDURE — 99232 SBSQ HOSP IP/OBS MODERATE 35: CPT | Mod: ,,, | Performed by: INTERNAL MEDICINE

## 2023-01-12 PROCEDURE — 97165 OT EVAL LOW COMPLEX 30 MIN: CPT

## 2023-01-12 RX ADMIN — Medication 10 ML: at 12:01

## 2023-01-12 RX ADMIN — SENNOSIDES AND DOCUSATE SODIUM 1 TABLET: 50; 8.6 TABLET ORAL at 08:01

## 2023-01-12 RX ADMIN — APIXABAN 5 MG: 5 TABLET, FILM COATED ORAL at 09:01

## 2023-01-12 RX ADMIN — Medication 10 ML: at 06:01

## 2023-01-12 RX ADMIN — Medication 1 PATCH: at 09:01

## 2023-01-12 RX ADMIN — Medication 10 ML: at 04:01

## 2023-01-12 RX ADMIN — OXYCODONE AND ACETAMINOPHEN 1 TABLET: 10; 325 TABLET ORAL at 05:01

## 2023-01-12 RX ADMIN — BUMETANIDE 1 MG: 1 TABLET ORAL at 09:01

## 2023-01-12 RX ADMIN — PRAVASTATIN SODIUM 40 MG: 40 TABLET ORAL at 08:01

## 2023-01-12 RX ADMIN — VANCOMYCIN HYDROCHLORIDE 2000 MG: 1 INJECTION, POWDER, LYOPHILIZED, FOR SOLUTION INTRAVENOUS at 04:01

## 2023-01-12 RX ADMIN — LIDOCAINE 1 PATCH: 50 PATCH CUTANEOUS at 09:01

## 2023-01-12 RX ADMIN — GABAPENTIN 600 MG: 300 CAPSULE ORAL at 08:01

## 2023-01-12 RX ADMIN — CEFEPIME HYDROCHLORIDE 2 G: 2 INJECTION, SOLUTION INTRAVENOUS at 08:01

## 2023-01-12 RX ADMIN — MUPIROCIN: 20 OINTMENT TOPICAL at 09:01

## 2023-01-12 RX ADMIN — DIVALPROEX SODIUM 1500 MG: 250 TABLET, DELAYED RELEASE ORAL at 08:01

## 2023-01-12 RX ADMIN — GABAPENTIN 600 MG: 300 CAPSULE ORAL at 09:01

## 2023-01-12 RX ADMIN — CEFEPIME HYDROCHLORIDE 2 G: 2 INJECTION, SOLUTION INTRAVENOUS at 11:01

## 2023-01-12 RX ADMIN — FERROUS SULFATE TAB 325 MG (65 MG ELEMENTAL FE) 1 EACH: 325 (65 FE) TAB at 09:01

## 2023-01-12 RX ADMIN — OXYCODONE AND ACETAMINOPHEN 1 TABLET: 10; 325 TABLET ORAL at 10:01

## 2023-01-12 RX ADMIN — INSULIN ASPART 1 UNITS: 100 INJECTION, SOLUTION INTRAVENOUS; SUBCUTANEOUS at 09:01

## 2023-01-12 RX ADMIN — RISPERIDONE 3 MG: 1 TABLET ORAL at 08:01

## 2023-01-12 RX ADMIN — MICONAZOLE NITRATE: 20 CREAM TOPICAL at 08:01

## 2023-01-12 RX ADMIN — INSULIN ASPART 3 UNITS: 100 INJECTION, SOLUTION INTRAVENOUS; SUBCUTANEOUS at 04:01

## 2023-01-12 RX ADMIN — INSULIN ASPART 3 UNITS: 100 INJECTION, SOLUTION INTRAVENOUS; SUBCUTANEOUS at 07:01

## 2023-01-12 RX ADMIN — SENNOSIDES AND DOCUSATE SODIUM 1 TABLET: 50; 8.6 TABLET ORAL at 09:01

## 2023-01-12 RX ADMIN — Medication 10 ML: at 11:01

## 2023-01-12 RX ADMIN — MICONAZOLE NITRATE: 20 CREAM TOPICAL at 10:01

## 2023-01-12 RX ADMIN — INSULIN ASPART 3 UNITS: 100 INJECTION, SOLUTION INTRAVENOUS; SUBCUTANEOUS at 11:01

## 2023-01-12 RX ADMIN — INSULIN DETEMIR 10 UNITS: 100 INJECTION, SOLUTION SUBCUTANEOUS at 09:01

## 2023-01-12 RX ADMIN — ONDANSETRON 4 MG: 2 INJECTION INTRAMUSCULAR; INTRAVENOUS at 11:01

## 2023-01-12 RX ADMIN — OXYCODONE AND ACETAMINOPHEN 1 TABLET: 10; 325 TABLET ORAL at 04:01

## 2023-01-12 RX ADMIN — CEFEPIME HYDROCHLORIDE 2 G: 2 INJECTION, SOLUTION INTRAVENOUS at 04:01

## 2023-01-12 RX ADMIN — RISPERIDONE 3 MG: 1 TABLET ORAL at 09:01

## 2023-01-12 RX ADMIN — APIXABAN 5 MG: 5 TABLET, FILM COATED ORAL at 08:01

## 2023-01-12 RX ADMIN — VANCOMYCIN HYDROCHLORIDE 2000 MG: 1 INJECTION, POWDER, LYOPHILIZED, FOR SOLUTION INTRAVENOUS at 07:01

## 2023-01-12 NOTE — CONSULTS
"[facility] Surg  Infectious Disease  Consult Note    Patient Name: Audrey Natarajan  MRN: [id number]  Admission Date: 1/9/2023  Hospital Length of Stay: 3 days  Attending Physician: Micha Grossman MD  Primary Care Provider: Donaldo Pena MD     Isolation Status: Contact    Patient information was obtained from patient and ER records.      Consults  Assessment/Plan:     * Diabetic ulcer of left midfoot associated with type 2 diabetes mellitus, limited to breakdown of skin  52M with h/o DM with charcot foot, DVT on Eliquis, PAD, ongoing tobacco abuse admitted 1/9 with worsening L leg swelling. Notably she has had several admits over the past several months for L foot infections (12/22 cultures with MDR MRSA treated with bactrim).   Bcx NGTD. MRI from 1/10- Soft tissue ulceration and edema could represent cellulitis.  No focal abscess or acute osteomyelitis is detected. Podiatry sent wound swab, growing MRSA and enterococcus (susceptibilties pending). Per podiatry "Personally believe patient would benefit from longer course of IV abx and placement in LTAC or SNF". Vascular following. ID consulted for "abx recs for discharge. Thanks"     Recommendations:   - continue vancomycin. Will de-escalate based on pending culture results. Not opposed to stopping the cefepime. Would wait for enterococcus susceptibilities to finalize before d/c  - would appreciate bone biopsy sent for path/culture, if possible, to confirm presence of osteomyelitis.   - wound care as per podiatry  - please ensure she has adequate perfusion to heal wound  - tobacco cessation     Outpatient Antibiotic Therapy Plan:    Please send referral to [facility].    1) Infection: foot skin/soft tissue infection    2) Discharge Antibiotics:    Intravenous antibiotics:  IV vancomycin, pharmacy to dose - TENTATIVE PLAN (pending enterococcus susceptibilities)       3) Therapy Duration:  approx 3-4 weeks and will " re-evaluate clinically (could be extended to 6 weeks)    Estimated end date of IV antibiotics: 2/5/23    4) Outpatient Weekly Labs:    Order the following labs to be drawn on Mondays:    CBC   CMP    CRP   Vancomycin trough. Target 15-20    If discharged on vancomycin IV, order the following additional labs to be drawn on Thursdays:   CMP    Vancomycin trough. Target 15-20    If vancomycin trough is not at target (15-20) prior to discharge, schedule vancomycin trough to be drawn before their fourth outpatient dose.    5) Fax Lab Results to Infectious Diseases Provider: Dr Zavala    [facility] Fax Number: [phone]    6) Outpatient Infectious Diseases Follow-up     Follow-up appointment will be arranged by the ID clinic and will be found in the patient's appointments tab.     Prior to discharge, please ensure the patient's follow-up has been scheduled.     If there is still no follow-up scheduled prior to discharge, please send an EPIC message to Leidy Brooks in Infectious Diseases.        Notably pt reporting vomiting with abx, but per nursing she hasn't had any vomiting    Discussed with primary team and sw              Thank you for your consult. I will follow-up with patient. Please contact us if you have any additional questions.    Beverly Zavala MD  Infectious Disease  [facility] - Med Surg    Subjective:     Principal Problem: Diabetic ulcer of left midfoot associated with type 2 diabetes mellitus, limited to breakdown of skin    HPI:   52M with h/o DM with charcot foot, DVT on Eliquis, PAD, tobacco abuse admitted  1/9 with worsening L leg swelling. Notably she has had several admits over the past several months for L foot infections. Reports wound getting bigger. Denies f/c. Reports compliance with pressure offloading (uses scooter to get around). Prior MRI without OM and was treated with po abx inculding cefadroxil and po bactrim. Says she completed bactrim    Bcx NGTD    MRI from 1/10-  "  Soft tissue ulceration and edema could represent cellulitis.  See above comments.  No focal abscess or acute osteomyelitis is detected.  Recommend follow-up.    Podiatry sent wound swab  Specimen Information: Foot, Left; Wound    0 Result Notes  Component 2 d ago    Aerobic Bacterial Culture  Abnormal   STAPHYLOCOCCUS AUREUS   Moderate   Susceptibility pending           Per podiatry "Personally believe patient would benefit from longer course of IV abx and placement in LTAC or SNF"    JEYSON with  hemodynamically significant stenosis in the left and DPA. Multifocal mild to moderate grade stenoses is suspected throughout the left MIRACLE and, bilateral posterior tibial and peroneal arteris.    Vascular following     Note pt with PCN allergy, but has tolerated keflex and ancef during prior admissions    Smokes cigarettes    ID consulted for "abx recs for discharge. Thanks"      Interval history: NAEO. Complaining of nausea/vomiting with the antibiotics, unsure which one. Working with PT.    Past Surgical History:   Procedure Laterality Date    ABDOMINAL SURGERY  2010    gastric sleeve    BILATERAL OOPHORECTOMY Bilateral 1/12/2015    CHOLECYSTECTOMY      DEBRIDEMENT OF FOOT Bilateral 5/10/2022    Procedure: DEBRIDEMENT, FOOT;  Surgeon: Maira De Los Santos DPM;  Location: [facility] OR;  Service: Podiatry;  Laterality: Bilateral;    Green' s filter Right 7/4/2012    Right Neck & Tunneled Down.    HERNIA REPAIR      "[place] of Hernias Repaires around th Belly Button.", pt. states    INCISION AND DRAINAGE FOOT Left 12/24/2022    Procedure: INCISION AND DRAINAGE, FOOT;  Surgeon: Fahad Razo DPM;  Location: [facility] OR;  Service: Podiatry;  Laterality: Left;    LAPAROSCOPIC CHOLECYSTECTOMY N/A 9/10/2020    Procedure: CHOLECYSTECTOMY, LAPAROSCOPIC;  Surgeon: Montrell Gutierrez MD;  Location: [facility] OR;  Service: General;  Laterality: N/A;  RN PREOP 9/9----COVID Negative  9/9    OVARIAN CYST REMOVAL  3/13/2014    [place] REMOVAL OF " OVARY/TUBE(S)      SPLENECTOMY, TOTAL  July 2003    TONSILLECTOMY      as a child    TYMPANOSTOMY TUBE PLACEMENT  1976    VEIN SURGERY  2003    Lt leg       Review of patient's allergies indicates:   Allergen Reactions    Morphine Other (See Comments)     Patient had a psychotic episode after taking Morphine  Agitation, hallucinations    Penicillins Anaphylaxis     Tolerated cephalosporins in the past    Januvia [sitagliptin] Hives    Carbamazepine Other (See Comments)     hyponatremia       No current facility-administered medications on file prior to encounter.     Current Outpatient Medications on File Prior to Encounter   Medication Sig    acetaminophen (TYLENOL) 500 MG tablet Take 2 tablets (1,000 mg total) by mouth every 6 (six) hours as needed for Pain.    DUPIXENT  mg/2 mL PnIj Inject into the skin.    gabapentin (NEURONTIN) 300 MG capsule TAKE 2 CAPSULES(600 MG) BY MOUTH TWICE DAILY    lisinopriL 10 MG tablet Take 1 tablet (10 mg total) by mouth once daily.    loratadine (CLARITIN) 10 mg tablet Take 1 tablet (10 mg total) by mouth once daily.    metFORMIN (GLUCOPHAGE) 1000 MG tablet Take 1 tablet (1,000 mg total) by mouth 2 (two) times daily with meals.    methocarbamoL (ROBAXIN) 500 MG Tab Take 500 mg by mouth. Frequency could not be confirmed.    metoprolol tartrate (LOPRESSOR) 25 MG tablet Take 1 tablet (25 mg total) by mouth 2 (two) times daily.    multivitamin Tab Take 1 tablet by mouth once daily.    pantoprazole (PROTONIX) 40 MG tablet Take 1 tablet (40 mg total) by mouth once daily.    pravastatin (PRAVACHOL) 40 MG tablet TAKE 1 TABLET(40 MG) BY MOUTH EVERY EVENING    risperiDONE (RISPERDAL M-TABS) 3 MG disintegrating tablet Take 1 tablet (3 mg total) by mouth 2 (two) times daily.    semaglutide (OZEMPIC) 1 mg/dose (4 mg/3 mL) Inject 1 mg into the skin every 7 days.    traMADoL (ULTRAM) 50 mg tablet Take 1 tablet (50 mg total) by mouth every 8 (eight) hours as needed for  Pain.    VYVANSE 40 mg Cap Take 40 mg by mouth once daily.    albuterol (PROVENTIL/VENTOLIN HFA) 90 mcg/actuation inhaler INHALE 2 PUFFS INTO THE LUNGS EVERY 6 HOURS AS NEEDED FOR WHEEZING. RESCUE    albuterol-ipratropium (DUO-NEB) 2.5 mg-0.5 mg/3 mL nebulizer solution Take 3 mLs by nebulization every 6 (six) hours as needed for Wheezing or Shortness of Breath. Rescue    ammonium lactate (LAC-HYDRIN) 12 % lotion APPL Y ONCE TOPICALLY TWICE DAILY FOR 30 DAYS    apixaban (ELIQUIS) 5 mg Tab Take 1 tablet (5 mg total) by mouth 2 (two) times daily.    aspirin 81 MG Chew Take 1 tablet (81 mg total) by mouth once daily.    bumetanide (BUMEX) 1 MG tablet Take 1 tablet (1 mg total) by mouth once daily.    divalproex (DEPAKOTE) 500 MG TbEC Take 1 tablet (500 mg total) by mouth once daily. PO QAM (Patient taking differently: Take 1,500 mg by mouth every evening. PO QAM)    fluticasone propionate (FLONASE) 50 mcg/actuation nasal spray 2 sprays (100 mcg total) by Each Nostril route daily as needed (Nasal congestion).    fluticasone-salmeterol diskus inhaler 250-50 mcg Inhale 1 puff into the lungs 2 (two) times daily. Controller    hydrOXYzine (ATARAX) 50 MG tablet Take 50 mg by mouth 4 (four) times daily as needed.    nystatin (NYSTOP) powder APPLY TO ABDOMINAL AND BREAST SKIN FOLD TWICE DAILY.    [DISCONTINUED] diclofenac sodium (VOLTAREN) 1 % Gel Apply 2 g topically 4 (four) times daily as needed (Apply to painful area up to 4 times a day as needed for pain). Apply to painful area 4 times a day as needed for pain    [DISCONTINUED] furosemide (LASIX) 20 MG tablet TAKE 1 TABLET(20 MG) BY MOUTH EVERY DAY    [DISCONTINUED] QUEtiapine (SEROQUEL) 200 MG Tab Take 1 tablet (200 mg total) by mouth before breakfast.     Family History       Problem Relation (Age of Onset)    Cataracts Father    Diabetes Father, Paternal Grandfather    Heart disease Father, Paternal Grandfather    Hypertension Father    No Known Problems  Mother, Sister, Brother, Maternal Aunt, Maternal Uncle, Paternal Aunt, Paternal Uncle, Maternal Grandfather    Ovarian cancer Maternal Grandmother, Paternal Grandmother          Tobacco Use    Smoking status: Every Day     Packs/day: 1.00     Years: 37.00     Pack years: 37.00     Types: Cigarettes     Last attempt to quit: 2020     Years since quittin.1    Smokeless tobacco: Never    Tobacco comments:     Enrolled in the [email or web address] on 5/3/14 ([facility] Member ID # [id number]). Ambulatory referral to Smoking Cessation Program   Substance and Sexual Activity    Alcohol use: No     Alcohol/week: 0.0 standard drinks    Drug use: No    Sexual activity: Yes     Partners: Male     Review of Systems   Constitutional:  Positive for activity change, appetite change and fever. Negative for chills.   HENT:  Positive for congestion. Negative for sinus pressure, sinus pain and trouble swallowing.    Respiratory:  Negative for cough, chest tightness and shortness of breath.    Cardiovascular:  Positive for leg swelling. Negative for chest pain and palpitations.   Gastrointestinal:  Negative for abdominal pain, constipation, diarrhea, nausea and vomiting.   Endocrine: Positive for polyuria.   Genitourinary:  Negative for difficulty urinating.   Musculoskeletal:  Negative for arthralgias and myalgias.   Skin:  Positive for color change, rash and wound.   Neurological:  Positive for numbness. Negative for weakness and headaches.   Hematological:  Negative for adenopathy.   Psychiatric/Behavioral:  Negative for confusion.    Objective:     Vital Signs (Most Recent):  Temp: 99.5 °F (37.5 °C) (23 1058)  Pulse: (!) 115 (23 1058)  Resp: 20 (23 1058)  BP: 139/68 (23 1058)  SpO2: 95 % (23 1058)   Vital Signs (24h Range):  Temp:  [98 °F (36.7 °C)-99.5 °F (37.5 °C)] 99.5 °F (37.5 °C)  Pulse:  [] 115  Resp:  [17-20] 20  SpO2:  [93 %-96 %] 95 %  BP: (125-144)/(61-73) 139/68     Weight: 107 kg  (235 lb 14.3 oz)  Body mass index is 38.07 kg/m².    Physical Exam  Vitals and nursing note reviewed.   Constitutional:       General: She is not in acute distress.     Appearance: She is obese. She is not ill-appearing, toxic-appearing or diaphoretic.   HENT:      Head: Normocephalic and atraumatic.      Nose: Nose normal.      Mouth/Throat:      Mouth: Mucous membranes are moist.   Eyes:      General: No scleral icterus.     Conjunctiva/sclera: Conjunctivae normal.   Cardiovascular:      Rate and Rhythm: Normal rate and regular rhythm.      Pulses: Normal pulses.      Heart sounds: Normal heart sounds. No murmur heard.    No gallop.   Pulmonary:      Effort: Pulmonary effort is normal. No respiratory distress.      Breath sounds: Normal breath sounds. No wheezing or rales.      Comments: Room air  Abdominal:      General: Bowel sounds are normal. There is no distension.      Palpations: Abdomen is soft. There is no mass.      Tenderness: There is no abdominal tenderness. There is no guarding.   Musculoskeletal:      Right lower leg: No edema.      Left lower leg: No edema.      Comments: L foot bandaged   Skin:     General: Skin is warm and dry.      Findings: Erythema (left lower leg) and rash present.   Neurological:      Mental Status: She is alert and oriented to person, place, and time.      Sensory: Sensory deficit (bilateral feet) present.      Motor: No weakness.                       Significant Labs: All pertinent labs within the past 24 hours have been reviewed.    Significant Imaging: I have reviewed all pertinent imaging results/findings within the past 24 hours.

## 2023-01-12 NOTE — PT/OT/SLP EVAL
Physical Therapy Evaluation    Patient Name:  Audrey Natarajan   MRN:  [id number]    Recommendations:     Discharge Recommendations: LTACH (long-term acute care hospital)   Discharge Equipment Recommendations: none (LTAC)       Assessment:     Audrey Natarajan is a 52 y.o. female admitted with a medical diagnosis of Diabetic ulcer of left midfoot associated with type 2 diabetes mellitus, limited to breakdown of skin.  She presents with the following impairments/functional limitations: wound (L) foot; decreased functional mobility.    Rehab Prognosis: Good; patient would benefit from acute skilled PT services to address these deficits and reach maximum level of function.    Recent Surgery: * No surgery found *      Plan:     During this hospitalization, patient to be seen   to address the identified rehab impairments via   and progress toward the following goals:    Plan of Care Expires:  01/12/23    Subjective     Chief Complaint: None  Patient/Family Comments/goals: To get (L) foot healed.  Pain/Comfort:  Pain Rating 1: 8/10  Location - Side 1: Left  Location 1: foot  Pain Addressed 1: Reposition    Patients cultural, spiritual, Mormonism conflicts given the current situation: no    Living Environment:  PTA pt lived alone in a trailer with ramp entry.  Prior to admission, patients level of function was mod I.  Equipment used at home: wheelchair (knee scooter).  DME owned (not currently used): none.  Upon discharge, patient will have assistance from facility.    Objective:     Communicated with nurseLuci prior to session.  Patient found up in chair with  (no attachments)  upon PT entry to room.    General Precautions: Standard,    Orthopedic Precautions:LLE non weight bearing   Braces: N/A  Respiratory Status: Room air    Exams:  Cognitive Exam:  Patient is oriented to Person, Place, Time, and Situation  RLE ROM: WFL  RLE Strength: WFL  LLE ROM: WFL except ankle NT  LLE Strength: WFL except ankle  NT    Functional Mobility:  Transfers:     Sit to Stand:  supervision with rolling walker  Gait: 5' forward/backwards w/RW SBA, NWB LLE.  Balance: Fair+ static/Fair dynamic      AM-PAC 6 CLICK MOBILITY  Total Score:21       Treatment & Education:  Educated on role of PT and POC, demonstrated safety with gait using RW and NWB LLE.    Patient left up in chair with call button in reach and all needs in reach .    GOALS:   Multidisciplinary Problems       Physical Therapy Goals          Problem: Physical Therapy    Goal Priority Disciplines Outcome Goal Variances Interventions   Physical Therapy Goal     PT, PT/OT Adequate for Care Transition                         History:     Past Medical History:   Diagnosis Date    ADHD (attention deficit hyperactivity disorder)     Arthritis     Asthma     Bipolar 1 disorder     Cataract     Cigarette smoker     COPD (chronic obstructive pulmonary disease)     Coronary artery disease     A fib    Depression     bipolar manic depresson    Diabetes mellitus     Diabetic foot ulcers     Diabetic neuropathy     DVT of lower extremity, bilateral 07/2013    bilateral LE DVT. [place] filter placed.     Encounter for blood transfusion     History of blood clots 1. Left Leg=2003; 2.Bilateral Groin=Blood Clots= 5 or 6/ 2013 & 7/2013; 3. LLL of Lung=7/2013;  4. Lt. Lower Leg=7/2013.     Pt. had 1st Blood Clot after Edvczzljykxb=[id number], & Last=2013. [place] Filter= Rt.Lateral Neck.    HTN (hypertension) 06/06/2013    Pt states that she does not have hypertension    Hypercholesteremia     Irregular heartbeat     Neuromuscular disorder     neuropathy feet    Obese     PE (pulmonary embolism) 07/2013    bilat LE DVT.     Restless leg syndrome        Past Surgical History:   Procedure Laterality Date    ABDOMINAL SURGERY  2010    gastric sleeve    BILATERAL OOPHORECTOMY Bilateral 1/12/2015    CHOLECYSTECTOMY      DEBRIDEMENT OF FOOT Bilateral 5/10/2022    Procedure: DEBRIDEMENT, FOOT;   "Surgeon: Maira De Los Santos DPM;  Location: [facility] OR;  Service: Podiatry;  Laterality: Bilateral;    Green' s filter Right 7/4/2012    Right Neck & Tunneled Down.    HERNIA REPAIR      "[place] of Hernias Repaires around th Belly Button.", pt. states    INCISION AND DRAINAGE FOOT Left 12/24/2022    Procedure: INCISION AND DRAINAGE, FOOT;  Surgeon: Fahad Razo DPM;  Location: [facility] OR;  Service: Podiatry;  Laterality: Left;    LAPAROSCOPIC CHOLECYSTECTOMY N/A 9/10/2020    Procedure: CHOLECYSTECTOMY, LAPAROSCOPIC;  Surgeon: Montrell Gutierrez MD;  Location: [facility] OR;  Service: General;  Laterality: N/A;  RN PREOP 9/9----COVID Negative  9/9    OVARIAN CYST REMOVAL  3/13/2014    [place] REMOVAL OF OVARY/TUBE(S)      SPLENECTOMY, TOTAL  July 2003    TONSILLECTOMY      as a child    TYMPANOSTOMY TUBE PLACEMENT  1976    VEIN SURGERY  2003    Lt leg       Time Tracking:     PT Received On: 01/12/23  PT Start Time: 1415     PT Stop Time: 1439  PT Total Time (min): 24 min     Billable Minutes: Evaluation 15 and Gait Training 9      01/12/2023  "

## 2023-01-12 NOTE — NURSING
Patient sitting up in chair, respirations e/u, denies pain, asking for food with blood sugarof 300's. Educated patient on healthier nutrition options and its relationship with diabetes especially with wound healing.  Patient has no s/s of distress.Reinforced use of call light for needs.Safety maintained, will continue to  monitor.

## 2023-01-12 NOTE — ASSESSMENT & PLAN NOTE
Admitted with worsening L plantar DM foot wound. S/p I&D in 12/2022 with bone cultures no growth and treated for skin/soft tissue infection with bactrim x10 days. Worsening with enlarging wound and cellulitis involving the foot and the leg.   - On vanc/ceftriaxone  - Podiatry consulted     Patient's FSGs are uncontrolled due to hyperglycemia on current medication regimen.  Last A1c reviewed-   Lab Results   Component Value Date    HGBA1C 7.1 (H) 12/22/2022     Most recent fingerstick glucose reviewed-   Recent Labs   Lab 01/11/23  1111 01/11/23  1628 01/11/23  2009 01/12/23  0702   POCTGLUCOSE 167* 200* 330* 134*     Current correctional scale  Low  Maintain anti-hyperglycemic dose as follows-   Antihyperglycemics (From admission, onward)    Start     Stop Route Frequency Ordered    01/09/23 2100  insulin detemir U-100 pen 10 Units         -- SubQ Nightly 01/09/23 1620    01/09/23 1730  insulin aspart U-100 pen 3 Units         -- SubQ 3 times daily with meals 01/09/23 1620    01/09/23 1719  insulin aspart U-100 pen 0-5 Units         -- SubQ Before meals & nightly PRN 01/09/23 1620        Hold Oral hypoglycemics while patient is in the hospital.  Podiatry consulted. Blood cultures are NG  Wound with Staph. ID consulted for recs for discharge    MRSA and Enteroccocus on wound cultures. Follow podiatry/ID recs.

## 2023-01-12 NOTE — NURSING
Report received and care assumed. Discussed plan of care and safety with patient . Reviewed call system. No acute distress noted  Remains on contact Isolation related to MSRA in wound . Repeat culture MRSA in wound confirmed. Vancomycin infusing

## 2023-01-12 NOTE — PT/OT/SLP EVAL
Occupational Therapy   Evaluation and Discharge Note    Name: Audrey Natarajan  MRN: [id number]  Admitting Diagnosis: Diabetic ulcer of left midfoot associated with type 2 diabetes mellitus, limited to breakdown of skin  Recent Surgery: * No surgery found *      Recommendations:     Discharge Recommendations: home (with caregiver support)  Discharge Equipment Recommendations: bedside commode  Barriers to discharge:  None    Assessment:     Audrey Natarajan is a 52 y.o. female with a medical diagnosis of Diabetic ulcer of left midfoot associated with type 2 diabetes mellitus, limited to breakdown of skin. Pt is MOD I stand pivot chair<>BSC with LLE NWB after education and therapy demo. OT rec BSC at d/c to promote safety with ADL and for compliance for LLE NWB. Pt reports using a bucket d/t inability to get to bathroom at opposite end of wide trailer in time.     Plan:     During this hospitalization, patient does not require further acute OT services.  Please re-consult if situation changes.    Plan of Care Reviewed with: patient    Subjective     Chief Complaint: pain traveling up her LLE   Patient/Family Comments/goals: pt reports urinating in a bucket at home     Occupational Profile:  Living Environment: Lives alone in her trailer, with a ramp at entry, handicap accessible bathroom, HR on bed  Previous level of function: MOD I for functional mobility using w/c vs knee scooter, MOD I with ADLs from seated position  Roles and Routines: minimal driving to appts only; does not cook- sister brings over meals weekly; weekly assist with home maintenance (hired help); goes to wound care weekly  Equipment Used at home: bath bench, nebulizer, wheelchair, walker, rolling (knee scooter)  Assistance upon Discharge: sister, neighbors, and hired help     Pain/Comfort:  Pain Rating 1: 7/10  Location - Side 1: Left  Location - Orientation 1: generalized  Location 1: leg  Pain Addressed 1: Reposition, Distraction, Cessation  of Activity, Pre-medicate for activity    Patients cultural, spiritual, Mandaen conflicts given the current situation: no    Objective:     Patient found up in chair reclined with BLE elevated by 2 pillows with peripheral IV upon OT entry to room.    General Precautions: Standard, contact, fall, diabetic  Orthopedic Precautions: LLE non weight bearing  Braces: N/A  Respiratory Status: Room air     Occupational Performance:    Bed Mobility:    NT: Pt found/left in reclined chair.     Functional Mobility/Transfers:  Patient completed Sit <> Stand Transfer with modified independence  with  rolling walker   Patient completed Toilet Transfer Step Transfer technique with modified independence with  rolling walker and bedside commode; then stand pivot t/f with BSC using no AD  Functional Mobility: Gait belt donned before function mobility activities. Pt required initial OT demo for safety with LLE NWB, then pt showed good awareness of LLE NWB to practice BSC transfers. Since pt is completing t/f in the room without nursing supervision, OT had pt demo stand pivot without RW to ensure safe compliance and safe stand pivot transfer which pt did MOD I.    Activities of Daily Living:  Lower Body Dressing: independent with donning/doffing R sock seated     Cognitive/Visual Perceptual:  Cognitive/Psychosocial Skills:     -       Follows Commands/attention:Follows multistep  commands  -       Communication: clear/fluent  -       Memory: No Deficits noted  -       Safety awareness/insight to disability: intact   -       Mood/Affect/Coping skills/emotional control: Cooperative    Physical Exam:  Balance:   -       Seated: Independent, Standing: MOD I with RW   Skin integrity: wound plantar aspect L foot (dressed)  Edema:  edema to left foot  Sensation:    -       Intact: light touch to BUE; however, pt reports intermittent numbness to B/L digits  Upper Extremity Range of Motion:     -       Right Upper Extremity: WFL  -       Left  Upper Extremity: WFL   Upper Extremity Strength:    -       Right Upper Extremity: WFL  -       Left Upper Extremity: WFL   Strength:    -       Right Upper Extremity: WFL  -       Left Upper Extremity: WFL  Fine Motor Coordination:    -       Intact  Left hand, manipulation of objects and Right hand, manipulation of objects  Gross motor coordination:   WFL    AMPAC 6 Click ADL:  AMPAC Total Score: 24    Treatment & Education:  Edu on OT role/end of POC.   Pt completed LBD by don/doff RLE sock independently.   Pt completed functional mobility activities from the chair to the bedside commode using RW with LLE NWB compliance after demo/ max edu for importance for healing process.   Pt focused on wanting a rollator; discussed that this DME is not a safe option and a high fall risk d/t LLE NWB as OT demo'd to pt; pt reported understanding. Edu on removing clutter from within home environment.   Pt with hand-held mirror at bedside; encouraged pt to use this to monitor extremities (UE and LE) daily for skin inspection as she reports diminished sensation; pt demo'd good hip flexibility while seated to use mirror for skin inspection   All questions answered within OT scope of practice       Patient left reclined up in chair with B/L LE heels offloaded by pillow with bedside table in front of pt, all lines intact and nurse, Luci, notified. BSC within reach for pt to use.    GOALS:   Multidisciplinary Problems       Occupational Therapy Goals          Problem: Occupational Therapy    Goal Priority Disciplines Outcome Interventions   Occupational Therapy Goal     OT, PT/OT Adequate for Care Transition    Description:                          History:     Past Medical History:   Diagnosis Date    ADHD (attention deficit hyperactivity disorder)     Arthritis     Asthma     Bipolar 1 disorder     Cataract     Cigarette smoker     COPD (chronic obstructive pulmonary disease)     Coronary artery disease     A fib     "Depression     bipolar manic depresson    Diabetes mellitus     Diabetic foot ulcers     Diabetic neuropathy     DVT of lower extremity, bilateral 07/2013    bilateral LE DVT. [place] filter placed.     Encounter for blood transfusion     History of blood clots 1. Left Leg=2003; 2.Bilateral Groin=Blood Clots= 5 or 6/ 2013 & 7/2013; 3. LLL of Lung=7/2013;  4. Lt. Lower Leg=7/2013.     Pt. had 1st Blood Clot after [id number], & Last=2013. [place] Filter= Rt.Lateral Neck.    HTN (hypertension) 06/06/2013    Pt states that she does not have hypertension    Hypercholesteremia     Irregular heartbeat     Neuromuscular disorder     neuropathy feet    Obese     PE (pulmonary embolism) 07/2013    bilat LE DVT.     Restless leg syndrome          Past Surgical History:   Procedure Laterality Date    ABDOMINAL SURGERY  2010    gastric sleeve    BILATERAL OOPHORECTOMY Bilateral 1/12/2015    CHOLECYSTECTOMY      DEBRIDEMENT OF FOOT Bilateral 5/10/2022    Procedure: DEBRIDEMENT, FOOT;  Surgeon: Maira De Los Santos DPM;  Location: [facility] OR;  Service: Podiatry;  Laterality: Bilateral;    Green' s filter Right 7/4/2012    Right Neck & Tunneled Down.    HERNIA REPAIR      "[place] of Hernias Repaires around th [place].", pt. states    INCISION AND DRAINAGE FOOT Left 12/24/2022    Procedure: INCISION AND DRAINAGE, FOOT;  Surgeon: Fahad Razo DPM;  Location: [facility] OR;  Service: Podiatry;  Laterality: Left;    LAPAROSCOPIC CHOLECYSTECTOMY N/A 9/10/2020    Procedure: CHOLECYSTECTOMY, LAPAROSCOPIC;  Surgeon: Montrell Gutierrez MD;  Location: [facility] OR;  Service: General;  Laterality: N/A;  RN PREOP 9/9----COVID Negative  9/9    OVARIAN CYST REMOVAL  3/13/2014    MD REMOVAL OF OVARY/TUBE(S)      SPLENECTOMY, TOTAL  July 2003    TONSILLECTOMY      as a child    TYMPANOSTOMY TUBE PLACEMENT  1976    VEIN SURGERY  2003    Lt leg       Time Tracking:     OT Date of Treatment: 01/12/23  OT Start Time: 0341  OT Stop Time: 0402  OT Total " Time (min): 21 min    Billable Minutes:Evaluation 21 min      1/12/2023

## 2023-01-12 NOTE — PLAN OF CARE
SW discussed discharge planning with patient. Patient informed SW that she would like to go home with home health and IV abx. SW informed patient that she will see if there is a home health agency in network that will go to [place]. Patient stated that if there isn't, she will go to LTAC in [place]. SW sent referral to [facility].     1:55 pm     Spoke with Roselia at [facility]. [facility] not in network.     SW notified patient. Patient that she can go to LTAC in [place].       01/12/23 1331   Discharge Reassessment   Assessment Type Discharge Planning Reassessment   Did the patient's condition or plan change since previous assessment? No   Discharge Plan discussed with: Patient   Communicated HUMBERTO with patient/caregiver Yes   Discharge Plan A Home Health   Discharge Plan B Long-term acute care facility (LTAC)   DME Needed Upon Discharge  none   Discharge Barriers Identified None   Why the patient remains in the hospital Requires continued medical care   Post-Acute Status   Post-Acute Authorization Home Health   Home Health Status Pending post-acute provider review/more information requested   Coverage Medicaid   Discharge Delays (!) Post-Acute Set-up

## 2023-01-12 NOTE — PROGRESS NOTES
Pharmacokinetic Assessment Follow Up: IV Vancomycin    Vancomycin serum concentration assessment(s):    The trough level was drawn correctly and can be used to guide therapy at this time. The measurement is within the desired definitive target range of 15 to 20 mcg/mL.    Vancomycin Regimen Plan:    Continue regimen to Vancomycin 2000 mg IV every 12 hours with next serum trough concentration measured at 0400 prior to 3rd dose on 1/13/2023    Drug levels (last 3 results):  Recent Labs   Lab Result Units 01/11/23  0518 01/12/23  0440   Vancomycin-Trough ug/mL 15.3 18.7       Pharmacy will continue to follow and monitor vancomycin.    Please contact pharmacy at extension [phone] for questions regarding this assessment.    Thank you for the consult,   Ja Carroll Jr       Patient brief summary:  Audrey Natarajan is a 52 y.o. female initiated on antimicrobial therapy with IV Vancomycin for treatment of skin & soft tissue infection    Drug Allergies:   Review of patient's allergies indicates:   Allergen Reactions    Morphine Other (See Comments)     Patient had a psychotic episode after taking Morphine  Agitation, hallucinations    Penicillins Anaphylaxis     Tolerated cephalosporins in the past    Januvia [sitagliptin] Hives    Carbamazepine Other (See Comments)     hyponatremia       Actual Body Weight:   107 kg    Renal Function:   Estimated Creatinine Clearance: 138.8 mL/min (based on SCr of 0.6 mg/dL).,     Dialysis Method (if applicable):  N/A    CBC (last 72 hours):  Recent Labs   Lab Result Units 01/09/23  1206 01/10/23  0531 01/11/23  0518 01/12/23  0440   WBC K/uL 14.57* 10.17 10.52 9.77   Hemoglobin g/dL 9.2* 8.9* 10.3* 10.1*   Hematocrit % 28.9* 27.4* 32.1* 31.8*   Platelets K/uL 872* 751* 832* 744*   Gran % % 48.2 31.8* 39.3 35.6*   Lymph % % 34.7 49.5* 42.8 45.5   Mono % % 12.2 11.8 10.2 12.0   Eosinophil % % 3.4 5.4 6.0 4.8   Basophil % % 0.9 1.1 1.0 1.2   Differential Method  Automated Automated  Automated Automated       Metabolic Panel (last 72 hours):  Recent Labs   Lab Result Units 01/09/23  1206 01/10/23  0531 01/11/23  0518 01/12/23  0440   Sodium mmol/L 132* 132* 136 133*   Potassium mmol/L 4.7 5.2* 5.0 4.7   [place] mmol/L 94* 98 100 98   CO2 mmol/L 28 30* 26 27   Glucose mg/dL 106 143* 211* 148*   BUN mg/dL 14 17 14 11   Creatinine mg/dL 0.7 0.7 0.7 0.6   Albumin g/dL 3.1*  --   --   --    Total Bilirubin mg/dL 0.2  --   --   --    Alkaline Phosphatase U/L 75  --   --   --    AST U/L 14  --   --   --    ALT U/L 6*  --   --   --        Vancomycin Administrations:  vancomycin given in the last 96 hours                     vancomycin (VANCOCIN) 2,000 mg in dextrose 5 % 500 mL IVPB (mg) 2,000 mg New Bag 01/12/23 0726     2,000 mg New Bag 01/11/23 1755     2,000 mg New Bag  0611      Restarted 01/10/23 1700     2,000 mg New Bag  0103    vancomycin 1.5 g in dextrose 5 % 250 mL IVPB (ready to mix) (mg) 1,500 mg New Bag 01/09/23 1315                    Microbiologic Results:  Microbiology Results (last 7 days)       Procedure Component Value Units Date/Time    Blood culture #1 **CANNOT BE ORDERED STAT** [[id number]] Collected: 01/09/23 1205    Order Status: Completed Specimen: Blood from Peripheral, Antecubital, Left Updated: 01/11/23 1503     Blood Culture, Routine No Growth to date      No Growth to date      No Growth to date    Blood culture #2 **CANNOT BE ORDERED STAT** [[id number]] Collected: 01/09/23 1214    Order Status: Completed Specimen: Blood from Peripheral, Antecubital, Left Updated: 01/11/23 1503     Blood Culture, Routine No Growth to date      No Growth to date      No Growth to date    Aerobic culture [[id number]]  (Abnormal) Collected: 01/09/23 1647    Order Status: Completed Specimen: Wound from Foot, Left Updated: 01/11/23 0907     Aerobic Bacterial Culture STAPHYLOCOCCUS AUREUS  Moderate  Susceptibility pending      Gram stain [[id number]] Collected: 01/09/23 0636    Order Status:  Completed Specimen: Wound from Foot, Left Updated: 01/10/23 0803     Gram Stain Result No WBC's      No organisms seen

## 2023-01-12 NOTE — PLAN OF CARE
Problem: Occupational Therapy  Goal: Occupational Therapy Goal  Description:     Outcome: Adequate for Care Transition    Initial OT order received. Pt is MOD I stand pivot chair<>BSC with LLE NWB after education and therapy demo. OT rec home with BSC at d/c. No further OT needs; nurse updated. Pt agreeable with end of OT POC. Please re-consult if situation changes. Thank you.

## 2023-01-12 NOTE — SUBJECTIVE & OBJECTIVE
Interval History:  No new issues   Review of Systems   Constitutional:  Positive for activity change. Negative for chills, diaphoresis, fatigue and fever.   HENT:  Negative for congestion and dental problem.    Eyes:  Negative for discharge and itching.   Respiratory:  Negative for apnea.    Cardiovascular:  Negative for chest pain.   Gastrointestinal:  Negative for abdominal pain.   Genitourinary:  Negative for difficulty urinating and dyspareunia.   Skin:  Negative for color change and pallor.   Neurological:  Negative for facial asymmetry.   Psychiatric/Behavioral:  Negative for agitation and behavioral problems.    Objective:     Vital Signs (Most Recent):  Temp: 98 °F (36.7 °C) (01/12/23 0700)  Pulse: 87 (01/12/23 0700)  Resp: 18 (01/12/23 0700)  BP: (!) 142/69 (01/12/23 0700)  SpO2: (!) 94 % (01/12/23 0700)   Vital Signs (24h Range):  Temp:  [98 °F (36.7 °C)-99 °F (37.2 °C)] 98 °F (36.7 °C)  Pulse:  [77-95] 87  Resp:  [17-20] 18  SpO2:  [93 %-96 %] 94 %  BP: (125-144)/(61-73) 142/69     Weight: 107 kg (235 lb 14.3 oz)  Body mass index is 38.07 kg/m².    Intake/Output Summary (Last 24 hours) at 1/12/2023 0824  Last data filed at 1/12/2023 0445  Gross per 24 hour   Intake 1053 ml   Output 1950 ml   Net -897 ml      Physical Exam  Vitals and nursing note reviewed.   Constitutional:       General: She is not in acute distress.     Appearance: Normal appearance. She is obese. She is not ill-appearing or toxic-appearing.   HENT:      Head: Normocephalic and atraumatic.      Nose: Nose normal.      Mouth/Throat:      Pharynx: Oropharynx is clear.   Eyes:      Conjunctiva/sclera: Conjunctivae normal.   Cardiovascular:      Rate and Rhythm: Normal rate and regular rhythm.   Pulmonary:      Effort: Pulmonary effort is normal. No respiratory distress.   Skin:     General: Skin is dry.   Neurological:      Mental Status: She is alert and oriented to person, place, and time.   Psychiatric:         Mood and Affect: Mood  normal.         Behavior: Behavior normal.       Significant Labs: All pertinent labs within the past 24 hours have been reviewed.  BMP:   Recent Labs   Lab 01/12/23  0440   *   *   K 4.7   CL 98   CO2 27   BUN 11   CREATININE 0.6   CALCIUM 9.4     CBC:   Recent Labs   Lab 01/11/23 0518 01/12/23 0440   WBC 10.52 9.77   HGB 10.3* 10.1*   HCT 32.1* 31.8*   * 744*       Significant Imaging:

## 2023-01-12 NOTE — PROGRESS NOTES
[facility] Surg  Podiatry  Progress Note    Patient Name: Audrey Natarajan  MRN: [id number]  Admission Date: 1/9/2023  Hospital Length of Stay: 3 days  Attending Physician: Micha Grossman MD  Primary Care Provider: Donaldo Pena MD     Subjective:     History of Present Illness: 53 y/o female PMH DM2, Charcot left foot admitted for wound infection left foot. Patient seen today by Dr. Mata who sent patient to ED for further eval. H/o left foot I&D 12/24/22. Patient reports nausea. Last seen in wound care on 1/6/23.    Interval History:   1/10/2023: Patient seen in hospital room,  at bedside.  Relates pain is primarily to posterior left knee radiating to groin but also has foot and anterior leg pain.  Relates some improvement on admission.  She sates that secondary to knee pain she had not been able to properly offload charcot foot.     1/11/23: Patient seen bedside sitting in chair. Strike through noted to bandage.       Scheduled Meds:   apixaban  5 mg Oral BID    bumetanide  1 mg Oral Daily    ceFEPime (MAXIPIME) IVPB  2 g Intravenous Q8H    divalproex  1,500 mg Oral QHS    ferrous sulfate  1 tablet Oral Daily    gabapentin  600 mg Oral BID    insulin aspart U-100  3 Units Subcutaneous TIDWM    insulin detemir U-100  10 Units Subcutaneous QHS    LIDOcaine  1 patch Transdermal Q24H    miconazole   Topical (Top) BID    mupirocin   Nasal BID    nicotine  1 patch Transdermal Daily    pravastatin  40 mg Oral QHS    risperiDONE  3 mg Oral BID    senna-docusate 8.6-50 mg  1 tablet Oral BID    sodium chloride 0.9%  10 mL Intravenous Q8H    sodium chloride 0.9%  10 mL Intravenous Q6H    vancomycin (VANCOCIN) IVPB  2,000 mg Intravenous Q12H     Continuous Infusions:  PRN Meds:acetaminophen, dextrose 10%, dextrose 10%, glucagon (human recombinant), glucose, glucose, insulin aspart U-100, melatonin, methocarbamoL, naloxone, ondansetron, oxyCODONE-acetaminophen, prochlorperazine, Flushing PICC  Protocol **AND** sodium chloride 0.9% **AND** sodium chloride 0.9%, traMADoL, Pharmacy to dose Vancomycin consult **AND** vancomycin - pharmacy to dose    Review of Systems   Constitutional:  Positive for activity change. Negative for appetite change, chills, fatigue and fever.   Respiratory:  Negative for cough and shortness of breath.    Cardiovascular:  Positive for leg swelling. Negative for chest pain.   Gastrointestinal:  Negative for diarrhea, nausea and vomiting.   Musculoskeletal:  Positive for arthralgias and myalgias.   Skin:  Positive for wound.   Neurological:  Positive for numbness. Negative for weakness.        + paresthesia      Objective:     Vital Signs (Most Recent):  Temp: 98.3 °F (36.8 °C) (01/12/23 0505)  Pulse: 84 (01/12/23 0505)  Resp: 18 (01/12/23 0505)  BP: 125/61 (01/12/23 0505)  SpO2: (!) 93 % (01/12/23 0505)   Vital Signs (24h Range):  Temp:  [98.3 °F (36.8 °C)-99 °F (37.2 °C)] 98.3 °F (36.8 °C)  Pulse:  [77-95] 84  Resp:  [17-20] 18  SpO2:  [93 %-96 %] 93 %  BP: (125-152)/(61-73) 125/61     Weight: 107 kg (235 lb 14.3 oz)  Body mass index is 38.07 kg/m².    Physical Exam  Nursing note reviewed.   Constitutional:       General: She is not in acute distress.     Appearance: She is not toxic-appearing or diaphoretic.   Cardiovascular:      Pulses:           Dorsalis pedis pulses are 1+ on the right side and 1+ on the left side.        Posterior tibial pulses are 2+ on the right side and 2+ on the left side.   Pulmonary:      Effort: No respiratory distress.   Musculoskeletal:      Right lower leg: Edema present.      Left lower leg: Edema present.      Right ankle: Swelling present. No lateral malleolus, medial malleolus, AITF ligament, CF ligament or posterior TF ligament tenderness. Decreased range of motion.      Right Achilles Tendon: No defects. Paniagua's test negative.      Left ankle: Swelling present. No lateral malleolus, medial malleolus, AITF ligament, CF ligament, posterior TF  ligament or proximal fibula tenderness. Decreased range of motion.      Left Achilles Tendon: No defects. Paniagua's test negative.      Right foot: Swelling and tenderness present.      Left foot: Swelling, Charcot foot and tenderness present.      Comments: There is equinus deformity bilateral with decreased dorsiflexion at the ankle joint bilateral     Decreased first MPJ range of motion both weightbearing and nonweightbearing, no crepitus observed the first MP joint, + dorsal flag sign. Mild  bunion deformity is observed .     Patient has hammertoes of digits 2-5 bilateral partially reducible without symptom today.     Skin:     General: Skin is warm and dry.      Coloration: Skin is not pale.      Findings: Erythema and wound (see below) present. No laceration.      Nails: There is no clubbing.   Neurological:      Sensory: No sensory deficit.      Motor: No tremor, atrophy or abnormal muscle tone.      Deep Tendon Reflexes: Reflexes are normal and symmetric.      Comments: Paresthesias, and hyperesthesia bilateral feet at toes with no clearly identified trigger or source.     Psychiatric:         Attention and Perception: She is attentive.         Mood and Affect: Mood  anxious. Affect is not inappropriate.         Speech: She is communicative. Speech is not slurred.         Behavior: Behavior is not combative.        1/11/23:           01/10/2023    Ulcer location: left plantar midfoot/midline  Signs of infection: local edema and erythema  Drainage: Sero-Sanguinous  Purulence: no  Crepitus/fluctuance: no  Periwound: Reddened, Macerated, Calloused  Base: Mixed Granular/Fibrotic  Depth:  bone  Probe to bone: yes          01/09/2023 12/27/2022      Ulcer location: left plantar midfoot/midline  Signs of infection: local edema and erythema  Drainage: Sero-Sanguinous  Purulence: no  Crepitus/fluctuance: no  Periwound: Reddened, Macerated, Calloused  Base: Mixed Granular/Fibrotic  Depth:  bone  Probe to  bone: yes             Laboratory:  A1C:   Recent Labs   Lab 09/08/22  0546 09/20/22  0957 12/22/22  2322   HGBA1C 9.1* 8.6* 7.1*       CBC:   Recent Labs   Lab 01/11/23  0518   WBC 10.52   RBC 4.10   HGB 10.3*   HCT 32.1*   *   MCV 78*   MCH 25.1*   MCHC 32.1       CMP:   Recent Labs   Lab 01/09/23  1206 01/10/23  0531 01/11/23  0518      < > 211*   CALCIUM 9.6   < > 9.3   ALBUMIN 3.1*  --   --    PROT 7.3  --   --    *   < > 136   K 4.7   < > 5.0   CO2 28   < > 26   CL 94*   < > 100   BUN 14   < > 14   CREATININE 0.7   < > 0.7   ALKPHOS 75  --   --    ALT 6*  --   --    AST 14  --   --    BILITOT 0.2  --   --     < > = values in this interval not displayed.       CRP:   Recent Labs   Lab 01/09/23  1206   CRP 38.3*       ESR:   Recent Labs   Lab 01/09/23  1206   SEDRATE 30*       Microbiology Results (last 7 days)       Procedure Component Value Units Date/Time    Blood culture #1 **CANNOT BE ORDERED STAT** [[id number]] Collected: 01/09/23 1205    Order Status: Completed Specimen: Blood from Peripheral, Antecubital, Left Updated: 01/11/23 1503     Blood Culture, Routine No Growth to date      No Growth to date      No Growth to date    Blood culture #2 **CANNOT BE ORDERED STAT** [[id number]] Collected: 01/09/23 1214    Order Status: Completed Specimen: Blood from Peripheral, Antecubital, Left Updated: 01/11/23 1503     Blood Culture, Routine No Growth to date      No Growth to date      No Growth to date    Aerobic culture [[id number]]  (Abnormal) Collected: 01/09/23 1647    Order Status: Completed Specimen: Wound from Foot, Left Updated: 01/11/23 0907     Aerobic Bacterial Culture STAPHYLOCOCCUS AUREUS  Moderate  Susceptibility pending      Gram stain [[id number]] Collected: 01/09/23 1647    Order Status: Completed Specimen: Wound from Foot, Left Updated: 01/10/23 0803     Gram Stain Result No WBC's      No organisms seen            Diagnostic Results:  Imaging Results              MRI Foot  (Midfoot) Left W W/O Contrast (Final result)  Result time 01/10/23 18:22:24      Final result by Lee Rosas MD (01/10/23 18:22:24)                   Impression:      Soft tissue ulceration and edema could represent cellulitis.  See above comments.  No focal abscess or acute osteomyelitis is detected.  Recommend follow-up.      Electronically signed by: Lee Rosas  Date:    01/10/2023  Time:    18:22               Narrative:    EXAMINATION:  MRI FOOT (MIDFOOT) LEFT W W/O CONTRAST    CLINICAL HISTORY:  Osteomyelitis, foot;eval OM, abscess left foot;  Other acute osteomyelitis, unspecified ankle and foot, Charcot left foot, chronic infection, emergency MRI    TECHNIQUE:  Multiplanar MRI of the left foot without contrast.  Dose given 10 cc Gadavist intravenous contrast.    COMPARISON:  X-ray 01/09/2023, MRI 12/23/2022    FINDINGS:  Soft tissue ulceration/defect plantar surface of the midfoot with adjacent soft tissue edema and mild enhancement.  Findings could represent cellulitis.  No abscess/fluid collection is identified.    Chronic changes of the foot.  History of neuropathic foot with probable chronic changes most prominent at the tarsometatarsal junction.  Mild chronic degenerative subcortical cystic changes in the calcaneus and subtalar region    No acute fractures are detected.  No definite acute osseous destruction.  No definite osseous enhancement.    No evidence of osteomyelitis.                                       X-Ray Foot Complete Left (Final result)  Result time 01/09/23 13:28:10      Final result by Gabe Chou MD (01/09/23 13:28:10)                   Impression:      1. Diffuse edema about the foot noting ulceration along the plantar aspect.  No convincing acute displaced fracture or dislocation of the foot.  In comparison to examination 12/22/2022, the edema appears to have decreased.  No convincing new osseous erosive or destructive process.      Electronically signed by: Gabe  MD Effie  Date:    01/09/2023  Time:    13:28               Narrative:    EXAMINATION:  XR FOOT COMPLETE 3 VIEW LEFT    CLINICAL HISTORY:  .  Pain in unspecified foot    TECHNIQUE:  AP, lateral and oblique views of the left foot were performed.    COMPARISON:  12/22/2022    FINDINGS:  Three views left foot.    There is osteopenia.  There is edema about the foot and toes.  There is Charcot appearance of the midfoot.  No radiopaque foreign body.  Allowing for extensive degenerative changes, no convincing acute displaced fracture or dislocation of the foot.  There is ulceration involving the plantar aspect of the foot.                                       US Lower Extremity Veins Left (Final result)  Result time 01/09/23 12:05:16      Final result by Gabe Chou MD (01/09/23 12:05:16)                   Impression:      No evidence of deep venous thrombosis in the left lower extremity.    Edema about the left lower extremity noting left inguinal lymphadenopathy, correlation is advised.      Electronically signed by: Gabe Chou MD  Date:    01/09/2023  Time:    12:05               Narrative:    EXAMINATION:  US LOWER EXTREMITY VEINS LEFT    CLINICAL HISTORY:  Other specified soft tissue disorders    TECHNIQUE:  Duplex and color flow Doppler evaluation and graded compression of the left lower extremity veins was performed.    COMPARISON:  12/22/2022    FINDINGS:  Duplex and color flow Doppler evaluation does not reveal any evidence of acute venous thrombosis in the common femoral, superficial femoral, greater saphenous, popliteal, peroneal, anterior tibial and posterior tibial veins of the left lower extremity.  There is no reflux to suggest valvular incompetence.  There is left lower extremity subcutaneous edema.  There is left inguinal lymphadenopathy.                                     Assessment/Plan:     Active Diagnoses:    Diagnosis Date Noted POA    PRINCIPAL PROBLEM:  Diabetic ulcer of left  midfoot associated with type 2 diabetes mellitus, limited to breakdown of skin [E11.621, L97.421] 02/11/2021 Yes    Charcot's joint of left foot [M14.672]  Yes    Cellulitis of left foot [L03.116] 05/06/2022 Yes    Iron deficiency anemia [D50.9] 05/06/2022 Yes    Hyponatremia [E87.1] 05/04/2022 Yes    Class 2 severe obesity with serious comorbidity and body mass index (BMI) of 39.0 to 39.9 in adult [E66.01, Z68.39] 08/10/2016 Not Applicable    Cellulitis of left lower extremity [L03.116] 07/16/2016 Yes    Thrombocytosis [D75.839] 07/16/2016 Yes    Bipolar 1 disorder [F31.9] 04/13/2015 Yes     Chronic    History of DVT (deep vein thrombosis) [Z86.718] 04/13/2015 Not Applicable     Chronic    Tobacco abuse [Z72.0] 03/06/2014 Yes     Chronic    Hyperlipidemia [E78.5] 02/13/2014 Yes    COPD (chronic obstructive pulmonary disease) [J44.9] 10/11/2013 Yes     Chronic    Type II diabetes mellitus with neurological manifestations [E11.49] 06/06/2013 Yes    Essential hypertension [I10] 06/06/2013 Yes     Chronic      Problems Resolved During this Admission:       Education about the prevention of limb loss.    Discussed wound healing cycle, skin integrity, ways to care for skin.Counseled patient on the effects of smoking,  PVD, and blood glucose on healing. She verbalizes understanding that it can increase the chances of delayed healing and this prolonged exposure leads to infection or progression of infection which subsequently can result in loss of limb.    The wound is cleansed of foreign material as much as possible and the base inspected for bone or abscess. Base is fibrogranular and with probe to bone    Deep wound swab cultures pending    MRI negative for OM, abscess     Vascular surgery on board    Abx per ID. On last discharge patient was sent home on 10 day course of abx without clinic follow up.    Personally believe patient would benefit from longer course of IV abx and placement in LTAC or SNF    DSD  applied    Will follow    Halle Gill DPM  Podiatry  [facility] - Med Surg

## 2023-01-12 NOTE — PROGRESS NOTES
[facility] Medicine  Progress Note    Patient Name: Audrey Natarajan  MRN: [id number]  Patient Class: IP- Inpatient   Admission Date: 1/9/2023  Length of Stay: 3 days  Attending Physician: Micha Grossman MD  Primary Care Provider: Donaldo Pena MD        Subjective:     Principal Problem:Diabetic ulcer of left midfoot associated with type 2 diabetes mellitus, limited to breakdown of skin        HPI:  Ms Audrey Natarajan is a 52 y.o. woman with DM who presents with worsening L leg swelling.     She was recently admitted 12/22-27/2022 with worsening L plantar foot ulceration. S/p debridement by Podiatry. Bone cultures no growth. Wound culture with MRSA. Seen by ID who recommended bactrim.    She took the bactrim as prescribed with last dose taken 1/5/2023. She noticed improvement in swelling and erythema on bactrim but then noticed worsening swelling and erythema to the L foot and leg. She followed up in wound care on 12/29 and then again on 1/6. Per notes on 1/6, wound had doubled in size.     She has noticed decreased appetite, increased thirst, increased urination, and high glucoses. She does smoke cigarettes.         Overview/Hospital Course:  Patient admitted for L diabetic foot ulcer. Podiatry consulted as well as ID. Patient grew out MRSA and Enterococcus on wound cultures       Interval History:  No new issues   Review of Systems   Constitutional:  Positive for activity change. Negative for chills, diaphoresis, fatigue and fever.   HENT:  Negative for congestion and dental problem.    Eyes:  Negative for discharge and itching.   Respiratory:  Negative for apnea.    Cardiovascular:  Negative for chest pain.   Gastrointestinal:  Negative for abdominal pain.   Genitourinary:  Negative for difficulty urinating and dyspareunia.   Skin:  Negative for color change and pallor.   Neurological:  Negative for facial asymmetry.   Psychiatric/Behavioral:  Negative for agitation and  behavioral problems.    Objective:     Vital Signs (Most Recent):  Temp: 98 °F (36.7 °C) (01/12/23 0700)  Pulse: 87 (01/12/23 0700)  Resp: 18 (01/12/23 0700)  BP: (!) 142/69 (01/12/23 0700)  SpO2: (!) 94 % (01/12/23 0700)   Vital Signs (24h Range):  Temp:  [98 °F (36.7 °C)-99 °F (37.2 °C)] 98 °F (36.7 °C)  Pulse:  [77-95] 87  Resp:  [17-20] 18  SpO2:  [93 %-96 %] 94 %  BP: (125-144)/(61-73) 142/69     Weight: 107 kg (235 lb 14.3 oz)  Body mass index is 38.07 kg/m².    Intake/Output Summary (Last 24 hours) at 1/12/2023 0824  Last data filed at 1/12/2023 0445  Gross per 24 hour   Intake 1053 ml   Output 1950 ml   Net -897 ml      Physical Exam  Vitals and nursing note reviewed.   Constitutional:       General: She is not in acute distress.     Appearance: Normal appearance. She is obese. She is not ill-appearing or toxic-appearing.   HENT:      Head: Normocephalic and atraumatic.      Nose: Nose normal.      Mouth/Throat:      Pharynx: Oropharynx is clear.   Eyes:      Conjunctiva/sclera: Conjunctivae normal.   Cardiovascular:      Rate and Rhythm: Normal rate and regular rhythm.   Pulmonary:      Effort: Pulmonary effort is normal. No respiratory distress.   Skin:     General: Skin is dry.   Neurological:      Mental Status: She is alert and oriented to person, place, and time.   Psychiatric:         Mood and Affect: Mood normal.         Behavior: Behavior normal.       Significant Labs: All pertinent labs within the past 24 hours have been reviewed.  BMP:   Recent Labs   Lab 01/12/23  0440   *   *   K 4.7   CL 98   CO2 27   BUN 11   CREATININE 0.6   CALCIUM 9.4     CBC:   Recent Labs   Lab 01/11/23  0518 01/12/23  0440   WBC 10.52 9.77   HGB 10.3* 10.1*   HCT 32.1* 31.8*   * 744*       Significant Imaging:       Assessment/Plan:      * Diabetic ulcer of left midfoot associated with type 2 diabetes mellitus, limited to breakdown of skin  Admitted with worsening L plantar DM foot wound. S/p I&D in  12/2022 with bone cultures no growth and treated for skin/soft tissue infection with bactrim x10 days. Worsening with enlarging wound and cellulitis involving the foot and the leg.   - On vanc/ceftriaxone  - Podiatry consulted     Patient's FSGs are uncontrolled due to hyperglycemia on current medication regimen.  Last A1c reviewed-   Lab Results   Component Value Date    HGBA1C 7.1 (H) 12/22/2022     Most recent fingerstick glucose reviewed-   Recent Labs   Lab 01/11/23  1111 01/11/23  1628 01/11/23  2009 01/12/23  0702   POCTGLUCOSE 167* 200* 330* 134*     Current correctional scale  Low  Maintain anti-hyperglycemic dose as follows-   Antihyperglycemics (From admission, onward)    Start     Stop Route Frequency Ordered    01/09/23 2100  insulin detemir U-100 pen 10 Units         -- SubQ Nightly 01/09/23 1620    01/09/23 1730  insulin aspart U-100 pen 3 Units         -- SubQ 3 times daily with meals 01/09/23 1620    01/09/23 1719  insulin aspart U-100 pen 0-5 Units         -- SubQ Before meals & nightly PRN 01/09/23 1620        Hold Oral hypoglycemics while patient is in the hospital.  Podiatry consulted. Blood cultures are NG  Wound with Staph. ID consulted for recs for discharge    MRSA and Enteroccocus on wound cultures. Follow podiatry/ID recs.    Charcot's joint of left foot  This contributes to wound formation       Cellulitis of left foot  See DM foot wound      Iron deficiency anemia  Iron deficient by labs 12/2022. Not appropriate for IV iron in setting of active infection.   - started PO iron       Hyponatremia  Na slightly low on admit. May be attributed to antipsychotics. Not symptomatic.   - BMP in AM      Class 2 severe obesity with serious comorbidity and body mass index (BMI) of 39.0 to 39.9 in adult  Body mass index is 39.71 kg/m². Morbid obesity complicates all aspects of disease management from diagnostic modalities to treatment. Weight loss encouraged and health benefits explained to  patient.         Thrombocytosis  Plts elevated most likely due to iron deficiency. Treat iron deficiency.       Cellulitis of left lower extremity  See DM foot wound      Bipolar 1 disorder  No signs of acute bibi or depression. She is NOT taking carbamazepine.   - continue risperdal- takes 3mg BID  - continue depakote- takes 1500mg QHS      History of DVT (deep vein thrombosis)  History of DVT and PE. Most recent US shows no DVT.   - continue home eliquis       Tobacco abuse  Patient was counseled on smoking cessation for 4 minutes. Encouraged cessation.       Hyperlipidemia  Continue home pravastatin       COPD (chronic obstructive pulmonary disease)  No PFTs to review. No signs of acute exacerbation. Stable on room air.       Essential hypertension  BP is borderline. Hold home metoprolol and lisinopril for now.       Type II diabetes mellitus with neurological manifestations  With neuropathy affecting feet. On gabapentin but not filled since 11/2022  - continue gabapentin       VTE Risk Mitigation (From admission, onward)         Ordered     apixaban tablet 5 mg  2 times daily         01/09/23 1620     IP VTE HIGH RISK PATIENT  Once         01/09/23 1620     Reason for No Pharmacological VTE Prophylaxis  Once        Question:  Reasons:  Answer:  Already adequately anticoagulated on oral Anticoagulants    01/09/23 1620                Discharge Planning   HUMBERTO:      Code Status: Full Code   Is the patient medically ready for discharge?:     Reason for patient still in hospital (select all that apply): Patient unstable  Discharge Plan A: Long-term acute care facility (LTAC)   Discharge Delays: (!) Post-Acute Set-up              Micha Lema MD  Department of Hospital Medicine   [facility] [place] [facility] Surg

## 2023-01-12 NOTE — PLAN OF CARE
Problem: Physical Therapy  Goal: Physical Therapy Goal  Outcome: Adequate for Care Transition   Initial eval completed.  Pt demonstrates safety with transfers and gait short distance NWB, no further skilled PT needed at this time.

## 2023-01-12 NOTE — ASSESSMENT & PLAN NOTE
"52M with h/o DM with charcot foot, DVT on Eliquis, PAD, ongoing tobacco abuse admitted 1/9 with worsening L leg swelling. Notably she has had several admits over the past several months for L foot infections (12/22 cultures with MDR MRSA treated with bactrim).   Bcx NGTD. MRI from 1/10- Soft tissue ulceration and edema could represent cellulitis.  No focal abscess or acute osteomyelitis is detected. Podiatry sent wound swab, growing MRSA and enterococcus (susceptibilties pending). Per podiatry "Personally believe patient would benefit from longer course of IV abx and placement in LTAC or SNF". Vascular following. ID consulted for "abx recs for discharge. Thanks"     Recommendations:   - continue vancomycin. Will de-escalate based on pending culture results. Not opposed to stopping the cefepime. Would wait for enterococcus susceptibilities to finalize before d/c  - would appreciate bone biopsy sent for path/culture, if possible, to confirm presence of osteomyelitis.   - wound care as per podiatry  - please ensure she has adequate perfusion to heal wound  - tobacco cessation     Outpatient Antibiotic Therapy Plan:    Please send referral to [facility].    1) Infection: foot skin/soft tissue infection    2) Discharge Antibiotics:    Intravenous antibiotics:  IV vancomycin, pharmacy to dose - TENTATIVE PLAN (pending enterococcus susceptibilities)       3) Therapy Duration:  approx 3-4 weeks and will re-evaluate clinically (could be extended to 6 weeks)    Estimated end date of IV antibiotics: 2/5/23    4) Outpatient Weekly Labs:    Order the following labs to be drawn on Mondays:    CBC   CMP    CRP   Vancomycin trough. Target 15-20    If discharged on vancomycin IV, order the following additional labs to be drawn on Thursdays:   CMP    Vancomycin trough. Target 15-20    If vancomycin trough is not at target (15-20) prior to discharge, schedule vancomycin trough to be drawn " before their fourth outpatient dose.    5) Fax Lab Results to Infectious Diseases Provider: Dr Zavala    [facility] Fax Number: [phone]    6) Outpatient Infectious Diseases Follow-up     Follow-up appointment will be arranged by the ID clinic and will be found in the patient's appointments tab.     Prior to discharge, please ensure the patient's follow-up has been scheduled.     If there is still no follow-up scheduled prior to discharge, please send an EPIC message to Leidy Brooks in Infectious Diseases.        Notably pt reporting vomiting with abx, but per nursing she hasn't had any vomiting    Discussed with primary team and sw

## 2023-01-12 NOTE — PLAN OF CARE
Patient is currently admitted in hospital. Has number to call us when she is discharged. New referral placed

## 2023-01-12 NOTE — PLAN OF CARE
Problem: Skin Injury Risk Increased  Goal: Skin Health and Integrity  Outcome: Ongoing, Progressing     Problem: Adult Inpatient Plan of Care  Goal: Plan of Care Review  Outcome: Ongoing, Progressing  Goal: Absence of Hospital-Acquired Illness or Injury  Outcome: Ongoing, Progressing  Goal: Optimal Comfort and Wellbeing  Outcome: Ongoing, Progressing     Problem: Infection  Goal: Absence of Infection Signs and Symptoms  Outcome: Ongoing, Progressing     Problem: Diabetes Comorbidity  Goal: Blood Glucose Level Within Targeted Range  Outcome: Ongoing, Progressing     Problem: Impaired Wound Healing  Goal: Optimal Wound Healing  Outcome: Ongoing, Progressing

## 2023-01-12 NOTE — SUBJECTIVE & OBJECTIVE
"Interval history: NAEO. Complaining of nausea/vomiting with the antibiotics, unsure which one. Working with PT.    Past Surgical History:   Procedure Laterality Date    ABDOMINAL SURGERY  2010    gastric sleeve    BILATERAL OOPHORECTOMY Bilateral 1/12/2015    CHOLECYSTECTOMY      DEBRIDEMENT OF FOOT Bilateral 5/10/2022    Procedure: DEBRIDEMENT, FOOT;  Surgeon: Maira De Los Santos DPM;  Location: [facility] OR;  Service: Podiatry;  Laterality: Bilateral;    Green' s filter Right 7/4/2012    Right Neck & Tunneled Down.    HERNIA REPAIR      "[place] of Hernias Repaires around th Belly Button.", pt. states    INCISION AND DRAINAGE FOOT Left 12/24/2022    Procedure: INCISION AND DRAINAGE, FOOT;  Surgeon: Fahad Razo DPM;  Location: [facility] OR;  Service: Podiatry;  Laterality: Left;    LAPAROSCOPIC CHOLECYSTECTOMY N/A 9/10/2020    Procedure: CHOLECYSTECTOMY, LAPAROSCOPIC;  Surgeon: Montrell Gutierrez MD;  Location: [facility] OR;  Service: General;  Laterality: N/A;  RN PREOP 9/9----COVID Negative  9/9    OVARIAN CYST REMOVAL  3/13/2014    MD REMOVAL OF OVARY/TUBE(S)      SPLENECTOMY, TOTAL  July 2003    TONSILLECTOMY      as a child    TYMPANOSTOMY TUBE PLACEMENT  1976    VEIN SURGERY  2003    Lt leg       Review of patient's allergies indicates:   Allergen Reactions    Morphine Other (See Comments)     Patient had a psychotic episode after taking Morphine  Agitation, hallucinations    Penicillins Anaphylaxis     Tolerated cephalosporins in the past    Januvia [sitagliptin] Hives    Carbamazepine Other (See Comments)     hyponatremia       No current facility-administered medications on file prior to encounter.     Current Outpatient Medications on File Prior to Encounter   Medication Sig    acetaminophen (TYLENOL) 500 MG tablet Take 2 tablets (1,000 mg total) by mouth every 6 (six) hours as needed for Pain.    DUPIXENT  mg/2 mL PnIj Inject into the skin.    gabapentin (NEURONTIN) 300 MG capsule TAKE 2 CAPSULES(600 MG) BY MOUTH TWICE " DAILY    lisinopriL 10 MG tablet Take 1 tablet (10 mg total) by mouth once daily.    loratadine (CLARITIN) 10 mg tablet Take 1 tablet (10 mg total) by mouth once daily.    metFORMIN (GLUCOPHAGE) 1000 MG tablet Take 1 tablet (1,000 mg total) by mouth 2 (two) times daily with meals.    methocarbamoL (ROBAXIN) 500 MG Tab Take 500 mg by mouth. Frequency could not be confirmed.    metoprolol tartrate (LOPRESSOR) 25 MG tablet Take 1 tablet (25 mg total) by mouth 2 (two) times daily.    multivitamin Tab Take 1 tablet by mouth once daily.    pantoprazole (PROTONIX) 40 MG tablet Take 1 tablet (40 mg total) by mouth once daily.    pravastatin (PRAVACHOL) 40 MG tablet TAKE 1 TABLET(40 MG) BY MOUTH EVERY EVENING    risperiDONE (RISPERDAL M-TABS) 3 MG disintegrating tablet Take 1 tablet (3 mg total) by mouth 2 (two) times daily.    semaglutide (OZEMPIC) 1 mg/dose (4 mg/3 mL) Inject 1 mg into the skin every 7 days.    traMADoL (ULTRAM) 50 mg tablet Take 1 tablet (50 mg total) by mouth every 8 (eight) hours as needed for Pain.    VYVANSE 40 mg Cap Take 40 mg by mouth once daily.    albuterol (PROVENTIL/VENTOLIN HFA) 90 mcg/actuation inhaler INHALE 2 PUFFS INTO THE LUNGS EVERY 6 HOURS AS NEEDED FOR WHEEZING. RESCUE    albuterol-ipratropium (DUO-NEB) 2.5 mg-0.5 mg/3 mL nebulizer solution Take 3 mLs by nebulization every 6 (six) hours as needed for Wheezing or Shortness of Breath. Rescue    ammonium lactate (LAC-HYDRIN) 12 % lotion APPL Y ONCE TOPICALLY TWICE DAILY FOR 30 DAYS    apixaban (ELIQUIS) 5 mg Tab Take 1 tablet (5 mg total) by mouth 2 (two) times daily.    aspirin 81 MG Chew Take 1 tablet (81 mg total) by mouth once daily.    bumetanide (BUMEX) 1 MG tablet Take 1 tablet (1 mg total) by mouth once daily.    divalproex (DEPAKOTE) 500 MG TbEC Take 1 tablet (500 mg total) by mouth once daily. PO QAM (Patient taking differently: Take 1,500 mg by mouth every evening. PO QAM)    fluticasone propionate (FLONASE) 50 mcg/actuation  nasal spray 2 sprays (100 mcg total) by Each Nostril route daily as needed (Nasal congestion).    fluticasone-salmeterol diskus inhaler 250-50 mcg Inhale 1 puff into the lungs 2 (two) times daily. Controller    hydrOXYzine (ATARAX) 50 MG tablet Take 50 mg by mouth 4 (four) times daily as needed.    nystatin (NYSTOP) powder APPLY TO ABDOMINAL AND BREAST SKIN FOLD TWICE DAILY.    [DISCONTINUED] diclofenac sodium (VOLTAREN) 1 % Gel Apply 2 g topically 4 (four) times daily as needed (Apply to painful area up to 4 times a day as needed for pain). Apply to painful area 4 times a day as needed for pain    [DISCONTINUED] furosemide (LASIX) 20 MG tablet TAKE 1 TABLET(20 MG) BY MOUTH EVERY DAY    [DISCONTINUED] QUEtiapine (SEROQUEL) 200 MG Tab Take 1 tablet (200 mg total) by mouth before breakfast.     Family History       Problem Relation (Age of Onset)    Cataracts Father    Diabetes Father, Paternal Grandfather    Heart disease Father, Paternal Grandfather    Hypertension Father    No Known Problems Mother, Sister, Brother, Maternal Aunt, Maternal Uncle, Paternal Aunt, Paternal Uncle, Maternal Grandfather    Ovarian cancer Maternal Grandmother, Paternal Grandmother          Tobacco Use    Smoking status: Every Day     Packs/day: 1.00     Years: 37.00     Pack years: 37.00     Types: Cigarettes     Last attempt to quit: 2020     Years since quittin.1    Smokeless tobacco: Never    Tobacco comments:     Enrolled in the [employer] on 5/3/14 ([facility] Member ID # [id number]). Ambulatory referral to Smoking Cessation Program   Substance and Sexual Activity    Alcohol use: No     Alcohol/week: 0.0 standard drinks    Drug use: No    Sexual activity: Yes     Partners: Male     Review of Systems   Constitutional:  Positive for activity change, appetite change and fever. Negative for chills.   HENT:  Positive for congestion. Negative for sinus pressure, sinus pain and trouble swallowing.    Respiratory:  Negative for cough,  chest tightness and shortness of breath.    Cardiovascular:  Positive for leg swelling. Negative for chest pain and palpitations.   Gastrointestinal:  Negative for abdominal pain, constipation, diarrhea, nausea and vomiting.   Endocrine: Positive for polyuria.   Genitourinary:  Negative for difficulty urinating.   Musculoskeletal:  Negative for arthralgias and myalgias.   Skin:  Positive for color change, rash and wound.   Neurological:  Positive for numbness. Negative for weakness and headaches.   Hematological:  Negative for adenopathy.   Psychiatric/Behavioral:  Negative for confusion.    Objective:     Vital Signs (Most Recent):  Temp: 99.5 °F (37.5 °C) (01/12/23 1058)  Pulse: (!) 115 (01/12/23 1058)  Resp: 20 (01/12/23 1058)  BP: 139/68 (01/12/23 1058)  SpO2: 95 % (01/12/23 1058)   Vital Signs (24h Range):  Temp:  [98 °F (36.7 °C)-99.5 °F (37.5 °C)] 99.5 °F (37.5 °C)  Pulse:  [] 115  Resp:  [17-20] 20  SpO2:  [93 %-96 %] 95 %  BP: (125-144)/(61-73) 139/68     Weight: 107 kg (235 lb 14.3 oz)  Body mass index is 38.07 kg/m².    Physical Exam  Vitals and nursing note reviewed.   Constitutional:       General: She is not in acute distress.     Appearance: She is obese. She is not ill-appearing, toxic-appearing or diaphoretic.   HENT:      Head: Normocephalic and atraumatic.      Nose: Nose normal.      Mouth/Throat:      Mouth: Mucous membranes are moist.   Eyes:      General: No scleral icterus.     Conjunctiva/sclera: Conjunctivae normal.   Cardiovascular:      Rate and Rhythm: Normal rate and regular rhythm.      Pulses: Normal pulses.      Heart sounds: Normal heart sounds. No murmur heard.    No gallop.   Pulmonary:      Effort: Pulmonary effort is normal. No respiratory distress.      Breath sounds: Normal breath sounds. No wheezing or rales.      Comments: Room air  Abdominal:      General: Bowel sounds are normal. There is no distension.      Palpations: Abdomen is soft. There is no mass.       Tenderness: There is no abdominal tenderness. There is no guarding.   Musculoskeletal:      Right lower leg: No edema.      Left lower leg: No edema.      Comments: L foot bandaged   Skin:     General: Skin is warm and dry.      Findings: Erythema (left lower leg) and rash present.   Neurological:      Mental Status: She is alert and oriented to person, place, and time.      Sensory: Sensory deficit (bilateral feet) present.      Motor: No weakness.                       Significant Labs: All pertinent labs within the past 24 hours have been reviewed.    Significant Imaging: I have reviewed all pertinent imaging results/findings within the past 24 hours.

## 2023-01-12 NOTE — PLAN OF CARE
Rachel from [facility] in [place] stated that they have accepted patient, if she wants to come. MELVI informed Rachel that patient has agreed to go to [place]. Rachel inquired about stop date for IV abx, MELVI informed that ID have not made their final recs yet.     MELVI reached out to Dr. Zavala to inquire about stop date and IV abx. Dr. Zavala stated that she is still waiting on her cultures. Patient is growing enterococcus, susceptibilities pending and will probably be on a couple weeks of vancomycin, but she has no pathology to confirm osteo and her mri was negative.         01/12/23 1439   Post-Acute Status   Post-Acute Authorization Placement;Home Health  (LTAC)   Post-Acute Placement Status Pending post-acute provider review/more information requested   Home Health Status Discharge Plan Changed   Coverage Medicaid   Discharge Delays (!) Post-Acute Set-up   Discharge Plan   Discharge Plan A Long-term acute care facility (LTAC)   Discharge Plan B Home Health

## 2023-01-12 NOTE — NURSING
[facility] MEWS TRIGGER FOLLOW UP       MEWS Monitoring, Score is:3  Indication for review: HR    Bedside Nurse,  contacted, no concerns verbalized at this time, instructed to call [phone] for further concerns or assistance..

## 2023-01-13 LAB
BACTERIA BLD CULT: NORMAL
BACTERIA BLD CULT: NORMAL
BACTERIA SPEC AEROBE CULT: ABNORMAL
POCT GLUCOSE: 196 MG/DL (ref 70–110)
POCT GLUCOSE: 200 MG/DL (ref 70–110)
POCT GLUCOSE: 283 MG/DL (ref 70–110)
POCT GLUCOSE: 347 MG/DL (ref 70–110)
VANCOMYCIN TROUGH SERPL-MCNC: 18.5 UG/ML (ref 10–22)

## 2023-01-13 PROCEDURE — 25000003 PHARM REV CODE 250: Performed by: HOSPITALIST

## 2023-01-13 PROCEDURE — 99232 SBSQ HOSP IP/OBS MODERATE 35: CPT | Mod: ,,, | Performed by: INTERNAL MEDICINE

## 2023-01-13 PROCEDURE — 99232 PR SUBSEQUENT HOSPITAL CARE,LEVL II: ICD-10-PCS | Mod: ,,, | Performed by: INTERNAL MEDICINE

## 2023-01-13 PROCEDURE — 80202 ASSAY OF VANCOMYCIN: CPT | Performed by: INTERNAL MEDICINE

## 2023-01-13 PROCEDURE — 87102 FUNGUS ISOLATION CULTURE: CPT | Performed by: PODIATRIST

## 2023-01-13 PROCEDURE — S4991 NICOTINE PATCH NONLEGEND: HCPCS | Performed by: INTERNAL MEDICINE

## 2023-01-13 PROCEDURE — 88311 PR  DECALCIFY TISSUE: ICD-10-PCS | Mod: 26,,, | Performed by: PATHOLOGY

## 2023-01-13 PROCEDURE — 87206 SMEAR FLUORESCENT/ACID STAI: CPT | Performed by: PODIATRIST

## 2023-01-13 PROCEDURE — 88305 TISSUE EXAM BY PATHOLOGIST: ICD-10-PCS | Mod: 26,,, | Performed by: PATHOLOGY

## 2023-01-13 PROCEDURE — 36415 COLL VENOUS BLD VENIPUNCTURE: CPT | Performed by: INTERNAL MEDICINE

## 2023-01-13 PROCEDURE — 88305 TISSUE EXAM BY PATHOLOGIST: CPT | Mod: 26,,, | Performed by: PATHOLOGY

## 2023-01-13 PROCEDURE — A4216 STERILE WATER/SALINE, 10 ML: HCPCS | Performed by: INTERNAL MEDICINE

## 2023-01-13 PROCEDURE — 88311 DECALCIFY TISSUE: CPT | Mod: 26,,, | Performed by: PATHOLOGY

## 2023-01-13 PROCEDURE — 87205 SMEAR GRAM STAIN: CPT | Performed by: PODIATRIST

## 2023-01-13 PROCEDURE — 87116 MYCOBACTERIA CULTURE: CPT | Performed by: PODIATRIST

## 2023-01-13 PROCEDURE — 88311 DECALCIFY TISSUE: CPT | Performed by: PATHOLOGY

## 2023-01-13 PROCEDURE — 25000003 PHARM REV CODE 250: Performed by: INTERNAL MEDICINE

## 2023-01-13 PROCEDURE — 87075 CULTR BACTERIA EXCEPT BLOOD: CPT | Performed by: PODIATRIST

## 2023-01-13 PROCEDURE — 87070 CULTURE OTHR SPECIMN AEROBIC: CPT | Performed by: PODIATRIST

## 2023-01-13 PROCEDURE — 63600175 PHARM REV CODE 636 W HCPCS: Performed by: HOSPITALIST

## 2023-01-13 PROCEDURE — 11000001 HC ACUTE MED/SURG PRIVATE ROOM

## 2023-01-13 PROCEDURE — 27000207 HC ISOLATION

## 2023-01-13 PROCEDURE — 88305 TISSUE EXAM BY PATHOLOGIST: CPT | Performed by: PATHOLOGY

## 2023-01-13 RX ORDER — DIPHENHYDRAMINE HCL 25 MG
25 CAPSULE ORAL EVERY 6 HOURS PRN
Status: DISCONTINUED | OUTPATIENT
Start: 2023-01-13 | End: 2023-01-17

## 2023-01-13 RX ORDER — GUAIFENESIN 100 MG/5ML
200 SOLUTION ORAL EVERY 4 HOURS PRN
Status: DISCONTINUED | OUTPATIENT
Start: 2023-01-13 | End: 2023-01-17 | Stop reason: HOSPADM

## 2023-01-13 RX ADMIN — VANCOMYCIN HYDROCHLORIDE 2000 MG: 1 INJECTION, POWDER, LYOPHILIZED, FOR SOLUTION INTRAVENOUS at 06:01

## 2023-01-13 RX ADMIN — INSULIN ASPART 2 UNITS: 100 INJECTION, SOLUTION INTRAVENOUS; SUBCUTANEOUS at 09:01

## 2023-01-13 RX ADMIN — Medication 10 ML: at 12:01

## 2023-01-13 RX ADMIN — Medication 10 ML: at 05:01

## 2023-01-13 RX ADMIN — APIXABAN 5 MG: 5 TABLET, FILM COATED ORAL at 08:01

## 2023-01-13 RX ADMIN — MICONAZOLE NITRATE: 20 CREAM TOPICAL at 09:01

## 2023-01-13 RX ADMIN — DIVALPROEX SODIUM 1500 MG: 250 TABLET, DELAYED RELEASE ORAL at 08:01

## 2023-01-13 RX ADMIN — INSULIN ASPART 3 UNITS: 100 INJECTION, SOLUTION INTRAVENOUS; SUBCUTANEOUS at 04:01

## 2023-01-13 RX ADMIN — Medication 1 PATCH: at 08:01

## 2023-01-13 RX ADMIN — OXYCODONE AND ACETAMINOPHEN 1 TABLET: 10; 325 TABLET ORAL at 03:01

## 2023-01-13 RX ADMIN — OXYCODONE AND ACETAMINOPHEN 1 TABLET: 10; 325 TABLET ORAL at 06:01

## 2023-01-13 RX ADMIN — CEFEPIME HYDROCHLORIDE 2 G: 2 INJECTION, SOLUTION INTRAVENOUS at 05:01

## 2023-01-13 RX ADMIN — SENNOSIDES AND DOCUSATE SODIUM 1 TABLET: 50; 8.6 TABLET ORAL at 08:01

## 2023-01-13 RX ADMIN — MUPIROCIN: 20 OINTMENT TOPICAL at 08:01

## 2023-01-13 RX ADMIN — ONDANSETRON 4 MG: 2 INJECTION INTRAMUSCULAR; INTRAVENOUS at 06:01

## 2023-01-13 RX ADMIN — GABAPENTIN 600 MG: 300 CAPSULE ORAL at 08:01

## 2023-01-13 RX ADMIN — VANCOMYCIN HYDROCHLORIDE 2000 MG: 1 INJECTION, POWDER, LYOPHILIZED, FOR SOLUTION INTRAVENOUS at 05:01

## 2023-01-13 RX ADMIN — INSULIN ASPART 3 UNITS: 100 INJECTION, SOLUTION INTRAVENOUS; SUBCUTANEOUS at 12:01

## 2023-01-13 RX ADMIN — RISPERIDONE 3 MG: 1 TABLET ORAL at 08:01

## 2023-01-13 RX ADMIN — INSULIN DETEMIR 10 UNITS: 100 INJECTION, SOLUTION SUBCUTANEOUS at 09:01

## 2023-01-13 RX ADMIN — BUMETANIDE 1 MG: 1 TABLET ORAL at 08:01

## 2023-01-13 RX ADMIN — LIDOCAINE 1 PATCH: 50 PATCH CUTANEOUS at 10:01

## 2023-01-13 RX ADMIN — DIPHENHYDRAMINE HYDROCHLORIDE 25 MG: 25 CAPSULE ORAL at 05:01

## 2023-01-13 RX ADMIN — INSULIN ASPART 3 UNITS: 100 INJECTION, SOLUTION INTRAVENOUS; SUBCUTANEOUS at 07:01

## 2023-01-13 RX ADMIN — FERROUS SULFATE TAB 325 MG (65 MG ELEMENTAL FE) 1 EACH: 325 (65 FE) TAB at 08:01

## 2023-01-13 RX ADMIN — PRAVASTATIN SODIUM 40 MG: 40 TABLET ORAL at 08:01

## 2023-01-13 NOTE — PROGRESS NOTES
[facility] Medicine  Progress Note    Patient Name: Audrey Natarajan  MRN: [id number]  Patient Class: IP- Inpatient   Admission Date: 1/9/2023  Length of Stay: 4 days  Attending Physician: Micha Grossman MD  Primary Care Provider: Donaldo Pena MD        Subjective:     Principal Problem:Diabetic ulcer of left midfoot associated with type 2 diabetes mellitus, limited to breakdown of skin        HPI:  Ms Audrey Natarajan is a 52 y.o. woman with DM who presents with worsening L leg swelling.     She was recently admitted 12/22-27/2022 with worsening L plantar foot ulceration. S/p debridement by Podiatry. Bone cultures no growth. Wound culture with MRSA. Seen by ID who recommended bactrim.    She took the bactrim as prescribed with last dose taken 1/5/2023. She noticed improvement in swelling and erythema on bactrim but then noticed worsening swelling and erythema to the L foot and leg. She followed up in wound care on 12/29 and then again on 1/6. Per notes on 1/6, wound had doubled in size.     She has noticed decreased appetite, increased thirst, increased urination, and high glucoses. She does smoke cigarettes.           Overview/Hospital Course:  Patient admitted for L diabetic foot ulcer. Podiatry consulted as well as ID. Patient grew out MRSA and Enterococcus on wound cultures       Interval History: No new issues     Review of Systems   Constitutional:  Positive for activity change. Negative for chills, diaphoresis, fatigue and fever.   HENT:  Negative for congestion and dental problem.    Eyes:  Negative for discharge and itching.   Respiratory:  Negative for apnea.    Cardiovascular:  Negative for chest pain.   Gastrointestinal:  Negative for abdominal pain.   Genitourinary:  Negative for difficulty urinating and dyspareunia.   Skin:  Negative for color change and pallor.   Neurological:  Negative for facial asymmetry.   Psychiatric/Behavioral:  Negative for agitation and  behavioral problems.    Objective:     Vital Signs (Most Recent):  Temp: 98.2 °F (36.8 °C) (01/13/23 0445)  Pulse: 80 (01/13/23 0445)  Resp: 18 (01/13/23 0445)  BP: (!) 142/66 (01/13/23 0445)  SpO2: 96 % (01/13/23 0445)   Vital Signs (24h Range):  Temp:  [98 °F (36.7 °C)-99.5 °F (37.5 °C)] 98.2 °F (36.8 °C)  Pulse:  [] 80  Resp:  [18-20] 18  SpO2:  [93 %-96 %] 96 %  BP: (132-144)/(63-69) 142/66     Weight: 107 kg (235 lb 14.3 oz)  Body mass index is 38.07 kg/m².    Intake/Output Summary (Last 24 hours) at 1/13/2023 0613  Last data filed at 1/12/2023 1730  Gross per 24 hour   Intake 880 ml   Output 2000 ml   Net -1120 ml      Physical Exam  Vitals and nursing note reviewed.   Constitutional:       General: She is not in acute distress.     Appearance: Normal appearance. She is obese. She is not ill-appearing or toxic-appearing.   HENT:      Head: Normocephalic and atraumatic.      Nose: Nose normal.      Mouth/Throat:      Pharynx: Oropharynx is clear.   Eyes:      Conjunctiva/sclera: Conjunctivae normal.   Cardiovascular:      Rate and Rhythm: Normal rate and regular rhythm.   Pulmonary:      Effort: Pulmonary effort is normal. No respiratory distress.   Skin:     General: Skin is dry.   Neurological:      Mental Status: She is alert and oriented to person, place, and time.   Psychiatric:         Mood and Affect: Mood normal.         Behavior: Behavior normal.       Significant Labs: All pertinent labs within the past 24 hours have been reviewed.  BMP:   Recent Labs   Lab 01/12/23 0440   *   *   K 4.7   CL 98   CO2 27   BUN 11   CREATININE 0.6   CALCIUM 9.4     CBC:   Recent Labs   Lab 01/12/23 0440   WBC 9.77   HGB 10.1*   HCT 31.8*   *       Significant Imaging:       Assessment/Plan:      * Diabetic ulcer of left midfoot associated with type 2 diabetes mellitus, limited to breakdown of skin  Admitted with worsening L plantar DM foot wound. S/p I&D in 12/2022 with bone cultures no  growth and treated for skin/soft tissue infection with bactrim x10 days. Worsening with enlarging wound and cellulitis involving the foot and the leg.   - On vanc/ceftriaxone  - Podiatry consulted     Patient's FSGs are uncontrolled due to hyperglycemia on current medication regimen.  Last A1c reviewed-   Lab Results   Component Value Date    HGBA1C 7.1 (H) 12/22/2022     Most recent fingerstick glucose reviewed-   Recent Labs   Lab 01/12/23  0702 01/12/23  1100 01/12/23  1618 01/12/23  2057   POCTGLUCOSE 134* 253* 198* 248*     Current correctional scale  Low  Maintain anti-hyperglycemic dose as follows-   Antihyperglycemics (From admission, onward)      Start     Stop Route Frequency Ordered    01/09/23 2100  insulin detemir U-100 pen 10 Units         -- SubQ Nightly 01/09/23 1620    01/09/23 1730  insulin aspart U-100 pen 3 Units         -- SubQ 3 times daily with meals 01/09/23 1620    01/09/23 1719  insulin aspart U-100 pen 0-5 Units         -- SubQ Before meals & nightly PRN 01/09/23 1620          Hold Oral hypoglycemics while patient is in the hospital.  Podiatry consulted. Blood cultures are NG  Wound with Staph. ID consulted for recs for discharge    MRSA and Enteroccocus on wound cultures. Follow podiatry/ID recs.    Charcot's joint of left foot  This contributes to wound formation       Cellulitis of left foot  See DM foot wound      Iron deficiency anemia  Iron deficient by labs 12/2022. Not appropriate for IV iron in setting of active infection.   - started PO iron       Hyponatremia  Na slightly low on admit. May be attributed to antipsychotics. Not symptomatic.   - BMP in AM      Class 2 severe obesity with serious comorbidity and body mass index (BMI) of 39.0 to 39.9 in adult  Body mass index is 39.71 kg/m². Morbid obesity complicates all aspects of disease management from diagnostic modalities to treatment. Weight loss encouraged and health benefits explained to patient.         Thrombocytosis  Plts  elevated most likely due to iron deficiency. Treat iron deficiency.       Cellulitis of left lower extremity  See DM foot wound      Bipolar 1 disorder  No signs of acute bibi or depression. She is NOT taking carbamazepine.   - continue risperdal- takes 3mg BID  - continue depakote- takes 1500mg QHS      History of DVT (deep vein thrombosis)  History of DVT and PE. Most recent US shows no DVT.   - continue home eliquis       Tobacco abuse  Patient was counseled on smoking cessation for 4 minutes. Encouraged cessation.       Hyperlipidemia  Continue home pravastatin       COPD (chronic obstructive pulmonary disease)  No PFTs to review. No signs of acute exacerbation. Stable on room air.       Essential hypertension  BP is borderline. Hold home metoprolol and lisinopril for now.       Type II diabetes mellitus with neurological manifestations  With neuropathy affecting feet. On gabapentin but not filled since 11/2022  - continue gabapentin       VTE Risk Mitigation (From admission, onward)           Ordered     apixaban tablet 5 mg  2 times daily         01/09/23 1620     IP VTE HIGH RISK PATIENT  Once         01/09/23 1620     Reason for No Pharmacological VTE Prophylaxis  Once        Question:  Reasons:  Answer:  Already adequately anticoagulated on oral Anticoagulants    01/09/23 1620                    Discharge Planning   HUMBERTO:      Code Status: Full Code   Is the patient medically ready for discharge?:     Reason for patient still in hospital (select all that apply): Patient unstable  Discharge Plan A: Long-term acute care facility (LTAC)   Discharge Delays: (!) Post-Acute Set-up      Awaiting culture results and attempting to set up Abx either at a facility or H/H.        Micha Lema MD  Department of Hospital Medicine   [facility]

## 2023-01-13 NOTE — NURSING
Report received and care assumed. Discussed plan of care and safety with patient . Reviewed call system. No acute distress noted . Sitting at bedside at this . Discussed non weight bearing status Dressing intact to left foot clean and dry

## 2023-01-13 NOTE — PROGRESS NOTES
[facility] Surg  Podiatry  Progress Note    Patient Name: Audrey Natarajan  MRN: [id number]  Admission Date: 1/9/2023  Hospital Length of Stay: 4 days  Attending Physician: Micha Grossman MD  Primary Care Provider: Donaldo Pena MD     Subjective:     History of Present Illness: 51 y/o female PMH DM2, Charcot left foot admitted for wound infection left foot. Patient seen today by Dr. Mata who sent patient to ED for further eval. H/o left foot I&D 12/24/22. Patient reports nausea. Last seen in wound care on 1/6/23.    Interval History:   1/10/2023: Patient seen in hospital room,  at bedside.  Relates pain is primarily to posterior left knee radiating to groin but also has foot and anterior leg pain.  Relates some improvement on admission.  She sates that secondary to knee pain she had not been able to properly offload charcot foot.     1/11/23: Patient seen bedside sitting in chair. Strike through noted to bandage.     1/13/23 Patient seen bedside. Bandage intact left foot.       Scheduled Meds:   apixaban  5 mg Oral BID    bumetanide  1 mg Oral Daily    divalproex  1,500 mg Oral QHS    ferrous sulfate  1 tablet Oral Daily    gabapentin  600 mg Oral BID    insulin aspart U-100  3 Units Subcutaneous TIDWM    insulin detemir U-100  10 Units Subcutaneous QHS    LIDOcaine  1 patch Transdermal Q24H    miconazole   Topical (Top) BID    mupirocin   Nasal BID    nicotine  1 patch Transdermal Daily    pravastatin  40 mg Oral QHS    risperiDONE  3 mg Oral BID    senna-docusate 8.6-50 mg  1 tablet Oral BID    sodium chloride 0.9%  10 mL Intravenous Q6H    vancomycin (VANCOCIN) IVPB  2,000 mg Intravenous Q12H     Continuous Infusions:  PRN Meds:acetaminophen, dextrose 10%, dextrose 10%, diphenhydrAMINE, glucagon (human recombinant), glucose, glucose, guaiFENesin 100 mg/5 ml, insulin aspart U-100, melatonin, methocarbamoL, naloxone, ondansetron, oxyCODONE-acetaminophen, prochlorperazine, Flushing PICC  Protocol **AND** sodium chloride 0.9% **AND** sodium chloride 0.9%, traMADoL, Pharmacy to dose Vancomycin consult **AND** vancomycin - pharmacy to dose    Review of Systems   Constitutional:  Positive for activity change. Negative for appetite change, chills, fatigue and fever.   Respiratory:  Negative for cough and shortness of breath.    Cardiovascular:  Positive for leg swelling. Negative for chest pain.   Gastrointestinal:  Negative for diarrhea, nausea and vomiting.   Musculoskeletal:  Positive for arthralgias and myalgias.   Skin:  Positive for wound.   Neurological:  Positive for numbness. Negative for weakness.        + paresthesia      Objective:     Vital Signs (Most Recent):  Temp: 98.4 °F (36.9 °C) (01/13/23 1148)  Pulse: 90 (01/13/23 1148)  Resp: 20 (01/13/23 1148)  BP: 135/61 (01/13/23 1148)  SpO2: (!) 94 % (01/13/23 1148)   Vital Signs (24h Range):  Temp:  [97.5 °F (36.4 °C)-98.4 °F (36.9 °C)] 98.4 °F (36.9 °C)  Pulse:  [75-97] 90  Resp:  [17-20] 20  SpO2:  [93 %-96 %] 94 %  BP: (132-158)/(61-71) 135/61     Weight: 107 kg (235 lb 14.3 oz)  Body mass index is 38.07 kg/m².    Physical Exam  Nursing note reviewed.   Constitutional:       General: She is not in acute distress.     Appearance: She is not toxic-appearing or diaphoretic.   Cardiovascular:      Pulses:           Dorsalis pedis pulses are 1+ on the right side and 1+ on the left side.        Posterior tibial pulses are 2+ on the right side and 2+ on the left side.   Pulmonary:      Effort: No respiratory distress.   Musculoskeletal:      Right lower leg: Edema present.      Left lower leg: Edema present.      Right ankle: Swelling present. No lateral malleolus, medial malleolus, AITF ligament, CF ligament or posterior TF ligament tenderness. Decreased range of motion.      Right Achilles Tendon: No defects. Paniagua's test negative.      Left ankle: Swelling present. No lateral malleolus, medial malleolus, AITF ligament, CF ligament, posterior  TF ligament or proximal fibula tenderness. Decreased range of motion.      Left Achilles Tendon: No defects. Paniagua's test negative.      Right foot: Swelling and tenderness present.      Left foot: Swelling, Charcot foot and tenderness present.      Comments: There is equinus deformity bilateral with decreased dorsiflexion at the ankle joint bilateral     Decreased first MPJ range of motion both weightbearing and nonweightbearing, no crepitus observed the first MP joint, + dorsal flag sign. Mild  bunion deformity is observed .     Patient has hammertoes of digits 2-5 bilateral partially reducible without symptom today.     Skin:     General: Skin is warm and dry.      Coloration: Skin is not pale.      Findings: Erythema and wound (see below) present. No laceration.      Nails: There is no clubbing.   Neurological:      Sensory: No sensory deficit.      Motor: No tremor, atrophy or abnormal muscle tone.      Deep Tendon Reflexes: Reflexes are normal and symmetric.      Comments: Paresthesias, and hyperesthesia bilateral feet at toes with no clearly identified trigger or source.     Psychiatric:         Attention and Perception: She is attentive.         Mood and Affect: Mood  anxious. Affect is not inappropriate.         Speech: She is communicative. Speech is not slurred.         Behavior: Behavior is not combative.       1/13/23:           1/11/23:           01/10/2023    Ulcer location: left plantar midfoot/midline  Signs of infection: local edema and erythema  Drainage: Sero-Sanguinous  Purulence: no  Crepitus/fluctuance: no  Periwound: Reddened, Macerated, Calloused  Base: Mixed Granular/Fibrotic  Depth:  bone  Probe to bone: yes          01/09/2023 12/27/2022      Ulcer location: left plantar midfoot/midline  Signs of infection: local edema and erythema  Drainage: Sero-Sanguinous  Purulence: no  Crepitus/fluctuance: no  Periwound: Reddened, Macerated, Calloused  Base: Mixed  Granular/Fibrotic  Depth:  bone  Probe to bone: yes             Laboratory:  A1C:   Recent Labs   Lab 09/08/22  0546 09/20/22  0957 12/22/22  2322   HGBA1C 9.1* 8.6* 7.1*       CBC:   Recent Labs   Lab 01/12/23  0440   WBC 9.77   RBC 4.05   HGB 10.1*   HCT 31.8*   *   MCV 79*   MCH 24.9*   MCHC 31.8*       CMP:   Recent Labs   Lab 01/09/23  1206 01/10/23  0531 01/12/23  0440      < > 148*   CALCIUM 9.6   < > 9.4   ALBUMIN 3.1*  --   --    PROT 7.3  --   --    *   < > 133*   K 4.7   < > 4.7   CO2 28   < > 27   CL 94*   < > 98   BUN 14   < > 11   CREATININE 0.7   < > 0.6   ALKPHOS 75  --   --    ALT 6*  --   --    AST 14  --   --    BILITOT 0.2  --   --     < > = values in this interval not displayed.       CRP:   Recent Labs   Lab 01/09/23  1206   CRP 38.3*       ESR:   Recent Labs   Lab 01/09/23  1206   SEDRATE 30*       Microbiology Results (last 7 days)       Procedure Component Value Units Date/Time    Gram stain [[id number]]     Order Status: No result Specimen: Bone from Foot, Left     Fungus culture [[id number]]     Order Status: No result Specimen: Bone from Foot, Left     Culture, Anaerobe [[id number]]     Order Status: No result Specimen: Bone from Foot, Left     Aerobic culture [[id number]]     Order Status: No result Specimen: Bone from Foot, Left     AFB Culture & Smear [[id number]]     Order Status: No result Specimen: Bone from Foot, Left     Aerobic culture [[id number]]  (Abnormal)  (Susceptibility) Collected: 01/09/23 1647    Order Status: Completed Specimen: Wound from Foot, Left Updated: 01/13/23 0721     Aerobic Bacterial Culture Results called to and read back by:Marcy dewitt 01/12/2023      08:01      METHICILLIN RESISTANT STAPHYLOCOCCUS AUREUS  Moderate      Blood culture #1 **CANNOT BE ORDERED STAT** [[id number]] Collected: 01/09/23 1205    Order Status: Completed Specimen: Blood from Peripheral, Antecubital, Left Updated: 01/12/23 150     Blood Culture, Routine No  Growth to date      No Growth to date      No Growth to date      No Growth to date    Blood culture #2 **CANNOT BE ORDERED STAT** [[id number]] Collected: 01/09/23 1214    Order Status: Completed Specimen: Blood from Peripheral, Antecubital, Left Updated: 01/12/23 1503     Blood Culture, Routine No Growth to date      No Growth to date      No Growth to date      No Growth to date    Gram stain [[id number]] Collected: 01/09/23 1647    Order Status: Completed Specimen: Wound from Foot, Left Updated: 01/10/23 0803     Gram Stain Result No WBC's      No organisms seen            Diagnostic Results:  Imaging Results              MRI Foot (Midfoot) Left W W/O Contrast (Final result)  Result time 01/10/23 18:22:24      Final result by Lee Rosas MD (01/10/23 18:22:24)                   Impression:      Soft tissue ulceration and edema could represent cellulitis.  See above comments.  No focal abscess or acute osteomyelitis is detected.  Recommend follow-up.      Electronically signed by: Lee Rosas  Date:    01/10/2023  Time:    18:22               Narrative:    EXAMINATION:  MRI FOOT (MIDFOOT) LEFT W W/O CONTRAST    CLINICAL HISTORY:  Osteomyelitis, foot;eval OM, abscess left foot;  Other acute osteomyelitis, unspecified ankle and foot, Charcot left foot, chronic infection, emergency MRI    TECHNIQUE:  Multiplanar MRI of the left foot without contrast.  Dose given 10 cc Gadavist intravenous contrast.    COMPARISON:  X-ray 01/09/2023, MRI 12/23/2022    FINDINGS:  Soft tissue ulceration/defect plantar surface of the midfoot with adjacent soft tissue edema and mild enhancement.  Findings could represent cellulitis.  No abscess/fluid collection is identified.    Chronic changes of the foot.  History of neuropathic foot with probable chronic changes most prominent at the tarsometatarsal junction.  Mild chronic degenerative subcortical cystic changes in the calcaneus and subtalar region    No acute fractures are  detected.  No definite acute osseous destruction.  No definite osseous enhancement.    No evidence of osteomyelitis.                                       X-Ray Foot Complete Left (Final result)  Result time 01/09/23 13:28:10      Final result by Gabe Chou MD (01/09/23 13:28:10)                   Impression:      1. Diffuse edema about the foot noting ulceration along the plantar aspect.  No convincing acute displaced fracture or dislocation of the foot.  In comparison to examination 12/22/2022, the edema appears to have decreased.  No convincing new osseous erosive or destructive process.      Electronically signed by: Gabe Chou MD  Date:    01/09/2023  Time:    13:28               Narrative:    EXAMINATION:  XR FOOT COMPLETE 3 VIEW LEFT    CLINICAL HISTORY:  .  Pain in unspecified foot    TECHNIQUE:  AP, lateral and oblique views of the left foot were performed.    COMPARISON:  12/22/2022    FINDINGS:  Three views left foot.    There is osteopenia.  There is edema about the foot and toes.  There is Charcot appearance of the midfoot.  No radiopaque foreign body.  Allowing for extensive degenerative changes, no convincing acute displaced fracture or dislocation of the foot.  There is ulceration involving the plantar aspect of the foot.                                       US Lower Extremity Veins Left (Final result)  Result time 01/09/23 12:05:16      Final result by Gabe Chou MD (01/09/23 12:05:16)                   Impression:      No evidence of deep venous thrombosis in the left lower extremity.    Edema about the left lower extremity noting left inguinal lymphadenopathy, correlation is advised.      Electronically signed by: Gabe Chou MD  Date:    01/09/2023  Time:    12:05               Narrative:    EXAMINATION:  US LOWER EXTREMITY VEINS LEFT    CLINICAL HISTORY:  Other specified soft tissue disorders    TECHNIQUE:  Duplex and color flow Doppler evaluation and graded compression  of the left lower extremity veins was performed.    COMPARISON:  12/22/2022    FINDINGS:  Duplex and color flow Doppler evaluation does not reveal any evidence of acute venous thrombosis in the common femoral, superficial femoral, greater saphenous, popliteal, peroneal, anterior tibial and posterior tibial veins of the left lower extremity.  There is no reflux to suggest valvular incompetence.  There is left lower extremity subcutaneous edema.  There is left inguinal lymphadenopathy.                                     Assessment/Plan:     Active Diagnoses:    Diagnosis Date Noted POA    PRINCIPAL PROBLEM:  Diabetic ulcer of left midfoot associated with type 2 diabetes mellitus, limited to breakdown of skin [E11.621, L97.421] 02/11/2021 Yes    Charcot's joint of left foot [M14.672]  Yes    Cellulitis of left foot [L03.116] 05/06/2022 Yes    Iron deficiency anemia [D50.9] 05/06/2022 Yes    Hyponatremia [E87.1] 05/04/2022 Yes    Class 2 severe obesity with serious comorbidity and body mass index (BMI) of 39.0 to 39.9 in adult [E66.01, Z68.39] 08/10/2016 Not Applicable    Cellulitis of left lower extremity [L03.116] 07/16/2016 Yes    Thrombocytosis [D75.839] 07/16/2016 Yes    Bipolar 1 disorder [F31.9] 04/13/2015 Yes     Chronic    History of DVT (deep vein thrombosis) [Z86.718] 04/13/2015 Not Applicable     Chronic    Tobacco abuse [Z72.0] 03/06/2014 Yes     Chronic    Hyperlipidemia [E78.5] 02/13/2014 Yes    COPD (chronic obstructive pulmonary disease) [J44.9] 10/11/2013 Yes     Chronic    Type II diabetes mellitus with neurological manifestations [E11.49] 06/06/2013 Yes    Essential hypertension [I10] 06/06/2013 Yes     Chronic      Problems Resolved During this Admission:       Education about the prevention of limb loss.    Discussed wound healing cycle, skin integrity, ways to care for skin.Counseled patient on the effects of smoking,  PVD, and blood glucose on healing. She verbalizes understanding that it can  increase the chances of delayed healing and this prolonged exposure leads to infection or progression of infection which subsequently can result in loss of limb.    The wound is cleansed of foreign material as much as possible and the base inspected for bone or abscess. Base is fibrogranular and with probe to bone    Discussed with ID, bone biopsy completed today see procedure note.     MRI negative for OM, abscess     Vascular surgery on board      Personally believe patient would benefit from longer course of IV abx and placement in LTAC or SNF    DSD applied    Will follow  Ok for discharge from podiatry standpoint      Wound care orders- Left foot to be done every 2-3 days. Cleanse wound with saline pat dry. Paint with betadine apply aquacel ag then adherent foam wrap with cast padding, kerlix and ACE    Bone Biopsy    Written consent obtained from patient. Time out performed to verify correct site was identified prior to initiation of procedure.    The patient was lying on his bed in his room where cc 2% Lido P local anesthetic was injected into the base of the left plantar foot. The patient was prepped and draped in the usual sterile fashion. A jamshidi needle was used to obtain  bone from the left plantar foot. The specimen was sent to Pathology and for culture and sensitivity. Hemostasis was achieved with direct pressure. The site was covered with a bandage of silver rope, 4x4 gauze, kerlix, cast padding and ACE.     Attending: Dr. Jairo RODGERS  Assistant: None   Estimated blood loss: <5 mL  Specimen removed: Bone of left foot.     Halle Gill DPM  Podiatry  [facility] [place] - Med Surg

## 2023-01-13 NOTE — PLAN OF CARE
MELVI notified Rachel at [facility] that ID recs were in. Rachel stated that she will pull them from Epic. Rachel stated that they will submit for auth today.        01/13/23 1204   Post-Acute Status   Post-Acute Authorization Placement  (LT[facility])   Post-Acute Placement Status Pending payor review/awaiting authorization (if required)   Coverage Medicaid   Discharge Delays (!) Post-Acute Set-up   Discharge Plan   Discharge Plan A Long-term acute care facility (LTAC)   Discharge Plan B Home Health

## 2023-01-13 NOTE — PLAN OF CARE
Problem: Skin Injury Risk Increased  Goal: Skin Health and Integrity  Outcome: Ongoing, Progressing     Problem: Adult Inpatient Plan of Care  Goal: Plan of Care Review  Outcome: Ongoing, Progressing  Goal: Absence of Hospital-Acquired Illness or Injury  Outcome: Ongoing, Progressing  Goal: Optimal Comfort and Wellbeing  Outcome: Ongoing, Progressing     Problem: Diabetes Comorbidity  Goal: Blood Glucose Level Within Targeted Range  Outcome: Ongoing, Progressing     Problem: Impaired Wound Healing  Goal: Optimal Wound Healing  Outcome: Ongoing, Progressing

## 2023-01-13 NOTE — PROGRESS NOTES
Pharmacokinetic Assessment Follow Up: IV Vancomycin    Vancomycin serum concentration assessment(s):    The trough level was drawn correctly and can be used to guide therapy at this time. The measurement is within the desired definitive target range of 15 to 20 mcg/mL.    Vancomycin Regimen Plan:    Continue regimen to Vancomycin 2000 mg IV every 12 hours with next serum trough concentration measured at 17:00 prior to third dose on 1/14/23    Drug levels (last 3 results):  Recent Labs   Lab Result Units 01/11/23  0518 01/12/23  0440 01/13/23  0436   Vancomycin-Trough ug/mL 15.3 18.7 18.5       Pharmacy will continue to follow and monitor vancomycin.    Please contact pharmacy at extension [phone] for questions regarding this assessment.    Thank you for the consult,   Maria E Lozano       Patient brief summary:  Audrey Natarajan is a 52 y.o. female initiated on antimicrobial therapy with IV Vancomycin for treatment of skin & soft tissue infection    The patient's current regimen is Vancomycin 2000 mg IV q12h    Drug Allergies:   Review of patient's allergies indicates:   Allergen Reactions    Morphine Other (See Comments)     Patient had a psychotic episode after taking Morphine  Agitation, hallucinations    Penicillins Anaphylaxis     Tolerated cephalosporins in the past    Januvia [sitagliptin] Hives    Carbamazepine Other (See Comments)     hyponatremia       Actual Body Weight:   107 kg    Renal Function:   Estimated Creatinine Clearance: 138.8 mL/min (based on SCr of 0.6 mg/dL).,     Dialysis Method (if applicable):  N/A    CBC (last 72 hours):  Recent Labs   Lab Result Units 01/11/23  0518 01/12/23  0440   WBC K/uL 10.52 9.77   Hemoglobin g/dL 10.3* 10.1*   Hematocrit % 32.1* 31.8*   Platelets K/uL 832* 744*   Gran % % 39.3 35.6*   Lymph % % 42.8 45.5   Mono % % 10.2 12.0   Eosinophil % % 6.0 4.8   Basophil % % 1.0 1.2   Differential Method  Automated Automated       Metabolic Panel (last 72 hours):  Recent  Labs   Lab Result Units 01/11/23  0518 01/12/23  0440   Sodium mmol/L 136 133*   Potassium mmol/L 5.0 4.7   Chloride mmol/L 100 98   CO2 mmol/L 26 27   Glucose mg/dL 211* 148*   BUN mg/dL 14 11   Creatinine mg/dL 0.7 0.6       Vancomycin Administrations:  vancomycin given in the last 96 hours                     vancomycin (VANCOCIN) 2,000 mg in dextrose 5 % 500 mL IVPB (mg) 2,000 mg New Bag 01/13/23 0626     2,000 mg New Bag 01/12/23 1624     2,000 mg New Bag  0726     2,000 mg New Bag 01/11/23 1755     2,000 mg New Bag  0611      Restarted 01/10/23 1700     2,000 mg New Bag  0103    vancomycin 1.5 g in dextrose 5 % 250 mL IVPB (ready to mix) (mg) 1,500 mg New Bag 01/09/23 1315                    Microbiologic Results:  Microbiology Results (last 7 days)       Procedure Component Value Units Date/Time    Blood culture #1 **CANNOT BE ORDERED STAT** [[id number]] Collected: 01/09/23 1205    Order Status: Completed Specimen: Blood from Peripheral, Antecubital, Left Updated: 01/12/23 1503     Blood Culture, Routine No Growth to date      No Growth to date      No Growth to date      No Growth to date    Blood culture #2 **CANNOT BE ORDERED STAT** [[id number]] Collected: 01/09/23 1214    Order Status: Completed Specimen: Blood from Peripheral, Antecubital, Left Updated: 01/12/23 1503     Blood Culture, Routine No Growth to date      No Growth to date      No Growth to date      No Growth to date    Aerobic culture [[id number]]  (Abnormal)  (Susceptibility) Collected: 01/09/23 1647    Order Status: Completed Specimen: Wound from Foot, Left Updated: 01/12/23 0803     Aerobic Bacterial Culture Results called to and read back by:Marcy dewitt 01/12/2023      08:01      METHICILLIN RESISTANT STAPHYLOCOCCUS AUREUS  Moderate  Susceptibility pending        ENTEROCOCCUS SPECIES  Moderate  Identification and susceptibility pending      Gram stain [[id number]] Collected: 01/09/23 1647    Order Status: Completed Specimen: Wound  from Foot, Left Updated: 01/10/23 0803     Gram Stain Result No WBC's      No organisms seen

## 2023-01-13 NOTE — CONSULTS
"[facility]  Infectious Disease  Consult Note    Patient Name: Audrey Natarajan  MRN: [id number]  Admission Date: 1/9/2023  Hospital Length of Stay: 4 days  Attending Physician: Micha Grossman MD  Primary Care Provider: Donaldo Pena MD     Isolation Status: Contact    Patient information was obtained from patient and ER records.      Consults  Assessment/Plan:     * Diabetic ulcer of left midfoot associated with type 2 diabetes mellitus, limited to breakdown of skin  52M with h/o DM with charcot foot, DVT on Eliquis, PAD, ongoing tobacco abuse admitted 1/9 with worsening L leg swelling. Notably she has had several admits over the past several months for L foot infections (12/22 cultures with MDR MRSA treated with bactrim).   Bcx NGTD. MRI from 1/10- Soft tissue ulceration and edema could represent cellulitis.  No focal abscess or acute osteomyelitis is detected. Podiatry sent wound swab, growing MRSA and enterococcus (susceptibilties pending). Per podiatry "Personally believe patient would benefit from longer course of IV abx and placement in LTAC or SNF". Vascular following. ID consulted for "abx recs for discharge. Thanks"    S/p bone biopsy 1/3 with podiatry, path pending. Concern is for OM and failure of po abx (prior bactrim)     Recommendations:   - continue vancomycin. Stop cefepime (spoke with micro and there wasn't enterococcus, just diptheroids in swab.)  - wound care as per podiatry  - please ensure she has adequate perfusion to heal wound  - tobacco cessation     Outpatient Antibiotic Therapy Plan:    Please send referral to [facility].    1) Infection: foot skin/soft tissue infection. Possible OM (path pending)    2) Discharge Antibiotics:    Intravenous antibiotics:  IV vancomycin, pharmacy to dose - TENTATIVE PLAN (pending enterococcus susceptibilities)       3) Therapy Duration:  approx 3-4 weeks and will re-evaluate clinically (could be " extended to 6 weeks if path confirms osteo)    Estimated end date of IV antibiotics: 2/5/23    4) Outpatient Weekly Labs:    Order the following labs to be drawn on Mondays:    CBC   CMP    CRP   Vancomycin trough. Target 15-20    If discharged on vancomycin IV, order the following additional labs to be drawn on Thursdays:   CMP    Vancomycin trough. Target 15-20    If vancomycin trough is not at target (15-20) prior to discharge, schedule vancomycin trough to be drawn before their fourth outpatient dose.    5) Fax Lab Results to Infectious Diseases Provider: Dr Zavala    [facility] Fax Number: [phone]    6) Outpatient Infectious Diseases Follow-up     Follow-up appointment will be arranged by the ID clinic and will be found in the patient's appointments tab.     Prior to discharge, please ensure the patient's follow-up has been scheduled.     If there is still no follow-up scheduled prior to discharge, please send an EPIC message to Leidy Brooks in Infectious Diseases.        Discussed with podiatry              Thank you for your consult. I will sign off. Please contact us if you have any additional questions.    Beverly Zavala MD  Infectious Disease  [facility] [place] [facility] Surg    Subjective:     Principal Problem: Diabetic ulcer of left midfoot associated with type 2 diabetes mellitus, limited to breakdown of skin    HPI:   52M with h/o DM with charcot foot, DVT on Eliquis, PAD, tobacco abuse admitted  1/9 with worsening L leg swelling. Notably she has had several admits over the past several months for L foot infections. Reports wound getting bigger. Denies f/c. Reports compliance with pressure offloading (uses scooter to get around). Prior MRI without OM and was treated with po abx inculding cefadroxil and po bactrim. Says she completed bactrim    Bcx NGTD    MRI from 1/10-   Soft tissue ulceration and edema could represent cellulitis.  See above comments.  No focal abscess or acute  "osteomyelitis is detected.  Recommend follow-up.    Podiatry sent wound swab  Specimen Information: Foot, Left; Wound    0 Result Notes  Component 2 d ago    Aerobic Bacterial Culture  Abnormal   STAPHYLOCOCCUS AUREUS   Moderate   Susceptibility pending           Per podiatry "Personally believe patient would benefit from longer course of IV abx and placement in LTAC or SNF"    JEYSON with  hemodynamically significant stenosis in the left and DPA. Multifocal mild to moderate grade stenoses is suspected throughout the left MIRACLE and, bilateral posterior tibial and peroneal arteris.    Vascular following     Note pt with PCN allergy, but has tolerated keflex and ancef during prior admissions    Smokes cigarettes    ID consulted for "abx recs for discharge. Thanks"      Interval history: NAEO. Going to ltac today. Thinks cefepime causing naueas, not vanc    Past Surgical History:   Procedure Laterality Date    ABDOMINAL SURGERY  2010    gastric sleeve    BILATERAL OOPHORECTOMY Bilateral 1/12/2015    CHOLECYSTECTOMY      DEBRIDEMENT OF FOOT Bilateral 5/10/2022    Procedure: DEBRIDEMENT, FOOT;  Surgeon: Maira De Los Santos DPM;  Location: [facility] OR;  Service: Podiatry;  Laterality: Bilateral;    Green' s filter Right 7/4/2012    Right Neck & Tunneled Down.    HERNIA REPAIR      "[place] [facility] [place] Rep[employer] around th [place].", pt. states    INCISION AND DRAINAGE FOOT Left 12/24/2022    Procedure: INCISION AND DRAINAGE, FOOT;  Surgeon: Fahad Razo DPM;  Location: [facility] OR;  Service: Podiatry;  Laterality: Left;    LAPAROSCOPIC CHOLECYSTECTOMY N/A 9/10/2020    Procedure: CHOLECYSTECTOMY, LAPAROSCOPIC;  Surgeon: Montrell Gutierrez MD;  Location: [facility] OR;  Service: General;  Laterality: N/A;  RN PREOP 9/9----COVID Negative  9/9    OVARIAN CYST REMOVAL  3/13/2014    [place] REMOVAL OF OVARY/TUBE(S)      SPLENECTOMY, TOTAL  July 2003    TONSILLECTOMY      as a child    TYMPANOSTOMY TUBE PLACEMENT  1976    VEIN SURGERY  2003    " Lt leg       Review of patient's allergies indicates:   Allergen Reactions    Morphine Other (See Comments)     Patient had a psychotic episode after taking Morphine  Agitation, hallucinations    Penicillins Anaphylaxis     Tolerated cephalosporins in the past    Januvia [sitagliptin] Hives    Carbamazepine Other (See Comments)     hyponatremia       No current facility-administered medications on file prior to encounter.     Current Outpatient Medications on File Prior to Encounter   Medication Sig    acetaminophen (TYLENOL) 500 MG tablet Take 2 tablets (1,000 mg total) by mouth every 6 (six) hours as needed for Pain.    DUPIXENT  mg/2 mL PnIj Inject into the skin.    gabapentin (NEURONTIN) 300 MG capsule TAKE 2 CAPSULES(600 MG) BY MOUTH TWICE DAILY    lisinopriL 10 MG tablet Take 1 tablet (10 mg total) by mouth once daily.    loratadine (CLARITIN) 10 mg tablet Take 1 tablet (10 mg total) by mouth once daily.    metFORMIN (GLUCOPHAGE) 1000 MG tablet Take 1 tablet (1,000 mg total) by mouth 2 (two) times daily with meals.    methocarbamoL (ROBAXIN) 500 MG Tab Take 500 mg by mouth. Frequency could not be confirmed.    metoprolol tartrate (LOPRESSOR) 25 MG tablet Take 1 tablet (25 mg total) by mouth 2 (two) times daily.    multivitamin Tab Take 1 tablet by mouth once daily.    pantoprazole (PROTONIX) 40 MG tablet Take 1 tablet (40 mg total) by mouth once daily.    pravastatin (PRAVACHOL) 40 MG tablet TAKE 1 TABLET(40 MG) BY MOUTH EVERY EVENING    risperiDONE (RISPERDAL M-TABS) 3 MG disintegrating tablet Take 1 tablet (3 mg total) by mouth 2 (two) times daily.    semaglutide (OZEMPIC) 1 mg/dose (4 mg/3 mL) Inject 1 mg into the skin every 7 days.    traMADoL (ULTRAM) 50 mg tablet Take 1 tablet (50 mg total) by mouth every 8 (eight) hours as needed for Pain.    VYVANSE 40 mg Cap Take 40 mg by mouth once daily.    albuterol (PROVENTIL/VENTOLIN HFA) 90 mcg/actuation inhaler INHALE 2 PUFFS INTO THE  LUNGS EVERY 6 HOURS AS NEEDED FOR WHEEZING. RESCUE    albuterol-ipratropium (DUO-NEB) 2.5 mg-0.5 mg/3 mL nebulizer solution Take 3 mLs by nebulization every 6 (six) hours as needed for Wheezing or Shortness of Breath. Rescue    ammonium lactate (LAC-HYDRIN) 12 % lotion APPL Y ONCE TOPICALLY TWICE DAILY FOR 30 DAYS    apixaban (ELIQUIS) 5 mg Tab Take 1 tablet (5 mg total) by mouth 2 (two) times daily.    aspirin 81 MG Chew Take 1 tablet (81 mg total) by mouth once daily.    bumetanide (BUMEX) 1 MG tablet Take 1 tablet (1 mg total) by mouth once daily.    divalproex (DEPAKOTE) 500 MG TbEC Take 1 tablet (500 mg total) by mouth once daily. PO QAM (Patient taking differently: Take 1,500 mg by mouth every evening. PO QAM)    fluticasone propionate (FLONASE) 50 mcg/actuation nasal spray 2 sprays (100 mcg total) by Each Nostril route daily as needed (Nasal congestion).    fluticasone-salmeterol diskus inhaler 250-50 mcg Inhale 1 puff into the lungs 2 (two) times daily. Controller    hydrOXYzine (ATARAX) 50 MG tablet Take 50 mg by mouth 4 (four) times daily as needed.    nystatin (NYSTOP) powder APPLY TO ABDOMINAL AND BREAST SKIN FOLD TWICE DAILY.    [DISCONTINUED] diclofenac sodium (VOLTAREN) 1 % Gel Apply 2 g topically 4 (four) times daily as needed (Apply to painful area up to 4 times a day as needed for pain). Apply to painful area 4 times a day as needed for pain    [DISCONTINUED] furosemide (LASIX) 20 MG tablet TAKE 1 TABLET(20 MG) BY MOUTH EVERY DAY    [DISCONTINUED] QUEtiapine (SEROQUEL) 200 MG Tab Take 1 tablet (200 mg total) by mouth before breakfast.     Family History       Problem Relation (Age of Onset)    Cataracts Father    Diabetes Father, Paternal Grandfather    Heart disease Father, Paternal Grandfather    Hypertension Father    No Known Problems Mother, Sister, Brother, Maternal Aunt, Maternal Uncle, Paternal Aunt, Paternal Uncle, Maternal Grandfather    Ovarian cancer Maternal Grandmother,  Paternal Grandmother          Tobacco Use    Smoking status: Every Day     Packs/day: 1.00     Years: 37.00     Pack years: 37.00     Types: Cigarettes     Last attempt to quit: 2020     Years since quittin.1    Smokeless tobacco: Never    Tobacco comments:     Enrolled in the [employer] on 5/3/14 ([facility] Member ID # [id number]). Ambulatory referral to Smoking Cessation Program   Substance and Sexual Activity    Alcohol use: No     Alcohol/week: 0.0 standard drinks    Drug use: No    Sexual activity: Yes     Partners: Male     Review of Systems   Constitutional:  Positive for activity change, appetite change and fever. Negative for chills.   HENT:  Positive for congestion. Negative for sinus pressure, sinus pain and trouble swallowing.    Respiratory:  Negative for cough, chest tightness and shortness of breath.    Cardiovascular:  Positive for leg swelling. Negative for chest pain and palpitations.   Gastrointestinal:  Negative for abdominal pain, constipation, diarrhea, nausea and vomiting.   Endocrine: Positive for polyuria.   Genitourinary:  Negative for difficulty urinating.   Musculoskeletal:  Negative for arthralgias and myalgias.   Skin:  Positive for color change, rash and wound.   Neurological:  Positive for numbness. Negative for weakness and headaches.   Hematological:  Negative for adenopathy.   Psychiatric/Behavioral:  Negative for confusion.    Objective:     Vital Signs (Most Recent):  Temp: 98.4 °F (36.9 °C) (23 1148)  Pulse: 90 (23 1148)  Resp: 18 (23 1519)  BP: 135/61 (23 1148)  SpO2: (!) 94 % (23 1148)   Vital Signs (24h Range):  Temp:  [97.5 °F (36.4 °C)-98.4 °F (36.9 °C)] 98.4 °F (36.9 °C)  Pulse:  [75-97] 90  Resp:  [17-20] 18  SpO2:  [93 %-96 %] 94 %  BP: (132-158)/(61-71) 135/61     Weight: 107 kg (235 lb 14.3 oz)  Body mass index is 38.07 kg/m².    Physical Exam  Vitals and nursing note reviewed.   Constitutional:       General: She is not in  acute distress.     Appearance: She is obese. She is not ill-appearing, toxic-appearing or diaphoretic.   HENT:      Head: Normocephalic and atraumatic.      Nose: Nose normal.      Mouth/Throat:      Mouth: Mucous membranes are moist.   Eyes:      General: No scleral icterus.     Conjunctiva/sclera: Conjunctivae normal.   Cardiovascular:      Rate and Rhythm: Normal rate and regular rhythm.      Pulses: Normal pulses.      Heart sounds: Normal heart sounds. No murmur heard.    No gallop.   Pulmonary:      Effort: Pulmonary effort is normal. No respiratory distress.      Breath sounds: Normal breath sounds. No wheezing or rales.      Comments: Room air  Abdominal:      General: Bowel sounds are normal. There is no distension.      Palpations: Abdomen is soft. There is no mass.      Tenderness: There is no abdominal tenderness. There is no guarding.   Musculoskeletal:      Right lower leg: No edema.      Left lower leg: No edema.      Comments: L foot bandaged   Skin:     General: Skin is warm and dry.      Findings: Erythema (left lower leg) and rash present.   Neurological:      Mental Status: She is alert and oriented to person, place, and time.      Sensory: Sensory deficit (bilateral feet) present.      Motor: No weakness.                       Significant Labs: All pertinent labs within the past 24 hours have been reviewed.    Significant Imaging: I have reviewed all pertinent imaging results/findings within the past 24 hours.

## 2023-01-13 NOTE — NURSING
Dressing change completed per order. Patient slept through process, no s;s of pain noted. Will continue to monitor.

## 2023-01-13 NOTE — ASSESSMENT & PLAN NOTE
"52M with h/o DM with charcot foot, DVT on Eliquis, PAD, ongoing tobacco abuse admitted 1/9 with worsening L leg swelling. Notably she has had several admits over the past several months for L foot infections (12/22 cultures with MDR MRSA treated with bactrim).   Bcx NGTD. MRI from 1/10- Soft tissue ulceration and edema could represent cellulitis.  No focal abscess or acute osteomyelitis is detected. Podiatry sent wound swab, growing MRSA and enterococcus (susceptibilties pending). Per podiatry "Personally believe patient would benefit from longer course of IV abx and placement in LTAC or SNF". Vascular following. ID consulted for "abx recs for discharge. Thanks"    S/p bone biopsy 1/3 with podiatry, path pending. Concern is for OM and failure of po abx (prior bactrim)     Recommendations:   - continue vancomycin. Stop cefepime (spoke with micro and there wasn't enterococcus, just diptheroids in swab.)  - wound care as per podiatry  - please ensure she has adequate perfusion to heal wound  - tobacco cessation     Outpatient Antibiotic Therapy Plan:    Please send referral to [facility].    1) Infection: foot skin/soft tissue infection. Possible OM (path pending)    2) Discharge Antibiotics:    Intravenous antibiotics:  IV vancomycin, pharmacy to dose - TENTATIVE PLAN (pending enterococcus susceptibilities)       3) Therapy Duration:  approx 3-4 weeks and will re-evaluate clinically (could be extended to 6 weeks if path confirms osteo)    Estimated end date of IV antibiotics: 2/5/23    4) Outpatient Weekly Labs:    Order the following labs to be drawn on Mondays:    CBC   CMP    CRP   Vancomycin trough. Target 15-20    If discharged on vancomycin IV, order the following additional labs to be drawn on Thursdays:   CMP    Vancomycin trough. Target 15-20    If vancomycin trough is not at target (15-20) prior to discharge, schedule vancomycin trough to be drawn before their fourth " outpatient dose.    5) Fax Lab Results to Infectious Diseases Provider: Dr Zavala    [facility] Fax Number: [phone]    6) Outpatient Infectious Diseases Follow-up     Follow-up appointment will be arranged by the ID clinic and will be found in the patient's appointments tab.     Prior to discharge, please ensure the patient's follow-up has been scheduled.     If there is still no follow-up scheduled prior to discharge, please send an EPIC message to Leidy Brooks in Infectious Diseases.        Discussed with podiatry

## 2023-01-13 NOTE — SUBJECTIVE & OBJECTIVE
"Interval history: NAEO. Going to [facility] today. Thinks cefepime causing naueas, not vanc    Past Surgical History:   Procedure Laterality Date    ABDOMINAL SURGERY  2010    gastric sleeve    BILATERAL OOPHORECTOMY Bilateral 1/12/2015    CHOLECYSTECTOMY      DEBRIDEMENT OF FOOT Bilateral 5/10/2022    Procedure: DEBRIDEMENT, FOOT;  Surgeon: Maira De Los Santos DPM;  Location: [facility] OR;  Service: Podiatry;  Laterality: Bilateral;    Green' s filter Right 7/4/2012    Right Neck & Tunneled Down.    HERNIA REPAIR      "[place] [facility] Repaires around th [place].", pt. states    INCISION AND DRAINAGE FOOT Left 12/24/2022    Procedure: INCISION AND DRAINAGE, FOOT;  Surgeon: Fahad Razo DPM;  Location: [facility] OR;  Service: Podiatry;  Laterality: Left;    LAPAROSCOPIC CHOLECYSTECTOMY N/A 9/10/2020    Procedure: CHOLECYSTECTOMY, LAPAROSCOPIC;  Surgeon: Montrell Gutierrez MD;  Location: [facility] OR;  Service: General;  Laterality: N/A;  RN PREOP 9/9----COVID Negative  9/9    OVARIAN CYST REMOVAL  3/13/2014    NH REMOVAL OF OVARY/TUBE(S)      SPLENECTOMY, TOTAL  July 2003    TONSILLECTOMY      as a child    TYMPANOSTOMY TUBE PLACEMENT  1976    VEIN SURGERY  2003    Lt leg       Review of patient's allergies indicates:   Allergen Reactions    Morphine Other (See Comments)     Patient had a psychotic episode after taking Morphine  Agitation, hallucinations    Penicillins Anaphylaxis     Tolerated cephalosporins in the past    Januvia [sitagliptin] Hives    Carbamazepine Other (See Comments)     hyponatremia       No current facility-administered medications on file prior to encounter.     Current Outpatient Medications on File Prior to Encounter   Medication Sig    acetaminophen (TYLENOL) 500 MG tablet Take 2 tablets (1,000 mg total) by mouth every 6 (six) hours as needed for Pain.    DUPIXENT  mg/2 mL PnIj Inject into the skin.    gabapentin (NEURONTIN) 300 MG capsule TAKE 2 CAPSULES(600 MG) BY MOUTH TWICE DAILY    lisinopriL 10 MG " tablet Take 1 tablet (10 mg total) by mouth once daily.    loratadine (CLARITIN) 10 mg tablet Take 1 tablet (10 mg total) by mouth once daily.    metFORMIN (GLUCOPHAGE) 1000 MG tablet Take 1 tablet (1,000 mg total) by mouth 2 (two) times daily with meals.    methocarbamoL (ROBAXIN) 500 MG Tab Take 500 mg by mouth. Frequency could not be confirmed.    metoprolol tartrate (LOPRESSOR) 25 MG tablet Take 1 tablet (25 mg total) by mouth 2 (two) times daily.    multivitamin Tab Take 1 tablet by mouth once daily.    pantoprazole (PROTONIX) 40 MG tablet Take 1 tablet (40 mg total) by mouth once daily.    pravastatin (PRAVACHOL) 40 MG tablet TAKE 1 TABLET(40 MG) BY MOUTH EVERY EVENING    risperiDONE (RISPERDAL M-TABS) 3 MG disintegrating tablet Take 1 tablet (3 mg total) by mouth 2 (two) times daily.    semaglutide (OZEMPIC) 1 mg/dose (4 mg/3 mL) Inject 1 mg into the skin every 7 days.    traMADoL (ULTRAM) 50 mg tablet Take 1 tablet (50 mg total) by mouth every 8 (eight) hours as needed for Pain.    VYVANSE 40 mg Cap Take 40 mg by mouth once daily.    albuterol (PROVENTIL/VENTOLIN HFA) 90 mcg/actuation inhaler INHALE 2 PUFFS INTO THE LUNGS EVERY 6 HOURS AS NEEDED FOR WHEEZING. RESCUE    albuterol-ipratropium (DUO-NEB) 2.5 mg-0.5 mg/3 mL nebulizer solution Take 3 mLs by nebulization every 6 (six) hours as needed for Wheezing or Shortness of Breath. Rescue    ammonium lactate (LAC-HYDRIN) 12 % lotion APPL Y ONCE TOPICALLY TWICE DAILY FOR 30 DAYS    apixaban (ELIQUIS) 5 mg Tab Take 1 tablet (5 mg total) by mouth 2 (two) times daily.    aspirin 81 MG Chew Take 1 tablet (81 mg total) by mouth once daily.    bumetanide (BUMEX) 1 MG tablet Take 1 tablet (1 mg total) by mouth once daily.    divalproex (DEPAKOTE) 500 MG TbEC Take 1 tablet (500 mg total) by mouth once daily. PO QAM (Patient taking differently: Take 1,500 mg by mouth every evening. PO QAM)    fluticasone propionate (FLONASE) 50 mcg/actuation nasal spray 2 sprays (100  mcg total) by Each Nostril route daily as needed (Nasal congestion).    fluticasone-salmeterol diskus inhaler 250-50 mcg Inhale 1 puff into the lungs 2 (two) times daily. Controller    hydrOXYzine (ATARAX) 50 MG tablet Take 50 mg by mouth 4 (four) times daily as needed.    nystatin (NYSTOP) powder APPLY TO ABDOMINAL AND BREAST SKIN FOLD TWICE DAILY.    [DISCONTINUED] diclofenac sodium (VOLTAREN) 1 % Gel Apply 2 g topically 4 (four) times daily as needed (Apply to painful area up to 4 times a day as needed for pain). Apply to painful area 4 times a day as needed for pain    [DISCONTINUED] furosemide (LASIX) 20 MG tablet TAKE 1 TABLET(20 MG) BY MOUTH EVERY DAY    [DISCONTINUED] QUEtiapine (SEROQUEL) 200 MG Tab Take 1 tablet (200 mg total) by mouth before breakfast.     Family History       Problem Relation (Age of Onset)    Cataracts Father    Diabetes Father, Paternal Grandfather    Heart disease Father, Paternal Grandfather    Hypertension Father    No Known Problems Mother, Sister, Brother, Maternal Aunt, Maternal Uncle, Paternal Aunt, Paternal Uncle, Maternal Grandfather    Ovarian cancer Maternal Grandmother, Paternal Grandmother          Tobacco Use    Smoking status: Every Day     Packs/day: 1.00     Years: 37.00     Pack years: 37.00     Types: Cigarettes     Last attempt to quit: 2020     Years since quittin.1    Smokeless tobacco: Never    Tobacco comments:     Enrolled in the [employer] on 5/3/14 ([facility] Member ID # [id number]). Ambulatory referral to Smoking Cessation Program   Substance and Sexual Activity    Alcohol use: No     Alcohol/week: 0.0 standard drinks    Drug use: No    Sexual activity: Yes     Partners: Male     Review of Systems   Constitutional:  Positive for activity change, appetite change and fever. Negative for chills.   HENT:  Positive for congestion. Negative for sinus pressure, sinus pain and trouble swallowing.    Respiratory:  Negative for cough, chest tightness and  shortness of breath.    Cardiovascular:  Positive for leg swelling. Negative for chest pain and palpitations.   Gastrointestinal:  Negative for abdominal pain, constipation, diarrhea, nausea and vomiting.   Endocrine: Positive for polyuria.   Genitourinary:  Negative for difficulty urinating.   Musculoskeletal:  Negative for arthralgias and myalgias.   Skin:  Positive for color change, rash and wound.   Neurological:  Positive for numbness. Negative for weakness and headaches.   Hematological:  Negative for adenopathy.   Psychiatric/Behavioral:  Negative for confusion.    Objective:     Vital Signs (Most Recent):  Temp: 98.4 °F (36.9 °C) (01/13/23 1148)  Pulse: 90 (01/13/23 1148)  Resp: 18 (01/13/23 1519)  BP: 135/61 (01/13/23 1148)  SpO2: (!) 94 % (01/13/23 1148)   Vital Signs (24h Range):  Temp:  [97.5 °F (36.4 °C)-98.4 °F (36.9 °C)] 98.4 °F (36.9 °C)  Pulse:  [75-97] 90  Resp:  [17-20] 18  SpO2:  [93 %-96 %] 94 %  BP: (132-158)/(61-71) 135/61     Weight: 107 kg (235 lb 14.3 oz)  Body mass index is 38.07 kg/m².    Physical Exam  Vitals and nursing note reviewed.   Constitutional:       General: She is not in acute distress.     Appearance: She is obese. She is not ill-appearing, toxic-appearing or diaphoretic.   HENT:      Head: Normocephalic and atraumatic.      Nose: Nose normal.      Mouth/Throat:      Mouth: Mucous membranes are moist.   Eyes:      General: No scleral icterus.     Conjunctiva/sclera: Conjunctivae normal.   Cardiovascular:      Rate and Rhythm: Normal rate and regular rhythm.      Pulses: Normal pulses.      Heart sounds: Normal heart sounds. No murmur heard.    No gallop.   Pulmonary:      Effort: Pulmonary effort is normal. No respiratory distress.      Breath sounds: Normal breath sounds. No wheezing or rales.      Comments: Room air  Abdominal:      General: Bowel sounds are normal. There is no distension.      Palpations: Abdomen is soft. There is no mass.      Tenderness: There is no  abdominal tenderness. There is no guarding.   Musculoskeletal:      Right lower leg: No edema.      Left lower leg: No edema.      Comments: L foot bandaged   Skin:     General: Skin is warm and dry.      Findings: Erythema (left lower leg) and rash present.   Neurological:      Mental Status: She is alert and oriented to person, place, and time.      Sensory: Sensory deficit (bilateral feet) present.      Motor: No weakness.                       Significant Labs: All pertinent labs within the past 24 hours have been reviewed.    Significant Imaging: I have reviewed all pertinent imaging results/findings within the past 24 hours.

## 2023-01-13 NOTE — ASSESSMENT & PLAN NOTE
Admitted with worsening L plantar DM foot wound. S/p I&D in 12/2022 with bone cultures no growth and treated for skin/soft tissue infection with bactrim x10 days. Worsening with enlarging wound and cellulitis involving the foot and the leg.   - On vanc/ceftriaxone  - Podiatry consulted     Patient's FSGs are uncontrolled due to hyperglycemia on current medication regimen.  Last A1c reviewed-   Lab Results   Component Value Date    HGBA1C 7.1 (H) 12/22/2022     Most recent fingerstick glucose reviewed-   Recent Labs   Lab 01/12/23  0702 01/12/23  1100 01/12/23  1618 01/12/23 2057   POCTGLUCOSE 134* 253* 198* 248*     Current correctional scale  Low  Maintain anti-hyperglycemic dose as follows-   Antihyperglycemics (From admission, onward)    Start     Stop Route Frequency Ordered    01/09/23 2100  insulin detemir U-100 pen 10 Units         -- SubQ Nightly 01/09/23 1620    01/09/23 1730  insulin aspart U-100 pen 3 Units         -- SubQ 3 times daily with meals 01/09/23 1620    01/09/23 1719  insulin aspart U-100 pen 0-5 Units         -- SubQ Before meals & nightly PRN 01/09/23 1620        Hold Oral hypoglycemics while patient is in the hospital.  Podiatry consulted. Blood cultures are NG  Wound with Staph. ID consulted for recs for discharge    MRSA and Enteroccocus on wound cultures. Follow podiatry/ID recs.

## 2023-01-13 NOTE — SUBJECTIVE & OBJECTIVE
Interval History: No new issues     Review of Systems   Constitutional:  Positive for activity change. Negative for chills, diaphoresis, fatigue and fever.   HENT:  Negative for congestion and dental problem.    Eyes:  Negative for discharge and itching.   Respiratory:  Negative for apnea.    Cardiovascular:  Negative for chest pain.   Gastrointestinal:  Negative for abdominal pain.   Genitourinary:  Negative for difficulty urinating and dyspareunia.   Skin:  Negative for color change and pallor.   Neurological:  Negative for facial asymmetry.   Psychiatric/Behavioral:  Negative for agitation and behavioral problems.    Objective:     Vital Signs (Most Recent):  Temp: 98.2 °F (36.8 °C) (01/13/23 0445)  Pulse: 80 (01/13/23 0445)  Resp: 18 (01/13/23 0445)  BP: (!) 142/66 (01/13/23 0445)  SpO2: 96 % (01/13/23 0445)   Vital Signs (24h Range):  Temp:  [98 °F (36.7 °C)-99.5 °F (37.5 °C)] 98.2 °F (36.8 °C)  Pulse:  [] 80  Resp:  [18-20] 18  SpO2:  [93 %-96 %] 96 %  BP: (132-144)/(63-69) 142/66     Weight: 107 kg (235 lb 14.3 oz)  Body mass index is 38.07 kg/m².    Intake/Output Summary (Last 24 hours) at 1/13/2023 0613  Last data filed at 1/12/2023 1730  Gross per 24 hour   Intake 880 ml   Output 2000 ml   Net -1120 ml      Physical Exam  Vitals and nursing note reviewed.   Constitutional:       General: She is not in acute distress.     Appearance: Normal appearance. She is obese. She is not ill-appearing or toxic-appearing.   HENT:      Head: Normocephalic and atraumatic.      Nose: Nose normal.      Mouth/Throat:      Pharynx: Oropharynx is clear.   Eyes:      Conjunctiva/sclera: Conjunctivae normal.   Cardiovascular:      Rate and Rhythm: Normal rate and regular rhythm.   Pulmonary:      Effort: Pulmonary effort is normal. No respiratory distress.   Skin:     General: Skin is dry.   Neurological:      Mental Status: She is alert and oriented to person, place, and time.   Psychiatric:         Mood and Affect: Mood  normal.         Behavior: Behavior normal.       Significant Labs: All pertinent labs within the past 24 hours have been reviewed.  BMP:   Recent Labs   Lab 01/12/23 0440   *   *   K 4.7   CL 98   CO2 27   BUN 11   CREATININE 0.6   CALCIUM 9.4     CBC:   Recent Labs   Lab 01/12/23 0440   WBC 9.77   HGB 10.1*   HCT 31.8*   *       Significant Imaging:

## 2023-01-13 NOTE — NURSING
Patient resting in bed, respirations e/u, no family at bedside. Reinforced use of call light for needs. CLWR, BR upx3, bed low, locked in position. No s/s of pain nor distress. Safety maintained, will continue to monitor.

## 2023-01-14 LAB
GRAM STN SPEC: NORMAL
GRAM STN SPEC: NORMAL
POCT GLUCOSE: 169 MG/DL (ref 70–110)
POCT GLUCOSE: 213 MG/DL (ref 70–110)
POCT GLUCOSE: 228 MG/DL (ref 70–110)
POCT GLUCOSE: 262 MG/DL (ref 70–110)
POCT GLUCOSE: 292 MG/DL (ref 70–110)
VANCOMYCIN TROUGH SERPL-MCNC: 16.2 UG/ML (ref 10–22)

## 2023-01-14 PROCEDURE — 25000003 PHARM REV CODE 250: Performed by: HOSPITALIST

## 2023-01-14 PROCEDURE — A4216 STERILE WATER/SALINE, 10 ML: HCPCS | Performed by: INTERNAL MEDICINE

## 2023-01-14 PROCEDURE — 11000001 HC ACUTE MED/SURG PRIVATE ROOM

## 2023-01-14 PROCEDURE — 80202 ASSAY OF VANCOMYCIN: CPT | Performed by: INTERNAL MEDICINE

## 2023-01-14 PROCEDURE — 63600175 PHARM REV CODE 636 W HCPCS: Performed by: HOSPITALIST

## 2023-01-14 PROCEDURE — S4991 NICOTINE PATCH NONLEGEND: HCPCS | Performed by: INTERNAL MEDICINE

## 2023-01-14 PROCEDURE — 27000207 HC ISOLATION

## 2023-01-14 PROCEDURE — 25000003 PHARM REV CODE 250: Performed by: INTERNAL MEDICINE

## 2023-01-14 PROCEDURE — 36415 COLL VENOUS BLD VENIPUNCTURE: CPT | Performed by: INTERNAL MEDICINE

## 2023-01-14 RX ORDER — BISACODYL 10 MG
10 SUPPOSITORY, RECTAL RECTAL DAILY PRN
Status: DISCONTINUED | OUTPATIENT
Start: 2023-01-14 | End: 2023-01-17 | Stop reason: HOSPADM

## 2023-01-14 RX ORDER — POLYETHYLENE GLYCOL 3350 17 G/17G
17 POWDER, FOR SOLUTION ORAL DAILY
Status: DISCONTINUED | OUTPATIENT
Start: 2023-01-14 | End: 2023-01-17 | Stop reason: HOSPADM

## 2023-01-14 RX ORDER — CETIRIZINE HYDROCHLORIDE 5 MG/1
5 TABLET ORAL DAILY
Status: DISCONTINUED | OUTPATIENT
Start: 2023-01-14 | End: 2023-01-17 | Stop reason: HOSPADM

## 2023-01-14 RX ORDER — ADHESIVE BANDAGE
30 BANDAGE TOPICAL ONCE
Status: COMPLETED | OUTPATIENT
Start: 2023-01-14 | End: 2023-01-14

## 2023-01-14 RX ORDER — ADHESIVE BANDAGE
30 BANDAGE TOPICAL DAILY
Status: DISCONTINUED | OUTPATIENT
Start: 2023-01-14 | End: 2023-01-17 | Stop reason: HOSPADM

## 2023-01-14 RX ADMIN — Medication 10 ML: at 12:01

## 2023-01-14 RX ADMIN — VANCOMYCIN HYDROCHLORIDE 2000 MG: 1 INJECTION, POWDER, LYOPHILIZED, FOR SOLUTION INTRAVENOUS at 06:01

## 2023-01-14 RX ADMIN — INSULIN ASPART 1 UNITS: 100 INJECTION, SOLUTION INTRAVENOUS; SUBCUTANEOUS at 08:01

## 2023-01-14 RX ADMIN — ONDANSETRON 4 MG: 2 INJECTION INTRAMUSCULAR; INTRAVENOUS at 06:01

## 2023-01-14 RX ADMIN — DIPHENHYDRAMINE HYDROCHLORIDE 25 MG: 25 CAPSULE ORAL at 05:01

## 2023-01-14 RX ADMIN — SENNOSIDES AND DOCUSATE SODIUM 1 TABLET: 50; 8.6 TABLET ORAL at 08:01

## 2023-01-14 RX ADMIN — Medication 10 ML: at 06:01

## 2023-01-14 RX ADMIN — INSULIN ASPART 3 UNITS: 100 INJECTION, SOLUTION INTRAVENOUS; SUBCUTANEOUS at 06:01

## 2023-01-14 RX ADMIN — INSULIN ASPART 3 UNITS: 100 INJECTION, SOLUTION INTRAVENOUS; SUBCUTANEOUS at 08:01

## 2023-01-14 RX ADMIN — OXYCODONE AND ACETAMINOPHEN 1 TABLET: 10; 325 TABLET ORAL at 08:01

## 2023-01-14 RX ADMIN — VANCOMYCIN HYDROCHLORIDE 2000 MG: 1 INJECTION, POWDER, LYOPHILIZED, FOR SOLUTION INTRAVENOUS at 05:01

## 2023-01-14 RX ADMIN — GUAIFENESIN 200 MG: 200 SOLUTION ORAL at 05:01

## 2023-01-14 RX ADMIN — INSULIN ASPART 3 UNITS: 100 INJECTION, SOLUTION INTRAVENOUS; SUBCUTANEOUS at 12:01

## 2023-01-14 RX ADMIN — Medication 1 PATCH: at 08:01

## 2023-01-14 RX ADMIN — INSULIN DETEMIR 10 UNITS: 100 INJECTION, SOLUTION SUBCUTANEOUS at 08:01

## 2023-01-14 RX ADMIN — INSULIN ASPART 2 UNITS: 100 INJECTION, SOLUTION INTRAVENOUS; SUBCUTANEOUS at 08:01

## 2023-01-14 RX ADMIN — LIDOCAINE 1 PATCH: 50 PATCH CUTANEOUS at 10:01

## 2023-01-14 RX ADMIN — BUMETANIDE 1 MG: 1 TABLET ORAL at 08:01

## 2023-01-14 RX ADMIN — FERROUS SULFATE TAB 325 MG (65 MG ELEMENTAL FE) 1 EACH: 325 (65 FE) TAB at 08:01

## 2023-01-14 RX ADMIN — MICONAZOLE NITRATE: 20 CREAM TOPICAL at 09:01

## 2023-01-14 RX ADMIN — MAGNESIUM HYDROXIDE 2400 MG: 400 SUSPENSION ORAL at 02:01

## 2023-01-14 RX ADMIN — RISPERIDONE 3 MG: 1 TABLET ORAL at 08:01

## 2023-01-14 RX ADMIN — APIXABAN 5 MG: 5 TABLET, FILM COATED ORAL at 08:01

## 2023-01-14 RX ADMIN — GUAIFENESIN 200 MG: 200 SOLUTION ORAL at 02:01

## 2023-01-14 RX ADMIN — DIPHENHYDRAMINE HYDROCHLORIDE 25 MG: 25 CAPSULE ORAL at 06:01

## 2023-01-14 RX ADMIN — MAGNESIUM HYDROXIDE 2400 MG: 400 SUSPENSION ORAL at 10:01

## 2023-01-14 RX ADMIN — MICONAZOLE NITRATE: 20 CREAM TOPICAL at 08:01

## 2023-01-14 RX ADMIN — DIVALPROEX SODIUM 1500 MG: 250 TABLET, DELAYED RELEASE ORAL at 08:01

## 2023-01-14 RX ADMIN — PROCHLORPERAZINE EDISYLATE 5 MG: 5 INJECTION INTRAMUSCULAR; INTRAVENOUS at 08:01

## 2023-01-14 RX ADMIN — OXYCODONE AND ACETAMINOPHEN 1 TABLET: 10; 325 TABLET ORAL at 12:01

## 2023-01-14 RX ADMIN — OXYCODONE AND ACETAMINOPHEN 1 TABLET: 10; 325 TABLET ORAL at 03:01

## 2023-01-14 RX ADMIN — POLYETHYLENE GLYCOL 3350 17 G: 17 POWDER, FOR SOLUTION ORAL at 02:01

## 2023-01-14 RX ADMIN — GABAPENTIN 600 MG: 300 CAPSULE ORAL at 08:01

## 2023-01-14 RX ADMIN — CETIRIZINE HYDROCHLORIDE 5 MG: 5 TABLET ORAL at 10:01

## 2023-01-14 RX ADMIN — Medication 10 ML: at 05:01

## 2023-01-14 RX ADMIN — MUPIROCIN: 20 OINTMENT TOPICAL at 08:01

## 2023-01-14 RX ADMIN — PRAVASTATIN SODIUM 40 MG: 40 TABLET ORAL at 08:01

## 2023-01-14 NOTE — PLAN OF CARE
Problem: Diabetes Comorbidity  Goal: Blood Glucose Level Within Targeted Range  Intervention: Monitor and Manage Glycemia  Flowsheets (Taken 1/13/2023 1944)  Glycemic Management:   blood glucose monitored   supplemental insulin given     Problem: Pain Acute  Goal: Acceptable Pain Control and Functional Ability  Intervention: Develop Pain Management Plan  Flowsheets (Taken 1/13/2023 1945)  Pain Management Interventions:   awakened for pain meds per patient request   diversional activity provided     Problem: Pain Acute  Goal: Acceptable Pain Control and Functional Ability  Intervention: Prevent or Manage Pain  Flowsheets (Taken 1/13/2023 1945)  Medication Review/Management:   medications reviewed   high-risk medications identified

## 2023-01-14 NOTE — PLAN OF CARE
Problem: Skin Injury Risk Increased  Goal: Skin Health and Integrity  Outcome: Ongoing, Progressing     Problem: Adult Inpatient Plan of Care  Goal: Plan of Care Review  Outcome: Ongoing, Progressing  Goal: Patient-Specific Goal (Individualized)  Outcome: Ongoing, Progressing  Goal: Absence of Hospital-Acquired Illness or Injury  Outcome: Ongoing, Progressing  Goal: Optimal Comfort and Wellbeing  Outcome: Ongoing, Progressing  Goal: Readiness for Transition of Care  Outcome: Ongoing, Progressing     Problem: Infection  Goal: Absence of Infection Signs and Symptoms  Outcome: Ongoing, Progressing     Problem: Diabetes Comorbidity  Goal: Blood Glucose Level Within Targeted Range  Outcome: Ongoing, Progressing     Problem: Impaired Wound Healing  Goal: Optimal Wound Healing  Outcome: Ongoing, Progressing     Problem: Pain Acute  Goal: Acceptable Pain Control and Functional Ability  Outcome: Ongoing, Progressing

## 2023-01-14 NOTE — PROGRESS NOTES
[facility] Medicine  Telemedicine Consult Note       Ms. Audrey Natarajan has been accepted for transfer to [facility] and will be followed through telemedicine services beginning at 7 AM.      Mariam Montano MD  [facility] Medicine Staff

## 2023-01-15 LAB
POCT GLUCOSE: 306 MG/DL (ref 70–110)
POCT GLUCOSE: 316 MG/DL (ref 70–110)
POCT GLUCOSE: 324 MG/DL (ref 70–110)
POCT GLUCOSE: 331 MG/DL (ref 70–110)

## 2023-01-15 PROCEDURE — 27000207 HC ISOLATION

## 2023-01-15 PROCEDURE — 63600175 PHARM REV CODE 636 W HCPCS: Performed by: HOSPITALIST

## 2023-01-15 PROCEDURE — 11000001 HC ACUTE MED/SURG PRIVATE ROOM

## 2023-01-15 PROCEDURE — A4216 STERILE WATER/SALINE, 10 ML: HCPCS | Performed by: INTERNAL MEDICINE

## 2023-01-15 PROCEDURE — 25000003 PHARM REV CODE 250: Performed by: HOSPITALIST

## 2023-01-15 PROCEDURE — S4991 NICOTINE PATCH NONLEGEND: HCPCS | Performed by: INTERNAL MEDICINE

## 2023-01-15 PROCEDURE — 25000003 PHARM REV CODE 250: Performed by: INTERNAL MEDICINE

## 2023-01-15 RX ORDER — INSULIN ASPART 100 [IU]/ML
5 INJECTION, SOLUTION INTRAVENOUS; SUBCUTANEOUS
Status: DISCONTINUED | OUTPATIENT
Start: 2023-01-15 | End: 2023-01-17

## 2023-01-15 RX ADMIN — MICONAZOLE NITRATE: 20 CREAM TOPICAL at 09:01

## 2023-01-15 RX ADMIN — APIXABAN 5 MG: 5 TABLET, FILM COATED ORAL at 08:01

## 2023-01-15 RX ADMIN — DIVALPROEX SODIUM 1500 MG: 250 TABLET, DELAYED RELEASE ORAL at 08:01

## 2023-01-15 RX ADMIN — MUPIROCIN: 20 OINTMENT TOPICAL at 09:01

## 2023-01-15 RX ADMIN — VANCOMYCIN HYDROCHLORIDE 2000 MG: 1 INJECTION, POWDER, LYOPHILIZED, FOR SOLUTION INTRAVENOUS at 05:01

## 2023-01-15 RX ADMIN — PRAVASTATIN SODIUM 40 MG: 40 TABLET ORAL at 08:01

## 2023-01-15 RX ADMIN — Medication 10 ML: at 05:01

## 2023-01-15 RX ADMIN — POLYETHYLENE GLYCOL 3350 17 G: 17 POWDER, FOR SOLUTION ORAL at 08:01

## 2023-01-15 RX ADMIN — FERROUS SULFATE TAB 325 MG (65 MG ELEMENTAL FE) 1 EACH: 325 (65 FE) TAB at 08:01

## 2023-01-15 RX ADMIN — INSULIN ASPART 4 UNITS: 100 INJECTION, SOLUTION INTRAVENOUS; SUBCUTANEOUS at 05:01

## 2023-01-15 RX ADMIN — OXYCODONE AND ACETAMINOPHEN 1 TABLET: 10; 325 TABLET ORAL at 01:01

## 2023-01-15 RX ADMIN — RISPERIDONE 3 MG: 1 TABLET ORAL at 08:01

## 2023-01-15 RX ADMIN — CETIRIZINE HYDROCHLORIDE 5 MG: 5 TABLET ORAL at 08:01

## 2023-01-15 RX ADMIN — INSULIN ASPART 3 UNITS: 100 INJECTION, SOLUTION INTRAVENOUS; SUBCUTANEOUS at 08:01

## 2023-01-15 RX ADMIN — INSULIN ASPART 2 UNITS: 100 INJECTION, SOLUTION INTRAVENOUS; SUBCUTANEOUS at 09:01

## 2023-01-15 RX ADMIN — Medication 10 ML: at 11:01

## 2023-01-15 RX ADMIN — INSULIN ASPART 3 UNITS: 100 INJECTION, SOLUTION INTRAVENOUS; SUBCUTANEOUS at 11:01

## 2023-01-15 RX ADMIN — GUAIFENESIN 200 MG: 200 SOLUTION ORAL at 08:01

## 2023-01-15 RX ADMIN — OXYCODONE AND ACETAMINOPHEN 1 TABLET: 10; 325 TABLET ORAL at 08:01

## 2023-01-15 RX ADMIN — GABAPENTIN 600 MG: 300 CAPSULE ORAL at 08:01

## 2023-01-15 RX ADMIN — INSULIN ASPART 5 UNITS: 100 INJECTION, SOLUTION INTRAVENOUS; SUBCUTANEOUS at 05:01

## 2023-01-15 RX ADMIN — Medication 10 ML: at 06:01

## 2023-01-15 RX ADMIN — BUMETANIDE 1 MG: 1 TABLET ORAL at 08:01

## 2023-01-15 RX ADMIN — VANCOMYCIN HYDROCHLORIDE 2000 MG: 1 INJECTION, POWDER, LYOPHILIZED, FOR SOLUTION INTRAVENOUS at 06:01

## 2023-01-15 RX ADMIN — INSULIN ASPART 4 UNITS: 100 INJECTION, SOLUTION INTRAVENOUS; SUBCUTANEOUS at 11:01

## 2023-01-15 RX ADMIN — SENNOSIDES AND DOCUSATE SODIUM 1 TABLET: 50; 8.6 TABLET ORAL at 08:01

## 2023-01-15 RX ADMIN — LIDOCAINE 1 PATCH: 50 PATCH CUTANEOUS at 11:01

## 2023-01-15 RX ADMIN — MICONAZOLE NITRATE: 20 CREAM TOPICAL at 08:01

## 2023-01-15 RX ADMIN — MAGNESIUM HYDROXIDE 2400 MG: 400 SUSPENSION ORAL at 08:01

## 2023-01-15 RX ADMIN — Medication 1 PATCH: at 08:01

## 2023-01-15 RX ADMIN — Medication 10 ML: at 12:01

## 2023-01-15 NOTE — SUBJECTIVE & OBJECTIVE
Interval History: Pt afebrile and HDS. Stable on room air. Continue wound care. Continue vanc. PICC line in place. Pending placement to LTAC. Medically stable for DC.      Review of Systems   Constitutional:  Positive for activity change and fatigue. Negative for fever.   Respiratory:  Negative for shortness of breath.    Cardiovascular:  Negative for chest pain.   Gastrointestinal:  Negative for abdominal pain.   Skin:  Positive for wound.   Neurological:  Negative for dizziness and headaches.   All other systems reviewed and are negative.  Objective:     Vital Signs (Most Recent):  Temp: 98.6 °F (37 °C) (01/15/23 1154)  Pulse: 98 (01/15/23 1154)  Resp: 20 (01/15/23 1154)  BP: (!) 166/80 (01/15/23 1154)  SpO2: 96 % (01/15/23 1154) Vital Signs (24h Range):  Temp:  [97 °F (36.1 °C)-98.6 °F (37 °C)] 98.6 °F (37 °C)  Pulse:  [86-99] 98  Resp:  [16-20] 20  SpO2:  [92 %-99 %] 96 %  BP: (134-166)/(62-80) 166/80     Weight: 107 kg (235 lb 14.3 oz)  Body mass index is 38.07 kg/m².    Intake/Output Summary (Last 24 hours) at 1/15/2023 1313  Last data filed at 1/15/2023 0718  Gross per 24 hour   Intake 1252.74 ml   Output --   Net 1252.74 ml        Physical Exam  Vitals and nursing note reviewed.   Constitutional:       Appearance: Normal appearance.   HENT:      Head: Normocephalic and atraumatic.   Eyes:      Extraocular Movements: Extraocular movements intact.      Pupils: Pupils are equal, round, and reactive to light.   Cardiovascular:      Rate and Rhythm: Normal rate and regular rhythm.   Pulmonary:      Effort: Pulmonary effort is normal. No respiratory distress.   Abdominal:      General: There is no distension.   Musculoskeletal:         General: Normal range of motion.      Cervical back: Normal range of motion.   Skin:     Comments: L foot wound   Neurological:      General: No focal deficit present.      Mental Status: She is alert and oriented to person, place, and time.   Psychiatric:         Mood and Affect:  Mood normal.         Behavior: Behavior normal.       Significant Labs: All pertinent labs within the past 24 hours have been reviewed.  CBC: No results for input(s): WBC, HGB, HCT, PLT in the last 48 hours.  CMP: No results for input(s): NA, K, CL, CO2, GLU, BUN, CREATININE, CALCIUM, PROT, ALBUMIN, BILITOT, ALKPHOS, AST, ALT, ANIONGAP, EGFRNONAA in the last 48 hours.    Invalid input(s): ESTGFAFRICA    Significant Imaging: I have reviewed all pertinent imaging results/findings within the past 24 hours.

## 2023-01-15 NOTE — PROGRESS NOTES
[facility] Medicine  Telemedicine Progress Note    Patient Name: Audrey Natarajan  MRN: [id number]  Patient Class: IP- Inpatient   Admission Date: 1/9/2023  Length of Stay: 6 days  Attending Physician: Mariam Montano MD  Primary Care Provider: Donaldo Pena MD          Subjective:     Principal Problem:Diabetic ulcer of left midfoot associated with type 2 diabetes mellitus, limited to breakdown of skin        HPI:  Ms Audrey Natarajan is a 52 y.o. woman with DM who presents with worsening L leg swelling.     She was recently admitted 12/22-27/2022 with worsening L plantar foot ulceration. S/p debridement by Podiatry. Bone cultures no growth. Wound culture with MRSA. Seen by ID who recommended bactrim.    She took the bactrim as prescribed with last dose taken 1/5/2023. She noticed improvement in swelling and erythema on bactrim but then noticed worsening swelling and erythema to the L foot and leg. She followed up in wound care on 12/29 and then again on 1/6. Per notes on 1/6, wound had doubled in size.     She has noticed decreased appetite, increased thirst, increased urination, and high glucoses. She does smoke cigarettes.           Overview/Hospital Course:  Patient admitted for L diabetic foot ulcer. Podiatry consulted as well as ID. Patient grew out MRSA and Enterococcus on wound cultures       Interval History: Pt afebrile and HDS. Stable on room air. Continue wound care. Continue vanc. PICC line in place. Pending placement to LTAC. Medically stable for DC.      Review of Systems   Constitutional:  Positive for activity change and fatigue. Negative for fever.   Respiratory:  Negative for shortness of breath.    Cardiovascular:  Negative for chest pain.   Gastrointestinal:  Negative for abdominal pain.   Skin:  Positive for wound.   Neurological:  Negative for dizziness and headaches.   All other systems reviewed and are negative.  Objective:     Vital Signs (Most  Recent):  Temp: 98.6 °F (37 °C) (01/15/23 1154)  Pulse: 98 (01/15/23 1154)  Resp: 20 (01/15/23 1154)  BP: (!) 166/80 (01/15/23 1154)  SpO2: 96 % (01/15/23 1154) Vital Signs (24h Range):  Temp:  [97 °F (36.1 °C)-98.6 °F (37 °C)] 98.6 °F (37 °C)  Pulse:  [86-99] 98  Resp:  [16-20] 20  SpO2:  [92 %-99 %] 96 %  BP: (134-166)/(62-80) 166/80     Weight: 107 kg (235 lb 14.3 oz)  Body mass index is 38.07 kg/m².    Intake/Output Summary (Last 24 hours) at 1/15/2023 1313  Last data filed at 1/15/2023 0718  Gross per 24 hour   Intake 1252.74 ml   Output --   Net 1252.74 ml        Physical Exam  Vitals and nursing note reviewed.   Constitutional:       Appearance: Normal appearance.   HENT:      Head: Normocephalic and atraumatic.   Eyes:      Extraocular Movements: Extraocular movements intact.      Pupils: Pupils are equal, round, and reactive to light.   Cardiovascular:      Rate and Rhythm: Normal rate and regular rhythm.   Pulmonary:      Effort: Pulmonary effort is normal. No respiratory distress.   Abdominal:      General: There is no distension.   Musculoskeletal:         General: Normal range of motion.      Cervical back: Normal range of motion.   Skin:     Comments: L foot wound   Neurological:      General: No focal deficit present.      Mental Status: She is alert and oriented to person, place, and time.   Psychiatric:         Mood and Affect: Mood normal.         Behavior: Behavior normal.       Significant Labs: All pertinent labs within the past 24 hours have been reviewed.  CBC: No results for input(s): WBC, HGB, HCT, PLT in the last 48 hours.  CMP: No results for input(s): NA, K, CL, CO2, GLU, BUN, CREATININE, CALCIUM, PROT, ALBUMIN, BILITOT, ALKPHOS, AST, ALT, ANIONGAP, EGFRNONAA in the last 48 hours.    Invalid input(s): [place]    Significant Imaging: I have reviewed all pertinent imaging results/findings within the past 24 hours.      Assessment/Plan:      * Diabetic ulcer of left midfoot associated  with type 2 diabetes mellitus, limited to breakdown of skin  Admitted with worsening L plantar DM foot wound. S/p I&D in 12/2022 with bone cultures no growth and treated for skin/soft tissue infection with bactrim x10 days. Worsening with enlarging wound and cellulitis involving the foot and the leg.   - On vanc, ctx discotninued  - Podiatry consulted     Patient's FSGs are uncontrolled due to hyperglycemia on current medication regimen.  Last A1c reviewed-   Lab Results   Component Value Date    HGBA1C 7.1 (H) 12/22/2022     Most recent fingerstick glucose reviewed-   Recent Labs   Lab 01/14/23  1159 01/14/23  1644 01/14/23 2022 01/14/23  2328   POCTGLUCOSE 169* 262* 292* 228*     Current correctional scale  Low  Maintain anti-hyperglycemic dose as follows-   Antihyperglycemics (From admission, onward)    Start     Stop Route Frequency Ordered    01/09/23 2100  insulin detemir U-100 pen 10 Units         -- SubQ Nightly 01/09/23 1620    01/09/23 1730  insulin aspart U-100 pen 3 Units         -- SubQ 3 times daily with meals 01/09/23 1620    01/09/23 1719  insulin aspart U-100 pen 0-5 Units         -- SubQ Before meals & nightly PRN 01/09/23 1620        Hold Oral hypoglycemics while patient is in the hospital.  Podiatry consulted. Blood cultures are NG  Wound with Staph. ID consulted for recs for discharge    MRSA and Enteroccocus on wound cultures. Follow podiatry/ID recs.  -continue antibiotics and wound care. Follow cultures and path reports     Charcot's joint of left foot  This contributes to wound formation       Cellulitis of left foot  See DM foot wound      Iron deficiency anemia  Iron deficient by labs 12/2022. Not appropriate for IV iron in setting of active infection.   - started PO iron       Hyponatremia  Na slightly low on admit. May be attributed to antipsychotics. Not symptomatic.   - BMP in AM      Class 2 severe obesity with serious comorbidity and body mass index (BMI) of 39.0 to 39.9 in  adult  Body mass index is 39.71 kg/m². Morbid obesity complicates all aspects of disease management from diagnostic modalities to treatment. Weight loss encouraged and health benefits explained to patient.         Thrombocytosis  Plts elevated most likely due to iron deficiency. Treat iron deficiency.       Cellulitis of left lower extremity  See DM foot wound      Bipolar 1 disorder  No signs of acute bibi or depression. She is NOT taking carbamazepine.   - continue risperdal- takes 3mg BID  - continue depakote- takes 1500mg QHS      History of DVT (deep vein thrombosis)  History of DVT and PE. Most recent US shows no DVT.   - continue home eliquis       Tobacco abuse  Patient was counseled on smoking cessation for 4 minutes. Encouraged cessation.       Hyperlipidemia  Continue home pravastatin       COPD (chronic obstructive pulmonary disease)  No PFTs to review. No signs of acute exacerbation. Stable on room air.       Essential hypertension  BP is borderline. Hold home metoprolol and lisinopril for now.       Type II diabetes mellitus with neurological manifestations  With neuropathy affecting feet. On gabapentin but not filled since 11/2022  - continue gabapentin       VTE Risk Mitigation (From admission, onward)         Ordered     apixaban tablet 5 mg  2 times daily         01/09/23 1620     IP VTE HIGH RISK PATIENT  Once         01/09/23 1620     Reason for No Pharmacological VTE Prophylaxis  Once        Question:  Reasons:  Answer:  Already adequately anticoagulated on oral Anticoagulants    01/09/23 1620                      I have completed this tele-visit without the assistance of a telepresenter.    The attending portion of this evaluation, treatment, and documentation was performed per Mariam Montano MD via Telemedicine AudioVisual using the secure [employer] software platform with 2 way audio/video. The provider was located off-site and the patient is located in the hospital. The aforementioned video  software was utilized to document the relevant history and physical exam    Mariam Montano MD  Department of Hospital Medicine   [facility]

## 2023-01-15 NOTE — ASSESSMENT & PLAN NOTE
Admitted with worsening L plantar DM foot wound. S/p I&D in 12/2022 with bone cultures no growth and treated for skin/soft tissue infection with bactrim x10 days. Worsening with enlarging wound and cellulitis involving the foot and the leg.   - On vanc, ctx discotninued  - Podiatry consulted     Patient's FSGs are uncontrolled due to hyperglycemia on current medication regimen.  Last A1c reviewed-   Lab Results   Component Value Date    HGBA1C 7.1 (H) 12/22/2022     Most recent fingerstick glucose reviewed-   Recent Labs   Lab 01/14/23  1159 01/14/23  1644 01/14/23 2022 01/14/23  2328   POCTGLUCOSE 169* 262* 292* 228*     Current correctional scale  Low  Maintain anti-hyperglycemic dose as follows-   Antihyperglycemics (From admission, onward)    Start     Stop Route Frequency Ordered    01/09/23 2100  insulin detemir U-100 pen 10 Units         -- SubQ Nightly 01/09/23 1620    01/09/23 1730  insulin aspart U-100 pen 3 Units         -- SubQ 3 times daily with meals 01/09/23 1620    01/09/23 1719  insulin aspart U-100 pen 0-5 Units         -- SubQ Before meals & nightly PRN 01/09/23 1620        Hold Oral hypoglycemics while patient is in the hospital.  Podiatry consulted. Blood cultures are NG  Wound with Staph. ID consulted for recs for discharge    MRSA and Enteroccocus on wound cultures. Follow podiatry/ID recs.  -continue antibiotics and wound care. Follow cultures and path reports

## 2023-01-15 NOTE — PROGRESS NOTES
Pharmacokinetic Assessment Follow Up: IV Vancomycin    Vancomycin serum concentration assessment(s):    The trough level was drawn correctly and can be used to guide therapy at this time. The measurement is within the desired definitive target range of 10 to 20 mcg/mL.    Vancomycin Regimen Plan:    Continue regimen to Vancomycin 2000 mg IV every 12 hours with next serum trough concentration measured at 05:00 prior to 4tj dose on 1/16/23    Drug levels (last 3 results):  Recent Labs   Lab Result Units 01/12/23  0440 01/13/23  0436 01/14/23  1712   Vancomycin-Trough ug/mL 18.7 18.5 16.2       Pharmacy will continue to follow and monitor vancomycin.    Please contact pharmacy at extension [phone] for questions regarding this assessment.    Thank you for the consult,   Imani Arredondo       Patient brief summary:  Audrey Natarajan is a 52 y.o. female initiated on antimicrobial therapy with IV Vancomycin for treatment of skin & soft tissue infection      Drug Allergies:   Review of patient's allergies indicates:   Allergen Reactions    Morphine Other (See Comments)     Patient had a psychotic episode after taking Morphine  Agitation, hallucinations    Penicillins Anaphylaxis     Tolerated cephalosporins in the past    Januvia [sitagliptin] Hives    Carbamazepine Other (See Comments)     hyponatremia       Actual Body Weight:   107 kg    Renal Function:   Estimated Creatinine Clearance: 138.8 mL/min (based on SCr of 0.6 mg/dL).,     Dialysis Method (if applicable):  N/A    CBC (last 72 hours):  Recent Labs   Lab Result Units 01/12/23  0440   WBC K/uL 9.77   Hemoglobin g/dL 10.1*   Hematocrit % 31.8*   Platelets K/uL 744*   Gran % % 35.6*   Lymph % % 45.5   Mono % % 12.0   Eosinophil % % 4.8   Basophil % % 1.2   Differential Method  Automated       Metabolic Panel (last 72 hours):  Recent Labs   Lab Result Units 01/12/23  0440   Sodium mmol/L 133*   Potassium mmol/L 4.7   Chloride mmol/L 98   CO2 mmol/L 27   Glucose  mg/dL 148*   BUN mg/dL 11   Creatinine mg/dL 0.6       Vancomycin Administrations:  vancomycin given in the last 96 hours                     vancomycin (VANCOCIN) 2,000 mg in dextrose 5 % 500 mL IVPB (mg) 2,000 mg New Bag 01/14/23 1804     2,000 mg New Bag  0517     2,000 mg New Bag 01/13/23 1701     2,000 mg New Bag  0626     2,000 mg New Bag 01/12/23 1624     2,000 mg New Bag  0726     2,000 mg New Bag 01/11/23 1755     2,000 mg New Bag  0611                    Microbiologic Results:  Microbiology Results (last 7 days)       Procedure Component Value Units Date/Time    AFB Culture & Smear [[id number]] Collected: 01/13/23 1448    Order Status: Sent Specimen: Bone from Foot, Left Updated: 01/14/23 1700    Gram stain [[id number]] Collected: 01/13/23 1448    Order Status: Completed Specimen: Bone from Foot, Left Updated: 01/14/23 0848     Gram Stain Result Rare WBC's      No organisms seen    Aerobic culture [[id number]] Collected: 01/13/23 1448    Order Status: Completed Specimen: Bone from Foot, Left Updated: 01/14/23 0830     Aerobic Bacterial Culture No growth    Fungus culture [[id number]] Collected: 01/13/23 1448    Order Status: Sent Specimen: Bone from Foot, Left Updated: 01/13/23 1503    Blood culture #1 **CANNOT BE ORDERED STAT** [[id number]] Collected: 01/09/23 1205    Order Status: Completed Specimen: Blood from Peripheral, Antecubital, Left Updated: 01/13/23 1503     Blood Culture, Routine No Growth after 4 days.    Blood culture #2 **CANNOT BE ORDERED STAT** [[id number]] Collected: 01/09/23 1214    Order Status: Completed Specimen: Blood from Peripheral, Antecubital, Left Updated: 01/13/23 1503     Blood Culture, Routine No Growth after 4 days.    Culture, Anaerobe [[id number]] Collected: 01/13/23 1448    Order Status: Sent Specimen: Bone from Foot, Left Updated: 01/13/23 1501    Aerobic culture [[id number]]  (Abnormal)  (Susceptibility) Collected: 01/09/23 1647    Order Status: Completed Specimen: Wound from  Foot, Left Updated: 01/13/23 0721     Aerobic Bacterial Culture Results called to and read back by:Marcy dewitt 01/12/2023      08:01      METHICILLIN RESISTANT STAPHYLOCOCCUS AUREUS  Moderate      Gram stain [[id number]] Collected: 01/09/23 1647    Order Status: Completed Specimen: Wound from Foot, Left Updated: 01/10/23 0803     Gram Stain Result No WBC's      No organisms seen

## 2023-01-15 NOTE — SUBJECTIVE & OBJECTIVE
Interval History: Pt afebrile and HDS. Stable on room air. Continue wound care. Continue vanc. PICC line in place. Adjust insulin for hyperglycemia.     Review of Systems   Constitutional:  Positive for activity change and fatigue. Negative for fever.   Respiratory:  Negative for shortness of breath.    Cardiovascular:  Negative for chest pain.   Gastrointestinal:  Negative for abdominal pain.   Skin:  Positive for wound.   Neurological:  Negative for dizziness and headaches.   All other systems reviewed and are negative.  Objective:     Vital Signs (Most Recent):  Temp: 97.8 °F (36.6 °C) (01/14/23 2330)  Pulse: 99 (01/14/23 2330)  Resp: 18 (01/14/23 2330)  BP: 134/66 (01/14/23 2330)  SpO2: 96 % (01/14/23 2330) Vital Signs (24h Range):  Temp:  [97 °F (36.1 °C)-98.4 °F (36.9 °C)] 97.8 °F (36.6 °C)  Pulse:  [80-99] 99  Resp:  [16-19] 18  SpO2:  [92 %-97 %] 96 %  BP: (131-148)/(62-79) 134/66     Weight: 107 kg (235 lb 14.3 oz)  Body mass index is 38.07 kg/m².    Intake/Output Summary (Last 24 hours) at 1/15/2023 0110  Last data filed at 1/14/2023 1730  Gross per 24 hour   Intake 3839.41 ml   Output 1000 ml   Net 2839.41 ml      Physical Exam  Vitals and nursing note reviewed.   Constitutional:       Appearance: Normal appearance.   HENT:      Head: Normocephalic and atraumatic.   Eyes:      Extraocular Movements: Extraocular movements intact.      Pupils: Pupils are equal, round, and reactive to light.   Cardiovascular:      Rate and Rhythm: Normal rate and regular rhythm.   Pulmonary:      Effort: Pulmonary effort is normal. No respiratory distress.   Abdominal:      General: There is no distension.   Musculoskeletal:         General: Normal range of motion.      Cervical back: Normal range of motion.   Skin:     Comments: L foot wound   Neurological:      General: No focal deficit present.      Mental Status: She is alert and oriented to person, place, and time.   Psychiatric:         Mood and Affect: Mood normal.          Behavior: Behavior normal.       Significant Labs: All pertinent labs within the past 24 hours have been reviewed.  CBC: No results for input(s): WBC, HGB, HCT, PLT in the last 48 hours.  CMP: No results for input(s): NA, K, CL, CO2, GLU, BUN, CREATININE, CALCIUM, PROT, ALBUMIN, BILITOT, ALKPHOS, AST, ALT, ANIONGAP, EGFRNONAA in the last 48 hours.    Invalid input(s): [place]    Significant Imaging: I have reviewed all pertinent imaging results/findings within the past 24 hours.

## 2023-01-15 NOTE — PLAN OF CARE
Problem: Adult Inpatient Plan of Care  Goal: Plan of Care Review  Outcome: Ongoing, Progressing  Goal: Optimal Comfort and Wellbeing  Outcome: Ongoing, Progressing     Problem: Diabetes Comorbidity  Goal: Blood Glucose Level Within Targeted Range  Outcome: Ongoing, Progressing     Pt free from falls or nay further trauma through out the shift. Prescribed medication administered. Complaints of pain,prn medication given. Wound care done. Pt in no distress. Will continue to monitor.

## 2023-01-15 NOTE — PROGRESS NOTES
[facility] Medicine  Telemedicine Progress Note    Patient Name: Audrey Natarajan  MRN: [id number]  Patient Class: IP- Inpatient   Admission Date: 1/9/2023  Length of Stay: 6 days  Attending Physician: Mariam Montano MD  Primary Care Provider: Donaldo Pena MD          Subjective:     Principal Problem:Diabetic ulcer of left midfoot associated with type 2 diabetes mellitus, limited to breakdown of skin        HPI:  Ms Audrey Natarajan is a 52 y.o. woman with DM who presents with worsening L leg swelling.     She was recently admitted 12/22-27/2022 with worsening L plantar foot ulceration. S/p debridement by Podiatry. Bone cultures no growth. Wound culture with MRSA. Seen by ID who recommended bactrim.    She took the bactrim as prescribed with last dose taken 1/5/2023. She noticed improvement in swelling and erythema on bactrim but then noticed worsening swelling and erythema to the L foot and leg. She followed up in wound care on 12/29 and then again on 1/6. Per notes on 1/6, wound had doubled in size.     She has noticed decreased appetite, increased thirst, increased urination, and high glucoses. She does smoke cigarettes.           Overview/Hospital Course:  Patient admitted for L diabetic foot ulcer. Podiatry consulted as well as ID. Patient grew out MRSA and Enterococcus on wound cultures       Interval History: Pt afebrile and HDS. Stable on room air. Continue wound care. Continue vanc. PICC line in place. Adjust insulin for hyperglycemia.     Review of Systems   Constitutional:  Positive for activity change and fatigue. Negative for fever.   Respiratory:  Negative for shortness of breath.    Cardiovascular:  Negative for chest pain.   Gastrointestinal:  Negative for abdominal pain.   Skin:  Positive for wound.   Neurological:  Negative for dizziness and headaches.   All other systems reviewed and are negative.  Objective:     Vital Signs (Most Recent):  Temp: 97.8 °F (36.6  °C) (01/14/23 2330)  Pulse: 99 (01/14/23 2330)  Resp: 18 (01/14/23 2330)  BP: 134/66 (01/14/23 2330)  SpO2: 96 % (01/14/23 2330) Vital Signs (24h Range):  Temp:  [97 °F (36.1 °C)-98.4 °F (36.9 °C)] 97.8 °F (36.6 °C)  Pulse:  [80-99] 99  Resp:  [16-19] 18  SpO2:  [92 %-97 %] 96 %  BP: (131-148)/(62-79) 134/66     Weight: 107 kg (235 lb 14.3 oz)  Body mass index is 38.07 kg/m².    Intake/Output Summary (Last 24 hours) at 1/15/2023 0110  Last data filed at 1/14/2023 1730  Gross per 24 hour   Intake 3839.41 ml   Output 1000 ml   Net 2839.41 ml      Physical Exam  Vitals and nursing note reviewed.   Constitutional:       Appearance: Normal appearance.   HENT:      Head: Normocephalic and atraumatic.   Eyes:      Extraocular Movements: Extraocular movements intact.      Pupils: Pupils are equal, round, and reactive to light.   Cardiovascular:      Rate and Rhythm: Normal rate and regular rhythm.   Pulmonary:      Effort: Pulmonary effort is normal. No respiratory distress.   Abdominal:      General: There is no distension.   Musculoskeletal:         General: Normal range of motion.      Cervical back: Normal range of motion.   Skin:     Comments: L foot wound   Neurological:      General: No focal deficit present.      Mental Status: She is alert and oriented to person, place, and time.   Psychiatric:         Mood and Affect: Mood normal.         Behavior: Behavior normal.       Significant Labs: All pertinent labs within the past 24 hours have been reviewed.  CBC: No results for input(s): WBC, HGB, HCT, PLT in the last 48 hours.  CMP: No results for input(s): NA, K, CL, CO2, GLU, BUN, CREATININE, CALCIUM, PROT, ALBUMIN, BILITOT, ALKPHOS, AST, ALT, ANIONGAP, EGFRNONAA in the last 48 hours.    Invalid input(s): ESTGFAFRICA    Significant Imaging: I have reviewed all pertinent imaging results/findings within the past 24 hours.      Assessment/Plan:      * Diabetic ulcer of left midfoot associated with type 2 diabetes  mellitus, limited to breakdown of skin  Admitted with worsening L plantar DM foot wound. S/p I&D in 12/2022 with bone cultures no growth and treated for skin/soft tissue infection with bactrim x10 days. Worsening with enlarging wound and cellulitis involving the foot and the leg.   - On vanc, ctx discotninued  - Podiatry consulted     Patient's FSGs are uncontrolled due to hyperglycemia on current medication regimen.  Last A1c reviewed-   Lab Results   Component Value Date    HGBA1C 7.1 (H) 12/22/2022     Most recent fingerstick glucose reviewed-   Recent Labs   Lab 01/14/23  1159 01/14/23  1644 01/14/23 2022 01/14/23  2328   POCTGLUCOSE 169* 262* 292* 228*     Current correctional scale  Low  Maintain anti-hyperglycemic dose as follows-   Antihyperglycemics (From admission, onward)    Start     Stop Route Frequency Ordered    01/09/23 2100  insulin detemir U-100 pen 10 Units         -- SubQ Nightly 01/09/23 1620    01/09/23 1730  insulin aspart U-100 pen 3 Units         -- SubQ 3 times daily with meals 01/09/23 1620    01/09/23 1719  insulin aspart U-100 pen 0-5 Units         -- SubQ Before meals & nightly PRN 01/09/23 1620        Hold Oral hypoglycemics while patient is in the hospital.  Podiatry consulted. Blood cultures are NG  Wound with Staph. ID consulted for recs for discharge    MRSA and Enteroccocus on wound cultures. Follow podiatry/ID recs.  -continue antibiotics and wound care. Follow cultures and path reports     Charcot's joint of left foot  This contributes to wound formation       Cellulitis of left foot  See DM foot wound      Iron deficiency anemia  Iron deficient by labs 12/2022. Not appropriate for IV iron in setting of active infection.   - started PO iron       Hyponatremia  Na slightly low on admit. May be attributed to antipsychotics. Not symptomatic.   - BMP in AM      Class 2 severe obesity with serious comorbidity and body mass index (BMI) of 39.0 to 39.9 in adult  Body mass index is  39.71 kg/m². Morbid obesity complicates all aspects of disease management from diagnostic modalities to treatment. Weight loss encouraged and health benefits explained to patient.         Thrombocytosis  Plts elevated most likely due to iron deficiency. Treat iron deficiency.       Cellulitis of left lower extremity  See DM foot wound      Bipolar 1 disorder  No signs of acute bibi or depression. She is NOT taking carbamazepine.   - continue risperdal- takes 3mg BID  - continue depakote- takes 1500mg QHS      History of DVT (deep vein thrombosis)  History of DVT and PE. Most recent US shows no DVT.   - continue home eliquis       Tobacco abuse  Patient was counseled on smoking cessation for 4 minutes. Encouraged cessation.       Hyperlipidemia  Continue home pravastatin       COPD (chronic obstructive pulmonary disease)  No PFTs to review. No signs of acute exacerbation. Stable on room air.       Essential hypertension  BP is borderline. Hold home metoprolol and lisinopril for now.       Type II diabetes mellitus with neurological manifestations  With neuropathy affecting feet. On gabapentin but not filled since 11/2022  - continue gabapentin       VTE Risk Mitigation (From admission, onward)         Ordered     apixaban tablet 5 mg  2 times daily         01/09/23 1620     IP VTE HIGH RISK PATIENT  Once         01/09/23 1620     Reason for No Pharmacological VTE Prophylaxis  Once        Question:  Reasons:  Answer:  Already adequately anticoagulated on oral Anticoagulants    01/09/23 1620                      I have completed this tele-visit without the assistance of a telepresenter.    The attending portion of this evaluation, treatment, and documentation was performed per Mariam Montano MD via Telemedicine AudioVisual using the secure [employer] software platform with 2 way audio/video. The provider was located off-site and the patient is located in the hospital. The aforementioned video software was utilized to  document the relevant history and physical exam    Mariam Montano MD  Department of Hospital Medicine   [facility]

## 2023-01-16 LAB
ANION GAP SERPL CALC-SCNC: 7 MMOL/L (ref 8–16)
BASOPHILS # BLD AUTO: 0.1 K/UL (ref 0–0.2)
BASOPHILS NFR BLD: 0.8 % (ref 0–1.9)
BUN SERPL-MCNC: 14 MG/DL (ref 6–20)
CALCIUM SERPL-MCNC: 8.9 MG/DL (ref 8.7–10.5)
CHLORIDE SERPL-SCNC: 96 MMOL/L (ref 95–110)
CO2 SERPL-SCNC: 31 MMOL/L (ref 23–29)
CREAT SERPL-MCNC: 0.6 MG/DL (ref 0.5–1.4)
DIFFERENTIAL METHOD: ABNORMAL
EOSINOPHIL # BLD AUTO: 0.7 K/UL (ref 0–0.5)
EOSINOPHIL NFR BLD: 5.7 % (ref 0–8)
ERYTHROCYTE [DISTWIDTH] IN BLOOD BY AUTOMATED COUNT: 16.7 % (ref 11.5–14.5)
EST. GFR  (NO RACE VARIABLE): >60 ML/MIN/1.73 M^2
GLUCOSE SERPL-MCNC: 221 MG/DL (ref 70–110)
HCT VFR BLD AUTO: 31.6 % (ref 37–48.5)
HGB BLD-MCNC: 9.9 G/DL (ref 12–16)
IMM GRANULOCYTES # BLD AUTO: 0.11 K/UL (ref 0–0.04)
IMM GRANULOCYTES NFR BLD AUTO: 0.9 % (ref 0–0.5)
LYMPHOCYTES # BLD AUTO: 4.3 K/UL (ref 1–4.8)
LYMPHOCYTES NFR BLD: 33.9 % (ref 18–48)
MAGNESIUM SERPL-MCNC: 1.8 MG/DL (ref 1.6–2.6)
MCH RBC QN AUTO: 24.6 PG (ref 27–31)
MCHC RBC AUTO-ENTMCNC: 31.3 G/DL (ref 32–36)
MCV RBC AUTO: 79 FL (ref 82–98)
MONOCYTES # BLD AUTO: 1.8 K/UL (ref 0.3–1)
MONOCYTES NFR BLD: 14.2 % (ref 4–15)
NEUTROPHILS # BLD AUTO: 5.6 K/UL (ref 1.8–7.7)
NEUTROPHILS NFR BLD: 44.5 % (ref 38–73)
NRBC BLD-RTO: 0 /100 WBC
PHOSPHATE SERPL-MCNC: 3.2 MG/DL (ref 2.7–4.5)
PLATELET # BLD AUTO: 559 K/UL (ref 150–450)
PLATELET BLD QL SMEAR: ABNORMAL
PMV BLD AUTO: 9.7 FL (ref 9.2–12.9)
POCT GLUCOSE: 227 MG/DL (ref 70–110)
POCT GLUCOSE: 328 MG/DL (ref 70–110)
POCT GLUCOSE: 346 MG/DL (ref 70–110)
POCT GLUCOSE: 376 MG/DL (ref 70–110)
POTASSIUM SERPL-SCNC: 4.8 MMOL/L (ref 3.5–5.1)
RBC # BLD AUTO: 4.02 M/UL (ref 4–5.4)
SODIUM SERPL-SCNC: 134 MMOL/L (ref 136–145)
VANCOMYCIN TROUGH SERPL-MCNC: 22 UG/ML (ref 10–22)
WBC # BLD AUTO: 12.54 K/UL (ref 3.9–12.7)

## 2023-01-16 PROCEDURE — 25000003 PHARM REV CODE 250: Performed by: HOSPITALIST

## 2023-01-16 PROCEDURE — A4216 STERILE WATER/SALINE, 10 ML: HCPCS | Performed by: INTERNAL MEDICINE

## 2023-01-16 PROCEDURE — 11000001 HC ACUTE MED/SURG PRIVATE ROOM

## 2023-01-16 PROCEDURE — 27000207 HC ISOLATION

## 2023-01-16 PROCEDURE — 36415 COLL VENOUS BLD VENIPUNCTURE: CPT | Performed by: HOSPITALIST

## 2023-01-16 PROCEDURE — 80202 ASSAY OF VANCOMYCIN: CPT | Performed by: HOSPITALIST

## 2023-01-16 PROCEDURE — 25000003 PHARM REV CODE 250: Performed by: INTERNAL MEDICINE

## 2023-01-16 PROCEDURE — 85025 COMPLETE CBC W/AUTO DIFF WBC: CPT | Performed by: HOSPITALIST

## 2023-01-16 PROCEDURE — S4991 NICOTINE PATCH NONLEGEND: HCPCS | Performed by: INTERNAL MEDICINE

## 2023-01-16 PROCEDURE — 80202 ASSAY OF VANCOMYCIN: CPT | Mod: 91 | Performed by: HOSPITALIST

## 2023-01-16 PROCEDURE — 63600175 PHARM REV CODE 636 W HCPCS: Performed by: HOSPITALIST

## 2023-01-16 PROCEDURE — 84100 ASSAY OF PHOSPHORUS: CPT | Performed by: HOSPITALIST

## 2023-01-16 PROCEDURE — 80048 BASIC METABOLIC PNL TOTAL CA: CPT | Performed by: HOSPITALIST

## 2023-01-16 PROCEDURE — 83735 ASSAY OF MAGNESIUM: CPT | Performed by: HOSPITALIST

## 2023-01-16 RX ADMIN — SENNOSIDES AND DOCUSATE SODIUM 1 TABLET: 50; 8.6 TABLET ORAL at 08:01

## 2023-01-16 RX ADMIN — LIDOCAINE 1 PATCH: 50 PATCH CUTANEOUS at 09:01

## 2023-01-16 RX ADMIN — OXYCODONE AND ACETAMINOPHEN 1 TABLET: 10; 325 TABLET ORAL at 06:01

## 2023-01-16 RX ADMIN — RISPERIDONE 3 MG: 1 TABLET ORAL at 08:01

## 2023-01-16 RX ADMIN — INSULIN ASPART 5 UNITS: 100 INJECTION, SOLUTION INTRAVENOUS; SUBCUTANEOUS at 11:01

## 2023-01-16 RX ADMIN — Medication 10 ML: at 12:01

## 2023-01-16 RX ADMIN — BUMETANIDE 1 MG: 1 TABLET ORAL at 08:01

## 2023-01-16 RX ADMIN — APIXABAN 5 MG: 5 TABLET, FILM COATED ORAL at 10:01

## 2023-01-16 RX ADMIN — OXYCODONE AND ACETAMINOPHEN 1 TABLET: 10; 325 TABLET ORAL at 07:01

## 2023-01-16 RX ADMIN — DIVALPROEX SODIUM 1500 MG: 250 TABLET, DELAYED RELEASE ORAL at 10:01

## 2023-01-16 RX ADMIN — Medication 1 PATCH: at 08:01

## 2023-01-16 RX ADMIN — DIPHENHYDRAMINE HYDROCHLORIDE 25 MG: 25 CAPSULE ORAL at 08:01

## 2023-01-16 RX ADMIN — MAGNESIUM HYDROXIDE 2400 MG: 400 SUSPENSION ORAL at 08:01

## 2023-01-16 RX ADMIN — RISPERIDONE 3 MG: 1 TABLET ORAL at 10:01

## 2023-01-16 RX ADMIN — MICONAZOLE NITRATE: 20 CREAM TOPICAL at 09:01

## 2023-01-16 RX ADMIN — VANCOMYCIN HYDROCHLORIDE 2000 MG: 1 INJECTION, POWDER, LYOPHILIZED, FOR SOLUTION INTRAVENOUS at 06:01

## 2023-01-16 RX ADMIN — GABAPENTIN 600 MG: 300 CAPSULE ORAL at 10:01

## 2023-01-16 RX ADMIN — INSULIN ASPART 5 UNITS: 100 INJECTION, SOLUTION INTRAVENOUS; SUBCUTANEOUS at 07:01

## 2023-01-16 RX ADMIN — GUAIFENESIN 200 MG: 200 SOLUTION ORAL at 10:01

## 2023-01-16 RX ADMIN — Medication 10 ML: at 04:01

## 2023-01-16 RX ADMIN — INSULIN ASPART 2 UNITS: 100 INJECTION, SOLUTION INTRAVENOUS; SUBCUTANEOUS at 07:01

## 2023-01-16 RX ADMIN — GUAIFENESIN 200 MG: 200 SOLUTION ORAL at 06:01

## 2023-01-16 RX ADMIN — MICONAZOLE NITRATE: 20 CREAM TOPICAL at 10:01

## 2023-01-16 RX ADMIN — PRAVASTATIN SODIUM 40 MG: 40 TABLET ORAL at 10:01

## 2023-01-16 RX ADMIN — INSULIN ASPART 5 UNITS: 100 INJECTION, SOLUTION INTRAVENOUS; SUBCUTANEOUS at 04:01

## 2023-01-16 RX ADMIN — FERROUS SULFATE TAB 325 MG (65 MG ELEMENTAL FE) 1 EACH: 325 (65 FE) TAB at 08:01

## 2023-01-16 RX ADMIN — CETIRIZINE HYDROCHLORIDE 5 MG: 5 TABLET ORAL at 08:01

## 2023-01-16 RX ADMIN — Medication 10 ML: at 11:01

## 2023-01-16 RX ADMIN — APIXABAN 5 MG: 5 TABLET, FILM COATED ORAL at 08:01

## 2023-01-16 RX ADMIN — GABAPENTIN 600 MG: 300 CAPSULE ORAL at 08:01

## 2023-01-16 RX ADMIN — INSULIN ASPART 2 UNITS: 100 INJECTION, SOLUTION INTRAVENOUS; SUBCUTANEOUS at 10:01

## 2023-01-16 RX ADMIN — DIPHENHYDRAMINE HYDROCHLORIDE 25 MG: 25 CAPSULE ORAL at 10:01

## 2023-01-16 RX ADMIN — INSULIN ASPART 4 UNITS: 100 INJECTION, SOLUTION INTRAVENOUS; SUBCUTANEOUS at 04:01

## 2023-01-16 RX ADMIN — POLYETHYLENE GLYCOL 3350 17 G: 17 POWDER, FOR SOLUTION ORAL at 08:01

## 2023-01-16 RX ADMIN — SENNOSIDES AND DOCUSATE SODIUM 1 TABLET: 50; 8.6 TABLET ORAL at 10:01

## 2023-01-16 RX ADMIN — Medication 10 ML: at 06:01

## 2023-01-16 NOTE — PROGRESS NOTES
Pharmacokinetic Assessment Follow Up: IV Vancomycin    Vancomycin serum concentration assessment(s):    The trough level was drawn correctly and can be used to guide therapy at this time. The measurement is above the desired definitive target range of 15 to 20 mcg/mL.    Vancomycin Regimen Plan: Repeat random trough level ordered today at 1830 today;  will adjust dose/freq based on the result.     Continue regimen to Vancomycin 2000 mg IV every 12 hours with next serum trough concentration measured at 1830 prior to next dose on 1-16-23    Drug levels (last 3 results):  Recent Labs   Lab Result Units 01/14/23  1712 01/16/23  0514   Vancomycin-Trough ug/mL 16.2 22.0       Pharmacy will continue to follow and monitor vancomycin.    Please contact pharmacy at extension [phone] for questions regarding this assessment.    Thank you for the consult,   Michelle Razo       Patient brief summary:  Audrey Natarajan is a 52 y.o. female initiated on antimicrobial therapy with IV Vancomycin for treatment of skin & soft tissue infection    The patient's current regimen is Vancomycin 200mg ivpb q12h    Drug Allergies:   Review of patient's allergies indicates:   Allergen Reactions    Morphine Other (See Comments)     Patient had a psychotic episode after taking Morphine  Agitation, hallucinations    Penicillins Anaphylaxis     Tolerated cephalosporins in the past    Januvia [sitagliptin] Hives    Carbamazepine Other (See Comments)     hyponatremia       Actual Body Weight:   107 kg    Renal Function:   Estimated Creatinine Clearance: 138.8 mL/min (based on SCr of 0.6 mg/dL).,     Dialysis Method (if applicable):  N/A    CBC (last 72 hours):  Recent Labs   Lab Result Units 01/16/23  0514   WBC K/uL 12.54   Hemoglobin g/dL 9.9*   Hematocrit % 31.6*   Platelets K/uL 559*   Gran % % 44.5   Lymph % % 33.9   Mono % % 14.2   Eosinophil % % 5.7   Basophil % % 0.8   Differential Method  Automated       Metabolic Panel (last 72  hours):  Recent Labs   Lab Result Units 01/16/23  0514   Sodium mmol/L 134*   Potassium mmol/L 4.8   Chloride mmol/L 96   CO2 mmol/L 31*   Glucose mg/dL 221*   BUN mg/dL 14   Creatinine mg/dL 0.6   Magnesium mg/dL 1.8   Phosphorus mg/dL 3.2       Vancomycin Administrations:  vancomycin given in the last 96 hours                     vancomycin (VANCOCIN) 2,000 mg in dextrose 5 % 500 mL IVPB (mg) 2,000 mg New Bag 01/16/23 0615     2,000 mg New Bag 01/15/23 1751     2,000 mg New Bag  0602     2,000 mg New Bag 01/14/23 1804     2,000 mg New Bag  0517     2,000 mg New Bag 01/13/23 1701     2,000 mg New Bag  0626     2,000 mg New Bag 01/12/23 1624                    Microbiologic Results:  Microbiology Results (last 7 days)       Procedure Component Value Units Date/Time    Aerobic culture [[id number]] Collected: 01/13/23 1448    Order Status: Completed Specimen: Bone from Foot, Left Updated: 01/16/23 0801     Aerobic Bacterial Culture No growth    AFB Culture & Smear [[id number]] Collected: 01/13/23 1448    Order Status: Completed Specimen: Bone from Foot, Left Updated: 01/15/23 2127     AFB Culture & Smear Culture in progress    Culture, Anaerobe [[id number]] Collected: 01/13/23 1448    Order Status: Completed Specimen: Bone from Foot, Left Updated: 01/15/23 0600     Anaerobic Culture Culture in progress    Gram stain [[id number]] Collected: 01/13/23 1448    Order Status: Completed Specimen: Bone from Foot, Left Updated: 01/14/23 0848     Gram Stain Result Rare WBC's      No organisms seen    Fungus culture [[id number]] Collected: 01/13/23 1448    Order Status: Sent Specimen: Bone from Foot, Left Updated: 01/13/23 1503    Blood culture #1 **CANNOT BE ORDERED STAT** [[id number]] Collected: 01/09/23 1205    Order Status: Completed Specimen: Blood from Peripheral, Antecubital, Left Updated: 01/13/23 1503     Blood Culture, Routine No Growth after 4 days.    Blood culture #2 **CANNOT BE ORDERED STAT** [[id number]] Collected:  01/09/23 1214    Order Status: Completed Specimen: Blood from Peripheral, Antecubital, Left Updated: 01/13/23 1503     Blood Culture, Routine No Growth after 4 days.    Aerobic culture [[id number]]  (Abnormal)  (Susceptibility) Collected: 01/09/23 1647    Order Status: Completed Specimen: Wound from Foot, Left Updated: 01/13/23 0721     Aerobic Bacterial Culture Results called to and read back by:Marcy dewitt 01/12/2023      08:01      METHICILLIN RESISTANT STAPHYLOCOCCUS AUREUS  Moderate      Gram stain [[id number]] Collected: 01/09/23 1647    Order Status: Completed Specimen: Wound from Foot, Left Updated: 01/10/23 0803     Gram Stain Result No WBC's      No organisms seen

## 2023-01-16 NOTE — CLINICAL REVIEW
IP Sepsis Screen (most recent)       Sepsis Screen (IP) - 01/16/23 0820       Is the patient's history or complaint suggestive of a possible infection? Yes  -CB    Are there at least two of the following signs and symptoms present? Yes  -CB    Sepsis signs/symptoms - Tachycardia Tachycardia     >90  -CB    Sepsis signs/symptoms - WBC WBC < 4,000 or WBC > 12,000  -CB    Are any of the following organ dysfunction criteria present and not considered to be due to a chronic condition? Yes  -CB    Organ Dysfunction Criteria - O2 O2 Saturation < 95% on room air  -CB    Initiate Sepsis Protocol No  -CB    Reason sepsis not considered Pt. receiving appropriate management  -CB              User Key  (r) = Recorded By, (t) = Taken By, (c) = Cosigned By      Initials Name    CB Cora Wilkins RN

## 2023-01-16 NOTE — PLAN OF CARE
Placed call to [facility] [phone], left detailed voice message. Awaiting ins chris NICHOLAS to continue to follow up    01/16/23 0858   Discharge Reassessment   Assessment Type Discharge Planning Reassessment   Did the patient's condition or plan change since previous assessment? No   Discharge Plan A Long-term acute care facility (LTAC)   Discharge Plan B Home Health   DME Needed Upon Discharge  none   Discharge Barriers Identified None   Why the patient remains in the hospital Requires continued medical care;Placement issues   Post-Acute Status   Post-Acute Placement Status Pending payor medical review/second level review   Coverage Medicaid Healty blue   Discharge Delays (!) Post-Acute Set-up

## 2023-01-17 VITALS
DIASTOLIC BLOOD PRESSURE: 75 MMHG | HEART RATE: 101 BPM | OXYGEN SATURATION: 97 % | HEIGHT: 66 IN | SYSTOLIC BLOOD PRESSURE: 168 MMHG | TEMPERATURE: 99 F | BODY MASS INDEX: 37.91 KG/M2 | RESPIRATION RATE: 19 BRPM | WEIGHT: 235.88 LBS

## 2023-01-17 LAB
BACTERIA SPEC AEROBE CULT: NO GROWTH
BACTERIA SPEC ANAEROBE CULT: NORMAL
POCT GLUCOSE: 264 MG/DL (ref 70–110)
POCT GLUCOSE: 275 MG/DL (ref 70–110)
POCT GLUCOSE: 350 MG/DL (ref 70–110)
VANCOMYCIN SERPL-MCNC: 11.4 UG/ML
VANCOMYCIN TROUGH SERPL-MCNC: 15.6 UG/ML (ref 10–22)

## 2023-01-17 PROCEDURE — 80202 ASSAY OF VANCOMYCIN: CPT | Performed by: HOSPITALIST

## 2023-01-17 PROCEDURE — 36415 COLL VENOUS BLD VENIPUNCTURE: CPT | Performed by: HOSPITALIST

## 2023-01-17 PROCEDURE — 25000003 PHARM REV CODE 250: Performed by: HOSPITALIST

## 2023-01-17 PROCEDURE — S4991 NICOTINE PATCH NONLEGEND: HCPCS | Performed by: INTERNAL MEDICINE

## 2023-01-17 PROCEDURE — 25000003 PHARM REV CODE 250: Performed by: INTERNAL MEDICINE

## 2023-01-17 PROCEDURE — A4216 STERILE WATER/SALINE, 10 ML: HCPCS | Performed by: INTERNAL MEDICINE

## 2023-01-17 PROCEDURE — 63600175 PHARM REV CODE 636 W HCPCS: Performed by: HOSPITALIST

## 2023-01-17 RX ORDER — LIDOCAINE 50 MG/G
1 PATCH TOPICAL DAILY
Refills: 0
Start: 2023-01-17 | End: 2023-08-30 | Stop reason: SDUPTHER

## 2023-01-17 RX ORDER — OXYCODONE AND ACETAMINOPHEN 10; 325 MG/1; MG/1
1 TABLET ORAL EVERY 6 HOURS PRN
Qty: 10 TABLET | Refills: 0
Start: 2023-01-17 | End: 2023-02-06 | Stop reason: SDUPTHER

## 2023-01-17 RX ORDER — INSULIN ASPART 100 [IU]/ML
8 INJECTION, SOLUTION INTRAVENOUS; SUBCUTANEOUS 3 TIMES DAILY
Refills: 0
Start: 2023-01-17 | End: 2023-02-06

## 2023-01-17 RX ORDER — POLYETHYLENE GLYCOL 3350 17 G/17G
17 POWDER, FOR SOLUTION ORAL DAILY
Refills: 0
Start: 2023-01-18 | End: 2023-02-06 | Stop reason: SDUPTHER

## 2023-01-17 RX ORDER — INSULIN ASPART 100 [IU]/ML
8 INJECTION, SOLUTION INTRAVENOUS; SUBCUTANEOUS
Status: DISCONTINUED | OUTPATIENT
Start: 2023-01-17 | End: 2023-01-17 | Stop reason: HOSPADM

## 2023-01-17 RX ORDER — PANTOPRAZOLE SODIUM 40 MG/1
40 TABLET, DELAYED RELEASE ORAL DAILY
Status: DISCONTINUED | OUTPATIENT
Start: 2023-01-17 | End: 2023-01-17 | Stop reason: HOSPADM

## 2023-01-17 RX ORDER — FERROUS SULFATE 325(65) MG
325 TABLET, DELAYED RELEASE (ENTERIC COATED) ORAL DAILY
Start: 2023-01-17 | End: 2023-02-06 | Stop reason: SDUPTHER

## 2023-01-17 RX ORDER — HYDROXYZINE HYDROCHLORIDE 50 MG/1
25 TABLET, FILM COATED ORAL 4 TIMES DAILY PRN
Start: 2023-01-17 | End: 2023-10-06 | Stop reason: CLARIF

## 2023-01-17 RX ORDER — HYDROXYZINE PAMOATE 25 MG/1
25 CAPSULE ORAL EVERY 6 HOURS PRN
Status: DISCONTINUED | OUTPATIENT
Start: 2023-01-17 | End: 2023-01-17 | Stop reason: HOSPADM

## 2023-01-17 RX ORDER — ADHESIVE BANDAGE
30 BANDAGE TOPICAL DAILY PRN
Status: ON HOLD
Start: 2023-01-17 | End: 2023-03-05 | Stop reason: HOSPADM

## 2023-01-17 RX ORDER — AMOXICILLIN 250 MG
1 CAPSULE ORAL 2 TIMES DAILY
Status: ON HOLD
Start: 2023-01-17 | End: 2023-03-05 | Stop reason: HOSPADM

## 2023-01-17 RX ADMIN — INSULIN ASPART 8 UNITS: 100 INJECTION, SOLUTION INTRAVENOUS; SUBCUTANEOUS at 04:01

## 2023-01-17 RX ADMIN — MICONAZOLE NITRATE: 20 CREAM TOPICAL at 09:01

## 2023-01-17 RX ADMIN — Medication 10 ML: at 10:01

## 2023-01-17 RX ADMIN — INSULIN ASPART 8 UNITS: 100 INJECTION, SOLUTION INTRAVENOUS; SUBCUTANEOUS at 11:01

## 2023-01-17 RX ADMIN — GUAIFENESIN 200 MG: 200 SOLUTION ORAL at 04:01

## 2023-01-17 RX ADMIN — FERROUS SULFATE TAB 325 MG (65 MG ELEMENTAL FE) 1 EACH: 325 (65 FE) TAB at 08:01

## 2023-01-17 RX ADMIN — Medication 10 ML: at 06:01

## 2023-01-17 RX ADMIN — Medication 10 ML: at 12:01

## 2023-01-17 RX ADMIN — HYDROXYZINE PAMOATE 25 MG: 25 CAPSULE ORAL at 01:01

## 2023-01-17 RX ADMIN — INSULIN ASPART 3 UNITS: 100 INJECTION, SOLUTION INTRAVENOUS; SUBCUTANEOUS at 11:01

## 2023-01-17 RX ADMIN — APIXABAN 5 MG: 5 TABLET, FILM COATED ORAL at 08:01

## 2023-01-17 RX ADMIN — GABAPENTIN 600 MG: 300 CAPSULE ORAL at 08:01

## 2023-01-17 RX ADMIN — POLYETHYLENE GLYCOL 3350 17 G: 17 POWDER, FOR SOLUTION ORAL at 08:01

## 2023-01-17 RX ADMIN — VANCOMYCIN HYDROCHLORIDE 1500 MG: 1.5 INJECTION, POWDER, LYOPHILIZED, FOR SOLUTION INTRAVENOUS at 04:01

## 2023-01-17 RX ADMIN — INSULIN ASPART 4 UNITS: 100 INJECTION, SOLUTION INTRAVENOUS; SUBCUTANEOUS at 04:01

## 2023-01-17 RX ADMIN — Medication 1 PATCH: at 08:01

## 2023-01-17 RX ADMIN — INSULIN ASPART 5 UNITS: 100 INJECTION, SOLUTION INTRAVENOUS; SUBCUTANEOUS at 08:01

## 2023-01-17 RX ADMIN — THERA TABS 1 TABLET: TAB at 01:01

## 2023-01-17 RX ADMIN — INSULIN ASPART 3 UNITS: 100 INJECTION, SOLUTION INTRAVENOUS; SUBCUTANEOUS at 08:01

## 2023-01-17 RX ADMIN — LIDOCAINE 1 PATCH: 50 PATCH CUTANEOUS at 09:01

## 2023-01-17 RX ADMIN — VANCOMYCIN HYDROCHLORIDE 1500 MG: 1.5 INJECTION, POWDER, LYOPHILIZED, FOR SOLUTION INTRAVENOUS at 10:01

## 2023-01-17 RX ADMIN — RISPERIDONE 3 MG: 1 TABLET ORAL at 08:01

## 2023-01-17 RX ADMIN — MAGNESIUM HYDROXIDE 2400 MG: 400 SUSPENSION ORAL at 08:01

## 2023-01-17 RX ADMIN — CETIRIZINE HYDROCHLORIDE 5 MG: 5 TABLET ORAL at 08:01

## 2023-01-17 RX ADMIN — Medication 10 ML: at 04:01

## 2023-01-17 RX ADMIN — SENNOSIDES AND DOCUSATE SODIUM 1 TABLET: 50; 8.6 TABLET ORAL at 08:01

## 2023-01-17 RX ADMIN — BUMETANIDE 1 MG: 1 TABLET ORAL at 08:01

## 2023-01-17 RX ADMIN — PANTOPRAZOLE SODIUM 40 MG: 40 TABLET, DELAYED RELEASE ORAL at 01:01

## 2023-01-17 NOTE — PLAN OF CARE
Rachel with [employer] ([place]) notified SW that auth has been received. MELVI notified Dr. Hammond.     SW notified Dr. Montano.

## 2023-01-17 NOTE — PROGRESS NOTES
[facility] Medicine  Telemedicine Progress Note    Patient Name: Audrey Natarajan  MRN: [id number]  Patient Class: IP- Inpatient   Admission Date: 1/9/2023  Length of Stay: 8 days  Attending Physician: Mariam Montano MD  Primary Care Provider: Donaldo Pena MD          Subjective:     Principal Problem:Diabetic ulcer of left midfoot associated with type 2 diabetes mellitus, limited to breakdown of skin        HPI:  Ms Audrey Natarajan is a 52 y.o. woman with DM who presents with worsening L leg swelling.     She was recently admitted 12/22-27/2022 with worsening L plantar foot ulceration. S/p debridement by Podiatry. Bone cultures no growth. Wound culture with MRSA. Seen by ID who recommended bactrim.    She took the bactrim as prescribed with last dose taken 1/5/2023. She noticed improvement in swelling and erythema on bactrim but then noticed worsening swelling and erythema to the L foot and leg. She followed up in wound care on 12/29 and then again on 1/6. Per notes on 1/6, wound had doubled in size.     She has noticed decreased appetite, increased thirst, increased urination, and high glucoses. She does smoke cigarettes.           Overview/Hospital Course:  Patient admitted for L diabetic foot ulcer. Podiatry consulted as well as ID. Patient grew out MRSA and Enterococcus on wound cultures       Interval History: Pt afebrile and HDS. Stable on room air. Continue wound care. Continue vanc. PICC line in place. Pending placement to LTAC    Review of Systems   Constitutional:  Positive for activity change and fatigue. Negative for fever.   Respiratory:  Negative for shortness of breath.    Cardiovascular:  Negative for chest pain.   Gastrointestinal:  Negative for abdominal pain.   Skin:  Positive for wound.   Neurological:  Negative for dizziness and headaches.   All other systems reviewed and are negative.  Objective:     Vital Signs (Most Recent):  Temp: 98.3 °F (36.8 °C)  (01/16/23 1954)  Pulse: 86 (01/16/23 1954)  Resp: 20 (01/16/23 1954)  BP: 123/60 (01/16/23 1954)  SpO2: 95 % (01/16/23 1954) Vital Signs (24h Range):  Temp:  [97.6 °F (36.4 °C)-99 °F (37.2 °C)] 98.3 °F (36.8 °C)  Pulse:  [86-95] 86  Resp:  [16-20] 20  SpO2:  [93 %-99 %] 95 %  BP: (123-163)/(59-73) 123/60     Weight: 107 kg (235 lb 14.3 oz)  Body mass index is 38.07 kg/m².  No intake or output data in the 24 hours ending 01/17/23 0004     Physical Exam  Vitals and nursing note reviewed.   Constitutional:       Appearance: Normal appearance.   HENT:      Head: Normocephalic and atraumatic.   Eyes:      Extraocular Movements: Extraocular movements intact.      Pupils: Pupils are equal, round, and reactive to light.   Cardiovascular:      Rate and Rhythm: Normal rate and regular rhythm.   Pulmonary:      Effort: Pulmonary effort is normal. No respiratory distress.   Abdominal:      General: There is no distension.   Musculoskeletal:         General: Normal range of motion.      Cervical back: Normal range of motion.   Skin:     Comments: L foot wound   Neurological:      General: No focal deficit present.      Mental Status: She is alert and oriented to person, place, and time.   Psychiatric:         Mood and Affect: Mood normal.         Behavior: Behavior normal.       Significant Labs: All pertinent labs within the past 24 hours have been reviewed.  CBC:   Recent Labs   Lab 01/16/23  0514   WBC 12.54   HGB 9.9*   HCT 31.6*   *     CMP:   Recent Labs   Lab 01/16/23  0514   *   K 4.8   CL 96   CO2 31*   *   BUN 14   CREATININE 0.6   CALCIUM 8.9   ANIONGAP 7*       Significant Imaging: I have reviewed all pertinent imaging results/findings within the past 24 hours.      Assessment/Plan:      * Diabetic ulcer of left midfoot associated with type 2 diabetes mellitus, limited to breakdown of skin  Admitted with worsening L plantar DM foot wound. S/p I&D in 12/2022 with bone cultures no growth and treated  for skin/soft tissue infection with bactrim x10 days. Worsening with enlarging wound and cellulitis involving the foot and the leg.   - On vanc, ctx discotninued  - Podiatry consulted     Patient's FSGs are uncontrolled due to hyperglycemia on current medication regimen.  Last A1c reviewed-   Lab Results   Component Value Date    HGBA1C 7.1 (H) 12/22/2022     Most recent fingerstick glucose reviewed-   Recent Labs   Lab 01/14/23  1159 01/14/23  1644 01/14/23 2022 01/14/23  2328   POCTGLUCOSE 169* 262* 292* 228*     Current correctional scale  Low  Maintain anti-hyperglycemic dose as follows-   Antihyperglycemics (From admission, onward)    Start     Stop Route Frequency Ordered    01/09/23 2100  insulin detemir U-100 pen 10 Units         -- SubQ Nightly 01/09/23 1620    01/09/23 1730  insulin aspart U-100 pen 3 Units         -- SubQ 3 times daily with meals 01/09/23 1620    01/09/23 1719  insulin aspart U-100 pen 0-5 Units         -- SubQ Before meals & nightly PRN 01/09/23 1620        Hold Oral hypoglycemics while patient is in the hospital.  Podiatry consulted. Blood cultures are NG  Wound with Staph. ID consulted for recs for discharge    MRSA and Enteroccocus on wound cultures. Follow podiatry/ID recs.  -continue antibiotics and wound care. Follow cultures and path reports     Charcot's joint of left foot  This contributes to wound formation       Cellulitis of left foot  See DM foot wound      Iron deficiency anemia  Iron deficient by labs 12/2022. Not appropriate for IV iron in setting of active infection.   - started PO iron       Hyponatremia  Na slightly low on admit. May be attributed to antipsychotics. Not symptomatic.   - BMP in AM      Class 2 severe obesity with serious comorbidity and body mass index (BMI) of 39.0 to 39.9 in adult  Body mass index is 39.71 kg/m². Morbid obesity complicates all aspects of disease management from diagnostic modalities to treatment. Weight loss encouraged and health  benefits explained to patient.         Thrombocytosis  Plts elevated most likely due to iron deficiency. Treat iron deficiency.       Cellulitis of left lower extremity  See DM foot wound      Bipolar 1 disorder  No signs of acute bibi or depression. She is NOT taking carbamazepine.   - continue risperdal- takes 3mg BID  - continue depakote- takes 1500mg QHS      History of DVT (deep vein thrombosis)  History of DVT and PE. Most recent US shows no DVT.   - continue home eliquis       Tobacco abuse  Patient was counseled on smoking cessation for 4 minutes. Encouraged cessation.       Hyperlipidemia  Continue home pravastatin       COPD (chronic obstructive pulmonary disease)  No PFTs to review. No signs of acute exacerbation. Stable on room air.       Essential hypertension  BP is borderline. Hold home metoprolol and lisinopril for now.       Type II diabetes mellitus with neurological manifestations  With neuropathy affecting feet. On gabapentin but not filled since 11/2022  - continue gabapentin       VTE Risk Mitigation (From admission, onward)         Ordered     apixaban tablet 5 mg  2 times daily         01/09/23 1620     IP VTE HIGH RISK PATIENT  Once         01/09/23 1620     Reason for No Pharmacological VTE Prophylaxis  Once        Question:  Reasons:  Answer:  Already adequately anticoagulated on oral Anticoagulants    01/09/23 1620                      I have completed this tele-visit without the assistance of a telepresenter.    The attending portion of this evaluation, treatment, and documentation was performed per Mariam Montano MD via Telemedicine AudioVisual using the secure [employer] software platform with 2 way audio/video. The provider was located off-site and the patient is located in the hospital. The aforementioned video software was utilized to document the relevant history and physical exam    Mariam Montano MD  Department of Hospital Medicine   [facility]

## 2023-01-17 NOTE — NURSING
Attempt to draw from PICC line, flushed line and line is patent. Line does not give return blood. Notified lab and spoke with Kayla and informed that patient need Lab collected and patient has order for Vanco draw scheduled at 7:00p with no success in return blood from PICC line. Informed lab that patient no longer is unit collect.

## 2023-01-17 NOTE — SUBJECTIVE & OBJECTIVE
Interval History: Pt afebrile and HDS. Stable on room air. Continue wound care. Continue vanc. PICC line in place. Pending placement to LT[facility]    Review of Systems   Constitutional:  Positive for activity change and fatigue. Negative for fever.   Respiratory:  Negative for shortness of breath.    Cardiovascular:  Negative for chest pain.   Gastrointestinal:  Negative for abdominal pain.   Skin:  Positive for wound.   Neurological:  Negative for dizziness and headaches.   All other systems reviewed and are negative.  Objective:     Vital Signs (Most Recent):  Temp: 98.3 °F (36.8 °C) (01/16/23 1954)  Pulse: 86 (01/16/23 1954)  Resp: 20 (01/16/23 1954)  BP: 123/60 (01/16/23 1954)  SpO2: 95 % (01/16/23 1954) Vital Signs (24h Range):  Temp:  [97.6 °F (36.4 °C)-99 °F (37.2 °C)] 98.3 °F (36.8 °C)  Pulse:  [86-95] 86  Resp:  [16-20] 20  SpO2:  [93 %-99 %] 95 %  BP: (123-163)/(59-73) 123/60     Weight: 107 kg (235 lb 14.3 oz)  Body mass index is 38.07 kg/m².  No intake or output data in the 24 hours ending 01/17/23 0004     Physical Exam  Vitals and nursing note reviewed.   Constitutional:       Appearance: Normal appearance.   HENT:      Head: Normocephalic and atraumatic.   Eyes:      Extraocular Movements: Extraocular movements intact.      Pupils: Pupils are equal, round, and reactive to light.   Cardiovascular:      Rate and Rhythm: Normal rate and regular rhythm.   Pulmonary:      Effort: Pulmonary effort is normal. No respiratory distress.   Abdominal:      General: There is no distension.   Musculoskeletal:         General: Normal range of motion.      Cervical back: Normal range of motion.   Skin:     Comments: L foot wound   Neurological:      General: No focal deficit present.      Mental Status: She is alert and oriented to person, place, and time.   Psychiatric:         Mood and Affect: Mood normal.         Behavior: Behavior normal.       Significant Labs: All pertinent labs within the past 24 hours have been  reviewed.  CBC:   Recent Labs   Lab 01/16/23  0514   WBC 12.54   HGB 9.9*   HCT 31.6*   *     CMP:   Recent Labs   Lab 01/16/23 0514   *   K 4.8   CL 96   CO2 31*   *   BUN 14   CREATININE 0.6   CALCIUM 8.9   ANIONGAP 7*       Significant Imaging: I have reviewed all pertinent imaging results/findings within the past 24 hours.

## 2023-01-17 NOTE — PLAN OF CARE
[facility]     Department of Hospital Medicine     [address] [place] [address]     [phone] after hours  [phone] fax       FACILITY TRANSFER ORDERS    01/17/2023    Admit to Nursing Home:  LTAC Bed         Diagnoses:  Active Hospital Problems    Diagnosis  POA    *Diabetic ulcer of left midfoot associated with type 2 diabetes mellitus, limited to breakdown of skin [E11.621, L97.421]  Yes    Charcot's joint of left foot [M14.672]  Yes    Cellulitis of left foot [L03.116]  Yes    Iron deficiency anemia [D50.9]  Yes    Hyponatremia [E87.1]  Yes    Class 2 severe obesity with serious comorbidity and body mass index (BMI) of 39.0 to 39.9 in adult [E66.01, Z68.39]  Not Applicable    Cellulitis of left lower extremity [L03.116]  Yes    Thrombocytosis [D75.839]  Yes    Bipolar 1 disorder [F31.9]  Yes     Chronic    History of DVT (deep vein thrombosis) [Z86.718]  Not Applicable     Chronic    Tobacco abuse [Z72.0]  Yes     Chronic    Hyperlipidemia [E78.5]  Yes     Overview:   dx update    Formatting of this note might be different from the original.  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       Patient is homebound due to:  Diabetic ulcer of left midfoot associated with type 2 diabetes mellitus, limited to breakdown of skin    Allergies:  Review of patient's allergies indicates:   Allergen Reactions    Morphine Other (See Comments)     Patient had a psychotic episode after taking Morphine  Agitation, hallucinations    Penicillins Anaphylaxis     Tolerated cephalosporins in the past    Januvia [sitagliptin] Hives    Carbamazepine Other (See Comments)     hyponatremia       Vitals:       Every shift (Skilled Nursing  patients)    Diet: diabetic/cardiac   Supplement:  1 can every three times a day with meals                         Type:   Glucerna       Acitivities:      - Up in a chair each morning as tolerated   - Ambulate with assistance to bathroom   - Scheduled walks once each shift (every 8 hours)    LABS:  Per facility protocol    Nursing Precautions:     - Aspiration precautions:             - Total assistance with meals            -  Upright 90 degrees befor during and after meals             -  Suction at bedside          - Fall precautions per nursing home protocol   - Seizure precaution per snf protocol   - Decubitus precautions:        -  for positioning   - Pressure reducing foam mattress   - Turn patient every two hours. Use wedge pillows to anchor patient    CONSULTS:     Physical Therapy to evaluate and treat     Occupational Therapy to evaluate and treat     Speech Therapy  to evaluate and treat        MISCELLANEOUS CARE:               Routine Skin for Bedridden Patients:  Apply moisture barrier cream to all    skin folds and wet areas in perineal area daily and after baths and                           all bowel movements.    Wound care:  1. Cleanse wound with Vashe  2. Paint with betadine  3. Apply aquacel AG followed by mepilex foam  4. Cover with ABD pad wrap with cast padding, kerlix and ACE.   Thanks.      DIABETES CARE:      Check blood sugar:       Fingerstick blood sugar AC and HS   Fingerstick blood sugar every 6 hours if unable to eat      Report CBG < 60 or > 400 to physician.                                          Insulin Sliding Scale          Glucose  Novolog Insulin Subcutaneous        0 - 60   Orange juice or glucose tablet, hold insulin      No insulin   201-250  2 units   251-300  4 units   301-350  6 units   351-400  8 units   >400   10 units then call physician      Intravenous antibiotics:  IV vancomycin, pharmacy to dose - TENTATIVE PLAN (pending enterococcus  susceptibilities)        Therapy Duration:  approx 3-4 weeks and will re-evaluate clinically (could be extended to 6 weeks if path confirms osteo)     Estimated end date of IV antibiotics: 2/5/23     Outpatient Weekly Labs:     Order the following labs to be drawn on Mondays:   CBC  CMP   CRP  Vancomycin trough. Target 15-20     If discharged on vancomycin IV, order the following additional labs to be drawn on Thursdays:  CMP   Vancomycin trough. Target 15-20     If vancomycin trough is not at target (15-20) prior to discharge, schedule vancomycin trough to be drawn before their fourth outpatient dose.     Fax Lab Results to Infectious Diseases Provider: Dr Zavala     [facility] Fax Number: [phone]      Medications: Discontinue all previous medication orders, if any. See new list below.    @DISCHMEDS(<NOROUTINE> error)@          _________________________________  Mariam Montano MD  01/17/2023

## 2023-01-17 NOTE — PROGRESS NOTES
Pharmacokinetic Assessment Follow Up: IV Vancomycin    Vancomycin serum concentration assessment(s):    The scheduled random level was drawn late. The measurement is below the definitive target range of 15 to 20 mcg/ml       Vancomycin Regimen Plan:    Change regimen to Vancomycin 1500 mg IV every 8 hours with next serum trough concentration measured at 18:30 prior to third dose on 1/17/23    Drug levels (last 3 results):  Recent Labs   Lab Result Units 01/14/23  1712 01/16/23  0514 01/16/23  2344   Vancomycin, Random ug/mL  --   --  11.4   Vancomycin-Trough ug/mL 16.2 22.0  --        Pharmacy will continue to follow and monitor vancomycin.    Please contact pharmacy at extension [phone] for questions regarding this assessment.    Thank you for the consult,   Maria E Lozano       Patient brief summary:  Audrey Natarajan is a 52 y.o. female initiated on antimicrobial therapy with IV Vancomycin for treatment of skin & soft tissue infection    The patient's current regimen is Vancomycin 1500 mg IV q8h    Drug Allergies:   Review of patient's allergies indicates:   Allergen Reactions    Morphine Other (See Comments)     Patient had a psychotic episode after taking Morphine  Agitation, hallucinations    Penicillins Anaphylaxis     Tolerated cephalosporins in the past    Januvia [sitagliptin] Hives    Carbamazepine Other (See Comments)     hyponatremia       Actual Body Weight:   107 kg    Renal Function:   Estimated Creatinine Clearance: 138.8 mL/min (based on SCr of 0.6 mg/dL).,     Dialysis Method (if applicable):  N/A    CBC (last 72 hours):  Recent Labs   Lab Result Units 01/16/23  0514   WBC K/uL 12.54   Hemoglobin g/dL 9.9*   Hematocrit % 31.6*   Platelets K/uL 559*   Gran % % 44.5   Lymph % % 33.9   Mono % % 14.2   Eosinophil % % 5.7   Basophil % % 0.8   Differential Method  Automated       Metabolic Panel (last 72 hours):  Recent Labs   Lab Result Units 01/16/23  0514   Sodium mmol/L 134*   Potassium mmol/L 4.8    Chloride mmol/L 96   CO2 mmol/L 31*   Glucose mg/dL 221*   BUN mg/dL 14   Creatinine mg/dL 0.6   Magnesium mg/dL 1.8   Phosphorus mg/dL 3.2       Vancomycin Administrations:  vancomycin given in the last 96 hours                     vancomycin (VANCOCIN) 2,000 mg in dextrose 5 % 500 mL IVPB (mg) 2,000 mg New Bag 01/16/23 0615     2,000 mg New Bag 01/15/23 1751     2,000 mg New Bag  0602     2,000 mg New Bag 01/14/23 1804     2,000 mg New Bag  0517     2,000 mg New Bag 01/13/23 1701     2,000 mg New Bag  0626                    Microbiologic Results:  Microbiology Results (last 7 days)       Procedure Component Value Units Date/Time    Aerobic culture [[id number]] Collected: 01/13/23 1448    Order Status: Completed Specimen: Bone from Foot, Left Updated: 01/16/23 0801     Aerobic Bacterial Culture No growth    AFB Culture & Smear [[id number]] Collected: 01/13/23 1448    Order Status: Completed Specimen: Bone from Foot, Left Updated: 01/15/23 2127     AFB Culture & Smear Culture in progress    Culture, Anaerobe [[id number]] Collected: 01/13/23 1448    Order Status: Completed Specimen: Bone from Foot, Left Updated: 01/15/23 0600     Anaerobic Culture Culture in progress    Gram stain [[id number]] Collected: 01/13/23 1448    Order Status: Completed Specimen: Bone from Foot, Left Updated: 01/14/23 0848     Gram Stain Result Rare WBC's      No organisms seen    Fungus culture [[id number]] Collected: 01/13/23 1448    Order Status: Sent Specimen: Bone from Foot, Left Updated: 01/13/23 1503    Blood culture #1 **CANNOT BE ORDERED STAT** [[id number]] Collected: 01/09/23 1205    Order Status: Completed Specimen: Blood from Peripheral, Antecubital, Left Updated: 01/13/23 1503     Blood Culture, Routine No Growth after 4 days.    Blood culture #2 **CANNOT BE ORDERED STAT** [[id number]] Collected: 01/09/23 1214    Order Status: Completed Specimen: Blood from Peripheral, Antecubital, Left Updated: 01/13/23 1503     Blood Culture,  Routine No Growth after 4 days.    Aerobic culture [[id number]]  (Abnormal)  (Susceptibility) Collected: 01/09/23 1647    Order Status: Completed Specimen: Wound from Foot, Left Updated: 01/13/23 0721     Aerobic Bacterial Culture Results called to and read back by:Marcy dewitt 01/12/2023      08:01      METHICILLIN RESISTANT STAPHYLOCOCCUS AUREUS  Moderate      Gram stain [[id number]] Collected: 01/09/23 1647    Order Status: Completed Specimen: Wound from Foot, Left Updated: 01/10/23 0803     Gram Stain Result No WBC's      No organisms seen

## 2023-01-17 NOTE — PLAN OF CARE
[facility] - Med Surg  Discharge Final Note    Primary Care Provider: Donaldo Pena MD    Expected Discharge Date: 1/17/2023    All needs met. SW notified patient that she is discharging to [facility] in [place]. MELVI notified nurse Anju that patient is ready for discharge from case management standpoint.     Call Report to [phone] to charge nurse. Patient is going to Room 107.     ADT 30 order placed for Van Transportation.  Requested  time: 5:30 PM [facility]  If transportation does not arrive at ETA time nurse will be instructed to follow protocol for transportation below:  How can I get in touch directly with dispatch, if needed?                 Non-emergent dispatch: [phone]      +++NURSING:  If Van does not arrive at requested time please call the above Non Emergent Dispatcher.  If issue not resolved please escalate to your charge nurse for further instructions.      Final Discharge Note (most recent)       Final Note - 01/17/23 1639          Final Note    Assessment Type Final Discharge Note     Anticipated Discharge Disposition Long Term Acute Care     What phone number can be called within the next 1-3 days to see how you are doing after discharge? [phone]        Post-Acute Status    Post-Acute Authorization Placement   LTAC    Post-Acute Placement Status Set-up Complete/Auth obtained     Coverage Medicaid     Discharge Delays None known at this time                     Important Message from Medicare

## 2023-01-17 NOTE — NURSING
Wound care in progress to left planter foot as ordered with bandage of moderate amount of drainage to intact bandage. Patient tolerated well and denies pain at time of wound care dressing change. No foul odor observed at time of dressing change. As ordered wound care dressing change provided as ordered.

## 2023-01-18 ENCOUNTER — PATIENT MESSAGE (OUTPATIENT)
Dept: ADMINISTRATIVE | Facility: HOSPITAL | Age: 53
End: 2023-01-18
Payer: MEDICAID

## 2023-01-18 NOTE — ASSESSMENT & PLAN NOTE
- diagnosed at last hospital stay  - holding anticoagulation for concern for GI bleed     Subjective


Progress Note Date: 01/18/23








History of present illness:


Patient is an 83-year-old female with a known history of atrial fibrillation,

severe chronic obstructive lung disease, on home O2 who stopped smoking about a 

year ago.We will consult to see the patient for atrial fibrillation.  Patient 

initially presented to the ER.  EKG showed atrial fibrillation with rapid 

ventricular rate of 178.  Patient was started on Cardizem at 5 mg an hour and 

put on BiPAP.  While in the ER patient converted to sinus rhythm.  She is on 

eliquis for anticoagulation.  Heart rate is controlled in the 70s to 80s.  Chest

x-ray showed mild lung opacities likely atelectasis.  Patient had an 

echocardiogram on 12/16/2022 which showed normal sinus rhythm with severe 

tricuspid regurgitation, severe pulmonary hypertension, right atrium 

enlargement, and right ventricular dilation.  She is also currently being 

treated with IV antibiotics for UTI.  Will obtain 2-D echocardiogram.  Continue 

with eliquis





1/16


Patient had another episode of A. fib with RVR started on a Cardizem drip last 

night and subsequently converted to sinus rhythm.  Patient is off Cardizem at 

this time.  Heart rate is in the 70s and /64. PO 91% on 15L HF. She is 

continued on eliquis.  Echocardiogram reveals normal LV systolic function.  

Severe pulmonary hypertension.  Moderate to severe tricuspid regurgitation.  

Mild mitral regurgitation.  Potassium 4.0, BUN 31 creatinine 1.15.





1/17


Patient is continuing to require high flow oxygen at 12 L, pulse ox 93%.  

Patient states that her breathing is better from yesterday.  Patient is on IV 

Lasix 40 mg daily and diuresing well.


Echocardiogram reveals normal LV systolic function.  Severe pulmonary 

hypertension.  Moderate to severe tricuspid regurgitation.  Mild mitral 

regurgitation.


Hemoglobin 9.1.





1/18


Patient is continued to require high flow oxygen 12 L with pulse ox is 96%.  

Heart rate is been in the 80s, blood pressure 143/80, cardiac monitor is sinus 

rhythm.  Patient denies any new complaints.  Family is looking into hospice 

care.








Physical examination:


Gen: This is an 83-year-old  female.  She is resting in bed appears to 

be comfortable


VS: reviewed


HEENT: Head is atraumatic, normocephalic. Pupils equal, round. Sclerae is 

anicteric. 


NECK: No JVD.


LUNGS: Scattered rhonchi.  No intercostal retractions.


HEART: Regular rate and rhythm.  Systolic murmur. 


EXTREMITIES: No pedal edema.  No calf tenderness.


NEUROLOGICAL: Patient is awake, alert and oriented x3. 





 





Assessment:


Persistent atrial fibrillation with RVR, currently in sinus rhythm


Acute diastolic heart failure


Lactic acidosis


Chronic kidney disease











Plan:


Continue patient on eliquis 2.5 mg twice daily and Toprol-XL 25 mg daily


Continue Lasix 40 mg IV daily


Monitor electrolytes, renal function, weights and I&O


Further recommendations as patient progresses.





Nurse practitioner note has been reviewed, I agree with documented findings and 

plan of care.  Patient was seen and examined.








Objective





- Vital Signs


Vital signs: 


                                   Vital Signs











Temp  96.9 F L  01/18/23 08:00


 


Pulse  82   01/18/23 08:00


 


Resp  14   01/18/23 04:00


 


BP  143/80   01/18/23 08:00


 


Pulse Ox  96   01/18/23 08:00


 


FiO2  50   01/18/23 04:00








                                 Intake & Output











 01/17/23 01/18/23 01/18/23





 18:59 06:59 18:59


 


Intake Total 960  480


 


Output Total 1400 2200 


 


Balance -440 -2200 480


 


Weight  63 kg 


 


Intake:   


 


  Oral 960  480


 


Output:   


 


  Urine 1400 2200 


 


Other:   


 


  Voiding Method External Catheter External Catheter 














- Labs


CBC & Chem 7: 


                                 01/18/23 08:55





                                 01/18/23 08:55


Labs: 


                  Abnormal Lab Results - Last 24 Hours (Table)











  01/17/23 01/17/23 01/17/23 Range/Units





  11:38 16:51 20:55 


 


POC Glucose (mg/dL)  263 H  405 H  224 H  ()  mg/dL














  01/18/23 Range/Units





  06:23 


 


POC Glucose (mg/dL)  222 H  ()  mg/dL

## 2023-01-19 ENCOUNTER — TELEPHONE (OUTPATIENT)
Dept: SMOKING CESSATION | Facility: CLINIC | Age: 53
End: 2023-01-19
Payer: MEDICAID

## 2023-01-19 NOTE — DISCHARGE SUMMARY
[facility] Medicine  Discharge Summary      Patient Name: Audrey Natarajan  MRN: [id number]  Patient Class: IP- Inpatient  Admission Date: 1/9/2023  Hospital Length of Stay: 8 days  Discharge Date and Time: 1/17/2023  7:08 PM  Attending Physician: No att. providers found   Discharging Provider: Mariam Montano MD  Primary Care Provider: Donaldo Pena MD      HPI:   Ms Audrey Natarajan is a 52 y.o. woman with DM who presents with worsening L leg swelling.     She was recently admitted 12/22-27/2022 with worsening L plantar foot ulceration. S/p debridement by Podiatry. Bone cultures no growth. Wound culture with MRSA. Seen by ID who recommended bactrim.    She took the bactrim as prescribed with last dose taken 1/5/2023. She noticed improvement in swelling and erythema on bactrim but then noticed worsening swelling and erythema to the L foot and leg. She followed up in wound care on 12/29 and then again on 1/6. Per notes on 1/6, wound had doubled in size.     She has noticed decreased appetite, increased thirst, increased urination, and high glucoses. She does smoke cigarettes.           * No surgery found *      Hospital Course:   Patient admitted for L diabetic foot ulcer.   Diabetic ulcer of left midfoot associated with type 2 diabetes mellitus, limited to breakdown of skin  Admitted with worsening L plantar DM foot wound. S/p I&D in 12/2022 with bone cultures no growth and treated for skin/soft tissue infection with bactrim x10 days. Worsening with enlarging wound and cellulitis involving the foot and the leg.   - On vanc, ctx discotninued  - Podiatry consulted   Podiatry consulted. Blood cultures are NG  Wound with Staph. ID consulted for recs for discharge     MRSA and Enteroccocus on wound cultures. Follow podiatry/ID recs.  -continue antibiotics and wound care. Vanc for approx 3-4 weeks and will re-evaluate clinically (could be extended to 6 weeks if path confirms osteo)      Estimated end date of IV antibiotics: 2/5/23     Charcot's joint of left foot  This contributes to wound formation         Cellulitis of left foot  See DM foot wound        Iron deficiency anemia  Iron deficient by labs 12/2022. Not appropriate for IV iron in setting of active infection.   - started PO iron         Hyponatremia  Na slightly low on admit. May be attributed to antipsychotics. Not symptomatic.   - improced        Class 2 severe obesity with serious comorbidity and body mass index (BMI) of 39.0 to 39.9 in adult  Body mass index is 39.71 kg/m². Morbid obesity complicates all aspects of disease management from diagnostic modalities to treatment. Weight loss encouraged and health benefits explained to patient.                   Thrombocytosis  Plts elevated most likely due to iron deficiency. Treat iron deficiency.         Cellulitis of left lower extremity  See DM foot wound        Bipolar 1 disorder  No signs of acute bibi or depression. She is NOT taking carbamazepine.   - continue risperdal- takes 3mg BID  - continue depakote- takes 1500mg QHS        History of DVT (deep vein thrombosis)  History of DVT and PE. Most recent US shows no DVT.   - continue home eliquis         Tobacco abuse  Patient was counseled on smoking cessation for 4 minutes. Encouraged cessation.         Hyperlipidemia  Continue home pravastatin         COPD (chronic obstructive pulmonary disease)  No PFTs to review. No signs of acute exacerbation. Stable on room air.         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 On gabapentin but not filled since 11/2022  - continue gabapentin          Goals of Care Treatment Preferences:  Code Status: Full Code    Living Will: Yes              Consults:   Consults (From admission, onward)        Status Ordering Provider     Inpatient consult to PICC team (BENI)  Once         Provider:  (Not yet assigned)    Completed ESTER ALLRED     Inpatient consult to Infectious Diseases  Once        Provider:  Beverly Zavala MD    Completed ESTER ALLRED     Inpatient consult to Podiatry  Once        Provider:  Halle Gill DPM    Completed EVE VASQUEZ          No new Assessment & Plan notes have been filed under this hospital service since the last note was generated.  Service: Hospital Medicine    Final Active Diagnoses:    Diagnosis Date Noted POA    PRINCIPAL PROBLEM:  Diabetic ulcer of left midfoot associated with type 2 diabetes mellitus, limited to breakdown of skin [E11.621, L97.421] 02/11/2021 Yes    Charcot's joint of left foot [M14.672]  Yes    Cellulitis of left foot [L03.116] 05/06/2022 Yes    Iron deficiency anemia [D50.9] 05/06/2022 Yes    Hyponatremia [E87.1] 05/04/2022 Yes    Class 2 severe obesity with serious comorbidity and body mass index (BMI) of 39.0 to 39.9 in adult [E66.01, Z68.39] 08/10/2016 Not Applicable    Cellulitis of left lower extremity [L03.116] 07/16/2016 Yes    Thrombocytosis [D75.839] 07/16/2016 Yes    Bipolar 1 disorder [F31.9] 04/13/2015 Yes     Chronic    History of DVT (deep vein thrombosis) [Z86.718] 04/13/2015 Not Applicable     Chronic    Tobacco abuse [Z72.0] 03/06/2014 Yes     Chronic    Hyperlipidemia [E78.5] 02/13/2014 Yes    COPD (chronic obstructive pulmonary disease) [J44.9] 10/11/2013 Yes     Chronic    Type II diabetes mellitus with neurological manifestations [E11.49] 06/06/2013 Yes    Essential hypertension [I10] 06/06/2013 Yes     Chronic      Problems Resolved During this Admission:       Discharged Condition: stable    Disposition: Long Term Acute Care    Follow Up:    Patient Instructions:      Ambulatory referral/consult to Infectious Disease   Standing Status: Future   Referral Priority: Routine Referral Type: Consultation   Referral Reason: Specialty Services Required   Requested Specialty:  Infectious Diseases   Number of Visits Requested: 1     Diet Cardiac     Diet diabetic     Notify your health care provider if you experience any of the following:  temperature >100.4     Notify your health care provider if you experience any of the following:  persistent nausea and vomiting or diarrhea     Notify your health care provider if you experience any of the following:  severe uncontrolled pain     Notify your health care provider if you experience any of the following:  redness, tenderness, or signs of infection (pain, swelling, redness, odor or green/yellow discharge around incision site)     Notify your health care provider if you experience any of the following:  difficulty breathing or increased cough     Notify your health care provider if you experience any of the following:  severe persistent headache     Notify your health care provider if you experience any of the following:  worsening rash     Notify your health care provider if you experience any of the following:  persistent dizziness, light-headedness, or visual disturbances     Notify your health care provider if you experience any of the following:  increased confusion or weakness     Send follow up & questions to   Order Comments: Patient's primary care physician: Donaldo Pena MD     Activity as tolerated       Significant Diagnostic Studies: Labs: CMP No results for input(s): NA, K, CL, CO2, GLU, BUN, CREATININE, CALCIUM, PROT, ALBUMIN, BILITOT, ALKPHOS, AST, ALT, ANIONGAP, ESTGFRAFRICA, EGFRNONAA in the last 48 hours. and CBC No results for input(s): WBC, HGB, HCT, PLT in the last 48 hours.    Pending Diagnostic Studies:     Procedure Component Value Units Date/Time    Specimen to Pathology, Surgery Other [[id number]] Collected: 01/13/23 1520    Order Status: Sent Lab Status: In process Updated: 01/18/23 0622    Specimen: Tissue          Medications:  Reconciled Home Medications:      Medication List      START taking these  medications    dextrose 5 % SolP 250 mL with vancomycin 1.5 gram SolR 1,500 mg  Inject 1,500 mg into the vein every 8 (eight) hours. for 19 days     ferrous sulfate 325 (65 FE) MG EC tablet  Take 1 tablet (325 mg total) by mouth once daily.     insulin aspart U-100 100 unit/mL (3 mL) Inpn pen  Commonly known as: NovoLOG  Inject 8 Units into the skin 3 (three) times daily.     insulin detemir U-100 100 unit/mL (3 mL) Inpn pen  Commonly known as: Levemir FLEXTOUCH  Inject 16 Units into the skin every evening.     LIDOcaine 5 %  Commonly known as: LIDODERM  Place 1 patch onto the skin once daily. Remove & Discard patch within 12 hours or as directed by MD     magnesium hydroxide 400 mg/5 ml 400 mg/5 mL Susp  Commonly known as: MILK OF MAGNESIA  Take 30 mLs (2,400 mg total) by mouth daily as needed.     oxyCODONE-acetaminophen  mg per tablet  Commonly known as: PERCOCET  Take 1 tablet by mouth every 6 (six) hours as needed for Pain.     polyethylene glycol 17 gram Pwpk  Commonly known as: GLYCOLAX  Take 17 g by mouth once daily.     senna-docusate 8.6-50 mg 8.6-50 mg per tablet  Commonly known as: PERICOLACE  Take 1 tablet by mouth 2 (two) times daily.        CHANGE how you take these medications    divalproex 500 MG Tbec  Commonly known as: DEPAKOTE  Take 1 tablet (500 mg total) by mouth once daily. PO QAM  What changed:   · how much to take  · when to take this     hydrOXYzine 50 MG tablet  Commonly known as: ATARAX  Take 0.5 tablets (25 mg total) by mouth 4 (four) times daily as needed for Itching or Anxiety.  What changed:   · how much to take  · reasons to take this        CONTINUE taking these medications    acetaminophen 500 MG tablet  Commonly known as: TYLENOL  Take 2 tablets (1,000 mg total) by mouth every 6 (six) hours as needed for Pain.     albuterol 90 mcg/actuation inhaler  Commonly known as: PROVENTIL/VENTOLIN HFA  INHALE 2 PUFFS INTO THE LUNGS EVERY 6 HOURS AS NEEDED FOR WHEEZING. RESCUE      albuterol-ipratropium 2.5 mg-0.5 mg/3 mL nebulizer solution  Commonly known as: DUO-NEB  Take 3 mLs by nebulization every 6 (six) hours as needed for Wheezing or Shortness of Breath. Rescue     ammonium lactate 12 % lotion  Commonly known as: LAC-HYDRIN  APPL Y ONCE TOPICALLY TWICE DAILY FOR 30 DAYS     aspirin 81 MG Chew  Take 1 tablet (81 mg total) by mouth once daily.     bumetanide 1 MG tablet  Commonly known as: BUMEX  Take 1 tablet (1 mg total) by mouth once daily.     DUPIXENT  mg/2 mL Pnij  Generic drug: dupilumab  Inject into the skin.     ELIQUIS 5 mg Tab  Generic drug: apixaban  Take 1 tablet (5 mg total) by mouth 2 (two) times daily.     fluticasone propionate 50 mcg/actuation nasal spray  Commonly known as: FLONASE  2 sprays (100 mcg total) by Each Nostril route daily as needed (Nasal congestion).     fluticasone-salmeterol 250-50 mcg/dose 250-50 mcg/dose diskus inhaler  Commonly known as: ADVAIR  Inhale 1 puff into the lungs 2 (two) times daily. Controller     gabapentin 300 MG capsule  Commonly known as: NEURONTIN  TAKE 2 CAPSULES(600 MG) BY MOUTH TWICE DAILY     lisinopriL 10 MG tablet  Take 1 tablet (10 mg total) by mouth once daily.     loratadine 10 mg tablet  Commonly known as: CLARITIN  Take 1 tablet (10 mg total) by mouth once daily.     metFORMIN 1000 MG tablet  Commonly known as: GLUCOPHAGE  Take 1 tablet (1,000 mg total) by mouth 2 (two) times daily with meals.     methocarbamoL 500 MG Tab  Commonly known as: ROBAXIN  Take 500 mg by mouth. Frequency could not be confirmed.     metoprolol tartrate 25 MG tablet  Commonly known as: LOPRESSOR  Take 1 tablet (25 mg total) by mouth 2 (two) times daily.     multivitamin Tab  Take 1 tablet by mouth once daily.     nystatin powder  Commonly known as: NYSTOP  APPLY TO ABDOMINAL AND BREAST SKIN FOLD TWICE DAILY.     OZEMPIC 1 mg/dose (4 mg/3 mL)  Generic drug: semaglutide  Inject 1 mg into the skin every 7 days.     pantoprazole 40 MG  tablet  Commonly known as: PROTONIX  Take 1 tablet (40 mg total) by mouth once daily.     pravastatin 40 MG tablet  Commonly known as: PRAVACHOL  TAKE 1 TABLET(40 MG) BY MOUTH EVERY EVENING     risperiDONE 3 MG disintegrating tablet  Commonly known as: RISPERDAL M-TABS  Take 1 tablet (3 mg total) by mouth 2 (two) times daily.     VYVANSE 40 MG Cap  Generic drug: lisdexamfetamine  Take 40 mg by mouth once daily.        STOP taking these medications    traMADoL 50 mg tablet  Commonly known as: ULTRAM            Indwelling Lines/Drains at time of discharge:   Lines/Drains/Airways     Peripherally Inserted Central Catheter Line  Duration           PICC Double Lumen 01/11/23 1220 right basilic 7 days                Time spent on the discharge of patient: 39 minutes         The attending portion of this evaluation, treatment, and documentation was performed per Mariam Montano MD via Telemedicine AudioVisual using the secure [employer] software platform with 2 way audio/video. The provider was located off-site and the patient is located in the hospital. The aforementioned video software was utilized to document the relevant history and physical exam    Mariam Montano MD  Department of Hospital Medicine  [facility]

## 2023-01-19 NOTE — TELEPHONE ENCOUNTER
Smoking Cessation counselor attempted to contact patient in attempt to reschedule no show intake appointment, but patient did not answer.  Counselor was unable to leave a message with rescheduling information due to patient's voicemail box being full.     Carley Palomo RRT,MSW,LMSW,TTS  [phone]

## 2023-01-20 LAB
FINAL PATHOLOGIC DIAGNOSIS: NORMAL
GROSS: NORMAL
Lab: NORMAL

## 2023-01-25 DIAGNOSIS — E11.42 TYPE 2 DIABETES MELLITUS WITH DIABETIC POLYNEUROPATHY, WITHOUT LONG-TERM CURRENT USE OF INSULIN: ICD-10-CM

## 2023-01-25 NOTE — TELEPHONE ENCOUNTER
Care Due:                  Date            Visit Type   Department     Provider  --------------------------------------------------------------------------------                                             [facility]     MEDICINE/  Last Visit: 01-      FOLLOW UP    INTERNAL MED   Donaldo MARTINEZ -         [facility]      MEDICINE/  Next Visit: 04-      CARE ([facility])   INTERNAL MED   Donaldo Pena                                                            Last  Test          Frequency    Reason                     Performed    Due Date  --------------------------------------------------------------------------------    Lipid Panel.  12 months..  pravastatin..............  Not Found    Overdue    [employer] Embedded Care Gaps. Reference number: [id number]. 1/25/2023   5:12:45 PM CST

## 2023-01-26 RX ORDER — SEMAGLUTIDE 1.34 MG/ML
1 INJECTION, SOLUTION SUBCUTANEOUS
Qty: 3 PEN | Refills: 1 | Status: ON HOLD | OUTPATIENT
Start: 2023-01-26 | End: 2023-03-03 | Stop reason: HOSPADM

## 2023-01-30 LAB — FUNGUS SPEC CULT: NORMAL

## 2023-02-07 NOTE — PROGRESS NOTES
Infectious Disease Clinic Note  2023       Subjective:       Patient ID: Audrey Natarajan is a 52 y.o. female being seen for an new visit.    Chief Complaint: Follow-up    HPI  53y/o M with h/o DM with charcot foot, DVT on Eliquis, PAD, ongoing tobacco abuse admitted  with worsening L leg cellulitis and wound infection who presents for hospital follow-up.    Notably she has had several admits over the past several months for L foot infections ( cultures with MDR MRSA treated with bactrim). Bcx NGTD. MRI from 1/10- Soft tissue ulceration and edema that could represent cellulitis. Podiatry sent wound swab, growing MRSA . Underwent I+D (). Bone path was negative for OM. Completed 4 wks IV vanc on  and was discharged from [facility] yesterday. She reports erythema has improved, but it continues to swell. Wound has continued to improve with wound vac. However, was discharged without it, so is waiting for it to get delivered to home. She denies fevers, chills, sweats or malaise. Reports her appetite is back.           Family History   Problem Relation Age of Onset    Hypertension Father     Diabetes Father     Heart disease Father     Cataracts Father     Diabetes Paternal Grandfather     Heart disease Paternal Grandfather     No Known Problems Mother     Ovarian cancer Maternal Grandmother          from this. ? age     No Known Problems Sister     No Known Problems Brother     No Known Problems Maternal Aunt     No Known Problems Maternal Uncle     No Known Problems Paternal Aunt     No Known Problems Paternal Uncle     No Known Problems Maternal Grandfather     Ovarian cancer Paternal Grandmother     Uterine cancer Neg Hx     Breast cancer Neg Hx     Colon cancer Neg Hx     Amblyopia Neg Hx     Blindness Neg Hx     Cancer Neg Hx     Glaucoma Neg Hx     Macular degeneration Neg Hx     Retinal detachment Neg Hx     Strabismus Neg Hx     Stroke Neg Hx     Thyroid disease Neg Hx      Social History      Socioeconomic History    Marital status: Significant Other   Tobacco Use    Smoking status: Every Day     Packs/day: 1.00     Years: 37.00     Pack years: 37.00     Types: Cigarettes     Last attempt to quit: 2020     Years since quittin.1    Smokeless tobacco: Current    Tobacco comments:     Enrolled in the [employer] on 5/3/14 ([facility] Member ID # [id number]). Ambulatory referral to Smoking Cessation Program   Substance and Sexual Activity    Alcohol use: No     Alcohol/week: 0.0 standard drinks    Drug use: No    Sexual activity: Yes     Partners: Male   Social History Narrative     from her  of 20 years    Lives by herself since 2019     Social Determinants of Health     Financial Resource Strain: Unknown    Difficulty of Paying Living Expenses: Patient refused   Food Insecurity: Unknown    Worried About Running Out of Food in the Last Year: Patient refused    Ran Out of Food in the Last Year: Patient refused   Transportation Needs: Unknown    Lack of Transportation (Medical): Patient refused    Lack of Transportation (Non-Medical): Patient refused   Physical Activity: Unknown    Days of Exercise per Week: Patient refused    Minutes of Exercise per Session: Patient refused   Stress: Unknown    Feeling of Stress : Patient refused   Social Connections: Unknown    Frequency of Communication with Friends and Family: Patient refused    Frequency of Social Gatherings with Friends and Family: Patient refused    Attends Mandaen Services: Patient refused    Active Member of Clubs or Organizations: Patient refused    Attends Club or Organization Meetings: Patient refused    Marital Status: Patient refused   Housing Stability: Unknown    Unable to Pay for Housing in the Last Year: Patient refused    Unstable Housing in the Last Year: Patient refused     Past Surgical History:   Procedure Laterality Date    ABDOMINAL SURGERY      gastric sleeve    BILATERAL OOPHORECTOMY Bilateral  "1/12/2015    CHOLECYSTECTOMY      DEBRIDEMENT OF FOOT Bilateral 5/10/2022    Procedure: DEBRIDEMENT, FOOT;  Surgeon: Maira De Los Santos DPM;  Location: [facility] OR;  Service: Podiatry;  Laterality: Bilateral;    Green' s filter Right 7/4/2012    Right Neck & Tunneled Down.    HERNIA REPAIR      "[place] [facility] [place] Repaires around th [place].", pt. states    INCISION AND DRAINAGE FOOT Left 12/24/2022    Procedure: INCISION AND DRAINAGE, FOOT;  Surgeon: Fahad Razo DPM;  Location: [facility] OR;  Service: Podiatry;  Laterality: Left;    LAPAROSCOPIC CHOLECYSTECTOMY N/A 9/10/2020    Procedure: CHOLECYSTECTOMY, LAPAROSCOPIC;  Surgeon: Montrell Gutierrez MD;  Location: [facility] OR;  Service: General;  Laterality: N/A;  RN PREOP 9/9----COVID Negative  9/9    OVARIAN CYST REMOVAL  3/13/2014    SD REMOVAL OF OVARY/TUBE(S)      SPLENECTOMY, TOTAL  July 2003    TONSILLECTOMY      as a child    TYMPANOSTOMY TUBE PLACEMENT  1976    VEIN SURGERY  2003    Lt leg       Patient's Medications   New Prescriptions    No medications on file   Previous Medications    ACETAMINOPHEN (TYLENOL) 500 MG TABLET    Take 2 tablets (1,000 mg total) by mouth every 6 (six) hours as needed for Pain.    ALBUTEROL (PROVENTIL/VENTOLIN HFA) 90 MCG/ACTUATION INHALER    INHALE 2 PUFFS INTO THE LUNGS EVERY 6 HOURS AS NEEDED FOR WHEEZING. RESCUE    ALBUTEROL-IPRATROPIUM (DUO-NEB) 2.5 MG-0.5 MG/3 ML NEBULIZER SOLUTION    Take 3 mLs by nebulization every 6 (six) hours as needed for Wheezing or Shortness of Breath. Rescue    AMMONIUM LACTATE (LAC-HYDRIN) 12 % LOTION    APPL Y ONCE TOPICALLY TWICE DAILY FOR 30 DAYS    APIXABAN (ELIQUIS) 5 MG TAB    Take 1 tablet (5 mg total) by mouth 2 (two) times daily.    ASPIRIN 81 MG CHEW    Take 1 tablet (81 mg total) by mouth once daily.    BUMETANIDE (BUMEX) 1 MG TABLET    Take 1 tablet (1 mg total) by mouth once daily.    DIVALPROEX (DEPAKOTE) 500 MG TBEC    Take 1 tablet (500 mg total) by mouth once daily. PO QAM    DUPIXENT  " MG/2 ML PNIJ    Inject into the skin.    FERROUS SULFATE 325 (65 FE) MG EC TABLET    Take 1 tablet (325 mg total) by mouth once daily.    FLUTICASONE PROPIONATE (FLONASE) 50 MCG/ACTUATION NASAL SPRAY    2 sprays (100 mcg total) by Each Nostril route daily as needed (Nasal congestion).    FLUTICASONE-SALMETEROL DISKUS INHALER 250-50 MCG    Inhale 1 puff into the lungs 2 (two) times daily. Controller    HYDROXYZINE (ATARAX) 50 MG TABLET    Take 0.5 tablets (25 mg total) by mouth 4 (four) times daily as needed for Itching or Anxiety.    INSULIN DETEMIR U-100 (LEVEMIR FLEXTOUCH) 100 UNIT/ML (3 ML) SUBQ INPN PEN    Inject 22 Units into the skin every evening.    LIDOCAINE (LIDODERM) 5 %    Place 1 patch onto the skin once daily. Remove & Discard patch within 12 hours or as directed by MD    LISINOPRIL 10 MG TABLET    Take 1 tablet (10 mg total) by mouth once daily.    LORATADINE (CLARITIN) 10 MG TABLET    Take 1 tablet (10 mg total) by mouth once daily.    MAGNESIUM HYDROXIDE 400 MG/5 ML (MILK OF MAGNESIA) 400 MG/5 ML SUSP    Take 30 mLs (2,400 mg total) by mouth daily as needed.    METFORMIN (GLUCOPHAGE) 1000 MG TABLET    Take 1 tablet (1,000 mg total) by mouth 2 (two) times daily with meals.    METHOCARBAMOL (ROBAXIN) 500 MG TAB    Take 500 mg by mouth. Frequency could not be confirmed.    METOPROLOL TARTRATE (LOPRESSOR) 25 MG TABLET    Take 1 tablet (25 mg total) by mouth 2 (two) times daily.    MULTIVITAMIN TAB    Take 1 tablet by mouth once daily.    NYSTATIN (NYSTOP) POWDER    APPLY TO ABDOMINAL AND BREAST SKIN FOLD TWICE DAILY.    OXYCODONE-ACETAMINOPHEN (PERCOCET)  MG PER TABLET    Take 1 tablet by mouth every 6 (six) hours as needed for Pain.    PANTOPRAZOLE (PROTONIX) 40 MG TABLET    Take 1 tablet (40 mg total) by mouth once daily.    POLYETHYLENE GLYCOL (GLYCOLAX) 17 GRAM PWPK    Take 17 g by mouth once daily.    PRAVASTATIN (PRAVACHOL) 40 MG TABLET    Take 1 tablet (40 mg total) by mouth every evening.     RISPERIDONE (RISPERDAL M-TABS) 3 MG DISINTEGRATING TABLET    Take 1 tablet (3 mg total) by mouth 2 (two) times daily.    SEMAGLUTIDE (OZEMPIC) 1 MG/DOSE (4 MG/3 ML)    Inject 1 mg into the skin every 7 days.    SENNA-DOCUSATE 8.6-50 MG (PERICOLACE) 8.6-50 MG PER TABLET    Take 1 tablet by mouth 2 (two) times daily.    VYVANSE 40 MG CAP    Take 40 mg by mouth once daily.   Modified Medications    No medications on file   Discontinued Medications    No medications on file       Patient Active Problem List    Diagnosis Date Noted    Open wound of left foot 09/08/2022    Charcot's joint of left foot     Left midfoot ulcer, with necrosis of muscle     Iron deficiency anemia 05/06/2022    Cellulitis of left foot 05/06/2022    PAD (peripheral artery disease) 05/06/2022    Osteomyelitis of right foot     Hyponatremia 05/04/2022    Lymphadenopathy, inguinal 04/21/2022    Chronic deep vein thrombosis (DVT) of both lower extremities 04/13/2022    Diabetic foot ulcer associated with type 2 diabetes mellitus 04/13/2022    Insomnia 02/10/2022     difficulty with sleep initiation and maintainence.  Not an issue when using cpap.        SHAWN (obstructive sleep apnea) 02/09/2022     -diagnosed many years ago.  Lost pap device from evacuation.  Record of prior sleep study not available.  Will set up with hsat.  Possible apap after sleep study      Psychosis 02/14/2021    Diabetic ulcer of left midfoot associated with type 2 diabetes mellitus, limited to breakdown of skin 02/11/2021    Acute exacerbation of psychosis 02/10/2021    History of diabetic ulcer of foot 12/23/2020    Fatty liver 11/12/2020    Hepatomegaly 11/12/2020    History of bariatric surgery 11/12/2020    Need for prophylactic vaccination against hepatitis A and hepatitis B 11/12/2020    Liver fibrosis 11/12/2020    Decreased ROM of ankle 06/04/2020    Decreased strength of lower extremity 06/04/2020    Balance problem 06/04/2020    Gait abnormality 06/04/2020     History of pulmonary embolism 09/25/2019    DVT, recurrent, lower extremity, chronic, left 09/25/2019    Long term (current) use of anticoagulants 09/03/2019    Nuclear sclerosis of both eyes 08/16/2018    Bilateral ocular hypertension 08/16/2018    Refractive error 08/16/2018    Other chronic pain     Type 2 diabetes mellitus 09/26/2016     Overview:   dx update      Class 2 severe obesity with serious comorbidity and body mass index (BMI) of 39.0 to 39.9 in adult 08/10/2016    Cellulitis of left lower extremity 07/16/2016    Thrombocytosis 07/16/2016    Gastroparesis due to DM 12/31/2015    Incisional hernia without mention of obstruction or gangrene 04/13/2015     Dx updated per 2019 IMO Load      DM type 2 without retinopathy 04/13/2015    History of DVT (deep vein thrombosis) 04/13/2015    History of pulmonary embolus (PE) 04/13/2015    Bipolar 1 disorder 04/13/2015    Leg swelling 01/18/2015     history of bilateral dvt      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 01/13/2015    Diabetic neuropathy 11/28/2014     Overview:   dx update      Tobacco abuse 03/06/2014    Mild protein malnutrition 03/06/2014    Hyperlipidemia 02/13/2014     Overview:   dx update    Formatting of this note might be different from the original.  dx update      COPD (chronic obstructive pulmonary disease) 10/11/2013    Type II diabetes mellitus with neurological manifestations 06/06/2013    Essential hypertension 06/06/2013       Review of Systems   Review of Systems   Constitutional:  Negative for chills, fever and malaise/fatigue.   HENT:  Negative for sinus pain and sore throat.    Respiratory:  Negative for cough, sputum production and shortness of breath.    Cardiovascular:  Negative for chest pain and leg swelling.   Gastrointestinal:  Negative for nausea and vomiting.   Genitourinary:  Negative for dysuria and frequency.   Musculoskeletal:  Positive for myalgias (right foot pain).   Neurological:  Negative for  "dizziness and headaches.         Objective:      /67   Pulse 92   Ht 5' 6" (1.676 m)   Wt 106.6 kg (235 lb)   LMP 11/01/2011 (LMP Unknown) Comment: stated when she was 39  SpO2 (!) 93%   BMI 37.93 kg/m²   Estimated body mass index is 37.93 kg/m² as calculated from the following:    Height as of this encounter: 5' 6" (1.676 m).    Weight as of this encounter: 106.6 kg (235 lb).    Physical Exam  Vitals reviewed.   Constitutional:       General: She is not in acute distress.     Appearance: She is well-developed.   HENT:      Head: Normocephalic and atraumatic.   Eyes:      Conjunctiva/sclera: Conjunctivae normal.      Pupils: Pupils are equal, round, and reactive to light.   Cardiovascular:      Rate and Rhythm: Normal rate and regular rhythm.      Heart sounds: Normal heart sounds.   Pulmonary:      Effort: Pulmonary effort is normal. No respiratory distress.      Breath sounds: Normal breath sounds.   Abdominal:      General: Bowel sounds are normal. There is no distension.      Palpations: Abdomen is soft.   Musculoskeletal:         General: Normal range of motion.      Cervical back: Normal range of motion and neck supple.      Right lower leg: Edema present.      Left lower leg: Edema present.      Comments: LLE mild erythema present on anterior aspect  B/L LE with pitting edema  LLE midfoot with large wound- pink healthy granulation tissue. Serous drainage on dressings. No purulence or malodor.  See image     Skin:     General: Skin is warm and dry.   Neurological:      General: No focal deficit present.      Mental Status: She is alert and oriented to person, place, and time.      Cranial Nerves: No cranial nerve deficit.   Psychiatric:         Mood and Affect: Mood normal.         Behavior: Behavior normal.             Assessment:         1. Diabetic mononeuropathy associated with diabetes mellitus due to underlying condition        2. Cellulitis of left foot        3. Diabetic ulcer of left " midfoot associated with type 2 diabetes mellitus, limited to breakdown of skin              Plan:       Audrey was seen today for follow-up.    Diagnoses and all orders for this visit:    Diabetic mononeuropathy associated with diabetes mellitus due to underlying condition  --tight glucose control to ensure optimal wound healing       Cellulitis of left foot  Diabetic ulcer of left midfoot associated with type 2 diabetes mellitus, limited to breakdown of skin  53y/o M with h/o DM with charcot foot, DVT on Eliquis, PAD, ongoing tobacco abuse admitted 1/9 with worsening L leg cellulitis and wound infection due to MRSA s/p I+D 1/13 (bone path neg for OM) who presents for hospital follow-up. She is s/p 4 wks IV vancomycin with improvement in LLE infection. Last day IV abx 2/7. Notes erythema has drastically improved (still mildly erythematous), midfoot wound is healing and she is overall feeling better. Denies signs or symptoms of systemic infection.     Plan:  --continue to monitor off antibiotics  --counseled on signs and symptoms of worsening or recurrent infection and to notify our clinic if these were to arise.  --plan for wound vac delivery/ placement today  --continue local wound care  --f/u with podiatry   --f/u afb and fungal cultures    Nida Salazar MD  Infectious Disease     Total professional time spent for the encounter: 45 minutes  Time was spent preparing to see the patient, reviewing results of prior testing, obtaining and/or reviewing separately obtained history, performing a medically appropriate examination and interview, counseling and educating the patient/family, ordering medications/tests/procedures, referring and communicating with other health care professionals, documenting clinical information in the electronic health record, and independently interpreting results.

## 2023-02-08 ENCOUNTER — OFFICE VISIT (OUTPATIENT)
Dept: INFECTIOUS DISEASES | Facility: CLINIC | Age: 53
End: 2023-02-08
Payer: MEDICAID

## 2023-02-08 VITALS
SYSTOLIC BLOOD PRESSURE: 108 MMHG | WEIGHT: 235 LBS | OXYGEN SATURATION: 93 % | HEART RATE: 92 BPM | HEIGHT: 66 IN | DIASTOLIC BLOOD PRESSURE: 67 MMHG | BODY MASS INDEX: 37.77 KG/M2

## 2023-02-08 DIAGNOSIS — E08.41 DIABETIC MONONEUROPATHY ASSOCIATED WITH DIABETES MELLITUS DUE TO UNDERLYING CONDITION: Primary | ICD-10-CM

## 2023-02-08 DIAGNOSIS — E11.621 DIABETIC ULCER OF LEFT MIDFOOT ASSOCIATED WITH TYPE 2 DIABETES MELLITUS, LIMITED TO BREAKDOWN OF SKIN: ICD-10-CM

## 2023-02-08 DIAGNOSIS — L03.116 CELLULITIS OF LEFT FOOT: ICD-10-CM

## 2023-02-08 DIAGNOSIS — L97.421 DIABETIC ULCER OF LEFT MIDFOOT ASSOCIATED WITH TYPE 2 DIABETES MELLITUS, LIMITED TO BREAKDOWN OF SKIN: ICD-10-CM

## 2023-02-08 PROCEDURE — 99215 OFFICE O/P EST HI 40 MIN: CPT | Mod: PBBFAC

## 2023-02-08 PROCEDURE — 1111F DSCHRG MED/CURRENT MED MERGE: CPT | Mod: CPTII,,, | Performed by: INTERNAL MEDICINE

## 2023-02-08 PROCEDURE — 4010F ACE/ARB THERAPY RXD/TAKEN: CPT | Mod: CPTII,,, | Performed by: INTERNAL MEDICINE

## 2023-02-08 PROCEDURE — 3074F SYST BP LT 130 MM HG: CPT | Mod: CPTII,,, | Performed by: INTERNAL MEDICINE

## 2023-02-08 PROCEDURE — 3078F PR MOST RECENT DIASTOLIC BLOOD PRESSURE < 80 MM HG: ICD-10-PCS | Mod: CPTII,,, | Performed by: INTERNAL MEDICINE

## 2023-02-08 PROCEDURE — 1111F PR DISCHARGE MEDS RECONCILED W/ CURRENT OUTPATIENT MED LIST: ICD-10-PCS | Mod: CPTII,,, | Performed by: INTERNAL MEDICINE

## 2023-02-08 PROCEDURE — 1159F MED LIST DOCD IN RCRD: CPT | Mod: CPTII,,, | Performed by: INTERNAL MEDICINE

## 2023-02-08 PROCEDURE — 99999 PR PBB SHADOW E&M-EST. PATIENT-LVL V: ICD-10-PCS | Mod: PBBFAC,,,

## 2023-02-08 PROCEDURE — 3008F BODY MASS INDEX DOCD: CPT | Mod: CPTII,,, | Performed by: INTERNAL MEDICINE

## 2023-02-08 PROCEDURE — 99215 OFFICE O/P EST HI 40 MIN: CPT | Mod: S$PBB,,, | Performed by: INTERNAL MEDICINE

## 2023-02-08 PROCEDURE — 3074F PR MOST RECENT SYSTOLIC BLOOD PRESSURE < 130 MM HG: ICD-10-PCS | Mod: CPTII,,, | Performed by: INTERNAL MEDICINE

## 2023-02-08 PROCEDURE — 3008F PR BODY MASS INDEX (BMI) DOCUMENTED: ICD-10-PCS | Mod: CPTII,,, | Performed by: INTERNAL MEDICINE

## 2023-02-08 PROCEDURE — 1159F PR MEDICATION LIST DOCUMENTED IN MEDICAL RECORD: ICD-10-PCS | Mod: CPTII,,, | Performed by: INTERNAL MEDICINE

## 2023-02-08 PROCEDURE — 99215 PR OFFICE/OUTPT VISIT, EST, LEVL V, 40-54 MIN: ICD-10-PCS | Mod: S$PBB,,, | Performed by: INTERNAL MEDICINE

## 2023-02-08 PROCEDURE — 4010F PR ACE/ARB THEARPY RXD/TAKEN: ICD-10-PCS | Mod: CPTII,,, | Performed by: INTERNAL MEDICINE

## 2023-02-08 PROCEDURE — 99999 PR PBB SHADOW E&M-EST. PATIENT-LVL V: CPT | Mod: PBBFAC,,,

## 2023-02-08 PROCEDURE — 3078F DIAST BP <80 MM HG: CPT | Mod: CPTII,,, | Performed by: INTERNAL MEDICINE

## 2023-02-11 LAB
ACID FAST MOD KINY STN SPEC: NORMAL
MYCOBACTERIUM SPEC QL CULT: NORMAL

## 2023-02-13 LAB — FUNGUS SPEC CULT: NORMAL

## 2023-02-22 ENCOUNTER — OFFICE VISIT (OUTPATIENT)
Dept: INFECTIOUS DISEASES | Facility: CLINIC | Age: 53
DRG: 623 | End: 2023-02-22
Payer: MEDICAID

## 2023-02-22 VITALS
BODY MASS INDEX: 37.77 KG/M2 | HEIGHT: 66 IN | HEART RATE: 96 BPM | OXYGEN SATURATION: 97 % | WEIGHT: 235 LBS | SYSTOLIC BLOOD PRESSURE: 99 MMHG | DIASTOLIC BLOOD PRESSURE: 66 MMHG

## 2023-02-22 DIAGNOSIS — M79.89 LEFT LEG SWELLING: Primary | ICD-10-CM

## 2023-02-22 PROCEDURE — 3008F BODY MASS INDEX DOCD: CPT | Mod: CPTII,,, | Performed by: INTERNAL MEDICINE

## 2023-02-22 PROCEDURE — 4010F ACE/ARB THERAPY RXD/TAKEN: CPT | Mod: CPTII,,, | Performed by: INTERNAL MEDICINE

## 2023-02-22 PROCEDURE — 99214 PR OFFICE/OUTPT VISIT, EST, LEVL IV, 30-39 MIN: ICD-10-PCS | Mod: S$PBB,,, | Performed by: INTERNAL MEDICINE

## 2023-02-22 PROCEDURE — 3008F PR BODY MASS INDEX (BMI) DOCUMENTED: ICD-10-PCS | Mod: CPTII,,, | Performed by: INTERNAL MEDICINE

## 2023-02-22 PROCEDURE — 99214 OFFICE O/P EST MOD 30 MIN: CPT | Mod: S$PBB,,, | Performed by: INTERNAL MEDICINE

## 2023-02-22 PROCEDURE — 4010F PR ACE/ARB THEARPY RXD/TAKEN: ICD-10-PCS | Mod: CPTII,,, | Performed by: INTERNAL MEDICINE

## 2023-02-22 PROCEDURE — 3074F SYST BP LT 130 MM HG: CPT | Mod: CPTII,,, | Performed by: INTERNAL MEDICINE

## 2023-02-22 PROCEDURE — 3078F PR MOST RECENT DIASTOLIC BLOOD PRESSURE < 80 MM HG: ICD-10-PCS | Mod: CPTII,,, | Performed by: INTERNAL MEDICINE

## 2023-02-22 PROCEDURE — 99215 OFFICE O/P EST HI 40 MIN: CPT | Mod: PBBFAC

## 2023-02-22 PROCEDURE — 3074F PR MOST RECENT SYSTOLIC BLOOD PRESSURE < 130 MM HG: ICD-10-PCS | Mod: CPTII,,, | Performed by: INTERNAL MEDICINE

## 2023-02-22 PROCEDURE — 99999 PR PBB SHADOW E&M-EST. PATIENT-LVL V: ICD-10-PCS | Mod: PBBFAC,,,

## 2023-02-22 PROCEDURE — 99999 PR PBB SHADOW E&M-EST. PATIENT-LVL V: CPT | Mod: PBBFAC,,,

## 2023-02-22 PROCEDURE — 3078F DIAST BP <80 MM HG: CPT | Mod: CPTII,,, | Performed by: INTERNAL MEDICINE

## 2023-02-22 NOTE — PROGRESS NOTES
INFECTIOUS DISEASE CLINIC  2/22/23     Subjective:      Chief Complaint:   Chief Complaint   Patient presents with    Follow-up       History of Present Illness:    Patient Audrey Natarajan is a 52 y.o. female who presents today for follow up of foot infection. Last saw Dr Tuttle 2/7 after completing 4 weeks of iv vancomycin at [facility] and abx have been stopped since then. Reports recent worsening of chronic L leg swelling and pain. Denies worsening of foot wound. Denies f/c. Says Wound care going to house. Reluctant to amputation. Still smoking. Says she's using scooter to offload pressure on L foot.       Review of Symptoms:  Constitutional: Denies fevers, chills, or weakness.  ENT: Denies dysphagia, nasal discharge, ear pain or discharge.  Cardiovascular: Denies chest pain, palpitations, orthopnea, or claudication.  Respiratory: Denies shortness of breath, cough, hemoptysis, or wheezing.  GI: Denies nausea/vomitting, hematochezia, melena, abd pain, or changes in appetite.  : Denies dysuria, incontinence, or hematuria.  Musculoskeletal: Denies joint pain or myalgias.  Skin/breast: Denies rashes, lumps, lesions, or discharge.  Neurologic: Denies headache, dizziness, vertigo, or paresthesias.    Past Medical History:   Diagnosis Date    ADHD (attention deficit hyperactivity disorder)     Arthritis     Asthma     Bipolar 1 disorder     Cataract     Cigarette smoker     COPD (chronic obstructive pulmonary disease)     Coronary artery disease     A fib    Depression     bipolar manic depresson    Diabetes mellitus     Diabetic foot ulcers     Diabetic neuropathy     DVT of lower extremity, bilateral 07/2013    bilateral LE DVT. Manly filter placed.     Encounter for blood transfusion     History of blood clots 1. Left Leg=2003; 2.Bilateral Groin=Blood Clots= 5 or 6/ 2013 & 7/2013; 3. LLL of Lung=7/2013;  4. Lt. Lower Leg=7/2013.     Pt. had 1st Blood Clot after [id number], & Last=2013. Estelita Filter=  "Rt.Lateral Neck.    HTN (hypertension) 2013    Pt states that she does not have hypertension    Hypercholesteremia     Irregular heartbeat     Neuromuscular disorder     neuropathy feet    Obese     PE (pulmonary embolism) 2013    bilat LE DVT.     Restless leg syndrome        Past Surgical History:   Procedure Laterality Date    ABDOMINAL SURGERY      gastric sleeve    BILATERAL OOPHORECTOMY Bilateral 2015    CHOLECYSTECTOMY      DEBRIDEMENT OF FOOT Bilateral 5/10/2022    Procedure: DEBRIDEMENT, FOOT;  Surgeon: Maira De Los Santos DPM;  Location: [facility] OR;  Service: Podiatry;  Laterality: Bilateral;    Green' s filter Right 2012    Right Neck & Tunneled Down.    HERNIA REPAIR      "Oran of Hernias Repaires around th [place]ly Button.", pt. states    INCISION AND DRAINAGE FOOT Left 2022    Procedure: INCISION AND DRAINAGE, FOOT;  Surgeon: Fahad Razo DPM;  Location: [facility] OR;  Service: Podiatry;  Laterality: Left;    LAPAROSCOPIC CHOLECYSTECTOMY N/A 9/10/2020    Procedure: CHOLECYSTECTOMY, LAPAROSCOPIC;  Surgeon: Montrell Gutierrez MD;  Location: [facility] OR;  Service: General;  Laterality: N/A;  RN PREOP ----COVID Negative      OVARIAN CYST REMOVAL  3/13/2014    IA REMOVAL OF OVARY/TUBE(S)      SPLENECTOMY, TOTAL  2003    TONSILLECTOMY      as a child    TYMPANOSTOMY TUBE PLACEMENT      VEIN SURGERY      Lt leg       Family History   Problem Relation Age of Onset    Hypertension Father     Diabetes Father     Heart disease Father     Cataracts Father     Diabetes Paternal Grandfather     Heart disease Paternal Grandfather     No Known Problems Mother     Ovarian cancer Maternal Grandmother          from this. ? age     No Known Problems Sister     No Known Problems Brother     No Known Problems Maternal Aunt     No Known Problems Maternal Uncle     No Known Problems Paternal Aunt     No Known Problems Paternal Uncle     No Known Problems Maternal Grandfather     Ovarian cancer " Paternal Grandmother     Uterine cancer Neg Hx     Breast cancer Neg Hx     Colon cancer Neg Hx     Amblyopia Neg Hx     Blindness Neg Hx     Cancer Neg Hx     Glaucoma Neg Hx     Macular degeneration Neg Hx     Retinal detachment Neg Hx     Strabismus Neg Hx     Stroke Neg Hx     Thyroid disease Neg Hx        Social History     Socioeconomic History    Marital status: Significant Other   Tobacco Use    Smoking status: Every Day     Packs/day: 1.00     Years: 37.00     Pack years: 37.00     Types: Cigarettes     Last attempt to quit: 2020     Years since quittin.2    Smokeless tobacco: Current    Tobacco comments:     Enrolled in the [employer] on 5/3/14 ([facility] Member ID # [id number]). Ambulatory referral to Smoking Cessation Program   Substance and Sexual Activity    Alcohol use: No     Alcohol/week: 0.0 standard drinks    Drug use: No    Sexual activity: Yes     Partners: Male   Social History Narrative     from her  of 20 years    Lives by herself since 2019     Social Determinants of Health     Financial Resource Strain: Unknown    Difficulty of Paying Living Expenses: Patient refused   Food Insecurity: Unknown    Worried About Running Out of Food in the Last Year: Patient refused    Ran Out of Food in the Last Year: Patient refused   Transportation Needs: Unknown    Lack of Transportation (Medical): Patient refused    Lack of Transportation (Non-Medical): Patient refused   Physical Activity: Unknown    Days of Exercise per Week: Patient refused    Minutes of Exercise per Session: Patient refused   Stress: Unknown    Feeling of Stress : Patient refused   Social Connections: Unknown    Frequency of Communication with Friends and Family: Patient refused    Frequency of Social Gatherings with Friends and Family: Patient refused    Attends Voodoo Services: Patient refused    Active Member of Clubs or Organizations: Patient refused    Attends Club or Organization Meetings: Patient  refused    Marital Status: Patient refused   Housing Stability: Unknown    Unable to Pay for Housing in the Last Year: Patient refused    Unstable Housing in the Last Year: Patient refused       Review of patient's allergies indicates:   Allergen Reactions    Morphine Other (See Comments)     Patient had a psychotic episode after taking Morphine  Agitation, hallucinations    Penicillins Anaphylaxis     Tolerated cephalosporins in the past    Januvia [sitagliptin] Hives    Carbamazepine Other (See Comments)     hyponatremia         Objective:   VS (24h):   Vitals:    02/22/23 1045   BP: 99/66   Pulse: 96     [unfilled]  General: Afebrile, alert, comfortable, no acute distress.   HEENT: RAOUL. EOMI, no scleral icterus.   Pulmonary: Non labored,clear to auscultation A/P/L. No wheezing, crackles, or rhonchi.  Cardiac: normal S1 & S2 w/o rubs/murmurs/gallops. No JVD.   Abdominal: Non-tender, non-distended.Bowel sounds present x 4.   Extremities: Moves all extremities x 4. L>R leg swelling  Skin: L foot with charcot foot with large skin defect on bottom surface. Minimal non-purulent drainage. No bone exposed  Neurological:  Alert and oriented x 4.     Labs:    Glucose   Date Value Ref Range Status   01/16/2023 221 (H) 70 - 110 mg/dL Final   01/12/2023 148 (H) 70 - 110 mg/dL Final   01/11/2023 211 (H) 70 - 110 mg/dL Final       Calcium   Date Value Ref Range Status   01/16/2023 8.9 8.7 - 10.5 mg/dL Final   01/12/2023 9.4 8.7 - 10.5 mg/dL Final   01/11/2023 9.3 8.7 - 10.5 mg/dL Final       Albumin   Date Value Ref Range Status   01/09/2023 3.1 (L) 3.5 - 5.2 g/dL Final   12/23/2022 3.0 (L) 3.5 - 5.2 g/dL Final   12/22/2022 3.1 (L) 3.5 - 5.2 g/dL Final       Total Protein   Date Value Ref Range Status   01/09/2023 7.3 6.0 - 8.4 g/dL Final   12/23/2022 6.6 6.0 - 8.4 g/dL Final   12/22/2022 7.0 6.0 - 8.4 g/dL Final       Sodium   Date Value Ref Range Status   01/16/2023 134 (L) 136 - 145 mmol/L Final   01/12/2023 133 (L) 136  - 145 mmol/L Final   01/11/2023 136 136 - 145 mmol/L Final       Potassium   Date Value Ref Range Status   01/16/2023 4.8 3.5 - 5.1 mmol/L Final   01/12/2023 4.7 3.5 - 5.1 mmol/L Final   01/11/2023 5.0 3.5 - 5.1 mmol/L Final       CO2   Date Value Ref Range Status   01/16/2023 31 (H) 23 - 29 mmol/L Final   01/12/2023 27 23 - 29 mmol/L Final   01/11/2023 26 23 - 29 mmol/L Final       Chloride   Date Value Ref Range Status   01/16/2023 96 95 - 110 mmol/L Final   01/12/2023 98 95 - 110 mmol/L Final   01/11/2023 100 95 - 110 mmol/L Final       BUN   Date Value Ref Range Status   01/16/2023 14 6 - 20 mg/dL Final   01/12/2023 11 6 - 20 mg/dL Final   01/11/2023 14 6 - 20 mg/dL Final       Creatinine   Date Value Ref Range Status   01/16/2023 0.6 0.5 - 1.4 mg/dL Final   01/12/2023 0.6 0.5 - 1.4 mg/dL Final   01/11/2023 0.7 0.5 - 1.4 mg/dL Final       Alkaline Phosphatase   Date Value Ref Range Status   01/09/2023 75 55 - 135 U/L Final   12/23/2022 84 55 - 135 U/L Final   12/22/2022 88 55 - 135 U/L Final       ALT   Date Value Ref Range Status   01/09/2023 6 (L) 10 - 44 U/L Final   12/23/2022 7 (L) 10 - 44 U/L Final   12/22/2022 7 (L) 10 - 44 U/L Final       AST   Date Value Ref Range Status   01/09/2023 14 10 - 40 U/L Final   12/23/2022 8 (L) 10 - 40 U/L Final   12/22/2022 6 (L) 10 - 40 U/L Final       Total Bilirubin   Date Value Ref Range Status   01/09/2023 0.2 0.1 - 1.0 mg/dL Final     Comment:     For infants and newborns, interpretation of results should be based  on gestational age, weight and in agreement with clinical  observations.    Premature Infant recommended reference ranges:  Up to 24 hours.............<8.0 mg/dL  Up to 48 hours............<12.0 mg/dL  3-5 days..................<15.0 mg/dL  6-29 days.................<15.0 mg/dL     12/23/2022 0.3 0.1 - 1.0 mg/dL Final     Comment:     For infants and newborns, interpretation of results should be based  on gestational age, weight and in agreement with  clinical  observations.    Premature Infant recommended reference ranges:  Up to 24 hours.............<8.0 mg/dL  Up to 48 hours............<12.0 mg/dL  3-5 days..................<15.0 mg/dL  6-29 days.................<15.0 mg/dL     12/22/2022 0.3 0.1 - 1.0 mg/dL Final     Comment:     For infants and newborns, interpretation of results should be based  on gestational age, weight and in agreement with clinical  observations.    Premature Infant recommended reference ranges:  Up to 24 hours.............<8.0 mg/dL  Up to 48 hours............<12.0 mg/dL  3-5 days..................<15.0 mg/dL  6-29 days.................<15.0 mg/dL         WBC   Date Value Ref Range Status   01/16/2023 12.54 3.90 - 12.70 K/uL Final   01/12/2023 9.77 3.90 - 12.70 K/uL Final   01/11/2023 10.52 3.90 - 12.70 K/uL Final       Hemoglobin   Date Value Ref Range Status   01/16/2023 9.9 (L) 12.0 - 16.0 g/dL Final   01/12/2023 10.1 (L) 12.0 - 16.0 g/dL Final   01/11/2023 10.3 (L) 12.0 - 16.0 g/dL Final       POC Hematocrit   Date Value Ref Range Status   04/21/2022 32 (L) 36 - 54 %PCV Final   02/10/2021 37 36 - 54 %PCV Final   01/12/2015 44 36 - 54 %PCV Final     Hematocrit   Date Value Ref Range Status   01/16/2023 31.6 (L) 37.0 - 48.5 % Final   01/12/2023 31.8 (L) 37.0 - 48.5 % Final   01/11/2023 32.1 (L) 37.0 - 48.5 % Final       MCV   Date Value Ref Range Status   01/16/2023 79 (L) 82 - 98 fL Final   01/12/2023 79 (L) 82 - 98 fL Final   01/11/2023 78 (L) 82 - 98 fL Final       Platelets   Date Value Ref Range Status   01/16/2023 559 (H) 150 - 450 K/uL Final     Comment:     Platelets are clumped on smear.Platelet count may be affected.   01/12/2023 744 (H) 150 - 450 K/uL Final   01/11/2023 832 (H) 150 - 450 K/uL Final       Lab Results   Component Value Date    CHOL 109 04/19/2021    CHOL 185 06/16/2020    CHOL 250 (H) 01/30/2019       Lab Results   Component Value Date    HDL 46 04/19/2021    HDL 49 06/16/2020    HDL 50 01/30/2019       Lab  Results   Component Value Date    LDLCALC 43 04/19/2021    LDLCALC 83.2 06/16/2020    LDLCALC 144.4 01/30/2019       Lab Results   Component Value Date    TRIG 98 04/19/2021    TRIG 264 (H) 06/16/2020    TRIG 278 (H) 01/30/2019       Lab Results   Component Value Date    CHOLHDL 2.37 04/19/2021    CHOLHDL 26.5 06/16/2020    CHOLHDL 20.0 01/30/2019       RPR   Date Value Ref Range Status   02/14/2021 Non-reactive Non-reactive Final   10/01/2020 Non-reactive Non-reactive Final     No results found for: QUANTIFERON    Medications:  Current Outpatient Medications on File Prior to Visit   Medication Sig Dispense Refill    acetaminophen (TYLENOL) 500 MG tablet Take 2 tablets (1,000 mg total) by mouth every 6 (six) hours as needed for Pain. 30 tablet 0    albuterol (PROVENTIL/VENTOLIN HFA) 90 mcg/actuation inhaler INHALE 2 PUFFS INTO THE LUNGS EVERY 6 HOURS AS NEEDED FOR WHEEZING. RESCUE 6.7 g 2    albuterol-ipratropium (DUO-NEB) 2.5 mg-0.5 mg/3 mL nebulizer solution Take 3 mLs by nebulization every 6 (six) hours as needed for Wheezing or Shortness of Breath. Rescue 1 each 0    ammonium lactate (LAC-HYDRIN) 12 % lotion APPL Y ONCE TOPICALLY TWICE DAILY FOR 30 DAYS 225 g 0    apixaban (ELIQUIS) 5 mg Tab Take 1 tablet (5 mg total) by mouth 2 (two) times daily. 60 tablet 0    aspirin 81 MG Chew Take 1 tablet (81 mg total) by mouth once daily. 30 tablet 0    bumetanide (BUMEX) 1 MG tablet Take 1 tablet (1 mg total) by mouth once daily. 30 tablet 5    divalproex (DEPAKOTE) 500 MG TbEC Take 1 tablet (500 mg total) by mouth once daily. PO QAM 30 tablet 11    DUPIXENT  mg/2 mL PnIj Inject into the skin.      ferrous sulfate 325 (65 FE) MG EC tablet Take 1 tablet (325 mg total) by mouth once daily. 30 tablet 0    fluticasone propionate (FLONASE) 50 mcg/actuation nasal spray 2 sprays (100 mcg total) by Each Nostril route daily as needed (Nasal congestion). 9.9 mL 0    fluticasone-salmeterol diskus inhaler 250-50 mcg Inhale 1  puff into the lungs 2 (two) times daily. Controller 60 each 0    hydrOXYzine (ATARAX) 50 MG tablet Take 0.5 tablets (25 mg total) by mouth 4 (four) times daily as needed for Itching or Anxiety.      insulin detemir U-100 (LEVEMIR FLEXTOUCH) 100 unit/mL (3 mL) SubQ InPn pen Inject 22 Units into the skin every evening. 6.6 mL 0    LIDOcaine (LIDODERM) 5 % Place 1 patch onto the skin once daily. Remove & Discard patch within 12 hours or as directed by MD  0    lisinopriL 10 MG tablet Take 1 tablet (10 mg total) by mouth once daily. 30 tablet 0    loratadine (CLARITIN) 10 mg tablet Take 1 tablet (10 mg total) by mouth once daily. 90 tablet 3    magnesium hydroxide 400 mg/5 ml (MILK OF MAGNESIA) 400 mg/5 mL Susp Take 30 mLs (2,400 mg total) by mouth daily as needed.      metFORMIN (GLUCOPHAGE) 1000 MG tablet Take 1 tablet (1,000 mg total) by mouth 2 (two) times daily with meals. 180 tablet 1    methocarbamoL (ROBAXIN) 500 MG Tab Take 500 mg by mouth. Frequency could not be confirmed.      metoprolol tartrate (LOPRESSOR) 25 MG tablet Take 1 tablet (25 mg total) by mouth 2 (two) times daily. 60 tablet 0    multivitamin Tab Take 1 tablet by mouth once daily. 30 tablet 2    nystatin (NYSTOP) powder APPLY TO ABDOMINAL AND BREAST SKIN FOLD TWICE DAILY. 60 g 0    oxyCODONE-acetaminophen (PERCOCET)  mg per tablet Take 1 tablet by mouth every 6 (six) hours as needed for Pain. 16 tablet 0    pantoprazole (PROTONIX) 40 MG tablet Take 1 tablet (40 mg total) by mouth once daily. 30 tablet 0    polyethylene glycol (GLYCOLAX) 17 gram PwPk Take 17 g by mouth once daily. 30 each 0    pravastatin (PRAVACHOL) 40 MG tablet Take 1 tablet (40 mg total) by mouth every evening. 30 tablet 0    risperiDONE (RISPERDAL M-TABS) 3 MG disintegrating tablet Take 1 tablet (3 mg total) by mouth 2 (two) times daily. 60 tablet 0    semaglutide (OZEMPIC) 1 mg/dose (4 mg/3 mL) Inject 1 mg into the skin every 7 days. 3 pen 1    senna-docusate 8.6-50 mg  (PERICOLACE) 8.6-50 mg per tablet Take 1 tablet by mouth 2 (two) times daily.      VYVANSE 40 mg Cap Take 40 mg by mouth once daily.      [DISCONTINUED] diclofenac sodium (VOLTAREN) 1 % Gel Apply 2 g topically 4 (four) times daily as needed (Apply to painful area up to 4 times a day as needed for pain). Apply to painful area 4 times a day as needed for pain 1 Tube 0    [DISCONTINUED] furosemide (LASIX) 20 MG tablet TAKE 1 TABLET(20 MG) BY MOUTH EVERY DAY 90 tablet 1    [DISCONTINUED] QUEtiapine (SEROQUEL) 200 MG Tab Take 1 tablet (200 mg total) by mouth before breakfast. 30 tablet 2     No current facility-administered medications on file prior to visit.       Antibiotics:   Antibiotics (From admission, onward)      None            HIV: No components found for: HIV 1/2 AG/AB  Hepatitis C IgG: No components found for: HEPATITIS C  Syphilis:   RPR   Date Value Ref Range Status   02/14/2021 Non-reactive Non-reactive Final   10/01/2020 Non-reactive Non-reactive Final       Hepatitis A IgG: No components found for: HEPATITIS A IGG  Hepatitis Bc IgG: No components found for: HEPATITIS B CORE IGG  Hepatitis Bs IgG:  Quantiferon: No results found for: QUANTIFERON  VZV IgG: No components found for: VARICELLA IGG    No components found for: SEDIMENTATION RATE  No components found for: C-REACTIVE PROTEIN      Microbiology x 7d:   Microbiology Results (last 7 days)       ** No results found for the last 168 hours. **            Immunization History   Administered Date(s) Administered    Hepatitis B, Adult 01/09/2014, 02/13/2014    Influenza 01/14/2015    Influenza - Quadrivalent 09/18/2015    Influenza - Quadrivalent - PF *Preferred* (6 months and older) 11/18/2016, 10/24/2017, 10/31/2018, 09/04/2019, 09/29/2020, 02/03/2022, 09/27/2022    Influenza - Trivalent - PF (ADULT) 01/14/2015    Pneumococcal Polysaccharide - 23 Valent 10/24/2017    Tdap 01/09/2014         Reviewed records today as well as relevant labs, cultures, and  imaging    Assessment:     Charcot foot  Tobacco abuse  Dm  Pad  Dvt on eliquis    No toxicities from antimicrobials  Extremely medically complex  Patient is high risk for infectious complications given medical comorbidities    Not clear that infection is playing role in chronic foot ulcer. Likely poor wound healing from combination from  DM, ongoing tobacco abuse, PAD and psychosocial factors. Note prior bone biopsy have been negative for OM and she just completed prolonged iv vancomycin    Plan:     R/o DVT LLE  Continue wound care      - Will monitor drug therapy for toxicity      - The following labs were ordered:  Orders Placed This Encounter   Procedures    CV Ultrasound doppler venous DVT leg left     Standing Status:   Future     Standing Expiration Date:   2/22/2024     Order Specific Question:   Release to patient     Answer:   Immediate           I have sent communication to the referring physician and/or primary care provider.     The total time for evaluation and management services performed on 2/22/23 was greater than 30 minutes. This includes face to face time and non-face to face time preparing to see the patient (eg, review of tests), obtaining and/or reviewing separately obtained history, documenting clinical information in the electronic or other health record, independently interpreting results, and communicating results to the patient/family/caregiver, or care coordination.             Beverly Zavala MD, MPH  Infectious Disease

## 2023-02-23 ENCOUNTER — HOSPITAL ENCOUNTER (INPATIENT)
Facility: HOSPITAL | Age: 53
LOS: 8 days | Discharge: HOME HEALTH | DRG: 623 | End: 2023-03-03
Attending: EMERGENCY MEDICINE | Admitting: EMERGENCY MEDICINE
Payer: MEDICAID

## 2023-02-23 DIAGNOSIS — L03.116 CELLULITIS OF LEFT LEG: ICD-10-CM

## 2023-02-23 DIAGNOSIS — Z79.2 ANTIBIOTIC LONG-TERM USE: ICD-10-CM

## 2023-02-23 DIAGNOSIS — L97.529 DIABETIC ULCER OF LEFT FOOT: ICD-10-CM

## 2023-02-23 DIAGNOSIS — M79.605 LEFT LEG PAIN: ICD-10-CM

## 2023-02-23 DIAGNOSIS — M79.89 LEFT LEG SWELLING: ICD-10-CM

## 2023-02-23 DIAGNOSIS — M86.9 OSTEOMYELITIS OF LEFT FOOT: ICD-10-CM

## 2023-02-23 DIAGNOSIS — E11.621 DIABETIC ULCER OF LEFT FOOT: ICD-10-CM

## 2023-02-23 DIAGNOSIS — R07.9 CHEST PAIN: ICD-10-CM

## 2023-02-23 DIAGNOSIS — E11.621 DIABETIC ULCER OF LEFT MIDFOOT ASSOCIATED WITH TYPE 2 DIABETES MELLITUS, WITH FAT LAYER EXPOSED: Primary | ICD-10-CM

## 2023-02-23 DIAGNOSIS — M86.179 OTHER ACUTE OSTEOMYELITIS, UNSPECIFIED ANKLE AND FOOT: ICD-10-CM

## 2023-02-23 DIAGNOSIS — R65.10 SIRS (SYSTEMIC INFLAMMATORY RESPONSE SYNDROME): ICD-10-CM

## 2023-02-23 DIAGNOSIS — L97.422 DIABETIC ULCER OF LEFT MIDFOOT ASSOCIATED WITH TYPE 2 DIABETES MELLITUS, WITH FAT LAYER EXPOSED: Primary | ICD-10-CM

## 2023-02-23 PROBLEM — L03.90 CELLULITIS: Status: ACTIVE | Noted: 2021-02-10

## 2023-02-23 LAB
ALBUMIN SERPL BCP-MCNC: 3.8 G/DL (ref 3.5–5.2)
ALP SERPL-CCNC: 84 U/L (ref 55–135)
ALT SERPL W/O P-5'-P-CCNC: 8 U/L (ref 10–44)
ANION GAP SERPL CALC-SCNC: 13 MMOL/L (ref 8–16)
AST SERPL-CCNC: 15 U/L (ref 10–40)
BACTERIA #/AREA URNS HPF: NORMAL /HPF
BASOPHILS # BLD AUTO: 0.14 K/UL (ref 0–0.2)
BASOPHILS NFR BLD: 0.8 % (ref 0–1.9)
BILIRUB SERPL-MCNC: 0.2 MG/DL (ref 0.1–1)
BILIRUB UR QL STRIP: NEGATIVE
BUN SERPL-MCNC: 16 MG/DL (ref 6–20)
CALCIUM SERPL-MCNC: 10.1 MG/DL (ref 8.7–10.5)
CHLORIDE SERPL-SCNC: 99 MMOL/L (ref 95–110)
CLARITY UR: CLEAR
CO2 SERPL-SCNC: 26 MMOL/L (ref 23–29)
COLOR UR: YELLOW
CREAT SERPL-MCNC: 0.8 MG/DL (ref 0.5–1.4)
CRP SERPL-MCNC: 10.1 MG/L (ref 0–8.2)
DIFFERENTIAL METHOD: ABNORMAL
EOSINOPHIL # BLD AUTO: 0.5 K/UL (ref 0–0.5)
EOSINOPHIL NFR BLD: 2.6 % (ref 0–8)
ERYTHROCYTE [DISTWIDTH] IN BLOOD BY AUTOMATED COUNT: 17.9 % (ref 11.5–14.5)
ERYTHROCYTE [SEDIMENTATION RATE] IN BLOOD BY WESTERGREN METHOD: 27 MM/HR (ref 0–20)
EST. GFR  (NO RACE VARIABLE): >60 ML/MIN/1.73 M^2
GLUCOSE SERPL-MCNC: 162 MG/DL (ref 70–110)
GLUCOSE UR QL STRIP: NEGATIVE
HCT VFR BLD AUTO: 37.8 % (ref 37–48.5)
HGB BLD-MCNC: 11.8 G/DL (ref 12–16)
HGB UR QL STRIP: NEGATIVE
IMM GRANULOCYTES # BLD AUTO: 0.08 K/UL (ref 0–0.04)
IMM GRANULOCYTES NFR BLD AUTO: 0.5 % (ref 0–0.5)
KETONES UR QL STRIP: NEGATIVE
LACTATE SERPL-SCNC: 2.4 MMOL/L (ref 0.5–2.2)
LACTATE SERPL-SCNC: 2.5 MMOL/L (ref 0.5–2.2)
LEUKOCYTE ESTERASE UR QL STRIP: ABNORMAL
LYMPHOCYTES # BLD AUTO: 6.1 K/UL (ref 1–4.8)
LYMPHOCYTES NFR BLD: 35.2 % (ref 18–48)
MCH RBC QN AUTO: 23.3 PG (ref 27–31)
MCHC RBC AUTO-ENTMCNC: 31.2 G/DL (ref 32–36)
MCV RBC AUTO: 75 FL (ref 82–98)
MICROSCOPIC COMMENT: NORMAL
MONOCYTES # BLD AUTO: 1.3 K/UL (ref 0.3–1)
MONOCYTES NFR BLD: 7.7 % (ref 4–15)
NEUTROPHILS # BLD AUTO: 9.3 K/UL (ref 1.8–7.7)
NEUTROPHILS NFR BLD: 53.2 % (ref 38–73)
NITRITE UR QL STRIP: NEGATIVE
NRBC BLD-RTO: 0 /100 WBC
PH UR STRIP: 6 [PH] (ref 5–8)
PLATELET # BLD AUTO: 863 K/UL (ref 150–450)
PMV BLD AUTO: 9.4 FL (ref 9.2–12.9)
POCT GLUCOSE: 135 MG/DL (ref 70–110)
POCT GLUCOSE: 174 MG/DL (ref 70–110)
POCT GLUCOSE: 248 MG/DL (ref 70–110)
POTASSIUM SERPL-SCNC: 4.1 MMOL/L (ref 3.5–5.1)
PROCALCITONIN SERPL IA-MCNC: 0.04 NG/ML
PROT SERPL-MCNC: 8.1 G/DL (ref 6–8.4)
PROT UR QL STRIP: NEGATIVE
RBC # BLD AUTO: 5.07 M/UL (ref 4–5.4)
SODIUM SERPL-SCNC: 138 MMOL/L (ref 136–145)
SP GR UR STRIP: 1.01 (ref 1–1.03)
SQUAMOUS #/AREA URNS HPF: 2 /HPF
URN SPEC COLLECT METH UR: ABNORMAL
UROBILINOGEN UR STRIP-ACNC: NEGATIVE EU/DL
WBC # BLD AUTO: 17.37 K/UL (ref 3.9–12.7)
WBC #/AREA URNS HPF: 1 /HPF (ref 0–5)

## 2023-02-23 PROCEDURE — 63600175 PHARM REV CODE 636 W HCPCS: Performed by: HOSPITALIST

## 2023-02-23 PROCEDURE — 86140 C-REACTIVE PROTEIN: CPT | Performed by: EMERGENCY MEDICINE

## 2023-02-23 PROCEDURE — 25000003 PHARM REV CODE 250: Performed by: EMERGENCY MEDICINE

## 2023-02-23 PROCEDURE — 63600175 PHARM REV CODE 636 W HCPCS: Performed by: PODIATRIST

## 2023-02-23 PROCEDURE — 85652 RBC SED RATE AUTOMATED: CPT | Performed by: EMERGENCY MEDICINE

## 2023-02-23 PROCEDURE — 96374 THER/PROPH/DIAG INJ IV PUSH: CPT

## 2023-02-23 PROCEDURE — 84145 PROCALCITONIN (PCT): CPT | Performed by: EMERGENCY MEDICINE

## 2023-02-23 PROCEDURE — G0378 HOSPITAL OBSERVATION PER HR: HCPCS

## 2023-02-23 PROCEDURE — 12000002 HC ACUTE/MED SURGE SEMI-PRIVATE ROOM

## 2023-02-23 PROCEDURE — 83605 ASSAY OF LACTIC ACID: CPT | Mod: 91 | Performed by: HOSPITALIST

## 2023-02-23 PROCEDURE — 83605 ASSAY OF LACTIC ACID: CPT | Performed by: EMERGENCY MEDICINE

## 2023-02-23 PROCEDURE — 99285 EMERGENCY DEPT VISIT HI MDM: CPT | Mod: 25

## 2023-02-23 PROCEDURE — 25000003 PHARM REV CODE 250: Performed by: PODIATRIST

## 2023-02-23 PROCEDURE — 96372 THER/PROPH/DIAG INJ SC/IM: CPT | Performed by: HOSPITALIST

## 2023-02-23 PROCEDURE — 85025 COMPLETE CBC W/AUTO DIFF WBC: CPT | Performed by: EMERGENCY MEDICINE

## 2023-02-23 PROCEDURE — 25000003 PHARM REV CODE 250: Performed by: HOSPITALIST

## 2023-02-23 PROCEDURE — 81000 URINALYSIS NONAUTO W/SCOPE: CPT | Performed by: EMERGENCY MEDICINE

## 2023-02-23 PROCEDURE — 96375 TX/PRO/DX INJ NEW DRUG ADDON: CPT

## 2023-02-23 PROCEDURE — 87040 BLOOD CULTURE FOR BACTERIA: CPT | Mod: 59 | Performed by: EMERGENCY MEDICINE

## 2023-02-23 PROCEDURE — 96376 TX/PRO/DX INJ SAME DRUG ADON: CPT

## 2023-02-23 PROCEDURE — 82962 GLUCOSE BLOOD TEST: CPT

## 2023-02-23 PROCEDURE — 80053 COMPREHEN METABOLIC PANEL: CPT | Performed by: EMERGENCY MEDICINE

## 2023-02-23 PROCEDURE — 63600175 PHARM REV CODE 636 W HCPCS: Performed by: EMERGENCY MEDICINE

## 2023-02-23 RX ORDER — ENOXAPARIN SODIUM 100 MG/ML
40 INJECTION SUBCUTANEOUS EVERY 24 HOURS
Status: DISCONTINUED | OUTPATIENT
Start: 2023-02-23 | End: 2023-03-01

## 2023-02-23 RX ORDER — ACETAMINOPHEN 325 MG/1
650 TABLET ORAL EVERY 6 HOURS PRN
Status: DISCONTINUED | OUTPATIENT
Start: 2023-02-23 | End: 2023-03-03 | Stop reason: HOSPADM

## 2023-02-23 RX ORDER — GLUCAGON 1 MG
1 KIT INJECTION
Status: DISCONTINUED | OUTPATIENT
Start: 2023-02-23 | End: 2023-03-03 | Stop reason: HOSPADM

## 2023-02-23 RX ORDER — RISPERIDONE 1 MG/ML
3 SOLUTION ORAL NIGHTLY
Status: DISCONTINUED | OUTPATIENT
Start: 2023-02-23 | End: 2023-03-03 | Stop reason: HOSPADM

## 2023-02-23 RX ORDER — HYDROMORPHONE HYDROCHLORIDE 1 MG/ML
0.5 INJECTION, SOLUTION INTRAMUSCULAR; INTRAVENOUS; SUBCUTANEOUS
Status: COMPLETED | OUTPATIENT
Start: 2023-02-23 | End: 2023-02-23

## 2023-02-23 RX ORDER — DEXTROSE 40 %
15 GEL (GRAM) ORAL
Status: DISCONTINUED | OUTPATIENT
Start: 2023-02-23 | End: 2023-03-03 | Stop reason: HOSPADM

## 2023-02-23 RX ORDER — NALOXONE HCL 0.4 MG/ML
0.02 VIAL (ML) INJECTION
Status: DISCONTINUED | OUTPATIENT
Start: 2023-02-23 | End: 2023-03-03 | Stop reason: HOSPADM

## 2023-02-23 RX ORDER — SIMETHICONE 80 MG
1 TABLET,CHEWABLE ORAL 4 TIMES DAILY PRN
Status: DISCONTINUED | OUTPATIENT
Start: 2023-02-23 | End: 2023-03-03 | Stop reason: HOSPADM

## 2023-02-23 RX ORDER — KETOROLAC TROMETHAMINE 30 MG/ML
15 INJECTION, SOLUTION INTRAMUSCULAR; INTRAVENOUS
Status: COMPLETED | OUTPATIENT
Start: 2023-02-23 | End: 2023-02-23

## 2023-02-23 RX ORDER — ONDANSETRON 2 MG/ML
4 INJECTION INTRAMUSCULAR; INTRAVENOUS
Status: COMPLETED | OUTPATIENT
Start: 2023-02-23 | End: 2023-02-23

## 2023-02-23 RX ORDER — METOPROLOL TARTRATE 25 MG/1
25 TABLET, FILM COATED ORAL 2 TIMES DAILY
Status: DISCONTINUED | OUTPATIENT
Start: 2023-02-23 | End: 2023-03-03 | Stop reason: HOSPADM

## 2023-02-23 RX ORDER — FENTANYL CITRATE 50 UG/ML
25 INJECTION, SOLUTION INTRAMUSCULAR; INTRAVENOUS
Status: COMPLETED | OUTPATIENT
Start: 2023-02-23 | End: 2023-02-23

## 2023-02-23 RX ORDER — PROCHLORPERAZINE EDISYLATE 5 MG/ML
5 INJECTION INTRAMUSCULAR; INTRAVENOUS EVERY 6 HOURS PRN
Status: DISCONTINUED | OUTPATIENT
Start: 2023-02-23 | End: 2023-03-03 | Stop reason: HOSPADM

## 2023-02-23 RX ORDER — LISINOPRIL 5 MG/1
10 TABLET ORAL DAILY
Status: DISCONTINUED | OUTPATIENT
Start: 2023-02-24 | End: 2023-03-03 | Stop reason: HOSPADM

## 2023-02-23 RX ORDER — DEXTROSE 40 %
30 GEL (GRAM) ORAL
Status: DISCONTINUED | OUTPATIENT
Start: 2023-02-23 | End: 2023-03-03 | Stop reason: HOSPADM

## 2023-02-23 RX ORDER — MAG HYDROX/ALUMINUM HYD/SIMETH 200-200-20
30 SUSPENSION, ORAL (FINAL DOSE FORM) ORAL 4 TIMES DAILY PRN
Status: DISCONTINUED | OUTPATIENT
Start: 2023-02-23 | End: 2023-03-03 | Stop reason: HOSPADM

## 2023-02-23 RX ORDER — TALC
6 POWDER (GRAM) TOPICAL NIGHTLY PRN
Status: DISCONTINUED | OUTPATIENT
Start: 2023-02-23 | End: 2023-03-03 | Stop reason: HOSPADM

## 2023-02-23 RX ORDER — POLYETHYLENE GLYCOL 3350 17 G/17G
17 POWDER, FOR SOLUTION ORAL DAILY
Status: DISCONTINUED | OUTPATIENT
Start: 2023-02-24 | End: 2023-03-03 | Stop reason: HOSPADM

## 2023-02-23 RX ORDER — PRAVASTATIN SODIUM 40 MG/1
40 TABLET ORAL NIGHTLY
Status: DISCONTINUED | OUTPATIENT
Start: 2023-02-23 | End: 2023-03-03 | Stop reason: HOSPADM

## 2023-02-23 RX ORDER — DIVALPROEX SODIUM 250 MG/1
500 TABLET, DELAYED RELEASE ORAL DAILY
Status: DISCONTINUED | OUTPATIENT
Start: 2023-02-24 | End: 2023-03-03 | Stop reason: HOSPADM

## 2023-02-23 RX ORDER — HYDROMORPHONE HYDROCHLORIDE 1 MG/ML
1 INJECTION, SOLUTION INTRAMUSCULAR; INTRAVENOUS; SUBCUTANEOUS EVERY 6 HOURS PRN
Status: DISCONTINUED | OUTPATIENT
Start: 2023-02-23 | End: 2023-02-28

## 2023-02-23 RX ORDER — ONDANSETRON 2 MG/ML
4 INJECTION INTRAMUSCULAR; INTRAVENOUS EVERY 8 HOURS PRN
Status: DISCONTINUED | OUTPATIENT
Start: 2023-02-23 | End: 2023-03-03 | Stop reason: HOSPADM

## 2023-02-23 RX ORDER — SODIUM CHLORIDE 0.9 % (FLUSH) 0.9 %
10 SYRINGE (ML) INJECTION EVERY 8 HOURS PRN
Status: DISCONTINUED | OUTPATIENT
Start: 2023-02-23 | End: 2023-03-01

## 2023-02-23 RX ORDER — INSULIN ASPART 100 [IU]/ML
1-10 INJECTION, SOLUTION INTRAVENOUS; SUBCUTANEOUS
Status: DISCONTINUED | OUTPATIENT
Start: 2023-02-23 | End: 2023-03-03 | Stop reason: HOSPADM

## 2023-02-23 RX ADMIN — VANCOMYCIN HYDROCHLORIDE 2500 MG: 500 INJECTION, POWDER, LYOPHILIZED, FOR SOLUTION INTRAVENOUS at 08:02

## 2023-02-23 RX ADMIN — HYDROMORPHONE HYDROCHLORIDE 0.5 MG: 1 INJECTION, SOLUTION INTRAMUSCULAR; INTRAVENOUS; SUBCUTANEOUS at 07:02

## 2023-02-23 RX ADMIN — RISPERIDONE 3 MG: 1 SOLUTION ORAL at 11:02

## 2023-02-23 RX ADMIN — PRAVASTATIN SODIUM 40 MG: 40 TABLET ORAL at 11:02

## 2023-02-23 RX ADMIN — HYDROMORPHONE HYDROCHLORIDE 1 MG: 1 INJECTION, SOLUTION INTRAMUSCULAR; INTRAVENOUS; SUBCUTANEOUS at 10:02

## 2023-02-23 RX ADMIN — KETOROLAC TROMETHAMINE 15 MG: 30 INJECTION, SOLUTION INTRAMUSCULAR; INTRAVENOUS at 05:02

## 2023-02-23 RX ADMIN — ENOXAPARIN SODIUM 40 MG: 40 INJECTION SUBCUTANEOUS at 10:02

## 2023-02-23 RX ADMIN — INSULIN ASPART 2 UNITS: 100 INJECTION, SOLUTION INTRAVENOUS; SUBCUTANEOUS at 10:02

## 2023-02-23 RX ADMIN — HYDROMORPHONE HYDROCHLORIDE 0.5 MG: 1 INJECTION, SOLUTION INTRAMUSCULAR; INTRAVENOUS; SUBCUTANEOUS at 06:02

## 2023-02-23 RX ADMIN — METOPROLOL TARTRATE 25 MG: 25 TABLET, FILM COATED ORAL at 11:02

## 2023-02-23 RX ADMIN — INSULIN DETEMIR 15 UNITS: 100 INJECTION, SOLUTION SUBCUTANEOUS at 10:02

## 2023-02-23 RX ADMIN — FENTANYL CITRATE 25 MCG: 50 INJECTION INTRAMUSCULAR; INTRAVENOUS at 05:02

## 2023-02-23 RX ADMIN — ONDANSETRON 4 MG: 2 INJECTION INTRAMUSCULAR; INTRAVENOUS at 07:02

## 2023-02-23 NOTE — ED PROVIDER NOTES
Encounter Date: 2/23/2023       History     Chief Complaint   Patient presents with    Leg Pain     51 yo female to triage for left leg pain and swelling from foot to groin. Pt says she's scheduled for an ultrasound to rule out DVT and to check for PVD but it isn't until March. Pt hardly able to walk w/ the pain. Sitting in personal wheelchair at this time. Pt denies SOB and CP.     Leg Swelling     52-year-old female with a history bipolar disorder, COPD, Charcot foot, multidrug resistant cellulitis presenting with persistent and worsening left lower extremity pain redness and swelling.  Patient was recently admitted for similar symptoms.  She was started on vancomycin.  Reports she is currently off of vancomycin.  Had recent follow-up with Infectious Disease.  Denies fevers or chills.  Notes left leg has become significantly more painful and warm.  Patient reports she is concerned she has a blood clot.  Denies fevers, chills, shortness of breath, nausea, vomiting, abdominal pain, diarrhea, constipation, vaginal bleeding or discharge.  Previously in usual state of health.    Review of patient's allergies indicates:   Allergen Reactions    Morphine Other (See Comments)     Patient had a psychotic episode after taking Morphine  Agitation, hallucinations    Penicillins Anaphylaxis     Tolerated cephalosporins in the past    Januvia [sitagliptin] Hives    Carbamazepine Other (See Comments)     hyponatremia     Past Medical History:   Diagnosis Date    ADHD (attention deficit hyperactivity disorder)     Arthritis     Asthma     Bipolar 1 disorder     Cataract     Cigarette smoker     COPD (chronic obstructive pulmonary disease)     Coronary artery disease     A fib    Depression     bipolar manic depresson    Diabetes mellitus     Diabetic foot ulcers     Diabetic neuropathy     DVT of lower extremity, bilateral 07/2013    bilateral LE DVT. Estelita filter placed.     Encounter for blood transfusion     History of  "blood clots 1. Left Leg=; 2.Bilateral Groin=Blood Clots= 5 or 2013 & 2013; 3. LLL of Lung=2013;  4. Lt. Lower Leg=2013.     Pt. had 1st Blood Clot after Ixcdbbyntszu=[id number], & Last=. Estelita Filter= Rt.Lateral Neck.    HTN (hypertension) 2013    Pt states that she does not have hypertension    Hypercholesteremia     Irregular heartbeat     Neuromuscular disorder     neuropathy feet    Obese     PE (pulmonary embolism) 2013    bilat LE DVT.     Restless leg syndrome      Past Surgical History:   Procedure Laterality Date    ABDOMINAL SURGERY      gastric sleeve    BILATERAL OOPHORECTOMY Bilateral 2015    CHOLECYSTECTOMY      DEBRIDEMENT OF FOOT Bilateral 5/10/2022    Procedure: DEBRIDEMENT, FOOT;  Surgeon: Maira De Los Santos DPM;  Location: [facility] OR;  Service: Podiatry;  Laterality: Bilateral;    Green' s filter Right 2012    Right Neck & Tunneled Down.    HERNIA REPAIR      "Maceo of Hernias Repaires around th [place].", pt. states    INCISION AND DRAINAGE FOOT Left 2022    Procedure: INCISION AND DRAINAGE, FOOT;  Surgeon: Fahad Razo DPM;  Location: [facility] OR;  Service: Podiatry;  Laterality: Left;    LAPAROSCOPIC CHOLECYSTECTOMY N/A 9/10/2020    Procedure: CHOLECYSTECTOMY, LAPAROSCOPIC;  Surgeon: Montrell Gutierrez MD;  Location: [facility] OR;  Service: General;  Laterality: N/A;  RN PREOP ----COVID Negative      OVARIAN CYST REMOVAL  3/13/2014    UT REMOVAL OF OVARY/TUBE(S)      SPLENECTOMY, TOTAL  2003    TONSILLECTOMY      as a child    TYMPANOSTOMY TUBE PLACEMENT  1976    VEIN SURGERY      Lt leg     Family History   Problem Relation Age of Onset    Hypertension Father     Diabetes Father     Heart disease Father     Cataracts Father     Diabetes Paternal Grandfather     Heart disease Paternal Grandfather     No Known Problems Mother     Ovarian cancer Maternal Grandmother          from this. ? age     No Known Problems Sister     No Known Problems " Brother     No Known Problems Maternal Aunt     No Known Problems Maternal Uncle     No Known Problems Paternal Aunt     No Known Problems Paternal Uncle     No Known Problems Maternal Grandfather     Ovarian cancer Paternal Grandmother     Uterine cancer Neg Hx     Breast cancer Neg Hx     Colon cancer Neg Hx     Amblyopia Neg Hx     Blindness Neg Hx     Cancer Neg Hx     Glaucoma Neg Hx     Macular degeneration Neg Hx     Retinal detachment Neg Hx     Strabismus Neg Hx     Stroke Neg Hx     Thyroid disease Neg Hx      Social History     Tobacco Use    Smoking status: Every Day     Packs/day: 1.00     Years: 37.00     Pack years: 37.00     Types: Cigarettes     Last attempt to quit: 2020     Years since quittin.2    Smokeless tobacco: Current    Tobacco comments:     Enrolled in the [employer] on 5/3/14 ([facility] Member ID # [id number]). Ambulatory referral to Smoking Cessation Program   Substance Use Topics    Alcohol use: No     Alcohol/week: 0.0 standard drinks    Drug use: No     Review of Systems   Constitutional:  Negative for chills and fever.   HENT:  Negative for sore throat.    Respiratory:  Negative for shortness of breath.    Cardiovascular:  Positive for leg swelling. Negative for chest pain.   Gastrointestinal:  Negative for nausea.   Genitourinary:  Negative for dysuria.   Musculoskeletal:  Positive for arthralgias and myalgias. Negative for back pain.   Skin:  Positive for color change and wound. Negative for rash.   Neurological:  Negative for weakness.     Physical Exam     Initial Vitals [23 1518]   BP Pulse Resp Temp SpO2   (!) 144/67 90 17 98.7 °F (37.1 °C) 97 %      MAP       --         Physical Exam    Nursing note and vitals reviewed.  Constitutional: She appears well-developed and well-nourished. She is not diaphoretic. No distress.   HENT:   Head: Normocephalic and atraumatic.   Nose: Nose normal.   Eyes: EOM are normal. Pupils are equal, round, and reactive to light. No  scleral icterus.   Neck: Neck supple.   Normal range of motion.  Cardiovascular:  Normal rate, regular rhythm, normal heart sounds and intact distal pulses.     Exam reveals no gallop and no friction rub.       No murmur heard.  Pulmonary/Chest: Breath sounds normal. No stridor. No respiratory distress. She has no wheezes. She has no rhonchi. She has no rales.   Abdominal: Abdomen is soft. Bowel sounds are normal. She exhibits no distension. There is no abdominal tenderness. There is no rebound and no guarding.   Musculoskeletal:         General: Tenderness and edema present. Normal range of motion.      Cervical back: Normal range of motion and neck supple.      Comments: Edema tenderness and warmth of left lower extremity.  Normal range of motion of ankle knee and hip     Neurological: She is alert and oriented to person, place, and time. No cranial nerve deficit. GCS score is 15. GCS eye subscore is 4. GCS verbal subscore is 5. GCS motor subscore is 6.   Skin: Skin is warm and dry. No rash noted.   Large beefy granular wound of left plantar foot   Psychiatric: She has a normal mood and affect. Her behavior is normal.       ED Course   Procedures  Labs Reviewed   LACTIC ACID, PLASMA - Abnormal; Notable for the following components:       Result Value    Lactate (Lactic Acid) 2.4 (*)     All other components within normal limits   SEDIMENTATION RATE - Abnormal; Notable for the following components:    Sed Rate 27 (*)     All other components within normal limits   C-REACTIVE PROTEIN - Abnormal; Notable for the following components:    CRP 10.1 (*)     All other components within normal limits   CBC W/ AUTO DIFFERENTIAL - Abnormal; Notable for the following components:    WBC 17.37 (*)     Hemoglobin 11.8 (*)     MCV 75 (*)     MCH 23.3 (*)     MCHC 31.2 (*)     RDW 17.9 (*)     Platelets 863 (*)     Gran # (ANC) 9.3 (*)     Immature Grans (Abs) 0.08 (*)     Lymph # 6.1 (*)     Mono # 1.3 (*)     All other  components within normal limits   COMPREHENSIVE METABOLIC PANEL - Abnormal; Notable for the following components:    Glucose 162 (*)     ALT 8 (*)     All other components within normal limits   URINALYSIS, REFLEX TO URINE CULTURE - Abnormal; Notable for the following components:    Leukocytes, UA Trace (*)     All other components within normal limits    Narrative:     Specimen Source->Urine   POCT GLUCOSE - Abnormal; Notable for the following components:    POCT Glucose 174 (*)     All other components within normal limits   POCT GLUCOSE - Abnormal; Notable for the following components:    POCT Glucose 135 (*)     All other components within normal limits   CULTURE, BLOOD   CULTURE, BLOOD   URINALYSIS MICROSCOPIC    Narrative:     Specimen Source->Urine   PROCALCITONIN          Imaging Results              X-Ray Chest 1 View (Final result)  Result time 02/23/23 18:08:09      Final result by Shalom Bro MD (02/23/23 18:08:09)                   Impression:      No acute cardiopulmonary process identified.      Electronically signed by: Shalom Bro MD  Date:    02/23/2023  Time:    18:08               Narrative:    EXAMINATION:  XR CHEST 1 VIEW    CLINICAL HISTORY:  Systemic inflammatory response syndrome (sirs) of non-infectious origin without acute organ dysfunction    TECHNIQUE:  Single frontal view of the chest was performed.    COMPARISON:  01/11/2023.    FINDINGS:  Cardiac silhouette is normal in size.  Lungs are symmetrically expanded.  No evidence of focal consolidative process, pneumothorax, or significant pleural effusion.  No acute osseous abnormality identified.                                       US Lower Extremity Veins Left (Final result)  Result time 02/23/23 17:03:35      Final result by Shalom Bro MD (02/23/23 17:03:35)                   Impression:      No evidence of left lower extremity deep venous thrombosis.      Electronically signed by: Shalom Bro  MD  Date:    02/23/2023  Time:    17:03               Narrative:    EXAMINATION:  US LOWER EXTREMITY VEINS LEFT    CLINICAL HISTORY:  Other specified soft tissue disorders    TECHNIQUE:  Duplex and color flow Doppler evaluation of the left lower extremity veins was performed.    COMPARISON:  01/09/2023.    FINDINGS:  No evidence of clot involving the bilateral common femoral veins or left greater saphenous, femoral, popliteal, peroneal, anterior and posterior tibial veins.  All venous structures demonstrate normal respiratory phasicity and augment adequately.  No evidence of soft tissue mass or Baker's cyst.                                       X-Ray Foot Complete Left (Final result)  Result time 02/23/23 16:34:20      Final result by Tu Santos Jr., MD (02/23/23 16:34:20)                   Impression:      No radiographic evidence of osteomyelitis      Electronically signed by: Tu Restrepo Jr  Date:    02/23/2023  Time:    16:34               Narrative:    EXAMINATION:  XR FOOT COMPLETE 3 VIEW LEFT    CLINICAL HISTORY:  Type 2 diabetes mellitus with foot ulcer    TECHNIQUE:  XR FOOT COMPLETE 3 VIEW LEFT    COMPARISON:  01/09/2023    FINDINGS:  Large area of soft tissue ulceration is seen involving the plantar aspect of the midfoot with Charcot neuropathic changes seen throughout the midfoot.  No gross bone erosion, destruction, or aggressive periosteal reaction.  Nonspecific soft tissue swelling of the foot.                                       Medications   vancomycin - pharmacy to dose (has no administration in time range)   vancomycin (VANCOCIN) 2,500 mg in dextrose 5 % (D5W) 500 mL IVPB (2,500 mg Intravenous New Bag 2/23/23 2002)   vancomycin (VANCOCIN) 1,750 mg in dextrose 5 % (D5W) 500 mL IVPB (1,750 mg Intravenous Trough Due As Scheduled Before Dose 2/25/23 0700)   ketorolac injection 15 mg (15 mg Intravenous Given 2/23/23 1733)   fentaNYL 50 mcg/mL injection 25 mcg (25 mcg Intravenous Given  2/23/23 1732)   HYDROmorphone injection 0.5 mg (0.5 mg Intravenous Given 2/23/23 1821)   ondansetron injection 4 mg (4 mg Intravenous Given 2/23/23 1956)   HYDROmorphone injection 0.5 mg (0.5 mg Intravenous Given 2/23/23 1956)     Medical Decision Making:   Initial Assessment:   52-year-old female presenting with pain redness and warmth of left lower extremity.  Recent admission for cellulitis.  No longer on antibiotics.  Previously thought to be due to MRSA.  Patient requiring multiple dosages of pain medication.  Leukocytosis noted.  Minimally elevated lactic acid.  Concern for left lower extremity cellulitis.  Will restart on vancomycin.  Will placed observation for further evaluation and treatment.  No abdominal tenderness, negative obturator and psoas sign, chest x-ray clear, urinalysis without evidence of infection.                        Clinical Impression:   Final diagnoses:  [M79.89] Left leg swelling  [E11.621, L97.529] Diabetic ulcer of left foot  [R65.10] SIRS (systemic inflammatory response syndrome)  [M79.605] Left leg pain (Primary)  [L03.116] Cellulitis of left leg        ED Disposition Condition    Observation Stable                Ricky Cisse MD  02/23/23 2115

## 2023-02-23 NOTE — ED TRIAGE NOTES
Pt states she had surgery in December of 2022 and since then she has been having pain the left foot and has a wound in which wound care come Twice a week to treat. Pt states the pain radiates from her left foot to her left groin. Pt. Has headaches x 1 week. No complaints of N/V. Patient has a non productive cough. Pt has a pmh of diabetes. Patient is calm, alert and oriented x 4. Pt uses a wheel chair for mobility.

## 2023-02-24 PROBLEM — M86.9 OSTEOMYELITIS OF LEFT FOOT: Status: ACTIVE | Noted: 2021-02-10

## 2023-02-24 LAB
ALBUMIN SERPL BCP-MCNC: 3.2 G/DL (ref 3.5–5.2)
ALP SERPL-CCNC: 68 U/L (ref 55–135)
ALT SERPL W/O P-5'-P-CCNC: 9 U/L (ref 10–44)
ANION GAP SERPL CALC-SCNC: 10 MMOL/L (ref 8–16)
AST SERPL-CCNC: 14 U/L (ref 10–40)
BASOPHILS # BLD AUTO: 0.11 K/UL (ref 0–0.2)
BASOPHILS NFR BLD: 1 % (ref 0–1.9)
BILIRUB SERPL-MCNC: 0.4 MG/DL (ref 0.1–1)
BUN SERPL-MCNC: 17 MG/DL (ref 6–20)
CALCIUM SERPL-MCNC: 9.5 MG/DL (ref 8.7–10.5)
CHLORIDE SERPL-SCNC: 100 MMOL/L (ref 95–110)
CO2 SERPL-SCNC: 28 MMOL/L (ref 23–29)
CREAT SERPL-MCNC: 0.7 MG/DL (ref 0.5–1.4)
DIFFERENTIAL METHOD: ABNORMAL
EOSINOPHIL # BLD AUTO: 0.6 K/UL (ref 0–0.5)
EOSINOPHIL NFR BLD: 5.4 % (ref 0–8)
ERYTHROCYTE [DISTWIDTH] IN BLOOD BY AUTOMATED COUNT: 17.8 % (ref 11.5–14.5)
EST. GFR  (NO RACE VARIABLE): >60 ML/MIN/1.73 M^2
GLUCOSE SERPL-MCNC: 140 MG/DL (ref 70–110)
GRAM STN SPEC: NORMAL
GRAM STN SPEC: NORMAL
HCT VFR BLD AUTO: 32.8 % (ref 37–48.5)
HGB BLD-MCNC: 10 G/DL (ref 12–16)
IMM GRANULOCYTES # BLD AUTO: 0.05 K/UL (ref 0–0.04)
IMM GRANULOCYTES NFR BLD AUTO: 0.5 % (ref 0–0.5)
LYMPHOCYTES # BLD AUTO: 4.4 K/UL (ref 1–4.8)
LYMPHOCYTES NFR BLD: 40.5 % (ref 18–48)
MCH RBC QN AUTO: 23 PG (ref 27–31)
MCHC RBC AUTO-ENTMCNC: 30.5 G/DL (ref 32–36)
MCV RBC AUTO: 75 FL (ref 82–98)
MONOCYTES # BLD AUTO: 1.4 K/UL (ref 0.3–1)
MONOCYTES NFR BLD: 13.1 % (ref 4–15)
NEUTROPHILS # BLD AUTO: 4.3 K/UL (ref 1.8–7.7)
NEUTROPHILS NFR BLD: 39.5 % (ref 38–73)
NRBC BLD-RTO: 0 /100 WBC
PLATELET # BLD AUTO: 723 K/UL (ref 150–450)
PMV BLD AUTO: 9.6 FL (ref 9.2–12.9)
POCT GLUCOSE: 145 MG/DL (ref 70–110)
POCT GLUCOSE: 180 MG/DL (ref 70–110)
POCT GLUCOSE: 184 MG/DL (ref 70–110)
POCT GLUCOSE: 217 MG/DL (ref 70–110)
POTASSIUM SERPL-SCNC: 4.1 MMOL/L (ref 3.5–5.1)
PROT SERPL-MCNC: 6.5 G/DL (ref 6–8.4)
RBC # BLD AUTO: 4.35 M/UL (ref 4–5.4)
SODIUM SERPL-SCNC: 138 MMOL/L (ref 136–145)
WBC # BLD AUTO: 10.75 K/UL (ref 3.9–12.7)

## 2023-02-24 PROCEDURE — A9585 GADOBUTROL INJECTION: HCPCS | Performed by: INTERNAL MEDICINE

## 2023-02-24 PROCEDURE — 63600175 PHARM REV CODE 636 W HCPCS: Performed by: HOSPITALIST

## 2023-02-24 PROCEDURE — 99223 1ST HOSP IP/OBS HIGH 75: CPT | Mod: ,,, | Performed by: PODIATRIST

## 2023-02-24 PROCEDURE — 87077 CULTURE AEROBIC IDENTIFY: CPT | Performed by: PODIATRIST

## 2023-02-24 PROCEDURE — C1751 CATH, INF, PER/CENT/MIDLINE: HCPCS

## 2023-02-24 PROCEDURE — 87205 SMEAR GRAM STAIN: CPT | Performed by: PODIATRIST

## 2023-02-24 PROCEDURE — 25500020 PHARM REV CODE 255: Performed by: INTERNAL MEDICINE

## 2023-02-24 PROCEDURE — 63600175 PHARM REV CODE 636 W HCPCS: Performed by: INTERNAL MEDICINE

## 2023-02-24 PROCEDURE — 87070 CULTURE OTHR SPECIMN AEROBIC: CPT | Performed by: PODIATRIST

## 2023-02-24 PROCEDURE — 96372 THER/PROPH/DIAG INJ SC/IM: CPT | Performed by: HOSPITALIST

## 2023-02-24 PROCEDURE — 87075 CULTR BACTERIA EXCEPT BLOOD: CPT | Performed by: PODIATRIST

## 2023-02-24 PROCEDURE — 99214 PR OFFICE/OUTPT VISIT, EST, LEVL IV, 30-39 MIN: ICD-10-PCS | Mod: ,,, | Performed by: INTERNAL MEDICINE

## 2023-02-24 PROCEDURE — 11000001 HC ACUTE MED/SURG PRIVATE ROOM

## 2023-02-24 PROCEDURE — 25000003 PHARM REV CODE 250: Performed by: PODIATRIST

## 2023-02-24 PROCEDURE — 85025 COMPLETE CBC W/AUTO DIFF WBC: CPT | Performed by: HOSPITALIST

## 2023-02-24 PROCEDURE — 80053 COMPREHEN METABOLIC PANEL: CPT | Performed by: HOSPITALIST

## 2023-02-24 PROCEDURE — G0378 HOSPITAL OBSERVATION PER HR: HCPCS

## 2023-02-24 PROCEDURE — 63600175 PHARM REV CODE 636 W HCPCS: Performed by: PODIATRIST

## 2023-02-24 PROCEDURE — 36410 VNPNXR 3YR/> PHY/QHP DX/THER: CPT

## 2023-02-24 PROCEDURE — 25000003 PHARM REV CODE 250: Performed by: INTERNAL MEDICINE

## 2023-02-24 PROCEDURE — 99223 PR INITIAL HOSPITAL CARE,LEVL III: ICD-10-PCS | Mod: ,,, | Performed by: PODIATRIST

## 2023-02-24 PROCEDURE — 87186 SC STD MICRODIL/AGAR DIL: CPT | Performed by: PODIATRIST

## 2023-02-24 PROCEDURE — 25000003 PHARM REV CODE 250: Performed by: HOSPITALIST

## 2023-02-24 PROCEDURE — 99214 OFFICE O/P EST MOD 30 MIN: CPT | Mod: ,,, | Performed by: INTERNAL MEDICINE

## 2023-02-24 RX ORDER — GADOBUTROL 604.72 MG/ML
10 INJECTION INTRAVENOUS
Status: COMPLETED | OUTPATIENT
Start: 2023-02-24 | End: 2023-02-24

## 2023-02-24 RX ADMIN — METOPROLOL TARTRATE 25 MG: 25 TABLET, FILM COATED ORAL at 09:02

## 2023-02-24 RX ADMIN — INSULIN DETEMIR 15 UNITS: 100 INJECTION, SOLUTION SUBCUTANEOUS at 09:02

## 2023-02-24 RX ADMIN — HYDROMORPHONE HYDROCHLORIDE 1 MG: 1 INJECTION, SOLUTION INTRAMUSCULAR; INTRAVENOUS; SUBCUTANEOUS at 08:02

## 2023-02-24 RX ADMIN — INSULIN ASPART 2 UNITS: 100 INJECTION, SOLUTION INTRAVENOUS; SUBCUTANEOUS at 09:02

## 2023-02-24 RX ADMIN — LISINOPRIL 10 MG: 5 TABLET ORAL at 09:02

## 2023-02-24 RX ADMIN — VANCOMYCIN HYDROCHLORIDE 1750 MG: 500 INJECTION, POWDER, LYOPHILIZED, FOR SOLUTION INTRAVENOUS at 09:02

## 2023-02-24 RX ADMIN — DIVALPROEX SODIUM 500 MG: 250 TABLET, DELAYED RELEASE ORAL at 09:02

## 2023-02-24 RX ADMIN — POLYETHYLENE GLYCOL 3350 17 G: 17 POWDER, FOR SOLUTION ORAL at 09:02

## 2023-02-24 RX ADMIN — HYDROMORPHONE HYDROCHLORIDE 1 MG: 1 INJECTION, SOLUTION INTRAMUSCULAR; INTRAVENOUS; SUBCUTANEOUS at 03:02

## 2023-02-24 RX ADMIN — GADOBUTROL 10 ML: 604.72 INJECTION INTRAVENOUS at 11:02

## 2023-02-24 RX ADMIN — HYDROMORPHONE HYDROCHLORIDE 1 MG: 1 INJECTION, SOLUTION INTRAMUSCULAR; INTRAVENOUS; SUBCUTANEOUS at 09:02

## 2023-02-24 RX ADMIN — ENOXAPARIN SODIUM 40 MG: 40 INJECTION SUBCUTANEOUS at 04:02

## 2023-02-24 RX ADMIN — RISPERIDONE 3 MG: 1 SOLUTION ORAL at 09:02

## 2023-02-24 RX ADMIN — PRAVASTATIN SODIUM 40 MG: 40 TABLET ORAL at 09:02

## 2023-02-24 NOTE — ASSESSMENT & PLAN NOTE
Patient's FSGs are controlled on current medication regimen.  Last A1c reviewed-   Lab Results   Component Value Date    HGBA1C 7.1 (H) 12/22/2022     Most recent fingerstick glucose reviewed-   Recent Labs   Lab 02/23/23  1521 02/23/23  1948 02/23/23  2245   POCTGLUCOSE 174* 135* 248*     Current correctional scale  Medium  Maintain anti-hyperglycemic dose as follows-   Antihyperglycemics (From admission, onward)    Start     Stop Route Frequency Ordered    02/23/23 2330  insulin detemir U-100 pen 15 Units         -- SubQ Nightly 02/23/23 2229 02/23/23 2328  insulin aspart U-100 pen 1-10 Units         -- SubQ Before meals & nightly PRN 02/23/23 2229        Hold Oral hypoglycemics while patient is in the hospital.

## 2023-02-24 NOTE — PROGRESS NOTES
"[facility] Medicine  Progress Note    Patient Name: Audrey Natarajan  MRN: [id number]  Patient Class: OP- Observation   Admission Date: 2/23/2023  Length of Stay: 0 days  Attending Physician: Micha Grossman MD  Primary Care Provider: Donaldo Pena MD        Subjective:     Principal Problem:Osteomyelitis of left foot        HPI:  52 y.o. female with hypertension, hyperlipidemia, COPD, bipolar 1 disorder, DM2 presents with a complaint of foot pain.  Appears to be a chronic intermittent problem for some time, associated with erythema and edema of the extremity.  Was on IV vancomycin for awhile.  Has home health wound care.  Sees Podiatry Dr. De Los Santos and Infectious Disease Dr. Jarocho panchal.  Denies fever, chills, cough, SOB, chest pain, palpitations, dizziness, syncope, nausea, vomiting, diarrhea, abdominal pain.  In the ED, she was found to have leukocytosis with elevated lactic acid.  Inflammatory markers are also elevated.  X-ray of the foot an ultrasound of the veins of the lower extremity were unremarkable for acute abnormality.  Blood cultures were obtained and she was started on IV vancomycin in the ED.  Placed in observation.      Overview/Hospital Course:  Audrey Natarajan was placed under observation for management of left plantar foot ulceration, and need for further evaluation by podiatry and infectious disease.    Podiatry evaluated the patient and conducted a bedside debridement.  Cultures have been sent off and are pending.  Options were discussed with the patient by Podiatry which included amputation versus IV antibiotics for 6 weeks.  MRI midfoot obtained which shows: "Similar appearance of extensive underlying Charcot changes at the midfoot and metatarsal bases with concern for osteomyelitis at the inferior cuboid."  Podiatry evaluated the patient and recommends bone biopsy as well as holding antibiotics.      Interval History: NAEON; seen s/p bedside debridement of foot. " Left foot wrapped and CDI. Blood cultures NGTD. Complaining of 5/10 LLQ abdominal pain.    Review of Systems   Constitutional:  Positive for fatigue. Negative for activity change, appetite change and chills.   HENT:  Negative for congestion, ear pain, postnasal drip, rhinorrhea, sinus pain, sneezing, sore throat and trouble swallowing.    Eyes:  Negative for pain, redness and visual disturbance.   Respiratory:  Negative for cough, chest tightness, shortness of breath and wheezing.    Cardiovascular:  Negative for chest pain and leg swelling.   Gastrointestinal:  Positive for abdominal pain. Negative for abdominal distention, diarrhea, nausea and vomiting.   Genitourinary:  Negative for decreased urine volume, difficulty urinating, dysuria, frequency, hematuria and urgency.   Musculoskeletal:  Positive for gait problem and joint swelling. Negative for arthralgias and back pain.   Skin:  Positive for color change and wound.   Neurological:  Negative for dizziness, weakness and headaches.   Objective:     Vital Signs (Most Recent):  Temp: 98.5 °F (36.9 °C) (02/24/23 1158)  Pulse: 68 (02/24/23 1158)  Resp: 20 (02/24/23 1500)  BP: (!) 119/58 (02/24/23 1158)  SpO2: (!) 93 % (02/24/23 1158)   Vital Signs (24h Range):  Temp:  [97.9 °F (36.6 °C)-98.7 °F (37.1 °C)] 98.5 °F (36.9 °C)  Pulse:  [65-90] 68  Resp:  [0-28] 20  SpO2:  [87 %-97 %] 93 %  BP: (103-166)/(53-78) 119/58     Weight: 106.6 kg (235 lb)  Body mass index is 37.93 kg/m².  No intake or output data in the 24 hours ending 02/24/23 1505   Physical Exam  Vitals reviewed.   Constitutional:       General: She is not in acute distress.     Appearance: Normal appearance. She is obese. She is not ill-appearing.   HENT:      Head: Normocephalic and atraumatic.      Right Ear: External ear normal.      Left Ear: External ear normal.      Nose: Nose normal.      Mouth/Throat:      Mouth: Mucous membranes are moist.      Pharynx: Oropharynx is clear.   Eyes:      General:          Right eye: No discharge.         Left eye: No discharge.      Extraocular Movements: Extraocular movements intact.      Pupils: Pupils are equal, round, and reactive to light.   Cardiovascular:      Rate and Rhythm: Normal rate.      Pulses: Normal pulses.      Heart sounds: Normal heart sounds. No murmur heard.    No friction rub. No gallop.   Pulmonary:      Effort: Pulmonary effort is normal. No respiratory distress.      Breath sounds: Normal breath sounds. No wheezing.   Abdominal:      General: Bowel sounds are normal. There is no distension.      Palpations: Abdomen is soft.      Tenderness: There is abdominal tenderness. There is no guarding.   Musculoskeletal:         General: No swelling. Normal range of motion.      Cervical back: Normal range of motion.   Feet:      Left foot:      Skin integrity: Ulcer and skin breakdown present.      Comments: Left foot s/p bedside debridement by podiatry; dressing is CDI.  Skin:     General: Skin is warm.      Capillary Refill: Capillary refill takes less than 2 seconds.   Neurological:      General: No focal deficit present.      Mental Status: She is alert and oriented to person, place, and time.      Motor: No weakness.       Significant Labs: All pertinent labs within the past 24 hours have been reviewed.  Bilirubin:   Recent Labs   Lab 02/23/23  1558 02/24/23  0417   BILITOT 0.2 0.4     Blood Culture:   Recent Labs   Lab 02/23/23  1558 02/23/23  1614   LABBLOO No Growth to date No Growth to date     BMP:   Recent Labs   Lab 02/24/23  0417   *      K 4.1      CO2 28   BUN 17   CREATININE 0.7   CALCIUM 9.5     CBC:   Recent Labs   Lab 02/23/23  1558 02/24/23  0417   WBC 17.37* 10.75   HGB 11.8* 10.0*   HCT 37.8 32.8*   * 723*     CMP:   Recent Labs   Lab 02/23/23  1558 02/24/23  0417    138   K 4.1 4.1   CL 99 100   CO2 26 28   * 140*   BUN 16 17   CREATININE 0.8 0.7   CALCIUM 10.1 9.5   PROT 8.1 6.5   ALBUMIN 3.8 3.2*  "  BILITOT 0.2 0.4   ALKPHOS 84 68   AST 15 14   ALT 8* 9*   ANIONGAP 13 10     Lactic Acid:   Recent Labs   Lab 02/23/23  1558 02/23/23  2246   LACTATE 2.4* 2.5*     POCT Glucose:   Recent Labs   Lab 02/23/23  2245 02/24/23  0239 02/24/23  1158   POCTGLUCOSE 248* 145* 184*     TSH:   Recent Labs   Lab 12/22/22  1755   TSH 1.880     Urine Studies:   Recent Labs   Lab 02/23/23  1842   COLORU Yellow   APPEARANCEUA Clear   PHUR 6.0   SPECGRAV 1.010   PROTEINUA Negative   GLUCUA Negative   KETONESU Negative   BILIRUBINUA Negative   OCCULTUA Negative   NITRITE Negative   UROBILINOGEN Negative   LEUKOCYTESUR Trace*   WBCUA 1   BACTERIA Rare   SQUAMEPITHEL 2       Significant Imaging: I have reviewed all pertinent imaging results/findings within the past 24 hours.      Assessment/Plan:      * Osteomyelitis of left foot  Erythema, edema, with worsening pain.  Leukocytosis and elevated lactic acid elevated inflammatory markers on lab work.  Blood cultures are pending.  Continue IV antibiotics.  Podiatry and ID consulted.    02/24: Leukocytosis resolved; s/p bedside debridement by Podiatry; MRI foot shows "Similar appearance of extensive underlying Charcot changes at the midfoot and metatarsal bases with concern for osteomyelitis at the inferior cuboid." Cultures obtained and pending. ID recommending bone biopsy and holding abx to increase yield of culture.     SHAWN (obstructive sleep apnea)  CPAP QHS    Type 2 diabetes mellitus  Patient's FSGs are controlled on current medication regimen.  Last A1c reviewed-   Lab Results   Component Value Date    HGBA1C 7.1 (H) 12/22/2022     Most recent fingerstick glucose reviewed-   Recent Labs   Lab 02/23/23  1948 02/23/23  2245 02/24/23  0239 02/24/23  1158   POCTGLUCOSE 135* 248* 145* 184*     Current correctional scale  Medium  Maintain anti-hyperglycemic dose as follows-   Antihyperglycemics (From admission, onward)      Start     Stop Route Frequency Ordered    02/23/23 2330  insulin " detemir U-100 pen 15 Units         -- SubQ Nightly 02/23/23 2229 02/23/23 2328  insulin aspart U-100 pen 1-10 Units         -- SubQ Before meals & nightly PRN 02/23/23 2229        Diabetic Diet  Hold Oral hypoglycemics while patient is in the hospital.    Bipolar 1 disorder  Continue home regimen of risperidone    Tobacco abuse  5 minutes spent counseling the patient on smoking cessation and she is not currently ready to stop smoking. She will be monitored for withdrawal.  She will be provided with additional smoking cessation counseling prior to discharge.    Hyperlipidemia  Continue statin    COPD (chronic obstructive pulmonary disease)  P.r.n. nebs    Essential hypertension  Well controlled, continue home medications and monitor blood pressure, adjust as needed.       VTE Risk Mitigation (From admission, onward)           Ordered     enoxaparin injection 40 mg  Daily         02/23/23 2229     IP VTE HIGH RISK PATIENT  Once         02/23/23 2229     Place sequential compression device  Until discontinued         02/23/23 2229                    Discharge Planning   HUMBERTO:      Code Status: Full Code   Is the patient medically ready for discharge?:     Reason for patient still in hospital (select all that apply): Patient trending condition and Treatment  Discharge Plan A: Home          Javier Goodwin PA-C  Department of Hospital Medicine  [facility]  02/24/2023

## 2023-02-24 NOTE — SUBJECTIVE & OBJECTIVE
Interval History: NAEON; seen s/p bedside debridement of foot. Left foot wrapped and CDI. Blood cultures NGTD. Complaining of 5/10 LLQ abdominal pain.    Review of Systems   Constitutional:  Positive for fatigue. Negative for activity change, appetite change and chills.   HENT:  Negative for congestion, ear pain, postnasal drip, rhinorrhea, sinus pain, sneezing, sore throat and trouble swallowing.    Eyes:  Negative for pain, redness and visual disturbance.   Respiratory:  Negative for cough, chest tightness, shortness of breath and wheezing.    Cardiovascular:  Negative for chest pain and leg swelling.   Gastrointestinal:  Positive for abdominal pain. Negative for abdominal distention, diarrhea, nausea and vomiting.   Genitourinary:  Negative for decreased urine volume, difficulty urinating, dysuria, frequency, hematuria and urgency.   Musculoskeletal:  Positive for gait problem and joint swelling. Negative for arthralgias and back pain.   Skin:  Positive for color change and wound.   Neurological:  Negative for dizziness, weakness and headaches.   Objective:     Vital Signs (Most Recent):  Temp: 98.5 °F (36.9 °C) (02/24/23 1158)  Pulse: 68 (02/24/23 1158)  Resp: 20 (02/24/23 1500)  BP: (!) 119/58 (02/24/23 1158)  SpO2: (!) 93 % (02/24/23 1158)   Vital Signs (24h Range):  Temp:  [97.9 °F (36.6 °C)-98.7 °F (37.1 °C)] 98.5 °F (36.9 °C)  Pulse:  [65-90] 68  Resp:  [0-28] 20  SpO2:  [87 %-97 %] 93 %  BP: (103-166)/(53-78) 119/58     Weight: 106.6 kg (235 lb)  Body mass index is 37.93 kg/m².  No intake or output data in the 24 hours ending 02/24/23 1505   Physical Exam  Vitals reviewed.   Constitutional:       General: She is not in acute distress.     Appearance: Normal appearance. She is obese. She is not ill-appearing.   HENT:      Head: Normocephalic and atraumatic.      Right Ear: External ear normal.      Left Ear: External ear normal.      Nose: Nose normal.      Mouth/Throat:      Mouth: Mucous membranes are  moist.      Pharynx: Oropharynx is clear.   Eyes:      General:         Right eye: No discharge.         Left eye: No discharge.      Extraocular Movements: Extraocular movements intact.      Pupils: Pupils are equal, round, and reactive to light.   Cardiovascular:      Rate and Rhythm: Normal rate.      Pulses: Normal pulses.      Heart sounds: Normal heart sounds. No murmur heard.    No friction rub. No gallop.   Pulmonary:      Effort: Pulmonary effort is normal. No respiratory distress.      Breath sounds: Normal breath sounds. No wheezing.   Abdominal:      General: Bowel sounds are normal. There is no distension.      Palpations: Abdomen is soft.      Tenderness: There is abdominal tenderness. There is no guarding.   Musculoskeletal:         General: No swelling. Normal range of motion.      Cervical back: Normal range of motion.   Feet:      Left foot:      Skin integrity: Ulcer and skin breakdown present.      Comments: Left foot s/p bedside debridement by podiatry; dressing is CDI.  Skin:     General: Skin is warm.      Capillary Refill: Capillary refill takes less than 2 seconds.   Neurological:      General: No focal deficit present.      Mental Status: She is alert and oriented to person, place, and time.      Motor: No weakness.       Significant Labs: All pertinent labs within the past 24 hours have been reviewed.  Bilirubin:   Recent Labs   Lab 02/23/23  1558 02/24/23  0417   BILITOT 0.2 0.4     Blood Culture:   Recent Labs   Lab 02/23/23  1558 02/23/23  1614   LABBLOO No Growth to date No Growth to date     BMP:   Recent Labs   Lab 02/24/23  0417   *      K 4.1      CO2 28   BUN 17   CREATININE 0.7   CALCIUM 9.5     CBC:   Recent Labs   Lab 02/23/23  1558 02/24/23  0417   WBC 17.37* 10.75   HGB 11.8* 10.0*   HCT 37.8 32.8*   * 723*     CMP:   Recent Labs   Lab 02/23/23  1558 02/24/23  0417    138   K 4.1 4.1   CL 99 100   CO2 26 28   * 140*   BUN 16 17    CREATININE 0.8 0.7   CALCIUM 10.1 9.5   PROT 8.1 6.5   ALBUMIN 3.8 3.2*   BILITOT 0.2 0.4   ALKPHOS 84 68   AST 15 14   ALT 8* 9*   ANIONGAP 13 10     Lactic Acid:   Recent Labs   Lab 02/23/23  1558 02/23/23  2246   LACTATE 2.4* 2.5*     POCT Glucose:   Recent Labs   Lab 02/23/23  2245 02/24/23  0239 02/24/23  1158   POCTGLUCOSE 248* 145* 184*     TSH:   Recent Labs   Lab 12/22/22  1755   TSH 1.880     Urine Studies:   Recent Labs   Lab 02/23/23  1842   COLORU Yellow   APPEARANCEUA Clear   PHUR 6.0   SPECGRAV 1.010   PROTEINUA Negative   GLUCUA Negative   KETONESU Negative   BILIRUBINUA Negative   OCCULTUA Negative   NITRITE Negative   UROBILINOGEN Negative   LEUKOCYTESUR Trace*   WBCUA 1   BACTERIA Rare   SQUAMEPITHEL 2       Significant Imaging: I have reviewed all pertinent imaging results/findings within the past 24 hours.

## 2023-02-24 NOTE — SUBJECTIVE & OBJECTIVE
"Past Medical History:   Diagnosis Date    ADHD (attention deficit hyperactivity disorder)     Arthritis     Asthma     Bipolar 1 disorder     Cataract     Cigarette smoker     COPD (chronic obstructive pulmonary disease)     Coronary artery disease     A fib    Depression     bipolar manic depresson    Diabetes mellitus     Diabetic foot ulcers     Diabetic neuropathy     DVT of lower extremity, bilateral 07/2013    bilateral LE DVT. [place] filter placed.     Encounter for blood transfusion     History of blood clots 1. Left Leg=2003; 2.Bilateral Groin=Blood Clots= 5 or 6/ 2013 & 7/2013; 3. LLL of Lung=7/2013;  4. Lt. Lower Leg=7/2013.     Pt. had 1st Blood Clot after Pzsmyeodxgvy=5717, & Last=2013. [place] Filter= Rt.Lateral Neck.    HTN (hypertension) 06/06/2013    Pt states that she does not have hypertension    Hypercholesteremia     Irregular heartbeat     Neuromuscular disorder     neuropathy feet    Obese     PE (pulmonary embolism) 07/2013    bilat LE DVT.     Restless leg syndrome        Past Surgical History:   Procedure Laterality Date    ABDOMINAL SURGERY  2010    gastric sleeve    BILATERAL OOPHORECTOMY Bilateral 1/12/2015    CHOLECYSTECTOMY      DEBRIDEMENT OF FOOT Bilateral 5/10/2022    Procedure: DEBRIDEMENT, FOOT;  Surgeon: Maira De Los Santos DPM;  Location: [facility] OR;  Service: Podiatry;  Laterality: Bilateral;    Green' s filter Right 7/4/2012    Right Neck & Tunneled Down.    HERNIA REPAIR      "[place] of Hernias Repaires around th Belly Button.", pt. states    INCISION AND DRAINAGE FOOT Left 12/24/2022    Procedure: INCISION AND DRAINAGE, FOOT;  Surgeon: Fahad Razo DPM;  Location: [facility] OR;  Service: Podiatry;  Laterality: Left;    LAPAROSCOPIC CHOLECYSTECTOMY N/A 9/10/2020    Procedure: CHOLECYSTECTOMY, LAPAROSCOPIC;  Surgeon: Montrell Gutierrez MD;  Location: [facility] OR;  Service: General;  Laterality: N/A;  RN PREOP 9/9----COVID Negative  9/9    OVARIAN CYST REMOVAL  3/13/2014    MO REMOVAL OF " OVARY/TUBE(S)      SPLENECTOMY, TOTAL  July 2003    TONSILLECTOMY      as a child    TYMPANOSTOMY TUBE PLACEMENT  1976    VEIN SURGERY  2003    Lt leg       Review of patient's allergies indicates:   Allergen Reactions    Morphine Other (See Comments)     Patient had a psychotic episode after taking Morphine  Agitation, hallucinations    Penicillins Anaphylaxis     Tolerated cephalosporins in the past    Januvia [sitagliptin] Hives    Carbamazepine Other (See Comments)     hyponatremia       No current facility-administered medications on file prior to encounter.     Current Outpatient Medications on File Prior to Encounter   Medication Sig    acetaminophen (TYLENOL) 500 MG tablet Take 2 tablets (1,000 mg total) by mouth every 6 (six) hours as needed for Pain.    albuterol (PROVENTIL/VENTOLIN HFA) 90 mcg/actuation inhaler INHALE 2 PUFFS INTO THE LUNGS EVERY 6 HOURS AS NEEDED FOR WHEEZING. RESCUE    apixaban (ELIQUIS) 5 mg Tab Take 1 tablet (5 mg total) by mouth 2 (two) times daily.    aspirin 81 MG Chew Take 1 tablet (81 mg total) by mouth once daily.    bumetanide (BUMEX) 1 MG tablet Take 1 tablet (1 mg total) by mouth once daily.    divalproex (DEPAKOTE) 500 MG TbEC Take 1 tablet (500 mg total) by mouth once daily. PO QAM    ferrous sulfate 325 (65 FE) MG EC tablet Take 1 tablet (325 mg total) by mouth once daily.    hydrOXYzine (ATARAX) 50 MG tablet Take 0.5 tablets (25 mg total) by mouth 4 (four) times daily as needed for Itching or Anxiety.    lisinopriL 10 MG tablet Take 1 tablet (10 mg total) by mouth once daily.    loratadine (CLARITIN) 10 mg tablet Take 1 tablet (10 mg total) by mouth once daily.    metFORMIN (GLUCOPHAGE) 1000 MG tablet Take 1 tablet (1,000 mg total) by mouth 2 (two) times daily with meals.    metoprolol tartrate (LOPRESSOR) 25 MG tablet Take 1 tablet (25 mg total) by mouth 2 (two) times daily.    multivitamin Tab Take 1 tablet by mouth once daily.    pantoprazole (PROTONIX) 40 MG tablet  Take 1 tablet (40 mg total) by mouth once daily.    pravastatin (PRAVACHOL) 40 MG tablet Take 1 tablet (40 mg total) by mouth every evening.    risperiDONE (RISPERDAL M-TABS) 3 MG disintegrating tablet Take 1 tablet (3 mg total) by mouth 2 (two) times daily. (Patient taking differently: Take 3 mg by mouth nightly.)    semaglutide (OZEMPIC) 1 mg/dose (4 mg/3 mL) Inject 1 mg into the skin every 7 days.    VYVANSE 40 mg Cap Take 40 mg by mouth once daily.    albuterol-ipratropium (DUO-NEB) 2.5 mg-0.5 mg/3 mL nebulizer solution Take 3 mLs by nebulization every 6 (six) hours as needed for Wheezing or Shortness of Breath. Rescue    ammonium lactate (LAC-HYDRIN) 12 % lotion APPL Y ONCE TOPICALLY TWICE DAILY FOR 30 DAYS    DUPIXENT  mg/2 mL PnIj Inject into the skin.    fluticasone propionate (FLONASE) 50 mcg/actuation nasal spray 2 sprays (100 mcg total) by Each Nostril route daily as needed (Nasal congestion).    fluticasone-salmeterol diskus inhaler 250-50 mcg Inhale 1 puff into the lungs 2 (two) times daily. Controller    insulin detemir U-100 (LEVEMIR FLEXTOUCH) 100 unit/mL (3 mL) SubQ InPn pen Inject 22 Units into the skin every evening.    LIDOcaine (LIDODERM) 5 % Place 1 patch onto the skin once daily. Remove & Discard patch within 12 hours or as directed by MD    magnesium hydroxide 400 mg/5 ml (MILK OF MAGNESIA) 400 mg/5 mL Susp Take 30 mLs (2,400 mg total) by mouth daily as needed.    methocarbamoL (ROBAXIN) 500 MG Tab Take 500 mg by mouth. Frequency could not be confirmed.    nystatin (NYSTOP) powder APPLY TO ABDOMINAL AND BREAST SKIN FOLD TWICE DAILY.    oxyCODONE-acetaminophen (PERCOCET)  mg per tablet Take 1 tablet by mouth every 6 (six) hours as needed for Pain.    polyethylene glycol (GLYCOLAX) 17 gram PwPk Take 17 g by mouth once daily.    senna-docusate 8.6-50 mg (PERICOLACE) 8.6-50 mg per tablet Take 1 tablet by mouth 2 (two) times daily.    [DISCONTINUED] diclofenac sodium (VOLTAREN) 1 %  Gel Apply 2 g topically 4 (four) times daily as needed (Apply to painful area up to 4 times a day as needed for pain). Apply to painful area 4 times a day as needed for pain    [DISCONTINUED] furosemide (LASIX) 20 MG tablet TAKE 1 TABLET(20 MG) BY MOUTH EVERY DAY    [DISCONTINUED] QUEtiapine (SEROQUEL) 200 MG Tab Take 1 tablet (200 mg total) by mouth before breakfast.     Family History       Problem Relation (Age of Onset)    Cataracts Father    Diabetes Father, Paternal Grandfather    Heart disease Father, Paternal Grandfather    Hypertension Father    No Known Problems Mother, Sister, Brother, Maternal Aunt, Maternal Uncle, Paternal Aunt, Paternal Uncle, Maternal Grandfather    Ovarian cancer Maternal Grandmother, Paternal Grandmother          Tobacco Use    Smoking status: Every Day     Packs/day: 1.00     Years: 37.00     Pack years: 37.00     Types: Cigarettes     Last attempt to quit: 2020     Years since quittin.2    Smokeless tobacco: Current    Tobacco comments:     Enrolled in the [employer] on 5/3/14 ([facility] Member ID # [id number]). Ambulatory referral to Smoking Cessation Program   Substance and Sexual Activity    Alcohol use: No     Alcohol/week: 0.0 standard drinks    Drug use: No    Sexual activity: Yes     Partners: Male     Review of Systems   Constitutional:  Negative for chills, fatigue and fever.   HENT:  Negative for congestion and rhinorrhea.    Eyes:  Negative for photophobia and visual disturbance.   Respiratory:  Negative for cough and shortness of breath.    Cardiovascular:  Positive for leg swelling. Negative for chest pain and palpitations.   Gastrointestinal:  Negative for abdominal pain, diarrhea, nausea and vomiting.   Genitourinary:  Negative for dysuria, frequency and urgency.   Musculoskeletal:  Positive for arthralgias and myalgias.   Skin:  Positive for color change and wound. Negative for pallor and rash.   Neurological:  Negative for light-headedness and headaches.    Psychiatric/Behavioral:  Negative for confusion and decreased concentration.    Objective:     Vital Signs (Most Recent):  Temp: 98.5 °F (36.9 °C) (02/24/23 1158)  Pulse: 68 (02/24/23 1158)  Resp: 18 (02/24/23 1158)  BP: (!) 119/58 (02/24/23 1158)  SpO2: (!) 93 % (02/24/23 1158)   Vital Signs (24h Range):  Temp:  [97.9 °F (36.6 °C)-98.7 °F (37.1 °C)] 98.5 °F (36.9 °C)  Pulse:  [65-90] 68  Resp:  [0-28] 18  SpO2:  [87 %-97 %] 93 %  BP: (103-166)/(53-78) 119/58     Weight: 106.6 kg (235 lb)  Body mass index is 37.93 kg/m².    Physical Exam  Vitals and nursing note reviewed.   Constitutional:       General: She is not in acute distress.     Appearance: She is well-developed.   HENT:      Head: Normocephalic and atraumatic.      Right Ear: External ear normal.      Left Ear: External ear normal.      Nose: Nose normal.   Eyes:      Conjunctiva/sclera: Conjunctivae normal.      Pupils: Pupils are equal, round, and reactive to light.   Cardiovascular:      Rate and Rhythm: Normal rate and regular rhythm.   Pulmonary:      Effort: Pulmonary effort is normal. No respiratory distress.      Breath sounds: Normal breath sounds. No wheezing.   Abdominal:      General: Bowel sounds are normal. There is no distension.      Palpations: Abdomen is soft.      Tenderness: There is no abdominal tenderness.      Comments: No palpable hepatomegaly or splenomegaly    Musculoskeletal:         General: No tenderness. Normal range of motion.      Cervical back: Normal range of motion and neck supple.      Comments: Dressing c/d/i   Skin:     General: Skin is warm and dry.   Neurological:      Mental Status: She is alert and oriented to person, place, and time.   Psychiatric:         Thought Content: Thought content normal.         CRANIAL NERVES     CN III, IV, VI   Pupils are equal, round, and reactive to light.     Significant Labs: All pertinent labs within the past 24 hours have been reviewed.    Significant Imaging: I have reviewed  all pertinent imaging results/findings within the past 24 hours.

## 2023-02-24 NOTE — CONSULTS
"[facility] - [facility] Surg  Infectious Disease  Consult Note    Patient Name: Audrey Natarajan  MRN: [id number]  Admission Date: 2/23/2023  Hospital Length of Stay: 0 days  Attending Physician: Micha Grossman MD  Primary Care Provider: Donaldo Pena MD     Isolation Status: No active isolations    Patient information was obtained from patient and ER records.      Consults  Assessment/Plan:     Orthopedic  Charcot's joint of foot  51y/o M with h/o DM with charcot foot, DVT on Eliquis, PAD, ongoing tobacco abuse admitted 2/23 with chronic L foot wound and worsening pain/swelling. Notably just completed 4 weeks vancomycin at LTAC for possible (note prior bone biopsy path/cultures 1/13 negative for OM). LLE Dvt studies negative. S/p podiatry debridement at bedside today- gram stain negative. Currently on vancomycin. ID consulted for "foot wound, leukocytosis, elevated lactic"    Not clear that infection is playing role in chronic foot ulcer. Likely poor wound healing from combination from  DM, ongoing tobacco abuse, PAD and psychosocial factors    Recommendations:   - if podiatry concerned for infection/OM, would consider bone biopsy for path/culture  - hold antibiotics to increase yield of culture  - defer need for amputation to podiatry, but would likely be definitive treatment of infection  - counseled on need for tobacco cessation               Thank you for your consult. I will follow-up with patient. Please contact us if you have any additional questions.    Beverly Zavala MD  Infectious Disease  [place] - Med Surg    Subjective:     Principal Problem: Cellulitis    HPI:   51y/o M with h/o DM with charcot foot, DVT on Eliquis, PAD, ongoing tobacco abuse admitted 2/23 with chronic L foot wound and worsening pain/swelling. Notably just completed 4 weeks vancomycin at LTAC for possible (note prior bone biopsy path/cultures 1/13 negative for OM). Wound improved and filling in since completing vancomycin. " "Reports using scooter to keep weight off of foot. Going to wound care. Denies f/c.     S/p podiatry debridement at bedside today    Currently on vancomycin      1 mo ago    Final Pathologic Diagnosis Bone, left foot, biopsy:   Viable bone with osteonecrosis-no acute osteomyelitis identified     ID consulted for "foot wound, leukocytosis, elevated lactic"             Past Medical History:   Diagnosis Date    ADHD (attention deficit hyperactivity disorder)     Arthritis     Asthma     Bipolar 1 disorder     Cataract     Cigarette smoker     COPD (chronic obstructive pulmonary disease)     Coronary artery disease     A fib    Depression     bipolar manic depresson    Diabetes mellitus     Diabetic foot ulcers     Diabetic neuropathy     DVT of lower extremity, bilateral 07/2013    bilateral LE DVT. Estelita filter placed.     Encounter for blood transfusion     History of blood clots 1. Left Leg=2003; 2.Bilateral Groin=Blood Clots= 5 or 6/ 2013 & 7/2013; 3. LLL of Lung=7/2013;  4. Lt. Lower Leg=7/2013.     Pt. had 1st Blood Clot after Hpyeyjheqdpl=5823, & Last=2013. Estelita Filter= Rt.Lateral Neck.    HTN (hypertension) 06/06/2013    Pt states that she does not have hypertension    Hypercholesteremia     Irregular heartbeat     Neuromuscular disorder     neuropathy feet    Obese     PE (pulmonary embolism) 07/2013    bilat LE DVT.     Restless leg syndrome        Past Surgical History:   Procedure Laterality Date    ABDOMINAL SURGERY  2010    gastric sleeve    BILATERAL OOPHORECTOMY Bilateral 1/12/2015    CHOLECYSTECTOMY      DEBRIDEMENT OF FOOT Bilateral 5/10/2022    Procedure: DEBRIDEMENT, FOOT;  Surgeon: Maira De Los Santos DPM;  [facility];  Service: Podiatry;  Laterality: Bilateral;    Green' s filter Right 7/4/2012    Right Neck & Tunneled Down.    HERNIA REPAIR      "[place] of Hernias Repaires around th [place]ly Button.", pt. states    INCISION AND DRAINAGE FOOT Left 12/24/2022    " Procedure: INCISION AND DRAINAGE, FOOT;  Surgeon: Fahad Razo DPM;  Location: [facility] OR;  Service: Podiatry;  Laterality: Left;    LAPAROSCOPIC CHOLECYSTECTOMY N/A 9/10/2020    Procedure: CHOLECYSTECTOMY, LAPAROSCOPIC;  Surgeon: Montrell Gutierrez MD;  Location: [facility] OR;  Service: General;  Laterality: N/A;  RN PREOP 9/9----COVID Negative  9/9    OVARIAN CYST REMOVAL  3/13/2014    MT REMOVAL OF OVARY/TUBE(S)      SPLENECTOMY, TOTAL  July 2003    TONSILLECTOMY      as a child    TYMPANOSTOMY TUBE PLACEMENT  1976    VEIN SURGERY  2003    Lt leg       Review of patient's allergies indicates:   Allergen Reactions    Morphine Other (See Comments)     Patient had a psychotic episode after taking Morphine  Agitation, hallucinations    Penicillins Anaphylaxis     Tolerated cephalosporins in the past    Januvia [sitagliptin] Hives    Carbamazepine Other (See Comments)     hyponatremia       No current facility-administered medications on file prior to encounter.     Current Outpatient Medications on File Prior to Encounter   Medication Sig    acetaminophen (TYLENOL) 500 MG tablet Take 2 tablets (1,000 mg total) by mouth every 6 (six) hours as needed for Pain.    albuterol (PROVENTIL/VENTOLIN HFA) 90 mcg/actuation inhaler INHALE 2 PUFFS INTO THE LUNGS EVERY 6 HOURS AS NEEDED FOR WHEEZING. RESCUE    apixaban (ELIQUIS) 5 mg Tab Take 1 tablet (5 mg total) by mouth 2 (two) times daily.    aspirin 81 MG Chew Take 1 tablet (81 mg total) by mouth once daily.    bumetanide (BUMEX) 1 MG tablet Take 1 tablet (1 mg total) by mouth once daily.    divalproex (DEPAKOTE) 500 MG TbEC Take 1 tablet (500 mg total) by mouth once daily. PO QAM    ferrous sulfate 325 (65 FE) MG EC tablet Take 1 tablet (325 mg total) by mouth once daily.    hydrOXYzine (ATARAX) 50 MG tablet Take 0.5 tablets (25 mg total) by mouth 4 (four) times daily as needed for Itching or Anxiety.    lisinopriL 10 MG tablet Take 1 tablet (10 mg total) by mouth  once daily.    loratadine (CLARITIN) 10 mg tablet Take 1 tablet (10 mg total) by mouth once daily.    metFORMIN (GLUCOPHAGE) 1000 MG tablet Take 1 tablet (1,000 mg total) by mouth 2 (two) times daily with meals.    metoprolol tartrate (LOPRESSOR) 25 MG tablet Take 1 tablet (25 mg total) by mouth 2 (two) times daily.    multivitamin Tab Take 1 tablet by mouth once daily.    pantoprazole (PROTONIX) 40 MG tablet Take 1 tablet (40 mg total) by mouth once daily.    pravastatin (PRAVACHOL) 40 MG tablet Take 1 tablet (40 mg total) by mouth every evening.    risperiDONE (RISPERDAL M-TABS) 3 MG disintegrating tablet Take 1 tablet (3 mg total) by mouth 2 (two) times daily. (Patient taking differently: Take 3 mg by mouth nightly.)    semaglutide (OZEMPIC) 1 mg/dose (4 mg/3 mL) Inject 1 mg into the skin every 7 days.    VYVANSE 40 mg Cap Take 40 mg by mouth once daily.    albuterol-ipratropium (DUO-NEB) 2.5 mg-0.5 mg/3 mL nebulizer solution Take 3 mLs by nebulization every 6 (six) hours as needed for Wheezing or Shortness of Breath. Rescue    ammonium lactate (LAC-HYDRIN) 12 % lotion APPL Y ONCE TOPICALLY TWICE DAILY FOR 30 DAYS    DUPIXENT  mg/2 mL PnIj Inject into the skin.    fluticasone propionate (FLONASE) 50 mcg/actuation nasal spray 2 sprays (100 mcg total) by Each Nostril route daily as needed (Nasal congestion).    fluticasone-salmeterol diskus inhaler 250-50 mcg Inhale 1 puff into the lungs 2 (two) times daily. Controller    insulin detemir U-100 (LEVEMIR FLEXTOUCH) 100 unit/mL (3 mL) SubQ InPn pen Inject 22 Units into the skin every evening.    LIDOcaine (LIDODERM) 5 % Place 1 patch onto the skin once daily. Remove & Discard patch within 12 hours or as directed by MD    magnesium hydroxide 400 mg/5 ml (MILK OF MAGNESIA) 400 mg/5 mL Susp Take 30 mLs (2,400 mg total) by mouth daily as needed.    methocarbamoL (ROBAXIN) 500 MG Tab Take 500 mg by mouth. Frequency could not be confirmed.     nystatin (NYSTOP) powder APPLY TO ABDOMINAL AND BREAST SKIN FOLD TWICE DAILY.    oxyCODONE-acetaminophen (PERCOCET)  mg per tablet Take 1 tablet by mouth every 6 (six) hours as needed for Pain.    polyethylene glycol (GLYCOLAX) 17 gram PwPk Take 17 g by mouth once daily.    senna-docusate 8.6-50 mg (PERICOLACE) 8.6-50 mg per tablet Take 1 tablet by mouth 2 (two) times daily.    [DISCONTINUED] diclofenac sodium (VOLTAREN) 1 % Gel Apply 2 g topically 4 (four) times daily as needed (Apply to painful area up to 4 times a day as needed for pain). Apply to painful area 4 times a day as needed for pain    [DISCONTINUED] furosemide (LASIX) 20 MG tablet TAKE 1 TABLET(20 MG) BY MOUTH EVERY DAY    [DISCONTINUED] QUEtiapine (SEROQUEL) 200 MG Tab Take 1 tablet (200 mg total) by mouth before breakfast.     Family History       Problem Relation (Age of Onset)    Cataracts Father    Diabetes Father, Paternal Grandfather    Heart disease Father, Paternal Grandfather    Hypertension Father    No Known Problems Mother, Sister, Brother, Maternal Aunt, Maternal Uncle, Paternal Aunt, Paternal Uncle, Maternal Grandfather    Ovarian cancer Maternal Grandmother, Paternal Grandmother          Tobacco Use    Smoking status: Every Day     Packs/day: 1.00     Years: 37.00     Pack years: 37.00     Types: Cigarettes     Last attempt to quit: 2020     Years since quittin.2    Smokeless tobacco: Current    Tobacco comments:     Enrolled in the [employer] on 5/3/14 ([facility] Member ID # [id number]). Ambulatory referral to Smoking Cessation Program   Substance and Sexual Activity    Alcohol use: No     Alcohol/week: 0.0 standard drinks    Drug use: No    Sexual activity: Yes     Partners: Male     Review of Systems   Constitutional:  Negative for chills, fatigue and fever.   HENT:  Negative for congestion and rhinorrhea.    Eyes:  Negative for photophobia and visual disturbance.   Respiratory:  Negative for cough and  shortness of breath.    Cardiovascular:  Positive for leg swelling. Negative for chest pain and palpitations.   Gastrointestinal:  Negative for abdominal pain, diarrhea, nausea and vomiting.   Genitourinary:  Negative for dysuria, frequency and urgency.   Musculoskeletal:  Positive for arthralgias and myalgias.   Skin:  Positive for color change and wound. Negative for pallor and rash.   Neurological:  Negative for light-headedness and headaches.   Psychiatric/Behavioral:  Negative for confusion and decreased concentration.    Objective:     Vital Signs (Most Recent):  Temp: 98.5 °F (36.9 °C) (02/24/23 1158)  Pulse: 68 (02/24/23 1158)  Resp: 18 (02/24/23 1158)  BP: (!) 119/58 (02/24/23 1158)  SpO2: (!) 93 % (02/24/23 1158)   Vital Signs (24h Range):  Temp:  [97.9 °F (36.6 °C)-98.7 °F (37.1 °C)] 98.5 °F (36.9 °C)  Pulse:  [65-90] 68  Resp:  [0-28] 18  SpO2:  [87 %-97 %] 93 %  BP: (103-166)/(53-78) 119/58     Weight: 106.6 kg (235 lb)  Body mass index is 37.93 kg/m².    Physical Exam  Vitals and nursing note reviewed.   Constitutional:       General: She is not in acute distress.     Appearance: She is well-developed.   HENT:      Head: Normocephalic and atraumatic.      Right Ear: External ear normal.      Left Ear: External ear normal.      Nose: Nose normal.   Eyes:      Conjunctiva/sclera: Conjunctivae normal.      Pupils: Pupils are equal, round, and reactive to light.   Cardiovascular:      Rate and Rhythm: Normal rate and regular rhythm.   Pulmonary:      Effort: Pulmonary effort is normal. No respiratory distress.      Breath sounds: Normal breath sounds. No wheezing.   Abdominal:      General: Bowel sounds are normal. There is no distension.      Palpations: Abdomen is soft.      Tenderness: There is no abdominal tenderness.      Comments: No palpable hepatomegaly or splenomegaly    Musculoskeletal:         General: No tenderness. Normal range of motion.      Cervical back: Normal range of motion and neck  supple.      Comments: Dressing c/d/i   Skin:     General: Skin is warm and dry.   Neurological:      Mental Status: She is alert and oriented to person, place, and time.   Psychiatric:         Thought Content: Thought content normal.         CRANIAL NERVES     CN III, IV, VI   Pupils are equal, round, and reactive to light.     Significant Labs: All pertinent labs within the past 24 hours have been reviewed.    Significant Imaging: I have reviewed all pertinent imaging results/findings within the past 24 hours.

## 2023-02-24 NOTE — ASSESSMENT & PLAN NOTE
5 minutes spent counseling the patient on smoking cessation and she is not currently ready to stop smoking. She will be monitored for withdrawal.  She will be provided with additional smoking cessation counseling prior to discharge.

## 2023-02-24 NOTE — ASSESSMENT & PLAN NOTE
"Erythema, edema, with worsening pain.  Leukocytosis and elevated lactic acid elevated inflammatory markers on lab work.  Blood cultures are pending.  Continue IV antibiotics.  Podiatry and ID consulted.    02/24: Leukocytosis resolved; s/p bedside debridement by Podiatry; MRI foot shows "Similar appearance of extensive underlying Charcot changes at the midfoot and metatarsal bases with concern for osteomyelitis at the inferior cuboid." Cultures obtained and pending. ID recommending bone biopsy and holding abx to increase yield of culture.   "

## 2023-02-24 NOTE — PHARMACY MED REC
"Admission Medication History     The home medication history was taken by Erlinda Cervantes.    You may go to "Admission" then "Reconcile Home Medications" tabs to review and/or act upon these items.     The home medication list has been updated by the Pharmacy department.   Please read ALL comments highlighted in yellow.   Please address this information as you see fit.    Feel free to contact us if you have any questions or require assistance.    Medications listed below were obtained from: Patient/family and Analytic software- [employer]  (Not in a hospital admission)          Erlinda Cervantes  [phone]                 .        "

## 2023-02-24 NOTE — ASSESSMENT & PLAN NOTE
Patient's FSGs are controlled on current medication regimen.  Last A1c reviewed-   Lab Results   Component Value Date    HGBA1C 7.1 (H) 12/22/2022     Most recent fingerstick glucose reviewed-   Recent Labs   Lab 02/23/23  1948 02/23/23  2245 02/24/23  0239 02/24/23  1158   POCTGLUCOSE 135* 248* 145* 184*     Current correctional scale  Medium  Maintain anti-hyperglycemic dose as follows-   Antihyperglycemics (From admission, onward)    Start     Stop Route Frequency Ordered    02/23/23 2330  insulin detemir U-100 pen 15 Units         -- SubQ Nightly 02/23/23 2229    02/23/23 2328  insulin aspart U-100 pen 1-10 Units         -- SubQ Before meals & nightly PRN 02/23/23 2229      Diabetic Diet  Hold Oral hypoglycemics while patient is in the hospital.

## 2023-02-24 NOTE — HOSPITAL COURSE
Osteomyelitis of left foot  Erythema, edema, with worsening pain.     - wound culture Pseudomonas   - Podiatry and ID consulted.  - to OR 2/28 for debridement. Culture pending  - ID consulted- started vanc and cefepime post operatively   - PICC line placed prior to dc   - PT, OT- no weight bearing L foot         Charcot's joint of foot  Noted risk factor for wounds        SHAWN (obstructive sleep apnea)  CPAP QHS           Bipolar 1 disorder  Continue home regimen of risperidone     History of pulmonary embolus (PE)  History of PE/DVT- resumed eliquis        History of DVT (deep vein thrombosis)  Resumed eliquis     Tobacco abuse  5 minutes spent counseling the patient on smoking cessation and she is not currently ready to stop smoking. She will be monitored for withdrawal.  She will be provided with additional smoking cessation counseling prior to discharge.     Hyperlipidemia  No recent lipid panel to review- repeat by PCP as outpatient  Continue statin     COPD (chronic obstructive pulmonary disease)  No signs of acute exacerbation, stable on room air  P.r.n. nebs     Essential hypertension  BP is controlled  Continue home lisinopril and metoprolol  Bumex held- resume prior to dc

## 2023-02-24 NOTE — H&P
[facility] Emergency [facility]  [facility] Medicine  History & Physical    Patient Name: Audrey Natarajan  MRN: [id number]  Patient Class: OP- Observation  Admission Date: 2/23/2023  Attending Physician: Micha Grossman MD   Primary Care Provider: Donaldo Pena MD         Patient information was obtained from patient, past medical records and ER records.     Subjective:     Principal Problem:Cellulitis    Chief Complaint:   Chief Complaint   Patient presents with    Leg Pain     51 yo female to triage for left leg pain and swelling from foot to groin. Pt says she's scheduled for an ultrasound to rule out DVT and to check for PVD but it isn't until March. Pt hardly able to walk w/ the pain. Sitting in personal wheelchair at this time. Pt denies SOB and CP.     Leg Swelling        HPI: 52 y.o. female with hypertension, hyperlipidemia, COPD, bipolar 1 disorder, DM2 presents with a complaint of foot pain.  Appears to be a chronic intermittent problem for some time, associated with erythema and edema of the extremity.  Was on IV vancomycin for awhile.  Has home health wound care.  Sees Podiatry Dr. De Los Santos and Infectious Disease Dr. Jarocho panchal.  Denies fever, chills, cough, SOB, chest pain, palpitations, dizziness, syncope, nausea, vomiting, diarrhea, abdominal pain.  In the ED, she was found to have leukocytosis with elevated lactic acid.  Inflammatory markers are also elevated.  X-ray of the foot an ultrasound of the veins of the lower extremity were unremarkable for acute abnormality.  Blood cultures were obtained and she was started on IV vancomycin in the ED.  Placed in observation.      Past Medical History:   Diagnosis Date    ADHD (attention deficit hyperactivity disorder)     Arthritis     Asthma     Bipolar 1 disorder     Cataract     Cigarette smoker     COPD (chronic obstructive pulmonary disease)     Coronary artery disease     A fib    Depression     bipolar manic depresson    Diabetes mellitus   "   Diabetic foot ulcers     Diabetic neuropathy     DVT of lower extremity, bilateral 07/2013    bilateral LE DVT. Harrison filter placed.     Encounter for blood transfusion     History of blood clots 1. Left Leg=2003; 2.Bilateral Groin=Blood Clots= 5 or 6/ 2013 & 7/2013; 3. LLL of Lung=7/2013;  4. Lt. Lower Leg=7/2013.     Pt. had 1st Blood Clot after [id number]=1637, & Last=2013. Estelita Filter= Rt.Lateral Neck.    HTN (hypertension) 06/06/2013    Pt states that she does not have hypertension    Hypercholesteremia     Irregular heartbeat     Neuromuscular disorder     neuropathy feet    Obese     PE (pulmonary embolism) 07/2013    bilat LE DVT.     Restless leg syndrome        Past Surgical History:   Procedure Laterality Date    ABDOMINAL SURGERY  2010    gastric sleeve    BILATERAL OOPHORECTOMY Bilateral 1/12/2015    CHOLECYSTECTOMY      DEBRIDEMENT OF FOOT Bilateral 5/10/2022    Procedure: DEBRIDEMENT, FOOT;  Surgeon: Maira De Los Santos DPM;  Location: [facility] OR;  Service: Podiatry;  Laterality: Bilateral;    Green' s filter Right 7/4/2012    Right Neck & Tunneled Down.    HERNIA REPAIR      "[place] of Hernias Repaires around th [place].", pt. states    INCISION AND DRAINAGE FOOT Left 12/24/2022    Procedure: INCISION AND DRAINAGE, FOOT;  Surgeon: Fahad Razo DPM;  Location: [facility] OR;  Service: Podiatry;  Laterality: Left;    LAPAROSCOPIC CHOLECYSTECTOMY N/A 9/10/2020    Procedure: CHOLECYSTECTOMY, LAPAROSCOPIC;  Surgeon: Montrell Gutierrez MD;  Location: [facility] OR;  Service: General;  Laterality: N/A;  RN PREOP 9/9----COVID Negative  9/9    OVARIAN CYST REMOVAL  3/13/2014    [place] REMOVAL OF OVARY/TUBE(S)      SPLENECTOMY, TOTAL  July 2003    TONSILLECTOMY      as a child    TYMPANOSTOMY TUBE PLACEMENT  1976    VEIN SURGERY  2003    Lt leg       Review of patient's allergies indicates:   Allergen Reactions    Morphine Other (See Comments)     Patient had a psychotic episode after " taking Morphine  Agitation, hallucinations    Penicillins Anaphylaxis     Tolerated cephalosporins in the past    Januvia [sitagliptin] Hives    Carbamazepine Other (See Comments)     hyponatremia       No current facility-administered medications on file prior to encounter.     Current Outpatient Medications on File Prior to Encounter   Medication Sig    acetaminophen (TYLENOL) 500 MG tablet Take 2 tablets (1,000 mg total) by mouth every 6 (six) hours as needed for Pain.    albuterol (PROVENTIL/VENTOLIN HFA) 90 mcg/actuation inhaler INHALE 2 PUFFS INTO THE LUNGS EVERY 6 HOURS AS NEEDED FOR WHEEZING. RESCUE    apixaban (ELIQUIS) 5 mg Tab Take 1 tablet (5 mg total) by mouth 2 (two) times daily.    aspirin 81 MG Chew Take 1 tablet (81 mg total) by mouth once daily.    bumetanide (BUMEX) 1 MG tablet Take 1 tablet (1 mg total) by mouth once daily.    divalproex (DEPAKOTE) 500 MG TbEC Take 1 tablet (500 mg total) by mouth once daily. PO QAM    ferrous sulfate 325 (65 FE) MG EC tablet Take 1 tablet (325 mg total) by mouth once daily.    hydrOXYzine (ATARAX) 50 MG tablet Take 0.5 tablets (25 mg total) by mouth 4 (four) times daily as needed for Itching or Anxiety.    lisinopriL 10 MG tablet Take 1 tablet (10 mg total) by mouth once daily.    loratadine (CLARITIN) 10 mg tablet Take 1 tablet (10 mg total) by mouth once daily.    metFORMIN (GLUCOPHAGE) 1000 MG tablet Take 1 tablet (1,000 mg total) by mouth 2 (two) times daily with meals.    metoprolol tartrate (LOPRESSOR) 25 MG tablet Take 1 tablet (25 mg total) by mouth 2 (two) times daily.    multivitamin Tab Take 1 tablet by mouth once daily.    pantoprazole (PROTONIX) 40 MG tablet Take 1 tablet (40 mg total) by mouth once daily.    pravastatin (PRAVACHOL) 40 MG tablet Take 1 tablet (40 mg total) by mouth every evening.    risperiDONE (RISPERDAL M-TABS) 3 MG disintegrating tablet Take 1 tablet (3 mg total) by mouth 2 (two) times daily. (Patient taking  differently: Take 3 mg by mouth nightly.)    semaglutide (OZEMPIC) 1 mg/dose (4 mg/3 mL) Inject 1 mg into the skin every 7 days.    VYVANSE 40 mg Cap Take 40 mg by mouth once daily.    albuterol-ipratropium (DUO-NEB) 2.5 mg-0.5 mg/3 mL nebulizer solution Take 3 mLs by nebulization every 6 (six) hours as needed for Wheezing or Shortness of Breath. Rescue    ammonium lactate (LAC-HYDRIN) 12 % lotion APPL Y ONCE TOPICALLY TWICE DAILY FOR 30 DAYS    DUPIXENT  mg/2 mL PnIj Inject into the skin.    fluticasone propionate (FLONASE) 50 mcg/actuation nasal spray 2 sprays (100 mcg total) by Each Nostril route daily as needed (Nasal congestion).    fluticasone-salmeterol diskus inhaler 250-50 mcg Inhale 1 puff into the lungs 2 (two) times daily. Controller    insulin detemir U-100 (LEVEMIR FLEXTOUCH) 100 unit/mL (3 mL) SubQ InPn pen Inject 22 Units into the skin every evening.    LIDOcaine (LIDODERM) 5 % Place 1 patch onto the skin once daily. Remove & Discard patch within 12 hours or as directed by MD    magnesium hydroxide 400 mg/5 ml (MILK OF MAGNESIA) 400 mg/5 mL Susp Take 30 mLs (2,400 mg total) by mouth daily as needed.    methocarbamoL (ROBAXIN) 500 MG Tab Take 500 mg by mouth. Frequency could not be confirmed.    nystatin (NYSTOP) powder APPLY TO ABDOMINAL AND BREAST SKIN FOLD TWICE DAILY.    oxyCODONE-acetaminophen (PERCOCET)  mg per tablet Take 1 tablet by mouth every 6 (six) hours as needed for Pain.    polyethylene glycol (GLYCOLAX) 17 gram PwPk Take 17 g by mouth once daily.    senna-docusate 8.6-50 mg (PERICOLACE) 8.6-50 mg per tablet Take 1 tablet by mouth 2 (two) times daily.    [DISCONTINUED] diclofenac sodium (VOLTAREN) 1 % Gel Apply 2 g topically 4 (four) times daily as needed (Apply to painful area up to 4 times a day as needed for pain). Apply to painful area 4 times a day as needed for pain    [DISCONTINUED] furosemide (LASIX) 20 MG tablet TAKE 1 TABLET(20 MG) BY MOUTH EVERY  DAY    [DISCONTINUED] QUEtiapine (SEROQUEL) 200 MG Tab Take 1 tablet (200 mg total) by mouth before breakfast.     Family History       Problem Relation (Age of Onset)    Cataracts Father    Diabetes Father, Paternal Grandfather    Heart disease Father, Paternal Grandfather    Hypertension Father    No Known Problems Mother, Sister, Brother, Maternal Aunt, Maternal Uncle, Paternal Aunt, Paternal Uncle, Maternal Grandfather    Ovarian cancer Maternal Grandmother, Paternal Grandmother          Tobacco Use    Smoking status: Every Day     Packs/day: 1.00     Years: 37.00     Pack years: 37.00     Types: Cigarettes     Last attempt to quit: 2020     Years since quittin.2    Smokeless tobacco: Current    Tobacco comments:     Enrolled in the [employer] on 5/3/14 ([facility] Member ID # [id number]). Ambulatory referral to Smoking Cessation Program   Substance and Sexual Activity    Alcohol use: No     Alcohol/week: 0.0 standard drinks    Drug use: No    Sexual activity: Yes     Partners: Male     Review of Systems   Constitutional:  Negative for chills, fatigue and fever.   HENT:  Negative for congestion and rhinorrhea.    Eyes:  Negative for photophobia and visual disturbance.   Respiratory:  Negative for cough and shortness of breath.    Cardiovascular:  Positive for leg swelling. Negative for chest pain and palpitations.   Gastrointestinal:  Negative for abdominal pain, diarrhea, nausea and vomiting.   Genitourinary:  Negative for dysuria, frequency and urgency.   Musculoskeletal:  Positive for arthralgias and myalgias.   Skin:  Positive for color change and wound. Negative for pallor and rash.   Neurological:  Negative for light-headedness and headaches.   Psychiatric/Behavioral:  Negative for confusion and decreased concentration.    Objective:     Vital Signs (Most Recent):  Temp: 98.5 °F (36.9 °C) (23 1545)  Pulse: 74 (23 2133)  Resp: 15 (23 2247)  BP: (!) 125/58 (23  2133)  SpO2: 95 % (02/23/23 2133)   Vital Signs (24h Range):  Temp:  [98.5 °F (36.9 °C)-98.7 °F (37.1 °C)] 98.5 °F (36.9 °C)  Pulse:  [74-90] 74  Resp:  [15-28] 15  SpO2:  [93 %-97 %] 95 %  BP: (125-166)/(58-78) 125/58     Weight: 106.6 kg (235 lb)  Body mass index is 37.93 kg/m².    Physical Exam  Vitals and nursing note reviewed.   Constitutional:       General: She is not in acute distress.     Appearance: She is well-developed.   HENT:      Head: Normocephalic and atraumatic.      Right Ear: External ear normal.      Left Ear: External ear normal.      Nose: Nose normal.   Eyes:      Conjunctiva/sclera: Conjunctivae normal.      Pupils: Pupils are equal, round, and reactive to light.   Cardiovascular:      Rate and Rhythm: Normal rate and regular rhythm.   Pulmonary:      Effort: Pulmonary effort is normal. No respiratory distress.      Breath sounds: Normal breath sounds. No wheezing.   Abdominal:      General: Bowel sounds are normal. There is no distension.      Palpations: Abdomen is soft.      Tenderness: There is no abdominal tenderness.      Comments: No palpable hepatomegaly or splenomegaly    Musculoskeletal:         General: No tenderness. Normal range of motion.      Cervical back: Normal range of motion and neck supple.      Comments: Dressing c/d/i   Skin:     General: Skin is warm and dry.   Neurological:      Mental Status: She is alert and oriented to person, place, and time.   Psychiatric:         Thought Content: Thought content normal.         CRANIAL NERVES     CN III, IV, VI   Pupils are equal, round, and reactive to light.     Significant Labs: All pertinent labs within the past 24 hours have been reviewed.    Significant Imaging: I have reviewed all pertinent imaging results/findings within the past 24 hours.    Assessment/Plan:     * Cellulitis  Erythema, edema, with worsening pain.  Leukocytosis and elevated lactic acid elevated inflammatory markers on lab work.  Blood cultures are  pending.  Continue IV antibiotics.  Podiatry and ID consulted.    Type 2 diabetes mellitus  Patient's FSGs are controlled on current medication regimen.  Last A1c reviewed-   Lab Results   Component Value Date    HGBA1C 7.1 (H) 12/22/2022     Most recent fingerstick glucose reviewed-   Recent Labs   Lab 02/23/23  1521 02/23/23  1948 02/23/23  2245   POCTGLUCOSE 174* 135* 248*     Current correctional scale  Medium  Maintain anti-hyperglycemic dose as follows-   Antihyperglycemics (From admission, onward)    Start     Stop Route Frequency Ordered    02/23/23 2330  insulin detemir U-100 pen 15 Units         -- SubQ Nightly 02/23/23 2229 02/23/23 2328  insulin aspart U-100 pen 1-10 Units         -- SubQ Before meals & nightly PRN 02/23/23 2229        Hold Oral hypoglycemics while patient is in the hospital.    Bipolar 1 disorder  Continue home regimen of risperidone    Hyperlipidemia  Continue statin    COPD (chronic obstructive pulmonary disease)  P.r.n. nebs    Essential hypertension  Well controlled, continue home medications and monitor blood pressure, adjust as needed.       VTE Risk Mitigation (From admission, onward)         Ordered     enoxaparin injection 40 mg  Daily         02/23/23 2229     IP VTE HIGH RISK PATIENT  Once         02/23/23 2229     Place sequential compression device  Until discontinued         02/23/23 2229               As clarification, on 02/23/2023, patient should be placed in hospital observation services under my care in collaboration with MD Oral Ayala Jr., APRN, AGACNP-BC  Hospitalist - Department of Hospital Medicine  [facility] [address] [place] [address]  Office #: [phone]; Pager #: [phone]

## 2023-02-24 NOTE — HPI
52 y.o. female with hypertension, hyperlipidemia, COPD, bipolar 1 disorder, DM2 presents with a complaint of foot pain.  Appears to be a chronic intermittent problem for some time, associated with erythema and edema of the extremity.  Was on IV vancomycin for awhile.  Has home health wound care.  Sees Podiatry Dr. De Los Santos and Infectious Disease Dr. aJrocho panchal.  Denies fever, chills, cough, SOB, chest pain, palpitations, dizziness, syncope, nausea, vomiting, diarrhea, abdominal pain.  In the ED, she was found to have leukocytosis with elevated lactic acid.  Inflammatory markers are also elevated.  X-ray of the foot an ultrasound of the veins of the lower extremity were unremarkable for acute abnormality.  Blood cultures were obtained and she was started on IV vancomycin in the ED.  Placed in observation.

## 2023-02-24 NOTE — PROGRESS NOTES
Pharmacokinetic Initial Assessment: IV Vancomycin    Assessment/Plan:    Initiate intravenous vancomycin with loading dose of 2500 mg once followed by a maintenance dose of vancomycin 1750mg IV every 12 hours  Desired empiric serum trough concentration is 10 to 20 mcg/mL  Draw vancomycin trough level 60 min prior to fourth dose on 2/25/23 at approximately 0700  Pharmacy will continue to follow and monitor vancomycin.      Please contact pharmacy at extension [phone] with any questions regarding this assessment.     Thank you for the consult,   Augustina Arredondo       Patient brief summary:  Audrey Natarajan is a 52 y.o. female initiated on antimicrobial therapy with IV Vancomycin for treatment of suspected skin & soft tissue infection    Drug Allergies:   Review of patient's allergies indicates:   Allergen Reactions    Morphine Other (See Comments)     Patient had a psychotic episode after taking Morphine  Agitation, hallucinations    Penicillins Anaphylaxis     Tolerated cephalosporins in the past    Januvia [sitagliptin] Hives    Carbamazepine Other (See Comments)     hyponatremia       Actual Body Weight:   106.6 kg     Renal Function:   Estimated Creatinine Clearance: 103.9 mL/min (based on SCr of 0.8 mg/dL).,     Dialysis Method (if applicable):  N/A    CBC (last 72 hours):  Recent Labs   Lab Result Units 02/23/23  1558   WBC K/uL 17.37*   Hemoglobin g/dL 11.8*   Hematocrit % 37.8   Platelets K/uL 863*   Gran % % 53.2   Lymph % % 35.2   Mono % % 7.7   Eosinophil % % 2.6   Basophil % % 0.8   Differential Method  Automated       Metabolic Panel (last 72 hours):  Recent Labs   Lab Result Units 02/23/23  1558 02/23/23  1842   Sodium mmol/L 138  --    Potassium mmol/L 4.1  --    Chloride mmol/L 99  --    CO2 mmol/L 26  --    Glucose mg/dL 162*  --    Glucose, UA   --  Negative   BUN mg/dL 16  --    Creatinine mg/dL 0.8  --    Albumin g/dL 3.8  --    Total Bilirubin mg/dL 0.2  --    Alkaline Phosphatase U/L 84  --     AST U/L 15  --    ALT U/L 8*  --        Drug levels (last 3 results):  No results for input(s): VANCOMYCINRA, VANCORANDOM, VANCOMYCINPE, VANCOPEAK, VANCOMYCINTR, VANCOTROUGH in the last 72 hours.    Microbiologic Results:  Microbiology Results (last 7 days)       Procedure Component Value Units Date/Time    Blood Culture #1 **CANNOT BE ORDERED STAT** [[id number]] Collected: 02/23/23 1614    Order Status: Sent Specimen: Blood from Peripheral, Antecubital, Left Updated: 02/23/23 1621    Blood Culture #2 **CANNOT BE ORDERED STAT** [[id number]] Collected: 02/23/23 1558    Order Status: Sent Specimen: Blood from Peripheral, Antecubital, Right Updated: 02/23/23 1606

## 2023-02-24 NOTE — ASSESSMENT & PLAN NOTE
Erythema, edema, with worsening pain.  Leukocytosis and elevated lactic acid elevated inflammatory markers on lab work.  Blood cultures are pending.  Continue IV antibiotics.  Podiatry and ID consulted.

## 2023-02-24 NOTE — ASSESSMENT & PLAN NOTE
"51y/o M with h/o DM with charcot foot, DVT on Eliquis, PAD, ongoing tobacco abuse admitted 2/23 with chronic L foot wound and worsening pain/swelling. Notably just completed 4 weeks vancomycin at LTAC for possible (note prior bone biopsy path/cultures 1/13 negative for OM). LLE Dvt studies negative. S/p podiatry debridement at bedside today- gram stain negative. Currently on vancomycin. ID consulted for "foot wound, leukocytosis, elevated lactic"    Not clear that infection is playing role in chronic foot ulcer. Likely poor wound healing from combination from  DM, ongoing tobacco abuse, PAD and psychosocial factors    Recommendations:   - if podiatry concerned for infection/OM, would consider bone biopsy for path/culture  - hold antibiotics to increase yield of culture  - defer need for amputation to podiatry, but would likely be definitive treatment of infection  - counseled on need for tobacco cessation         "

## 2023-02-24 NOTE — CONSULTS
"[facility] Surg  Podiatry  Consult Note    Patient Name: Audrey Natarajan  MRN: [id number]  Admission Date: 2/23/2023  Hospital Length of Stay: 0 days  Attending Physician: Micha Grossman MD  Primary Care Provider: Donaldo Pena MD     Inpatient consult to Podiatry  Consult performed by: Halle Gill DPM  Consult ordered by: Oral Mackey Jr., NP      Subjective:     History of Present Illness: 53 y/o female PMH DM2, Charcot left foot admitted for left foot infection. Patient recently admitted for left foot infection in Dec. 2022 and Jan 2023. Patient discharged to LTAC for IV abx, wound care completed stay at LTAC on 2/7/23. Recently saw ID on 2/8/23 then 2/22/2(note pending completion). On ID note from 2/8/23 recommendation was to "continue to monitor off antibiotics". Patient also had HH for dressing change. Patient reports increased pain LLE in the last day prompting her to report to ED.         Scheduled Meds:   divalproex  500 mg Oral Daily    enoxaparin  40 mg Subcutaneous Daily    insulin detemir U-100  15 Units Subcutaneous QHS    lisinopriL  10 mg Oral Daily    metoprolol tartrate  25 mg Oral BID    polyethylene glycol  17 g Oral Daily    pravastatin  40 mg Oral QHS    risperidone 1 mg/ml  3 mg Oral QHS    vancomycin (VANCOCIN) IVPB  1,750 mg Intravenous Q12H     Continuous Infusions:  PRN Meds:acetaminophen, aluminum-magnesium hydroxide-simethicone, dextrose 10%, dextrose 10%, dextrose, dextrose, glucagon (human recombinant), HYDROmorphone, insulin aspart U-100, melatonin, naloxone, ondansetron, prochlorperazine, simethicone, sodium chloride 0.9%, Pharmacy to dose Vancomycin consult **AND** vancomycin - pharmacy to dose    Review of patient's allergies indicates:   Allergen Reactions    Morphine Other (See Comments)     Patient had a psychotic episode after taking Morphine  Agitation, hallucinations    Penicillins Anaphylaxis     Tolerated cephalosporins in the past    Januvia " "[sitagliptin] Hives    Carbamazepine Other (See Comments)     hyponatremia        Past Medical History:   Diagnosis Date    ADHD (attention deficit hyperactivity disorder)     Arthritis     Asthma     Bipolar 1 disorder     Cataract     Cigarette smoker     COPD (chronic obstructive pulmonary disease)     Coronary artery disease     A fib    Depression     bipolar manic depresson    Diabetes mellitus     Diabetic foot ulcers     Diabetic neuropathy     DVT of lower extremity, bilateral 07/2013    bilateral LE DVT. [place] filter placed.     Encounter for blood transfusion     History of blood clots 1. Left Leg=2003; 2.Bilateral Groin=Blood Clots= 5 or 6/ 2013 & 7/2013; 3. LLL of Lung=7/2013;  4. Lt. Lower Leg=7/2013.     Pt. had 1st Blood Clot after Bhqwtuofewec=2647, & Last=2013. [place] Filter= Rt.Lateral Neck.    HTN (hypertension) 06/06/2013    Pt states that she does not have hypertension    Hypercholesteremia     Irregular heartbeat     Neuromuscular disorder     neuropathy feet    Obese     PE (pulmonary embolism) 07/2013    bilat LE DVT.     Restless leg syndrome      Past Surgical History:   Procedure Laterality Date    ABDOMINAL SURGERY  2010    gastric sleeve    BILATERAL OOPHORECTOMY Bilateral 1/12/2015    CHOLECYSTECTOMY      DEBRIDEMENT OF FOOT Bilateral 5/10/2022    Procedure: DEBRIDEMENT, FOOT;  Surgeon: Maira De Los Santos DPM;  Location: [facility] OR;  Service: Podiatry;  Laterality: Bilateral;    [place] filter Right 7/4/2012    Right Neck & Tunneled Down.    HERNIA REPAIR      "[place] of Hernias Repaires around th [place].", pt. states    INCISION AND DRAINAGE FOOT Left 12/24/2022    Procedure: INCISION AND DRAINAGE, FOOT;  Surgeon: Fahad Razo DPM;  Location: [facility] OR;  Service: Podiatry;  Laterality: Left;    LAPAROSCOPIC CHOLECYSTECTOMY N/A 9/10/2020    Procedure: CHOLECYSTECTOMY, LAPAROSCOPIC;  Surgeon: Montrell Gutierrez MD;  Location: [facility] OR;  Service: General;  Laterality: N/A;  RN PREOP " ----COVID Negative      OVARIAN CYST REMOVAL  3/13/2014    [place] REMOVAL OF OVARY/TUBE(S)      SPLENECTOMY, TOTAL  2003    TONSILLECTOMY      as a child    TYMPANOSTOMY TUBE PLACEMENT  1976    VEIN SURGERY      Lt leg       Family History       Problem Relation (Age of Onset)    Cataracts Father    Diabetes Father, Paternal Grandfather    Heart disease Father, Paternal Grandfather    Hypertension Father    No Known Problems Mother, Sister, Brother, Maternal Aunt, Maternal Uncle, Paternal Aunt, Paternal Uncle, Maternal Grandfather    Ovarian cancer Maternal Grandmother, Paternal Grandmother          Tobacco Use    Smoking status: Every Day     Packs/day: 1.00     Years: 37.00     Pack years: 37.00     Types: Cigarettes     Last attempt to quit: 2020     Years since quittin.2    Smokeless tobacco: Current    Tobacco comments:     Enrolled in the [employer] on 5/3/14 ([facility] Member ID # [id number]). Ambulatory referral to Smoking Cessation Program   Substance and Sexual Activity    Alcohol use: No     Alcohol/week: 0.0 standard drinks    Drug use: No    Sexual activity: Yes     Partners: Male     Review of Systems   Genitourinary: Negative.    Musculoskeletal:  Positive for arthralgias.   Skin:  Positive for wound.   Psychiatric/Behavioral: Negative.     Objective:     Vital Signs (Most Recent):  Temp: 97.9 °F (36.6 °C) (23 0735)  Pulse: 73 (23 0735)  Resp: 20 (23 0913)  BP: (!) 122/56 (23 0909)  SpO2: 95 % (23 0735)   Vital Signs (24h Range):  Temp:  [97.9 °F (36.6 °C)-98.7 °F (37.1 °C)] 97.9 °F (36.6 °C)  Pulse:  [65-90] 73  Resp:  [0-28] 20  SpO2:  [87 %-97 %] 95 %  BP: (103-166)/(53-78) 122/56     Weight: 106.6 kg (235 lb)  Body mass index is 37.93 kg/m².    Foot Exam    General  Orientation: alert and oriented to person, place, and time       Right Foot/Ankle     Neurovascular  Dorsalis pedis: 1+  Posterior tibial: 1+      Left Foot/Ankle      Inspection and  Palpation  Skin Exam: ulcer;     Neurovascular  Dorsalis pedis: 1+  Posterior tibial: 1+      2/24/23:  Wound 1: left plantar foot ulceration.   Base: fibrogranular wound base   Periwound skin: HPK  Drainage: serous   Probe: probe to bone.       Pre debridement       Post debridement         Laboratory:  CBC:   Recent Labs   Lab 02/24/23 0417   WBC 10.75   RBC 4.35   HGB 10.0*   HCT 32.8*   *   MCV 75*   MCH 23.0*   MCHC 30.5*     CMP:   Recent Labs   Lab 02/24/23 0417   *   CALCIUM 9.5   ALBUMIN 3.2*   PROT 6.5      K 4.1   CO2 28      BUN 17   CREATININE 0.7   ALKPHOS 68   ALT 9*   AST 14   BILITOT 0.4       Diagnostic Results:  Xray: X-Ray Foot Complete Left  Order: [id number]  Status: Final result     Visible to patient: Yes (not seen)     Next appt: 03/06/2023 at 09:00 AM in Cardiology [place] [facility] [place]     Dx: Diabetic ulcer of left foot     0 Result Notes  Details    Reading Physician Reading Date Result Priority   Tu Santos Jr., MD  [phone]  [phone] 2/23/2023 STAT     Narrative & Impression  EXAMINATION:  XR FOOT COMPLETE 3 VIEW LEFT     CLINICAL HISTORY:  Type 2 diabetes mellitus with foot ulcer     TECHNIQUE:  XR FOOT COMPLETE 3 VIEW LEFT     COMPARISON:  01/09/2023     FINDINGS:  Large area of soft tissue ulceration is seen involving the plantar aspect of the midfoot with Charcot neuropathic changes seen throughout the midfoot.  No gross bone erosion, destruction, or aggressive periosteal reaction.  Nonspecific soft tissue swelling of the foot.     Impression:     No radiographic evidence of osteomyelitis        MRI: ordered  Clinical Findings:  Left plantar foot ulceration with probe to bone.     Assessment/Plan:     Active Diagnoses:    Diagnosis Date Noted POA    PRINCIPAL PROBLEM:  Cellulitis [L03.90] 02/10/2021 Yes    Type 2 diabetes mellitus [E11.9] 09/26/2016 Yes     Chronic    Bipolar 1 disorder [F31.9] 04/13/2015 Yes     Chronic     Hyperlipidemia [E78.5] 02/13/2014 Yes     Chronic    COPD (chronic obstructive pulmonary disease) [J44.9] 10/11/2013 Yes     Chronic    Essential hypertension [I10] 06/06/2013 Yes     Chronic      Problems Resolved During this Admission:       MRI left foot ordered    Discussed two options with patient. LLE proximal amputation BKA  vs  IV abx for six weeks with aggressive wound care for several months with no guarantee of wound resolution. Patient undecided.     Procedure: After verbal consent, a sterile #5 curette  was used to excisionally debride viable and non viable tissue on the plantar aspect of the left foot. Tissue debrided through epidermis, dermis, and subcutaneous tissue. Tissue excised included epidermis, dermis, sucutaneous tissue, fibrin, biofilm, and callus. Cultures obtained.     Nursing orders placed for daily dressing change.         Thank you for your consult. I will follow-up with patient. Please contact us if you have any additional questions.    Halle Gill DPM  Podiatry  [facility] [place] - Med Surg

## 2023-02-24 NOTE — PLAN OF CARE
[place] - Emergency Dept  Discharge Assessment    SW completed brief assessment and discussed discharge planning with patient at her bedside. Patient stated that she lives alone, but her friend Crystal serve as source of support for her. Patient stated that she drove herself here and upon discharge he will drive herself to get home if she can not she will call someone to pick her up     Primary Care Provider: Donaldo Pena MD     Discharge Assessment (most recent)       BRIEF DISCHARGE ASSESSMENT - 02/23/23 2520          Discharge Planning    Assessment Type Discharge Planning Brief Assessment     Resource/Environmental Concerns none     Support Systems Family members     Equipment Currently Used at Home bath bench;nebulizer;wheelchair;walker, rolling     Current Living Arrangements home     Patient/Family Anticipates Transition to home     Patient/Family Anticipated Services at Transition none     DME Needed Upon Discharge  none     Discharge Plan A Home     Discharge Plan B Home

## 2023-02-24 NOTE — CONSULTS
[facility] Surg  Infectious Disease  Consult Note    Patient Name: Audrey Natarajan  MRN: [id number]  Admission Date: 2/23/2023  Hospital Length of Stay: 0 days  Attending Physician: Micha Grossman MD  Primary Care Provider: Donaldo Pena MD     Isolation Status: No active isolations        Inpatient consult to Infectious Diseases  Consult performed by: Beverly Zavala MD  Consult ordered by: Oral Mackey Jr., NP

## 2023-02-24 NOTE — SUBJECTIVE & OBJECTIVE
"Past Medical History:   Diagnosis Date    ADHD (attention deficit hyperactivity disorder)     Arthritis     Asthma     Bipolar 1 disorder     Cataract     Cigarette smoker     COPD (chronic obstructive pulmonary disease)     Coronary artery disease     A fib    Depression     bipolar manic depresson    Diabetes mellitus     Diabetic foot ulcers     Diabetic neuropathy     DVT of lower extremity, bilateral 07/2013    bilateral LE DVT. [place] filter placed.     Encounter for blood transfusion     History of blood clots 1. Left Leg=2003; 2.Bilateral Groin=Blood Clots= 5 or 6/ 2013 & 7/2013; 3. LLL of Lung=7/2013;  4. Lt. Lower Leg=7/2013.     Pt. had 1st Blood Clot after Sfoitcrfveeg=0841, & Last=2013. [place] Filter= Rt.Lateral Neck.    HTN (hypertension) 06/06/2013    Pt states that she does not have hypertension    Hypercholesteremia     Irregular heartbeat     Neuromuscular disorder     neuropathy feet    Obese     PE (pulmonary embolism) 07/2013    bilat LE DVT.     Restless leg syndrome        Past Surgical History:   Procedure Laterality Date    ABDOMINAL SURGERY  2010    gastric sleeve    BILATERAL OOPHORECTOMY Bilateral 1/12/2015    CHOLECYSTECTOMY      DEBRIDEMENT OF FOOT Bilateral 5/10/2022    Procedure: DEBRIDEMENT, FOOT;  Surgeon: Maira De Los Santos DPM;  Location: [facility] OR;  Service: Podiatry;  Laterality: Bilateral;    Green' s filter Right 7/4/2012    Right Neck & Tunneled Down.    HERNIA REPAIR      "[place] of Hernias Repaires around th [place].", pt. states    INCISION AND DRAINAGE FOOT Left 12/24/2022    Procedure: INCISION AND DRAINAGE, FOOT;  Surgeon: Fahad Razo DPM;  Location: [facility] OR;  Service: Podiatry;  Laterality: Left;    LAPAROSCOPIC CHOLECYSTECTOMY N/A 9/10/2020    Procedure: CHOLECYSTECTOMY, LAPAROSCOPIC;  Surgeon: Montrell Gutierrez MD;  Location: [facility] OR;  Service: General;  Laterality: N/A;  RN PREOP 9/9----COVID Negative  9/9    OVARIAN CYST REMOVAL  3/13/2014    [place] REMOVAL OF " OVARY/TUBE(S)      SPLENECTOMY, TOTAL  July 2003    TONSILLECTOMY      as a child    TYMPANOSTOMY TUBE PLACEMENT  1976    VEIN SURGERY  2003    Lt leg       Review of patient's allergies indicates:   Allergen Reactions    Morphine Other (See Comments)     Patient had a psychotic episode after taking Morphine  Agitation, hallucinations    Penicillins Anaphylaxis     Tolerated cephalosporins in the past    Januvia [sitagliptin] Hives    Carbamazepine Other (See Comments)     hyponatremia       No current facility-administered medications on file prior to encounter.     Current Outpatient Medications on File Prior to Encounter   Medication Sig    acetaminophen (TYLENOL) 500 MG tablet Take 2 tablets (1,000 mg total) by mouth every 6 (six) hours as needed for Pain.    albuterol (PROVENTIL/VENTOLIN HFA) 90 mcg/actuation inhaler INHALE 2 PUFFS INTO THE LUNGS EVERY 6 HOURS AS NEEDED FOR WHEEZING. RESCUE    apixaban (ELIQUIS) 5 mg Tab Take 1 tablet (5 mg total) by mouth 2 (two) times daily.    aspirin 81 MG Chew Take 1 tablet (81 mg total) by mouth once daily.    bumetanide (BUMEX) 1 MG tablet Take 1 tablet (1 mg total) by mouth once daily.    divalproex (DEPAKOTE) 500 MG TbEC Take 1 tablet (500 mg total) by mouth once daily. PO QAM    ferrous sulfate 325 (65 FE) MG EC tablet Take 1 tablet (325 mg total) by mouth once daily.    hydrOXYzine (ATARAX) 50 MG tablet Take 0.5 tablets (25 mg total) by mouth 4 (four) times daily as needed for Itching or Anxiety.    lisinopriL 10 MG tablet Take 1 tablet (10 mg total) by mouth once daily.    loratadine (CLARITIN) 10 mg tablet Take 1 tablet (10 mg total) by mouth once daily.    metFORMIN (GLUCOPHAGE) 1000 MG tablet Take 1 tablet (1,000 mg total) by mouth 2 (two) times daily with meals.    metoprolol tartrate (LOPRESSOR) 25 MG tablet Take 1 tablet (25 mg total) by mouth 2 (two) times daily.    multivitamin Tab Take 1 tablet by mouth once daily.    pantoprazole (PROTONIX) 40 MG tablet  Take 1 tablet (40 mg total) by mouth once daily.    pravastatin (PRAVACHOL) 40 MG tablet Take 1 tablet (40 mg total) by mouth every evening.    risperiDONE (RISPERDAL M-TABS) 3 MG disintegrating tablet Take 1 tablet (3 mg total) by mouth 2 (two) times daily. (Patient taking differently: Take 3 mg by mouth nightly.)    semaglutide (OZEMPIC) 1 mg/dose (4 mg/3 mL) Inject 1 mg into the skin every 7 days.    VYVANSE 40 mg Cap Take 40 mg by mouth once daily.    albuterol-ipratropium (DUO-NEB) 2.5 mg-0.5 mg/3 mL nebulizer solution Take 3 mLs by nebulization every 6 (six) hours as needed for Wheezing or Shortness of Breath. Rescue    ammonium lactate (LAC-HYDRIN) 12 % lotion APPL Y ONCE TOPICALLY TWICE DAILY FOR 30 DAYS    DUPIXENT  mg/2 mL PnIj Inject into the skin.    fluticasone propionate (FLONASE) 50 mcg/actuation nasal spray 2 sprays (100 mcg total) by Each Nostril route daily as needed (Nasal congestion).    fluticasone-salmeterol diskus inhaler 250-50 mcg Inhale 1 puff into the lungs 2 (two) times daily. Controller    insulin detemir U-100 (LEVEMIR FLEXTOUCH) 100 unit/mL (3 mL) SubQ InPn pen Inject 22 Units into the skin every evening.    LIDOcaine (LIDODERM) 5 % Place 1 patch onto the skin once daily. Remove & Discard patch within 12 hours or as directed by MD    magnesium hydroxide 400 mg/5 ml (MILK OF MAGNESIA) 400 mg/5 mL Susp Take 30 mLs (2,400 mg total) by mouth daily as needed.    methocarbamoL (ROBAXIN) 500 MG Tab Take 500 mg by mouth. Frequency could not be confirmed.    nystatin (NYSTOP) powder APPLY TO ABDOMINAL AND BREAST SKIN FOLD TWICE DAILY.    oxyCODONE-acetaminophen (PERCOCET)  mg per tablet Take 1 tablet by mouth every 6 (six) hours as needed for Pain.    polyethylene glycol (GLYCOLAX) 17 gram PwPk Take 17 g by mouth once daily.    senna-docusate 8.6-50 mg (PERICOLACE) 8.6-50 mg per tablet Take 1 tablet by mouth 2 (two) times daily.    [DISCONTINUED] diclofenac sodium (VOLTAREN) 1 %  Gel Apply 2 g topically 4 (four) times daily as needed (Apply to painful area up to 4 times a day as needed for pain). Apply to painful area 4 times a day as needed for pain    [DISCONTINUED] furosemide (LASIX) 20 MG tablet TAKE 1 TABLET(20 MG) BY MOUTH EVERY DAY    [DISCONTINUED] QUEtiapine (SEROQUEL) 200 MG Tab Take 1 tablet (200 mg total) by mouth before breakfast.     Family History       Problem Relation (Age of Onset)    Cataracts Father    Diabetes Father, Paternal Grandfather    Heart disease Father, Paternal Grandfather    Hypertension Father    No Known Problems Mother, Sister, Brother, Maternal Aunt, Maternal Uncle, Paternal Aunt, Paternal Uncle, Maternal Grandfather    Ovarian cancer Maternal Grandmother, Paternal Grandmother          Tobacco Use    Smoking status: Every Day     Packs/day: 1.00     Years: 37.00     Pack years: 37.00     Types: Cigarettes     Last attempt to quit: 2020     Years since quittin.2    Smokeless tobacco: Current    Tobacco comments:     Enrolled in the [employer] on 5/3/14 ([facility] Member ID # [id number]). Ambulatory referral to Smoking Cessation Program   Substance and Sexual Activity    Alcohol use: No     Alcohol/week: 0.0 standard drinks    Drug use: No    Sexual activity: Yes     Partners: Male     Review of Systems   Constitutional:  Negative for chills, fatigue and fever.   HENT:  Negative for congestion and rhinorrhea.    Eyes:  Negative for photophobia and visual disturbance.   Respiratory:  Negative for cough and shortness of breath.    Cardiovascular:  Positive for leg swelling. Negative for chest pain and palpitations.   Gastrointestinal:  Negative for abdominal pain, diarrhea, nausea and vomiting.   Genitourinary:  Negative for dysuria, frequency and urgency.   Musculoskeletal:  Positive for arthralgias and myalgias.   Skin:  Positive for color change and wound. Negative for pallor and rash.   Neurological:  Negative for light-headedness and headaches.    Psychiatric/Behavioral:  Negative for confusion and decreased concentration.    Objective:     Vital Signs (Most Recent):  Temp: 98.5 °F (36.9 °C) (02/23/23 1545)  Pulse: 74 (02/23/23 2133)  Resp: 15 (02/23/23 2247)  BP: (!) 125/58 (02/23/23 2133)  SpO2: 95 % (02/23/23 2133)   Vital Signs (24h Range):  Temp:  [98.5 °F (36.9 °C)-98.7 °F (37.1 °C)] 98.5 °F (36.9 °C)  Pulse:  [74-90] 74  Resp:  [15-28] 15  SpO2:  [93 %-97 %] 95 %  BP: (125-166)/(58-78) 125/58     Weight: 106.6 kg (235 lb)  Body mass index is 37.93 kg/m².    Physical Exam  Vitals and nursing note reviewed.   Constitutional:       General: She is not in acute distress.     Appearance: She is well-developed.   HENT:      Head: Normocephalic and atraumatic.      Right Ear: External ear normal.      Left Ear: External ear normal.      Nose: Nose normal.   Eyes:      Conjunctiva/sclera: Conjunctivae normal.      Pupils: Pupils are equal, round, and reactive to light.   Cardiovascular:      Rate and Rhythm: Normal rate and regular rhythm.   Pulmonary:      Effort: Pulmonary effort is normal. No respiratory distress.      Breath sounds: Normal breath sounds. No wheezing.   Abdominal:      General: Bowel sounds are normal. There is no distension.      Palpations: Abdomen is soft.      Tenderness: There is no abdominal tenderness.      Comments: No palpable hepatomegaly or splenomegaly    Musculoskeletal:         General: No tenderness. Normal range of motion.      Cervical back: Normal range of motion and neck supple.      Comments: Dressing c/d/i   Skin:     General: Skin is warm and dry.   Neurological:      Mental Status: She is alert and oriented to person, place, and time.   Psychiatric:         Thought Content: Thought content normal.         CRANIAL NERVES     CN III, IV, VI   Pupils are equal, round, and reactive to light.     Significant Labs: All pertinent labs within the past 24 hours have been reviewed.    Significant Imaging: I have reviewed all  pertinent imaging results/findings within the past 24 hours.

## 2023-02-25 LAB
ALBUMIN SERPL BCP-MCNC: 3.3 G/DL (ref 3.5–5.2)
ALP SERPL-CCNC: 77 U/L (ref 55–135)
ALT SERPL W/O P-5'-P-CCNC: 13 U/L (ref 10–44)
ANION GAP SERPL CALC-SCNC: 10 MMOL/L (ref 8–16)
AST SERPL-CCNC: 20 U/L (ref 10–40)
BASOPHILS # BLD AUTO: 0.1 K/UL (ref 0–0.2)
BASOPHILS NFR BLD: 1.2 % (ref 0–1.9)
BILIRUB SERPL-MCNC: 0.3 MG/DL (ref 0.1–1)
BUN SERPL-MCNC: 10 MG/DL (ref 6–20)
CALCIUM SERPL-MCNC: 9.5 MG/DL (ref 8.7–10.5)
CHLORIDE SERPL-SCNC: 104 MMOL/L (ref 95–110)
CO2 SERPL-SCNC: 24 MMOL/L (ref 23–29)
CREAT SERPL-MCNC: 0.6 MG/DL (ref 0.5–1.4)
DIFFERENTIAL METHOD: ABNORMAL
EOSINOPHIL # BLD AUTO: 0.5 K/UL (ref 0–0.5)
EOSINOPHIL NFR BLD: 5.5 % (ref 0–8)
ERYTHROCYTE [DISTWIDTH] IN BLOOD BY AUTOMATED COUNT: 18.6 % (ref 11.5–14.5)
EST. GFR  (NO RACE VARIABLE): >60 ML/MIN/1.73 M^2
GLUCOSE SERPL-MCNC: 171 MG/DL (ref 70–110)
HCT VFR BLD AUTO: 37.8 % (ref 37–48.5)
HGB BLD-MCNC: 11 G/DL (ref 12–16)
IMM GRANULOCYTES # BLD AUTO: 0.02 K/UL (ref 0–0.04)
IMM GRANULOCYTES NFR BLD AUTO: 0.2 % (ref 0–0.5)
LYMPHOCYTES # BLD AUTO: 3.3 K/UL (ref 1–4.8)
LYMPHOCYTES NFR BLD: 39 % (ref 18–48)
MCH RBC QN AUTO: 23 PG (ref 27–31)
MCHC RBC AUTO-ENTMCNC: 29.1 G/DL (ref 32–36)
MCV RBC AUTO: 79 FL (ref 82–98)
MONOCYTES # BLD AUTO: 1 K/UL (ref 0.3–1)
MONOCYTES NFR BLD: 11.5 % (ref 4–15)
NEUTROPHILS # BLD AUTO: 3.6 K/UL (ref 1.8–7.7)
NEUTROPHILS NFR BLD: 42.6 % (ref 38–73)
NRBC BLD-RTO: 0 /100 WBC
PLATELET # BLD AUTO: 386 K/UL (ref 150–450)
PLATELET BLD QL SMEAR: ABNORMAL
PMV BLD AUTO: 11 FL (ref 9.2–12.9)
POCT GLUCOSE: 157 MG/DL (ref 70–110)
POCT GLUCOSE: 194 MG/DL (ref 70–110)
POCT GLUCOSE: 237 MG/DL (ref 70–110)
POCT GLUCOSE: 257 MG/DL (ref 70–110)
POTASSIUM SERPL-SCNC: 4.6 MMOL/L (ref 3.5–5.1)
PROT SERPL-MCNC: 6.7 G/DL (ref 6–8.4)
RBC # BLD AUTO: 4.78 M/UL (ref 4–5.4)
SODIUM SERPL-SCNC: 138 MMOL/L (ref 136–145)
VANCOMYCIN TROUGH SERPL-MCNC: 16.2 UG/ML (ref 10–22)
WBC # BLD AUTO: 8.36 K/UL (ref 3.9–12.7)

## 2023-02-25 PROCEDURE — 99233 PR SUBSEQUENT HOSPITAL CARE,LEVL III: ICD-10-PCS | Mod: ,,, | Performed by: PODIATRIST

## 2023-02-25 PROCEDURE — 99900035 HC TECH TIME PER 15 MIN (STAT)

## 2023-02-25 PROCEDURE — 94761 N-INVAS EAR/PLS OXIMETRY MLT: CPT

## 2023-02-25 PROCEDURE — 11000001 HC ACUTE MED/SURG PRIVATE ROOM

## 2023-02-25 PROCEDURE — 25000003 PHARM REV CODE 250: Performed by: HOSPITALIST

## 2023-02-25 PROCEDURE — 80053 COMPREHEN METABOLIC PANEL: CPT | Performed by: HOSPITALIST

## 2023-02-25 PROCEDURE — 85025 COMPLETE CBC W/AUTO DIFF WBC: CPT | Performed by: HOSPITALIST

## 2023-02-25 PROCEDURE — 99233 SBSQ HOSP IP/OBS HIGH 50: CPT | Mod: ,,, | Performed by: PODIATRIST

## 2023-02-25 PROCEDURE — 25000003 PHARM REV CODE 250: Performed by: INTERNAL MEDICINE

## 2023-02-25 PROCEDURE — 94760 N-INVAS EAR/PLS OXIMETRY 1: CPT

## 2023-02-25 PROCEDURE — 80202 ASSAY OF VANCOMYCIN: CPT | Performed by: INTERNAL MEDICINE

## 2023-02-25 PROCEDURE — 36415 COLL VENOUS BLD VENIPUNCTURE: CPT | Performed by: INTERNAL MEDICINE

## 2023-02-25 PROCEDURE — 25000003 PHARM REV CODE 250: Performed by: PODIATRIST

## 2023-02-25 PROCEDURE — 63600175 PHARM REV CODE 636 W HCPCS: Performed by: HOSPITALIST

## 2023-02-25 PROCEDURE — 63600175 PHARM REV CODE 636 W HCPCS: Performed by: INTERNAL MEDICINE

## 2023-02-25 RX ADMIN — HYDROMORPHONE HYDROCHLORIDE 1 MG: 1 INJECTION, SOLUTION INTRAMUSCULAR; INTRAVENOUS; SUBCUTANEOUS at 10:02

## 2023-02-25 RX ADMIN — METOPROLOL TARTRATE 25 MG: 25 TABLET, FILM COATED ORAL at 08:02

## 2023-02-25 RX ADMIN — Medication: at 04:02

## 2023-02-25 RX ADMIN — VANCOMYCIN HYDROCHLORIDE 1750 MG: 500 INJECTION, POWDER, LYOPHILIZED, FOR SOLUTION INTRAVENOUS at 08:02

## 2023-02-25 RX ADMIN — METOPROLOL TARTRATE 25 MG: 25 TABLET, FILM COATED ORAL at 09:02

## 2023-02-25 RX ADMIN — PRAVASTATIN SODIUM 40 MG: 40 TABLET ORAL at 09:02

## 2023-02-25 RX ADMIN — VANCOMYCIN HYDROCHLORIDE 1750 MG: 500 INJECTION, POWDER, LYOPHILIZED, FOR SOLUTION INTRAVENOUS at 09:02

## 2023-02-25 RX ADMIN — ONDANSETRON 4 MG: 2 INJECTION INTRAMUSCULAR; INTRAVENOUS at 08:02

## 2023-02-25 RX ADMIN — ONDANSETRON 4 MG: 2 INJECTION INTRAMUSCULAR; INTRAVENOUS at 09:02

## 2023-02-25 RX ADMIN — LISINOPRIL 10 MG: 5 TABLET ORAL at 08:02

## 2023-02-25 RX ADMIN — HYDROMORPHONE HYDROCHLORIDE 1 MG: 1 INJECTION, SOLUTION INTRAMUSCULAR; INTRAVENOUS; SUBCUTANEOUS at 11:02

## 2023-02-25 RX ADMIN — INSULIN DETEMIR 15 UNITS: 100 INJECTION, SOLUTION SUBCUTANEOUS at 09:02

## 2023-02-25 RX ADMIN — DIVALPROEX SODIUM 500 MG: 250 TABLET, DELAYED RELEASE ORAL at 08:02

## 2023-02-25 RX ADMIN — RISPERIDONE 3 MG: 1 SOLUTION ORAL at 09:02

## 2023-02-25 RX ADMIN — INSULIN ASPART 2 UNITS: 100 INJECTION, SOLUTION INTRAVENOUS; SUBCUTANEOUS at 11:02

## 2023-02-25 RX ADMIN — Medication 6 MG: at 09:02

## 2023-02-25 RX ADMIN — HYDROMORPHONE HYDROCHLORIDE 1 MG: 1 INJECTION, SOLUTION INTRAMUSCULAR; INTRAVENOUS; SUBCUTANEOUS at 04:02

## 2023-02-25 RX ADMIN — ENOXAPARIN SODIUM 40 MG: 40 INJECTION SUBCUTANEOUS at 04:02

## 2023-02-25 RX ADMIN — INSULIN ASPART 4 UNITS: 100 INJECTION, SOLUTION INTRAVENOUS; SUBCUTANEOUS at 05:02

## 2023-02-25 NOTE — PROGRESS NOTES
"[facility] Medicine  Progress Note    Patient Name: Audrey Natarajan  MRN: [id number]  Patient Class: IP- Inpatient   Admission Date: 2/23/2023  Length of Stay: 0 days  Attending Physician: Micha Grossman MD  Primary Care Provider: Donaldo Pena MD        Subjective:     Principal Problem:Osteomyelitis of left foot        HPI:  52 y.o. female with hypertension, hyperlipidemia, COPD, bipolar 1 disorder, DM2 presents with a complaint of foot pain.  Appears to be a chronic intermittent problem for some time, associated with erythema and edema of the extremity.  Was on IV vancomycin for awhile.  Has home health wound care.  Sees Podiatry Dr. De Los Santos and Infectious Disease Dr. Jarocho panchal.  Denies fever, chills, cough, SOB, chest pain, palpitations, dizziness, syncope, nausea, vomiting, diarrhea, abdominal pain.  In the ED, she was found to have leukocytosis with elevated lactic acid.  Inflammatory markers are also elevated.  X-ray of the foot an ultrasound of the veins of the lower extremity were unremarkable for acute abnormality.  Blood cultures were obtained and she was started on IV vancomycin in the ED.  Placed in observation.      Overview/Hospital Course:  Audrey Natarajan was placed under observation for management of left plantar foot ulceration, and need for further evaluation by podiatry and infectious disease.    Podiatry evaluated the patient and conducted a bedside debridement.  Cultures have been sent off and are pending.  Options were discussed with the patient by Podiatry which included amputation versus IV antibiotics for 6 weeks.  MRI midfoot obtained which shows: "Similar appearance of extensive underlying Charcot changes at the midfoot and metatarsal bases with concern for osteomyelitis at the inferior cuboid."  Podiatry evaluated the patient and recommends bone biopsy as well as holding antibiotics. Midline placed due to poor venous access.       Interval History: " Midline placed overnight due to poor venous access. Continues to complain of 9/10 left foot pain.     Review of Systems   Constitutional:  Positive for fatigue. Negative for activity change, appetite change and chills.   HENT:  Negative for congestion, ear pain, postnasal drip, rhinorrhea, sinus pain, sneezing, sore throat and trouble swallowing.    Eyes:  Negative for pain, redness and visual disturbance.   Respiratory:  Positive for cough. Negative for chest tightness, shortness of breath and wheezing.    Cardiovascular:  Negative for chest pain and leg swelling.   Gastrointestinal:  Positive for abdominal pain and constipation. Negative for abdominal distention, diarrhea, nausea and vomiting.   Genitourinary:  Negative for decreased urine volume, difficulty urinating, dysuria, frequency, hematuria and urgency.   Musculoskeletal:  Positive for gait problem and joint swelling. Negative for arthralgias and back pain.   Skin:  Positive for color change and wound.   Neurological:  Positive for headaches. Negative for dizziness and weakness.   Objective:     Vital Signs (Most Recent):  Temp: 98.4 °F (36.9 °C) (02/25/23 1200)  Pulse: 68 (02/25/23 1200)  Resp: 17 (02/25/23 1200)  BP: (!) 119/57 (02/25/23 1200)  SpO2: (!) 94 % (02/25/23 1200)   Vital Signs (24h Range):  Temp:  [97.4 °F (36.3 °C)-98.8 °F (37.1 °C)] 98.4 °F (36.9 °C)  Pulse:  [67-81] 68  Resp:  [16-20] 17  SpO2:  [93 %-96 %] 94 %  BP: (117-151)/(57-74) 119/57     Weight: 104 kg (229 lb 4.5 oz)  Body mass index is 37.01 kg/m².    Intake/Output Summary (Last 24 hours) at 2/25/2023 1358  Last data filed at 2/25/2023 1307  Gross per 24 hour   Intake 1220.15 ml   Output 1900 ml   Net -679.85 ml      Physical Exam  Vitals reviewed.   Constitutional:       General: She is not in acute distress.     Appearance: Normal appearance. She is obese. She is not ill-appearing.   HENT:      Head: Normocephalic and atraumatic.      Right Ear: External ear normal.      Left  Ear: External ear normal.      Nose: Nose normal.      Mouth/Throat:      Mouth: Mucous membranes are moist.      Pharynx: Oropharynx is clear.   Eyes:      General:         Right eye: No discharge.         Left eye: No discharge.      Extraocular Movements: Extraocular movements intact.      Pupils: Pupils are equal, round, and reactive to light.   Cardiovascular:      Rate and Rhythm: Normal rate.      Pulses: Normal pulses.      Heart sounds: Normal heart sounds. No murmur heard.    No friction rub. No gallop.   Pulmonary:      Effort: Pulmonary effort is normal. No respiratory distress.      Breath sounds: Normal breath sounds. No wheezing.   Abdominal:      General: Bowel sounds are normal. There is no distension.      Palpations: Abdomen is soft.      Tenderness: There is no abdominal tenderness. There is no guarding.   Musculoskeletal:         General: No swelling. Normal range of motion.      Cervical back: Normal range of motion.   Feet:      Left foot:      Skin integrity: Ulcer and skin breakdown present.      Comments: Left foot and complains of 9/10 pin with drainage noted on plantar aspect of dressing.  Skin:     General: Skin is warm.      Capillary Refill: Capillary refill takes less than 2 seconds.   Neurological:      General: No focal deficit present.      Mental Status: She is alert and oriented to person, place, and time.      Motor: No weakness.       Significant Labs: All pertinent labs within the past 24 hours have been reviewed.  A1C:   Recent Labs   Lab 09/08/22  0546 09/20/22  0957 12/22/22  2322   HGBA1C 9.1* 8.6* 7.1*     Bilirubin:   Recent Labs   Lab 02/23/23  1558 02/24/23  0417 02/25/23  0654   BILITOT 0.2 0.4 0.3     Blood Culture:   Recent Labs   Lab 02/23/23  1558 02/23/23  1614   LABBLOO No Growth to date  No Growth to date No Growth to date  No Growth to date     BMP:   Recent Labs   Lab 02/25/23  0654   *      K 4.6      CO2 24   BUN 10   CREATININE 0.6  "  CALCIUM 9.5     CBC:   Recent Labs   Lab 02/23/23  1558 02/24/23  0417 02/25/23  0654   WBC 17.37* 10.75 8.36   HGB 11.8* 10.0* 11.0*   HCT 37.8 32.8* 37.8   * 723* 386     CMP:   Recent Labs   Lab 02/23/23  1558 02/24/23  0417 02/25/23  0654    138 138   K 4.1 4.1 4.6   CL 99 100 104   CO2 26 28 24   * 140* 171*   BUN 16 17 10   CREATININE 0.8 0.7 0.6   CALCIUM 10.1 9.5 9.5   PROT 8.1 6.5 6.7   ALBUMIN 3.8 3.2* 3.3*   BILITOT 0.2 0.4 0.3   ALKPHOS 84 68 77   AST 15 14 20   ALT 8* 9* 13   ANIONGAP 13 10 10     Lactic Acid:   Recent Labs   Lab 02/23/23  1558 02/23/23  2246   LACTATE 2.4* 2.5*     POCT Glucose:   Recent Labs   Lab 02/24/23  1946 02/25/23  0743 02/25/23  1159   POCTGLUCOSE 217* 157* 194*     TSH:   Recent Labs   Lab 12/22/22  1755   TSH 1.880     Urine Studies:   Recent Labs   Lab 02/23/23  1842   COLORU Yellow   APPEARANCEUA Clear   PHUR 6.0   SPECGRAV 1.010   PROTEINUA Negative   GLUCUA Negative   KETONESU Negative   BILIRUBINUA Negative   OCCULTUA Negative   NITRITE Negative   UROBILINOGEN Negative   LEUKOCYTESUR Trace*   WBCUA 1   BACTERIA Rare   SQUAMEPITHEL 2       Significant Imaging: I have reviewed all pertinent imaging results/findings within the past 24 hours.      Assessment/Plan:      * Osteomyelitis of left foot  Erythema, edema, with worsening pain.  Leukocytosis and elevated lactic acid elevated inflammatory markers on lab work.  Blood cultures are pending.  Continue IV antibiotics.  Podiatry and ID consulted.    02/24: Leukocytosis resolved; s/p bedside debridement by Podiatry; MRI foot shows "Similar appearance of extensive underlying Charcot changes at the midfoot and metatarsal bases with concern for osteomyelitis at the inferior cuboid." Cultures obtained and pending. ID recommending bone biopsy and holding abx to increase yield of culture.     02/25: Continues to endorse foot pain.  Plantar aspect of dressing noted to be what with drainage.    SHAWN " (obstructive sleep apnea)  CPAP QHS    Type 2 diabetes mellitus  Patient's FSGs are controlled on current medication regimen.  Last A1c reviewed-   Lab Results   Component Value Date    HGBA1C 7.1 (H) 12/22/2022     Most recent fingerstick glucose reviewed-   Recent Labs   Lab 02/24/23  1700 02/24/23  1946 02/25/23  0743 02/25/23  1159   POCTGLUCOSE 180* 217* 157* 194*     Current correctional scale  Medium  Maintain anti-hyperglycemic dose as follows-   Antihyperglycemics (From admission, onward)    Start     Stop Route Frequency Ordered    02/23/23 2330  insulin detemir U-100 pen 15 Units         -- SubQ Nightly 02/23/23 2229 02/23/23 2328  insulin aspart U-100 pen 1-10 Units         -- SubQ Before meals & nightly PRN 02/23/23 2229      Diabetic Diet  Hold Oral hypoglycemics while patient is in the hospital.    Bipolar 1 disorder  Continue home regimen of risperidone    Tobacco abuse  5 minutes spent counseling the patient on smoking cessation and she is not currently ready to stop smoking. She will be monitored for withdrawal.  She will be provided with additional smoking cessation counseling prior to discharge.    Hyperlipidemia  Continue statin    COPD (chronic obstructive pulmonary disease)  P.r.n. nebs    Essential hypertension  Well controlled, continue home medications and monitor blood pressure, adjust as needed.       VTE Risk Mitigation (From admission, onward)         Ordered     enoxaparin injection 40 mg  Daily         02/23/23 2229     IP VTE HIGH RISK PATIENT  Once         02/23/23 2229     Place sequential compression device  Until discontinued         02/23/23 2229                Discharge Planning   HUMBERTO:      Code Status: Full Code   Is the patient medically ready for discharge?:     Reason for patient still in hospital (select all that apply): Patient trending condition and Treatment  Discharge Plan A: Home          Javier Goodwin PA-C  Department of Hospital Medicine  [facility]  02/25/2023

## 2023-02-25 NOTE — ASSESSMENT & PLAN NOTE
Patient's FSGs are controlled on current medication regimen.  Last A1c reviewed-   Lab Results   Component Value Date    HGBA1C 7.1 (H) 12/22/2022     Most recent fingerstick glucose reviewed-   Recent Labs   Lab 02/24/23  1700 02/24/23  1946 02/25/23  0743 02/25/23  1159   POCTGLUCOSE 180* 217* 157* 194*     Current correctional scale  Medium  Maintain anti-hyperglycemic dose as follows-   Antihyperglycemics (From admission, onward)    Start     Stop Route Frequency Ordered    02/23/23 2330  insulin detemir U-100 pen 15 Units         -- SubQ Nightly 02/23/23 2229    02/23/23 2328  insulin aspart U-100 pen 1-10 Units         -- SubQ Before meals & nightly PRN 02/23/23 2229      Diabetic Diet  Hold Oral hypoglycemics while patient is in the hospital.

## 2023-02-25 NOTE — PROGRESS NOTES
Pharmacokinetic Assessment Follow Up: IV Vancomycin    Vancomycin serum concentration assessment(s):    The trough level was drawn correctly and can be used to guide therapy at this time. The measurement is within the desired definitive target range of 15 to 20 mcg/mL.    Vancomycin Regimen Plan:    Continue regimen to Vancomycin 1750 mg IV every 12 hours with next serum trough concentration measured at 19:00 prior to 4th dose on 2/26/2023    Drug levels (last 3 results):  Recent Labs   Lab Result Units 02/25/23  0655   Vancomycin-Trough ug/mL 16.2       Pharmacy will continue to follow and monitor vancomycin.    Please contact pharmacy at extension [phone] for questions regarding this assessment.    Thank you for the consult,   Anna Banks       Patient brief summary:  Audrey Natarajan is a 52 y.o. female initiated on antimicrobial therapy with IV Vancomycin for treatment of skin & soft tissue infection    Drug Allergies:   Review of patient's allergies indicates:   Allergen Reactions    Morphine Other (See Comments)     Patient had a psychotic episode after taking Morphine  Agitation, hallucinations    Penicillins Anaphylaxis     Tolerated cephalosporins in the past    Januvia [sitagliptin] Hives    Carbamazepine Other (See Comments)     hyponatremia       Actual Body Weight:   104 kg    Renal Function:   Estimated Creatinine Clearance: 136.7 mL/min (based on SCr of 0.6 mg/dL).,     Dialysis Method (if applicable):  N/A    CBC (last 72 hours):  Recent Labs   Lab Result Units 02/23/23  1558 02/24/23  0417   WBC K/uL 17.37* 10.75   Hemoglobin g/dL 11.8* 10.0*   Hematocrit % 37.8 32.8*   Platelets K/uL 863* 723*   Gran % % 53.2 39.5   Lymph % % 35.2 40.5   Mono % % 7.7 13.1   Eosinophil % % 2.6 5.4   Basophil % % 0.8 1.0   Differential Method  Automated Automated       Metabolic Panel (last 72 hours):  Recent Labs   Lab Result Units 02/23/23  1558 02/23/23  1842 02/24/23  0417 02/25/23  0654   Sodium mmol/L 138   --  138 138   Potassium mmol/L 4.1  --  4.1 4.6   Chloride mmol/L 99  --  100 104   CO2 mmol/L 26  --  28 24   Glucose mg/dL 162*  --  140* 171*   Glucose, UA   --  Negative  --   --    BUN mg/dL 16  --  17 10   Creatinine mg/dL 0.8  --  0.7 0.6   Albumin g/dL 3.8  --  3.2* 3.3*   Total Bilirubin mg/dL 0.2  --  0.4 0.3   Alkaline Phosphatase U/L 84  --  68 77   AST U/L 15  --  14 20   ALT U/L 8*  --  9* 13       Vancomycin Administrations:  vancomycin given in the last 96 hours                     vancomycin (VANCOCIN) 1,750 mg in dextrose 5 % (D5W) 500 mL IVPB (mg) 1,750 mg New Bag 02/24/23 2101     1,750 mg New Bag  0912    vancomycin (VANCOCIN) 2,500 mg in dextrose 5 % (D5W) 500 mL IVPB (mg) 2,500 mg New Bag 02/23/23 2002                    Microbiologic Results:  Microbiology Results (last 7 days)       Procedure Component Value Units Date/Time    Blood Culture #2 **CANNOT BE ORDERED STAT** [[id number]] Collected: 02/23/23 1558    Order Status: Completed Specimen: Blood from Peripheral, Antecubital, Right Updated: 02/24/23 1703     Blood Culture, Routine No Growth to date      No Growth to date    Blood Culture #1 **CANNOT BE ORDERED STAT** [[id number]] Collected: 02/23/23 1614    Order Status: Completed Specimen: Blood from Peripheral, Antecubital, Left Updated: 02/24/23 1703     Blood Culture, Routine No Growth to date      No Growth to date    Gram stain [[id number]] Collected: 02/24/23 0936    Order Status: Completed Specimen: Wound from Foot, Left Updated: 02/24/23 1226     Gram Stain Result No WBC's      No organisms seen    Culture, Anaerobe [[id number]] Collected: 02/24/23 0936    Order Status: Sent Specimen: Wound from Foot, Left Updated: 02/24/23 0959    Aerobic culture [[id number]] Collected: 02/24/23 0936    Order Status: Sent Specimen: Wound from Foot, Left Updated: 02/24/23 0959

## 2023-02-25 NOTE — PLAN OF CARE
[facility]  Initial Discharge Assessment       Primary Care Provider: Donaldo Pena MD    Admission Diagnosis: Left leg pain [M79.605]  SIRS (systemic inflammatory response syndrome) [R65.10]  Cellulitis of left leg [L03.116]  Diabetic ulcer of left foot [E11.621, L97.529]  Chest pain [R07.9]  Left leg swelling [M79.89]    Admission Date: 2/23/2023  Expected Discharge Date:          Payor: MEDICAID / Plan: [place] / Product Type: Managed Medicaid /     Extended Emergency Contact Information  Primary Emergency Contact: AntAnitra   [facility]  Home Phone: [phone]  Relation: Sister  Secondary Emergency Contact: Tabitha Man   [facility]  Home Phone: [phone]  Relation: Other  Mother: Catia Weathers  Mobile Phone: [phone]    Discharge Plan A: Home  Discharge Plan B: Home      [id number] - [place]  [address] AT [facility] [address]  [place] [address]  Phone: [phone] Fax: [phone]    [facility] [address] [address]  Phone: [phone] Fax: [phone]    [id number] - [place], [place] - [address] AT [facility]  [address]  [address]  [facility] [address]  Phone: [phone] Fax: [phone]    [facility] - [place] - [address]  [address] [facility]  [place] [address]  Phone: [phone] Fax: [phone]    [id number] - [place] - [address] AT [facility] [address]  [address] [place] [address]  Phone: [phone] Fax: [phone]    [id number] - [place] - [address] AT [facility] & [address]  [address] [place] [address]  Phone: [phone] Fax: [phone]    [facility] [address]  [address]   [place] [address]  Phone: [phone] Fax: [phone]      Initial Assessment (most recent)       Adult Discharge Assessment - 02/25/23 1412          Discharge Assessment    Assessment Type Discharge Planning Assessment     Confirmed/corrected address, phone number and insurance Yes     Confirmed Demographics Correct on Facesheet     Source of Information patient;health record     Communicated HUMBERTO with patient/caregiver Date not available/Unable to determine     Reason For Admission Osteomyelitis of left foot     People in Home parent(s)     Do you expect to return to your current living situation? Yes     Do you have help at home or someone to help you manage your care at home? Yes     Who are your caregiver(s) and their phone number(s)? Anitra Cain (Sister)   [phone] (Home Phone)     Prior to hospitilization cognitive status: Alert/Oriented     Current cognitive status: Alert/Oriented     Equipment Currently Used at Home bath bench;nebulizer;wheelchair;walker, rolling     Readmission within 30 days? No     Patient currently being followed by outpatient case management? No     Do you currently have service(s) that help you manage your care at home? No     Do you take prescription medications? Yes     Do you have prescription coverage? Yes     Coverage Medicaid     Do you have any problems affording any of your prescribed medications? No     Is the patient taking medications as prescribed? yes     Who is going to help you get home at discharge? Anitra Cain (Sister)   [phone] (Home Phone)     How do you get to doctors appointments? family or friend will provide;health plan transportation     Are you on dialysis? No     Do you take coumadin? No     Discharge Plan A Home     Discharge Plan B Home     DME Needed Upon Discharge  none

## 2023-02-25 NOTE — PLAN OF CARE
Problem: Skin Injury Risk Increased  Goal: Skin Health and Integrity  Outcome: Ongoing, Progressing     Problem: Adult Inpatient Plan of Care  Goal: Plan of Care Review  Outcome: Ongoing, Progressing  Goal: Patient-Specific Goal (Individualized)  Outcome: Ongoing, Progressing  Goal: Absence of Hospital-Acquired Illness or Injury  Outcome: Ongoing, Progressing  Goal: Optimal Comfort and Wellbeing  Outcome: Ongoing, Progressing  Goal: Readiness for Transition of Care  Outcome: Ongoing, Progressing     Problem: Infection  Goal: Absence of Infection Signs and Symptoms  Outcome: Ongoing, Progressing     Problem: Fall Injury Risk  Goal: Absence of Fall and Fall-Related Injury  Outcome: Ongoing, Progressing     Problem: Diabetes Comorbidity  Goal: Blood Glucose Level Within Targeted Range  Outcome: Ongoing, Progressing     Problem: Impaired Wound Healing  Goal: Optimal Wound Healing  Outcome: Ongoing, Progressing

## 2023-02-25 NOTE — ASSESSMENT & PLAN NOTE
"Erythema, edema, with worsening pain.  Leukocytosis and elevated lactic acid elevated inflammatory markers on lab work.  Blood cultures are pending.  Continue IV antibiotics.  Podiatry and ID consulted.    02/24: Leukocytosis resolved; s/p bedside debridement by Podiatry; MRI foot shows "Similar appearance of extensive underlying Charcot changes at the midfoot and metatarsal bases with concern for osteomyelitis at the inferior cuboid." Cultures obtained and pending. ID recommending bone biopsy and holding abx to increase yield of culture.     02/25: Continues to endorse foot pain.  Plantar aspect of dressing noted to be what with drainage.  "

## 2023-02-25 NOTE — PROGRESS NOTES
Pharmacokinetic Assessment Follow Up: IV Vancomycin    Vancomycin serum concentration assessment(s):    The trough level was drawn correctly and can be used to guide therapy at this time. The measurement is within the desired definitive target range of 10 to 20 mcg/mL.    Vancomycin Regimen Plan:    Continue regimen to Vancomycin 1750 mg IV every 12 hours with next serum trough concentration measured at 2/26/2023 prior to 4th dose on 19:00    Drug levels (last 3 results):  Recent Labs   Lab Result Units 02/25/23  0655   Vancomycin-Trough ug/mL 16.2       Pharmacy will continue to follow and monitor vancomycin.    Please contact pharmacy at extension [phone] for questions regarding this assessment.    Thank you for the consult,   Anna Banks       Patient brief summary:  Audrey Natarajan is a 52 y.o. female initiated on antimicrobial therapy with IV Vancomycin for treatment of skin & soft tissue infection    Drug Allergies:   Review of patient's allergies indicates:   Allergen Reactions    Morphine Other (See Comments)     Patient had a psychotic episode after taking Morphine  Agitation, hallucinations    Penicillins Anaphylaxis     Tolerated cephalosporins in the past    Januvia [sitagliptin] Hives    Carbamazepine Other (See Comments)     hyponatremia       Actual Body Weight:   104 kg    Renal Function:   Estimated Creatinine Clearance: 136.7 mL/min (based on SCr of 0.6 mg/dL).,     Dialysis Method (if applicable):  N/A    CBC (last 72 hours):  Recent Labs   Lab Result Units 02/23/23  1558 02/24/23  0417   WBC K/uL 17.37* 10.75   Hemoglobin g/dL 11.8* 10.0*   Hematocrit % 37.8 32.8*   Platelets K/uL 863* 723*   Gran % % 53.2 39.5   Lymph % % 35.2 40.5   Mono % % 7.7 13.1   Eosinophil % % 2.6 5.4   Basophil % % 0.8 1.0   Differential Method  Automated Automated       Metabolic Panel (last 72 hours):  Recent Labs   Lab Result Units 02/23/23  1558 02/23/23  1842 02/24/23  0417 02/25/23  0654   Sodium mmol/L 138   --  138 138   Potassium mmol/L 4.1  --  4.1 4.6   Chloride mmol/L 99  --  100 104   CO2 mmol/L 26  --  28 24   Glucose mg/dL 162*  --  140* 171*   Glucose, UA   --  Negative  --   --    BUN mg/dL 16  --  17 10   Creatinine mg/dL 0.8  --  0.7 0.6   Albumin g/dL 3.8  --  3.2* 3.3*   Total Bilirubin mg/dL 0.2  --  0.4 0.3   Alkaline Phosphatase U/L 84  --  68 77   AST U/L 15  --  14 20   ALT U/L 8*  --  9* 13       Vancomycin Administrations:  vancomycin given in the last 96 hours                     vancomycin (VANCOCIN) 1,750 mg in dextrose 5 % (D5W) 500 mL IVPB (mg) 1,750 mg New Bag 02/24/23 2101     1,750 mg New Bag  0912    vancomycin (VANCOCIN) 2,500 mg in dextrose 5 % (D5W) 500 mL IVPB (mg) 2,500 mg New Bag 02/23/23 2002                    Microbiologic Results:  Microbiology Results (last 7 days)       Procedure Component Value Units Date/Time    Blood Culture #2 **CANNOT BE ORDERED STAT** [[id number]] Collected: 02/23/23 1558    Order Status: Completed Specimen: Blood from Peripheral, Antecubital, Right Updated: 02/24/23 1703     Blood Culture, Routine No Growth to date      No Growth to date    Blood Culture #1 **CANNOT BE ORDERED STAT** [[id number]] Collected: 02/23/23 1614    Order Status: Completed Specimen: Blood from Peripheral, Antecubital, Left Updated: 02/24/23 1703     Blood Culture, Routine No Growth to date      No Growth to date    Gram stain [[id number]] Collected: 02/24/23 0936    Order Status: Completed Specimen: Wound from Foot, Left Updated: 02/24/23 1226     Gram Stain Result No WBC's      No organisms seen    Culture, Anaerobe [[id number]] Collected: 02/24/23 0936    Order Status: Sent Specimen: Wound from Foot, Left Updated: 02/24/23 0959    Aerobic culture [[id number]] Collected: 02/24/23 0936    Order Status: Sent Specimen: Wound from Foot, Left Updated: 02/24/23 0959

## 2023-02-25 NOTE — SUBJECTIVE & OBJECTIVE
Interval History: Midline placed overnight due to poor venous access. Continues to complain of 9/10 left foot pain.     Review of Systems   Constitutional:  Positive for fatigue. Negative for activity change, appetite change and chills.   HENT:  Negative for congestion, ear pain, postnasal drip, rhinorrhea, sinus pain, sneezing, sore throat and trouble swallowing.    Eyes:  Negative for pain, redness and visual disturbance.   Respiratory:  Positive for cough. Negative for chest tightness, shortness of breath and wheezing.    Cardiovascular:  Negative for chest pain and leg swelling.   Gastrointestinal:  Positive for abdominal pain and constipation. Negative for abdominal distention, diarrhea, nausea and vomiting.   Genitourinary:  Negative for decreased urine volume, difficulty urinating, dysuria, frequency, hematuria and urgency.   Musculoskeletal:  Positive for gait problem and joint swelling. Negative for arthralgias and back pain.   Skin:  Positive for color change and wound.   Neurological:  Positive for headaches. Negative for dizziness and weakness.   Objective:     Vital Signs (Most Recent):  Temp: 98.4 °F (36.9 °C) (02/25/23 1200)  Pulse: 68 (02/25/23 1200)  Resp: 17 (02/25/23 1200)  BP: (!) 119/57 (02/25/23 1200)  SpO2: (!) 94 % (02/25/23 1200)   Vital Signs (24h Range):  Temp:  [97.4 °F (36.3 °C)-98.8 °F (37.1 °C)] 98.4 °F (36.9 °C)  Pulse:  [67-81] 68  Resp:  [16-20] 17  SpO2:  [93 %-96 %] 94 %  BP: (117-151)/(57-74) 119/57     Weight: 104 kg (229 lb 4.5 oz)  Body mass index is 37.01 kg/m².    Intake/Output Summary (Last 24 hours) at 2/25/2023 1358  Last data filed at 2/25/2023 1307  Gross per 24 hour   Intake 1220.15 ml   Output 1900 ml   Net -679.85 ml      Physical Exam  Vitals reviewed.   Constitutional:       General: She is not in acute distress.     Appearance: Normal appearance. She is obese. She is not ill-appearing.   HENT:      Head: Normocephalic and atraumatic.      Right Ear: External ear  normal.      Left Ear: External ear normal.      Nose: Nose normal.      Mouth/Throat:      Mouth: Mucous membranes are moist.      Pharynx: Oropharynx is clear.   Eyes:      General:         Right eye: No discharge.         Left eye: No discharge.      Extraocular Movements: Extraocular movements intact.      Pupils: Pupils are equal, round, and reactive to light.   Cardiovascular:      Rate and Rhythm: Normal rate.      Pulses: Normal pulses.      Heart sounds: Normal heart sounds. No murmur heard.    No friction rub. No gallop.   Pulmonary:      Effort: Pulmonary effort is normal. No respiratory distress.      Breath sounds: Normal breath sounds. No wheezing.   Abdominal:      General: Bowel sounds are normal. There is no distension.      Palpations: Abdomen is soft.      Tenderness: There is no abdominal tenderness. There is no guarding.   Musculoskeletal:         General: No swelling. Normal range of motion.      Cervical back: Normal range of motion.   Feet:      Left foot:      Skin integrity: Ulcer and skin breakdown present.      Comments: Left foot and complains of 9/10 pin with drainage noted on plantar aspect of dressing.  Skin:     General: Skin is warm.      Capillary Refill: Capillary refill takes less than 2 seconds.   Neurological:      General: No focal deficit present.      Mental Status: She is alert and oriented to person, place, and time.      Motor: No weakness.       Significant Labs: All pertinent labs within the past 24 hours have been reviewed.  A1C:   Recent Labs   Lab 09/08/22  0546 09/20/22  0957 12/22/22  2322   HGBA1C 9.1* 8.6* 7.1*     Bilirubin:   Recent Labs   Lab 02/23/23  1558 02/24/23  0417 02/25/23  0654   BILITOT 0.2 0.4 0.3     Blood Culture:   Recent Labs   Lab 02/23/23  1558 02/23/23  1614   LABBLOO No Growth to date  No Growth to date No Growth to date  No Growth to date     BMP:   Recent Labs   Lab 02/25/23  0654   *      K 4.6      CO2 24   BUN 10    CREATININE 0.6   CALCIUM 9.5     CBC:   Recent Labs   Lab 02/23/23  1558 02/24/23  0417 02/25/23  0654   WBC 17.37* 10.75 8.36   HGB 11.8* 10.0* 11.0*   HCT 37.8 32.8* 37.8   * 723* 386     CMP:   Recent Labs   Lab 02/23/23  1558 02/24/23  0417 02/25/23  0654    138 138   K 4.1 4.1 4.6   CL 99 100 104   CO2 26 28 24   * 140* 171*   BUN 16 17 10   CREATININE 0.8 0.7 0.6   CALCIUM 10.1 9.5 9.5   PROT 8.1 6.5 6.7   ALBUMIN 3.8 3.2* 3.3*   BILITOT 0.2 0.4 0.3   ALKPHOS 84 68 77   AST 15 14 20   ALT 8* 9* 13   ANIONGAP 13 10 10     Lactic Acid:   Recent Labs   Lab 02/23/23  1558 02/23/23  2246   LACTATE 2.4* 2.5*     POCT Glucose:   Recent Labs   Lab 02/24/23  1946 02/25/23  0743 02/25/23  1159   POCTGLUCOSE 217* 157* 194*     TSH:   Recent Labs   Lab 12/22/22  1755   TSH 1.880     Urine Studies:   Recent Labs   Lab 02/23/23  1842   COLORU Yellow   APPEARANCEUA Clear   PHUR 6.0   SPECGRAV 1.010   PROTEINUA Negative   GLUCUA Negative   KETONESU Negative   BILIRUBINUA Negative   OCCULTUA Negative   NITRITE Negative   UROBILINOGEN Negative   LEUKOCYTESUR Trace*   WBCUA 1   BACTERIA Rare   SQUAMEPITHEL 2       Significant Imaging: I have reviewed all pertinent imaging results/findings within the past 24 hours.

## 2023-02-25 NOTE — PLAN OF CARE
Problem: Skin Injury Risk Increased  Goal: Skin Health and Integrity  Outcome: Ongoing, Progressing     Problem: Adult Inpatient Plan of Care  Goal: Plan of Care Review  Outcome: Ongoing, Progressing     Problem: Infection  Goal: Absence of Infection Signs and Symptoms  Outcome: Ongoing, Progressing     Problem: Fall Injury Risk  Goal: Absence of Fall and Fall-Related Injury  Outcome: Ongoing, Progressing     Problem: Diabetes Comorbidity  Goal: Blood Glucose Level Within Targeted Range  Outcome: Ongoing, Progressing     Problem: Impaired Wound Healing  Goal: Optimal Wound Healing  Outcome: Ongoing, Progressing

## 2023-02-25 NOTE — NURSING
Pt reporting 8/10 pain. Left upper arm midline noted. Left foot wrapped; dressing saturated. Will implement dressing change as ordered

## 2023-02-26 LAB
ALBUMIN SERPL BCP-MCNC: 3.1 G/DL (ref 3.5–5.2)
ALP SERPL-CCNC: 72 U/L (ref 55–135)
ALT SERPL W/O P-5'-P-CCNC: 13 U/L (ref 10–44)
ANION GAP SERPL CALC-SCNC: 12 MMOL/L (ref 8–16)
AST SERPL-CCNC: 12 U/L (ref 10–40)
BACTERIA SPEC AEROBE CULT: ABNORMAL
BASOPHILS # BLD AUTO: 0.11 K/UL (ref 0–0.2)
BASOPHILS NFR BLD: 0.9 % (ref 0–1.9)
BILIRUB SERPL-MCNC: 0.3 MG/DL (ref 0.1–1)
BUN SERPL-MCNC: 12 MG/DL (ref 6–20)
CALCIUM SERPL-MCNC: 9 MG/DL (ref 8.7–10.5)
CHLORIDE SERPL-SCNC: 102 MMOL/L (ref 95–110)
CO2 SERPL-SCNC: 26 MMOL/L (ref 23–29)
CREAT SERPL-MCNC: 0.7 MG/DL (ref 0.5–1.4)
DIFFERENTIAL METHOD: ABNORMAL
EOSINOPHIL # BLD AUTO: 0.7 K/UL (ref 0–0.5)
EOSINOPHIL NFR BLD: 5.9 % (ref 0–8)
ERYTHROCYTE [DISTWIDTH] IN BLOOD BY AUTOMATED COUNT: 17.9 % (ref 11.5–14.5)
EST. GFR  (NO RACE VARIABLE): >60 ML/MIN/1.73 M^2
GLUCOSE SERPL-MCNC: 208 MG/DL (ref 70–110)
HCT VFR BLD AUTO: 33.6 % (ref 37–48.5)
HGB BLD-MCNC: 9.9 G/DL (ref 12–16)
IMM GRANULOCYTES # BLD AUTO: 0.06 K/UL (ref 0–0.04)
IMM GRANULOCYTES NFR BLD AUTO: 0.5 % (ref 0–0.5)
LYMPHOCYTES # BLD AUTO: 4.9 K/UL (ref 1–4.8)
LYMPHOCYTES NFR BLD: 40.4 % (ref 18–48)
MCH RBC QN AUTO: 22.7 PG (ref 27–31)
MCHC RBC AUTO-ENTMCNC: 29.5 G/DL (ref 32–36)
MCV RBC AUTO: 77 FL (ref 82–98)
MONOCYTES # BLD AUTO: 1.7 K/UL (ref 0.3–1)
MONOCYTES NFR BLD: 13.8 % (ref 4–15)
NEUTROPHILS # BLD AUTO: 4.7 K/UL (ref 1.8–7.7)
NEUTROPHILS NFR BLD: 38.5 % (ref 38–73)
NRBC BLD-RTO: 0 /100 WBC
PLATELET # BLD AUTO: 698 K/UL (ref 150–450)
PLATELET BLD QL SMEAR: ABNORMAL
PMV BLD AUTO: 10.1 FL (ref 9.2–12.9)
POCT GLUCOSE: 183 MG/DL (ref 70–110)
POCT GLUCOSE: 216 MG/DL (ref 70–110)
POCT GLUCOSE: 238 MG/DL (ref 70–110)
POCT GLUCOSE: 245 MG/DL (ref 70–110)
POCT GLUCOSE: 302 MG/DL (ref 70–110)
POTASSIUM SERPL-SCNC: 4.7 MMOL/L (ref 3.5–5.1)
PROT SERPL-MCNC: 6.3 G/DL (ref 6–8.4)
RBC # BLD AUTO: 4.36 M/UL (ref 4–5.4)
SODIUM SERPL-SCNC: 140 MMOL/L (ref 136–145)
WBC # BLD AUTO: 12.09 K/UL (ref 3.9–12.7)

## 2023-02-26 PROCEDURE — 25000003 PHARM REV CODE 250: Performed by: INTERNAL MEDICINE

## 2023-02-26 PROCEDURE — 25000003 PHARM REV CODE 250: Performed by: STUDENT IN AN ORGANIZED HEALTH CARE EDUCATION/TRAINING PROGRAM

## 2023-02-26 PROCEDURE — 99232 PR SUBSEQUENT HOSPITAL CARE,LEVL II: ICD-10-PCS | Mod: ,,, | Performed by: PODIATRIST

## 2023-02-26 PROCEDURE — 63600175 PHARM REV CODE 636 W HCPCS: Performed by: HOSPITALIST

## 2023-02-26 PROCEDURE — 11000001 HC ACUTE MED/SURG PRIVATE ROOM

## 2023-02-26 PROCEDURE — 25000003 PHARM REV CODE 250: Performed by: HOSPITALIST

## 2023-02-26 PROCEDURE — 85025 COMPLETE CBC W/AUTO DIFF WBC: CPT | Performed by: HOSPITALIST

## 2023-02-26 PROCEDURE — 99232 SBSQ HOSP IP/OBS MODERATE 35: CPT | Mod: ,,, | Performed by: PODIATRIST

## 2023-02-26 PROCEDURE — 63700000 PHARM REV CODE 250 ALT 637 W/O HCPCS: Performed by: STUDENT IN AN ORGANIZED HEALTH CARE EDUCATION/TRAINING PROGRAM

## 2023-02-26 PROCEDURE — 99900035 HC TECH TIME PER 15 MIN (STAT)

## 2023-02-26 PROCEDURE — 36415 COLL VENOUS BLD VENIPUNCTURE: CPT | Performed by: HOSPITALIST

## 2023-02-26 PROCEDURE — 80053 COMPREHEN METABOLIC PANEL: CPT | Performed by: HOSPITALIST

## 2023-02-26 PROCEDURE — 63600175 PHARM REV CODE 636 W HCPCS: Performed by: INTERNAL MEDICINE

## 2023-02-26 RX ORDER — FLUCONAZOLE 100 MG/1
200 TABLET ORAL ONCE
Status: COMPLETED | OUTPATIENT
Start: 2023-02-26 | End: 2023-02-26

## 2023-02-26 RX ORDER — MICONAZOLE NITRATE 2 %
1 CREAM WITH APPLICATOR VAGINAL NIGHTLY
Status: DISCONTINUED | OUTPATIENT
Start: 2023-02-26 | End: 2023-03-03 | Stop reason: HOSPADM

## 2023-02-26 RX ADMIN — LISINOPRIL 10 MG: 5 TABLET ORAL at 08:02

## 2023-02-26 RX ADMIN — DIVALPROEX SODIUM 500 MG: 250 TABLET, DELAYED RELEASE ORAL at 08:02

## 2023-02-26 RX ADMIN — INSULIN ASPART 2 UNITS: 100 INJECTION, SOLUTION INTRAVENOUS; SUBCUTANEOUS at 08:02

## 2023-02-26 RX ADMIN — ENOXAPARIN SODIUM 40 MG: 40 INJECTION SUBCUTANEOUS at 05:02

## 2023-02-26 RX ADMIN — HYDROMORPHONE HYDROCHLORIDE 1 MG: 1 INJECTION, SOLUTION INTRAMUSCULAR; INTRAVENOUS; SUBCUTANEOUS at 06:02

## 2023-02-26 RX ADMIN — ONDANSETRON 4 MG: 2 INJECTION INTRAMUSCULAR; INTRAVENOUS at 08:02

## 2023-02-26 RX ADMIN — HYDROMORPHONE HYDROCHLORIDE 1 MG: 1 INJECTION, SOLUTION INTRAMUSCULAR; INTRAVENOUS; SUBCUTANEOUS at 12:02

## 2023-02-26 RX ADMIN — INSULIN DETEMIR 15 UNITS: 100 INJECTION, SOLUTION SUBCUTANEOUS at 08:02

## 2023-02-26 RX ADMIN — METOPROLOL TARTRATE 25 MG: 25 TABLET, FILM COATED ORAL at 08:02

## 2023-02-26 RX ADMIN — INSULIN ASPART 8 UNITS: 100 INJECTION, SOLUTION INTRAVENOUS; SUBCUTANEOUS at 12:02

## 2023-02-26 RX ADMIN — VANCOMYCIN HYDROCHLORIDE 1750 MG: 500 INJECTION, POWDER, LYOPHILIZED, FOR SOLUTION INTRAVENOUS at 08:02

## 2023-02-26 RX ADMIN — PRAVASTATIN SODIUM 40 MG: 40 TABLET ORAL at 08:02

## 2023-02-26 RX ADMIN — Medication: at 10:02

## 2023-02-26 RX ADMIN — MICONAZOLE NITRATE 1 APPLICATOR: 20 CREAM VAGINAL at 08:02

## 2023-02-26 RX ADMIN — RISPERIDONE 3 MG: 1 SOLUTION ORAL at 08:02

## 2023-02-26 RX ADMIN — FLUCONAZOLE 200 MG: 100 TABLET ORAL at 12:02

## 2023-02-26 RX ADMIN — INSULIN ASPART 4 UNITS: 100 INJECTION, SOLUTION INTRAVENOUS; SUBCUTANEOUS at 04:02

## 2023-02-26 NOTE — PROGRESS NOTES
[facility]  Podiatry  Progress Note    Patient Name: Audrey Natarajan  MRN: [id number]  Admission Date: 2/23/2023  Hospital Length of Stay: 0 days  Attending Physician: Micha Grossman MD  Primary Care Provider: Donaldo Pena MD     Subjective:     Interval History: NAEON. Afebrile. Leukocytosis resolved. Patient notes improvement in pain. Dressing clean, intact, minor serous strike thru. L ft mri with concern for osteomyelitis at the inferior cuboid. Wound culture: GNR non-lactose .         Scheduled Meds:   divalproex  500 mg Oral Daily    enoxaparin  40 mg Subcutaneous Daily    insulin detemir U-100  15 Units Subcutaneous QHS    lisinopriL  10 mg Oral Daily    metoprolol tartrate  25 mg Oral BID    polyethylene glycol  17 g Oral Daily    pravastatin  40 mg Oral QHS    risperidone 1 mg/ml  3 mg Oral QHS    vancomycin (VANCOCIN) IVPB  1,750 mg Intravenous Q12H     Continuous Infusions:  PRN Meds:acetaminophen, aluminum-magnesium hydroxide-simethicone, cadexomer iodine, dextrose 10%, dextrose 10%, dextrose, dextrose, glucagon (human recombinant), HYDROmorphone, insulin aspart U-100, melatonin, naloxone, ondansetron, prochlorperazine, simethicone, sodium chloride 0.9%, Pharmacy to dose Vancomycin consult **AND** vancomycin - pharmacy to dose    Review of Systems   Constitutional:  Positive for fatigue. Negative for activity change, appetite change and chills.   HENT:  Negative for congestion, ear pain, postnasal drip, rhinorrhea, sinus pain, sneezing, sore throat and trouble swallowing.    Eyes:  Negative for pain, redness and visual disturbance.   Respiratory:  Positive for cough. Negative for chest tightness, shortness of breath and wheezing.    Cardiovascular:  Negative for chest pain and leg swelling.   Gastrointestinal:  Positive for abdominal pain and constipation. Negative for abdominal distention, diarrhea, nausea and vomiting.   Genitourinary:  Negative for decreased  urine volume, difficulty urinating, dysuria, frequency, hematuria and urgency.   Musculoskeletal:  Positive for gait problem and joint swelling. Negative for arthralgias and back pain.   Skin:  Positive for wound.   Neurological:  Positive for headaches. Negative for dizziness and weakness.   Objective:     Vital Signs (Most Recent):  Temp: 98.9 °F (37.2 °C) (02/25/23 1935)  Pulse: 76 (02/25/23 2029)  Resp: 18 (02/25/23 2029)  BP: 136/64 (02/25/23 1935)  SpO2: (!) 92 % (02/25/23 2029)   Vital Signs (24h Range):  Temp:  [97.4 °F (36.3 °C)-98.9 °F (37.2 °C)] 98.9 °F (37.2 °C)  Pulse:  [67-80] 76  Resp:  [16-18] 18  SpO2:  [92 %-96 %] 92 %  BP: (117-151)/(57-74) 136/64     Weight: 104 kg (229 lb 4.5 oz)  Body mass index is 37.01 kg/m².    Foot Exam    Laboratory:  A1C:   Recent Labs   Lab 09/08/22  0546 09/20/22  0957 12/22/22  2322   HGBA1C 9.1* 8.6* 7.1*     CBC:   Recent Labs   Lab 02/25/23  0654   WBC 8.36   RBC 4.78   HGB 11.0*   HCT 37.8      MCV 79*   MCH 23.0*   MCHC 29.1*     CMP:   Recent Labs   Lab 02/25/23  0654   *   CALCIUM 9.5   ALBUMIN 3.3*   PROT 6.7      K 4.6   CO2 24      BUN 10   CREATININE 0.6   ALKPHOS 77   ALT 13   AST 20   BILITOT 0.3     CRP:   Recent Labs   Lab 02/23/23  1558   CRP 10.1*     ESR:   Recent Labs   Lab 02/23/23  1558   SEDRATE 27*     Microbiology Results (last 7 days)       Procedure Component Value Units Date/Time    Blood Culture #1 **CANNOT BE ORDERED STAT** [[id number]] Collected: 02/23/23 1614    Order Status: Completed Specimen: Blood from Peripheral, Antecubital, Left Updated: 02/25/23 1703     Blood Culture, Routine No Growth to date      No Growth to date      No Growth to date    Blood Culture #2 **CANNOT BE ORDERED STAT** [[id number]] Collected: 02/23/23 1558    Order Status: Completed Specimen: Blood from Peripheral, Antecubital, Right Updated: 02/25/23 1703     Blood Culture, Routine No Growth to date      No Growth to date      No Growth to  date    Aerobic culture [[id number]]  (Abnormal) Collected: 02/24/23 0936    Order Status: Completed Specimen: Wound from Foot, Left Updated: 02/25/23 0825     Aerobic Bacterial Culture GRAM NEGATIVE PEEWEE, NON-LACTOSE   Rare  Identification and susceptibility pending      Gram stain [[id number]] Collected: 02/24/23 0936    Order Status: Completed Specimen: Wound from Foot, Left Updated: 02/24/23 1226     Gram Stain Result No WBC's      No organisms seen    Culture, Anaerobe [[id number]] Collected: 02/24/23 0936    Order Status: Sent Specimen: Wound from Foot, Left Updated: 02/24/23 0959          All pertinent labs reviewed within the last 24 hours.    Diagnostic Results:  I have reviewed all pertinent imaging results/findings within the past 24 hours.    MRI L foot with concern for OM at inferior cuboid.       Assessment/Plan:     ID  * Osteomyelitis of left foot  L foot MRI concerning for OM at the inferior cuboid in charcot foot with large plantar ulceration x several years  S/P Bedside debridement 2/24, wound cultures in progress with prelim non- GRNs.     Reviewed treatment options to patient once more including aggressive offloading and wound care with bone biopsy directed extended course of antibiotics which has failed several times or proximal amputation (BKA). Pt and her family are leaning towards continued abx and wound care. Patient requests return tomorrow but likely wants antibiotics  Plan to review and confirm plan, including bedside bone biopsy of cuboid with considerations of initiation of wound vac   Abx per ID, appreciate recommendations   Nursing wound care routine in   Strict NWB Left foot   Podiatry will follow        Ariel Szymanski DPM  Podiatry  [facility] [place] - [facility] Surg

## 2023-02-26 NOTE — ASSESSMENT & PLAN NOTE
"Erythema, edema, with worsening pain.  Leukocytosis and elevated lactic acid elevated inflammatory markers on lab work.  Blood cultures are pending.  Continue IV antibiotics.  Podiatry and ID consulted.    02/24: Leukocytosis resolved; s/p bedside debridement by Podiatry; MRI foot shows "Similar appearance of extensive underlying Charcot changes at the midfoot and metatarsal bases with concern for osteomyelitis at the inferior cuboid." Cultures obtained and pending. ID recommending bone biopsy and holding abx to increase yield of culture.     02/25: Continues to endorse foot pain.  Plantar aspect of dressing noted to be what with drainage.  Pseudomonas growing.    Patient has received 6 weeks of antibiotics for MRSA at previous hospitals.   Current culture growing out soon unclear if colonizer or if active infection.    Patient appears stable will hold cefepime until after biopsy tomorrow 02/27/2023.    "

## 2023-02-26 NOTE — ASSESSMENT & PLAN NOTE
"Erythema, edema, with worsening pain.  Leukocytosis and elevated lactic acid elevated inflammatory markers on lab work.  Blood cultures are pending.  Continue IV antibiotics.  Podiatry and ID consulted.    02/24: Leukocytosis resolved; s/p bedside debridement by Podiatry; MRI foot shows "Similar appearance of extensive underlying Charcot changes at the midfoot and metatarsal bases with concern for osteomyelitis at the inferior cuboid." Cultures obtained and pending. ID recommending bone biopsy and holding abx to increase yield of culture.     02/25: Continues to endorse foot pain.  Plantar aspect of dressing noted to be what with drainage.  Pseudomonas growing.    Patient has received 6 weeks of antibiotics for MRSA at previous hospitals.   Current culture growing out Pseudomonas, unclear if colonizer or if active infection.    Patient appears stable will hold cefepime until after biopsy tomorrow 02/27/2023.    "

## 2023-02-26 NOTE — PROGRESS NOTES
Therapy with vancomycin complete and/or consult discontinued by provider.  Pharmacy will sign off, please re-consult as needed.    Thank you for the consult,   Anna Banks  02/26/2023

## 2023-02-26 NOTE — ASSESSMENT & PLAN NOTE
L foot MRI concerning for OM at the inferior cuboid in charcot foot with large plantar ulceration x several years  S/P Bedside debridement 2/24, wound cultures in progress with prelim non- GRNs.     Presented treatment options to patient once more including aggressive offloading and wound care with bone biopsy directed extended course of antibiotics which has failure several times or proximal amputation (BKA). Pt and her family are leaning towards continued abx and wound care. Patient requests return tomorrow but likely wants antibiotics  Plan to review and confirm plan, including bedside bone biopsy of cuboid with considerations of initiation of wound vac   Abx per ID, appreciate recommendations   Nursing wound care routine in   Strict NWB Left foot   Podiatry will follow

## 2023-02-26 NOTE — SUBJECTIVE & OBJECTIVE
Interval History:   NAEON. Denies SOB or CP. Eating okay. UOP wnl.   +vaginal discharge/fungal      Review of Systems   Respiratory:  Negative for shortness of breath.    Cardiovascular:  Negative for chest pain.   Musculoskeletal:  Positive for arthralgias and gait problem.   Skin:  Positive for color change and wound.       Objective:     Vital Signs (Most Recent):  Temp: 98.6 °F (37 °C) (02/26/23 1612)  Pulse: 71 (02/26/23 1612)  Resp: 18 (02/26/23 1612)  BP: (!) 157/70 (02/26/23 1612)  SpO2: 97 % (02/26/23 1612)   Vital Signs (24h Range):  Temp:  [98 °F (36.7 °C)-98.9 °F (37.2 °C)] 98.6 °F (37 °C)  Pulse:  [] 71  Resp:  [17-18] 18  SpO2:  [92 %-98 %] 97 %  BP: (104-157)/(56-70) 157/70     Weight: 104 kg (229 lb 4.5 oz)  Body mass index is 37.01 kg/m².    Intake/Output Summary (Last 24 hours) at 2/26/2023 1734  Last data filed at 2/26/2023 1321  Gross per 24 hour   Intake 1542.81 ml   Output --   Net 1542.81 ml        Physical Exam  Vitals reviewed.   Constitutional:       General: She is not in acute distress.     Appearance: Normal appearance. She is obese. She is not ill-appearing.   HENT:      Head: Normocephalic and atraumatic.      Right Ear: External ear normal.      Left Ear: External ear normal.      Nose: Nose normal.      Mouth/Throat:      Mouth: Mucous membranes are moist.      Pharynx: Oropharynx is clear.   Eyes:      General:         Right eye: No discharge.         Left eye: No discharge.      Extraocular Movements: Extraocular movements intact.      Pupils: Pupils are equal, round, and reactive to light.   Cardiovascular:      Rate and Rhythm: Normal rate.      Pulses: Normal pulses.      Heart sounds: Normal heart sounds. No murmur heard.    No friction rub. No gallop.   Pulmonary:      Effort: Pulmonary effort is normal. No respiratory distress.      Breath sounds: Normal breath sounds. No wheezing.   Abdominal:      General: Bowel sounds are normal. There is no distension.       Palpations: Abdomen is soft.      Tenderness: There is no abdominal tenderness. There is no guarding.   Musculoskeletal:         General: No swelling. Normal range of motion.      Cervical back: Normal range of motion.   Feet:      Left foot:      Skin integrity: Ulcer and skin breakdown present.      Comments: Left foot and complains of 9/10 pin with drainage noted on plantar aspect of dressing.  Skin:     General: Skin is warm.      Capillary Refill: Capillary refill takes less than 2 seconds.   Neurological:      General: No focal deficit present.      Mental Status: She is alert and oriented to person, place, and time.      Motor: No weakness.           Recent Results (from the past 24 hour(s))   POCT glucose    Collection Time: 02/25/23  7:37 PM   Result Value Ref Range    POCT Glucose 257 (H) 70 - 110 mg/dL   POCT glucose    Collection Time: 02/25/23 11:55 PM   Result Value Ref Range    POCT Glucose 238 (H) 70 - 110 mg/dL   Comprehensive Metabolic Panel (CMP)    Collection Time: 02/26/23  4:33 AM   Result Value Ref Range    Sodium 140 136 - 145 mmol/L    Potassium 4.7 3.5 - 5.1 mmol/L    Chloride 102 95 - 110 mmol/L    CO2 26 23 - 29 mmol/L    Glucose 208 (H) 70 - 110 mg/dL    BUN 12 6 - 20 mg/dL    Creatinine 0.7 0.5 - 1.4 mg/dL    Calcium 9.0 8.7 - 10.5 mg/dL    Total Protein 6.3 6.0 - 8.4 g/dL    Albumin 3.1 (L) 3.5 - 5.2 g/dL    Total Bilirubin 0.3 0.1 - 1.0 mg/dL    Alkaline Phosphatase 72 55 - 135 U/L    AST 12 10 - 40 U/L    ALT 13 10 - 44 U/L    Anion Gap 12 8 - 16 mmol/L    eGFR >60 >60 mL/min/1.73 m^2   CBC with Automated Differential    Collection Time: 02/26/23  4:33 AM   Result Value Ref Range    WBC 12.09 3.90 - 12.70 K/uL    RBC 4.36 4.00 - 5.40 M/uL    Hemoglobin 9.9 (L) 12.0 - 16.0 g/dL    Hematocrit 33.6 (L) 37.0 - 48.5 %    MCV 77 (L) 82 - 98 fL    MCH 22.7 (L) 27.0 - 31.0 pg    MCHC 29.5 (L) 32.0 - 36.0 g/dL    RDW 17.9 (H) 11.5 - 14.5 %    Platelets 698 (H) 150 - 450 K/uL    MPV 10.1 9.2 -  12.9 fL    Immature Granulocytes 0.5 0.0 - 0.5 %    Gran # (ANC) 4.7 1.8 - 7.7 K/uL    Immature Grans (Abs) 0.06 (H) 0.00 - 0.04 K/uL    Lymph # 4.9 (H) 1.0 - 4.8 K/uL    Mono # 1.7 (H) 0.3 - 1.0 K/uL    Eos # 0.7 (H) 0.0 - 0.5 K/uL    Baso # 0.11 0.00 - 0.20 K/uL    nRBC 0 0 /100 WBC    Gran % 38.5 38.0 - 73.0 %    Lymph % 40.4 18.0 - 48.0 %    Mono % 13.8 4.0 - 15.0 %    Eosinophil % 5.9 0.0 - 8.0 %    Basophil % 0.9 0.0 - 1.9 %    Platelet Estimate Increased (A)     Differential Method Automated    POCT glucose    Collection Time: 02/26/23  7:41 AM   Result Value Ref Range    POCT Glucose 183 (H) 70 - 110 mg/dL   POCT glucose    Collection Time: 02/26/23 11:42 AM   Result Value Ref Range    POCT Glucose 302 (H) 70 - 110 mg/dL   POCT glucose    Collection Time: 02/26/23  4:12 PM   Result Value Ref Range    POCT Glucose 245 (H) 70 - 110 mg/dL       Microbiology Results (last 7 days)       Procedure Component Value Units Date/Time    Blood Culture #1 **CANNOT BE ORDERED STAT** [[id number]] Collected: 02/23/23 1614    Order Status: Completed Specimen: Blood from Peripheral, Antecubital, Left Updated: 02/26/23 1703     Blood Culture, Routine No Growth to date      No Growth to date      No Growth to date      No Growth to date    Blood Culture #2 **CANNOT BE ORDERED STAT** [[id number]] Collected: 02/23/23 1558    Order Status: Completed Specimen: Blood from Peripheral, Antecubital, Right Updated: 02/26/23 1703     Blood Culture, Routine No Growth to date      No Growth to date      No Growth to date      No Growth to date    Culture, Anaerobe [[id number]] Collected: 02/24/23 0936    Order Status: Completed Specimen: Wound from Foot, Left Updated: 02/26/23 0709     Anaerobic Culture Culture in progress    Aerobic culture [[id number]]  (Abnormal)  (Susceptibility) Collected: 02/24/23 0936    Order Status: Completed Specimen: Wound from Foot, Left Updated: 02/26/23 0603     Aerobic Bacterial Culture PSEUDOMONAS  AERUGINOSA  Rare      Gram stain [[id number]] Collected: 02/24/23 0936    Order Status: Completed Specimen: Wound from Foot, Left Updated: 02/24/23 1226     Gram Stain Result No WBC's      No organisms seen             Imaging Results              X-Ray Chest 1 View (Final result)  Result time 02/23/23 18:08:09      Final result by Shalom Bro MD (02/23/23 18:08:09)                   Impression:      No acute cardiopulmonary process identified.      Electronically signed by: Shalom Bro MD  Date:    02/23/2023  Time:    18:08               Narrative:    EXAMINATION:  XR CHEST 1 VIEW    CLINICAL HISTORY:  Systemic inflammatory response syndrome (sirs) of non-infectious origin without acute organ dysfunction    TECHNIQUE:  Single frontal view of the chest was performed.    COMPARISON:  01/11/2023.    FINDINGS:  Cardiac silhouette is normal in size.  Lungs are symmetrically expanded.  No evidence of focal consolidative process, pneumothorax, or significant pleural effusion.  No acute osseous abnormality identified.                                       US Lower Extremity Veins Left (Final result)  Result time 02/23/23 17:03:35      Final result by Shalom Bro MD (02/23/23 17:03:35)                   Impression:      No evidence of left lower extremity deep venous thrombosis.      Electronically signed by: Shalom Bro MD  Date:    02/23/2023  Time:    17:03               Narrative:    EXAMINATION:  US LOWER EXTREMITY VEINS LEFT    CLINICAL HISTORY:  Other specified soft tissue disorders    TECHNIQUE:  Duplex and color flow Doppler evaluation of the left lower extremity veins was performed.    COMPARISON:  01/09/2023.    FINDINGS:  No evidence of clot involving the bilateral common femoral veins or left greater saphenous, femoral, popliteal, peroneal, anterior and posterior tibial veins.  All venous structures demonstrate normal respiratory phasicity and augment adequately.  No evidence of soft tissue  mass or Baker's cyst.                                       X-Ray Foot Complete Left (Final result)  Result time 02/23/23 16:34:20      Final result by Tu Santos Jr., MD (02/23/23 16:34:20)                   Impression:      No radiographic evidence of osteomyelitis      Electronically signed by: Tu Restrepo Jr  Date:    02/23/2023  Time:    16:34               Narrative:    EXAMINATION:  XR FOOT COMPLETE 3 VIEW LEFT    CLINICAL HISTORY:  Type 2 diabetes mellitus with foot ulcer    TECHNIQUE:  XR FOOT COMPLETE 3 VIEW LEFT    COMPARISON:  01/09/2023    FINDINGS:  Large area of soft tissue ulceration is seen involving the plantar aspect of the midfoot with Charcot neuropathic changes seen throughout the midfoot.  No gross bone erosion, destruction, or aggressive periosteal reaction.  Nonspecific soft tissue swelling of the foot.

## 2023-02-26 NOTE — SUBJECTIVE & OBJECTIVE
Subjective:     Interval History: NAEON. Afebrile. Leukocytosis resolved. Patient notes improvement in pain. Dressing clean, intact, minor serous strike thru. L ft mri with concern for osteomyelitis at the inferior cuboid. Wound culture: GNR non-lactose .         Scheduled Meds:   divalproex  500 mg Oral Daily    enoxaparin  40 mg Subcutaneous Daily    insulin detemir U-100  15 Units Subcutaneous QHS    lisinopriL  10 mg Oral Daily    metoprolol tartrate  25 mg Oral BID    polyethylene glycol  17 g Oral Daily    pravastatin  40 mg Oral QHS    risperidone 1 mg/ml  3 mg Oral QHS    vancomycin (VANCOCIN) IVPB  1,750 mg Intravenous Q12H     Continuous Infusions:  PRN Meds:acetaminophen, aluminum-magnesium hydroxide-simethicone, cadexomer iodine, dextrose 10%, dextrose 10%, dextrose, dextrose, glucagon (human recombinant), HYDROmorphone, insulin aspart U-100, melatonin, naloxone, ondansetron, prochlorperazine, simethicone, sodium chloride 0.9%, Pharmacy to dose Vancomycin consult **AND** vancomycin - pharmacy to dose    Review of Systems   Constitutional:  Positive for fatigue. Negative for activity change, appetite change and chills.   HENT:  Negative for congestion, ear pain, postnasal drip, rhinorrhea, sinus pain, sneezing, sore throat and trouble swallowing.    Eyes:  Negative for pain, redness and visual disturbance.   Respiratory:  Positive for cough. Negative for chest tightness, shortness of breath and wheezing.    Cardiovascular:  Negative for chest pain and leg swelling.   Gastrointestinal:  Positive for abdominal pain and constipation. Negative for abdominal distention, diarrhea, nausea and vomiting.   Genitourinary:  Negative for decreased urine volume, difficulty urinating, dysuria, frequency, hematuria and urgency.   Musculoskeletal:  Positive for gait problem and joint swelling. Negative for arthralgias and back pain.   Skin:  Positive for wound.   Neurological:  Positive for headaches. Negative  for dizziness and weakness.   Objective:     Vital Signs (Most Recent):  Temp: 98.9 °F (37.2 °C) (02/25/23 1935)  Pulse: 76 (02/25/23 2029)  Resp: 18 (02/25/23 2029)  BP: 136/64 (02/25/23 1935)  SpO2: (!) 92 % (02/25/23 2029)   Vital Signs (24h Range):  Temp:  [97.4 °F (36.3 °C)-98.9 °F (37.2 °C)] 98.9 °F (37.2 °C)  Pulse:  [67-80] 76  Resp:  [16-18] 18  SpO2:  [92 %-96 %] 92 %  BP: (117-151)/(57-74) 136/64     Weight: 104 kg (229 lb 4.5 oz)  Body mass index is 37.01 kg/m².    Foot Exam    Laboratory:  A1C:   Recent Labs   Lab 09/08/22  0546 09/20/22  0957 12/22/22  2322   HGBA1C 9.1* 8.6* 7.1*     CBC:   Recent Labs   Lab 02/25/23  0654   WBC 8.36   RBC 4.78   HGB 11.0*   HCT 37.8      MCV 79*   MCH 23.0*   MCHC 29.1*     CMP:   Recent Labs   Lab 02/25/23  0654   *   CALCIUM 9.5   ALBUMIN 3.3*   PROT 6.7      K 4.6   CO2 24      BUN 10   CREATININE 0.6   ALKPHOS 77   ALT 13   AST 20   BILITOT 0.3     CRP:   Recent Labs   Lab 02/23/23  1558   CRP 10.1*     ESR:   Recent Labs   Lab 02/23/23  1558   SEDRATE 27*     Microbiology Results (last 7 days)       Procedure Component Value Units Date/Time    Blood Culture #1 **CANNOT BE ORDERED STAT** [[id number]] Collected: 02/23/23 1614    Order Status: Completed Specimen: Blood from Peripheral, Antecubital, Left Updated: 02/25/23 1703     Blood Culture, Routine No Growth to date      No Growth to date      No Growth to date    Blood Culture #2 **CANNOT BE ORDERED STAT** [[id number]] Collected: 02/23/23 1558    Order Status: Completed Specimen: Blood from Peripheral, Antecubital, Right Updated: 02/25/23 1703     Blood Culture, Routine No Growth to date      No Growth to date      No Growth to date    Aerobic culture [[id number]]  (Abnormal) Collected: 02/24/23 0936    Order Status: Completed Specimen: Wound from Foot, Left Updated: 02/25/23 0825     Aerobic Bacterial Culture GRAM NEGATIVE PEEWEE, NON-LACTOSE   Rare  Identification and  susceptibility pending      Gram stain [[id number]] Collected: 02/24/23 0936    Order Status: Completed Specimen: Wound from Foot, Left Updated: 02/24/23 1226     Gram Stain Result No WBC's      No organisms seen    Culture, Anaerobe [[id number]] Collected: 02/24/23 0936    Order Status: Sent Specimen: Wound from Foot, Left Updated: 02/24/23 0959          All pertinent labs reviewed within the last 24 hours.    Diagnostic Results:  I have reviewed all pertinent imaging results/findings within the past 24 hours.    MRI L foot with concern for OM at inferior cuboid.

## 2023-02-26 NOTE — PROGRESS NOTES
"[facility] Medicine  Progress Note    Patient Name: Audrey Natarajan  MRN: [id number]  Patient Class: IP- Inpatient   Admission Date: 2/23/2023  Length of Stay: 1 days  Attending Physician: Jarocho Weldon MD  Primary Care Provider: Donaldo Pena MD        Subjective:     Principal Problem:Osteomyelitis of left foot        HPI:  52 y.o. female with hypertension, hyperlipidemia, COPD, bipolar 1 disorder, DM2 presents with a complaint of foot pain.  Appears to be a chronic intermittent problem for some time, associated with erythema and edema of the extremity.  Was on IV vancomycin for awhile.  Has home health wound care.  Sees Podiatry Dr. De Los Santos and Infectious Disease Dr. Jarocho panchal.  Denies fever, chills, cough, SOB, chest pain, palpitations, dizziness, syncope, nausea, vomiting, diarrhea, abdominal pain.  In the ED, she was found to have leukocytosis with elevated lactic acid.  Inflammatory markers are also elevated.  X-ray of the foot an ultrasound of the veins of the lower extremity were unremarkable for acute abnormality.  Blood cultures were obtained and she was started on IV vancomycin in the ED.  Placed in observation.      Overview/Hospital Course:  Audrey Natarajan was placed under observation for management of left plantar foot ulceration, and need for further evaluation by podiatry and infectious disease.    Podiatry evaluated the patient and conducted a bedside debridement.  Cultures have been sent off and are pending.  Options were discussed with the patient by Podiatry which included amputation versus IV antibiotics for 6 weeks.  MRI midfoot obtained which shows: "Similar appearance of extensive underlying Charcot changes at the midfoot and metatarsal bases with concern for osteomyelitis at the inferior cuboid."  Podiatry evaluated the patient and recommends bone biopsy as well as holding antibiotics. Midline placed due to poor venous access.     Patient has received 6 weeks of " antibiotics for MRSA at previous hospitals.  Current culture growing out Pseudomonas, unclear if colonizer or if active infection.  Patient appears stable will hold cefepime until after biopsy tomorrow 02/27/2023.  One dose of fluconazole, and miconazole inserts daily for 7 days per patient request.      Interval History:   NAEON. Denies SOB or CP. Eating okay. UOP wnl.   +vaginal discharge/fungal      Review of Systems   Respiratory:  Negative for shortness of breath.    Cardiovascular:  Negative for chest pain.   Musculoskeletal:  Positive for arthralgias and gait problem.   Skin:  Positive for color change and wound.       Objective:     Vital Signs (Most Recent):  Temp: 98.6 °F (37 °C) (02/26/23 1612)  Pulse: 71 (02/26/23 1612)  Resp: 18 (02/26/23 1612)  BP: (!) 157/70 (02/26/23 1612)  SpO2: 97 % (02/26/23 1612)   Vital Signs (24h Range):  Temp:  [98 °F (36.7 °C)-98.9 °F (37.2 °C)] 98.6 °F (37 °C)  Pulse:  [] 71  Resp:  [17-18] 18  SpO2:  [92 %-98 %] 97 %  BP: (104-157)/(56-70) 157/70     Weight: 104 kg (229 lb 4.5 oz)  Body mass index is 37.01 kg/m².    Intake/Output Summary (Last 24 hours) at 2/26/2023 1734  Last data filed at 2/26/2023 1321  Gross per 24 hour   Intake 1542.81 ml   Output --   Net 1542.81 ml        Physical Exam  Vitals reviewed.   Constitutional:       General: She is not in acute distress.     Appearance: Normal appearance. She is obese. She is not ill-appearing.   HENT:      Head: Normocephalic and atraumatic.      Right Ear: External ear normal.      Left Ear: External ear normal.      Nose: Nose normal.      Mouth/Throat:      Mouth: Mucous membranes are moist.      Pharynx: Oropharynx is clear.   Eyes:      General:         Right eye: No discharge.         Left eye: No discharge.      Extraocular Movements: Extraocular movements intact.      Pupils: Pupils are equal, round, and reactive to light.   Cardiovascular:      Rate and Rhythm: Normal rate.      Pulses: Normal pulses.       Heart sounds: Normal heart sounds. No murmur heard.    No friction rub. No gallop.   Pulmonary:      Effort: Pulmonary effort is normal. No respiratory distress.      Breath sounds: Normal breath sounds. No wheezing.   Abdominal:      General: Bowel sounds are normal. There is no distension.      Palpations: Abdomen is soft.      Tenderness: There is no abdominal tenderness. There is no guarding.   Musculoskeletal:         General: No swelling. Normal range of motion.      Cervical back: Normal range of motion.   Feet:      Left foot:      Skin integrity: Ulcer and skin breakdown present.      Comments: Left foot and complains of 9/10 pin with drainage noted on plantar aspect of dressing.  Skin:     General: Skin is warm.      Capillary Refill: Capillary refill takes less than 2 seconds.   Neurological:      General: No focal deficit present.      Mental Status: She is alert and oriented to person, place, and time.      Motor: No weakness.           Recent Results (from the past 24 hour(s))   POCT glucose    Collection Time: 02/25/23  7:37 PM   Result Value Ref Range    POCT Glucose 257 (H) 70 - 110 mg/dL   POCT glucose    Collection Time: 02/25/23 11:55 PM   Result Value Ref Range    POCT Glucose 238 (H) 70 - 110 mg/dL   Comprehensive Metabolic Panel (CMP)    Collection Time: 02/26/23  4:33 AM   Result Value Ref Range    Sodium 140 136 - 145 mmol/L    Potassium 4.7 3.5 - 5.1 mmol/L    Chloride 102 95 - 110 mmol/L    CO2 26 23 - 29 mmol/L    Glucose 208 (H) 70 - 110 mg/dL    BUN 12 6 - 20 mg/dL    Creatinine 0.7 0.5 - 1.4 mg/dL    Calcium 9.0 8.7 - 10.5 mg/dL    Total Protein 6.3 6.0 - 8.4 g/dL    Albumin 3.1 (L) 3.5 - 5.2 g/dL    Total Bilirubin 0.3 0.1 - 1.0 mg/dL    Alkaline Phosphatase 72 55 - 135 U/L    AST 12 10 - 40 U/L    ALT 13 10 - 44 U/L    Anion Gap 12 8 - 16 mmol/L    eGFR >60 >60 mL/min/1.73 m^2   CBC with Automated Differential    Collection Time: 02/26/23  4:33 AM   Result Value Ref Range    WBC  12.09 3.90 - 12.70 K/uL    RBC 4.36 4.00 - 5.40 M/uL    Hemoglobin 9.9 (L) 12.0 - 16.0 g/dL    Hematocrit 33.6 (L) 37.0 - 48.5 %    MCV 77 (L) 82 - 98 fL    MCH 22.7 (L) 27.0 - 31.0 pg    MCHC 29.5 (L) 32.0 - 36.0 g/dL    RDW 17.9 (H) 11.5 - 14.5 %    Platelets 698 (H) 150 - 450 K/uL    MPV 10.1 9.2 - 12.9 fL    Immature Granulocytes 0.5 0.0 - 0.5 %    Gran # (ANC) 4.7 1.8 - 7.7 K/uL    Immature Grans (Abs) 0.06 (H) 0.00 - 0.04 K/uL    Lymph # 4.9 (H) 1.0 - 4.8 K/uL    Mono # 1.7 (H) 0.3 - 1.0 K/uL    Eos # 0.7 (H) 0.0 - 0.5 K/uL    Baso # 0.11 0.00 - 0.20 K/uL    nRBC 0 0 /100 WBC    Gran % 38.5 38.0 - 73.0 %    Lymph % 40.4 18.0 - 48.0 %    Mono % 13.8 4.0 - 15.0 %    Eosinophil % 5.9 0.0 - 8.0 %    Basophil % 0.9 0.0 - 1.9 %    Platelet Estimate Increased (A)     Differential Method Automated    POCT glucose    Collection Time: 02/26/23  7:41 AM   Result Value Ref Range    POCT Glucose 183 (H) 70 - 110 mg/dL   POCT glucose    Collection Time: 02/26/23 11:42 AM   Result Value Ref Range    POCT Glucose 302 (H) 70 - 110 mg/dL   POCT glucose    Collection Time: 02/26/23  4:12 PM   Result Value Ref Range    POCT Glucose 245 (H) 70 - 110 mg/dL       Microbiology Results (last 7 days)       Procedure Component Value Units Date/Time    Blood Culture #1 **CANNOT BE ORDERED STAT** [[id number]] Collected: 02/23/23 1614    Order Status: Completed Specimen: Blood from Peripheral, Antecubital, Left Updated: 02/26/23 1703     Blood Culture, Routine No Growth to date      No Growth to date      No Growth to date      No Growth to date    Blood Culture #2 **CANNOT BE ORDERED STAT** [[id number]] Collected: 02/23/23 1558    Order Status: Completed Specimen: Blood from Peripheral, Antecubital, Right Updated: 02/26/23 1703     Blood Culture, Routine No Growth to date      No Growth to date      No Growth to date      No Growth to date    Culture, Anaerobe [[id number]] Collected: 02/24/23 0936    Order Status: Completed Specimen: Wound  from Foot, Left Updated: 02/26/23 0709     Anaerobic Culture Culture in progress    Aerobic culture [[id number]]  (Abnormal)  (Susceptibility) Collected: 02/24/23 0936    Order Status: Completed Specimen: Wound from Foot, Left Updated: 02/26/23 0603     Aerobic Bacterial Culture PSEUDOMONAS AERUGINOSA  Rare      Gram stain [[id number]] Collected: 02/24/23 0936    Order Status: Completed Specimen: Wound from Foot, Left Updated: 02/24/23 1226     Gram Stain Result No WBC's      No organisms seen             Imaging Results              X-Ray Chest 1 View (Final result)  Result time 02/23/23 18:08:09      Final result by Shalom Bro MD (02/23/23 18:08:09)                   Impression:      No acute cardiopulmonary process identified.      Electronically signed by: Shalom Bro MD  Date:    02/23/2023  Time:    18:08               Narrative:    EXAMINATION:  XR CHEST 1 VIEW    CLINICAL HISTORY:  Systemic inflammatory response syndrome (sirs) of non-infectious origin without acute organ dysfunction    TECHNIQUE:  Single frontal view of the chest was performed.    COMPARISON:  01/11/2023.    FINDINGS:  Cardiac silhouette is normal in size.  Lungs are symmetrically expanded.  No evidence of focal consolidative process, pneumothorax, or significant pleural effusion.  No acute osseous abnormality identified.                                       US Lower Extremity Veins Left (Final result)  Result time 02/23/23 17:03:35      Final result by Shalom Bro MD (02/23/23 17:03:35)                   Impression:      No evidence of left lower extremity deep venous thrombosis.      Electronically signed by: Shalom Bro MD  Date:    02/23/2023  Time:    17:03               Narrative:    EXAMINATION:  US LOWER EXTREMITY VEINS LEFT    CLINICAL HISTORY:  Other specified soft tissue disorders    TECHNIQUE:  Duplex and color flow Doppler evaluation of the left lower extremity veins was  "performed.    COMPARISON:  01/09/2023.    FINDINGS:  No evidence of clot involving the bilateral common femoral veins or left greater saphenous, femoral, popliteal, peroneal, anterior and posterior tibial veins.  All venous structures demonstrate normal respiratory phasicity and augment adequately.  No evidence of soft tissue mass or Baker's cyst.                                       X-Ray Foot Complete Left (Final result)  Result time 02/23/23 16:34:20      Final result by Tu Santos Jr., MD (02/23/23 16:34:20)                   Impression:      No radiographic evidence of osteomyelitis      Electronically signed by: Tu Restrepo Jr  Date:    02/23/2023  Time:    16:34               Narrative:    EXAMINATION:  XR FOOT COMPLETE 3 VIEW LEFT    CLINICAL HISTORY:  Type 2 diabetes mellitus with foot ulcer    TECHNIQUE:  XR FOOT COMPLETE 3 VIEW LEFT    COMPARISON:  01/09/2023    FINDINGS:  Large area of soft tissue ulceration is seen involving the plantar aspect of the midfoot with Charcot neuropathic changes seen throughout the midfoot.  No gross bone erosion, destruction, or aggressive periosteal reaction.  Nonspecific soft tissue swelling of the foot.                                            Assessment/Plan:      * Osteomyelitis of left foot  Erythema, edema, with worsening pain.  Leukocytosis and elevated lactic acid elevated inflammatory markers on lab work.  Blood cultures are pending.  Continue IV antibiotics.  Podiatry and ID consulted.    02/24: Leukocytosis resolved; s/p bedside debridement by Podiatry; MRI foot shows "Similar appearance of extensive underlying Charcot changes at the midfoot and metatarsal bases with concern for osteomyelitis at the inferior cuboid." Cultures obtained and pending. ID recommending bone biopsy and holding abx to increase yield of culture.     02/25: Continues to endorse foot pain.  Plantar aspect of dressing noted to be what with drainage.  Pseudomonas " growing.    Patient has received 6 weeks of antibiotics for MRSA at previous hospitals.   Current culture growing out Pseudomonas, unclear if colonizer or if active infection.    Patient appears stable will hold cefepime until after biopsy tomorrow 02/27/2023.      SHAWN (obstructive sleep apnea)  CPAP QHS    Type 2 diabetes mellitus  Patient's FSGs are controlled on current medication regimen.  Last A1c reviewed-   Lab Results   Component Value Date    HGBA1C 7.1 (H) 12/22/2022     Most recent fingerstick glucose reviewed-   Recent Labs   Lab 02/24/23  1700 02/24/23  1946 02/25/23  0743 02/25/23  1159   POCTGLUCOSE 180* 217* 157* 194*     Current correctional scale  Medium  Maintain anti-hyperglycemic dose as follows-   Antihyperglycemics (From admission, onward)      Start     Stop Route Frequency Ordered    02/23/23 2330  insulin detemir U-100 pen 15 Units         -- SubQ Nightly 02/23/23 2229 02/23/23 2328  insulin aspart U-100 pen 1-10 Units         -- SubQ Before meals & nightly PRN 02/23/23 2229        Diabetic Diet  Hold Oral hypoglycemics while patient is in the hospital.    Bipolar 1 disorder  Continue home regimen of risperidone    Tobacco abuse  5 minutes spent counseling the patient on smoking cessation and she is not currently ready to stop smoking. She will be monitored for withdrawal.  She will be provided with additional smoking cessation counseling prior to discharge.    Hyperlipidemia  Continue statin    COPD (chronic obstructive pulmonary disease)  P.r.n. nebs    Essential hypertension  Well controlled, continue home medications and monitor blood pressure, adjust as needed.       VTE Risk Mitigation (From admission, onward)           Ordered     enoxaparin injection 40 mg  Daily         02/23/23 2229     IP VTE HIGH RISK PATIENT  Once         02/23/23 2229     Place sequential compression device  Until discontinued         02/23/23 2229                    Discharge Planning   HUMBERTO:      Code  Status: Full Code   Is the patient medically ready for discharge?:     Reason for patient still in hospital (select all that apply): Patient trending condition and Treatment  Discharge Plan A: Home                  Jarocho Weldon MD  Department of Hospital Medicine   [facility]

## 2023-02-26 NOTE — NURSING
Pt is AAOx4 and denies pain, 0/10 Pain Scale. Pt compliant with all TX modalities without incidence and remains free from falls/injuries R/T the adherence of all safety protocols.

## 2023-02-26 NOTE — NURSING
[facility] [place]  Nurses Note -- 4 Eyes      2/25/2023       Skin assessed on: Saturday      [x] No Pressure Injuries Present    []Prevention Measures Documented    [] Yes LDA  for Pressure Injury Previously documented     [] Yes New Pressure Injury Discovered   [] LDA for New Pressure Injury Added      Attending RN:  Marj Woods LPN     Second RN:  Mercedes Arriola

## 2023-02-27 ENCOUNTER — ANESTHESIA EVENT (OUTPATIENT)
Dept: SURGERY | Facility: HOSPITAL | Age: 53
DRG: 623 | End: 2023-02-27
Payer: MEDICAID

## 2023-02-27 LAB
ALBUMIN SERPL BCP-MCNC: 3.1 G/DL (ref 3.5–5.2)
ALP SERPL-CCNC: 75 U/L (ref 55–135)
ALT SERPL W/O P-5'-P-CCNC: 10 U/L (ref 10–44)
ANION GAP SERPL CALC-SCNC: 14 MMOL/L (ref 8–16)
AST SERPL-CCNC: 29 U/L (ref 10–40)
BACTERIA BLD CULT: NORMAL
BACTERIA BLD CULT: NORMAL
BASOPHILS # BLD AUTO: 0.11 K/UL (ref 0–0.2)
BASOPHILS NFR BLD: 0.9 % (ref 0–1.9)
BILIRUB SERPL-MCNC: 0.3 MG/DL (ref 0.1–1)
BUN SERPL-MCNC: 8 MG/DL (ref 6–20)
CALCIUM SERPL-MCNC: 9.2 MG/DL (ref 8.7–10.5)
CHLORIDE SERPL-SCNC: 102 MMOL/L (ref 95–110)
CO2 SERPL-SCNC: 24 MMOL/L (ref 23–29)
CREAT SERPL-MCNC: 0.6 MG/DL (ref 0.5–1.4)
DIFFERENTIAL METHOD: ABNORMAL
EOSINOPHIL # BLD AUTO: 0.6 K/UL (ref 0–0.5)
EOSINOPHIL NFR BLD: 5.2 % (ref 0–8)
ERYTHROCYTE [DISTWIDTH] IN BLOOD BY AUTOMATED COUNT: 18 % (ref 11.5–14.5)
EST. GFR  (NO RACE VARIABLE): >60 ML/MIN/1.73 M^2
GLUCOSE SERPL-MCNC: 168 MG/DL (ref 70–110)
HCT VFR BLD AUTO: 35.2 % (ref 37–48.5)
HGB BLD-MCNC: 10.6 G/DL (ref 12–16)
IMM GRANULOCYTES # BLD AUTO: 0.04 K/UL (ref 0–0.04)
IMM GRANULOCYTES NFR BLD AUTO: 0.3 % (ref 0–0.5)
LYMPHOCYTES # BLD AUTO: 5.4 K/UL (ref 1–4.8)
LYMPHOCYTES NFR BLD: 43.8 % (ref 18–48)
MAGNESIUM SERPL-MCNC: 1.5 MG/DL (ref 1.6–2.6)
MCH RBC QN AUTO: 23.3 PG (ref 27–31)
MCHC RBC AUTO-ENTMCNC: 30.1 G/DL (ref 32–36)
MCV RBC AUTO: 77 FL (ref 82–98)
MONOCYTES # BLD AUTO: 1.4 K/UL (ref 0.3–1)
MONOCYTES NFR BLD: 11.6 % (ref 4–15)
NEUTROPHILS # BLD AUTO: 4.7 K/UL (ref 1.8–7.7)
NEUTROPHILS NFR BLD: 38.2 % (ref 38–73)
NRBC BLD-RTO: 0 /100 WBC
PHOSPHATE SERPL-MCNC: 4.2 MG/DL (ref 2.7–4.5)
PLATELET # BLD AUTO: 489 K/UL (ref 150–450)
PLATELET BLD QL SMEAR: ABNORMAL
PMV BLD AUTO: 10.5 FL (ref 9.2–12.9)
POCT GLUCOSE: 146 MG/DL (ref 70–110)
POCT GLUCOSE: 218 MG/DL (ref 70–110)
POCT GLUCOSE: 259 MG/DL (ref 70–110)
POCT GLUCOSE: 276 MG/DL (ref 70–110)
POTASSIUM SERPL-SCNC: 4.7 MMOL/L (ref 3.5–5.1)
PROT SERPL-MCNC: 6.9 G/DL (ref 6–8.4)
RBC # BLD AUTO: 4.55 M/UL (ref 4–5.4)
SODIUM SERPL-SCNC: 140 MMOL/L (ref 136–145)
WBC # BLD AUTO: 12.37 K/UL (ref 3.9–12.7)

## 2023-02-27 PROCEDURE — 80053 COMPREHEN METABOLIC PANEL: CPT | Performed by: STUDENT IN AN ORGANIZED HEALTH CARE EDUCATION/TRAINING PROGRAM

## 2023-02-27 PROCEDURE — 99233 SBSQ HOSP IP/OBS HIGH 50: CPT | Mod: ,,, | Performed by: INTERNAL MEDICINE

## 2023-02-27 PROCEDURE — 84100 ASSAY OF PHOSPHORUS: CPT | Performed by: STUDENT IN AN ORGANIZED HEALTH CARE EDUCATION/TRAINING PROGRAM

## 2023-02-27 PROCEDURE — 63600175 PHARM REV CODE 636 W HCPCS: Performed by: HOSPITALIST

## 2023-02-27 PROCEDURE — 99233 PR SUBSEQUENT HOSPITAL CARE,LEVL III: ICD-10-PCS | Mod: ,,, | Performed by: INTERNAL MEDICINE

## 2023-02-27 PROCEDURE — 25000003 PHARM REV CODE 250: Performed by: HOSPITALIST

## 2023-02-27 PROCEDURE — 63600175 PHARM REV CODE 636 W HCPCS: Performed by: STUDENT IN AN ORGANIZED HEALTH CARE EDUCATION/TRAINING PROGRAM

## 2023-02-27 PROCEDURE — 93010 EKG 12-LEAD: ICD-10-PCS | Mod: ,,, | Performed by: INTERNAL MEDICINE

## 2023-02-27 PROCEDURE — 85025 COMPLETE CBC W/AUTO DIFF WBC: CPT | Performed by: STUDENT IN AN ORGANIZED HEALTH CARE EDUCATION/TRAINING PROGRAM

## 2023-02-27 PROCEDURE — 83735 ASSAY OF MAGNESIUM: CPT | Performed by: STUDENT IN AN ORGANIZED HEALTH CARE EDUCATION/TRAINING PROGRAM

## 2023-02-27 PROCEDURE — 93005 ELECTROCARDIOGRAM TRACING: CPT

## 2023-02-27 PROCEDURE — 11000001 HC ACUTE MED/SURG PRIVATE ROOM

## 2023-02-27 PROCEDURE — 36415 COLL VENOUS BLD VENIPUNCTURE: CPT | Performed by: STUDENT IN AN ORGANIZED HEALTH CARE EDUCATION/TRAINING PROGRAM

## 2023-02-27 PROCEDURE — 99232 PR SUBSEQUENT HOSPITAL CARE,LEVL II: ICD-10-PCS | Mod: ,,, | Performed by: PODIATRIST

## 2023-02-27 PROCEDURE — 99900035 HC TECH TIME PER 15 MIN (STAT)

## 2023-02-27 PROCEDURE — 99232 SBSQ HOSP IP/OBS MODERATE 35: CPT | Mod: ,,, | Performed by: PODIATRIST

## 2023-02-27 PROCEDURE — 93010 ELECTROCARDIOGRAM REPORT: CPT | Mod: ,,, | Performed by: INTERNAL MEDICINE

## 2023-02-27 RX ORDER — CEFEPIME HYDROCHLORIDE 1 G/50ML
2 INJECTION, SOLUTION INTRAVENOUS
Status: DISCONTINUED | OUTPATIENT
Start: 2023-02-28 | End: 2023-02-28

## 2023-02-27 RX ORDER — MAGNESIUM SULFATE HEPTAHYDRATE 40 MG/ML
2 INJECTION, SOLUTION INTRAVENOUS ONCE
Status: COMPLETED | OUTPATIENT
Start: 2023-02-27 | End: 2023-02-27

## 2023-02-27 RX ORDER — CEFEPIME HYDROCHLORIDE 1 G/50ML
2 INJECTION, SOLUTION INTRAVENOUS
Status: DISCONTINUED | OUTPATIENT
Start: 2023-02-27 | End: 2023-02-27

## 2023-02-27 RX ADMIN — METOPROLOL TARTRATE 25 MG: 25 TABLET, FILM COATED ORAL at 08:02

## 2023-02-27 RX ADMIN — HYDROMORPHONE HYDROCHLORIDE 1 MG: 1 INJECTION, SOLUTION INTRAMUSCULAR; INTRAVENOUS; SUBCUTANEOUS at 08:02

## 2023-02-27 RX ADMIN — ENOXAPARIN SODIUM 40 MG: 40 INJECTION SUBCUTANEOUS at 05:02

## 2023-02-27 RX ADMIN — POLYETHYLENE GLYCOL 3350 17 G: 17 POWDER, FOR SOLUTION ORAL at 07:02

## 2023-02-27 RX ADMIN — HYDROMORPHONE HYDROCHLORIDE 1 MG: 1 INJECTION, SOLUTION INTRAMUSCULAR; INTRAVENOUS; SUBCUTANEOUS at 01:02

## 2023-02-27 RX ADMIN — HYDROMORPHONE HYDROCHLORIDE 1 MG: 1 INJECTION, SOLUTION INTRAMUSCULAR; INTRAVENOUS; SUBCUTANEOUS at 02:02

## 2023-02-27 RX ADMIN — RISPERIDONE 3 MG: 1 SOLUTION ORAL at 08:02

## 2023-02-27 RX ADMIN — INSULIN ASPART 6 UNITS: 100 INJECTION, SOLUTION INTRAVENOUS; SUBCUTANEOUS at 05:02

## 2023-02-27 RX ADMIN — MICONAZOLE NITRATE 1 APPLICATOR: 20 CREAM VAGINAL at 08:02

## 2023-02-27 RX ADMIN — INSULIN ASPART 4 UNITS: 100 INJECTION, SOLUTION INTRAVENOUS; SUBCUTANEOUS at 11:02

## 2023-02-27 RX ADMIN — INSULIN ASPART 3 UNITS: 100 INJECTION, SOLUTION INTRAVENOUS; SUBCUTANEOUS at 08:02

## 2023-02-27 RX ADMIN — LISINOPRIL 10 MG: 5 TABLET ORAL at 07:02

## 2023-02-27 RX ADMIN — METOPROLOL TARTRATE 25 MG: 25 TABLET, FILM COATED ORAL at 07:02

## 2023-02-27 RX ADMIN — MAGNESIUM SULFATE HEPTAHYDRATE 2 G: 40 INJECTION, SOLUTION INTRAVENOUS at 09:02

## 2023-02-27 RX ADMIN — PRAVASTATIN SODIUM 40 MG: 40 TABLET ORAL at 08:02

## 2023-02-27 RX ADMIN — DIVALPROEX SODIUM 500 MG: 250 TABLET, DELAYED RELEASE ORAL at 07:02

## 2023-02-27 RX ADMIN — HYDROMORPHONE HYDROCHLORIDE 1 MG: 1 INJECTION, SOLUTION INTRAMUSCULAR; INTRAVENOUS; SUBCUTANEOUS at 07:02

## 2023-02-27 RX ADMIN — INSULIN DETEMIR 15 UNITS: 100 INJECTION, SOLUTION SUBCUTANEOUS at 08:02

## 2023-02-27 NOTE — SUBJECTIVE & OBJECTIVE
Interval History:   NAEON. Denies SOB or CP.   Eating okay. UOP wnl.   No new complaints        Review of Systems   Respiratory:  Negative for shortness of breath.    Cardiovascular:  Negative for chest pain.   Musculoskeletal:  Positive for arthralgias and gait problem.   Skin:  Positive for color change and wound.       Objective:     Vital Signs (Most Recent):  Temp: 98.2 °F (36.8 °C) (02/27/23 0721)  Pulse: 76 (02/27/23 0721)  Resp: 18 (02/27/23 0755)  BP: (!) 117/55 (02/27/23 0721)  SpO2: (!) 94 % (02/27/23 0721)   Vital Signs (24h Range):  Temp:  [98 °F (36.7 °C)-98.8 °F (37.1 °C)] 98.2 °F (36.8 °C)  Pulse:  [64-77] 76  Resp:  [16-18] 18  SpO2:  [93 %-97 %] 94 %  BP: (117-157)/(55-70) 117/55     Weight: 104 kg (229 lb 4.5 oz)  Body mass index is 37.01 kg/m².    Intake/Output Summary (Last 24 hours) at 2/27/2023 0849  Last data filed at 2/27/2023 0508  Gross per 24 hour   Intake 1196.33 ml   Output --   Net 1196.33 ml        Physical Exam  Vitals reviewed.   Constitutional:       General: She is not in acute distress.     Appearance: Normal appearance. She is obese. She is not ill-appearing.   HENT:      Head: Normocephalic and atraumatic.      Right Ear: External ear normal.      Left Ear: External ear normal.      Nose: Nose normal.      Mouth/Throat:      Mouth: Mucous membranes are moist.      Pharynx: Oropharynx is clear.   Eyes:      General:         Right eye: No discharge.         Left eye: No discharge.      Extraocular Movements: Extraocular movements intact.      Pupils: Pupils are equal, round, and reactive to light.   Cardiovascular:      Rate and Rhythm: Normal rate.      Pulses: Normal pulses.      Heart sounds: Normal heart sounds. No murmur heard.    No friction rub. No gallop.   Pulmonary:      Effort: Pulmonary effort is normal. No respiratory distress.      Breath sounds: Normal breath sounds. No wheezing.   Abdominal:      General: Bowel sounds are normal. There is no distension.       Palpations: Abdomen is soft.      Tenderness: There is no abdominal tenderness. There is no guarding.   Musculoskeletal:         General: No swelling. Normal range of motion.      Cervical back: Normal range of motion.   Feet:      Left foot:      Skin integrity: Ulcer and skin breakdown present.      Comments: Left foot and complains of 9/10 pin with drainage noted on plantar aspect of dressing.  Skin:     General: Skin is warm.      Capillary Refill: Capillary refill takes less than 2 seconds.   Neurological:      General: No focal deficit present.      Mental Status: She is alert and oriented to person, place, and time.      Motor: No weakness.           Recent Results (from the past 24 hour(s))   POCT glucose    Collection Time: 02/26/23 11:42 AM   Result Value Ref Range    POCT Glucose 302 (H) 70 - 110 mg/dL   POCT glucose    Collection Time: 02/26/23  4:12 PM   Result Value Ref Range    POCT Glucose 245 (H) 70 - 110 mg/dL   POCT glucose    Collection Time: 02/26/23  8:03 PM   Result Value Ref Range    POCT Glucose 216 (H) 70 - 110 mg/dL   Phosphorus    Collection Time: 02/27/23  4:53 AM   Result Value Ref Range    Phosphorus 4.2 2.7 - 4.5 mg/dL   Magnesium    Collection Time: 02/27/23  4:53 AM   Result Value Ref Range    Magnesium 1.5 (L) 1.6 - 2.6 mg/dL   Comprehensive Metabolic Panel    Collection Time: 02/27/23  4:53 AM   Result Value Ref Range    Sodium 140 136 - 145 mmol/L    Potassium 4.7 3.5 - 5.1 mmol/L    Chloride 102 95 - 110 mmol/L    CO2 24 23 - 29 mmol/L    Glucose 168 (H) 70 - 110 mg/dL    BUN 8 6 - 20 mg/dL    Creatinine 0.6 0.5 - 1.4 mg/dL    Calcium 9.2 8.7 - 10.5 mg/dL    Total Protein 6.9 6.0 - 8.4 g/dL    Albumin 3.1 (L) 3.5 - 5.2 g/dL    Total Bilirubin 0.3 0.1 - 1.0 mg/dL    Alkaline Phosphatase 75 55 - 135 U/L    AST 29 10 - 40 U/L    ALT 10 10 - 44 U/L    Anion Gap 14 8 - 16 mmol/L    eGFR >60 >60 mL/min/1.73 m^2   CBC Auto Differential    Collection Time: 02/27/23  4:53 AM   Result  Value Ref Range    WBC 12.37 3.90 - 12.70 K/uL    RBC 4.55 4.00 - 5.40 M/uL    Hemoglobin 10.6 (L) 12.0 - 16.0 g/dL    Hematocrit 35.2 (L) 37.0 - 48.5 %    MCV 77 (L) 82 - 98 fL    MCH 23.3 (L) 27.0 - 31.0 pg    MCHC 30.1 (L) 32.0 - 36.0 g/dL    RDW 18.0 (H) 11.5 - 14.5 %    Platelets 489 (H) 150 - 450 K/uL    MPV 10.5 9.2 - 12.9 fL    Immature Granulocytes 0.3 0.0 - 0.5 %    Gran # (ANC) 4.7 1.8 - 7.7 K/uL    Immature Grans (Abs) 0.04 0.00 - 0.04 K/uL    Lymph # 5.4 (H) 1.0 - 4.8 K/uL    Mono # 1.4 (H) 0.3 - 1.0 K/uL    Eos # 0.6 (H) 0.0 - 0.5 K/uL    Baso # 0.11 0.00 - 0.20 K/uL    nRBC 0 0 /100 WBC    Gran % 38.2 38.0 - 73.0 %    Lymph % 43.8 18.0 - 48.0 %    Mono % 11.6 4.0 - 15.0 %    Eosinophil % 5.2 0.0 - 8.0 %    Basophil % 0.9 0.0 - 1.9 %    Platelet Estimate Increased (A)     Differential Method Automated    POCT glucose    Collection Time: 02/27/23  7:21 AM   Result Value Ref Range    POCT Glucose 146 (H) 70 - 110 mg/dL       Microbiology Results (last 7 days)       Procedure Component Value Units Date/Time    Blood Culture #1 **CANNOT BE ORDERED STAT** [[id number]] Collected: 02/23/23 1614    Order Status: Completed Specimen: Blood from Peripheral, Antecubital, Left Updated: 02/26/23 1703     Blood Culture, Routine No Growth to date      No Growth to date      No Growth to date      No Growth to date    Blood Culture #2 **CANNOT BE ORDERED STAT** [[id number]] Collected: 02/23/23 1558    Order Status: Completed Specimen: Blood from Peripheral, Antecubital, Right Updated: 02/26/23 1703     Blood Culture, Routine No Growth to date      No Growth to date      No Growth to date      No Growth to date    Culture, Anaerobe [[id number]] Collected: 02/24/23 0936    Order Status: Completed Specimen: Wound from Foot, Left Updated: 02/26/23 0709     Anaerobic Culture Culture in progress    Aerobic culture [[id number]]  (Abnormal)  (Susceptibility) Collected: 02/24/23 0936    Order Status: Completed Specimen: Wound from  Foot, Left Updated: 02/26/23 0603     Aerobic Bacterial Culture PSEUDOMONAS AERUGINOSA  Rare      Gram stain [[id number]] Collected: 02/24/23 0936    Order Status: Completed Specimen: Wound from Foot, Left Updated: 02/24/23 1226     Gram Stain Result No WBC's      No organisms seen             Imaging Results              X-Ray Chest 1 View (Final result)  Result time 02/23/23 18:08:09      Final result by Shalom Bro MD (02/23/23 18:08:09)                   Impression:      No acute cardiopulmonary process identified.      Electronically signed by: Shalom Bro MD  Date:    02/23/2023  Time:    18:08               Narrative:    EXAMINATION:  XR CHEST 1 VIEW    CLINICAL HISTORY:  Systemic inflammatory response syndrome (sirs) of non-infectious origin without acute organ dysfunction    TECHNIQUE:  Single frontal view of the chest was performed.    COMPARISON:  01/11/2023.    FINDINGS:  Cardiac silhouette is normal in size.  Lungs are symmetrically expanded.  No evidence of focal consolidative process, pneumothorax, or significant pleural effusion.  No acute osseous abnormality identified.                                       US Lower Extremity Veins Left (Final result)  Result time 02/23/23 17:03:35      Final result by Shalom Bro MD (02/23/23 17:03:35)                   Impression:      No evidence of left lower extremity deep venous thrombosis.      Electronically signed by: Shalom Bro MD  Date:    02/23/2023  Time:    17:03               Narrative:    EXAMINATION:  US LOWER EXTREMITY VEINS LEFT    CLINICAL HISTORY:  Other specified soft tissue disorders    TECHNIQUE:  Duplex and color flow Doppler evaluation of the left lower extremity veins was performed.    COMPARISON:  01/09/2023.    FINDINGS:  No evidence of clot involving the bilateral common femoral veins or left greater saphenous, femoral, popliteal, peroneal, anterior and posterior tibial veins.  All venous structures demonstrate normal  respiratory phasicity and augment adequately.  No evidence of soft tissue mass or Baker's cyst.                                       X-Ray Foot Complete Left (Final result)  Result time 02/23/23 16:34:20      Final result by Tu Santos Jr., MD (02/23/23 16:34:20)                   Impression:      No radiographic evidence of osteomyelitis      Electronically signed by: Tu Restrepo Jr  Date:    02/23/2023  Time:    16:34               Narrative:    EXAMINATION:  XR FOOT COMPLETE 3 VIEW LEFT    CLINICAL HISTORY:  Type 2 diabetes mellitus with foot ulcer    TECHNIQUE:  XR FOOT COMPLETE 3 VIEW LEFT    COMPARISON:  01/09/2023    FINDINGS:  Large area of soft tissue ulceration is seen involving the plantar aspect of the midfoot with Charcot neuropathic changes seen throughout the midfoot.  No gross bone erosion, destruction, or aggressive periosteal reaction.  Nonspecific soft tissue swelling of the foot.

## 2023-02-27 NOTE — ASSESSMENT & PLAN NOTE
L foot MRI concerning for OM at the inferior cuboid in charcot foot with large plantar ulceration x several years  S/P Bedside debridement 2/24, wound cultures update Pseudomonas      Presented treatment options to patient once more including aggressive offloading and wound care with bone biopsy directed extended course of antibiotics which has failure several times or proximal amputation (BKA). Pt requests continued abx and wound care.   Bedside bone biopsy of cuboid, possible light debridement and wound vac application planned for tomorrow. Cefepime held in anticipation.  Abx per ID, appreciate recommendations   Nursing wound care routine in   Strict NWB Left foot   Podiatry will follow
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
## 2023-02-27 NOTE — SUBJECTIVE & OBJECTIVE
Subjective:     Interval History: NAEON. Afebrile. Patient notes worsening in pain going into her groin in contrast with improvement noted yesterday. Dressing c/d/I.       Scheduled Meds:   divalproex  500 mg Oral Daily    enoxaparin  40 mg Subcutaneous Daily    insulin detemir U-100  15 Units Subcutaneous QHS    lisinopriL  10 mg Oral Daily    metoprolol tartrate  25 mg Oral BID    miconazole  1 applicator Vaginal QHS    polyethylene glycol  17 g Oral Daily    pravastatin  40 mg Oral QHS    risperidone 1 mg/ml  3 mg Oral QHS     Continuous Infusions:  PRN Meds:acetaminophen, aluminum-magnesium hydroxide-simethicone, cadexomer iodine, dextrose 10%, dextrose 10%, dextrose, dextrose, glucagon (human recombinant), HYDROmorphone, insulin aspart U-100, melatonin, naloxone, ondansetron, prochlorperazine, simethicone, sodium chloride 0.9%    Review of Systems   Constitutional:  Negative for activity change, appetite change, chills, diaphoresis and fever.   HENT:  Negative for congestion and hearing loss.    Eyes:  Negative for visual disturbance.   Respiratory:  Negative for chest tightness, shortness of breath and wheezing.    Cardiovascular:  Negative for chest pain and leg swelling.   Gastrointestinal:  Negative for abdominal distention, diarrhea, nausea and vomiting.   Genitourinary:  Negative for decreased urine volume, difficulty urinating, dysuria, frequency, hematuria and urgency.   Musculoskeletal:  Positive for arthralgias and gait problem. Negative for back pain.   Skin:  Positive for wound.   Neurological:  Negative for dizziness and weakness.   Objective:     Vital Signs (Most Recent):  Temp: 98.8 °F (37.1 °C) (02/26/23 1940)  Pulse: 75 (02/26/23 1940)  Resp: 18 (02/26/23 1940)  BP: 139/66 (02/26/23 1940)  SpO2: (!) 93 % (02/26/23 1940)   Vital Signs (24h Range):  Temp:  [98 °F (36.7 °C)-98.8 °F (37.1 °C)] 98.8 °F (37.1 °C)  Pulse:  [] 75  Resp:  [17-18] 18  SpO2:  [93 %-98 %] 93 %  BP:  (104-157)/(56-70) 139/66     Weight: 104 kg (229 lb 4.5 oz)  Body mass index is 37.01 kg/m².    Foot Exam    Laboratory:  A1C:   Recent Labs   Lab 09/08/22  0546 09/20/22  0957 12/22/22  2322   HGBA1C 9.1* 8.6* 7.1*     CBC:   Recent Labs   Lab 02/26/23  0433   WBC 12.09   RBC 4.36   HGB 9.9*   HCT 33.6*   *   MCV 77*   MCH 22.7*   MCHC 29.5*     CMP:   Recent Labs   Lab 02/26/23  0433   *   CALCIUM 9.0   ALBUMIN 3.1*   PROT 6.3      K 4.7   CO2 26      BUN 12   CREATININE 0.7   ALKPHOS 72   ALT 13   AST 12   BILITOT 0.3     CRP:   Recent Labs   Lab 02/23/23  1558   CRP 10.1*     ESR:   Recent Labs   Lab 02/23/23  1558   SEDRATE 27*     Microbiology Results (last 7 days)       Procedure Component Value Units Date/Time    Blood Culture #1 **CANNOT BE ORDERED STAT** [[id number]] Collected: 02/23/23 1614    Order Status: Completed Specimen: Blood from Peripheral, Antecubital, Left Updated: 02/26/23 1703     Blood Culture, Routine No Growth to date      No Growth to date      No Growth to date      No Growth to date    Blood Culture #2 **CANNOT BE ORDERED STAT** [[id number]] Collected: 02/23/23 1558    Order Status: Completed Specimen: Blood from Peripheral, Antecubital, Right Updated: 02/26/23 1703     Blood Culture, Routine No Growth to date      No Growth to date      No Growth to date      No Growth to date    Culture, Anaerobe [[id number]] Collected: 02/24/23 0936    Order Status: Completed Specimen: Wound from Foot, Left Updated: 02/26/23 0709     Anaerobic Culture Culture in progress    Aerobic culture [[id number]]  (Abnormal)  (Susceptibility) Collected: 02/24/23 0936    Order Status: Completed Specimen: Wound from Foot, Left Updated: 02/26/23 0603     Aerobic Bacterial Culture PSEUDOMONAS AERUGINOSA  Rare      Gram stain [[id number]] Collected: 02/24/23 0936    Order Status: Completed Specimen: Wound from Foot, Left Updated: 02/24/23 1226     Gram Stain Result No WBC's      No organisms  seen            Diagnostic Results:  I have reviewed all pertinent imaging results/findings within the past 24 hours.

## 2023-02-27 NOTE — PLAN OF CARE
Problem: Adult Inpatient Plan of Care  Goal: Plan of Care Review  Outcome: Ongoing, Progressing  Goal: Patient-Specific Goal (Individualized)  Outcome: Ongoing, Progressing     Problem: Fall Injury Risk  Goal: Absence of Fall and Fall-Related Injury  Outcome: Ongoing, Progressing     Problem: Diabetes Comorbidity  Goal: Blood Glucose Level Within Targeted Range  Outcome: Ongoing, Progressing     Pt free from falls or any further trauma through out the shift.  Prescribed medication administered.Complaints of pain, prn medication administered. Blood glucose monitored. Pt in no distress. Will continue to monitor.
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
## 2023-02-27 NOTE — PROGRESS NOTES
[facility]  Podiatry  Progress Note    Patient Name: Audrey Natarajan  MRN: [id number]  Admission Date: 2/23/2023  Hospital Length of Stay: 1 days  Attending Physician: Jarocho Weldon MD  Primary Care Provider: Donaldo Pena MD     Subjective:     Interval History: NAEON. Afebrile. Patient notes worsening in pain going into her groin in contrast with improvement noted yesterday. Dressing c/d/I.       Scheduled Meds:   divalproex  500 mg Oral Daily    enoxaparin  40 mg Subcutaneous Daily    insulin detemir U-100  15 Units Subcutaneous QHS    lisinopriL  10 mg Oral Daily    metoprolol tartrate  25 mg Oral BID    miconazole  1 applicator Vaginal QHS    polyethylene glycol  17 g Oral Daily    pravastatin  40 mg Oral QHS    risperidone 1 mg/ml  3 mg Oral QHS     Continuous Infusions:  PRN Meds:acetaminophen, aluminum-magnesium hydroxide-simethicone, cadexomer iodine, dextrose 10%, dextrose 10%, dextrose, dextrose, glucagon (human recombinant), HYDROmorphone, insulin aspart U-100, melatonin, naloxone, ondansetron, prochlorperazine, simethicone, sodium chloride 0.9%    Review of Systems   Constitutional:  Negative for activity change, appetite change, chills, diaphoresis and fever.   HENT:  Negative for congestion and hearing loss.    Eyes:  Negative for visual disturbance.   Respiratory:  Negative for chest tightness, shortness of breath and wheezing.    Cardiovascular:  Negative for chest pain and leg swelling.   Gastrointestinal:  Negative for abdominal distention, diarrhea, nausea and vomiting.   Genitourinary:  Negative for decreased urine volume, difficulty urinating, dysuria, frequency, hematuria and urgency.   Musculoskeletal:  Positive for arthralgias and gait problem. Negative for back pain.   Skin:  Positive for wound.   Neurological:  Negative for dizziness and weakness.   Objective:     Vital Signs (Most Recent):  Temp: 98.8 °F (37.1 °C) (02/26/23 1940)  Pulse: 75 (02/26/23 1940)  Resp: 18  (02/26/23 1940)  BP: 139/66 (02/26/23 1940)  SpO2: (!) 93 % (02/26/23 1940)   Vital Signs (24h Range):  Temp:  [98 °F (36.7 °C)-98.8 °F (37.1 °C)] 98.8 °F (37.1 °C)  Pulse:  [] 75  Resp:  [17-18] 18  SpO2:  [93 %-98 %] 93 %  BP: (104-157)/(56-70) 139/66     Weight: 104 kg (229 lb 4.5 oz)  Body mass index is 37.01 kg/m².    Foot Exam    Laboratory:  A1C:   Recent Labs   Lab 09/08/22  0546 09/20/22  0957 12/22/22  2322   HGBA1C 9.1* 8.6* 7.1*     CBC:   Recent Labs   Lab 02/26/23  0433   WBC 12.09   RBC 4.36   HGB 9.9*   HCT 33.6*   *   MCV 77*   MCH 22.7*   MCHC 29.5*     CMP:   Recent Labs   Lab 02/26/23  0433   *   CALCIUM 9.0   ALBUMIN 3.1*   PROT 6.3      K 4.7   CO2 26      BUN 12   CREATININE 0.7   ALKPHOS 72   ALT 13   AST 12   BILITOT 0.3     CRP:   Recent Labs   Lab 02/23/23  1558   CRP 10.1*     ESR:   Recent Labs   Lab 02/23/23  1558   SEDRATE 27*     Microbiology Results (last 7 days)       Procedure Component Value Units Date/Time    Blood Culture #1 **CANNOT BE ORDERED STAT** [[id number]] Collected: 02/23/23 1614    Order Status: Completed Specimen: Blood from Peripheral, Antecubital, Left Updated: 02/26/23 1703     Blood Culture, Routine No Growth to date      No Growth to date      No Growth to date      No Growth to date    Blood Culture #2 **CANNOT BE ORDERED STAT** [[id number]] Collected: 02/23/23 1558    Order Status: Completed Specimen: Blood from Peripheral, Antecubital, Right Updated: 02/26/23 1703     Blood Culture, Routine No Growth to date      No Growth to date      No Growth to date      No Growth to date    Culture, Anaerobe [[id number]] Collected: 02/24/23 0936    Order Status: Completed Specimen: Wound from Foot, Left Updated: 02/26/23 0709     Anaerobic Culture Culture in progress    Aerobic culture [[id number]]  (Abnormal)  (Susceptibility) Collected: 02/24/23 0936    Order Status: Completed Specimen: Wound from Foot, Left Updated: 02/26/23 0603     Aerobic  Bacterial Culture PSEUDOMONAS AERUGINOSA  Rare      Gram stain [[id number]] Collected: 02/24/23 0936    Order Status: Completed Specimen: Wound from Foot, Left Updated: 02/24/23 1226     Gram Stain Result No WBC's      No organisms seen            Diagnostic Results:  I have reviewed all pertinent imaging results/findings within the past 24 hours.        Assessment/Plan:     ID  * Osteomyelitis of left foot  L foot MRI concerning for OM at the inferior cuboid in charcot foot with large plantar ulceration x several years  S/P Bedside debridement 2/24, wound cultures update Pseudomonas      Presented treatment options to patient once more including aggressive offloading and wound care with bone biopsy directed extended course of antibiotics which has failure several times or proximal amputation (BKA). Pt requests continued abx and wound care.   Bedside bone biopsy of cuboid, possible light debridement and wound vac application planned for tomorrow. Cefepime held in anticipation.  Abx per ID, appreciate recommendations   Nursing wound care routine in   Strict NWB Left foot   Podiatry will follow        Ariel Szymanski DPM  Podiatry  [facility] - Med Surg

## 2023-02-27 NOTE — PROGRESS NOTES
"[facility] Medicine  Progress Note    Patient Name: Audrey Natarajan  MRN: [id number]  Patient Class: IP- Inpatient   Admission Date: 2/23/2023  Length of Stay: 2 days  Attending Physician: Jarocho Weldon MD  Primary Care Provider: Donaldo Pena MD        Subjective:     Principal Problem:Osteomyelitis of left foot        HPI:  52 y.o. female with hypertension, hyperlipidemia, COPD, bipolar 1 disorder, DM2 presents with a complaint of foot pain.  Appears to be a chronic intermittent problem for some time, associated with erythema and edema of the extremity.  Was on IV vancomycin for awhile.  Has home health wound care.  Sees Podiatry Dr. De Los Santos and Infectious Disease Dr. Jarocho panchal.  Denies fever, chills, cough, SOB, chest pain, palpitations, dizziness, syncope, nausea, vomiting, diarrhea, abdominal pain.  In the ED, she was found to have leukocytosis with elevated lactic acid.  Inflammatory markers are also elevated.  X-ray of the foot an ultrasound of the veins of the lower extremity were unremarkable for acute abnormality.  Blood cultures were obtained and she was started on IV vancomycin in the ED.  Placed in observation.      Overview/Hospital Course:  Audrey Natarajan was placed under observation for management of left plantar foot ulceration, and need for further evaluation by podiatry and infectious disease.    Podiatry evaluated the patient and conducted a bedside debridement.  Cultures have been sent off and are pending.  Options were discussed with the patient by Podiatry which included amputation versus IV antibiotics for 6 weeks.  MRI midfoot obtained which shows: "Similar appearance of extensive underlying Charcot changes at the midfoot and metatarsal bases with concern for osteomyelitis at the inferior cuboid."  Podiatry evaluated the patient and recommends bone biopsy as well as holding antibiotics. Midline placed due to poor venous access.     Patient has received 6 weeks of " antibiotics for MRSA at previous hospitals.  Current culture growing out Pseudomonas, unclear if colonizer or if active infection.  Patient appears stable will hold cefepime until after biopsy tomorrow 02/27/2023.  One dose of fluconazole, and miconazole inserts daily for 7 days per patient request.    Will start cefepime while inpt after biopsy today 2/27/23. Will get EKG to assess QTc for potential Ciprofloxacin use at discharge.       Interval History:   NAEON. Denies SOB or CP.   Eating okay. UOP wnl.   No new complaints        Review of Systems   Respiratory:  Negative for shortness of breath.    Cardiovascular:  Negative for chest pain.   Musculoskeletal:  Positive for arthralgias and gait problem.   Skin:  Positive for color change and wound.       Objective:     Vital Signs (Most Recent):  Temp: 98.2 °F (36.8 °C) (02/27/23 0721)  Pulse: 76 (02/27/23 0721)  Resp: 18 (02/27/23 0755)  BP: (!) 117/55 (02/27/23 0721)  SpO2: (!) 94 % (02/27/23 0721)   Vital Signs (24h Range):  Temp:  [98 °F (36.7 °C)-98.8 °F (37.1 °C)] 98.2 °F (36.8 °C)  Pulse:  [64-77] 76  Resp:  [16-18] 18  SpO2:  [93 %-97 %] 94 %  BP: (117-157)/(55-70) 117/55     Weight: 104 kg (229 lb 4.5 oz)  Body mass index is 37.01 kg/m².    Intake/Output Summary (Last 24 hours) at 2/27/2023 0849  Last data filed at 2/27/2023 0508  Gross per 24 hour   Intake 1196.33 ml   Output --   Net 1196.33 ml        Physical Exam  Vitals reviewed.   Constitutional:       General: She is not in acute distress.     Appearance: Normal appearance. She is obese. She is not ill-appearing.   HENT:      Head: Normocephalic and atraumatic.      Right Ear: External ear normal.      Left Ear: External ear normal.      Nose: Nose normal.      Mouth/Throat:      Mouth: Mucous membranes are moist.      Pharynx: Oropharynx is clear.   Eyes:      General:         Right eye: No discharge.         Left eye: No discharge.      Extraocular Movements: Extraocular movements intact.       Pupils: Pupils are equal, round, and reactive to light.   Cardiovascular:      Rate and Rhythm: Normal rate.      Pulses: Normal pulses.      Heart sounds: Normal heart sounds. No murmur heard.    No friction rub. No gallop.   Pulmonary:      Effort: Pulmonary effort is normal. No respiratory distress.      Breath sounds: Normal breath sounds. No wheezing.   Abdominal:      General: Bowel sounds are normal. There is no distension.      Palpations: Abdomen is soft.      Tenderness: There is no abdominal tenderness. There is no guarding.   Musculoskeletal:         General: No swelling. Normal range of motion.      Cervical back: Normal range of motion.   Feet:      Left foot:      Skin integrity: Ulcer and skin breakdown present.      Comments: Left foot and complains of 9/10 pin with drainage noted on plantar aspect of dressing.  Skin:     General: Skin is warm.      Capillary Refill: Capillary refill takes less than 2 seconds.   Neurological:      General: No focal deficit present.      Mental Status: She is alert and oriented to person, place, and time.      Motor: No weakness.           Recent Results (from the past 24 hour(s))   POCT glucose    Collection Time: 02/26/23 11:42 AM   Result Value Ref Range    POCT Glucose 302 (H) 70 - 110 mg/dL   POCT glucose    Collection Time: 02/26/23  4:12 PM   Result Value Ref Range    POCT Glucose 245 (H) 70 - 110 mg/dL   POCT glucose    Collection Time: 02/26/23  8:03 PM   Result Value Ref Range    POCT Glucose 216 (H) 70 - 110 mg/dL   Phosphorus    Collection Time: 02/27/23  4:53 AM   Result Value Ref Range    Phosphorus 4.2 2.7 - 4.5 mg/dL   Magnesium    Collection Time: 02/27/23  4:53 AM   Result Value Ref Range    Magnesium 1.5 (L) 1.6 - 2.6 mg/dL   Comprehensive Metabolic Panel    Collection Time: 02/27/23  4:53 AM   Result Value Ref Range    Sodium 140 136 - 145 mmol/L    Potassium 4.7 3.5 - 5.1 mmol/L    Chloride 102 95 - 110 mmol/L    CO2 24 23 - 29 mmol/L    Glucose  168 (H) 70 - 110 mg/dL    BUN 8 6 - 20 mg/dL    Creatinine 0.6 0.5 - 1.4 mg/dL    Calcium 9.2 8.7 - 10.5 mg/dL    Total Protein 6.9 6.0 - 8.4 g/dL    Albumin 3.1 (L) 3.5 - 5.2 g/dL    Total Bilirubin 0.3 0.1 - 1.0 mg/dL    Alkaline Phosphatase 75 55 - 135 U/L    AST 29 10 - 40 U/L    ALT 10 10 - 44 U/L    Anion Gap 14 8 - 16 mmol/L    eGFR >60 >60 mL/min/1.73 m^2   CBC Auto Differential    Collection Time: 02/27/23  4:53 AM   Result Value Ref Range    WBC 12.37 3.90 - 12.70 K/uL    RBC 4.55 4.00 - 5.40 M/uL    Hemoglobin 10.6 (L) 12.0 - 16.0 g/dL    Hematocrit 35.2 (L) 37.0 - 48.5 %    MCV 77 (L) 82 - 98 fL    MCH 23.3 (L) 27.0 - 31.0 pg    MCHC 30.1 (L) 32.0 - 36.0 g/dL    RDW 18.0 (H) 11.5 - 14.5 %    Platelets 489 (H) 150 - 450 K/uL    MPV 10.5 9.2 - 12.9 fL    Immature Granulocytes 0.3 0.0 - 0.5 %    Gran # (ANC) 4.7 1.8 - 7.7 K/uL    Immature Grans (Abs) 0.04 0.00 - 0.04 K/uL    Lymph # 5.4 (H) 1.0 - 4.8 K/uL    Mono # 1.4 (H) 0.3 - 1.0 K/uL    Eos # 0.6 (H) 0.0 - 0.5 K/uL    Baso # 0.11 0.00 - 0.20 K/uL    nRBC 0 0 /100 WBC    Gran % 38.2 38.0 - 73.0 %    Lymph % 43.8 18.0 - 48.0 %    Mono % 11.6 4.0 - 15.0 %    Eosinophil % 5.2 0.0 - 8.0 %    Basophil % 0.9 0.0 - 1.9 %    Platelet Estimate Increased (A)     Differential Method Automated    POCT glucose    Collection Time: 02/27/23  7:21 AM   Result Value Ref Range    POCT Glucose 146 (H) 70 - 110 mg/dL       Microbiology Results (last 7 days)       Procedure Component Value Units Date/Time    Blood Culture #1 **CANNOT BE ORDERED STAT** [[id number]] Collected: 02/23/23 1614    Order Status: Completed Specimen: Blood from Peripheral, Antecubital, Left Updated: 02/26/23 1703     Blood Culture, Routine No Growth to date      No Growth to date      No Growth to date      No Growth to date    Blood Culture #2 **CANNOT BE ORDERED STAT** [[id number]] Collected: 02/23/23 1558    Order Status: Completed Specimen: Blood from Peripheral, Antecubital, Right Updated:  02/26/23 1703     Blood Culture, Routine No Growth to date      No Growth to date      No Growth to date      No Growth to date    Culture, Anaerobe [[id number]] Collected: 02/24/23 0936    Order Status: Completed Specimen: Wound from Foot, Left Updated: 02/26/23 0709     Anaerobic Culture Culture in progress    Aerobic culture [[id number]]  (Abnormal)  (Susceptibility) Collected: 02/24/23 0936    Order Status: Completed Specimen: Wound from Foot, Left Updated: 02/26/23 0603     Aerobic Bacterial Culture PSEUDOMONAS AERUGINOSA  Rare      Gram stain [[id number]] Collected: 02/24/23 0936    Order Status: Completed Specimen: Wound from Foot, Left Updated: 02/24/23 1226     Gram Stain Result No WBC's      No organisms seen             Imaging Results              X-Ray Chest 1 View (Final result)  Result time 02/23/23 18:08:09      Final result by Shalom Bro MD (02/23/23 18:08:09)                   Impression:      No acute cardiopulmonary process identified.      Electronically signed by: Shalom Bro MD  Date:    02/23/2023  Time:    18:08               Narrative:    EXAMINATION:  XR CHEST 1 VIEW    CLINICAL HISTORY:  Systemic inflammatory response syndrome (sirs) of non-infectious origin without acute organ dysfunction    TECHNIQUE:  Single frontal view of the chest was performed.    COMPARISON:  01/11/2023.    FINDINGS:  Cardiac silhouette is normal in size.  Lungs are symmetrically expanded.  No evidence of focal consolidative process, pneumothorax, or significant pleural effusion.  No acute osseous abnormality identified.                                       US Lower Extremity Veins Left (Final result)  Result time 02/23/23 17:03:35      Final result by Shalom Bro MD (02/23/23 17:03:35)                   Impression:      No evidence of left lower extremity deep venous thrombosis.      Electronically signed by: Shalom Bro MD  Date:    02/23/2023  Time:    17:03               Narrative:     "EXAMINATION:  US LOWER EXTREMITY VEINS LEFT    CLINICAL HISTORY:  Other specified soft tissue disorders    TECHNIQUE:  Duplex and color flow Doppler evaluation of the left lower extremity veins was performed.    COMPARISON:  01/09/2023.    FINDINGS:  No evidence of clot involving the bilateral common femoral veins or left greater saphenous, femoral, popliteal, peroneal, anterior and posterior tibial veins.  All venous structures demonstrate normal respiratory phasicity and augment adequately.  No evidence of soft tissue mass or Baker's cyst.                                       X-Ray Foot Complete Left (Final result)  Result time 02/23/23 16:34:20      Final result by Tu Santos Jr., MD (02/23/23 16:34:20)                   Impression:      No radiographic evidence of osteomyelitis      Electronically signed by: Tu Restrepo Jr  Date:    02/23/2023  Time:    16:34               Narrative:    EXAMINATION:  XR FOOT COMPLETE 3 VIEW LEFT    CLINICAL HISTORY:  Type 2 diabetes mellitus with foot ulcer    TECHNIQUE:  XR FOOT COMPLETE 3 VIEW LEFT    COMPARISON:  01/09/2023    FINDINGS:  Large area of soft tissue ulceration is seen involving the plantar aspect of the midfoot with Charcot neuropathic changes seen throughout the midfoot.  No gross bone erosion, destruction, or aggressive periosteal reaction.  Nonspecific soft tissue swelling of the foot.                                            Assessment/Plan:      * Osteomyelitis of left foot  Erythema, edema, with worsening pain.  Leukocytosis and elevated lactic acid elevated inflammatory markers on lab work.  Blood cultures are pending.  Continue IV antibiotics.  Podiatry and ID consulted.    02/24: Leukocytosis resolved; s/p bedside debridement by Podiatry; MRI foot shows "Similar appearance of extensive underlying Charcot changes at the midfoot and metatarsal bases with concern for osteomyelitis at the inferior cuboid." Cultures obtained and pending. ID " recommending bone biopsy and holding abx to increase yield of culture.     02/25: Continues to endorse foot pain.  Plantar aspect of dressing noted to be what with drainage.  Pseudomonas growing.    Patient has received 6 weeks of antibiotics for MRSA at previous hospitals.   Current culture growing out Pseudomonas, unclear if colonizer or if active infection.    Patient appears stable will hold cefepime until after biopsy tomorrow 02/27/2023.      SHAWN (obstructive sleep apnea)  CPAP QHS    Type 2 diabetes mellitus  Patient's FSGs are controlled on current medication regimen.  Last A1c reviewed-   Lab Results   Component Value Date    HGBA1C 7.1 (H) 12/22/2022     Most recent fingerstick glucose reviewed-   Recent Labs   Lab 02/24/23  1700 02/24/23  1946 02/25/23  0743 02/25/23  1159   POCTGLUCOSE 180* 217* 157* 194*     Current correctional scale  Medium  Maintain anti-hyperglycemic dose as follows-   Antihyperglycemics (From admission, onward)      Start     Stop Route Frequency Ordered    02/23/23 2330  insulin detemir U-100 pen 15 Units         -- SubQ Nightly 02/23/23 2229    02/23/23 2328  insulin aspart U-100 pen 1-10 Units         -- SubQ Before meals & nightly PRN 02/23/23 2229        Diabetic Diet  Hold Oral hypoglycemics while patient is in the hospital.    Bipolar 1 disorder  Continue home regimen of risperidone    Tobacco abuse  5 minutes spent counseling the patient on smoking cessation and she is not currently ready to stop smoking. She will be monitored for withdrawal.  She will be provided with additional smoking cessation counseling prior to discharge.    Hyperlipidemia  Continue statin    COPD (chronic obstructive pulmonary disease)  P.r.n. nebs    Essential hypertension  Well controlled, continue home medications and monitor blood pressure, adjust as needed.       VTE Risk Mitigation (From admission, onward)           Ordered     enoxaparin injection 40 mg  Daily         02/23/23 2229     IP VTE  HIGH RISK PATIENT  Once         02/23/23 2229     Place sequential compression device  Until discontinued         02/23/23 2229                    Discharge Planning   HUMBERTO:      Code Status: Full Code   Is the patient medically ready for discharge?:     Reason for patient still in hospital (select all that apply): Patient trending condition and Treatment  Discharge Plan A: Home                  Jarocho Weldon MD  Department of Hospital Medicine   [facility]

## 2023-02-27 NOTE — SUBJECTIVE & OBJECTIVE
"Interval history: NAEO. Awaiting bone biopsy and wound vac placement. On vanc. Cefepime held until after biopsy. Leg swelling resolved. Stopped smoking since last Wednesday. Pt reports spending >8 months last year in the hospital        Past Surgical History:   Procedure Laterality Date    ABDOMINAL SURGERY  2010    gastric sleeve    BILATERAL OOPHORECTOMY Bilateral 1/12/2015    CHOLECYSTECTOMY      DEBRIDEMENT OF FOOT Bilateral 5/10/2022    Procedure: DEBRIDEMENT, FOOT;  Surgeon: Maira De Los Santos DPM;  Location: [facility] OR;  Service: Podiatry;  Laterality: Bilateral;    Green' s filter Right 7/4/2012    Right Neck & Tunneled Down.    HERNIA REPAIR      "[place] of Hernias Repaires around th [place]ly [place].", pt. states    INCISION AND DRAINAGE FOOT Left 12/24/2022    Procedure: INCISION AND DRAINAGE, FOOT;  Surgeon: Fahad Razo DPM;  Location: [facility] OR;  Service: Podiatry;  Laterality: Left;    LAPAROSCOPIC CHOLECYSTECTOMY N/A 9/10/2020    Procedure: CHOLECYSTECTOMY, LAPAROSCOPIC;  Surgeon: Mnotrell Gutierrez MD;  Location: [facility] OR;  Service: General;  Laterality: N/A;  RN PREOP 9/9----COVID Negative  9/9    OVARIAN CYST REMOVAL  3/13/2014    DE REMOVAL OF OVARY/TUBE(S)      SPLENECTOMY, TOTAL  July 2003    TONSILLECTOMY      as a child    TYMPANOSTOMY TUBE PLACEMENT  1976    VEIN SURGERY  2003    Lt leg       Review of patient's allergies indicates:   Allergen Reactions    Morphine Other (See Comments)     Patient had a psychotic episode after taking Morphine  Agitation, hallucinations    Penicillins Anaphylaxis     Tolerated cephalosporins in the past    Januvia [sitagliptin] Hives    Carbamazepine Other (See Comments)     hyponatremia       No current facility-administered medications on file prior to encounter.     Current Outpatient Medications on File Prior to Encounter   Medication Sig    acetaminophen (TYLENOL) 500 MG tablet Take 2 tablets (1,000 mg total) by mouth every 6 (six) hours as needed for Pain.    albuterol " (PROVENTIL/VENTOLIN HFA) 90 mcg/actuation inhaler INHALE 2 PUFFS INTO THE LUNGS EVERY 6 HOURS AS NEEDED FOR WHEEZING. RESCUE    apixaban (ELIQUIS) 5 mg Tab Take 1 tablet (5 mg total) by mouth 2 (two) times daily.    aspirin 81 MG Chew Take 1 tablet (81 mg total) by mouth once daily.    bumetanide (BUMEX) 1 MG tablet Take 1 tablet (1 mg total) by mouth once daily.    divalproex (DEPAKOTE) 500 MG TbEC Take 1 tablet (500 mg total) by mouth once daily. PO QAM    ferrous sulfate 325 (65 FE) MG EC tablet Take 1 tablet (325 mg total) by mouth once daily.    hydrOXYzine (ATARAX) 50 MG tablet Take 0.5 tablets (25 mg total) by mouth 4 (four) times daily as needed for Itching or Anxiety.    lisinopriL 10 MG tablet Take 1 tablet (10 mg total) by mouth once daily.    loratadine (CLARITIN) 10 mg tablet Take 1 tablet (10 mg total) by mouth once daily.    metFORMIN (GLUCOPHAGE) 1000 MG tablet Take 1 tablet (1,000 mg total) by mouth 2 (two) times daily with meals.    metoprolol tartrate (LOPRESSOR) 25 MG tablet Take 1 tablet (25 mg total) by mouth 2 (two) times daily.    multivitamin Tab Take 1 tablet by mouth once daily.    pantoprazole (PROTONIX) 40 MG tablet Take 1 tablet (40 mg total) by mouth once daily.    pravastatin (PRAVACHOL) 40 MG tablet Take 1 tablet (40 mg total) by mouth every evening.    risperiDONE (RISPERDAL M-TABS) 3 MG disintegrating tablet Take 1 tablet (3 mg total) by mouth 2 (two) times daily. (Patient taking differently: Take 3 mg by mouth nightly.)    VYVANSE 40 mg Cap Take 40 mg by mouth once daily.    albuterol-ipratropium (DUO-NEB) 2.5 mg-0.5 mg/3 mL nebulizer solution Take 3 mLs by nebulization every 6 (six) hours as needed for Wheezing or Shortness of Breath. Rescue    ammonium lactate (LAC-HYDRIN) 12 % lotion APPL Y ONCE TOPICALLY TWICE DAILY FOR 30 DAYS    DUPIXENT  mg/2 mL PnIj Inject into the skin.    fluticasone propionate (FLONASE) 50 mcg/actuation nasal spray 2 sprays (100 mcg total) by  Each Nostril route daily as needed (Nasal congestion).    fluticasone-salmeterol diskus inhaler 250-50 mcg Inhale 1 puff into the lungs 2 (two) times daily. Controller    insulin detemir U-100 (LEVEMIR FLEXTOUCH) 100 unit/mL (3 mL) SubQ InPn pen Inject 22 Units into the skin every evening.    LIDOcaine (LIDODERM) 5 % Place 1 patch onto the skin once daily. Remove & Discard patch within 12 hours or as directed by MD    magnesium hydroxide 400 mg/5 ml (MILK OF MAGNESIA) 400 mg/5 mL Susp Take 30 mLs (2,400 mg total) by mouth daily as needed.    methocarbamoL (ROBAXIN) 500 MG Tab Take 500 mg by mouth. Frequency could not be confirmed.    nystatin (NYSTOP) powder APPLY TO ABDOMINAL AND BREAST SKIN FOLD TWICE DAILY.    oxyCODONE-acetaminophen (PERCOCET)  mg per tablet Take 1 tablet by mouth every 6 (six) hours as needed for Pain.    polyethylene glycol (GLYCOLAX) 17 gram PwPk Take 17 g by mouth once daily.    senna-docusate 8.6-50 mg (PERICOLACE) 8.6-50 mg per tablet Take 1 tablet by mouth 2 (two) times daily.    [DISCONTINUED] diclofenac sodium (VOLTAREN) 1 % Gel Apply 2 g topically 4 (four) times daily as needed (Apply to painful area up to 4 times a day as needed for pain). Apply to painful area 4 times a day as needed for pain    [DISCONTINUED] furosemide (LASIX) 20 MG tablet TAKE 1 TABLET(20 MG) BY MOUTH EVERY DAY    [DISCONTINUED] QUEtiapine (SEROQUEL) 200 MG Tab Take 1 tablet (200 mg total) by mouth before breakfast.     Family History       Problem Relation (Age of Onset)    Cataracts Father    Diabetes Father, Paternal Grandfather    Heart disease Father, Paternal Grandfather    Hypertension Father    No Known Problems Mother, Sister, Brother, Maternal Aunt, Maternal Uncle, Paternal Aunt, Paternal Uncle, Maternal Grandfather    Ovarian cancer Maternal Grandmother, Paternal Grandmother          Tobacco Use    Smoking status: Every Day     Packs/day: 1.00     Years: 37.00     Pack years: 37.00     Types:  Cigarettes     Last attempt to quit: 2020     Years since quittin.2    Smokeless tobacco: Current    Tobacco comments:     Enrolled in the [employer] on 5/3/14 ([facility] # [id number]). Ambulatory referral to Smoking Cessation Program   Substance and Sexual Activity    Alcohol use: No     Alcohol/week: 0.0 standard drinks    Drug use: No    Sexual activity: Yes     Partners: Male     Review of Systems   Constitutional:  Negative for chills, fatigue and fever.   HENT:  Negative for congestion and rhinorrhea.    Eyes:  Negative for photophobia and visual disturbance.   Respiratory:  Negative for cough and shortness of breath.    Cardiovascular:  Positive for leg swelling. Negative for chest pain and palpitations.   Gastrointestinal:  Negative for abdominal pain, diarrhea, nausea and vomiting.   Genitourinary:  Negative for dysuria, frequency and urgency.   Musculoskeletal:  Positive for arthralgias and myalgias.   Skin:  Positive for color change and wound. Negative for pallor and rash.   Neurological:  Negative for light-headedness and headaches.   Psychiatric/Behavioral:  Negative for confusion and decreased concentration.    Objective:     Vital Signs (Most Recent):  Temp: 98.2 °F (36.8 °C) (23 07)  Pulse: 76 (23 07)  Resp: 18 (23 0755)  BP: (!) 117/55 (23)  SpO2: (!) 94 % (23)   Vital Signs (24h Range):  Temp:  [98 °F (36.7 °C)-98.8 °F (37.1 °C)] 98.2 °F (36.8 °C)  Pulse:  [64-77] 76  Resp:  [16-18] 18  SpO2:  [93 %-97 %] 94 %  BP: (117-157)/(55-70) 117/55     Weight: 104 kg (229 lb 4.5 oz)  Body mass index is 37.01 kg/m².    Physical Exam  Vitals and nursing note reviewed.   Constitutional:       General: She is not in acute distress.     Appearance: She is well-developed.   HENT:      Head: Normocephalic and atraumatic.      Right Ear: External ear normal.      Left Ear: External ear normal.      Nose: Nose normal.   Eyes:      Conjunctiva/sclera:  Conjunctivae normal.      Pupils: Pupils are equal, round, and reactive to light.   Cardiovascular:      Rate and Rhythm: Normal rate and regular rhythm.   Pulmonary:      Effort: Pulmonary effort is normal. No respiratory distress.      Breath sounds: Normal breath sounds. No wheezing.   Abdominal:      General: Bowel sounds are normal. There is no distension.      Palpations: Abdomen is soft.      Tenderness: There is no abdominal tenderness.      Comments: No palpable hepatomegaly or splenomegaly    Musculoskeletal:         General: No tenderness. Normal range of motion.      Cervical back: Normal range of motion and neck supple.      Comments: Dressing c/d/i   Skin:     General: Skin is warm and dry.   Neurological:      Mental Status: She is alert and oriented to person, place, and time.   Psychiatric:         Thought Content: Thought content normal.         CRANIAL NERVES     CN III, IV, VI   Pupils are equal, round, and reactive to light.     Significant Labs: All pertinent labs within the past 24 hours have been reviewed.    Significant Imaging: I have reviewed all pertinent imaging results/findings within the past 24 hours.

## 2023-02-27 NOTE — ANESTHESIA PREPROCEDURE EVALUATION
"                                                                                                             02/27/2023  Audrey Natarajan is a 52 y.o., female.  To undergo Procedure(s) (LRB):  DEBRIDEMENT, FOOT,biopsy (Left)     Denies CP/SOB/MI/CVA/URI symptoms.  Endorses occasional GERD.  METS > 4  NPO > 8 instructions given.    Past Medical History:  Past Medical History:   Diagnosis Date    ADHD (attention deficit hyperactivity disorder)     Arthritis     Asthma     Bipolar 1 disorder     Cataract     Cigarette smoker     COPD (chronic obstructive pulmonary disease)     Coronary artery disease     A fib    Depression     bipolar manic depresson    Diabetes mellitus     Diabetic foot ulcers     Diabetic neuropathy     DVT of lower extremity, bilateral 07/2013    bilateral LE DVT. Estelita filter placed.     Encounter for blood transfusion     History of blood clots 1. Left Leg=2003; 2.Bilateral Groin=Blood Clots= 5 or 6/ 2013 & 7/2013; 3. LLL of Lung=7/2013;  4. Lt. Lower Leg=7/2013.     Pt. had 1st Blood Clot after [id number], & Last=2013. Estelita Filter= Rt.Lateral Neck.    HTN (hypertension) 06/06/2013    Pt states that she does not have hypertension    Hypercholesteremia     Irregular heartbeat     Neuromuscular disorder     neuropathy feet    Obese     PE (pulmonary embolism) 07/2013    bilat LE DVT.     Restless leg syndrome        Past Surgical History:  Past Surgical History:   Procedure Laterality Date    ABDOMINAL SURGERY  2010    gastric sleeve    BILATERAL OOPHORECTOMY Bilateral 1/12/2015    CHOLECYSTECTOMY      DEBRIDEMENT OF FOOT Bilateral 5/10/2022    Procedure: DEBRIDEMENT, FOOT;  Surgeon: Maira De Los Santos DPM;  Location: [facility];  Service: Podiatry;  Laterality: Bilateral;    Green' s filter Right 7/4/2012    Right Neck & Tunneled Down.    HERNIA REPAIR      "[place] of Hernias Repaires around th [place].", pt. states    INCISION AND DRAINAGE FOOT " Left 2022    Procedure: INCISION AND DRAINAGE, FOOT;  Surgeon: Fahad Razo DPM;  Location: [facility] OR;  Service: Podiatry;  Laterality: Left;    LAPAROSCOPIC CHOLECYSTECTOMY N/A 9/10/2020    Procedure: CHOLECYSTECTOMY, LAPAROSCOPIC;  Surgeon: Montrell Gutierrez MD;  Location: [facility] OR;  Service: General;  Laterality: N/A;  RN PREOP ----COVID Negative      OVARIAN CYST REMOVAL  3/13/2014    TN REMOVAL OF OVARY/TUBE(S)      SPLENECTOMY, TOTAL  2003    TONSILLECTOMY      as a child    TYMPANOSTOMY TUBE PLACEMENT      VEIN SURGERY      Lt leg       Social History:  Social History     Socioeconomic History    Marital status: Significant Other   Tobacco Use    Smoking status: Every Day     Packs/day: 1.00     Years: 37.00     Pack years: 37.00     Types: Cigarettes     Last attempt to quit: 2020     Years since quittin.2    Smokeless tobacco: Current    Tobacco comments:     Enrolled in the [employer] on 5/3/14 ([facility] Member ID # [id number]). Ambulatory referral to Smoking Cessation Program   Substance and Sexual Activity    Alcohol use: No     Alcohol/week: 0.0 standard drinks    Drug use: No    Sexual activity: Yes     Partners: Male   Social History Narrative     from her  of 20 years    Lives by herself since 2019     Social Determinants of Health     Financial Resource Strain: Unknown    Difficulty of Paying Living Expenses: Patient refused   Food Insecurity: Unknown    Worried About Running Out of Food in the Last Year: Patient refused    Ran Out of Food in the Last Year: Patient refused   Transportation Needs: Unknown    Lack of Transportation (Medical): Patient refused    Lack of Transportation (Non-Medical): Patient refused   Physical Activity: Unknown    Days of Exercise per Week: Patient refused    Minutes of Exercise per Session: Patient refused   Stress: Unknown    Feeling of Stress : Patient refused   Social Connections: Unknown     Frequency of Communication with Friends and Family: Patient refused    Frequency of Social Gatherings with Friends and Family: Patient refused    Attends Rastafarian Services: Patient refused    Active Member of Clubs or Organizations: Patient refused    Attends Club or Organization Meetings: Patient refused    Marital Status: Patient refused   Housing Stability: Unknown    Unable to Pay for Housing in the Last Year: Patient refused    Unstable Housing in the Last Year: Patient refused       Medications:  No current facility-administered medications on file prior to encounter.     Current Outpatient Medications on File Prior to Encounter   Medication Sig Dispense Refill    acetaminophen (TYLENOL) 500 MG tablet Take 2 tablets (1,000 mg total) by mouth every 6 (six) hours as needed for Pain. 30 tablet 0    albuterol (PROVENTIL/VENTOLIN HFA) 90 mcg/actuation inhaler INHALE 2 PUFFS INTO THE LUNGS EVERY 6 HOURS AS NEEDED FOR WHEEZING. RESCUE 6.7 g 2    apixaban (ELIQUIS) 5 mg Tab Take 1 tablet (5 mg total) by mouth 2 (two) times daily. 60 tablet 0    aspirin 81 MG Chew Take 1 tablet (81 mg total) by mouth once daily. 30 tablet 0    bumetanide (BUMEX) 1 MG tablet Take 1 tablet (1 mg total) by mouth once daily. 30 tablet 5    divalproex (DEPAKOTE) 500 MG TbEC Take 1 tablet (500 mg total) by mouth once daily. PO QAM 30 tablet 11    ferrous sulfate 325 (65 FE) MG EC tablet Take 1 tablet (325 mg total) by mouth once daily. 30 tablet 0    hydrOXYzine (ATARAX) 50 MG tablet Take 0.5 tablets (25 mg total) by mouth 4 (four) times daily as needed for Itching or Anxiety.      lisinopriL 10 MG tablet Take 1 tablet (10 mg total) by mouth once daily. 30 tablet 0    loratadine (CLARITIN) 10 mg tablet Take 1 tablet (10 mg total) by mouth once daily. 90 tablet 3    metFORMIN (GLUCOPHAGE) 1000 MG tablet Take 1 tablet (1,000 mg total) by mouth 2 (two) times daily with meals. 180 tablet 1    metoprolol tartrate (LOPRESSOR)  25 MG tablet Take 1 tablet (25 mg total) by mouth 2 (two) times daily. 60 tablet 0    multivitamin Tab Take 1 tablet by mouth once daily. 30 tablet 2    pantoprazole (PROTONIX) 40 MG tablet Take 1 tablet (40 mg total) by mouth once daily. 30 tablet 0    pravastatin (PRAVACHOL) 40 MG tablet Take 1 tablet (40 mg total) by mouth every evening. 30 tablet 0    risperiDONE (RISPERDAL M-TABS) 3 MG disintegrating tablet Take 1 tablet (3 mg total) by mouth 2 (two) times daily. (Patient taking differently: Take 3 mg by mouth nightly.) 60 tablet 0    VYVANSE 40 mg Cap Take 40 mg by mouth once daily.      albuterol-ipratropium (DUO-NEB) 2.5 mg-0.5 mg/3 mL nebulizer solution Take 3 mLs by nebulization every 6 (six) hours as needed for Wheezing or Shortness of Breath. Rescue 1 each 0    ammonium lactate (LAC-HYDRIN) 12 % lotion APPL Y ONCE TOPICALLY TWICE DAILY FOR 30 DAYS 225 g 0    DUPIXENT  mg/2 mL PnIj Inject into the skin.      fluticasone propionate (FLONASE) 50 mcg/actuation nasal spray 2 sprays (100 mcg total) by Each Nostril route daily as needed (Nasal congestion). 9.9 mL 0    fluticasone-salmeterol diskus inhaler 250-50 mcg Inhale 1 puff into the lungs 2 (two) times daily. Controller 60 each 0    insulin detemir U-100 (LEVEMIR FLEXTOUCH) 100 unit/mL (3 mL) SubQ InPn pen Inject 22 Units into the skin every evening. 6.6 mL 0    LIDOcaine (LIDODERM) 5 % Place 1 patch onto the skin once daily. Remove & Discard patch within 12 hours or as directed by MD  0    magnesium hydroxide 400 mg/5 ml (MILK OF MAGNESIA) 400 mg/5 mL Susp Take 30 mLs (2,400 mg total) by mouth daily as needed.      methocarbamoL (ROBAXIN) 500 MG Tab Take 500 mg by mouth. Frequency could not be confirmed.      nystatin (NYSTOP) powder APPLY TO ABDOMINAL AND BREAST SKIN FOLD TWICE DAILY. 60 g 0    oxyCODONE-acetaminophen (PERCOCET)  mg per tablet Take 1 tablet by mouth every 6 (six) hours as needed for Pain. 16 tablet 0     polyethylene glycol (GLYCOLAX) 17 gram PwPk Take 17 g by mouth once daily. 30 each 0    senna-docusate 8.6-50 mg (PERICOLACE) 8.6-50 mg per tablet Take 1 tablet by mouth 2 (two) times daily.      [DISCONTINUED] diclofenac sodium (VOLTAREN) 1 % Gel Apply 2 g topically 4 (four) times daily as needed (Apply to painful area up to 4 times a day as needed for pain). Apply to painful area 4 times a day as needed for pain 1 Tube 0    [DISCONTINUED] furosemide (LASIX) 20 MG tablet TAKE 1 TABLET(20 MG) BY MOUTH EVERY DAY 90 tablet 1    [DISCONTINUED] QUEtiapine (SEROQUEL) 200 MG Tab Take 1 tablet (200 mg total) by mouth before breakfast. 30 tablet 2       Allergies:  Review of patient's allergies indicates:   Allergen Reactions    Morphine Other (See Comments)     Patient had a psychotic episode after taking Morphine  Agitation, hallucinations    Penicillins Anaphylaxis     Tolerated cephalosporins in the past    Januvia [sitagliptin] Hives    Carbamazepine Other (See Comments)     hyponatremia       Active Problems:  Patient Active Problem List   Diagnosis    Type II diabetes mellitus with neurological manifestations    Essential hypertension    COPD (chronic obstructive pulmonary disease)    Hyperlipidemia    Tobacco abuse    Mild protein malnutrition    Diabetic neuropathy    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    Leg swelling    Incisional hernia without mention of obstruction or gangrene    DM type 2 without retinopathy    History of DVT (deep vein thrombosis)    History of pulmonary embolus (PE)    Bipolar 1 disorder    Gastroparesis due to DM    Cellulitis of left lower extremity    Thrombocytosis    Class 2 severe obesity with serious comorbidity and body mass index (BMI) of 39.0 to 39.9 in adult    Type 2 diabetes mellitus    Other chronic pain    Nuclear sclerosis of both eyes    Bilateral ocular hypertension    Refractive error    Long term (current) use of  anticoagulants    History of pulmonary embolism    DVT, recurrent, lower extremity, chronic, left    Decreased ROM of ankle    Decreased strength of lower extremity    Balance problem    Gait abnormality    Fatty liver    Hepatomegaly    History of bariatric surgery    Need for prophylactic vaccination against hepatitis A and hepatitis B    Liver fibrosis    History of diabetic ulcer of foot    Osteomyelitis of left foot    Acute exacerbation of psychosis    Diabetic ulcer of left midfoot associated with type 2 diabetes mellitus, limited to breakdown of skin    Psychosis    SHAWN (obstructive sleep apnea)    Insomnia    Chronic deep vein thrombosis (DVT) of both lower extremities    Diabetic foot ulcer associated with type 2 diabetes mellitus    Lymphadenopathy, inguinal    Hyponatremia    Osteomyelitis of right foot    Iron deficiency anemia    Cellulitis of left foot    PAD (peripheral artery disease)    Left midfoot ulcer, with necrosis of muscle    Charcot's joint of foot    Open wound of left foot       Diagnostic Studies:   Latest Reference Range & Units 02/27/23 04:53   WBC 3.90 - 12.70 K/uL 12.37   RBC 4.00 - 5.40 M/uL 4.55   Hemoglobin 12.0 - 16.0 g/dL 10.6 (L)   Hematocrit 37.0 - 48.5 % 35.2 (L)   MCV 82 - 98 fL 77 (L)   MCH 27.0 - 31.0 pg 23.3 (L)   MCHC 32.0 - 36.0 g/dL 30.1 (L)   RDW 11.5 - 14.5 % 18.0 (H)   Platelets 150 - 450 K/uL 489 (H)   MPV 9.2 - 12.9 fL 10.5   Platelet Estimate  Increased !   Gran % 38.0 - 73.0 % 38.2   Lymph % 18.0 - 48.0 % 43.8   Mono % 4.0 - 15.0 % 11.6   Eosinophil % 0.0 - 8.0 % 5.2   Basophil % 0.0 - 1.9 % 0.9   Immature Granulocytes 0.0 - 0.5 % 0.3   Gran # (ANC) 1.8 - 7.7 K/uL 4.7   Lymph # 1.0 - 4.8 K/uL 5.4 (H)   Mono # 0.3 - 1.0 K/uL 1.4 (H)   Eos # 0.0 - 0.5 K/uL 0.6 (H)   Baso # 0.00 - 0.20 K/uL 0.11   Immature Grans (Abs) 0.00 - 0.04 K/uL 0.04   nRBC 0 /100 WBC 0   Differential Method  Automated      Latest Reference Range & Units 02/27/23  04:53   Sodium 136 - 145 mmol/L 140   Potassium 3.5 - 5.1 mmol/L 4.7   Chloride 95 - 110 mmol/L 102   CO2 23 - 29 mmol/L 24   Anion Gap 8 - 16 mmol/L 14   BUN 6 - 20 mg/dL 8   Creatinine 0.5 - 1.4 mg/dL 0.6   eGFR >60 mL/min/1.73 m^2 >60   Glucose 70 - 110 mg/dL 168 (H)   Calcium 8.7 - 10.5 mg/dL 9.2   Phosphorus 2.7 - 4.5 mg/dL 4.2   Magnesium 1.6 - 2.6 mg/dL 1.5 (L)   Alkaline Phosphatase 55 - 135 U/L 75   PROTEIN TOTAL 6.0 - 8.4 g/dL 6.9   Albumin 3.5 - 5.2 g/dL 3.1 (L)   BILIRUBIN TOTAL 0.1 - 1.0 mg/dL 0.3   AST 10 - 40 U/L 29   ALT 10 - 44 U/L 10     EKG (12/22/22):  Normal sinus rhythm   Cannot rule out Anterior infarct ,age undetermined    TTE (5/6/22):   Moderate left atrial enlargement.   The left ventricle is normal in size with normal systolic function.   The estimated ejection fraction is 65%.   Normal left ventricular diastolic function.   Normal right ventricular size with normal right ventricular systolic function.   Intermediate central venous pressure (8 mmHg).   The estimated PA systolic pressure is 26 mmHg.   There is no pulmonary hypertension.    24 Hour Vitals:  Temp:  [36.7 °C (98 °F)-37.1 °C (98.8 °F)] 37 °C (98.6 °F)  Pulse:  [64-77] 65  Resp:  [16-18] 18  SpO2:  [93 %-95 %] 94 %  BP: (117-139)/(55-66) 117/58   See Nursing Charting For Additional Vitals      Pre-op Assessment    I have reviewed the Patient Summary Reports.     I have reviewed the Nursing Notes.       Review of Systems  Anesthesia Hx:  No problems with previous Anesthesia   Denies Personal Hx of Anesthesia complications.   Social:  Smoker, No Alcohol Use    Hematology/Oncology:         -- Anemia: Hematology Comments: History of DVT/PE. PE was about 7 years ago. Daily eliquis.   Cardiovascular:   Exercise tolerance: good Hypertension CAD   PVD hyperlipidemia ECG has been reviewed.    Pulmonary:   COPD Asthma Sleep Apnea    Hepatic/GI:  Hepatic/GI Normal    Musculoskeletal:   Arthritis  L diabetic foot ulcer    Neurological:  Neurology Normal    Endocrine:   Diabetes  Obesity / BMI > 30  Psych:   Psychiatric History          Physical Exam  General: Well nourished and Cooperative    Airway:  Mallampati: II   Mouth Opening: Normal  TM Distance: Normal      Dental:    Chest/Lungs:  Clear to auscultation, Normal Respiratory Rate    Heart:  Rate: Normal  Rhythm: Regular Rhythm        Anesthesia Plan  Type of Anesthesia, risks & benefits discussed:    Anesthesia Type: MAC, Gen Natural Airway, Gen Supraglottic Airway  Intra-op Monitoring Plan: Standard ASA Monitors  Post Op Pain Control Plan: multimodal analgesia and IV/PO Opioids PRN  Induction:  IV  Informed Consent: Informed consent signed with the Patient and all parties understand the risks and agree with anesthesia plan.  All questions answered. Patient consented to blood products? Yes  ASA Score: 3  Anesthesia Plan Notes:       Ready For Surgery From Anesthesia Perspective.     .

## 2023-02-28 ENCOUNTER — ANESTHESIA (OUTPATIENT)
Dept: SURGERY | Facility: HOSPITAL | Age: 53
DRG: 623 | End: 2023-02-28
Payer: MEDICAID

## 2023-02-28 LAB
ALBUMIN SERPL BCP-MCNC: 3.2 G/DL (ref 3.5–5.2)
ALP SERPL-CCNC: 92 U/L (ref 55–135)
ALT SERPL W/O P-5'-P-CCNC: 36 U/L (ref 10–44)
ANION GAP SERPL CALC-SCNC: 10 MMOL/L (ref 8–16)
AST SERPL-CCNC: 50 U/L (ref 10–40)
BACTERIA SPEC ANAEROBE CULT: NORMAL
BASOPHILS # BLD AUTO: 0.15 K/UL (ref 0–0.2)
BASOPHILS NFR BLD: 1.3 % (ref 0–1.9)
BILIRUB SERPL-MCNC: 0.4 MG/DL (ref 0.1–1)
BUN SERPL-MCNC: 9 MG/DL (ref 6–20)
CALCIUM SERPL-MCNC: 9.5 MG/DL (ref 8.7–10.5)
CHLORIDE SERPL-SCNC: 102 MMOL/L (ref 95–110)
CO2 SERPL-SCNC: 30 MMOL/L (ref 23–29)
CREAT SERPL-MCNC: 0.6 MG/DL (ref 0.5–1.4)
DIFFERENTIAL METHOD: ABNORMAL
EOSINOPHIL # BLD AUTO: 0.6 K/UL (ref 0–0.5)
EOSINOPHIL NFR BLD: 5.4 % (ref 0–8)
ERYTHROCYTE [DISTWIDTH] IN BLOOD BY AUTOMATED COUNT: 18.1 % (ref 11.5–14.5)
EST. GFR  (NO RACE VARIABLE): >60 ML/MIN/1.73 M^2
GLUCOSE SERPL-MCNC: 153 MG/DL (ref 70–110)
HCT VFR BLD AUTO: 35.7 % (ref 37–48.5)
HGB BLD-MCNC: 10.9 G/DL (ref 12–16)
IMM GRANULOCYTES # BLD AUTO: 0.05 K/UL (ref 0–0.04)
IMM GRANULOCYTES NFR BLD AUTO: 0.4 % (ref 0–0.5)
LYMPHOCYTES # BLD AUTO: 4.8 K/UL (ref 1–4.8)
LYMPHOCYTES NFR BLD: 42 % (ref 18–48)
MAGNESIUM SERPL-MCNC: 1.6 MG/DL (ref 1.6–2.6)
MCH RBC QN AUTO: 23.4 PG (ref 27–31)
MCHC RBC AUTO-ENTMCNC: 30.5 G/DL (ref 32–36)
MCV RBC AUTO: 77 FL (ref 82–98)
MONOCYTES # BLD AUTO: 1.4 K/UL (ref 0.3–1)
MONOCYTES NFR BLD: 12.1 % (ref 4–15)
NEUTROPHILS # BLD AUTO: 4.4 K/UL (ref 1.8–7.7)
NEUTROPHILS NFR BLD: 38.8 % (ref 38–73)
NRBC BLD-RTO: 0 /100 WBC
PHOSPHATE SERPL-MCNC: 4.7 MG/DL (ref 2.7–4.5)
PLATELET # BLD AUTO: 707 K/UL (ref 150–450)
PLATELET BLD QL SMEAR: ABNORMAL
PMV BLD AUTO: 9.8 FL (ref 9.2–12.9)
POCT GLUCOSE: 124 MG/DL (ref 70–110)
POCT GLUCOSE: 144 MG/DL (ref 70–110)
POCT GLUCOSE: 147 MG/DL (ref 70–110)
POCT GLUCOSE: 81 MG/DL (ref 70–110)
POTASSIUM SERPL-SCNC: 4.3 MMOL/L (ref 3.5–5.1)
PROT SERPL-MCNC: 6.7 G/DL (ref 6–8.4)
RBC # BLD AUTO: 4.65 M/UL (ref 4–5.4)
SODIUM SERPL-SCNC: 142 MMOL/L (ref 136–145)
VANCOMYCIN SERPL-MCNC: 2.4 UG/ML
WBC # BLD AUTO: 11.3 K/UL (ref 3.9–12.7)

## 2023-02-28 PROCEDURE — 87116 MYCOBACTERIA CULTURE: CPT | Performed by: PODIATRIST

## 2023-02-28 PROCEDURE — 25000003 PHARM REV CODE 250: Performed by: PODIATRIST

## 2023-02-28 PROCEDURE — 63600175 PHARM REV CODE 636 W HCPCS: Mod: TB,JG | Performed by: PODIATRIST

## 2023-02-28 PROCEDURE — 99900035 HC TECH TIME PER 15 MIN (STAT)

## 2023-02-28 PROCEDURE — 71000033 HC RECOVERY, INTIAL HOUR: Performed by: PODIATRIST

## 2023-02-28 PROCEDURE — 36000706: Performed by: PODIATRIST

## 2023-02-28 PROCEDURE — 63600175 PHARM REV CODE 636 W HCPCS: Performed by: NURSE ANESTHETIST, CERTIFIED REGISTERED

## 2023-02-28 PROCEDURE — 25000003 PHARM REV CODE 250: Performed by: NURSE PRACTITIONER

## 2023-02-28 PROCEDURE — 63600175 PHARM REV CODE 636 W HCPCS: Performed by: PODIATRIST

## 2023-02-28 PROCEDURE — 63600175 PHARM REV CODE 636 W HCPCS: Performed by: HOSPITALIST

## 2023-02-28 PROCEDURE — 37000009 HC ANESTHESIA EA ADD 15 MINS: Performed by: PODIATRIST

## 2023-02-28 PROCEDURE — 20220 PR BONE BIOPSY,TROCAR/NEEDLE SUPERF: ICD-10-PCS | Mod: 51,,, | Performed by: PODIATRIST

## 2023-02-28 PROCEDURE — 88311 PR  DECALCIFY TISSUE: ICD-10-PCS | Mod: 26,,, | Performed by: PATHOLOGY

## 2023-02-28 PROCEDURE — 87102 FUNGUS ISOLATION CULTURE: CPT | Performed by: PODIATRIST

## 2023-02-28 PROCEDURE — 25000003 PHARM REV CODE 250: Performed by: HOSPITALIST

## 2023-02-28 PROCEDURE — 99232 PR SUBSEQUENT HOSPITAL CARE,LEVL II: ICD-10-PCS | Mod: ,,, | Performed by: INTERNAL MEDICINE

## 2023-02-28 PROCEDURE — 88311 DECALCIFY TISSUE: CPT | Mod: 26,,, | Performed by: PATHOLOGY

## 2023-02-28 PROCEDURE — 87205 SMEAR GRAM STAIN: CPT | Performed by: PODIATRIST

## 2023-02-28 PROCEDURE — D9220A PRA ANESTHESIA: ICD-10-PCS | Mod: ANES,,, | Performed by: ANESTHESIOLOGY

## 2023-02-28 PROCEDURE — 94761 N-INVAS EAR/PLS OXIMETRY MLT: CPT

## 2023-02-28 PROCEDURE — 83735 ASSAY OF MAGNESIUM: CPT | Performed by: STUDENT IN AN ORGANIZED HEALTH CARE EDUCATION/TRAINING PROGRAM

## 2023-02-28 PROCEDURE — 15275 WOUND DEBRIDEMENT: ICD-10-PCS | Mod: ,,, | Performed by: PODIATRIST

## 2023-02-28 PROCEDURE — 15276 WOUND DEBRIDEMENT: ICD-10-PCS | Mod: ,,, | Performed by: PODIATRIST

## 2023-02-28 PROCEDURE — 84100 ASSAY OF PHOSPHORUS: CPT | Performed by: STUDENT IN AN ORGANIZED HEALTH CARE EDUCATION/TRAINING PROGRAM

## 2023-02-28 PROCEDURE — 36000707: Performed by: PODIATRIST

## 2023-02-28 PROCEDURE — 80053 COMPREHEN METABOLIC PANEL: CPT | Performed by: STUDENT IN AN ORGANIZED HEALTH CARE EDUCATION/TRAINING PROGRAM

## 2023-02-28 PROCEDURE — 88305 TISSUE EXAM BY PATHOLOGIST: CPT | Performed by: PATHOLOGY

## 2023-02-28 PROCEDURE — 87206 SMEAR FLUORESCENT/ACID STAI: CPT | Performed by: PODIATRIST

## 2023-02-28 PROCEDURE — 71000039 HC RECOVERY, EACH ADD'L HOUR: Performed by: PODIATRIST

## 2023-02-28 PROCEDURE — 63600175 PHARM REV CODE 636 W HCPCS: Performed by: ANESTHESIOLOGY

## 2023-02-28 PROCEDURE — D9220A PRA ANESTHESIA: ICD-10-PCS | Mod: CRNA,,, | Performed by: NURSE ANESTHETIST, CERTIFIED REGISTERED

## 2023-02-28 PROCEDURE — 37000008 HC ANESTHESIA 1ST 15 MINUTES: Performed by: PODIATRIST

## 2023-02-28 PROCEDURE — 63600175 PHARM REV CODE 636 W HCPCS: Mod: TB,JG | Performed by: HOSPITALIST

## 2023-02-28 PROCEDURE — 25000003 PHARM REV CODE 250: Performed by: NURSE ANESTHETIST, CERTIFIED REGISTERED

## 2023-02-28 PROCEDURE — 88305 TISSUE EXAM BY PATHOLOGIST: CPT | Mod: 26,,, | Performed by: PATHOLOGY

## 2023-02-28 PROCEDURE — 11000001 HC ACUTE MED/SURG PRIVATE ROOM

## 2023-02-28 PROCEDURE — 87075 CULTR BACTERIA EXCEPT BLOOD: CPT | Performed by: PODIATRIST

## 2023-02-28 PROCEDURE — D9220A PRA ANESTHESIA: Mod: CRNA,,, | Performed by: NURSE ANESTHETIST, CERTIFIED REGISTERED

## 2023-02-28 PROCEDURE — 88305 TISSUE EXAM BY PATHOLOGIST: ICD-10-PCS | Mod: 26,,, | Performed by: PATHOLOGY

## 2023-02-28 PROCEDURE — 27201423 OPTIME MED/SURG SUP & DEVICES STERILE SUPPLY: Performed by: PODIATRIST

## 2023-02-28 PROCEDURE — 85025 COMPLETE CBC W/AUTO DIFF WBC: CPT | Performed by: STUDENT IN AN ORGANIZED HEALTH CARE EDUCATION/TRAINING PROGRAM

## 2023-02-28 PROCEDURE — 20220 BONE BIOPSY TROCAR/NDL SUPFC: CPT | Mod: 51,,, | Performed by: PODIATRIST

## 2023-02-28 PROCEDURE — D9220A PRA ANESTHESIA: Mod: ANES,,, | Performed by: ANESTHESIOLOGY

## 2023-02-28 PROCEDURE — 15275 SKIN SUB GRAFT FACE/NK/HF/G: CPT | Mod: ,,, | Performed by: PODIATRIST

## 2023-02-28 PROCEDURE — 87070 CULTURE OTHR SPECIMN AEROBIC: CPT | Performed by: PODIATRIST

## 2023-02-28 PROCEDURE — 36415 COLL VENOUS BLD VENIPUNCTURE: CPT | Performed by: STUDENT IN AN ORGANIZED HEALTH CARE EDUCATION/TRAINING PROGRAM

## 2023-02-28 PROCEDURE — 15276 SKIN SUB GRAFT F/N/HF/G ADDL: CPT | Mod: ,,, | Performed by: PODIATRIST

## 2023-02-28 PROCEDURE — 80202 ASSAY OF VANCOMYCIN: CPT | Performed by: HOSPITALIST

## 2023-02-28 PROCEDURE — 99232 SBSQ HOSP IP/OBS MODERATE 35: CPT | Mod: ,,, | Performed by: INTERNAL MEDICINE

## 2023-02-28 PROCEDURE — C1889 IMPLANT/INSERT DEVICE, NOC: HCPCS | Performed by: PODIATRIST

## 2023-02-28 PROCEDURE — 36415 COLL VENOUS BLD VENIPUNCTURE: CPT | Performed by: HOSPITALIST

## 2023-02-28 RX ORDER — ONDANSETRON 2 MG/ML
INJECTION INTRAMUSCULAR; INTRAVENOUS
Status: DISCONTINUED | OUTPATIENT
Start: 2023-02-28 | End: 2023-02-28

## 2023-02-28 RX ORDER — SODIUM CHLORIDE 0.9 % (FLUSH) 0.9 %
3 SYRINGE (ML) INJECTION
Status: DISCONTINUED | OUTPATIENT
Start: 2023-02-28 | End: 2023-03-03 | Stop reason: HOSPADM

## 2023-02-28 RX ORDER — CEFEPIME HYDROCHLORIDE 1 G/50ML
2 INJECTION, SOLUTION INTRAVENOUS
Status: DISCONTINUED | OUTPATIENT
Start: 2023-02-28 | End: 2023-03-03 | Stop reason: HOSPADM

## 2023-02-28 RX ORDER — HYDROCODONE BITARTRATE AND ACETAMINOPHEN 5; 325 MG/1; MG/1
1 TABLET ORAL EVERY 6 HOURS PRN
Status: DISCONTINUED | OUTPATIENT
Start: 2023-02-28 | End: 2023-03-02

## 2023-02-28 RX ORDER — LIDOCAINE HYDROCHLORIDE 20 MG/ML
INJECTION, SOLUTION INFILTRATION; PERINEURAL
Status: DISCONTINUED | OUTPATIENT
Start: 2023-02-28 | End: 2023-02-28 | Stop reason: HOSPADM

## 2023-02-28 RX ORDER — BENZOIN
TINCTURE TOPICAL
Status: DISCONTINUED | OUTPATIENT
Start: 2023-02-28 | End: 2023-02-28 | Stop reason: HOSPADM

## 2023-02-28 RX ORDER — PROPOFOL 10 MG/ML
VIAL (ML) INTRAVENOUS CONTINUOUS PRN
Status: DISCONTINUED | OUTPATIENT
Start: 2023-02-28 | End: 2023-02-28

## 2023-02-28 RX ORDER — BUPIVACAINE HYDROCHLORIDE 5 MG/ML
INJECTION, SOLUTION PERINEURAL
Status: DISCONTINUED | OUTPATIENT
Start: 2023-02-28 | End: 2023-02-28 | Stop reason: HOSPADM

## 2023-02-28 RX ORDER — LIDOCAINE HYDROCHLORIDE 20 MG/ML
INJECTION INTRAVENOUS
Status: DISCONTINUED | OUTPATIENT
Start: 2023-02-28 | End: 2023-02-28

## 2023-02-28 RX ORDER — HYDROMORPHONE HYDROCHLORIDE 1 MG/ML
1 INJECTION, SOLUTION INTRAMUSCULAR; INTRAVENOUS; SUBCUTANEOUS EVERY 6 HOURS PRN
Status: DISCONTINUED | OUTPATIENT
Start: 2023-02-28 | End: 2023-03-01

## 2023-02-28 RX ORDER — MIDAZOLAM HYDROCHLORIDE 1 MG/ML
INJECTION, SOLUTION INTRAMUSCULAR; INTRAVENOUS
Status: DISCONTINUED | OUTPATIENT
Start: 2023-02-28 | End: 2023-02-28

## 2023-02-28 RX ORDER — HYDROMORPHONE HYDROCHLORIDE 2 MG/ML
0.2 INJECTION, SOLUTION INTRAMUSCULAR; INTRAVENOUS; SUBCUTANEOUS EVERY 5 MIN PRN
Status: DISCONTINUED | OUTPATIENT
Start: 2023-02-28 | End: 2023-02-28

## 2023-02-28 RX ORDER — FENTANYL CITRATE 50 UG/ML
25 INJECTION, SOLUTION INTRAMUSCULAR; INTRAVENOUS EVERY 5 MIN PRN
Status: COMPLETED | OUTPATIENT
Start: 2023-02-28 | End: 2023-02-28

## 2023-02-28 RX ORDER — FENTANYL CITRATE 50 UG/ML
INJECTION, SOLUTION INTRAMUSCULAR; INTRAVENOUS
Status: DISCONTINUED | OUTPATIENT
Start: 2023-02-28 | End: 2023-02-28

## 2023-02-28 RX ADMIN — FENTANYL CITRATE 25 MCG: 50 INJECTION, SOLUTION INTRAMUSCULAR; INTRAVENOUS at 02:02

## 2023-02-28 RX ADMIN — ONDANSETRON 4 MG: 2 INJECTION, SOLUTION INTRAMUSCULAR; INTRAVENOUS at 01:02

## 2023-02-28 RX ADMIN — HYDROMORPHONE HYDROCHLORIDE 1 MG: 1 INJECTION, SOLUTION INTRAMUSCULAR; INTRAVENOUS; SUBCUTANEOUS at 11:02

## 2023-02-28 RX ADMIN — HYDROMORPHONE HYDROCHLORIDE 1 MG: 1 INJECTION, SOLUTION INTRAMUSCULAR; INTRAVENOUS; SUBCUTANEOUS at 05:02

## 2023-02-28 RX ADMIN — VANCOMYCIN HYDROCHLORIDE 1750 MG: 500 INJECTION, POWDER, LYOPHILIZED, FOR SOLUTION INTRAVENOUS at 04:02

## 2023-02-28 RX ADMIN — MICONAZOLE NITRATE 1 APPLICATOR: 20 CREAM VAGINAL at 09:02

## 2023-02-28 RX ADMIN — ONDANSETRON 4 MG: 2 INJECTION INTRAMUSCULAR; INTRAVENOUS at 04:02

## 2023-02-28 RX ADMIN — HYDROCODONE BITARTRATE AND ACETAMINOPHEN 1 TABLET: 5; 325 TABLET ORAL at 09:02

## 2023-02-28 RX ADMIN — MIDAZOLAM HYDROCHLORIDE 2 MG: 1 INJECTION, SOLUTION INTRAMUSCULAR; INTRAVENOUS at 12:02

## 2023-02-28 RX ADMIN — HYDROMORPHONE HYDROCHLORIDE 0.2 MG: 2 INJECTION INTRAMUSCULAR; INTRAVENOUS; SUBCUTANEOUS at 03:02

## 2023-02-28 RX ADMIN — LIDOCAINE HYDROCHLORIDE 60 MG: 20 INJECTION, SOLUTION INTRAVENOUS at 01:02

## 2023-02-28 RX ADMIN — CEFEPIME HYDROCHLORIDE 2 G: 2 INJECTION, SOLUTION INTRAVENOUS at 12:02

## 2023-02-28 RX ADMIN — INSULIN DETEMIR 15 UNITS: 100 INJECTION, SOLUTION SUBCUTANEOUS at 09:02

## 2023-02-28 RX ADMIN — HYDROMORPHONE HYDROCHLORIDE 1 MG: 1 INJECTION, SOLUTION INTRAMUSCULAR; INTRAVENOUS; SUBCUTANEOUS at 02:02

## 2023-02-28 RX ADMIN — DIVALPROEX SODIUM 500 MG: 250 TABLET, DELAYED RELEASE ORAL at 09:02

## 2023-02-28 RX ADMIN — PROPOFOL 50 MCG/KG/MIN: 10 INJECTION, EMULSION INTRAVENOUS at 01:02

## 2023-02-28 RX ADMIN — HYDROMORPHONE HYDROCHLORIDE 1 MG: 1 INJECTION, SOLUTION INTRAMUSCULAR; INTRAVENOUS; SUBCUTANEOUS at 09:02

## 2023-02-28 RX ADMIN — SODIUM CHLORIDE, SODIUM LACTATE, POTASSIUM CHLORIDE, AND CALCIUM CHLORIDE: .6; .31; .03; .02 INJECTION, SOLUTION INTRAVENOUS at 12:02

## 2023-02-28 RX ADMIN — METOPROLOL TARTRATE 25 MG: 25 TABLET, FILM COATED ORAL at 09:02

## 2023-02-28 RX ADMIN — FENTANYL CITRATE 25 MCG: 50 INJECTION, SOLUTION INTRAMUSCULAR; INTRAVENOUS at 03:02

## 2023-02-28 RX ADMIN — CEFEPIME HYDROCHLORIDE 2 G: 2 INJECTION, SOLUTION INTRAVENOUS at 09:02

## 2023-02-28 RX ADMIN — FENTANYL CITRATE 50 MCG: 0.05 INJECTION, SOLUTION INTRAMUSCULAR; INTRAVENOUS at 01:02

## 2023-02-28 RX ADMIN — LISINOPRIL 10 MG: 5 TABLET ORAL at 09:02

## 2023-02-28 RX ADMIN — PRAVASTATIN SODIUM 40 MG: 40 TABLET ORAL at 09:02

## 2023-02-28 RX ADMIN — RISPERIDONE 3 MG: 1 SOLUTION ORAL at 09:02

## 2023-02-28 RX ADMIN — ENOXAPARIN SODIUM 40 MG: 40 INJECTION SUBCUTANEOUS at 04:02

## 2023-02-28 NOTE — CONSULTS
"[facility]  Infectious Disease  Consult Note    Patient Name: Audrey Natarajan  MRN: [id number]  Admission Date: 2/23/2023  Hospital Length of Stay: 3 days  Attending Physician: Keyona Darden MD  Primary Care Provider: Donaldo Pena MD     Isolation Status: No active isolations    Patient information was obtained from patient and ER records.      Consults  Assessment/Plan:     Orthopedic  Charcot's joint of foot  53y/o M with h/o DM with charcot foot, DVT on Eliquis, PAD, ongoing tobacco abuse admitted 2/23 with chronic L foot wound and worsening pain/swelling. Notably just completed 4 weeks vancomycin at LTAC for possible (note prior bone biopsy path/cultures 1/13 negative for OM). LLE Dvt studies negative. S/p podiatry debridement at bedside - gram stain negative, cultures rare growth of pseudomonas. Currently on vancomycin. ID consulted for "foot wound, leukocytosis, elevated lactic"    JEYSON with  hemodynamically significant stenosis in the left and DPA. Multifocal mild to moderate grade stenoses is suspected throughout the left MIRACLE and, bilateral posterior tibial and peroneal arteries.    Not clear that infection is playing role in chronic foot ulcer. 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 MRI evidence of osteo could be recently treated infection.  Likely poor wound healing from combination from  DM, ongoing tobacco abuse, PAD and psychosocial factors    Recommendations:   - agree with bone biopsy for path/culture; would not commit to another 6 weeks of abx without positive pathology/bone cultures  - hold antibiotics to increase yield of culture. Post biopsy, agree with vanc/cefepime (and de-escalation pending culture results)  - defer need for amputation to podiatry, but would likely be definitive treatment of infection  - counseled on need for tobacco cessation  - please ensure she has adequate perfusion to heal " "wounds             Thank you for your consult. I will follow-up with patient. Please contact us if you have any additional questions.    Beverly Zavala MD  Infectious Disease  [facility] - Med Surg    Subjective:     Principal Problem: Osteomyelitis of left foot    HPI:   51y/o M with h/o DM with charcot foot, DVT on Eliquis, PAD, ongoing tobacco abuse admitted 2/23 with chronic L foot wound and worsening pain/swelling. Notably just completed 4 weeks vancomycin at LTAC for possible (note prior bone biopsy path/cultures 1/13 negative for OM). Wound improved and filling in since completing vancomycin. Reports using scooter to keep weight off of foot. Going to wound care. Denies f/c.     S/p podiatry debridement at bedside today    Currently on vancomycin      1 mo ago    Final Pathologic Diagnosis Bone, left foot, biopsy:   Viable bone with osteonecrosis-no acute osteomyelitis identified     ID consulted for "foot wound, leukocytosis, elevated lactic"             Interval history: NAEO. Awaiting OR debridement/biopsy with podiatry today        Past Surgical History:   Procedure Laterality Date    ABDOMINAL SURGERY  2010    gastric sleeve    BILATERAL OOPHORECTOMY Bilateral 1/12/2015    CHOLECYSTECTOMY      DEBRIDEMENT OF FOOT Bilateral 5/10/2022    Procedure: DEBRIDEMENT, FOOT;  Surgeon: Maira De Los Santos DPM;  Location: [facility] OR;  Service: Podiatry;  Laterality: Bilateral;    Green' s filter Right 7/4/2012    Right Neck & Tunneled Down.    HERNIA REPAIR      "[place] of Hernias Repaires around th [place].", pt. states    INCISION AND DRAINAGE FOOT Left 12/24/2022    Procedure: INCISION AND DRAINAGE, FOOT;  Surgeon: Fahad Razo DPM;  Location: [facility] OR;  Service: Podiatry;  Laterality: Left;    LAPAROSCOPIC CHOLECYSTECTOMY N/A 9/10/2020    Procedure: CHOLECYSTECTOMY, LAPAROSCOPIC;  Surgeon: Montrell Gutierrez MD;  Location: [facility] OR;  Service: General;  Laterality: N/A;  RN PREOP 9/9----COVID Negative  9/9    " OVARIAN CYST REMOVAL  3/13/2014    AL REMOVAL OF OVARY/TUBE(S)      SPLENECTOMY, TOTAL  July 2003    TONSILLECTOMY      as a child    TYMPANOSTOMY TUBE PLACEMENT  1976    VEIN SURGERY  2003    Lt leg       Review of patient's allergies indicates:   Allergen Reactions    Morphine Other (See Comments)     Patient had a psychotic episode after taking Morphine  Agitation, hallucinations    Penicillins Anaphylaxis     Tolerated cephalosporins in the past    Januvia [sitagliptin] Hives    Carbamazepine Other (See Comments)     hyponatremia       No current facility-administered medications on file prior to encounter.     Current Outpatient Medications on File Prior to Encounter   Medication Sig    acetaminophen (TYLENOL) 500 MG tablet Take 2 tablets (1,000 mg total) by mouth every 6 (six) hours as needed for Pain.    albuterol (PROVENTIL/VENTOLIN HFA) 90 mcg/actuation inhaler INHALE 2 PUFFS INTO THE LUNGS EVERY 6 HOURS AS NEEDED FOR WHEEZING. RESCUE    apixaban (ELIQUIS) 5 mg Tab Take 1 tablet (5 mg total) by mouth 2 (two) times daily.    aspirin 81 MG Chew Take 1 tablet (81 mg total) by mouth once daily.    bumetanide (BUMEX) 1 MG tablet Take 1 tablet (1 mg total) by mouth once daily.    divalproex (DEPAKOTE) 500 MG TbEC Take 1 tablet (500 mg total) by mouth once daily. PO QAM    ferrous sulfate 325 (65 FE) MG EC tablet Take 1 tablet (325 mg total) by mouth once daily.    hydrOXYzine (ATARAX) 50 MG tablet Take 0.5 tablets (25 mg total) by mouth 4 (four) times daily as needed for Itching or Anxiety.    lisinopriL 10 MG tablet Take 1 tablet (10 mg total) by mouth once daily.    loratadine (CLARITIN) 10 mg tablet Take 1 tablet (10 mg total) by mouth once daily.    metFORMIN (GLUCOPHAGE) 1000 MG tablet Take 1 tablet (1,000 mg total) by mouth 2 (two) times daily with meals.    metoprolol tartrate (LOPRESSOR) 25 MG tablet Take 1 tablet (25 mg total) by mouth 2 (two) times daily.    multivitamin Tab Take 1  tablet by mouth once daily.    pantoprazole (PROTONIX) 40 MG tablet Take 1 tablet (40 mg total) by mouth once daily.    pravastatin (PRAVACHOL) 40 MG tablet Take 1 tablet (40 mg total) by mouth every evening.    risperiDONE (RISPERDAL M-TABS) 3 MG disintegrating tablet Take 1 tablet (3 mg total) by mouth 2 (two) times daily. (Patient taking differently: Take 3 mg by mouth nightly.)    VYVANSE 40 mg Cap Take 40 mg by mouth once daily.    albuterol-ipratropium (DUO-NEB) 2.5 mg-0.5 mg/3 mL nebulizer solution Take 3 mLs by nebulization every 6 (six) hours as needed for Wheezing or Shortness of Breath. Rescue    ammonium lactate (LAC-HYDRIN) 12 % lotion APPL Y ONCE TOPICALLY TWICE DAILY FOR 30 DAYS    DUPIXENT  mg/2 mL PnIj Inject into the skin.    fluticasone propionate (FLONASE) 50 mcg/actuation nasal spray 2 sprays (100 mcg total) by Each Nostril route daily as needed (Nasal congestion).    fluticasone-salmeterol diskus inhaler 250-50 mcg Inhale 1 puff into the lungs 2 (two) times daily. Controller    insulin detemir U-100 (LEVEMIR FLEXTOUCH) 100 unit/mL (3 mL) SubQ InPn pen Inject 22 Units into the skin every evening.    LIDOcaine (LIDODERM) 5 % Place 1 patch onto the skin once daily. Remove & Discard patch within 12 hours or as directed by MD    magnesium hydroxide 400 mg/5 ml (MILK OF MAGNESIA) 400 mg/5 mL Susp Take 30 mLs (2,400 mg total) by mouth daily as needed.    methocarbamoL (ROBAXIN) 500 MG Tab Take 500 mg by mouth. Frequency could not be confirmed.    nystatin (NYSTOP) powder APPLY TO ABDOMINAL AND BREAST SKIN FOLD TWICE DAILY.    oxyCODONE-acetaminophen (PERCOCET)  mg per tablet Take 1 tablet by mouth every 6 (six) hours as needed for Pain.    polyethylene glycol (GLYCOLAX) 17 gram PwPk Take 17 g by mouth once daily.    senna-docusate 8.6-50 mg (PERICOLACE) 8.6-50 mg per tablet Take 1 tablet by mouth 2 (two) times daily.    [DISCONTINUED] diclofenac sodium (VOLTAREN) 1 % Gel  Apply 2 g topically 4 (four) times daily as needed (Apply to painful area up to 4 times a day as needed for pain). Apply to painful area 4 times a day as needed for pain    [DISCONTINUED] furosemide (LASIX) 20 MG tablet TAKE 1 TABLET(20 MG) BY MOUTH EVERY DAY    [DISCONTINUED] QUEtiapine (SEROQUEL) 200 MG Tab Take 1 tablet (200 mg total) by mouth before breakfast.     Family History       Problem Relation (Age of Onset)    Cataracts Father    Diabetes Father, Paternal Grandfather    Heart disease Father, Paternal Grandfather    Hypertension Father    No Known Problems Mother, Sister, Brother, Maternal Aunt, Maternal Uncle, Paternal Aunt, Paternal Uncle, Maternal Grandfather    Ovarian cancer Maternal Grandmother, Paternal Grandmother          Tobacco Use    Smoking status: Every Day     Packs/day: 1.00     Years: 37.00     Pack years: 37.00     Types: Cigarettes     Last attempt to quit: 2020     Years since quittin.2    Smokeless tobacco: Current    Tobacco comments:     Enrolled in the [employer] on 5/3/14 ([facility] Member ID # [id number]). Ambulatory referral to Smoking Cessation Program   Substance and Sexual Activity    Alcohol use: No     Alcohol/week: 0.0 standard drinks    Drug use: No    Sexual activity: Yes     Partners: Male     Review of Systems   Constitutional:  Negative for chills, fatigue and fever.   HENT:  Negative for congestion and rhinorrhea.    Eyes:  Negative for photophobia and visual disturbance.   Respiratory:  Negative for cough and shortness of breath.    Cardiovascular:  Positive for leg swelling. Negative for chest pain and palpitations.   Gastrointestinal:  Negative for abdominal pain, diarrhea, nausea and vomiting.   Genitourinary:  Negative for dysuria, frequency and urgency.   Musculoskeletal:  Positive for arthralgias and myalgias.   Skin:  Positive for color change and wound. Negative for pallor and rash.   Neurological:  Negative for light-headedness and  headaches.   Psychiatric/Behavioral:  Negative for confusion and decreased concentration.    Objective:     Vital Signs (Most Recent):  Temp: 98.1 °F (36.7 °C) (02/28/23 1129)  Pulse: 63 (02/28/23 1129)  Resp: 18 (02/28/23 1129)  BP: 133/85 (02/28/23 1129)  SpO2: 97 % (02/28/23 1129)   Vital Signs (24h Range):  Temp:  [97.6 °F (36.4 °C)-98.7 °F (37.1 °C)] 98.1 °F (36.7 °C)  Pulse:  [63-82] 63  Resp:  [16-19] 18  SpO2:  [96 %-99 %] 97 %  BP: (123-169)/(60-85) 133/85     Weight: 104 kg (229 lb 4.5 oz)  Body mass index is 37.01 kg/m².    Physical Exam  Vitals and nursing note reviewed.   Constitutional:       General: She is not in acute distress.     Appearance: She is well-developed.   HENT:      Head: Normocephalic and atraumatic.      Right Ear: External ear normal.      Left Ear: External ear normal.      Nose: Nose normal.   Eyes:      Conjunctiva/sclera: Conjunctivae normal.      Pupils: Pupils are equal, round, and reactive to light.   Cardiovascular:      Rate and Rhythm: Normal rate and regular rhythm.   Pulmonary:      Effort: Pulmonary effort is normal. No respiratory distress.      Breath sounds: Normal breath sounds. No wheezing.   Abdominal:      General: Bowel sounds are normal. There is no distension.      Palpations: Abdomen is soft.      Tenderness: There is no abdominal tenderness.      Comments: No palpable hepatomegaly or splenomegaly    Musculoskeletal:         General: No tenderness. Normal range of motion.      Cervical back: Normal range of motion and neck supple.      Comments: Dressing c/d/i   Skin:     General: Skin is warm and dry.   Neurological:      Mental Status: She is alert and oriented to person, place, and time.   Psychiatric:         Thought Content: Thought content normal.         CRANIAL NERVES     CN III, IV, VI   Pupils are equal, round, and reactive to light.     Significant Labs: All pertinent labs within the past 24 hours have been reviewed.    Significant Imaging: I have  reviewed all pertinent imaging results/findings within the past 24 hours.

## 2023-02-28 NOTE — ASSESSMENT & PLAN NOTE
Patient's FSGs are controlled on current medication regimen.  Last A1c reviewed-   Lab Results   Component Value Date    HGBA1C 7.1 (H) 12/22/2022     Most recent fingerstick glucose reviewed-   Recent Labs   Lab 02/27/23  1636 02/27/23  1944 02/28/23  0708 02/28/23  1127   POCTGLUCOSE 259* 276* 147* 144*     Current correctional scale  Medium  Maintain anti-hyperglycemic dose as follows-   Antihyperglycemics (From admission, onward)    Start     Stop Route Frequency Ordered    02/23/23 2330  insulin detemir U-100 pen 15 Units         -- SubQ Nightly 02/23/23 2229    02/23/23 2328  insulin aspart U-100 pen 1-10 Units         -- SubQ Before meals & nightly PRN 02/23/23 2229      Diabetic Diet  Hold Oral hypoglycemics while patient is in the hospital.

## 2023-02-28 NOTE — SUBJECTIVE & OBJECTIVE
"Interval history: NAEO. Awaiting OR debridement/biopsy with podiatry today        Past Surgical History:   Procedure Laterality Date    ABDOMINAL SURGERY  2010    gastric sleeve    BILATERAL OOPHORECTOMY Bilateral 1/12/2015    CHOLECYSTECTOMY      DEBRIDEMENT OF FOOT Bilateral 5/10/2022    Procedure: DEBRIDEMENT, FOOT;  Surgeon: Maira De Los Santos DPM;  Location: [facility] OR;  Service: Podiatry;  Laterality: Bilateral;    Green' s filter Right 7/4/2012    Right Neck & Tunneled Down.    HERNIA REPAIR      "[place] of Hernias Repaires around th [address].", pt. states    INCISION AND DRAINAGE FOOT Left 12/24/2022    Procedure: INCISION AND DRAINAGE, FOOT;  Surgeon: Fahad Razo DPM;  Location: [facility] OR;  Service: Podiatry;  Laterality: Left;    LAPAROSCOPIC CHOLECYSTECTOMY N/A 9/10/2020    Procedure: CHOLECYSTECTOMY, LAPAROSCOPIC;  Surgeon: Montrell Gutierrez MD;  Location: [facility] OR;  Service: General;  Laterality: N/A;  RN PREOP 9/9----COVID Negative  9/9    OVARIAN CYST REMOVAL  3/13/2014    ID REMOVAL OF OVARY/TUBE(S)      SPLENECTOMY, TOTAL  July 2003    TONSILLECTOMY      as a child    TYMPANOSTOMY TUBE PLACEMENT  1976    VEIN SURGERY  2003    Lt leg       Review of patient's allergies indicates:   Allergen Reactions    Morphine Other (See Comments)     Patient had a psychotic episode after taking Morphine  Agitation, hallucinations    Penicillins Anaphylaxis     Tolerated cephalosporins in the past    Januvia [sitagliptin] Hives    Carbamazepine Other (See Comments)     hyponatremia       No current facility-administered medications on file prior to encounter.     Current Outpatient Medications on File Prior to Encounter   Medication Sig    acetaminophen (TYLENOL) 500 MG tablet Take 2 tablets (1,000 mg total) by mouth every 6 (six) hours as needed for Pain.    albuterol (PROVENTIL/VENTOLIN HFA) 90 mcg/actuation inhaler INHALE 2 PUFFS INTO THE LUNGS EVERY 6 HOURS AS NEEDED FOR WHEEZING. RESCUE    apixaban (ELIQUIS) 5 mg " Tab Take 1 tablet (5 mg total) by mouth 2 (two) times daily.    aspirin 81 MG Chew Take 1 tablet (81 mg total) by mouth once daily.    bumetanide (BUMEX) 1 MG tablet Take 1 tablet (1 mg total) by mouth once daily.    divalproex (DEPAKOTE) 500 MG TbEC Take 1 tablet (500 mg total) by mouth once daily. PO QAM    ferrous sulfate 325 (65 FE) MG EC tablet Take 1 tablet (325 mg total) by mouth once daily.    hydrOXYzine (ATARAX) 50 MG tablet Take 0.5 tablets (25 mg total) by mouth 4 (four) times daily as needed for Itching or Anxiety.    lisinopriL 10 MG tablet Take 1 tablet (10 mg total) by mouth once daily.    loratadine (CLARITIN) 10 mg tablet Take 1 tablet (10 mg total) by mouth once daily.    metFORMIN (GLUCOPHAGE) 1000 MG tablet Take 1 tablet (1,000 mg total) by mouth 2 (two) times daily with meals.    metoprolol tartrate (LOPRESSOR) 25 MG tablet Take 1 tablet (25 mg total) by mouth 2 (two) times daily.    multivitamin Tab Take 1 tablet by mouth once daily.    pantoprazole (PROTONIX) 40 MG tablet Take 1 tablet (40 mg total) by mouth once daily.    pravastatin (PRAVACHOL) 40 MG tablet Take 1 tablet (40 mg total) by mouth every evening.    risperiDONE (RISPERDAL M-TABS) 3 MG disintegrating tablet Take 1 tablet (3 mg total) by mouth 2 (two) times daily. (Patient taking differently: Take 3 mg by mouth nightly.)    VYVANSE 40 mg Cap Take 40 mg by mouth once daily.    albuterol-ipratropium (DUO-NEB) 2.5 mg-0.5 mg/3 mL nebulizer solution Take 3 mLs by nebulization every 6 (six) hours as needed for Wheezing or Shortness of Breath. Rescue    ammonium lactate (LAC-HYDRIN) 12 % lotion APPL Y ONCE TOPICALLY TWICE DAILY FOR 30 DAYS    DUPIXENT  mg/2 mL PnIj Inject into the skin.    fluticasone propionate (FLONASE) 50 mcg/actuation nasal spray 2 sprays (100 mcg total) by Each Nostril route daily as needed (Nasal congestion).    fluticasone-salmeterol diskus inhaler 250-50 mcg Inhale 1 puff into the lungs 2 (two) times  daily. Controller    insulin detemir U-100 (LEVEMIR FLEXTOUCH) 100 unit/mL (3 mL) SubQ InPn pen Inject 22 Units into the skin every evening.    LIDOcaine (LIDODERM) 5 % Place 1 patch onto the skin once daily. Remove & Discard patch within 12 hours or as directed by MD    magnesium hydroxide 400 mg/5 ml (MILK OF MAGNESIA) 400 mg/5 mL Susp Take 30 mLs (2,400 mg total) by mouth daily as needed.    methocarbamoL (ROBAXIN) 500 MG Tab Take 500 mg by mouth. Frequency could not be confirmed.    nystatin (NYSTOP) powder APPLY TO ABDOMINAL AND BREAST SKIN FOLD TWICE DAILY.    oxyCODONE-acetaminophen (PERCOCET)  mg per tablet Take 1 tablet by mouth every 6 (six) hours as needed for Pain.    polyethylene glycol (GLYCOLAX) 17 gram PwPk Take 17 g by mouth once daily.    senna-docusate 8.6-50 mg (PERICOLACE) 8.6-50 mg per tablet Take 1 tablet by mouth 2 (two) times daily.    [DISCONTINUED] diclofenac sodium (VOLTAREN) 1 % Gel Apply 2 g topically 4 (four) times daily as needed (Apply to painful area up to 4 times a day as needed for pain). Apply to painful area 4 times a day as needed for pain    [DISCONTINUED] furosemide (LASIX) 20 MG tablet TAKE 1 TABLET(20 MG) BY MOUTH EVERY DAY    [DISCONTINUED] QUEtiapine (SEROQUEL) 200 MG Tab Take 1 tablet (200 mg total) by mouth before breakfast.     Family History       Problem Relation (Age of Onset)    Cataracts Father    Diabetes Father, Paternal Grandfather    Heart disease Father, Paternal Grandfather    Hypertension Father    No Known Problems Mother, Sister, Brother, Maternal Aunt, Maternal Uncle, Paternal Aunt, Paternal Uncle, Maternal Grandfather    Ovarian cancer Maternal Grandmother, Paternal Grandmother          Tobacco Use    Smoking status: Every Day     Packs/day: 1.00     Years: 37.00     Pack years: 37.00     Types: Cigarettes     Last attempt to quit: 2020     Years since quittin.2    Smokeless tobacco: Current    Tobacco comments:     Enrolled in the [place]  [facility] on 5/3/14 ([employer] Member ID # [id number]). Ambulatory referral to Smoking Cessation Program   Substance and Sexual Activity    Alcohol use: No     Alcohol/week: 0.0 standard drinks    Drug use: No    Sexual activity: Yes     Partners: Male     Review of Systems   Constitutional:  Negative for chills, fatigue and fever.   HENT:  Negative for congestion and rhinorrhea.    Eyes:  Negative for photophobia and visual disturbance.   Respiratory:  Negative for cough and shortness of breath.    Cardiovascular:  Positive for leg swelling. Negative for chest pain and palpitations.   Gastrointestinal:  Negative for abdominal pain, diarrhea, nausea and vomiting.   Genitourinary:  Negative for dysuria, frequency and urgency.   Musculoskeletal:  Positive for arthralgias and myalgias.   Skin:  Positive for color change and wound. Negative for pallor and rash.   Neurological:  Negative for light-headedness and headaches.   Psychiatric/Behavioral:  Negative for confusion and decreased concentration.    Objective:     Vital Signs (Most Recent):  Temp: 98.1 °F (36.7 °C) (02/28/23 1129)  Pulse: 63 (02/28/23 1129)  Resp: 18 (02/28/23 1129)  BP: 133/85 (02/28/23 1129)  SpO2: 97 % (02/28/23 1129)   Vital Signs (24h Range):  Temp:  [97.6 °F (36.4 °C)-98.7 °F (37.1 °C)] 98.1 °F (36.7 °C)  Pulse:  [63-82] 63  Resp:  [16-19] 18  SpO2:  [96 %-99 %] 97 %  BP: (123-169)/(60-85) 133/85     Weight: 104 kg (229 lb 4.5 oz)  Body mass index is 37.01 kg/m².    Physical Exam  Vitals and nursing note reviewed.   Constitutional:       General: She is not in acute distress.     Appearance: She is well-developed.   HENT:      Head: Normocephalic and atraumatic.      Right Ear: External ear normal.      Left Ear: External ear normal.      Nose: Nose normal.   Eyes:      Conjunctiva/sclera: Conjunctivae normal.      Pupils: Pupils are equal, round, and reactive to light.   Cardiovascular:      Rate and Rhythm: Normal rate and regular rhythm.    Pulmonary:      Effort: Pulmonary effort is normal. No respiratory distress.      Breath sounds: Normal breath sounds. No wheezing.   Abdominal:      General: Bowel sounds are normal. There is no distension.      Palpations: Abdomen is soft.      Tenderness: There is no abdominal tenderness.      Comments: No palpable hepatomegaly or splenomegaly    Musculoskeletal:         General: No tenderness. Normal range of motion.      Cervical back: Normal range of motion and neck supple.      Comments: Dressing c/d/i   Skin:     General: Skin is warm and dry.   Neurological:      Mental Status: She is alert and oriented to person, place, and time.   Psychiatric:         Thought Content: Thought content normal.         CRANIAL NERVES     CN III, IV, VI   Pupils are equal, round, and reactive to light.     Significant Labs: All pertinent labs within the past 24 hours have been reviewed.    Significant Imaging: I have reviewed all pertinent imaging results/findings within the past 24 hours.

## 2023-02-28 NOTE — PROGRESS NOTES
"[facility]  Podiatry  Consult Note    Patient Name: Audrey Natarajan  MRN: [id number]  Admission Date: 2/23/2023  Hospital Length of Stay: 3 days  Attending Physician: Jarocho Weldon MD  Primary Care Provider: Donaldo Pena MD     Consults  Subjective:     History of Present Illness: 51 y/o female PMH DM2, Charcot left foot admitted for left foot infection. Patient recently admitted for left foot infection in Dec. 2022 and Jan 2023. Patient discharged to LTAC for IV abx, wound care completed stay at LTAC on 2/7/23. Recently saw ID on 2/8/23 then 2/22/2(note pending completion). On ID note from 2/8/23 recommendation was to "continue to monitor off antibiotics". Patient also had HH for dressing change. Patient reports increased pain LLE in the last day prompting her to report to ED.     2/27/23: Patient seen bedside. Bandage intact left foot.     Scheduled Meds:   ceFEPime (MAXIPIME) IVPB  2 g Intravenous Q8H    divalproex  500 mg Oral Daily    enoxaparin  40 mg Subcutaneous Daily    insulin detemir U-100  15 Units Subcutaneous QHS    lisinopriL  10 mg Oral Daily    metoprolol tartrate  25 mg Oral BID    miconazole  1 applicator Vaginal QHS    polyethylene glycol  17 g Oral Daily    pravastatin  40 mg Oral QHS    risperidone 1 mg/ml  3 mg Oral QHS     Continuous Infusions:  PRN Meds:acetaminophen, aluminum-magnesium hydroxide-simethicone, cadexomer iodine, dextrose 10%, dextrose 10%, dextrose, dextrose, glucagon (human recombinant), HYDROmorphone, insulin aspart U-100, melatonin, naloxone, ondansetron, prochlorperazine, simethicone, sodium chloride 0.9%    Review of patient's allergies indicates:   Allergen Reactions    Morphine Other (See Comments)     Patient had a psychotic episode after taking Morphine  Agitation, hallucinations    Penicillins Anaphylaxis     Tolerated cephalosporins in the past    Januvia [sitagliptin] Hives    Carbamazepine Other (See Comments)     hyponatremia        Past Medical " "History:   Diagnosis Date    ADHD (attention deficit hyperactivity disorder)     Arthritis     Asthma     Bipolar 1 disorder     Cataract     Cigarette smoker     COPD (chronic obstructive pulmonary disease)     Coronary artery disease     A fib    Depression     bipolar manic depresson    Diabetes mellitus     Diabetic foot ulcers     Diabetic neuropathy     DVT of lower extremity, bilateral 07/2013    bilateral LE DVT. Keystone filter placed.     Encounter for blood transfusion     History of blood clots 1. Left Leg=2003; 2.Bilateral Groin=Blood Clots= 5 or 6/ 2013 & 7/2013; 3. LLL of Lung=7/2013;  4. Lt. Lower Leg=7/2013.     Pt. had 1st Blood Clot after Cajbrsxbbunz=6752, & Last=2013. Keystone Filter= Rt.Lateral Neck.    HTN (hypertension) 06/06/2013    Pt states that she does not have hypertension    Hypercholesteremia     Irregular heartbeat     Neuromuscular disorder     neuropathy feet    Obese     PE (pulmonary embolism) 07/2013    bilat LE DVT.     Restless leg syndrome      Past Surgical History:   Procedure Laterality Date    ABDOMINAL SURGERY  2010    gastric sleeve    BILATERAL OOPHORECTOMY Bilateral 1/12/2015    CHOLECYSTECTOMY      DEBRIDEMENT OF FOOT Bilateral 5/10/2022    Procedure: DEBRIDEMENT, FOOT;  Surgeon: Maira De Los Santos DPM;  Location: [facility] OR;  Service: Podiatry;  Laterality: Bilateral;    Green' s filter Right 7/4/2012    Right Neck & Tunneled Down.    HERNIA REPAIR      "[place] [facility] Hernias [employer] around th [place].", pt. states    INCISION AND DRAINAGE FOOT Left 12/24/2022    Procedure: INCISION AND DRAINAGE, FOOT;  Surgeon: Fahad Razo DPM;  Location: [facility] OR;  Service: Podiatry;  Laterality: Left;    LAPAROSCOPIC CHOLECYSTECTOMY N/A 9/10/2020    Procedure: CHOLECYSTECTOMY, LAPAROSCOPIC;  Surgeon: Montrell Gutierrez MD;  Location: [facility] OR;  Service: General;  Laterality: N/A;  RN PREOP 9/9----COVID Negative  9/9    OVARIAN CYST REMOVAL  3/13/2014    [place] REMOVAL OF OVARY/TUBE(S)   "    SPLENECTOMY, TOTAL  2003    TONSILLECTOMY      as a child    TYMPANOSTOMY TUBE PLACEMENT      VEIN SURGERY      Lt leg       Family History       Problem Relation (Age of Onset)    Cataracts Father    Diabetes Father, Paternal Grandfather    Heart disease Father, Paternal Grandfather    Hypertension Father    No Known Problems Mother, Sister, Brother, Maternal Aunt, Maternal Uncle, Paternal Aunt, Paternal Uncle, Maternal Grandfather    Ovarian cancer Maternal Grandmother, Paternal Grandmother          Tobacco Use    Smoking status: Every Day     Packs/day: 1.00     Years: 37.00     Pack years: 37.00     Types: Cigarettes     Last attempt to quit: 2020     Years since quittin.2    Smokeless tobacco: Current    Tobacco comments:     Enrolled in the [employer] on 5/3/14 ([facility] [id number]). Ambulatory referral to Smoking Cessation Program   Substance and Sexual Activity    Alcohol use: No     Alcohol/week: 0.0 standard drinks    Drug use: No    Sexual activity: Yes     Partners: Male     Review of Systems   Genitourinary: Negative.    Musculoskeletal:  Positive for arthralgias.   Skin:  Positive for wound.   Psychiatric/Behavioral: Negative.     Objective:     Vital Signs (Most Recent):  Temp: 97.7 °F (36.5 °C) (23)  Pulse: 67 (23)  Resp: 17 (23)  BP: (!) 156/68 (23)  SpO2: 97 % (23)   Vital Signs (24h Range):  Temp:  [97.6 °F (36.4 °C)-98.7 °F (37.1 °C)] 97.7 °F (36.5 °C)  Pulse:  [64-76] 67  Resp:  [16-19] 17  SpO2:  [94 %-99 %] 97 %  BP: (117-169)/(55-73) 156/68     Weight: 104 kg (229 lb 4.5 oz)  Body mass index is 37.01 kg/m².    Foot Exam    General  Orientation: alert and oriented to person, place, and time       Right Foot/Ankle     Neurovascular  Dorsalis pedis: 1+  Posterior tibial: 1+      Left Foot/Ankle      Inspection and Palpation  Skin Exam: ulcer;     Neurovascular  Dorsalis pedis: 1+  Posterior tibial:  1+      2/27/23:        2/24/23:  Wound 1: left plantar foot ulceration.   Base: fibrogranular wound base   Periwound skin: HPK  Drainage: serous   Probe: probe to bone.       Pre debridement       Post debridement         Laboratory:  CBC:   Recent Labs   Lab 02/28/23 0424   WBC 11.30   RBC 4.65   HGB 10.9*   HCT 35.7*   *   MCV 77*   MCH 23.4*   MCHC 30.5*       CMP:   Recent Labs   Lab 02/28/23 0424   *   CALCIUM 9.5   ALBUMIN 3.2*   PROT 6.7      K 4.3   CO2 30*      BUN 9   CREATININE 0.6   ALKPHOS 92   ALT 36   AST 50*   BILITOT 0.4         Diagnostic Results:  Xray: X-Ray Foot Complete Left  Order: [id number]  Status: Final result     Visible to patient: Yes (not seen)     Next appt: 03/06/2023 at 09:00 AM in Cardiology (VASCULAR ULTRASOUND, [facility])     Dx: Diabetic ulcer of left foot     0 Result Notes  Details    Reading Physician Reading Date Result Priority   Tu Santos Jr., MD  [phone]  [phone] 2/23/2023 STAT     Narrative & Impression  EXAMINATION:  XR FOOT COMPLETE 3 VIEW LEFT     CLINICAL HISTORY:  Type 2 diabetes mellitus with foot ulcer     TECHNIQUE:  XR FOOT COMPLETE 3 VIEW LEFT     COMPARISON:  01/09/2023     FINDINGS:  Large area of soft tissue ulceration is seen involving the plantar aspect of the midfoot with Charcot neuropathic changes seen throughout the midfoot.  No gross bone erosion, destruction, or aggressive periosteal reaction.  Nonspecific soft tissue swelling of the foot.     Impression:     No radiographic evidence of osteomyelitis        MRI: MRI Foot (Midfoot) Left W W/O Contrast  Order: [id number]  Status: Final result     Visible to patient: Yes (not seen)     Next appt: 03/06/2023 at 09:00 AM in Cardiology (VASCULAR ULTRASOUND, [facility])     Dx: Other acute osteomyelitis, unspecifie...     0 Result Notes  Details    Reading Physician Reading Date Result Priority   Ry Beltran MD  [phone]  [phone] 2/24/2023  Routine     Narrative & Impression  EXAMINATION:  MRI FOOT (MIDFOOT) LEFT W W/O CONTRAST     CLINICAL HISTORY:  Osteomyelitis, foot;ulceration left plantar foot r/o OM, abscess;  Other acute osteomyelitis, unspecified ankle and foot     TECHNIQUE:  Multiplanar, multisequence images were obtained through the left midfoot with and without intravenous contrast.     COMPARISON:  Left foot radiograph dated 02/22/2023; MRI left foot with and without contrast dated 01/10/2023     FINDINGS:  Plantar soft tissue ulceration of the midfoot again demonstrated spanning approximately 2.8 cm AP.  Subjacent locoregional induration with generalized T2 heterogeneity and T1 hypointense soft tissue signal.  Somewhat irregular linear nonenhancing tract extending superiorly from the site of ulceration to the level of the inferior cuboid (series 10, image 34).  Corresponding STIR edema and enhancement at the inferior cuboid at this level without discrete T1 hypointensity.     Redemonstration of advanced underlying Charcot changes at the midfoot and metatarsal bases with fragmentation and cortical irregularity.  No other new osseous T1 marrow hypointense signal.  Degenerative appearing cyst-like change at the calcaneus and subtalar level, similar.  Nonspecific STIR hyperintensity with atrophic change of intrinsic foot musculature.     Impression:     Redemonstration of plantar ulceration at the midfoot with corresponding soft tissue abnormality. No discrete drainable collection.  Similar appearance of extensive underlying Charcot changes at the midfoot and metatarsal bases with concern for osteomyelitis at the inferior cuboid.           Clinical Findings:  Left plantar foot ulceration with probe to bone.     Assessment/Plan:     Active Diagnoses:    Diagnosis Date Noted POA    PRINCIPAL PROBLEM:  Osteomyelitis of left foot [M86.9] 02/10/2021 Yes    Charcot's joint of foot [M14.679]  Yes    SHAWN (obstructive sleep apnea) [G47.33] 02/09/2022  Yes    Type 2 diabetes mellitus [E11.9] 09/26/2016 Yes     Chronic    Bipolar 1 disorder [F31.9] 04/13/2015 Yes     Chronic    Tobacco abuse [Z72.0] 03/06/2014 Yes     Chronic    Hyperlipidemia [E78.5] 02/13/2014 Yes     Chronic    COPD (chronic obstructive pulmonary disease) [J44.9] 10/11/2013 Yes     Chronic    Essential hypertension [I10] 06/06/2013 Yes     Chronic      Problems Resolved During this Admission:       MRI  revealing OM left cuboid.     Discussed two options with patient. LLE proximal amputation BKA  vs  IV abx for six weeks with aggressive wound care for several months with no guarantee of wound resolution. Patient declines BKA would like to try wound care.     Plan for OR debridement, bone biopsy, VAC application, possible neoxx application on 2/28/23. Case request placed     NPO @ midnight.     Nursing orders placed for daily dressing change.         Thank you for your consult. I will follow-up with patient. Please contact us if you have any additional questions.    Halle Gill DPM  Podiatry  [facility] [place] - Med Surg

## 2023-02-28 NOTE — PROGRESS NOTES
Pharmacokinetic Initial Assessment: IV Vancomycin    Assessment/Plan:    Initiate intravenous vancomycin maintenance dose of vancomycin 1750mg IV every 12 hours  Desired empiric serum trough concentration is 10 to 20 mcg/mL  Draw vancomycin trough level 60 min prior to fourth dose on 3/2/23 at approximately 0300  Pharmacy will continue to follow and monitor vancomycin.      Please contact pharmacy at extension [phone] with any questions regarding this assessment.     Thank you for the consult,   Augustina Arredondo       Patient brief summary:  Audrey Natarajan is a 52 y.o. female initiated on antimicrobial therapy with IV Vancomycin for treatment of suspected bone/joint infection    Drug Allergies:   Review of patient's allergies indicates:   Allergen Reactions    Morphine Other (See Comments)     Patient had a psychotic episode after taking Morphine  Agitation, hallucinations    Penicillins Anaphylaxis     Tolerated cephalosporins in the past    Januvia [sitagliptin] Hives    Carbamazepine Other (See Comments)     hyponatremia       Actual Body Weight:   104 kg     Renal Function:   Estimated Creatinine Clearance: 136.7 mL/min (based on SCr of 0.6 mg/dL).,     Dialysis Method (if applicable):  N/A    CBC (last 72 hours):  Recent Labs   Lab Result Units 02/26/23 0433 02/27/23 0453 02/28/23  0424   WBC K/uL 12.09 12.37 11.30   Hemoglobin g/dL 9.9* 10.6* 10.9*   Hematocrit % 33.6* 35.2* 35.7*   Platelets K/uL 698* 489* 707*   Gran % % 38.5 38.2 38.8   Lymph % % 40.4 43.8 42.0   Mono % % 13.8 11.6 12.1   Eosinophil % % 5.9 5.2 5.4   Basophil % % 0.9 0.9 1.3   Differential Method  Automated Automated Automated       Metabolic Panel (last 72 hours):  Recent Labs   Lab Result Units 02/26/23 0433 02/27/23 0453 02/28/23  0424   Sodium mmol/L 140 140 142   Potassium mmol/L 4.7 4.7 4.3   Chloride mmol/L 102 102 102   CO2 mmol/L 26 24 30*   Glucose mg/dL 208* 168* 153*   BUN mg/dL 12 8 9   Creatinine mg/dL 0.7 0.6 0.6    Albumin g/dL 3.1* 3.1* 3.2*   Total Bilirubin mg/dL 0.3 0.3 0.4   Alkaline Phosphatase U/L 72 75 92   AST U/L 12 29 50*   ALT U/L 13 10 36   Magnesium mg/dL  --  1.5* 1.6   Phosphorus mg/dL  --  4.2 4.7*       Drug levels (last 3 results):  Recent Labs   Lab Result Units 02/28/23  0424   Vancomycin, Random ug/mL 2.4       Microbiologic Results:  Microbiology Results (last 7 days)       Procedure Component Value Units Date/Time    Aerobic culture [[id number]] Collected: 02/28/23 1401    Order Status: Sent Specimen: Biopsy from Foot, Left Updated: 02/28/23 1632    Gram stain [[id number]] Collected: 02/28/23 1401    Order Status: Sent Specimen: Biopsy from Foot, Left Updated: 02/28/23 1432    Culture, Anaerobe [[id number]] Collected: 02/28/23 1401    Order Status: Sent Specimen: Biopsy from Foot, Left Updated: 02/28/23 1432    AFB Culture & Smear [[id number]] Collected: 02/28/23 1401    Order Status: Sent Specimen: Biopsy from Foot, Left Updated: 02/28/23 1432    Fungus culture [[id number]] Collected: 02/28/23 1401    Order Status: Sent Specimen: Biopsy from Foot, Left Updated: 02/28/23 1432    Culture, Anaerobe [[id number]] Collected: 02/24/23 0936    Order Status: Completed Specimen: Wound from Foot, Left Updated: 02/28/23 0838     Anaerobic Culture No anaerobes isolated    Blood Culture #2 **CANNOT BE ORDERED STAT** [[id number]] Collected: 02/23/23 1558    Order Status: Completed Specimen: Blood from Peripheral, Antecubital, Right Updated: 02/27/23 1703     Blood Culture, Routine No Growth after 4 days.    Blood Culture #1 **CANNOT BE ORDERED STAT** [[id number]] Collected: 02/23/23 1614    Order Status: Completed Specimen: Blood from Peripheral, Antecubital, Left Updated: 02/27/23 1703     Blood Culture, Routine No Growth after 4 days.    Aerobic culture [[id number]]  (Abnormal)  (Susceptibility) Collected: 02/24/23 0936    Order Status: Completed Specimen: Wound from Foot, Left Updated: 02/26/23 0603     Aerobic Bacterial  Culture PSEUDOMONAS AERUGINOSA  Rare      Gram stain [[id number]] Collected: 02/24/23 0936    Order Status: Completed Specimen: Wound from Foot, Left Updated: 02/24/23 1226     Gram Stain Result No WBC's      No organisms seen

## 2023-02-28 NOTE — ASSESSMENT & PLAN NOTE
BP is controlled  Continue home lisinopril and metoprolol  Bumex held- may need to resume post operatively

## 2023-02-28 NOTE — SUBJECTIVE & OBJECTIVE
Interval History: Seen on way to OR. Understands plan    Review of Systems   Constitutional:  Negative for fever.   Respiratory:  Negative for shortness of breath.    Cardiovascular:  Negative for chest pain.   Skin:  Positive for wound.   Psychiatric/Behavioral:  Negative for confusion.    Objective:     Vital Signs (Most Recent):  Temp: 98.1 °F (36.7 °C) (02/28/23 1129)  Pulse: 63 (02/28/23 1129)  Resp: 18 (02/28/23 1129)  BP: 133/85 (02/28/23 1129)  SpO2: 97 % (02/28/23 1129) Vital Signs (24h Range):  Temp:  [97.6 °F (36.4 °C)-98.7 °F (37.1 °C)] 98.1 °F (36.7 °C)  Pulse:  [63-82] 63  Resp:  [16-19] 18  SpO2:  [96 %-99 %] 97 %  BP: (123-169)/(60-85) 133/85     Weight: 104 kg (229 lb 4.5 oz)  Body mass index is 37.01 kg/m².    Intake/Output Summary (Last 24 hours) at 2/28/2023 1400  Last data filed at 2/28/2023 1258  Gross per 24 hour   Intake 449.17 ml   Output 1200 ml   Net -750.83 ml      Physical Exam  Vitals and nursing note reviewed.   Constitutional:       Appearance: She is obese.   HENT:      Head: Normocephalic and atraumatic.      Nose: Nose normal.      Mouth/Throat:      Mouth: Mucous membranes are moist.   Cardiovascular:      Rate and Rhythm: Normal rate and regular rhythm.   Pulmonary:      Effort: Pulmonary effort is normal.      Breath sounds: Normal breath sounds.      Comments: Room air  Neurological:      Mental Status: She is alert and oriented to person, place, and time. Mental status is at baseline.       Significant Labs: All pertinent labs within the past 24 hours have been reviewed.    Significant Imaging: I have reviewed all pertinent imaging results/findings within the past 24 hours.

## 2023-02-28 NOTE — PLAN OF CARE
Patient to Return to floor with orders to ice and elevate surgical limb for the next 24 hours.     Plan is to continue the antibiotics until further ID recommendations.    She will be nonweightbearing to the left foot and use walker or crutch assistance. She will elevate her left foot and rest the foot, keep it clean and dry. Podiatry will round on her Friday to change dressing. Dressings are to be left clean, dry, and intact until this time.

## 2023-02-28 NOTE — PROGRESS NOTES
"[facility] Medicine  Progress Note    Patient Name: Audrey Natarajan  MRN: [id number]  Patient Class: IP- Inpatient   Admission Date: 2/23/2023  Length of Stay: 3 days  Attending Physician: Keyona Darden MD  Primary Care Provider: Donaldo Pena MD        Subjective:     Principal Problem:Osteomyelitis of left foot        HPI:  52 y.o. female with hypertension, hyperlipidemia, COPD, bipolar 1 disorder, DM2 presents with a complaint of foot pain.  Appears to be a chronic intermittent problem for some time, associated with erythema and edema of the extremity.  Was on IV vancomycin for awhile.  Has home health wound care.  Sees Podiatry Dr. De Los Santos and Infectious Disease Dr. Jarocho panchal.  Denies fever, chills, cough, SOB, chest pain, palpitations, dizziness, syncope, nausea, vomiting, diarrhea, abdominal pain.  In the ED, she was found to have leukocytosis with elevated lactic acid.  Inflammatory markers are also elevated.  X-ray of the foot an ultrasound of the veins of the lower extremity were unremarkable for acute abnormality.  Blood cultures were obtained and she was started on IV vancomycin in the ED.  Placed in observation.      Overview/Hospital Course:  Audrey Natarajan was placed under observation for management of left plantar foot ulceration, and need for further evaluation by podiatry and infectious disease.Podiatry evaluated the patient and conducted a bedside debridement.  Wound cultures Pseudomonas.  Options were discussed with the patient by Podiatry which included amputation versus IV antibiotics for 6 weeks.  MRI midfoot obtained which shows: "Similar appearance of extensive underlying Charcot changes at the midfoot and metatarsal bases with concern for osteomyelitis at the inferior cuboid." Plan OR on 2/28, start vanc/cefepime afterwards. ID and Podiatry following.        Interval History: Seen on way to OR. Understands plan    Review of Systems   Constitutional:  Negative " for fever.   Respiratory:  Negative for shortness of breath.    Cardiovascular:  Negative for chest pain.   Skin:  Positive for wound.   Psychiatric/Behavioral:  Negative for confusion.    Objective:     Vital Signs (Most Recent):  Temp: 98.1 °F (36.7 °C) (02/28/23 1129)  Pulse: 63 (02/28/23 1129)  Resp: 18 (02/28/23 1129)  BP: 133/85 (02/28/23 1129)  SpO2: 97 % (02/28/23 1129) Vital Signs (24h Range):  Temp:  [97.6 °F (36.4 °C)-98.7 °F (37.1 °C)] 98.1 °F (36.7 °C)  Pulse:  [63-82] 63  Resp:  [16-19] 18  SpO2:  [96 %-99 %] 97 %  BP: (123-169)/(60-85) 133/85     Weight: 104 kg (229 lb 4.5 oz)  Body mass index is 37.01 kg/m².    Intake/Output Summary (Last 24 hours) at 2/28/2023 1400  Last data filed at 2/28/2023 1258  Gross per 24 hour   Intake 449.17 ml   Output 1200 ml   Net -750.83 ml      Physical Exam  Vitals and nursing note reviewed.   Constitutional:       Appearance: She is obese.   HENT:      Head: Normocephalic and atraumatic.      Nose: Nose normal.      Mouth/Throat:      Mouth: Mucous membranes are moist.   Cardiovascular:      Rate and Rhythm: Normal rate and regular rhythm.   Pulmonary:      Effort: Pulmonary effort is normal.      Breath sounds: Normal breath sounds.      Comments: Room air  Neurological:      Mental Status: She is alert and oriented to person, place, and time. Mental status is at baseline.       Significant Labs: All pertinent labs within the past 24 hours have been reviewed.    Significant Imaging: I have reviewed all pertinent imaging results/findings within the past 24 hours.      Assessment/Plan:      * Osteomyelitis of left foot  Erythema, edema, with worsening pain.     - wound culture Pseudomonas   - Podiatry and ID consulted.  - to OR 2/28  - ID consulted- start vanc and cefepime post operatively       Charcot's joint of foot  Noted risk factor for wounds      SHAWN (obstructive sleep apnea)  CPAP QHS    Type 2 diabetes mellitus  Patient's FSGs are controlled on current  medication regimen.  Last A1c reviewed-   Lab Results   Component Value Date    HGBA1C 7.1 (H) 12/22/2022     Most recent fingerstick glucose reviewed-   Recent Labs   Lab 02/27/23  1636 02/27/23  1944 02/28/23  0708 02/28/23  1127   POCTGLUCOSE 259* 276* 147* 144*     Current correctional scale  Medium  Maintain anti-hyperglycemic dose as follows-   Antihyperglycemics (From admission, onward)    Start     Stop Route Frequency Ordered    02/23/23 2330  insulin detemir U-100 pen 15 Units         -- SubQ Nightly 02/23/23 2229 02/23/23 2328  insulin aspart U-100 pen 1-10 Units         -- SubQ Before meals & nightly PRN 02/23/23 2229      Diabetic Diet  Hold Oral hypoglycemics while patient is in the hospital.    Class 2 severe obesity with serious comorbidity and body mass index (BMI) of 37.0 to 37.9 in adult  Body mass index is 37.01 kg/m². Morbid obesity complicates all aspects of disease management from diagnostic modalities to treatment. Weight loss encouraged and health benefits explained to patient.         Bipolar 1 disorder  Continue home regimen of risperidone    History of DVT (deep vein thrombosis)  Resume eliquis post operatively       Tobacco abuse  5 minutes spent counseling the patient on smoking cessation and she is not currently ready to stop smoking. She will be monitored for withdrawal.  She will be provided with additional smoking cessation counseling prior to discharge.    Hyperlipidemia  No recent lipid panel to review- repeat by PCP as outpatient  Continue statin    COPD (chronic obstructive pulmonary disease)  No signs of acute exacerbation, stable on room air  P.r.n. nebs    Essential hypertension  BP is controlled  Continue home lisinopril and metoprolol  Bumex held- may need to resume post operatively         VTE Risk Mitigation (From admission, onward)         Ordered     enoxaparin injection 40 mg  Daily         02/23/23 2229     IP VTE HIGH RISK PATIENT  Once         02/23/23 2229      Place sequential compression device  Until discontinued         02/23/23 2229                Discharge Planning   HUMBERTO:      Code Status: Full Code   Is the patient medically ready for discharge?:     Reason for patient still in hospital (select all that apply): Patient trending condition  Discharge Plan A: (P) Home Health   Discharge Delays: (!) (P) Post-Acute Set-up              Keyona Darden MD  Department of Hospital Medicine   [facility]

## 2023-02-28 NOTE — ANESTHESIA POSTPROCEDURE EVALUATION
Anesthesia Post Evaluation    Patient: Audrey Natarajan    Procedure(s) Performed: Procedure(s) (LRB):  DEBRIDEMENT, FOOT,biopsy (Left)    Final Anesthesia Type: MAC      Patient location during evaluation: PACU  Patient participation: Yes- Able to Participate  Level of consciousness: awake and alert  Post-procedure vital signs: reviewed and stable  Pain management: adequate  Airway patency: patent    PONV status at discharge: No PONV  Anesthetic complications: no      Cardiovascular status: blood pressure returned to baseline and hemodynamically stable  Respiratory status: unassisted and spontaneous ventilation  Hydration status: euvolemic  Follow-up not needed.          Vitals Value Taken Time   /74 02/28/23 1432   Temp 36.4 °C (97.5 °F) 02/28/23 1432   Pulse 62 02/28/23 1444   Resp 16 02/28/23 1444   SpO2 98 % 02/28/23 1444   Vitals shown include unvalidated device data.      No case tracking events are documented in the log.      Pain/Kristi Score: Pain Rating Prior to Med Admin: 8 (2/28/2023  9:13 AM)  Pain Rating Post Med Admin: 5 (2/28/2023  9:43 AM)  Rkisti Score: 9 (2/28/2023  2:27 PM)

## 2023-02-28 NOTE — TRANSFER OF CARE
"Anesthesia Transfer of Care Note    Patient: Audrey Natarajan    Procedure(s) Performed: Procedure(s) (LRB):  DEBRIDEMENT, FOOT,biopsy (Left)    Patient location: PACU    Anesthesia Type: MAC    Transport from OR: Transported from OR on room air with adequate spontaneous ventilation    Post pain: adequate analgesia    Post assessment: no apparent anesthetic complications and tolerated procedure well    Post vital signs: stable    Level of consciousness: awake    Nausea/Vomiting: no nausea/vomiting    Complications: none    Transfer of care protocol was followed      Last vitals:   Visit Vitals  BP (!) 159/74   Pulse 72   Temp 36.4 °C (97.5 °F)   Resp (!) 22   Ht 5' 6" (1.676 m)   Wt 104 kg (229 lb 4.5 oz)   LMP 11/01/2011 (LMP Unknown)   SpO2 95%   Breastfeeding No   BMI 37.01 kg/m²     "

## 2023-02-28 NOTE — OP NOTE
Surgical Irrigation and Debridement with Trocar Bone Biopsy    Surgery Date: 2/28/2023     Surgeon(s) and Role:     * Maira Sam DPM - Primary    Assisting Surgeon: None    Pre-op Diagnosis:  Diabetic ulcer of left midfoot associated with type 2 diabetes mellitus, with fat layer exposed [E11.621, L97.422]    Post-op Diagnosis:  Post-Op Diagnosis Codes:     * Diabetic ulcer of left midfoot associated with type 2 diabetes mellitus, with fat layer exposed [E11.621, L97.422]    Procedure(s) (LRB):  DEBRIDEMENT, FOOT,biopsy (Left)    Anesthesia: Local MAC    Description of the findings of the procedure: fibro granular wound with probe to bone and joint    Estimated Blood Loss: less than 15 mL         Specimens:   Specimen (24h ago, onward)       Start     Ordered    02/28/23 1401  Specimen to Pathology, Surgery Other (Podiatry)  Once        Comments: Pre-op Diagnosis: Diabetic ulcer of left midfoot associated with type 2 diabetes mellitus, with fat layer exposed [E11.621, L97.422]Procedure(s):DEBRIDEMENT, FOOT,biopsy Number of specimens: 1Name of specimens: Left Trocar Biopsy Cuboid     References:    Click here for ordering Quick Tip   Question Answer Comment   Procedure Type: Other Podiatry   Specimen Class: Routine/Screening    Which provider would you like to cc? MAIRA SAM    Release to patient Immediate        02/28/23 1403                    Hemostasis: Pneumatic ankle tourniquet , not inflated    Procedure in Detail:  The patient was seen in the Holding Room. The risks, benefits, complications, treatment options, and expected outcomes were discussed with the patient. The risks and potential complications of their problem and purposed treatment include but are not limited to infection, nerve injury, vascular injury, pain, potential skin necrosis, deep vein thrombosis, possible pulmonary embolus, complications of the anesthetics and need for further amputation.  The patient concurred with the proposed plan,  giving informed consent.  The site of surgery properly noted/marked. The patient was taken to Operating Room #1 identified as Audrey Natarajan and the procedure verified as irrigation and debridement of left foot with trocar bone biopsy and VAC placement. A Time Out was held and the above information confirmed.    The patient was brought to the operating room, placed on the operating table in a supine position. Following the successful induction of anesthesia, 8 ccs of a 1:1 mixture of 2% Lidocaine plain and 0.5% Bupivicaine plain was injected as an ankle block. The foot was then scrubbed, prepped, and draped in the usual aseptic manner.  The stockinette was then reflected and attention was directed to the plantar left midfoot where there was a full-thickness ulceration with tract which probed to bone and joint measuring 6.1x5.7x1.9cm pre debridement.      Wound Debridement    Date/Time: 2/28/2023 2:00 PM  Performed by: Maira De Los Santos DPM  Authorized by: Maira De Los Santos DPM       Wound Details:    Location:  Left foot    Location:  Left Plantar    Type of Debridement:  Excisional       Length (cm):  6.4       Area (sq cm):  38.4       Width (cm):  6       Percent Debrided (%):  100       Depth (cm):  3       Total Area Debrided (sq cm):  38.4    Depth of debridement:  Muscle/fascia/tendon    Tissue debrided:  Epidermis, Dermis, Subcutaneous, Muscle and Fascia    Devitalized tissue debrided:  Callus and Fibrin    Instruments:  Forceps, Nippers and Scissors ([email or web address])     Post debridement, A Jamshidi needle was utilized to take a plug of bone for culture and pathology.     Next,  2 L of sterile saline followed by 16 oz of vashe were instilled into the wound.    A 6x9 PuraPly sheet was then inspected and noted to be in acceptable.  It was then removed sterilely .  The sheet was then cut and sized and shaped to the wound. The sheet that was larger than the wound was used to pack the deficit first, I then utilized the  remaining entirety with overlapping edges against the wound.  The PuraPly was adhered down with saline soaked cotton swabs and then secured in place 3-0 prolene and covered with adaptic non-adherent layer followed by placement of VAC.     The patient tolerated the above anesthesia and surgery without apparent complications. A standard postoperative dressing was applied consisting of 4x4s, Kerlix,  Cast padding . The patient was transported via cart to Postanesthesia Care Unit with vital signs able and vascular status intact to foot. She will be readmitted to the floor where we will continue to follow . Plan is to continue the antibiotics until further ID recommendations.    She will be nonweightbearing to the left foot and use walker or crutch assistance. She will elevate her left foot and rest the foot, keep it clean and dry. Podiatry will round on her Friday to change dressing. Dressings are to be left clean, dry, and intact until this time.           Implants: none           Complications:  None; patient tolerated the procedure well.           Disposition: PACU - hemodynamically stable. Then back to floor           Condition: stable

## 2023-02-28 NOTE — PLAN OF CARE
Problem: Adult Inpatient Plan of Care  Goal: Plan of Care Review  Outcome: Ongoing, Progressing     Problem: Infection  Goal: Absence of Infection Signs and Symptoms  Outcome: Ongoing, Progressing     Problem: Fall Injury Risk  Goal: Absence of Fall and Fall-Related Injury  Outcome: Ongoing, Progressing     Problem: Diabetes Comorbidity  Goal: Blood Glucose Level Within Targeted Range  Outcome: Ongoing, Progressing     Pt free from falls or any further trauma through out the shift. Prescribed medication administered. Complaint of pain, prn medication given. Pt NPO . Pt in no distress. Will continue to monitor.

## 2023-02-28 NOTE — ASSESSMENT & PLAN NOTE
Body mass index is 37.01 kg/m². Morbid obesity complicates all aspects of disease management from diagnostic modalities to treatment. Weight loss encouraged and health benefits explained to patient.

## 2023-02-28 NOTE — ASSESSMENT & PLAN NOTE
"53y/o M with h/o DM with charcot foot, DVT on Eliquis, PAD, ongoing tobacco abuse admitted 2/23 with chronic L foot wound and worsening pain/swelling. Notably just completed 4 weeks vancomycin at LT[facility] for possible (note prior bone biopsy path/cultures 1/13 negative for OM). LLE Dvt studies negative. S/p podiatry debridement at bedside - gram stain negative, cultures rare growth of pseudomonas. Currently on vancomycin. ID consulted for "foot wound, leukocytosis, elevated lactic"    JEYSON with  hemodynamically significant stenosis in the left and DPA. Multifocal mild to moderate grade stenoses is suspected throughout the left MIRACLE and, bilateral posterior tibial and peroneal arteries.    Not clear that infection is playing role in chronic foot ulcer. 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 MRI evidence of osteo could be recently treated infection.  Likely poor wound healing from combination from  DM, ongoing tobacco abuse, PAD and psychosocial factors    Recommendations:   - agree with bone biopsy for path/culture; would not commit to another 6 weeks of abx without positive pathology/bone cultures  - hold antibiotics to increase yield of culture. Post biopsy, agree with vanc/cefepime (and de-escalation pending culture results)  - defer need for amputation to podiatry, but would likely be definitive treatment of infection  - counseled on need for tobacco cessation  - please ensure she has adequate perfusion to heal wounds       "

## 2023-02-28 NOTE — ASSESSMENT & PLAN NOTE
Erythema, edema, with worsening pain.     - wound culture Pseudomonas   - Podiatry and ID consulted.  - to OR 2/28  - ID consulted- start vanc and cefepime post operatively

## 2023-02-28 NOTE — PLAN OF CARE
Patient in OR today for debridement. SW called patient's sister, Anitra to discuss help at home and discharge planning. There was no answer. MELVI left a message. MELVI will try back later today.        02/28/23 1403   Discharge Reassessment   Assessment Type Discharge Planning Reassessment   Did the patient's condition or plan change since previous assessment? No   Communicated HUMBERTO with patient/caregiver Date not available/Unable to determine   Discharge Plan A Home Health   Discharge Plan B Home Health   DME Needed Upon Discharge  none   Discharge Barriers Identified None   Why the patient remains in the hospital Requires continued medical care   Post-Acute Status   Post-Acute Authorization Home Health   Home Health Status Pending medical clearance/testing   Coverage Medicaid   Discharge Delays (!) Post-Acute Set-up

## 2023-03-01 LAB
ALBUMIN SERPL BCP-MCNC: 2.9 G/DL (ref 3.5–5.2)
ALP SERPL-CCNC: 112 U/L (ref 55–135)
ALT SERPL W/O P-5'-P-CCNC: 70 U/L (ref 10–44)
ANION GAP SERPL CALC-SCNC: 9 MMOL/L (ref 8–16)
AST SERPL-CCNC: 64 U/L (ref 10–40)
BASOPHILS # BLD AUTO: 0.14 K/UL (ref 0–0.2)
BASOPHILS NFR BLD: 1.3 % (ref 0–1.9)
BILIRUB SERPL-MCNC: 0.2 MG/DL (ref 0.1–1)
BUN SERPL-MCNC: 11 MG/DL (ref 6–20)
CALCIUM SERPL-MCNC: 9.2 MG/DL (ref 8.7–10.5)
CHLORIDE SERPL-SCNC: 102 MMOL/L (ref 95–110)
CO2 SERPL-SCNC: 28 MMOL/L (ref 23–29)
CREAT SERPL-MCNC: 0.7 MG/DL (ref 0.5–1.4)
DIFFERENTIAL METHOD: ABNORMAL
EOSINOPHIL # BLD AUTO: 0.6 K/UL (ref 0–0.5)
EOSINOPHIL NFR BLD: 5 % (ref 0–8)
ERYTHROCYTE [DISTWIDTH] IN BLOOD BY AUTOMATED COUNT: 17.9 % (ref 11.5–14.5)
EST. GFR  (NO RACE VARIABLE): >60 ML/MIN/1.73 M^2
GLUCOSE SERPL-MCNC: 225 MG/DL (ref 70–110)
GRAM STN SPEC: NORMAL
GRAM STN SPEC: NORMAL
HCT VFR BLD AUTO: 33.7 % (ref 37–48.5)
HGB BLD-MCNC: 10.3 G/DL (ref 12–16)
IMM GRANULOCYTES # BLD AUTO: 0.04 K/UL (ref 0–0.04)
IMM GRANULOCYTES NFR BLD AUTO: 0.4 % (ref 0–0.5)
LYMPHOCYTES # BLD AUTO: 4 K/UL (ref 1–4.8)
LYMPHOCYTES NFR BLD: 36.2 % (ref 18–48)
MAGNESIUM SERPL-MCNC: 1.5 MG/DL (ref 1.6–2.6)
MCH RBC QN AUTO: 23.4 PG (ref 27–31)
MCHC RBC AUTO-ENTMCNC: 30.6 G/DL (ref 32–36)
MCV RBC AUTO: 76 FL (ref 82–98)
MONOCYTES # BLD AUTO: 1.2 K/UL (ref 0.3–1)
MONOCYTES NFR BLD: 10.6 % (ref 4–15)
NEUTROPHILS # BLD AUTO: 5.1 K/UL (ref 1.8–7.7)
NEUTROPHILS NFR BLD: 46.5 % (ref 38–73)
NRBC BLD-RTO: 0 /100 WBC
PHOSPHATE SERPL-MCNC: 4.3 MG/DL (ref 2.7–4.5)
PLATELET # BLD AUTO: 682 K/UL (ref 150–450)
PMV BLD AUTO: 10.4 FL (ref 9.2–12.9)
POCT GLUCOSE: 202 MG/DL (ref 70–110)
POCT GLUCOSE: 217 MG/DL (ref 70–110)
POCT GLUCOSE: 227 MG/DL (ref 70–110)
POCT GLUCOSE: 253 MG/DL (ref 70–110)
POTASSIUM SERPL-SCNC: 4.3 MMOL/L (ref 3.5–5.1)
PROT SERPL-MCNC: 6.2 G/DL (ref 6–8.4)
RBC # BLD AUTO: 4.41 M/UL (ref 4–5.4)
SODIUM SERPL-SCNC: 139 MMOL/L (ref 136–145)
WBC # BLD AUTO: 11 K/UL (ref 3.9–12.7)

## 2023-03-01 PROCEDURE — 97535 SELF CARE MNGMENT TRAINING: CPT

## 2023-03-01 PROCEDURE — 80053 COMPREHEN METABOLIC PANEL: CPT | Performed by: PODIATRIST

## 2023-03-01 PROCEDURE — 99232 SBSQ HOSP IP/OBS MODERATE 35: CPT | Mod: ,,, | Performed by: INTERNAL MEDICINE

## 2023-03-01 PROCEDURE — 63600175 PHARM REV CODE 636 W HCPCS: Performed by: PODIATRIST

## 2023-03-01 PROCEDURE — 99900035 HC TECH TIME PER 15 MIN (STAT)

## 2023-03-01 PROCEDURE — 84100 ASSAY OF PHOSPHORUS: CPT | Performed by: PODIATRIST

## 2023-03-01 PROCEDURE — 94761 N-INVAS EAR/PLS OXIMETRY MLT: CPT

## 2023-03-01 PROCEDURE — 63600175 PHARM REV CODE 636 W HCPCS: Mod: TB,JG | Performed by: PODIATRIST

## 2023-03-01 PROCEDURE — 63600175 PHARM REV CODE 636 W HCPCS: Performed by: HOSPITALIST

## 2023-03-01 PROCEDURE — 97165 OT EVAL LOW COMPLEX 30 MIN: CPT

## 2023-03-01 PROCEDURE — 25000003 PHARM REV CODE 250: Performed by: HOSPITALIST

## 2023-03-01 PROCEDURE — 99232 PR SUBSEQUENT HOSPITAL CARE,LEVL II: ICD-10-PCS | Mod: ,,, | Performed by: INTERNAL MEDICINE

## 2023-03-01 PROCEDURE — 63600175 PHARM REV CODE 636 W HCPCS: Performed by: NURSE PRACTITIONER

## 2023-03-01 PROCEDURE — 85025 COMPLETE CBC W/AUTO DIFF WBC: CPT | Performed by: PODIATRIST

## 2023-03-01 PROCEDURE — 97161 PT EVAL LOW COMPLEX 20 MIN: CPT

## 2023-03-01 PROCEDURE — 25000003 PHARM REV CODE 250: Performed by: NURSE PRACTITIONER

## 2023-03-01 PROCEDURE — 83735 ASSAY OF MAGNESIUM: CPT | Performed by: PODIATRIST

## 2023-03-01 PROCEDURE — 11000001 HC ACUTE MED/SURG PRIVATE ROOM

## 2023-03-01 PROCEDURE — 36415 COLL VENOUS BLD VENIPUNCTURE: CPT | Performed by: PODIATRIST

## 2023-03-01 PROCEDURE — 25000003 PHARM REV CODE 250: Performed by: PODIATRIST

## 2023-03-01 RX ORDER — HYDROMORPHONE HYDROCHLORIDE 1 MG/ML
0.5 INJECTION, SOLUTION INTRAMUSCULAR; INTRAVENOUS; SUBCUTANEOUS EVERY 4 HOURS PRN
Status: DISCONTINUED | OUTPATIENT
Start: 2023-03-01 | End: 2023-03-02

## 2023-03-01 RX ORDER — INSULIN ASPART 100 [IU]/ML
5 INJECTION, SOLUTION INTRAVENOUS; SUBCUTANEOUS
Status: DISCONTINUED | OUTPATIENT
Start: 2023-03-01 | End: 2023-03-03 | Stop reason: HOSPADM

## 2023-03-01 RX ORDER — MAGNESIUM SULFATE HEPTAHYDRATE 40 MG/ML
2 INJECTION, SOLUTION INTRAVENOUS ONCE
Status: COMPLETED | OUTPATIENT
Start: 2023-03-01 | End: 2023-03-01

## 2023-03-01 RX ORDER — AMOXICILLIN 250 MG
2 CAPSULE ORAL 2 TIMES DAILY
Status: DISCONTINUED | OUTPATIENT
Start: 2023-03-01 | End: 2023-03-03 | Stop reason: HOSPADM

## 2023-03-01 RX ADMIN — MAGNESIUM SULFATE HEPTAHYDRATE 2 G: 40 INJECTION, SOLUTION INTRAVENOUS at 09:03

## 2023-03-01 RX ADMIN — APIXABAN 5 MG: 5 TABLET, FILM COATED ORAL at 09:03

## 2023-03-01 RX ADMIN — CEFEPIME HYDROCHLORIDE 2 G: 2 INJECTION, SOLUTION INTRAVENOUS at 12:03

## 2023-03-01 RX ADMIN — DIVALPROEX SODIUM 500 MG: 250 TABLET, DELAYED RELEASE ORAL at 08:03

## 2023-03-01 RX ADMIN — CEFEPIME HYDROCHLORIDE 2 G: 2 INJECTION, SOLUTION INTRAVENOUS at 04:03

## 2023-03-01 RX ADMIN — LISINOPRIL 10 MG: 5 TABLET ORAL at 08:03

## 2023-03-01 RX ADMIN — SENNOSIDES AND DOCUSATE SODIUM 2 TABLET: 50; 8.6 TABLET ORAL at 09:03

## 2023-03-01 RX ADMIN — HYDROMORPHONE HYDROCHLORIDE 1 MG: 1 INJECTION, SOLUTION INTRAMUSCULAR; INTRAVENOUS; SUBCUTANEOUS at 07:03

## 2023-03-01 RX ADMIN — VANCOMYCIN HYDROCHLORIDE 1750 MG: 500 INJECTION, POWDER, LYOPHILIZED, FOR SOLUTION INTRAVENOUS at 03:03

## 2023-03-01 RX ADMIN — ONDANSETRON 4 MG: 2 INJECTION INTRAMUSCULAR; INTRAVENOUS at 03:03

## 2023-03-01 RX ADMIN — INSULIN ASPART 5 UNITS: 100 INJECTION, SOLUTION INTRAVENOUS; SUBCUTANEOUS at 05:03

## 2023-03-01 RX ADMIN — CEFEPIME HYDROCHLORIDE 2 G: 2 INJECTION, SOLUTION INTRAVENOUS at 09:03

## 2023-03-01 RX ADMIN — INSULIN ASPART 4 UNITS: 100 INJECTION, SOLUTION INTRAVENOUS; SUBCUTANEOUS at 05:03

## 2023-03-01 RX ADMIN — INSULIN ASPART 5 UNITS: 100 INJECTION, SOLUTION INTRAVENOUS; SUBCUTANEOUS at 12:03

## 2023-03-01 RX ADMIN — INSULIN ASPART 3 UNITS: 100 INJECTION, SOLUTION INTRAVENOUS; SUBCUTANEOUS at 09:03

## 2023-03-01 RX ADMIN — PRAVASTATIN SODIUM 40 MG: 40 TABLET ORAL at 09:03

## 2023-03-01 RX ADMIN — INSULIN ASPART 4 UNITS: 100 INJECTION, SOLUTION INTRAVENOUS; SUBCUTANEOUS at 12:03

## 2023-03-01 RX ADMIN — MICONAZOLE NITRATE 1 APPLICATOR: 20 CREAM VAGINAL at 09:03

## 2023-03-01 RX ADMIN — METOPROLOL TARTRATE 25 MG: 25 TABLET, FILM COATED ORAL at 08:03

## 2023-03-01 RX ADMIN — HYDROCODONE BITARTRATE AND ACETAMINOPHEN 1 TABLET: 5; 325 TABLET ORAL at 05:03

## 2023-03-01 RX ADMIN — HYDROCODONE BITARTRATE AND ACETAMINOPHEN 1 TABLET: 5; 325 TABLET ORAL at 03:03

## 2023-03-01 RX ADMIN — HYDROMORPHONE HYDROCHLORIDE 0.5 MG: 1 INJECTION, SOLUTION INTRAMUSCULAR; INTRAVENOUS; SUBCUTANEOUS at 09:03

## 2023-03-01 RX ADMIN — METOPROLOL TARTRATE 25 MG: 25 TABLET, FILM COATED ORAL at 09:03

## 2023-03-01 RX ADMIN — HYDROCODONE BITARTRATE AND ACETAMINOPHEN 1 TABLET: 5; 325 TABLET ORAL at 10:03

## 2023-03-01 RX ADMIN — INSULIN ASPART 5 UNITS: 100 INJECTION, SOLUTION INTRAVENOUS; SUBCUTANEOUS at 08:03

## 2023-03-01 RX ADMIN — RISPERIDONE 3 MG: 1 SOLUTION ORAL at 09:03

## 2023-03-01 RX ADMIN — INSULIN ASPART 4 UNITS: 100 INJECTION, SOLUTION INTRAVENOUS; SUBCUTANEOUS at 08:03

## 2023-03-01 RX ADMIN — HYDROMORPHONE HYDROCHLORIDE 1 MG: 1 INJECTION, SOLUTION INTRAMUSCULAR; INTRAVENOUS; SUBCUTANEOUS at 01:03

## 2023-03-01 RX ADMIN — VANCOMYCIN HYDROCHLORIDE 1750 MG: 500 INJECTION, POWDER, LYOPHILIZED, FOR SOLUTION INTRAVENOUS at 04:03

## 2023-03-01 NOTE — PLAN OF CARE
Problem: Skin Injury Risk Increased  Goal: Skin Health and Integrity  Outcome: Ongoing, Progressing     Problem: Adult Inpatient Plan of Care  Goal: Plan of Care Review  Outcome: Ongoing, Progressing  Goal: Patient-Specific Goal (Individualized)  Outcome: Ongoing, Progressing  Goal: Absence of Hospital-Acquired Illness or Injury  Outcome: Ongoing, Progressing  Goal: Optimal Comfort and Wellbeing  Outcome: Ongoing, Progressing  Goal: Readiness for Transition of Care  Outcome: Ongoing, Progressing     Problem: Infection  Goal: Absence of Infection Signs and Symptoms  Outcome: Ongoing, Progressing     Problem: Fall Injury Risk  Goal: Absence of Fall and Fall-Related Injury  Outcome: Ongoing, Progressing     Problem: Diabetes Comorbidity  Goal: Blood Glucose Level Within Targeted Range  Outcome: Ongoing, Progressing     Problem: Impaired Wound Healing  Goal: Optimal Wound Healing  Outcome: Ongoing, Progressing   Patient to receive her wound vac for home tomorrow

## 2023-03-01 NOTE — PLAN OF CARE
Problem: Physical Therapy  Goal: Physical Therapy Goal  Outcome: Adequate for Care Transition   Initial eval completed, pt safe to transfer with staff assistance, no skilled PT needed at this time.

## 2023-03-01 NOTE — ASSESSMENT & PLAN NOTE
Erythema, edema, with worsening pain.     - wound culture Pseudomonas   - Podiatry and ID consulted.  - to OR 2/28 for debridement. Culture pending  - ID consulted- started vanc and cefepime post operatively   - anticipate PICC and need for outpatient antibiotics/wound care  - PT, OT today- no weight bearing L foot

## 2023-03-01 NOTE — PLAN OF CARE
Problem: Skin Injury Risk Increased  Goal: Skin Health and Integrity  Intervention: Promote and Optimize Oral Intake  Flowsheets (Taken 3/1/2023 0322)  Oral Nutrition Promotion:   physical activity promoted   rest periods promoted     Problem: Fall Injury Risk  Goal: Absence of Fall and Fall-Related Injury  Intervention: Identify and Manage Contributors  Flowsheets (Taken 3/1/2023 0322)  Self-Care Promotion: independence encouraged  Medication Review/Management:   medications reviewed   high-risk medications identified  Intervention: Promote Injury-Free Environment  Flowsheets (Taken 3/1/2023 0322)  Safety Promotion/Fall Prevention:   assistive device/personal item within reach   bed alarm set   muscle strengthening facilitated   medications reviewed   side rails raised x 2     Problem: Diabetes Comorbidity  Goal: Blood Glucose Level Within Targeted Range  Intervention: Monitor and Manage Glycemia  Flowsheets (Taken 3/1/2023 0322)  Glycemic Management:   blood glucose monitored   supplemental insulin given

## 2023-03-01 NOTE — PLAN OF CARE
Problem: Occupational Therapy  Goal: Occupational Therapy Goal  Outcome: Met   OT eval is complete. The patient is at her functional baseline and no OT is recommended. Nursing should encourage the patient to sit in the chair during during the day.

## 2023-03-01 NOTE — SUBJECTIVE & OBJECTIVE
Interval History: Pain in foot, improved with pain Rx.     Review of Systems   Constitutional:  Negative for fever.   Respiratory:  Negative for shortness of breath.    Cardiovascular:  Negative for chest pain.   Skin:  Positive for wound.   Psychiatric/Behavioral:  Negative for confusion.    Objective:     Vital Signs (Most Recent):  Temp: 98 °F (36.7 °C) (03/01/23 0704)  Pulse: 87 (03/01/23 0704)  Resp: 20 (03/01/23 0721)  BP: (!) 146/68 (03/01/23 0704)  SpO2: (!) 93 % (03/01/23 0704) Vital Signs (24h Range):  Temp:  [97.5 °F (36.4 °C)-98.3 °F (36.8 °C)] 98 °F (36.7 °C)  Pulse:  [60-87] 87  Resp:  [16-22] 20  SpO2:  [93 %-98 %] 93 %  BP: (122-177)/(55-85) 146/68     Weight: 104 kg (229 lb 4.5 oz)  Body mass index is 37.01 kg/m².    Intake/Output Summary (Last 24 hours) at 3/1/2023 0803  Last data filed at 3/1/2023 0641  Gross per 24 hour   Intake 1710 ml   Output 850 ml   Net 860 ml        Physical Exam  Vitals and nursing note reviewed.   Constitutional:       Appearance: She is obese.   HENT:      Head: Normocephalic and atraumatic.      Nose: Nose normal.      Mouth/Throat:      Mouth: Mucous membranes are moist.   Cardiovascular:      Rate and Rhythm: Normal rate and regular rhythm.   Pulmonary:      Effort: Pulmonary effort is normal.      Breath sounds: Normal breath sounds.      Comments: Room air  Abdominal:      General: Bowel sounds are normal.      Palpations: Abdomen is soft.   Musculoskeletal:      Comments: L foot in surgical boot, not removed   Skin:     General: Skin is warm and dry.   Neurological:      Mental Status: She is alert and oriented to person, place, and time. Mental status is at baseline.       Significant Labs: All pertinent labs within the past 24 hours have been reviewed.    Significant Imaging: I have reviewed all pertinent imaging results/findings within the past 24 hours.

## 2023-03-01 NOTE — PROGRESS NOTES
Vancomycin consult follow-up:    Patient reviewed, renal function stable, no new levels, continue current therapy; Next levels due: trough due 3/2/2023 at 0300

## 2023-03-01 NOTE — ASSESSMENT & PLAN NOTE
"53y/o M with h/o DM with charcot foot, DVT on Eliquis, PAD, ongoing tobacco abuse admitted 2/23 with chronic L foot wound and worsening pain/swelling. Notably just completed 4 weeks vancomycin at [facility] for possible (note prior bone biopsy path/cultures 1/13 negative for OM). LLE Dvt studies negative. S/p podiatry debridement at bedside - gram stain negative, cultures rare growth of pseudomonas. Currently on vancomycin. ID consulted for "foot wound, leukocytosis, elevated lactic"    JEYSON with  hemodynamically significant stenosis in the left and DPA. Multifocal mild to moderate grade stenoses is suspected throughout the left MIRACLE and, bilateral posterior tibial and peroneal arteries.    Not clear that infection is playing role in chronic foot ulcer. 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 MRI evidence of osteo could be recently treated infection.  Likely poor wound healing from combination from  DM, ongoing tobacco abuse, PAD and psychosocial factors.    Awaiting results of bone biopsy path/cultureto clarify if OM present. would not commit to another 6 weeks of abx without positive pathology/bone cultures    Recommendations:   -  agree with vanc/cefepime (and de-escalation pending culture results)  - defer need for amputation to podiatry, but would likely be definitive treatment of infection  - counseled on need for tobacco cessation  - please ensure she has adequate perfusion to heal wounds       "

## 2023-03-01 NOTE — PROGRESS NOTES
"[facility] Medicine  Progress Note    Patient Name: Audrey Natarajan  MRN: [id number]  Patient Class: IP- Inpatient   Admission Date: 2/23/2023  Length of Stay: 4 days  Attending Physician: Keyona Darden MD  Primary Care Provider: Donaldo Pena MD        Subjective:     Principal Problem:Osteomyelitis of left foot        HPI:  52 y.o. female with hypertension, hyperlipidemia, COPD, bipolar 1 disorder, DM2 presents with a complaint of foot pain.  Appears to be a chronic intermittent problem for some time, associated with erythema and edema of the extremity.  Was on IV vancomycin for awhile.  Has home health wound care.  Sees Podiatry Dr. De Los Santos and Infectious Disease Dr. Jarocho panchal.  Denies fever, chills, cough, SOB, chest pain, palpitations, dizziness, syncope, nausea, vomiting, diarrhea, abdominal pain.  In the ED, she was found to have leukocytosis with elevated lactic acid.  Inflammatory markers are also elevated.  X-ray of the foot an ultrasound of the veins of the lower extremity were unremarkable for acute abnormality.  Blood cultures were obtained and she was started on IV vancomycin in the ED.  Placed in observation.      Overview/Hospital Course:  Audrey Natarajan was placed under observation for management of left plantar foot ulceration, and need for further evaluation by podiatry and infectious disease.Podiatry evaluated the patient and conducted a bedside debridement.  Wound cultures Pseudomonas.  Options were discussed with the patient by Podiatry which included amputation versus IV antibiotics for 6 weeks.  MRI midfoot obtained which shows: "Similar appearance of extensive underlying Charcot changes at the midfoot and metatarsal bases with concern for osteomyelitis at the inferior cuboid." To OR on 2/28 for debridement and bone biopsy. Results pending. Started vanc/cefepime afterwards. ID and Podiatry following.        Interval History: Pain in foot, improved with pain Rx. "     Review of Systems   Constitutional:  Negative for fever.   Respiratory:  Negative for shortness of breath.    Cardiovascular:  Negative for chest pain.   Skin:  Positive for wound.   Psychiatric/Behavioral:  Negative for confusion.    Objective:     Vital Signs (Most Recent):  Temp: 98 °F (36.7 °C) (03/01/23 0704)  Pulse: 87 (03/01/23 0704)  Resp: 20 (03/01/23 0721)  BP: (!) 146/68 (03/01/23 0704)  SpO2: (!) 93 % (03/01/23 0704) Vital Signs (24h Range):  Temp:  [97.5 °F (36.4 °C)-98.3 °F (36.8 °C)] 98 °F (36.7 °C)  Pulse:  [60-87] 87  Resp:  [16-22] 20  SpO2:  [93 %-98 %] 93 %  BP: (122-177)/(55-85) 146/68     Weight: 104 kg (229 lb 4.5 oz)  Body mass index is 37.01 kg/m².    Intake/Output Summary (Last 24 hours) at 3/1/2023 0803  Last data filed at 3/1/2023 0641  Gross per 24 hour   Intake 1710 ml   Output 850 ml   Net 860 ml        Physical Exam  Vitals and nursing note reviewed.   Constitutional:       Appearance: She is obese.   HENT:      Head: Normocephalic and atraumatic.      Nose: Nose normal.      Mouth/Throat:      Mouth: Mucous membranes are moist.   Cardiovascular:      Rate and Rhythm: Normal rate and regular rhythm.   Pulmonary:      Effort: Pulmonary effort is normal.      Breath sounds: Normal breath sounds.      Comments: Room air  Abdominal:      General: Bowel sounds are normal.      Palpations: Abdomen is soft.   Musculoskeletal:      Comments: L foot in surgical boot, not removed   Skin:     General: Skin is warm and dry.   Neurological:      Mental Status: She is alert and oriented to person, place, and time. Mental status is at baseline.       Significant Labs: All pertinent labs within the past 24 hours have been reviewed.    Significant Imaging: I have reviewed all pertinent imaging results/findings within the past 24 hours.      Assessment/Plan:      * Osteomyelitis of left foot  Erythema, edema, with worsening pain.     - wound culture Pseudomonas   - Podiatry and ID consulted.  - to  OR 2/28 for debridement. Culture pending  - ID consulted- started vanc and cefepime post operatively   - anticipate PICC and need for outpatient antibiotics/wound care  - PT, OT today- no weight bearing L foot       Charcot's joint of foot  Noted risk factor for wounds      SHAWN (obstructive sleep apnea)  CPAP QHS    Type 2 diabetes mellitus  Patient's FSGs are controlled on current medication regimen.  Last A1c reviewed-   Lab Results   Component Value Date    HGBA1C 7.1 (H) 12/22/2022     Most recent fingerstick glucose reviewed-   Recent Labs   Lab 02/28/23  1127 02/28/23  1443 02/28/23  1612 03/01/23  0703   POCTGLUCOSE 144* 124* 81 217*     Current correctional scale  Medium  Increase anti-hyperglycemic dose as follows-   Antihyperglycemics (From admission, onward)    Start     Stop Route Frequency Ordered    03/01/23 0745  insulin aspart U-100 pen 5 Units         -- SubQ 3 times daily with meals 03/01/23 0645    02/23/23 2330  insulin detemir U-100 pen 15 Units         -- SubQ Nightly 02/23/23 2229    02/23/23 2328  insulin aspart U-100 pen 1-10 Units         -- SubQ Before meals & nightly PRN 02/23/23 2229      Diabetic Diet  Hold Oral hypoglycemics while patient is in the hospital.    Class 2 severe obesity with serious comorbidity and body mass index (BMI) of 37.0 to 37.9 in adult  Body mass index is 37.01 kg/m². Morbid obesity complicates all aspects of disease management from diagnostic modalities to treatment. Weight loss encouraged and health benefits explained to patient.         Bipolar 1 disorder  Continue home regimen of risperidone    History of pulmonary embolus (PE)  History of PE/DVT- resumed eliquis      History of DVT (deep vein thrombosis)  Resumed eliquis    Tobacco abuse  5 minutes spent counseling the patient on smoking cessation and she is not currently ready to stop smoking. She will be monitored for withdrawal.  She will be provided with additional smoking cessation counseling prior to  discharge.    Hyperlipidemia  No recent lipid panel to review- repeat by PCP as outpatient  Continue statin    COPD (chronic obstructive pulmonary disease)  No signs of acute exacerbation, stable on room air  P.r.n. nebs    Essential hypertension  BP is controlled  Continue home lisinopril and metoprolol  Bumex held- may need to resume at some point but no edema currently       VTE Risk Mitigation (From admission, onward)         Ordered     apixaban tablet 5 mg  2 times daily         03/01/23 0804     IP VTE HIGH RISK PATIENT  Once         02/23/23 2229     Place sequential compression device  Until discontinued         02/23/23 2229                Discharge Planning   HUMBERTO:      Code Status: Full Code   Is the patient medically ready for discharge?:     Reason for patient still in hospital (select all that apply): Patient trending condition  Discharge Plan A: Home Health   Discharge Delays: (!) Post-Acute Set-up              Keyona Darden MD  Department of Hospital Medicine   [facility]

## 2023-03-01 NOTE — PLAN OF CARE
MELVI discussed discharge planning with patient. Patient stated that she has home health with [facility] and has notified them that she is in the hospital. Waiting on ID and Podiatry recs. Patient currently has wound vac. MELVI sent message to Dr. De Los Santos inquiring if patient will need wound vac upon discharge. Dr. De Los Santos messaged back that patient will need a wound vac and she will complete the paperwork for it.     12:56 pm     Dr. Gill notified MELVI that wound vac form was complete and in chart.     2:45 pm    MELVI notified Deyvi at [facility] that patient will need a wound vac. Deyvi instructed SW to email form and clinicals to her. MELVI emailed form and clinicals to Deyvi.     3:18 pm     Deyvi informed SW that she has submitted the [facility] form for benefit verification.     4:22 pm     Received call from Deyvi. Wound vac approved and will be delivered tomorrow by 1:00 p.m.

## 2023-03-01 NOTE — PT/OT/SLP EVAL
Occupational Therapy   Evaluation and Discharge Note    Name: Audrey Natarajan  MRN: [id number]  Admitting Diagnosis: Osteomyelitis of left foot  Recent Surgery: Procedure(s) (LRB):  DEBRIDEMENT, FOOT,biopsy (Left) 1 Day Post-Op    Recommendations:     Discharge Recommendations: home (with family assist)  Discharge Equipment Recommendations: bedside commode  Barriers to discharge:   (will require assist, lives alone)    Assessment:     Audrey Natarajan is a 52 y.o. female with a medical diagnosis of Osteomyelitis of left foot. At this time, patient is functioning at their prior level of function and does not require further acute OT services.    OT eval is complete. The patient is at her functional baseline and no OT is recommended. Nursing should encourage the patient to sit in the chair during during the day and amb in the hallway with family .        Plan:     During this hospitalization, patient does not require further acute OT services.  Please re-consult if situation changes.    Plan of Care Reviewed with: patient    Subjective     Chief Complaint: pain  Patient/Family Comments/goals: agreeable to sit in the chair    Occupational Profile:  Living Environment: lives alone in a [employer] trailer with a ramp to enter, handicapped bathroom with grab bars in the shower and around the toilet, flip down shower bench  Previous level of function: Mod I amb using a knee roller or W/C in the community, Mod I self care  Roles and Routines: receives assist for meals from friends and family, patient drives  Equipment Used at home: bath bench, wheelchair, walker, rolling  Assistance upon Discharge: friends and family    Pain/Comfort:  Pain Rating 1: 8/10  Location 1: leg (foot)  Pain Addressed 1: Pre-medicate for activity (pain meds received during OT)    Patients cultural, spiritual, Tenriism conflicts given the current situation: no    Objective:     Communicated with: nurseRadha prior to session.  Patient found HOB  elevated with telemetry, peripheral IV, wound vac upon OT entry to room.    General Precautions: Standard, fall, diabetic  Orthopedic Precautions: LLE non weight bearing  Braces: N/A (LLE cam boot)  Respiratory Status: Room air     Occupational Performance:    Bed Mobility:    Patient completed Scooting/Bridging with modified independence  Patient completed Supine to Sit with modified independence    Functional Mobility/Transfers:  Patient completed Sit <> Stand Transfer with modified independence and assist to manage wound vac line  with  no assistive device   Patient completed Toilet Transfer Stand Pivot technique with modified independence and supervision with  bedside commode  Functional Mobility: bed<>BSC, bed>chair with SBA and assist to manage wound vac lines    Activities of Daily Living:  Grooming: modified independence to brush teeth while seated in the chair  Upper Body Dressing: modified independence to don back gown  Lower Body Dressing: modified independence to manage underpants during toileting  Toileting: modified independence and set up assist while seated on the BSC    Cognitive/Visual Perceptual:  Cognitive/Psychosocial Skills:     -       Oriented to: Person, Place, Time, and Situation   -       Follows Commands/attention:Follows multistep  commands  -       Communication: clear/fluent  -       Memory: No Deficits noted  -       Safety awareness/insight to disability: intact   -       Mood/Affect/Coping skills/emotional control: Appropriate to situation  Visual/Perceptual:      -Intact      Physical Exam:  Balance: -       good  Postural examination/scapula alignment:    -       No postural abnormalities identified  Skin integrity: left foot incision covered by dressing and boot  Edema:  left foot  Sensation:    -       Impaired  reports tingling in B finger tips  Dominant hand: -       right  Upper Extremity Range of Motion:     -       Right Upper Extremity: WFL  -       Left Upper Extremity:  WFL  Upper Extremity Strength:    -       Right Upper Extremity: WFL  -       Left Upper Extremity: WFL   Strength:    -       Right Upper Extremity: WFL  -       Left Upper Extremity: WFL    AMPAC 6 Click ADL:  AMPAC Total Score: 8    Treatment & Education:  Participated in OT eval  Educated re: OT role and POC  Performed self care and functional mobility as noted above  Educated re: home safety and recommendations for a BSC for home use. Handout given for home safety.  Discussed DME needs and assist available at home  Educated the patient re: the need/benefit of sitting in the chair throughout the day    Patient left up in chair with all lines intact, call button in reach, and nurse notified    GOALS:   Multidisciplinary Problems       Occupational Therapy Goals       Not on file              Multidisciplinary Problems (Resolved)          Problem: Occupational Therapy    Goal Priority Disciplines Outcome Interventions   Occupational Therapy Goal   (Resolved)     OT, PT/OT Met                        History:     Past Medical History:   Diagnosis Date    ADHD (attention deficit hyperactivity disorder)     Arthritis     Asthma     Bipolar 1 disorder     Cataract     Cigarette smoker     COPD (chronic obstructive pulmonary disease)     Coronary artery disease     A fib    Depression     bipolar manic depresson    Diabetes mellitus     Diabetic foot ulcers     Diabetic neuropathy     DVT of lower extremity, bilateral 07/2013    bilateral LE DVT. Estelita filter placed.     Encounter for blood transfusion     History of blood clots 1. Left Leg=2003; 2.Bilateral Groin=Blood Clots= 5 or 6/ 2013 & 7/2013; 3. LLL of Lung=7/2013;  4. Lt. Lower Leg=7/2013.     Pt. had 1st Blood Clot after [id number], & Last=2013. Estelita Filter= Rt.Lateral Neck.    HTN (hypertension) 06/06/2013    Pt states that she does not have hypertension    Hypercholesteremia     Irregular heartbeat     Neuromuscular disorder      "neuropathy feet    Obese     PE (pulmonary embolism) 07/2013    bilat LE DVT.     Restless leg syndrome          Past Surgical History:   Procedure Laterality Date    ABDOMINAL SURGERY  2010    gastric sleeve    BILATERAL OOPHORECTOMY Bilateral 1/12/2015    CHOLECYSTECTOMY      DEBRIDEMENT OF FOOT Bilateral 5/10/2022    Procedure: DEBRIDEMENT, FOOT;  Surgeon: Maira De Los Santos DPM;  Location: [facility] OR;  Service: Podiatry;  Laterality: Bilateral;    Green' s filter Right 7/4/2012    Right Neck & Tunneled Down.    HERNIA REPAIR      "[place] of Hernias Repaires around th Belly Button.", pt. states    INCISION AND DRAINAGE FOOT Left 12/24/2022    Procedure: INCISION AND DRAINAGE, FOOT;  Surgeon: Fahad Razo DPM;  Location: [facility] OR;  Service: Podiatry;  Laterality: Left;    LAPAROSCOPIC CHOLECYSTECTOMY N/A 9/10/2020    Procedure: CHOLECYSTECTOMY, LAPAROSCOPIC;  Surgeon: Montrell Gutierrez MD;  Location: [facility] OR;  Service: General;  Laterality: N/A;  RN PREOP 9/9----COVID Negative  9/9    OVARIAN CYST REMOVAL  3/13/2014    MI REMOVAL OF OVARY/TUBE(S)      SPLENECTOMY, TOTAL  July 2003    TONSILLECTOMY      as a child    TYMPANOSTOMY TUBE PLACEMENT  1976    VEIN SURGERY  2003    Lt leg       Time Tracking:     OT Date of Treatment: 03/01/23  OT Start Time: 0955  OT Stop Time: 1019  OT Total Time (min): 24 min    Billable Minutes:Evaluation 15 (with PT)  Self Care/Home Management 9  Total Time 24    3/1/2023  "

## 2023-03-01 NOTE — CONSULTS
"[facility] Surg  Infectious Disease  Consult Note    Patient Name: Audrey Natarajan  MRN: [id number]  Admission Date: 2/23/2023  Hospital Length of Stay: 4 days  Attending Physician: Keyona Darden MD  Primary Care Provider: Donaldo Pena MD     Isolation Status: No active isolations    Patient information was obtained from patient and ER records.      Consults  Assessment/Plan:     Orthopedic  Charcot's joint of foot  53y/o M with h/o DM with charcot foot, DVT on Eliquis, PAD, ongoing tobacco abuse admitted 2/23 with chronic L foot wound and worsening pain/swelling. Notably just completed 4 weeks vancomycin at LTAC for possible (note prior bone biopsy path/cultures 1/13 negative for OM). LLE Dvt studies negative. S/p podiatry debridement at bedside - gram stain negative, cultures rare growth of pseudomonas. Currently on vancomycin. ID consulted for "foot wound, leukocytosis, elevated lactic"    JEYSON with  hemodynamically significant stenosis in the left and DPA. Multifocal mild to moderate grade stenoses is suspected throughout the left MIRACLE and, bilateral posterior tibial and peroneal arteries.    Not clear that infection is playing role in chronic foot ulcer. 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 MRI evidence of osteo could be recently treated infection.  Likely poor wound healing from combination from  DM, ongoing tobacco abuse, PAD and psychosocial factors.    Awaiting results of bone biopsy path/cultureto clarify if OM present. would not commit to another 6 weeks of abx without positive pathology/bone cultures    Recommendations:   -  agree with vanc/cefepime (and de-escalation pending culture results)  - defer need for amputation to podiatry, but would likely be definitive treatment of infection  - counseled on need for tobacco cessation  - please ensure she has adequate perfusion to heal wounds             Thank you for " "your consult. I will follow-up with patient. Please contact us if you have any additional questions.    Beverly Zavala MD  Infectious Disease  [facility] - Med Surg    Subjective:     Principal Problem: Osteomyelitis of left foot    HPI:   51y/o M with h/o DM with charcot foot, DVT on Eliquis, PAD, ongoing tobacco abuse admitted 2/23 with chronic L foot wound and worsening pain/swelling. Notably just completed 4 weeks vancomycin at LTAC for possible (note prior bone biopsy path/cultures 1/13 negative for OM). Wound improved and filling in since completing vancomycin. Reports using scooter to keep weight off of foot. Going to wound care. Denies f/c.     S/p podiatry debridement at bedside today    Currently on vancomycin      1 mo ago    Final Pathologic Diagnosis Bone, left foot, biopsy:   Viable bone with osteonecrosis-no acute osteomyelitis identified     ID consulted for "foot wound, leukocytosis, elevated lactic"             Interval history: NAEO. S/p OR debridement/biopsy with podiatry yesterday. Gram stain negative, cultures NGTD        Past Surgical History:   Procedure Laterality Date    ABDOMINAL SURGERY  2010    gastric sleeve    BILATERAL OOPHORECTOMY Bilateral 1/12/2015    CHOLECYSTECTOMY      DEBRIDEMENT OF FOOT Bilateral 5/10/2022    Procedure: DEBRIDEMENT, FOOT;  Surgeon: Maira De Los Santos DPM;  Location: [facility] OR;  Service: Podiatry;  Laterality: Bilateral;    Green' s filter Right 7/4/2012    Right Neck & Tunneled Down.    HERNIA REPAIR      "[place] of Hernias Repaires around th [place].", pt. states    INCISION AND DRAINAGE FOOT Left 12/24/2022    Procedure: INCISION AND DRAINAGE, FOOT;  Surgeon: Fahad Razo DPM;  Location: [facility] OR;  Service: Podiatry;  Laterality: Left;    LAPAROSCOPIC CHOLECYSTECTOMY N/A 9/10/2020    Procedure: CHOLECYSTECTOMY, LAPAROSCOPIC;  Surgeon: Montrell Gutierrez MD;  Location: [facility] OR;  Service: General;  Laterality: N/A;  RN PREOP 9/9----COVID Negative  9/9    " OVARIAN CYST REMOVAL  3/13/2014    SC REMOVAL OF OVARY/TUBE(S)      SPLENECTOMY, TOTAL  July 2003    TONSILLECTOMY      as a child    TYMPANOSTOMY TUBE PLACEMENT  1976    VEIN SURGERY  2003    Lt leg       Review of patient's allergies indicates:   Allergen Reactions    Morphine Other (See Comments)     Patient had a psychotic episode after taking Morphine  Agitation, hallucinations    Penicillins Anaphylaxis     Tolerated cephalosporins in the past    Januvia [sitagliptin] Hives    Carbamazepine Other (See Comments)     hyponatremia       No current facility-administered medications on file prior to encounter.     Current Outpatient Medications on File Prior to Encounter   Medication Sig    acetaminophen (TYLENOL) 500 MG tablet Take 2 tablets (1,000 mg total) by mouth every 6 (six) hours as needed for Pain.    albuterol (PROVENTIL/VENTOLIN HFA) 90 mcg/actuation inhaler INHALE 2 PUFFS INTO THE LUNGS EVERY 6 HOURS AS NEEDED FOR WHEEZING. RESCUE    apixaban (ELIQUIS) 5 mg Tab Take 1 tablet (5 mg total) by mouth 2 (two) times daily.    aspirin 81 MG Chew Take 1 tablet (81 mg total) by mouth once daily.    bumetanide (BUMEX) 1 MG tablet Take 1 tablet (1 mg total) by mouth once daily.    divalproex (DEPAKOTE) 500 MG TbEC Take 1 tablet (500 mg total) by mouth once daily. PO QAM    ferrous sulfate 325 (65 FE) MG EC tablet Take 1 tablet (325 mg total) by mouth once daily.    hydrOXYzine (ATARAX) 50 MG tablet Take 0.5 tablets (25 mg total) by mouth 4 (four) times daily as needed for Itching or Anxiety.    lisinopriL 10 MG tablet Take 1 tablet (10 mg total) by mouth once daily.    loratadine (CLARITIN) 10 mg tablet Take 1 tablet (10 mg total) by mouth once daily.    metFORMIN (GLUCOPHAGE) 1000 MG tablet Take 1 tablet (1,000 mg total) by mouth 2 (two) times daily with meals.    metoprolol tartrate (LOPRESSOR) 25 MG tablet Take 1 tablet (25 mg total) by mouth 2 (two) times daily.    multivitamin Tab Take 1  tablet by mouth once daily.    pantoprazole (PROTONIX) 40 MG tablet Take 1 tablet (40 mg total) by mouth once daily.    pravastatin (PRAVACHOL) 40 MG tablet Take 1 tablet (40 mg total) by mouth every evening.    risperiDONE (RISPERDAL M-TABS) 3 MG disintegrating tablet Take 1 tablet (3 mg total) by mouth 2 (two) times daily. (Patient taking differently: Take 3 mg by mouth nightly.)    VYVANSE 40 mg Cap Take 40 mg by mouth once daily.    albuterol-ipratropium (DUO-NEB) 2.5 mg-0.5 mg/3 mL nebulizer solution Take 3 mLs by nebulization every 6 (six) hours as needed for Wheezing or Shortness of Breath. Rescue    ammonium lactate (LAC-HYDRIN) 12 % lotion APPL Y ONCE TOPICALLY TWICE DAILY FOR 30 DAYS    DUPIXENT  mg/2 mL PnIj Inject into the skin.    fluticasone propionate (FLONASE) 50 mcg/actuation nasal spray 2 sprays (100 mcg total) by Each Nostril route daily as needed (Nasal congestion).    fluticasone-salmeterol diskus inhaler 250-50 mcg Inhale 1 puff into the lungs 2 (two) times daily. Controller    insulin detemir U-100 (LEVEMIR FLEXTOUCH) 100 unit/mL (3 mL) SubQ InPn pen Inject 22 Units into the skin every evening.    LIDOcaine (LIDODERM) 5 % Place 1 patch onto the skin once daily. Remove & Discard patch within 12 hours or as directed by MD    magnesium hydroxide 400 mg/5 ml (MILK OF MAGNESIA) 400 mg/5 mL Susp Take 30 mLs (2,400 mg total) by mouth daily as needed.    methocarbamoL (ROBAXIN) 500 MG Tab Take 500 mg by mouth. Frequency could not be confirmed.    nystatin (NYSTOP) powder APPLY TO ABDOMINAL AND BREAST SKIN FOLD TWICE DAILY.    oxyCODONE-acetaminophen (PERCOCET)  mg per tablet Take 1 tablet by mouth every 6 (six) hours as needed for Pain.    polyethylene glycol (GLYCOLAX) 17 gram PwPk Take 17 g by mouth once daily.    senna-docusate 8.6-50 mg (PERICOLACE) 8.6-50 mg per tablet Take 1 tablet by mouth 2 (two) times daily.    [DISCONTINUED] diclofenac sodium (VOLTAREN) 1 % Gel  Apply 2 g topically 4 (four) times daily as needed (Apply to painful area up to 4 times a day as needed for pain). Apply to painful area 4 times a day as needed for pain    [DISCONTINUED] furosemide (LASIX) 20 MG tablet TAKE 1 TABLET(20 MG) BY MOUTH EVERY DAY    [DISCONTINUED] QUEtiapine (SEROQUEL) 200 MG Tab Take 1 tablet (200 mg total) by mouth before breakfast.     Family History       Problem Relation (Age of Onset)    Cataracts Father    Diabetes Father, Paternal Grandfather    Heart disease Father, Paternal Grandfather    Hypertension Father    No Known Problems Mother, Sister, Brother, Maternal Aunt, Maternal Uncle, Paternal Aunt, Paternal Uncle, Maternal Grandfather    Ovarian cancer Maternal Grandmother, Paternal Grandmother          Tobacco Use    Smoking status: Every Day     Packs/day: 1.00     Years: 37.00     Pack years: 37.00     Types: Cigarettes     Last attempt to quit: 2020     Years since quittin.2    Smokeless tobacco: Current    Tobacco comments:     Enrolled in the [employer] on 5/3/14 ([facility] Member ID # [id number]). Ambulatory referral to Smoking Cessation Program   Substance and Sexual Activity    Alcohol use: No     Alcohol/week: 0.0 standard drinks    Drug use: No    Sexual activity: Yes     Partners: Male     Review of Systems   Constitutional:  Negative for chills, fatigue and fever.   HENT:  Negative for congestion and rhinorrhea.    Eyes:  Negative for photophobia and visual disturbance.   Respiratory:  Negative for cough and shortness of breath.    Cardiovascular:  Positive for leg swelling. Negative for chest pain and palpitations.   Gastrointestinal:  Negative for abdominal pain, diarrhea, nausea and vomiting.   Genitourinary:  Negative for dysuria, frequency and urgency.   Musculoskeletal:  Positive for arthralgias and myalgias.   Skin:  Positive for color change and wound. Negative for pallor and rash.   Neurological:  Negative for light-headedness and  headaches.   Psychiatric/Behavioral:  Negative for confusion and decreased concentration.    Objective:     Vital Signs (Most Recent):  Temp: 98.3 °F (36.8 °C) (03/01/23 1616)  Pulse: 65 (03/01/23 1616)  Resp: 18 (03/01/23 1616)  BP: (!) 158/70 (03/01/23 1616)  SpO2: 96 % (03/01/23 1616)   Vital Signs (24h Range):  Temp:  [97.9 °F (36.6 °C)-98.3 °F (36.8 °C)] 98.3 °F (36.8 °C)  Pulse:  [63-87] 65  Resp:  [18-20] 18  SpO2:  [93 %-97 %] 96 %  BP: (105-165)/(58-72) 158/70     Weight: 104 kg (229 lb 4.5 oz)  Body mass index is 37.01 kg/m².    Physical Exam  Vitals and nursing note reviewed.   Constitutional:       General: She is not in acute distress.     Appearance: She is well-developed.   HENT:      Head: Normocephalic and atraumatic.      Right Ear: External ear normal.      Left Ear: External ear normal.      Nose: Nose normal.   Eyes:      Conjunctiva/sclera: Conjunctivae normal.      Pupils: Pupils are equal, round, and reactive to light.   Cardiovascular:      Rate and Rhythm: Normal rate and regular rhythm.   Pulmonary:      Effort: Pulmonary effort is normal. No respiratory distress.      Breath sounds: Normal breath sounds. No wheezing.   Abdominal:      General: Bowel sounds are normal. There is no distension.      Palpations: Abdomen is soft.      Tenderness: There is no abdominal tenderness.      Comments: No palpable hepatomegaly or splenomegaly    Musculoskeletal:         General: No tenderness. Normal range of motion.      Cervical back: Normal range of motion and neck supple.      Comments: Dressing c/d/i   Skin:     General: Skin is warm and dry.   Neurological:      Mental Status: She is alert and oriented to person, place, and time.   Psychiatric:         Thought Content: Thought content normal.         CRANIAL NERVES     CN III, IV, VI   Pupils are equal, round, and reactive to light.     Significant Labs: All pertinent labs within the past 24 hours have been reviewed.    Significant Imaging: I  have reviewed all pertinent imaging results/findings within the past 24 hours.

## 2023-03-01 NOTE — SUBJECTIVE & OBJECTIVE
"Interval history: NAEO. S/p OR debridement/biopsy with podiatry yesterday. Gram stain negative, cultures NGTD        Past Surgical History:   Procedure Laterality Date    ABDOMINAL SURGERY  2010    gastric sleeve    BILATERAL OOPHORECTOMY Bilateral 1/12/2015    CHOLECYSTECTOMY      DEBRIDEMENT OF FOOT Bilateral 5/10/2022    Procedure: DEBRIDEMENT, FOOT;  Surgeon: Maira De Los Santos DPM;  Location: [facility] OR;  Service: Podiatry;  Laterality: Bilateral;    Green' s filter Right 7/4/2012    Right Neck & Tunneled Down.    HERNIA REPAIR      "[place] of Hernias Repaires around th [address].", pt. states    INCISION AND DRAINAGE FOOT Left 12/24/2022    Procedure: INCISION AND DRAINAGE, FOOT;  Surgeon: Fahad Razo DPM;  Location: [facility] OR;  Service: Podiatry;  Laterality: Left;    LAPAROSCOPIC CHOLECYSTECTOMY N/A 9/10/2020    Procedure: CHOLECYSTECTOMY, LAPAROSCOPIC;  Surgeon: Montrell Gutierrez MD;  Location: [facility] OR;  Service: General;  Laterality: N/A;  RN PREOP 9/9----COVID Negative  9/9    OVARIAN CYST REMOVAL  3/13/2014    MD REMOVAL OF OVARY/TUBE(S)      SPLENECTOMY, TOTAL  July 2003    TONSILLECTOMY      as a child    TYMPANOSTOMY TUBE PLACEMENT  1976    VEIN SURGERY  2003    Lt leg       Review of patient's allergies indicates:   Allergen Reactions    Morphine Other (See Comments)     Patient had a psychotic episode after taking Morphine  Agitation, hallucinations    Penicillins Anaphylaxis     Tolerated cephalosporins in the past    Januvia [sitagliptin] Hives    Carbamazepine Other (See Comments)     hyponatremia       No current facility-administered medications on file prior to encounter.     Current Outpatient Medications on File Prior to Encounter   Medication Sig    acetaminophen (TYLENOL) 500 MG tablet Take 2 tablets (1,000 mg total) by mouth every 6 (six) hours as needed for Pain.    albuterol (PROVENTIL/VENTOLIN HFA) 90 mcg/actuation inhaler INHALE 2 PUFFS INTO THE LUNGS EVERY 6 HOURS AS NEEDED FOR WHEEZING. " RESCUE    apixaban (ELIQUIS) 5 mg Tab Take 1 tablet (5 mg total) by mouth 2 (two) times daily.    aspirin 81 MG Chew Take 1 tablet (81 mg total) by mouth once daily.    bumetanide (BUMEX) 1 MG tablet Take 1 tablet (1 mg total) by mouth once daily.    divalproex (DEPAKOTE) 500 MG TbEC Take 1 tablet (500 mg total) by mouth once daily. PO QAM    ferrous sulfate 325 (65 FE) MG EC tablet Take 1 tablet (325 mg total) by mouth once daily.    hydrOXYzine (ATARAX) 50 MG tablet Take 0.5 tablets (25 mg total) by mouth 4 (four) times daily as needed for Itching or Anxiety.    lisinopriL 10 MG tablet Take 1 tablet (10 mg total) by mouth once daily.    loratadine (CLARITIN) 10 mg tablet Take 1 tablet (10 mg total) by mouth once daily.    metFORMIN (GLUCOPHAGE) 1000 MG tablet Take 1 tablet (1,000 mg total) by mouth 2 (two) times daily with meals.    metoprolol tartrate (LOPRESSOR) 25 MG tablet Take 1 tablet (25 mg total) by mouth 2 (two) times daily.    multivitamin Tab Take 1 tablet by mouth once daily.    pantoprazole (PROTONIX) 40 MG tablet Take 1 tablet (40 mg total) by mouth once daily.    pravastatin (PRAVACHOL) 40 MG tablet Take 1 tablet (40 mg total) by mouth every evening.    risperiDONE (RISPERDAL M-TABS) 3 MG disintegrating tablet Take 1 tablet (3 mg total) by mouth 2 (two) times daily. (Patient taking differently: Take 3 mg by mouth nightly.)    VYVANSE 40 mg Cap Take 40 mg by mouth once daily.    albuterol-ipratropium (DUO-NEB) 2.5 mg-0.5 mg/3 mL nebulizer solution Take 3 mLs by nebulization every 6 (six) hours as needed for Wheezing or Shortness of Breath. Rescue    ammonium lactate (LAC-HYDRIN) 12 % lotion APPL Y ONCE TOPICALLY TWICE DAILY FOR 30 DAYS    DUPIXENT  mg/2 mL PnIj Inject into the skin.    fluticasone propionate (FLONASE) 50 mcg/actuation nasal spray 2 sprays (100 mcg total) by Each Nostril route daily as needed (Nasal congestion).    fluticasone-salmeterol diskus inhaler 250-50 mcg Inhale 1 puff  into the lungs 2 (two) times daily. Controller    insulin detemir U-100 (LEVEMIR FLEXTOUCH) 100 unit/mL (3 mL) SubQ InPn pen Inject 22 Units into the skin every evening.    LIDOcaine (LIDODERM) 5 % Place 1 patch onto the skin once daily. Remove & Discard patch within 12 hours or as directed by MD    magnesium hydroxide 400 mg/5 ml (MILK OF MAGNESIA) 400 mg/5 mL Susp Take 30 mLs (2,400 mg total) by mouth daily as needed.    methocarbamoL (ROBAXIN) 500 MG Tab Take 500 mg by mouth. Frequency could not be confirmed.    nystatin (NYSTOP) powder APPLY TO ABDOMINAL AND BREAST SKIN FOLD TWICE DAILY.    oxyCODONE-acetaminophen (PERCOCET)  mg per tablet Take 1 tablet by mouth every 6 (six) hours as needed for Pain.    polyethylene glycol (GLYCOLAX) 17 gram PwPk Take 17 g by mouth once daily.    senna-docusate 8.6-50 mg (PERICOLACE) 8.6-50 mg per tablet Take 1 tablet by mouth 2 (two) times daily.    [DISCONTINUED] diclofenac sodium (VOLTAREN) 1 % Gel Apply 2 g topically 4 (four) times daily as needed (Apply to painful area up to 4 times a day as needed for pain). Apply to painful area 4 times a day as needed for pain    [DISCONTINUED] furosemide (LASIX) 20 MG tablet TAKE 1 TABLET(20 MG) BY MOUTH EVERY DAY    [DISCONTINUED] QUEtiapine (SEROQUEL) 200 MG Tab Take 1 tablet (200 mg total) by mouth before breakfast.     Family History       Problem Relation (Age of Onset)    Cataracts Father    Diabetes Father, Paternal Grandfather    Heart disease Father, Paternal Grandfather    Hypertension Father    No Known Problems Mother, Sister, Brother, Maternal Aunt, Maternal Uncle, Paternal Aunt, Paternal Uncle, Maternal Grandfather    Ovarian cancer Maternal Grandmother, Paternal Grandmother          Tobacco Use    Smoking status: Every Day     Packs/day: 1.00     Years: 37.00     Pack years: 37.00     Types: Cigarettes     Last attempt to quit: 2020     Years since quittin.2    Smokeless tobacco: Current    Tobacco  comments:     Enrolled in the [employer] on 5/3/14 ([facility] Member ID # [id number]). Ambulatory referral to Smoking Cessation Program   Substance and Sexual Activity    Alcohol use: No     Alcohol/week: 0.0 standard drinks    Drug use: No    Sexual activity: Yes     Partners: Male     Review of Systems   Constitutional:  Negative for chills, fatigue and fever.   HENT:  Negative for congestion and rhinorrhea.    Eyes:  Negative for photophobia and visual disturbance.   Respiratory:  Negative for cough and shortness of breath.    Cardiovascular:  Positive for leg swelling. Negative for chest pain and palpitations.   Gastrointestinal:  Negative for abdominal pain, diarrhea, nausea and vomiting.   Genitourinary:  Negative for dysuria, frequency and urgency.   Musculoskeletal:  Positive for arthralgias and myalgias.   Skin:  Positive for color change and wound. Negative for pallor and rash.   Neurological:  Negative for light-headedness and headaches.   Psychiatric/Behavioral:  Negative for confusion and decreased concentration.    Objective:     Vital Signs (Most Recent):  Temp: 98.3 °F (36.8 °C) (03/01/23 1616)  Pulse: 65 (03/01/23 1616)  Resp: 18 (03/01/23 1616)  BP: (!) 158/70 (03/01/23 1616)  SpO2: 96 % (03/01/23 1616)   Vital Signs (24h Range):  Temp:  [97.9 °F (36.6 °C)-98.3 °F (36.8 °C)] 98.3 °F (36.8 °C)  Pulse:  [63-87] 65  Resp:  [18-20] 18  SpO2:  [93 %-97 %] 96 %  BP: (105-165)/(58-72) 158/70     Weight: 104 kg (229 lb 4.5 oz)  Body mass index is 37.01 kg/m².    Physical Exam  Vitals and nursing note reviewed.   Constitutional:       General: She is not in acute distress.     Appearance: She is well-developed.   HENT:      Head: Normocephalic and atraumatic.      Right Ear: External ear normal.      Left Ear: External ear normal.      Nose: Nose normal.   Eyes:      Conjunctiva/sclera: Conjunctivae normal.      Pupils: Pupils are equal, round, and reactive to light.   Cardiovascular:      Rate and Rhythm:  Normal rate and regular rhythm.   Pulmonary:      Effort: Pulmonary effort is normal. No respiratory distress.      Breath sounds: Normal breath sounds. No wheezing.   Abdominal:      General: Bowel sounds are normal. There is no distension.      Palpations: Abdomen is soft.      Tenderness: There is no abdominal tenderness.      Comments: No palpable hepatomegaly or splenomegaly    Musculoskeletal:         General: No tenderness. Normal range of motion.      Cervical back: Normal range of motion and neck supple.      Comments: Dressing c/d/i   Skin:     General: Skin is warm and dry.   Neurological:      Mental Status: She is alert and oriented to person, place, and time.   Psychiatric:         Thought Content: Thought content normal.         CRANIAL NERVES     CN III, IV, VI   Pupils are equal, round, and reactive to light.     Significant Labs: All pertinent labs within the past 24 hours have been reviewed.    Significant Imaging: I have reviewed all pertinent imaging results/findings within the past 24 hours.

## 2023-03-01 NOTE — ASSESSMENT & PLAN NOTE
BP is controlled  Continue home lisinopril and metoprolol  Bumex held- may need to resume at some point but no edema currently

## 2023-03-01 NOTE — PT/OT/SLP EVAL
Physical Therapy Evaluation    Patient Name:  Audrey Natarajan   MRN:  [id number]    Recommendations:     Discharge Recommendations:  (Per MD)   Discharge Equipment Recommendations: bedside commode     Assessment:     Audrey Natarajan is a 52 y.o. female admitted with a medical diagnosis of Osteomyelitis of left foot.  She presents with the following impairments/functional limitations: impaired functional mobility, decreased lower extremity function, pain, impaired skin, orthopedic precautions .    Rehab Prognosis: Good; patient would benefit from acute skilled PT services to address these deficits and reach maximum level of function.    Recent Surgery: Procedure(s) (LRB):  DEBRIDEMENT, FOOT,biopsy (Left) 1 Day Post-Op    Plan:     During this hospitalization, patient to be seen 1x to address the identified rehab impairments via initial evaluation and progress toward the following goals:    Plan of Care Expires:  03/01/23    Subjective     Chief Complaint: Pain (L) leg   Patient/Family Comments/goals: Pt agreeable to therapy evaluations.  Pain/Comfort:  Pain Rating 1: 8/10  Location - Side 1: Left  Location 1: leg  Pain Addressed 1: Reposition    Patients cultural, spiritual, Protestant conflicts given the current situation: no    Living Environment:  PTA pt lived alone in a [facility] trailer with ramp entry.  Prior to admission, patients level of function was mod I.  Equipment used at home: wheelchair, shower chair, other (see comments) (knee scooter).  DME owned (not currently used):  rollator .  Upon discharge, patient will have assistance from unknown.    Objective:     Communicated with nurseRadha prior to session.  Patient found supine with peripheral IV, wound vac  upon PT entry to room.    General Precautions: Standard,    Orthopedic Precautions:LLE non weight bearing   Braces:  ((L) boot)  Respiratory Status: Room air    Exams:  Cognitive Exam:  Patient is oriented to Person, Place, Time, and  Situation  RLE ROM: WFL  RLE Strength: WFL  LLE ROM: WFL except ankle NT; has boot  LLE Strength: NT    Functional Mobility:  Bed Mobility:     Supine to Sit: modified independence  Transfers:     Sit to Stand:  supervision with no AD  Toilet Transfer: supervision with  no AD  using  Stand Pivot  Transferred to B/S chair from [facility] with SBA.      AM-PAC 6 CLICK MOBILITY  Total Score:20       Treatment & Education:  Educated on role of PT and POC, reviewed HEP, pt able to demonstrate exs and perform independently.    Patient left  reclined in chair   with all lines intact, call button in reach, and all needs in reach .    GOALS:   Multidisciplinary Problems       Physical Therapy Goals          Problem: Physical Therapy    Goal Priority Disciplines Outcome Goal Variances Interventions   Physical Therapy Goal     PT, PT/OT Adequate for Care Transition                         History:     Past Medical History:   Diagnosis Date    ADHD (attention deficit hyperactivity disorder)     Arthritis     Asthma     Bipolar 1 disorder     Cataract     Cigarette smoker     COPD (chronic obstructive pulmonary disease)     Coronary artery disease     A fib    Depression     bipolar manic depresson    Diabetes mellitus     Diabetic foot ulcers     Diabetic neuropathy     DVT of lower extremity, bilateral 07/2013    bilateral LE DVT. [place] filter placed.     Encounter for blood transfusion     History of blood clots 1. Left Leg=2003; 2.Bilateral Groin=Blood Clots= 5 or 6/ 2013 & 7/2013; 3. LLL of Lung=7/2013;  4. Lt. Lower Leg=7/2013.     Pt. had 1st Blood Clot after Mxypilfsmhxm=5561, & Last=2013. [place] Filter= Rt.Lateral Neck.    HTN (hypertension) 06/06/2013    Pt states that she does not have hypertension    Hypercholesteremia     Irregular heartbeat     Neuromuscular disorder     neuropathy feet    Obese     PE (pulmonary embolism) 07/2013    bilat LE DVT.     Restless leg syndrome        Past Surgical History:  "  Procedure Laterality Date    ABDOMINAL SURGERY  2010    gastric sleeve    BILATERAL OOPHORECTOMY Bilateral 1/12/2015    CHOLECYSTECTOMY      DEBRIDEMENT OF FOOT Bilateral 5/10/2022    Procedure: DEBRIDEMENT, FOOT;  Surgeon: Maira De Los Santos DPM;  Location: [facility] OR;  Service: Podiatry;  Laterality: Bilateral;    Green' s filter Right 7/4/2012    Right Neck & Tunneled Down.    HERNIA REPAIR      "[place] of Hernias Repaires around th Belly Button.", pt. states    INCISION AND DRAINAGE FOOT Left 12/24/2022    Procedure: INCISION AND DRAINAGE, FOOT;  Surgeon: Fahad Razo DPM;  Location: [facility] OR;  Service: Podiatry;  Laterality: Left;    LAPAROSCOPIC CHOLECYSTECTOMY N/A 9/10/2020    Procedure: CHOLECYSTECTOMY, LAPAROSCOPIC;  Surgeon: Montrell Gutierrez MD;  Location: [facility] OR;  Service: General;  Laterality: N/A;  RN PREOP 9/9----COVID Negative  9/9    OVARIAN CYST REMOVAL  3/13/2014    OK REMOVAL OF OVARY/TUBE(S)      SPLENECTOMY, TOTAL  July 2003    TONSILLECTOMY      as a child    TYMPANOSTOMY TUBE PLACEMENT  1976    VEIN SURGERY  2003    Lt leg       Time Tracking:     PT Received On: 03/01/23  PT Start Time: 0956     PT Stop Time: 1015  PT Total Time (min): 19 min     Billable Minutes: Evaluation 19 03/01/2023  "

## 2023-03-01 NOTE — ASSESSMENT & PLAN NOTE
Patient's FSGs are controlled on current medication regimen.  Last A1c reviewed-   Lab Results   Component Value Date    HGBA1C 7.1 (H) 12/22/2022     Most recent fingerstick glucose reviewed-   Recent Labs   Lab 02/28/23  1127 02/28/23  1443 02/28/23  1612 03/01/23  0703   POCTGLUCOSE 144* 124* 81 217*     Current correctional scale  Medium  Increase anti-hyperglycemic dose as follows-   Antihyperglycemics (From admission, onward)    Start     Stop Route Frequency Ordered    03/01/23 0745  insulin aspart U-100 pen 5 Units         -- SubQ 3 times daily with meals 03/01/23 0645    02/23/23 2330  insulin detemir U-100 pen 15 Units         -- SubQ Nightly 02/23/23 2229    02/23/23 2328  insulin aspart U-100 pen 1-10 Units         -- SubQ Before meals & nightly PRN 02/23/23 2229      Diabetic Diet  Hold Oral hypoglycemics while patient is in the hospital.

## 2023-03-02 LAB
ALBUMIN SERPL BCP-MCNC: 3 G/DL (ref 3.5–5.2)
ALP SERPL-CCNC: 101 U/L (ref 55–135)
ALT SERPL W/O P-5'-P-CCNC: 46 U/L (ref 10–44)
ANION GAP SERPL CALC-SCNC: 9 MMOL/L (ref 8–16)
AST SERPL-CCNC: 24 U/L (ref 10–40)
BILIRUB SERPL-MCNC: 0.3 MG/DL (ref 0.1–1)
BUN SERPL-MCNC: 11 MG/DL (ref 6–20)
CALCIUM SERPL-MCNC: 9.4 MG/DL (ref 8.7–10.5)
CHLORIDE SERPL-SCNC: 102 MMOL/L (ref 95–110)
CO2 SERPL-SCNC: 30 MMOL/L (ref 23–29)
CREAT SERPL-MCNC: 0.7 MG/DL (ref 0.5–1.4)
EST. GFR  (NO RACE VARIABLE): >60 ML/MIN/1.73 M^2
FINAL PATHOLOGIC DIAGNOSIS: NORMAL
GLUCOSE SERPL-MCNC: 162 MG/DL (ref 70–110)
GROSS: NORMAL
Lab: NORMAL
MAGNESIUM SERPL-MCNC: 1.6 MG/DL (ref 1.6–2.6)
POCT GLUCOSE: 193 MG/DL (ref 70–110)
POCT GLUCOSE: 228 MG/DL (ref 70–110)
POCT GLUCOSE: 250 MG/DL (ref 70–110)
POCT GLUCOSE: 308 MG/DL (ref 70–110)
POTASSIUM SERPL-SCNC: 4.3 MMOL/L (ref 3.5–5.1)
PROT SERPL-MCNC: 6.4 G/DL (ref 6–8.4)
SODIUM SERPL-SCNC: 141 MMOL/L (ref 136–145)
VANCOMYCIN TROUGH SERPL-MCNC: 15.3 UG/ML (ref 10–22)

## 2023-03-02 PROCEDURE — 83735 ASSAY OF MAGNESIUM: CPT | Performed by: PODIATRIST

## 2023-03-02 PROCEDURE — 63600175 PHARM REV CODE 636 W HCPCS: Performed by: PODIATRIST

## 2023-03-02 PROCEDURE — 25000003 PHARM REV CODE 250: Performed by: STUDENT IN AN ORGANIZED HEALTH CARE EDUCATION/TRAINING PROGRAM

## 2023-03-02 PROCEDURE — 99900035 HC TECH TIME PER 15 MIN (STAT)

## 2023-03-02 PROCEDURE — 80053 COMPREHEN METABOLIC PANEL: CPT | Performed by: PODIATRIST

## 2023-03-02 PROCEDURE — 94761 N-INVAS EAR/PLS OXIMETRY MLT: CPT

## 2023-03-02 PROCEDURE — 63600175 PHARM REV CODE 636 W HCPCS: Performed by: NURSE PRACTITIONER

## 2023-03-02 PROCEDURE — 11000001 HC ACUTE MED/SURG PRIVATE ROOM

## 2023-03-02 PROCEDURE — 63600175 PHARM REV CODE 636 W HCPCS: Mod: TB,JG | Performed by: PODIATRIST

## 2023-03-02 PROCEDURE — 63600175 PHARM REV CODE 636 W HCPCS: Performed by: STUDENT IN AN ORGANIZED HEALTH CARE EDUCATION/TRAINING PROGRAM

## 2023-03-02 PROCEDURE — 25000003 PHARM REV CODE 250: Performed by: HOSPITALIST

## 2023-03-02 PROCEDURE — 36415 COLL VENOUS BLD VENIPUNCTURE: CPT | Performed by: HOSPITALIST

## 2023-03-02 PROCEDURE — 80202 ASSAY OF VANCOMYCIN: CPT | Performed by: HOSPITALIST

## 2023-03-02 PROCEDURE — 63600175 PHARM REV CODE 636 W HCPCS: Performed by: HOSPITALIST

## 2023-03-02 PROCEDURE — 25000003 PHARM REV CODE 250: Performed by: PODIATRIST

## 2023-03-02 RX ORDER — HYDROMORPHONE HYDROCHLORIDE 1 MG/ML
0.5 INJECTION, SOLUTION INTRAMUSCULAR; INTRAVENOUS; SUBCUTANEOUS EVERY 4 HOURS PRN
Status: DISCONTINUED | OUTPATIENT
Start: 2023-03-02 | End: 2023-03-03 | Stop reason: HOSPADM

## 2023-03-02 RX ORDER — HYDROCODONE BITARTRATE AND ACETAMINOPHEN 5; 325 MG/1; MG/1
1 TABLET ORAL EVERY 4 HOURS PRN
Status: DISCONTINUED | OUTPATIENT
Start: 2023-03-02 | End: 2023-03-03 | Stop reason: HOSPADM

## 2023-03-02 RX ADMIN — METOPROLOL TARTRATE 25 MG: 25 TABLET, FILM COATED ORAL at 09:03

## 2023-03-02 RX ADMIN — INSULIN ASPART 2 UNITS: 100 INJECTION, SOLUTION INTRAVENOUS; SUBCUTANEOUS at 09:03

## 2023-03-02 RX ADMIN — ONDANSETRON 4 MG: 2 INJECTION INTRAMUSCULAR; INTRAVENOUS at 04:03

## 2023-03-02 RX ADMIN — APIXABAN 5 MG: 5 TABLET, FILM COATED ORAL at 08:03

## 2023-03-02 RX ADMIN — HYDROMORPHONE HYDROCHLORIDE 0.5 MG: 1 INJECTION, SOLUTION INTRAMUSCULAR; INTRAVENOUS; SUBCUTANEOUS at 01:03

## 2023-03-02 RX ADMIN — METOPROLOL TARTRATE 25 MG: 25 TABLET, FILM COATED ORAL at 08:03

## 2023-03-02 RX ADMIN — PRAVASTATIN SODIUM 40 MG: 40 TABLET ORAL at 09:03

## 2023-03-02 RX ADMIN — APIXABAN 5 MG: 5 TABLET, FILM COATED ORAL at 09:03

## 2023-03-02 RX ADMIN — INSULIN ASPART 5 UNITS: 100 INJECTION, SOLUTION INTRAVENOUS; SUBCUTANEOUS at 12:03

## 2023-03-02 RX ADMIN — LISINOPRIL 10 MG: 5 TABLET ORAL at 08:03

## 2023-03-02 RX ADMIN — INSULIN ASPART 5 UNITS: 100 INJECTION, SOLUTION INTRAVENOUS; SUBCUTANEOUS at 08:03

## 2023-03-02 RX ADMIN — CEFEPIME HYDROCHLORIDE 2 G: 2 INJECTION, SOLUTION INTRAVENOUS at 09:03

## 2023-03-02 RX ADMIN — VANCOMYCIN HYDROCHLORIDE 1750 MG: 500 INJECTION, POWDER, LYOPHILIZED, FOR SOLUTION INTRAVENOUS at 04:03

## 2023-03-02 RX ADMIN — SENNOSIDES AND DOCUSATE SODIUM 2 TABLET: 50; 8.6 TABLET ORAL at 09:03

## 2023-03-02 RX ADMIN — INSULIN ASPART 4 UNITS: 100 INJECTION, SOLUTION INTRAVENOUS; SUBCUTANEOUS at 08:03

## 2023-03-02 RX ADMIN — CEFEPIME HYDROCHLORIDE 2 G: 2 INJECTION, SOLUTION INTRAVENOUS at 08:03

## 2023-03-02 RX ADMIN — HYDROCODONE BITARTRATE AND ACETAMINOPHEN 1 TABLET: 5; 325 TABLET ORAL at 05:03

## 2023-03-02 RX ADMIN — HYDROMORPHONE HYDROCHLORIDE 0.5 MG: 1 INJECTION, SOLUTION INTRAMUSCULAR; INTRAVENOUS; SUBCUTANEOUS at 03:03

## 2023-03-02 RX ADMIN — MICONAZOLE NITRATE 1 APPLICATOR: 20 CREAM VAGINAL at 09:03

## 2023-03-02 RX ADMIN — SENNOSIDES AND DOCUSATE SODIUM 2 TABLET: 50; 8.6 TABLET ORAL at 08:03

## 2023-03-02 RX ADMIN — HYDROMORPHONE HYDROCHLORIDE 0.5 MG: 1 INJECTION, SOLUTION INTRAMUSCULAR; INTRAVENOUS; SUBCUTANEOUS at 09:03

## 2023-03-02 RX ADMIN — INSULIN ASPART 8 UNITS: 100 INJECTION, SOLUTION INTRAVENOUS; SUBCUTANEOUS at 04:03

## 2023-03-02 RX ADMIN — INSULIN ASPART 2 UNITS: 100 INJECTION, SOLUTION INTRAVENOUS; SUBCUTANEOUS at 12:03

## 2023-03-02 RX ADMIN — HYDROCODONE BITARTRATE AND ACETAMINOPHEN 1 TABLET: 5; 325 TABLET ORAL at 12:03

## 2023-03-02 RX ADMIN — POLYETHYLENE GLYCOL 3350 17 G: 17 POWDER, FOR SOLUTION ORAL at 08:03

## 2023-03-02 RX ADMIN — DIVALPROEX SODIUM 500 MG: 250 TABLET, DELAYED RELEASE ORAL at 08:03

## 2023-03-02 RX ADMIN — RISPERIDONE 3 MG: 1 SOLUTION ORAL at 09:03

## 2023-03-02 RX ADMIN — CEFEPIME HYDROCHLORIDE 2 G: 2 INJECTION, SOLUTION INTRAVENOUS at 12:03

## 2023-03-02 RX ADMIN — HYDROMORPHONE HYDROCHLORIDE 0.5 MG: 1 INJECTION, SOLUTION INTRAMUSCULAR; INTRAVENOUS; SUBCUTANEOUS at 08:03

## 2023-03-02 RX ADMIN — INSULIN ASPART 5 UNITS: 100 INJECTION, SOLUTION INTRAVENOUS; SUBCUTANEOUS at 04:03

## 2023-03-02 NOTE — PROGRESS NOTES
Pharmacokinetic Assessment Follow Up: IV Vancomycin    Vancomycin serum concentration assessment(s):    The trough level was drawn correctly and can be used to guide therapy at this time. The measurement is within the desired definitive target range of 15 to 20 mcg/mL.    Vancomycin Regimen Plan:    Continue regimen to Vancomycin 1750 mg IV every 12 hours with next serum trough concentration measured at 03:00 prior to fourth dose on 3/4/23    Drug levels (last 3 results):  Recent Labs   Lab Result Units 02/28/23  0424 03/02/23  0303   Vancomycin, Random ug/mL 2.4  --    Vancomycin-Trough ug/mL  --  15.3       Pharmacy will continue to follow and monitor vancomycin.    Please contact pharmacy at extension [phone] for questions regarding this assessment.    Thank you for the consult,   Maria E Lozano       Patient brief summary:  Audrey Natarajan is a 52 y.o. female initiated on antimicrobial therapy with IV Vancomycin for treatment of bone/joint infection    The patient's current regimen is Vancomycin 1750 mg IV q12h    Drug Allergies:   Review of patient's allergies indicates:   Allergen Reactions    Morphine Other (See Comments)     Patient had a psychotic episode after taking Morphine  Agitation, hallucinations    Penicillins Anaphylaxis     Tolerated cephalosporins in the past    Januvia [sitagliptin] Hives    Carbamazepine Other (See Comments)     hyponatremia       Actual Body Weight:   104 kg    Renal Function:   Estimated Creatinine Clearance: 117.2 mL/min (based on SCr of 0.7 mg/dL).,     Dialysis Method (if applicable):  N/A    CBC (last 72 hours):  Recent Labs   Lab Result Units 02/28/23  0424 03/01/23  0358   WBC K/uL 11.30 11.00   Hemoglobin g/dL 10.9* 10.3*   Hematocrit % 35.7* 33.7*   Platelets K/uL 707* 682*   Gran % % 38.8 46.5   Lymph % % 42.0 36.2   Mono % % 12.1 10.6   Eosinophil % % 5.4 5.0   Basophil % % 1.3 1.3   Differential Method  Automated Automated       Metabolic Panel (last 72  hours):  Recent Labs   Lab Result Units 02/28/23  0424 03/01/23  0358 03/02/23  0303   Sodium mmol/L 142 139 141   Potassium mmol/L 4.3 4.3 4.3   Chloride mmol/L 102 102 102   CO2 mmol/L 30* 28 30*   Glucose mg/dL 153* 225* 162*   BUN mg/dL 9 11 11   Creatinine mg/dL 0.6 0.7 0.7   Albumin g/dL 3.2* 2.9* 3.0*   Total Bilirubin mg/dL 0.4 0.2 0.3   Alkaline Phosphatase U/L 92 112 101   AST U/L 50* 64* 24   ALT U/L 36 70* 46*   Magnesium mg/dL 1.6 1.5* 1.6   Phosphorus mg/dL 4.7* 4.3  --        Vancomycin Administrations:  vancomycin given in the last 96 hours                     vancomycin (VANCOCIN) 1,750 mg in dextrose 5 % (D5W) 500 mL IVPB (mg) 1,750 mg New Bag 03/02/23 0440     1,750 mg New Bag 03/01/23 1520     1,750 mg New Bag  0445     1,750 mg New Bag 02/28/23 1615    vancomycin (VANCOCIN) 1,750 mg in dextrose 5 % (D5W) 500 mL IVPB (mg) 1,750 mg New Bag 02/26/23 0823                    Microbiologic Results:  Microbiology Results (last 7 days)       Procedure Component Value Units Date/Time    AFB Culture & Smear [[id number]] Collected: 02/28/23 1401    Order Status: Sent Specimen: Biopsy from Foot, Left Updated: 03/01/23 1423    Aerobic culture [[id number]] Collected: 02/28/23 1401    Order Status: Completed Specimen: Biopsy from Foot, Left Updated: 03/01/23 1056     Aerobic Bacterial Culture No growth    Narrative:      Left Trocar Biopsy Cuboid    Gram stain [[id number]] Collected: 02/28/23 1401    Order Status: Completed Specimen: Biopsy from Foot, Left Updated: 03/01/23 1042     Gram Stain Result No WBC's      No organisms seen    Narrative:      Left Trocar Biopsy Cuboid    Culture, Anaerobe [[id number]] Collected: 02/28/23 1401    Order Status: Sent Specimen: Biopsy from Foot, Left Updated: 02/28/23 1635    Fungus culture [[id number]] Collected: 02/28/23 1401    Order Status: Sent Specimen: Biopsy from Foot, Left Updated: 02/28/23 1634    Culture, Anaerobe [[id number]] Collected: 02/24/23 0936    Order Status:  Completed Specimen: Wound from Foot, Left Updated: 02/28/23 0838     Anaerobic Culture No anaerobes isolated    Blood Culture #2 **CANNOT BE ORDERED STAT** [[id number]] Collected: 02/23/23 1558    Order Status: Completed Specimen: Blood from Peripheral, Antecubital, Right Updated: 02/27/23 1703     Blood Culture, Routine No Growth after 4 days.    Blood Culture #1 **CANNOT BE ORDERED STAT** [[id number]] Collected: 02/23/23 1614    Order Status: Completed Specimen: Blood from Peripheral, Antecubital, Left Updated: 02/27/23 1703     Blood Culture, Routine No Growth after 4 days.    Aerobic culture [[id number]]  (Abnormal)  (Susceptibility) Collected: 02/24/23 0936    Order Status: Completed Specimen: Wound from Foot, Left Updated: 02/26/23 0603     Aerobic Bacterial Culture PSEUDOMONAS AERUGINOSA  Rare      Gram stain [[id number]] Collected: 02/24/23 0936    Order Status: Completed Specimen: Wound from Foot, Left Updated: 02/24/23 1226     Gram Stain Result No WBC's      No organisms seen

## 2023-03-02 NOTE — PLAN OF CARE
Problem: Adult Inpatient Plan of Care  Goal: Plan of Care Review  Outcome: Ongoing, Progressing  Goal: Optimal Comfort and Wellbeing  Outcome: Ongoing, Progressing  Goal: Readiness for Transition of Care  Outcome: Ongoing, Progressing     Problem: Infection  Goal: Absence of Infection Signs and Symptoms  Outcome: Ongoing, Progressing     Problem: Fall Injury Risk  Goal: Absence of Fall and Fall-Related Injury  Outcome: Ongoing, Progressing     Problem: Diabetes Comorbidity  Goal: Blood Glucose Level Within Targeted Range  Outcome: Ongoing, Progressing

## 2023-03-02 NOTE — CONSULTS
[facility] Medicine  Telemedicine Consult Note     Patient has been accepted for transfer to [facility] and will be followed through telemedicine services beginning at 7 AM.    Mariam Montano MD  [facility] Medicine Staff

## 2023-03-02 NOTE — PROGRESS NOTES
Awaiting [facility] lab draw and result, once timed and due 3-4-23 at 0300.  Patient's serum creatinine is stable and WNL at 0.7.  Will continue to follow and monitor.  Thanks you for the consult

## 2023-03-03 ENCOUNTER — TELEPHONE (OUTPATIENT)
Dept: FAMILY MEDICINE | Facility: CLINIC | Age: 53
End: 2023-03-03
Payer: MEDICAID

## 2023-03-03 VITALS
WEIGHT: 229.25 LBS | RESPIRATION RATE: 19 BRPM | BODY MASS INDEX: 36.84 KG/M2 | OXYGEN SATURATION: 95 % | HEART RATE: 69 BPM | HEIGHT: 66 IN | SYSTOLIC BLOOD PRESSURE: 135 MMHG | TEMPERATURE: 98 F | DIASTOLIC BLOOD PRESSURE: 63 MMHG

## 2023-03-03 LAB
ALBUMIN SERPL BCP-MCNC: 3 G/DL (ref 3.5–5.2)
ALP SERPL-CCNC: 97 U/L (ref 55–135)
ALT SERPL W/O P-5'-P-CCNC: 34 U/L (ref 10–44)
ANION GAP SERPL CALC-SCNC: 9 MMOL/L (ref 8–16)
AST SERPL-CCNC: 15 U/L (ref 10–40)
BILIRUB SERPL-MCNC: 0.3 MG/DL (ref 0.1–1)
BUN SERPL-MCNC: 10 MG/DL (ref 6–20)
CALCIUM SERPL-MCNC: 9.8 MG/DL (ref 8.7–10.5)
CHLORIDE SERPL-SCNC: 101 MMOL/L (ref 95–110)
CO2 SERPL-SCNC: 30 MMOL/L (ref 23–29)
CREAT SERPL-MCNC: 0.7 MG/DL (ref 0.5–1.4)
EST. GFR  (NO RACE VARIABLE): >60 ML/MIN/1.73 M^2
GLUCOSE SERPL-MCNC: 190 MG/DL (ref 70–110)
MAGNESIUM SERPL-MCNC: 1.6 MG/DL (ref 1.6–2.6)
POCT GLUCOSE: 227 MG/DL (ref 70–110)
POCT GLUCOSE: 380 MG/DL (ref 70–110)
POTASSIUM SERPL-SCNC: 4.9 MMOL/L (ref 3.5–5.1)
PROT SERPL-MCNC: 6.5 G/DL (ref 6–8.4)
SODIUM SERPL-SCNC: 140 MMOL/L (ref 136–145)

## 2023-03-03 PROCEDURE — 25000003 PHARM REV CODE 250: Performed by: ANESTHESIOLOGY

## 2023-03-03 PROCEDURE — 99232 PR SUBSEQUENT HOSPITAL CARE,LEVL II: ICD-10-PCS | Mod: ,,, | Performed by: INTERNAL MEDICINE

## 2023-03-03 PROCEDURE — 83735 ASSAY OF MAGNESIUM: CPT | Performed by: PODIATRIST

## 2023-03-03 PROCEDURE — 25000003 PHARM REV CODE 250: Performed by: HOSPITALIST

## 2023-03-03 PROCEDURE — 25000003 PHARM REV CODE 250: Performed by: STUDENT IN AN ORGANIZED HEALTH CARE EDUCATION/TRAINING PROGRAM

## 2023-03-03 PROCEDURE — 63600175 PHARM REV CODE 636 W HCPCS: Performed by: STUDENT IN AN ORGANIZED HEALTH CARE EDUCATION/TRAINING PROGRAM

## 2023-03-03 PROCEDURE — A4216 STERILE WATER/SALINE, 10 ML: HCPCS | Performed by: ANESTHESIOLOGY

## 2023-03-03 PROCEDURE — 25000003 PHARM REV CODE 250: Performed by: PODIATRIST

## 2023-03-03 PROCEDURE — 63600175 PHARM REV CODE 636 W HCPCS: Mod: TB,JG | Performed by: PODIATRIST

## 2023-03-03 PROCEDURE — 36415 COLL VENOUS BLD VENIPUNCTURE: CPT | Performed by: PODIATRIST

## 2023-03-03 PROCEDURE — 99232 SBSQ HOSP IP/OBS MODERATE 35: CPT | Mod: ,,, | Performed by: INTERNAL MEDICINE

## 2023-03-03 PROCEDURE — 99232 SBSQ HOSP IP/OBS MODERATE 35: CPT | Mod: ,,, | Performed by: PODIATRIST

## 2023-03-03 PROCEDURE — 63600175 PHARM REV CODE 636 W HCPCS: Performed by: HOSPITALIST

## 2023-03-03 PROCEDURE — 99232 PR SUBSEQUENT HOSPITAL CARE,LEVL II: ICD-10-PCS | Mod: ,,, | Performed by: PODIATRIST

## 2023-03-03 PROCEDURE — 80053 COMPREHEN METABOLIC PANEL: CPT | Performed by: PODIATRIST

## 2023-03-03 RX ORDER — POLYETHYLENE GLYCOL 3350 17 G/17G
17 POWDER, FOR SOLUTION ORAL DAILY
Qty: 510 G | Refills: 1 | Status: SHIPPED | OUTPATIENT
Start: 2023-03-03 | End: 2023-10-06 | Stop reason: CLARIF

## 2023-03-03 RX ORDER — OXYCODONE AND ACETAMINOPHEN 10; 325 MG/1; MG/1
1 TABLET ORAL EVERY 6 HOURS PRN
Qty: 15 TABLET | Refills: 0 | Status: SHIPPED | OUTPATIENT
Start: 2023-03-03 | End: 2023-03-15 | Stop reason: ALTCHOICE

## 2023-03-03 RX ORDER — DOXYCYCLINE HYCLATE 100 MG
100 TABLET ORAL 2 TIMES DAILY
Qty: 42 TABLET | Refills: 0 | Status: SHIPPED | OUTPATIENT
Start: 2023-03-03 | End: 2023-03-24

## 2023-03-03 RX ORDER — AMOXICILLIN 250 MG
1 CAPSULE ORAL DAILY
Qty: 30 TABLET | Refills: 1 | Status: SHIPPED | OUTPATIENT
Start: 2023-03-03 | End: 2023-10-06 | Stop reason: CLARIF

## 2023-03-03 RX ORDER — IBUPROFEN 200 MG
1 TABLET ORAL DAILY
Qty: 28 PATCH | Refills: 0 | Status: SHIPPED | OUTPATIENT
Start: 2023-03-03 | End: 2023-10-06 | Stop reason: CLARIF

## 2023-03-03 RX ORDER — CIPROFLOXACIN 750 MG/1
750 TABLET, FILM COATED ORAL EVERY 12 HOURS
Qty: 42 TABLET | Refills: 0 | Status: SHIPPED | OUTPATIENT
Start: 2023-03-03 | End: 2023-03-24

## 2023-03-03 RX ADMIN — Medication 3 ML: at 03:03

## 2023-03-03 RX ADMIN — INSULIN ASPART 5 UNITS: 100 INJECTION, SOLUTION INTRAVENOUS; SUBCUTANEOUS at 08:03

## 2023-03-03 RX ADMIN — HYDROCODONE BITARTRATE AND ACETAMINOPHEN 1 TABLET: 5; 325 TABLET ORAL at 04:03

## 2023-03-03 RX ADMIN — INSULIN ASPART 4 UNITS: 100 INJECTION, SOLUTION INTRAVENOUS; SUBCUTANEOUS at 08:03

## 2023-03-03 RX ADMIN — INSULIN ASPART 5 UNITS: 100 INJECTION, SOLUTION INTRAVENOUS; SUBCUTANEOUS at 12:03

## 2023-03-03 RX ADMIN — INSULIN ASPART 8 UNITS: 100 INJECTION, SOLUTION INTRAVENOUS; SUBCUTANEOUS at 12:03

## 2023-03-03 RX ADMIN — HYDROCODONE BITARTRATE AND ACETAMINOPHEN 1 TABLET: 5; 325 TABLET ORAL at 08:03

## 2023-03-03 RX ADMIN — HYDROCODONE BITARTRATE AND ACETAMINOPHEN 1 TABLET: 5; 325 TABLET ORAL at 03:03

## 2023-03-03 RX ADMIN — LISINOPRIL 10 MG: 5 TABLET ORAL at 08:03

## 2023-03-03 RX ADMIN — HYDROMORPHONE HYDROCHLORIDE 0.5 MG: 1 INJECTION, SOLUTION INTRAMUSCULAR; INTRAVENOUS; SUBCUTANEOUS at 06:03

## 2023-03-03 RX ADMIN — HYDROCODONE BITARTRATE AND ACETAMINOPHEN 1 TABLET: 5; 325 TABLET ORAL at 12:03

## 2023-03-03 RX ADMIN — METOPROLOL TARTRATE 25 MG: 25 TABLET, FILM COATED ORAL at 08:03

## 2023-03-03 RX ADMIN — VANCOMYCIN HYDROCHLORIDE 1750 MG: 500 INJECTION, POWDER, LYOPHILIZED, FOR SOLUTION INTRAVENOUS at 03:03

## 2023-03-03 RX ADMIN — APIXABAN 5 MG: 5 TABLET, FILM COATED ORAL at 08:03

## 2023-03-03 RX ADMIN — DIVALPROEX SODIUM 500 MG: 250 TABLET, DELAYED RELEASE ORAL at 08:03

## 2023-03-03 RX ADMIN — POLYETHYLENE GLYCOL 3350 17 G: 17 POWDER, FOR SOLUTION ORAL at 08:03

## 2023-03-03 RX ADMIN — CEFEPIME HYDROCHLORIDE 2 G: 2 INJECTION, SOLUTION INTRAVENOUS at 06:03

## 2023-03-03 RX ADMIN — SENNOSIDES AND DOCUSATE SODIUM 2 TABLET: 50; 8.6 TABLET ORAL at 08:03

## 2023-03-03 NOTE — SUBJECTIVE & OBJECTIVE
Interval History: Patient still with pain in foot, otherwise doing well. Wound vac delivered. ID following, continuing IV abx pending final cultures     Review of Systems   Constitutional:  Negative for fever.   Respiratory:  Negative for shortness of breath.    Cardiovascular:  Negative for chest pain.   Skin:  Positive for wound.   Psychiatric/Behavioral:  Negative for confusion.    Objective:     Vital Signs (Most Recent):  Temp: 98 °F (36.7 °C) (03/02/23 2113)  Pulse: 65 (03/02/23 2113)  Resp: 19 (03/02/23 2113)  BP: (!) 160/74 (03/02/23 2113)  SpO2: 96 % (03/02/23 2113) Vital Signs (24h Range):  Temp:  [97.4 °F (36.3 °C)-98.4 °F (36.9 °C)] 98 °F (36.7 °C)  Pulse:  [64-75] 65  Resp:  [16-20] 19  SpO2:  [93 %-96 %] 96 %  BP: (117-173)/(58-74) 160/74     Weight: 104 kg (229 lb 4.5 oz)  Body mass index is 37.01 kg/m².    Intake/Output Summary (Last 24 hours) at 3/2/2023 2212  Last data filed at 3/2/2023 1747  Gross per 24 hour   Intake 720 ml   Output 1800 ml   Net -1080 ml        Physical Exam  Vitals reviewed.   Constitutional:       Appearance: Normal appearance.   HENT:      Head: Normocephalic and atraumatic.   Eyes:      General: No scleral icterus.     Conjunctiva/sclera: Conjunctivae normal.   Pulmonary:      Effort: Pulmonary effort is normal. No respiratory distress.   Musculoskeletal:      Comments: L foot in surgical boot   Skin:     Coloration: Skin is not jaundiced.      Findings: No erythema.   Psychiatric:         Mood and Affect: Mood normal.         Behavior: Behavior normal.       Significant Labs: All pertinent labs within the past 24 hours have been reviewed.    Significant Imaging: I have reviewed all pertinent imaging results/findings within the past 24 hours.

## 2023-03-03 NOTE — PROGRESS NOTES
"      [facility] Medicine  Telemedicine Progress Note    Patient Name: Audrey Natarajan  MRN: [id number]  Patient Class: IP- Inpatient   Admission Date: 2/23/2023  Length of Stay: 5 days  Attending Physician: Enoch Shaffer MD  Primary Care Provider: Donaldo Pena MD          Subjective:     Principal Problem:Osteomyelitis of left foot        HPI:  52 y.o. female with hypertension, hyperlipidemia, COPD, bipolar 1 disorder, DM2 presents with a complaint of foot pain.  Appears to be a chronic intermittent problem for some time, associated with erythema and edema of the extremity.  Was on IV vancomycin for awhile.  Has home health wound care.  Sees Podiatry Dr. De Los Santos and Infectious Disease Dr. Jarocho panchal.  Denies fever, chills, cough, SOB, chest pain, palpitations, dizziness, syncope, nausea, vomiting, diarrhea, abdominal pain.  In the ED, she was found to have leukocytosis with elevated lactic acid.  Inflammatory markers are also elevated.  X-ray of the foot an ultrasound of the veins of the lower extremity were unremarkable for acute abnormality.  Blood cultures were obtained and she was started on IV vancomycin in the ED.  Placed in observation.      Overview/Hospital Course:  Audrey Natarajan was placed under observation for management of left plantar foot ulceration, and need for further evaluation by podiatry and infectious disease.Podiatry evaluated the patient and conducted a bedside debridement.  Wound cultures Pseudomonas.  Options were discussed with the patient by Podiatry which included amputation versus IV antibiotics for 6 weeks.  MRI midfoot obtained which shows: "Similar appearance of extensive underlying Charcot changes at the midfoot and metatarsal bases with concern for osteomyelitis at the inferior cuboid." To OR on 2/28 for debridement and bone biopsy. Results pending. Started vanc/cefepime afterwards. ID and Podiatry following.        Interval History: Patient still " with pain in foot, otherwise doing well. Wound vac delivered. ID following, continuing IV abx pending final cultures     Review of Systems   Constitutional:  Negative for fever.   Respiratory:  Negative for shortness of breath.    Cardiovascular:  Negative for chest pain.   Skin:  Positive for wound.   Psychiatric/Behavioral:  Negative for confusion.    Objective:     Vital Signs (Most Recent):  Temp: 98 °F (36.7 °C) (03/02/23 2113)  Pulse: 65 (03/02/23 2113)  Resp: 19 (03/02/23 2113)  BP: (!) 160/74 (03/02/23 2113)  SpO2: 96 % (03/02/23 2113) Vital Signs (24h Range):  Temp:  [97.4 °F (36.3 °C)-98.4 °F (36.9 °C)] 98 °F (36.7 °C)  Pulse:  [64-75] 65  Resp:  [16-20] 19  SpO2:  [93 %-96 %] 96 %  BP: (117-173)/(58-74) 160/74     Weight: 104 kg (229 lb 4.5 oz)  Body mass index is 37.01 kg/m².    Intake/Output Summary (Last 24 hours) at 3/2/2023 2212  Last data filed at 3/2/2023 1747  Gross per 24 hour   Intake 720 ml   Output 1800 ml   Net -1080 ml        Physical Exam  Vitals reviewed.   Constitutional:       Appearance: Normal appearance.   HENT:      Head: Normocephalic and atraumatic.   Eyes:      General: No scleral icterus.     Conjunctiva/sclera: Conjunctivae normal.   Pulmonary:      Effort: Pulmonary effort is normal. No respiratory distress.   Musculoskeletal:      Comments: L foot in surgical boot   Skin:     Coloration: Skin is not jaundiced.      Findings: No erythema.   Psychiatric:         Mood and Affect: Mood normal.         Behavior: Behavior normal.       Significant Labs: All pertinent labs within the past 24 hours have been reviewed.    Significant Imaging: I have reviewed all pertinent imaging results/findings within the past 24 hours.      Assessment/Plan:      * Osteomyelitis of left foot  Erythema, edema, with worsening pain.     - wound culture Pseudomonas   - Podiatry and ID consulted.  - to OR 2/28 for debridement. Culture pending  - ID consulted- started vanc and cefepime post operatively   -  anticipate PICC and need for outpatient antibiotics/wound care  - PT, OT today- no weight bearing L foot       Charcot's joint of foot  Noted risk factor for wounds      SHAWN (obstructive sleep apnea)  CPAP QHS    Type 2 diabetes mellitus  Patient's FSGs are controlled on current medication regimen.  Last A1c reviewed-   Lab Results   Component Value Date    HGBA1C 7.1 (H) 12/22/2022     Most recent fingerstick glucose reviewed-   Recent Labs   Lab 03/02/23  0710 03/02/23  1100 03/02/23  1600 03/02/23  2110   POCTGLUCOSE 228* 193* 308* 250*     Current correctional scale  Medium  Increase anti-hyperglycemic dose as follows-   Antihyperglycemics (From admission, onward)    Start     Stop Route Frequency Ordered    03/01/23 2100  insulin detemir U-100 pen 20 Units         -- SubQ Nightly 03/01/23 1627    03/01/23 0745  insulin aspart U-100 pen 5 Units         -- SubQ 3 times daily with meals 03/01/23 0645    02/23/23 2328  insulin aspart U-100 pen 1-10 Units         -- SubQ Before meals & nightly PRN 02/23/23 2229      Diabetic Diet  Hold Oral hypoglycemics while patient is in the hospital.    Class 2 severe obesity with serious comorbidity and body mass index (BMI) of 37.0 to 37.9 in adult  Body mass index is 37.01 kg/m². Morbid obesity complicates all aspects of disease management from diagnostic modalities to treatment. Weight loss encouraged and health benefits explained to patient.         Bipolar 1 disorder  Continue home regimen of risperidone    History of pulmonary embolus (PE)  History of PE/DVT- resumed eliquis      History of DVT (deep vein thrombosis)  Resumed eliquis    Tobacco abuse  5 minutes spent counseling the patient on smoking cessation and she is not currently ready to stop smoking. She will be monitored for withdrawal.  She will be provided with additional smoking cessation counseling prior to discharge.    Hyperlipidemia  No recent lipid panel to review- repeat by PCP as outpatient  Continue  statin    COPD (chronic obstructive pulmonary disease)  No signs of acute exacerbation, stable on room air  P.r.n. nebs    Essential hypertension  BP is controlled  Continue home lisinopril and metoprolol  Bumex held- may need to resume at some point but no edema currently       VTE Risk Mitigation (From admission, onward)         Ordered     apixaban tablet 5 mg  2 times daily         03/01/23 0804     IP VTE HIGH RISK PATIENT  Once         02/23/23 2229     Place sequential compression device  Until discontinued         02/23/23 2229                      I have completed this tele-visit without the assistance of a telepresenter.    The attending portion of this evaluation, treatment, and documentation was performed per Enoch Shaffer MD via Telemedicine AudioVisual using the secure Loom software platform with 2 way audio/video. The provider was located off-site and the patient is located in the hospital. The aforementioned video software was utilized to document the relevant history and physical exam    Enoch Shaffer MD  Department of Hospital Medicine   [facility]

## 2023-03-03 NOTE — PLAN OF CARE
In basket sent to PCP to scheduled Hos F/U. Patient has Medicaid.     PCP scheduled : 3/15/23 @ 1:20p

## 2023-03-03 NOTE — ASSESSMENT & PLAN NOTE
Patient's FSGs are controlled on current medication regimen.  Last A1c reviewed-   Lab Results   Component Value Date    HGBA1C 7.1 (H) 12/22/2022     Most recent fingerstick glucose reviewed-   Recent Labs   Lab 03/02/23  0710 03/02/23  1100 03/02/23  1600 03/02/23  2110   POCTGLUCOSE 228* 193* 308* 250*     Current correctional scale  Medium  Increase anti-hyperglycemic dose as follows-   Antihyperglycemics (From admission, onward)    Start     Stop Route Frequency Ordered    03/01/23 2100  insulin detemir U-100 pen 20 Units         -- SubQ Nightly 03/01/23 1627    03/01/23 0745  insulin aspart U-100 pen 5 Units         -- SubQ 3 times daily with meals 03/01/23 0645    02/23/23 2328  insulin aspart U-100 pen 1-10 Units         -- SubQ Before meals & nightly PRN 02/23/23 2229      Diabetic Diet  Hold Oral hypoglycemics while patient is in the hospital.

## 2023-03-03 NOTE — SUBJECTIVE & OBJECTIVE
Interval history: NAEO. Tolerating antibiotics. Denies new complaints    Day 9 vancomycin. Day 4 cefepime    Last ekg-    QTc Int : 439 ms  2/27/23      Cancellous bone and fibrous tissue) submitted as left trocar biopsy cuboid):   -No inflammatory infiltrates       Component Ref Range & Units 8 d ago  (2/23/23) 1 mo ago  (1/9/23) 1 mo ago  (1/6/23) 2 mo ago  (12/22/22) 5 mo ago  (9/7/22) 10 mo ago  (5/4/22) 10 mo ago  (4/18/22)   CRP 0.0 - 8.2 mg/L 10.1 High   38.3 High   61.1 High   74.0 High            Wound swab 2/24  Pseudomonas aeruginosa       CULTURE, AEROBIC  (SPECIFY SOURCE)     Amikacin <=16 mcg/mL Sensitive     Cefepime 4 mcg/mL Sensitive     Ciprofloxacin <=1 mcg/mL Sensitive     Gentamicin 8 mcg/mL Intermediate     Levofloxacin <=2 mcg/mL Sensitive     Meropenem <=1 mcg/mL Sensitive     Minocycline N/R mcg/mL      Piperacillin/Tazo <=16 mcg/mL Sensitive     Tobramycin <=4 mcg/mL Sensitive        1/9 wound swab  Aerobic Bacterial Culture  Abnormal   METHICILLIN RESISTANT STAPHYLOCOCCUS AUREUS   Moderate     Resulting Agency [facility]        Susceptibility     Methicillin resistant Staphylococcus aureus     CULTURE, AEROBIC  (SPECIFY SOURCE)     Clindamycin >4 mcg/mL Resistant     Erythromycin >4 mcg/mL Resistant     Oxacillin >2 mcg/mL Resistant     Penicillin 2 mcg/mL Resistant     Tetracycline >8 mcg/mL Resistant     Trimeth/Sulfa <=0.5/9.5 m... Sensitive     Vancomycin 2 mcg/mL Sensitive                     Past Surgical History:   Procedure Laterality Date    ABDOMINAL SURGERY  2010    gastric sleeve    BILATERAL OOPHORECTOMY Bilateral 1/12/2015    CHOLECYSTECTOMY      DEBRIDEMENT OF FOOT Bilateral 5/10/2022    Procedure: DEBRIDEMENT, FOOT;  Surgeon: Maira De Los Santos DPM;  Location: [facility] OR;  Service: Podiatry;  Laterality: Bilateral;    DEBRIDEMENT OF FOOT Left 2/28/2023    Procedure: DEBRIDEMENT, FOOT,biopsy;  Surgeon: Maira De Los Santos DPM;  Location: [facility] OR;  Service: Podiatry;  Laterality: Left;   "request misonix, wound VAC, possible neoxx    Green' s filter Right 7/4/2012    Right Neck & Tunneled Down.    HERNIA REPAIR      "[place] [facility] around th [place].", pt. states    INCISION AND DRAINAGE FOOT Left 12/24/2022    Procedure: INCISION AND DRAINAGE, FOOT;  Surgeon: Fahad Razo DPM;  Location: [facility] OR;  Service: Podiatry;  Laterality: Left;    LAPAROSCOPIC CHOLECYSTECTOMY N/A 9/10/2020    Procedure: CHOLECYSTECTOMY, LAPAROSCOPIC;  Surgeon: Montrell Gutierrez MD;  Location: [facility] OR;  Service: General;  Laterality: N/A;  RN PREOP 9/9----COVID Negative  9/9    OVARIAN CYST REMOVAL  3/13/2014    [place] REMOVAL OF OVARY/TUBE(S)      SPLENECTOMY, TOTAL  July 2003    TONSILLECTOMY      as a child    TYMPANOSTOMY TUBE PLACEMENT  1976    VEIN SURGERY  2003    Lt leg       Review of patient's allergies indicates:   Allergen Reactions    Morphine Other (See Comments)     Patient had a psychotic episode after taking Morphine  Agitation, hallucinations    Penicillins Anaphylaxis     Tolerated cephalosporins in the past    Januvia [sitagliptin] Hives    Carbamazepine Other (See Comments)     hyponatremia       No current facility-administered medications on file prior to encounter.     Current Outpatient Medications on File Prior to Encounter   Medication Sig    acetaminophen (TYLENOL) 500 MG tablet Take 2 tablets (1,000 mg total) by mouth every 6 (six) hours as needed for Pain.    albuterol (PROVENTIL/VENTOLIN HFA) 90 mcg/actuation inhaler INHALE 2 PUFFS INTO THE LUNGS EVERY 6 HOURS AS NEEDED FOR WHEEZING. RESCUE    apixaban (ELIQUIS) 5 mg Tab Take 1 tablet (5 mg total) by mouth 2 (two) times daily.    aspirin 81 MG Chew Take 1 tablet (81 mg total) by mouth once daily.    bumetanide (BUMEX) 1 MG tablet Take 1 tablet (1 mg total) by mouth once daily.    divalproex (DEPAKOTE) 500 MG TbEC Take 1 tablet (500 mg total) by mouth once daily. PO QAM    ferrous sulfate 325 (65 FE) MG EC tablet Take 1 tablet (325 mg " total) by mouth once daily.    hydrOXYzine (ATARAX) 50 MG tablet Take 0.5 tablets (25 mg total) by mouth 4 (four) times daily as needed for Itching or Anxiety.    lisinopriL 10 MG tablet Take 1 tablet (10 mg total) by mouth once daily.    loratadine (CLARITIN) 10 mg tablet Take 1 tablet (10 mg total) by mouth once daily.    metFORMIN (GLUCOPHAGE) 1000 MG tablet Take 1 tablet (1,000 mg total) by mouth 2 (two) times daily with meals.    metoprolol tartrate (LOPRESSOR) 25 MG tablet Take 1 tablet (25 mg total) by mouth 2 (two) times daily.    multivitamin Tab Take 1 tablet by mouth once daily.    pantoprazole (PROTONIX) 40 MG tablet Take 1 tablet (40 mg total) by mouth once daily.    pravastatin (PRAVACHOL) 40 MG tablet Take 1 tablet (40 mg total) by mouth every evening.    risperiDONE (RISPERDAL M-TABS) 3 MG disintegrating tablet Take 1 tablet (3 mg total) by mouth 2 (two) times daily. (Patient taking differently: Take 3 mg by mouth nightly.)    VYVANSE 40 mg Cap Take 40 mg by mouth once daily.    albuterol-ipratropium (DUO-NEB) 2.5 mg-0.5 mg/3 mL nebulizer solution Take 3 mLs by nebulization every 6 (six) hours as needed for Wheezing or Shortness of Breath. Rescue    ammonium lactate (LAC-HYDRIN) 12 % lotion APPL Y ONCE TOPICALLY TWICE DAILY FOR 30 DAYS    DUPIXENT  mg/2 mL PnIj Inject into the skin.    fluticasone propionate (FLONASE) 50 mcg/actuation nasal spray 2 sprays (100 mcg total) by Each Nostril route daily as needed (Nasal congestion).    fluticasone-salmeterol diskus inhaler 250-50 mcg Inhale 1 puff into the lungs 2 (two) times daily. Controller    insulin detemir U-100 (LEVEMIR FLEXTOUCH) 100 unit/mL (3 mL) SubQ InPn pen Inject 22 Units into the skin every evening.    LIDOcaine (LIDODERM) 5 % Place 1 patch onto the skin once daily. Remove & Discard patch within 12 hours or as directed by MD    magnesium hydroxide 400 mg/5 ml (MILK OF MAGNESIA) 400 mg/5 mL Susp Take 30 mLs (2,400 mg total) by mouth  daily as needed.    methocarbamoL (ROBAXIN) 500 MG Tab Take 500 mg by mouth. Frequency could not be confirmed.    nystatin (NYSTOP) powder APPLY TO ABDOMINAL AND BREAST SKIN FOLD TWICE DAILY.    oxyCODONE-acetaminophen (PERCOCET)  mg per tablet Take 1 tablet by mouth every 6 (six) hours as needed for Pain.    polyethylene glycol (GLYCOLAX) 17 gram PwPk Take 17 g by mouth once daily.    senna-docusate 8.6-50 mg (PERICOLACE) 8.6-50 mg per tablet Take 1 tablet by mouth 2 (two) times daily.    [DISCONTINUED] diclofenac sodium (VOLTAREN) 1 % Gel Apply 2 g topically 4 (four) times daily as needed (Apply to painful area up to 4 times a day as needed for pain). Apply to painful area 4 times a day as needed for pain    [DISCONTINUED] furosemide (LASIX) 20 MG tablet TAKE 1 TABLET(20 MG) BY MOUTH EVERY DAY    [DISCONTINUED] QUEtiapine (SEROQUEL) 200 MG Tab Take 1 tablet (200 mg total) by mouth before breakfast.     Family History       Problem Relation (Age of Onset)    Cataracts Father    Diabetes Father, Paternal Grandfather    Heart disease Father, Paternal Grandfather    Hypertension Father    No Known Problems Mother, Sister, Brother, Maternal Aunt, Maternal Uncle, Paternal Aunt, Paternal Uncle, Maternal Grandfather    Ovarian cancer Maternal Grandmother, Paternal Grandmother          Tobacco Use    Smoking status: Every Day     Packs/day: 1.00     Years: 37.00     Pack years: 37.00     Types: Cigarettes     Last attempt to quit: 2020     Years since quittin.2    Smokeless tobacco: Current    Tobacco comments:     Enrolled in the [employer] on 5/3/14 ([facility] Member ID # [id number]). Ambulatory referral to Smoking Cessation Program   Substance and Sexual Activity    Alcohol use: No     Alcohol/week: 0.0 standard drinks    Drug use: No    Sexual activity: Yes     Partners: Male     Review of Systems   Constitutional:  Negative for chills, fatigue and fever.   HENT:  Negative for congestion and rhinorrhea.     Eyes:  Negative for photophobia and visual disturbance.   Respiratory:  Negative for cough and shortness of breath.    Cardiovascular:  Positive for leg swelling. Negative for chest pain and palpitations.   Gastrointestinal:  Negative for abdominal pain, diarrhea, nausea and vomiting.   Genitourinary:  Negative for dysuria, frequency and urgency.   Musculoskeletal:  Positive for arthralgias and myalgias.   Skin:  Positive for color change and wound. Negative for pallor and rash.   Neurological:  Negative for light-headedness and headaches.   Psychiatric/Behavioral:  Negative for confusion and decreased concentration.    Objective:     Vital Signs (Most Recent):  Temp: 98 °F (36.7 °C) (03/03/23 0708)  Pulse: 65 (03/03/23 0708)  Resp: 17 (03/03/23 0815)  BP: (!) 148/64 (03/03/23 0708)  SpO2: 95 % (03/03/23 0708)   Vital Signs (24h Range):  Temp:  [98 °F (36.7 °C)-98.4 °F (36.9 °C)] 98 °F (36.7 °C)  Pulse:  [65-69] 65  Resp:  [16-20] 17  SpO2:  [93 %-97 %] 95 %  BP: (131-160)/(61-76) 148/64     Weight: 104 kg (229 lb 4.5 oz)  Body mass index is 37.01 kg/m².    Physical Exam  Vitals and nursing note reviewed.   Constitutional:       General: She is not in acute distress.     Appearance: She is well-developed.   HENT:      Head: Normocephalic and atraumatic.      Right Ear: External ear normal.      Left Ear: External ear normal.      Nose: Nose normal.   Eyes:      Conjunctiva/sclera: Conjunctivae normal.      Pupils: Pupils are equal, round, and reactive to light.   Cardiovascular:      Rate and Rhythm: Normal rate and regular rhythm.   Pulmonary:      Effort: Pulmonary effort is normal. No respiratory distress.      Breath sounds: Normal breath sounds. No wheezing.   Abdominal:      General: Bowel sounds are normal. There is no distension.      Palpations: Abdomen is soft.      Tenderness: There is no abdominal tenderness.      Comments: No palpable hepatomegaly or splenomegaly    Musculoskeletal:         General:  No tenderness. Normal range of motion.      Cervical back: Normal range of motion and neck supple.      Comments: Dressing c/d/i   Skin:     General: Skin is warm and dry.   Neurological:      Mental Status: She is alert and oriented to person, place, and time.   Psychiatric:         Thought Content: Thought content normal.         CRANIAL NERVES     CN III, IV, VI   Pupils are equal, round, and reactive to light.     Significant Labs: All pertinent labs within the past 24 hours have been reviewed.    Significant Imaging: I have reviewed all pertinent imaging results/findings within the past 24 hours.

## 2023-03-03 NOTE — PLAN OF CARE
[facility], left a [phone] to call patient with an appt per Dr. De Los Santos's note.  TN requested that Dr. Montano place an order for outpt wound care.

## 2023-03-03 NOTE — TELEPHONE ENCOUNTER
----- Message from Mayrashailesh Jarrett sent at 3/3/2023  1:47 PM CST -----  Regarding: Hosp F/U  Good afternoon     Patient needs a Hosp F/U. Could you assist with scheduling please.    Thank you!

## 2023-03-03 NOTE — PROGRESS NOTES
"[facility] Surg  Podiatry  Progress Note    Patient Name: Audrey Natarajan  MRN: [id number]  Admission Date: 2/23/2023  Hospital Length of Stay: 6 days  Attending Physician: Mariam Montano MD  Primary Care Provider: Donaldo Pena MD     Subjective:     History of Present Illness: 51 y/o female PMH DM2, Charcot left foot admitted for left foot infection. Patient recently admitted for left foot infection in Dec. 2022 and Jan 2023. Patient discharged to LTAC for IV abx, wound care completed stay at LTAC on 2/7/23. Recently saw ID on 2/8/23 then 2/22/2(note pending completion). On ID note from 2/8/23 recommendation was to "continue to monitor off antibiotics". Patient also had HH for dressing change. Patient reports increased pain LLE in the last day prompting her to report to ED.     Interval History:     2/27/23: Patient seen bedside. Bandage intact left foot.     03/03/2023 patient seen at bedside.  On removal of boot strike through noted.  VAC reads seal intact.  On removal of outer dressing VAC dressing and graft shifted and not on wound bed, macerated. Patient denies ambulation or standing. She relates some aching to limb.  She relates some nausea that she believes is secondary to abx    Follow-up For: Procedure(s) (LRB):  DEBRIDEMENT, FOOT,biopsy (Left)    Post-Operative Day: 3 Days Post-Op    Scheduled Meds:   apixaban  5 mg Oral BID    ceFEPime (MAXIPIME) IVPB  2 g Intravenous Q8H    divalproex  500 mg Oral Daily    insulin aspart U-100  5 Units Subcutaneous TIDWM    insulin detemir U-100  20 Units Subcutaneous QHS    lisinopriL  10 mg Oral Daily    metoprolol tartrate  25 mg Oral BID    miconazole  1 applicator Vaginal QHS    polyethylene glycol  17 g Oral Daily    pravastatin  40 mg Oral QHS    risperidone 1 mg/ml  3 mg Oral QHS    senna-docusate 8.6-50 mg  2 tablet Oral BID    vancomycin (VANCOCIN) IVPB  1,750 mg Intravenous Q12H     Continuous Infusions:  PRN Meds:acetaminophen, " aluminum-magnesium hydroxide-simethicone, cadexomer iodine, dextrose 10%, dextrose 10%, dextrose, dextrose, glucagon (human recombinant), HYDROcodone-acetaminophen, HYDROmorphone, insulin aspart U-100, melatonin, naloxone, ondansetron, prochlorperazine, simethicone, sodium chloride 0.9%, Pharmacy to dose Vancomycin consult **AND** vancomycin - pharmacy to dose    Review of Systems   Constitutional:  Positive for activity change, appetite change and fatigue. Negative for chills and fever.   Respiratory:  Negative for cough and shortness of breath.    Cardiovascular:  Positive for leg swelling. Negative for chest pain.   Gastrointestinal:  Positive for nausea. Negative for diarrhea and vomiting.   Musculoskeletal:  Positive for arthralgias and myalgias.   Skin:  Positive for wound.   Neurological:  Positive for numbness. Negative for weakness.        + paresthesia    Objective:     Vital Signs (Most Recent):  Temp: 98 °F (36.7 °C) (03/03/23 0708)  Pulse: 65 (03/03/23 0708)  Resp: 17 (03/03/23 0815)  BP: (!) 148/64 (03/03/23 0708)  SpO2: 95 % (03/03/23 0708)   Vital Signs (24h Range):  Temp:  [98 °F (36.7 °C)-98.1 °F (36.7 °C)] 98 °F (36.7 °C)  Pulse:  [65-69] 65  Resp:  [16-20] 17  SpO2:  [93 %-97 %] 95 %  BP: (131-160)/(61-76) 148/64     Weight: 104 kg (229 lb 4.5 oz)  Body mass index is 37.01 kg/m².      Foot Exam    General  Orientation: alert and oriented to person, place, and time       Right Foot/Ankle     Neurovascular  Dorsalis pedis: 1+  Posterior tibial: 1+      Left Foot/Ankle      Inspection and Palpation  Skin Exam: ulcer;     Neurovascular  Dorsalis pedis: 1+  Posterior tibial: 1+    03/03/2023  Emeli wound and medial foot maceration, graft shifted                  2/27/23:        2/24/23:  Wound 1: left plantar foot ulceration.   Base: fibrogranular wound base   Periwound skin: HPK  Drainage: serous   Probe: probe to bone.       Pre debridement       Post debridement       Laboratory:  A1C:   Recent Labs    Lab 09/08/22  0546 09/20/22  0957 12/22/22  2322   HGBA1C 9.1* 8.6* 7.1*     CBC:   Recent Labs   Lab 03/01/23  0358   WBC 11.00   RBC 4.41   HGB 10.3*   HCT 33.7*   *   MCV 76*   MCH 23.4*   MCHC 30.6*     CMP:   Recent Labs   Lab 03/03/23  0339   *   CALCIUM 9.8   ALBUMIN 3.0*   PROT 6.5      K 4.9   CO2 30*      BUN 10   CREATININE 0.7   ALKPHOS 97   ALT 34   AST 15   BILITOT 0.3     CRP: No results for input(s): CRP in the last 168 hours.  ESR: No results for input(s): SEDRATE in the last 168 hours.  Microbiology Results (last 7 days)       Procedure Component Value Units Date/Time    Aerobic culture [[id number]] Collected: 02/28/23 1401    Order Status: Completed Specimen: Biopsy from Foot, Left Updated: 03/03/23 0752     Aerobic Bacterial Culture No growth    Narrative:      Left Trocar Biopsy Cuboid    AFB Culture & Smear [[id number]] Collected: 02/28/23 1401    Order Status: Completed Specimen: Biopsy from Foot, Left Updated: 03/02/23 2127     AFB Culture & Smear Culture in progress     AFB CULTURE STAIN No acid fast bacilli seen.    Narrative:      Left Trocar Biopsy Cuboid    Culture, Anaerobe [[id number]] Collected: 02/28/23 1401    Order Status: Completed Specimen: Biopsy from Foot, Left Updated: 03/02/23 0844     Anaerobic Culture Culture in progress    Narrative:      Left Trocar Biopsy Cuboid    Gram stain [[id number]] Collected: 02/28/23 1401    Order Status: Completed Specimen: Biopsy from Foot, Left Updated: 03/01/23 1042     Gram Stain Result No WBC's      No organisms seen    Narrative:      Left Trocar Biopsy Cuboid    Fungus culture [[id number]] Collected: 02/28/23 1401    Order Status: Sent Specimen: Biopsy from Foot, Left Updated: 02/28/23 1634    Culture, Anaerobe [[id number]] Collected: 02/24/23 0936    Order Status: Completed Specimen: Wound from Foot, Left Updated: 02/28/23 0838     Anaerobic Culture No anaerobes isolated    Blood Culture #2 **CANNOT BE ORDERED  STAT** [[id number]] Collected: 02/23/23 1558    Order Status: Completed Specimen: Blood from Peripheral, Antecubital, Right Updated: 02/27/23 1703     Blood Culture, Routine No Growth after 4 days.    Blood Culture #1 **CANNOT BE ORDERED STAT** [[id number]] Collected: 02/23/23 1614    Order Status: Completed Specimen: Blood from Peripheral, Antecubital, Left Updated: 02/27/23 1703     Blood Culture, Routine No Growth after 4 days.    Aerobic culture [[id number]]  (Abnormal)  (Susceptibility) Collected: 02/24/23 0936    Order Status: Completed Specimen: Wound from Foot, Left Updated: 02/26/23 0603     Aerobic Bacterial Culture PSEUDOMONAS AERUGINOSA  Rare      Gram stain [[id number]] Collected: 02/24/23 0936    Order Status: Completed Specimen: Wound from Foot, Left Updated: 02/24/23 1226     Gram Stain Result No WBC's      No organisms seen            Diagnostic Results:  Imaging Results              X-Ray Chest 1 View (Final result)  Result time 02/23/23 18:08:09      Final result by Shalom Bro MD (02/23/23 18:08:09)                   Impression:      No acute cardiopulmonary process identified.      Electronically signed by: Shalom Bro MD  Date:    02/23/2023  Time:    18:08               Narrative:    EXAMINATION:  XR CHEST 1 VIEW    CLINICAL HISTORY:  Systemic inflammatory response syndrome (sirs) of non-infectious origin without acute organ dysfunction    TECHNIQUE:  Single frontal view of the chest was performed.    COMPARISON:  01/11/2023.    FINDINGS:  Cardiac silhouette is normal in size.  Lungs are symmetrically expanded.  No evidence of focal consolidative process, pneumothorax, or significant pleural effusion.  No acute osseous abnormality identified.                                       US Lower Extremity Veins Left (Final result)  Result time 02/23/23 17:03:35      Final result by Shalom Bro MD (02/23/23 17:03:35)                   Impression:      No evidence of left lower extremity  deep venous thrombosis.      Electronically signed by: Shalom Bro MD  Date:    02/23/2023  Time:    17:03               Narrative:    EXAMINATION:  US LOWER EXTREMITY VEINS LEFT    CLINICAL HISTORY:  Other specified soft tissue disorders    TECHNIQUE:  Duplex and color flow Doppler evaluation of the left lower extremity veins was performed.    COMPARISON:  01/09/2023.    FINDINGS:  No evidence of clot involving the bilateral common femoral veins or left greater saphenous, femoral, popliteal, peroneal, anterior and posterior tibial veins.  All venous structures demonstrate normal respiratory phasicity and augment adequately.  No evidence of soft tissue mass or Baker's cyst.                                       X-Ray Foot Complete Left (Final result)  Result time 02/23/23 16:34:20      Final result by Tu Santos Jr., MD (02/23/23 16:34:20)                   Impression:      No radiographic evidence of osteomyelitis      Electronically signed by: Tu Restrepo Jr  Date:    02/23/2023  Time:    16:34               Narrative:    EXAMINATION:  XR FOOT COMPLETE 3 VIEW LEFT    CLINICAL HISTORY:  Type 2 diabetes mellitus with foot ulcer    TECHNIQUE:  XR FOOT COMPLETE 3 VIEW LEFT    COMPARISON:  01/09/2023    FINDINGS:  Large area of soft tissue ulceration is seen involving the plantar aspect of the midfoot with Charcot neuropathic changes seen throughout the midfoot.  No gross bone erosion, destruction, or aggressive periosteal reaction.  Nonspecific soft tissue swelling of the foot.                                      Assessment/Plan:     Active Diagnoses:    Diagnosis Date Noted POA    PRINCIPAL PROBLEM:  Osteomyelitis of left foot [M86.9] 02/10/2021 Yes    Charcot's joint of foot [M14.679]  Yes    SHAWN (obstructive sleep apnea) [G47.33] 02/09/2022 Yes    Type 2 diabetes mellitus [E11.9] 09/26/2016 Yes     Chronic    Class 2 severe obesity with serious comorbidity and body mass index (BMI) of 37.0 to  37.9 in adult [E66.01, Z68.37] 08/10/2016 Not Applicable    Bipolar 1 disorder [F31.9] 04/13/2015 Yes     Chronic    History of DVT (deep vein thrombosis) [Z86.718] 04/13/2015 Not Applicable     Chronic    History of pulmonary embolus (PE) [Z86.711] 04/13/2015 Yes     Chronic    Tobacco abuse [Z72.0] 03/06/2014 Yes     Chronic    Hyperlipidemia [E78.5] 02/13/2014 Yes     Chronic    COPD (chronic obstructive pulmonary disease) [J44.9] 10/11/2013 Yes     Chronic    Essential hypertension [I10] 06/06/2013 Yes     Chronic      Problems Resolved During this Admission:       S/p wound debridement with trocar bone biopsy and VAC application    VAC malfunction. No alarm or seal break indication despite dressing not being intact.    Switched to Home VAC in case discharge planned and due to question about current in room VAC.    Culture and path results returned.  Keep in mind this is from trocar biopsy and therefore does not give complete picture of cuboid in it entirety    Education about the prevention of limb loss.    Discussed wound healing cycle, skin integrity, ways to care for skin.    Short-term goals include maintaining good offloading and minimizing bioburden, promoting granulation and epithelialization to healing.  Long-term goals include keeping the wound healed by good offloading and medical management under the direction of internist.    Patient will require MWF VAC changes, can be done by home health    She will need WOUND CLINIC follow up, not podiatry clinic, within 7 days of discharge with first available provider.          Maira De Los Santos DPM  Podiatry  [facility] [place] - Med Surg

## 2023-03-03 NOTE — CONSULTS
"[facility] Med Surg  Infectious Disease  Consult Note    Patient Name: Audrey Natarajan  MRN: [id number]  Admission Date: 2/23/2023  Hospital Length of Stay: 6 days  Attending Physician: Mariam Montano MD  Primary Care Provider: Donaldo Pena MD     Isolation Status: No active isolations    Patient information was obtained from patient and ER records.      Consults  Assessment/Plan:     Orthopedic  Charcot's joint of foot  53y/o M with h/o DM with charcot foot, DVT on Eliquis, PAD, ongoing tobacco abuse admitted 2/23 with chronic L foot wound and worsening pain/swelling. Notably just completed 4 weeks vancomycin at [facility] for possible (note prior bone biopsy path/cultures 1/13 negative for OM). LLE Dvt studies negative. S/p podiatry debridement at bedside - gram stain negative, cultures rare growth of pseudomonas. Currently on vancomycin. ID consulted for "foot wound, leukocytosis, elevated lactic"    JEYSON with  hemodynamically significant stenosis in the left and DPA. Multifocal mild to moderate grade stenoses is suspected throughout the left MIRACLE and, bilateral posterior tibial and peroneal arteries.    Not clear that infection is playing role in chronic foot ulcer. 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 MRI evidence of osteo could be recently treated infection. Pathology again without evidence of osteomyeltis and bone cultures negative.  Likely poor wound healing from combination from  DM, ongoing tobacco abuse, PAD and psychosocial factors.    Recommendations:   -  agree with vanc/cefepime while inpatient. On d/c would transition to po cipro 750 bid/doxy 100 bid for another few weeks for SSTI, ultimate duration TBD based on wound healing/clinical improvement (continue until ID f/u appt)  - defer need for amputation to podiatry, but would likely be definitive treatment of infection  - counseled on need for tobacco cessation  - please " "ensure she has adequate perfusion to heal wounds    Discussed with patient side effects of quinolones (including but not limited to qtc prolongation, hypoglycemia, achilles tendon rupture, worsening of aneurysms, c.diff) and these are acceptable risks and preferable to IV medication and risk of picc line complications. Qtc = 439.Avoid milk products within 2 hours    Outpatient Infectious Diseases Follow-up     Follow-up appointment will be arranged by the ID clinic and will be found in the patient's appointments tab.     Prior to discharge, please ensure the patient's follow-up has been scheduled.     If there is still no follow-up scheduled prior to discharge, please send an EPIC message to Rosemary Balderas in Infectious Diseases.                       Thank you for your consult. I will sign off. Please contact us if you have any additional questions.    Beverly Zavala MD  Infectious Disease  [facility] - Med Surg    Subjective:     Principal Problem: Osteomyelitis of left foot    HPI:   51y/o M with h/o DM with charcot foot, DVT on Eliquis, PAD, ongoing tobacco abuse admitted 2/23 with chronic L foot wound and worsening pain/swelling. Notably just completed 4 weeks vancomycin at [facility] for possible (note prior bone biopsy path/cultures 1/13 negative for OM). Wound improved and filling in since completing vancomycin. Reports using scooter to keep weight off of foot. Going to wound care. Denies f/c.     S/p podiatry debridement at bedside today    Currently on vancomycin      1 mo ago    Final Pathologic Diagnosis Bone, left foot, biopsy:   Viable bone with osteonecrosis-no acute osteomyelitis identified     ID consulted for "foot wound, leukocytosis, elevated lactic"             Interval history: NAEO. Tolerating antibiotics. Denies new complaints    Day 9 vancomycin. Day 4 cefepime    Last ekg-    QTc Int : 439 ms  2/27/23      Cancellous bone and fibrous tissue) submitted as left trocar biopsy cuboid):   -No " "inflammatory infiltrates       Component Ref Range & Units 8 d ago  (2/23/23) 1 mo ago  (1/9/23) 1 mo ago  (1/6/23) 2 mo ago  (12/22/22) 5 mo ago  (9/7/22) 10 mo ago  (5/4/22) 10 mo ago  (4/18/22)   CRP 0.0 - 8.2 mg/L 10.1 High   38.3 High   61.1 High   74.0 High            Wound swab 2/24  Pseudomonas aeruginosa       CULTURE, AEROBIC  (SPECIFY SOURCE)     Amikacin <=16 mcg/mL Sensitive     Cefepime 4 mcg/mL Sensitive     Ciprofloxacin <=1 mcg/mL Sensitive     Gentamicin 8 mcg/mL Intermediate     Levofloxacin <=2 mcg/mL Sensitive     Meropenem <=1 mcg/mL Sensitive     Minocycline N/R mcg/mL      Piperacillin/Tazo <=16 mcg/mL Sensitive     Tobramycin <=4 mcg/mL Sensitive        1/9 wound swab  Aerobic Bacterial Culture  Abnormal   METHICILLIN RESISTANT STAPHYLOCOCCUS AUREUS   Moderate     Resulting Agency [facility]        Susceptibility     Methicillin resistant Staphylococcus aureus     CULTURE, AEROBIC  (SPECIFY SOURCE)     Clindamycin >4 mcg/mL Resistant     Erythromycin >4 mcg/mL Resistant     Oxacillin >2 mcg/mL Resistant     Penicillin 2 mcg/mL Resistant     Tetracycline >8 mcg/mL Resistant     Trimeth/Sulfa <=0.5/9.5 m... Sensitive     Vancomycin 2 mcg/mL Sensitive                     Past Surgical History:   Procedure Laterality Date    ABDOMINAL SURGERY  2010    gastric sleeve    BILATERAL OOPHORECTOMY Bilateral 1/12/2015    CHOLECYSTECTOMY      DEBRIDEMENT OF FOOT Bilateral 5/10/2022    Procedure: DEBRIDEMENT, FOOT;  Surgeon: Maira De Los Santos DPM;  Location: [facility] OR;  Service: Podiatry;  Laterality: Bilateral;    DEBRIDEMENT OF FOOT Left 2/28/2023    Procedure: DEBRIDEMENT, FOOT,biopsy;  Surgeon: Maira De Los Santos DPM;  Location: [facility] OR;  Service: Podiatry;  Laterality: Left;  request misonix, wound VAC, possible neoxx    Green' s filter Right 7/4/2012    Right Neck & Tunneled Down.    HERNIA REPAIR      "[place] [facility] [place] [facility] around th [place].", pt. states    INCISION AND DRAINAGE FOOT Left " 12/24/2022    Procedure: INCISION AND DRAINAGE, FOOT;  Surgeon: Fahad Razo DPM;  Location: [facility] OR;  Service: Podiatry;  Laterality: Left;    LAPAROSCOPIC CHOLECYSTECTOMY N/A 9/10/2020    Procedure: CHOLECYSTECTOMY, LAPAROSCOPIC;  Surgeon: Montrell Gutierrez MD;  Location: [facility] OR;  Service: General;  Laterality: N/A;  RN PREOP 9/9----COVID Negative  9/9    OVARIAN CYST REMOVAL  3/13/2014    IN REMOVAL OF OVARY/TUBE(S)      SPLENECTOMY, TOTAL  July 2003    TONSILLECTOMY      as a child    TYMPANOSTOMY TUBE PLACEMENT  1976    VEIN SURGERY  2003    Lt leg       Review of patient's allergies indicates:   Allergen Reactions    Morphine Other (See Comments)     Patient had a psychotic episode after taking Morphine  Agitation, hallucinations    Penicillins Anaphylaxis     Tolerated cephalosporins in the past    Januvia [sitagliptin] Hives    Carbamazepine Other (See Comments)     hyponatremia       No current facility-administered medications on file prior to encounter.     Current Outpatient Medications on File Prior to Encounter   Medication Sig    acetaminophen (TYLENOL) 500 MG tablet Take 2 tablets (1,000 mg total) by mouth every 6 (six) hours as needed for Pain.    albuterol (PROVENTIL/VENTOLIN HFA) 90 mcg/actuation inhaler INHALE 2 PUFFS INTO THE LUNGS EVERY 6 HOURS AS NEEDED FOR WHEEZING. RESCUE    apixaban (ELIQUIS) 5 mg Tab Take 1 tablet (5 mg total) by mouth 2 (two) times daily.    aspirin 81 MG Chew Take 1 tablet (81 mg total) by mouth once daily.    bumetanide (BUMEX) 1 MG tablet Take 1 tablet (1 mg total) by mouth once daily.    divalproex (DEPAKOTE) 500 MG TbEC Take 1 tablet (500 mg total) by mouth once daily. PO QAM    ferrous sulfate 325 (65 FE) MG EC tablet Take 1 tablet (325 mg total) by mouth once daily.    hydrOXYzine (ATARAX) 50 MG tablet Take 0.5 tablets (25 mg total) by mouth 4 (four) times daily as needed for Itching or Anxiety.    lisinopriL 10 MG tablet Take 1 tablet (10 mg  total) by mouth once daily.    loratadine (CLARITIN) 10 mg tablet Take 1 tablet (10 mg total) by mouth once daily.    metFORMIN (GLUCOPHAGE) 1000 MG tablet Take 1 tablet (1,000 mg total) by mouth 2 (two) times daily with meals.    metoprolol tartrate (LOPRESSOR) 25 MG tablet Take 1 tablet (25 mg total) by mouth 2 (two) times daily.    multivitamin Tab Take 1 tablet by mouth once daily.    pantoprazole (PROTONIX) 40 MG tablet Take 1 tablet (40 mg total) by mouth once daily.    pravastatin (PRAVACHOL) 40 MG tablet Take 1 tablet (40 mg total) by mouth every evening.    risperiDONE (RISPERDAL M-TABS) 3 MG disintegrating tablet Take 1 tablet (3 mg total) by mouth 2 (two) times daily. (Patient taking differently: Take 3 mg by mouth nightly.)    VYVANSE 40 mg Cap Take 40 mg by mouth once daily.    albuterol-ipratropium (DUO-NEB) 2.5 mg-0.5 mg/3 mL nebulizer solution Take 3 mLs by nebulization every 6 (six) hours as needed for Wheezing or Shortness of Breath. Rescue    ammonium lactate (LAC-HYDRIN) 12 % lotion APPL Y ONCE TOPICALLY TWICE DAILY FOR 30 DAYS    DUPIXENT  mg/2 mL PnIj Inject into the skin.    fluticasone propionate (FLONASE) 50 mcg/actuation nasal spray 2 sprays (100 mcg total) by Each Nostril route daily as needed (Nasal congestion).    fluticasone-salmeterol diskus inhaler 250-50 mcg Inhale 1 puff into the lungs 2 (two) times daily. Controller    insulin detemir U-100 (LEVEMIR FLEXTOUCH) 100 unit/mL (3 mL) SubQ InPn pen Inject 22 Units into the skin every evening.    LIDOcaine (LIDODERM) 5 % Place 1 patch onto the skin once daily. Remove & Discard patch within 12 hours or as directed by MD    magnesium hydroxide 400 mg/5 ml (MILK OF MAGNESIA) 400 mg/5 mL Susp Take 30 mLs (2,400 mg total) by mouth daily as needed.    methocarbamoL (ROBAXIN) 500 MG Tab Take 500 mg by mouth. Frequency could not be confirmed.    nystatin (NYSTOP) powder APPLY TO ABDOMINAL AND BREAST SKIN FOLD TWICE DAILY.     oxyCODONE-acetaminophen (PERCOCET)  mg per tablet Take 1 tablet by mouth every 6 (six) hours as needed for Pain.    polyethylene glycol (GLYCOLAX) 17 gram PwPk Take 17 g by mouth once daily.    senna-docusate 8.6-50 mg (PERICOLACE) 8.6-50 mg per tablet Take 1 tablet by mouth 2 (two) times daily.    [DISCONTINUED] diclofenac sodium (VOLTAREN) 1 % Gel Apply 2 g topically 4 (four) times daily as needed (Apply to painful area up to 4 times a day as needed for pain). Apply to painful area 4 times a day as needed for pain    [DISCONTINUED] furosemide (LASIX) 20 MG tablet TAKE 1 TABLET(20 MG) BY MOUTH EVERY DAY    [DISCONTINUED] QUEtiapine (SEROQUEL) 200 MG Tab Take 1 tablet (200 mg total) by mouth before breakfast.     Family History       Problem Relation (Age of Onset)    Cataracts Father    Diabetes Father, Paternal Grandfather    Heart disease Father, Paternal Grandfather    Hypertension Father    No Known Problems Mother, Sister, Brother, Maternal Aunt, Maternal Uncle, Paternal Aunt, Paternal Uncle, Maternal Grandfather    Ovarian cancer Maternal Grandmother, Paternal Grandmother          Tobacco Use    Smoking status: Every Day     Packs/day: 1.00     Years: 37.00     Pack years: 37.00     Types: Cigarettes     Last attempt to quit: 2020     Years since quittin.2    Smokeless tobacco: Current    Tobacco comments:     Enrolled in the [employer] on 5/3/14 ([facility] Member ID # [id number]). Ambulatory referral to Smoking Cessation Program   Substance and Sexual Activity    Alcohol use: No     Alcohol/week: 0.0 standard drinks    Drug use: No    Sexual activity: Yes     Partners: Male     Review of Systems   Constitutional:  Negative for chills, fatigue and fever.   HENT:  Negative for congestion and rhinorrhea.    Eyes:  Negative for photophobia and visual disturbance.   Respiratory:  Negative for cough and shortness of breath.    Cardiovascular:  Positive for leg swelling. Negative for  chest pain and palpitations.   Gastrointestinal:  Negative for abdominal pain, diarrhea, nausea and vomiting.   Genitourinary:  Negative for dysuria, frequency and urgency.   Musculoskeletal:  Positive for arthralgias and myalgias.   Skin:  Positive for color change and wound. Negative for pallor and rash.   Neurological:  Negative for light-headedness and headaches.   Psychiatric/Behavioral:  Negative for confusion and decreased concentration.    Objective:     Vital Signs (Most Recent):  Temp: 98 °F (36.7 °C) (03/03/23 0708)  Pulse: 65 (03/03/23 0708)  Resp: 17 (03/03/23 0815)  BP: (!) 148/64 (03/03/23 0708)  SpO2: 95 % (03/03/23 0708)   Vital Signs (24h Range):  Temp:  [98 °F (36.7 °C)-98.4 °F (36.9 °C)] 98 °F (36.7 °C)  Pulse:  [65-69] 65  Resp:  [16-20] 17  SpO2:  [93 %-97 %] 95 %  BP: (131-160)/(61-76) 148/64     Weight: 104 kg (229 lb 4.5 oz)  Body mass index is 37.01 kg/m².    Physical Exam  Vitals and nursing note reviewed.   Constitutional:       General: She is not in acute distress.     Appearance: She is well-developed.   HENT:      Head: Normocephalic and atraumatic.      Right Ear: External ear normal.      Left Ear: External ear normal.      Nose: Nose normal.   Eyes:      Conjunctiva/sclera: Conjunctivae normal.      Pupils: Pupils are equal, round, and reactive to light.   Cardiovascular:      Rate and Rhythm: Normal rate and regular rhythm.   Pulmonary:      Effort: Pulmonary effort is normal. No respiratory distress.      Breath sounds: Normal breath sounds. No wheezing.   Abdominal:      General: Bowel sounds are normal. There is no distension.      Palpations: Abdomen is soft.      Tenderness: There is no abdominal tenderness.      Comments: No palpable hepatomegaly or splenomegaly    Musculoskeletal:         General: No tenderness. Normal range of motion.      Cervical back: Normal range of motion and neck supple.      Comments: Dressing c/d/i   Skin:     General: Skin is warm and dry.    Neurological:      Mental Status: She is alert and oriented to person, place, and time.   Psychiatric:         Thought Content: Thought content normal.         CRANIAL NERVES     CN III, IV, VI   Pupils are equal, round, and reactive to light.     Significant Labs: All pertinent labs within the past 24 hours have been reviewed.    Significant Imaging: I have reviewed all pertinent imaging results/findings within the past 24 hours.

## 2023-03-03 NOTE — NURSING
AVS and prescriptions given and discussed with patient, including home medications, new prescriptions, follow up appts and home care. Pt verbalized understanding of all instructions. IV removed, tip intact, pressure dressing applied. Medications delivered to bedside. Escorted to parking area by transport for family/friend . No distress noted.

## 2023-03-03 NOTE — NURSING
Dr De Los Santos changed dressing at bedside and  transferred to home wound vac. [facility] hospital grade wound vac malfunctioning. [facility] called to discontinue service for equipment number [id number]. Cancellation confirmation number #[id number]

## 2023-03-03 NOTE — PLAN OF CARE
[place] - [facility] Surg    HOME HEALTH ORDERS  FACE TO FACE ENCOUNTER    Patient Name: Audrey Natarajan  YOB: 1970    PCP: Donaldo Pena MD   PCP Address: [address]  PCP Phone Number: [phone]  PCP Fax: [phone]    Encounter Date: 03/03/2023    Admit to Home Health    Diagnoses:  Active Hospital Problems    Diagnosis  POA    *Osteomyelitis of left foot [M86.9]  Yes    Charcot's joint of foot [M14.679]  Yes    SHAWN (obstructive sleep apnea) [G47.33]  Yes     -diagnosed many years ago.  Lost pap device from evacuation.  Record of prior sleep study not available.  Will set up with hsat.  Possible apap after sleep study      Type 2 diabetes mellitus [E11.9]  Yes     Chronic     Overview:   dx update      Class 2 severe obesity with serious comorbidity and body mass index (BMI) of 37.0 to 37.9 in adult [E66.01, Z68.37]  Not Applicable    Bipolar 1 disorder [F31.9]  Yes     Chronic    History of DVT (deep vein thrombosis) [Z86.718]  Not Applicable     Chronic    History of pulmonary embolus (PE) [Z86.711]  Yes     Chronic    Tobacco abuse [Z72.0]  Yes     Chronic    Hyperlipidemia [E78.5]  Yes     Chronic     Overview:   dx update    Formatting of this note might be different from the original.  dx update      COPD (chronic obstructive pulmonary disease) [J44.9]  Yes     Chronic    Essential hypertension [I10]  Yes     Chronic      Resolved Hospital Problems   No resolved problems to display.       Future Appointments   Date Time Provider Department Center   3/6/2023  9:00 AM VASCULAR ULTRASOUND, [facility] EKG [facility]   3/7/2023  1:45 PM Cali Dunn, PT [facility] OP [facility]   3/22/2023 11:30 AM INFECTIOUS DISEASE, [facility] INFECTD [facility]   4/4/2023  1:45 PM Cali Dunn, PT [facility] OP [facility] [place] - [facility]   4/5/2023  9:00 AM Donaldo Pena MD [facility] IM [facility]   8/8/2023 10:40 AM Octavio Talavera OD [facility]      Follow-up  Information       [facility] [place] Follow up.    Why: please call at time of discharge to schedule follow up appointment and establish care for future needs  Contact information:  [address] [place] [address]  [phone]                               I have seen and examined this patient face to face today. My clinical findings that support the need for the home health skilled services and home bound status are the following:  Weakness/numbness causing balance and gait disturbance due to Infection and Weakness/Debility making it taxing to leave home.    Allergies:  Review of patient's allergies indicates:   Allergen Reactions    Morphine Other (See Comments)     Patient had a psychotic episode after taking Morphine  Agitation, hallucinations    Penicillins Anaphylaxis     Tolerated cephalosporins in the past    Januvia [sitagliptin] Hives    Carbamazepine Other (See Comments)     hyponatremia       Diet: cardiac diet and diabetic diet: 2000 calorie    Activities: activity as tolerated    Nursing:   SN to complete comprehensive assessment including routine vital signs. Instruct on disease process and s/s of complications to report to MD. Review/verify medication list sent home with the patient at time of discharge  and instruct patient/caregiver as needed. Frequency may be adjusted depending on start of care date.    Notify MD if SBP > 160 or < 90; DBP > 90 or < 50; HR > 120 or < 50; Temp > 101;       CONSULTS:    Physical Therapy to evaluate and treat. Evaluate for home safety and equipment needs; Establish/upgrade home exercise program. Perform / instruct on therapeutic exercises, gait training, transfer training, and Range of Motion.  Occupational Therapy to evaluate and treat. Evaluate home environment for safety and equipment needs. Perform/Instruct on transfers, ADL training, ROM, and therapeutic exercises.  Aide to provide assistance with personal care, ADLs, and vital signs.    MISCELLANEOUS  CARE:  Routine Skin for Bedridden Patients: Instruct patient/caregiver to apply moisture barrier cream to all skin folds and wet areas in perineal area daily and after baths and all bowel movements.    WOUND CARE ORDERS  a. Ensure that canister is engaged into pump, replace canister weekly or when full   b. If leak alarm occurs, apply additional thin film at location of leak   c. Alarm troubleshooting contact [employer]. Phone # is [phone], Select option 5. [employer] representative is available 24 hours a day 7 days per week.   e. When therapy is discontinued or patient is discharged, please return non disposable NPWT unit per facility guidelines.  Negative pressure 125mmhg  Change wound vac dressing 3x/week. MWF VAC changes      Medications: Review discharge medications with patient and family and provide education.      Current Discharge Medication List        START taking these medications    Details   ciprofloxacin HCl (CIPRO) 750 MG tablet Take 1 tablet (750 mg total) by mouth every 12 (twelve) hours. for 21 days  Qty: 42 tablet, Refills: 0      doxycycline (VIBRA-TABS) 100 MG tablet Take 1 tablet (100 mg total) by mouth 2 (two) times daily. for 21 days  Qty: 42 tablet, Refills: 0      nicotine (NICODERM CQ) 14 mg/24 hr Place 1 patch onto the skin once daily.  Qty: 30 patch, Refills: 0      !! senna-docusate 8.6-50 mg (PERICOLACE) 8.6-50 mg per tablet Take 1 tablet by mouth once daily.  Qty: 30 tablet, Refills: 1       !! - Potential duplicate medications found. Please discuss with provider.        CONTINUE these medications which have CHANGED    Details   polyethylene glycol (GLYCOLAX) 17 gram PwPk Take 17 g by mouth once daily.  Qty: 30 each, Refills: 1           CONTINUE these medications which have NOT CHANGED    Details   acetaminophen (TYLENOL) 500 MG tablet Take 2 tablets (1,000 mg total) by mouth every 6 (six) hours as needed for Pain.  Qty: 30 tablet, Refills: 0      albuterol (PROVENTIL/VENTOLIN HFA) 90  mcg/actuation inhaler INHALE 2 PUFFS INTO THE LUNGS EVERY 6 HOURS AS NEEDED FOR WHEEZING. RESCUE  Qty: 6.7 g, Refills: 2    Associated Diagnoses: Chronic obstructive pulmonary disease, unspecified COPD type      apixaban (ELIQUIS) 5 mg Tab Take 1 tablet (5 mg total) by mouth 2 (two) times daily.  Qty: 60 tablet, Refills: 0    Associated Diagnoses: Chronic deep vein thrombosis (DVT) of proximal vein of lower extremity, unspecified laterality      aspirin 81 MG Chew Take 1 tablet (81 mg total) by mouth once daily.  Qty: 30 tablet, Refills: 0      bumetanide (BUMEX) 1 MG tablet Take 1 tablet (1 mg total) by mouth once daily.  Qty: 30 tablet, Refills: 5    Associated Diagnoses: Essential hypertension      divalproex (DEPAKOTE) 500 MG TbEC Take 1 tablet (500 mg total) by mouth once daily. PO QAM  Qty: 30 tablet, Refills: 11      ferrous sulfate 325 (65 FE) MG EC tablet Take 1 tablet (325 mg total) by mouth once daily.  Qty: 30 tablet, Refills: 0      hydrOXYzine (ATARAX) 50 MG tablet Take 0.5 tablets (25 mg total) by mouth 4 (four) times daily as needed for Itching or Anxiety.      lisinopriL 10 MG tablet Take 1 tablet (10 mg total) by mouth once daily.  Qty: 30 tablet, Refills: 0    Comments: .  Associated Diagnoses: Essential hypertension; Type 2 diabetes mellitus with diabetic polyneuropathy, without long-term current use of insulin      loratadine (CLARITIN) 10 mg tablet Take 1 tablet (10 mg total) by mouth once daily.  Qty: 90 tablet, Refills: 3    Associated Diagnoses: Non-seasonal allergic rhinitis, unspecified trigger      metFORMIN (GLUCOPHAGE) 1000 MG tablet Take 1 tablet (1,000 mg total) by mouth 2 (two) times daily with meals.  Qty: 180 tablet, Refills: 1    Associated Diagnoses: Type 2 diabetes mellitus with diabetic polyneuropathy, without long-term current use of insulin      metoprolol tartrate (LOPRESSOR) 25 MG tablet Take 1 tablet (25 mg total) by mouth 2 (two) times daily.  Qty: 60 tablet, Refills: 0     Comments: .  Associated Diagnoses: Essential hypertension      multivitamin Tab Take 1 tablet by mouth once daily.  Qty: 30 tablet, Refills: 2      pantoprazole (PROTONIX) 40 MG tablet Take 1 tablet (40 mg total) by mouth once daily.  Qty: 30 tablet, Refills: 0    Associated Diagnoses: Gastroesophageal reflux disease with esophagitis without hemorrhage      pravastatin (PRAVACHOL) 40 MG tablet Take 1 tablet (40 mg total) by mouth every evening.  Qty: 30 tablet, Refills: 0    Associated Diagnoses: Type 2 diabetes mellitus with diabetic polyneuropathy, without long-term current use of insulin      risperiDONE (RISPERDAL M-TABS) 3 MG disintegrating tablet Take 1 tablet (3 mg total) by mouth 2 (two) times daily.  Qty: 60 tablet, Refills: 0      VYVANSE 40 mg Cap Take 40 mg by mouth once daily.      albuterol-ipratropium (DUO-NEB) 2.5 mg-0.5 mg/3 mL nebulizer solution Take 3 mLs by nebulization every 6 (six) hours as needed for Wheezing or Shortness of Breath. Rescue  Qty: 1 each, Refills: 0    Associated Diagnoses: Chronic obstructive pulmonary disease, unspecified COPD type      ammonium lactate (LAC-HYDRIN) 12 % lotion APPL Y ONCE TOPICALLY TWICE DAILY FOR 30 DAYS  Qty: 225 g, Refills: 0      DUPIXENT  mg/2 mL PnIj Inject into the skin.      fluticasone propionate (FLONASE) 50 mcg/actuation nasal spray 2 sprays (100 mcg total) by Each Nostril route daily as needed (Nasal congestion).  Qty: 9.9 mL, Refills: 0      fluticasone-salmeterol diskus inhaler 250-50 mcg Inhale 1 puff into the lungs 2 (two) times daily. Controller  Qty: 60 each, Refills: 0    Associated Diagnoses: Chronic obstructive pulmonary disease, unspecified COPD type      insulin detemir U-100 (LEVEMIR FLEXTOUCH) 100 unit/mL (3 mL) SubQ InPn pen Inject 22 Units into the skin every evening.  Qty: 6.6 mL, Refills: 0      LIDOcaine (LIDODERM) 5 % Place 1 patch onto the skin once daily. Remove & Discard patch within 12 hours or as directed by  MD  Refills: 0      magnesium hydroxide 400 mg/5 ml (MILK OF MAGNESIA) 400 mg/5 mL Susp Take 30 mLs (2,400 mg total) by mouth daily as needed.      methocarbamoL (ROBAXIN) 500 MG Tab Take 500 mg by mouth. Frequency could not be confirmed.      nystatin (NYSTOP) powder APPLY TO ABDOMINAL AND BREAST SKIN FOLD TWICE DAILY.  Qty: 60 g, Refills: 0    Associated Diagnoses: Tinea      oxyCODONE-acetaminophen (PERCOCET)  mg per tablet Take 1 tablet by mouth every 6 (six) hours as needed for Pain.  Qty: 16 tablet, Refills: 0    Comments: Quantity prescribed more than 7 day supply? No      !! senna-docusate 8.6-50 mg (PERICOLACE) 8.6-50 mg per tablet Take 1 tablet by mouth 2 (two) times daily.       !! - Potential duplicate medications found. Please discuss with provider.        STOP taking these medications       semaglutide (OZEMPIC) 1 mg/dose (4 mg/3 mL) Comments:   Reason for Stopping:               I certify that this patient is confined to her home and needs intermittent skilled nursing care, physical therapy and occupational therapy.    Mariam Montano MD  3/3/2023 12:36 PM  Department of Hospital medicine

## 2023-03-03 NOTE — ASSESSMENT & PLAN NOTE
"51y/o M with h/o DM with charcot foot, DVT on Eliquis, PAD, ongoing tobacco abuse admitted 2/23 with chronic L foot wound and worsening pain/swelling. Notably just completed 4 weeks vancomycin at LTAC for possible (note prior bone biopsy path/cultures 1/13 negative for OM). LLE Dvt studies negative. S/p podiatry debridement at bedside - gram stain negative, cultures rare growth of pseudomonas. Currently on vancomycin. ID consulted for "foot wound, leukocytosis, elevated lactic"    JEYSON with  hemodynamically significant stenosis in the left and DPA. Multifocal mild to moderate grade stenoses is suspected throughout the left MIRACLE and, bilateral posterior tibial and peroneal arteries.    Not clear that infection is playing role in chronic foot ulcer. 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 MRI evidence of osteo could be recently treated infection. Pathology again without evidence of osteomyeltis and bone cultures negative.  Likely poor wound healing from combination from  DM, ongoing tobacco abuse, PAD and psychosocial factors.    Recommendations:   -  agree with vanc/cefepime while inpatient. On d/c would transition to po cipro 750 bid/doxy 100 bid for another few weeks for SSTI, ultimate duration TBD based on wound healing/clinical improvement (continue until ID f/u appt)  - defer need for amputation to podiatry, but would likely be definitive treatment of infection  - counseled on need for tobacco cessation  - please ensure she has adequate perfusion to heal wounds    Discussed with patient side effects of quinolones (including but not limited to qtc prolongation, hypoglycemia, achilles tendon rupture, worsening of aneurysms, c.diff) and these are acceptable risks and preferable to IV medication and risk of picc line complications. Qtc = 439.Avoid milk products within 2 hours    Outpatient Infectious Diseases Follow-up     Follow-up " appointment will be arranged by the ID clinic and will be found in the patient's appointments tab.     Prior to discharge, please ensure the patient's follow-up has been scheduled.     If there is still no follow-up scheduled prior to discharge, please send an EPIC message to Rosemary Balderas in Infectious Diseases.

## 2023-03-03 NOTE — PROGRESS NOTES
Vancomycin consult follow-up:    Patient reviewed, renal function stable, no new levels, continue current therapy; Next levels due: trough due 3/4/2023 at 0300

## 2023-03-03 NOTE — PLAN OF CARE
Problem: Skin Injury Risk Increased  Goal: Skin Health and Integrity  Outcome: Ongoing, Progressing     Problem: Adult Inpatient Plan of Care  Goal: Plan of Care Review  Outcome: Ongoing, Progressing  Goal: Optimal Comfort and Wellbeing  Outcome: Ongoing, Progressing  Goal: Readiness for Transition of Care  Outcome: Ongoing, Progressing     Problem: Infection  Goal: Absence of Infection Signs and Symptoms  Outcome: Ongoing, Progressing     Problem: Fall Injury Risk  Goal: Absence of Fall and Fall-Related Injury  Outcome: Ongoing, Progressing     Problem: Diabetes Comorbidity  Goal: Blood Glucose Level Within Targeted Range  Outcome: Ongoing, Progressing     Problem: Impaired Wound Healing  Goal: Optimal Wound Healing  Outcome: Ongoing, Progressing

## 2023-03-04 PROBLEM — R42 LIGHTHEADED: Status: ACTIVE | Noted: 2023-03-04

## 2023-03-04 LAB
ACID FAST MOD KINY STN SPEC: NORMAL
BACTERIA SPEC AEROBE CULT: NO GROWTH
BACTERIA SPEC ANAEROBE CULT: NORMAL
MYCOBACTERIUM SPEC QL CULT: NORMAL

## 2023-03-06 ENCOUNTER — HOSPITAL ENCOUNTER (OUTPATIENT)
Dept: CARDIOLOGY | Facility: HOSPITAL | Age: 53
Discharge: HOME | End: 2023-03-06
Attending: INTERNAL MEDICINE
Payer: MEDICAID

## 2023-03-06 ENCOUNTER — PATIENT OUTREACH (OUTPATIENT)
Dept: ADMINISTRATIVE | Facility: CLINIC | Age: 53
End: 2023-03-06
Payer: MEDICAID

## 2023-03-06 ENCOUNTER — PATIENT OUTREACH (OUTPATIENT)
Dept: ADMINISTRATIVE | Facility: OTHER | Age: 53
End: 2023-03-06
Payer: MEDICAID

## 2023-03-06 NOTE — PROGRESS NOTES
C3 nurse spoke with Audrey Natarajan for a TCC post hospital discharge follow up call. The patient has a scheduled [facility] appointment with Donaldo Pena MD   on 3/15/23  @ 120pm.

## 2023-03-06 NOTE — PROGRESS NOTES
CHW - Initial Contact    This Community Health Worker completed the Social Determinant of Health questionnaire with patient via telephone today.    Pt identified barriers of most importance are: emergency food and transportation. Patient stated that she gets [employer].   Referrals to community agencies completed with patient/caregiver consent outside of [facility] include: yes, [employer].  Referrals were put through [facility] - yes: [facility].  Support and Services: no support.  Other information discussed the patient needs / wants help with: none at this time. Will f/u with patient in one week to check on patient's well-being and referrals.   Follow up required: yes.  Follow-up Outreach - Due: 3/12/2023

## 2023-03-09 ENCOUNTER — HOSPITAL ENCOUNTER (OUTPATIENT)
Dept: WOUND CARE | Facility: HOSPITAL | Age: 53
Discharge: HOME | End: 2023-03-09
Attending: HOSPITALIST
Payer: MEDICAID

## 2023-03-09 VITALS — TEMPERATURE: 98 F | DIASTOLIC BLOOD PRESSURE: 50 MMHG | SYSTOLIC BLOOD PRESSURE: 104 MMHG | HEART RATE: 97 BPM

## 2023-03-09 DIAGNOSIS — S91.302D OPEN WOUND OF LEFT FOOT, SUBSEQUENT ENCOUNTER: ICD-10-CM

## 2023-03-09 DIAGNOSIS — L97.423 LEFT MIDFOOT ULCER, WITH NECROSIS OF MUSCLE: ICD-10-CM

## 2023-03-09 DIAGNOSIS — S91.302A OPEN WOUND OF LEFT FOOT: Primary | ICD-10-CM

## 2023-03-09 PROCEDURE — 99213 OFFICE O/P EST LOW 20 MIN: CPT | Mod: ,,, | Performed by: FAMILY MEDICINE

## 2023-03-09 PROCEDURE — 99213 OFFICE O/P EST LOW 20 MIN: CPT | Performed by: FAMILY MEDICINE

## 2023-03-09 PROCEDURE — 99213 PR OFFICE/OUTPT VISIT, EST, LEVL III, 20-29 MIN: ICD-10-PCS | Mod: ,,, | Performed by: FAMILY MEDICINE

## 2023-03-15 ENCOUNTER — OFFICE VISIT (OUTPATIENT)
Dept: FAMILY MEDICINE | Facility: CLINIC | Age: 53
End: 2023-03-15
Payer: MEDICAID

## 2023-03-15 ENCOUNTER — LAB VISIT (OUTPATIENT)
Dept: LAB | Facility: HOSPITAL | Age: 53
End: 2023-03-15
Attending: INTERNAL MEDICINE
Payer: MEDICAID

## 2023-03-15 VITALS
HEART RATE: 92 BPM | RESPIRATION RATE: 18 BRPM | OXYGEN SATURATION: 99 % | HEIGHT: 66 IN | BODY MASS INDEX: 36.67 KG/M2 | SYSTOLIC BLOOD PRESSURE: 110 MMHG | DIASTOLIC BLOOD PRESSURE: 50 MMHG | TEMPERATURE: 98 F | WEIGHT: 228.19 LBS

## 2023-03-15 DIAGNOSIS — E66.01 MORBID OBESITY: ICD-10-CM

## 2023-03-15 DIAGNOSIS — L97.509 TYPE 2 DIABETES MELLITUS WITH FOOT ULCER, WITHOUT LONG-TERM CURRENT USE OF INSULIN: Primary | ICD-10-CM

## 2023-03-15 DIAGNOSIS — L97.509 TYPE 2 DIABETES MELLITUS WITH FOOT ULCER, WITHOUT LONG-TERM CURRENT USE OF INSULIN: ICD-10-CM

## 2023-03-15 DIAGNOSIS — E11.42 TYPE 2 DIABETES MELLITUS WITH DIABETIC POLYNEUROPATHY, WITHOUT LONG-TERM CURRENT USE OF INSULIN: ICD-10-CM

## 2023-03-15 DIAGNOSIS — G47.33 OSA (OBSTRUCTIVE SLEEP APNEA): ICD-10-CM

## 2023-03-15 DIAGNOSIS — E11.621 TYPE 2 DIABETES MELLITUS WITH FOOT ULCER, WITHOUT LONG-TERM CURRENT USE OF INSULIN: Primary | ICD-10-CM

## 2023-03-15 DIAGNOSIS — E11.621 TYPE 2 DIABETES MELLITUS WITH FOOT ULCER, WITHOUT LONG-TERM CURRENT USE OF INSULIN: ICD-10-CM

## 2023-03-15 DIAGNOSIS — I82.5Y9 CHRONIC DEEP VEIN THROMBOSIS (DVT) OF PROXIMAL VEIN OF LOWER EXTREMITY, UNSPECIFIED LATERALITY: ICD-10-CM

## 2023-03-15 LAB
ACANTHOCYTES BLD QL SMEAR: PRESENT
ALBUMIN SERPL BCP-MCNC: 3 G/DL (ref 3.5–5.2)
ALP SERPL-CCNC: 80 U/L (ref 55–135)
ALT SERPL W/O P-5'-P-CCNC: 8 U/L (ref 10–44)
ANION GAP SERPL CALC-SCNC: 19 MMOL/L (ref 8–16)
ANISOCYTOSIS BLD QL SMEAR: SLIGHT
AST SERPL-CCNC: 8 U/L (ref 10–40)
BASOPHILS # BLD AUTO: 0.25 K/UL (ref 0–0.2)
BASOPHILS NFR BLD: 1.3 % (ref 0–1.9)
BILIRUB SERPL-MCNC: 0.1 MG/DL (ref 0.1–1)
BUN SERPL-MCNC: 14 MG/DL (ref 6–20)
BURR CELLS BLD QL SMEAR: ABNORMAL
CALCIUM SERPL-MCNC: 8.8 MG/DL (ref 8.7–10.5)
CHLORIDE SERPL-SCNC: 100 MMOL/L (ref 95–110)
CO2 SERPL-SCNC: 23 MMOL/L (ref 23–29)
CREAT SERPL-MCNC: 0.9 MG/DL (ref 0.5–1.4)
CRP SERPL-MCNC: 5.5 MG/L (ref 0–8.2)
DIFFERENTIAL METHOD: ABNORMAL
EOSINOPHIL # BLD AUTO: 0.6 K/UL (ref 0–0.5)
EOSINOPHIL NFR BLD: 2.8 % (ref 0–8)
ERYTHROCYTE [DISTWIDTH] IN BLOOD BY AUTOMATED COUNT: 18.8 % (ref 11.5–14.5)
ERYTHROCYTE [SEDIMENTATION RATE] IN BLOOD BY WESTERGREN METHOD: 12 MM/HR (ref 0–20)
EST. GFR  (NO RACE VARIABLE): >60 ML/MIN/1.73 M^2
GIANT PLATELETS BLD QL SMEAR: PRESENT
GLUCOSE SERPL-MCNC: 260 MG/DL (ref 70–110)
HCT VFR BLD AUTO: 35.5 % (ref 37–48.5)
HGB BLD-MCNC: 10.9 G/DL (ref 12–16)
HYPOCHROMIA BLD QL SMEAR: ABNORMAL
IMM GRANULOCYTES # BLD AUTO: 0.2 K/UL (ref 0–0.04)
IMM GRANULOCYTES NFR BLD AUTO: 1 % (ref 0–0.5)
LYMPHOCYTES # BLD AUTO: 6.6 K/UL (ref 1–4.8)
LYMPHOCYTES NFR BLD: 33.3 % (ref 18–48)
MCH RBC QN AUTO: 23.4 PG (ref 27–31)
MCHC RBC AUTO-ENTMCNC: 30.7 G/DL (ref 32–36)
MCV RBC AUTO: 76 FL (ref 82–98)
MONOCYTES # BLD AUTO: 1.5 K/UL (ref 0.3–1)
MONOCYTES NFR BLD: 7.8 % (ref 4–15)
NEUTROPHILS # BLD AUTO: 10.6 K/UL (ref 1.8–7.7)
NEUTROPHILS NFR BLD: 53.8 % (ref 38–73)
NRBC BLD-RTO: 0 /100 WBC
OVALOCYTES BLD QL SMEAR: ABNORMAL
PLATELET # BLD AUTO: 631 K/UL (ref 150–450)
PLATELET BLD QL SMEAR: ABNORMAL
PMV BLD AUTO: 10.4 FL (ref 9.2–12.9)
POIKILOCYTOSIS BLD QL SMEAR: SLIGHT
POTASSIUM SERPL-SCNC: 4.1 MMOL/L (ref 3.5–5.1)
PROT SERPL-MCNC: 6.5 G/DL (ref 6–8.4)
RBC # BLD AUTO: 4.66 M/UL (ref 4–5.4)
SCHISTOCYTES BLD QL SMEAR: ABNORMAL
SCHISTOCYTES BLD QL SMEAR: PRESENT
SODIUM SERPL-SCNC: 142 MMOL/L (ref 136–145)
WBC # BLD AUTO: 19.77 K/UL (ref 3.9–12.7)

## 2023-03-15 PROCEDURE — 36415 COLL VENOUS BLD VENIPUNCTURE: CPT | Mod: PN | Performed by: INTERNAL MEDICINE

## 2023-03-15 PROCEDURE — 1160F RVW MEDS BY RX/DR IN RCRD: CPT | Mod: CPTII,,, | Performed by: INTERNAL MEDICINE

## 2023-03-15 PROCEDURE — 1159F PR MEDICATION LIST DOCUMENTED IN MEDICAL RECORD: ICD-10-PCS | Mod: CPTII,,, | Performed by: INTERNAL MEDICINE

## 2023-03-15 PROCEDURE — 1160F PR REVIEW ALL MEDS BY PRESCRIBER/CLIN PHARMACIST DOCUMENTED: ICD-10-PCS | Mod: CPTII,,, | Performed by: INTERNAL MEDICINE

## 2023-03-15 PROCEDURE — 99495 TRANSJ CARE MGMT MOD F2F 14D: CPT | Mod: S$PBB,,, | Performed by: INTERNAL MEDICINE

## 2023-03-15 PROCEDURE — 99999 PR PBB SHADOW E&M-EST. PATIENT-LVL V: ICD-10-PCS | Mod: PBBFAC,,, | Performed by: INTERNAL MEDICINE

## 2023-03-15 PROCEDURE — 3074F PR MOST RECENT SYSTOLIC BLOOD PRESSURE < 130 MM HG: ICD-10-PCS | Mod: CPTII,,, | Performed by: INTERNAL MEDICINE

## 2023-03-15 PROCEDURE — 3008F PR BODY MASS INDEX (BMI) DOCUMENTED: ICD-10-PCS | Mod: CPTII,,, | Performed by: INTERNAL MEDICINE

## 2023-03-15 PROCEDURE — 3078F DIAST BP <80 MM HG: CPT | Mod: CPTII,,, | Performed by: INTERNAL MEDICINE

## 2023-03-15 PROCEDURE — 83036 HEMOGLOBIN GLYCOSYLATED A1C: CPT | Performed by: INTERNAL MEDICINE

## 2023-03-15 PROCEDURE — 86140 C-REACTIVE PROTEIN: CPT | Performed by: INTERNAL MEDICINE

## 2023-03-15 PROCEDURE — 4010F PR ACE/ARB THEARPY RXD/TAKEN: ICD-10-PCS | Mod: CPTII,,, | Performed by: INTERNAL MEDICINE

## 2023-03-15 PROCEDURE — 1159F MED LIST DOCD IN RCRD: CPT | Mod: CPTII,,, | Performed by: INTERNAL MEDICINE

## 2023-03-15 PROCEDURE — 4010F ACE/ARB THERAPY RXD/TAKEN: CPT | Mod: CPTII,,, | Performed by: INTERNAL MEDICINE

## 2023-03-15 PROCEDURE — 99495 TCM SERVICES (MODERATE COMPLEXITY): ICD-10-PCS | Mod: S$PBB,,, | Performed by: INTERNAL MEDICINE

## 2023-03-15 PROCEDURE — 85652 RBC SED RATE AUTOMATED: CPT | Performed by: INTERNAL MEDICINE

## 2023-03-15 PROCEDURE — 85025 COMPLETE CBC W/AUTO DIFF WBC: CPT | Performed by: INTERNAL MEDICINE

## 2023-03-15 PROCEDURE — 80053 COMPREHEN METABOLIC PANEL: CPT | Performed by: INTERNAL MEDICINE

## 2023-03-15 PROCEDURE — 3078F PR MOST RECENT DIASTOLIC BLOOD PRESSURE < 80 MM HG: ICD-10-PCS | Mod: CPTII,,, | Performed by: INTERNAL MEDICINE

## 2023-03-15 PROCEDURE — 99215 OFFICE O/P EST HI 40 MIN: CPT | Mod: PBBFAC,PN | Performed by: INTERNAL MEDICINE

## 2023-03-15 PROCEDURE — 3074F SYST BP LT 130 MM HG: CPT | Mod: CPTII,,, | Performed by: INTERNAL MEDICINE

## 2023-03-15 PROCEDURE — 99999 PR PBB SHADOW E&M-EST. PATIENT-LVL V: CPT | Mod: PBBFAC,,, | Performed by: INTERNAL MEDICINE

## 2023-03-15 PROCEDURE — 3008F BODY MASS INDEX DOCD: CPT | Mod: CPTII,,, | Performed by: INTERNAL MEDICINE

## 2023-03-15 RX ORDER — TRAMADOL HYDROCHLORIDE 50 MG/1
50 TABLET ORAL EVERY 8 HOURS PRN
Qty: 30 TABLET | Refills: 0 | Status: SHIPPED | OUTPATIENT
Start: 2023-03-15 | End: 2023-06-15 | Stop reason: SDUPTHER

## 2023-03-15 NOTE — DISCHARGE SUMMARY
[facility] Medicine  Discharge Summary      Patient Name: Audrey Natarajan  MRN: [id number]  Patient Class: IP- Inpatient  Admission Date: 2/23/2023  Hospital Length of Stay: 8 days  Discharge Date and Time: 3/3/2023  4:58 PM  Attending Physician: No att. providers found   Discharging Provider: Mariam Montano MD  Primary Care Provider: Donaldo Pena MD      HPI:   52 y.o. female with hypertension, hyperlipidemia, COPD, bipolar 1 disorder, DM2 presents with a complaint of foot pain.  Appears to be a chronic intermittent problem for some time, associated with erythema and edema of the extremity.  Was on IV vancomycin for awhile.  Has home health wound care.  Sees Podiatry Dr. De Los Santos and Infectious Disease Dr. Jarocoh panchal.  Denies fever, chills, cough, SOB, chest pain, palpitations, dizziness, syncope, nausea, vomiting, diarrhea, abdominal pain.  In the ED, she was found to have leukocytosis with elevated lactic acid.  Inflammatory markers are also elevated.  X-ray of the foot an ultrasound of the veins of the lower extremity were unremarkable for acute abnormality.  Blood cultures were obtained and she was started on IV vancomycin in the ED.  Placed in observation.      Procedure(s) (LRB):  DEBRIDEMENT, FOOT,biopsy (Left)      Hospital Course:   Osteomyelitis of left foot  Erythema, edema, with worsening pain.     - wound culture Pseudomonas   - Podiatry and ID consulted.  - to OR 2/28 for debridement. Culture pending  - ID consulted- started vanc and cefepime post operatively   - PICC line placed prior to dc   - PT, OT- no weight bearing L foot         Charcot's joint of foot  Noted risk factor for wounds        SHAWN (obstructive sleep apnea)  CPAP QHS           Bipolar 1 disorder  Continue home regimen of risperidone     History of pulmonary embolus (PE)  History of PE/DVT- resumed eliquis        History of DVT (deep vein thrombosis)  Resumed eliquis     Tobacco abuse  5 minutes spent counseling  the patient on smoking cessation and she is not currently ready to stop smoking. She will be monitored for withdrawal.  She will be provided with additional smoking cessation counseling prior to discharge.     Hyperlipidemia  No recent lipid panel to review- repeat by PCP as outpatient  Continue statin     COPD (chronic obstructive pulmonary disease)  No signs of acute exacerbation, stable on room air  P.r.n. nebs     Essential hypertension  BP is controlled  Continue home lisinopril and metoprolol  Bumex held- resume prior to dc         Goals of Care Treatment Preferences:  Code Status: Full Code    Living Will: Yes              Consults:   Consults (From admission, onward)        Status Ordering Provider     Inpatient virtual consult to Hospital Medicine  Once        Provider:  Mariam Montano MD    Completed EVE VASQUEZ     Inpatient consult to Infectious Diseases  Once        Provider:  Beverly Zavala MD    Completed JOSE DANIEL INTERIANO JR     Inpatient consult to Podiatry  Once        Provider:  Maira De Los Santos DPM    Completed JOSE DANIEL INTERIANO JR          No new Assessment & Plan notes have been filed under this hospital service since the last note was generated.  Service: Hospital Medicine    Final Active Diagnoses:    Diagnosis Date Noted POA    PRINCIPAL PROBLEM:  Osteomyelitis of left foot [M86.9] 02/10/2021 Yes    Charcot's joint of foot [M14.679]  Yes    SHAWN (obstructive sleep apnea) [G47.33] 02/09/2022 Yes    Type 2 diabetes mellitus [E11.9] 09/26/2016 Yes     Chronic    Class 2 severe obesity with serious comorbidity and body mass index (BMI) of 37.0 to 37.9 in adult [E66.01, Z68.37] 08/10/2016 Not Applicable    Bipolar 1 disorder [F31.9] 04/13/2015 Yes     Chronic    History of DVT (deep vein thrombosis) [Z86.718] 04/13/2015 Not Applicable     Chronic    History of pulmonary embolus (PE) [Z86.711] 04/13/2015 Yes     Chronic    Tobacco abuse [Z72.0] 03/06/2014 Yes     Chronic     Hyperlipidemia [E78.5] 02/13/2014 Yes     Chronic    COPD (chronic obstructive pulmonary disease) [J44.9] 10/11/2013 Yes     Chronic    Essential hypertension [I10] 06/06/2013 Yes     Chronic      Problems Resolved During this Admission:       Discharged Condition: stable    Disposition: Home-Health Care Svc    Follow Up:   Follow-up Information     [facility] Follow up.    Why: please call at time of discharge to schedule follow up appointment and establish care for future needs  Contact information:  [address]  [place] [id number]  [phone]             [facility] Follow up on 3/4/2023.    Specialty: Home Health Services  Why: Home Health  Contact information:  [address] [place] [address]  [phone]             [facility].    Contact information:  [address] [facility]  [phone]                     Patient Instructions:      Ambulatory referral/consult to Smoking Cessation Program   Standing Status: Future   Referral Priority: Routine Referral Type: Consultation   Referral Reason: Specialty Services Required   Requested Specialty: CTTS   Number of Visits Requested: 1     Ambulatory referral/consult to Physical/Occupational Therapy   Standing Status: Future   Referral Priority: Routine Referral Type: Physical Medicine   Referral Reason: Specialty Services Required   Number of Visits Requested: 1     Ambulatory referral/consult to Wound Clinic   Standing Status: Future   Referral Priority: Routine Referral Type: Consultation   Referral Reason: Specialty Services Required   Requested Specialty: Wound Care   Number of Visits Requested: 1     Diet Cardiac     Notify your health care provider if you experience any of the following:  temperature >100.4     Notify your health care provider if you experience any of the following:  persistent nausea and vomiting or diarrhea     Notify your health care provider if you experience any of the following:   severe uncontrolled pain     Notify your health care provider if you experience any of the following:  redness, tenderness, or signs of infection (pain, swelling, redness, odor or green/yellow discharge around incision site)     Notify your health care provider if you experience any of the following:  difficulty breathing or increased cough     Notify your health care provider if you experience any of the following:  severe persistent headache     Notify your health care provider if you experience any of the following:  worsening rash     Notify your health care provider if you experience any of the following:  persistent dizziness, light-headedness, or visual disturbances     Notify your health care provider if you experience any of the following:  increased confusion or weakness     Send follow up & questions to   Order Comments: Patient's primary care physician: Donaldo Pena MD     Activity as tolerated       Significant Diagnostic Studies: Labs: CMP No results for input(s): NA, K, CL, CO2, GLU, BUN, CREATININE, CALCIUM, PROT, ALBUMIN, BILITOT, ALKPHOS, AST, ALT, ANIONGAP, ESTGFRAFRICA, EGFRNONAA in the last 48 hours. and CBC No results for input(s): WBC, HGB, HCT, PLT in the last 48 hours.    Pending Diagnostic Studies:     None         Medications:  Reconciled Home Medications:      Medication List      START taking these medications    ciprofloxacin HCl 750 MG tablet  Commonly known as: CIPRO  Take 1 tablet (750 mg total) by mouth every 12 (twelve) hours. for 21 days     doxycycline 100 MG tablet  Commonly known as: VIBRA-TABS  Take 1 tablet (100 mg total) by mouth 2 (two) times daily. for 21 days     GAVILAX 17 gram/dose powder  Generic drug: polyethylene glycol  Mix 1 capful (17 g) with fluids and drink by mouth once daily.  Replaces: polyethylene glycol 17 gram Pwpk     nicotine 14 mg/24 hr  Commonly known as: NICODERM CQ  Place 1 patch onto the skin once daily.     SENEXON-S 8.6-50 mg per  tablet  Generic drug: senna-docusate 8.6-50 mg  Take 1 tablet by mouth once daily.        CHANGE how you take these medications    risperiDONE 3 MG disintegrating tablet  Commonly known as: RISPERDAL M-TABS  Take 1 tablet (3 mg total) by mouth 2 (two) times daily.  What changed: when to take this        CONTINUE taking these medications    acetaminophen 500 MG tablet  Commonly known as: TYLENOL  Take 2 tablets (1,000 mg total) by mouth every 6 (six) hours as needed for Pain.     albuterol 90 mcg/actuation inhaler  Commonly known as: PROVENTIL/VENTOLIN HFA  INHALE 2 PUFFS INTO THE LUNGS EVERY 6 HOURS AS NEEDED FOR WHEEZING. RESCUE     albuterol-ipratropium 2.5 mg-0.5 mg/3 mL nebulizer solution  Commonly known as: DUO-NEB  Take 3 mLs by nebulization every 6 (six) hours as needed for Wheezing or Shortness of Breath. Rescue     ammonium lactate 12 % lotion  Commonly known as: LAC-HYDRIN  APPL Y ONCE TOPICALLY TWICE DAILY FOR 30 DAYS     apixaban 5 mg Tab  Commonly known as: ELIQUIS  Take 1 tablet (5 mg total) by mouth 2 (two) times daily.     aspirin 81 MG Chew  Take 1 tablet (81 mg total) by mouth once daily.     bumetanide 1 MG tablet  Commonly known as: BUMEX  Take 1 tablet (1 mg total) by mouth once daily.     divalproex 500 MG Tbec  Commonly known as: DEPAKOTE  Take 1 tablet (500 mg total) by mouth once daily. PO QAM     DUPIXENT  mg/2 mL Pnij  Generic drug: dupilumab  Inject into the skin.     ferrous sulfate 325 (65 FE) MG EC tablet  Take 1 tablet (325 mg total) by mouth once daily.     fluticasone propionate 50 mcg/actuation nasal spray  Commonly known as: FLONASE  2 sprays (100 mcg total) by Each Nostril route daily as needed (Nasal congestion).     fluticasone-salmeterol 250-50 mcg/dose 250-50 mcg/dose diskus inhaler  Commonly known as: ADVAIR  Inhale 1 puff into the lungs 2 (two) times daily. Controller     hydrOXYzine 50 MG tablet  Commonly known as: ATARAX  Take 0.5 tablets (25 mg total) by mouth 4  (four) times daily as needed for Itching or Anxiety.     LIDOcaine 5 %  Commonly known as: LIDODERM  Place 1 patch onto the skin once daily. Remove & Discard patch within 12 hours or as directed by MD     lisinopriL 10 MG tablet  Take 1 tablet (10 mg total) by mouth once daily.     loratadine 10 mg tablet  Commonly known as: CLARITIN  Take 1 tablet (10 mg total) by mouth once daily.     metFORMIN 1000 MG tablet  Commonly known as: GLUCOPHAGE  Take 1 tablet (1,000 mg total) by mouth 2 (two) times daily with meals.     methocarbamoL 500 MG Tab  Commonly known as: ROBAXIN  Take 500 mg by mouth. Frequency could not be confirmed.     metoprolol tartrate 25 MG tablet  Commonly known as: LOPRESSOR  Take 1 tablet (25 mg total) by mouth 2 (two) times daily.     multivitamin Tab  Take 1 tablet by mouth once daily.     nystatin powder  Commonly known as: NYSTOP  APPLY TO ABDOMINAL AND BREAST SKIN FOLD TWICE DAILY.     oxyCODONE-acetaminophen  mg per tablet  Commonly known as: PERCOCET  Take 1 tablet by mouth every 6 (six) hours as needed for Pain.     pantoprazole 40 MG tablet  Commonly known as: PROTONIX  Take 1 tablet (40 mg total) by mouth once daily.     pravastatin 40 MG tablet  Commonly known as: PRAVACHOL  Take 1 tablet (40 mg total) by mouth every evening.     VYVANSE 40 MG Cap  Generic drug: lisdexamfetamine  Take 40 mg by mouth once daily.        STOP taking these medications    OZEMPIC 1 mg/dose (4 mg/3 mL)  Generic drug: semaglutide     polyethylene glycol 17 gram Pwpk  Commonly known as: GLYCOLAX  Replaced by: GAVILAX 17 gram/dose powder            Indwelling Lines/Drains at time of discharge:   Lines/Drains/Airways     None                 Time spent on the discharge of patient: 39 minutes         The attending portion of this evaluation, treatment, and documentation was performed per Mariam Montano MD via Telemedicine AudioVisual using the secure [employer] software platform with 2 way audio/video. The  provider was located off-site and the patient is located in the hospital. The aforementioned video software was utilized to document the relevant history and physical exam    Mariam Montano MD  Department of Hospital Medicine  [facility]

## 2023-03-15 NOTE — PROGRESS NOTES
"Subjective:       Patient ID: Audrey Natarajan is a 52 y.o. female.    Chief Complaint: Follow-up (Hospital f/u)    Hospital follow up    HPI: 53 y/o w/ bipolar disorder HTN DM tobacco dependence SHAWN on nightly cpap h/o recurrent DVT on noac presents alone for hospital follow up. Admitted two times in last month for worsening pain an drainage to left plantar ulcer. MRI and bone biopsy not showing any significant osteomyelitis. She remains on oral ciprofloxacin and doxycyline. No loose stool she has home health changing dressing twice per week NPWT was discontinued due to inability to keep seal and maceration of periwound skin. No fevers/chills. Her primary complaint is planter foot pain. She had been prescribed oxycodone at discharge is out of this. Did find it was more constipating no nausea/vomitting     Review of Systems   Constitutional:  Negative for activity change, appetite change, fatigue, fever and unexpected weight change.   HENT:  Negative for ear pain, rhinorrhea and sore throat.    Eyes:  Negative for discharge and visual disturbance.   Respiratory:  Negative for chest tightness, shortness of breath and wheezing.    Cardiovascular:  Negative for chest pain, palpitations and leg swelling.   Gastrointestinal:  Negative for abdominal pain, constipation and diarrhea.   Endocrine: Negative for cold intolerance and heat intolerance.   Genitourinary:  Negative for dysuria and hematuria.   Musculoskeletal:  Positive for gait problem and myalgias. Negative for joint swelling and neck stiffness.   Skin:  Negative for rash.   Neurological:  Negative for dizziness, syncope, weakness and headaches.   Psychiatric/Behavioral:  Negative for suicidal ideas.      Objective:     Vitals:    03/15/23 1312   BP: (!) 110/50   Pulse: 92   Resp: 18   Temp: 98.3 °F (36.8 °C)   TempSrc: Oral   SpO2: 99%   Weight: 103.5 kg (228 lb 2.8 oz)   Height: 5' 6" (1.676 m)          Physical Exam  Constitutional:       Appearance: She " is well-developed. She is obese.   HENT:      Head: Normocephalic and atraumatic.   Eyes:      General: No scleral icterus.     Conjunctiva/sclera: Conjunctivae normal.   Cardiovascular:      Rate and Rhythm: Normal rate and regular rhythm.      Heart sounds: No murmur heard.    No friction rub. No gallop.   Pulmonary:      Effort: Pulmonary effort is normal.      Breath sounds: Normal breath sounds. No wheezing or rales.   Abdominal:      General: There is no distension.      Palpations: Abdomen is soft.   Musculoskeletal:         General: No tenderness. Normal range of motion.      Cervical back: Normal range of motion.   Skin:     General: Skin is warm and dry.      Comments: Left foot in football wrap without strike through drainage   Neurological:      Mental Status: She is alert and oriented to person, place, and time.      Cranial Nerves: No cranial nerve deficit.     Transitional Care Note    Family and/or Caretaker present at visit?  No.  Diagnostic tests reviewed/disposition: No diagnosic tests pending after this hospitalization.  Disease/illness education: diabetic foot ulcer and increase mortality s/p BKA  Home health/community services discussion/referrals: Patient has home health established at [facility] .   Establishment or re-establishment of referral orders for community resources: No other necessary community resources.   Discussion with other health care providers: No discussion with other health care providers necessary.         Assessment and Plan   1. Type 2 diabetes mellitus with foot ulcer, without long-term current use of insulin  Repeat a1c today check inflammatory markers in anticipation of ID follow up for chronic osteo  - CBC Auto Differential; Future  - Comprehensive Metabolic Panel; Future  - Sedimentation rate; Future  - C-Reactive Protein; Future  - Hemoglobin A1C; Future  - traMADoL (ULTRAM) 50 mg tablet; Take 1 tablet (50 mg total) by mouth every 8 (eight) hours as needed  for Pain (foot pain).  Dispense: 30 tablet; Refill: 0    2. Type 2 diabetes mellitus with diabetic polyneuropathy, without long-term current use of insulin  As above gabapentin tid    3. Chronic deep vein thrombosis (DVT) of proximal vein of lower extremity, unspecified laterality  On noac continue    4. SHAWN (obstructive sleep apnea)  Nightly cpap    5. Morbid obesity  The patient is asked to make an attempt to improve diet and exercise patterns to aid in medical management of this problem.

## 2023-03-16 ENCOUNTER — TELEPHONE (OUTPATIENT)
Dept: WOUND CARE | Facility: HOSPITAL | Age: 53
End: 2023-03-16
Payer: MEDICAID

## 2023-03-16 ENCOUNTER — PATIENT OUTREACH (OUTPATIENT)
Dept: ADMINISTRATIVE | Facility: HOSPITAL | Age: 53
End: 2023-03-16
Payer: MEDICAID

## 2023-03-16 LAB
ESTIMATED AVG GLUCOSE: 166 MG/DL (ref 68–131)
HBA1C MFR BLD: 7.4 % (ref 4–5.6)

## 2023-03-17 ENCOUNTER — HOSPITAL ENCOUNTER (OUTPATIENT)
Dept: WOUND CARE | Facility: HOSPITAL | Age: 53
Discharge: HOME | End: 2023-03-17
Attending: PODIATRIST
Payer: MEDICAID

## 2023-03-17 VITALS — HEART RATE: 80 BPM | SYSTOLIC BLOOD PRESSURE: 136 MMHG | TEMPERATURE: 97 F | DIASTOLIC BLOOD PRESSURE: 70 MMHG

## 2023-03-17 DIAGNOSIS — S91.302A OPEN WOUND OF LEFT FOOT: ICD-10-CM

## 2023-03-17 DIAGNOSIS — L97.423 LEFT MIDFOOT ULCER, WITH NECROSIS OF MUSCLE: Primary | ICD-10-CM

## 2023-03-17 PROCEDURE — 11045 DBRDMT SUBQ TISS EACH ADDL: CPT

## 2023-03-17 PROCEDURE — 11042 WOUND DEBRIDEMENT: ICD-10-PCS | Mod: ,,, | Performed by: PODIATRIST

## 2023-03-17 PROCEDURE — 11042 DBRDMT SUBQ TIS 1ST 20SQCM/<: CPT | Mod: ,,, | Performed by: PODIATRIST

## 2023-03-17 PROCEDURE — 11045 WOUND DEBRIDEMENT: ICD-10-PCS | Mod: ,,, | Performed by: PODIATRIST

## 2023-03-17 PROCEDURE — 99499 NO LOS: ICD-10-PCS | Mod: ,,, | Performed by: PODIATRIST

## 2023-03-17 PROCEDURE — 99499 UNLISTED E&M SERVICE: CPT | Mod: ,,, | Performed by: PODIATRIST

## 2023-03-17 PROCEDURE — 11042 DBRDMT SUBQ TIS 1ST 20SQCM/<: CPT | Performed by: PODIATRIST

## 2023-03-17 PROCEDURE — 11045 DBRDMT SUBQ TISS EACH ADDL: CPT | Mod: ,,, | Performed by: PODIATRIST

## 2023-03-17 NOTE — PROGRESS NOTES
[facility]                Progress Note    Subjective:       Patient ID: Audrey Natarajan is a 52 y.o. female.    Chief Complaint: Wound Check    To clinic on scooter. States resting as much as possible but continues to have copious drainage and HH not coming on M W F routine as ordered. HH came on Tuesday this week and told her that she had appointment on Friday so that was enough. HH called asked to speak to . Nurse called me back and stated that they had never received orders from us although fax number confirmed and confirmation received. Will continue football for now. Has ID Cx on Wed inpatient wound care appointment made and will continue HH M,W,F except day comes to wound care clinic. Discussed amputation of foot and referral will be sent to Dr Munoz as pt now amenable to procedure now.    Wound Check    Review of Systems   Constitutional: Negative.    Gastrointestinal: Negative.    Musculoskeletal:  Positive for arthralgias.   Skin:  Positive for wound.   Psychiatric/Behavioral: Negative.         Objective:        Physical Exam  Constitutional:       Appearance: She is well-developed.      Comments: Oriented to time, place, and person.   Cardiovascular:      Comments: DP and PT pulses are palpable bilaterally. 3 sec capillary refill time and toes and feet are warm to touch proximally .  There is  hair growth on the feet and toes b/l. There is no edema b/l. No spider veins or varicosities present b/l.     Musculoskeletal:      Comments: Equinus noted b/l ankles with < 10 deg DF noted. MMT 5/5 in DF/PF/Inv/Ev resistance with no reproduction of pain in any direction. Passive range of motion of ankle and pedal joints is painless b/l.     Feet:      Right foot:      Skin integrity: No callus or dry skin.      Left foot:      Skin integrity: No callus or dry skin.   Lymphadenopathy:      Comments: Negative lymphadenopathy bilateral popliteal fossa  and tarsal tunnel.   Skin:     Comments: See wound description      Neurological:      Mental Status: She is alert.      Comments: Light touch, proprioception, and sharp/dull sensation are all intact bilaterally. Protective threshold with the Dongola-Wienstein monofilament is intact bilaterally.    Psychiatric:         Behavior: Behavior is cooperative.       Vitals:    03/17/23 1541   BP: 136/70   Pulse: 80   Temp: 97.2 °F (36.2 °C)       Assessment:           ICD-10-CM ICD-9-CM   1. Left midfoot ulcer, with necrosis of muscle  L97.423 707.14     728.89   2. Open wound of left foot  S91.302A 892.0            (Retired) Wound 04/15/22 2230 Diabetic Ulcer Left medial;plantar Foot (Active)   04/15/22 2230    Pre-existing: Yes   Primary Wound Type: Diabetic ulc   Side: Left   Orientation: medial;plantar   Location: Foot   Wound Number:    Ankle-Brachial Index:    Pulses:    Removal Indication and Assessment: Other (see comments)   Wound Outcome:    (Retired) Wound Type:    (Retired) Wound Length (cm):    (Retired) Wound Width (cm):    (Retired) Depth (cm):    Wound Description (Comments):    Removal Indications:    Wound WDL ex 03/17/23 1603   Dressing Appearance Dry;Moist drainage 03/17/23 1603   Drainage Amount Small 03/17/23 1603   Drainage Characteristics/Odor Serous 03/17/23 1603   Appearance Yellow 03/17/23 1603   Tissue loss description Partial thickness 03/17/23 1603   Red (%), Wound Tissue Color 100 % 03/17/23 1603   Periwound Area Intact;Dry 03/17/23 1603   Wound Edges Defined 03/17/23 1603   Wound Length (cm) 6.5 cm 03/17/23 1603   Wound Width (cm) 6.3 cm 03/17/23 1603   Wound Depth (cm) 1.2 cm 03/17/23 1603   Wound Volume (cm^3) 49.14 cm^3 03/17/23 1603   Wound Surface Area (cm^2) 40.95 cm^2 03/17/23 1603   Care Cleansed with:;Antimicrobial agent;Sterile normal saline 03/17/23 1603   Dressing Changed 03/17/23 1603   Off Loading Football dressing;Off loading shoe 03/17/23 1603   Dressing Change Due 03/22/23  03/17/23 1603           Plan:            Debridement: see procedure note   Dressings: per above  Offloading:Foam    Follow-up:Patient is to return to the clinic in 1 week  for follow-up but [facility] immediately if any signs of infection, such as fever, chills, sweats, increased redness or pain.    Short-term goals include maintaining good offloading and minimizing bioburden, promoting granulation and epithelialization to healing.  Long-term goals include keeping the wound healed by good offloading and medical management under the direction of internist.    Tissue pathology and/or culture taken     [] Yes      [x] No  Sharp debridement performed                   [x] Yes       [] No  Labs ordered     [] Yes       [x] No  Imaging ordered    [] Yes      [x] No    Orders Placed This Encounter   Procedures    Ambulatory referral/consult to General Surgery     Standing Status:   Future     Standing Expiration Date:   4/17/2024     Referral Priority:   Routine     Referral Type:   Consultation     Referral Reason:   Specialty Services Required     Referred to Provider:   Gabe Munoz MD     Requested Specialty:   General Surgery     Number of Visits Requested:   1    Ambulatory referral/consult to Home Health     Standing Status:   Future     Standing Expiration Date:   4/17/2024     Referral Priority:   Routine     Referral Type:   Home Health     Referral Reason:   Specialty Services Required     Requested Specialty:   Home Health Services     Number of Visits Requested:   1    SUBSEQUENT HOME HEALTH ORDERS     Order Specific Question:   What Home Health Agency is the patient currently using?     Answer:   [facility]    Change dressing    Change dressing     Gentian violet to plantar foot   Aquacel extra 4 x 5 folded   Drawtex cut to fit stacked   5 x 9 Mextra across wound   Three cast padding football with Renée harmon    SUBSEQUENT HOME HEALTH ORDERS     HH to see M,W,F except for day comes to  clinic every 2 weeks Next visit  3/22/23  Gentian violet to plantar foot   Aquacel extra 4 x 5 folded   Drawtex cut to fit stacked   5 x 9 Mextra across wound   Three cast padding football with Renée harmon     Order Specific Question:   What Home Health Agency is the patient currently using?     Answer:   Other/External     Comments:   [facility]        Follow up in about 5 days (around 3/22/2023).

## 2023-03-17 NOTE — PROGRESS NOTES
[facility]                Progress Note    Subjective:       Patient ID: Audrey Natarajan is a 52 y.o. female.    Chief Complaint: No chief complaint on file.    HPI    Review of Systems      Objective:        Physical Exam    There were no vitals filed for this visit.    Assessment:         No diagnosis found.             Plan:              Tissue pathology and/or culture taken     [] Yes      [] No  Sharp debridement performed                   [] Yes       [] No  Labs ordered     [] Yes       [] No  Imaging ordered    [] Yes      [] No    No orders of the defined types were placed in this encounter.       Follow up in about 2 weeks (around 3/31/2023).

## 2023-03-19 NOTE — PROCEDURES
Wound Debridement    Date/Time: 3/17/2023 1:50 PM  Performed by: Halle Gill DPM  Authorized by: Halle Gill DPM     Consent Done?:  Yes (Written)    Wound Details:    Location:  Left foot    Location:  Left Plantar    Type of Debridement:  Excisional       Length (cm):  6.5       Area (sq cm):  40.95       Width (cm):  6.3       Percent Debrided (%):  100       Depth (cm):  1.2       Total Area Debrided (sq cm):  40.95    Depth of debridement:  Subcutaneous tissue    Tissue debrided:  Dermis    Devitalized tissue debrided:  Biofilm, Exudate and Fibrin    Instruments:  Curette  Bleeding:  Minimal  Hemostasis Achieved: Yes  Method Used:  Pressure  Patient tolerance:  Patient tolerated the procedure well with no immediate complications

## 2023-03-20 ENCOUNTER — PATIENT OUTREACH (OUTPATIENT)
Dept: ADMINISTRATIVE | Facility: OTHER | Age: 53
End: 2023-03-20
Payer: MEDICAID

## 2023-03-20 ENCOUNTER — TELEPHONE (OUTPATIENT)
Dept: SURGERY | Facility: CLINIC | Age: 53
End: 2023-03-20
Payer: MEDICAID

## 2023-03-20 NOTE — PROGRESS NOTES
CHW - Follow Up    This Community Health Worker completed a follow up visit with patient via telephone today.  Pt/Caregiver reported: Patient has food insecurities.  Community Health Worker provided: referrals for food pantries, pt stated she gets [employer], will f/u with pt in one week to check on status of referrals and pt well-being.  Follow up required: yes.  Follow-up Outreach - Due: 3/26/2023

## 2023-03-20 NOTE — TELEPHONE ENCOUNTER
Spoke with  regarding referral. Appt scheduled for 3/23 at 11:30 with . Pt. confirmed date and time.

## 2023-03-22 ENCOUNTER — HOSPITAL ENCOUNTER (OUTPATIENT)
Dept: WOUND CARE | Facility: HOSPITAL | Age: 53
Discharge: HOME | End: 2023-03-22
Attending: FAMILY MEDICINE
Payer: MEDICAID

## 2023-03-22 ENCOUNTER — OFFICE VISIT (OUTPATIENT)
Dept: INFECTIOUS DISEASES | Facility: CLINIC | Age: 53
End: 2023-03-22
Payer: MEDICAID

## 2023-03-22 VITALS
TEMPERATURE: 98 F | OXYGEN SATURATION: 97 % | HEIGHT: 66 IN | DIASTOLIC BLOOD PRESSURE: 69 MMHG | SYSTOLIC BLOOD PRESSURE: 128 MMHG | BODY MASS INDEX: 37.07 KG/M2 | WEIGHT: 230.69 LBS | HEART RATE: 107 BPM

## 2023-03-22 VITALS — SYSTOLIC BLOOD PRESSURE: 121 MMHG | DIASTOLIC BLOOD PRESSURE: 57 MMHG | HEART RATE: 99 BPM | TEMPERATURE: 98 F

## 2023-03-22 DIAGNOSIS — M14.679: ICD-10-CM

## 2023-03-22 DIAGNOSIS — E11.621 DIABETIC ULCER OF LEFT MIDFOOT ASSOCIATED WITH TYPE 2 DIABETES MELLITUS, LIMITED TO BREAKDOWN OF SKIN: Primary | ICD-10-CM

## 2023-03-22 DIAGNOSIS — S91.302A OPEN WOUND OF LEFT FOOT: Primary | ICD-10-CM

## 2023-03-22 DIAGNOSIS — E11.49 TYPE II DIABETES MELLITUS WITH NEUROLOGICAL MANIFESTATIONS: ICD-10-CM

## 2023-03-22 DIAGNOSIS — L97.421 DIABETIC ULCER OF LEFT MIDFOOT ASSOCIATED WITH TYPE 2 DIABETES MELLITUS, LIMITED TO BREAKDOWN OF SKIN: Primary | ICD-10-CM

## 2023-03-22 PROCEDURE — 99999 PR PBB SHADOW E&M-EST. PATIENT-LVL V: CPT | Mod: PBBFAC,,,

## 2023-03-22 PROCEDURE — 3074F PR MOST RECENT SYSTOLIC BLOOD PRESSURE < 130 MM HG: ICD-10-PCS | Mod: CPTII,,, | Performed by: INTERNAL MEDICINE

## 2023-03-22 PROCEDURE — 4010F ACE/ARB THERAPY RXD/TAKEN: CPT | Mod: CPTII,,, | Performed by: INTERNAL MEDICINE

## 2023-03-22 PROCEDURE — 3078F PR MOST RECENT DIASTOLIC BLOOD PRESSURE < 80 MM HG: ICD-10-PCS | Mod: CPTII,,, | Performed by: INTERNAL MEDICINE

## 2023-03-22 PROCEDURE — 1111F DSCHRG MED/CURRENT MED MERGE: CPT | Mod: CPTII,,, | Performed by: INTERNAL MEDICINE

## 2023-03-22 PROCEDURE — 11042 DEBRIDEMENT: ICD-10-PCS | Mod: ,,, | Performed by: FAMILY MEDICINE

## 2023-03-22 PROCEDURE — 3051F HG A1C>EQUAL 7.0%<8.0%: CPT | Mod: CPTII,,, | Performed by: INTERNAL MEDICINE

## 2023-03-22 PROCEDURE — 1159F MED LIST DOCD IN RCRD: CPT | Mod: CPTII,,, | Performed by: INTERNAL MEDICINE

## 2023-03-22 PROCEDURE — 11045 DEBRIDEMENT: ICD-10-PCS | Mod: ,,, | Performed by: FAMILY MEDICINE

## 2023-03-22 PROCEDURE — 11045 DBRDMT SUBQ TISS EACH ADDL: CPT

## 2023-03-22 PROCEDURE — 3051F PR MOST RECENT HEMOGLOBIN A1C LEVEL 7.0 - < 8.0%: ICD-10-PCS | Mod: CPTII,,, | Performed by: INTERNAL MEDICINE

## 2023-03-22 PROCEDURE — 99214 PR OFFICE/OUTPT VISIT, EST, LEVL IV, 30-39 MIN: ICD-10-PCS | Mod: S$PBB,25,, | Performed by: INTERNAL MEDICINE

## 2023-03-22 PROCEDURE — 4010F PR ACE/ARB THEARPY RXD/TAKEN: ICD-10-PCS | Mod: CPTII,,, | Performed by: INTERNAL MEDICINE

## 2023-03-22 PROCEDURE — 11042 DBRDMT SUBQ TIS 1ST 20SQCM/<: CPT | Mod: ,,, | Performed by: FAMILY MEDICINE

## 2023-03-22 PROCEDURE — 1159F PR MEDICATION LIST DOCUMENTED IN MEDICAL RECORD: ICD-10-PCS | Mod: CPTII,,, | Performed by: INTERNAL MEDICINE

## 2023-03-22 PROCEDURE — 99214 OFFICE O/P EST MOD 30 MIN: CPT | Mod: S$PBB,25,, | Performed by: INTERNAL MEDICINE

## 2023-03-22 PROCEDURE — 1111F PR DISCHARGE MEDS RECONCILED W/ CURRENT OUTPATIENT MED LIST: ICD-10-PCS | Mod: CPTII,,, | Performed by: INTERNAL MEDICINE

## 2023-03-22 PROCEDURE — 3008F PR BODY MASS INDEX (BMI) DOCUMENTED: ICD-10-PCS | Mod: CPTII,,, | Performed by: INTERNAL MEDICINE

## 2023-03-22 PROCEDURE — 3078F DIAST BP <80 MM HG: CPT | Mod: CPTII,,, | Performed by: INTERNAL MEDICINE

## 2023-03-22 PROCEDURE — 99214 OFFICE O/P EST MOD 30 MIN: CPT | Mod: 25,,, | Performed by: FAMILY MEDICINE

## 2023-03-22 PROCEDURE — 11042 DBRDMT SUBQ TIS 1ST 20SQCM/<: CPT | Performed by: FAMILY MEDICINE

## 2023-03-22 PROCEDURE — 3008F BODY MASS INDEX DOCD: CPT | Mod: CPTII,,, | Performed by: INTERNAL MEDICINE

## 2023-03-22 PROCEDURE — 11045 DBRDMT SUBQ TISS EACH ADDL: CPT | Mod: ,,, | Performed by: FAMILY MEDICINE

## 2023-03-22 PROCEDURE — 99215 OFFICE O/P EST HI 40 MIN: CPT | Mod: PBBFAC,25

## 2023-03-22 PROCEDURE — 99214 PR OFFICE/OUTPT VISIT, EST, LEVL IV, 30-39 MIN: ICD-10-PCS | Mod: 25,,, | Performed by: FAMILY MEDICINE

## 2023-03-22 PROCEDURE — 3074F SYST BP LT 130 MM HG: CPT | Mod: CPTII,,, | Performed by: INTERNAL MEDICINE

## 2023-03-22 PROCEDURE — 99999 PR PBB SHADOW E&M-EST. PATIENT-LVL V: ICD-10-PCS | Mod: PBBFAC,,,

## 2023-03-22 NOTE — PROGRESS NOTES
[facility]                Progress Note    Subjective:       Patient ID: Audrey Natarajan is a 52 y.o. female.    Chief Complaint: Wound Check    Left midfoot DFU; charcot foot. Patient states she is scheduled to see ID to discuss antibiotic continuation. She is also scheduled to see surgery - potential amputation.   To clinic in w/c accompanied by friend. States pain 5/10 about usual. Less maceration this week. States that HH came out for change and had to change dsg this am but only had gauze to change. Wound protruding through skin and continues to have depth of 1 cm in center. Continue HH M W [facility]. Will see infectious disease today and surgery to discuss amputation in am.     Wound Check    This is an established patient in wound care.   Patient presents in the office today for evaluation of the chronic wound.  Updated HPI is noted below.    The periwound appears non-erythematous, macerated, edematous    The wound appears stable; protruding wound 2/2 charcot foot. No odor. Fibrin and slough in wound bed. Periwound callus. Serous drainage.     Patient denies fever, chills    Patients reports wound pain    Lymphedema status is contributory to wound status    Pain at the site of the wound is aching    Contributing factors to current wound state include numerous comorbid conditions, Diabetes Type 2, Hypertension, off-loading limitations    Review of Systems   Constitutional:  Negative for activity change, appetite change and fever.   HENT:  Negative for congestion.    Respiratory:  Negative for shortness of breath and wheezing.    Cardiovascular:  Negative for chest pain and leg swelling.   Gastrointestinal:  Negative for abdominal pain, nausea and vomiting.   Skin:  Positive for wound.   Neurological:  Negative for dizziness and headaches.   Psychiatric/Behavioral:  The patient is not nervous/anxious.        Objective:        Physical Exam  Vitals and nursing note  reviewed.   Constitutional:       General: She is not in acute distress.     Appearance: She is well-developed.   HENT:      Head: Normocephalic and atraumatic.   Eyes:      Conjunctiva/sclera: Conjunctivae normal.   Pulmonary:      Effort: Pulmonary effort is normal.   Abdominal:      General: There is no distension.   Musculoskeletal:         General: Normal range of motion.      Cervical back: Normal range of motion.   Skin:     General: Skin is warm.      Comments: See wound description   Neurological:      Mental Status: She is alert and oriented to person, place, and time.   Psychiatric:         Behavior: Behavior normal.         Thought Content: Thought content normal.         Judgment: Judgment normal.       Vitals:    03/22/23 0900   BP: (!) 121/57   Pulse: 99   Temp: 98.2 °F (36.8 °C)       Assessment:           ICD-10-CM ICD-9-CM   1. Open wound of left foot  S91.302A 892.0   2. Charcot's joint of foot  M14.679 349.9     713.5            (Retired) Wound 04/15/22 2230 Diabetic Ulcer Left medial;plantar Foot (Active)   04/15/22 2230    Pre-existing: Yes   Primary Wound Type: Diabetic ulc   Side: Left   Orientation: medial;plantar   Location: Foot   Wound Number:    Ankle-Brachial Index:    Pulses:    Removal Indication and Assessment: Other (see comments)   Wound Outcome:    (Retired) Wound Type:    (Retired) Wound Length (cm):    (Retired) Wound Width (cm):    (Retired) Depth (cm):    Wound Description (Comments):    Removal Indications:    Wound Image   03/22/23 0911   Wound WDL ex 03/22/23 0911   Dressing Appearance Dry;Intact 03/22/23 0911   Drainage Amount Large 03/22/23 0911   Drainage Characteristics/Odor Serous;Clear 03/22/23 0911   Appearance Red 03/22/23 0911   Tissue loss description Full thickness 03/22/23 0911   Red (%), Wound Tissue Color 100 % 03/22/23 0911   Periwound Area Macerated 03/22/23 0911   Wound Edges Defined 03/22/23 0911   Wound Length (cm) 6.5 cm 03/22/23 0911   Wound Width (cm)  6.5 cm 03/22/23 0911   Wound Depth (cm) 1 cm 03/22/23 0911   Wound Volume (cm^3) 42.25 cm^3 03/22/23 0911   Wound Surface Area (cm^2) 42.25 cm^2 03/22/23 0911   Care Cleansed with:;Antimicrobial agent;Sterile normal saline 03/22/23 0911   Dressing Changed 03/22/23 0911   Off Loading Football dressing;Off loading shoe 03/22/23 0911   Dressing Change Due 03/29/23 03/22/23 0911           Debridement    Date/Time: 3/22/2023 8:40 AM  Performed by: Hanh Wells MD  Authorized by: Hanh Wells MD     Consent Done?:  Yes (Verbal)  Local anesthesia used?: Yes    Local anesthetic:  Topical anesthetic    Type of Debridement:  Excisional       Length (cm):  6.5       Area (sq cm):  42.25       Width (cm):  6.5       Percent Debrided (%):  100       Depth (cm):  1       Total Area Debrided (sq cm):  42.25    Depth of debridement:  Subcutaneous tissue    Tissue debrided:  Subcutaneous    Devitalized tissue debrided:  Slough    Instruments:  Curette  Patient tolerance:  Patient tolerated the procedure well with no immediate complications    Plan:            Debridement not needed and Not Done today.   Recommend off-loading / avoid weight bearing activities   Increase protein   Elevate legs   Monitor blood glucose  Control blood pressure  Keep dressing clean and intact  Continue with wound care orders and plan as noted in orders.   Continue to follow current medication regimen as per pcp   Call for any questions / concerns.       Tissue pathology and/or culture taken     [] Yes      [x] No  Sharp debridement performed                   [x] Yes       [] No  Labs ordered     [] Yes       [x] No  Imaging ordered    [] Yes      [x] No    Orders Placed This Encounter   Procedures    SUBSEQUENT HOME HEALTH ORDERS     HH to see M,W,F except for day comes to clinic.  Next appointment 3/31/23  Gentian violet to plantar foot   Aquacel extra 4 x 5 folded   Drawtex cut to fit stacked   5 x 9 Mextra across wound   Three cast padding football  with Renée harmon  Any deviation from dressing call      Order Specific Question:   What Home Health Agency is the patient currently using?     Answer:   Other/External     Comments:   ACTS    Change dressing     HH to see M,W,F except for day comes to clinic.  Next appointment 3/31/23  Gentian violet to plantar foot   Aquacel extra 4 x 5 folded   Drawtex cut to fit stacked   5 x 9 Mextra across wound   Three cast padding football with Renée harmon        Follow up in about 9 days (around 3/31/2023).

## 2023-03-22 NOTE — PROGRESS NOTES
INFECTIOUS DISEASE CLINIC  3/22/23     Subjective:      Chief Complaint:   Chief Complaint   Patient presents with    Follow-up     Follow up on wound on left foot.        History of Present Illness:    Patient Audrey Natarajan is a 52 y.o. female who presents today for follow up of foot infection. Last seen in the hospital and d/c'd on po cipro/doxy. Reports compliance. Has a few days left. MRI in hospital without abscess and possible OM. Underwent bone biospy- path and cultures negative for osteomyelitis. Reports compliance with wound care. Denies any worsening of foot ulcer, but says it hasn't had any improvement with the antibiotics (IV vancomycin at [facility], inpatient iv abx, and then cipro/doxy). Reluctant, but agreeable to amputation. Stopped smoking since being in hospital. Wanting to know more about hyperbarics. Says she's using scooter to offload pressure on L foot.       Review of Symptoms:  Constitutional: Denies fevers, chills, or weakness.  ENT: Denies dysphagia, nasal discharge, ear pain or discharge.  Cardiovascular: Denies chest pain, palpitations, orthopnea, or claudication.  Respiratory: Denies shortness of breath, cough, hemoptysis, or wheezing.  GI: Denies nausea/vomitting, hematochezia, melena, abd pain, or changes in appetite.  : Denies dysuria, incontinence, or hematuria.  Musculoskeletal: Denies joint pain or myalgias.  Skin/breast: Denies rashes, lumps, lesions, or discharge.  Neurologic: Denies headache, dizziness, vertigo, or paresthesias.    Past Medical History:   Diagnosis Date    ADHD (attention deficit hyperactivity disorder)     Arthritis     Asthma     Bipolar 1 disorder     Cataract     Cigarette smoker     COPD (chronic obstructive pulmonary disease)     Coronary artery disease     A fib    Depression     bipolar manic depresson    Diabetes mellitus     Diabetic foot ulcers     Diabetic neuropathy     DVT of lower extremity, bilateral 07/2013    bilateral LE DVT. Estelita  "filter placed.     Encounter for blood transfusion     History of blood clots 1. Left Leg=2003; 2.Bilateral Groin=Blood Clots= 5 or 6/ 2013 & 7/2013; 3. LLL of Lung=7/2013;  4. Lt. Lower Leg=7/2013.     Pt. had 1st Blood Clot after Aqwmvtyawrsd=[id number], & Last=2013. Estelita Filter= Rt.Lateral Neck.    HTN (hypertension) 06/06/2013    Pt states that she does not have hypertension    Hypercholesteremia     Irregular heartbeat     Neuromuscular disorder     neuropathy feet    Obese     PE (pulmonary embolism) 07/2013    bilat LE DVT.     Restless leg syndrome        Past Surgical History:   Procedure Laterality Date    ABDOMINAL SURGERY  2010    gastric sleeve    BILATERAL OOPHORECTOMY Bilateral 1/12/2015    CHOLECYSTECTOMY      DEBRIDEMENT OF FOOT Bilateral 5/10/2022    Procedure: DEBRIDEMENT, FOOT;  Surgeon: Maira De Los Santos DPM;  Location: [facility] OR;  Service: Podiatry;  Laterality: Bilateral;    DEBRIDEMENT OF FOOT Left 2/28/2023    Procedure: DEBRIDEMENT, FOOT,biopsy;  Surgeon: Maira De Los Santos DPM;  Location: [facility] OR;  Service: Podiatry;  Laterality: Left;  request misonix, wound VAC, possible neoxx    Green' s filter Right 7/4/2012    Right Neck & Tunneled Down.    HERNIA REPAIR      "[place] of Hernias Repaires around th Belly Button.", pt. states    INCISION AND DRAINAGE FOOT Left 12/24/2022    Procedure: INCISION AND DRAINAGE, FOOT;  Surgeon: Fahad Razo DPM;  Location: [facility] OR;  Service: Podiatry;  Laterality: Left;    LAPAROSCOPIC CHOLECYSTECTOMY N/A 9/10/2020    Procedure: CHOLECYSTECTOMY, LAPAROSCOPIC;  Surgeon: Montrell Gutierrez MD;  Location: [facility] OR;  Service: General;  Laterality: N/A;  RN PREOP 9/9----COVID Negative  9/9    OVARIAN CYST REMOVAL  3/13/2014    RI REMOVAL OF OVARY/TUBE(S)      SPLENECTOMY, TOTAL  July 2003    TONSILLECTOMY      as a child    TYMPANOSTOMY TUBE PLACEMENT  1976    VEIN SURGERY  2003    Lt leg       Family History   Problem Relation Age of Onset    Hypertension Father     Diabetes " Father     Heart disease Father     Cataracts Father     Diabetes Paternal Grandfather     Heart disease Paternal Grandfather     No Known Problems Mother     Ovarian cancer Maternal Grandmother          from this. ? age     No Known Problems Sister     No Known Problems Brother     No Known Problems Maternal Aunt     No Known Problems Maternal Uncle     No Known Problems Paternal Aunt     No Known Problems Paternal Uncle     No Known Problems Maternal Grandfather     Ovarian cancer Paternal Grandmother     Uterine cancer Neg Hx     Breast cancer Neg Hx     Colon cancer Neg Hx     Amblyopia Neg Hx     Blindness Neg Hx     Cancer Neg Hx     Glaucoma Neg Hx     Macular degeneration Neg Hx     Retinal detachment Neg Hx     Strabismus Neg Hx     Stroke Neg Hx     Thyroid disease Neg Hx        Social History     Socioeconomic History    Marital status: Significant Other   Tobacco Use    Smoking status: Every Day     Packs/day: 0.50     Years: 37.00     Pack years: 18.50     Types: Cigarettes     Last attempt to quit: 2020     Years since quittin.2    Smokeless tobacco: Current    Tobacco comments:     Enrolled in the [employer] on 5/3/14 ([facility] Member ID # [id number]). Ambulatory referral to Smoking Cessation Program   Substance and Sexual Activity    Alcohol use: No     Alcohol/week: 0.0 standard drinks    Drug use: No    Sexual activity: Yes     Partners: Male   Social History Narrative     from her  of 20 years    Lives by herself since 2019     Social Determinants of Health     Financial Resource Strain: Medium Risk    Difficulty of Paying Living Expenses: Somewhat hard   Food Insecurity: Food Insecurity Present    Worried About Running Out of Food in the Last Year: Often true    Ran Out of Food in the Last Year: Often true   Transportation Needs: Unmet Transportation Needs    Lack of Transportation (Medical): Yes    Lack of Transportation (Non-Medical): Yes   Physical Activity:  Inactive    Days of Exercise per Week: 0 days    Minutes of Exercise per Session: 0 min   Stress: Stress Concern Present    Feeling of Stress : To some extent   Social Connections: Moderately Isolated    Frequency of Communication with Friends and Family: More than three times a week    Frequency of Social Gatherings with Friends and Family: More than three times a week    Attends [employer] Services: 1 to 4 times per year    Active Member of Clubs or Organizations: No    Attends Club or Organization Meetings: Never    Marital Status: Never    Housing Stability: Low Risk     Unable to Pay for Housing in the Last Year: No    Number of Places Lived in the Last Year: 1    Unstable Housing in the Last Year: No       Review of patient's allergies indicates:   Allergen Reactions    Morphine Other (See Comments)     Patient had a psychotic episode after taking Morphine  Agitation, hallucinations    Penicillins Anaphylaxis     Tolerated cephalosporins in the past    Januvia [sitagliptin] Hives    Carbamazepine Other (See Comments)     hyponatremia         Objective:   VS (24h):   Vitals:    03/22/23 1007   BP: 128/69   Pulse: 107   Temp: 98.3 °F (36.8 °C)     [unfilled]  General: Afebrile, alert, comfortable, no acute distress.   HEENT: RAOUL. EOMI, no scleral icterus.   Pulmonary: Non labored,clear to auscultation A/P/L. No wheezing, crackles, or rhonchi.  Cardiac: normal S1 & S2 w/o rubs/murmurs/gallops. No JVD.   Abdominal: Non-tender, non-distended.Bowel sounds present x 4.   Extremities: Moves all extremities x 4. No swelling  Skin: L foot with charcot foot with large skin defect on bottom surface. See photo  Neurological:  Alert and oriented x 4.       Labs:    Glucose   Date Value Ref Range Status   03/15/2023 260 (H) 70 - 110 mg/dL Final   03/05/2023 150 (H) 70 - 110 mg/dL Final   03/04/2023 143 (H) 70 - 110 mg/dL Final       Calcium   Date Value Ref Range Status   03/15/2023 8.8 8.7 - 10.5 mg/dL Final    03/05/2023 9.6 8.7 - 10.5 mg/dL Final   03/04/2023 7.8 (L) 8.7 - 10.5 mg/dL Final       Albumin   Date Value Ref Range Status   03/15/2023 3.0 (L) 3.5 - 5.2 g/dL Final   03/05/2023 3.0 (L) 3.5 - 5.2 g/dL Final   03/04/2023 2.6 (L) 3.5 - 5.2 g/dL Final       Total Protein   Date Value Ref Range Status   03/15/2023 6.5 6.0 - 8.4 g/dL Final   03/05/2023 6.6 6.0 - 8.4 g/dL Final   03/04/2023 5.9 (L) 6.0 - 8.4 g/dL Final       Sodium   Date Value Ref Range Status   03/15/2023 142 136 - 145 mmol/L Final   03/05/2023 138 136 - 145 mmol/L Final   03/04/2023 141 136 - 145 mmol/L Final       Potassium   Date Value Ref Range Status   03/15/2023 4.1 3.5 - 5.1 mmol/L Final   03/05/2023 4.0 3.5 - 5.1 mmol/L Final   03/04/2023 4.1 3.5 - 5.1 mmol/L Final     Comment:     Specimen moderately hemolyzed       CO2   Date Value Ref Range Status   03/15/2023 23 23 - 29 mmol/L Final   03/05/2023 30 (H) 23 - 29 mmol/L Final   03/04/2023 23 23 - 29 mmol/L Final       Chloride   Date Value Ref Range Status   03/15/2023 100 95 - 110 mmol/L Final   03/05/2023 98 95 - 110 mmol/L Final   03/04/2023 108 95 - 110 mmol/L Final       BUN   Date Value Ref Range Status   03/15/2023 14 6 - 20 mg/dL Final   03/05/2023 15 6 - 20 mg/dL Final   03/04/2023 11 6 - 20 mg/dL Final       Creatinine   Date Value Ref Range Status   03/15/2023 0.9 0.5 - 1.4 mg/dL Final   03/05/2023 0.7 0.5 - 1.4 mg/dL Final   03/04/2023 0.6 0.5 - 1.4 mg/dL Final       Alkaline Phosphatase   Date Value Ref Range Status   03/15/2023 80 55 - 135 U/L Final   03/05/2023 96 55 - 135 U/L Final   03/04/2023 84 55 - 135 U/L Final       ALT   Date Value Ref Range Status   03/15/2023 8 (L) 10 - 44 U/L Final   03/05/2023 18 10 - 44 U/L Final   03/04/2023 19 10 - 44 U/L Final       AST   Date Value Ref Range Status   03/15/2023 8 (L) 10 - 40 U/L Final   03/05/2023 9 (L) 10 - 40 U/L Final   03/04/2023 23 10 - 40 U/L Final       Total Bilirubin   Date Value Ref Range Status   03/15/2023 0.1  0.1 - 1.0 mg/dL Final     Comment:     For infants and newborns, interpretation of results should be based  on gestational age, weight and in agreement with clinical  observations.    Premature Infant recommended reference ranges:  Up to 24 hours.............<8.0 mg/dL  Up to 48 hours............<12.0 mg/dL  3-5 days..................<15.0 mg/dL  6-29 days.................<15.0 mg/dL     03/05/2023 0.6 0.1 - 1.0 mg/dL Final     Comment:     For infants and newborns, interpretation of results should be based  on gestational age, weight and in agreement with clinical  observations.    Premature Infant recommended reference ranges:  Up to 24 hours.............<8.0 mg/dL  Up to 48 hours............<12.0 mg/dL  3-5 days..................<15.0 mg/dL  6-29 days.................<15.0 mg/dL     03/04/2023 0.3 0.1 - 1.0 mg/dL Final     Comment:     For infants and newborns, interpretation of results should be based  on gestational age, weight and in agreement with clinical  observations.    Premature Infant recommended reference ranges:  Up to 24 hours.............<8.0 mg/dL  Up to 48 hours............<12.0 mg/dL  3-5 days..................<15.0 mg/dL  6-29 days.................<15.0 mg/dL         WBC   Date Value Ref Range Status   03/15/2023 19.77 (H) 3.90 - 12.70 K/uL Final   03/05/2023 14.92 (H) 3.90 - 12.70 K/uL Final   03/04/2023 19.71 (H) 3.90 - 12.70 K/uL Final       Hemoglobin   Date Value Ref Range Status   03/15/2023 10.9 (L) 12.0 - 16.0 g/dL Final   03/05/2023 10.5 (L) 12.0 - 16.0 g/dL Final   03/04/2023 12.0 12.0 - 16.0 g/dL Final       POC Hematocrit   Date Value Ref Range Status   04/21/2022 32 (L) 36 - 54 %PCV Final   02/10/2021 37 36 - 54 %PCV Final   01/12/2015 44 36 - 54 %PCV Final     Hematocrit   Date Value Ref Range Status   03/15/2023 35.5 (L) 37.0 - 48.5 % Final   03/05/2023 34.2 (L) 37.0 - 48.5 % Final   03/04/2023 39.2 37.0 - 48.5 % Final       MCV   Date Value Ref Range Status   03/15/2023 76 (L) 82  - 98 fL Final   03/05/2023 75 (L) 82 - 98 fL Final   03/04/2023 75 (L) 82 - 98 fL Final       Platelets   Date Value Ref Range Status   03/15/2023 631 (H) 150 - 450 K/uL Final   03/05/2023 633 (H) 150 - 450 K/uL Final   03/04/2023 699 (H) 150 - 450 K/uL Final       Lab Results   Component Value Date    CHOL 109 04/19/2021    CHOL 185 06/16/2020    CHOL 250 (H) 01/30/2019       Lab Results   Component Value Date    HDL 46 04/19/2021    HDL 49 06/16/2020    HDL 50 01/30/2019       Lab Results   Component Value Date    LDLCALC 43 04/19/2021    LDLCALC 83.2 06/16/2020    LDLCALC 144.4 01/30/2019       Lab Results   Component Value Date    TRIG 98 04/19/2021    TRIG 264 (H) 06/16/2020    TRIG 278 (H) 01/30/2019       Lab Results   Component Value Date    CHOLHDL 2.37 04/19/2021    CHOLHDL 26.5 06/16/2020    CHOLHDL 20.0 01/30/2019       RPR   Date Value Ref Range Status   02/14/2021 Non-reactive Non-reactive Final   10/01/2020 Non-reactive Non-reactive Final     No results found for: QUANTIFERON    Medications:  Current Outpatient Medications on File Prior to Visit   Medication Sig Dispense Refill    acetaminophen (TYLENOL) 500 MG tablet Take 2 tablets (1,000 mg total) by mouth every 6 (six) hours as needed for Pain. 30 tablet 0    albuterol (PROVENTIL/VENTOLIN HFA) 90 mcg/actuation inhaler INHALE 2 PUFFS INTO THE LUNGS EVERY 6 HOURS AS NEEDED FOR WHEEZING. RESCUE 6.7 g 2    albuterol-ipratropium (DUO-NEB) 2.5 mg-0.5 mg/3 mL nebulizer solution Take 3 mLs by nebulization every 6 (six) hours as needed for Wheezing or Shortness of Breath. Rescue 1 each 0    ammonium lactate (LAC-HYDRIN) 12 % lotion APPL Y ONCE TOPICALLY TWICE DAILY FOR 30 DAYS 225 g 0    apixaban (ELIQUIS) 5 mg Tab Take 1 tablet (5 mg total) by mouth 2 (two) times daily. 60 tablet 0    ASPERCREME, LIDOCAINE, 4 % PtMd Apply 1 patch topically.      aspirin 81 MG Chew Take 1 tablet (81 mg total) by mouth once daily. 30 tablet 0    BIOFREEZE, MENTHOL, TOP Apply  topically.      bumetanide (BUMEX) 1 MG tablet Take 1 tablet (1 mg total) by mouth once daily. 30 tablet 5    ciprofloxacin HCl (CIPRO) 750 MG tablet Take 1 tablet (750 mg total) by mouth every 12 (twelve) hours. for 21 days 42 tablet 0    cyanocobalamin (VITAMIN B-12) 1000 MCG tablet Take 100 mcg by mouth once daily.      divalproex (DEPAKOTE) 500 MG TbEC Take 1 tablet (500 mg total) by mouth once daily. PO QAM 30 tablet 11    doxycycline (VIBRA-TABS) 100 MG tablet Take 1 tablet (100 mg total) by mouth 2 (two) times daily. for 21 days 42 tablet 0    DUPIXENT  mg/2 mL PnIj Inject into the skin.      ferrous sulfate 325 (65 FE) MG EC tablet Take 1 tablet (325 mg total) by mouth once daily. 30 tablet 0    fluticasone propionate (FLONASE) 50 mcg/actuation nasal spray 2 sprays (100 mcg total) by Each Nostril route daily as needed (Nasal congestion). 9.9 mL 0    fluticasone-salmeterol diskus inhaler 250-50 mcg Inhale 1 puff into the lungs 2 (two) times daily. Controller 60 each 0    hydrOXYzine (ATARAX) 50 MG tablet Take 0.5 tablets (25 mg total) by mouth 4 (four) times daily as needed for Itching or Anxiety.      LIDOcaine (LIDODERM) 5 % Place 1 patch onto the skin once daily. Remove & Discard patch within 12 hours or as directed by MD  0    lisinopriL 10 MG tablet Take 1 tablet (10 mg total) by mouth once daily. 30 tablet 0    loratadine (CLARITIN) 10 mg tablet Take 1 tablet (10 mg total) by mouth once daily. 90 tablet 3    metFORMIN (GLUCOPHAGE) 1000 MG tablet Take 1 tablet (1,000 mg total) by mouth 2 (two) times daily with meals. 180 tablet 1    methocarbamoL (ROBAXIN) 500 MG Tab Take 500 mg by mouth. Frequency could not be confirmed.      metoprolol tartrate (LOPRESSOR) 25 MG tablet Take 1 tablet (25 mg total) by mouth 2 (two) times daily. 60 tablet 0    multivitamin Tab Take 1 tablet by mouth once daily. 30 tablet 2    nicotine (NICODERM CQ) 14 mg/24 hr Place 1 patch onto the skin once daily. 28 patch 0     nystatin (NYSTOP) powder APPLY TO ABDOMINAL AND BREAST SKIN FOLD TWICE DAILY. 60 g 0    ondansetron (ZOFRAN) 8 MG tablet Take 1 tablet (8 mg total) by mouth every 8 (eight) hours as needed for Nausea. 20 tablet 0    pantoprazole (PROTONIX) 40 MG tablet Take 1 tablet (40 mg total) by mouth once daily. 30 tablet 0    polyethylene glycol (GLYCOLAX) 17 gram/dose powder Mix 1 capful (17 g) with fluids and drink by mouth once daily. 510 g 1    pravastatin (PRAVACHOL) 40 MG tablet Take 1 tablet (40 mg total) by mouth every evening. 30 tablet 0    risperiDONE (RISPERDAL M-TABS) 3 MG disintegrating tablet Take 1 tablet (3 mg total) by mouth 2 (two) times daily. 60 tablet 0    semaglutide (OZEMPIC SUBQ) Inject into the skin.      senna-docusate 8.6-50 mg (PERICOLACE) 8.6-50 mg per tablet Take 1 tablet by mouth once daily. 30 tablet 1    traMADoL (ULTRAM) 50 mg tablet Take 1 tablet (50 mg total) by mouth every 8 (eight) hours as needed for Pain (foot pain). 30 tablet 0    vitamin E 1000 UNIT capsule Take 1,000 Units by mouth once daily.      VYVANSE 40 mg Cap Take 40 mg by mouth once daily.      [DISCONTINUED] diclofenac sodium (VOLTAREN) 1 % Gel Apply 2 g topically 4 (four) times daily as needed (Apply to painful area up to 4 times a day as needed for pain). Apply to painful area 4 times a day as needed for pain 1 Tube 0    [DISCONTINUED] furosemide (LASIX) 20 MG tablet TAKE 1 TABLET(20 MG) BY MOUTH EVERY DAY 90 tablet 1    [DISCONTINUED] QUEtiapine (SEROQUEL) 200 MG Tab Take 1 tablet (200 mg total) by mouth before breakfast. 30 tablet 2     No current facility-administered medications on file prior to visit.       Antibiotics:   Antibiotics (From admission, onward)      None            HIV: No components found for: HIV 1/2 AG/AB  Hepatitis C IgG: No components found for: HEPATITIS C  Syphilis:   RPR   Date Value Ref Range Status   02/14/2021 Non-reactive Non-reactive Final   10/01/2020 Non-reactive Non-reactive Final        Hepatitis A IgG: No components found for: HEPATITIS A IGG  Hepatitis Bc IgG: No components found for: HEPATITIS B CORE IGG  Hepatitis Bs IgG:  Quantiferon: No results found for: QUANTIFERON  VZV IgG: No components found for: VARICELLA IGG    No components found for: SEDIMENTATION RATE  No components found for: C-REACTIVE PROTEIN      Microbiology x 7d:   Microbiology Results (last 7 days)       ** No results found for the last 168 hours. **            Immunization History   Administered Date(s) Administered    Hepatitis B, Adult 01/09/2014, 02/13/2014    Influenza 01/14/2015    Influenza - Quadrivalent 09/18/2015, 09/27/2022    Influenza - Quadrivalent - PF *Preferred* (6 months and older) 11/18/2016, 10/24/2017, 10/31/2018, 09/04/2019, 09/29/2020, 02/03/2022, 09/27/2022    Influenza - Trivalent - PF (ADULT) 01/14/2015    Pneumococcal Polysaccharide - 23 Valent 10/24/2017    Tdap 01/09/2014         Reviewed records today as well as relevant labs, cultures, and imaging    Assessment:     Charcot foot  Tobacco abuse  Dm  Pad  Dvt on eliquis    No toxicities from antimicrobials  Extremely medically complex  Patient is high risk for infectious complications given medical comorbidities    Not clear that infection is playing role in chronic foot ulcer. Likely poor wound healing from combination from  DM,  tobacco abuse, PAD and psychosocial factors. I have seen no clinical improvement despite prolonged courses of antibiotics (IV vancomycin at LTAC, inpatient iv abx, and then cipro/doxy). Note prior bone biopsies (both path and cultures) have been negative for OM    Plan:     Continue wound care    Pressure offloading    Stop antibiotics    If amputation done, please send proximal margin for path/culture and refer back to ID if any signs of residual infection        I have sent communication to the referring physician      The total time for evaluation and management services performed on 3/25/23 was greater than 35  minutes. This includes face to face time and non-face to face time preparing to see the patient (eg, review of tests), obtaining and/or reviewing separately obtained history, documenting clinical information in the electronic or other health record, independently interpreting results, and communicating results to the patient/family/caregiver, or care coordination.             Beverly Zavala MD, MPH  Infectious Disease

## 2023-03-23 ENCOUNTER — OFFICE VISIT (OUTPATIENT)
Dept: SURGERY | Facility: CLINIC | Age: 53
End: 2023-03-23
Payer: MEDICAID

## 2023-03-23 ENCOUNTER — ANESTHESIA EVENT (OUTPATIENT)
Dept: SURGERY | Facility: HOSPITAL | Age: 53
DRG: 617 | End: 2023-03-23
Payer: MEDICAID

## 2023-03-23 VITALS
SYSTOLIC BLOOD PRESSURE: 92 MMHG | HEIGHT: 66 IN | OXYGEN SATURATION: 98 % | HEART RATE: 110 BPM | BODY MASS INDEX: 37.24 KG/M2 | DIASTOLIC BLOOD PRESSURE: 55 MMHG

## 2023-03-23 DIAGNOSIS — L97.423 LEFT MIDFOOT ULCER, WITH NECROSIS OF MUSCLE: ICD-10-CM

## 2023-03-23 PROCEDURE — 99215 OFFICE O/P EST HI 40 MIN: CPT | Mod: PBBFAC | Performed by: SURGERY

## 2023-03-23 PROCEDURE — 3051F HG A1C>EQUAL 7.0%<8.0%: CPT | Mod: CPTII,,, | Performed by: SURGERY

## 2023-03-23 PROCEDURE — 3008F PR BODY MASS INDEX (BMI) DOCUMENTED: ICD-10-PCS | Mod: CPTII,,, | Performed by: SURGERY

## 2023-03-23 PROCEDURE — 99214 OFFICE O/P EST MOD 30 MIN: CPT | Mod: S$PBB,,, | Performed by: SURGERY

## 2023-03-23 PROCEDURE — 3078F DIAST BP <80 MM HG: CPT | Mod: CPTII,,, | Performed by: SURGERY

## 2023-03-23 PROCEDURE — 3074F PR MOST RECENT SYSTOLIC BLOOD PRESSURE < 130 MM HG: ICD-10-PCS | Mod: CPTII,,, | Performed by: SURGERY

## 2023-03-23 PROCEDURE — 99214 PR OFFICE/OUTPT VISIT, EST, LEVL IV, 30-39 MIN: ICD-10-PCS | Mod: S$PBB,,, | Performed by: SURGERY

## 2023-03-23 PROCEDURE — 1159F PR MEDICATION LIST DOCUMENTED IN MEDICAL RECORD: ICD-10-PCS | Mod: CPTII,,, | Performed by: SURGERY

## 2023-03-23 PROCEDURE — 1160F PR REVIEW ALL MEDS BY PRESCRIBER/CLIN PHARMACIST DOCUMENTED: ICD-10-PCS | Mod: CPTII,,, | Performed by: SURGERY

## 2023-03-23 PROCEDURE — 3074F SYST BP LT 130 MM HG: CPT | Mod: CPTII,,, | Performed by: SURGERY

## 2023-03-23 PROCEDURE — 4010F PR ACE/ARB THEARPY RXD/TAKEN: ICD-10-PCS | Mod: CPTII,,, | Performed by: SURGERY

## 2023-03-23 PROCEDURE — 1160F RVW MEDS BY RX/DR IN RCRD: CPT | Mod: CPTII,,, | Performed by: SURGERY

## 2023-03-23 PROCEDURE — 99999 PR PBB SHADOW E&M-EST. PATIENT-LVL V: ICD-10-PCS | Mod: PBBFAC,,, | Performed by: SURGERY

## 2023-03-23 PROCEDURE — 3051F PR MOST RECENT HEMOGLOBIN A1C LEVEL 7.0 - < 8.0%: ICD-10-PCS | Mod: CPTII,,, | Performed by: SURGERY

## 2023-03-23 PROCEDURE — 3078F PR MOST RECENT DIASTOLIC BLOOD PRESSURE < 80 MM HG: ICD-10-PCS | Mod: CPTII,,, | Performed by: SURGERY

## 2023-03-23 PROCEDURE — 1111F PR DISCHARGE MEDS RECONCILED W/ CURRENT OUTPATIENT MED LIST: ICD-10-PCS | Mod: CPTII,,, | Performed by: SURGERY

## 2023-03-23 PROCEDURE — 3008F BODY MASS INDEX DOCD: CPT | Mod: CPTII,,, | Performed by: SURGERY

## 2023-03-23 PROCEDURE — 1111F DSCHRG MED/CURRENT MED MERGE: CPT | Mod: CPTII,,, | Performed by: SURGERY

## 2023-03-23 PROCEDURE — 4010F ACE/ARB THERAPY RXD/TAKEN: CPT | Mod: CPTII,,, | Performed by: SURGERY

## 2023-03-23 PROCEDURE — 99999 PR PBB SHADOW E&M-EST. PATIENT-LVL V: CPT | Mod: PBBFAC,,, | Performed by: SURGERY

## 2023-03-23 PROCEDURE — 1159F MED LIST DOCD IN RCRD: CPT | Mod: CPTII,,, | Performed by: SURGERY

## 2023-03-23 RX ORDER — SODIUM CHLORIDE 9 MG/ML
INJECTION, SOLUTION INTRAVENOUS CONTINUOUS
Status: CANCELLED | OUTPATIENT
Start: 2023-03-23

## 2023-03-23 RX ORDER — LIDOCAINE HYDROCHLORIDE 10 MG/ML
1 INJECTION, SOLUTION EPIDURAL; INFILTRATION; INTRACAUDAL; PERINEURAL ONCE
Status: CANCELLED | OUTPATIENT
Start: 2023-03-23 | End: 2023-03-23

## 2023-03-23 NOTE — H&P (VIEW-ONLY)
History and Physical Exam    Patient ID: Audrey Natarajan is a 52 y.o. female.    Chief Complaint: ulcer of left midfoot       HPI:  53 y/o woman with history of left foot osteomyelitis and despite multiple medical and surgical treatments has been unable to clear her infection (for over 4 years).      Review of Systems   Constitutional:  Negative for chills and unexpected weight change.   HENT:  Negative for congestion, ear pain and sore throat.    Eyes:  Negative for pain and redness.   Respiratory:  Negative for cough and shortness of breath.    Cardiovascular:  Negative for chest pain and palpitations.   Gastrointestinal:  Negative for abdominal distention, abdominal pain and constipation.   Endocrine: Negative for cold intolerance and heat intolerance.   Genitourinary:  Negative for dysuria and frequency.   Musculoskeletal:  Negative for back pain and neck pain.   Skin:  Negative for pallor and rash.   Neurological:  Negative for syncope, light-headedness and headaches.   Hematological:  Negative for adenopathy. Does not bruise/bleed easily.   Psychiatric/Behavioral:  Negative for confusion and hallucinations.      Current Outpatient Medications   Medication Sig Dispense Refill    acetaminophen (TYLENOL) 500 MG tablet Take 2 tablets (1,000 mg total) by mouth every 6 (six) hours as needed for Pain. 30 tablet 0    albuterol (PROVENTIL/VENTOLIN HFA) 90 mcg/actuation inhaler INHALE 2 PUFFS INTO THE LUNGS EVERY 6 HOURS AS NEEDED FOR WHEEZING. RESCUE 6.7 g 2    albuterol-ipratropium (DUO-NEB) 2.5 mg-0.5 mg/3 mL nebulizer solution Take 3 mLs by nebulization every 6 (six) hours as needed for Wheezing or Shortness of Breath. Rescue 1 each 0    ammonium lactate (LAC-HYDRIN) 12 % lotion APPL Y ONCE TOPICALLY TWICE DAILY FOR 30 DAYS 225 g 0    apixaban (ELIQUIS) 5 mg Tab Take 1 tablet (5 mg total) by mouth 2 (two) times daily. 60 tablet 0    ASPERCREME, LIDOCAINE, 4 % PtMd Apply 1 patch topically.      aspirin 81 MG  Chew Take 1 tablet (81 mg total) by mouth once daily. 30 tablet 0    BIOFREEZE, MENTHOL, TOP Apply topically.      bumetanide (BUMEX) 1 MG tablet Take 1 tablet (1 mg total) by mouth once daily. 30 tablet 5    ciprofloxacin HCl (CIPRO) 750 MG tablet Take 1 tablet (750 mg total) by mouth every 12 (twelve) hours. for 21 days 42 tablet 0    cyanocobalamin (VITAMIN B-12) 1000 MCG tablet Take 100 mcg by mouth once daily.      divalproex (DEPAKOTE) 500 MG TbEC Take 1 tablet (500 mg total) by mouth once daily. PO QAM 30 tablet 11    doxycycline (VIBRA-TABS) 100 MG tablet Take 1 tablet (100 mg total) by mouth 2 (two) times daily. for 21 days 42 tablet 0    DUPIXENT  mg/2 mL PnIj Inject into the skin.      ferrous sulfate 325 (65 FE) MG EC tablet Take 1 tablet (325 mg total) by mouth once daily. 30 tablet 0    fluticasone propionate (FLONASE) 50 mcg/actuation nasal spray 2 sprays (100 mcg total) by Each Nostril route daily as needed (Nasal congestion). 9.9 mL 0    fluticasone-salmeterol diskus inhaler 250-50 mcg Inhale 1 puff into the lungs 2 (two) times daily. Controller 60 each 0    hydrOXYzine (ATARAX) 50 MG tablet Take 0.5 tablets (25 mg total) by mouth 4 (four) times daily as needed for Itching or Anxiety.      LIDOcaine (LIDODERM) 5 % Place 1 patch onto the skin once daily. Remove & Discard patch within 12 hours or as directed by MD  0    lisinopriL 10 MG tablet Take 1 tablet (10 mg total) by mouth once daily. 30 tablet 0    loratadine (CLARITIN) 10 mg tablet Take 1 tablet (10 mg total) by mouth once daily. 90 tablet 3    metFORMIN (GLUCOPHAGE) 1000 MG tablet Take 1 tablet (1,000 mg total) by mouth 2 (two) times daily with meals. 180 tablet 1    methocarbamoL (ROBAXIN) 500 MG Tab Take 500 mg by mouth. Frequency could not be confirmed.      metoprolol tartrate (LOPRESSOR) 25 MG tablet Take 1 tablet (25 mg total) by mouth 2 (two) times daily. 60 tablet 0    multivitamin Tab Take 1 tablet by mouth once daily. 30  tablet 2    nicotine (NICODERM CQ) 14 mg/24 hr Place 1 patch onto the skin once daily. 28 patch 0    nystatin (NYSTOP) powder APPLY TO ABDOMINAL AND BREAST SKIN FOLD TWICE DAILY. 60 g 0    ondansetron (ZOFRAN) 8 MG tablet Take 1 tablet (8 mg total) by mouth every 8 (eight) hours as needed for Nausea. 20 tablet 0    pantoprazole (PROTONIX) 40 MG tablet Take 1 tablet (40 mg total) by mouth once daily. 30 tablet 0    polyethylene glycol (GLYCOLAX) 17 gram/dose powder Mix 1 capful (17 g) with fluids and drink by mouth once daily. 510 g 1    pravastatin (PRAVACHOL) 40 MG tablet Take 1 tablet (40 mg total) by mouth every evening. 30 tablet 0    risperiDONE (RISPERDAL M-TABS) 3 MG disintegrating tablet Take 1 tablet (3 mg total) by mouth 2 (two) times daily. 60 tablet 0    semaglutide (OZEMPIC SUBQ) Inject into the skin.      senna-docusate 8.6-50 mg (PERICOLACE) 8.6-50 mg per tablet Take 1 tablet by mouth once daily. 30 tablet 1    traMADoL (ULTRAM) 50 mg tablet Take 1 tablet (50 mg total) by mouth every 8 (eight) hours as needed for Pain (foot pain). 30 tablet 0    vitamin E 1000 UNIT capsule Take 1,000 Units by mouth once daily.      VYVANSE 40 mg Cap Take 40 mg by mouth once daily.       No current facility-administered medications for this visit.       Review of patient's allergies indicates:   Allergen Reactions    Morphine Other (See Comments)     Patient had a psychotic episode after taking Morphine  Agitation, hallucinations    Penicillins Anaphylaxis     Tolerated cephalosporins in the past    Januvia [sitagliptin] Hives    Carbamazepine Other (See Comments)     hyponatremia       Past Medical History:   Diagnosis Date    ADHD (attention deficit hyperactivity disorder)     Arthritis     Asthma     Bipolar 1 disorder     Cataract     Cigarette smoker     COPD (chronic obstructive pulmonary disease)     Coronary artery disease     A fib    Depression     bipolar manic depresson    Diabetes mellitus     Diabetic  "foot ulcers     Diabetic neuropathy     DVT of lower extremity, bilateral 07/2013    bilateral LE DVT. Estelita filter placed.     Encounter for blood transfusion     History of blood clots 1. Left Leg=2003; 2.Bilateral Groin=Blood Clots= 5 or 6/ 2013 & 7/2013; 3. LLL of Lung=7/2013;  4. Lt. Lower Leg=7/2013.     Pt. had 1st Blood Clot after Jcxhdppncyph=4383, & Last=2013. [place] Filter= Rt.Lateral Neck.    HTN (hypertension) 06/06/2013    Pt states that she does not have hypertension    Hypercholesteremia     Irregular heartbeat     Neuromuscular disorder     neuropathy feet    Obese     PE (pulmonary embolism) 07/2013    bilat LE DVT.     Restless leg syndrome        Past Surgical History:   Procedure Laterality Date    ABDOMINAL SURGERY  2010    gastric sleeve    BILATERAL OOPHORECTOMY Bilateral 1/12/2015    CHOLECYSTECTOMY      DEBRIDEMENT OF FOOT Bilateral 5/10/2022    Procedure: DEBRIDEMENT, FOOT;  Surgeon: Maira De Los Santos DPM;  Location: [facility] OR;  Service: Podiatry;  Laterality: Bilateral;    DEBRIDEMENT OF FOOT Left 2/28/2023    Procedure: DEBRIDEMENT, FOOT,biopsy;  Surgeon: Maira De Los Santos DPM;  Location: [facility] OR;  Service: Podiatry;  Laterality: Left;  request misonix, wound VAC, possible neoxx    Green' s filter Right 7/4/2012    Right Neck & Tunneled Down.    HERNIA REPAIR      "[place] [facility] Hern[facility] around th [place].", pt. states    INCISION AND DRAINAGE FOOT Left 12/24/2022    Procedure: INCISION AND DRAINAGE, FOOT;  Surgeon: Fahad Razo DPM;  Location: [facility] OR;  Service: Podiatry;  Laterality: Left;    LAPAROSCOPIC CHOLECYSTECTOMY N/A 9/10/2020    Procedure: CHOLECYSTECTOMY, LAPAROSCOPIC;  Surgeon: Montrell Gutierrez MD;  Location: [facility] OR;  Service: General;  Laterality: N/A;  RN PREOP 9/9----COVID Negative  9/9    OVARIAN CYST REMOVAL  3/13/2014    [place] REMOVAL OF OVARY/TUBE(S)      SPLENECTOMY, TOTAL  July 2003    TONSILLECTOMY      as a child    TYMPANOSTOMY TUBE PLACEMENT  1976    " VEIN SURGERY      Lt leg       Family History   Problem Relation Age of Onset    Hypertension Father     Diabetes Father     Heart disease Father     Cataracts Father     Diabetes Paternal Grandfather     Heart disease Paternal Grandfather     No Known Problems Mother     Ovarian cancer Maternal Grandmother          from this. ? age     No Known Problems Sister     No Known Problems Brother     No Known Problems Maternal Aunt     No Known Problems Maternal Uncle     No Known Problems Paternal Aunt     No Known Problems Paternal Uncle     No Known Problems Maternal Grandfather     Ovarian cancer Paternal Grandmother     Uterine cancer Neg Hx     Breast cancer Neg Hx     Colon cancer Neg Hx     Amblyopia Neg Hx     Blindness Neg Hx     Cancer Neg Hx     Glaucoma Neg Hx     Macular degeneration Neg Hx     Retinal detachment Neg Hx     Strabismus Neg Hx     Stroke Neg Hx     Thyroid disease Neg Hx        Social History     Socioeconomic History    Marital status: Significant Other   Tobacco Use    Smoking status: Every Day     Packs/day: 0.50     Years: 37.00     Pack years: 18.50     Types: Cigarettes     Last attempt to quit: 2020     Years since quittin.2    Smokeless tobacco: Current    Tobacco comments:     Enrolled in the [employer] on 5/3/14 ([facility] Member ID # [id number]). Ambulatory referral to Smoking Cessation Program   Substance and Sexual Activity    Alcohol use: No     Alcohol/week: 0.0 standard drinks    Drug use: No    Sexual activity: Yes     Partners: Male   Social History Narrative     from her  of 20 years    Lives by herself since 2019     Social Determinants of Health     Financial Resource Strain: Medium Risk    Difficulty of Paying Living Expenses: Somewhat hard   Food Insecurity: Food Insecurity Present    Worried About Running Out of Food in the Last Year: Often true    Ran Out of Food in the Last Year: Often true   Transportation Needs: Unmet Transportation  Needs    Lack of Transportation (Medical): Yes    Lack of Transportation (Non-Medical): Yes   Physical Activity: Inactive    Days of Exercise per Week: 0 days    Minutes of Exercise per Session: 0 min   Stress: Stress Concern Present    Feeling of Stress : To some extent   Social Connections: Moderately Isolated    Frequency of Communication with Friends and Family: More than three times a week    Frequency of Social Gatherings with Friends and Family: More than three times a week    Attends Restorationism Services: 1 to 4 times per year    Active Member of Clubs or Organizations: No    Attends Club or Organization Meetings: Never    Marital Status: Never    Housing Stability: Low Risk     Unable to Pay for Housing in the Last Year: No    Number of Places Lived in the Last Year: 1    Unstable Housing in the Last Year: No       Vitals:    03/23/23 1136   BP: (!) 92/55   Pulse: 110       Physical Exam  Constitutional:       Appearance: She is well-developed.   HENT:      Head: Normocephalic and atraumatic.   Eyes:      Pupils: Pupils are equal, round, and reactive to light.   Cardiovascular:      Rate and Rhythm: Normal rate and regular rhythm.      Heart sounds: No murmur heard.  Pulmonary:      Effort: Pulmonary effort is normal.      Breath sounds: Normal breath sounds. No wheezing.   Abdominal:      General: There is no distension.      Palpations: Abdomen is soft.      Tenderness: There is no abdominal tenderness.   Musculoskeletal:         General: Normal range of motion.      Cervical back: Normal range of motion and neck supple.      Left lower leg: Edema (left foot wound) present.   Skin:     General: Skin is warm and dry.      Capillary Refill: Capillary refill takes less than 2 seconds.      Findings: No rash.   Neurological:      Mental Status: She is alert and oriented to person, place, and time.   Psychiatric:         Judgment: Judgment normal.       Assessment & Plan:   Left foot osteomyelotits,  nonresponsive to treatment, plan left BKA     Risks, benefits, alternatives to the procedure were discussed with the patient, who appears to understand and agree with the treatment plan.

## 2023-03-23 NOTE — PROGRESS NOTES
History and Physical Exam    Patient ID: Audrey Natarajan is a 52 y.o. female.    Chief Complaint: ulcer of left midfoot       HPI:  51 y/o woman with history of left foot osteomyelitis and despite multiple medical and surgical treatments has been unable to clear her infection (for over 4 years).      Review of Systems   Constitutional:  Negative for chills and unexpected weight change.   HENT:  Negative for congestion, ear pain and sore throat.    Eyes:  Negative for pain and redness.   Respiratory:  Negative for cough and shortness of breath.    Cardiovascular:  Negative for chest pain and palpitations.   Gastrointestinal:  Negative for abdominal distention, abdominal pain and constipation.   Endocrine: Negative for cold intolerance and heat intolerance.   Genitourinary:  Negative for dysuria and frequency.   Musculoskeletal:  Negative for back pain and neck pain.   Skin:  Negative for pallor and rash.   Neurological:  Negative for syncope, light-headedness and headaches.   Hematological:  Negative for adenopathy. Does not bruise/bleed easily.   Psychiatric/Behavioral:  Negative for confusion and hallucinations.      Current Outpatient Medications   Medication Sig Dispense Refill    acetaminophen (TYLENOL) 500 MG tablet Take 2 tablets (1,000 mg total) by mouth every 6 (six) hours as needed for Pain. 30 tablet 0    albuterol (PROVENTIL/VENTOLIN HFA) 90 mcg/actuation inhaler INHALE 2 PUFFS INTO THE LUNGS EVERY 6 HOURS AS NEEDED FOR WHEEZING. RESCUE 6.7 g 2    albuterol-ipratropium (DUO-NEB) 2.5 mg-0.5 mg/3 mL nebulizer solution Take 3 mLs by nebulization every 6 (six) hours as needed for Wheezing or Shortness of Breath. Rescue 1 each 0    ammonium lactate (LAC-HYDRIN) 12 % lotion APPL Y ONCE TOPICALLY TWICE DAILY FOR 30 DAYS 225 g 0    apixaban (ELIQUIS) 5 mg Tab Take 1 tablet (5 mg total) by mouth 2 (two) times daily. 60 tablet 0    ASPERCREME, LIDOCAINE, 4 % PtMd Apply 1 patch topically.      aspirin 81 MG  Chew Take 1 tablet (81 mg total) by mouth once daily. 30 tablet 0    BIOFREEZE, MENTHOL, TOP Apply topically.      bumetanide (BUMEX) 1 MG tablet Take 1 tablet (1 mg total) by mouth once daily. 30 tablet 5    ciprofloxacin HCl (CIPRO) 750 MG tablet Take 1 tablet (750 mg total) by mouth every 12 (twelve) hours. for 21 days 42 tablet 0    cyanocobalamin (VITAMIN B-12) 1000 MCG tablet Take 100 mcg by mouth once daily.      divalproex (DEPAKOTE) 500 MG TbEC Take 1 tablet (500 mg total) by mouth once daily. PO QAM 30 tablet 11    doxycycline (VIBRA-TABS) 100 MG tablet Take 1 tablet (100 mg total) by mouth 2 (two) times daily. for 21 days 42 tablet 0    DUPIXENT  mg/2 mL PnIj Inject into the skin.      ferrous sulfate 325 (65 FE) MG EC tablet Take 1 tablet (325 mg total) by mouth once daily. 30 tablet 0    fluticasone propionate (FLONASE) 50 mcg/actuation nasal spray 2 sprays (100 mcg total) by Each Nostril route daily as needed (Nasal congestion). 9.9 mL 0    fluticasone-salmeterol diskus inhaler 250-50 mcg Inhale 1 puff into the lungs 2 (two) times daily. Controller 60 each 0    hydrOXYzine (ATARAX) 50 MG tablet Take 0.5 tablets (25 mg total) by mouth 4 (four) times daily as needed for Itching or Anxiety.      LIDOcaine (LIDODERM) 5 % Place 1 patch onto the skin once daily. Remove & Discard patch within 12 hours or as directed by MD  0    lisinopriL 10 MG tablet Take 1 tablet (10 mg total) by mouth once daily. 30 tablet 0    loratadine (CLARITIN) 10 mg tablet Take 1 tablet (10 mg total) by mouth once daily. 90 tablet 3    metFORMIN (GLUCOPHAGE) 1000 MG tablet Take 1 tablet (1,000 mg total) by mouth 2 (two) times daily with meals. 180 tablet 1    methocarbamoL (ROBAXIN) 500 MG Tab Take 500 mg by mouth. Frequency could not be confirmed.      metoprolol tartrate (LOPRESSOR) 25 MG tablet Take 1 tablet (25 mg total) by mouth 2 (two) times daily. 60 tablet 0    multivitamin Tab Take 1 tablet by mouth once daily. 30  tablet 2    nicotine (NICODERM CQ) 14 mg/24 hr Place 1 patch onto the skin once daily. 28 patch 0    nystatin (NYSTOP) powder APPLY TO ABDOMINAL AND BREAST SKIN FOLD TWICE DAILY. 60 g 0    ondansetron (ZOFRAN) 8 MG tablet Take 1 tablet (8 mg total) by mouth every 8 (eight) hours as needed for Nausea. 20 tablet 0    pantoprazole (PROTONIX) 40 MG tablet Take 1 tablet (40 mg total) by mouth once daily. 30 tablet 0    polyethylene glycol (GLYCOLAX) 17 gram/dose powder Mix 1 capful (17 g) with fluids and drink by mouth once daily. 510 g 1    pravastatin (PRAVACHOL) 40 MG tablet Take 1 tablet (40 mg total) by mouth every evening. 30 tablet 0    risperiDONE (RISPERDAL M-TABS) 3 MG disintegrating tablet Take 1 tablet (3 mg total) by mouth 2 (two) times daily. 60 tablet 0    semaglutide (OZEMPIC SUBQ) Inject into the skin.      senna-docusate 8.6-50 mg (PERICOLACE) 8.6-50 mg per tablet Take 1 tablet by mouth once daily. 30 tablet 1    traMADoL (ULTRAM) 50 mg tablet Take 1 tablet (50 mg total) by mouth every 8 (eight) hours as needed for Pain (foot pain). 30 tablet 0    vitamin E 1000 UNIT capsule Take 1,000 Units by mouth once daily.      VYVANSE 40 mg Cap Take 40 mg by mouth once daily.       No current facility-administered medications for this visit.       Review of patient's allergies indicates:   Allergen Reactions    Morphine Other (See Comments)     Patient had a psychotic episode after taking Morphine  Agitation, hallucinations    Penicillins Anaphylaxis     Tolerated cephalosporins in the past    Januvia [sitagliptin] Hives    Carbamazepine Other (See Comments)     hyponatremia       Past Medical History:   Diagnosis Date    ADHD (attention deficit hyperactivity disorder)     Arthritis     Asthma     Bipolar 1 disorder     Cataract     Cigarette smoker     COPD (chronic obstructive pulmonary disease)     Coronary artery disease     A fib    Depression     bipolar manic depresson    Diabetes mellitus     Diabetic  "foot ulcers     Diabetic neuropathy     DVT of lower extremity, bilateral 07/2013    bilateral LE DVT. Estelita filter placed.     Encounter for blood transfusion     History of blood clots 1. Left Leg=2003; 2.Bilateral Groin=Blood Clots= 5 or 6/ 2013 & 7/2013; 3. LLL of Lung=7/2013;  4. Lt. Lower Leg=7/2013.     Pt. had 1st Blood Clot after Iufhebxgxzvh=3522, & Last=2013. [place] Filter= Rt.Lateral Neck.    HTN (hypertension) 06/06/2013    Pt states that she does not have hypertension    Hypercholesteremia     Irregular heartbeat     Neuromuscular disorder     neuropathy feet    Obese     PE (pulmonary embolism) 07/2013    bilat LE DVT.     Restless leg syndrome        Past Surgical History:   Procedure Laterality Date    ABDOMINAL SURGERY  2010    gastric sleeve    BILATERAL OOPHORECTOMY Bilateral 1/12/2015    CHOLECYSTECTOMY      DEBRIDEMENT OF FOOT Bilateral 5/10/2022    Procedure: DEBRIDEMENT, FOOT;  Surgeon: Maira De Los Santos DPM;  Location: [facility] OR;  Service: Podiatry;  Laterality: Bilateral;    DEBRIDEMENT OF FOOT Left 2/28/2023    Procedure: DEBRIDEMENT, FOOT,biopsy;  Surgeon: Maira De Los Santos DPM;  Location: [facility] OR;  Service: Podiatry;  Laterality: Left;  request misonix, wound VAC, possible neoxx    Green' s filter Right 7/4/2012    Right Neck & Tunneled Down.    HERNIA REPAIR      "[place] of Hernias [facility] around th [place].", pt. states    INCISION AND DRAINAGE FOOT Left 12/24/2022    Procedure: INCISION AND DRAINAGE, FOOT;  Surgeon: Fahad Razo DPM;  Location: [facility] OR;  Service: Podiatry;  Laterality: Left;    LAPAROSCOPIC CHOLECYSTECTOMY N/A 9/10/2020    Procedure: CHOLECYSTECTOMY, LAPAROSCOPIC;  Surgeon: Montrell Gutierrez MD;  Location: [facility] OR;  Service: General;  Laterality: N/A;  RN PREOP 9/9----COVID Negative  9/9    OVARIAN CYST REMOVAL  3/13/2014    [place] REMOVAL OF OVARY/TUBE(S)      SPLENECTOMY, TOTAL  July 2003    TONSILLECTOMY      as a child    TYMPANOSTOMY TUBE PLACEMENT  1976    " VEIN SURGERY      Lt leg       Family History   Problem Relation Age of Onset    Hypertension Father     Diabetes Father     Heart disease Father     Cataracts Father     Diabetes Paternal Grandfather     Heart disease Paternal Grandfather     No Known Problems Mother     Ovarian cancer Maternal Grandmother          from this. ? age     No Known Problems Sister     No Known Problems Brother     No Known Problems Maternal Aunt     No Known Problems Maternal Uncle     No Known Problems Paternal Aunt     No Known Problems Paternal Uncle     No Known Problems Maternal Grandfather     Ovarian cancer Paternal Grandmother     Uterine cancer Neg Hx     Breast cancer Neg Hx     Colon cancer Neg Hx     Amblyopia Neg Hx     Blindness Neg Hx     Cancer Neg Hx     Glaucoma Neg Hx     Macular degeneration Neg Hx     Retinal detachment Neg Hx     Strabismus Neg Hx     Stroke Neg Hx     Thyroid disease Neg Hx        Social History     Socioeconomic History    Marital status: Significant Other   Tobacco Use    Smoking status: Every Day     Packs/day: 0.50     Years: 37.00     Pack years: 18.50     Types: Cigarettes     Last attempt to quit: 2020     Years since quittin.2    Smokeless tobacco: Current    Tobacco comments:     Enrolled in the [employer] on 5/3/14 ([facility] Member ID # [id number]). Ambulatory referral to Smoking Cessation Program   Substance and Sexual Activity    Alcohol use: No     Alcohol/week: 0.0 standard drinks    Drug use: No    Sexual activity: Yes     Partners: Male   Social History Narrative     from her  of 20 years    Lives by herself since 2019     Social Determinants of Health     Financial Resource Strain: Medium Risk    Difficulty of Paying Living Expenses: Somewhat hard   Food Insecurity: Food Insecurity Present    Worried About Running Out of Food in the Last Year: Often true    Ran Out of Food in the Last Year: Often true   Transportation Needs: Unmet Transportation  Needs    Lack of Transportation (Medical): Yes    Lack of Transportation (Non-Medical): Yes   Physical Activity: Inactive    Days of Exercise per Week: 0 days    Minutes of Exercise per Session: 0 min   Stress: Stress Concern Present    Feeling of Stress : To some extent   Social Connections: Moderately Isolated    Frequency of Communication with Friends and Family: More than three times a week    Frequency of Social Gatherings with Friends and Family: More than three times a week    Attends Taoism Services: 1 to 4 times per year    Active Member of Clubs or Organizations: No    Attends Club or Organization Meetings: Never    Marital Status: Never    Housing Stability: Low Risk     Unable to Pay for Housing in the Last Year: No    Number of Places Lived in the Last Year: 1    Unstable Housing in the Last Year: No       Vitals:    03/23/23 1136   BP: (!) 92/55   Pulse: 110       Physical Exam  Constitutional:       Appearance: She is well-developed.   HENT:      Head: Normocephalic and atraumatic.   Eyes:      Pupils: Pupils are equal, round, and reactive to light.   Cardiovascular:      Rate and Rhythm: Normal rate and regular rhythm.      Heart sounds: No murmur heard.  Pulmonary:      Effort: Pulmonary effort is normal.      Breath sounds: Normal breath sounds. No wheezing.   Abdominal:      General: There is no distension.      Palpations: Abdomen is soft.      Tenderness: There is no abdominal tenderness.   Musculoskeletal:         General: Normal range of motion.      Cervical back: Normal range of motion and neck supple.      Left lower leg: Edema (left foot wound) present.   Skin:     General: Skin is warm and dry.      Capillary Refill: Capillary refill takes less than 2 seconds.      Findings: No rash.   Neurological:      Mental Status: She is alert and oriented to person, place, and time.   Psychiatric:         Judgment: Judgment normal.       Assessment & Plan:   Left foot osteomyelotits,  nonresponsive to treatment, plan left BKA     Risks, benefits, alternatives to the procedure were discussed with the patient, who appears to understand and agree with the treatment plan.

## 2023-03-27 DIAGNOSIS — E11.42 TYPE 2 DIABETES MELLITUS WITH DIABETIC POLYNEUROPATHY, WITHOUT LONG-TERM CURRENT USE OF INSULIN: ICD-10-CM

## 2023-03-27 DIAGNOSIS — I82.5Y9 CHRONIC DEEP VEIN THROMBOSIS (DVT) OF PROXIMAL VEIN OF LOWER EXTREMITY, UNSPECIFIED LATERALITY: ICD-10-CM

## 2023-03-27 RX ORDER — PRAVASTATIN SODIUM 40 MG/1
40 TABLET ORAL NIGHTLY
Qty: 30 TABLET | Refills: 5 | Status: SHIPPED | OUTPATIENT
Start: 2023-03-27 | End: 2023-10-19 | Stop reason: SDUPTHER

## 2023-03-27 NOTE — TELEPHONE ENCOUNTER
Refill Routing Note   Medication(s) are not appropriate for processing by [facility] for the following reason(s):      Medication outside of protocol  Required labs outdated    [facility] action(s):  Defer  Route       Medication Therapy Plan: (Eliquis-non delegated);(Defer-Pravastatin)      Appointments  past 12m or future 3m with PCP    Date Provider   Last Visit   3/15/2023 Donaldo Pena MD   Next Visit   6/15/2023 Donaldo Pena MD   ED visits in past 90 days: 0        Note composed:2:01 PM 03/27/2023

## 2023-03-27 NOTE — TELEPHONE ENCOUNTER
----- Message from Dameon Rodolfo sent at 3/27/2023  9:31 AM CDT -----  Regarding: Self  [phone]  Type: RX Refill Request    Who Called:     Have you contacted your pharmacy:    Refill    RX Name and Strength:  apixaban (ELIQUIS) 5 mg Tab  pravastatin (PRAVACHOL) 40 MG tablet      Preferred Pharmacy with phone number:   [id number] - [place] AT [address]  [place] [address]  Phone: [phone] Fax: [phone]      Local or Mail Order: local     Would the patient rather a call back or a response via [facility]? Call back     Best Call Back Number: [phone]     Additional Information:     Thank you.

## 2023-03-27 NOTE — TELEPHONE ENCOUNTER
No new care gaps identified.  [facility] Embedded Care Gaps. Reference number: [id number]. 3/27/2023   10:17:50 AM CDT

## 2023-03-28 LAB — FUNGUS SPEC CULT: NORMAL

## 2023-03-31 ENCOUNTER — HOSPITAL ENCOUNTER (OUTPATIENT)
Dept: WOUND CARE | Facility: HOSPITAL | Age: 53
Discharge: HOME | End: 2023-03-31
Attending: PODIATRIST
Payer: MEDICAID

## 2023-03-31 VITALS — SYSTOLIC BLOOD PRESSURE: 128 MMHG | DIASTOLIC BLOOD PRESSURE: 62 MMHG | TEMPERATURE: 98 F | HEART RATE: 105 BPM

## 2023-03-31 DIAGNOSIS — S91.302A OPEN WOUND OF LEFT FOOT: Primary | ICD-10-CM

## 2023-03-31 PROCEDURE — 99213 OFFICE O/P EST LOW 20 MIN: CPT | Performed by: PODIATRIST

## 2023-03-31 PROCEDURE — 99213 PR OFFICE/OUTPT VISIT, EST, LEVL III, 20-29 MIN: ICD-10-PCS | Mod: ,,, | Performed by: PODIATRIST

## 2023-03-31 PROCEDURE — 99213 OFFICE O/P EST LOW 20 MIN: CPT | Mod: ,,, | Performed by: PODIATRIST

## 2023-03-31 RX ORDER — TRAMADOL HYDROCHLORIDE 50 MG/1
50 TABLET ORAL EVERY 6 HOURS PRN
Qty: 28 TABLET | Refills: 0 | Status: SHIPPED | OUTPATIENT
Start: 2023-03-31 | End: 2023-04-11 | Stop reason: CLARIF

## 2023-03-31 NOTE — PROGRESS NOTES
[facility]                Progress Note    Subjective:       Patient ID: Audrey Natarajan is a 52 y.o. female.    Chief Complaint: Wound Check    To clinic in w/c accompanied by friend. Able to step to chair by self. Wound unchanged. Saw Dr Munoz and is in agreement with plan for amputation of foot. States family talked her out of amputation when was in hospital but is fully commited now. Discussed plan for extended rehab and plan to live with family on ground floor appartment. Complains of pain in [place] foot    Wound Check    Review of Systems   Constitutional: Negative.    Respiratory: Negative.     Musculoskeletal:  Positive for arthralgias.   Skin:  Positive for wound.   Psychiatric/Behavioral: Negative.         Objective:        Physical Exam  Constitutional:       Appearance: She is well-developed.      Comments: Oriented to time, place, and person.   Cardiovascular:      Comments: DP and PT pulses are palpable bilaterally. 3 sec capillary refill time and toes and feet are warm to touch proximally .  There is  hair growth on the feet and toes b/l. There is no edema b/l. No spider veins or varicosities present b/l.     Musculoskeletal:      Comments: Equinus noted b/l ankles with < 10 deg DF noted. MMT 5/5 in DF/PF/Inv/Ev resistance with no reproduction of pain in any direction. Passive range of motion of ankle and pedal joints is painless b/l.     Feet:      Right foot:      Skin integrity: No callus or dry skin.      Left foot:      Skin integrity: No callus or dry skin.   Lymphadenopathy:      Comments: Negative lymphadenopathy bilateral popliteal fossa and tarsal tunnel.   Skin:     Comments: See wound description      Neurological:      Mental Status: She is alert.      Comments: Light touch, proprioception, and sharp/dull sensation are all intact bilaterally. Protective threshold with the Romeo-Wienstein monofilament is intact bilaterally.     Psychiatric:         Behavior: Behavior is cooperative.       Vitals:    03/31/23 1337   BP: 128/62   Pulse: 105   Temp: 98.2 °F (36.8 °C)       Assessment:           ICD-10-CM ICD-9-CM   1. Open wound of left foot  S91.302A 892.0            Altered Skin Integrity 01/09/23 Left Heel Diabetic Ulcer (Active)   01/09/23    Altered Skin Integrity Present on Admission - Did Patient arrive to the hospital with altered skin?: yes   Side: Left   Orientation:    Location: Heel   Wound Number:    Is this injury device related?:    Primary Wound Type: Diabetic ulc   Description of Altered Skin Integrity:    Ankle-Brachial Index:    Pulses:    Removal Indication and Assessment:    Wound Outcome:    (Retired) Wound Length (cm):    (Retired) Wound Width (cm):    (Retired) Depth (cm):    Wound Description (Comments):    Removal Indications:    Wound Image   03/31/23 1409   Description of Altered Skin Integrity Full thickness tissue loss. Subcutaneous fat may be visible but bone, tendon or muscle are not exposed 03/31/23 1409   Drainage Amount Moderate 03/31/23 1409   Drainage Characteristics/Odor Serous 03/31/23 1409   Appearance Red 03/31/23 1409   Tissue loss description Full thickness 03/31/23 1409   Red (%), Wound Tissue Color 100 % 03/31/23 1409   Periwound Area Macerated 03/31/23 1409   Wound Edges Defined 03/31/23 1409   Wound Length (cm) 6 cm 03/31/23 1409   Wound Width (cm) 6 cm 03/31/23 1409   Wound Depth (cm) 1 cm 03/31/23 1409   Wound Volume (cm^3) 36 cm^3 03/31/23 1409   Wound Surface Area (cm^2) 36 cm^2 03/31/23 1409   Care Cleansed with:;Antimicrobial agent;Sterile normal saline 03/31/23 1409   Dressing Changed 03/31/23 1409   Off Loading Football dressing;Off loading shoe 03/31/23 1409   Dressing Change Due 04/14/23 03/31/23 1409           Plan:          Plan for ABE MANNING per Dr. Munoz on 4/19/23    Dressings: per above  Offloading:Foam        Short-term goals include maintaining good offloading and minimizing  bioburden, promoting granulation and epithelialization to healing.  Long-term goals include keeping the wound healed by good offloading and medical management under the direction of internist.    Tissue pathology and/or culture taken     [] Yes      [x] No  Sharp debridement performed                   [] Yes       [x] No  Labs ordered     [] Yes       [x] No  Imaging ordered    [] Yes      [] No    Orders Placed This Encounter   Procedures    Change dressing      M, W, F unill 4/14 when may return for appointment  Gentian Violet to periwound  Drawtex cut to size  Aquacel Extra, Mextra 4 x 8  Football/coban    SUBSEQUENT HOME HEALTH ORDERS      M, W, F unill 4/14 when may return for appointment  Gentian Violet to periwound  Drawtex cut to size  Aquacel Extra, Mextra 4 x 8  Football/coban  Football consists of placing small pieces of cast padding between toes to keep dry  Three cast padding wrapped around to cushion foot and secured with Coban     Order Specific Question:   What Home Health Agency is the patient currently using?     Answer:   Other/External     Comments:   [facility]        Follow up in about 2 weeks (around 4/14/2023).

## 2023-04-03 ENCOUNTER — PATIENT OUTREACH (OUTPATIENT)
Dept: ADMINISTRATIVE | Facility: OTHER | Age: 53
End: 2023-04-03
Payer: MEDICAID

## 2023-04-03 NOTE — PROGRESS NOTES
CHW - Case Closure    This Community Health Worker spoke to patient via telephone today.   Pt reported: Patient continue to have no needs at this time.  Pt denied any additional needs at this time and agrees with episode closure at this time.  Provided patient with Community Health Worker's contact information and encouraged her to contact this Community Health Worker if additional needs arise.

## 2023-04-08 DIAGNOSIS — I10 ESSENTIAL HYPERTENSION: ICD-10-CM

## 2023-04-08 DIAGNOSIS — E11.42 TYPE 2 DIABETES MELLITUS WITH DIABETIC POLYNEUROPATHY, WITHOUT LONG-TERM CURRENT USE OF INSULIN: ICD-10-CM

## 2023-04-08 NOTE — TELEPHONE ENCOUNTER
Care Due:                  Date            Visit Type   Department     Provider  --------------------------------------------------------------------------------                                             [facility]     MEDICINE/  Last Visit: 03-      FOLLOW UP    INTERNAL MED   Donaldo MARTINEZ -         [facility]      MEDICINE/  Next Visit: 06-      CARE ([facility])   INTERNAL MED   Donaldo Pena                                                            Last  Test          Frequency    Reason                     Performed    Due Date  --------------------------------------------------------------------------------    Lipid Panel.  12 months..  pravastatin..............  Not Found    Overdue    [employer] Embedded Care Gaps. Reference number: [id number]. 4/08/2023   9:36:25 AM CDT

## 2023-04-09 RX ORDER — LISINOPRIL 10 MG/1
TABLET ORAL
Qty: 90 TABLET | Refills: 3 | Status: SHIPPED | OUTPATIENT
Start: 2023-04-09 | End: 2023-07-10 | Stop reason: SDUPTHER

## 2023-04-09 NOTE — TELEPHONE ENCOUNTER
Refill Routing Note   Medication(s) are not appropriate for processing by [facility] for the following reason(s):      No active prescription written by PCP    [facility] action(s):  Defer Labs due          Appointments  past 12m or future 3m with PCP    Date Provider   Last Visit   3/15/2023 Donaldo Pena MD   Next Visit   6/15/2023 Donaldo Pena MD   ED visits in past 90 days: 0        Note composed:8:07 AM 04/09/2023

## 2023-04-11 ENCOUNTER — HOSPITAL ENCOUNTER (OUTPATIENT)
Dept: PREADMISSION TESTING | Facility: HOSPITAL | Age: 53
Discharge: HOME | End: 2023-04-11
Attending: SURGERY
Payer: MEDICAID

## 2023-04-11 VITALS
HEIGHT: 66 IN | OXYGEN SATURATION: 93 % | BODY MASS INDEX: 37.24 KG/M2 | RESPIRATION RATE: 18 BRPM | TEMPERATURE: 98 F | HEART RATE: 75 BPM

## 2023-04-11 DIAGNOSIS — L97.423 LEFT MIDFOOT ULCER, WITH NECROSIS OF MUSCLE: ICD-10-CM

## 2023-04-11 LAB
ALBUMIN SERPL BCP-MCNC: 3.1 G/DL (ref 3.5–5.2)
ALP SERPL-CCNC: 82 U/L (ref 55–135)
ALT SERPL W/O P-5'-P-CCNC: 10 U/L (ref 10–44)
ANION GAP SERPL CALC-SCNC: 16 MMOL/L (ref 8–16)
AST SERPL-CCNC: 8 U/L (ref 10–40)
BASOPHILS # BLD AUTO: 0.12 K/UL (ref 0–0.2)
BASOPHILS NFR BLD: 0.9 % (ref 0–1.9)
BILIRUB SERPL-MCNC: 0.2 MG/DL (ref 0.1–1)
BUN SERPL-MCNC: 21 MG/DL (ref 6–20)
CALCIUM SERPL-MCNC: 9.1 MG/DL (ref 8.7–10.5)
CHLORIDE SERPL-SCNC: 102 MMOL/L (ref 95–110)
CO2 SERPL-SCNC: 23 MMOL/L (ref 23–29)
CREAT SERPL-MCNC: 1 MG/DL (ref 0.5–1.4)
DIFFERENTIAL METHOD: ABNORMAL
EOSINOPHIL # BLD AUTO: 0.3 K/UL (ref 0–0.5)
EOSINOPHIL NFR BLD: 2.3 % (ref 0–8)
ERYTHROCYTE [DISTWIDTH] IN BLOOD BY AUTOMATED COUNT: 20.7 % (ref 11.5–14.5)
EST. GFR  (NO RACE VARIABLE): >60 ML/MIN/1.73 M^2
GLUCOSE SERPL-MCNC: 279 MG/DL (ref 70–110)
HCT VFR BLD AUTO: 37.3 % (ref 37–48.5)
HGB BLD-MCNC: 11.4 G/DL (ref 12–16)
IMM GRANULOCYTES # BLD AUTO: 0.1 K/UL (ref 0–0.04)
IMM GRANULOCYTES NFR BLD AUTO: 0.7 % (ref 0–0.5)
LYMPHOCYTES # BLD AUTO: 5.7 K/UL (ref 1–4.8)
LYMPHOCYTES NFR BLD: 42.3 % (ref 18–48)
MCH RBC QN AUTO: 22.4 PG (ref 27–31)
MCHC RBC AUTO-ENTMCNC: 30.6 G/DL (ref 32–36)
MCV RBC AUTO: 73 FL (ref 82–98)
MONOCYTES # BLD AUTO: 1.1 K/UL (ref 0.3–1)
MONOCYTES NFR BLD: 7.8 % (ref 4–15)
NEUTROPHILS # BLD AUTO: 6.2 K/UL (ref 1.8–7.7)
NEUTROPHILS NFR BLD: 46 % (ref 38–73)
NRBC BLD-RTO: 0 /100 WBC
PLATELET # BLD AUTO: 692 K/UL (ref 150–450)
PMV BLD AUTO: 9.8 FL (ref 9.2–12.9)
POTASSIUM SERPL-SCNC: 4.6 MMOL/L (ref 3.5–5.1)
PROT SERPL-MCNC: 6.7 G/DL (ref 6–8.4)
RBC # BLD AUTO: 5.09 M/UL (ref 4–5.4)
SODIUM SERPL-SCNC: 141 MMOL/L (ref 136–145)
WBC # BLD AUTO: 13.51 K/UL (ref 3.9–12.7)

## 2023-04-11 PROCEDURE — 36415 COLL VENOUS BLD VENIPUNCTURE: CPT | Performed by: SURGERY

## 2023-04-11 PROCEDURE — 80053 COMPREHEN METABOLIC PANEL: CPT | Performed by: SURGERY

## 2023-04-11 PROCEDURE — 85025 COMPLETE CBC W/AUTO DIFF WBC: CPT | Performed by: SURGERY

## 2023-04-11 NOTE — DISCHARGE INSTRUCTIONS
Before 7 AM, enter through the Emergency Entrance..   After 7 AM enter through the Main Entrance.      Your procedure  is scheduled for _____4/19/2023_____.    Call [phone] between 2pm and 5pm on __4/18/2023_____to find out your arrival time for the day of surgery.    You may use the main entrance to the hospital on the [facility] side, or the entrance that is next to the [facility].    You may have one visitor.  No children allowed.     You will be going to the Same Day Surgery Unit on the 2nd floor of the hospital.    Important instructions:  Do not eat anything after midnight.  You may have plain water, non carbonated.  You may also have Gatorade or Powerade after midnight.    Stop all fluids 2 hours before your surgery.    It is okay to brush your teeth.  Do not have gum, candy or mints.    SEE MEDICATION SHEET.   TAKE MEDICATIONS AS DIRECTED WITH SIPS OF WATER.      Do not take any diabetic medication on the morning of surgery unless instructed to do so by your doctor or pre op nurse.    STOP taking Aspirin, Ibuprofen,  Advil, Motrin, Mobic(meloxicam), Aleve (naproxen), Fish oil, and Vitamin E for at least 7 days before your surgery.     You may take Tylenol if needed which is not a blood thinner.    Please shower the night before and the morning of your surgery.      Follow any Prep Instructions given by your surgeon.    Use Chlorhexidine soap as instructed by your pre op nurse.   Please place clean linens on your bed the night before surgery. Please wear fresh clean clothing after each shower.    No shaving of procedural area at least 4-5 days before surgery due to increased risk of skin irritation and/or possible infection.    Female patients may be asked for a urine specimen on the morning of the surgery.  Please check with your nurse before using the restroom.    Contact lenses and removable denture work may not be worn during your procedure.    You may wear deodorant only. If you are  having breast surgery, do not wear deodorant on the operative side.    Do not wear powder, body lotion, perfume/cologne or make-up.    Do not wear any jewelry or have any metal on your body.    You will be asked to remove any dentures or partials for the procedure.    If you are going home on the same day of surgery, you must arrange for a family member or a friend to drive you home.  Public transportation is prohibited.  You will not be able to drive home if you were given anesthesia or sedation.    Patients who want to have their Post-op prescriptions filled from our in-house [facility], bring a Credit/Debit Card or cash with you. A co-pay may be required.  The pharmacy closes at 5:30 pm.    Wear loose fitting clothes allowing for bandages.    Please leave money and valuables home.      You may bring your cell phone.    Call the doctor if fever or illness should occur before your surgery.    Call [phone] to contact us here if needed.                            CLOTHES ON DAY OF SURGERY    SHOULDER surgery:  you must have a very oversized shirt.  Very, Very large.  You will probably have a large sling on with your arm strapped to your chest.  You will not be able to put the arm of the operated shoulder into a sleeve.  You can put the arm of the un-operated shoulder into the sleeve, but the shirt will need to be draped over the operated shoulder.       ARM or HAND surgery:  make sure that your sleeves are large and loose enough to pass over large dressings or cast.      BREAST or UNDERARM surgery:  wear a loose, button down shirt so that you can dress without raising your arms over your head.    ABDOMINAL surgery:  wear loose, comfortable clothing.  Nothing tight around the abdomen.  NO JEANS    PENIS or SCROTAL surgery:  loose comfortable clothing.  Large sweat pants, pajama pants or a robe.  ABSOLUTELY NO JEANS      LEG or FOOT surgery:  wear large loose pants that are able to pass over any large dressings  or casts.  You could also wear loose shorts or a skirt.

## 2023-04-12 ENCOUNTER — PATIENT MESSAGE (OUTPATIENT)
Dept: ADMINISTRATIVE | Facility: HOSPITAL | Age: 53
End: 2023-04-12
Payer: MEDICAID

## 2023-04-19 ENCOUNTER — ANESTHESIA (OUTPATIENT)
Dept: SURGERY | Facility: HOSPITAL | Age: 53
DRG: 617 | End: 2023-04-19
Payer: MEDICAID

## 2023-04-19 ENCOUNTER — HOSPITAL ENCOUNTER (INPATIENT)
Facility: HOSPITAL | Age: 53
LOS: 4 days | Discharge: NURSING, REHAB OR LONG-TERM CARE | DRG: 617 | End: 2023-04-23
Attending: SURGERY | Admitting: SURGERY
Payer: MEDICAID

## 2023-04-19 DIAGNOSIS — E11.49 TYPE II DIABETES MELLITUS WITH NEUROLOGICAL MANIFESTATIONS: Primary | ICD-10-CM

## 2023-04-19 DIAGNOSIS — L97.423 LEFT MIDFOOT ULCER, WITH NECROSIS OF MUSCLE: ICD-10-CM

## 2023-04-19 LAB
ACID FAST MOD KINY STN SPEC: NORMAL
MYCOBACTERIUM SPEC QL CULT: NORMAL
POCT GLUCOSE: 172 MG/DL (ref 70–110)
POCT GLUCOSE: 186 MG/DL (ref 70–110)
POCT GLUCOSE: 226 MG/DL (ref 70–110)
POCT GLUCOSE: 249 MG/DL (ref 70–110)
POCT GLUCOSE: 267 MG/DL (ref 70–110)

## 2023-04-19 PROCEDURE — 63600175 PHARM REV CODE 636 W HCPCS: Performed by: NURSE ANESTHETIST, CERTIFIED REGISTERED

## 2023-04-19 PROCEDURE — 25000003 PHARM REV CODE 250: Performed by: NURSE ANESTHETIST, CERTIFIED REGISTERED

## 2023-04-19 PROCEDURE — 63600175 PHARM REV CODE 636 W HCPCS: Performed by: ANESTHESIOLOGY

## 2023-04-19 PROCEDURE — 37000009 HC ANESTHESIA EA ADD 15 MINS: Performed by: SURGERY

## 2023-04-19 PROCEDURE — D9220A PRA ANESTHESIA: Mod: ANES,,, | Performed by: ANESTHESIOLOGY

## 2023-04-19 PROCEDURE — 88307 TISSUE EXAM BY PATHOLOGIST: CPT | Mod: 26,,, | Performed by: PATHOLOGY

## 2023-04-19 PROCEDURE — 27880 AMPUTATION OF LOWER LEG: CPT | Mod: LT,,, | Performed by: SURGERY

## 2023-04-19 PROCEDURE — 76942 ECHO GUIDE FOR BIOPSY: CPT | Mod: 26,59,, | Performed by: ANESTHESIOLOGY

## 2023-04-19 PROCEDURE — 63600175 PHARM REV CODE 636 W HCPCS: Performed by: SURGERY

## 2023-04-19 PROCEDURE — 25000003 PHARM REV CODE 250: Performed by: STUDENT IN AN ORGANIZED HEALTH CARE EDUCATION/TRAINING PROGRAM

## 2023-04-19 PROCEDURE — 25000003 PHARM REV CODE 250: Performed by: ANESTHESIOLOGY

## 2023-04-19 PROCEDURE — 64450 PR NERVE BLOCK INJ, ANES/STEROID, OTHER PERIPHERAL: ICD-10-PCS | Mod: 59,LT,, | Performed by: ANESTHESIOLOGY

## 2023-04-19 PROCEDURE — 64450 NJX AA&/STRD OTHER PN/BRANCH: CPT | Mod: 59,LT,, | Performed by: ANESTHESIOLOGY

## 2023-04-19 PROCEDURE — 63600175 PHARM REV CODE 636 W HCPCS: Performed by: STUDENT IN AN ORGANIZED HEALTH CARE EDUCATION/TRAINING PROGRAM

## 2023-04-19 PROCEDURE — 25000003 PHARM REV CODE 250: Performed by: SURGERY

## 2023-04-19 PROCEDURE — D9220A PRA ANESTHESIA: ICD-10-PCS | Mod: CRNA,,, | Performed by: NURSE ANESTHETIST, CERTIFIED REGISTERED

## 2023-04-19 PROCEDURE — 76942 PR U/S GUIDANCE FOR NEEDLE GUIDANCE: ICD-10-PCS | Mod: 26,59,, | Performed by: ANESTHESIOLOGY

## 2023-04-19 PROCEDURE — 64447 PR NERVE BLOCK INJ, ANES/STEROID, FEMORAL, INCL IMAG GUIDANCE: ICD-10-PCS | Mod: 59,LT,, | Performed by: ANESTHESIOLOGY

## 2023-04-19 PROCEDURE — 37000008 HC ANESTHESIA 1ST 15 MINUTES: Performed by: SURGERY

## 2023-04-19 PROCEDURE — 88307 TISSUE EXAM BY PATHOLOGIST: CPT | Performed by: PATHOLOGY

## 2023-04-19 PROCEDURE — 64450 PERIPHERAL BLOCK: ICD-10-PCS | Mod: 59,LT,, | Performed by: ANESTHESIOLOGY

## 2023-04-19 PROCEDURE — 64447 NJX AA&/STRD FEMORAL NRV IMG: CPT | Mod: 59,LT,, | Performed by: ANESTHESIOLOGY

## 2023-04-19 PROCEDURE — 71000039 HC RECOVERY, EACH ADD'L HOUR: Performed by: SURGERY

## 2023-04-19 PROCEDURE — D9220A PRA ANESTHESIA: Mod: CRNA,,, | Performed by: NURSE ANESTHETIST, CERTIFIED REGISTERED

## 2023-04-19 PROCEDURE — 36000710: Performed by: SURGERY

## 2023-04-19 PROCEDURE — 71000033 HC RECOVERY, INTIAL HOUR: Performed by: SURGERY

## 2023-04-19 PROCEDURE — 27201423 OPTIME MED/SURG SUP & DEVICES STERILE SUPPLY: Performed by: SURGERY

## 2023-04-19 PROCEDURE — 64445 NJX AA&/STRD SCIATIC NRV IMG: CPT | Performed by: ANESTHESIOLOGY

## 2023-04-19 PROCEDURE — 11000001 HC ACUTE MED/SURG PRIVATE ROOM

## 2023-04-19 PROCEDURE — 64447 PERIPHERAL BLOCK: ICD-10-PCS | Mod: 59,LT,, | Performed by: ANESTHESIOLOGY

## 2023-04-19 PROCEDURE — D9220A PRA ANESTHESIA: ICD-10-PCS | Mod: ANES,,, | Performed by: ANESTHESIOLOGY

## 2023-04-19 PROCEDURE — 27880 PR AMPUTATION LOW LEG THRU TIB/FIB: ICD-10-PCS | Mod: LT,,, | Performed by: SURGERY

## 2023-04-19 PROCEDURE — 76942 ECHO GUIDE FOR BIOPSY: CPT | Mod: 26,,, | Performed by: ANESTHESIOLOGY

## 2023-04-19 PROCEDURE — 76942 PR U/S GUIDANCE FOR NEEDLE GUIDANCE: ICD-10-PCS | Mod: 26,,, | Performed by: ANESTHESIOLOGY

## 2023-04-19 PROCEDURE — 36000711: Performed by: SURGERY

## 2023-04-19 PROCEDURE — 88307 PR  SURG PATH,LEVEL V: ICD-10-PCS | Mod: 26,,, | Performed by: PATHOLOGY

## 2023-04-19 RX ORDER — POLYETHYLENE GLYCOL 3350 17 G/17G
17 POWDER, FOR SOLUTION ORAL DAILY
Status: DISCONTINUED | OUTPATIENT
Start: 2023-04-19 | End: 2023-04-23 | Stop reason: HOSPADM

## 2023-04-19 RX ORDER — PANTOPRAZOLE SODIUM 40 MG/1
40 TABLET, DELAYED RELEASE ORAL DAILY
Status: DISCONTINUED | OUTPATIENT
Start: 2023-04-19 | End: 2023-04-23 | Stop reason: HOSPADM

## 2023-04-19 RX ORDER — IPRATROPIUM BROMIDE AND ALBUTEROL SULFATE 2.5; .5 MG/3ML; MG/3ML
3 SOLUTION RESPIRATORY (INHALATION) EVERY 4 HOURS PRN
Status: DISCONTINUED | OUTPATIENT
Start: 2023-04-19 | End: 2023-04-23 | Stop reason: HOSPADM

## 2023-04-19 RX ORDER — ONDANSETRON 2 MG/ML
INJECTION INTRAMUSCULAR; INTRAVENOUS
Status: DISCONTINUED | OUTPATIENT
Start: 2023-04-19 | End: 2023-04-19

## 2023-04-19 RX ORDER — RISPERIDONE 1 MG/1
3 TABLET ORAL 2 TIMES DAILY
Status: DISCONTINUED | OUTPATIENT
Start: 2023-04-19 | End: 2023-04-23 | Stop reason: HOSPADM

## 2023-04-19 RX ORDER — METOPROLOL TARTRATE 25 MG/1
25 TABLET, FILM COATED ORAL 2 TIMES DAILY
Status: DISCONTINUED | OUTPATIENT
Start: 2023-04-19 | End: 2023-04-23 | Stop reason: HOSPADM

## 2023-04-19 RX ORDER — METHOCARBAMOL 500 MG/1
500 TABLET, FILM COATED ORAL 4 TIMES DAILY
Status: DISCONTINUED | OUTPATIENT
Start: 2023-04-19 | End: 2023-04-20

## 2023-04-19 RX ORDER — ACETAMINOPHEN 325 MG/1
650 TABLET ORAL EVERY 8 HOURS PRN
Status: DISCONTINUED | OUTPATIENT
Start: 2023-04-19 | End: 2023-04-23 | Stop reason: HOSPADM

## 2023-04-19 RX ORDER — CEFAZOLIN SODIUM 2 G/50ML
2 SOLUTION INTRAVENOUS
Status: COMPLETED | OUTPATIENT
Start: 2023-04-19 | End: 2023-04-19

## 2023-04-19 RX ORDER — TALC
6 POWDER (GRAM) TOPICAL NIGHTLY PRN
Status: DISCONTINUED | OUTPATIENT
Start: 2023-04-19 | End: 2023-04-23 | Stop reason: HOSPADM

## 2023-04-19 RX ORDER — BUPIVACAINE HYDROCHLORIDE 5 MG/ML
INJECTION, SOLUTION PERINEURAL
Status: DISCONTINUED | OUTPATIENT
Start: 2023-04-19 | End: 2023-04-19

## 2023-04-19 RX ORDER — ENOXAPARIN SODIUM 100 MG/ML
40 INJECTION SUBCUTANEOUS EVERY 24 HOURS
Status: DISCONTINUED | OUTPATIENT
Start: 2023-04-19 | End: 2023-04-23 | Stop reason: HOSPADM

## 2023-04-19 RX ORDER — FENTANYL CITRATE 50 UG/ML
INJECTION, SOLUTION INTRAMUSCULAR; INTRAVENOUS
Status: DISCONTINUED | OUTPATIENT
Start: 2023-04-19 | End: 2023-04-19

## 2023-04-19 RX ORDER — DIVALPROEX SODIUM 250 MG/1
500 TABLET, DELAYED RELEASE ORAL DAILY
Status: DISCONTINUED | OUTPATIENT
Start: 2023-04-19 | End: 2023-04-23 | Stop reason: HOSPADM

## 2023-04-19 RX ORDER — SODIUM CHLORIDE 9 MG/ML
INJECTION, SOLUTION INTRAVENOUS CONTINUOUS
Status: DISCONTINUED | OUTPATIENT
Start: 2023-04-19 | End: 2023-04-19

## 2023-04-19 RX ORDER — NAPROXEN SODIUM 220 MG/1
81 TABLET, FILM COATED ORAL DAILY
Status: DISCONTINUED | OUTPATIENT
Start: 2023-04-19 | End: 2023-04-23 | Stop reason: HOSPADM

## 2023-04-19 RX ORDER — OXYCODONE HYDROCHLORIDE 5 MG/1
5 TABLET ORAL EVERY 4 HOURS PRN
Status: DISCONTINUED | OUTPATIENT
Start: 2023-04-19 | End: 2023-04-21

## 2023-04-19 RX ORDER — FENTANYL CITRATE 50 UG/ML
50 INJECTION, SOLUTION INTRAMUSCULAR; INTRAVENOUS EVERY 5 MIN PRN
Status: DISCONTINUED | OUTPATIENT
Start: 2023-04-19 | End: 2023-04-19

## 2023-04-19 RX ORDER — SODIUM CHLORIDE 0.9 % (FLUSH) 0.9 %
10 SYRINGE (ML) INJECTION
Status: DISCONTINUED | OUTPATIENT
Start: 2023-04-19 | End: 2023-04-23 | Stop reason: HOSPADM

## 2023-04-19 RX ORDER — IBUPROFEN 200 MG
1 TABLET ORAL DAILY
Status: DISCONTINUED | OUTPATIENT
Start: 2023-04-19 | End: 2023-04-23 | Stop reason: HOSPADM

## 2023-04-19 RX ORDER — PROPOFOL 10 MG/ML
VIAL (ML) INTRAVENOUS
Status: DISCONTINUED | OUTPATIENT
Start: 2023-04-19 | End: 2023-04-19

## 2023-04-19 RX ORDER — AMOXICILLIN 250 MG
1 CAPSULE ORAL DAILY
Status: DISCONTINUED | OUTPATIENT
Start: 2023-04-19 | End: 2023-04-23 | Stop reason: HOSPADM

## 2023-04-19 RX ORDER — GLUCAGON 1 MG
1 KIT INJECTION
Status: DISCONTINUED | OUTPATIENT
Start: 2023-04-19 | End: 2023-04-23 | Stop reason: HOSPADM

## 2023-04-19 RX ORDER — ROCURONIUM BROMIDE 10 MG/ML
INJECTION, SOLUTION INTRAVENOUS
Status: DISCONTINUED | OUTPATIENT
Start: 2023-04-19 | End: 2023-04-19

## 2023-04-19 RX ORDER — HYDROMORPHONE HYDROCHLORIDE 2 MG/ML
1 INJECTION, SOLUTION INTRAMUSCULAR; INTRAVENOUS; SUBCUTANEOUS
Status: DISCONTINUED | OUTPATIENT
Start: 2023-04-19 | End: 2023-04-19

## 2023-04-19 RX ORDER — LABETALOL HYDROCHLORIDE 5 MG/ML
10 INJECTION, SOLUTION INTRAVENOUS EVERY 4 HOURS PRN
Status: DISCONTINUED | OUTPATIENT
Start: 2023-04-19 | End: 2023-04-23 | Stop reason: HOSPADM

## 2023-04-19 RX ORDER — IBUPROFEN 200 MG
24 TABLET ORAL
Status: DISCONTINUED | OUTPATIENT
Start: 2023-04-19 | End: 2023-04-23 | Stop reason: HOSPADM

## 2023-04-19 RX ORDER — SODIUM CHLORIDE 0.9 % (FLUSH) 0.9 %
10 SYRINGE (ML) INJECTION
Status: DISCONTINUED | OUTPATIENT
Start: 2023-04-19 | End: 2023-04-19

## 2023-04-19 RX ORDER — PROPOFOL 10 MG/ML
VIAL (ML) INTRAVENOUS CONTINUOUS PRN
Status: DISCONTINUED | OUTPATIENT
Start: 2023-04-19 | End: 2023-04-19

## 2023-04-19 RX ORDER — ACETAMINOPHEN 500 MG
1000 TABLET ORAL EVERY 8 HOURS
Status: DISCONTINUED | OUTPATIENT
Start: 2023-04-19 | End: 2023-04-23 | Stop reason: HOSPADM

## 2023-04-19 RX ORDER — LIDOCAINE HYDROCHLORIDE 20 MG/ML
INJECTION INTRAVENOUS
Status: DISCONTINUED | OUTPATIENT
Start: 2023-04-19 | End: 2023-04-19

## 2023-04-19 RX ORDER — HYDROMORPHONE HYDROCHLORIDE 2 MG/ML
0.2 INJECTION, SOLUTION INTRAMUSCULAR; INTRAVENOUS; SUBCUTANEOUS EVERY 5 MIN PRN
Status: DISCONTINUED | OUTPATIENT
Start: 2023-04-19 | End: 2023-04-19

## 2023-04-19 RX ORDER — BUMETANIDE 1 MG/1
1 TABLET ORAL DAILY
Status: DISCONTINUED | OUTPATIENT
Start: 2023-04-19 | End: 2023-04-23 | Stop reason: HOSPADM

## 2023-04-19 RX ORDER — HYDRALAZINE HYDROCHLORIDE 20 MG/ML
10 INJECTION INTRAMUSCULAR; INTRAVENOUS EVERY 4 HOURS PRN
Status: DISCONTINUED | OUTPATIENT
Start: 2023-04-19 | End: 2023-04-23 | Stop reason: HOSPADM

## 2023-04-19 RX ORDER — IBUPROFEN 200 MG
16 TABLET ORAL
Status: DISCONTINUED | OUTPATIENT
Start: 2023-04-19 | End: 2023-04-23 | Stop reason: HOSPADM

## 2023-04-19 RX ORDER — ACETAMINOPHEN 500 MG
1000 TABLET ORAL
Status: COMPLETED | OUTPATIENT
Start: 2023-04-19 | End: 2023-04-19

## 2023-04-19 RX ORDER — LIDOCAINE HYDROCHLORIDE 10 MG/ML
1 INJECTION, SOLUTION EPIDURAL; INFILTRATION; INTRACAUDAL; PERINEURAL ONCE
Status: DISCONTINUED | OUTPATIENT
Start: 2023-04-19 | End: 2023-04-19

## 2023-04-19 RX ORDER — HYDROMORPHONE HYDROCHLORIDE 1 MG/ML
0.5 INJECTION, SOLUTION INTRAMUSCULAR; INTRAVENOUS; SUBCUTANEOUS
Status: DISCONTINUED | OUTPATIENT
Start: 2023-04-19 | End: 2023-04-21

## 2023-04-19 RX ORDER — MIDAZOLAM HYDROCHLORIDE 1 MG/ML
INJECTION, SOLUTION INTRAMUSCULAR; INTRAVENOUS
Status: DISCONTINUED | OUTPATIENT
Start: 2023-04-19 | End: 2023-04-19

## 2023-04-19 RX ORDER — INSULIN ASPART 100 [IU]/ML
0-5 INJECTION, SOLUTION INTRAVENOUS; SUBCUTANEOUS
Status: DISCONTINUED | OUTPATIENT
Start: 2023-04-19 | End: 2023-04-21

## 2023-04-19 RX ORDER — CETIRIZINE HYDROCHLORIDE 10 MG/1
10 TABLET ORAL DAILY
Status: DISCONTINUED | OUTPATIENT
Start: 2023-04-19 | End: 2023-04-23 | Stop reason: HOSPADM

## 2023-04-19 RX ORDER — OXYCODONE HYDROCHLORIDE 5 MG/1
5 TABLET ORAL EVERY 4 HOURS PRN
Status: DISCONTINUED | OUTPATIENT
Start: 2023-04-19 | End: 2023-04-23 | Stop reason: HOSPADM

## 2023-04-19 RX ORDER — HYDROMORPHONE HYDROCHLORIDE 1 MG/ML
1 INJECTION, SOLUTION INTRAMUSCULAR; INTRAVENOUS; SUBCUTANEOUS
Status: DISCONTINUED | OUTPATIENT
Start: 2023-04-19 | End: 2023-04-19

## 2023-04-19 RX ORDER — ONDANSETRON 8 MG/1
8 TABLET, ORALLY DISINTEGRATING ORAL EVERY 6 HOURS PRN
Status: DISCONTINUED | OUTPATIENT
Start: 2023-04-19 | End: 2023-04-23 | Stop reason: HOSPADM

## 2023-04-19 RX ORDER — HYDROXYZINE HYDROCHLORIDE 25 MG/1
25 TABLET, FILM COATED ORAL 4 TIMES DAILY PRN
Status: DISCONTINUED | OUTPATIENT
Start: 2023-04-19 | End: 2023-04-23 | Stop reason: HOSPADM

## 2023-04-19 RX ORDER — PROMETHAZINE HYDROCHLORIDE 25 MG/1
25 TABLET ORAL EVERY 6 HOURS PRN
Status: DISCONTINUED | OUTPATIENT
Start: 2023-04-19 | End: 2023-04-23 | Stop reason: HOSPADM

## 2023-04-19 RX ORDER — SODIUM CHLORIDE, SODIUM LACTATE, POTASSIUM CHLORIDE, CALCIUM CHLORIDE 600; 310; 30; 20 MG/100ML; MG/100ML; MG/100ML; MG/100ML
INJECTION, SOLUTION INTRAVENOUS CONTINUOUS
Status: DISCONTINUED | OUTPATIENT
Start: 2023-04-19 | End: 2023-04-19

## 2023-04-19 RX ADMIN — METOPROLOL TARTRATE 25 MG: 25 TABLET, FILM COATED ORAL at 08:04

## 2023-04-19 RX ADMIN — BUPIVACAINE HYDROCHLORIDE 35 ML: 5 INJECTION, SOLUTION PERINEURAL at 08:04

## 2023-04-19 RX ADMIN — ACETAMINOPHEN 1000 MG: 500 TABLET ORAL at 07:04

## 2023-04-19 RX ADMIN — HYDROMORPHONE HYDROCHLORIDE 0.2 MG: 2 INJECTION INTRAMUSCULAR; INTRAVENOUS; SUBCUTANEOUS at 10:04

## 2023-04-19 RX ADMIN — HYDROMORPHONE HYDROCHLORIDE 1 MG: 2 INJECTION INTRAMUSCULAR; INTRAVENOUS; SUBCUTANEOUS at 12:04

## 2023-04-19 RX ADMIN — SODIUM CHLORIDE, POTASSIUM CHLORIDE, SODIUM LACTATE AND CALCIUM CHLORIDE: 600; 310; 30; 20 INJECTION, SOLUTION INTRAVENOUS at 07:04

## 2023-04-19 RX ADMIN — OXYCODONE 5 MG: 5 TABLET ORAL at 08:04

## 2023-04-19 RX ADMIN — FENTANYL CITRATE 50 MCG: 50 INJECTION, SOLUTION INTRAMUSCULAR; INTRAVENOUS at 08:04

## 2023-04-19 RX ADMIN — SODIUM CHLORIDE: 9 INJECTION, SOLUTION INTRAVENOUS at 11:04

## 2023-04-19 RX ADMIN — INSULIN ASPART 1 UNITS: 100 INJECTION, SOLUTION INTRAVENOUS; SUBCUTANEOUS at 08:04

## 2023-04-19 RX ADMIN — OXYCODONE 5 MG: 5 TABLET ORAL at 04:04

## 2023-04-19 RX ADMIN — SUGAMMADEX 200 MG: 100 INJECTION, SOLUTION INTRAVENOUS at 10:04

## 2023-04-19 RX ADMIN — HYDROMORPHONE HYDROCHLORIDE 0.2 MG: 2 INJECTION INTRAMUSCULAR; INTRAVENOUS; SUBCUTANEOUS at 11:04

## 2023-04-19 RX ADMIN — ENOXAPARIN SODIUM 40 MG: 40 INJECTION SUBCUTANEOUS at 04:04

## 2023-04-19 RX ADMIN — Medication 6 MG: at 09:04

## 2023-04-19 RX ADMIN — FENTANYL CITRATE 50 MCG: 50 INJECTION, SOLUTION INTRAMUSCULAR; INTRAVENOUS at 09:04

## 2023-04-19 RX ADMIN — FENTANYL CITRATE 50 MCG: 50 INJECTION INTRAMUSCULAR; INTRAVENOUS at 11:04

## 2023-04-19 RX ADMIN — PROPOFOL 150 MG: 10 INJECTION, EMULSION INTRAVENOUS at 09:04

## 2023-04-19 RX ADMIN — INSULIN ASPART 2 UNITS: 100 INJECTION, SOLUTION INTRAVENOUS; SUBCUTANEOUS at 04:04

## 2023-04-19 RX ADMIN — FENTANYL CITRATE 100 MCG: 50 INJECTION, SOLUTION INTRAMUSCULAR; INTRAVENOUS at 10:04

## 2023-04-19 RX ADMIN — ONDANSETRON 4 MG: 2 INJECTION, SOLUTION INTRAMUSCULAR; INTRAVENOUS at 09:04

## 2023-04-19 RX ADMIN — RISPERIDONE 3 MG: 1 TABLET ORAL at 08:04

## 2023-04-19 RX ADMIN — HYDROMORPHONE HYDROCHLORIDE 0.5 MG: 1 INJECTION, SOLUTION INTRAMUSCULAR; INTRAVENOUS; SUBCUTANEOUS at 06:04

## 2023-04-19 RX ADMIN — ACETAMINOPHEN 1000 MG: 500 TABLET, FILM COATED ORAL at 09:04

## 2023-04-19 RX ADMIN — OXYCODONE HYDROCHLORIDE 5 MG: 5 TABLET ORAL at 11:04

## 2023-04-19 RX ADMIN — PROPOFOL 20 MG: 10 INJECTION, EMULSION INTRAVENOUS at 09:04

## 2023-04-19 RX ADMIN — PROPOFOL 40 MCG/KG/MIN: 10 INJECTION, EMULSION INTRAVENOUS at 09:04

## 2023-04-19 RX ADMIN — METHOCARBAMOL 500 MG: 500 TABLET ORAL at 04:04

## 2023-04-19 RX ADMIN — SODIUM CHLORIDE, POTASSIUM CHLORIDE, SODIUM LACTATE AND CALCIUM CHLORIDE: 600; 310; 30; 20 INJECTION, SOLUTION INTRAVENOUS at 08:04

## 2023-04-19 RX ADMIN — METHOCARBAMOL 500 MG: 500 TABLET ORAL at 08:04

## 2023-04-19 RX ADMIN — LIDOCAINE HYDROCHLORIDE 60 MG: 20 INJECTION, SOLUTION INTRAVENOUS at 09:04

## 2023-04-19 RX ADMIN — ROCURONIUM BROMIDE 50 MG: 10 INJECTION, SOLUTION INTRAVENOUS at 09:04

## 2023-04-19 RX ADMIN — MIDAZOLAM HYDROCHLORIDE 1 MG: 1 INJECTION, SOLUTION INTRAMUSCULAR; INTRAVENOUS at 09:04

## 2023-04-19 RX ADMIN — HYDROXYZINE HYDROCHLORIDE 25 MG: 25 TABLET ORAL at 09:04

## 2023-04-19 RX ADMIN — MIDAZOLAM HYDROCHLORIDE 1 MG: 1 INJECTION, SOLUTION INTRAMUSCULAR; INTRAVENOUS at 08:04

## 2023-04-19 RX ADMIN — CEFAZOLIN SODIUM 2 G: 2 SOLUTION INTRAVENOUS at 08:04

## 2023-04-19 RX ADMIN — HYDROMORPHONE HYDROCHLORIDE 0.5 MG: 1 INJECTION, SOLUTION INTRAMUSCULAR; INTRAVENOUS; SUBCUTANEOUS at 09:04

## 2023-04-19 NOTE — OP NOTE
DATE OF PROCEDURE: 04/19/2023    PRE-OP DIAGNOSIS: Left midfoot ulcer, with necrosis, chronic osteomyelitis    POST-OP DIAGNOSIS: same.    SURGEON: Gabe Munoz MD    RESIDENT: Aniya Cordon MD     PROCEDURE(s):   Left below the knee amputation    INDICATION FOR PROCEDURE(s): Audrey Natarajan is a 53yo F referred to General Surgery Clinic with a history of chronic left foot ulcer. The history and exam were consistent with chronic osteomyelitis that has failed medical management. We recommended below the knee amputation and the patient agreed to proceed. The patient signed informed consent and expressed understanding of the risks and benefits of surgery.     PROCEDURE IN DETAIL: The patient was identified in preoperative holding and a local regional block was done per anesthesia. She was brought back to the Operating Room. The patient was placed supine on the operating table and padded appropriately. Monitors were applied and monitored anesthesia care was initiated. A tourniquet was applied to her left thigh. Her left foot was prepped and draped in the standard sterile surgical fashion. A timeout was performed and all team members present agreed this was the correct procedure on the correct side of the correct patient. We also confirmed administration of appropriate preoperative antibiotics.     The left lower extremity was marked approximately 12cm below the tibial tuberosity and a skin flap was marked. We then used a scalpel to make the anterior leg incision and deepened with electrocautery. The patient was experiencing discomfort and anesthesia was called into the room and the patient was intubated. Once the patient was intubated and sedated, we divided the underlying soft tissues and muscles. The tibia was cleaned with a periosteal elevator. We then identified and skeletonized the fibula. An electric saw was used to divide the tibia as proximal as possible at an angle. The edges were not sharp. The fibula was  then divided similarly but more proximal than the tibia. The popliteal artery was ligated with silk ties. We used a scalpel to complete our amputation just under the fibula extending for a long flap. The specimen was passed off the table and hemostasis was achieved. We closed the fascia and muscle overlying the bone with 1-0 Vicryl pop-offs. The tourniquet was then taken down, total time with the tourniquet up was 23 minutes. No significant bleeding was encountered. The soft tissue was reapproximated superficially with 2-0 pop-off Vicryls. We stapled the skin and a sterile dressing was applied in the Operating Room. The patient was transferred to the stretcher and subsequently to the Recovery Room in stable condition.    ANESTHESIA: Regional block followed by general anesthesia    COMPLICATIONS: none    ESTIMATED BLOOD LOSS: 50mL    SPECIMEN: Left lower extremity below the knee      Aniya Cordon MD  Pager: [phone]  General Surgery PGY-4  [facility]

## 2023-04-19 NOTE — ANESTHESIA PROCEDURE NOTES
Intubation    Date/Time: 4/19/2023 9:34 AM  Performed by: Tone Loo CRNA  Authorized by: Cali Mcdonough MD     Intubation:     Induction:  Intravenous    Intubated:  Postinduction    Mask Ventilation:  Easy mask    Attempts:  1    Attempted By:  CRNA    Method of Intubation:  Direct and video laryngoscopy    Blade:  Daniels 3    Laryngeal View Grade: Grade I - full view of cords      Difficult Airway Encountered?: No      Complications:  None    Airway Device:  Oral endotracheal tube    Airway Device Size:  7.0    Style/Cuff Inflation:  Cuffed    Inflation Amount (mL):  5    Tube secured:  21    Secured at:  The teeth    Placement Verified By:  Capnometry    Complicating Factors:  None    Findings Post-Intubation:  BS equal bilateral and atraumatic/condition of teeth unchanged

## 2023-04-19 NOTE — ANESTHESIA PROCEDURE NOTES
Peripheral Block    Patient location during procedure: pre-op   Block not for primary anesthetic.  Reason for block: at surgeon's request and post-op pain management   Post-op Pain Location: Left BKA   Start time: 4/19/2023 8:43 AM  Timeout: 4/19/2023 8:43 AM   End time: 4/19/2023 8:50 AM    Staffing  Authorizing Provider: Laxmi Mendosa MD  Performing Provider: Laxmi Mendosa MD    Preanesthetic Checklist  Completed: patient identified, IV checked, site marked, risks and benefits discussed, surgical consent, monitors and equipment checked, pre-op evaluation and timeout performed  Peripheral Block  Patient position: supine  Prep: ChloraPrep  Patient monitoring: heart rate, cardiac monitor, continuous pulse ox, continuous capnometry and frequent blood pressure checks  Block type: popliteal  Laterality: left  Injection technique: single shot  Needle  Needle type: Stimuplex   Needle gauge: 21 G  Needle length: 4 in  Needle localization: anatomical landmarks and ultrasound guidance   -ultrasound image captured on disc.  Assessment  Injection assessment: negative aspiration, negative parasthesia and local visualized surrounding nerve  Paresthesia pain: none  Heart rate change: no  Slow fractionated injection: yes  Pain Tolerance: comfortable throughout block and no complaints      Additional Notes  VSS.  [facility] RN monitoring vitals throughout procedure.  Patient tolerated procedure well. 20 mL 0.5% Bupi

## 2023-04-19 NOTE — OR NURSING
0743 - time out done for block per Dr Mendosa  Pt on monitors  0753 - block complete - pt tolerated well

## 2023-04-19 NOTE — NURSING
[facility]  Nurses Note -- 4 Eyes      4/19/2023       Skin assessed on: Admit      [x] No Pressure Injuries Present    []Prevention Measures Documented    [] Yes LDA  for Pressure Injury Previously documented     [] Yes New Pressure Injury Discovered   [] LDA for New Pressure Injury Added      Attending RN:  Bessy Garduno RN     Second RN:  Po,PCT

## 2023-04-19 NOTE — ANESTHESIA PROCEDURE NOTES
Peripheral Block    Patient location during procedure: pre-op   Block not for primary anesthetic.  Reason for block: at surgeon's request and post-op pain management   Post-op Pain Location: Left BKA   Start time: 4/19/2023 8:50 AM  Timeout: 4/19/2023 8:50 AM   End time: 4/19/2023 8:57 AM    Staffing  Authorizing Provider: Laxmi Mendosa MD  Performing Provider: Laxmi Mendosa MD    Preanesthetic Checklist  Completed: patient identified, IV checked, site marked, risks and benefits discussed, surgical consent, monitors and equipment checked, pre-op evaluation and timeout performed  Peripheral Block  Patient position: supine  Prep: ChloraPrep  Patient monitoring: heart rate, cardiac monitor, continuous pulse ox, continuous capnometry and frequent blood pressure checks  Block type: adductor canal  Laterality: left  Injection technique: single shot  Needle  Needle type: Stimuplex   Needle gauge: 21 G  Needle length: 4 in  Needle localization: anatomical landmarks and ultrasound guidance   -ultrasound image captured on disc.  Assessment  Injection assessment: negative aspiration, negative parasthesia and local visualized surrounding nerve  Paresthesia pain: none  Heart rate change: no  Slow fractionated injection: yes  Pain Tolerance: comfortable throughout block and no complaints      Additional Notes  VSS.  [facility] RN monitoring vitals throughout procedure.  Patient tolerated procedure well. 15 mL 0.5% Bupi

## 2023-04-19 NOTE — TRANSFER OF CARE
Anesthesia Transfer of Care Note    Patient: Audrey Natarajan    Procedure(s) Performed: Procedure(s) (LRB):  AMPUTATION, BELOW KNEE (Left)    Patient location: PACU    Anesthesia Type: general    Transport from OR: Transported from OR on room air with adequate spontaneous ventilation    Post pain: adequate analgesia    Post assessment: no apparent anesthetic complications and tolerated procedure well    Post vital signs: stable    Level of consciousness: awake    Nausea/Vomiting: no nausea/vomiting    Complications: none    Transfer of care protocol was followed      Last vitals:   Visit Vitals  /72   Pulse 102   Temp 37 °C (98.6 °F)   Resp 18   LMP 11/01/2011 (LMP Unknown)   SpO2 100%   Breastfeeding No

## 2023-04-19 NOTE — NURSING
Patient arrived to floor via stretcher via transporter from PACU.  Patient transferred to bed independently.  AAOX4.  Patient was oriented to room, information on whiteboard, and medication regimen.  Bed low, adequate lighting provided, side rails x2 up, call bell within reach. VSS.  Patient denied having any acute distress at this time.  None observed.  Will continue to monitor and follow treatment plan.  Family at bedside.

## 2023-04-19 NOTE — ANESTHESIA POSTPROCEDURE EVALUATION
Anesthesia Post Evaluation    Patient: Audrey Natarajan    Procedure(s) Performed: Procedure(s) (LRB):  AMPUTATION, BELOW KNEE (Left)    Final Anesthesia Type: general      Patient location during evaluation: PACU  Patient participation: Yes- Able to Participate  Level of consciousness: awake and alert  Post-procedure vital signs: reviewed and stable  Pain management: pain needs to be addressed  Airway patency: patent    PONV status at discharge: No PONV  Anesthetic complications: no      Cardiovascular status: blood pressure returned to baseline and hemodynamically stable  Respiratory status: unassisted, room air and spontaneous ventilation  Hydration status: euvolemic  Follow-up not needed.          Vitals Value Taken Time   /58 04/19/23 1117   Temp 37 °C (98.6 °F) 04/19/23 1040   Pulse 101 04/19/23 1124   Resp 14 04/19/23 1124   SpO2 97 % 04/19/23 1124   Vitals shown include unvalidated device data.      No case tracking events are documented in the log.      Pain/Kristi Score: Pain Rating Prior to Med Admin: 10 (4/19/2023 11:14 AM)  Pain Rating Post Med Admin: 10 (4/19/2023 11:14 AM)  Kristi Score: 9 (4/19/2023 11:07 AM)

## 2023-04-19 NOTE — NURSING TRANSFER
Nursing Transfer Note      4/19/2023     Reason patient is being transferred: admit from PACU    Transfer To: 400   From: PACU    Transfer via stretcher    Transfer with n/a    Transported by house transport    Medicines sent: NS    Any special needs or follow-up needed: no    Chart send with patient: Yes    Notified: Sister    Patient reassessed at: 04/19/2023  1330 (date, time)    Upon arrival to floor: patient oriented to room, call bell in reach, and bed in lowest position

## 2023-04-20 LAB
ANION GAP SERPL CALC-SCNC: 6 MMOL/L (ref 8–16)
BASOPHILS # BLD AUTO: 0.09 K/UL (ref 0–0.2)
BASOPHILS NFR BLD: 0.7 % (ref 0–1.9)
BUN SERPL-MCNC: 10 MG/DL (ref 6–20)
CALCIUM SERPL-MCNC: 8.5 MG/DL (ref 8.7–10.5)
CHLORIDE SERPL-SCNC: 101 MMOL/L (ref 95–110)
CO2 SERPL-SCNC: 31 MMOL/L (ref 23–29)
CREAT SERPL-MCNC: 0.7 MG/DL (ref 0.5–1.4)
DIFFERENTIAL METHOD: ABNORMAL
EOSINOPHIL # BLD AUTO: 0.5 K/UL (ref 0–0.5)
EOSINOPHIL NFR BLD: 3.4 % (ref 0–8)
ERYTHROCYTE [DISTWIDTH] IN BLOOD BY AUTOMATED COUNT: 19.8 % (ref 11.5–14.5)
EST. GFR  (NO RACE VARIABLE): >60 ML/MIN/1.73 M^2
GLUCOSE SERPL-MCNC: 203 MG/DL (ref 70–110)
HCT VFR BLD AUTO: 27.6 % (ref 37–48.5)
HGB BLD-MCNC: 8.6 G/DL (ref 12–16)
IMM GRANULOCYTES # BLD AUTO: 0.09 K/UL (ref 0–0.04)
IMM GRANULOCYTES NFR BLD AUTO: 0.7 % (ref 0–0.5)
LYMPHOCYTES # BLD AUTO: 4.9 K/UL (ref 1–4.8)
LYMPHOCYTES NFR BLD: 36.7 % (ref 18–48)
MCH RBC QN AUTO: 22.9 PG (ref 27–31)
MCHC RBC AUTO-ENTMCNC: 31.2 G/DL (ref 32–36)
MCV RBC AUTO: 73 FL (ref 82–98)
MONOCYTES # BLD AUTO: 1.8 K/UL (ref 0.3–1)
MONOCYTES NFR BLD: 13.2 % (ref 4–15)
NEUTROPHILS # BLD AUTO: 6 K/UL (ref 1.8–7.7)
NEUTROPHILS NFR BLD: 45.3 % (ref 38–73)
NRBC BLD-RTO: 0 /100 WBC
PLATELET # BLD AUTO: 602 K/UL (ref 150–450)
PMV BLD AUTO: 9.9 FL (ref 9.2–12.9)
POCT GLUCOSE: 205 MG/DL (ref 70–110)
POCT GLUCOSE: 239 MG/DL (ref 70–110)
POCT GLUCOSE: 359 MG/DL (ref 70–110)
POCT GLUCOSE: 388 MG/DL (ref 70–110)
POTASSIUM SERPL-SCNC: 4 MMOL/L (ref 3.5–5.1)
RBC # BLD AUTO: 3.76 M/UL (ref 4–5.4)
SODIUM SERPL-SCNC: 138 MMOL/L (ref 136–145)
WBC # BLD AUTO: 13.26 K/UL (ref 3.9–12.7)

## 2023-04-20 PROCEDURE — S4991 NICOTINE PATCH NONLEGEND: HCPCS | Performed by: STUDENT IN AN ORGANIZED HEALTH CARE EDUCATION/TRAINING PROGRAM

## 2023-04-20 PROCEDURE — 63600175 PHARM REV CODE 636 W HCPCS: Performed by: SURGERY

## 2023-04-20 PROCEDURE — 97161 PT EVAL LOW COMPLEX 20 MIN: CPT

## 2023-04-20 PROCEDURE — 99900035 HC TECH TIME PER 15 MIN (STAT)

## 2023-04-20 PROCEDURE — 25000003 PHARM REV CODE 250: Performed by: STUDENT IN AN ORGANIZED HEALTH CARE EDUCATION/TRAINING PROGRAM

## 2023-04-20 PROCEDURE — 25000003 PHARM REV CODE 250: Performed by: SURGERY

## 2023-04-20 PROCEDURE — 94761 N-INVAS EAR/PLS OXIMETRY MLT: CPT

## 2023-04-20 PROCEDURE — 36415 COLL VENOUS BLD VENIPUNCTURE: CPT | Performed by: STUDENT IN AN ORGANIZED HEALTH CARE EDUCATION/TRAINING PROGRAM

## 2023-04-20 PROCEDURE — 97165 OT EVAL LOW COMPLEX 30 MIN: CPT

## 2023-04-20 PROCEDURE — 63600175 PHARM REV CODE 636 W HCPCS: Performed by: STUDENT IN AN ORGANIZED HEALTH CARE EDUCATION/TRAINING PROGRAM

## 2023-04-20 PROCEDURE — 85025 COMPLETE CBC W/AUTO DIFF WBC: CPT | Performed by: STUDENT IN AN ORGANIZED HEALTH CARE EDUCATION/TRAINING PROGRAM

## 2023-04-20 PROCEDURE — 11000001 HC ACUTE MED/SURG PRIVATE ROOM

## 2023-04-20 PROCEDURE — 80048 BASIC METABOLIC PNL TOTAL CA: CPT | Performed by: STUDENT IN AN ORGANIZED HEALTH CARE EDUCATION/TRAINING PROGRAM

## 2023-04-20 RX ORDER — METHOCARBAMOL 500 MG/1
1000 TABLET, FILM COATED ORAL 3 TIMES DAILY
Status: DISCONTINUED | OUTPATIENT
Start: 2023-04-20 | End: 2023-04-23 | Stop reason: HOSPADM

## 2023-04-20 RX ORDER — MUPIROCIN 20 MG/G
OINTMENT TOPICAL 2 TIMES DAILY
Status: DISCONTINUED | OUTPATIENT
Start: 2023-04-20 | End: 2023-04-23 | Stop reason: HOSPADM

## 2023-04-20 RX ORDER — GABAPENTIN 300 MG/1
300 CAPSULE ORAL 3 TIMES DAILY
Status: DISCONTINUED | OUTPATIENT
Start: 2023-04-20 | End: 2023-04-21

## 2023-04-20 RX ORDER — LIDOCAINE 50 MG/G
1 PATCH TOPICAL
Status: DISCONTINUED | OUTPATIENT
Start: 2023-04-20 | End: 2023-04-23 | Stop reason: HOSPADM

## 2023-04-20 RX ADMIN — GABAPENTIN 300 MG: 300 CAPSULE ORAL at 08:04

## 2023-04-20 RX ADMIN — OXYCODONE 5 MG: 5 TABLET ORAL at 05:04

## 2023-04-20 RX ADMIN — OXYCODONE 5 MG: 5 TABLET ORAL at 10:04

## 2023-04-20 RX ADMIN — MUPIROCIN: 20 OINTMENT TOPICAL at 08:04

## 2023-04-20 RX ADMIN — ASPIRIN 81 MG 81 MG: 81 TABLET ORAL at 08:04

## 2023-04-20 RX ADMIN — BUMETANIDE 1 MG: 1 TABLET ORAL at 09:04

## 2023-04-20 RX ADMIN — INSULIN ASPART 2 UNITS: 100 INJECTION, SOLUTION INTRAVENOUS; SUBCUTANEOUS at 08:04

## 2023-04-20 RX ADMIN — METHOCARBAMOL 1000 MG: 500 TABLET ORAL at 02:04

## 2023-04-20 RX ADMIN — OXYCODONE 5 MG: 5 TABLET ORAL at 02:04

## 2023-04-20 RX ADMIN — CETIRIZINE HYDROCHLORIDE 10 MG: 10 TABLET, FILM COATED ORAL at 08:04

## 2023-04-20 RX ADMIN — METOPROLOL TARTRATE 25 MG: 25 TABLET, FILM COATED ORAL at 08:04

## 2023-04-20 RX ADMIN — Medication 6 MG: at 08:04

## 2023-04-20 RX ADMIN — RISPERIDONE 3 MG: 1 TABLET ORAL at 08:04

## 2023-04-20 RX ADMIN — INSULIN ASPART 5 UNITS: 100 INJECTION, SOLUTION INTRAVENOUS; SUBCUTANEOUS at 11:04

## 2023-04-20 RX ADMIN — DIVALPROEX SODIUM 500 MG: 250 TABLET, DELAYED RELEASE ORAL at 08:04

## 2023-04-20 RX ADMIN — INSULIN ASPART 2 UNITS: 100 INJECTION, SOLUTION INTRAVENOUS; SUBCUTANEOUS at 04:04

## 2023-04-20 RX ADMIN — ENOXAPARIN SODIUM 40 MG: 40 INJECTION SUBCUTANEOUS at 04:04

## 2023-04-20 RX ADMIN — OXYCODONE 5 MG: 5 TABLET ORAL at 11:04

## 2023-04-20 RX ADMIN — ACETAMINOPHEN 1000 MG: 500 TABLET, FILM COATED ORAL at 02:04

## 2023-04-20 RX ADMIN — LIDOCAINE 1 PATCH: 50 PATCH TOPICAL at 08:04

## 2023-04-20 RX ADMIN — HYDROMORPHONE HYDROCHLORIDE 0.5 MG: 1 INJECTION, SOLUTION INTRAMUSCULAR; INTRAVENOUS; SUBCUTANEOUS at 12:04

## 2023-04-20 RX ADMIN — POLYETHYLENE GLYCOL 3350 17 G: 17 POWDER, FOR SOLUTION ORAL at 08:04

## 2023-04-20 RX ADMIN — HYDROMORPHONE HYDROCHLORIDE 0.5 MG: 1 INJECTION, SOLUTION INTRAMUSCULAR; INTRAVENOUS; SUBCUTANEOUS at 03:04

## 2023-04-20 RX ADMIN — METHOCARBAMOL 1000 MG: 500 TABLET ORAL at 08:04

## 2023-04-20 RX ADMIN — PANTOPRAZOLE SODIUM 40 MG: 40 TABLET, DELAYED RELEASE ORAL at 08:04

## 2023-04-20 RX ADMIN — HYDROMORPHONE HYDROCHLORIDE 0.5 MG: 1 INJECTION, SOLUTION INTRAMUSCULAR; INTRAVENOUS; SUBCUTANEOUS at 07:04

## 2023-04-20 RX ADMIN — SENNOSIDES AND DOCUSATE SODIUM 1 TABLET: 50; 8.6 TABLET ORAL at 08:04

## 2023-04-20 RX ADMIN — GABAPENTIN 300 MG: 300 CAPSULE ORAL at 02:04

## 2023-04-20 RX ADMIN — HYDROMORPHONE HYDROCHLORIDE 0.5 MG: 1 INJECTION, SOLUTION INTRAMUSCULAR; INTRAVENOUS; SUBCUTANEOUS at 06:04

## 2023-04-20 RX ADMIN — ACETAMINOPHEN 1000 MG: 500 TABLET, FILM COATED ORAL at 06:04

## 2023-04-20 RX ADMIN — OXYCODONE 5 MG: 5 TABLET ORAL at 06:04

## 2023-04-20 RX ADMIN — NICOTINE 1 PATCH: 14 PATCH TRANSDERMAL at 08:04

## 2023-04-20 RX ADMIN — INSULIN ASPART 3 UNITS: 100 INJECTION, SOLUTION INTRAVENOUS; SUBCUTANEOUS at 08:04

## 2023-04-20 RX ADMIN — HYDROXYZINE HYDROCHLORIDE 25 MG: 25 TABLET ORAL at 08:04

## 2023-04-20 NOTE — PLAN OF CARE
Problem: Physical Therapy  Goal: Physical Therapy Goal  Description: Goals to be met by: 23     Patient will increase functional independence with mobility by performin. Supine to sit with Modified [place]  2. Rolling to Left and Right with Modified [place]  3. Sit to stand transfer with Modified [place] using RW   4. Bed to chair transfer with Modified [place] using Rolling Walker  5. Gait x50 feet with mod I using Rolling Walker  6. Wheelchair propulsion x250 feet with Modified [place] using bilateral upper extremities  7. Lower extremity exercise program 2 sets x15 reps per handout, with independence    Outcome: Ongoing, Progressing     Pt ambulated ~12 ft within room with CGA using RW and LLE NWB.

## 2023-04-20 NOTE — PT/OT/SLP EVAL
Physical Therapy Evaluation    Patient Name:  Audrey Natarajan   MRN:  [id number]    Recommendations:     Discharge Recommendations: home health PT (with family assistance)   Discharge Equipment Recommendations: bedside commode, bath bench   Barriers to discharge: None    Assessment:     Audrey Natarajan is a 52 y.o. female admitted with a medical diagnosis of Left midfoot ulcer, with necrosis of muscle.  She presents with the following impairments/functional limitations: weakness, impaired endurance, impaired sensation, impaired functional mobility, gait instability, impaired balance, decreased lower extremity function, pain, decreased ROM, impaired skin, edema, orthopedic precautions.    Rehab Prognosis: Good; patient would benefit from acute skilled PT services to address these deficits and reach maximum level of function.    Recent Surgery: Procedure(s) (LRB):  AMPUTATION, BELOW KNEE (Left) 1 Day Post-Op    Plan:     During this hospitalization, patient to be seen 6 x/week to address the identified rehab impairments via gait training, therapeutic activities, therapeutic exercises, wheelchair management/training and progress toward the following goals:    Plan of Care Expires:  05/04/23    Subjective     Chief Complaint: pain  Patient/Family Comments/goals: Pt agreeable to therapy.   Pain/Comfort:  Pain Rating 1: 8/10  Location - Side 1: Left  Location 1: leg  Pain Addressed 1: Pre-medicate for activity, Reposition, Distraction, Cessation of Activity      Living Environment:  Pt lives in a [facility] with no concerns at entry.   Prior to admission, patients level of function was mod I with ambulation.  Equipment at home: wheelchair, rollator (knee scooter).  Upon discharge, patient will have limited assistance from friend (Mr. Talavera) and sister.    Objective:     Communicated with nurse prior to session.  Patient found HOB elevated with peripheral IV, telemetry, PureWick upon PT entry to room.    General  Precautions: Standard, fall, diabetic  Orthopedic Precautions:LLE non weight bearing s/p L BKA  Braces: N/A  Respiratory Status: Room air    Exams:  Cognitive Exam:  Patient was able to follow multiple commands.   Gross Motor Coordination:  WFL  Postural Exam:  Patient presented with the following abnormalities:    -       No postural abnormalities identified  Sensation:    -       Intact light/touch BLE; Pt with h/o DM neuropathy to foot, reported tingling sensation to R foot.  Skin Integrity/Edema:      -       Skin integrity: LLE stump dressings intact/dry  -       Edema: Mild LLE  BLE ROM: RLE WNL, LLE hip flex and hip abd/add and knee flex/ext WFL  BLE Strength: RLE WNL, LLE WFL    Functional Mobility:  Bed Mobility:     Scooting: stand by assistance  Supine to Sit: stand by assistance  Transfers:     Sit to Stand: contact guard assistance with rolling walker  Gait: Pt ambulated ~12 ft within room with CGA using RW and LLE NWB.  Pt required min VC's for RW management and safety during ambulation.  Balance: Pt with fair dynamic standing balance.       AM-PAC 6 CLICK MOBILITY  Total Score:18       Treatment & Education:  Pt educated on acute skilled PT services and goals.  Pt encouraged LLE therex: QS, knee flex/ext, hip flex, and hip abd/add.  Pt able to perform LLE therex without any difficulty.  Pt to call for nursing assistance with OOB activities while in the hospital.  Pt verbalized good understanding.      Patient left sitting edge of bed with all lines intact, call button in reach, and Lj, OT present for self-care needs.      GOALS:   Multidisciplinary Problems       Physical Therapy Goals          Problem: Physical Therapy    Goal Priority Disciplines Outcome Goal Variances Interventions   Physical Therapy Goal     PT, PT/OT Ongoing, Progressing     Description: Goals to be met by: 23     Patient will increase functional independence with mobility by performin. Supine to sit with Modified  [place]  2. Rolling to Left and Right with Modified [place]  3. Sit to stand transfer with Modified [place] using RW   4. Bed to chair transfer with Modified [place] using Rolling Walker  5. Gait x50 feet with mod I using Rolling Walker  6. Wheelchair propulsion x250 feet with Modified [place] using bilateral upper extremities  7. Lower extremity exercise program 2 sets x15 reps per handout, with independence                         History:     Past Medical History:   Diagnosis Date    ADHD (attention deficit hyperactivity disorder)     Arthritis     Asthma     Bipolar 1 disorder     Cataract     Cigarette smoker     COPD (chronic obstructive pulmonary disease)     Coronary artery disease     A fib    Depression     bipolar manic depresson    Diabetes mellitus     Diabetic foot ulcers     Diabetic neuropathy     DVT of lower extremity, bilateral 07/2013    bilateral LE DVT. [place] filter placed.     Encounter for blood transfusion     History of blood clots 1. Left Leg=2003; 2.Bilateral Groin=Blood Clots= 5 or 6/ 2013 & 7/2013; 3. LLL of Lung=7/2013;  4. Lt. Lower Leg=7/2013.     Pt. had 1st Blood Clot after Naocghgxoebe=6396, & Last=2013. [place] Filter= Rt.Lateral Neck.    HTN (hypertension) 06/06/2013    Pt states that she does not have hypertension    Hypercholesteremia     Irregular heartbeat     Neuromuscular disorder     neuropathy feet    Obese     PE (pulmonary embolism) 07/2013    bilat LE DVT.     Restless leg syndrome        Past Surgical History:   Procedure Laterality Date    ABDOMINAL SURGERY  2010    gastric sleeve    BILATERAL OOPHORECTOMY Bilateral 1/12/2015    CHOLECYSTECTOMY      DEBRIDEMENT OF FOOT Bilateral 5/10/2022    Procedure: DEBRIDEMENT, FOOT;  Surgeon: Maira De Los Santos DPM;  Location: [facility] OR;  Service: Podiatry;  Laterality: Bilateral;    DEBRIDEMENT OF FOOT Left 2/28/2023    Procedure: DEBRIDEMENT, FOOT,biopsy;  Surgeon: Maira De Los Santos DPM;  Location:  "[facility] OR;  Service: Podiatry;  Laterality: Left;  request misonix, wound VAC, possible neoxx    Green' s filter Right 7/4/2012    Right Neck & Tunneled Down.    HERNIA REPAIR      "[place] of Hernias Repaires around th Belly Button.", pt. states    INCISION AND DRAINAGE FOOT Left 12/24/2022    Procedure: INCISION AND DRAINAGE, FOOT;  Surgeon: Fahad Razo DPM;  Location: [facility] OR;  Service: Podiatry;  Laterality: Left;    LAPAROSCOPIC CHOLECYSTECTOMY N/A 9/10/2020    Procedure: CHOLECYSTECTOMY, LAPAROSCOPIC;  Surgeon: Montrell Gutierrez MD;  Location: [facility];  Service: General;  Laterality: N/A;  RN PREOP 9/9----COVID Negative  9/9    OVARIAN CYST REMOVAL  3/13/2014    VT REMOVAL OF OVARY/TUBE(S)      SPLENECTOMY, TOTAL  July 2003    TONSILLECTOMY      as a child    TYMPANOSTOMY TUBE PLACEMENT  1976    VEIN SURGERY  2003    Lt leg       Time Tracking:     PT Received On: 04/20/23  PT Start Time: 1026     PT Stop Time: 1039  PT Total Time (min): 13 min     Billable Minutes: Evaluation 13 min co-eval with OT      04/20/2023  "

## 2023-04-20 NOTE — ASSESSMENT & PLAN NOTE
51 yo F with chronic wound of left leg now s/p L BKA 4/19    - diabetic diet  - multimodal pain regimen: tylenol, robaxin, gabapentin, lidocaine patch scheduled; PRN berta with PRN IV morphine for breakthrough pain not relieved by oral medications  - home meds   - PT, OT, mobilization  - SW consult, pending rehab placement  - DVT ppx

## 2023-04-20 NOTE — NURSING
[facility], [facility]  Nurses Note -- 4 Eyes      4/19/2023       Skin assessed on: Q Shift      [x] No Pressure Injuries Present    []Prevention Measures Documented    [] Yes LDA  for Pressure Injury Previously documented     [] Yes New Pressure Injury Discovered   [] LDA for New Pressure Injury Added      Attending RN:  Liliana Gupta RN     Second RN:  Bessy Garduno RN

## 2023-04-20 NOTE — PLAN OF CARE
8:30 AM  TN sent referrals to to [facility] through [facility]: [facility], [facility], [facility], [facility], [facility].  10:35  AM  Sharon with [facility] is here to see patient.  12:30 PM  Anushka with [facility] here.  12:50 PM  Rosalind with [facility] rehab here.  2:20 PM TN talked with patient and she chose, [facility] for her next level of care.

## 2023-04-20 NOTE — PROGRESS NOTES
[facility], [facility]  Nurses Note -- 4 Eyes      4/20/2023       Skin assessed on: Q Shift      [x] No Pressure Injuries Present    [x]Prevention Measures Documented    [] Yes LDA  for Pressure Injury Previously documented     [] Yes New Pressure Injury Discovered   [] LDA for New Pressure Injury Added      Attending RN:  Sachi Shi RN     Second RN:  Liliana GARCIA RN

## 2023-04-20 NOTE — PT/OT/SLP EVAL
"Occupational Therapy   Evaluation    Name: Audrey Natarajan  MRN: [id number]  Admitting Diagnosis: Left midfoot ulcer, with necrosis of muscle  Recent Surgery: Procedure(s) (LRB):  AMPUTATION, BELOW KNEE (Left) 1 Day Post-Op    Recommendations:     Discharge Recommendations: home health OT (w/ family assistance)  Discharge Equipment Recommendations:  bedside commode, bath bench  Barriers to discharge:  None    Assessment:     Audrey Natarajan is a 52 y.o. female with a medical diagnosis of Left midfoot ulcer, with necrosis of muscle.  Performance deficits affecting function: weakness, impaired endurance, impaired self care skills, impaired functional mobility, gait instability, impaired balance, decreased lower extremity function, decreased safety awareness, impaired coordination, orthopedic precautions.      Pt pleasant and willing to participate in tx session this date; pt was able to perform bed mobility w/ SBA to stand and ambulate short distances in room w/ CGA and use of RW. Pt performed seated bath at EOB w/ SBA and no concerns. Pt tolerated tx session well despite complaints of pain and will continue to benefit from skilled acute OT services to maximize functional capacity for safe performance w/ ADLs and functional mobility.     Rehab Prognosis: Good; patient would benefit from acute skilled OT services to address these deficits and reach maximum level of function.       Plan:     Patient to be seen 5 x/week, 6 x/week to address the above listed problems via self-care/home management, therapeutic activities, therapeutic exercises  Plan of Care Expires: 05/04/23  Plan of Care Reviewed with: patient    Subjective     Chief Complaint: "It just feels like the leg is still there."   Patient/Family Comments/goals: Agreeable to eval     Occupational Profile:  Living Environment: Pt was living alone in a FEM trailer but is in the process of moving into a [facility] w/ 0 OLLIE.   Previous level of function: Pt was mod I " "w/ wheelchair (in community) and rollator as needed. Pt was driving.   Roles and Routines: Not working; on disability.   Equipment Used at Home: wheelchair, rollator (knee scooter)  Assistance upon Discharge: Friend and sister (lives in [place])     Pain/Comfort:  Pain Rating 1: 8/10  Location - Side 1: Left  Location 1: leg  Pain Addressed 1: Pre-medicate for activity, Reposition, Distraction, Cessation of Activity    Patients cultural, spiritual, Evangelical conflicts given the current situation: no    Objective:     Communicated with: Nurse prior to session.  Patient found HOB elevated with peripheral IV, telemetry, PureWick upon OT entry to room.    General Precautions: Standard, fall, diabetic  Orthopedic Precautions: LLE non weight bearing (L BKA)  Braces: N/A  Respiratory Status: Room air    Occupational Performance:    Bed Mobility:    Patient completed Scooting/Bridging with stand by assistance  Patient completed Supine to Sit with stand by assistance    Functional Mobility/Transfers:  Patient completed Sit <> Stand Transfer with contact guard assistance  with  rolling walker   Functional Mobility: Pt was able to "hop" in room at functional distances w/ CGA and use of RW; no LOB occurred. Refer to PT eval for assessment.     Activities of Daily Living:  Grooming: modified independence for washing face while sitting at EOB, unsupported   Bathing: stand by assistance and set-up assist for bathing face, neck, trunk, BUEs, BLEs while sitting at EOB, unsupported   Upper Body Dressing: minimum assistance for donning gown over back   Lower Body Dressing: stand by assistance for donning R sock in long-sitting     Cognitive/Visual Perceptual:  Cognitive/Psychosocial Skills:     -       Oriented to: Person, Place, Time, and Situation   -       Follows Commands/attention:Follows multistep  commands  -       Communication: clear/fluent  -       Memory: No Deficits noted  -       Safety awareness/insight to disability: " "intact     Physical Exam:  Balance:    -       good sitting balance; fair + standing   Postural examination/scapula alignment:    -       Rounded shoulders  -       Forward head  Skin integrity: Visible skin intact and w/ LLE bandage in place; dressing to BUEs due to "gnat bites"  Edema:  Mild LLE  Sensation:    -       Intact  Upper Extremity Range of Motion:     -       Right Upper Extremity: WFL  -       Left Upper Extremity: WFL  Upper Extremity Strength:    -       Right Upper Extremity: WFL  -       Left Upper Extremity: WFL   Strength:    -       Right Upper Extremity: WFL  -       Left Upper Extremity: WFL    AMPAC 6 Click ADL:  AMPAC Total Score: 21    Treatment & Education:  -Initial eval complete.   -Pt educated on role of OT and POC.   -Pt educated on safe functional mobility.   -Encouraged importance of performing OOB activity w/ assist from staff.     Patient left sitting edge of bed with all lines intact, call button in reach, and nurse and PCT notified    GOALS:   Multidisciplinary Problems       Occupational Therapy Goals          Problem: Occupational Therapy    Goal Priority Disciplines Outcome Interventions   Occupational Therapy Goal     OT, PT/OT Ongoing, Progressing    Description: Goals to be met by: 5/4/23     Patient will increase functional independence with ADLs by performing:    LE Dressing with Modified [place] and use of AE as needed.   Grooming while standing at sink with Stand-by Assistance; Mod I for sitting up in W/C  Toileting from toilet with Stand-by Assistance for hygiene and clothing management.   Step transfer with Modified [place]  Toilet transfer to toilet with Modified [place].  Upper extremity exercise program x15 reps per handout, with independence.                         History:     Past Medical History:   Diagnosis Date    ADHD (attention deficit hyperactivity disorder)     Arthritis     Asthma     Bipolar 1 disorder     Cataract     Cigarette " "smoker     COPD (chronic obstructive pulmonary disease)     Coronary artery disease     A fib    Depression     bipolar manic depresson    Diabetes mellitus     Diabetic foot ulcers     Diabetic neuropathy     DVT of lower extremity, bilateral 07/2013    bilateral LE DVT. [place] filter placed.     Encounter for blood transfusion     History of blood clots 1. Left Leg=2003; 2.Bilateral Groin=Blood Clots= 5 or 6/ 2013 & 7/2013; 3. LLL of Lung=7/2013;  4. Lt. Lower Leg=7/2013.     Pt. had 1st Blood Clot after Bvagoqddsaao=0166, & Last=2013. [place] Filter= Rt.Lateral Neck.    HTN (hypertension) 06/06/2013    Pt states that she does not have hypertension    Hypercholesteremia     Irregular heartbeat     Neuromuscular disorder     neuropathy feet    Obese     PE (pulmonary embolism) 07/2013    bilat LE DVT.     Restless leg syndrome          Past Surgical History:   Procedure Laterality Date    ABDOMINAL SURGERY  2010    gastric sleeve    BILATERAL OOPHORECTOMY Bilateral 1/12/2015    CHOLECYSTECTOMY      DEBRIDEMENT OF FOOT Bilateral 5/10/2022    Procedure: DEBRIDEMENT, FOOT;  Surgeon: Maira De Los Santos DPM;  Location: [facility] OR;  Service: Podiatry;  Laterality: Bilateral;    DEBRIDEMENT OF FOOT Left 2/28/2023    Procedure: DEBRIDEMENT, FOOT,biopsy;  Surgeon: Maira De Los Santos DPM;  Location: [facility] OR;  Service: Podiatry;  Laterality: Left;  request misonix, wound VAC, possible neoxx    Green' s filter Right 7/4/2012    Right Neck & Tunneled Down.    HERNIA REPAIR      "[place] of Hernias Repaires around th Belly Button.", pt. states    INCISION AND DRAINAGE FOOT Left 12/24/2022    Procedure: INCISION AND DRAINAGE, FOOT;  Surgeon: Fahad Razo DPM;  Location: [facility] OR;  Service: Podiatry;  Laterality: Left;    LAPAROSCOPIC CHOLECYSTECTOMY N/A 9/10/2020    Procedure: CHOLECYSTECTOMY, LAPAROSCOPIC;  Surgeon: Montrell Gutierrez MD;  Location: [facility] OR;  Service: General;  Laterality: N/A;  RN PREOP 9/9----COVID Negative  9/9    " OVARIAN CYST REMOVAL  3/13/2014    AL REMOVAL OF OVARY/TUBE(S)      SPLENECTOMY, TOTAL  July 2003    TONSILLECTOMY      as a child    TYMPANOSTOMY TUBE PLACEMENT  1976    VEIN SURGERY  2003    Lt leg       Time Tracking:     OT Date of Treatment: 04/20/23  OT Start Time: 1026  OT Stop Time: 1043  OT Total Time (min): 17 min    Billable Minutes:Evaluation 17  Total Time 17 (co-maria victoria w/ PT)     4/20/2023

## 2023-04-20 NOTE — PLAN OF CARE
PCP:  Donaldo Pena [phone]     [id number] - [place] - [address] [facility] [address] [address] [facility] [address]  [address] [place] [address]  Phone: [phone] Fax: [phone]    [facility] [place] [address]  [address]   [place] [address]  Phone: [phone] Fax: [phone]   Arrive From: Home  Help:  Kesha Hong [phone]  Barrier:  LBKA on 4/19Awaiting medical clearance to next level of care  DC Plan A:  Rehab  Plan B:  Same     04/20/23 0915   Discharge Assessment   Assessment Type Discharge Planning Assessment   Source of Information patient   Reason For Admission necrosis of mid left foot muscle   People in Home alone   Do you expect to return to your current living situation? Yes   Do you have help at home or someone to help you manage your care at home? Yes   Who are your caregiver(s) and their phone number(s)? Kesha Hong [phone]   Prior to hospitilization cognitive status: Alert/Oriented   Current cognitive status: Alert/Oriented   Walking or Climbing Stairs ambulation difficulty, requires equipment   Mobility Management rollator, knee walker, w/c   Home Accessibility wheelchair accessible   Equipment Currently Used at Home wheelchair;rollator;other (see comments)  (knee walker)   Readmission within 30 days? No   Patient currently being followed by outpatient case management? No   Do you currently have service(s) that help you manage your care at home? No   Do you take prescription medications? Yes   Do you have prescription coverage? Yes   Coverage MEDICAID [place]   Do you have any problems affording any of your prescribed medications? No   Who is going to help you get home at discharge? Kesha Hong [phone]   How do you get to doctors appointments? health plan transportation   Are you on dialysis? No   Do you take coumadin? No   Discharge Plan A Rehab   Discharge Plan B Rehab;Home  Health;Home   Discharge Plan discussed with: Patient

## 2023-04-20 NOTE — SUBJECTIVE & OBJECTIVE
Interval History: MARSHALLEON. S/p left leg BKA, tolerated procedure well. Has moved leg and bent joint a little, but has not sat up due to pain. She feels sore but reports adequate pain control with current regimen. Taking IV prn for breakthrough    Medications:  Continuous Infusions:  Scheduled Meds:   acetaminophen  1,000 mg Oral Q8H    aspirin  81 mg Oral Daily    bumetanide  1 mg Oral Daily    cetirizine  10 mg Oral Daily    divalproex  500 mg Oral Daily    enoxaparin  40 mg Subcutaneous Daily    gabapentin  300 mg Oral TID    LIDOcaine  1 patch Transdermal Q24H    methocarbamoL  1,000 mg Oral TID    metoprolol tartrate  25 mg Oral BID    mupirocin   Nasal BID    nicotine  1 patch Transdermal Daily    pantoprazole  40 mg Oral Daily    polyethylene glycol  17 g Oral Daily    risperiDONE  3 mg Oral BID    senna-docusate 8.6-50 mg  1 tablet Oral Daily     PRN Meds:acetaminophen, albuterol-ipratropium, dextrose 50%, dextrose 50%, glucagon (human recombinant), glucose, glucose, hydrALAZINE, HYDROmorphone, hydrOXYzine, insulin aspart U-100, labetalol, melatonin, ondansetron, oxyCODONE, oxyCODONE, promethazine, sodium chloride 0.9%     Review of patient's allergies indicates:   Allergen Reactions    Morphine Other (See Comments)     Patient had a psychotic episode after taking Morphine  Agitation, hallucinations    Penicillins Anaphylaxis     Tolerated cephalosporins in the past    Januvia [sitagliptin] Hives    Carbamazepine Other (See Comments)     hyponatremia     Objective:     Vital Signs (Most Recent):  Temp: 98.5 °F (36.9 °C) (04/20/23 0721)  Pulse: 92 (04/20/23 0721)  Resp: 20 (04/20/23 0721)  BP: (!) 122/57 (04/20/23 0721)  SpO2: (!) 93 % (04/20/23 0827)   Vital Signs (24h Range):  Temp:  [98.1 °F (36.7 °C)-98.6 °F (37 °C)] 98.5 °F (36.9 °C)  Pulse:  [] 92  Resp:  [10-27] 20  SpO2:  [90 %-100 %] 93 %  BP: (111-145)/(56-72) 122/57     Weight: 100.5 kg (221 lb 9 oz)  Body mass index is 35.76  Thyroid US  Right - Volume - 4.4 ml   Left - Volume - 2.3 ml  Normal 50-97% is 1.57 to 2.84 mls for girls aged 6 years    Right volume is enlarged. Labs still pending. kg/m².    Intake/Output - Last 3 Shifts         04/18 0700  04/19 0659 04/19 0700  04/20 0659 04/20 0700 04/21 0659    P.O.  600     I.V. (mL/kg)  900 (9)     Total Intake(mL/kg)  1500 (14.9)     Urine (mL/kg/hr)  600 (0.2)     Blood  100     Total Output  700     Net  +800                    Physical Exam  Vitals and nursing note reviewed.   Constitutional:       Appearance: Normal appearance. She is obese.   HENT:      Head: Normocephalic and atraumatic.   Cardiovascular:      Rate and Rhythm: Normal rate and regular rhythm.   Pulmonary:      Effort: Pulmonary effort is normal. No respiratory distress.   Abdominal:      General: Abdomen is flat. There is no distension.      Palpations: Abdomen is soft. There is no mass.      Tenderness: There is no abdominal tenderness.      Hernia: No hernia is present.   Musculoskeletal:      Right lower leg: No edema.      Left lower leg: No edema.      Comments: Left leg BKA with ace bandage in place    Skin:     General: Skin is warm and dry.   Neurological:      General: No focal deficit present.      Mental Status: She is alert and oriented to person, place, and time.   Psychiatric:         Mood and Affect: Mood normal.         Behavior: Behavior normal.       Significant Labs:  I have reviewed all pertinent lab results within the past 24 hours.  CBC:   Recent Labs   Lab 04/20/23  0334   WBC 13.26*   RBC 3.76*   HGB 8.6*   HCT 27.6*   *   MCV 73*   MCH 22.9*   MCHC 31.2*     CMP:   Recent Labs   Lab 04/20/23  0334   *   CALCIUM 8.5*      K 4.0   CO2 31*      BUN 10   CREATININE 0.7       Significant Diagnostics:  I have reviewed all pertinent imaging results/findings within the past 24 hours.

## 2023-04-20 NOTE — PLAN OF CARE
Problem: Adult Inpatient Plan of Care  Goal: Plan of Care Review  Outcome: Ongoing, Progressing  Flowsheets (Taken 4/20/2023 0354)  Plan of Care Reviewed With: patient    Patient remains free from injury and falls this shift. Dressing to left leg, clean,dry, and intact. Pain controlled with PRN analgesics. Patient shifting weight in bed. Patient resting in intervals. Plan of care continued.

## 2023-04-20 NOTE — PROGRESS NOTES
[facility] Surg  General Surgery  Progress Note    Subjective:     History of Present Illness:  No notes on file    Post-Op Info:  Procedure(s) (LRB):  AMPUTATION, BELOW KNEE (Left)   1 Day Post-Op     Interval History: NAEON. S/p left leg BKA, tolerated procedure well. Has moved leg and bent joint a little, but has not sat up due to pain. She feels sore but reports adequate pain control with current regimen. Taking IV prn for breakthrough    Medications:  Continuous Infusions:  Scheduled Meds:   acetaminophen  1,000 mg Oral Q8H    aspirin  81 mg Oral Daily    bumetanide  1 mg Oral Daily    cetirizine  10 mg Oral Daily    divalproex  500 mg Oral Daily    enoxaparin  40 mg Subcutaneous Daily    gabapentin  300 mg Oral TID    LIDOcaine  1 patch Transdermal Q24H    methocarbamoL  1,000 mg Oral TID    metoprolol tartrate  25 mg Oral BID    mupirocin   Nasal BID    nicotine  1 patch Transdermal Daily    pantoprazole  40 mg Oral Daily    polyethylene glycol  17 g Oral Daily    risperiDONE  3 mg Oral BID    senna-docusate 8.6-50 mg  1 tablet Oral Daily     PRN Meds:acetaminophen, albuterol-ipratropium, dextrose 50%, dextrose 50%, glucagon (human recombinant), glucose, glucose, hydrALAZINE, HYDROmorphone, hydrOXYzine, insulin aspart U-100, labetalol, melatonin, ondansetron, oxyCODONE, oxyCODONE, promethazine, sodium chloride 0.9%     Review of patient's allergies indicates:   Allergen Reactions    Morphine Other (See Comments)     Patient had a psychotic episode after taking Morphine  Agitation, hallucinations    Penicillins Anaphylaxis     Tolerated cephalosporins in the past    Januvia [sitagliptin] Hives    Carbamazepine Other (See Comments)     hyponatremia     Objective:     Vital Signs (Most Recent):  Temp: 98.5 °F (36.9 °C) (04/20/23 0721)  Pulse: 92 (04/20/23 0721)  Resp: 20 (04/20/23 0721)  BP: (!) 122/57 (04/20/23 0721)  SpO2: (!) 93 % (04/20/23 0827)   Vital Signs (24h Range):  Temp:   [98.1 °F (36.7 °C)-98.6 °F (37 °C)] 98.5 °F (36.9 °C)  Pulse:  [] 92  Resp:  [10-27] 20  SpO2:  [90 %-100 %] 93 %  BP: (111-145)/(56-72) 122/57     Weight: 100.5 kg (221 lb 9 oz)  Body mass index is 35.76 kg/m².    Intake/Output - Last 3 Shifts         04/18 0700 04/19 0659 04/19 0700 04/20 0659 04/20 0700 04/21 0659    P.O.  600     I.V. (mL/kg)  900 (9)     Total Intake(mL/kg)  1500 (14.9)     Urine (mL/kg/hr)  600 (0.2)     Blood  100     Total Output  700     Net  +800                    Physical Exam  Vitals and nursing note reviewed.   Constitutional:       Appearance: Normal appearance. She is obese.   HENT:      Head: Normocephalic and atraumatic.   Cardiovascular:      Rate and Rhythm: Normal rate and regular rhythm.   Pulmonary:      Effort: Pulmonary effort is normal. No respiratory distress.   Abdominal:      General: Abdomen is flat. There is no distension.      Palpations: Abdomen is soft. There is no mass.      Tenderness: There is no abdominal tenderness.      Hernia: No hernia is present.   Musculoskeletal:      Right lower leg: No edema.      Left lower leg: No edema.      Comments: Left leg BKA with ace bandage in place    Skin:     General: Skin is warm and dry.   Neurological:      General: No focal deficit present.      Mental Status: She is alert and oriented to person, place, and time.   Psychiatric:         Mood and Affect: Mood normal.         Behavior: Behavior normal.       Significant Labs:  I have reviewed all pertinent lab results within the past 24 hours.  CBC:   Recent Labs   Lab 04/20/23  0334   WBC 13.26*   RBC 3.76*   HGB 8.6*   HCT 27.6*   *   MCV 73*   MCH 22.9*   MCHC 31.2*     CMP:   Recent Labs   Lab 04/20/23  0334   *   CALCIUM 8.5*      K 4.0   CO2 31*      BUN 10   CREATININE 0.7       Significant Diagnostics:  I have reviewed all pertinent imaging results/findings within the past 24 hours.    Assessment/Plan:     * Left midfoot ulcer,  with necrosis of muscle  51 yo F with chronic wound of left leg now s/p L BKA 4/19    - diabetic diet  - multimodal pain regimen: tylenol, robaxin, gabapentin, lidocaine patch scheduled; PRN berta with PRN IV dilaudid for breakthrough pain not relieved by oral medications  - home meds   - PT, OT, mobilization  - SW consult, pending rehab placement  - DVT ppx         Lindsay Ahumada MD  General Surgery  [facility] [place]

## 2023-04-20 NOTE — PLAN OF CARE
Problem: Occupational Therapy  Goal: Occupational Therapy Goal  Description: Goals to be met by: 5/4/23     Patient will increase functional independence with ADLs by performing:    LE Dressing with Modified Schley and use of AE as needed.   Grooming while standing at sink with Stand-by Assistance; Mod I for sitting up in W/C  Toileting from toilet with Stand-by Assistance for hygiene and clothing management.   Step transfer with Modified Sch[place]  Toilet transfer to toilet with Modified Schley.  Upper extremity exercise program x15 reps per handout, with independence.    Outcome: Ongoing, Progressing

## 2023-04-20 NOTE — PLAN OF CARE
Problem: Diabetes Comorbidity  Goal: Blood Glucose Level Within Targeted Range  Outcome: Ongoing, Progressing     Problem: Impaired Wound Healing  Goal: Optimal Wound Healing  Outcome: Ongoing, Progressing     Problem: Fall Injury Risk  Goal: Absence of Fall and Fall-Related Injury  Outcome: Ongoing, Progressing     Problem: Skin Injury Risk Increased  Goal: Skin Health and Integrity  Outcome: Ongoing, Progressing      Patient resting quietly in bed with family at bedside. PRN pain med effective stated 4/10. Bed in low position, call light with reach, will cont to monitor for any changes.

## 2023-04-21 LAB
ANION GAP SERPL CALC-SCNC: 10 MMOL/L (ref 8–16)
BASOPHILS # BLD AUTO: 0.08 K/UL (ref 0–0.2)
BASOPHILS NFR BLD: 0.5 % (ref 0–1.9)
BUN SERPL-MCNC: 6 MG/DL (ref 6–20)
CALCIUM SERPL-MCNC: 8.3 MG/DL (ref 8.7–10.5)
CHLORIDE SERPL-SCNC: 97 MMOL/L (ref 95–110)
CO2 SERPL-SCNC: 29 MMOL/L (ref 23–29)
CREAT SERPL-MCNC: 0.7 MG/DL (ref 0.5–1.4)
DIFFERENTIAL METHOD: ABNORMAL
EOSINOPHIL # BLD AUTO: 0.2 K/UL (ref 0–0.5)
EOSINOPHIL NFR BLD: 1.3 % (ref 0–8)
ERYTHROCYTE [DISTWIDTH] IN BLOOD BY AUTOMATED COUNT: 19.3 % (ref 11.5–14.5)
EST. GFR  (NO RACE VARIABLE): >60 ML/MIN/1.73 M^2
FINAL PATHOLOGIC DIAGNOSIS: NORMAL
GLUCOSE SERPL-MCNC: 296 MG/DL (ref 70–110)
GROSS: NORMAL
HCT VFR BLD AUTO: 26.4 % (ref 37–48.5)
HGB BLD-MCNC: 8.4 G/DL (ref 12–16)
IMM GRANULOCYTES # BLD AUTO: 0.19 K/UL (ref 0–0.04)
IMM GRANULOCYTES NFR BLD AUTO: 1.2 % (ref 0–0.5)
LYMPHOCYTES # BLD AUTO: 4.7 K/UL (ref 1–4.8)
LYMPHOCYTES NFR BLD: 29.6 % (ref 18–48)
Lab: NORMAL
MCH RBC QN AUTO: 23 PG (ref 27–31)
MCHC RBC AUTO-ENTMCNC: 31.8 G/DL (ref 32–36)
MCV RBC AUTO: 72 FL (ref 82–98)
MONOCYTES # BLD AUTO: 2 K/UL (ref 0.3–1)
MONOCYTES NFR BLD: 12.4 % (ref 4–15)
NEUTROPHILS # BLD AUTO: 8.7 K/UL (ref 1.8–7.7)
NEUTROPHILS NFR BLD: 55 % (ref 38–73)
NRBC BLD-RTO: 0 /100 WBC
PLATELET # BLD AUTO: 611 K/UL (ref 150–450)
PMV BLD AUTO: 9.9 FL (ref 9.2–12.9)
POCT GLUCOSE: 245 MG/DL (ref 70–110)
POCT GLUCOSE: 263 MG/DL (ref 70–110)
POCT GLUCOSE: 329 MG/DL (ref 70–110)
POCT GLUCOSE: 361 MG/DL (ref 70–110)
POTASSIUM SERPL-SCNC: 4.1 MMOL/L (ref 3.5–5.1)
RBC # BLD AUTO: 3.66 M/UL (ref 4–5.4)
SODIUM SERPL-SCNC: 136 MMOL/L (ref 136–145)
WBC # BLD AUTO: 15.76 K/UL (ref 3.9–12.7)

## 2023-04-21 PROCEDURE — 94761 N-INVAS EAR/PLS OXIMETRY MLT: CPT

## 2023-04-21 PROCEDURE — S4991 NICOTINE PATCH NONLEGEND: HCPCS | Performed by: STUDENT IN AN ORGANIZED HEALTH CARE EDUCATION/TRAINING PROGRAM

## 2023-04-21 PROCEDURE — 80048 BASIC METABOLIC PNL TOTAL CA: CPT | Performed by: STUDENT IN AN ORGANIZED HEALTH CARE EDUCATION/TRAINING PROGRAM

## 2023-04-21 PROCEDURE — 63600175 PHARM REV CODE 636 W HCPCS: Performed by: STUDENT IN AN ORGANIZED HEALTH CARE EDUCATION/TRAINING PROGRAM

## 2023-04-21 PROCEDURE — 97110 THERAPEUTIC EXERCISES: CPT

## 2023-04-21 PROCEDURE — 36415 COLL VENOUS BLD VENIPUNCTURE: CPT | Performed by: STUDENT IN AN ORGANIZED HEALTH CARE EDUCATION/TRAINING PROGRAM

## 2023-04-21 PROCEDURE — 97116 GAIT TRAINING THERAPY: CPT

## 2023-04-21 PROCEDURE — 97530 THERAPEUTIC ACTIVITIES: CPT

## 2023-04-21 PROCEDURE — 63600175 PHARM REV CODE 636 W HCPCS: Performed by: SURGERY

## 2023-04-21 PROCEDURE — 85025 COMPLETE CBC W/AUTO DIFF WBC: CPT | Performed by: STUDENT IN AN ORGANIZED HEALTH CARE EDUCATION/TRAINING PROGRAM

## 2023-04-21 PROCEDURE — 11000001 HC ACUTE MED/SURG PRIVATE ROOM

## 2023-04-21 PROCEDURE — 25000003 PHARM REV CODE 250: Performed by: SURGERY

## 2023-04-21 PROCEDURE — 25000003 PHARM REV CODE 250: Performed by: STUDENT IN AN ORGANIZED HEALTH CARE EDUCATION/TRAINING PROGRAM

## 2023-04-21 RX ORDER — GABAPENTIN 300 MG/1
600 CAPSULE ORAL 3 TIMES DAILY
Status: DISCONTINUED | OUTPATIENT
Start: 2023-04-21 | End: 2023-04-23 | Stop reason: HOSPADM

## 2023-04-21 RX ORDER — INSULIN ASPART 100 [IU]/ML
1-10 INJECTION, SOLUTION INTRAVENOUS; SUBCUTANEOUS
Status: DISCONTINUED | OUTPATIENT
Start: 2023-04-21 | End: 2023-04-23 | Stop reason: HOSPADM

## 2023-04-21 RX ORDER — OXYCODONE HYDROCHLORIDE 5 MG/1
10 TABLET ORAL EVERY 4 HOURS PRN
Status: DISCONTINUED | OUTPATIENT
Start: 2023-04-21 | End: 2023-04-23 | Stop reason: HOSPADM

## 2023-04-21 RX ADMIN — NICOTINE 1 PATCH: 14 PATCH TRANSDERMAL at 08:04

## 2023-04-21 RX ADMIN — HYDROXYZINE HYDROCHLORIDE 25 MG: 25 TABLET ORAL at 08:04

## 2023-04-21 RX ADMIN — METOPROLOL TARTRATE 25 MG: 25 TABLET, FILM COATED ORAL at 08:04

## 2023-04-21 RX ADMIN — INSULIN ASPART 10 UNITS: 100 INJECTION, SOLUTION INTRAVENOUS; SUBCUTANEOUS at 04:04

## 2023-04-21 RX ADMIN — INSULIN ASPART 6 UNITS: 100 INJECTION, SOLUTION INTRAVENOUS; SUBCUTANEOUS at 12:04

## 2023-04-21 RX ADMIN — METHOCARBAMOL 1000 MG: 500 TABLET ORAL at 08:04

## 2023-04-21 RX ADMIN — LIDOCAINE 1 PATCH: 50 PATCH TOPICAL at 08:04

## 2023-04-21 RX ADMIN — RISPERIDONE 3 MG: 1 TABLET ORAL at 08:04

## 2023-04-21 RX ADMIN — Medication 6 MG: at 08:04

## 2023-04-21 RX ADMIN — ACETAMINOPHEN 1000 MG: 500 TABLET, FILM COATED ORAL at 02:04

## 2023-04-21 RX ADMIN — MUPIROCIN: 20 OINTMENT TOPICAL at 08:04

## 2023-04-21 RX ADMIN — ASPIRIN 81 MG 81 MG: 81 TABLET ORAL at 08:04

## 2023-04-21 RX ADMIN — DIVALPROEX SODIUM 500 MG: 250 TABLET, DELAYED RELEASE ORAL at 08:04

## 2023-04-21 RX ADMIN — GABAPENTIN 600 MG: 300 CAPSULE ORAL at 08:04

## 2023-04-21 RX ADMIN — BUMETANIDE 1 MG: 1 TABLET ORAL at 08:04

## 2023-04-21 RX ADMIN — METHOCARBAMOL 1000 MG: 500 TABLET ORAL at 02:04

## 2023-04-21 RX ADMIN — ACETAMINOPHEN 1000 MG: 500 TABLET, FILM COATED ORAL at 05:04

## 2023-04-21 RX ADMIN — CETIRIZINE HYDROCHLORIDE 10 MG: 10 TABLET, FILM COATED ORAL at 08:04

## 2023-04-21 RX ADMIN — OXYCODONE HYDROCHLORIDE 5 MG: 5 TABLET ORAL at 08:04

## 2023-04-21 RX ADMIN — OXYCODONE 10 MG: 5 TABLET ORAL at 01:04

## 2023-04-21 RX ADMIN — PANTOPRAZOLE SODIUM 40 MG: 40 TABLET, DELAYED RELEASE ORAL at 08:04

## 2023-04-21 RX ADMIN — GABAPENTIN 600 MG: 300 CAPSULE ORAL at 02:04

## 2023-04-21 RX ADMIN — INSULIN ASPART 4 UNITS: 100 INJECTION, SOLUTION INTRAVENOUS; SUBCUTANEOUS at 07:04

## 2023-04-21 RX ADMIN — OXYCODONE 10 MG: 5 TABLET ORAL at 10:04

## 2023-04-21 RX ADMIN — HYDROMORPHONE HYDROCHLORIDE 0.5 MG: 1 INJECTION, SOLUTION INTRAMUSCULAR; INTRAVENOUS; SUBCUTANEOUS at 01:04

## 2023-04-21 RX ADMIN — OXYCODONE HYDROCHLORIDE 5 MG: 5 TABLET ORAL at 04:04

## 2023-04-21 RX ADMIN — ENOXAPARIN SODIUM 40 MG: 40 INJECTION SUBCUTANEOUS at 04:04

## 2023-04-21 RX ADMIN — ACETAMINOPHEN 1000 MG: 500 TABLET, FILM COATED ORAL at 10:04

## 2023-04-21 RX ADMIN — INSULIN ASPART 4 UNITS: 100 INJECTION, SOLUTION INTRAVENOUS; SUBCUTANEOUS at 08:04

## 2023-04-21 RX ADMIN — OXYCODONE 10 MG: 5 TABLET ORAL at 06:04

## 2023-04-21 RX ADMIN — SENNOSIDES AND DOCUSATE SODIUM 1 TABLET: 50; 8.6 TABLET ORAL at 08:04

## 2023-04-21 NOTE — PLAN OF CARE
Problem: Diabetes Comorbidity  Goal: Blood Glucose Level Within Targeted Range  Intervention: Monitor and Manage Glycemia  Flowsheets (Taken 4/21/2023 0315)  Glycemic Management:   blood glucose monitored   supplemental insulin given     Problem: Fall Injury Risk  Goal: Absence of Fall and Fall-Related Injury  Intervention: Identify and Manage Contributors  Flowsheets (Taken 4/21/2023 0315)  Self-Care Promotion: BADL personal objects within reach  Medication Review/Management:   medications reviewed   high-risk medications identified  Intervention: Promote Injury-Free Environment  Flowsheets (Taken 4/21/2023 0315)  Safety Promotion/Fall Prevention:   assistive device/personal item within reach   bed alarm set   Fall Risk signage in place   Fall Risk reviewed with patient/family   high risk medications identified   lighting adjusted   medications reviewed   muscle strengthening facilitated   nonskid shoes/socks when out of bed   side rails raised x 2

## 2023-04-21 NOTE — ASSESSMENT & PLAN NOTE
51 yo F with chronic wound of left leg now s/p L BKA 4/19    - diabetic diet  - multimodal pain regimen: tylenol, robaxin, gabapentin, lidocaine patch scheduled; PRN berta with PRN IV morphine for breakthrough pain not relieved by oral medications  - Sliding scale insulin - moderate scale  - home meds   - PT, OT, mobilization  - SW consult, pending rehab placement. Patient does not have support at home - previously living in trailer, plans to move into studio apartment alone following recovery. HH recommended by PT on basis of home support, however patient will not have support and thus cannot go home with HH PT/OT   - DVT ppx

## 2023-04-21 NOTE — SUBJECTIVE & OBJECTIVE
Interval History: SERGE. S/p left leg BKA 4/19/2023  Working with PT; pain with movement but she is trying to bend and flex her thigh    Hyperglycemic - bumped up sliding scale       Medications:  Continuous Infusions:  Scheduled Meds:   acetaminophen  1,000 mg Oral Q8H    aspirin  81 mg Oral Daily    bumetanide  1 mg Oral Daily    cetirizine  10 mg Oral Daily    divalproex  500 mg Oral Daily    enoxaparin  40 mg Subcutaneous Daily    gabapentin  600 mg Oral TID    LIDOcaine  1 patch Transdermal Q24H    methocarbamoL  1,000 mg Oral TID    metoprolol tartrate  25 mg Oral BID    mupirocin   Nasal BID    nicotine  1 patch Transdermal Daily    pantoprazole  40 mg Oral Daily    polyethylene glycol  17 g Oral Daily    risperiDONE  3 mg Oral BID    senna-docusate 8.6-50 mg  1 tablet Oral Daily     PRN Meds:acetaminophen, albuterol-ipratropium, dextrose 50%, dextrose 50%, glucagon (human recombinant), glucose, glucose, hydrALAZINE, hydrOXYzine, insulin aspart U-100, labetalol, melatonin, ondansetron, oxyCODONE, oxyCODONE, promethazine, sodium chloride 0.9%     Review of patient's allergies indicates:   Allergen Reactions    Morphine Other (See Comments)     Patient had a psychotic episode after taking Morphine  Agitation, hallucinations    Penicillins Anaphylaxis     Tolerated cephalosporins in the past    Januvia [sitagliptin] Hives    Carbamazepine Other (See Comments)     hyponatremia     Objective:     Vital Signs (Most Recent):  Temp: 99.7 °F (37.6 °C) (04/21/23 0728)  Pulse: 102 (04/21/23 0728)  Resp: 18 (04/21/23 0836)  BP: 135/61 (04/21/23 0728)  SpO2: 95 % (04/21/23 0745)   Vital Signs (24h Range):  Temp:  [98.7 °F (37.1 °C)-100 °F (37.8 °C)] 99.7 °F (37.6 °C)  Pulse:  [] 102  Resp:  [16-20] 18  SpO2:  [93 %-96 %] 95 %  BP: (120-137)/(58-61) 135/61     Weight: 100.5 kg (221 lb 9 oz)  Body mass index is 35.76 kg/m².    Intake/Output - Last 3 Shifts         04/19 0700 04/20 0659 04/20 0700 04/21 0659 04/21  0700  04/22 0659    P.O. 600 360 120    I.V. (mL/kg) 900 (9)      Total Intake(mL/kg) 1500 (14.9) 360 (3.6) 120 (1.2)    Urine (mL/kg/hr) 600 (0.2) 1400 (0.6)     Blood 100      Total Output 700 1400     Net +800 -1040 +120           Urine Occurrence  4 x             Physical Exam  Vitals and nursing note reviewed.   Constitutional:       Appearance: Normal appearance. She is obese.   HENT:      Head: Normocephalic and atraumatic.   Cardiovascular:      Rate and Rhythm: Normal rate and regular rhythm.   Pulmonary:      Effort: Pulmonary effort is normal. No respiratory distress.   Abdominal:      General: Abdomen is flat. There is no distension.      Palpations: Abdomen is soft. There is no mass.      Tenderness: There is no abdominal tenderness.      Hernia: No hernia is present.   Musculoskeletal:      Right lower leg: No edema.      Left lower leg: No edema.      Comments: Left leg BKA with ace bandage in place    Skin:     General: Skin is warm and dry.   Neurological:      General: No focal deficit present.      Mental Status: She is alert and oriented to person, place, and time.   Psychiatric:         Mood and Affect: Mood normal.         Behavior: Behavior normal.       Significant Labs:  I have reviewed all pertinent lab results within the past 24 hours.  CBC:   Recent Labs   Lab 04/21/23  0517   WBC 15.76*   RBC 3.66*   HGB 8.4*   HCT 26.4*   *   MCV 72*   MCH 23.0*   MCHC 31.8*       CMP:   Recent Labs   Lab 04/21/23  0517   *   CALCIUM 8.3*      K 4.1   CO2 29   CL 97   BUN 6   CREATININE 0.7         Significant Diagnostics:  I have reviewed all pertinent imaging results/findings within the past 24 hours.

## 2023-04-21 NOTE — NURSING
[facility] [place]urses Note -- 4 Eyes      4/20/2023       Skin assessed on: Q Shift      [x] No Pressure Injuries Present    []Prevention Measures Documented    [] Yes LDA  for Pressure Injury Previously documented     [] Yes New Pressure Injury Discovered   [] LDA for New Pressure Injury Added      Attending RN:  Manny Oleary RN     Second RN:  Juany winters

## 2023-04-21 NOTE — PT/OT/SLP PROGRESS
Occupational Therapy   Treatment    Name: Audrey Natarajan  MRN: [id number]  Admitting Diagnosis:  Left midfoot ulcer, with necrosis of muscle  2 Days Post-Op    Recommendations:     Discharge Recommendations: home health OT (w/ family assistance)  Discharge Equipment Recommendations:  bedside commode, bath bench  Barriers to discharge:  None    Assessment:     Audrey Natarajan is a 52 y.o. female with a medical diagnosis of Left midfoot ulcer, with necrosis of muscle.  Performance deficits affecting function are weakness, impaired endurance, impaired self care skills, impaired functional mobility, gait instability, impaired balance, decreased lower extremity function, decreased safety awareness, impaired coordination, orthopedic precautions.     Pt very pleasant and willing to participate in tx session this date; pt was able to perform seated BUE TE for 1 set x 15 reps w/ use of green theraband to increase UB strength and endurance for safe performance w/ ADLs and functional mobility. Pt verbalized understanding for the importance of performing skin checks to ensure good skin integrity of LLE 2* new placement of knee immobilizer. Pt tolerated tx session well and will continue to benefit from skilled acute OT services to maximize functional capacity for safe performance w/ ADLs and functional mobility.     Rehab Prognosis:  Good; patient would benefit from acute skilled OT services to address these deficits and reach maximum level of function.       Plan:     Patient to be seen 5 x/week, 6 x/week to address the above listed problems via self-care/home management, therapeutic activities, therapeutic exercises  Plan of Care Expires: 05/04/23  Plan of Care Reviewed with: patient    Subjective     Chief Complaint:  Mild pain to L residual limb  Patient/Family Comments/goals: Pt wanting to know if nursing will perform dressing change to L limb since having new knee immobilizer placed   Pain/Comfort:   Pt reported  "LLE pain "was getting better"     Objective:     Communicated with: Nurse, Juany, prior to session.  Patient found HOB elevated after just returning to bed w/ nursing with peripheral IV, telemetry, PureWick upon OT entry to room.    General Precautions: Standard, fall, diabetic    Orthopedic Precautions:LLE non weight bearing (L BKA)  Braces: N/A  Respiratory Status: Room air     Occupational Performance:     Bed Mobility:    Patient completed Scooting/Bridging with stand by assistance  Patient completed Supine to Sit with stand by assistance  Patient completed Sit to Supine with stand by assistance     Functional Mobility/Transfers:  Patient completed Sit <> Stand Transfer x 1 trial from EOB with contact guard assistance  with  rolling walker   Functional Mobility: Pt was able to laterally hop to HOB w/ CGA and use of RW; no LOB occurred.     Activities of Daily Living:  Upper Body Dressing: minimum assistance for donning gown over back       [facility] 6 Click ADL:  21    Treatment & Education:  -Pt performed ADLs and functional mobility as noted above.   -Pt performed the following BUE TE for 1 set x 15 reps w/ use of green theraband to increase UB strength and endurance for safe performance w/ ADLs and functional mobility:    -tricep ext   -elbow flex/ext    -shoulder abd/add   -horizontal abd   -shoulder IR/ER  -Pt educated on importance of performing skin checks since ace wrap was removed by and new knee immobilizer was places. Pt verbalized understanding.  -Questions and concerns addressed within scope.      Patient left HOB elevated with all lines intact and call button in reach    GOALS:   Multidisciplinary Problems       Occupational Therapy Goals          Problem: Occupational Therapy    Goal Priority Disciplines Outcome Interventions   Occupational Therapy Goal     OT, PT/OT Ongoing, Progressing    Description: Goals to be met by: 5/4/23     Patient will increase functional independence with ADLs by " performing:    LE Dressing with Modified [place] and use of AE as needed.   Grooming while standing at sink with Stand-by Assistance; Mod I for sitting up in W/C  Toileting from toilet with Stand-by Assistance for hygiene and clothing management.   Step transfer with Modified [place]  Toilet transfer to toilet with Modified [place].  Upper extremity exercise program x15 reps per handout, with independence.                         Time Tracking:     OT Date of Treatment: 04/21/23  OT Start Time: 1415  OT Stop Time: 1439  OT Total Time (min): 24 min    Billable Minutes:Therapeutic Activity 12  Therapeutic Exercise 12  Total Time 24    OT/CHERRY: OT          4/21/2023

## 2023-04-21 NOTE — NURSING
please review x-ray results and I can call patient back to discuss.   Patient  sitting in recliner, LLE open to air, staples intact, no redness or drainage noted, medicated with PRN pain med. Knee immobilizer applied to LLE without difficulty, safety maintained, call light within reach, will cont to monitor.

## 2023-04-21 NOTE — PLAN OF CARE
Problem: Occupational Therapy  Goal: Occupational Therapy Goal  Description: Goals to be met by: 5/4/23     Patient will increase functional independence with ADLs by performing:    LE Dressing with Modified [place] and use of AE as needed.   Grooming while standing at sink with Stand-by Assistance; Mod I for sitting up in W/C  Toileting from toilet with Stand-by Assistance for hygiene and clothing management.   Step transfer with Modified P[place]  Toilet transfer to toilet with Modified Pender.  Upper extremity exercise program x15 reps per handout, with independence.    Outcome: Ongoing, Progressing

## 2023-04-21 NOTE — PT/OT/SLP PROGRESS
Physical Therapy Treatment    Patient Name:  Audrey Natarajan   MRN:  [id number]    Recommendations:     Discharge Recommendations: home health PT (with family assistance)  Discharge Equipment Recommendations: bedside commode, bath bench  Barriers to discharge: None    Assessment:     Audrey Natarajan is a 52 y.o. female admitted with a medical diagnosis of Left midfoot ulcer, with necrosis of muscle.  She presents with the following impairments/functional limitations: weakness, impaired endurance, impaired sensation, impaired functional mobility, gait instability, impaired balance, decreased lower extremity function, pain, decreased ROM, impaired skin, edema, orthopedic precautions.    Rehab Prognosis: Good; patient would benefit from acute skilled PT services to address these deficits and reach maximum level of function.    Recent Surgery: Procedure(s) (LRB):  AMPUTATION, BELOW KNEE (Left) 2 Days Post-Op    Plan:     During this hospitalization, patient to be seen 6 x/week to address the identified rehab impairments via gait training, therapeutic activities, therapeutic exercises, wheelchair management/training and progress toward the following goals:    Plan of Care Expires:  05/04/23    Subjective     Chief Complaint: pain  Patient/Family Comments/goals: Pt agreeable to therapy.   Pain/Comfort:  Pain Rating 1: 8/10  Location - Side 1: Left  Location 1: leg  Pain Addressed 1: Pre-medicate for activity, Reposition, Distraction, Cessation of Activity, Nurse notified      Objective:     Patient found HOB elevated with peripheral IV, telemetry upon PT entry to room.     General Precautions: Standard, fall, diabetic  Orthopedic Precautions: LLE non weight bearing  Braces: N/A  Respiratory Status: Room air     Functional Mobility:  Bed Mobility:     Scooting: supervision  Supine to Sit: supervision  Transfers:     Sit to Stand:  stand by assistance and contact guard assistance with rolling walker x3 trials   Bed  to Chair: stand by assistance and contact guard assistance with  rolling walker  using  Step Transfer  Gait: Pt ambulated ~6 ft, ~16 ft, and ~16 ft with CGA-SBA using RW and LLE NWB.  Gait limited by pain.    Balance: Pt with fair dynamic standing balance.       AM-PAC 6 CLICK MOBILITY  Turning over in bed (including adjusting bedclothes, sheets and blankets)?: 4  Sitting down on and standing up from a chair with arms (e.g., wheelchair, bedside commode, etc.): 4  Moving from lying on back to sitting on the side of the bed?: 4  Moving to and from a bed to a chair (including a wheelchair)?: 3  Need to walk in hospital room?: 3  Climbing 3-5 steps with a railing?: 1  Basic Mobility Total Score: 19       Treatment & Education:  LLE seated therex 2 sets x10 reps: knee flex/ext, hip flex, and hip abd/add  LLE supine therex: QS, encouraged pt to perform throughout the day    Pt educated on the importance of L knee extension.  Pt to continue calling for nursing assistance with bed<>chair/bedside commode transfers while in the hospital.  Pt verbalized good understanding.     Pt currently with no dressing to L stump.  Per nurse Harrington, MD wanted to leave surgical incision open and no dressings at this time.  PT and nurse Juany notified MD concerning any consideration for stump wrapping at this time, awaiting to hear back from MD.     Patient left up in chair reclined with all lines intact, call button in reach, and nurse Juany notified.  Tray table close by.    GOALS:   Multidisciplinary Problems       Physical Therapy Goals          Problem: Physical Therapy    Goal Priority Disciplines Outcome Goal Variances Interventions   Physical Therapy Goal     PT, PT/OT Ongoing, Progressing     Description: Goals to be met by: 23     Patient will increase functional independence with mobility by performin. Supine to sit with Modified [place]  2. Rolling to Left and Right with Modified [place]  3. Sit to stand transfer  with Modified E[place] using RW   4. Bed to chair transfer with Modified E[place] using Rolling Walker  5. Gait x50 feet with mod I using Rolling Walker  6. Wheelchair propulsion x250 feet with Modified Erm[place] using bilateral upper extremities  7. Lower extremity exercise program 2 sets x15 reps per handout, with independence                         Time Tracking:     PT Received On: 04/21/23  PT Start Time: 1134     PT Stop Time: 1158  PT Total Time (min): 24 min     Billable Minutes: Gait Training 12 min and Therapeutic Exercise 12 min    Treatment Type: Treatment              04/21/2023

## 2023-04-21 NOTE — NURSING
Kerlix and 4x4 dry gauze applied to left stump for comfort from staples rubbing against knee immobilizer. Incision edges well approximated, no drainage noted

## 2023-04-21 NOTE — PLAN OF CARE
Problem: Diabetes Comorbidity  Goal: Blood Glucose Level Within Targeted Range  4/21/2023 1345 by Juany Wilson LPN  Outcome: Ongoing, Progressing  4/21/2023 1344 by Juany Wilson LPN  Outcome: Ongoing, Progressing     Problem: Impaired Wound Healing  Goal: Optimal Wound Healing  4/21/2023 1345 by Juany Wilson LPN  Outcome: Ongoing, Progressing  4/21/2023 1344 by Juany Wilson LPN  Outcome: Ongoing, Progressing     Problem: Skin Injury Risk Increased  Goal: Skin Health and Integrity  Outcome: Ongoing, Progressing

## 2023-04-21 NOTE — PROGRESS NOTES
[facility] Surg  General Surgery  Progress Note    Subjective:     History of Present Illness:  No notes on file    Post-Op Info:  Procedure(s) (LRB):  AMPUTATION, BELOW KNEE (Left)   2 Days Post-Op     Interval History: SERGE. S/p left leg BKA 4/19/2023  Working with PT; pain with movement but she is trying to bend and flex her thigh    Hyperglycemic - bumped up sliding scale       Medications:  Continuous Infusions:  Scheduled Meds:   acetaminophen  1,000 mg Oral Q8H    aspirin  81 mg Oral Daily    bumetanide  1 mg Oral Daily    cetirizine  10 mg Oral Daily    divalproex  500 mg Oral Daily    enoxaparin  40 mg Subcutaneous Daily    gabapentin  600 mg Oral TID    LIDOcaine  1 patch Transdermal Q24H    methocarbamoL  1,000 mg Oral TID    metoprolol tartrate  25 mg Oral BID    mupirocin   Nasal BID    nicotine  1 patch Transdermal Daily    pantoprazole  40 mg Oral Daily    polyethylene glycol  17 g Oral Daily    risperiDONE  3 mg Oral BID    senna-docusate 8.6-50 mg  1 tablet Oral Daily     PRN Meds:acetaminophen, albuterol-ipratropium, dextrose 50%, dextrose 50%, glucagon (human recombinant), glucose, glucose, hydrALAZINE, hydrOXYzine, insulin aspart U-100, labetalol, melatonin, ondansetron, oxyCODONE, oxyCODONE, promethazine, sodium chloride 0.9%     Review of patient's allergies indicates:   Allergen Reactions    Morphine Other (See Comments)     Patient had a psychotic episode after taking Morphine  Agitation, hallucinations    Penicillins Anaphylaxis     Tolerated cephalosporins in the past    Januvia [sitagliptin] Hives    Carbamazepine Other (See Comments)     hyponatremia     Objective:     Vital Signs (Most Recent):  Temp: 99.7 °F (37.6 °C) (04/21/23 0728)  Pulse: 102 (04/21/23 0728)  Resp: 18 (04/21/23 0836)  BP: 135/61 (04/21/23 0728)  SpO2: 95 % (04/21/23 0745)   Vital Signs (24h Range):  Temp:  [98.7 °F (37.1 °C)-100 °F (37.8 °C)] 99.7 °F (37.6 °C)  Pulse:  [] 102  Resp:   [16-20] 18  SpO2:  [93 %-96 %] 95 %  BP: (120-137)/(58-61) 135/61     Weight: 100.5 kg (221 lb 9 oz)  Body mass index is 35.76 kg/m².    Intake/Output - Last 3 Shifts         04/19 0700 04/20 0659 04/20 0700 04/21 0659 04/21 0700 04/22 0659    P.O. 600 360 120    I.V. (mL/kg) 900 (9)      Total Intake(mL/kg) 1500 (14.9) 360 (3.6) 120 (1.2)    Urine (mL/kg/hr) 600 (0.2) 1400 (0.6)     Blood 100      Total Output 700 1400     Net +800 -1040 +120           Urine Occurrence  4 x             Physical Exam  Vitals and nursing note reviewed.   Constitutional:       Appearance: Normal appearance. She is obese.   HENT:      Head: Normocephalic and atraumatic.   Cardiovascular:      Rate and Rhythm: Normal rate and regular rhythm.   Pulmonary:      Effort: Pulmonary effort is normal. No respiratory distress.   Abdominal:      General: Abdomen is flat. There is no distension.      Palpations: Abdomen is soft. There is no mass.      Tenderness: There is no abdominal tenderness.      Hernia: No hernia is present.   Musculoskeletal:      Right lower leg: No edema.      Left lower leg: No edema.      Comments: Left leg BKA with ace bandage in place    Skin:     General: Skin is warm and dry.   Neurological:      General: No focal deficit present.      Mental Status: She is alert and oriented to person, place, and time.   Psychiatric:         Mood and Affect: Mood normal.         Behavior: Behavior normal.       Significant Labs:  I have reviewed all pertinent lab results within the past 24 hours.  CBC:   Recent Labs   Lab 04/21/23  0517   WBC 15.76*   RBC 3.66*   HGB 8.4*   HCT 26.4*   *   MCV 72*   MCH 23.0*   MCHC 31.8*       CMP:   Recent Labs   Lab 04/21/23 0517   *   CALCIUM 8.3*      K 4.1   CO2 29   CL 97   BUN 6   CREATININE 0.7         Significant Diagnostics:  I have reviewed all pertinent imaging results/findings within the past 24 hours.    Assessment/Plan:     * Left midfoot ulcer, with  necrosis of muscle  53 yo F with chronic wound of left leg now s/p L BKA 4/19    - diabetic diet  - multimodal pain regimen: tylenol, robaxin, gabapentin, lidocaine patch scheduled; PRN berta with PRN IV morphine for breakthrough pain not relieved by oral medications  - Sliding scale insulin - moderate scale  - home meds   - PT, OT, mobilization  - SW consult, pending rehab placement. Patient does not have support at home - previously living in trailer, plans to move into studio apartment alone following recovery. HH recommended by PT on basis of home support, however patient will not have support and thus cannot go home with HH PT/OT   - DVT ppx         Lindsay Ahumada MD  General Surgery  [facility] - [place], [place] - Med Surg

## 2023-04-22 LAB
ANION GAP SERPL CALC-SCNC: 9 MMOL/L (ref 8–16)
BASOPHILS # BLD AUTO: 0.09 K/UL (ref 0–0.2)
BASOPHILS NFR BLD: 0.6 % (ref 0–1.9)
BUN SERPL-MCNC: 9 MG/DL (ref 6–20)
CALCIUM SERPL-MCNC: 8.9 MG/DL (ref 8.7–10.5)
CHLORIDE SERPL-SCNC: 96 MMOL/L (ref 95–110)
CO2 SERPL-SCNC: 30 MMOL/L (ref 23–29)
CREAT SERPL-MCNC: 0.7 MG/DL (ref 0.5–1.4)
DIFFERENTIAL METHOD: ABNORMAL
EOSINOPHIL # BLD AUTO: 0.5 K/UL (ref 0–0.5)
EOSINOPHIL NFR BLD: 3.3 % (ref 0–8)
ERYTHROCYTE [DISTWIDTH] IN BLOOD BY AUTOMATED COUNT: 19.7 % (ref 11.5–14.5)
EST. GFR  (NO RACE VARIABLE): >60 ML/MIN/1.73 M^2
GLUCOSE SERPL-MCNC: 294 MG/DL (ref 70–110)
HCT VFR BLD AUTO: 26.4 % (ref 37–48.5)
HGB BLD-MCNC: 8 G/DL (ref 12–16)
IMM GRANULOCYTES # BLD AUTO: 0.17 K/UL (ref 0–0.04)
IMM GRANULOCYTES NFR BLD AUTO: 1 % (ref 0–0.5)
LYMPHOCYTES # BLD AUTO: 5 K/UL (ref 1–4.8)
LYMPHOCYTES NFR BLD: 31 % (ref 18–48)
MCH RBC QN AUTO: 22.2 PG (ref 27–31)
MCHC RBC AUTO-ENTMCNC: 30.3 G/DL (ref 32–36)
MCV RBC AUTO: 73 FL (ref 82–98)
MONOCYTES # BLD AUTO: 1.9 K/UL (ref 0.3–1)
MONOCYTES NFR BLD: 11.8 % (ref 4–15)
NEUTROPHILS # BLD AUTO: 8.5 K/UL (ref 1.8–7.7)
NEUTROPHILS NFR BLD: 52.3 % (ref 38–73)
NRBC BLD-RTO: 0 /100 WBC
PLATELET # BLD AUTO: 622 K/UL (ref 150–450)
PMV BLD AUTO: 10.1 FL (ref 9.2–12.9)
POCT GLUCOSE: 277 MG/DL (ref 70–110)
POCT GLUCOSE: 282 MG/DL (ref 70–110)
POCT GLUCOSE: 332 MG/DL (ref 70–110)
POCT GLUCOSE: 355 MG/DL (ref 70–110)
POCT GLUCOSE: 358 MG/DL (ref 70–110)
POTASSIUM SERPL-SCNC: 4.2 MMOL/L (ref 3.5–5.1)
RBC # BLD AUTO: 3.61 M/UL (ref 4–5.4)
SODIUM SERPL-SCNC: 135 MMOL/L (ref 136–145)
WBC # BLD AUTO: 16.21 K/UL (ref 3.9–12.7)

## 2023-04-22 PROCEDURE — 25000003 PHARM REV CODE 250: Performed by: SURGERY

## 2023-04-22 PROCEDURE — 97530 THERAPEUTIC ACTIVITIES: CPT | Mod: CQ

## 2023-04-22 PROCEDURE — 63600175 PHARM REV CODE 636 W HCPCS: Performed by: STUDENT IN AN ORGANIZED HEALTH CARE EDUCATION/TRAINING PROGRAM

## 2023-04-22 PROCEDURE — S4991 NICOTINE PATCH NONLEGEND: HCPCS | Performed by: STUDENT IN AN ORGANIZED HEALTH CARE EDUCATION/TRAINING PROGRAM

## 2023-04-22 PROCEDURE — 99024 POSTOP FOLLOW-UP VISIT: CPT | Mod: ,,, | Performed by: SURGERY

## 2023-04-22 PROCEDURE — 85025 COMPLETE CBC W/AUTO DIFF WBC: CPT | Performed by: STUDENT IN AN ORGANIZED HEALTH CARE EDUCATION/TRAINING PROGRAM

## 2023-04-22 PROCEDURE — 36415 COLL VENOUS BLD VENIPUNCTURE: CPT | Performed by: STUDENT IN AN ORGANIZED HEALTH CARE EDUCATION/TRAINING PROGRAM

## 2023-04-22 PROCEDURE — 97542 WHEELCHAIR MNGMENT TRAINING: CPT | Mod: CQ

## 2023-04-22 PROCEDURE — 80048 BASIC METABOLIC PNL TOTAL CA: CPT | Performed by: STUDENT IN AN ORGANIZED HEALTH CARE EDUCATION/TRAINING PROGRAM

## 2023-04-22 PROCEDURE — 99024 PR POST-OP FOLLOW-UP VISIT: ICD-10-PCS | Mod: ,,, | Performed by: SURGERY

## 2023-04-22 PROCEDURE — 11000001 HC ACUTE MED/SURG PRIVATE ROOM

## 2023-04-22 PROCEDURE — 25000003 PHARM REV CODE 250: Performed by: STUDENT IN AN ORGANIZED HEALTH CARE EDUCATION/TRAINING PROGRAM

## 2023-04-22 RX ADMIN — CETIRIZINE HYDROCHLORIDE 10 MG: 10 TABLET, FILM COATED ORAL at 08:04

## 2023-04-22 RX ADMIN — ACETAMINOPHEN 1000 MG: 500 TABLET, FILM COATED ORAL at 05:04

## 2023-04-22 RX ADMIN — METOPROLOL TARTRATE 25 MG: 25 TABLET, FILM COATED ORAL at 08:04

## 2023-04-22 RX ADMIN — GABAPENTIN 600 MG: 300 CAPSULE ORAL at 02:04

## 2023-04-22 RX ADMIN — OXYCODONE 10 MG: 5 TABLET ORAL at 09:04

## 2023-04-22 RX ADMIN — HYDROXYZINE HYDROCHLORIDE 25 MG: 25 TABLET ORAL at 10:04

## 2023-04-22 RX ADMIN — ACETAMINOPHEN 1000 MG: 500 TABLET, FILM COATED ORAL at 08:04

## 2023-04-22 RX ADMIN — ACETAMINOPHEN 1000 MG: 500 TABLET, FILM COATED ORAL at 10:04

## 2023-04-22 RX ADMIN — METHOCARBAMOL 1000 MG: 500 TABLET ORAL at 08:04

## 2023-04-22 RX ADMIN — PANTOPRAZOLE SODIUM 40 MG: 40 TABLET, DELAYED RELEASE ORAL at 08:04

## 2023-04-22 RX ADMIN — GABAPENTIN 600 MG: 300 CAPSULE ORAL at 08:04

## 2023-04-22 RX ADMIN — INSULIN ASPART 5 UNITS: 100 INJECTION, SOLUTION INTRAVENOUS; SUBCUTANEOUS at 08:04

## 2023-04-22 RX ADMIN — METHOCARBAMOL 1000 MG: 500 TABLET ORAL at 02:04

## 2023-04-22 RX ADMIN — INSULIN ASPART 6 UNITS: 100 INJECTION, SOLUTION INTRAVENOUS; SUBCUTANEOUS at 04:04

## 2023-04-22 RX ADMIN — OXYCODONE 10 MG: 5 TABLET ORAL at 11:04

## 2023-04-22 RX ADMIN — LIDOCAINE 1 PATCH: 50 PATCH TOPICAL at 08:04

## 2023-04-22 RX ADMIN — ACETAMINOPHEN 1000 MG: 500 TABLET, FILM COATED ORAL at 02:04

## 2023-04-22 RX ADMIN — RISPERIDONE 3 MG: 1 TABLET ORAL at 08:04

## 2023-04-22 RX ADMIN — DIVALPROEX SODIUM 500 MG: 250 TABLET, DELAYED RELEASE ORAL at 08:04

## 2023-04-22 RX ADMIN — INSULIN ASPART 10 UNITS: 100 INJECTION, SOLUTION INTRAVENOUS; SUBCUTANEOUS at 08:04

## 2023-04-22 RX ADMIN — Medication 6 MG: at 10:04

## 2023-04-22 RX ADMIN — OXYCODONE 10 MG: 5 TABLET ORAL at 02:04

## 2023-04-22 RX ADMIN — OXYCODONE 10 MG: 5 TABLET ORAL at 07:04

## 2023-04-22 RX ADMIN — MUPIROCIN: 20 OINTMENT TOPICAL at 08:04

## 2023-04-22 RX ADMIN — ENOXAPARIN SODIUM 40 MG: 40 INJECTION SUBCUTANEOUS at 04:04

## 2023-04-22 RX ADMIN — NICOTINE 1 PATCH: 14 PATCH TRANSDERMAL at 08:04

## 2023-04-22 RX ADMIN — BUMETANIDE 1 MG: 1 TABLET ORAL at 08:04

## 2023-04-22 RX ADMIN — OXYCODONE 10 MG: 5 TABLET ORAL at 04:04

## 2023-04-22 RX ADMIN — INSULIN ASPART 8 UNITS: 100 INJECTION, SOLUTION INTRAVENOUS; SUBCUTANEOUS at 11:04

## 2023-04-22 RX ADMIN — ASPIRIN 81 MG 81 MG: 81 TABLET ORAL at 08:04

## 2023-04-22 NOTE — NURSING
[facility] [place]  Nurses Note -- 4 Eyes      4/22/2023       Skin assessed on: Q Shift      [x] No Pressure Injuries Present    [x]Prevention Measures Documented    [] Yes LDA  for Pressure Injury Previously documented     [] Yes New Pressure Injury Discovered   [] LDA for New Pressure Injury Added      Attending RN:  Zack Cuellar RN     Second RN:  Rosemary Kaiser RN

## 2023-04-22 NOTE — SUBJECTIVE & OBJECTIVE
Interval History:  Status post left BKA for 4/19/2023  Doing okay from the standpoint.    Awaiting transfer to rehab facility.    Placement hopefully to be done in the next couple of days.        Medications:  Continuous Infusions:  Scheduled Meds:   acetaminophen  1,000 mg Oral Q8H    aspirin  81 mg Oral Daily    bumetanide  1 mg Oral Daily    cetirizine  10 mg Oral Daily    divalproex  500 mg Oral Daily    enoxaparin  40 mg Subcutaneous Daily    gabapentin  600 mg Oral TID    LIDOcaine  1 patch Transdermal Q24H    methocarbamoL  1,000 mg Oral TID    metoprolol tartrate  25 mg Oral BID    mupirocin   Nasal BID    nicotine  1 patch Transdermal Daily    pantoprazole  40 mg Oral Daily    polyethylene glycol  17 g Oral Daily    risperiDONE  3 mg Oral BID    senna-docusate 8.6-50 mg  1 tablet Oral Daily     PRN Meds:acetaminophen, albuterol-ipratropium, dextrose 50%, dextrose 50%, glucagon (human recombinant), glucose, glucose, hydrALAZINE, hydrOXYzine, insulin aspart U-100, labetalol, melatonin, ondansetron, oxyCODONE, oxyCODONE, promethazine, sodium chloride 0.9%     Review of patient's allergies indicates:   Allergen Reactions    Morphine Other (See Comments)     Patient had a psychotic episode after taking Morphine  Agitation, hallucinations    Penicillins Anaphylaxis     Tolerated cephalosporins in the past    Januvia [sitagliptin] Hives    Carbamazepine Other (See Comments)     hyponatremia     Objective:     Vital Signs (Most Recent):  Temp: 99.4 °F (37.4 °C) (04/22/23 1113)  Pulse: 75 (04/22/23 1113)  Resp: 19 (04/22/23 1134)  BP: (!) 113/54 (04/22/23 1113)  SpO2: 96 % (04/22/23 1113)   Vital Signs (24h Range):  Temp:  [98.3 °F (36.8 °C)-99.4 °F (37.4 °C)] 99.4 °F (37.4 °C)  Pulse:  [75-97] 75  Resp:  [15-19] 19  SpO2:  [92 %-98 %] 96 %  BP: (106-135)/(52-75) 113/54     Weight: 100.5 kg (221 lb 9 oz)  Body mass index is 35.76 kg/m².    Intake/Output - Last 3 Shifts         04/20 0700  04/21 0659 04/21  0700  04/22 0659 04/22 0700  04/23 0659    P.O. 360 1680 240    I.V. (mL/kg)       Total Intake(mL/kg) 360 (3.6) 1680 (16.7) 240 (2.4)    Urine (mL/kg/hr) 1400 (0.6) 1750 (0.7) 700 (1.2)    Stool  0     Blood       Total Output 1400 1750 700    Net -1040 -70 -460           Urine Occurrence 4 x 2 x     Stool Occurrence  2 x             Physical Exam  Vitals and nursing note reviewed.   Constitutional:       Appearance: She is well-developed.   HENT:      Head: Normocephalic and atraumatic.   Cardiovascular:      Rate and Rhythm: Normal rate.      Heart sounds: Normal heart sounds.   Pulmonary:      Effort: Pulmonary effort is normal.   Abdominal:      General: Bowel sounds are normal. There is no distension.      Palpations: Abdomen is soft.      Tenderness: There is no abdominal tenderness.   Musculoskeletal:         General: Normal range of motion.      Cervical back: Normal range of motion.   Skin:     General: Skin is warm and dry.      Capillary Refill: Capillary refill takes less than 2 seconds.             Comments: Stump healing well with no sign of infection   Neurological:      Mental Status: She is alert and oriented to person, place, and time.   Psychiatric:         Behavior: Behavior normal.       Significant Labs:  I have reviewed all pertinent lab results within the past 24 hours.  CBC:   Recent Labs   Lab 04/22/23 0415   WBC 16.21*   RBC 3.61*   HGB 8.0*   HCT 26.4*   *   MCV 73*   MCH 22.2*   MCHC 30.3*     CMP:   Recent Labs   Lab 04/22/23 0415   *   CALCIUM 8.9   *   K 4.2   CO2 30*   CL 96   BUN 9   CREATININE 0.7       Significant Diagnostics:  I have reviewed all pertinent imaging results/findings within the past 24 hours.

## 2023-04-22 NOTE — PT/OT/SLP PROGRESS
Physical Therapy Treatment    Patient Name:  Audrey Natarajan   MRN:  [id number]    Recommendations:     Discharge Recommendations: home health PT (with family assistance)  Discharge Equipment Recommendations: bedside commode, bath bench  Barriers to discharge: None    Assessment:     Audrey Natarajan is a 52 y.o. female admitted with a medical diagnosis of Left midfoot ulcer, with necrosis of muscle.  She presents with the following impairments/functional limitations: weakness, impaired endurance, impaired functional mobility, gait instability, impaired balance, decreased coordination, decreased lower extremity function, decreased safety awareness, pain, orthopedic precautions, impaired skin.    Rehab Prognosis: Good; patient would benefit from acute skilled PT services to address these deficits and reach maximum level of function.    Recent Surgery: Procedure(s) (LRB):  AMPUTATION, BELOW KNEE (Left) 3 Days Post-Op    Plan:     During this hospitalization, patient to be seen 6 x/week to address the identified rehab impairments via gait training, therapeutic activities, therapeutic exercises, wheelchair management/training and progress toward the following goals:    Plan of Care Expires:  05/04/23    Subjective     Chief Complaint: pain to her Left leg, required pain medication prior treatment.  Patient/Family Comments/goals: Pt agreed to participate.  Pain/Comfort:  Pain Rating 1:  (pt didn't rate)  Location - Side 1: Left  Location 1: leg  Pain Addressed 1: Pre-medicate for activity, Reposition, Distraction, Cessation of Activity, Nurse notified      Objective:     Communicated with nurse Mcmahan prior to session.  Patient found HOB elevated with telemetry, peripheral IV, knee immobilizer upon PT entry to room.     General Precautions: Standard, fall, diabetic  Orthopedic Precautions: LLE non weight bearing (L BKA)  Braces: N/A  Respiratory Status: Room air     Functional Mobility:  Bed Mobility:     Supine  to Sit: supervision  Transfers: Gait belt don prior OOB activity     Sit to Stand: from EOB, BSC, Wheelchair with stand by assistance with rolling walker  Bed <> BSC: stand by assistance with rolling walker  using Step Transfer  Bed to Wheelchair: stand by assistance with no AD using  Squat Pivot  Wheelchair to BS Chair: stand by assistance with rolling walker using Step Transfer  Gait: Pt hopped 3 trials x ~4-5 hops using RW with SBA LLE NWB; v/t cues for proper hand placement and .   Wheelchair Propulsion: Pt propelled Standard wheelchair x ~500 feet on Level tile with  Bilateral upper extremity with Supervision or Set-up Assistance.   Balance: Good Sitting; Fair Standing      AM-PAC 6 CLICK MOBILITY  Turning over in bed (including adjusting bedclothes, sheets and blankets)?: 4  Sitting down on and standing up from a chair with arms (e.g., wheelchair, bedside commode, etc.): 4  Moving from lying on back to sitting on the side of the bed?: 4  Moving to and from a bed to a chair (including a wheelchair)?: 3  Need to walk in hospital room?: 3  Climbing 3-5 steps with a railing?: 1  Basic Mobility Total Score: 19       Treatment & Education:  Educated pt on benefit of OOB activity and encouraged pt to perform BLE ex throughout the day, pt verbalized understanding.   LLE ex in reclined position 3-5 sec hold 10 reps QS, GS    Patient left up in chair in reclined position on green cushion with BLE offloaded on pillow, LLE with Knee Immobilizer, tray table + lunch tray in front, all lines intact, call button in reach, and nurse notified.    GOALS:   Multidisciplinary Problems       Physical Therapy Goals          Problem: Physical Therapy    Goal Priority Disciplines Outcome Goal Variances Interventions   Physical Therapy Goal     PT, PT/OT Ongoing, Progressing     Description: Goals to be met by: 23     Patient will increase functional independence with mobility by performin. Supine to sit with  Modified [place]  2. Rolling to Left and Right with Modified [place]  3. Sit to stand transfer with Modified [place] using RW   4. Bed to chair transfer with Modified [place] using Rolling Walker  5. Gait x50 feet with mod I using Rolling Walker  6. Wheelchair propulsion x250 feet with Modified [place] using bilateral upper extremities  7. Lower extremity exercise program 2 sets x15 reps per handout, with independence                         Time Tracking:     PT Received On: 04/22/23  PT Start Time: 1127     PT Stop Time: 1156  PT Total Time (min): 29 min     Billable Minutes: Therapeutic Activity 15 min and Train/Wheelchair Management 14 min    Treatment Type: Treatment  PT/PTA: PTA     Number of PTA visits since last PT visit: 1 04/22/2023

## 2023-04-22 NOTE — PLAN OF CARE
Pt alert able to make needs known, tolerates medications well by mouth, no signs or symptoms of adverse reactions noted, repositioned self q 2hrs, up to [facility] for toileting with stanby assist and walker, pain controlled by PRN pain medication, plan of care explained, diet tolerated, remains free from falls and hospital acquired pressure injuries, safety maintained. Will continue following plan of care. Dressing to LLE BKA C,D,I. Immobilizer in place.    Problem: Adult Inpatient Plan of Care  Goal: Plan of Care Review  Outcome: Ongoing, Progressing  Goal: Patient-Specific Goal (Individualized)  Outcome: Ongoing, Progressing  Goal: Absence of Hospital-Acquired Illness or Injury  Outcome: Ongoing, Progressing  Goal: Optimal Comfort and Wellbeing  Outcome: Ongoing, Progressing  Goal: Readiness for Transition of Care  Outcome: Ongoing, Progressing     Problem: Diabetes Comorbidity  Goal: Blood Glucose Level Within Targeted Range  Outcome: Ongoing, Progressing     Problem: Impaired Wound Healing  Goal: Optimal Wound Healing  Outcome: Ongoing, Progressing     Problem: Fall Injury Risk  Goal: Absence of Fall and Fall-Related Injury  Outcome: Ongoing, Progressing     Problem: Skin Injury Risk Increased  Goal: Skin Health and Integrity  Outcome: Ongoing, Progressing

## 2023-04-22 NOTE — PROGRESS NOTES
[facility] Surg  General Surgery  Progress Note    Subjective:     History of Present Illness:  No notes on file    Post-Op Info:  Procedure(s) (LRB):  AMPUTATION, BELOW KNEE (Left)   3 Days Post-Op     Interval History:  Status post left BKA for 4/19/2023  Doing okay from the standpoint.    Awaiting transfer to rehab facility.    Placement hopefully to be done in the next couple of days.        Medications:  Continuous Infusions:  Scheduled Meds:   acetaminophen  1,000 mg Oral Q8H    aspirin  81 mg Oral Daily    bumetanide  1 mg Oral Daily    cetirizine  10 mg Oral Daily    divalproex  500 mg Oral Daily    enoxaparin  40 mg Subcutaneous Daily    gabapentin  600 mg Oral TID    LIDOcaine  1 patch Transdermal Q24H    methocarbamoL  1,000 mg Oral TID    metoprolol tartrate  25 mg Oral BID    mupirocin   Nasal BID    nicotine  1 patch Transdermal Daily    pantoprazole  40 mg Oral Daily    polyethylene glycol  17 g Oral Daily    risperiDONE  3 mg Oral BID    senna-docusate 8.6-50 mg  1 tablet Oral Daily     PRN Meds:acetaminophen, albuterol-ipratropium, dextrose 50%, dextrose 50%, glucagon (human recombinant), glucose, glucose, hydrALAZINE, hydrOXYzine, insulin aspart U-100, labetalol, melatonin, ondansetron, oxyCODONE, oxyCODONE, promethazine, sodium chloride 0.9%     Review of patient's allergies indicates:   Allergen Reactions    Morphine Other (See Comments)     Patient had a psychotic episode after taking Morphine  Agitation, hallucinations    Penicillins Anaphylaxis     Tolerated cephalosporins in the past    Januvia [sitagliptin] Hives    Carbamazepine Other (See Comments)     hyponatremia     Objective:     Vital Signs (Most Recent):  Temp: 99.4 °F (37.4 °C) (04/22/23 1113)  Pulse: 75 (04/22/23 1113)  Resp: 19 (04/22/23 1134)  BP: (!) 113/54 (04/22/23 1113)  SpO2: 96 % (04/22/23 1113)   Vital Signs (24h Range):  Temp:  [98.3 °F (36.8 °C)-99.4 °F (37.4 °C)] 99.4 °F (37.4 °C)  Pulse:   [75-97] 75  Resp:  [15-19] 19  SpO2:  [92 %-98 %] 96 %  BP: (106-135)/(52-75) 113/54     Weight: 100.5 kg (221 lb 9 oz)  Body mass index is 35.76 kg/m².    Intake/Output - Last 3 Shifts         04/20 0700  04/21 0659 04/21 0700  04/22 0659 04/22 0700  04/23 0659    P.O. 360 1680 240    I.V. (mL/kg)       Total Intake(mL/kg) 360 (3.6) 1680 (16.7) 240 (2.4)    Urine (mL/kg/hr) 1400 (0.6) 1750 (0.7) 700 (1.2)    Stool  0     Blood       Total Output 1400 1750 700    Net -1040 -70 -460           Urine Occurrence 4 x 2 x     Stool Occurrence  2 x             Physical Exam  Vitals and nursing note reviewed.   Constitutional:       Appearance: She is well-developed.   HENT:      Head: Normocephalic and atraumatic.   Cardiovascular:      Rate and Rhythm: Normal rate.      Heart sounds: Normal heart sounds.   Pulmonary:      Effort: Pulmonary effort is normal.   Abdominal:      General: Bowel sounds are normal. There is no distension.      Palpations: Abdomen is soft.      Tenderness: There is no abdominal tenderness.   Musculoskeletal:         General: Normal range of motion.      Cervical back: Normal range of motion.   Skin:     General: Skin is warm and dry.      Capillary Refill: Capillary refill takes less than 2 seconds.             Comments: Stump healing well with no sign of infection   Neurological:      Mental Status: She is alert and oriented to person, place, and time.   Psychiatric:         Behavior: Behavior normal.       Significant Labs:  I have reviewed all pertinent lab results within the past 24 hours.  CBC:   Recent Labs   Lab 04/22/23 0415   WBC 16.21*   RBC 3.61*   HGB 8.0*   HCT 26.4*   *   MCV 73*   MCH 22.2*   MCHC 30.3*     CMP:   Recent Labs   Lab 04/22/23 0415   *   CALCIUM 8.9   *   K 4.2   CO2 30*   CL 96   BUN 9   CREATININE 0.7       Significant Diagnostics:  I have reviewed all pertinent imaging results/findings within the past 24 hours.    Assessment/Plan:     * Left  midfoot ulcer, with necrosis of muscle  51 yo F with chronic wound of left leg now s/p L BKA 4/19    - diabetic diet  - multimodal pain regimen: tylenol, robaxin, gabapentin, lidocaine patch scheduled; PRN berta with PRN IV morphine for breakthrough pain not relieved by oral medications  - Sliding scale insulin - moderate scale  - home meds   - PT, OT, mobilization  - SW consult, pending rehab placement. Patient does not have support at home - previously living in trailer, plans to move into studio apartment alone following recovery. HH recommended by PT on basis of home support, however patient will not have support and thus cannot go home with HH PT/OT   - DVT ppx         Niall Olivares MD  General Surgery  [facility] - [place] - Med Surg

## 2023-04-22 NOTE — NURSING
[facility], [facility]  Nurses Note -- 4 Eyes      4/22/2023       Skin assessed on: Q Shift      [x] No Pressure Injuries Present    []Prevention Measures Documented    [] Yes LDA  for Pressure Injury Previously documented     [] Yes New Pressure Injury Discovered   [] LDA for New Pressure Injury Added      Attending RN:  Marj Woods LPN     Second RN:  Zack Cuellar RN

## 2023-04-22 NOTE — PLAN OF CARE
Problem: Physical Therapy  Goal: Physical Therapy Goal  Description: Goals to be met by: 23     Patient will increase functional independence with mobility by performin. Supine to sit with Modified [place]  2. Rolling to Left and Right with Modified [place]  3. Sit to stand transfer with Modified [place] using RW   4. Bed to chair transfer with Modified [place] using Rolling Walker  5. Gait x50 feet with mod I using Rolling Walker  6. Wheelchair propulsion x250 feet with Modified [place] using bilateral upper extremities  7. Lower extremity exercise program 2 sets x15 reps per handout, with independence    Outcome: Ongoing, Progressing

## 2023-04-23 VITALS
WEIGHT: 221.56 LBS | RESPIRATION RATE: 18 BRPM | TEMPERATURE: 99 F | OXYGEN SATURATION: 96 % | BODY MASS INDEX: 35.61 KG/M2 | HEIGHT: 66 IN | SYSTOLIC BLOOD PRESSURE: 106 MMHG | HEART RATE: 86 BPM | DIASTOLIC BLOOD PRESSURE: 55 MMHG

## 2023-04-23 LAB
ANION GAP SERPL CALC-SCNC: 9 MMOL/L (ref 8–16)
BASOPHILS # BLD AUTO: 0.07 K/UL (ref 0–0.2)
BASOPHILS NFR BLD: 0.5 % (ref 0–1.9)
BUN SERPL-MCNC: 8 MG/DL (ref 6–20)
CALCIUM SERPL-MCNC: 9.4 MG/DL (ref 8.7–10.5)
CHLORIDE SERPL-SCNC: 97 MMOL/L (ref 95–110)
CO2 SERPL-SCNC: 31 MMOL/L (ref 23–29)
CREAT SERPL-MCNC: 0.6 MG/DL (ref 0.5–1.4)
DIFFERENTIAL METHOD: ABNORMAL
EOSINOPHIL # BLD AUTO: 0.9 K/UL (ref 0–0.5)
EOSINOPHIL NFR BLD: 7.1 % (ref 0–8)
ERYTHROCYTE [DISTWIDTH] IN BLOOD BY AUTOMATED COUNT: 19.5 % (ref 11.5–14.5)
EST. GFR  (NO RACE VARIABLE): >60 ML/MIN/1.73 M^2
GLUCOSE SERPL-MCNC: 238 MG/DL (ref 70–110)
HCT VFR BLD AUTO: 26.8 % (ref 37–48.5)
HGB BLD-MCNC: 8.4 G/DL (ref 12–16)
IMM GRANULOCYTES # BLD AUTO: 0.12 K/UL (ref 0–0.04)
IMM GRANULOCYTES NFR BLD AUTO: 0.9 % (ref 0–0.5)
LYMPHOCYTES # BLD AUTO: 3.9 K/UL (ref 1–4.8)
LYMPHOCYTES NFR BLD: 30.6 % (ref 18–48)
MCH RBC QN AUTO: 22.8 PG (ref 27–31)
MCHC RBC AUTO-ENTMCNC: 31.3 G/DL (ref 32–36)
MCV RBC AUTO: 73 FL (ref 82–98)
MONOCYTES # BLD AUTO: 1.5 K/UL (ref 0.3–1)
MONOCYTES NFR BLD: 12 % (ref 4–15)
NEUTROPHILS # BLD AUTO: 6.2 K/UL (ref 1.8–7.7)
NEUTROPHILS NFR BLD: 48.9 % (ref 38–73)
NRBC BLD-RTO: 1 /100 WBC
PLATELET # BLD AUTO: 689 K/UL (ref 150–450)
PMV BLD AUTO: 10.2 FL (ref 9.2–12.9)
POCT GLUCOSE: 269 MG/DL (ref 70–110)
POCT GLUCOSE: 357 MG/DL (ref 70–110)
POTASSIUM SERPL-SCNC: 4.3 MMOL/L (ref 3.5–5.1)
RBC # BLD AUTO: 3.69 M/UL (ref 4–5.4)
SODIUM SERPL-SCNC: 137 MMOL/L (ref 136–145)
WBC # BLD AUTO: 12.76 K/UL (ref 3.9–12.7)

## 2023-04-23 PROCEDURE — 94761 N-INVAS EAR/PLS OXIMETRY MLT: CPT

## 2023-04-23 PROCEDURE — 25000003 PHARM REV CODE 250: Performed by: STUDENT IN AN ORGANIZED HEALTH CARE EDUCATION/TRAINING PROGRAM

## 2023-04-23 PROCEDURE — S4991 NICOTINE PATCH NONLEGEND: HCPCS | Performed by: STUDENT IN AN ORGANIZED HEALTH CARE EDUCATION/TRAINING PROGRAM

## 2023-04-23 PROCEDURE — 80048 BASIC METABOLIC PNL TOTAL CA: CPT | Performed by: STUDENT IN AN ORGANIZED HEALTH CARE EDUCATION/TRAINING PROGRAM

## 2023-04-23 PROCEDURE — 85025 COMPLETE CBC W/AUTO DIFF WBC: CPT | Performed by: STUDENT IN AN ORGANIZED HEALTH CARE EDUCATION/TRAINING PROGRAM

## 2023-04-23 PROCEDURE — 25000003 PHARM REV CODE 250: Performed by: SURGERY

## 2023-04-23 PROCEDURE — 36415 COLL VENOUS BLD VENIPUNCTURE: CPT | Performed by: STUDENT IN AN ORGANIZED HEALTH CARE EDUCATION/TRAINING PROGRAM

## 2023-04-23 RX ADMIN — LIDOCAINE 1 PATCH: 50 PATCH TOPICAL at 09:04

## 2023-04-23 RX ADMIN — OXYCODONE 10 MG: 5 TABLET ORAL at 09:04

## 2023-04-23 RX ADMIN — CETIRIZINE HYDROCHLORIDE 10 MG: 10 TABLET, FILM COATED ORAL at 08:04

## 2023-04-23 RX ADMIN — DIVALPROEX SODIUM 500 MG: 250 TABLET, DELAYED RELEASE ORAL at 08:04

## 2023-04-23 RX ADMIN — ACETAMINOPHEN 1000 MG: 500 TABLET, FILM COATED ORAL at 05:04

## 2023-04-23 RX ADMIN — METHOCARBAMOL 1000 MG: 500 TABLET ORAL at 08:04

## 2023-04-23 RX ADMIN — MUPIROCIN: 20 OINTMENT TOPICAL at 08:04

## 2023-04-23 RX ADMIN — INSULIN ASPART 10 UNITS: 100 INJECTION, SOLUTION INTRAVENOUS; SUBCUTANEOUS at 11:04

## 2023-04-23 RX ADMIN — ASPIRIN 81 MG 81 MG: 81 TABLET ORAL at 08:04

## 2023-04-23 RX ADMIN — INSULIN ASPART 6 UNITS: 100 INJECTION, SOLUTION INTRAVENOUS; SUBCUTANEOUS at 07:04

## 2023-04-23 RX ADMIN — OXYCODONE 10 MG: 5 TABLET ORAL at 04:04

## 2023-04-23 RX ADMIN — RISPERIDONE 3 MG: 1 TABLET ORAL at 08:04

## 2023-04-23 RX ADMIN — GABAPENTIN 600 MG: 300 CAPSULE ORAL at 08:04

## 2023-04-23 RX ADMIN — PANTOPRAZOLE SODIUM 40 MG: 40 TABLET, DELAYED RELEASE ORAL at 08:04

## 2023-04-23 RX ADMIN — NICOTINE 1 PATCH: 14 PATCH TRANSDERMAL at 08:04

## 2023-04-23 RX ADMIN — BUMETANIDE 1 MG: 1 TABLET ORAL at 08:04

## 2023-04-23 RX ADMIN — METOPROLOL TARTRATE 25 MG: 25 TABLET, FILM COATED ORAL at 08:04

## 2023-04-23 NOTE — NURSING
[facility], [facility]  Nurses Note -- 4 Eyes      4/23/2023       Skin assessed on: Q Shift      [x] No Pressure Injuries Present    []Prevention Measures Documented    [] Yes LDA  for Pressure Injury Previously documented     [] Yes New Pressure Injury Discovered   [] LDA for New Pressure Injury Added      Attending RN:  Marj Woods LPN     Second RN:  Zack Cuellar RN

## 2023-04-23 NOTE — NURSING
[facility], [facility] - [place]  Nurses Note -- 4 Eyes      4/23/2023       Skin assessed on: Q Shift      [x] No Pressure Injuries Present    []Prevention Measures Documented    [] Yes LDA  for Pressure Injury Previously documented     [] Yes New Pressure Injury Discovered   [] LDA for New Pressure Injury Added      Attending RN:  Zack Cuellar RN     Second RN:  Jazmin Woods RN

## 2023-04-23 NOTE — PLAN OF CARE
[place] - Med Surg  Individualized Plan of Care     Rehabilitation Nursing: Weight bearing precautions and recent amputation       Patient to be seen a minimum of 3 hours for therapy per day/a minimum of 5 out of 7 days per week as follows:      PT: 1 hours per day 5 days per week              Treatments include: Therapeutic exercise, Bed mobility, Gait training, Neuromuscular re-education, Wheelchair mobility training, and Transfer training     OT: 1 hours per day 5 days per week              Treatments include: Self-care, Home management, Transfer training, Therapeutic exercise, Therapeutic activity, Energy conservation training, and Wheelchair mobility training          Prosthetics/Orthotics: 1 hours per day 5 days a week              Treatments include: Prosthetic measurement and fabrication     Dietician/nutritionist to monitor calorie count as well as intake and work with speech pathology on dietary upgrades as they occur.     Medical Issues being managed closely and that require the 24 hour availability of a physician: Weight bearing precautions     Brief Synthesis of Preadmission Screen, Post-Admission Evaluation, and Therapy Evaluations     Medical Prognosis: fair      Anticipated length of stay: 1 momth     Rehab Goals: Supervision with ambulation and ADLs using least restrictive assistive device     [facility]     Expected Functional Outcomes:  Mobility: decreased due to amputation

## 2023-04-23 NOTE — PLAN OF CARE
04/23/23 1220   Final Note   Assessment Type Final Discharge Note   Anticipated Discharge Disposition Rehab   Post-Acute Status   Post-Acute Authorization Placement   Post-Acute Placement Status Set-up Complete/Auth obtained   Discharge Delays (!) PFC Arranged Transportation

## 2023-04-23 NOTE — NURSING
Pt discharged per MD order to [facility]. Discharge instructions reviewed with pt and family. Given the opportunity for questions. All questions answered. AVS given to pt and placed in blue folder. Patient verbalized understanding of all instructions. Vitals per chart. afebrile. No complaints of pain, N/V, diarrhea, or SOB.Pt leaving unit via [facility] wheelchair transport to [facility].

## 2023-04-23 NOTE — PLAN OF CARE
Problem: Adult Inpatient Plan of Care  Goal: Plan of Care Review  Outcome: Met  Goal: Patient-Specific Goal (Individualized)  Outcome: Met  Goal: Absence of Hospital-Acquired Illness or Injury  Outcome: Met  Goal: Optimal Comfort and Wellbeing  Outcome: Met  Goal: Readiness for Transition of Care  Outcome: Met     Problem: Diabetes Comorbidity  Goal: Blood Glucose Level Within Targeted Range  Outcome: Met     Problem: Impaired Wound Healing  Goal: Optimal Wound Healing  Outcome: Met     Problem: Fall Injury Risk  Goal: Absence of Fall and Fall-Related Injury  Outcome: Met     Problem: Skin Injury Risk Increased  Goal: Skin Health and Integrity  Outcome: Met

## 2023-04-23 NOTE — PROGRESS NOTES
NURSING HOME ORDERS    04/23/2023  [facility] - MED SURG  [address] [address]  Dept: [phone]  Loc: [phone]     Admit to [facility]    Diagnoses:  Active Hospital Problems    Diagnosis  POA    *Left midfoot ulcer, with necrosis of muscle [L97.423]  Yes    COPD (chronic obstructive pulmonary disease) [J44.9]  Yes     Chronic    Type II diabetes mellitus with neurological manifestations [E11.49]  Yes    Essential hypertension [I10]  Yes     Chronic      Resolved Hospital Problems   No resolved problems to display.       Patient is homebound due to:  Left midfoot ulcer, with necrosis of muscle    Allergies:  Review of patient's allergies indicates:   Allergen Reactions    Morphine Other (See Comments)     Patient had a psychotic episode after taking Morphine  Agitation, hallucinations    Penicillins Anaphylaxis     Tolerated cephalosporins in the past    Januvia [sitagliptin] Hives    Carbamazepine Other (See Comments)     hyponatremia       Vitals:  Routine    Diet: diabetic diet: 2200 calorie    Activities:   Up in a chair each morning as tolerated and Ambulate with assistance to bathroom    Goals of Care Treatment Preferences:  Code Status: Full Code    Living Will: Yes            Nursing Precautions:  Pressure ulcer prevention    Consults:   PT to evaluate and treat- 5 times a week and OT to evaluate and treat- 5 times a week     Miscellaneous Care: Wound Care: yes:  Surgical Wound:  Location: left leg                     Diabetes Care:  SN to perform and educate Diabetic management with blood glucose monitoring:      Medications: Discontinue all previous medication orders, if any. See new list below.     Medication List        ASK your doctor about these medications      acetaminophen 500 MG tablet  Commonly known as: TYLENOL  Take 2 tablets (1,000 mg total) by mouth every 6 (six) hours as needed for Pain.     albuterol 90 mcg/actuation inhaler  Commonly  known as: PROVENTIL/VENTOLIN HFA  INHALE 2 PUFFS INTO THE LUNGS EVERY 6 HOURS AS NEEDED FOR WHEEZING. RESCUE     albuterol-ipratropium 2.5 mg-0.5 mg/3 mL nebulizer solution  Commonly known as: DUO-NEB  Take 3 mLs by nebulization every 6 (six) hours as needed for Wheezing or Shortness of Breath. Rescue     ammonium lactate 12 % lotion  Commonly known as: LAC-HYDRIN  APPL Y ONCE TOPICALLY TWICE DAILY FOR 30 DAYS     apixaban 5 mg Tab  Commonly known as: ELIQUIS  Take 1 tablet (5 mg total) by mouth 2 (two) times daily.     aspirin 81 MG Chew  Take 1 tablet (81 mg total) by mouth once daily.     BIOFREEZE (MENTHOL) TOP  Apply topically.     bumetanide 1 MG tablet  Commonly known as: BUMEX  Take 1 tablet (1 mg total) by mouth once daily.     cyanocobalamin 1000 MCG tablet  Commonly known as: VITAMIN B-12  Take 100 mcg by mouth once daily.     divalproex 500 MG Tbec  Commonly known as: DEPAKOTE  Take 1 tablet (500 mg total) by mouth once daily. PO QAM     DUPIXENT  mg/2 mL Pnij  Generic drug: dupilumab  Inject into the skin.     ferrous sulfate 325 (65 FE) MG EC tablet  Take 1 tablet (325 mg total) by mouth once daily.     fluticasone propionate 50 mcg/actuation nasal spray  Commonly known as: FLONASE  2 sprays (100 mcg total) by Each Nostril route daily as needed (Nasal congestion).     fluticasone-salmeterol 250-50 mcg/dose 250-50 mcg/dose diskus inhaler  Commonly known as: ADVAIR  Inhale 1 puff into the lungs 2 (two) times daily. Controller     GAVILAX 17 gram/dose powder  Generic drug: polyethylene glycol  Mix 1 capful (17 g) with fluids and drink by mouth once daily.     hydrOXYzine 50 MG tablet  Commonly known as: ATARAX  Take 0.5 tablets (25 mg total) by mouth 4 (four) times daily as needed for Itching or Anxiety.     * LIDOcaine 5 %  Commonly known as: LIDODERM  Place 1 patch onto the skin once daily. Remove & Discard patch within 12 hours or as directed by MD     * ASPERCREME (LIDOcaine) 4 % Ptmd  Generic  drug: LIDOcaine  Apply 1 patch topically.     lisinopriL 10 MG tablet  TAKE 1 TABLET(10 MG) BY MOUTH EVERY DAY     loratadine 10 mg tablet  Commonly known as: CLARITIN  Take 1 tablet (10 mg total) by mouth once daily.     metFORMIN 1000 MG tablet  Commonly known as: GLUCOPHAGE  Take 1 tablet (1,000 mg total) by mouth 2 (two) times daily with meals.     methocarbamoL 500 MG Tab  Commonly known as: ROBAXIN  Take 500 mg by mouth. Frequency could not be confirmed.     metoprolol tartrate 25 MG tablet  Commonly known as: LOPRESSOR  Take 1 tablet (25 mg total) by mouth 2 (two) times daily.     multivitamin Tab  Take 1 tablet by mouth once daily.     nicotine 14 mg/24 hr  Commonly known as: NICODERM CQ  Place 1 patch onto the skin once daily.     nystatin powder  Commonly known as: NYSTOP  APPLY TO ABDOMINAL AND BREAST SKIN FOLD TWICE DAILY.     ondansetron 8 MG tablet  Commonly known as: ZOFRAN  Take 1 tablet (8 mg total) by mouth every 8 (eight) hours as needed for Nausea.     OZEMPIC SUBQ  Inject into the skin.     pantoprazole 40 MG tablet  Commonly known as: PROTONIX  Take 1 tablet (40 mg total) by mouth once daily.     pravastatin 40 MG tablet  Commonly known as: PRAVACHOL  Take 1 tablet (40 mg total) by mouth every evening.     risperiDONE 3 MG disintegrating tablet  Commonly known as: RISPERDAL M-TABS  Take 1 tablet (3 mg total) by mouth 2 (two) times daily.     SENEXON-S 8.6-50 mg per tablet  Generic drug: senna-docusate 8.6-50 mg  Take 1 tablet by mouth once daily.     traMADoL 50 mg tablet  Commonly known as: ULTRAM  Take 1 tablet (50 mg total) by mouth every 8 (eight) hours as needed for Pain (foot pain).     vitamin E 1000 UNIT capsule  Take 1,000 Units by mouth once daily.     VYVANSE 40 MG Cap  Generic drug: lisdexamfetamine  Take 40 mg by mouth once daily.           * This list has 2 medication(s) that are the same as other medications prescribed for you. Read the directions carefully, and ask your doctor  or other care provider to review them with you.                    Immunizations Administered as of 4/23/2023       No immunizations on file.                Some patients may experience side effects after vaccination.  These may include fever, headache, muscle or joint aches.  Most symptoms resolve with 24-48 hours and do not require urgent medical evaluation unless they persist for more than 72 hours or symptoms are concerning for an unrelated medical condition.          _________________________________  Niall Olivares MD  04/23/2023

## 2023-04-23 NOTE — NURSING
[facility] [place]  Nurses Note -- 4 Eyes      4/23/2023       Skin assessed on: Discharge      [x] No Pressure Injuries Present    []Prevention Measures Documented    [] Yes LDA  for Pressure Injury Previously documented     [] Yes New Pressure Injury Discovered   [] LDA for New Pressure Injury Added      Attending RN:  Marj Woods LPN     Second RN:  Sharon Wilson

## 2023-04-23 NOTE — PLAN OF CARE
Orders received and viewed by [facility].  Call report information received and given to nurseMarj.    ADT 30 order placed for Van Transportation.  Requested  time:  1330  If transportation does not arrive at ETA time nurse will be instructed to follow protocol for transportation below:  How can I get in touch directly with dispatch, if needed?                 Non-emergent dispatch: [phone]      +++NURSING:  If Van does not arrive at requested time please call the above Non Emergent Dispatcher.  If issue not resolved please escalate to your charge nurse for further instructions.

## 2023-04-26 NOTE — DISCHARGE SUMMARY
[facility]  Discharge Note  Short Stay    Procedure(s) (LRB):  AMPUTATION, BELOW KNEE (Left)      OUTCOME: Patient tolerated treatment/procedure well without complication and is now ready for discharge.  Patient did well from BKA standpoint.    Wound appeared good prior to discharge.    She will be sent to rehab for PT and OT treatment.    No complications from surgery.      DISPOSITION: Rehab Facility    FINAL DIAGNOSIS:  Left midfoot ulcer, with necrosis of muscle    FOLLOWUP: In clinic    DISCHARGE INSTRUCTIONS:    Discharge Procedure Orders   Reason for not Ordering Smoking Cessation Referral     Order Specific Question Answer Comments   Reason for not ordering: Patient refused         TIME SPENT ON DISCHARGE: 10 minutes

## 2023-05-06 ENCOUNTER — HOSPITAL ENCOUNTER (EMERGENCY)
Facility: HOSPITAL | Age: 53
Discharge: HOME | End: 2023-05-06
Attending: EMERGENCY MEDICINE
Payer: MEDICAID

## 2023-05-06 VITALS
OXYGEN SATURATION: 95 % | DIASTOLIC BLOOD PRESSURE: 74 MMHG | RESPIRATION RATE: 20 BRPM | SYSTOLIC BLOOD PRESSURE: 166 MMHG | TEMPERATURE: 98 F | HEART RATE: 88 BPM

## 2023-05-06 DIAGNOSIS — D75.839 THROMBOCYTOSIS: Primary | ICD-10-CM

## 2023-05-06 DIAGNOSIS — M79.606 LEG PAIN: ICD-10-CM

## 2023-05-06 LAB
ANION GAP SERPL CALC-SCNC: 12 MMOL/L (ref 8–16)
BUN SERPL-MCNC: 15 MG/DL (ref 6–20)
CALCIUM SERPL-MCNC: 9.3 MG/DL (ref 8.7–10.5)
CHLORIDE SERPL-SCNC: 99 MMOL/L (ref 95–110)
CO2 SERPL-SCNC: 27 MMOL/L (ref 23–29)
CREAT SERPL-MCNC: 0.7 MG/DL (ref 0.5–1.4)
EST. GFR  (NO RACE VARIABLE): >60 ML/MIN/1.73 M^2
GLUCOSE SERPL-MCNC: 192 MG/DL (ref 70–110)
HCV AB SERPL QL IA: NORMAL
HIV 1+2 AB+HIV1 P24 AG SERPL QL IA: NORMAL
POTASSIUM SERPL-SCNC: 4.6 MMOL/L (ref 3.5–5.1)
SODIUM SERPL-SCNC: 138 MMOL/L (ref 136–145)

## 2023-05-06 PROCEDURE — 99239 PR HOSPITAL DISCHARGE DAY,>30 MIN: ICD-10-PCS | Mod: ,,, | Performed by: STUDENT IN AN ORGANIZED HEALTH CARE EDUCATION/TRAINING PROGRAM

## 2023-05-06 PROCEDURE — 25000003 PHARM REV CODE 250

## 2023-05-06 PROCEDURE — 25000003 PHARM REV CODE 250: Performed by: EMERGENCY MEDICINE

## 2023-05-06 PROCEDURE — 80048 BASIC METABOLIC PNL TOTAL CA: CPT

## 2023-05-06 PROCEDURE — 86803 HEPATITIS C AB TEST: CPT | Performed by: PHYSICIAN ASSISTANT

## 2023-05-06 PROCEDURE — 87389 HIV-1 AG W/HIV-1&-2 AB AG IA: CPT | Performed by: PHYSICIAN ASSISTANT

## 2023-05-06 PROCEDURE — 99284 EMERGENCY DEPT VISIT MOD MDM: CPT | Mod: 25

## 2023-05-06 PROCEDURE — 99284 PR EMERGENCY DEPT VISIT,LEVEL IV: ICD-10-PCS | Mod: ,,, | Performed by: EMERGENCY MEDICINE

## 2023-05-06 PROCEDURE — 99239 HOSP IP/OBS DSCHRG MGMT >30: CPT | Mod: ,,, | Performed by: STUDENT IN AN ORGANIZED HEALTH CARE EDUCATION/TRAINING PROGRAM

## 2023-05-06 PROCEDURE — 99284 EMERGENCY DEPT VISIT MOD MDM: CPT | Mod: ,,, | Performed by: EMERGENCY MEDICINE

## 2023-05-06 RX ORDER — OXYCODONE HYDROCHLORIDE 5 MG/1
5 TABLET ORAL
Status: COMPLETED | OUTPATIENT
Start: 2023-05-06 | End: 2023-05-06

## 2023-05-06 RX ORDER — HYDROCODONE BITARTRATE AND ACETAMINOPHEN 5; 325 MG/1; MG/1
1 TABLET ORAL
Status: COMPLETED | OUTPATIENT
Start: 2023-05-06 | End: 2023-05-06

## 2023-05-06 RX ORDER — GABAPENTIN 300 MG/1
300 CAPSULE ORAL
Status: COMPLETED | OUTPATIENT
Start: 2023-05-06 | End: 2023-05-06

## 2023-05-06 RX ORDER — METHOCARBAMOL 500 MG/1
1000 TABLET, FILM COATED ORAL
Status: COMPLETED | OUTPATIENT
Start: 2023-05-06 | End: 2023-05-06

## 2023-05-06 RX ADMIN — GABAPENTIN 300 MG: 300 CAPSULE ORAL at 02:05

## 2023-05-06 RX ADMIN — METHOCARBAMOL 1000 MG: 500 TABLET ORAL at 02:05

## 2023-05-06 RX ADMIN — HYDROCODONE BITARTRATE AND ACETAMINOPHEN 1 TABLET: 5; 325 TABLET ORAL at 03:05

## 2023-05-06 RX ADMIN — OXYCODONE HYDROCHLORIDE 5 MG: 5 TABLET ORAL at 02:05

## 2023-05-06 NOTE — ED NOTES
The patient was brought to the ER today by EMS from [facility]. She has been at [facility] x 2 weeks following a stay s/p left BKA. Her platelets were elevated on blood work and sent to er for concern for possible dvt. Pt reports hx of dvt. Denies sob. Denies chest pain. States she was supposed to go home tomorrow.

## 2023-05-06 NOTE — ED PROVIDER NOTES
Encounter Date: 5/6/2023       History     Chief Complaint   Patient presents with    Abnormal Lab     Pt to ED via EMS from [facility] for eval of R leg, possible DVT. Platelets high on labwork. L leg BKA. Pt denies any complaints at this time.      52-year-old female with history of prior DVTs and PE, hypertension, CAD, obesity, type 2 diabetes, and recent left BKA sent to the ED per [facility] for thrombocytosis.  Patient had labs checked this morning faintly count was 1,188 and sent here concerned for DVTs.  Patient does admit to mild left lower leg pain but states she bumped her leg while getting on the toilet this morning.  Denies pain to the incision site.  She denies fever, chest pain, shortness of breath, abdominal pain, or any other symptoms at this time.  Patient reports compliant with medication.  She is had DVTs in the past but has been several years and is on Eliquis daily for for prevention.      Review of patient's allergies indicates:   Allergen Reactions    Morphine Other (See Comments)     Patient had a psychotic episode after taking Morphine  Agitation, hallucinations    Penicillins Anaphylaxis     Tolerated cephalosporins in the past    Januvia [sitagliptin] Hives    Carbamazepine Other (See Comments)     hyponatremia     Past Medical History:   Diagnosis Date    ADHD (attention deficit hyperactivity disorder)     Arthritis     Asthma     Bipolar 1 disorder     Cataract     Cigarette smoker     COPD (chronic obstructive pulmonary disease)     Coronary artery disease     A fib    Depression     bipolar manic depresson    Diabetes mellitus     Diabetic foot ulcers     Diabetic neuropathy     DVT of lower extremity, bilateral 07/2013    bilateral LE DVT. [place] filter placed.     Encounter for blood transfusion     History of blood clots 1. Left Leg=2003; 2.Bilateral Groin=Blood Clots= 5 or 6/ 2013 & 7/2013; 3. LLL of Lung=7/2013;  4. Lt. Lower Leg=7/2013.     Pt. had 1st Blood Clot after  "[id number]=6019, & Last=2013. [place] Filter= Rt.Lateral Neck.    HTN (hypertension) 06/06/2013    Pt states that she does not have hypertension    Hypercholesteremia     Irregular heartbeat     Neuromuscular disorder     neuropathy feet    Obese     PE (pulmonary embolism) 07/2013    bilat LE DVT.     Restless leg syndrome      Past Surgical History:   Procedure Laterality Date    ABDOMINAL SURGERY  2010    gastric sleeve    BELOW KNEE AMPUTATION OF LOWER EXTREMITY Left 4/19/2023    Procedure: AMPUTATION, BELOW KNEE;  Surgeon: Gabe Munoz MD;  Location: [facility] OR;  Service: General;  Laterality: Left;  RN PREOP 4/11/2023    BILATERAL OOPHORECTOMY Bilateral 1/12/2015    CHOLECYSTECTOMY      DEBRIDEMENT OF FOOT Bilateral 5/10/2022    Procedure: DEBRIDEMENT, FOOT;  Surgeon: Maira De Los Santos DPM;  Location: [facility] OR;  Service: Podiatry;  Laterality: Bilateral;    DEBRIDEMENT OF FOOT Left 2/28/2023    Procedure: DEBRIDEMENT, FOOT,biopsy;  Surgeon: Maira De Los Santos DPM;  Location: [facility] OR;  Service: Podiatry;  Laterality: Left;  request misonix, wound VAC, possible neoxx    Green' s filter Right 7/4/2012    Right Neck & Tunneled Down.    HERNIA REPAIR      "[place] of Hernias Repaires around th Belly Button.", pt. states    INCISION AND DRAINAGE FOOT Left 12/24/2022    Procedure: INCISION AND DRAINAGE, FOOT;  Surgeon: Fahad Razo DPM;  Location: [facility] OR;  Service: Podiatry;  Laterality: Left;    LAPAROSCOPIC CHOLECYSTECTOMY N/A 9/10/2020    Procedure: CHOLECYSTECTOMY, LAPAROSCOPIC;  Surgeon: Montrell Gutierrez MD;  Location: [facility] OR;  Service: General;  Laterality: N/A;  RN PREOP 9/9----COVID Negative  9/9    OVARIAN CYST REMOVAL  3/13/2014    GA REMOVAL OF OVARY/TUBE(S)      SPLENECTOMY, TOTAL  July 2003    TONSILLECTOMY      as a child    TYMPANOSTOMY TUBE PLACEMENT  1976    VEIN SURGERY  2003    Lt leg     Family History   Problem Relation Age of Onset    Hypertension Father     Diabetes Father     Heart disease " Father     Cataracts Father     Diabetes Paternal Grandfather     Heart disease Paternal Grandfather     No Known Problems Mother     Ovarian cancer Maternal Grandmother          from this. ? age     No Known Problems Sister     No Known Problems Brother     No Known Problems Maternal Aunt     No Known Problems Maternal Uncle     No Known Problems Paternal Aunt     No Known Problems Paternal Uncle     No Known Problems Maternal Grandfather     Ovarian cancer Paternal Grandmother     Uterine cancer Neg Hx     Breast cancer Neg Hx     Colon cancer Neg Hx     Amblyopia Neg Hx     Blindness Neg Hx     Cancer Neg Hx     Glaucoma Neg Hx     Macular degeneration Neg Hx     Retinal detachment Neg Hx     Strabismus Neg Hx     Stroke Neg Hx     Thyroid disease Neg Hx      Social History     Tobacco Use    Smoking status: Every Day     Packs/day: 0.50     Years: 37.00     Pack years: 18.50     Types: Cigarettes     Last attempt to quit: 2020     Years since quittin.4    Smokeless tobacco: Current    Tobacco comments:     Enrolled in the [employer] on 5/3/14 ([facility] Member ID # [id number]). Ambulatory referral to Smoking Cessation Program   Substance Use Topics    Alcohol use: No     Alcohol/week: 0.0 standard drinks    Drug use: No     Review of Systems   Constitutional:  Negative for fever.   HENT:  Negative for sore throat.    Respiratory:  Negative for shortness of breath.    Cardiovascular:  Negative for chest pain.   Gastrointestinal:  Negative for nausea.   Genitourinary:  Negative for dysuria.   Musculoskeletal:  Negative for back pain.        Left leg pain   Skin:  Negative for rash.   Neurological:  Negative for weakness.   Hematological:  Does not bruise/bleed easily.       Physical Exam     Initial Vitals [23 1233]   BP Pulse Resp Temp SpO2   120/70 95 20 98.3 °F (36.8 °C) 96 %      MAP       --         Physical Exam    Constitutional: Vital signs are normal. She appears well-developed and  well-nourished. She is not diaphoretic. No distress.   HENT:   Head: Normocephalic and atraumatic.   Neck: Neck supple.   Cardiovascular:  Normal rate, regular rhythm and normal heart sounds.     Exam reveals no gallop and no friction rub.       No murmur heard.  Pulmonary/Chest: Breath sounds normal. She has no wheezes. She has no rhonchi. She has no rales.   Musculoskeletal:      Cervical back: Neck supple.      Comments: Left BKA visualized.  No tenderness to left stump with intact sensation.  No edema.  No tenderness or edema to right lower extremity with 2+ pulses, intact sensation, and normal range of motion.     Neurological: She is alert and oriented to person, place, and time. She has normal strength.   Skin:   Mild erythema with no tenderness, purulence, fluctuance, or induration.  Picture attached below.   Psychiatric: She has a normal mood and affect.           ED Course   Procedures  Labs Reviewed   BASIC METABOLIC PANEL - Abnormal; Notable for the following components:       Result Value    Glucose 192 (*)     All other components within normal limits   HIV 1 / 2 ANTIBODY    Narrative:     Release to patient->Immediate   HEPATITIS C ANTIBODY    Narrative:     Release to patient->Immediate          Imaging Results              US Lower Extremity Veins Bilateral (Final result)  Result time 05/06/23 14:54:10      Final result by Perfecto Nelson MD (05/06/23 14:54:10)                   Impression:      No evidence of deep venous thrombosis in either lower extremity.    Status post left below-knee amputation.    Electronically signed by resident: Tone Leija  Date:    05/06/2023  Time:    14:45    Electronically signed by: Perfecto Nelson MD  Date:    05/06/2023  Time:    14:54               Narrative:    EXAMINATION:  US LOWER EXTREMITY VEINS BILATERAL    CLINICAL HISTORY:  Pain in leg, unspecified    TECHNIQUE:  Duplex and color flow Doppler and dynamic compression was performed of the bilateral  lower extremity veins was performed.    COMPARISON:  Left lower extremity venous ultrasound 02/23/2023.  Bilateral lower extremity venous ultrasound 09/12/2022.    FINDINGS:  Right thigh veins: The common femoral, femoral, popliteal, upper greater saphenous, and deep femoral veins are patent and free of thrombus. The veins are normally compressible and have normal phasic flow and augmentation response.    Right calf veins: The visualized calf veins are patent.    Left thigh veins: The common femoral, femoral, upper greater saphenous, and deep femoral veins are patent and free of thrombus. The veins are normally compressible and have normal phasic flow and augmentation response.    Left calf veins: Status post left below-knee amputation.    Miscellaneous: None                                       Medications   methocarbamoL tablet 1,000 mg (1,000 mg Oral Given 5/6/23 1405)   gabapentin capsule 300 mg (300 mg Oral Given 5/6/23 1405)   oxyCODONE immediate release tablet 5 mg (5 mg Oral Given 5/6/23 1405)   HYDROcodone-acetaminophen 5-325 mg per tablet 1 tablet (1 tablet Oral Given 5/6/23 1548)     Medical Decision Making:   History:   Old Medical Records: I decided to obtain old medical records.  Old Records Summarized: records from clinic visits.       <> Summary of Records: Review of previous labs patient has chronic thrombocytosis with recent bump onset last night  Initial Assessment:   52-year-old female with history of prior DVTs and PE several years ago on Eliquis, hypertension, CAD, obesity, type 2 diabetes, and recent left BKA 4/23/23 sent to the ED per [facility] for thrombocytosis.  Asymptomatic except mild left stump pain after minor trauma this morning. VSS. Will get US to assess for DVT but low suspicion. Not concerned for infection or fracture with no tenderness.  Differential Diagnosis:   My differential diagnoses include but are not limited to:   DVT, thrombocytosis, contusion, wound infection,  electrolyte abnormality  Clinical Tests:   Lab Tests: Ordered and Reviewed  The following lab test(s) were unremarkable: BMP  Radiological Study: Ordered and Reviewed  ED Management:  US unremarkable. Discussed with hematology who recommends outpatient follow up. Referral placed. Advised to follow up with PCP and strict ED precautions given with all questions answered. Patient agrees to plan. Vitals are stable and safe for discharge.     I have reviewed the patient's records and discussed with my supervising physician.          Attending Attestation:     Physician Attestation Statement for NP/PA:   I have conducted a face to face encounter with this patient in addition to the NP/PA, due to Medical Complexity    Other NP/PA Attestation Additions:    History of Present Illness: 52-year-old woman presents for evaluation of left stump pain as well as elevated platelets noted on outpatient labs             ED Course as of 05/06/23 1557   Sat May 06, 2023   1439 Patient requested home medications as they are due around this time. [KB]   1517 Discussed with hematology about thrombocytosis, they recommend outpatient follow up with Hematology. [KB]      ED Course User Index  [KB] Mercedes Briseno PA-C                   Clinical Impression:   Final diagnoses:  [M79.606] Leg pain  [D75.839] Thrombocytosis (Primary)        ED Disposition Condition    Discharge Stable          ED Prescriptions    None       Follow-up Information       Follow up With Specialties Details Why Contact Info Additional Information    Donaldo Pena MD Internal Medicine, Wound Care Schedule an appointment as soon as possible for a visit  As needed [address] [facility]  [place] [address]  [phone]       [facility] Dept Emergency Medicine Go to  If symptoms worsen [address]  [place] [id number] [phone]     [facility] - Benign Hematology Hematology and Oncology Schedule an appointment as soon as possible  for a visit   [address] [facility] [address] [phone] Please park in the [facility] surface lot on the [address] side. Check in on the 5th floor. For appts with Dr. Mcpherson, check in on the 3rd floor.             Mercedes Briseno PA-C  05/06/23 [id number]

## 2023-05-06 NOTE — DISCHARGE INSTRUCTIONS
A referral has been sent to Hematology with the number provided below.  Advised to follow up with them for thrombocytosis which means elevated platelets. Continue taking your medications as prescribed. If your wound becomes more painful, red, and pus is draining please return to the ED or if new or worsening symptoms.

## 2023-05-06 NOTE — ED NOTES
Responded to call light; secure chat sent to ERP for pain left leg rated 10/10. Last pain med at rehab around 11am

## 2023-05-10 ENCOUNTER — OFFICE VISIT (OUTPATIENT)
Dept: SURGERY | Facility: CLINIC | Age: 53
End: 2023-05-10
Payer: MEDICAID

## 2023-05-10 VITALS
BODY MASS INDEX: 35.76 KG/M2 | HEART RATE: 90 BPM | SYSTOLIC BLOOD PRESSURE: 132 MMHG | DIASTOLIC BLOOD PRESSURE: 85 MMHG | HEIGHT: 66 IN | OXYGEN SATURATION: 97 %

## 2023-05-10 DIAGNOSIS — L97.423 LEFT MIDFOOT ULCER, WITH NECROSIS OF MUSCLE: Primary | ICD-10-CM

## 2023-05-10 PROCEDURE — 3008F PR BODY MASS INDEX (BMI) DOCUMENTED: ICD-10-PCS | Mod: CPTII,,, | Performed by: SURGERY

## 2023-05-10 PROCEDURE — 99024 PR POST-OP FOLLOW-UP VISIT: ICD-10-PCS | Mod: ,,, | Performed by: SURGERY

## 2023-05-10 PROCEDURE — 3079F PR MOST RECENT DIASTOLIC BLOOD PRESSURE 80-89 MM HG: ICD-10-PCS | Mod: CPTII,,, | Performed by: SURGERY

## 2023-05-10 PROCEDURE — 3051F HG A1C>EQUAL 7.0%<8.0%: CPT | Mod: CPTII,,, | Performed by: SURGERY

## 2023-05-10 PROCEDURE — 3079F DIAST BP 80-89 MM HG: CPT | Mod: CPTII,,, | Performed by: SURGERY

## 2023-05-10 PROCEDURE — 3075F PR MOST RECENT SYSTOLIC BLOOD PRESS GE 130-139MM HG: ICD-10-PCS | Mod: CPTII,,, | Performed by: SURGERY

## 2023-05-10 PROCEDURE — 4010F PR ACE/ARB THEARPY RXD/TAKEN: ICD-10-PCS | Mod: CPTII,,, | Performed by: SURGERY

## 2023-05-10 PROCEDURE — 1159F PR MEDICATION LIST DOCUMENTED IN MEDICAL RECORD: ICD-10-PCS | Mod: CPTII,,, | Performed by: SURGERY

## 2023-05-10 PROCEDURE — 1159F MED LIST DOCD IN RCRD: CPT | Mod: CPTII,,, | Performed by: SURGERY

## 2023-05-10 PROCEDURE — 99214 OFFICE O/P EST MOD 30 MIN: CPT | Mod: PBBFAC | Performed by: SURGERY

## 2023-05-10 PROCEDURE — 3051F PR MOST RECENT HEMOGLOBIN A1C LEVEL 7.0 - < 8.0%: ICD-10-PCS | Mod: CPTII,,, | Performed by: SURGERY

## 2023-05-10 PROCEDURE — 99999 PR PBB SHADOW E&M-EST. PATIENT-LVL IV: ICD-10-PCS | Mod: PBBFAC,,, | Performed by: SURGERY

## 2023-05-10 PROCEDURE — 3008F BODY MASS INDEX DOCD: CPT | Mod: CPTII,,, | Performed by: SURGERY

## 2023-05-10 PROCEDURE — 99999 PR PBB SHADOW E&M-EST. PATIENT-LVL IV: CPT | Mod: PBBFAC,,, | Performed by: SURGERY

## 2023-05-10 PROCEDURE — 4010F ACE/ARB THERAPY RXD/TAKEN: CPT | Mod: CPTII,,, | Performed by: SURGERY

## 2023-05-10 PROCEDURE — 3075F SYST BP GE 130 - 139MM HG: CPT | Mod: CPTII,,, | Performed by: SURGERY

## 2023-05-10 PROCEDURE — 99024 POSTOP FOLLOW-UP VISIT: CPT | Mod: ,,, | Performed by: SURGERY

## 2023-05-10 RX ORDER — LIDOCAINE 50 MG/G
1 PATCH TOPICAL DAILY
Qty: 15 PATCH | Refills: 2 | Status: SHIPPED | OUTPATIENT
Start: 2023-05-10 | End: 2023-05-12 | Stop reason: SDUPTHER

## 2023-05-10 RX ORDER — SULFAMETHOXAZOLE AND TRIMETHOPRIM 800; 160 MG/1; MG/1
1 TABLET ORAL 2 TIMES DAILY
Qty: 14 TABLET | Refills: 0 | Status: SHIPPED | OUTPATIENT
Start: 2023-05-10 | End: 2023-05-17

## 2023-05-10 NOTE — PROGRESS NOTES
51 y/o woman s/p BKA here for wound check.    Wound had recent trauma and mild bleeding is present on bandage.     No prurlent drainage, no erythema    Impression: bka stump with recent trauma (fall)     Plan: will start bactrim to prevent hematoma infection, lidocaine patch for pain control, and close f/u (1 week)

## 2023-05-12 ENCOUNTER — OFFICE VISIT (OUTPATIENT)
Dept: FAMILY MEDICINE | Facility: CLINIC | Age: 53
End: 2023-05-12
Payer: MEDICAID

## 2023-05-12 ENCOUNTER — TELEPHONE (OUTPATIENT)
Dept: SURGERY | Facility: CLINIC | Age: 53
End: 2023-05-12
Payer: MEDICAID

## 2023-05-12 VITALS
OXYGEN SATURATION: 96 % | HEART RATE: 79 BPM | SYSTOLIC BLOOD PRESSURE: 106 MMHG | BODY MASS INDEX: 35.76 KG/M2 | HEIGHT: 66 IN | TEMPERATURE: 98 F | DIASTOLIC BLOOD PRESSURE: 58 MMHG

## 2023-05-12 DIAGNOSIS — E11.40 CONTROLLED TYPE 2 DIABETES MELLITUS WITH NEUROPATHY: ICD-10-CM

## 2023-05-12 DIAGNOSIS — G89.18 POST-OPERATIVE PAIN: ICD-10-CM

## 2023-05-12 DIAGNOSIS — I10 ESSENTIAL HYPERTENSION: Chronic | ICD-10-CM

## 2023-05-12 DIAGNOSIS — Z09 HOSPITAL DISCHARGE FOLLOW-UP: Primary | ICD-10-CM

## 2023-05-12 DIAGNOSIS — E78.5 HYPERLIPIDEMIA, UNSPECIFIED HYPERLIPIDEMIA TYPE: Chronic | ICD-10-CM

## 2023-05-12 DIAGNOSIS — J44.9 CHRONIC OBSTRUCTIVE PULMONARY DISEASE, UNSPECIFIED COPD TYPE: ICD-10-CM

## 2023-05-12 DIAGNOSIS — L97.423 LEFT MIDFOOT ULCER, WITH NECROSIS OF MUSCLE: ICD-10-CM

## 2023-05-12 PROCEDURE — 3078F DIAST BP <80 MM HG: CPT | Mod: CPTII,,, | Performed by: NURSE PRACTITIONER

## 2023-05-12 PROCEDURE — 3074F PR MOST RECENT SYSTOLIC BLOOD PRESSURE < 130 MM HG: ICD-10-PCS | Mod: CPTII,,, | Performed by: NURSE PRACTITIONER

## 2023-05-12 PROCEDURE — 99214 OFFICE O/P EST MOD 30 MIN: CPT | Mod: S$PBB,,, | Performed by: NURSE PRACTITIONER

## 2023-05-12 PROCEDURE — 4010F ACE/ARB THERAPY RXD/TAKEN: CPT | Mod: CPTII,,, | Performed by: NURSE PRACTITIONER

## 2023-05-12 PROCEDURE — 99999 PR PBB SHADOW E&M-EST. PATIENT-LVL V: CPT | Mod: PBBFAC,,, | Performed by: NURSE PRACTITIONER

## 2023-05-12 PROCEDURE — 1159F PR MEDICATION LIST DOCUMENTED IN MEDICAL RECORD: ICD-10-PCS | Mod: CPTII,,, | Performed by: NURSE PRACTITIONER

## 2023-05-12 PROCEDURE — 1160F PR REVIEW ALL MEDS BY PRESCRIBER/CLIN PHARMACIST DOCUMENTED: ICD-10-PCS | Mod: CPTII,,, | Performed by: NURSE PRACTITIONER

## 2023-05-12 PROCEDURE — 99214 PR OFFICE/OUTPT VISIT, EST, LEVL IV, 30-39 MIN: ICD-10-PCS | Mod: S$PBB,,, | Performed by: NURSE PRACTITIONER

## 2023-05-12 PROCEDURE — 3051F HG A1C>EQUAL 7.0%<8.0%: CPT | Mod: CPTII,,, | Performed by: NURSE PRACTITIONER

## 2023-05-12 PROCEDURE — 3008F BODY MASS INDEX DOCD: CPT | Mod: CPTII,,, | Performed by: NURSE PRACTITIONER

## 2023-05-12 PROCEDURE — 1159F MED LIST DOCD IN RCRD: CPT | Mod: CPTII,,, | Performed by: NURSE PRACTITIONER

## 2023-05-12 PROCEDURE — 1160F RVW MEDS BY RX/DR IN RCRD: CPT | Mod: CPTII,,, | Performed by: NURSE PRACTITIONER

## 2023-05-12 PROCEDURE — 3051F PR MOST RECENT HEMOGLOBIN A1C LEVEL 7.0 - < 8.0%: ICD-10-PCS | Mod: CPTII,,, | Performed by: NURSE PRACTITIONER

## 2023-05-12 PROCEDURE — 99999 PR PBB SHADOW E&M-EST. PATIENT-LVL V: ICD-10-PCS | Mod: PBBFAC,,, | Performed by: NURSE PRACTITIONER

## 2023-05-12 PROCEDURE — 99215 OFFICE O/P EST HI 40 MIN: CPT | Mod: PBBFAC,PN | Performed by: NURSE PRACTITIONER

## 2023-05-12 PROCEDURE — 3074F SYST BP LT 130 MM HG: CPT | Mod: CPTII,,, | Performed by: NURSE PRACTITIONER

## 2023-05-12 PROCEDURE — 4010F PR ACE/ARB THEARPY RXD/TAKEN: ICD-10-PCS | Mod: CPTII,,, | Performed by: NURSE PRACTITIONER

## 2023-05-12 PROCEDURE — 3078F PR MOST RECENT DIASTOLIC BLOOD PRESSURE < 80 MM HG: ICD-10-PCS | Mod: CPTII,,, | Performed by: NURSE PRACTITIONER

## 2023-05-12 PROCEDURE — 1111F PR DISCHARGE MEDS RECONCILED W/ CURRENT OUTPATIENT MED LIST: ICD-10-PCS | Mod: CPTII,,, | Performed by: NURSE PRACTITIONER

## 2023-05-12 PROCEDURE — 1111F DSCHRG MED/CURRENT MED MERGE: CPT | Mod: CPTII,,, | Performed by: NURSE PRACTITIONER

## 2023-05-12 PROCEDURE — 3008F PR BODY MASS INDEX (BMI) DOCUMENTED: ICD-10-PCS | Mod: CPTII,,, | Performed by: NURSE PRACTITIONER

## 2023-05-12 RX ORDER — METHOCARBAMOL 500 MG/1
500 TABLET, FILM COATED ORAL 3 TIMES DAILY
Qty: 45 TABLET | Refills: 0 | Status: SHIPPED | OUTPATIENT
Start: 2023-05-12 | End: 2023-06-15 | Stop reason: SDUPTHER

## 2023-05-12 RX ORDER — GABAPENTIN 300 MG/1
CAPSULE ORAL
Qty: 90 CAPSULE | Refills: 0 | Status: SHIPPED | OUTPATIENT
Start: 2023-05-12 | End: 2023-06-15

## 2023-05-12 RX ORDER — OXYCODONE HYDROCHLORIDE 5 MG/1
5 TABLET ORAL EVERY 8 HOURS PRN
Qty: 21 TABLET | Refills: 0 | Status: SHIPPED | OUTPATIENT
Start: 2023-05-12 | End: 2023-06-15 | Stop reason: ALTCHOICE

## 2023-05-12 NOTE — PROGRESS NOTES
Chief Complaint  Chief Complaint   Patient presents with    Hospital Follow Up    Medication Refill       HPI    HPI   Ms. Audrey Natarajan is a 52 y.o. female with medical problems as listed below. The patient presents to clinic for hospital follow up. The patient was in the hosptial for LEFT BKA. She was then discharged to rehab facility.    She was recently in the ED on 5/6/2023. ED course as follows:   Abnormal Lab       Pt to ED via EMS from [facility] for eval of R leg, possible DVT. Platelets high on labwork. L leg BKA. Pt denies any complaints at this time.       52-year-old female with history of prior DVTs and PE, hypertension, CAD, obesity, type 2 diabetes, and recent left BKA sent to the ED per [facility] for thrombocytosis.  Patient had labs checked this morning faintly count was 1,188 and sent here concerned for DVTs.  Patient does admit to mild left lower leg pain but states she bumped her leg while getting on the toilet this morning.  Denies pain to the incision site.  She denies fever, chest pain, shortness of breath, abdominal pain, or any other symptoms at this time.  Patient reports compliant with medication.  She is had DVTs in the past but has been several years and is on Eliquis daily for for prevention.    Medical Decision Making:   History:   Old Medical Records: I decided to obtain old medical records.  Old Records Summarized: records from clinic visits.       <> Summary of Records: Review of previous labs patient has chronic thrombocytosis with recent bump onset last night  Initial Assessment:   52-year-old female with history of prior DVTs and PE several years ago on Eliquis, hypertension, CAD, obesity, type 2 diabetes, and recent left BKA 4/23/23 sent to the ED per [facility] for thrombocytosis.  Asymptomatic except mild left stump pain after minor trauma this morning. VSS. Will get US to assess for DVT but low suspicion. Not concerned for infection or fracture with no  tenderness.  Differential Diagnosis:   My differential diagnoses include but are not limited to:   DVT, thrombocytosis, contusion, wound infection, electrolyte abnormality  Clinical Tests:   Lab Tests: Ordered and Reviewed  The following lab test(s) were unremarkable: BMP  Radiological Study: Ordered and Reviewed  ED Management:  US unremarkable. Discussed with hematology who recommends outpatient follow up. Referral placed. Advised to follow up with PCP and strict ED precautions given with all questions answered. Patient agrees to plan. Vitals are stable and safe for discharge.      I have reviewed the patient's records and discussed with my supervising physician.    The patient has already had follow up with surgery.     Plan as follows:    53 y/o woman s/p BKA here for wound check.     Wound had recent trauma and mild bleeding is present on bandage.      No prurlent drainage, no erythema     Impression: bka stump with recent trauma (fall)      Plan: will start bactrim to prevent hematoma infection, lidocaine patch for pain control, and close f/u (1 week)    Since visit:    The patient states that she has been doing ok. She has been on the antibiotic and feels like it is helping. She does admit to having pretty severe pain in the area. She states she still has the staples to the area and feels like that is what is causing the pain. She states that pain can be coming throbbing at times. She states she has been taking her medication and she is almost out. She has follow up with Dr. Munoz next week and hopes to be getting the staples out and hopes that will help with the pain.    Has HH and has been doing well with PT/OT    Overall she is in good spirits and seems to be doing well at this time.     PAST MEDICAL HISTORY:  Past Medical History:   Diagnosis Date    ADHD (attention deficit hyperactivity disorder)     Arthritis     Asthma     Bipolar 1 disorder     Cataract     Cigarette smoker     COPD (chronic  "obstructive pulmonary disease)     Coronary artery disease     A fib    Depression     bipolar manic depresson    Diabetes mellitus     Diabetic foot ulcers     Diabetic neuropathy     DVT of lower extremity, bilateral 07/2013    bilateral LE DVT. Estelita filter placed.     Encounter for blood transfusion     History of blood clots 1. Left Leg=2003; 2.Bilateral Groin=Blood Clots= 5 or 6/ 2013 & 7/2013; 3. LLL of Lung=7/2013;  4. Lt. Lower Leg=7/2013.     Pt. had 1st Blood Clot after [id number], & Last=2013. Estelita Filter= Rt.Lateral Neck.    HTN (hypertension) 06/06/2013    Pt states that she does not have hypertension    Hypercholesteremia     Irregular heartbeat     Neuromuscular disorder     neuropathy feet    Obese     PE (pulmonary embolism) 07/2013    bilat LE DVT.     Restless leg syndrome        PAST SURGICAL HISTORY:  Past Surgical History:   Procedure Laterality Date    ABDOMINAL SURGERY  2010    gastric sleeve    BELOW KNEE AMPUTATION OF LOWER EXTREMITY Left 4/19/2023    Procedure: AMPUTATION, BELOW KNEE;  Surgeon: Gabe Munoz MD;  Location: [facility] OR;  Service: General;  Laterality: Left;  RN PREOP 4/11/2023    BILATERAL OOPHORECTOMY Bilateral 1/12/2015    CHOLECYSTECTOMY      DEBRIDEMENT OF FOOT Bilateral 5/10/2022    Procedure: DEBRIDEMENT, FOOT;  Surgeon: Maira De Los Santos DPM;  Location: [facility] OR;  Service: Podiatry;  Laterality: Bilateral;    DEBRIDEMENT OF FOOT Left 2/28/2023    Procedure: DEBRIDEMENT, FOOT,biopsy;  Surgeon: Maira De Los Santos DPM;  Location: [facility] OR;  Service: Podiatry;  Laterality: Left;  request misonix, wound VAC, possible neoxx    Green' s filter Right 7/4/2012    Right Neck & Tunneled Down.    HERNIA REPAIR      "Melvin of Hernias Repaires around th Belly Button.", pt. states    INCISION AND DRAINAGE FOOT Left 12/24/2022    Procedure: INCISION AND DRAINAGE, FOOT;  Surgeon: Fahad Razo DPM;  Location: [facility] OR;  Service: Podiatry;  Laterality: Left;    LAPAROSCOPIC " CHOLECYSTECTOMY N/A 9/10/2020    Procedure: CHOLECYSTECTOMY, LAPAROSCOPIC;  Surgeon: Montrell Gutierrez MD;  Location[facility];  Service: General;  Laterality: N/A;  RN PREOP ----COVID Negative      OVARIAN CYST REMOVAL  3/13/2014    CT REMOVAL OF OVARY/TUBE(S)      SPLENECTOMY, TOTAL  2003    TONSILLECTOMY      as a child    TYMPANOSTOMY TUBE PLACEMENT      VEIN SURGERY      Lt leg       SOCIAL HISTORY:  Social History     Socioeconomic History    Marital status: Significant Other   Tobacco Use    Smoking status: Every Day     Packs/day: 0.50     Years: 37.00     Pack years: 18.50     Types: Cigarettes     Last attempt to quit: 2020     Years since quittin.4    Smokeless tobacco: Current    Tobacco comments:     Enrolled in the [employer] on 5/3/14 ([facility] Member ID # [id number]). Ambulatory referral to Smoking Cessation Program   Substance and Sexual Activity    Alcohol use: No     Alcohol/week: 0.0 standard drinks    Drug use: No    Sexual activity: Yes     Partners: Male   Social History Narrative     from her  of 20 years    Lives by herself since      Social Determinants of Health     Financial Resource Strain: Medium Risk    Difficulty of Paying Living Expenses: Somewhat hard   Food Insecurity: Food Insecurity Present    Worried About Running Out of Food in the Last Year: Often true    Ran Out of Food in the Last Year: Often true   Transportation Needs: Unmet Transportation Needs    Lack of Transportation (Medical): Yes    Lack of Transportation (Non-Medical): Yes   Physical Activity: Inactive    Days of Exercise per Week: 0 days    Minutes of Exercise per Session: 0 min   Stress: Stress Concern Present    Feeling of Stress : To some extent   Social Connections: Moderately Isolated    Frequency of Communication with Friends and Family: More than three times a week    Frequency of Social Gatherings with Friends and Family: More than three times a week    Attends  [employer] Services: 1 to 4 times per year    Active Member of Clubs or Organizations: No    Attends Club or Organization Meetings: Never    Marital Status: Never    Housing Stability: Low Risk     Unable to Pay for Housing in the Last Year: No    Number of Places Lived in the Last Year: 1    Unstable Housing in the Last Year: No       FAMILY HISTORY:  Family History   Problem Relation Age of Onset    Hypertension Father     Diabetes Father     Heart disease Father     Cataracts Father     Diabetes Paternal Grandfather     Heart disease Paternal Grandfather     No Known Problems Mother     Ovarian cancer Maternal Grandmother          from this. ? age     No Known Problems Sister     No Known Problems Brother     No Known Problems Maternal Aunt     No Known Problems Maternal Uncle     No Known Problems Paternal Aunt     No Known Problems Paternal Uncle     No Known Problems Maternal Grandfather     Ovarian cancer Paternal Grandmother     Uterine cancer Neg Hx     Breast cancer Neg Hx     Colon cancer Neg Hx     Amblyopia Neg Hx     Blindness Neg Hx     Cancer Neg Hx     Glaucoma Neg Hx     Macular degeneration Neg Hx     Retinal detachment Neg Hx     Strabismus Neg Hx     Stroke Neg Hx     Thyroid disease Neg Hx        ALLERGIES AND MEDICATIONS: updated and reviewed.  Review of patient's allergies indicates:   Allergen Reactions    Morphine Other (See Comments)     Patient had a psychotic episode after taking Morphine  Agitation, hallucinations  Other Reaction(s): Other (See Comments), Other (See Comments)    Patient had a psychotic episode after taking Morphine    Patient had a psychotic episode after taking Morphine Agitation, hallucinations    Penicillins Anaphylaxis     Tolerated cephalosporins in the past    Januvia [sitagliptin] Hives    Carbamazepine Other (See Comments)     hyponatremia  Other Reaction(s): Other (See Comments)    hyponatremia     Current Outpatient Medications   Medication Sig  Dispense Refill    acetaminophen (TYLENOL) 500 MG tablet Take 2 tablets (1,000 mg total) by mouth every 6 (six) hours as needed for Pain. 30 tablet 0    albuterol (PROVENTIL/VENTOLIN HFA) 90 mcg/actuation inhaler INHALE 2 PUFFS INTO THE LUNGS EVERY 6 HOURS AS NEEDED FOR WHEEZING. RESCUE 6.7 g 2    albuterol-ipratropium (DUO-NEB) 2.5 mg-0.5 mg/3 mL nebulizer solution Take 3 mLs by nebulization every 6 (six) hours as needed for Wheezing or Shortness of Breath. Rescue 1 each 0    ammonium lactate (LAC-HYDRIN) 12 % lotion APPL Y ONCE TOPICALLY TWICE DAILY FOR 30 DAYS 225 g 0    apixaban (ELIQUIS) 5 mg Tab Take 1 tablet (5 mg total) by mouth in the morning and 1 tablet (5 mg total) in the evening. Indications: Thromboembolism secondary to Atrial Fibrillation. 60 tablet 0    ASPERCREME, LIDOCAINE, 4 % PtMd Apply 1 patch topically.      aspirin 81 MG Chew Take 1 tablet (81 mg total) by mouth once daily. 30 tablet 0    BIOFREEZE, MENTHOL, TOP Apply topically.      bumetanide (BUMEX) 1 MG tablet Take 1 tablet (1 mg total) by mouth once daily. 30 tablet 5    bumetanide (BUMEX) 1 MG tablet Take 1 tablet (1 mg total) by mouth in the morning. 30 tablet 0    cyanocobalamin (VITAMIN B-12) 1000 MCG tablet Take 100 mcg by mouth once daily.      divalproex (DEPAKOTE) 500 MG TbEC Take 1 tablet (500 mg total) by mouth once daily. PO QAM 30 tablet 11    divalproex (DEPAKOTE) 500 MG TbEC Take 1 tablet by mouth every morning 30 tablet 0    DUPIXENT  mg/2 mL PnIj Inject into the skin.      ferrous sulfate 325 (65 FE) MG EC tablet Take 1 tablet (325 mg total) by mouth once daily. 30 tablet 0    fluticasone propionate (FLONASE) 50 mcg/actuation nasal spray 2 sprays (100 mcg total) by Each Nostril route daily as needed (Nasal congestion). 9.9 mL 0    fluticasone-salmeterol diskus inhaler 250-50 mcg Inhale 1 puff into the lungs 2 (two) times daily. Controller 60 each 0    hydrOXYzine (ATARAX) 50 MG tablet Take 0.5 tablets (25 mg total)  "by mouth 4 (four) times daily as needed for Itching or Anxiety.      hydrOXYzine HCL (ATARAX) 25 MG tablet Take 1 tablet (25 mg total) by mouth every 6 (six) hours as needed for itching or anxiety. 20 tablet 0    insulin glargine 100 units/mL SubQ pen Inject 10 Units under the skin every morning Indications: Type 2 Diabetes. 15 mL 1    insulin lispro 100 unit/mL pen Inject 0-16 Units under the skin 4 (four) times a day before meals and nightly Indications: High Blood Sugar. 151-200 4 units 201-250 8 units 251-300 10 units 301-350 12 units 351-400 16 units >400 16 units & Call Medical Provider. 15 mL 1    LIDOcaine (LIDODERM) 5 % Place 1 patch onto the skin once daily. Remove & Discard patch within 12 hours or as directed by MD  0    lisinopriL 10 MG tablet TAKE 1 TABLET(10 MG) BY MOUTH EVERY DAY 90 tablet 3    loratadine (CLARITIN) 10 mg tablet Take 1 tablet (10 mg total) by mouth once daily. 90 tablet 3    metFORMIN (GLUCOPHAGE) 1000 MG tablet Take 1 tablet (1,000 mg total) by mouth 2 (two) times daily with meals. 180 tablet 1    metoprolol tartrate (LOPRESSOR) 25 MG tablet Take 1 tablet (25 mg total) by mouth 2 (two) times daily. 60 tablet 0    multivitamin Tab Take 1 tablet by mouth once daily. 30 tablet 2    nystatin (NYSTOP) powder APPLY TO ABDOMINAL AND BREAST SKIN FOLD TWICE DAILY. 60 g 0    ondansetron (ZOFRAN) 8 MG tablet Take 1 tablet (8 mg total) by mouth every 8 (eight) hours as needed for Nausea. 20 tablet 0    pantoprazole (PROTONIX) 40 MG tablet Take 1 tablet (40 mg total) by mouth once daily. 30 tablet 0    pantoprazole (PROTONIX) 40 MG tablet Take 1 tablet (40 mg total) by mouth Daily before breakfast. 30 tablet 0    pen needle, diabetic (BD ERIC 2ND GEN PEN NEEDLE) 32 gauge x 5/32" Ndle Use with insulin 5 times daily 200 each 1    polyethylene glycol (GLYCOLAX) 17 gram/dose powder Mix 1 capful (17 g) with fluids and drink by mouth once daily. 510 g 1    pravastatin (PRAVACHOL) 40 MG tablet Take 1 " tablet (40 mg total) by mouth every evening. 30 tablet 5    risperiDONE (RISPERDAL) 3 MG Tab Take 1 tablet (3 mg total) by mouth in the morning and 1 tablet (3 mg total) in the evening. Indications: Mood. 60 tablet 0    semaglutide (OZEMPIC SUBQ) Inject into the skin.      senna-docusate 8.6-50 mg (PERICOLACE) 8.6-50 mg per tablet Take 1 tablet by mouth once daily. 30 tablet 1    sulfamethoxazole-trimethoprim 800-160mg (BACTRIM DS) 800-160 mg Tab Take 1 tablet by mouth 2 (two) times daily. for 7 days 14 tablet 0    traMADoL (ULTRAM) 50 mg tablet Take 1 tablet (50 mg total) by mouth every 8 (eight) hours as needed for Pain (foot pain). 30 tablet 0    vitamin E 1000 UNIT capsule Take 1,000 Units by mouth once daily.      VYVANSE 40 mg Cap Take 40 mg by mouth once daily.      gabapentin (NEURONTIN) 300 MG capsule Take 2 capsules (600 mg total) by mouth 3 (three) times a day. 90 capsule 0    methocarbamoL (ROBAXIN) 500 MG Tab Take 1 tablet (500 mg total) by mouth 3 (three) times daily. Frequency could not be confirmed. for 15 days 45 tablet 0    nicotine (NICODERM CQ) 14 mg/24 hr Place 1 patch onto the skin once daily. (Patient not taking: Reported on 5/12/2023) 28 patch 0    oxyCODONE (ROXICODONE) 5 MG immediate release tablet Take 1 tablet (5 mg total) by mouth every 8 (eight) hours as needed for Pain. 21 tablet 0     No current facility-administered medications for this visit.       Patient Care Team:  Donaldo Pena MD as PCP - General (Internal Medicine)  [facility] Intake (Home Health Services)  [facility] (Home Health Services)    ROS  Review of Systems   Constitutional:  Negative for chills, fatigue, fever and unexpected weight change.   HENT:  Negative for congestion, ear discharge, ear pain and postnasal drip.    Eyes:  Negative for photophobia, pain and visual disturbance.   Respiratory:  Negative for cough, shortness of breath and wheezing.    Cardiovascular:  Negative for chest pain,  "palpitations and leg swelling.   Gastrointestinal:  Negative for abdominal pain, constipation, diarrhea, nausea and vomiting.   Genitourinary:  Negative for dysuria, frequency, urgency and vaginal discharge.   Musculoskeletal:  Positive for arthralgias. Negative for back pain, joint swelling and neck stiffness.   Skin:  Positive for wound. Negative for rash.   Neurological:  Negative for weakness and headaches.   Psychiatric/Behavioral:  Negative for dysphoric mood and sleep disturbance. The patient is not nervous/anxious.          Physical Exam  Vitals:    05/12/23 1326   BP: (!) 106/58   BP Location: Right arm   Patient Position: Sitting   BP Method: Large (Manual)   Pulse: 79   Temp: 98.4 °F (36.9 °C)   TempSrc: Oral   SpO2: 96%   Height: 5' 6" (1.676 m)    Body mass index is 35.76 kg/m².      Height: 5' 6" (167.6 cm)     Physical Exam  Constitutional:       Appearance: She is well-developed.   HENT:      Head: Normocephalic and atraumatic.      Right Ear: External ear normal.      Left Ear: External ear normal.      Nose: Nose normal.   Eyes:      Extraocular Movements: Extraocular movements intact.   Cardiovascular:      Rate and Rhythm: Normal rate.   Pulmonary:      Effort: Pulmonary effort is normal.   Abdominal:      Palpations: There is no hepatomegaly or splenomegaly.   Musculoskeletal:         General: Normal range of motion.      Cervical back: Normal range of motion.      Comments: LEFT BKA  Covered in dressing; unable to assess   Skin:     General: Skin is warm and dry.   Neurological:      Mental Status: She is alert and oriented to person, place, and time.   Psychiatric:         Behavior: Behavior normal.           Health Maintenance         Date Due Completion Date    COVID-19 Vaccine (1) Never done ---    Sign Pain Contract Never done ---    Colorectal Cancer Screening Never done ---    Pneumococcal Vaccines (Age 0-64) (2 - PCV) 10/24/2018 10/24/2017    Diabetes Urine Screening 11/06/2018 " 11/6/2017    Lipid Panel 04/19/2022 4/19/2021    Shingles Vaccine (1 of 2) Never done ---    Eye Exam 08/08/2023 (Originally 12/3/2020) 12/3/2019    Override on 2/7/2014: (N/S)    Hemoglobin A1c 09/15/2023 3/15/2023    Mammogram 11/01/2023 11/1/2022    Override on 5/7/2014: (N/S)    TETANUS VACCINE 01/09/2024 1/9/2014 (Done)    Override on 1/9/2014: Done    Foot Exam 02/23/2024 2/23/2023    Override on 6/3/2021: Done    Override on 11/25/2020: Done    Override on 2/6/2020: Done    Override on 9/25/2018: Done    Override on 6/5/2015: Done    Low Dose Statin 05/10/2024 5/10/2023    Cervical Cancer Screening 02/15/2027 2/15/2022    Override on 5/1/2013: (N/S)          Health maintenance reviewed at this time.    Assessment & Plan  Hospital discharge follow-up    Left midfoot ulcer, with necrosis of muscle  -     methocarbamoL (ROBAXIN) 500 MG Tab; Take 1 tablet (500 mg total) by mouth 3 (three) times daily. Frequency could not be confirmed. for 15 days  Dispense: 45 tablet; Refill: 0  -     gabapentin (NEURONTIN) 300 MG capsule; Take 2 capsules (600 mg total) by mouth 3 (three) times a day.  Dispense: 90 capsule; Refill: 0  -     oxyCODONE (ROXICODONE) 5 MG immediate release tablet; Take 1 tablet (5 mg total) by mouth every 8 (eight) hours as needed for Pain.  Dispense: 21 tablet; Refill: 0    Post-operative pain  -     methocarbamoL (ROBAXIN) 500 MG Tab; Take 1 tablet (500 mg total) by mouth 3 (three) times daily. Frequency could not be confirmed. for 15 days  Dispense: 45 tablet; Refill: 0  -     gabapentin (NEURONTIN) 300 MG capsule; Take 2 capsules (600 mg total) by mouth 3 (three) times a day.  Dispense: 90 capsule; Refill: 0  -     oxyCODONE (ROXICODONE) 5 MG immediate release tablet; Take 1 tablet (5 mg total) by mouth every 8 (eight) hours as needed for Pain.  Dispense: 21 tablet; Refill: 0      Due to nature of the pain, recent trauma and possible infection, will give the patient 7 more days of narcotic.      Patient has follow up with surgery next Friday.    check. No signs of abuse/misuse    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  The current medical regimen is effective;  continue present plan and medications.    Chronic obstructive pulmonary disease, unspecified COPD type  Stable. No complaints at this time.    Essential hypertension  The current medical regimen is effective;  continue present plan and medications.    Hyperlipidemia, unspecified hyperlipidemia type  -     Lipid Panel; Future; Expected date: 05/12/2023  The current medical regimen is effective;  continue present plan and medications.           Follow-up: Follow up in about 1 week (around 5/19/2023) for follow up with surgery.

## 2023-05-15 ENCOUNTER — NURSE TRIAGE (OUTPATIENT)
Dept: ADMINISTRATIVE | Facility: CLINIC | Age: 53
End: 2023-05-15
Payer: MEDICAID

## 2023-05-15 ENCOUNTER — HOSPITAL ENCOUNTER (OUTPATIENT)
Facility: HOSPITAL | Age: 53
Discharge: LEFT BEFORE DISCHARGE | End: 2023-05-16
Attending: EMERGENCY MEDICINE | Admitting: HOSPITALIST
Payer: MEDICAID

## 2023-05-15 DIAGNOSIS — M25.562 LEFT KNEE PAIN: ICD-10-CM

## 2023-05-15 DIAGNOSIS — L03.116 LEFT LEG CELLULITIS: Primary | ICD-10-CM

## 2023-05-15 LAB
ALBUMIN SERPL BCP-MCNC: 3.4 G/DL (ref 3.5–5.2)
ALP SERPL-CCNC: 93 U/L (ref 55–135)
ALT SERPL W/O P-5'-P-CCNC: 10 U/L (ref 10–44)
ANION GAP SERPL CALC-SCNC: 10 MMOL/L (ref 8–16)
AST SERPL-CCNC: 9 U/L (ref 10–40)
BASOPHILS # BLD AUTO: 0.18 K/UL (ref 0–0.2)
BASOPHILS NFR BLD: 1.2 % (ref 0–1.9)
BILIRUB SERPL-MCNC: 0.1 MG/DL (ref 0.1–1)
BUN SERPL-MCNC: 14 MG/DL (ref 6–20)
CALCIUM SERPL-MCNC: 9.4 MG/DL (ref 8.7–10.5)
CHLORIDE SERPL-SCNC: 101 MMOL/L (ref 95–110)
CO2 SERPL-SCNC: 23 MMOL/L (ref 23–29)
CREAT SERPL-MCNC: 0.9 MG/DL (ref 0.5–1.4)
DIFFERENTIAL METHOD: ABNORMAL
EOSINOPHIL # BLD AUTO: 1.2 K/UL (ref 0–0.5)
EOSINOPHIL NFR BLD: 7.4 % (ref 0–8)
ERYTHROCYTE [DISTWIDTH] IN BLOOD BY AUTOMATED COUNT: 18.5 % (ref 11.5–14.5)
EST. GFR  (NO RACE VARIABLE): >60 ML/MIN/1.73 M^2
GLUCOSE SERPL-MCNC: 222 MG/DL (ref 70–110)
HCT VFR BLD AUTO: 28.4 % (ref 37–48.5)
HGB BLD-MCNC: 9 G/DL (ref 12–16)
IMM GRANULOCYTES # BLD AUTO: 0.09 K/UL (ref 0–0.04)
IMM GRANULOCYTES NFR BLD AUTO: 0.6 % (ref 0–0.5)
LYMPHOCYTES # BLD AUTO: 7.2 K/UL (ref 1–4.8)
LYMPHOCYTES NFR BLD: 46.2 % (ref 18–48)
MAGNESIUM SERPL-MCNC: 1.3 MG/DL (ref 1.6–2.6)
MCH RBC QN AUTO: 22.4 PG (ref 27–31)
MCHC RBC AUTO-ENTMCNC: 31.7 G/DL (ref 32–36)
MCV RBC AUTO: 71 FL (ref 82–98)
MONOCYTES # BLD AUTO: 1.5 K/UL (ref 0.3–1)
MONOCYTES NFR BLD: 9.3 % (ref 4–15)
NEUTROPHILS # BLD AUTO: 5.5 K/UL (ref 1.8–7.7)
NEUTROPHILS NFR BLD: 35.3 % (ref 38–73)
NRBC BLD-RTO: 0 /100 WBC
PHOSPHATE SERPL-MCNC: 3.9 MG/DL (ref 2.7–4.5)
PLATELET # BLD AUTO: 722 K/UL (ref 150–450)
PMV BLD AUTO: 9.2 FL (ref 9.2–12.9)
POTASSIUM SERPL-SCNC: 4.5 MMOL/L (ref 3.5–5.1)
PROT SERPL-MCNC: 6.9 G/DL (ref 6–8.4)
RBC # BLD AUTO: 4.01 M/UL (ref 4–5.4)
SODIUM SERPL-SCNC: 134 MMOL/L (ref 136–145)
WBC # BLD AUTO: 15.58 K/UL (ref 3.9–12.7)

## 2023-05-15 PROCEDURE — 80053 COMPREHEN METABOLIC PANEL: CPT | Performed by: EMERGENCY MEDICINE

## 2023-05-15 PROCEDURE — 82803 BLOOD GASES ANY COMBINATION: CPT

## 2023-05-15 PROCEDURE — 99285 EMERGENCY DEPT VISIT HI MDM: CPT | Mod: 25

## 2023-05-15 PROCEDURE — 87040 BLOOD CULTURE FOR BACTERIA: CPT | Mod: 59 | Performed by: EMERGENCY MEDICINE

## 2023-05-15 PROCEDURE — 63600175 PHARM REV CODE 636 W HCPCS: Performed by: EMERGENCY MEDICINE

## 2023-05-15 PROCEDURE — 82962 GLUCOSE BLOOD TEST: CPT

## 2023-05-15 PROCEDURE — 96375 TX/PRO/DX INJ NEW DRUG ADDON: CPT

## 2023-05-15 PROCEDURE — 83605 ASSAY OF LACTIC ACID: CPT

## 2023-05-15 PROCEDURE — 84100 ASSAY OF PHOSPHORUS: CPT | Performed by: EMERGENCY MEDICINE

## 2023-05-15 PROCEDURE — 36600 WITHDRAWAL OF ARTERIAL BLOOD: CPT

## 2023-05-15 PROCEDURE — 99900035 HC TECH TIME PER 15 MIN (STAT)

## 2023-05-15 PROCEDURE — 83735 ASSAY OF MAGNESIUM: CPT | Performed by: EMERGENCY MEDICINE

## 2023-05-15 PROCEDURE — 85025 COMPLETE CBC W/AUTO DIFF WBC: CPT | Performed by: EMERGENCY MEDICINE

## 2023-05-15 RX ORDER — FENTANYL CITRATE 50 UG/ML
50 INJECTION, SOLUTION INTRAMUSCULAR; INTRAVENOUS
Status: COMPLETED | OUTPATIENT
Start: 2023-05-15 | End: 2023-05-15

## 2023-05-15 RX ADMIN — FENTANYL CITRATE 50 MCG: 50 INJECTION, SOLUTION INTRAMUSCULAR; INTRAVENOUS at 11:05

## 2023-05-16 VITALS
SYSTOLIC BLOOD PRESSURE: 135 MMHG | RESPIRATION RATE: 16 BRPM | DIASTOLIC BLOOD PRESSURE: 60 MMHG | BODY MASS INDEX: 32.14 KG/M2 | HEART RATE: 70 BPM | TEMPERATURE: 98 F | OXYGEN SATURATION: 95 % | WEIGHT: 200 LBS | HEIGHT: 66 IN

## 2023-05-16 PROBLEM — M79.2: Status: ACTIVE | Noted: 2023-05-16

## 2023-05-16 LAB
ALLENS TEST: ABNORMAL
BILIRUB UR QL STRIP: NEGATIVE
CLARITY UR: CLEAR
COLOR UR: YELLOW
DELSYS: ABNORMAL
GLUCOSE SERPL-MCNC: 194 MG/DL (ref 70–110)
GLUCOSE UR QL STRIP: NEGATIVE
HCO3 UR-SCNC: 23.9 MMOL/L (ref 24–28)
HGB UR QL STRIP: NEGATIVE
HYALINE CASTS #/AREA URNS LPF: 1 /LPF
KETONES UR QL STRIP: NEGATIVE
LACTATE SERPL-SCNC: 2.9 MMOL/L (ref 0.5–2.2)
LACTATE SERPL-SCNC: 3.6 MMOL/L (ref 0.5–2.2)
LDH SERPL L TO P-CCNC: 4.79 MMOL/L (ref 0.36–1.25)
LEUKOCYTE ESTERASE UR QL STRIP: ABNORMAL
MICROSCOPIC COMMENT: NORMAL
NITRITE UR QL STRIP: NEGATIVE
PCO2 BLDA: 38.9 MMHG (ref 35–45)
PH SMN: 7.4 [PH] (ref 7.35–7.45)
PH UR STRIP: 6 [PH] (ref 5–8)
PO2 BLDA: 86 MMHG (ref 80–100)
POC BE: -1 MMOL/L
POC SATURATED O2: 96 % (ref 95–100)
POC TCO2: 25 MMOL/L (ref 23–27)
POCT GLUCOSE: 140 MG/DL (ref 70–110)
POCT GLUCOSE: 194 MG/DL (ref 70–110)
PROT UR QL STRIP: NEGATIVE
SAMPLE: ABNORMAL
SITE: ABNORMAL
SP GR UR STRIP: 1.01 (ref 1–1.03)
SQUAMOUS #/AREA URNS HPF: 1 /HPF
URN SPEC COLLECT METH UR: ABNORMAL
UROBILINOGEN UR STRIP-ACNC: NEGATIVE EU/DL
WBC #/AREA URNS HPF: 0 /HPF (ref 0–5)

## 2023-05-16 PROCEDURE — 63600175 PHARM REV CODE 636 W HCPCS: Performed by: STUDENT IN AN ORGANIZED HEALTH CARE EDUCATION/TRAINING PROGRAM

## 2023-05-16 PROCEDURE — 83605 ASSAY OF LACTIC ACID: CPT | Performed by: EMERGENCY MEDICINE

## 2023-05-16 PROCEDURE — 99223 1ST HOSP IP/OBS HIGH 75: CPT | Mod: 24,,, | Performed by: SURGERY

## 2023-05-16 PROCEDURE — 96365 THER/PROPH/DIAG IV INF INIT: CPT

## 2023-05-16 PROCEDURE — 96367 TX/PROPH/DG ADDL SEQ IV INF: CPT

## 2023-05-16 PROCEDURE — 83605 ASSAY OF LACTIC ACID: CPT | Mod: 91 | Performed by: NURSE PRACTITIONER

## 2023-05-16 PROCEDURE — G0378 HOSPITAL OBSERVATION PER HR: HCPCS

## 2023-05-16 PROCEDURE — 25000003 PHARM REV CODE 250: Performed by: STUDENT IN AN ORGANIZED HEALTH CARE EDUCATION/TRAINING PROGRAM

## 2023-05-16 PROCEDURE — 99223 PR INITIAL HOSPITAL CARE,LEVL III: ICD-10-PCS | Mod: 24,,, | Performed by: SURGERY

## 2023-05-16 PROCEDURE — 25000003 PHARM REV CODE 250: Performed by: NURSE PRACTITIONER

## 2023-05-16 PROCEDURE — 81000 URINALYSIS NONAUTO W/SCOPE: CPT | Performed by: EMERGENCY MEDICINE

## 2023-05-16 PROCEDURE — 96375 TX/PRO/DX INJ NEW DRUG ADDON: CPT

## 2023-05-16 PROCEDURE — 96366 THER/PROPH/DIAG IV INF ADDON: CPT

## 2023-05-16 RX ORDER — IBUPROFEN 200 MG
16 TABLET ORAL
Status: DISCONTINUED | OUTPATIENT
Start: 2023-05-16 | End: 2023-05-16 | Stop reason: HOSPADM

## 2023-05-16 RX ORDER — MAG HYDROX/ALUMINUM HYD/SIMETH 200-200-20
30 SUSPENSION, ORAL (FINAL DOSE FORM) ORAL EVERY 6 HOURS PRN
Status: DISCONTINUED | OUTPATIENT
Start: 2023-05-16 | End: 2023-05-16 | Stop reason: HOSPADM

## 2023-05-16 RX ORDER — INSULIN ASPART 100 [IU]/ML
0-5 INJECTION, SOLUTION INTRAVENOUS; SUBCUTANEOUS
Status: DISCONTINUED | OUTPATIENT
Start: 2023-05-16 | End: 2023-05-16 | Stop reason: HOSPADM

## 2023-05-16 RX ORDER — TALC
6 POWDER (GRAM) TOPICAL NIGHTLY PRN
Status: DISCONTINUED | OUTPATIENT
Start: 2023-05-16 | End: 2023-05-16 | Stop reason: HOSPADM

## 2023-05-16 RX ORDER — ACETAMINOPHEN 325 MG/1
650 TABLET ORAL EVERY 6 HOURS PRN
Status: DISCONTINUED | OUTPATIENT
Start: 2023-05-16 | End: 2023-05-16 | Stop reason: HOSPADM

## 2023-05-16 RX ORDER — RISPERIDONE 1 MG/1
3 TABLET ORAL 2 TIMES DAILY
Status: DISCONTINUED | OUTPATIENT
Start: 2023-05-16 | End: 2023-05-16 | Stop reason: HOSPADM

## 2023-05-16 RX ORDER — ONDANSETRON 2 MG/ML
4 INJECTION INTRAMUSCULAR; INTRAVENOUS EVERY 6 HOURS PRN
Status: DISCONTINUED | OUTPATIENT
Start: 2023-05-16 | End: 2023-05-16 | Stop reason: HOSPADM

## 2023-05-16 RX ORDER — BUMETANIDE 1 MG/1
1 TABLET ORAL DAILY
Status: DISCONTINUED | OUTPATIENT
Start: 2023-05-16 | End: 2023-05-16 | Stop reason: HOSPADM

## 2023-05-16 RX ORDER — IBUPROFEN 200 MG
1 TABLET ORAL DAILY
Status: DISCONTINUED | OUTPATIENT
Start: 2023-05-16 | End: 2023-05-16 | Stop reason: HOSPADM

## 2023-05-16 RX ORDER — GABAPENTIN 300 MG/1
600 CAPSULE ORAL 3 TIMES DAILY
Status: DISCONTINUED | OUTPATIENT
Start: 2023-05-16 | End: 2023-05-16 | Stop reason: HOSPADM

## 2023-05-16 RX ORDER — PANTOPRAZOLE SODIUM 40 MG/1
40 TABLET, DELAYED RELEASE ORAL DAILY
Status: DISCONTINUED | OUTPATIENT
Start: 2023-05-16 | End: 2023-05-16 | Stop reason: HOSPADM

## 2023-05-16 RX ORDER — OXYCODONE AND ACETAMINOPHEN 5; 325 MG/1; MG/1
1 TABLET ORAL EVERY 6 HOURS PRN
Status: DISCONTINUED | OUTPATIENT
Start: 2023-05-16 | End: 2023-05-16 | Stop reason: HOSPADM

## 2023-05-16 RX ORDER — ACETAMINOPHEN 500 MG
1000 TABLET ORAL
Status: COMPLETED | OUTPATIENT
Start: 2023-05-16 | End: 2023-05-16

## 2023-05-16 RX ORDER — METOPROLOL TARTRATE 25 MG/1
25 TABLET, FILM COATED ORAL 2 TIMES DAILY
Status: DISCONTINUED | OUTPATIENT
Start: 2023-05-16 | End: 2023-05-16 | Stop reason: HOSPADM

## 2023-05-16 RX ORDER — METHOCARBAMOL 500 MG/1
500 TABLET, FILM COATED ORAL 3 TIMES DAILY
Status: DISCONTINUED | OUTPATIENT
Start: 2023-05-16 | End: 2023-05-16 | Stop reason: HOSPADM

## 2023-05-16 RX ORDER — ONDANSETRON 2 MG/ML
4 INJECTION INTRAMUSCULAR; INTRAVENOUS
Status: COMPLETED | OUTPATIENT
Start: 2023-05-16 | End: 2023-05-16

## 2023-05-16 RX ORDER — SODIUM CHLORIDE 9 MG/ML
INJECTION, SOLUTION INTRAVENOUS CONTINUOUS
Status: DISCONTINUED | OUTPATIENT
Start: 2023-05-16 | End: 2023-05-16 | Stop reason: HOSPADM

## 2023-05-16 RX ORDER — IBUPROFEN 200 MG
24 TABLET ORAL
Status: DISCONTINUED | OUTPATIENT
Start: 2023-05-16 | End: 2023-05-16 | Stop reason: HOSPADM

## 2023-05-16 RX ORDER — GLUCAGON 1 MG
1 KIT INJECTION
Status: DISCONTINUED | OUTPATIENT
Start: 2023-05-16 | End: 2023-05-16 | Stop reason: HOSPADM

## 2023-05-16 RX ORDER — NAPROXEN SODIUM 220 MG/1
81 TABLET, FILM COATED ORAL DAILY
Status: DISCONTINUED | OUTPATIENT
Start: 2023-05-16 | End: 2023-05-16 | Stop reason: HOSPADM

## 2023-05-16 RX ORDER — LIDOCAINE 50 MG/G
1 PATCH TOPICAL
Status: DISCONTINUED | OUTPATIENT
Start: 2023-05-16 | End: 2023-05-16 | Stop reason: HOSPADM

## 2023-05-16 RX ORDER — CEFTRIAXONE 2 G/50ML
2 INJECTION, SOLUTION INTRAVENOUS
Status: DISCONTINUED | OUTPATIENT
Start: 2023-05-17 | End: 2023-05-16 | Stop reason: HOSPADM

## 2023-05-16 RX ADMIN — OXYCODONE AND ACETAMINOPHEN 1 TABLET: 5; 325 TABLET ORAL at 08:05

## 2023-05-16 RX ADMIN — LIDOCAINE 1 PATCH: 50 PATCH TOPICAL at 09:05

## 2023-05-16 RX ADMIN — VANCOMYCIN HYDROCHLORIDE 2250 MG: 500 INJECTION, POWDER, LYOPHILIZED, FOR SOLUTION INTRAVENOUS at 12:05

## 2023-05-16 RX ADMIN — ONDANSETRON 4 MG: 2 INJECTION INTRAMUSCULAR; INTRAVENOUS at 12:05

## 2023-05-16 RX ADMIN — APIXABAN 5 MG: 5 TABLET, FILM COATED ORAL at 09:05

## 2023-05-16 RX ADMIN — ASPIRIN 81 MG 81 MG: 81 TABLET ORAL at 09:05

## 2023-05-16 RX ADMIN — METOPROLOL TARTRATE 25 MG: 25 TABLET, FILM COATED ORAL at 09:05

## 2023-05-16 RX ADMIN — SODIUM CHLORIDE 1000 ML: 9 INJECTION, SOLUTION INTRAVENOUS at 12:05

## 2023-05-16 RX ADMIN — ACETAMINOPHEN 650 MG: 325 TABLET ORAL at 06:05

## 2023-05-16 RX ADMIN — SODIUM CHLORIDE: 9 INJECTION, SOLUTION INTRAVENOUS at 04:05

## 2023-05-16 RX ADMIN — ACETAMINOPHEN 1000 MG: 500 TABLET ORAL at 12:05

## 2023-05-16 RX ADMIN — RISPERIDONE 3 MG: 1 TABLET ORAL at 09:05

## 2023-05-16 RX ADMIN — CEFTRIAXONE 1 G: 1 INJECTION, SOLUTION INTRAVENOUS at 12:05

## 2023-05-16 RX ADMIN — PANTOPRAZOLE SODIUM 40 MG: 40 TABLET, DELAYED RELEASE ORAL at 09:05

## 2023-05-16 NOTE — TELEPHONE ENCOUNTER
Spoke with patient who is s/p below the knee amputation (left leg) on 4/19.  Patient states she has phantom pain in the extremity where her leg was removed.  She states pain is like a shocking pulsating pain. She also reports having pain behind the stump and in the bend of her knee. Patient states her pain is similar to when she had a blood clot before.  Patient has been taking oxycodone 5 mg, gabapentin 600 mg, and methocarbamol 500 mg which has not provided relief.  Spoke with on call provider Dr. Chavez who states if pain is new and patient suspects blood clot she should go to ED.  Patient states pain is new and she has a history of blood clots.  Advised ED and patient verbalized understanding.  Also advised patient to call [id number] for transport if she cannot find transportation.      Reason for Disposition   [1] SEVERE post-op pain (e.g., excruciating, pain scale 8-10) AND [2] not controlled with pain medications    Additional Information   Negative: Sounds like a life-threatening emergency to the triager   Negative: [1] Widespread rash AND [2] bright red, sunburn-like   Negative: [1] SEVERE headache AND [2] after spinal (epidural) anesthesia   Negative: [1] Vomiting AND [2] persists > 4 hours   Negative: [1] Vomiting AND [2] abdomen looks much more swollen than usual   Negative: [1] Drinking very little AND [2] dehydration suspected (e.g., no urine > 12 hours, very dry mouth, very lightheaded)   Negative: Patient sounds very sick or weak to the triager   Negative: Sounds like a serious complication to the triager   Negative: Fever > 100.4 F (38.0 C)    Protocols used: Post-Op Symptoms and [id number]

## 2023-05-16 NOTE — ASSESSMENT & PLAN NOTE
No obvious erythema or edema noted at well healing stump.   Pt eloped prior to re-evaluation  Evaluated by surgery    -continue bactrim per outpatient surgery and f/u appointment on 5/18

## 2023-05-16 NOTE — HPI
52F with pmh of COPD, CAD, DM, presenting with left stump pain at amputation site.  Patient notes pain is become increasingly more painful and associated with spasming. Pt states the pain started in her knee area and radiated down like it was tingling in her foot which is not present. Pt states the pain would come intermittently and treated with her gabapentin, but this night the pain remained constant so she came for evaluation. Pt denies fever, chills, n/v, cp, sob, vision changes, warmth to the leg, worsening redness of the area or increased drainage. Pt has an appointment to see her surgeon on 5/18. Pt started on abx in the ed for possible cellulitis. Pt initially evaluated in ed and surgery consulted who saw pt and did not note any signs of infection or need for surgical intervention. On attempts to re-evaluate the pt and discuss disposition pt had eloped from the ED. Nursing had already removed pt IV, but she was told that she needed to be seen by her physician, but left prior.

## 2023-05-16 NOTE — ED NOTES
Pt denies having to urinate at this time. Made aware of need of urine specimen, verbalized understanding and will utilize call light when she can provide a urine sample.

## 2023-05-16 NOTE — PLAN OF CARE
Response received from Dr. Sutton (via secure chat) advising that she didn't think patient would need wound care at home.  MELVI f/u with Rosemary @ [facility] to advise.

## 2023-05-16 NOTE — ASSESSMENT & PLAN NOTE
Patient's FSGs are controlled on current medication regimen.  Last A1c reviewed-   Lab Results   Component Value Date    HGBA1C 7.4 (H) 03/15/2023     Most recent fingerstick glucose reviewed-   Recent Labs   Lab 05/16/23  0232 05/16/23  0824   POCTGLUCOSE 194* 140*     Current correctional scale  Low  Maintain anti-hyperglycemic dose as follows-   Antihyperglycemics (From admission, onward)    Start     Stop Route Frequency Ordered    05/16/23 0322  insulin aspart U-100 pen 0-5 Units         -- SubQ Before meals & nightly PRN 05/16/23 0223        Hold Oral hypoglycemics while patient is in the hospital.

## 2023-05-16 NOTE — ED NOTES
Handoff received from DANK Giles.   Pt is resting in bed with eyes closed, RR is even and unlabored, chest is rising and falling equally, VS WDL, pt woke up momentarily when nurse entered the room.  Pt is in no apparent distress at this time.

## 2023-05-16 NOTE — PROGRESS NOTES
Pharmacokinetic Initial Assessment: IV Vancomycin    Assessment/Plan:    Initiate intravenous vancomycin with loading dose of 2250 mg once followed by a maintenance dose of vancomycin 1250 mg IV every 12 hours  Desired empiric serum trough concentration is 10 to 20 mcg/mL  Draw vancomycin trough level 60 min prior to fourth dose on 5/17/23 at approximately 12:00  Pharmacy will continue to follow and monitor vancomycin.      Please contact pharmacy at extension [phone] with any questions regarding this assessment.     Thank you for the consult,   Imani Arredondo       Patient brief summary:  Audrey Natarajan is a 52 y.o. female initiated on antimicrobial therapy with IV Vancomycin for treatment of suspected skin & soft tissue infection    Drug Allergies:   Review of patient's allergies indicates:   Allergen Reactions    Morphine Other (See Comments)     Patient had a psychotic episode after taking Morphine  Agitation, hallucinations  Other Reaction(s): Other (See Comments), Other (See Comments)    Patient had a psychotic episode after taking Morphine    Patient had a psychotic episode after taking Morphine Agitation, hallucinations    Penicillins Anaphylaxis     Tolerated cephalosporins in the past    Januvia [sitagliptin] Hives    Carbamazepine Other (See Comments)     hyponatremia  Other Reaction(s): Other (See Comments)    hyponatremia       Actual Body Weight:   90.7 kg    Renal Function:   Estimated Creatinine Clearance: 84.9 mL/min (based on SCr of 0.9 mg/dL).,     Dialysis Method (if applicable):  N/A    CBC (last 72 hours):  Recent Labs   Lab Result Units 05/15/23  2312   WBC K/uL 15.58*   Hemoglobin g/dL 9.0*   Hematocrit % 28.4*   Platelets K/uL 722*   Gran % % 35.3*   Lymph % % 46.2   Mono % % 9.3   Eosinophil % % 7.4   Basophil % % 1.2   Differential Method  Automated       Metabolic Panel (last 72 hours):  Recent Labs   Lab Result Units 05/15/23  2312   Sodium mmol/L 134*   Potassium mmol/L 4.5    Chloride mmol/L 101   CO2 mmol/L 23   Glucose mg/dL 222*   BUN mg/dL 14   Creatinine mg/dL 0.9   Albumin g/dL 3.4*   Total Bilirubin mg/dL 0.1   Alkaline Phosphatase U/L 93   AST U/L 9*   ALT U/L 10   Magnesium mg/dL 1.3*   Phosphorus mg/dL 3.9       Drug levels (last 3 results):  No results for input(s): VANCOMYCINRA, VANCORANDOM, VANCOMYCINPE, VANCOPEAK, VANCOMYCINTR, VANCOTROUGH in the last 72 hours.    Microbiologic Results:  Microbiology Results (last 7 days)       Procedure Component Value Units Date/Time    Blood culture x two cultures. Draw prior to antibiotics. [[id number]] Collected: 05/15/23 2321    Order Status: Sent Specimen: Blood from Peripheral, Forearm, Left Updated: 05/15/23 2325    Blood culture x two cultures. Draw prior to antibiotics. [[id number]] Collected: 05/15/23 2313    Order Status: Sent Specimen: Blood from Peripheral, Antecubital, Left Updated: 05/15/23 2325

## 2023-05-16 NOTE — ED NOTES
Pt incision cleaned and redressed. IV removed.   Pt aware she is just waiting on clearance from surgery for the MD to discharge her.

## 2023-05-16 NOTE — H&P
[facility] [place] [facility] Medicine  History & Physical    Patient Name: Audrey Natarajan  MRN: [id number]  Patient Class: OP- Observation  Admission Date: 5/15/2023  Attending Physician: Tu Avendaño III, MD   Primary Care Provider: Donaldo Pena MD         Patient information was obtained from patient and ER records.     Subjective:     Principal Problem:Stump neuralgia    Chief Complaint:   Chief Complaint   Patient presents with    Leg Pain     Patient arrives via EMS with left leg pain at amputation site. Amputation last month at [facility]. Site began hurting tonight after taking meds. Some bloody drainage noted to bandage, patient reports that this has been ongoing since the surgery. Denied purulent drainage.        HPI: 52F with pmh of COPD, CAD, DM, presenting with left stump pain at amputation site.  Patient notes pain is become increasingly more painful and associated with spasming. Pt states the pain started in her knee area and radiated down like it was tingling in her foot which is not present. Pt states the pain would come intermittently and treated with her gabapentin, but this night the pain remained constant so she came for evaluation. Pt denies fever, chills, n/v, cp, sob, vision changes, warmth to the leg, worsening redness of the area or increased drainage. Pt has an appointment to see her surgeon on 5/18. Pt started on abx in the ed for possible cellulitis. Pt initially evaluated in ed and surgery consulted who saw pt and did not note any signs of infection or need for surgical intervention. On attempts to re-evaluate the pt and discuss disposition pt had eloped from the ED. Nursing had already removed pt IV, but she was told that she needed to be seen by her physician, but left prior. Pt is on bactrim at home and should continue to take this as prescribed by her surgeon      Past Medical History:   Diagnosis Date    ADHD (attention deficit hyperactivity disorder)     Arthritis      "Asthma     Bipolar 1 disorder     Cataract     Cigarette smoker     COPD (chronic obstructive pulmonary disease)     Coronary artery disease     A fib    Depression     bipolar manic depresson    Diabetes mellitus     Diabetic foot ulcers     Diabetic neuropathy     DVT of lower extremity, bilateral 07/2013    bilateral LE DVT. [place] filter placed.     Encounter for blood transfusion     History of blood clots 1. Left Leg=2003; 2.Bilateral Groin=Blood Clots= 5 or 6/ 2013 & 7/2013; 3. LLL of Lung=7/2013;  4. Lt. Lower Leg=7/2013.     Pt. had 1st Blood Clot after [id number]=7426, & Last=2013. [place] Filter= Rt.Lateral Neck.    HTN (hypertension) 06/06/2013    Pt states that she does not have hypertension    Hypercholesteremia     Irregular heartbeat     Neuromuscular disorder     neuropathy feet    Obese     PE (pulmonary embolism) 07/2013    bilat LE DVT.     Restless leg syndrome        Past Surgical History:   Procedure Laterality Date    ABDOMINAL SURGERY  2010    gastric sleeve    BELOW KNEE AMPUTATION OF LOWER EXTREMITY Left 4/19/2023    Procedure: AMPUTATION, BELOW KNEE;  Surgeon: Gabe Munoz MD;  Location: [facility] OR;  Service: General;  Laterality: Left;  RN PREOP 4/11/2023    BILATERAL OOPHORECTOMY Bilateral 1/12/2015    CHOLECYSTECTOMY      DEBRIDEMENT OF FOOT Bilateral 5/10/2022    Procedure: DEBRIDEMENT, FOOT;  Surgeon: Maira De Los Santos DPM;  Location: [facility] OR;  Service: Podiatry;  Laterality: Bilateral;    DEBRIDEMENT OF FOOT Left 2/28/2023    Procedure: DEBRIDEMENT, FOOT,biopsy;  Surgeon: Maira De Los Santos DPM;  Location: [facility] OR;  Service: Podiatry;  Laterality: Left;  request misonix, wound VAC, possible neoxx    Green' s filter Right 7/4/2012    Right Neck & Tunneled Down.    HERNIA REPAIR      "[place] of Hernias Repaires around th Belly Button.", pt. states    INCISION AND DRAINAGE FOOT Left 12/24/2022    Procedure: INCISION AND DRAINAGE, FOOT;  Surgeon: Fahad Razo DPM;  Location: [facility] OR; "  Service: Podiatry;  Laterality: Left;    LAPAROSCOPIC CHOLECYSTECTOMY N/A 9/10/2020    Procedure: CHOLECYSTECTOMY, LAPAROSCOPIC;  Surgeon: Montrell Gutierrez MD;  Location: [facility];  Service: General;  Laterality: N/A;  RN PREOP 9/9----COVID Negative  9/9    OVARIAN CYST REMOVAL  3/13/2014    HI REMOVAL OF OVARY/TUBE(S)      SPLENECTOMY, TOTAL  July 2003    TONSILLECTOMY      as a child    TYMPANOSTOMY TUBE PLACEMENT  1976    VEIN SURGERY  2003    Lt leg       Review of patient's allergies indicates:   Allergen Reactions    Morphine Other (See Comments)     Patient had a psychotic episode after taking Morphine  Agitation, hallucinations  Other Reaction(s): Other (See Comments), Other (See Comments)    Patient had a psychotic episode after taking Morphine    Patient had a psychotic episode after taking Morphine Agitation, hallucinations    Penicillins Anaphylaxis     Tolerated cephalosporins in the past    Januvia [sitagliptin] Hives    Carbamazepine Other (See Comments)     hyponatremia  Other Reaction(s): Other (See Comments)    hyponatremia       Current Facility-Administered Medications on File Prior to Encounter   Medication    [DISCONTINUED] GENERIC EXTERNAL MEDICATION    [DISCONTINUED] GENERIC EXTERNAL MEDICATION     Current Outpatient Medications on File Prior to Encounter   Medication Sig    albuterol (PROVENTIL/VENTOLIN HFA) 90 mcg/actuation inhaler INHALE 2 PUFFS INTO THE LUNGS EVERY 6 HOURS AS NEEDED FOR WHEEZING. RESCUE    albuterol-ipratropium (DUO-NEB) 2.5 mg-0.5 mg/3 mL nebulizer solution Take 3 mLs by nebulization every 6 (six) hours as needed for Wheezing or Shortness of Breath. Rescue    ammonium lactate (LAC-HYDRIN) 12 % lotion APPL Y ONCE TOPICALLY TWICE DAILY FOR 30 DAYS    apixaban (ELIQUIS) 5 mg Tab Take 1 tablet (5 mg total) by mouth in the morning and 1 tablet (5 mg total) in the evening. Indications: Thromboembolism secondary to Atrial Fibrillation.    aspirin 81 MG Chew Take 1 tablet (81  mg total) by mouth once daily.    bumetanide (BUMEX) 1 MG tablet Take 1 tablet (1 mg total) by mouth once daily.    cyanocobalamin (VITAMIN B-12) 1000 MCG tablet Take 100 mcg by mouth once daily.    divalproex (DEPAKOTE) 500 MG TbEC Take 1 tablet by mouth every morning    DUPIXENT  mg/2 mL PnIj Inject into the skin every 14 (fourteen) days.    fluticasone propionate (FLONASE) 50 mcg/actuation nasal spray 2 sprays (100 mcg total) by Each Nostril route daily as needed (Nasal congestion).    fluticasone-salmeterol diskus inhaler 250-50 mcg Inhale 1 puff into the lungs 2 (two) times daily. Controller    gabapentin (NEURONTIN) 300 MG capsule Take 2 capsules (600 mg total) by mouth 3 (three) times a day.    hydrOXYzine (ATARAX) 50 MG tablet Take 0.5 tablets (25 mg total) by mouth 4 (four) times daily as needed for Itching or Anxiety.    LIDOcaine (LIDODERM) 5 % Place 1 patch onto the skin once daily. Remove & Discard patch within 12 hours or as directed by MD    lisinopriL 10 MG tablet TAKE 1 TABLET(10 MG) BY MOUTH EVERY DAY    metFORMIN (GLUCOPHAGE) 1000 MG tablet Take 1 tablet (1,000 mg total) by mouth 2 (two) times daily with meals.    methocarbamoL (ROBAXIN) 500 MG Tab Take 1 tablet (500 mg total) by mouth 3 (three) times daily. Frequency could not be confirmed. for 15 days    oxyCODONE (ROXICODONE) 5 MG immediate release tablet Take 1 tablet (5 mg total) by mouth every 8 (eight) hours as needed for Pain.    BIOFREEZE, MENTHOL, TOP Apply topically.    divalproex (DEPAKOTE) 500 MG TbEC Take 1 tablet (500 mg total) by mouth once daily. PO QAM    ferrous sulfate 325 (65 FE) MG EC tablet Take 1 tablet (325 mg total) by mouth once daily. (Patient not taking: Reported on 5/16/2023)    hydrOXYzine HCL (ATARAX) 25 MG tablet Take 1 tablet (25 mg total) by mouth every 6 (six) hours as needed for itching or anxiety.    insulin glargine 100 units/mL SubQ pen Inject 10 Units under the skin every morning Indications: Type 2  "Diabetes.    insulin lispro 100 unit/mL pen Inject 0-16 Units under the skin 4 (four) times a day before meals and nightly Indications: High Blood Sugar. 151-200 4 units 201-250 8 units 251-300 10 units 301-350 12 units 351-400 16 units >400 16 units & Call Medical Provider.    loratadine (CLARITIN) 10 mg tablet Take 1 tablet (10 mg total) by mouth once daily.    metoprolol tartrate (LOPRESSOR) 25 MG tablet Take 1 tablet (25 mg total) by mouth 2 (two) times daily.    multivitamin Tab Take 1 tablet by mouth once daily.    nicotine (NICODERM CQ) 14 mg/24 hr Place 1 patch onto the skin once daily. (Patient not taking: Reported on 5/12/2023)    nystatin (NYSTOP) powder APPLY TO ABDOMINAL AND BREAST SKIN FOLD TWICE DAILY.    ondansetron (ZOFRAN) 8 MG tablet Take 1 tablet (8 mg total) by mouth every 8 (eight) hours as needed for Nausea.    pantoprazole (PROTONIX) 40 MG tablet Take 1 tablet (40 mg total) by mouth once daily.    pantoprazole (PROTONIX) 40 MG tablet Take 1 tablet (40 mg total) by mouth Daily before breakfast.    pen needle, diabetic (BD ERIC 2ND GEN PEN NEEDLE) 32 gauge x 5/32" Ndle Use with insulin 5 times daily    polyethylene glycol (GLYCOLAX) 17 gram/dose powder Mix 1 capful (17 g) with fluids and drink by mouth once daily.    pravastatin (PRAVACHOL) 40 MG tablet Take 1 tablet (40 mg total) by mouth every evening.    risperiDONE (RISPERDAL) 3 MG Tab Take 1 tablet (3 mg total) by mouth in the morning and 1 tablet (3 mg total) in the evening. Indications: Mood.    semaglutide (OZEMPIC SUBQ) Inject into the skin.    senna-docusate 8.6-50 mg (PERICOLACE) 8.6-50 mg per tablet Take 1 tablet by mouth once daily.    sulfamethoxazole-trimethoprim 800-160mg (BACTRIM DS) 800-160 mg Tab Take 1 tablet by mouth 2 (two) times daily. for 7 days    traMADoL (ULTRAM) 50 mg tablet Take 1 tablet (50 mg total) by mouth every 8 (eight) hours as needed for Pain (foot pain).    vitamin E 1000 UNIT capsule Take 1,000 Units by " mouth once daily.    VYVANSE 40 mg Cap Take 40 mg by mouth once daily.    [DISCONTINUED] ASPERCREME, LIDOCAINE, 4 % PtMd Apply 1 patch topically.    [DISCONTINUED] bumetanide (BUMEX) 1 MG tablet Take 1 tablet (1 mg total) by mouth in the morning.    [DISCONTINUED] diclofenac sodium (VOLTAREN) 1 % Gel Apply 2 g topically 4 (four) times daily as needed (Apply to painful area up to 4 times a day as needed for pain). Apply to painful area 4 times a day as needed for pain    [DISCONTINUED] furosemide (LASIX) 20 MG tablet TAKE 1 TABLET(20 MG) BY MOUTH EVERY DAY    [DISCONTINUED] QUEtiapine (SEROQUEL) 200 MG Tab Take 1 tablet (200 mg total) by mouth before breakfast.     Family History       Problem Relation (Age of Onset)    Cataracts Father    Diabetes Father, Paternal Grandfather    Heart disease Father, Paternal Grandfather    Hypertension Father    No Known Problems Mother, Sister, Brother, Maternal Aunt, Maternal Uncle, Paternal Aunt, Paternal Uncle, Maternal Grandfather    Ovarian cancer Maternal Grandmother, Paternal Grandmother          Tobacco Use    Smoking status: Every Day     Packs/day: 0.50     Years: 37.00     Pack years: 18.50     Types: Cigarettes     Last attempt to quit: 2020     Years since quittin.4    Smokeless tobacco: Current    Tobacco comments:     Enrolled in the [employer] on 5/3/14 ([facility] Member ID # [id number]). Ambulatory referral to Smoking Cessation Program   Substance and Sexual Activity    Alcohol use: No     Alcohol/week: 0.0 standard drinks    Drug use: No    Sexual activity: Yes     Partners: Male     Review of Systems  Objective:     Vital Signs (Most Recent):  Temp: 98.2 °F (36.8 °C) (23)  Pulse: 70 (23)  Resp: 16 (23)  BP: 135/60 (23 0945)  SpO2: 95 % (23) Vital Signs (24h Range):  Temp:  [97.9 °F (36.6 °C)-98.5 °F (36.9 °C)] 98.2 °F (36.8 °C)  Pulse:  [65-84] 70  Resp:  [14-18] 16  SpO2:  [93 %-98 %] 95 %  BP:  ()/(51-80) 135/60     Weight: 90.7 kg (200 lb)  Body mass index is 32.28 kg/m².     Physical Exam  Vitals reviewed.   Constitutional:       General: She is not in acute distress.     Appearance: She is not toxic-appearing.   HENT:      Head: Normocephalic and atraumatic.      Mouth/Throat:      Mouth: Mucous membranes are moist.      Pharynx: Oropharynx is clear.   Eyes:      General: No scleral icterus.     Extraocular Movements: Extraocular movements intact.   Cardiovascular:      Rate and Rhythm: Normal rate and regular rhythm.   Pulmonary:      Effort: Pulmonary effort is normal. No respiratory distress.   Abdominal:      General: There is no distension.      Palpations: Abdomen is soft.      Tenderness: There is no abdominal tenderness. There is no guarding or rebound.   Musculoskeletal:      Cervical back: Neck supple. No rigidity.      Comments: See pictures in surgery note for further imaging of stump, but grossly well healing left with staples in place. No obvious fluctuance or erythema noted. No drainage on palpitation. No pain with palpitation noted.     Skin:     General: Skin is warm and dry.   Neurological:      General: No focal deficit present.      Mental Status: She is alert and oriented to person, place, and time.   Psychiatric:         Mood and Affect: Mood normal.         Behavior: Behavior normal.              Significant Labs: All pertinent labs within the past 24 hours have been reviewed.    Significant Imaging: I have reviewed all pertinent imaging results/findings within the past 24 hours.    Assessment/Plan:     * Stump neuralgia  Pt sxs appear most likely related to neuropathic pain at recent BKA site.  Pt is on gabapentin, robaxin, and oxycodone at home for sxs    -continue these on disposition. Pt advised she can increase oxycodone from 5 to 10 if pain is worse  -f/u with pcp and surgeon      Chronic deep vein thrombosis (DVT) of both lower extremities  Continue eliquis      Long  term (current) use of anticoagulants  Continue eliquis      Cellulitis of left lower extremity  No obvious erythema or edema noted at well healing stump.   Pt eloped prior to re-evaluation  Evaluated by surgery    -continue bactrim per outpatient surgery and f/u appointment on 5/18      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  Patient's FSGs are controlled on current medication regimen.  Last A1c reviewed-   Lab Results   Component Value Date    HGBA1C 7.4 (H) 03/15/2023     Most recent fingerstick glucose reviewed-   Recent Labs   Lab 05/16/23  0232 05/16/23  0824   POCTGLUCOSE 194* 140*     Current correctional scale  Low  Maintain anti-hyperglycemic dose as follows-   Antihyperglycemics (From admission, onward)      Start     Stop Route Frequency Ordered    05/16/23 0322  insulin aspart U-100 pen 0-5 Units         -- SubQ Before meals & nightly PRN 05/16/23 0223          Hold Oral hypoglycemics while patient is in the hospital.    Hyperlipidemia    statin      VTE Risk Mitigation (From admission, onward)           Ordered     apixaban tablet 5 mg  2 times daily         05/16/23 0905                       On 05/16/2023, patient should be placed in hospital observation services under my care.        Tu Avendaño III, MD  Department of Hospital Medicine  [facility] [place] [facility]

## 2023-05-16 NOTE — MEDICAL/APP STUDENT
Patient Name: Audrey Natarajan  YOB: 1970 (52 y.o.)  MRN: [id number]  Today's Date: 05/16/2023      Inpatient consult to General Surgery  Consult performed by: Kristin Sutton MD  Consult ordered by:  ***    SUBJECTIVE:     Chief Complaint: Cellulitis    History of Present Illness:  Audery Natarajan is a 52 y.o. female with PMHx including COPD, DVT, DM, and L BKA 4/19/23 with Dr. Munoz, who is presenting for evaluation of pain at amputation site. Patient notes increasing pain and redness with bloody drainage. She denies fever, chills, or other acute complaints.       Review of patient's allergies indicates:   Allergen Reactions    Morphine Other (See Comments)     Patient had a psychotic episode after taking Morphine  Agitation, hallucinations  Other Reaction(s): Other (See Comments), Other (See Comments)    Patient had a psychotic episode after taking Morphine    Patient had a psychotic episode after taking Morphine Agitation, hallucinations    Penicillins Anaphylaxis     Tolerated cephalosporins in the past    Januvia [sitagliptin] Hives    Carbamazepine Other (See Comments)     hyponatremia  Other Reaction(s): Other (See Comments)    hyponatremia       Past Medical History:   Diagnosis Date    ADHD (attention deficit hyperactivity disorder)     Arthritis     Asthma     Bipolar 1 disorder     Cataract     Cigarette smoker     COPD (chronic obstructive pulmonary disease)     Coronary artery disease     A fib    Depression     bipolar manic depresson    Diabetes mellitus     Diabetic foot ulcers     Diabetic neuropathy     DVT of lower extremity, bilateral 07/2013    bilateral LE DVT. [place] filter placed.     Encounter for blood transfusion     History of blood clots 1. Left Leg=2003; 2.Bilateral Groin=Blood Clots= 5 or 6/ 2013 & 7/2013; 3. LLL of Lung=7/2013;  4. Lt. Lower Leg=7/2013.     Pt. had 1st Blood Clot after Ryobeylhdgjr=3396, & Last=2013. [place] Filter= Rt.Lateral  "Neck.    HTN (hypertension) 06/06/2013    Pt states that she does not have hypertension    Hypercholesteremia     Irregular heartbeat     Neuromuscular disorder     neuropathy feet    Obese     PE (pulmonary embolism) 07/2013    bilat LE DVT.     Restless leg syndrome      Past Surgical History:   Procedure Laterality Date    ABDOMINAL SURGERY  2010    gastric sleeve    BELOW KNEE AMPUTATION OF LOWER EXTREMITY Left 4/19/2023    Procedure: AMPUTATION, BELOW KNEE;  Surgeon: Gabe Munoz MD;  Location: [facility] OR;  Service: General;  Laterality: Left;  RN PREOP 4/11/2023    BILATERAL OOPHORECTOMY Bilateral 1/12/2015    CHOLECYSTECTOMY      DEBRIDEMENT OF FOOT Bilateral 5/10/2022    Procedure: DEBRIDEMENT, FOOT;  Surgeon: Maira De Los Santos DPM;  Location: [facility] OR;  Service: Podiatry;  Laterality: Bilateral;    DEBRIDEMENT OF FOOT Left 2/28/2023    Procedure: DEBRIDEMENT, FOOT,biopsy;  Surgeon: Maira De Los Santos DPM;  Location: [facility] OR;  Service: Podiatry;  Laterality: Left;  request misonix, wound VAC, possible neoxx    Green' s filter Right 7/4/2012    Right Neck & Tunneled Down.    HERNIA REPAIR      "[place] of Hernias Repaires around th Belly Button.", pt. states    INCISION AND DRAINAGE FOOT Left 12/24/2022    Procedure: INCISION AND DRAINAGE, FOOT;  Surgeon: Fahad Razo DPM;  Location: [facility] OR;  Service: Podiatry;  Laterality: Left;    LAPAROSCOPIC CHOLECYSTECTOMY N/A 9/10/2020    Procedure: CHOLECYSTECTOMY, LAPAROSCOPIC;  Surgeon: Montrell Gutierrez MD;  Location: [facility] OR;  Service: General;  Laterality: N/A;  RN PREOP 9/9----COVID Negative  9/9    OVARIAN CYST REMOVAL  3/13/2014    [place] REMOVAL OF OVARY/TUBE(S)      SPLENECTOMY, TOTAL  July 2003    TONSILLECTOMY      as a child    TYMPANOSTOMY TUBE PLACEMENT  1976    VEIN SURGERY  2003    Lt leg     Social History     Tobacco Use    Smoking status: Every Day     Packs/day: 0.50     Years: 37.00     Pack years: 18.50     Types: Cigarettes     Last attempt to quit: " "2020     Years since quittin.4    Smokeless tobacco: Current    Tobacco comments:     Enrolled in the [employer] on 5/3/14 ([facility] Member ID # [id number]). Ambulatory referral to Smoking Cessation Program   Substance Use Topics    Alcohol use: No     Alcohol/week: 0.0 standard drinks    Drug use: No        Review of Systems:  Review of Systems   Constitutional:  Negative for chills and fever.   HENT: Negative.     Respiratory: Negative.     Cardiovascular: Negative.    Gastrointestinal: Negative.    Genitourinary: Negative.    Musculoskeletal:  Positive for joint pain.     OBJECTIVE:     Vital Signs (Most Recent)  /64   Pulse 84   Temp 98.1 °F (36.7 °C)   Resp 16   Ht 5' 6" (1.676 m)   Wt 90.7 kg (200 lb)   LMP 2011 (LMP Unknown) Comment: stated when she was 39  SpO2 96%   Breastfeeding No   BMI 32.28 kg/m²     Physical Exam  Constitutional:       General: She is not in acute distress.     Appearance: Normal appearance.   HENT:      Head: Normocephalic and atraumatic.   Eyes:      Pupils: Pupils are equal, round, and reactive to light.   Cardiovascular:      Rate and Rhythm: Normal rate.   Pulmonary:      Effort: Pulmonary effort is normal. No respiratory distress.   Musculoskeletal:         General: Swelling and tenderness present.      Comments: Swelling and redness of LLE surgical site  Staples in place   Neurological:      General: No focal deficit present.      Mental Status: She is oriented to person, place, and time.   Psychiatric:         Mood and Affect: Mood normal.         Behavior: Behavior normal.       Significant Labs:  All pertinent labs from the last 24 hours have been reviewed.  CBC significant for elevated WBC and hyperglycemia       Imaging:   XR findings. Postsurgical changes of below-knee amputation. Soft tissues of the stump appear swollen.  No overt soft tissue gas.         ASSESSMENT/PLAN:     [unfilled]    ***      Case discussed with  ***.    "

## 2023-05-16 NOTE — ED TRIAGE NOTES
"Pt presents to the ED via EMS from home c/o left leg pain at amputation site that started today. Pt reports 10/10 pain that she describes as "shooting and like someone is shocking me" throughout her leg w/o any relief from Rx pain medication or muscle relaxer. Pt reports wound care nurse came to day to redress incision site, however there is some bloody drainage noted that the Pt states "this has been going on since my surgery." Upon removing dressing, warmth is noted to the site, incision appears to be healing well, no purulent drainage noted.   "

## 2023-05-16 NOTE — ED NOTES
Pt was notified by provider that she was not discharged until the surgeon consulted with her. Pt left in her wheelchair without discharge paperwork and IV was present.

## 2023-05-16 NOTE — PLAN OF CARE
[facility] - Emergency Dept  Discharge Final Note    Primary Care Provider: Donaldo Pena MD    Expected Discharge Date: 5/16/2023    Final Discharge Note (most recent)       Final Note - 05/16/23 1001          Final Note    Assessment Type Final Discharge Note     Anticipated Discharge Disposition Home-Health Care Svc   Resume care with [facility]    What phone number can be called within the next 1-3 days to see how you are doing after discharge? [phone]     Hospital Resources/Appts/Education Provided Appointments scheduled by Navigator/Coordinator        Post-Acute Status    Discharge Delays None known at this time                     Important Message from Medicare

## 2023-05-16 NOTE — CONSULTS
[facility] [place] [facility]  General Surgery  Consult Note    Inpatient consult to General Surgery  Consult performed by: Kristin Sutton MD  Consult ordered by: Tu Avendaño III, MD      Subjective:     Chief Complaint/Reason for Admission: cellulitis    History of Present Illness:   Audrey Natarajan is a 52 y.o. female with PMHx including COPD, DVT, DM, and L BKA 4/19/23 with Dr. Munoz, who is presenting for evaluation of pain at amputation site. Patient notes that the area at her staples has become more red and swollen.  She denies fever, chills, or other acute complaints. She says she has noted some clear SS drainage at the site. No pus. No wound breakdown. WBC elevated at 15.   Of note the patient recently f/u in clinc w/ Dr. Munoz and reported that she fell on her stump.     Current Facility-Administered Medications on File Prior to Encounter   Medication    [DISCONTINUED] GENERIC EXTERNAL MEDICATION    [DISCONTINUED] GENERIC EXTERNAL MEDICATION     Current Outpatient Medications on File Prior to Encounter   Medication Sig    albuterol (PROVENTIL/VENTOLIN HFA) 90 mcg/actuation inhaler INHALE 2 PUFFS INTO THE LUNGS EVERY 6 HOURS AS NEEDED FOR WHEEZING. RESCUE    albuterol-ipratropium (DUO-NEB) 2.5 mg-0.5 mg/3 mL nebulizer solution Take 3 mLs by nebulization every 6 (six) hours as needed for Wheezing or Shortness of Breath. Rescue    ammonium lactate (LAC-HYDRIN) 12 % lotion APPL Y ONCE TOPICALLY TWICE DAILY FOR 30 DAYS    apixaban (ELIQUIS) 5 mg Tab Take 1 tablet (5 mg total) by mouth in the morning and 1 tablet (5 mg total) in the evening. Indications: Thromboembolism secondary to Atrial Fibrillation.    aspirin 81 MG Chew Take 1 tablet (81 mg total) by mouth once daily.    bumetanide (BUMEX) 1 MG tablet Take 1 tablet (1 mg total) by mouth once daily.    cyanocobalamin (VITAMIN B-12) 1000 MCG tablet Take 100 mcg by mouth once daily.    divalproex (DEPAKOTE) 500 MG TbEC Take 1 tablet by mouth  every morning    DUPIXENT  mg/2 mL PnIj Inject into the skin every 14 (fourteen) days.    fluticasone propionate (FLONASE) 50 mcg/actuation nasal spray 2 sprays (100 mcg total) by Each Nostril route daily as needed (Nasal congestion).    fluticasone-salmeterol diskus inhaler 250-50 mcg Inhale 1 puff into the lungs 2 (two) times daily. Controller    gabapentin (NEURONTIN) 300 MG capsule Take 2 capsules (600 mg total) by mouth 3 (three) times a day.    hydrOXYzine (ATARAX) 50 MG tablet Take 0.5 tablets (25 mg total) by mouth 4 (four) times daily as needed for Itching or Anxiety.    LIDOcaine (LIDODERM) 5 % Place 1 patch onto the skin once daily. Remove & Discard patch within 12 hours or as directed by MD    lisinopriL 10 MG tablet TAKE 1 TABLET(10 MG) BY MOUTH EVERY DAY    metFORMIN (GLUCOPHAGE) 1000 MG tablet Take 1 tablet (1,000 mg total) by mouth 2 (two) times daily with meals.    methocarbamoL (ROBAXIN) 500 MG Tab Take 1 tablet (500 mg total) by mouth 3 (three) times daily. Frequency could not be confirmed. for 15 days    oxyCODONE (ROXICODONE) 5 MG immediate release tablet Take 1 tablet (5 mg total) by mouth every 8 (eight) hours as needed for Pain.    BIOFREEZE, MENTHOL, TOP Apply topically.    divalproex (DEPAKOTE) 500 MG TbEC Take 1 tablet (500 mg total) by mouth once daily. PO QAM    ferrous sulfate 325 (65 FE) MG EC tablet Take 1 tablet (325 mg total) by mouth once daily. (Patient not taking: Reported on 5/16/2023)    hydrOXYzine HCL (ATARAX) 25 MG tablet Take 1 tablet (25 mg total) by mouth every 6 (six) hours as needed for itching or anxiety.    insulin glargine 100 units/mL SubQ pen Inject 10 Units under the skin every morning Indications: Type 2 Diabetes.    insulin lispro 100 unit/mL pen Inject 0-16 Units under the skin 4 (four) times a day before meals and nightly Indications: High Blood Sugar. 151-200 4 units 201-250 8 units 251-300 10 units 301-350 12 units 351-400 16 units >400 16 units &  "Call Medical Provider.    loratadine (CLARITIN) 10 mg tablet Take 1 tablet (10 mg total) by mouth once daily.    metoprolol tartrate (LOPRESSOR) 25 MG tablet Take 1 tablet (25 mg total) by mouth 2 (two) times daily.    multivitamin Tab Take 1 tablet by mouth once daily.    nicotine (NICODERM CQ) 14 mg/24 hr Place 1 patch onto the skin once daily. (Patient not taking: Reported on 5/12/2023)    nystatin (NYSTOP) powder APPLY TO ABDOMINAL AND BREAST SKIN FOLD TWICE DAILY.    ondansetron (ZOFRAN) 8 MG tablet Take 1 tablet (8 mg total) by mouth every 8 (eight) hours as needed for Nausea.    pantoprazole (PROTONIX) 40 MG tablet Take 1 tablet (40 mg total) by mouth once daily.    pantoprazole (PROTONIX) 40 MG tablet Take 1 tablet (40 mg total) by mouth Daily before breakfast.    pen needle, diabetic (BD ERIC 2ND GEN PEN NEEDLE) 32 gauge x 5/32" Ndle Use with insulin 5 times daily    polyethylene glycol (GLYCOLAX) 17 gram/dose powder Mix 1 capful (17 g) with fluids and drink by mouth once daily.    pravastatin (PRAVACHOL) 40 MG tablet Take 1 tablet (40 mg total) by mouth every evening.    risperiDONE (RISPERDAL) 3 MG Tab Take 1 tablet (3 mg total) by mouth in the morning and 1 tablet (3 mg total) in the evening. Indications: Mood.    semaglutide (OZEMPIC SUBQ) Inject into the skin.    senna-docusate 8.6-50 mg (PERICOLACE) 8.6-50 mg per tablet Take 1 tablet by mouth once daily.    sulfamethoxazole-trimethoprim 800-160mg (BACTRIM DS) 800-160 mg Tab Take 1 tablet by mouth 2 (two) times daily. for 7 days    traMADoL (ULTRAM) 50 mg tablet Take 1 tablet (50 mg total) by mouth every 8 (eight) hours as needed for Pain (foot pain).    vitamin E 1000 UNIT capsule Take 1,000 Units by mouth once daily.    VYVANSE 40 mg Cap Take 40 mg by mouth once daily.    [DISCONTINUED] ASPERCREME, LIDOCAINE, 4 % PtMd Apply 1 patch topically.    [DISCONTINUED] bumetanide (BUMEX) 1 MG tablet Take 1 tablet (1 mg total) by mouth in the morning.    " [DISCONTINUED] diclofenac sodium (VOLTAREN) 1 % Gel Apply 2 g topically 4 (four) times daily as needed (Apply to painful area up to 4 times a day as needed for pain). Apply to painful area 4 times a day as needed for pain    [DISCONTINUED] furosemide (LASIX) 20 MG tablet TAKE 1 TABLET(20 MG) BY MOUTH EVERY DAY    [DISCONTINUED] QUEtiapine (SEROQUEL) 200 MG Tab Take 1 tablet (200 mg total) by mouth before breakfast.       Review of patient's allergies indicates:   Allergen Reactions    Morphine Other (See Comments)     Patient had a psychotic episode after taking Morphine  Agitation, hallucinations  Other Reaction(s): Other (See Comments), Other (See Comments)    Patient had a psychotic episode after taking Morphine    Patient had a psychotic episode after taking Morphine Agitation, hallucinations    Penicillins Anaphylaxis     Tolerated cephalosporins in the past    Januvia [sitagliptin] Hives    Carbamazepine Other (See Comments)     hyponatremia  Other Reaction(s): Other (See Comments)    hyponatremia       Past Medical History:   Diagnosis Date    ADHD (attention deficit hyperactivity disorder)     Arthritis     Asthma     Bipolar 1 disorder     Cataract     Cigarette smoker     COPD (chronic obstructive pulmonary disease)     Coronary artery disease     A fib    Depression     bipolar manic depresson    Diabetes mellitus     Diabetic foot ulcers     Diabetic neuropathy     DVT of lower extremity, bilateral 07/2013    bilateral LE DVT. [place] filter placed.     Encounter for blood transfusion     History of blood clots 1. Left Leg=2003; 2.Bilateral Groin=Blood Clots= 5 or 6/ 2013 & 7/2013; 3. LLL of Lung=7/2013;  4. Lt. Lower Leg=7/2013.     Pt. had 1st Blood Clot after Fvtivosrhbsi=4674, & Last=2013. [place] Filter= Rt.Lateral Neck.    HTN (hypertension) 06/06/2013    Pt states that she does not have hypertension    Hypercholesteremia     Irregular heartbeat     Neuromuscular disorder     neuropathy feet  "   Obese     PE (pulmonary embolism) 07/2013    bilat LE DVT.     Restless leg syndrome      Past Surgical History:   Procedure Laterality Date    ABDOMINAL SURGERY  2010    gastric sleeve    BELOW KNEE AMPUTATION OF LOWER EXTREMITY Left 4/19/2023    Procedure: AMPUTATION, BELOW KNEE;  Surgeon: Gabe Munoz MD;  Location: [facility] OR;  Service: General;  Laterality: Left;  RN PREOP 4/11/2023    BILATERAL OOPHORECTOMY Bilateral 1/12/2015    CHOLECYSTECTOMY      DEBRIDEMENT OF FOOT Bilateral 5/10/2022    Procedure: DEBRIDEMENT, FOOT;  Surgeon: Maira De Los Santos DPM;  Location: [facility] OR;  Service: Podiatry;  Laterality: Bilateral;    DEBRIDEMENT OF FOOT Left 2/28/2023    Procedure: DEBRIDEMENT, FOOT,biopsy;  Surgeon: Maira De Los Santos DPM;  Location: [facility] OR;  Service: Podiatry;  Laterality: Left;  request misonix, wound VAC, possible neoxx    Green' s filter Right 7/4/2012    Right Neck & Tunneled Down.    HERNIA REPAIR      "[place] of Hernias Repaires around th Belly Button.", pt. states    INCISION AND DRAINAGE FOOT Left 12/24/2022    Procedure: INCISION AND DRAINAGE, FOOT;  Surgeon: Fahad Razo DPM;  Location: [facility] OR;  Service: Podiatry;  Laterality: Left;    LAPAROSCOPIC CHOLECYSTECTOMY N/A 9/10/2020    Procedure: CHOLECYSTECTOMY, LAPAROSCOPIC;  Surgeon: Montrell Gutierrez MD;  Location: [facility] OR;  Service: General;  Laterality: N/A;  RN PREOP 9/9----COVID Negative  9/9    OVARIAN CYST REMOVAL  3/13/2014    [place] REMOVAL OF OVARY/TUBE(S)      SPLENECTOMY, TOTAL  July 2003    TONSILLECTOMY      as a child    TYMPANOSTOMY TUBE PLACEMENT  1976    VEIN SURGERY  2003    Lt leg     Family History       Problem Relation (Age of Onset)    Cataracts Father    Diabetes Father, Paternal Grandfather    Heart disease Father, Paternal Grandfather    Hypertension Father    No Known Problems Mother, Sister, Brother, Maternal Aunt, Maternal Uncle, Paternal Aunt, Paternal Uncle, Maternal Grandfather    Ovarian cancer Maternal Grandmother, " Paternal Grandmother          Tobacco Use    Smoking status: Every Day     Packs/day: 0.50     Years: 37.00     Pack years: 18.50     Types: Cigarettes     Last attempt to quit: 2020     Years since quittin.4    Smokeless tobacco: Current    Tobacco comments:     Enrolled in the [employer] on 5/3/14 ([facility] Member ID # [id number]). Ambulatory referral to Smoking Cessation Program   Substance and Sexual Activity    Alcohol use: No     Alcohol/week: 0.0 standard drinks    Drug use: No    Sexual activity: Yes     Partners: Male     Review of Systems   Constitutional:  Negative for fatigue and unexpected weight change.   HENT:  Negative for facial swelling.    Eyes:  Negative for redness.   Respiratory:  Negative for chest tightness and shortness of breath.    Cardiovascular:  Positive for leg swelling. Negative for chest pain.   Gastrointestinal:  Negative for abdominal pain, blood in stool, constipation, diarrhea and vomiting.   Neurological:  Negative for dizziness and headaches.   Objective:     Vital Signs (Most Recent):  Temp: 98.1 °F (36.7 °C) (23 0735)  Pulse: 84 (23 0735)  Resp: 16 (23 0825)  BP: 121/64 (23 0735)  SpO2: 96 % (23 0735) Vital Signs (24h Range):  Temp:  [97.9 °F (36.6 °C)-98.5 °F (36.9 °C)] 98.1 °F (36.7 °C)  Pulse:  [65-84] 84  Resp:  [14-18] 16  SpO2:  [93 %-98 %] 96 %  BP: ()/(51-80) 121/64     Weight: 90.7 kg (200 lb)  Body mass index is 32.28 kg/m².      Intake/Output Summary (Last 24 hours) at 2023 0925  Last data filed at 2023 0454  Gross per 24 hour   Intake 1533.05 ml   Output 800 ml   Net 733.05 ml       Physical Exam  Constitutional:       General: She is not in acute distress.     Appearance: Normal appearance.   HENT:      Head: Normocephalic and atraumatic.      Mouth/Throat:      Mouth: Mucous membranes are moist.   Eyes:      Pupils: Pupils are equal, round, and reactive to light.   Cardiovascular:      Rate and Rhythm: Normal  rate.   Pulmonary:      Effort: Pulmonary effort is normal. No respiratory distress.   Abdominal:      General: Abdomen is flat. There is no distension.      Palpations: Abdomen is soft.   Musculoskeletal:         General: Normal range of motion.      Cervical back: Normal range of motion.      Comments: L BKA w/ incision CDI. Some erythema at the staple line, otherwise appears to be healing well. No fluctuance or drainage.    Skin:     General: Skin is warm and dry.   Neurological:      General: No focal deficit present.      Mental Status: She is alert and oriented to person, place, and time.   Psychiatric:         Mood and Affect: Mood normal.         Behavior: Behavior normal.             Significant Labs:  All pertinent labs from the last 24 hours have been reviewed.    Significant Diagnostics:  I have reviewed all pertinent imaging results/findings within the past 24 hours.    Assessment/Plan:     There are no hospital problems to display for this patient.  Patient is a 52y F w/ DMII s/p L KERRI in April who presents with erythema and tenderness at her incision site. No evidence of purulence or abscess. Possibly due to recent trauma.     OK to f/u with us in clinic PRN  No surgical intervention at this time      Thank you for your consult. I will follow-up with patient. Please contact us if you have any additional questions.    Kristin Sutton MD  General Surgery  [facility] - Emergency Dept

## 2023-05-16 NOTE — PLAN OF CARE
Call received from Rosemary with [facility] requesting wound care orders.  MELVI advised Rosemary that a secure chat message would be sent to the resident requesting orders.  Rosemary will f/u with  later today.

## 2023-05-16 NOTE — ED PROVIDER NOTES
Encounter Date: 5/15/2023       History     Chief Complaint   Patient presents with    Leg Pain     Patient arrives via EMS with left leg pain at amputation site. Amputation last month at [facility]. Site began hurting tonight after taking meds. Some bloody drainage noted to bandage, patient reports that this has been ongoing since the surgery. Denied purulent drainage.     51 yo female w/ Hx of COPD, CAD, DM, presenting with left stump pain at amputation site.  Patient notes pain is become increasingly more painful and associated with spasming as well as drainage.  Patient reports her wound care nurse change her dressing today and has already started soaking through.  Denies fevers or chills.  Reports attempting pain medication at home including gabapentin, opiates, muscle relaxants without improvement.  Denies shortness of breath.  Previously in usual state of health.    Review of patient's allergies indicates:   Allergen Reactions    Morphine Other (See Comments)     Patient had a psychotic episode after taking Morphine  Agitation, hallucinations  Other Reaction(s): Other (See Comments), Other (See Comments)    Patient had a psychotic episode after taking Morphine    Patient had a psychotic episode after taking Morphine Agitation, hallucinations    Penicillins Anaphylaxis     Tolerated cephalosporins in the past    Januvia [sitagliptin] Hives    Carbamazepine Other (See Comments)     hyponatremia  Other Reaction(s): Other (See Comments)    hyponatremia     Past Medical History:   Diagnosis Date    ADHD (attention deficit hyperactivity disorder)     Arthritis     Asthma     Bipolar 1 disorder     Cataract     Cigarette smoker     COPD (chronic obstructive pulmonary disease)     Coronary artery disease     A fib    Depression     bipolar manic depresson    Diabetes mellitus     Diabetic foot ulcers     Diabetic neuropathy     DVT of lower extremity, bilateral 07/2013    bilateral LE DVT. Estelita filter placed.      "Encounter for blood transfusion     History of blood clots 1. Left Leg=2003; 2.Bilateral Groin=Blood Clots= 5 or 6/ 2013 & 7/2013; 3. LLL of Lung=7/2013;  4. Lt. Lower Leg=7/2013.     Pt. had 1st Blood Clot after Xrdcuzvoapdx=[id number], & Last=2013. [place] Filter= Rt.Lateral Neck.    HTN (hypertension) 06/06/2013    Pt states that she does not have hypertension    Hypercholesteremia     Irregular heartbeat     Neuromuscular disorder     neuropathy feet    Obese     PE (pulmonary embolism) 07/2013    bilat LE DVT.     Restless leg syndrome      Past Surgical History:   Procedure Laterality Date    ABDOMINAL SURGERY  2010    gastric sleeve    BELOW KNEE AMPUTATION OF LOWER EXTREMITY Left 4/19/2023    Procedure: AMPUTATION, BELOW KNEE;  Surgeon: Gabe Munoz MD;  Location: [facility] OR;  Service: General;  Laterality: Left;  RN PREOP 4/11/2023    BILATERAL OOPHORECTOMY Bilateral 1/12/2015    CHOLECYSTECTOMY      DEBRIDEMENT OF FOOT Bilateral 5/10/2022    Procedure: DEBRIDEMENT, FOOT;  Surgeon: Maira De Los Santos DPM;  Location: [facility] OR;  Service: Podiatry;  Laterality: Bilateral;    DEBRIDEMENT OF FOOT Left 2/28/2023    Procedure: DEBRIDEMENT, FOOT,biopsy;  Surgeon: Maira De Los Santos DPM;  Location: [facility] OR;  Service: Podiatry;  Laterality: Left;  request misonix, wound VAC, possible neoxx    Green' s filter Right 7/4/2012    Right Neck & Tunneled Down.    HERNIA REPAIR      "[place] of Hernias Repaires around th Belly Button.", pt. states    INCISION AND DRAINAGE FOOT Left 12/24/2022    Procedure: INCISION AND DRAINAGE, FOOT;  Surgeon: Fahad Razo DPM;  Location: [facility] OR;  Service: Podiatry;  Laterality: Left;    LAPAROSCOPIC CHOLECYSTECTOMY N/A 9/10/2020    Procedure: CHOLECYSTECTOMY, LAPAROSCOPIC;  Surgeon: Montrell Gutierrez MD;  Location: [facility] OR;  Service: General;  Laterality: N/A;  RN PREOP 9/9----COVID Negative  9/9    OVARIAN CYST REMOVAL  3/13/2014    DE REMOVAL OF OVARY/TUBE(S)      SPLENECTOMY, TOTAL  July 2003    " TONSILLECTOMY      as a child    TYMPANOSTOMY TUBE PLACEMENT      VEIN SURGERY      Lt leg     Family History   Problem Relation Age of Onset    Hypertension Father     Diabetes Father     Heart disease Father     Cataracts Father     Diabetes Paternal Grandfather     Heart disease Paternal Grandfather     No Known Problems Mother     Ovarian cancer Maternal Grandmother          from this. ? age     No Known Problems Sister     No Known Problems Brother     No Known Problems Maternal Aunt     No Known Problems Maternal Uncle     No Known Problems Paternal Aunt     No Known Problems Paternal Uncle     No Known Problems Maternal Grandfather     Ovarian cancer Paternal Grandmother     Uterine cancer Neg Hx     Breast cancer Neg Hx     Colon cancer Neg Hx     Amblyopia Neg Hx     Blindness Neg Hx     Cancer Neg Hx     Glaucoma Neg Hx     Macular degeneration Neg Hx     Retinal detachment Neg Hx     Strabismus Neg Hx     Stroke Neg Hx     Thyroid disease Neg Hx      Social History     Tobacco Use    Smoking status: Every Day     Packs/day: 0.50     Years: 37.00     Pack years: 18.50     Types: Cigarettes     Last attempt to quit: 2020     Years since quittin.4    Smokeless tobacco: Current    Tobacco comments:     Enrolled in the [employer] on 5/3/14 ([facility] Member ID # [id number]). Ambulatory referral to Smoking Cessation Program   Substance Use Topics    Alcohol use: No     Alcohol/week: 0.0 standard drinks    Drug use: No     Review of Systems   Constitutional:  Negative for chills and fever.   Respiratory:  Negative for shortness of breath.    Cardiovascular:  Negative for chest pain.   Gastrointestinal:  Negative for nausea and vomiting.   Genitourinary:  Negative for dysuria.   Musculoskeletal:  Positive for arthralgias and joint swelling. Negative for back pain.   Skin:  Positive for wound. Negative for rash.   Neurological:  Negative for weakness.     Physical Exam     Initial Vitals  [05/15/23 2206]   BP Pulse Resp Temp SpO2   (!) 170/80 74 18 98.4 °F (36.9 °C) 98 %      MAP       --         Physical Exam    Nursing note and vitals reviewed.  Constitutional: She appears well-developed and well-nourished. She is not diaphoretic. No distress.   HENT:   Head: Normocephalic and atraumatic.   Nose: Nose normal.   Eyes: EOM are normal. Pupils are equal, round, and reactive to light. No scleral icterus.   Neck: Neck supple.   Normal range of motion.  Cardiovascular:  Normal rate, regular rhythm, normal heart sounds and intact distal pulses.     Exam reveals no gallop and no friction rub.       No murmur heard.  Pulmonary/Chest: Breath sounds normal. No stridor. No respiratory distress. She has no wheezes. She has no rhonchi. She has no rales.   Abdominal: Abdomen is soft. Bowel sounds are normal. She exhibits no distension. There is no abdominal tenderness. There is no rebound and no guarding.   Musculoskeletal:         General: Tenderness and edema present. Normal range of motion.      Cervical back: Normal range of motion and neck supple.      Comments: Left BKA stump with redness, warmth, drainage to middle lateral section. Moderately hot to touch. No definite fluctuance but drainage from suture line.      Neurological: She is alert and oriented to person, place, and time. No cranial nerve deficit. GCS score is 15. GCS eye subscore is 4. GCS verbal subscore is 5. GCS motor subscore is 6.   Skin: Skin is warm and dry. No rash noted.   Psychiatric: She has a normal mood and affect. Her behavior is normal.       ED Course   Procedures  Labs Reviewed   CULTURE, BLOOD   CULTURE, BLOOD   CBC W/ AUTO DIFFERENTIAL   COMPREHENSIVE METABOLIC PANEL   URINALYSIS, REFLEX TO URINE CULTURE   MAGNESIUM   PHOSPHORUS          Imaging Results    None          Medications   fentaNYL 50 mcg/mL injection 50 mcg (50 mcg Intravenous Given 5/15/23 2322)     Medical Decision Making:   Initial Assessment:   52-year-old  presenting with left stump pain swelling and tenderness.  On exam the patient is mildly uncomfortable appearing.  Noted drainage and swelling.  Warm to hot to touch.  Redness noted.  Concern for developing cellulitis.  Patient given pain medication.  Will get labs, x-ray.  Patient being signed out to Dr. Padgett pending xrays and labs.            ED Course as of 05/18/23 2136   Tue May 16, 2023   0107 Exam and workup most consistent with cellulitis left lower extremity.  Patient given vancomycin and Rocephin for antibiotic coverage.  Will admit to hospital medicine [BS]      ED Course User Index  [BS] Bucky Padgett MD                 Clinical Impression:   Final diagnoses:  [M25.562] Left knee pain               Ricky Cisse MD  05/15/23 2332       Ricky Cisse MD  05/18/23 2136

## 2023-05-16 NOTE — ASSESSMENT & PLAN NOTE
Pt sxs appear most likely related to neuropathic pain at recent BKA site.  Pt is on gabapentin, robaxin, and oxycodone at home for sxs    -continue these on disposition. Pt advised she can increase oxycodone from 5 to 10 if pain is worse  -f/u with pcp and surgeon

## 2023-05-16 NOTE — PLAN OF CARE
[facility] Emergency Dept  Discharge Assessment    Primary Care Provider: Donaldo Pena MD     Case Management Assessment     Pharmacy: [facility] [address]    Patient Arrived From: Home  Existing Help at Home: grandmother, sister, and God Mother    Barriers to Discharge: none    Discharge Plan:    A. Home with Home Health ([facility])   B. [place]      Discharge planning assessment completed with patient's assistance.  Patient from home alone but has help from grandmother, sister, and god mother.  Patient uses a wheelchair to assist with ambulation  due to AKA.  Patient will discharge to home and followup with surgery only at discharge.  Patient's grandmother will transport her home.         Discharge Assessment (most recent)       BRIEF DISCHARGE ASSESSMENT - 05/16/23 0957          Discharge Planning    Assessment Type Discharge Planning Brief Assessment     Resource/Environmental Concerns none     Support Systems Parent;Family members     Assistance Needed Needs assistance with ambulation;  BKA;  Wheelchair to asssist with mobility     Equipment Currently Used at Home wheelchair;bedside commode     Current Living Arrangements home     Care Facility Name n/a     Patient/Family Anticipates Transition to home     Patient/Family Anticipated Services at Transition home health care   [facility] with [facility] as previously scheduled.    DME Needed Upon Discharge  none     Discharge Plan A Home     Discharge Plan B Home Health

## 2023-05-17 NOTE — HOSPITAL COURSE
52F with pmh of COPD, CAD, DM, presenting with left stump pain at amputation site.  Patient notes pain is become increasingly more painful and associated with spasming. Pt states the pain started in her knee area and radiated down like it was tingling in her foot which is not present. Pt states the pain would come intermittently and treated with her gabapentin, but this night the pain remained constant so she came for evaluation. Pt denies fever, chills, n/v, cp, sob, vision changes, warmth to the leg, worsening redness of the area or increased drainage. Pt has an appointment to see her surgeon on 5/18. Pt started on abx in the ed for possible cellulitis. Pt initially evaluated in ed and surgery consulted who saw pt and did not note any signs of infection or need for surgical intervention. On attempts to re-evaluate the pt and discuss disposition pt had eloped from the ED. Nursing had already removed pt IV, but she was told that she needed to be seen by her physician, but left prior. Pt is on bactrim at home and should continue to take this as prescribed by her surgeon

## 2023-05-17 NOTE — DISCHARGE SUMMARY
[facility] Emergency Dept  Hospital Medicine  Discharge Summary      Patient Name: Audrey Natarajan  MRN: [id number]  [facility]: [id number]  Patient Class: OP- Observation  Admission Date: 5/15/2023  Hospital Length of Stay: 0 days  Discharge Date and Time: 5/16/2023 11:50 AM  Attending Physician: No att. providers found   Discharging Provider: Tu Avendaño III, MD  Primary Care Provider: Donaldo Pena MD    Primary Care Team: Networked reference to record PCT     HPI:   52F with pmh of COPD, CAD, DM, presenting with left stump pain at amputation site.  Patient notes pain is become increasingly more painful and associated with spasming. Pt states the pain started in her knee area and radiated down like it was tingling in her foot which is not present. Pt states the pain would come intermittently and treated with her gabapentin, but this night the pain remained constant so she came for evaluation. Pt denies fever, chills, n/v, cp, sob, vision changes, warmth to the leg, worsening redness of the area or increased drainage. Pt has an appointment to see her surgeon on 5/18. Pt started on abx in the ed for possible cellulitis. Pt initially evaluated in ed and surgery consulted who saw pt and did not note any signs of infection or need for surgical intervention. On attempts to re-evaluate the pt and discuss disposition pt had eloped from the ED. Nursing had already removed pt IV, but she was told that she needed to be seen by her physician, but left prior.       * No surgery found *      Hospital Course:   52F with pmh of COPD, CAD, DM, presenting with left stump pain at amputation site.  Patient notes pain is become increasingly more painful and associated with spasming. Pt states the pain started in her knee area and radiated down like it was tingling in her foot which is not present. Pt states the pain would come intermittently and treated with her gabapentin, but this night the pain remained constant so she came  for evaluation. Pt denies fever, chills, n/v, cp, sob, vision changes, warmth to the leg, worsening redness of the area or increased drainage. Pt has an appointment to see her surgeon on 5/18. Pt started on abx in the ed for possible cellulitis. Pt initially evaluated in ed and surgery consulted who saw pt and did not note any signs of infection or need for surgical intervention. On attempts to re-evaluate the pt and discuss disposition pt had eloped from the ED. Nursing had already removed pt IV, but she was told that she needed to be seen by her physician, but left prior. Pt is on bactrim at home and should continue to take this as prescribed by her surgeon       Goals of Care Treatment Preferences:  Code Status: Full Code    Living Will: Yes              Consults:   Consults (From admission, onward)        Status Ordering Provider     Inpatient consult to General Surgery  Once        Provider:  (Not yet assigned)    Completed RITA MELCHOR III          No new Assessment & Plan notes have been filed under this hospital service since the last note was generated.  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] 05/16/2023 Yes    Chronic deep vein thrombosis (DVT) of both lower extremities [I82.503] 04/13/2022 Yes    Long term (current) use of anticoagulants [Z79.01] 09/03/2019 Not Applicable    Cellulitis of left lower extremity [L03.116] 07/16/2016 Yes    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 [E11.40] 01/13/2015 Yes    Hyperlipidemia [E78.5] 02/13/2014 Yes     Chronic      Problems Resolved During this Admission:       Discharged Condition: against medical advice    Disposition: Home or Self Care    Follow Up:    Patient Instructions:      Ambulatory referral/consult to [facility]   Standing Status: Future   Referral Priority: Urgent Referral Type: Consultation   Referral Reason:  Specialty Services Required   Number of Visits Requested: 1     Diet diabetic     Notify your health care provider if you experience any of the following:  increased confusion or weakness     Notify your health care provider if you experience any of the following:  persistent dizziness, light-headedness, or visual disturbances     Notify your health care provider if you experience any of the following:  worsening rash     Notify your health care provider if you experience any of the following:  severe persistent headache     Notify your health care provider if you experience any of the following:  difficulty breathing or increased cough     Notify your health care provider if you experience any of the following:  redness, tenderness, or signs of infection (pain, swelling, redness, odor or green/yellow discharge around incision site)     Notify your health care provider if you experience any of the following:  severe uncontrolled pain     Notify your health care provider if you experience any of the following:  persistent nausea and vomiting or diarrhea     Notify your health care provider if you experience any of the following:  temperature >100.4     Activity as tolerated       Significant Diagnostic Studies: N/A    Pending Diagnostic Studies:     None         Medications:  Reconciled Home Medications:      Medication List      CONTINUE taking these medications    albuterol 90 mcg/actuation inhaler  Commonly known as: PROVENTIL/VENTOLIN HFA  INHALE 2 PUFFS INTO THE LUNGS EVERY 6 HOURS AS NEEDED FOR WHEEZING. RESCUE     albuterol-ipratropium 2.5 mg-0.5 mg/3 mL nebulizer solution  Commonly known as: DUO-NEB  Take 3 mLs by nebulization every 6 (six) hours as needed for Wheezing or Shortness of Breath. Rescue     ammonium lactate 12 % lotion  Commonly known as: LAC-HYDRIN  APPL Y ONCE TOPICALLY TWICE DAILY FOR 30 DAYS     aspirin 81 MG Chew  Take 1 tablet (81 mg total) by mouth once daily.     BD ERIC 2ND GEN PEN  "NEEDLE 32 gauge x 5/32" Ndle  Generic drug: pen needle, diabetic  Use with insulin 5 times daily     BIOFREEZE (MENTHOL) TOP  Apply topically.     bumetanide 1 MG tablet  Commonly known as: BUMEX  Take 1 tablet (1 mg total) by mouth once daily.     cyanocobalamin 1000 MCG tablet  Commonly known as: VITAMIN B-12  Take 100 mcg by mouth once daily.     * divalproex 500 MG Tbec  Commonly known as: DEPAKOTE  Take 1 tablet (500 mg total) by mouth once daily. PO QAM     * divalproex 500 MG Tbec  Commonly known as: DEPAKOTE  Take 1 tablet by mouth every morning     DUPIXENT  mg/2 mL Pnij  Generic drug: dupilumab  Inject into the skin every 14 (fourteen) days.     ELIQUIS 5 mg Tab  Generic drug: apixaban  Take 1 tablet (5 mg total) by mouth in the morning and 1 tablet (5 mg total) in the evening. Indications: Thromboembolism secondary to Atrial Fibrillation.     fluticasone propionate 50 mcg/actuation nasal spray  Commonly known as: FLONASE  2 sprays (100 mcg total) by Each Nostril route daily as needed (Nasal congestion).     fluticasone-salmeterol 250-50 mcg/dose 250-50 mcg/dose diskus inhaler  Commonly known as: ADVAIR  Inhale 1 puff into the lungs 2 (two) times daily. Controller     gabapentin 300 MG capsule  Commonly known as: NEURONTIN  Take 2 capsules (600 mg total) by mouth 3 (three) times a day.     GAVILAX 17 gram/dose powder  Generic drug: polyethylene glycol  Mix 1 capful (17 g) with fluids and drink by mouth once daily.     * hydrOXYzine 50 MG tablet  Commonly known as: ATARAX  Take 0.5 tablets (25 mg total) by mouth 4 (four) times daily as needed for Itching or Anxiety.     * hydrOXYzine HCL 25 MG tablet  Commonly known as: ATARAX  Take 1 tablet (25 mg total) by mouth every 6 (six) hours as needed for itching or anxiety.     insulin lispro 100 unit/mL pen  Inject 0-16 Units under the skin 4 (four) times a day before meals and nightly Indications: High Blood Sugar. 151-200 4 units 201-250 8 units 251-300 " 10 units 301-350 12 units 351-400 16 units >400 16 units & Call Medical Provider.     LANTUS SOLOSTAR U-100 INSULIN glargine 100 units/mL SubQ pen  Generic drug: insulin  Inject 10 Units under the skin every morning Indications: Type 2 Diabetes.     LIDOcaine 5 %  Commonly known as: LIDODERM  Place 1 patch onto the skin once daily. Remove & Discard patch within 12 hours or as directed by MD     lisinopriL 10 MG tablet  TAKE 1 TABLET(10 MG) BY MOUTH EVERY DAY     loratadine 10 mg tablet  Commonly known as: CLARITIN  Take 1 tablet (10 mg total) by mouth once daily.     metFORMIN 1000 MG tablet  Commonly known as: GLUCOPHAGE  Take 1 tablet (1,000 mg total) by mouth 2 (two) times daily with meals.     methocarbamoL 500 MG Tab  Commonly known as: ROBAXIN  Take 1 tablet (500 mg total) by mouth 3 (three) times daily. Frequency could not be confirmed. for 15 days     metoprolol tartrate 25 MG tablet  Commonly known as: LOPRESSOR  Take 1 tablet (25 mg total) by mouth 2 (two) times daily.     multivitamin Tab  Take 1 tablet by mouth once daily.     nystatin powder  Commonly known as: NYSTOP  APPLY TO ABDOMINAL AND BREAST SKIN FOLD TWICE DAILY.     ondansetron 8 MG tablet  Commonly known as: ZOFRAN  Take 1 tablet (8 mg total) by mouth every 8 (eight) hours as needed for Nausea.     oxyCODONE 5 MG immediate release tablet  Commonly known as: ROXICODONE  Take 1 tablet (5 mg total) by mouth every 8 (eight) hours as needed for Pain.     OZEMPIC SUBQ  Inject into the skin.     * pantoprazole 40 MG tablet  Commonly known as: PROTONIX  Take 1 tablet (40 mg total) by mouth once daily.     * pantoprazole 40 MG tablet  Commonly known as: PROTONIX  Take 1 tablet (40 mg total) by mouth Daily before breakfast.     pravastatin 40 MG tablet  Commonly known as: PRAVACHOL  Take 1 tablet (40 mg total) by mouth every evening.     risperiDONE 3 MG Tab  Commonly known as: RISPERDAL  Take 1 tablet (3 mg total) by mouth in the morning and 1 tablet  (3 mg total) in the evening. Indications: Mood.     SENEXON-S 8.6-50 mg per tablet  Generic drug: senna-docusate 8.6-50 mg  Take 1 tablet by mouth once daily.     sulfamethoxazole-trimethoprim 800-160mg 800-160 mg Tab  Commonly known as: BACTRIM DS  Take 1 tablet by mouth 2 (two) times daily. for 7 days     traMADoL 50 mg tablet  Commonly known as: ULTRAM  Take 1 tablet (50 mg total) by mouth every 8 (eight) hours as needed for Pain (foot pain).     vitamin E 1000 UNIT capsule  Take 1,000 Units by mouth once daily.     VYVANSE 40 MG Cap  Generic drug: lisdexamfetamine  Take 40 mg by mouth once daily.         * This list has 6 medication(s) that are the same as other medications prescribed for you. Read the directions carefully, and ask your doctor or other care provider to review them with you.            ASK your doctor about these medications    ferrous sulfate 325 (65 FE) MG EC tablet  Take 1 tablet (325 mg total) by mouth once daily.     nicotine 14 mg/24 hr  Commonly known as: NICODERM CQ  Place 1 patch onto the skin once daily.            Indwelling Lines/Drains at time of discharge:   Lines/Drains/Airways     None                 Time spent on the discharge of patient: 15 minutes         Tu Avendaño III, MD  Department of Hospital Medicine  [facility] - Emergency Dept

## 2023-05-19 ENCOUNTER — OFFICE VISIT (OUTPATIENT)
Dept: SURGERY | Facility: CLINIC | Age: 53
End: 2023-05-19
Payer: MEDICAID

## 2023-05-19 VITALS
SYSTOLIC BLOOD PRESSURE: 144 MMHG | BODY MASS INDEX: 32.28 KG/M2 | DIASTOLIC BLOOD PRESSURE: 66 MMHG | HEIGHT: 66 IN | OXYGEN SATURATION: 98 % | HEART RATE: 95 BPM

## 2023-05-19 DIAGNOSIS — L97.423 LEFT MIDFOOT ULCER, WITH NECROSIS OF MUSCLE: Primary | ICD-10-CM

## 2023-05-19 PROCEDURE — 99024 POSTOP FOLLOW-UP VISIT: CPT | Mod: ,,, | Performed by: SURGERY

## 2023-05-19 PROCEDURE — 99999 PR PBB SHADOW E&M-EST. PATIENT-LVL IV: CPT | Mod: PBBFAC,,, | Performed by: SURGERY

## 2023-05-19 PROCEDURE — 3077F PR MOST RECENT SYSTOLIC BLOOD PRESSURE >= 140 MM HG: ICD-10-PCS | Mod: CPTII,,, | Performed by: SURGERY

## 2023-05-19 PROCEDURE — 3008F PR BODY MASS INDEX (BMI) DOCUMENTED: ICD-10-PCS | Mod: CPTII,,, | Performed by: SURGERY

## 2023-05-19 PROCEDURE — 3077F SYST BP >= 140 MM HG: CPT | Mod: CPTII,,, | Performed by: SURGERY

## 2023-05-19 PROCEDURE — 3078F DIAST BP <80 MM HG: CPT | Mod: CPTII,,, | Performed by: SURGERY

## 2023-05-19 PROCEDURE — 3051F PR MOST RECENT HEMOGLOBIN A1C LEVEL 7.0 - < 8.0%: ICD-10-PCS | Mod: CPTII,,, | Performed by: SURGERY

## 2023-05-19 PROCEDURE — 1159F PR MEDICATION LIST DOCUMENTED IN MEDICAL RECORD: ICD-10-PCS | Mod: CPTII,,, | Performed by: SURGERY

## 2023-05-19 PROCEDURE — 3051F HG A1C>EQUAL 7.0%<8.0%: CPT | Mod: CPTII,,, | Performed by: SURGERY

## 2023-05-19 PROCEDURE — 99214 OFFICE O/P EST MOD 30 MIN: CPT | Mod: PBBFAC | Performed by: SURGERY

## 2023-05-19 PROCEDURE — 4010F ACE/ARB THERAPY RXD/TAKEN: CPT | Mod: CPTII,,, | Performed by: SURGERY

## 2023-05-19 PROCEDURE — 1159F MED LIST DOCD IN RCRD: CPT | Mod: CPTII,,, | Performed by: SURGERY

## 2023-05-19 PROCEDURE — 99999 PR PBB SHADOW E&M-EST. PATIENT-LVL IV: ICD-10-PCS | Mod: PBBFAC,,, | Performed by: SURGERY

## 2023-05-19 PROCEDURE — 4010F PR ACE/ARB THEARPY RXD/TAKEN: ICD-10-PCS | Mod: CPTII,,, | Performed by: SURGERY

## 2023-05-19 PROCEDURE — 3078F PR MOST RECENT DIASTOLIC BLOOD PRESSURE < 80 MM HG: ICD-10-PCS | Mod: CPTII,,, | Performed by: SURGERY

## 2023-05-19 PROCEDURE — 99024 PR POST-OP FOLLOW-UP VISIT: ICD-10-PCS | Mod: ,,, | Performed by: SURGERY

## 2023-05-19 PROCEDURE — 3008F BODY MASS INDEX DOCD: CPT | Mod: CPTII,,, | Performed by: SURGERY

## 2023-05-20 LAB
BACTERIA BLD CULT: NORMAL
BACTERIA BLD CULT: NORMAL

## 2023-05-25 ENCOUNTER — TELEPHONE (OUTPATIENT)
Dept: SURGERY | Facility: CLINIC | Age: 53
End: 2023-05-25
Payer: MEDICAID

## 2023-05-25 NOTE — TELEPHONE ENCOUNTER
Spoke with patient, informed her that Dr. Munoz is out of clinic today and her message will be forwarded to him.

## 2023-05-25 NOTE — TELEPHONE ENCOUNTER
----- Message from [place] MA sent at 5/25/2023 12:51 PM CDT -----  Type: Patient Call Back    Who called: Self    What is the request in detail: she is asking for a order for a lift chair to be placed to your insurance or to [employer].. she was told they would pay 100%..    Can the clinic reply by [facility]?No    Would the patient rather a call back or a response via [facility]? yes    Best call back number: [phone] (home)

## 2023-05-26 DIAGNOSIS — L97.423 LEFT MIDFOOT ULCER, WITH NECROSIS OF MUSCLE: Primary | ICD-10-CM

## 2023-05-26 NOTE — TELEPHONE ENCOUNTER
Order, demographics and insurance faxed to [facility] for (hover) lift chair.    Patient informed.

## 2023-06-01 ENCOUNTER — PATIENT MESSAGE (OUTPATIENT)
Dept: ADMINISTRATIVE | Facility: HOSPITAL | Age: 53
End: 2023-06-01
Payer: MEDICAID

## 2023-06-09 ENCOUNTER — OFFICE VISIT (OUTPATIENT)
Dept: SURGERY | Facility: CLINIC | Age: 53
End: 2023-06-09
Payer: MEDICAID

## 2023-06-09 VITALS
SYSTOLIC BLOOD PRESSURE: 143 MMHG | BODY MASS INDEX: 32.28 KG/M2 | HEART RATE: 86 BPM | OXYGEN SATURATION: 99 % | DIASTOLIC BLOOD PRESSURE: 78 MMHG | HEIGHT: 66 IN

## 2023-06-09 DIAGNOSIS — L97.423 LEFT MIDFOOT ULCER, WITH NECROSIS OF MUSCLE: Primary | ICD-10-CM

## 2023-06-09 PROCEDURE — G0179 MD RECERTIFICATION HHA PT: HCPCS | Mod: ,,, | Performed by: SURGERY

## 2023-06-09 PROCEDURE — 1159F PR MEDICATION LIST DOCUMENTED IN MEDICAL RECORD: ICD-10-PCS | Mod: CPTII,,, | Performed by: SURGERY

## 2023-06-09 PROCEDURE — 99024 PR POST-OP FOLLOW-UP VISIT: ICD-10-PCS | Mod: ,,, | Performed by: SURGERY

## 2023-06-09 PROCEDURE — 3008F BODY MASS INDEX DOCD: CPT | Mod: CPTII,,, | Performed by: SURGERY

## 2023-06-09 PROCEDURE — 3077F PR MOST RECENT SYSTOLIC BLOOD PRESSURE >= 140 MM HG: ICD-10-PCS | Mod: CPTII,,, | Performed by: SURGERY

## 2023-06-09 PROCEDURE — 3051F PR MOST RECENT HEMOGLOBIN A1C LEVEL 7.0 - < 8.0%: ICD-10-PCS | Mod: CPTII,,, | Performed by: SURGERY

## 2023-06-09 PROCEDURE — 1159F MED LIST DOCD IN RCRD: CPT | Mod: CPTII,,, | Performed by: SURGERY

## 2023-06-09 PROCEDURE — 3051F HG A1C>EQUAL 7.0%<8.0%: CPT | Mod: CPTII,,, | Performed by: SURGERY

## 2023-06-09 PROCEDURE — 99024 POSTOP FOLLOW-UP VISIT: CPT | Mod: ,,, | Performed by: SURGERY

## 2023-06-09 PROCEDURE — 99215 OFFICE O/P EST HI 40 MIN: CPT | Mod: PBBFAC | Performed by: SURGERY

## 2023-06-09 PROCEDURE — 3078F DIAST BP <80 MM HG: CPT | Mod: CPTII,,, | Performed by: SURGERY

## 2023-06-09 PROCEDURE — 4010F ACE/ARB THERAPY RXD/TAKEN: CPT | Mod: CPTII,,, | Performed by: SURGERY

## 2023-06-09 PROCEDURE — 3008F PR BODY MASS INDEX (BMI) DOCUMENTED: ICD-10-PCS | Mod: CPTII,,, | Performed by: SURGERY

## 2023-06-09 PROCEDURE — 99999 PR PBB SHADOW E&M-EST. PATIENT-LVL V: ICD-10-PCS | Mod: PBBFAC,,, | Performed by: SURGERY

## 2023-06-09 PROCEDURE — 4010F PR ACE/ARB THEARPY RXD/TAKEN: ICD-10-PCS | Mod: CPTII,,, | Performed by: SURGERY

## 2023-06-09 PROCEDURE — 3077F SYST BP >= 140 MM HG: CPT | Mod: CPTII,,, | Performed by: SURGERY

## 2023-06-09 PROCEDURE — 3078F PR MOST RECENT DIASTOLIC BLOOD PRESSURE < 80 MM HG: ICD-10-PCS | Mod: CPTII,,, | Performed by: SURGERY

## 2023-06-09 PROCEDURE — 99999 PR PBB SHADOW E&M-EST. PATIENT-LVL V: CPT | Mod: PBBFAC,,, | Performed by: SURGERY

## 2023-06-09 PROCEDURE — G0179 PR HOME HEALTH MD RECERTIFICATION: ICD-10-PCS | Mod: ,,, | Performed by: SURGERY

## 2023-06-09 RX ORDER — DOXYCYCLINE 100 MG/1
100 CAPSULE ORAL 2 TIMES DAILY
Qty: 20 CAPSULE | Refills: 3 | Status: ON HOLD | OUTPATIENT
Start: 2023-06-09 | End: 2023-10-11 | Stop reason: SDUPTHER

## 2023-06-13 ENCOUNTER — TELEPHONE (OUTPATIENT)
Dept: SURGERY | Facility: CLINIC | Age: 53
End: 2023-06-13
Payer: MEDICAID

## 2023-06-13 NOTE — TELEPHONE ENCOUNTER
Received message via secure chat from physical therapist stating the appoint was scheduled incorrectly and she will cancel patients appointment.     Reached out to patient. No answer. No voice mail.

## 2023-06-13 NOTE — TELEPHONE ENCOUNTER
Spoke with patient she states she was told her appointment would be canceled. And that she would need physical therapy referral.

## 2023-06-13 NOTE — TELEPHONE ENCOUNTER
----- Message from Dameon Reynolds sent at 6/13/2023 11:35 AM CDT -----  Regarding: Self [phone]  Type:  Patient Requesting Referral    Who Called: Self     Referral to What Specialty: Physical Therapy    Reason for Referral: Pt was told she needs a PT first before she is able to see physical medicine and rehab.     Does the patient want the referral with a specific physician?: No    [facility] the [facility]     Would the patient rather a call back or a response via My [facility]?  Call back     Best Call Back Number: [phone]     Additional Information:

## 2023-06-14 DIAGNOSIS — L97.423 LEFT MIDFOOT ULCER, WITH NECROSIS OF MUSCLE: Primary | ICD-10-CM

## 2023-06-15 ENCOUNTER — OFFICE VISIT (OUTPATIENT)
Dept: FAMILY MEDICINE | Facility: CLINIC | Age: 53
End: 2023-06-15
Payer: MEDICAID

## 2023-06-15 VITALS
RESPIRATION RATE: 16 BRPM | OXYGEN SATURATION: 97 % | BODY MASS INDEX: 32.28 KG/M2 | TEMPERATURE: 99 F | HEIGHT: 66 IN | SYSTOLIC BLOOD PRESSURE: 134 MMHG | DIASTOLIC BLOOD PRESSURE: 64 MMHG | HEART RATE: 92 BPM

## 2023-06-15 DIAGNOSIS — E11.42 TYPE 2 DIABETES MELLITUS WITH DIABETIC POLYNEUROPATHY, WITHOUT LONG-TERM CURRENT USE OF INSULIN: Primary | ICD-10-CM

## 2023-06-15 DIAGNOSIS — J44.9 CHRONIC OBSTRUCTIVE PULMONARY DISEASE, UNSPECIFIED COPD TYPE: ICD-10-CM

## 2023-06-15 DIAGNOSIS — E66.01 MORBID OBESITY: ICD-10-CM

## 2023-06-15 DIAGNOSIS — Z89.512 S/P BKA (BELOW KNEE AMPUTATION) UNILATERAL, LEFT: ICD-10-CM

## 2023-06-15 DIAGNOSIS — G54.6 PHANTOM LIMB PAIN: ICD-10-CM

## 2023-06-15 PROCEDURE — 99214 OFFICE O/P EST MOD 30 MIN: CPT | Mod: S$PBB,,, | Performed by: INTERNAL MEDICINE

## 2023-06-15 PROCEDURE — 4010F PR ACE/ARB THEARPY RXD/TAKEN: ICD-10-PCS | Mod: CPTII,,, | Performed by: INTERNAL MEDICINE

## 2023-06-15 PROCEDURE — 3075F PR MOST RECENT SYSTOLIC BLOOD PRESS GE 130-139MM HG: ICD-10-PCS | Mod: CPTII,,, | Performed by: INTERNAL MEDICINE

## 2023-06-15 PROCEDURE — 3051F PR MOST RECENT HEMOGLOBIN A1C LEVEL 7.0 - < 8.0%: ICD-10-PCS | Mod: CPTII,,, | Performed by: INTERNAL MEDICINE

## 2023-06-15 PROCEDURE — 1159F MED LIST DOCD IN RCRD: CPT | Mod: CPTII,,, | Performed by: INTERNAL MEDICINE

## 2023-06-15 PROCEDURE — 3051F HG A1C>EQUAL 7.0%<8.0%: CPT | Mod: CPTII,,, | Performed by: INTERNAL MEDICINE

## 2023-06-15 PROCEDURE — 99999 PR PBB SHADOW E&M-EST. PATIENT-LVL V: CPT | Mod: PBBFAC,,, | Performed by: INTERNAL MEDICINE

## 2023-06-15 PROCEDURE — 99999 PR PBB SHADOW E&M-EST. PATIENT-LVL V: ICD-10-PCS | Mod: PBBFAC,,, | Performed by: INTERNAL MEDICINE

## 2023-06-15 PROCEDURE — 3078F PR MOST RECENT DIASTOLIC BLOOD PRESSURE < 80 MM HG: ICD-10-PCS | Mod: CPTII,,, | Performed by: INTERNAL MEDICINE

## 2023-06-15 PROCEDURE — 99214 PR OFFICE/OUTPT VISIT, EST, LEVL IV, 30-39 MIN: ICD-10-PCS | Mod: S$PBB,,, | Performed by: INTERNAL MEDICINE

## 2023-06-15 PROCEDURE — 3008F BODY MASS INDEX DOCD: CPT | Mod: CPTII,,, | Performed by: INTERNAL MEDICINE

## 2023-06-15 PROCEDURE — 3078F DIAST BP <80 MM HG: CPT | Mod: CPTII,,, | Performed by: INTERNAL MEDICINE

## 2023-06-15 PROCEDURE — 99215 OFFICE O/P EST HI 40 MIN: CPT | Mod: PBBFAC,PN | Performed by: INTERNAL MEDICINE

## 2023-06-15 PROCEDURE — 3075F SYST BP GE 130 - 139MM HG: CPT | Mod: CPTII,,, | Performed by: INTERNAL MEDICINE

## 2023-06-15 PROCEDURE — 1159F PR MEDICATION LIST DOCUMENTED IN MEDICAL RECORD: ICD-10-PCS | Mod: CPTII,,, | Performed by: INTERNAL MEDICINE

## 2023-06-15 PROCEDURE — 3008F PR BODY MASS INDEX (BMI) DOCUMENTED: ICD-10-PCS | Mod: CPTII,,, | Performed by: INTERNAL MEDICINE

## 2023-06-15 PROCEDURE — 4010F ACE/ARB THERAPY RXD/TAKEN: CPT | Mod: CPTII,,, | Performed by: INTERNAL MEDICINE

## 2023-06-15 RX ORDER — GABAPENTIN 300 MG/1
CAPSULE ORAL
Qty: 180 CAPSULE | Refills: 1 | Status: SHIPPED | OUTPATIENT
Start: 2023-06-15 | End: 2023-08-21

## 2023-06-15 RX ORDER — TRAMADOL HYDROCHLORIDE 50 MG/1
50 TABLET ORAL EVERY 8 HOURS PRN
Qty: 30 TABLET | Refills: 0 | Status: SHIPPED | OUTPATIENT
Start: 2023-06-15 | End: 2023-08-21 | Stop reason: SDUPTHER

## 2023-06-15 RX ORDER — SEMAGLUTIDE 1.34 MG/ML
1 INJECTION, SOLUTION SUBCUTANEOUS
Qty: 3 ML | Refills: 11 | Status: SHIPPED | OUTPATIENT
Start: 2023-06-15 | End: 2023-10-19 | Stop reason: SDUPTHER

## 2023-06-15 RX ORDER — METHOCARBAMOL 500 MG/1
500 TABLET, FILM COATED ORAL 3 TIMES DAILY
Qty: 45 TABLET | Refills: 1 | Status: SHIPPED | OUTPATIENT
Start: 2023-06-15 | End: 2023-08-30 | Stop reason: SDUPTHER

## 2023-06-15 NOTE — LETTER
Zee 15, 2023    Audrey Natarajan  [address]  [place] [address]             [facility]  [address] [facility] [address] [address]  [place] [address]  Phone: [phone] To Whom It May Concern:    Ms. Audrey Natarajan (: 1970) is a patient under my care for primary care. She is medically optimized for dental procedure. She should hold her apixiban (ELIQUIS) for three days prior to the dental procedure.     Please don't hesitate to contact me should you have any concerns regarding this matter.      Very Respectfully        Man HEALY.

## 2023-06-15 NOTE — PROGRESS NOTES
Subjective:       Patient ID: Audrey Natarajan is a 52 y.o. female.    Chief Complaint: Diabetes and Hypertension    F/u chronic conditions    HPI: 53 y/o w/ HTN DM SHAWN on CPAP presents alone for follow up. Since last visit with me she had a left BKA for chronic non healing plantar foot wound. She went to inpatient rehab after her surgery and is now back in her own apartment. She has been fitted/measured for a prosthesis and is awaitng delivery of this. She reports no drainage or opening from her ampuatiotn site. She is still dealing with phantom limb pains but these have lessened since her hospital discharge. She contineus to take 600mg of gabapentin three times per day for neruopathy of right foot as well as phantom limb pain on left. She does get intermittent flares of lower back pain for which she takes muscle relaxer as needed. Occasional tramadol for breakthrough pain     Review of Systems   Constitutional:  Negative for activity change, appetite change, fatigue, fever and unexpected weight change.   HENT:  Negative for ear pain, rhinorrhea and sore throat.    Eyes:  Negative for discharge and visual disturbance.   Respiratory:  Negative for chest tightness, shortness of breath and wheezing.    Cardiovascular:  Negative for chest pain, palpitations and leg swelling.   Gastrointestinal:  Negative for abdominal pain, constipation and diarrhea.   Endocrine: Negative for cold intolerance and heat intolerance.   Genitourinary:  Negative for dysuria and hematuria.   Musculoskeletal:  Positive for gait problem. Negative for joint swelling and neck stiffness.   Skin:  Negative for rash.   Neurological:  Negative for dizziness, syncope, weakness and headaches.   Psychiatric/Behavioral:  Negative for suicidal ideas.      Objective:     Vitals:    06/15/23 1139   BP: 134/64   BP Location: Left arm   Patient Position: Sitting   BP Method: X-Large (Manual)   Pulse: 92   Resp: 16   Temp: 98.9 °F (37.2 °C)   TempSrc:  "Oral   SpO2: 97%   Height: 5' 6" (1.676 m)          Physical Exam  Constitutional:       General: She is not in acute distress.     Appearance: She is well-developed. She is obese.   HENT:      Head: Normocephalic and atraumatic.   Eyes:      General: No scleral icterus.     Conjunctiva/sclera: Conjunctivae normal.   Cardiovascular:      Rate and Rhythm: Normal rate and regular rhythm.      Heart sounds: No murmur heard.    No friction rub. No gallop.   Pulmonary:      Effort: Pulmonary effort is normal.      Breath sounds: Normal breath sounds. No wheezing or rales.   Abdominal:      Palpations: Abdomen is soft.      Tenderness: There is no abdominal tenderness. There is no guarding or rebound.   Musculoskeletal:         General: No tenderness. Normal range of motion.      Cervical back: Normal range of motion.   Skin:     General: Skin is warm and dry.      Comments: Left BKA site wheel healed without ulceration or erythema   Neurological:      Mental Status: She is alert and oriented to person, place, and time.      Cranial Nerves: No cranial nerve deficit.       Assessment and Plan   1. Type 2 diabetes mellitus with diabetic polyneuropathy, without long-term current use of insulin  Repeat a1c for quality of control continue basal insulin and glp1 RA  - Comprehensive Metabolic Panel; Future  - Hemoglobin A1C; Future  - Lipid Panel; Future  - semaglutide (OZEMPIC) 1 mg/dose (2 mg/1.5 mL) PnIj; Inject 1 mg into the skin every 7 days.  Dispense: 3 mL; Refill: 11    2. Morbid obesity  The patient is asked to make an attempt to improve diet and exercise patterns to aid in medical management of this problem.      3. Chronic obstructive pulmonary disease, unspecified COPD type  Inhalers prn    4. S/P BKA (below knee amputation) unilateral, left  To follow up with PT once has prosthesis for gait training  - gabapentin (NEURONTIN) 300 MG capsule; Take 2 capsules (600 mg total) by mouth 3 (three) times a day.  Dispense: " 180 capsule; Refill: 1  - methocarbamoL (ROBAXIN) 500 MG Tab; Take 1 tablet (500 mg total) by mouth 3 (three) times daily. Frequency could not be confirmed.  Dispense: 45 tablet; Refill: 1    5. Phantom limb pain  Continue gabapentin tid  - gabapentin (NEURONTIN) 300 MG capsule; Take 2 capsules (600 mg total) by mouth 3 (three) times a day.  Dispense: 180 capsule; Refill: 1

## 2023-06-30 ENCOUNTER — TELEPHONE (OUTPATIENT)
Dept: SURGERY | Facility: CLINIC | Age: 53
End: 2023-06-30
Payer: MEDICAID

## 2023-06-30 NOTE — TELEPHONE ENCOUNTER
Spoke with patient I informed that Dr. Munoz is currently out of the office he will return on 7/3/23 and her message will be forwarded to him.      Patient states verbal understanding.

## 2023-06-30 NOTE — TELEPHONE ENCOUNTER
----- Message from Margarita Goodman sent at 6/30/2023 11:40 AM CDT -----  Type: Patient Call Back    Who called:pt     What is the request in detail:pt requesting to get an order bathroom swivel shower chair. Call pt     Can the clinic reply by [facility]?    Would the patient rather a call back or a response via [facility]? call    Best call back number:[phone] (home)       Additional Information:

## 2023-07-03 ENCOUNTER — EXTERNAL HOME HEALTH (OUTPATIENT)
Dept: HOME HEALTH SERVICES | Facility: HOSPITAL | Age: 53
End: 2023-07-03
Payer: MEDICAID

## 2023-07-06 ENCOUNTER — CLINICAL SUPPORT (OUTPATIENT)
Dept: REHABILITATION | Facility: HOSPITAL | Age: 53
End: 2023-07-06
Attending: SURGERY
Payer: MEDICAID

## 2023-07-06 DIAGNOSIS — L97.423 LEFT MIDFOOT ULCER, WITH NECROSIS OF MUSCLE: ICD-10-CM

## 2023-07-06 DIAGNOSIS — Z74.09 IMPAIRED FUNCTIONAL MOBILITY, BALANCE, GAIT, AND ENDURANCE: ICD-10-CM

## 2023-07-06 PROCEDURE — 97162 PT EVAL MOD COMPLEX 30 MIN: CPT | Mod: PN

## 2023-07-10 ENCOUNTER — TELEPHONE (OUTPATIENT)
Dept: SURGERY | Facility: CLINIC | Age: 53
End: 2023-07-10
Payer: MEDICAID

## 2023-07-10 DIAGNOSIS — E11.42 TYPE 2 DIABETES MELLITUS WITH DIABETIC POLYNEUROPATHY, WITHOUT LONG-TERM CURRENT USE OF INSULIN: ICD-10-CM

## 2023-07-10 DIAGNOSIS — I10 ESSENTIAL HYPERTENSION: ICD-10-CM

## 2023-07-10 RX ORDER — LISINOPRIL 10 MG/1
10 TABLET ORAL DAILY
Qty: 90 TABLET | Refills: 0 | Status: SHIPPED | OUTPATIENT
Start: 2023-07-10 | End: 2023-10-19 | Stop reason: SDUPTHER

## 2023-07-10 NOTE — TELEPHONE ENCOUNTER
----- Message from Lupe Jhaveri sent at 7/10/2023 10:13 AM CDT -----  .Type: Patient Call Back    Who called: Self     What is the request in detail:pt requesting to get an order bathroom swivel shower chair. Call pt     Can the clinic reply by [facility]? No     Would the patient rather a call back or a response via [facility]? Call Back     Best call back number: .[phone] (home)       Additional Information:

## 2023-07-10 NOTE — TELEPHONE ENCOUNTER
Care Due:                  Date            Visit Type   Department     Provider  --------------------------------------------------------------------------------                                [facility]                              PRIMARY      MEDICINE/  Last Visit: 06-      CARE ([facility])   INTERNAL MED   Donaldo Pena                              [facility]                              PRIMARY      MEDICINE/  Next Visit: 10-      CARE ([facility])   INTERNAL MED   Donaldo Pena                                                            Last  Test          Frequency    Reason                     Performed    Due Date  --------------------------------------------------------------------------------    HBA1C.......  6 months...  metFORMIN, semaglutide...  03- 09-    Lipid Panel.  12 months..  pravastatin..............  Not Found    Overdue    [employer] Embedded Care Due Messages. Reference number: [id number].   7/10/2023 2:16:22 PM CDT

## 2023-07-14 ENCOUNTER — CLINICAL SUPPORT (OUTPATIENT)
Dept: REHABILITATION | Facility: HOSPITAL | Age: 53
End: 2023-07-14
Attending: SURGERY
Payer: MEDICAID

## 2023-07-14 DIAGNOSIS — Z74.09 IMPAIRED FUNCTIONAL MOBILITY, BALANCE, GAIT, AND ENDURANCE: Primary | ICD-10-CM

## 2023-07-14 PROCEDURE — 97110 THERAPEUTIC EXERCISES: CPT | Mod: PN

## 2023-07-14 PROCEDURE — 97530 THERAPEUTIC ACTIVITIES: CPT | Mod: PN

## 2023-07-14 NOTE — PROGRESS NOTES
[facility]   Physical Therapy Treatment Note     Name: Audrey Natarajan  Clinic Number: [id number]    Therapy Diagnosis:   Encounter Diagnosis   Name Primary?    Impaired functional mobility, balance, gait, and endurance Yes     Physician: Gabe Munoz MD    Visit Date: 7/14/2023    Physician Orders: PT Eval and Treat, Post surgical   Medical Diagnosis from Referral: L97.423 (ICD-10-CM) - Left midfoot ulcer, with necrosis of muscle  Evaluation Date: 7/6/2023  Authorization Period Expiration: 6/13/24  Plan of Care Expiration: 10/6/23  Visit # / Visits authorized: 2/ 1    Precautions: Standard, Diabetes, and Fall    Time In: 12:00 PM   Time Out: 12:40 PM  Total Billable Time: 40 minutes      SUBJECTIVE     Pt reports: She has not attempted to don prosthesis since last visit.  She was compliant with home exercise program.  Response to previous treatment: No change   Functional change: None    Pain: 0/10  Location: none reported    OBJECTIVE      7/14/23   Tinetti 15 (high fall risk)       Postural control:  MCTSIB:  1. Eyes Open/feet together/Firm: 30 seconds  2. Eyes Closed/feet together/Firm: 6 seconds  3. Eyes Open/feet together/Foam: NT   4. Eyes Closed/feet together/Foam: NT     Gait Assessment:   - AD used: RW  - Assistance: S  - Distance: 62 ft  - Curb: NT  - Ramp:  NT  - Stairs: NT    GAIT DEVIATIONS:  Gait component performance:   Reliance of UE on RW  Decreased step length on R LE   Decreased gait speed  Decreased stance time on L LE  * Above impairments indicate decreased gait safety and increased fall risk.       Evaluation   Timed Up and Go 26+24+27 =   25.6 sec  < 20 sec safe for independent transfers,     < 30 sec assist required for transfers   6 meter walk test NT     Timed Up and Go fall risk:   Community dwelling older adults >13.5 sec   Chronic CVA >14 sec   Geriatric with h/o falls >15 sec   Frail elderly >32.6 sec    LE amputees >19 sec   PD >7.95- 11.5 sec    Hip OA >10 sec   Vestibular >11.1 sec      Tinetti Balance Assessment  Balance:  1. Sitting Balance 1   Leans/ slides in chair= 0   Steady= 1  2. Rises from chair 1   Unable without help= 0   Able, uses arms= 1   Able without use of arms= 2  3. Attempts to rise 0   Unable without help= 0   Able, >1 attmept required-= 1   Able, 1 attempt= 2  4. Immediate standing balance (1st 5 seconds) 2   Unsteady (swaggers, moves feet, trunk sway)= 0   Steady but uses walker or other support= 1   Narrow base of support without walker or support= 2  5. Nudged 2   Begins to fall= 0   Staggers, grabs, catches self= 1   Steady= 2  6. Standing balance 2   Unsteady= 0   Steady but wide JEN or uses AD=1   Narrow JEN without AD=2  7. Eyes closed 0   Unsteady= 0   Steady= 1  8. Turning 360 degrees 0   Discontinuous steps= 0   Continuous steps= 1   Unsteady= 0   Steady= 1  9. Sitting down 1   Unsafe= 0   Uses arms or not in a smooth motion= 1   Safe, smooth motion= 2  Balance score= 9  Gait:  1. Initiation 1   hesitates or multiple attempts to start= 0   No hesitancy= 1  2. Step length 1   Step to= 0   One foot passes= 1   reciprocal pattern= 2  3. Step height 2    Neither foot clears floor= 0   One foot clears floor= 1   Both feet clear floor= 2  4. Step symmetry 0   Not symmetrical= 0   Appears symmetrical= 1  5. Step continuity 0   Not continuous= 0   Appears continuous= 1  6. Path 1   Marked deviation= 0   Mild/moderate deviation or uses A.D.= 1   Straight without A.D.= 2  7. Trunk 1   Marked sway or uses A.D.= 0   No sway, but flexes knees or back, spread arms out while walking= 1   No sway, no flexion, no use of UE, no use of A.D.= 2  8. Walking stance 0   Heels apart= 0   Heels almost touching while walking= 1  Gait score= 6  Total score= 15 , high fall risk    0-18= high fall risk  19-23= moderate fall risk  24-28= low fall risk      TREATMENT     Audrey received the treatments listed below:      therapeutic activities to improve  functional performance for 30 minutes, including:    Donning/doffing prosthesis with min A from therapist    Testing listed above.     Transfers from w/c <> mat with S and RW with and without L prosthesis donned     received therapeutic exercises to develop strength and ROM for 10 minutes including:    Standing hip abduction 2x10 ea LE  Standing hip extension 2x10 ea LE    PATIENT EDUCATION AND HOME EXERCISES     Home Exercises Provided and Patient Education Provided     Education provided:   - standing HEP  - continuing to wear prosthesis for 1 hour at a time, taking off for one hour    Written Home Exercises Provided: Patient instructed to cont prior HEP. Exercises were reviewed and Audrey was able to demonstrate them prior to the end of the session.  Audrey demonstrated good  understanding of the education provided. See EMR under Patient Instructions for exercises provided during therapy sessions    ASSESSMENT     Pt tolerated initial treatment session well with no adverse response noted. Pt able to don L prosthesis this session with min vc/assist from therapist. Testing performed today as unable to capture last visit. Pt TUG score significantly below age related normative data, indicating fall risk. Pt also with Tinetti score of 15 indicating high fall risk. Pt able to demo gait of 62 ft this session with prosthesis, RW, and S from PT. Pt encourage to trial gait at home with RW and supervision when prosthesis is donned. Goals established this session to reflect functional mobility testing. Pt remains appropriate for therapy at this time. Continue gait training and balance challenges next visit as able.     Audrey Is progressing well towards her goals.   Pt prognosis is Good.     Pt will continue to benefit from skilled outpatient physical therapy to address the deficits listed in the problem list box on initial evaluation, provide pt/family education and to maximize pt's level of independence in the home and  community environment.     Pt's spiritual, cultural and educational needs considered and pt agreeable to plan of care and goals.     Anticipated barriers to physical therapy: none    Goals: Short Term Goals- 4 Weeks  Pt to demonstrate independence in performance of HEP in order to improve daily level of physical activity and improve carry over session to session.  Pt to improve B LE strength by > / = 1/2 MMT score in order to improve strength for daily activities.  Pt to score < / = 22 seconds pm TUG in order to decrease fall risk and maximize functional strength for daily activities.  Pt to score > / = 19 on Tinetti in order to improve safety and balance during gait.  Pt to demo gait for > 200 ft with SPC in order to promote return to independence in home and community.        Long Term Goals - 12 Weeks  Pt to demonstrate compliance with HEP > / = 3x per week in order to improve daily level of physical activity and improve carry over session to session.  Pt to improve B LE strength by > / = 1 MMT score in order to improve strength for daily activities.  Pt to demonstrate TUG score of < / = 18 seconds in order to decrease fall risk and maximize functional strength for daily activities.  Pt to score > / = 24 on Tinetti in order to improve safety and balance during gait.  Pt to demo gait for > 200 ft without AD in order to promote return to independence in home and community.   Pt to demonstrate ability to balance for > / = 30 seconds on all MCTSIB conditions with no sway in order to improve vestibular response and decrease fall risk.      PLAN     Continue PT 1-2x per week for 12 weeks.     Continue gait training and dynamic balance.     Anupama Augustin, PT, DPT  7/14/2023

## 2023-07-17 ENCOUNTER — TELEPHONE (OUTPATIENT)
Dept: SURGERY | Facility: CLINIC | Age: 53
End: 2023-07-17
Payer: MEDICAID

## 2023-07-17 ENCOUNTER — TELEPHONE (OUTPATIENT)
Dept: FAMILY MEDICINE | Facility: CLINIC | Age: 53
End: 2023-07-17
Payer: MEDICAID

## 2023-07-17 DIAGNOSIS — N76.0 ACUTE VAGINITIS: Primary | ICD-10-CM

## 2023-07-17 RX ORDER — FLUCONAZOLE 150 MG/1
150 TABLET ORAL DAILY
Qty: 2 TABLET | Refills: 0 | Status: SHIPPED | OUTPATIENT
Start: 2023-07-17 | End: 2023-10-19 | Stop reason: SDUPTHER

## 2023-07-17 NOTE — TELEPHONE ENCOUNTER
Patient was informed that a visit is due in order for provider to prescribe any medication. Patient states that she wanted message sent to doctor because he knows her. I let her know that the message will be sent to the provider and that someone will reach out once the provider advises on what to do. Patient verbalized understanding and then proceeded to hang up on me.

## 2023-07-17 NOTE — TELEPHONE ENCOUNTER
----- Message from Fanny Naa sent at 7/17/2023  1:47 PM CDT -----  Regarding: self [phone]  .Type: Patient Call Back    Who called: self     What is the request in detail: Pt stated that she's still waiting to get info on ordering a swivel shower chair     Can the clinic reply by [facility]? Call back     Would the patient rather a call back or a response via [facility]? Call back     Best call back number: .[phone]

## 2023-07-17 NOTE — TELEPHONE ENCOUNTER
----- Message from Fanny Jacome sent at 7/17/2023  1:49 PM CDT -----  Regarding: self  .Type: Patient Call Back    Who called: self     What is the request in detail:  Pt is requesting a rx for yeast infection to be called in to .  [id number] - [place], [place] [address] AT [address]  [place] [address]  Phone: [phone] Fax: [phone]      Can the clinic reply by [facility]? Call back     Would the patient rather a call back or a response via [facility]? Call back     Best call back number: .[phone]

## 2023-07-17 NOTE — TELEPHONE ENCOUNTER
Spoke with patient, I informed her that Dr. Munoz is currently out of the office, her message will be forwarded.

## 2023-07-18 ENCOUNTER — CLINICAL SUPPORT (OUTPATIENT)
Dept: REHABILITATION | Facility: HOSPITAL | Age: 53
End: 2023-07-18
Attending: SURGERY
Payer: MEDICAID

## 2023-07-18 DIAGNOSIS — Z74.09 IMPAIRED FUNCTIONAL MOBILITY, BALANCE, GAIT, AND ENDURANCE: Primary | ICD-10-CM

## 2023-07-18 PROCEDURE — 97116 GAIT TRAINING THERAPY: CPT | Mod: PN

## 2023-07-18 PROCEDURE — 97530 THERAPEUTIC ACTIVITIES: CPT | Mod: PN

## 2023-07-18 PROCEDURE — 97110 THERAPEUTIC EXERCISES: CPT | Mod: PN

## 2023-07-21 ENCOUNTER — CLINICAL SUPPORT (OUTPATIENT)
Dept: REHABILITATION | Facility: HOSPITAL | Age: 53
End: 2023-07-21
Attending: SURGERY
Payer: MEDICAID

## 2023-07-21 DIAGNOSIS — Z74.09 IMPAIRED FUNCTIONAL MOBILITY, BALANCE, GAIT, AND ENDURANCE: Primary | ICD-10-CM

## 2023-07-21 PROCEDURE — 97110 THERAPEUTIC EXERCISES: CPT | Mod: PN

## 2023-07-21 PROCEDURE — 97116 GAIT TRAINING THERAPY: CPT | Mod: PN

## 2023-07-21 PROCEDURE — 97112 NEUROMUSCULAR REEDUCATION: CPT | Mod: PN

## 2023-07-21 NOTE — PROGRESS NOTES
[facility]   Physical Therapy Treatment Note     Name: Audrey Natarajan  Clinic Number: [id number]    Therapy Diagnosis:   Encounter Diagnosis   Name Primary?    Impaired functional mobility, balance, gait, and endurance Yes     Physician: Gabe Munoz MD    Visit Date: 7/18/2023    Physician Orders: PT Eval and Treat, Post surgical   Medical Diagnosis from Referral: L97.423 (ICD-10-CM) - Left midfoot ulcer, with necrosis of muscle  Evaluation Date: 7/6/2023  Authorization Period Expiration: 6/13/24  Plan of Care Expiration: 10/6/23  Visit # / Visits authorized: 2/ 1    Precautions: Standard, Diabetes, and Fall    Time In: 3:15 PM   Time Out: 3:55 PM  Total Billable Time: 40 minutes      SUBJECTIVE     Pt reports: She has been able to don prosthesis recently. Got smaller  yesterday.    She was compliant with home exercise program.  Response to previous treatment: No change   Functional change: None    Pain: 0/10  Location: none reported    OBJECTIVE     None taken today. See treatment section.    TREATMENT     Audrey received the treatments listed below:      therapeutic activities to improve functional performance for 15 minutes, including:    Donning/doffing prosthesis with min A from therapist    Assessing integrity of skin at anterior L shin post session.     Transfers from w/c <> mat with S and RW with and without L prosthesis donned     received therapeutic exercises to develop strength and ROM for 10 minutes including:    Standing hip abduction 2x10 ea LE  Standing hip extension 2x10 ea LE  Standing mini squats with B UE support 2x10    gait training to improve functional mobility and safety for 15 minutes, including:    Gait with RW, L prosthesis, and S from PT x 100 ft    -2 trials   -vc for larger R step length    Stepping forward and back with R LE (to target) in parallel bars with ONE UE support 2x10      PATIENT EDUCATION AND HOME EXERCISES     Home Exercises  Provided and Patient Education Provided     Education provided:   - standing HEP  - continuing to wear prosthesis for 1 hour at a time, taking off for one hour    Written Home Exercises Provided: Patient instructed to cont prior HEP. Exercises were reviewed and Audrey was able to demonstrate them prior to the end of the session.  Audrey demonstrated good  understanding of the education provided. See EMR under Patient Instructions for exercises provided during therapy sessions    ASSESSMENT     Pt tolerated initial treatment session well with no adverse response noted. Pt able to don L prosthesis this session with min vc/assist from therapist. Session focused on gait training, particularly vc for larger R LE step length during gait. Pt with excellent balance noted during gait with RW. Attempted R LE stepping anterior/retro with only one UE support. Pt with good challenge noted. Pt remains appropriate for therapy at this time. Continue gait training and balance challenges next visit as able.     Audrey Is progressing well towards her goals.   Pt prognosis is Good.     Pt will continue to benefit from skilled outpatient physical therapy to address the deficits listed in the problem list box on initial evaluation, provide pt/family education and to maximize pt's level of independence in the home and community environment.     Pt's spiritual, cultural and educational needs considered and pt agreeable to plan of care and goals.     Anticipated barriers to physical therapy: none    Goals: Short Term Goals- 4 Weeks  Pt to demonstrate independence in performance of HEP in order to improve daily level of physical activity and improve carry over session to session.  Pt to improve B LE strength by > / = 1/2 MMT score in order to improve strength for daily activities.  Pt to score < / = 22 seconds pm TUG in order to decrease fall risk and maximize functional strength for daily activities.  Pt to score > / = 19 on Tinetti in  order to improve safety and balance during gait.  Pt to demo gait for > 200 ft with SPC in order to promote return to independence in home and community.        Long Term Goals - 12 Weeks  Pt to demonstrate compliance with HEP > / = 3x per week in order to improve daily level of physical activity and improve carry over session to session.  Pt to improve B LE strength by > / = 1 MMT score in order to improve strength for daily activities.  Pt to demonstrate TUG score of < / = 18 seconds in order to decrease fall risk and maximize functional strength for daily activities.  Pt to score > / = 24 on Tinetti in order to improve safety and balance during gait.  Pt to demo gait for > 200 ft without AD in order to promote return to independence in home and community.   Pt to demonstrate ability to balance for > / = 30 seconds on all MCTSIB conditions with no sway in order to improve vestibular response and decrease fall risk.      PLAN     Continue PT 1-2x per week for 12 weeks.     Continue gait training and dynamic balance.     Anupama Augustin, PT, DPT  7/18/2023

## 2023-07-21 NOTE — PROGRESS NOTES
[facility]   Physical Therapy Treatment Note     Name: Audrey Natarajan  Clinic Number: [id number]    Therapy Diagnosis:   Encounter Diagnosis   Name Primary?    Impaired functional mobility, balance, gait, and endurance Yes       Physician: Gabe Munoz MD    Visit Date: 7/21/2023    Physician Orders: PT Eval and Treat, Post surgical   Medical Diagnosis from Referral: L97.423 (ICD-10-CM) - Left midfoot ulcer, with necrosis of muscle  Evaluation Date: 7/6/2023  Authorization Period Expiration: 6/13/24  Plan of Care Expiration: 10/6/23  Visit # / Visits authorized: 2/ 1    Precautions: Standard, Diabetes, and Fall    Time In: 1140  Time Out: 1225  Total Billable Time: 45 minutes        SUBJECTIVE     Pt reports:  prosthetist plans to make adjustment to leg Monday.     She was compliant with home exercise program.  Response to previous treatment: No change   Functional change: None    Pain: 4/10  Location: left anterior lower leg - below tibial tuberosity     OBJECTIVE     None taken today. See treatment section.    TREATMENT     Audrey received the treatments listed below:      therapeutic activities to improve functional performance for 5 minutes, including:    Donning/doffing prosthesis with min A from therapist - only needed help with outer neoprene wrinkles    Assessing integrity of skin at anterior L shin pres and post session.     Transfers from w/c <> mat and chair<>wheelchair with S and RW with and without L prosthesis donned     received therapeutic exercises to develop strength and ROM for 10 minutes including:    Standing hip abduction 3x10 ea LE  Standing mini squats 3x10  -cues for more posterior movement/technique  -no upper extremity support final set    gait training to improve functional mobility and safety for 15 minutes, including:    Gait with RW, L prosthesis, and S from PT x50 ft, x 150 ft    -1 trial   4/10 pain only while walking      In parallel bars:     Backwards x 3 laps - light touch hand hold SBA/Supervision   Side stepping  x 3 laps -- light touch hand hold SBA/Supervision     Patient participated in neuromuscular re-education activities to improve: Balance, Coordination, and Proprioception for 15 minutes. The following activities were included:      Narrow base of support upwards soccer ball toss x 10   Staggered stance bilateral leading leg trials with dynamic soccer ball toss upwards x 10 bilateral     Narrow base of support upper trunk twists holding soccer ball x 10   Staggered stance bilateral leading leg trials with upper trunk twists holding soccer ball x 10  bilateral     Left prosthetic foot on Foam roller - knee flexion/extension control - cues to decrease looking at leg and focus on feedback at residual limb/knee joint  Left prosthetic foot on 11lb med ball - clockwise/counter clockwise circles - cues to decrease looking at leg and focus on feedback at residual limb/knee joint            PATIENT EDUCATION AND HOME EXERCISES     Home Exercises Provided and Patient Education Provided     Education provided:   - standing HEP  - continuing to wear prosthesis for 1 hour at a time, taking off for one hour    Written Home Exercises Provided: Patient instructed to cont prior HEP. Exercises were reviewed and Audrey was able to demonstrate them prior to the end of the session.  Audrey demonstrated good  understanding of the education provided. See EMR under Patient Instructions for exercises provided during therapy sessions    ASSESSMENT   Despite pain at residual limb with gait, Audrey did very well with today's session. Prosthetist is aware of pain point and they have an appointment Monday to make an adjustment. She is more independent with donning and doffing limb. She increased gait performance with Rolling Walker (RW) and was able to complete unsupported tasks in parallel bars to work on balance. Continue to progress core strength, balance, and gait  with prosthetic. She remains appropriate for skilled PT. Recommend updating home exercise program with [facility] for amputees A[facility] - strength/endurance moves first.      Audrey Is progressing well towards her goals.   Pt prognosis is Good.     Pt will continue to benefit from skilled outpatient physical therapy to address the deficits listed in the problem list box on initial evaluation, provide pt/family education and to maximize pt's level of independence in the home and community environment.     Pt's spiritual, cultural and educational needs considered and pt agreeable to plan of care and goals.     Anticipated barriers to physical therapy: none    Goals: Short Term Goals- 4 Weeks  Pt to demonstrate independence in performance of HEP in order to improve daily level of physical activity and improve carry over session to session. Ongoing  Pt to improve B LE strength by > / = 1/2 MMT score in order to improve strength for daily activities. Ongoing  Pt to score < / = 22 seconds pm TUG in order to decrease fall risk and maximize functional strength for daily activities.Ongoing  Pt to score > / = 19 on Tinetti in order to improve safety and balance during gait.Ongoing  Pt to demo gait for > 200 ft with SPC in order to promote return to independence in home and community. Ongoing       Long Term Goals - 12 Weeks  Pt to demonstrate compliance with HEP > / = 3x per week in order to improve daily level of physical activity and improve carry over session to session.Ongoing  Pt to improve B LE strength by > / = 1 MMT score in order to improve strength for daily activities.Ongoing  Pt to demonstrate TUG score of < / = 18 seconds in order to decrease fall risk and maximize functional strength for daily activities.Ongoing  Pt to score > / = 24 on Tinetti in order to improve safety and balance during gait.Ongoing  Pt to demo gait for > 200 ft without AD in order to promote return to independence in home and community.  Ongoing  Pt to demonstrate ability to balance for > / = 30 seconds on all MCTSIB conditions with no sway in order to improve vestibular response and decrease fall risk.  Ongoing    PLAN     Continue PT 1-2x per week for 12 weeks.     Continue gait training and dynamic balance. Recommend updating home exercise program with [facility] for amputees Aby - strength/endurance moves first.     Denia Orellana, PT, DPT  7/21/2023

## 2023-07-21 NOTE — PLAN OF CARE
[facility]  Physical Therapy Neurological Rehabilitation Initial Evaluation    Name: Audrey Natarajan  Clinic Number: [id number]    Therapy Diagnosis:   Encounter Diagnoses   Name Primary?    Left midfoot ulcer, with necrosis of muscle     Impaired functional mobility, balance, gait, and endurance      Physician: Gabe Munoz MD    Physician Orders: PT Eval and Treat, Post surgical   Medical Diagnosis from Referral: L97.423 (ICD-10-CM) - Left midfoot ulcer, with necrosis of muscle  Evaluation Date: 7/6/2023  Authorization Period Expiration: 6/13/24  Plan of Care Expiration: 10/6/23  Visit # / Visits authorized: 1/ 1    PN due: 8/6/23    Time In: 1:50 PM  Time Out: 2:25 PM  Total Billable Time: 35 minutes    Precautions: Standard, Diabetes, and Fall    Subjective   Date of onset: L BKA 5/26/23    History of current condition - Audrey reports:   Amputation BKA L in April 2023. Rehab hospital for 2 weeks. States she was fitted for prosthesis by [facility]. Just got new sleeve yesterday. Got leg about a week ago. Walked with rollator and no assistance with prosthetist.      Medical History:   Past Medical History:   Diagnosis Date    ADHD (attention deficit hyperactivity disorder)     Arthritis     Asthma     Bipolar 1 disorder     Cataract     Cigarette smoker     COPD (chronic obstructive pulmonary disease)     Coronary artery disease     A fib    Depression     bipolar manic depresson    Diabetes mellitus     Diabetic foot ulcers     Diabetic neuropathy     DVT of lower extremity, bilateral 07/2013    bilateral LE DVT. [place] filter placed.     Encounter for blood transfusion     History of blood clots 1. Left Leg=2003; 2.Bilateral Groin=Blood Clots= 5 or 6/ 2013 & 7/2013; 3. LLL of Lung=7/2013;  4. Lt. Lower Leg=7/2013.     Pt. had 1st Blood Clot after Vkfrtkqzizce=0670, & Last=2013. [place] Filter= Rt.Lateral Neck.    HTN (hypertension) 06/06/2013    Pt states that  she does not have hypertension    Hypercholesteremia     Irregular heartbeat     Neuromuscular disorder     neuropathy feet    Obese     PE (pulmonary embolism) 07/2013    bilat LE DVT.     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    Medications:   Audrey has a current medication list which includes the following prescription(s): albuterol, albuterol-ipratropium, ammonium lactate, eliquis, aspirin, menthol, bumetanide, cyanocobalamin, divalproex, divalproex, doxycycline, dupixent pen, ferrous sulfate, fluconazole, fluticasone propionate, fluticasone-salmeterol 250-50 mcg/dose, gabapentin, hydroxyzine, hydroxyzine hcl, insulin, insulin lispro, lidocaine, lisinopril, loratadine, metformin, methocarbamol, metoprolol tartrate, multivitamin, nicotine, nystatin, ondansetron, pantoprazole, pantoprazole, pen needle, diabetic, polyethylene glycol, pravastatin, risperidone, ozempic, senna-docusate 8.6-50 mg, tramadol, vitamin e, vyvanse, [DISCONTINUED] diclofenac sodium, [DISCONTINUED] furosemide, and [DISCONTINUED] quetiapine.    Allergies:   Review of patient's allergies indicates:   Allergen Reactions    Morphine Other (See Comments)     Patient had a psychotic episode after taking Morphine  Agitation, hallucinations  Other Reaction(s): Other (See Comments), Other (See Comments)    Patient had a psychotic episode  after taking Morphine    Patient had a psychotic episode after taking Morphine Agitation, hallucinations    Penicillins Anaphylaxis     Tolerated cephalosporins in the past    Januvia [sitagliptin] Hives    Carbamazepine Other (See Comments)     hyponatremia  Other Reaction(s): Other (See Comments)    hyponatremia      Prior Therapy: yes, not for amputation  Social History: 1 story house, lives alone, plyo wood ramp to get in   Falls: no falls since being home    DME: tub/shower combo, bed baths for now, shower chair   Occupation: not working currently  Prior Level of Function: limited in ambulation secondary to L foot wound, pain  Current Level of Function: minimally ambulatory with S using prosthesis and RW    Pain:  Some phantom pain reported to L residual limb    Pts goals: Walk without RW    Objective     - Follows commands: yes   - Speech: no deficits     Mental status: alert, oriented to person, place, and time, normal mood, behavior, speech, dress, motor activity, and thought processes  Appearance: Well groomed  Behavior:  calm, cooperative, and adequate rapport can be established  Attention Span and Concentration:  Normal    Posture Alignment :slouched posture    Skin integrity:  Intact at sight of incision, fully healed    Sensation:  Light Touch: Impaired: B LE, T2DM      RANGE OF MOTION--LOWER EXTREMITIES  (R) LE Hip: normal   Knee: normal   Ankle: normal    (L) LE: Hip: 0 degrees AROM - 10 degrees PROM   Knee: normal - full range extension   Ankle: N/A    Strength: manual muscle test grades below     Upper Extremity Strength  Grossly 4/5    Lower Extremity Strength   RLE LLE   Hip Flexion: 5/5 5/5   Hip Extension:  4/5 4/5   Hip Abduction: 4/5 4/5   Knee Extension: 5/5 5/5   Knee Flexion: 4/5 4/5   Ankle Dorsiflexion: 5/5 N/A   Ankle Plantarflexion: 5/5 N/A          Evaluation   5 times sit-stand Unable to test     Sanchez Unable to test       Gait Assessment:   Unable to assess secondary to inability to  don prosthesis     GAIT DEVIATIONS:  Gait component performance:   Unable to assess secondary to inability to don prosthesis      Evaluation   Timed Up and Go Unable to assess this date  < 20 sec safe for independent transfers,     < 30 sec assist required for transfers   6 meter walk test Unable to assess this date           TREATMENT   Treatment Time In: 00  Treatment Time Out: 00  Total Treatment time separate from Evaluation: 00 minutes    Home Exercises and Patient Education Provided    Education provided:   - contacting prosthetist to discuss limb fluctuation  - continuing supine HEP as able    Written Home Exercises Provided: Patient instructed to cont prior HEP. - from rehab discharge    Assessment   Audrey is a 52 y.o. F referred to outpatient Physical Therapy with a medical diagnosis of L mid foot ulcer s/p L BKA. Pt presents with signs and symptoms consistent with medical diagnoses and reports of impaired gait and limited functional mobility/independence.  Pt tests and measures listed above indicative of limited B LE strength, impaired functional strength/mobility, increased risk for falls, poor balance, and limited functional independence. Pt recently received prosthesis and currently dealing with limb fluctuation preventing donning. Some measures not performed secondary to unable to don prosthesis. Pt educated to contact prosthetist regarding difficulty donning today. Pt would benefit from skilled PT services in order to address listed deficits, decrease fall risk, and maximize functional independence. Pt is motivated to participate in therapy and is in agreement with POC.     Pt prognosis is Good.   Pt will benefit from skilled outpatient Physical Therapy to address the deficits stated above and in the chart below, provide pt/family education, and to maximize pt's level of independence.     Plan of care discussed with patient: Yes  Pt's spiritual, cultural and educational needs considered and patient  is agreeable to the plan of care and goals as stated below:     Anticipated Barriers for therapy: none    Medical Necessity is demonstrated by the following  History  Co-morbidities and personal factors that may impact the plan of care Co-morbidities:   See as above.     Personal Factors:   no deficits     moderate   Examination  Body Structures and Functions, activity limitations and participation restrictions that may impact the plan of care Body Regions:   lower extremities    Body Systems:    gross symmetry  ROM  strength  balance  gait  transfers  transitions  motor control  motor learning    Participation Restrictions:   Ability to participate in gait in community safely    Activity limitations:   Learning and applying knowledge  no deficits    General Tasks and Commands  no deficits    Communication  no deficits    Mobility  walking    Self care  no deficits    Domestic Life  shopping  cooking  doing house work (cleaning house, washing dishes, laundry)  assisting others    Interactions/Relationships  no deficits    Life Areas  no deficits    Community and Social Life  no deficits         moderate   Clinical Presentation evolving clinical presentation with changing clinical characteristics moderate   Decision Making/ Complexity Score: moderate     Short Term Goals- 4 Weeks  Pt to demonstrate independence in performance of HEP in order to improve daily level of physical activity and improve carry over session to session.  Pt to improve B LE strength by > / = 1/2 MMT score in order to improve strength for daily activities.  Assess functional mobility next session and add goals as appropriate.     Long Term Goals - 12 Weeks  Pt to demonstrate compliance with HEP > / = 3x per week in order to improve daily level of physical activity and improve carry over session to session.  Pt to improve B LE strength by > / = 1 MMT score in order to improve strength for daily activities.  Assess functional mobility next session  and add goals as appropriate.     Plan   Plan of care Certification: 7/6/2023 to 10/6/23.    Outpatient Physical Therapy 2 times weekly for 12 weeks to include the following interventions: Gait Training, Manual Therapy, Moist Heat/ Ice, Neuromuscular Re-ed, Patient Education, Self Care, Therapeutic Activities, and Therapeutic Exercise.     Anupama Augustin, PT, DPT  7/6/2023

## 2023-07-25 ENCOUNTER — CLINICAL SUPPORT (OUTPATIENT)
Dept: REHABILITATION | Facility: HOSPITAL | Age: 53
End: 2023-07-25
Attending: SURGERY
Payer: MEDICAID

## 2023-07-25 DIAGNOSIS — Z74.09 IMPAIRED FUNCTIONAL MOBILITY, BALANCE, GAIT, AND ENDURANCE: Primary | ICD-10-CM

## 2023-07-25 PROCEDURE — 97112 NEUROMUSCULAR REEDUCATION: CPT | Mod: PN

## 2023-07-25 PROCEDURE — 97110 THERAPEUTIC EXERCISES: CPT | Mod: PN

## 2023-07-25 NOTE — PROGRESS NOTES
[facility]   Physical Therapy Treatment Note     Name: Audrey Natarajan  Clinic Number: [id number]    Therapy Diagnosis:   Encounter Diagnosis   Name Primary?    Impaired functional mobility, balance, gait, and endurance Yes       Physician: Gabe Munoz MD    Visit Date: 7/25/2023    Physician Orders: PT Eval and Treat, Post surgical   Medical Diagnosis from Referral: L97.423 (ICD-10-CM) - Left midfoot ulcer, with necrosis of muscle  Evaluation Date: 7/6/2023  Authorization Period Expiration: 12/31/23  Plan of Care Expiration: 10/6/23  Visit # / Visits authorized: 4/20    Precautions: Standard, Diabetes, and Fall    Time In: 2:35  Time Out: 3:20  Total Billable Time: 45 minutes        SUBJECTIVE     Audrey reports prosthetist came to her house earlier today to take measurements, and will return later this week to  device for altering.     She was compliant with home exercise program.  Response to previous treatment: No change   Functional change: None    Pain: 0/10  Location: no pain today    OBJECTIVE     None taken today. See treatment section.    TREATMENT     Audrey received the treatments listed below:      Treatment modified today due to patient being unable to jared prosthetic device, secondary to L shin swelling, pain, and device adjustments pending.    received therapeutic exercises to develop strength and ROM for 30 minutes including:  -downloading and review of [employer] for Am[facility]ees cecelia, 15 minutes  -prosthetic management and residual limb assessment, 10 minutes   -sit-to-stands (without prosthetic device) x 5  -standing hip abduction, L abduction only due to prosthetic device unable to be donned; 3 x 10    (Rest breaks provided for RLE stance)       Patient participated in neuromuscular re-education activities to improve: Balance, Coordination, and Proprioception for 15 minutes. The following activities were included:      -standing balance on RLE,  without UE support, with CGA; 4 x 20 seconds  -tall-kneeling on platform with foam pad; x 4 minutes, x 3 minutes  -ball catch outside JEN with tall-kneeling; x 2 minutes      [  gait training to improve functional mobility and safety for - minutes, including:  (not completed today due to high pain levels immediately upon attempting prosthetic device)    Gait with RW, L prosthesis, and S from PT x50 ft, x 150 ft    -1 trial   4/10 pain only while walking      In parallel bars:    Backwards x 3 laps - light touch hand hold SBA/Supervision   Side stepping  x 3 laps -- light touch hand hold SBA/Supervision ]        PATIENT EDUCATION AND HOME EXERCISES     Home Exercises Provided and Patient Education Provided     Education provided:   - standing HEP  - continuing to wear prosthesis for 1 hour at a time, taking off for one hour    Written Home Exercises Provided: Patient instructed to cont prior HEP. Exercises were reviewed and Audrey was able to demonstrate them prior to the end of the session.  Audrey demonstrated good  understanding of the education provided. See EMR under Patient Instructions for exercises provided during therapy sessions    ASSESSMENT   Ms. Natarajan tolerated session well overall. Unfortunately, prosthetic device could not be used today due to immediate high pain-levels with donning and attempt to use. Residual limb shin swelling was moderate, and increased compared to most recent session per patient report. Adjustment by prosthetist is planned and upcoming. Patient demonstrated understanding of [employer] for amputee exercises. She also completed exercises in session well with good form throughout. Standing balance with R single-leg stance (without prosthetic device) was completed with good overall stability. RLE fatigue did occur after a few rounds. Patient was appropriately challenged by tall-kneeling with dynamic control while catching ball with BUE's outside JEN. Plan to return to higher-level  activities when prosthetic device adjustments made. Patient remains appropriate for skilled physical therapy.      Audrey Is progressing well towards her goals.   Pt prognosis is Good.     Pt will continue to benefit from skilled outpatient physical therapy to address the deficits listed in the problem list box on initial evaluation, provide pt/family education and to maximize pt's level of independence in the home and community environment.     Pt's spiritual, cultural and educational needs considered and pt agreeable to plan of care and goals.     Anticipated barriers to physical therapy: none    Goals: Short Term Goals- 4 Weeks  Pt to demonstrate independence in performance of HEP in order to improve daily level of physical activity and improve carry over session to session. Ongoing  Pt to improve B LE strength by > / = 1/2 MMT score in order to improve strength for daily activities. Ongoing  Pt to score < / = 22 seconds pm TUG in order to decrease fall risk and maximize functional strength for daily activities.Ongoing  Pt to score > / = 19 on Tinetti in order to improve safety and balance during gait.Ongoing  Pt to demo gait for > 200 ft with SPC in order to promote return to independence in home and community. Ongoing       Long Term Goals - 12 Weeks  Pt to demonstrate compliance with HEP > / = 3x per week in order to improve daily level of physical activity and improve carry over session to session.Ongoing  Pt to improve B LE strength by > / = 1 MMT score in order to improve strength for daily activities.Ongoing  Pt to demonstrate TUG score of < / = 18 seconds in order to decrease fall risk and maximize functional strength for daily activities.Ongoing  Pt to score > / = 24 on Tinetti in order to improve safety and balance during gait.Ongoing  Pt to demo gait for > 200 ft without AD in order to promote return to independence in home and community. Ongoing  Pt to demonstrate ability to balance for > / = 30  seconds on all MCTSIB conditions with no sway in order to improve vestibular response and decrease fall risk.  Ongoing    PLAN     Continue PT 1-2x per week for 12 weeks.     Continue gait training and dynamic balance. Recommend updating home exercise program with [facility] for amputees Aby - strength/endurance moves first.     Zoe Dumont, PT, DPT  7/25/2023

## 2023-07-31 ENCOUNTER — TELEPHONE (OUTPATIENT)
Dept: SURGERY | Facility: CLINIC | Age: 53
End: 2023-07-31
Payer: MEDICAID

## 2023-07-31 NOTE — TELEPHONE ENCOUNTER
Patient informed message sent to Dr. Munoz, he will not be in clinic until 8/8/23. I asked patient has she tried requesting order from her primary doctor. She states she was told by primary doctors office to request order for swivel shower chair from the surgeon who did the surgery.

## 2023-07-31 NOTE — TELEPHONE ENCOUNTER
----- Message from [place] MA sent at 7/31/2023 11:26 AM CDT -----  Type: Patient Call Back    Who called:Self    What is the request in detail:pt. States she has been waiting for the nurse to call regarding a order for a swivel shower chair.. states she was told she would call once the doctor was back in the office and she hasn't heard anything for a few weeks..     Can the clinic reply by [facility]?No    Would the patient rather a call back or a response via [facility]? yes    Best call back number: [phone] (home)

## 2023-08-01 ENCOUNTER — CLINICAL SUPPORT (OUTPATIENT)
Dept: REHABILITATION | Facility: HOSPITAL | Age: 53
End: 2023-08-01
Attending: SURGERY
Payer: MEDICAID

## 2023-08-01 DIAGNOSIS — Z74.09 IMPAIRED FUNCTIONAL MOBILITY, BALANCE, GAIT, AND ENDURANCE: Primary | ICD-10-CM

## 2023-08-01 PROCEDURE — 97112 NEUROMUSCULAR REEDUCATION: CPT | Mod: PN

## 2023-08-01 PROCEDURE — 97110 THERAPEUTIC EXERCISES: CPT | Mod: PN

## 2023-08-01 PROCEDURE — 97116 GAIT TRAINING THERAPY: CPT | Mod: PN

## 2023-08-01 NOTE — PROGRESS NOTES
[facility]   Physical Therapy Treatment Note     Name: Audrey Natarajan  Clinic Number: [id number]    Therapy Diagnosis:   Encounter Diagnosis   Name Primary?    Impaired functional mobility, balance, gait, and endurance Yes     Physician: Gabe Munoz MD    Visit Date: 8/1/2023    Physician Orders: PT Eval and Treat, Post surgical   Medical Diagnosis from Referral: L97.423 (ICD-10-CM) - Left midfoot ulcer, with necrosis of muscle  Evaluation Date: 7/6/2023  Authorization Period Expiration: 12/31/23  Plan of Care Expiration: 10/6/23  Visit # / Visits authorized: 5/20    Precautions: Standard, Diabetes, and Fall    Time In: 2:35 PM  Time Out: 3:13 PM  Total Billable Time: 38 minutes    SUBJECTIVE     Audrey reports recent adjustments to prosthesis by Champ from [facility]. States this has helped but she is still having some pain at L anterior shin.    She was compliant with home exercise program.  Response to previous treatment: No change   Functional change: None    Pain: 0/10  Location: no pain today    OBJECTIVE     None taken today. See treatment section.    TREATMENT     Audrey received the treatments listed below:       received therapeutic exercises to develop strength and ROM for 38 minutes including:    Donning/doffing prosthesis with min A from therapist - only needed help with outer neoprene wrinkles     Assessing integrity of skin at anterior L shin pres and post session.      Transfers from w/c <> mat and chair<>wheelchair with S and RW with and without L prosthesis donned      Standing hip abduction 3x10 ea LE  Standing mini squats 3x10  -cues for more posterior movement/technique  -no upper extremity support during sets 2 and 3     Gait with RW, L prosthesis, and S from PT x80ft               -1 trial   -4/10 pain only while walking       In parallel bars:    Backwards x 4 laps - light touch hand hold SBA/Supervision   Stepping ant/posterior with R LE - no UE on // bars  3x10  Stepping laterally in/out with R LE - no UE on // bars 3x10    Narrow base of support upwards soccer ball toss x 10   Staggered stance bilateral leading leg trials with dynamic soccer ball toss upwards x 10 bilateral   Staggered stance bilateral leading leg trials with upper trunk twists holding soccer ball x 10  bilateral      Left prosthetic foot on Foam roller - knee flexion/extension control - cues to decrease looking at leg and focus on feedback at residual limb/knee joint  Left prosthetic foot on 11lb med ball - clockwise/counter clockwise circles - cues to decrease looking at leg and focus on feedback at residual limb/knee joint        PATIENT EDUCATION AND HOME EXERCISES     Home Exercises Provided and Patient Education Provided     Education provided:   - standing HEP  - continuing to wear prosthesis for 1 hour at a time, taking off for one hour    Written Home Exercises Provided: Patient instructed to cont prior HEP. Exercises were reviewed and Audrey was able to demonstrate them prior to the end of the session.  Audrey demonstrated good  understanding of the education provided. See EMR under Patient Instructions for exercises provided during therapy sessions    ASSESSMENT   Ms. Natarajan tolerated session well overall. Pt able to don prosthesis independently today. NO pain reported at rest, however, moderate reports of pain to L anterior shin noted with ambulation and SL WB. Pt able to demo anterior and lateral stepping with L WB, no UE support on parallel bars. Overall, pt primarily limited in progress with ambulation/WB secondary to L anterior shin discomfort. Champ from  set to adjust prosthesis this week as needed. Pt remains appropriate for therapy.     Audrey Is progressing well towards her goals.   Pt prognosis is Good.     Pt will continue to benefit from skilled outpatient physical therapy to address the deficits listed in the problem list box on initial evaluation, provide pt/family  education and to maximize pt's level of independence in the home and community environment.     Pt's spiritual, cultural and educational needs considered and pt agreeable to plan of care and goals.     Anticipated barriers to physical therapy: none    Goals: Short Term Goals- 4 Weeks  Pt to demonstrate independence in performance of HEP in order to improve daily level of physical activity and improve carry over session to session. Ongoing  Pt to improve B LE strength by > / = 1/2 MMT score in order to improve strength for daily activities. Ongoing  Pt to score < / = 22 seconds pm TUG in order to decrease fall risk and maximize functional strength for daily activities.Ongoing  Pt to score > / = 19 on Tinetti in order to improve safety and balance during gait.Ongoing  Pt to demo gait for > 200 ft with SPC in order to promote return to independence in home and community. Ongoing       Long Term Goals - 12 Weeks  Pt to demonstrate compliance with HEP > / = 3x per week in order to improve daily level of physical activity and improve carry over session to session.Ongoing  Pt to improve B LE strength by > / = 1 MMT score in order to improve strength for daily activities.Ongoing  Pt to demonstrate TUG score of < / = 18 seconds in order to decrease fall risk and maximize functional strength for daily activities.Ongoing  Pt to score > / = 24 on Tinetti in order to improve safety and balance during gait.Ongoing  Pt to demo gait for > 200 ft without AD in order to promote return to independence in home and community. Ongoing  Pt to demonstrate ability to balance for > / = 30 seconds on all MCTSIB conditions with no sway in order to improve vestibular response and decrease fall risk.  Ongoing    PLAN     Continue PT 1-2x per week for 12 weeks.     Continue gait training and dynamic balance. Recommend updating home exercise program with [facility] [employer] for amputees Aby - strength/endurance moves first.     Anupama Augustin, PT,  DAVIDT  8/1/2023

## 2023-08-02 DIAGNOSIS — E11.42 TYPE 2 DIABETES MELLITUS WITH DIABETIC POLYNEUROPATHY, WITHOUT LONG-TERM CURRENT USE OF INSULIN: ICD-10-CM

## 2023-08-02 RX ORDER — METFORMIN HYDROCHLORIDE 1000 MG/1
1000 TABLET ORAL 2 TIMES DAILY WITH MEALS
Qty: 180 TABLET | Refills: 1 | Status: SHIPPED | OUTPATIENT
Start: 2023-08-02 | End: 2024-01-31

## 2023-08-02 NOTE — TELEPHONE ENCOUNTER
No care due was identified.  [facility] Embedded Care Due Messages. Reference number: [id number].   8/02/2023 5:57:47 AM CDT

## 2023-08-02 NOTE — TELEPHONE ENCOUNTER
Refill Authorization Note     Refill Decision Note   Audrey Natarajan  is requesting a refill authorization.  Brief Assessment and Rationale for Refill:  Approve     Medication Therapy Plan:       Medication Reconciliation Completed: No   Comments:     No Care Gaps recommended.     Note composed:12:06 PM 08/02/2023

## 2023-08-03 ENCOUNTER — CLINICAL SUPPORT (OUTPATIENT)
Dept: REHABILITATION | Facility: HOSPITAL | Age: 53
End: 2023-08-03
Attending: SURGERY
Payer: MEDICAID

## 2023-08-03 DIAGNOSIS — Z74.09 IMPAIRED FUNCTIONAL MOBILITY, BALANCE, GAIT, AND ENDURANCE: Primary | ICD-10-CM

## 2023-08-03 PROCEDURE — 97110 THERAPEUTIC EXERCISES: CPT | Mod: PN

## 2023-08-08 ENCOUNTER — OFFICE VISIT (OUTPATIENT)
Dept: OPTOMETRY | Facility: CLINIC | Age: 53
End: 2023-08-08
Payer: MEDICAID

## 2023-08-08 DIAGNOSIS — H52.4 HYPEROPIA OF BOTH EYES WITH REGULAR ASTIGMATISM AND PRESBYOPIA: ICD-10-CM

## 2023-08-08 DIAGNOSIS — H52.223 HYPEROPIA OF BOTH EYES WITH REGULAR ASTIGMATISM AND PRESBYOPIA: ICD-10-CM

## 2023-08-08 DIAGNOSIS — E11.42 TYPE 2 DIABETES MELLITUS WITH DIABETIC POLYNEUROPATHY, WITHOUT LONG-TERM CURRENT USE OF INSULIN: Primary | ICD-10-CM

## 2023-08-08 DIAGNOSIS — H52.03 HYPEROPIA OF BOTH EYES WITH REGULAR ASTIGMATISM AND PRESBYOPIA: ICD-10-CM

## 2023-08-08 PROCEDURE — 3051F HG A1C>EQUAL 7.0%<8.0%: CPT | Mod: CPTII,,, | Performed by: OPTOMETRIST

## 2023-08-08 PROCEDURE — 92015 DETERMINE REFRACTIVE STATE: CPT | Mod: ,,, | Performed by: OPTOMETRIST

## 2023-08-08 PROCEDURE — 2023F DILAT RTA XM W/O RTNOPTHY: CPT | Mod: CPTII,,, | Performed by: OPTOMETRIST

## 2023-08-08 PROCEDURE — 1159F MED LIST DOCD IN RCRD: CPT | Mod: CPTII,,, | Performed by: OPTOMETRIST

## 2023-08-08 PROCEDURE — 99999 PR PBB SHADOW E&M-EST. PATIENT-LVL IV: CPT | Mod: PBBFAC,,, | Performed by: OPTOMETRIST

## 2023-08-08 PROCEDURE — 1159F PR MEDICATION LIST DOCUMENTED IN MEDICAL RECORD: ICD-10-PCS | Mod: CPTII,,, | Performed by: OPTOMETRIST

## 2023-08-08 PROCEDURE — 99999 PR PBB SHADOW E&M-EST. PATIENT-LVL IV: ICD-10-PCS | Mod: PBBFAC,,, | Performed by: OPTOMETRIST

## 2023-08-08 PROCEDURE — 3051F PR MOST RECENT HEMOGLOBIN A1C LEVEL 7.0 - < 8.0%: ICD-10-PCS | Mod: CPTII,,, | Performed by: OPTOMETRIST

## 2023-08-08 PROCEDURE — 92015 PR REFRACTION: ICD-10-PCS | Mod: ,,, | Performed by: OPTOMETRIST

## 2023-08-08 PROCEDURE — 92014 PR EYE EXAM, EST PATIENT,COMPREHESV: ICD-10-PCS | Mod: S$PBB,,, | Performed by: OPTOMETRIST

## 2023-08-08 PROCEDURE — 2023F PR DILATED RETINAL EXAM W/O EVID OF RETINOPATHY: ICD-10-PCS | Mod: CPTII,,, | Performed by: OPTOMETRIST

## 2023-08-08 PROCEDURE — 92014 COMPRE OPH EXAM EST PT 1/>: CPT | Mod: S$PBB,,, | Performed by: OPTOMETRIST

## 2023-08-08 PROCEDURE — 99214 OFFICE O/P EST MOD 30 MIN: CPT | Mod: PBBFAC,PO | Performed by: OPTOMETRIST

## 2023-08-08 PROCEDURE — 4010F PR ACE/ARB THEARPY RXD/TAKEN: ICD-10-PCS | Mod: CPTII,,, | Performed by: OPTOMETRIST

## 2023-08-08 PROCEDURE — 4010F ACE/ARB THERAPY RXD/TAKEN: CPT | Mod: CPTII,,, | Performed by: OPTOMETRIST

## 2023-08-08 NOTE — PROGRESS NOTES
HPI    Annual Diabetic Eye Exam   Pt states Blurred Vision at near, benefit with current sRx    Pt denies F/F   Pt reports Dry/ Itchy  Gtt: Yes [employer]    Pt reports mild relief     DM Dx'ed   BS: 123  Hemoglobin A1C       Date                     Value               Ref Range             Status                03/15/2023               7.4 (H)             4.0 - 5.6 %           Final                Last edited by Octavio Talavera, OD on 8/8/2023 10:51 AM.            Assessment /Plan     For exam results, see Encounter Report.    Type 2 diabetes mellitus with diabetic polyneuropathy, without long-term current use of insulin  -     Ambulatory referral/consult to Optometry  -No retinopathy noted today.  Continued control with primary care physician and annual comprehensive eye exam.    Hyperopia of both eyes with regular astigmatism and presbyopia  Eyeglass Final Rx       Eyeglass Final Rx         Sphere Cylinder Axis Add    Right +0.50 +1.00 170 +1.75    Left +0.25 +0.50 175 +1.75      Type: PAL    Expiration Date: 8/8/2024                      RTC 1 yr

## 2023-08-15 ENCOUNTER — CLINICAL SUPPORT (OUTPATIENT)
Dept: REHABILITATION | Facility: HOSPITAL | Age: 53
End: 2023-08-15
Payer: MEDICAID

## 2023-08-15 DIAGNOSIS — Z74.09 IMPAIRED FUNCTIONAL MOBILITY, BALANCE, GAIT, AND ENDURANCE: Primary | ICD-10-CM

## 2023-08-15 PROCEDURE — 97110 THERAPEUTIC EXERCISES: CPT | Mod: PN

## 2023-08-15 NOTE — PROGRESS NOTES
[facility]   Physical Therapy Treatment Note     Name: Audrey Natarajan  Clinic Number: [id number]    Therapy Diagnosis:   Encounter Diagnosis   Name Primary?    Impaired functional mobility, balance, gait, and endurance Yes     Physician: Gabe Munoz MD    Visit Date: 8/15/2023    Physician Orders: PT Eval and Treat, Post surgical   Medical Diagnosis from Referral: L97.423 (ICD-10-CM) - Left midfoot ulcer, with necrosis of muscle  Evaluation Date: 7/6/2023  Authorization Period Expiration: 12/31/23  Plan of Care Expiration: 10/6/23  Visit # / Visits authorized: 7/20    Precautions: Standard, Diabetes, and Fall    Time In: 2:35 PM  Time Out: 3:15 PM  Total Billable Time: 40 minutes    SUBJECTIVE     Audrey reports she is doing well. Had prosthesis adjusted by Champ and it has been feeling less painful.     She was compliant with home exercise program.  Response to previous treatment: No change   Functional change: None    Pain: 0/10  Location: L shin    OBJECTIVE     None taken today. See treatment section.    TREATMENT     Audrey received the treatments listed below:       received therapeutic exercises to develop strength and ROM for 40 minutes including:    Donning/doffing prosthesis with supervision from therapist      Assessing integrity of skin at anterior L shin pres and post session.      Transfers from w/c <> mat and chair<>wheelchair with S without L prosthesis donned      Gait with RW, L prosthesis, and S from PT x80ft               -2 trials  -2/10 pain only while walking       In parallel bars:    Backwards x 4 laps - light touch hand hold SBA/Supervision   Stepping ant/posterior with R LE - no UE on // bars 2x10  Stepping laterally in/out with R LE - no UE on // bars 2x10     Narrow base of support upwards soccer ball toss x 10   Staggered stance bilateral leading leg trials with dynamic soccer ball toss upwards x 10 bilateral   Staggered stance bilateral leading leg  trials with upper trunk twists holding soccer ball x 10  bilateral      Left prosthetic foot on 11lb med ball - clockwise/counter clockwise circles - cues to decrease looking at leg and focus on feedback at residual limb/knee joint    PATIENT EDUCATION AND HOME EXERCISES     Home Exercises Provided and Patient Education Provided     Education provided:   - standing HEP  - continuing to wear prosthesis for 1 hour at a time, taking off for one hour    Written Home Exercises Provided: Patient instructed to cont prior HEP. Exercises were reviewed and Audrey was able to demonstrate them prior to the end of the session.  Audrey demonstrated good  understanding of the education provided. See EMR under Patient Instructions for exercises provided during therapy sessions    ASSESSMENT     Ms. Natarajan tolerated session well with no adverse response noted. Less pain reported to L anterior shin and less redness noted at end of session secondary to adjustments made by prosthetist this week. Pt encouraged to continue wearing for 1 hour on and 1 hour off at home at this time. Pt with good dynamic balance noted during stepping without UE support. Plan to continue prosthetic training and gait/dynamic balance activities. Pt remains appropriate for therapy at this time.      Audrey Is progressing well towards her goals.   Pt prognosis is Good.     Pt will continue to benefit from skilled outpatient physical therapy to address the deficits listed in the problem list box on initial evaluation, provide pt/family education and to maximize pt's level of independence in the home and community environment.     Pt's spiritual, cultural and educational needs considered and pt agreeable to plan of care and goals.     Anticipated barriers to physical therapy: none    Goals: Short Term Goals- 4 Weeks  Pt to demonstrate independence in performance of HEP in order to improve daily level of physical activity and improve carry over session to session.  Ongoing  Pt to improve B LE strength by > / = 1/2 MMT score in order to improve strength for daily activities. Ongoing  Pt to score < / = 22 seconds pm TUG in order to decrease fall risk and maximize functional strength for daily activities.Ongoing  Pt to score > / = 19 on Tinetti in order to improve safety and balance during gait.Ongoing  Pt to demo gait for > 200 ft with SPC in order to promote return to independence in home and community. Ongoing       Long Term Goals - 12 Weeks  Pt to demonstrate compliance with HEP > / = 3x per week in order to improve daily level of physical activity and improve carry over session to session.Ongoing  Pt to improve B LE strength by > / = 1 MMT score in order to improve strength for daily activities.Ongoing  Pt to demonstrate TUG score of < / = 18 seconds in order to decrease fall risk and maximize functional strength for daily activities.Ongoing  Pt to score > / = 24 on Tinetti in order to improve safety and balance during gait.Ongoing  Pt to demo gait for > 200 ft without AD in order to promote return to independence in home and community. Ongoing  Pt to demonstrate ability to balance for > / = 30 seconds on all MCTSIB conditions with no sway in order to improve vestibular response and decrease fall risk.  Ongoing    PLAN     Continue PT 1-2x per week for 12 weeks.     Continue gait training and dynamic balance.     Anupama Augustin, PT, DPT  8/15/2023

## 2023-08-17 ENCOUNTER — CLINICAL SUPPORT (OUTPATIENT)
Dept: REHABILITATION | Facility: HOSPITAL | Age: 53
End: 2023-08-17
Payer: MEDICAID

## 2023-08-17 DIAGNOSIS — Z74.09 IMPAIRED FUNCTIONAL MOBILITY, BALANCE, GAIT, AND ENDURANCE: Primary | ICD-10-CM

## 2023-08-17 PROCEDURE — 97110 THERAPEUTIC EXERCISES: CPT | Mod: PN

## 2023-08-17 NOTE — PROGRESS NOTES
[facility]   Physical Therapy Treatment Note     Name: Audrey Natarajan  Clinic Number: [id number]    Therapy Diagnosis:   Encounter Diagnosis   Name Primary?    Impaired functional mobility, balance, gait, and endurance Yes       Physician: Gabe Munoz MD    Visit Date: 8/17/2023    Physician Orders: PT Eval and Treat, Post surgical   Medical Diagnosis from Referral: L97.423 (ICD-10-CM) - Left midfoot ulcer, with necrosis of muscle  Evaluation Date: 7/6/2023  Authorization Period Expiration: 12/31/23  Plan of Care Expiration: 10/6/23  Visit # / Visits authorized: 8/20    Precautions: Standard, Diabetes, and Fall    Time In: 1:05 PM  Time Out: 1:50 PM  Total Billable Time: 45 minutes    SUBJECTIVE     Audrey reports she has worn her prosthesis about 4 hours per day since last session. Less pain overall.     She was compliant with home exercise program.  Response to previous treatment: No change   Functional change: None    Pain: 2/10  Location: L shin    OBJECTIVE     None taken today. See treatment section.    TREATMENT     Audrey received the treatments listed below:       received therapeutic exercises to develop strength and ROM for 45 minutes including:    Donning/doffing prosthesis with supervision from therapist      Assessing integrity of skin at anterior L shin pres and post session.      Transfers from w/c <> mat and chair<>wheelchair with S without L prosthesis donned      Gait with RW, L prosthesis, and S from PT x 80ft   -1 trial  -2/10 pain only while walking      Gait with SPC, L prosthesis, and S from PT x 80 ft  -2 trials  -2/10 pain while walking      In parallel bars:    Walking with one UE support x 3 laps  Backwards x 3 laps - one UE support   Stepping ant/posterior with R LE - no UE on // bars 2x10  Stepping laterally in/out with R LE - no UE on // bars 2x10  Walking with SPC in parallel bars x 4 laps - S from PT     Staggered stance bilateral leading leg  trials with chest press 4# med ball x 10ea LE in front  Staggered stance bilateral leading leg trials with upper trunk twists 4# med ball x 10 ea LE in front     Left prosthetic foot on 11lb med ball - clockwise/counter clockwise circles - cues to decrease looking at leg and focus on feedback at residual limb/knee joint    PATIENT EDUCATION AND HOME EXERCISES     Home Exercises Provided and Patient Education Provided     Education provided:   - standing HEP  - continuing to wear prosthesis for 1 hour at a time, taking off for one hour    Written Home Exercises Provided: Patient instructed to cont prior HEP. Exercises were reviewed and Audrey was able to demonstrate them prior to the end of the session.  Audrey demonstrated good  understanding of the education provided. See EMR under Patient Instructions for exercises provided during therapy sessions    ASSESSMENT     Ms. Natarajan tolerated session well. Trialed walking this session with SPC, pt able to demo safe gait with no LOB. Pt also able to demo one turn today in clinic with no UE/SPC support. Pt with fair L LE control noted during LE circles today. Less pain reported in L LE this week since prosthetist made adjustment to socket. Pt remains appropriate for therapy at this time, progressing very well.     Audrey Is progressing well towards her goals.   Pt prognosis is Good.     Pt will continue to benefit from skilled outpatient physical therapy to address the deficits listed in the problem list box on initial evaluation, provide pt/family education and to maximize pt's level of independence in the home and community environment.     Pt's spiritual, cultural and educational needs considered and pt agreeable to plan of care and goals.     Anticipated barriers to physical therapy: none    Goals: Short Term Goals- 4 Weeks  Pt to demonstrate independence in performance of HEP in order to improve daily level of physical activity and improve carry over session to  session. Ongoing  Pt to improve B LE strength by > / = 1/2 MMT score in order to improve strength for daily activities. Ongoing  Pt to score < / = 22 seconds pm TUG in order to decrease fall risk and maximize functional strength for daily activities.Ongoing  Pt to score > / = 19 on Tinetti in order to improve safety and balance during gait.Ongoing  Pt to demo gait for > 200 ft with SPC in order to promote return to independence in home and community. Ongoing       Long Term Goals - 12 Weeks  Pt to demonstrate compliance with HEP > / = 3x per week in order to improve daily level of physical activity and improve carry over session to session.Ongoing  Pt to improve B LE strength by > / = 1 MMT score in order to improve strength for daily activities.Ongoing  Pt to demonstrate TUG score of < / = 18 seconds in order to decrease fall risk and maximize functional strength for daily activities.Ongoing  Pt to score > / = 24 on Tinetti in order to improve safety and balance during gait.Ongoing  Pt to demo gait for > 200 ft without AD in order to promote return to independence in home and community. Ongoing  Pt to demonstrate ability to balance for > / = 30 seconds on all MCTSIB conditions with no sway in order to improve vestibular response and decrease fall risk.  Ongoing    PLAN     Continue PT 1-2x per week for 12 weeks.     Continue gait training and dynamic balance.     Anupama Augustin, PT, DPT  8/17/2023

## 2023-08-20 DIAGNOSIS — G54.6 PHANTOM LIMB PAIN: ICD-10-CM

## 2023-08-20 NOTE — TELEPHONE ENCOUNTER
No care due was identified.  [facility] Embedded Care Due Messages. Reference number: [id number].   8/20/2023 12:45:32 PM CDT

## 2023-08-21 ENCOUNTER — TELEPHONE (OUTPATIENT)
Dept: SURGERY | Facility: CLINIC | Age: 53
End: 2023-08-21
Payer: MEDICAID

## 2023-08-21 DIAGNOSIS — M79.2 STUMP NEURALGIA: Primary | ICD-10-CM

## 2023-08-21 DIAGNOSIS — G54.6 PHANTOM LIMB PAIN: ICD-10-CM

## 2023-08-21 RX ORDER — GABAPENTIN 300 MG/1
600 CAPSULE ORAL 3 TIMES DAILY
Qty: 180 CAPSULE | Refills: 1 | Status: SHIPPED | OUTPATIENT
Start: 2023-08-21 | End: 2023-10-23

## 2023-08-21 RX ORDER — TRAMADOL HYDROCHLORIDE 50 MG/1
50 TABLET ORAL EVERY 8 HOURS PRN
Qty: 30 TABLET | Refills: 0 | Status: SHIPPED | OUTPATIENT
Start: 2023-08-21 | End: 2023-10-19 | Stop reason: SDUPTHER

## 2023-08-21 NOTE — TELEPHONE ENCOUNTER
----- Message from Nanci De Souza sent at 8/21/2023  3:42 PM CDT -----  Regarding: pt called  Type: Patient Call Back         Who called:SABIHA DUNNE [[id number]]         What is the request in detail: Pt would like to speak with office about swivel chair for shower. Please advise         Can the clinic reply by [facility]? No         Would the patient rather a call back or a response via [facility]? Call back         Best call back number:         Additional Information: Telephone Information:  Mobile          [phone]               Thank you.

## 2023-08-21 NOTE — TELEPHONE ENCOUNTER
----- Message from Lupe Jhaveri sent at 8/21/2023  3:34 PM CDT -----  .Type: RX Refill Request    Who Called: Self     Have you contacted your pharmacy: No     Refill or New Rx: New Rx    RX Name and Strength:traMADol (ULTRAM) 50 mg tablet    Preferred Pharmacy with phone number:.  [id number] [address] AT [address] [address]  Phone: [phone] Fax: [phone]    Local or Mail Order: Local     Ordering Provider: LEVON Pena    Would the patient rather a call back or a response via [facility]? Call Back     Best Call Back Number: .[phone] (home)       Additional Information:

## 2023-08-21 NOTE — TELEPHONE ENCOUNTER
No care due was identified.  [facility] Embedded Care Due Messages. Reference number: [id number].   8/21/2023 3:49:21 PM CDT

## 2023-08-21 NOTE — TELEPHONE ENCOUNTER
Spoke with patient. I informed her that the office nurse put in order for bath/ shower chair and she faxed to [facility] .

## 2023-08-21 NOTE — TELEPHONE ENCOUNTER
----- Message from Nanci De Souza sent at 8/21/2023  3:42 PM CDT -----  Regarding: pt called  Type: Patient Call Back         Who called:SABIHA DUNNE [[id number]]         What is the request in detail: Pt would like to speak with office about swivel chair for shower. Please advise         Can the clinic reply by [facility]? No         Would the patient rather a call back or a response via [facility]? Call back         Best call back number:         Additional Information: Telephone Information:  Mobile          [phone]               Thank you.

## 2023-08-22 ENCOUNTER — CLINICAL SUPPORT (OUTPATIENT)
Dept: REHABILITATION | Facility: HOSPITAL | Age: 53
End: 2023-08-22
Payer: MEDICAID

## 2023-08-22 DIAGNOSIS — Z74.09 IMPAIRED FUNCTIONAL MOBILITY, BALANCE, GAIT, AND ENDURANCE: Primary | ICD-10-CM

## 2023-08-22 PROCEDURE — 97110 THERAPEUTIC EXERCISES: CPT | Mod: PN

## 2023-08-24 ENCOUNTER — CLINICAL SUPPORT (OUTPATIENT)
Dept: REHABILITATION | Facility: HOSPITAL | Age: 53
End: 2023-08-24
Payer: MEDICAID

## 2023-08-24 DIAGNOSIS — Z74.09 IMPAIRED FUNCTIONAL MOBILITY, BALANCE, GAIT, AND ENDURANCE: Primary | ICD-10-CM

## 2023-08-24 PROCEDURE — 97110 THERAPEUTIC EXERCISES: CPT | Mod: PN

## 2023-08-24 NOTE — PROGRESS NOTES
[facility]   Physical Therapy Treatment Note     Name: Audrey Natarajan  Clinic Number: [id number]    Therapy Diagnosis:   Encounter Diagnosis   Name Primary?    Impaired functional mobility, balance, gait, and endurance Yes       Physician: Gabe Munoz MD    Visit Date: 8/24/2023    Physician Orders: PT Eval and Treat, Post surgical   Medical Diagnosis from Referral: L97.423 (ICD-10-CM) - Left midfoot ulcer, with necrosis of muscle  Evaluation Date: 7/6/2023  Authorization Period Expiration: 12/31/23  Plan of Care Expiration: 10/6/23  Visit # / Visits authorized: 10/20    Precautions: Standard, Diabetes, and Fall    Time In: 2:30  Time Out: 3:15  Total Billable Time: 45 minutes    SUBJECTIVE     Audrey reports only slight pain to lateral, distal residual limb occasionally with ambulating.    She was compliant with home exercise program.  Response to previous treatment: No change   Functional change: None    Pain: 2/10  Location: L lateral shin    OBJECTIVE     None taken today. See treatment section.    TREATMENT     Audrey received the treatments listed below:       received therapeutic exercises to develop strength and ROM for 45 minutes including:     Assessing integrity of skin at anterior L shin pres and post session.     Nu-step with BLEs and BUEs, Level 2.5- x 6 minutes    Gait with SPC, L prosthesis, and Spv - x 40ft, x 30ft    Gait without UE support, inside parallel bars:     -with SBA     -9ft x 6 rounds     -9ft x 8 rounds    Turning 180 degrees without UE support, inside parallel bars- x 12     -with close SBA    Stepping ant/posterior with R LE - no UE on // bars- x 10     -pain increase to L lateral shin    Side-stepping without UE support, inside parallel bars- 9ft x 4 rounds     -cues for maintaining equal left-right LE weight-bearing/ avoiding pelvic translation compensation         PATIENT EDUCATION AND HOME EXERCISES     Home Exercises Provided and Patient  Education Provided     Education provided:   - standing HEP  - continuing to wear prosthesis for 1 hour at a time, taking off for one hour    Written Home Exercises Provided: Patient instructed to cont prior HEP. Exercises were reviewed and Audrey was able to demonstrate them prior to the end of the session.  Audrey demonstrated good  understanding of the education provided. See EMR under Patient Instructions for exercises provided during therapy sessions    ASSESSMENT     Ms. Natarajan tolerated session well. She continued to complete gait with SPC with good form and stability. Gait without UE support inside parallel bars also completed well showing very good progression overall. LE mild fatigue and slight increase in residual limb site pain did onset toward end of session; and rest breaks increased as needed. Patient demonstrated good LE and lateral stability with side-stepping without UE support. She remains appropriate for skilled physical therapy.     Audrey Is progressing well towards her goals.   Pt prognosis is Good.     Pt will continue to benefit from skilled outpatient physical therapy to address the deficits listed in the problem list box on initial evaluation, provide pt/family education and to maximize pt's level of independence in the home and community environment.     Pt's spiritual, cultural and educational needs considered and pt agreeable to plan of care and goals.     Anticipated barriers to physical therapy: none    Goals: Short Term Goals- 4 Weeks  Pt to demonstrate independence in performance of HEP in order to improve daily level of physical activity and improve carry over session to session. Ongoing  Pt to improve B LE strength by > / = 1/2 MMT score in order to improve strength for daily activities. Ongoing  Pt to score < / = 22 seconds pm TUG in order to decrease fall risk and maximize functional strength for daily activities.Ongoing  Pt to score > / = 19 on Tinetti in order to improve  safety and balance during gait.Ongoing  Pt to demo gait for > 200 ft with SPC in order to promote return to independence in home and community. Ongoing       Long Term Goals - 12 Weeks  Pt to demonstrate compliance with HEP > / = 3x per week in order to improve daily level of physical activity and improve carry over session to session.Ongoing  Pt to improve B LE strength by > / = 1 MMT score in order to improve strength for daily activities.Ongoing  Pt to demonstrate TUG score of < / = 18 seconds in order to decrease fall risk and maximize functional strength for daily activities.Ongoing  Pt to score > / = 24 on Tinetti in order to improve safety and balance during gait.Ongoing  Pt to demo gait for > 200 ft without AD in order to promote return to independence in home and community. Ongoing  Pt to demonstrate ability to balance for > / = 30 seconds on all MCTSIB conditions with no sway in order to improve vestibular response and decrease fall risk.  Ongoing    PLAN     Continue PT 1-2x per week for 12 weeks.     Continue gait training and dynamic balance.     Zoe Dumont, PT, DPT  8/24/2023

## 2023-08-25 DIAGNOSIS — Z89.512 S/P BKA (BELOW KNEE AMPUTATION) UNILATERAL, LEFT: ICD-10-CM

## 2023-08-25 NOTE — TELEPHONE ENCOUNTER
No care due was identified.  Health [facility] Embedded Care Due Messages. Reference number: [id number].   8/25/2023 4:30:35 PM CDT

## 2023-08-28 RX ORDER — METHOCARBAMOL 500 MG/1
TABLET, FILM COATED ORAL
Qty: 45 TABLET | Refills: 1 | OUTPATIENT
Start: 2023-08-28

## 2023-08-29 ENCOUNTER — TELEPHONE (OUTPATIENT)
Dept: FAMILY MEDICINE | Facility: CLINIC | Age: 53
End: 2023-08-29
Payer: MEDICAID

## 2023-08-29 ENCOUNTER — CLINICAL SUPPORT (OUTPATIENT)
Dept: REHABILITATION | Facility: HOSPITAL | Age: 53
End: 2023-08-29
Payer: MEDICAID

## 2023-08-29 DIAGNOSIS — Z89.512 S/P BKA (BELOW KNEE AMPUTATION) UNILATERAL, LEFT: Primary | ICD-10-CM

## 2023-08-29 DIAGNOSIS — Z74.09 IMPAIRED FUNCTIONAL MOBILITY, BALANCE, GAIT, AND ENDURANCE: Primary | ICD-10-CM

## 2023-08-29 PROCEDURE — 97110 THERAPEUTIC EXERCISES: CPT | Mod: PN

## 2023-08-29 NOTE — TELEPHONE ENCOUNTER
----- Message from Anupama Augustin, PT sent at 8/29/2023  3:06 PM CDT -----  Regarding: Asaf Khan Afternoon,    I am currently working with your pt Audrey Natarajan in physical therapy for training with her L prosthesis. She is progressing very well and I think she would benefit from a single point cane in order to improve her balance/safety. Would it be possible for you to put in an order for a single point cane? Let me know if there are any questions!    Bria

## 2023-08-30 DIAGNOSIS — K21.00 GASTROESOPHAGEAL REFLUX DISEASE WITH ESOPHAGITIS WITHOUT HEMORRHAGE: ICD-10-CM

## 2023-08-30 DIAGNOSIS — Z89.512 S/P BKA (BELOW KNEE AMPUTATION) UNILATERAL, LEFT: ICD-10-CM

## 2023-08-30 RX ORDER — PANTOPRAZOLE SODIUM 40 MG/1
40 TABLET, DELAYED RELEASE ORAL DAILY
Qty: 30 TABLET | Refills: 1 | Status: SHIPPED | OUTPATIENT
Start: 2023-08-30 | End: 2023-10-28

## 2023-08-30 RX ORDER — LIDOCAINE 50 MG/G
1 PATCH TOPICAL DAILY
Qty: 15 PATCH | Refills: 0 | Status: SHIPPED | OUTPATIENT
Start: 2023-08-30 | End: 2023-12-26

## 2023-08-30 RX ORDER — METHOCARBAMOL 500 MG/1
500 TABLET, FILM COATED ORAL 3 TIMES DAILY
Qty: 45 TABLET | Refills: 1 | Status: SHIPPED | OUTPATIENT
Start: 2023-08-30 | End: 2023-11-06

## 2023-08-30 RX ORDER — LIDOCAINE 50 MG/G
1 PATCH TOPICAL DAILY
Refills: 0 | Status: CANCELLED
Start: 2023-08-30

## 2023-08-30 NOTE — TELEPHONE ENCOUNTER
----- Message from Veronique Paniagua sent at 8/30/2023  4:18 PM CDT -----  Regarding: [id number]  Type: RX Refill Request    Who Called: self     Have you contacted your pharmacy: yes     Refill or New Rx:New Rx    RX Name and Strength:LIDOcaine (LIDODERM) 5 % , pantoprazole (PROTONIX) 40 MG tablet        Preferred Pharmacy with phone number:   [facility] #[id number] [address] AT [address] [address]  Phone: [phone] Fax: [phone]             Local or Mail Order: local     Ordering Provider:Becky     Would the patient rather a call back or a response via [facility]? Call back     Best Call Back Number:[phone]       Additional Information: Pt states that the pharmacy states they did not receive the two prescriptions listed above. Sand that the pharmacy said they also needs a prior authorization for the pantoprazole because the insurance tried to make her pay for it .

## 2023-08-30 NOTE — TELEPHONE ENCOUNTER
No care due was identified.  Health [facility] Embedded Care Due Messages. Reference number: [id number].   8/30/2023 10:15:48 AM CDT

## 2023-08-30 NOTE — TELEPHONE ENCOUNTER
----- Message from Laury Diaz sent at 8/30/2023  9:37 AM CDT -----  Type: RX Refill Request    Who Called: Self    Have you contacted your pharmacy:No    Refill or New Rx:Refill    RX Name and Strength:LIDOcaine (LIDODERM) 5 %, pantoprazole (PROTONIX) 40 MG tablet, methocarbamoL (ROBAXIN) 500 MG Tab            Preferred Pharmacy with phone number:[facility] #[id number] [address] AT [facility]   Phone:  [phone]  Fax:  [phone]          Local or Mail Order:Local    Would the patient rather a call back or a response via [facility]? Call    [phone] (home)       Additional Information:

## 2023-08-31 NOTE — PROGRESS NOTES
[facility]   Physical Therapy Treatment Note     Name: Audrey Natarajan  Clinic Number: [id number]    Therapy Diagnosis:   Encounter Diagnosis   Name Primary?    Impaired functional mobility, balance, gait, and endurance Yes     Physician: Gabe Munoz MD    Visit Date: 8/29/2023    Physician Orders: PT Eval and Treat, Post surgical   Medical Diagnosis from Referral: L97.423 (ICD-10-CM) - Left midfoot ulcer, with necrosis of muscle  Evaluation Date: 7/6/2023  Authorization Period Expiration: 12/31/23  Plan of Care Expiration: 10/6/23  Visit # / Visits authorized: 11/20    Precautions: Standard, Diabetes, and Fall    Time In: 2:15 PM  Time Out: 3:00 PM  Total Billable Time: 45 minutes    SUBJECTIVE     Audrey reports she has worn her prosthesis about 4 hours per day since last session. Less pain overall.     She was compliant with home exercise program.  Response to previous treatment: No change   Functional change: None    Pain: 2/10  Location: L shin    OBJECTIVE     None taken today. See treatment section.    TREATMENT     Audrey received the treatments listed below:       received therapeutic exercises to develop strength and ROM for 45 minutes including:    Donning/doffing prosthesis with supervision from therapist      Assessing integrity of skin at anterior L shin pre and post session.      Transfers from w/c <> mat and chair<>wheelchair with S without L prosthesis donned      Gait with SPC, L prosthesis, and S from PT x 80 ft  -2 trials  -2/10 pain while walking     Gait with no AD, L prosthesis, and S from PT x 80 ft   -2 trials  -2/10 pain while walking     In parallel bars:    Walking with no UE support x 3 laps - 2 trials  Backwards x 3 laps - no UE support   Stepping ant/posterior with R LE - no UE on // bars 2x10  Stepping laterally in/out with R LE - no UE on // bars 2x10  Mini Squats 2x10 - no UE support     Staggered stance bilateral leading leg trials with chest  press 4# med ball x 10ea LE in front  Staggered stance bilateral leading leg trials with upper trunk twists 4# med ball x 10 ea LE in front    PATIENT EDUCATION AND HOME EXERCISES     Home Exercises Provided and Patient Education Provided     Education provided:   - standing HEP  - continuing to wear prosthesis for 1 hour at a time, taking off for one hour    Written Home Exercises Provided: Patient instructed to cont prior HEP. Exercises were reviewed and Audrey was able to demonstrate them prior to the end of the session.  Audrey demonstrated good  understanding of the education provided. See EMR under Patient Instructions for exercises provided during therapy sessions    ASSESSMENT     Ms. Natarajan tolerated session well. Continued walking this session with SPC in clinic, pt able to demo safe gait with no LOB. Pt also able to demo gait in clinic with no AD this session, good stability and gait mechanics noted. Less pain reported in L LE this week since prosthetist made adjustment to socket. Pt remains appropriate for therapy at this time, progressing very well.     Audrey Is progressing well towards her goals.   Pt prognosis is Good.     Pt will continue to benefit from skilled outpatient physical therapy to address the deficits listed in the problem list box on initial evaluation, provide pt/family education and to maximize pt's level of independence in the home and community environment.     Pt's spiritual, cultural and educational needs considered and pt agreeable to plan of care and goals.     Anticipated barriers to physical therapy: none    Goals: Short Term Goals- 4 Weeks  Pt to demonstrate independence in performance of HEP in order to improve daily level of physical activity and improve carry over session to session. Ongoing  Pt to improve B LE strength by > / = 1/2 MMT score in order to improve strength for daily activities. Ongoing  Pt to score < / = 22 seconds pm TUG in order to decrease fall risk and  maximize functional strength for daily activities.Ongoing  Pt to score > / = 19 on Tinetti in order to improve safety and balance during gait.Ongoing  Pt to demo gait for > 200 ft with SPC in order to promote return to independence in home and community. Ongoing       Long Term Goals - 12 Weeks  Pt to demonstrate compliance with HEP > / = 3x per week in order to improve daily level of physical activity and improve carry over session to session.Ongoing  Pt to improve B LE strength by > / = 1 MMT score in order to improve strength for daily activities.Ongoing  Pt to demonstrate TUG score of < / = 18 seconds in order to decrease fall risk and maximize functional strength for daily activities.Ongoing  Pt to score > / = 24 on Tinetti in order to improve safety and balance during gait.Ongoing  Pt to demo gait for > 200 ft without AD in order to promote return to independence in home and community. Ongoing  Pt to demonstrate ability to balance for > / = 30 seconds on all MCTSIB conditions with no sway in order to improve vestibular response and decrease fall risk.  Ongoing    PLAN     Continue PT 1-2x per week for 12 weeks.     Continue gait training and dynamic balance.     Anupama Augustin, PT, DPT  8/29/2023

## 2023-08-31 NOTE — PROGRESS NOTES
[facility]   Physical Therapy Treatment Note     Name: Audrey Natarajan  Clinic Number: [id number]    Therapy Diagnosis:   Encounter Diagnosis   Name Primary?    Impaired functional mobility, balance, gait, and endurance Yes       Physician: Gabe Munoz MD    Visit Date: 8/22/2023    Physician Orders: PT Eval and Treat, Post surgical   Medical Diagnosis from Referral: L97.423 (ICD-10-CM) - Left midfoot ulcer, with necrosis of muscle  Evaluation Date: 7/6/2023  Authorization Period Expiration: 12/31/23  Plan of Care Expiration: 10/6/23  Visit # / Visits authorized: 9/20    Precautions: Standard, Diabetes, and Fall    Time In: 2:00 PM  Time Out: 2:40 PM  Total Billable Time: 40 minutes    SUBJECTIVE     Audrey reports she has worn her prosthesis about 4 hours per day since last session. Less pain overall.     She was compliant with home exercise program.  Response to previous treatment: No change   Functional change: None    Pain: 2/10  Location: L shin    OBJECTIVE     None taken today. See treatment section.    TREATMENT     Audrey received the treatments listed below:       received therapeutic exercises to develop strength and ROM for 40 minutes including:    Donning/doffing prosthesis with supervision from therapist      Assessing integrity of skin at anterior L shin pre and post session.      Transfers from w/c <> mat and chair<>wheelchair with S without L prosthesis donned      Gait with SPC, L prosthesis, and S from PT x 80 ft  -2 trials  -2/10 pain while walking      In parallel bars:    Walking with no UE support x 3 laps - 2 trials  Backwards x 3 laps - one UE support   Stepping ant/posterior with R LE - no UE on // bars 2x10  Stepping laterally in/out with R LE - no UE on // bars 2x10     Staggered stance bilateral leading leg trials with chest press 4# med ball x 10ea LE in front  Staggered stance bilateral leading leg trials with upper trunk twists 4# med ball x 10 ea  LE in front     Left prosthetic foot on 11lb med ball - clockwise/counter clockwise circles - cues to decrease looking at leg and focus on feedback at residual limb/knee joint    PATIENT EDUCATION AND HOME EXERCISES     Home Exercises Provided and Patient Education Provided     Education provided:   - standing HEP  - continuing to wear prosthesis for 1 hour at a time, taking off for one hour    Written Home Exercises Provided: Patient instructed to cont prior HEP. Exercises were reviewed and Audrey was able to demonstrate them prior to the end of the session.  Audrey demonstrated good  understanding of the education provided. See EMR under Patient Instructions for exercises provided during therapy sessions    ASSESSMENT     Ms. Natarajan tolerated session well. Continued walking this session with SPC in clinic, pt able to demo safe gait with no LOB. Pt al;so trialed gait with no AD in parallel bars this session, good stability and gait mechanics noted. Pt with fair L LE control noted during LE circles today. Less pain reported in L LE this week since prosthetist made adjustment to socket. Pt remains appropriate for therapy at this time, progressing very well.     Audrey Is progressing well towards her goals.   Pt prognosis is Good.     Pt will continue to benefit from skilled outpatient physical therapy to address the deficits listed in the problem list box on initial evaluation, provide pt/family education and to maximize pt's level of independence in the home and community environment.     Pt's spiritual, cultural and educational needs considered and pt agreeable to plan of care and goals.     Anticipated barriers to physical therapy: none    Goals: Short Term Goals- 4 Weeks  Pt to demonstrate independence in performance of HEP in order to improve daily level of physical activity and improve carry over session to session. Ongoing  Pt to improve B LE strength by > / = 1/2 MMT score in order to improve strength for  daily activities. Ongoing  Pt to score < / = 22 seconds pm TUG in order to decrease fall risk and maximize functional strength for daily activities.Ongoing  Pt to score > / = 19 on Tinetti in order to improve safety and balance during gait.Ongoing  Pt to demo gait for > 200 ft with SPC in order to promote return to independence in home and community. Ongoing       Long Term Goals - 12 Weeks  Pt to demonstrate compliance with HEP > / = 3x per week in order to improve daily level of physical activity and improve carry over session to session.Ongoing  Pt to improve B LE strength by > / = 1 MMT score in order to improve strength for daily activities.Ongoing  Pt to demonstrate TUG score of < / = 18 seconds in order to decrease fall risk and maximize functional strength for daily activities.Ongoing  Pt to score > / = 24 on Tinetti in order to improve safety and balance during gait.Ongoing  Pt to demo gait for > 200 ft without AD in order to promote return to independence in home and community. Ongoing  Pt to demonstrate ability to balance for > / = 30 seconds on all MCTSIB conditions with no sway in order to improve vestibular response and decrease fall risk.  Ongoing    PLAN     Continue PT 1-2x per week for 12 weeks.     Continue gait training and dynamic balance.     Anupama Augustin, PT, DPT  8/22/2023

## 2023-09-07 ENCOUNTER — CLINICAL SUPPORT (OUTPATIENT)
Dept: REHABILITATION | Facility: HOSPITAL | Age: 53
End: 2023-09-07
Payer: MEDICAID

## 2023-09-07 DIAGNOSIS — Z74.09 IMPAIRED FUNCTIONAL MOBILITY, BALANCE, GAIT, AND ENDURANCE: Primary | ICD-10-CM

## 2023-09-07 PROCEDURE — 97110 THERAPEUTIC EXERCISES: CPT | Mod: PN

## 2023-09-08 ENCOUNTER — TELEPHONE (OUTPATIENT)
Dept: FAMILY MEDICINE | Facility: CLINIC | Age: 53
End: 2023-09-08
Payer: MEDICAID

## 2023-09-08 NOTE — TELEPHONE ENCOUNTER
Returned patient's call pertaining to PA. Patient stated that pharmacy informed her that she needed a PA. I informed patient that PA had been initiated on 8/31 and that I would call the pharmacy to gain clarity. I then noticed that the PA had been approved since 9/1 and pharmacy was given this info. The pharmacy sent the script back through and I was told that medication would be filled and that patient could  today. Patient has been informed that she can  her medication on today per pharmacy. Patient verbalized understanding.

## 2023-09-08 NOTE — TELEPHONE ENCOUNTER
----- Message from Sara Rodriguez sent at 9/8/2023 10:02 AM CDT -----  Type: Patient Call Back    Who called:Self     What is the request in detail: PT said this is her 4th time calling in regards to this medication PLEASE give PT a call , PT says she need a prior auth // pantoprazole (PROTONIX) 40 MG tablet 30 tablet     Can the clinic reply by [facility]? No     Would the patient rather a call back or a response via [facility]? CALL     Best call back number: [phone] (home)

## 2023-09-12 ENCOUNTER — CLINICAL SUPPORT (OUTPATIENT)
Dept: REHABILITATION | Facility: HOSPITAL | Age: 53
End: 2023-09-12
Payer: MEDICAID

## 2023-09-12 DIAGNOSIS — Z74.09 IMPAIRED FUNCTIONAL MOBILITY, BALANCE, GAIT, AND ENDURANCE: Primary | ICD-10-CM

## 2023-09-12 PROCEDURE — 97110 THERAPEUTIC EXERCISES: CPT | Mod: PN

## 2023-09-12 NOTE — PROGRESS NOTES
[facility]   Physical Therapy Treatment Note     Name: Audrey Natarajan  Clinic Number: [id number]    Therapy Diagnosis:   Encounter Diagnosis   Name Primary?    Impaired functional mobility, balance, gait, and endurance Yes     Physician: Gabe Munoz MD    Visit Date: 9/12/2023    Physician Orders: PT Eval and Treat, Post surgical   Medical Diagnosis from Referral: L97.423 (ICD-10-CM) - Left midfoot ulcer, with necrosis of muscle  Evaluation Date: 7/6/2023  Authorization Period Expiration: 12/31/23  Plan of Care Expiration: 10/6/23  Visit # / Visits authorized: 13/20    Precautions: Standard, Diabetes, and Fall    Time In: 3:36 (patient in restroom)  Time Out: 4:18  Total Billable Time: 42 minutes    SUBJECTIVE     Audrey reports that walking at home without AD intermittently is still going well. Same status of time with prosthetic device donned.    She was compliant with home exercise program.  Response to previous treatment: No change   Functional change: None    Pain: 3/10  Location: L lateral shin    OBJECTIVE     None taken today. See treatment section.    TREATMENT     Audrey received the treatments listed below:       received therapeutic exercises to develop strength and ROM for 42 minutes including:     Assessing integrity of skin at anterior L shin pre and post session.      In parallel bars:      -backwards gait with cues for step-through pattern and increased L knee-extension      -with 1UE support- 9ft x 4 rounds     -without UEs, with CGA to SBA- 9ft x 10 rounds     -L biased staggered stance with L weight-shift hold, without UE support       -x 20 seconds       -x 45 seconds       -x 38 seconds       -x 27 seconds    -side-stepping with equal left-right weight-bearing- 9ft x 6 rounds      -inside // but without UE support      -cues needed throughout for reducing L foot forward placement     Gait with no AD, L prosthesis, and CGA to SBA from PT x 20ft, x 25ft, x  50ft    Stairs, with 4-inch steps:     -up/down 3 steps with B handrails x 4 rounds (close SBA)     -up/down 3 steps with 1 handrail x 1 rounds  (CGA)        -step-to pattern throughout     *frequent rest breaks throughout     PATIENT EDUCATION AND HOME EXERCISES     Home Exercises Provided and Patient Education Provided     Education provided:   - standing HEP  - continuing to wear prosthesis for 1 hour at a time, taking off for one hour    Written Home Exercises Provided: Patient instructed to cont prior HEP. Exercises were reviewed and Audrey was able to demonstrate them prior to the end of the session.  Audrey demonstrated good  understanding of the education provided. See EMR under Patient Instructions for exercises provided during therapy sessions    ASSESSMENT     Ms. Natarajan tolerated session well. Further practice with backwards gait completed well with need of cues for full step-length. Patient progressions continued today with L bias stance and weight-shifts, with greater challenge with side-stepping, and with start of stair training. Patient was challenged by increased L weight-bearing, but did improve within practice. She had difficulty with side-stepping without compensatory strategies, but completed well overall. Initiation of stair training also completed well, with step-to pattern and both B and single HR use. Pain level without change throughout activities today. Patient remains appropriate for skilled physical therapy.     Audrey Is progressing well towards her goals.   Pt prognosis is Good.     Pt will continue to benefit from skilled outpatient physical therapy to address the deficits listed in the problem list box on initial evaluation, provide pt/family education and to maximize pt's level of independence in the home and community environment.     Pt's spiritual, cultural and educational needs considered and pt agreeable to plan of care and goals.     Anticipated barriers to physical therapy:  none    Goals: Short Term Goals- 4 Weeks  Pt to demonstrate independence in performance of HEP in order to improve daily level of physical activity and improve carry over session to session. Ongoing  Pt to improve B LE strength by > / = 1/2 MMT score in order to improve strength for daily activities. Ongoing  Pt to score < / = 22 seconds pm TUG in order to decrease fall risk and maximize functional strength for daily activities.Ongoing  Pt to score > / = 19 on Tinetti in order to improve safety and balance during gait.Ongoing  Pt to demo gait for > 200 ft with SPC in order to promote return to independence in home and community. Ongoing       Long Term Goals - 12 Weeks  Pt to demonstrate compliance with HEP > / = 3x per week in order to improve daily level of physical activity and improve carry over session to session.Ongoing  Pt to improve B LE strength by > / = 1 MMT score in order to improve strength for daily activities.Ongoing  Pt to demonstrate TUG score of < / = 18 seconds in order to decrease fall risk and maximize functional strength for daily activities.Ongoing  Pt to score > / = 24 on Tinetti in order to improve safety and balance during gait.Ongoing  Pt to demo gait for > 200 ft without AD in order to promote return to independence in home and community. Ongoing  Pt to demonstrate ability to balance for > / = 30 seconds on all MCTSIB conditions with no sway in order to improve vestibular response and decrease fall risk.  Ongoing    PLAN     Continue PT 1-2x per week for 12 weeks.     Continue gait training and dynamic balance.     Zoe Dumont, PT, DPT  9/12/2023

## 2023-09-13 ENCOUNTER — HOSPITAL ENCOUNTER (OUTPATIENT)
Dept: RADIOLOGY | Facility: HOSPITAL | Age: 53
Discharge: HOME | End: 2023-09-13
Attending: PODIATRIST
Payer: MEDICAID

## 2023-09-13 ENCOUNTER — OFFICE VISIT (OUTPATIENT)
Dept: PODIATRY | Facility: CLINIC | Age: 53
End: 2023-09-13
Payer: MEDICAID

## 2023-09-13 VITALS — HEIGHT: 66 IN | WEIGHT: 199.94 LBS | BODY MASS INDEX: 32.13 KG/M2

## 2023-09-13 DIAGNOSIS — Z89.512 S/P UNILATERAL BKA (BELOW KNEE AMPUTATION), LEFT: ICD-10-CM

## 2023-09-13 DIAGNOSIS — M79.671 PAIN IN RIGHT FOOT: ICD-10-CM

## 2023-09-13 DIAGNOSIS — M19.071 ARTHROSIS OF MIDFOOT, RIGHT: ICD-10-CM

## 2023-09-13 DIAGNOSIS — E11.49 TYPE II DIABETES MELLITUS WITH NEUROLOGICAL MANIFESTATIONS: Primary | ICD-10-CM

## 2023-09-13 DIAGNOSIS — M20.5X1 HALLUX LIMITUS, ACQUIRED, RIGHT: ICD-10-CM

## 2023-09-13 DIAGNOSIS — R60.0 EDEMA OF RIGHT FOOT: ICD-10-CM

## 2023-09-13 DIAGNOSIS — M20.41 HAMMER TOE OF RIGHT FOOT: ICD-10-CM

## 2023-09-13 PROCEDURE — 4010F ACE/ARB THERAPY RXD/TAKEN: CPT | Mod: CPTII,,, | Performed by: PODIATRIST

## 2023-09-13 PROCEDURE — 73630 XR FOOT COMPLETE 3 VIEW RIGHT: ICD-10-PCS | Mod: 26,RT,, | Performed by: RADIOLOGY

## 2023-09-13 PROCEDURE — 1159F PR MEDICATION LIST DOCUMENTED IN MEDICAL RECORD: ICD-10-PCS | Mod: CPTII,,, | Performed by: PODIATRIST

## 2023-09-13 PROCEDURE — 1159F MED LIST DOCD IN RCRD: CPT | Mod: CPTII,,, | Performed by: PODIATRIST

## 2023-09-13 PROCEDURE — 4010F PR ACE/ARB THEARPY RXD/TAKEN: ICD-10-PCS | Mod: CPTII,,, | Performed by: PODIATRIST

## 2023-09-13 PROCEDURE — 3051F HG A1C>EQUAL 7.0%<8.0%: CPT | Mod: CPTII,,, | Performed by: PODIATRIST

## 2023-09-13 PROCEDURE — 3051F PR MOST RECENT HEMOGLOBIN A1C LEVEL 7.0 - < 8.0%: ICD-10-PCS | Mod: CPTII,,, | Performed by: PODIATRIST

## 2023-09-13 PROCEDURE — 99999 PR PBB SHADOW E&M-EST. PATIENT-LVL V: CPT | Mod: PBBFAC,,, | Performed by: PODIATRIST

## 2023-09-13 PROCEDURE — 99214 PR OFFICE/OUTPT VISIT, EST, LEVL IV, 30-39 MIN: ICD-10-PCS | Mod: S$PBB,,, | Performed by: PODIATRIST

## 2023-09-13 PROCEDURE — 1160F RVW MEDS BY RX/DR IN RCRD: CPT | Mod: CPTII,,, | Performed by: PODIATRIST

## 2023-09-13 PROCEDURE — 73630 X-RAY EXAM OF FOOT: CPT | Mod: 26,RT,, | Performed by: RADIOLOGY

## 2023-09-13 PROCEDURE — 1160F PR REVIEW ALL MEDS BY PRESCRIBER/CLIN PHARMACIST DOCUMENTED: ICD-10-PCS | Mod: CPTII,,, | Performed by: PODIATRIST

## 2023-09-13 PROCEDURE — 3008F BODY MASS INDEX DOCD: CPT | Mod: CPTII,,, | Performed by: PODIATRIST

## 2023-09-13 PROCEDURE — 3008F PR BODY MASS INDEX (BMI) DOCUMENTED: ICD-10-PCS | Mod: CPTII,,, | Performed by: PODIATRIST

## 2023-09-13 PROCEDURE — 99215 OFFICE O/P EST HI 40 MIN: CPT | Mod: PBBFAC,PO | Performed by: PODIATRIST

## 2023-09-13 PROCEDURE — 73630 X-RAY EXAM OF FOOT: CPT | Mod: TC,FY,PO,RT

## 2023-09-13 PROCEDURE — 99999 PR PBB SHADOW E&M-EST. PATIENT-LVL V: ICD-10-PCS | Mod: PBBFAC,,, | Performed by: PODIATRIST

## 2023-09-13 PROCEDURE — 99214 OFFICE O/P EST MOD 30 MIN: CPT | Mod: S$PBB,,, | Performed by: PODIATRIST

## 2023-09-13 RX ORDER — METHYLPREDNISOLONE 4 MG/1
TABLET ORAL
Qty: 21 TABLET | Refills: 0 | Status: SHIPPED | OUTPATIENT
Start: 2023-09-13 | End: 2023-10-06 | Stop reason: CLARIF

## 2023-09-13 NOTE — PROGRESS NOTES
"[facility] Progress Note    Subjective:       Patient ID: Audrey Natarajan is a 53 y.o. female.    Chief Complaint: Diabetes Mellitus (6/15/23 fam/ med Dr. roldan) and Foot Pain    To clinic on scooter which he states inside her trailer at all times. She relates that due to significant left knee pain she has not been able to use knee scooter as intended and has been riding scooter seated. She says that she "pees in a bucket in bedroom as BR is 45 feet away and only go there when I poop". States spends all day in bed. When asked if continues to smoke she said yes and goes outside to smoke. Has almost finished abx from hospital which she was only given for 10 days. Her family and friends do all cooking and shopping for her but wound is bigger by cm length and width with only one tunnel felt at 1100 and has now doubled in size to 4 cm.     9/13/2023 Patient presents to clinic complaining of right foot pain and edema. Following initiation of use of her left leg prosthetic she began to have right midfoot and medial hindfoot pain.  She has been wearing flexible  tennis shoes.  Currently in physical therapy.       Review of Systems   Constitutional:  Positive for activity change and fatigue. Negative for appetite change and fever.   Respiratory:  Positive for cough (smoker). Negative for shortness of breath.    Cardiovascular:  Positive for leg swelling. Negative for chest pain.   Gastrointestinal:  Negative for diarrhea, nausea and vomiting.   Musculoskeletal:  Positive for arthralgias and myalgias.        Left posterior knee pain and swelling   Skin:  Positive for color change and wound.   Neurological:  Positive for numbness. Negative for weakness.        + paresthesia          Objective:     Vitals:    09/13/23 0858   Weight: 90.7 kg (199 lb 15.3 oz)   Height: 5' 6" (1.676 m)   PainSc:   4          Physical Exam  Nursing note reviewed.   Constitutional:       General: She is not in " acute distress.     Appearance: She is not toxic-appearing or diaphoretic.   Cardiovascular:      Pulses:           Dorsalis pedis pulses are 1+ on the right side.        Posterior tibial pulses are 2+ on the right side.   Pulmonary:      Effort: No respiratory distress.   Musculoskeletal:      Right lower leg: Edema present.      Right ankle: Swelling present. No lateral malleolus, medial malleolus, AITF ligament, CF ligament or posterior TF ligament tenderness. Decreased range of motion.      Right Achilles Tendon: No defects. Paniagua's test negative.      Left ankle: No lateral malleolus, medial malleolus, AITF ligament, CF ligament, posterior TF ligament or proximal fibula tenderness.      Right foot: Swelling and tenderness present.      Comments: There is equinus deformity RLE with decreased dorsiflexion at the ankle joint bilateral    Decreased first MPJ range of motion both weightbearing and nonweightbearing, no crepitus observed the first MP joint, + dorsal flag sign (right).     Patient has hammertoes of digits 2-5 RLE partially reducible without symptom today.        Left Lower Extremity: Left leg is amputated below knee.   Skin:     General: Skin is warm and dry.      Coloration: Skin is not pale.      Findings: Erythema present. No laceration.      Nails: There is no clubbing.   Neurological:      Sensory: No sensory deficit.      Motor: No tremor, atrophy or abnormal muscle tone.      Deep Tendon Reflexes: Reflexes are normal and symmetric.      Comments: Paresthesias, and hyperesthesia bilateral feet at toes with no clearly identified trigger or source.    Houston-Gilberto 5.07 monofilament is intact bilateral feet. Sharp/dull sensation is also intact Bilateral feet.   Psychiatric:         Attention and Perception: She is attentive.         Mood and Affect: Mood is not anxious. Affect is not inappropriate.         Speech: She is communicative. Speech is not slurred.         Behavior: Behavior is not  combative.           Assessment:           ICD-10-CM ICD-9-CM   1. Type II diabetes mellitus with neurological manifestations  E11.49 250.60   2. Pain in right foot  M79.671 729.5   3. Edema of right foot  R60.0 782.3   4. S/P unilateral BKA (below knee amputation), left  Z89.512 V49.75   5. Hallux limitus, acquired, right  M20.5X1 735.8   6. Hammer toe of right foot  M20.41 735.4   7. Arthrosis of midfoot, right  M19.071 715.97                  Plan:            Reviewed x-rays looks like midfoot arthritis, but with history of Charcot we will continue to surveil the foot on occasion.  Likely inflamed midfoot when began using prosthetic.    Patient education on the chronic nature of arthralgias of the feet. Discussed non-surgical treatment options, including injection, supportive shoegear, inserts.     Patient instructed on adequate icing techniques. Patient should ice the affected area at least 10 minutes when inflammed. I advised the patient that extra icing would also be beneficial to ensure adequate anti inflammatory effect. I gave written and verbal instructions on stretching exercises. Patient expressed understanding. Discussed  wearing appropriate shoe gear and avoiding flats, slippers, sandals, and going barefoot. My recommendation for OTC supports is [employer]. Rx diabetic shoes for protection and support     Thigh-high compression socks prescription. Tubigrips to RLE; patient is to elevate. When sleeping, place a pillow under lower extremity. When sitting, support the leg at level with the waist.    We also discussed cortisone injections and NSAID therapy.     Rx medrol, she is ware of likely rise in blood sugars    RTC in 6-10 weeks. if no improvement, at this time she will receive cortisone injections and referral to PT. Patient is amenable to plan.         Orders Placed This Encounter   Procedures    DIABETIC SHOES FOR HOME USE     Primary and Secondary Diagnosis are necessary for patients to  "qualify for footwear, and documentation of the above diagnoses must be substantiated in the medical record.    I certify that I am the primary physician treating this patient for diabetes mellitus.  This order for diabetic footwear is medically necessary to protect their diabetic feet from breakdown.    I certify that this patient is under my direct care in a comprehensive plan for treatment of Diabetes Mellitus.     Order Specific Question:   Height:     Answer:   5' 6" (1.676 m)     Order Specific Question:   Weight:     Answer:   90.7 kg (199 lb 15.3 oz)     Order Specific Question:   Start date:     Answer:   9/13/2023     Order Specific Question:   Length of need (1-99 months):     Answer:   99     Order Specific Question:   Prognosis:     Answer:   Fair     Order Specific Question:   Type of diabetic shoes:     Answer:   Depth Shoe ()     Order Specific Question:   Type of inserts:     Answer:   Heat Molded Inserts ()     Order Specific Question:   Insert Qty:     Answer:   3     Order Specific Question:   Primary diagnosis condition:     Answer:   Diabetes Mellitus Type II with neuro- E11.40     Order Specific Question:   Secondary Diagnosis Qualifying conditions:     Answer:   Complete or partial amputation     Order Specific Question:   Complete or partial amputation:     Answer:   Foot (S98)    COMPRESSION STOCKINGS     thigh     Order Specific Question:   Pressure amount:     Answer:   30-40 mmHg    X-Ray Foot Complete Right     Weight Bearing     Standing Status:   Future     Number of Occurrences:   1     Standing Expiration Date:   9/12/2024        No follow-ups on file.   "

## 2023-09-13 NOTE — PATIENT INSTRUCTIONS
Over the counter pain creams: Voltaren Gel, Biofreeze, Bengay, tiger balm, two old goat, lidocaine gel,  Absorbine Veterinary Liniment Gel Topical Analgesic Sore Muscle and Joint Pain Relief  Recommend lotions: eucerin, eucerin for diabetics, aquaphor, A&D ointment, gold bond for diabetics, sween, [place]'s Bees all purpose baby ointment,  urea 40 with aloe or SkinIntegra rapid crack repair (found on amazon.com)  Shoe recommendations: (try 6pm.com, zappos.com , nordstromrack.[employer], or shoes.com for discounted prices) you can visit varsity shoes in [place], DSW shoes in [place]  or za rack in the [facility] (there are also several shoe brand outlets in the [facility])  ONLY purchase stability style tennis shoes NO flex, foam, free, yoga mat style shoes  Asics (GT 4000 or gel foundations), new balance stability type shoes (such as the 940 series), saucony (stabil c3),  Shi (GTS or Beast or transcend), propet, HokaOne (tennis shoe) Júnior (tennis shoes and boots)  Sofft Brand (women) Steven&Tee (men), clarks, crocs, aerosoles, naturalizers, SAS, ecco, born, jason gurrola, rockports (dress shoes)  Vionic, burkenstocks, fitflops, propet, taos, baretraps (sandals)  HokaOne sandals, crocs, propet (house shoes)    Nail Home remedy:  Vicks Vapor rub or Emuaid to nails for easier manageability    Diabetes: Inspecting Your Feet  Diabetes increases your chances of developing foot problems. So inspect your feet every day. This helps you find small skin irritations before they become serious infections. If you have trouble seeing the bottoms of your feet, use a mirror or ask a family member or friend to help.     Pressure spots on the bottom of the foot are common areas where problems develop.   How to check your feet  Below are tips to help you look for foot problems. Try to check your feet at the same time each day, such as when you get out of bed in the morning:  Check the top of each foot. The tops of toes, back of  the heel, and outer edge of the foot can get a lot of rubbing from poor-fitting shoes.  Check the bottom of each foot. Daily wear and tear often leads to problems at pressure spots.  Check the toes and nails. Fungal infections often occur between toes. Toenail problems can also be a sign of fungal infections or lead to breaks in the skin.  Check your shoes, too. Loose objects inside a shoe can injure the foot. Use your hand to feel inside your shoes for things like tami, loose stitching, or rough areas that could irritate your skin.  Warning signs  Look for any color changes in the foot. Redness with streaks can signal a severe infection, which needs immediate medical attention. Tell your doctor right away if you have any of these problems:  Swelling, sometimes with color changes, may be a sign of poor blood flow or infection. Symptoms include tenderness and an increase in the size of your foot.  Warm or hot areas on your feet may be signs of infection. A foot that is cold may not be getting enough blood.  Sensations such as burning, tingling, or pins and needles can be signs of a problem. Also check for areas that may be numb.  Hot spots are caused by friction or pressure. Look for hot spots in areas that get a lot of rubbing. Hot spots can turn into blisters, calluses, or sores.  Cracks and sores are caused by dry or irritated skin. They are a sign that the skin is breaking down, which can lead to infection.  Toenail problems to watch for include nails growing into the skin (ingrown toenail) and causing redness or pain. Thick, yellow, or discolored nails can signal a fungal infection.  Drainage and odor can develop from untreated sores and ulcers. Call your doctor right away if you notice white or yellow drainage, bleeding, or unpleasant odor.   © 4424-1080 [employer]. [address] [place] [address], [place] [address]. All rights reserved. This information is not intended as a substitute for  professional medical care. Always follow your healthcare professional's instructions.        Step-by-Step:  Inspecting Your Feet (Diabetes)    Date Last Reviewed: 10/1/2016  © 8703-0721 [employer]. [address] [place] [address], [place] [address]. All rights reserved. This information is not intended as a substitute for professional medical care. Always follow your healthcare professional's instructions.

## 2023-09-19 ENCOUNTER — TELEPHONE (OUTPATIENT)
Dept: FAMILY MEDICINE | Facility: CLINIC | Age: 53
End: 2023-09-19
Payer: MEDICAID

## 2023-09-19 DIAGNOSIS — E11.42 TYPE 2 DIABETES MELLITUS WITH DIABETIC POLYNEUROPATHY, WITHOUT LONG-TERM CURRENT USE OF INSULIN: ICD-10-CM

## 2023-09-19 DIAGNOSIS — Z89.512 S/P BKA (BELOW KNEE AMPUTATION) UNILATERAL, LEFT: Primary | ICD-10-CM

## 2023-09-19 NOTE — TELEPHONE ENCOUNTER
----- Message from Nury Villalpando MA sent at 9/19/2023  1:54 PM CDT -----  Name of Who is Calling:SABIHA DUNNE [[id number]]                What is the request in detail: Aparna needs a RX for her to get a straight walking cane without the prongs. Please assist.                Can the clinic reply by [facility]: No                What Number to Call Back if not in [facility]: [phone]

## 2023-09-19 NOTE — TELEPHONE ENCOUNTER
Need order to be sent to [facility]  for RX for her to get a straight walking cane without the prongs. Thx

## 2023-09-25 ENCOUNTER — CLINICAL SUPPORT (OUTPATIENT)
Dept: REHABILITATION | Facility: HOSPITAL | Age: 53
End: 2023-09-25
Payer: MEDICAID

## 2023-09-25 DIAGNOSIS — Z74.09 IMPAIRED FUNCTIONAL MOBILITY, BALANCE, GAIT, AND ENDURANCE: Primary | ICD-10-CM

## 2023-09-25 PROCEDURE — 97110 THERAPEUTIC EXERCISES: CPT | Mod: PN

## 2023-09-27 ENCOUNTER — CLINICAL SUPPORT (OUTPATIENT)
Dept: REHABILITATION | Facility: HOSPITAL | Age: 53
End: 2023-09-27
Payer: MEDICAID

## 2023-09-27 DIAGNOSIS — Z74.09 IMPAIRED FUNCTIONAL MOBILITY, BALANCE, GAIT, AND ENDURANCE: Primary | ICD-10-CM

## 2023-09-27 PROCEDURE — 97110 THERAPEUTIC EXERCISES: CPT | Mod: PN

## 2023-09-27 NOTE — PROGRESS NOTES
[facility]   Physical Therapy Treatment Note     Name: Audrey Natarajan  Clinic Number: [id number]    Therapy Diagnosis:   Encounter Diagnosis   Name Primary?    Impaired functional mobility, balance, gait, and endurance Yes       Physician: Gabe Munoz MD    Visit Date: 9/27/2023    Physician Orders: PT Eval and Treat, Post surgical   Medical Diagnosis from Referral: L97.423 (ICD-10-CM) - Left midfoot ulcer, with necrosis of muscle  Evaluation Date: 7/6/2023  Authorization Period Expiration: 12/31/23  Plan of Care Expiration: 10/6/23  Visit # / Visits authorized: 15/20    Precautions: Standard, Diabetes, and Fall    Time In: 1:50 PM  Time Out: 2:30 PM  Total Billable Time: 40 minutes    SUBJECTIVE     Audrey reports that walking at home without AD intermittently is still going well.     She was compliant with home exercise program.  Response to previous treatment: No change   Functional change: None    Pain: 3/10  Location: L lateral shin    OBJECTIVE       7/14/23   Tinetti 15 (high fall risk)         Postural control:  MCTSIB:  1. Eyes Open/feet together/Firm: 30 seconds  2. Eyes Closed/feet together/Firm: 26 seconds (previous: 6 seconds)  3. Eyes Open/feet together/Foam: NT   4. Eyes Closed/feet together/Foam: NT      Gait Assessment:   - AD used: SPC/no AD  - Assistance: S  - Distance: 70 ft  - Curb: NT  - Ramp:  NT  - Stairs: NT     GAIT DEVIATIONS:  Gait component performance:   Reliance of UE on RW  Decreased step length on R LE   Decreased gait speed  Decreased stance time on L LE  * Above impairments indicate decreased gait safety and increased fall risk.          Evaluation 9/27/23   Timed Up and Go 26+24+27 =   25.6 sec  < 20 sec safe for independent transfers,     < 30 sec assist required for transfers 19.3 seconds   6 meter walk test NT NT      Timed Up and Go fall risk:   Community dwelling older adults >13.5 sec   Chronic CVA >14 sec   Geriatric with h/o falls >15  sec   Frail elderly >32.6 sec    LE amputees >19 sec   PD >7.95- 11.5 sec   Hip OA >10 sec   Vestibular >11.1 sec      Tinetti Balance Assessment  Balance:  1. Sitting Balance 1              Leans/ slides in chair= 0              Steady= 1  2. Rises from chair 1              Unable without help= 0              Able, uses arms= 1              Able without use of arms= 2  3. Attempts to rise 0              Unable without help= 0              Able, >1 attmept required-= 1              Able, 1 attempt= 2  4. Immediate standing balance (1st 5 seconds) 2              Unsteady (swaggers, moves feet, trunk sway)= 0              Steady but uses walker or other support= 1              Narrow base of support without walker or support= 2  5. Nudged 2              Begins to fall= 0              Staggers, grabs, catches self= 1              Steady= 2  6. Standing balance 2              Unsteady= 0              Steady but wide JEN or uses AD=1              Narrow JEN without AD=2  7. Eyes closed 1              Unsteady= 0              Steady= 1  8. Turning 360 degrees 1              Discontinuous steps= 0              Continuous steps= 1              Unsteady= 0              Steady= 1  9. Sitting down 1              Unsafe= 0              Uses arms or not in a smooth motion= 1              Safe, smooth motion= 2  Balance score= 11  Gait:  1. Initiation 1              hesitates or multiple attempts to start= 0              No hesitancy= 1  2. Step length 1              Step to= 0              One foot passes= 1              reciprocal pattern= 2  3. Step height 2                         Neither foot clears floor= 0              One foot clears floor= 1              Both feet clear floor= 2  4. Step symmetry 0              Not symmetrical= 0              Appears symmetrical= 1  5. Step continuity 0              Not continuous= 0              Appears continuous= 1  6. Path 2              Marked deviation= 0              Mild/moderate  deviation or uses A.D.= 1              Straight without A.D.= 2  7. Trunk 1              Marked sway or uses A.D.= 0              No sway, but flexes knees or back, spread arms out while walking= 1              No sway, no flexion, no use of UE, no use of A.D.= 2  8. Walking stance 0              Heels apart= 0              Heels almost touching while walking= 1  Gait score= 7  Total score= 18 , high fall risk     0-18= high fall risk  19-23= moderate fall risk  24-28= low fall risk    TREATMENT     Audrey received the treatments listed below:       received therapeutic exercises to develop strength and ROM for 40 minutes including:     Testing listed above.    Assessing integrity of skin at anterior L shin pre and post session.      In parallel bars:      Stepping forward and back over pool noodle 2x10   -leading with R LE   -one UE support     Mini squats in // bars with no UE support 2x10    Gait with SPC, L prosthesis and S from PT x 50 ft, x 50 ft  Gait with no AD, L prosthesis, and CGA to SBA from PT x 40ft, x 40ft    Stairs, with 4-inch steps:     -up/down steps with one handrails x 4 rounds  -step to gait (close SBA)        -step-to pattern throughout     *frequent rest breaks throughout     PATIENT EDUCATION AND HOME EXERCISES     Home Exercises Provided and Patient Education Provided     Education provided:   - standing HEP  - continuing to wear prosthesis for 1 hour at a time, taking off for one hour    Written Home Exercises Provided: Patient instructed to cont prior HEP. Exercises were reviewed and Audrey was able to demonstrate them prior to the end of the session.  Audrey demonstrated good  understanding of the education provided. See EMR under Patient Instructions for exercises provided during therapy sessions    ASSESSMENT     Ms. Natarajan tolerated session well. Pt progress note performed this session. Pt with significant improvement noted in TUG score compared to last assessment. Pt also with  improved dynamic balance noted by improvement in Tinetti score. However, pt continues to score within cut off indicating high risk for falls. Pt with improved balance on condition 2 of MCTSIB. Pt demo significant improvement in functional mobility/ gait as indicated by ability to ambulate with SPC and no AD versus RW. Pt is progressing very well with therapy at this time. Plan to continue therapy in order to address limitations in L LE strength, gait, balance, functional mobility, safety, and independence.     Audrey Is progressing well towards her goals.   Pt prognosis is Good.     Pt will continue to benefit from skilled outpatient physical therapy to address the deficits listed in the problem list box on initial evaluation, provide pt/family education and to maximize pt's level of independence in the home and community environment.     Pt's spiritual, cultural and educational needs considered and pt agreeable to plan of care and goals.     Anticipated barriers to physical therapy: none    Goals: Short Term Goals- 4 Weeks  Pt to demonstrate independence in performance of HEP in order to improve daily level of physical activity and improve carry over session to session. Ongoing  Pt to improve B LE strength by > / = 1/2 MMT score in order to improve strength for daily activities. Ongoing  Pt to score < / = 22 seconds pm TUG in order to decrease fall risk and maximize functional strength for daily activities.Ongoing  Pt to score > / = 19 on Tinetti in order to improve safety and balance during gait.Ongoing  Pt to demo gait for > 200 ft with SPC in order to promote return to independence in home and community. Ongoing       Long Term Goals - 12 Weeks  Pt to demonstrate compliance with HEP > / = 3x per week in order to improve daily level of physical activity and improve carry over session to session.Ongoing  Pt to improve B LE strength by > / = 1 MMT score in order to improve strength for daily activities.Ongoing  Pt  to demonstrate TUG score of < / = 18 seconds in order to decrease fall risk and maximize functional strength for daily activities.Ongoing  Pt to score > / = 24 on Tinetti in order to improve safety and balance during gait.Ongoing  Pt to demo gait for > 200 ft without AD in order to promote return to independence in home and community. Ongoing  Pt to demonstrate ability to balance for > / = 30 seconds on all MCTSIB conditions with no sway in order to improve vestibular response and decrease fall risk.  Ongoing    PLAN     Continue PT 1-2x per week for 12 weeks.     Continue gait training and dynamic balance.     Anupama Augustin, PT, DPT  9/27/2023

## 2023-09-28 ENCOUNTER — OFFICE VISIT (OUTPATIENT)
Dept: URGENT CARE | Facility: CLINIC | Age: 53
End: 2023-09-28
Payer: MEDICAID

## 2023-09-28 VITALS
SYSTOLIC BLOOD PRESSURE: 116 MMHG | HEIGHT: 66 IN | WEIGHT: 199.94 LBS | HEART RATE: 87 BPM | DIASTOLIC BLOOD PRESSURE: 71 MMHG | TEMPERATURE: 99 F | BODY MASS INDEX: 32.13 KG/M2 | RESPIRATION RATE: 16 BRPM | OXYGEN SATURATION: 95 %

## 2023-09-28 DIAGNOSIS — J06.9 VIRAL URI WITH COUGH: Primary | ICD-10-CM

## 2023-09-28 DIAGNOSIS — J44.9 CHRONIC OBSTRUCTIVE PULMONARY DISEASE, UNSPECIFIED COPD TYPE: ICD-10-CM

## 2023-09-28 LAB
CTP QC/QA: YES
SARS-COV-2 AG RESP QL IA.RAPID: NEGATIVE

## 2023-09-28 PROCEDURE — 99213 OFFICE O/P EST LOW 20 MIN: CPT | Mod: S$GLB,,,

## 2023-09-28 PROCEDURE — 87811 SARS CORONAVIRUS 2 ANTIGEN POCT, MANUAL READ: ICD-10-PCS | Mod: QW,S$GLB,,

## 2023-09-28 PROCEDURE — 87811 SARS-COV-2 COVID19 W/OPTIC: CPT | Mod: QW,S$GLB,,

## 2023-09-28 PROCEDURE — 99213 PR OFFICE/OUTPT VISIT, EST, LEVL III, 20-29 MIN: ICD-10-PCS | Mod: S$GLB,,,

## 2023-09-28 RX ORDER — FLUTICASONE PROPIONATE 50 MCG
2 SPRAY, SUSPENSION (ML) NASAL DAILY
Qty: 48 ML | Refills: 0 | Status: SHIPPED | OUTPATIENT
Start: 2023-09-28

## 2023-09-28 RX ORDER — PROMETHAZINE HYDROCHLORIDE AND DEXTROMETHORPHAN HYDROBROMIDE 6.25; 15 MG/5ML; MG/5ML
5 SYRUP ORAL EVERY 6 HOURS PRN
Qty: 180 ML | Refills: 0 | Status: ON HOLD | OUTPATIENT
Start: 2023-09-28 | End: 2023-10-11 | Stop reason: HOSPADM

## 2023-09-28 RX ORDER — IPRATROPIUM BROMIDE AND ALBUTEROL SULFATE 2.5; .5 MG/3ML; MG/3ML
3 SOLUTION RESPIRATORY (INHALATION) EVERY 6 HOURS PRN
Qty: 60 ML | Refills: 0 | Status: SHIPPED | OUTPATIENT
Start: 2023-09-28 | End: 2024-09-27

## 2023-09-28 RX ORDER — BENZONATATE 100 MG/1
100 CAPSULE ORAL 3 TIMES DAILY PRN
Qty: 30 CAPSULE | Refills: 0 | Status: SHIPPED | OUTPATIENT
Start: 2023-09-28 | End: 2023-10-19 | Stop reason: ALTCHOICE

## 2023-09-28 NOTE — PATIENT INSTRUCTIONS
- Rest.    - Drink plenty of fluids.  - Viral upper respiratory infections typically run their course in 10-14 days.      - You can take over-the-counter claritin, zyrtec, allegra, or xyzal as directed. These are antihistamines that can help with runny nose, nasal congestion, sneezing, and helps to dry up post-nasal drip, which usually causes sore throat and cough.              - If you do NOT have high blood pressure, you may use a decongestant form (D)  of this medication (ie. Claritin- D, zyrtec-D, allegra-D) or if you do not take the D form, you can take sudafed (pseudoephedrine) over the counter, which is a decongestant. Do NOT take two decongestant (D) medications at the same time (such as mucinex-D and claritin-D or plain sudafed and claritin D)    - If you DO have Hypertension, anxiety, or palpitations, it is safe to take Coricidin HBP for relief of sinus symptoms.     - You can use Flonase (fluticasone) nasal spray as directed for sinus congestion and postnasal drip. This is a steroid nasal spray that works locally over time to decrease the inflammation in your nose/sinuses and help with allergic symptoms. This is not an quick- relief spray like afrin, but it works well if used daily.  Discontinue if you develop nose bleed  - use nasal saline prior to Flonase.  - Use Ocean Spray Nasal Saline 1-3 puffs each nostril every 2-3 hours then blow out onto tissue. This is to irrigate the nasal passage way to clear the sinus openings. Use until sinus problem resolved.     - you can take plain Mucinex (guaifenesin) 1200 mg twice a day to help loosen mucous.      -warm salt water gargles can help with sore throat     - warm tea with honey can help with cough. Honey is a natural cough suppressant.     - Dextromethorphan (DM) is a cough suppressant over the counter (ie. mucinex DM, robitussin, delsym; dayquil/nyquil has DM as well.)        - Follow up with your PCP or specialty clinic as directed in the next 1-2 weeks  if not improved or as needed.  You can call [phone] to schedule an appointment with the appropriate provider.       - Go to the ER if you develop new or worsening symptoms.      - You must understand that you have received an Urgent Care treatment only and that you may be released before all of your medical problems are known or treated.   - You, the patient, will arrange for follow up care as instructed.   - If your condition worsens or fails to improve we recommend that you receive another evaluation at the ER immediately or contact your PCP to discuss your concerns or return here.

## 2023-09-28 NOTE — TELEPHONE ENCOUNTER
----- Message from Diamone Speed sent at 9/28/2023  1:08 PM CDT -----  Regarding: self [phone]      Type: RX Refill Request      Who Called: self     Have you contacted your pharmacy: yes       Refill or New Rx: refill       RX Name and Strength: albuterol-ipratropium (DUO-NEB) 2.5 mg-0.5 mg/3 mL / adavar /fluticasone propionate (FLONASE) 50 mcg/actuation nasal       Preferred Pharmacy with phone number:   [id number] - [place] [address] [place] [address]  Phone: [phone] Fax: [phone]           Local or Mail Order: local             Would the patient rather a call back or a response via [facility]? Call back       Best Call Back Number: [phone]

## 2023-09-28 NOTE — TELEPHONE ENCOUNTER
Refill Routing Note   Medication(s) are not appropriate for processing by [facility] for the following reason(s):      Clarification of medication (Rx) details  No active prescription written by provider    [facility] action(s):  Defer Care Due:  None identified     Medication Therapy Plan: Albuterol not ordered from PCP since (7/19/21); Defer      Appointments  past 12m or future 3m with PCP    Date Provider   Last Visit   6/15/2023 Donaldo Pena MD   Next Visit   10/19/2023 Donaldo Pena MD   ED visits in past 90 days: 0        Note composed:3:42 PM 09/28/2023

## 2023-09-28 NOTE — PROGRESS NOTES
"Subjective:      Patient ID: Audrey Natarajan is a 53 y.o. female.    Vitals:  height is 5' 6" (1.676 m) and weight is 90.7 kg (199 lb 15.3 oz). Her oral temperature is 98.8 °F (37.1 °C). Her blood pressure is 116/71 and her pulse is 87. Her respiration is 16 and oxygen saturation is 95%.     Chief Complaint: Sinus Problem    Patient is a 53-year-old female presenting with congestion, coughing, left ear pain, fatigue, body aches x5 days.  Has been taking NyQuil and DayQuil.  Denies fever, chest pain, SOB, nausea, vomiting, abdominal pain, dizziness.    Sinus Problem  This is a new problem. The current episode started in the past 7 days. The problem is unchanged. There has been no fever. Her pain is at a severity of 8/10. The pain is severe. Associated symptoms include congestion, coughing, ear pain, headaches, sinus pressure and a sore throat. Pertinent negatives include no chills, neck pain, shortness of breath or sneezing. Past treatments include nothing. The treatment provided no relief.     Constitution: Negative for chills and fever.   HENT:  Positive for ear pain, congestion, sinus pressure and sore throat.    Neck: Negative for neck pain and neck stiffness.   Cardiovascular:  Negative for chest pain.   Respiratory:  Positive for cough. Negative for shortness of breath.    Gastrointestinal:  Negative for abdominal pain, nausea, vomiting and diarrhea.   Musculoskeletal:  Positive for muscle ache.   Skin:  Negative for rash.   Allergic/Immunologic: Negative for sneezing.   Neurological:  Positive for headaches. Negative for dizziness.      Objective:     Physical Exam   Constitutional: She is oriented to person, place, and time. She appears well-developed.   HENT:   Head: Normocephalic and atraumatic.   Ears:   Right Ear: Tympanic membrane, external ear and ear canal normal.   Left Ear: Tympanic membrane, external ear and ear canal normal.   Nose: Congestion present.   Mouth/Throat: Oropharynx is clear and " moist. Mucous membranes are moist. Oropharynx is clear.   Eyes: Conjunctivae, EOM and lids are normal. Pupils are equal, round, and reactive to light.   Neck: Trachea normal and phonation normal. Neck supple.   Cardiovascular: Normal rate, regular rhythm, normal heart sounds and normal pulses.   Pulmonary/Chest: Effort normal and breath sounds normal. No respiratory distress.   Musculoskeletal: Normal range of motion.         General: Normal range of motion.   Neurological: no focal deficit. She is alert and oriented to person, place, and time.   Skin: Skin is warm, dry and intact. Capillary refill takes less than 2 seconds.   Psychiatric: Her speech is normal and behavior is normal. Judgment and thought content normal.   Nursing note and vitals reviewed.    Results for orders placed or performed in visit on 09/28/23   SARS Coronavirus 2 Antigen, POCT Manual Read   Result Value Ref Range    SARS Coronavirus 2 Antigen Negative Negative     Acceptable Yes      *Note: Due to a large number of results and/or encounters for the requested time period, some results have not been displayed. A complete set of results can be found in Results Review.     Assessment:     1. Viral URI with cough        Plan:     Viral URI with cough  -     SARS Coronavirus 2 Antigen, POCT Manual Read  -     promethazine-dextromethorphan (PROMETHAZINE-DM) 6.25-15 mg/5 mL Syrp; Take 5 mLs by mouth every 6 (six) hours as needed (cough).  Dispense: 180 mL; Refill: 0  -     benzonatate (TESSALON) 100 MG capsule; Take 1 capsule (100 mg total) by mouth 3 (three) times daily as needed for Cough.  Dispense: 30 capsule; Refill: 0            Patient Instructions   - Rest.    - Drink plenty of fluids.  - Viral upper respiratory infections typically run their course in 10-14 days.      - You can take over-the-counter claritin, zyrtec, allegra, or xyzal as directed. These are antihistamines that can help with runny nose, nasal congestion,  sneezing, and helps to dry up post-nasal drip, which usually causes sore throat and cough.              - If you do NOT have high blood pressure, you may use a decongestant form (D)  of this medication (ie. Claritin- D, zyrtec-D, allegra-D) or if you do not take the D form, you can take sudafed (pseudoephedrine) over the counter, which is a decongestant. Do NOT take two decongestant (D) medications at the same time (such as mucinex-D and claritin-D or plain sudafed and claritin D)    - If you DO have Hypertension, anxiety, or palpitations, it is safe to take Coricidin HBP for relief of sinus symptoms.     - You can use Flonase (fluticasone) nasal spray as directed for sinus congestion and postnasal drip. This is a steroid nasal spray that works locally over time to decrease the inflammation in your nose/sinuses and help with allergic symptoms. This is not an quick- relief spray like afrin, but it works well if used daily.  Discontinue if you develop nose bleed  - use nasal saline prior to Flonase.  - Use Ocean Spray Nasal Saline 1-3 puffs each nostril every 2-3 hours then blow out onto tissue. This is to irrigate the nasal passage way to clear the sinus openings. Use until sinus problem resolved.     - you can take plain Mucinex (guaifenesin) 1200 mg twice a day to help loosen mucous.      -warm salt water gargles can help with sore throat     - warm tea with honey can help with cough. Honey is a natural cough suppressant.     - Dextromethorphan (DM) is a cough suppressant over the counter (ie. mucinex DM, robitussin, delsym; dayquil/nyquil has DM as well.)        - Follow up with your PCP or specialty clinic as directed in the next 1-2 weeks if not improved or as needed.  You can call [phone] to schedule an appointment with the appropriate provider.       - Go to the ER if you develop new or worsening symptoms.      - You must understand that you have received an Urgent Care treatment only and that you may  be released before all of your medical problems are known or treated.   - You, the patient, will arrange for follow up care as instructed.   - If your condition worsens or fails to improve we recommend that you receive another evaluation at the ER immediately or contact your PCP to discuss your concerns or return here.

## 2023-09-29 NOTE — PROGRESS NOTES
[facility]   Physical Therapy Treatment Note     Name: Audrey Natarajan  Clinic Number: [id number]    Therapy Diagnosis:   Encounter Diagnosis   Name Primary?    Impaired functional mobility, balance, gait, and endurance Yes     Physician: Gabe Munoz MD    Visit Date: 9/25/2023    Physician Orders: PT Eval and Treat, Post surgical   Medical Diagnosis from Referral: L97.423 (ICD-10-CM) - Left midfoot ulcer, with necrosis of muscle  Evaluation Date: 7/6/2023  Authorization Period Expiration: 12/31/23  Plan of Care Expiration: 10/6/23  Visit # / Visits authorized: 12/20    Precautions: Standard, Diabetes, and Fall    Time In: 1:50 PM  Time Out: 2:30 PM  Total Billable Time: 40 minutes    SUBJECTIVE     Audrey reports she has worn her prosthesis about 4 hours per day since last session. Less pain overall. 2 hours at a time, 1 hour break.    She was compliant with home exercise program.  Response to previous treatment: No change   Functional change: None    Pain: 2/10  Location: L shin    OBJECTIVE     None taken today. See treatment section.    TREATMENT     Audrey received the treatments listed below:       received therapeutic exercises to develop strength and ROM for 40 minutes including:    Donning/doffing prosthesis with supervision from therapist      Assessing integrity of skin at anterior L shin pre and post session.     Nu-Step x 6 minutes     Transfers from w/c <> mat and chair<>wheelchair with S without L prosthesis donned      Gait with SPC, L prosthesis, and S from PT x 80 ft  -2 trials  -2/10 pain while walking     Gait with no AD, L prosthesis, and S from PT x 80 ft   -2 trials  -2/10 pain while walking     In parallel bars:    Stepping ant/posterior with R LE - no UE on // bars 2x10  Stepping laterally in/out with R LE - no UE on // bars 2x10  Mini Squats 2x10 - no UE support     Staggered stance bilateral leading leg trials with upper trunk twists 4# med ball x 10  ea LE in front  Taps to tall cone with R LE 2x10 - intermittent UE support    PATIENT EDUCATION AND HOME EXERCISES     Home Exercises Provided and Patient Education Provided     Education provided:   - standing HEP  - continuing to wear prosthesis for 1 hour at a time, taking off for one hour    Written Home Exercises Provided: Patient instructed to cont prior HEP. Exercises were reviewed and Audrey was able to demonstrate them prior to the end of the session.  Audrey demonstrated good  understanding of the education provided. See EMR under Patient Instructions for exercises provided during therapy sessions    ASSESSMENT     Ms. Natarajan tolerated session well. Continued walking this session with SPC in clinic, pt able to demo safe gait with no LOB. Pt also able to demo gait in clinic with no AD this session, good stability and gait mechanics noted. Less pain reported in L LE today overall compared to previous session. Pt remains appropriate for therapy at this time, progressing very well.     Audrey Is progressing well towards her goals.   Pt prognosis is Good.     Pt will continue to benefit from skilled outpatient physical therapy to address the deficits listed in the problem list box on initial evaluation, provide pt/family education and to maximize pt's level of independence in the home and community environment.     Pt's spiritual, cultural and educational needs considered and pt agreeable to plan of care and goals.     Anticipated barriers to physical therapy: none    Goals: Short Term Goals- 4 Weeks  Pt to demonstrate independence in performance of HEP in order to improve daily level of physical activity and improve carry over session to session. Ongoing  Pt to improve B LE strength by > / = 1/2 MMT score in order to improve strength for daily activities. Ongoing  Pt to score < / = 22 seconds pm TUG in order to decrease fall risk and maximize functional strength for daily activities.Ongoing  Pt to score > / =  19 on Tinetti in order to improve safety and balance during gait.Ongoing  Pt to demo gait for > 200 ft with SPC in order to promote return to independence in home and community. Ongoing       Long Term Goals - 12 Weeks  Pt to demonstrate compliance with HEP > / = 3x per week in order to improve daily level of physical activity and improve carry over session to session.Ongoing  Pt to improve B LE strength by > / = 1 MMT score in order to improve strength for daily activities.Ongoing  Pt to demonstrate TUG score of < / = 18 seconds in order to decrease fall risk and maximize functional strength for daily activities.Ongoing  Pt to score > / = 24 on Tinetti in order to improve safety and balance during gait.Ongoing  Pt to demo gait for > 200 ft without AD in order to promote return to independence in home and community. Ongoing  Pt to demonstrate ability to balance for > / = 30 seconds on all MCTSIB conditions with no sway in order to improve vestibular response and decrease fall risk.  Ongoing    PLAN     Continue PT 1-2x per week for 12 weeks.     Continue gait training and dynamic balance.     Anupama Augustin, PT, DPT  9/25/2023

## 2023-10-06 ENCOUNTER — HOSPITAL ENCOUNTER (INPATIENT)
Facility: HOSPITAL | Age: 53
LOS: 5 days | Discharge: HOME | DRG: 603 | End: 2023-10-11
Attending: EMERGENCY MEDICINE | Admitting: STUDENT IN AN ORGANIZED HEALTH CARE EDUCATION/TRAINING PROGRAM
Payer: MEDICAID

## 2023-10-06 DIAGNOSIS — L03.213 PRESEPTAL CELLULITIS: ICD-10-CM

## 2023-10-06 DIAGNOSIS — E87.20 LACTIC ACIDOSIS: ICD-10-CM

## 2023-10-06 DIAGNOSIS — R07.9 CHEST PAIN: ICD-10-CM

## 2023-10-06 DIAGNOSIS — R73.9 HYPERGLYCEMIA: ICD-10-CM

## 2023-10-06 DIAGNOSIS — J42 CHRONIC BRONCHITIS, UNSPECIFIED CHRONIC BRONCHITIS TYPE: Primary | Chronic | ICD-10-CM

## 2023-10-06 DIAGNOSIS — L02.811 ABSCESS OF SCALP: ICD-10-CM

## 2023-10-06 PROBLEM — R79.89 ELEVATED LACTIC ACID LEVEL: Status: ACTIVE | Noted: 2023-10-06

## 2023-10-06 LAB
ALBUMIN SERPL BCP-MCNC: 2.9 G/DL (ref 3.5–5.2)
ALLENS TEST: ABNORMAL
ALLENS TEST: ABNORMAL
ALP SERPL-CCNC: 85 U/L (ref 55–135)
ALT SERPL W/O P-5'-P-CCNC: 12 U/L (ref 10–44)
ANION GAP SERPL CALC-SCNC: 17 MMOL/L (ref 8–16)
AST SERPL-CCNC: 15 U/L (ref 10–40)
B-OH-BUTYR BLD STRIP-SCNC: 0.2 MMOL/L (ref 0–0.5)
BACTERIA #/AREA URNS HPF: ABNORMAL /HPF
BASOPHILS # BLD AUTO: 0.11 K/UL (ref 0–0.2)
BASOPHILS NFR BLD: 0.7 % (ref 0–1.9)
BILIRUB SERPL-MCNC: 0.1 MG/DL (ref 0.1–1)
BILIRUB UR QL STRIP: NEGATIVE
BUN SERPL-MCNC: 6 MG/DL (ref 6–20)
CALCIUM SERPL-MCNC: 8.4 MG/DL (ref 8.7–10.5)
CHLORIDE SERPL-SCNC: 97 MMOL/L (ref 95–110)
CLARITY UR: CLEAR
CO2 SERPL-SCNC: 22 MMOL/L (ref 23–29)
COLOR UR: YELLOW
CREAT SERPL-MCNC: 0.8 MG/DL (ref 0.5–1.4)
CTP QC/QA: YES
CTP QC/QA: YES
DIFFERENTIAL METHOD: ABNORMAL
EOSINOPHIL # BLD AUTO: 0.3 K/UL (ref 0–0.5)
EOSINOPHIL NFR BLD: 1.8 % (ref 0–8)
ERYTHROCYTE [DISTWIDTH] IN BLOOD BY AUTOMATED COUNT: 26 % (ref 11.5–14.5)
EST. GFR  (NO RACE VARIABLE): >60 ML/MIN/1.73 M^2
GLUCOSE SERPL-MCNC: 370 MG/DL (ref 70–110)
GLUCOSE UR QL STRIP: ABNORMAL
HCO3 UR-SCNC: 32.6 MMOL/L (ref 24–28)
HCT VFR BLD AUTO: 36.8 % (ref 37–48.5)
HGB BLD-MCNC: 10.9 G/DL (ref 12–16)
HGB UR QL STRIP: NEGATIVE
IMM GRANULOCYTES # BLD AUTO: 0.15 K/UL (ref 0–0.04)
IMM GRANULOCYTES NFR BLD AUTO: 1 % (ref 0–0.5)
KETONES UR QL STRIP: ABNORMAL
LACTATE SERPL-SCNC: 6.5 MMOL/L (ref 0.5–2.2)
LDH SERPL L TO P-CCNC: 4.51 MMOL/L (ref 0.5–2.2)
LEUKOCYTE ESTERASE UR QL STRIP: ABNORMAL
LYMPHOCYTES # BLD AUTO: 5.4 K/UL (ref 1–4.8)
LYMPHOCYTES NFR BLD: 34.2 % (ref 18–48)
MCH RBC QN AUTO: 21.9 PG (ref 27–31)
MCHC RBC AUTO-ENTMCNC: 29.6 G/DL (ref 32–36)
MCV RBC AUTO: 74 FL (ref 82–98)
MICROSCOPIC COMMENT: ABNORMAL
MONOCYTES # BLD AUTO: 1.9 K/UL (ref 0.3–1)
MONOCYTES NFR BLD: 12 % (ref 4–15)
NEUTROPHILS # BLD AUTO: 8 K/UL (ref 1.8–7.7)
NEUTROPHILS NFR BLD: 50.3 % (ref 38–73)
NITRITE UR QL STRIP: NEGATIVE
NRBC BLD-RTO: 0 /100 WBC
PCO2 BLDA: 53.1 MMHG (ref 35–45)
PH SMN: 7.4 [PH] (ref 7.35–7.45)
PH UR STRIP: 6 [PH] (ref 5–8)
PLATELET # BLD AUTO: 465 K/UL (ref 150–450)
PMV BLD AUTO: 10.4 FL (ref 9.2–12.9)
PO2 BLDA: 34 MMHG (ref 40–60)
POC BE: 6 MMOL/L
POC MOLECULAR INFLUENZA A AGN: NEGATIVE
POC MOLECULAR INFLUENZA B AGN: NEGATIVE
POC SATURATED O2: 64 % (ref 95–100)
POC TCO2: 34 MMOL/L (ref 24–29)
POCT GLUCOSE: 349 MG/DL (ref 70–110)
POCT GLUCOSE: 381 MG/DL (ref 70–110)
POCT GLUCOSE: 394 MG/DL (ref 70–110)
POTASSIUM SERPL-SCNC: 4.3 MMOL/L (ref 3.5–5.1)
PROCALCITONIN SERPL IA-MCNC: 0.09 NG/ML
PROT SERPL-MCNC: 6.5 G/DL (ref 6–8.4)
PROT UR QL STRIP: NEGATIVE
RBC # BLD AUTO: 4.97 M/UL (ref 4–5.4)
RBC #/AREA URNS HPF: 6 /HPF (ref 0–4)
SAMPLE: ABNORMAL
SAMPLE: ABNORMAL
SARS-COV-2 RDRP RESP QL NAA+PROBE: NEGATIVE
SITE: ABNORMAL
SITE: ABNORMAL
SODIUM SERPL-SCNC: 136 MMOL/L (ref 136–145)
SP GR UR STRIP: 1.02 (ref 1–1.03)
SQUAMOUS #/AREA URNS HPF: 2 /HPF
URN SPEC COLLECT METH UR: ABNORMAL
UROBILINOGEN UR STRIP-ACNC: NEGATIVE EU/DL
WBC # BLD AUTO: 15.77 K/UL (ref 3.9–12.7)
WBC #/AREA URNS HPF: 9 /HPF (ref 0–5)
YEAST URNS QL MICRO: ABNORMAL

## 2023-10-06 PROCEDURE — 82962 GLUCOSE BLOOD TEST: CPT

## 2023-10-06 PROCEDURE — 82803 BLOOD GASES ANY COMBINATION: CPT

## 2023-10-06 PROCEDURE — 82010 KETONE BODYS QUAN: CPT | Performed by: EMERGENCY MEDICINE

## 2023-10-06 PROCEDURE — 81000 URINALYSIS NONAUTO W/SCOPE: CPT | Performed by: EMERGENCY MEDICINE

## 2023-10-06 PROCEDURE — 87635 SARS-COV-2 COVID-19 AMP PRB: CPT | Performed by: EMERGENCY MEDICINE

## 2023-10-06 PROCEDURE — 96375 TX/PRO/DX INJ NEW DRUG ADDON: CPT

## 2023-10-06 PROCEDURE — 83605 ASSAY OF LACTIC ACID: CPT | Performed by: EMERGENCY MEDICINE

## 2023-10-06 PROCEDURE — 99900035 HC TECH TIME PER 15 MIN (STAT)

## 2023-10-06 PROCEDURE — 87040 BLOOD CULTURE FOR BACTERIA: CPT | Mod: 59 | Performed by: EMERGENCY MEDICINE

## 2023-10-06 PROCEDURE — 96365 THER/PROPH/DIAG IV INF INIT: CPT

## 2023-10-06 PROCEDURE — 63600175 PHARM REV CODE 636 W HCPCS

## 2023-10-06 PROCEDURE — 25000003 PHARM REV CODE 250: Performed by: STUDENT IN AN ORGANIZED HEALTH CARE EDUCATION/TRAINING PROGRAM

## 2023-10-06 PROCEDURE — 84145 PROCALCITONIN (PCT): CPT | Performed by: EMERGENCY MEDICINE

## 2023-10-06 PROCEDURE — 83605 ASSAY OF LACTIC ACID: CPT | Performed by: STUDENT IN AN ORGANIZED HEALTH CARE EDUCATION/TRAINING PROGRAM

## 2023-10-06 PROCEDURE — 36415 COLL VENOUS BLD VENIPUNCTURE: CPT | Performed by: STUDENT IN AN ORGANIZED HEALTH CARE EDUCATION/TRAINING PROGRAM

## 2023-10-06 PROCEDURE — 63600175 PHARM REV CODE 636 W HCPCS: Performed by: STUDENT IN AN ORGANIZED HEALTH CARE EDUCATION/TRAINING PROGRAM

## 2023-10-06 PROCEDURE — C9113 INJ PANTOPRAZOLE SODIUM, VIA: HCPCS | Performed by: EMERGENCY MEDICINE

## 2023-10-06 PROCEDURE — 96367 TX/PROPH/DG ADDL SEQ IV INF: CPT

## 2023-10-06 PROCEDURE — 83605 ASSAY OF LACTIC ACID: CPT

## 2023-10-06 PROCEDURE — 80053 COMPREHEN METABOLIC PANEL: CPT | Performed by: EMERGENCY MEDICINE

## 2023-10-06 PROCEDURE — 99285 EMERGENCY DEPT VISIT HI MDM: CPT | Mod: 25

## 2023-10-06 PROCEDURE — 94640 AIRWAY INHALATION TREATMENT: CPT

## 2023-10-06 PROCEDURE — 25000003 PHARM REV CODE 250: Performed by: EMERGENCY MEDICINE

## 2023-10-06 PROCEDURE — 25500020 PHARM REV CODE 255: Performed by: EMERGENCY MEDICINE

## 2023-10-06 PROCEDURE — 85025 COMPLETE CBC W/AUTO DIFF WBC: CPT | Performed by: EMERGENCY MEDICINE

## 2023-10-06 PROCEDURE — 25000242 PHARM REV CODE 250 ALT 637 W/ HCPCS: Performed by: STUDENT IN AN ORGANIZED HEALTH CARE EDUCATION/TRAINING PROGRAM

## 2023-10-06 PROCEDURE — 11000001 HC ACUTE MED/SURG PRIVATE ROOM

## 2023-10-06 PROCEDURE — 25000003 PHARM REV CODE 250

## 2023-10-06 PROCEDURE — 96366 THER/PROPH/DIAG IV INF ADDON: CPT

## 2023-10-06 PROCEDURE — 87502 INFLUENZA DNA AMP PROBE: CPT

## 2023-10-06 PROCEDURE — 63600175 PHARM REV CODE 636 W HCPCS: Performed by: EMERGENCY MEDICINE

## 2023-10-06 PROCEDURE — 85025 COMPLETE CBC W/AUTO DIFF WBC: CPT | Performed by: STUDENT IN AN ORGANIZED HEALTH CARE EDUCATION/TRAINING PROGRAM

## 2023-10-06 RX ORDER — FLUTICASONE PROPIONATE 50 MCG
2 SPRAY, SUSPENSION (ML) NASAL DAILY
Status: DISCONTINUED | OUTPATIENT
Start: 2023-10-07 | End: 2023-10-11 | Stop reason: HOSPADM

## 2023-10-06 RX ORDER — GLUCAGON 1 MG
1 KIT INJECTION
Status: DISCONTINUED | OUTPATIENT
Start: 2023-10-06 | End: 2023-10-11 | Stop reason: HOSPADM

## 2023-10-06 RX ORDER — ARFORMOTEROL TARTRATE 15 UG/2ML
15 SOLUTION RESPIRATORY (INHALATION) 2 TIMES DAILY
Status: DISCONTINUED | OUTPATIENT
Start: 2023-10-06 | End: 2023-10-11 | Stop reason: HOSPADM

## 2023-10-06 RX ORDER — PANTOPRAZOLE SODIUM 40 MG/10ML
40 INJECTION, POWDER, LYOPHILIZED, FOR SOLUTION INTRAVENOUS
Status: COMPLETED | OUTPATIENT
Start: 2023-10-06 | End: 2023-10-06

## 2023-10-06 RX ORDER — TALC
6 POWDER (GRAM) TOPICAL NIGHTLY PRN
Status: DISCONTINUED | OUTPATIENT
Start: 2023-10-06 | End: 2023-10-11 | Stop reason: HOSPADM

## 2023-10-06 RX ORDER — PRAVASTATIN SODIUM 40 MG/1
40 TABLET ORAL NIGHTLY
Status: DISCONTINUED | OUTPATIENT
Start: 2023-10-06 | End: 2023-10-11 | Stop reason: HOSPADM

## 2023-10-06 RX ORDER — LANOLIN ALCOHOL/MO/W.PET/CERES
800 CREAM (GRAM) TOPICAL
Status: DISCONTINUED | OUTPATIENT
Start: 2023-10-06 | End: 2023-10-11 | Stop reason: HOSPADM

## 2023-10-06 RX ORDER — SIMETHICONE 80 MG
1 TABLET,CHEWABLE ORAL 4 TIMES DAILY PRN
Status: DISCONTINUED | OUTPATIENT
Start: 2023-10-06 | End: 2023-10-11 | Stop reason: HOSPADM

## 2023-10-06 RX ORDER — IBUPROFEN 200 MG
16 TABLET ORAL
Status: DISCONTINUED | OUTPATIENT
Start: 2023-10-06 | End: 2023-10-11 | Stop reason: HOSPADM

## 2023-10-06 RX ORDER — PANTOPRAZOLE SODIUM 40 MG/1
40 TABLET, DELAYED RELEASE ORAL DAILY
Status: DISCONTINUED | OUTPATIENT
Start: 2023-10-07 | End: 2023-10-11 | Stop reason: HOSPADM

## 2023-10-06 RX ORDER — FLUTICASONE FUROATE AND VILANTEROL 100; 25 UG/1; UG/1
1 POWDER RESPIRATORY (INHALATION) DAILY
Status: DISCONTINUED | OUTPATIENT
Start: 2023-10-07 | End: 2023-10-06

## 2023-10-06 RX ORDER — ACETAMINOPHEN 325 MG/1
650 TABLET ORAL
Status: COMPLETED | OUTPATIENT
Start: 2023-10-06 | End: 2023-10-06

## 2023-10-06 RX ORDER — ACETAMINOPHEN 325 MG/1
650 TABLET ORAL EVERY 8 HOURS PRN
Status: DISCONTINUED | OUTPATIENT
Start: 2023-10-06 | End: 2023-10-07

## 2023-10-06 RX ORDER — TETRACAINE HYDROCHLORIDE 5 MG/ML
2 SOLUTION OPHTHALMIC
Status: COMPLETED | OUTPATIENT
Start: 2023-10-06 | End: 2023-10-06

## 2023-10-06 RX ORDER — LANOLIN ALCOHOL/MO/W.PET/CERES
1 CREAM (GRAM) TOPICAL DAILY
Status: DISCONTINUED | OUTPATIENT
Start: 2023-10-07 | End: 2023-10-08

## 2023-10-06 RX ORDER — IBUPROFEN 200 MG
24 TABLET ORAL
Status: DISCONTINUED | OUTPATIENT
Start: 2023-10-06 | End: 2023-10-11 | Stop reason: HOSPADM

## 2023-10-06 RX ORDER — METHOCARBAMOL 500 MG/1
500 TABLET, FILM COATED ORAL 3 TIMES DAILY
Status: DISCONTINUED | OUTPATIENT
Start: 2023-10-06 | End: 2023-10-11 | Stop reason: HOSPADM

## 2023-10-06 RX ORDER — SODIUM,POTASSIUM PHOSPHATES 280-250MG
2 POWDER IN PACKET (EA) ORAL
Status: DISCONTINUED | OUTPATIENT
Start: 2023-10-06 | End: 2023-10-11 | Stop reason: HOSPADM

## 2023-10-06 RX ORDER — NALOXONE HCL 0.4 MG/ML
0.02 VIAL (ML) INJECTION
Status: DISCONTINUED | OUTPATIENT
Start: 2023-10-06 | End: 2023-10-11 | Stop reason: HOSPADM

## 2023-10-06 RX ORDER — TRAMADOL HYDROCHLORIDE 50 MG/1
50 TABLET ORAL EVERY 8 HOURS PRN
Status: DISCONTINUED | OUTPATIENT
Start: 2023-10-06 | End: 2023-10-10

## 2023-10-06 RX ORDER — SODIUM CHLORIDE 0.9 % (FLUSH) 0.9 %
10 SYRINGE (ML) INJECTION EVERY 12 HOURS PRN
Status: DISCONTINUED | OUTPATIENT
Start: 2023-10-06 | End: 2023-10-11 | Stop reason: HOSPADM

## 2023-10-06 RX ORDER — BUDESONIDE 0.5 MG/2ML
0.5 INHALANT ORAL 2 TIMES DAILY
Status: DISCONTINUED | OUTPATIENT
Start: 2023-10-06 | End: 2023-10-11 | Stop reason: HOSPADM

## 2023-10-06 RX ORDER — HYDROXYZINE HYDROCHLORIDE 25 MG/1
25 TABLET, FILM COATED ORAL 3 TIMES DAILY PRN
Status: DISCONTINUED | OUTPATIENT
Start: 2023-10-06 | End: 2023-10-11 | Stop reason: HOSPADM

## 2023-10-06 RX ORDER — ACETAMINOPHEN 325 MG/1
650 TABLET ORAL EVERY 4 HOURS PRN
Status: DISCONTINUED | OUTPATIENT
Start: 2023-10-06 | End: 2023-10-07

## 2023-10-06 RX ORDER — LISINOPRIL 5 MG/1
10 TABLET ORAL DAILY
Status: DISCONTINUED | OUTPATIENT
Start: 2023-10-07 | End: 2023-10-07

## 2023-10-06 RX ORDER — PNV NO.95/FERROUS FUM/FOLIC AC 28MG-0.8MG
1000 TABLET ORAL DAILY
Status: DISCONTINUED | OUTPATIENT
Start: 2023-10-07 | End: 2023-10-11 | Stop reason: HOSPADM

## 2023-10-06 RX ORDER — INSULIN ASPART 100 [IU]/ML
0-5 INJECTION, SOLUTION INTRAVENOUS; SUBCUTANEOUS
Status: DISCONTINUED | OUTPATIENT
Start: 2023-10-06 | End: 2023-10-07 | Stop reason: SDUPTHER

## 2023-10-06 RX ORDER — PROCHLORPERAZINE EDISYLATE 5 MG/ML
5 INJECTION INTRAMUSCULAR; INTRAVENOUS EVERY 6 HOURS PRN
Status: DISCONTINUED | OUTPATIENT
Start: 2023-10-06 | End: 2023-10-11 | Stop reason: HOSPADM

## 2023-10-06 RX ORDER — NAPROXEN SODIUM 220 MG/1
81 TABLET, FILM COATED ORAL DAILY
Status: DISCONTINUED | OUTPATIENT
Start: 2023-10-07 | End: 2023-10-11 | Stop reason: HOSPADM

## 2023-10-06 RX ORDER — IBUPROFEN 200 MG
1 TABLET ORAL DAILY PRN
Status: DISCONTINUED | OUTPATIENT
Start: 2023-10-06 | End: 2023-10-11 | Stop reason: HOSPADM

## 2023-10-06 RX ORDER — ONDANSETRON 2 MG/ML
4 INJECTION INTRAMUSCULAR; INTRAVENOUS
Status: COMPLETED | OUTPATIENT
Start: 2023-10-06 | End: 2023-10-06

## 2023-10-06 RX ORDER — BUMETANIDE 1 MG/1
1 TABLET ORAL DAILY
Status: DISCONTINUED | OUTPATIENT
Start: 2023-10-07 | End: 2023-10-11 | Stop reason: HOSPADM

## 2023-10-06 RX ORDER — GUAIFENESIN/DEXTROMETHORPHAN 100-10MG/5
5 SYRUP ORAL EVERY 4 HOURS PRN
Status: DISCONTINUED | OUTPATIENT
Start: 2023-10-06 | End: 2023-10-11 | Stop reason: HOSPADM

## 2023-10-06 RX ORDER — MAG HYDROX/ALUMINUM HYD/SIMETH 200-200-20
30 SUSPENSION, ORAL (FINAL DOSE FORM) ORAL 4 TIMES DAILY PRN
Status: DISCONTINUED | OUTPATIENT
Start: 2023-10-06 | End: 2023-10-11 | Stop reason: HOSPADM

## 2023-10-06 RX ORDER — GABAPENTIN 300 MG/1
600 CAPSULE ORAL 3 TIMES DAILY
Status: DISCONTINUED | OUTPATIENT
Start: 2023-10-06 | End: 2023-10-11 | Stop reason: HOSPADM

## 2023-10-06 RX ORDER — ONDANSETRON 2 MG/ML
4 INJECTION INTRAMUSCULAR; INTRAVENOUS EVERY 8 HOURS PRN
Status: DISCONTINUED | OUTPATIENT
Start: 2023-10-06 | End: 2023-10-11 | Stop reason: HOSPADM

## 2023-10-06 RX ORDER — DIVALPROEX SODIUM 250 MG/1
500 TABLET, DELAYED RELEASE ORAL NIGHTLY
Status: DISCONTINUED | OUTPATIENT
Start: 2023-10-06 | End: 2023-10-11 | Stop reason: HOSPADM

## 2023-10-06 RX ORDER — KETOROLAC TROMETHAMINE 30 MG/ML
10 INJECTION, SOLUTION INTRAMUSCULAR; INTRAVENOUS
Status: COMPLETED | OUTPATIENT
Start: 2023-10-06 | End: 2023-10-06

## 2023-10-06 RX ORDER — METOPROLOL TARTRATE 25 MG/1
25 TABLET, FILM COATED ORAL 2 TIMES DAILY
Status: DISCONTINUED | OUTPATIENT
Start: 2023-10-06 | End: 2023-10-07

## 2023-10-06 RX ORDER — POLYETHYLENE GLYCOL 3350 17 G/17G
17 POWDER, FOR SOLUTION ORAL DAILY
Status: DISCONTINUED | OUTPATIENT
Start: 2023-10-07 | End: 2023-10-11 | Stop reason: HOSPADM

## 2023-10-06 RX ORDER — BISACODYL 10 MG
10 SUPPOSITORY, RECTAL RECTAL DAILY PRN
Status: DISCONTINUED | OUTPATIENT
Start: 2023-10-06 | End: 2023-10-11 | Stop reason: HOSPADM

## 2023-10-06 RX ORDER — RISPERIDONE 1 MG/1
3 TABLET ORAL 2 TIMES DAILY
Status: DISCONTINUED | OUTPATIENT
Start: 2023-10-06 | End: 2023-10-11 | Stop reason: HOSPADM

## 2023-10-06 RX ADMIN — PANTOPRAZOLE SODIUM 40 MG: 40 INJECTION, POWDER, FOR SOLUTION INTRAVENOUS at 04:10

## 2023-10-06 RX ADMIN — METHOCARBAMOL 500 MG: 500 TABLET ORAL at 09:10

## 2023-10-06 RX ADMIN — GUAIFENESIN AND DEXTROMETHORPHAN 5 ML: 100; 10 SYRUP ORAL at 09:10

## 2023-10-06 RX ADMIN — ACETAMINOPHEN 650 MG: 325 TABLET ORAL at 08:10

## 2023-10-06 RX ADMIN — FLUORESCEIN SODIUM 1 EACH: 1 STRIP OPHTHALMIC at 04:10

## 2023-10-06 RX ADMIN — APIXABAN 5 MG: 5 TABLET, FILM COATED ORAL at 09:10

## 2023-10-06 RX ADMIN — METOPROLOL TARTRATE 25 MG: 25 TABLET, FILM COATED ORAL at 09:10

## 2023-10-06 RX ADMIN — TETRACAINE HYDROCHLORIDE 2 DROP: 5 SOLUTION OPHTHALMIC at 04:10

## 2023-10-06 RX ADMIN — CEFTRIAXONE 2 G: 2 INJECTION, POWDER, FOR SOLUTION INTRAMUSCULAR; INTRAVENOUS at 04:10

## 2023-10-06 RX ADMIN — TRAMADOL HYDROCHLORIDE 50 MG: 50 TABLET, COATED ORAL at 09:10

## 2023-10-06 RX ADMIN — DIVALPROEX SODIUM 500 MG: 250 TABLET, DELAYED RELEASE ORAL at 09:10

## 2023-10-06 RX ADMIN — GABAPENTIN 600 MG: 300 CAPSULE ORAL at 09:10

## 2023-10-06 RX ADMIN — ARFORMOTEROL TARTRATE 15 MCG: 15 SOLUTION RESPIRATORY (INHALATION) at 10:10

## 2023-10-06 RX ADMIN — BUDESONIDE 0.5 MG: 0.5 INHALANT RESPIRATORY (INHALATION) at 10:10

## 2023-10-06 RX ADMIN — INSULIN ASPART 3 UNITS: 100 INJECTION, SOLUTION INTRAVENOUS; SUBCUTANEOUS at 09:10

## 2023-10-06 RX ADMIN — IOHEXOL 65 ML: 350 INJECTION, SOLUTION INTRAVENOUS at 06:10

## 2023-10-06 RX ADMIN — HYDROXYZINE HYDROCHLORIDE 25 MG: 25 TABLET ORAL at 10:10

## 2023-10-06 RX ADMIN — PRAVASTATIN SODIUM 40 MG: 40 TABLET ORAL at 09:10

## 2023-10-06 RX ADMIN — KETOROLAC TROMETHAMINE 10 MG: 30 INJECTION, SOLUTION INTRAMUSCULAR at 04:10

## 2023-10-06 RX ADMIN — ONDANSETRON 4 MG: 2 INJECTION INTRAMUSCULAR; INTRAVENOUS at 05:10

## 2023-10-06 RX ADMIN — MELATONIN TAB 3 MG 6 MG: 3 TAB at 09:10

## 2023-10-06 RX ADMIN — SODIUM CHLORIDE, POTASSIUM CHLORIDE, SODIUM LACTATE AND CALCIUM CHLORIDE 1000 ML: 600; 310; 30; 20 INJECTION, SOLUTION INTRAVENOUS at 05:10

## 2023-10-06 RX ADMIN — RISPERIDONE 3 MG: 1 TABLET ORAL at 09:10

## 2023-10-06 RX ADMIN — IOHEXOL 50 ML: 350 INJECTION, SOLUTION INTRAVENOUS at 06:10

## 2023-10-06 NOTE — ED PROVIDER NOTES
"Encounter Date: 10/6/2023    SCRIBE #1 NOTE: I, Kelli Nadiya, am scribing for, and in the presence of,  Karly Otto MD.       History     Chief Complaint   Patient presents with    Facial Swelling     Pt to ER with reports of left eye swelling and headache x 3 days. PT reports hx of cellulitis.       Audrey Natarajan is a 53 y.o. female, with a PMHx of COPD, asthma, DM, HTN, DVT/PE, bipolar disorder, ADHD, who presents to the ED with facial swelling, headache, since 3 days ago. Patient reports facial swelling since 3 days ago, started as small area of redness and swelling on her R sided face, initially attributed her sx to lymphadenopathy from a recent head cold. She woke up this morning with sudden onset of significant L facial swelling this morning. She also reports severe headache/pain from her posterior head, crown of head, frontal head and face, describes as "feeling like someone pulled my hair too hard" in character. She does report hx of eczema and rosacea, admits to frequent scratching including at night d/t itching, resulting in cuts. She reports hx of similar sx 7 years ago when she had cellulitis, which also initially started as a migraine headache, and sudden onset of severe facial swelling in the morning. She also reports subjective fever. She also reports multiple episodes of diarrhea yesterday, mild today. No other exacerbating or alleviating factors. Denies vision changes, chest pain, dyspnea, other associated symptoms. Patient is on albuterol, Eliquis, aspirin 81 mg, Bumex, vitamin b12, depakote, iron, flonase, atarax, gabapentin, insulin, lisinopril, claritin, metoprolol, metformin, senna, ozempic, risperdal, pravastatin medications. Patient denies EtOH, tobacco, or illicit drug use. Former smoker (ceased use in 4/2023). Patient reports a PSHx of L BKA, L foot surgeries, cholecystectomy, Bl oophorectomy, ovarian cyst removal, R green's filter, gastric sleeve, splenectomy, vein surgery, " tympanostomy tube placement, cholecystectomy, hernia repair, tonsillectomy. Patient reports drug allergies to morphine, penicillin (pt reports dyspnea and hallucinations), januvia, carbamazepine. No recent abx use, but was recently on 5 day course of steroids, finished 3 days ago. She also endorses cough syrup, albuterol inhalers with her recent head cold, which she had been evaluated at  for.     The history is provided by the patient and medical records.     Review of patient's allergies indicates:   Allergen Reactions    Morphine Other (See Comments)     Patient had a psychotic episode after taking Morphine  Agitation, hallucinations  Other Reaction(s): Other (See Comments), Other (See Comments)    Patient had a psychotic episode after taking Morphine    Patient had a psychotic episode after taking Morphine Agitation, hallucinations    Penicillins Anaphylaxis     Tolerated cephalosporins in the past    Januvia [sitagliptin] Hives    Carbamazepine Other (See Comments)     hyponatremia  Other Reaction(s): Other (See Comments)    hyponatremia     Past Medical History:   Diagnosis Date    ADHD (attention deficit hyperactivity disorder)     Arthritis     Asthma     Bipolar 1 disorder     Cataract     Cigarette smoker     COPD (chronic obstructive pulmonary disease)     Coronary artery disease     A fib    Depression     bipolar manic depresson    Diabetes mellitus     Diabetic foot ulcers     Diabetic neuropathy     DVT of lower extremity, bilateral 07/2013    bilateral LE DVT. [place] filter placed.     Encounter for blood transfusion     History of blood clots 1. Left Leg=2003; 2.Bilateral Groin=Blood Clots= 5 or 6/ 2013 & 7/2013; 3. LLL of Lung=7/2013;  4. Lt. Lower Leg=7/2013.     Pt. had 1st Blood Clot after Usfkxuorfhhe=2440, & Last=2013. [place] Filter= Rt.Lateral Neck.    HTN (hypertension) 06/06/2013    Pt states that she does not have hypertension    Hypercholesteremia     Irregular heartbeat      "Neuromuscular disorder     neuropathy feet    Obese     PE (pulmonary embolism) 2013    bilat LE DVT.     Restless leg syndrome      Past Surgical History:   Procedure Laterality Date    ABDOMINAL SURGERY      gastric sleeve    BELOW KNEE AMPUTATION OF LOWER EXTREMITY Left 2023    Procedure: AMPUTATION, BELOW KNEE;  Surgeon: Gabe Munoz MD;  Location: [facility] OR;  Service: General;  Laterality: Left;  RN PREOP 2023    BILATERAL OOPHORECTOMY Bilateral 2015    CHOLECYSTECTOMY      DEBRIDEMENT OF FOOT Bilateral 5/10/2022    Procedure: DEBRIDEMENT, FOOT;  Surgeon: Maira De Los Santos DPM;  Location: [facility] OR;  Service: Podiatry;  Laterality: Bilateral;    DEBRIDEMENT OF FOOT Left 2023    Procedure: DEBRIDEMENT, FOOT,biopsy;  Surgeon: Maira De Los Santos DPM;  Location: [facility] OR;  Service: Podiatry;  Laterality: Left;  request misonix, wound VAC, possible neoxx    Green' s filter Right 2012    Right Neck & Tunneled Down.    HERNIA REPAIR      "[place] of Hernias Repaires around th Belly Button.", pt. states    INCISION AND DRAINAGE FOOT Left 2022    Procedure: INCISION AND DRAINAGE, FOOT;  Surgeon: Fahad Razo DPM;  Location: [facility] OR;  Service: Podiatry;  Laterality: Left;    LAPAROSCOPIC CHOLECYSTECTOMY N/A 9/10/2020    Procedure: CHOLECYSTECTOMY, LAPAROSCOPIC;  Surgeon: Montrell Gutierrez MD;  Location: [facility] OR;  Service: General;  Laterality: N/A;  RN PREOP ----COVID Negative      OVARIAN CYST REMOVAL  3/13/2014    UT REMOVAL OF OVARY/TUBE(S)      SPLENECTOMY, TOTAL  2003    TONSILLECTOMY      as a child    TYMPANOSTOMY TUBE PLACEMENT      VEIN SURGERY      Lt leg     Family History   Problem Relation Age of Onset    Hypertension Father     Diabetes Father     Heart disease Father     Cataracts Father     Diabetes Paternal Grandfather     Heart disease Paternal Grandfather     No Known Problems Mother     Ovarian cancer Maternal Grandmother          from this. ? age     " No Known Problems Sister     No Known Problems Brother     No Known Problems Maternal Aunt     No Known Problems Maternal Uncle     No Known Problems Paternal Aunt     No Known Problems Paternal Uncle     No Known Problems Maternal Grandfather     Ovarian cancer Paternal Grandmother     Uterine cancer Neg Hx     Breast cancer Neg Hx     Colon cancer Neg Hx     Amblyopia Neg Hx     Blindness Neg Hx     Cancer Neg Hx     Glaucoma Neg Hx     Macular degeneration Neg Hx     Retinal detachment Neg Hx     Strabismus Neg Hx     Stroke Neg Hx     Thyroid disease Neg Hx      Social History     Tobacco Use    Smoking status: Former     Current packs/day: 0.00     Average packs/day: 0.5 packs/day for 37.0 years (18.5 ttl pk-yrs)     Types: Cigarettes     Start date: 1983     Quit date: 2020     Years since quittin.8    Smokeless tobacco: Current    Tobacco comments:     Enrolled in the "[employer] on 5/3/14 ([facility] Member ID # [id number]). Ambulatory referral to Smoking Cessation Program   Substance Use Topics    Alcohol use: No     Alcohol/week: 0.0 standard drinks of alcohol    Drug use: No     Review of Systems   Constitutional:  Negative for chills, diaphoresis and fever.   HENT:  Positive for facial swelling.    Eyes:  Negative for photophobia and visual disturbance.   Respiratory:  Negative for cough and shortness of breath.    Cardiovascular:  Negative for chest pain and leg swelling.   Gastrointestinal:  Positive for diarrhea. Negative for abdominal pain, blood in stool, constipation, nausea and vomiting.   Genitourinary:  Negative for dysuria, flank pain, frequency, hematuria and urgency.   Musculoskeletal:  Negative for neck pain and neck stiffness.   Skin:  Positive for color change and wound. Negative for rash.   Neurological:  Positive for headaches. Negative for weakness, light-headedness and numbness.   Psychiatric/Behavioral:  Negative for confusion and suicidal ideas.    All other systems  reviewed and are negative.      Physical Exam     Initial Vitals [10/06/23 1505]   BP Pulse Resp Temp SpO2   139/63 91 18 98.4 °F (36.9 °C) 97 %      MAP       --         Physical Exam    Nursing note and vitals reviewed.  Constitutional: She appears well-developed and well-nourished. She is not diaphoretic. No distress.   Body mass index is 32.12 kg/m².     HENT:   Head: Normocephalic and atraumatic.   Mouth/Throat: Oropharynx is clear and moist. No oropharyngeal exudate.   TM's clear. Periauricular and cervical lymphadenopathy.     Scab w/ surrounding erythema to frontoparietal region. See picture. Tenderness to scalp.     Eyes: Conjunctivae and EOM are normal. Pupils are equal, round, and reactive to light. Right eye exhibits no discharge. Left eye exhibits no discharge.   Edema and erythema to L eye, slight edema to R eye. No pain with extraocular motion.     VA: see chart  IOP: R:15, L-22  Pupils: PERRLA  Fluoroscein exam performed and no corneal abrasions, dendritic lesions, or ulcerations seen.       Neck: Neck supple. No JVD present.   Cervical lymphadenopathy. No meningismus.    Normal range of motion.  Cardiovascular:  Normal rate, regular rhythm, normal heart sounds and intact distal pulses.     Exam reveals no gallop and no friction rub.       No murmur heard.  Pulmonary/Chest: Breath sounds normal. No respiratory distress. She has no wheezes. She has no rhonchi. She has no rales.   Abdominal: Abdomen is soft. Bowel sounds are normal. She exhibits no distension. There is no abdominal tenderness.   Negative Oliveira's sign, no CVA tenderness There is no rebound and no guarding.   Musculoskeletal:         General: No tenderness or edema.      Cervical back: Normal range of motion and neck supple.      Comments: BKA L leg     Lymphadenopathy:     She has no cervical adenopathy.   Neurological: She is alert and oriented to person, place, and time. No cranial nerve deficit.   Skin: Skin is warm and dry.    Psychiatric: She has a normal mood and affect. Thought content normal.               ED Course   Procedures  Labs Reviewed   CBC W/ AUTO DIFFERENTIAL - Abnormal; Notable for the following components:       Result Value    WBC 15.77 (*)     Hemoglobin 10.9 (*)     Hematocrit 36.8 (*)     MCV 74 (*)     MCH 21.9 (*)     MCHC 29.6 (*)     RDW 26.0 (*)     Platelets 465 (*)     Immature Granulocytes 1.0 (*)     Gran # (ANC) 8.0 (*)     Immature Grans (Abs) 0.15 (*)     Lymph # 5.4 (*)     Mono # 1.9 (*)     All other components within normal limits   COMPREHENSIVE METABOLIC PANEL - Abnormal; Notable for the following components:    CO2 22 (*)     Glucose 370 (*)     Calcium 8.4 (*)     Albumin 2.9 (*)     Anion Gap 17 (*)     All other components within normal limits   LACTIC ACID, PLASMA - Abnormal; Notable for the following components:    Lactate (Lactic Acid) 6.5 (*)     All other components within normal limits    Narrative:     LACTIC ACID   critical result(s) called and verbal readback obtained   from GORDO CHANDLER. by LM1 10/06/2023 17:15   URINALYSIS, REFLEX TO URINE CULTURE - Abnormal; Notable for the following components:    Glucose, UA 4+ (*)     Ketones, UA Trace (*)     Leukocytes, UA 3+ (*)     All other components within normal limits    Narrative:     Specimen Source->Urine   URINALYSIS MICROSCOPIC - Abnormal; Notable for the following components:    RBC, UA 6 (*)     WBC, UA 9 (*)     All other components within normal limits    Narrative:     Specimen Source->Urine   POCT GLUCOSE - Abnormal; Notable for the following components:    POCT Glucose 349 (*)     All other components within normal limits   ISTAT PROCEDURE - Abnormal; Notable for the following components:    POC PCO2 53.1 (*)     POC PO2 34 (*)     POC HCO3 32.6 (*)     POC TCO2 34 (*)     All other components within normal limits   ISTAT LACTATE - Abnormal; Notable for the following components:    POC Lactate 4.51 (*)     All other  components within normal limits   POCT GLUCOSE - Abnormal; Notable for the following components:    POCT Glucose 381 (*)     All other components within normal limits   PROCALCITONIN   BETA - HYDROXYBUTYRATE, SERUM   SARS-COV-2 RDRP GENE   POCT INFLUENZA A/B MOLECULAR   POCT GLUCOSE, HAND-HELD DEVICE          Imaging Results              CT ORBITS WITH CONTRAST (Final result)  Result time 10/06/23 18:43:09      Final result by Shalom Bro MD (10/06/23 18:43:09)                   Impression:      No acute intracranial abnormalities identified.    Left periorbital soft tissue swelling and edema.  This may be seen with periorbital/preseptal cellulitis in the right clinical setting.  No intraorbital extension or other acute orbital abnormalities identified.      Electronically signed by: Shalom Bro MD  Date:    10/06/2023  Time:    18:43               Narrative:    EXAMINATION:  CT HEAD WITH AND WITHOUT; CT ORBITS WITH CONTRAST    CLINICAL HISTORY:  infection, facial cellultis;; Orbital cellulitis suspected;    TECHNIQUE:  Low dose axial images were obtained through the head before and after administration of 65 cc Omnipaque 350 IV contrast.  Coronal and sagittal reformations were also performed.  Separate acquisition dedicated CT of the orbits was also obtained with sagittal and coronal reformats.    COMPARISON:  CT head from April 2022.    FINDINGS:  No evidence of acute/recent major vascular distribution cerebral infarction, intraparenchymal hemorrhage, or intra-axial space occupying lesion.  No obvious abnormal postcontrast parenchymal enhancement or enhancing lesion seen.  The ventricular system is normal in size and configuration with no evidence of hydrocephalus. No effacement of the skull-base cisterns. No abnormal extra-axial fluid collections or blood products.  Bilateral maxillary sinus disease with mucous membrane thickening is seen.  There is additional mild patchy ethmoid sinus disease  visualized.  Remaining visualized paranasal sinuses and mastoid air cells are essentially clear.  The calvarium shows no significant abnormality.    There is left periorbital soft tissue swelling and edema.  Globes appear symmetric and intact.  No organized focal fluid collection or rim enhancing abscess seen.  No evidence of postseptal or intraorbital extension.  Orbits are otherwise normal in appearance with no acute abnormalities seen.  No acute facial fracture or orbital fracture seen.                                       CT Head W Wo Contrast (Final result)  Result time 10/06/23 18:43:09      Final result by Shalom Bro MD (10/06/23 18:43:09)                   Impression:      No acute intracranial abnormalities identified.    Left periorbital soft tissue swelling and edema.  This may be seen with periorbital/preseptal cellulitis in the right clinical setting.  No intraorbital extension or other acute orbital abnormalities identified.      Electronically signed by: Shalom Bro MD  Date:    10/06/2023  Time:    18:43               Narrative:    EXAMINATION:  CT HEAD WITH AND WITHOUT; CT ORBITS WITH CONTRAST    CLINICAL HISTORY:  infection, facial cellultis;; Orbital cellulitis suspected;    TECHNIQUE:  Low dose axial images were obtained through the head before and after administration of 65 cc Omnipaque 350 IV contrast.  Coronal and sagittal reformations were also performed.  Separate acquisition dedicated CT of the orbits was also obtained with sagittal and coronal reformats.    COMPARISON:  CT head from April 2022.    FINDINGS:  No evidence of acute/recent major vascular distribution cerebral infarction, intraparenchymal hemorrhage, or intra-axial space occupying lesion.  No obvious abnormal postcontrast parenchymal enhancement or enhancing lesion seen.  The ventricular system is normal in size and configuration with no evidence of hydrocephalus. No effacement of the skull-base cisterns. No  abnormal extra-axial fluid collections or blood products.  Bilateral maxillary sinus disease with mucous membrane thickening is seen.  There is additional mild patchy ethmoid sinus disease visualized.  Remaining visualized paranasal sinuses and mastoid air cells are essentially clear.  The calvarium shows no significant abnormality.    There is left periorbital soft tissue swelling and edema.  Globes appear symmetric and intact.  No organized focal fluid collection or rim enhancing abscess seen.  No evidence of postseptal or intraorbital extension.  Orbits are otherwise normal in appearance with no acute abnormalities seen.  No acute facial fracture or orbital fracture seen.                                       Medications   apixaban tablet 5 mg (5 mg Oral Given 10/6/23 2139)   aspirin chewable tablet 81 mg (has no administration in time range)   bumetanide tablet 1 mg (has no administration in time range)   divalproex EC tablet 500 mg (500 mg Oral Given 10/6/23 2140)   ferrous sulfate tablet 1 each (has no administration in time range)   fluticasone propionate 50 mcg/actuation nasal spray 100 mcg (has no administration in time range)   gabapentin capsule 600 mg (600 mg Oral Given 10/6/23 2138)   hydrOXYzine HCL tablet 25 mg (25 mg Oral Given 10/6/23 2206)   lisinopriL tablet 10 mg (has no administration in time range)   methocarbamoL tablet 500 mg (500 mg Oral Given 10/6/23 2140)   metoprolol tartrate (LOPRESSOR) tablet 25 mg (25 mg Oral Given 10/6/23 2138)   pantoprazole EC tablet 40 mg (has no administration in time range)   pravastatin tablet 40 mg (40 mg Oral Given 10/6/23 2138)   dextromethorphan-guaiFENesin  mg/5 ml liquid 5 mL (5 mLs Oral Given 10/6/23 2140)   risperiDONE tablet 3 mg (3 mg Oral Given 10/6/23 2138)   traMADoL tablet 50 mg (50 mg Oral Given 10/6/23 2139)   vitamin E capsule 1,000 Units (has no administration in time range)   sodium chloride 0.9% flush 10 mL (has no administration in  time range)   melatonin tablet 6 mg (6 mg Oral Given 10/6/23 2139)   ondansetron injection 4 mg (has no administration in time range)   prochlorperazine injection Soln 5 mg (has no administration in time range)   polyethylene glycol packet 17 g (has no administration in time range)   bisacodyL suppository 10 mg (has no administration in time range)   acetaminophen tablet 650 mg (has no administration in time range)   simethicone chewable tablet 80 mg (has no administration in time range)   aluminum-magnesium hydroxide-simethicone 200-200-20 mg/5 mL suspension 30 mL (has no administration in time range)   acetaminophen tablet 650 mg (has no administration in time range)   naloxone 0.4 mg/mL injection 0.02 mg (has no administration in time range)   potassium bicarbonate disintegrating tablet 50 mEq (has no administration in time range)   potassium bicarbonate disintegrating tablet 35 mEq (has no administration in time range)   potassium bicarbonate disintegrating tablet 60 mEq (has no administration in time range)   magnesium oxide tablet 800 mg (has no administration in time range)   magnesium oxide tablet 800 mg (has no administration in time range)   potassium, sodium phosphates 280-160-250 mg packet 2 packet (has no administration in time range)   potassium, sodium phosphates 280-160-250 mg packet 2 packet (has no administration in time range)   potassium, sodium phosphates 280-160-250 mg packet 2 packet (has no administration in time range)   glucose chewable tablet 16 g (has no administration in time range)   glucose chewable tablet 24 g (has no administration in time range)   glucagon (human recombinant) injection 1 mg (has no administration in time range)   insulin aspart U-100 pen 0-5 Units (3 Units Subcutaneous Given 10/6/23 2140)   dextrose 10% bolus 125 mL 125 mL (has no administration in time range)   dextrose 10% bolus 250 mL 250 mL (has no administration in time range)   cefTRIAXone (ROCEPHIN) 2 g in  dextrose 5 % in water (D5W) 100 mL IVPB (MB+) (has no administration in time range)   vancomycin - pharmacy to dose (has no administration in time range)   nicotine 21 mg/24 hr 1 patch (has no administration in time range)   budesonide nebulizer solution 0.5 mg (0.5 mg Nebulization Given 10/6/23 2230)   arformoteroL nebulizer solution 15 mcg (15 mcg Nebulization Given 10/6/23 2230)   vancomycin 1,500 mg in dextrose 5 % (D5W) 250 mL IVPB (Vial-Mate) (1,500 mg Intravenous Trough Due As Scheduled Before Dose 10/8/23 0500)   pneumoc 13-nasim conj-dip cr(PF) (PREVNAR 13 (PF)) 0.5 mL (has no administration in time range)   influenza (QUADRIVALENT PF) vaccine 0.5 mL (has no administration in time range)   0.9%  NaCl infusion ( Intravenous New Bag 10/7/23 0039)   ketorolac injection 9.999 mg (9.999 mg Intravenous Given 10/6/23 1657)   pantoprazole injection 40 mg (40 mg Intravenous Given 10/6/23 1654)   vancomycin (VANCOCIN) 2,250 mg in dextrose 5 % (D5W) 500 mL IVPB (0 mg Intravenous Stopped 10/6/23 2023)   cefTRIAXone (ROCEPHIN) 2 g in dextrose 5 % in water (D5W) 100 mL IVPB (MB+) (0 g Intravenous Stopped 10/6/23 1748)   fluorescein ophthalmic strip 1 each (1 each Both Eyes Given by Provider 10/6/23 1653)   TETRAcaine HCl (PF) 0.5 % Drop 2 drop (2 drops Both Eyes Given by Provider 10/6/23 1653)   lactated ringers bolus 1,000 mL (0 mLs Intravenous Stopped 10/6/23 2010)   ondansetron injection 4 mg (4 mg Intravenous Given 10/6/23 1747)   iohexoL (OMNIPAQUE 350) injection 65 mL (65 mLs Intravenous Given 10/6/23 1818)   iohexoL (OMNIPAQUE 350) injection 50 mL (50 mLs Intravenous Given 10/6/23 1819)   acetaminophen tablet 650 mg (650 mg Oral Given 10/6/23 2008)     Medical Decision Making  Amount and/or Complexity of Data Reviewed  Labs: ordered. Decision-making details documented in ED Course.  Radiology: ordered. Decision-making details documented in ED Course.    Risk  OTC drugs.  Prescription drug management.  Decision  regarding hospitalization.    MDM  Pt seen w initial presentation of local erythema, warmth, swelling to left eyelid> r eyelid. Sensitivity/pain to light touch around the erythematous area. Nontoxic appearing, VSS. No lymphangitic spread visible and no fluid pockets or fluctuant abscess noted. . No pain out of proportion, or rapid progression necrotizing fasciitis. No purulence, penetrating trauma, known MRSA colonization, IV drug use, or SIRS from MRSA infection.    +h/o poorly controlled DM. Recent steroid use.  Previous facial cellulitis requiring admission. Considered facial cellulitis, abscess, preseptal cellulitis, orbital cellulitis, meningitis.     Patient UA without significant findings for UTI and patient denies symptoms.     Covid and flu negative.     No black discharge in sinuses    WBC 15.77, near most recent CBC however patient was also admitted at that time for cellulitis. She did also have recent steroid usage.     Mild anemia to 10.9. Elevated PLts to 465.     Poorly controlled DM on CMP without DKA. AG elevated to 17 in the setting of lactic acidosis.    Patient lactic significantly elevated to 6.5, came down to 4.5 on recheck. Vanc and rocephin given. Blood cultures obtained.     Patient is on metformin, does report 1 day of diarrhea. No abdominal pain or tenderness and she states diarrhea has improved.     CT of head and orbits w/wo with no abscess, consistent with preseptal cellulitis. No orbital cellulitis noted.     Patient discussed with Dr. Mcdaniel, as lactic improving he believe she is stable for the floor. Will admit the patient for further antibiotic and monitoring. Patient updated and in agreement with plan, all questions answered.         Scribe Attestation:   Scribe #1: I performed the above scribed service and the documentation accurately describes the services I performed. I attest to the accuracy of the note.                      I, Karly Otto, personally performed the services  described in this documentation. All medical record entries made by the scribe were at my direction and in my presence. I have reviewed the chart and agree that the record reflects my personal performance and is accurate and complete.    Clinical Impression:   Final diagnoses:  [L03.213] Preseptal cellulitis  [E87.20] Lactic acidosis (Primary)  [R73.9] Hyperglycemia        ED Disposition Condition    Admit Stable                Karly Otto MD  10/07/23 0125

## 2023-10-06 NOTE — LETTER
October 11, 2023       [facility] [place] [address]  Phone: [phone]  Fax: [phone]         Audrey Natarajan  [id number]  [facility]  [place] [id number]        Audrey Natarajan    Was treated here on 10/6/2023 - 10/11/2023    May Return to Physical Therapy on 10/16/2023              Sincerely,    Dr. Mcqueen

## 2023-10-06 NOTE — Clinical Note
Diagnosis: Preseptal cellulitis [918571]   Admitting Provider:: IRA COVARRUBIAS [[id number]]   Future Attending Provider: RITA MELCHOR III [[id number]]   Reason for IP Medical Treatment  (Clinical interventions that can only be accomplished in the IP setting? ) :: preseptal cellulitis, lactic acidosis   I certify that Inpatient services for greater than or equal to 2 midnights are medically necessary:: Yes   Plans for Post-Acute care--if anticipated (pick the single best option):: A. No post acute care anticipated at this time

## 2023-10-07 LAB
ALBUMIN SERPL BCP-MCNC: 2.7 G/DL (ref 3.5–5.2)
ALP SERPL-CCNC: 82 U/L (ref 55–135)
ALT SERPL W/O P-5'-P-CCNC: 9 U/L (ref 10–44)
ANION GAP SERPL CALC-SCNC: 13 MMOL/L (ref 8–16)
AST SERPL-CCNC: 9 U/L (ref 10–40)
BASOPHILS # BLD AUTO: 0.08 K/UL (ref 0–0.2)
BASOPHILS # BLD AUTO: 0.09 K/UL (ref 0–0.2)
BASOPHILS NFR BLD: 0.5 % (ref 0–1.9)
BASOPHILS NFR BLD: 0.7 % (ref 0–1.9)
BILIRUB DIRECT SERPL-MCNC: 0.1 MG/DL (ref 0.1–0.3)
BILIRUB SERPL-MCNC: 0.2 MG/DL (ref 0.1–1)
BUN SERPL-MCNC: 11 MG/DL (ref 6–20)
CALCIUM SERPL-MCNC: 8.5 MG/DL (ref 8.7–10.5)
CHLORIDE SERPL-SCNC: 95 MMOL/L (ref 95–110)
CO2 SERPL-SCNC: 27 MMOL/L (ref 23–29)
CREAT SERPL-MCNC: 0.8 MG/DL (ref 0.5–1.4)
DIFFERENTIAL METHOD: ABNORMAL
DIFFERENTIAL METHOD: ABNORMAL
EOSINOPHIL # BLD AUTO: 0.4 K/UL (ref 0–0.5)
EOSINOPHIL # BLD AUTO: 0.5 K/UL (ref 0–0.5)
EOSINOPHIL NFR BLD: 3.3 % (ref 0–8)
EOSINOPHIL NFR BLD: 3.3 % (ref 0–8)
ERYTHROCYTE [DISTWIDTH] IN BLOOD BY AUTOMATED COUNT: 25.5 % (ref 11.5–14.5)
ERYTHROCYTE [DISTWIDTH] IN BLOOD BY AUTOMATED COUNT: 25.7 % (ref 11.5–14.5)
EST. GFR  (NO RACE VARIABLE): >60 ML/MIN/1.73 M^2
ESTIMATED AVG GLUCOSE: 286 MG/DL (ref 68–131)
GLUCOSE SERPL-MCNC: 358 MG/DL (ref 70–110)
GLUCOSE SERPL-MCNC: 474 MG/DL (ref 70–110)
HBA1C MFR BLD: 11.6 % (ref 4–5.6)
HCT VFR BLD AUTO: 32.8 % (ref 37–48.5)
HCT VFR BLD AUTO: 37.7 % (ref 37–48.5)
HGB BLD-MCNC: 10.6 G/DL (ref 12–16)
HGB BLD-MCNC: 9.9 G/DL (ref 12–16)
IMM GRANULOCYTES # BLD AUTO: 0.1 K/UL (ref 0–0.04)
IMM GRANULOCYTES # BLD AUTO: 0.12 K/UL (ref 0–0.04)
IMM GRANULOCYTES NFR BLD AUTO: 0.7 % (ref 0–0.5)
IMM GRANULOCYTES NFR BLD AUTO: 0.8 % (ref 0–0.5)
INR PPP: 1 (ref 0.8–1.2)
LACTATE SERPL-SCNC: 2.6 MMOL/L (ref 0.5–2.2)
LACTATE SERPL-SCNC: 4 MMOL/L (ref 0.5–2.2)
LYMPHOCYTES # BLD AUTO: 5.1 K/UL (ref 1–4.8)
LYMPHOCYTES # BLD AUTO: 5.4 K/UL (ref 1–4.8)
LYMPHOCYTES NFR BLD: 31.3 % (ref 18–48)
LYMPHOCYTES NFR BLD: 40.8 % (ref 18–48)
MAGNESIUM SERPL-MCNC: 1.3 MG/DL (ref 1.6–2.6)
MCH RBC QN AUTO: 21.3 PG (ref 27–31)
MCH RBC QN AUTO: 22.2 PG (ref 27–31)
MCHC RBC AUTO-ENTMCNC: 28.1 G/DL (ref 32–36)
MCHC RBC AUTO-ENTMCNC: 30.2 G/DL (ref 32–36)
MCV RBC AUTO: 74 FL (ref 82–98)
MCV RBC AUTO: 76 FL (ref 82–98)
MONOCYTES # BLD AUTO: 1.8 K/UL (ref 0.3–1)
MONOCYTES # BLD AUTO: 2.3 K/UL (ref 0.3–1)
MONOCYTES NFR BLD: 13.5 % (ref 4–15)
MONOCYTES NFR BLD: 14 % (ref 4–15)
NEUTROPHILS # BLD AUTO: 5.5 K/UL (ref 1.8–7.7)
NEUTROPHILS # BLD AUTO: 8.2 K/UL (ref 1.8–7.7)
NEUTROPHILS NFR BLD: 40.9 % (ref 38–73)
NEUTROPHILS NFR BLD: 50.2 % (ref 38–73)
NRBC BLD-RTO: 0 /100 WBC
NRBC BLD-RTO: 0 /100 WBC
PHOSPHATE SERPL-MCNC: 4.4 MG/DL (ref 2.7–4.5)
PLATELET # BLD AUTO: 417 K/UL (ref 150–450)
PLATELET # BLD AUTO: 465 K/UL (ref 150–450)
PMV BLD AUTO: 9.8 FL (ref 9.2–12.9)
PMV BLD AUTO: 9.8 FL (ref 9.2–12.9)
POCT GLUCOSE: 332 MG/DL (ref 70–110)
POCT GLUCOSE: 361 MG/DL (ref 70–110)
POCT GLUCOSE: 387 MG/DL (ref 70–110)
POCT GLUCOSE: 406 MG/DL (ref 70–110)
POCT GLUCOSE: 409 MG/DL (ref 70–110)
POCT GLUCOSE: 415 MG/DL (ref 70–110)
POCT GLUCOSE: 438 MG/DL (ref 70–110)
POTASSIUM SERPL-SCNC: 3.6 MMOL/L (ref 3.5–5.1)
PROT SERPL-MCNC: 5.9 G/DL (ref 6–8.4)
PROTHROMBIN TIME: 10.2 SEC (ref 9–12.5)
RBC # BLD AUTO: 4.46 M/UL (ref 4–5.4)
RBC # BLD AUTO: 4.98 M/UL (ref 4–5.4)
SODIUM SERPL-SCNC: 135 MMOL/L (ref 136–145)
WBC # BLD AUTO: 13.32 K/UL (ref 3.9–12.7)
WBC # BLD AUTO: 16.4 K/UL (ref 3.9–12.7)

## 2023-10-07 PROCEDURE — 27000221 HC OXYGEN, UP TO 24 HOURS

## 2023-10-07 PROCEDURE — 82947 ASSAY GLUCOSE BLOOD QUANT: CPT | Performed by: STUDENT IN AN ORGANIZED HEALTH CARE EDUCATION/TRAINING PROGRAM

## 2023-10-07 PROCEDURE — 84100 ASSAY OF PHOSPHORUS: CPT | Performed by: STUDENT IN AN ORGANIZED HEALTH CARE EDUCATION/TRAINING PROGRAM

## 2023-10-07 PROCEDURE — 94761 N-INVAS EAR/PLS OXIMETRY MLT: CPT

## 2023-10-07 PROCEDURE — 25000003 PHARM REV CODE 250: Performed by: STUDENT IN AN ORGANIZED HEALTH CARE EDUCATION/TRAINING PROGRAM

## 2023-10-07 PROCEDURE — 36415 COLL VENOUS BLD VENIPUNCTURE: CPT | Performed by: STUDENT IN AN ORGANIZED HEALTH CARE EDUCATION/TRAINING PROGRAM

## 2023-10-07 PROCEDURE — 99223 PR INITIAL HOSPITAL CARE,LEVL III: ICD-10-PCS | Mod: ,,, | Performed by: INTERNAL MEDICINE

## 2023-10-07 PROCEDURE — 83735 ASSAY OF MAGNESIUM: CPT | Performed by: STUDENT IN AN ORGANIZED HEALTH CARE EDUCATION/TRAINING PROGRAM

## 2023-10-07 PROCEDURE — 63600175 PHARM REV CODE 636 W HCPCS: Performed by: STUDENT IN AN ORGANIZED HEALTH CARE EDUCATION/TRAINING PROGRAM

## 2023-10-07 PROCEDURE — 85025 COMPLETE CBC W/AUTO DIFF WBC: CPT | Performed by: STUDENT IN AN ORGANIZED HEALTH CARE EDUCATION/TRAINING PROGRAM

## 2023-10-07 PROCEDURE — 83036 HEMOGLOBIN GLYCOSYLATED A1C: CPT | Performed by: STUDENT IN AN ORGANIZED HEALTH CARE EDUCATION/TRAINING PROGRAM

## 2023-10-07 PROCEDURE — 99223 1ST HOSP IP/OBS HIGH 75: CPT | Mod: ,,, | Performed by: INTERNAL MEDICINE

## 2023-10-07 PROCEDURE — 11000001 HC ACUTE MED/SURG PRIVATE ROOM

## 2023-10-07 PROCEDURE — 25000242 PHARM REV CODE 250 ALT 637 W/ HCPCS: Performed by: STUDENT IN AN ORGANIZED HEALTH CARE EDUCATION/TRAINING PROGRAM

## 2023-10-07 PROCEDURE — 63600175 PHARM REV CODE 636 W HCPCS: Performed by: PHYSICIAN ASSISTANT

## 2023-10-07 PROCEDURE — 80048 BASIC METABOLIC PNL TOTAL CA: CPT | Performed by: STUDENT IN AN ORGANIZED HEALTH CARE EDUCATION/TRAINING PROGRAM

## 2023-10-07 PROCEDURE — 85610 PROTHROMBIN TIME: CPT | Performed by: STUDENT IN AN ORGANIZED HEALTH CARE EDUCATION/TRAINING PROGRAM

## 2023-10-07 PROCEDURE — 87070 CULTURE OTHR SPECIMN AEROBIC: CPT | Performed by: INTERNAL MEDICINE

## 2023-10-07 PROCEDURE — 83605 ASSAY OF LACTIC ACID: CPT | Performed by: STUDENT IN AN ORGANIZED HEALTH CARE EDUCATION/TRAINING PROGRAM

## 2023-10-07 PROCEDURE — 80076 HEPATIC FUNCTION PANEL: CPT | Performed by: STUDENT IN AN ORGANIZED HEALTH CARE EDUCATION/TRAINING PROGRAM

## 2023-10-07 PROCEDURE — 94640 AIRWAY INHALATION TREATMENT: CPT

## 2023-10-07 RX ORDER — ACETAMINOPHEN 500 MG
1000 TABLET ORAL EVERY 8 HOURS
Status: DISCONTINUED | OUTPATIENT
Start: 2023-10-07 | End: 2023-10-11 | Stop reason: HOSPADM

## 2023-10-07 RX ORDER — MUPIROCIN 20 MG/G
OINTMENT TOPICAL 2 TIMES DAILY
Status: DISCONTINUED | OUTPATIENT
Start: 2023-10-07 | End: 2023-10-10

## 2023-10-07 RX ORDER — KETOROLAC TROMETHAMINE 30 MG/ML
15 INJECTION, SOLUTION INTRAMUSCULAR; INTRAVENOUS ONCE
Status: COMPLETED | OUTPATIENT
Start: 2023-10-07 | End: 2023-10-07

## 2023-10-07 RX ORDER — ACETAMINOPHEN 325 MG/1
650 TABLET ORAL EVERY 4 HOURS PRN
Status: DISCONTINUED | OUTPATIENT
Start: 2023-10-07 | End: 2023-10-07

## 2023-10-07 RX ORDER — IBUPROFEN 200 MG
16 TABLET ORAL
Status: DISCONTINUED | OUTPATIENT
Start: 2023-10-07 | End: 2023-10-07 | Stop reason: SDUPTHER

## 2023-10-07 RX ORDER — GLUCAGON 1 MG
1 KIT INJECTION
Status: DISCONTINUED | OUTPATIENT
Start: 2023-10-07 | End: 2023-10-07 | Stop reason: SDUPTHER

## 2023-10-07 RX ORDER — KETOROLAC TROMETHAMINE 30 MG/ML
30 INJECTION, SOLUTION INTRAMUSCULAR; INTRAVENOUS ONCE
Status: COMPLETED | OUTPATIENT
Start: 2023-10-07 | End: 2023-10-07

## 2023-10-07 RX ORDER — MAGNESIUM SULFATE HEPTAHYDRATE 40 MG/ML
2 INJECTION, SOLUTION INTRAVENOUS ONCE
Status: COMPLETED | OUTPATIENT
Start: 2023-10-07 | End: 2023-10-07

## 2023-10-07 RX ORDER — INSULIN ASPART 100 [IU]/ML
14 INJECTION, SOLUTION INTRAVENOUS; SUBCUTANEOUS ONCE
Status: COMPLETED | OUTPATIENT
Start: 2023-10-07 | End: 2023-10-07

## 2023-10-07 RX ORDER — SODIUM CHLORIDE 9 MG/ML
INJECTION, SOLUTION INTRAVENOUS CONTINUOUS
Status: DISCONTINUED | OUTPATIENT
Start: 2023-10-07 | End: 2023-10-08

## 2023-10-07 RX ORDER — IBUPROFEN 200 MG
24 TABLET ORAL
Status: DISCONTINUED | OUTPATIENT
Start: 2023-10-07 | End: 2023-10-07 | Stop reason: SDUPTHER

## 2023-10-07 RX ORDER — INSULIN ASPART 100 [IU]/ML
20 INJECTION, SOLUTION INTRAVENOUS; SUBCUTANEOUS ONCE
Status: COMPLETED | OUTPATIENT
Start: 2023-10-07 | End: 2023-10-07

## 2023-10-07 RX ORDER — INSULIN ASPART 100 [IU]/ML
0-10 INJECTION, SOLUTION INTRAVENOUS; SUBCUTANEOUS
Status: DISCONTINUED | OUTPATIENT
Start: 2023-10-07 | End: 2023-10-11 | Stop reason: HOSPADM

## 2023-10-07 RX ORDER — SODIUM CHLORIDE 9 MG/ML
INJECTION, SOLUTION INTRAVENOUS CONTINUOUS
Status: ACTIVE | OUTPATIENT
Start: 2023-10-07 | End: 2023-10-07

## 2023-10-07 RX ORDER — INSULIN ASPART 100 [IU]/ML
8 INJECTION, SOLUTION INTRAVENOUS; SUBCUTANEOUS
Status: DISCONTINUED | OUTPATIENT
Start: 2023-10-07 | End: 2023-10-08

## 2023-10-07 RX ADMIN — ONDANSETRON 4 MG: 2 INJECTION INTRAMUSCULAR; INTRAVENOUS at 05:10

## 2023-10-07 RX ADMIN — FERROUS SULFATE TAB 325 MG (65 MG ELEMENTAL FE) 1 EACH: 325 (65 FE) TAB at 09:10

## 2023-10-07 RX ADMIN — BUDESONIDE 0.5 MG: 0.5 INHALANT RESPIRATORY (INHALATION) at 09:10

## 2023-10-07 RX ADMIN — ASPIRIN 81 MG CHEWABLE TABLET 81 MG: 81 TABLET CHEWABLE at 09:10

## 2023-10-07 RX ADMIN — INSULIN ASPART 10 UNITS: 100 INJECTION, SOLUTION INTRAVENOUS; SUBCUTANEOUS at 11:10

## 2023-10-07 RX ADMIN — GABAPENTIN 600 MG: 300 CAPSULE ORAL at 08:10

## 2023-10-07 RX ADMIN — MUPIROCIN: 20 OINTMENT TOPICAL at 09:10

## 2023-10-07 RX ADMIN — INSULIN ASPART 10 UNITS: 100 INJECTION, SOLUTION INTRAVENOUS; SUBCUTANEOUS at 08:10

## 2023-10-07 RX ADMIN — BUDESONIDE 0.5 MG: 0.5 INHALANT RESPIRATORY (INHALATION) at 07:10

## 2023-10-07 RX ADMIN — INSULIN ASPART 8 UNITS: 100 INJECTION, SOLUTION INTRAVENOUS; SUBCUTANEOUS at 05:10

## 2023-10-07 RX ADMIN — INSULIN ASPART 20 UNITS: 100 INJECTION, SOLUTION INTRAVENOUS; SUBCUTANEOUS at 03:10

## 2023-10-07 RX ADMIN — INSULIN ASPART 14 UNITS: 100 INJECTION, SOLUTION INTRAVENOUS; SUBCUTANEOUS at 12:10

## 2023-10-07 RX ADMIN — RISPERIDONE 3 MG: 1 TABLET ORAL at 09:10

## 2023-10-07 RX ADMIN — METHOCARBAMOL 500 MG: 500 TABLET ORAL at 03:10

## 2023-10-07 RX ADMIN — BUMETANIDE 1 MG: 1 TABLET ORAL at 09:10

## 2023-10-07 RX ADMIN — HYDROXYZINE HYDROCHLORIDE 25 MG: 25 TABLET ORAL at 05:10

## 2023-10-07 RX ADMIN — PANTOPRAZOLE SODIUM 40 MG: 40 TABLET, DELAYED RELEASE ORAL at 09:10

## 2023-10-07 RX ADMIN — CEFTRIAXONE 2 G: 2 INJECTION, POWDER, FOR SOLUTION INTRAMUSCULAR; INTRAVENOUS at 03:10

## 2023-10-07 RX ADMIN — KETOROLAC TROMETHAMINE 15 MG: 30 INJECTION, SOLUTION INTRAMUSCULAR; INTRAVENOUS at 09:10

## 2023-10-07 RX ADMIN — DIVALPROEX SODIUM 500 MG: 250 TABLET, DELAYED RELEASE ORAL at 08:10

## 2023-10-07 RX ADMIN — TRAMADOL HYDROCHLORIDE 50 MG: 50 TABLET, COATED ORAL at 04:10

## 2023-10-07 RX ADMIN — POLYETHYLENE GLYCOL 3350 17 G: 17 POWDER, FOR SOLUTION ORAL at 09:10

## 2023-10-07 RX ADMIN — Medication 1000 UNITS: at 09:10

## 2023-10-07 RX ADMIN — VANCOMYCIN HYDROCHLORIDE 1500 MG: 1.5 INJECTION, POWDER, LYOPHILIZED, FOR SOLUTION INTRAVENOUS at 05:10

## 2023-10-07 RX ADMIN — METHOCARBAMOL 500 MG: 500 TABLET ORAL at 08:10

## 2023-10-07 RX ADMIN — ACETAMINOPHEN 650 MG: 325 TABLET ORAL at 05:10

## 2023-10-07 RX ADMIN — SODIUM CHLORIDE: 9 INJECTION, SOLUTION INTRAVENOUS at 12:10

## 2023-10-07 RX ADMIN — APIXABAN 5 MG: 5 TABLET, FILM COATED ORAL at 09:10

## 2023-10-07 RX ADMIN — ACETAMINOPHEN 650 MG: 325 TABLET ORAL at 10:10

## 2023-10-07 RX ADMIN — GABAPENTIN 600 MG: 300 CAPSULE ORAL at 09:10

## 2023-10-07 RX ADMIN — APIXABAN 5 MG: 5 TABLET, FILM COATED ORAL at 08:10

## 2023-10-07 RX ADMIN — MAGNESIUM SULFATE HEPTAHYDRATE 2 G: 40 INJECTION, SOLUTION INTRAVENOUS at 12:10

## 2023-10-07 RX ADMIN — KETOROLAC TROMETHAMINE 15 MG: 30 INJECTION, SOLUTION INTRAMUSCULAR; INTRAVENOUS at 05:10

## 2023-10-07 RX ADMIN — SODIUM CHLORIDE: 9 INJECTION, SOLUTION INTRAVENOUS at 08:10

## 2023-10-07 RX ADMIN — MUPIROCIN: 20 OINTMENT TOPICAL at 08:10

## 2023-10-07 RX ADMIN — GABAPENTIN 600 MG: 300 CAPSULE ORAL at 03:10

## 2023-10-07 RX ADMIN — TRAMADOL HYDROCHLORIDE 50 MG: 50 TABLET, COATED ORAL at 07:10

## 2023-10-07 RX ADMIN — INSULIN DETEMIR 18 UNITS: 100 INJECTION, SOLUTION SUBCUTANEOUS at 12:10

## 2023-10-07 RX ADMIN — ARFORMOTEROL TARTRATE 15 MCG: 15 SOLUTION RESPIRATORY (INHALATION) at 07:10

## 2023-10-07 RX ADMIN — RISPERIDONE 3 MG: 1 TABLET ORAL at 08:10

## 2023-10-07 RX ADMIN — INSULIN ASPART 4 UNITS: 100 INJECTION, SOLUTION INTRAVENOUS; SUBCUTANEOUS at 08:10

## 2023-10-07 RX ADMIN — METHOCARBAMOL 500 MG: 500 TABLET ORAL at 09:10

## 2023-10-07 RX ADMIN — FLUTICASONE PROPIONATE 100 MCG: 50 SPRAY, METERED NASAL at 09:10

## 2023-10-07 RX ADMIN — ARFORMOTEROL TARTRATE 15 MCG: 15 SOLUTION RESPIRATORY (INHALATION) at 09:10

## 2023-10-07 RX ADMIN — VANCOMYCIN HYDROCHLORIDE 1500 MG: 1.5 INJECTION, POWDER, LYOPHILIZED, FOR SOLUTION INTRAVENOUS at 08:10

## 2023-10-07 RX ADMIN — KETOROLAC TROMETHAMINE 30 MG: 30 INJECTION, SOLUTION INTRAMUSCULAR; INTRAVENOUS at 10:10

## 2023-10-07 RX ADMIN — PRAVASTATIN SODIUM 40 MG: 40 TABLET ORAL at 08:10

## 2023-10-07 RX ADMIN — ACETAMINOPHEN 1000 MG: 500 TABLET, FILM COATED ORAL at 05:10

## 2023-10-07 RX ADMIN — INSULIN DETEMIR 18 UNITS: 100 INJECTION, SOLUTION SUBCUTANEOUS at 08:10

## 2023-10-07 RX ADMIN — GUAIFENESIN AND DEXTROMETHORPHAN 5 ML: 100; 10 SYRUP ORAL at 01:10

## 2023-10-07 NOTE — ASSESSMENT & PLAN NOTE
CT head/orbit showed left periorbital soft tissue swelling and edema (periorbital/preseptal cellulitis in the right clinical setting with no intraorbital extension or other acute orbital abnormalities identified).   Doubt orbital cellulitis   Continue ceftriaxone/vancomycin   F/U cultures   ID consulted given near eye involvement

## 2023-10-07 NOTE — HOSPITAL COURSE
Patient admitted with preseptal cellulitis of the left eye.  Started on vancomycin and ceftriaxone.  Id consult id given proximity to eye, patient has ulcers, suspicious for MRSA per ID.  Continuing vanc plus ceftriaxone for now.  Patient's glucoses have been in the 400s, patient is not on insulin at home.  Unclear if this is a sudden rise in glucose due to infection, or if she has been unknowingly having these high glucoses at home which has resulted in infection.  Will start insulin.  A1c came back at 11.6 and high insulin requirement so far, will need to go home with new scripts for insulin and dm supplies.  WBC count normalized , and face looking much better.  Up to 60 units long-acting insulin and 45 units short-acting +SSI, and glucose still elevated.  Patient did finish a steroid pack a few days before admission and is likely interfered with her A1c, however patient showing no signs of not needing insulin at this point.  Culture after I&D on scalp abscess by surgery show MSSA,.already  on IV Abx.  Preseptal cellulitis and scalp abscess much improved,patient was discharged home with PO Avx and follow  up with PCP as out patient.

## 2023-10-07 NOTE — HPI
"54 yo woman admitted with preseptal cellulitis of the left eye. The patient has a remote history of "boils" and recently developed URI symptoms and swelling of her right face. She tells me that she kept touching the area (she is an admitted "") and subsequently developed "sores" on her head and left forehead. When her eye started to swell, she came to the ED and was admitted. The patient underwent CT imaging which confirmed that diagnosis. She was started on empiric bx after blood cultures were obtained and ID is consulted.  "

## 2023-10-07 NOTE — HPI
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      Patient presents with left eye swelling that was noted a day prior to presentation.  She initially had an eczematous rash on her scalp that started 4 days prior.  It was pruritic and painful like someone tugging on her hair.  She reports scratching at it and that it was progressively getting worse.  She later developed headache and right-sided glandular swelling.  On the night prior to presentation, she noted developing left eye swelling with some mild pressure.  She denied having limitation of eye movements, pain with eye movements, decreased vision or double vision. She recently finished a steroid course on 10/3.    In the ED, the patient was hemodynamically stable.  Per ED provider, she had normal intra-ocular pressures.  Labs remarkable for leukocytosis (15.7), elevated lactic acid (6.5 > 4.5), elevated anion gap (17), hyperglycemia (370).  CT head/orbit showed left periorbital soft tissue swelling and edema (periorbital/preseptal cellulitis in the right clinical setting with no intraorbital extension or other acute orbital abnormalities identified).  She was given 1 L of LR, vancomycin, ceftriaxone, Protonix 40 mg IV, Zofran 4 mg IV, and Toradol 10 mg IV.  Patient was admitted for further management.

## 2023-10-07 NOTE — NURSING
[facility] [place]  Nurses Note -- 4 Eyes      10/6/2023       Skin assessed on: Admit      [x] No Pressure Injuries Present    []Prevention Measures Documented    [] Yes LDA  for Pressure Injury Previously documented     [] Yes New Pressure Injury Discovered   [] LDA for New Pressure Injury Added      Attending RN:  Nicki Molina, RN     Second RN: Mayo Burgos, PCA

## 2023-10-07 NOTE — PLAN OF CARE
Problem: Adult Inpatient Plan of Care  Goal: Plan of Care Review  Outcome: Ongoing, Progressing  Flowsheets (Taken 10/7/2023 0853)  Plan of Care Reviewed With:   patient   spouse     Problem: Adult Inpatient Plan of Care  Goal: Absence of Hospital-Acquired Illness or Injury  Intervention: Identify and Manage Fall Risk  Flowsheets (Taken 10/7/2023 0853)  Safety Promotion/Fall Prevention:   assistive device/personal item within reach   instructed to call staff for mobility   side rails raised x 2   room near unit station  Goal: Optimal Comfort and Wellbeing  Intervention: Monitor Pain and Promote Comfort  Flowsheets (Taken 10/7/2023 0858)  Pain Management Interventions:   quiet environment facilitated   care clustered     Problem: Diabetes Comorbidity  Goal: Blood Glucose Level Within Targeted Range  Intervention: Monitor and Manage Glycemia  Flowsheets (Taken 10/7/2023 0858)  Glycemic Management:   blood glucose monitored   supplemental insulin given     Problem: Fall Injury Risk  Goal: Absence of Fall and Fall-Related Injury  Intervention: Promote Injury-Free Environment  Flowsheets (Taken 10/7/2023 0858)  Safety Promotion/Fall Prevention:   side rails raised x 2   instructed to call staff for mobility   room near unit station

## 2023-10-07 NOTE — SUBJECTIVE & OBJECTIVE
"Past Medical History:   Diagnosis Date    ADHD (attention deficit hyperactivity disorder)     Arthritis     Asthma     Bipolar 1 disorder     Cataract     Cigarette smoker     COPD (chronic obstructive pulmonary disease)     Coronary artery disease     A fib    Depression     bipolar manic depresson    Diabetes mellitus     Diabetic foot ulcers     Diabetic neuropathy     DVT of lower extremity, bilateral 07/2013    bilateral LE DVT. [place] filter placed.     Encounter for blood transfusion     History of blood clots 1. Left Leg=2003; 2.Bilateral Groin=Blood Clots= 5 or 6/ 2013 & 7/2013; 3. LLL of Lung=7/2013;  4. Lt. Lower Leg=7/2013.     Pt. had 1st Blood Clot after Blxchnbzbxcr=[id number], & Last=2013. [place] Filter= Rt.Lateral Neck.    HTN (hypertension) 06/06/2013    Pt states that she does not have hypertension    Hypercholesteremia     Irregular heartbeat     Neuromuscular disorder     neuropathy feet    Obese     PE (pulmonary embolism) 07/2013    bilat LE DVT.     Restless leg syndrome        Past Surgical History:   Procedure Laterality Date    ABDOMINAL SURGERY  2010    gastric sleeve    BELOW KNEE AMPUTATION OF LOWER EXTREMITY Left 4/19/2023    Procedure: AMPUTATION, BELOW KNEE;  Surgeon: Gabe Munoz MD;  Location: [facility] OR;  Service: General;  Laterality: Left;  RN PREOP 4/11/2023    BILATERAL OOPHORECTOMY Bilateral 1/12/2015    CHOLECYSTECTOMY      DEBRIDEMENT OF FOOT Bilateral 5/10/2022    Procedure: DEBRIDEMENT, FOOT;  Surgeon: Maira De Los Santos DPM;  Location: [facility] OR;  Service: Podiatry;  Laterality: Bilateral;    DEBRIDEMENT OF FOOT Left 2/28/2023    Procedure: DEBRIDEMENT, FOOT,biopsy;  Surgeon: Maira De Los Santos DPM;  Location: [facility] OR;  Service: Podiatry;  Laterality: Left;  request misonix, wound VAC, possible neoxx    Green' s filter Right 7/4/2012    Right Neck & Tunneled Down.    HERNIA REPAIR      "[place] of Hernias Repaires around th Belly Button.", pt. states    INCISION AND DRAINAGE " FOOT Left 12/24/2022    Procedure: INCISION AND DRAINAGE, FOOT;  Surgeon: Fahad Razo DPM;  Location: [facility] OR;  Service: Podiatry;  Laterality: Left;    LAPAROSCOPIC CHOLECYSTECTOMY N/A 9/10/2020    Procedure: CHOLECYSTECTOMY, LAPAROSCOPIC;  Surgeon: Montrell Gutierrez MD;  Location: [facility] OR;  Service: General;  Laterality: N/A;  RN PREOP 9/9----COVID Negative  9/9    OVARIAN CYST REMOVAL  3/13/2014    [place] REMOVAL OF OVARY/TUBE(S)      SPLENECTOMY, TOTAL  July 2003    TONSILLECTOMY      as a child    TYMPANOSTOMY TUBE PLACEMENT  1976    VEIN SURGERY  2003    Lt leg       Review of patient's allergies indicates:   Allergen Reactions    Morphine Other (See Comments)     Patient had a psychotic episode after taking Morphine  Agitation, hallucinations  Other Reaction(s): Other (See Comments), Other (See Comments)    Patient had a psychotic episode after taking Morphine    Patient had a psychotic episode after taking Morphine Agitation, hallucinations    Penicillins Anaphylaxis     Tolerated cephalosporins in the past    Januvia [sitagliptin] Hives    Carbamazepine Other (See Comments)     hyponatremia  Other Reaction(s): Other (See Comments)    hyponatremia       No current facility-administered medications on file prior to encounter.     Current Outpatient Medications on File Prior to Encounter   Medication Sig    albuterol (PROVENTIL/VENTOLIN HFA) 90 mcg/actuation inhaler INHALE 2 PUFFS INTO THE LUNGS EVERY 6 HOURS AS NEEDED FOR WHEEZING. RESCUE    albuterol-ipratropium (DUO-NEB) 2.5 mg-0.5 mg/3 mL nebulizer solution Take 3 mLs by nebulization every 6 (six) hours as needed for Wheezing or Shortness of Breath. Rescue    apixaban (ELIQUIS) 5 mg Tab Take 1 tablet (5 mg total) by mouth in the morning and 1 tablet (5 mg total) in the evening. Indications: Thromboembolism secondary to Atrial Fibrillation.    aspirin 81 MG Chew Take 1 tablet (81 mg total) by mouth once daily.    benzonatate (TESSALON) 100 MG capsule Take 1  capsule (100 mg total) by mouth 3 (three) times daily as needed for Cough.    bumetanide (BUMEX) 1 MG tablet Take 1 tablet (1 mg total) by mouth once daily.    divalproex (DEPAKOTE) 500 MG TbEC Take 1 tablet (500 mg total) by mouth once daily. PO QAM (Patient taking differently: Take 500 mg by mouth every evening.)    doxycycline (VIBRAMYCIN) 100 MG Cap Take 1 capsule (100 mg total) by mouth 2 (two) times daily.    ferrous sulfate 325 (65 FE) MG EC tablet Take 1 tablet (325 mg total) by mouth once daily.    fluconazole (DIFLUCAN) 150 MG Tab Take 1 tablet (150 mg total) by mouth once daily. May take second tab po two days after first if symptoms persist    fluticasone propionate (FLONASE) 50 mcg/actuation nasal spray 2 sprays (100 mcg total) by Each Nostril route once daily.    fluticasone-salmeterol diskus inhaler 250-50 mcg Inhale 1 puff into the lungs 2 (two) times daily. Controller    gabapentin (NEURONTIN) 300 MG capsule TAKE 2 CAPSULES(600 MG) BY MOUTH THREE TIMES DAILY    hydrOXYzine HCL (ATARAX) 25 MG tablet Take 1 tablet (25 mg total) by mouth every 6 (six) hours as needed for itching or anxiety.    LIDOcaine (LIDODERM) 5 % Place 1 patch onto the skin once daily. Remove & Discard patch within 12 hours or as directed by MD    lisinopriL 10 MG tablet Take 1 tablet (10 mg total) by mouth once daily.    loratadine (CLARITIN) 10 mg tablet TAKE 1 TABLET BY MOUTH DAILY    metFORMIN (GLUCOPHAGE) 1000 MG tablet TAKE 1 TABLET(1000 MG) BY MOUTH TWICE DAILY WITH MEALS    methocarbamoL (ROBAXIN) 500 MG Tab Take 1 tablet (500 mg total) by mouth 3 (three) times daily. Frequency could not be confirmed.    metoprolol tartrate (LOPRESSOR) 25 MG tablet Take 1 tablet (25 mg total) by mouth 2 (two) times daily.    multivitamin Tab Take 1 tablet by mouth once daily.    nystatin (NYSTOP) powder APPLY TO ABDOMINAL AND BREAST SKIN FOLD TWICE DAILY.    pantoprazole (PROTONIX) 40 MG tablet Take 1 tablet (40 mg total) by mouth once  daily.    pravastatin (PRAVACHOL) 40 MG tablet Take 1 tablet (40 mg total) by mouth every evening.    promethazine-dextromethorphan (PROMETHAZINE-DM) 6.25-15 mg/5 mL Syrp Take 5 mLs by mouth every 6 (six) hours as needed (cough).    risperiDONE (RISPERDAL) 3 MG Tab Take 1 tablet (3 mg total) by mouth in the morning and 1 tablet (3 mg total) in the evening. Indications: Mood.    semaglutide (OZEMPIC) 1 mg/dose (2 mg/1.5 mL) PnIj Inject 1 mg into the skin every 7 days.    traMADoL (ULTRAM) 50 mg tablet Take 1 tablet (50 mg total) by mouth every 8 (eight) hours as needed for Pain (foot pain).    vitamin E 1000 UNIT capsule Take 1,000 Units by mouth once daily.    ammonium lactate (LAC-HYDRIN) 12 % lotion APPL Y ONCE TOPICALLY TWICE DAILY FOR 30 DAYS    BIOFREEZE, MENTHOL, TOP Apply topically.    cyanocobalamin (VITAMIN B-12) 1000 MCG tablet Take 100 mcg by mouth once daily.    DUPIXENT  mg/2 mL PnIj Inject into the skin every 14 (fourteen) days.    VYVANSE 40 mg Cap Take 40 mg by mouth once daily.    [DISCONTINUED] diclofenac sodium (VOLTAREN) 1 % Gel Apply 2 g topically 4 (four) times daily as needed (Apply to painful area up to 4 times a day as needed for pain). Apply to painful area 4 times a day as needed for pain    [DISCONTINUED] divalproex (DEPAKOTE) 500 MG TbEC Take 1 tablet by mouth every morning (Patient not taking: Reported on 9/28/2023)    [DISCONTINUED] furosemide (LASIX) 20 MG tablet TAKE 1 TABLET(20 MG) BY MOUTH EVERY DAY    [DISCONTINUED] hydrOXYzine (ATARAX) 50 MG tablet Take 0.5 tablets (25 mg total) by mouth 4 (four) times daily as needed for Itching or Anxiety.    [DISCONTINUED] insulin glargine 100 units/mL SubQ pen Inject 10 Units under the skin every morning Indications: Type 2 Diabetes.    [DISCONTINUED] insulin lispro 100 unit/mL pen Inject 0-16 Units under the skin 4 (four) times a day before meals and nightly Indications: High Blood Sugar. 151-200 4 units 201-250 8 units 251-300 10  "units 301-350 12 units 351-400 16 units >400 16 units & Call Medical Provider.    [DISCONTINUED] methylPREDNISolone (MEDROL DOSEPACK) 4 mg tablet follow package directions    [DISCONTINUED] nicotine (NICODERM CQ) 14 mg/24 hr Place 1 patch onto the skin once daily. (Patient not taking: Reported on 2023)    [DISCONTINUED] ondansetron (ZOFRAN) 8 MG tablet Take 1 tablet (8 mg total) by mouth every 8 (eight) hours as needed for Nausea. (Patient not taking: Reported on 2023)    [DISCONTINUED] pen needle, diabetic (BD ERIC 2ND GEN PEN NEEDLE) 32 gauge x 5/32" Ndle Use with insulin 5 times daily    [DISCONTINUED] polyethylene glycol (GLYCOLAX) 17 gram/dose powder Mix 1 capful (17 g) with fluids and drink by mouth once daily. (Patient not taking: Reported on 2023)    [DISCONTINUED] QUEtiapine (SEROQUEL) 200 MG Tab Take 1 tablet (200 mg total) by mouth before breakfast.    [DISCONTINUED] senna-docusate 8.6-50 mg (PERICOLACE) 8.6-50 mg per tablet Take 1 tablet by mouth once daily. (Patient not taking: Reported on 2023)     Family History       Problem Relation (Age of Onset)    Cataracts Father    Diabetes Father, Paternal Grandfather    Heart disease Father, Paternal Grandfather    Hypertension Father    No Known Problems Mother, Sister, Brother, Maternal Aunt, Maternal Uncle, Paternal Aunt, Paternal Uncle, Maternal Grandfather    Ovarian cancer Maternal Grandmother, Paternal Grandmother          Tobacco Use    Smoking status: Former     Current packs/day: 0.00     Average packs/day: 0.5 packs/day for 37.0 years (18.5 ttl pk-yrs)     Types: Cigarettes     Start date: 1983     Quit date: 2020     Years since quittin.8    Smokeless tobacco: Current    Tobacco comments:     Enrolled in the [employer] on 5/3/14 ([facility] Member ID # [id number]). Ambulatory referral to Smoking Cessation Program   Substance and Sexual Activity    Alcohol use: No     Alcohol/week: 0.0 standard drinks of alcohol    " Drug use: No    Sexual activity: Yes     Partners: Male     Review of Systems   Constitutional: Negative.    HENT: Negative.     Eyes: Negative.    Respiratory: Negative.     Cardiovascular: Negative.    Gastrointestinal: Negative.    Endocrine: Negative.    Genitourinary: Negative.    Musculoskeletal: Negative.    Skin:  Positive for rash.   Allergic/Immunologic: Negative.    Neurological: Negative.         Eye swelling   Psychiatric/Behavioral: Negative.       Objective:     Vital Signs (Most Recent):  Temp: 98.5 °F (36.9 °C) (10/06/23 1803)  Pulse: 84 (10/06/23 2034)  Resp: 18 (10/06/23 1803)  BP: (!) 147/67 (10/06/23 2034)  SpO2: 95 % (10/06/23 2034) Vital Signs (24h Range):  Temp:  [98.4 °F (36.9 °C)-98.5 °F (36.9 °C)] 98.5 °F (36.9 °C)  Pulse:  [75-91] 84  Resp:  [16-18] 18  SpO2:  [95 %-97 %] 95 %  BP: (134-147)/(62-67) 147/67     Weight: 90.3 kg (199 lb)  Body mass index is 32.12 kg/m².     Physical Exam  Vitals and nursing note reviewed.   Constitutional:       General: She is not in acute distress.     Appearance: Normal appearance. She is not ill-appearing.   HENT:      Head: Normocephalic and atraumatic.      Nose: Nose normal.      Mouth/Throat:      Mouth: Mucous membranes are moist.   Eyes:      Extraocular Movements: Extraocular movements intact.   Cardiovascular:      Rate and Rhythm: Normal rate.      Pulses: Normal pulses.      Heart sounds: No murmur heard.  Pulmonary:      Effort: Pulmonary effort is normal. No respiratory distress.   Abdominal:      General: Abdomen is flat.      Palpations: Abdomen is soft.      Tenderness: There is no abdominal tenderness.   Musculoskeletal:      Right lower leg: No edema.      Left lower leg: No edema.      Comments: Left BKA   Skin:     General: Skin is warm.      Capillary Refill: Capillary refill takes less than 2 seconds.      Findings: Rash (On scalp) present.   Neurological:      General: No focal deficit present.      Mental Status: She is alert.       Comments: Periorbital swelling and erythema. No limitation of eye movements. No pain with eye movements.    Psychiatric:         Mood and Affect: Mood normal.                        Significant Labs: All pertinent labs within the past 24 hours have been reviewed.    Significant Imaging: I have reviewed all pertinent imaging results/findings within the past 24 hours.

## 2023-10-07 NOTE — H&P
[facility] Medicine  History & Physical    Patient Name: Audrey Natarajan  MRN: [id number]  Patient Class: IP- Inpatient  Admission Date: 10/6/2023  Attending Physician: Tu Avendaño III, MD   Primary Care Provider: Donaldo Pena MD         Patient information was obtained from patient and ER records.     Subjective:     Principal Problem:Preseptal cellulitis    Chief Complaint:   Chief Complaint   Patient presents with    Facial Swelling     Pt to ER with reports of left eye swelling and headache x 3 days. PT reports hx of cellulitis.          HPI: This is a 53-year-old female with a past medical history of COPD, type 2 diabetes, hypertension, hyperlipidemia, PAD, bipolar disorder, VTE (on Eliquis), tobacco use, s/p left BKA who presents with eye swelling.      Patient presents with left eye swelling that was noted a day prior to presentation.  She initially had an eczematous rash on her scalp that started 4 days prior.  It was pruritic and painful like someone tugging on her hair.  She reports scratching at it and that it was progressively getting worse.  She later developed headache and right-sided glandular swelling.  On the night prior to presentation, she noted developing left eye swelling with some mild pressure.  She denied having limitation of eye movements, pain with eye movements, decreased vision or double vision. She recently finished a steroid course on 10/3.    In the ED, the patient was hemodynamically stable.  Per ED provider, she had normal intra-ocular pressures.  Labs remarkable for leukocytosis (15.7), elevated lactic acid (6.5 > 4.5), elevated anion gap (17), hyperglycemia (370).  CT head/orbit showed left periorbital soft tissue swelling and edema (periorbital/preseptal cellulitis in the right clinical setting with no intraorbital extension or other acute orbital abnormalities identified).  She was given 1 L of LR, vancomycin, ceftriaxone, Protonix 40 mg  IV, Zofran 4 mg IV, and Toradol 10 mg IV.  Patient was admitted for further management.      Past Medical History:   Diagnosis Date    ADHD (attention deficit hyperactivity disorder)     Arthritis     Asthma     Bipolar 1 disorder     Cataract     Cigarette smoker     COPD (chronic obstructive pulmonary disease)     Coronary artery disease     A fib    Depression     bipolar manic depresson    Diabetes mellitus     Diabetic foot ulcers     Diabetic neuropathy     DVT of lower extremity, bilateral 07/2013    bilateral LE DVT. [place] filter placed.     Encounter for blood transfusion     History of blood clots 1. Left Leg=2003; 2.Bilateral Groin=Blood Clots= 5 or 6/ 2013 & 7/2013; 3. LLL of Lung=7/2013;  4. Lt. Lower Leg=7/2013.     Pt. had 1st Blood Clot after Rhmhfhmlypga=4366, & Last=2013. [place] Filter= Rt.Lateral Neck.    HTN (hypertension) 06/06/2013    Pt states that she does not have hypertension    Hypercholesteremia     Irregular heartbeat     Neuromuscular disorder     neuropathy feet    Obese     PE (pulmonary embolism) 07/2013    bilat LE DVT.     Restless leg syndrome        Past Surgical History:   Procedure Laterality Date    ABDOMINAL SURGERY  2010    gastric sleeve    BELOW KNEE AMPUTATION OF LOWER EXTREMITY Left 4/19/2023    Procedure: AMPUTATION, BELOW KNEE;  Surgeon: Gabe Munoz MD;  Location: [facility] OR;  Service: General;  Laterality: Left;  RN PREOP 4/11/2023    BILATERAL OOPHORECTOMY Bilateral 1/12/2015    CHOLECYSTECTOMY      DEBRIDEMENT OF FOOT Bilateral 5/10/2022    Procedure: DEBRIDEMENT, FOOT;  Surgeon: Maira De Los Santos DPM;  Location: [facility] OR;  Service: Podiatry;  Laterality: Bilateral;    DEBRIDEMENT OF FOOT Left 2/28/2023    Procedure: DEBRIDEMENT, FOOT,biopsy;  Surgeon: Maira De Los Santos DPM;  Location: [facility] OR;  Service: Podiatry;  Laterality: Left;  request misonix, wound VAC, possible neoxx    Green' s filter Right 7/4/2012    Right Neck &  "Tunneled Down.    HERNIA REPAIR      "[place] of Hernias Repaires around th [place].", pt. states    INCISION AND DRAINAGE FOOT Left 12/24/2022    Procedure: INCISION AND DRAINAGE, FOOT;  Surgeon: Fahad Razo DPM;  Location: [facility] OR;  Service: Podiatry;  Laterality: Left;    LAPAROSCOPIC CHOLECYSTECTOMY N/A 9/10/2020    Procedure: CHOLECYSTECTOMY, LAPAROSCOPIC;  Surgeon: Montrell Gutierrez MD;  Location: [facility] OR;  Service: General;  Laterality: N/A;  RN PREOP 9/9----COVID Negative  9/9    OVARIAN CYST REMOVAL  3/13/2014    TN REMOVAL OF OVARY/TUBE(S)      SPLENECTOMY, TOTAL  July 2003    TONSILLECTOMY      as a child    TYMPANOSTOMY TUBE PLACEMENT  1976    VEIN SURGERY  2003    Lt leg       Review of patient's allergies indicates:   Allergen Reactions    Morphine Other (See Comments)     Patient had a psychotic episode after taking Morphine  Agitation, hallucinations  Other Reaction(s): Other (See Comments), Other (See Comments)    Patient had a psychotic episode after taking Morphine    Patient had a psychotic episode after taking Morphine Agitation, hallucinations    Penicillins Anaphylaxis     Tolerated cephalosporins in the past    Januvia [sitagliptin] Hives    Carbamazepine Other (See Comments)     hyponatremia  Other Reaction(s): Other (See Comments)    hyponatremia       No current facility-administered medications on file prior to encounter.     Current Outpatient Medications on File Prior to Encounter   Medication Sig    albuterol (PROVENTIL/VENTOLIN HFA) 90 mcg/actuation inhaler INHALE 2 PUFFS INTO THE LUNGS EVERY 6 HOURS AS NEEDED FOR WHEEZING. RESCUE    albuterol-ipratropium (DUO-NEB) 2.5 mg-0.5 mg/3 mL nebulizer solution Take 3 mLs by nebulization every 6 (six) hours as needed for Wheezing or Shortness of Breath. Rescue    apixaban (ELIQUIS) 5 mg Tab Take 1 tablet (5 mg total) by mouth in the morning and 1 tablet (5 mg total) in the evening. Indications: Thromboembolism secondary to " Atrial Fibrillation.    aspirin 81 MG Chew Take 1 tablet (81 mg total) by mouth once daily.    benzonatate (TESSALON) 100 MG capsule Take 1 capsule (100 mg total) by mouth 3 (three) times daily as needed for Cough.    bumetanide (BUMEX) 1 MG tablet Take 1 tablet (1 mg total) by mouth once daily.    divalproex (DEPAKOTE) 500 MG TbEC Take 1 tablet (500 mg total) by mouth once daily. PO QAM (Patient taking differently: Take 500 mg by mouth every evening.)    doxycycline (VIBRAMYCIN) 100 MG Cap Take 1 capsule (100 mg total) by mouth 2 (two) times daily.    ferrous sulfate 325 (65 FE) MG EC tablet Take 1 tablet (325 mg total) by mouth once daily.    fluconazole (DIFLUCAN) 150 MG Tab Take 1 tablet (150 mg total) by mouth once daily. May take second tab po two days after first if symptoms persist    fluticasone propionate (FLONASE) 50 mcg/actuation nasal spray 2 sprays (100 mcg total) by Each Nostril route once daily.    fluticasone-salmeterol diskus inhaler 250-50 mcg Inhale 1 puff into the lungs 2 (two) times daily. Controller    gabapentin (NEURONTIN) 300 MG capsule TAKE 2 CAPSULES(600 MG) BY MOUTH THREE TIMES DAILY    hydrOXYzine HCL (ATARAX) 25 MG tablet Take 1 tablet (25 mg total) by mouth every 6 (six) hours as needed for itching or anxiety.    LIDOcaine (LIDODERM) 5 % Place 1 patch onto the skin once daily. Remove & Discard patch within 12 hours or as directed by MD    lisinopriL 10 MG tablet Take 1 tablet (10 mg total) by mouth once daily.    loratadine (CLARITIN) 10 mg tablet TAKE 1 TABLET BY MOUTH DAILY    metFORMIN (GLUCOPHAGE) 1000 MG tablet TAKE 1 TABLET(1000 MG) BY MOUTH TWICE DAILY WITH MEALS    methocarbamoL (ROBAXIN) 500 MG Tab Take 1 tablet (500 mg total) by mouth 3 (three) times daily. Frequency could not be confirmed.    metoprolol tartrate (LOPRESSOR) 25 MG tablet Take 1 tablet (25 mg total) by mouth 2 (two) times daily.    multivitamin Tab Take 1 tablet by mouth once daily.     nystatin (NYSTOP) powder APPLY TO ABDOMINAL AND BREAST SKIN FOLD TWICE DAILY.    pantoprazole (PROTONIX) 40 MG tablet Take 1 tablet (40 mg total) by mouth once daily.    pravastatin (PRAVACHOL) 40 MG tablet Take 1 tablet (40 mg total) by mouth every evening.    promethazine-dextromethorphan (PROMETHAZINE-DM) 6.25-15 mg/5 mL Syrp Take 5 mLs by mouth every 6 (six) hours as needed (cough).    risperiDONE (RISPERDAL) 3 MG Tab Take 1 tablet (3 mg total) by mouth in the morning and 1 tablet (3 mg total) in the evening. Indications: Mood.    semaglutide (OZEMPIC) 1 mg/dose (2 mg/1.5 mL) PnIj Inject 1 mg into the skin every 7 days.    traMADoL (ULTRAM) 50 mg tablet Take 1 tablet (50 mg total) by mouth every 8 (eight) hours as needed for Pain (foot pain).    vitamin E 1000 UNIT capsule Take 1,000 Units by mouth once daily.    ammonium lactate (LAC-HYDRIN) 12 % lotion APPL Y ONCE TOPICALLY TWICE DAILY FOR 30 DAYS    BIOFREEZE, MENTHOL, TOP Apply topically.    cyanocobalamin (VITAMIN B-12) 1000 MCG tablet Take 100 mcg by mouth once daily.    DUPIXENT  mg/2 mL PnIj Inject into the skin every 14 (fourteen) days.    VYVANSE 40 mg Cap Take 40 mg by mouth once daily.    [DISCONTINUED] diclofenac sodium (VOLTAREN) 1 % Gel Apply 2 g topically 4 (four) times daily as needed (Apply to painful area up to 4 times a day as needed for pain). Apply to painful area 4 times a day as needed for pain    [DISCONTINUED] divalproex (DEPAKOTE) 500 MG TbEC Take 1 tablet by mouth every morning (Patient not taking: Reported on 9/28/2023)    [DISCONTINUED] furosemide (LASIX) 20 MG tablet TAKE 1 TABLET(20 MG) BY MOUTH EVERY DAY    [DISCONTINUED] hydrOXYzine (ATARAX) 50 MG tablet Take 0.5 tablets (25 mg total) by mouth 4 (four) times daily as needed for Itching or Anxiety.    [DISCONTINUED] insulin glargine 100 units/mL SubQ pen Inject 10 Units under the skin every morning Indications: Type 2 Diabetes.    [DISCONTINUED] insulin  "lispro 100 unit/mL pen Inject 0-16 Units under the skin 4 (four) times a day before meals and nightly Indications: High Blood Sugar. 151-200 4 units 201-250 8 units 251-300 10 units 301-350 12 units 351-400 16 units >400 16 units & Call Medical Provider.    [DISCONTINUED] methylPREDNISolone (MEDROL DOSEPACK) 4 mg tablet follow package directions    [DISCONTINUED] nicotine (NICODERM CQ) 14 mg/24 hr Place 1 patch onto the skin once daily. (Patient not taking: Reported on 2023)    [DISCONTINUED] ondansetron (ZOFRAN) 8 MG tablet Take 1 tablet (8 mg total) by mouth every 8 (eight) hours as needed for Nausea. (Patient not taking: Reported on 2023)    [DISCONTINUED] pen needle, diabetic (BD ERIC 2ND GEN PEN NEEDLE) 32 gauge x 5/32" Ndle Use with insulin 5 times daily    [DISCONTINUED] polyethylene glycol (GLYCOLAX) 17 gram/dose powder Mix 1 capful (17 g) with fluids and drink by mouth once daily. (Patient not taking: Reported on 2023)    [DISCONTINUED] QUEtiapine (SEROQUEL) 200 MG Tab Take 1 tablet (200 mg total) by mouth before breakfast.    [DISCONTINUED] senna-docusate 8.6-50 mg (PERICOLACE) 8.6-50 mg per tablet Take 1 tablet by mouth once daily. (Patient not taking: Reported on 2023)     Family History       Problem Relation (Age of Onset)    Cataracts Father    Diabetes Father, Paternal Grandfather    Heart disease Father, Paternal Grandfather    Hypertension Father    No Known Problems Mother, Sister, Brother, Maternal Aunt, Maternal Uncle, Paternal Aunt, Paternal Uncle, Maternal Grandfather    Ovarian cancer Maternal Grandmother, Paternal Grandmother          Tobacco Use    Smoking status: Former     Current packs/day: 0.00     Average packs/day: 0.5 packs/day for 37.0 years (18.5 ttl pk-yrs)     Types: Cigarettes     Start date: 1983     Quit date: 2020     Years since quittin.8    Smokeless tobacco: Current    Tobacco comments:     Enrolled in the [employer] on " 5/3/14 ([employer] Member ID # [id number]). Ambulatory referral to Smoking Cessation Program   Substance and Sexual Activity    Alcohol use: No     Alcohol/week: 0.0 standard drinks of alcohol    Drug use: No    Sexual activity: Yes     Partners: Male     Review of Systems   Constitutional: Negative.    HENT: Negative.     Eyes: Negative.    Respiratory: Negative.     Cardiovascular: Negative.    Gastrointestinal: Negative.    Endocrine: Negative.    Genitourinary: Negative.    Musculoskeletal: Negative.    Skin:  Positive for rash.   Allergic/Immunologic: Negative.    Neurological: Negative.         Eye swelling   Psychiatric/Behavioral: Negative.       Objective:     Vital Signs (Most Recent):  Temp: 98.5 °F (36.9 °C) (10/06/23 1803)  Pulse: 84 (10/06/23 2034)  Resp: 18 (10/06/23 1803)  BP: (!) 147/67 (10/06/23 2034)  SpO2: 95 % (10/06/23 2034) Vital Signs (24h Range):  Temp:  [98.4 °F (36.9 °C)-98.5 °F (36.9 °C)] 98.5 °F (36.9 °C)  Pulse:  [75-91] 84  Resp:  [16-18] 18  SpO2:  [95 %-97 %] 95 %  BP: (134-147)/(62-67) 147/67     Weight: 90.3 kg (199 lb)  Body mass index is 32.12 kg/m².     Physical Exam  Vitals and nursing note reviewed.   Constitutional:       General: She is not in acute distress.     Appearance: Normal appearance. She is not ill-appearing.   HENT:      Head: Normocephalic and atraumatic.      Nose: Nose normal.      Mouth/Throat:      Mouth: Mucous membranes are moist.   Eyes:      Extraocular Movements: Extraocular movements intact.   Cardiovascular:      Rate and Rhythm: Normal rate.      Pulses: Normal pulses.      Heart sounds: No murmur heard.  Pulmonary:      Effort: Pulmonary effort is normal. No respiratory distress.   Abdominal:      General: Abdomen is flat.      Palpations: Abdomen is soft.      Tenderness: There is no abdominal tenderness.   Musculoskeletal:      Right lower leg: No edema.      Left lower leg: No edema.      Comments: Left BKA   Skin:     General: Skin is warm.       Capillary Refill: Capillary refill takes less than 2 seconds.      Findings: Rash (On scalp) present.   Neurological:      General: No focal deficit present.      Mental Status: She is alert.      Comments: Periorbital swelling and erythema. No limitation of eye movements. No pain with eye movements.    Psychiatric:         Mood and Affect: Mood normal.                        Significant Labs: All pertinent labs within the past 24 hours have been reviewed.    Significant Imaging: I have reviewed all pertinent imaging results/findings within the past 24 hours.    Assessment/Plan:     * Preseptal cellulitis  CT head/orbit showed left periorbital soft tissue swelling and edema (periorbital/preseptal cellulitis in the right clinical setting with no intraorbital extension or other acute orbital abnormalities identified).   Doubt orbital cellulitis   Continue ceftriaxone/vancomycin   F/U cultures     PAD (peripheral artery disease)  No acute issues. Resume home medications       Chronic deep vein thrombosis (DVT) of both lower extremities  Resume Eliquis       SHAWN (obstructive sleep apnea)  Not on CPAP   Outpatient follow up with sleep medicine       History of pulmonary embolism  Resume Eliquis      Type 2 diabetes mellitus  Patient's FSGs are controlled on current medication regimen.  Last A1c reviewed-   Lab Results   Component Value Date    HGBA1C 7.4 (H) 03/15/2023     Most recent fingerstick glucose reviewed-   Recent Labs   Lab 10/06/23  1506   POCTGLUCOSE 349*     Current correctional scale  Low  Maintain anti-hyperglycemic dose as follows-   Antihyperglycemics (From admission, onward)    Start     Stop Route Frequency Ordered    10/06/23 2133  insulin aspart U-100 pen 0-5 Units         -- SubQ Before meals & nightly PRN 10/06/23 2033        Hold Oral hypoglycemics while patient is in the hospital.    Bipolar 1 disorder  Resume home medications       Tobacco abuse  Nicotine patch PRN    Hyperlipidemia  Resume home  medications       COPD (chronic obstructive pulmonary disease)  No acute issues. PRN Duo-Nebs    Essential hypertension  Resume home medications         VTE Risk Mitigation (From admission, onward)         Ordered     apixaban tablet 5 mg  2 times daily         10/06/23 2033     IP VTE HIGH RISK PATIENT  Once         10/06/23 2033     Place sequential compression device  Until discontinued         10/06/23 2033               Favian Mcdaniel MD  Department of Hospital Medicine  [facility] - Emergency Dept

## 2023-10-07 NOTE — CLINICAL REVIEW
IP Sepsis Screen (most recent)       Sepsis Screen (IP) - 10/07/23 6530       Is the patient's history or complaint suggestive of a possible infection? Yes  -CB    Are there at least two of the following signs and symptoms present? Yes  -CB    Sepsis signs/symptoms - Tachycardia Tachycardia     >90  -CB    Sepsis signs/symptoms - WBC WBC < 4,000 or WBC > 12,000  -CB    Are any of the following organ dysfunction criteria present and not considered to be due to a chronic condition? Yes  -CB    Organ Dysfunction Criteria Lactate > 2.0  -CB    Initiate Sepsis Protocol No  -CB    Reason sepsis not considered Pt. receiving appropriate management   ID following, on abx -CB              User Key  (r) = Recorded By, (t) = Taken By, (c) = Cosigned By      Initials Name    Cora Carcamo RN

## 2023-10-07 NOTE — NURSING
[facility]  Nurses Note -- 4 Eyes      10/7/2023       Skin assessed on: Q Shift      [x] No Pressure Injuries Present    []Prevention Measures Documented    [] Yes LDA  for Pressure Injury Previously documented     [] Yes New Pressure Injury Discovered   [] LDA for New Pressure Injury Added      Attending RN:  Dotty Clement RN     Second RN:  JACKY Riley

## 2023-10-07 NOTE — ASSESSMENT & PLAN NOTE
Patient's FSGs are controlled on current medication regimen.  Last A1c reviewed-   Lab Results   Component Value Date    HGBA1C 7.4 (H) 03/15/2023     Most recent fingerstick glucose reviewed-   Recent Labs   Lab 10/07/23  0808 10/07/23  1139 10/07/23  1139 10/07/23  1143   POCTGLUCOSE 387* 415* 438* 406*     Current correctional scale  Low  Maintain anti-hyperglycemic dose as follows-   Antihyperglycemics (From admission, onward)    Start     Stop Route Frequency Ordered    10/07/23 1230  insulin detemir U-100 (Levemir) pen 18 Units         -- SubQ 2 times daily 10/07/23 1218    10/07/23 0806  insulin aspart U-100 pen 0-10 Units         -- SubQ Before meals & nightly PRN 10/07/23 0707        Hold Oral hypoglycemics while patient is in the hospital.  Glucose going into the 400s, will start insulin  A1c's have been great outpt.. unclear if this 2/2 infection or infection is due to suddenly poorly controlled glucose

## 2023-10-07 NOTE — PROGRESS NOTES
Pharmacokinetic Initial Assessment: IV Vancomycin    Assessment/Plan:    Initiate intravenous vancomycin with loading dose of 2250 mg once followed by a maintenance dose of vancomycin 1500 mg IV every 12 hours  Desired empiric serum trough concentration is 10 to 20 mcg/mL  Draw vancomycin trough level 60 min prior to fourth dose on 10/8/23 at approximately 05:00  Pharmacy will continue to follow and monitor vancomycin.      Please contact pharmacy at extension [phone] with any questions regarding this assessment.     Thank you for the consult,   Imani Arredondo       Patient brief summary:  Audrey Natarajan is a 53 y.o. female initiated on antimicrobial therapy with IV Vancomycin for treatment of suspected skin & soft tissue infection    Drug Allergies:   Review of patient's allergies indicates:   Allergen Reactions    Morphine Other (See Comments)     Patient had a psychotic episode after taking Morphine  Agitation, hallucinations  Other Reaction(s): Other (See Comments), Other (See Comments)    Patient had a psychotic episode after taking Morphine    Patient had a psychotic episode after taking Morphine Agitation, hallucinations    Penicillins Anaphylaxis     Tolerated cephalosporins in the past    Januvia [sitagliptin] Hives    Carbamazepine Other (See Comments)     hyponatremia  Other Reaction(s): Other (See Comments)    hyponatremia       Actual Body Weight:   90.3 kg    Renal Function:   Estimated Creatinine Clearance: 94.2 mL/min (based on SCr of 0.8 mg/dL).,     Dialysis Method (if applicable):  N/A    CBC (last 72 hours):  Recent Labs   Lab Result Units 10/06/23  1627   WBC K/uL 15.77*   Hemoglobin g/dL 10.9*   Hematocrit % 36.8*   Platelets K/uL 465*   Gran % % 50.3   Lymph % % 34.2   Mono % % 12.0   Eosinophil % % 1.8   Basophil % % 0.7   Differential Method  Automated       Metabolic Panel (last 72 hours):  Recent Labs   Lab Result Units 10/06/23  1627 10/06/23  1732   Sodium mmol/L 136  --   "  Potassium mmol/L 4.3  --    Chloride mmol/L 97  --    CO2 mmol/L 22*  --    Glucose mg/dL 370*  --    Glucose, UA   --  4+*   BUN mg/dL 6  --    Creatinine mg/dL 0.8  --    Albumin g/dL 2.9*  --    Total Bilirubin mg/dL 0.1  --    Alkaline Phosphatase U/L 85  --    AST U/L 15  --    ALT U/L 12  --        Drug levels (last 3 results):  No results for input(s): "VANCOMYCINRA", "VANCORANDOM", "VANCOMYCINPE", "VANCOPEAK", "VANCOMYCINTR", "VANCOTROUGH" in the last 72 hours.    Microbiologic Results:  Microbiology Results (last 7 days)       Procedure Component Value Units Date/Time    Blood culture #2 **CANNOT BE ORDERED STAT** [[id number]] Collected: 10/06/23 1628    Order Status: Sent Specimen: Blood from Peripheral, Antecubital, Right Updated: 10/06/23 1644    Blood culture #1 **CANNOT BE ORDERED STAT** [[id number]] Collected: 10/06/23 1638    Order Status: Sent Specimen: Blood from Peripheral, Hand, Right Updated: 10/06/23 1644            "

## 2023-10-07 NOTE — SUBJECTIVE & OBJECTIVE
"Past Medical History:   Diagnosis Date    ADHD (attention deficit hyperactivity disorder)     Arthritis     Asthma     Bipolar 1 disorder     Cataract     Cigarette smoker     COPD (chronic obstructive pulmonary disease)     Coronary artery disease     A fib    Depression     bipolar manic depresson    Diabetes mellitus     Diabetic foot ulcers     Diabetic neuropathy     DVT of lower extremity, bilateral 07/2013    bilateral LE DVT. [place] filter placed.     Encounter for blood transfusion     History of blood clots 1. Left Leg=2003; 2.Bilateral Groin=Blood Clots= 5 or 6/ 2013 & 7/2013; 3. LLL of Lung=7/2013;  4. Lt. Lower Leg=7/2013.     Pt. had 1st Blood Clot after Djrbntapqeiu=6505, & Last=2013. Kotlik Filter= Rt.Lateral Neck.    HTN (hypertension) 06/06/2013    Pt states that she does not have hypertension    Hypercholesteremia     Irregular heartbeat     Neuromuscular disorder     neuropathy feet    Obese     PE (pulmonary embolism) 07/2013    bilat LE DVT.     Restless leg syndrome        Past Surgical History:   Procedure Laterality Date    ABDOMINAL SURGERY  2010    gastric sleeve    BELOW KNEE AMPUTATION OF LOWER EXTREMITY Left 4/19/2023    Procedure: AMPUTATION, BELOW KNEE;  Surgeon: Gabe Munoz MD;  Location: [facility] OR;  Service: General;  Laterality: Left;  RN PREOP 4/11/2023    BILATERAL OOPHORECTOMY Bilateral 1/12/2015    CHOLECYSTECTOMY      DEBRIDEMENT OF FOOT Bilateral 5/10/2022    Procedure: DEBRIDEMENT, FOOT;  Surgeon: Maira De Los Santos DPM;  Location: [facility] OR;  Service: Podiatry;  Laterality: Bilateral;    DEBRIDEMENT OF FOOT Left 2/28/2023    Procedure: DEBRIDEMENT, FOOT,biopsy;  Surgeon: Maira De Los Santos DPM;  Location: [facility] OR;  Service: Podiatry;  Laterality: Left;  request misonix, wound VAC, possible neoxx    Green' s filter Right 7/4/2012    Right Neck & Tunneled Down.    HERNIA REPAIR      "Terral of Hernias Repaires around th Belly Button.", pt. states    INCISION AND DRAINAGE " FOOT Left 12/24/2022    Procedure: INCISION AND DRAINAGE, FOOT;  Surgeon: Fahad Razo DPM;  Location: [facility] OR;  Service: Podiatry;  Laterality: Left;    LAPAROSCOPIC CHOLECYSTECTOMY N/A 9/10/2020    Procedure: CHOLECYSTECTOMY, LAPAROSCOPIC;  Surgeon: Montrell Gutierrez MD;  Location: [facility] OR;  Service: General;  Laterality: N/A;  RN PREOP 9/9----COVID Negative  9/9    OVARIAN CYST REMOVAL  3/13/2014    TX REMOVAL OF OVARY/TUBE(S)      SPLENECTOMY, TOTAL  July 2003    TONSILLECTOMY      as a child    TYMPANOSTOMY TUBE PLACEMENT  1976    VEIN SURGERY  2003    Lt leg       Review of patient's allergies indicates:   Allergen Reactions    Morphine Other (See Comments)     Patient had a psychotic episode after taking Morphine  Agitation, hallucinations  Other Reaction(s): Other (See Comments), Other (See Comments)    Patient had a psychotic episode after taking Morphine    Patient had a psychotic episode after taking Morphine Agitation, hallucinations    Penicillins Anaphylaxis     Tolerated cephalosporins in the past    Januvia [sitagliptin] Hives    Carbamazepine Other (See Comments)     hyponatremia  Other Reaction(s): Other (See Comments)    hyponatremia       Medications:  Medications Prior to Admission   Medication Sig    albuterol (PROVENTIL/VENTOLIN HFA) 90 mcg/actuation inhaler INHALE 2 PUFFS INTO THE LUNGS EVERY 6 HOURS AS NEEDED FOR WHEEZING. RESCUE    albuterol-ipratropium (DUO-NEB) 2.5 mg-0.5 mg/3 mL nebulizer solution Take 3 mLs by nebulization every 6 (six) hours as needed for Wheezing or Shortness of Breath. Rescue    apixaban (ELIQUIS) 5 mg Tab Take 1 tablet (5 mg total) by mouth in the morning and 1 tablet (5 mg total) in the evening. Indications: Thromboembolism secondary to Atrial Fibrillation.    aspirin 81 MG Chew Take 1 tablet (81 mg total) by mouth once daily.    benzonatate (TESSALON) 100 MG capsule Take 1 capsule (100 mg total) by mouth 3 (three) times daily as needed for Cough.    bumetanide  (BUMEX) 1 MG tablet Take 1 tablet (1 mg total) by mouth once daily.    divalproex (DEPAKOTE) 500 MG TbEC Take 1 tablet (500 mg total) by mouth once daily. PO QAM (Patient taking differently: Take 500 mg by mouth every evening.)    doxycycline (VIBRAMYCIN) 100 MG Cap Take 1 capsule (100 mg total) by mouth 2 (two) times daily.    ferrous sulfate 325 (65 FE) MG EC tablet Take 1 tablet (325 mg total) by mouth once daily.    fluconazole (DIFLUCAN) 150 MG Tab Take 1 tablet (150 mg total) by mouth once daily. May take second tab po two days after first if symptoms persist    fluticasone propionate (FLONASE) 50 mcg/actuation nasal spray 2 sprays (100 mcg total) by Each Nostril route once daily.    fluticasone-salmeterol diskus inhaler 250-50 mcg Inhale 1 puff into the lungs 2 (two) times daily. Controller    gabapentin (NEURONTIN) 300 MG capsule TAKE 2 CAPSULES(600 MG) BY MOUTH THREE TIMES DAILY    hydrOXYzine HCL (ATARAX) 25 MG tablet Take 1 tablet (25 mg total) by mouth every 6 (six) hours as needed for itching or anxiety.    LIDOcaine (LIDODERM) 5 % Place 1 patch onto the skin once daily. Remove & Discard patch within 12 hours or as directed by MD    lisinopriL 10 MG tablet Take 1 tablet (10 mg total) by mouth once daily.    loratadine (CLARITIN) 10 mg tablet TAKE 1 TABLET BY MOUTH DAILY    metFORMIN (GLUCOPHAGE) 1000 MG tablet TAKE 1 TABLET(1000 MG) BY MOUTH TWICE DAILY WITH MEALS    methocarbamoL (ROBAXIN) 500 MG Tab Take 1 tablet (500 mg total) by mouth 3 (three) times daily. Frequency could not be confirmed.    metoprolol tartrate (LOPRESSOR) 25 MG tablet Take 1 tablet (25 mg total) by mouth 2 (two) times daily.    multivitamin Tab Take 1 tablet by mouth once daily.    nystatin (NYSTOP) powder APPLY TO ABDOMINAL AND BREAST SKIN FOLD TWICE DAILY.    pantoprazole (PROTONIX) 40 MG tablet Take 1 tablet (40 mg total) by mouth once daily.    pravastatin (PRAVACHOL) 40 MG tablet Take 1 tablet (40 mg total) by mouth every  evening.    promethazine-dextromethorphan (PROMETHAZINE-DM) 6.25-15 mg/5 mL Syrp Take 5 mLs by mouth every 6 (six) hours as needed (cough).    risperiDONE (RISPERDAL) 3 MG Tab Take 1 tablet (3 mg total) by mouth in the morning and 1 tablet (3 mg total) in the evening. Indications: Mood.    semaglutide (OZEMPIC) 1 mg/dose (2 mg/1.5 mL) PnIj Inject 1 mg into the skin every 7 days.    traMADoL (ULTRAM) 50 mg tablet Take 1 tablet (50 mg total) by mouth every 8 (eight) hours as needed for Pain (foot pain).    vitamin E 1000 UNIT capsule Take 1,000 Units by mouth once daily.    ammonium lactate (LAC-HYDRIN) 12 % lotion APPL Y ONCE TOPICALLY TWICE DAILY FOR 30 DAYS    BIOFREEZE, MENTHOL, TOP Apply topically.    cyanocobalamin (VITAMIN B-12) 1000 MCG tablet Take 100 mcg by mouth once daily.    DUPIXENT  mg/2 mL PnIj Inject into the skin every 14 (fourteen) days.    VYVANSE 40 mg Cap Take 40 mg by mouth once daily.     Antibiotics (From admission, onward)      Start     Stop Route Frequency Ordered    10/07/23 1600  cefTRIAXone (ROCEPHIN) 2 g in dextrose 5 % in water (D5W) 100 mL IVPB (MB+)         -- IV Every 24 hours (non-standard times) 10/06/23 2034    10/07/23 0900  mupirocin 2 % ointment         10/12/23 0859 Nasl 2 times daily 10/07/23 0707    10/07/23 0600  vancomycin 1,500 mg in dextrose 5 % (D5W) 250 mL IVPB (Vial-Mate)         -- IV Every 12 hours (non-standard times) 10/06/23 2049    10/06/23 2133  vancomycin - pharmacy to dose  (vancomycin IVPB (PEDS and ADULTS))        See Hyperspace for full Linked Orders Report.    -- IV pharmacy to manage frequency 10/06/23 2034          Antifungals (From admission, onward)      None          Antivirals (From admission, onward)      None             Immunization History   Administered Date(s) Administered    Hepatitis B, Adult 01/09/2014, 02/13/2014    Influenza 01/14/2015    Influenza - Quadrivalent 09/18/2015, 09/27/2022    Influenza - Quadrivalent - PF *Preferred*  (6 months and older) 2016, 10/24/2017, 10/31/2018, 2019, 2020, 2022, 2022    Influenza - Trivalent - PF (ADULT) 2015    Pneumococcal Polysaccharide - 23 Valent 10/24/2017    Tdap 2014       Family History       Problem Relation (Age of Onset)    Cataracts Father    Diabetes Father, Paternal Grandfather    Heart disease Father, Paternal Grandfather    Hypertension Father    No Known Problems Mother, Sister, Brother, Maternal Aunt, Maternal Uncle, Paternal Aunt, Paternal Uncle, Maternal Grandfather    Ovarian cancer Maternal Grandmother, Paternal Grandmother          Social History     Socioeconomic History    Marital status: Significant Other   Tobacco Use    Smoking status: Former     Current packs/day: 0.00     Average packs/day: 0.5 packs/day for 37.0 years (18.5 ttl pk-yrs)     Types: Cigarettes     Start date: 1983     Quit date: 2020     Years since quittin.8    Smokeless tobacco: Current    Tobacco comments:     Enrolled in the [employer] on 5/3/14 ([facility] Member ID # [id number]). Ambulatory referral to Smoking Cessation Program   Substance and Sexual Activity    Alcohol use: No     Alcohol/week: 0.0 standard drinks of alcohol    Drug use: No    Sexual activity: Yes     Partners: Male   Social History Narrative     from her  of 20 years    Lives by herself since 2019     Social Determinants of Health     Financial Resource Strain: Medium Risk (3/6/2023)    Overall Financial Resource Strain (CARDIA)     Difficulty of Paying Living Expenses: Somewhat hard   Food Insecurity: Food Insecurity Present (3/6/2023)    Hunger Vital Sign     Worried About Running Out of Food in the Last Year: Often true     Ran Out of Food in the Last Year: Often true   Transportation Needs: Unmet Transportation Needs (3/6/2023)    PRAPARE - Transportation     Lack of Transportation (Medical): Yes     Lack of Transportation (Non-Medical): Yes   Physical Activity:  Inactive (3/6/2023)    Exercise Vital Sign     Days of Exercise per Week: 0 days     Minutes of Exercise per Session: 0 min   Stress: Stress Concern Present (3/6/2023)    [employer] - Occupational Stress Questionnaire     Feeling of Stress : To some extent   Social Connections: Moderately Isolated (3/6/2023)    Social Connection and Isolation Panel [NHANES]     Frequency of Communication with Friends and Family: More than three times a week     Frequency of Social Gatherings with Friends and Family: More than three times a week     Attends Worship Services: 1 to 4 times per year     Active Member of Clubs or Organizations: No     Attends Club or Organization Meetings: Never     Marital Status: Never    Housing Stability: Low Risk  (3/20/2023)    Housing Stability Vital Sign     Unable to Pay for Housing in the Last Year: No     Number of Places Lived in the Last Year: 1     Unstable Housing in the Last Year: No     Review of Systems   Constitutional:  Positive for chills. Negative for fever.   Eyes:  Positive for pain. Negative for discharge.   All other systems reviewed and are negative.    Objective:     Vital Signs (Most Recent):  Temp: 97.9 °F (36.6 °C) (10/07/23 0809)  Pulse: 89 (10/07/23 0905)  Resp: 16 (10/07/23 0905)  BP: (!) 106/53 (10/07/23 0809)  SpO2: (!) 93 % (10/07/23 0905) Vital Signs (24h Range):  Temp:  [97.7 °F (36.5 °C)-98.5 °F (36.9 °C)] 97.9 °F (36.6 °C)  Pulse:  [75-91] 89  Resp:  [16-20] 16  SpO2:  [93 %-97 %] 93 %  BP: (106-190)/(53-79) 106/53     Weight: 115.6 kg (254 lb 13.6 oz)  Body mass index is 41.13 kg/m².    Estimated Creatinine Clearance: 107.4 mL/min (based on SCr of 0.8 mg/dL).     Physical Exam  Vitals and nursing note reviewed.   Constitutional:       General: She is not in acute distress.     Appearance: Normal appearance. She is not ill-appearing, toxic-appearing or diaphoretic.   HENT:      Head: Normocephalic and atraumatic.      Right  "Ear: External ear normal.      Left Ear: External ear normal.      Nose: Nose normal.   Eyes:      Pupils: Pupils are equal, round, and reactive to light.   Cardiovascular:      Rate and Rhythm: Normal rate and regular rhythm.   Abdominal:      Tenderness: There is no abdominal tenderness. There is no guarding.   Skin:     Comments: Scattered crusted ulcers, preseptal swelling, left eye   Neurological:      General: No focal deficit present.      Mental Status: She is alert and oriented to person, place, and time.   Psychiatric:         Mood and Affect: Mood normal.         Thought Content: Thought content normal.         Judgment: Judgment normal.          Significant Labs: Blood Culture:   Recent Labs   Lab 05/15/23  2313 05/15/23  2321 10/06/23  1628 10/06/23  1638   LABBLOO No Growth after 4 days. No Growth after 4 days. No Growth to date No Growth to date     CBC:   Recent Labs   Lab 10/06/23  1627 10/06/23  2349 10/07/23  0512   WBC 15.77* 13.32* 16.40*   HGB 10.9* 9.9* 10.6*   HCT 36.8* 32.8* 37.7   * 417 465*     Urine Culture: No results for input(s): "LABURIN" in the last 4320 hours.  Wound Culture: No results for input(s): "LABAERO" in the last 4320 hours.    Significant Imaging: I have reviewed all pertinent imaging results/findings within the past 24 hours.  "

## 2023-10-07 NOTE — CONSULTS
"[facility] Surg  Infectious Disease  Consult Note    Patient Name: Audrey Natarajan  MRN: [id number]  Admission Date: 10/6/2023  Hospital Length of Stay: 1 days  Attending Physician: Jarocho Weldon MD  Primary Care Provider: Donaldo Pena MD     Isolation Status: No active isolations    Patient information was obtained from patient, past medical records and ER records.      Inpatient consult to Infectious Diseases  Consult performed by: Terry Pereira MD  Consult ordered by: Jarocho Weldon MD        Assessment/Plan:     ID  * Preseptal cellulitis  52 yo woman, admitted with preseptal cellulitis, OS  - workup initiated in ED  - empiric abx (CTX and vancomycin)  - given healing skin ulcers, clinically like MRSA  - culture nares  - continue CTX and vancomycin  - trend WBC and clinical response to emipric therapy  - d/w Nursing and Pharmacy    Thank you for your consult. I will follow-up with patient. Please contact us if you have any additional questions.    Terry Pereira MD  Infectious Disease  [facility] [place] [facility]    Subjective:     Principal Problem: Preseptal cellulitis    HPI: 52 yo woman admitted with preseptal cellulitis of the left eye. The patient has a remote history of "boils" and recently developed URI symptoms and swelling of her right face. She tells me that she kept touching the area (she is an admitted "") and subsequently developed "sores" on her head and left forehead. When her eye started to swell, she came to the ED and was admitted. The patient underwent CT imaging which confirmed that diagnosis. She was started on empiric bx after blood cultures were obtained and ID is consulted.      Past Medical History:   Diagnosis Date    ADHD (attention deficit hyperactivity disorder)     Arthritis     Asthma     Bipolar 1 disorder     Cataract     Cigarette smoker     COPD (chronic obstructive pulmonary disease)     Coronary artery disease     A fib    Depression     " "bipolar manic depresson    Diabetes mellitus     Diabetic foot ulcers     Diabetic neuropathy     DVT of lower extremity, bilateral 07/2013    bilateral LE DVT. Estelita filter placed.     Encounter for blood transfusion     History of blood clots 1. Left Leg=2003; 2.Bilateral Groin=Blood Clots= 5 or 6/ 2013 & 7/2013; 3. LLL of Lung=7/2013;  4. Lt. Lower Leg=7/2013.     Pt. had 1st Blood Clot after Rhifvyvvqrpl=7857, & Last=2013. [place] Filter= Rt.Lateral Neck.    HTN (hypertension) 06/06/2013    Pt states that she does not have hypertension    Hypercholesteremia     Irregular heartbeat     Neuromuscular disorder     neuropathy feet    Obese     PE (pulmonary embolism) 07/2013    bilat LE DVT.     Restless leg syndrome        Past Surgical History:   Procedure Laterality Date    ABDOMINAL SURGERY  2010    gastric sleeve    BELOW KNEE AMPUTATION OF LOWER EXTREMITY Left 4/19/2023    Procedure: AMPUTATION, BELOW KNEE;  Surgeon: Gabe Munoz MD;  Location: [facility] OR;  Service: General;  Laterality: Left;  RN PREOP 4/11/2023    BILATERAL OOPHORECTOMY Bilateral 1/12/2015    CHOLECYSTECTOMY      DEBRIDEMENT OF FOOT Bilateral 5/10/2022    Procedure: DEBRIDEMENT, FOOT;  Surgeon: Maira De Los Santos DPM;  Location: [facility] OR;  Service: Podiatry;  Laterality: Bilateral;    DEBRIDEMENT OF FOOT Left 2/28/2023    Procedure: DEBRIDEMENT, FOOT,biopsy;  Surgeon: Maira De Los Santos DPM;  Location: [facility] OR;  Service: Podiatry;  Laterality: Left;  request misonix, wound VAC, possible neoxx    Green' s filter Right 7/4/2012    Right Neck & Tunneled Down.    HERNIA REPAIR      "[place] [facility].", pt. states    INCISION AND DRAINAGE FOOT Left 12/24/2022    Procedure: INCISION AND DRAINAGE, FOOT;  Surgeon: Fahad Razo DPM;  Location: [facility] OR;  Service: Podiatry;  Laterality: Left;    LAPAROSCOPIC CHOLECYSTECTOMY N/A 9/10/2020    Procedure: CHOLECYSTECTOMY, LAPAROSCOPIC;  Surgeon: Montrlel " ADONIS Gutierrez MD;  Location: [facility];  Service: General;  Laterality: N/A;  RN PREOP 9/9----COVID Negative  9/9    OVARIAN CYST REMOVAL  3/13/2014    FL REMOVAL OF OVARY/TUBE(S)      SPLENECTOMY, TOTAL  July 2003    TONSILLECTOMY      as a child    TYMPANOSTOMY TUBE PLACEMENT  1976    VEIN SURGERY  2003    Lt leg       Review of patient's allergies indicates:   Allergen Reactions    Morphine Other (See Comments)     Patient had a psychotic episode after taking Morphine  Agitation, hallucinations  Other Reaction(s): Other (See Comments), Other (See Comments)    Patient had a psychotic episode after taking Morphine    Patient had a psychotic episode after taking Morphine Agitation, hallucinations    Penicillins Anaphylaxis     Tolerated cephalosporins in the past    Januvia [sitagliptin] Hives    Carbamazepine Other (See Comments)     hyponatremia  Other Reaction(s): Other (See Comments)    hyponatremia       Medications:  Medications Prior to Admission   Medication Sig    albuterol (PROVENTIL/VENTOLIN HFA) 90 mcg/actuation inhaler INHALE 2 PUFFS INTO THE LUNGS EVERY 6 HOURS AS NEEDED FOR WHEEZING. RESCUE    albuterol-ipratropium (DUO-NEB) 2.5 mg-0.5 mg/3 mL nebulizer solution Take 3 mLs by nebulization every 6 (six) hours as needed for Wheezing or Shortness of Breath. Rescue    apixaban (ELIQUIS) 5 mg Tab Take 1 tablet (5 mg total) by mouth in the morning and 1 tablet (5 mg total) in the evening. Indications: Thromboembolism secondary to Atrial Fibrillation.    aspirin 81 MG Chew Take 1 tablet (81 mg total) by mouth once daily.    benzonatate (TESSALON) 100 MG capsule Take 1 capsule (100 mg total) by mouth 3 (three) times daily as needed for Cough.    bumetanide (BUMEX) 1 MG tablet Take 1 tablet (1 mg total) by mouth once daily.    divalproex (DEPAKOTE) 500 MG TbEC Take 1 tablet (500 mg total) by mouth once daily. PO QAM (Patient taking differently: Take 500 mg by mouth every evening.)    doxycycline  (VIBRAMYCIN) 100 MG Cap Take 1 capsule (100 mg total) by mouth 2 (two) times daily.    ferrous sulfate 325 (65 FE) MG EC tablet Take 1 tablet (325 mg total) by mouth once daily.    fluconazole (DIFLUCAN) 150 MG Tab Take 1 tablet (150 mg total) by mouth once daily. May take second tab po two days after first if symptoms persist    fluticasone propionate (FLONASE) 50 mcg/actuation nasal spray 2 sprays (100 mcg total) by Each Nostril route once daily.    fluticasone-salmeterol diskus inhaler 250-50 mcg Inhale 1 puff into the lungs 2 (two) times daily. Controller    gabapentin (NEURONTIN) 300 MG capsule TAKE 2 CAPSULES(600 MG) BY MOUTH THREE TIMES DAILY    hydrOXYzine HCL (ATARAX) 25 MG tablet Take 1 tablet (25 mg total) by mouth every 6 (six) hours as needed for itching or anxiety.    LIDOcaine (LIDODERM) 5 % Place 1 patch onto the skin once daily. Remove & Discard patch within 12 hours or as directed by MD    lisinopriL 10 MG tablet Take 1 tablet (10 mg total) by mouth once daily.    loratadine (CLARITIN) 10 mg tablet TAKE 1 TABLET BY MOUTH DAILY    metFORMIN (GLUCOPHAGE) 1000 MG tablet TAKE 1 TABLET(1000 MG) BY MOUTH TWICE DAILY WITH MEALS    methocarbamoL (ROBAXIN) 500 MG Tab Take 1 tablet (500 mg total) by mouth 3 (three) times daily. Frequency could not be confirmed.    metoprolol tartrate (LOPRESSOR) 25 MG tablet Take 1 tablet (25 mg total) by mouth 2 (two) times daily.    multivitamin Tab Take 1 tablet by mouth once daily.    nystatin (NYSTOP) powder APPLY TO ABDOMINAL AND BREAST SKIN FOLD TWICE DAILY.    pantoprazole (PROTONIX) 40 MG tablet Take 1 tablet (40 mg total) by mouth once daily.    pravastatin (PRAVACHOL) 40 MG tablet Take 1 tablet (40 mg total) by mouth every evening.    promethazine-dextromethorphan (PROMETHAZINE-DM) 6.25-15 mg/5 mL Syrp Take 5 mLs by mouth every 6 (six) hours as needed (cough).    risperiDONE (RISPERDAL) 3 MG Tab Take 1 tablet (3 mg total) by mouth in the morning  and 1 tablet (3 mg total) in the evening. Indications: Mood.    semaglutide (OZEMPIC) 1 mg/dose (2 mg/1.5 mL) PnIj Inject 1 mg into the skin every 7 days.    traMADoL (ULTRAM) 50 mg tablet Take 1 tablet (50 mg total) by mouth every 8 (eight) hours as needed for Pain (foot pain).    vitamin E 1000 UNIT capsule Take 1,000 Units by mouth once daily.    ammonium lactate (LAC-HYDRIN) 12 % lotion APPL Y ONCE TOPICALLY TWICE DAILY FOR 30 DAYS    BIOFREEZE, MENTHOL, TOP Apply topically.    cyanocobalamin (VITAMIN B-12) 1000 MCG tablet Take 100 mcg by mouth once daily.    DUPIXENT  mg/2 mL PnIj Inject into the skin every 14 (fourteen) days.    VYVANSE 40 mg Cap Take 40 mg by mouth once daily.     Antibiotics (From admission, onward)      Start     Stop Route Frequency Ordered    10/07/23 1600  cefTRIAXone (ROCEPHIN) 2 g in dextrose 5 % in water (D5W) 100 mL IVPB (MB+)         -- IV Every 24 hours (non-standard times) 10/06/23 2034    10/07/23 0900  mupirocin 2 % ointment         10/12/23 0859 Nasl 2 times daily 10/07/23 0707    10/07/23 0600  vancomycin 1,500 mg in dextrose 5 % (D5W) 250 mL IVPB (Vial-Mate)         -- IV Every 12 hours (non-standard times) 10/06/23 2049    10/06/23 2133  vancomycin - pharmacy to dose  (vancomycin IVPB (PEDS and ADULTS))        See Hyperspace for full Linked Orders Report.    -- IV pharmacy to manage frequency 10/06/23 2034          Antifungals (From admission, onward)      None          Antivirals (From admission, onward)      None             Immunization History   Administered Date(s) Administered    Hepatitis B, Adult 01/09/2014, 02/13/2014    Influenza 01/14/2015    Influenza - Quadrivalent 09/18/2015, 09/27/2022    Influenza - Quadrivalent - PF *Preferred* (6 months and older) 11/18/2016, 10/24/2017, 10/31/2018, 09/04/2019, 09/29/2020, 02/03/2022, 09/27/2022    Influenza - Trivalent - PF (ADULT) 01/14/2015    Pneumococcal Polysaccharide - 23 Valent 10/24/2017     Tdap 2014       Family History       Problem Relation (Age of Onset)    Cataracts Father    Diabetes Father, Paternal Grandfather    Heart disease Father, Paternal Grandfather    Hypertension Father    No Known Problems Mother, Sister, Brother, Maternal Aunt, Maternal Uncle, Paternal Aunt, Paternal Uncle, Maternal Grandfather    Ovarian cancer Maternal Grandmother, Paternal Grandmother          Social History     Socioeconomic History    Marital status: Significant Other   Tobacco Use    Smoking status: Former     Current packs/day: 0.00     Average packs/day: 0.5 packs/day for 37.0 years (18.5 ttl pk-yrs)     Types: Cigarettes     Start date: 1983     Quit date: 2020     Years since quittin.8    Smokeless tobacco: Current    Tobacco comments:     Enrolled in the [employer] on 5/3/14 ([facility] Member ID # [id number]). Ambulatory referral to Smoking Cessation Program   Substance and Sexual Activity    Alcohol use: No     Alcohol/week: 0.0 standard drinks of alcohol    Drug use: No    Sexual activity: Yes     Partners: Male   Social History Narrative     from her  of 20 years    Lives by herself since 2019     Social Determinants of Health     Financial Resource Strain: Medium Risk (3/6/2023)    Overall Financial Resource Strain (CARDIA)     Difficulty of Paying Living Expenses: Somewhat hard   Food Insecurity: Food Insecurity Present (3/6/2023)    Hunger Vital Sign     Worried About Running Out of Food in the Last Year: Often true     Ran Out of Food in the Last Year: Often true   Transportation Needs: Unmet Transportation Needs (3/6/2023)    PRAPARE - Transportation     Lack of Transportation (Medical): Yes     Lack of Transportation (Non-Medical): Yes   Physical Activity: Inactive (3/6/2023)    Exercise Vital Sign     Days of Exercise per Week: 0 days     Minutes of Exercise per Session: 0 min   Stress: Stress Concern Present (3/6/2023)    [employer] [place] [employer]  Occupational Health - Occupational Stress Questionnaire     Feeling of Stress : To some extent   Social Connections: Moderately Isolated (3/6/2023)    Social Connection and Isolation Panel [NHANES]     Frequency of Communication with Friends and Family: More than three times a week     Frequency of Social Gatherings with Friends and Family: More than three times a week     Attends Mandaeism Services: 1 to 4 times per year     Active Member of Clubs or Organizations: No     Attends Club or Organization Meetings: Never     Marital Status: Never    Housing Stability: Low Risk  (3/20/2023)    Housing Stability Vital Sign     Unable to Pay for Housing in the Last Year: No     Number of Places Lived in the Last Year: 1     Unstable Housing in the Last Year: No     Review of Systems   Constitutional:  Positive for chills. Negative for fever.   Eyes:  Positive for pain. Negative for discharge.   All other systems reviewed and are negative.    Objective:     Vital Signs (Most Recent):  Temp: 97.9 °F (36.6 °C) (10/07/23 0809)  Pulse: 89 (10/07/23 0905)  Resp: 16 (10/07/23 0905)  BP: (!) 106/53 (10/07/23 0809)  SpO2: (!) 93 % (10/07/23 0905) Vital Signs (24h Range):  Temp:  [97.7 °F (36.5 °C)-98.5 °F (36.9 °C)] 97.9 °F (36.6 °C)  Pulse:  [75-91] 89  Resp:  [16-20] 16  SpO2:  [93 %-97 %] 93 %  BP: (106-190)/(53-79) 106/53     Weight: 115.6 kg (254 lb 13.6 oz)  Body mass index is 41.13 kg/m².    Estimated Creatinine Clearance: 107.4 mL/min (based on SCr of 0.8 mg/dL).     Physical Exam  Vitals and nursing note reviewed.   Constitutional:       General: She is not in acute distress.     Appearance: Normal appearance. She is not ill-appearing, toxic-appearing or diaphoretic.   HENT:      Head: Normocephalic and atraumatic.      Right Ear: External ear normal.      Left Ear: External ear normal.      Nose: Nose normal.   Eyes:      Pupils: Pupils are equal, round, and reactive to light.   Cardiovascular:      Rate  "and Rhythm: Normal rate and regular rhythm.   Abdominal:      Tenderness: There is no abdominal tenderness. There is no guarding.   Skin:     Comments: Scattered crusted ulcers, preseptal swelling, left eye   Neurological:      General: No focal deficit present.      Mental Status: She is alert and oriented to person, place, and time.   Psychiatric:         Mood and Affect: Mood normal.         Thought Content: Thought content normal.         Judgment: Judgment normal.          Significant Labs: Blood Culture:   Recent Labs   Lab 05/15/23  2313 05/15/23  2321 10/06/23  1628 10/06/23  1638   LABBLOO No Growth after 4 days. No Growth after 4 days. No Growth to date No Growth to date     CBC:   Recent Labs   Lab 10/06/23  1627 10/06/23  2349 10/07/23  0512   WBC 15.77* 13.32* 16.40*   HGB 10.9* 9.9* 10.6*   HCT 36.8* 32.8* 37.7   * 417 465*     Urine Culture: No results for input(s): "LABURIN" in the last 4320 hours.  Wound Culture: No results for input(s): "LABAERO" in the last 4320 hours.    Significant Imaging: I have reviewed all pertinent imaging results/findings within the past 24 hours.              "

## 2023-10-07 NOTE — PLAN OF CARE
[facility] - Med Surg  Initial Discharge Assessment       Primary Care Provider: Donaldo Pena MD  Case Management Assessment     PCP: See above  Pharmacy: [facility] [address]    Patient Arrived From: Home alone  Existing Help at Home: friend    Barriers to Discharge: None    Discharge Plan:    A. Home   B. Home      Discharge planning assessment completed with patient's assistance.  Patient from home alone and is independent with assistive devices.  When medically cleared, patient will discharge to home.  She will need to followup with her PCP.  Patient is requesting information on Meals on Wheels.  SW will provide but advised patient that she may not meet criteria for program participation due to age.       Admission Diagnosis: Lactic acidosis [E87.20]  Hyperglycemia [R73.9]  Preseptal cellulitis [L03.213]  Chest pain [R07.9]    Admission Date: 10/6/2023  Expected Discharge Date:     Transition of Care Barriers: None    Payor: MEDICAID / Plan: [place] BLUE [place] / Product Type: Managed Medicaid /     Extended Emergency Contact Information  Primary Emergency Contact: Anitra Cain   [facility]  Home Phone: [phone]  Relation: Sister  Secondary Emergency Contact: Tabitha Man   [facility]  Home Phone: [phone]  Relation: Other  Mother: Catia Weathers  Mobile Phone: [phone]    Discharge Plan A: Home  Discharge Plan B: Home      [id number] - [place] - [address] [place] [address] EXP AT [facility] [address] [facility] [place]  [address]  [place] [address]  Phone: [phone] Fax: [phone]    [facility] [place]  [address]  [address]   [facility] [address]  Phone: [phone] Fax: [phone]      Initial Assessment (most recent)       Adult Discharge Assessment - 10/07/23 1036          Discharge Assessment    Assessment Type Discharge Planning Assessment     Confirmed/corrected address, phone number and insurance Yes     Confirmed  Demographics Correct on Facesheet     Source of Information patient     When was your last doctors appointment? --   3 months ago    Reason For Admission Lactic Acidosis     People in Home alone     Facility Arrived From: home     Do you expect to return to your current living situation? Yes     Do you have help at home or someone to help you manage your care at home? Yes     Who are your caregiver(s) and their phone number(s)? Emergency contacts: Tabitha Man  [phone]     Prior to hospitilization cognitive status: Alert/Oriented     Current cognitive status: Alert/Oriented     Walking or Climbing Stairs ambulation difficulty, requires equipment     Mobility Management wheelchair; knee scooter, walker, cane     Equipment Currently Used at Home wheelchair;walker, rolling;other (see comments);cane, straight   knee scooter    Readmission within 30 days? No     Patient currently being followed by outpatient case management? No     Do you currently have service(s) that help you manage your care at home? No     Do you take prescription medications? Yes     Do you have prescription coverage? Yes     Coverage MEDICAID - [place] [employer] [place]     Do you have any problems affording any of your prescribed medications? No     Is the patient taking medications as prescribed? yes     Who is going to help you get home at discharge? friend     How do you get to doctors appointments? car, drives self     Are you on dialysis? No     Do you take coumadin? No     DME Needed Upon Discharge  none     Discharge Plan discussed with: Patient     Transition of Care Barriers None     Discharge Plan A Home     Discharge Plan B Home

## 2023-10-07 NOTE — PHARMACY MED REC
"Admission Medication History     The home medication history was taken by Erlinda Cervantes.    You may go to "Admission" then "Reconcile Home Medications" tabs to review and/or act upon these items.     The home medication list has been updated by the Pharmacy department.   Please read ALL comments highlighted in yellow.   Please address this information as you see fit.    Feel free to contact us if you have any questions or require assistance.        Medications listed below were obtained from: Patient/family and Analytic software- [employer]  (Not in a hospital admission)          Erlinda Cervantes  [phone]               .          "

## 2023-10-07 NOTE — ASSESSMENT & PLAN NOTE
Patient's FSGs are controlled on current medication regimen.  Last A1c reviewed-   Lab Results   Component Value Date    HGBA1C 7.4 (H) 03/15/2023     Most recent fingerstick glucose reviewed-   Recent Labs   Lab 10/06/23  1506   POCTGLUCOSE 349*     Current correctional scale  Low  Maintain anti-hyperglycemic dose as follows-   Antihyperglycemics (From admission, onward)    Start     Stop Route Frequency Ordered    10/06/23 2133  insulin aspart U-100 pen 0-5 Units         -- SubQ Before meals & nightly PRN 10/06/23 2033        Hold Oral hypoglycemics while patient is in the hospital.

## 2023-10-07 NOTE — PROGRESS NOTES
[facility] Medicine  Progress Note    Patient Name: Audrey Natarajan  MRN: [id number]  Patient Class: IP- Inpatient   Admission Date: 10/6/2023  Length of Stay: 1 days  Attending Physician: Jarocho Weldon MD  Primary Care Provider: Donaldo Pena MD        Subjective:     Principal Problem:Preseptal cellulitis        HPI:  This is a 53-year-old female with a past medical history of COPD, type 2 diabetes, hypertension, hyperlipidemia, PAD, bipolar disorder, VTE (on Eliquis), tobacco use, s/p left BKA who presents with eye swelling.      Patient presents with left eye swelling that was noted a day prior to presentation.  She initially had an eczematous rash on her scalp that started 4 days prior.  It was pruritic and painful like someone tugging on her hair.  She reports scratching at it and that it was progressively getting worse.  She later developed headache and right-sided glandular swelling.  On the night prior to presentation, she noted developing left eye swelling with some mild pressure.  She denied having limitation of eye movements, pain with eye movements, decreased vision or double vision. She recently finished a steroid course on 10/3.    In the ED, the patient was hemodynamically stable.  Per ED provider, she had normal intra-ocular pressures.  Labs remarkable for leukocytosis (15.7), elevated lactic acid (6.5 > 4.5), elevated anion gap (17), hyperglycemia (370).  CT head/orbit showed left periorbital soft tissue swelling and edema (periorbital/preseptal cellulitis in the right clinical setting with no intraorbital extension or other acute orbital abnormalities identified).  She was given 1 L of LR, vancomycin, ceftriaxone, Protonix 40 mg IV, Zofran 4 mg IV, and Toradol 10 mg IV.  Patient was admitted for further management.      Overview/Hospital Course:  Patient admitted with preseptal cellulitis of the left eye.  Started on vancomycin and ceftriaxone.  Id consult id given  proximity to eye, patient has ulcers, suspicious for MRSA per ID.  Continuing vanc plus ceftriaxone for now.  Patient's glucoses have been in the 400s, patient is not on insulin at home.  Unclear if this is a sudden rise in glucose due to infection, or if she has been unknowingly having these high glucoses at home which has resulted in infection.  Will start insulin.      Interval History:  NAEON.  No new issues.   Denies complaints.  All questions answered and updates on care given.       ROS:  General: Negative for fevers or chills.  Cardiac: Negative for chest pain or orthopnea   Pulmonary: Negative for dyspnea or wheezing.  GI: Negative for abdominal distention or pain     Vitals:    10/07/23 0809 10/07/23 0905 10/07/23 1027 10/07/23 1139   BP: (!) 106/53   131/62   BP Location: Left arm   Left arm   Patient Position: Lying   Lying   Pulse: 81 89  86   Resp: 16 16  16   Temp: 97.9 °F (36.6 °C)  97.9 °F (36.6 °C) 98.4 °F (36.9 °C)   TempSrc: Oral   Oral   SpO2: (!) 93% (!) 93%  (!) 93%   Weight:       Height:              Body mass index is 41.13 kg/m².      PHYSICAL EXAM:  GENERAL APPEARANCE: alert and cooperative, and appears to be in no acute distress.  HEENT:     HEAD: NC/AT     EYES: PERRL, EOMI.  Vision is grossly intact.  NECK: Neck supple, non-tender without LAD, masses or thyromegaly.  CARDIAC: There is no peripheral edema, cyanosis or pallor.   LUNGS: Clear to auscultation and percussion without rales or wheezing  ABDOMEN: Non-distended. No guarding.  MSK: No joint erythema or tenderness.   EXTREMITIES: No significant deformity or joint abnormality. No edema.   NEUROLOGICAL: CN II-XII grossly intact.   SKIN: Skin normal color, texture and turgor with no lesions or eruptions.  PSYCHIATRIC: Oriented. No tangential speech. No Hyperactive features.        Recent Results (from the past 24 hour(s))   POCT glucose    Collection Time: 10/06/23  3:06 PM   Result Value Ref Range    POCT Glucose 349 (H) 70 - 110  mg/dL   CBC auto differential    Collection Time: 10/06/23  4:27 PM   Result Value Ref Range    WBC 15.77 (H) 3.90 - 12.70 K/uL    RBC 4.97 4.00 - 5.40 M/uL    Hemoglobin 10.9 (L) 12.0 - 16.0 g/dL    Hematocrit 36.8 (L) 37.0 - 48.5 %    MCV 74 (L) 82 - 98 fL    MCH 21.9 (L) 27.0 - 31.0 pg    MCHC 29.6 (L) 32.0 - 36.0 g/dL    RDW 26.0 (H) 11.5 - 14.5 %    Platelets 465 (H) 150 - 450 K/uL    MPV 10.4 9.2 - 12.9 fL    Immature Granulocytes 1.0 (H) 0.0 - 0.5 %    Gran # (ANC) 8.0 (H) 1.8 - 7.7 K/uL    Immature Grans (Abs) 0.15 (H) 0.00 - 0.04 K/uL    Lymph # 5.4 (H) 1.0 - 4.8 K/uL    Mono # 1.9 (H) 0.3 - 1.0 K/uL    Eos # 0.3 0.0 - 0.5 K/uL    Baso # 0.11 0.00 - 0.20 K/uL    nRBC 0 0 /100 WBC    Gran % 50.3 38.0 - 73.0 %    Lymph % 34.2 18.0 - 48.0 %    Mono % 12.0 4.0 - 15.0 %    Eosinophil % 1.8 0.0 - 8.0 %    Basophil % 0.7 0.0 - 1.9 %    Differential Method Automated    Comprehensive metabolic panel    Collection Time: 10/06/23  4:27 PM   Result Value Ref Range    Sodium 136 136 - 145 mmol/L    Potassium 4.3 3.5 - 5.1 mmol/L    Chloride 97 95 - 110 mmol/L    CO2 22 (L) 23 - 29 mmol/L    Glucose 370 (H) 70 - 110 mg/dL    BUN 6 6 - 20 mg/dL    Creatinine 0.8 0.5 - 1.4 mg/dL    Calcium 8.4 (L) 8.7 - 10.5 mg/dL    Total Protein 6.5 6.0 - 8.4 g/dL    Albumin 2.9 (L) 3.5 - 5.2 g/dL    Total Bilirubin 0.1 0.1 - 1.0 mg/dL    Alkaline Phosphatase 85 55 - 135 U/L    AST 15 10 - 40 U/L    ALT 12 10 - 44 U/L    eGFR >60 >60 mL/min/1.73 m^2    Anion Gap 17 (H) 8 - 16 mmol/L   Lactic acid, plasma    Collection Time: 10/06/23  4:27 PM   Result Value Ref Range    Lactate (Lactic Acid) 6.5 (HH) 0.5 - 2.2 mmol/L   Blood culture #2 **CANNOT BE ORDERED STAT**    Collection Time: 10/06/23  4:28 PM    Specimen: Peripheral, Antecubital, Right; Blood   Result Value Ref Range    Blood Culture, Routine No Growth to date    Blood culture #1 **CANNOT BE ORDERED STAT**    Collection Time: 10/06/23  4:38 PM    Specimen: Peripheral, Hand, Right;  Blood   Result Value Ref Range    Blood Culture, Routine No Growth to date    POCT Influenza A/B Molecular    Collection Time: 10/06/23  5:28 PM   Result Value Ref Range    POC Molecular Influenza A Ag Negative Negative, Not Reported    POC Molecular Influenza B Ag Negative Negative, Not Reported     Acceptable Yes    POCT COVID-19 Rapid Screening    Collection Time: 10/06/23  5:28 PM   Result Value Ref Range    POC Rapid COVID Negative Negative     Acceptable Yes    Urinalysis, Reflex to Urine Culture Urine, Clean Catch    Collection Time: 10/06/23  5:32 PM    Specimen: Urine   Result Value Ref Range    Specimen UA Urine, Clean Catch     Color, UA Yellow Yellow, Straw, Tanya    Appearance, UA Clear Clear    pH, UA 6.0 5.0 - 8.0    Specific Gravity, UA 1.025 1.005 - 1.030    Protein, UA Negative Negative    Glucose, UA 4+ (A) Negative    Ketones, UA Trace (A) Negative    Bilirubin (UA) Negative Negative    Occult Blood UA Negative Negative    Nitrite, UA Negative Negative    Urobilinogen, UA Negative <2.0 EU/dL    Leukocytes, UA 3+ (A) Negative   Urinalysis Microscopic    Collection Time: 10/06/23  5:32 PM   Result Value Ref Range    RBC, UA 6 (H) 0 - 4 /hpf    WBC, UA 9 (H) 0 - 5 /hpf    Bacteria None None-Occ /hpf    Yeast, UA None None    Squam Epithel, UA 2 /hpf    Microscopic Comment SEE COMMENT    Procalcitonin    Collection Time: 10/06/23  5:43 PM   Result Value Ref Range    Procalcitonin 0.09 <0.25 ng/mL   Beta - Hydroxybutyrate, Serum    Collection Time: 10/06/23  5:43 PM   Result Value Ref Range    Beta-Hydroxybutyrate 0.2 0.0 - 0.5 mmol/L   ISTAT PROCEDURE    Collection Time: 10/06/23  5:44 PM   Result Value Ref Range    POC PH 7.396 7.35 - 7.45    POC PCO2 53.1 (H) 35 - 45 mmHg    POC PO2 34 (LL) 40 - 60 mmHg    POC HCO3 32.6 (H) 24 - 28 mmol/L    POC BE 6 -2 to 2 mmol/L    POC SATURATED O2 64 95 - 100 %    POC TCO2 34 (H) 24 - 29 mmol/L    Sample VENOUS     Site Other      Allens Test N/A    ISTAT Lactate    Collection Time: 10/06/23  5:45 PM   Result Value Ref Range    POC Lactate 4.51 (HH) 0.5 - 2.2 mmol/L    Sample VENOUS     Site Other     Allens Test N/A    POCT glucose    Collection Time: 10/06/23  8:50 PM   Result Value Ref Range    POCT Glucose 381 (H) 70 - 110 mg/dL   POCT glucose    Collection Time: 10/06/23  9:16 PM   Result Value Ref Range    POCT Glucose 394 (H) 70 - 110 mg/dL   CBC auto differential    Collection Time: 10/06/23 11:49 PM   Result Value Ref Range    WBC 13.32 (H) 3.90 - 12.70 K/uL    RBC 4.46 4.00 - 5.40 M/uL    Hemoglobin 9.9 (L) 12.0 - 16.0 g/dL    Hematocrit 32.8 (L) 37.0 - 48.5 %    MCV 74 (L) 82 - 98 fL    MCH 22.2 (L) 27.0 - 31.0 pg    MCHC 30.2 (L) 32.0 - 36.0 g/dL    RDW 25.5 (H) 11.5 - 14.5 %    Platelets 417 150 - 450 K/uL    MPV 9.8 9.2 - 12.9 fL    Immature Granulocytes 0.8 (H) 0.0 - 0.5 %    Gran # (ANC) 5.5 1.8 - 7.7 K/uL    Immature Grans (Abs) 0.10 (H) 0.00 - 0.04 K/uL    Lymph # 5.4 (H) 1.0 - 4.8 K/uL    Mono # 1.8 (H) 0.3 - 1.0 K/uL    Eos # 0.4 0.0 - 0.5 K/uL    Baso # 0.09 0.00 - 0.20 K/uL    nRBC 0 0 /100 WBC    Gran % 40.9 38.0 - 73.0 %    Lymph % 40.8 18.0 - 48.0 %    Mono % 13.5 4.0 - 15.0 %    Eosinophil % 3.3 0.0 - 8.0 %    Basophil % 0.7 0.0 - 1.9 %    Differential Method Automated    Lactic acid, plasma    Collection Time: 10/06/23 11:49 PM   Result Value Ref Range    Lactate (Lactic Acid) 4.0 (HH) 0.5 - 2.2 mmol/L   Hepatic Function Panel    Collection Time: 10/07/23  5:12 AM   Result Value Ref Range    Total Protein 5.9 (L) 6.0 - 8.4 g/dL    Albumin 2.7 (L) 3.5 - 5.2 g/dL    Total Bilirubin 0.2 0.1 - 1.0 mg/dL    Bilirubin, Direct 0.1 0.1 - 0.3 mg/dL    AST 9 (L) 10 - 40 U/L    ALT 9 (L) 10 - 44 U/L    Alkaline Phosphatase 82 55 - 135 U/L   Protime-INR    Collection Time: 10/07/23  5:12 AM   Result Value Ref Range    Prothrombin Time 10.2 9.0 - 12.5 sec    INR 1.0 0.8 - 1.2   Phosphorus    Collection Time: 10/07/23  5:12 AM    Result Value Ref Range    Phosphorus 4.4 2.7 - 4.5 mg/dL   Magnesium    Collection Time: 10/07/23  5:12 AM   Result Value Ref Range    Magnesium 1.3 (L) 1.6 - 2.6 mg/dL   Basic Metabolic Panel    Collection Time: 10/07/23  5:12 AM   Result Value Ref Range    Sodium 135 (L) 136 - 145 mmol/L    Potassium 3.6 3.5 - 5.1 mmol/L    Chloride 95 95 - 110 mmol/L    CO2 27 23 - 29 mmol/L    Glucose 358 (H) 70 - 110 mg/dL    BUN 11 6 - 20 mg/dL    Creatinine 0.8 0.5 - 1.4 mg/dL    Calcium 8.5 (L) 8.7 - 10.5 mg/dL    Anion Gap 13 8 - 16 mmol/L    eGFR >60 >60 mL/min/1.73 m^2   CBC Auto Differential    Collection Time: 10/07/23  5:12 AM   Result Value Ref Range    WBC 16.40 (H) 3.90 - 12.70 K/uL    RBC 4.98 4.00 - 5.40 M/uL    Hemoglobin 10.6 (L) 12.0 - 16.0 g/dL    Hematocrit 37.7 37.0 - 48.5 %    MCV 76 (L) 82 - 98 fL    MCH 21.3 (L) 27.0 - 31.0 pg    MCHC 28.1 (L) 32.0 - 36.0 g/dL    RDW 25.7 (H) 11.5 - 14.5 %    Platelets 465 (H) 150 - 450 K/uL    MPV 9.8 9.2 - 12.9 fL    Immature Granulocytes 0.7 (H) 0.0 - 0.5 %    Gran # (ANC) 8.2 (H) 1.8 - 7.7 K/uL    Immature Grans (Abs) 0.12 (H) 0.00 - 0.04 K/uL    Lymph # 5.1 (H) 1.0 - 4.8 K/uL    Mono # 2.3 (H) 0.3 - 1.0 K/uL    Eos # 0.5 0.0 - 0.5 K/uL    Baso # 0.08 0.00 - 0.20 K/uL    nRBC 0 0 /100 WBC    Gran % 50.2 38.0 - 73.0 %    Lymph % 31.3 18.0 - 48.0 %    Mono % 14.0 4.0 - 15.0 %    Eosinophil % 3.3 0.0 - 8.0 %    Basophil % 0.5 0.0 - 1.9 %    Differential Method Automated    Lactic acid, plasma    Collection Time: 10/07/23  5:12 AM   Result Value Ref Range    Lactate (Lactic Acid) 2.6 (H) 0.5 - 2.2 mmol/L   POCT glucose    Collection Time: 10/07/23  8:08 AM   Result Value Ref Range    POCT Glucose 387 (H) 70 - 110 mg/dL   POCT glucose    Collection Time: 10/07/23 11:39 AM   Result Value Ref Range    POCT Glucose 415 (H) 70 - 110 mg/dL   POCT glucose    Collection Time: 10/07/23 11:39 AM   Result Value Ref Range    POCT Glucose 438 (H) 70 - 110 mg/dL   POCT glucose     Collection Time: 10/07/23 11:43 AM   Result Value Ref Range    POCT Glucose 406 (H) 70 - 110 mg/dL       Microbiology Results (last 7 days)     Procedure Component Value Units Date/Time    Nasal culture [[id number]]     Order Status: No result Specimen: Nasal Swab from Nose     Blood culture #1 **CANNOT BE ORDERED STAT** [[id number]] Collected: 10/06/23 1638    Order Status: Completed Specimen: Blood from Peripheral, Hand, Right Updated: 10/06/23 2312     Blood Culture, Routine No Growth to date    Blood culture #2 **CANNOT BE ORDERED STAT** [[id number]] Collected: 10/06/23 1628    Order Status: Completed Specimen: Blood from Peripheral, Antecubital, Right Updated: 10/06/23 2312     Blood Culture, Routine No Growth to date           Imaging Results          CT ORBITS WITH CONTRAST (Final result)  Result time 10/06/23 18:43:09    Final result by Shalom Bro MD (10/06/23 18:43:09)                 Impression:      No acute intracranial abnormalities identified.    Left periorbital soft tissue swelling and edema.  This may be seen with periorbital/preseptal cellulitis in the right clinical setting.  No intraorbital extension or other acute orbital abnormalities identified.      Electronically signed by: Shalom Bro MD  Date:    10/06/2023  Time:    18:43             Narrative:    EXAMINATION:  CT HEAD WITH AND WITHOUT; CT ORBITS WITH CONTRAST    CLINICAL HISTORY:  infection, facial cellultis;; Orbital cellulitis suspected;    TECHNIQUE:  Low dose axial images were obtained through the head before and after administration of 65 cc Omnipaque 350 IV contrast.  Coronal and sagittal reformations were also performed.  Separate acquisition dedicated CT of the orbits was also obtained with sagittal and coronal reformats.    COMPARISON:  CT head from April 2022.    FINDINGS:  No evidence of acute/recent major vascular distribution cerebral infarction, intraparenchymal hemorrhage, or intra-axial space occupying lesion.   No obvious abnormal postcontrast parenchymal enhancement or enhancing lesion seen.  The ventricular system is normal in size and configuration with no evidence of hydrocephalus. No effacement of the skull-base cisterns. No abnormal extra-axial fluid collections or blood products.  Bilateral maxillary sinus disease with mucous membrane thickening is seen.  There is additional mild patchy ethmoid sinus disease visualized.  Remaining visualized paranasal sinuses and mastoid air cells are essentially clear.  The calvarium shows no significant abnormality.    There is left periorbital soft tissue swelling and edema.  Globes appear symmetric and intact.  No organized focal fluid collection or rim enhancing abscess seen.  No evidence of postseptal or intraorbital extension.  Orbits are otherwise normal in appearance with no acute abnormalities seen.  No acute facial fracture or orbital fracture seen.                               CT Head W Wo Contrast (Final result)  Result time 10/06/23 18:43:09    Final result by Shalom Bro MD (10/06/23 18:43:09)                 Impression:      No acute intracranial abnormalities identified.    Left periorbital soft tissue swelling and edema.  This may be seen with periorbital/preseptal cellulitis in the right clinical setting.  No intraorbital extension or other acute orbital abnormalities identified.      Electronically signed by: Shalom Bro MD  Date:    10/06/2023  Time:    18:43             Narrative:    EXAMINATION:  CT HEAD WITH AND WITHOUT; CT ORBITS WITH CONTRAST    CLINICAL HISTORY:  infection, facial cellultis;; Orbital cellulitis suspected;    TECHNIQUE:  Low dose axial images were obtained through the head before and after administration of 65 cc Omnipaque 350 IV contrast.  Coronal and sagittal reformations were also performed.  Separate acquisition dedicated CT of the orbits was also obtained with sagittal and coronal reformats.    COMPARISON:  CT head from  April 2022.    FINDINGS:  No evidence of acute/recent major vascular distribution cerebral infarction, intraparenchymal hemorrhage, or intra-axial space occupying lesion.  No obvious abnormal postcontrast parenchymal enhancement or enhancing lesion seen.  The ventricular system is normal in size and configuration with no evidence of hydrocephalus. No effacement of the skull-base cisterns. No abnormal extra-axial fluid collections or blood products.  Bilateral maxillary sinus disease with mucous membrane thickening is seen.  There is additional mild patchy ethmoid sinus disease visualized.  Remaining visualized paranasal sinuses and mastoid air cells are essentially clear.  The calvarium shows no significant abnormality.    There is left periorbital soft tissue swelling and edema.  Globes appear symmetric and intact.  No organized focal fluid collection or rim enhancing abscess seen.  No evidence of postseptal or intraorbital extension.  Orbits are otherwise normal in appearance with no acute abnormalities seen.  No acute facial fracture or orbital fracture seen.                                       Assessment/Plan:      * Preseptal cellulitis  CT head/orbit showed left periorbital soft tissue swelling and edema (periorbital/preseptal cellulitis in the right clinical setting with no intraorbital extension or other acute orbital abnormalities identified).   Doubt orbital cellulitis   Continue ceftriaxone/vancomycin   F/U cultures   ID consulted given near eye involvement    Type 2 diabetes mellitus  Patient's FSGs are controlled on current medication regimen.  Last A1c reviewed-   Lab Results   Component Value Date    HGBA1C 7.4 (H) 03/15/2023     Most recent fingerstick glucose reviewed-   Recent Labs   Lab 10/07/23  0808 10/07/23  1139 10/07/23  1139 10/07/23  1143   POCTGLUCOSE 387* 415* 438* 406*     Current correctional scale  Low  Maintain anti-hyperglycemic dose as follows-   Antihyperglycemics (From  admission, onward)    Start     Stop Route Frequency Ordered    10/07/23 1230  insulin detemir U-100 (Levemir) pen 18 Units         -- SubQ 2 times daily 10/07/23 1218    10/07/23 0806  insulin aspart U-100 pen 0-10 Units         -- SubQ Before meals & nightly PRN 10/07/23 0707        Hold Oral hypoglycemics while patient is in the hospital.  Glucose going into the 400s, will start insulin  A1c's have been great outpt.. unclear if this 2/2 infection or infection is due to suddenly poorly controlled glucose    Elevated lactic acid level  Likely in the setting of infection/metformin use. Monitor     PAD (peripheral artery disease)  No acute issues. Resume home medications       Chronic deep vein thrombosis (DVT) of both lower extremities  Resume Eliquis       SHAWN (obstructive sleep apnea)  Not on CPAP   Outpatient follow up with sleep medicine       History of pulmonary embolism  Resume Eliquis      Bipolar 1 disorder  Resume home medications       Tobacco abuse  Nicotine patch PRN    Hyperlipidemia  Resume home medications       COPD (chronic obstructive pulmonary disease)  No acute issues. PRN Duo-Nebs    Essential hypertension  Resume home medications         VTE Risk Mitigation (From admission, onward)         Ordered     apixaban tablet 5 mg  2 times daily         10/06/23 2033     IP VTE HIGH RISK PATIENT  Once         10/06/23 2033     Place sequential compression device  Until discontinued         10/06/23 2033                Discharge Planning   HUMBERTO:      Code Status: Full Code   Is the patient medically ready for discharge?:     Reason for patient still in hospital (select all that apply): Patient trending condition and Treatment                     Jarocho Weldon MD  Department of Hospital Medicine   [facility]

## 2023-10-07 NOTE — ASSESSMENT & PLAN NOTE
54 yo woman, admitted with preseptal cellulitis, OS  - workup initiated in ED  - empiric abx (CTX and vancomycin)  - given healing skin ulcers, clinically like MRSA  - culture nares  - continue CTX and vancomycin  - trend WBC and clinical response to emipric therapy  - d/w Nursing and Pharmacy

## 2023-10-08 LAB
ALBUMIN SERPL BCP-MCNC: 2.7 G/DL (ref 3.5–5.2)
ALP SERPL-CCNC: 85 U/L (ref 55–135)
ALT SERPL W/O P-5'-P-CCNC: 10 U/L (ref 10–44)
ANION GAP SERPL CALC-SCNC: 10 MMOL/L (ref 8–16)
AST SERPL-CCNC: 11 U/L (ref 10–40)
BASOPHILS # BLD AUTO: 0.11 K/UL (ref 0–0.2)
BASOPHILS NFR BLD: 0.8 % (ref 0–1.9)
BILIRUB SERPL-MCNC: 0.2 MG/DL (ref 0.1–1)
BUN SERPL-MCNC: 9 MG/DL (ref 6–20)
CALCIUM SERPL-MCNC: 8.9 MG/DL (ref 8.7–10.5)
CHLORIDE SERPL-SCNC: 100 MMOL/L (ref 95–110)
CO2 SERPL-SCNC: 29 MMOL/L (ref 23–29)
CREAT SERPL-MCNC: 0.7 MG/DL (ref 0.5–1.4)
DIFFERENTIAL METHOD: ABNORMAL
EOSINOPHIL # BLD AUTO: 0.6 K/UL (ref 0–0.5)
EOSINOPHIL NFR BLD: 3.9 % (ref 0–8)
ERYTHROCYTE [DISTWIDTH] IN BLOOD BY AUTOMATED COUNT: 25.9 % (ref 11.5–14.5)
EST. GFR  (NO RACE VARIABLE): >60 ML/MIN/1.73 M^2
FERRITIN SERPL-MCNC: 32 NG/ML (ref 20–300)
GLUCOSE SERPL-MCNC: 283 MG/DL (ref 70–110)
HCT VFR BLD AUTO: 36.7 % (ref 37–48.5)
HGB BLD-MCNC: 10.8 G/DL (ref 12–16)
IMM GRANULOCYTES # BLD AUTO: 0.12 K/UL (ref 0–0.04)
IMM GRANULOCYTES NFR BLD AUTO: 0.8 % (ref 0–0.5)
IRON SERPL-MCNC: 18 UG/DL (ref 30–160)
LYMPHOCYTES # BLD AUTO: 4.6 K/UL (ref 1–4.8)
LYMPHOCYTES NFR BLD: 31.4 % (ref 18–48)
MAGNESIUM SERPL-MCNC: 1.7 MG/DL (ref 1.6–2.6)
MCH RBC QN AUTO: 22 PG (ref 27–31)
MCHC RBC AUTO-ENTMCNC: 29.4 G/DL (ref 32–36)
MCV RBC AUTO: 75 FL (ref 82–98)
MONOCYTES # BLD AUTO: 1.8 K/UL (ref 0.3–1)
MONOCYTES NFR BLD: 12.6 % (ref 4–15)
NEUTROPHILS # BLD AUTO: 7.4 K/UL (ref 1.8–7.7)
NEUTROPHILS NFR BLD: 50.5 % (ref 38–73)
NRBC BLD-RTO: 0 /100 WBC
PHOSPHATE SERPL-MCNC: 4.5 MG/DL (ref 2.7–4.5)
PLATELET # BLD AUTO: 428 K/UL (ref 150–450)
PMV BLD AUTO: 9.8 FL (ref 9.2–12.9)
POCT GLUCOSE: 263 MG/DL (ref 70–110)
POCT GLUCOSE: 286 MG/DL (ref 70–110)
POCT GLUCOSE: 360 MG/DL (ref 70–110)
POCT GLUCOSE: 385 MG/DL (ref 70–110)
POTASSIUM SERPL-SCNC: 3.8 MMOL/L (ref 3.5–5.1)
PROT SERPL-MCNC: 6 G/DL (ref 6–8.4)
RBC # BLD AUTO: 4.92 M/UL (ref 4–5.4)
SATURATED IRON: 4 % (ref 20–50)
SODIUM SERPL-SCNC: 139 MMOL/L (ref 136–145)
TOTAL IRON BINDING CAPACITY: 411 UG/DL (ref 250–450)
TRANSFERRIN SERPL-MCNC: 278 MG/DL (ref 200–375)
VANCOMYCIN TROUGH SERPL-MCNC: 13.9 UG/ML (ref 10–22)
WBC # BLD AUTO: 14.54 K/UL (ref 3.9–12.7)

## 2023-10-08 PROCEDURE — 63600175 PHARM REV CODE 636 W HCPCS: Performed by: STUDENT IN AN ORGANIZED HEALTH CARE EDUCATION/TRAINING PROGRAM

## 2023-10-08 PROCEDURE — 25000003 PHARM REV CODE 250: Performed by: STUDENT IN AN ORGANIZED HEALTH CARE EDUCATION/TRAINING PROGRAM

## 2023-10-08 PROCEDURE — 84100 ASSAY OF PHOSPHORUS: CPT | Performed by: STUDENT IN AN ORGANIZED HEALTH CARE EDUCATION/TRAINING PROGRAM

## 2023-10-08 PROCEDURE — 82728 ASSAY OF FERRITIN: CPT | Performed by: STUDENT IN AN ORGANIZED HEALTH CARE EDUCATION/TRAINING PROGRAM

## 2023-10-08 PROCEDURE — 94640 AIRWAY INHALATION TREATMENT: CPT

## 2023-10-08 PROCEDURE — 25000242 PHARM REV CODE 250 ALT 637 W/ HCPCS: Performed by: STUDENT IN AN ORGANIZED HEALTH CARE EDUCATION/TRAINING PROGRAM

## 2023-10-08 PROCEDURE — 94761 N-INVAS EAR/PLS OXIMETRY MLT: CPT

## 2023-10-08 PROCEDURE — 27000221 HC OXYGEN, UP TO 24 HOURS

## 2023-10-08 PROCEDURE — 85025 COMPLETE CBC W/AUTO DIFF WBC: CPT | Performed by: STUDENT IN AN ORGANIZED HEALTH CARE EDUCATION/TRAINING PROGRAM

## 2023-10-08 PROCEDURE — 80202 ASSAY OF VANCOMYCIN: CPT | Performed by: STUDENT IN AN ORGANIZED HEALTH CARE EDUCATION/TRAINING PROGRAM

## 2023-10-08 PROCEDURE — 80053 COMPREHEN METABOLIC PANEL: CPT | Performed by: STUDENT IN AN ORGANIZED HEALTH CARE EDUCATION/TRAINING PROGRAM

## 2023-10-08 PROCEDURE — 99233 PR SUBSEQUENT HOSPITAL CARE,LEVL III: ICD-10-PCS | Mod: ,,, | Performed by: INTERNAL MEDICINE

## 2023-10-08 PROCEDURE — 83735 ASSAY OF MAGNESIUM: CPT | Performed by: STUDENT IN AN ORGANIZED HEALTH CARE EDUCATION/TRAINING PROGRAM

## 2023-10-08 PROCEDURE — 36415 COLL VENOUS BLD VENIPUNCTURE: CPT | Performed by: STUDENT IN AN ORGANIZED HEALTH CARE EDUCATION/TRAINING PROGRAM

## 2023-10-08 PROCEDURE — 84466 ASSAY OF TRANSFERRIN: CPT | Performed by: STUDENT IN AN ORGANIZED HEALTH CARE EDUCATION/TRAINING PROGRAM

## 2023-10-08 PROCEDURE — 11000001 HC ACUTE MED/SURG PRIVATE ROOM

## 2023-10-08 PROCEDURE — 99233 SBSQ HOSP IP/OBS HIGH 50: CPT | Mod: ,,, | Performed by: INTERNAL MEDICINE

## 2023-10-08 RX ORDER — KETOROLAC TROMETHAMINE 30 MG/ML
30 INJECTION, SOLUTION INTRAMUSCULAR; INTRAVENOUS EVERY 8 HOURS
Status: COMPLETED | OUTPATIENT
Start: 2023-10-08 | End: 2023-10-10

## 2023-10-08 RX ORDER — LANOLIN ALCOHOL/MO/W.PET/CERES
1 CREAM (GRAM) TOPICAL DAILY
Status: DISCONTINUED | OUTPATIENT
Start: 2023-10-10 | End: 2023-10-11 | Stop reason: HOSPADM

## 2023-10-08 RX ORDER — INSULIN ASPART 100 [IU]/ML
12 INJECTION, SOLUTION INTRAVENOUS; SUBCUTANEOUS
Status: DISCONTINUED | OUTPATIENT
Start: 2023-10-08 | End: 2023-10-09

## 2023-10-08 RX ADMIN — ONDANSETRON 4 MG: 2 INJECTION INTRAMUSCULAR; INTRAVENOUS at 08:10

## 2023-10-08 RX ADMIN — APIXABAN 5 MG: 5 TABLET, FILM COATED ORAL at 08:10

## 2023-10-08 RX ADMIN — GABAPENTIN 600 MG: 300 CAPSULE ORAL at 08:10

## 2023-10-08 RX ADMIN — PRAVASTATIN SODIUM 40 MG: 40 TABLET ORAL at 08:10

## 2023-10-08 RX ADMIN — CEFTRIAXONE 2 G: 2 INJECTION, POWDER, FOR SOLUTION INTRAMUSCULAR; INTRAVENOUS at 04:10

## 2023-10-08 RX ADMIN — BUDESONIDE 0.5 MG: 0.5 INHALANT RESPIRATORY (INHALATION) at 07:10

## 2023-10-08 RX ADMIN — PANTOPRAZOLE SODIUM 40 MG: 40 TABLET, DELAYED RELEASE ORAL at 08:10

## 2023-10-08 RX ADMIN — INSULIN ASPART 12 UNITS: 100 INJECTION, SOLUTION INTRAVENOUS; SUBCUTANEOUS at 05:10

## 2023-10-08 RX ADMIN — BUMETANIDE 1 MG: 1 TABLET ORAL at 08:10

## 2023-10-08 RX ADMIN — FERROUS SULFATE TAB 325 MG (65 MG ELEMENTAL FE) 1 EACH: 325 (65 FE) TAB at 08:10

## 2023-10-08 RX ADMIN — INSULIN ASPART 6 UNITS: 100 INJECTION, SOLUTION INTRAVENOUS; SUBCUTANEOUS at 08:10

## 2023-10-08 RX ADMIN — INSULIN ASPART 12 UNITS: 100 INJECTION, SOLUTION INTRAVENOUS; SUBCUTANEOUS at 08:10

## 2023-10-08 RX ADMIN — ACETAMINOPHEN 1000 MG: 500 TABLET, FILM COATED ORAL at 05:10

## 2023-10-08 RX ADMIN — ACETAMINOPHEN 1000 MG: 500 TABLET, FILM COATED ORAL at 09:10

## 2023-10-08 RX ADMIN — SODIUM CHLORIDE 125 MG: 9 INJECTION, SOLUTION INTRAVENOUS at 01:10

## 2023-10-08 RX ADMIN — FLUTICASONE PROPIONATE 100 MCG: 50 SPRAY, METERED NASAL at 08:10

## 2023-10-08 RX ADMIN — MUPIROCIN: 20 OINTMENT TOPICAL at 08:10

## 2023-10-08 RX ADMIN — ASPIRIN 81 MG CHEWABLE TABLET 81 MG: 81 TABLET CHEWABLE at 08:10

## 2023-10-08 RX ADMIN — VANCOMYCIN HYDROCHLORIDE 1500 MG: 1.5 INJECTION, POWDER, LYOPHILIZED, FOR SOLUTION INTRAVENOUS at 08:10

## 2023-10-08 RX ADMIN — RISPERIDONE 3 MG: 1 TABLET ORAL at 08:10

## 2023-10-08 RX ADMIN — TRAMADOL HYDROCHLORIDE 50 MG: 50 TABLET, COATED ORAL at 12:10

## 2023-10-08 RX ADMIN — INSULIN ASPART 6 UNITS: 100 INJECTION, SOLUTION INTRAVENOUS; SUBCUTANEOUS at 05:10

## 2023-10-08 RX ADMIN — GABAPENTIN 600 MG: 300 CAPSULE ORAL at 02:10

## 2023-10-08 RX ADMIN — DIVALPROEX SODIUM 500 MG: 250 TABLET, DELAYED RELEASE ORAL at 08:10

## 2023-10-08 RX ADMIN — TRAMADOL HYDROCHLORIDE 50 MG: 50 TABLET, COATED ORAL at 03:10

## 2023-10-08 RX ADMIN — ARFORMOTEROL TARTRATE 15 MCG: 15 SOLUTION RESPIRATORY (INHALATION) at 07:10

## 2023-10-08 RX ADMIN — METHOCARBAMOL 500 MG: 500 TABLET ORAL at 02:10

## 2023-10-08 RX ADMIN — INSULIN ASPART 10 UNITS: 100 INJECTION, SOLUTION INTRAVENOUS; SUBCUTANEOUS at 12:10

## 2023-10-08 RX ADMIN — INSULIN ASPART 12 UNITS: 100 INJECTION, SOLUTION INTRAVENOUS; SUBCUTANEOUS at 12:10

## 2023-10-08 RX ADMIN — KETOROLAC TROMETHAMINE 30 MG: 30 INJECTION, SOLUTION INTRAMUSCULAR; INTRAVENOUS at 02:10

## 2023-10-08 RX ADMIN — ACETAMINOPHEN 1000 MG: 500 TABLET, FILM COATED ORAL at 01:10

## 2023-10-08 RX ADMIN — KETOROLAC TROMETHAMINE 30 MG: 30 INJECTION, SOLUTION INTRAMUSCULAR; INTRAVENOUS at 09:10

## 2023-10-08 RX ADMIN — Medication 1000 UNITS: at 08:10

## 2023-10-08 RX ADMIN — METHOCARBAMOL 500 MG: 500 TABLET ORAL at 08:10

## 2023-10-08 RX ADMIN — VANCOMYCIN HYDROCHLORIDE 1500 MG: 1.5 INJECTION, POWDER, LYOPHILIZED, FOR SOLUTION INTRAVENOUS at 09:10

## 2023-10-08 NOTE — PLAN OF CARE
Problem: Adult Inpatient Plan of Care  Goal: Plan of Care Review  Outcome: Ongoing, Progressing  Flowsheets (Taken 10/8/2023 1727)  Plan of Care Reviewed With: patient  Goal: Patient-Specific Goal (Individualized)  Outcome: Ongoing, Progressing  Goal: Absence of Hospital-Acquired Illness or Injury  Outcome: Ongoing, Progressing  Intervention: Identify and Manage Fall Risk  Flowsheets (Taken 10/8/2023 1727)  Safety Promotion/Fall Prevention:   Fall Risk reviewed with patient/family   assistive device/personal item within reach   high risk medications identified   medications reviewed   nonskid shoes/socks when out of bed   side rails raised x 2   instructed to call staff for mobility   room near unit station  Intervention: Prevent Skin Injury  Flowsheets (Taken 10/8/2023 1727)  Body Position: position changed independently  Skin Protection:   adhesive use limited   tubing/devices free from skin contact  Intervention: Prevent and Manage VTE (Venous Thromboembolism) Risk  Flowsheets (Taken 10/8/2023 1727)  Activity Management:   Arm raise - L1   Ankle pumps - L1   Rolling - L1   Straight leg raise - L1  VTE Prevention/Management:   ambulation promoted   bleeding precations maintained   bleeding risk assessed  Range of Motion: active ROM (range of motion) encouraged  Intervention: Prevent Infection  Flowsheets (Taken 10/8/2023 1727)  Infection Prevention: hand hygiene promoted  Goal: Optimal Comfort and Wellbeing  Outcome: Ongoing, Progressing  Goal: Readiness for Transition of Care  Outcome: Ongoing, Progressing     Problem: Diabetes Comorbidity  Goal: Blood Glucose Level Within Targeted Range  Outcome: Ongoing, Progressing  Intervention: Monitor and Manage Glycemia  Flowsheets (Taken 10/8/2023 1727)  Glycemic Management:   blood glucose monitored   supplemental insulin given     Problem: Bariatric Environmental Safety  Goal: Safety Maintained with Care  Outcome: Ongoing, Progressing     Problem: Fall Injury  Risk  Goal: Absence of Fall and Fall-Related Injury  Outcome: Ongoing, Progressing  Intervention: Identify and Manage Contributors  Flowsheets (Taken 10/8/2023 1727)  Self-Care Promotion:   independence encouraged   safe use of adaptive equipment encouraged   BADL personal objects within reach   BADL personal routines maintained   meal set-up provided  Medication Review/Management:   medications reviewed   high-risk medications identified  Intervention: Promote Injury-Free Environment  Flowsheets (Taken 10/8/2023 1727)  Safety Promotion/Fall Prevention:   Fall Risk reviewed with patient/family   assistive device/personal item within reach   high risk medications identified   medications reviewed   nonskid shoes/socks when out of bed   side rails raised x 2   instructed to call staff for mobility   room near unit station     Problem: Skin or Soft Tissue Infection  Goal: Absence of Infection Signs and Symptoms  Outcome: Ongoing, Progressing  Intervention: Minimize and Manage Infection Progression  Flowsheets (Taken 10/8/2023 1727)  Infection Prevention: hand hygiene promoted  Infection Management: aseptic technique maintained   Pt alert able to make needs known,danelle meds well,IV abx remains in progress,no s/s adverse reaction noted,reposition self q 2hrs,pain controlled by prn pain medication,POC explained,remains free from falls and pressure injuries,safety maintained,continue monitoring.    independent independent

## 2023-10-08 NOTE — SUBJECTIVE & OBJECTIVE
Interval History: Feeling better but complains of headache. No fever or chills. Asking for Tramadol.    Review of Systems   Constitutional:  Negative for chills and fever.   HENT:  Positive for facial swelling. Negative for mouth sores, rhinorrhea and sinus pain.    All other systems reviewed and are negative.    Objective:     Vital Signs (Most Recent):  Temp: 97.9 °F (36.6 °C) (10/08/23 0749)  Pulse: 83 (10/08/23 0749)  Resp: 20 (10/08/23 0749)  BP: 135/63 (10/08/23 0749)  SpO2: 95 % (10/08/23 0749) Vital Signs (24h Range):  Temp:  [97.8 °F (36.6 °C)-98.4 °F (36.9 °C)] 97.9 °F (36.6 °C)  Pulse:  [83-93] 83  Resp:  [16-21] 20  SpO2:  [93 %-98 %] 95 %  BP: (126-143)/(60-69) 135/63     Weight: 115.6 kg (254 lb 13.6 oz)  Body mass index is 41.13 kg/m².    Estimated Creatinine Clearance: 122.8 mL/min (based on SCr of 0.7 mg/dL).     Physical Exam  Vitals and nursing note reviewed.   Constitutional:       General: She is not in acute distress.     Appearance: Normal appearance. She is not ill-appearing, toxic-appearing or diaphoretic.   HENT:      Head: Normocephalic and atraumatic.   Eyes:      Extraocular Movements: Extraocular movements intact.      Pupils: Pupils are equal, round, and reactive to light.   Cardiovascular:      Rate and Rhythm: Normal rate.   Skin:     Findings: Erythema present.      Comments: Face less edematous and angry   Neurological:      General: No focal deficit present.      Mental Status: She is alert.   Psychiatric:         Mood and Affect: Mood normal.         Behavior: Behavior normal.         Thought Content: Thought content normal.          Significant Labs: Blood Culture:   Recent Labs   Lab 05/15/23  2313 05/15/23  2321 10/06/23  1628 10/06/23  1638   LABBLOO No Growth after 4 days. No Growth after 4 days. No Growth to date  No Growth to date No Growth to date  No Growth to date     CBC:   Recent Labs   Lab 10/06/23  2349 10/07/23  0512 10/08/23  0800   WBC 13.32* 16.40* 14.54*  "  HGB 9.9* 10.6* 10.8*   HCT 32.8* 37.7 36.7*    465* 428     CMP:   Recent Labs   Lab 10/06/23  1627 10/07/23  0512 10/07/23  1232 10/08/23  0800    135*  --  139   K 4.3 3.6  --  3.8   CL 97 95  --  100   CO2 22* 27  --  29   * 358* 474* 283*   BUN 6 11  --  9   CREATININE 0.8 0.8  --  0.7   CALCIUM 8.4* 8.5*  --  8.9   PROT 6.5 5.9*  --  6.0   ALBUMIN 2.9* 2.7*  --  2.7*   BILITOT 0.1 0.2  --  0.2   ALKPHOS 85 82  --  85   AST 15 9*  --  11   ALT 12 9*  --  10   ANIONGAP 17* 13  --  10     Wound Culture: No results for input(s): "LABAERO" in the last 4320 hours.    Significant Imaging: I have reviewed all pertinent imaging results/findings within the past 24 hours.  "

## 2023-10-08 NOTE — NURSING
[facility] [place] -- 4 [place]      10/7/2023      Skin assessed on: Q Shift      [x] No Pressure Injuries Present    []Prevention Measures Documented    [] Yes LDA  for Pressure Injury Previously documented     [] Yes New Pressure Injury Discovered   [] LDA for New Pressure Injury Added      Attending RN:  Liliana Gupta RN     Second RN:  DANK Storm

## 2023-10-08 NOTE — PROGRESS NOTES
"[facility]  Infectious Disease  Progress Note    Patient Name: Audrey Natarajan  MRN: [id number]  Admission Date: 10/6/2023  Length of Stay: 2 days  Attending Physician: Jarocho Weldon MD  Primary Care Provider: Donaldo Pena MD    Isolation Status: No active isolations     Assessment/Plan:      * Preseptal cellulitis  54 yo woman, admitted with preseptal cellulitis, OS  - workup initiated in ED  - empiric abx (CTX and vancomycin)  - given healing skin ulcers, clinically like MRSA  - culture nares: pending  - WBC up a bit but patient look better, clinically;  - continue CTX and vancomycin  - trend WBC and clinical response to emipric therapy  - d/w Dr. Fatemeh Pereira MD  Infectious Disease  [facility]    Subjective:     Principal Problem:Preseptal cellulitis    HPI: 54 yo woman admitted with preseptal cellulitis of the left eye. The patient has a remote history of "boils" and recently developed URI symptoms and swelling of her right face. She tells me that she kept touching the area (she is an admitted "") and subsequently developed "sores" on her head and left forehead. When her eye started to swell, she came to the ED and was admitted. The patient underwent CT imaging which confirmed that diagnosis. She was started on empiric bx after blood cultures were obtained and ID is consulted.    Interval History: Feeling better but complains of headache. No fever or chills. Asking for Tramadol.    Review of Systems   Constitutional:  Negative for chills and fever.   HENT:  Positive for facial swelling. Negative for mouth sores, rhinorrhea and sinus pain.    All other systems reviewed and are negative.    Objective:     Vital Signs (Most Recent):  Temp: 97.9 °F (36.6 °C) (10/08/23 0749)  Pulse: 83 (10/08/23 0749)  Resp: 20 (10/08/23 0749)  BP: 135/63 (10/08/23 0749)  SpO2: 95 % (10/08/23 0749) Vital Signs (24h Range):  Temp:  [97.8 °F (36.6 °C)-98.4 °F (36.9 °C)] 97.9 °F (36.6 " "°C)  Pulse:  [83-93] 83  Resp:  [16-21] 20  SpO2:  [93 %-98 %] 95 %  BP: (126-143)/(60-69) 135/63     Weight: 115.6 kg (254 lb 13.6 oz)  Body mass index is 41.13 kg/m².    Estimated Creatinine Clearance: 122.8 mL/min (based on SCr of 0.7 mg/dL).     Physical Exam  Vitals and nursing note reviewed.   Constitutional:       General: She is not in acute distress.     Appearance: Normal appearance. She is not ill-appearing, toxic-appearing or diaphoretic.   HENT:      Head: Normocephalic and atraumatic.   Eyes:      Extraocular Movements: Extraocular movements intact.      Pupils: Pupils are equal, round, and reactive to light.   Cardiovascular:      Rate and Rhythm: Normal rate.   Skin:     Findings: Erythema present.      Comments: Face less edematous and angry   Neurological:      General: No focal deficit present.      Mental Status: She is alert.   Psychiatric:         Mood and Affect: Mood normal.         Behavior: Behavior normal.         Thought Content: Thought content normal.          Significant Labs: Blood Culture:   Recent Labs   Lab 05/15/23  2313 05/15/23  2321 10/06/23  1628 10/06/23  1638   LABBLOO No Growth after 4 days. No Growth after 4 days. No Growth to date  No Growth to date No Growth to date  No Growth to date     CBC:   Recent Labs   Lab 10/06/23  2349 10/07/23  0512 10/08/23  0800   WBC 13.32* 16.40* 14.54*   HGB 9.9* 10.6* 10.8*   HCT 32.8* 37.7 36.7*    465* 428     CMP:   Recent Labs   Lab 10/06/23  1627 10/07/23  0512 10/07/23  1232 10/08/23  0800    135*  --  139   K 4.3 3.6  --  3.8   CL 97 95  --  100   CO2 22* 27  --  29   * 358* 474* 283*   BUN 6 11  --  9   CREATININE 0.8 0.8  --  0.7   CALCIUM 8.4* 8.5*  --  8.9   PROT 6.5 5.9*  --  6.0   ALBUMIN 2.9* 2.7*  --  2.7*   BILITOT 0.1 0.2  --  0.2   ALKPHOS 85 82  --  85   AST 15 9*  --  11   ALT 12 9*  --  10   ANIONGAP 17* 13  --  10     Wound Culture: No results for input(s): "LABAERO" in the last 4320 " hours.    Significant Imaging: I have reviewed all pertinent imaging results/findings within the past 24 hours.

## 2023-10-08 NOTE — PLAN OF CARE
Problem: Adult Inpatient Plan of Care  Goal: Plan of Care Review  Outcome: Ongoing, Progressing  Flowsheets (Taken 10/8/2023 0150)  Plan of Care Reviewed With: patient    Patient remains free from injury and falls this shift. Swelling to around eyes noted. Patient complains of pain to head. No relief with Tramadol. One time dose of toradol given with some relief. BG elevated 361. Scheduled and sliding scale insulin administered as ordered. IVF infusing. IV antibiotics administered as ordered. Patient able to make needs known. Plan of care continued.

## 2023-10-08 NOTE — PROGRESS NOTES
Pharmacokinetic Assessment Follow Up: IV Vancomycin    Vancomycin serum concentration assessment(s):    The trough level was drawn correctly and can be used to guide therapy at this time. The measurement is within the desired definitive target range of 10 to 20 mcg/mL.    Vancomycin Regimen Plan:    Continue regimen to Vancomycin 1500 mg IV every 12 hours with next serum trough concentration measured at 0800 prior to 4th dose on 10/10    Drug levels (last 3 results):  Recent Labs   Lab Result Units 10/08/23  0800   Vancomycin-Trough ug/mL 13.9       Pharmacy will continue to follow and monitor vancomycin.    Please contact pharmacy at extension [phone] for questions regarding this assessment.    Thank you for the consult,   Gerson Jhaveri       Patient brief summary:  Audrey Natarajan is a 53 y.o. female initiated on antimicrobial therapy with IV Vancomycin for treatment of skin & soft tissue infection    The patient's current regimen is 1500 mg q12h    Drug Allergies:   Review of patient's allergies indicates:   Allergen Reactions    Morphine Other (See Comments)     Patient had a psychotic episode after taking Morphine  Agitation, hallucinations  Other Reaction(s): Other (See Comments), Other (See Comments)    Patient had a psychotic episode after taking Morphine    Patient had a psychotic episode after taking Morphine Agitation, hallucinations    Penicillins Anaphylaxis     Tolerated cephalosporins in the past    Januvia [sitagliptin] Hives    Carbamazepine Other (See Comments)     hyponatremia  Other Reaction(s): Other (See Comments)    hyponatremia       Actual Body Weight:   115 kg    Renal Function:   Estimated Creatinine Clearance: 122.8 mL/min (based on SCr of 0.7 mg/dL).,     Dialysis Method (if applicable):  N/A    CBC (last 72 hours):  Recent Labs   Lab Result Units 10/06/23  1627 10/06/23  2349 10/07/23  0512 10/08/23  0800   WBC K/uL 15.77* 13.32* 16.40* 14.54*   Hemoglobin g/dL 10.9* 9.9* 10.6*  10.8*   Hemoglobin A1C %  --   --  11.6*  --    Hematocrit % 36.8* 32.8* 37.7 36.7*   Platelets K/uL 465* 417 465* 428   Gran % % 50.3 40.9 50.2  --    Lymph % % 34.2 40.8 31.3  --    Mono % % 12.0 13.5 14.0  --    Eosinophil % % 1.8 3.3 3.3  --    Basophil % % 0.7 0.7 0.5  --    Differential Method  Automated Automated Automated  --        Metabolic Panel (last 72 hours):  Recent Labs   Lab Result Units 10/06/23  1627 10/06/23  1732 10/07/23  0512 10/07/23  1232 10/08/23  0800   Sodium mmol/L 136  --  135*  --  139   Potassium mmol/L 4.3  --  3.6  --  3.8   Chloride mmol/L 97  --  95  --  100   CO2 mmol/L 22*  --  27  --  29   Glucose mg/dL 370*  --  358* 474* 283*   Glucose, UA   --  4+*  --   --   --    BUN mg/dL 6  --  11  --  9   Creatinine mg/dL 0.8  --  0.8  --  0.7   Albumin g/dL 2.9*  --  2.7*  --  2.7*   Total Bilirubin mg/dL 0.1  --  0.2  --  0.2   Alkaline Phosphatase U/L 85  --  82  --  85   AST U/L 15  --  9*  --  11   ALT U/L 12  --  9*  --  10   Magnesium mg/dL  --   --  1.3*  --  1.7   Phosphorus mg/dL  --   --  4.4  --  4.5       Vancomycin Administrations:  vancomycin given in the last 96 hours                     vancomycin 1,500 mg in dextrose 5 % (D5W) 250 mL IVPB (Vial-Mate) (mg) 1,500 mg New Bag 10/08/23 0822     1,500 mg New Bag 10/07/23 2050      Restarted  0547     1,500 mg New Bag  0541    vancomycin (VANCOCIN) 2,250 mg in dextrose 5 % (D5W) 500 mL IVPB ()  Restarted 10/06/23 1823     2,250 mg New Bag  1753                    Microbiologic Results:  Microbiology Results (last 7 days)       Procedure Component Value Units Date/Time    Blood culture #2 **CANNOT BE ORDERED STAT** [[id number]] Collected: 10/06/23 1628    Order Status: Completed Specimen: Blood from Peripheral, Antecubital, Right Updated: 10/07/23 1703     Blood Culture, Routine No Growth to date      No Growth to date    Blood culture #1 **CANNOT BE ORDERED STAT** [[id number]] Collected: 10/06/23 1638    Order Status:  Completed Specimen: Blood from Peripheral, Hand, Right Updated: 10/07/23 1703     Blood Culture, Routine No Growth to date      No Growth to date    Nasal culture [[id number]] Collected: 10/07/23 1305    Order Status: Sent Specimen: Nasal Swab from Nose Updated: 10/07/23 6966

## 2023-10-08 NOTE — PROGRESS NOTES
[facility]  Progress Note    Patient Name: Audrey Natarajan  MRN: [id number]  Patient Class: IP- Inpatient   Admission Date: 10/6/2023  Length of Stay: 2 days  Attending Physician: Jarocho Weldon MD  Primary Care Provider: Donaldo Pena MD        Subjective:     Principal Problem:Preseptal cellulitis        HPI:  This is a 53-year-old female with a past medical history of COPD, type 2 diabetes, hypertension, hyperlipidemia, PAD, bipolar disorder, VTE (on Eliquis), tobacco use, s/p left BKA who presents with eye swelling.      Patient presents with left eye swelling that was noted a day prior to presentation.  She initially had an eczematous rash on her scalp that started 4 days prior.  It was pruritic and painful like someone tugging on her hair.  She reports scratching at it and that it was progressively getting worse.  She later developed headache and right-sided glandular swelling.  On the night prior to presentation, she noted developing left eye swelling with some mild pressure.  She denied having limitation of eye movements, pain with eye movements, decreased vision or double vision. She recently finished a steroid course on 10/3.    In the ED, the patient was hemodynamically stable.  Per ED provider, she had normal intra-ocular pressures.  Labs remarkable for leukocytosis (15.7), elevated lactic acid (6.5 > 4.5), elevated anion gap (17), hyperglycemia (370).  CT head/orbit showed left periorbital soft tissue swelling and edema (periorbital/preseptal cellulitis in the right clinical setting with no intraorbital extension or other acute orbital abnormalities identified).  She was given 1 L of LR, vancomycin, ceftriaxone, Protonix 40 mg IV, Zofran 4 mg IV, and Toradol 10 mg IV.  Patient was admitted for further management.      Overview/Hospital Course:  Patient admitted with preseptal cellulitis of the left eye.  Started on vancomycin and ceftriaxone.  Id consult id given  proximity to eye, patient has ulcers, suspicious for MRSA per ID.  Continuing vanc plus ceftriaxone for now.  Patient's glucoses have been in the 400s, patient is not on insulin at home.  Unclear if this is a sudden rise in glucose due to infection, or if she has been unknowingly having these high glucoses at home which has resulted in infection.  Will start insulin.    A1c came back at 11.6 and high insulin requirement so far, will need to go home with new scripts for insulin and dm supplies.       Interval History:  NAEON.  No new issues.   Denies complaints other than HAs  All questions answered and updates on care given.       ROS:  General: Negative for fevers or chills.  Cardiac: Negative for chest pain or orthopnea   Pulmonary: Negative for dyspnea or wheezing.  GI: Negative for abdominal distention or pain     Vitals:    10/08/23 0347 10/08/23 0612 10/08/23 0701 10/08/23 0749   BP:  (!) 143/69  135/63   BP Location:    Left arm   Patient Position:    Lying   Pulse:  88 84 83   Resp: 18 18 16 20   Temp:  97.9 °F (36.6 °C)  97.9 °F (36.6 °C)   TempSrc:    Oral   SpO2:  (!) 94% 97% 95%   Weight:       Height:              Body mass index is 41.13 kg/m².      PHYSICAL EXAM:  GENERAL APPEARANCE: alert and cooperative, Morbidly Obese  HEENT:     HEAD: NC/AT     EYES: PERRL, EOMI.  Vision is grossly intact.   Swelling and erythema around L eye, improving  NECK: Neck supple, non-tender without LAD, masses or thyromegaly.  CARDIAC: There is no peripheral edema, cyanosis or pallor.   LUNGS: Clear to auscultation and percussion without rales or wheezing  ABDOMEN: Non-distended. No guarding.  MSK: No joint erythema or tenderness.   EXTREMITIES: No significant deformity or joint abnormality. No edema.   NEUROLOGICAL: CN II-XII grossly intact.   SKIN: Skin normal color, texture and turgor with no lesions or eruptions.  PSYCHIATRIC: Oriented. No tangential speech. No Hyperactive features.        Recent Results (from the  past 24 hour(s))   POCT glucose    Collection Time: 10/07/23 11:39 AM   Result Value Ref Range    POCT Glucose 415 (H) 70 - 110 mg/dL   POCT glucose    Collection Time: 10/07/23 11:39 AM   Result Value Ref Range    POCT Glucose 438 (H) 70 - 110 mg/dL   POCT glucose    Collection Time: 10/07/23 11:43 AM   Result Value Ref Range    POCT Glucose 406 (H) 70 - 110 mg/dL   Glucose, random    Collection Time: 10/07/23 12:32 PM   Result Value Ref Range    Glucose 474 (HH) 70 - 110 mg/dL   POCT glucose    Collection Time: 10/07/23  2:47 PM   Result Value Ref Range    POCT Glucose 409 (H) 70 - 110 mg/dL   POCT glucose    Collection Time: 10/07/23  4:13 PM   Result Value Ref Range    POCT Glucose 332 (H) 70 - 110 mg/dL   POCT glucose    Collection Time: 10/07/23  8:06 PM   Result Value Ref Range    POCT Glucose 361 (H) 70 - 110 mg/dL   POCT glucose    Collection Time: 10/08/23  7:48 AM   Result Value Ref Range    POCT Glucose 263 (H) 70 - 110 mg/dL   VANCOMYCIN, TROUGH    Collection Time: 10/08/23  8:00 AM   Result Value Ref Range    Vancomycin-Trough 13.9 10.0 - 22.0 ug/mL   Magnesium    Collection Time: 10/08/23  8:00 AM   Result Value Ref Range    Magnesium 1.7 1.6 - 2.6 mg/dL   Phosphorus    Collection Time: 10/08/23  8:00 AM   Result Value Ref Range    Phosphorus 4.5 2.7 - 4.5 mg/dL   Comprehensive Metabolic Panel    Collection Time: 10/08/23  8:00 AM   Result Value Ref Range    Sodium 139 136 - 145 mmol/L    Potassium 3.8 3.5 - 5.1 mmol/L    Chloride 100 95 - 110 mmol/L    CO2 29 23 - 29 mmol/L    Glucose 283 (H) 70 - 110 mg/dL    BUN 9 6 - 20 mg/dL    Creatinine 0.7 0.5 - 1.4 mg/dL    Calcium 8.9 8.7 - 10.5 mg/dL    Total Protein 6.0 6.0 - 8.4 g/dL    Albumin 2.7 (L) 3.5 - 5.2 g/dL    Total Bilirubin 0.2 0.1 - 1.0 mg/dL    Alkaline Phosphatase 85 55 - 135 U/L    AST 11 10 - 40 U/L    ALT 10 10 - 44 U/L    eGFR >60 >60 mL/min/1.73 m^2    Anion Gap 10 8 - 16 mmol/L   CBC Auto Differential    Collection Time: 10/08/23   8:00 AM   Result Value Ref Range    WBC 14.54 (H) 3.90 - 12.70 K/uL    RBC 4.92 4.00 - 5.40 M/uL    Hemoglobin 10.8 (L) 12.0 - 16.0 g/dL    Hematocrit 36.7 (L) 37.0 - 48.5 %    MCV 75 (L) 82 - 98 fL    MCH 22.0 (L) 27.0 - 31.0 pg    MCHC 29.4 (L) 32.0 - 36.0 g/dL    RDW 25.9 (H) 11.5 - 14.5 %    Platelets 428 150 - 450 K/uL    MPV 9.8 9.2 - 12.9 fL    Immature Granulocytes 0.8 (H) 0.0 - 0.5 %    Gran # (ANC) 7.4 1.8 - 7.7 K/uL    Immature Grans (Abs) 0.12 (H) 0.00 - 0.04 K/uL    Lymph # 4.6 1.0 - 4.8 K/uL    Mono # 1.8 (H) 0.3 - 1.0 K/uL    Eos # 0.6 (H) 0.0 - 0.5 K/uL    Baso # 0.11 0.00 - 0.20 K/uL    nRBC 0 0 /100 WBC    Gran % 50.5 38.0 - 73.0 %    Lymph % 31.4 18.0 - 48.0 %    Mono % 12.6 4.0 - 15.0 %    Eosinophil % 3.9 0.0 - 8.0 %    Basophil % 0.8 0.0 - 1.9 %    Differential Method Automated        Microbiology Results (last 7 days)       Procedure Component Value Units Date/Time    Blood culture #2 **CANNOT BE ORDERED STAT** [[id number]] Collected: 10/06/23 1628    Order Status: Completed Specimen: Blood from Peripheral, Antecubital, Right Updated: 10/07/23 1703     Blood Culture, Routine No Growth to date      No Growth to date    Blood culture #1 **CANNOT BE ORDERED STAT** [[id number]] Collected: 10/06/23 1638    Order Status: Completed Specimen: Blood from Peripheral, Hand, Right Updated: 10/07/23 1703     Blood Culture, Routine No Growth to date      No Growth to date    Nasal culture [[id number]] Collected: 10/07/23 1305    Order Status: Sent Specimen: Nasal Swab from Nose Updated: 10/07/23 4932             Imaging Results              CT ORBITS WITH CONTRAST (Final result)  Result time 10/06/23 18:43:09      Final result by Shalom Bro MD (10/06/23 18:43:09)                   Impression:      No acute intracranial abnormalities identified.    Left periorbital soft tissue swelling and edema.  This may be seen with periorbital/preseptal cellulitis in the right clinical setting.  No intraorbital  extension or other acute orbital abnormalities identified.      Electronically signed by: Shalom Bro MD  Date:    10/06/2023  Time:    18:43               Narrative:    EXAMINATION:  CT HEAD WITH AND WITHOUT; CT ORBITS WITH CONTRAST    CLINICAL HISTORY:  infection, facial cellultis;; Orbital cellulitis suspected;    TECHNIQUE:  Low dose axial images were obtained through the head before and after administration of 65 cc Omnipaque 350 IV contrast.  Coronal and sagittal reformations were also performed.  Separate acquisition dedicated CT of the orbits was also obtained with sagittal and coronal reformats.    COMPARISON:  CT head from April 2022.    FINDINGS:  No evidence of acute/recent major vascular distribution cerebral infarction, intraparenchymal hemorrhage, or intra-axial space occupying lesion.  No obvious abnormal postcontrast parenchymal enhancement or enhancing lesion seen.  The ventricular system is normal in size and configuration with no evidence of hydrocephalus. No effacement of the skull-base cisterns. No abnormal extra-axial fluid collections or blood products.  Bilateral maxillary sinus disease with mucous membrane thickening is seen.  There is additional mild patchy ethmoid sinus disease visualized.  Remaining visualized paranasal sinuses and mastoid air cells are essentially clear.  The calvarium shows no significant abnormality.    There is left periorbital soft tissue swelling and edema.  Globes appear symmetric and intact.  No organized focal fluid collection or rim enhancing abscess seen.  No evidence of postseptal or intraorbital extension.  Orbits are otherwise normal in appearance with no acute abnormalities seen.  No acute facial fracture or orbital fracture seen.                                       CT Head W Wo Contrast (Final result)  Result time 10/06/23 18:43:09      Final result by Shalom Bro MD (10/06/23 18:43:09)                   Impression:      No acute intracranial  abnormalities identified.    Left periorbital soft tissue swelling and edema.  This may be seen with periorbital/preseptal cellulitis in the right clinical setting.  No intraorbital extension or other acute orbital abnormalities identified.      Electronically signed by: Shalom Bro MD  Date:    10/06/2023  Time:    18:43               Narrative:    EXAMINATION:  CT HEAD WITH AND WITHOUT; CT ORBITS WITH CONTRAST    CLINICAL HISTORY:  infection, facial cellultis;; Orbital cellulitis suspected;    TECHNIQUE:  Low dose axial images were obtained through the head before and after administration of 65 cc Omnipaque 350 IV contrast.  Coronal and sagittal reformations were also performed.  Separate acquisition dedicated CT of the orbits was also obtained with sagittal and coronal reformats.    COMPARISON:  CT head from April 2022.    FINDINGS:  No evidence of acute/recent major vascular distribution cerebral infarction, intraparenchymal hemorrhage, or intra-axial space occupying lesion.  No obvious abnormal postcontrast parenchymal enhancement or enhancing lesion seen.  The ventricular system is normal in size and configuration with no evidence of hydrocephalus. No effacement of the skull-base cisterns. No abnormal extra-axial fluid collections or blood products.  Bilateral maxillary sinus disease with mucous membrane thickening is seen.  There is additional mild patchy ethmoid sinus disease visualized.  Remaining visualized paranasal sinuses and mastoid air cells are essentially clear.  The calvarium shows no significant abnormality.    There is left periorbital soft tissue swelling and edema.  Globes appear symmetric and intact.  No organized focal fluid collection or rim enhancing abscess seen.  No evidence of postseptal or intraorbital extension.  Orbits are otherwise normal in appearance with no acute abnormalities seen.  No acute facial fracture or orbital fracture seen.                                              Assessment/Plan:      * Preseptal cellulitis  CT head/orbit showed left periorbital soft tissue swelling and edema (periorbital/preseptal cellulitis in the right clinical setting with no intraorbital extension or other acute orbital abnormalities identified).   Doubt orbital cellulitis   Continue ceftriaxone/vancomycin   F/U cultures   ID consulted given near eye involvement    Type 2 diabetes mellitus  Patient's FSGs are controlled on current medication regimen.  Last A1c reviewed-   Lab Results   Component Value Date    HGBA1C 7.4 (H) 03/15/2023     Most recent fingerstick glucose reviewed-   Recent Labs   Lab 10/07/23  0808 10/07/23  1139 10/07/23  1139 10/07/23  1143   POCTGLUCOSE 387* 415* 438* 406*     Current correctional scale  Low  Maintain anti-hyperglycemic dose as follows-   Antihyperglycemics (From admission, onward)      Start     Stop Route Frequency Ordered    10/07/23 1230  insulin detemir U-100 (Levemir) pen 18 Units         -- SubQ 2 times daily 10/07/23 1218    10/07/23 0806  insulin aspart U-100 pen 0-10 Units         -- SubQ Before meals & nightly PRN 10/07/23 0707          Hold Oral hypoglycemics while patient is in the hospital.  Glucose going into the 400s, will start insulin  A1c's have been great outpt.. unclear if this 2/2 infection or infection is due to suddenly poorly controlled glucose    Elevated lactic acid level  Likely in the setting of infection/metformin use. Monitor     PAD (peripheral artery disease)  No acute issues. Resume home medications       Chronic deep vein thrombosis (DVT) of both lower extremities  Resume Eliquis       SHAWN (obstructive sleep apnea)  Not on CPAP   Outpatient follow up with sleep medicine       History of pulmonary embolism  Resume Eliquis      Bipolar 1 disorder  Resume home medications       Tobacco abuse  Nicotine patch PRN    Hyperlipidemia  Resume home medications       COPD (chronic obstructive pulmonary disease)  No acute issues. PRN  Duo-Nebs    Essential hypertension  Resume home medications         VTE Risk Mitigation (From admission, onward)           Ordered     apixaban tablet 5 mg  2 times daily         10/06/23 2033     IP VTE HIGH RISK PATIENT  Once         10/06/23 2033     Place sequential compression device  Until discontinued         10/06/23 2033                    Discharge Planning   HUMBERTO:      Code Status: Full Code   Is the patient medically ready for discharge?:     Reason for patient still in hospital (select all that apply): Patient trending condition and Treatment  Discharge Plan A: Home                  Jarocho Weldon MD  Department of [facility] Medicine   [facility]

## 2023-10-08 NOTE — ASSESSMENT & PLAN NOTE
52 yo woman, admitted with preseptal cellulitis, OS  - workup initiated in ED  - empiric abx (CTX and vancomycin)  - given healing skin ulcers, clinically like MRSA  - culture nares: pending  - WBC up a bit but patient look better, clinically;  - continue CTX and vancomycin  - trend WBC and clinical response to emipric therapy  - d/w Dr. Weldon

## 2023-10-08 NOTE — NURSING
[facility] [place]  Nurses Note -- 4 Eyes      10/8/2023       Skin assessed on: Q Shift      [x] No Pressure Injuries Present    []Prevention Measures Documented    [] Yes LDA  for Pressure Injury Previously documented     [] Yes New Pressure Injury Discovered   [] LDA for New Pressure Injury Added      Attending RN:  Rosemary Tsai LPN     Second RN:  Liliana HEALY RN

## 2023-10-09 PROBLEM — L02.91 ABSCESS: Status: ACTIVE | Noted: 2023-10-09

## 2023-10-09 LAB
ALBUMIN SERPL BCP-MCNC: 2.5 G/DL (ref 3.5–5.2)
ALP SERPL-CCNC: 77 U/L (ref 55–135)
ALT SERPL W/O P-5'-P-CCNC: 12 U/L (ref 10–44)
ANION GAP SERPL CALC-SCNC: 6 MMOL/L (ref 8–16)
AST SERPL-CCNC: 11 U/L (ref 10–40)
BASOPHILS # BLD AUTO: 0.11 K/UL (ref 0–0.2)
BASOPHILS NFR BLD: 0.9 % (ref 0–1.9)
BILIRUB SERPL-MCNC: 0.1 MG/DL (ref 0.1–1)
BUN SERPL-MCNC: 14 MG/DL (ref 6–20)
CALCIUM SERPL-MCNC: 8.8 MG/DL (ref 8.7–10.5)
CHLORIDE SERPL-SCNC: 97 MMOL/L (ref 95–110)
CO2 SERPL-SCNC: 34 MMOL/L (ref 23–29)
CREAT SERPL-MCNC: 0.7 MG/DL (ref 0.5–1.4)
DIFFERENTIAL METHOD: ABNORMAL
EOSINOPHIL # BLD AUTO: 0.6 K/UL (ref 0–0.5)
EOSINOPHIL NFR BLD: 4.4 % (ref 0–8)
ERYTHROCYTE [DISTWIDTH] IN BLOOD BY AUTOMATED COUNT: 25.6 % (ref 11.5–14.5)
EST. GFR  (NO RACE VARIABLE): >60 ML/MIN/1.73 M^2
GLUCOSE SERPL-MCNC: 312 MG/DL (ref 70–110)
HCT VFR BLD AUTO: 34.4 % (ref 37–48.5)
HGB BLD-MCNC: 10 G/DL (ref 12–16)
IMM GRANULOCYTES # BLD AUTO: 0.23 K/UL (ref 0–0.04)
IMM GRANULOCYTES NFR BLD AUTO: 1.8 % (ref 0–0.5)
LYMPHOCYTES # BLD AUTO: 4.8 K/UL (ref 1–4.8)
LYMPHOCYTES NFR BLD: 37.8 % (ref 18–48)
MAGNESIUM SERPL-MCNC: 1.7 MG/DL (ref 1.6–2.6)
MCH RBC QN AUTO: 22.2 PG (ref 27–31)
MCHC RBC AUTO-ENTMCNC: 29.1 G/DL (ref 32–36)
MCV RBC AUTO: 76 FL (ref 82–98)
MONOCYTES # BLD AUTO: 1.4 K/UL (ref 0.3–1)
MONOCYTES NFR BLD: 11.3 % (ref 4–15)
NEUTROPHILS # BLD AUTO: 5.5 K/UL (ref 1.8–7.7)
NEUTROPHILS NFR BLD: 43.8 % (ref 38–73)
NRBC BLD-RTO: 0 /100 WBC
PHOSPHATE SERPL-MCNC: 4.4 MG/DL (ref 2.7–4.5)
PLATELET # BLD AUTO: 492 K/UL (ref 150–450)
PMV BLD AUTO: 9.8 FL (ref 9.2–12.9)
POCT GLUCOSE: 230 MG/DL (ref 70–110)
POCT GLUCOSE: 273 MG/DL (ref 70–110)
POCT GLUCOSE: 309 MG/DL (ref 70–110)
POCT GLUCOSE: 361 MG/DL (ref 70–110)
POTASSIUM SERPL-SCNC: 3.7 MMOL/L (ref 3.5–5.1)
PROT SERPL-MCNC: 5.6 G/DL (ref 6–8.4)
RBC # BLD AUTO: 4.5 M/UL (ref 4–5.4)
SODIUM SERPL-SCNC: 137 MMOL/L (ref 136–145)
WBC # BLD AUTO: 12.62 K/UL (ref 3.9–12.7)

## 2023-10-09 PROCEDURE — 63600175 PHARM REV CODE 636 W HCPCS: Performed by: STUDENT IN AN ORGANIZED HEALTH CARE EDUCATION/TRAINING PROGRAM

## 2023-10-09 PROCEDURE — 25000003 PHARM REV CODE 250: Performed by: STUDENT IN AN ORGANIZED HEALTH CARE EDUCATION/TRAINING PROGRAM

## 2023-10-09 PROCEDURE — 27000221 HC OXYGEN, UP TO 24 HOURS

## 2023-10-09 PROCEDURE — 99223 PR INITIAL HOSPITAL CARE,LEVL III: ICD-10-PCS | Mod: ,,, | Performed by: SURGERY

## 2023-10-09 PROCEDURE — 87205 SMEAR GRAM STAIN: CPT | Performed by: STUDENT IN AN ORGANIZED HEALTH CARE EDUCATION/TRAINING PROGRAM

## 2023-10-09 PROCEDURE — 85025 COMPLETE CBC W/AUTO DIFF WBC: CPT | Performed by: STUDENT IN AN ORGANIZED HEALTH CARE EDUCATION/TRAINING PROGRAM

## 2023-10-09 PROCEDURE — 36415 COLL VENOUS BLD VENIPUNCTURE: CPT | Performed by: STUDENT IN AN ORGANIZED HEALTH CARE EDUCATION/TRAINING PROGRAM

## 2023-10-09 PROCEDURE — 87077 CULTURE AEROBIC IDENTIFY: CPT | Performed by: STUDENT IN AN ORGANIZED HEALTH CARE EDUCATION/TRAINING PROGRAM

## 2023-10-09 PROCEDURE — 25000003 PHARM REV CODE 250

## 2023-10-09 PROCEDURE — 87075 CULTR BACTERIA EXCEPT BLOOD: CPT | Performed by: STUDENT IN AN ORGANIZED HEALTH CARE EDUCATION/TRAINING PROGRAM

## 2023-10-09 PROCEDURE — 87186 SC STD MICRODIL/AGAR DIL: CPT | Performed by: STUDENT IN AN ORGANIZED HEALTH CARE EDUCATION/TRAINING PROGRAM

## 2023-10-09 PROCEDURE — 11000001 HC ACUTE MED/SURG PRIVATE ROOM

## 2023-10-09 PROCEDURE — 80053 COMPREHEN METABOLIC PANEL: CPT | Performed by: STUDENT IN AN ORGANIZED HEALTH CARE EDUCATION/TRAINING PROGRAM

## 2023-10-09 PROCEDURE — 99223 1ST HOSP IP/OBS HIGH 75: CPT | Mod: ,,, | Performed by: SURGERY

## 2023-10-09 PROCEDURE — 99223 PR INITIAL HOSPITAL CARE,LEVL III: ICD-10-PCS | Mod: ,,, | Performed by: INTERNAL MEDICINE

## 2023-10-09 PROCEDURE — 84100 ASSAY OF PHOSPHORUS: CPT | Performed by: STUDENT IN AN ORGANIZED HEALTH CARE EDUCATION/TRAINING PROGRAM

## 2023-10-09 PROCEDURE — 87070 CULTURE OTHR SPECIMN AEROBIC: CPT | Performed by: STUDENT IN AN ORGANIZED HEALTH CARE EDUCATION/TRAINING PROGRAM

## 2023-10-09 PROCEDURE — 99223 1ST HOSP IP/OBS HIGH 75: CPT | Mod: ,,, | Performed by: INTERNAL MEDICINE

## 2023-10-09 PROCEDURE — 94640 AIRWAY INHALATION TREATMENT: CPT

## 2023-10-09 PROCEDURE — 83735 ASSAY OF MAGNESIUM: CPT | Performed by: STUDENT IN AN ORGANIZED HEALTH CARE EDUCATION/TRAINING PROGRAM

## 2023-10-09 PROCEDURE — 25000242 PHARM REV CODE 250 ALT 637 W/ HCPCS: Performed by: STUDENT IN AN ORGANIZED HEALTH CARE EDUCATION/TRAINING PROGRAM

## 2023-10-09 RX ORDER — LIDOCAINE HYDROCHLORIDE 10 MG/ML
10 INJECTION, SOLUTION EPIDURAL; INFILTRATION; INTRACAUDAL; PERINEURAL ONCE
Status: COMPLETED | OUTPATIENT
Start: 2023-10-09 | End: 2023-10-09

## 2023-10-09 RX ORDER — INSULIN ASPART 100 [IU]/ML
15 INJECTION, SOLUTION INTRAVENOUS; SUBCUTANEOUS
Status: DISCONTINUED | OUTPATIENT
Start: 2023-10-09 | End: 2023-10-11 | Stop reason: HOSPADM

## 2023-10-09 RX ORDER — KETOROLAC TROMETHAMINE 30 MG/ML
15 INJECTION, SOLUTION INTRAMUSCULAR; INTRAVENOUS ONCE
Status: COMPLETED | OUTPATIENT
Start: 2023-10-09 | End: 2023-10-09

## 2023-10-09 RX ADMIN — KETOROLAC TROMETHAMINE 30 MG: 30 INJECTION, SOLUTION INTRAMUSCULAR; INTRAVENOUS at 05:10

## 2023-10-09 RX ADMIN — KETOROLAC TROMETHAMINE 30 MG: 30 INJECTION, SOLUTION INTRAMUSCULAR; INTRAVENOUS at 11:10

## 2023-10-09 RX ADMIN — METHOCARBAMOL 500 MG: 500 TABLET ORAL at 08:10

## 2023-10-09 RX ADMIN — MUPIROCIN: 20 OINTMENT TOPICAL at 08:10

## 2023-10-09 RX ADMIN — RISPERIDONE 3 MG: 1 TABLET ORAL at 08:10

## 2023-10-09 RX ADMIN — INSULIN ASPART 4 UNITS: 100 INJECTION, SOLUTION INTRAVENOUS; SUBCUTANEOUS at 08:10

## 2023-10-09 RX ADMIN — INSULIN ASPART 15 UNITS: 100 INJECTION, SOLUTION INTRAVENOUS; SUBCUTANEOUS at 04:10

## 2023-10-09 RX ADMIN — INSULIN ASPART 10 UNITS: 100 INJECTION, SOLUTION INTRAVENOUS; SUBCUTANEOUS at 04:10

## 2023-10-09 RX ADMIN — VANCOMYCIN HYDROCHLORIDE 1500 MG: 1.5 INJECTION, POWDER, LYOPHILIZED, FOR SOLUTION INTRAVENOUS at 09:10

## 2023-10-09 RX ADMIN — Medication 1000 UNITS: at 08:10

## 2023-10-09 RX ADMIN — ACETAMINOPHEN 1000 MG: 500 TABLET, FILM COATED ORAL at 02:10

## 2023-10-09 RX ADMIN — INSULIN ASPART 6 UNITS: 100 INJECTION, SOLUTION INTRAVENOUS; SUBCUTANEOUS at 11:10

## 2023-10-09 RX ADMIN — ACETAMINOPHEN 1000 MG: 500 TABLET, FILM COATED ORAL at 05:10

## 2023-10-09 RX ADMIN — GABAPENTIN 600 MG: 300 CAPSULE ORAL at 02:10

## 2023-10-09 RX ADMIN — PRAVASTATIN SODIUM 40 MG: 40 TABLET ORAL at 08:10

## 2023-10-09 RX ADMIN — ONDANSETRON 4 MG: 2 INJECTION INTRAMUSCULAR; INTRAVENOUS at 08:10

## 2023-10-09 RX ADMIN — GABAPENTIN 600 MG: 300 CAPSULE ORAL at 08:10

## 2023-10-09 RX ADMIN — BUDESONIDE 0.5 MG: 0.5 INHALANT RESPIRATORY (INHALATION) at 07:10

## 2023-10-09 RX ADMIN — ACETAMINOPHEN 1000 MG: 500 TABLET, FILM COATED ORAL at 10:10

## 2023-10-09 RX ADMIN — FLUTICASONE PROPIONATE 100 MCG: 50 SPRAY, METERED NASAL at 08:10

## 2023-10-09 RX ADMIN — INSULIN ASPART 15 UNITS: 100 INJECTION, SOLUTION INTRAVENOUS; SUBCUTANEOUS at 08:10

## 2023-10-09 RX ADMIN — BUMETANIDE 1 MG: 1 TABLET ORAL at 08:10

## 2023-10-09 RX ADMIN — VANCOMYCIN HYDROCHLORIDE 1500 MG: 1.5 INJECTION, POWDER, LYOPHILIZED, FOR SOLUTION INTRAVENOUS at 08:10

## 2023-10-09 RX ADMIN — KETOROLAC TROMETHAMINE 15 MG: 30 INJECTION, SOLUTION INTRAMUSCULAR; INTRAVENOUS at 06:10

## 2023-10-09 RX ADMIN — LIDOCAINE HYDROCHLORIDE 100 MG: 10 INJECTION, SOLUTION EPIDURAL; INFILTRATION; INTRACAUDAL; PERINEURAL at 02:10

## 2023-10-09 RX ADMIN — CEFTRIAXONE 2 G: 2 INJECTION, POWDER, FOR SOLUTION INTRAMUSCULAR; INTRAVENOUS at 03:10

## 2023-10-09 RX ADMIN — ARFORMOTEROL TARTRATE 15 MCG: 15 SOLUTION RESPIRATORY (INHALATION) at 07:10

## 2023-10-09 RX ADMIN — APIXABAN 5 MG: 5 TABLET, FILM COATED ORAL at 08:10

## 2023-10-09 RX ADMIN — KETOROLAC TROMETHAMINE 30 MG: 30 INJECTION, SOLUTION INTRAMUSCULAR; INTRAVENOUS at 10:10

## 2023-10-09 RX ADMIN — INSULIN ASPART 15 UNITS: 100 INJECTION, SOLUTION INTRAVENOUS; SUBCUTANEOUS at 11:10

## 2023-10-09 RX ADMIN — INSULIN ASPART 4 UNITS: 100 INJECTION, SOLUTION INTRAVENOUS; SUBCUTANEOUS at 10:10

## 2023-10-09 RX ADMIN — TRAMADOL HYDROCHLORIDE 50 MG: 50 TABLET, COATED ORAL at 02:10

## 2023-10-09 RX ADMIN — SODIUM CHLORIDE 125 MG: 9 INJECTION, SOLUTION INTRAVENOUS at 08:10

## 2023-10-09 RX ADMIN — METHOCARBAMOL 500 MG: 500 TABLET ORAL at 02:10

## 2023-10-09 RX ADMIN — PANTOPRAZOLE SODIUM 40 MG: 40 TABLET, DELAYED RELEASE ORAL at 08:10

## 2023-10-09 RX ADMIN — ASPIRIN 81 MG CHEWABLE TABLET 81 MG: 81 TABLET CHEWABLE at 08:10

## 2023-10-09 RX ADMIN — DIVALPROEX SODIUM 500 MG: 250 TABLET, DELAYED RELEASE ORAL at 08:10

## 2023-10-09 NOTE — PROCEDURES
"Audrey Natarajan is a 53 y.o. female patient.    Temp: 98.6 °F (37 °C) (10/09/23 1125)  Pulse: 84 (10/09/23 1125)  Resp: 20 (10/09/23 1125)  BP: (!) 126/56 (10/09/23 1125)  SpO2: 96 % (10/09/23 1125)  Weight: 115.6 kg (254 lb 13.6 oz) (10/06/23 2139)  Height: 5' 6" (167.6 cm) (10/06/23 2139)       Incision and Drainage    Date/Time: 10/9/2023 2:16 PM  Location procedure was performed: PROV WB GENERAL SURGERY    Performed by: Aura Vargas MD  Authorized by: Aura Vargas MD  Assisting provider: Tahir Acuña MD  Consent Done: Yes  Consent: Verbal consent obtained.  Risks and benefits: risks, benefits and alternatives were discussed  Consent given by: patient  Time out: Immediately prior to procedure a "time out" was called to verify the correct patient, procedure, equipment, support staff and site/side marked as required.  Type: abscess  Body area: head/neck  Location details: scalp  Anesthesia: local infiltration    Anesthesia:  Local Anesthetic: lidocaine 1% without epinephrine  Scalpel size: 11  Incision type: single straight  Incision depth: dermal  Complexity: simple  Drainage: pus  Drainage amount: moderate  Wound treatment: incision, drainage, expression of material and wound packed  Packing material: 1/4 in gauze  Specimens: Yes (cultures)  Patient tolerance: Patient tolerated the procedure well with no immediate complications    Incision depth: dermal          10/9/2023    "

## 2023-10-09 NOTE — PROGRESS NOTES
[facility] Medicine  Progress Note    Patient Name: Audrey Natarajan  MRN: [id number]  Patient Class: IP- Inpatient   Admission Date: 10/6/2023  Length of Stay: 3 days  Attending Physician: Jarocho Weldon MD  Primary Care Provider: Donaldo Pena MD        Subjective:     Principal Problem:Preseptal cellulitis        HPI:  This is a 53-year-old female with a past medical history of COPD, type 2 diabetes, hypertension, hyperlipidemia, PAD, bipolar disorder, VTE (on Eliquis), tobacco use, s/p left BKA who presents with eye swelling.      Patient presents with left eye swelling that was noted a day prior to presentation.  She initially had an eczematous rash on her scalp that started 4 days prior.  It was pruritic and painful like someone tugging on her hair.  She reports scratching at it and that it was progressively getting worse.  She later developed headache and right-sided glandular swelling.  On the night prior to presentation, she noted developing left eye swelling with some mild pressure.  She denied having limitation of eye movements, pain with eye movements, decreased vision or double vision. She recently finished a steroid course on 10/3.    In the ED, the patient was hemodynamically stable.  Per ED provider, she had normal intra-ocular pressures.  Labs remarkable for leukocytosis (15.7), elevated lactic acid (6.5 > 4.5), elevated anion gap (17), hyperglycemia (370).  CT head/orbit showed left periorbital soft tissue swelling and edema (periorbital/preseptal cellulitis in the right clinical setting with no intraorbital extension or other acute orbital abnormalities identified).  She was given 1 L of LR, vancomycin, ceftriaxone, Protonix 40 mg IV, Zofran 4 mg IV, and Toradol 10 mg IV.  Patient was admitted for further management.      Overview/Hospital Course:  Patient admitted with preseptal cellulitis of the left eye.  Started on vancomycin and ceftriaxone.  Id consult id given  proximity to eye, patient has ulcers, suspicious for MRSA per ID.  Continuing vanc plus ceftriaxone for now.  Patient's glucoses have been in the 400s, patient is not on insulin at home.  Unclear if this is a sudden rise in glucose due to infection, or if she has been unknowingly having these high glucoses at home which has resulted in infection.  Will start insulin.    A1c came back at 11.6 and high insulin requirement so far, will need to go home with new scripts for insulin and dm supplies.  WBC count normalized today, and face looking much better.  Up to 60 units long-acting insulin and 45 units short-acting +SSI, and glucose still elevated.  Patient did finish a steroid pack a few days before admission and is likely interfered with her A1c, however patient showing no signs of not needing insulin at this point.      Interval History:  NAEON.  No new issues.   Denies complaints other than HAs  All questions answered and updates on care given.       ROS:  General: Negative for fevers or chills.  Cardiac: Negative for chest pain or orthopnea   Pulmonary: Negative for dyspnea or wheezing.  GI: Negative for abdominal distention or pain     Vitals:    10/09/23 0726 10/09/23 0727 10/09/23 0742 10/09/23 1125   BP:   137/64 (!) 126/56   BP Location:       Patient Position:   Lying Lying   Pulse: 84 84 79 84   Resp: 18 18 20 20   Temp:   98 °F (36.7 °C) 98.6 °F (37 °C)   TempSrc:   Oral Oral   SpO2: (!) 94%  (!) 92% 96%   Weight:       Height:              Body mass index is 41.13 kg/m².      PHYSICAL EXAM:  GENERAL APPEARANCE: alert and cooperative, Morbidly Obese  HEENT:     HEAD: NC/AT     EYES: PERRL, EOMI.  Vision is grossly intact.   Swelling and erythema around L eye, improving  NECK: Neck supple, non-tender without LAD, masses or thyromegaly.  CARDIAC: There is no peripheral edema, cyanosis or pallor.   LUNGS: Clear to auscultation and percussion without rales or wheezing  ABDOMEN: Non-distended. No  guarding.  MSK: No joint erythema or tenderness.   EXTREMITIES: No significant deformity or joint abnormality. No edema.   NEUROLOGICAL: CN II-XII grossly intact.   SKIN: Skin normal color, texture and turgor with no lesions or eruptions.  PSYCHIATRIC: Oriented. No tangential speech. No Hyperactive features.        Recent Results (from the past 24 hour(s))   POCT glucose    Collection Time: 10/08/23  4:53 PM   Result Value Ref Range    POCT Glucose 286 (H) 70 - 110 mg/dL   POCT glucose    Collection Time: 10/08/23  7:41 PM   Result Value Ref Range    POCT Glucose 385 (H) 70 - 110 mg/dL   Magnesium    Collection Time: 10/09/23  4:11 AM   Result Value Ref Range    Magnesium 1.7 1.6 - 2.6 mg/dL   Phosphorus    Collection Time: 10/09/23  4:11 AM   Result Value Ref Range    Phosphorus 4.4 2.7 - 4.5 mg/dL   Comprehensive Metabolic Panel    Collection Time: 10/09/23  4:11 AM   Result Value Ref Range    Sodium 137 136 - 145 mmol/L    Potassium 3.7 3.5 - 5.1 mmol/L    Chloride 97 95 - 110 mmol/L    CO2 34 (H) 23 - 29 mmol/L    Glucose 312 (H) 70 - 110 mg/dL    BUN 14 6 - 20 mg/dL    Creatinine 0.7 0.5 - 1.4 mg/dL    Calcium 8.8 8.7 - 10.5 mg/dL    Total Protein 5.6 (L) 6.0 - 8.4 g/dL    Albumin 2.5 (L) 3.5 - 5.2 g/dL    Total Bilirubin 0.1 0.1 - 1.0 mg/dL    Alkaline Phosphatase 77 55 - 135 U/L    AST 11 10 - 40 U/L    ALT 12 10 - 44 U/L    eGFR >60 >60 mL/min/1.73 m^2    Anion Gap 6 (L) 8 - 16 mmol/L   CBC Auto Differential    Collection Time: 10/09/23  4:11 AM   Result Value Ref Range    WBC 12.62 3.90 - 12.70 K/uL    RBC 4.50 4.00 - 5.40 M/uL    Hemoglobin 10.0 (L) 12.0 - 16.0 g/dL    Hematocrit 34.4 (L) 37.0 - 48.5 %    MCV 76 (L) 82 - 98 fL    MCH 22.2 (L) 27.0 - 31.0 pg    MCHC 29.1 (L) 32.0 - 36.0 g/dL    RDW 25.6 (H) 11.5 - 14.5 %    Platelets 492 (H) 150 - 450 K/uL    MPV 9.8 9.2 - 12.9 fL    Immature Granulocytes 1.8 (H) 0.0 - 0.5 %    Gran # (ANC) 5.5 1.8 - 7.7 K/uL    Immature Grans (Abs) 0.23 (H) 0.00 - 0.04  K/uL    Lymph # 4.8 1.0 - 4.8 K/uL    Mono # 1.4 (H) 0.3 - 1.0 K/uL    Eos # 0.6 (H) 0.0 - 0.5 K/uL    Baso # 0.11 0.00 - 0.20 K/uL    nRBC 0 0 /100 WBC    Gran % 43.8 38.0 - 73.0 %    Lymph % 37.8 18.0 - 48.0 %    Mono % 11.3 4.0 - 15.0 %    Eosinophil % 4.4 0.0 - 8.0 %    Basophil % 0.9 0.0 - 1.9 %    Differential Method Automated    POCT glucose    Collection Time: 10/09/23  7:43 AM   Result Value Ref Range    POCT Glucose 230 (H) 70 - 110 mg/dL       Microbiology Results (last 7 days)       Procedure Component Value Units Date/Time    Nasal culture [[id number]] Collected: 10/07/23 1305    Order Status: Completed Specimen: Nasal Swab from Nose Updated: 10/09/23 0906     RESPIRATORY CULTURE - NASAL Further report to follow    Narrative:      R/o MRSA carriage    Blood culture #1 **CANNOT BE ORDERED STAT** [[id number]] Collected: 10/06/23 1638    Order Status: Completed Specimen: Blood from Peripheral, Hand, Right Updated: 10/08/23 1703     Blood Culture, Routine No Growth to date      No Growth to date      No Growth to date    Blood culture #2 **CANNOT BE ORDERED STAT** [[id number]] Collected: 10/06/23 1628    Order Status: Completed Specimen: Blood from Peripheral, Antecubital, Right Updated: 10/08/23 1703     Blood Culture, Routine No Growth to date      No Growth to date      No Growth to date             Imaging Results              CT ORBITS WITH CONTRAST (Final result)  Result time 10/06/23 18:43:09      Final result by Shalom Bro MD (10/06/23 18:43:09)                   Impression:      No acute intracranial abnormalities identified.    Left periorbital soft tissue swelling and edema.  This may be seen with periorbital/preseptal cellulitis in the right clinical setting.  No intraorbital extension or other acute orbital abnormalities identified.      Electronically signed by: Shalom Bro MD  Date:    10/06/2023  Time:    18:43               Narrative:    EXAMINATION:  CT HEAD WITH AND WITHOUT; CT  ORBITS WITH CONTRAST    CLINICAL HISTORY:  infection, facial cellultis;; Orbital cellulitis suspected;    TECHNIQUE:  Low dose axial images were obtained through the head before and after administration of 65 cc Omnipaque 350 IV contrast.  Coronal and sagittal reformations were also performed.  Separate acquisition dedicated CT of the orbits was also obtained with sagittal and coronal reformats.    COMPARISON:  CT head from April 2022.    FINDINGS:  No evidence of acute/recent major vascular distribution cerebral infarction, intraparenchymal hemorrhage, or intra-axial space occupying lesion.  No obvious abnormal postcontrast parenchymal enhancement or enhancing lesion seen.  The ventricular system is normal in size and configuration with no evidence of hydrocephalus. No effacement of the skull-base cisterns. No abnormal extra-axial fluid collections or blood products.  Bilateral maxillary sinus disease with mucous membrane thickening is seen.  There is additional mild patchy ethmoid sinus disease visualized.  Remaining visualized paranasal sinuses and mastoid air cells are essentially clear.  The calvarium shows no significant abnormality.    There is left periorbital soft tissue swelling and edema.  Globes appear symmetric and intact.  No organized focal fluid collection or rim enhancing abscess seen.  No evidence of postseptal or intraorbital extension.  Orbits are otherwise normal in appearance with no acute abnormalities seen.  No acute facial fracture or orbital fracture seen.                                       CT Head W Wo Contrast (Final result)  Result time 10/06/23 18:43:09      Final result by Shalom Bro MD (10/06/23 18:43:09)                   Impression:      No acute intracranial abnormalities identified.    Left periorbital soft tissue swelling and edema.  This may be seen with periorbital/preseptal cellulitis in the right clinical setting.  No intraorbital extension or other acute orbital  abnormalities identified.      Electronically signed by: Shalom Bro MD  Date:    10/06/2023  Time:    18:43               Narrative:    EXAMINATION:  CT HEAD WITH AND WITHOUT; CT ORBITS WITH CONTRAST    CLINICAL HISTORY:  infection, facial cellultis;; Orbital cellulitis suspected;    TECHNIQUE:  Low dose axial images were obtained through the head before and after administration of 65 cc Omnipaque 350 IV contrast.  Coronal and sagittal reformations were also performed.  Separate acquisition dedicated CT of the orbits was also obtained with sagittal and coronal reformats.    COMPARISON:  CT head from April 2022.    FINDINGS:  No evidence of acute/recent major vascular distribution cerebral infarction, intraparenchymal hemorrhage, or intra-axial space occupying lesion.  No obvious abnormal postcontrast parenchymal enhancement or enhancing lesion seen.  The ventricular system is normal in size and configuration with no evidence of hydrocephalus. No effacement of the skull-base cisterns. No abnormal extra-axial fluid collections or blood products.  Bilateral maxillary sinus disease with mucous membrane thickening is seen.  There is additional mild patchy ethmoid sinus disease visualized.  Remaining visualized paranasal sinuses and mastoid air cells are essentially clear.  The calvarium shows no significant abnormality.    There is left periorbital soft tissue swelling and edema.  Globes appear symmetric and intact.  No organized focal fluid collection or rim enhancing abscess seen.  No evidence of postseptal or intraorbital extension.  Orbits are otherwise normal in appearance with no acute abnormalities seen.  No acute facial fracture or orbital fracture seen.                                             Assessment/Plan:      * Preseptal cellulitis  CT head/orbit showed left periorbital soft tissue swelling and edema (periorbital/preseptal cellulitis in the right clinical setting with no intraorbital extension or  other acute orbital abnormalities identified).   Doubt orbital cellulitis   Continue ceftriaxone/vancomycin   F/U cultures   ID consulted given near eye involvement    Type 2 diabetes mellitus  Patient's FSGs are controlled on current medication regimen.  Last A1c reviewed-   Lab Results   Component Value Date    HGBA1C 11.6 (H) 10/07/2023     Most recent fingerstick glucose reviewed-   Recent Labs   Lab 10/08/23  1653 10/08/23  1941 10/09/23  0743   POCTGLUCOSE 286* 385* 230*     Current correctional scale  Low  Maintain anti-hyperglycemic dose as follows-   Antihyperglycemics (From admission, onward)      Start     Stop Route Frequency Ordered    10/09/23 0900  insulin detemir U-100 (Levemir) pen 30 Units         -- SubQ 2 times daily 10/09/23 0736    10/09/23 0745  insulin aspart U-100 pen 15 Units         -- SubQ 3 times daily with meals 10/09/23 0736    10/07/23 0806  insulin aspart U-100 pen 0-10 Units         -- SubQ Before meals & nightly PRN 10/07/23 0707          Hold Oral hypoglycemics while patient is in the hospital.  Glucose going into the 400s, will start insulin  A1c's have been great outpt.. unclear if this 2/2 infection or infection is due to suddenly poorly controlled glucose  Up to 60 units long-acting insulin and 45 units short-acting +SSI, and glucose still elevated.   Patient did finish a steroid pack a few days before admission and is likely interfered with her A1c, however patient showing no signs of not needing insulin at this point.    Elevated lactic acid level  Likely in the setting of infection/metformin use. Monitor     PAD (peripheral artery disease)  No acute issues. Resume home medications       Chronic deep vein thrombosis (DVT) of both lower extremities  Resume Eliquis       SHAWN (obstructive sleep apnea)  Not on CPAP   Outpatient follow up with sleep medicine       History of pulmonary embolism  Resume Eliquis      Bipolar 1 disorder  Resume home medications       Tobacco  abuse  Nicotine patch PRN    Hyperlipidemia  Resume home medications       COPD (chronic obstructive pulmonary disease)  No acute issues. PRN Duo-Nebs    Essential hypertension  Resume home medications         VTE Risk Mitigation (From admission, onward)           Ordered     apixaban tablet 5 mg  2 times daily         10/06/23 2033     IP VTE HIGH RISK PATIENT  Once         10/06/23 2033     Place sequential compression device  Until discontinued         10/06/23 2033                    Discharge Planning   HUMBERTO:      Code Status: Full Code   Is the patient medically ready for discharge?:     Reason for patient still in hospital (select all that apply): Patient trending condition and Treatment  Discharge Plan A: Home                  Jarocho Weldon MD  Department of Hospital Medicine   [facility]

## 2023-10-09 NOTE — PROGRESS NOTES
"[facility] Surg  Infectious Disease  Progress Note    Patient Name: Audrey Natarajan  MRN: [id number]  Admission Date: 10/6/2023  Length of Stay: 3 days  Attending Physician: Jarocho Weldon MD  Primary Care Provider: Donaldo Pena MD    Isolation Status: No active isolations  Assessment/Plan:      ID  * Preseptal cellulitis  52 yo woman, admitted with preseptal cellulitis. Improving on vanc and ceftriaxone.     Recommendations:  - gen surg eval for I+D of scalp abscess      - culture nares: pending     - continue CTX and vancomycin for now.       A/p discussed with primary team.     Anticipated Disposition: tbd    Thank you for your consult. I will follow-up with patient. Please contact us if you have any additional questions.    Nida Salazar MD  Infectious Disease  [facility]    Subjective:     Principal Problem:Preseptal cellulitis    HPI: 52 yo woman admitted with preseptal cellulitis of the left eye. The patient has a remote history of "boils" and recently developed URI symptoms and swelling of her right face. She tells me that she kept touching the area (she is an admitted "") and subsequently developed "sores" on her head and left forehead. When her eye started to swell, she came to the ED and was admitted. The patient underwent CT imaging which confirmed that diagnosis. She was started on empiric bx after blood cultures were obtained and ID is consulted.    Interval History: remained afebrile overnight. Notes improvement in facial cellulitis. However, complains of large knot on her scalp that is increasing in size and is painful.    Review of Systems   Constitutional:  Negative for chills and fever.   HENT:  Positive for facial swelling. Negative for mouth sores, rhinorrhea and sinus pain.    Eyes:  Negative for pain and visual disturbance.   Skin:  Positive for color change.   Neurological:  Positive for headaches.   All other systems reviewed and are " negative.    Objective:     Vital Signs (Most Recent):  Temp: 98.6 °F (37 °C) (10/09/23 1125)  Pulse: 84 (10/09/23 1125)  Resp: 20 (10/09/23 1125)  BP: (!) 126/56 (10/09/23 1125)  SpO2: 96 % (10/09/23 1125) Vital Signs (24h Range):  Temp:  [97.8 °F (36.6 °C)-98.6 °F (37 °C)] 98.6 °F (37 °C)  Pulse:  [79-93] 84  Resp:  [18-20] 20  SpO2:  [86 %-98 %] 96 %  BP: (126-139)/(56-66) 126/56     Weight: 115.6 kg (254 lb 13.6 oz)  Body mass index is 41.13 kg/m².    Estimated Creatinine Clearance: 122.8 mL/min (based on SCr of 0.7 mg/dL).     Physical Exam  Vitals reviewed.   Constitutional:       General: She is not in acute distress.     Appearance: Normal appearance. She is well-developed.   HENT:      Head: Normocephalic and atraumatic.      Comments: Large fluctuant knot on scalp with black scab. Tender to palpation.   Eyes:      Conjunctiva/sclera: Conjunctivae normal.      Pupils: Pupils are equal, round, and reactive to light.      Comments: L eye- minimal edema inferior to eye. No pain with eye movement.    Cardiovascular:      Rate and Rhythm: Normal rate and regular rhythm.      Heart sounds: Normal heart sounds.   Pulmonary:      Effort: Pulmonary effort is normal. No respiratory distress.      Breath sounds: Normal breath sounds.   Abdominal:      General: Bowel sounds are normal. There is no distension.      Palpations: Abdomen is soft.   Musculoskeletal:         General: Normal range of motion.      Cervical back: Normal range of motion and neck supple.   Skin:     General: Skin is warm and dry.   Neurological:      General: No focal deficit present.      Mental Status: She is alert and oriented to person, place, and time.      Cranial Nerves: No cranial nerve deficit.   Psychiatric:         Mood and Affect: Mood normal.         Behavior: Behavior normal.          Significant Labs: Blood Culture:   Recent Labs   Lab 05/15/23  2313 05/15/23  2321 10/06/23  1628 10/06/23  1638   LABBLOO No Growth after 4 days. No  Growth after 4 days. No Growth to date  No Growth to date  No Growth to date No Growth to date  No Growth to date  No Growth to date     All pertinent labs within the past 24 hours have been reviewed.    Significant Imaging: I have reviewed all pertinent imaging results/findings within the past 24 hours.     no

## 2023-10-09 NOTE — SUBJECTIVE & OBJECTIVE
No current facility-administered medications on file prior to encounter.     Current Outpatient Medications on File Prior to Encounter   Medication Sig    albuterol (PROVENTIL/VENTOLIN HFA) 90 mcg/actuation inhaler INHALE 2 PUFFS INTO THE LUNGS EVERY 6 HOURS AS NEEDED FOR WHEEZING. RESCUE    albuterol-ipratropium (DUO-NEB) 2.5 mg-0.5 mg/3 mL nebulizer solution Take 3 mLs by nebulization every 6 (six) hours as needed for Wheezing or Shortness of Breath. Rescue    apixaban (ELIQUIS) 5 mg Tab Take 1 tablet (5 mg total) by mouth in the morning and 1 tablet (5 mg total) in the evening. Indications: Thromboembolism secondary to Atrial Fibrillation.    aspirin 81 MG Chew Take 1 tablet (81 mg total) by mouth once daily.    benzonatate (TESSALON) 100 MG capsule Take 1 capsule (100 mg total) by mouth 3 (three) times daily as needed for Cough.    bumetanide (BUMEX) 1 MG tablet Take 1 tablet (1 mg total) by mouth once daily.    divalproex (DEPAKOTE) 500 MG TbEC Take 1 tablet (500 mg total) by mouth once daily. PO QAM (Patient taking differently: Take 500 mg by mouth every evening.)    doxycycline (VIBRAMYCIN) 100 MG Cap Take 1 capsule (100 mg total) by mouth 2 (two) times daily.    ferrous sulfate 325 (65 FE) MG EC tablet Take 1 tablet (325 mg total) by mouth once daily.    fluconazole (DIFLUCAN) 150 MG Tab Take 1 tablet (150 mg total) by mouth once daily. May take second tab po two days after first if symptoms persist    fluticasone propionate (FLONASE) 50 mcg/actuation nasal spray 2 sprays (100 mcg total) by Each Nostril route once daily.    fluticasone-salmeterol diskus inhaler 250-50 mcg Inhale 1 puff into the lungs 2 (two) times daily. Controller    gabapentin (NEURONTIN) 300 MG capsule TAKE 2 CAPSULES(600 MG) BY MOUTH THREE TIMES DAILY    hydrOXYzine HCL (ATARAX) 25 MG tablet Take 1 tablet (25 mg total) by mouth every 6 (six) hours as needed for itching or anxiety.    LIDOcaine (LIDODERM) 5 % Place 1 patch onto the skin  once daily. Remove & Discard patch within 12 hours or as directed by MD    lisinopriL 10 MG tablet Take 1 tablet (10 mg total) by mouth once daily.    loratadine (CLARITIN) 10 mg tablet TAKE 1 TABLET BY MOUTH DAILY    metFORMIN (GLUCOPHAGE) 1000 MG tablet TAKE 1 TABLET(1000 MG) BY MOUTH TWICE DAILY WITH MEALS    methocarbamoL (ROBAXIN) 500 MG Tab Take 1 tablet (500 mg total) by mouth 3 (three) times daily. Frequency could not be confirmed.    metoprolol tartrate (LOPRESSOR) 25 MG tablet Take 1 tablet (25 mg total) by mouth 2 (two) times daily.    multivitamin Tab Take 1 tablet by mouth once daily.    nystatin (NYSTOP) powder APPLY TO ABDOMINAL AND BREAST SKIN FOLD TWICE DAILY.    pantoprazole (PROTONIX) 40 MG tablet Take 1 tablet (40 mg total) by mouth once daily.    pravastatin (PRAVACHOL) 40 MG tablet Take 1 tablet (40 mg total) by mouth every evening.    [] promethazine-dextromethorphan (PROMETHAZINE-DM) 6.25-15 mg/5 mL Syrp Take 5 mLs by mouth every 6 (six) hours as needed (cough).    risperiDONE (RISPERDAL) 3 MG Tab Take 1 tablet (3 mg total) by mouth in the morning and 1 tablet (3 mg total) in the evening. Indications: Mood.    semaglutide (OZEMPIC) 1 mg/dose (2 mg/1.5 mL) PnIj Inject 1 mg into the skin every 7 days.    traMADoL (ULTRAM) 50 mg tablet Take 1 tablet (50 mg total) by mouth every 8 (eight) hours as needed for Pain (foot pain).    vitamin E 1000 UNIT capsule Take 1,000 Units by mouth once daily.    ammonium lactate (LAC-HYDRIN) 12 % lotion APPL Y ONCE TOPICALLY TWICE DAILY FOR 30 DAYS    BIOFREEZE, MENTHOL, TOP Apply topically.    cyanocobalamin (VITAMIN B-12) 1000 MCG tablet Take 100 mcg by mouth once daily.    DUPIXENT  mg/2 mL PnIj Inject into the skin every 14 (fourteen) days.    VYVANSE 40 mg Cap Take 40 mg by mouth once daily.    [DISCONTINUED] diclofenac sodium (VOLTAREN) 1 % Gel Apply 2 g topically 4 (four) times daily as needed (Apply to painful area up to 4 times a day as  needed for pain). Apply to painful area 4 times a day as needed for pain    [DISCONTINUED] furosemide (LASIX) 20 MG tablet TAKE 1 TABLET(20 MG) BY MOUTH EVERY DAY    [DISCONTINUED] QUEtiapine (SEROQUEL) 200 MG Tab Take 1 tablet (200 mg total) by mouth before breakfast.       Review of patient's allergies indicates:   Allergen Reactions    Morphine Other (See Comments)     Patient had a psychotic episode after taking Morphine  Agitation, hallucinations  Other Reaction(s): Other (See Comments), Other (See Comments)    Patient had a psychotic episode after taking Morphine    Patient had a psychotic episode after taking Morphine Agitation, hallucinations    Penicillins Anaphylaxis     Tolerated cephalosporins in the past    Januvia [sitagliptin] Hives    Carbamazepine Other (See Comments)     hyponatremia  Other Reaction(s): Other (See Comments)    hyponatremia       Past Medical History:   Diagnosis Date    ADHD (attention deficit hyperactivity disorder)     Arthritis     Asthma     Bipolar 1 disorder     Cataract     Cigarette smoker     COPD (chronic obstructive pulmonary disease)     Coronary artery disease     A fib    Depression     bipolar manic depresson    Diabetes mellitus     Diabetic foot ulcers     Diabetic neuropathy     DVT of lower extremity, bilateral 07/2013    bilateral LE DVT. Estelita filter placed.     Encounter for blood transfusion     History of blood clots 1. Left Leg=2003; 2.Bilateral Groin=Blood Clots= 5 or 6/ 2013 & 7/2013; 3. LLL of Lung=7/2013;  4. Lt. Lower Leg=7/2013.     Pt. had 1st Blood Clot after Ohsmfkfgwyra=3745, & Last=2013. [place] Switch Filter= Rt.Lateral Neck.    HTN (hypertension) 06/06/2013    Pt states that she does not have hypertension    Hypercholesteremia     Irregular heartbeat     Neuromuscular disorder     neuropathy feet    Obese     PE (pulmonary embolism) 07/2013    bilat LE DVT.     Restless leg syndrome      Past Surgical History:   Procedure Laterality Date     "ABDOMINAL SURGERY  2010    gastric sleeve    BELOW KNEE AMPUTATION OF LOWER EXTREMITY Left 4/19/2023    Procedure: AMPUTATION, BELOW KNEE;  Surgeon: Gabe Munoz MD;  Location: [facility] OR;  Service: General;  Laterality: Left;  RN PREOP 4/11/2023    BILATERAL OOPHORECTOMY Bilateral 1/12/2015    CHOLECYSTECTOMY      DEBRIDEMENT OF FOOT Bilateral 5/10/2022    Procedure: DEBRIDEMENT, FOOT;  Surgeon: Maira De Los Santos DPM;  Location: [facility] OR;  Service: Podiatry;  Laterality: Bilateral;    DEBRIDEMENT OF FOOT Left 2/28/2023    Procedure: DEBRIDEMENT, FOOT,biopsy;  Surgeon: Maira De Los Santos DPM;  Location: [facility] OR;  Service: Podiatry;  Laterality: Left;  request misonix, wound VAC, possible neoxx    Green' s filter Right 7/4/2012    Right Neck & Tunneled Down.    HERNIA REPAIR      "[place] of Hernias Repaires around th Belly Button.", pt. states    INCISION AND DRAINAGE FOOT Left 12/24/2022    Procedure: INCISION AND DRAINAGE, FOOT;  Surgeon: Fahad Razo DPM;  Location: [facility] OR;  Service: Podiatry;  Laterality: Left;    LAPAROSCOPIC CHOLECYSTECTOMY N/A 9/10/2020    Procedure: CHOLECYSTECTOMY, LAPAROSCOPIC;  Surgeon: Montrell Gutierrez MD;  Location: [facility] OR;  Service: General;  Laterality: N/A;  RN PREOP 9/9----COVID Negative  9/9    OVARIAN CYST REMOVAL  3/13/2014    ND REMOVAL OF OVARY/TUBE(S)      SPLENECTOMY, TOTAL  July 2003    TONSILLECTOMY      as a child    TYMPANOSTOMY TUBE PLACEMENT  1976    VEIN SURGERY  2003    Lt leg     Family History       Problem Relation (Age of Onset)    Cataracts Father    Diabetes Father, Paternal Grandfather    Heart disease Father, Paternal Grandfather    Hypertension Father    No Known Problems Mother, Sister, Brother, Maternal Aunt, Maternal Uncle, Paternal Aunt, Paternal Uncle, Maternal Grandfather    Ovarian cancer Maternal Grandmother, Paternal Grandmother          Tobacco Use    Smoking status: Former     Current packs/day: 0.00     Average packs/day: 0.5 packs/day for 37.0 years " (18.5 ttl pk-yrs)     Types: Cigarettes     Start date: 1983     Quit date: 2020     Years since quittin.8    Smokeless tobacco: Current    Tobacco comments:     Enrolled in the [employer] on 5/3/14 ([facility] Member ID # [id number]). Ambulatory referral to Smoking Cessation Program   Substance and Sexual Activity    Alcohol use: No     Alcohol/week: 0.0 standard drinks of alcohol    Drug use: No    Sexual activity: Yes     Partners: Male     Review of Systems   Constitutional:  Negative for chills and fever.   Respiratory:  Negative for shortness of breath.    Cardiovascular:  Negative for chest pain.   Skin:         Lump on head, tender     Objective:     Vital Signs (Most Recent):  Temp: 98.6 °F (37 °C) (10/09/23 1125)  Pulse: 84 (10/09/23 112)  Resp: 20 (10/09/23 1125)  BP: (!) 126/56 (10/09/23 112)  SpO2: 96 % (10/09/23 1125) Vital Signs (24h Range):  Temp:  [97.8 °F (36.6 °C)-98.6 °F (37 °C)] 98.6 °F (37 °C)  Pulse:  [79-93] 84  Resp:  [18-20] 20  SpO2:  [86 %-98 %] 96 %  BP: (126-139)/(56-66) 126/56     Weight: 115.6 kg (254 lb 13.6 oz)  Body mass index is 41.13 kg/m².     Physical Exam  Vitals and nursing note reviewed.   Constitutional:       General: She is not in acute distress.  HENT:      Head:      Comments: Tender, erythematous mass on head that appears to be infected epidermoid cyst  Pulmonary:      Effort: Pulmonary effort is normal.   Abdominal:      General: There is no distension.      Palpations: Abdomen is soft.      Tenderness: There is no abdominal tenderness.   Skin:     General: Skin is warm and dry.   Neurological:      General: No focal deficit present.      Mental Status: She is alert and oriented to person, place, and time.            I have reviewed all pertinent lab results within the past 24 hours.  CBC:   Recent Labs   Lab 10/09/23  0411   WBC 12.62   RBC 4.50   HGB 10.0*   HCT 34.4*   *   MCV 76*   MCH 22.2*   MCHC 29.1*     CMP:   Recent Labs   Lab  10/09/23  0411   *   CALCIUM 8.8   ALBUMIN 2.5*   PROT 5.6*      K 3.7   CO2 34*   CL 97   BUN 14   CREATININE 0.7   ALKPHOS 77   ALT 12   AST 11   BILITOT 0.1       Significant Diagnostics:  I have reviewed all pertinent imaging results/findings within the past 24 hours.

## 2023-10-09 NOTE — PROGRESS NOTES
Vancomycin consult follow-up:    Patient reviewed, renal function stable, no new levels, continue current therapy; Next levels due: trough due 10/10/2023 at 0800

## 2023-10-09 NOTE — ASSESSMENT & PLAN NOTE
Patient's FSGs are controlled on current medication regimen.  Last A1c reviewed-   Lab Results   Component Value Date    HGBA1C 11.6 (H) 10/07/2023     Most recent fingerstick glucose reviewed-   Recent Labs   Lab 10/08/23  1653 10/08/23  1941 10/09/23  0743   POCTGLUCOSE 286* 385* 230*     Current correctional scale  Low  Maintain anti-hyperglycemic dose as follows-   Antihyperglycemics (From admission, onward)    Start     Stop Route Frequency Ordered    10/09/23 0900  insulin detemir U-100 (Levemir) pen 30 Units         -- SubQ 2 times daily 10/09/23 0736    10/09/23 0745  insulin aspart U-100 pen 15 Units         -- SubQ 3 times daily with meals 10/09/23 0736    10/07/23 0806  insulin aspart U-100 pen 0-10 Units         -- SubQ Before meals & nightly PRN 10/07/23 0707        Hold Oral hypoglycemics while patient is in the hospital.  Glucose going into the 400s, will start insulin  A1c's have been great outpt.. unclear if this 2/2 infection or infection is due to suddenly poorly controlled glucose  Up to 60 units long-acting insulin and 45 units short-acting +SSI, and glucose still elevated.   Patient did finish a steroid pack a few days before admission and is likely interfered with her A1c, however patient showing no signs of not needing insulin at this point.

## 2023-10-09 NOTE — NURSING
Patient oxygen dropped to 86% on RA while sleeping. Reports that she is on waiting list for CPAP but currently sleeps on 2 liters at night. Patient was placed on 3 liters and O2 and stats went up to 97%. Plan of care ongoing.

## 2023-10-09 NOTE — ASSESSMENT & PLAN NOTE
54 yo woman, admitted with preseptal cellulitis. Improving on vanc and ceftriaxone.     Recommendations:  - gen surg eval for I+D of scalp abscess      - culture nares: pending     - continue CTX and vancomycin for now.

## 2023-10-09 NOTE — PLAN OF CARE
No acute distress noted, patient free from falls or injury this shift.  Bed in low position, wheels locked, call light in reach for assistance, plan of care continued.       Problem: Diabetes Comorbidity  Goal: Blood Glucose Level Within Targeted Range  Outcome: Ongoing, Progressing     Problem: Bariatric Environmental Safety  Goal: Safety Maintained with Care  Outcome: Ongoing, Progressing     Problem: Fall Injury Risk  Goal: Absence of Fall and Fall-Related Injury  Outcome: Ongoing, Progressing     Problem: Skin or Soft Tissue Infection  Goal: Absence of Infection Signs and Symptoms  Outcome: Ongoing, Progressing

## 2023-10-09 NOTE — ASSESSMENT & PLAN NOTE
Audrey Natarajan is a 53 y.o. lady with a scalp abscess now s/p drainage    - cultures sent off  - change dressing as needed  - remove packing tomorrow

## 2023-10-09 NOTE — CONSULTS
[facility] Surg  General Surgery  Consult Note    Patient Name: Audrey Natarajan  MRN: [id number]  Code Status: Full Code  Admission Date: 10/6/2023  Hospital Length of Stay: 3 days  Attending Physician: Jarocho Weldon MD  Primary Care Provider: Donaldo Pena MD    Patient information was obtained from patient, past medical records and ER records.     Inpatient consult to General Surgery  Consult performed by: Aura Vargas MD  Consult ordered by: Jarocho Weldon MD        Subjective:     Principal Problem: Preseptal cellulitis    History of Present Illness: Audrey Natarajan is a 53 y.o. lady with COPD, DM, HTN, HLD, VTE on eliquis who is admitted with periorbital cellulitis and. She has been on antibiotics and is improving. She was found to have an abscess on her scalp. She's had it for a week and reports associated tenderness. Fluctuance on exam. No fevers or chills. No drainage. General surgery consulted for evaluation of I+D.      No current facility-administered medications on file prior to encounter.     Current Outpatient Medications on File Prior to Encounter   Medication Sig    albuterol (PROVENTIL/VENTOLIN HFA) 90 mcg/actuation inhaler INHALE 2 PUFFS INTO THE LUNGS EVERY 6 HOURS AS NEEDED FOR WHEEZING. RESCUE    albuterol-ipratropium (DUO-NEB) 2.5 mg-0.5 mg/3 mL nebulizer solution Take 3 mLs by nebulization every 6 (six) hours as needed for Wheezing or Shortness of Breath. Rescue    apixaban (ELIQUIS) 5 mg Tab Take 1 tablet (5 mg total) by mouth in the morning and 1 tablet (5 mg total) in the evening. Indications: Thromboembolism secondary to Atrial Fibrillation.    aspirin 81 MG Chew Take 1 tablet (81 mg total) by mouth once daily.    benzonatate (TESSALON) 100 MG capsule Take 1 capsule (100 mg total) by mouth 3 (three) times daily as needed for Cough.    bumetanide (BUMEX) 1 MG tablet Take 1 tablet (1 mg total) by mouth once daily.    divalproex (DEPAKOTE) 500 MG TbEC Take 1 tablet  (500 mg total) by mouth once daily. PO QAM (Patient taking differently: Take 500 mg by mouth every evening.)    doxycycline (VIBRAMYCIN) 100 MG Cap Take 1 capsule (100 mg total) by mouth 2 (two) times daily.    ferrous sulfate 325 (65 FE) MG EC tablet Take 1 tablet (325 mg total) by mouth once daily.    fluconazole (DIFLUCAN) 150 MG Tab Take 1 tablet (150 mg total) by mouth once daily. May take second tab po two days after first if symptoms persist    fluticasone propionate (FLONASE) 50 mcg/actuation nasal spray 2 sprays (100 mcg total) by Each Nostril route once daily.    fluticasone-salmeterol diskus inhaler 250-50 mcg Inhale 1 puff into the lungs 2 (two) times daily. Controller    gabapentin (NEURONTIN) 300 MG capsule TAKE 2 CAPSULES(600 MG) BY MOUTH THREE TIMES DAILY    hydrOXYzine HCL (ATARAX) 25 MG tablet Take 1 tablet (25 mg total) by mouth every 6 (six) hours as needed for itching or anxiety.    LIDOcaine (LIDODERM) 5 % Place 1 patch onto the skin once daily. Remove & Discard patch within 12 hours or as directed by MD    lisinopriL 10 MG tablet Take 1 tablet (10 mg total) by mouth once daily.    loratadine (CLARITIN) 10 mg tablet TAKE 1 TABLET BY MOUTH DAILY    metFORMIN (GLUCOPHAGE) 1000 MG tablet TAKE 1 TABLET(1000 MG) BY MOUTH TWICE DAILY WITH MEALS    methocarbamoL (ROBAXIN) 500 MG Tab Take 1 tablet (500 mg total) by mouth 3 (three) times daily. Frequency could not be confirmed.    metoprolol tartrate (LOPRESSOR) 25 MG tablet Take 1 tablet (25 mg total) by mouth 2 (two) times daily.    multivitamin Tab Take 1 tablet by mouth once daily.    nystatin (NYSTOP) powder APPLY TO ABDOMINAL AND BREAST SKIN FOLD TWICE DAILY.    pantoprazole (PROTONIX) 40 MG tablet Take 1 tablet (40 mg total) by mouth once daily.    pravastatin (PRAVACHOL) 40 MG tablet Take 1 tablet (40 mg total) by mouth every evening.    [] promethazine-dextromethorphan (PROMETHAZINE-DM) 6.25-15 mg/5 mL Syrp Take 5 mLs  by mouth every 6 (six) hours as needed (cough).    risperiDONE (RISPERDAL) 3 MG Tab Take 1 tablet (3 mg total) by mouth in the morning and 1 tablet (3 mg total) in the evening. Indications: Mood.    semaglutide (OZEMPIC) 1 mg/dose (2 mg/1.5 mL) PnIj Inject 1 mg into the skin every 7 days.    traMADoL (ULTRAM) 50 mg tablet Take 1 tablet (50 mg total) by mouth every 8 (eight) hours as needed for Pain (foot pain).    vitamin E 1000 UNIT capsule Take 1,000 Units by mouth once daily.    ammonium lactate (LAC-HYDRIN) 12 % lotion APPL Y ONCE TOPICALLY TWICE DAILY FOR 30 DAYS    BIOFREEZE, MENTHOL, TOP Apply topically.    cyanocobalamin (VITAMIN B-12) 1000 MCG tablet Take 100 mcg by mouth once daily.    DUPIXENT  mg/2 mL PnIj Inject into the skin every 14 (fourteen) days.    VYVANSE 40 mg Cap Take 40 mg by mouth once daily.    [DISCONTINUED] diclofenac sodium (VOLTAREN) 1 % Gel Apply 2 g topically 4 (four) times daily as needed (Apply to painful area up to 4 times a day as needed for pain). Apply to painful area 4 times a day as needed for pain    [DISCONTINUED] furosemide (LASIX) 20 MG tablet TAKE 1 TABLET(20 MG) BY MOUTH EVERY DAY    [DISCONTINUED] QUEtiapine (SEROQUEL) 200 MG Tab Take 1 tablet (200 mg total) by mouth before breakfast.       Review of patient's allergies indicates:   Allergen Reactions    Morphine Other (See Comments)     Patient had a psychotic episode after taking Morphine  Agitation, hallucinations  Other Reaction(s): Other (See Comments), Other (See Comments)    Patient had a psychotic episode after taking Morphine    Patient had a psychotic episode after taking Morphine Agitation, hallucinations    Penicillins Anaphylaxis     Tolerated cephalosporins in the past    Januvia [sitagliptin] Hives    Carbamazepine Other (See Comments)     hyponatremia  Other Reaction(s): Other (See Comments)    hyponatremia       Past Medical History:   Diagnosis Date    ADHD (attention deficit  "hyperactivity disorder)     Arthritis     Asthma     Bipolar 1 disorder     Cataract     Cigarette smoker     COPD (chronic obstructive pulmonary disease)     Coronary artery disease     A fib    Depression     bipolar manic depresson    Diabetes mellitus     Diabetic foot ulcers     Diabetic neuropathy     DVT of lower extremity, bilateral 07/2013    bilateral LE DVT. Estelita filter placed.     Encounter for blood transfusion     History of blood clots 1. Left Leg=2003; 2.Bilateral Groin=Blood Clots= 5 or 6/ 2013 & 7/2013; 3. LLL of Lung=7/2013;  4. Lt. Lower Leg=7/2013.     Pt. had 1st Blood Clot after Wxwahvncnvej=9147, & Last=2013. [place] Filter= Rt.Lateral Neck.    HTN (hypertension) 06/06/2013    Pt states that she does not have hypertension    Hypercholesteremia     Irregular heartbeat     Neuromuscular disorder     neuropathy feet    Obese     PE (pulmonary embolism) 07/2013    bilat LE DVT.     Restless leg syndrome      Past Surgical History:   Procedure Laterality Date    ABDOMINAL SURGERY  2010    gastric sleeve    BELOW KNEE AMPUTATION OF LOWER EXTREMITY Left 4/19/2023    Procedure: AMPUTATION, BELOW KNEE;  Surgeon: Gabe Munoz MD;  Location: [facility] OR;  Service: General;  Laterality: Left;  RN PREOP 4/11/2023    BILATERAL OOPHORECTOMY Bilateral 1/12/2015    CHOLECYSTECTOMY      DEBRIDEMENT OF FOOT Bilateral 5/10/2022    Procedure: DEBRIDEMENT, FOOT;  Surgeon: Maira De Los Santos DPM;  Location: [facility] OR;  Service: Podiatry;  Laterality: Bilateral;    DEBRIDEMENT OF FOOT Left 2/28/2023    Procedure: DEBRIDEMENT, FOOT,biopsy;  Surgeon: Maira De Los Santos DPM;  Location: [facility] OR;  Service: Podiatry;  Laterality: Left;  request misonix, wound VAC, possible neoxx    Green' s filter Right 7/4/2012    Right Neck & Tunneled Down.    HERNIA REPAIR      "[place] of Hernias Repaires around th Belly Button.", pt. states    INCISION AND DRAINAGE FOOT Left 12/24/2022    Procedure: " INCISION AND DRAINAGE, FOOT;  Surgeon: Fahad Razo DPM;  Location: [facility] OR;  Service: Podiatry;  Laterality: Left;    LAPAROSCOPIC CHOLECYSTECTOMY N/A 9/10/2020    Procedure: CHOLECYSTECTOMY, LAPAROSCOPIC;  Surgeon: Montrell Gutierrez MD;  Location: [facility] OR;  Service: General;  Laterality: N/A;  RN PREOP ----COVID Negative      OVARIAN CYST REMOVAL  3/13/2014    PA REMOVAL OF OVARY/TUBE(S)      SPLENECTOMY, TOTAL  2003    TONSILLECTOMY      as a child    TYMPANOSTOMY TUBE PLACEMENT      VEIN SURGERY      Lt leg     Family History       Problem Relation (Age of Onset)    Cataracts Father    Diabetes Father, Paternal Grandfather    Heart disease Father, Paternal Grandfather    Hypertension Father    No Known Problems Mother, Sister, Brother, Maternal Aunt, Maternal Uncle, Paternal Aunt, Paternal Uncle, Maternal Grandfather    Ovarian cancer Maternal Grandmother, Paternal Grandmother          Tobacco Use    Smoking status: Former     Current packs/day: 0.00     Average packs/day: 0.5 packs/day for 37.0 years (18.5 ttl pk-yrs)     Types: Cigarettes     Start date: 1983     Quit date: 2020     Years since quittin.8    Smokeless tobacco: Current    Tobacco comments:     Enrolled in the [employer] on 5/3/14 ([facility] Member ID # [id number]). Ambulatory referral to Smoking Cessation Program   Substance and Sexual Activity    Alcohol use: No     Alcohol/week: 0.0 standard drinks of alcohol    Drug use: No    Sexual activity: Yes     Partners: Male     Review of Systems   Constitutional:  Negative for chills and fever.   Respiratory:  Negative for shortness of breath.    Cardiovascular:  Negative for chest pain.   Skin:         Lump on head, tender     Objective:     Vital Signs (Most Recent):  Temp: 98.6 °F (37 °C) (10/09/23 1125)  Pulse: 84 (10/09/23 1125)  Resp: 20 (10/09/23 1125)  BP: (!) 126/56 (10/09/23 1125)  SpO2: 96 % (10/09/23 1125) Vital Signs (24h Range):  Temp:  [97.8  °F (36.6 °C)-98.6 °F (37 °C)] 98.6 °F (37 °C)  Pulse:  [79-93] 84  Resp:  [18-20] 20  SpO2:  [86 %-98 %] 96 %  BP: (126-139)/(56-66) 126/56     Weight: 115.6 kg (254 lb 13.6 oz)  Body mass index is 41.13 kg/m².     Physical Exam  Vitals and nursing note reviewed.   Constitutional:       General: She is not in acute distress.  HENT:      Head:      Comments: Tender, erythematous mass on head that appears to be infected epidermoid cyst  Pulmonary:      Effort: Pulmonary effort is normal.   Abdominal:      General: There is no distension.      Palpations: Abdomen is soft.      Tenderness: There is no abdominal tenderness.   Skin:     General: Skin is warm and dry.   Neurological:      General: No focal deficit present.      Mental Status: She is alert and oriented to person, place, and time.            I have reviewed all pertinent lab results within the past 24 hours.  CBC:   Recent Labs   Lab 10/09/23  0411   WBC 12.62   RBC 4.50   HGB 10.0*   HCT 34.4*   *   MCV 76*   MCH 22.2*   MCHC 29.1*     CMP:   Recent Labs   Lab 10/09/23  0411   *   CALCIUM 8.8   ALBUMIN 2.5*   PROT 5.6*      K 3.7   CO2 34*   CL 97   BUN 14   CREATININE 0.7   ALKPHOS 77   ALT 12   AST 11   BILITOT 0.1       Significant Diagnostics:  I have reviewed all pertinent imaging results/findings within the past 24 hours.      Assessment/Plan:     Domenica Natarajan is a 53 y.o. lady with a scalp abscess now s/p drainage    - cultures sent off  - change dressing as needed  - remove packing tomorrow      VTE Risk Mitigation (From admission, onward)         Ordered     apixaban tablet 5 mg  2 times daily         10/06/23 2033     IP VTE HIGH RISK PATIENT  Once         10/06/23 2033     Place sequential compression device  Until discontinued         10/06/23 2033                Thank you for your consult. I will follow-up with patient. Please contact us if you have any additional questions.    Aura Vargas MD  General  Surgery  [facility] Surg

## 2023-10-09 NOTE — SUBJECTIVE & OBJECTIVE
Interval History: remained afebrile overnight. Notes improvement in facial cellulitis. However, complains of large knot on her scalp that is increasing in size and is painful.    Review of Systems   Constitutional:  Negative for chills and fever.   HENT:  Positive for facial swelling. Negative for mouth sores, rhinorrhea and sinus pain.    Eyes:  Negative for pain and visual disturbance.   Skin:  Positive for color change.   Neurological:  Positive for headaches.   All other systems reviewed and are negative.    Objective:     Vital Signs (Most Recent):  Temp: 98.6 °F (37 °C) (10/09/23 1125)  Pulse: 84 (10/09/23 1125)  Resp: 20 (10/09/23 1125)  BP: (!) 126/56 (10/09/23 1125)  SpO2: 96 % (10/09/23 1125) Vital Signs (24h Range):  Temp:  [97.8 °F (36.6 °C)-98.6 °F (37 °C)] 98.6 °F (37 °C)  Pulse:  [79-93] 84  Resp:  [18-20] 20  SpO2:  [86 %-98 %] 96 %  BP: (126-139)/(56-66) 126/56     Weight: 115.6 kg (254 lb 13.6 oz)  Body mass index is 41.13 kg/m².    Estimated Creatinine Clearance: 122.8 mL/min (based on SCr of 0.7 mg/dL).     Physical Exam  Vitals reviewed.   Constitutional:       General: She is not in acute distress.     Appearance: Normal appearance. She is well-developed.   HENT:      Head: Normocephalic and atraumatic.      Comments: Large fluctuant knot on scalp with black scab. Tender to palpation.   Eyes:      Conjunctiva/sclera: Conjunctivae normal.      Pupils: Pupils are equal, round, and reactive to light.      Comments: L eye- minimal edema inferior to eye. No pain with eye movement.    Cardiovascular:      Rate and Rhythm: Normal rate and regular rhythm.      Heart sounds: Normal heart sounds.   Pulmonary:      Effort: Pulmonary effort is normal. No respiratory distress.      Breath sounds: Normal breath sounds.   Abdominal:      General: Bowel sounds are normal. There is no distension.      Palpations: Abdomen is soft.   Musculoskeletal:         General: Normal range of motion.      Cervical back:  Normal range of motion and neck supple.   Skin:     General: Skin is warm and dry.   Neurological:      General: No focal deficit present.      Mental Status: She is alert and oriented to person, place, and time.      Cranial Nerves: No cranial nerve deficit.   Psychiatric:         Mood and Affect: Mood normal.         Behavior: Behavior normal.          Significant Labs: Blood Culture:   Recent Labs   Lab 05/15/23  2313 05/15/23  2321 10/06/23  1628 10/06/23  1638   LABBLOO No Growth after 4 days. No Growth after 4 days. No Growth to date  No Growth to date  No Growth to date No Growth to date  No Growth to date  No Growth to date     All pertinent labs within the past 24 hours have been reviewed.    Significant Imaging: I have reviewed all pertinent imaging results/findings within the past 24 hours.

## 2023-10-09 NOTE — NURSING
Received report care assumed. Patient lying in bed resting, NAD noted. Safety precautions maintained.     [facility]  Nurses Note -- 4 Eyes      10/8/2023       Skin assessed on: Q Shift       [x]No Pressure Injuries Present    [x]Prevention Measures Documented    [] Yes LDA  for Pressure Injury Previously documented     [] Yes New Pressure Injury Discovered   [] LDA for New Pressure Injury Added      Attending RN:  Elena Mast LPN     Second RN:  Rosemary Tsai LPN

## 2023-10-09 NOTE — HPI
Audrey Natarajan is a 53 y.o. lady with COPD, DM, HTN, HLD, VTE on eliquis who is admitted with periorbital cellulitis and. She has been on antibiotics and is improving. She was found to have an abscess on her scalp. She's had it for a week and reports associated tenderness. Fluctuance on exam. No fevers or chills. No drainage. General surgery consulted for evaluation of I+D.

## 2023-10-10 ENCOUNTER — TELEPHONE (OUTPATIENT)
Dept: REHABILITATION | Facility: HOSPITAL | Age: 53
End: 2023-10-10
Payer: MEDICAID

## 2023-10-10 PROBLEM — L02.811 ABSCESS OF SCALP: Status: ACTIVE | Noted: 2023-10-09

## 2023-10-10 LAB
ALBUMIN SERPL BCP-MCNC: 2.4 G/DL (ref 3.5–5.2)
ALP SERPL-CCNC: 79 U/L (ref 55–135)
ALT SERPL W/O P-5'-P-CCNC: 11 U/L (ref 10–44)
ANION GAP SERPL CALC-SCNC: 11 MMOL/L (ref 8–16)
AST SERPL-CCNC: 12 U/L (ref 10–40)
BACTERIA BLD CULT: NORMAL
BACTERIA BLD CULT: NORMAL
BASOPHILS # BLD AUTO: 0.14 K/UL (ref 0–0.2)
BASOPHILS NFR BLD: 1.2 % (ref 0–1.9)
BILIRUB SERPL-MCNC: 0.1 MG/DL (ref 0.1–1)
BUN SERPL-MCNC: 11 MG/DL (ref 6–20)
CALCIUM SERPL-MCNC: 8.6 MG/DL (ref 8.7–10.5)
CHLORIDE SERPL-SCNC: 96 MMOL/L (ref 95–110)
CO2 SERPL-SCNC: 31 MMOL/L (ref 23–29)
CREAT SERPL-MCNC: 0.7 MG/DL (ref 0.5–1.4)
DIFFERENTIAL METHOD: ABNORMAL
EOSINOPHIL # BLD AUTO: 0.6 K/UL (ref 0–0.5)
EOSINOPHIL NFR BLD: 5.3 % (ref 0–8)
ERYTHROCYTE [DISTWIDTH] IN BLOOD BY AUTOMATED COUNT: 26 % (ref 11.5–14.5)
EST. GFR  (NO RACE VARIABLE): >60 ML/MIN/1.73 M^2
GLUCOSE SERPL-MCNC: 315 MG/DL (ref 70–110)
GRAM STN SPEC: NORMAL
GRAM STN SPEC: NORMAL
HCT VFR BLD AUTO: 33.4 % (ref 37–48.5)
HGB BLD-MCNC: 9.6 G/DL (ref 12–16)
IMM GRANULOCYTES # BLD AUTO: 0.3 K/UL (ref 0–0.04)
IMM GRANULOCYTES NFR BLD AUTO: 2.5 % (ref 0–0.5)
LYMPHOCYTES # BLD AUTO: 4.3 K/UL (ref 1–4.8)
LYMPHOCYTES NFR BLD: 36.1 % (ref 18–48)
MAGNESIUM SERPL-MCNC: 1.8 MG/DL (ref 1.6–2.6)
MCH RBC QN AUTO: 22.1 PG (ref 27–31)
MCHC RBC AUTO-ENTMCNC: 28.7 G/DL (ref 32–36)
MCV RBC AUTO: 77 FL (ref 82–98)
MONOCYTES # BLD AUTO: 1.4 K/UL (ref 0.3–1)
MONOCYTES NFR BLD: 12.1 % (ref 4–15)
NEUTROPHILS # BLD AUTO: 5 K/UL (ref 1.8–7.7)
NEUTROPHILS NFR BLD: 42.8 % (ref 38–73)
NRBC BLD-RTO: 1 /100 WBC
PHOSPHATE SERPL-MCNC: 4 MG/DL (ref 2.7–4.5)
PLATELET # BLD AUTO: 529 K/UL (ref 150–450)
PMV BLD AUTO: 10.5 FL (ref 9.2–12.9)
POCT GLUCOSE: 256 MG/DL (ref 70–110)
POCT GLUCOSE: 277 MG/DL (ref 70–110)
POCT GLUCOSE: 316 MG/DL (ref 70–110)
POCT GLUCOSE: 320 MG/DL (ref 70–110)
POTASSIUM SERPL-SCNC: 4 MMOL/L (ref 3.5–5.1)
PROT SERPL-MCNC: 5.6 G/DL (ref 6–8.4)
RBC # BLD AUTO: 4.34 M/UL (ref 4–5.4)
SODIUM SERPL-SCNC: 138 MMOL/L (ref 136–145)
VANCOMYCIN TROUGH SERPL-MCNC: 15.3 UG/ML (ref 10–22)
WBC # BLD AUTO: 11.78 K/UL (ref 3.9–12.7)

## 2023-10-10 PROCEDURE — 63600175 PHARM REV CODE 636 W HCPCS: Performed by: STUDENT IN AN ORGANIZED HEALTH CARE EDUCATION/TRAINING PROGRAM

## 2023-10-10 PROCEDURE — 94640 AIRWAY INHALATION TREATMENT: CPT

## 2023-10-10 PROCEDURE — 85025 COMPLETE CBC W/AUTO DIFF WBC: CPT | Performed by: STUDENT IN AN ORGANIZED HEALTH CARE EDUCATION/TRAINING PROGRAM

## 2023-10-10 PROCEDURE — 83735 ASSAY OF MAGNESIUM: CPT | Performed by: STUDENT IN AN ORGANIZED HEALTH CARE EDUCATION/TRAINING PROGRAM

## 2023-10-10 PROCEDURE — 97161 PT EVAL LOW COMPLEX 20 MIN: CPT

## 2023-10-10 PROCEDURE — 94761 N-INVAS EAR/PLS OXIMETRY MLT: CPT

## 2023-10-10 PROCEDURE — 25000003 PHARM REV CODE 250: Performed by: STUDENT IN AN ORGANIZED HEALTH CARE EDUCATION/TRAINING PROGRAM

## 2023-10-10 PROCEDURE — 25000003 PHARM REV CODE 250: Performed by: HOSPITALIST

## 2023-10-10 PROCEDURE — 84100 ASSAY OF PHOSPHORUS: CPT | Performed by: STUDENT IN AN ORGANIZED HEALTH CARE EDUCATION/TRAINING PROGRAM

## 2023-10-10 PROCEDURE — 11000001 HC ACUTE MED/SURG PRIVATE ROOM

## 2023-10-10 PROCEDURE — 99232 SBSQ HOSP IP/OBS MODERATE 35: CPT | Mod: ,,, | Performed by: INTERNAL MEDICINE

## 2023-10-10 PROCEDURE — 36415 COLL VENOUS BLD VENIPUNCTURE: CPT | Performed by: STUDENT IN AN ORGANIZED HEALTH CARE EDUCATION/TRAINING PROGRAM

## 2023-10-10 PROCEDURE — 25000242 PHARM REV CODE 250 ALT 637 W/ HCPCS: Performed by: STUDENT IN AN ORGANIZED HEALTH CARE EDUCATION/TRAINING PROGRAM

## 2023-10-10 PROCEDURE — 97530 THERAPEUTIC ACTIVITIES: CPT

## 2023-10-10 PROCEDURE — 99232 PR SUBSEQUENT HOSPITAL CARE,LEVL II: ICD-10-PCS | Mod: ,,, | Performed by: SURGERY

## 2023-10-10 PROCEDURE — 97165 OT EVAL LOW COMPLEX 30 MIN: CPT

## 2023-10-10 PROCEDURE — 94760 N-INVAS EAR/PLS OXIMETRY 1: CPT

## 2023-10-10 PROCEDURE — 80053 COMPREHEN METABOLIC PANEL: CPT | Performed by: STUDENT IN AN ORGANIZED HEALTH CARE EDUCATION/TRAINING PROGRAM

## 2023-10-10 PROCEDURE — 27000221 HC OXYGEN, UP TO 24 HOURS

## 2023-10-10 PROCEDURE — 99232 SBSQ HOSP IP/OBS MODERATE 35: CPT | Mod: ,,, | Performed by: SURGERY

## 2023-10-10 PROCEDURE — 25000003 PHARM REV CODE 250: Performed by: NURSE PRACTITIONER

## 2023-10-10 PROCEDURE — 99232 PR SUBSEQUENT HOSPITAL CARE,LEVL II: ICD-10-PCS | Mod: ,,, | Performed by: INTERNAL MEDICINE

## 2023-10-10 PROCEDURE — 80202 ASSAY OF VANCOMYCIN: CPT | Performed by: STUDENT IN AN ORGANIZED HEALTH CARE EDUCATION/TRAINING PROGRAM

## 2023-10-10 RX ORDER — TRAMADOL HYDROCHLORIDE 50 MG/1
50 TABLET ORAL EVERY 8 HOURS PRN
Status: DISCONTINUED | OUTPATIENT
Start: 2023-10-10 | End: 2023-10-11 | Stop reason: HOSPADM

## 2023-10-10 RX ORDER — MUPIROCIN 20 MG/G
OINTMENT TOPICAL DAILY
Status: DISCONTINUED | OUTPATIENT
Start: 2023-10-10 | End: 2023-10-11 | Stop reason: HOSPADM

## 2023-10-10 RX ORDER — DOXYLAMINE SUCCINATE 25 MG
TABLET ORAL 2 TIMES DAILY
Status: DISCONTINUED | OUTPATIENT
Start: 2023-10-10 | End: 2023-10-10

## 2023-10-10 RX ORDER — OXYCODONE AND ACETAMINOPHEN 5; 325 MG/1; MG/1
1 TABLET ORAL EVERY 4 HOURS PRN
Status: DISCONTINUED | OUTPATIENT
Start: 2023-10-10 | End: 2023-10-11 | Stop reason: HOSPADM

## 2023-10-10 RX ORDER — MICONAZOLE NITRATE 2 %
POWDER (GRAM) TOPICAL 2 TIMES DAILY
Status: DISCONTINUED | OUTPATIENT
Start: 2023-10-10 | End: 2023-10-11 | Stop reason: HOSPADM

## 2023-10-10 RX ADMIN — ACETAMINOPHEN 1000 MG: 500 TABLET, FILM COATED ORAL at 06:10

## 2023-10-10 RX ADMIN — ONDANSETRON 4 MG: 2 INJECTION INTRAMUSCULAR; INTRAVENOUS at 09:10

## 2023-10-10 RX ADMIN — METHOCARBAMOL 500 MG: 500 TABLET ORAL at 02:10

## 2023-10-10 RX ADMIN — GABAPENTIN 600 MG: 300 CAPSULE ORAL at 02:10

## 2023-10-10 RX ADMIN — PANTOPRAZOLE SODIUM 40 MG: 40 TABLET, DELAYED RELEASE ORAL at 09:10

## 2023-10-10 RX ADMIN — VANCOMYCIN HYDROCHLORIDE 1500 MG: 1.5 INJECTION, POWDER, LYOPHILIZED, FOR SOLUTION INTRAVENOUS at 09:10

## 2023-10-10 RX ADMIN — RISPERIDONE 3 MG: 1 TABLET ORAL at 09:10

## 2023-10-10 RX ADMIN — INSULIN ASPART 15 UNITS: 100 INJECTION, SOLUTION INTRAVENOUS; SUBCUTANEOUS at 04:10

## 2023-10-10 RX ADMIN — MUPIROCIN: 20 OINTMENT TOPICAL at 09:10

## 2023-10-10 RX ADMIN — GABAPENTIN 600 MG: 300 CAPSULE ORAL at 09:10

## 2023-10-10 RX ADMIN — KETOROLAC TROMETHAMINE 30 MG: 30 INJECTION, SOLUTION INTRAMUSCULAR; INTRAVENOUS at 06:10

## 2023-10-10 RX ADMIN — PRAVASTATIN SODIUM 40 MG: 40 TABLET ORAL at 09:10

## 2023-10-10 RX ADMIN — MUPIROCIN: 20 OINTMENT TOPICAL at 11:10

## 2023-10-10 RX ADMIN — INSULIN ASPART 8 UNITS: 100 INJECTION, SOLUTION INTRAVENOUS; SUBCUTANEOUS at 08:10

## 2023-10-10 RX ADMIN — OXYCODONE HYDROCHLORIDE AND ACETAMINOPHEN 1 TABLET: 5; 325 TABLET ORAL at 07:10

## 2023-10-10 RX ADMIN — TRAMADOL HYDROCHLORIDE 50 MG: 50 TABLET, COATED ORAL at 04:10

## 2023-10-10 RX ADMIN — DIVALPROEX SODIUM 500 MG: 250 TABLET, DELAYED RELEASE ORAL at 09:10

## 2023-10-10 RX ADMIN — ONDANSETRON 4 MG: 2 INJECTION INTRAMUSCULAR; INTRAVENOUS at 10:10

## 2023-10-10 RX ADMIN — INSULIN ASPART 15 UNITS: 100 INJECTION, SOLUTION INTRAVENOUS; SUBCUTANEOUS at 08:10

## 2023-10-10 RX ADMIN — Medication 1000 UNITS: at 09:10

## 2023-10-10 RX ADMIN — VANCOMYCIN HYDROCHLORIDE 1500 MG: 1.5 INJECTION, POWDER, LYOPHILIZED, FOR SOLUTION INTRAVENOUS at 10:10

## 2023-10-10 RX ADMIN — METHOCARBAMOL 500 MG: 500 TABLET ORAL at 09:10

## 2023-10-10 RX ADMIN — ARFORMOTEROL TARTRATE 15 MCG: 15 SOLUTION RESPIRATORY (INHALATION) at 08:10

## 2023-10-10 RX ADMIN — APIXABAN 5 MG: 5 TABLET, FILM COATED ORAL at 09:10

## 2023-10-10 RX ADMIN — BUDESONIDE 0.5 MG: 0.5 INHALANT RESPIRATORY (INHALATION) at 08:10

## 2023-10-10 RX ADMIN — ACETAMINOPHEN 1000 MG: 500 TABLET, FILM COATED ORAL at 09:10

## 2023-10-10 RX ADMIN — TRAMADOL HYDROCHLORIDE 50 MG: 50 TABLET, COATED ORAL at 12:10

## 2023-10-10 RX ADMIN — INSULIN ASPART 15 UNITS: 100 INJECTION, SOLUTION INTRAVENOUS; SUBCUTANEOUS at 11:10

## 2023-10-10 RX ADMIN — FLUTICASONE PROPIONATE 100 MCG: 50 SPRAY, METERED NASAL at 09:10

## 2023-10-10 RX ADMIN — OXYCODONE HYDROCHLORIDE AND ACETAMINOPHEN 1 TABLET: 5; 325 TABLET ORAL at 09:10

## 2023-10-10 RX ADMIN — FERROUS SULFATE TAB 325 MG (65 MG ELEMENTAL FE) 1 EACH: 325 (65 FE) TAB at 09:10

## 2023-10-10 RX ADMIN — INSULIN ASPART 6 UNITS: 100 INJECTION, SOLUTION INTRAVENOUS; SUBCUTANEOUS at 04:10

## 2023-10-10 RX ADMIN — CEFTRIAXONE 2 G: 2 INJECTION, POWDER, FOR SOLUTION INTRAMUSCULAR; INTRAVENOUS at 03:10

## 2023-10-10 RX ADMIN — BUMETANIDE 1 MG: 1 TABLET ORAL at 09:10

## 2023-10-10 RX ADMIN — POLYETHYLENE GLYCOL 3350 17 G: 17 POWDER, FOR SOLUTION ORAL at 09:10

## 2023-10-10 RX ADMIN — INSULIN ASPART 3 UNITS: 100 INJECTION, SOLUTION INTRAVENOUS; SUBCUTANEOUS at 09:10

## 2023-10-10 RX ADMIN — INSULIN ASPART 8 UNITS: 100 INJECTION, SOLUTION INTRAVENOUS; SUBCUTANEOUS at 11:10

## 2023-10-10 RX ADMIN — MICONAZOLE NITRATE: 20 POWDER TOPICAL at 06:10

## 2023-10-10 RX ADMIN — ASPIRIN 81 MG CHEWABLE TABLET 81 MG: 81 TABLET CHEWABLE at 09:10

## 2023-10-10 NOTE — PT/OT/SLP EVAL
Physical Therapy Evaluation    Patient Name:  Audrey Natarajan   MRN:  [id number]    Recommendations:     Discharge Recommendations:  (resume outpatient PT services) with caregiver support  Discharge Equipment Recommendations: none   Barriers to discharge: None    Assessment:     Audrey Natarajan is a 53 y.o. female admitted with a medical diagnosis of Preseptal cellulitis.  She presents with the following impairments/functional limitations: weakness, impaired endurance, impaired sensation, impaired self care skills, impaired functional mobility, gait instability, impaired balance, decreased lower extremity function, decreased safety awareness, pain, impaired skin, edema.    Rehab Prognosis: Good; patient would benefit from acute skilled PT services to address these deficits and reach maximum level of function.    Recent Surgery: * No surgery found *      Plan:     During this hospitalization, patient to be seen 5 x/week to address the identified rehab impairments via gait training, therapeutic activities, therapeutic exercises, wheelchair management/training and progress toward the following goals:    Plan of Care Expires:  10/24/23    Subjective     Chief Complaint: weakness  Patient/Family Comments/goals: Pt agreeable to therapy.   Pain/Comfort:  Pain Rating 1:  (Pt c/o a headache.)      Living Environment:  Pt lives alone in a [facility] with 1 OLLIE at entry.   Prior to admission, patients level of function was mod I with ambulation using SPC/rollator and LLE prosthetic.  Pt was driving PTA.  Equipment at home: cane, straight, rollator, wheelchair, bedside commode, shower chair (knee scooter).  Upon discharge, patient will have assistance from her friend, Mr. Talavera, sister, and godmother.  Pt reported that she does not have a close relationship with her mother.     Objective:     Patient found up in w/chair with peripheral IV, pulse ox (continuous), telemetry, PureWick  upon PT entry to room.    General  Precautions: Standard, fall, diabetic  Orthopedic Precautions: (L BKA, no prosthetic present in room)   Braces:  (LLE prosthetic currently at home)  Respiratory Status: Room air    Exams:  Cognitive Exam:  Patient was able to follow multiple commands.   Gross Motor Coordination:  WFL  Postural Exam:  Patient presented with the following abnormalities:    -       No postural abnormalities identified  Sensation:    -       Intact  light/touch BLE; Pt with h/o DM neuropathy, reported some numbness/tingling sensation to R foot.  Skin Integrity/Edema:      -       Skin integrity: altered skin integrity to face; cyst to top of head  -       Edema: Mild RLE  BLE ROM: WFL  BLE Strength: WFL    Functional Mobility: Pt found up in w/c working with BREANNA Calhoun.  spO2 on RA 94% and HR 80's bpm.  PT assisted pt with donning of R tennis shoe while pt sitting in w/c.  Gait training limited 2* no LLE prosthetic at this time.  PT will continue to assess.    Transfers:     Sit to Stand:  contact guard assistance with rolling walker from w/c; Pt resting her L knee on the wc/ for support.  Bed to W/Chair: contact guard assistance with  no AD  using  Stand Pivot with L knee resting on the w/c per BREANNA Calhoun  Gait: Pt took ~2 forward hops with CGA using RW/R tennis shoe and w/c to follow.  Pt reported RLE feeling weak and shaky from being in hospital bed for the last 4 days.  Pt declined further gait training without LLE prosthetic.  Pt preferred w/c mobility at this time.   Balance: Pt with fair static standing balance.   Wheelchair Propulsion:  Pt propelled Standard wheelchair x 400 feet on Level tile with  Bilateral upper/RLE extremity with Supervision or Set-up Assistance.       AM-PAC 6 CLICK MOBILITY  Total Score:17       Treatment & Education:  BLE seated therex x15 reps: hip flex, LAQ, pillow squeezes/GS    Pt encouraged to continue with LE therex throughout the day.  Pt educated on different types of w/c cushion.  Pt educated on  acute skilled PT services and goals.  Family to bring in LLE prosthetic if pt not discharge home tomorrow.  Pt verbalized good understanding.     Patient left up in w/chair sitting on personal pillow from home with all lines intact, call button in reach, and nurse Anna notified by BREANNA Calhoun.  Joséjayro johnattached.  Tray table close by.     GOALS:   Multidisciplinary Problems       Physical Therapy Goals          Problem: Physical Therapy    Goal Priority Disciplines Outcome Goal Variances Interventions   Physical Therapy Goal     PT, PT/OT Ongoing, Progressing     Description: Goals to be met by: 10/24/23     Patient will increase functional independence with mobility by performin. Supine to sit with Modified [place]  2. Rolling to Left and Right with Modified [place]  3. Sit to stand transfer with Modified [place] using RW  4. Bed to chair transfer with Modified [place] using Rolling Walker  5. Gait x20 feet with Modified [place] using Rolling Walker  6. Wheelchair propulsion x500 feet with Modified [place] using bilateral upper extremities  7. Lower extremity exercise program 2 sets x15 reps per handout, with independence                         History:     Past Medical History:   Diagnosis Date    ADHD (attention deficit hyperactivity disorder)     Arthritis     Asthma     Bipolar 1 disorder     Cataract     Cigarette smoker     COPD (chronic obstructive pulmonary disease)     Coronary artery disease     A fib    Depression     bipolar manic depresson    Diabetes mellitus     Diabetic foot ulcers     Diabetic neuropathy     DVT of lower extremity, bilateral 2013    bilateral LE DVT. Estelita filter placed.     Encounter for blood transfusion     History of blood clots 1. Left Leg=; 2.Bilateral Groin=Blood Clots=  or 2013 & 2013; 3. LLL of Lung=2013;  4. Lt. Lower Leg=2013.     Pt. had 1st Blood Clot after Ostofsqetkfe=3811, & Last=. Estelita  "Filter= Rt.Lateral Neck.    HTN (hypertension) 06/06/2013    Pt states that she does not have hypertension    Hypercholesteremia     Irregular heartbeat     Neuromuscular disorder     neuropathy feet    Obese     PE (pulmonary embolism) 07/2013    bilat LE DVT.     Restless leg syndrome        Past Surgical History:   Procedure Laterality Date    ABDOMINAL SURGERY  2010    gastric sleeve    BELOW KNEE AMPUTATION OF LOWER EXTREMITY Left 4/19/2023    Procedure: AMPUTATION, BELOW KNEE;  Surgeon: Gabe Munoz MD;  Location: [facility] OR;  Service: General;  Laterality: Left;  RN PREOP 4/11/2023    BILATERAL OOPHORECTOMY Bilateral 1/12/2015    CHOLECYSTECTOMY      DEBRIDEMENT OF FOOT Bilateral 5/10/2022    Procedure: DEBRIDEMENT, FOOT;  Surgeon: Maira De Los Santos DPM;  Location: [facility] OR;  Service: Podiatry;  Laterality: Bilateral;    DEBRIDEMENT OF FOOT Left 2/28/2023    Procedure: DEBRIDEMENT, FOOT,biopsy;  Surgeon: Maira De Los Santos DPM;  Location: [facility] OR;  Service: Podiatry;  Laterality: Left;  request misonix, wound VAC, possible neoxx    Green' s filter Right 7/4/2012    Right Neck & Tunneled Down.    HERNIA REPAIR      "[place] of Hernias Repaires around th Belly Button.", pt. states    INCISION AND DRAINAGE FOOT Left 12/24/2022    Procedure: INCISION AND DRAINAGE, FOOT;  Surgeon: Fahad Razo DPM;  Location: [facility] OR;  Service: Podiatry;  Laterality: Left;    LAPAROSCOPIC CHOLECYSTECTOMY N/A 9/10/2020    Procedure: CHOLECYSTECTOMY, LAPAROSCOPIC;  Surgeon: Montrell Gutierrez MD;  Location: [facility] OR;  Service: General;  Laterality: N/A;  RN PREOP 9/9----COVID Negative  9/9    OVARIAN CYST REMOVAL  3/13/2014    UT REMOVAL OF OVARY/TUBE(S)      SPLENECTOMY, TOTAL  July 2003    TONSILLECTOMY      as a child    TYMPANOSTOMY TUBE PLACEMENT  1976    VEIN SURGERY  2003    Lt leg       Time Tracking:     PT Received On: 10/10/23  PT Start Time: 1516     PT Stop Time: 1547  PT Total Time (min): 31 min     Billable Minutes: Evaluation " 16 min and Therapeutic Activity 15 min      10/10/2023

## 2023-10-10 NOTE — ASSESSMENT & PLAN NOTE
Audrey Natarajan is a 53 y.o. lady with a scalp abscess now s/p drainage    - cultures sent off  - change dressing as needed, ok to leave uncovered  - outpatient referral to clinic for scalp cyst removal    Surgery will sign off, pelase call with questions

## 2023-10-10 NOTE — SUBJECTIVE & OBJECTIVE
"Past Medical History:   Diagnosis Date    ADHD (attention deficit hyperactivity disorder)     Arthritis     Asthma     Bipolar 1 disorder     Cataract     Cigarette smoker     COPD (chronic obstructive pulmonary disease)     Coronary artery disease     A fib    Depression     bipolar manic depresson    Diabetes mellitus     Diabetic foot ulcers     Diabetic neuropathy     DVT of lower extremity, bilateral 07/2013    bilateral LE DVT. [place] filter placed.     Encounter for blood transfusion     History of blood clots 1. Left Leg=2003; 2.Bilateral Groin=Blood Clots= 5 or 6/ 2013 & 7/2013; 3. LLL of Lung=7/2013;  4. Lt. Lower Leg=7/2013.     Pt. had 1st Blood Clot after [place]=6019, & Last=2013. [place] Filter= Rt.Lateral Neck.    HTN (hypertension) 06/06/2013    Pt states that she does not have hypertension    Hypercholesteremia     Irregular heartbeat     Neuromuscular disorder     neuropathy feet    Obese     PE (pulmonary embolism) 07/2013    bilat LE DVT.     Restless leg syndrome        Past Surgical History:   Procedure Laterality Date    ABDOMINAL SURGERY  2010    gastric sleeve    BELOW KNEE AMPUTATION OF LOWER EXTREMITY Left 4/19/2023    Procedure: AMPUTATION, BELOW KNEE;  Surgeon: Gabe Munoz MD;  Location: [facility] OR;  Service: General;  Laterality: Left;  RN PREOP 4/11/2023    BILATERAL OOPHORECTOMY Bilateral 1/12/2015    CHOLECYSTECTOMY      DEBRIDEMENT OF FOOT Bilateral 5/10/2022    Procedure: DEBRIDEMENT, FOOT;  Surgeon: Maira De Los Santos DPM;  Location: [facility] OR;  Service: Podiatry;  Laterality: Bilateral;    DEBRIDEMENT OF FOOT Left 2/28/2023    Procedure: DEBRIDEMENT, FOOT,biopsy;  Surgeon: Maira De Los Santos DPM;  Location: [facility] OR;  Service: Podiatry;  Laterality: Left;  request misonix, wound VAC, possible neoxx    Green' s filter Right 7/4/2012    Right Neck & Tunneled Down.    HERNIA REPAIR      "[place] around th [place].", pt. states    INCISION AND DRAINAGE " FOOT Left 12/24/2022    Procedure: INCISION AND DRAINAGE, FOOT;  Surgeon: Fahad Razo DPM;  Location: [facility] OR;  Service: Podiatry;  Laterality: Left;    LAPAROSCOPIC CHOLECYSTECTOMY N/A 9/10/2020    Procedure: CHOLECYSTECTOMY, LAPAROSCOPIC;  Surgeon: Montrell Gutierrez MD;  Location: [facility] OR;  Service: General;  Laterality: N/A;  RN PREOP 9/9----COVID Negative  9/9    OVARIAN CYST REMOVAL  3/13/2014    MS REMOVAL OF OVARY/TUBE(S)      SPLENECTOMY, TOTAL  July 2003    TONSILLECTOMY      as a child    TYMPANOSTOMY TUBE PLACEMENT  1976    VEIN SURGERY  2003    Lt leg       Review of patient's allergies indicates:   Allergen Reactions    Morphine Other (See Comments)     Patient had a psychotic episode after taking Morphine  Agitation, hallucinations  Other Reaction(s): Other (See Comments), Other (See Comments)    Patient had a psychotic episode after taking Morphine    Patient had a psychotic episode after taking Morphine Agitation, hallucinations    Penicillins Anaphylaxis     Tolerated cephalosporins in the past    Januvia [sitagliptin] Hives    Carbamazepine Other (See Comments)     hyponatremia  Other Reaction(s): Other (See Comments)    hyponatremia       No current facility-administered medications on file prior to encounter.     Current Outpatient Medications on File Prior to Encounter   Medication Sig    albuterol (PROVENTIL/VENTOLIN HFA) 90 mcg/actuation inhaler INHALE 2 PUFFS INTO THE LUNGS EVERY 6 HOURS AS NEEDED FOR WHEEZING. RESCUE    albuterol-ipratropium (DUO-NEB) 2.5 mg-0.5 mg/3 mL nebulizer solution Take 3 mLs by nebulization every 6 (six) hours as needed for Wheezing or Shortness of Breath. Rescue    apixaban (ELIQUIS) 5 mg Tab Take 1 tablet (5 mg total) by mouth in the morning and 1 tablet (5 mg total) in the evening. Indications: Thromboembolism secondary to Atrial Fibrillation.    aspirin 81 MG Chew Take 1 tablet (81 mg total) by mouth once daily.    benzonatate (TESSALON) 100 MG capsule Take 1  capsule (100 mg total) by mouth 3 (three) times daily as needed for Cough.    bumetanide (BUMEX) 1 MG tablet Take 1 tablet (1 mg total) by mouth once daily.    divalproex (DEPAKOTE) 500 MG TbEC Take 1 tablet (500 mg total) by mouth once daily. PO QAM (Patient taking differently: Take 500 mg by mouth every evening.)    doxycycline (VIBRAMYCIN) 100 MG Cap Take 1 capsule (100 mg total) by mouth 2 (two) times daily.    ferrous sulfate 325 (65 FE) MG EC tablet Take 1 tablet (325 mg total) by mouth once daily.    fluconazole (DIFLUCAN) 150 MG Tab Take 1 tablet (150 mg total) by mouth once daily. May take second tab po two days after first if symptoms persist    fluticasone propionate (FLONASE) 50 mcg/actuation nasal spray 2 sprays (100 mcg total) by Each Nostril route once daily.    fluticasone-salmeterol diskus inhaler 250-50 mcg Inhale 1 puff into the lungs 2 (two) times daily. Controller    gabapentin (NEURONTIN) 300 MG capsule TAKE 2 CAPSULES(600 MG) BY MOUTH THREE TIMES DAILY    hydrOXYzine HCL (ATARAX) 25 MG tablet Take 1 tablet (25 mg total) by mouth every 6 (six) hours as needed for itching or anxiety.    LIDOcaine (LIDODERM) 5 % Place 1 patch onto the skin once daily. Remove & Discard patch within 12 hours or as directed by MD    lisinopriL 10 MG tablet Take 1 tablet (10 mg total) by mouth once daily.    loratadine (CLARITIN) 10 mg tablet TAKE 1 TABLET BY MOUTH DAILY    metFORMIN (GLUCOPHAGE) 1000 MG tablet TAKE 1 TABLET(1000 MG) BY MOUTH TWICE DAILY WITH MEALS    methocarbamoL (ROBAXIN) 500 MG Tab Take 1 tablet (500 mg total) by mouth 3 (three) times daily. Frequency could not be confirmed.    metoprolol tartrate (LOPRESSOR) 25 MG tablet Take 1 tablet (25 mg total) by mouth 2 (two) times daily.    multivitamin Tab Take 1 tablet by mouth once daily.    nystatin (NYSTOP) powder APPLY TO ABDOMINAL AND BREAST SKIN FOLD TWICE DAILY.    pantoprazole (PROTONIX) 40 MG tablet Take 1 tablet (40 mg total) by mouth once  daily.    pravastatin (PRAVACHOL) 40 MG tablet Take 1 tablet (40 mg total) by mouth every evening.    risperiDONE (RISPERDAL) 3 MG Tab Take 1 tablet (3 mg total) by mouth in the morning and 1 tablet (3 mg total) in the evening. Indications: Mood.    semaglutide (OZEMPIC) 1 mg/dose (2 mg/1.5 mL) PnIj Inject 1 mg into the skin every 7 days.    traMADoL (ULTRAM) 50 mg tablet Take 1 tablet (50 mg total) by mouth every 8 (eight) hours as needed for Pain (foot pain).    vitamin E 1000 UNIT capsule Take 1,000 Units by mouth once daily.    ammonium lactate (LAC-HYDRIN) 12 % lotion APPL Y ONCE TOPICALLY TWICE DAILY FOR 30 DAYS    BIOFREEZE, MENTHOL, TOP Apply topically.    cyanocobalamin (VITAMIN B-12) 1000 MCG tablet Take 100 mcg by mouth once daily.    DUPIXENT  mg/2 mL PnIj Inject into the skin every 14 (fourteen) days.    VYVANSE 40 mg Cap Take 40 mg by mouth once daily.    [DISCONTINUED] diclofenac sodium (VOLTAREN) 1 % Gel Apply 2 g topically 4 (four) times daily as needed (Apply to painful area up to 4 times a day as needed for pain). Apply to painful area 4 times a day as needed for pain    [DISCONTINUED] furosemide (LASIX) 20 MG tablet TAKE 1 TABLET(20 MG) BY MOUTH EVERY DAY    [DISCONTINUED] QUEtiapine (SEROQUEL) 200 MG Tab Take 1 tablet (200 mg total) by mouth before breakfast.     Family History       Problem Relation (Age of Onset)    Cataracts Father    Diabetes Father, Paternal Grandfather    Heart disease Father, Paternal Grandfather    Hypertension Father    No Known Problems Mother, Sister, Brother, Maternal Aunt, Maternal Uncle, Paternal Aunt, Paternal Uncle, Maternal Grandfather    Ovarian cancer Maternal Grandmother, Paternal Grandmother          Tobacco Use    Smoking status: Former     Current packs/day: 0.00     Average packs/day: 0.5 packs/day for 37.0 years (18.5 ttl pk-yrs)     Types: Cigarettes     Start date: 1983     Quit date: 2020     Years since quittin.8    Smokeless  tobacco: Current    Tobacco comments:     Enrolled in the [employer] on 5/3/14 ([facility] Member ID # [id number]). Ambulatory referral to Smoking Cessation Program   Substance and Sexual Activity    Alcohol use: No     Alcohol/week: 0.0 standard drinks of alcohol    Drug use: No    Sexual activity: Yes     Partners: Male     Review of Systems   Constitutional: Negative.    HENT: Negative.     Eyes: Negative.    Respiratory: Negative.     Cardiovascular: Negative.    Gastrointestinal: Negative.    Endocrine: Negative.    Genitourinary: Negative.    Musculoskeletal: Negative.    Skin:  Positive for rash.   Allergic/Immunologic: Negative.    Neurological: Negative.         Eye swelling   Psychiatric/Behavioral: Negative.       Objective:     Vital Signs (Most Recent):  Temp: 98.7 °F (37.1 °C) (10/10/23 0747)  Pulse: 94 (10/10/23 0845)  Resp: 18 (10/10/23 0946)  BP: 129/62 (10/10/23 0747)  SpO2: 95 % (10/10/23 0845) Vital Signs (24h Range):  Temp:  [97.7 °F (36.5 °C)-98.7 °F (37.1 °C)] 98.7 °F (37.1 °C)  Pulse:  [82-94] 94  Resp:  [18-20] 18  SpO2:  [92 %-97 %] 95 %  BP: (122-154)/(56-73) 129/62     Weight: 115.6 kg (254 lb 13.6 oz)  Body mass index is 41.13 kg/m².     Physical Exam  Vitals and nursing note reviewed.   Constitutional:       General: She is not in acute distress.     Appearance: Normal appearance. She is not ill-appearing.   HENT:      Head: Normocephalic and atraumatic.      Nose: Nose normal.      Mouth/Throat:      Mouth: Mucous membranes are moist.   Eyes:      Extraocular Movements: Extraocular movements intact.   Cardiovascular:      Rate and Rhythm: Normal rate.      Pulses: Normal pulses.      Heart sounds: No murmur heard.  Pulmonary:      Effort: Pulmonary effort is normal. No respiratory distress.   Abdominal:      General: Abdomen is flat.      Palpations: Abdomen is soft.      Tenderness: There is no abdominal tenderness.   Musculoskeletal:      Right lower leg: No edema.      Left lower leg:  No edema.      Comments: Left BKA   Skin:     General: Skin is warm.      Capillary Refill: Capillary refill takes less than 2 seconds.      Findings: Rash (On scalp) present.   Neurological:      General: No focal deficit present.      Mental Status: She is alert.      Comments: Periorbital swelling and erythema. No limitation of eye movements. No pain with eye movements.    Psychiatric:         Mood and Affect: Mood normal.                        Significant Labs: All pertinent labs within the past 24 hours have been reviewed.    Significant Imaging: I have reviewed all pertinent imaging results/findings within the past 24 hours.

## 2023-10-10 NOTE — NURSING
Received report care assumed. Patient lying in bed resting, NAD noted. Safety precautions maintained.     [facility]  Nurses Note -- 4 Eyes      10/9/2023       Skin assessed on: Q Shift      [x] No Pressure Injuries Present    []Prevention Measures Documented    [] Yes LDA  for Pressure Injury Previously documented     [] Yes New Pressure Injury Discovered   [] LDA for New Pressure Injury Added      Attending RN:  Elena Mast LPN    Second RN:  Anna Moeller RN

## 2023-10-10 NOTE — ASSESSMENT & PLAN NOTE
54 yo woman, admitted with preseptal cellulitis. Improving on vanc and ceftriaxone. Also with scalp abscess/ infected cyst s/p I+D per gen sx 10/9. Cx growing s aureus.     Recommendations:  -appreciate gen sx assistance    -f/u susceptibilities of s aureus; anticipate will be mrsa     - continue CTX and vancomycin for now.

## 2023-10-10 NOTE — PROGRESS NOTES
[facility] Surg  General Surgery  Progress Note    Subjective:     History of Present Illness:  Audrey Natarajan is a 53 y.o. lady with COPD, DM, HTN, HLD, VTE on eliquis who is admitted with periorbital cellulitis and. She has been on antibiotics and is improving. She was found to have an abscess on her scalp. She's had it for a week and reports associated tenderness. Fluctuance on exam. No fevers or chills. No drainage. General surgery consulted for evaluation of I+D.      Post-Op Info:  * No surgery found *         Interval History: pain improved. No bleeding from scalp incision. WBC within normal limits    Medications:  Continuous Infusions:  Scheduled Meds:   acetaminophen  1,000 mg Oral Q8H    apixaban  5 mg Oral BID    arformoteroL  15 mcg Nebulization BID    aspirin  81 mg Oral Daily    budesonide  0.5 mg Nebulization BID    bumetanide  1 mg Oral Daily    cefTRIAXone (ROCEPHIN) IVPB  2 g Intravenous Q24H    divalproex  500 mg Oral QHS    ferrous sulfate  1 tablet Oral Daily    fluticasone propionate  2 spray Each Nostril Daily    gabapentin  600 mg Oral TID    insulin aspart U-100  15 Units Subcutaneous TIDWM    insulin detemir U-100  30 Units Subcutaneous BID    methocarbamoL  500 mg Oral TID    mupirocin   Nasal BID    pantoprazole  40 mg Oral Daily    polyethylene glycol  17 g Oral Daily    pravastatin  40 mg Oral QHS    risperiDONE  3 mg Oral BID    vancomycin (VANCOCIN) IV (PEDS and ADULTS)  1,500 mg Intravenous Q12H    vitamin E  1,000 Units Oral Daily     PRN Meds:aluminum-magnesium hydroxide-simethicone, bisacodyL, dextromethorphan-guaiFENesin  mg/5 ml, dextrose 10%, dextrose 10%, glucagon (human recombinant), glucose, glucose, hydrOXYzine HCL, influenza, insulin aspart U-100, magnesium oxide, magnesium oxide, melatonin, naloxone, nicotine, ondansetron, oxyCODONE-acetaminophen, pneumoc 13-nasim conj-dip cr(PF), potassium bicarbonate, potassium bicarbonate, potassium  bicarbonate, potassium, sodium phosphates, potassium, sodium phosphates, potassium, sodium phosphates, prochlorperazine, simethicone, sodium chloride 0.9%, traMADoL, Pharmacy to dose Vancomycin consult **AND** vancomycin - pharmacy to dose     Review of patient's allergies indicates:   Allergen Reactions    Morphine Other (See Comments)     Patient had a psychotic episode after taking Morphine  Agitation, hallucinations  Other Reaction(s): Other (See Comments), Other (See Comments)    Patient had a psychotic episode after taking Morphine    Patient had a psychotic episode after taking Morphine Agitation, hallucinations    Penicillins Anaphylaxis     Tolerated cephalosporins in the past    Januvia [sitagliptin] Hives    Carbamazepine Other (See Comments)     hyponatremia  Other Reaction(s): Other (See Comments)    hyponatremia     Objective:     Vital Signs (Most Recent):  Temp: 98.7 °F (37.1 °C) (10/10/23 0747)  Pulse: 94 (10/10/23 0845)  Resp: 18 (10/10/23 0946)  BP: 129/62 (10/10/23 0747)  SpO2: 95 % (10/10/23 0845) Vital Signs (24h Range):  Temp:  [97.7 °F (36.5 °C)-98.7 °F (37.1 °C)] 98.7 °F (37.1 °C)  Pulse:  [82-94] 94  Resp:  [18-20] 18  SpO2:  [92 %-97 %] 95 %  BP: (122-154)/(56-73) 129/62     Weight: 115.6 kg (254 lb 13.6 oz)  Body mass index is 41.13 kg/m².    Intake/Output - Last 3 Shifts         10/08 0700  10/09 0659 10/09 0700  10/10 0659 10/10 0700  10/11 0659    P.O. 120 480 240    IV Piggyback  240     Total Intake(mL/kg) 120 (1) 720 (6.2) 240 (2.1)    Urine (mL/kg/hr) 1700 (0.6) 2550 (0.9) 300 (0.7)    Stool 0      Total Output 1700 2550 300    Net -1580 -1830 -60           Urine Occurrence 1 x      Stool Occurrence 0 x               Physical Exam  Vitals and nursing note reviewed.   Constitutional:       General: She is not in acute distress.  HENT:      Head:      Comments: Incision on scalp, improved erythema. No bleeding or drainage  Pulmonary:      Effort: Pulmonary effort is normal.    Abdominal:      General: There is no distension.      Palpations: Abdomen is soft.      Tenderness: There is no abdominal tenderness.   Skin:     General: Skin is warm and dry.   Neurological:      General: No focal deficit present.      Mental Status: She is alert and oriented to person, place, and time.          Significant Labs:  I have reviewed all pertinent lab results within the past 24 hours.  CBC:   Recent Labs   Lab 10/10/23  0316   WBC 11.78   RBC 4.34   HGB 9.6*   HCT 33.4*   *   MCV 77*   MCH 22.1*   MCHC 28.7*     CMP:   Recent Labs   Lab 10/10/23  0316   *   CALCIUM 8.6*   ALBUMIN 2.4*   PROT 5.6*      K 4.0   CO2 31*   CL 96   BUN 11   CREATININE 0.7   ALKPHOS 79   ALT 11   AST 12   BILITOT 0.1       Significant Diagnostics:  I have reviewed all pertinent imaging results/findings within the past 24 hours.    Assessment/Plan:     Domenica Natarajan is a 53 y.o. lady with a scalp abscess now s/p drainage    - cultures sent off  - change dressing as needed, ok to leave uncovered  - outpatient referral to clinic for scalp cyst removal    Surgery will sign off, pelase call with questions        Tahir Acuña MD  General Surgery  [facility]

## 2023-10-10 NOTE — PLAN OF CARE
Plan for patient discharge with home health nurse. Continue to wean O2. Cultures pending. CM to continue to follow for dc needs.    10/10/23 105   Discharge Reassessment   Assessment Type Discharge Planning Reassessment   Did the patient's condition or plan change since previous assessment? No   Discharge Plan discussed with: Patient   Communicated HUMBERTO with patient/caregiver Date not available/Unable to determine   Discharge Plan A Home   Discharge Plan B Home   DME Needed Upon Discharge  none   Transition of Care Barriers None   Why the patient remains in the hospital Requires continued medical care   Post-Acute Status   Post-Acute Authorization Home Health   Home Health Status Pending medical clearance/testing   Discharge Delays None known at this time

## 2023-10-10 NOTE — SUBJECTIVE & OBJECTIVE
Interval History: Remained afebrile overnight. S/p I+D scalp abscess. Still having intermittent headaches.     Review of Systems   Constitutional:  Negative for chills and fever.   HENT:  Positive for facial swelling. Negative for mouth sores, rhinorrhea and sinus pain.    Eyes:  Negative for pain and visual disturbance.   Skin:  Positive for color change.   Neurological:  Positive for headaches.   All other systems reviewed and are negative.    Objective:     Vital Signs (Most Recent):  Temp: 98.6 °F (37 °C) (10/10/23 1110)  Pulse: 86 (10/10/23 1110)  Resp: 20 (10/10/23 1110)  BP: (!) 182/81 (10/10/23 1110)  SpO2: 95 % (10/10/23 1110) Vital Signs (24h Range):  Temp:  [97.7 °F (36.5 °C)-98.7 °F (37.1 °C)] 98.6 °F (37 °C)  Pulse:  [82-94] 86  Resp:  [18-20] 20  SpO2:  [92 %-97 %] 95 %  BP: (122-182)/(59-81) 182/81     Weight: 115.6 kg (254 lb 13.6 oz)  Body mass index is 41.13 kg/m².    Estimated Creatinine Clearance: 122.8 mL/min (based on SCr of 0.7 mg/dL).     Physical Exam  Vitals reviewed.   Constitutional:       General: She is not in acute distress.     Appearance: Normal appearance. She is well-developed.   HENT:      Head: Normocephalic and atraumatic.      Comments: S/p I+D scalp cyst/ abscess. Erythematous.   Eyes:      Conjunctiva/sclera: Conjunctivae normal.      Pupils: Pupils are equal, round, and reactive to light.      Comments: L eye- minimal edema inferior to eye. No pain with eye movement.    Cardiovascular:      Rate and Rhythm: Normal rate and regular rhythm.      Heart sounds: Normal heart sounds.   Pulmonary:      Effort: Pulmonary effort is normal. No respiratory distress.      Breath sounds: Normal breath sounds.   Abdominal:      General: Bowel sounds are normal. There is no distension.      Palpations: Abdomen is soft.   Musculoskeletal:         General: Normal range of motion.      Cervical back: Normal range of motion and neck supple.   Skin:     General: Skin is warm and dry.    Neurological:      General: No focal deficit present.      Mental Status: She is alert and oriented to person, place, and time.      Cranial Nerves: No cranial nerve deficit.   Psychiatric:         Mood and Affect: Mood normal.         Behavior: Behavior normal.          Significant Labs: Blood Culture:   Recent Labs   Lab 05/15/23  2313 05/15/23  2321 10/06/23  1628 10/06/23  1638   LABBLOO No Growth after 4 days. No Growth after 4 days. No Growth to date  No Growth to date  No Growth to date  No Growth to date No Growth to date  No Growth to date  No Growth to date  No Growth to date       All pertinent labs within the past 24 hours have been reviewed.    Significant Imaging: I have reviewed all pertinent imaging results/findings within the past 24 hours.

## 2023-10-10 NOTE — PROGRESS NOTES
[facility] Medicine  Progress Note    Patient Name: Audrey Natarajan  MRN: [id number]  Patient Class: IP- Inpatient   Admission Date: 10/6/2023  Length of Stay: 4 days  Attending Physician: Abbey Germain, *  Primary Care Provider: Donaldo Pena MD        Subjective:     Principal Problem:Preseptal cellulitis        HPI:  This is a 53-year-old female with a past medical history of COPD, type 2 diabetes, hypertension, hyperlipidemia, PAD, bipolar disorder, VTE (on Eliquis), tobacco use, s/p left BKA who presents with eye swelling.      Patient presents with left eye swelling that was noted a day prior to presentation.  She initially had an eczematous rash on her scalp that started 4 days prior.  It was pruritic and painful like someone tugging on her hair.  She reports scratching at it and that it was progressively getting worse.  She later developed headache and right-sided glandular swelling.  On the night prior to presentation, she noted developing left eye swelling with some mild pressure.  She denied having limitation of eye movements, pain with eye movements, decreased vision or double vision. She recently finished a steroid course on 10/3.    In the ED, the patient was hemodynamically stable.  Per ED provider, she had normal intra-ocular pressures.  Labs remarkable for leukocytosis (15.7), elevated lactic acid (6.5 > 4.5), elevated anion gap (17), hyperglycemia (370).  CT head/orbit showed left periorbital soft tissue swelling and edema (periorbital/preseptal cellulitis in the right clinical setting with no intraorbital extension or other acute orbital abnormalities identified).  She was given 1 L of LR, vancomycin, ceftriaxone, Protonix 40 mg IV, Zofran 4 mg IV, and Toradol 10 mg IV.  Patient was admitted for further management.      Overview/Hospital Course:  Patient admitted with preseptal cellulitis of the left eye.  Started on vancomycin and ceftriaxone.  Id consult id  given proximity to eye, patient has ulcers, suspicious for MRSA per ID.  Continuing vanc plus ceftriaxone for now.  Patient's glucoses have been in the 400s, patient is not on insulin at home.  Unclear if this is a sudden rise in glucose due to infection, or if she has been unknowingly having these high glucoses at home which has resulted in infection.  Will start insulin.    A1c came back at 11.6 and high insulin requirement so far, will need to go home with new scripts for insulin and dm supplies.  WBC count normalized today, and face looking much better.  Up to 60 units long-acting insulin and 45 units short-acting +SSI, and glucose still elevated.  Patient did finish a steroid pack a few days before admission and is likely interfered with her A1c, however patient showing no signs of not needing insulin at this point.  Culture after I&D on scalp abscess is pending.already  on IV Abx.      Past Medical History:   Diagnosis Date    ADHD (attention deficit hyperactivity disorder)     Arthritis     Asthma     Bipolar 1 disorder     Cataract     Cigarette smoker     COPD (chronic obstructive pulmonary disease)     Coronary artery disease     A fib    Depression     bipolar manic depresson    Diabetes mellitus     Diabetic foot ulcers     Diabetic neuropathy     DVT of lower extremity, bilateral 07/2013    bilateral LE DVT. [place] filter placed.     Encounter for blood transfusion     History of blood clots 1. Left Leg=2003; 2.Bilateral Groin=Blood Clots= 5 or 6/ 2013 & 7/2013; 3. LLL of Lung=7/2013;  4. Lt. Lower Leg=7/2013.     Pt. had 1st Blood Clot after Yxnlvfqxmpor=7501, & Last=2013. Estelita Filter= Rt.Lateral Neck.    HTN (hypertension) 06/06/2013    Pt states that she does not have hypertension    Hypercholesteremia     Irregular heartbeat     Neuromuscular disorder     neuropathy feet    Obese     PE (pulmonary embolism) 07/2013    bilat LE DVT.     Restless leg syndrome   "      Past Surgical History:   Procedure Laterality Date    ABDOMINAL SURGERY  2010    gastric sleeve    BELOW KNEE AMPUTATION OF LOWER EXTREMITY Left 4/19/2023    Procedure: AMPUTATION, BELOW KNEE;  Surgeon: Gabe Munoz MD;  Location: [facility] OR;  Service: General;  Laterality: Left;  RN PREOP 4/11/2023    BILATERAL OOPHORECTOMY Bilateral 1/12/2015    CHOLECYSTECTOMY      DEBRIDEMENT OF FOOT Bilateral 5/10/2022    Procedure: DEBRIDEMENT, FOOT;  Surgeon: Maira De Los Santos DPM;  Location: [facility] OR;  Service: Podiatry;  Laterality: Bilateral;    DEBRIDEMENT OF FOOT Left 2/28/2023    Procedure: DEBRIDEMENT, FOOT,biopsy;  Surgeon: Maira De Los Santos DPM;  Location: [facility] OR;  Service: Podiatry;  Laterality: Left;  request misonix, wound VAC, possible neoxx    Green' s filter Right 7/4/2012    Right Neck & Tunneled Down.    HERNIA REPAIR      "Homer of Hernias Repaires around th Belly Button.", pt. states    INCISION AND DRAINAGE FOOT Left 12/24/2022    Procedure: INCISION AND DRAINAGE, FOOT;  Surgeon: Fahad Razo DPM;  Location: [facility] OR;  Service: Podiatry;  Laterality: Left;    LAPAROSCOPIC CHOLECYSTECTOMY N/A 9/10/2020    Procedure: CHOLECYSTECTOMY, LAPAROSCOPIC;  Surgeon: Montrell Gutierrez MD;  Location: [facility] OR;  Service: General;  Laterality: N/A;  RN PREOP 9/9----COVID Negative  9/9    OVARIAN CYST REMOVAL  3/13/2014    NV REMOVAL OF OVARY/TUBE(S)      SPLENECTOMY, TOTAL  July 2003    TONSILLECTOMY      as a child    TYMPANOSTOMY TUBE PLACEMENT  1976    VEIN SURGERY  2003    Lt leg       Review of patient's allergies indicates:   Allergen Reactions    Morphine Other (See Comments)     Patient had a psychotic episode after taking Morphine  Agitation, hallucinations  Other Reaction(s): Other (See Comments), Other (See Comments)    Patient had a psychotic episode after taking Morphine    Patient had a psychotic episode after taking Morphine Agitation, hallucinations    Penicillins Anaphylaxis     " Tolerated cephalosporins in the past    Januvia [sitagliptin] Hives    Carbamazepine Other (See Comments)     hyponatremia  Other Reaction(s): Other (See Comments)    hyponatremia       No current facility-administered medications on file prior to encounter.     Current Outpatient Medications on File Prior to Encounter   Medication Sig    albuterol (PROVENTIL/VENTOLIN HFA) 90 mcg/actuation inhaler INHALE 2 PUFFS INTO THE LUNGS EVERY 6 HOURS AS NEEDED FOR WHEEZING. RESCUE    albuterol-ipratropium (DUO-NEB) 2.5 mg-0.5 mg/3 mL nebulizer solution Take 3 mLs by nebulization every 6 (six) hours as needed for Wheezing or Shortness of Breath. Rescue    apixaban (ELIQUIS) 5 mg Tab Take 1 tablet (5 mg total) by mouth in the morning and 1 tablet (5 mg total) in the evening. Indications: Thromboembolism secondary to Atrial Fibrillation.    aspirin 81 MG Chew Take 1 tablet (81 mg total) by mouth once daily.    benzonatate (TESSALON) 100 MG capsule Take 1 capsule (100 mg total) by mouth 3 (three) times daily as needed for Cough.    bumetanide (BUMEX) 1 MG tablet Take 1 tablet (1 mg total) by mouth once daily.    divalproex (DEPAKOTE) 500 MG TbEC Take 1 tablet (500 mg total) by mouth once daily. PO QAM (Patient taking differently: Take 500 mg by mouth every evening.)    doxycycline (VIBRAMYCIN) 100 MG Cap Take 1 capsule (100 mg total) by mouth 2 (two) times daily.    ferrous sulfate 325 (65 FE) MG EC tablet Take 1 tablet (325 mg total) by mouth once daily.    fluconazole (DIFLUCAN) 150 MG Tab Take 1 tablet (150 mg total) by mouth once daily. May take second tab po two days after first if symptoms persist    fluticasone propionate (FLONASE) 50 mcg/actuation nasal spray 2 sprays (100 mcg total) by Each Nostril route once daily.    fluticasone-salmeterol diskus inhaler 250-50 mcg Inhale 1 puff into the lungs 2 (two) times daily. Controller    gabapentin (NEURONTIN) 300 MG capsule TAKE 2 CAPSULES(600 MG) BY MOUTH THREE  TIMES DAILY    hydrOXYzine HCL (ATARAX) 25 MG tablet Take 1 tablet (25 mg total) by mouth every 6 (six) hours as needed for itching or anxiety.    LIDOcaine (LIDODERM) 5 % Place 1 patch onto the skin once daily. Remove & Discard patch within 12 hours or as directed by MD    lisinopriL 10 MG tablet Take 1 tablet (10 mg total) by mouth once daily.    loratadine (CLARITIN) 10 mg tablet TAKE 1 TABLET BY MOUTH DAILY    metFORMIN (GLUCOPHAGE) 1000 MG tablet TAKE 1 TABLET(1000 MG) BY MOUTH TWICE DAILY WITH MEALS    methocarbamoL (ROBAXIN) 500 MG Tab Take 1 tablet (500 mg total) by mouth 3 (three) times daily. Frequency could not be confirmed.    metoprolol tartrate (LOPRESSOR) 25 MG tablet Take 1 tablet (25 mg total) by mouth 2 (two) times daily.    multivitamin Tab Take 1 tablet by mouth once daily.    nystatin (NYSTOP) powder APPLY TO ABDOMINAL AND BREAST SKIN FOLD TWICE DAILY.    pantoprazole (PROTONIX) 40 MG tablet Take 1 tablet (40 mg total) by mouth once daily.    pravastatin (PRAVACHOL) 40 MG tablet Take 1 tablet (40 mg total) by mouth every evening.    risperiDONE (RISPERDAL) 3 MG Tab Take 1 tablet (3 mg total) by mouth in the morning and 1 tablet (3 mg total) in the evening. Indications: Mood.    semaglutide (OZEMPIC) 1 mg/dose (2 mg/1.5 mL) PnIj Inject 1 mg into the skin every 7 days.    traMADoL (ULTRAM) 50 mg tablet Take 1 tablet (50 mg total) by mouth every 8 (eight) hours as needed for Pain (foot pain).    vitamin E 1000 UNIT capsule Take 1,000 Units by mouth once daily.    ammonium lactate (LAC-HYDRIN) 12 % lotion APPL Y ONCE TOPICALLY TWICE DAILY FOR 30 DAYS    BIOFREEZE, MENTHOL, TOP Apply topically.    cyanocobalamin (VITAMIN B-12) 1000 MCG tablet Take 100 mcg by mouth once daily.    DUPIXENT  mg/2 mL PnIj Inject into the skin every 14 (fourteen) days.    VYVANSE 40 mg Cap Take 40 mg by mouth once daily.    [DISCONTINUED] diclofenac sodium (VOLTAREN) 1 % Gel Apply 2 g topically  4 (four) times daily as needed (Apply to painful area up to 4 times a day as needed for pain). Apply to painful area 4 times a day as needed for pain    [DISCONTINUED] furosemide (LASIX) 20 MG tablet TAKE 1 TABLET(20 MG) BY MOUTH EVERY DAY    [DISCONTINUED] QUEtiapine (SEROQUEL) 200 MG Tab Take 1 tablet (200 mg total) by mouth before breakfast.     Family History       Problem Relation (Age of Onset)    Cataracts Father    Diabetes Father, Paternal Grandfather    Heart disease Father, Paternal Grandfather    Hypertension Father    No Known Problems Mother, Sister, Brother, Maternal Aunt, Maternal Uncle, Paternal Aunt, Paternal Uncle, Maternal Grandfather    Ovarian cancer Maternal Grandmother, Paternal Grandmother          Tobacco Use    Smoking status: Former     Current packs/day: 0.00     Average packs/day: 0.5 packs/day for 37.0 years (18.5 ttl pk-yrs)     Types: Cigarettes     Start date: 1983     Quit date: 2020     Years since quittin.8    Smokeless tobacco: Current    Tobacco comments:     Enrolled in the [employer] on 5/3/14 ([facility] Member ID # [id number]). Ambulatory referral to Smoking Cessation Program   Substance and Sexual Activity    Alcohol use: No     Alcohol/week: 0.0 standard drinks of alcohol    Drug use: No    Sexual activity: Yes     Partners: Male     Review of Systems   Constitutional: Negative.    HENT: Negative.     Eyes: Negative.    Respiratory: Negative.     Cardiovascular: Negative.    Gastrointestinal: Negative.    Endocrine: Negative.    Genitourinary: Negative.    Musculoskeletal: Negative.    Skin:  Positive for rash.   Allergic/Immunologic: Negative.    Neurological: Negative.         Eye swelling   Psychiatric/Behavioral: Negative.       Objective:     Vital Signs (Most Recent):  Temp: 98.7 °F (37.1 °C) (10/10/23 0747)  Pulse: 94 (10/10/23 0845)  Resp: 18 (10/10/23 0946)  BP: 129/62 (10/10/23 0747)  SpO2: 95 % (10/10/23 0845) Vital Signs (24h Range):  Temp:   [97.7 °F (36.5 °C)-98.7 °F (37.1 °C)] 98.7 °F (37.1 °C)  Pulse:  [82-94] 94  Resp:  [18-20] 18  SpO2:  [92 %-97 %] 95 %  BP: (122-154)/(56-73) 129/62     Weight: 115.6 kg (254 lb 13.6 oz)  Body mass index is 41.13 kg/m².     Physical Exam  Vitals and nursing note reviewed.   Constitutional:       General: She is not in acute distress.     Appearance: Normal appearance. She is not ill-appearing.   HENT:      Head: Normocephalic and atraumatic.      Nose: Nose normal.      Mouth/Throat:      Mouth: Mucous membranes are moist.   Eyes:      Extraocular Movements: Extraocular movements intact.   Cardiovascular:      Rate and Rhythm: Normal rate.      Pulses: Normal pulses.      Heart sounds: No murmur heard.  Pulmonary:      Effort: Pulmonary effort is normal. No respiratory distress.   Abdominal:      General: Abdomen is flat.      Palpations: Abdomen is soft.      Tenderness: There is no abdominal tenderness.   Musculoskeletal:      Right lower leg: No edema.      Left lower leg: No edema.      Comments: Left BKA   Skin:     General: Skin is warm.      Capillary Refill: Capillary refill takes less than 2 seconds.      Findings: Rash (On scalp) present.   Neurological:      General: No focal deficit present.      Mental Status: She is alert.      Comments: Periorbital swelling and erythema. No limitation of eye movements. No pain with eye movements.    Psychiatric:         Mood and Affect: Mood normal.                        Significant Labs: All pertinent labs within the past 24 hours have been reviewed.    Significant Imaging: I have reviewed all pertinent imaging results/findings within the past 24 hours.      Assessment/Plan:      * Preseptal cellulitis  CT head/orbit showed left periorbital soft tissue swelling and edema (periorbital/preseptal cellulitis in the right clinical setting with no intraorbital extension or other acute orbital abnormalities identified).   Doubt orbital cellulitis   Continue  ceftriaxone/vancomycin   F/U cultures   ID consulted given near eye involvement    Hyperlipidemia  Resume home medications       Essential hypertension  Resume home medications       Abscess of scalp    Culture after I&D on scalp abscess is pending.already  on IV Abx.    Elevated lactic acid level  Likely in the setting of infection/metformin use. Monitor     PAD (peripheral artery disease)  No acute issues. Resume home medications       Chronic deep vein thrombosis (DVT) of both lower extremities  Resume Eliquis       SHAWN (obstructive sleep apnea)  Not on CPAP   Outpatient follow up with sleep medicine       History of pulmonary embolism  Resume Eliquis      Type 2 diabetes mellitus  Patient's FSGs are controlled on current medication regimen.  Last A1c reviewed-   Lab Results   Component Value Date    HGBA1C 11.6 (H) 10/07/2023     Most recent fingerstick glucose reviewed-   Recent Labs   Lab 10/08/23  1653 10/08/23  1941 10/09/23  0743   POCTGLUCOSE 286* 385* 230*     Current correctional scale  Low  Maintain anti-hyperglycemic dose as follows-   Antihyperglycemics (From admission, onward)    Start     Stop Route Frequency Ordered    10/09/23 0900  insulin detemir U-100 (Levemir) pen 30 Units         -- SubQ 2 times daily 10/09/23 0736    10/09/23 0745  insulin aspart U-100 pen 15 Units         -- SubQ 3 times daily with meals 10/09/23 0736    10/07/23 0806  insulin aspart U-100 pen 0-10 Units         -- SubQ Before meals & nightly PRN 10/07/23 0707        Hold Oral hypoglycemics while patient is in the hospital.  Glucose going into the 400s, will start insulin  A1c's have been great outpt.. unclear if this 2/2 infection or infection is due to suddenly poorly controlled glucose  Up to 60 units long-acting insulin and 45 units short-acting +SSI, and glucose still elevated.   Patient did finish a steroid pack a few days before admission and is likely interfered with her A1c, however patient showing no signs of not  needing insulin at this point.    Bipolar 1 disorder  Resume home medications       Tobacco abuse  Nicotine patch PRN    COPD (chronic obstructive pulmonary disease)  No acute issues. PRN Duo-Nebs      VTE Risk Mitigation (From admission, onward)         Ordered     apixaban tablet 5 mg  2 times daily         10/06/23 2033     IP VTE HIGH RISK PATIENT  Once         10/06/23 2033     Place sequential compression device  Until discontinued         10/06/23 2033                Discharge Planning   HUMBERTO: 10/11/2023     Code Status: Full Code   Is the patient medically ready for discharge?:     Reason for patient still in hospital (select all that apply): Patient trending condition  Discharge Plan A: Home   Discharge Delays: None known at this time              Abbey Germain MD  Department of Hospital Medicine   [facility] [place] [facility]

## 2023-10-10 NOTE — PLAN OF CARE
Problem: Occupational Therapy  Goal: Occupational Therapy Goal  Description: Goals to be met by: 10/24/23     Patient will increase functional independence with ADLs by performing:    LE Dressing with Modified [place] and use of AE as needed.  Grooming while seated with Modified [place].  Toileting from bedside commode with Modified [place] for hygiene and clothing management.   Stand pivot transfers with Modified [place].  Step transfer with Modified [place]  Toilet transfer to bedside commode with Modified [place].  Upper extremity exercise program x15 reps per handout, with independence.    Outcome: Ongoing, Progressing

## 2023-10-10 NOTE — PROGRESS NOTES
Pharmacokinetic Assessment Follow Up: IV Vancomycin    Vancomycin serum concentration assessment(s):    The trough level was drawn correctly and can be used to guide therapy at this time. The measurement is within the desired definitive target range of 10 to 20 mcg/mL.    Vancomycin Regimen Plan:    Continue regimen to Vancomycin 1500 mg IV every 12 hours with next serum trough concentration measured at 2000 prior to 4th dose on 10/11/2023    Drug levels (last 3 results):  Recent Labs   Lab Result Units 10/08/23  0800 10/10/23  0801   Vancomycin-Trough ug/mL 13.9 15.3       Pharmacy will continue to follow and monitor vancomycin.    Please contact pharmacy at extension [phone] for questions regarding this assessment.    Thank you for the consult,   Ja Carroll Jr       Patient brief summary:  Audrey Natarajan is a 53 y.o. female initiated on antimicrobial therapy with IV Vancomycin for treatment of skin & soft tissue infection    Drug Allergies:   Review of patient's allergies indicates:   Allergen Reactions    Morphine Other (See Comments)     Patient had a psychotic episode after taking Morphine  Agitation, hallucinations  Other Reaction(s): Other (See Comments), Other (See Comments)    Patient had a psychotic episode after taking Morphine    Patient had a psychotic episode after taking Morphine Agitation, hallucinations    Penicillins Anaphylaxis     Tolerated cephalosporins in the past    Januvia [sitagliptin] Hives    Carbamazepine Other (See Comments)     hyponatremia  Other Reaction(s): Other (See Comments)    hyponatremia       Actual Body Weight:   115.6 kg    Renal Function:   Estimated Creatinine Clearance: 122.8 mL/min (based on SCr of 0.7 mg/dL).,     Dialysis Method (if applicable):  N/A    CBC (last 72 hours):  Recent Labs   Lab Result Units 10/08/23  0800 10/09/23  0411 10/10/23  0316   WBC K/uL 14.54* 12.62 11.78   Hemoglobin g/dL 10.8* 10.0* 9.6*   Hematocrit % 36.7* 34.4* 33.4*   Platelets  K/uL 428 492* 529*   Gran % % 50.5 43.8 42.8   Lymph % % 31.4 37.8 36.1   Mono % % 12.6 11.3 12.1   Eosinophil % % 3.9 4.4 5.3   Basophil % % 0.8 0.9 1.2   Differential Method  Automated Automated Automated       Metabolic Panel (last 72 hours):  Recent Labs   Lab Result Units 10/07/23  1232 10/08/23  0800 10/09/23  0411 10/10/23  0316   Sodium mmol/L  --  139 137 138   Potassium mmol/L  --  3.8 3.7 4.0   Chloride mmol/L  --  100 97 96   CO2 mmol/L  --  29 34* 31*   Glucose mg/dL 474* 283* 312* 315*   BUN mg/dL  --  9 14 11   Creatinine mg/dL  --  0.7 0.7 0.7   Albumin g/dL  --  2.7* 2.5* 2.4*   Total Bilirubin mg/dL  --  0.2 0.1 0.1   Alkaline Phosphatase U/L  --  85 77 79   AST U/L  --  11 11 12   ALT U/L  --  10 12 11   Magnesium mg/dL  --  1.7 1.7 1.8   Phosphorus mg/dL  --  4.5 4.4 4.0       Vancomycin Administrations:  vancomycin given in the last 96 hours                     vancomycin 1,500 mg in dextrose 5 % (D5W) 250 mL IVPB (Vial-Mate) (mg) 1,500 mg New Bag 10/10/23 0919     1,500 mg New Bag 10/09/23 2055     1,500 mg New Bag  0948     1,500 mg New Bag 10/08/23 2105     1,500 mg New Bag  0822     1,500 mg New Bag 10/07/23 2050      Restarted  0547     1,500 mg New Bag  0541    vancomycin (VANCOCIN) 2,250 mg in dextrose 5 % (D5W) 500 mL IVPB ()  Restarted 10/06/23 1823     2,250 mg New Bag  1753                    Microbiologic Results:  Microbiology Results (last 7 days)       Procedure Component Value Units Date/Time    Aerobic culture [[id number]]  (Abnormal) Collected: 10/09/23 1414    Order Status: Completed Specimen: Abscess from Head Updated: 10/10/23 0837     Aerobic Bacterial Culture STAPHYLOCOCCUS AUREUS  Moderate  Susceptibility pending      Nasal culture [[id number]] Collected: 10/07/23 1305    Order Status: Completed Specimen: Nasal Swab from Nose Updated: 10/10/23 0742     RESPIRATORY CULTURE - NASAL Further report to follow    Narrative:      R/o MRSA carriage    Blood culture #2 **CANNOT BE  ORDERED STAT** [[id number]] Collected: 10/06/23 1628    Order Status: Completed Specimen: Blood from Peripheral, Antecubital, Right Updated: 10/09/23 1703     Blood Culture, Routine No Growth to date      No Growth to date      No Growth to date      No Growth to date    Blood culture #1 **CANNOT BE ORDERED STAT** [[id number]] Collected: 10/06/23 1638    Order Status: Completed Specimen: Blood from Peripheral, Hand, Right Updated: 10/09/23 1703     Blood Culture, Routine No Growth to date      No Growth to date      No Growth to date      No Growth to date    Culture, Anaerobe [[id number]] Collected: 10/09/23 1414    Order Status: Sent Specimen: Abscess from Head Updated: 10/09/23 1421    Gram stain [[id number]] Collected: 10/09/23 1414    Order Status: Sent Specimen: Abscess from Head Updated: 10/09/23 1421

## 2023-10-10 NOTE — NURSING
[facility]  Nurses Note -- 4 Eyes      10/10/2023       Skin assessed on: Q Shift      [x] No Pressure Injuries Present    [x]Prevention Measures Documented    [] Yes LDA  for Pressure Injury Previously documented     [] Yes New Pressure Injury Discovered   [] LDA for New Pressure Injury Added      Attending RN:  Anna Moeller RN     Second RN:  Elena Mast RN

## 2023-10-10 NOTE — PROGRESS NOTES
"[facility] S[facility]  Infectious Disease  Progress Note    Patient Name: Audrey Natarajan  MRN: [id number]  Admission Date: 10/6/2023  Length of Stay: 4 days  Attending Physician: Abbey Germain, *  Primary Care Provider: Donaldo Pena MD    Isolation Status: No active isolations  Assessment/Plan:      ID  * Preseptal cellulitis  54 yo woman, admitted with preseptal cellulitis. Improving on vanc and ceftriaxone. Also with scalp abscess/ infected cyst s/p I+D per gen sx 10/9. Cx growing s aureus.     Recommendations:  -appreciate gen sx assistance    -f/u susceptibilities of s aureus; anticipate will be mrsa     - continue CTX and vancomycin for now.       scalp abscess  --see above  Anticipated Disposition: tbd    Thank you for your consult. I will follow-up with patient. Please contact us if you have any additional questions.    Nida Salazar MD  Infectious Disease  [facility] Surg    Subjective:     Principal Problem:Preseptal cellulitis    HPI: 54 yo woman admitted with preseptal cellulitis of the left eye. The patient has a remote history of "boils" and recently developed URI symptoms and swelling of her right face. She tells me that she kept touching the area (she is an admitted "") and subsequently developed "sores" on her head and left forehead. When her eye started to swell, she came to the ED and was admitted. The patient underwent CT imaging which confirmed that diagnosis. She was started on empiric bx after blood cultures were obtained and ID is consulted.    Interval History: Remained afebrile overnight. S/p I+D scalp abscess. Still having intermittent headaches.     Review of Systems   Constitutional:  Negative for chills and fever.   HENT:  Positive for facial swelling. Negative for mouth sores, rhinorrhea and sinus pain.    Eyes:  Negative for pain and visual disturbance.   Skin:  Positive for color change.   Neurological:  Positive for headaches.   All other " systems reviewed and are negative.    Objective:     Vital Signs (Most Recent):  Temp: 98.6 °F (37 °C) (10/10/23 1110)  Pulse: 86 (10/10/23 1110)  Resp: 20 (10/10/23 1110)  BP: (!) 182/81 (10/10/23 1110)  SpO2: 95 % (10/10/23 1110) Vital Signs (24h Range):  Temp:  [97.7 °F (36.5 °C)-98.7 °F (37.1 °C)] 98.6 °F (37 °C)  Pulse:  [82-94] 86  Resp:  [18-20] 20  SpO2:  [92 %-97 %] 95 %  BP: (122-182)/(59-81) 182/81     Weight: 115.6 kg (254 lb 13.6 oz)  Body mass index is 41.13 kg/m².    Estimated Creatinine Clearance: 122.8 mL/min (based on SCr of 0.7 mg/dL).     Physical Exam  Vitals reviewed.   Constitutional:       General: She is not in acute distress.     Appearance: Normal appearance. She is well-developed.   HENT:      Head: Normocephalic and atraumatic.      Comments: S/p I+D scalp cyst/ abscess. Erythematous.   Eyes:      Conjunctiva/sclera: Conjunctivae normal.      Pupils: Pupils are equal, round, and reactive to light.      Comments: L eye- minimal edema inferior to eye. No pain with eye movement.    Cardiovascular:      Rate and Rhythm: Normal rate and regular rhythm.      Heart sounds: Normal heart sounds.   Pulmonary:      Effort: Pulmonary effort is normal. No respiratory distress.      Breath sounds: Normal breath sounds.   Abdominal:      General: Bowel sounds are normal. There is no distension.      Palpations: Abdomen is soft.   Musculoskeletal:         General: Normal range of motion.      Cervical back: Normal range of motion and neck supple.   Skin:     General: Skin is warm and dry.   Neurological:      General: No focal deficit present.      Mental Status: She is alert and oriented to person, place, and time.      Cranial Nerves: No cranial nerve deficit.   Psychiatric:         Mood and Affect: Mood normal.         Behavior: Behavior normal.          Significant Labs: Blood Culture:   Recent Labs   Lab 05/15/23  2313 05/15/23  2321 10/06/23  1628 10/06/23  1638   LABBLOO No Growth after 4 days.  No Growth after 4 days. No Growth to date  No Growth to date  No Growth to date  No Growth to date No Growth to date  No Growth to date  No Growth to date  No Growth to date       All pertinent labs within the past 24 hours have been reviewed.    Significant Imaging: I have reviewed all pertinent imaging results/findings within the past 24 hours.

## 2023-10-10 NOTE — PLAN OF CARE
Problem: Diabetes Comorbidity  Goal: Blood Glucose Level Within Targeted Range  Outcome: Ongoing, Progressing     Problem: Bariatric Environmental Safety  Goal: Safety Maintained with Care  Outcome: Ongoing, Progressing     Problem: Fall Injury Risk  Goal: Absence of Fall and Fall-Related Injury  Outcome: Ongoing, Progressing     Problem: Skin or Soft Tissue Infection  Goal: Absence of Infection Signs and Symptoms  Outcome: Ongoing, Progressing

## 2023-10-10 NOTE — SUBJECTIVE & OBJECTIVE
Interval History: pain improved. No bleeding from scalp incision. WBC within normal limits    Medications:  Continuous Infusions:  Scheduled Meds:   acetaminophen  1,000 mg Oral Q8H    apixaban  5 mg Oral BID    arformoteroL  15 mcg Nebulization BID    aspirin  81 mg Oral Daily    budesonide  0.5 mg Nebulization BID    bumetanide  1 mg Oral Daily    cefTRIAXone (ROCEPHIN) IVPB  2 g Intravenous Q24H    divalproex  500 mg Oral QHS    ferrous sulfate  1 tablet Oral Daily    fluticasone propionate  2 spray Each Nostril Daily    gabapentin  600 mg Oral TID    insulin aspart U-100  15 Units Subcutaneous TIDWM    insulin detemir U-100  30 Units Subcutaneous BID    methocarbamoL  500 mg Oral TID    mupirocin   Nasal BID    pantoprazole  40 mg Oral Daily    polyethylene glycol  17 g Oral Daily    pravastatin  40 mg Oral QHS    risperiDONE  3 mg Oral BID    vancomycin (VANCOCIN) IV (PEDS and ADULTS)  1,500 mg Intravenous Q12H    vitamin E  1,000 Units Oral Daily     PRN Meds:aluminum-magnesium hydroxide-simethicone, bisacodyL, dextromethorphan-guaiFENesin  mg/5 ml, dextrose 10%, dextrose 10%, glucagon (human recombinant), glucose, glucose, hydrOXYzine HCL, influenza, insulin aspart U-100, magnesium oxide, magnesium oxide, melatonin, naloxone, nicotine, ondansetron, oxyCODONE-acetaminophen, pneumoc 13-nasim conj-dip cr(PF), potassium bicarbonate, potassium bicarbonate, potassium bicarbonate, potassium, sodium phosphates, potassium, sodium phosphates, potassium, sodium phosphates, prochlorperazine, simethicone, sodium chloride 0.9%, traMADoL, Pharmacy to dose Vancomycin consult **AND** vancomycin - pharmacy to dose     Review of patient's allergies indicates:   Allergen Reactions    Morphine Other (See Comments)     Patient had a psychotic episode after taking Morphine  Agitation, hallucinations  Other Reaction(s): Other (See Comments), Other (See Comments)    Patient had a psychotic episode after taking Morphine    Patient  had a psychotic episode after taking Morphine Agitation, hallucinations    Penicillins Anaphylaxis     Tolerated cephalosporins in the past    Januvia [sitagliptin] Hives    Carbamazepine Other (See Comments)     hyponatremia  Other Reaction(s): Other (See Comments)    hyponatremia     Objective:     Vital Signs (Most Recent):  Temp: 98.7 °F (37.1 °C) (10/10/23 0747)  Pulse: 94 (10/10/23 0845)  Resp: 18 (10/10/23 0946)  BP: 129/62 (10/10/23 0747)  SpO2: 95 % (10/10/23 0845) Vital Signs (24h Range):  Temp:  [97.7 °F (36.5 °C)-98.7 °F (37.1 °C)] 98.7 °F (37.1 °C)  Pulse:  [82-94] 94  Resp:  [18-20] 18  SpO2:  [92 %-97 %] 95 %  BP: (122-154)/(56-73) 129/62     Weight: 115.6 kg (254 lb 13.6 oz)  Body mass index is 41.13 kg/m².    Intake/Output - Last 3 Shifts         10/08 0700  10/09 0659 10/09 0700  10/10 0659 10/10 0700  10/11 0659    P.O. 120 480 240    IV Piggyback  240     Total Intake(mL/kg) 120 (1) 720 (6.2) 240 (2.1)    Urine (mL/kg/hr) 1700 (0.6) 2550 (0.9) 300 (0.7)    Stool 0      Total Output 1700 2550 300    Net -1580 -1830 -60           Urine Occurrence 1 x      Stool Occurrence 0 x               Physical Exam  Vitals and nursing note reviewed.   Constitutional:       General: She is not in acute distress.  HENT:      Head:      Comments: Incision on scalp, improved erythema. No bleeding or drainage  Pulmonary:      Effort: Pulmonary effort is normal.   Abdominal:      General: There is no distension.      Palpations: Abdomen is soft.      Tenderness: There is no abdominal tenderness.   Skin:     General: Skin is warm and dry.   Neurological:      General: No focal deficit present.      Mental Status: She is alert and oriented to person, place, and time.          Significant Labs:  I have reviewed all pertinent lab results within the past 24 hours.  CBC:   Recent Labs   Lab 10/10/23  0316   WBC 11.78   RBC 4.34   HGB 9.6*   HCT 33.4*   *   MCV 77*   MCH 22.1*   MCHC 28.7*     CMP:   Recent Labs    Lab 10/10/23  0316   *   CALCIUM 8.6*   ALBUMIN 2.4*   PROT 5.6*      K 4.0   CO2 31*   CL 96   BUN 11   CREATININE 0.7   ALKPHOS 79   ALT 11   AST 12   BILITOT 0.1       Significant Diagnostics:  I have reviewed all pertinent imaging results/findings within the past 24 hours.

## 2023-10-10 NOTE — PT/OT/SLP EVAL
"Occupational Therapy   Evaluation    Name: Audrey Natarajan  MRN: [id number]  Admitting Diagnosis: Preseptal cellulitis  Recent Surgery: * No surgery found *      Recommendations:     Discharge Recommendations:  (Resume Oupatient PT services)  Discharge Equipment Recommendations:  none  Barriers to discharge:  None    Assessment:     Audrey Natarajan is a 53 y.o. female with a medical diagnosis of Preseptal cellulitis. Performance deficits affecting function: weakness, impaired self care skills, impaired endurance, impaired functional mobility, gait instability, impaired balance, decreased upper extremity function, decreased lower extremity function, decreased safety awareness, decreased coordination, pain, decreased ROM, edema, orthopedic precautions.      Rehab Prognosis: Good; patient would benefit from acute skilled OT services to address these deficits and reach maximum level of function.       Plan:     Patient to be seen 3 x/week to address the above listed problems via self-care/home management, therapeutic activities, therapeutic exercises  Plan of Care Expires: 10/24/23  Plan of Care Reviewed with: patient    Subjective     Chief Complaint: "My butt has been hurting in the bed."   Patient/Family Comments/goals: Agreeable to participate     Occupational Profile:  Living Environment: Pt lives alone in a [facility] with 1 OLLIE at entry.   Previous level of function: patients level of function was mod I with ambulation using SPC/rollator and LLE prosthetic.  Pt was driving PTA.  Roles and Routines: Going to Outpatient therapy 1x/wk.   Equipment Used at Home: cane, straight, rollator, wheelchair, bedside commode, shower chair (knee scooter)  Assistance upon Discharge: sister, godmother and close friend, Sadaf     Pain/Comfort:  Pain Rating 1:  (Pt did not rate.)  Location 1:  (headache)  Pain Addressed 1: Cessation of Activity, Reposition    Patients cultural, spiritual, Church conflicts given the current " situation: no    Objective:     Communicated with: Nurse prior to session.  Patient found HOB elevated with telemetry, peripheral IV upon OT entry to room.    General Precautions: Standard, fall, diabetic  Orthopedic Precautions:  (L BKA, but no prosthetic in room)  Braces:  (Owns LLE prosthetic at home)  Respiratory Status: Nasal cannula, flow 2 L/min; spO2 99% on 2L; 84-95% on RA    Occupational Performance:    Bed Mobility:    Patient completed Scooting/Bridging with stand by assistance  Patient completed Supine to Sit with stand by assistance    Functional Mobility/Transfers: Pt set-up W/C in front of her, facing her directly vs. angling it prior transfer to chair. Pt reports this is ow she typically set up her chair.   Patient completed Sit <> Stand Transfer x 1 trial from EOB with contact guard assistance  with  no assistive device  Patient completed Bed <> Chair Transfer using Stand Pivot technique with stand by assistance and contact guard assistance with no assistive device; L knee resting on the w/c in which pt then turned towards her L side to rotate and sit in chair   Functional Mobility: PT entered room to evaluate pt; refer to PT eval as OT did observe pt propelling the W/C in the hallway.     Activities of Daily Living:  Upper Body Dressing: minimum assistance for donning gown over back   Lower Body Dressing: supervision for donning sock in long-sitting     Cognitive/Visual Perceptual:  Cognitive/Psychosocial Skills:     -       Oriented to: Person, Place, Time, and Situation   -       Follows Commands/attention:Follows multistep  commands  -       Communication: clear/fluent  -       Memory: No Deficits noted  -       Safety awareness/insight to disability: intact     Physical Exam:  Balance:    -       good sitting; fair standing   Postural examination/scapula alignment:    -       Rounded shoulders  -       Forward head  Skin integrity: Visible skin intact  Edema:  Mild RLE  Sensation:    -        Intact  Upper Extremity Range of Motion:     -       Right Upper Extremity: WFL  -       Left Upper Extremity: WFL  Upper Extremity Strength:    -       Right Upper Extremity: WFL  -       Left Upper Extremity: WFL   Strength:    -       Right Upper Extremity: WFL  -       Left Upper Extremity: WFL    AMPAC 6 Click ADL:  AMPAC Total Score: 21    Treatment & Education:  -Initial eval complete.   -Pt educated on role of OT and POC.   -Pt educated on safe functional mobility and ADL performance.   -Pt educated on importance of OOB activity w/ assist from staff.     Patient left up in wheelchair with all lines intact and call button in reach dn PT present.     GOALS:   Multidisciplinary Problems       Occupational Therapy Goals          Problem: Occupational Therapy    Goal Priority Disciplines Outcome Interventions   Occupational Therapy Goal     OT, PT/OT Ongoing, Progressing    Description: Goals to be met by: 10/24/23     Patient will increase functional independence with ADLs by performing:    LE Dressing with Modified [place] and use of AE as needed.  Grooming while seated with Modified [place].  Toileting from bedside commode with Modified [place] for hygiene and clothing management.   Stand pivot transfers with Modified [place].  Step transfer with Modified [place]  Toilet transfer to bedside commode with Modified [place].  Upper extremity exercise program x15 reps per handout, with independence.                         History:     Past Medical History:   Diagnosis Date    ADHD (attention deficit hyperactivity disorder)     Arthritis     Asthma     Bipolar 1 disorder     Cataract     Cigarette smoker     COPD (chronic obstructive pulmonary disease)     Coronary artery disease     A fib    Depression     bipolar manic depresson    Diabetes mellitus     Diabetic foot ulcers     Diabetic neuropathy     DVT of lower extremity, bilateral 07/2013    bilateral LE DVT. [place]  "filter placed.     Encounter for blood transfusion     History of blood clots 1. Left Leg=2003; 2.Bilateral Groin=Blood Clots= 5 or 6/ 2013 & 7/2013; 3. LLL of Lung=7/2013;  4. Lt. Lower Leg=7/2013.     Pt. had 1st Blood Clot after Nnwkgdsjhxnd=9195, & Last=2013. [place] Filter= Rt.Lateral Neck.    HTN (hypertension) 06/06/2013    Pt states that she does not have hypertension    Hypercholesteremia     Irregular heartbeat     Neuromuscular disorder     neuropathy feet    Obese     PE (pulmonary embolism) 07/2013    bilat LE DVT.     Restless leg syndrome          Past Surgical History:   Procedure Laterality Date    ABDOMINAL SURGERY  2010    gastric sleeve    BELOW KNEE AMPUTATION OF LOWER EXTREMITY Left 4/19/2023    Procedure: AMPUTATION, BELOW KNEE;  Surgeon: Gabe Munoz MD;  Location: [facility] OR;  Service: General;  Laterality: Left;  RN PREOP 4/11/2023    BILATERAL OOPHORECTOMY Bilateral 1/12/2015    CHOLECYSTECTOMY      DEBRIDEMENT OF FOOT Bilateral 5/10/2022    Procedure: DEBRIDEMENT, FOOT;  Surgeon: Maira De Los Santos DPM;  Location: [facility] OR;  Service: Podiatry;  Laterality: Bilateral;    DEBRIDEMENT OF FOOT Left 2/28/2023    Procedure: DEBRIDEMENT, FOOT,biopsy;  Surgeon: Maira De Los Santos DPM;  Location: [facility] OR;  Service: Podiatry;  Laterality: Left;  request misonix, wound VAC, possible neoxx    Green' s filter Right 7/4/2012    Right Neck & Tunneled Down.    HERNIA REPAIR      "[place] of Hernias Repaires around th Belly Button.", pt. states    INCISION AND DRAINAGE FOOT Left 12/24/2022    Procedure: INCISION AND DRAINAGE, FOOT;  Surgeon: Fahad Razo DPM;  Location: [facility] OR;  Service: Podiatry;  Laterality: Left;    LAPAROSCOPIC CHOLECYSTECTOMY N/A 9/10/2020    Procedure: CHOLECYSTECTOMY, LAPAROSCOPIC;  Surgeon: Montrell Gutierrez MD;  Location: [facility] OR;  Service: General;  Laterality: N/A;  RN PREOP 9/9----COVID Negative  9/9    OVARIAN CYST REMOVAL  3/13/2014    ME REMOVAL OF OVARY/TUBE(S)      " SPLENECTOMY, TOTAL  July 2003    TONSILLECTOMY      as a child    TYMPANOSTOMY TUBE PLACEMENT  1976    VEIN SURGERY  2003    Lt leg       Time Tracking:     OT Date of Treatment: 10/10/23  OT Start Time: 1510  OT Stop Time: 1523  OT Total Time (min): 13 min    Billable Minutes:Evaluation 13  Total Time 13    10/10/2023

## 2023-10-10 NOTE — PLAN OF CARE
Problem: Physical Therapy  Goal: Physical Therapy Goal  Description: Goals to be met by: 10/24/23     Patient will increase functional independence with mobility by performin. Supine to sit with Modified [place]  2. Rolling to Left and Right with Modified [place]  3. Sit to stand transfer with Modified [place] using RW  4. Bed to chair transfer with Modified [place] using Rolling Walker  5. Gait x20 feet with Modified [place] using Rolling Walker  6. Wheelchair propulsion x500 feet with Modified [place] using bilateral upper extremities  7. Lower extremity exercise program 2 sets x15 reps per handout, with independence    Outcome: Ongoing, Progressing     Pt OOB>w/c with CGA.  Gait limited 2* LLE prosthetic at home.

## 2023-10-10 NOTE — PLAN OF CARE
Problem: Diabetes Comorbidity  Goal: Blood Glucose Level Within Targeted Range  Outcome: Ongoing, Progressing     Problem: Bariatric Environmental Safety  Goal: Safety Maintained with Care  Outcome: Ongoing, Progressing     Problem: Fall Injury Risk  Goal: Absence of Fall and Fall-Related Injury  Outcome: Ongoing, Progressing

## 2023-10-11 ENCOUNTER — TELEPHONE (OUTPATIENT)
Dept: SURGERY | Facility: CLINIC | Age: 53
End: 2023-10-11
Payer: MEDICAID

## 2023-10-11 VITALS
RESPIRATION RATE: 18 BRPM | HEIGHT: 66 IN | SYSTOLIC BLOOD PRESSURE: 138 MMHG | HEART RATE: 84 BPM | DIASTOLIC BLOOD PRESSURE: 63 MMHG | BODY MASS INDEX: 40.96 KG/M2 | TEMPERATURE: 98 F | WEIGHT: 254.88 LBS | OXYGEN SATURATION: 93 %

## 2023-10-11 LAB
ALBUMIN SERPL BCP-MCNC: 2.5 G/DL (ref 3.5–5.2)
ALP SERPL-CCNC: 81 U/L (ref 55–135)
ALT SERPL W/O P-5'-P-CCNC: 12 U/L (ref 10–44)
ANION GAP SERPL CALC-SCNC: 8 MMOL/L (ref 8–16)
ANISOCYTOSIS BLD QL SMEAR: ABNORMAL
AST SERPL-CCNC: 17 U/L (ref 10–40)
BACTERIA NPH AEROBE CULT: NORMAL
BACTERIA SPEC AEROBE CULT: ABNORMAL
BASOPHILS # BLD AUTO: 0.14 K/UL (ref 0–0.2)
BASOPHILS NFR BLD: 1.2 % (ref 0–1.9)
BILIRUB SERPL-MCNC: 0.1 MG/DL (ref 0.1–1)
BUN SERPL-MCNC: 12 MG/DL (ref 6–20)
CALCIUM SERPL-MCNC: 9.2 MG/DL (ref 8.7–10.5)
CHLORIDE SERPL-SCNC: 97 MMOL/L (ref 95–110)
CO2 SERPL-SCNC: 32 MMOL/L (ref 23–29)
CREAT SERPL-MCNC: 0.7 MG/DL (ref 0.5–1.4)
DIFFERENTIAL METHOD: ABNORMAL
EOSINOPHIL # BLD AUTO: 0.7 K/UL (ref 0–0.5)
EOSINOPHIL NFR BLD: 5.8 % (ref 0–8)
ERYTHROCYTE [DISTWIDTH] IN BLOOD BY AUTOMATED COUNT: 25.8 % (ref 11.5–14.5)
EST. GFR  (NO RACE VARIABLE): >60 ML/MIN/1.73 M^2
GLUCOSE SERPL-MCNC: 275 MG/DL (ref 70–110)
HCT VFR BLD AUTO: 32.6 % (ref 37–48.5)
HGB BLD-MCNC: 9.7 G/DL (ref 12–16)
IMM GRANULOCYTES # BLD AUTO: 0.3 K/UL (ref 0–0.04)
IMM GRANULOCYTES NFR BLD AUTO: 2.7 % (ref 0–0.5)
LYMPHOCYTES # BLD AUTO: 4.5 K/UL (ref 1–4.8)
LYMPHOCYTES NFR BLD: 39.8 % (ref 18–48)
MAGNESIUM SERPL-MCNC: 1.7 MG/DL (ref 1.6–2.6)
MCH RBC QN AUTO: 22.1 PG (ref 27–31)
MCHC RBC AUTO-ENTMCNC: 29.8 G/DL (ref 32–36)
MCV RBC AUTO: 74 FL (ref 82–98)
MONOCYTES # BLD AUTO: 1.4 K/UL (ref 0.3–1)
MONOCYTES NFR BLD: 12.4 % (ref 4–15)
NEUTROPHILS # BLD AUTO: 4.3 K/UL (ref 1.8–7.7)
NEUTROPHILS NFR BLD: 38.1 % (ref 38–73)
NRBC BLD-RTO: 0 /100 WBC
PHOSPHATE SERPL-MCNC: 5.4 MG/DL (ref 2.7–4.5)
PLATELET # BLD AUTO: 494 K/UL (ref 150–450)
PLATELET BLD QL SMEAR: ABNORMAL
PMV BLD AUTO: 10.1 FL (ref 9.2–12.9)
POCT GLUCOSE: 292 MG/DL (ref 70–110)
POCT GLUCOSE: 306 MG/DL (ref 70–110)
POTASSIUM SERPL-SCNC: 4.7 MMOL/L (ref 3.5–5.1)
PROT SERPL-MCNC: 5.8 G/DL (ref 6–8.4)
RBC # BLD AUTO: 4.38 M/UL (ref 4–5.4)
SODIUM SERPL-SCNC: 137 MMOL/L (ref 136–145)
WBC # BLD AUTO: 11.25 K/UL (ref 3.9–12.7)

## 2023-10-11 PROCEDURE — 25000003 PHARM REV CODE 250: Performed by: HOSPITALIST

## 2023-10-11 PROCEDURE — 80053 COMPREHEN METABOLIC PANEL: CPT | Performed by: STUDENT IN AN ORGANIZED HEALTH CARE EDUCATION/TRAINING PROGRAM

## 2023-10-11 PROCEDURE — 90471 IMMUNIZATION ADMIN: CPT | Performed by: STUDENT IN AN ORGANIZED HEALTH CARE EDUCATION/TRAINING PROGRAM

## 2023-10-11 PROCEDURE — 84100 ASSAY OF PHOSPHORUS: CPT | Performed by: STUDENT IN AN ORGANIZED HEALTH CARE EDUCATION/TRAINING PROGRAM

## 2023-10-11 PROCEDURE — 25000242 PHARM REV CODE 250 ALT 637 W/ HCPCS: Performed by: STUDENT IN AN ORGANIZED HEALTH CARE EDUCATION/TRAINING PROGRAM

## 2023-10-11 PROCEDURE — 36415 COLL VENOUS BLD VENIPUNCTURE: CPT | Performed by: STUDENT IN AN ORGANIZED HEALTH CARE EDUCATION/TRAINING PROGRAM

## 2023-10-11 PROCEDURE — 94761 N-INVAS EAR/PLS OXIMETRY MLT: CPT

## 2023-10-11 PROCEDURE — 25000003 PHARM REV CODE 250: Performed by: NURSE PRACTITIONER

## 2023-10-11 PROCEDURE — 83735 ASSAY OF MAGNESIUM: CPT | Performed by: STUDENT IN AN ORGANIZED HEALTH CARE EDUCATION/TRAINING PROGRAM

## 2023-10-11 PROCEDURE — 25000003 PHARM REV CODE 250: Performed by: STUDENT IN AN ORGANIZED HEALTH CARE EDUCATION/TRAINING PROGRAM

## 2023-10-11 PROCEDURE — 63600175 PHARM REV CODE 636 W HCPCS: Performed by: STUDENT IN AN ORGANIZED HEALTH CARE EDUCATION/TRAINING PROGRAM

## 2023-10-11 PROCEDURE — 90686 IIV4 VACC NO PRSV 0.5 ML IM: CPT | Performed by: STUDENT IN AN ORGANIZED HEALTH CARE EDUCATION/TRAINING PROGRAM

## 2023-10-11 PROCEDURE — 85025 COMPLETE CBC W/AUTO DIFF WBC: CPT | Performed by: STUDENT IN AN ORGANIZED HEALTH CARE EDUCATION/TRAINING PROGRAM

## 2023-10-11 PROCEDURE — 94640 AIRWAY INHALATION TREATMENT: CPT

## 2023-10-11 RX ORDER — OXYCODONE AND ACETAMINOPHEN 5; 325 MG/1; MG/1
1 TABLET ORAL EVERY 6 HOURS PRN
Qty: 15 TABLET | Refills: 0 | Status: SHIPPED | OUTPATIENT
Start: 2023-10-11 | End: 2023-10-17 | Stop reason: SDUPTHER

## 2023-10-11 RX ORDER — INSULIN ASPART 100 [IU]/ML
5 INJECTION, SOLUTION INTRAVENOUS; SUBCUTANEOUS 3 TIMES DAILY
Qty: 15 ML | Refills: 0 | Status: SHIPPED | OUTPATIENT
Start: 2023-10-11 | End: 2023-10-19 | Stop reason: SDUPTHER

## 2023-10-11 RX ORDER — DOXYCYCLINE 100 MG/1
100 CAPSULE ORAL 2 TIMES DAILY
Qty: 20 CAPSULE | Refills: 0 | Status: SHIPPED | OUTPATIENT
Start: 2023-10-11 | End: 2023-10-21

## 2023-10-11 RX ORDER — DIVALPROEX SODIUM 500 MG/1
500 TABLET, DELAYED RELEASE ORAL NIGHTLY
Qty: 30 TABLET | Refills: 11 | Status: SHIPPED | OUTPATIENT
Start: 2023-10-11 | End: 2024-10-10

## 2023-10-11 RX ADMIN — GABAPENTIN 600 MG: 300 CAPSULE ORAL at 02:10

## 2023-10-11 RX ADMIN — OXYCODONE HYDROCHLORIDE AND ACETAMINOPHEN 1 TABLET: 5; 325 TABLET ORAL at 05:10

## 2023-10-11 RX ADMIN — METHOCARBAMOL 500 MG: 500 TABLET ORAL at 02:10

## 2023-10-11 RX ADMIN — ARFORMOTEROL TARTRATE 15 MCG: 15 SOLUTION RESPIRATORY (INHALATION) at 09:10

## 2023-10-11 RX ADMIN — MICONAZOLE NITRATE: 20 POWDER TOPICAL at 10:10

## 2023-10-11 RX ADMIN — ASPIRIN 81 MG CHEWABLE TABLET 81 MG: 81 TABLET CHEWABLE at 09:10

## 2023-10-11 RX ADMIN — APIXABAN 5 MG: 5 TABLET, FILM COATED ORAL at 09:10

## 2023-10-11 RX ADMIN — INSULIN ASPART 6 UNITS: 100 INJECTION, SOLUTION INTRAVENOUS; SUBCUTANEOUS at 11:10

## 2023-10-11 RX ADMIN — BUMETANIDE 1 MG: 1 TABLET ORAL at 09:10

## 2023-10-11 RX ADMIN — INSULIN ASPART 8 UNITS: 100 INJECTION, SOLUTION INTRAVENOUS; SUBCUTANEOUS at 09:10

## 2023-10-11 RX ADMIN — BUDESONIDE 0.5 MG: 0.5 INHALANT RESPIRATORY (INHALATION) at 09:10

## 2023-10-11 RX ADMIN — GABAPENTIN 600 MG: 300 CAPSULE ORAL at 09:10

## 2023-10-11 RX ADMIN — CEFTRIAXONE 2 G: 2 INJECTION, POWDER, FOR SOLUTION INTRAMUSCULAR; INTRAVENOUS at 04:10

## 2023-10-11 RX ADMIN — INFLUENZA VIRUS VACCINE 0.5 ML: 15; 15; 15; 15 SUSPENSION INTRAMUSCULAR at 04:10

## 2023-10-11 RX ADMIN — ONDANSETRON 4 MG: 2 INJECTION INTRAMUSCULAR; INTRAVENOUS at 10:10

## 2023-10-11 RX ADMIN — ACETAMINOPHEN 1000 MG: 500 TABLET, FILM COATED ORAL at 02:10

## 2023-10-11 RX ADMIN — VANCOMYCIN HYDROCHLORIDE 1500 MG: 1.5 INJECTION, POWDER, LYOPHILIZED, FOR SOLUTION INTRAVENOUS at 10:10

## 2023-10-11 RX ADMIN — RISPERIDONE 3 MG: 1 TABLET ORAL at 09:10

## 2023-10-11 RX ADMIN — INSULIN ASPART 15 UNITS: 100 INJECTION, SOLUTION INTRAVENOUS; SUBCUTANEOUS at 09:10

## 2023-10-11 RX ADMIN — Medication 1000 UNITS: at 09:10

## 2023-10-11 RX ADMIN — HYDROXYZINE HYDROCHLORIDE 25 MG: 25 TABLET ORAL at 09:10

## 2023-10-11 RX ADMIN — METHOCARBAMOL 500 MG: 500 TABLET ORAL at 09:10

## 2023-10-11 RX ADMIN — MUPIROCIN: 20 OINTMENT TOPICAL at 09:10

## 2023-10-11 RX ADMIN — PANTOPRAZOLE SODIUM 40 MG: 40 TABLET, DELAYED RELEASE ORAL at 09:10

## 2023-10-11 RX ADMIN — FERROUS SULFATE TAB 325 MG (65 MG ELEMENTAL FE) 1 EACH: 325 (65 FE) TAB at 09:10

## 2023-10-11 RX ADMIN — OXYCODONE HYDROCHLORIDE AND ACETAMINOPHEN 1 TABLET: 5; 325 TABLET ORAL at 09:10

## 2023-10-11 RX ADMIN — INSULIN ASPART 15 UNITS: 100 INJECTION, SOLUTION INTRAVENOUS; SUBCUTANEOUS at 11:10

## 2023-10-11 NOTE — TELEPHONE ENCOUNTER
----- Message from Grace Solorzano RN sent at 10/11/2023  3:37 PM CDT -----  Regarding: Hospital follow up  Please contact patient to schedule follow up appointment. Thank you

## 2023-10-11 NOTE — PLAN OF CARE
Problem: Skin or Soft Tissue Infection  Goal: Absence of Infection Signs and Symptoms  Outcome: Ongoing, Progressing     Problem: Skin Injury Risk Increased  Goal: Skin Health and Integrity  Outcome: Ongoing, Progressing     Problem: Fall Injury Risk  Goal: Absence of Fall and Fall-Related Injury  Outcome: Ongoing, Progressing     Problem: Diabetes Comorbidity  Goal: Blood Glucose Level Within Targeted Range  Outcome: Ongoing, Progressing

## 2023-10-11 NOTE — PLAN OF CARE
[facility] Surg  Discharge Final Note    All CM needs met. Instructed pt to follow up with pcp, appointment scheduled and listed on avs. Per [facility] dme, pt does not qualify for home oxygen at this time, physician notified and confirmed pt discharge without oxygen. In basket message sent for gen surgery clinic to contact patient for follow up. Pt to resume out patient PT/OT. Pt stated she will drive herself home.     Primary Care Provider: Donaldo Pena MD    Expected Discharge Date: 10/11/2023    Final Discharge Note (most recent)       Final Note - 10/11/23 1321          Final Note    Assessment Type Final Discharge Note (P)      Anticipated Discharge Disposition Home or Self Care (P)      Hospital Resources/Appts/Education Provided Provided patient/caregiver with written discharge plan information;Appointments scheduled and added to AVS (P)         Post-Acute Status    Home Health Status Discharge Plan Changed (P)                      Important Message from Medicare

## 2023-10-11 NOTE — NURSING
[facility]  Nurses Note -- 4 Eyes      10/10/2023       Skin assessed on: Q Shift      [x] No Pressure Injuries Present    []Prevention Measures Documented    [] Yes LDA  for Pressure Injury Previously documented     [] Yes New Pressure Injury Discovered   [] LDA for New Pressure Injury Added      Attending RN:  Jacoby Kahn, RN     Second RN:  Anna Moeller RN

## 2023-10-11 NOTE — PLAN OF CARE
Problem: Adult Inpatient Plan of Care  Goal: Plan of Care Review  Outcome: Adequate for Care Transition  Goal: Patient-Specific Goal (Individualized)  Outcome: Adequate for Care Transition  Goal: Absence of Hospital-Acquired Illness or Injury  Outcome: Adequate for Care Transition  Goal: Optimal Comfort and Wellbeing  Outcome: Adequate for Care Transition  Goal: Readiness for Transition of Care  Outcome: Adequate for Care Transition     Problem: Diabetes Comorbidity  Goal: Blood Glucose Level Within Targeted Range  Outcome: Adequate for Care Transition     Problem: Bariatric Environmental Safety  Goal: Safety Maintained with Care  Outcome: Adequate for Care Transition     Problem: Fall Injury Risk  Goal: Absence of Fall and Fall-Related Injury  Outcome: Adequate for Care Transition     Problem: Skin or Soft Tissue Infection  Goal: Absence of Infection Signs and Symptoms  Outcome: Adequate for Care Transition     Problem: Skin Injury Risk Increased  Goal: Skin Health and Integrity  Outcome: Adequate for Care Transition

## 2023-10-11 NOTE — PLAN OF CARE
Chart reviewed.  Remained afebrile overnight.   Scalp abscess culture grew MSSA.   On day 6 vanc/ ceftriaxone.  Recommend completing a 10 day course doxy + cefadroxil for preseptal cellulitis and scalp abscess s/p I+D. F/u with gen sx.     Nida Salazar MD  Infectious Disease

## 2023-10-11 NOTE — DISCHARGE SUMMARY
[facility] Medicine  Discharge Summary      Patient Name: Audrey Natarajan  MRN: [id number]  [facility]: [id number]  Patient Class: IP- Inpatient  Admission Date: 10/6/2023  Hospital Length of Stay: 5 days  Discharge Date and Time:  10/11/2023 11:27 AM  Attending Physician: Abbey Germain, *   Discharging Provider: Abbey Germain MD  Primary Care Provider: Donaldo Pena MD    Primary Care Team: Networked reference to record PCT     HPI:   This is a 53-year-old female with a past medical history of COPD, type 2 diabetes, hypertension, hyperlipidemia, PAD, bipolar disorder, VTE (on Eliquis), tobacco use, s/p left BKA who presents with eye swelling.      Patient presents with left eye swelling that was noted a day prior to presentation.  She initially had an eczematous rash on her scalp that started 4 days prior.  It was pruritic and painful like someone tugging on her hair.  She reports scratching at it and that it was progressively getting worse.  She later developed headache and right-sided glandular swelling.  On the night prior to presentation, she noted developing left eye swelling with some mild pressure.  She denied having limitation of eye movements, pain with eye movements, decreased vision or double vision. She recently finished a steroid course on 10/3.    In the ED, the patient was hemodynamically stable.  Per ED provider, she had normal intra-ocular pressures.  Labs remarkable for leukocytosis (15.7), elevated lactic acid (6.5 > 4.5), elevated anion gap (17), hyperglycemia (370).  CT head/orbit showed left periorbital soft tissue swelling and edema (periorbital/preseptal cellulitis in the right clinical setting with no intraorbital extension or other acute orbital abnormalities identified).  She was given 1 L of LR, vancomycin, ceftriaxone, Protonix 40 mg IV, Zofran 4 mg IV, and Toradol 10 mg IV.  Patient was admitted for further management.      * No surgery found *       Hospital Course:   Patient admitted with preseptal cellulitis of the left eye.  Started on vancomycin and ceftriaxone.  Id consult id given proximity to eye, patient has ulcers, suspicious for MRSA per ID.  Continuing vanc plus ceftriaxone for now.  Patient's glucoses have been in the 400s, patient is not on insulin at home.  Unclear if this is a sudden rise in glucose due to infection, or if she has been unknowingly having these high glucoses at home which has resulted in infection.  Will start insulin.  A1c came back at 11.6 and high insulin requirement so far, will need to go home with new scripts for insulin and dm supplies.  WBC count normalized , and face looking much better.  Up to 60 units long-acting insulin and 45 units short-acting +SSI, and glucose still elevated.  Patient did finish a steroid pack a few days before admission and is likely interfered with her A1c, however patient showing no signs of not needing insulin at this point.  Culture after I&D on scalp abscess by surgery show MSSA,.already  on IV Abx.  Preseptal cellulitis and scalp abscess much improved,patient was discharged home with PO Avx and follow  up with PCP as out patient.       Goals of Care Treatment Preferences:  Code Status: Full Code    Living Will: Yes              Consults:   Consults (From admission, onward)        Status Ordering Provider     Inpatient consult to General Surgery  Once        Provider:  Aura Vargas MD    Completed ANNE-MARIE WEINSTEIN A     Inpatient consult to Infectious Diseases  Once        Provider:  Terry Pereira MD    Completed ANNE-MARIE WEINSTEIN A     Pharmacy to dose Vancomycin consult  Once        Provider:  (Not yet assigned)   See [facility] for full Linked Orders Report.    Acknowledged IAR COVARRUBIAS          No new Assessment & Plan notes have been filed under this hospital service since the last note was generated.  Service: Hospital Medicine    Final Active Diagnoses:    Diagnosis Date Noted POA     "PRINCIPAL PROBLEM:  Preseptal cellulitis [L03.213] 10/06/2023 Yes    Hyperlipidemia [E78.5] 02/13/2014 Yes     Chronic    Essential hypertension [I10] 06/06/2013 Yes     Chronic    Abscess of scalp [L02.811] 10/09/2023 Yes    Elevated lactic acid level [R79.89] 10/06/2023 Yes    PAD (peripheral artery disease) [I73.9] 05/06/2022 Yes    Chronic deep vein thrombosis (DVT) of both lower extremities [I82.503] 04/13/2022 Yes    SHAWN (obstructive sleep apnea) [G47.33] 02/09/2022 Yes    History of pulmonary embolism [Z86.711] 09/25/2019 Yes    Type 2 diabetes mellitus [E11.9] 09/26/2016 Yes     Chronic    Class 2 severe obesity with serious comorbidity and body mass index (BMI) of 37.0 to 37.9 in adult [E66.01, Z68.37] 08/10/2016 Not Applicable    Bipolar 1 disorder [F31.9] 04/13/2015 Yes     Chronic    History of DVT (deep vein thrombosis) [Z86.718] 04/13/2015 Not Applicable     Chronic    Tobacco abuse [Z72.0] 03/06/2014 Yes     Chronic    COPD (chronic obstructive pulmonary disease) [J44.9] 10/11/2013 Yes     Chronic      Problems Resolved During this Admission:       Discharged Condition: stable    Disposition: Home or Self Care    Follow Up:    Patient Instructions:      OXYGEN FOR HOME USE     Order Specific Question Answer Comments   Liter Flow 2    Duration With activity    Qualifying Test Performed at: Activity    Oxygen saturation at rest 88    Oxygen saturation with activity 88    Oxygen saturation with activity on oxygen 92    Portable mode: continuous    Route nasal cannula    Device: home concentrator with portable tanks    Length of need (in months): 3 mos    Patient condition with qualifying saturation COPD    Height: 5' 6" (1.676 m)    Weight: 115.6 kg (254 lb 13.6 oz)    Alternative treatment measures have been tried or considered and deemed clinically ineffective. Yes      Ambulatory referral/consult to Physical/Occupational Therapy   Standing Status: Future   Referral Priority: Routine " Referral Type: Physical Medicine   Referral Reason: Specialty Services Required   Number of Visits Requested: 1     Ambulatory referral/consult to Smoking Cessation Program   Standing Status: Future   Referral Priority: Routine Referral Type: Consultation   Referral Reason: Specialty Services Required   Requested Specialty: CTTS   Number of Visits Requested: 1     Reason for not Prescribing Nicotine Replacement     Order Specific Question Answer Comments   Reason for not Prescribing: Patient refused      Activity as tolerated       Significant Diagnostic Studies: Microbiology:   Blood Culture   Lab Results   Component Value Date    LABBLOO No Growth after 4 days. 10/06/2023    and Wound Culture: positive  Radiology: X-Ray: CXR: X-Ray Chest 1 View (CXR): No results found for this visit on 10/06/23. and X-Ray Chest PA and Lateral (CXR): No results found for this visit on 10/06/23.    Pending Diagnostic Studies:     None         Medications:  Reconciled Home Medications:      Medication List      START taking these medications    insulin aspart U-100 100 unit/mL (3 mL) Inpn pen  Commonly known as: NovoLOG  Inject 5 Units into the skin 3 (three) times daily.     insulin detemir U-100 (Levemir) 100 unit/mL (3 mL) Inpn pen  Inject 15 Units into the skin 2 (two) times daily.     oxyCODONE-acetaminophen 5-325 mg per tablet  Commonly known as: PERCOCET  Take 1 tablet by mouth every 6 (six) hours as needed.        CONTINUE taking these medications    albuterol 90 mcg/actuation inhaler  Commonly known as: PROVENTIL/VENTOLIN HFA  INHALE 2 PUFFS INTO THE LUNGS EVERY 6 HOURS AS NEEDED FOR WHEEZING. RESCUE     albuterol-ipratropium 2.5 mg-0.5 mg/3 mL nebulizer solution  Commonly known as: DUO-NEB  Take 3 mLs by nebulization every 6 (six) hours as needed for Wheezing or Shortness of Breath. Rescue     ammonium lactate 12 % lotion  Commonly known as: LAC-HYDRIN  APPL Y ONCE TOPICALLY TWICE DAILY FOR 30 DAYS     aspirin 81 MG  Chew  Take 1 tablet (81 mg total) by mouth once daily.     benzonatate 100 MG capsule  Commonly known as: TESSALON  Take 1 capsule (100 mg total) by mouth 3 (three) times daily as needed for Cough.     BIOFREEZE (MENTHOL) TOP  Apply topically.     bumetanide 1 MG tablet  Commonly known as: BUMEX  Take 1 tablet (1 mg total) by mouth once daily.     cyanocobalamin 1000 MCG tablet  Commonly known as: VITAMIN B-12  Take 100 mcg by mouth once daily.     divalproex 500 MG Tbec  Commonly known as: DEPAKOTE  Take 1 tablet (500 mg total) by mouth every evening.     doxycycline 100 MG Cap  Commonly known as: VIBRAMYCIN  Take 1 capsule (100 mg total) by mouth 2 (two) times daily. for 10 days     DUPIXENT  mg/2 mL Pnij  Generic drug: dupilumab  Inject into the skin every 14 (fourteen) days.     ELIQUIS 5 mg Tab  Generic drug: apixaban  Take 1 tablet (5 mg total) by mouth in the morning and 1 tablet (5 mg total) in the evening. Indications: Thromboembolism secondary to Atrial Fibrillation.     ferrous sulfate 325 (65 FE) MG EC tablet  Take 1 tablet (325 mg total) by mouth once daily.     fluconazole 150 MG Tab  Commonly known as: DIFLUCAN  Take 1 tablet (150 mg total) by mouth once daily. May take second tab po two days after first if symptoms persist     fluticasone propionate 50 mcg/actuation nasal spray  Commonly known as: FLONASE  2 sprays (100 mcg total) by Each Nostril route once daily.     fluticasone-salmeterol 250-50 mcg/dose 250-50 mcg/dose diskus inhaler  Commonly known as: ADVAIR  Inhale 1 puff into the lungs 2 (two) times daily. Controller     gabapentin 300 MG capsule  Commonly known as: NEURONTIN  TAKE 2 CAPSULES(600 MG) BY MOUTH THREE TIMES DAILY     hydrOXYzine HCL 25 MG tablet  Commonly known as: ATARAX  Take 1 tablet (25 mg total) by mouth every 6 (six) hours as needed for itching or anxiety.     LIDOcaine 5 %  Commonly known as: LIDODERM  Place 1 patch onto the skin once daily. Remove & Discard patch  "within 12 hours or as directed by MD     lisinopriL 10 MG tablet  Take 1 tablet (10 mg total) by mouth once daily.     loratadine 10 mg tablet  Commonly known as: CLARITIN  TAKE 1 TABLET BY MOUTH DAILY     metFORMIN 1000 MG tablet  Commonly known as: GLUCOPHAGE  TAKE 1 TABLET(1000 MG) BY MOUTH TWICE DAILY WITH MEALS     methocarbamoL 500 MG Tab  Commonly known as: ROBAXIN  Take 1 tablet (500 mg total) by mouth 3 (three) times daily. Frequency could not be confirmed.     metoprolol tartrate 25 MG tablet  Commonly known as: LOPRESSOR  Take 1 tablet (25 mg total) by mouth 2 (two) times daily.     multivitamin Tab  Take 1 tablet by mouth once daily.     nystatin powder  Commonly known as: NYSTOP  APPLY TO ABDOMINAL AND BREAST SKIN FOLD TWICE DAILY.     OZEMPIC 1 mg/dose (2 mg/1.5 mL) Pnij  Generic drug: semaglutide  Inject 1 mg into the skin every 7 days.     pantoprazole 40 MG tablet  Commonly known as: PROTONIX  Take 1 tablet (40 mg total) by mouth once daily.     pravastatin 40 MG tablet  Commonly known as: PRAVACHOL  Take 1 tablet (40 mg total) by mouth every evening.     risperiDONE 3 MG Tab  Commonly known as: RISPERDAL  Take 1 tablet (3 mg total) by mouth in the morning and 1 tablet (3 mg total) in the evening. Indications: Mood.     traMADoL 50 mg tablet  Commonly known as: ULTRAM  Take 1 tablet (50 mg total) by mouth every 8 (eight) hours as needed for Pain (foot pain).     vitamin E 1000 UNIT capsule  Take 1,000 Units by mouth once daily.     VYVANSE 40 MG Cap  Generic drug: lisdexamfetamine  Take 40 mg by mouth once daily.        STOP taking these medications    promethazine-dextromethorphan 6.25-15 mg/5 mL Syrp  Commonly known as: PROMETHAZINE-DM        ASK your doctor about these medications    pen needle, diabetic 32 gauge x 5/32" Ndle  USE TO INJECT INSULIN FOUR TIMES DAILY AS DIRECTED  Ask about: Which instructions should I use?            Indwelling Lines/Drains at time of discharge: "   Lines/Drains/Airways     Drain  Duration           Female External Urinary Catheter 10/07/23 0130 4 days                Time spent on the discharge of patient: over 30  minutes         Abbey Germain MD  Department of Hospital Medicine  [facility]

## 2023-10-11 NOTE — PLAN OF CARE
Problem: Adult Inpatient Plan of Care  Goal: Patient-Specific Goal (Individualized)  Outcome: Ongoing, Progressing  Goal: Absence of Hospital-Acquired Illness or Injury  Outcome: Ongoing, Progressing     Problem: Bariatric Environmental Safety  Goal: Safety Maintained with Care  Outcome: Ongoing, Progressing     Problem: Fall Injury Risk  Goal: Absence of Fall and Fall-Related Injury  Outcome: Ongoing, Progressing     Problem: Skin or Soft Tissue Infection  Goal: Absence of Infection Signs and Symptoms  Outcome: Ongoing, Progressing

## 2023-10-11 NOTE — CONSULTS
[facility]  Wound Care    Patient Name:  Audrey Natarajan   MRN:  [id number]  Date: 10/11/2023  Diagnosis: Preseptal cellulitis    History:     Past Medical History:   Diagnosis Date    ADHD (attention deficit hyperactivity disorder)     Arthritis     Asthma     Bipolar 1 disorder     Cataract     Cigarette smoker     COPD (chronic obstructive pulmonary disease)     Coronary artery disease     A fib    Depression     bipolar manic depresson    Diabetes mellitus     Diabetic foot ulcers     Diabetic neuropathy     DVT of lower extremity, bilateral 2013    bilateral LE DVT. [place] filter placed.     Encounter for blood transfusion     History of blood clots 1. Left Leg=; 2.Bilateral Groin=Blood Clots=  or 2013 & 2013; 3. LLL of Lung=2013;  4. Lt. Lower Leg=2013.     Pt. had 1st Blood Clot after Vqwxearnnvdo=[id number], & Last=. [place] Filter= Rt.Lateral Neck.    HTN (hypertension) 2013    Pt states that she does not have hypertension    Hypercholesteremia     Irregular heartbeat     Neuromuscular disorder     neuropathy feet    Obese     PE (pulmonary embolism) 2013    bilat LE DVT.     Restless leg syndrome        Social History     Socioeconomic History    Marital status: Significant Other   Tobacco Use    Smoking status: Former     Current packs/day: 0.00     Average packs/day: 0.5 packs/day for 37.0 years (18.5 ttl pk-yrs)     Types: Cigarettes     Start date: 1983     Quit date: 2020     Years since quittin.8    Smokeless tobacco: Current    Tobacco comments:     Enrolled in the [employer] on 5/3/14 ([facility] Member ID # [id number]). Ambulatory referral to Smoking Cessation Program   Substance and Sexual Activity    Alcohol use: No     Alcohol/week: 0.0 standard drinks of alcohol    Drug use: No    Sexual activity: Yes     Partners: Male   Social History Narrative     from her  of 20 years    Lives by herself since 2019     Social  Determinants of Health     Financial Resource Strain: Medium Risk (3/6/2023)    Overall Financial Resource Strain (CARDIA)     Difficulty of Paying Living Expenses: Somewhat hard   Food Insecurity: Food Insecurity Present (3/6/2023)    Hunger Vital Sign     Worried About Running Out of Food in the Last Year: Often true     Ran Out of Food in the Last Year: Often true   Transportation Needs: Unmet Transportation Needs (3/6/2023)    PRAPARE - Transportation     Lack of Transportation (Medical): Yes     Lack of Transportation (Non-Medical): Yes   Physical Activity: Inactive (3/6/2023)    Exercise Vital Sign     Days of Exercise per Week: 0 days     Minutes of Exercise per Session: 0 min   Stress: Stress Concern Present (3/6/2023)    [employer] [employer] - Occupational Stress Questionnaire     Feeling of Stress : To some extent   Social Connections: Moderately Isolated (3/6/2023)    Social Connection and Isolation Panel [NHANES]     Frequency of Communication with Friends and Family: More than three times a week     Frequency of Social Gatherings with Friends and Family: More than three times a week     Attends Buddhism Services: 1 to 4 times per year     Active Member of Clubs or Organizations: No     Attends Club or Organization Meetings: Never     Marital Status: Never    Housing Stability: Low Risk  (3/20/2023)    Housing Stability Vital Sign     Unable to Pay for Housing in the Last Year: No     Number of Places Lived in the Last Year: 1     Unstable Housing in the Last Year: No       Precautions:     Allergies as of 10/06/2023 - Reviewed 10/06/2023   Allergen Reaction Noted    Morphine Other (See Comments) 11/27/2012    Penicillins Anaphylaxis 11/27/2012    Januvia [sitagliptin] Hives 09/10/2020    Carbamazepine Other (See Comments) 05/06/2022       [facility] Assessment Details/Treatment   Consulted for patient request for neosporin for face  A 53 year old female admitted 10/6/23 from home  with preseptal cellulitis; PAD; DVT both lower extremities; SHAWN; history PE; DM II; bipolar disorder; tobacco abuse; HLD; COPD; essential hypertension  10/11 WBC 11.25 Hgb 9.7 Hct 32.6 Alb 2.5 Weight 254 lbs  10/7 A1C 11.6   10/9 C&S head- Staph aureus- I&D per surgery; orders to remove packing 10/10 and change dressing as needed  10/10 Surgery- outpatient referral to clinic for scalp cyst removal; change dressing as needed; OK to leave uncovered; signed off- please call with questions  Recommended treatment of wounds on face and scalp-   Cleanse with Vashe. Apply moistened gauze to areas for 5 minutes. Pat dry and apply mupirocin 2% (C&S of scalp is Staph Aureus). Do not recommend use of Neosporin.            10/11/2023

## 2023-10-13 LAB — BACTERIA SPEC ANAEROBE CULT: NORMAL

## 2023-10-16 ENCOUNTER — CLINICAL SUPPORT (OUTPATIENT)
Dept: REHABILITATION | Facility: HOSPITAL | Age: 53
End: 2023-10-16
Payer: MEDICAID

## 2023-10-16 DIAGNOSIS — L97.423 LEFT MIDFOOT ULCER, WITH NECROSIS OF MUSCLE: ICD-10-CM

## 2023-10-16 DIAGNOSIS — Z98.890 STATUS POST FOOT SURGERY: ICD-10-CM

## 2023-10-16 DIAGNOSIS — Z74.09 IMPAIRED FUNCTIONAL MOBILITY, BALANCE, GAIT, AND ENDURANCE: Primary | ICD-10-CM

## 2023-10-16 PROCEDURE — 97110 THERAPEUTIC EXERCISES: CPT | Mod: PN,CQ

## 2023-10-16 NOTE — PROGRESS NOTES
[facility]   Physical Therapy Treatment Note     Name: Audrey Natarajan  Clinic Number: [id number]    Therapy Diagnosis:   Encounter Diagnoses   Name Primary?    Impaired functional mobility, balance, gait, and endurance Yes    Left midfoot ulcer, with necrosis of muscle     Status post foot surgery        Physician: Gabe Munoz MD    Visit Date: 10/16/2023    Physician Orders: PT Eval and Treat, Post surgical   Medical Diagnosis from Referral: L97.423 (ICD-10-CM) - Left midfoot ulcer, with necrosis of muscle  Evaluation Date: 7/6/2023  Authorization Period Expiration: 12/31/23  Plan of Care Expiration: 10/27/23  Visit # / Visits authorized: 16/20    Precautions: Standard, Diabetes, and Fall    Time In: 3:00pm  Time Out: 4:50pm  Total Billable Time: 50 minutes    SUBJECTIVE     Audrey reports: she was in the hospital for a week because she had a cyst on top of head. This is the forth time she had it. Patient showed therapist a clearance paperwork from her doctor that she can return to physical therapy to continue her treatment. Other than that, she has been practicing walking more at home with the cane. She still mild pain at the lateral end of Left leg while in the prosthesis, but she said they have shaved some part of the prosthesis and added extra cushion to prevent pressure sores to the area.     She was compliant with home exercise program.  Response to previous treatment: No change   Functional change: None    Pain: 3/10  Location: L lateral shin    OBJECTIVE     None taken today.     TREATMENT     Audrey received the treatments listed below:       received therapeutic exercises to develop strength and ROM for 50 minutes including:     Testing listed above.    Assessing integrity of skin at anterior L shin pre and post session.      In parallel bars:      Stepping forward and back over pool noodle 2x10   -leading with R LE   -one UE support     Mini squats in // bars with  no UE support 2x10    Gait with SPC, L prosthesis and S from PT (SBA today, ambulate 70ft x 2)   Gait with no AD, L prosthesis, and CGA to SBA from PT x 40ft, x 40ft - defer today, but resume next visit if able     Stairs, with 4-inch steps:     -up/down steps with one handrails x 4 rounds  -step to gait (close SBA) (Use both handrails today and cues to maintain Left foot in neutral position to step down rather than going laterally while descending down on steps).         -step-to pattern throughout     *frequent rest breaks throughout     PATIENT EDUCATION AND HOME EXERCISES     Home Exercises Provided and Patient Education Provided     Education provided:   - standing HEP  - continuing to wear prosthesis for 1 hour at a time, taking off for one hour    Written Home Exercises Provided: Patient instructed to cont prior HEP. Exercises were reviewed and Audrey was able to demonstrate them prior to the end of the session.  Audrey demonstrated good  understanding of the education provided. See EMR under Patient Instructions for exercises provided during therapy sessions    ASSESSMENT     Patient was last re-assess by physical therapist on 9/27/2023. It appears patient has showed improvement in balance activities as well as walking with A.D. Therapist review with patient on daily checking for pressure sores to Left leg and proper wear for prosthesis. Patient demonstrates improved gait pattern with even step length using a cane, SBA today. At this time, patient is making steadily progress towards her treatment goals. Will continue to work on dynamic balance, endurance, and gait.     Audrey Is progressing well towards her goals.   Pt prognosis is Good.     Pt will continue to benefit from skilled outpatient physical therapy to address the deficits listed in the problem list box on initial evaluation, provide pt/family education and to maximize pt's level of independence in the home and community environment.     Pt's  spiritual, cultural and educational needs considered and pt agreeable to plan of care and goals.     Anticipated barriers to physical therapy: none    Goals: Short Term Goals- 4 Weeks  Pt to demonstrate independence in performance of HEP in order to improve daily level of physical activity and improve carry over session to session. Ongoing  Pt to improve B LE strength by > / = 1/2 MMT score in order to improve strength for daily activities. Ongoing  Pt to score < / = 22 seconds pm TUG in order to decrease fall risk and maximize functional strength for daily activities.Ongoing  Pt to score > / = 19 on Tinetti in order to improve safety and balance during gait.Ongoing  Pt to demo gait for > 200 ft with SPC in order to promote return to independence in home and community. Ongoing       Long Term Goals - 12 Weeks  Pt to demonstrate compliance with HEP > / = 3x per week in order to improve daily level of physical activity and improve carry over session to session.Ongoing  Pt to improve B LE strength by > / = 1 MMT score in order to improve strength for daily activities.Ongoing  Pt to demonstrate TUG score of < / = 18 seconds in order to decrease fall risk and maximize functional strength for daily activities.Ongoing  Pt to score > / = 24 on Tinetti in order to improve safety and balance during gait.Ongoing  Pt to demo gait for > 200 ft without AD in order to promote return to independence in home and community. Ongoing  Pt to demonstrate ability to balance for > / = 30 seconds on all MCTSIB conditions with no sway in order to improve vestibular response and decrease fall risk.  Ongoing    PLAN     Continue PT 1-2x per week for 12 weeks.     Continue gait training and dynamic balance.     Add if able:  Standing marches in //bars  Alternate step taps on 4 inch step    Yessenia Villela, PTA   10/16/2023

## 2023-10-17 ENCOUNTER — OFFICE VISIT (OUTPATIENT)
Dept: SURGERY | Facility: CLINIC | Age: 53
End: 2023-10-17
Payer: MEDICAID

## 2023-10-17 VITALS
BODY MASS INDEX: 41.13 KG/M2 | HEIGHT: 66 IN | SYSTOLIC BLOOD PRESSURE: 148 MMHG | OXYGEN SATURATION: 95 % | DIASTOLIC BLOOD PRESSURE: 75 MMHG | HEART RATE: 84 BPM

## 2023-10-17 DIAGNOSIS — L72.0 EPIDERMAL CYST: Primary | ICD-10-CM

## 2023-10-17 PROCEDURE — 3046F PR MOST RECENT HEMOGLOBIN A1C LEVEL > 9.0%: ICD-10-PCS | Mod: CPTII,,, | Performed by: SURGERY

## 2023-10-17 PROCEDURE — 1159F PR MEDICATION LIST DOCUMENTED IN MEDICAL RECORD: ICD-10-PCS | Mod: CPTII,,, | Performed by: SURGERY

## 2023-10-17 PROCEDURE — 1111F DSCHRG MED/CURRENT MED MERGE: CPT | Mod: CPTII,,, | Performed by: SURGERY

## 2023-10-17 PROCEDURE — 99213 PR OFFICE/OUTPT VISIT, EST, LEVL III, 20-29 MIN: ICD-10-PCS | Mod: S$PBB,,, | Performed by: SURGERY

## 2023-10-17 PROCEDURE — 3078F PR MOST RECENT DIASTOLIC BLOOD PRESSURE < 80 MM HG: ICD-10-PCS | Mod: CPTII,,, | Performed by: SURGERY

## 2023-10-17 PROCEDURE — 99999 PR PBB SHADOW E&M-EST. PATIENT-LVL IV: CPT | Mod: PBBFAC,,, | Performed by: SURGERY

## 2023-10-17 PROCEDURE — 3077F SYST BP >= 140 MM HG: CPT | Mod: CPTII,,, | Performed by: SURGERY

## 2023-10-17 PROCEDURE — 99999 PR PBB SHADOW E&M-EST. PATIENT-LVL IV: ICD-10-PCS | Mod: PBBFAC,,, | Performed by: SURGERY

## 2023-10-17 PROCEDURE — 3008F BODY MASS INDEX DOCD: CPT | Mod: CPTII,,, | Performed by: SURGERY

## 2023-10-17 PROCEDURE — 3008F PR BODY MASS INDEX (BMI) DOCUMENTED: ICD-10-PCS | Mod: CPTII,,, | Performed by: SURGERY

## 2023-10-17 PROCEDURE — 1159F MED LIST DOCD IN RCRD: CPT | Mod: CPTII,,, | Performed by: SURGERY

## 2023-10-17 PROCEDURE — 1111F PR DISCHARGE MEDS RECONCILED W/ CURRENT OUTPATIENT MED LIST: ICD-10-PCS | Mod: CPTII,,, | Performed by: SURGERY

## 2023-10-17 PROCEDURE — 4010F ACE/ARB THERAPY RXD/TAKEN: CPT | Mod: CPTII,,, | Performed by: SURGERY

## 2023-10-17 PROCEDURE — 3078F DIAST BP <80 MM HG: CPT | Mod: CPTII,,, | Performed by: SURGERY

## 2023-10-17 PROCEDURE — 3046F HEMOGLOBIN A1C LEVEL >9.0%: CPT | Mod: CPTII,,, | Performed by: SURGERY

## 2023-10-17 PROCEDURE — 99214 OFFICE O/P EST MOD 30 MIN: CPT | Mod: PBBFAC | Performed by: SURGERY

## 2023-10-17 PROCEDURE — 4010F PR ACE/ARB THEARPY RXD/TAKEN: ICD-10-PCS | Mod: CPTII,,, | Performed by: SURGERY

## 2023-10-17 PROCEDURE — 3077F PR MOST RECENT SYSTOLIC BLOOD PRESSURE >= 140 MM HG: ICD-10-PCS | Mod: CPTII,,, | Performed by: SURGERY

## 2023-10-17 PROCEDURE — 99213 OFFICE O/P EST LOW 20 MIN: CPT | Mod: S$PBB,,, | Performed by: SURGERY

## 2023-10-17 RX ORDER — OXYCODONE AND ACETAMINOPHEN 5; 325 MG/1; MG/1
1 TABLET ORAL EVERY 4 HOURS PRN
Qty: 15 TABLET | Refills: 0 | Status: SHIPPED | OUTPATIENT
Start: 2023-10-17 | End: 2023-11-16

## 2023-10-17 NOTE — PROGRESS NOTES
54 y/o woman with history of recent hospital admission for cellulitis, now here for f/u with complaints of scalp cyst/ resolving infection    PE: 3-4 cm erythematous lesion with central ulcer and min drainage.    Impression: infected cyst,     Plan: continue abx, wound check in 2 weeks

## 2023-10-19 ENCOUNTER — OFFICE VISIT (OUTPATIENT)
Dept: FAMILY MEDICINE | Facility: CLINIC | Age: 53
End: 2023-10-19
Payer: MEDICAID

## 2023-10-19 VITALS
RESPIRATION RATE: 18 BRPM | DIASTOLIC BLOOD PRESSURE: 60 MMHG | BODY MASS INDEX: 40.96 KG/M2 | WEIGHT: 254.88 LBS | SYSTOLIC BLOOD PRESSURE: 119 MMHG | HEART RATE: 98 BPM | OXYGEN SATURATION: 89 % | TEMPERATURE: 98 F | HEIGHT: 66 IN

## 2023-10-19 DIAGNOSIS — Z12.31 ENCOUNTER FOR SCREENING MAMMOGRAM FOR MALIGNANT NEOPLASM OF BREAST: ICD-10-CM

## 2023-10-19 DIAGNOSIS — L03.213 PRESEPTAL CELLULITIS: ICD-10-CM

## 2023-10-19 DIAGNOSIS — E66.01 MORBID OBESITY: ICD-10-CM

## 2023-10-19 DIAGNOSIS — G54.6 PHANTOM LIMB PAIN: ICD-10-CM

## 2023-10-19 DIAGNOSIS — Z01.419 WELL WOMAN EXAM: ICD-10-CM

## 2023-10-19 DIAGNOSIS — Z79.4 TYPE 2 DIABETES MELLITUS WITH DIABETIC POLYNEUROPATHY, WITH LONG-TERM CURRENT USE OF INSULIN: Primary | ICD-10-CM

## 2023-10-19 DIAGNOSIS — E11.42 TYPE 2 DIABETES MELLITUS WITH DIABETIC POLYNEUROPATHY, WITH LONG-TERM CURRENT USE OF INSULIN: Primary | ICD-10-CM

## 2023-10-19 DIAGNOSIS — F31.9 BIPOLAR 1 DISORDER: ICD-10-CM

## 2023-10-19 DIAGNOSIS — E11.42 TYPE 2 DIABETES MELLITUS WITH DIABETIC POLYNEUROPATHY, WITHOUT LONG-TERM CURRENT USE OF INSULIN: ICD-10-CM

## 2023-10-19 DIAGNOSIS — I10 ESSENTIAL HYPERTENSION: ICD-10-CM

## 2023-10-19 DIAGNOSIS — N76.0 ACUTE VAGINITIS: ICD-10-CM

## 2023-10-19 PROCEDURE — 4010F PR ACE/ARB THEARPY RXD/TAKEN: ICD-10-PCS | Mod: CPTII,,, | Performed by: INTERNAL MEDICINE

## 2023-10-19 PROCEDURE — 3074F SYST BP LT 130 MM HG: CPT | Mod: CPTII,,, | Performed by: INTERNAL MEDICINE

## 2023-10-19 PROCEDURE — 3078F DIAST BP <80 MM HG: CPT | Mod: CPTII,,, | Performed by: INTERNAL MEDICINE

## 2023-10-19 PROCEDURE — 3078F PR MOST RECENT DIASTOLIC BLOOD PRESSURE < 80 MM HG: ICD-10-PCS | Mod: CPTII,,, | Performed by: INTERNAL MEDICINE

## 2023-10-19 PROCEDURE — 1160F PR REVIEW ALL MEDS BY PRESCRIBER/CLIN PHARMACIST DOCUMENTED: ICD-10-PCS | Mod: CPTII,,, | Performed by: INTERNAL MEDICINE

## 2023-10-19 PROCEDURE — 1111F DSCHRG MED/CURRENT MED MERGE: CPT | Mod: CPTII,,, | Performed by: INTERNAL MEDICINE

## 2023-10-19 PROCEDURE — 99214 OFFICE O/P EST MOD 30 MIN: CPT | Mod: S$PBB,,, | Performed by: INTERNAL MEDICINE

## 2023-10-19 PROCEDURE — 99215 OFFICE O/P EST HI 40 MIN: CPT | Mod: PBBFAC,PN | Performed by: INTERNAL MEDICINE

## 2023-10-19 PROCEDURE — 1111F PR DISCHARGE MEDS RECONCILED W/ CURRENT OUTPATIENT MED LIST: ICD-10-PCS | Mod: CPTII,,, | Performed by: INTERNAL MEDICINE

## 2023-10-19 PROCEDURE — 1160F RVW MEDS BY RX/DR IN RCRD: CPT | Mod: CPTII,,, | Performed by: INTERNAL MEDICINE

## 2023-10-19 PROCEDURE — 3008F PR BODY MASS INDEX (BMI) DOCUMENTED: ICD-10-PCS | Mod: CPTII,,, | Performed by: INTERNAL MEDICINE

## 2023-10-19 PROCEDURE — 1159F MED LIST DOCD IN RCRD: CPT | Mod: CPTII,,, | Performed by: INTERNAL MEDICINE

## 2023-10-19 PROCEDURE — 1159F PR MEDICATION LIST DOCUMENTED IN MEDICAL RECORD: ICD-10-PCS | Mod: CPTII,,, | Performed by: INTERNAL MEDICINE

## 2023-10-19 PROCEDURE — 4010F ACE/ARB THERAPY RXD/TAKEN: CPT | Mod: CPTII,,, | Performed by: INTERNAL MEDICINE

## 2023-10-19 PROCEDURE — 99214 PR OFFICE/OUTPT VISIT, EST, LEVL IV, 30-39 MIN: ICD-10-PCS | Mod: S$PBB,,, | Performed by: INTERNAL MEDICINE

## 2023-10-19 PROCEDURE — 3046F PR MOST RECENT HEMOGLOBIN A1C LEVEL > 9.0%: ICD-10-PCS | Mod: CPTII,,, | Performed by: INTERNAL MEDICINE

## 2023-10-19 PROCEDURE — 99999 PR PBB SHADOW E&M-EST. PATIENT-LVL V: CPT | Mod: PBBFAC,,, | Performed by: INTERNAL MEDICINE

## 2023-10-19 PROCEDURE — 99999 PR PBB SHADOW E&M-EST. PATIENT-LVL V: ICD-10-PCS | Mod: PBBFAC,,, | Performed by: INTERNAL MEDICINE

## 2023-10-19 PROCEDURE — 3046F HEMOGLOBIN A1C LEVEL >9.0%: CPT | Mod: CPTII,,, | Performed by: INTERNAL MEDICINE

## 2023-10-19 PROCEDURE — 3074F PR MOST RECENT SYSTOLIC BLOOD PRESSURE < 130 MM HG: ICD-10-PCS | Mod: CPTII,,, | Performed by: INTERNAL MEDICINE

## 2023-10-19 PROCEDURE — 3008F BODY MASS INDEX DOCD: CPT | Mod: CPTII,,, | Performed by: INTERNAL MEDICINE

## 2023-10-19 RX ORDER — DICLOFENAC SODIUM 10 MG/G
2 GEL TOPICAL 2 TIMES DAILY
Qty: 100 G | Refills: 3 | Status: SHIPPED | OUTPATIENT
Start: 2023-10-19

## 2023-10-19 RX ORDER — LISINOPRIL 10 MG/1
10 TABLET ORAL DAILY
Qty: 90 TABLET | Refills: 0 | Status: SHIPPED | OUTPATIENT
Start: 2023-10-19

## 2023-10-19 RX ORDER — PRAVASTATIN SODIUM 40 MG/1
40 TABLET ORAL NIGHTLY
Qty: 30 TABLET | Refills: 5 | Status: SHIPPED | OUTPATIENT
Start: 2023-10-19 | End: 2024-01-19 | Stop reason: SDUPTHER

## 2023-10-19 RX ORDER — SEMAGLUTIDE 1.34 MG/ML
1 INJECTION, SOLUTION SUBCUTANEOUS
Qty: 3 ML | Refills: 11 | Status: SHIPPED | OUTPATIENT
Start: 2023-10-19 | End: 2024-10-18

## 2023-10-19 RX ORDER — INSULIN ASPART 100 [IU]/ML
5 INJECTION, SOLUTION INTRAVENOUS; SUBCUTANEOUS 3 TIMES DAILY
Qty: 15 ML | Refills: 0 | Status: SHIPPED | OUTPATIENT
Start: 2023-10-19

## 2023-10-19 RX ORDER — TRAMADOL HYDROCHLORIDE 50 MG/1
50 TABLET ORAL EVERY 8 HOURS PRN
Qty: 30 TABLET | Refills: 0 | Status: SHIPPED | OUTPATIENT
Start: 2023-10-19 | End: 2023-11-20 | Stop reason: SDUPTHER

## 2023-10-19 RX ORDER — INSULIN PUMP SYRINGE, 3 ML
EACH MISCELLANEOUS
Qty: 1 EACH | Refills: 0 | Status: SHIPPED | OUTPATIENT
Start: 2023-10-19 | End: 2023-12-19

## 2023-10-19 RX ORDER — LANCETS
EACH MISCELLANEOUS
Qty: 100 EACH | Refills: 3 | Status: SHIPPED | OUTPATIENT
Start: 2023-10-19 | End: 2023-12-19 | Stop reason: SDUPTHER

## 2023-10-19 RX ORDER — PEN NEEDLE, DIABETIC 30 GX3/16"
NEEDLE, DISPOSABLE MISCELLANEOUS
Qty: 100 EACH | Refills: 1 | Status: SHIPPED | OUTPATIENT
Start: 2023-10-19

## 2023-10-19 RX ORDER — FLUCONAZOLE 150 MG/1
150 TABLET ORAL DAILY
Qty: 2 TABLET | Refills: 0 | Status: SHIPPED | OUTPATIENT
Start: 2023-10-19 | End: 2024-01-19 | Stop reason: SDUPTHER

## 2023-10-19 NOTE — PROGRESS NOTES
"Subjective:       Patient ID: Audrey Natarajan is a 53 y.o. female.    Chief Complaint: Follow-up (3 month f/u) and Arthritis    F/u blood sugars    HPI: 52 y/o IDDM bipolar disorder chronic low back pain presents with friend for scheduled follow up. Admitted to hospital [facility] for facial cellulitis still on doxycyline facial swelling has resolved she did have significantly elevated blood sugars during admission after being on systemic steroids for two weeks prior to admission. Since discharge she has been back on basal and pre meal insulin she was instructed to stop her glp1 agonist she does feel more vaginal itching since being out of hospital consistent with past yeast infections no dysuria no nausea/vomitting. She is havign bilateral hand and left stump pain       Review of Systems   Constitutional:  Negative for activity change, appetite change, fatigue, fever and unexpected weight change.   HENT:  Negative for ear pain, rhinorrhea and sore throat.    Eyes:  Negative for discharge and visual disturbance.   Respiratory:  Negative for chest tightness, shortness of breath and wheezing.    Cardiovascular:  Negative for chest pain, palpitations and leg swelling.   Gastrointestinal:  Negative for abdominal pain, constipation and diarrhea.   Endocrine: Negative for cold intolerance and heat intolerance.   Genitourinary:  Positive for vaginal discharge. Negative for dysuria and hematuria.   Musculoskeletal:  Positive for arthralgias. Negative for joint swelling and neck stiffness.   Skin:  Negative for rash.   Neurological:  Negative for dizziness, syncope, weakness and headaches.   Psychiatric/Behavioral:  Negative for suicidal ideas.        Objective:     Vitals:    10/19/23 1006   BP: 119/60   Pulse: 98   Resp: 18   Temp: 98.1 °F (36.7 °C)   TempSrc: Oral   SpO2: (!) 89%   Weight: 115.6 kg (254 lb 13.6 oz)   Height: 5' 6" (1.676 m)          Physical Exam  Constitutional:       General: She is not in acute " distress.     Appearance: She is well-developed. She is obese.   HENT:      Head: Normocephalic and atraumatic.   Eyes:      General: No scleral icterus.     Conjunctiva/sclera: Conjunctivae normal.   Cardiovascular:      Rate and Rhythm: Normal rate and regular rhythm.      Heart sounds: No murmur heard.     No friction rub. No gallop.   Pulmonary:      Effort: Pulmonary effort is normal.      Breath sounds: Normal breath sounds. No wheezing or rales.   Abdominal:      General: There is no distension.      Palpations: Abdomen is soft.      Tenderness: There is no abdominal tenderness. There is no right CVA tenderness, left CVA tenderness, guarding or rebound.   Musculoskeletal:         General: No tenderness. Normal range of motion.      Cervical back: Normal range of motion.   Skin:     General: Skin is warm and dry.   Neurological:      Mental Status: She is alert and oriented to person, place, and time.      Cranial Nerves: No cranial nerve deficit.         Assessment and Plan   1. Type 2 diabetes mellitus with diabetic polyneuropathy, with long-term current use of insulin  Recently resumed pre meal and basal insulin needs new glucometer to monitor home sugars prior to pre meal insulin (order written today) repeat a1c in three [facility] resume semaglutide weekly  - lisinopriL 10 MG tablet; Take 1 tablet (10 mg total) by mouth once daily.  Dispense: 90 tablet; Refill: 0  - pravastatin (PRAVACHOL) 40 MG tablet; Take 1 tablet (40 mg total) by mouth every evening.  Dispense: 30 tablet; Refill: 5  - semaglutide (OZEMPIC) 1 mg/dose (2 mg/1.5 mL) PnIj; Inject 1 mg into the skin every 7 days.  Dispense: 3 mL; Refill: 11  - insulin aspart U-100 (NOVOLOG) 100 unit/mL (3 mL) InPn pen; Inject 5 Units into the skin 3 (three) times daily.  Dispense: 15 mL; Refill: 0  - insulin detemir U-100, Levemir, 100 unit/mL (3 mL) SubQ InPn pen; Inject 15 Units into the skin 2 (two) times daily.  Dispense: 15 mL; Refill: 0  - pen needle,  "diabetic 32 gauge x 5/32" Ndle; USE TO INJECT INSULIN FOUR TIMES DAILY AS DIRECTED  Dispense: 100 each; Refill: 1  - CBC Auto Differential; Future  - Comprehensive Metabolic Panel; Future  - Hemoglobin A1C; Future  - Lipid Panel; Future  - blood-glucose meter kit; To check BG three times daily, to use with insurance preferred meter  Dispense: 1 each; Refill: 0  - lancets Misc; To check BG three times daily, to use with insurance preferred meter  Dispense: 100 each; Refill: 3  - blood sugar diagnostic Strp; To check BG three times daily, to use with insurance preferred meter  Dispense: 100 each; Refill: 3    2. Essential hypertension  At goal continue acei  - lisinopriL 10 MG tablet; Take 1 tablet (10 mg total) by mouth once daily.  Dispense: 90 tablet; Refill: 0    3. Morbid obesity  The patient is asked to make an attempt to improve diet and exercise patterns to aid in medical management of this problem.      4. Bipolar 1 disorder  Followed by psychiatry continue    5. Encounter for screening mammogram for malignant neoplasm of breast  Mammogram due next month  - Mammo Digital Screening Bilat w/ Phil; Future    6. Type 2 diabetes mellitus with diabetic polyneuropathy, without long-term current use of insulin  As above    7. Preseptal cellulitis  Resolving to complete doxycyline    8. Acute vaginitis  Fluconazole xone should symptosm persist can take second tablet  - fluconazole (DIFLUCAN) 150 MG Tab; Take 1 tablet (150 mg total) by mouth once daily. May take second tab po two days after first if symptoms persist  Dispense: 2 tablet; Refill: 0    9. Phantom limb pain  Continue gabapentin prn tramadol  - diclofenac sodium (VOLTAREN) 1 % Gel; Apply 2 g topically 2 (two) times daily.  Dispense: 100 g; Refill: 3  - traMADoL (ULTRAM) 50 mg tablet; Take 1 tablet (50 mg total) by mouth every 8 (eight) hours as needed for Pain (foot pain).  Dispense: 30 tablet; Refill: 0    10. Well woman exam  Overdue for GYN exam will " angelique   - Ambulatory referral/consult to Obstetrics / Gynecology; Future

## 2023-10-21 DIAGNOSIS — G54.6 PHANTOM LIMB PAIN: ICD-10-CM

## 2023-10-21 RX ORDER — APIXABAN 5 MG/1
5 TABLET, FILM COATED ORAL 2 TIMES DAILY
Qty: 60 TABLET | Refills: 2 | Status: SHIPPED | OUTPATIENT
Start: 2023-10-21 | End: 2024-01-18

## 2023-10-21 NOTE — TELEPHONE ENCOUNTER
Care Due:                  Date            Visit Type   Department     Provider  --------------------------------------------------------------------------------                                [facility]                              PRIMARY      MEDICINE/  Last Visit: 10-      CARE ([facility])   INTERNAL MED   Donaldo Pena                              [facility]                              PRIMARY      MEDICINE/  Next Visit: 01-      CARE ([facility])   INTERNAL MED   Donaldo Pena                                                            Last  Test          Frequency    Reason                     Performed    Due Date  --------------------------------------------------------------------------------    Lipid Panel.  12 months..  pravastatin..............  Not Found    Overdue    [employer] Embedded Care Due Messages. Reference number: [id number].   10/21/2023 10:29:27 AM CDT

## 2023-10-23 RX ORDER — GABAPENTIN 300 MG/1
600 CAPSULE ORAL 3 TIMES DAILY
Qty: 180 CAPSULE | Refills: 2 | Status: SHIPPED | OUTPATIENT
Start: 2023-10-23 | End: 2024-01-18

## 2023-10-28 DIAGNOSIS — K21.00 GASTROESOPHAGEAL REFLUX DISEASE WITH ESOPHAGITIS WITHOUT HEMORRHAGE: ICD-10-CM

## 2023-10-28 RX ORDER — PANTOPRAZOLE SODIUM 40 MG/1
40 TABLET, DELAYED RELEASE ORAL
Qty: 90 TABLET | Refills: 3 | Status: SHIPPED | OUTPATIENT
Start: 2023-10-28 | End: 2023-11-19

## 2023-10-28 NOTE — TELEPHONE ENCOUNTER
No care due was identified.  [facility] Embedded Care Due Messages. Reference number: [id number].   10/28/2023 8:20:46 AM CDT

## 2023-10-29 NOTE — TELEPHONE ENCOUNTER
Refill Decision Note   Audrey Natarajan  is requesting a refill authorization.  Brief Assessment and Rationale for Refill:  Approve     Medication Therapy Plan:         Comments:     Note composed:11:54 PM 10/28/2023

## 2023-11-03 DIAGNOSIS — Z89.512 S/P BKA (BELOW KNEE AMPUTATION) UNILATERAL, LEFT: ICD-10-CM

## 2023-11-03 NOTE — TELEPHONE ENCOUNTER
No care due was identified.  [facility] Embedded Care Due Messages. Reference number: [id number].   11/03/2023 5:23:59 PM CDT

## 2023-11-06 ENCOUNTER — PATIENT MESSAGE (OUTPATIENT)
Dept: ADMINISTRATIVE | Facility: HOSPITAL | Age: 53
End: 2023-11-06
Payer: MEDICAID

## 2023-11-06 RX ORDER — METHOCARBAMOL 500 MG/1
TABLET, FILM COATED ORAL
Qty: 45 TABLET | Refills: 1 | Status: SHIPPED | OUTPATIENT
Start: 2023-11-06 | End: 2024-01-19 | Stop reason: SDUPTHER

## 2023-11-12 DIAGNOSIS — B35.9 TINEA: ICD-10-CM

## 2023-11-13 RX ORDER — NYSTATIN 100000 [USP'U]/G
POWDER TOPICAL
Qty: 60 G | Refills: 1 | Status: SHIPPED | OUTPATIENT
Start: 2023-11-13

## 2023-11-15 ENCOUNTER — OFFICE VISIT (OUTPATIENT)
Dept: SURGERY | Facility: CLINIC | Age: 53
End: 2023-11-15
Payer: MEDICAID

## 2023-11-15 VITALS
HEIGHT: 66 IN | SYSTOLIC BLOOD PRESSURE: 148 MMHG | BODY MASS INDEX: 41.13 KG/M2 | HEART RATE: 105 BPM | DIASTOLIC BLOOD PRESSURE: 83 MMHG

## 2023-11-15 DIAGNOSIS — L72.0 EPIDERMAL CYST: Primary | ICD-10-CM

## 2023-11-15 DIAGNOSIS — I10 ESSENTIAL HYPERTENSION: Chronic | ICD-10-CM

## 2023-11-15 PROCEDURE — 3079F DIAST BP 80-89 MM HG: CPT | Mod: CPTII,,, | Performed by: SURGERY

## 2023-11-15 PROCEDURE — 3008F PR BODY MASS INDEX (BMI) DOCUMENTED: ICD-10-PCS | Mod: CPTII,,, | Performed by: SURGERY

## 2023-11-15 PROCEDURE — 99212 OFFICE O/P EST SF 10 MIN: CPT | Mod: S$PBB,,, | Performed by: SURGERY

## 2023-11-15 PROCEDURE — 4010F ACE/ARB THERAPY RXD/TAKEN: CPT | Mod: CPTII,,, | Performed by: SURGERY

## 2023-11-15 PROCEDURE — 3046F PR MOST RECENT HEMOGLOBIN A1C LEVEL > 9.0%: ICD-10-PCS | Mod: CPTII,,, | Performed by: SURGERY

## 2023-11-15 PROCEDURE — 1159F PR MEDICATION LIST DOCUMENTED IN MEDICAL RECORD: ICD-10-PCS | Mod: CPTII,,, | Performed by: SURGERY

## 2023-11-15 PROCEDURE — 3079F PR MOST RECENT DIASTOLIC BLOOD PRESSURE 80-89 MM HG: ICD-10-PCS | Mod: CPTII,,, | Performed by: SURGERY

## 2023-11-15 PROCEDURE — 99999 PR PBB SHADOW E&M-EST. PATIENT-LVL IV: ICD-10-PCS | Mod: PBBFAC,,, | Performed by: SURGERY

## 2023-11-15 PROCEDURE — 3046F HEMOGLOBIN A1C LEVEL >9.0%: CPT | Mod: CPTII,,, | Performed by: SURGERY

## 2023-11-15 PROCEDURE — 4010F PR ACE/ARB THEARPY RXD/TAKEN: ICD-10-PCS | Mod: CPTII,,, | Performed by: SURGERY

## 2023-11-15 PROCEDURE — 3077F SYST BP >= 140 MM HG: CPT | Mod: CPTII,,, | Performed by: SURGERY

## 2023-11-15 PROCEDURE — 99214 OFFICE O/P EST MOD 30 MIN: CPT | Mod: PBBFAC | Performed by: SURGERY

## 2023-11-15 PROCEDURE — 3008F BODY MASS INDEX DOCD: CPT | Mod: CPTII,,, | Performed by: SURGERY

## 2023-11-15 PROCEDURE — 99999 PR PBB SHADOW E&M-EST. PATIENT-LVL IV: CPT | Mod: PBBFAC,,, | Performed by: SURGERY

## 2023-11-15 PROCEDURE — 3077F PR MOST RECENT SYSTOLIC BLOOD PRESSURE >= 140 MM HG: ICD-10-PCS | Mod: CPTII,,, | Performed by: SURGERY

## 2023-11-15 PROCEDURE — 1159F MED LIST DOCD IN RCRD: CPT | Mod: CPTII,,, | Performed by: SURGERY

## 2023-11-15 PROCEDURE — 99212 PR OFFICE/OUTPT VISIT, EST, LEVL II, 10-19 MIN: ICD-10-PCS | Mod: S$PBB,,, | Performed by: SURGERY

## 2023-11-15 RX ORDER — METOPROLOL TARTRATE 50 MG/1
50 TABLET ORAL 2 TIMES DAILY
Qty: 180 TABLET | Refills: 2 | OUTPATIENT
Start: 2023-11-15

## 2023-11-15 NOTE — PROGRESS NOTES
52 y/o with scalp lesion here for wound check.    PE: healed, no sign of residual infection or cyst at this time    F/u prn

## 2023-11-16 NOTE — TELEPHONE ENCOUNTER
Refill Decision Note   Audrey Hehuey  is requesting a refill authorization.  Brief Assessment and Rationale for Refill:  Quick Discontinue     Medication Therapy Plan:  Decreased to 25mg (1/4/23); Quick dc      Comments:     Note composed:8:27 PM 11/15/2023

## 2023-11-16 NOTE — TELEPHONE ENCOUNTER
No care due was identified.  Health [facility] Embedded Care Due Messages. Reference number: [id number].   11/15/2023 8:10:28 PM CST

## 2023-11-19 DIAGNOSIS — K21.00 GASTROESOPHAGEAL REFLUX DISEASE WITH ESOPHAGITIS WITHOUT HEMORRHAGE: ICD-10-CM

## 2023-11-19 RX ORDER — PANTOPRAZOLE SODIUM 40 MG/1
40 TABLET, DELAYED RELEASE ORAL
Qty: 90 TABLET | Refills: 3 | Status: SHIPPED | OUTPATIENT
Start: 2023-11-19

## 2023-11-19 NOTE — TELEPHONE ENCOUNTER
No care due was identified.  Health [facility] Embedded Care Due Messages. Reference number: [id number].   11/19/2023 5:56:28 AM CST

## 2023-11-19 NOTE — TELEPHONE ENCOUNTER
Refill Decision Note   Audrey Natarajan  is requesting a refill authorization.  Brief Assessment and Rationale for Refill:  Approve     Medication Therapy Plan:         Comments:     Note composed:11:40 AM 11/19/2023

## 2023-11-20 DIAGNOSIS — G54.6 PHANTOM LIMB PAIN: ICD-10-CM

## 2023-11-20 DIAGNOSIS — I10 ESSENTIAL HYPERTENSION: ICD-10-CM

## 2023-11-20 RX ORDER — TRAMADOL HYDROCHLORIDE 50 MG/1
50 TABLET ORAL EVERY 8 HOURS PRN
Qty: 30 TABLET | Refills: 0 | Status: SHIPPED | OUTPATIENT
Start: 2023-11-20 | End: 2023-12-26

## 2023-11-20 RX ORDER — METOPROLOL TARTRATE 25 MG/1
25 TABLET, FILM COATED ORAL 2 TIMES DAILY
Qty: 180 TABLET | Refills: 3 | Status: SHIPPED | OUTPATIENT
Start: 2023-11-20

## 2023-11-20 NOTE — TELEPHONE ENCOUNTER
----- Message from Lupe Jhaveri sent at 11/20/2023  9:07 AM CST -----  .Type: RX Refill Request    Who Called: Self     Have you contacted your pharmacy: No     Refill or New Rx: Refill     RX Name and Strength:metoprolol tartrate (LOPRESSOR) 25 MG tablet    traMADoL (ULTRAM) 50 mg tablet    Preferred Pharmacy with phone number: .  [id number] [address] [facility] [address]  Phone: [phone] Fax: [phone]    Local or Mail Order: Local     Ordering Provider: Becky    Would the patient rather a call back or a response via [facility]? Call Back     Best Call Back Number: .[phone] (home)       Additional Information:

## 2023-11-20 NOTE — TELEPHONE ENCOUNTER
Refill Routing Note   Medication(s) are not appropriate for processing by [facility] for the following reason(s):        Outside of protocol  Required vitals abnormal  No active prescription written by provider    [facility] action(s):  Defer  Route               Appointments  past 12m or future 3m with PCP    Date Provider   Last Visit   10/19/2023 Donaldo Pena MD   Next Visit   1/19/2024 Donaldo Pena MD   ED visits in past 90 days: 0        Note composed:3:00 PM 11/20/2023

## 2023-11-20 NOTE — TELEPHONE ENCOUNTER
No care due was identified.  Health [facility] Embedded Care Due Messages. Reference number: [id number].   11/20/2023 9:17:46 AM CST

## 2023-11-27 ENCOUNTER — CLINICAL SUPPORT (OUTPATIENT)
Dept: REHABILITATION | Facility: HOSPITAL | Age: 53
End: 2023-11-27
Payer: MEDICAID

## 2023-11-27 DIAGNOSIS — Z74.09 IMPAIRED FUNCTIONAL MOBILITY, BALANCE, GAIT, AND ENDURANCE: Primary | ICD-10-CM

## 2023-11-27 PROCEDURE — 97110 THERAPEUTIC EXERCISES: CPT | Mod: PN

## 2023-11-27 NOTE — PROGRESS NOTES
"[facility]   Physical Therapy Updated Plan of Care    Name: Audrey Nataarjan  Clinic Number: [id number]    Therapy Diagnosis:   No diagnosis found.      Physician: Gabe Munoz MD    Visit Date: 11/27/2023    Physician Orders: PT Eval and Treat, Post surgical   Medical Diagnosis from Referral: L97.423 (ICD-10-CM) - Left midfoot ulcer, with necrosis of muscle  Evaluation Date: 7/6/2023  Authorization Period Expiration: 12/31/2023  Plan of Care Expiration: 10/27/2023   Extended from 11/27/2023 to 1/30/2024  Visit # / Visits authorized: 17/20    Precautions: Standard, Diabetes, and Fall    Time In: 4:15  Time Out: 5:00  Total Billable Time: 45 minutes (30 billable)    SUBJECTIVE     Audrey reports gap in therapy sessions due to car being in the shop, hospital admission due to cyst, and schedule. She also reports that her rheumatoid arthritis began to flare up a few weeks ago, and has prevented her from donning prosthetic device since then. She has seen her physician for this; but per her report, medical treatment will help with pain but not so much with swelling. She reports that this happens every year with weather changes but resolves once the weather becomes more consistent. She reports she is currently "just waiting for the swelling to go down once the weather stops changing". See below assessment for therapist's recommendations.      She was compliant with home exercise program.  Response to previous treatment: No change   Functional change: None    Pain: 7/10  Location: left knee    OBJECTIVE     Most exam elements unable to be tested due to rheumatoid arthritis status and patient unable use prosthetic device     Postural control:  MCTSIB:  1. Eyes Open/feet together/Firm: 30 seconds  2. Eyes Closed/feet together/Firm: 26 seconds (previous: 6 seconds) 11/27/23: Tested with single RLE stance: 9 seconds  3. Eyes Open/feet together/Foam: NT    4. Eyes Closed/feet together/Foam: NT "      Gait Assessment:   - AD used: SPC/no AD  - Assistance: S  - Distance: 70 ft  - Curb: NT  - Ramp:  NT  - Stairs: NT     GAIT DEVIATIONS:  Gait component performance:     11/27/23: Unable to test  Reliance of UE on RW  Decreased step length on R LE   Decreased gait speed  Decreased stance time on L LE  * Above impairments indicate decreased gait safety and increased fall risk.          Evaluation 9/27/23 11/27/23   Timed Up and Go 26+24+27 =   25.6 sec  < 20 sec safe for independent transfers,     < 30 sec assist required for transfers 19.3 seconds Unable to test   6 meter walk test NT NT Unable to test      Timed Up and Go fall risk:   Community dwelling older adults >13.5 sec   Chronic CVA >14 sec   Geriatric with h/o falls >15 sec   Frail elderly >32.6 sec    LE amputees >19 sec   PD >7.95- 11.5 sec   Hip OA >10 sec   Vestibular >11.1 sec       TREATMENT     Audrey received the treatments listed below:       received therapeutic exercises to develop strength and ROM for 30 minutes including:     -re-exam: recent history, lower limb palpation/assessment, and testing element attempts described above  -LLE strength assessment     -discussion of prosthetic device management and physical therapy continued plan of care     -trials of attempting to don prosthetic device     -R single-leg stance, eyes open and closed- multiple trials between 3 to 9 seconds     -tall kneeling on block with foam pad:   -with equal left-right weight-bearing- x 4 minutes   -with left biased  (LLE back)- x 3:30    *rest breaks provided throughout     PATIENT EDUCATION AND HOME EXERCISES     Home Exercises Provided and Patient Education Provided     Education provided:   - standing HEP  - continuing to wear prosthesis for 1 hour at a time, taking off for one hour    Written Home Exercises Provided: Patient instructed to cont prior HEP. Exercises were reviewed and Audrey was able to demonstrate them prior to the end of the session.  Audrey  demonstrated good  understanding of the education provided. See EMR under Patient Instructions for exercises provided during therapy sessions    ASSESSMENT     Mrs. Natarajan has demonstrated excellent progress with skilled therapy. She still exhibits maintained gains; but current ability is temporarily limited by patient being unable to don prosthetic device due to rheumatoid arthritis flare-up. Prosthetist to be contacted for possible temporary adjustments to device. Patient demonstrates good compliance with home exercises and activities to her ability without full left lower extremity. Previous achievements in balance and gait expected to persist once patient is able to resume using prosthetic device. She will benefit from continued skilled physical therapy to further progress toward independent daily function.    Audrey Is progressing well towards her goals.   Pt prognosis is Good.     Pt will continue to benefit from skilled outpatient physical therapy to address the deficits listed in the problem list box on initial evaluation, provide pt/family education and to maximize pt's level of independence in the home and community environment.     Pt's spiritual, cultural and educational needs considered and pt agreeable to plan of care and goals.     Anticipated barriers to physical therapy: none    Status as of 11/27/23   Short Term Goals- 4 Weeks  Pt to demonstrate independence in performance of HEP in order to improve daily level of physical activity and improve carry over session to session. Met  Pt to improve B LE strength by > / = 1/2 MMT score in order to improve strength for daily activities. Met  Pt to score < / = 22 seconds pm TUG in order to decrease fall risk and maximize functional strength for daily activities.Ongoing  Pt to score > / = 19 on Tinetti in order to improve safety and balance during gait.Ongoing  Pt to demo gait for > 200 ft with SPC in order to promote return to independence in home and  community. Improving       Long Term Goals - 12 Weeks  Pt to demonstrate compliance with HEP > / = 3x per week in order to improve daily level of physical activity and improve carry over session to session. Met  Pt to improve B LE strength by > / = 1 MMT score in order to improve strength for daily activities. Met  Pt to demonstrate TUG score of < / = 18 seconds in order to decrease fall risk and maximize functional strength for daily activities.Ongoing  Pt to score > / = 24 on Tinetti in order to improve safety and balance during gait.Ongoing  Pt to demo gait for > 200 ft without AD in order to promote return to independence in home and community. Ongoing  Pt to demonstrate ability to balance for > / = 30 seconds on all MCTSIB conditions with no sway in order to improve vestibular response and decrease fall risk.  Ongoing    PLAN     Continue PT 1 to 2 times per week: extended to 1/30/2024   (later date due to delays of prosthetic device adjustments and schedule)     Continue gait training and dynamic balance    Zoe Dumont, PT, DPT  11/27/2023

## 2023-11-30 NOTE — PLAN OF CARE
"[facility]   Physical Therapy Updated Plan of Care    Name: Audrey Natarajan  Clinic Number: [id number]    Therapy Diagnosis:   No diagnosis found.      Physician: Gabe Munoz MD    Visit Date: 11/27/2023    Physician Orders: PT Eval and Treat, Post surgical   Medical Diagnosis from Referral: L97.423 (ICD-10-CM) - Left midfoot ulcer, with necrosis of muscle  Evaluation Date: 7/6/2023  Authorization Period Expiration: 12/31/2023  Plan of Care Expiration: 10/27/2023   Extended from 11/27/2023 to 1/30/2024  Visit # / Visits authorized: 17/20    Precautions: Standard, Diabetes, and Fall    Time In: 4:15  Time Out: 5:00  Total Billable Time: 45 minutes (30 billable)    SUBJECTIVE     Audrey reports gap in therapy sessions due to car being in the shop, hospital admission due to cyst, and schedule. She also reports that her rheumatoid arthritis began to flare up a few weeks ago, and has prevented her from donning prosthetic device since then. She has seen her physician for this; but per her report, medical treatment will help with pain but not so much with swelling. She reports that this happens every year with weather changes but resolves once the weather becomes more consistent. She reports she is currently "just waiting for the swelling to go down once the weather stops changing". See below assessment for therapist's recommendations.      She was compliant with home exercise program.  Response to previous treatment: No change   Functional change: None    Pain: 7/10  Location: left knee    OBJECTIVE     Most exam elements unable to be tested due to rheumatoid arthritis status and patient unable use prosthetic device     Postural control:  MCTSIB:  1. Eyes Open/feet together/Firm: 30 seconds  2. Eyes Closed/feet together/Firm: 26 seconds (previous: 6 seconds) 11/27/23: Tested with single RLE stance: 9 seconds  3. Eyes Open/feet together/Foam: NT    4. Eyes Closed/feet together/Foam: NT "      Gait Assessment:   - AD used: SPC/no AD  - Assistance: S  - Distance: 70 ft  - Curb: NT  - Ramp:  NT  - Stairs: NT     GAIT DEVIATIONS:  Gait component performance:     11/27/23: Unable to test  Reliance of UE on RW  Decreased step length on R LE   Decreased gait speed  Decreased stance time on L LE  * Above impairments indicate decreased gait safety and increased fall risk.          Evaluation 9/27/23 11/27/23   Timed Up and Go 26+24+27 =   25.6 sec  < 20 sec safe for independent transfers,     < 30 sec assist required for transfers 19.3 seconds Unable to test   6 meter walk test NT NT Unable to test      Timed Up and Go fall risk:   Community dwelling older adults >13.5 sec   Chronic CVA >14 sec   Geriatric with h/o falls >15 sec   Frail elderly >32.6 sec    LE amputees >19 sec   PD >7.95- 11.5 sec   Hip OA >10 sec   Vestibular >11.1 sec       TREATMENT     Audrey received the treatments listed below:       received therapeutic exercises to develop strength and ROM for 30 minutes including:     -re-exam: recent history, lower limb palpation/assessment, and testing element attempts described above  -LLE strength assessment     -discussion of prosthetic device management and physical therapy continued plan of care     -trials of attempting to don prosthetic device     -R single-leg stance, eyes open and closed- multiple trials between 3 to 9 seconds     -tall kneeling on block with foam pad:   -with equal left-right weight-bearing- x 4 minutes   -with left biased  (LLE back)- x 3:30    *rest breaks provided throughout     PATIENT EDUCATION AND HOME EXERCISES     Home Exercises Provided and Patient Education Provided     Education provided:   - standing HEP  - continuing to wear prosthesis for 1 hour at a time, taking off for one hour    Written Home Exercises Provided: Patient instructed to cont prior HEP. Exercises were reviewed and Audrey was able to demonstrate them prior to the end of the session.  Audrey  demonstrated good  understanding of the education provided. See EMR under Patient Instructions for exercises provided during therapy sessions    ASSESSMENT     Mrs. Natarajan has demonstrated excellent progress with skilled therapy. She still exhibits maintained gains; but current ability is temporarily limited by patient being unable to don prosthetic device due to rheumatoid arthritis flare-up. Prosthetist to be contacted for possible temporary adjustments to device. Patient demonstrates good compliance with home exercises and activities to her ability without full left lower extremity. Previous achievements in balance and gait expected to persist once patient is able to resume using prosthetic device. She will benefit from continued skilled physical therapy to further progress toward independent daily function.    Audrey Is progressing well towards her goals.   Pt prognosis is Good.     Pt will continue to benefit from skilled outpatient physical therapy to address the deficits listed in the problem list box on initial evaluation, provide pt/family education and to maximize pt's level of independence in the home and community environment.     Pt's spiritual, cultural and educational needs considered and pt agreeable to plan of care and goals.     Anticipated barriers to physical therapy: none    Status as of 11/27/23   Short Term Goals- 4 Weeks  Pt to demonstrate independence in performance of HEP in order to improve daily level of physical activity and improve carry over session to session. Met  Pt to improve B LE strength by > / = 1/2 MMT score in order to improve strength for daily activities. Met  Pt to score < / = 22 seconds pm TUG in order to decrease fall risk and maximize functional strength for daily activities.Ongoing  Pt to score > / = 19 on Tinetti in order to improve safety and balance during gait.Ongoing  Pt to demo gait for > 200 ft with SPC in order to promote return to independence in home and  community. Improving       Long Term Goals - 12 Weeks  Pt to demonstrate compliance with HEP > / = 3x per week in order to improve daily level of physical activity and improve carry over session to session. Met  Pt to improve B LE strength by > / = 1 MMT score in order to improve strength for daily activities. Met  Pt to demonstrate TUG score of < / = 18 seconds in order to decrease fall risk and maximize functional strength for daily activities.Ongoing  Pt to score > / = 24 on Tinetti in order to improve safety and balance during gait.Ongoing  Pt to demo gait for > 200 ft without AD in order to promote return to independence in home and community. Ongoing  Pt to demonstrate ability to balance for > / = 30 seconds on all MCTSIB conditions with no sway in order to improve vestibular response and decrease fall risk.  Ongoing    PLAN     Continue PT 1 to 2 times per week: extended to 1/30/2024   (later date due to delays of prosthetic device adjustments and schedule)     Continue gait training and dynamic balance    Zoe Dumont, PT, DPT  11/27/2023

## 2023-12-08 ENCOUNTER — TELEPHONE (OUTPATIENT)
Dept: FAMILY MEDICINE | Facility: CLINIC | Age: 53
End: 2023-12-08
Payer: MEDICAID

## 2023-12-08 NOTE — TELEPHONE ENCOUNTER
----- Message from Donaldo Pena MD sent at 12/8/2023  1:36 PM CST -----  Regarding: diabetic shoe orders  Please fax notes and signed order in my outbox

## 2023-12-13 ENCOUNTER — OFFICE VISIT (OUTPATIENT)
Dept: PODIATRY | Facility: CLINIC | Age: 53
End: 2023-12-13
Payer: MEDICAID

## 2023-12-13 VITALS
WEIGHT: 254.88 LBS | HEIGHT: 66 IN | SYSTOLIC BLOOD PRESSURE: 97 MMHG | HEART RATE: 75 BPM | BODY MASS INDEX: 40.96 KG/M2 | DIASTOLIC BLOOD PRESSURE: 66 MMHG

## 2023-12-13 DIAGNOSIS — M20.5X1 HALLUX LIMITUS, ACQUIRED, RIGHT: ICD-10-CM

## 2023-12-13 DIAGNOSIS — M20.41 HAMMER TOE OF RIGHT FOOT: ICD-10-CM

## 2023-12-13 DIAGNOSIS — E11.49 TYPE II DIABETES MELLITUS WITH NEUROLOGICAL MANIFESTATIONS: Primary | ICD-10-CM

## 2023-12-13 DIAGNOSIS — M19.071 ARTHROSIS OF MIDFOOT, RIGHT: ICD-10-CM

## 2023-12-13 DIAGNOSIS — Z89.512 S/P UNILATERAL BKA (BELOW KNEE AMPUTATION), LEFT: ICD-10-CM

## 2023-12-13 DIAGNOSIS — Z86.718 HISTORY OF DVT (DEEP VEIN THROMBOSIS): ICD-10-CM

## 2023-12-13 DIAGNOSIS — R60.0 EDEMA OF RIGHT FOOT: ICD-10-CM

## 2023-12-13 PROCEDURE — 99215 OFFICE O/P EST HI 40 MIN: CPT | Mod: PBBFAC,PO | Performed by: PODIATRIST

## 2023-12-13 PROCEDURE — 3078F PR MOST RECENT DIASTOLIC BLOOD PRESSURE < 80 MM HG: ICD-10-PCS | Mod: CPTII,,, | Performed by: PODIATRIST

## 2023-12-13 PROCEDURE — 3078F DIAST BP <80 MM HG: CPT | Mod: CPTII,,, | Performed by: PODIATRIST

## 2023-12-13 PROCEDURE — 99214 OFFICE O/P EST MOD 30 MIN: CPT | Mod: S$PBB,,, | Performed by: PODIATRIST

## 2023-12-13 PROCEDURE — 4010F ACE/ARB THERAPY RXD/TAKEN: CPT | Mod: CPTII,,, | Performed by: PODIATRIST

## 2023-12-13 PROCEDURE — 4010F PR ACE/ARB THEARPY RXD/TAKEN: ICD-10-PCS | Mod: CPTII,,, | Performed by: PODIATRIST

## 2023-12-13 PROCEDURE — 3046F PR MOST RECENT HEMOGLOBIN A1C LEVEL > 9.0%: ICD-10-PCS | Mod: CPTII,,, | Performed by: PODIATRIST

## 2023-12-13 PROCEDURE — 3046F HEMOGLOBIN A1C LEVEL >9.0%: CPT | Mod: CPTII,,, | Performed by: PODIATRIST

## 2023-12-13 PROCEDURE — 99999 PR PBB SHADOW E&M-EST. PATIENT-LVL V: CPT | Mod: PBBFAC,,, | Performed by: PODIATRIST

## 2023-12-13 PROCEDURE — 1159F PR MEDICATION LIST DOCUMENTED IN MEDICAL RECORD: ICD-10-PCS | Mod: CPTII,,, | Performed by: PODIATRIST

## 2023-12-13 PROCEDURE — 1160F PR REVIEW ALL MEDS BY PRESCRIBER/CLIN PHARMACIST DOCUMENTED: ICD-10-PCS | Mod: CPTII,,, | Performed by: PODIATRIST

## 2023-12-13 PROCEDURE — 1159F MED LIST DOCD IN RCRD: CPT | Mod: CPTII,,, | Performed by: PODIATRIST

## 2023-12-13 PROCEDURE — 3008F BODY MASS INDEX DOCD: CPT | Mod: CPTII,,, | Performed by: PODIATRIST

## 2023-12-13 PROCEDURE — 3008F PR BODY MASS INDEX (BMI) DOCUMENTED: ICD-10-PCS | Mod: CPTII,,, | Performed by: PODIATRIST

## 2023-12-13 PROCEDURE — 3074F SYST BP LT 130 MM HG: CPT | Mod: CPTII,,, | Performed by: PODIATRIST

## 2023-12-13 PROCEDURE — 1160F RVW MEDS BY RX/DR IN RCRD: CPT | Mod: CPTII,,, | Performed by: PODIATRIST

## 2023-12-13 PROCEDURE — 99999 PR PBB SHADOW E&M-EST. PATIENT-LVL V: ICD-10-PCS | Mod: PBBFAC,,, | Performed by: PODIATRIST

## 2023-12-13 PROCEDURE — 99214 PR OFFICE/OUTPT VISIT, EST, LEVL IV, 30-39 MIN: ICD-10-PCS | Mod: S$PBB,,, | Performed by: PODIATRIST

## 2023-12-13 PROCEDURE — 3074F PR MOST RECENT SYSTOLIC BLOOD PRESSURE < 130 MM HG: ICD-10-PCS | Mod: CPTII,,, | Performed by: PODIATRIST

## 2023-12-13 NOTE — PATIENT INSTRUCTIONS
Over the counter pain creams: Voltaren Gel, Biofreeze, Bengay, tiger balm, two old goat, lidocaine gel,  Absorbine Veterinary Liniment Gel Topical Analgesic Sore Muscle and Joint Pain Relief    Recommend lotions: eucerin, eucerin for diabetics, aquaphor, A&D ointment, gold bond for diabetics, sween, Premier's Bees all purpose baby ointment,  urea 40 with aloe or SkinIntegra rapid crack repair (found on amazon.com)    Shoe recommendations: (try 6pm.com, zappos.com , nordstromrack.Eyevensys, or shoes.com for discounted prices) you can visit varsity shoes in [place], DSW shoes in [place]  or za rack in the [facility] (there are also several shoe brand outlets in the [facility])    ONLY purchase stability style tennis shoes NO flex, foam, free, yoga mat style shoes    Shoe examples:    Asics (GT 4000 or gel foundations), new balance stability type shoes (such as the 940 series), saucony (stabil c3),  Shi (GTS or Beast or   transcend), propet, HokaOne (tennis shoe) Júnior (tennis shoes and boots)    Melidaft Gregory (women) Steven&Tee (men), clarks, crocs, aerosoles, naturalizers, SAS, ecco, born, jason gurrola, rockports (dress shoes)    Vionic, burkenstocks, fitflops, propet, taos, baretraps (sandals)    HokaOne sandals, crocs, propet (house shoes)      Nail Home remedy:  Vicks Vapor rub or Emuaid to nails for easier manageability    Diabetes: Inspecting Your Feet  Diabetes increases your chances of developing foot problems. So inspect your feet every day. This helps you find small skin irritations before they become serious infections. If you have trouble seeing the bottoms of your feet, use a mirror or ask a family member or friend to help.     Pressure spots on the bottom of the foot are common areas where problems develop.   How to check your feet  Below are tips to help you look for foot problems. Try to check your feet at the same time each day, such as when you get out of bed in the morning:  Check the top of  each foot. The tops of toes, back of the heel, and outer edge of the foot can get a lot of rubbing from poor-fitting shoes.  Check the bottom of each foot. Daily wear and tear often leads to problems at pressure spots.  Check the toes and nails. Fungal infections often occur between toes. Toenail problems can also be a sign of fungal infections or lead to breaks in the skin.  Check your shoes, too. Loose objects inside a shoe can injure the foot. Use your hand to feel inside your shoes for things like tami, loose stitching, or rough areas that could irritate your skin.  Warning signs  Look for any color changes in the foot. Redness with streaks can signal a severe infection, which needs immediate medical attention. Tell your doctor right away if you have any of these problems:  Swelling, sometimes with color changes, may be a sign of poor blood flow or infection. Symptoms include tenderness and an increase in the size of your foot.  Warm or hot areas on your feet may be signs of infection. A foot that is cold may not be getting enough blood.  Sensations such as burning, tingling, or pins and needles can be signs of a problem. Also check for areas that may be numb.  Hot spots are caused by friction or pressure. Look for hot spots in areas that get a lot of rubbing. Hot spots can turn into blisters, calluses, or sores.  Cracks and sores are caused by dry or irritated skin. They are a sign that the skin is breaking down, which can lead to infection.  Toenail problems to watch for include nails growing into the skin (ingrown toenail) and causing redness or pain. Thick, yellow, or discolored nails can signal a fungal infection.  Drainage and odor can develop from untreated sores and ulcers. Call your doctor right away if you notice white or yellow drainage, bleeding, or unpleasant odor.   © 0799-3162 [employer]. [address] [place] [address], [place] [address]. All rights reserved. This information is not  intended as a substitute for professional medical care. Always follow your healthcare professional's instructions.        Step-by-Step:  Inspecting Your Feet (Diabetes)    Date Last Reviewed: 10/1/2016  © 9893-7492 [employer]. [address] [place] [address], [place] [address]. All rights reserved. This information is not intended as a substitute for professional medical care. Always follow your healthcare professional's instructions.

## 2023-12-13 NOTE — PROGRESS NOTES
"[facility]ry Progress Note    Subjective:       Patient ID: Audrey Natarajan is a 53 y.o. female.    Chief Complaint: Diabetes Mellitus (10/19/23 Dr. Mathur) and Foot Pain    To clinic on scooter which he states inside her trailer at all times. She relates that due to significant left knee pain she has not been able to use knee scooter as intended and has been riding scooter seated. She says that she "pees in a bucket in bedroom as BR is 45 feet away and only go there when I poop". States spends all day in bed. When asked if continues to smoke she said yes and goes outside to smoke. Has almost finished abx from hospital which she was only given for 10 days. Her family and friends do all cooking and shopping for her but wound is bigger by cm length and width with only one tunnel felt at 1100 and has now doubled in size to 4 cm.     9/13/2023 Patient presents to clinic complaining of right foot pain and edema. Following initiation of use of her left leg prosthetic she began to have right midfoot and medial hindfoot pain.  She has been wearing flexible  tennis shoes.  Currently in physical therapy.     12/30/2023 patient returns to clinic for evaluation treatment of high risk foot.  She relates that she was unable to get her compression socks filled due to requested weight.  She relates her rx shoes pending.  She relates she has been able to get up on her prosthetic due to edema.  She continues to have midfoot pain.       Review of Systems   Constitutional:  Positive for activity change and fatigue. Negative for appetite change and fever.   Respiratory:  Positive for cough (smoker). Negative for shortness of breath.    Cardiovascular:  Positive for leg swelling. Negative for chest pain.   Gastrointestinal:  Negative for diarrhea, nausea and vomiting.   Musculoskeletal:  Positive for arthralgias and myalgias.        Left posterior knee pain and swelling   Skin:  Positive for color change " "and wound.   Neurological:  Positive for numbness. Negative for weakness.        + paresthesia          Objective:     Vitals:    12/13/23 0955   BP: 97/66   Pulse: 75   Weight: 115.6 kg (254 lb 13.6 oz)   Height: 5' 6" (1.676 m)   PainSc:   6            Physical Exam  Nursing note reviewed.   Constitutional:       General: She is not in acute distress.     Appearance: She is not toxic-appearing or diaphoretic.   Cardiovascular:      Pulses:           Dorsalis pedis pulses are 1+ on the right side.        Posterior tibial pulses are 2+ on the right side.   Pulmonary:      Effort: No respiratory distress.   Musculoskeletal:      Right lower leg: Edema present.      Right ankle: Swelling present. No lateral malleolus, medial malleolus, AITF ligament, CF ligament or posterior TF ligament tenderness. Decreased range of motion.      Right Achilles Tendon: No defects. Paniagua's test negative.      Left ankle: No lateral malleolus, medial malleolus, AITF ligament, CF ligament, posterior TF ligament or proximal fibula tenderness.      Right foot: Swelling and tenderness present.      Comments: There is equinus deformity RLE with decreased dorsiflexion at the ankle joint bilateral    Decreased first MPJ range of motion both weightbearing and nonweightbearing, no crepitus observed the first MP joint, + dorsal flag sign (right).     Patient has hammertoes of digits 2-5 RLE partially reducible without symptom today.        Left Lower Extremity: Left leg is amputated below knee.   Skin:     General: Skin is warm and dry.      Coloration: Skin is not pale.      Findings: Erythema present. No laceration.      Nails: There is no clubbing.   Neurological:      Sensory: No sensory deficit.      Motor: No tremor, atrophy or abnormal muscle tone.      Deep Tendon Reflexes: Reflexes are normal and symmetric.      Comments: Paresthesias, and hyperesthesia bilateral feet at toes with no clearly identified trigger or " source.    [place]-Gil[place]to 5.07 monofilament is intact bilateral feet. Sharp/dull sensation is also intact Bilateral feet.   Psychiatric:         Attention and Perception: She is attentive.         Mood and Affect: Mood is not anxious. Affect is not inappropriate.         Speech: She is communicative. Speech is not slurred.         Behavior: Behavior is not combative.         EXAMINATION:  XR FOOT COMPLETE 3 VIEW RIGHT     CLINICAL HISTORY:  . Pain in right foot     TECHNIQUE:  Weightbearing AP, lateral, and oblique views of the right foot were performed.     COMPARISON:  05/04/2022.     FINDINGS:  There is a linear lucency involving the base of the distal phalanx of the right great toe with intra-articular extension suggestive of a small nondisplaced fracture.  The remainder of the bones appear intact.  There is healed fracture deformity of the distal aspect of the right 4th metatarsal bone.  There are degenerative changes within the small joints of the foot with no bony erosions evident.  Calcaneal spurs are noted at the insertions of the Achilles tendon and plantar fascia.  No radiopaque soft tissue foreign bodies are evident.     Impression:     Linear lucency involving the base of the distal phalanx of the right great toe suggestive of a nondisplaced fracture.  Correlation with physical findings is recommended.     Healed fracture deformity of the distal right 4th metatarsal bone.     Degenerative changes.     Calcaneal spurs.        Assessment:           ICD-10-CM ICD-9-CM   1. Type II diabetes mellitus with neurological manifestations  E11.49 250.60   2. Arthrosis of midfoot, right  M19.071 715.97   3. Edema of right foot  R60.0 782.3   4. S/P unilateral BKA (below knee amputation), left  Z89.512 V49.75   5. Hallux limitus, acquired, right  M20.5X1 735.8   6. Hammer toe of right foot  M20.41 735.4   7. History of DVT (deep vein thrombosis)  Z86.718 V12.51                    Plan:            Patient education  on the chronic nature of arthralgias of the feet. Discussed non-surgical treatment options, including injection, supportive shoegear, inserts.     Patient instructed on adequate icing techniques. Patient should ice the affected area at least 10 minutes when inflammed. I advised the patient that extra icing would also be beneficial to ensure adequate anti inflammatory effect. I gave written and verbal instructions on stretching exercises. Patient expressed understanding. Discussed  wearing appropriate shoe gear and avoiding flats, slippers, sandals, and going barefoot. My recommendation for OTC supports is [employer]. Rx diabetic shoes for protection and support     Thigh-high compression socks prescription, changed to 20-30 mmHg. Tubigrips to RLE; patient is to elevate. When sleeping, place a pillow under lower extremity. When sitting, support the leg at level with the waist.    We also discussed cortisone injections and NSAID therapy.      Education about the diabetic foot, neuropathy, and prevention of further limb loss.    Shoe inspection. Diabetic Foot Education. Patient reminded of the importance of good nutrition/healthy diet/weight management and blood sugar control to help prevent podiatric complications of diabetes. Patient instructed on proper foot hygeine. Wear comfortable, proper fitting shoes. Wash feet daily. Dry well. After drying, apply moisturizer to feet (no lotion to webspaces). Inspect feet daily for skin breaks, blisters, swelling, or redness. Wear cotton socks (preferably white)  Change socks every day. Do NOT walk barefoot. Do NOT use heating pads or hot water soaks. We discussed wearing proper shoe gear, daily foot inspections, never walking without protective shoe gear.     Discussed edema control and the importance of daily moisturizer     She will continue to monitor the areas daily, inspect her feet, wear protective shoe gear when ambulatory, moisturizer to maintain skin  integrity and follow in this office in approximately 2-3 months, sooner p.r.n.        Orders Placed This Encounter   Procedures    COMPRESSION STOCKINGS     Thigh high     Order Specific Question:   Pressure amount:     Answer:   20-30 mmHg        No follow-ups on file.

## 2023-12-19 DIAGNOSIS — E11.42 TYPE 2 DIABETES MELLITUS WITH DIABETIC POLYNEUROPATHY, WITH LONG-TERM CURRENT USE OF INSULIN: ICD-10-CM

## 2023-12-19 DIAGNOSIS — Z79.4 TYPE 2 DIABETES MELLITUS WITH DIABETIC POLYNEUROPATHY, WITH LONG-TERM CURRENT USE OF INSULIN: ICD-10-CM

## 2023-12-19 RX ORDER — BLOOD-GLUCOSE METER
EACH MISCELLANEOUS
Qty: 1 EACH | Refills: 0 | Status: SHIPPED | OUTPATIENT
Start: 2023-12-19

## 2023-12-19 RX ORDER — LANCETS
EACH MISCELLANEOUS
Qty: 300 EACH | Refills: 3 | Status: SHIPPED | OUTPATIENT
Start: 2023-12-19

## 2023-12-19 NOTE — TELEPHONE ENCOUNTER
Refill Decision Note   Audrey Natarajan  is requesting a refill authorization.  Brief Assessment and Rationale for Refill:  Approve     Medication Therapy Plan:         Comments:     Note composed:10:50 AM 12/19/2023

## 2023-12-19 NOTE — TELEPHONE ENCOUNTER
No care due was identified.  [facility] Embedded Care Due Messages. Reference number: [id number].   12/19/2023 5:59:49 AM CST

## 2023-12-20 ENCOUNTER — HOSPITAL ENCOUNTER (EMERGENCY)
Facility: HOSPITAL | Age: 53
Discharge: HOME | End: 2023-12-20
Attending: STUDENT IN AN ORGANIZED HEALTH CARE EDUCATION/TRAINING PROGRAM
Payer: MEDICAID

## 2023-12-20 VITALS
RESPIRATION RATE: 18 BRPM | HEART RATE: 70 BPM | SYSTOLIC BLOOD PRESSURE: 134 MMHG | DIASTOLIC BLOOD PRESSURE: 60 MMHG | TEMPERATURE: 98 F | BODY MASS INDEX: 36.96 KG/M2 | HEIGHT: 66 IN | OXYGEN SATURATION: 94 % | WEIGHT: 230 LBS

## 2023-12-20 DIAGNOSIS — L03.211 FACIAL CELLULITIS: Primary | ICD-10-CM

## 2023-12-20 DIAGNOSIS — E11.49 TYPE II DIABETES MELLITUS WITH NEUROLOGICAL MANIFESTATIONS: ICD-10-CM

## 2023-12-20 DIAGNOSIS — R73.9 HYPERGLYCEMIA: ICD-10-CM

## 2023-12-20 LAB
ALBUMIN SERPL BCP-MCNC: 3.4 G/DL (ref 3.5–5.2)
ALLENS TEST: ABNORMAL
ALP SERPL-CCNC: 77 U/L (ref 55–135)
ALT SERPL W/O P-5'-P-CCNC: 15 U/L (ref 10–44)
ANION GAP SERPL CALC-SCNC: 12 MMOL/L (ref 8–16)
AST SERPL-CCNC: 14 U/L (ref 10–40)
B-OH-BUTYR BLD STRIP-SCNC: 0.1 MMOL/L (ref 0–0.5)
BACTERIA #/AREA URNS HPF: NORMAL /HPF
BASOPHILS # BLD AUTO: 0.13 K/UL (ref 0–0.2)
BASOPHILS NFR BLD: 1 % (ref 0–1.9)
BILIRUB SERPL-MCNC: 0.2 MG/DL (ref 0.1–1)
BILIRUB UR QL STRIP: NEGATIVE
BUN SERPL-MCNC: 7 MG/DL (ref 6–20)
CALCIUM SERPL-MCNC: 9.1 MG/DL (ref 8.7–10.5)
CHLORIDE SERPL-SCNC: 94 MMOL/L (ref 95–110)
CLARITY UR: CLEAR
CO2 SERPL-SCNC: 31 MMOL/L (ref 23–29)
COLOR UR: YELLOW
CREAT SERPL-MCNC: 0.8 MG/DL (ref 0.5–1.4)
DELSYS: ABNORMAL
DIFFERENTIAL METHOD: ABNORMAL
EOSINOPHIL # BLD AUTO: 0.3 K/UL (ref 0–0.5)
EOSINOPHIL NFR BLD: 1.8 % (ref 0–8)
ERYTHROCYTE [DISTWIDTH] IN BLOOD BY AUTOMATED COUNT: 18.2 % (ref 11.5–14.5)
EST. GFR  (NO RACE VARIABLE): >60 ML/MIN/1.73 M^2
GLUCOSE SERPL-MCNC: 323 MG/DL (ref 70–110)
GLUCOSE UR QL STRIP: ABNORMAL
HCO3 UR-SCNC: 34.1 MMOL/L (ref 24–28)
HCT VFR BLD AUTO: 41.7 % (ref 37–48.5)
HGB BLD-MCNC: 13.3 G/DL (ref 12–16)
HGB UR QL STRIP: NEGATIVE
IMM GRANULOCYTES # BLD AUTO: 0.18 K/UL (ref 0–0.04)
IMM GRANULOCYTES NFR BLD AUTO: 1.3 % (ref 0–0.5)
KETONES UR QL STRIP: ABNORMAL
LEUKOCYTE ESTERASE UR QL STRIP: ABNORMAL
LYMPHOCYTES # BLD AUTO: 6.8 K/UL (ref 1–4.8)
LYMPHOCYTES NFR BLD: 49.8 % (ref 18–48)
MCH RBC QN AUTO: 25.9 PG (ref 27–31)
MCHC RBC AUTO-ENTMCNC: 31.9 G/DL (ref 32–36)
MCV RBC AUTO: 81 FL (ref 82–98)
MICROSCOPIC COMMENT: NORMAL
MONOCYTES # BLD AUTO: 1.2 K/UL (ref 0.3–1)
MONOCYTES NFR BLD: 9 % (ref 4–15)
NEUTROPHILS # BLD AUTO: 5.1 K/UL (ref 1.8–7.7)
NEUTROPHILS NFR BLD: 37.1 % (ref 38–73)
NITRITE UR QL STRIP: NEGATIVE
NRBC BLD-RTO: 0 /100 WBC
PCO2 BLDA: 55.8 MMHG (ref 35–45)
PH SMN: 7.39 [PH] (ref 7.35–7.45)
PH UR STRIP: 6 [PH] (ref 5–8)
PLATELET # BLD AUTO: 400 K/UL (ref 150–450)
PMV BLD AUTO: 10.8 FL (ref 9.2–12.9)
PO2 BLDA: 26 MMHG (ref 40–60)
POC BE: 7 MMOL/L
POC SATURATED O2: 47 % (ref 95–100)
POC TCO2: 36 MMOL/L (ref 24–29)
POCT GLUCOSE: 238 MG/DL (ref 70–110)
POCT GLUCOSE: 430 MG/DL (ref 70–110)
POTASSIUM SERPL-SCNC: 3.7 MMOL/L (ref 3.5–5.1)
PROT SERPL-MCNC: 6.5 G/DL (ref 6–8.4)
PROT UR QL STRIP: NEGATIVE
RBC # BLD AUTO: 5.13 M/UL (ref 4–5.4)
RBC #/AREA URNS HPF: 1 /HPF (ref 0–4)
SAMPLE: ABNORMAL
SITE: ABNORMAL
SODIUM SERPL-SCNC: 137 MMOL/L (ref 136–145)
SP GR UR STRIP: >1.03 (ref 1–1.03)
URN SPEC COLLECT METH UR: ABNORMAL
UROBILINOGEN UR STRIP-ACNC: NEGATIVE EU/DL
WBC # BLD AUTO: 13.67 K/UL (ref 3.9–12.7)
WBC #/AREA URNS HPF: 3 /HPF (ref 0–5)
YEAST URNS QL MICRO: NORMAL

## 2023-12-20 PROCEDURE — 81000 URINALYSIS NONAUTO W/SCOPE: CPT | Performed by: NURSE PRACTITIONER

## 2023-12-20 PROCEDURE — 99285 EMERGENCY DEPT VISIT HI MDM: CPT | Mod: 25

## 2023-12-20 PROCEDURE — 82010 KETONE BODYS QUAN: CPT | Performed by: NURSE PRACTITIONER

## 2023-12-20 PROCEDURE — 85025 COMPLETE CBC W/AUTO DIFF WBC: CPT | Performed by: NURSE PRACTITIONER

## 2023-12-20 PROCEDURE — 96375 TX/PRO/DX INJ NEW DRUG ADDON: CPT | Mod: 59

## 2023-12-20 PROCEDURE — 96361 HYDRATE IV INFUSION ADD-ON: CPT

## 2023-12-20 PROCEDURE — 80048 BASIC METABOLIC PNL TOTAL CA: CPT | Mod: XB

## 2023-12-20 PROCEDURE — 82962 GLUCOSE BLOOD TEST: CPT

## 2023-12-20 PROCEDURE — 82803 BLOOD GASES ANY COMBINATION: CPT

## 2023-12-20 PROCEDURE — 93010 ELECTROCARDIOGRAM REPORT: CPT | Mod: ,,, | Performed by: INTERNAL MEDICINE

## 2023-12-20 PROCEDURE — 25000003 PHARM REV CODE 250: Performed by: NURSE PRACTITIONER

## 2023-12-20 PROCEDURE — 93005 ELECTROCARDIOGRAM TRACING: CPT

## 2023-12-20 PROCEDURE — 99900035 HC TECH TIME PER 15 MIN (STAT)

## 2023-12-20 PROCEDURE — 96374 THER/PROPH/DIAG INJ IV PUSH: CPT

## 2023-12-20 PROCEDURE — 93010 EKG 12-LEAD: ICD-10-PCS | Mod: ,,, | Performed by: INTERNAL MEDICINE

## 2023-12-20 PROCEDURE — 25500020 PHARM REV CODE 255: Performed by: STUDENT IN AN ORGANIZED HEALTH CARE EDUCATION/TRAINING PROGRAM

## 2023-12-20 PROCEDURE — 25000003 PHARM REV CODE 250: Performed by: STUDENT IN AN ORGANIZED HEALTH CARE EDUCATION/TRAINING PROGRAM

## 2023-12-20 PROCEDURE — 63600175 PHARM REV CODE 636 W HCPCS: Performed by: STUDENT IN AN ORGANIZED HEALTH CARE EDUCATION/TRAINING PROGRAM

## 2023-12-20 PROCEDURE — 80053 COMPREHEN METABOLIC PANEL: CPT | Performed by: NURSE PRACTITIONER

## 2023-12-20 RX ORDER — SULFAMETHOXAZOLE AND TRIMETHOPRIM 800; 160 MG/1; MG/1
1 TABLET ORAL
Status: COMPLETED | OUTPATIENT
Start: 2023-12-20 | End: 2023-12-20

## 2023-12-20 RX ORDER — KETOROLAC TROMETHAMINE 30 MG/ML
15 INJECTION, SOLUTION INTRAMUSCULAR; INTRAVENOUS
Status: COMPLETED | OUTPATIENT
Start: 2023-12-20 | End: 2023-12-20

## 2023-12-20 RX ORDER — METOCLOPRAMIDE HYDROCHLORIDE 5 MG/ML
10 INJECTION INTRAMUSCULAR; INTRAVENOUS
Status: COMPLETED | OUTPATIENT
Start: 2023-12-20 | End: 2023-12-20

## 2023-12-20 RX ORDER — DIPHENHYDRAMINE HYDROCHLORIDE 50 MG/ML
25 INJECTION INTRAMUSCULAR; INTRAVENOUS
Status: COMPLETED | OUTPATIENT
Start: 2023-12-20 | End: 2023-12-20

## 2023-12-20 RX ORDER — HYDROMORPHONE HYDROCHLORIDE 1 MG/ML
0.5 INJECTION, SOLUTION INTRAMUSCULAR; INTRAVENOUS; SUBCUTANEOUS
Status: DISCONTINUED | OUTPATIENT
Start: 2023-12-20 | End: 2023-12-20

## 2023-12-20 RX ORDER — SULFAMETHOXAZOLE AND TRIMETHOPRIM 800; 160 MG/1; MG/1
1 TABLET ORAL 2 TIMES DAILY
Qty: 14 TABLET | Refills: 0 | Status: SHIPPED | OUTPATIENT
Start: 2023-12-20 | End: 2024-01-19 | Stop reason: ALTCHOICE

## 2023-12-20 RX ADMIN — SULFAMETHOXAZOLE AND TRIMETHOPRIM 1 TABLET: 800; 160 TABLET ORAL at 09:12

## 2023-12-20 RX ADMIN — DIPHENHYDRAMINE HYDROCHLORIDE 25 MG: 50 INJECTION, SOLUTION INTRAMUSCULAR; INTRAVENOUS at 06:12

## 2023-12-20 RX ADMIN — IOHEXOL 75 ML: 350 INJECTION, SOLUTION INTRAVENOUS at 07:12

## 2023-12-20 RX ADMIN — KETOROLAC TROMETHAMINE 15 MG: 30 INJECTION, SOLUTION INTRAMUSCULAR; INTRAVENOUS at 06:12

## 2023-12-20 RX ADMIN — SODIUM CHLORIDE 1000 ML: 9 INJECTION, SOLUTION INTRAVENOUS at 06:12

## 2023-12-20 RX ADMIN — METOCLOPRAMIDE 10 MG: 5 INJECTION, SOLUTION INTRAMUSCULAR; INTRAVENOUS at 06:12

## 2023-12-20 NOTE — ED TRIAGE NOTES
"Pt to the ED with complaints of possible cellulitis to her right temple since Saturday. Pt states she was bit by a mosquito to the area and now the wound is "hard and warm". No drainage noted. Pt denies fever/chills. Pt reports having cellulitis to the top of her head 1 month ago.  "

## 2023-12-21 NOTE — ED PROVIDER NOTES
Encounter Date: 12/20/2023       History     Chief Complaint   Patient presents with    Cellulitis     Pt to ER with reports of right sided face cellulitis x few days. Pt reports right sided ear and jaw pain.       53-year-old female with a history diabetes, left BKA, bipolar disorder presents with right-sided facial erythema and pain for the last few days.  She said that she recently was diagnosed with cellulitis however completed a course of antibiotics with improvement.  No ocular pain, diplopia, vision loss or pain when she looks around the room.  No vomiting although does endorse nausea.  No fevers.  Denies intravenous drug abuse.  No headache, neck pain or neck stiffness.      Review of patient's allergies indicates:   Allergen Reactions    Morphine Other (See Comments)     Patient had a psychotic episode after taking Morphine  Agitation, hallucinations  Other Reaction(s): Other (See Comments), Other (See Comments)    Patient had a psychotic episode after taking Morphine    Patient had a psychotic episode after taking Morphine Agitation, hallucinations    Penicillins Anaphylaxis     Tolerated cephalosporins in the past    Januvia [sitagliptin] Hives    Carbamazepine Other (See Comments)     hyponatremia  Other Reaction(s): Other (See Comments)    hyponatremia     Past Medical History:   Diagnosis Date    ADHD (attention deficit hyperactivity disorder)     Arthritis     Asthma     Bipolar 1 disorder     Cataract     Cigarette smoker     COPD (chronic obstructive pulmonary disease)     Coronary artery disease     A fib    Depression     bipolar manic depresson    Diabetes mellitus     Diabetic foot ulcers     Diabetic neuropathy     DVT of lower extremity, bilateral 07/2013    bilateral LE DVT. [place] filter placed.     Encounter for blood transfusion     History of blood clots 1. Left Leg=2003; 2.Bilateral Groin=Blood Clots= 5 or 6/ 2013 & 7/2013; 3. LLL of Lung=7/2013;  4. Lt. Lower Leg=7/2013.     Pt. had  "1st Blood Clot after Pwioxvgbvcgt=9160, & Last=2013. [place] Filter= Rt.Lateral Neck.    HTN (hypertension) 06/06/2013    Pt states that she does not have hypertension    Hypercholesteremia     Irregular heartbeat     Neuromuscular disorder     neuropathy feet    Obese     PE (pulmonary embolism) 07/2013    bilat LE DVT.     Restless leg syndrome      Past Surgical History:   Procedure Laterality Date    ABDOMINAL SURGERY  2010    gastric sleeve    BELOW KNEE AMPUTATION OF LOWER EXTREMITY Left 4/19/2023    Procedure: AMPUTATION, BELOW KNEE;  Surgeon: Gabe Munoz MD;  Location: [facility] OR;  Service: General;  Laterality: Left;  RN PREOP 4/11/2023    BILATERAL OOPHORECTOMY Bilateral 1/12/2015    CHOLECYSTECTOMY      DEBRIDEMENT OF FOOT Bilateral 5/10/2022    Procedure: DEBRIDEMENT, FOOT;  Surgeon: Maira De Los Santos DPM;  Location: [facility] OR;  Service: Podiatry;  Laterality: Bilateral;    DEBRIDEMENT OF FOOT Left 2/28/2023    Procedure: DEBRIDEMENT, FOOT,biopsy;  Surgeon: Maira De Los Santos DPM;  Location: [facility] OR;  Service: Podiatry;  Laterality: Left;  request misonix, wound VAC, possible neoxx    Green' s filter Right 7/4/2012    Right Neck & Tunneled Down.    HERNIA REPAIR      "[place] of Hernias Repaires around th Belly Button.", pt. states    INCISION AND DRAINAGE FOOT Left 12/24/2022    Procedure: INCISION AND DRAINAGE, FOOT;  Surgeon: Fahad Razo DPM;  Location: [facility] OR;  Service: Podiatry;  Laterality: Left;    LAPAROSCOPIC CHOLECYSTECTOMY N/A 9/10/2020    Procedure: CHOLECYSTECTOMY, LAPAROSCOPIC;  Surgeon: Montrell Gutierrez MD;  Location: [facility] OR;  Service: General;  Laterality: N/A;  RN PREOP 9/9----COVID Negative  9/9    OVARIAN CYST REMOVAL  3/13/2014    MT REMOVAL OF OVARY/TUBE(S)      SPLENECTOMY, TOTAL  July 2003    TONSILLECTOMY      as a child    TYMPANOSTOMY TUBE PLACEMENT  1976    VEIN SURGERY  2003    Lt leg     Family History   Problem Relation Age of Onset    Hypertension Father     Diabetes Father  "    Heart disease Father     Cataracts Father     Diabetes Paternal Grandfather     Heart disease Paternal Grandfather     No Known Problems Mother     Ovarian cancer Maternal Grandmother          from this. ? age     No Known Problems Sister     No Known Problems Brother     No Known Problems Maternal Aunt     No Known Problems Maternal Uncle     No Known Problems Paternal Aunt     No Known Problems Paternal Uncle     No Known Problems Maternal Grandfather     Ovarian cancer Paternal Grandmother     Uterine cancer Neg Hx     Breast cancer Neg Hx     Colon cancer Neg Hx     Amblyopia Neg Hx     Blindness Neg Hx     Cancer Neg Hx     Glaucoma Neg Hx     Macular degeneration Neg Hx     Retinal detachment Neg Hx     Strabismus Neg Hx     Stroke Neg Hx     Thyroid disease Neg Hx      Social History     Tobacco Use    Smoking status: Former     Current packs/day: 0.00     Average packs/day: 0.5 packs/day for 37.0 years (18.5 ttl pk-yrs)     Types: Cigarettes     Start date: 1983     Quit date: 2020     Years since quitting: 3.0    Smokeless tobacco: Current    Tobacco comments:     Enrolled in the [employer] on 5/3/14 ([facility] Member ID # [id number]). Ambulatory referral to Smoking Cessation Program   Substance Use Topics    Alcohol use: No     Alcohol/week: 0.0 standard drinks of alcohol    Drug use: No     Review of Systems    Physical Exam     Initial Vitals [23 1610]   BP Pulse Resp Temp SpO2   (!) 144/67 84 18 98.3 °F (36.8 °C) 97 %      MAP       --         Physical Exam    Nursing note and vitals reviewed.  Constitutional:   Watching a movie on the phone, no acute distress   HENT:   Head: Normocephalic and atraumatic.   Eyes: EOM are normal. Pupils are equal, round, and reactive to light.   Neck: Neck supple. No crepitus.   Normal range of motion.  Cardiovascular:  Normal rate, regular rhythm, normal heart sounds and intact distal pulses.     Exam reveals no S3.       No murmur  heard.  Pulmonary/Chest: Breath sounds normal. No respiratory distress.   Abdominal: Abdomen is soft. Bowel sounds are normal. She exhibits no pulsatile midline mass.   Musculoskeletal:         General: No tenderness or edema. Normal range of motion.      Cervical back: Normal, normal range of motion and neck supple. No deformity, tenderness, bony tenderness or crepitus.      Thoracic back: Normal. No deformity, tenderness or bony tenderness.      Lumbar back: Normal. No deformity, tenderness or bony tenderness. Negative right straight leg raise test and negative left straight leg raise test.     Neurological: She is alert and oriented to person, place, and time. She has normal strength and normal reflexes. She displays normal reflexes. GCS score is 15. GCS eye subscore is 4. GCS verbal subscore is 5. GCS motor subscore is 6.   Skin: Skin is warm and dry. Capillary refill takes less than 2 seconds.   Area of erythema surrounding what appears to be insect bites to the lateral aspect of the orbital rim.  Tender with palpation.  No fluctuance or crepitus.  No proptosis.  No hyphema.  No discomfort with extraocular muscle movement testing.   Psychiatric: She has a normal mood and affect. Thought content normal.         ED Course   Procedures  Labs Reviewed   CBC W/ AUTO DIFFERENTIAL - Abnormal; Notable for the following components:       Result Value    WBC 13.67 (*)     MCV 81 (*)     MCH 25.9 (*)     MCHC 31.9 (*)     RDW 18.2 (*)     Immature Granulocytes 1.3 (*)     Immature Grans (Abs) 0.18 (*)     Lymph # 6.8 (*)     Mono # 1.2 (*)     Gran % 37.1 (*)     Lymph % 49.8 (*)     All other components within normal limits   COMPREHENSIVE METABOLIC PANEL - Abnormal; Notable for the following components:    Chloride 94 (*)     CO2 31 (*)     Glucose 323 (*)     Albumin 3.4 (*)     All other components within normal limits   URINALYSIS, REFLEX TO URINE CULTURE - Abnormal; Notable for the following components:    Specific  Gravity, UA >1.030 (*)     Glucose, UA 4+ (*)     Ketones, UA 1+ (*)     Leukocytes, UA Trace (*)     All other components within normal limits    Narrative:     Specimen Source->Urine   POCT GLUCOSE - Abnormal; Notable for the following components:    POCT Glucose 430 (*)     All other components within normal limits   ISTAT PROCEDURE - Abnormal; Notable for the following components:    POC PCO2 55.8 (*)     POC PO2 26 (*)     POC HCO3 34.1 (*)     POC BE 7 (*)     POC TCO2 36 (*)     All other components within normal limits   POCT GLUCOSE - Abnormal; Notable for the following components:    POCT Glucose 238 (*)     All other components within normal limits   BETA - HYDROXYBUTYRATE, SERUM   URINALYSIS MICROSCOPIC    Narrative:     Specimen Source->Urine   POCT URINE PREGNANCY   POCT GLUCOSE MONITORING CONTINUOUS          Imaging Results              CT ORBITS WITH CONTRAST (Final result)  Result time 12/20/23 21:12:08      Final result by Halle Gruber MD (12/20/23 21:12:08)                   Impression:      Mild right facial cellulitis.  No abscess.  No abnormality of the orbits.      Electronically signed by: Halle Gruber  Date:    12/20/2023  Time:    21:12               Narrative:    EXAMINATION:  CT ORBITS WITH CONTRAST    CLINICAL HISTORY:  Orbital cellulitis suspected.  Reportedly bit by mosquito.    TECHNIQUE:  1.25 mm axial images were obtained through the orbits.  Coronal and sagittal reformatted images were provided.  75 cc Omnipaque was injected.    COMPARISON:  10/06/2023    FINDINGS:  Bilaterally, the optic globes and orbital contents are within normal limits. The optic lenses are normally located. Extraocular musculature and retroconal fat are within normal limits.  In the paranasal sinuses, there is mild bilateral maxillary sinus mucoperiosteal thickening.  There is mild thickening of the skin with underlying infiltration in the subcutaneous fat in the region of the right temple.  There  are no rim enhancing fluid collections to suggest abscess.  The visualized calvarium is also intact.                                       X-Ray Chest AP Portable (Final result)  Result time 12/20/23 17:14:47      Final result by Shaq Hill MD (12/20/23 17:14:47)                   Impression:      Unremarkable examination.      Electronically signed by: Shaq Hill  Date:    12/20/2023  Time:    17:14               Narrative:    EXAMINATION:  XR CHEST AP PORTABLE    CLINICAL HISTORY:  hyperglycemia;    TECHNIQUE:  Single frontal view of the chest was performed.    COMPARISON:  Chest radiograph 03/04/2023    FINDINGS:  Lines and tubes: None.    Heart and mediastinum: Unremarkable.    Pleura: No pleural effusion or pneumothorax.    Lungs: Lungs are well inflated. No focal consolidations or evidence of pulmonary edema.    Soft tissue/bone: Unremarkable.                                       Medications   sulfamethoxazole-trimethoprim 800-160mg per tablet 1 tablet (has no administration in time range)   sodium chloride 0.9% bolus 1,000 mL 1,000 mL (0 mLs Intravenous Stopped 12/20/23 2041)   metoclopramide injection 10 mg (10 mg Intravenous Given 12/20/23 1827)   diphenhydrAMINE injection 25 mg (25 mg Intravenous Given 12/20/23 1827)   ketorolac injection 15 mg (15 mg Intravenous Given 12/20/23 1827)   iohexoL (OMNIPAQUE 350) injection 75 mL (75 mLs Intravenous Given 12/20/23 1914)     Medical Decision Making  Hemodynamically stable. Afebrile. Phonating and protecting the airway spontaneously. No clinical evidence for cardiovascular instability or impending airway compromise. Examination as above. Additional historians include family at bedside. Prior medical records reviewed. As per ED course. Current co-morbidities considered that will impact clinical decision making include as above.    Plan:  Labs, CT orbit w/ contrast, abx.       Amount and/or Complexity of Data Reviewed  Labs: ordered.  Radiology:  ordered.    Risk  Prescription drug management.               ED Course as of 12/20/23 2139   Wed Dec 20, 2023   1810 Recent family medicine note reviewed.  Patient was treated for preseptal cellulitis in did receive doxycycline with improvement. [BG]   1848 Twelve lead EKG per my independent interpretation reveals normal sinus rhythm at a rate of 74.  Normal axis, normal interval.  No regional ST segment elevation. [BG]   2016 Laboratory studies reviewed.  CBC with mild leukocytosis of 13.67.  CMP negative.  Hyperglycemia is improving.  Urinalysis reviewed.  Labs overall not concerning for diabetic ketoacidosis.  Chest x-ray reviewed. [BG]   2130 CT orbit reviewed. Will provide abx - pt was on doxy before, will switch to bactrim. Will refer to ophtho.     The patient was reassessed and on subsequent re-evaluation, they were subjectively feeling better. They were resting comfortably and in no acute distress. I discussed the laboratory and diagnostic findings with the patient. Education was provided and all questions were answered. As discussed, they were recommended to follow up with their primary care physician within the next few days and to return to the emergency department sooner for any new or worsening. They acknowledged and verbalized agreement to the treatment plan. The patient was discharged home in stable condition.     DISCLAIMER: This note was prepared with [employer] voice recognition transcription software. Garbled syntax, mangled pronouns, and other bizarre constructions may be attributed to that software system.     [BG]      ED Course User Index  [BG] Levy Baird MD                Patient was notified instructed to return emergently and quickly for fever due to the fact that she may need intravenous antibiotics.  Additional referrals include Dermatology, Endocrinology due to uncontrolled sugars.           Clinical Impression:  Final diagnoses:  [R73.9] Hyperglycemia  [L03.211] Facial cellulitis  (Primary)  [E11.49] Type II diabetes mellitus with neurological manifestations          ED Disposition Condition    Discharge Stable          ED Prescriptions       Medication Sig Dispense Start Date End Date Auth. Provider    sulfamethoxazole-trimethoprim 800-160mg (BACTRIM DS) 800-160 mg Tab Take 1 tablet by mouth 2 (two) times daily. for 7 days 14 tablet 12/20/2023 12/27/2023 Levy Baidr MD          Follow-up Information       Follow up With Specialties Details Why Contact Info    Donaldo Pena MD Internal Medicine, Wound Care Go to  As needed [address] [facility] [address]  [phone]               Levy Baird MD  12/20/23 8347

## 2023-12-21 NOTE — DISCHARGE INSTRUCTIONS
Thank you for choosing [facility]! We appreciate you trusting us with your medical care.     It is important to remember that some problems are difficult to diagnose and may not be found during your first visit. Be sure to follow up with your primary care doctor and review any labs/imaging that was performed during your visit with them. If you do not have a primary care doctor, you may contact the one listed on your discharge paperwork, or you may also call the [facility] Appointment Desk at [phone] to schedule an appointment.     All medications may potentially have side effects and it is impossible to predict which medications may give you side effects. If you feel that you are having a negative effect of any medication you should immediately stop taking them and seek medical attention. Do not drive or make any important decisions for 24 hours if you have received any pain medications, sedatives or mood altering drugs during your ER visit.    We will be happy to take care of you for all of your future medical needs. You may return to the ER at any time for any new/concerning symptoms, worsening condition, or failure to improve. We hope you feel better soon.     Levy Baird MD  Emergency Medicine Physician

## 2023-12-23 DIAGNOSIS — G54.6 PHANTOM LIMB PAIN: ICD-10-CM

## 2023-12-23 NOTE — TELEPHONE ENCOUNTER
Care Due:                  Date            Visit Type   Department     Provider  --------------------------------------------------------------------------------                                [facility]                              PRIMARY      MEDICINE/  Last Visit: 10-      CARE ([facility])   INTERNAL MED   Donaldo Pena  Next Visit: None Scheduled  None         None Found                                                            Last  Test          Frequency    Reason                     Performed    Due Date  --------------------------------------------------------------------------------    Lipid Panel.  12 months..  pravastatin..............  Not Found    Overdue    [employer] Embedded Care Due Messages. Reference number: [id number].   12/23/2023 1:28:52 PM CST

## 2023-12-24 NOTE — TELEPHONE ENCOUNTER
No care due was identified.  [facility] Embedded Care Due Messages. Reference number: [id number].   12/24/2023 4:25:54 AM CST

## 2023-12-26 RX ORDER — TRAMADOL HYDROCHLORIDE 50 MG/1
50 TABLET ORAL EVERY 8 HOURS PRN
Qty: 30 TABLET | Refills: 0 | Status: SHIPPED | OUTPATIENT
Start: 2023-12-26 | End: 2024-01-19 | Stop reason: SDUPTHER

## 2023-12-26 RX ORDER — LIDOCAINE 50 MG/G
PATCH TOPICAL DAILY
Qty: 15 PATCH | Refills: 0 | Status: SHIPPED | OUTPATIENT
Start: 2023-12-26 | End: 2024-02-12

## 2023-12-28 ENCOUNTER — TELEPHONE (OUTPATIENT)
Dept: FAMILY MEDICINE | Facility: CLINIC | Age: 53
End: 2023-12-28
Payer: MEDICAID

## 2023-12-28 NOTE — TELEPHONE ENCOUNTER
----- Message from Bessy Fish sent at 12/28/2023 12:14 PM CST -----  Regarding: self [phone]  Type:  Mammogram    Caller is requesting to schedule their annual mammogram appointment.  Order is not listed in EPIC.  Please enter order and contact patient to schedule.  Name of Caller:     Where would they like the mammogram performed? [facility]    Would the patient rather a call back or a response via [facility]? Call back     Best Call Back Number: [phone]

## 2024-01-01 NOTE — DISCHARGE INSTRUCTIONS
Your lab tests are within acceptable ranges.  Your symptoms may be related to gallstones or to your spine problems.  Continue your medications as you have been prescribed.  Schedule close follow-up with your primary physician as well as your surgeon.  You have been prescribed a short supply of Norco to use for severe pain not controlled by other medications.  Return to the emergency department for fever, uncontrollable nausea, vomiting or any new, worsening or significantly concerning symptoms.   [Normal Growth] : growth [Normal Development] : development  [No Elimination Concerns] : elimination [Continue Regimen] : feeding [No Skin Concerns] : skin [Normal Sleep Pattern] : sleep [None] : no medical problems [Anticipatory Guidance Given] : Anticipatory guidance addressed as per the history of present illness section [Family Functioning] : family functioning [Nutritional Adequacy and Growth] : nutritional adequacy and growth [Infant Development] : infant development [Oral Health] : oral health [Safety] : safety [Age Approp Vaccines] : Age appropriate vaccines administered [No Medications] : ~He/She~ is not on any medications [Parent/Guardian] : Parent/Guardian [] : The components of the vaccine(s) to be administered today are listed in the plan of care. The disease(s) for which the vaccine(s) are intended to prevent and the risks have been discussed with the caretaker.  The risks are also included in the appropriate vaccination information statements which have been provided to the patient's caregiver.  The caregiver has given consent to vaccinate. [FreeTextEntry1] : Recommend breastfeeding, 8-12 feedings per day. Mother should continue prenatal vitamins and avoid alcohol. If formula is needed, recommend iron-fortified formulations, 2-4 oz every 3-4 hrs. Cereal may be introduced using a spoon and bowl. When in car, patient should be in rear-facing car seat in back seat. Put baby to sleep on back, in own crib with no loose or soft bedding. Lower crib matress. Help baby to maintain sleep and feeding routines. May offer pacifier if needed. Continue tummy time when awake. vaccines updated  solid food intro discussed at length  RTO 6 mo WC

## 2024-01-11 ENCOUNTER — PATIENT OUTREACH (OUTPATIENT)
Dept: ADMINISTRATIVE | Facility: HOSPITAL | Age: 54
End: 2024-01-11
Payer: MEDICAID

## 2024-01-11 DIAGNOSIS — E11.42 TYPE 2 DIABETES MELLITUS WITH DIABETIC POLYNEUROPATHY, WITHOUT LONG-TERM CURRENT USE OF INSULIN: Primary | Chronic | ICD-10-CM

## 2024-01-18 DIAGNOSIS — Z86.718 HISTORY OF DVT (DEEP VEIN THROMBOSIS): Primary | ICD-10-CM

## 2024-01-18 DIAGNOSIS — G54.6 PHANTOM LIMB PAIN: ICD-10-CM

## 2024-01-18 RX ORDER — GABAPENTIN 300 MG/1
600 CAPSULE ORAL 3 TIMES DAILY
Qty: 180 CAPSULE | Refills: 2 | Status: SHIPPED | OUTPATIENT
Start: 2024-01-18 | End: 2024-04-19 | Stop reason: SDUPTHER

## 2024-01-18 NOTE — TELEPHONE ENCOUNTER
Care Due:                  Date            Visit Type   Department     Provider  --------------------------------------------------------------------------------                                [facility]                              PRIMARY      MEDICINE/  Last Visit: 10-      CARE ([facility])   INTERNAL MED   Donaldo Pena                              [facility]                              PRIMARY      MEDICINE/  Next Visit: 01-      CARE ([facility])   INTERNAL MED   Donaldo Pena                                                            Last  Test          Frequency    Reason                     Performed    Due Date  --------------------------------------------------------------------------------    HBA1C.......  6 months...  insulin, metFORMIN,        10-   04-                             semaglutide..............    [employer] Embedded Care Due Messages. Reference number: [id number].   1/18/2024 6:02:42 AM CST

## 2024-01-19 ENCOUNTER — OFFICE VISIT (OUTPATIENT)
Dept: FAMILY MEDICINE | Facility: CLINIC | Age: 54
End: 2024-01-19
Payer: MEDICAID

## 2024-01-19 ENCOUNTER — LAB VISIT (OUTPATIENT)
Dept: LAB | Facility: HOSPITAL | Age: 54
End: 2024-01-19
Attending: INTERNAL MEDICINE
Payer: MEDICAID

## 2024-01-19 VITALS
HEART RATE: 93 BPM | OXYGEN SATURATION: 96 % | WEIGHT: 230 LBS | TEMPERATURE: 99 F | DIASTOLIC BLOOD PRESSURE: 70 MMHG | BODY MASS INDEX: 36.96 KG/M2 | SYSTOLIC BLOOD PRESSURE: 146 MMHG | HEIGHT: 66 IN

## 2024-01-19 DIAGNOSIS — Z12.11 SCREENING FOR COLON CANCER: ICD-10-CM

## 2024-01-19 DIAGNOSIS — Z86.718 HISTORY OF DVT (DEEP VEIN THROMBOSIS): ICD-10-CM

## 2024-01-19 DIAGNOSIS — G89.29 CHRONIC HAND PAIN, UNSPECIFIED LATERALITY: ICD-10-CM

## 2024-01-19 DIAGNOSIS — G54.6 PHANTOM LIMB PAIN: ICD-10-CM

## 2024-01-19 DIAGNOSIS — F31.9 BIPOLAR 1 DISORDER: ICD-10-CM

## 2024-01-19 DIAGNOSIS — M79.643 CHRONIC HAND PAIN, UNSPECIFIED LATERALITY: ICD-10-CM

## 2024-01-19 DIAGNOSIS — N76.0 ACUTE VAGINITIS: ICD-10-CM

## 2024-01-19 DIAGNOSIS — I10 ESSENTIAL HYPERTENSION: ICD-10-CM

## 2024-01-19 DIAGNOSIS — E11.42 TYPE 2 DIABETES MELLITUS WITH DIABETIC POLYNEUROPATHY, WITH LONG-TERM CURRENT USE OF INSULIN: ICD-10-CM

## 2024-01-19 DIAGNOSIS — Z79.4 TYPE 2 DIABETES MELLITUS WITH DIABETIC POLYNEUROPATHY, WITH LONG-TERM CURRENT USE OF INSULIN: Primary | ICD-10-CM

## 2024-01-19 DIAGNOSIS — Z89.512 S/P BKA (BELOW KNEE AMPUTATION) UNILATERAL, LEFT: ICD-10-CM

## 2024-01-19 DIAGNOSIS — Z79.4 TYPE 2 DIABETES MELLITUS WITH DIABETIC POLYNEUROPATHY, WITH LONG-TERM CURRENT USE OF INSULIN: ICD-10-CM

## 2024-01-19 DIAGNOSIS — E11.42 TYPE 2 DIABETES MELLITUS WITH DIABETIC POLYNEUROPATHY, WITH LONG-TERM CURRENT USE OF INSULIN: Primary | ICD-10-CM

## 2024-01-19 DIAGNOSIS — Z23 NEED FOR PNEUMOCOCCAL VACCINE: ICD-10-CM

## 2024-01-19 LAB
ALBUMIN SERPL BCP-MCNC: 3.5 G/DL (ref 3.5–5.2)
ALP SERPL-CCNC: 75 U/L (ref 55–135)
ALT SERPL W/O P-5'-P-CCNC: 16 U/L (ref 10–44)
ANION GAP SERPL CALC-SCNC: 14 MMOL/L (ref 8–16)
AST SERPL-CCNC: 12 U/L (ref 10–40)
BASOPHILS # BLD AUTO: 0.17 K/UL (ref 0–0.2)
BASOPHILS NFR BLD: 1.3 % (ref 0–1.9)
BILIRUB SERPL-MCNC: 0.2 MG/DL (ref 0.1–1)
BUN SERPL-MCNC: 8 MG/DL (ref 6–20)
CALCIUM SERPL-MCNC: 9.1 MG/DL (ref 8.7–10.5)
CHLORIDE SERPL-SCNC: 96 MMOL/L (ref 95–110)
CO2 SERPL-SCNC: 26 MMOL/L (ref 23–29)
CREAT SERPL-MCNC: 0.7 MG/DL (ref 0.5–1.4)
CRP SERPL-MCNC: 1.6 MG/L (ref 0–8.2)
DIFFERENTIAL METHOD BLD: ABNORMAL
EOSINOPHIL # BLD AUTO: 0.3 K/UL (ref 0–0.5)
EOSINOPHIL NFR BLD: 2.6 % (ref 0–8)
ERYTHROCYTE [DISTWIDTH] IN BLOOD BY AUTOMATED COUNT: 16.5 % (ref 11.5–14.5)
ERYTHROCYTE [SEDIMENTATION RATE] IN BLOOD BY WESTERGREN METHOD: 3 MM/HR (ref 0–20)
EST. GFR  (NO RACE VARIABLE): >60 ML/MIN/1.73 M^2
GIANT PLATELETS BLD QL SMEAR: PRESENT
GLUCOSE SERPL-MCNC: 250 MG/DL (ref 70–110)
HCT VFR BLD AUTO: 42 % (ref 37–48.5)
HGB BLD-MCNC: 13.2 G/DL (ref 12–16)
HOWELL-JOLLY BOD BLD QL SMEAR: ABNORMAL
IMM GRANULOCYTES # BLD AUTO: 0.18 K/UL (ref 0–0.04)
IMM GRANULOCYTES NFR BLD AUTO: 1.4 % (ref 0–0.5)
LYMPHOCYTES # BLD AUTO: 6.6 K/UL (ref 1–4.8)
LYMPHOCYTES NFR BLD: 51.2 % (ref 18–48)
MCH RBC QN AUTO: 26.1 PG (ref 27–31)
MCHC RBC AUTO-ENTMCNC: 31.4 G/DL (ref 32–36)
MCV RBC AUTO: 83 FL (ref 82–98)
MONOCYTES # BLD AUTO: 1.2 K/UL (ref 0.3–1)
MONOCYTES NFR BLD: 9.4 % (ref 4–15)
NEUTROPHILS # BLD AUTO: 4.4 K/UL (ref 1.8–7.7)
NEUTROPHILS NFR BLD: 34.1 % (ref 38–73)
NRBC BLD-RTO: 0 /100 WBC
OVALOCYTES BLD QL SMEAR: ABNORMAL
PLATELET # BLD AUTO: 370 K/UL (ref 150–450)
PLATELET BLD QL SMEAR: ABNORMAL
PMV BLD AUTO: 11.6 FL (ref 9.2–12.9)
POTASSIUM SERPL-SCNC: 4.5 MMOL/L (ref 3.5–5.1)
PROT SERPL-MCNC: 6.6 G/DL (ref 6–8.4)
RBC # BLD AUTO: 5.05 M/UL (ref 4–5.4)
SODIUM SERPL-SCNC: 136 MMOL/L (ref 136–145)
WBC # BLD AUTO: 12.85 K/UL (ref 3.9–12.7)

## 2024-01-19 PROCEDURE — 83036 HEMOGLOBIN GLYCOSYLATED A1C: CPT | Performed by: INTERNAL MEDICINE

## 2024-01-19 PROCEDURE — 85652 RBC SED RATE AUTOMATED: CPT | Performed by: INTERNAL MEDICINE

## 2024-01-19 PROCEDURE — 99214 OFFICE O/P EST MOD 30 MIN: CPT | Mod: S$PBB,,, | Performed by: INTERNAL MEDICINE

## 2024-01-19 PROCEDURE — 36415 COLL VENOUS BLD VENIPUNCTURE: CPT | Mod: PN | Performed by: INTERNAL MEDICINE

## 2024-01-19 PROCEDURE — 3072F LOW RISK FOR RETINOPATHY: CPT | Mod: CPTII,,, | Performed by: INTERNAL MEDICINE

## 2024-01-19 PROCEDURE — 3008F BODY MASS INDEX DOCD: CPT | Mod: CPTII,,, | Performed by: INTERNAL MEDICINE

## 2024-01-19 PROCEDURE — 1159F MED LIST DOCD IN RCRD: CPT | Mod: CPTII,,, | Performed by: INTERNAL MEDICINE

## 2024-01-19 PROCEDURE — 99999 PR PBB SHADOW E&M-EST. PATIENT-LVL V: CPT | Mod: PBBFAC,,, | Performed by: INTERNAL MEDICINE

## 2024-01-19 PROCEDURE — 1160F RVW MEDS BY RX/DR IN RCRD: CPT | Mod: CPTII,,, | Performed by: INTERNAL MEDICINE

## 2024-01-19 PROCEDURE — 99215 OFFICE O/P EST HI 40 MIN: CPT | Mod: PBBFAC,PN | Performed by: INTERNAL MEDICINE

## 2024-01-19 PROCEDURE — 3078F DIAST BP <80 MM HG: CPT | Mod: CPTII,,, | Performed by: INTERNAL MEDICINE

## 2024-01-19 PROCEDURE — 99999PBSHW PNEUMOCOCCAL CONJUGATE VACCINE 20-VALENT: Mod: PBBFAC,,,

## 2024-01-19 PROCEDURE — 90677 PCV20 VACCINE IM: CPT | Mod: PBBFAC,PN

## 2024-01-19 PROCEDURE — 85025 COMPLETE CBC W/AUTO DIFF WBC: CPT | Performed by: INTERNAL MEDICINE

## 2024-01-19 PROCEDURE — 3077F SYST BP >= 140 MM HG: CPT | Mod: CPTII,,, | Performed by: INTERNAL MEDICINE

## 2024-01-19 PROCEDURE — 86200 CCP ANTIBODY: CPT | Performed by: INTERNAL MEDICINE

## 2024-01-19 PROCEDURE — 86140 C-REACTIVE PROTEIN: CPT | Performed by: INTERNAL MEDICINE

## 2024-01-19 PROCEDURE — 80053 COMPREHEN METABOLIC PANEL: CPT | Performed by: INTERNAL MEDICINE

## 2024-01-19 RX ORDER — FLUCONAZOLE 150 MG/1
150 TABLET ORAL DAILY
Qty: 2 TABLET | Refills: 0 | Status: ON HOLD | OUTPATIENT
Start: 2024-01-19 | End: 2024-05-04

## 2024-01-19 RX ORDER — PRAVASTATIN SODIUM 40 MG/1
40 TABLET ORAL NIGHTLY
Qty: 90 TABLET | Refills: 3 | Status: SHIPPED | OUTPATIENT
Start: 2024-01-19 | End: 2024-04-23

## 2024-01-19 RX ORDER — METHOCARBAMOL 500 MG/1
TABLET, FILM COATED ORAL
Qty: 45 TABLET | Refills: 1 | Status: SHIPPED | OUTPATIENT
Start: 2024-01-19 | End: 2024-03-11

## 2024-01-19 RX ORDER — MELOXICAM 7.5 MG/1
7.5 TABLET ORAL DAILY
Qty: 30 TABLET | Refills: 1 | Status: SHIPPED | OUTPATIENT
Start: 2024-01-19 | End: 2024-05-24 | Stop reason: SDUPTHER

## 2024-01-19 RX ORDER — TRAMADOL HYDROCHLORIDE 50 MG/1
50 TABLET ORAL EVERY 8 HOURS PRN
Qty: 30 TABLET | Refills: 0 | Status: SHIPPED | OUTPATIENT
Start: 2024-01-19 | End: 2024-02-14

## 2024-01-19 NOTE — PROGRESS NOTES
"Subjective:       Patient ID: Audrey Natarajan is a 53 y.o. female.    Chief Complaint: Follow-up ( issues, possible yeast ) and Arthritis (Pain x2 months, pain 8)    F/u chronic conditions    HPI: 52 y/o w/ IDDM obesity recurrent DVT on DOAC PAD s/p left BKA presents alone for follow up. Shoulders/hands/hips stiff and swollen       Review of Systems   Constitutional:  Negative for activity change, appetite change, fatigue, fever and unexpected weight change.   HENT:  Negative for ear pain, rhinorrhea and sore throat.    Eyes:  Negative for discharge and visual disturbance.   Respiratory:  Negative for chest tightness, shortness of breath and wheezing.    Cardiovascular:  Negative for chest pain, palpitations and leg swelling.   Gastrointestinal:  Negative for abdominal pain, constipation and diarrhea.   Endocrine: Negative for cold intolerance and heat intolerance.   Genitourinary:  Negative for dysuria and hematuria.   Musculoskeletal:  Positive for arthralgias and back pain. Negative for joint swelling and neck stiffness.   Skin:  Negative for rash.   Neurological:  Negative for dizziness, syncope, weakness and headaches.   Psychiatric/Behavioral:  Negative for suicidal ideas.        Objective:     Vitals:    01/19/24 1044   BP: (!) 146/70   BP Location: Left arm   Patient Position: Sitting   BP Method: Large (Manual)   Pulse: 93   Temp: 98.5 °F (36.9 °C)   TempSrc: Oral   SpO2: 96%   Weight: 104.3 kg (230 lb)   Height: 5' 6" (1.676 m)          Physical Exam  Constitutional:       General: She is not in acute distress.     Appearance: She is obese.   HENT:      Head: Normocephalic and atraumatic.   Eyes:      General: No scleral icterus.  Cardiovascular:      Rate and Rhythm: Normal rate and regular rhythm.   Pulmonary:      Effort: Pulmonary effort is normal. No respiratory distress.      Breath sounds: Normal breath sounds. No wheezing.   Abdominal:      General: There is no distension.      Tenderness: " There is no right CVA tenderness or left CVA tenderness.   Musculoskeletal:      Comments: Left BKA with compression wrap around stump   Neurological:      Mental Status: She is alert.         Assessment and Plan   1. Type 2 diabetes mellitus with diabetic polyneuropathy, with long-term current use of insulin  Labs today for qualtiy of control continue insulin and statin therapy  - CBC Auto Differential; Future  - Comprehensive Metabolic Panel; Future  - Hemoglobin A1C; Future  - pravastatin (PRAVACHOL) 40 MG tablet; Take 1 tablet (40 mg total) by mouth every evening.  Dispense: 90 tablet; Refill: 3    2. Essential hypertension  Just above goal no adjustment today  - Comprehensive Metabolic Panel; Future    3. Bipolar 1 disorder  Management per psychiatry    4. S/P BKA (below knee amputation) unilateral, left  Continue PT with goal of using prosthesis  - methocarbamoL (ROBAXIN) 500 MG Tab; TAKE 1 TABLET(500 MG) BY MOUTH THREE TIMES DAILY as needed for back pain  Dispense: 45 tablet; Refill: 1    5. Need for pneumococcal vaccine  Pcv20 today  - (In Office Administered) Pneumococcal Conjugate Vaccine (20 Valent) (IM) (Preferred)    6. Screening for colon cancer  Fit kit dispensed  - Fecal Immunochemical Test (iFOBT); Future    7. Chronic hand pain, unspecified laterality  Low dose nsai prn check inflammatory markers  - Sedimentation rate; Future  - C-Reactive Protein; Future  - CYCLIC CITRUL PEPTIDE ANTIBODY, IGG; Future    8. Acute vaginitis  Prn fluconazoel  - fluconazole (DIFLUCAN) 150 MG Tab; Take 1 tablet (150 mg total) by mouth once daily. May take second tab po two days after first if symptoms persist  Dispense: 2 tablet; Refill: 0    9. Phantom limb pain   Tramadol prn  - traMADoL (ULTRAM) 50 mg tablet; Take 1 tablet (50 mg total) by mouth every 8 (eight) hours as needed.  Dispense: 30 tablet; Refill: 0    10. History of DVT (deep vein thrombosis)  Continue apixiban

## 2024-01-20 LAB
CCP AB SER IA-ACNC: <0.5 U/ML
ESTIMATED AVG GLUCOSE: 301 MG/DL (ref 68–131)
HBA1C MFR BLD: 12.1 % (ref 4–5.6)

## 2024-01-22 ENCOUNTER — HOSPITAL ENCOUNTER (OUTPATIENT)
Dept: RADIOLOGY | Facility: HOSPITAL | Age: 54
Discharge: HOME | End: 2024-01-22
Attending: INTERNAL MEDICINE
Payer: MEDICAID

## 2024-01-22 ENCOUNTER — OFFICE VISIT (OUTPATIENT)
Dept: OBSTETRICS AND GYNECOLOGY | Facility: CLINIC | Age: 54
End: 2024-01-22
Payer: MEDICAID

## 2024-01-22 ENCOUNTER — CLINICAL SUPPORT (OUTPATIENT)
Dept: REHABILITATION | Facility: HOSPITAL | Age: 54
End: 2024-01-22
Payer: MEDICAID

## 2024-01-22 DIAGNOSIS — Z12.31 BREAST CANCER SCREENING BY MAMMOGRAM: Primary | ICD-10-CM

## 2024-01-22 DIAGNOSIS — Z01.419 WELL WOMAN EXAM: ICD-10-CM

## 2024-01-22 DIAGNOSIS — Z12.31 ENCOUNTER FOR SCREENING MAMMOGRAM FOR MALIGNANT NEOPLASM OF BREAST: ICD-10-CM

## 2024-01-22 DIAGNOSIS — Z74.09 IMPAIRED FUNCTIONAL MOBILITY, BALANCE, GAIT, AND ENDURANCE: Primary | ICD-10-CM

## 2024-01-22 PROCEDURE — 77067 SCR MAMMO BI INCL CAD: CPT | Mod: 26,,, | Performed by: RADIOLOGY

## 2024-01-22 PROCEDURE — 3072F LOW RISK FOR RETINOPATHY: CPT | Mod: CPTII,,,

## 2024-01-22 PROCEDURE — 99396 PREV VISIT EST AGE 40-64: CPT | Mod: S$PBB,,,

## 2024-01-22 PROCEDURE — 77063 BREAST TOMOSYNTHESIS BI: CPT | Mod: 26,,, | Performed by: RADIOLOGY

## 2024-01-22 PROCEDURE — 81514 NFCT DS BV&VAGINITIS DNA ALG: CPT

## 2024-01-22 PROCEDURE — 97110 THERAPEUTIC EXERCISES: CPT | Mod: PN

## 2024-01-22 PROCEDURE — 77067 SCR MAMMO BI INCL CAD: CPT | Mod: TC

## 2024-01-22 PROCEDURE — 3046F HEMOGLOBIN A1C LEVEL >9.0%: CPT | Mod: CPTII,,,

## 2024-01-22 NOTE — PROGRESS NOTES
[facility]   Physical Therapy Treatment Note/ Progress Note    Name: Audrey Natarajan  Clinic Number: [id number]    Therapy Diagnosis:   Encounter Diagnosis   Name Primary?    Impaired functional mobility, balance, gait, and endurance Yes         Physician: Gabe Munoz MD    Visit Date: 1/22/2024    Physician Orders: PT Eval and Treat, Post surgical   Medical Diagnosis from Referral: L97.423 (ICD-10-CM) - Left midfoot ulcer, with necrosis of muscle  Evaluation Date: 7/6/2023  Authorization Period Expiration: 1/19/2024  Plan of Care Expiration: 10/27/2023   Extended from 11/27/2023 to 1/30/2024  Visit # / Visits authorized: 1/7     Precautions: Standard, Diabetes, and Fall    Time In: 2:35  Time Out: 3:15  Total Billable Time: 40 minutes (25 billable)    SUBJECTIVE     Audrey reports mostly no change in rheumatoid arthritis swelling since last session. She does report two different days where the swelling was reduced and she was thus able to don prosthetic device. She reports walking around the house with cane and walker, on those days went fine. She reports using cane or walker for stability, and not feeling confident yet ambulating without AD at home on her own.     She was compliant with home exercise program.  Response to previous treatment: No change   Functional change: None    Pain: 7/10  Location: left knee    OBJECTIVE     Most recent formal re-exam on 11/27/2023    TREATMENT     Audrey received the treatments listed below:       received therapeutic exercises to develop strength and ROM for 25  minutes including:    -retrograde massage of left residual limb- x 10 minutes    -discussion of medical consult, home activities, and current/future physical therapy plan   -cold wrap to lower leg/ residual limb- x 5 minutes     -figure 8 compression wrapping      -5 minutes wrapped    -sit-to-stand with RLE only- x 3    -R single-leg balance:   -multiple ~3-second trials over 2  minutes   - 3 x 8 seconds    *rest breaks provided throughout     PATIENT EDUCATION AND HOME EXERCISES     Home Exercises Provided and Patient Education Provided     Education provided:   - standing HEP  - continuing to wear prosthesis for 1 hour at a time, taking off for one hour    Written Home Exercises Provided: Patient instructed to cont prior HEP. Exercises were reviewed and Audrey was able to demonstrate them prior to the end of the session.  Audrey demonstrated good  understanding of the education provided. See EMR under Patient Instructions for exercises provided during therapy sessions    ASSESSMENT     Ms. Natarajan tolerated modified session well. Swelling to residual limb was evident, and seemed greater than last session. Favorable subject response to retrograde massage. No adverse reaction to figure-8 compression wrapping. Swelling slightly reduced after these interventions, but not significantly enough for prosthetic device to fit. Patient was challenged by right single-leg balance; but she did improve motor performance, including longer hold time, within practice today. Patient remains appropriate for skilled physical therapy.     Audrey Is progressing well towards her goals.   Pt prognosis is Good.     Pt will continue to benefit from skilled outpatient physical therapy to address the deficits listed in the problem list box on initial evaluation, provide pt/family education and to maximize pt's level of independence in the home and community environment.     Pt's spiritual, cultural and educational needs considered and pt agreeable to plan of care and goals.     Anticipated barriers to physical therapy: none    Status as of 11/27/23   Short Term Goals- 4 Weeks  Pt to demonstrate independence in performance of HEP in order to improve daily level of physical activity and improve carry over session to session. Met  Pt to improve B LE strength by > / = 1/2 MMT score in order to improve strength for daily  activities. Met  Pt to score < / = 22 seconds pm TUG in order to decrease fall risk and maximize functional strength for daily activities.Ongoing  Pt to score > / = 19 on Tinetti in order to improve safety and balance during gait.Ongoing  Pt to demo gait for > 200 ft with SPC in order to promote return to independence in home and community. Improving       Long Term Goals - 12 Weeks  Pt to demonstrate compliance with HEP > / = 3x per week in order to improve daily level of physical activity and improve carry over session to session. Met  Pt to improve B LE strength by > / = 1 MMT score in order to improve strength for daily activities. Met  Pt to demonstrate TUG score of < / = 18 seconds in order to decrease fall risk and maximize functional strength for daily activities.Ongoing  Pt to score > / = 24 on Tinetti in order to improve safety and balance during gait.Ongoing  Pt to demo gait for > 200 ft without AD in order to promote return to independence in home and community. Ongoing  Pt to demonstrate ability to balance for > / = 30 seconds on all MCTSIB conditions with no sway in order to improve vestibular response and decrease fall risk.  Ongoing    PLAN     Continue PT 1 to 2 times per week: extended to 1/30/2024   (later date due to delays of prosthetic device adjustments and schedule)     Continue gait training and dynamic balance    Zoe Dumont, PT, DPT  1/22/2024

## 2024-01-22 NOTE — PROGRESS NOTES
Subjective:      Patient ID: Audrey Natarajan is a 53 y.o. female.    Chief Complaint:  No chief complaint on file.      History of Present Illness  HPI  Annual Exam-Postmenopausal  Patient is a 54 yo postmenopausal pt that presents for annual exam. She is in a wheelchair due to right leg amputation. The patient has no complaints today. Reports treated for yeast infection at the beginning of the week and wants to make sure it has cleared up.     The patient is not sexually active- last encounter 4 years ago. GYN screening history: last pap: approximate date 2022 and was normal. The patient is not taking hormone replacement therapy. Patient denies post-menopausal vaginal bleeding. The patient wears seatbelts: yes. The patient participates in regular exercise: yes (does physical therapy). Has the patient ever been transfused or tattooed?: yes. The patient reports that there is not domestic violence in her life.    GYN & OB History  Patient's last menstrual period was 2011 (lmp unknown).   Date of Last Pap: No result found    OB History    Para Term  AB Living   0 0 0 0 0 0   SAB IAB Ectopic Multiple Live Births   0 0 0 0         Review of Systems  Review of Systems   Constitutional:  Negative for activity change and appetite change.   Respiratory:  Negative for shortness of breath.    Gastrointestinal:  Negative for abdominal pain.   Genitourinary: Negative.  Negative for flank pain, hot flashes, pelvic pain, postmenopausal bleeding and vaginal dryness.   Musculoskeletal:  Positive for myalgias.   Integumentary:  Negative for breast mass, nipple discharge, breast skin changes and breast tenderness. Negative.   Neurological: Negative.  Negative for headaches.   Hematological: Negative.    Psychiatric/Behavioral:  Negative for sleep disturbance.    Breast: negative.  Negative for breast self exam, lump, mass, nipple discharge, skin changes and tenderness         Objective:     Physical Exam:    Constitutional: She is oriented to person, place, and time. She appears well-developed.    HENT:   Head: Normocephalic and atraumatic.    Eyes: EOM are normal.     Cardiovascular:  Normal rate.             Pulmonary/Chest: Effort normal.        Abdominal: Soft. There is no abdominal tenderness.                 Neurological: She is oriented to person, place, and time.    Skin: Skin is warm.    Psychiatric: She has a normal mood and affect.         Assessment:     1. Well woman exam              Plan:      1. Well woman exam  - Ambulatory referral/consult to Obstetrics / Gynecology  - Vaginosis Screen by DNA Probe     A thorough history was obtained. I reviewed the patient's health maintenance schedule and informed her that she needs to do her monthly self-breast exams, and to come to the office yearly for her breast and pelvic examinations, and her pap smears will be performed every 3 to 5 years if indicated. She was informed that breast cancer screening with mammogram is recommended every 1 - 2 years until age 50, then annually. General health monitoring and injury prevention were discussed. She was counseled on the need for colo-rectal cancer screening with colonoscopy at the age of 50  and she should contact her primary care physician about this recommendation. She was also counseled about osteoporosis screening at age 65, sexuality, sexually transmitted diseases and reproductive issues in her age group were discussed. The patient was counseled on nutrition and exercise and to make sure she is getting adequate calcium and Vitamin D every day for her bone health as well as to continue weight bearing exercises. She was informed that she will receive any test results from today's visit via the patient portal. She will follow up for annual exam in one year or sooner if gynecological problems develop.    2. Breast cancer screening by mammogram  - Self breast exams encouraged  - MMG ordered by PCP and scheduled for  today.       Follow up next year

## 2024-01-24 ENCOUNTER — PATIENT MESSAGE (OUTPATIENT)
Dept: OBSTETRICS AND GYNECOLOGY | Facility: CLINIC | Age: 54
End: 2024-01-24
Payer: MEDICAID

## 2024-01-24 LAB
BACTERIAL VAGINOSIS DNA: NEGATIVE
CANDIDA GLABRATA DNA: POSITIVE
CANDIDA KRUSEI DNA: NEGATIVE
CANDIDA RRNA VAG QL PROBE: NEGATIVE
T VAGINALIS RRNA GENITAL QL PROBE: NEGATIVE

## 2024-01-29 ENCOUNTER — CLINICAL SUPPORT (OUTPATIENT)
Dept: REHABILITATION | Facility: HOSPITAL | Age: 54
End: 2024-01-29
Payer: MEDICAID

## 2024-01-29 DIAGNOSIS — Z74.09 IMPAIRED FUNCTIONAL MOBILITY, BALANCE, GAIT, AND ENDURANCE: Primary | ICD-10-CM

## 2024-01-29 PROCEDURE — 97110 THERAPEUTIC EXERCISES: CPT | Mod: PN

## 2024-01-29 NOTE — PROGRESS NOTES
[facility]   Physical Therapy Treatment Note    Name: Audrey Natarajan  Clinic Number: [id number]    Therapy Diagnosis:   Encounter Diagnosis   Name Primary?    Impaired functional mobility, balance, gait, and endurance Yes         Physician: Gabe Munoz MD    Visit Date: 1/29/2024    Physician Orders: PT Eval and Treat, Post surgical   Medical Diagnosis from Referral: L97.423 (ICD-10-CM) - Left midfoot ulcer, with necrosis of muscle  Evaluation Date: 7/6/2023  Authorization Period Expiration: 1/19/2024  Plan of Care Expiration: 10/27/2023   Extended from 11/27/2023 to 1/30/2024  Visit # / Visits authorized: 2/7     Precautions: Standard, Diabetes, and Fall    Time In: 2:34  Time Out: 3:15  Total Billable Time: 41 minutes (38 billable)    SUBJECTIVE     Audrey reports that, since last visit last Monday, she was able to don prosthetic device twice. She reports this was consistent with the weather being warmer. She also reports that rheumatologist appointment is in the process of being scheduled.        She was compliant with home exercise program.  Response to previous treatment: No change   Functional change: None    Pain: 7/10  Location: left knee    OBJECTIVE     Most recent formal re-exam on 11/27/2023    TREATMENT     Audrey received the treatments listed below:       received therapeutic exercises to develop strength and ROM for 38  minutes including:    -left quad sets:   -re-training    -x 15    -left SAQ with medium bolster- x 20    -retrograde massage of left residual limb- x 3 minutes    -discussion of medical consult, home activities, and current/future physical therapy plan    -figure 8 compression wrapping      -20 minutes wrapped    -gait with prosthetic device without UE support- 9 ft x 4 rounds    -sit-to-stand with RLE only- x 3    -R single-leg balance:   -multiple ~5-second trials over 1 minute   - 2 10 seconds    *rest breaks provided throughout     PATIENT  EDUCATION AND HOME EXERCISES     Home Exercises Provided and Patient Education Provided     Education provided:   - standing HEP  - continuing to wear prosthesis for 1 hour at a time, taking off for one hour    Written Home Exercises Provided: Patient instructed to cont prior HEP. Exercises were reviewed and Audrey was able to demonstrate them prior to the end of the session.  Audrey demonstrated good  understanding of the education provided. See EMR under Patient Instructions for exercises provided during therapy sessions    ASSESSMENT     Ms. Natarajan tolerated modified session well. Partial response to compression wrapping, allowing donning of prosthetic device; but poor fit, due to continued rheumatoid swelling, caused pain with use of device. Patient responded well to re-training of left quad isolation and strengthening. Due to upcoming consult with rheumatologist (first in several years), and due to current status and limitations, patient will be on hold from OP therapy until swelling managed and she is able to resume skilled treatment here.      Audrey Is progressing well towards her goals.   Pt prognosis is Good.     Pt will continue to benefit from skilled outpatient physical therapy to address the deficits listed in the problem list box on initial evaluation, provide pt/family education and to maximize pt's level of independence in the home and community environment.     Pt's spiritual, cultural and educational needs considered and pt agreeable to plan of care and goals.     Anticipated barriers to physical therapy: none    Status as of 11/27/23   Short Term Goals- 4 Weeks  Pt to demonstrate independence in performance of HEP in order to improve daily level of physical activity and improve carry over session to session. Met  Pt to improve B LE strength by > / = 1/2 MMT score in order to improve strength for daily activities. Met  Pt to score < / = 22 seconds pm TUG in order to decrease fall risk and  maximize functional strength for daily activities.Ongoing  Pt to score > / = 19 on Tinetti in order to improve safety and balance during gait.Ongoing  Pt to demo gait for > 200 ft with SPC in order to promote return to independence in home and community. Improving       Long Term Goals - 12 Weeks  Pt to demonstrate compliance with HEP > / = 3x per week in order to improve daily level of physical activity and improve carry over session to session. Met  Pt to improve B LE strength by > / = 1 MMT score in order to improve strength for daily activities. Met  Pt to demonstrate TUG score of < / = 18 seconds in order to decrease fall risk and maximize functional strength for daily activities.Ongoing  Pt to score > / = 24 on Tinetti in order to improve safety and balance during gait.Ongoing  Pt to demo gait for > 200 ft without AD in order to promote return to independence in home and community. Ongoing  Pt to demonstrate ability to balance for > / = 30 seconds on all MCTSIB conditions with no sway in order to improve vestibular response and decrease fall risk.  Ongoing    PLAN     Continue PT 1 to 2 times per week: extended to 1/30/2024   (later date due to delays of prosthetic device adjustments and schedule)     Continue gait training and dynamic balance    Zoe Dumont, PT, DPT  1/29/2024

## 2024-01-31 DIAGNOSIS — E11.42 TYPE 2 DIABETES MELLITUS WITH DIABETIC POLYNEUROPATHY, WITHOUT LONG-TERM CURRENT USE OF INSULIN: ICD-10-CM

## 2024-01-31 RX ORDER — METFORMIN HYDROCHLORIDE 1000 MG/1
1000 TABLET ORAL 2 TIMES DAILY WITH MEALS
Qty: 180 TABLET | Refills: 1 | Status: SHIPPED | OUTPATIENT
Start: 2024-01-31 | End: 2024-04-19 | Stop reason: SDUPTHER

## 2024-01-31 NOTE — TELEPHONE ENCOUNTER
No care due was identified.  [facility] Embedded Care Due Messages. Reference number: [id number].   1/31/2024 6:24:24 AM CST

## 2024-01-31 NOTE — TELEPHONE ENCOUNTER
Refill Routing Note   Medication(s) are not appropriate for processing by [facility] for the following reason(s):      Drug-disease interaction    [facility] action(s):  Defer Care Due:  None identified     Medication Therapy Plan: metFORMIN and Hepatomegaly; Fatty liver; Liver fibrosis    Pharmacist review requested: Yes     Appointments  past 12m or future 3m with PCP    Date Provider   Last Visit   1/19/2024 Donaldo Pena MD   Next Visit   4/19/2024 Donaldo Pena MD   ED visits in past 90 days: 1        Note composed:3:19 PM 01/31/2024

## 2024-01-31 NOTE — TELEPHONE ENCOUNTER
Refill Decision Note   Audrey Rosa Isela  is requesting a refill authorization.  Brief Assessment and Rationale for Refill:  Approve     Medication Therapy Plan:         Pharmacist review requested: Yes   Extended chart review required: Yes   Comments:     Note composed:3:54 PM 01/31/2024             [Other ___] : a [unfilled] visit for [Family Member] : family member [FreeTextEntry2] : tunneled catheter insertion

## 2024-02-07 ENCOUNTER — TELEPHONE (OUTPATIENT)
Dept: FAMILY MEDICINE | Facility: CLINIC | Age: 54
End: 2024-02-07

## 2024-02-07 NOTE — TELEPHONE ENCOUNTER
----- Message from Margarita Goodman sent at 2/7/2024  9:13 AM CST -----  Type: Patient Call Back    Who called:pt     What is the request in detail:pt requesting to speak to nurse in regards to getting a referral from the doctor for rheumatology. Call pt     Can the clinic reply by [facility]?    Would the patient rather a call back or a response via [facility]? call    Best call back number:[phone] (home)       Additional Information:

## 2024-02-11 NOTE — TELEPHONE ENCOUNTER
No care due was identified.  [facility] Embedded Care Due Messages. Reference number: [id number].   2/11/2024 9:09:34 AM CST

## 2024-02-12 RX ORDER — LIDOCAINE 50 MG/G
PATCH TOPICAL
Qty: 15 PATCH | Refills: 0 | Status: SHIPPED | OUTPATIENT
Start: 2024-02-12 | End: 2024-03-19 | Stop reason: SDUPTHER

## 2024-02-12 NOTE — PLAN OF CARE
[place] - Med Surg  Discharge Final Note  TN sent a secure chat to med surg nurse Rosemary that Patient is cleared for discharge from case management's viewpoint. [facility] home health.  Primary Care Provider: Donaldo Pena MD    Expected Discharge Date: 3/3/2023    Final Discharge Note (most recent)       Final Note - 03/03/23 1517          Final Note    Assessment Type Final Discharge Note     Anticipated Discharge Disposition Home-Health Care [facility]     Hospital Resources/Appts/Education Provided Provided patient/caregiver with written discharge plan information;Appointments scheduled and added to AVS        Post-Acute Status    Post-Acute Authorization Home Health     Home Health Status Set-up Complete/Auth obtained     Coverage healthy blue medicaid     Discharge Delays None known at this time                     Important Message from Medicare na             Contact Info       [facility] [place]    [address] [address]   Phone: [phone]       Next Steps: Follow up    Instructions: please call at time of discharge to schedule follow up appointment and establish care for future needs    [facility]   Specialty: Home Health Services    [address] [address]   Phone: [phone]       Next Steps: Follow up on 3/4/2023    Instructions: Home Health             12-Feb-2024 10:16

## 2024-02-14 DIAGNOSIS — G54.6 PHANTOM LIMB PAIN: ICD-10-CM

## 2024-02-14 RX ORDER — TRAMADOL HYDROCHLORIDE 50 MG/1
50 TABLET ORAL EVERY 8 HOURS PRN
Qty: 30 TABLET | Refills: 0 | Status: SHIPPED | OUTPATIENT
Start: 2024-02-14 | End: 2024-04-19 | Stop reason: SDUPTHER

## 2024-02-14 NOTE — TELEPHONE ENCOUNTER
No care due was identified.  [facility] Embedded Care Due Messages. Reference number: [id number].   2/14/2024 12:11:52 PM CST

## 2024-02-20 ENCOUNTER — TELEPHONE (OUTPATIENT)
Dept: FAMILY MEDICINE | Facility: CLINIC | Age: 54
End: 2024-02-20
Payer: MEDICAID

## 2024-02-20 DIAGNOSIS — M75.51 BURSITIS OF RIGHT SHOULDER: Primary | ICD-10-CM

## 2024-02-20 NOTE — TELEPHONE ENCOUNTER
----- Message from Charlene Connelly sent at 2/20/2024  9:15 AM CST -----  Regarding: self [phone]  Type: Patient Call Back    Who called: self     What is the request in detail: stated she has been waiting to hear about her referral for arthritis.     Can the clinic reply by [facility]? no    Would the patient rather a call back or a response via [facility]? Call back     Best call back number: [phone]

## 2024-02-21 NOTE — TELEPHONE ENCOUNTER
Patient contacted and ID confirmed by name and   Patient with chronic right shoulder stiffness reviewed recent inflammatory markers normal in setting of hyperglycemia suspect more likely bursitis. Referral placed to orthopedics to discuss possible intra-articular injeciton

## 2024-03-09 DIAGNOSIS — Z89.512 S/P BKA (BELOW KNEE AMPUTATION) UNILATERAL, LEFT: ICD-10-CM

## 2024-03-09 NOTE — TELEPHONE ENCOUNTER
Care Due:                  Date            Visit Type   Department     Provider  --------------------------------------------------------------------------------                                [facility]                              PRIMARY      MEDICINE/  Last Visit: 01-      CARE ([facility])   INTERNAL MED   Donaldo Pena                              [facility]                              PRIMARY      MEDICINE/  Next Visit: 04-      CARE ([facility])   INTERNAL MED   Donaldo Pena                                                            Last  Test          Frequency    Reason                     Performed    Due Date  --------------------------------------------------------------------------------    Lipid Panel.  12 months..  pravastatin..............  Not Found    Overdue    [employer] Embedded Care Due Messages. Reference number: [id number].   3/09/2024 3:57:46 PM CST

## 2024-03-11 RX ORDER — METHOCARBAMOL 500 MG/1
TABLET, FILM COATED ORAL
Qty: 45 TABLET | Refills: 1 | Status: SHIPPED | OUTPATIENT
Start: 2024-03-11 | End: 2024-04-19 | Stop reason: SDUPTHER

## 2024-03-15 DIAGNOSIS — I10 ESSENTIAL HYPERTENSION: ICD-10-CM

## 2024-03-15 NOTE — TELEPHONE ENCOUNTER
No care due was identified.  [facility] Embedded Care Due Messages. Reference number: [id number].   3/15/2024 1:55:26 PM CDT

## 2024-03-15 NOTE — TELEPHONE ENCOUNTER
Refill Routing Note   Medication(s) are not appropriate for processing by [facility] for the following reason(s):        Required vitals abnormal    [facility] action(s):  Defer               Appointments  past 12m or future 3m with PCP    Date Provider   Last Visit   1/19/2024 Donaldo Pena MD   Next Visit   4/19/2024 Donaldo Pena MD   ED visits in past 90 days: 1        Note composed:6:11 PM 03/15/2024

## 2024-03-18 RX ORDER — BUMETANIDE 1 MG/1
1 TABLET ORAL DAILY
Qty: 90 TABLET | Refills: 3 | Status: SHIPPED | OUTPATIENT
Start: 2024-03-18

## 2024-03-19 DIAGNOSIS — G54.6 PHANTOM LIMB PAIN: Primary | ICD-10-CM

## 2024-03-19 RX ORDER — LIDOCAINE 50 MG/G
PATCH TOPICAL
Qty: 15 PATCH | Refills: 1 | Status: SHIPPED | OUTPATIENT
Start: 2024-03-19

## 2024-03-19 NOTE — TELEPHONE ENCOUNTER
----- Message from Laury Diaz sent at 3/19/2024  2:03 PM CDT -----  Type: RX Refill Request    Who Called: Self    Have you contacted your pharmacy:No    Refill or New Rx:Refill    RX Name and Strength:LIDOcaine (LIDODERM) 5 %    Preferred Pharmacy with phone number:[id number]  [address] [place]   Phone: [phone]  Fax: [phone]          Local or Mail Order:Local    Would the patient rather a call back or a response via [facility]? Call    Best Call Back Number:[phone] (home)       Additional Information:

## 2024-03-19 NOTE — TELEPHONE ENCOUNTER
No care due was identified.  [facility] Embedded Care Due Messages. Reference number: [id number].   3/19/2024 2:19:48 PM CDT

## 2024-04-09 DIAGNOSIS — Z86.718 HISTORY OF DVT (DEEP VEIN THROMBOSIS): ICD-10-CM

## 2024-04-09 RX ORDER — APIXABAN 5 MG/1
5 TABLET, FILM COATED ORAL 2 TIMES DAILY
Qty: 60 TABLET | Refills: 2 | Status: SHIPPED | OUTPATIENT
Start: 2024-04-09

## 2024-04-19 ENCOUNTER — OFFICE VISIT (OUTPATIENT)
Dept: FAMILY MEDICINE | Facility: CLINIC | Age: 54
End: 2024-04-19
Payer: MEDICAID

## 2024-04-19 VITALS
BODY MASS INDEX: 37.12 KG/M2 | OXYGEN SATURATION: 95 % | HEIGHT: 66 IN | DIASTOLIC BLOOD PRESSURE: 68 MMHG | TEMPERATURE: 98 F | HEART RATE: 93 BPM | SYSTOLIC BLOOD PRESSURE: 114 MMHG

## 2024-04-19 DIAGNOSIS — B35.9 TINEA: ICD-10-CM

## 2024-04-19 DIAGNOSIS — J44.9 CHRONIC OBSTRUCTIVE PULMONARY DISEASE, UNSPECIFIED COPD TYPE: ICD-10-CM

## 2024-04-19 DIAGNOSIS — E11.42 TYPE 2 DIABETES MELLITUS WITH DIABETIC POLYNEUROPATHY, WITHOUT LONG-TERM CURRENT USE OF INSULIN: ICD-10-CM

## 2024-04-19 DIAGNOSIS — Z12.11 SCREENING FOR COLON CANCER: Primary | ICD-10-CM

## 2024-04-19 DIAGNOSIS — Z79.4 TYPE 2 DIABETES MELLITUS WITH DIABETIC POLYNEUROPATHY, WITH LONG-TERM CURRENT USE OF INSULIN: ICD-10-CM

## 2024-04-19 DIAGNOSIS — Z89.512 S/P BKA (BELOW KNEE AMPUTATION) UNILATERAL, LEFT: ICD-10-CM

## 2024-04-19 DIAGNOSIS — G54.6 PHANTOM LIMB PAIN: ICD-10-CM

## 2024-04-19 DIAGNOSIS — E11.42 TYPE 2 DIABETES MELLITUS WITH DIABETIC POLYNEUROPATHY, WITH LONG-TERM CURRENT USE OF INSULIN: ICD-10-CM

## 2024-04-19 PROCEDURE — 99999 PR PBB SHADOW E&M-EST. PATIENT-LVL V: CPT | Mod: PBBFAC,,, | Performed by: INTERNAL MEDICINE

## 2024-04-19 PROCEDURE — 99214 OFFICE O/P EST MOD 30 MIN: CPT | Mod: S$PBB,,, | Performed by: INTERNAL MEDICINE

## 2024-04-19 PROCEDURE — 3072F LOW RISK FOR RETINOPATHY: CPT | Mod: CPTII,,, | Performed by: INTERNAL MEDICINE

## 2024-04-19 PROCEDURE — 3078F DIAST BP <80 MM HG: CPT | Mod: CPTII,,, | Performed by: INTERNAL MEDICINE

## 2024-04-19 PROCEDURE — 1160F RVW MEDS BY RX/DR IN RCRD: CPT | Mod: CPTII,,, | Performed by: INTERNAL MEDICINE

## 2024-04-19 PROCEDURE — 99215 OFFICE O/P EST HI 40 MIN: CPT | Mod: PBBFAC,PN | Performed by: INTERNAL MEDICINE

## 2024-04-19 PROCEDURE — 3046F HEMOGLOBIN A1C LEVEL >9.0%: CPT | Mod: CPTII,,, | Performed by: INTERNAL MEDICINE

## 2024-04-19 PROCEDURE — 3008F BODY MASS INDEX DOCD: CPT | Mod: CPTII,,, | Performed by: INTERNAL MEDICINE

## 2024-04-19 PROCEDURE — 1159F MED LIST DOCD IN RCRD: CPT | Mod: CPTII,,, | Performed by: INTERNAL MEDICINE

## 2024-04-19 PROCEDURE — 4010F ACE/ARB THERAPY RXD/TAKEN: CPT | Mod: CPTII,,, | Performed by: INTERNAL MEDICINE

## 2024-04-19 PROCEDURE — 3074F SYST BP LT 130 MM HG: CPT | Mod: CPTII,,, | Performed by: INTERNAL MEDICINE

## 2024-04-19 RX ORDER — GABAPENTIN 300 MG/1
600 CAPSULE ORAL 3 TIMES DAILY
Qty: 180 CAPSULE | Refills: 2 | Status: SHIPPED | OUTPATIENT
Start: 2024-04-19

## 2024-04-19 RX ORDER — METFORMIN HYDROCHLORIDE 1000 MG/1
1000 TABLET ORAL 2 TIMES DAILY WITH MEALS
Qty: 180 TABLET | Refills: 1 | Status: SHIPPED | OUTPATIENT
Start: 2024-04-19

## 2024-04-19 RX ORDER — TRAMADOL HYDROCHLORIDE 50 MG/1
50 TABLET ORAL EVERY 8 HOURS PRN
Qty: 30 TABLET | Refills: 0 | Status: ON HOLD | OUTPATIENT
Start: 2024-04-19 | End: 2024-05-04

## 2024-04-19 RX ORDER — INSULIN ASPART 100 [IU]/ML
5 INJECTION, SOLUTION INTRAVENOUS; SUBCUTANEOUS 3 TIMES DAILY
Qty: 15 ML | Refills: 1 | Status: ON HOLD | OUTPATIENT
Start: 2024-04-19 | End: 2024-05-04

## 2024-04-19 RX ORDER — FLUTICASONE PROPIONATE AND SALMETEROL 250; 50 UG/1; UG/1
1 POWDER RESPIRATORY (INHALATION) 2 TIMES DAILY
Qty: 60 EACH | Refills: 2 | Status: SHIPPED | OUTPATIENT
Start: 2024-04-19 | End: 2025-04-19

## 2024-04-19 RX ORDER — NYSTATIN 100000 [USP'U]/G
POWDER TOPICAL
Qty: 60 G | Refills: 1 | Status: ON HOLD | OUTPATIENT
Start: 2024-04-19 | End: 2024-05-04

## 2024-04-19 RX ORDER — METHOCARBAMOL 500 MG/1
TABLET, FILM COATED ORAL
Qty: 45 TABLET | Refills: 1 | Status: SHIPPED | OUTPATIENT
Start: 2024-04-19

## 2024-04-19 NOTE — PROGRESS NOTES
"Subjective:       Patient ID: Audrey Natarajan is a 53 y.o. female.    Chief Complaint: No chief complaint on file.    F/u chronic conditions    HPI: 54 y/o w/ HTN IDDM neuropathy s/p left BKA for non healing wounds presents alone for follow up. Using basal and pre meal insulin as prescribed as well as weekly glp1 ra no constipation no abdominal pain. Stil dealing with burning adrienne to right leg. Feels hands more painful last several months not checking blood sugar regularly denies hypoglycemic symptoms      Review of Systems   Constitutional:  Negative for activity change, appetite change, fatigue, fever and unexpected weight change.   HENT:  Negative for ear pain, rhinorrhea and sore throat.    Eyes:  Negative for discharge and visual disturbance.   Respiratory:  Negative for chest tightness, shortness of breath and wheezing.    Cardiovascular:  Negative for chest pain, palpitations and leg swelling.   Gastrointestinal:  Negative for abdominal pain, constipation and diarrhea.   Endocrine: Negative for cold intolerance and heat intolerance.   Genitourinary:  Negative for dysuria and hematuria.   Musculoskeletal:  Positive for arthralgias. Negative for joint swelling and neck stiffness.   Skin:  Negative for rash.   Neurological:  Negative for dizziness, syncope, weakness and headaches.   Psychiatric/Behavioral:  Negative for suicidal ideas.        Objective:     Vitals:    04/19/24 1558   BP: 114/68   BP Location: Left arm   Patient Position: Sitting   BP Method: Large (Manual)   Pulse: 93   Temp: 98.4 °F (36.9 °C)   TempSrc: Oral   SpO2: 95%   Height: 5' 6" (1.676 m)          Physical Exam  Constitutional:       General: She is not in acute distress.     Appearance: She is obese.   HENT:      Head: Normocephalic and atraumatic.   Eyes:      General: No scleral icterus.  Cardiovascular:      Rate and Rhythm: Normal rate and regular rhythm.   Pulmonary:      Effort: Pulmonary effort is normal. No respiratory " distress.      Breath sounds: Normal breath sounds. No wheezing.   Abdominal:      General: There is no distension.      Palpations: Abdomen is soft.      Tenderness: There is no right CVA tenderness or left CVA tenderness.   Neurological:      Mental Status: She is alert.         Assessment and Plan   1. Type 2 diabetes mellitus with diabetic polyneuropathy, with long-term current use of insulin  Increase semaglutide to 2mg weekly labs next week including flp continue moderate intensity statin therapy  - insulin detemir U-100, Levemir, 100 unit/mL (3 mL) SubQ InPn pen; Inject 15 Units into the skin 2 (two) times daily.  Dispense: 15 mL; Refill: 1  - insulin aspart U-100 (NOVOLOG) 100 unit/mL (3 mL) InPn pen; Inject 5 Units into the skin 3 (three) times daily.  Dispense: 15 mL; Refill: 1  - CBC Auto Differential; Future  - Comprehensive Metabolic Panel; Future  - Hemoglobin A1C; Future  - Lipid Panel; Future  - Microalbumin/Creatinine Ratio, Urine; Future  - semaglutide (OZEMPIC) 2 mg/dose (8 mg/3 mL) PnIj; Inject 2 mg into the skin every 7 days.  Dispense: 3 mL; Refill: 11    2. Type 2 diabetes mellitus with diabetic polyneuropathy, without long-term current use of insulin  As above  - metFORMIN (GLUCOPHAGE) 1000 MG tablet; Take 1 tablet (1,000 mg total) by mouth 2 (two) times daily with meals.  Dispense: 180 tablet; Refill: 1    3. Screening for colon cancer  Completed fit kit today (to mail)    4. S/P BKA (below knee amputation) unilateral, left  Prn muscle relaxer  - methocarbamoL (ROBAXIN) 500 MG Tab; TAKE 1 TABLET(500 MG) BY MOUTH THREE TIMES DAILY as needed for back pain  Dispense: 45 tablet; Refill: 1    5. Phantom limb pain  Prn tramadol  - traMADoL (ULTRAM) 50 mg tablet; Take 1 tablet (50 mg total) by mouth every 8 (eight) hours as needed.  Dispense: 30 tablet; Refill: 0  - gabapentin (NEURONTIN) 300 MG capsule; Take 2 capsules (600 mg total) by mouth 3 (three) times daily.  Dispense: 180 capsule; Refill:  2    6. Chronic obstructive pulmonary disease, unspecified COPD type  Continue laba/ics  - fluticasone-salmeterol diskus inhaler 250-50 mcg; Inhale 1 puff into the lungs 2 (two) times daily. Controller  Dispense: 60 each; Refill: 2    7. Tinea  Nystatin powder to abdominal folds keep skin dry  - nystatin (NYSTOP) powder; APPLY TO ABDOMINAL AND BREAST SKIN FOLD TWICE DAILY  Dispense: 60 g; Refill: 1

## 2024-04-22 ENCOUNTER — OFFICE VISIT (OUTPATIENT)
Dept: PODIATRY | Facility: CLINIC | Age: 54
End: 2024-04-22
Payer: MEDICAID

## 2024-04-22 ENCOUNTER — HOSPITAL ENCOUNTER (OUTPATIENT)
Dept: RADIOLOGY | Facility: HOSPITAL | Age: 54
Discharge: HOME | End: 2024-04-22
Attending: PODIATRIST
Payer: MEDICAID

## 2024-04-22 VITALS
HEIGHT: 66 IN | BODY MASS INDEX: 36.95 KG/M2 | DIASTOLIC BLOOD PRESSURE: 60 MMHG | SYSTOLIC BLOOD PRESSURE: 133 MMHG | WEIGHT: 229.94 LBS | HEART RATE: 79 BPM

## 2024-04-22 DIAGNOSIS — M79.671 PAIN IN RIGHT FOOT: Primary | ICD-10-CM

## 2024-04-22 DIAGNOSIS — M79.671 PAIN IN RIGHT FOOT: ICD-10-CM

## 2024-04-22 PROCEDURE — 3072F LOW RISK FOR RETINOPATHY: CPT | Mod: CPTII,,, | Performed by: PODIATRIST

## 2024-04-22 PROCEDURE — 3008F BODY MASS INDEX DOCD: CPT | Mod: CPTII,,, | Performed by: PODIATRIST

## 2024-04-22 PROCEDURE — 1159F MED LIST DOCD IN RCRD: CPT | Mod: CPTII,,, | Performed by: PODIATRIST

## 2024-04-22 PROCEDURE — 3075F SYST BP GE 130 - 139MM HG: CPT | Mod: CPTII,,, | Performed by: PODIATRIST

## 2024-04-22 PROCEDURE — 3046F HEMOGLOBIN A1C LEVEL >9.0%: CPT | Mod: CPTII,,, | Performed by: PODIATRIST

## 2024-04-22 PROCEDURE — 4010F ACE/ARB THERAPY RXD/TAKEN: CPT | Mod: CPTII,,, | Performed by: PODIATRIST

## 2024-04-22 PROCEDURE — 99999 PR PBB SHADOW E&M-EST. PATIENT-LVL V: CPT | Mod: PBBFAC,,, | Performed by: PODIATRIST

## 2024-04-22 PROCEDURE — 99214 OFFICE O/P EST MOD 30 MIN: CPT | Mod: S$PBB,,, | Performed by: PODIATRIST

## 2024-04-22 PROCEDURE — 73630 X-RAY EXAM OF FOOT: CPT | Mod: 26,RT,, | Performed by: INTERNAL MEDICINE

## 2024-04-22 PROCEDURE — 73630 X-RAY EXAM OF FOOT: CPT | Mod: TC,FY,PO,RT

## 2024-04-22 PROCEDURE — 3078F DIAST BP <80 MM HG: CPT | Mod: CPTII,,, | Performed by: PODIATRIST

## 2024-04-22 PROCEDURE — 99215 OFFICE O/P EST HI 40 MIN: CPT | Mod: PBBFAC,PO,25 | Performed by: PODIATRIST

## 2024-04-22 NOTE — PROGRESS NOTES
"[facility] Progress Note    Subjective:       Patient ID: Audrey Natarajan is a 53 y.o. female.    Chief Complaint: Diabetes Mellitus (4/19/24 Dr Pena)    To clinic on scooter which he states inside her trailer at all times. She relates that due to significant left knee pain she has not been able to use knee scooter as intended and has been riding scooter seated. She says that she "pees in a bucket in bedroom as BR is 45 feet away and only go there when I poop". States spends all day in bed. When asked if continues to smoke she said yes and goes outside to smoke. Has almost finished abx from hospital which she was only given for 10 days. Her family and friends do all cooking and shopping for her but wound is bigger by cm length and width with only one tunnel felt at 1100 and has now doubled in size to 4 cm.     9/13/2023 Patient presents to clinic complaining of right foot pain and edema. Following initiation of use of her left leg prosthetic she began to have right midfoot and medial hindfoot pain.  She has been wearing flexible  tennis shoes.  Currently in physical therapy.     12/30/2023 patient returns to clinic for evaluation treatment of high risk foot.  She relates that she was unable to get her compression socks filled due to requested weight.  She relates her rx shoes pending.  She relates she has been able to get up on her prosthetic due to edema.  She continues to have midfoot pain.     4/22/24: Presents for diabetic foot risk assessment. Reports occasional pain right foot.           Review of Systems   Constitutional:  Positive for activity change and fatigue. Negative for appetite change and fever.   Respiratory:  Positive for cough (smoker). Negative for shortness of breath.    Cardiovascular:  Positive for leg swelling. Negative for chest pain.   Gastrointestinal:  Negative for diarrhea, nausea and vomiting.   Musculoskeletal:  Positive for arthralgias and myalgias. " "       Left posterior knee pain and swelling   Skin:  Positive for color change and wound.   Neurological:  Positive for numbness. Negative for weakness.        + paresthesia          Objective:     Vitals:    04/22/24 0939   BP: 133/60   Pulse: 79   Weight: 104.3 kg (229 lb 15 oz)   Height: 5' 6" (1.676 m)            Physical Exam  Nursing note reviewed.   Constitutional:       General: She is not in acute distress.     Appearance: She is not toxic-appearing or diaphoretic.   Cardiovascular:      Pulses:           Dorsalis pedis pulses are 1+ on the right side.        Posterior tibial pulses are 2+ on the right side.   Pulmonary:      Effort: No respiratory distress.   Musculoskeletal:      Right lower leg: Edema present.      Right ankle: Swelling present. No lateral malleolus, medial malleolus, AITF ligament, CF ligament or posterior TF ligament tenderness. Decreased range of motion.      Right Achilles Tendon: No defects. Paniagua's test negative.      Left ankle: No lateral malleolus, medial malleolus, AITF ligament, CF ligament, posterior TF ligament or proximal fibula tenderness.      Right foot: Swelling and tenderness present.      Comments: There is equinus deformity RLE with decreased dorsiflexion at the ankle joint bilateral    Decreased first MPJ range of motion both weightbearing and nonweightbearing, no crepitus observed the first MP joint, + dorsal flag sign (right).     Patient has hammertoes of digits 2-5 RLE partially reducible without symptom today.        Left Lower Extremity: Left leg is amputated below knee.   Skin:     General: Skin is warm and dry.      Coloration: Skin is not pale.      Findings: Erythema present. No laceration.      Nails: There is no clubbing.   Neurological:      Sensory: No sensory deficit.      Motor: No tremor, atrophy or abnormal muscle tone.      Deep Tendon Reflexes: Reflexes are normal and symmetric.      Comments: Paresthesias, and hyperesthesia bilateral feet at " toes with no clearly identified trigger or source.    Caspar-Gilberto 5.07 monofilament is intact bilateral feet. Sharp/dull sensation is also intact Bilateral feet.   Psychiatric:         Attention and Perception: She is attentive.         Mood and Affect: Mood is not anxious. Affect is not inappropriate.         Speech: She is communicative. Speech is not slurred.         Behavior: Behavior is not combative.         EXAMINATION:  XR FOOT COMPLETE 3 VIEW RIGHT     CLINICAL HISTORY:  . Pain in right foot     TECHNIQUE:  Weightbearing AP, lateral, and oblique views of the right foot were performed.     COMPARISON:  05/04/2022.     FINDINGS:  There is a linear lucency involving the base of the distal phalanx of the right great toe with intra-articular extension suggestive of a small nondisplaced fracture.  The remainder of the bones appear intact.  There is healed fracture deformity of the distal aspect of the right 4th metatarsal bone.  There are degenerative changes within the small joints of the foot with no bony erosions evident.  Calcaneal spurs are noted at the insertions of the Achilles tendon and plantar fascia.  No radiopaque soft tissue foreign bodies are evident.     Impression:     Linear lucency involving the base of the distal phalanx of the right great toe suggestive of a nondisplaced fracture.  Correlation with physical findings is recommended.     Healed fracture deformity of the distal right 4th metatarsal bone.     Degenerative changes.     Calcaneal spurs.        Assessment:           ICD-10-CM ICD-9-CM   1. Pain in right foot  M79.671 729.5                    Plan:          Xray right foot ordered   Patient education on the chronic nature of arthralgias of the feet. Discussed non-surgical treatment options, including injection, supportive shoegear, inserts.     Patient instructed on adequate icing techniques. Patient should ice the affected area at least 10 minutes when inflammed. I advised the  patient that extra icing would also be beneficial to ensure adequate anti inflammatory effect. I gave written and verbal instructions on stretching exercises. Patient expressed understanding. Discussed  wearing appropriate shoe gear and avoiding flats, slippers, sandals, and going barefoot. My recommendation for OTC supports is [employer]. Rx diabetic shoes for protection and support       Shoe inspection. Diabetic Foot Education. Patient reminded of the importance of good nutrition/healthy diet/weight management and blood sugar control to help prevent podiatric complications of diabetes. Patient instructed on proper foot hygeine. Wear comfortable, proper fitting shoes. Wash feet daily. Dry well. After drying, apply moisturizer to feet (no lotion to webspaces). Inspect feet daily for skin breaks, blisters, swelling, or redness. Wear cotton socks (preferably white)  Change socks every day. Do NOT walk barefoot. Do NOT use heating pads or hot water soaks. We discussed wearing proper shoe gear, daily foot inspections, never walking without protective shoe gear.     Discussed edema control and the importance of daily moisturizer     She will continue to monitor the areas daily, inspect her feet, wear protective shoe gear when ambulatory, moisturizer to maintain skin integrity and follow in this office in approximately 2-3 months, sooner p.r.n.    Nails 1-5 right foot trimmed B/L     Orders Placed This Encounter   Procedures    X-Ray Foot Complete Right     Standing Status:   Future     Number of Occurrences:   1     Standing Expiration Date:   4/22/2025     Order Specific Question:   Reason for Exam:     Answer:   right foot pain     Order Specific Question:   May the Radiologist modify the order per protocol to meet the clinical needs of the patient?     Answer:   Yes     Order Specific Question:   Release to patient     Answer:   Immediate        No follow-ups on file.

## 2024-04-23 ENCOUNTER — TELEPHONE (OUTPATIENT)
Dept: FAMILY MEDICINE | Facility: CLINIC | Age: 54
End: 2024-04-23
Payer: MEDICAID

## 2024-04-23 ENCOUNTER — PATIENT MESSAGE (OUTPATIENT)
Dept: FAMILY MEDICINE | Facility: CLINIC | Age: 54
End: 2024-04-23
Payer: MEDICAID

## 2024-04-23 DIAGNOSIS — E11.42 TYPE 2 DIABETES MELLITUS WITH DIABETIC POLYNEUROPATHY, WITH LONG-TERM CURRENT USE OF INSULIN: Primary | ICD-10-CM

## 2024-04-23 DIAGNOSIS — Z79.4 TYPE 2 DIABETES MELLITUS WITH DIABETIC POLYNEUROPATHY, WITH LONG-TERM CURRENT USE OF INSULIN: Primary | ICD-10-CM

## 2024-04-23 RX ORDER — ATORVASTATIN CALCIUM 40 MG/1
40 TABLET, FILM COATED ORAL DAILY
Qty: 90 TABLET | Refills: 3 | Status: SHIPPED | OUTPATIENT
Start: 2024-04-23 | End: 2025-04-23

## 2024-04-23 NOTE — TELEPHONE ENCOUNTER
Ldl elevated with elevated TG, stop pravastatin switch to atrovastatin repeat lipid panel at follow up in three months

## 2024-04-23 NOTE — TELEPHONE ENCOUNTER
----- Message from Tennille Monique sent at 4/23/2024  9:55 AM CDT -----  Regarding: Return call  .Type:  Patient Returning Call    Who Called: self     Who Left Message for Patient: Patrick Mirza RN    Does the patient know what this is regarding?:yes     Would the patient rather a call back or a response via [facility]? Call     Best Call Back Number:.[phone]      Additional Information:

## 2024-04-23 NOTE — TELEPHONE ENCOUNTER
Relayed results from last encounter - she verbalized understanding and assisted with setting appt.

## 2024-04-24 ENCOUNTER — TELEPHONE (OUTPATIENT)
Dept: VASCULAR SURGERY | Facility: CLINIC | Age: 54
End: 2024-04-24

## 2024-04-24 ENCOUNTER — OFFICE VISIT (OUTPATIENT)
Dept: VASCULAR SURGERY | Facility: CLINIC | Age: 54
End: 2024-04-24
Payer: MEDICAID

## 2024-04-24 VITALS
SYSTOLIC BLOOD PRESSURE: 115 MMHG | WEIGHT: 230 LBS | HEART RATE: 83 BPM | HEIGHT: 66 IN | BODY MASS INDEX: 36.96 KG/M2 | DIASTOLIC BLOOD PRESSURE: 73 MMHG

## 2024-04-24 DIAGNOSIS — I87.2 VENOUS INSUFFICIENCY: Primary | ICD-10-CM

## 2024-04-24 DIAGNOSIS — Z98.890 STATUS POST ABLATION OF INCOMPETENT VEIN USING LASER: ICD-10-CM

## 2024-04-24 DIAGNOSIS — R60.0 LEG EDEMA, RIGHT: ICD-10-CM

## 2024-04-24 DIAGNOSIS — I89.0 LYMPHEDEMA: ICD-10-CM

## 2024-04-24 PROCEDURE — 3066F NEPHROPATHY DOC TX: CPT | Mod: CPTII,,, | Performed by: SURGERY

## 2024-04-24 PROCEDURE — 3072F LOW RISK FOR RETINOPATHY: CPT | Mod: CPTII,,, | Performed by: SURGERY

## 2024-04-24 PROCEDURE — 4010F ACE/ARB THERAPY RXD/TAKEN: CPT | Mod: CPTII,,, | Performed by: SURGERY

## 2024-04-24 PROCEDURE — 3046F HEMOGLOBIN A1C LEVEL >9.0%: CPT | Mod: CPTII,,, | Performed by: SURGERY

## 2024-04-24 PROCEDURE — 99214 OFFICE O/P EST MOD 30 MIN: CPT | Mod: S$PBB,,, | Performed by: SURGERY

## 2024-04-24 PROCEDURE — 3060F POS MICROALBUMINURIA REV: CPT | Mod: CPTII,,, | Performed by: SURGERY

## 2024-04-24 PROCEDURE — 99212 OFFICE O/P EST SF 10 MIN: CPT | Mod: PBBFAC | Performed by: SURGERY

## 2024-04-24 PROCEDURE — 99999 PR PBB SHADOW E&M-EST. PATIENT-LVL II: CPT | Mod: PBBFAC,,, | Performed by: SURGERY

## 2024-04-24 PROCEDURE — 3078F DIAST BP <80 MM HG: CPT | Mod: CPTII,,, | Performed by: SURGERY

## 2024-04-24 PROCEDURE — 3074F SYST BP LT 130 MM HG: CPT | Mod: CPTII,,, | Performed by: SURGERY

## 2024-04-24 PROCEDURE — 3008F BODY MASS INDEX DOCD: CPT | Mod: CPTII,,, | Performed by: SURGERY

## 2024-04-24 NOTE — PROGRESS NOTES
Tone Mata MD [facility]                         04/24/2024    HPI:  Audrey Natarajan is a 53 y.o. female with   Patient Active Problem List   Diagnosis    Type II diabetes mellitus with neurological manifestations    Essential hypertension    COPD (chronic obstructive pulmonary disease)    Hyperlipidemia    Tobacco abuse    Mild protein malnutrition    Diabetic neuropathy    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    Leg swelling    Incisional hernia without mention of obstruction or gangrene    DM type 2 without retinopathy    History of DVT (deep vein thrombosis)    History of pulmonary embolus (PE)    Bipolar 1 disorder    Gastroparesis due to DM    Cellulitis of left lower extremity    Thrombocytosis    Class 2 severe obesity with serious comorbidity and body mass index (BMI) of 37.0 to 37.9 in adult    Type 2 diabetes mellitus    Other chronic pain    Nuclear sclerosis of both eyes    Bilateral ocular hypertension    Refractive error    Long term (current) use of anticoagulants    History of pulmonary embolism    DVT, recurrent, lower extremity, chronic, left    Decreased ROM of ankle    Decreased strength of lower extremity    Balance problem    Gait abnormality    Fatty liver    Hepatomegaly    History of bariatric surgery    Need for prophylactic vaccination against hepatitis A and hepatitis B    Liver fibrosis    History of diabetic ulcer of foot    Osteomyelitis of left foot    Acute exacerbation of psychosis    Diabetic ulcer of left midfoot associated with type 2 diabetes mellitus, limited to breakdown of skin    Psychosis    SHAWN (obstructive sleep apnea)    Insomnia    Chronic deep vein thrombosis (DVT) of both lower extremities    Diabetic foot ulcer associated with type 2 diabetes mellitus    Lymphadenopathy, inguinal    Hyponatremia    Osteomyelitis of right foot    Iron deficiency anemia    Cellulitis of left foot    PAD  "(peripheral artery disease)    Left midfoot ulcer, with necrosis of muscle    Charcot's joint of foot    Open wound of left foot    Lightheaded    Stump neuralgia    Impaired functional mobility, balance, gait, and endurance    Preseptal cellulitis    Elevated lactic acid level    Abscess of scalp    being managed by PCP and specialists who is here today for evaluation of BLE pain.  9/1/19 presented to ER due to RLE cramping and LLE edema.  S/p LLE DVT 2003, unprovoked and treated with anticoagulation.  S/p PE and L femoral and PT DVT 2013 and had a [place] retrievable filter placed 2013; has had persistent pain and edema since that time.  Repeat US 9/1/19 in ER due to pain/edema showed chronic L PT.  Pt states 2013 after she injured her LLE and had a bleeding vein she had a "vein stripping" to the outside of her LLE.  Patient states location is BLE occurring for 6 yrs.  Associated signs and symptoms include discoloration.  Quality is sharp/throbbing and severity is 10/10 at worst, average 7/10.  Symptoms began 6 yrs ago.  Alleviating factors include elevation.  Worsening factors include dependency.  Denies claudication.      no MI  no Stroke  Tobacco use: 3 cig/day; prev 1 ppd x 35 yrs    12/2019: c/o severe LLE pain.  +compression daily.  C/o stockings being too tight.  States bilateral foot paresthesias.      3/2020:  Cont to c/o LLE pain.  Cannot tolerate compression.      5/2020:  C/o sharp LLE pain that limits her ambulating and sleep despite OTC pain medications.      8/2020:  Cont c/o LLE MSK and neuropathic pain.  +edema.  +compression/elevation > 3 months with continued decreased ability to perform ADLs and ambulation.    12/2020:  Cont to c/o LLE edema, pain despite compression and elevation > 3 mo.  Limiting her ADLs.    6/2021:  Presents with persistent BLE pain, edema and heaviness despite compression, elevation and diet changes > 3 mo limiting her ADLs.    9/2021:  S/p L GSV EVLT 7/30/21.  LLE feels " better.    1/2023:  s/p L foot surgery.  C/o severe LLE and L foot pain.    4/2024:  c/o RLE edema and pain despite compression and elevation > 3 mo.    Past Medical History:   Diagnosis Date    ADHD (attention deficit hyperactivity disorder)     Arthritis     Asthma     Bipolar 1 disorder     Cataract     Cigarette smoker     COPD (chronic obstructive pulmonary disease)     Coronary artery disease     A fib    Depression     bipolar manic depresson    Diabetes mellitus     Diabetic foot ulcers     Diabetic neuropathy     DVT of lower extremity, bilateral 07/2013    bilateral LE DVT. [place] filter placed.     Encounter for blood transfusion     History of blood clots 1. Left Leg=2003; 2.Bilateral Groin=Blood Clots= 5 or 6/ 2013 & 7/2013; 3. LLL of Lung=7/2013;  4. Lt. Lower Leg=7/2013.     Pt. had 1st Blood Clot after [place]=2031, & Last=2013. [place] Filter= Rt.Lateral Neck.    HTN (hypertension) 06/06/2013    Pt states that she does not have hypertension    Hypercholesteremia     Irregular heartbeat     Neuromuscular disorder     neuropathy feet    Obese     PE (pulmonary embolism) 07/2013    bilat LE DVT.     Restless leg syndrome      Past Surgical History:   Procedure Laterality Date    ABDOMINAL SURGERY  2010    gastric sleeve    BELOW KNEE AMPUTATION OF LOWER EXTREMITY Left 4/19/2023    Procedure: AMPUTATION, BELOW KNEE;  Surgeon: Gabe Munoz MD;  Location: [facility] OR;  Service: General;  Laterality: Left;  RN PREOP 4/11/2023    BILATERAL OOPHORECTOMY Bilateral 1/12/2015    CHOLECYSTECTOMY      DEBRIDEMENT OF FOOT Bilateral 5/10/2022    Procedure: DEBRIDEMENT, FOOT;  Surgeon: Maira De Los Santos DPM;  Location: [facility] OR;  Service: Podiatry;  Laterality: Bilateral;    DEBRIDEMENT OF FOOT Left 2/28/2023    Procedure: DEBRIDEMENT, FOOT,biopsy;  Surgeon: Maira De Los Santos DPM;  Location: [facility] OR;  Service: Podiatry;  Laterality: Left;  request misonix, wound VAC, possible neoxx    Green' s filter Right  "2012    Right Neck & Tunneled Down.    HERNIA REPAIR      "[employer] [facility] Repaires around th [place].", pt. states    INCISION AND DRAINAGE FOOT Left 2022    Procedure: INCISION AND DRAINAGE, FOOT;  Surgeon: Fahad Razo DPM;  Location: [facility] OR;  Service: Podiatry;  Laterality: Left;    LAPAROSCOPIC CHOLECYSTECTOMY N/A 9/10/2020    Procedure: CHOLECYSTECTOMY, LAPAROSCOPIC;  Surgeon: Montrell Gutierrez MD;  Location: [facility] OR;  Service: General;  Laterality: N/A;  RN PREOP ----COVID Negative      OVARIAN CYST REMOVAL  3/13/2014    ME REMOVAL OF OVARY/TUBE(S)      SPLENECTOMY, TOTAL  2003    TONSILLECTOMY      as a child    TYMPANOSTOMY TUBE PLACEMENT      VEIN SURGERY      Lt leg     Family History   Problem Relation Name Age of Onset    Hypertension Father      Diabetes Father      Heart disease Father      Cataracts Father      Diabetes Paternal Grandfather      Heart disease Paternal Grandfather      No Known Problems Mother      Ovarian cancer Maternal Grandmother           from this. ? age     No Known Problems Sister      No Known Problems Brother      No Known Problems Maternal Aunt      No Known Problems Maternal Uncle      No Known Problems Paternal Aunt      No Known Problems Paternal Uncle      No Known Problems Maternal Grandfather      Ovarian cancer Paternal Grandmother      Uterine cancer Neg Hx      Breast cancer Neg Hx      Colon cancer Neg Hx      Amblyopia Neg Hx      Blindness Neg Hx      Cancer Neg Hx      Glaucoma Neg Hx      Macular degeneration Neg Hx      Retinal detachment Neg Hx      Strabismus Neg Hx      Stroke Neg Hx      Thyroid disease Neg Hx       Social History     Socioeconomic History    Marital status: Significant Other   Tobacco Use    Smoking status: Former     Current packs/day: 0.00     Average packs/day: 0.5 packs/day for 37.0 years (18.5 ttl pk-yrs)     Types: Cigarettes     Start date: 1983     Quit date: 2020     Years since " quitting: 3.3    Smokeless tobacco: Current    Tobacco comments:     Enrolled in the [place] [employer] on 5/3/14 ([facility] Member ID # [id number]). Ambulatory referral to Smoking Cessation Program   Substance and Sexual Activity    Alcohol use: No     Alcohol/week: 0.0 standard drinks of alcohol    Drug use: No    Sexual activity: Yes     Partners: Male   Social History Narrative     from her  of 20 years    Lives by herself since 2019     Social Determinants of Health     Financial Resource Strain: Medium Risk (3/6/2023)    Overall Financial Resource Strain (CARDIA)     Difficulty of Paying Living Expenses: Somewhat hard   Food Insecurity: Food Insecurity Present (3/6/2023)    Hunger Vital Sign     Worried About Running Out of Food in the Last Year: Often true     Ran Out of Food in the Last Year: Often true   Transportation Needs: Unmet Transportation Needs (3/6/2023)    PRAPARE - Transportation     Lack of Transportation (Medical): Yes     Lack of Transportation (Non-Medical): Yes   Physical Activity: Inactive (3/6/2023)    Exercise Vital Sign     Days of Exercise per Week: 0 days     Minutes of Exercise per Session: 0 min   Stress: Stress Concern Present (3/6/2023)    [employer] [place] [employer] - Occupational Stress Questionnaire     Feeling of Stress : To some extent   Social Connections: Moderately Isolated (3/6/2023)    Social Connection and Isolation Panel [NHANES]     Frequency of Communication with Friends and Family: More than three times a week     Frequency of Social Gatherings with Friends and Family: More than three times a week     Attends Bahai Services: 1 to 4 times per year     Active Member of Clubs or Organizations: No     Attends Club or Organization Meetings: Never     Marital Status: Never    Housing Stability: Low Risk  (3/20/2023)    Housing Stability Vital Sign     Unable to Pay for Housing in the Last Year: No     Number of Places Lived in the Last Year: 1      Unstable Housing in the Last Year: No       Current Outpatient Medications:     albuterol (PROVENTIL/VENTOLIN HFA) 90 mcg/actuation inhaler, INHALE 2 PUFFS INTO THE LUNGS EVERY 6 HOURS AS NEEDED FOR WHEEZING. RESCUE, Disp: 6.7 g, Rfl: 2    albuterol-ipratropium (DUO-NEB) 2.5 mg-0.5 mg/3 mL nebulizer solution, Take 3 mLs by nebulization every 6 (six) hours as needed for Wheezing or Shortness of Breath. Rescue, Disp: 60 mL, Rfl: 0    ammonium lactate (LAC-HYDRIN) 12 % lotion, APPL Y ONCE TOPICALLY TWICE DAILY FOR 30 DAYS, Disp: 225 g, Rfl: 0    aspirin 81 MG Chew, Take 1 tablet (81 mg total) by mouth once daily., Disp: 30 tablet, Rfl: 0    atorvastatin (LIPITOR) 40 MG tablet, Take 1 tablet (40 mg total) by mouth once daily., Disp: 90 tablet, Rfl: 3    BIOFREEZE, MENTHOL, TOP, Apply topically., Disp: , Rfl:     blood sugar diagnostic Strp, To check BG three times daily, to use with insurance preferred meter, Disp: 300 each, Rfl: 3    blood-glucose meter (TRUE METRIX GLUCOSE METER) Misc, USE TO TEST BLOOD GLUCOSE THREE TIMES DAILY AS DIRECTED, Disp: 1 each, Rfl: 0    bumetanide (BUMEX) 1 MG tablet, Take 1 tablet (1 mg total) by mouth once daily., Disp: 90 tablet, Rfl: 3    cyanocobalamin (VITAMIN B-12) 1000 MCG tablet, Take 100 mcg by mouth once daily., Disp: , Rfl:     diclofenac sodium (VOLTAREN) 1 % Gel, Apply 2 g topically 2 (two) times daily., Disp: 100 g, Rfl: 3    divalproex (DEPAKOTE) 500 MG TbEC, Take 1 tablet (500 mg total) by mouth every evening., Disp: 30 tablet, Rfl: 11    DUPIXENT  mg/2 mL PnIj, Inject into the skin every 14 (fourteen) days., Disp: , Rfl:     ELIQUIS 5 mg Tab, TAKE 1 TABLET(5 MG) BY MOUTH TWICE DAILY, Disp: 60 tablet, Rfl: 2    ferrous sulfate 325 (65 FE) MG EC tablet, Take 1 tablet (325 mg total) by mouth once daily., Disp: 30 tablet, Rfl: 0    fluconazole (DIFLUCAN) 150 MG Tab, Take 1 tablet (150 mg total) by mouth once daily. May take second tab po two days after first if  symptoms persist, Disp: 2 tablet, Rfl: 0    fluticasone propionate (FLONASE) 50 mcg/actuation nasal spray, 2 sprays (100 mcg total) by Each Nostril route once daily., Disp: 48 mL, Rfl: 0    fluticasone-salmeterol diskus inhaler 250-50 mcg, Inhale 1 puff into the lungs 2 (two) times daily. Controller, Disp: 60 each, Rfl: 2    gabapentin (NEURONTIN) 300 MG capsule, Take 2 capsules (600 mg total) by mouth 3 (three) times daily., Disp: 180 capsule, Rfl: 2    hydrOXYzine HCL (ATARAX) 25 MG tablet, Take 1 tablet (25 mg total) by mouth every 6 (six) hours as needed for itching or anxiety., Disp: 20 tablet, Rfl: 0    insulin aspart U-100 (NOVOLOG) 100 unit/mL (3 mL) InPn pen, Inject 5 Units into the skin 3 (three) times daily., Disp: 15 mL, Rfl: 1    insulin detemir U-100, Levemir, 100 unit/mL (3 mL) SubQ InPn pen, Inject 15 Units into the skin 2 (two) times daily., Disp: 15 mL, Rfl: 1    lancets Misc, To check BG three times daily, to use with insurance preferred meter, Disp: 300 each, Rfl: 3    LIDOcaine (LIDODERM) 5 %, UNWRAP AND APPLY 1 PATCH TO SKIN ONCE DAILY. REMOVE AFTER 12 HOURS, Disp: 15 patch, Rfl: 1    lisinopriL 10 MG tablet, Take 1 tablet (10 mg total) by mouth once daily., Disp: 90 tablet, Rfl: 0    loratadine (CLARITIN) 10 mg tablet, TAKE 1 TABLET BY MOUTH DAILY, Disp: 90 tablet, Rfl: 3    meloxicam (MOBIC) 7.5 MG tablet, Take 1 tablet (7.5 mg total) by mouth once daily., Disp: 30 tablet, Rfl: 1    metFORMIN (GLUCOPHAGE) 1000 MG tablet, Take 1 tablet (1,000 mg total) by mouth 2 (two) times daily with meals., Disp: 180 tablet, Rfl: 1    methocarbamoL (ROBAXIN) 500 MG Tab, TAKE 1 TABLET(500 MG) BY MOUTH THREE TIMES DAILY as needed for back pain, Disp: 45 tablet, Rfl: 1    metoprolol tartrate (LOPRESSOR) 25 MG tablet, Take 1 tablet (25 mg total) by mouth 2 (two) times daily., Disp: 180 tablet, Rfl: 3    multivitamin Tab, Take 1 tablet by mouth once daily., Disp: 30 tablet, Rfl: 2    nystatin (NYSTOP) powder,  "APPLY TO ABDOMINAL AND BREAST SKIN FOLD TWICE DAILY, Disp: 60 g, Rfl: 1    pantoprazole (PROTONIX) 40 MG tablet, TAKE 1 TABLET(40 MG) BY MOUTH EVERY DAY, Disp: 90 tablet, Rfl: 3    pen needle, diabetic 32 gauge x 5/32" Ndle, USE TO INJECT INSULIN FOUR TIMES DAILY AS DIRECTED, Disp: 100 each, Rfl: 1    risperiDONE (RISPERDAL) 3 MG Tab, Take 1 tablet (3 mg total) by mouth in the morning and 1 tablet (3 mg total) in the evening. Indications: Mood., Disp: 60 tablet, Rfl: 0    semaglutide (OZEMPIC) 2 mg/dose (8 mg/3 mL) PnIj, Inject 2 mg into the skin every 7 days., Disp: 3 mL, Rfl: 11    traMADoL (ULTRAM) 50 mg tablet, Take 1 tablet (50 mg total) by mouth every 8 (eight) hours as needed., Disp: 30 tablet, Rfl: 0    vitamin E 1000 UNIT capsule, Take 1,000 Units by mouth once daily., Disp: , Rfl:     VYVANSE 40 mg Cap, Take 40 mg by mouth once daily., Disp: , Rfl:     REVIEW OF SYSTEMS:  General: No fevers or chills; ENT: No sore throat; Allergy and Immunology: no persistent infections; Hematological and Lymphatic: No history of bleeding or easy bruising; Endocrine: negative; Respiratory: no cough, shortness of breath, or wheezing; Cardiovascular: no chest pain or dyspnea on exertion; Gastrointestinal: no abdominal pain/back, change in bowel habits, or bloody stools; Genito-Urinary: no dysuria, trouble voiding, or hematuria; Musculoskeletal: negative; Neurological: no TIA or stroke symptoms; Psychiatric: no nervousness, anxiety or depression.    PHYSICAL EXAM:      Pulse: 83         General appearance:  Alert, well-appearing, and in no distress.  Oriented to person, place, and time                    Neurological: Normal speech, no focal findings noted; CN II - XII grossly intact. RLE with sensation to light touch           Musculoskeletal: Digits/nail without cyanosis/clubbing.  Strength 5/5 BLE.                    Neck: Supple, no significant adenopathy, no carotid bruit can be auscultated                  Chest:  No " "use of accessory muscles               Cardiac: Normal rate             Abdomen: Soft      Extremities:        2+ R DP pulse     1+ RLE edema    Skin: RLE without wounds; L BKA    LAB RESULTS:  No results found for: "CBC"  Lab Results   Component Value Date    LABPROT 10.2 10/07/2023    INR 1.0 10/07/2023     Lab Results   Component Value Date     04/22/2024    K 4.3 04/22/2024    CL 94 (L) 04/22/2024    CO2 29 04/22/2024     (H) 04/22/2024    BUN 10 04/22/2024    CREATININE 0.7 04/22/2024    CALCIUM 9.8 04/22/2024    ANIONGAP 15 04/22/2024    EGFRNONAA >60 05/12/2022     Lab Results   Component Value Date    WBC 12.57 04/22/2024    RBC 4.84 04/22/2024    HGB 13.6 04/22/2024    HCT 43.4 04/22/2024    MCV 90 04/22/2024    MCH 28.1 04/22/2024    MCHC 31.3 (L) 04/22/2024    RDW 15.3 (H) 04/22/2024     04/22/2024    MPV 11.3 04/22/2024    GRAN 3.9 04/22/2024    GRAN 31.2 (L) 04/22/2024    LYMPH 6.4 (H) 04/22/2024    LYMPH 51.2 (H) 04/22/2024    MONO 1.3 (H) 04/22/2024    MONO 10.3 04/22/2024    EOS 0.7 (H) 04/22/2024    BASO 0.16 04/22/2024    EOSINOPHIL 5.2 04/22/2024    BASOPHIL 1.3 04/22/2024    DIFFMETHOD Automated 04/22/2024     .  Lab Results   Component Value Date    HGBA1C 11.0 (H) 04/22/2024       IMAGING:  All pertinent imaging has been reviewed and interpreted independently.    Venous US 9/2019: Left 1 of 2 PT vein with thrombus present, similar to previous US     9/16/19 JEYSON:  1. Right lower extremity pressures and waveforms indicate no hemodynamically significant arterial occlusive disease.  2. Left lower extremity pressures and waveforms indicate no hemodynamically significant arterial occlusive disease.     Venous reflux 12/12/19: RLE with GSV reflux at distal thigh to calf.  No DVT.    DVT LLE US 12/16/19: No LLE DVT    LLE venous US 3/2020:  Left lower extremity venous ultrasound shows greater saphenous vein reflux at the distal thigh.  There is popliteal vein reflux.  There is no " lesser saphenous vein reflux.  There is no anterior accessory saphenous venous reflux.  There is no DVT.    11/2020:  1. Right lower extremity venous ultrasound shows greater saphenous vein reflux at the knee and calf.  There is no lesser saphenous vein reflux.  There is no anterior accessory saphenous venous reflux.  There is no DVT.  2. Left lower extremity venous ultrasound shows greater saphenous vein reflux at the saphenofemoral junction and the distal thigh.  There is popliteal vein reflux.  There is no lesser saphenous vein reflux.  There is no anterior accessory saphenous venous reflux.  There is no DVT.    IMP/PLAN:  53 y.o. female with   Patient Active Problem List   Diagnosis    Type II diabetes mellitus with neurological manifestations    Essential hypertension    COPD (chronic obstructive pulmonary disease)    Hyperlipidemia    Tobacco abuse    Mild protein malnutrition    Diabetic neuropathy    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    Leg swelling    Incisional hernia without mention of obstruction or gangrene    DM type 2 without retinopathy    History of DVT (deep vein thrombosis)    History of pulmonary embolus (PE)    Bipolar 1 disorder    Gastroparesis due to DM    Cellulitis of left lower extremity    Thrombocytosis    Class 2 severe obesity with serious comorbidity and body mass index (BMI) of 37.0 to 37.9 in adult    Type 2 diabetes mellitus    Other chronic pain    Nuclear sclerosis of both eyes    Bilateral ocular hypertension    Refractive error    Long term (current) use of anticoagulants    History of pulmonary embolism    DVT, recurrent, lower extremity, chronic, left    Decreased ROM of ankle    Decreased strength of lower extremity    Balance problem    Gait abnormality    Fatty liver    Hepatomegaly    History of bariatric surgery    Need for prophylactic vaccination against hepatitis A and hepatitis B    Liver fibrosis    History of diabetic ulcer of foot     Osteomyelitis of left foot    Acute exacerbation of psychosis    Diabetic ulcer of left midfoot associated with type 2 diabetes mellitus, limited to breakdown of skin    Psychosis    SHAWN (obstructive sleep apnea)    Insomnia    Chronic deep vein thrombosis (DVT) of both lower extremities    Diabetic foot ulcer associated with type 2 diabetes mellitus    Lymphadenopathy, inguinal    Hyponatremia    Osteomyelitis of right foot    Iron deficiency anemia    Cellulitis of left foot    PAD (peripheral artery disease)    Left midfoot ulcer, with necrosis of muscle    Charcot's joint of foot    Open wound of left foot    Lightheaded    Stump neuralgia    Impaired functional mobility, balance, gait, and endurance    Preseptal cellulitis    Elevated lactic acid level    Abscess of scalp    being managed by PCP and specialists who is here today for evaluation of BLE pain and edema.    -LLE pain and edema likely due to post thrombotic syndrome, no DVT on repeat venous duplex US with L distal thigh GSV reflux -recommend compression with Rx stockings, elevation, dietary changes associated with water and sodium intake discussed at length with patient   -R GSV distal reflux symptomatic although not lifestyle limiting - recommend compression with Rx stockings, elevation, dietary changes associated with water and sodium intake discussed at length with patient  -Rec cont exercise  -Rec optimization of neuropathic and MSK pain with NSGY, Pain mgmt and Rheumatology - cont Neuro and NSGY recs  -Cont Hematology for comprehensive mgmt of thrombotic events  -Lymphedema clinic and pumps - Patient has tried and failed compression of 20-30 mm Hg, elevation and exercise for >1 month period.  Basic pump trial performed and discontinued due to sensitivity and pain to static pressure.  Symptoms of swelling, pain and hyperplasia present   -s/p L GSV EVLT recovering well with ablated vein on US   -rec RLE venous insuff US - RTC 2-3 weeks    I spent  12 minutes evaluating this patient and greater than 50% of the time was spent counseling, coordinator care and discussing the plan of care.  All questions were answered and patient stated understanding with agreement with the above treatment plan.    Tone Mata MD [facility]  Vascular and Endovascular Surgery

## 2024-04-30 ENCOUNTER — HOSPITAL ENCOUNTER (INPATIENT)
Facility: HOSPITAL | Age: 54
LOS: 4 days | Discharge: HOME | DRG: 603 | End: 2024-05-04
Attending: EMERGENCY MEDICINE | Admitting: STUDENT IN AN ORGANIZED HEALTH CARE EDUCATION/TRAINING PROGRAM
Payer: MEDICAID

## 2024-04-30 DIAGNOSIS — B96.7 CLOSTRIDIUM PERFRINGENS INFECTION: ICD-10-CM

## 2024-04-30 DIAGNOSIS — R51.9 NONINTRACTABLE HEADACHE, UNSPECIFIED CHRONICITY PATTERN, UNSPECIFIED HEADACHE TYPE: ICD-10-CM

## 2024-04-30 DIAGNOSIS — L03.90 CELLULITIS: ICD-10-CM

## 2024-04-30 DIAGNOSIS — E11.42 TYPE 2 DIABETES MELLITUS WITH DIABETIC POLYNEUROPATHY, WITH LONG-TERM CURRENT USE OF INSULIN: ICD-10-CM

## 2024-04-30 DIAGNOSIS — L03.211 FACIAL CELLULITIS: ICD-10-CM

## 2024-04-30 DIAGNOSIS — L03.213 PRESEPTAL CELLULITIS: ICD-10-CM

## 2024-04-30 DIAGNOSIS — R07.9 CHEST PAIN: ICD-10-CM

## 2024-04-30 DIAGNOSIS — E11.65 HYPERGLYCEMIA DUE TO DIABETES MELLITUS: ICD-10-CM

## 2024-04-30 DIAGNOSIS — L03.811 CELLULITIS OF HEAD EXCEPT FACE: ICD-10-CM

## 2024-04-30 DIAGNOSIS — R05.9 COUGH: ICD-10-CM

## 2024-04-30 DIAGNOSIS — I10 ESSENTIAL HYPERTENSION: ICD-10-CM

## 2024-04-30 DIAGNOSIS — N76.0 ACUTE VAGINITIS: ICD-10-CM

## 2024-04-30 DIAGNOSIS — G54.6 PHANTOM LIMB PAIN: ICD-10-CM

## 2024-04-30 DIAGNOSIS — R78.81 BACTEREMIA: Primary | ICD-10-CM

## 2024-04-30 DIAGNOSIS — Z79.4 TYPE 2 DIABETES MELLITUS WITH DIABETIC POLYNEUROPATHY, WITH LONG-TERM CURRENT USE OF INSULIN: ICD-10-CM

## 2024-04-30 DIAGNOSIS — B35.9 TINEA: ICD-10-CM

## 2024-04-30 DIAGNOSIS — J42 CHRONIC BRONCHITIS, UNSPECIFIED CHRONIC BRONCHITIS TYPE: Chronic | ICD-10-CM

## 2024-04-30 LAB
ALBUMIN SERPL-MCNC: 3.7 G/DL (ref 3.3–5.5)
ALLENS TEST: ABNORMAL
ALP SERPL-CCNC: 77 U/L (ref 42–141)
BILIRUB SERPL-MCNC: 0.6 MG/DL (ref 0.2–1.6)
BILIRUBIN, POC UA: NEGATIVE
BLOOD, POC UA: ABNORMAL
BUN SERPL-MCNC: 17 MG/DL (ref 7–22)
CALCIUM SERPL-MCNC: 10.4 MG/DL (ref 8–10.3)
CHLORIDE SERPL-SCNC: 95 MMOL/L (ref 98–108)
CLARITY, POC UA: CLEAR
COLOR, POC UA: YELLOW
CREAT SERPL-MCNC: 0.8 MG/DL (ref 0.6–1.2)
CTP QC/QA: YES
GLUCOSE SERPL-MCNC: 418 MG/DL (ref 73–118)
GLUCOSE, POC UA: ABNORMAL
HCO3 UR-SCNC: 27.3 MMOL/L (ref 24–28)
HCT, POC: NORMAL
HGB, POC: NORMAL (ref 14–18)
INFLUENZA A ANTIGEN, POC: NEGATIVE
INFLUENZA B ANTIGEN, POC: NEGATIVE
KETONES, POC UA: ABNORMAL
LDH SERPL L TO P-CCNC: 3.01 MMOL/L (ref 0.36–1.25)
LEUKOCYTE EST, POC UA: NEGATIVE
MCH, POC: NORMAL
MCHC, POC: NORMAL
MCV, POC: NORMAL
MPV, POC: NORMAL
NITRITE, POC UA: NEGATIVE
PCO2 BLDA: 40.7 MMHG (ref 35–45)
PH SMN: 7.43 [PH] (ref 7.35–7.45)
PH UR STRIP: 5.5 [PH]
PO2 BLDA: 68 MMHG (ref 80–100)
POC ALT (SGPT): 26 U/L (ref 10–47)
POC AST (SGOT): 23 U/L (ref 11–38)
POC BE: 3 MMOL/L
POC PLATELET COUNT: NORMAL
POC SATURATED O2: 94 % (ref 95–100)
POC TCO2: 29 MMOL/L (ref 23–27)
POC TCO2: 31 MMOL/L (ref 18–33)
POCT GLUCOSE: 252 MG/DL (ref 70–110)
POCT GLUCOSE: 349 MG/DL (ref 70–110)
POCT GLUCOSE: 381 MG/DL (ref 70–110)
POTASSIUM BLD-SCNC: 5.1 MMOL/L (ref 3.6–5.1)
PROTEIN, POC UA: NEGATIVE
PROTEIN, POC: 7.1 G/DL (ref 6.4–8.1)
RBC, POC: NORMAL
RDW, POC: NORMAL
SAMPLE: ABNORMAL
SARS-COV-2 RDRP RESP QL NAA+PROBE: NEGATIVE
SITE: ABNORMAL
SODIUM BLD-SCNC: 139 MMOL/L (ref 128–145)
SPECIFIC GRAVITY, POC UA: 1.01
UROBILINOGEN, POC UA: 0.2 E.U./DL
WBC, POC: NORMAL

## 2024-04-30 PROCEDURE — 63600175 PHARM REV CODE 636 W HCPCS: Mod: ER | Performed by: EMERGENCY MEDICINE

## 2024-04-30 PROCEDURE — 96374 THER/PROPH/DIAG INJ IV PUSH: CPT | Mod: ER

## 2024-04-30 PROCEDURE — 96375 TX/PRO/DX INJ NEW DRUG ADDON: CPT | Mod: ER

## 2024-04-30 PROCEDURE — 99285 EMERGENCY DEPT VISIT HI MDM: CPT | Mod: 25,ER

## 2024-04-30 PROCEDURE — 96361 HYDRATE IV INFUSION ADD-ON: CPT | Mod: ER

## 2024-04-30 PROCEDURE — 25000003 PHARM REV CODE 250: Mod: ER | Performed by: STUDENT IN AN ORGANIZED HEALTH CARE EDUCATION/TRAINING PROGRAM

## 2024-04-30 PROCEDURE — 87154 CUL TYP ID BLD PTHGN 6+ TRGT: CPT | Performed by: STUDENT IN AN ORGANIZED HEALTH CARE EDUCATION/TRAINING PROGRAM

## 2024-04-30 PROCEDURE — 87635 SARS-COV-2 COVID-19 AMP PRB: CPT | Mod: ER | Performed by: EMERGENCY MEDICINE

## 2024-04-30 PROCEDURE — 85025 COMPLETE CBC W/AUTO DIFF WBC: CPT | Mod: ER

## 2024-04-30 PROCEDURE — 83605 ASSAY OF LACTIC ACID: CPT | Mod: ER

## 2024-04-30 PROCEDURE — 87076 CULTURE ANAEROBE IDENT EACH: CPT | Performed by: STUDENT IN AN ORGANIZED HEALTH CARE EDUCATION/TRAINING PROGRAM

## 2024-04-30 PROCEDURE — 87040 BLOOD CULTURE FOR BACTERIA: CPT | Performed by: STUDENT IN AN ORGANIZED HEALTH CARE EDUCATION/TRAINING PROGRAM

## 2024-04-30 PROCEDURE — 63600175 PHARM REV CODE 636 W HCPCS: Mod: ER | Performed by: STUDENT IN AN ORGANIZED HEALTH CARE EDUCATION/TRAINING PROGRAM

## 2024-04-30 PROCEDURE — 82803 BLOOD GASES ANY COMBINATION: CPT | Mod: ER

## 2024-04-30 PROCEDURE — 25000003 PHARM REV CODE 250: Mod: ER | Performed by: EMERGENCY MEDICINE

## 2024-04-30 PROCEDURE — 11000001 HC ACUTE MED/SURG PRIVATE ROOM

## 2024-04-30 PROCEDURE — 87804 INFLUENZA ASSAY W/OPTIC: CPT | Mod: ER

## 2024-04-30 PROCEDURE — 99900035 HC TECH TIME PER 15 MIN (STAT): Mod: ER

## 2024-04-30 PROCEDURE — 80053 COMPREHEN METABOLIC PANEL: CPT | Mod: ER

## 2024-04-30 PROCEDURE — 36600 WITHDRAWAL OF ARTERIAL BLOOD: CPT | Mod: ER

## 2024-04-30 PROCEDURE — 82962 GLUCOSE BLOOD TEST: CPT | Mod: ER

## 2024-04-30 RX ORDER — ONDANSETRON HYDROCHLORIDE 2 MG/ML
4 INJECTION, SOLUTION INTRAVENOUS
Status: COMPLETED | OUTPATIENT
Start: 2024-04-30 | End: 2024-04-30

## 2024-04-30 RX ORDER — KETOROLAC TROMETHAMINE 30 MG/ML
15 INJECTION, SOLUTION INTRAMUSCULAR; INTRAVENOUS
Status: COMPLETED | OUTPATIENT
Start: 2024-04-30 | End: 2024-04-30

## 2024-04-30 RX ORDER — ACETAMINOPHEN 500 MG
1000 TABLET ORAL
Status: COMPLETED | OUTPATIENT
Start: 2024-04-30 | End: 2024-04-30

## 2024-04-30 RX ADMIN — KETOROLAC TROMETHAMINE 15 MG: 30 INJECTION, SOLUTION INTRAMUSCULAR at 05:04

## 2024-04-30 RX ADMIN — KETOROLAC TROMETHAMINE 15 MG: 30 INJECTION, SOLUTION INTRAMUSCULAR at 10:04

## 2024-04-30 RX ADMIN — ONDANSETRON 4 MG: 2 INJECTION INTRAMUSCULAR; INTRAVENOUS at 09:04

## 2024-04-30 RX ADMIN — SODIUM CHLORIDE, POTASSIUM CHLORIDE, SODIUM LACTATE AND CALCIUM CHLORIDE 1000 ML: 600; 310; 30; 20 INJECTION, SOLUTION INTRAVENOUS at 08:04

## 2024-04-30 RX ADMIN — VANCOMYCIN HYDROCHLORIDE 2000 MG: 1 INJECTION, POWDER, LYOPHILIZED, FOR SOLUTION INTRAVENOUS at 10:04

## 2024-04-30 RX ADMIN — INSULIN HUMAN 6 UNITS: 100 INJECTION, SOLUTION PARENTERAL at 09:04

## 2024-04-30 RX ADMIN — ACETAMINOPHEN 1000 MG: 500 TABLET ORAL at 09:04

## 2024-04-30 RX ADMIN — SODIUM CHLORIDE 1000 ML: 9 INJECTION, SOLUTION INTRAVENOUS at 05:04

## 2024-04-30 RX ADMIN — ONDANSETRON 4 MG: 2 INJECTION INTRAMUSCULAR; INTRAVENOUS at 05:04

## 2024-04-30 NOTE — ED PROVIDER NOTES
Encounter Date: 4/30/2024    SCRIBE #1 NOTE: I, Isiah Olivares Do, am scribing for, and in the presence of,  Jocelyn Padilla MD. I have scribed the following portions of the note - Other sections scribed: HPI, ROS, PE.       History     Chief Complaint   Patient presents with    Headache     Pt got her insect bites cauterized yesterday by derm and began with MARIANO.      Audrey Natarajan is a 53 y.o. female, with a PMHx of HTN, HLD, DVT, PE, T2DM, Bipolar I disorder, and thrombocytosis, who presents to the ED with acute headache with associated nausea and cervical lymphadenopathy that began today. Patient reports a dermatology appointment yesterday to apply liquid nitrogen to heal insect bites. She is unsure if this is related to her head pain. She states it feels like the last time she had scalp cellulitis and had to be admitted.  Patient notes compliance with atorvastatin, bumetanide, Depakote, eliquis, gabapentin, lisinopril, metformin, metoprolol, and pantoprazole. No other exacerbating or alleviating factors. Patient denies any associated fever, chills, chest pain.    The history is provided by the patient. No  was used.     Review of patient's allergies indicates:   Allergen Reactions    Morphine Other (See Comments)     Patient had a psychotic episode after taking Morphine  Agitation, hallucinations  Other Reaction(s): Other (See Comments), Other (See Comments)    Patient had a psychotic episode after taking Morphine    Patient had a psychotic episode after taking Morphine Agitation, hallucinations    Penicillins Anaphylaxis     Tolerated cephalosporins in the past    Januvia [sitagliptin] Hives    Carbamazepine Other (See Comments)     hyponatremia  Other Reaction(s): Other (See Comments)    hyponatremia     Past Medical History:   Diagnosis Date    ADHD (attention deficit hyperactivity disorder)     Arthritis     Asthma     Bipolar 1 disorder     Cataract     Cigarette smoker     COPD (chronic  "obstructive pulmonary disease)     Coronary artery disease     A fib    Depression     bipolar manic depresson    Diabetes mellitus     Diabetic foot ulcers     Diabetic neuropathy     DVT of lower extremity, bilateral 07/2013    bilateral LE DVT. Estelita filter placed.     Encounter for blood transfusion     History of blood clots 1. Left Leg=2003; 2.Bilateral Groin=Blood Clots= 5 or 6/ 2013 & 7/2013; 3. LLL of Lung=7/2013;  4. Lt. Lower Leg=7/2013.     Pt. had 1st Blood Clot after Yagwlnnvrrnd=4227, & Last=2013. Estelita Filter= Rt.Lateral Neck.    HTN (hypertension) 06/06/2013    Pt states that she does not have hypertension    Hypercholesteremia     Irregular heartbeat     Neuromuscular disorder     neuropathy feet    Obese     PE (pulmonary embolism) 07/2013    bilat LE DVT.     Restless leg syndrome      Past Surgical History:   Procedure Laterality Date    ABDOMINAL SURGERY  2010    gastric sleeve    BELOW KNEE AMPUTATION OF LOWER EXTREMITY Left 4/19/2023    Procedure: AMPUTATION, BELOW KNEE;  Surgeon: Gabe Munoz MD;  Location: [facility] OR;  Service: General;  Laterality: Left;  RN PREOP 4/11/2023    BILATERAL OOPHORECTOMY Bilateral 1/12/2015    CHOLECYSTECTOMY      DEBRIDEMENT OF FOOT Bilateral 5/10/2022    Procedure: DEBRIDEMENT, FOOT;  Surgeon: Maira De Los Santos DPM;  Location: [facility] OR;  Service: Podiatry;  Laterality: Bilateral;    DEBRIDEMENT OF FOOT Left 2/28/2023    Procedure: DEBRIDEMENT, FOOT,biopsy;  Surgeon: Maira De Los Santos DPM;  Location: [facility] OR;  Service: Podiatry;  Laterality: Left;  request misonix, wound VAC, possible neoxx    Green' s filter Right 7/4/2012    Right Neck & Tunneled Down.    HERNIA REPAIR      "[place] [facility] Hernias Repaires around th Belly Button.", pt. states    INCISION AND DRAINAGE FOOT Left 12/24/2022    Procedure: INCISION AND DRAINAGE, FOOT;  Surgeon: Fahad Razo DPM;  Location: [facility] OR;  Service: Podiatry;  Laterality: Left;    LAPAROSCOPIC CHOLECYSTECTOMY N/A " 9/10/2020    Procedure: CHOLECYSTECTOMY, LAPAROSCOPIC;  Surgeon: Montrell Gutierrez MD;  Location: [facility];  Service: General;  Laterality: N/A;  RN PREOP ----COVID Negative      OVARIAN CYST REMOVAL  3/13/2014    OK REMOVAL OF OVARY/TUBE(S)      SPLENECTOMY, TOTAL  2003    TONSILLECTOMY      as a child    TYMPANOSTOMY TUBE PLACEMENT  1976    VEIN SURGERY      Lt leg     Family History   Problem Relation Name Age of Onset    Hypertension Father      Diabetes Father      Heart disease Father      Cataracts Father      Diabetes Paternal Grandfather      Heart disease Paternal Grandfather      No Known Problems Mother      Ovarian cancer Maternal Grandmother           from this. ? age     No Known Problems Sister      No Known Problems Brother      No Known Problems Maternal Aunt      No Known Problems Maternal Uncle      No Known Problems Paternal Aunt      No Known Problems Paternal Uncle      No Known Problems Maternal Grandfather      Ovarian cancer Paternal Grandmother      Uterine cancer Neg Hx      Breast cancer Neg Hx      Colon cancer Neg Hx      Amblyopia Neg Hx      Blindness Neg Hx      Cancer Neg Hx      Glaucoma Neg Hx      Macular degeneration Neg Hx      Retinal detachment Neg Hx      Strabismus Neg Hx      Stroke Neg Hx      Thyroid disease Neg Hx       Social History     Tobacco Use    Smoking status: Former     Current packs/day: 0.00     Average packs/day: 0.5 packs/day for 37.0 years (18.5 ttl pk-yrs)     Types: Cigarettes     Start date: 1983     Quit date: 2020     Years since quitting: 3.4    Smokeless tobacco: Current    Tobacco comments:     Enrolled in the [employer] on 5/3/14 ([facility] Member ID # [id number]). Ambulatory referral to Smoking Cessation Program   Substance Use Topics    Alcohol use: No     Alcohol/week: 0.0 standard drinks of alcohol    Drug use: No     Review of Systems   Constitutional:  Negative for activity change, appetite change, chills and  fever.   HENT:  Negative for congestion, rhinorrhea, sneezing and sore throat.    Respiratory:  Negative for cough, choking, shortness of breath and wheezing.    Cardiovascular:  Negative for chest pain and palpitations.   Gastrointestinal:  Positive for nausea. Negative for abdominal pain, diarrhea and vomiting.   Neurological:  Positive for headaches. Negative for dizziness, syncope and light-headedness.   All other systems reviewed and are negative.      Physical Exam     Initial Vitals [04/30/24 1616]   BP Pulse Resp Temp SpO2   (!) 145/68 98 20 98.7 °F (37.1 °C) 95 %      MAP       --         Physical Exam    Nursing note and vitals reviewed.  Constitutional: She appears well-developed and well-nourished. No distress.   HENT:   Head: Normocephalic and atraumatic.   Eyes: Conjunctivae are normal.   Neck:   Normal range of motion.  Cardiovascular:  Normal rate, regular rhythm and normal heart sounds.           No murmur heard.  Pulmonary/Chest: Breath sounds normal. No respiratory distress.   Abdominal: Abdomen is soft. Bowel sounds are normal. She exhibits no distension. There is no abdominal tenderness.   Musculoskeletal:         General: No tenderness or edema. Normal range of motion.      Cervical back: Normal range of motion.      Comments: Left BKA     Lymphadenopathy:     She has cervical adenopathy (Right).   Neurological: She is alert and oriented to person, place, and time. GCS score is 15. GCS eye subscore is 4. GCS verbal subscore is 5. GCS motor subscore is 6.   Skin: Skin is warm and dry.   Area of erythema to the left parietal scalp.   Psychiatric: She has a normal mood and affect. Her behavior is normal.         ED Course   Procedures  Labs Reviewed   POCT CMP - Abnormal; Notable for the following components:       Result Value    Calcium, POC 10.4 (*)     POC Chloride 95 (*)     POC Glucose 418 (*)     All other components within normal limits   POCT CBC   POCT GLUCOSE, HAND-HELD DEVICE    SARS-COV-2 RDRP GENE   POCT INFLUENZA A/B MOLECULAR   POCT URINALYSIS(INSTRUMENT)   POCT CMP          Imaging Results    None          Medications   sodium chloride 0.9% bolus 1,000 mL 1,000 mL (1,000 mLs Intravenous New Bag 4/30/24 1715)   ketorolac injection 15 mg (15 mg Intravenous Given 4/30/24 1715)   ondansetron injection 4 mg (4 mg Intravenous Given 4/30/24 1715)     Medical Decision Making  Audrey Natarajan is a 53 y.o. female, with a PMHx of HTN, HLD, DVT, PE, T2DM, Bipolar I disorder, and thrombocytosis, who presents to the ED with acute headache with associated nausea and cervical lymphadenopathy that began today. Patient reports a dermatology appointment yesterday to apply liquid nitrogen to heal insect bites. She is unsure if this is related to her head pain. She states it feels like the last time she had scalp cellulitis and had to be admitted.  Patient notes compliance with atorvastatin, bumetanide, Depakote, eliquis, gabapentin, lisinopril, metformin, metoprolol, and pantoprazole. No other exacerbating or alleviating factors. Patient denies any associated fever, chills, chest pain.    On exam - Afebrile. Area of erythema and tenderness to the left parietal scalp. No skin lesion or abscess.     In shared decision making with pt, I will order labs. Will treat pain/nausea with toradol and zofran. Work up shows WBC ct 23 and glucose 418. Will add CT scan of head, UA and CXR and give IVFs to lower glucose. This work up is pending at the end of my shift. Case discussed with Dr. Briceno who assumed care of pt.       Amount and/or Complexity of Data Reviewed  External Data Reviewed: notes.     Details: See HPI.  Labs: ordered.  Radiology: ordered.            Scribe Attestation:   Scribe #1: I performed the above scribed service and the documentation accurately describes the services I performed. I attest to the accuracy of the note.              I, Dr. Jocelyn Padilla, personally performed the services  described in this documentation.   All medical record entries made by the scribe were at my direction and in my presence.   I have reviewed the chart and agree that the record is accurate and complete.   Jocelyn Padilla MD.  4:48 PM 04/30/2024                    Clinical Impression:  Final diagnoses:  [R05.9] Cough  [R51.9] Nonintractable headache, unspecified chronicity pattern, unspecified headache type (Primary)  [E11.65] Hyperglycemia due to diabetes mellitus  [L03.811] Cellulitis of head except face                 Jocelyn Padilla MD  04/30/24 0068

## 2024-05-01 LAB
ALBUMIN SERPL BCP-MCNC: 2.9 G/DL (ref 3.5–5.2)
ALP SERPL-CCNC: 68 U/L (ref 55–135)
ALT SERPL W/O P-5'-P-CCNC: 14 U/L (ref 10–44)
ANION GAP SERPL CALC-SCNC: 11 MMOL/L (ref 8–16)
AST SERPL-CCNC: 14 U/L (ref 10–40)
BACTERIA #/AREA URNS HPF: ABNORMAL /HPF
BASOPHILS # BLD AUTO: 0.12 K/UL (ref 0–0.2)
BASOPHILS NFR BLD: 0.7 % (ref 0–1.9)
BILIRUB SERPL-MCNC: 0.3 MG/DL (ref 0.1–1)
BILIRUB UR QL STRIP: ABNORMAL
BUN SERPL-MCNC: 22 MG/DL (ref 6–20)
CALCIUM SERPL-MCNC: 8.9 MG/DL (ref 8.7–10.5)
CHLORIDE SERPL-SCNC: 98 MMOL/L (ref 95–110)
CLARITY UR: ABNORMAL
CO2 SERPL-SCNC: 27 MMOL/L (ref 23–29)
COLOR UR: YELLOW
CREAT SERPL-MCNC: 0.8 MG/DL (ref 0.5–1.4)
DIFFERENTIAL METHOD BLD: ABNORMAL
EOSINOPHIL # BLD AUTO: 0.4 K/UL (ref 0–0.5)
EOSINOPHIL NFR BLD: 2.6 % (ref 0–8)
ERYTHROCYTE [DISTWIDTH] IN BLOOD BY AUTOMATED COUNT: 14.4 % (ref 11.5–14.5)
EST. GFR  (NO RACE VARIABLE): >60 ML/MIN/1.73 M^2
GLUCOSE SERPL-MCNC: 387 MG/DL (ref 70–110)
GLUCOSE UR QL STRIP: ABNORMAL
HCT VFR BLD AUTO: 38.1 % (ref 37–48.5)
HGB BLD-MCNC: 12.1 G/DL (ref 12–16)
HGB UR QL STRIP: NEGATIVE
HYALINE CASTS #/AREA URNS LPF: 0 /LPF
IMM GRANULOCYTES # BLD AUTO: 0.09 K/UL (ref 0–0.04)
IMM GRANULOCYTES NFR BLD AUTO: 0.5 % (ref 0–0.5)
KETONES UR QL STRIP: ABNORMAL
LEUKOCYTE ESTERASE UR QL STRIP: ABNORMAL
LYMPHOCYTES # BLD AUTO: 4 K/UL (ref 1–4.8)
LYMPHOCYTES NFR BLD: 24.2 % (ref 18–48)
MAGNESIUM SERPL-MCNC: 1.3 MG/DL (ref 1.6–2.6)
MCH RBC QN AUTO: 27.6 PG (ref 27–31)
MCHC RBC AUTO-ENTMCNC: 31.8 G/DL (ref 32–36)
MCV RBC AUTO: 87 FL (ref 82–98)
MICROSCOPIC COMMENT: ABNORMAL
MONOCYTES # BLD AUTO: 2 K/UL (ref 0.3–1)
MONOCYTES NFR BLD: 12.3 % (ref 4–15)
NEUTROPHILS # BLD AUTO: 9.8 K/UL (ref 1.8–7.7)
NEUTROPHILS NFR BLD: 59.7 % (ref 38–73)
NITRITE UR QL STRIP: NEGATIVE
NRBC BLD-RTO: 0 /100 WBC
PH UR STRIP: 6 [PH] (ref 5–8)
PHOSPHATE SERPL-MCNC: 5.8 MG/DL (ref 2.7–4.5)
PLATELET # BLD AUTO: 382 K/UL (ref 150–450)
PMV BLD AUTO: 11.4 FL (ref 9.2–12.9)
POCT GLUCOSE: 307 MG/DL (ref 70–110)
POCT GLUCOSE: 324 MG/DL (ref 70–110)
POCT GLUCOSE: 328 MG/DL (ref 70–110)
POCT GLUCOSE: 417 MG/DL (ref 70–110)
POTASSIUM SERPL-SCNC: 5.2 MMOL/L (ref 3.5–5.1)
PROT SERPL-MCNC: 5.4 G/DL (ref 6–8.4)
PROT UR QL STRIP: ABNORMAL
RBC # BLD AUTO: 4.38 M/UL (ref 4–5.4)
RBC #/AREA URNS HPF: 0 /HPF (ref 0–4)
SODIUM SERPL-SCNC: 136 MMOL/L (ref 136–145)
SP GR UR STRIP: >1.03 (ref 1–1.03)
SQUAMOUS #/AREA URNS HPF: 3 /HPF
URN SPEC COLLECT METH UR: ABNORMAL
UROBILINOGEN UR STRIP-ACNC: ABNORMAL EU/DL
WBC # BLD AUTO: 16.51 K/UL (ref 3.9–12.7)
WBC #/AREA URNS HPF: 28 /HPF (ref 0–5)
YEAST URNS QL MICRO: ABNORMAL

## 2024-05-01 PROCEDURE — C1751 CATH, INF, PER/CENT/MIDLINE: HCPCS

## 2024-05-01 PROCEDURE — 25000003 PHARM REV CODE 250: Performed by: STUDENT IN AN ORGANIZED HEALTH CARE EDUCATION/TRAINING PROGRAM

## 2024-05-01 PROCEDURE — 80053 COMPREHEN METABOLIC PANEL: CPT | Performed by: STUDENT IN AN ORGANIZED HEALTH CARE EDUCATION/TRAINING PROGRAM

## 2024-05-01 PROCEDURE — 36410 VNPNXR 3YR/> PHY/QHP DX/THER: CPT

## 2024-05-01 PROCEDURE — 87147 CULTURE TYPE IMMUNOLOGIC: CPT | Performed by: STUDENT IN AN ORGANIZED HEALTH CARE EDUCATION/TRAINING PROGRAM

## 2024-05-01 PROCEDURE — 83735 ASSAY OF MAGNESIUM: CPT | Performed by: STUDENT IN AN ORGANIZED HEALTH CARE EDUCATION/TRAINING PROGRAM

## 2024-05-01 PROCEDURE — 87088 URINE BACTERIA CULTURE: CPT | Performed by: STUDENT IN AN ORGANIZED HEALTH CARE EDUCATION/TRAINING PROGRAM

## 2024-05-01 PROCEDURE — 87040 BLOOD CULTURE FOR BACTERIA: CPT | Mod: 59 | Performed by: STUDENT IN AN ORGANIZED HEALTH CARE EDUCATION/TRAINING PROGRAM

## 2024-05-01 PROCEDURE — 85025 COMPLETE CBC W/AUTO DIFF WBC: CPT | Performed by: STUDENT IN AN ORGANIZED HEALTH CARE EDUCATION/TRAINING PROGRAM

## 2024-05-01 PROCEDURE — 81000 URINALYSIS NONAUTO W/SCOPE: CPT | Performed by: STUDENT IN AN ORGANIZED HEALTH CARE EDUCATION/TRAINING PROGRAM

## 2024-05-01 PROCEDURE — 87086 URINE CULTURE/COLONY COUNT: CPT | Performed by: STUDENT IN AN ORGANIZED HEALTH CARE EDUCATION/TRAINING PROGRAM

## 2024-05-01 PROCEDURE — 84100 ASSAY OF PHOSPHORUS: CPT | Performed by: STUDENT IN AN ORGANIZED HEALTH CARE EDUCATION/TRAINING PROGRAM

## 2024-05-01 PROCEDURE — 94761 N-INVAS EAR/PLS OXIMETRY MLT: CPT

## 2024-05-01 PROCEDURE — 36415 COLL VENOUS BLD VENIPUNCTURE: CPT | Performed by: STUDENT IN AN ORGANIZED HEALTH CARE EDUCATION/TRAINING PROGRAM

## 2024-05-01 PROCEDURE — 63600175 PHARM REV CODE 636 W HCPCS: Performed by: STUDENT IN AN ORGANIZED HEALTH CARE EDUCATION/TRAINING PROGRAM

## 2024-05-01 PROCEDURE — 11000001 HC ACUTE MED/SURG PRIVATE ROOM

## 2024-05-01 PROCEDURE — 25000003 PHARM REV CODE 250: Performed by: PHYSICIAN ASSISTANT

## 2024-05-01 RX ORDER — ONDANSETRON HYDROCHLORIDE 2 MG/ML
4 INJECTION, SOLUTION INTRAVENOUS EVERY 8 HOURS PRN
Status: DISCONTINUED | OUTPATIENT
Start: 2024-05-01 | End: 2024-05-04 | Stop reason: HOSPADM

## 2024-05-01 RX ORDER — HYDROCODONE BITARTRATE AND ACETAMINOPHEN 5; 325 MG/1; MG/1
2 TABLET ORAL EVERY 6 HOURS PRN
Status: DISCONTINUED | OUTPATIENT
Start: 2024-05-01 | End: 2024-05-03

## 2024-05-01 RX ORDER — SODIUM,POTASSIUM PHOSPHATES 280-250MG
2 POWDER IN PACKET (EA) ORAL
Status: DISCONTINUED | OUTPATIENT
Start: 2024-05-01 | End: 2024-05-04 | Stop reason: HOSPADM

## 2024-05-01 RX ORDER — IBUPROFEN 200 MG
24 TABLET ORAL
Status: DISCONTINUED | OUTPATIENT
Start: 2024-05-01 | End: 2024-05-04 | Stop reason: HOSPADM

## 2024-05-01 RX ORDER — MICONAZOLE NITRATE 2 %
POWDER (GRAM) TOPICAL 2 TIMES DAILY
Status: DISCONTINUED | OUTPATIENT
Start: 2024-05-01 | End: 2024-05-04 | Stop reason: HOSPADM

## 2024-05-01 RX ORDER — ACETAMINOPHEN 325 MG/1
650 TABLET ORAL EVERY 8 HOURS PRN
Status: DISCONTINUED | OUTPATIENT
Start: 2024-05-01 | End: 2024-05-03

## 2024-05-01 RX ORDER — QUETIAPINE FUMARATE 100 MG/1
200 TABLET, FILM COATED ORAL NIGHTLY
Status: DISCONTINUED | OUTPATIENT
Start: 2024-05-01 | End: 2024-05-04 | Stop reason: HOSPADM

## 2024-05-01 RX ORDER — SIMETHICONE 80 MG
1 TABLET,CHEWABLE ORAL 4 TIMES DAILY PRN
Status: DISCONTINUED | OUTPATIENT
Start: 2024-05-01 | End: 2024-05-04 | Stop reason: HOSPADM

## 2024-05-01 RX ORDER — BISACODYL 10 MG/1
10 SUPPOSITORY RECTAL DAILY PRN
Status: DISCONTINUED | OUTPATIENT
Start: 2024-05-01 | End: 2024-05-04 | Stop reason: HOSPADM

## 2024-05-01 RX ORDER — LANOLIN ALCOHOL/MO/W.PET/CERES
800 CREAM (GRAM) TOPICAL
Status: DISCONTINUED | OUTPATIENT
Start: 2024-05-01 | End: 2024-05-04 | Stop reason: HOSPADM

## 2024-05-01 RX ORDER — PANTOPRAZOLE SODIUM 40 MG/1
40 TABLET, DELAYED RELEASE ORAL DAILY
Status: DISCONTINUED | OUTPATIENT
Start: 2024-05-01 | End: 2024-05-04 | Stop reason: HOSPADM

## 2024-05-01 RX ORDER — METOPROLOL TARTRATE 25 MG/1
25 TABLET, FILM COATED ORAL 2 TIMES DAILY
Status: DISCONTINUED | OUTPATIENT
Start: 2024-05-01 | End: 2024-05-03

## 2024-05-01 RX ORDER — INSULIN ASPART 100 [IU]/ML
0-5 INJECTION, SOLUTION INTRAVENOUS; SUBCUTANEOUS
Status: DISCONTINUED | OUTPATIENT
Start: 2024-05-01 | End: 2024-05-04 | Stop reason: HOSPADM

## 2024-05-01 RX ORDER — ACETAMINOPHEN 325 MG/1
650 TABLET ORAL EVERY 4 HOURS PRN
Status: DISCONTINUED | OUTPATIENT
Start: 2024-05-01 | End: 2024-05-03

## 2024-05-01 RX ORDER — GABAPENTIN 300 MG/1
600 CAPSULE ORAL 3 TIMES DAILY
Status: DISCONTINUED | OUTPATIENT
Start: 2024-05-01 | End: 2024-05-04 | Stop reason: HOSPADM

## 2024-05-01 RX ORDER — DIVALPROEX SODIUM 250 MG/1
500 TABLET, DELAYED RELEASE ORAL NIGHTLY
Status: DISCONTINUED | OUTPATIENT
Start: 2024-05-01 | End: 2024-05-04 | Stop reason: HOSPADM

## 2024-05-01 RX ORDER — PROCHLORPERAZINE EDISYLATE 5 MG/ML
5 INJECTION INTRAMUSCULAR; INTRAVENOUS EVERY 6 HOURS PRN
Status: DISCONTINUED | OUTPATIENT
Start: 2024-05-01 | End: 2024-05-04 | Stop reason: HOSPADM

## 2024-05-01 RX ORDER — ATORVASTATIN CALCIUM 40 MG/1
40 TABLET, FILM COATED ORAL DAILY
Status: DISCONTINUED | OUTPATIENT
Start: 2024-05-01 | End: 2024-05-04 | Stop reason: HOSPADM

## 2024-05-01 RX ORDER — SODIUM CHLORIDE 0.9 % (FLUSH) 0.9 %
10 SYRINGE (ML) INJECTION
Status: DISCONTINUED | OUTPATIENT
Start: 2024-05-01 | End: 2024-05-04 | Stop reason: HOSPADM

## 2024-05-01 RX ORDER — INSULIN ASPART 100 [IU]/ML
5 INJECTION, SOLUTION INTRAVENOUS; SUBCUTANEOUS 3 TIMES DAILY
Status: DISCONTINUED | OUTPATIENT
Start: 2024-05-01 | End: 2024-05-01

## 2024-05-01 RX ORDER — SODIUM CHLORIDE 0.9 % (FLUSH) 0.9 %
10 SYRINGE (ML) INJECTION EVERY 6 HOURS
Status: DISCONTINUED | OUTPATIENT
Start: 2024-05-02 | End: 2024-05-04 | Stop reason: HOSPADM

## 2024-05-01 RX ORDER — LISINOPRIL 5 MG/1
10 TABLET ORAL DAILY
Status: DISCONTINUED | OUTPATIENT
Start: 2024-05-01 | End: 2024-05-03

## 2024-05-01 RX ORDER — SODIUM CHLORIDE 0.9 % (FLUSH) 0.9 %
10 SYRINGE (ML) INJECTION EVERY 12 HOURS PRN
Status: DISCONTINUED | OUTPATIENT
Start: 2024-05-01 | End: 2024-05-04 | Stop reason: HOSPADM

## 2024-05-01 RX ORDER — HYDROCODONE BITARTRATE AND ACETAMINOPHEN 5; 325 MG/1; MG/1
1 TABLET ORAL EVERY 6 HOURS PRN
Status: DISCONTINUED | OUTPATIENT
Start: 2024-05-01 | End: 2024-05-01

## 2024-05-01 RX ORDER — IPRATROPIUM BROMIDE AND ALBUTEROL SULFATE 2.5; .5 MG/3ML; MG/3ML
3 SOLUTION RESPIRATORY (INHALATION) EVERY 4 HOURS PRN
Status: DISCONTINUED | OUTPATIENT
Start: 2024-05-01 | End: 2024-05-04 | Stop reason: HOSPADM

## 2024-05-01 RX ORDER — RISPERIDONE 1 MG/1
3 TABLET ORAL 2 TIMES DAILY
Status: DISCONTINUED | OUTPATIENT
Start: 2024-05-01 | End: 2024-05-04 | Stop reason: HOSPADM

## 2024-05-01 RX ORDER — TALC
6 POWDER (GRAM) TOPICAL NIGHTLY PRN
Status: DISCONTINUED | OUTPATIENT
Start: 2024-05-01 | End: 2024-05-04 | Stop reason: HOSPADM

## 2024-05-01 RX ORDER — IBUPROFEN 200 MG
16 TABLET ORAL
Status: DISCONTINUED | OUTPATIENT
Start: 2024-05-01 | End: 2024-05-04 | Stop reason: HOSPADM

## 2024-05-01 RX ORDER — INSULIN ASPART 100 [IU]/ML
8 INJECTION, SOLUTION INTRAVENOUS; SUBCUTANEOUS 3 TIMES DAILY
Status: DISCONTINUED | OUTPATIENT
Start: 2024-05-01 | End: 2024-05-02

## 2024-05-01 RX ORDER — HYDROXYZINE HYDROCHLORIDE 25 MG/1
50 TABLET, FILM COATED ORAL 3 TIMES DAILY PRN
Status: DISCONTINUED | OUTPATIENT
Start: 2024-05-01 | End: 2024-05-04 | Stop reason: HOSPADM

## 2024-05-01 RX ORDER — SODIUM CHLORIDE 9 MG/ML
INJECTION, SOLUTION INTRAVENOUS CONTINUOUS
Status: ACTIVE | OUTPATIENT
Start: 2024-05-01 | End: 2024-05-01

## 2024-05-01 RX ORDER — MAGNESIUM SULFATE HEPTAHYDRATE 40 MG/ML
2 INJECTION, SOLUTION INTRAVENOUS ONCE
Status: COMPLETED | OUTPATIENT
Start: 2024-05-01 | End: 2024-05-01

## 2024-05-01 RX ORDER — NALOXONE HCL 0.4 MG/ML
0.02 VIAL (ML) INJECTION
Status: DISCONTINUED | OUTPATIENT
Start: 2024-05-01 | End: 2024-05-04 | Stop reason: HOSPADM

## 2024-05-01 RX ORDER — GLUCAGON 1 MG
1 KIT INJECTION
Status: DISCONTINUED | OUTPATIENT
Start: 2024-05-01 | End: 2024-05-04 | Stop reason: HOSPADM

## 2024-05-01 RX ORDER — BUMETANIDE 1 MG/1
1 TABLET ORAL DAILY
Status: DISCONTINUED | OUTPATIENT
Start: 2024-05-01 | End: 2024-05-03

## 2024-05-01 RX ORDER — QUETIAPINE FUMARATE 100 MG/1
200 TABLET, FILM COATED ORAL
Status: DISCONTINUED | OUTPATIENT
Start: 2024-05-01 | End: 2024-05-01

## 2024-05-01 RX ORDER — ALUMINUM HYDROXIDE, MAGNESIUM HYDROXIDE, AND SIMETHICONE 1200; 120; 1200 MG/30ML; MG/30ML; MG/30ML
30 SUSPENSION ORAL 4 TIMES DAILY PRN
Status: DISCONTINUED | OUTPATIENT
Start: 2024-05-01 | End: 2024-05-04 | Stop reason: HOSPADM

## 2024-05-01 RX ORDER — NAPROXEN SODIUM 220 MG/1
81 TABLET, FILM COATED ORAL DAILY
Status: DISCONTINUED | OUTPATIENT
Start: 2024-05-01 | End: 2024-05-04 | Stop reason: HOSPADM

## 2024-05-01 RX ORDER — POLYETHYLENE GLYCOL 3350 17 G/17G
17 POWDER, FOR SOLUTION ORAL DAILY
Status: DISCONTINUED | OUTPATIENT
Start: 2024-05-01 | End: 2024-05-03

## 2024-05-01 RX ADMIN — HYDROCODONE BITARTRATE AND ACETAMINOPHEN 1 TABLET: 5; 325 TABLET ORAL at 10:05

## 2024-05-01 RX ADMIN — VANCOMYCIN HYDROCHLORIDE 1750 MG: 500 INJECTION, POWDER, LYOPHILIZED, FOR SOLUTION INTRAVENOUS at 11:05

## 2024-05-01 RX ADMIN — METOPROLOL TARTRATE 25 MG: 25 TABLET, FILM COATED ORAL at 08:05

## 2024-05-01 RX ADMIN — MICONAZOLE NITRATE: 20 POWDER TOPICAL at 08:05

## 2024-05-01 RX ADMIN — ONDANSETRON 4 MG: 2 INJECTION INTRAMUSCULAR; INTRAVENOUS at 09:05

## 2024-05-01 RX ADMIN — MICONAZOLE NITRATE: 20 POWDER TOPICAL at 09:05

## 2024-05-01 RX ADMIN — INSULIN ASPART 4 UNITS: 100 INJECTION, SOLUTION INTRAVENOUS; SUBCUTANEOUS at 08:05

## 2024-05-01 RX ADMIN — RISPERIDONE 3 MG: 1 TABLET, FILM COATED ORAL at 08:05

## 2024-05-01 RX ADMIN — INSULIN ASPART 8 UNITS: 100 INJECTION, SOLUTION INTRAVENOUS; SUBCUTANEOUS at 09:05

## 2024-05-01 RX ADMIN — RISPERIDONE 3 MG: 1 TABLET, FILM COATED ORAL at 09:05

## 2024-05-01 RX ADMIN — LISINOPRIL 10 MG: 5 TABLET ORAL at 08:05

## 2024-05-01 RX ADMIN — ATORVASTATIN CALCIUM 40 MG: 40 TABLET, FILM COATED ORAL at 08:05

## 2024-05-01 RX ADMIN — Medication 6 MG: at 09:05

## 2024-05-01 RX ADMIN — GABAPENTIN 600 MG: 300 CAPSULE ORAL at 09:05

## 2024-05-01 RX ADMIN — INSULIN ASPART 8 UNITS: 100 INJECTION, SOLUTION INTRAVENOUS; SUBCUTANEOUS at 02:05

## 2024-05-01 RX ADMIN — HYDROXYZINE HYDROCHLORIDE 50 MG: 25 TABLET ORAL at 04:05

## 2024-05-01 RX ADMIN — ASPIRIN 81 MG CHEWABLE TABLET 81 MG: 81 TABLET CHEWABLE at 08:05

## 2024-05-01 RX ADMIN — METOPROLOL TARTRATE 25 MG: 25 TABLET, FILM COATED ORAL at 09:05

## 2024-05-01 RX ADMIN — GABAPENTIN 600 MG: 300 CAPSULE ORAL at 02:05

## 2024-05-01 RX ADMIN — QUETIAPINE 200 MG: 100 TABLET ORAL at 04:05

## 2024-05-01 RX ADMIN — INSULIN ASPART 4 UNITS: 100 INJECTION, SOLUTION INTRAVENOUS; SUBCUTANEOUS at 04:05

## 2024-05-01 RX ADMIN — DIVALPROEX SODIUM 500 MG: 250 TABLET, DELAYED RELEASE ORAL at 04:05

## 2024-05-01 RX ADMIN — DIVALPROEX SODIUM 500 MG: 250 TABLET, DELAYED RELEASE ORAL at 09:05

## 2024-05-01 RX ADMIN — INSULIN ASPART 5 UNITS: 100 INJECTION, SOLUTION INTRAVENOUS; SUBCUTANEOUS at 12:05

## 2024-05-01 RX ADMIN — HYDROCODONE BITARTRATE AND ACETAMINOPHEN 1 TABLET: 5; 325 TABLET ORAL at 04:05

## 2024-05-01 RX ADMIN — INSULIN DETEMIR 15 UNITS: 100 INJECTION, SOLUTION SUBCUTANEOUS at 08:05

## 2024-05-01 RX ADMIN — INSULIN ASPART 5 UNITS: 100 INJECTION, SOLUTION INTRAVENOUS; SUBCUTANEOUS at 08:05

## 2024-05-01 RX ADMIN — SODIUM CHLORIDE: 9 INJECTION, SOLUTION INTRAVENOUS at 09:05

## 2024-05-01 RX ADMIN — MAGNESIUM SULFATE HEPTAHYDRATE 2 G: 40 INJECTION, SOLUTION INTRAVENOUS at 09:05

## 2024-05-01 RX ADMIN — INSULIN ASPART 2 UNITS: 100 INJECTION, SOLUTION INTRAVENOUS; SUBCUTANEOUS at 09:05

## 2024-05-01 RX ADMIN — APIXABAN 5 MG: 5 TABLET, FILM COATED ORAL at 08:05

## 2024-05-01 RX ADMIN — VANCOMYCIN HYDROCHLORIDE 1750 MG: 500 INJECTION, POWDER, LYOPHILIZED, FOR SOLUTION INTRAVENOUS at 09:05

## 2024-05-01 RX ADMIN — QUETIAPINE 200 MG: 100 TABLET ORAL at 09:05

## 2024-05-01 RX ADMIN — PANTOPRAZOLE SODIUM 40 MG: 40 TABLET, DELAYED RELEASE ORAL at 08:05

## 2024-05-01 RX ADMIN — BUMETANIDE 1 MG: 1 TABLET ORAL at 08:05

## 2024-05-01 RX ADMIN — GABAPENTIN 600 MG: 300 CAPSULE ORAL at 08:05

## 2024-05-01 RX ADMIN — HYDROCODONE BITARTRATE AND ACETAMINOPHEN 2 TABLET: 5; 325 TABLET ORAL at 09:05

## 2024-05-01 RX ADMIN — APIXABAN 5 MG: 5 TABLET, FILM COATED ORAL at 09:05

## 2024-05-01 NOTE — CARE UPDATE
53-year-old female with a past medical history of COPD, type 2 diabetes, hypertension, hyperlipidemia, peripheral artery disease, bipolar disorder, history of VTE (on Eliquis), tobacco use, SHAWN, s/p left BKA, gastroparesis admitted on 04/30/2024 for facial cellulitis.  Presented with a-on her face, scalp, back, and chest after multiple insect bite.  Sites with localized erythema, concerning for developing cellulitis.  Also found to have positive blood culture with Gram-positive rods.  Repeat blood culture ordered.  Urine culture pending.  CT head with no acute process.  Patient initiated on vancomycin.     Patient also found to have hyperkalemia, hyperphosphatemia, hypomagnesemia.  Electrolytes replaced as needed.  Patient also with uncontrolled diabetes given hypoglycemia.  Recent A1c 11.  Resume home insulin regimen, titrate as needed.  Await for final blood cultures, consider Infectious Disease consult once cultures finalized.    I reviewed the patient's medications, past medical/surgical history and the H&P note. I agree with the physical exam and assessment and plan.

## 2024-05-01 NOTE — ASSESSMENT & PLAN NOTE
Chronic, controlled. Latest blood pressure and vitals reviewed-     Temp:  [98.7 °F (37.1 °C)]   Pulse:  [89-98]   Resp:  [20]   BP: (112-145)/(58-68)   SpO2:  [91 %-97 %] .   Home meds for hypertension were reviewed and noted below.   Hypertension Medications               bumetanide (BUMEX) 1 MG tablet Take 1 tablet (1 mg total) by mouth once daily.    lisinopriL 10 MG tablet Take 1 tablet (10 mg total) by mouth once daily.    metoprolol tartrate (LOPRESSOR) 25 MG tablet Take 1 tablet (25 mg total) by mouth 2 (two) times daily.            While in the hospital, will manage blood pressure as follows; Continue home antihypertensive regimen    Will utilize p.r.n. blood pressure medication only if patient's blood pressure greater than 180/110 and she develops symptoms such as worsening chest pain or shortness of breath.

## 2024-05-01 NOTE — ED NOTES
PT RESTING IN STRETCHER WITH EYES CLOSED AT PRESENT TIME, RESPIRATIONS EVEN AND UNLABORED AT 20-22, NO S/S OF DISTRESS NOTED, AROUSES EASILY TO VERBAL STIMULI, UPDATED PT ON LATEST ETA FOR [facility]ASI, PT VERBALIZES UNDERSTANDING, CALL WEINSTEIN REMAINS IN REACH, SIDE RAILS UP X 2.

## 2024-05-01 NOTE — ASSESSMENT & PLAN NOTE
Presents with facial/scalp, back and chest rash after an insect bite  Multiple sites with localized erythema. Concerning for developing cellulitis.   CT head showed no acute process   Continue vancomycin

## 2024-05-01 NOTE — PROGRESS NOTES
"Pharmacokinetic Initial Assessment: IV Vancomycin    Assessment/Plan:    Initiate intravenous vancomycin with loading dose of 2000 mg once followed by a maintenance dose of vancomycin 1750 mg IV every 12 hours  Desired empiric serum trough concentration is 10 to 20 mcg/mL  Draw vancomycin trough level 60 min prior to fourth dose on 5/2/24 at approximately 0900  Pharmacy will continue to follow and monitor vancomycin.      Please contact pharmacy at extension [phone] with any questions regarding this assessment.     Thank you for the consult,   Graham Anderson       Patient brief summary:  Audrey Natarajan is a 53 y.o. female initiated on antimicrobial therapy with IV Vancomycin for treatment of suspected skin & soft tissue infection    Drug Allergies:   Review of patient's allergies indicates:   Allergen Reactions    Morphine Other (See Comments)     Patient had a psychotic episode after taking Morphine  Agitation, hallucinations  Other Reaction(s): Other (See Comments), Other (See Comments)    Patient had a psychotic episode after taking Morphine    Patient had a psychotic episode after taking Morphine Agitation, hallucinations    Penicillins Anaphylaxis     Tolerated cephalosporins in the past    Januvia [sitagliptin] Hives    Carbamazepine Other (See Comments)     hyponatremia  Other Reaction(s): Other (See Comments)    hyponatremia       Actual Body Weight:   104.3 kg    Renal Function:   CrCl cannot be calculated (Patient's most recent lab result is older than the maximum 7 days allowed.).,     Dialysis Method (if applicable):  N/A    CBC (last 72 hours):  No results for input(s): "WHITE BLOOD CELL COUNT", "HEMOGLOBIN", "HEMATOCRIT", "PLATELETS", "GRAN%", "LYMPH%", "MONO%", "EOSINOPHIL%", "BASOPHIL%", "DIFFERENTIAL METHOD" in the last 72 hours.    Metabolic Panel (last 72 hours):  No results for input(s): "SODIUM", "POTASSIUM", "CHLORIDE", "CO2", "GLUCOSE", "BUN BLD", "CREATININE", "ALBUMIN", "BILIRUBIN " "TOTAL", "ALK PHOS", "AST", "ALT", "MAGNESIUM", "PHOSPHORUS" in the last 72 hours.    Drug levels (last 3 results):  No results for input(s): "VANCOMYCINRA", "VANCORANDOM", "VANCOMYCINPE", "VANCOPEAK", "VANCOMYCINTR", "VANCOTROUGH" in the last 72 hours.    Microbiologic Results:  Microbiology Results (last 7 days)       Procedure Component Value Units Date/Time    Blood culture #1 **CANNOT BE ORDERED STAT** [[id number]] Collected: 04/30/24 2127    Order Status: Sent Specimen: Blood from Peripheral, Forearm, Left Updated: 04/30/24 2342    Blood culture #2 **CANNOT BE ORDERED STAT** [[id number]] Collected: 04/30/24 2136    Order Status: Sent Specimen: Blood from Peripheral, Antecubital, Right Updated: 04/30/24 2342            "

## 2024-05-01 NOTE — HPI
This is a 53-year-old female with a past medical history of COPD, type 2 diabetes, hypertension, hyperlipidemia, peripheral artery disease, bipolar disorder, history of VTE (on Eliquis), tobacco use, SHAWN, s/p left BKA, gastroparesis who presents with facial rash.     Patient presents for evaluation of multiple bumps noted on her face, scalp, back and chest after getting bit by mosquitos. She reports getting bit by multiple mosquitos and gnats about a week and a half prior to presentation. She went to dermatology and got liquid nitrogen treatment and later developed a worsening pulsating frontal headache. She reports similar symptoms with prior episodes of cellulitis.     In the ED, the patient was hemodynamically stable.  Labs were remarkable for leukocytosis (23.3), hyperglycemia (418).  CT head showed no acute intracranial abnormality.  Chest x-ray showed no acute process.  Patient was given 1 L of LR, 1 L of NS, Toradol 15 mg IV, Zofran 4 mg IV, Tylenol 1 g p.o., insulin 60 units IV, and was started on vancomycin.  She was admitted for further management.

## 2024-05-01 NOTE — NURSING
Pt transferred to [facility] via stretcher. Pivot to bed, Left BKA noted. VSS. Pt oriented to room and call light, bed locked and in lowest position, bedside table and call light in reach. Bedside commode in reach. Pt in no distress.     [facility] [place] 4 [place]      5/1/2024       Skin assessed on: Admit      [x] No Pressure Injuries Present    [x]Prevention Measures Documented    [] Yes LDA  for Pressure Injury Previously documented     [] Yes New Pressure Injury Discovered   [] LDA for New Pressure Injury Added      Attending RN:  Evy Arredondo, RN     Second: Mayo, PCT

## 2024-05-01 NOTE — H&P
[facility]  History & Physical    Patient Name: Audrey Natarajan  MRN: [id number]  Patient Class: IP- Inpatient  Admission Date: 4/30/2024  Attending Physician: Reggie Dick,*   Primary Care Provider: Donaldo Pena MD         Patient information was obtained from patient and ER records.     Subjective:     Principal Problem:Facial cellulitis    Chief Complaint:   Chief Complaint   Patient presents with    Headache     Pt got her insect bites cauterized yesterday by derm and began with HA.         HPI: This is a 53-year-old female with a past medical history of COPD, type 2 diabetes, hypertension, hyperlipidemia, peripheral artery disease, bipolar disorder, history of VTE (on Eliquis), tobacco use, SHAWN, s/p left BKA, gastroparesis who presents with facial rash.     Patient presents for evaluation of multiple bumps noted on her face, scalp, back and chest after getting bit by mosquitos. She reports getting bit by multiple mosquitos and gnats about a week and a half prior to presentation. She went to dermatology and got liquid nitrogen treatment and later developed a worsening pulsating frontal headache. She reports similar symptoms with prior episodes of cellulitis.     In the ED, the patient was hemodynamically stable.  Labs were remarkable for leukocytosis (23.3), hyperglycemia (418).  CT head showed no acute intracranial abnormality.  Chest x-ray showed no acute process.  Patient was given 1 L of LR, 1 L of NS, Toradol 15 mg IV, Zofran 4 mg IV, Tylenol 1 g p.o., insulin 60 units IV, and was started on vancomycin.  She was admitted for further management.    Past Medical History:   Diagnosis Date    ADHD (attention deficit hyperactivity disorder)     Arthritis     Asthma     Bipolar 1 disorder     Cataract     Cigarette smoker     COPD (chronic obstructive pulmonary disease)     Coronary artery disease     A fib    Depression     bipolar manic depresson    Diabetes  "mellitus     Diabetic foot ulcers     Diabetic neuropathy     DVT of lower extremity, bilateral 07/2013    bilateral LE DVT. Estelita filter placed.     Encounter for blood transfusion     History of blood clots 1. Left Leg=2003; 2.Bilateral Groin=Blood Clots= 5 or 6/ 2013 & 7/2013; 3. LLL of Lung=7/2013;  4. Lt. Lower Leg=7/2013.     Pt. had 1st Blood Clot after Ulhvrkogwccf=5931, & Last=2013. [place] Filter= Rt.Lateral Neck.    HTN (hypertension) 06/06/2013    Pt states that she does not have hypertension    Hypercholesteremia     Irregular heartbeat     Neuromuscular disorder     neuropathy feet    Obese     PE (pulmonary embolism) 07/2013    bilat LE DVT.     Restless leg syndrome        Past Surgical History:   Procedure Laterality Date    ABDOMINAL SURGERY  2010    gastric sleeve    BELOW KNEE AMPUTATION OF LOWER EXTREMITY Left 4/19/2023    Procedure: AMPUTATION, BELOW KNEE;  Surgeon: Gabe Munoz MD;  Location: [facility] OR;  Service: General;  Laterality: Left;  RN PREOP 4/11/2023    BILATERAL OOPHORECTOMY Bilateral 1/12/2015    CHOLECYSTECTOMY      DEBRIDEMENT OF FOOT Bilateral 5/10/2022    Procedure: DEBRIDEMENT, FOOT;  Surgeon: Maira De Los Santos DPM;  Location: [facility] OR;  Service: Podiatry;  Laterality: Bilateral;    DEBRIDEMENT OF FOOT Left 2/28/2023    Procedure: DEBRIDEMENT, FOOT,biopsy;  Surgeon: Maira De Los Santos DPM;  Location: [facility] OR;  Service: Podiatry;  Laterality: Left;  request misonix, wound VAC, possible neoxx    Green' s filter Right 7/4/2012    Right Neck & Tunneled Down.    HERNIA REPAIR      "[place] Repaires around th Belly [place].", pt. states    INCISION AND DRAINAGE FOOT Left 12/24/2022    Procedure: INCISION AND DRAINAGE, FOOT;  Surgeon: Fahad Razo DPM;  Location: [facility] OR;  Service: Podiatry;  Laterality: Left;    LAPAROSCOPIC CHOLECYSTECTOMY N/A 9/10/2020    Procedure: CHOLECYSTECTOMY, LAPAROSCOPIC;  Surgeon: Montrell Gutierrez MD;  Location: [facility] OR;  Service: General;  " Laterality: N/A;  RN PREOP 9/9----COVID Negative  9/9    OVARIAN CYST REMOVAL  3/13/2014    NE REMOVAL OF OVARY/TUBE(S)      SPLENECTOMY, TOTAL  July 2003    TONSILLECTOMY      as a child    TYMPANOSTOMY TUBE PLACEMENT  1976    VEIN SURGERY  2003    Lt leg       Review of patient's allergies indicates:   Allergen Reactions    Morphine Other (See Comments)     Patient had a psychotic episode after taking Morphine  Agitation, hallucinations  Other Reaction(s): Other (See Comments), Other (See Comments)    Patient had a psychotic episode after taking Morphine    Patient had a psychotic episode after taking Morphine Agitation, hallucinations    Penicillins Anaphylaxis     Tolerated cephalosporins in the past    Januvia [sitagliptin] Hives    Carbamazepine Other (See Comments)     hyponatremia  Other Reaction(s): Other (See Comments)    hyponatremia       Current Facility-Administered Medications   Medication Dose Route Frequency Provider Last Rate Last Admin    acetaminophen tablet 650 mg  650 mg Oral Q8H PRN Favian Mcdaniel MD        acetaminophen tablet 650 mg  650 mg Oral Q4H PRN Favian Mcdaniel MD        albuterol-ipratropium 2.5 mg-0.5 mg/3 mL nebulizer solution 3 mL  3 mL Nebulization Q4H PRN Favian Mcdaniel MD        aluminum-magnesium hydroxide-simethicone 200-200-20 mg/5 mL suspension 30 mL  30 mL Oral QID PRN Favian Mcdaniel MD        apixaban tablet 5 mg  5 mg Oral BID Favian Mcdaniel MD        aspirin chewable tablet 81 mg  81 mg Oral Daily Favian Mcdaniel MD        atorvastatin tablet 40 mg  40 mg Oral Daily Favian Mcdaniel MD        bisacodyL suppository 10 mg  10 mg Rectal Daily PRN Favian Mcdaniel MD        bumetanide tablet 1 mg  1 mg Oral Daily Favian Mcdaniel MD        dextrose 10% bolus 125 mL 125 mL  12.5 g Intravenous PRN Favian Mcdaniel MD        dextrose 10% bolus 250 mL 250 mL  25 g Intravenous PRN Favian Mcdaniel MD        divalproex EC tablet 500 mg  500 mg Oral QHS Favian Mcdaniel MD        gabapentin capsule 600 mg   600 mg Oral TID Favian Mcdaniel MD        glucagon (human recombinant) injection 1 mg  1 mg Intramuscular PRN Favian Mcdaniel MD        glucose chewable tablet 16 g  16 g Oral PRN Favian Mcdaniel MD        glucose chewable tablet 24 g  24 g Oral PRN Favian Mcdaniel MD        HYDROcodone-acetaminophen 5-325 mg per tablet 1 tablet  1 tablet Oral Q6H PRN Favian Mcdaniel MD        insulin aspart U-100 pen 0-5 Units  0-5 Units Subcutaneous QID (AC + HS) PRN Favian Mcdaniel MD        insulin aspart U-100 pen 5 Units  5 Units Subcutaneous TID Favian Mcdaniel MD        insulin detemir U-100 (Levemir) pen 15 Units  15 Units Subcutaneous BID Favian Mcdaniel MD        lisinopriL tablet 10 mg  10 mg Oral Daily Favian Mcdaniel MD        magnesium oxide tablet 800 mg  800 mg Oral PRN aFvian Mcdaniel MD        magnesium oxide tablet 800 mg  800 mg Oral PRN Favian Mcdaniel MD        melatonin tablet 6 mg  6 mg Oral Nightly PRN Favian Mcdaniel MD        metoprolol tartrate (LOPRESSOR) tablet 25 mg  25 mg Oral BID Favian Mcdaniel MD        naloxone 0.4 mg/mL injection 0.02 mg  0.02 mg Intravenous PRN Favian Mcdaniel MD        ondansetron injection 4 mg  4 mg Intravenous Q8H PRN Favian Mcdaniel MD        pantoprazole EC tablet 40 mg  40 mg Oral Daily Favian Mcdaniel MD        polyethylene glycol packet 17 g  17 g Oral Daily Favian Mcdaniel MD        potassium bicarbonate disintegrating tablet 35 mEq  35 mEq Oral PRN Favian Mcdaniel MD        potassium bicarbonate disintegrating tablet 50 mEq  50 mEq Oral PRN Favian Mcdaniel MD        potassium bicarbonate disintegrating tablet 60 mEq  60 mEq Oral PRN Favian Mcdaniel MD        potassium, sodium phosphates 280-160-250 mg packet 2 packet  2 packet Oral PRN Favian Mcdaniel MD        potassium, sodium phosphates 280-160-250 mg packet 2 packet  2 packet Oral PRN Favian Mcdaniel MD        potassium, sodium phosphates 280-160-250 mg packet 2 packet  2 packet Oral PRN Favian Mcdaniel MD        prochlorperazine injection Soln 5 mg  5 mg  Intravenous Q6H PRN Favian Mcdaniel MD        QUEtiapine tablet 200 mg  200 mg Oral Before breakfast Favian Mcdaniel MD        risperiDONE tablet 3 mg  3 mg Oral BID Favian Mcdaniel MD        simethicone chewable tablet 80 mg  1 tablet Oral QID PRN Favian Mcdaniel MD        sodium chloride 0.9% flush 10 mL  10 mL Intravenous Q12H PRN Favian Mcdaniel MD        vancomycin (VANCOCIN) 1,750 mg in dextrose 5 % (D5W) 500 mL IVPB  1,750 mg Intravenous Q12H Reggie Dick MD        vancomycin - pharmacy to dose   Intravenous pharmacy to manage frequency Favian Mcdaniel MD         Family History       Problem Relation (Age of Onset)    Cataracts Father    Diabetes Father, Paternal Grandfather    Heart disease Father, Paternal Grandfather    Hypertension Father    No Known Problems Mother, Sister, Brother, Maternal Aunt, Maternal Uncle, Paternal Aunt, Paternal Uncle, Maternal Grandfather    Ovarian cancer Maternal Grandmother, Paternal Grandmother          Tobacco Use    Smoking status: Former     Current packs/day: 0.00     Average packs/day: 0.5 packs/day for 37.0 years (18.5 ttl pk-yrs)     Types: Cigarettes     Start date: 12/6/1983     Quit date: 12/6/2020     Years since quitting: 3.4    Smokeless tobacco: Current    Tobacco comments:     Enrolled in the [employer] on 5/3/14 ([facility] Member ID # [id number]). Ambulatory referral to Smoking Cessation Program   Substance and Sexual Activity    Alcohol use: No     Alcohol/week: 0.0 standard drinks of alcohol    Drug use: No    Sexual activity: Yes     Partners: Male     Review of Systems   Constitutional: Negative.    HENT: Negative.     Eyes: Negative.    Respiratory: Negative.     Cardiovascular: Negative.    Gastrointestinal: Negative.    Endocrine: Negative.    Genitourinary: Negative.    Musculoskeletal: Negative.    Skin:  Positive for rash.   Allergic/Immunologic: Negative.    Neurological:  Positive for headaches.   Psychiatric/Behavioral: Negative.       Objective:      Vital Signs (Most Recent):  Temp: 97.7 °F (36.5 °C) (05/01/24 0247)  Pulse: 75 (05/01/24 0247)  Resp: 16 (05/01/24 0247)  BP: (!) 125/59 (05/01/24 0247)  SpO2: (!) 93 % (05/01/24 0247) Vital Signs (24h Range):  Temp:  [97.7 °F (36.5 °C)-98.7 °F (37.1 °C)] 97.7 °F (36.5 °C)  Pulse:  [75-98] 75  Resp:  [16-20] 16  SpO2:  [91 %-97 %] 93 %  BP: (101-145)/(46-68) 125/59     Weight: 104.3 kg (230 lb)  Body mass index is 37.12 kg/m².     Physical Exam  Vitals and nursing note reviewed.   Constitutional:       General: She is not in acute distress.     Appearance: Normal appearance. She is obese. She is not ill-appearing.   HENT:      Head: Normocephalic and atraumatic.      Nose: Nose normal.      Mouth/Throat:      Mouth: Mucous membranes are moist.   Eyes:      Extraocular Movements: Extraocular movements intact.   Cardiovascular:      Rate and Rhythm: Normal rate.      Pulses: Normal pulses.      Heart sounds: No murmur heard.  Pulmonary:      Effort: Pulmonary effort is normal. No respiratory distress.   Abdominal:      General: Abdomen is flat.      Palpations: Abdomen is soft.      Tenderness: There is no abdominal tenderness.   Musculoskeletal:      Right lower leg: No edema.      Comments: Left BKA   Skin:     General: Skin is warm.      Capillary Refill: Capillary refill takes less than 2 seconds.      Findings: Rash present. Rash is nodular and papular.      Comments: Present on her scalp, face, upper back bilaterally, and chest.    Neurological:      General: No focal deficit present.      Mental Status: She is alert.   Psychiatric:         Mood and Affect: Mood normal.                Significant Labs: All pertinent labs within the past 24 hours have been reviewed.    Significant Imaging: I have reviewed all pertinent imaging results/findings within the past 24 hours.  Assessment/Plan:     * Facial cellulitis  Presents with facial/scalp, back and chest rash after an insect bite  Multiple sites with localized  erythema. Concerning for developing cellulitis.   CT head showed no acute process   Continue vancomycin       PAD (peripheral artery disease)  History noted.       Chronic deep vein thrombosis (DVT) of both lower extremities  Continue Eliquis       SHAWN (obstructive sleep apnea)  Not on CPAP. Outpatient follow up with sleep medicine     History of pulmonary embolism  Continue Eliquis       Class 2 severe obesity with serious comorbidity and body mass index (BMI) of 37.0 to 37.9 in adult  Body mass index is 37.12 kg/m². Morbid obesity complicates all aspects of disease management from diagnostic modalities to treatment. Weight loss encouraged and health benefits explained to patient.         Gastroparesis due to DM  History noted.     Bipolar 1 disorder  Continue home medications     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  Glycemic protocol. Sliding scale. Continue home lantus.       Hyperlipidemia  Continue statin       COPD (chronic obstructive pulmonary disease)  Patient's COPD is controlled currently.  Patient is currently off COPD Pathway.     Essential hypertension  Chronic, controlled. Latest blood pressure and vitals reviewed-     Temp:  [98.7 °F (37.1 °C)]   Pulse:  [89-98]   Resp:  [20]   BP: (112-145)/(58-68)   SpO2:  [91 %-97 %] .   Home meds for hypertension were reviewed and noted below.   Hypertension Medications               bumetanide (BUMEX) 1 MG tablet Take 1 tablet (1 mg total) by mouth once daily.    lisinopriL 10 MG tablet Take 1 tablet (10 mg total) by mouth once daily.    metoprolol tartrate (LOPRESSOR) 25 MG tablet Take 1 tablet (25 mg total) by mouth 2 (two) times daily.            While in the hospital, will manage blood pressure as follows; Continue home antihypertensive regimen    Will utilize p.r.n. blood pressure medication only if patient's blood pressure greater than 180/110 and she develops symptoms such as worsening chest pain or shortness of breath.      VTE Risk  "Mitigation (From admission, onward)           Ordered     apixaban tablet 5 mg  2 times daily         05/01/24 0311     IP VTE HIGH RISK PATIENT  Once         05/01/24 0256     Place sequential compression device  Until discontinued         05/01/24 0256                               Pharmacokinetic Initial Assessment: IV Vancomycin    Assessment/Plan:    Initiate intravenous vancomycin with loading dose of 2000 mg once followed by a maintenance dose of vancomycin 1750 mg IV every 12 hours  Desired empiric serum trough concentration is 10 to 20 mcg/mL  Draw vancomycin trough level 60 min prior to fourth dose on 5/2/24 at approximately 0900  Pharmacy will continue to follow and monitor vancomycin.      Please contact pharmacy at extension [phone] with any questions regarding this assessment.     Thank you for the consult,   Graham Anderson       Patient brief summary:  Audrey Natarajan is a 53 y.o. female initiated on antimicrobial therapy with IV Vancomycin for treatment of suspected skin & soft tissue infection    Drug Allergies:   Review of patient's allergies indicates:   Allergen Reactions    Morphine Other (See Comments)     Patient had a psychotic episode after taking Morphine  Agitation, hallucinations  Other Reaction(s): Other (See Comments), Other (See Comments)    Patient had a psychotic episode after taking Morphine    Patient had a psychotic episode after taking Morphine Agitation, hallucinations    Penicillins Anaphylaxis     Tolerated cephalosporins in the past    Januvia [sitagliptin] Hives    Carbamazepine Other (See Comments)     hyponatremia  Other Reaction(s): Other (See Comments)    hyponatremia       Actual Body Weight:   104.3 kg    Renal Function:   CrCl cannot be calculated (Patient's most recent lab result is older than the maximum 7 days allowed.).,     Dialysis Method (if applicable):  N/A    CBC (last 72 hours):  No results for input(s): "WHITE BLOOD CELL COUNT", "HEMOGLOBIN", " ""HEMATOCRIT", "PLATELETS", "GRAN%", "LYMPH%", "MONO%", "EOSINOPHIL%", "BASOPHIL%", "DIFFERENTIAL METHOD" in the last 72 hours.    Metabolic Panel (last 72 hours):  No results for input(s): "SODIUM", "POTASSIUM", "CHLORIDE", "CO2", "GLUCOSE", "BUN BLD", "CREATININE", "ALBUMIN", "BILIRUBIN TOTAL", "ALK PHOS", "AST", "ALT", "MAGNESIUM", "PHOSPHORUS" in the last 72 hours.    Drug levels (last 3 results):  No results for input(s): "VANCOMYCINRA", "VANCORANDOM", "VANCOMYCINPE", "VANCOPEAK", "VANCOMYCINTR", "VANCOTROUGH" in the last 72 hours.    Microbiologic Results:  Microbiology Results (last 7 days)       Procedure Component Value Units Date/Time    Blood culture #1 **CANNOT BE ORDERED STAT** [[id number]] Collected: 04/30/24 2127    Order Status: Sent Specimen: Blood from Peripheral, Forearm, Left Updated: 04/30/24 2342    Blood culture #2 **CANNOT BE ORDERED STAT** [[id number]] Collected: 04/30/24 2136    Order Status: Sent Specimen: Blood from Peripheral, Antecubital, Right Updated: 04/30/24 2342              AdmissionCare    Guideline: Cellulitis - INPT, Inpatient    Based on the indications selected for the patient, the bed status of Inpatient was determined to be MET    The following indications were selected as present at the time of evaluation of the patient:      - Clinical presentation (eg, acuity of infection, rapidity of progression) or treatment regimen is judged to require intensity of patient monitoring (eg, vital sign measurement, checks for infection progression, laboratory testing) that cannot be provided at other than inpatient level of care.    AdmissionCare documentation entered by: Lorenzo Chiang    [facility], 27th edition, Copyright © 2023 [facility], Property [employer] All Rights Reserved.  [id number]    Favian Mcdaniel MD  Department of Hospital Medicine  [facility] [place] - Med Surg          "

## 2024-05-01 NOTE — PROVIDER PROGRESS NOTES - EMERGENCY DEPT.
Encounter Date: 4/30/2024    ED Physician Progress Notes            ED Course as of 04/30/24 2019   Tue Apr 30, 2024   1809 POCT CMP(!)  Anion gap 13    [CC]   1811 POCT CBC  WBC 23.3, hemoglobin 14.9, hematocrit 44, platelet count 445 [CC]   1844 CT Head Without Contrast  Impression:     No acute intracranial abnormalities identified.      [CC]   1845 X-Ray Chest PA And Lateral  Impression:     No acute cardiopulmonary process identified.         [CC]   1933 53-year-old female presenting to the emergency department for evaluation head pain. Exam is concerning for cellulitis of the scalp. She has a history of this. Appears to be potentially staph infection. She was significantly hyperglycemic in the emergency department. She has ketonuria. Anion gap is only 13. Bicarb is 31. PH 7.434. I do not believe this DKA. She was white count of 23.3. She meets SIRS criteria. I am concern for sepsis. Lactic here is 3.01 after a L of fluid. Liver function kidney function within normal limits. I believe she would benefit from IV antibiotics and admission versus observation. [CC]   2018 I spoke with hospital medicine for admission they have accepted. [CC]   2018 On my re-evaluation of the patient's she was sitting there comfortably.  She was not tachycardic.  Vitals are stable.  Lungs are clear to auscultation bilaterally.  Abdomen is soft nontender with normoactive bowel sounds.  She was not altered.  No meningismus. [CC]   2018 CT of the head negative for any acute abnormalities.  Chest x-ray without evidence of pneumonia, pneumothorax.  UA isn't indicative of UTI. [CC]      ED Course User Index  [CC] Pio Briceno MD

## 2024-05-01 NOTE — PROGRESS NOTES
Awaiting Vanc tr lab draw and result, once timed and due 5-2-24 at 0900.  Pt's scr is stable (0.8)  Will continue to monitor.  Thank you for the consult.

## 2024-05-01 NOTE — SUBJECTIVE & OBJECTIVE
"Past Medical History:   Diagnosis Date    ADHD (attention deficit hyperactivity disorder)     Arthritis     Asthma     Bipolar 1 disorder     Cataract     Cigarette smoker     COPD (chronic obstructive pulmonary disease)     Coronary artery disease     A fib    Depression     bipolar manic depresson    Diabetes mellitus     Diabetic foot ulcers     Diabetic neuropathy     DVT of lower extremity, bilateral 07/2013    bilateral LE DVT. [place] filter placed.     Encounter for blood transfusion     History of blood clots 1. Left Leg=2003; 2.Bilateral Groin=Blood Clots= 5 or 6/ 2013 & 7/2013; 3. LLL of Lung=7/2013;  4. Lt. Lower Leg=7/2013.     Pt. had 1st Blood Clot after Ejeuutccxzjr=7570, & Last=2013. [place] Filter= Rt.Lateral Neck.    HTN (hypertension) 06/06/2013    Pt states that she does not have hypertension    Hypercholesteremia     Irregular heartbeat     Neuromuscular disorder     neuropathy feet    Obese     PE (pulmonary embolism) 07/2013    bilat LE DVT.     Restless leg syndrome        Past Surgical History:   Procedure Laterality Date    ABDOMINAL SURGERY  2010    gastric sleeve    BELOW KNEE AMPUTATION OF LOWER EXTREMITY Left 4/19/2023    Procedure: AMPUTATION, BELOW KNEE;  Surgeon: Gabe Munoz MD;  Location: [facility] OR;  Service: General;  Laterality: Left;  RN PREOP 4/11/2023    BILATERAL OOPHORECTOMY Bilateral 1/12/2015    CHOLECYSTECTOMY      DEBRIDEMENT OF FOOT Bilateral 5/10/2022    Procedure: DEBRIDEMENT, FOOT;  Surgeon: Maira De Los Santos DPM;  Location: [facility] OR;  Service: Podiatry;  Laterality: Bilateral;    DEBRIDEMENT OF FOOT Left 2/28/2023    Procedure: DEBRIDEMENT, FOOT,biopsy;  Surgeon: Maira De Los Santos DPM;  Location: [facility] OR;  Service: Podiatry;  Laterality: Left;  request misonix, wound VAC, possible neoxx    Green' s filter Right 7/4/2012    Right Neck & Tunneled Down.    HERNIA REPAIR      "[place] of Hernias Repaires around th [place].", pt. states    INCISION AND DRAINAGE " FOOT Left 12/24/2022    Procedure: INCISION AND DRAINAGE, FOOT;  Surgeon: Fahad Razo DPM;  Location: [facility] OR;  Service: Podiatry;  Laterality: Left;    LAPAROSCOPIC CHOLECYSTECTOMY N/A 9/10/2020    Procedure: CHOLECYSTECTOMY, LAPAROSCOPIC;  Surgeon: Montrell Gutierrez MD;  Location: [facility] OR;  Service: General;  Laterality: N/A;  RN PREOP 9/9----COVID Negative  9/9    OVARIAN CYST REMOVAL  3/13/2014    [place] REMOVAL OF OVARY/TUBE(S)      SPLENECTOMY, TOTAL  July 2003    TONSILLECTOMY      as a child    TYMPANOSTOMY TUBE PLACEMENT  1976    VEIN SURGERY  2003    Lt leg       Review of patient's allergies indicates:   Allergen Reactions    Morphine Other (See Comments)     Patient had a psychotic episode after taking Morphine  Agitation, hallucinations  Other Reaction(s): Other (See Comments), Other (See Comments)    Patient had a psychotic episode after taking Morphine    Patient had a psychotic episode after taking Morphine Agitation, hallucinations    Penicillins Anaphylaxis     Tolerated cephalosporins in the past    Januvia [sitagliptin] Hives    Carbamazepine Other (See Comments)     hyponatremia  Other Reaction(s): Other (See Comments)    hyponatremia       Current Facility-Administered Medications   Medication Dose Route Frequency Provider Last Rate Last Admin    acetaminophen tablet 650 mg  650 mg Oral Q8H PRN Favian Mcdaniel MD        acetaminophen tablet 650 mg  650 mg Oral Q4H PRN Favian Mcdaniel MD        albuterol-ipratropium 2.5 mg-0.5 mg/3 mL nebulizer solution 3 mL  3 mL Nebulization Q4H PRN Favian Mcdaniel MD        aluminum-magnesium hydroxide-simethicone 200-200-20 mg/5 mL suspension 30 mL  30 mL Oral QID PRN Favian Mcdaniel MD        apixaban tablet 5 mg  5 mg Oral BID Favian Mcdaniel MD        aspirin chewable tablet 81 mg  81 mg Oral Daily Favian Mcdaniel MD        atorvastatin tablet 40 mg  40 mg Oral Daily Favian Mcdaniel MD        bisacodyL suppository 10 mg  10 mg Rectal Daily PRN Favian Mcdaniel MD         bumetanide tablet 1 mg  1 mg Oral Daily Favian Mcdaniel MD        dextrose 10% bolus 125 mL 125 mL  12.5 g Intravenous PRN Favian Mcdaniel MD        dextrose 10% bolus 250 mL 250 mL  25 g Intravenous PRN Favian Mcdaniel MD        divalproex EC tablet 500 mg  500 mg Oral QHS Favian Mcdaniel MD        gabapentin capsule 600 mg  600 mg Oral TID Favian Mcdaniel MD        glucagon (human recombinant) injection 1 mg  1 mg Intramuscular PRN Favian Mcdaniel MD        glucose chewable tablet 16 g  16 g Oral PRN Favian Mcdaniel MD        glucose chewable tablet 24 g  24 g Oral PRN Favian Mcdaniel MD        HYDROcodone-acetaminophen 5-325 mg per tablet 1 tablet  1 tablet Oral Q6H PRN Favian Mcdaniel MD        insulin aspart U-100 pen 0-5 Units  0-5 Units Subcutaneous QID (AC + HS) PRN Favian Mcdaniel MD        insulin aspart U-100 pen 5 Units  5 Units Subcutaneous TID Favian Mcdaniel MD        insulin detemir U-100 (Levemir) pen 15 Units  15 Units Subcutaneous BID Favian Mcdaniel MD        lisinopriL tablet 10 mg  10 mg Oral Daily Favian Mcdaniel MD        magnesium oxide tablet 800 mg  800 mg Oral PRN Favian Mcdaniel MD        magnesium oxide tablet 800 mg  800 mg Oral PRN Favian Mcdaniel MD        melatonin tablet 6 mg  6 mg Oral Nightly PRN Favian Mcdaniel MD        metoprolol tartrate (LOPRESSOR) tablet 25 mg  25 mg Oral BID Favian Mcdaniel MD        naloxone 0.4 mg/mL injection 0.02 mg  0.02 mg Intravenous PRN Favian Mcdaniel MD        ondansetron injection 4 mg  4 mg Intravenous Q8H PRN Favian Mcdaniel MD        pantoprazole EC tablet 40 mg  40 mg Oral Daily Favian Mcdaniel MD        polyethylene glycol packet 17 g  17 g Oral Daily Favian Mcdaniel MD        potassium bicarbonate disintegrating tablet 35 mEq  35 mEq Oral PRN Favian Mcdaniel MD        potassium bicarbonate disintegrating tablet 50 mEq  50 mEq Oral PRN Favian Mcdaniel MD        potassium bicarbonate disintegrating tablet 60 mEq  60 mEq Oral PRN Favian Mcdaniel MD        potassium, sodium phosphates  280-160-250 mg packet 2 packet  2 packet Oral PRN Favian Mcdaniel MD        potassium, sodium phosphates 280-160-250 mg packet 2 packet  2 packet Oral PRN Favian Mcdaniel MD        potassium, sodium phosphates 280-160-250 mg packet 2 packet  2 packet Oral PRN Favian Mcdaniel MD        prochlorperazine injection Soln 5 mg  5 mg Intravenous Q6H PRN Favian Mcdaniel MD        QUEtiapine tablet 200 mg  200 mg Oral Before breakfast Favian Mcdaniel MD        risperiDONE tablet 3 mg  3 mg Oral BID Favian Mcdaniel MD        simethicone chewable tablet 80 mg  1 tablet Oral QID PRN Favian Mcdaniel MD        sodium chloride 0.9% flush 10 mL  10 mL Intravenous Q12H PRN Favian Mcdaniel MD        vancomycin (VANCOCIN) 1,750 mg in dextrose 5 % (D5W) 500 mL IVPB  1,750 mg Intravenous Q12H Reggie Dick MD        vancomycin - pharmacy to dose   Intravenous pharmacy to manage frequency Favian Mcdaniel MD         Family History       Problem Relation (Age of Onset)    Cataracts Father    Diabetes Father, Paternal Grandfather    Heart disease Father, Paternal Grandfather    Hypertension Father    No Known Problems Mother, Sister, Brother, Maternal Aunt, Maternal Uncle, Paternal Aunt, Paternal Uncle, Maternal Grandfather    Ovarian cancer Maternal Grandmother, Paternal Grandmother          Tobacco Use    Smoking status: Former     Current packs/day: 0.00     Average packs/day: 0.5 packs/day for 37.0 years (18.5 ttl pk-yrs)     Types: Cigarettes     Start date: 12/6/1983     Quit date: 12/6/2020     Years since quitting: 3.4    Smokeless tobacco: Current    Tobacco comments:     Enrolled in the [employer] on 5/3/14 ([facility] Member ID # [id number]). Ambulatory referral to Smoking Cessation Program   Substance and Sexual Activity    Alcohol use: No     Alcohol/week: 0.0 standard drinks of alcohol    Drug use: No    Sexual activity: Yes     Partners: Male     Review of Systems   Constitutional: Negative.    HENT: Negative.     Eyes: Negative.     Respiratory: Negative.     Cardiovascular: Negative.    Gastrointestinal: Negative.    Endocrine: Negative.    Genitourinary: Negative.    Musculoskeletal: Negative.    Skin:  Positive for rash.   Allergic/Immunologic: Negative.    Neurological:  Positive for headaches.   Psychiatric/Behavioral: Negative.       Objective:     Vital Signs (Most Recent):  Temp: 97.7 °F (36.5 °C) (05/01/24 0247)  Pulse: 75 (05/01/24 0247)  Resp: 16 (05/01/24 0247)  BP: (!) 125/59 (05/01/24 0247)  SpO2: (!) 93 % (05/01/24 0247) Vital Signs (24h Range):  Temp:  [97.7 °F (36.5 °C)-98.7 °F (37.1 °C)] 97.7 °F (36.5 °C)  Pulse:  [75-98] 75  Resp:  [16-20] 16  SpO2:  [91 %-97 %] 93 %  BP: (101-145)/(46-68) 125/59     Weight: 104.3 kg (230 lb)  Body mass index is 37.12 kg/m².     Physical Exam  Vitals and nursing note reviewed.   Constitutional:       General: She is not in acute distress.     Appearance: Normal appearance. She is obese. She is not ill-appearing.   HENT:      Head: Normocephalic and atraumatic.      Nose: Nose normal.      Mouth/Throat:      Mouth: Mucous membranes are moist.   Eyes:      Extraocular Movements: Extraocular movements intact.   Cardiovascular:      Rate and Rhythm: Normal rate.      Pulses: Normal pulses.      Heart sounds: No murmur heard.  Pulmonary:      Effort: Pulmonary effort is normal. No respiratory distress.   Abdominal:      General: Abdomen is flat.      Palpations: Abdomen is soft.      Tenderness: There is no abdominal tenderness.   Musculoskeletal:      Right lower leg: No edema.      Comments: Left BKA   Skin:     General: Skin is warm.      Capillary Refill: Capillary refill takes less than 2 seconds.      Findings: Rash present. Rash is nodular and papular.      Comments: Present on her scalp, face, upper back bilaterally, and chest.    Neurological:      General: No focal deficit present.      Mental Status: She is alert.   Psychiatric:         Mood and Affect: Mood normal.                 Significant Labs: All pertinent labs within the past 24 hours have been reviewed.    Significant Imaging: I have reviewed all pertinent imaging results/findings within the past 24 hours.

## 2024-05-01 NOTE — ADMISSIONCARE
AdmissionCare    Guideline: Cellulitis - INPT, Inpatient    Based on the indications selected for the patient, the bed status of Inpatient was determined to be MET    The following indications were selected as present at the time of evaluation of the patient:      - Clinical presentation (eg, acuity of infection, rapidity of progression) or treatment regimen is judged to require intensity of patient monitoring (eg, vital sign measurement, checks for infection progression, laboratory testing) that cannot be provided at other than inpatient level of care.    AdmissionCare documentation entered by: Lorenzo Chiang    [facility], 27th edition, Copyright © 2023 [email or web address], [facility] All Rights Reserved.  [id number]:00

## 2024-05-02 PROBLEM — R78.81 BACTEREMIA: Status: ACTIVE | Noted: 2024-05-02

## 2024-05-02 PROBLEM — E83.42 HYPOMAGNESEMIA: Status: ACTIVE | Noted: 2024-05-02

## 2024-05-02 LAB
ACINETOBACTER CALCOACETICUS/BAUMANNII COMPLEX: NOT DETECTED
ANION GAP SERPL CALC-SCNC: 8 MMOL/L (ref 8–16)
ASCENDING AORTA: 2.97 CM
AV INDEX (PROSTH): 0.72
AV MEAN GRADIENT: 9 MMHG
AV PEAK GRADIENT: 16 MMHG
AV VALVE AREA BY VELOCITY RATIO: 2.28 CM²
AV VALVE AREA: 2.5 CM²
AV VELOCITY RATIO: 0.66
BACTEROIDES FRAGILIS: NOT DETECTED
BASOPHILS # BLD AUTO: 0.1 K/UL (ref 0–0.2)
BASOPHILS NFR BLD: 0.9 % (ref 0–1.9)
BSA FOR ECHO PROCEDURE: 2.28 M2
BUN SERPL-MCNC: 16 MG/DL (ref 6–20)
CALCIUM SERPL-MCNC: 9.1 MG/DL (ref 8.7–10.5)
CANDIDA ALBICANS: NOT DETECTED
CANDIDA AURIS: NOT DETECTED
CANDIDA GLABRATA: NOT DETECTED
CANDIDA KRUSEI: NOT DETECTED
CANDIDA PARAPSILOSIS: NOT DETECTED
CANDIDA TROPICALIS: NOT DETECTED
CHLORIDE SERPL-SCNC: 97 MMOL/L (ref 95–110)
CO2 SERPL-SCNC: 32 MMOL/L (ref 23–29)
CREAT SERPL-MCNC: 0.8 MG/DL (ref 0.5–1.4)
CRYPTOCOCCUS NEOFORMANS/GATTII: NOT DETECTED
CTX-M GENE (ESBL PRODUCER): NORMAL
CV ECHO LV RWT: 0.38 CM
DIFFERENTIAL METHOD BLD: ABNORMAL
DOP CALC AO PEAK VEL: 1.97 M/S
DOP CALC AO VTI: 38.9 CM
DOP CALC LVOT AREA: 3.5 CM2
DOP CALC LVOT DIAMETER: 2.1 CM
DOP CALC LVOT PEAK VEL: 1.3 M/S
DOP CALC LVOT STROKE VOLUME: 97.28 CM3
DOP CALCLVOT PEAK VEL VTI: 28.1 CM
E WAVE DECELERATION TIME: 218.89 MSEC
E/A RATIO: 1.36
E/E' RATIO: 9.75 M/S
ECHO LV POSTERIOR WALL: 0.93 CM (ref 0.6–1.1)
ENTEROBACTER CLOACAE COMPLEX: NOT DETECTED
ENTEROBACTERALES: NOT DETECTED
ENTEROCOCCUS FAECALIS: NOT DETECTED
ENTEROCOCCUS FAECIUM: NOT DETECTED
EOSINOPHIL # BLD AUTO: 0.6 K/UL (ref 0–0.5)
EOSINOPHIL NFR BLD: 4.8 % (ref 0–8)
ERYTHROCYTE [DISTWIDTH] IN BLOOD BY AUTOMATED COUNT: 14.4 % (ref 11.5–14.5)
ESCHERICHIA COLI: NOT DETECTED
EST. GFR  (NO RACE VARIABLE): >60 ML/MIN/1.73 M^2
FRACTIONAL SHORTENING: 42 % (ref 28–44)
GLUCOSE SERPL-MCNC: 318 MG/DL (ref 70–110)
HAEMOPHILUS INFLUENZAE: NOT DETECTED
HCT VFR BLD AUTO: 39.3 % (ref 37–48.5)
HGB BLD-MCNC: 12.4 G/DL (ref 12–16)
IMM GRANULOCYTES # BLD AUTO: 0.09 K/UL (ref 0–0.04)
IMM GRANULOCYTES NFR BLD AUTO: 0.8 % (ref 0–0.5)
IMP GENE (CARBAPENEM RESISTANT): NORMAL
INTERVENTRICULAR SEPTUM: 0.83 CM (ref 0.6–1.1)
IVC DIAMETER: 2.07 CM
IVRT: 62.8 MSEC
KLEBSIELLA AEROGENES: NOT DETECTED
KLEBSIELLA OXYTOCA: NOT DETECTED
KLEBSIELLA PNEUMONIAE GROUP: NOT DETECTED
KPC RESISTANCE GENE (CARBAPENEM): NORMAL
LA MAJOR: 6.18 CM
LA MINOR: 4.16 CM
LA WIDTH: 3.2 CM
LEFT ATRIUM SIZE: 4.55 CM
LEFT ATRIUM VOLUME INDEX: 28.1 ML/M2
LEFT ATRIUM VOLUME: 61.54 CM3
LEFT INTERNAL DIMENSION IN SYSTOLE: 2.84 CM (ref 2.1–4)
LEFT VENTRICLE DIASTOLIC VOLUME INDEX: 52.07 ML/M2
LEFT VENTRICLE DIASTOLIC VOLUME: 114.04 ML
LEFT VENTRICLE MASS INDEX: 68 G/M2
LEFT VENTRICLE SYSTOLIC VOLUME INDEX: 14 ML/M2
LEFT VENTRICLE SYSTOLIC VOLUME: 30.7 ML
LEFT VENTRICULAR INTERNAL DIMENSION IN DIASTOLE: 4.92 CM (ref 3.5–6)
LEFT VENTRICULAR MASS: 149.51 G
LISTERIA MONOCYTOGENES: NOT DETECTED
LV LATERAL E/E' RATIO: 9.75 M/S
LV SEPTAL E/E' RATIO: 9.75 M/S
LVOT MG: 3.73 MMHG
LVOT MV: 0.9 CM/S
LYMPHOCYTES # BLD AUTO: 4.5 K/UL (ref 1–4.8)
LYMPHOCYTES NFR BLD: 39 % (ref 18–48)
MAGNESIUM SERPL-MCNC: 1.7 MG/DL (ref 1.6–2.6)
MCH RBC QN AUTO: 28.2 PG (ref 27–31)
MCHC RBC AUTO-ENTMCNC: 31.6 G/DL (ref 32–36)
MCR-1: NORMAL
MCV RBC AUTO: 89 FL (ref 82–98)
MEC A/C AND MREJ (MRSA): NORMAL
MEC A/C: NORMAL
MONOCYTES # BLD AUTO: 1.4 K/UL (ref 0.3–1)
MONOCYTES NFR BLD: 12 % (ref 4–15)
MV PEAK A VEL: 0.86 M/S
MV PEAK E VEL: 1.17 M/S
MV STENOSIS PRESSURE HALF TIME: 63.48 MS
MV VALVE AREA P 1/2 METHOD: 3.47 CM2
NDM GENE (CARBAPENEM RESISTANT): NORMAL
NEISSERIA MENINGITIDIS: NOT DETECTED
NEUTROPHILS # BLD AUTO: 4.9 K/UL (ref 1.8–7.7)
NEUTROPHILS NFR BLD: 42.5 % (ref 38–73)
NRBC BLD-RTO: 0 /100 WBC
OHS CV RV/LV RATIO: 0.77 CM
OXA-48-LIKE (CARBAPENEM RESISTANT): NORMAL
PHOSPHATE SERPL-MCNC: 5 MG/DL (ref 2.7–4.5)
PISA TR MAX VEL: 2.14 M/S
PLATELET # BLD AUTO: 397 K/UL (ref 150–450)
PMV BLD AUTO: 11.2 FL (ref 9.2–12.9)
POCT GLUCOSE: 276 MG/DL (ref 70–110)
POCT GLUCOSE: 296 MG/DL (ref 70–110)
POCT GLUCOSE: 377 MG/DL (ref 70–110)
POCT GLUCOSE: 393 MG/DL (ref 70–110)
POTASSIUM SERPL-SCNC: 4.8 MMOL/L (ref 3.5–5.1)
PROTEUS SPECIES: NOT DETECTED
PSEUDOMONAS AERUGINOSA: NOT DETECTED
PULM VEIN S/D RATIO: 1.22
PV PEAK D VEL: 0.51 M/S
PV PEAK GRADIENT: 6 MMHG
PV PEAK S VEL: 0.62 M/S
PV PEAK VELOCITY: 1.19 M/S
RA MAJOR: 5.38 CM
RA PRESSURE ESTIMATED: 3 MMHG
RA WIDTH: 3.6 CM
RBC # BLD AUTO: 4.4 M/UL (ref 4–5.4)
RIGHT VENTRICULAR END-DIASTOLIC DIMENSION: 3.8 CM
RV TB RVSP: 5 MMHG
RV TISSUE DOPPLER FREE WALL SYSTOLIC VELOCITY 1 (APICAL 4 CHAMBER VIEW): 17.58 CM/S
SALMONELLA SP: NOT DETECTED
SERRATIA MARCESCENS: NOT DETECTED
SINUS: 2.99 CM
SODIUM SERPL-SCNC: 137 MMOL/L (ref 136–145)
STAPHYLOCOCCUS AUREUS: NOT DETECTED
STAPHYLOCOCCUS EPIDERMIDIS: NOT DETECTED
STAPHYLOCOCCUS LUGDUNESIS: NOT DETECTED
STAPHYLOCOCCUS SPECIES: NOT DETECTED
STENOTROPHOMONAS MALTOPHILIA: NOT DETECTED
STJ: 2.59 CM
STREPTOCOCCUS AGALACTIAE: NOT DETECTED
STREPTOCOCCUS PNEUMONIAE: NOT DETECTED
STREPTOCOCCUS PYOGENES: NOT DETECTED
STREPTOCOCCUS SPECIES: NOT DETECTED
TDI LATERAL: 0.12 M/S
TDI SEPTAL: 0.12 M/S
TDI: 0.12 M/S
TR MAX PG: 18 MMHG
TRICUSPID ANNULAR PLANE SYSTOLIC EXCURSION: 2.7 CM
TV REST PULMONARY ARTERY PRESSURE: 21 MMHG
VAN A/B (VRE GENE): NORMAL
VANCOMYCIN TROUGH SERPL-MCNC: 17.1 UG/ML (ref 10–22)
VIM GENE (CARBAPENEM RESISTANT): NORMAL
WBC # BLD AUTO: 11.5 K/UL (ref 3.9–12.7)
Z-SCORE OF LEFT VENTRICULAR DIMENSION IN END DIASTOLE: -4.08
Z-SCORE OF LEFT VENTRICULAR DIMENSION IN END SYSTOLE: -3.63

## 2024-05-02 PROCEDURE — 63600175 PHARM REV CODE 636 W HCPCS: Performed by: STUDENT IN AN ORGANIZED HEALTH CARE EDUCATION/TRAINING PROGRAM

## 2024-05-02 PROCEDURE — A4216 STERILE WATER/SALINE, 10 ML: HCPCS | Performed by: STUDENT IN AN ORGANIZED HEALTH CARE EDUCATION/TRAINING PROGRAM

## 2024-05-02 PROCEDURE — 25000003 PHARM REV CODE 250

## 2024-05-02 PROCEDURE — 25000003 PHARM REV CODE 250: Performed by: STUDENT IN AN ORGANIZED HEALTH CARE EDUCATION/TRAINING PROGRAM

## 2024-05-02 PROCEDURE — 25000003 PHARM REV CODE 250: Performed by: PHYSICIAN ASSISTANT

## 2024-05-02 PROCEDURE — 83735 ASSAY OF MAGNESIUM: CPT | Performed by: STUDENT IN AN ORGANIZED HEALTH CARE EDUCATION/TRAINING PROGRAM

## 2024-05-02 PROCEDURE — 11000001 HC ACUTE MED/SURG PRIVATE ROOM

## 2024-05-02 PROCEDURE — 36415 COLL VENOUS BLD VENIPUNCTURE: CPT | Performed by: STUDENT IN AN ORGANIZED HEALTH CARE EDUCATION/TRAINING PROGRAM

## 2024-05-02 PROCEDURE — 80048 BASIC METABOLIC PNL TOTAL CA: CPT | Performed by: STUDENT IN AN ORGANIZED HEALTH CARE EDUCATION/TRAINING PROGRAM

## 2024-05-02 PROCEDURE — 84100 ASSAY OF PHOSPHORUS: CPT | Performed by: STUDENT IN AN ORGANIZED HEALTH CARE EDUCATION/TRAINING PROGRAM

## 2024-05-02 PROCEDURE — 80202 ASSAY OF VANCOMYCIN: CPT | Performed by: STUDENT IN AN ORGANIZED HEALTH CARE EDUCATION/TRAINING PROGRAM

## 2024-05-02 PROCEDURE — 85025 COMPLETE CBC W/AUTO DIFF WBC: CPT | Performed by: STUDENT IN AN ORGANIZED HEALTH CARE EDUCATION/TRAINING PROGRAM

## 2024-05-02 RX ORDER — LANOLIN ALCOHOL/MO/W.PET/CERES
400 CREAM (GRAM) TOPICAL ONCE
Status: COMPLETED | OUTPATIENT
Start: 2024-05-02 | End: 2024-05-02

## 2024-05-02 RX ORDER — METHOCARBAMOL 500 MG/1
500 TABLET, FILM COATED ORAL 3 TIMES DAILY
Status: DISCONTINUED | OUTPATIENT
Start: 2024-05-02 | End: 2024-05-04 | Stop reason: HOSPADM

## 2024-05-02 RX ORDER — INSULIN ASPART 100 [IU]/ML
10 INJECTION, SOLUTION INTRAVENOUS; SUBCUTANEOUS 3 TIMES DAILY
Status: DISCONTINUED | OUTPATIENT
Start: 2024-05-02 | End: 2024-05-03

## 2024-05-02 RX ADMIN — ATORVASTATIN CALCIUM 40 MG: 40 TABLET, FILM COATED ORAL at 08:05

## 2024-05-02 RX ADMIN — RISPERIDONE 3 MG: 1 TABLET, FILM COATED ORAL at 08:05

## 2024-05-02 RX ADMIN — HYDROXYZINE HYDROCHLORIDE 50 MG: 25 TABLET ORAL at 08:05

## 2024-05-02 RX ADMIN — Medication 10 ML: at 11:05

## 2024-05-02 RX ADMIN — METOPROLOL TARTRATE 25 MG: 25 TABLET, FILM COATED ORAL at 09:05

## 2024-05-02 RX ADMIN — VANCOMYCIN HYDROCHLORIDE 1500 MG: 1.5 INJECTION, POWDER, LYOPHILIZED, FOR SOLUTION INTRAVENOUS at 09:05

## 2024-05-02 RX ADMIN — METHOCARBAMOL 500 MG: 500 TABLET ORAL at 11:05

## 2024-05-02 RX ADMIN — APIXABAN 5 MG: 5 TABLET, FILM COATED ORAL at 08:05

## 2024-05-02 RX ADMIN — Medication 10 ML: at 12:05

## 2024-05-02 RX ADMIN — Medication 400 MG: at 09:05

## 2024-05-02 RX ADMIN — INSULIN ASPART 3 UNITS: 100 INJECTION, SOLUTION INTRAVENOUS; SUBCUTANEOUS at 07:05

## 2024-05-02 RX ADMIN — HYDROCODONE BITARTRATE AND ACETAMINOPHEN 2 TABLET: 5; 325 TABLET ORAL at 11:05

## 2024-05-02 RX ADMIN — GABAPENTIN 600 MG: 300 CAPSULE ORAL at 09:05

## 2024-05-02 RX ADMIN — HYDROCODONE BITARTRATE AND ACETAMINOPHEN 2 TABLET: 5; 325 TABLET ORAL at 04:05

## 2024-05-02 RX ADMIN — BUMETANIDE 1 MG: 1 TABLET ORAL at 08:05

## 2024-05-02 RX ADMIN — MICONAZOLE NITRATE: 20 POWDER TOPICAL at 09:05

## 2024-05-02 RX ADMIN — INSULIN ASPART 3 UNITS: 100 INJECTION, SOLUTION INTRAVENOUS; SUBCUTANEOUS at 09:05

## 2024-05-02 RX ADMIN — DIVALPROEX SODIUM 500 MG: 250 TABLET, DELAYED RELEASE ORAL at 09:05

## 2024-05-02 RX ADMIN — QUETIAPINE 200 MG: 100 TABLET ORAL at 09:05

## 2024-05-02 RX ADMIN — INSULIN ASPART 5 UNITS: 100 INJECTION, SOLUTION INTRAVENOUS; SUBCUTANEOUS at 11:05

## 2024-05-02 RX ADMIN — ASPIRIN 81 MG CHEWABLE TABLET 81 MG: 81 TABLET CHEWABLE at 08:05

## 2024-05-02 RX ADMIN — GABAPENTIN 600 MG: 300 CAPSULE ORAL at 08:05

## 2024-05-02 RX ADMIN — HYDROCODONE BITARTRATE AND ACETAMINOPHEN 2 TABLET: 5; 325 TABLET ORAL at 05:05

## 2024-05-02 RX ADMIN — VANCOMYCIN HYDROCHLORIDE 1750 MG: 500 INJECTION, POWDER, LYOPHILIZED, FOR SOLUTION INTRAVENOUS at 10:05

## 2024-05-02 RX ADMIN — ONDANSETRON 4 MG: 2 INJECTION INTRAMUSCULAR; INTRAVENOUS at 10:05

## 2024-05-02 RX ADMIN — Medication 10 ML: at 04:05

## 2024-05-02 RX ADMIN — Medication 6 MG: at 09:05

## 2024-05-02 RX ADMIN — RISPERIDONE 3 MG: 1 TABLET, FILM COATED ORAL at 09:05

## 2024-05-02 RX ADMIN — MICONAZOLE NITRATE: 20 POWDER TOPICAL at 08:05

## 2024-05-02 RX ADMIN — LISINOPRIL 10 MG: 5 TABLET ORAL at 08:05

## 2024-05-02 RX ADMIN — INSULIN ASPART 10 UNITS: 100 INJECTION, SOLUTION INTRAVENOUS; SUBCUTANEOUS at 09:05

## 2024-05-02 RX ADMIN — METOPROLOL TARTRATE 25 MG: 25 TABLET, FILM COATED ORAL at 08:05

## 2024-05-02 RX ADMIN — INSULIN ASPART 8 UNITS: 100 INJECTION, SOLUTION INTRAVENOUS; SUBCUTANEOUS at 08:05

## 2024-05-02 RX ADMIN — PANTOPRAZOLE SODIUM 40 MG: 40 TABLET, DELAYED RELEASE ORAL at 08:05

## 2024-05-02 RX ADMIN — INSULIN DETEMIR 5 UNITS: 100 INJECTION, SOLUTION SUBCUTANEOUS at 09:05

## 2024-05-02 RX ADMIN — APIXABAN 5 MG: 5 TABLET, FILM COATED ORAL at 09:05

## 2024-05-02 RX ADMIN — GABAPENTIN 600 MG: 300 CAPSULE ORAL at 02:05

## 2024-05-02 RX ADMIN — INSULIN ASPART 10 UNITS: 100 INJECTION, SOLUTION INTRAVENOUS; SUBCUTANEOUS at 02:05

## 2024-05-02 RX ADMIN — Medication 10 ML: at 05:05

## 2024-05-02 RX ADMIN — HYDROXYZINE HYDROCHLORIDE 50 MG: 25 TABLET ORAL at 09:05

## 2024-05-02 NOTE — PROGRESS NOTES
Pharmacokinetic Assessment Follow Up: IV Vancomycin    Vancomycin serum concentration assessment(s):    The trough level was drawn correctly and can be used to guide therapy at this time. The measurement is within the desired definitive target range of 10 to 20 mcg/mL.    Vancomycin Regimen Plan:    Change regimen to Vancomycin 1500 mg IV every 12 hours with next serum trough concentration measured at 2100 prior to 3rd dose on 5/3/2024    Drug levels (last 3 results):  Recent Labs   Lab Result Units 05/02/24  1043   Vancomycin-Trough ug/mL 17.1       Pharmacy will continue to follow and monitor vancomycin.    Please contact pharmacy at extension [phone] for questions regarding this assessment.    Thank you for the consult,   Ja Carroll Jr       Patient brief summary:  Audrey Natarajan is a 53 y.o. female initiated on antimicrobial therapy with IV Vancomycin for treatment of skin & soft tissue infection    Drug Allergies:   Review of patient's allergies indicates:   Allergen Reactions    Morphine Other (See Comments)     Patient had a psychotic episode after taking Morphine  Agitation, hallucinations  Other Reaction(s): Other (See Comments), Other (See Comments)    Patient had a psychotic episode after taking Morphine    Patient had a psychotic episode after taking Morphine Agitation, hallucinations    Penicillins Anaphylaxis     Tolerated cephalosporins in the past    Januvia [sitagliptin] Hives    Carbamazepine Other (See Comments)     hyponatremia  Other Reaction(s): Other (See Comments)    hyponatremia       Actual Body Weight:   112 kg    Renal Function:   Estimated Creatinine Clearance: 105.6 mL/min (based on SCr of 0.8 mg/dL).,     Dialysis Method (if applicable):  N/A    CBC (last 72 hours):  Recent Labs   Lab Result Units 05/01/24  0604 05/02/24  0656   WBC K/uL 16.51* 11.50   Hemoglobin g/dL 12.1 12.4   Hematocrit % 38.1 39.3   Platelets K/uL 382 397   Gran % % 59.7 42.5   Lymph % % 24.2 39.0    Mono % % 12.3 12.0   Eosinophil % % 2.6 4.8   Basophil % % 0.7 0.9   Differential Method  Automated Automated       Metabolic Panel (last 72 hours):  Recent Labs   Lab Result Units 05/01/24  0414 05/01/24  0604 05/02/24  0656   Sodium mmol/L  --  136 137   Potassium mmol/L  --  5.2* 4.8   Chloride mmol/L  --  98 97   CO2 mmol/L  --  27 32*   Glucose mg/dL  --  387* 318*   Glucose, UA  4+*  --   --    BUN mg/dL  --  22* 16   Creatinine mg/dL  --  0.8 0.8   Albumin g/dL  --  2.9*  --    Total Bilirubin mg/dL  --  0.3  --    Alkaline Phosphatase U/L  --  68  --    AST U/L  --  14  --    ALT U/L  --  14  --    Magnesium mg/dL  --  1.3* 1.7   Phosphorus mg/dL  --  5.8* 5.0*       Vancomycin Administrations:  vancomycin given in the last 96 hours                     vancomycin (VANCOCIN) 1,750 mg in dextrose 5 % (D5W) 500 mL IVPB (mg) 1,750 mg New Bag 05/02/24 1047     1,750 mg New Bag 05/01/24 2300     1,750 mg New Bag  0942    vancomycin (VANCOCIN) 2,000 mg in sodium chloride 0.9% 500 mL IVPB (mg) 2,000 mg New Bag 04/30/24 2201                    Microbiologic Results:  Microbiology Results (last 7 days)       Procedure Component Value Units Date/Time    Urine culture [[id number]]  (Abnormal) Collected: 05/01/24 0414    Order Status: Completed Specimen: Urine Updated: 05/02/24 1138     Urine Culture, Routine STREPTOCOCCUS AGALACTIAE (GROUP B)  50,000 - 99,999 cfu/ml  In case of Penicillin allergy, call lab for further testing.  Beta-hemolytic streptococci are routinely susceptible to   penicillins,cephalosporins and carbapenems.      Narrative:      Specimen Source->Urine    Blood culture [[id number]] Collected: 05/01/24 0918    Order Status: Completed Specimen: Blood from Peripheral, Antecubital, Left Updated: 05/02/24 1103     Blood Culture, Routine No Growth to date      No Growth to date    Blood culture [[id number]] Collected: 05/01/24 0912    Order Status: Completed Specimen: Blood from Peripheral, Antecubital,  Right Updated: 05/02/24 1103     Blood Culture, Routine No Growth to date      No Growth to date    Rapid Organism ID by PCR (from Blood culture) [[id number]] Collected: 04/30/24 2127    Order Status: No result Updated: 05/02/24 0952    Blood culture #2 **CANNOT BE ORDERED STAT** [[id number]] Collected: 04/30/24 2136    Order Status: Completed Specimen: Blood from Peripheral, Antecubital, Right Updated: 05/02/24 0303     Blood Culture, Routine No Growth to date      No Growth to date    Blood culture #1 **CANNOT BE ORDERED STAT** [[id number]] Collected: 04/30/24 2127    Order Status: Completed Specimen: Blood from Peripheral, Forearm, Left Updated: 05/01/24 0755     Blood Culture, Routine Gram stain abbe bottle: Gram positive rods      Results called to and read back by: Lorrie Ritter 05/01/2024  07:54

## 2024-05-02 NOTE — PROGRESS NOTES
[facility] Medicine  Progress Note    Patient Name: Audrey Natarajan  MRN: [id number]  Patient Class: IP- Inpatient   Admission Date: 4/30/2024  Length of Stay: 2 days  Attending Physician: Velvet Galicia DO  Primary Care Provider: Donaldo Pena MD        Subjective:     Principal Problem:Facial cellulitis        HPI:  This is a 53-year-old female with a past medical history of COPD, type 2 diabetes, hypertension, hyperlipidemia, peripheral artery disease, bipolar disorder, history of VTE (on Eliquis), tobacco use, SHAWN, s/p left BKA, gastroparesis who presents with facial rash.     Patient presents for evaluation of multiple bumps noted on her face, scalp, back and chest after getting bit by mosquitos. She reports getting bit by multiple mosquitos and gnats about a week and a half prior to presentation. She went to dermatology and got liquid nitrogen treatment and later developed a worsening pulsating frontal headache. She reports similar symptoms with prior episodes of cellulitis.     In the ED, the patient was hemodynamically stable.  Labs were remarkable for leukocytosis (23.3), hyperglycemia (418).  CT head showed no acute intracranial abnormality.  Chest x-ray showed no acute process.  Patient was given 1 L of LR, 1 L of NS, Toradol 15 mg IV, Zofran 4 mg IV, Tylenol 1 g p.o., insulin 60 units IV, and was started on vancomycin.  She was admitted for further management.    Overview/Hospital Course:  53-year-old female with a past medical history of COPD, type 2 diabetes, hypertension, hyperlipidemia, peripheral artery disease, bipolar disorder, history of VTE (on Eliquis), tobacco use, SHAWN, s/p left BKA, gastroparesis admitted on 04/30/2024 for facial cellulitis.  Presented with lesions on her face, scalp, back, and chest after multiple insect bite.  Sites with localized erythema, concerning for developing cellulitis.  Also found to have positive blood culture with Gram-positive rods,  indetification and sensitivities pending.  Repeat blood culture with no growth to date.  Urine culture with GBD.  CT head with no acute process.  Patient initiated on vancomycin. Patient also found to have hyperkalemia, hyperphosphatemia, hypomagnesemia.  Electrolytes replaced as needed.  Patient also with uncontrolled diabetes given hyperglycemia.  Recent A1c 11.  Continue insulin, titrate as needed.  Await for final blood cultures, consider Infectious Disease consult once cultures finalized.    Interval History:  No acute overnight events.  Patient remained afebrile.  Continues to complain of itching from the insect bites, but improving.  Lesions are still present, but improving.  Tolerating diet without difficulty.  However, she has not following a diabetic diet.  Ate a blueberry muffin, drank to milk, and requested for two bagel with cream cheese.  Patient with hyperglycemia, titrate insulin    Review of Systems   Constitutional:  Negative for fatigue and fever.   Respiratory:  Negative for cough and shortness of breath.    Gastrointestinal:  Negative for nausea and vomiting.   Skin:  Positive for rash.   Neurological:  Positive for headaches. Negative for weakness.     Objective:     Vital Signs (Most Recent):  Temp: 98.4 °F (36.9 °C) (05/02/24 1137)  Pulse: 75 (05/02/24 1137)  Resp: 18 (05/02/24 1137)  BP: (!) 124/58 (05/02/24 1137)  SpO2: (!) 93 % (05/02/24 1137) Vital Signs (24h Range):  Temp:  [97.7 °F (36.5 °C)-99.7 °F (37.6 °C)] 98.4 °F (36.9 °C)  Pulse:  [71-88] 75  Resp:  [16-19] 18  SpO2:  [91 %-97 %] 93 %  BP: (102-136)/(55-82) 124/58     Weight: 112 kg (246 lb 14.4 oz)  Body mass index is 39.85 kg/m².    Intake/Output Summary (Last 24 hours) at 5/2/2024 1233  Last data filed at 5/2/2024 0818  Gross per 24 hour   Intake 720 ml   Output 0 ml   Net 720 ml         Physical Exam  Vitals and nursing note reviewed.   Constitutional:       General: She is not in acute distress.     Appearance: She is obese.  She is not ill-appearing.   HENT:      Head: Atraumatic.      Comments: Has lesions and a small soft know on her scalp. No active bleeding. Appears pruritic.      Nose: Nose normal.      Mouth/Throat:      Mouth: Mucous membranes are moist.   Eyes:      Extraocular Movements: Extraocular movements intact.   Cardiovascular:      Rate and Rhythm: Normal rate and regular rhythm.      Pulses: Normal pulses.      Heart sounds: No murmur heard.  Pulmonary:      Effort: Pulmonary effort is normal. No respiratory distress.   Abdominal:      General: Abdomen is flat.      Palpations: Abdomen is soft.      Tenderness: There is no abdominal tenderness.   Musculoskeletal:         General: Deformity (left BKA) present.      Right lower leg: No edema.      Comments: Left BKA   Skin:     General: Skin is warm.      Capillary Refill: Capillary refill takes less than 2 seconds.      Findings: Rash present. Rash is nodular and papular.      Comments: Present on her scalp, face, upper back bilaterally, and chest.    Neurological:      General: No focal deficit present.      Mental Status: She is alert and oriented to person, place, and time.   Psychiatric:         Mood and Affect: Mood normal.             Significant Labs: All pertinent labs within the past 24 hours have been reviewed.    Significant Imaging: I have reviewed all pertinent imaging results/findings within the past 24 hours.    Assessment/Plan:      * Facial cellulitis  Presents with facial/scalp, back and chest rash after an insect bite  Multiple sites with localized erythema. Concerning for developing cellulitis.   CT head showed no acute process   Continue vancomycin       Uncontrolled diabetes mellitus with hyperglycemia, with long-term current use of insulin  A1c:   Lab Results   Component Value Date    HGBA1C 11.0 (H) 04/22/2024     Meds: basal bolus insulin + SSI PRN to maintain goal 140-180  ADA diet, accuchecks ACHS, hypoglycemic protocol  -home regimen as  follows:  Levemir 15 units b.i.d., NovoLog 5 units 3 times daily  -continues to have hyperglycemia, likely uncontrolled given A1c 11 and also exacerbated in setting of infection  -titrate insulin as needed  -current regimen while inpatient:  Basal insulin at 25 units b.i.d., prandial insulin 10 units TIDWM.   -continue SSI      Bacteremia  -blood cultures from admission on 04/30 with Gram-positive rods  -repeat blood culture from 05/01 with no growth to date   -urine culture with Streptococcus achalasia thigh  -echocardiogram ordered   -called microbiology lab on 5/2 who confirmed it is not a contaminant.  -awaiting for final blood culture identification speciation  -continue vancomycin  -consider Infectious Disease consult once cultures are finalized for recommendations on antibiotics and duration    Hypomagnesemia  Patient has Abnormal Magnesium: hypomagnesemia. Will continue to monitor electrolytes closely. Will replace the affected electrolytes and repeat labs to be done after interventions completed. The patient's magnesium results have been reviewed and are listed below.  Recent Labs   Lab 05/02/24  0656   MG 1.7        -Mg level 1.3 on admission  -replace as needed    Essential hypertension  Chronic, controlled.     Latest blood pressure and vitals reviewed-     Temp:  [97.7 °F (36.5 °C)-99.7 °F (37.6 °C)]   Pulse:  [71-88]   Resp:  [16-19]   BP: (102-136)/(55-82)   SpO2:  [91 %-97 %] .   Home meds for hypertension were reviewed and noted below.   Hypertension Medications               bumetanide (BUMEX) 1 MG tablet Take 1 tablet (1 mg total) by mouth once daily.    lisinopriL 10 MG tablet Take 1 tablet (10 mg total) by mouth once daily.    metoprolol tartrate (LOPRESSOR) 25 MG tablet Take 1 tablet (25 mg total) by mouth 2 (two) times daily.            While in the hospital, will manage blood pressure as follows; Continue home antihypertensive regimen    Will utilize p.r.n. blood pressure medication only if  patient's blood pressure greater than 180/110 and she develops symptoms such as worsening chest pain or shortness of breath.    Hyperlipidemia  Continue statin       PAD (peripheral artery disease)  History noted.       Chronic deep vein thrombosis (DVT) of both lower extremities  Continue Eliquis       History of pulmonary embolism  Continue Eliquis       COPD (chronic obstructive pulmonary disease)  Patient's COPD is controlled currently.  Patient is currently off COPD Pathway.   Duonebs PRN    Bipolar 1 disorder  Continue home medications:  Risperidone 3 mg b.i.d., divalproex 500 mg nightly, Seroquel 200 mg nightly    Gastroparesis due to DM  History noted.   Glycemic control    SHAWN (obstructive sleep apnea)  Not on CPAP. Outpatient follow up with sleep medicine     Class 2 severe obesity with serious comorbidity and body mass index (BMI) of 37.0 to 37.9 in adult  Body mass index is 39.85 kg/m². Morbid obesity complicates all aspects of disease management from diagnostic modalities to treatment. Weight loss encouraged and health benefits explained to patient.           VTE Risk Mitigation (From admission, onward)           Ordered     apixaban tablet 5 mg  2 times daily         05/01/24 0311     IP VTE HIGH RISK PATIENT  Once         05/01/24 0256     Place sequential compression device  Until discontinued         05/01/24 0256                    Discharge Planning   HUMBERTO:      Code Status: Full Code   Is the patient medically ready for discharge?:     Reason for patient still in hospital (select all that apply): Patient trending condition, Laboratory test, and Treatment  Discharge Plan A: Tony Galicia DO  Department of Hospital Medicine   [facility] - Med Surg

## 2024-05-02 NOTE — PLAN OF CARE
Problem: Adult Inpatient Plan of Care  Goal: Plan of Care Review  Outcome: Progressing  Flowsheets (Taken 5/2/2024 0625)  Plan of Care Reviewed With: patient  Goal: Optimal Comfort and Wellbeing  Outcome: Progressing  Intervention: Monitor Pain and Promote Comfort  Flowsheets (Taken 5/2/2024 0625)  Pain Management Interventions:   pillow support provided   quiet environment facilitated   position adjusted   medication offered     Problem: Skin Injury Risk Increased  Goal: Skin Health and Integrity  Outcome: Progressing     Problem: Infection  Goal: Absence of Infection Signs and Symptoms  Outcome: Progressing

## 2024-05-02 NOTE — ASSESSMENT & PLAN NOTE
A1c:   Lab Results   Component Value Date    HGBA1C 11.0 (H) 04/22/2024     Meds: basal bolus insulin + SSI PRN to maintain goal 140-180  ADA diet, accuchecks ACHS, hypoglycemic protocol  -home regimen as follows:  Levemir 15 units b.i.d., NovoLog 5 units 3 times daily  -continues to have hyperglycemia, likely uncontrolled given A1c 11 and also exacerbated in setting of infection  -titrate insulin as needed  -current regimen while inpatient:  Basal insulin at 25 units b.i.d., prandial insulin 10 units TIDWM.   -continue SSI

## 2024-05-02 NOTE — ASSESSMENT & PLAN NOTE
Chronic, controlled.     Latest blood pressure and vitals reviewed-     Temp:  [97.7 °F (36.5 °C)-99.7 °F (37.6 °C)]   Pulse:  [71-88]   Resp:  [16-19]   BP: (102-136)/(55-82)   SpO2:  [91 %-97 %] .   Home meds for hypertension were reviewed and noted below.   Hypertension Medications               bumetanide (BUMEX) 1 MG tablet Take 1 tablet (1 mg total) by mouth once daily.    lisinopriL 10 MG tablet Take 1 tablet (10 mg total) by mouth once daily.    metoprolol tartrate (LOPRESSOR) 25 MG tablet Take 1 tablet (25 mg total) by mouth 2 (two) times daily.            While in the hospital, will manage blood pressure as follows; Continue home antihypertensive regimen    Will utilize p.r.n. blood pressure medication only if patient's blood pressure greater than 180/110 and she develops symptoms such as worsening chest pain or shortness of breath.

## 2024-05-02 NOTE — PLAN OF CARE
Plan of care is ongoing.      Problem: Adult Inpatient Plan of Care  Goal: Plan of Care Review  Outcome: Met  Goal: Patient-Specific Goal (Individualized)  Outcome: Met  Goal: Absence of Hospital-Acquired Illness or Injury  Outcome: Met  Goal: Optimal Comfort and Wellbeing  Outcome: Met  Goal: Readiness for Transition of Care  Outcome: Met     Problem: Diabetes Comorbidity  Goal: Blood Glucose Level Within Targeted Range  Outcome: Met     Problem: Skin Injury Risk Increased  Goal: Skin Health and Integrity  Outcome: Met     Problem: Infection  Goal: Absence of Infection Signs and Symptoms  Outcome: Met

## 2024-05-02 NOTE — PLAN OF CARE
Case Management Assessment     PCP: Donaldo Pena MD    Pharmacy:   [id number]  [place] [address] [place] [address] [facility] [address] [facility]  [address] [facility] [place] [address]  Phone: [phone] Fax: [phone]    [facility] [place] [address]  [address]   [address] [address]  Phone: [phone] Fax: [phone]        Patient Arrived From: home  Existing Help at Home: Anitra Cain (Sister) [phone] (Home Phone)     Barriers to Discharge: Blood cultures awaiting results    Discharge Plan:    A. Home    B. home      Lives alone.  Has a fried that stays with her Tues through Friday. Says she is independent with cane, wheel chair and rollator.  Says she has a left prosthesis.           05/01/24 1230   Discharge Assessment   Assessment Type Discharge Planning Assessment   Confirmed/corrected address, phone number and insurance Yes   Confirmed Demographics Correct on Facesheet   Source of Information patient   When was your last doctors appointment? 04/22/24   Communicated HUMBERTO with patient/caregiver Date not available/Unable to determine   People in Home alone   Do you expect to return to your current living situation? Yes   Do you have help at home or someone to help you manage your care at home? Yes   Who are your caregiver(s) and their phone number(s)? Anitra Cain (Sister)  [phone] (Home Phone)   Prior to hospitilization cognitive status: Alert/Oriented   Current cognitive status: Alert/Oriented   Walking or Climbing Stairs Difficulty yes   Walking or Climbing Stairs ambulation difficulty, requires equipment   Mobility Management rollator,  wheel chair, cane   Home Accessibility wheelchair accessible   Equipment Currently Used at Home cane, straight;wheelchair;rollator   Readmission within 30 days? No   Patient currently being followed by outpatient case management? Unable to determine (comments)   Do you currently have service(s) that help you  manage your care at home? No   Do you take prescription medications? Yes   Do you have prescription coverage? Yes   Coverage : MEDICA[place] - [place] [facility]   Who is going to help you get home at discharge? Anitra Cain (Sister)  [phone] (Home Phone)   How do you get to doctors appointments? car, drives self   Are you on dialysis? No   Do you take coumadin? No   Discharge Plan A Home   Discharge Plan B Home   DME Needed Upon Discharge  none   Discharge Plan discussed with: Patient   Transition of Care Barriers None

## 2024-05-02 NOTE — NURSING
[facility] [place]  Nurses Note -- 4 Eyes      5/1/2024       Skin assessed on: Q Shift      [x] No Pressure Injuries Present    [x]Prevention Measures Documented    [] Yes LDA  for Pressure Injury Previously documented     [] Yes New Pressure Injury Discovered   [] LDA for New Pressure Injury Added      Attending RN:  Lorrie Ritter RN     Second RN:  DANK Ratliff

## 2024-05-02 NOTE — ASSESSMENT & PLAN NOTE
Patient has Abnormal Magnesium: hypomagnesemia. Will continue to monitor electrolytes closely. Will replace the affected electrolytes and repeat labs to be done after interventions completed. The patient's magnesium results have been reviewed and are listed below.  Recent Labs   Lab 05/02/24  0656   MG 1.7        -Mg level 1.3 on admission  -replace as needed

## 2024-05-02 NOTE — ASSESSMENT & PLAN NOTE
-blood cultures from admission on 04/30 with Gram-positive rods  -repeat blood culture from 05/01 with no growth to date   -urine culture with Streptococcus achalasia thigh  -echocardiogram ordered   -called microbiology lab on 5/2 who confirmed it is not a contaminant.  -awaiting for final blood culture identification speciation  -continue vancomycin  -consider Infectious Disease consult once cultures are finalized for recommendations on antibiotics and duration

## 2024-05-02 NOTE — SUBJECTIVE & OBJECTIVE
Interval History:  No acute overnight events.  Patient remained afebrile.  Continues to complain of itching from the insect bites, but improving.  Lesions are still present, but improving.  Tolerating diet without difficulty.  However, she has not following a diabetic diet.  Ate a blueberry muffin, drank to milk, and requested for two bagel with cream cheese.  Patient with hyperglycemia, titrate insulin    Review of Systems   Constitutional:  Negative for fatigue and fever.   Respiratory:  Negative for cough and shortness of breath.    Gastrointestinal:  Negative for nausea and vomiting.   Skin:  Positive for rash.   Neurological:  Positive for headaches. Negative for weakness.     Objective:     Vital Signs (Most Recent):  Temp: 98.4 °F (36.9 °C) (05/02/24 1137)  Pulse: 75 (05/02/24 1137)  Resp: 18 (05/02/24 1137)  BP: (!) 124/58 (05/02/24 1137)  SpO2: (!) 93 % (05/02/24 1137) Vital Signs (24h Range):  Temp:  [97.7 °F (36.5 °C)-99.7 °F (37.6 °C)] 98.4 °F (36.9 °C)  Pulse:  [71-88] 75  Resp:  [16-19] 18  SpO2:  [91 %-97 %] 93 %  BP: (102-136)/(55-82) 124/58     Weight: 112 kg (246 lb 14.4 oz)  Body mass index is 39.85 kg/m².    Intake/Output Summary (Last 24 hours) at 5/2/2024 1233  Last data filed at 5/2/2024 0818  Gross per 24 hour   Intake 720 ml   Output 0 ml   Net 720 ml         Physical Exam  Vitals and nursing note reviewed.   Constitutional:       General: She is not in acute distress.     Appearance: She is obese. She is not ill-appearing.   HENT:      Head: Atraumatic.      Comments: Has lesions and a small soft know on her scalp. No active bleeding. Appears pruritic.      Nose: Nose normal.      Mouth/Throat:      Mouth: Mucous membranes are moist.   Eyes:      Extraocular Movements: Extraocular movements intact.   Cardiovascular:      Rate and Rhythm: Normal rate and regular rhythm.      Pulses: Normal pulses.      Heart sounds: No murmur heard.  Pulmonary:      Effort: Pulmonary effort is normal. No  respiratory distress.   Abdominal:      General: Abdomen is flat.      Palpations: Abdomen is soft.      Tenderness: There is no abdominal tenderness.   Musculoskeletal:         General: Deformity (left BKA) present.      Right lower leg: No edema.      Comments: Left BKA   Skin:     General: Skin is warm.      Capillary Refill: Capillary refill takes less than 2 seconds.      Findings: Rash present. Rash is nodular and papular.      Comments: Present on her scalp, face, upper back bilaterally, and chest.    Neurological:      General: No focal deficit present.      Mental Status: She is alert and oriented to person, place, and time.   Psychiatric:         Mood and Affect: Mood normal.             Significant Labs: All pertinent labs within the past 24 hours have been reviewed.    Significant Imaging: I have reviewed all pertinent imaging results/findings within the past 24 hours.

## 2024-05-02 NOTE — NURSING
[facility], [facility]  Nurses Note -- 4 Eyes      5/2/2024       Skin assessed on: Q Shift      [x] No Pressure Injuries Present    [x]Prevention Measures Documented    [] Yes LDA  for Pressure Injury Previously documented     [] Yes New Pressure Injury Discovered   [] LDA for New Pressure Injury Added      Attending RN:  Osmany Rojas RN     Second RN:  Rodrigo CHAN RN

## 2024-05-02 NOTE — NURSING
[facility] [place]  [place]urses Note -- 4 Eyes      5/1/2024       Skin assessed on: Q Shift      [x] No Pressure Injuries Present    []Prevention Measures Documented    [] Yes LDA  for Pressure Injury Previously documented     [] Yes New Pressure Injury Discovered   [] LDA for New Pressure Injury Added      Attending RN:  Rodrigo Pearson RN     Second RN:  DANK Andrew

## 2024-05-02 NOTE — ASSESSMENT & PLAN NOTE
Continue home medications:  Risperidone 3 mg b.i.d., divalproex 500 mg nightly, Seroquel 200 mg nightly
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
## 2024-05-03 ENCOUNTER — TELEPHONE (OUTPATIENT)
Dept: FAMILY MEDICINE | Facility: CLINIC | Age: 54
End: 2024-05-03
Payer: MEDICAID

## 2024-05-03 PROBLEM — E87.3 METABOLIC ALKALOSIS: Status: ACTIVE | Noted: 2024-05-03

## 2024-05-03 LAB
ANION GAP SERPL CALC-SCNC: 10 MMOL/L (ref 8–16)
BACTERIA BLD CULT: ABNORMAL
BACTERIA UR CULT: ABNORMAL
BASOPHILS # BLD AUTO: 0.16 K/UL (ref 0–0.2)
BASOPHILS NFR BLD: 1.4 % (ref 0–1.9)
BUN SERPL-MCNC: 17 MG/DL (ref 6–20)
CALCIUM SERPL-MCNC: 9.1 MG/DL (ref 8.7–10.5)
CHLORIDE SERPL-SCNC: 94 MMOL/L (ref 95–110)
CO2 SERPL-SCNC: 33 MMOL/L (ref 23–29)
CREAT SERPL-MCNC: 1 MG/DL (ref 0.5–1.4)
DIFFERENTIAL METHOD BLD: ABNORMAL
EOSINOPHIL # BLD AUTO: 0.7 K/UL (ref 0–0.5)
EOSINOPHIL NFR BLD: 6.5 % (ref 0–8)
ERYTHROCYTE [DISTWIDTH] IN BLOOD BY AUTOMATED COUNT: 14.2 % (ref 11.5–14.5)
EST. GFR  (NO RACE VARIABLE): >60 ML/MIN/1.73 M^2
GLUCOSE SERPL-MCNC: 320 MG/DL (ref 70–110)
HCT VFR BLD AUTO: 38.3 % (ref 37–48.5)
HGB BLD-MCNC: 12.1 G/DL (ref 12–16)
IMM GRANULOCYTES # BLD AUTO: 0.11 K/UL (ref 0–0.04)
IMM GRANULOCYTES NFR BLD AUTO: 1 % (ref 0–0.5)
LYMPHOCYTES # BLD AUTO: 6.1 K/UL (ref 1–4.8)
LYMPHOCYTES NFR BLD: 54.4 % (ref 18–48)
MAGNESIUM SERPL-MCNC: 1.7 MG/DL (ref 1.6–2.6)
MCH RBC QN AUTO: 27.4 PG (ref 27–31)
MCHC RBC AUTO-ENTMCNC: 31.6 G/DL (ref 32–36)
MCV RBC AUTO: 87 FL (ref 82–98)
MONOCYTES # BLD AUTO: 1.3 K/UL (ref 0.3–1)
MONOCYTES NFR BLD: 11.4 % (ref 4–15)
NEUTROPHILS # BLD AUTO: 2.9 K/UL (ref 1.8–7.7)
NEUTROPHILS NFR BLD: 25.3 % (ref 38–73)
NRBC BLD-RTO: 0 /100 WBC
PHOSPHATE SERPL-MCNC: 5.2 MG/DL (ref 2.7–4.5)
PLATELET # BLD AUTO: 363 K/UL (ref 150–450)
PMV BLD AUTO: 10.7 FL (ref 9.2–12.9)
POCT GLUCOSE: 276 MG/DL (ref 70–110)
POCT GLUCOSE: 324 MG/DL (ref 70–110)
POCT GLUCOSE: 326 MG/DL (ref 70–110)
POCT GLUCOSE: 342 MG/DL (ref 70–110)
POTASSIUM SERPL-SCNC: 4.6 MMOL/L (ref 3.5–5.1)
RBC # BLD AUTO: 4.41 M/UL (ref 4–5.4)
SODIUM SERPL-SCNC: 137 MMOL/L (ref 136–145)
VANCOMYCIN TROUGH SERPL-MCNC: 15.1 UG/ML (ref 10–22)
WBC # BLD AUTO: 11.28 K/UL (ref 3.9–12.7)

## 2024-05-03 PROCEDURE — 27000221 HC OXYGEN, UP TO 24 HOURS

## 2024-05-03 PROCEDURE — 25000003 PHARM REV CODE 250: Performed by: STUDENT IN AN ORGANIZED HEALTH CARE EDUCATION/TRAINING PROGRAM

## 2024-05-03 PROCEDURE — 80048 BASIC METABOLIC PNL TOTAL CA: CPT | Performed by: STUDENT IN AN ORGANIZED HEALTH CARE EDUCATION/TRAINING PROGRAM

## 2024-05-03 PROCEDURE — 25000003 PHARM REV CODE 250

## 2024-05-03 PROCEDURE — 63600175 PHARM REV CODE 636 W HCPCS: Mod: JZ,JG | Performed by: INTERNAL MEDICINE

## 2024-05-03 PROCEDURE — 84100 ASSAY OF PHOSPHORUS: CPT | Performed by: STUDENT IN AN ORGANIZED HEALTH CARE EDUCATION/TRAINING PROGRAM

## 2024-05-03 PROCEDURE — 11000001 HC ACUTE MED/SURG PRIVATE ROOM

## 2024-05-03 PROCEDURE — 63600175 PHARM REV CODE 636 W HCPCS: Performed by: STUDENT IN AN ORGANIZED HEALTH CARE EDUCATION/TRAINING PROGRAM

## 2024-05-03 PROCEDURE — 85025 COMPLETE CBC W/AUTO DIFF WBC: CPT | Performed by: STUDENT IN AN ORGANIZED HEALTH CARE EDUCATION/TRAINING PROGRAM

## 2024-05-03 PROCEDURE — 80202 ASSAY OF VANCOMYCIN: CPT | Performed by: STUDENT IN AN ORGANIZED HEALTH CARE EDUCATION/TRAINING PROGRAM

## 2024-05-03 PROCEDURE — A4216 STERILE WATER/SALINE, 10 ML: HCPCS | Performed by: STUDENT IN AN ORGANIZED HEALTH CARE EDUCATION/TRAINING PROGRAM

## 2024-05-03 PROCEDURE — 99222 1ST HOSP IP/OBS MODERATE 55: CPT | Mod: ,,, | Performed by: INTERNAL MEDICINE

## 2024-05-03 PROCEDURE — 25000003 PHARM REV CODE 250: Performed by: PHYSICIAN ASSISTANT

## 2024-05-03 PROCEDURE — 94761 N-INVAS EAR/PLS OXIMETRY MLT: CPT

## 2024-05-03 PROCEDURE — 83735 ASSAY OF MAGNESIUM: CPT | Performed by: STUDENT IN AN ORGANIZED HEALTH CARE EDUCATION/TRAINING PROGRAM

## 2024-05-03 PROCEDURE — 25000003 PHARM REV CODE 250: Performed by: INTERNAL MEDICINE

## 2024-05-03 RX ORDER — METRONIDAZOLE 500 MG/100ML
500 INJECTION, SOLUTION INTRAVENOUS
Status: DISCONTINUED | OUTPATIENT
Start: 2024-05-03 | End: 2024-05-04 | Stop reason: HOSPADM

## 2024-05-03 RX ORDER — ACETAMINOPHEN 325 MG/1
650 TABLET ORAL EVERY 8 HOURS PRN
Status: DISCONTINUED | OUTPATIENT
Start: 2024-05-03 | End: 2024-05-04 | Stop reason: HOSPADM

## 2024-05-03 RX ORDER — HYDROCODONE BITARTRATE AND ACETAMINOPHEN 10; 325 MG/1; MG/1
1 TABLET ORAL EVERY 6 HOURS PRN
Status: DISCONTINUED | OUTPATIENT
Start: 2024-05-03 | End: 2024-05-04 | Stop reason: HOSPADM

## 2024-05-03 RX ORDER — LISINOPRIL 5 MG/1
10 TABLET ORAL NIGHTLY
Status: DISCONTINUED | OUTPATIENT
Start: 2024-05-04 | End: 2024-05-04 | Stop reason: HOSPADM

## 2024-05-03 RX ORDER — SODIUM CHLORIDE 9 MG/ML
INJECTION, SOLUTION INTRAVENOUS CONTINUOUS
Status: DISCONTINUED | OUTPATIENT
Start: 2024-05-03 | End: 2024-05-04

## 2024-05-03 RX ORDER — POLYETHYLENE GLYCOL 3350 17 G/17G
17 POWDER, FOR SOLUTION ORAL 2 TIMES DAILY
Status: DISCONTINUED | OUTPATIENT
Start: 2024-05-03 | End: 2024-05-04 | Stop reason: HOSPADM

## 2024-05-03 RX ORDER — METOPROLOL TARTRATE 25 MG/1
12.5 TABLET ORAL 2 TIMES DAILY
Status: DISCONTINUED | OUTPATIENT
Start: 2024-05-03 | End: 2024-05-04 | Stop reason: HOSPADM

## 2024-05-03 RX ORDER — BUMETANIDE 1 MG/1
1 TABLET ORAL DAILY
Status: DISCONTINUED | OUTPATIENT
Start: 2024-05-05 | End: 2024-05-04 | Stop reason: HOSPADM

## 2024-05-03 RX ORDER — INSULIN ASPART 100 [IU]/ML
14 INJECTION, SOLUTION INTRAVENOUS; SUBCUTANEOUS 3 TIMES DAILY
Status: DISCONTINUED | OUTPATIENT
Start: 2024-05-03 | End: 2024-05-04 | Stop reason: HOSPADM

## 2024-05-03 RX ADMIN — HYDROCODONE BITARTRATE AND ACETAMINOPHEN 2 TABLET: 5; 325 TABLET ORAL at 11:05

## 2024-05-03 RX ADMIN — VANCOMYCIN HYDROCHLORIDE 1500 MG: 1.5 INJECTION, POWDER, LYOPHILIZED, FOR SOLUTION INTRAVENOUS at 11:05

## 2024-05-03 RX ADMIN — GABAPENTIN 600 MG: 300 CAPSULE ORAL at 09:05

## 2024-05-03 RX ADMIN — DIVALPROEX SODIUM 500 MG: 250 TABLET, DELAYED RELEASE ORAL at 10:05

## 2024-05-03 RX ADMIN — GABAPENTIN 600 MG: 300 CAPSULE ORAL at 03:05

## 2024-05-03 RX ADMIN — HYDROCODONE BITARTRATE AND ACETAMINOPHEN 2 TABLET: 5; 325 TABLET ORAL at 06:05

## 2024-05-03 RX ADMIN — HYDROCODONE BITARTRATE AND ACETAMINOPHEN 1 TABLET: 10; 325 TABLET ORAL at 11:05

## 2024-05-03 RX ADMIN — Medication 10 ML: at 04:05

## 2024-05-03 RX ADMIN — ONDANSETRON 4 MG: 2 INJECTION INTRAMUSCULAR; INTRAVENOUS at 10:05

## 2024-05-03 RX ADMIN — QUETIAPINE 200 MG: 100 TABLET ORAL at 10:05

## 2024-05-03 RX ADMIN — METOPROLOL TARTRATE 25 MG: 25 TABLET, FILM COATED ORAL at 09:05

## 2024-05-03 RX ADMIN — ATORVASTATIN CALCIUM 40 MG: 40 TABLET, FILM COATED ORAL at 09:05

## 2024-05-03 RX ADMIN — APIXABAN 5 MG: 5 TABLET, FILM COATED ORAL at 10:05

## 2024-05-03 RX ADMIN — ASPIRIN 81 MG CHEWABLE TABLET 81 MG: 81 TABLET CHEWABLE at 09:05

## 2024-05-03 RX ADMIN — INSULIN ASPART 14 UNITS: 100 INJECTION, SOLUTION INTRAVENOUS; SUBCUTANEOUS at 10:05

## 2024-05-03 RX ADMIN — ONDANSETRON 4 MG: 2 INJECTION INTRAMUSCULAR; INTRAVENOUS at 11:05

## 2024-05-03 RX ADMIN — METHOCARBAMOL 500 MG: 500 TABLET ORAL at 03:05

## 2024-05-03 RX ADMIN — Medication 10 ML: at 11:05

## 2024-05-03 RX ADMIN — INSULIN ASPART 2 UNITS: 100 INJECTION, SOLUTION INTRAVENOUS; SUBCUTANEOUS at 10:05

## 2024-05-03 RX ADMIN — METRONIDAZOLE 500 MG: 500 INJECTION, SOLUTION INTRAVENOUS at 03:05

## 2024-05-03 RX ADMIN — APIXABAN 5 MG: 5 TABLET, FILM COATED ORAL at 09:05

## 2024-05-03 RX ADMIN — POLYETHYLENE GLYCOL 3350 17 G: 17 POWDER, FOR SOLUTION ORAL at 10:05

## 2024-05-03 RX ADMIN — INSULIN ASPART 4 UNITS: 100 INJECTION, SOLUTION INTRAVENOUS; SUBCUTANEOUS at 03:05

## 2024-05-03 RX ADMIN — GABAPENTIN 600 MG: 300 CAPSULE ORAL at 10:05

## 2024-05-03 RX ADMIN — INSULIN ASPART 4 UNITS: 100 INJECTION, SOLUTION INTRAVENOUS; SUBCUTANEOUS at 11:05

## 2024-05-03 RX ADMIN — RISPERIDONE 3 MG: 1 TABLET, FILM COATED ORAL at 10:05

## 2024-05-03 RX ADMIN — BISACODYL 10 MG: 10 SUPPOSITORY RECTAL at 02:05

## 2024-05-03 RX ADMIN — HYDROCODONE BITARTRATE AND ACETAMINOPHEN 2 TABLET: 5; 325 TABLET ORAL at 12:05

## 2024-05-03 RX ADMIN — Medication 10 ML: at 05:05

## 2024-05-03 RX ADMIN — PANTOPRAZOLE SODIUM 40 MG: 40 TABLET, DELAYED RELEASE ORAL at 09:05

## 2024-05-03 RX ADMIN — INSULIN ASPART 14 UNITS: 100 INJECTION, SOLUTION INTRAVENOUS; SUBCUTANEOUS at 03:05

## 2024-05-03 RX ADMIN — METHOCARBAMOL 500 MG: 500 TABLET ORAL at 09:05

## 2024-05-03 RX ADMIN — BUMETANIDE 1 MG: 1 TABLET ORAL at 09:05

## 2024-05-03 RX ADMIN — METOPROLOL TARTRATE 12.5 MG: 25 TABLET, FILM COATED ORAL at 10:05

## 2024-05-03 RX ADMIN — METRONIDAZOLE 500 MG: 500 INJECTION, SOLUTION INTRAVENOUS at 09:05

## 2024-05-03 RX ADMIN — LISINOPRIL 10 MG: 5 TABLET ORAL at 09:05

## 2024-05-03 RX ADMIN — INSULIN ASPART 3 UNITS: 100 INJECTION, SOLUTION INTRAVENOUS; SUBCUTANEOUS at 09:05

## 2024-05-03 RX ADMIN — RISPERIDONE 3 MG: 1 TABLET, FILM COATED ORAL at 09:05

## 2024-05-03 RX ADMIN — INSULIN ASPART 10 UNITS: 100 INJECTION, SOLUTION INTRAVENOUS; SUBCUTANEOUS at 09:05

## 2024-05-03 RX ADMIN — METHOCARBAMOL 500 MG: 500 TABLET ORAL at 10:05

## 2024-05-03 RX ADMIN — VANCOMYCIN HYDROCHLORIDE 1500 MG: 1.5 INJECTION, POWDER, LYOPHILIZED, FOR SOLUTION INTRAVENOUS at 10:05

## 2024-05-03 RX ADMIN — SODIUM CHLORIDE: 9 INJECTION, SOLUTION INTRAVENOUS at 11:05

## 2024-05-03 NOTE — PLAN OF CARE
Plan of care is ongoing.      Problem: Adult Inpatient Plan of Care  Goal: Plan of Care Review  Outcome: Progressing  Goal: Patient-Specific Goal (Individualized)  Outcome: Progressing  Goal: Absence of Hospital-Acquired Illness or Injury  Outcome: Progressing  Goal: Optimal Comfort and Wellbeing  Outcome: Progressing  Goal: Readiness for Transition of Care  Outcome: Progressing

## 2024-05-03 NOTE — NURSING
[facility], [facility] [place]  Nurses Note -- 4 Eyes      5/3/2024       Skin assessed on: Q Shift      [x] No Pressure Injuries Present    [x]Prevention Measures Documented    [] Yes LDA  for Pressure Injury Previously documented     [] Yes New Pressure Injury Discovered   [] LDA for New Pressure Injury Added      Attending RN:  Osmany Rojas RN     Second RN:  DANK Wallace

## 2024-05-03 NOTE — CONSULTS
[facility] Hospital Medicine  Telemedicine Consult Note       Patient has been accepted for transfer to [facility] and will be followed through telemedicine services beginning at 7 AM.        Mariam Montano MD  [facility] Medicine Staff

## 2024-05-03 NOTE — NURSING
[facility], [facility] [place]  Nurses Note -- 4 Eyes      5/2/2024       Skin assessed on: Q Shift      [x] No Pressure Injuries Present    [x]Prevention Measures Documented    [] Yes LDA  for Pressure Injury Previously documented     [] Yes New Pressure Injury Discovered   [] LDA for New Pressure Injury Added      Attending RN:  Rodrigo Pearson RN     Second RN:  DANK Ceron

## 2024-05-03 NOTE — TELEPHONE ENCOUNTER
----- Message from Sushma Mtz sent at 5/3/2024  2:52 PM CDT -----  Name of Who is Calling: Bernetta with [facility] is calling on behalf of    SABIHA DUNNE [[id number]]                 What is the request in detail:Pt is requesting a call back to have patient scheduled for hospital follow up. Pt need to be seen with 7 days. Please assist.                     Can the clinic reply by [facility]: No                    What [facility] to [facility] Back [facility]:  [phone]

## 2024-05-03 NOTE — HPI
"53F with h/o tobacco abuse, bipolar, htn, well known to ID from h/o recurrent foot infections, now s/p L BKA admitted 4/30 with bumps on face, scalp, back. Pt reports she was outside and got numerous bites from mosquitos. Worried she had cellulitis and came to hospital. Denies f/c. Has been having abdominal tenderness and some diarrhea, now constipation recently. Noticed improvement in bumps since being in the hospital. Says she's waiting on her prosthetic leg to have some adjustments made and not currently using it. Denies dysuria. Denies any purulent drainage from wounds. Reports utd on colonoscopy, but doesn't recall when (I see egd done 2014, but no colonoscopy results available to review)        Bcx 1 of 2 with   Component 3 d ago   Blood Culture, Routine Gram stain abbe bottle: Gram positive rods   Blood Culture, Routine Results called to and read back by: Lorrie Ritter 05/01/2024  07:54   Blood Culture, Routine CLOSTRIDIUM PERFRINGENS Abnormal        UA 28 wbc, 3 squam    Ucx with GBS    Day 4 vanc. Flagyl just added      Afebrile      ID consulted for "clostridium perfringens bacteremia   "

## 2024-05-03 NOTE — NURSING
Patient remained free of falls during shift.Fall/safety precautions in place.  Pain mediations given as ordered.   to review pain mediation dosages pending.   PRN bisacodyl suppository given as ordered.   No acute distress noted at this time. Will report to night nurse.

## 2024-05-04 VITALS
SYSTOLIC BLOOD PRESSURE: 130 MMHG | HEIGHT: 66 IN | DIASTOLIC BLOOD PRESSURE: 59 MMHG | RESPIRATION RATE: 16 BRPM | HEART RATE: 71 BPM | TEMPERATURE: 98 F | WEIGHT: 246.88 LBS | OXYGEN SATURATION: 95 % | BODY MASS INDEX: 39.68 KG/M2

## 2024-05-04 LAB
ANION GAP SERPL CALC-SCNC: 11 MMOL/L (ref 8–16)
BASOPHILS # BLD AUTO: 0.19 K/UL (ref 0–0.2)
BASOPHILS NFR BLD: 1.8 % (ref 0–1.9)
BNP SERPL-MCNC: 24 PG/ML (ref 0–99)
BUN SERPL-MCNC: 17 MG/DL (ref 6–20)
CALCIUM SERPL-MCNC: 8.6 MG/DL (ref 8.7–10.5)
CHLORIDE SERPL-SCNC: 96 MMOL/L (ref 95–110)
CO2 SERPL-SCNC: 31 MMOL/L (ref 23–29)
CREAT SERPL-MCNC: 0.9 MG/DL (ref 0.5–1.4)
DIFFERENTIAL METHOD BLD: ABNORMAL
EOSINOPHIL # BLD AUTO: 0.7 K/UL (ref 0–0.5)
EOSINOPHIL NFR BLD: 7.1 % (ref 0–8)
ERYTHROCYTE [DISTWIDTH] IN BLOOD BY AUTOMATED COUNT: 14.2 % (ref 11.5–14.5)
EST. GFR  (NO RACE VARIABLE): >60 ML/MIN/1.73 M^2
GLUCOSE SERPL-MCNC: 256 MG/DL (ref 70–110)
HCT VFR BLD AUTO: 38.4 % (ref 37–48.5)
HGB BLD-MCNC: 11.8 G/DL (ref 12–16)
IMM GRANULOCYTES # BLD AUTO: 0.11 K/UL (ref 0–0.04)
IMM GRANULOCYTES NFR BLD AUTO: 1.1 % (ref 0–0.5)
LYMPHOCYTES # BLD AUTO: 5.8 K/UL (ref 1–4.8)
LYMPHOCYTES NFR BLD: 55.8 % (ref 18–48)
MAGNESIUM SERPL-MCNC: 1.6 MG/DL (ref 1.6–2.6)
MCH RBC QN AUTO: 27.7 PG (ref 27–31)
MCHC RBC AUTO-ENTMCNC: 30.7 G/DL (ref 32–36)
MCV RBC AUTO: 90 FL (ref 82–98)
MONOCYTES # BLD AUTO: 1.3 K/UL (ref 0.3–1)
MONOCYTES NFR BLD: 12.6 % (ref 4–15)
NEUTROPHILS # BLD AUTO: 2.2 K/UL (ref 1.8–7.7)
NEUTROPHILS NFR BLD: 21.6 % (ref 38–73)
NRBC BLD-RTO: 0 /100 WBC
PHOSPHATE SERPL-MCNC: 5.1 MG/DL (ref 2.7–4.5)
PLATELET # BLD AUTO: 376 K/UL (ref 150–450)
PMV BLD AUTO: 11 FL (ref 9.2–12.9)
POCT GLUCOSE: 230 MG/DL (ref 70–110)
POCT GLUCOSE: 269 MG/DL (ref 70–110)
POTASSIUM SERPL-SCNC: 4.4 MMOL/L (ref 3.5–5.1)
RBC # BLD AUTO: 4.26 M/UL (ref 4–5.4)
SODIUM SERPL-SCNC: 138 MMOL/L (ref 136–145)
WBC # BLD AUTO: 10.33 K/UL (ref 3.9–12.7)

## 2024-05-04 PROCEDURE — 84100 ASSAY OF PHOSPHORUS: CPT | Performed by: STUDENT IN AN ORGANIZED HEALTH CARE EDUCATION/TRAINING PROGRAM

## 2024-05-04 PROCEDURE — 83735 ASSAY OF MAGNESIUM: CPT | Performed by: STUDENT IN AN ORGANIZED HEALTH CARE EDUCATION/TRAINING PROGRAM

## 2024-05-04 PROCEDURE — 63600175 PHARM REV CODE 636 W HCPCS: Performed by: STUDENT IN AN ORGANIZED HEALTH CARE EDUCATION/TRAINING PROGRAM

## 2024-05-04 PROCEDURE — 85025 COMPLETE CBC W/AUTO DIFF WBC: CPT | Performed by: STUDENT IN AN ORGANIZED HEALTH CARE EDUCATION/TRAINING PROGRAM

## 2024-05-04 PROCEDURE — 25000003 PHARM REV CODE 250: Performed by: STUDENT IN AN ORGANIZED HEALTH CARE EDUCATION/TRAINING PROGRAM

## 2024-05-04 PROCEDURE — 25000003 PHARM REV CODE 250

## 2024-05-04 PROCEDURE — 80048 BASIC METABOLIC PNL TOTAL CA: CPT | Performed by: STUDENT IN AN ORGANIZED HEALTH CARE EDUCATION/TRAINING PROGRAM

## 2024-05-04 PROCEDURE — 83880 ASSAY OF NATRIURETIC PEPTIDE: CPT | Performed by: INTERNAL MEDICINE

## 2024-05-04 PROCEDURE — 27000221 HC OXYGEN, UP TO 24 HOURS

## 2024-05-04 PROCEDURE — A4216 STERILE WATER/SALINE, 10 ML: HCPCS | Performed by: STUDENT IN AN ORGANIZED HEALTH CARE EDUCATION/TRAINING PROGRAM

## 2024-05-04 PROCEDURE — 94760 N-INVAS EAR/PLS OXIMETRY 1: CPT

## 2024-05-04 PROCEDURE — 63600175 PHARM REV CODE 636 W HCPCS: Mod: JZ,JG | Performed by: INTERNAL MEDICINE

## 2024-05-04 PROCEDURE — 25000003 PHARM REV CODE 250: Performed by: INTERNAL MEDICINE

## 2024-05-04 RX ORDER — INSULIN ASPART 100 [IU]/ML
7 INJECTION, SOLUTION INTRAVENOUS; SUBCUTANEOUS 3 TIMES DAILY
COMMUNITY
Start: 2024-05-04

## 2024-05-04 RX ORDER — INSULIN ASPART 100 [IU]/ML
0-5 INJECTION, SOLUTION INTRAVENOUS; SUBCUTANEOUS
COMMUNITY
Start: 2024-05-04

## 2024-05-04 RX ORDER — FLUCONAZOLE 150 MG/1
150 TABLET ORAL DAILY
Qty: 2 TABLET | Refills: 0 | Status: SHIPPED | OUTPATIENT
Start: 2024-05-04

## 2024-05-04 RX ORDER — HYDROCODONE BITARTRATE AND ACETAMINOPHEN 10; 325 MG/1; MG/1
1 TABLET ORAL EVERY 6 HOURS PRN
Qty: 15 TABLET | Refills: 0 | Status: SHIPPED | OUTPATIENT
Start: 2024-05-04

## 2024-05-04 RX ORDER — METRONIDAZOLE 500 MG/1
500 TABLET ORAL EVERY 8 HOURS
Qty: 39 TABLET | Refills: 0 | Status: SHIPPED | OUTPATIENT
Start: 2024-05-04 | End: 2024-05-17

## 2024-05-04 RX ORDER — NYSTATIN 100000 [USP'U]/G
POWDER TOPICAL
Qty: 60 G | Refills: 2 | Status: SHIPPED | OUTPATIENT
Start: 2024-05-04

## 2024-05-04 RX ORDER — LISINOPRIL 10 MG/1
10 TABLET ORAL NIGHTLY
Qty: 90 TABLET | Refills: 0 | Status: SHIPPED | OUTPATIENT
Start: 2024-05-04

## 2024-05-04 RX ORDER — TRAMADOL HYDROCHLORIDE 50 MG/1
50 TABLET ORAL EVERY 8 HOURS PRN
Start: 2024-05-08 | End: 2024-05-24 | Stop reason: SDUPTHER

## 2024-05-04 RX ADMIN — VANCOMYCIN HYDROCHLORIDE 1500 MG: 1.5 INJECTION, POWDER, LYOPHILIZED, FOR SOLUTION INTRAVENOUS at 11:05

## 2024-05-04 RX ADMIN — Medication 10 ML: at 11:05

## 2024-05-04 RX ADMIN — INSULIN ASPART 14 UNITS: 100 INJECTION, SOLUTION INTRAVENOUS; SUBCUTANEOUS at 08:05

## 2024-05-04 RX ADMIN — METRONIDAZOLE 500 MG: 500 INJECTION, SOLUTION INTRAVENOUS at 05:05

## 2024-05-04 RX ADMIN — ASPIRIN 81 MG CHEWABLE TABLET 81 MG: 81 TABLET CHEWABLE at 08:05

## 2024-05-04 RX ADMIN — METOPROLOL TARTRATE 12.5 MG: 25 TABLET, FILM COATED ORAL at 08:05

## 2024-05-04 RX ADMIN — INSULIN ASPART 2 UNITS: 100 INJECTION, SOLUTION INTRAVENOUS; SUBCUTANEOUS at 08:05

## 2024-05-04 RX ADMIN — METHOCARBAMOL 500 MG: 500 TABLET ORAL at 02:05

## 2024-05-04 RX ADMIN — APIXABAN 5 MG: 5 TABLET, FILM COATED ORAL at 08:05

## 2024-05-04 RX ADMIN — ATORVASTATIN CALCIUM 40 MG: 40 TABLET, FILM COATED ORAL at 08:05

## 2024-05-04 RX ADMIN — GABAPENTIN 600 MG: 300 CAPSULE ORAL at 08:05

## 2024-05-04 RX ADMIN — ONDANSETRON 4 MG: 2 INJECTION INTRAMUSCULAR; INTRAVENOUS at 10:05

## 2024-05-04 RX ADMIN — HYDROCODONE BITARTRATE AND ACETAMINOPHEN 1 TABLET: 10; 325 TABLET ORAL at 08:05

## 2024-05-04 RX ADMIN — POLYETHYLENE GLYCOL 3350 17 G: 17 POWDER, FOR SOLUTION ORAL at 08:05

## 2024-05-04 RX ADMIN — GABAPENTIN 600 MG: 300 CAPSULE ORAL at 02:05

## 2024-05-04 RX ADMIN — RISPERIDONE 3 MG: 1 TABLET, FILM COATED ORAL at 08:05

## 2024-05-04 RX ADMIN — METHOCARBAMOL 500 MG: 500 TABLET ORAL at 08:05

## 2024-05-04 RX ADMIN — PANTOPRAZOLE SODIUM 40 MG: 40 TABLET, DELAYED RELEASE ORAL at 08:05

## 2024-05-04 RX ADMIN — INSULIN ASPART 14 UNITS: 100 INJECTION, SOLUTION INTRAVENOUS; SUBCUTANEOUS at 12:05

## 2024-05-04 RX ADMIN — INSULIN ASPART 2 UNITS: 100 INJECTION, SOLUTION INTRAVENOUS; SUBCUTANEOUS at 12:05

## 2024-05-04 NOTE — PROGRESS NOTES
[facility] Medicine  Telemedicine Progress Note    Patient Name: Audrey Natarajan  MRN: [id number]  Patient Class: IP- Inpatient   Admission Date: 4/30/2024  Length of Stay: 3 days  Attending Physician: Shruthi Pagan MD  Primary Care Provider: Donaldo Pena MD          Subjective:     Principal Problem:Facial cellulitis        HPI:  This is a 53-year-old female with a past medical history of COPD, type 2 diabetes, hypertension, hyperlipidemia, peripheral artery disease, bipolar disorder, history of VTE (on Eliquis), tobacco use, SHAWN, s/p left BKA, gastroparesis who presents with facial rash.     Patient presents for evaluation of multiple bumps noted on her face, scalp, back and chest after getting bit by mosquitos. She reports getting bit by multiple mosquitos and gnats about a week and a half prior to presentation. She went to dermatology and got liquid nitrogen treatment and later developed a worsening pulsating frontal headache. She reports similar symptoms with prior episodes of cellulitis.     In the ED, the patient was hemodynamically stable.  Labs were remarkable for leukocytosis (23.3), hyperglycemia (418).  CT head showed no acute intracranial abnormality.  Chest x-ray showed no acute process.  Patient was given 1 L of LR, 1 L of NS, Toradol 15 mg IV, Zofran 4 mg IV, Tylenol 1 g p.o., insulin 60 units IV, and was started on vancomycin.  She was admitted for further management.    Overview/Hospital Course:  53-year-old female with a past medical history of COPD, type 2 diabetes, hypertension, hyperlipidemia, peripheral artery disease, bipolar disorder, history of VTE (on Eliquis), tobacco use, SHAWN, s/p left BKA, gastroparesis admitted on 04/30/2024 for facial cellulitis.  Presented with lesions on her face, scalp, back, and chest after multiple insect bite.  Sites with localized erythema, concerning for developing cellulitis.  Also found to have positive blood culture with  Gram-positive rods, indetification and sensitivities pending.  Repeat blood culture with no growth to date.  Urine culture with GBD.  CT head with no acute process.  Patient initiated on vancomycin. Patient also found to have hyperkalemia, hyperphosphatemia, hypomagnesemia.  Electrolytes replaced as needed.  Patient also with uncontrolled diabetes given hyperglycemia.  Recent A1c 11.  Continue insulin, titrate as needed.  Await for final blood cultures, consider Infectious Disease consult once cultures finalized.      This follow-up encounter was provided through telemedicine to address  Facial cellulitis present on admission.  Patient was transferred to the telemedicine service on:  05/03/2024   [facility] 4/30/2024  4:28 PM.      Interval History/Overnight Events:   Clinical record since admit reviewed.    Patient is able to provide adequate history.    Patient with numerous evident bites to head and neck; scalp remains with signicant itching and pain - erythema improving and itching improving but remains with headache; glucoses remain uncontrolled; blood culture with clostridium perfringens - she stated she did have diarrhea and epigastric abd pain before admit but no BM since admitted.  Repeat blood cx NG.     Review of Systems   Constitutional:  Positive for activity change and fatigue. Negative for fever.   Respiratory:  Negative for shortness of breath.    Gastrointestinal:  Negative for diarrhea and vomiting.   Skin:  Positive for wound.          I have reviewed the following on 05/03/2024:    Data  Details     [x]   Lab results reviewed   Wbc 11.2; Hgb 12.1; na 137; CO2 rising;     [x]   Micro reports reviewed  <50k group B strep to urine; blood cx as above    []   Pathology reports reviewed      []   Imaging reports reviewed      [x]   Cardiology Procedure reports reviewed  Echo - EF 55-60%; nl RV fxn    []   Non- records/CareEverywhere notes reviewed      [x]   Tests/studies orders placed or verified   BNP    []  Independently viewed/assessed       []  05/03/2024 Discussion of:        Inpatient Medications reviewed and prescribed for management of current problems:  Scheduled Meds:  Current Facility-Administered Medications   Medication Dose Route Frequency    apixaban  5 mg Oral BID    aspirin  81 mg Oral Daily    atorvastatin  40 mg Oral Daily    bumetanide  1 mg Oral Daily    divalproex  500 mg Oral QHS    gabapentin  600 mg Oral TID    insulin aspart U-100  14 Units Subcutaneous TID    insulin detemir U-100 (Levemir)  30 Units Subcutaneous BID    lisinopriL  10 mg Oral Daily    methocarbamoL  500 mg Oral TID    metoprolol tartrate  25 mg Oral BID    metronidazole  500 mg Intravenous Q8H    miconazole NITRATE 2 %   Topical (Top) BID    pantoprazole  40 mg Oral Daily    polyethylene glycol  17 g Oral BID    QUEtiapine  200 mg Oral QHS    risperiDONE  3 mg Oral BID    sodium chloride 0.9%  10 mL Intravenous Q6H    vancomycin (VANCOCIN) IV (PEDS and ADULTS)  1,500 mg Intravenous Q12H     Continuous Infusions:  Current Facility-Administered Medications   Medication Dose Route Frequency Last Rate Last Admin     PRN Meds:.  Current Facility-Administered Medications:     acetaminophen, 650 mg, Oral, Q8H PRN    acetaminophen, 650 mg, Oral, Q4H PRN    albuterol-ipratropium, 3 mL, Nebulization, Q4H PRN    aluminum-magnesium hydroxide-simethicone, 30 mL, Oral, QID PRN    bisacodyL, 10 mg, Rectal, Daily PRN    dextrose 10%, 12.5 g, Intravenous, PRN    dextrose 10%, 25 g, Intravenous, PRN    glucagon (human recombinant), 1 mg, Intramuscular, PRN    glucose, 16 g, Oral, PRN    glucose, 24 g, Oral, PRN    HYDROcodone-acetaminophen, 1 tablet, Oral, Q6H PRN    hydrOXYzine HCL, 50 mg, Oral, TID PRN    insulin aspart U-100, 0-5 Units, Subcutaneous, QID (AC + HS) PRN    magnesium oxide, 800 mg, Oral, PRN    magnesium oxide, 800 mg, Oral, PRN    melatonin, 6 mg, Oral, Nightly PRN     naloxone, 0.02 mg, Intravenous, PRN    ondansetron, 4 mg, Intravenous, Q8H PRN    potassium bicarbonate, 35 mEq, Oral, PRN    potassium bicarbonate, 50 mEq, Oral, PRN    potassium bicarbonate, 60 mEq, Oral, PRN    potassium, sodium phosphates, 2 packet, Oral, PRN    potassium, sodium phosphates, 2 packet, Oral, PRN    potassium, sodium phosphates, 2 packet, Oral, PRN    prochlorperazine, 5 mg, Intravenous, Q6H PRN    simethicone, 1 tablet, Oral, QID PRN    sodium chloride 0.9%, 10 mL, Intravenous, Q12H PRN    Flushing PICC/Midline Protocol, , , Until Discontinued **AND** sodium chloride 0.9%, 10 mL, Intravenous, Q6H **AND** sodium chloride 0.9%, 10 mL, Intravenous, PRN    Pharmacy to dose Vancomycin consult, , , Once **AND** vancomycin - pharmacy to dose, , Intravenous, pharmacy to manage frequency      Objective:     Temp:  [97.8 °F (36.6 °C)-98.5 °F (36.9 °C)] 98 °F (36.7 °C)  Pulse:  [67-77] 75  Resp:  [16-18] 18  SpO2:  [86 %-98 %] 94 %  BP: (105-140)/(53-70) 105/53      Intake/Output Summary (Last 24 hours) at 5/3/2024 1914  Last data filed at 5/3/2024 1805  Gross per 24 hour   Intake 1425.8 ml   Output --   Net 1425.8 ml        Body mass index is 39.85 kg/m².    Physical Exam  Vitals and nursing note reviewed.   Constitutional:       General: She is not in acute distress.     Appearance: She is obese. She is ill-appearing.   HENT:      Head: Normocephalic and atraumatic.   Cardiovascular:      Rate and Rhythm: Normal rate.   Pulmonary:      Effort: Pulmonary effort is normal. No tachypnea or respiratory distress.   Musculoskeletal:      Right Lower Extremity: Right leg is amputated below knee.   Skin:     Comments: Numerous hyperpigmented papules to face with surrounding erythema    Neurological:      General: No focal deficit present.      Mental Status: She is alert. Mental status is at baseline.      Motor: Weakness (generalized) present.   Psychiatric:         Attention and Perception: Attention normal.          Mood and Affect: Affect is flat.         Speech: Speech normal.         Behavior: Behavior is cooperative.          Labs: All labs within the last 24 hours were reviewed.   Recent Results (from the past 24 hour(s))   POCT glucose    Collection Time: 05/02/24  8:17 PM   Result Value Ref Range    POCT Glucose 377 (H) 70 - 110 mg/dL   CBC Auto Differential    Collection Time: 05/03/24  4:06 AM   Result Value Ref Range    WBC 11.28 3.90 - 12.70 K/uL    RBC 4.41 4.00 - 5.40 M/uL    Hemoglobin 12.1 12.0 - 16.0 g/dL    Hematocrit 38.3 37.0 - 48.5 %    MCV 87 82 - 98 fL    MCH 27.4 27.0 - 31.0 pg    MCHC 31.6 (L) 32.0 - 36.0 g/dL    RDW 14.2 11.5 - 14.5 %    Platelets 363 150 - 450 K/uL    MPV 10.7 9.2 - 12.9 fL    Immature Granulocytes 1.0 (H) 0.0 - 0.5 %    Gran # (ANC) 2.9 1.8 - 7.7 K/uL    Immature Grans (Abs) 0.11 (H) 0.00 - 0.04 K/uL    Lymph # 6.1 (H) 1.0 - 4.8 K/uL    Mono # 1.3 (H) 0.3 - 1.0 K/uL    Eos # 0.7 (H) 0.0 - 0.5 K/uL    Baso # 0.16 0.00 - 0.20 K/uL    nRBC 0 0 /100 WBC    Gran % 25.3 (L) 38.0 - 73.0 %    Lymph % 54.4 (H) 18.0 - 48.0 %    Mono % 11.4 4.0 - 15.0 %    Eosinophil % 6.5 0.0 - 8.0 %    Basophil % 1.4 0.0 - 1.9 %    Differential Method Automated    Basic Metabolic Panel    Collection Time: 05/03/24  4:06 AM   Result Value Ref Range    Sodium 137 136 - 145 mmol/L    Potassium 4.6 3.5 - 5.1 mmol/L    Chloride 94 (L) 95 - 110 mmol/L    CO2 33 (H) 23 - 29 mmol/L    Glucose 320 (H) 70 - 110 mg/dL    BUN 17 6 - 20 mg/dL    Creatinine 1.0 0.5 - 1.4 mg/dL    Calcium 9.1 8.7 - 10.5 mg/dL    Anion Gap 10 8 - 16 mmol/L    eGFR >60 >60 mL/min/1.73 m^2   Magnesium    Collection Time: 05/03/24  4:06 AM   Result Value Ref Range    Magnesium 1.7 1.6 - 2.6 mg/dL   Phosphorus    Collection Time: 05/03/24  4:06 AM   Result Value Ref Range    Phosphorus 5.2 (H) 2.7 - 4.5 mg/dL   POCT glucose    Collection Time: 05/03/24  7:43 AM   Result Value Ref Range    POCT Glucose 276 (H) 70 - 110 mg/dL   POCT glucose     "Collection Time: 05/03/24 11:26 AM   Result Value Ref Range    POCT Glucose 324 (H) 70 - 110 mg/dL   POCT glucose    Collection Time: 05/03/24  3:50 PM   Result Value Ref Range    POCT Glucose 342 (H) 70 - 110 mg/dL        Lab Results   Component Value Date    [id number] Negative 04/30/2024       Recent Labs   Lab 05/01/24  0604 05/02/24  0656 05/03/24  0406   WBC 16.51* 11.50 11.28   LYMPH 24.2  4.0 39.0  4.5 54.4*  6.1*   HGB 12.1 12.4 12.1   HCT 38.1 39.3 38.3    397 363     Recent Labs   Lab 05/01/24  0604 05/02/24  0656 05/03/24  0406    137 137   K 5.2* 4.8 4.6   CL 98 97 94*   CO2 27 32* 33*   BUN 22* 16 17   CREATININE 0.8 0.8 1.0   * 318* 320*   CALCIUM 8.9 9.1 9.1   MG 1.3* 1.7 1.7   PHOS 5.8* 5.0* 5.2*     Recent Labs   Lab 05/01/24  0604   ALKPHOS 68   ALT 14   AST 14   ALBUMIN 2.9*   PROT 5.4*   BILITOT 0.3        No results for input(s): "DDIMER", "FERRITIN", "CRP", "LDH", "BNP", "TROPONINI", "CPK" in the last 72 hours.    Invalid input(s): "PROCALCITONIN"        Microbiology: All microbiology updates for the past 24 hours have been reviewed.  Microbiology Results (last 7 days)       Procedure Component Value Units Date/Time    Blood culture #1 **CANNOT BE ORDERED STAT** [[id number]]  (Abnormal) Collected: 04/30/24 2127    Order Status: Completed Specimen: Blood from Peripheral, Forearm, Left Updated: 05/03/24 1239     Blood Culture, Routine Gram stain abbe bottle: Gram positive rods      Results called to and read back by: Lorrie Ritter 05/01/2024  07:54      CLOSTRIDIUM PERFRINGENS    Blood culture [[id number]] Collected: 05/01/24 0918    Order Status: Completed Specimen: Blood from Peripheral, Antecubital, Left Updated: 05/03/24 1103     Blood Culture, Routine No Growth to date      No Growth to date      No Growth to date    Blood culture [[id number]] Collected: 05/01/24 0912    Order Status: Completed Specimen: Blood from Peripheral, Antecubital, Right Updated: 05/03/24 " 1103     Blood Culture, Routine No Growth to date      No Growth to date      No Growth to date    Urine culture [[id number]]  (Abnormal) Collected: 05/01/24 0414    Order Status: Completed Specimen: Urine Updated: 05/03/24 0755     Urine Culture, Routine STREPTOCOCCUS AGALACTIAE (GROUP B)  50,000 - 99,999 cfu/ml  In case of Penicillin allergy, call lab for further testing.  Beta-hemolytic streptococci are routinely susceptible to   penicillins,cephalosporins and carbapenems.      Narrative:      Specimen Source->Urine    Blood culture #2 **CANNOT BE ORDERED STAT** [[id number]] Collected: 04/30/24 2136    Order Status: Completed Specimen: Blood from Peripheral, Antecubital, Right Updated: 05/03/24 0303     Blood Culture, Routine No Growth to date      No Growth to date      No Growth to date    Rapid Organism ID by PCR (from Blood culture) [[id number]] Collected: 04/30/24 2127    Order Status: Completed Updated: 05/02/24 1830     Enterococcus faecalis Not Detected     Enterococcus faecium Not Detected     Listeria monocytogenes Not Detected     Staphylococcus spp. Not Detected     Staphylococcus aureus Not Detected     Staphylococcus epidermidis Not Detected     Staphylococcus lugdunensis Not Detected     Streptococcus species Not Detected     Streptococcus agalactiae Not Detected     Streptococcus pneumoniae Not Detected     Streptococcus pyogenes Not Detected     Acinetobacter calcoaceticus/baumannii complex Not Detected     Bacteroides fragilis Not Detected     Enterobacterales Not Detected     Enterobacter cloacae complex Not Detected     Escherichia coli Not Detected     Klebsiella aerogenes Not Detected     Klebsiella oxytoca Not Detected     Klebsiella pneumoniae group Not Detected     Proteus Not Detected     Salmonella sp Not Detected     Serratia marcescens Not Detected     Haemophilus influenzae Not Detected     Neisseria meningtidis Not Detected     Pseudomonas aeruginosa Not Detected      Stenotrophomonas maltophilia Not Detected     Candida albicans Not Detected     Candida auris Not Detected     Candida glabrata Not Detected     Candida krusei Not Detected     Candida parapsilosis Not Detected     Candida tropicalis Not Detected     Cryptococcus neoformans/gattii Not Detected     CTX-M (ESBL ) Test Not Applicable     IMP (Carbapenem resistant) Test Not Applicable     KPC resistance gene (Carbapenem resistant) Test Not Applicable     mcr-1  Test Not Applicable     mec A/C  Test Not Applicable     mec A/C and MREJ (MRSA) gene Test Not Applicable     NDM (Carbapenem resistant) Test Not Applicable     OXA-48-like (Carbapenem resistant) Test Not Applicable     van A/B (VRE gene) Test Not Applicable     VIM (Carbapenem resistant) Test Not Applicable              Imaging All imaging within the last 24 hours was reviewed.       Results for orders placed during the hospital encounter of 04/30/24    Echo    Interpretation Summary    Left Ventricle: The left ventricle is normal in size. Normal wall thickness. There is normal systolic function with a visually estimated ejection fraction of 55 - 60%.    Right Ventricle: Normal right ventricular cavity size. Systolic function is normal.    Left Atrium: Left atrium is mildly dilated.    Pulmonary Artery: The estimated pulmonary artery systolic pressure is 21 mmHg.    IVC/SVC: Normal venous pressure at 3 mmHg.      Echo    Left Ventricle: The left ventricle is normal in size. Normal wall   thickness. There is normal systolic function with a visually estimated   ejection fraction of 55 - 60%.    Right Ventricle: Normal right ventricular cavity size. Systolic   function is normal.    Left Atrium: Left atrium is mildly dilated.    Pulmonary Artery: The estimated pulmonary artery systolic pressure is   21 mmHg.    IVC/SVC: Normal venous pressure at 3 mmHg.           Assessment/Plan:      * Facial cellulitis  Presents with facial/scalp, back and chest rash  after an insect bite  Multiple sites with localized erythema. Concerning for developing cellulitis.   CT head showed no acute process   Continue vancomycin       Metabolic alkalosis  CO2 increasing on labs.  Minimize oxygen therapy.  Patient with chronic diuretic therapy and high glucoses suspicious for volume depletion; will check BNP; gentle IVF.  Continue to monitor.       Hypomagnesemia  Patient has Abnormal Magnesium: hypomagnesemia. Will continue to monitor electrolytes closely. Will replace the affected electrolytes and repeat labs to be done after interventions completed. The patient's magnesium results have been reviewed and are listed below.  Recent Labs   Lab 05/03/24  0406   MG 1.7          -Mg level 1.3 on admission  -replace as needed    Bacteremia  -blood cultures from admission on 04/30 with Clostridium perfringens  -she reported diarrhea/abd pain before admit but none since admitted  -repeat blood culture from 05/01 with no growth to date   -urine culture with Streptococcus B  -echocardiogram ordered - no obvious valvular dz  -called microbiology lab on 5/2 who confirmed it is not a contaminant.  -continue vancomycin; add flagyl  -Infectious Disease consulted     PAD (peripheral artery disease)  History noted.       Chronic deep vein thrombosis (DVT) of both lower extremities  Continue Eliquis       SHAWN (obstructive sleep apnea)  Not on CPAP. Outpatient follow up with sleep medicine     History of pulmonary embolism  Continue Eliquis       Class 2 severe obesity with serious comorbidity and body mass index (BMI) of 37.0 to 37.9 in adult  Body mass index is 39.85 kg/m². Morbid obesity complicates all aspects of disease management from diagnostic modalities to treatment. Weight loss encouraged and health benefits explained to patient.         Gastroparesis due to DM  History noted.   Denies symptoms currently.  Glycemic control    Bipolar 1 disorder  Continue home medications:  Risperidone 3 mg  b.i.d., divalproex 500 mg nightly, Seroquel 200 mg nightly    Uncontrolled diabetes mellitus with hyperglycemia, with long-term current use of insulin  A1c:   Lab Results   Component Value Date    HGBA1C 11.0 (H) 04/22/2024     Meds: basal bolus insulin + SSI PRN to maintain goal 140-180  ADA diet, accuchecks ACHS, hypoglycemic protocol  -home regimen as follows:  Levemir 15 units b.i.d., NovoLog 5 units 3 times daily  -continues to have hyperglycemia, likely uncontrolled given A1c 11 and also exacerbated in setting of infection  -titrate insulin as needed  -current regimen while inpatient:  increased to Basal insulin at 30 units b.i.d., prandial insulin 14 units TIDWM.   -continue SSI      Hyperlipidemia  Continue statin       COPD (chronic obstructive pulmonary disease)  Patient's COPD is controlled currently.  Patient is currently off COPD Pathway.   Duonebs PRN    Essential hypertension  Chronic, controlled.     Latest blood pressure and vitals reviewed-     Temp:  [97.8 °F (36.6 °C)-98.5 °F (36.9 °C)]   Pulse:  [67-77]   Resp:  [16-18]   BP: (105-140)/(53-70)   SpO2:  [86 %-98 %] .   Home meds for hypertension were reviewed and noted below.   Hypertension Medications               bumetanide (BUMEX) 1 MG tablet Take 1 tablet (1 mg total) by mouth once daily.    lisinopriL 10 MG tablet Take 1 tablet (10 mg total) by mouth once daily.    metoprolol tartrate (LOPRESSOR) 25 MG tablet Take 1 tablet (25 mg total) by mouth 2 (two) times daily.            While in the hospital, will manage blood pressure as follows; Continue home antihypertensive regimen    Will utilize p.r.n. blood pressure medication only if patient's blood pressure greater than 180/110 and she develops symptoms such as worsening chest pain or shortness of breath.      VTE Risk Mitigation (From admission, onward)           Ordered     apixaban tablet 5 mg  2 times daily         05/01/24 0311     IP VTE HIGH RISK PATIENT  Once         05/01/24 9694      Place sequential compression device  Until discontinued         05/01/24 0256                    High Risk Conditions:  Patient is currently on drug therapy requiring intensive monitoring for toxicity: Vancomycin        I have completed this tele-visit without the assistance of a telepresenter.    The attending portion of this evaluation, treatment, and documentation was performed per Shruthi Pagan MD via Telemedicine AudioVisual using the secure [employer] software platform with 2 way audio/video. The provider was located off-site and the patient is located in the hospital. The aforementioned video software was utilized to document the relevant history and physical exam    Shruthi Pagan MD  Department of Hospital Medicine   [facility]

## 2024-05-04 NOTE — PROGRESS NOTES
Pharmacokinetic Assessment Follow Up: IV Vancomycin    Vancomycin serum concentration assessment(s):    The trough level was drawn correctly and can be used to guide therapy at this time. The measurement is within the desired definitive target range of 10 to 20 mcg/mL.    Vancomycin Regimen Plan:    Continue regimen to Vancomycin 1500 mg IV every 12 hours with next serum trough concentration measured at 0900 prior to AM dose on 5/6    Drug levels (last 3 results):  Recent Labs   Lab Result Units 05/02/24  1043 05/03/24  2240   Vancomycin-Trough ug/mL 17.1 15.1       Pharmacy will continue to follow and monitor vancomycin.    Please contact pharmacy at extension [phone] for questions regarding this assessment.    Thank you for the consult,   Barrera Arredondo       Patient brief summary:  Audrey Natarajan is a 53 y.o. female initiated on antimicrobial therapy with IV Vancomycin for treatment of skin & soft tissue infection    Drug Allergies:   Review of patient's allergies indicates:   Allergen Reactions    Morphine Other (See Comments)     Patient had a psychotic episode after taking Morphine  Agitation, hallucinations  Other Reaction(s): Other (See Comments), Other (See Comments)    Patient had a psychotic episode after taking Morphine    Patient had a psychotic episode after taking Morphine Agitation, hallucinations    Penicillins Anaphylaxis     Tolerated cephalosporins in the past    Januvia [sitagliptin] Hives    Carbamazepine Other (See Comments)     hyponatremia  Other Reaction(s): Other (See Comments)    hyponatremia       Actual Body Weight:   112 kg    Renal Function:   Estimated Creatinine Clearance: 84.5 mL/min (based on SCr of 1 mg/dL).,     Dialysis Method (if applicable):  N/A    CBC (last 72 hours):  Recent Labs   Lab Result Units 05/01/24  0604 05/02/24  0656 05/03/24  0406   WBC K/uL 16.51* 11.50 11.28   Hemoglobin g/dL 12.1 12.4 12.1   Hematocrit % 38.1 39.3 38.3   Platelets K/uL 382 397 363    Gran % % 59.7 42.5 25.3*   Lymph % % 24.2 39.0 54.4*   Mono % % 12.3 12.0 11.4   Eosinophil % % 2.6 4.8 6.5   Basophil % % 0.7 0.9 1.4   Differential Method  Automated Automated Automated       Metabolic Panel (last 72 hours):  Recent Labs   Lab Result Units 05/01/24  0414 05/01/24  0604 05/02/24  0656 05/03/24  0406   Sodium mmol/L  --  136 137 137   Potassium mmol/L  --  5.2* 4.8 4.6   Chloride mmol/L  --  98 97 94*   CO2 mmol/L  --  27 32* 33*   Glucose mg/dL  --  387* 318* 320*   Glucose, UA  4+*  --   --   --    BUN mg/dL  --  22* 16 17   Creatinine mg/dL  --  0.8 0.8 1.0   Albumin g/dL  --  2.9*  --   --    Total Bilirubin mg/dL  --  0.3  --   --    Alkaline Phosphatase U/L  --  68  --   --    AST U/L  --  14  --   --    ALT U/L  --  14  --   --    Magnesium mg/dL  --  1.3* 1.7 1.7   Phosphorus mg/dL  --  5.8* 5.0* 5.2*       Vancomycin Administrations:  vancomycin given in the last 96 hours                     vancomycin 1,500 mg in dextrose 5 % (D5W) 250 mL IVPB (Vial-Mate) (mg) 1,500 mg New Bag 05/03/24 2342     1,500 mg New Bag  1050     1,500 mg New Bag 05/02/24 2143    vancomycin (VANCOCIN) 1,750 mg in dextrose 5 % (D5W) 500 mL IVPB (mg) 1,750 mg New Bag 05/02/24 1047     1,750 mg New Bag 05/01/24 2300     1,750 mg New Bag  0942    vancomycin (VANCOCIN) 2,000 mg in sodium chloride 0.9% 500 mL IVPB (mg) 2,000 mg New Bag 04/30/24 2201                    Microbiologic Results:  Microbiology Results (last 7 days)       Procedure Component Value Units Date/Time    Blood culture #1 **CANNOT BE ORDERED STAT** [[id number]]  (Abnormal) Collected: 04/30/24 2127    Order Status: Completed Specimen: Blood from Peripheral, Forearm, Left Updated: 05/03/24 1239     Blood Culture, Routine Gram stain abbe bottle: Gram positive rods      Results called to and read back by: Lorrie Ritter 05/01/2024  07:54      CLOSTRIDIUM PERFRINGENS    Blood culture [[id number]] Collected: 05/01/24 0918    Order Status: Completed  Specimen: Blood from Peripheral, Antecubital, Left Updated: 05/03/24 1103     Blood Culture, Routine No Growth to date      No Growth to date      No Growth to date    Blood culture [[id number]] Collected: 05/01/24 0912    Order Status: Completed Specimen: Blood from Peripheral, Antecubital, Right Updated: 05/03/24 1103     Blood Culture, Routine No Growth to date      No Growth to date      No Growth to date    Urine culture [[id number]]  (Abnormal) Collected: 05/01/24 0414    Order Status: Completed Specimen: Urine Updated: 05/03/24 0755     Urine Culture, Routine STREPTOCOCCUS AGALACTIAE (GROUP B)  50,000 - 99,999 cfu/ml  In case of Penicillin allergy, call lab for further testing.  Beta-hemolytic streptococci are routinely susceptible to   penicillins,cephalosporins and carbapenems.      Narrative:      Specimen Source->Urine    Blood culture #2 **CANNOT BE ORDERED STAT** [[id number]] Collected: 04/30/24 2136    Order Status: Completed Specimen: Blood from Peripheral, Antecubital, Right Updated: 05/03/24 0303     Blood Culture, Routine No Growth to date      No Growth to date      No Growth to date    Rapid Organism ID by PCR (from Blood culture) [[id number]] Collected: 04/30/24 2127    Order Status: Completed Updated: 05/02/24 1830     Enterococcus faecalis Not Detected     Enterococcus faecium Not Detected     Listeria monocytogenes Not Detected     Staphylococcus spp. Not Detected     Staphylococcus aureus Not Detected     Staphylococcus epidermidis Not Detected     Staphylococcus lugdunensis Not Detected     Streptococcus species Not Detected     Streptococcus agalactiae Not Detected     Streptococcus pneumoniae Not Detected     Streptococcus pyogenes Not Detected     Acinetobacter calcoaceticus/baumannii complex Not Detected     Bacteroides fragilis Not Detected     Enterobacterales Not Detected     Enterobacter cloacae complex Not Detected     Escherichia coli Not Detected     Klebsiella aerogenes Not  Detected     Klebsiella oxytoca Not Detected     Klebsiella pneumoniae group Not Detected     Proteus Not Detected     Salmonella sp Not Detected     Serratia marcescens Not Detected     Haemophilus influenzae Not Detected     Neisseria meningtidis Not Detected     Pseudomonas aeruginosa Not Detected     Stenotrophomonas maltophilia Not Detected     Candida albicans Not Detected     Candida auris Not Detected     Candida glabrata Not Detected     Candida krusei Not Detected     Candida parapsilosis Not Detected     Candida tropicalis Not Detected     Cryptococcus neoformans/gattii Not Detected     CTX-M (ESBL ) Test Not Applicable     IMP (Carbapenem resistant) Test Not Applicable     KPC resistance gene (Carbapenem resistant) Test Not Applicable     mcr-1  Test Not Applicable     mec A/C  Test Not Applicable     mec A/C and MREJ (MRSA) gene Test Not Applicable     NDM (Carbapenem resistant) Test Not Applicable     OXA-48-like (Carbapenem resistant) Test Not Applicable     van A/B (VRE gene) Test Not Applicable     VIM (Carbapenem resistant) Test Not Applicable

## 2024-05-04 NOTE — ASSESSMENT & PLAN NOTE
-blood cultures from admission on 04/30 with Clostridium perfringens  -she reported diarrhea/abd pain before admit but none since admitted  -repeat blood culture from 05/01 with no growth to date   -urine culture with Streptococcus B  -echocardiogram ordered - no obvious valvular dz  -called microbiology lab on 5/2 who confirmed it is not a contaminant.  -continue vancomycin; add flagyl  -Infectious Disease consulted

## 2024-05-04 NOTE — CONSULTS
"[facility]  Infectious Disease  Consult Note    Patient Name: Audrey Natarajan  MRN: [id number]  Admission Date: 4/30/2024  Hospital Length of Stay: 3 days  Attending Physician: Shruthi Pagan MD  Primary Care Provider: Donaldo Pena MD     Isolation Status: No active isolations    Patient information was obtained from patient and ER records.      Inpatient consult to Infectious Diseases  Consult performed by: Beverly Zavala MD  Consult ordered by: Shruthi Pagan MD        Assessment/Plan:     ID  Bacteremia  53F with h/o tobacco abuse, bipolar, htn, well known to ID from h/o recurrent foot infections, now s/p L BKA admitted 4/30 with bumps on face, scalp, back after being bit by mosquitos/gnats.   Bcx from admit resulting with 1 of 2 with clostridium perfringens and flayl added. UA 28 wbc, 3 squam. Ucx with GBS. ID consulted for "clostridium perfringens bacteremia     Surrounding erythema seems to have resolved on 4 days vancomycin. No systemic signs of infection. No areas of fluctuance or purulence.    Pt without urinary symptoms and low leukocyte count and sample contaminated with squam present. Do not recommend treating asymptomatic bacteruria    Unclear etiology of the clostridium perfringens +bcx. It's possible it's a contaminant, but would err or side of treating. Mosquito bites without any necrosis and acute infection seems to have resolved and do not suspect. GI source most likely. I do not see that she's had a colonoscopy before. She does carry h/o fibrosis F2 and diverticulosis which could have contributed to bacteremia. Also note h/o splenectomy.     recommendations:   - the IV flagyl  500mg tid can be changed to po. Would plan on 2 weeks treatment (est end date 5/16)  - consider stopping vanc  - consider CT abdomen/pelvis to eval source  - referral for outpatient colonoscopy  - needs post-splenectomy vaccines reviewed/given. Please arrange for ID f/u outpatient for " "vaccines    Follow-up appointment will be arranged by the ID clinic and will be found in the patient's appointments tab.    Prior to discharge, please ensure the patient's follow-up has been scheduled.    If there is still no follow-up scheduled prior to discharge, please send an EPIC message to Rosemary Balderas in Infectious Diseases.                  Thank you for your consult. I will follow-up with patient. Please contact us if you have any additional questions.    Beverly Zavala MD  Infectious Disease  [facility] [place] - Med Surg    Subjective:     Principal Problem: Facial cellulitis    HPI: 53F with h/o tobacco abuse, bipolar, htn, well known to ID from h/o recurrent foot infections, now s/p L BKA admitted 4/30 with bumps on face, scalp, back. Pt reports she was outside and got numerous bites from mosquitos. Worried she had cellulitis and came to hospital. Denies f/c. Has been having abdominal tenderness and some diarrhea, now constipation recently. Noticed improvement in bumps since being in the hospital. Says she's waiting on her prosthetic leg to have some adjustments made and not currently using it. Denies dysuria. Denies any purulent drainage from wounds. Reports utd on colonoscopy, but doesn't recall when (I see egd done 2014, but no colonoscopy results available to review)        Bcx 1 of 2 with   Component 3 d ago   Blood Culture, Routine Gram stain abbe bottle: Gram positive rods   Blood Culture, Routine Results called to and read back by: Lorrie Ritter 05/01/2024  07:54   Blood Culture, Routine CLOSTRIDIUM PERFRINGENS Abnormal        UA 28 wbc, 3 squam    Ucx with GBS    Day 4 vanc. Flagyl just added      Afebrile      ID consulted for "clostridium perfringens bacteremia     Past Medical History:   Diagnosis Date    ADHD (attention deficit hyperactivity disorder)     Arthritis     Asthma     Bipolar 1 disorder     Cataract     Cigarette smoker     COPD (chronic obstructive pulmonary disease)     Coronary " "artery disease     A fib    Depression     bipolar manic depresson    Diabetes mellitus     Diabetic foot ulcers     Diabetic neuropathy     DVT of lower extremity, bilateral 07/2013    bilateral LE DVT. Estelita filter placed.     Encounter for blood transfusion     History of blood clots 1. Left Leg=2003; 2.Bilateral Groin=Blood Clots= 5 or 6/ 2013 & 7/2013; 3. LLL of Lung=7/2013;  4. Lt. Lower Leg=7/2013.     Pt. had 1st Blood Clot after Dktcohvoojaw=9011, & Last=2013. [place] Filter= Rt.Lateral Neck.    HTN (hypertension) 06/06/2013    Pt states that she does not have hypertension    Hypercholesteremia     Irregular heartbeat     Neuromuscular disorder     neuropathy feet    Obese     PE (pulmonary embolism) 07/2013    bilat LE DVT.     Restless leg syndrome        Past Surgical History:   Procedure Laterality Date    ABDOMINAL SURGERY  2010    gastric sleeve    BELOW KNEE AMPUTATION OF LOWER EXTREMITY Left 4/19/2023    Procedure: AMPUTATION, BELOW KNEE;  Surgeon: Gabe Munoz MD;  Location: [facility] OR;  Service: General;  Laterality: Left;  RN PREOP 4/11/2023    BILATERAL OOPHORECTOMY Bilateral 1/12/2015    CHOLECYSTECTOMY      DEBRIDEMENT OF FOOT Bilateral 5/10/2022    Procedure: DEBRIDEMENT, FOOT;  Surgeon: Maira De Los Santos DPM;  Location: [facility] OR;  Service: Podiatry;  Laterality: Bilateral;    DEBRIDEMENT OF FOOT Left 2/28/2023    Procedure: DEBRIDEMENT, FOOT,biopsy;  Surgeon: Maira De Los Santos DPM;  Location: [facility] OR;  Service: Podiatry;  Laterality: Left;  request misonix, wound VAC, possible neoxx    Green' s filter Right 7/4/2012    Right Neck & Tunneled Down.    HERNIA REPAIR      "[place] of Hernias Repaires around th Belly Button.", pt. states    INCISION AND DRAINAGE FOOT Left 12/24/2022    Procedure: INCISION AND DRAINAGE, FOOT;  Surgeon: Fahad Razo DPM;  Location: [facility] OR;  Service: Podiatry;  Laterality: Left;    LAPAROSCOPIC CHOLECYSTECTOMY N/A 9/10/2020    Procedure: CHOLECYSTECTOMY, " LAPAROSCOPIC;  Surgeon: Montrell Gutierrez MD;  Location: [facility];  Service: General;  Laterality: N/A;  RN PREOP 9/9----COVID Negative  9/9    OVARIAN CYST REMOVAL  3/13/2014    OR REMOVAL OF OVARY/TUBE(S)      SPLENECTOMY, TOTAL  July 2003    TONSILLECTOMY      as a child    TYMPANOSTOMY TUBE PLACEMENT  1976    VEIN SURGERY  2003    Lt leg       Review of patient's allergies indicates:   Allergen Reactions    Morphine Other (See Comments)     Patient had a psychotic episode after taking Morphine  Agitation, hallucinations  Other Reaction(s): Other (See Comments), Other (See Comments)    Patient had a psychotic episode after taking Morphine    Patient had a psychotic episode after taking Morphine Agitation, hallucinations    Penicillins Anaphylaxis     Tolerated cephalosporins in the past    Januvia [sitagliptin] Hives    Carbamazepine Other (See Comments)     hyponatremia  Other Reaction(s): Other (See Comments)    hyponatremia       Current Facility-Administered Medications   Medication Dose Route Frequency Provider Last Rate Last Admin    0.9%  NaCl infusion   Intravenous Continuous Shruthi Pagan MD        acetaminophen tablet 650 mg  650 mg Oral Q8H PRN Shruthi Pagan MD        albuterol-ipratropium 2.5 mg-0.5 mg/3 mL nebulizer solution 3 mL  3 mL Nebulization Q4H PRN Favian Mcdaniel MD        aluminum-magnesium hydroxide-simethicone 200-200-20 mg/5 mL suspension 30 mL  30 mL Oral QID PRN Favian Mcdaniel MD        apixaban tablet 5 mg  5 mg Oral BID Favian Mcdaniel MD   5 mg at 05/03/24 2209    aspirin chewable tablet 81 mg  81 mg Oral Daily Favian Mcdaniel MD   81 mg at 05/03/24 0933    atorvastatin tablet 40 mg  40 mg Oral Daily Favian Mcdaniel MD   40 mg at 05/03/24 0933    bisacodyL suppository 10 mg  10 mg Rectal Daily PRN Favian Mcdaniel MD   10 mg at 05/03/24 1431    [START ON 5/5/2024] bumetanide tablet 1 mg  1 mg Oral Daily Shruthi Pagan MD        dextrose 10% bolus 125 mL 125 mL  12.5 g Intravenous PRN  Favian Mcdaniel MD        dextrose 10% bolus 250 mL 250 mL  25 g Intravenous PRN Favian Mcdaniel MD        divalproex EC tablet 500 mg  500 mg Oral QHS Favian Mcdaniel MD   500 mg at 05/03/24 2209    gabapentin capsule 600 mg  600 mg Oral TID Favian Mcdaniel MD   600 mg at 05/03/24 2210    glucagon (human recombinant) injection 1 mg  1 mg Intramuscular PRN Favian Mcdaniel MD        glucose chewable tablet 16 g  16 g Oral NIDHIN Favian Mcdaniel MD        glucose chewable tablet 24 g  24 g Oral PRN Favian Mcdaniel MD        HYDROcodone-acetaminophen  mg per tablet 1 tablet  1 tablet Oral Q6H PRN Shruthi Pagan MD        hydrOXYzine HCL tablet 50 mg  50 mg Oral TID PRN Favain Mcdaniel MD   50 mg at 05/02/24 2123    insulin aspart U-100 pen 0-5 Units  0-5 Units Subcutaneous QID (AC + HS) Favian Bailon MD   2 Units at 05/03/24 2220    insulin aspart U-100 pen 14 Units  14 Units Subcutaneous TID Shruthi Pagan MD   14 Units at 05/03/24 2221    insulin detemir U-100 (Levemir) pen 30 Units  30 Units Subcutaneous BID Shruthi Pagan MD   30 Units at 05/03/24 2219    [START ON 5/4/2024] lisinopriL tablet 10 mg  10 mg Oral QHS Shruthi Pagan MD        magnesium oxide tablet 800 mg  800 mg Oral PRN Favian Mcdaniel MD        magnesium oxide tablet 800 mg  800 mg Oral PRN Favian Mcdaniel MD        melatonin tablet 6 mg  6 mg Oral Nightly Favian Bailon MD   6 mg at 05/02/24 2122    methocarbamoL tablet 500 mg  500 mg Oral TID Jennifer Garcia PA-C   500 mg at 05/03/24 2210    metoprolol tartrate (LOPRESSOR) split tablet 12.5 mg  12.5 mg Oral BID Shruthi Pagan MD   12.5 mg at 05/03/24 2210    metronidazole IVPB 500 mg  500 mg Intravenous Q8H Shruthi Pagan  mL/hr at 05/03/24 2151 500 mg at 05/03/24 2151    miconazole NITRATE 2 % top powder   Topical (Top) BID Favian Mcdaniel MD   Given at 05/02/24 2140    naloxone 0.4 mg/mL injection 0.02 mg  0.02 mg Intravenous PRN Favian Mcdaniel MD        ondansetron injection 4 mg  4  mg Intravenous Q8H PRN Favian Mcdaniel MD   4 mg at 05/03/24 1049    pantoprazole EC tablet 40 mg  40 mg Oral Daily Favian Mcdaniel MD   40 mg at 05/03/24 0932    polyethylene glycol packet 17 g  17 g Oral BID Shruthi Pagan MD   17 g at 05/03/24 2210    potassium bicarbonate disintegrating tablet 35 mEq  35 mEq Oral PRN Favian Mcdaniel MD        potassium bicarbonate disintegrating tablet 50 mEq  50 mEq Oral PRN Favian Mcdaniel MD        potassium bicarbonate disintegrating tablet 60 mEq  60 mEq Oral PRN Favian Mcdaniel MD        potassium, sodium phosphates 280-160-250 mg packet 2 packet  2 packet Oral PRN Favian Mcdaniel MD        potassium, sodium phosphates 280-160-250 mg packet 2 packet  2 packet Oral PRN Favian Mcdaniel MD        potassium, sodium phosphates 280-160-250 mg packet 2 packet  2 packet Oral PRN Favian Mcdaniel MD        prochlorperazine injection Soln 5 mg  5 mg Intravenous Q6H PRN Favian Mcdaniel MD        QUEtiapine tablet 200 mg  200 mg Oral QHS Reggie Dick MD   200 mg at 05/03/24 2209    risperiDONE tablet 3 mg  3 mg Oral BID Favian Mcdaniel MD   3 mg at 05/03/24 2210    simethicone chewable tablet 80 mg  1 tablet Oral QID PRN Favian Mcdaniel MD        sodium chloride 0.9% flush 10 mL  10 mL Intravenous Q12H PRN Favian Mcdaniel MD        sodium chloride 0.9% flush 10 mL  10 mL Intravenous Q6H Velvet Galicia, DO   10 mL at 05/03/24 1640    And    sodium chloride 0.9% flush 10 mL  10 mL Intravenous PRN Velvet Galicia T., DO        vancomycin - pharmacy to dose   Intravenous pharmacy to manage frequency Favian Mcdaniel MD        vancomycin 1,500 mg in dextrose 5 % (D5W) 250 mL IVPB (Vial-Mate)  1,500 mg Intravenous Q12H Velvet Galicia, DO   Stopped at 05/03/24 1220     Family History       Problem Relation (Age of Onset)    Cataracts Father    Diabetes Father, Paternal Grandfather    Heart disease Father, Paternal Grandfather    Hypertension Father    No Known Problems Mother, Sister, Brother, Maternal  Aunt, Maternal Uncle, Paternal Aunt, Paternal Uncle, Maternal Grandfather    Ovarian cancer Maternal Grandmother, Paternal Grandmother          Tobacco Use    Smoking status: Former     Current packs/day: 0.00     Average packs/day: 0.5 packs/day for 37.0 years (18.5 ttl pk-yrs)     Types: Cigarettes     Start date: 12/6/1983     Quit date: 12/6/2020     Years since quitting: 3.4    Smokeless tobacco: Current    Tobacco comments:     Enrolled in the [employer] on 5/3/14 ([facility] Member ID # [id number]). Ambulatory referral to Smoking Cessation Program   Substance and Sexual Activity    Alcohol use: No     Alcohol/week: 0.0 standard drinks of alcohol    Drug use: No    Sexual activity: Yes     Partners: Male     Review of Systems   Constitutional: Negative.    HENT: Negative.     Eyes: Negative.    Respiratory: Negative.     Cardiovascular: Negative.    Gastrointestinal: Negative.    Endocrine: Negative.    Genitourinary: Negative.    Musculoskeletal: Negative.    Skin:  Positive for rash.   Allergic/Immunologic: Negative.    Neurological:  Positive for headaches.   Psychiatric/Behavioral: Negative.       Objective:     Vital Signs (Most Recent):  Temp: 97.9 °F (36.6 °C) (05/03/24 1931)  Pulse: 80 (05/03/24 1931)  Resp: 19 (05/03/24 1931)  BP: 134/63 (05/03/24 1931)  SpO2: (!) 87 % (05/03/24 1931) Vital Signs (24h Range):  Temp:  [97.8 °F (36.6 °C)-98.5 °F (36.9 °C)] 97.9 °F (36.6 °C)  Pulse:  [67-80] 80  Resp:  [16-19] 19  SpO2:  [86 %-98 %] 87 %  BP: (105-140)/(53-70) 134/63     Weight: 112 kg (246 lb 14.4 oz)  Body mass index is 39.85 kg/m².     Physical Exam  Vitals and nursing note reviewed.   Constitutional:       General: She is not in acute distress.     Appearance: Normal appearance. She is obese. She is not ill-appearing.   HENT:      Head: Normocephalic and atraumatic.      Nose: Nose normal.      Mouth/Throat:      Mouth: Mucous membranes are moist.   Eyes:      Extraocular Movements: Extraocular  movements intact.   Cardiovascular:      Rate and Rhythm: Normal rate.      Pulses: Normal pulses.      Heart sounds: No murmur heard.  Pulmonary:      Effort: Pulmonary effort is normal. No respiratory distress.   Abdominal:      General: Abdomen is flat.      Palpations: Abdomen is soft.      Tenderness: There is no abdominal tenderness.   Musculoskeletal:      Right lower leg: No edema.      Comments: Left BKA   Skin:     General: Skin is warm.      Capillary Refill: Capillary refill takes less than 2 seconds.      Findings: Rash present. Rash is nodular and papular.      Comments: Present on her scalp, face, upper back bilaterally, and chest.    Neurological:      General: No focal deficit present.      Mental Status: She is alert.   Psychiatric:         Mood and Affect: Mood normal.                Significant Labs: All pertinent labs within the past 24 hours have been reviewed.    Significant Imaging: I have reviewed all pertinent imaging results/findings within the past 24 hours.

## 2024-05-04 NOTE — ASSESSMENT & PLAN NOTE
CO2 increasing on labs.  Minimize oxygen therapy.  Patient with chronic diuretic therapy and high glucoses suspicious for volume depletion; will check BNP; gentle IVF.  Continue to monitor.

## 2024-05-04 NOTE — ASSESSMENT & PLAN NOTE
"53F with h/o tobacco abuse, bipolar, htn, well known to ID from h/o recurrent foot infections, now s/p L BKA admitted 4/30 with bumps on face, scalp, back after being bit by mosquitos/gnats.   Bcx from admit resulting with 1 of 2 with clostridium perfringens and flayl added. UA 28 wbc, 3 squam. Ucx with GBS. ID consulted for "clostridium perfringens bacteremia     Surrounding erythema seems to have resolved on 4 days vancomycin. No systemic signs of infection. No areas of fluctuance or purulence.    Pt without urinary symptoms and low leukocyte count and sample contaminated with squam present. Do not recommend treating asymptomatic bacteruria    Unclear etiology of the clostridium perfringens +bcx. It's possible it's a contaminant, but would err or side of treating. Mosquito bites without any necrosis and acute infection seems to have resolved and do not suspect. GI source most likely. I do not see that she's had a colonoscopy before. She does carry h/o fibrosis F2 and diverticulosis which could have contributed to bacteremia. Also note h/o splenectomy.     recommendations:   - the IV flagyl  500mg tid can be changed to po. Would plan on 2 weeks treatment (est end date 5/16)  - consider stopping vanc  - consider CT abdomen/pelvis to eval source  - referral for outpatient colonoscopy  - needs post-splenectomy vaccines reviewed/given. Please arrange for ID f/u outpatient for vaccines    Follow-up appointment will be arranged by the ID clinic and will be found in the patient's appointments tab.    Prior to discharge, please ensure the patient's follow-up has been scheduled.    If there is still no follow-up scheduled prior to discharge, please send an EPIC message to Rosemary Balderas in Infectious Diseases.            "
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
## 2024-05-04 NOTE — SUBJECTIVE & OBJECTIVE
"Past Medical History:   Diagnosis Date    ADHD (attention deficit hyperactivity disorder)     Arthritis     Asthma     Bipolar 1 disorder     Cataract     Cigarette smoker     COPD (chronic obstructive pulmonary disease)     Coronary artery disease     A fib    Depression     bipolar manic depresson    Diabetes mellitus     Diabetic foot ulcers     Diabetic neuropathy     DVT of lower extremity, bilateral 07/2013    bilateral LE DVT. [place] filter placed.     Encounter for blood transfusion     History of blood clots 1. Left Leg=2003; 2.Bilateral Groin=Blood Clots= 5 or 6/ 2013 & 7/2013; 3. LLL of Lung=7/2013;  4. Lt. Lower Leg=7/2013.     Pt. had 1st Blood Clot after Xftnixdwppiv=[id number], & Last=2013. [place] Filter= Rt.Lateral Neck.    HTN (hypertension) 06/06/2013    Pt states that she does not have hypertension    Hypercholesteremia     Irregular heartbeat     Neuromuscular disorder     neuropathy feet    Obese     PE (pulmonary embolism) 07/2013    bilat LE DVT.     Restless leg syndrome        Past Surgical History:   Procedure Laterality Date    ABDOMINAL SURGERY  2010    gastric sleeve    BELOW KNEE AMPUTATION OF LOWER EXTREMITY Left 4/19/2023    Procedure: AMPUTATION, BELOW KNEE;  Surgeon: Gabe Munoz MD;  Location: [facility] OR;  Service: General;  Laterality: Left;  RN PREOP 4/11/2023    BILATERAL OOPHORECTOMY Bilateral 1/12/2015    CHOLECYSTECTOMY      DEBRIDEMENT OF FOOT Bilateral 5/10/2022    Procedure: DEBRIDEMENT, FOOT;  Surgeon: Maira De Los Santos DPM;  Location: [facility] OR;  Service: Podiatry;  Laterality: Bilateral;    DEBRIDEMENT OF FOOT Left 2/28/2023    Procedure: DEBRIDEMENT, FOOT,biopsy;  Surgeon: Maira De Los Santos DPM;  Location: [facility] OR;  Service: Podiatry;  Laterality: Left;  request misonix, wound VAC, possible neoxx    Green' s filter Right 7/4/2012    Right Neck & Tunneled Down.    HERNIA REPAIR      "[place] [facility] Hernias Repaires around th [place].", pt. states    INCISION AND DRAINAGE " FOOT Left 12/24/2022    Procedure: INCISION AND DRAINAGE, FOOT;  Surgeon: Fahad Razo DPM;  Location: [facility] OR;  Service: Podiatry;  Laterality: Left;    LAPAROSCOPIC CHOLECYSTECTOMY N/A 9/10/2020    Procedure: CHOLECYSTECTOMY, LAPAROSCOPIC;  Surgeon: Montrell Gutierrez MD;  Location: [facility] OR;  Service: General;  Laterality: N/A;  RN PREOP 9/9----COVID Negative  9/9    OVARIAN CYST REMOVAL  3/13/2014    DE REMOVAL OF OVARY/TUBE(S)      SPLENECTOMY, TOTAL  July 2003    TONSILLECTOMY      as a child    TYMPANOSTOMY TUBE PLACEMENT  1976    VEIN SURGERY  2003    Lt leg       Review of patient's allergies indicates:   Allergen Reactions    Morphine Other (See Comments)     Patient had a psychotic episode after taking Morphine  Agitation, hallucinations  Other Reaction(s): Other (See Comments), Other (See Comments)    Patient had a psychotic episode after taking Morphine    Patient had a psychotic episode after taking Morphine Agitation, hallucinations    Penicillins Anaphylaxis     Tolerated cephalosporins in the past    Januvia [sitagliptin] Hives    Carbamazepine Other (See Comments)     hyponatremia  Other Reaction(s): Other (See Comments)    hyponatremia       Current Facility-Administered Medications   Medication Dose Route Frequency Provider Last Rate Last Admin    0.9%  NaCl infusion   Intravenous Continuous Shruthi Pagan MD        acetaminophen tablet 650 mg  650 mg Oral Q8H PRN Shruthi Pagan MD        albuterol-ipratropium 2.5 mg-0.5 mg/3 mL nebulizer solution 3 mL  3 mL Nebulization Q4H PRN Favian Mcdaniel MD        aluminum-magnesium hydroxide-simethicone 200-200-20 mg/5 mL suspension 30 mL  30 mL Oral QID PRN Favian Mcdaniel MD        apixaban tablet 5 mg  5 mg Oral BID Favian Mcdaniel MD   5 mg at 05/03/24 2209    aspirin chewable tablet 81 mg  81 mg Oral Daily Favian Mcdaniel MD   81 mg at 05/03/24 0933    atorvastatin tablet 40 mg  40 mg Oral Daily Favian Mcdaniel MD   40 mg at 05/03/24 0933    bisacodyL  suppository 10 mg  10 mg Rectal Daily PRN Favian Mcdaniel MD   10 mg at 05/03/24 1431    [START ON 5/5/2024] bumetanide tablet 1 mg  1 mg Oral Daily Shruthi Pagan MD        dextrose 10% bolus 125 mL 125 mL  12.5 g Intravenous PRN Favian Mcdaniel MD        dextrose 10% bolus 250 mL 250 mL  25 g Intravenous PRN Favian Mcdaniel MD        divalproex EC tablet 500 mg  500 mg Oral QHS Favian Mcdaniel MD   500 mg at 05/03/24 2209    gabapentin capsule 600 mg  600 mg Oral TID Favian Mcdaniel MD   600 mg at 05/03/24 2210    glucagon (human recombinant) injection 1 mg  1 mg Intramuscular PRN Favian Mcdaniel MD        glucose chewable tablet 16 g  16 g Oral PRN Favian Mcdaniel MD        glucose chewable tablet 24 g  24 g Oral PRN Favian Mcdaniel MD        HYDROcodone-acetaminophen  mg per tablet 1 tablet  1 tablet Oral Q6H PRN Shruthi Pagan MD        hydrOXYzine HCL tablet 50 mg  50 mg Oral TID PRN Favian Mcdaniel MD   50 mg at 05/02/24 2123    insulin aspart U-100 pen 0-5 Units  0-5 Units Subcutaneous QID (AC + HS) Favian Bailon MD   2 Units at 05/03/24 2220    insulin aspart U-100 pen 14 Units  14 Units Subcutaneous TID Shruthi Pagan MD   14 Units at 05/03/24 2221    insulin detemir U-100 (Levemir) pen 30 Units  30 Units Subcutaneous BID Shruthi Pagan MD   30 Units at 05/03/24 2219    [START ON 5/4/2024] lisinopriL tablet 10 mg  10 mg Oral QHS Shruthi Pagan MD        magnesium oxide tablet 800 mg  800 mg Oral PRN Favian Mcdaniel MD        magnesium oxide tablet 800 mg  800 mg Oral PRN Favian Mcdaniel MD        melatonin tablet 6 mg  6 mg Oral Nightly PRN Favian Mcdaniel MD   6 mg at 05/02/24 2122    methocarbamoL tablet 500 mg  500 mg Oral TID Jennifer Garcia PA-C   500 mg at 05/03/24 2210    metoprolol tartrate (LOPRESSOR) split tablet 12.5 mg  12.5 mg Oral BID Shruthi Pagan MD   12.5 mg at 05/03/24 2210    metronidazole IVPB 500 mg  500 mg Intravenous Q8H Shruthi Pagan  mL/hr at 05/03/24 2151 500 mg at  05/03/24 2151    miconazole NITRATE 2 % top powder   Topical (Top) BID Favian Mcdaniel MD   Given at 05/02/24 2140    naloxone 0.4 mg/mL injection 0.02 mg  0.02 mg Intravenous PRN Favian Mcdaniel MD        ondansetron injection 4 mg  4 mg Intravenous Q8H PRN Favian Mcdaniel MD   4 mg at 05/03/24 1049    pantoprazole EC tablet 40 mg  40 mg Oral Daily Favian Mcdaniel MD   40 mg at 05/03/24 0932    polyethylene glycol packet 17 g  17 g Oral BID Shruthi Pagan MD   17 g at 05/03/24 2210    potassium bicarbonate disintegrating tablet 35 mEq  35 mEq Oral PRN Favian Mcdaniel MD        potassium bicarbonate disintegrating tablet 50 mEq  50 mEq Oral PRN Favian Mcdaniel MD        potassium bicarbonate disintegrating tablet 60 mEq  60 mEq Oral PRN Favian Mcdaniel MD        potassium, sodium phosphates 280-160-250 mg packet 2 packet  2 packet Oral PRN Favian Mcdaniel MD        potassium, sodium phosphates 280-160-250 mg packet 2 packet  2 packet Oral PRN Favian Mcdaniel MD        potassium, sodium phosphates 280-160-250 mg packet 2 packet  2 packet Oral PRN Favian Mcdaniel MD        prochlorperazine injection Soln 5 mg  5 mg Intravenous Q6H PRN Favian Mcdaniel MD        QUEtiapine tablet 200 mg  200 mg Oral QHS Reggie Dick MD   200 mg at 05/03/24 2209    risperiDONE tablet 3 mg  3 mg Oral BID Favian Mcdaniel MD   3 mg at 05/03/24 2210    simethicone chewable tablet 80 mg  1 tablet Oral QID PRN Favian Mcdaniel MD        sodium chloride 0.9% flush 10 mL  10 mL Intravenous Q12H PRN Favian Mcdaniel MD        sodium chloride 0.9% flush 10 mL  10 mL Intravenous Q6H Velvet Galicia,    10 mL at 05/03/24 1640    And    sodium chloride 0.9% flush 10 mL  10 mL Intravenous PRN Velvet Galicia,         vancomycin - pharmacy to dose   Intravenous pharmacy to manage frequency Favian Mcdaniel MD        vancomycin 1,500 mg in dextrose 5 % (D5W) 250 mL IVPB (Vial-Mate)  1,500 mg Intravenous Q12H Velvet Galicia DO   Stopped at 05/03/24 1220     Family  History       Problem Relation (Age of Onset)    Cataracts Father    Diabetes Father, Paternal Grandfather    Heart disease Father, Paternal Grandfather    Hypertension Father    No Known Problems Mother, Sister, Brother, Maternal Aunt, Maternal Uncle, Paternal Aunt, Paternal Uncle, Maternal Grandfather    Ovarian cancer Maternal Grandmother, Paternal Grandmother          Tobacco Use    Smoking status: Former     Current packs/day: 0.00     Average packs/day: 0.5 packs/day for 37.0 years (18.5 ttl pk-yrs)     Types: Cigarettes     Start date: 12/6/1983     Quit date: 12/6/2020     Years since quitting: 3.4    Smokeless tobacco: Current    Tobacco comments:     Enrolled in the [employer] on 5/3/14 ([facility] Member ID # [id number]). Ambulatory referral to Smoking Cessation Program   Substance and Sexual Activity    Alcohol use: No     Alcohol/week: 0.0 standard drinks of alcohol    Drug use: No    Sexual activity: Yes     Partners: Male     Review of Systems   Constitutional: Negative.    HENT: Negative.     Eyes: Negative.    Respiratory: Negative.     Cardiovascular: Negative.    Gastrointestinal: Negative.    Endocrine: Negative.    Genitourinary: Negative.    Musculoskeletal: Negative.    Skin:  Positive for rash.   Allergic/Immunologic: Negative.    Neurological:  Positive for headaches.   Psychiatric/Behavioral: Negative.       Objective:     Vital Signs (Most Recent):  Temp: 97.9 °F (36.6 °C) (05/03/24 1931)  Pulse: 80 (05/03/24 1931)  Resp: 19 (05/03/24 1931)  BP: 134/63 (05/03/24 1931)  SpO2: (!) 87 % (05/03/24 1931) Vital Signs (24h Range):  Temp:  [97.8 °F (36.6 °C)-98.5 °F (36.9 °C)] 97.9 °F (36.6 °C)  Pulse:  [67-80] 80  Resp:  [16-19] 19  SpO2:  [86 %-98 %] 87 %  BP: (105-140)/(53-70) 134/63     Weight: 112 kg (246 lb 14.4 oz)  Body mass index is 39.85 kg/m².     Physical Exam  Vitals and nursing note reviewed.   Constitutional:       General: She is not in acute distress.     Appearance: Normal  appearance. She is obese. She is not ill-appearing.   HENT:      Head: Normocephalic and atraumatic.      Nose: Nose normal.      Mouth/Throat:      Mouth: Mucous membranes are moist.   Eyes:      Extraocular Movements: Extraocular movements intact.   Cardiovascular:      Rate and Rhythm: Normal rate.      Pulses: Normal pulses.      Heart sounds: No murmur heard.  Pulmonary:      Effort: Pulmonary effort is normal. No respiratory distress.   Abdominal:      General: Abdomen is flat.      Palpations: Abdomen is soft.      Tenderness: There is no abdominal tenderness.   Musculoskeletal:      Right lower leg: No edema.      Comments: Left BKA   Skin:     General: Skin is warm.      Capillary Refill: Capillary refill takes less than 2 seconds.      Findings: Rash present. Rash is nodular and papular.      Comments: Present on her scalp, face, upper back bilaterally, and chest.    Neurological:      General: No focal deficit present.      Mental Status: She is alert.   Psychiatric:         Mood and Affect: Mood normal.                Significant Labs: All pertinent labs within the past 24 hours have been reviewed.    Significant Imaging: I have reviewed all pertinent imaging results/findings within the past 24 hours.

## 2024-05-04 NOTE — ASSESSMENT & PLAN NOTE
A1c:   Lab Results   Component Value Date    HGBA1C 11.0 (H) 04/22/2024     Meds: basal bolus insulin + SSI PRN to maintain goal 140-180  ADA diet, accuchecks ACHS, hypoglycemic protocol  -home regimen as follows:  Levemir 15 units b.i.d., NovoLog 5 units 3 times daily  -continues to have hyperglycemia, likely uncontrolled given A1c 11 and also exacerbated in setting of infection  -titrate insulin as needed  -current regimen while inpatient:  increased to Basal insulin at 30 units b.i.d., prandial insulin 14 units TIDWM.   -continue SSI

## 2024-05-04 NOTE — PLAN OF CARE
No response received from [facility] after 3:10 p.m.  Patient left hospital without O2. Dr. Pagan notified via chat by nurse.  SW attempt to followup with [facility] after hours. No way of following up tonight per  Karlos with [facility].  Karlos suggested that SW followup tomorrow during work hours.  6:01 p.m.

## 2024-05-04 NOTE — NURSING
Home Oxygen Evaluation    Date Performed: 5/4/2024    1) Patient's Home O2 Sat on room air, while at rest: 87%        If O2 sats on room air at rest are 88% or below, patient qualifies. No additional testing needed. Document N/A in steps 2 and 3. If 89% or above, complete steps 2.      2) Patient's O2 Sat on room air while exercising: NA; L BKA        If O2 sats on room air while exercising remain 89% or above patient does not qualify, no further testing needed Document N/A in step 3. If O2 sats on room air while exercising are 88% or below, continue to step 3.      3) Patient's O2 Sat while exercising on O2: 96 at 2 LPM         (Must show improvement from #2 for patients to qualify)    If O2 sats improve on oxygen, patient qualifies for portable oxygen. If not, the patient does not qualify.

## 2024-05-04 NOTE — ASSESSMENT & PLAN NOTE
Patient has Abnormal Magnesium: hypomagnesemia. Will continue to monitor electrolytes closely. Will replace the affected electrolytes and repeat labs to be done after interventions completed. The patient's magnesium results have been reviewed and are listed below.  Recent Labs   Lab 05/03/24  0406   MG 1.7          -Mg level 1.3 on admission  -replace as needed

## 2024-05-04 NOTE — ASSESSMENT & PLAN NOTE
Chronic, controlled.     Latest blood pressure and vitals reviewed-     Temp:  [97.8 °F (36.6 °C)-98.5 °F (36.9 °C)]   Pulse:  [67-77]   Resp:  [16-18]   BP: (105-140)/(53-70)   SpO2:  [86 %-98 %] .   Home meds for hypertension were reviewed and noted below.   Hypertension Medications               bumetanide (BUMEX) 1 MG tablet Take 1 tablet (1 mg total) by mouth once daily.    lisinopriL 10 MG tablet Take 1 tablet (10 mg total) by mouth once daily.    metoprolol tartrate (LOPRESSOR) 25 MG tablet Take 1 tablet (25 mg total) by mouth 2 (two) times daily.            While in the hospital, will manage blood pressure as follows; Continue home antihypertensive regimen    Will utilize p.r.n. blood pressure medication only if patient's blood pressure greater than 180/110 and she develops symptoms such as worsening chest pain or shortness of breath.

## 2024-05-04 NOTE — NURSING
Patient stated that she is leaving without her home oxygen; encouraged and highly recommended that she stay to receive her oxygen.  She refused to stay and left with family at bedside.

## 2024-05-04 NOTE — PLAN OF CARE
[place] [facility]  Discharge Final Note    Primary Care Provider: Donaldo Pena MD    Expected Discharge Date: 5/4/2024    Final Discharge Note (most recent)       Final Note - 05/04/24 1801          Final Note    Assessment Type Final Discharge Note     Anticipated Discharge Disposition Home or Self Care     Hospital Resources/Appts/Education Provided Appointments scheduled and added to AVS        Post-Acute Status    Discharge Delays Home Medical Equipment (Insurance, Delivery)   Patient left prior to delivery of O2.  DME rep. did not give OK for DME and after hours unable to assist.  SW advised to call back tomorrow.                    Important Message from Medicare             Contact Info       Donaldo Pena MD   Specialty: Internal Medicine, Wound Care   Relationship: PCP - General    [address] [place] [id number]   Phone: [phone]       Next Steps: Follow up    Instructions: Dr. Pena's office will call you with the date and time of your followup appointment.    [facility]    [address] [place] [id number]   Phone: [phone]       Next Steps: Follow up    Instructions: DME:  Oxygen

## 2024-05-04 NOTE — SUBJECTIVE & OBJECTIVE
This follow-up encounter was provided through telemedicine to address  Facial cellulitis present on admission.  Patient was transferred to the telemedicine service on:  05/03/2024   The patient location is: [id number] A admitted 4/30/2024  4:28 PM.      Interval History/Overnight Events:   Clinical record since admit reviewed.    Patient is able to provide adequate history.    Patient with numerous evident bites to head and neck; scalp remains with signicant itching and pain - erythema improving and itching improving but remains with headache; glucoses remain uncontrolled; blood culture with clostridium perfringens - she stated she did have diarrhea and epigastric abd pain before admit but no BM since admitted.  Repeat blood cx NG.     Review of Systems   Constitutional:  Positive for activity change and fatigue. Negative for fever.   Respiratory:  Negative for shortness of breath.    Gastrointestinal:  Negative for diarrhea and vomiting.   Skin:  Positive for wound.          I have reviewed the following on 05/03/2024:    Data  Details     [x]   Lab results reviewed   Wbc 11.2; Hgb 12.1; na 137; CO2 rising;     [x]   Micro reports reviewed  <50k group B strep to urine; blood cx as above    []   Pathology reports reviewed      []   Imaging reports reviewed      [x]   Cardiology Procedure reports reviewed  Echo - EF 55-60%; nl RV fxn    []   Non-[facility] records/CareEverywhere notes reviewed      [x]  Tests/studies orders placed or verified   BNP    []  Independently viewed/assessed       []  05/03/2024 Discussion of:        Inpatient Medications reviewed and prescribed for management of current problems:  Scheduled Meds:  Current Facility-Administered Medications   Medication Dose Route Frequency    apixaban  5 mg Oral BID    aspirin  81 mg Oral Daily    atorvastatin  40 mg Oral Daily    bumetanide  1 mg Oral Daily    divalproex  500 mg Oral QHS    gabapentin  600 mg Oral TID    insulin aspart U-100  14 Units  Subcutaneous TID    insulin detemir U-100 (Levemir)  30 Units Subcutaneous BID    lisinopriL  10 mg Oral Daily    methocarbamoL  500 mg Oral TID    metoprolol tartrate  25 mg Oral BID    metronidazole  500 mg Intravenous Q8H    miconazole NITRATE 2 %   Topical (Top) BID    pantoprazole  40 mg Oral Daily    polyethylene glycol  17 g Oral BID    QUEtiapine  200 mg Oral QHS    risperiDONE  3 mg Oral BID    sodium chloride 0.9%  10 mL Intravenous Q6H    vancomycin (VANCOCIN) IV (PEDS and ADULTS)  1,500 mg Intravenous Q12H     Continuous Infusions:  Current Facility-Administered Medications   Medication Dose Route Frequency Last Rate Last Admin     PRN Meds:.  Current Facility-Administered Medications:     acetaminophen, 650 mg, Oral, Q8H PRN    acetaminophen, 650 mg, Oral, Q4H PRN    albuterol-ipratropium, 3 mL, Nebulization, Q4H PRN    aluminum-magnesium hydroxide-simethicone, 30 mL, Oral, QID PRN    bisacodyL, 10 mg, Rectal, Daily PRN    dextrose 10%, 12.5 g, Intravenous, PRN    dextrose 10%, 25 g, Intravenous, PRN    glucagon (human recombinant), 1 mg, Intramuscular, PRN    glucose, 16 g, Oral, PRN    glucose, 24 g, Oral, PRN    HYDROcodone-acetaminophen, 1 tablet, Oral, Q6H PRN    hydrOXYzine HCL, 50 mg, Oral, TID PRN    insulin aspart U-100, 0-5 Units, Subcutaneous, QID (AC + HS) PRN    magnesium oxide, 800 mg, Oral, PRN    magnesium oxide, 800 mg, Oral, PRN    melatonin, 6 mg, Oral, Nightly PRN    naloxone, 0.02 mg, Intravenous, PRN    ondansetron, 4 mg, Intravenous, Q8H PRN    potassium bicarbonate, 35 mEq, Oral, PRN    potassium bicarbonate, 50 mEq, Oral, PRN    potassium bicarbonate, 60 mEq, Oral, PRN    potassium, sodium phosphates, 2 packet, Oral, PRN    potassium, sodium phosphates, 2 packet, Oral, PRN    potassium, sodium phosphates, 2 packet, Oral, PRN    prochlorperazine, 5 mg, Intravenous, Q6H PRN    simethicone, 1 tablet, Oral, QID PRN    sodium chloride 0.9%, 10 mL, Intravenous, Q12H PRN    Flushing  PICC/Midline Protocol, , , Until Discontinued **AND** sodium chloride 0.9%, 10 mL, Intravenous, Q6H **AND** sodium chloride 0.9%, 10 mL, Intravenous, PRN    Pharmacy to dose Vancomycin consult, , , Once **AND** vancomycin - pharmacy to dose, , Intravenous, pharmacy to manage frequency      Objective:     Temp:  [97.8 °F (36.6 °C)-98.5 °F (36.9 °C)] 98 °F (36.7 °C)  Pulse:  [67-77] 75  Resp:  [16-18] 18  SpO2:  [86 %-98 %] 94 %  BP: (105-140)/(53-70) 105/53      Intake/Output Summary (Last 24 hours) at 5/3/2024 1914  Last data filed at 5/3/2024 1805  Gross per 24 hour   Intake 1425.8 ml   Output --   Net 1425.8 ml        Body mass index is 39.85 kg/m².    Physical Exam  Vitals and nursing note reviewed.   Constitutional:       General: She is not in acute distress.     Appearance: She is obese. She is ill-appearing.   HENT:      Head: Normocephalic and atraumatic.   Cardiovascular:      Rate and Rhythm: Normal rate.   Pulmonary:      Effort: Pulmonary effort is normal. No tachypnea or respiratory distress.   Musculoskeletal:      Right Lower Extremity: Right leg is amputated below knee.   Skin:     Comments: Numerous hyperpigmented papules to face with surrounding erythema    Neurological:      General: No focal deficit present.      Mental Status: She is alert. Mental status is at baseline.      Motor: Weakness (generalized) present.   Psychiatric:         Attention and Perception: Attention normal.         Mood and Affect: Affect is flat.         Speech: Speech normal.         Behavior: Behavior is cooperative.          Labs: All labs within the last 24 hours were reviewed.   Recent Results (from the past 24 hour(s))   POCT glucose    Collection Time: 05/02/24  8:17 PM   Result Value Ref Range    POCT Glucose 377 (H) 70 - 110 mg/dL   CBC Auto Differential    Collection Time: 05/03/24  4:06 AM   Result Value Ref Range    WBC 11.28 3.90 - 12.70 K/uL    RBC 4.41 4.00 - 5.40 M/uL    Hemoglobin 12.1 12.0 - 16.0 g/dL     Hematocrit 38.3 37.0 - 48.5 %    MCV 87 82 - 98 fL    MCH 27.4 27.0 - 31.0 pg    MCHC 31.6 (L) 32.0 - 36.0 g/dL    RDW 14.2 11.5 - 14.5 %    Platelets 363 150 - 450 K/uL    MPV 10.7 9.2 - 12.9 fL    Immature Granulocytes 1.0 (H) 0.0 - 0.5 %    Gran # (ANC) 2.9 1.8 - 7.7 K/uL    Immature Grans (Abs) 0.11 (H) 0.00 - 0.04 K/uL    Lymph # 6.1 (H) 1.0 - 4.8 K/uL    Mono # 1.3 (H) 0.3 - 1.0 K/uL    Eos # 0.7 (H) 0.0 - 0.5 K/uL    Baso # 0.16 0.00 - 0.20 K/uL    nRBC 0 0 /100 WBC    Gran % 25.3 (L) 38.0 - 73.0 %    Lymph % 54.4 (H) 18.0 - 48.0 %    Mono % 11.4 4.0 - 15.0 %    Eosinophil % 6.5 0.0 - 8.0 %    Basophil % 1.4 0.0 - 1.9 %    Differential Method Automated    Basic Metabolic Panel    Collection Time: 05/03/24  4:06 AM   Result Value Ref Range    Sodium 137 136 - 145 mmol/L    Potassium 4.6 3.5 - 5.1 mmol/L    Chloride 94 (L) 95 - 110 mmol/L    CO2 33 (H) 23 - 29 mmol/L    Glucose 320 (H) 70 - 110 mg/dL    BUN 17 6 - 20 mg/dL    Creatinine 1.0 0.5 - 1.4 mg/dL    Calcium 9.1 8.7 - 10.5 mg/dL    Anion Gap 10 8 - 16 mmol/L    eGFR >60 >60 mL/min/1.73 m^2   Magnesium    Collection Time: 05/03/24  4:06 AM   Result Value Ref Range    Magnesium 1.7 1.6 - 2.6 mg/dL   Phosphorus    Collection Time: 05/03/24  4:06 AM   Result Value Ref Range    Phosphorus 5.2 (H) 2.7 - 4.5 mg/dL   POCT glucose    Collection Time: 05/03/24  7:43 AM   Result Value Ref Range    POCT Glucose 276 (H) 70 - 110 mg/dL   POCT glucose    Collection Time: 05/03/24 11:26 AM   Result Value Ref Range    POCT Glucose 324 (H) 70 - 110 mg/dL   POCT glucose    Collection Time: 05/03/24  3:50 PM   Result Value Ref Range    POCT Glucose 342 (H) 70 - 110 mg/dL        Lab Results   Component Value Date    [id number] Negative 04/30/2024       Recent Labs   Lab 05/01/24  0604 05/02/24  0656 05/03/24  0406   WBC 16.51* 11.50 11.28   LYMPH 24.2  4.0 39.0  4.5 54.4*  6.1*   HGB 12.1 12.4 12.1   HCT 38.1 39.3 38.3    397 363     Recent Labs   Lab  "05/01/24  0604 05/02/24  0656 05/03/24  0406    137 137   K 5.2* 4.8 4.6   CL 98 97 94*   CO2 27 32* 33*   BUN 22* 16 17   CREATININE 0.8 0.8 1.0   * 318* 320*   CALCIUM 8.9 9.1 9.1   MG 1.3* 1.7 1.7   PHOS 5.8* 5.0* 5.2*     Recent Labs   Lab 05/01/24  0604   ALKPHOS 68   ALT 14   AST 14   ALBUMIN 2.9*   PROT 5.4*   BILITOT 0.3        No results for input(s): "DDIMER", "FERRITIN", "CRP", "LDH", "BNP", "TROPONINI", "CPK" in the last 72 hours.    Invalid input(s): "PROCALCITONIN"        Microbiology: All microbiology updates for the past 24 hours have been reviewed.  Microbiology Results (last 7 days)       Procedure Component Value Units Date/Time    Blood culture #1 **CANNOT BE ORDERED STAT** [[id number]]  (Abnormal) Collected: 04/30/24 2127    Order Status: Completed Specimen: Blood from Peripheral, Forearm, Left Updated: 05/03/24 1239     Blood Culture, Routine Gram stain abbe bottle: Gram positive rods      Results called to and read back by: Lorrie Ritter 05/01/2024  07:54      CLOSTRIDIUM PERFRINGENS    Blood culture [[id number]] Collected: 05/01/24 0918    Order Status: Completed Specimen: Blood from Peripheral, Antecubital, Left Updated: 05/03/24 1103     Blood Culture, Routine No Growth to date      No Growth to date      No Growth to date    Blood culture [[id number]] Collected: 05/01/24 0912    Order Status: Completed Specimen: Blood from Peripheral, Antecubital, Right Updated: 05/03/24 1103     Blood Culture, Routine No Growth to date      No Growth to date      No Growth to date    Urine culture [[id number]]  (Abnormal) Collected: 05/01/24 0414    Order Status: Completed Specimen: Urine Updated: 05/03/24 0755     Urine Culture, Routine STREPTOCOCCUS AGALACTIAE (GROUP B)  50,000 - 99,999 cfu/ml  In case of Penicillin allergy, call lab for further testing.  Beta-hemolytic streptococci are routinely susceptible to   penicillins,cephalosporins and carbapenems.      Narrative:      Specimen " Source->Urine    Blood culture #2 **CANNOT BE ORDERED STAT** [[id number]] Collected: 04/30/24 2136    Order Status: Completed Specimen: Blood from Peripheral, Antecubital, Right Updated: 05/03/24 0303     Blood Culture, Routine No Growth to date      No Growth to date      No Growth to date    Rapid Organism ID by PCR (from Blood culture) [[id number]] Collected: 04/30/24 2127    Order Status: Completed Updated: 05/02/24 1830     Enterococcus faecalis Not Detected     Enterococcus faecium Not Detected     Listeria monocytogenes Not Detected     Staphylococcus spp. Not Detected     Staphylococcus aureus Not Detected     Staphylococcus epidermidis Not Detected     Staphylococcus lugdunensis Not Detected     Streptococcus species Not Detected     Streptococcus agalactiae Not Detected     Streptococcus pneumoniae Not Detected     Streptococcus pyogenes Not Detected     Acinetobacter calcoaceticus/baumannii complex Not Detected     Bacteroides fragilis Not Detected     Enterobacterales Not Detected     Enterobacter cloacae complex Not Detected     Escherichia coli Not Detected     Klebsiella aerogenes Not Detected     Klebsiella oxytoca Not Detected     Klebsiella pneumoniae group Not Detected     Proteus Not Detected     Salmonella sp Not Detected     Serratia marcescens Not Detected     Haemophilus influenzae Not Detected     Neisseria meningtidis Not Detected     Pseudomonas aeruginosa Not Detected     Stenotrophomonas maltophilia Not Detected     Candida albicans Not Detected     Candida auris Not Detected     Candida glabrata Not Detected     Candida krusei Not Detected     Candida parapsilosis Not Detected     Candida tropicalis Not Detected     Cryptococcus neoformans/gattii Not Detected     CTX-M (ESBL ) Test Not Applicable     IMP (Carbapenem resistant) Test Not Applicable     KPC resistance gene (Carbapenem resistant) Test Not Applicable     mcr-1  Test Not Applicable     mec A/C  Test Not Applicable      mec A/C and MREJ (MRSA) gene Test Not Applicable     NDM (Carbapenem resistant) Test Not Applicable     OXA-48-like (Carbapenem resistant) Test Not Applicable     van A/B (VRE gene) Test Not Applicable     VIM (Carbapenem resistant) Test Not Applicable              Imaging All imaging within the last 24 hours was reviewed.       Results for orders placed during the hospital encounter of 04/30/24    Echo    Interpretation Summary    Left Ventricle: The left ventricle is normal in size. Normal wall thickness. There is normal systolic function with a visually estimated ejection fraction of 55 - 60%.    Right Ventricle: Normal right ventricular cavity size. Systolic function is normal.    Left Atrium: Left atrium is mildly dilated.    Pulmonary Artery: The estimated pulmonary artery systolic pressure is 21 mmHg.    IVC/SVC: Normal venous pressure at 3 mmHg.      Echo    Left Ventricle: The left ventricle is normal in size. Normal wall   thickness. There is normal systolic function with a visually estimated   ejection fraction of 55 - 60%.    Right Ventricle: Normal right ventricular cavity size. Systolic   function is normal.    Left Atrium: Left atrium is mildly dilated.    Pulmonary Artery: The estimated pulmonary artery systolic pressure is   21 mmHg.    IVC/SVC: Normal venous pressure at 3 mmHg.

## 2024-05-05 DIAGNOSIS — J44.9 CHRONIC OBSTRUCTIVE PULMONARY DISEASE, UNSPECIFIED COPD TYPE: Primary | Chronic | ICD-10-CM

## 2024-05-05 LAB
BACTERIA BLD CULT: NORMAL

## 2024-05-05 NOTE — PLAN OF CARE
Post discharge:  MELVI f/u [facility] regarding O2.  Angelika processed order this date as she thought patient had left AMA yesterday.  MELVI advised that patient had not left AMA.  Information was verified and corrected as needed.  Per Angelika,  O2 will be  delivered to the home this date.    MELVI updated patient.

## 2024-05-06 ENCOUNTER — HOSPITAL ENCOUNTER (OUTPATIENT)
Dept: RADIOLOGY | Facility: HOSPITAL | Age: 54
Discharge: HOME | End: 2024-05-06
Attending: SURGERY
Payer: MEDICAID

## 2024-05-06 DIAGNOSIS — I87.2 VENOUS INSUFFICIENCY: ICD-10-CM

## 2024-05-06 PROCEDURE — 93970 EXTREMITY STUDY: CPT | Mod: TC

## 2024-05-06 PROCEDURE — 93970 EXTREMITY STUDY: CPT | Mod: 26,,, | Performed by: RADIOLOGY

## 2024-05-10 ENCOUNTER — OFFICE VISIT (OUTPATIENT)
Dept: FAMILY MEDICINE | Facility: CLINIC | Age: 54
End: 2024-05-10
Payer: MEDICAID

## 2024-05-10 VITALS
HEIGHT: 66 IN | RESPIRATION RATE: 16 BRPM | HEART RATE: 75 BPM | DIASTOLIC BLOOD PRESSURE: 58 MMHG | SYSTOLIC BLOOD PRESSURE: 128 MMHG | TEMPERATURE: 98 F | BODY MASS INDEX: 39.85 KG/M2 | OXYGEN SATURATION: 95 %

## 2024-05-10 DIAGNOSIS — B96.7 CLOSTRIDIUM PERFRINGENS INFECTION: ICD-10-CM

## 2024-05-10 DIAGNOSIS — G47.30 SLEEP APNEA, UNSPECIFIED TYPE: ICD-10-CM

## 2024-05-10 DIAGNOSIS — E11.49 TYPE II DIABETES MELLITUS WITH NEUROLOGICAL MANIFESTATIONS: ICD-10-CM

## 2024-05-10 DIAGNOSIS — H60.503 ACUTE OTITIS EXTERNA OF BOTH EARS, UNSPECIFIED TYPE: ICD-10-CM

## 2024-05-10 DIAGNOSIS — I10 ESSENTIAL HYPERTENSION: Chronic | ICD-10-CM

## 2024-05-10 DIAGNOSIS — J44.9 CHRONIC OBSTRUCTIVE PULMONARY DISEASE, UNSPECIFIED COPD TYPE: Primary | ICD-10-CM

## 2024-05-10 PROCEDURE — 99214 OFFICE O/P EST MOD 30 MIN: CPT | Mod: S$PBB,,, | Performed by: NURSE PRACTITIONER

## 2024-05-10 PROCEDURE — 3060F POS MICROALBUMINURIA REV: CPT | Mod: CPTII,,, | Performed by: NURSE PRACTITIONER

## 2024-05-10 PROCEDURE — 3078F DIAST BP <80 MM HG: CPT | Mod: CPTII,,, | Performed by: NURSE PRACTITIONER

## 2024-05-10 PROCEDURE — 3066F NEPHROPATHY DOC TX: CPT | Mod: CPTII,,, | Performed by: NURSE PRACTITIONER

## 2024-05-10 PROCEDURE — 1111F DSCHRG MED/CURRENT MED MERGE: CPT | Mod: CPTII,,, | Performed by: NURSE PRACTITIONER

## 2024-05-10 PROCEDURE — 3008F BODY MASS INDEX DOCD: CPT | Mod: CPTII,,, | Performed by: NURSE PRACTITIONER

## 2024-05-10 PROCEDURE — 1159F MED LIST DOCD IN RCRD: CPT | Mod: CPTII,,, | Performed by: NURSE PRACTITIONER

## 2024-05-10 PROCEDURE — 1160F RVW MEDS BY RX/DR IN RCRD: CPT | Mod: CPTII,,, | Performed by: NURSE PRACTITIONER

## 2024-05-10 PROCEDURE — 4010F ACE/ARB THERAPY RXD/TAKEN: CPT | Mod: CPTII,,, | Performed by: NURSE PRACTITIONER

## 2024-05-10 PROCEDURE — 99215 OFFICE O/P EST HI 40 MIN: CPT | Mod: PBBFAC,PN | Performed by: NURSE PRACTITIONER

## 2024-05-10 PROCEDURE — 3046F HEMOGLOBIN A1C LEVEL >9.0%: CPT | Mod: CPTII,,, | Performed by: NURSE PRACTITIONER

## 2024-05-10 PROCEDURE — 3072F LOW RISK FOR RETINOPATHY: CPT | Mod: CPTII,,, | Performed by: NURSE PRACTITIONER

## 2024-05-10 PROCEDURE — 3074F SYST BP LT 130 MM HG: CPT | Mod: CPTII,,, | Performed by: NURSE PRACTITIONER

## 2024-05-10 PROCEDURE — 99999 PR PBB SHADOW E&M-EST. PATIENT-LVL V: CPT | Mod: PBBFAC,,, | Performed by: NURSE PRACTITIONER

## 2024-05-10 RX ORDER — CIPROFLOXACIN AND DEXAMETHASONE 3; 1 MG/ML; MG/ML
4 SUSPENSION/ DROPS AURICULAR (OTIC) 2 TIMES DAILY
Qty: 7.5 ML | Refills: 0 | Status: SHIPPED | OUTPATIENT
Start: 2024-05-10 | End: 2024-05-17

## 2024-05-10 NOTE — PROGRESS NOTES
Routine Office Visit    Patient Name: Audrey Natarajan    : 1970  MRN: [id number]    Chief Complaint:  Hospital follow-up    Subjective:  Audrey is a 53 y.o. female who presents today for:    Hospital follow-up - patient who is known to me with the below documented medical history reports today for hospital follow-up.  Recently was hospitalized with facial cellulitis after being bit by multiple mosquitos and gnats.  She had a blood culture that showed Clostridium perfringens was treated with antibiotics in the hospital and discharged with metronidazole which she has been taking without problems.  She has a history of COPD and sleep apnea.  She states that she had to leave before getting her oxygen tanks but her oxygen was delivered to her house.  She mostly wears her O2 2 L at night via nasal cannula.  She notes a history of sleep apnea but has not been on her machine for years now.    Since leaving the hospital she has had some bilateral ear pain worse on the right than left.  Notes a muffled sensation in bilateral ears.  No fevers or chills.  She notes that the cellulitis on her face has significantly improved since being discharged as well.  She denies any shortness of breath or chest pain today.    Past Medical History  Past Medical History:   Diagnosis Date    ADHD (attention deficit hyperactivity disorder)     Arthritis     Asthma     Bipolar 1 disorder     Cataract     Cigarette smoker     COPD (chronic obstructive pulmonary disease)     Coronary artery disease     A fib    Depression     bipolar manic depresson    Diabetes mellitus     Diabetic foot ulcers     Diabetic neuropathy     DVT of lower extremity, bilateral 2013    bilateral LE DVT. [place] filter placed.     Encounter for blood transfusion     History of blood clots 1. Left Leg=; 2.Bilateral Groin=Blood Clots=  or 2013 & 2013; 3. LLL of Lung=2013;  4. Lt. Lower Leg=2013.     Pt. had 1st Blood Clot after  [id number], & Last=. [place] Filter= Rt.Lateral Neck.    HTN (hypertension) 2013    Pt states that she does not have hypertension    Hypercholesteremia     Irregular heartbeat     Neuromuscular disorder     neuropathy feet    Obese     PE (pulmonary embolism) 2013    bilat LE DVT.     Restless leg syndrome        Family History  Family History   Problem Relation Name Age of Onset    Hypertension Father      Diabetes Father      Heart disease Father      Cataracts Father      Diabetes Paternal Grandfather      Heart disease Paternal Grandfather      No Known Problems Mother      Ovarian cancer Maternal Grandmother           from this. ? age     No Known Problems Sister      No Known Problems Brother      No Known Problems Maternal Aunt      No Known Problems Maternal Uncle      No Known Problems Paternal Aunt      No Known Problems Paternal Uncle      No Known Problems Maternal Grandfather      Ovarian cancer Paternal Grandmother      Uterine cancer Neg Hx      Breast cancer Neg Hx      Colon cancer Neg Hx      Amblyopia Neg Hx      Blindness Neg Hx      Cancer Neg Hx      Glaucoma Neg Hx      Macular degeneration Neg Hx      Retinal detachment Neg Hx      Strabismus Neg Hx      Stroke Neg Hx      Thyroid disease Neg Hx         Current Medications  Current Outpatient Medications on File Prior to Visit   Medication Sig Dispense Refill    albuterol (PROVENTIL/VENTOLIN HFA) 90 mcg/actuation inhaler INHALE 2 PUFFS INTO THE LUNGS EVERY 6 HOURS AS NEEDED FOR WHEEZING. RESCUE 6.7 g 2    albuterol-ipratropium (DUO-NEB) 2.5 mg-0.5 mg/3 mL nebulizer solution Take 3 mLs by nebulization every 6 (six) hours as needed for Wheezing or Shortness of Breath. Rescue 60 mL 0    ammonium lactate (LAC-HYDRIN) 12 % lotion APPL Y ONCE TOPICALLY TWICE DAILY FOR 30 DAYS 225 g 0    atorvastatin (LIPITOR) 40 MG tablet Take 1 tablet (40 mg total) by mouth once daily. 90 tablet 3    BIOFREEZE, MENTHOL, TOP Apply  topically.      blood sugar diagnostic Strp To check BG three times daily, to use with insurance preferred meter 300 each 3    blood-glucose meter (TRUE METRIX GLUCOSE METER) Misc USE TO TEST BLOOD GLUCOSE THREE TIMES DAILY AS DIRECTED 1 each 0    bumetanide (BUMEX) 1 MG tablet Take 1 tablet (1 mg total) by mouth once daily. 90 tablet 3    cyanocobalamin (VITAMIN B-12) 1000 MCG tablet Take 100 mcg by mouth once daily.      diclofenac sodium (VOLTAREN) 1 % Gel Apply 2 g topically 2 (two) times daily. 100 g 3    divalproex (DEPAKOTE) 500 MG TbEC Take 1 tablet (500 mg total) by mouth every evening. 30 tablet 11    DUPIXENT  mg/2 mL PnIj Inject into the skin every 14 (fourteen) days.      ELIQUIS 5 mg Tab TAKE 1 TABLET(5 MG) BY MOUTH TWICE DAILY 60 tablet 2    ferrous sulfate 325 (65 FE) MG EC tablet Take 1 tablet (325 mg total) by mouth once daily. 30 tablet 0    fluconazole (DIFLUCAN) 150 MG Tab Take 1 tablet (150 mg total) by mouth once daily. May take second tab po two days after first if symptoms persist 2 tablet 0    fluticasone propionate (FLONASE) 50 mcg/actuation nasal spray 2 sprays (100 mcg total) by Each Nostril route once daily. 48 mL 0    fluticasone-salmeterol diskus inhaler 250-50 mcg Inhale 1 puff into the lungs 2 (two) times daily. Controller 60 each 2    gabapentin (NEURONTIN) 300 MG capsule Take 2 capsules (600 mg total) by mouth 3 (three) times daily. 180 capsule 2    HYDROcodone-acetaminophen (NORCO)  mg per tablet Take 1 tablet by mouth every 6 (six) hours as needed for Pain. 15 tablet 0    hydrOXYzine HCL (ATARAX) 25 MG tablet Take 1 tablet (25 mg total) by mouth every 6 (six) hours as needed for itching or anxiety. 20 tablet 0    insulin aspart U-100 (NOVOLOG) 100 unit/mL (3 mL) InPn pen Inject 7 Units into the skin 3 (three) times daily.      insulin aspart U-100 (NOVOLOG) 100 unit/mL (3 mL) InPn pen Inject 0-5 Units into the skin before meals and at bedtime as needed  "(Hyperglycemia). **LOW CORRECTION DOSE**  Blood Glucose  mg/dL                  Pre-meal              151-200                0 unit                    201-250                2 units                    251-300                3 units                     301-350                4 units                    >350                     5 units                     Administer subcutaneously if needed at times designated by monitoring schedule.      insulin detemir U-100, Levemir, 100 unit/mL (3 mL) SubQ InPn pen Inject 17 Units into the skin 2 (two) times daily.      lancets Misc To check BG three times daily, to use with insurance preferred meter 300 each 3    LIDOcaine (LIDODERM) 5 % UNWRAP AND APPLY 1 PATCH TO SKIN ONCE DAILY. REMOVE AFTER 12 HOURS 15 patch 1    lisinopriL 10 MG tablet Take 1 tablet (10 mg total) by mouth every evening. 90 tablet 0    loratadine (CLARITIN) 10 mg tablet TAKE 1 TABLET BY MOUTH DAILY 90 tablet 3    meloxicam (MOBIC) 7.5 MG tablet Take 1 tablet (7.5 mg total) by mouth once daily. 30 tablet 1    metFORMIN (GLUCOPHAGE) 1000 MG tablet Take 1 tablet (1,000 mg total) by mouth 2 (two) times daily with meals. 180 tablet 1    methocarbamoL (ROBAXIN) 500 MG Tab TAKE 1 TABLET(500 MG) BY MOUTH THREE TIMES DAILY as needed for back pain 45 tablet 1    metoprolol tartrate (LOPRESSOR) 25 MG tablet Take 1 tablet (25 mg total) by mouth 2 (two) times daily. 180 tablet 3    metroNIDAZOLE (FLAGYL) 500 MG tablet Take 1 tablet (500 mg total) by mouth every 8 (eight) hours. for 13 days 39 tablet 0    multivitamin Tab Take 1 tablet by mouth once daily. 30 tablet 2    nystatin (NYSTOP) powder APPLY TO ABDOMINAL AND BREAST SKIN FOLD TWICE DAILY 60 g 2    pantoprazole (PROTONIX) 40 MG tablet TAKE 1 TABLET(40 MG) BY MOUTH EVERY DAY 90 tablet 3    pen needle, diabetic 32 gauge x 5/32" Ndle USE TO INJECT INSULIN FOUR TIMES DAILY AS DIRECTED 100 each 1    risperiDONE (RISPERDAL) 3 MG Tab Take 1 tablet (3 mg total) by mouth in " the morning and 1 tablet (3 mg total) in the evening. Indications: Mood. 60 tablet 0    semaglutide (OZEMPIC) 2 mg/dose (8 mg/3 mL) PnIj Inject 2 mg into the skin every 7 days. 3 mL 11    traMADoL (ULTRAM) 50 mg tablet Take 1 tablet (50 mg total) by mouth every 8 (eight) hours as needed for Pain.      vitamin E 1000 UNIT capsule Take 1,000 Units by mouth once daily.      VYVANSE 40 mg Cap Take 40 mg by mouth once daily.      aspirin 81 MG Chew Take 1 tablet (81 mg total) by mouth once daily. 30 tablet 0    [DISCONTINUED] furosemide (LASIX) 20 MG tablet TAKE 1 TABLET(20 MG) BY MOUTH EVERY DAY 90 tablet 1    [DISCONTINUED] QUEtiapine (SEROQUEL) 200 MG Tab Take 1 tablet (200 mg total) by mouth before breakfast. 30 tablet 2     No current facility-administered medications on file prior to visit.       Allergies   Review of patient's allergies indicates:   Allergen Reactions    Morphine Other (See Comments)     Patient had a psychotic episode after taking Morphine  Agitation, hallucinations  Other Reaction(s): Other (See Comments), Other (See Comments)    Patient had a psychotic episode after taking Morphine    Patient had a psychotic episode after taking Morphine Agitation, hallucinations    Penicillins Anaphylaxis     Tolerated cephalosporins in the past    Januvia [sitagliptin] Hives    Carbamazepine Other (See Comments)     hyponatremia  Other Reaction(s): Other (See Comments)    hyponatremia       Review of Systems (Pertinent positives)  Review of Systems   Constitutional:  Negative for chills and fever.   HENT:  Positive for ear pain. Negative for congestion, ear discharge, hearing loss, nosebleeds and sinus pain.    Eyes: Negative.    Respiratory:  Negative for cough and hemoptysis.    Cardiovascular:  Negative for chest pain, palpitations and orthopnea.   Gastrointestinal: Negative.    Genitourinary: Negative.    Musculoskeletal: Negative.    Skin:  Positive for itching (improving) and rash (improving).    Neurological: Negative.    Psychiatric/Behavioral: Negative.         BP (!) 128/58 (BP Location: Right arm, Patient Position: Sitting, BP Method: Large (Manual))   Pulse 75   Temp 98.4 °F (36.9 °C) (Oral)   LMP 11/01/2011 (LMP Unknown) Comment: stated when she was 39  SpO2 95%     Physical Exam  Vitals reviewed.   Constitutional:       General: She is not in acute distress.     Appearance: Normal appearance. She is not ill-appearing, toxic-appearing or diaphoretic.   HENT:      Head: Normocephalic and atraumatic.      Comments: Few healing sores noted on face.  No significant erythema.     Right Ear: Tympanic membrane and external ear normal. Tenderness present. No laceration or drainage. Tympanic membrane is not injected, scarred, perforated or erythematous.      Left Ear: Tympanic membrane and external ear normal. Tenderness present. No laceration or drainage. Tympanic membrane is not injected, scarred, perforated or erythematous.      Ears:      Comments: Bilateral ear canals exhibit erythema.  No purulent drainage.  Tenderness with insertion of otoscope laterally  Cardiovascular:      Rate and Rhythm: Normal rate and regular rhythm.      Pulses: Normal pulses.      Heart sounds: Normal heart sounds.   Pulmonary:      Effort: Pulmonary effort is normal. No respiratory distress.      Breath sounds: Normal breath sounds. No wheezing.   Skin:     General: Skin is warm and dry.      Capillary Refill: Capillary refill takes less than 2 seconds.   Neurological:      General: No focal deficit present.      Mental Status: She is alert and oriented to person, place, and time.   Psychiatric:         Mood and Affect: Mood normal.         Behavior: Behavior normal.          Assessment/Plan:  Audrey Natarajan is a 53 y.o. female who presents today for :    Diagnoses and all orders for this visit:    Chronic obstructive pulmonary disease, unspecified COPD type  -     Ambulatory referral/consult to Sleep Disorders;  Future    Clinically stable.  Will consult pulmonologist Dr. Quiros for evaluation for COPD and sleep apnea.    Sleep apnea, unspecified type  -     Ambulatory referral/consult to Sleep Disorders; Future    As above.  Needs to reestablish with sleep Medicine to discuss getting new CPAP supplies.    Clostridium perfringens infection  -     Ambulatory referral/consult to Infectious Disease; Future    Clinically much improved.  Facial cellulitis improving almost to resolution.  Will consult ID for hospital follow-up.    Acute otitis externa of both ears, unspecified type  -     ciprofloxacin-dexAMETHasone 0.3-0.1% (CIPRODEX) 0.3-0.1 % DrpS; Place 4 drops into both ears 2 (two) times daily. for 7 days    Treat with Ciprodex given bilateral otitis.    Essential hypertension    BP controlled on current meds.  Continue.    Type II diabetes mellitus with neurological manifestations  -     COMPREHENSIVE METABOLIC PANEL; Future  -     HEMOGLOBIN A1C; Future    Patient's Ozempic and mealtime insulin will recently increased.  Recheck A1c with labs in July.        This office note has been dictated.  This dictation has been generated using M-Modal Fluency Direct dictation; some phonetic errors may occur.

## 2024-05-13 ENCOUNTER — OFFICE VISIT (OUTPATIENT)
Dept: VASCULAR SURGERY | Facility: CLINIC | Age: 54
End: 2024-05-13
Payer: MEDICAID

## 2024-05-13 ENCOUNTER — TELEPHONE (OUTPATIENT)
Dept: VASCULAR SURGERY | Facility: CLINIC | Age: 54
End: 2024-05-13

## 2024-05-13 VITALS
WEIGHT: 231.5 LBS | DIASTOLIC BLOOD PRESSURE: 77 MMHG | HEART RATE: 83 BPM | BODY MASS INDEX: 37.21 KG/M2 | SYSTOLIC BLOOD PRESSURE: 132 MMHG | HEIGHT: 66 IN

## 2024-05-13 DIAGNOSIS — M79.606 PAIN OF LOWER EXTREMITY, UNSPECIFIED LATERALITY: ICD-10-CM

## 2024-05-13 DIAGNOSIS — I89.0 LYMPHEDEMA: Primary | ICD-10-CM

## 2024-05-13 DIAGNOSIS — R60.0 LEG EDEMA, RIGHT: ICD-10-CM

## 2024-05-13 DIAGNOSIS — I87.2 VENOUS INSUFFICIENCY: ICD-10-CM

## 2024-05-13 PROCEDURE — 99999 PR PBB SHADOW E&M-EST. PATIENT-LVL IV: CPT | Mod: PBBFAC,,, | Performed by: NURSE PRACTITIONER

## 2024-05-13 PROCEDURE — 3008F BODY MASS INDEX DOCD: CPT | Mod: CPTII,,, | Performed by: NURSE PRACTITIONER

## 2024-05-13 PROCEDURE — 3072F LOW RISK FOR RETINOPATHY: CPT | Mod: CPTII,,, | Performed by: NURSE PRACTITIONER

## 2024-05-13 PROCEDURE — 99214 OFFICE O/P EST MOD 30 MIN: CPT | Mod: PBBFAC | Performed by: NURSE PRACTITIONER

## 2024-05-13 PROCEDURE — 4010F ACE/ARB THERAPY RXD/TAKEN: CPT | Mod: CPTII,,, | Performed by: NURSE PRACTITIONER

## 2024-05-13 PROCEDURE — 3075F SYST BP GE 130 - 139MM HG: CPT | Mod: CPTII,,, | Performed by: NURSE PRACTITIONER

## 2024-05-13 PROCEDURE — 3078F DIAST BP <80 MM HG: CPT | Mod: CPTII,,, | Performed by: NURSE PRACTITIONER

## 2024-05-13 PROCEDURE — 1159F MED LIST DOCD IN RCRD: CPT | Mod: CPTII,,, | Performed by: NURSE PRACTITIONER

## 2024-05-13 PROCEDURE — 1111F DSCHRG MED/CURRENT MED MERGE: CPT | Mod: CPTII,,, | Performed by: NURSE PRACTITIONER

## 2024-05-13 PROCEDURE — 99215 OFFICE O/P EST HI 40 MIN: CPT | Mod: S$PBB,,, | Performed by: NURSE PRACTITIONER

## 2024-05-13 PROCEDURE — 3066F NEPHROPATHY DOC TX: CPT | Mod: CPTII,,, | Performed by: NURSE PRACTITIONER

## 2024-05-13 PROCEDURE — 3060F POS MICROALBUMINURIA REV: CPT | Mod: CPTII,,, | Performed by: NURSE PRACTITIONER

## 2024-05-13 PROCEDURE — 3046F HEMOGLOBIN A1C LEVEL >9.0%: CPT | Mod: CPTII,,, | Performed by: NURSE PRACTITIONER

## 2024-05-13 NOTE — PROGRESS NOTES
[facility]                         05/13/2024    HPI:  Audrey Natarajan is a 53 y.o. female with   Patient Active Problem List   Diagnosis    Type II diabetes mellitus with neurological manifestations    Essential hypertension    Facial cellulitis    COPD (chronic obstructive pulmonary disease)    Hyperlipidemia    Tobacco abuse    Mild protein malnutrition    Diabetic neuropathy    Uncontrolled diabetes mellitus with hyperglycemia, with long-term current use of insulin    Leg swelling    Incisional hernia without mention of obstruction or gangrene    DM type 2 without retinopathy    History of DVT (deep vein thrombosis)    History of pulmonary embolus (PE)    Bipolar 1 disorder    Gastroparesis due to DM    Cellulitis of left lower extremity    Thrombocytosis    Class 2 severe obesity with serious comorbidity and body mass index (BMI) of 37.0 to 37.9 in adult    Type 2 diabetes mellitus    Other chronic pain    Nuclear sclerosis of both eyes    Bilateral ocular hypertension    Refractive error    Long term (current) use of anticoagulants    History of pulmonary embolism    DVT, recurrent, lower extremity, chronic, left    Decreased ROM of ankle    Decreased strength of lower extremity    Balance problem    Gait abnormality    Fatty liver    Hepatomegaly    History of bariatric surgery    Need for prophylactic vaccination against hepatitis A and hepatitis B    Liver fibrosis    History of diabetic ulcer of foot    Osteomyelitis of left foot    Acute exacerbation of psychosis    Diabetic ulcer of left midfoot associated with type 2 diabetes mellitus, limited to breakdown of skin    Psychosis    SHAWN (obstructive sleep apnea)    Insomnia    Chronic deep vein thrombosis (DVT) of both lower extremities    Diabetic foot ulcer associated with type 2 diabetes mellitus    Lymphadenopathy, inguinal    Hyponatremia    Osteomyelitis of right foot    Iron deficiency anemia     "Cellulitis of left foot    PAD (peripheral artery disease)    Left midfoot ulcer, with necrosis of muscle    Charcot's joint of foot    Open wound of left foot    Lightheaded    Stump neuralgia    Impaired functional mobility, balance, gait, and endurance    Preseptal cellulitis    Elevated lactic acid level    Abscess of scalp    Bacteremia    Hypomagnesemia    Metabolic alkalosis    being managed by PCP and specialists who is here today for evaluation of BLE pain.  9/1/19 presented to ER due to RLE cramping and LLE edema.  S/p LLE DVT 2003, unprovoked and treated with anticoagulation.  S/p PE and L femoral and PT DVT 2013 and had a [place] retrievable filter placed 2013; has had persistent pain and edema since that time.  Repeat US 9/1/19 in ER due to pain/edema showed chronic L PT.  Pt states 2013 after she injured her LLE and had a bleeding vein she had a "vein stripping" to the outside of her LLE.  Patient states location is BLE occurring for 6 yrs.  Associated signs and symptoms include discoloration.  Quality is sharp/throbbing and severity is 10/10 at worst, average 7/10.  Symptoms began 6 yrs ago.  Alleviating factors include elevation.  Worsening factors include dependency.  Denies claudication.      no MI  no Stroke  Tobacco use: 3 cig/day; prev 1 ppd x 35 yrs    12/2019: c/o severe LLE pain.  +compression daily.  C/o stockings being too tight.  States bilateral foot paresthesias.      3/2020:  Cont to c/o LLE pain.  Cannot tolerate compression.      5/2020:  C/o sharp LLE pain that limits her ambulating and sleep despite OTC pain medications.      8/2020:  Cont c/o LLE MSK and neuropathic pain.  +edema.  +compression/elevation > 3 months with continued decreased ability to perform ADLs and ambulation.    12/2020:  Cont to c/o LLE edema, pain despite compression and elevation > 3 mo.  Limiting her ADLs.    6/2021:  Presents with persistent BLE pain, edema and heaviness despite compression, elevation and diet " changes > 3 mo limiting her ADLs.    9/2021:  S/p L GSV EVLT 7/30/21.  LLE feels better.    1/2023:  s/p L foot surgery.  C/o severe LLE and L foot pain.    4/2024:  c/o RLE edema and pain despite compression and elevation > 3 mo.    05/13/24: Pt with c/o of RLE edema and pain, with pain also to LLE BKA.     Past Medical History:   Diagnosis Date    ADHD (attention deficit hyperactivity disorder)     Arthritis     Asthma     Bipolar 1 disorder     Cataract     Cigarette smoker     COPD (chronic obstructive pulmonary disease)     Coronary artery disease     A fib    Depression     bipolar manic depresson    Diabetes mellitus     Diabetic foot ulcers     Diabetic neuropathy     DVT of lower extremity, bilateral 07/2013    bilateral LE DVT. [place] filter placed.     Encounter for blood transfusion     History of blood clots 1. Left Leg=2003; 2.Bilateral Groin=Blood Clots= 5 or 6/ 2013 & 7/2013; 3. LLL of Lung=7/2013;  4. Lt. Lower Leg=7/2013.     Pt. had 1st Blood Clot after [id number], & Last=2013. [place] Filter= Rt.Lateral Neck.    HTN (hypertension) 06/06/2013    Pt states that she does not have hypertension    Hypercholesteremia     Irregular heartbeat     Neuromuscular disorder     neuropathy feet    Obese     PE (pulmonary embolism) 07/2013    bilat LE DVT.     Restless leg syndrome      Past Surgical History:   Procedure Laterality Date    ABDOMINAL SURGERY  2010    gastric sleeve    BELOW KNEE AMPUTATION OF LOWER EXTREMITY Left 4/19/2023    Procedure: AMPUTATION, BELOW KNEE;  Surgeon: Gabe Munoz MD;  Location: [facility] OR;  Service: General;  Laterality: Left;  RN PREOP 4/11/2023    BILATERAL OOPHORECTOMY Bilateral 1/12/2015    CHOLECYSTECTOMY      DEBRIDEMENT OF FOOT Bilateral 5/10/2022    Procedure: DEBRIDEMENT, FOOT;  Surgeon: Maira De Los Santos DPM;  Location: [facility] OR;  Service: Podiatry;  Laterality: Bilateral;    DEBRIDEMENT OF FOOT Left 2/28/2023    Procedure: DEBRIDEMENT, FOOT,biopsy;   "Surgeon: Maira De Los Santos DPM;  Location: [facility] OR;  Service: Podiatry;  Laterality: Left;  request misonix, wound VAC, possible neoxx    Green[place] filter Right 2012    Right Neck & Tunneled Down.    HERNIA REPAIR      "[place] of Hernias Repaires around th [place].", pt. states    INCISION AND DRAINAGE FOOT Left 2022    Procedure: INCISION AND DRAINAGE, FOOT;  Surgeon: Fahad Razo DPM;  Location: [facility] OR;  Service: Podiatry;  Laterality: Left;    LAPAROSCOPIC CHOLECYSTECTOMY N/A 9/10/2020    Procedure: CHOLECYSTECTOMY, LAPAROSCOPIC;  Surgeon: Montrell Gutierrez MD;  Location: [facility] OR;  Service: General;  Laterality: N/A;  RN PREOP ----COVID Negative      OVARIAN CYST REMOVAL  3/13/2014    ND REMOVAL OF OVARY/TUBE(S)      SPLENECTOMY, TOTAL  2003    TONSILLECTOMY      as a child    TYMPANOSTOMY TUBE PLACEMENT      VEIN SURGERY      Lt leg     Family History   Problem Relation Name Age of Onset    Hypertension Father      Diabetes Father      Heart disease Father      Cataracts Father      Diabetes Paternal Grandfather      Heart disease Paternal Grandfather      No Known Problems Mother      Ovarian cancer Maternal Grandmother           from this. ? age     No Known Problems Sister      No Known Problems Brother      No Known Problems Maternal Aunt      No Known Problems Maternal Uncle      No Known Problems Paternal Aunt      No Known Problems Paternal Uncle      No Known Problems Maternal Grandfather      Ovarian cancer Paternal Grandmother      Uterine cancer Neg Hx      Breast cancer Neg Hx      Colon cancer Neg Hx      Amblyopia Neg Hx      Blindness Neg Hx      Cancer Neg Hx      Glaucoma Neg Hx      Macular degeneration Neg Hx      Retinal detachment Neg Hx      Strabismus Neg Hx      Stroke Neg Hx      Thyroid disease Neg Hx       Social History     Socioeconomic History    Marital status: Significant Other   Tobacco Use    Smoking status: Former     Current packs/day: 0.00    "  Average packs/day: 0.5 packs/day for 37.0 years (18.5 ttl pk-yrs)     Types: Cigarettes     Start date: 12/6/1983     Quit date: 12/6/2020     Years since quitting: 3.4    Smokeless tobacco: Current    Tobacco comments:     Enrolled in the [facility] on 5/3/14 ([facility] Member ID # [id number]). Ambulatory referral to Smoking Cessation Program   Substance and Sexual Activity    Alcohol use: No     Alcohol/week: 0.0 standard drinks of alcohol    Drug use: No    Sexual activity: Yes     Partners: Male   Social History Narrative     from her  of 20 years    Lives by herself since 2019     Social Determinants of Health     Financial Resource Strain: Medium Risk (3/6/2023)    Overall Financial Resource Strain (CARDIA)     Difficulty of Paying Living Expenses: Somewhat hard   Food Insecurity: Food Insecurity Present (3/6/2023)    Hunger Vital Sign     Worried About Running Out of Food in the Last Year: Often true     Ran Out of Food in the Last Year: Often true   Transportation Needs: Unmet Transportation Needs (3/6/2023)    PRAPARE - Transportation     Lack of Transportation (Medical): Yes     Lack of Transportation (Non-Medical): Yes   Physical Activity: Inactive (3/6/2023)    Exercise Vital Sign     Days of Exercise per Week: 0 days     Minutes of Exercise per Session: 0 min   Stress: Stress Concern Present (3/6/2023)    [employer] - Occupational Stress Questionnaire     Feeling of Stress : To some extent   Housing Stability: Low Risk  (3/20/2023)    Housing Stability Vital Sign     Unable to Pay for Housing in the Last Year: No     Number of Places Lived in the Last Year: 1     Unstable Housing in the Last Year: No       Current Outpatient Medications:     albuterol (PROVENTIL/VENTOLIN HFA) 90 mcg/actuation inhaler, INHALE 2 PUFFS INTO THE LUNGS EVERY 6 HOURS AS NEEDED FOR WHEEZING. RESCUE, Disp: 6.7 g, Rfl: 2    albuterol-ipratropium (DUO-NEB) 2.5 mg-0.5 mg/3 mL nebulizer  solution, Take 3 mLs by nebulization every 6 (six) hours as needed for Wheezing or Shortness of Breath. Rescue, Disp: 60 mL, Rfl: 0    ammonium lactate (LAC-HYDRIN) 12 % lotion, APPL Y ONCE TOPICALLY TWICE DAILY FOR 30 DAYS, Disp: 225 g, Rfl: 0    atorvastatin (LIPITOR) 40 MG tablet, Take 1 tablet (40 mg total) by mouth once daily., Disp: 90 tablet, Rfl: 3    BIOFREEZE, MENTHOL, TOP, Apply topically., Disp: , Rfl:     blood sugar diagnostic Strp, To check BG three times daily, to use with insurance preferred meter, Disp: 300 each, Rfl: 3    blood-glucose meter (TRUE METRIX GLUCOSE METER) Misc, USE TO TEST BLOOD GLUCOSE THREE TIMES DAILY AS DIRECTED, Disp: 1 each, Rfl: 0    bumetanide (BUMEX) 1 MG tablet, Take 1 tablet (1 mg total) by mouth once daily., Disp: 90 tablet, Rfl: 3    ciprofloxacin-dexAMETHasone 0.3-0.1% (CIPRODEX) 0.3-0.1 % DrpS, Place 4 drops into both ears 2 (two) times daily. for 7 days, Disp: 7.5 mL, Rfl: 0    cyanocobalamin (VITAMIN B-12) 1000 MCG tablet, Take 100 mcg by mouth once daily., Disp: , Rfl:     diclofenac sodium (VOLTAREN) 1 % Gel, Apply 2 g topically 2 (two) times daily., Disp: 100 g, Rfl: 3    divalproex (DEPAKOTE) 500 MG TbEC, Take 1 tablet (500 mg total) by mouth every evening., Disp: 30 tablet, Rfl: 11    DUPIXENT  mg/2 mL PnIj, Inject into the skin every 14 (fourteen) days., Disp: , Rfl:     ELIQUIS 5 mg Tab, TAKE 1 TABLET(5 MG) BY MOUTH TWICE DAILY, Disp: 60 tablet, Rfl: 2    ferrous sulfate 325 (65 FE) MG EC tablet, Take 1 tablet (325 mg total) by mouth once daily., Disp: 30 tablet, Rfl: 0    fluconazole (DIFLUCAN) 150 MG Tab, Take 1 tablet (150 mg total) by mouth once daily. May take second tab po two days after first if symptoms persist, Disp: 2 tablet, Rfl: 0    fluticasone propionate (FLONASE) 50 mcg/actuation nasal spray, 2 sprays (100 mcg total) by Each Nostril route once daily., Disp: 48 mL, Rfl: 0    fluticasone-salmeterol diskus inhaler 250-50 mcg, Inhale 1 puff  into the lungs 2 (two) times daily. Controller, Disp: 60 each, Rfl: 2    gabapentin (NEURONTIN) 300 MG capsule, Take 2 capsules (600 mg total) by mouth 3 (three) times daily., Disp: 180 capsule, Rfl: 2    HYDROcodone-acetaminophen (NORCO)  mg per tablet, Take 1 tablet by mouth every 6 (six) hours as needed for Pain., Disp: 15 tablet, Rfl: 0    hydrOXYzine HCL (ATARAX) 25 MG tablet, Take 1 tablet (25 mg total) by mouth every 6 (six) hours as needed for itching or anxiety., Disp: 20 tablet, Rfl: 0    insulin aspart U-100 (NOVOLOG) 100 unit/mL (3 mL) InPn pen, Inject 7 Units into the skin 3 (three) times daily., Disp: , Rfl:     insulin aspart U-100 (NOVOLOG) 100 unit/mL (3 mL) InPn pen, Inject 0-5 Units into the skin before meals and at bedtime as needed (Hyperglycemia). **LOW CORRECTION DOSE** Blood Glucose mg/dL                  Pre-meal             151-200                0 unit                   201-250                2 units                   251-300                3 units                    301-350                4 units                   >350                     5 units                    Administer subcutaneously if needed at times designated by monitoring schedule., Disp: , Rfl:     insulin detemir U-100, Levemir, 100 unit/mL (3 mL) SubQ InPn pen, Inject 17 Units into the skin 2 (two) times daily., Disp: , Rfl:     lancets Misc, To check BG three times daily, to use with insurance preferred meter, Disp: 300 each, Rfl: 3    LIDOcaine (LIDODERM) 5 %, UNWRAP AND APPLY 1 PATCH TO SKIN ONCE DAILY. REMOVE AFTER 12 HOURS, Disp: 15 patch, Rfl: 1    lisinopriL 10 MG tablet, Take 1 tablet (10 mg total) by mouth every evening., Disp: 90 tablet, Rfl: 0    loratadine (CLARITIN) 10 mg tablet, TAKE 1 TABLET BY MOUTH DAILY, Disp: 90 tablet, Rfl: 3    meloxicam (MOBIC) 7.5 MG tablet, Take 1 tablet (7.5 mg total) by mouth once daily., Disp: 30 tablet, Rfl: 1    metFORMIN (GLUCOPHAGE) 1000 MG tablet, Take 1 tablet (1,000  "mg total) by mouth 2 (two) times daily with meals., Disp: 180 tablet, Rfl: 1    methocarbamoL (ROBAXIN) 500 MG Tab, TAKE 1 TABLET(500 MG) BY MOUTH THREE TIMES DAILY as needed for back pain, Disp: 45 tablet, Rfl: 1    metoprolol tartrate (LOPRESSOR) 25 MG tablet, Take 1 tablet (25 mg total) by mouth 2 (two) times daily., Disp: 180 tablet, Rfl: 3    metroNIDAZOLE (FLAGYL) 500 MG tablet, Take 1 tablet (500 mg total) by mouth every 8 (eight) hours. for 13 days, Disp: 39 tablet, Rfl: 0    multivitamin Tab, Take 1 tablet by mouth once daily., Disp: 30 tablet, Rfl: 2    nystatin (NYSTOP) powder, APPLY TO ABDOMINAL AND BREAST SKIN FOLD TWICE DAILY, Disp: 60 g, Rfl: 2    pantoprazole (PROTONIX) 40 MG tablet, TAKE 1 TABLET(40 MG) BY MOUTH EVERY DAY, Disp: 90 tablet, Rfl: 3    pen needle, diabetic 32 gauge x 5/32" Ndle, USE TO INJECT INSULIN FOUR TIMES DAILY AS DIRECTED, Disp: 100 each, Rfl: 1    risperiDONE (RISPERDAL) 3 MG Tab, Take 1 tablet (3 mg total) by mouth in the morning and 1 tablet (3 mg total) in the evening. Indications: Mood., Disp: 60 tablet, Rfl: 0    semaglutide (OZEMPIC) 2 mg/dose (8 mg/3 mL) PnIj, Inject 2 mg into the skin every 7 days., Disp: 3 mL, Rfl: 11    traMADoL (ULTRAM) 50 mg tablet, Take 1 tablet (50 mg total) by mouth every 8 (eight) hours as needed for Pain., Disp: , Rfl:     vitamin E 1000 UNIT capsule, Take 1,000 Units by mouth once daily., Disp: , Rfl:     VYVANSE 40 mg Cap, Take 40 mg by mouth once daily., Disp: , Rfl:     aspirin 81 MG Chew, Take 1 tablet (81 mg total) by mouth once daily., Disp: 30 tablet, Rfl: 0    REVIEW OF SYSTEMS:  General: No fevers or chills; ENT: No sore throat; Allergy and Immunology: no persistent infections; Hematological and Lymphatic: No history of bleeding or easy bruising; Endocrine: negative; Respiratory: no cough, shortness of breath, or wheezing; Cardiovascular: no chest pain or dyspnea on exertion; Gastrointestinal: no abdominal pain/back, change in bowel " "habits, or bloody stools; Genito-Urinary: no dysuria, trouble voiding, or hematuria; Musculoskeletal: negative; Neurological: no TIA or stroke symptoms; Psychiatric: no nervousness, anxiety or depression.    PHYSICAL EXAM:      Pulse: 83         General appearance:  Alert, well-appearing, and in no distress.  Oriented to person, place, and time                    Neurological: Normal speech, no focal findings noted; CN II - XII grossly intact. RLE with sensation to light touch           Musculoskeletal: Digits/nail without cyanosis/clubbing.  Strength 5/5 BLE.                    Neck: Supple, no significant adenopathy, no carotid bruit can be auscultated                  Chest:  No use of accessory muscles               Cardiac: Normal rate             Abdomen: Soft      Extremities:        2+ R DP pulse     1+ RLE edema    Skin: RLE without wounds; L BKA    LAB RESULTS:  No results found for: "CBC"  Lab Results   Component Value Date    LABPROT 10.2 10/07/2023    INR 1.0 10/07/2023     Lab Results   Component Value Date     05/04/2024    K 4.4 05/04/2024    CL 96 05/04/2024    CO2 31 (H) 05/04/2024     (H) 05/04/2024    BUN 17 05/04/2024    CREATININE 0.9 05/04/2024    CALCIUM 8.6 (L) 05/04/2024    ANIONGAP 11 05/04/2024    EGFRNONAA >60 05/12/2022     Lab Results   Component Value Date    WBC 10.33 05/04/2024    RBC 4.26 05/04/2024    HGB 11.8 (L) 05/04/2024    HCT 38.4 05/04/2024    MCV 90 05/04/2024    MCH 27.7 05/04/2024    MCHC 30.7 (L) 05/04/2024    RDW 14.2 05/04/2024     05/04/2024    MPV 11.0 05/04/2024    GRAN 2.2 05/04/2024    GRAN 21.6 (L) 05/04/2024    LYMPH 5.8 (H) 05/04/2024    LYMPH 55.8 (H) 05/04/2024    MONO 1.3 (H) 05/04/2024    MONO 12.6 05/04/2024    EOS 0.7 (H) 05/04/2024    BASO 0.19 05/04/2024    EOSINOPHIL 7.1 05/04/2024    BASOPHIL 1.8 05/04/2024    DIFFMETHOD Automated 05/04/2024     .  Lab Results   Component Value Date    HGBA1C 11.0 (H) 04/22/2024 "       IMAGING:  All pertinent imaging has been reviewed and interpreted independently.    Venous US 9/2019: Left 1 of 2 PT vein with thrombus present, similar to previous US     9/16/19 JEYSON:  1. Right lower extremity pressures and waveforms indicate no hemodynamically significant arterial occlusive disease.  2. Left lower extremity pressures and waveforms indicate no hemodynamically significant arterial occlusive disease.     Venous reflux 12/12/19: RLE with GSV reflux at distal thigh to calf.  No DVT.    DVT LLE US 12/16/19: No LLE DVT    LLE venous US 3/2020:  Left lower extremity venous ultrasound shows greater saphenous vein reflux at the distal thigh.  There is popliteal vein reflux.  There is no lesser saphenous vein reflux.  There is no anterior accessory saphenous venous reflux.  There is no DVT.    11/2020:  1. Right lower extremity venous ultrasound shows greater saphenous vein reflux at the knee and calf.  There is no lesser saphenous vein reflux.  There is no anterior accessory saphenous venous reflux.  There is no DVT.  2. Left lower extremity venous ultrasound shows greater saphenous vein reflux at the saphenofemoral junction and the distal thigh.  There is popliteal vein reflux.  There is no lesser saphenous vein reflux.  There is no anterior accessory saphenous venous reflux.  There is no DVT.    Venous Insuff 05/06/24:  FINDINGS:  Grayscale and color Doppler images reveal nonocclusive DVT in the left popliteal vein.  This appears echogenic and somewhat peripheral, suggesting chronic nature.  This was not included in the evaluation on only available prior post BKA dated 05/06/2023.  Remaining deep veins of both lower extremities are patent.  Evaluation of the superficial veins demonstrates significant reflux on the left greater saphenous vein 1239 milliseconds in the distal thigh and 2437 milliseconds in the knee.  Superficial vein measurements are as follows:     RIGHT     GSV proximal: 10 mm      GSV mid: 6mm     GSV at knee:5 mm     SSV: 4 mm     LEFT     GSV proximal: 4 mm     GSV mid: 3 mm     GSV at knee: 3     Left lower leg was not evaluated due to below the knee amputation.     Impression:     1. Nonocclusive DVT involving the left popliteal vein.  This is favored to be chronic due to its echogenic appearance and location of the periphery of the vein although acute DVT cannot be entirely excluded.  2. Reflux in the left greater saphenous vein.  3. Superficial venous measurements as above.    IMP/PLAN:  53 y.o. female with   Patient Active Problem List   Diagnosis    Type II diabetes mellitus with neurological manifestations    Essential hypertension    Facial cellulitis    COPD (chronic obstructive pulmonary disease)    Hyperlipidemia    Tobacco abuse    Mild protein malnutrition    Diabetic neuropathy    Uncontrolled diabetes mellitus with hyperglycemia, with long-term current use of insulin    Leg swelling    Incisional hernia without mention of obstruction or gangrene    DM type 2 without retinopathy    History of DVT (deep vein thrombosis)    History of pulmonary embolus (PE)    Bipolar 1 disorder    Gastroparesis due to DM    Cellulitis of left lower extremity    Thrombocytosis    Class 2 severe obesity with serious comorbidity and body mass index (BMI) of 37.0 to 37.9 in adult    Type 2 diabetes mellitus    Other chronic pain    Nuclear sclerosis of both eyes    Bilateral ocular hypertension    Refractive error    Long term (current) use of anticoagulants    History of pulmonary embolism    DVT, recurrent, lower extremity, chronic, left    Decreased ROM of ankle    Decreased strength of lower extremity    Balance problem    Gait abnormality    Fatty liver    Hepatomegaly    History of bariatric surgery    Need for prophylactic vaccination against hepatitis A and hepatitis B    Liver fibrosis    History of diabetic ulcer of foot    Osteomyelitis of left foot    Acute exacerbation of psychosis     Diabetic ulcer of left midfoot associated with type 2 diabetes mellitus, limited to breakdown of skin    Psychosis    SHAWN (obstructive sleep apnea)    Insomnia    Chronic deep vein thrombosis (DVT) of both lower extremities    Diabetic foot ulcer associated with type 2 diabetes mellitus    Lymphadenopathy, inguinal    Hyponatremia    Osteomyelitis of right foot    Iron deficiency anemia    Cellulitis of left foot    PAD (peripheral artery disease)    Left midfoot ulcer, with necrosis of muscle    Charcot's joint of foot    Open wound of left foot    Lightheaded    Stump neuralgia    Impaired functional mobility, balance, gait, and endurance    Preseptal cellulitis    Elevated lactic acid level    Abscess of scalp    Bacteremia    Hypomagnesemia    Metabolic alkalosis    being managed by PCP and specialists who is here today for evaluation of BLE pain and edema.    - LLE BKA with nonocclusive DVT of left popliteal vein and reflux. RLE no evidence of reflu.  -LLE pain and edema likely due to post thrombotic syndrome, no DVT on repeat venous duplex US with L distal thigh GSV reflux -recommend compression with Rx stockings, elevation, dietary changes associated with water and sodium intake discussed at length with patient   -R GSV distal reflux symptomatic although not lifestyle limiting - recommend compression with Rx stockings, elevation, dietary changes associated with water and sodium intake discussed at length with patient  -Rec cont exercise  -Rec optimization of neuropathic and MSK pain with NSGY, Pain mgmt and Rheumatology - cont Neuro and NSGY recs  -Cont Hematology for comprehensive mgmt of thrombotic events  -Lymphedema clinic and pumps - Patient has tried and failed compression of 20-30 mm Hg, elevation and exercise for >1 month period.  Basic pump trial performed and discontinued due to sensitivity and pain to static pressure.  Symptoms of swelling, pain and hyperplasia present   -venous insuff and  JEYSON  -RTC in 6 months for further eval     I spent 30 minutes evaluating this patient and greater than 50% of the time was spent counseling, coordinator care and discussing the plan of care.  All questions were answered and patient stated understanding with agreement with the above treatment plan.    Jazmin Lock NP  Vascular and Endovascular Surgery

## 2024-05-14 NOTE — ASSESSMENT & PLAN NOTE
"Patient has Abnormal Magnesium: hypomagnesemia. Will continue to monitor electrolytes closely. Will replace the affected electrolytes and repeat labs to be done after interventions completed. The patient's magnesium results have been reviewed and are listed below.  No results for input(s): "MG" in the last 24 hours.       -Mg level 1.3 on admission  -replace as needed  "

## 2024-05-14 NOTE — ASSESSMENT & PLAN NOTE
-blood cultures from admission on 04/30 with Clostridium perfringens  -she reported diarrhea/abd pain before admit but none since admitted  -repeat blood culture from 05/01 with no growth   -urine culture with Streptococcus B  -echocardiogram ordered - no obvious valvular dz  -continue vancomycin; add flagyl  -Infectious Disease consulted - recommend 2 weeks flagyl therapy  -CT abd suggested by ID but patient did not wish to pursue inpatient and requested discharge; C-scope referral placed  -f/u with ID for vaccines

## 2024-05-14 NOTE — ASSESSMENT & PLAN NOTE
A1c:   Lab Results   Component Value Date    HGBA1C 11.0 (H) 04/22/2024     Meds: basal bolus insulin + SSI PRN to maintain goal 140-180  ADA diet, accuchecks ACHS, hypoglycemic protocol  -home regimen as follows:  Levemir 15 units b.i.d., NovoLog 5 units 3 times daily  -continues to have hyperglycemia, likely uncontrolled given A1c 11 and also exacerbated in setting of infection  -titrate insulin as needed  -current regimen while inpatient:  increased to Basal insulin at 30 units b.i.d., prandial insulin 14 units TIDWM.   -continue SSI at home
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
## 2024-05-14 NOTE — ASSESSMENT & PLAN NOTE
Chronic, controlled.     Latest blood pressure and vitals reviewed-      .   Home meds for hypertension were reviewed and noted below.   Hypertension Medications               bumetanide (BUMEX) 1 MG tablet Take 1 tablet (1 mg total) by mouth once daily.    lisinopriL 10 MG tablet Take 1 tablet (10 mg total) by mouth once daily.    metoprolol tartrate (LOPRESSOR) 25 MG tablet Take 1 tablet (25 mg total) by mouth 2 (two) times daily.            While in the hospital, will manage blood pressure as follows; Continue home antihypertensive regimen    Will utilize p.r.n. blood pressure medication only if patient's blood pressure greater than 180/110 and she develops symptoms such as worsening chest pain or shortness of breath.

## 2024-05-14 NOTE — ASSESSMENT & PLAN NOTE
CO2 increasing on labs.  Minimize oxygen therapy.  Patient with chronic diuretic therapy and high glucoses suspicious for volume depletion; BNP - 24; gentle IVF.  Improved.

## 2024-05-20 DIAGNOSIS — J44.9 CHRONIC OBSTRUCTIVE PULMONARY DISEASE, UNSPECIFIED COPD TYPE: ICD-10-CM

## 2024-05-20 RX ORDER — ALBUTEROL SULFATE 90 UG/1
2 AEROSOL, METERED RESPIRATORY (INHALATION) EVERY 6 HOURS PRN
Qty: 6.7 G | Refills: 2 | Status: SHIPPED | OUTPATIENT
Start: 2024-05-20

## 2024-05-20 NOTE — TELEPHONE ENCOUNTER
----- Message from Opal Rogers sent at 5/20/2024 12:30 PM CDT -----  Regarding: Self [phone]  Type: RX Refill Request     Who Called: Self     Have you contacted your pharmacy: Yes, out of refills     Refill or New Rx: Refill     RX Name and Strength: albuterol (PROVENTIL/VENTOLIN HFA) 90 mcg/actuation inhaler     How is the patient currently taking it? (ex. 1XDay): almost out     Is this a 30 day or 90 day RX:     Preferred Pharmacy with phone number:   [id number]  [place] [address] AT [address]  [place] [address]  Phone: [phone] Fax: [phone]       Local or Mail Order:     Ordering Provider:     Would the patient rather a call back or a response via [facility]? Call     Best Call Back Number: [phone]      Additional Information:

## 2024-05-20 NOTE — TELEPHONE ENCOUNTER
No care due was identified.  [facility] Embedded Care Due Messages. Reference number: [id number].   5/20/2024 1:56:13 PM CDT

## 2024-05-21 DIAGNOSIS — K21.00 GASTROESOPHAGEAL REFLUX DISEASE WITH ESOPHAGITIS WITHOUT HEMORRHAGE: ICD-10-CM

## 2024-05-21 NOTE — TELEPHONE ENCOUNTER
No care due was identified.  [facility] Embedded Care Due Messages. Reference number: [id number].   5/21/2024 3:40:33 PM CDT

## 2024-05-21 NOTE — TELEPHONE ENCOUNTER
----- Message from [place] MA sent at 5/21/2024  7:39 AM CDT -----  Type: RX Refill Request    Who Called: Self    Have you contacted your pharmacy:no    Refill or New Rx:refill     RX Name and Strength:    pantoprazole (PROTONIX) 40 MG tablet      Preferred Pharmacy with phone number: [facility] #[id number] - [place], [place] - [address] [place] [address] [address] AT [facility]   Phone: [phone]  Fax: [phone]          Local or Mail Order: Local    Ordering Provider:Becky    Would the patient rather a call back or a response via [facility]? Yes, call     Best Call Back Number: [phone] (home)

## 2024-05-22 RX ORDER — PANTOPRAZOLE SODIUM 40 MG/1
40 TABLET, DELAYED RELEASE ORAL DAILY
Qty: 90 TABLET | Refills: 3 | Status: SHIPPED | OUTPATIENT
Start: 2024-05-22 | End: 2024-06-06 | Stop reason: CLARIF

## 2024-05-22 NOTE — TELEPHONE ENCOUNTER
Refill Decision Note   Audrey Natarajan  is requesting a refill authorization.  Brief Assessment and Rationale for Refill:  Approve     Medication Therapy Plan: ED visit for bacteremia. FOVS. Will approve 90 with 3      Extended chart review required: Yes   Comments:     Note composed:11:15 AM 05/22/2024

## 2024-05-24 DIAGNOSIS — G54.6 PHANTOM LIMB PAIN: ICD-10-CM

## 2024-05-24 RX ORDER — TRAMADOL HYDROCHLORIDE 50 MG/1
50 TABLET ORAL EVERY 8 HOURS PRN
Status: CANCELLED
Start: 2024-05-24

## 2024-05-24 RX ORDER — TRAMADOL HYDROCHLORIDE 50 MG/1
50 TABLET ORAL EVERY 8 HOURS PRN
Qty: 30 TABLET | Refills: 0 | Status: SHIPPED | OUTPATIENT
Start: 2024-05-24 | End: 2024-06-14 | Stop reason: SDUPTHER

## 2024-05-24 RX ORDER — MELOXICAM 7.5 MG/1
7.5 TABLET ORAL DAILY
Qty: 30 TABLET | Refills: 1 | Status: SHIPPED | OUTPATIENT
Start: 2024-05-24

## 2024-05-24 RX ORDER — MELOXICAM 7.5 MG/1
7.5 TABLET ORAL DAILY
Qty: 30 TABLET | Refills: 1 | Status: CANCELLED | OUTPATIENT
Start: 2024-05-24

## 2024-05-24 NOTE — TELEPHONE ENCOUNTER
No care due was identified.  [facility] Embedded Care Due Messages. Reference number: [id number].   5/24/2024 10:38:09 AM CDT

## 2024-05-24 NOTE — TELEPHONE ENCOUNTER
----- Message from Marybeth Palomo sent at 5/24/2024 10:10 AM CDT -----  Regarding: self  Type: RX Refill Request     Who Called:self     Have you contacted your pharmacy:     Refill     RX Name and Strength:traMADoL (ULTRAM) 50 mg tablet     Preferred Pharmacy with phone number:   [id number] - [place] [place] [address]  Phone: [phone] Fax: [phone]          Local or Mail Order:self     Would the patient rather a call back or a response via [facility]?call     Best Call Back Number: [phone]       Additional Information:     Thank you.

## 2024-06-03 NOTE — PLAN OF CARE
Problem: Adult Inpatient Plan of Care  Goal: Plan of Care Review  Outcome: Progressing  Flowsheets (Taken 5/1/2024 0510)  Plan of Care Reviewed With: patient     Problem: Diabetes Comorbidity  Goal: Blood Glucose Level Within Targeted Range  Outcome: Progressing  Intervention: Monitor and Manage Glycemia  Flowsheets (Taken 5/1/2024 0510)  Glycemic Management: blood glucose monitored     Problem: Skin Injury Risk Increased  Goal: Skin Health and Integrity  Outcome: Progressing  Intervention: Optimize Skin Protection  Flowsheets (Taken 5/1/2024 0510)  Pressure Reduction Techniques: frequent weight shift encouraged  Head of Bed (HOB) Positioning: HOB elevated      3RD CALL - CALLED PT TO OFFER R/S OF CX'D APPT 5/30 W/ THUAN    THREE ATTEMPTS MADE TO OFFER R/S, ANYTHING ELSE TO DO?

## 2024-06-06 ENCOUNTER — TELEPHONE (OUTPATIENT)
Dept: FAMILY MEDICINE | Facility: CLINIC | Age: 54
End: 2024-06-06
Payer: MEDICAID

## 2024-06-06 DIAGNOSIS — K21.00 GASTROESOPHAGEAL REFLUX DISEASE WITH ESOPHAGITIS WITHOUT HEMORRHAGE: Primary | ICD-10-CM

## 2024-06-06 RX ORDER — OMEPRAZOLE 40 MG/1
40 CAPSULE, DELAYED RELEASE ORAL DAILY
Qty: 90 CAPSULE | Refills: 3 | Status: SHIPPED | OUTPATIENT
Start: 2024-06-06 | End: 2024-06-19

## 2024-06-06 NOTE — TELEPHONE ENCOUNTER
----- Message from Lupe Jhaveri sent at 6/6/2024 12:28 PM CDT -----  .Type: Patient Call Back    Who called: Self     What is the request in detail: Would like to know can you call in a different script for pantoprazole (PROTONIX) 40 MG tablet. Stated insurance doesn't want to pay    Can the clinic reply by [facility]? No     Would the patient rather a call back or a response via [facility]? Call Back     Best call back number: .[phone] (home)       Additional Information:

## 2024-06-13 ENCOUNTER — TELEPHONE (OUTPATIENT)
Dept: FAMILY MEDICINE | Facility: CLINIC | Age: 54
End: 2024-06-13
Payer: MEDICAID

## 2024-06-13 NOTE — TELEPHONE ENCOUNTER
----- Message from Maria E Dietz sent at 6/13/2024 11:05 AM CDT -----  Who called: self        What is the request in detail: pt stated that ins company need PA for omeprazole (PRILOSEC) 40 MG capsule       Can the clinic reply by [facility]? No        Would the patient rather a call back or a response via [facility]? Call back        Best call back number:[phone]

## 2024-06-14 ENCOUNTER — TELEPHONE (OUTPATIENT)
Dept: FAMILY MEDICINE | Facility: CLINIC | Age: 54
End: 2024-06-14
Payer: MEDICAID

## 2024-06-14 DIAGNOSIS — G54.6 PHANTOM LIMB PAIN: ICD-10-CM

## 2024-06-14 RX ORDER — TRAMADOL HYDROCHLORIDE 50 MG/1
50 TABLET ORAL EVERY 8 HOURS PRN
Qty: 30 TABLET | Refills: 0 | Status: SHIPPED | OUTPATIENT
Start: 2024-06-14

## 2024-06-14 NOTE — TELEPHONE ENCOUNTER
----- Message from Meliton Morris sent at 6/14/2024 11:49 AM CDT -----  Regarding: self  Type: Patient Call Back    Who called:self    What is the request in detail:pt is calling to get something similar to omeprazole (PRILOSEC) 40 MG capsule because the pt insurance doesn't cover that one. Would like another brand to be called in    Can the clinic reply by [facility]?no    Would the patient rather a call back or a response via [facility]? callback    Best call back number:[phone]    Additional Information:  [id number] - [place] - [address] [place] [address] [address] [place] [address]  Phone: [phone] Fax: [phone]

## 2024-06-14 NOTE — TELEPHONE ENCOUNTER
----- Message from Laury Diaz sent at 6/14/2024 11:40 AM CDT -----  Type: RX Refill Request    Who Called: self    Have you contacted your pharmacy:no    Refill or New Rx:refill    RX Name and Strength:traMADoL (ULTRAM) 50 mg tablet    Preferred Pharmacy with phone number:[facility][id number] - [place], [place] - [address] [facility] [address] [address] AT [facility]   Phone: [phone]  Fax: [phone]          Local or Mail Order:loc al    Would the patient rather a call back or a response via [facility]? call    Best Call Back Number:[phone] (home)       Additional Information:

## 2024-06-14 NOTE — TELEPHONE ENCOUNTER
No care due was identified.  [facility] Embedded Care Due Messages. Reference number: [id number].   6/14/2024 11:48:02 AM CDT

## 2024-06-19 ENCOUNTER — TELEPHONE (OUTPATIENT)
Dept: FAMILY MEDICINE | Facility: CLINIC | Age: 54
End: 2024-06-19
Payer: MEDICAID

## 2024-06-19 DIAGNOSIS — K21.00 GASTROESOPHAGEAL REFLUX DISEASE WITH ESOPHAGITIS WITHOUT HEMORRHAGE: Primary | ICD-10-CM

## 2024-06-19 RX ORDER — PANTOPRAZOLE SODIUM 40 MG/1
40 TABLET, DELAYED RELEASE ORAL DAILY
Qty: 90 TABLET | Refills: 3 | Status: SHIPPED | OUTPATIENT
Start: 2024-06-19 | End: 2025-06-19

## 2024-06-19 NOTE — TELEPHONE ENCOUNTER
----- Message from Carolina Moseley sent at 6/19/2024  1:06 PM CDT -----  Regarding: patient call back  Type: Patient Call Back    Who called: Self     What is the request in detail: asked for a call back to get PA from doctor for her medicine     Can the clinic reply by [facility]? No     Would the patient rather a call back or a response via [facility]? Call     Best call back number: .[phone]      Additional Information:

## 2024-07-05 DIAGNOSIS — Z86.718 HISTORY OF DVT (DEEP VEIN THROMBOSIS): ICD-10-CM

## 2024-07-05 RX ORDER — APIXABAN 5 MG/1
5 TABLET, FILM COATED ORAL 2 TIMES DAILY
Qty: 60 TABLET | Refills: 2 | Status: SHIPPED | OUTPATIENT
Start: 2024-07-05

## 2024-07-14 DIAGNOSIS — K21.00 GASTROESOPHAGEAL REFLUX DISEASE WITH ESOPHAGITIS WITHOUT HEMORRHAGE: ICD-10-CM

## 2024-07-14 DIAGNOSIS — Z79.4 TYPE 2 DIABETES MELLITUS WITH DIABETIC POLYNEUROPATHY, WITH LONG-TERM CURRENT USE OF INSULIN: ICD-10-CM

## 2024-07-14 DIAGNOSIS — G54.6 PHANTOM LIMB PAIN: ICD-10-CM

## 2024-07-14 DIAGNOSIS — E11.42 TYPE 2 DIABETES MELLITUS WITH DIABETIC POLYNEUROPATHY, WITH LONG-TERM CURRENT USE OF INSULIN: ICD-10-CM

## 2024-07-14 NOTE — TELEPHONE ENCOUNTER
No care due was identified.  [facility] Embedded Care Due Messages. Reference number: [id number].   7/14/2024 1:46:43 PM CDT

## 2024-07-15 RX ORDER — TRAMADOL HYDROCHLORIDE 50 MG/1
50 TABLET ORAL EVERY 8 HOURS PRN
Qty: 30 TABLET | Refills: 0 | Status: SHIPPED | OUTPATIENT
Start: 2024-07-15

## 2024-07-15 RX ORDER — INSULIN DETEMIR 100 [IU]/ML
INJECTION, SOLUTION SUBCUTANEOUS
Qty: 15 ML | OUTPATIENT
Start: 2024-07-15

## 2024-07-15 RX ORDER — PANTOPRAZOLE SODIUM 40 MG/1
40 TABLET, DELAYED RELEASE ORAL DAILY
Qty: 90 TABLET | Refills: 3 | Status: SHIPPED | OUTPATIENT
Start: 2024-07-15 | End: 2025-07-15

## 2024-07-15 NOTE — TELEPHONE ENCOUNTER
----- Message from Krysten Richmond sent at 7/15/2024  1:15 PM CDT -----  Regarding: rx refill  Type: RX Refill Request    Who Called:     Pharmacy Name:     [facility] #[id number] - [place] [address] AT [address]  [place] [address]  Phone: [phone] Fax: [phone]    Refill or New Rx:    RX Name and Strength:traMADoL (ULTRAM) 50 mg tablet, metronidazole (FLAGYL) 500 MG tablet,   pantoprazole (PROTONIX) 40 MG tablet (this medicaTION NEEDS PRIOR AUTH)   How is the patient currently taking it? (ex. 1XDay):    Is this a 30 day or 90 day RX:    Additional Notes:

## 2024-07-17 DIAGNOSIS — Z89.512 S/P BKA (BELOW KNEE AMPUTATION) UNILATERAL, LEFT: ICD-10-CM

## 2024-07-17 RX ORDER — METHOCARBAMOL 500 MG/1
TABLET, FILM COATED ORAL
Qty: 45 TABLET | Refills: 1 | Status: SHIPPED | OUTPATIENT
Start: 2024-07-17

## 2024-07-17 NOTE — TELEPHONE ENCOUNTER
No care due was identified.  [facility] Embedded Care Due Messages. Reference number: [id number].   7/17/2024 9:26:24 AM CDT

## 2024-07-22 DIAGNOSIS — J30.89 NON-SEASONAL ALLERGIC RHINITIS, UNSPECIFIED TRIGGER: ICD-10-CM

## 2024-07-22 RX ORDER — LORATADINE 10 MG/1
10 TABLET ORAL
Qty: 90 TABLET | Refills: 3 | Status: SHIPPED | OUTPATIENT
Start: 2024-07-22

## 2024-07-22 NOTE — TELEPHONE ENCOUNTER
Care Due:                  Date            Visit Type   Department     Provider  --------------------------------------------------------------------------------                                [facility]                              PRIMARY      MEDICINE/  Last Visit: 04-      CARE ([facility])   INTERNAL MED   Donaldo Pena                              [facility]                              PRIMARY      MEDICINE/  Next Visit: 08-      CARE ([facility])   INTERNAL MED   Donaldo Pena                                                            Last  Test          Frequency    Reason                     Performed    Due Date  --------------------------------------------------------------------------------    HBA1C.......  6 months...  metFORMIN, semaglutide...  04-   10-    [facility] Embedded Care Due Messages. Reference number: [id number].   7/22/2024 12:56:29 PM CDT

## 2024-07-22 NOTE — TELEPHONE ENCOUNTER
----- Message from Tennille Monique sent at 7/22/2024 12:57 PM CDT -----  Regarding: Refill request  .Type: RX Refill Request    Who Called:self     Have you contacted your pharmacy:no     Refill or New Rx: Refill    RX Name and Strength:loratadine (CLARITIN) 10 mg tablet    Preferred Pharmacy with phone number:.  [facility] #[id number] [address] [address]  Phone: [phone] Fax: [phone]    Local or Mail Order:local     Ordering Provider:LEVON Pena    Would the patient rather a call back or a response via [facility]? Call     Best Call Back Number:.[phone]      Additional Information:

## 2024-07-25 DIAGNOSIS — J44.9 CHRONIC OBSTRUCTIVE PULMONARY DISEASE, UNSPECIFIED COPD TYPE: ICD-10-CM

## 2024-07-25 RX ORDER — FLUTICASONE PROPIONATE AND SALMETEROL 50; 250 UG/1; UG/1
1 POWDER RESPIRATORY (INHALATION) 2 TIMES DAILY
Qty: 60 EACH | Refills: 2 | Status: SHIPPED | OUTPATIENT
Start: 2024-07-25

## 2024-07-25 NOTE — TELEPHONE ENCOUNTER
No care due was identified.  [facility] Embedded Care Due Messages. Reference number: [id number].   7/25/2024 10:08:46 AM CDT

## 2024-07-28 DIAGNOSIS — J44.9 CHRONIC OBSTRUCTIVE PULMONARY DISEASE, UNSPECIFIED COPD TYPE: ICD-10-CM

## 2024-07-28 NOTE — TELEPHONE ENCOUNTER
No care due was identified.  [facility] Embedded Care Due Messages. Reference number: [id number].   7/28/2024 11:07:56 AM CDT

## 2024-07-29 RX ORDER — ALBUTEROL SULFATE 90 UG/1
2 AEROSOL, METERED RESPIRATORY (INHALATION) EVERY 6 HOURS PRN
Qty: 6.7 G | Refills: 2 | Status: SHIPPED | OUTPATIENT
Start: 2024-07-29

## 2024-08-01 ENCOUNTER — OFFICE VISIT (OUTPATIENT)
Dept: PODIATRY | Facility: CLINIC | Age: 54
End: 2024-08-01
Payer: MEDICAID

## 2024-08-01 VITALS — HEIGHT: 66 IN | WEIGHT: 231.5 LBS | BODY MASS INDEX: 37.21 KG/M2

## 2024-08-01 DIAGNOSIS — E11.49 TYPE II DIABETES MELLITUS WITH NEUROLOGICAL MANIFESTATIONS: Primary | ICD-10-CM

## 2024-08-01 DIAGNOSIS — B35.1 ONYCHOMYCOSIS DUE TO DERMATOPHYTE: ICD-10-CM

## 2024-08-01 PROCEDURE — 11720 DEBRIDE NAIL 1-5: CPT | Mod: PBBFAC,PO | Performed by: PODIATRIST

## 2024-08-01 PROCEDURE — 99999 PR PBB SHADOW E&M-EST. PATIENT-LVL IV: CPT | Mod: PBBFAC,,, | Performed by: PODIATRIST

## 2024-08-01 PROCEDURE — 99214 OFFICE O/P EST MOD 30 MIN: CPT | Mod: PBBFAC,PO,25 | Performed by: PODIATRIST

## 2024-08-01 NOTE — PROGRESS NOTES
Subjective:     Patient ID: Audrey Natarajan is a 54 y.o. female.    Chief Complaint: Foot Swelling (Right foot) and Diabetes Mellitus (5/10/24 Scooter Ramos)    Audrey is a 54 y.o. female who presents to the clinic for evaluation and treatment of high risk feet. Audrey has a past medical history of ADHD (attention deficit hyperactivity disorder), Arthritis, Asthma, Bipolar 1 disorder, Cataract, Cigarette smoker, COPD (chronic obstructive pulmonary disease), Coronary artery disease, Depression, Diabetes mellitus, Diabetic foot ulcers, Diabetic neuropathy, DVT of lower extremity, bilateral (07/2013), Encounter for blood transfusion, History of blood clots (1. Left Leg=2003; 2.Bilateral Groin=Blood Clots= 5 or 6/ 2013 & 7/2013; 3. LLL of Lung=7/2013;  4. Lt. Lower Leg=7/2013. ), HTN (hypertension) (06/06/2013), Hypercholesteremia, Irregular heartbeat, Neuromuscular disorder, Obese, PE (pulmonary embolism) (07/2013), and Restless leg syndrome. The patient's chief complaint is long, thick toenails. This patient has documented high risk feet requiring routine maintenance secondary to peripheral neuropathy.    PCP: Donaldo Pena MD    Date Last Seen by PCP: per above    Current shoe gear:  Affected Foot: Tennis shoes     Unaffected Foot: Tennis shoes    Hemoglobin A1C   Date Value Ref Range Status   04/22/2024 11.0 (H) 4.0 - 5.6 % Final     Comment:     ADA Screening Guidelines:  5.7-6.4%  Consistent with prediabetes  >or=6.5%  Consistent with diabetes    High levels of fetal hemoglobin interfere with the HbA1C  assay. Heterozygous hemoglobin variants (HbS, HgC, etc)do  not significantly interfere with this assay.   However, presence of multiple variants may affect accuracy.     01/19/2024 12.1 (H) 4.0 - 5.6 % Final     Comment:     ADA Screening Guidelines:  5.7-6.4%  Consistent with prediabetes  >or=6.5%  Consistent with diabetes    High levels of fetal hemoglobin interfere with the HbA1C  assay. Heterozygous  hemoglobin variants (HbS, HgC, etc)do  not significantly interfere with this assay.   However, presence of multiple variants may affect accuracy.     10/07/2023 11.6 (H) 4.0 - 5.6 % Final     Comment:     ADA Screening Guidelines:  5.7-6.4%  Consistent with prediabetes  >or=6.5%  Consistent with diabetes    High levels of fetal hemoglobin interfere with the HbA1C  assay. Heterozygous hemoglobin variants (HbS, HgC, etc)do  not significantly interfere with this assay.   However, presence of multiple variants may affect accuracy.         Review of Systems   Constitutional: Negative for chills.   Cardiovascular:  Negative for chest pain and claudication.   Respiratory:  Negative for cough.    Skin:  Positive for color change, dry skin and nail changes.   Musculoskeletal:  Positive for joint pain.   Gastrointestinal:  Negative for nausea.   Neurological:  Positive for paresthesias. Negative for numbness.   Psychiatric/Behavioral:  The patient is not nervous/anxious.         Objective:     Physical Exam  Constitutional:       Appearance: She is well-developed.      Comments: Oriented to time, place, and person.   Cardiovascular:      Comments: DP and PT pulses are palpable bilaterally. 3 sec capillary refill time and toes and feet are warm to touch proximally .  There is  hair growth on the feet and toes b/l. There is no edema b/l. No spider veins or varicosities present b/l.     Musculoskeletal:      Comments: Equinus noted b/l ankles with < 10 deg DF noted. MMT 5/5 in DF/PF/Inv/Ev resistance with no reproduction of pain in any direction. Passive range of motion of ankle and pedal joints is painless b/l.    H/o L BKA    Feet:      Right foot:      Skin integrity: No callus or dry skin.      Left foot:      Skin integrity: No callus or dry skin.   Lymphadenopathy:      Comments: Negative lymphadenopathy bilateral popliteal fossa and tarsal tunnel.   Skin:     Comments: No open lesions, lacerations or wounds  noted.Interdigital spaces clean, dry and intact b/l. No erythema noted to b/l foot.    Toenails 1-5 right are elongated by 2-3 mm, thickened by 2-3 mm, discolored/yellowed, dystrophic, brittle with subungual debris.       Neurological:      Mental Status: She is alert.      Comments: Light touch, proprioception, and sharp/dull sensation are all intact bilaterally. Protective threshold with the [place]ienstein monofilament is intact bilaterally.    Psychiatric:         Behavior: Behavior is cooperative.           Assessment:      Encounter Diagnoses   Name Primary?    Type II diabetes mellitus with neurological manifestations Yes    Onychomycosis due to dermatophyte      Plan:     Audrey was seen today for foot swelling and diabetes mellitus.    Diagnoses and all orders for this visit:    Type II diabetes mellitus with neurological manifestations    Onychomycosis due to dermatophyte      I counseled the patient on her conditions, their implications and medical management.      - Shoe inspection. Diabetic Foot Education. Patient reminded of the importance of good nutrition and blood sugar control to help prevent podiatric complications of diabetes. Patient instructed on proper foot hygeine. We discussed wearing proper shoe gear, daily foot inspections, never walking without protective shoe gear, never putting sharp instruments to feet, routine podiatric nail visits every 2-3 months.   - With patient's permission, nails were aggressively reduced and debrided x 5 to their soft tissue attachment mechanically and with electric , removing all offending nail and debris. Patient relates relief following the procedure. He will continue to monitor the areas daily, inspect his feet, wear protective shoe gear when ambulatory, moisturizer to maintain skin integrity and follow in this office in approximately 2-3 months, sooner p.r.n.

## 2024-08-06 DIAGNOSIS — Z79.4 TYPE 2 DIABETES MELLITUS WITH DIABETIC POLYNEUROPATHY, WITH LONG-TERM CURRENT USE OF INSULIN: ICD-10-CM

## 2024-08-06 DIAGNOSIS — I10 ESSENTIAL HYPERTENSION: ICD-10-CM

## 2024-08-06 DIAGNOSIS — E11.42 TYPE 2 DIABETES MELLITUS WITH DIABETIC POLYNEUROPATHY, WITH LONG-TERM CURRENT USE OF INSULIN: ICD-10-CM

## 2024-08-06 DIAGNOSIS — G54.6 PHANTOM LIMB PAIN: ICD-10-CM

## 2024-08-06 RX ORDER — LISINOPRIL 10 MG/1
10 TABLET ORAL NIGHTLY
Qty: 90 TABLET | Refills: 2 | Status: SHIPPED | OUTPATIENT
Start: 2024-08-06

## 2024-08-06 RX ORDER — TRAMADOL HYDROCHLORIDE 50 MG/1
50 TABLET ORAL EVERY 8 HOURS PRN
Qty: 30 TABLET | Refills: 0 | Status: SHIPPED | OUTPATIENT
Start: 2024-08-06

## 2024-08-25 NOTE — TELEPHONE ENCOUNTER
No care due was identified.  [facility] Embedded Care Due Messages. Reference number: [id number].   8/25/2024 5:55:32 AM CDT

## 2024-08-26 RX ORDER — MELOXICAM 7.5 MG/1
TABLET ORAL
Qty: 30 TABLET | Refills: 1 | Status: SHIPPED | OUTPATIENT
Start: 2024-08-26

## 2024-08-29 ENCOUNTER — PATIENT OUTREACH (OUTPATIENT)
Dept: ADMINISTRATIVE | Facility: HOSPITAL | Age: 54
End: 2024-08-29
Payer: MEDICAID

## 2024-08-29 ENCOUNTER — PATIENT MESSAGE (OUTPATIENT)
Dept: ADMINISTRATIVE | Facility: HOSPITAL | Age: 54
End: 2024-08-29
Payer: MEDICAID

## 2024-08-30 ENCOUNTER — OFFICE VISIT (OUTPATIENT)
Dept: FAMILY MEDICINE | Facility: CLINIC | Age: 54
End: 2024-08-30
Payer: MEDICAID

## 2024-08-30 VITALS
OXYGEN SATURATION: 96 % | HEART RATE: 78 BPM | DIASTOLIC BLOOD PRESSURE: 58 MMHG | SYSTOLIC BLOOD PRESSURE: 110 MMHG | BODY MASS INDEX: 37.36 KG/M2 | HEIGHT: 66 IN | TEMPERATURE: 98 F

## 2024-08-30 DIAGNOSIS — Z79.4 TYPE 2 DIABETES MELLITUS WITH DIABETIC POLYNEUROPATHY, WITH LONG-TERM CURRENT USE OF INSULIN: Primary | ICD-10-CM

## 2024-08-30 DIAGNOSIS — E66.01 MORBID OBESITY: ICD-10-CM

## 2024-08-30 DIAGNOSIS — Z89.512 S/P BKA (BELOW KNEE AMPUTATION) UNILATERAL, LEFT: ICD-10-CM

## 2024-08-30 DIAGNOSIS — E11.42 TYPE 2 DIABETES MELLITUS WITH DIABETIC POLYNEUROPATHY, WITH LONG-TERM CURRENT USE OF INSULIN: Primary | ICD-10-CM

## 2024-08-30 DIAGNOSIS — Z86.718 HISTORY OF DVT (DEEP VEIN THROMBOSIS): ICD-10-CM

## 2024-08-30 DIAGNOSIS — G54.6 PHANTOM LIMB PAIN: ICD-10-CM

## 2024-08-30 PROCEDURE — 99999 PR PBB SHADOW E&M-EST. PATIENT-LVL V: CPT | Mod: PBBFAC,,, | Performed by: INTERNAL MEDICINE

## 2024-08-30 PROCEDURE — 99215 OFFICE O/P EST HI 40 MIN: CPT | Mod: PBBFAC,PN | Performed by: INTERNAL MEDICINE

## 2024-08-30 RX ORDER — TRAMADOL HYDROCHLORIDE 50 MG/1
50 TABLET ORAL EVERY 8 HOURS PRN
Qty: 60 TABLET | Refills: 0 | Status: SHIPPED | OUTPATIENT
Start: 2024-08-30

## 2024-08-30 RX ORDER — INSULIN ASPART 100 [IU]/ML
5 INJECTION, SOLUTION INTRAVENOUS; SUBCUTANEOUS 3 TIMES DAILY
Qty: 9 ML | Refills: 1 | Status: SHIPPED | OUTPATIENT
Start: 2024-08-30

## 2024-08-30 NOTE — PROGRESS NOTES
"Subjective:       Patient ID: Audrye Natarajan is a 54 y.o. female.    Chief Complaint: Follow-up    F/u chronic conditions    HPI: 53 y/o w/ HTN PAD DM w/ neuropathy s/p left BKA presents alone for scheduled follow up. Is using left leg prothesis more consistently but is limited due to pain to lateral aspect of leg. No open wounds or drainage. Breathing at baseline using weekly SGLT2 inhibtor and twice daily basal insulin no hypoglycemic symptoms moving bowels daily she is using laba/ics for COPD feels breathing is at baseline no orthopnea or PND still with significant left leg phantom pains using gabapetin 300mg TID      Review of Systems   Constitutional:  Negative for activity change, appetite change, fatigue, fever and unexpected weight change.   HENT:  Negative for ear pain, rhinorrhea and sore throat.    Eyes:  Negative for discharge and visual disturbance.   Respiratory:  Negative for chest tightness, shortness of breath and wheezing.    Cardiovascular:  Negative for chest pain, palpitations and leg swelling.   Gastrointestinal:  Negative for abdominal pain, constipation and diarrhea.   Endocrine: Negative for cold intolerance and heat intolerance.   Genitourinary:  Negative for dysuria and hematuria.   Musculoskeletal:  Positive for arthralgias and back pain. Negative for joint swelling and neck stiffness.   Skin:  Negative for rash.   Neurological:  Negative for dizziness, syncope, weakness and headaches.   Psychiatric/Behavioral:  Negative for suicidal ideas.        Objective:     Vitals:    08/30/24 0936   BP: (!) 110/58   BP Location: Right arm   Patient Position: Sitting   BP Method: Large (Manual)   Pulse: 78   Temp: 98.1 °F (36.7 °C)   TempSrc: Oral   SpO2: 96%   Height: 5' 6" (1.676 m)          Physical Exam  Constitutional:       General: She is not in acute distress.     Appearance: She is obese.      Comments: Seated in wheelchair   Eyes:      General: No scleral icterus.  Cardiovascular:     "  Rate and Rhythm: Normal rate and regular rhythm.      Heart sounds: Normal heart sounds. No murmur heard.  Pulmonary:      Effort: Pulmonary effort is normal. No respiratory distress.      Breath sounds: Normal breath sounds. No wheezing.   Abdominal:      Palpations: Abdomen is soft.      Tenderness: There is no right CVA tenderness or left CVA tenderness.   Neurological:      General: No focal deficit present.      Mental Status: She is alert and oriented to person, place, and time.   Psychiatric:         Mood and Affect: Mood normal.         Behavior: Behavior normal.         Thought Content: Thought content normal.         Assessment and Plan   1. Type 2 diabetes mellitus with diabetic polyneuropathy, with long-term current use of insulin  Will get labs prior to pulmonary appointment next week no datat to make [facility]  - Lipid Panel; Future  - insulin detemir U-100, Levemir, 100 unit/mL (3 mL) SubQ InPn pen; Inject 15 Units into the skin 2 (two) times daily.  Dispense: 9 mL; Refill: 1  - insulin aspart U-100 (NOVOLOG) 100 unit/mL (3 mL) InPn pen; Inject 5 Units into the skin 3 (three) times daily.  Dispense: 9 mL; Refill: 1    2. S/P BKA (below knee amputation) unilateral, left  Tramadol prn to encourage more consistent use of prosthesis  - traMADoL (ULTRAM) 50 mg tablet; Take 1 tablet (50 mg total) by mouth every 8 (eight) hours as needed for Pain.  Dispense: 60 tablet; Refill: 0    3. History of DVT (deep vein thrombosis)  On apixiba    4. Morbid obesity  The patient is asked to make an attempt to improve diet and exercise patterns to aid in medical management of this problem.      5. Phantom limb pain  Gabapentin TID and prn tramadol refilled  - traMADoL (ULTRAM) 50 mg tablet; Take 1 tablet (50 mg total) by mouth every 8 (eight) hours as needed for Pain.  Dispense: 60 tablet; Refill: 0

## 2024-08-31 DIAGNOSIS — G54.6 PHANTOM LIMB PAIN: ICD-10-CM

## 2024-08-31 NOTE — TELEPHONE ENCOUNTER
No care due was identified.  [facility] Embedded Care Due Messages. Reference number: [id number].   8/31/2024 5:29:39 PM CDT

## 2024-09-03 RX ORDER — GABAPENTIN 300 MG/1
600 CAPSULE ORAL 3 TIMES DAILY
Qty: 180 CAPSULE | Refills: 2 | Status: SHIPPED | OUTPATIENT
Start: 2024-09-03

## 2024-09-03 NOTE — TELEPHONE ENCOUNTER
Refill Routing Note   Medication(s) are not appropriate for processing by [facility] for the following reason(s):        Outside of protocol    [facility] action(s):  Route               Appointments  past 12m or future 3m with PCP    Date Provider   Last Visit   8/30/2024 Donaldo Pena MD   Next Visit   12/10/2024 Donaldo Pena MD   ED visits in past 90 days: 0        Note composed:8:10 AM 09/03/2024

## 2024-09-09 ENCOUNTER — TELEPHONE (OUTPATIENT)
Dept: FAMILY MEDICINE | Facility: CLINIC | Age: 54
End: 2024-09-09
Payer: MEDICAID

## 2024-09-09 DIAGNOSIS — G54.6 PHANTOM LIMB PAIN: ICD-10-CM

## 2024-09-09 RX ORDER — GABAPENTIN 300 MG/1
600 CAPSULE ORAL 3 TIMES DAILY
Qty: 180 CAPSULE | Refills: 2 | Status: SHIPPED | OUTPATIENT
Start: 2024-09-09

## 2024-09-09 NOTE — TELEPHONE ENCOUNTER
No care due was identified.  [facility] Embedded Care Due Messages. Reference number: [id number].   9/09/2024 1:37:49 PM CDT

## 2024-09-13 DIAGNOSIS — J30.89 NON-SEASONAL ALLERGIC RHINITIS, UNSPECIFIED TRIGGER: ICD-10-CM

## 2024-09-13 NOTE — TELEPHONE ENCOUNTER
No care due was identified.  [facility] Embedded Care Due Messages. Reference number: [id number].   9/13/2024 1:39:22 PM CDT

## 2024-09-13 NOTE — TELEPHONE ENCOUNTER
----- Message from Peterson Tierney sent at 9/13/2024 12:19 PM CDT -----  Regarding: Self   Type: RX Refill Request    Who Called: Self     Have you contacted your pharmacy: yes     Refill    RX Name and Strength:loratadine (CLARITIN) 10 mg tablet     Preferred Pharmacy with phone number:.  [facility] #[id number] [address] AT [address] [address]  Phone: [phone] Fax: [phone]          Local or Mail Order: local     Would the patient rather a call back or a response via [facility]? Call back     Best Call Back Number:.[phone]      Additional Information:     Thank you.

## 2024-09-14 RX ORDER — LORATADINE 10 MG/1
10 TABLET ORAL DAILY
Qty: 90 TABLET | Refills: 3 | Status: SHIPPED | OUTPATIENT
Start: 2024-09-14

## 2024-09-14 NOTE — TELEPHONE ENCOUNTER
Refill Decision Note   Audrey Natarajan  is requesting a refill authorization.  Brief Assessment and Rationale for Refill:  Approve     Medication Therapy Plan:        Comments:     Note composed:9:37 AM 09/14/2024

## 2024-09-19 DIAGNOSIS — Z89.512 S/P BKA (BELOW KNEE AMPUTATION) UNILATERAL, LEFT: ICD-10-CM

## 2024-09-19 NOTE — TELEPHONE ENCOUNTER
No care due was identified.  [facility] Embedded Care Due Messages. Reference number: [id number].   9/19/2024 6:38:16 PM CDT

## 2024-09-20 RX ORDER — METHOCARBAMOL 500 MG/1
TABLET, FILM COATED ORAL
Qty: 45 TABLET | Refills: 1 | Status: SHIPPED | OUTPATIENT
Start: 2024-09-20

## 2024-09-20 NOTE — TELEPHONE ENCOUNTER
Last Office Visit Info:   The patient's last visit with Donaldo Pena MD was on 8/30/2024.    The patient's last visit in current department was on 8/30/2024.    Upcoming appointment: 12/10/2024

## 2024-09-22 NOTE — PROGRESS NOTES
Chief Complaint   Patient presents with    Eye Swelling       HPI: 79 year old female seen here earlier for evaluation after a fall.  CT head and facial bones showed no acute abnormalities.  Patient was resting just before arrival when the hematoma above her right eyebrow became significantly bigger and more tender.  Her globe does not hurt.  Her eye has been swollen shut with hematoma since the fall.  She has no other complaints currently    Past Medical History:   Diagnosis Date    Acute on chronic diastolic congestive heart failure  (CMD) 01/28/2022    Acute renal failure (CMD) 09/03/2019    Breast cancer  (CMD)     Calcium ureterolithiasis     CKD (chronic kidney disease)     Coronary artery disease involving native coronary artery of native heart without angina pectoris 08/15/2018    Cardiac cath (8/8/18) for inf STEMI: 90% prox and mid OM1 (2x15, 2x23 BMS),  mid RCA, 70% RI, 50% prox LAD. Echo (8/8/18): LVEF 50%, inferolateral HK. BMS used b/o pending urologic procedure. Cardiac cath (5/20/19): 80% mid LAD (2.75x38 ZOILA), distal LAD (2.5x18 ZOILA), prox RI (2.5x18 ZOILA), patent Cx stents,  mid RCA. Cardiac cath (8/30/19): Patent LAD, RI, OM stents,  mid RCA.  No interven    Diverticulitis     Elevated d-dimer 01/28/2022    Esophageal reflux     Essential hypertension 05/25/2017    Gout     History of heart artery stent     5 stents    History of heart attack     History of transient cerebral ischemia     Kidney stone     Left nephrolithiasis     Leukocytosis     Malignant neoplasm  (CMD)     Monoclonal gammopathy     Myelofibrosis  (CMD)     Myocardial infarction  (CMD)     OAB (overactive bladder)     Pure hypercholesterolemia 05/25/2017    TIA (transient ischemic attack)     Truncal obesity        Patient Active Problem List   Diagnosis    Coronary artery disease involving native coronary artery of native heart without angina pectoris    Primary hypertension    Mixed hyperlipidemia    Calcium  PSYCHIATRY INPATIENT PROGRESS NOTE  SUBSEQUENT HOSPITAL VISIT      4/19/2022 12:00 PM   Audrey Natarajan   1970   [id number]           DATE OF ADMISSION: 4/12/2022 11:28 PM    SITE: [facility]    CURRENT LEGAL STATUS: PEC/CEC    HISTORY    Per Initial History from Primary Team:   Audrey Natarajan is a 52 yo female with a pmh of DM2, bipolar 1 disorder, cellulitis, COPD, HTN, CAD, DVT/PE. She presented to the ED with c/o fevers x 1 day. She also has BLE swelling and pain and wounds to both feet, worsening x 2 weeks. She had fever up to 102F yesterday. She reports the wounds to her right foot are from dragging her feet while she was angry. Denies drainage to foot wounds. She has had blood clots in the past and has an IVC filter placed in 2012. She was sent here from [facility] where she was under CEC for psychosis. ED workup revealed BLE DVT on venous US, WBC 31, and Hgb 9.9. She received a dose of vancomycin while in the ED.   Interval History from Primary Team on 04/18/2022:  patient doing ok today, having some agitation   - currently patient is CECed and needs further inpatient therapy  - patient's WBC is rising today; cultures from foot great MRSA and a anerobic bacteria; abx switched to bactrim and flagyl started  - will have podiatry re-evaluate foot in case it needs a wash out, will make npo after midnight; will also have ID weigh in on abx regimen   - patient also has a noted large lower abdominal hematoma, likely due to lovenox injections; getting CT ab pelv to further evaluate extent of it;   - will transfer to inpatient psych once medically stable;        Chief Complaint / Reason for Original Psychiatry Consult: Psychosis / Bipolar I Disorder; Patient from [facility] on PEC/CEC       Subjective / Interval Psychiatric History Today (04/19/2022) (with psychiatric ROS below):   Audrey Natarajan is a 51 y.o. female with a past medical history as noted above/below, and a past  psychiatric history of Bipolar I Disorder, psychosis, depression, and ADHD, currently being treated by her inpatient primary team for a principle problem of acute DVTs of BLEs and wound infection.  Psychiatry was originally consulted as noted above.  The patient was seen and examined again this AM.  The chart was reviewed again this AM.  On examination today, the patient was alert and oriented to person, place, city, state, month, year, and situation.  She was CAM-ICU negative for delirium.  Her psychosis / bibi appears worse today when compared to yesterday's assessment.  She continues to appear with paranoia, racing thoughts, loosening of associations, pressured/rapid speech, and tangential / disorganized TP.  She endorses intermittent trouble with sleep (intermittent awakening) overnight (unable to quantify despite multiple attempts).  She endorses a good appetite.  She denies AH, VH, or TH (no RIS observed).  She denies any current/recent passive/active SI/HI.  She denies any adverse effects to her current medication regimen.  Regarding current medical/physical complaints, she endorses improving diffuse MSK pain.  She denies any other medical complaints at this time.  NAD was observed during the examination.  Psychotherapy was again implemented with a focus on improving mood / bibi / thought process and sleep.  See detailed psych ROS below.  See A/P below.          Psychiatric Review Of Systems - Currently, the patient is endorsing and/or denying the following:  (patient's endorsements are BOLDED below; if not BOLDED, then patient denied):     Endorses Symptoms of Depression: diminished mood, low motivation, loss of interest/anhedonia, irritability, diminished energy, change in sleep, change in appetite, diminished concentration or cognition or indecisiveness, PMA/R, excessive guilt or hopelessness or worthlessness, suicidal ideations     Endorses issues with Sleep: initiation, maintenance, early morning  ureterolithiasis    Cholelithiasis    Depression    GERD (gastroesophageal reflux disease)    Left nephrolithiasis    Primary osteoarthritis involving multiple joints    Serum calcium elevated    Truncal obesity    Hiatal hernia    Hx of transient ischemic attack (TIA)    History of breast cancer in female    Age-related cataract    CKD (chronic kidney disease) stage 3, GFR 30-59 ml/min  (CMD)    Anemia    History of gout    Hypertensive heart disease with acute diastolic congestive heart failure  (CMD)    Varicose veins of both lower extremities with pain    Age-related osteoporosis without current pathological fracture    Fibroepithelial polyp    Myelofibrosis  (CMD)    Splenomegaly    Acute idiopathic gout of right wrist    Loose stools    Hypoxemia    Rash        Past Surgical History:   Procedure Laterality Date    APPENDECTOMY      BLADDER SURGERY      BREAST BIOPSY      BREAST LUMPECTOMY      BREAST SURGERY      CARDIAC CATHERIZATION  2019     DELIVERY ONLY       SECTION, CLASSIC      COLONOSCOPY      CORONARY STENT PLACEMENT  09/07/2018    x2    D AND C      EYE SURGERY Bilateral     cataract    HEMORRHOID SURGERY      KIDNEY STONE SURGERY      STENT IMPLANT      5 stents     TONSILLECTOMY         Family History   Problem Relation Age of Onset    Natural Causes Mother     Leukemia Father 69    Cancer, Breast Sister     Heart disease Brother     Cancer, Breast Maternal Aunt     Cancer, Breast Maternal Aunt     Cancer, Liver Maternal Aunt        Social History     Tobacco Use    Smoking status: Never     Passive exposure: Never    Smokeless tobacco: Never   Vaping Use    Vaping status: never used   Substance Use Topics    Alcohol use: Yes     Alcohol/week: 2.0 standard drinks of alcohol     Types: 2 Standard drinks or equivalent per week     Comment: occ    Drug use: Never       Allergies   Allergen Reactions    Lisinopril ANAPHYLAXIS    Lactose   (Food Or Med) GI UPSET     Diarrhea         Review of Systems: See above - all other systems reviewed & are negative    Physical Exam     ED Triage Vitals [09/21/24 2100]   ED Triage Vitals Group      Temp 96.8 °F (36 °C)      Heart Rate (!) 107      Resp 20      BP (!) 187/88      SpO2 91 %      EtCO2 mmHg       Height       Weight       Weight Scale Used       BMI (Calculated)       IBW/kg (Calculated)         Resting comfortably scalp atraumatic, large right frontal hematoma without active bleeding right eye is ecchymotic and closed I am able to open it without difficulty her globe is unremarkable pupils are equal and reactive extraocular muscles are intact face is otherwise atraumatic and stable neck and spine unremarkable heart regular lungs clear abdomen soft and nontender skin extremities warm and well-perfused              Initial assessment, differential & plan     Expanding forehead hematoma.  No external bleeding.  No globe involvement.  CT facial bones will be repeated and an ice pack with a pressure dressing will be placed over the hematoma    Ongoing shared medical decision making can be viewed in the ED Course    ED Meds     Medications   cefadroxil (DURICEF) capsule 500 mg (has no administration in time range)      Radiology     CT FACIAL BONES WO CONTRAST   Final Result      No acute intracranial abnormality.      Progressive right frontal and periorbital scalp hematoma with associated   subcutaneous emphysema, correlate for penetrating trauma.       No definite displaced maxillofacial fracture or malalignment.            Electronically Signed by: SHABANA GARCIA M.D.    Signed on: 9/21/2024 11:11 PM    Workstation ID: [id number]      CT HEAD WO CONTRAST   Final Result      No acute intracranial abnormality.      Progressive right frontal and periorbital scalp hematoma with associated   subcutaneous emphysema, correlate for penetrating trauma.       No definite displaced maxillofacial fracture or malalignment.           awakening with inability to return to sleep     Denies Suicidal/Homicidal ideations: active/passive ideations, organized plans, future intentions     Endorses Symptoms of psychosis: paranoia, hallucinations, delusions, disorganized thinking, disorganized behavior or abnormal motor behavior, or negative symptoms (diminshed emotional expression, avolition, anhedonia, alogia, asociality)      Endorses Symptoms of bibi or hypomania: elevated, expansive, or irritable mood with increased energy or activity; with inflated self-esteem or grandiosity, decreased need for sleep, increased rate of speech, FOI or racing thoughts, distractibility, increased goal directed activity or PMA, risky/disinhibited behavior     Denies Symptoms of MALISSA: excessive anxiety/worry/fear, more days than not, about numerous issues, difficult to control, with restlessness, fatigue, poor concentration, irritability, muscle tension, sleep disturbance; causes functionally impairing distress      Denies Symptoms of Panic Disorder: recurrent panic attacks, precipitated or un-precipitated, source of worry and/or behavioral changes secondary; with or without agoraphobia     Denies Symptoms of PTSD: h/o trauma; re-experiencing/intrusive symptoms, avoidant behavior, negative alterations in cognition or mood, or hyperarousal symptoms; with or without dissociative symptoms      Denies Symptoms of OCD: obsessions or compulsions      Denies Symptoms of Eating Disorders: anorexia, bulimia or binging     Denies Substance Use: intoxication, withdrawal, tolerance, used in larger amounts or duration than intended, unsuccessful attempts to limit or quit, increased time engaging in or seeking out, cravings or strong desire to use, failure to fulfill obligations, negative consequences in social/interpersonal/occupational,/recreational areas, use in dangerous situations, medical or psychological consequences         PSYCHOTHERAPY ADD-ON +[id number]   30 (16-37*)    Electronically Signed by: SHABANA GARCIA M.D.    Signed on: 9/21/2024 11:11 PM    Workstation ID: [id number]         I have personally reviewed radiology images and formal radiology interpretations when available.    See ED Course for approximate timing of my interpretations & additional comments if applicable.    All results documented by me in the ED course were personally reviewed by me.  Labs   I have personally reviewed lab results resulted during ED visit when available  See ED Course for approximate timing of pertinent lab results personally reviewed by me if applicable  Procedures   Procedures  As is customary, all procedures by EM medical students & residents performed under my direct supervision  ED Course     ED Course as of 09/21/24 2356   Sat Sep 21, 2024   2353 CT findings noted.  The subcutaneous emphysema is to be expected as she had a small laceration that was dermabonded earlier today.  I am going to place her on a short course of oral antibiotics to prevent infection.  Of note the hematoma has decreased significantly in size with the ice pack and Ace bandage.  She will continue both of these modalities at home and assured me as well as her son that she would return with a change in condition. [RG]      ED Course User Index  [RG] Carlos A Leyva MD        1155pm after ice/compression wrap      MDM Summary / Critical Care   Medical Decision Making  See initial assessment, differential & plan as well as ED Course    Amount and/or Complexity of Data Reviewed  - Independent Historian: See initial assessment, differential & plan as well as ED Course  - Ext Data Reviewed: See initial assessment, differential & plan as well as ED Course  - Labs: Details: See initial assessment, differential & plan as well as ED Course  - Radiology: Details: See initial assessment, differential & plan as well as ED Course  - ECG/med tests: Details: See initial assessment, differential, plan & ED Course  - Discussion  minutes     Time: 19 minutes  Participants: Met with patient     Therapeutic Intervention Type: behavior modifying psychotherapy, supportive psychotherapy  Why chosen therapy is appropriate versus another modality: relevant to diagnosis, patient responds to this modality, evidence based practice     Target symptoms: mood / bibi / TERRI and insomnia   Primary focus: improving mood / bibi / thought process and sleep  Psychotherapeutic techniques: supportive and psychodynamic techniques; psycho-education; deep breathing exercises; CBT; problem solving techniques and managing life stressors     Outcome monitoring methods: self-report, observation     Patient's response to intervention:  The patient's response to intervention is accepting / limited      Progress toward goals:  The patient's progress toward goals is fair / limited         ROS  General ROS: negative for - chills, fatigue, fever or night sweats  Ophthalmic ROS: negative for - blurry vision, double vision or eye pain  ENT ROS: negative for - sinus pain, headaches, sore throat or visual changes  Allergy and Immunology ROS: negative for - hives, itchy/watery eyes or nasal congestion  Hematological and Lymphatic ROS: negative for - bleeding problems, bruising, jaundice or pallor  Endocrine ROS: negative for - galactorrhea, hot flashes, mood swings, palpitations or temperature intolerance  Respiratory ROS: negative for - cough, hemoptysis, shortness of breath, tachypnea or wheezing  Cardiovascular ROS: negative for - chest pain, dyspnea on exertion, loss of consciousness, palpitations, rapid heart rate or shortness of breath  Gastrointestinal ROS: negative for - appetite loss, nausea, abdominal pain, blood in stools, change in bowel habits, constipation or diarrhea  Genito-Urinary ROS: negative for - incontinence, nocturia or pelvic pain  Musculoskeletal ROS: negative for - joint stiffness; positive for diffuse MSK pain / discomfort (improving)  Neurological  "ROS: negative for - behavioral changes, confusion, dizziness, memory loss, numbness/tingling or seizures  Dermatological ROS: negative for dry skin, hair changes, pruritus or rash; positive for color change / wound (improving)  Psychiatric ROS: see detailed psychiatric ROS above in subjective section       PAST MEDICAL & SURGICAL HISTORY   Past Medical History:   Diagnosis Date    ADHD (attention deficit hyperactivity disorder)     Arthritis     Asthma     Bipolar 1 disorder     Cataract     COPD (chronic obstructive pulmonary disease)     Coronary artery disease     A fib    Depression     bipolar manic depresson    Diabetes mellitus     DVT of lower extremity, bilateral July 2013    bilateral LE DVT. Estelita filter placed.     Encounter for blood transfusion     History of blood clots 1. Left Leg=2003; 2.Bilateral Groin=Blood Clots= 5 or 6/ 2013 & 7/2013; 3. LLL of Lung=7/2013;  4. Lt. Lower Leg=7/2013.     Pt. had 1st Blood Clot after Kvwchllhpnbe=5454, & Last=2013. Estelita Filter= Rt.Lateral Neck.    HTN (hypertension) 6/6/2013    Pt states that she does not have hypertension    Hypercholesteremia     Irregular heartbeat     Neuromuscular disorder     neuropathy feet    PE (pulmonary embolism) July 2013     bilat LE DVT.     Restless leg syndrome      Past Surgical History:   Procedure Laterality Date    ABDOMINAL SURGERY  2010    gastric sleeve    BILATERAL OOPHORECTOMY Bilateral 1/12/2015    CHOLECYSTECTOMY      Green' s filter Right 7/4/2012    Right Neck & Tunneled Down.    HERNIA REPAIR      "[place] [facility] Hernias [employer]aires around th [place].", pt. states    LAPAROSCOPIC CHOLECYSTECTOMY N/A 9/10/2020    Procedure: CHOLECYSTECTOMY, LAPAROSCOPIC;  Surgeon: Montrell Gutierrez MD;  Location: [facility];  Service: General;  Laterality: N/A;  RN PREOP 9/9----COVID Negative  9/9    OVARIAN CYST REMOVAL  3/13/2014    PA REMOVAL OF OVARY/TUBE(S)      SPLENECTOMY, TOTAL  July 2003    " of management or test interpretation with external provider: See initial         assessment, differential, plan & ED Course    Risk  Risk Details: See initial assessment, differential & plan as well as ED Course    Critical Care    Diagnosis        ED Diagnosis   1. Facial hematoma, initial encounter               Carlos A Leyva MD  09/21/24 3803     TONSILLECTOMY      as a child    TYMPANOSTOMY TUBE PLACEMENT      VEIN SURGERY      Lt leg       FAMILY HISTORY   Family History   Problem Relation Age of Onset    Hypertension Father     Diabetes Father     Heart disease Father     Cataracts Father     Diabetes Paternal Grandfather     Heart disease Paternal Grandfather     No Known Problems Mother     Ovarian cancer Maternal Grandmother          from this. ? age     No Known Problems Sister     No Known Problems Brother     No Known Problems Maternal Aunt     No Known Problems Maternal Uncle     No Known Problems Paternal Aunt     No Known Problems Paternal Uncle     No Known Problems Maternal Grandfather     Ovarian cancer Paternal Grandmother     Uterine cancer Neg Hx     Breast cancer Neg Hx     Colon cancer Neg Hx     Amblyopia Neg Hx     Blindness Neg Hx     Cancer Neg Hx     Glaucoma Neg Hx     Macular degeneration Neg Hx     Retinal detachment Neg Hx     Strabismus Neg Hx     Stroke Neg Hx     Thyroid disease Neg Hx        ALLERGIES   Review of patient's allergies indicates:   Allergen Reactions    Morphine Other (See Comments)     Patient had a psychotic episode after taking Morphine  Agitation, hallucinations    Penicillins Anaphylaxis     itching    Januvia [sitagliptin] Hives       CURRENT MEDICATION REGIMEN   Home Meds:   Prior to Admission medications    Medication Sig Start Date End Date Taking? Authorizing Provider   aspirin 81 MG Chew Take 1 tablet (81 mg total) by mouth once daily. 21 Yes Ifeoma Jacobs MD   DUPIXENT  mg/2 mL PnIj SMARTSI Milligram(s) SUB-Q Every 2 Weeks 22  Yes Historical Provider   famotidine (PEPCID) 20 MG tablet Take 20 mg by mouth 2 (two) times daily.   Yes Historical Provider   fluticasone-salmeterol diskus inhaler 250-50 mcg Inhale 1 puff into the lungs 2 (two) times daily. Controller 21 Yes Donaldo Pena MD   furosemide (LASIX) 20  MG tablet TAKE 1 TABLET(20 MG) BY MOUTH EVERY DAY 2/8/22  Yes Donaldo Pena MD   gabapentin (NEURONTIN) 300 MG capsule TAKE 2 CAPSULES(600 MG) BY MOUTH TWICE DAILY 4/1/22  Yes Donaldo Pena MD   hydrOXYzine (ATARAX) 50 MG tablet Take 50 mg by mouth 4 (four) times daily as needed. 3/17/22  Yes Historical Provider   ibuprofen (ADVIL,MOTRIN) 600 MG tablet TAKE 1 TABLET(600 MG) BY MOUTH EVERY 8 HOURS AS NEEDED FOR PAIN OR BACK PAIN 4/11/22  Yes Donaldo Pena MD   lisinopriL 10 MG tablet Take 1 tablet (10 mg total) by mouth once daily. 4/4/22  Yes Donlado Pena MD   loratadine (CLARITIN) 10 mg tablet Take 1 tablet (10 mg total) by mouth once daily. 12/30/21 12/30/22 Yes Lary Sweet DO   metFORMIN (GLUCOPHAGE) 1000 MG tablet TAKE 1 TABLET(1000 MG) BY MOUTH TWICE DAILY WITH MEALS 12/26/21  Yes Donaldo Pena MD   metoprolol tartrate (LOPRESSOR) 50 MG tablet TAKE 1 TABLET(50 MG) BY MOUTH TWICE DAILY 12/26/21  Yes Donaldo Pena MD   pravastatin (PRAVACHOL) 40 MG tablet TAKE 1 TABLET(40 MG) BY MOUTH EVERY EVENING 12/26/21  Yes Donaldo Pnea MD   acetaminophen (TYLENOL) 500 MG tablet Take 2 tablets (1,000 mg total) by mouth every 6 (six) hours as needed for Pain. 7/13/21   Lary Sweet DO   albuterol (PROVENTIL/VENTOLIN HFA) 90 mcg/actuation inhaler Inhale 2 puffs into the lungs every 6 (six) hours as needed for Wheezing. Use with spacer  Dispense with 1 spacer 12/30/21 12/30/22  Lary Sweet DO   albuterol-ipratropium (DUO-NEB) 2.5 mg-0.5 mg/3 mL nebulizer solution Take 3 mLs by nebulization every 6 (six) hours as needed for Wheezing or Shortness of Breath. Rescue 7/19/21 7/19/22  Donaldo Pena MD   aluminum-magnesium hydroxide-simethicone (MAALOX) 200-200-20 mg/5 mL Susp Take 30 mLs by mouth every 6 (six) hours as needed (indigestion). 2/20/21 2/20/22  Ifeoma Jacobs MD   blood sugar diagnostic Strp To check BG two  times daily, to use with insurance preferred meter 9/30/21   oDnaldo Pena,  MD   blood-glucose meter kit To check BG once daily, to use with insurance preferred meter 2/20/21 12/8/23  Ifeoma Jacobs MD   blood-glucose meter kit To check BG two times daily, to use with insurance preferred meter 9/30/21 9/30/22  Donaldo Pena MD   dicyclomine (BENTYL) 20 mg tablet Take 1 tablet (20 mg total) by mouth every 6 (six) hours. 3/12/22   Tu Arredondo PA-C   divalproex (DEPAKOTE) 500 MG TbEC Take 1 tablet (500 mg total) by mouth once daily. 4/19/22 4/19/23  Diego Ridley MD   divalproex (DEPAKOTE) 500 MG TbEC Take 2 tablets (1,000 mg total) by mouth every evening. 4/18/22 4/18/23  Diego Ridley MD   enoxaparin (LOVENOX) 100 mg/mL Syrg Inject 1 mL (100 mg total) into the skin every 12 (twelve) hours. 4/18/22   Diego Ridley MD   EPITOL 200 mg tablet  2/26/21   Historical Provider   fluticasone propionate (FLONASE) 50 mcg/actuation nasal spray 1 spray (50 mcg total) by Each Nostril route 2 (two) times daily. 12/30/21   Lary Sweet DO   folic acid (FOLVITE) 1 MG tablet Take 1 tablet (1 mg total) by mouth once daily. 7/19/21 10/17/21  Donaldo Pena MD   hydrOXYzine pamoate (VISTARIL) 50 MG Cap Take 1 capsule (50 mg total) by mouth every 8 (eight) hours as needed (insomnia). 4/18/22   Diego Ridley MD   lancets Misc To check BG two times daily, to use with insurance preferred meter 9/30/21   Donaldo Pena MD   multivitamin Tab Take 1 tablet by mouth once daily. 2/20/21   Ifeoma Jacobs MD   [facility] Inhale into the lungs. 12/30/21   Historical Provider   pantoprazole (PROTONIX) 40 MG tablet Take 1 tablet (40 mg total) by mouth once daily. 7/13/21   Aiden Oleary MD   polyvinyl alcohol, artificial tears, (LIQUIFILM TEARS) 1.4 % ophthalmic solution Place 1 drop into both eyes 4 (four) times daily. 2/20/21   Ifeoma Jacobs MD   risperiDONE (RISPERDAL M-TABS) 2 MG disintegrating tablet Take 1 tablet (2 mg total) by mouth 2 (two) times daily. 4/18/22 4/18/23   Diego Ridley MD   senna (SENOKOT) 8.6 mg tablet Take 1 tablet by mouth 2 (two) times a day. 2/20/21   Ifeoma Jacobs MD   sulfamethoxazole-trimethoprim 800-160mg (BACTRIM DS) 800-160 mg Tab Take 2 tablets by mouth 2 (two) times daily. for 10 days 4/18/22 4/28/22  Diego Ridley MD   TRUE METRIX GLUCOSE METER [facility] [place] BLOOD SUGAR TWICE DAILY 11/4/21   Donaldo Pena MD   diclofenac sodium (VOLTAREN) 1 % Gel Apply 2 g topically 4 (four) times daily as needed (Apply to painful area up to 4 times a day as needed for pain). Apply to painful area 4 times a day as needed for pain 10/1/21 4/18/22  Corie Iqbal NP   nystatin (NYSTOP) powder APPLY TOPICALLY TO AXILLA AND BREAST FOLDS TWICE DAILY 9/30/21 4/18/22  Donaldo Pena MD   QUEtiapine (SEROQUEL) 200 MG Tab Take 1 tablet (200 mg total) by mouth before breakfast. 2/21/21 4/18/22  Ifeoma Jacobs MD       Scheduled Meds:    apixaban  10 mg Oral BID    aspirin  81 mg Oral Daily    divalproex  1,000 mg Oral QHS    divalproex  500 mg Oral Daily    famotidine  20 mg Oral BID    ferrous sulfate  1 tablet Oral Daily    fluticasone furoate-vilanteroL  1 puff Inhalation Daily    furosemide  20 mg Oral Daily    gabapentin  300 mg Oral BID    lisinopriL  10 mg Oral Daily    metroNIDAZOLE  500 mg Oral Q8H    miconazole NITRATE 2 %   Topical (Top) BID    nicotine  1 patch Transdermal Daily    pravastatin  40 mg Oral QHS    risperiDONE  2 mg Oral BID    sulfamethoxazole-trimethoprim 800-160mg  2 tablet Oral BID      PRN Meds: acetaminophen, albuterol-ipratropium, dextrose 10%, dextrose 10%, glucagon (human recombinant), glucose, glucose, HYDROcodone-acetaminophen, hydrocortisone, hydrOXYzine pamoate, insulin aspart U-100, melatonin, naloxone, ondansetron, senna-docusate 8.6-50 mg, sodium chloride 0.9%   Psychotherapeutics (From admission, onward)            Start     Stop Route Frequency Ordered    04/13/22 2100  risperiDONE disintegrating tablet 2 mg          -- Oral 2 times daily 04/13/22 1338          LABORATORY DATA   Recent Results (from the past 72 hour(s))   POCT glucose    Collection Time: 04/16/22  3:41 PM   Result Value Ref Range    POCT Glucose 214 (H) 70 - 110 mg/dL   POCT glucose    Collection Time: 04/16/22  7:43 PM   Result Value Ref Range    POCT Glucose 246 (H) 70 - 110 mg/dL   POCT glucose    Collection Time: 04/17/22  6:32 AM   Result Value Ref Range    POCT Glucose 247 (H) 70 - 110 mg/dL   CBC Auto Differential    Collection Time: 04/17/22 10:50 AM   Result Value Ref Range    WBC 14.34 (H) 3.90 - 12.70 K/uL    RBC 3.75 (L) 4.00 - 5.40 M/uL    Hemoglobin 10.3 (L) 12.0 - 16.0 g/dL    Hematocrit 32.1 (L) 37.0 - 48.5 %    MCV 86 82 - 98 fL    MCH 27.5 27.0 - 31.0 pg    MCHC 32.1 32.0 - 36.0 g/dL    RDW 15.3 (H) 11.5 - 14.5 %    Platelets 621 (H) 150 - 450 K/uL    MPV 9.9 9.2 - 12.9 fL    Immature Granulocytes 3.7 (H) 0.0 - 0.5 %    Gran # (ANC) 6.8 1.8 - 7.7 K/uL    Immature Grans (Abs) 0.53 (H) 0.00 - 0.04 K/uL    Lymph # 4.8 1.0 - 4.8 K/uL    Mono # 1.5 (H) 0.3 - 1.0 K/uL    Eos # 0.6 (H) 0.0 - 0.5 K/uL    Baso # 0.11 0.00 - 0.20 K/uL    nRBC 0 0 /100 WBC    Gran % 47.6 38.0 - 73.0 %    Lymph % 33.3 18.0 - 48.0 %    Mono % 10.1 4.0 - 15.0 %    Eosinophil % 4.5 0.0 - 8.0 %    Basophil % 0.8 0.0 - 1.9 %    Differential Method Automated    Basic metabolic panel    Collection Time: 04/17/22 10:50 AM   Result Value Ref Range    Sodium 134 (L) 136 - 145 mmol/L    Potassium 4.9 3.5 - 5.1 mmol/L    Chloride 96 95 - 110 mmol/L    CO2 24 23 - 29 mmol/L    Glucose 215 (H) 70 - 110 mg/dL    BUN 13 6 - 20 mg/dL    Creatinine 0.7 0.5 - 1.4 mg/dL    Calcium 10.1 8.7 - 10.5 mg/dL    Anion Gap 14 8 - 16 mmol/L    eGFR if African American >60 >60 mL/min/1.73 m^2    eGFR if non African American >60 >60 mL/min/1.73 m^2   Magnesium    Collection Time: 04/17/22 10:50 AM   Result Value Ref Range    Magnesium 1.5 (L) 1.6 - 2.6 mg/dL   Phosphorus    Collection Time: 04/17/22  10:50 AM   Result Value Ref Range    Phosphorus 4.3 2.7 - 4.5 mg/dL   POCT glucose    Collection Time: 04/17/22 12:10 PM   Result Value Ref Range    POCT Glucose 229 (H) 70 - 110 mg/dL   POCT glucose    Collection Time: 04/17/22  5:06 PM   Result Value Ref Range    POCT Glucose 165 (H) 70 - 110 mg/dL   POCT glucose    Collection Time: 04/17/22  7:04 PM   Result Value Ref Range    POCT Glucose 213 (H) 70 - 110 mg/dL   POCT glucose    Collection Time: 04/18/22  4:10 AM   Result Value Ref Range    POCT Glucose 234 (H) 70 - 110 mg/dL   POCT glucose    Collection Time: 04/18/22 11:13 AM   Result Value Ref Range    POCT Glucose 245 (H) 70 - 110 mg/dL   CBC Auto Differential    Collection Time: 04/18/22 12:46 PM   Result Value Ref Range    WBC 16.31 (H) 3.90 - 12.70 K/uL    RBC 3.82 (L) 4.00 - 5.40 M/uL    Hemoglobin 10.5 (L) 12.0 - 16.0 g/dL    Hematocrit 31.9 (L) 37.0 - 48.5 %    MCV 84 82 - 98 fL    MCH 27.5 27.0 - 31.0 pg    MCHC 32.9 32.0 - 36.0 g/dL    RDW 15.4 (H) 11.5 - 14.5 %    Platelets 683 (H) 150 - 450 K/uL    MPV 10.0 9.2 - 12.9 fL    Immature Granulocytes 4.5 (H) 0.0 - 0.5 %    Gran # (ANC) 8.7 (H) 1.8 - 7.7 K/uL    Immature Grans (Abs) 0.73 (H) 0.00 - 0.04 K/uL    Lymph # 4.9 (H) 1.0 - 4.8 K/uL    Mono # 1.4 (H) 0.3 - 1.0 K/uL    Eos # 0.5 0.0 - 0.5 K/uL    Baso # 0.12 0.00 - 0.20 K/uL    nRBC 0 0 /100 WBC    Gran % 53.3 38.0 - 73.0 %    Lymph % 29.8 18.0 - 48.0 %    Mono % 8.4 4.0 - 15.0 %    Eosinophil % 3.3 0.0 - 8.0 %    Basophil % 0.7 0.0 - 1.9 %    Aniso Slight     Poik Slight     Hypo Occasional     Ovalocytes Occasional     Target Cells Occasional     Comfort Cells Occasional     Differential Method Automated    Sedimentation rate    Collection Time: 04/18/22 12:46 PM   Result Value Ref Range    Sed Rate 96 (H) 0 - 20 mm/Hr   C-reactive protein    Collection Time: 04/18/22 12:46 PM   Result Value Ref Range    CRP 56.3 (H) 0.0 - 8.2 mg/L   Basic Metabolic Panel    Collection Time: 04/18/22 12:46 PM    Result Value Ref Range    Sodium 138 136 - 145 mmol/L    Potassium 4.4 3.5 - 5.1 mmol/L    Chloride 97 95 - 110 mmol/L    CO2 27 23 - 29 mmol/L    Glucose 237 (H) 70 - 110 mg/dL    BUN 19 6 - 20 mg/dL    Creatinine 0.8 0.5 - 1.4 mg/dL    Calcium 10.5 8.7 - 10.5 mg/dL    Anion Gap 14 8 - 16 mmol/L    eGFR if African American >60 >60 mL/min/1.73 m^2    eGFR if non African American >60 >60 mL/min/1.73 m^2   COVID-19 Rapid Screening    Collection Time: 04/18/22  1:19 PM   Result Value Ref Range    SARS-CoV-2 RNA, Amplification, Qual Negative Negative   POCT glucose    Collection Time: 04/18/22  5:35 PM   Result Value Ref Range    POCT Glucose 266 (H) 70 - 110 mg/dL   POCT glucose    Collection Time: 04/18/22  7:43 PM   Result Value Ref Range    POCT Glucose 189 (H) 70 - 110 mg/dL   Lactic acid, plasma    Collection Time: 04/18/22  7:46 PM   Result Value Ref Range    Lactate (Lactic Acid) 1.3 0.5 - 2.2 mmol/L   Blood culture    Collection Time: 04/18/22  7:46 PM    Specimen: Antecubital, Right Arm; Blood   Result Value Ref Range    Blood Culture, Routine No Growth to date    Blood culture    Collection Time: 04/18/22  7:46 PM    Specimen: Antecubital, Left Arm; Blood   Result Value Ref Range    Blood Culture, Routine No Growth to date    POCT glucose    Collection Time: 04/19/22  5:09 AM   Result Value Ref Range    POCT Glucose 246 (H) 70 - 110 mg/dL   CBC Auto Differential    Collection Time: 04/19/22  5:12 AM   Result Value Ref Range    WBC 18.05 (H) 3.90 - 12.70 K/uL    RBC 3.91 (L) 4.00 - 5.40 M/uL    Hemoglobin 10.7 (L) 12.0 - 16.0 g/dL    Hematocrit 35.4 (L) 37.0 - 48.5 %    MCV 91 82 - 98 fL    MCH 27.4 27.0 - 31.0 pg    MCHC 30.2 (L) 32.0 - 36.0 g/dL    RDW 16.1 (H) 11.5 - 14.5 %    Platelets 536 (H) 150 - 450 K/uL    MPV 10.5 9.2 - 12.9 fL    Immature Granulocytes CANCELED 0.0 - 0.5 %    Immature Grans (Abs) CANCELED 0.00 - 0.04 K/uL    nRBC 0 0 /100 WBC    Gran % 78.0 (H) 38.0 - 73.0 %    Lymph % 15.0 (L)  18.0 - 48.0 %    Mono % 1.0 (L) 4.0 - 15.0 %    Eosinophil % 1.0 0.0 - 8.0 %    Basophil % 2.0 (H) 0.0 - 1.9 %    Bands 1.0 %    Myelocytes 2.0 %    Platelet Estimate Increased (A)     Aniso Slight     Poik Slight     Hypo Occasional     Ovalocytes Occasional     Acanthocytes Present     Differential Method Manual    Comprehensive metabolic panel    Collection Time: 04/19/22  5:12 AM   Result Value Ref Range    Sodium 134 (L) 136 - 145 mmol/L    Potassium 5.5 (H) 3.5 - 5.1 mmol/L    Chloride 97 95 - 110 mmol/L    CO2 23 23 - 29 mmol/L    Glucose 242 (H) 70 - 110 mg/dL    BUN 29 (H) 6 - 20 mg/dL    Creatinine 1.0 0.5 - 1.4 mg/dL    Calcium 10.5 8.7 - 10.5 mg/dL    Total Protein 7.9 6.0 - 8.4 g/dL    Albumin 3.4 (L) 3.5 - 5.2 g/dL    Total Bilirubin 0.2 0.1 - 1.0 mg/dL    Alkaline Phosphatase 87 55 - 135 U/L    AST 14 10 - 40 U/L    ALT 25 10 - 44 U/L    Anion Gap 14 8 - 16 mmol/L    eGFR if African American >60 >60 mL/min/1.73 m^2    eGFR if non African American >60 >60 mL/min/1.73 m^2   Protime-INR    Collection Time: 04/19/22  6:40 AM   Result Value Ref Range    Prothrombin Time 10.3 9.0 - 12.5 sec    INR 1.0 0.8 - 1.2   POCT glucose    Collection Time: 04/19/22 11:26 AM   Result Value Ref Range    POCT Glucose 249 (H) 70 - 110 mg/dL      Lab Results   Component Value Date    VALPROATE 64.3 04/16/2022    CBMZ 3.6 (L) 01/26/2021         EXAMINATION    VITALS   Vitals:    04/19/22 0749 04/19/22 1123 04/19/22 1124 04/19/22 1148   BP:  (!) 119/55     BP Location:  Right arm     Patient Position:  Lying     Pulse: (!) 130 109     Resp: (!) 22 18 18    Temp:  97.3 °F (36.3 °C)     TempSrc:  Oral     SpO2: 97% 99%  98%   Weight:       Height:            CONSTITUTIONAL  General Appearance: NAD, unremarkable, age appropriate, disheveled, pacing around room, overweight     MUSCULOSKELETAL  Muscle Strength and Tone: WNL  Abnormal Involuntary Movements: none observed   Gait and Station: WNL; non-ataxic  "     PSYCHIATRIC   Behavior/Cooperation:  cooperative, restless and fidgety , eye contact intermittent   Speech:  normal tone, normal pitch, normal volume, rapid/pressured   Language: grossly intact, able to name, able to repeat with spontaneous speech  Mood: "I'm fine"  Affect:  Labile ; Elevated ; Not Congruent with Endorsed Mood   Associations: intermittent TERRI  Thought Process: Linear with intermittent tangential / TERRI ; disorganization   Thought Content: + paranoia ; denies SI, HI, AH, VH, TH, delusions (no RIS observed)  Sensorium:  Awake  Alert and Oriented: to person, place, situation, month of year, year  Memory: 3/3 immediate, 2/3 at 5 minutes               Recent: Intact; able to report recent events              Remote: Intact; Named 4/4 past presidents   Attention/concentration: Fair.  Requiring frequent redirection. Appropriate for age/education. Able to spell w-o-r-l-d & d-l-r-o-w.   Similarities: Intact (difference between apple and orange?)  Abstract reasoning: Limited   Insight: Limited  Judgment: Limited     CAM ICU Delirium Assessment - NEGATIVE        Is the patient aware of the biomedical complications associated with substance abuse and mental illness? yes           MEDICAL DECISION MAKING     ASSESSMENT      Bipolar I Disorder (currently severe with mixed s/s of depression and bibi)   Unspecified Insomnia   Hx of ADHD      RECOMMENDATIONS       - Continue PEC/CEC for grave disability 2/2 mental illness at this time.  Patient's PEC/CEC set to  on 2022.  Will proceed with filing CHANCE petition paperwork with hospital  given that patient continues to appear gravely disabled due to mental illness at this time.       - Once medically cleared / stable, seek transfer back to [facility] (or another inpatient psychiatric facility; patient may need Med-Psych placement) for continued mental health treatment / stabilization.     - Increase Risperdal to 2 mg PO QAM & 3 mg PO QHS and " increase Depakote ER to 500 mg PO QAM and 1250 mg PO QHS for Bipolar I Disorder and insomnia (discussed risks/benefits/alt vs no treatment with patient)     - Continue Vistaril 50 mg PO TID PRN for anxiety and/or insomnia (discussed risks/benefits/alt vs no treatment with patient)     - HOLD previously outpt prescribed Vyvanse (discussed risks/benefits/alt vs no treatment with patient)     - Psychotherapy was again performed with patient as noted above with a focus on improving mood / bibi / thought process and sleep.     - Patient's most recent labs, imaging, and EKG were reviewed again today; VPA level on 04/16/2022 was 64.3      - Continue suicide / violence precautions and continue to monitor the patient with a sitter while on a PEC / CEC / CHANCE filed status.       - Thank you for this consult ; our team will continue to follow patient         Total time spent with patient and/or managing/coordinating patient's care today (excluding the time spent on psychotherapy): 41 minutes   Time spent on psychotherapy today (as noted above): 19 minutes   Total time for encounter today including psychotherapy: 60 minutes      More than 50% of the time was spent counseling/coordinating care.     Consulting clinician was informed of the encounter and consult note.      STAFF:  Magdiel Del Real MD  [facility]  4/19/2022

## 2024-10-02 DIAGNOSIS — Z89.512 S/P BKA (BELOW KNEE AMPUTATION) UNILATERAL, LEFT: ICD-10-CM

## 2024-10-02 DIAGNOSIS — G54.6 PHANTOM LIMB PAIN: ICD-10-CM

## 2024-10-02 RX ORDER — TRAMADOL HYDROCHLORIDE 50 MG/1
50 TABLET ORAL EVERY 8 HOURS PRN
Qty: 60 TABLET | Refills: 0 | Status: SHIPPED | OUTPATIENT
Start: 2024-10-02

## 2024-10-02 NOTE — TELEPHONE ENCOUNTER
Care Due:                  Date            Visit Type   Department     Provider  --------------------------------------------------------------------------------                                [facility]                              PRIMARY      MEDICINE/  Last Visit: 08-      CARE ([facility])   INTERNAL MED   Donaldo Pena                              [facility]                              PRIMARY      MEDICINE/  Next Visit: 12-      CARE ([facility])   INTERNAL MED   Donaldo Pena                                                            Last  Test          Frequency    Reason                     Performed    Due Date  --------------------------------------------------------------------------------    HBA1C.......  6 months...  insulin, metFORMIN,        04-   10-                             semaglutide..............    [employer] Embedded Care Due Messages. Reference number: [id number].   10/02/2024 11:02:55 AM CDT

## 2024-10-07 ENCOUNTER — TELEPHONE (OUTPATIENT)
Dept: FAMILY MEDICINE | Facility: CLINIC | Age: 54
End: 2024-10-07
Payer: MEDICAID
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
## 2024-10-10 ENCOUNTER — PATIENT OUTREACH (OUTPATIENT)
Dept: ADMINISTRATIVE | Facility: HOSPITAL | Age: 54
End: 2024-10-10
Payer: MEDICAID

## 2024-10-10 DIAGNOSIS — E11.42 TYPE 2 DIABETES MELLITUS WITH DIABETIC POLYNEUROPATHY, WITHOUT LONG-TERM CURRENT USE OF INSULIN: Primary | Chronic | ICD-10-CM

## 2024-10-11 ENCOUNTER — LAB VISIT (OUTPATIENT)
Dept: LAB | Facility: HOSPITAL | Age: 54
End: 2024-10-11
Attending: INTERNAL MEDICINE
Payer: MEDICAID

## 2024-10-11 DIAGNOSIS — E11.42 TYPE 2 DIABETES MELLITUS WITH DIABETIC POLYNEUROPATHY, WITH LONG-TERM CURRENT USE OF INSULIN: ICD-10-CM

## 2024-10-11 DIAGNOSIS — Z79.4 TYPE 2 DIABETES MELLITUS WITH DIABETIC POLYNEUROPATHY, WITH LONG-TERM CURRENT USE OF INSULIN: ICD-10-CM

## 2024-10-11 LAB
ALBUMIN SERPL BCP-MCNC: 3.2 G/DL (ref 3.5–5.2)
ALP SERPL-CCNC: 90 U/L (ref 55–135)
ALT SERPL W/O P-5'-P-CCNC: 12 U/L (ref 10–44)
ANION GAP SERPL CALC-SCNC: 15 MMOL/L (ref 8–16)
AST SERPL-CCNC: 8 U/L (ref 10–40)
BILIRUB SERPL-MCNC: 0.2 MG/DL (ref 0.1–1)
BUN SERPL-MCNC: 11 MG/DL (ref 6–20)
CALCIUM SERPL-MCNC: 9.2 MG/DL (ref 8.7–10.5)
CHLORIDE SERPL-SCNC: 92 MMOL/L (ref 95–110)
CHOLEST SERPL-MCNC: 153 MG/DL (ref 120–199)
CHOLEST/HDLC SERPL: 3.9 {RATIO} (ref 2–5)
CO2 SERPL-SCNC: 28 MMOL/L (ref 23–29)
CREAT SERPL-MCNC: 0.8 MG/DL (ref 0.5–1.4)
ERYTHROCYTE [DISTWIDTH] IN BLOOD BY AUTOMATED COUNT: 14.6 % (ref 11.5–14.5)
EST. GFR  (NO RACE VARIABLE): >60 ML/MIN/1.73 M^2
ESTIMATED AVG GLUCOSE: 335 MG/DL (ref 68–131)
GLUCOSE SERPL-MCNC: 363 MG/DL (ref 70–110)
HBA1C MFR BLD: 13.3 % (ref 4–5.6)
HCT VFR BLD AUTO: 44.2 % (ref 37–48.5)
HDLC SERPL-MCNC: 39 MG/DL (ref 40–75)
HDLC SERPL: 25.5 % (ref 20–50)
HGB BLD-MCNC: 13.9 G/DL (ref 12–16)
LDLC SERPL CALC-MCNC: ABNORMAL MG/DL (ref 63–159)
MCH RBC QN AUTO: 28.5 PG (ref 27–31)
MCHC RBC AUTO-ENTMCNC: 31.4 G/DL (ref 32–36)
MCV RBC AUTO: 91 FL (ref 82–98)
NONHDLC SERPL-MCNC: 114 MG/DL
PLATELET # BLD AUTO: 410 K/UL (ref 150–450)
PMV BLD AUTO: 11.8 FL (ref 9.2–12.9)
POTASSIUM SERPL-SCNC: 3.7 MMOL/L (ref 3.5–5.1)
PROT SERPL-MCNC: 6.4 G/DL (ref 6–8.4)
RBC # BLD AUTO: 4.88 M/UL (ref 4–5.4)
SODIUM SERPL-SCNC: 135 MMOL/L (ref 136–145)
TRIGL SERPL-MCNC: 415 MG/DL (ref 30–150)
WBC # BLD AUTO: 14.05 K/UL (ref 3.9–12.7)

## 2024-10-11 PROCEDURE — 85027 COMPLETE CBC AUTOMATED: CPT | Performed by: INTERNAL MEDICINE

## 2024-10-11 PROCEDURE — 80053 COMPREHEN METABOLIC PANEL: CPT | Performed by: INTERNAL MEDICINE

## 2024-10-11 PROCEDURE — 80061 LIPID PANEL: CPT | Performed by: INTERNAL MEDICINE

## 2024-10-11 PROCEDURE — 83036 HEMOGLOBIN GLYCOSYLATED A1C: CPT | Performed by: INTERNAL MEDICINE

## 2024-10-17 DIAGNOSIS — Z86.718 HISTORY OF DVT (DEEP VEIN THROMBOSIS): ICD-10-CM

## 2024-10-17 RX ORDER — APIXABAN 5 MG/1
5 TABLET, FILM COATED ORAL 2 TIMES DAILY
Qty: 60 TABLET | Refills: 2 | Status: SHIPPED | OUTPATIENT
Start: 2024-10-17

## 2024-10-17 RX ORDER — MELOXICAM 7.5 MG/1
TABLET ORAL
Qty: 30 TABLET | Refills: 1 | Status: SHIPPED | OUTPATIENT
Start: 2024-10-17

## 2024-10-17 NOTE — TELEPHONE ENCOUNTER
No care due was identified.  [facility] Embedded Care Due Messages. Reference number: [id number].   10/17/2024 5:53:28 AM CDT

## 2024-10-22 DIAGNOSIS — E11.42 TYPE 2 DIABETES MELLITUS WITH DIABETIC POLYNEUROPATHY, WITH LONG-TERM CURRENT USE OF INSULIN: ICD-10-CM

## 2024-10-22 DIAGNOSIS — Z79.4 TYPE 2 DIABETES MELLITUS WITH DIABETIC POLYNEUROPATHY, WITH LONG-TERM CURRENT USE OF INSULIN: ICD-10-CM

## 2024-10-22 RX ORDER — SEMAGLUTIDE 1.34 MG/ML
1 INJECTION, SOLUTION SUBCUTANEOUS
Qty: 3 ML | OUTPATIENT
Start: 2024-10-22

## 2024-10-22 NOTE — TELEPHONE ENCOUNTER
No care due was identified.  [facility] Embedded Care Due Messages. Reference number: [id number].   10/22/2024 11:23:16 AM CDT

## 2024-10-22 NOTE — TELEPHONE ENCOUNTER
Refill Decision Note   Audrey Natarajan  is requesting a refill authorization.  Brief Assessment and Rationale for Refill:  Approve     Medication Therapy Plan:         Comments:     Note composed:5:22 PM 10/22/2024

## 2024-10-22 NOTE — TELEPHONE ENCOUNTER
Refill Decision Note   Audrey Natarajan  is requesting a refill authorization.  Brief Assessment and Rationale for Refill:  Quick Discontinue     Medication Therapy Plan: Ozempic increased to 2 mg pen on 4/19/24      Comments:     Note composed:2:35 PM 10/22/2024

## 2024-10-22 NOTE — TELEPHONE ENCOUNTER
----- Message from Meliton sent at 10/22/2024  2:48 PM CDT -----  Regarding: self  Type: RX Refill Request    Who Called:self    Have you contacted your pharmacy:    Refill    RX Name and Strength:semaglutide (OZEMPIC) 2 mg/dose (8 mg/3 mL) PnIj    Preferred Pharmacy with phone number:  [id number] [address] AT [address]  [place] [address]  Phone: [phone] Fax: [phone]        Local or Mail Order:[phone]    Would the patient rather a call back or a response via [facility]? callback    Best Call Back Number:[phone]    Additional Information:

## 2024-10-22 NOTE — TELEPHONE ENCOUNTER
No care due was identified.  [facility] Embedded Care Due Messages. Reference number: [id number].   10/22/2024 2:59:18 PM CDT

## 2024-10-23 ENCOUNTER — OFFICE VISIT (OUTPATIENT)
Dept: URGENT CARE | Facility: CLINIC | Age: 54
End: 2024-10-23
Payer: MEDICAID

## 2024-10-23 VITALS
HEART RATE: 107 BPM | RESPIRATION RATE: 18 BRPM | OXYGEN SATURATION: 95 % | HEIGHT: 66 IN | SYSTOLIC BLOOD PRESSURE: 160 MMHG | DIASTOLIC BLOOD PRESSURE: 85 MMHG | WEIGHT: 231 LBS | BODY MASS INDEX: 37.12 KG/M2 | TEMPERATURE: 99 F

## 2024-10-23 DIAGNOSIS — M25.50 ARTHRALGIA, UNSPECIFIED JOINT: Primary | ICD-10-CM

## 2024-10-23 PROCEDURE — 99212 OFFICE O/P EST SF 10 MIN: CPT | Mod: S$GLB,,,

## 2024-10-23 RX ORDER — DICLOFENAC SODIUM 10 MG/G
2 GEL TOPICAL 4 TIMES DAILY PRN
Qty: 200 G | Refills: 0 | Status: SHIPPED | OUTPATIENT
Start: 2024-10-23

## 2024-10-23 NOTE — PATIENT INSTRUCTIONS
Apply a compressive ACE bandage.   Rest and elevate the affected painful area.    Apply cold compresses intermittently as needed.    Avoid activities that cause pain.   As pain recedes, begin normal activities slowly as tolerated.      Apply the Voltaren gel to hands and knees. Continue meloxicam, tramadol, gabapentin as prescribed. You can also try extra strength tylenol (650-1000 mg) every 8 hours as needed for added pain relief.      Call your PCP tomorrow for a re-eval.   Follow up with pain management for chronic pain. A referral was placed for you, call [phone] to schedule appointment.         Elevated BP  Check your BP twice per day and keep a log.  Continue blood pressure medications as directed and do not miss doses.     Try a DASH diet. I have attached information in your discharge paperwork to review. A Mediterranean diet will help to decrease inflammation and would help lower your BP. Increase water intake to help your body get rid of an increase in salt. Avoid alcohol as this can increase your BP. Increase aerobic activity to 3 times per week and 45 min at a time. Practice good sleep hygiene by sleeping in a dark, cool, comfortable bed. No tv/device use before bed or tv while sleeping. It is recommended to get 7-8 hours of uninterrupted sleep per night.     The recommended daily fluid intake for women is 2.7 liters (five 16 oz bottles).     Follow up with your PCP for further management.     Go to the ER if your BP is consistently >160/100 and symptomatic with a severe headache, vomiting and unable to tolerate fluids, numbness/tingling/weakness to body, difficulty speaking, balance difficulty, confusion.          ([facility] recommendation)  The Mediterranean diet is based on plant-based foods and healthy fats.     You eat LOTS of vegetables, fruits, beans, lentils, nuts, whole grains (wheat bread, brown rice) & extra virgin olive oil.   ---- These foods are high in fiber and antioxidants.   ----  These nutrients help reduce inflammation.   ---- Fiber helps regulate bowel movements.   ---- Antioxidants protect you against cancer.     Eat a moderate amount of fish (salmon, herring, mackerel, sardines, anchovies, halibut, rainbow trout, tuna)   ---- (fish are high in omega-3 fatty acids)    Eat a moderate amount of cheese and yogurt.    Eat little or no meat-- eat more poultry (baked chicken, grilled chicken, ground turkey)  ---- avoid red meat and pork (causes inflammation and linked to colon cancer)    Eat little or no sweats, sugary drinks or butter.     Limit your sodium intake. A diet high in salt can increase your blood pressure and predisposes you to a heart attack or stroke.     BENEFITS OF DIET  --- Lowers your risk of cardiovascular disease (heart attack, stroke) and many other diseases.   --- Supports a healthy body weight.   --- Supports a healthy blood sugar, blood pressure and cholesterol.   --- Lowers your risk of metabolic syndrome   --- Supports a heathy balance of good gut bacteria in your digestive system.   --- Lowers your risk for cancer.   --- Helps with brain function and slows decline as we age.   --- Helps you live longer.     Talk to your primary care doctor about a dietician referral for further guidance.

## 2024-10-23 NOTE — PROGRESS NOTES
"Subjective:      Patient ID: Audrey Natarajan is a 54 y.o. female.    Vitals:  height is 5' 6" (1.676 m) and weight is 104.8 kg (231 lb). Her oral temperature is 99.2 °F (37.3 °C). Her blood pressure is 160/85 (abnormal) and her pulse is 107. Her respiration is 18 and oxygen saturation is 95%.     Chief Complaint: Joint Pain    Pt states that she is having joint pain in all major joints along with swelling that started 3 days ago. Pain level in 9. Pt reports joint pain worsens with the cold weather. Taking tramadol, gabapentin without relief. Recently prescribed with meloxicam a few days ago with some relief. She is currently on a blood thinner. Hx of T2DM. Pt requested toradol or steroid injection.     Joint Pain  This is a new problem. The current episode started in the past 7 days. The problem occurs constantly. The problem has been unchanged. Associated symptoms include arthralgias. Pertinent negatives include no chills, fatigue or fever. Treatments tried: prescription medication.       Constitution: Negative for chills, fatigue and fever.   Musculoskeletal:  Positive for joint pain and arthritis. Negative for pain, trauma and abnormal ROM of joint.   Skin:  Negative for erythema.      Objective:     Physical Exam   Constitutional: She is oriented to person, place, and time. She appears well-developed.   HENT:   Head: Normocephalic and atraumatic. Head is without abrasion, without contusion and without laceration.   Ears:   Right Ear: External ear normal.   Left Ear: External ear normal.   Nose: Nose normal.   Mouth/Throat: Oropharynx is clear and moist and mucous membranes are normal.   Eyes: Conjunctivae, EOM and lids are normal. Pupils are equal, round, and reactive to light.   Neck: Trachea normal and phonation normal. Neck supple.   Cardiovascular: Normal rate, regular rhythm and normal heart sounds.   Pulmonary/Chest: Effort normal and breath sounds normal. No stridor. No respiratory distress. "   Musculoskeletal: Normal range of motion.         General: Normal range of motion.      Right hand: She exhibits tenderness and swelling (no erythema). She exhibits normal range of motion.      Left hand: She exhibits tenderness and swelling (no erythema). She exhibits normal range of motion.   Neurological: She is alert and oriented to person, place, and time.   Skin: Skin is warm, dry, intact and no rash. Capillary refill takes less than 2 seconds. No abrasion, No burn, No bruising, No erythema and No ecchymosis   Psychiatric: Her speech is normal and behavior is normal. Judgment and thought content normal.   Nursing note and vitals reviewed.      Assessment:     1. Arthralgia, unspecified joint        Plan:       Arthralgia, unspecified joint  -     diclofenac sodium (VOLTAREN) 1 % Gel; Apply 2 g topically 4 (four) times daily as needed (bilateral hands and knees).  Dispense: 200 g; Refill: 0  -     Ambulatory referral/consult to Pain Clinic        Discussed that toradol is contraindicated with oral anti-coagulant use.  BP elevated today and hx of DM (last A1c 13). Discussed how steroids might worsen HTN and raise glucose levels. Advised to add tylenol to regimen (tramadol, gabapentin, meloxicam). Diclofenac gel to joints. RICE. Pt is to call PCP for recheck in a few days. Pain management referral made.         Discussed results/diagnosis/plan with patient in clinic. Strict precautions given to patient to monitor for worsening signs and symptoms. Advised to follow up with PCP or specialist.  Explained side effects of medications prescribed with patient and informed him/her to discontinue use if he/she has any side effects and to inform UC or PCP if this occurs. All questions answered. Strict ED verses clinic return precautions stressed and given in depth. Advised if symptoms worsens of fail to improve he/she should go to the Emergency Room. Discharge and follow-up instructions given verbally/printed with the  patient who expressed understanding and willingness to comply with my recommendations. Patient voiced understanding and in agreement with current treatment plan. Patient exits the exam room in no acute distress. Conversant and engaged during discharge discussion, verbalized understanding.

## 2024-10-25 ENCOUNTER — TELEPHONE (OUTPATIENT)
Dept: FAMILY MEDICINE | Facility: CLINIC | Age: 54
End: 2024-10-25
Payer: MEDICAID

## 2024-10-30 DIAGNOSIS — Z89.512 S/P BKA (BELOW KNEE AMPUTATION) UNILATERAL, LEFT: ICD-10-CM

## 2024-10-30 DIAGNOSIS — G54.6 PHANTOM LIMB PAIN: ICD-10-CM

## 2024-10-30 RX ORDER — TRAMADOL HYDROCHLORIDE 50 MG/1
50 TABLET ORAL EVERY 8 HOURS PRN
Qty: 60 TABLET | Refills: 0 | Status: SHIPPED | OUTPATIENT
Start: 2024-10-30

## 2024-10-31 ENCOUNTER — OFFICE VISIT (OUTPATIENT)
Dept: PODIATRY | Facility: CLINIC | Age: 54
End: 2024-10-31
Payer: MEDICAID

## 2024-10-31 VITALS
BODY MASS INDEX: 37.14 KG/M2 | OXYGEN SATURATION: 95 % | SYSTOLIC BLOOD PRESSURE: 127 MMHG | HEIGHT: 66 IN | HEART RATE: 102 BPM | DIASTOLIC BLOOD PRESSURE: 59 MMHG | RESPIRATION RATE: 18 BRPM | WEIGHT: 231.06 LBS

## 2024-10-31 DIAGNOSIS — B35.1 ONYCHOMYCOSIS DUE TO DERMATOPHYTE: ICD-10-CM

## 2024-10-31 DIAGNOSIS — E11.49 TYPE II DIABETES MELLITUS WITH NEUROLOGICAL MANIFESTATIONS: Primary | ICD-10-CM

## 2024-10-31 DIAGNOSIS — M20.40 HAMMER TOE, UNSPECIFIED LATERALITY: ICD-10-CM

## 2024-10-31 DIAGNOSIS — Z89.512 S/P UNILATERAL BKA (BELOW KNEE AMPUTATION), LEFT: ICD-10-CM

## 2024-10-31 PROCEDURE — 99214 OFFICE O/P EST MOD 30 MIN: CPT | Mod: PBBFAC,PO,25 | Performed by: PODIATRIST

## 2024-10-31 PROCEDURE — 99999 PR PBB SHADOW E&M-EST. PATIENT-LVL IV: CPT | Mod: PBBFAC,,, | Performed by: PODIATRIST

## 2024-10-31 PROCEDURE — 11720 DEBRIDE NAIL 1-5: CPT | Mod: PBBFAC,PO | Performed by: PODIATRIST

## 2024-11-01 ENCOUNTER — OFFICE VISIT (OUTPATIENT)
Dept: FAMILY MEDICINE | Facility: CLINIC | Age: 54
End: 2024-11-01
Payer: MEDICAID

## 2024-11-01 VITALS
OXYGEN SATURATION: 96 % | SYSTOLIC BLOOD PRESSURE: 108 MMHG | BODY MASS INDEX: 37.12 KG/M2 | HEART RATE: 93 BPM | DIASTOLIC BLOOD PRESSURE: 60 MMHG | TEMPERATURE: 98 F | WEIGHT: 231 LBS | HEIGHT: 66 IN

## 2024-11-01 DIAGNOSIS — Z79.4 TYPE 2 DIABETES MELLITUS WITH DIABETIC POLYNEUROPATHY, WITH LONG-TERM CURRENT USE OF INSULIN: Primary | ICD-10-CM

## 2024-11-01 DIAGNOSIS — I10 ESSENTIAL HYPERTENSION: ICD-10-CM

## 2024-11-01 DIAGNOSIS — E11.42 TYPE 2 DIABETES MELLITUS WITH DIABETIC POLYNEUROPATHY, WITH LONG-TERM CURRENT USE OF INSULIN: Primary | ICD-10-CM

## 2024-11-01 DIAGNOSIS — F31.9 BIPOLAR 1 DISORDER: ICD-10-CM

## 2024-11-01 DIAGNOSIS — Z89.512 S/P BKA (BELOW KNEE AMPUTATION) UNILATERAL, LEFT: ICD-10-CM

## 2024-11-01 PROCEDURE — 99999 PR PBB SHADOW E&M-EST. PATIENT-LVL V: CPT | Mod: PBBFAC,,, | Performed by: INTERNAL MEDICINE

## 2024-11-01 PROCEDURE — 99215 OFFICE O/P EST HI 40 MIN: CPT | Mod: PBBFAC,PN | Performed by: INTERNAL MEDICINE

## 2024-11-01 RX ORDER — INSULIN ASPART 100 [IU]/ML
10 INJECTION, SOLUTION INTRAVENOUS; SUBCUTANEOUS 3 TIMES DAILY
Qty: 9 ML | Refills: 1 | Status: SHIPPED | OUTPATIENT
Start: 2024-11-01

## 2024-11-11 DIAGNOSIS — Z79.4 TYPE 2 DIABETES MELLITUS WITH DIABETIC POLYNEUROPATHY, WITH LONG-TERM CURRENT USE OF INSULIN: ICD-10-CM

## 2024-11-11 DIAGNOSIS — E11.42 TYPE 2 DIABETES MELLITUS WITH DIABETIC POLYNEUROPATHY, WITH LONG-TERM CURRENT USE OF INSULIN: ICD-10-CM

## 2024-11-11 DIAGNOSIS — I10 ESSENTIAL HYPERTENSION: ICD-10-CM

## 2024-11-11 RX ORDER — METOPROLOL TARTRATE 25 MG/1
25 TABLET, FILM COATED ORAL 2 TIMES DAILY
Qty: 180 TABLET | Refills: 3 | Status: SHIPPED | OUTPATIENT
Start: 2024-11-11

## 2024-11-11 RX ORDER — PEN NEEDLE, DIABETIC 32GX 5/32"
NEEDLE, DISPOSABLE MISCELLANEOUS
Qty: 400 EACH | Refills: 3 | Status: SHIPPED | OUTPATIENT
Start: 2024-11-11

## 2024-11-11 NOTE — TELEPHONE ENCOUNTER
No care due was identified.  [facility] Embedded Care Due Messages. Reference number: [id number].   11/11/2024 6:36:51 AM CST

## 2024-11-11 NOTE — TELEPHONE ENCOUNTER
Refill Routing Note   Medication(s) are not appropriate for processing by [facility] for the following reason(s):        Drug-disease interaction    [facility] action(s):  Defer      Medication Therapy Plan: PAD      Appointments  past 12m or future 3m with PCP    Date Provider   Last Visit   11/1/2024 Doanldo Pena MD   Next Visit   12/10/2024 Donaldo Pena MD   ED visits in past 90 days: 0        Note composed:7:27 AM 11/11/2024

## 2024-11-11 NOTE — TELEPHONE ENCOUNTER
No care due was identified.  [facility] Embedded Care Due Messages. Reference number: [id number].   11/11/2024 11:35:39 AM CST

## 2024-11-11 NOTE — TELEPHONE ENCOUNTER
Refill Decision Note   Audrey Natarajan  is requesting a refill authorization.  Brief Assessment and Rationale for Refill:  Approve     Medication Therapy Plan:        Comments:     Note composed:4:08 PM 11/11/2024

## 2024-11-13 DIAGNOSIS — Z89.512 S/P BKA (BELOW KNEE AMPUTATION) UNILATERAL, LEFT: ICD-10-CM

## 2024-11-13 RX ORDER — METHOCARBAMOL 500 MG/1
TABLET, FILM COATED ORAL
Qty: 45 TABLET | Refills: 1 | Status: SHIPPED | OUTPATIENT
Start: 2024-11-13

## 2024-11-13 NOTE — TELEPHONE ENCOUNTER
No care due was identified.  Health [facility] Embedded Care Due Messages. Reference number: [id number].   11/13/2024 11:35:54 AM CST

## 2024-11-13 NOTE — TELEPHONE ENCOUNTER
Refill Routing Note   Medication(s) are not appropriate for processing by [facility] for the following reason(s):        Outside of protocol    [facility] action(s):  Route             Appointments  past 12m or future 3m with PCP    Date Provider   Last Visit   11/1/2024 Donaldo Pena MD   Next Visit   12/10/2024 Donaldo Pena MD   ED visits in past 90 days: 0        Note composed:12:45 PM 11/13/2024

## 2024-11-15 DIAGNOSIS — G54.6 PHANTOM LIMB PAIN: ICD-10-CM

## 2024-11-15 NOTE — TELEPHONE ENCOUNTER
No care due was identified.  Health [facility] Embedded Care Due Messages. Reference number: [id number].   11/15/2024 4:49:49 PM CST

## 2024-11-15 NOTE — TELEPHONE ENCOUNTER
----- Message from Peterson sent at 11/15/2024  4:38 PM CST -----  Regarding: Self   Type: RX Refill Request    Who Called: Self     Have you contacted your pharmacy: yes     Refill    RX Name and Strength:LIDOcaine (LIDODERM) 5 %     Preferred Pharmacy with phone number: .  [id number] - [address] AT [address] [address]  Phone: [phone] Fax: [phone]          Local or Mail Order: local     Would the patient rather a call back or a response via [facility]? Call back     Best Call Back Number:.[phone]      Additional Information:     Thank you.

## 2024-11-16 NOTE — TELEPHONE ENCOUNTER
Refill Routing Note   Medication(s) are not appropriate for processing by [facility] for the following reason(s):        Outside of protocol    [facility] action(s):  Route             Appointments  past 12m or future 3m with PCP    Date Provider   Last Visit   11/1/2024 Donaldo Pena MD   Next Visit   12/10/2024 Donaldo Pena MD   ED visits in past 90 days: 0        Note composed:10:40 PM 11/15/2024

## 2024-11-18 ENCOUNTER — TELEPHONE (OUTPATIENT)
Dept: FAMILY MEDICINE | Facility: CLINIC | Age: 54
End: 2024-11-18
Payer: MEDICAID

## 2024-11-18 RX ORDER — LIDOCAINE 50 MG/G
PATCH TOPICAL
Qty: 15 PATCH | Refills: 1 | Status: SHIPPED | OUTPATIENT
Start: 2024-11-18

## 2024-11-18 NOTE — TELEPHONE ENCOUNTER
----- Message from Med Assistant De Dios sent at 11/18/2024  4:26 PM CST -----  Who called:  Pt   What is the request in detail:  Needs clearance letter  regarding her tooth extraction that needs to be faxed   Can the clinic reply by [facility]? No  No   Would the patient rather a call back or a response via [facility]? Call back  Callback   Best call back number:  Telephone Information:  Mobile          [phone]  Mobile          Not on file.     Additional Information:  Fax number [phone] [facility]   Scheduled for 12/3   Thank you.

## 2024-11-18 NOTE — LETTER
2024    Audrey Natarajan  [address]  [facility]  [place] [address]             [facility]  [address] [facility]  [address]  [facility] [place] [address]  Phone: [phone] To Whom It May Concern:    Ms Audrey Natarajan (: 1970) is a patient under my care for primary care. She has history of recurrent lower extremity deep vein thrombosis. She is medically optimized for dental procedure. She may hold apixiban (Eloquis) three days prior to her planned dental procedure and should resume, this medication the day following.    Please do not hesitate to contact my office should you have any questions regarding this matter.    Very Respectfully          Donaldo Pena M.D.

## 2024-11-18 NOTE — TELEPHONE ENCOUNTER
Pt states she needs medical clearance for tooth extraction. Pt is on blood thinner. I tried to scheduled pt with an in office visit at multiple locations. Due to insurance next avail will be next year march 2025. Pt needs clearance before 12/3/2024.

## 2024-11-19 ENCOUNTER — TELEPHONE (OUTPATIENT)
Dept: FAMILY MEDICINE | Facility: CLINIC | Age: 54
End: 2024-11-19
Payer: MEDICAID

## 2024-11-25 ENCOUNTER — TELEPHONE (OUTPATIENT)
Dept: FAMILY MEDICINE | Facility: CLINIC | Age: 54
End: 2024-11-25
Payer: MEDICAID

## 2024-11-25 DIAGNOSIS — Z89.512 S/P BKA (BELOW KNEE AMPUTATION) UNILATERAL, LEFT: ICD-10-CM

## 2024-11-25 DIAGNOSIS — G54.6 PHANTOM LIMB PAIN: ICD-10-CM

## 2024-11-25 RX ORDER — TRAMADOL HYDROCHLORIDE 50 MG/1
50 TABLET ORAL EVERY 8 HOURS PRN
Qty: 60 TABLET | Refills: 0 | Status: SHIPPED | OUTPATIENT
Start: 2024-11-25

## 2024-11-25 NOTE — TELEPHONE ENCOUNTER
No care due was identified.  Health [facility] Embedded Care Due Messages. Reference number: [id number].   11/25/2024 1:16:43 PM CST

## 2024-11-29 DIAGNOSIS — J44.9 CHRONIC OBSTRUCTIVE PULMONARY DISEASE, UNSPECIFIED COPD TYPE: ICD-10-CM

## 2024-11-29 RX ORDER — IPRATROPIUM BROMIDE AND ALBUTEROL SULFATE 2.5; .5 MG/3ML; MG/3ML
3 SOLUTION RESPIRATORY (INHALATION) EVERY 6 HOURS PRN
Qty: 90 EACH | Refills: 3 | Status: SHIPPED | OUTPATIENT
Start: 2024-11-29

## 2024-11-29 NOTE — TELEPHONE ENCOUNTER
No care due was identified.  Health [facility] Embedded Care Due Messages. Reference number: [id number].   11/29/2024 3:53:30 PM CST

## 2024-11-29 NOTE — TELEPHONE ENCOUNTER
----- Message from Carolina sent at 11/29/2024 11:30 AM CST -----  Regarding: Refill Request  Type: RX Refill Request      Who Called: Self       Refill or New Rx: refill       RX Name and Strength: breathing treatment machine           Preferred Pharmacy with phone number:  [id number] - [place] - [address] AT [facility]    Would the patient rather a call back or a response via [facility]? Call       Best Call Back Number: .[phone]

## 2024-11-30 NOTE — TELEPHONE ENCOUNTER
Refill Decision Note   Audrey Natarajan  is requesting a refill authorization.  Brief Assessment and Rationale for Refill:  Approve     Medication Therapy Plan:        Comments:     Note composed:9:57 PM 11/29/2024

## 2024-12-01 ENCOUNTER — OFFICE VISIT (OUTPATIENT)
Dept: URGENT CARE | Facility: CLINIC | Age: 54
End: 2024-12-01
Payer: MEDICAID

## 2024-12-01 VITALS
OXYGEN SATURATION: 94 % | BODY MASS INDEX: 37.12 KG/M2 | HEART RATE: 94 BPM | TEMPERATURE: 99 F | SYSTOLIC BLOOD PRESSURE: 121 MMHG | DIASTOLIC BLOOD PRESSURE: 75 MMHG | RESPIRATION RATE: 18 BRPM | WEIGHT: 231 LBS | HEIGHT: 66 IN

## 2024-12-01 DIAGNOSIS — B96.89 ACUTE BACTERIAL SINUSITIS: Primary | ICD-10-CM

## 2024-12-01 DIAGNOSIS — J01.90 ACUTE BACTERIAL SINUSITIS: Primary | ICD-10-CM

## 2024-12-01 LAB
CTP QC/QA: YES
SARS-COV-2 AG RESP QL IA.RAPID: NEGATIVE

## 2024-12-01 PROCEDURE — 99213 OFFICE O/P EST LOW 20 MIN: CPT | Mod: S$GLB,,,

## 2024-12-01 PROCEDURE — 87811 SARS-COV-2 COVID19 W/OPTIC: CPT | Mod: QW,S$GLB,,

## 2024-12-01 RX ORDER — DOXYCYCLINE 100 MG/1
100 CAPSULE ORAL 2 TIMES DAILY
Qty: 14 CAPSULE | Refills: 0 | Status: SHIPPED | OUTPATIENT
Start: 2024-12-01 | End: 2024-12-08

## 2024-12-01 RX ORDER — FLUTICASONE PROPIONATE 50 MCG
1 SPRAY, SUSPENSION (ML) NASAL DAILY
Qty: 16 ML | Refills: 0 | Status: SHIPPED | OUTPATIENT
Start: 2024-12-01

## 2024-12-01 RX ORDER — BENZONATATE 200 MG/1
200 CAPSULE ORAL 3 TIMES DAILY PRN
Qty: 30 CAPSULE | Refills: 0 | Status: SHIPPED | OUTPATIENT
Start: 2024-12-01 | End: 2024-12-11

## 2024-12-01 NOTE — PROGRESS NOTES
"Subjective:      Patient ID: Audrey Natarajan is a 54 y.o. female.    Vitals:  height is 5' 6" (1.676 m) and weight is 104.8 kg (231 lb). Her oral temperature is 98.5 °F (36.9 °C). Her blood pressure is 121/75 and her pulse is 94. Her respiration is 18 and oxygen saturation is 94% (abnormal).     Chief Complaint: Nasal Congestion    54-year-old female with history of hypertension, type 2 diabetes mellitus, COPD, PID, DVT on Eliquis here for coughing, nasal congestion, sinus pressure and pain, ear pressure for the past week.  Has been taking Robitussin and NyQuil.  She denies fever, chills, nausea, vomiting, diarrhea, abdominal pain, chest pain, SOB.    URI   This is a new problem. The current episode started in the past 7 days. The problem has been unchanged. There has been no fever. The cough is Productive of sputum. Associated symptoms include congestion, coughing, rhinorrhea and sinus pain. Pertinent negatives include no abdominal pain, chest pain, diarrhea, dysuria, ear pain, headaches, nausea, neck pain, rash or vomiting. She has tried decongestant for the symptoms. The treatment provided mild relief.       Constitution: Negative for chills and fever.   HENT:  Positive for congestion, sinus pain and sinus pressure. Negative for ear pain.    Neck: Negative for neck pain.   Cardiovascular:  Negative for chest pain.   Respiratory:  Positive for cough and sputum production. Negative for shortness of breath.    Gastrointestinal:  Negative for abdominal pain, nausea, vomiting and diarrhea.   Genitourinary:  Negative for dysuria.   Musculoskeletal:  Negative for muscle ache.   Skin:  Negative for rash.   Neurological:  Negative for dizziness and headaches.      Objective:     Physical Exam   Constitutional: She is oriented to person, place, and time. She appears well-developed.   HENT:   Head: Normocephalic and atraumatic.   Ears:   Right Ear: Tympanic membrane, external ear and ear canal normal.   Left Ear: " Tympanic membrane, external ear and ear canal normal.   Nose: Congestion present. Right sinus exhibits maxillary sinus tenderness and frontal sinus tenderness. Left sinus exhibits maxillary sinus tenderness and frontal sinus tenderness.   Mouth/Throat: Oropharynx is clear and moist. Mucous membranes are moist. Oropharynx is clear.   Eyes: Conjunctivae, EOM and lids are normal. Pupils are equal, round, and reactive to light.   Neck: Trachea normal and phonation normal. Neck supple.   Cardiovascular: Normal rate, regular rhythm, normal heart sounds and normal pulses.   Pulmonary/Chest: Effort normal and breath sounds normal.   Musculoskeletal: Normal range of motion.         General: Normal range of motion.   Neurological: no focal deficit. She is alert and oriented to person, place, and time.   Skin: Skin is warm, dry and intact. Capillary refill takes less than 2 seconds.   Psychiatric: Her speech is normal and behavior is normal. Judgment and thought content normal.   Nursing note and vitals reviewed.    Results for orders placed or performed in visit on 12/01/24   SARS Coronavirus 2 Antigen, POCT Manual Read    Collection Time: 12/01/24 10:56 AM   Result Value Ref Range    SARS Coronavirus 2 Antigen Negative Negative     Acceptable Yes      *Note: Due to a large number of results and/or encounters for the requested time period, some results have not been displayed. A complete set of results can be found in Results Review.       Assessment:     1. Acute bacterial sinusitis        Plan:       Acute bacterial sinusitis  -     SARS Coronavirus 2 Antigen, POCT Manual Read  -     doxycycline (VIBRAMYCIN) 100 MG Cap; Take 1 capsule (100 mg total) by mouth 2 (two) times daily. for 7 days  Dispense: 14 capsule; Refill: 0  -     benzonatate (TESSALON) 200 MG capsule; Take 1 capsule (200 mg total) by mouth 3 (three) times daily as needed for Cough.  Dispense: 30 capsule; Refill: 0  -     fluticasone propionate  "(FLONASE) 50 mcg/actuation nasal spray; 1 spray (50 mcg total) by Each Nostril route once daily.  Dispense: 16 mL; Refill: 0              Patient Instructions                                                             Sinusitis   If your condition worsens or fails to improve we recommend that you receive another evaluation at the ER immediately or contact your PCP to discuss your concerns or return here. You must understand that you've received an urgent care treatment only and that you may be released before all your medical problems are known or treated. You the patient will arrange for followup care as instructed.   If we discussed that I think your illness is viral it will not respond to antibiotics and it will last 10-14 days. However, if over the next few days the symptoms worsen start the antibiotics I have given you.   If we discussed that you require antibiotics start them now and take them to completion.   If you are female and on BCP and do take the antibiotics, use additional methods to prevent pregnancy while on the antibiotics and for one cycle after.   Flonase (fluticasone) is a nasal spray which is available over the counter and may help with your symptoms   Zyrtec D, Claritin D or allegra D can also help with symptoms of congestion and drainage.   If you have hypertension avoid using the "D" which is the decongestant   If you just have drainage you can take plain zyrtec, claritin or allegra   If you just have a congested feeling you can take pseudoephedrine (unless you have high blood pressure) which you have to sign for behind the counter. Do not buy the phenylephrine which is on the shelf as it is not effective   Rest and fluids are also important.   Tylenol or ibuprofen can also be used as directed for pain unless you have an allergy to them or medical condition such as stomach ulcers, kidney or liver disease or blood thinners etc for which you should not be taking these type of medications. "   If you are flying in the next few days Afrin nose drops for the airplane flight upon take off and landing may help. Other than at those times refrain from using afrin.   If you were prescribed a narcotic do not drive or operate heavy machinery while taking these medications.

## 2024-12-06 ENCOUNTER — OFFICE VISIT (OUTPATIENT)
Dept: URGENT CARE | Facility: CLINIC | Age: 54
End: 2024-12-06
Payer: MEDICAID

## 2024-12-06 VITALS
TEMPERATURE: 99 F | SYSTOLIC BLOOD PRESSURE: 124 MMHG | DIASTOLIC BLOOD PRESSURE: 78 MMHG | HEART RATE: 92 BPM | RESPIRATION RATE: 16 BRPM | HEIGHT: 66 IN | BODY MASS INDEX: 37.14 KG/M2 | OXYGEN SATURATION: 98 % | WEIGHT: 231.06 LBS

## 2024-12-06 DIAGNOSIS — K04.7 DENTAL ABSCESS: Primary | ICD-10-CM

## 2024-12-06 RX ORDER — CLINDAMYCIN HYDROCHLORIDE 300 MG/1
300 CAPSULE ORAL EVERY 8 HOURS
Qty: 21 CAPSULE | Refills: 0 | Status: SHIPPED | OUTPATIENT
Start: 2024-12-06 | End: 2024-12-13

## 2024-12-06 NOTE — PATIENT INSTRUCTIONS
Discharge instructions for Dental Abscess and Pain  Notify [facility], seen in Urgent Care  Pt is to continue with Tramadol 50 mgs by mouth every 8 hours for pain as prescribed per dentist with Tylenol Extra strength.  Pt to Start Clindamycin as prescribed for dental abscess  Push fluids maintain hydrating  When do I need to call the doctor?   You have a fever of 100.4°F (38°C) or higher or chills.  You have swelling or pain in your neck or throat.  You are not able to open your mouth or eat.  You have trouble breathing.  You have very bad pain that is not helped by pain medicines.  You have swelling in your gums or face.  You have discharge around the tooth.  You have a bad taste in your mouth.  You have lots of bleeding from the gums.  You have more pain after having a tooth pulled.  1) See orders for this visit as documented in the electronic medical record.  2) Symptomatic therapy suggested: use acetaminophen/ibuprofen every 6-8 hours prn pain or fever, push fluids.   3) Call or return to clinic prn if these symptoms worsen or fail to improve as anticipated.    Discussed results/diagnosis/plan with patient in clinic.  We had shared decision making for patient's treatment. Patient verbalized understanding and in agreement with current treatment plan.     Patient was instructed to return for re-evaluation with urgent care or PCP for continued outpatient workup and management if symptoms do not improve/worsening symptoms. Strict ED versus clinic precautions given in depth.    Discharge and follow-up instructions given verbally/printed with the patient who expressed understanding. The instructions and results are also available on [facility].     You must understand that you have received an Urgent Care treatment only and that you may be released before all of your medical problems are known or treated.   - You, the patient, will arrange for follow up care as instructed.   - Follow up with your PCP or specialty  clinic as directed in the next 1-2 weeks if not improved or as needed.  You can call [phone] to schedule an appointment with the appropriate provider.   - If your condition worsens or fails to improve we recommend that you receive another evaluation at the ER immediately or contact your PCP to discuss your concerns or return here.        AG Mueller

## 2024-12-06 NOTE — PROGRESS NOTES
"Subjective:      Patient ID: Audrey Natarajan is a 54 y.o. female.    Vitals:  height is 5' 6" (1.676 m) and weight is 104.8 kg (231 lb 0.7 oz). Her oral temperature is 98.6 °F (37 °C). Her blood pressure is 124/78 and her pulse is 92. Her respiration is 16 and oxygen saturation is 98%.     Chief Complaint: Dental Pain    Patient complains of dental pain on right side of the mouth after having 2 teeth removed 3 day ago. Pt was also seen in Urgent Care for Bacterial Sinusitis and started on Doxycycline pt had completed 9 day treatment with one day remaining. Pt has teeth  removed to bottom right and developed swelling at gum. Pt notified [facility] and was unable to be seen today.Pt has been taking Tramadol 50 mgs by mouth every 8 hours with ES Tylenol as prescribed per dentist.    Dental Pain   The dental pain is located in She's tooth (teeth). This is a new problem. The current episode started in the past 7 days. The problem has been unchanged. The pain is at a severity of 10/10. The pain is severe. Associated symptoms include facial pain and thermal sensitivity. Pertinent negatives include no fever. The treatment provided no relief.       Constitution: Negative for chills, sweating, fatigue and fever.   HENT:  Negative for congestion and sore throat.    Neck: Negative for neck pain and neck stiffness.   Cardiovascular:  Negative for chest pain, leg swelling, palpitations and sob on exertion.   Eyes:  Negative for eye pain, eye redness and vision loss.   Respiratory:  Negative for cough, sputum production and shortness of breath.    Gastrointestinal:  Negative for abdominal pain, nausea, vomiting and diarrhea.   Genitourinary:  Negative for dysuria, frequency and urgency.   Musculoskeletal:  Negative for pain and trauma.   Skin:  Negative for color change and rash.   Neurological:  Negative for dizziness, headaches and disorientation.   Psychiatric/Behavioral:  Negative for disorientation.         Objective: "     Physical Exam   Constitutional: She is oriented to person, place, and time. She appears well-developed. She is cooperative.  Non-toxic appearance. She does not appear ill. No distress. awake  HENT:   Head: Normocephalic and atraumatic.   Ears:   Right Ear: Hearing, tympanic membrane, external ear and ear canal normal.   Left Ear: Hearing, tympanic membrane, external ear and ear canal normal.   Nose: Nose normal. No mucosal edema, rhinorrhea, nasal deformity or congestion. No epistaxis. Right sinus exhibits no maxillary sinus tenderness and no frontal sinus tenderness. Left sinus exhibits no maxillary sinus tenderness and no frontal sinus tenderness.   Mouth/Throat: Uvula is midline, oropharynx is clear and moist and mucous membranes are normal. No trismus in the jaw. Abnormal dentition. Dental abscesses present. No uvula swelling. No oropharyngeal exudate, posterior oropharyngeal edema, posterior oropharyngeal erythema, tonsillar abscesses or cobblestoning.       2 Teeth removed to right lower gum line with inflammation, and localized swelling, negative for drainage.      Comments: 2 Teeth removed to right lower gum line with inflammation, and localized swelling, negative for drainage.  Eyes: Conjunctivae and lids are normal. Pupils are equal, round, and reactive to light. No scleral icterus. Extraocular movement intact   Neck: Trachea normal and phonation normal. Neck supple. No thyromegaly present. No edema present. No erythema present. No neck rigidity present.   Cardiovascular: Normal rate, regular rhythm, S1 normal, S2 normal, normal heart sounds and normal pulses.   Pulmonary/Chest: Effort normal and breath sounds normal. No respiratory distress. She has no decreased breath sounds. She has no wheezes. She has no rhonchi. She has no rales.   Abdominal: Normal appearance and bowel sounds are normal. Soft.   Musculoskeletal: Normal range of motion.         General: No deformity. Normal range of motion.       Right lower leg: No edema.      Left lower leg: No edema.   Lymphadenopathy:     She has no cervical adenopathy.   Neurological: She is alert and oriented to person, place, and time. She exhibits normal muscle tone. Coordination normal.   Skin: Skin is warm, dry, intact, not diaphoretic and not pale.   Psychiatric: Her speech is normal and behavior is normal. Judgment and thought content normal.   Nursing note and vitals reviewed.      Assessment:     1. Dental abscess    Reports she called [facility] and was unable to be seen today patient reports she has completed 9 days of doxycycline.  Patient reports patient advised to continue with tramadol for pain 50 mg by mouth every 8 hours with 2 extra-strength Tylenol as prescribed per dentist.  Patient is to change antibiotic to Clindamycin 300 mg by mouth every 8 hours for 7 days.  Magic mouthwash has been ordered for patient for comfort.  Patient is to inform [facility] that she was seen today in urgent care and received treatment.    Plan:       Dental abscess  -     clindamycin (CLEOCIN) 300 MG capsule; Take 1 capsule (300 mg total) by mouth every 8 (eight) hours. for 7 days  Dispense: 21 capsule; Refill: 0    Other orders  -     diphenhydrAMINE-aluminum-magnesium hydroxide-simethicone-LIDOcaine viscous HCl 2%; Swish and spit 15 mLs every 6 (six) hours as needed (oral pain).  Dispense: 420 each; Refill: 0        Patient Instructions   Discharge instructions for Dental Abscess and Pain  Pt is to continue with Tramadol 50 mgs by mouth every 8 hours for pain as prescribed per dentist with Tylenol Extra strength.  Pt to Start Clindamycin as prescribed for dental abscess  Push fluids maintain hydrating  When do I need to call the doctor?   You have a fever of 100.4°F (38°C) or higher or chills.  You have swelling or pain in your neck or throat.  You are not able to open your mouth or eat.  You have trouble breathing.  You have very bad pain that is not helped by  pain medicines.  You have swelling in your gums or face.  You have discharge around the tooth.  You have a bad taste in your mouth.  You have lots of bleeding from the gums.  You have more pain after having a tooth pulled.  1) See orders for this visit as documented in the electronic medical record.  2) Symptomatic therapy suggested: use acetaminophen/ibuprofen every 6-8 hours prn pain or fever, push fluids.   3) Call or return to clinic prn if these symptoms worsen or fail to improve as anticipated.    Discussed results/diagnosis/plan with patient in clinic.  We had shared decision making for patient's treatment. Patient verbalized understanding and in agreement with current treatment plan.     Patient was instructed to return for re-evaluation with urgent care or PCP for continued outpatient workup and management if symptoms do not improve/worsening symptoms. Strict ED versus clinic precautions given in depth.    Discharge and follow-up instructions given verbally/printed with the patient who expressed understanding. The instructions and results are also available on [employer].     You must understand that you have received an Urgent Care treatment only and that you may be released before all of your medical problems are known or treated.   - You, the patient, will arrange for follow up care as instructed.   - Follow up with your PCP or specialty clinic as directed in the next 1-2 weeks if not improved or as needed.  You can call [phone] to schedule an appointment with the appropriate provider.   - If your condition worsens or fails to improve we recommend that you receive another evaluation at the ER immediately or contact your PCP to discuss your concerns or return here.        AG Mueller

## 2024-12-10 ENCOUNTER — OFFICE VISIT (OUTPATIENT)
Dept: FAMILY MEDICINE | Facility: CLINIC | Age: 54
End: 2024-12-10
Payer: MEDICAID

## 2024-12-10 VITALS
HEIGHT: 66 IN | RESPIRATION RATE: 16 BRPM | WEIGHT: 229.94 LBS | SYSTOLIC BLOOD PRESSURE: 120 MMHG | TEMPERATURE: 98 F | DIASTOLIC BLOOD PRESSURE: 68 MMHG | HEART RATE: 82 BPM | BODY MASS INDEX: 36.95 KG/M2 | OXYGEN SATURATION: 95 %

## 2024-12-10 DIAGNOSIS — I10 ESSENTIAL HYPERTENSION: ICD-10-CM

## 2024-12-10 DIAGNOSIS — N76.0 ACUTE VAGINITIS: ICD-10-CM

## 2024-12-10 DIAGNOSIS — F31.9 BIPOLAR 1 DISORDER: Chronic | ICD-10-CM

## 2024-12-10 DIAGNOSIS — Z89.512 S/P BKA (BELOW KNEE AMPUTATION) UNILATERAL, LEFT: ICD-10-CM

## 2024-12-10 DIAGNOSIS — E11.42 TYPE 2 DIABETES MELLITUS WITH DIABETIC POLYNEUROPATHY, WITH LONG-TERM CURRENT USE OF INSULIN: Primary | ICD-10-CM

## 2024-12-10 DIAGNOSIS — Z79.01 LONG TERM (CURRENT) USE OF ANTICOAGULANTS: ICD-10-CM

## 2024-12-10 DIAGNOSIS — G54.6 PHANTOM LIMB PAIN: ICD-10-CM

## 2024-12-10 DIAGNOSIS — Z79.4 TYPE 2 DIABETES MELLITUS WITH DIABETIC POLYNEUROPATHY, WITH LONG-TERM CURRENT USE OF INSULIN: Primary | ICD-10-CM

## 2024-12-10 DIAGNOSIS — Z12.31 ENCOUNTER FOR SCREENING MAMMOGRAM FOR MALIGNANT NEOPLASM OF BREAST: ICD-10-CM

## 2024-12-10 DIAGNOSIS — K21.00 GASTROESOPHAGEAL REFLUX DISEASE WITH ESOPHAGITIS WITHOUT HEMORRHAGE: ICD-10-CM

## 2024-12-10 DIAGNOSIS — Z86.718 HISTORY OF DVT (DEEP VEIN THROMBOSIS): ICD-10-CM

## 2024-12-10 PROCEDURE — 3072F LOW RISK FOR RETINOPATHY: CPT | Mod: CPTII,,, | Performed by: INTERNAL MEDICINE

## 2024-12-10 PROCEDURE — 3061F NEG MICROALBUMINURIA REV: CPT | Mod: CPTII,,, | Performed by: INTERNAL MEDICINE

## 2024-12-10 PROCEDURE — 3066F NEPHROPATHY DOC TX: CPT | Mod: CPTII,,, | Performed by: INTERNAL MEDICINE

## 2024-12-10 PROCEDURE — 99215 OFFICE O/P EST HI 40 MIN: CPT | Mod: PBBFAC,PN | Performed by: INTERNAL MEDICINE

## 2024-12-10 PROCEDURE — 3008F BODY MASS INDEX DOCD: CPT | Mod: CPTII,,, | Performed by: INTERNAL MEDICINE

## 2024-12-10 PROCEDURE — 99999 PR PBB SHADOW E&M-EST. PATIENT-LVL V: CPT | Mod: PBBFAC,,, | Performed by: INTERNAL MEDICINE

## 2024-12-10 PROCEDURE — 4010F ACE/ARB THERAPY RXD/TAKEN: CPT | Mod: CPTII,,, | Performed by: INTERNAL MEDICINE

## 2024-12-10 PROCEDURE — 1159F MED LIST DOCD IN RCRD: CPT | Mod: CPTII,,, | Performed by: INTERNAL MEDICINE

## 2024-12-10 PROCEDURE — 3046F HEMOGLOBIN A1C LEVEL >9.0%: CPT | Mod: CPTII,,, | Performed by: INTERNAL MEDICINE

## 2024-12-10 PROCEDURE — 3074F SYST BP LT 130 MM HG: CPT | Mod: CPTII,,, | Performed by: INTERNAL MEDICINE

## 2024-12-10 PROCEDURE — 99214 OFFICE O/P EST MOD 30 MIN: CPT | Mod: S$PBB,,, | Performed by: INTERNAL MEDICINE

## 2024-12-10 PROCEDURE — 1160F RVW MEDS BY RX/DR IN RCRD: CPT | Mod: CPTII,,, | Performed by: INTERNAL MEDICINE

## 2024-12-10 PROCEDURE — 3078F DIAST BP <80 MM HG: CPT | Mod: CPTII,,, | Performed by: INTERNAL MEDICINE

## 2024-12-10 RX ORDER — PANTOPRAZOLE SODIUM 40 MG/1
40 TABLET, DELAYED RELEASE ORAL DAILY
Qty: 90 TABLET | Refills: 3 | Status: SHIPPED | OUTPATIENT
Start: 2024-12-10 | End: 2025-12-10

## 2024-12-10 RX ORDER — BUMETANIDE 1 MG/1
1 TABLET ORAL DAILY
Qty: 90 TABLET | Refills: 3 | Status: SHIPPED | OUTPATIENT
Start: 2024-12-10

## 2024-12-10 RX ORDER — GABAPENTIN 300 MG/1
600 CAPSULE ORAL 3 TIMES DAILY
Qty: 180 CAPSULE | Refills: 2 | Status: SHIPPED | OUTPATIENT
Start: 2024-12-10

## 2024-12-10 RX ORDER — RISPERIDONE 3 MG/1
TABLET ORAL
Qty: 60 TABLET | Refills: 0 | Status: CANCELLED | OUTPATIENT
Start: 2024-12-10

## 2024-12-10 RX ORDER — HYDROXYZINE HYDROCHLORIDE 25 MG/1
TABLET, FILM COATED ORAL
Qty: 20 TABLET | Refills: 0 | Status: CANCELLED | OUTPATIENT
Start: 2024-12-10

## 2024-12-10 RX ORDER — METOPROLOL TARTRATE 25 MG/1
25 TABLET, FILM COATED ORAL 2 TIMES DAILY
Qty: 180 TABLET | Refills: 3 | Status: SHIPPED | OUTPATIENT
Start: 2024-12-10

## 2024-12-10 RX ORDER — FERROUS SULFATE 325(65) MG
325 TABLET, DELAYED RELEASE (ENTERIC COATED) ORAL DAILY
Qty: 30 TABLET | Refills: 0 | Status: CANCELLED | OUTPATIENT
Start: 2024-12-10

## 2024-12-10 RX ORDER — FLUCONAZOLE 150 MG/1
150 TABLET ORAL DAILY
Qty: 2 TABLET | Refills: 0 | Status: SHIPPED | OUTPATIENT
Start: 2024-12-10

## 2024-12-10 RX ORDER — MELOXICAM 7.5 MG/1
7.5 TABLET ORAL DAILY
Qty: 30 TABLET | Refills: 1 | Status: CANCELLED | OUTPATIENT
Start: 2024-12-10

## 2024-12-10 RX ORDER — ATORVASTATIN CALCIUM 40 MG/1
40 TABLET, FILM COATED ORAL DAILY
Qty: 90 TABLET | Refills: 3 | Status: SHIPPED | OUTPATIENT
Start: 2024-12-10 | End: 2025-12-10

## 2024-12-10 RX ORDER — BENZONATATE 200 MG/1
200 CAPSULE ORAL 3 TIMES DAILY PRN
Qty: 30 CAPSULE | Refills: 0 | Status: CANCELLED | OUTPATIENT
Start: 2024-12-10 | End: 2024-12-20

## 2024-12-10 RX ORDER — METFORMIN HYDROCHLORIDE 1000 MG/1
1000 TABLET ORAL 2 TIMES DAILY WITH MEALS
Qty: 180 TABLET | Refills: 1 | Status: SHIPPED | OUTPATIENT
Start: 2024-12-10

## 2024-12-10 RX ORDER — METHOCARBAMOL 500 MG/1
TABLET, FILM COATED ORAL
Qty: 45 TABLET | Refills: 1 | Status: SHIPPED | OUTPATIENT
Start: 2024-12-10

## 2024-12-10 RX ORDER — LISINOPRIL 10 MG/1
10 TABLET ORAL NIGHTLY
Qty: 90 TABLET | Refills: 2 | Status: SHIPPED | OUTPATIENT
Start: 2024-12-10

## 2024-12-10 RX ORDER — DIVALPROEX SODIUM 500 MG/1
500 TABLET, DELAYED RELEASE ORAL NIGHTLY
Qty: 30 TABLET | Refills: 11 | Status: CANCELLED | OUTPATIENT
Start: 2024-12-10 | End: 2025-12-10

## 2024-12-10 RX ORDER — CLINDAMYCIN HYDROCHLORIDE 300 MG/1
300 CAPSULE ORAL EVERY 8 HOURS
Qty: 21 CAPSULE | Refills: 0 | Status: CANCELLED | OUTPATIENT
Start: 2024-12-10 | End: 2024-12-17

## 2024-12-10 NOTE — PROGRESS NOTES
"Subjective:       Patient ID: Audrey Natarajan is a 54 y.o. female.    Chief Complaint: Follow-up (Medication refill)    F/u blood sugar    HPI: 55 y/o IDDM w/ recurrent DVT and single PE on apixiban presentsa lone for follow up. Last visit increased pre meal insulin home glucose testing is reportedly better she did not bring meter or logs to review today. No hypoglycemic symptoms has been dealing with dental issues had tooth recently removed now on oral clindamycin +vaginal itching consistent with past yeast infections no dysuria/hematuria not using left leg prosthesis due to discomfort when using       Review of Systems   Constitutional:  Negative for activity change, appetite change, fatigue, fever and unexpected weight change.   HENT:  Negative for ear pain, rhinorrhea and sore throat.    Eyes:  Negative for discharge and visual disturbance.   Respiratory:  Negative for chest tightness, shortness of breath and wheezing.    Cardiovascular:  Negative for chest pain, palpitations and leg swelling.   Gastrointestinal:  Negative for abdominal pain, constipation and diarrhea.   Endocrine: Negative for cold intolerance and heat intolerance.   Genitourinary:  Negative for dysuria and hematuria.   Musculoskeletal:  Negative for joint swelling and neck stiffness.   Skin:  Negative for rash.   Neurological:  Negative for dizziness, syncope, weakness and headaches.   Psychiatric/Behavioral:  Negative for suicidal ideas.        Objective:     Vitals:    12/10/24 0845   BP: 120/68   BP Location: Right arm   Patient Position: Sitting   Pulse: 82   Resp: 16   Temp: 98.2 °F (36.8 °C)   TempSrc: Oral   SpO2: 95%   Weight: 104.3 kg (229 lb 15 oz)   Height: 5' 6" (1.676 m)          Physical Exam  Constitutional:       Appearance: She is well-developed. She is obese.   HENT:      Head: Normocephalic and atraumatic.   Eyes:      General: No scleral icterus.     Conjunctiva/sclera: Conjunctivae normal.   Cardiovascular:      Rate " and Rhythm: Normal rate and regular rhythm.      Heart sounds: No murmur heard.     No friction rub. No gallop.   Pulmonary:      Effort: Pulmonary effort is normal. No respiratory distress.      Breath sounds: Normal breath sounds. No wheezing or rales.   Abdominal:      General: There is no distension.      Palpations: Abdomen is soft.   Musculoskeletal:         General: No tenderness. Normal range of motion.      Cervical back: Normal range of motion.   Skin:     General: Skin is warm and dry.   Neurological:      Mental Status: She is alert and oriented to person, place, and time.      Cranial Nerves: No cranial nerve deficit.         Assessment and Plan   1. Type 2 diabetes mellitus with diabetic polyneuropathy, with long-term current use of insulin (Primary)  Continue metformin statin and acei labs prior to return in two montsh  - atorvastatin (LIPITOR) 40 MG tablet; Take 1 tablet (40 mg total) by mouth once daily.  Dispense: 90 tablet; Refill: 3  - lisinopriL 10 MG tablet; Take 1 tablet (10 mg total) by mouth every evening.  Dispense: 90 tablet; Refill: 2  - metFORMIN (GLUCOPHAGE) 1000 MG tablet; Take 1 tablet (1,000 mg total) by mouth 2 (two) times daily with meals.  Dispense: 180 tablet; Refill: 1  - CBC Without Differential; Future  - Comprehensive Metabolic Panel; Future  - Hemoglobin A1C; Future  - semaglutide (OZEMPIC) 2 mg/dose (8 mg/3 mL) PnIj; Inject 2 mg into the skin every 7 days.  Dispense: 9 mL; Refill: 1    2. Essential hypertension  Bp at goal continue current medicaitons  - bumetanide (BUMEX) 1 MG tablet; Take 1 tablet (1 mg total) by mouth once daily.  Dispense: 90 tablet; Refill: 3  - lisinopriL 10 MG tablet; Take 1 tablet (10 mg total) by mouth every evening.  Dispense: 90 tablet; Refill: 2  - metoprolol tartrate (LOPRESSOR) 25 MG tablet; Take 1 tablet (25 mg total) by mouth 2 (two) times daily.  Dispense: 180 tablet; Refill: 3    3. History of DVT (deep vein thrombosis)  Life long  anticoagulation with apiximab  - apixaban (ELIQUIS) 5 mg Tab; Take 1 tablet (5 mg total) by mouth 2 (two) times daily.  Dispense: 60 tablet; Refill: 2    4. Acute vaginitis  Prn fluxconazole  - fluconazole (DIFLUCAN) 150 MG Tab; Take 1 tablet (150 mg total) by mouth once daily. May take second tab po two days after first if symptoms persist  Dispense: 2 tablet; Refill: 0    5. Phantom limb pain  Gabpaentin TID  - gabapentin (NEURONTIN) 300 MG capsule; Take 2 capsules (600 mg total) by mouth 3 (three) times daily.  Dispense: 180 capsule; Refill: 2    6. S/P BKA (below knee amputation) unilateral, left  As abvoe   - methocarbamoL (ROBAXIN) 500 MG Tab; TAKE 1 TABLET(500 MG) BY MOUTH THREE TIMES DAILY as needed for back pain  Dispense: 45 tablet; Refill: 1    7. Gastroesophageal reflux disease with esophagitis without hemorrhage  Ppi prn  - pantoprazole (PROTONIX) 40 MG tablet; Take 1 tablet (40 mg total) by mouth once daily.  Dispense: 90 tablet; Refill: 3    8. Encounter for screening mammogram for malignant neoplasm of breast  Mammogram due Jan 2025  - Mammo Digital Screening Bilat w/ Phil; Future    9. Long term (current) use of anticoagulants  Stable contineu apixaban  - apixaban (ELIQUIS) 5 mg Tab; Take 1 tablet (5 mg total) by mouth 2 (two) times daily.  Dispense: 60 tablet; Refill: 2    10. Bipolar 1 disorder  Followed by psychiatry Dr. Labadie symptosm well controlled with current medicaitons no adjustments made today

## 2024-12-15 DIAGNOSIS — Z79.4 TYPE 2 DIABETES MELLITUS WITH DIABETIC POLYNEUROPATHY, WITH LONG-TERM CURRENT USE OF INSULIN: ICD-10-CM

## 2024-12-15 DIAGNOSIS — E11.42 TYPE 2 DIABETES MELLITUS WITH DIABETIC POLYNEUROPATHY, WITH LONG-TERM CURRENT USE OF INSULIN: ICD-10-CM

## 2024-12-15 NOTE — TELEPHONE ENCOUNTER
No care due was identified.  [facility] Embedded Care Due Messages. Reference number: [id number].   12/15/2024 3:46:52 PM CST   Cervical insufficiency with cerclage/Multiple Gestation/Abnormal Amniotic Fluid Volume/Other

## 2024-12-17 DIAGNOSIS — Z89.512 S/P BKA (BELOW KNEE AMPUTATION) UNILATERAL, LEFT: ICD-10-CM

## 2024-12-17 DIAGNOSIS — G54.6 PHANTOM LIMB PAIN: ICD-10-CM

## 2024-12-17 RX ORDER — TRAMADOL HYDROCHLORIDE 50 MG/1
50 TABLET ORAL EVERY 8 HOURS PRN
Qty: 60 TABLET | Refills: 0 | Status: SHIPPED | OUTPATIENT
Start: 2024-12-17

## 2024-12-17 RX ORDER — INSULIN ASPART 100 [IU]/ML
10 INJECTION, SOLUTION INTRAVENOUS; SUBCUTANEOUS 3 TIMES DAILY
Qty: 9 ML | Refills: 1 | Status: SHIPPED | OUTPATIENT
Start: 2024-12-17

## 2024-12-17 NOTE — TELEPHONE ENCOUNTER
No care due was identified.  [facility] Embedded Care Due Messages. Reference number: [id number].   12/17/2024 9:08:05 AM CST

## 2024-12-17 NOTE — TELEPHONE ENCOUNTER
Refill Routing Note   Medication(s) are not appropriate for processing by [facility] for the following reason(s):        New or recently adjusted medication    [facility] action(s):  Defer               Appointments  past 12m or future 3m with PCP    Date Provider   Last Visit   12/10/2024 Donaldo Pena MD   Next Visit   3/25/2025 Donaldo Pena MD   ED visits in past 90 days: 0        Note composed:6:36 PM 12/16/2024

## 2025-01-03 ENCOUNTER — TELEPHONE (OUTPATIENT)
Dept: FAMILY MEDICINE | Facility: CLINIC | Age: 55
End: 2025-01-03
Payer: MEDICAID

## 2025-01-03 DIAGNOSIS — Z89.512 S/P BKA (BELOW KNEE AMPUTATION) UNILATERAL, LEFT: ICD-10-CM

## 2025-01-03 RX ORDER — METHOCARBAMOL 500 MG/1
TABLET, FILM COATED ORAL
Qty: 45 TABLET | Refills: 1 | Status: SHIPPED | OUTPATIENT
Start: 2025-01-03

## 2025-01-03 NOTE — TELEPHONE ENCOUNTER
----- Message from Marybeth sent at 1/3/2025  3:12 PM CST -----  Regarding: self  Type: RX Refill Request     Who Called:     Have you contacted your pharmacy:     Refill     RX Name and Strength:methocarbamoL (ROBAXIN) 500 MG Tab     Preferred Pharmacy with phone number:   [id number] - [address] AT [address]  [place] [address]  Phone: [phone] Fax: [phone]             Local or Mail Order:local     Would the patient rather a call back or a response via [facility]?call     Best Call Back Number: [phone]       Additional Information:     Thank you.

## 2025-01-07 ENCOUNTER — OFFICE VISIT (OUTPATIENT)
Dept: PULMONOLOGY | Facility: CLINIC | Age: 55
End: 2025-01-07
Payer: MEDICAID

## 2025-01-07 VITALS
OXYGEN SATURATION: 93 % | HEART RATE: 80 BPM | BODY MASS INDEX: 37.11 KG/M2 | DIASTOLIC BLOOD PRESSURE: 62 MMHG | HEIGHT: 66 IN | SYSTOLIC BLOOD PRESSURE: 100 MMHG

## 2025-01-07 DIAGNOSIS — G47.00 INSOMNIA, UNSPECIFIED TYPE: ICD-10-CM

## 2025-01-07 DIAGNOSIS — G47.33 OSA (OBSTRUCTIVE SLEEP APNEA): Primary | ICD-10-CM

## 2025-01-07 PROCEDURE — 99999 PR PBB SHADOW E&M-EST. PATIENT-LVL V: CPT | Mod: PBBFAC,,, | Performed by: INTERNAL MEDICINE

## 2025-01-07 PROCEDURE — 99214 OFFICE O/P EST MOD 30 MIN: CPT | Mod: S$PBB,,, | Performed by: INTERNAL MEDICINE

## 2025-01-07 PROCEDURE — 99215 OFFICE O/P EST HI 40 MIN: CPT | Mod: PBBFAC | Performed by: INTERNAL MEDICINE

## 2025-01-07 PROCEDURE — 3008F BODY MASS INDEX DOCD: CPT | Mod: CPTII,,, | Performed by: INTERNAL MEDICINE

## 2025-01-07 PROCEDURE — 1159F MED LIST DOCD IN RCRD: CPT | Mod: CPTII,,, | Performed by: INTERNAL MEDICINE

## 2025-01-07 PROCEDURE — 3078F DIAST BP <80 MM HG: CPT | Mod: CPTII,,, | Performed by: INTERNAL MEDICINE

## 2025-01-07 PROCEDURE — G2211 COMPLEX E/M VISIT ADD ON: HCPCS | Mod: S$PBB,,, | Performed by: INTERNAL MEDICINE

## 2025-01-07 PROCEDURE — 3074F SYST BP LT 130 MM HG: CPT | Mod: CPTII,,, | Performed by: INTERNAL MEDICINE
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
## 2025-01-10 NOTE — PROGRESS NOTES
"[facility]                Progress Note    Subjective:       Patient ID: Audrey Ntaarajan is a 52 y.o. female.    Chief Complaint: No chief complaint on file.    To clinic in w/c accompanied by friend. Has not braught wound vac supplies. Football that was covering wound vac was saturated with oderless serous drainage.Wound bright pink and 3.5,3.8 and 1cm bigger in length width and depth States alarm of wound vac beeping frequently. Wound vac off plantar foot completely with massive maceration to whole plantar foot. Pt had not called  regarding alarm sounding on Vac enc to do so if happens again. After 35 min maceration remains despite drying foot well. Football will be placed with alginate, Drawtex and mextra. Return to  MW except days at clinic Next appointment 3/17. Pt verbalized wish to have leg amputated when asked why she refused in the hospital she said that "family had talked her out of it" Refused AVS    Review of Systems   Constitutional: Negative.    HENT: Negative.     Eyes: Negative.    Respiratory: Negative.     Cardiovascular: Negative.    Gastrointestinal: Negative.    Endocrine: Negative.    Musculoskeletal: Negative.    Skin:  Positive for wound.   All other systems reviewed and are negative.      Objective:        Physical Exam  Vitals reviewed.   Constitutional:       Appearance: She is well-developed.   HENT:      Head: Normocephalic and atraumatic.   Eyes:      Extraocular Movements: Extraocular movements intact.      Conjunctiva/sclera: Conjunctivae normal.      Pupils: Pupils are equal, round, and reactive to light.   Skin:     General: Skin is warm and dry.      Comments: See wound description for further information   Neurological:      Mental Status: She is alert and oriented to person, place, and time.   Psychiatric:         Mood and Affect: Mood normal.         Behavior: Behavior normal.       Vitals:    03/09/23 1404   BP: (!) " 104/50   Pulse: 97   Temp: 97.9 °F (36.6 °C)       Assessment:           ICD-10-CM ICD-9-CM   1. Open wound of left foot  S91.302A 892.0   2. Open wound of left foot, subsequent encounter  S91.302D V58.89     892.0   3. Left midfoot ulcer, with necrosis of muscle  L97.423 707.14     728.89            (Retired) Wound 04/15/22 2230 Diabetic Ulcer Left medial;plantar Foot (Active)   04/15/22 2230    Pre-existing: Yes   Primary Wound Type: Diabetic ulc   Side: Left   Orientation: medial;plantar   Location: Foot   Wound Number:    Ankle-Brachial Index:    Pulses:    Removal Indication and Assessment: Other (see comments)   Wound Outcome:    (Retired) Wound Type:    (Retired) Wound Length (cm):    (Retired) Wound Width (cm):    (Retired) Depth (cm):    Wound Description (Comments):    Removal Indications:    Wound Image   03/09/23 1448   Wound WDL ex 03/09/23 1448   Dressing Appearance Moist drainage;Saturated 03/09/23 1448   Drainage Amount Copious 03/09/23 1448   Drainage Characteristics/Odor Serous;No odor 03/09/23 1448   Appearance Pink 03/09/23 1448   Tissue loss description Full thickness 03/09/23 1448   Red (%), Wound Tissue Color 100 % 03/09/23 1448   Periwound Area Intact;Dry 03/09/23 1448   Wound Edges Defined 03/09/23 1448   Wound Length (cm) 6 cm 03/09/23 1448   Wound Width (cm) 6 cm 03/09/23 1448   Wound Depth (cm) 1.2 cm 03/09/23 1448   Wound Volume (cm^3) 43.2 cm^3 03/09/23 1448   Wound Surface Area (cm^2) 36 cm^2 03/09/23 1448   Care Cleansed with:;Antimicrobial agent;Sterile normal saline 03/09/23 1448   Dressing Changed 03/09/23 1448   Off Loading Football dressing;Off loading shoe 03/09/23 1448   Dressing Change Due 03/17/23 03/09/23 1448       [REMOVED]      Incision/Site 02/28/23 1433 Left Foot (Removed)   02/28/23 1433   Present Prior to Hospital Arrival?:    Side: Left   Location: Foot   Orientation:    Incision Type:    Closure Method:    Additional Comments:    Removal Indication and Assessment:     Wound Outcome: Converged   Removal Indications:    Removed 03/09/23 1447   Dressing Change Due 03/17/23 03/09/23 1439           Plan:          Debridement not needed and Done today.   Continue off-loading / leg elevation   Continue eating protein   Keep dressing clean and intact  Continue with wound care orders and plan as noted in orders.   Continue to follow current medication regimen as per pcp   Call for any questions / concerns.       Tissue pathology and/or culture taken     [] Yes      [x] No  Sharp debridement performed                   [] Yes       [x] No  Labs ordered     [] Yes       [x] No  Imaging ordered    [] Yes      [x] No    Orders Placed This Encounter   Procedures    Ambulatory referral/consult to Wound Clinic     Standing Status:   Standing     Number of Occurrences:   1     Referral Priority:   Routine     Referral Type:   Consultation     Referral Reason:   Specialty Services Required     Requested Specialty:   Wound Care     Number of Visits Requested:   1    Change dressing     Gentian violet to plantar foot  Aquacel extra 4 x 5 folded  Drawtex cut to fit stacked   5 x 9 Mextra across wound   Three cast padding football with coban, Darco    SUBSEQUENT HOME HEALTH ORDERS     HH M W F except day comes to clinic due 17th March  Gentian violet to plantar foot   Aquacel extra 4 x 5 folded   Drawtex cut to fit stacked   5 x 9 Mextra across wound   Three cast padding football with coban, Darco     Order Specific Question:   What Home Health Agency is the patient currently using?     Answer:   [facility]        Follow up in about 8 days (around 3/17/2023).        Detail Level: Detailed Depth Of Biopsy: dermis Was A Bandage Applied: Yes Size Of Lesion In Cm: 0.5 X Size Of Lesion In Cm: 0 Biopsy Type: H and E Biopsy Method: Dermablade Anesthesia Type: 0.5% lidocaine without epinephrine Anesthesia Volume In Cc: 1.4 Hemostasis: Drysol Wound Care: Petrolatum Dressing: bandage Destruction After The Procedure: No Type Of Destruction Used: Curettage Curettage Text: The wound bed was treated with curettage after the biopsy was performed. Cryotherapy Text: The wound bed was treated with cryotherapy after the biopsy was performed. Electrodesiccation Text: The wound bed was treated with electrodesiccation after the biopsy was performed. Electrodesiccation And Curettage Text: The wound bed was treated with electrodesiccation and curettage after the biopsy was performed. Silver Nitrate Text: The wound bed was treated with silver nitrate after the biopsy was performed. Lab: [id number] Lab Facility: [id number] Medical Necessity Information: It is in your best interest to select a reason for this procedure from the list below. All of these items fulfill various CMS LCD requirements except the new and changing color options. Consent: Written consent was obtained and risks were reviewed including but not limited to scarring, infection, bleeding, scabbing, incomplete removal, nerve damage and allergy to anesthesia. Post-Care Instructions: I reviewed with the patient in detail post-care instructions. Patient is to keep the biopsy site dry overnight, and then apply bacitracin twice daily until healed. Patient may apply hydrogen peroxide soaks to remove any crusting. Notification Instructions: Patient will be notified of biopsy results. However, patient instructed to call the office if not contacted within 2 weeks. Billing Type: Third-Party Bill Information: Selecting Yes will display possible errors in your note based on the variables you have selected. This validation is only offered as a suggestion for you. PLEASE NOTE THAT THE VALIDATION TEXT WILL BE REMOVED WHEN YOU FINALIZE YOUR NOTE. IF YOU WANT TO FAX A PRELIMINARY NOTE YOU WILL NEED TO TOGGLE THIS TO 'NO' IF YOU DO NOT WANT IT IN YOUR FAXED NOTE.

## 2025-01-12 ENCOUNTER — OFFICE VISIT (OUTPATIENT)
Dept: URGENT CARE | Facility: CLINIC | Age: 55
End: 2025-01-12
Payer: MEDICAID

## 2025-01-12 VITALS
HEART RATE: 92 BPM | OXYGEN SATURATION: 91 % | TEMPERATURE: 99 F | BODY MASS INDEX: 36.8 KG/M2 | RESPIRATION RATE: 18 BRPM | DIASTOLIC BLOOD PRESSURE: 79 MMHG | SYSTOLIC BLOOD PRESSURE: 138 MMHG | HEIGHT: 66 IN | WEIGHT: 229 LBS

## 2025-01-12 DIAGNOSIS — L02.91 ABSCESS: Primary | ICD-10-CM

## 2025-01-12 DIAGNOSIS — L01.00 IMPETIGO: ICD-10-CM

## 2025-01-12 PROCEDURE — 99213 OFFICE O/P EST LOW 20 MIN: CPT | Mod: 25,S$GLB,, | Performed by: FAMILY MEDICINE

## 2025-01-12 PROCEDURE — 56405 I&D VULVA/PERINEAL ABSCESS: CPT | Mod: S$GLB,,, | Performed by: FAMILY MEDICINE

## 2025-01-12 RX ORDER — MUPIROCIN 20 MG/G
OINTMENT TOPICAL 3 TIMES DAILY
Qty: 30 G | Refills: 0 | Status: SHIPPED | OUTPATIENT
Start: 2025-01-12

## 2025-01-12 RX ORDER — CLINDAMYCIN HYDROCHLORIDE 150 MG/1
450 CAPSULE ORAL EVERY 8 HOURS
Qty: 63 CAPSULE | Refills: 0 | Status: SHIPPED | OUTPATIENT
Start: 2025-01-12 | End: 2025-01-19

## 2025-01-12 NOTE — PROGRESS NOTES
"Subjective:      Patient ID: Audrey Natarajan is a 54 y.o. female.    Vitals:  height is 5' 6" (1.676 m) and weight is 103.9 kg (229 lb). Her oral temperature is 99.1 °F (37.3 °C). Her blood pressure is 138/79 and her pulse is 92. Her respiration is 18 and oxygen saturation is 91% (abnormal).     Chief Complaint: Abscess    Pt states that she is having a abscess on her vagina and her nose that started wed for the groin abscess, and Thursday for the  her right nostril.  She denies any fever or chills.  She says she has a history of the same abscess on the same side for the groin abscess 10 years ago, she does take anticoagulants, she has been trying topical mupirocin to her nose.  She reports tolerating clindamycin in the past.  She reports oxygen level 91% which is baseline for her. Denies cp or sob.    Abscess  Chronicity:  NewProgression Since Onset: unchanged  Location:  Face (vagina)  Associated Symptoms: no fever, no chills  Characteristics: painful, redness and swelling    Characteristics: not draining, no itching, no dryness, no scaling, no peeling, no bruising and no blistering    Pain Scale:  0/10  Treatments Tried:  Topical antibiotics      Constitution: Negative for activity change, appetite change, chills, sweating, fatigue and fever.   Respiratory:  Negative for chest tightness and shortness of breath.    Skin:  Positive for abscess.      Objective:     Physical Exam   Constitutional: She is oriented to person, place, and time. She appears well-developed.  Non-toxic appearance. She does not appear ill. No distress.      Comments:Family present   Pt in wheelchair   obesity  HENT:   Head: Normocephalic and atraumatic.   Ears:   Right Ear: External ear normal.   Left Ear: External ear normal.   Nose: Sinus tenderness (right nare swelling, some honey crusting noted with mild swelling.) present. No nasal deformity.       Mouth/Throat: Oropharynx is clear and moist.   Eyes: Conjunctivae, EOM and lids are " "normal. Pupils are equal, round, and reactive to light.   Neck: Trachea normal and phonation normal. Neck supple.   Genitourinary:               Comments: 2cm abscess with tenderness and induration, erythematous.     Musculoskeletal: Normal range of motion.         General: Normal range of motion.      Comments: Left bka   Neurological: She is alert and oriented to person, place, and time.   Skin: Skin is warm, dry, intact and not diaphoretic.   Psychiatric: Her speech is normal and behavior is normal. Judgment and thought content normal.   Nursing note and vitals reviewed.chaperone present (ángel vieira)         Assessment:     1. Abscess    2. Impetigo      Incision & Drainage     Date/Time: 1/12/2025 4:45 PM     Performed by: Tennille Garvin NP  Authorized by: Tennille Garvin NP    Time out: Immediately prior to procedure a "time out" was called to verify the correct patient, procedure, equipment, support staff and site/side marked as required.     Consent Done?:  Yes (Verbal)     Type:  Abscess  Body area:  Anogenital  Location details:  Vulva  Local anesthetic: Lidocaine 2% without epinephrine  Anesthetic total (ml):  1  Scalpel size:  11  Incision type:  Single straight  Complexity:  Simple  Drainage:  Bloody  Drainage amount:  Scant  Wound treatment:  Incision, drainage and wound left open  Patient tolerance:  Patient tolerated the procedure well with no immediate complications     Plan:     Encouraged warm compresses throughout the day, continue with wound care topical mupirocin and clindamycin. Tylenol prn pain    Discussed results/diagnosis/plan with patient in clinic. Strict precautions given to patient to monitor for worsening signs and symptoms. Advised to follow up with PCP or specialist.    Explained side effects of medications prescribed with patient and informed him/her to discontinue use if he/she has any side effects and to inform UC or PCP if this occurs. All questions answered. Strict ED " [facility] clinic return precautions stressed and given in depth. Advised if symptoms worsens of fail to improve he/she should go to the Emergency Room. Discharge and follow-up instructions given verbally/printed with the patient who expressed understanding and willingness to comply with my recommendations. Patient voiced understanding and in agreement with current treatment plan. Patient exits the exam room in no acute distress. Conversant and engaged during discharge discussion, verbalized understanding.      Abscess  -     clindamycin (CLEOCIN) 150 MG capsule; Take 3 capsules (450 mg total) by mouth every 8 (eight) hours. for 7 days  Dispense: 63 capsule; Refill: 0  -     mupirocin (BACTROBAN) 2 % ointment; Apply topically 3 (three) times daily. Right nostril  Dispense: 30 g; Refill: 0  -     Incision & Drainage    Impetigo  -     clindamycin (CLEOCIN) 150 MG capsule; Take 3 capsules (450 mg total) by mouth every 8 (eight) hours. for 7 days  Dispense: 63 capsule; Refill: 0  -     mupirocin (BACTROBAN) 2 % ointment; Apply topically 3 (three) times daily. Right nostril  Dispense: 30 g; Refill: 0

## 2025-01-12 NOTE — PATIENT INSTRUCTIONS
Please follow-up with your primary care provider in 3-4 days for skin check and re-evaluation.    General Discharge Instructions   PLEASE READ YOUR DISCHARGE INSTRUCTIONS ENTIRELY AS IT CONTAINS IMPORTANT INFORMATION.  If you were prescribed a narcotic or controlled medication, do not drive or operate heavy equipment or machinery while taking these medications.  If you were prescribed antibiotics, please take them to completion.  You must understand that you've received an Urgent Care treatment only and that you may be released before all your medical problems are known or treated. You, the patient, will arrange for follow up care as instructed.    OVER THE COUNTER RECOMMENDATIONS/SUGGESTIONS.    Make sure to stay well hydrated.    Use Nasal Saline to mechanically move any post nasal drip from your eustachian tube or from the back of your throat.    Use warm salt water gargles to ease your throat pain. Warm salt water gargles as needed for sore throat- 1/2 tsp salt to 1 cup warm water, gargle as desired.    Use an antihistamine such as Claritin, Zyrtec or Allegra to dry you out.    Use pseudoephedrine (behind the counter) to decongest. Pseudoephedrine 30 mg up to 240 mg /day. It can raise your blood pressure and give you palpitations.    Use mucinex (guaifenesin) to break up mucous up to 2400mg/day to loosen any mucous.    The mucinex DM pill has a cough suppressant that can be sedating. It can be used at night to stop the tickle at the back of your throat.    You can use Mucinex D (it has guaifenesin and a high dose of pseudoephedrine) in the mornings to help decongest.    Use Afrin in each nare for no longer than 3 days, as it is addictive. It can also dry out your mucous membranes and cause elevated blood pressure. This is especially useful if you are flying.    Use Flonase 1-2 sprays/nostril per day. It is a local acting steroid nasal spray, if you develop a bloody nose, stop using the medication  immediately.    Sometimes Nyquil at night is beneficial to help you get some rest, however it is sedating and it does have an antihistamine, and tylenol.    Honey is a natural cough suppressant that can be used.    Tylenol up to 4,000 mg a day is safe for short periods and can be used for body aches, pain, and fever. However in high doses and prolonged use it can cause liver irritation.    Ibuprofen is a non-steroidal anti-inflammatory that can be used for body aches, pain, and fever.However it can also cause stomach irritation if over used.     Follow up with your PCP or specialty clinic as instructed in the next 2-3 days if not improved or as needed. You can call [phone] to schedule an appointment with appropriate provider.      If you condition worsens, we recommend that you receive another evaluation at the emergency room immediately or contact your primary medical clinic's after hours call service to discuss your concerns.      Please return here or go to the Emergency Department for any concerns or worsening condition.   You can also call [phone] to schedule an appointment with the appropriate provider.    Please return here or go to the Emergency Department for any concerns or worsening of condition.    Thank you for choosing [facility]!    Our goal in the Urgent Care is to always provide outstanding medical care. You may receive a survey by mail or e-mail in the next week regarding your experience today. We would greatly appreciate you completing and returning the survey. Your feedback provides us with a way to recognize our staff who provide very good care, and it helps us learn how to improve when your experience was below our aspiration of excellence.      We appreciate you trusting us with your medical care. We hope you feel better soon. We will be happy to take care of you for all of your future medical needs.    Sincerely,    SHAHRZAD Sherwood                                              Abscess/Cellulitis   If your condition worsens or fails to improve we recommend that you receive another evaluation at the ER immediately or contact your PCP to discuss your concerns or return here. You must understand that you've received an urgent care treatment only and that you may be released before all your medical problems are known or treated. You the patient will arrange for follouwp care as instructed.     Soak affected in warm water with epsom salts several times a day. Dilute 2 cups per gallon of water. If you cannot soak  apply warm compresses to the affected area for 20 min, 3-5 times per day and apply warm compresses frequently. If you cannot soak, use the shower head.  Use warm compresses for 20 minutes 3-5 times a day. Avoid picking or manipulating the wound. Clean the wound twice a day and put the antibiotic ointment on it. You should seek ER treatment if fever, increase pain, swelling or other signs of increasing infection.   If you were prescribed antibiotics, please take them to completion  If you are female and on BCP use additional methods to prevent pregnancy while on antibiotics and for one cycle after.   If you were given narcotics do not drive or operate heavy equipment or machinery while taking these medications.   Tylenol or ibuprofen can also be used as directed for pain unless you have an allergy to them or medical condition such as stomach ulcers, kidney or liver disease or blood thinners etc for which you should not be taking these type of medications.   Return in 48- 72 hours for recheck.     SKIN Infections  Please return here or go to the Emergency Department for any concerns or worsening of condition.   Take meds as prescribed for your infection.    Keep area cleaned and dry; cover in public places  Apply topical ointment aas directed to the affected area.   Follow up with your PCP in the next 2-3 days if no improvement   If you develop an increase in redness, swelling,  tenderness, drainage, fever, nausea/vomiting, ect., go to the ER.

## 2025-01-12 NOTE — PROCEDURES
"Incision & Drainage    Date/Time: 1/12/2025 4:45 PM    Performed by: Tennille Garvin NP  Authorized by: Tennille Garvin NP    Time out: Immediately prior to procedure a "time out" was called to verify the correct patient, procedure, equipment, support staff and site/side marked as required.    Consent Done?:  Yes (Verbal)    Type:  Abscess  Body area:  Anogenital  Location details:  Vulva  Local anesthetic: Lidocaine 2% without epinephrine  Anesthetic total (ml):  1  Scalpel size:  11  Incision type:  Single straight  Complexity:  Simple  Drainage:  Bloody  Drainage amount:  Scant  Wound treatment:  Incision, drainage and wound left open  Patient tolerance:  Patient tolerated the procedure well with no immediate complications  Pain Assessment: 0  "

## 2025-01-16 DIAGNOSIS — Z89.512 S/P BKA (BELOW KNEE AMPUTATION) UNILATERAL, LEFT: ICD-10-CM

## 2025-01-16 DIAGNOSIS — G54.6 PHANTOM LIMB PAIN: ICD-10-CM

## 2025-01-16 RX ORDER — TRAMADOL HYDROCHLORIDE 50 MG/1
50 TABLET ORAL EVERY 8 HOURS PRN
Qty: 60 TABLET | Refills: 0 | Status: SHIPPED | OUTPATIENT
Start: 2025-01-16

## 2025-01-16 NOTE — TELEPHONE ENCOUNTER
----- Message from Scott sent at 1/16/2025  4:04 PM CST -----  Regarding: SELF  Type: RX Refill Request    Who called:SELF  Have you contacted your pharmacy:NO  Refill or New Rx:REFILL  RX name and strength:traMADol (ULTRAM) 50 mg tablet    Preferred pharmacy and phone number:  [id number] [address] [address]  Phone: [phone] Fax: [phone]    Ordering provider:VLAD  Would the patient rather a call back or a response via [facility]:CALL  Best call back number:[phone]    Additional information:

## 2025-01-16 NOTE — TELEPHONE ENCOUNTER
Care Due:                  Date            Visit Type   Department     Provider  --------------------------------------------------------------------------------                                [facility]                              PRIMARY      MEDICINE/  Last Visit: 12-      CARE ([facility])   INTERNAL MED   Donaldo Pena                              [facility]                              PRIMARY      MEDICINE/  Next Visit: 03-      CARE ([facility])   INTERNAL MED   Donaldo Pena                                                            Last  Test          Frequency    Reason                     Performed    Due Date  --------------------------------------------------------------------------------    HBA1C.......  6 months...  insulin, metFORMIN,        10-   04-                             semaglutide..............    [employer] Embedded Care Due Messages. Reference number: [id number].   1/16/2025 4:20:17 PM CST

## 2025-01-27 ENCOUNTER — HOSPITAL ENCOUNTER (OUTPATIENT)
Dept: SLEEP MEDICINE | Facility: HOSPITAL | Age: 55
Discharge: HOME | End: 2025-01-27
Attending: INTERNAL MEDICINE
Payer: MEDICAID

## 2025-01-27 DIAGNOSIS — G47.33 OSA (OBSTRUCTIVE SLEEP APNEA): ICD-10-CM

## 2025-01-27 PROCEDURE — 95800 SLP STDY UNATTENDED: CPT

## 2025-01-27 NOTE — PROGRESS NOTES
The patient ID was verified. She was instructed on how to turn the Home Sleep Testing device on and off, how to apply the sensors. She was encouraged to sleep on supine position and must have 6 hours of sleep. The patient was instructed not to get the device wet and return it back to us. All questions were answered prior to patient leaving. She was provided the after visit summary.   She was tested before, however never got the PAP machine.

## 2025-01-28 NOTE — PROGRESS NOTES
The HSAT device was received and uploaded this morning. The recorded sleep data noted to be less than one hour and the patient did not report issue with the device during the study. When I called the patient, she reported wearing the device 7 hours or more did not gave any error message. She is a side sleeper. She will repeat the study this Friday.

## 2025-01-30 ENCOUNTER — OFFICE VISIT (OUTPATIENT)
Dept: PODIATRY | Facility: CLINIC | Age: 55
End: 2025-01-30
Payer: MEDICAID

## 2025-01-30 VITALS — HEIGHT: 66 IN | WEIGHT: 229.06 LBS | BODY MASS INDEX: 36.81 KG/M2

## 2025-01-30 DIAGNOSIS — Z89.512 S/P UNILATERAL BKA (BELOW KNEE AMPUTATION), LEFT: ICD-10-CM

## 2025-01-30 DIAGNOSIS — L60.0 INGROWN NAIL: ICD-10-CM

## 2025-01-30 DIAGNOSIS — M20.40 HAMMER TOE, UNSPECIFIED LATERALITY: ICD-10-CM

## 2025-01-30 DIAGNOSIS — E11.49 TYPE II DIABETES MELLITUS WITH NEUROLOGICAL MANIFESTATIONS: Primary | ICD-10-CM

## 2025-01-30 DIAGNOSIS — Z79.4 TYPE 2 DIABETES MELLITUS WITH DIABETIC POLYNEUROPATHY, WITH LONG-TERM CURRENT USE OF INSULIN: ICD-10-CM

## 2025-01-30 DIAGNOSIS — E11.42 TYPE 2 DIABETES MELLITUS WITH DIABETIC POLYNEUROPATHY, WITH LONG-TERM CURRENT USE OF INSULIN: ICD-10-CM

## 2025-01-30 PROCEDURE — 99999 PR PBB SHADOW E&M-EST. PATIENT-LVL IV: CPT | Mod: PBBFAC,,, | Performed by: PODIATRIST

## 2025-01-30 PROCEDURE — 99214 OFFICE O/P EST MOD 30 MIN: CPT | Mod: PBBFAC,PO | Performed by: PODIATRIST

## 2025-01-30 RX ORDER — INSULIN ASPART 100 [IU]/ML
INJECTION, SOLUTION INTRAVENOUS; SUBCUTANEOUS
Qty: 9 ML | Refills: 1 | Status: SHIPPED | OUTPATIENT
Start: 2025-01-30

## 2025-01-30 NOTE — PROGRESS NOTES
Subjective:     Patient ID: Audrey Natarajan is a 54 y.o. female.    Chief Complaint: Diabetes Mellitus, Diabetic Foot Exam (12/10/24 Dr Pena), and Nail Care    Audrey is a 54 y.o. female who presents to the clinic for evaluation and treatment of high risk feet. Audrey has a past medical history of ADHD (attention deficit hyperactivity disorder), Arthritis, Asthma, Bipolar 1 disorder, Cataract, Cigarette smoker, COPD (chronic obstructive pulmonary disease), Coronary artery disease, Depression, Diabetes mellitus, Diabetic foot ulcers, Diabetic neuropathy, DVT of lower extremity, bilateral (07/2013), Encounter for blood transfusion, History of blood clots (1. Left Leg=2003; 2.Bilateral Groin=Blood Clots= 5 or 6/ 2013 & 7/2013; 3. LLL of Lung=7/2013;  4. Lt. Lower Leg=7/2013. ), HTN (hypertension) (06/06/2013), Hypercholesteremia, Irregular heartbeat, Neuromuscular disorder, Obese, PE (pulmonary embolism) (07/2013), and Restless leg syndrome. The patient's chief complaint is long, thick toenails. This patient has documented high risk feet requiring routine maintenance secondary to peripheral neuropathy.    PCP: Donaldo Pnea MD    Date Last Seen by PCP: per above    Current shoe gear:  Affected Foot: Tennis shoes     Unaffected Foot: Tennis shoes    Hemoglobin A1C   Date Value Ref Range Status   10/11/2024 13.3 (H) 4.0 - 5.6 % Final     Comment:     ADA Screening Guidelines:  5.7-6.4%  Consistent with prediabetes  >or=6.5%  Consistent with diabetes    High levels of fetal hemoglobin interfere with the HbA1C  assay. Heterozygous hemoglobin variants (HbS, HgC, etc)do  not significantly interfere with this assay.   However, presence of multiple variants may affect accuracy.     04/22/2024 11.0 (H) 4.0 - 5.6 % Final     Comment:     ADA Screening Guidelines:  5.7-6.4%  Consistent with prediabetes  >or=6.5%  Consistent with diabetes    High levels of fetal hemoglobin interfere with the HbA1C  assay. Heterozygous  hemoglobin variants (HbS, HgC, etc)do  not significantly interfere with this assay.   However, presence of multiple variants may affect accuracy.     01/19/2024 12.1 (H) 4.0 - 5.6 % Final     Comment:     ADA Screening Guidelines:  5.7-6.4%  Consistent with prediabetes  >or=6.5%  Consistent with diabetes    High levels of fetal hemoglobin interfere with the HbA1C  assay. Heterozygous hemoglobin variants (HbS, HgC, etc)do  not significantly interfere with this assay.   However, presence of multiple variants may affect accuracy.         Review of Systems   Constitutional: Negative for chills.   Cardiovascular:  Negative for chest pain and claudication.   Respiratory:  Negative for cough.    Skin:  Positive for color change, dry skin and nail changes.   Musculoskeletal:  Positive for joint pain.   Gastrointestinal:  Negative for nausea.   Neurological:  Positive for paresthesias. Negative for numbness.   Psychiatric/Behavioral:  The patient is not nervous/anxious.         Objective:     Physical Exam  Constitutional:       Appearance: She is well-developed.      Comments: Oriented to time, place, and person.   Cardiovascular:      Comments: DP and PT pulses are palpable bilaterally. 3 sec capillary refill time and toes and feet are warm to touch proximally .  There is  hair growth on the feet and toes b/l. There is no edema b/l. No spider veins or varicosities present b/l.     Musculoskeletal:      Comments: Equinus noted b/l ankles with < 10 deg DF noted. MMT 5/5 in DF/PF/Inv/Ev resistance with no reproduction of pain in any direction. Passive range of motion of ankle and pedal joints is painless b/l.    H/o L BKA    Feet:      Right foot:      Skin integrity: No callus or dry skin.      Left foot:      Skin integrity: No callus or dry skin.   Lymphadenopathy:      Comments: Negative lymphadenopathy bilateral popliteal fossa and tarsal tunnel.   Skin:     Comments: No open lesions, lacerations or wounds  noted.Interdigital spaces clean, dry and intact b/l. No erythema noted to b/l foot.    Toenails 1-5 right are elongated by 2-3 mm, thickened by 2-3 mm, discolored/yellowed, dystrophic, brittle with subungual debris.       Neurological:      Mental Status: She is alert.      Comments: Light touch, proprioception, and sharp/dull sensation are all intact bilaterally. Protective threshold with the [place]ien[place] monofilament is intact bilaterally.    Psychiatric:         Behavior: Behavior is cooperative.           Assessment:      Encounter Diagnoses   Name Primary?    Type II diabetes mellitus with neurological manifestations Yes    Hammer toe, unspecified laterality     S/P unilateral BKA (below knee amputation), left      Plan:     Audrey was seen today for diabetes mellitus, diabetic foot exam and nail care.    Diagnoses and all orders for this visit:    Type II diabetes mellitus with neurological manifestations  -     DIABETIC SHOES FOR HOME USE    Hammer toe, unspecified laterality  -     DIABETIC SHOES FOR HOME USE    S/P unilateral BKA (below knee amputation), left  -     DIABETIC SHOES FOR HOME USE      I counseled the patient on her conditions, their implications and medical management.      - Shoe inspection. Diabetic Foot Education. Patient reminded of the importance of good nutrition and blood sugar control to help prevent podiatric complications of diabetes. Patient instructed on proper foot hygeine. We discussed wearing proper shoe gear, daily foot inspections, never walking without protective shoe gear, never putting sharp instruments to feet, routine podiatric nail visits every 2-3 months.   - With patient's permission, nails were aggressively reduced and debrided x 5 to their soft tissue attachment mechanically and with electric , removing all offending nail and debris. Patient relates relief following the procedure. He will continue to monitor the areas daily, inspect his feet, wear  protective shoe gear when ambulatory, moisturizer to maintain skin integrity and follow in this office in approximately 2-3 months, sooner p.r.n.     Rx diabetic shoes with custom molded inserts to be worn at all times while ambulating. Prescription provided with list of local retailers.

## 2025-01-30 NOTE — TELEPHONE ENCOUNTER
No care due was identified.  Health [facility] Embedded Care Due Messages. Reference number: [id number].   1/30/2025 12:19:34 PM CST

## 2025-01-30 NOTE — TELEPHONE ENCOUNTER
Refill Routing Note   Medication(s) are not appropriate for processing by [facility] for the following reason(s):        No active prescription written by provider    [facility] action(s):  Defer               Appointments  past 12m or future 3m with PCP    Date Provider   Last Visit   12/10/2024 Donaldo Pena MD   Next Visit   3/25/2025 Donaldo Pena MD   ED visits in past 90 days: 0        Note composed:1:38 PM 01/30/2025

## 2025-01-31 ENCOUNTER — HOSPITAL ENCOUNTER (OUTPATIENT)
Dept: RADIOLOGY | Facility: HOSPITAL | Age: 55
Discharge: HOME | End: 2025-01-31
Attending: INTERNAL MEDICINE
Payer: MEDICAID

## 2025-01-31 DIAGNOSIS — Z12.31 ENCOUNTER FOR SCREENING MAMMOGRAM FOR MALIGNANT NEOPLASM OF BREAST: ICD-10-CM

## 2025-01-31 PROCEDURE — 77067 SCR MAMMO BI INCL CAD: CPT | Mod: 26,,, | Performed by: RADIOLOGY

## 2025-01-31 PROCEDURE — 77063 BREAST TOMOSYNTHESIS BI: CPT | Mod: 26,,, | Performed by: RADIOLOGY

## 2025-01-31 PROCEDURE — 77063 BREAST TOMOSYNTHESIS BI: CPT | Mod: TC

## 2025-02-06 ENCOUNTER — PATIENT MESSAGE (OUTPATIENT)
Dept: PULMONOLOGY | Facility: CLINIC | Age: 55
End: 2025-02-06
Payer: MEDICAID

## 2025-02-06 DIAGNOSIS — J44.9 CHRONIC OBSTRUCTIVE PULMONARY DISEASE, UNSPECIFIED COPD TYPE: ICD-10-CM

## 2025-02-06 DIAGNOSIS — G47.33 OSA (OBSTRUCTIVE SLEEP APNEA): Primary | ICD-10-CM

## 2025-02-06 RX ORDER — IPRATROPIUM BROMIDE AND ALBUTEROL SULFATE 2.5; .5 MG/3ML; MG/3ML
SOLUTION RESPIRATORY (INHALATION)
Qty: 270 ML | Refills: 3 | Status: SHIPPED | OUTPATIENT
Start: 2025-02-06

## 2025-02-06 NOTE — TELEPHONE ENCOUNTER
Refill Decision Note   Audrey Natarajan  is requesting a refill authorization.  Brief Assessment and Rationale for Refill:  Approve     Medication Therapy Plan:         Comments:     Note composed:2:03 PM 02/06/2025

## 2025-02-06 NOTE — TELEPHONE ENCOUNTER
No care due was identified.  Health [facility] Embedded Care Due Messages. Reference number: [id number].   2/06/2025 11:39:01 AM CST

## 2025-02-07 DIAGNOSIS — Z89.512 S/P BKA (BELOW KNEE AMPUTATION) UNILATERAL, LEFT: ICD-10-CM

## 2025-02-07 DIAGNOSIS — G54.6 PHANTOM LIMB PAIN: ICD-10-CM

## 2025-02-07 RX ORDER — TRAMADOL HYDROCHLORIDE 50 MG/1
50 TABLET ORAL EVERY 8 HOURS PRN
Qty: 60 TABLET | Refills: 0 | Status: SHIPPED | OUTPATIENT
Start: 2025-02-07

## 2025-02-07 NOTE — TELEPHONE ENCOUNTER
----- Message from Tech Tennille sent at 2/7/2025 10:11 AM CST -----  Regarding: Refill request  .Type: RX Refill Request    Who Called:self     Have you contacted your pharmacy:no     Refill or New Rx: Refill    RX Name and Strength: traMADoL (ULTRAM) 50 mg tablet    Preferred Pharmacy with phone number:.  [facility] #[id number] [address] AT [address] [address]  Phone: [phone] Fax: [phone]    Local or Mail Order:local     Ordering Provider:LEVON Pena     Would the patient rather a call back or a response via [facility]? Call     Best Call Back Number:.[phone]      Additional Information:

## 2025-02-07 NOTE — TELEPHONE ENCOUNTER
No care due was identified.  Health [facility] Embedded Care Due Messages. Reference number: [id number].   2/07/2025 10:18:36 AM CST

## 2025-02-10 ENCOUNTER — LAB VISIT (OUTPATIENT)
Dept: LAB | Facility: HOSPITAL | Age: 55
End: 2025-02-10
Attending: INTERNAL MEDICINE
Payer: MEDICAID

## 2025-02-10 DIAGNOSIS — E11.42 TYPE 2 DIABETES MELLITUS WITH DIABETIC POLYNEUROPATHY, WITH LONG-TERM CURRENT USE OF INSULIN: ICD-10-CM

## 2025-02-10 DIAGNOSIS — Z79.4 TYPE 2 DIABETES MELLITUS WITH DIABETIC POLYNEUROPATHY, WITH LONG-TERM CURRENT USE OF INSULIN: ICD-10-CM

## 2025-02-10 LAB
ALBUMIN SERPL BCP-MCNC: 3.4 G/DL (ref 3.5–5.2)
ALP SERPL-CCNC: 70 U/L (ref 40–150)
ALT SERPL W/O P-5'-P-CCNC: 18 U/L (ref 10–44)
ANION GAP SERPL CALC-SCNC: 14 MMOL/L (ref 8–16)
AST SERPL-CCNC: 18 U/L (ref 10–40)
BILIRUB SERPL-MCNC: 0.3 MG/DL (ref 0.1–1)
BUN SERPL-MCNC: 10 MG/DL (ref 6–20)
CALCIUM SERPL-MCNC: 9.2 MG/DL (ref 8.7–10.5)
CHLORIDE SERPL-SCNC: 95 MMOL/L (ref 95–110)
CO2 SERPL-SCNC: 27 MMOL/L (ref 23–29)
CREAT SERPL-MCNC: 0.7 MG/DL (ref 0.5–1.4)
ERYTHROCYTE [DISTWIDTH] IN BLOOD BY AUTOMATED COUNT: 15.2 % (ref 11.5–14.5)
EST. GFR  (NO RACE VARIABLE): >60 ML/MIN/1.73 M^2
ESTIMATED AVG GLUCOSE: 280 MG/DL (ref 68–131)
GLUCOSE SERPL-MCNC: 274 MG/DL (ref 70–110)
HBA1C MFR BLD: 11.4 % (ref 4–5.6)
HCT VFR BLD AUTO: 41.9 % (ref 37–48.5)
HGB BLD-MCNC: 13 G/DL (ref 12–16)
MCH RBC QN AUTO: 27.4 PG (ref 27–31)
MCHC RBC AUTO-ENTMCNC: 31 G/DL (ref 32–36)
MCV RBC AUTO: 88 FL (ref 82–98)
PLATELET # BLD AUTO: 428 K/UL (ref 150–450)
PMV BLD AUTO: 11.8 FL (ref 9.2–12.9)
POTASSIUM SERPL-SCNC: 4.3 MMOL/L (ref 3.5–5.1)
PROT SERPL-MCNC: 6.3 G/DL (ref 6–8.4)
RBC # BLD AUTO: 4.75 M/UL (ref 4–5.4)
SODIUM SERPL-SCNC: 136 MMOL/L (ref 136–145)
WBC # BLD AUTO: 13.17 K/UL (ref 3.9–12.7)

## 2025-02-10 PROCEDURE — 83036 HEMOGLOBIN GLYCOSYLATED A1C: CPT | Performed by: INTERNAL MEDICINE

## 2025-02-10 PROCEDURE — 85027 COMPLETE CBC AUTOMATED: CPT | Performed by: INTERNAL MEDICINE

## 2025-02-10 PROCEDURE — 80053 COMPREHEN METABOLIC PANEL: CPT | Performed by: INTERNAL MEDICINE

## 2025-02-10 PROCEDURE — 36415 COLL VENOUS BLD VENIPUNCTURE: CPT | Mod: PN | Performed by: INTERNAL MEDICINE

## 2025-02-17 ENCOUNTER — TELEPHONE (OUTPATIENT)
Dept: PULMONOLOGY | Facility: CLINIC | Age: 55
End: 2025-02-17
Payer: MEDICAID

## 2025-02-17 DIAGNOSIS — G47.33 OSA (OBSTRUCTIVE SLEEP APNEA): Primary | ICD-10-CM

## 2025-02-17 NOTE — TELEPHONE ENCOUNTER
----- Message from Sylvia sent at 2/17/2025  8:40 AM CST -----  Regarding: standard tube  Can you please send me an updated Rx for standard tubing? Thanks

## 2025-02-17 NOTE — TELEPHONE ENCOUNTER
Hello,     This patient have not read email in regard to sleep study. Could you let them know that     they have moderate sleep apnea (trouble breathing 17 times an hour).      Leading treatment options for the sleep apnea of this level of severity include CPAP and oral appliances for Obstructive sleep apnea (SHAWN).       Patient has the option of starting auto PAP or  if patient would like to review the study in more details and further discuss treatment options alternatives, and we will schedule a follow up appointment.     To minimize delay, I already placed an order for the Durable Medical Equipment (DME) to start working on getting patient set up for APAP. If patient is not ready for the CPAP, patient is not obligated to go through with the set up when you are contacted by the DME providers.       Thanks!  Kaushal

## 2025-02-18 ENCOUNTER — TELEPHONE (OUTPATIENT)
Dept: PULMONOLOGY | Facility: CLINIC | Age: 55
End: 2025-02-18
Payer: MEDICAID

## 2025-02-18 NOTE — TELEPHONE ENCOUNTER
Spoke with patient she received her cpap yesterday.      ----- Message from Med Assistant Idget sent at 2/17/2025 11:28 AM CST -----  Hello,  This patient have not read email in regard to sleep study. Could you let them know that  they have moderate sleep apnea (trouble breathing 17 times an hour).   Leading treatment options for the sleep apnea of this level of severity include CPAP and oral appliances for Obstructive sleep apnea (SHAWN).  Patient has the option of starting auto PAP or  if patient would like to review the study in more details and further discuss treatment options alternatives, and we will schedule a follow up appointment.  To minimize delay, I already placed an order for the Durable Medical Equipment (DME) to start working on getting patient set up for APAP. If patient is not ready for the CPAP, patient is not obligated to go through with the set up when you are contacted by the DME providers.   Thanks!Kaushal

## 2025-02-24 RX ORDER — MELOXICAM 7.5 MG/1
7.5 TABLET ORAL DAILY
Qty: 30 TABLET | Refills: 1 | Status: SHIPPED | OUTPATIENT
Start: 2025-02-24

## 2025-02-24 NOTE — TELEPHONE ENCOUNTER
----- Message from Lilliana sent at 2/24/2025 11:45 AM CST -----  Regarding: SABIHA DUNEN [[id number]]  Type: RX Refill Request  Who Called:patient   Have you contacted your pharmacy:  Refill or New Rx:  RX Name and Strength: #meloxicam (MOBIC) 7.5 MG tablet: patient called and stated that she is completely out of this medication and would like to receive a call back regarding refills. Thanks!!  Preferred Pharmacy with phone number:[facility] #[id number] - [place], [place] - [address] AT [address] [address]Erudg: [phone] Fax: [phone]  Local or Mail Order: local  Ordering Provider: Donaldo Pena  Would the patient rather a call back or a response via [facility]?call back    Best Call Back Number: [phone] Additional Information:

## 2025-02-24 NOTE — TELEPHONE ENCOUNTER
No care due was identified.  [facility] Embedded Care Due Messages. Reference number: [id number].   2/24/2025 12:48:35 PM CST

## 2025-02-25 ENCOUNTER — TELEPHONE (OUTPATIENT)
Dept: FAMILY MEDICINE | Facility: CLINIC | Age: 55
End: 2025-02-25
Payer: MEDICAID

## 2025-02-25 NOTE — TELEPHONE ENCOUNTER
----- Message from Victoria sent at 2/25/2025 11:08 AM CST -----  Who called: self What is the request in detail: pt need her prior authorization sent over for her  semaglutide (OZEMPIC) 2 mg/dose (8 mg/3 mL) PnICan the clinic reply by [facility]? No  Would the patient rather a call back or a response via [facility]? Call Back  Best call back number: .[phone]   Additional Information:

## 2025-03-05 DIAGNOSIS — Z89.512 S/P BKA (BELOW KNEE AMPUTATION) UNILATERAL, LEFT: ICD-10-CM

## 2025-03-05 DIAGNOSIS — G54.6 PHANTOM LIMB PAIN: ICD-10-CM

## 2025-03-05 NOTE — TELEPHONE ENCOUNTER
----- Message from Lupe sent at 3/5/2025  3:18 PM CST -----  .Type: RX Refill RequestWho Called: Self Have you contacted your pharmacy: No Refill or New Rx: Refill RX Name and Strength:LIDOcaine (LIDODERM) 5 %traMADoL (ULTRAM) 50 mg tabletPreferred Pharmacy with phone number:.[id number] [place], [place] [address] AT [address] [address]Mvdno: [phone] Fax: [phone] or Mail Order: Local Ordering Provider: Donaldo Pena Would the patient rather a call back or a response via [facility]? Call [facility] Call Back Number: .[phone] (home) Additional Information:

## 2025-03-05 NOTE — TELEPHONE ENCOUNTER
No care due was identified.  [facility] Embedded Care Due Messages. Reference number: [id number].   3/05/2025 3:18:16 PM CST

## 2025-03-06 RX ORDER — TRAMADOL HYDROCHLORIDE 50 MG/1
50 TABLET ORAL EVERY 8 HOURS PRN
Qty: 60 TABLET | Refills: 0 | Status: SHIPPED | OUTPATIENT
Start: 2025-03-06

## 2025-03-06 RX ORDER — LIDOCAINE 50 MG/G
PATCH TOPICAL
Qty: 15 PATCH | Refills: 1 | Status: SHIPPED | OUTPATIENT
Start: 2025-03-06

## 2025-03-06 NOTE — TELEPHONE ENCOUNTER
Refill Routing Note   Medication(s) are not appropriate for processing by [facility] for the following reason(s):        Outside of protocol    [facility] action(s):  Route             Appointments  past 12m or future 3m with PCP    Date Provider   Last Visit   12/10/2024 Donaldo Pena MD   Next Visit   3/5/2025 Donaldo Pena MD   ED visits in past 90 days: 0        Note composed:7:06 AM 03/06/2025

## 2025-03-17 ENCOUNTER — OFFICE VISIT (OUTPATIENT)
Dept: URGENT CARE | Facility: CLINIC | Age: 55
End: 2025-03-17
Payer: MEDICAID

## 2025-03-17 VITALS
BODY MASS INDEX: 36.84 KG/M2 | TEMPERATURE: 99 F | HEART RATE: 87 BPM | HEIGHT: 66 IN | DIASTOLIC BLOOD PRESSURE: 81 MMHG | SYSTOLIC BLOOD PRESSURE: 128 MMHG | OXYGEN SATURATION: 97 % | WEIGHT: 229.25 LBS | RESPIRATION RATE: 16 BRPM

## 2025-03-17 DIAGNOSIS — T14.8XXA EXCORIATION: ICD-10-CM

## 2025-03-17 DIAGNOSIS — M25.512 CHRONIC LEFT SHOULDER PAIN: Primary | ICD-10-CM

## 2025-03-17 DIAGNOSIS — M25.50 ARTHRALGIA, UNSPECIFIED JOINT: ICD-10-CM

## 2025-03-17 DIAGNOSIS — G89.29 CHRONIC LEFT SHOULDER PAIN: Primary | ICD-10-CM

## 2025-03-17 PROCEDURE — 99213 OFFICE O/P EST LOW 20 MIN: CPT | Mod: S$GLB,,, | Performed by: NURSE PRACTITIONER

## 2025-03-17 RX ORDER — MUPIROCIN 20 MG/G
OINTMENT TOPICAL 3 TIMES DAILY
Qty: 22 G | Refills: 0 | Status: SHIPPED | OUTPATIENT
Start: 2025-03-17 | End: 2025-03-22

## 2025-03-17 RX ORDER — TIZANIDINE 4 MG/1
4 TABLET ORAL EVERY 6 HOURS PRN
Qty: 28 TABLET | Refills: 0 | Status: SHIPPED | OUTPATIENT
Start: 2025-03-17 | End: 2025-03-24

## 2025-03-17 RX ORDER — KETOROLAC TROMETHAMINE 30 MG/ML
30 INJECTION, SOLUTION INTRAMUSCULAR; INTRAVENOUS
Status: COMPLETED | OUTPATIENT
Start: 2025-03-17 | End: 2025-03-17

## 2025-03-17 RX ADMIN — KETOROLAC TROMETHAMINE 30 MG: 30 INJECTION, SOLUTION INTRAMUSCULAR; INTRAVENOUS at 06:03

## 2025-03-17 NOTE — PROGRESS NOTES
"Subjective:      Patient ID: Audrey Natarajan is a 54 y.o. female.    Vitals:  height is 5' 6" (1.676 m) and weight is 104 kg (229 lb 4.5 oz). Her oral temperature is 98.6 °F (37 °C). Her blood pressure is 128/81 and her pulse is 87. Her respiration is 16 and oxygen saturation is 97%.     Chief Complaint: Generalized Body Aches    Pt staed she is having body pains that has been going on for over 5 weeks.  Patient has history of chronic pain.  Neuralgia arthralgias.  Patient is taking tramadol gabapentin and meloxicam for pain and discomfort.  Patient has appointment scheduled on the 21st of this month for primary care.    Other  This is a new problem. The current episode started 1 to 4 weeks ago (5 weeks). The problem occurs constantly. The problem has been gradually worsening. Associated symptoms include arthralgias. Pertinent negatives include no abdominal pain, anorexia, change in bowel habit, chest pain, chills, congestion, coughing, diaphoresis, fatigue, fever, headaches, joint swelling, myalgias, nausea, neck pain, numbness, rash, sore throat, swollen glands, urinary symptoms, vertigo, visual change, vomiting or weakness. The symptoms are aggravated by walking. She has tried NSAIDs and acetaminophen for the symptoms. The treatment provided no relief.       Constitution: Negative for chills, sweating, fatigue and fever.   HENT:  Negative for congestion and sore throat.    Neck: Negative for neck pain.   Cardiovascular:  Negative for chest pain.   Respiratory:  Negative for cough.    Gastrointestinal:  Negative for abdominal pain, nausea and vomiting.   Musculoskeletal:  Positive for joint pain. Negative for joint swelling and muscle ache.   Skin:  Negative for rash.   Neurological:  Negative for history of vertigo, headaches and numbness.      Objective:     Physical Exam   Constitutional: She is oriented to person, place, and time. She appears well-developed. She is cooperative. No distress.   HENT: "   Head: Normocephalic and atraumatic.   Nose: Nose normal.   Mouth/Throat: Oropharynx is clear and moist and mucous membranes are normal.   Eyes: Conjunctivae and lids are normal.   Neck: Trachea normal and phonation normal. Neck supple.   Cardiovascular: Normal rate, regular rhythm, normal heart sounds and normal pulses.   Pulmonary/Chest: Effort normal and breath sounds normal.   Abdominal: Normal appearance and bowel sounds are normal. She exhibits no mass. Soft.   Musculoskeletal:         General: No deformity.      Right shoulder: Normal.      Left shoulder: She exhibits decreased range of motion, tenderness and decreased strength. She exhibits no swelling and normal pulse.      Comments: Left shoulder pain with movement patient denies fall or injury.  Decrease in range of movement and lifting arms above head.   Neurological: She is alert and oriented to person, place, and time. She has normal strength and normal reflexes. No sensory deficit.   Skin: Skin is warm, dry, intact and not diaphoretic.         Comments: Excoriated lesions to face   Psychiatric: Her speech is normal and behavior is normal. Judgment and thought content normal.   Nursing note and vitals (Patient is awake alert and oriented and wheelchair-bound patient has left leg below-knee amputation) reviewed.      Assessment:     1. Chronic left shoulder pain    2. Arthralgia, unspecified joint    3. Excoriation      Discussed with patient RICE protocol and management at home patient will return tomorrow for left shoulder x-ray.    Plan:       Chronic left shoulder pain  -     ketorolac injection 30 mg  -     XR SHOULDER COMPLETE 2 OR MORE VIEWS LEFT; Future; Expected date: 03/17/2025  -     tiZANidine (ZANAFLEX) 4 MG tablet; Take 1 tablet (4 mg total) by mouth every 6 (six) hours as needed (muscle spasms and tightness shoulder).  Dispense: 28 tablet; Refill: 0    Arthralgia, unspecified joint    Excoriation  -     mupirocin (BACTROBAN) 2 %  ointment; Apply topically 3 (three) times daily. Apply to excoriations to face for 5 days  Dispense: 22 g; Refill: 0      Patient Instructions   Discharge instructions for chronic left shoulder pain arthralgias and excoriation to face  Patient is to start mupirocin ointment to face 3 times a day it at excoriated site  Patient is to start Zanaflex to help as a smooth muscle relaxant relaxant for muscle spasms and tightness  Patient will return tomorrow at 8:30 a.m. to  obtain left shoulder x-ray  Discussed with patient RICE protocol for management of pain and discomfort  Patient can also continue to take her normally prescribed medication for management of neuralgias and  arthralgias.   RICE - Rest, ice, compression and elevation to the affected joint or limb as needed.    Rest. Allow your injury to heal before you do slow movements.  Place an ice pack or a bag of frozen peas wrapped in a towel over the painful part. Never put ice right on the skin. Do not leave the ice on more than 10 to 15 minutes at a time. Ice after activity may help decrease pain and swelling. Never ice before stretching.  Prop your wrist/arm/knee/pain on pillows to help with swelling.  Use a splint/ brace if the doctor tells you to do this.  Please drink plenty of fluids.  Please get plenty of rest.    If you were prescribed a narcotic medication, do not drive or operate heavy equipment or machinery while taking these medications.    If you were not prescribed an anti-inflammatory medication, and if you do not have any history of stomach/intestinal ulcers, or kidney disease, or are not taking a blood thinner such as Coumadin, Plavix, Pradaxa, Eloquis, or Xaralta for example, it is OK to take over the counter Ibuprofen or Advil or Motrin or Aleve as directed.  Do not take these medications on an empty stomach.    Please remember that you have received care at an urgent care today. Urgent cares are not emergency rooms and are not equipped to  handle life threatening emergencies and cannot rule in or out certain medical conditions and you may be released before all of your medical problems are known or treated. Please arrange follow up with your primary care physician or speciality clinic   (orthopedics) within 2-5 days if your signs and symptoms have not resolved or worsen. Patient can call our Referral Hotline at [phone] to make an appointment.    Please return here or go to the Emergency Department for any concerns or worsening of condition.Patient was educated on signs/symptoms that would warrant emergent medical attention. Patient verbalized understanding.  More trouble getting up from a chair, going up and down stairs, or walking  Pain, swelling, warmth, numbness, tingling, or discoloration in the calf below the injured or sore knee  You are not feeling better in 2 or 3 days or you are feeling worse

## 2025-03-17 NOTE — PATIENT INSTRUCTIONS
Discharge instructions for chronic left shoulder pain arthralgias and excoriation to face  Patient is to start mupirocin ointment to face 3 times a day it at excoriated site  Patient is to start Zanaflex to help as a smooth muscle relaxant relaxant for muscle spasms and tightness  Patient will return tomorrow at 8:30 a.m. to  obtain left shoulder x-ray  Discussed with patient RICE protocol for management of pain and discomfort  Patient can also continue to take her normally prescribed medication for management of neuralgias and  arthralgias.   RICE - Rest, ice, compression and elevation to the affected joint or limb as needed.    Rest. Allow your injury to heal before you do slow movements.  Place an ice pack or a bag of frozen peas wrapped in a towel over the painful part. Never put ice right on the skin. Do not leave the ice on more than 10 to 15 minutes at a time. Ice after activity may help decrease pain and swelling. Never ice before stretching.  Prop your wrist/arm/knee/pain on pillows to help with swelling.  Use a splint/ brace if the doctor tells you to do this.  Please drink plenty of fluids.  Please get plenty of rest.    If you were prescribed a narcotic medication, do not drive or operate heavy equipment or machinery while taking these medications.    If you were not prescribed an anti-inflammatory medication, and if you do not have any history of stomach/intestinal ulcers, or kidney disease, or are not taking a blood thinner such as Coumadin, Plavix, Pradaxa, Eloquis, or Xaralta for example, it is OK to take over the counter Ibuprofen or Advil or Motrin or Aleve as directed.  Do not take these medications on an empty stomach.    Please remember that you have received care at an urgent care today. Urgent cares are not emergency rooms and are not equipped to handle life threatening emergencies and cannot rule in or out certain medical conditions and you may be released before all of your medical problems  are known or treated. Please arrange follow up with your primary care physician or speciality clinic   (orthopedics) within 2-5 days if your signs and symptoms have not resolved or worsen. Patient can call our Referral Hotline at [phone] to make an appointment.    Please return here or go to the Emergency Department for any concerns or worsening of condition.Patient was educated on signs/symptoms that would warrant emergent medical attention. Patient verbalized understanding.  More trouble getting up from a chair, going up and down stairs, or walking  Pain, swelling, warmth, numbness, tingling, or discoloration in the calf below the injured or sore knee  You are not feeling better in 2 or 3 days or you are feeling worse

## 2025-03-18 ENCOUNTER — RESULTS FOLLOW-UP (OUTPATIENT)
Dept: URGENT CARE | Facility: CLINIC | Age: 55
End: 2025-03-18

## 2025-03-18 DIAGNOSIS — G89.29 CHRONIC LEFT SHOULDER PAIN: Primary | ICD-10-CM

## 2025-03-18 DIAGNOSIS — M25.512 CHRONIC LEFT SHOULDER PAIN: Primary | ICD-10-CM

## 2025-03-19 ENCOUNTER — OFFICE VISIT (OUTPATIENT)
Dept: URGENT CARE | Facility: CLINIC | Age: 55
End: 2025-03-19
Payer: MEDICAID

## 2025-03-19 ENCOUNTER — TELEPHONE (OUTPATIENT)
Dept: URGENT CARE | Facility: CLINIC | Age: 55
End: 2025-03-19
Payer: MEDICAID

## 2025-03-19 ENCOUNTER — TELEPHONE (OUTPATIENT)
Dept: FAMILY MEDICINE | Facility: CLINIC | Age: 55
End: 2025-03-19
Payer: MEDICAID

## 2025-03-19 VITALS
WEIGHT: 220 LBS | HEIGHT: 66 IN | RESPIRATION RATE: 19 BRPM | BODY MASS INDEX: 35.36 KG/M2 | OXYGEN SATURATION: 98 % | TEMPERATURE: 98 F | HEART RATE: 88 BPM | DIASTOLIC BLOOD PRESSURE: 60 MMHG | SYSTOLIC BLOOD PRESSURE: 118 MMHG

## 2025-03-19 DIAGNOSIS — M67.912 TENDINOPATHY OF LEFT SHOULDER: Primary | ICD-10-CM

## 2025-03-19 RX ORDER — DEXAMETHASONE SODIUM PHOSPHATE 10 MG/ML
10 INJECTION INTRAMUSCULAR; INTRAVENOUS
Status: COMPLETED | OUTPATIENT
Start: 2025-03-19 | End: 2025-03-19

## 2025-03-19 RX ORDER — KETOROLAC TROMETHAMINE 30 MG/ML
30 INJECTION, SOLUTION INTRAMUSCULAR; INTRAVENOUS
Status: COMPLETED | OUTPATIENT
Start: 2025-03-19 | End: 2025-03-19

## 2025-03-19 RX ADMIN — DEXAMETHASONE SODIUM PHOSPHATE 10 MG: 10 INJECTION INTRAMUSCULAR; INTRAVENOUS at 07:03

## 2025-03-19 RX ADMIN — KETOROLAC TROMETHAMINE 30 MG: 30 INJECTION, SOLUTION INTRAMUSCULAR; INTRAVENOUS at 07:03

## 2025-03-19 NOTE — TELEPHONE ENCOUNTER
Last Office Visit Info:   The patient's last visit with Donaldo Pena MD was on 12/10/2024.    The patient's last visit in current department was on 12/10/2024.      Upcoming visit 03/25/2025.

## 2025-03-19 NOTE — TELEPHONE ENCOUNTER
Returned call to Audrey who explained that she is in excruciating pain and the Tramadol/Tizanidine that was given to her is not working. Patient stressed she needs something stronger. Nurse explained that an OV is needed to prescribe new medication and she has the earliest appointment with Donaldo Pena MD available. Nurse offered an earlier appointment with another provider @ a different location. Patient accepted the appointment. Scheduled appointment for 03/20/2025.

## 2025-03-19 NOTE — TELEPHONE ENCOUNTER
Patient called clinic about the x-ray that she received yesterday.  No findings of acute fracture or dislocation.  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  I discussed this with patient, and to continue with plan of care as discussed with the urgent care provider yesterday.  Referral to orthopedics has already been placed for her.

## 2025-03-19 NOTE — PROGRESS NOTES
"Subjective:      Patient ID: Audrey Natarajan is a 54 y.o. female.    Vitals:  height is 5' 6" (1.676 m) and weight is 99.8 kg (220 lb). Her oral temperature is 98.2 °F (36.8 °C). Her blood pressure is 118/60 and her pulse is 88. Her respiration is 19 and oxygen saturation is 98%.     Chief Complaint: Shoulder Pain    Pt is here for  Left shoulder , neck and arm pain radiating down to wrist which started 5 weeks ago.   3/11 Tuesday pt was seen and received and injection along with pain meds being prescribed.     Shoulder Pain   The pain is present in the left shoulder, left arm, neck and left hand. This is a recurrent problem. The current episode started more than 1 month ago. The problem occurs constantly. The problem has been gradually worsening. The quality of the pain is described as aching. The pain is at a severity of 10/10. The pain is severe. Associated symptoms include joint swelling and tingling. Pertinent negatives include no fever, headaches, inability to bear weight, itching, joint locking, limited range of motion, numbness, stiffness or visual symptoms. She has tried heat for the symptoms. The treatment provided no relief. Family history includes arthritis. Her past medical history is significant for diabetes. There is no history of Injuries to Extremity or migraines.       Constitution: Negative for fever.   Musculoskeletal:  Negative for abnormal ROM of joint.   Neurological:  Negative for headaches and numbness.      Objective:     Physical Exam   Constitutional: She is oriented to person, place, and time.   HENT:   Head: Normocephalic.   Ears:   Right Ear: External ear normal.   Left Ear: External ear normal.   Nose: Nose normal.   Mouth/Throat: Mucous membranes are moist.   Eyes: Conjunctivae are normal.   Cardiovascular: Normal rate.   Pulmonary/Chest: Effort normal.   Musculoskeletal: Normal range of motion.         General: Swelling (left hand) and tenderness present. Normal range of " motion.   Neurological: She is alert and oriented to person, place, and time.   Skin: Skin is dry.   Psychiatric: Her behavior is normal.       Assessment:     1. Tendinopathy of left shoulder        Plan:       Tendinopathy of left shoulder  -     ketorolac injection 30 mg  -     dexAMETHasone injection 10 mg        Exacerbation  Will see IM clinic tomorrow  States toradol really gave her relief last time.   States she wants to try anti inflammatory steorid; has sliding scale at home.   F/u w pcp

## 2025-03-19 NOTE — TELEPHONE ENCOUNTER
----- Message from Esperanza sent at 3/19/2025  1:07 PM CDT -----  Regarding: self  Who Called: selfSymptoms (please be specific): left shoulder pain. Calcium pressing up against shoulder. Got trimadol but is not getting any relief. Ortho didn't have anything til June and told her to reach out to pcpHow long has patient had these symptoms:  Pharmacy: [id number] - [place], [place] - [address] [facility] [address] AT [address] [address]Gycuw: [phone] Fax: [phone] the patient rather a call back or a response via [facility]?Best Call Back Number: [phone] Information:

## 2025-03-20 ENCOUNTER — OFFICE VISIT (OUTPATIENT)
Dept: INTERNAL MEDICINE | Facility: CLINIC | Age: 55
End: 2025-03-20
Payer: MEDICAID

## 2025-03-20 VITALS
OXYGEN SATURATION: 97 % | HEART RATE: 100 BPM | BODY MASS INDEX: 35.36 KG/M2 | SYSTOLIC BLOOD PRESSURE: 130 MMHG | HEIGHT: 66 IN | WEIGHT: 220 LBS | DIASTOLIC BLOOD PRESSURE: 64 MMHG

## 2025-03-20 DIAGNOSIS — M54.12 CERVICAL RADICULOPATHY: ICD-10-CM

## 2025-03-20 DIAGNOSIS — M54.2 CERVICALGIA: Primary | ICD-10-CM

## 2025-03-20 PROCEDURE — 99999 PR PBB SHADOW E&M-EST. PATIENT-LVL III: CPT | Mod: PBBFAC,,,

## 2025-03-20 PROCEDURE — 99213 OFFICE O/P EST LOW 20 MIN: CPT | Mod: PBBFAC

## 2025-03-20 RX ORDER — OXYCODONE AND ACETAMINOPHEN 10; 325 MG/1; MG/1
1 TABLET ORAL EVERY 12 HOURS PRN
Qty: 10 TABLET | Refills: 0 | Status: SHIPPED | OUTPATIENT
Start: 2025-03-20

## 2025-03-20 RX ORDER — OXYCODONE AND ACETAMINOPHEN 10; 325 MG/1; MG/1
1 TABLET ORAL EVERY 12 HOURS PRN
Qty: 10 TABLET | Refills: 0 | Status: SHIPPED | OUTPATIENT
Start: 2025-03-25 | End: 2025-03-20

## 2025-03-20 NOTE — PROGRESS NOTES
[facility]  CLINIC NOTE    Patient Name: Audrey Natarajan  YOB: 1970    PRESENTING HISTORY       History of Present Illness:  Ms. Audrey Natarajan is a 54 y.o. female with a medical history of diabetic neuropathy, Bipolar 1, COPD, HTN, HLD, PAD, Smoker who came in to the clinic for an urgent care visit. Pt complaining of neck pain that started 5 weeks ago. Pt started on the left side of her neck and radiate to the shoulder arm and hand. Reports swollen arm, decrease range of motion and strength. She has visited multiple urgent care clinic where she was given Toradol and dexamethasone shots with minimal relief.  XR shoulder showed Diffuse osteopenia. Arthrosis of the acromioclavicular joint. No evidence of acute fracture or dislocation. A few calcifications adjacent to the humeral head which may be seen with calcific tendinitis. Mild arthrosis of the acromioclavicular joint. No soft tissue abnormality. Pt was referred to ortho and appointment is due for May. Pt has an appointment with PCP on 03/25. She is taking gabapentin, tylenol, meloxicam, methocarbamol, tramadol and tizandine with minimal pain improvement. Denies headaches, visual changes, SOB, cough, chest pain, palpitation, nausea, vomiting, abdominal pain, diarrhea, constipation, urinary frequency.      Review of Systems   Constitutional:  Negative for chills, fever, malaise/fatigue and weight loss.   HENT:  Negative for congestion, sinus pain and sore throat.    Eyes:  Negative for blurred vision and double vision.   Respiratory:  Negative for cough, shortness of breath and wheezing.    Cardiovascular:  Negative for chest pain, palpitations and leg swelling.   Gastrointestinal:  Negative for abdominal pain, constipation, diarrhea, nausea and vomiting.   Genitourinary:  Negative for dysuria and frequency.   Musculoskeletal:  Positive for joint pain and neck pain. Negative for back pain and myalgias.   Skin:   Negative for itching and rash.   Neurological:  Negative for dizziness, weakness and headaches.         PAST HISTORY:     Past Medical History:   Diagnosis Date    ADHD (attention deficit hyperactivity disorder)     Arthritis     Asthma     Bipolar 1 disorder     Cataract     Cigarette smoker     COPD (chronic obstructive pulmonary disease)     Coronary artery disease     A fib    Depression     bipolar manic depresson    Diabetes mellitus     Diabetic foot ulcers     Diabetic neuropathy     DVT of lower extremity, bilateral 07/2013    bilateral LE DVT. Estelita filter placed.     Encounter for blood transfusion     History of blood clots 1. Left Leg=2003; 2.Bilateral Groin=Blood Clots= 5 or 6/ 2013 & 7/2013; 3. LLL of Lung=7/2013;  4. Lt. Lower Leg=7/2013.     Pt. had 1st Blood Clot after Noxbgpxextzl=8550, & Last=2013. [place] Filter= Rt.Lateral Neck.    HTN (hypertension) 06/06/2013    Pt states that she does not have hypertension    Hypercholesteremia     Irregular heartbeat     Neuromuscular disorder     neuropathy feet    Obese     PE (pulmonary embolism) 07/2013    bilat LE DVT.     Restless leg syndrome        Past Surgical History:   Procedure Laterality Date    ABDOMINAL SURGERY  2010    gastric sleeve    BELOW KNEE AMPUTATION OF LOWER EXTREMITY Left 4/19/2023    Procedure: AMPUTATION, BELOW KNEE;  Surgeon: Gabe Munoz MD;  Location: [facility] OR;  Service: General;  Laterality: Left;  RN PREOP 4/11/2023    BILATERAL OOPHORECTOMY Bilateral 1/12/2015    CHOLECYSTECTOMY      DEBRIDEMENT OF FOOT Bilateral 5/10/2022    Procedure: DEBRIDEMENT, FOOT;  Surgeon: Maira De Los Santos DPM;  Location: [facility] OR;  Service: Podiatry;  Laterality: Bilateral;    DEBRIDEMENT OF FOOT Left 2/28/2023    Procedure: DEBRIDEMENT, FOOT,biopsy;  Surgeon: Maira De Los Santos DPM;  Location: [facility] OR;  Service: Podiatry;  Laterality: Left;  request misonix, wound VAC, possible neoxx    Green' s filter Right 7/4/2012    Right Neck &  "Tunneled Down.    HERNIA REPAIR      "[place] of Hernias Repaires around th Belly Button.", pt. states    INCISION AND DRAINAGE FOOT Left 2022    Procedure: INCISION AND DRAINAGE, FOOT;  Surgeon: Fahad Razo DPM;  Location: [facility] OR;  Service: Podiatry;  Laterality: Left;    LAPAROSCOPIC CHOLECYSTECTOMY N/A 9/10/2020    Procedure: CHOLECYSTECTOMY, LAPAROSCOPIC;  Surgeon: Montrell Gutierrez MD;  Location: [facility] OR;  Service: General;  Laterality: N/A;  RN PREOP ----COVID Negative      OVARIAN CYST REMOVAL  3/13/2014    [place] REMOVAL OF OVARY/TUBE(S)      SPLENECTOMY, TOTAL  2003    TONSILLECTOMY      as a child    TYMPANOSTOMY TUBE PLACEMENT      VEIN SURGERY      Lt leg       Family History   Problem Relation Name Age of Onset    Hypertension Father      Diabetes Father      Heart disease Father      Cataracts Father      Diabetes Paternal Grandfather      Heart disease Paternal Grandfather      No Known Problems Mother      Ovarian cancer Maternal Grandmother           from this. ? age     No Known Problems Sister      No Known Problems Brother      No Known Problems Maternal Aunt      No Known Problems Maternal Uncle      No Known Problems Paternal Aunt      No Known Problems Paternal Uncle      No Known Problems Maternal Grandfather      Ovarian cancer Paternal Grandmother      Uterine cancer Neg Hx      Breast cancer Neg Hx      Colon cancer Neg Hx      Amblyopia Neg Hx      Blindness Neg Hx      Cancer Neg Hx      Glaucoma Neg Hx      Macular degeneration Neg Hx      Retinal detachment Neg Hx      Strabismus Neg Hx      Stroke Neg Hx      Thyroid disease Neg Hx         Social History     Socioeconomic History    Marital status: Significant Other   Tobacco Use    Smoking status: Former     Current packs/day: 0.00     Average packs/day: 0.5 packs/day for 37.0 years (18.5 ttl pk-yrs)     Types: Cigarettes     Start date: 1983     Quit date: 2020     Years since quittin.2    " Smokeless tobacco: Current    Tobacco comments:     Enrolled in the [employer] on 5/3/14 ([facility] Member ID # [id number]). Ambulatory referral to Smoking Cessation Program   Substance and Sexual Activity    Alcohol use: No     Alcohol/week: 0.0 standard drinks of alcohol    Drug use: No    Sexual activity: Yes     Partners: Male   Social History Narrative     from her  of 20 years    Lives by herself since 2019     Social Drivers of Health     Financial Resource Strain: Medium Risk (3/6/2023)    Overall Financial Resource Strain (CARDIA)     Difficulty of Paying Living Expenses: Somewhat hard   Food Insecurity: Food Insecurity Present (3/6/2023)    Hunger Vital Sign     Worried About Running Out of Food in the Last Year: Often true     Ran Out of Food in the Last Year: Often true   Transportation Needs: Unmet Transportation Needs (3/6/2023)    PRAPARE - Transportation     Lack of Transportation (Medical): Yes     Lack of Transportation (Non-Medical): Yes   Physical Activity: Inactive (3/6/2023)    Exercise Vital Sign     Days of Exercise per Week: 0 days     Minutes of Exercise per Session: 0 min   Stress: Stress Concern Present (3/6/2023)    [employer] - Occupational Stress Questionnaire     Feeling of Stress : To some extent   Housing Stability: Low Risk  (3/20/2023)    Housing Stability Vital Sign     Unable to Pay for Housing in the Last Year: No     Number of Places Lived in the Last Year: 1     Unstable Housing in the Last Year: No       MEDICATIONS & ALLERGIES:     Current Outpatient Medications on File Prior to Visit   Medication Sig    ADVAIR DISKUS 250-50 mcg/dose diskus inhaler INHALE 1 PUFF INTO THE LUNGS TWICE DAILY    albuterol (PROVENTIL/VENTOLIN HFA) 90 mcg/actuation inhaler INHALE 2 PUFFS INTO THE LUNGS EVERY 6 HOURS AS NEEDED FOR WHEEZING    albuterol-ipratropium (DUO-NEB) 2.5 mg-0.5 mg/3 mL nebulizer solution USE 3 ML VIA NEBULIZER EVERY 6 HOURS AS  NEEDED FOR WHEEZING OR SHORTNESS OF BREATH OR RESCUE    ammonium lactate (LAC-HYDRIN) 12 % lotion APPL Y ONCE TOPICALLY TWICE DAILY FOR 30 DAYS    apixaban (ELIQUIS) 5 mg Tab Take 1 tablet (5 mg total) by mouth 2 (two) times daily.    aspirin 81 MG Chew Take 1 tablet (81 mg total) by mouth once daily.    atorvastatin (LIPITOR) 40 MG tablet Take 1 tablet (40 mg total) by mouth once daily.    BIOFREEZE, MENTHOL, TOP Apply topically.    blood sugar diagnostic Strp To check BG three times daily, to use with insurance preferred meter    blood-glucose meter (TRUE METRIX GLUCOSE METER) Misc USE TO TEST BLOOD GLUCOSE THREE TIMES DAILY AS DIRECTED    bumetanide (BUMEX) 1 MG tablet Take 1 tablet (1 mg total) by mouth once daily.    cyanocobalamin (VITAMIN B-12) 1000 MCG tablet Take 100 mcg by mouth once daily.    diclofenac sodium (VOLTAREN) 1 % Gel Apply 2 g topically 2 (two) times daily.    diclofenac sodium (VOLTAREN) 1 % Gel Apply 2 g topically 4 (four) times daily as needed (bilateral hands and knees).    divalproex (DEPAKOTE) 500 MG TbEC Take 1 tablet (500 mg total) by mouth every evening.    DUPIXENT  mg/2 mL PnIj Inject into the skin every 14 (fourteen) days.    ferrous sulfate 325 (65 FE) MG EC tablet Take 1 tablet (325 mg total) by mouth once daily.    fluconazole (DIFLUCAN) 150 MG Tab Take 1 tablet (150 mg total) by mouth once daily. May take second tab po two days after first if symptoms persist    fluticasone propionate (FLONASE) 50 mcg/actuation nasal spray 1 spray (50 mcg total) by Each Nostril route once daily.    gabapentin (NEURONTIN) 300 MG capsule Take 2 capsules (600 mg total) by mouth 3 (three) times daily.    hydrOXYzine HCL (ATARAX) 25 MG tablet Take 1 tablet (25 mg total) by mouth every 6 (six) hours as needed for itching or anxiety.    insulin aspart U-100 (NOVOLOG) 100 unit/mL (3 mL) InPn pen ADMINISTER 10 UNITS UNDER THE SKIN THREE TIMES DAILY    insulin detemir U-100, Levemir, 100 unit/mL  "(3 mL) SubQ InPn pen Inject 15 Units into the skin 2 (two) times daily.    lancets Misc To check BG three times daily, to use with insurance preferred meter    LIDOcaine (LIDODERM) 5 % UNWRAP AND APPLY 1 PATCH TO SKIN ONCE DAILY. REMOVE AND DISCARD AFTER 12 HOURS    lisinopriL 10 MG tablet Take 1 tablet (10 mg total) by mouth every evening.    loratadine (CLARITIN) 10 mg tablet Take 1 tablet (10 mg total) by mouth once daily.    meloxicam (MOBIC) 7.5 MG tablet Take 1 tablet (7.5 mg total) by mouth once daily.    metFORMIN (GLUCOPHAGE) 1000 MG tablet Take 1 tablet (1,000 mg total) by mouth 2 (two) times daily with meals.    methocarbamoL (ROBAXIN) 500 MG Tab TAKE 1 TABLET(500 MG) BY MOUTH THREE TIMES DAILY as needed for back pain    metoprolol tartrate (LOPRESSOR) 25 MG tablet Take 1 tablet (25 mg total) by mouth 2 (two) times daily.    multivitamin Tab Take 1 tablet by mouth once daily.    mupirocin (BACTROBAN) 2 % ointment Apply topically 3 (three) times daily. Right nostril    mupirocin (BACTROBAN) 2 % ointment Apply topically 3 (three) times daily. Apply to excoriations to face for 5 days    nystatin (NYSTOP) powder APPLY TO ABDOMINAL AND BREAST SKIN FOLD TWICE DAILY    pantoprazole (PROTONIX) 40 MG tablet Take 1 tablet (40 mg total) by mouth once daily.    pen needle, diabetic (BD ERIC 2ND GEN PEN NEEDLE) 32 gauge x 5/32" Ndle USE TO INJECT INSULIN FOUR TIMES DAILY AS DIRECTED    risperiDONE (RISPERDAL) 3 MG Tab Take 1 tablet (3 mg total) by mouth in the morning and 1 tablet (3 mg total) in the evening. Indications: Mood.    semaglutide (OZEMPIC) 2 mg/dose (8 mg/3 mL) PnIj Inject 2 mg into the skin every 7 days.    tiZANidine (ZANAFLEX) 4 MG tablet Take 1 tablet (4 mg total) by mouth every 6 (six) hours as needed (muscle spasms and tightness shoulder).    traMADoL (ULTRAM) 50 mg tablet Take 1 tablet (50 mg total) by mouth every 8 (eight) hours as needed for Pain.    vitamin E 1000 UNIT capsule Take 1,000 Units " "by mouth once daily.    VYVANSE 40 mg Cap Take 40 mg by mouth once daily.    [DISCONTINUED] furosemide (LASIX) 20 MG tablet TAKE 1 TABLET(20 MG) BY MOUTH EVERY DAY    [DISCONTINUED] QUEtiapine (SEROQUEL) 200 MG Tab Take 1 tablet (200 mg total) by mouth before breakfast.     Current Facility-Administered Medications on File Prior to Visit   Medication    [COMPLETED] dexAMETHasone injection 10 mg    [COMPLETED] ketorolac injection 30 mg       Review of patient's allergies indicates:   Allergen Reactions    Morphine Other (See Comments)     Patient had a psychotic episode after taking Morphine  Agitation, hallucinations  Other Reaction(s): Other (See Comments), Other (See Comments)    Patient had a psychotic episode after taking Morphine    Patient had a psychotic episode after taking Morphine Agitation, hallucinations    Penicillins Anaphylaxis     Tolerated cephalosporins in the past    Januvia [sitagliptin] Hives    Carbamazepine Other (See Comments)     hyponatremia  Other Reaction(s): Other (See Comments)    hyponatremia       OBJECTIVE:   Vital Signs:  Vitals:    03/20/25 1431   BP: 130/64   Pulse: 100   SpO2: 97%   Weight: 99.8 kg (220 lb 0.3 oz)   Height: 5' 6" (1.676 m)       No results found for this or any previous visit (from the past 24 hours).      Physical Exam  Constitutional:       General: She is not in acute distress.  HENT:      Head: Normocephalic and atraumatic.   Eyes:      Extraocular Movements: Extraocular movements intact.      Pupils: Pupils are equal, round, and reactive to light.   Cardiovascular:      Rate and Rhythm: Normal rate and regular rhythm.      Heart sounds: No murmur heard.  Pulmonary:      Effort: Pulmonary effort is normal. No respiratory distress.      Breath sounds: No wheezing or rales.   Abdominal:      General: Abdomen is flat. Bowel sounds are normal. There is no distension.      Palpations: Abdomen is soft.      Tenderness: There is no abdominal tenderness. "   Musculoskeletal:         General: No swelling.      Left shoulder: Tenderness present. Decreased range of motion. Decreased strength.      Left upper arm: Tenderness present.      Left elbow: Decreased range of motion. Tenderness present.      Left forearm: Swelling and tenderness present.      Left wrist: Swelling and tenderness present. Decreased range of motion.      Left hand: Swelling and tenderness present. Decreased range of motion. Decreased strength.      Cervical back: Tenderness present. Pain with movement present. Decreased range of motion.      Right lower leg: No edema.      Left lower leg: No edema.      Left Lower Extremity: Left leg is amputated below knee.   Lymphadenopathy:      Cervical: No cervical adenopathy.   Skin:     General: Skin is warm and dry.      Coloration: Skin is not jaundiced.      Findings: No bruising.   Neurological:      General: No focal deficit present.      Mental Status: She is alert and oriented to person, place, and time.         ASSESSMENT & PLAN:     Pt presenting with 5 weeks of cervical pain radiating to the shoulder. Physical exam remarkable for decrease range of motion, tenderness to palpation and decrease strength. Will order CT cervical spine and percocet to last until her PCP appointment on 03/25/25. Strict instruction to go to the ED if pain worsen.     1. Cervicalgia  -     CT Cervical Spine W Wo Contrast; Future; Expected date: 03/20/2025  -     oxyCODONE-acetaminophen (PERCOCET)  mg per tablet; Take 1 tablet by mouth every 12 (twelve) hours as needed for Pain.  Dispense: 10 tablet; Refill: 0    2. Cervical radiculopathy  -     oxyCODONE-acetaminophen (PERCOCET)  mg per tablet; Take 1 tablet by mouth every 12 (twelve) hours as needed for Pain.  Dispense: 10 tablet; Refill: 0        Discussed with Dr. Khoury    Follow up with PCP on 03/25/25      Jc Curry MD/MPH  Internal Medicine PGY-2  [facility]  [address]  [place] [address]

## 2025-03-25 ENCOUNTER — OFFICE VISIT (OUTPATIENT)
Dept: FAMILY MEDICINE | Facility: CLINIC | Age: 55
End: 2025-03-25
Payer: MEDICAID

## 2025-03-25 VITALS
BODY MASS INDEX: 35.36 KG/M2 | WEIGHT: 220 LBS | HEIGHT: 66 IN | SYSTOLIC BLOOD PRESSURE: 128 MMHG | HEART RATE: 93 BPM | TEMPERATURE: 98 F | DIASTOLIC BLOOD PRESSURE: 58 MMHG | OXYGEN SATURATION: 96 %

## 2025-03-25 DIAGNOSIS — E66.01 MORBID OBESITY: ICD-10-CM

## 2025-03-25 DIAGNOSIS — Z89.512 S/P BKA (BELOW KNEE AMPUTATION) UNILATERAL, LEFT: ICD-10-CM

## 2025-03-25 DIAGNOSIS — Z86.718 HISTORY OF DVT (DEEP VEIN THROMBOSIS): ICD-10-CM

## 2025-03-25 DIAGNOSIS — M54.12 CERVICAL RADICULOPATHY: ICD-10-CM

## 2025-03-25 DIAGNOSIS — M54.2 CERVICALGIA: ICD-10-CM

## 2025-03-25 DIAGNOSIS — Z79.4 TYPE 2 DIABETES MELLITUS WITH DIABETIC POLYNEUROPATHY, WITH LONG-TERM CURRENT USE OF INSULIN: ICD-10-CM

## 2025-03-25 DIAGNOSIS — Z12.11 SCREENING FOR COLORECTAL CANCER: ICD-10-CM

## 2025-03-25 DIAGNOSIS — E11.42 TYPE 2 DIABETES MELLITUS WITH DIABETIC POLYNEUROPATHY, WITH LONG-TERM CURRENT USE OF INSULIN: ICD-10-CM

## 2025-03-25 DIAGNOSIS — F31.9 BIPOLAR 1 DISORDER: ICD-10-CM

## 2025-03-25 DIAGNOSIS — I10 ESSENTIAL HYPERTENSION: Primary | ICD-10-CM

## 2025-03-25 DIAGNOSIS — Z12.12 SCREENING FOR COLORECTAL CANCER: ICD-10-CM

## 2025-03-25 PROCEDURE — 4010F ACE/ARB THERAPY RXD/TAKEN: CPT | Mod: CPTII,,, | Performed by: INTERNAL MEDICINE

## 2025-03-25 PROCEDURE — 99214 OFFICE O/P EST MOD 30 MIN: CPT | Mod: S$PBB,,, | Performed by: INTERNAL MEDICINE

## 2025-03-25 PROCEDURE — 3074F SYST BP LT 130 MM HG: CPT | Mod: CPTII,,, | Performed by: INTERNAL MEDICINE

## 2025-03-25 PROCEDURE — 3078F DIAST BP <80 MM HG: CPT | Mod: CPTII,,, | Performed by: INTERNAL MEDICINE

## 2025-03-25 PROCEDURE — 1159F MED LIST DOCD IN RCRD: CPT | Mod: CPTII,,, | Performed by: INTERNAL MEDICINE

## 2025-03-25 PROCEDURE — 99999 PR PBB SHADOW E&M-EST. PATIENT-LVL V: CPT | Mod: PBBFAC,,, | Performed by: INTERNAL MEDICINE

## 2025-03-25 PROCEDURE — 1160F RVW MEDS BY RX/DR IN RCRD: CPT | Mod: CPTII,,, | Performed by: INTERNAL MEDICINE

## 2025-03-25 PROCEDURE — 99215 OFFICE O/P EST HI 40 MIN: CPT | Mod: PBBFAC,PN | Performed by: INTERNAL MEDICINE

## 2025-03-25 PROCEDURE — 3046F HEMOGLOBIN A1C LEVEL >9.0%: CPT | Mod: CPTII,,, | Performed by: INTERNAL MEDICINE

## 2025-03-25 PROCEDURE — 3008F BODY MASS INDEX DOCD: CPT | Mod: CPTII,,, | Performed by: INTERNAL MEDICINE

## 2025-03-25 RX ORDER — OXYCODONE AND ACETAMINOPHEN 10; 325 MG/1; MG/1
1 TABLET ORAL EVERY 12 HOURS PRN
Qty: 40 TABLET | Refills: 0 | Status: SHIPPED | OUTPATIENT
Start: 2025-03-25

## 2025-03-25 NOTE — PROGRESS NOTES
"Subjective:       Patient ID: Audrey Natarajan is a 54 y.o. female.    Chief Complaint: Follow-up (3m f/u. Pt has c/o Lt shoulder pain x7 months)    Left shoulder/neck pain    HPI: 53 y/o IDDM HTN s/p left BKA bipolar disorder and ADHD presents alone for follow up. Has been dealing with left posteior shoulder pain x two motnhs seen in urgent care x three for this. No relief with IM steroid and systemic steroids recently switched from tramadol to oxycodone for acute pain with some improvement moving bowels withotu straining. Pain worse with abduction of lect shoulder or nexk extension no dysphagia no hand weakness no vision changes she denies any hypoglycemic symptoms A1c last month decreased from 13% to 11.3% no hypoglycemic symptoms       Review of Systems   Constitutional:  Negative for activity change, appetite change, fatigue, fever and unexpected weight change.   HENT:  Negative for ear pain, rhinorrhea and sore throat.    Eyes:  Negative for discharge and visual disturbance.   Respiratory:  Negative for chest tightness, shortness of breath and wheezing.    Cardiovascular:  Negative for chest pain, palpitations and leg swelling.   Gastrointestinal:  Negative for abdominal pain, constipation and diarrhea.   Endocrine: Negative for cold intolerance and heat intolerance.   Genitourinary:  Negative for dysuria and hematuria.   Musculoskeletal:  Positive for arthralgias and gait problem. Negative for joint swelling and neck stiffness.   Skin:  Negative for rash.   Neurological:  Negative for dizziness, syncope, weakness and headaches.   Psychiatric/Behavioral:  Negative for suicidal ideas.        Objective:     Vitals:    03/25/25 0816   BP: (!) 128/58   BP Location: Right arm   Patient Position: Sitting   Pulse: 93   Temp: 98.2 °F (36.8 °C)   TempSrc: Oral   SpO2: 96%   Weight: 99.8 kg (220 lb 0.3 oz)   Height: 5' 6" (1.676 m)          Physical Exam  Constitutional:       Appearance: She is well-developed. She " is obese. She is ill-appearing. She is not toxic-appearing or diaphoretic.   HENT:      Head: Normocephalic and atraumatic.   Eyes:      General: No scleral icterus.     Conjunctiva/sclera: Conjunctivae normal.   Cardiovascular:      Rate and Rhythm: Normal rate and regular rhythm.      Heart sounds: No murmur heard.     No friction rub. No gallop.   Pulmonary:      Effort: Pulmonary effort is normal.      Breath sounds: Normal breath sounds. No wheezing or rales.   Abdominal:      Palpations: Abdomen is soft.      Tenderness: There is no abdominal tenderness. There is no guarding or rebound.   Musculoskeletal:         General: No tenderness. Normal range of motion.      Cervical back: Normal range of motion.      Right lower leg: No edema.      Comments: Left BKA    Skin:     General: Skin is warm and dry.   Neurological:      Mental Status: She is alert and oriented to person, place, and time.      Cranial Nerves: No cranial nerve deficit.         Assessment and Plan   1. Essential hypertension (Primary)  Bp just at goal no adjustmetns  - CBC Without Differential; Future  - Comprehensive Metabolic Panel; Future    2. Type 2 diabetes mellitus with diabetic polyneuropathy, with long-term current use of insulin  On basal and pre meal insulin no logs to review today suspect repeated steroid use for shoulder pain worsening control continue semaglutide weekly to establish next month with endocrine to bring her meter to that appointment will get A1c prior to thi  - Comprehensive Metabolic Panel; Future  - Hemoglobin A1C; Future    3. Bipolar 1 disorder  Management per psychiatirst    4. Morbid obesity  The patient is asked to make an attempt to improve diet and exercise patterns to aid in medical management of this problem.      5. History of DVT (deep vein thrombosis)  Continue apixiban  - CBC Without Differential; Future    6. S/P BKA (below knee amputation) unilateral, left  As above    7. Screening for colorectal  cancer  Fit kit dispensed  - Fecal Immunochemical Test (iFOBT); Future  - Fecal Immunochemical Test (iFOBT)    8. Cervicalgia  See below    9. Cervical radiculopathy  To have ct of cervical spine this week and establish with ortho/pain management short follow up after specialist evaluation in interim refill oral pain medicaitons continue gabapentin  - oxyCODONE-acetaminophen (PERCOCET)  mg per tablet; Take 1 tablet by mouth every 12 (twelve) hours as needed for Pain.  Dispense: 40 tablet; Refill: 0

## 2025-03-26 DIAGNOSIS — E11.42 TYPE 2 DIABETES MELLITUS WITH DIABETIC POLYNEUROPATHY, WITHOUT LONG-TERM CURRENT USE OF INSULIN: Primary | Chronic | ICD-10-CM

## 2025-03-27 ENCOUNTER — HOSPITAL ENCOUNTER (OUTPATIENT)
Dept: RADIOLOGY | Facility: HOSPITAL | Age: 55
Discharge: HOME | End: 2025-03-27
Payer: MEDICAID

## 2025-03-27 DIAGNOSIS — M54.2 CERVICALGIA: ICD-10-CM

## 2025-03-27 PROCEDURE — 25500020 PHARM REV CODE 255

## 2025-03-27 PROCEDURE — 72126 CT NECK SPINE W/DYE: CPT | Mod: TC

## 2025-03-27 PROCEDURE — 72126 CT NECK SPINE W/DYE: CPT | Mod: 26,,, | Performed by: RADIOLOGY

## 2025-03-27 RX ADMIN — IOHEXOL 75 ML: 350 INJECTION, SOLUTION INTRAVENOUS at 04:03

## 2025-04-03 ENCOUNTER — TELEPHONE (OUTPATIENT)
Dept: FAMILY MEDICINE | Facility: CLINIC | Age: 55
End: 2025-04-03
Payer: MEDICAID

## 2025-04-03 ENCOUNTER — TELEPHONE (OUTPATIENT)
Dept: PODIATRY | Facility: CLINIC | Age: 55
End: 2025-04-03
Payer: MEDICAID

## 2025-04-03 DIAGNOSIS — Z89.512 S/P BKA (BELOW KNEE AMPUTATION) UNILATERAL, LEFT: ICD-10-CM

## 2025-04-03 RX ORDER — METHOCARBAMOL 500 MG/1
TABLET, FILM COATED ORAL
Qty: 45 TABLET | Refills: 1 | Status: SHIPPED | OUTPATIENT
Start: 2025-04-03

## 2025-04-03 NOTE — TELEPHONE ENCOUNTER
Last Office Visit Info:   The patient's last visit with Donaldo Pena MD was on 3/25/2025.    The patient's last visit in current department was on 3/25/2025.    Upcoming visit with ANG Ramos NP on 4/16/2025 and with Donaldo Pena MD 0n 07/23/2025.

## 2025-04-03 NOTE — TELEPHONE ENCOUNTER
----- Message from Kaela sent at 4/3/2025 10:58 AM CDT -----  Type: RX Refill Request Who Called:Self Have you contacted your pharmacy:No Refill or New Rx: Refill RX Name and Strength:methocarbamol 500 mg Preferred Pharmacy with phone number:.[id number] - [place] - [address] [facility] [address] AT [address] [address]Hbeit: [phone] Fax: [phone] Local or Mail Order: Would the patient rather a call back or a response via My [facility]?Call back  Best Call Back Number:.[phone]  Additional Information: Thank you.

## 2025-04-03 NOTE — TELEPHONE ENCOUNTER
No care due was identified.  [facility] Embedded Care Due Messages. Reference number: [id number].   4/03/2025 11:38:47 AM CDT

## 2025-04-03 NOTE — TELEPHONE ENCOUNTER
Lvm to inform pt [facility] power went out yesterday will need to change today appt to virtual or in want to be seen  in person will need to reschedule appt

## 2025-04-04 ENCOUNTER — PATIENT OUTREACH (OUTPATIENT)
Dept: ADMINISTRATIVE | Facility: HOSPITAL | Age: 55
End: 2025-04-04
Payer: MEDICAID

## 2025-04-04 NOTE — PROGRESS NOTES
Lab completed/ Not due at this time/Scheduled. Immunization's updated/triggered. Gap report updated.

## 2025-04-11 DIAGNOSIS — M54.12 CERVICAL RADICULOPATHY: ICD-10-CM

## 2025-04-11 RX ORDER — OXYCODONE AND ACETAMINOPHEN 10; 325 MG/1; MG/1
1 TABLET ORAL EVERY 12 HOURS PRN
Qty: 40 TABLET | Refills: 0 | Status: SHIPPED | OUTPATIENT
Start: 2025-04-11

## 2025-04-11 NOTE — TELEPHONE ENCOUNTER
----- Message from Med Assistant New sent at 4/11/2025  7:57 AM CDT -----  Type: RX Refill RequestWho Called: SelfHave you contacted your pharmacy: noRefill or New Rx:refill RX Name and Strength: States she has enough to last her until Sunday.. she would like this called in today please.. oxyCODONE-acetaminophen (PERCOCET)  mg per tabletPreferred Pharmacy with phone number: [facility] #[id number]  [place], [place] - [address] [facility] [address] [place] [place] [address] AT [facility] Phone: [phone]: [phone] or Mail Order:LocalOrdering Provider:Storm the patient rather a call back or a response via [facility]? Yes, call Best Call Back Number: [phone] (home)

## 2025-04-11 NOTE — TELEPHONE ENCOUNTER
No care due was identified.  [facility] Embedded Care Due Messages. Reference number: [id number].   4/11/2025 9:26:21 AM CDT

## 2025-04-16 ENCOUNTER — OFFICE VISIT (OUTPATIENT)
Dept: FAMILY MEDICINE | Facility: CLINIC | Age: 55
End: 2025-04-16
Payer: MEDICAID

## 2025-04-16 VITALS
SYSTOLIC BLOOD PRESSURE: 128 MMHG | HEART RATE: 81 BPM | TEMPERATURE: 98 F | HEIGHT: 66 IN | OXYGEN SATURATION: 97 % | WEIGHT: 220 LBS | DIASTOLIC BLOOD PRESSURE: 68 MMHG | BODY MASS INDEX: 35.36 KG/M2 | RESPIRATION RATE: 17 BRPM

## 2025-04-16 DIAGNOSIS — M25.512 CHRONIC LEFT SHOULDER PAIN: ICD-10-CM

## 2025-04-16 DIAGNOSIS — G89.29 CHRONIC LEFT SHOULDER PAIN: ICD-10-CM

## 2025-04-16 DIAGNOSIS — M54.2 CHRONIC NECK PAIN: Primary | ICD-10-CM

## 2025-04-16 DIAGNOSIS — I10 ESSENTIAL HYPERTENSION: Chronic | ICD-10-CM

## 2025-04-16 DIAGNOSIS — G89.29 CHRONIC NECK PAIN: Primary | ICD-10-CM

## 2025-04-16 DIAGNOSIS — Z79.4 UNCONTROLLED DIABETES MELLITUS WITH HYPERGLYCEMIA, WITH LONG-TERM CURRENT USE OF INSULIN: ICD-10-CM

## 2025-04-16 DIAGNOSIS — E11.65 UNCONTROLLED DIABETES MELLITUS WITH HYPERGLYCEMIA, WITH LONG-TERM CURRENT USE OF INSULIN: ICD-10-CM

## 2025-04-16 PROCEDURE — G2211 COMPLEX E/M VISIT ADD ON: HCPCS | Mod: S$PBB,,, | Performed by: NURSE PRACTITIONER

## 2025-04-16 PROCEDURE — 3074F SYST BP LT 130 MM HG: CPT | Mod: CPTII,,, | Performed by: NURSE PRACTITIONER

## 2025-04-16 PROCEDURE — 99215 OFFICE O/P EST HI 40 MIN: CPT | Mod: PBBFAC,PN | Performed by: NURSE PRACTITIONER

## 2025-04-16 PROCEDURE — 1159F MED LIST DOCD IN RCRD: CPT | Mod: CPTII,,, | Performed by: NURSE PRACTITIONER

## 2025-04-16 PROCEDURE — 3046F HEMOGLOBIN A1C LEVEL >9.0%: CPT | Mod: CPTII,,, | Performed by: NURSE PRACTITIONER

## 2025-04-16 PROCEDURE — 99999 PR PBB SHADOW E&M-EST. PATIENT-LVL V: CPT | Mod: PBBFAC,,, | Performed by: NURSE PRACTITIONER

## 2025-04-16 PROCEDURE — 3008F BODY MASS INDEX DOCD: CPT | Mod: CPTII,,, | Performed by: NURSE PRACTITIONER

## 2025-04-16 PROCEDURE — 3078F DIAST BP <80 MM HG: CPT | Mod: CPTII,,, | Performed by: NURSE PRACTITIONER

## 2025-04-16 PROCEDURE — 4010F ACE/ARB THERAPY RXD/TAKEN: CPT | Mod: CPTII,,, | Performed by: NURSE PRACTITIONER

## 2025-04-16 PROCEDURE — 1160F RVW MEDS BY RX/DR IN RCRD: CPT | Mod: CPTII,,, | Performed by: NURSE PRACTITIONER

## 2025-04-16 PROCEDURE — 99214 OFFICE O/P EST MOD 30 MIN: CPT | Mod: S$PBB,,, | Performed by: NURSE PRACTITIONER

## 2025-04-16 NOTE — PROGRESS NOTES
"Routine Office Visit    Patient Name: Audrey Natarajan    : 1970  MRN: [id number]    Chief Complaint:  Neck and shoulder pain    Subjective:  Audrey is a 54 y.o. female who presents today for:    Neck and shoulder pain - patient who is known to me with medical problems as documented below reports today for evaluation.  For the last 3 months she has been having left shoulder pain and left neck pain worse with certain motions such as turning her head to the left side and reaching her arm above her neck.  She denies any inciting event or injury for this.  The pain is aching and initially the pain was associated with some numbness of the arm but this has resolved.  Occasionally the pain radiates from the left side of the neck down to the hand and her hand sometimes feels swollen because of this.  She has had recent imaging of the shoulder showing AC joint arthrosis and calcified tendinitis.  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  She notes that the meloxicam does not help much.  Percocet does seem to help with the shoulder and neck pain.  She has been given steroids recently as well.  The Percocet is provided by her PCP.  She is trying to get an orthopedics but has not been able to make an appointment with [facility] until next month.    Past Medical History  Past Medical History:   Diagnosis Date    ADHD (attention deficit hyperactivity disorder)     Arthritis     Asthma     Bipolar 1 disorder     Cataract     Cigarette smoker     COPD (chronic obstructive pulmonary disease)     Coronary artery disease     A fib    Depression     bipolar manic depresson    Diabetes mellitus     Diabetic foot ulcers     Diabetic neuropathy     DVT of lower extremity, bilateral 2013    bilateral " LE DVT. Estelita filter placed.     Encounter for blood transfusion     History of blood clots 1. Left Leg=; 2.Bilateral Groin=Blood Clots=  or 2013 & 2013; 3. LLL of Lung=2013;  4. Lt. Lower Leg=2013.     Pt. had 1st Blood Clot after Ropsbuipykwz=9194, & Last=. Stem Filter= Rt.Lateral Neck.    HTN (hypertension) 2013    Pt states that she does not have hypertension    Hypercholesteremia     Irregular heartbeat     Neuromuscular disorder     neuropathy feet    Obese     PE (pulmonary embolism) 2013    bilat LE DVT.     Restless leg syndrome        Family History  Family History   Problem Relation Name Age of Onset    Hypertension Father      Diabetes Father      Heart disease Father      Cataracts Father      Diabetes Paternal Grandfather      Heart disease Paternal Grandfather      No Known Problems Mother      Ovarian cancer Maternal Grandmother           from this. ? age     No Known Problems Sister      No Known Problems Brother      No Known Problems Maternal Aunt      No Known Problems Maternal Uncle      No Known Problems Paternal Aunt      No Known Problems Paternal Uncle      No Known Problems Maternal Grandfather      Ovarian cancer Paternal Grandmother      Uterine cancer Neg Hx      Breast cancer Neg Hx      Colon cancer Neg Hx      Amblyopia Neg Hx      Blindness Neg Hx      Cancer Neg Hx      Glaucoma Neg Hx      Macular degeneration Neg Hx      Retinal detachment Neg Hx      Strabismus Neg Hx      Stroke Neg Hx      Thyroid disease Neg Hx         Current Medications  Medications Ordered Prior to Encounter[1]    Allergies   Review of patient's allergies indicates:   Allergen Reactions    Morphine Other (See Comments)     Patient had a psychotic episode after taking Morphine  Agitation, hallucinations  Other Reaction(s): Other (See Comments), Other (See Comments)    Patient had a psychotic episode after taking Morphine    Patient had a psychotic episode after  "taking Morphine Agitation, hallucinations    Penicillins Anaphylaxis     Tolerated cephalosporins in the past    Januvia [sitagliptin] Hives    Carbamazepine Other (See Comments)     hyponatremia  Other Reaction(s): Other (See Comments)    hyponatremia       Review of Systems (Pertinent positives)  Review of Systems   Constitutional: Negative.  Negative for chills and fever.   HENT: Negative.  Negative for congestion, sinus pain and sore throat.    Eyes: Negative.    Respiratory:  Negative for cough, shortness of breath and wheezing.    Cardiovascular:  Negative for chest pain, palpitations, orthopnea and claudication.   Gastrointestinal: Negative.  Negative for abdominal pain, diarrhea, nausea and vomiting.   Genitourinary: Negative.  Negative for dysuria, frequency and urgency.   Musculoskeletal:  Positive for joint pain and neck pain. Negative for back pain.   Skin: Negative.    Neurological: Negative.  Negative for dizziness, tingling (Initially then resolved), loss of consciousness and headaches.   Endo/Heme/Allergies: Negative.    Psychiatric/Behavioral: Negative.         /68 (BP Location: Right arm, Patient Position: Sitting)   Pulse 81   Temp 97.9 °F (36.6 °C) (Oral)   Resp 17   Ht 5' 6" (1.676 m)   Wt 99.8 kg (220 lb)   LMP 11/01/2011 (LMP Unknown) Comment: stated when she was 39  SpO2 97%   BMI 35.51 kg/m²     Physical Exam  Vitals reviewed.   Constitutional:       General: She is not in acute distress.     Appearance: Normal appearance. She is not ill-appearing, toxic-appearing or diaphoretic.   HENT:      Head: Normocephalic and atraumatic.   Cardiovascular:      Rate and Rhythm: Normal rate and regular rhythm.      Pulses: Normal pulses.      Heart sounds: Normal heart sounds.   Pulmonary:      Effort: Pulmonary effort is normal. No respiratory distress.      Breath sounds: Normal breath sounds. No wheezing.   Musculoskeletal:         General: No swelling or deformity.      Left shoulder: " Tenderness (Overlying deltoid) present. No swelling, deformity or bony tenderness. Decreased range of motion. Normal strength.      Cervical back: Normal range of motion. Pain with movement, spinous process tenderness and muscular tenderness (Left trapezius) present.      Comments: Left shoulder - significant pain with reaching behind back.  Minor pain noted with resisted external rotation and empty can testing   Skin:     General: Skin is warm and dry.      Capillary Refill: Capillary refill takes less than 2 seconds.   Neurological:      General: No focal deficit present.      Mental Status: She is alert and oriented to person, place, and time.   Psychiatric:         Mood and Affect: Mood normal.         Behavior: Behavior normal.            Assessment/Plan:  Audrey Natarajan is a 54 y.o. female who presents today for :    Audrey was seen today for follow-up.    Diagnoses and all orders for this visit:    Chronic neck pain  -     Ambulatory referral/consult to Pain Clinic; Future  -     Ambulatory Referral/Consult to Physical Therapy; Future    Reviewed recent imaging results with patient.  Will consult Pain Clinic for chronic neck pain.  She may need MRI of the cervical spine if she can tolerate this.  She is on multiple medications for this will avoid anymore systemic steroids.  She does have Percocet given per her PCP and she will continue this.  Consult PT for evaluation.    Chronic left shoulder pain  -     Ambulatory Referral/Consult to Physical Therapy; Future    [facility] for patient to call to get set up there.  Will consult physical therapy.  Potential causes including arthritis, rotator cuff tendinitis etc. continue Percocet per PCP and evaluation with PT recommended.    Uncontrolled diabetes mellitus with hyperglycemia, with long-term current use of insulin    Continue current medications.  Patient does have upcoming appointment with endocrinology.    Essential  hypertension    Controlled with current medications.  Continue.        This office note has been dictated.  This dictation has been generated using M-Modal Fluency Direct dictation; some phonetic errors may occur.          [1]   Current Outpatient Medications on File Prior to Visit   Medication Sig Dispense Refill    ADVAIR DISKUS 250-50 mcg/dose diskus inhaler INHALE 1 PUFF INTO THE LUNGS TWICE DAILY 60 each 2    albuterol (PROVENTIL/VENTOLIN HFA) 90 mcg/actuation inhaler INHALE 2 PUFFS INTO THE LUNGS EVERY 6 HOURS AS NEEDED FOR WHEEZING 6.7 g 2    albuterol-ipratropium (DUO-NEB) 2.5 mg-0.5 mg/3 mL nebulizer solution USE 3 ML VIA NEBULIZER EVERY 6 HOURS AS NEEDED FOR WHEEZING OR SHORTNESS OF BREATH OR RESCUE 270 mL 3    ammonium lactate (LAC-HYDRIN) 12 % lotion APPL Y ONCE TOPICALLY TWICE DAILY FOR 30 DAYS 225 g 0    apixaban (ELIQUIS) 5 mg Tab Take 1 tablet (5 mg total) by mouth 2 (two) times daily. 60 tablet 2    atorvastatin (LIPITOR) 40 MG tablet Take 1 tablet (40 mg total) by mouth once daily. 90 tablet 3    BIOFREEZE, MENTHOL, TOP Apply topically.      blood sugar diagnostic Strp To check BG three times daily, to use with insurance preferred meter 300 each 3    blood-glucose meter (TRUE METRIX GLUCOSE METER) Misc USE TO TEST BLOOD GLUCOSE THREE TIMES DAILY AS DIRECTED 1 each 0    bumetanide (BUMEX) 1 MG tablet Take 1 tablet (1 mg total) by mouth once daily. 90 tablet 3    cyanocobalamin (VITAMIN B-12) 1000 MCG tablet Take 100 mcg by mouth once daily.      diclofenac sodium (VOLTAREN) 1 % Gel Apply 2 g topically 2 (two) times daily. 100 g 3    diclofenac sodium (VOLTAREN) 1 % Gel Apply 2 g topically 4 (four) times daily as needed (bilateral hands and knees). 200 g 0    DUPIXENT  mg/2 mL PnIj Inject into the skin every 14 (fourteen) days.      ferrous sulfate 325 (65 FE) MG EC tablet Take 1 tablet (325 mg total) by mouth once daily. 30 tablet 0    fluconazole (DIFLUCAN) 150 MG Tab Take 1 tablet (150 mg  total) by mouth once daily. May take second tab po two days after first if symptoms persist 2 tablet 0    fluticasone propionate (FLONASE) 50 mcg/actuation nasal spray 1 spray (50 mcg total) by Each Nostril route once daily. 16 mL 0    gabapentin (NEURONTIN) 300 MG capsule Take 2 capsules (600 mg total) by mouth 3 (three) times daily. 180 capsule 2    hydrOXYzine HCL (ATARAX) 25 MG tablet Take 1 tablet (25 mg total) by mouth every 6 (six) hours as needed for itching or anxiety. 20 tablet 0    insulin aspart U-100 (NOVOLOG) 100 unit/mL (3 mL) InPn pen ADMINISTER 10 UNITS UNDER THE SKIN THREE TIMES DAILY 9 mL 1    insulin detemir U-100, Levemir, 100 unit/mL (3 mL) SubQ InPn pen Inject 15 Units into the skin 2 (two) times daily. 9 mL 1    lancets Misc To check BG three times daily, to use with insurance preferred meter 300 each 3    LIDOcaine (LIDODERM) 5 % UNWRAP AND APPLY 1 PATCH TO SKIN ONCE DAILY. REMOVE AND DISCARD AFTER 12 HOURS 15 patch 1    lisinopriL 10 MG tablet Take 1 tablet (10 mg total) by mouth every evening. 90 tablet 2    loratadine (CLARITIN) 10 mg tablet Take 1 tablet (10 mg total) by mouth once daily. 90 tablet 3    meloxicam (MOBIC) 7.5 MG tablet Take 1 tablet (7.5 mg total) by mouth once daily. 30 tablet 1    metFORMIN (GLUCOPHAGE) 1000 MG tablet Take 1 tablet (1,000 mg total) by mouth 2 (two) times daily with meals. 180 tablet 1    methocarbamoL (ROBAXIN) 500 MG Tab TAKE 1 TABLET(500 MG) BY MOUTH THREE TIMES DAILY as needed for back pain 45 tablet 1    metoprolol tartrate (LOPRESSOR) 25 MG tablet Take 1 tablet (25 mg total) by mouth 2 (two) times daily. 180 tablet 3    multivitamin Tab Take 1 tablet by mouth once daily. 30 tablet 2    mupirocin (BACTROBAN) 2 % ointment Apply topically 3 (three) times daily. Right nostril 30 g 0    nystatin (NYSTOP) powder APPLY TO ABDOMINAL AND BREAST SKIN FOLD TWICE DAILY 60 g 2    oxyCODONE-acetaminophen (PERCOCET)  mg per tablet Take 1 tablet by mouth  "every 12 (twelve) hours as needed for Pain. 40 tablet 0    pantoprazole (PROTONIX) 40 MG tablet Take 1 tablet (40 mg total) by mouth once daily. 90 tablet 3    pen needle, diabetic (BD ERIC 2ND GEN PEN NEEDLE) 32 gauge x 5/32" Ndle USE TO INJECT INSULIN FOUR TIMES DAILY AS DIRECTED 400 each 3    risperiDONE (RISPERDAL) 3 MG Tab Take 1 tablet (3 mg total) by mouth in the morning and 1 tablet (3 mg total) in the evening. Indications: Mood. 60 tablet 0    semaglutide (OZEMPIC) 2 mg/dose (8 mg/3 mL) PnIj Inject 2 mg into the skin every 7 days. 9 mL 1    vitamin E 1000 UNIT capsule Take 1,000 Units by mouth once daily.      VYVANSE 40 mg Cap Take 40 mg by mouth once daily.      aspirin 81 MG Chew Take 1 tablet (81 mg total) by mouth once daily. 30 tablet 0    divalproex (DEPAKOTE) 500 MG TbEC Take 1 tablet (500 mg total) by mouth every evening. 30 tablet 11    [DISCONTINUED] furosemide (LASIX) 20 MG tablet TAKE 1 TABLET(20 MG) BY MOUTH EVERY DAY 90 tablet 1    [DISCONTINUED] QUEtiapine (SEROQUEL) 200 MG Tab Take 1 tablet (200 mg total) by mouth before breakfast. 30 tablet 2     No current facility-administered medications on file prior to visit.     "

## 2025-04-17 ENCOUNTER — HOSPITAL ENCOUNTER (EMERGENCY)
Facility: HOSPITAL | Age: 55
Discharge: HOME | End: 2025-04-18
Attending: STUDENT IN AN ORGANIZED HEALTH CARE EDUCATION/TRAINING PROGRAM
Payer: MEDICAID

## 2025-04-17 DIAGNOSIS — G89.29 CHRONIC LEFT SHOULDER PAIN: ICD-10-CM

## 2025-04-17 DIAGNOSIS — R73.9 HYPERGLYCEMIA: Primary | ICD-10-CM

## 2025-04-17 DIAGNOSIS — R07.9 CHEST PAIN: ICD-10-CM

## 2025-04-17 DIAGNOSIS — M25.512 CHRONIC LEFT SHOULDER PAIN: ICD-10-CM

## 2025-04-17 DIAGNOSIS — G89.29 CHRONIC LEFT SHOULDER PAIN: Primary | ICD-10-CM

## 2025-04-17 DIAGNOSIS — M25.512 CHRONIC LEFT SHOULDER PAIN: Primary | ICD-10-CM

## 2025-04-17 DIAGNOSIS — R07.89 CHEST WALL PAIN: ICD-10-CM

## 2025-04-17 LAB
ALBUMIN SERPL-MCNC: 3.4 G/DL (ref 3.3–5.5)
ALLENS TEST: ABNORMAL
ALP SERPL-CCNC: 62 U/L (ref 42–141)
BILIRUB SERPL-MCNC: 0.5 MG/DL (ref 0.2–1.6)
BILIRUBIN, POC UA: NEGATIVE
BLOOD, POC UA: ABNORMAL
BUN SERPL-MCNC: 11 MG/DL (ref 7–22)
CALCIUM SERPL-MCNC: 9.9 MG/DL (ref 8–10.3)
CHLORIDE SERPL-SCNC: 96 MMOL/L (ref 98–108)
CLARITY, UA: CLEAR
COLOR, UA: YELLOW
CREAT SERPL-MCNC: 0.6 MG/DL (ref 0.6–1.2)
GLUCOSE SERPL-MCNC: 391 MG/DL (ref 73–118)
GLUCOSE, POC UA: ABNORMAL
HCO3 UR-SCNC: 32.7 MMOL/L (ref 24–28)
HCT, POC: NORMAL
HGB, POC: NORMAL (ref 14–18)
KETONES, POC UA: ABNORMAL
LDH SERPL L TO P-CCNC: 2.6 MMOL/L (ref 0.5–2.2)
LEUKOCYTE EST, POC UA: NEGATIVE
MCH, POC: NORMAL
MCHC, POC: NORMAL
MCV, POC: NORMAL
MPV, POC: NORMAL
NITRITE, POC UA: NEGATIVE
PCO2 BLDA: 48.7 MMHG (ref 35–45)
PH SMN: 7.43 [PH] (ref 7.35–7.45)
PH UR STRIP: 5.5 [PH] (ref 5–8)
PO2 BLDA: 35 MMHG (ref 40–60)
POC ALT (SGPT): 17 U/L (ref 10–47)
POC AST (SGOT): 24 U/L (ref 11–38)
POC B-TYPE NATRIURETIC PEPTIDE: 52.3 PG/ML (ref 0–100)
POC BE: 7 MMOL/L
POC CARDIAC TROPONIN I: 0 NG/ML (ref 0–0.08)
POC CARDIAC TROPONIN I: 0 NG/ML (ref 0–0.08)
POC D-DI: <100 NG/ML (ref 0–450)
POC PLATELET COUNT: NORMAL
POC SATURATED O2: 69 % (ref 95–100)
POC TCO2: 30 MMOL/L (ref 18–33)
POC TCO2: 34 MMOL/L (ref 24–29)
POTASSIUM BLD-SCNC: 4.8 MMOL/L (ref 3.6–5.1)
PROTEIN, POC UA: NEGATIVE
PROTEIN, POC: 6.5 G/DL (ref 6.4–8.1)
RBC, POC: NORMAL
RDW, POC: NORMAL
SAMPLE: ABNORMAL
SAMPLE: NORMAL
SAMPLE: NORMAL
SITE: ABNORMAL
SODIUM BLD-SCNC: 137 MMOL/L (ref 128–145)
SPECIFIC GRAVITY, POC UA: 1.01 (ref 1–1.03)
UROBILINOGEN, POC UA: 0.2 E.U./DL
WBC, POC: NORMAL

## 2025-04-17 PROCEDURE — 96361 HYDRATE IV INFUSION ADD-ON: CPT | Mod: ER

## 2025-04-17 PROCEDURE — 96360 HYDRATION IV INFUSION INIT: CPT | Mod: ER

## 2025-04-17 PROCEDURE — 93005 ELECTROCARDIOGRAM TRACING: CPT | Mod: ER

## 2025-04-17 PROCEDURE — 84484 ASSAY OF TROPONIN QUANT: CPT | Mod: ER

## 2025-04-17 PROCEDURE — 83880 ASSAY OF NATRIURETIC PEPTIDE: CPT | Mod: ER

## 2025-04-17 PROCEDURE — 99284 EMERGENCY DEPT VISIT MOD MDM: CPT | Mod: 25,ER

## 2025-04-17 PROCEDURE — 80053 COMPREHEN METABOLIC PANEL: CPT | Mod: ER

## 2025-04-17 PROCEDURE — 93010 ELECTROCARDIOGRAM REPORT: CPT | Mod: ,,, | Performed by: INTERNAL MEDICINE

## 2025-04-17 PROCEDURE — 85025 COMPLETE CBC W/AUTO DIFF WBC: CPT | Mod: ER

## 2025-04-17 PROCEDURE — 83605 ASSAY OF LACTIC ACID: CPT | Mod: ER

## 2025-04-17 PROCEDURE — 25000003 PHARM REV CODE 250: Mod: ER | Performed by: STUDENT IN AN ORGANIZED HEALTH CARE EDUCATION/TRAINING PROGRAM

## 2025-04-17 PROCEDURE — 82803 BLOOD GASES ANY COMBINATION: CPT | Mod: ER

## 2025-04-17 RX ORDER — LIDOCAINE 50 MG/G
1 PATCH TOPICAL DAILY
Qty: 30 PATCH | Refills: 0 | Status: SHIPPED | OUTPATIENT
Start: 2025-04-17

## 2025-04-17 RX ORDER — LIDOCAINE 50 MG/G
1 PATCH TOPICAL
Status: DISCONTINUED | OUTPATIENT
Start: 2025-04-17 | End: 2025-04-18 | Stop reason: HOSPADM

## 2025-04-17 RX ORDER — ASPIRIN 325 MG
325 TABLET ORAL
Status: COMPLETED | OUTPATIENT
Start: 2025-04-17 | End: 2025-04-17

## 2025-04-17 RX ORDER — METHOCARBAMOL 750 MG/1
1500 TABLET, FILM COATED ORAL
Status: COMPLETED | OUTPATIENT
Start: 2025-04-17 | End: 2025-04-17

## 2025-04-17 RX ORDER — METHOCARBAMOL 500 MG/1
1000 TABLET, FILM COATED ORAL EVERY 8 HOURS PRN
Qty: 30 TABLET | Refills: 0 | Status: SHIPPED | OUTPATIENT
Start: 2025-04-17 | End: 2025-04-22

## 2025-04-17 RX ADMIN — METHOCARBAMOL 1500 MG: 750 TABLET ORAL at 09:04

## 2025-04-17 RX ADMIN — SODIUM CHLORIDE 1000 ML: 9 INJECTION, SOLUTION INTRAVENOUS at 09:04

## 2025-04-17 RX ADMIN — LIDOCAINE 1 PATCH: 50 PATCH TOPICAL at 09:04

## 2025-04-17 RX ADMIN — ASPIRIN 325 MG ORAL TABLET 325 MG: 325 PILL ORAL at 08:04

## 2025-04-18 VITALS
HEART RATE: 73 BPM | BODY MASS INDEX: 35.36 KG/M2 | WEIGHT: 220 LBS | DIASTOLIC BLOOD PRESSURE: 60 MMHG | SYSTOLIC BLOOD PRESSURE: 130 MMHG | TEMPERATURE: 99 F | OXYGEN SATURATION: 94 % | RESPIRATION RATE: 16 BRPM | HEIGHT: 66 IN

## 2025-04-18 LAB
OHS QRS DURATION: 72 MS
OHS QRS DURATION: 76 MS
OHS QTC CALCULATION: 433 MS
OHS QTC CALCULATION: 450 MS

## 2025-04-18 NOTE — ED PROVIDER NOTES
Encounter Date: 4/17/2025    SCRIBE #1 NOTE: I, Zee Phillips, am scribing for, and in the presence of,  Pio Briceno MD. I have scribed the following portions of the note - Other sections scribed: HPI, ROS, PE.       History     Chief Complaint   Patient presents with    Chest Pain     Pt presents w/ a c/o of left sided cp radiating to the left arm onset since January. Reports having this pain before, and recommended to see a bone and joint doctor. Reports a calcium build up.     Audrey Natarajan is a 54 y.o. female, with a PMHx of arthritis, asthma, cigarette smoking, COPD, CAD, atrial fibrillation, diabetes mellitus, hypertension, hypercholesteremia, DVT, PE, who presents to the ED with left-sided chest pain onset three months ago, which worsened today. Patient was prompted to come to the ED today after developing a new onset of 9/10 left-sided, constant chest pain radiating to her left neck, left shoulder, and left arm. She also notes swelling to her left arm.Treatment with Percocet has not provided any significant relief. The last dose of Percocet was taken at 1700 today. Patient states she was seen by her PCP earlier today who attributed her symptoms to arthritis and advised she follow up with orthopedics, a joint specialist, and pain management for further evaluation. No other exacerbating or alleviating factors. Denies any diaphoresis, lower extremity edema, syncope or other associated symptoms.       The history is provided by the patient. No  was used.     Review of patient's allergies indicates:   Allergen Reactions    Morphine Other (See Comments)     Patient had a psychotic episode after taking Morphine  Agitation, hallucinations  Other Reaction(s): Other (See Comments), Other (See Comments)    Patient had a psychotic episode after taking Morphine    Patient had a psychotic episode after taking Morphine Agitation, hallucinations    Penicillins Anaphylaxis     Tolerated  cephalosporins in the past    Januvia [sitagliptin] Hives    Carbamazepine Other (See Comments)     hyponatremia  Other Reaction(s): Other (See Comments)    hyponatremia     Past Medical History:   Diagnosis Date    ADHD (attention deficit hyperactivity disorder)     Arthritis     Asthma     Bipolar 1 disorder     Cataract     Cigarette smoker     COPD (chronic obstructive pulmonary disease)     Coronary artery disease     A fib    Depression     bipolar manic depresson    Diabetes mellitus     Diabetic foot ulcers     Diabetic neuropathy     DVT of lower extremity, bilateral 07/2013    bilateral LE DVT. [place] filter placed.     Encounter for blood transfusion     History of blood clots 1. Left Leg=2003; 2.Bilateral Groin=Blood Clots= 5 or 6/ 2013 & 7/2013; 3. LLL of Lung=7/2013;  4. Lt. Lower Leg=7/2013.     Pt. had 1st Blood Clot after Xluiwjbivtdh=3704, & Last=2013. [place] Filter= Rt.Lateral Neck.    HTN (hypertension) 06/06/2013    Pt states that she does not have hypertension    Hypercholesteremia     Irregular heartbeat     Neuromuscular disorder     neuropathy feet    Obese     PE (pulmonary embolism) 07/2013    bilat LE DVT.     Restless leg syndrome      Past Surgical History:   Procedure Laterality Date    ABDOMINAL SURGERY  2010    gastric sleeve    BELOW KNEE AMPUTATION OF LOWER EXTREMITY Left 4/19/2023    Procedure: AMPUTATION, BELOW KNEE;  Surgeon: Gabe Munoz MD;  Location: [facility] OR;  Service: General;  Laterality: Left;  RN PREOP 4/11/2023    BILATERAL OOPHORECTOMY Bilateral 1/12/2015    CHOLECYSTECTOMY      DEBRIDEMENT OF FOOT Bilateral 5/10/2022    Procedure: DEBRIDEMENT, FOOT;  Surgeon: Maira De Los Santos DPM;  Location: [facility] OR;  Service: Podiatry;  Laterality: Bilateral;    DEBRIDEMENT OF FOOT Left 2/28/2023    Procedure: DEBRIDEMENT, FOOT,biopsy;  Surgeon: Maira De Los Santos DPM;  Location: [facility] OR;  Service: Podiatry;  Laterality: Left;  request misonix, wound VAC, possible neoxx     "Green' s filter Right 2012    Right Neck & Tunneled Down.    HERNIA REPAIR      "[place] of Hernias Repaires around th [place].", pt. states    INCISION AND DRAINAGE FOOT Left 2022    Procedure: INCISION AND DRAINAGE, FOOT;  Surgeon: Fahad Razo DPM;  Location: [facility] OR;  Service: Podiatry;  Laterality: Left;    LAPAROSCOPIC CHOLECYSTECTOMY N/A 9/10/2020    Procedure: CHOLECYSTECTOMY, LAPAROSCOPIC;  Surgeon: Montrell Gutierrez MD;  Location: [facility] OR;  Service: General;  Laterality: N/A;  RN PREOP ----COVID Negative      OVARIAN CYST REMOVAL  3/13/2014    ND REMOVAL OF OVARY/TUBE(S)      SPLENECTOMY, TOTAL  2003    TONSILLECTOMY      as a child    TYMPANOSTOMY TUBE PLACEMENT  1976    VEIN SURGERY      Lt leg     Family History   Problem Relation Name Age of Onset    Hypertension Father      Diabetes Father      Heart disease Father      Cataracts Father      Diabetes Paternal Grandfather      Heart disease Paternal Grandfather      No Known Problems Mother      Ovarian cancer Maternal Grandmother           from this. ? age     No Known Problems Sister      No Known Problems Brother      No Known Problems Maternal Aunt      No Known Problems Maternal Uncle      No Known Problems Paternal Aunt      No Known Problems Paternal Uncle      No Known Problems Maternal Grandfather      Ovarian cancer Paternal Grandmother      Uterine cancer Neg Hx      Breast cancer Neg Hx      Colon cancer Neg Hx      Amblyopia Neg Hx      Blindness Neg Hx      Cancer Neg Hx      Glaucoma Neg Hx      Macular degeneration Neg Hx      Retinal detachment Neg Hx      Strabismus Neg Hx      Stroke Neg Hx      Thyroid disease Neg Hx       Social History[1]  Review of Systems   Constitutional:  Negative for diaphoresis and fever.   HENT:  Negative for sore throat.    Respiratory:  Negative for cough and shortness of breath.    Cardiovascular:  Positive for chest pain (left-sided; radiating to left neck, left shoulder, " and left arm). Negative for leg swelling.   Gastrointestinal:  Negative for abdominal pain, diarrhea, nausea and vomiting.   Genitourinary:  Negative for dysuria and hematuria.   Musculoskeletal:  Negative for back pain.        Positive for left arm swelling   Skin:  Negative for rash.   Neurological:  Negative for syncope.       Physical Exam     Initial Vitals [04/17/25 1806]   BP Pulse Resp Temp SpO2   (!) 154/68 80 20 98.7 °F (37.1 °C) (!) 94 %      MAP       --         Physical Exam    Nursing note and vitals reviewed.  Constitutional: She appears well-developed and well-nourished. She is not diaphoretic. No distress.   HENT:   Head: Normocephalic and atraumatic.   Right Ear: External ear normal.   Left Ear: External ear normal.   Nose: Nose normal.   Eyes: Conjunctivae are normal. No scleral icterus.   Neck: Neck supple. No tracheal deviation present.   Normal range of motion.  Cardiovascular:  Normal rate, regular rhythm, normal heart sounds and intact distal pulses.     Exam reveals no gallop and no friction rub.       No murmur heard.  +2 radial pulses bilaterally.    Pulmonary/Chest: Breath sounds normal. No respiratory distress. She exhibits tenderness (reproducible left-sided chest wall TTP).   Abdominal: Abdomen is soft. Bowel sounds are normal. There is no abdominal tenderness.   Musculoskeletal:         General: No edema.      Cervical back: Normal range of motion and neck supple.      Comments: No upper extremity edema. No crepitus. No deformities. Pain with ROM of left shoulder.  Status post for front foot amputation left leg.       Neurological: She is alert and oriented to person, place, and time. GCS score is 15. GCS eye subscore is 4. GCS verbal subscore is 5. GCS motor subscore is 6.   Skin: Skin is warm and dry.   No jaundice. No ecchymosis.   Psychiatric: She has a normal mood and affect. Thought content normal.         ED Course   Procedures  Labs Reviewed   POCT CMP - Abnormal       Result  Value    Albumin, POC 3.4      Alkaline Phosphatase, POC 62      ALT (SGPT), POC 17      AST (SGOT), POC 24      POC BUN 11      Calcium, POC 9.9      POC Chloride 96 (*)     POC Creatinine 0.6      POC Glucose 391 (*)     POC Potassium 4.8      POC Sodium 137      Bilirubin, POC 0.5      POC TCO2 30      Protein, POC 6.5     POCT URINALYSIS W/O SCOPE - Abnormal    Glucose, UA 3+ (*)     Bilirubin, UA Negative      Ketones, UA 1+ (*)     Spec Grav UA 1.015      Blood, UA Trace-intact (*)     PH, UA 5.5      Protein, UA Negative      Urobilinogen, UA 0.2      Nitrite, UA Negative      Leukocytes, UA Negative      Color, UA POC Yellow      Clarity, UA, POC Clear     ISTAT PROCEDURE - Abnormal    POC PH 7.434      POC PCO2 48.7 (*)     POC PO2 35 (*)     POC HCO3 32.7 (*)     POC BE 7 (*)     POC SATURATED O2 69      POC Lactate 2.60 (*)     POC TCO2 34 (*)     Sample VENOUS      Site Other      Allens Test N/A     TROPONIN ISTAT    POC Cardiac Troponin I 0.00      Sample unknown     TROPONIN ISTAT    POC Cardiac Troponin I 0.00      Sample unknown     POCT CBC    Hematocrit        Hemoglobin        RBC        WBC        MCV        MCH, POC        MCHC        RDW-CV        Platelet Count, POC        MPV       POCT CMP   POCT TROPONIN   POCT B-TYPE NATRIURETIC PEPTIDE (BNP)   POCT URINALYSIS W/O SCOPE   POCT D DIMER   POCT B-TYPE NATRIURETIC PEPTIDE (BNP)    POC B-Type Natriuretic Peptide 52.3     POCT D DIMER    POC D-DI <100     POCT TROPONIN          Imaging Results              X-Ray Chest PA And Lateral (Final result)  Result time 04/17/25 20:22:15      Final result by Shalom Bro MD (04/17/25 20:22:15)                   Impression:      No acute cardiopulmonary process identified.      Electronically signed by: Shalom Bro MD  Date:    04/17/2025  Time:    20:22               Narrative:    EXAMINATION:  XR CHEST PA AND LATERAL    CLINICAL HISTORY:  Chest Pain;    TECHNIQUE:  PA and lateral views of the  chest were performed.    COMPARISON:  April 2024.    FINDINGS:  Cardiac silhouette is normal in size.  Lungs are symmetrically expanded.  No evidence of focal consolidative process, pneumothorax, or significant pleural effusion.  No acute osseous abnormality identified.                                       Medications   LIDOcaine 5 % patch 1 patch (1 patch Transdermal Patch Applied 4/17/25 2125)   aspirin tablet 325 mg (325 mg Oral Given 4/17/25 2013)   methocarbamoL tablet 1,500 mg (1,500 mg Oral Given 4/17/25 2125)   sodium chloride 0.9% bolus 1,000 mL 1,000 mL (0 mLs Intravenous Stopped 4/17/25 2320)     Medical Decision Making  Amount and/or Complexity of Data Reviewed  Labs: ordered. Decision-making details documented in ED Course.  Radiology: ordered. Decision-making details documented in ED Course.  ECG/medicine tests: ordered and independent interpretation performed. Decision-making details documented in ED Course.    Risk  OTC drugs.  Prescription drug management.            Scribe Attestation:   Scribe #1: I performed the above scribed service and the documentation accurately describes the services I performed. I attest to the accuracy of the note.        ED Course as of 04/17/25 2350   Thu Apr 17, 2025 1952 Differential Diagnosis includes, but is not limited to:  ACS/MI, PE, aortic dissection, pneumothorax, cardiac tamponade, pericarditis/myocarditis, pneumonia, infection/abscess, lung mass, trauma/fracture, costochondritis/pleurisy, MSK pain/contusion, GERD, biliary disease, pancreatitis, anemia  [CC]   2019 EKG: Rate 80, regular rhythm, sinus rhythm, intervals within normal limits, no ST elevations or depressions noted.  Rightward axis.  Similar to previous.  Interpreted by me, reviewed by me. [CC]   2020 Repeat EKG rate 88, regular rhythm, sinus rhythm, intervals within normal limits, similar to previous.  Interpreted by me, reviewed by me. [CC]   2155 POC PH: 7.434 [CC]   2243 POC D-DI: <100 [CC]    2348 MDM: 54-year-old presenting to the emergency department for evaluation of left-sided chest wall pain left shoulder pain.  Chronic left shoulder pain.  She has follow up with Orthopedics in the outpatient setting.  Chest x-ray is unremarkable without evidence of pleural effusion, pulmonary edema, pneumonia or pneumothorax.  D-dimer normal, doubt pulmonary embolism or DVT.  She was hyperglycemic.  Fluid resuscitated.  PH is normal.  No metabolic acidosis noted, doubt DKA.  Clinically does not appear to have DKA.  BNP is normal no pulmonary edema, doubt CHF exacerbation.  EKGs send a previous and nonischemic.  Troponins are normal, doubt ACS.  Heart score of 3.  I do not believe this to be cardiac chest pain.  Vitals are stable patient feels well after Robaxin lidocaine and aspirin in the emergency department.  Counseled on hyperglycemic with a precautions.  Plan for discharge.  Strict return precautions given. I believe patient is appropriate for discharge and continued outpatient evaulation/treatment.  I discussed with the patient/family the diagnosis, treatment plan, indications for return to the emergency department, and for expected follow-up. The patient/family verbalized an understanding. The patient/family  asked if there are any questions or concerns. We discuss the case, until all issues are addressed to the patient/family's satisfaction. Patient/family understands and is agreeable to the plan. Patient is stable and ready for discharge.   [CC]   2350 Clinically doubt aortic dissection, no chest pain radiating towards the back, differential and pulses or other concerning symptoms or signs. [CC]      ED Course User Index  [CC] Pio Briceno MD                           Clinical Impression:  Final diagnoses:  [R07.9] Chest pain  [R73.9] Hyperglycemia (Primary)  [R07.89] Chest wall pain  [M25.512, G89.29] Chronic left shoulder pain          ED Disposition Condition    Discharge Good          ED  Prescriptions       Medication Sig Dispense Start Date End Date Auth. Provider    methocarbamoL (ROBAXIN) 500 MG Tab Take 2 tablets (1,000 mg total) by mouth every 8 (eight) hours as needed (pain). 30 tablet 2025 Pio Briceno MD    LIDOcaine (LIDODERM) 5 % Place 1 patch onto the skin once daily. Remove & Discard patch within 12 hours or as directed by MD 30 patch 2025 -- Pio Briceno MD          Follow-up Information       Follow up With Specialties Details Why Contact Info    Donaldo Pena MD Internal Medicine, Wound Care Schedule an appointment as soon as possible for a visit in 3 days  [address] [facility] [facility] [address]  [phone]      [facility] ED Emergency Medicine Go to  If symptoms worsen [address] [facility] [address] [phone]    Your orthopedic surgeon  Schedule an appointment as soon as possible for a visit               I, Pio Briceno MD, personally performed the services described in this documentation. All medical record entries made by the scribe were at my direction and in my presence. I have reviewed the chart and agree that the record reflects my personal performance and is accurate and complete.      DISCLAIMER: This note was prepared with [employer] voice recognition transcription software. Garbled syntax, mangled pronouns, and other bizarre constructions may be attributed to that software system.       [1]   Social History  Tobacco Use    Smoking status: Former     Current packs/day: 0.00     Average packs/day: 0.5 packs/day for 37.0 years (18.5 ttl pk-yrs)     Types: Cigarettes     Start date: 1983     Quit date: 2020     Years since quittin.3    Smokeless tobacco: Current    Tobacco comments:     Enrolled in the [place] [employer] on 5/3/14 ([facility] Member ID # [id number]). Ambulatory referral to Smoking Cessation Program   Substance Use Topics    Alcohol use: No     Alcohol/week: 0.0 standard drinks of alcohol     Drug use: No        Pio Briceno MD  04/17/25 [id number]

## 2025-04-19 NOTE — PATIENT INSTRUCTIONS
Putting on Compression Stockings     Turn the stocking inside-out, then fit it over your toes and heel.          Roll the stocking up your leg.            Once stockings are on, make sure the top of the stocking is about two fingers width below the crease of the knee (or the groin if you wear thigh-high stockings).          Use equipment, such as a stocking melisa, or wear rubber gloves to make it easier to put on compression stockings.         Elastic compression stockings are prescribed to treat many vein problems. Wearing them may be the most important thing you do to manage your symptoms. The stockings fit tightly around your ankle, gradually reducing in pressure as they go up your legs. This helps keep blood flowing to your heart. As a result, swelling is reduced. Your healthcare provider will prescribe stockings at a safe pressure for you. He or she will also tell you how often to wear and remove the stockings. Follow these instructions closely. Also, do not buy or wear compression stockings without first seeing your healthcare provider.  Tips for wear and care  To wear stockings safely and to get the most benefit:  · Wear the length prescribed by your healthcare provider.  · Pull them to the designated height and no farther. Dont let them bunch at the top. This can restrict blood flow and increase swelling.  · Wear the stockings for the amount of time your healthcare provider recommends. Replace them when they start to feel loose. This will likely be every 3 to 6 months.  · Remove them as your healthcare provider directs. When removed, wash your legs. Then check your legs and feet for sores. Call your healthcare provider if you find a sore. Dont put the stockings back on unless your healthcare provider directs.   · Wash the stockings as instructed. They may need to be hand-washed.  Date Last Reviewed: 5/1/2016  © 8045-2977 [employer]. [address] [place] [address], [place] [address]. All rights  [facility]  Follow up note    Bruce R Salus Patient Status:  Observation    10/31/1946 MRN [id number]   Location [facility] 3NW-A Attending Suleman Tejada MD   Hosp Day # 4 PCP Arlene Bruce MD     Interval history--  Patient notes that his abdomen feels slightly more distended but soft and not very painful, he's concerned waiting until Monday for Pleur-Ex.      Reason for Consultation:  Hx of cholangiocarcinoma    History of Present Illness:  Bruce R Salus is a a(n) 78 year old male that presented to ED yesterday with urinary retention.  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   Had 10fr x 12 cm plastic biliary stent placed 3/24/2025.     Admitted with urinary retention, noted to have ascites and BRPBR.  Primary oncologist is Dr. Cassidy.  On Xarelto for port associated VTE.    During admission--seen by Urology.  Had cystoscopy and placement of FC over wire yesterday.    Seen by GI.  Ultrasound showed moderate degress ascites.   Paracentesis planned today, Xarelto held for this.    Per Dr. 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  Transition to Imfinzi monotherapy 2023-2024  Progression noted on MRI  FOLFOX 7/15/2024- 2025   FOLFIRI started     History:  Past Medical History[1]  Past Surgical History[2]  Family History[3]   reports that he quit smoking about 44 years ago. His smoking use included cigarettes. He started smoking about 52 years ago. He  has a 1.6 pack-year smoking history. He has never used smokeless tobacco. He reports that he does not currently use alcohol after a past usage of about 0.8 standard drinks of alcohol per week. He reports that he does not currently use drugs after having used the following drugs: Cannabis.    Allergies:  Allergies[4]    Medications:  Current Hospital Medications[5]    Review of Systems:  A 10-point review of systems was done with pertinent positives and negatives per the HPI.    Physical Exam:   Blood pressure 113/63, pulse 67, temperature 97.7 °F (36.5 °C), temperature source Oral, resp. rate 18, height 5' 11\" (1.803 m), weight 200 lb 13.4 oz (91.1 kg), SpO2 99%.   General: Patient is alert and oriented x 3, not in acute distress.   HEENT: EOMs intact. PERRL. Oropharynx is clear.    Neck: No JVD. No palpable lymphadenopathy. Neck is supple.   Chest: Clear to auscultation.   Heart: Regular rate and rhythm.    Abdomen: Soft, non tender with good bowel sounds.   Extremities: Pedal pulses are present. No edema.   Neurological: Grossly intact.    Lymphatics: There is no palpable lymphadenopathy throughout in the cervical,            supraclavicular, axillary, or inguinal regions.       Laboratory Data:    Lab Results   Component Value Date    MG 1.6 04/19/2025     Lab Results   Component Value Date    WBC 5.1 04/17/2025    HGB 9.8 (L) 04/17/2025    HCT 30.6 (L) 04/17/2025    .0 04/17/2025    NEPERCENT 83.4 04/15/2025    LYPERCENT 6.5 04/15/2025    MOPERCENT 9.5 04/15/2025    EOPERCENT 0.1 04/15/2025    BAPERCENT 0.2 04/15/2025    NE 7.86 (H) 04/15/2025    LYMABS 0.61 (L) 04/15/2025    MOABSO 0.90 04/15/2025    EOABSO 0.01 04/15/2025    BAABSO 0.02 04/15/2025          Imaging:  US PARACENTESIS W IMAGING (CPT=49083)  Result Date: 4/17/2025  CONCLUSION:  Ultrasound-guided paracentesis was performed without complication.   LOCATION:  [facility]     Dictated by (CST): Kelvin Trinh DO on 4/17/2025 at 5:21 PM     Finalized  reserved. This information is not intended as a substitute for professional medical care. Always follow your healthcare professional's instructions.        Understanding Chronic Venous Insufficiency  Problems with the veins in the legs may lead to chronic venous insufficiency (CVI). CVI means that there is a long-term problem with the veins not being able to pump blood back to your heart. When this happens, blood stays in the legs and causes swelling and aching.   Two problems that may lead to chronic venous insufficiency are:  · Damaged valves. Valves keep blood flowing from the legs through the blood vessels and back to the heart. When the valves are damaged, blood does not flow as well.   · Deep vein thrombosis (DVT). Blood clots may form in the deep veins of the legs. This may cause pain, redness, and swelling in the legs. It may also block the flow of blood back to the heart. Seek immediate medical care if you have these symptoms.  · A blood clot in the leg can also break off and travel to the lungs. This is called pulmonary embolism (PE). In the lungs, the clot can cut off the flow of blood. This may cause chest pain, trouble breathing, sweating, a fast heartbeat, coughing (may cough up blood), and fainting. It is a medical emergency and may cause death. Call 911 if you have these symptoms.  · Healthcare providers call the two conditions, DVT and PE, venous thromboembolism (VTE).  CVI cant be cured, but you can control leg swelling to reduce the likelihood of ulcers (sores).  Recognizing the symptoms  Be aware of the following:  · If you stand or sit with your feet down for long periods, your legs may ache or feel heavy.  · Swollen ankles are possibly the most common symptom of CVI.  · As swelling increases, the skin over your ankles may show red spots or a brownish tinge. The skin may feel leathery or scaly, and may start to itch.  · If swelling is not controlled, an ulcer (open wound) may form.  What you  by (CST): Kelvin Trinh DO on 4/17/2025 at 5:22 PM       CT ABDOMEN+PELVIS(CONTRAST ONLY)(CPT=74177)  Result Date: 4/16/2025  CONCLUSION:  Interval development of omental carcinomatosis with large degree of abdominal and pelvic ascites when compared to 7/11/2024 CT scan.  There are now small bilateral pleural effusions with bibasilar atelectasis.  There is a heterogeneous infiltrating process in the left lobe of the liver with biliary ductal prominence without significant change.  Common bile duct stent in place.  Pancreatic ductal stent is no longer appreciated.  Stable CT appearance of the kidneys with bilateral cysts and nonobstructing calculi.   LOCATION:  [facility]   Dictated by (CST): Cally Frye MD on 4/16/2025 at 4:48 PM     Finalized by (CST): Cally Frye MD on 4/16/2025 at 4:59 PM       US PARACENTESIS W IMAGING (CPT=49083)  Result Date: 4/15/2025  CONCLUSION:  Ultrasound-guided paracentesis was performed without complication.   LOCATION:  [facility]     Dictated by (CST): Smooth Purcell MD on 4/15/2025 at 2:34 PM     Finalized by (CST): Smooth Purcell MD on 4/15/2025 at 2:35 PM       US ABDOMEN LIMITED (CPT=76705)  Result Date: 4/14/2025  CONCLUSION:  Moderate degree of ascites.   LOCATION:  [facility]   Dictated by (CST): Cally Frye MD on 4/14/2025 at 3:48 PM     Finalized by (CST): Cally Frye MD on 4/14/2025 at 3:49 PM       XR ENDO BILE DUCT (CPT=74328)  Result Date: 2/24/2025  CONCLUSION:  See above.   LOCATION:  [id number]   Dictated by (CST): Bro Feng MD on 2/24/2025 at 12:59 PM     Finalized by (CST): Bro Feng MD on 2/24/2025 at 1:00 PM          Impression and Plan:    Problem List[6]    Impression  Urinary retention--has been seen by Urology, FC remains in place.  Ascites--recurrent post paracentesis earlier this week--4L and again yesterday x 5 L.  Cytology negative.  Hx of metastatic cholangiocarcinoma  Anemia 9.9 grams.  Treatment and underlying disease related.  Normal WBC and platelet  can do  Reduce your risk of developing ulcers by doing the following:  · Increase blood flow back to your heart by elevating your legs, exercising daily, and wearing elastic stockings.  · Boost blood flow in your legs by losing excess weight.  · If you must stand or sit in one place for a period of time, keep your blood moving by wiggling your toes, shifting your body position, and rising up on the balls of your feet.    Date Last Reviewed: 5/1/2016 © 2000-2017 [employer]. [address] [place] [address], [place] [address]. All rights reserved. This information is not intended as a substitute for professional medical care. Always follow your healthcare professional's instructions.        Tips for Using Less Salt    Most people with heart problems need to eat less salt (sodium). Reducing the amount of salt you eat may help control your blood pressure. The higher your blood pressure, the greater your risk for heart disease, stroke, blindness, and kidney problems.  At the store  · Make low-salt choices by reading labels carefully. Look for the total amount of sodium per serving.  · Use more fresh food. Buy more fruits and vegetables. Select lean meats, fish, and poultry.  · Use fewer frozen, canned, and packaged foods which often contain a lot of sodium.  · Use plain frozen vegetables without sauces or toppings. These products are often low- or no-sodium.  · Opt for reduced-sodium or no-salt-added versions of canned vegetables and soups.  In the kitchen  · Don't add salt to food when you're cooking. Season with flavorings such as onion, garlic, pepper, salt-free herbal blends, and lemon or lime juice.  · Use a cookbook containing low-salt recipes. It can give you ideas for tasty meals that are healthy for your heart.  · Sprinkle salt-free herbal blends on vegetables and meat.  · Drain and rinse canned foods, such as canned beans and vegetables, before cooking or eating.  Eating out  · Tell the  you're on a  count.  Port associated VTE, on Xarelto at home  Recurrent cholangitis  Last cycle of chemotherapy with FOLFIRI given about 2 weeks ago.  Has been seen by both Urology and GI during admission  Repeat therapeutic paracentesis done yesterday.  We reviewed he may need an abdominal catheter placed for this soon.   He wishes to hold on this for now.      Plan  - hold further chemotherapy  - likely Pleur-Ex on Monday with drainage and repeat labwork to see if cytology is present or if SBP has resolved  - if worsening, may require repeat paracentesis prior  - IV antibiotics ongoing for now      Davis Sweeney MD           [1]   Past Medical History:   Anxiety state    BPH (benign prostatic hyperplasia)    Calculus of kidney    Cancer (HCC)    bile duct cancer    Chemotherapy-induced nausea    Cholangiocarcinoma (HCC)    Deep vein thrombosis (HCC)    port cath had clot    Depression    Esophageal reflux    High blood pressure    High cholesterol    History of recent hospitalization    10 days per pt at [facility]- had high fever, found sepsis - treated w antibiotics/ no fever now    Hypercholesterolemia    HYPERTENSION    Hypertension    Kidney disease    LFT elevation    Metastasis to peritoneal cavity (HCC)    Moderate episode of recurrent major depressive disorder (HCC)    KAREN (obstructive sleep apnea)    mild, AHI 10, O2 jas 84%, AutoPAP 8-20    Other and unspecified hyperlipidemia    OTHER DISEASES    pneumonia after flu    Personal history of antineoplastic chemotherapy    last infusion.   JANUARY 22TH 2025    Pneumonia    Pneumonia due to organism    12 YEARS AGO    Reflux    Sleep apnea    cpap    Unspecified essential hypertension    Visual impairment    GLASSES   [2]   Past Surgical History:  Procedure Laterality Date    Cholecystectomy      Closed rx nose fx w stabilizatn  03/21/2013    Procedure: CLOSED REDUCTION NASAL FRACTURE;  Surgeon: Lianna Luna MD;  Location: [facility]    Colonoscopy N/A  low-salt diet. Ask questions about the menu.  · Order fish, chicken, and meat broiled, baked, poached, or grilled without salt, butter, or breading.  · Use lemon, pepper, and salt-free herb mixes to add flavor.  · Choose plain steamed rice, boiled noodles, and baked or boiled potatoes. Top potatoes with chives and a little sour cream.     Beware! Salt goes by many other names. Limit foods with these words listed as ingredients: salt, sodium, soy sauce, baking soda, baking powder, MSG, monosodium, Na (the chemical symbol for sodium). Some antacids are also high in salt.   Date Last Reviewed: 6/19/2015 © 2000-2017 [employer]. [address] [place] [address]. All rights reserved. This information is not intended as a substitute for professional medical care. Always follow your healthcare professional's instructions.        Low-Salt Diet  This diet removes foods that are high in salt. It also limits the amount of salt you use when cooking. It is most often used for people with high blood pressure, edema (fluid retention), and kidney, liver, or heart disease.  Table salt contains the mineral sodium. Your body needs sodium to work normally. But too much sodium can make your health problems worse. Your healthcare provider is recommending a low-salt (also called low-sodium) diet for you. Your total daily allowance of salt is 1,500 to 2,300 milligrams (mg). It is less than 1 teaspoon of table salt. This means you can have only about 500 to 700 mg of sodium at each meal. People with certain health problems should limit salt intake to the lower end of the recommended range.    When you cook, dont add much salt. If you can cook without using salt, even better. Dont add salt to your food at the table.  When shopping, read food labels. Salt is often called sodium on the label. Choose foods that are salt-free, low salt, or very low salt. Note that foods with reduced salt may not lower your salt intake  08/24/2021    Procedure: COLONOSCOPY, with polypectomy;  Surgeon: Gaurav Cruz MD;  Location: [facility]    Colonoscopy & polypectomy  10/07/2015    a small polyp was removed; ASC    Colonoscopy,remv lesn,snare N/A 10/07/2015    Procedure: COLONOSCOPY, POSSIBLE BIOPSY, POSSIBLE POLYPECTOMY 43076;  Surgeon: Gaurav Cruz MD;  Location: [facility]    Cystoscopy,insert ureteral stent  04/08/2013    Procedure: CYSTOSCOPY/URETEROSCOPY/STONE EXTRACTION;  Surgeon: Eduin Luna MD;  Location: [facility]    Cystouretero w/lithotripsy  04/08/2013    Procedure: LITHOTRIPSY HOLMIUM LASER WITH CYSTOSCOPY;  Surgeon: Eduin Luna MD;  Location: [facility]    Cystourethroscopy      Cystouretro w/stone remove  04/08/2013    Procedure: CYSTO, WITH INSERTION OF STENT;  Surgeon: Eduin Luna MD;  Location: [facility]    Ercp,diagnostic  10/2024    Other surgical history      KIDNEY STONE REMOVED     Other surgical history      excision of left renal cyst     Other surgical history  11/15/2012    Flow US, RBUS    Other surgical history  12/05/2013    flow us- DR. Wilson    Other surgical history  2016    Cystoscopy    Skin surgery  09/15/2020    MMS-SCC-Left ear-Dr. RACHEL Hull     X-ray retrograde pyelogram  04/08/2013    Procedure: CYSTOSCOPY/URETEROSCOPY/STONE EXTRACTION;  Surgeon: Eduin Luna MD;  Location: [facility]   [3]   Family History  Problem Relation Age of Onset    Other (Other) Father         sepsis    Cancer Mother         lymphoma    Cancer Sister         thyroid CA   [4]   Allergies  Allergen Reactions    Chlorhexidine RASH     Clarissa body wipes   [5]   Current Facility-Administered Medications:     morphINE PF 2 MG/ML injection 1 mg, 1 mg, Intravenous, Q2H PRN **OR** morphINE PF 2 MG/ML injection 2 mg, 2 mg, Intravenous, Q2H PRN **OR** morphINE PF 4 MG/ML injection 4 mg, 4 mg, Intravenous, Q2H PRN    tiZANidine (Zanaflex) partial  enough.    Beans, potatoes, and pasta  Ok: Dry beans, split peas, lentils, potatoes, rice, macaroni, pasta, spaghetti without added salt  Avoid: Potato chips, tortilla chips, and similar products  Breads and cereals  Ok: Low-sodium breads, rolls, cereals, and cakes; low-salt crackers, matzo crackers  Avoid: Salted crackers, pretzels, popcorn, Lao toast, pancakes, muffins  Dairy  Ok: Milk, chocolate milk, hot chocolate mix, low-salt cheeses, and yogurt  Avoid: Processed cheese and cheese spreads; Roquefort, Camembert, and cottage cheese; buttermilk, instant breakfast drink  Desserts  Ok: Ice cream, frozen yogurt, juice bars, gelatin, cookies and pies, sugar, honey, jelly, hard candy  Avoid: Most pies, cakes and cookies prepared or processed with salt; instant pudding  Drinks  Ok: Tea, coffee, fizzy (carbonated) drinks, juices  Avoid: Flavored coffees, electrolyte replacement drinks, sports drinks  Meats  Ok: All fresh meat, fish, poultry, low-salt tuna, eggs, egg substitute  Avoid: Smoked, pickled, brine-cured, or salted meats and fish. This includes chavez, chipped beef, corned beef, hot dogs, deli meats, ham, kosher meats, salt pork, sausage, canned tuna, salted codfish, smoked salmon, herring, sardines, or anchovies.  Seasonings and spices  Ok: Most seasonings are okay. Good substitutes for salt include: fresh herb blends, hot sauce, lemon, garlic, wilcox, vinegar, dry mustard, parsley, cilantro, horseradish, tomato paste, regular margarine, mayonnaise, unsalted butter, cream cheese, vegetable oil, cream, low-salt salad dressing and gravy.  Avoid: Regular ketchup, relishes, pickles, soy sauce, teriyaki sauce, Worcestershire sauce, BBQ sauce, tartar sauce, meat tenderizer, chili sauce, regular gravy, regular salad dressing, salted butter  Soups  Ok: Low-salt soups and broths made with allowed foods  Avoid: Bouillon cubes, soups with smoked or salted meats, regular soup and broth  Vegetables  Ok: Most vegetables  are okay; also low-salt tomato and vegetable juices  Avoid: Sauerkraut and other brine-soaked vegetables; pickles and other pickled vegetables; tomato juice, olives  Date Last Reviewed: 8/1/2016 © 2000-2017 [employer]. [address] [place] [address]. All rights reserved. This information is not intended as a substitute for professional medical care. Always follow your healthcare professional's instructions.        Low-Salt Choices  Eating salt (sodium) can make your body retain too much water. Excess water makes your heart work harder. Canned, packaged, and frozen foods are easy to prepare, but they are often high in sodium. Here are some ideas for low-salt foods you can easily prepare yourself.    For breakfast  · Fruit or 100% fruit juice  · Whole-wheat bread or an English muffin. Compare sodium content on labels.  · Low-fat milk or yogurt  · Unsalted eggs  · Shredded wheat  · Corn tortillas  · Unsalted steamed rice  · Regular (not instant) hot cereal, made without salt  Stay away from:  · Sausage, chavez, and ham  · Flour tortillas  · Packaged muffins, pancakes, and biscuits  · Instant hot cereals  · Cottage cheese  For lunch and dinner  · Fresh fish, chicken, turkey, or meat--baked, broiled, or roasted without salt  · Dry beans, cooked without salt  · Tofu, stir-fried without salt  · Unsalted fresh fruit and vegetables, or frozen or canned fruit and vegetables with no added salt  Stay away from:  · Lunch or deli meat that is cured or smoked  · Cheese  · Tomato juice and catsup  · Canned vegetables, soups, and fish not labeled as no-salt-added or reduced sodium  · Packaged gravies and sauces  · Olives, pickles, and relish  · Bottled salad dressings  For snacks and desserts  · Yogurt  · Unsalted, air popped popcorn  · Unsalted nuts or seeds  Stay away from:  · Pies and cakes  · Packaged dessert mixes  · Pizza  · Canned and packaged puddings  · Pretzels, chips, crackers, and nuts--unless  tab 1 mg, 1 mg, Oral, Q6H PRN    traMADol (Ultram) tab 50 mg, 50 mg, Oral, Q6H PRN    HYDROcodone-acetaminophen (Norco) 5-325 MG per tab 1 tablet, 1 tablet, Oral, Q6H PRN    docusate sodium (Colace) cap 100 mg, 100 mg, Oral, BID    polyethylene glycol (PEG 3350) (Miralax) 17 g oral packet 17 g, 17 g, Oral, Daily PRN    bisacodyl (Dulcolax) 10 MG rectal suppository 10 mg, 10 mg, Rectal, Daily PRN    rivaroxaban (Xarelto) tab 20 mg, 20 mg, Oral, Daily with food    lisinopril (Prinivil; Zestril) tab 20 mg, 20 mg, Oral, Daily    cefTRIAXone (Rocephin) 2 g in sodium chloride 0.9% 100 mL IVPB-ADDV, 2 g, Intravenous, Q24H    acetaminophen (Tylenol Extra Strength) tab 500-1,000 mg, 500-1,000 mg, Oral, Q4H PRN    pantoprazole (Protonix) DR tab 20 mg, 20 mg, Oral, QAM AC    tamsulosin (Flomax) cap 0.4 mg, 0.4 mg, Oral, BID    atorvastatin (Lipitor) tab 10 mg, 10 mg, Oral, Nightly    hydrocortisone (Anusol-HC) 2.5 % rectal cream, , Rectal, BID PRN    venlafaxine ER (Effexor-XR) 24 hr cap 37.5 mg, 37.5 mg, Oral, Nightly  [6]   Patient Active Problem List  Diagnosis    Essential hypertension    KAREN on CPAP    History of kidney stones    Aorto-iliac atherosclerosis    BPH without obstruction/lower urinary tract symptoms    Elevated prostate specific antigen (PSA)    Moderate episode of recurrent major depressive disorder (HCC)    BMI 28.0-28.9,adult    History of nonmelanoma skin cancer    Urinary retention      the label says unsalted  Date Last Reviewed: 6/17/2015  © 7156-9454 [employer]. [address] [place] [address], [place] [address]. All rights reserved. This information is not intended as a substitute for professional medical care. Always follow your healthcare professional's instructions.

## 2025-04-23 ENCOUNTER — TELEPHONE (OUTPATIENT)
Dept: FAMILY MEDICINE | Facility: CLINIC | Age: 55
End: 2025-04-23
Payer: MEDICAID

## 2025-04-23 ENCOUNTER — OFFICE VISIT (OUTPATIENT)
Dept: PODIATRY | Facility: CLINIC | Age: 55
End: 2025-04-23
Payer: MEDICAID

## 2025-04-23 VITALS — HEIGHT: 66 IN | WEIGHT: 220 LBS | BODY MASS INDEX: 35.36 KG/M2

## 2025-04-23 DIAGNOSIS — M20.40 HAMMER TOE, UNSPECIFIED LATERALITY: ICD-10-CM

## 2025-04-23 DIAGNOSIS — B35.1 ONYCHOMYCOSIS DUE TO DERMATOPHYTE: ICD-10-CM

## 2025-04-23 DIAGNOSIS — E11.49 TYPE II DIABETES MELLITUS WITH NEUROLOGICAL MANIFESTATIONS: Primary | ICD-10-CM

## 2025-04-23 PROCEDURE — 99999 PR PBB SHADOW E&M-EST. PATIENT-LVL IV: CPT | Mod: PBBFAC,,, | Performed by: PODIATRIST

## 2025-04-23 PROCEDURE — 99214 OFFICE O/P EST MOD 30 MIN: CPT | Mod: PBBFAC,PO | Performed by: PODIATRIST

## 2025-04-23 PROCEDURE — 11721 DEBRIDE NAIL 6 OR MORE: CPT | Mod: PBBFAC,PO | Performed by: PODIATRIST

## 2025-04-23 NOTE — PROGRESS NOTES
Subjective:     Patient ID: Audrey Natarajan is a 54 y.o. female.    Chief Complaint: Diabetes Mellitus (03/25/25 Dr Pena )    Audrey is a 54 y.o. female who presents to the clinic for evaluation and treatment of high risk feet. Audrey has a past medical history of ADHD (attention deficit hyperactivity disorder), Arthritis, Asthma, Bipolar 1 disorder, Cataract, Cigarette smoker, COPD (chronic obstructive pulmonary disease), Coronary artery disease, Depression, Diabetes mellitus, Diabetic foot ulcers, Diabetic neuropathy, DVT of lower extremity, bilateral (07/2013), Encounter for blood transfusion, History of blood clots (1. Left Leg=2003; 2.Bilateral Groin=Blood Clots= 5 or 6/ 2013 & 7/2013; 3. LLL of Lung=7/2013;  4. Lt. Lower Leg=7/2013. ), HTN (hypertension) (06/06/2013), Hypercholesteremia, Irregular heartbeat, Neuromuscular disorder, Obese, PE (pulmonary embolism) (07/2013), and Restless leg syndrome. The patient's chief complaint is long, thick toenails. This patient has documented high risk feet requiring routine maintenance secondary to peripheral neuropathy.    PCP: Donaldo Pena MD    Date Last Seen by PCP: per above    Current shoe gear:  Affected Foot: Tennis shoes     Unaffected Foot: Tennis shoes    Hemoglobin A1C   Date Value Ref Range Status   02/10/2025 11.4 (H) 4.0 - 5.6 % Final     Comment:     ADA Screening Guidelines:  5.7-6.4%  Consistent with prediabetes  >or=6.5%  Consistent with diabetes    High levels of fetal hemoglobin interfere with the HbA1C  assay. Heterozygous hemoglobin variants (HbS, HgC, etc)do  not significantly interfere with this assay.   However, presence of multiple variants may affect accuracy.     10/11/2024 13.3 (H) 4.0 - 5.6 % Final     Comment:     ADA Screening Guidelines:  5.7-6.4%  Consistent with prediabetes  >or=6.5%  Consistent with diabetes    High levels of fetal hemoglobin interfere with the HbA1C  assay. Heterozygous hemoglobin variants (HbS, HgC,  etc)do  not significantly interfere with this assay.   However, presence of multiple variants may affect accuracy.     04/22/2024 11.0 (H) 4.0 - 5.6 % Final     Comment:     ADA Screening Guidelines:  5.7-6.4%  Consistent with prediabetes  >or=6.5%  Consistent with diabetes    High levels of fetal hemoglobin interfere with the HbA1C  assay. Heterozygous hemoglobin variants (HbS, HgC, etc)do  not significantly interfere with this assay.   However, presence of multiple variants may affect accuracy.       Hemoglobin A1c   Date Value Ref Range Status   03/27/2025 11.6 (H) 4.0 - 5.6 % Final     Comment:     ADA Screening Guidelines:  5.7-6.4%  Consistent with prediabetes  >=6.5%  Consistent with diabetes    High levels of fetal hemoglobin interfere with the HbA1C  assay. Heterozygous hemoglobin variants (HbS, HgC, etc)do  not significantly interfere with this assay.   However, presence of multiple variants may affect accuracy.       Review of Systems   Constitutional: Negative for chills.   Cardiovascular:  Negative for chest pain and claudication.   Respiratory:  Negative for cough.    Skin:  Positive for color change, dry skin and nail changes.   Musculoskeletal:  Positive for joint pain.   Gastrointestinal:  Negative for nausea.   Neurological:  Positive for paresthesias. Negative for numbness.   Psychiatric/Behavioral:  The patient is not nervous/anxious.         Objective:     Physical Exam  Constitutional:       Appearance: She is well-developed.      Comments: Oriented to time, place, and person.   Cardiovascular:      Comments: DP and PT pulses are palpable bilaterally. 3 sec capillary refill time and toes and feet are warm to touch proximally .  There is  hair growth on the feet and toes b/l. There is no edema b/l. No spider veins or varicosities present b/l.     Musculoskeletal:      Comments: Equinus noted b/l ankles with < 10 deg DF noted. MMT 5/5 in DF/PF/Inv/Ev resistance with no reproduction of pain in any  direction. Passive range of motion of ankle and pedal joints is painless b/l.    H/o L BKA    Feet:      Right foot:      Skin integrity: No callus or dry skin.      Left foot:      Skin integrity: No callus or dry skin.   Lymphadenopathy:      Comments: Negative lymphadenopathy bilateral popliteal fossa and tarsal tunnel.   Skin:     Comments: No open lesions, lacerations or wounds noted.Interdigital spaces clean, dry and intact b/l. No erythema noted to b/l foot.    Toenails 1-5 right are elongated by 2-3 mm, thickened by 2-3 mm, discolored/yellowed, dystrophic, brittle with subungual debris.       Neurological:      Mental Status: She is alert.      Comments: Light touch, proprioception, and sharp/dull sensation are all intact bilaterally. Protective threshold with the [employer] monofilament is intact bilaterally.    Psychiatric:         Behavior: Behavior is cooperative.           Assessment:      Encounter Diagnoses   Name Primary?    Type II diabetes mellitus with neurological manifestations Yes    Hammer toe, unspecified laterality     Onychomycosis due to dermatophyte      Plan:     Audrey was seen today for diabetes mellitus.    Diagnoses and all orders for this visit:    Type II diabetes mellitus with neurological manifestations  -     DIABETIC SHOES FOR HOME USE    Hammer toe, unspecified laterality  -     DIABETIC SHOES FOR HOME USE    Onychomycosis due to dermatophyte      I counseled the patient on her conditions, their implications and medical management.      - Shoe inspection. Diabetic Foot Education. Patient reminded of the importance of good nutrition and blood sugar control to help prevent podiatric complications of diabetes. Patient instructed on proper foot hygeine. We discussed wearing proper shoe gear, daily foot inspections, never walking without protective shoe gear, never putting sharp instruments to feet, routine podiatric nail visits every 2-3 months.   - With patient's  permission, nails were aggressively reduced and debrided x 5 to their soft tissue attachment mechanically and with electric , removing all offending nail and debris. Patient relates relief following the procedure. He will continue to monitor the areas daily, inspect his feet, wear protective shoe gear when ambulatory, moisturizer to maintain skin integrity and follow in this office in approximately 2-3 months, sooner p.r.n.     Rx diabetic shoes with custom molded inserts to be worn at all times while ambulating. Prescription provided with list of local retailers.

## 2025-04-23 NOTE — TELEPHONE ENCOUNTER
----- Message from Victoria sent at 4/23/2025 11:30 AM CDT -----  Who Called: self  Referral to What Specialty: [facility]  Reason for Referral: shoulder and neck pain  and left arm  Does the patient want the referral with a specific physician?: no  [facility] the [facility]  Would the patient rather a call back or a response via [facility]? Call back  Best Call Back Number: .[phone] Additional Information: fax number:  [phone]

## 2025-04-29 ENCOUNTER — OFFICE VISIT (OUTPATIENT)
Dept: OPTOMETRY | Facility: CLINIC | Age: 55
End: 2025-04-29
Payer: MEDICAID

## 2025-04-29 DIAGNOSIS — E11.42 TYPE 2 DIABETES MELLITUS WITH DIABETIC POLYNEUROPATHY, WITHOUT LONG-TERM CURRENT USE OF INSULIN: Primary | ICD-10-CM

## 2025-04-29 DIAGNOSIS — H52.223 HYPEROPIA OF BOTH EYES WITH REGULAR ASTIGMATISM AND PRESBYOPIA: ICD-10-CM

## 2025-04-29 DIAGNOSIS — H52.4 HYPEROPIA OF BOTH EYES WITH REGULAR ASTIGMATISM AND PRESBYOPIA: ICD-10-CM

## 2025-04-29 DIAGNOSIS — H01.02A SQUAMOUS BLEPHARITIS OF UPPER AND LOWER EYELIDS OF BOTH EYES: ICD-10-CM

## 2025-04-29 DIAGNOSIS — H01.02B SQUAMOUS BLEPHARITIS OF UPPER AND LOWER EYELIDS OF BOTH EYES: ICD-10-CM

## 2025-04-29 DIAGNOSIS — H52.03 HYPEROPIA OF BOTH EYES WITH REGULAR ASTIGMATISM AND PRESBYOPIA: ICD-10-CM

## 2025-04-29 PROCEDURE — 1159F MED LIST DOCD IN RCRD: CPT | Mod: CPTII,,, | Performed by: OPTOMETRIST

## 2025-04-29 PROCEDURE — 99999 PR PBB SHADOW E&M-EST. PATIENT-LVL I: CPT | Mod: PBBFAC,,, | Performed by: OPTOMETRIST

## 2025-04-29 PROCEDURE — 3046F HEMOGLOBIN A1C LEVEL >9.0%: CPT | Mod: CPTII,,, | Performed by: OPTOMETRIST

## 2025-04-29 PROCEDURE — 2023F DILAT RTA XM W/O RTNOPTHY: CPT | Mod: CPTII,,, | Performed by: OPTOMETRIST

## 2025-04-29 PROCEDURE — 4010F ACE/ARB THERAPY RXD/TAKEN: CPT | Mod: CPTII,,, | Performed by: OPTOMETRIST

## 2025-04-29 PROCEDURE — 99211 OFF/OP EST MAY X REQ PHY/QHP: CPT | Mod: PBBFAC,PO | Performed by: OPTOMETRIST

## 2025-04-29 PROCEDURE — 92014 COMPRE OPH EXAM EST PT 1/>: CPT | Mod: S$PBB,,, | Performed by: OPTOMETRIST

## 2025-04-29 PROCEDURE — 92015 DETERMINE REFRACTIVE STATE: CPT | Mod: ,,, | Performed by: OPTOMETRIST

## 2025-04-29 NOTE — PROGRESS NOTES
"HPI    Here for DM eye exam    Eye meds: none  OTC Generic Allergy   54 year old female states she uses readers for near.  States vision is not   clear as in was in the past. Denies flashes, floaters or diplopia. Denies   ocular pain. Pt says she has allergy eyes. Reports "night blindness"     Hemoglobin A1C       Date                     Value               Ref Range             Status                02/10/2025               11.4 (H)            4.0 - 5.6 %           Final                     10/11/2024               13.3 (H)            4.0 - 5.6 %           Final                ----------  Hemoglobin A1c       Date                     Value               Ref Range             Status                03/27/2025               11.6 (H)            4.0 - 5.6 %           Final              ----------   Last edited by Octavio Talavera, OD on 4/29/2025 10:40 AM.            Assessment /Plan     For exam results, see Encounter Report.    Type 2 diabetes mellitus with diabetic polyneuropathy, without long-term current use of insulin  -No obvious retinopathy noted today with wheelchair exam.  Continued control with primary care physician and annual comprehensive eye exam.    Squamous blepharitis of upper and lower eyelids of both eyes  -Nightly lid scrubs using Ocusoft. Systane Complete PF as needed.    STELLA w/ P  Eyeglass Final Rx       Eyeglass Final Rx         Sphere Cylinder Axis Add    Right +0.25 +1.00 170 +1.75    Left [place] +0.50 175 +1.75      Type: PAL    Expiration Date: 4/29/2026                    RTC 1 yr                    "

## 2025-05-01 ENCOUNTER — TELEPHONE (OUTPATIENT)
Dept: FAMILY MEDICINE | Facility: CLINIC | Age: 55
End: 2025-05-01
Payer: MEDICAID

## 2025-05-01 DIAGNOSIS — M54.12 CERVICAL RADICULOPATHY: ICD-10-CM

## 2025-05-01 NOTE — TELEPHONE ENCOUNTER
No care due was identified.  [facility] Embedded Care Due Messages. Reference number: [id number].   5/01/2025 4:33:00 PM CDT

## 2025-05-01 NOTE — TELEPHONE ENCOUNTER
Last Office Visit Info:   The patient's last visit with Donaldo Pena MD was on 3/25/2025.    The patient's last visit in current department was on 4/16/2025.

## 2025-05-02 RX ORDER — OXYCODONE AND ACETAMINOPHEN 10; 325 MG/1; MG/1
1 TABLET ORAL EVERY 12 HOURS PRN
Qty: 20 TABLET | Refills: 0 | Status: SHIPPED | OUTPATIENT
Start: 2025-05-02 | End: 2025-05-12

## 2025-05-02 NOTE — TELEPHONE ENCOUNTER
----- Message from Marybel sent at 5/1/2025  4:08 PM CDT -----  Who Called:self   Refill or New Rx:refill  RX Name and Strength: oxyCODONE-acetaminophen (PERCOCET)  mg per tablet        Is this a 30 day or 90 day RX:40  Preferred Pharmacy with phone number:[id number]  [place] - [address] AT [facility]   [phone]   Would the patient rather a call back or a response via [facility]?callback   Best Call Back Number:.[phone]   Family

## 2025-05-07 ENCOUNTER — TELEPHONE (OUTPATIENT)
Dept: FAMILY MEDICINE | Facility: CLINIC | Age: 55
End: 2025-05-07
Payer: MEDICAID

## 2025-05-07 DIAGNOSIS — E11.42 TYPE 2 DIABETES MELLITUS WITH DIABETIC POLYNEUROPATHY, WITH LONG-TERM CURRENT USE OF INSULIN: Primary | ICD-10-CM

## 2025-05-07 DIAGNOSIS — Z89.512 S/P BKA (BELOW KNEE AMPUTATION) UNILATERAL, LEFT: ICD-10-CM

## 2025-05-07 DIAGNOSIS — Z79.4 TYPE 2 DIABETES MELLITUS WITH DIABETIC POLYNEUROPATHY, WITH LONG-TERM CURRENT USE OF INSULIN: Primary | ICD-10-CM

## 2025-05-07 NOTE — TELEPHONE ENCOUNTER
----- Message from Lupe sent at 5/7/2025  8:37 AM CDT -----  .Type: Patient Call BackWho called: SelfWhat is the request in detail: Need a prescription for diabetic shoes. Ask that the nurse give her a call Can the clinic reply by [facility]? No Would the patient rather a call back or a response via [facility]? Call [facility] call back number: .[phone] (home) Additional Information:

## 2025-05-08 ENCOUNTER — OFFICE VISIT (OUTPATIENT)
Dept: ENDOCRINOLOGY | Facility: CLINIC | Age: 55
End: 2025-05-08
Payer: MEDICAID

## 2025-05-08 VITALS — SYSTOLIC BLOOD PRESSURE: 106 MMHG | DIASTOLIC BLOOD PRESSURE: 67 MMHG | HEART RATE: 79 BPM

## 2025-05-08 DIAGNOSIS — Z79.4 UNCONTROLLED TYPE 2 DIABETES MELLITUS WITH HYPERGLYCEMIA, WITH LONG-TERM CURRENT USE OF INSULIN: Primary | ICD-10-CM

## 2025-05-08 DIAGNOSIS — G47.33 OSA (OBSTRUCTIVE SLEEP APNEA): ICD-10-CM

## 2025-05-08 DIAGNOSIS — Z89.432 LEFT FOOT AMPUTEE: ICD-10-CM

## 2025-05-08 DIAGNOSIS — Z98.84 HISTORY OF BARIATRIC SURGERY: ICD-10-CM

## 2025-05-08 DIAGNOSIS — E66.812 CLASS 2 SEVERE OBESITY WITH SERIOUS COMORBIDITY AND BODY MASS INDEX (BMI) OF 37.0 TO 37.9 IN ADULT, UNSPECIFIED OBESITY TYPE: ICD-10-CM

## 2025-05-08 DIAGNOSIS — E78.5 HYPERLIPIDEMIA, UNSPECIFIED HYPERLIPIDEMIA TYPE: Chronic | ICD-10-CM

## 2025-05-08 DIAGNOSIS — E11.42 TYPE 2 DIABETES MELLITUS WITH DIABETIC POLYNEUROPATHY, WITH LONG-TERM CURRENT USE OF INSULIN: ICD-10-CM

## 2025-05-08 DIAGNOSIS — Z79.4 TYPE 2 DIABETES MELLITUS WITH DIABETIC POLYNEUROPATHY, WITH LONG-TERM CURRENT USE OF INSULIN: ICD-10-CM

## 2025-05-08 DIAGNOSIS — E11.65 UNCONTROLLED TYPE 2 DIABETES MELLITUS WITH HYPERGLYCEMIA, WITH LONG-TERM CURRENT USE OF INSULIN: Primary | ICD-10-CM

## 2025-05-08 DIAGNOSIS — E66.01 CLASS 2 SEVERE OBESITY WITH SERIOUS COMORBIDITY AND BODY MASS INDEX (BMI) OF 37.0 TO 37.9 IN ADULT, UNSPECIFIED OBESITY TYPE: ICD-10-CM

## 2025-05-08 DIAGNOSIS — I10 ESSENTIAL HYPERTENSION: Chronic | ICD-10-CM

## 2025-05-08 DIAGNOSIS — F31.9 BIPOLAR 1 DISORDER: Chronic | ICD-10-CM

## 2025-05-08 PROBLEM — L03.116 CELLULITIS OF LEFT FOOT: Status: RESOLVED | Noted: 2022-05-06 | Resolved: 2025-05-08

## 2025-05-08 PROBLEM — L97.421 DIABETIC ULCER OF LEFT MIDFOOT ASSOCIATED WITH TYPE 2 DIABETES MELLITUS, LIMITED TO BREAKDOWN OF SKIN: Status: RESOLVED | Noted: 2021-02-11 | Resolved: 2025-05-08

## 2025-05-08 PROBLEM — M25.673 DECREASED ROM OF ANKLE: Status: RESOLVED | Noted: 2020-06-04 | Resolved: 2025-05-08

## 2025-05-08 PROBLEM — Z86.31 HISTORY OF DIABETIC ULCER OF FOOT: Status: RESOLVED | Noted: 2020-12-23 | Resolved: 2025-05-08

## 2025-05-08 PROBLEM — R78.81 BACTEREMIA: Status: RESOLVED | Noted: 2024-05-02 | Resolved: 2025-05-08

## 2025-05-08 PROBLEM — R79.89 ELEVATED LACTIC ACID LEVEL: Status: RESOLVED | Noted: 2023-10-06 | Resolved: 2025-05-08

## 2025-05-08 PROBLEM — R26.9 GAIT ABNORMALITY: Status: RESOLVED | Noted: 2020-06-04 | Resolved: 2025-05-08

## 2025-05-08 PROBLEM — M86.9 OSTEOMYELITIS OF LEFT FOOT: Status: RESOLVED | Noted: 2021-02-10 | Resolved: 2025-05-08

## 2025-05-08 PROBLEM — R29.898 DECREASED STRENGTH OF LOWER EXTREMITY: Status: RESOLVED | Noted: 2020-06-04 | Resolved: 2025-05-08

## 2025-05-08 PROBLEM — S91.302A OPEN WOUND OF LEFT FOOT: Status: RESOLVED | Noted: 2022-09-08 | Resolved: 2025-05-08

## 2025-05-08 PROBLEM — E11.621 DIABETIC ULCER OF LEFT MIDFOOT ASSOCIATED WITH TYPE 2 DIABETES MELLITUS, LIMITED TO BREAKDOWN OF SKIN: Status: RESOLVED | Noted: 2021-02-11 | Resolved: 2025-05-08

## 2025-05-08 PROBLEM — R26.89 BALANCE PROBLEM: Status: RESOLVED | Noted: 2020-06-04 | Resolved: 2025-05-08

## 2025-05-08 PROCEDURE — 3078F DIAST BP <80 MM HG: CPT | Mod: CPTII,,, | Performed by: HOSPITALIST

## 2025-05-08 PROCEDURE — 1160F RVW MEDS BY RX/DR IN RCRD: CPT | Mod: CPTII,,, | Performed by: HOSPITALIST

## 2025-05-08 PROCEDURE — 1159F MED LIST DOCD IN RCRD: CPT | Mod: CPTII,,, | Performed by: HOSPITALIST

## 2025-05-08 PROCEDURE — 99215 OFFICE O/P EST HI 40 MIN: CPT | Mod: PBBFAC | Performed by: HOSPITALIST

## 2025-05-08 PROCEDURE — 4010F ACE/ARB THERAPY RXD/TAKEN: CPT | Mod: CPTII,,, | Performed by: HOSPITALIST

## 2025-05-08 PROCEDURE — 99204 OFFICE O/P NEW MOD 45 MIN: CPT | Mod: S$PBB,,, | Performed by: HOSPITALIST

## 2025-05-08 PROCEDURE — 99999 PR PBB SHADOW E&M-EST. PATIENT-LVL V: CPT | Mod: PBBFAC,,, | Performed by: HOSPITALIST

## 2025-05-08 PROCEDURE — 3074F SYST BP LT 130 MM HG: CPT | Mod: CPTII,,, | Performed by: HOSPITALIST

## 2025-05-08 PROCEDURE — 3046F HEMOGLOBIN A1C LEVEL >9.0%: CPT | Mod: CPTII,,, | Performed by: HOSPITALIST

## 2025-05-08 RX ORDER — BLOOD-GLUCOSE SENSOR
EACH MISCELLANEOUS
Qty: 3 EACH | Refills: 11 | Status: SHIPPED | OUTPATIENT
Start: 2025-05-08

## 2025-05-08 RX ORDER — INSULIN GLARGINE 100 [IU]/ML
30 INJECTION, SOLUTION SUBCUTANEOUS NIGHTLY
Qty: 15 ML | Refills: 36 | Status: SHIPPED | OUTPATIENT
Start: 2025-05-08

## 2025-05-08 RX ORDER — INSULIN ASPART 100 [IU]/ML
8 INJECTION, SOLUTION INTRAVENOUS; SUBCUTANEOUS
Qty: 15 ML | Refills: 6 | Status: SHIPPED | OUTPATIENT
Start: 2025-05-08

## 2025-05-08 RX ORDER — METFORMIN HYDROCHLORIDE 1000 MG/1
1000 TABLET ORAL 2 TIMES DAILY WITH MEALS
Qty: 180 TABLET | Refills: 1 | Status: SHIPPED | OUTPATIENT
Start: 2025-05-08

## 2025-05-08 RX ORDER — BLOOD-GLUCOSE,RECEIVER,CONT
EACH MISCELLANEOUS
Qty: 1 EACH | Refills: 0 | Status: SHIPPED | OUTPATIENT
Start: 2025-05-08

## 2025-05-08 NOTE — ASSESSMENT & PLAN NOTE
- lipid panel review today  - ASCVD Risk below: Statin: Taking  The 10-year ASCVD risk score (Estee LEDESMA, et al., 2019) is: 2.8%    Values used to calculate the score:      Age: 54 years      Sex: Female      Is Non- : No      Diabetic: Yes      Tobacco smoker: No      Systolic Blood Pressure: 106 mmHg      Is BP treated: Yes      HDL Cholesterol: 39 mg/dL      Total Cholesterol: 153 mg/dL

## 2025-05-08 NOTE — PROGRESS NOTES
Subjective:      Patient ID: Audrey Natarajan is a 54 y.o. female presented to [facility] today.  Chief complaint:  Diabetes      History of Present illness: Audrey Natarajan is a 54 y.o. female here for type 2 diabetes  Other significant past medical history:  History of left foot amputation due to Charcot foot 4/2023, Gastric sleeve 2013, chronic DVT on Eliquis, hyperglycemia, bipolar  Current PCP: Donaldo Pena MD    Interval history:  Sent by PCP for evaluation of poorly-controlled type 2 diabetes, current A1c 11.6% on 3/27/2025, 11.4, 13.3% in 2024  Diabetes appears to have worsened in 2022/2023 due to left diabetic foot ulcer, osteopenia with subsequent requiring BKA amputation in setting of Charcot's foot infection 4/2023.  Now A1c has been above 10% since  Patient currently on MDI insulin, questionable compliance.  Does admit she is injecting regularly  Not watching diet.  Glucose elevated on bloodwork at 435 in 3/27/2025, reports home glucose to be ranging from 263 this a.m., 267 yesterday,  On Ozempic with some weight loss, 2 mg once a week now  on metformin      Diabetes mellitus Type 2  - Known diabetic complications: nephropathy, neuropathy, PAD, SHAWN, L foot amputation (2023)   - Diagnosed w/ DM: in 2003, on insulin since 2013  - Diabetes Education: No  - Family history of diabetes: Yes  - Hx of pancreatitis/Diabetes Related Hospitalization:  None  - Office visit: 5/8/2025, new to me 1st visit sent by PCP for evaluation of poorly-controlled type 2 diabetes, current A1c 11.6% on 3/27/2025, 11.4, 13.3% in 2024. Diabetes appears to have worsened in 2022/2023 due to left diabetic foot ulcer, osteomyelitis, with subsequent requiring BKA amputation in setting of Charcot's foot infection 4/2023.  Now A1c has been above 10% since. Patient currently on MDI insulin, questionable compliance. Glucose elevated on bloodwork at 435 in 3/27/2025, reports home glucose to be  "ranging from 263 this a.m., 267. On Ozempic with some weight loss, 2 mg once a week now    Current reported meds:               Levemir 15 units twice a day    Novolog 5 units before each meals three times a day   Ozempic 2 mg once a week   Metformin 1000 mg twice a day   Misses medication doses - no  Previous meds tried:  Unable to recall  Home glucose checks: checks twice a day, hyperglycemia, see above    Diet/Exercise:   - Eating *2x meals per day , not watching diet  - Exercise:  Not very active: Due to left foot amputation, predominantly uses wheelchair  - Weight trend: stable  - Occupation: working/not working    Health maintenance/prevention:  - Aware a treatment plan for hypoglycemia  - Statin: Taking  - ACE/ARB: Taking  - Urine microalbumin: yearly>reviewed/done  - Eye exam current: yes, yearly, DR: unknown  - Check feet regularly, denies cuts or ulcers on feet  - SHAWN: No, History of obstructive sleep apnea    Diabetes lab work  Lab Results   Component Value Date    HGBA1C 11.6 (H) 03/27/2025    HGBA1C 11.4 (H) 02/10/2025    HGBA1C 13.3 (H) 10/11/2024     No results found for: "CPEPTIDE", "AUV75CJ", "GLUTAMICACID", "ISLETCELLANT", "INSABSS", "IA2ABS"   Random serum glucose:   Lab Results   Component Value Date     (H) 03/27/2025     (H) 02/10/2025     No results found for: "FRUCTOSAMINE", "GLYCOMARKTM"  Lab Results   Component Value Date    LABMICR 6.0 10/11/2024    LABMICR 32.0 04/22/2024    MICALBCREAT 14.0 10/11/2024    MICALBCREAT 62.7 (H) 04/22/2024    UTPCR Unable to calculate 04/20/2022    UTPCR Unable to calculate 04/20/2022     Diabetes Management Status: Reviewed this office visit  Screening or Prevention Patient's value Goal Complete/Controlled?   Lipid profile : 10/11/2024 Annually Yes   Dilated retinal exam : 04/29/2025 Annually Yes   Foot exam   : 03/20/2025 Annually Yes      The patient's medications, allergies, past medical, surgical, social and family histories were reviewed " and updated as appropriate.  Review of Systems: pertinent positives as per the above HPI, and otherwise negative.    Objective:   /67   Pulse 79   LMP 11/01/2011 (LMP Unknown) Comment: stated when she was 39  There is no height or weight on file to calculate BMI.  Vital signs reviewed    Physical Exam  Vitals and nursing note reviewed.   Constitutional:       Appearance: Normal appearance. She is well-developed. She is obese. She is not ill-appearing.      Comments: Wheelchair dependent female, left foot amputation   Neck:      Thyroid: No thyromegaly.   Pulmonary:      Effort: Pulmonary effort is normal. No respiratory distress.   Musculoskeletal:         General: Normal range of motion.      Cervical back: Normal range of motion.   Neurological:      General: No focal deficit present.      Mental Status: She is alert. Mental status is at baseline.   Psychiatric:         Mood and Affect: Mood normal.         Behavior: Behavior normal.       Labs reviewed:  See results in subjective  Lab Results   Component Value Date    HGBA1C 11.6 (H) 03/27/2025     Lab Results   Component Value Date    CHOL 153 10/11/2024    HDL 39 (L) 10/11/2024    LDLCALC Invalid, Trig>400.0 10/11/2024    TRIG 415 (H) 10/11/2024    CHOLHDL 25.5 10/11/2024     Lab Results   Component Value Date     03/27/2025    K 4.1 03/27/2025    CL 91 (L) 03/27/2025    CO2 30 (H) 03/27/2025     (H) 03/27/2025    BUN 11 03/27/2025    CREATININE 0.8 03/27/2025    CALCIUM 8.8 03/27/2025    PHOS 5.1 (H) 05/04/2024    PROT 6.6 03/27/2025    ALBUMIN 3.2 (L) 03/27/2025    BILITOT 0.2 03/27/2025    ALKPHOS 68 03/27/2025    AST 10 (L) 03/27/2025    ALT 13 03/27/2025    ANIONGAP 17 (H) 03/27/2025    EGFRNORACEVR >60 03/27/2025    TSH 1.880 12/22/2022    PTH 53.8 12/22/2022    REBQNSON13QP 35 02/09/2017     Assessment     1. Uncontrolled type 2 diabetes mellitus with hyperglycemia, with long-term current use of insulin  Basic Metabolic Panel     Hemoglobin A1C    Microalbumin/Creatinine Ratio, Urine    C-Peptide    Glutamic Acid Decarboxylase    Ambulatory referral/consult to Diabetes Education    insulin glargine U-100, Lantus, (LANTUS SOLOSTAR U-100 INSULIN) 100 unit/mL (3 mL) InPn pen    insulin aspart U-100 (NOVOLOG) 100 unit/mL (3 mL) InPn pen    semaglutide (OZEMPIC) 2 mg/dose (8 mg/3 mL) PnIj    metFORMIN (GLUCOPHAGE) 1000 MG tablet    DEXCOM G7 SENSOR Nuha    DEXCOM G7  Misc      2. Type 2 diabetes mellitus with diabetic polyneuropathy, with long-term current use of insulin  Ambulatory referral/consult to Endocrinology      3. Essential hypertension        4. Bipolar 1 disorder        5. Class 2 severe obesity with serious comorbidity and body mass index (BMI) of 37.0 to 37.9 in adult, unspecified obesity type        6. Left foot amputee        7. History of bariatric surgery        8. SHAWN (obstructive sleep apnea)        9. Hyperlipidemia, unspecified hyperlipidemia type           Plan     Uncontrolled type 2 diabetes mellitus with hyperglycemia, with long-term current use of insulin  - Diabetes is NOT AT GOAL, as indicated by the most recent A1C reviewed on 5/8/2025.  - Therapy Goals: Discussed the aim to achieve optimal control without causing hypoglycemia, Targeting an A1C of <7%.  - Poorly-controlled type 2 diabetes, current A1c 11.6% on 3/27/2025, 11.4, 13.3% in 2024. Diabetes appears to have worsened in 2022/2023 due to left foot foot infection with subsequent requiring BKA amputation in setting of Charcot's foot infection 4/2023.  Now A1c has been above 10% since 2025   - Diabetic Supplies and Medications: Reviewed to ensure continued refills and compliance.  - Preventive Care: Advised the patient to schedule periodic eye exams and maintain regular foot care monitoring.  - Annual Monitoring: Reviewed the importance of yearly lipid profile (with statin use) and urine protein/creatinine tests.    Plan  - Changes:  Increase MDI insulin  given hyperglycemia reported at home and on serum glucose              Lantus 30 units QHS   Novolog 8 units before each meals three times a day, sliding scale ISF >150 ISF 1:50  - Continue max dose Ozempic 2 mg once a week and Metformin 1000 mg twice a day   - Dietary Modifications: Encouraged portion size control and reduction of carbohydrate intake to better manage diabetes. >>Referred to diabetes education  - Candidate for CGM use given regular insulin injections daily:  Dexcom G7 sent to the local pharmacy  - Hypoglycemia Management: Discussed symptoms and treatment of hypoglycemia. The patient is informed; provided a monitoring/treatment handout.  - Clear written instructions provided on AVS. Follow-up scheduled within the next 3-4 months with lab work prior.   - Appointment date and time were provided to the patient today.     Essential hypertension  - Blood pressure review in clinic today  - On  blood pressure medication  - Manage by PCP    Bipolar 1 disorder  - on chronic medication, this can lead to weight gain and worsening glucose control    Class 2 severe obesity with serious comorbidity and body mass index (BMI) of 37.0 to 37.9 in adult  - There is no height or weight on file to calculate BMI.  Previous BMI at 39.85  - history of gastric sleeve in 2013  - Encourage pt to work on healthy diet, increase exercise as tolerated.  - Continue to monitor weight  - Ozempic 2 mg a week    History of bariatric surgery  - history of gastric sleeve in 2013  - monitor GI symptoms while on Ozempic    SHAWN (obstructive sleep apnea)  - on CPAP managed by pulmonology    Left foot amputee  - Left BKA amputation in setting of Charcot's foot infection 4/2023.  - high-risk of falls     Hyperlipidemia  - lipid panel review today  - ASCVD Risk below: Statin: Taking  The 10-year ASCVD risk score (Estee DK, et al., 2019) is: 2.8%    Values used to calculate the score:      Age: 54 years      Sex: Female      Is Non-  : No      Diabetic: Yes      Tobacco smoker: No      Systolic Blood Pressure: 106 mmHg      Is BP treated: Yes      HDL Cholesterol: 39 mg/dL      Total Cholesterol: 153 mg/dL       Advised patient to follow up with PCP for routine health maintenance care.   RTC in 3-4 months for diabetes    Visit today included increased complexity associated with the care of the episodic problem addressed and managing the longitudinal care of the patient due to the serious and/or complex managed problem(s).   Including: Type 2 diabetes, obesity, left foot amputation, SHAWN, history of bariatric gastric sleeve, bipolar disorder, hypertension, hyperlipidemia    Flex Galicia M.D.  Endocrinology  [facility]  5/8/2025      Disclaimer: This note has been generated in part with the use of voice-recognition software. There may be typographical errors that have been missed during proof-reading.

## 2025-05-08 NOTE — PATIENT INSTRUCTIONS
Lantus 30 units nightly   Novolog 8 units before meals three time a day     Blood sugar 150 to 200, + 1 unit  Blood sugar 201 to 250, + 2 units  Blood sugar 251 to 300, + 3 units  Blood sugar 301 to 350, + 4 units   Blood sugar greater than 350, + 5 units    Ozempic 2 mg once a week  Metformin 1000 mg twice a day    Goal blood sugar in the morning, before breakfast:   Glucose goal AFTER MEALS:    2 Hour after: less than 140  Before going to bed: 100-140  Do not go to bed with glucose less than 100  Have a small snack if glucose is lower than 100     Flex Galicia M.D  [facility]  [address] [facility] [address], [address]  [place] [address]    Office:  [phone]  Fax:  [phone]

## 2025-05-08 NOTE — ASSESSMENT & PLAN NOTE
- Diabetes is NOT AT GOAL, as indicated by the most recent A1C reviewed on 5/8/2025.  - Therapy Goals: Discussed the aim to achieve optimal control without causing hypoglycemia, Targeting an A1C of <7%.  - Poorly-controlled type 2 diabetes, current A1c 11.6% on 3/27/2025, 11.4, 13.3% in 2024. Diabetes appears to have worsened in 2022/2023 due to left foot foot infection with subsequent requiring BKA amputation in setting of Charcot's foot infection 4/2023.  Now A1c has been above 10% since 2025   - Diabetic Supplies and Medications: Reviewed to ensure continued refills and compliance.  - Preventive Care: Advised the patient to schedule periodic eye exams and maintain regular foot care monitoring.  - Annual Monitoring: Reviewed the importance of yearly lipid profile (with statin use) and urine protein/creatinine tests.    Plan  - Changes:  Increase MDI insulin given hyperglycemia reported at home and on serum glucose              Lantus 30 units QHS   Novolog 8 units before each meals three times a day, sliding scale ISF >150 ISF 1:50  - Continue max dose Ozempic 2 mg once a week and Metformin 1000 mg twice a day   - Dietary Modifications: Encouraged portion size control and reduction of carbohydrate intake to better manage diabetes. >>Referred to diabetes education  - Candidate for CGM use given regular insulin injections daily:  Dexcom G7 sent to the local pharmacy  - Hypoglycemia Management: Discussed symptoms and treatment of hypoglycemia. The patient is informed; provided a monitoring/treatment handout.  - Clear written instructions provided on AVS. Follow-up scheduled within the next 3-4 months with lab work prior.   - Appointment date and time were provided to the patient today.

## 2025-05-08 NOTE — ASSESSMENT & PLAN NOTE
- There is no height or weight on file to calculate BMI.  Previous BMI at 39.85  - history of gastric sleeve in 2013  - Encourage pt to work on healthy diet, increase exercise as tolerated.  - Continue to monitor weight  - Ozempic 2 mg a week

## 2025-05-09 DIAGNOSIS — M79.89 LEG SWELLING: Primary | ICD-10-CM

## 2025-05-12 RX ORDER — MELOXICAM 7.5 MG/1
7.5 TABLET ORAL DAILY
Qty: 30 TABLET | Refills: 1 | Status: SHIPPED | OUTPATIENT
Start: 2025-05-12

## 2025-05-12 NOTE — TELEPHONE ENCOUNTER
No care due was identified.  [facility] Embedded Care Due Messages. Reference number: [id number].   5/12/2025 8:27:08 AM CDT

## 2025-05-12 NOTE — TELEPHONE ENCOUNTER
----- Message from Med Assistant New sent at 5/12/2025  7:57 AM CDT -----  Type: RX Refill RequestWho Called: SelfHave you contacted your pharmacy:noRefill or New Rx:refill RX Name and Strength:meloxicam (MOBIC) 7.5 MG tabletPercocetPreferred Pharmacy with phone number: [id number] - [place], [place] - [address] AT [facility] Phone: [phone]: [phone] or Mail Order:localOrdering Provider:Storm the patient rather a call back or a response via [facility]? Yes, call Best Call Back Number: [phone] (home)

## 2025-05-13 ENCOUNTER — TELEPHONE (OUTPATIENT)
Dept: PODIATRY | Facility: CLINIC | Age: 55
End: 2025-05-13
Payer: MEDICAID

## 2025-05-13 DIAGNOSIS — M54.12 CERVICAL RADICULOPATHY: ICD-10-CM

## 2025-05-13 DIAGNOSIS — G54.6 PHANTOM LIMB PAIN: ICD-10-CM

## 2025-05-13 RX ORDER — GABAPENTIN 300 MG/1
600 CAPSULE ORAL 3 TIMES DAILY
Qty: 180 CAPSULE | Refills: 2 | Status: SHIPPED | OUTPATIENT
Start: 2025-05-13

## 2025-05-13 RX ORDER — OXYCODONE AND ACETAMINOPHEN 10; 325 MG/1; MG/1
1 TABLET ORAL EVERY 12 HOURS PRN
Qty: 20 TABLET | Refills: 0 | OUTPATIENT
Start: 2025-05-13 | End: 2025-05-23

## 2025-05-13 NOTE — TELEPHONE ENCOUNTER
No care due was identified.  [facility] Embedded Care Due Messages. Reference number: [id number].   5/13/2025 8:35:34 AM CDT

## 2025-05-13 NOTE — TELEPHONE ENCOUNTER
----- Message from Tech Tennilel sent at 5/13/2025  8:26 AM CDT -----  Regarding: Refill request  .Type: RX Refill RequestWho Called:self Have you contacted your pharmacy:no Refill or New Rx: RefillRX Name and Strength:gabapentin (NEURONTIN) 300 MG capsule and Percocet Preferred Pharmacy with phone number:.[facility] #[id number] [place], [place] - [address] AT [address] [address]Nbsvf: [phone] Fax: [phone] or Mail Order:local  Ordering Provider:LEVON Pena Would the patient rather a call back or a response via [facility]? Call Best Call Back Number:.[phone] Information:

## 2025-05-13 NOTE — TELEPHONE ENCOUNTER
No care due was identified.  [facility] Embedded Care Due Messages. Reference number: [id number].   5/13/2025 2:46:40 PM CDT

## 2025-05-13 NOTE — TELEPHONE ENCOUNTER
MAR to call office     Honey QUINN      ----- Message from Kaela sent at 5/13/2025  2:41 PM CDT -----  Type: Patient Call Back Who called:Self  What is the request in detail:pt requesting a call from nurse regarding pt COMPRESSION STOCKINGS pt stated its the wrong fit  Can the clinic reply by [facility]? No Would the patient rather a call back or a response via [facility]? Call back Best call back number:.[phone]  Additional Information: Thank you.

## 2025-05-14 ENCOUNTER — TELEPHONE (OUTPATIENT)
Dept: FAMILY MEDICINE | Facility: CLINIC | Age: 55
End: 2025-05-14
Payer: MEDICAID

## 2025-05-14 DIAGNOSIS — M54.12 CERVICAL RADICULOPATHY: ICD-10-CM

## 2025-05-14 DIAGNOSIS — G54.6 PHANTOM LIMB PAIN: ICD-10-CM

## 2025-05-14 DIAGNOSIS — M25.512 CHRONIC LEFT SHOULDER PAIN: Primary | ICD-10-CM

## 2025-05-14 DIAGNOSIS — G89.29 CHRONIC LEFT SHOULDER PAIN: Primary | ICD-10-CM

## 2025-05-14 RX ORDER — TRAMADOL HYDROCHLORIDE 50 MG/1
50 TABLET, FILM COATED ORAL EVERY 12 HOURS PRN
Qty: 60 TABLET | Refills: 0 | Status: SHIPPED | OUTPATIENT
Start: 2025-05-14

## 2025-05-14 NOTE — TELEPHONE ENCOUNTER
"Returned call to Audrey per f/u on refill request for oxycodone. Informed patient that refill was denied. Patient asked why? Nurse explained per chart medication is not appropriate for refill. Patient asked if needed to come in for a visit. Nurse explained that she has the earliest visit 07/23/2025 for Dr. Pena. Patient stressed that she would see any provider in the [facility]. Next available June 9. Patient declined explaining that she start PT on 5/20/2025 and see orthopedics on 05/27/2025. What I am supposed to do until then? Nurse asked if she's not taking Robaxin and using the lidocaine patches? Audrey replied, "yes but it does not take it completely away." Nurse verbalized understanding.  "

## 2025-05-14 NOTE — TELEPHONE ENCOUNTER
----- Message from Esperanza sent at 5/14/2025  3:55 PM CDT -----  Regarding: self  Who Called: selfHave you contacted your pharmacy:Refill traMADol (ULTRAM) 50 mg tabletRX Name and Strength:Preferred Pharmacy with phone number: [id number] - [place] - [address] [facility] [address] AT [address] [address]Kxury: [phone] Fax: [phone] or Mail Order: localWould the patient rather a call back or a response via [facility]?Best Call Back Number:  [phone] Information:Thank you.

## 2025-05-14 NOTE — TELEPHONE ENCOUNTER
----- Message from Tech Tennille sent at 5/14/2025  8:25 AM CDT -----  Regarding: Patient call back  .Type: Patient Call BackWho called:self What is the request in detail:checking on status of Percocet medication refill Can the clinic reply by [facility]?no Would the patient rather a call back or a response via [facility]? Call Best call back number:.[phone] Information:

## 2025-05-20 ENCOUNTER — CLINICAL SUPPORT (OUTPATIENT)
Dept: REHABILITATION | Facility: HOSPITAL | Age: 55
End: 2025-05-20
Payer: MEDICAID

## 2025-05-20 DIAGNOSIS — M25.512 CHRONIC LEFT SHOULDER PAIN: ICD-10-CM

## 2025-05-20 DIAGNOSIS — G89.29 CHRONIC NECK PAIN: ICD-10-CM

## 2025-05-20 DIAGNOSIS — M25.612 DECREASED ROM OF LEFT SHOULDER: ICD-10-CM

## 2025-05-20 DIAGNOSIS — R53.1 WEAKNESS: Primary | ICD-10-CM

## 2025-05-20 DIAGNOSIS — G89.29 CHRONIC LEFT SHOULDER PAIN: ICD-10-CM

## 2025-05-20 DIAGNOSIS — R68.89 DECREASED FUNCTIONAL ACTIVITY TOLERANCE: ICD-10-CM

## 2025-05-20 DIAGNOSIS — M54.2 CHRONIC NECK PAIN: ICD-10-CM

## 2025-05-20 PROCEDURE — 97161 PT EVAL LOW COMPLEX 20 MIN: CPT | Mod: PN

## 2025-05-20 PROCEDURE — 97110 THERAPEUTIC EXERCISES: CPT | Mod: PN

## 2025-05-20 NOTE — PROGRESS NOTES
"  Outpatient Rehab    Physical Therapy Evaluation    Patient Name: Audrey Natarajan  MRN: [id number]  YOB: 1970  Encounter Date: 5/20/2025    Therapy Diagnosis:   Encounter Diagnoses   Name Primary?    Chronic neck pain     Chronic left shoulder pain     Weakness Yes    Decreased ROM of left shoulder     Decreased functional activity tolerance      Physician: Geoffrey Ramos NP    Physician Orders: Eval and Treat  Medical Diagnosis: Chronic neck pain  Chronic left shoulder pain    Visit # / Visits Authorized:  1 / 1  Insurance Authorization Period: 4/16/2025 to 4/16/2026  Date of Evaluation: 5/20/2025  Plan of Care Certification: 5/20/2025 to 9/20/2025     Time In: 0800   Time Out: 0850  Total Time (in minutes): 50   Total Billable Time (in minutes): 50    Intake Outcome Measure for FOTO Survey    Therapist reviewed FOTO scores for Audrey Natarajan on 5/20/2025.   FOTO report - see Media section or FOTO account episode details.     Intake Score: 64.2 (DASH)%    Precautions:     L foot amputation      Subjective   History of Present Illness  Audrye is a 54 y.o. female who reports to physical therapy with a chief concern of NECK PAIN/ L shoulder.     The patient reports a medical diagnosis of M54.2,G89.29 (ICD-10-CM) - Chronic neck pain  M25.512,G89.29 (ICD-10-CM) - Chronic left shoulder pain.    Diagnostic tests related to this condition: CT scan.   CT Scan Details: FINDINGS:  "There is straightening of the normal cervical lordosis.  Cervical vertebral body heights and alignment are preserved.  There is subtle segmental and focal OPLL at C3, C4, and C6-7.  Bridging anterior paravertebral ossification from C4 through C7 suggests superimposed DISH.  The intervertebral disc spaces are preserved.     There is C1-2 arthrosis without significant soft tissue pannus.  Posterior elements are intact.  No high-grade central canal or foraminal stenosis.     Paraspinal soft tissues unremarkable.     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 Says she was prescribed medication from physician which has helped at times but pain is still present. She says she has issues using her L arm and has been confined to a wheelchair for 2 years since getting her L foot amputated. Also mentions having tremors and jerking in her hands at times.     Activities of Daily Living      General Prior Level of Function Comments: independent  General Current Level of Function Comments: assistance with household activities           Pain     Patient reports a current pain level of 5/10. Pain at best is reported as 0/10. Pain at worst is reported as 10/10.   Location: neck, L UE  Clinical Progression (since onset): Stable  Pain Qualities: Other (Comment)  Other Pain Qualities: shoulder strain, shooting, feels worse neuropathy  Pain-Relieving Factors: Medications - prescription, Other (Comment)  Other Pain-Relieving Factors: pain patches  Pain-Aggravating Factors: Reaching, Lifting, Sleeping, Other (Comment)  Other Pain-Aggravating Factors: propelling her wheelchair           Past Medical History/Physical Systems Review:   Audrey Natarajan  has a past medical history of ADHD (attention deficit hyperactivity disorder), Arthritis, Asthma, Bipolar 1 disorder, Cataract, Cigarette smoker, COPD (chronic obstructive pulmonary disease), Coronary artery disease, Depression, Diabetes mellitus, Diabetic foot ulcers, Diabetic neuropathy, DVT of lower extremity, bilateral, Encounter for blood transfusion, History of blood clots, HTN (hypertension), Hypercholesteremia, Irregular heartbeat, Neuromuscular disorder, Obese, PE (pulmonary embolism), and Restless leg syndrome.    Audrey Natarajan  has a past surgical history that includes " Splenectomy, total (July 2003); Vein Surgery (2003); Green' s filter (Right, 7/4/2012); Tonsillectomy; Abdominal surgery (2010); Ovarian cyst removal (3/13/2014); Hernia repair; Bilateral oophorectomy (Bilateral, 1/12/2015); pr removal of ovary/tube(s); Tympanostomy tube placement (1976); Laparoscopic cholecystectomy (N/A, 9/10/2020); Cholecystectomy; Debridement of foot (Bilateral, 5/10/2022); Incision and drainage foot (Left, 12/24/2022); Debridement of foot (Left, 2/28/2023); and Below knee amputation of lower extremity (Left, 4/19/2023).    Audrey has a current medication list which includes the following prescription(s): advair diskus, albuterol, albuterol-ipratropium, ammonium lactate, apixaban, aspirin, atorvastatin, menthol, blood sugar diagnostic, true metrix glucose meter, bumetanide, cyanocobalamin, dexcom g7 , dexcom g7 sensor, diclofenac sodium, diclofenac sodium, divalproex, dupixent pen, ferrous sulfate, fluconazole, fluticasone propionate, gabapentin, hydroxyzine hcl, insulin aspart u-100, lantus solostar u-100 insulin, lancets, lidocaine, lidocaine, lisinopril, loratadine, meloxicam, metformin, methocarbamol, metoprolol tartrate, multivitamin, mupirocin, nystatin, pantoprazole, bd christy 2nd gen pen needle, risperidone, semaglutide, tramadol, vitamin e, vyvanse, [DISCONTINUED] furosemide, and [DISCONTINUED] quetiapine.    Review of patient's allergies indicates:   Allergen Reactions    Morphine Other (See Comments)     Patient had a psychotic episode after taking Morphine  Agitation, hallucinations  Other Reaction(s): Other (See Comments), Other (See Comments)    Patient had a psychotic episode after taking Morphine    Patient had a psychotic episode after taking Morphine Agitation, hallucinations    Penicillins Anaphylaxis     Tolerated cephalosporins in the past    Januvia [sitagliptin] Hives    Carbamazepine Other (See Comments)     hyponatremia  Other Reaction(s): Other (See  "Comments)    hyponatremia        Objective      Subcranial Range of Motion   Active Restricted? Passive Restricted? Pain   Flexion         Protraction         Retraction           Cervical Range of Motion   Active (deg) Passive (deg) Pain   Flexion 35       Extension 27       Right Lateral Flexion 35       Right Rotation 57       Left Lateral Flexion 38       Left Rotation 49              Shoulder Range of Motion  Left Shoulder   Active (deg) Passive (deg) Pain   Flexion 90       Extension         Scaption         ABduction 90       ADduction         Horizontal ABduction         Horizontal ADduction         External Rotation (Shoulder ABducted 0 degrees)         External Rotation (Shoulder ABducted 45 degrees)         External Rotation (Shoulder ABducted 90 degrees)         Internal Rotation (Shoulder ABducted 0 degrees)         Internal Rotation (Shoulder ABducted 45 degrees)         Internal Rotation (Shoulder ABducted 90 degrees)                       Shoulder Strength - Planes of Motion   Right Strength Right Pain Left Strength Left  Pain   Flexion 4   3-     Extension           ABduction 4   3-     ADduction           Horizontal ABduction           Horizontal ADduction           Internal Rotation 0° 4+   4+     Internal Rotation 90°           External Rotation 0° 4+   4+     External Rotation 90°                            Gait Analysis  Gait Analysis Details  Mobilizes in wheelchair, L foot amputation          Treatment:  Therapeutic Exercise  TE 1: SNAGS ext 10 x 5"  TE 2: SNAGS R rotation 10 x 5"  TE 3: Thoracic ext towel roll 15 x 5"  TE 4: Table slides flexion/ scaption x 20      Time Entry(in minutes):  PT Evaluation (Low) Time Entry: 35  Therapeutic Exercise Time Entry: 15    Assessment & Plan   Assessment  Audrey presents with a condition of Low complexity.   Presentation of Symptoms: Stable       Functional Limitations: Activity tolerance, Bed mobility, Completing self-care activities, Disrupted sleep " pattern, Driving, Functional mobility, Manipulating objects, Pain when reaching, Pain with ADLs/IADLs, Performing household chores, Range of motion, Reaching  Impairments: Abnormal or restricted range of motion, Activity intolerance, Impaired physical strength, Pain with functional activity    Patient Goal for Therapy (PT): decrease pain, propel wheelchair with less pain  Prognosis: Fair  Assessment Details: Pt presents to therapy with the medical diagnosis of neck pain and L shoulder pain with referral symptoms down L UE. Pt's pain is complicated by L foot amputation which has her confined to a wheelchair, and it has contributed to postural dysfunction. Upon evaluation, pt demonstrated pain, stiffness, and weakness which impacts pt's ADLs, recreational activities, sleeping, and wheelchair mobility/propulsion in the community due to decreased activity tolerance and functional capacity. PT will emphasize impairments in order to restore pt to PLOF.      Plan  From a physical therapy perspective, the patient would benefit from: Skilled Rehab Services    Planned therapy interventions include: Therapeutic exercise, Therapeutic activities, Neuromuscular re-education, Manual therapy, and ADLs/IADLs.    Planned modalities to include: Cryotherapy (cold pack) and Thermotherapy (hot pack).        Visit Frequency: 2 times Per Week for 8 Weeks.       This plan was discussed with Patient.   Discussion participants: Agreed Upon Plan of Care             Patient's spiritual, cultural, and educational needs considered and patient agreeable to plan of care and goals.     Education  Education was done with Patient.           PT role, intervention rationale, possible sources of referral symptoms        Goals:   Active       Functional outcome       Patient will show a significant change in DASH patient-reported outcome tool to demonstrate subjective improvement       Start:  05/20/25    Expected End:  07/20/25            Patient stated  goal: decrease pain, propel wheelchair with less pain        Start:  05/20/25    Expected End:  07/20/25            Patient will demonstrate independence in home program for support of progression       Start:  05/20/25    Expected End:  07/20/25               Hand and arm use       Patient will reach multiple directions with minimal pain and difficulty        Start:  05/20/25    Expected End:  07/20/25               Pain       Patient will report pain of 2/10 or less demonstrating a reduction of overall pain       Start:  05/20/25    Expected End:  07/20/25               Range of Motion       Patient will achieve left cervical rotation ROM increase by 5 degrees       Start:  05/20/25    Expected End:  07/20/25            Patient will achieve cervical flexion ROM increase by 5 degrees       Start:  05/20/25    Expected End:  07/20/25            Patient will achieve cervical extension ROM increase by  5 degrees       Start:  05/20/25    Expected End:  07/20/25            Patient will achieve left shoulder flexion of 150+ degrees       Start:  05/20/25    Expected End:  07/20/25            Patient will achieve left shoulder abduction of 150+ degrees       Start:  05/20/25    Expected End:  07/20/25            Patient will achieve left shoulder functional external rotation to reach base of occiput        Start:  05/20/25    Expected End:  07/20/25            Patient will achieve left shoulder functional internal rotation to reach L4       Start:  05/20/25    Expected End:  07/20/25               Strength       Patient will achieve bilateral shoulder flexion strength of 5/5       Start:  05/20/25    Expected End:  07/20/25            Patient will achieve bilateral shoulder abduction strength of 5/5       Start:  05/20/25    Expected End:  07/20/25            Patient will achieve bilateral elbow flexion strength of 5/5       Start:  05/20/25    Expected End:  07/20/25            Patient will achieve bilateral elbow  extension strength of 5/5       Start:  05/20/25    Expected End:  07/20/25               functional        Pt will be able to propel wheelchair using L UE equally with R UE with minimal to no difficulty        Start:  05/20/25    Expected End:  07/20/25                Ricky Dangelo Jr, PT

## 2025-05-22 ENCOUNTER — CLINICAL SUPPORT (OUTPATIENT)
Dept: DIABETES | Facility: CLINIC | Age: 55
End: 2025-05-22
Payer: MEDICAID

## 2025-05-22 ENCOUNTER — CLINICAL SUPPORT (OUTPATIENT)
Dept: REHABILITATION | Facility: HOSPITAL | Age: 55
End: 2025-05-22
Payer: MEDICAID

## 2025-05-22 VITALS — HEIGHT: 66 IN | BODY MASS INDEX: 35.36 KG/M2 | WEIGHT: 220 LBS

## 2025-05-22 DIAGNOSIS — E11.65 UNCONTROLLED TYPE 2 DIABETES MELLITUS WITH HYPERGLYCEMIA, WITH LONG-TERM CURRENT USE OF INSULIN: ICD-10-CM

## 2025-05-22 DIAGNOSIS — R53.1 WEAKNESS: Primary | ICD-10-CM

## 2025-05-22 DIAGNOSIS — M25.612 DECREASED ROM OF LEFT SHOULDER: ICD-10-CM

## 2025-05-22 DIAGNOSIS — R68.89 DECREASED FUNCTIONAL ACTIVITY TOLERANCE: ICD-10-CM

## 2025-05-22 DIAGNOSIS — Z79.4 UNCONTROLLED TYPE 2 DIABETES MELLITUS WITH HYPERGLYCEMIA, WITH LONG-TERM CURRENT USE OF INSULIN: ICD-10-CM

## 2025-05-22 PROCEDURE — 99999PBSHW PR PBB SHADOW TECHNICAL ONLY FILED TO HB: Mod: PBBFAC,,,

## 2025-05-22 PROCEDURE — 99999 PR PBB SHADOW E&M-EST. PATIENT-LVL I: CPT | Mod: PBBFAC,,,

## 2025-05-22 PROCEDURE — 99211 OFF/OP EST MAY X REQ PHY/QHP: CPT | Mod: PBBFAC

## 2025-05-22 PROCEDURE — 97110 THERAPEUTIC EXERCISES: CPT | Mod: PN,CQ

## 2025-05-22 PROCEDURE — G0108 DIAB MANAGE TRN  PER INDIV: HCPCS | Mod: PBBFAC

## 2025-05-22 NOTE — PROGRESS NOTES
"  Outpatient Rehab    Physical Therapy Visit    Patient Name: Audrey Natarajan  MRN: [id number]  YOB: 1970  Encounter Date: 5/22/2025    Therapy Diagnosis:   Encounter Diagnoses   Name Primary?    Weakness Yes    Decreased ROM of left shoulder     Decreased functional activity tolerance      Physician: Geoffrey Ramos NP    Physician Orders: Eval and Treat  Medical Diagnosis: Chronic neck pain  Chronic left shoulder pain    Visit # / Visits Authorized:  1 / 16  Insurance Authorization Period: 5/22/2025 to 8/8/2025  Date of Evaluation: 5/20/2025  Plan of Care Certification: 5/20/2025 to 9/20/2025      PT/PTA: PTA   Number of PTA visits since last PT visit:1  Time In: 1100   Time Out: 1153  Total Time (in minutes): 53   Total Billable Time (in minutes): 53    FOTO:  Intake Score:  %  Survey Score 2:  %  Survey Score 3:  %    Precautions:     L foot amputation      Subjective   Patient reports neck and Left shoulder pain today, feeling tight and stiff.  Pain reported as 5/10.      Objective            Treatment:  Therapeutic Exercise  TE 1: SNAGS ext 2x10x3"  TE 2: SNAGS rotation 4x20" each  TE 3: Thoracic ext towel roll 15 x 5"  TE 4: Table slides flexion/ scaption x 20  TE 5: Upper trap stretch 4x20"  TE 6: Levator Scapulae stretch 4x20"  TE 7: Scapular retraction 3x10x3"  TE 8: Chin tucks 2x10x3"  TE 9: Pulley Flex/Scap 3 min each  TE 10: Seated Shoulder Row RTB 2x10x3"  Manual Therapy  MT 1: STM to UT, periscapulars  MT 2: PROM Left shoulder Flex/Abd      Time Entry(in minutes):  Therapeutic Exercise Time Entry: 53    Assessment & Plan   Assessment: Patient came for her first therapy treatment after the initial evaluation for cervical and Left shoulder pain. Patient reports neck and Left shoulder pain today, feeling tight and stiff. Focus on improving cervical/Left shoulder ROM/strength for pain relief and functional mobiltiy. She tolerated well. Cueing required to prevent shoulder elevation " during intervention. Noted very limited of shoulder ROM during pulley and MT. Instructed her to cont to work on HEP, she verbalized understanding. Will cont to progress per patient tolerance.  Evaluation/Treatment Tolerance: Patient tolerated treatment well    The patient will continue to benefit from skilled outpatient physical therapy in order to address the deficits listed in the problem list on the initial evaluation, provide patient and family education, and maximize the patients level of independence in the home and community environments.     The patient's spiritual, cultural, and educational needs were considered, and the patient is agreeable to the plan of care and goals.           Plan: Cont per POC    Goals:   Active       Functional outcome       Patient will show a significant change in DASH patient-reported outcome tool to demonstrate subjective improvement       Start:  05/20/25    Expected End:  07/20/25            Patient stated goal: decrease pain, propel wheelchair with less pain        Start:  05/20/25    Expected End:  07/20/25            Patient will demonstrate independence in home program for support of progression       Start:  05/20/25    Expected End:  07/20/25               Hand and arm use       Patient will reach multiple directions with minimal pain and difficulty        Start:  05/20/25    Expected End:  07/20/25               Pain       Patient will report pain of 2/10 or less demonstrating a reduction of overall pain       Start:  05/20/25    Expected End:  07/20/25               Range of Motion       Patient will achieve left cervical rotation ROM increase by 5 degrees       Start:  05/20/25    Expected End:  07/20/25            Patient will achieve cervical flexion ROM increase by 5 degrees       Start:  05/20/25    Expected End:  07/20/25            Patient will achieve cervical extension ROM increase by  5 degrees       Start:  05/20/25    Expected End:  07/20/25             Patient will achieve left shoulder flexion of 150+ degrees       Start:  05/20/25    Expected End:  07/20/25            Patient will achieve left shoulder abduction of 150+ degrees       Start:  05/20/25    Expected End:  07/20/25            Patient will achieve left shoulder functional external rotation to reach base of occiput        Start:  05/20/25    Expected End:  07/20/25            Patient will achieve left shoulder functional internal rotation to reach L4       Start:  05/20/25    Expected End:  07/20/25               Strength       Patient will achieve bilateral shoulder flexion strength of 5/5       Start:  05/20/25    Expected End:  07/20/25            Patient will achieve bilateral shoulder abduction strength of 5/5       Start:  05/20/25    Expected End:  07/20/25            Patient will achieve bilateral elbow flexion strength of 5/5       Start:  05/20/25    Expected End:  07/20/25            Patient will achieve bilateral elbow extension strength of 5/5       Start:  05/20/25    Expected End:  07/20/25               functional        Pt will be able to propel wheelchair using L UE equally with R UE with minimal to no difficulty        Start:  05/20/25    Expected End:  07/20/25                Eran To, PTA

## 2025-05-22 NOTE — PROGRESS NOTES
"Diabetes Care Specialist Progress Note  Author: Lesa Buckner RN  Date: 5/22/2025    Intake  Program Intake  Reason for Diabetes Program Visit:: Intervention  Type of Intervention:: Individual  Individual: Device Training  Device Training: Personal CGM  Current diabetes risk level:: high  In the last month, have you used the ER or been admitted to the hospital: Yes  Was the ER or hospital admission related to diabetes?: Yes  Permission to speak with others about care::  (Friend present at visit today)    Current Diabetes Treatment: Insulin, Oral Medications, DM Injectables  Oral Medication Type/Dose: Metformin 100mg BID  DM Injectables Type/Dose: Ozempic 2mg weekly  Method of insulin delivery?: Injections  Injection Type: Pens  Pen Type/Dose: Lantus 30U and Novolog 8U TID w/meals + SSI    Continuous Glucose Monitoring  Patient has CGM: Yes  Personal CGM type:: Dexcom G7 Start today per reciever    Lab Results   Component Value Date    HGBA1C 11.6 (H) 03/27/2025       Weight: 99.8 kg (220 lb)   Height: 5' 6" (167.6 cm)   Body mass index is 35.51 kg/m².    Lifestyle Coping Support & Clinical  Lifestyle/Coping/Support  Psychosocial/Coping Skills Assessment Completed: : No  Deffered due to:: Time  Area of need?: Deferred    Diabetes Self-Management Skills Assessment  Medication Skills Assessment  Patient is able to identify current diabetes medications, dosages, and appropriate timing of medications.: yes  Patient reports problems or concerns with current medication regimen.: no  Patient is  aware that some diabetes medications can cause low blood sugar?: Yes  Medication Skills Assessment Completed:: Yes  Assessment indicates:: Instruction Needed, Knowledge deficit  Area of need?: Yes    Diabetes Disease Process/Treatment Options  Diabetes Type?: Type II  When were you diagnosed?: Dx: 2003  If previous diabetes education, when/where:: "a while ago"  What are your goals for this education session?: To get Dexcom G7 " placed.  Diabetes Disease Process/Treatment Options: Skills Assessment Completed: Yes  Assessment indicates:: Instruction Needed, Knowledge deficit  Area of need?: Yes    Nutrition/Healthy Eating  Nutrition/Healthy Eating Skills Assessment Completed:: No  Deffered due to:: Time  Area of need?: Deferred    Physical Activity/Exercise  Physical Activity/Exercise Skills Assessment Completed: : No  Deffered due to:: Time  Area of need?: Deferred    Home Blood Glucose Monitoring  Patient states that blood sugar is checked at home daily.: yes  Personal CGM type:: Dexcom G7 Start today per reciever  What is your A1c Target?: 7.0%  Home Blood Glucose Monitoring Skills Assessment Completed: : Yes  Assessment indicates:: Instruction Needed, Knowledge deficit  Area of need?: Yes    Acute Complications  Have you ever had hypoglycemia (low BG 70 or less)?: yes  How often and what are your symptoms?: once every other week..............shaky or sweaty  How do you treat hypoglycemia?: juice or cookie  Have you ever had hyperglycemia (high  or more)?: yes  How do you treat hyperglycemia? : was hospitalized on 4/17/25  Do you have a sick day plan?: no  Acute Complications Skills Assessment Completed: : Yes  Assessment indicates:: Instruction Needed, Knowledge deficit  Area of need?: Yes    Chronic Complications  Chronic Complications Skills Assessment Completed: : No  Deferred due to:: Time  Area of need?: Deferred      Assessment Summary and Plan    Based on today's diabetes care assessment, the following areas of need were identified:      Identified Areas of Need      Medication/Current Diabetes Treatment: Yes - Discussed timing and mechanism of action of long and rapid acting insulin. Also discussed the importance of rotating injection sites and how to use her SSI. Pt will give correction dose for elevated BS.    Lifestyle Coping/Support: Deferred   Diabetes Disease Process/Treatment Options: Yes - Discussed significance of  current A1c and blood glucose goals.   Nutrition/Healthy Eating: Deferred    Physical Activity/Exercise: Deferred    Home Blood Glucose Monitoring: Yes - see care planning   Acute Complications: Yes - Discussed low blood sugar values, prevention, detection, signs and symptoms, and treatment of hypoglycemia following rule of 15.    Chronic Complications: Deferred   Today's interventions were provided through individual discussion, instruction, and written materials were provided.      Patient verbalized understanding of instruction and written materials.  Pt was able to return back demonstration of instructions today. Patient understood key points, needs reinforcement and further instruction.     Diabetes Self-Management Care Plan:    Today's Diabetes Self-Management Care Plan was developed with Audrey's input. Audrey has agreed to work toward the following goal(s) to improve his/her overall diabetes control.      Care Plan: Diabetes Management   Updates made since 5/22/2024 12:00 AM        Problem: Blood Glucose Self-Monitoring         Goal: Patient agrees to change Dexcom G7 sensor every 10 days and review blood sugars at least 3 times per day.    Start Date: 5/22/2025   Expected End Date: 6/5/2025   Priority: High   Barriers: No Barriers Identified   Note:    5/22/25- - - - DEXCOM G7 CGM TRAINING per reciever  Patient referred to clinic today for Dexcom G7 continuous glucose sensor system training. Pt used the dexcom  and reviewed video on starting dexcom G7 using the . Also reviewed instructions per Dexcom G7 educational insert.   Overview:  5min glucose reading updates, trending arrows, BG graph screens, battery life indicator, Blue Tooth Symbol.  Menus: trend Graph, start sensor, enter BG, events, Alerts, Settings, Shutdown, Stop Sensor   Settings:                          * Urgent Low: 55 mg/dL                          * Urgent Low Soon: on                          * Low alert: 70 mg/dl  repeat 15 min                          * High alert: 300 mg/dl                           * Rise rate: off                          * Fall Rate: off                          * Signal Loss: on repeat 20 min                          * No Reading: on repeat 20 min                          * Always sound: on  Reviewed additional setting options with patient, including Graph Height and Transmitter ID. Dexcom G7 was paired.    Reviewed where to find sensor insertion time and sensor expiration date.   Discussed no need to calibrate sensor during the entirety of the 10 day wear. Discussed that pt can calibrate sensor if desired, but at that time she will need to continue calibrating every 12 hours for sensor to remain accurate. Reviewed appropriate calibration techniques.  Reviewed Dexcom G7 site selection. Site selected and prepped using aseptic technique Inserted to back of left upper arm. Transmitter/sensor placed per instructions.  Patient able to demonstrate without difficulty.  Encouraged to review manual prior to starting another sensor.    range 20 feet, but the first 3 hrs keep within 3 feet of transmitter.  Pt instructed on lag time of interstitial fluid from CBG and was advised to tx hypoglycemia and dose insulin based on SMBG values.  Dexcom technical support contact number given and examples of when to contact them discussed. Patient advised to always check glucose with glucometer should readings do not match symptoms felt (ex. Hypo or hyperglycemia).          Follow Up Plan   Follow up in about 2 weeks (around 6/5/2025) for f/u upload G7 + finish assessment.    Today's care plan and follow up schedule was discussed with patient.  Audrey verbalized understanding of the care plan, goals, and agrees to follow up plan.        The patient was encouraged to communicate with his/her health care provider/physician and care team regarding his/her condition(s) and treatment.  I provided the patient with my  contact information today and encouraged to contact me via phone or [facility] Patient Portal as needed.     Length of Visit   Total Time: 60 Minutes

## 2025-05-23 ENCOUNTER — TELEPHONE (OUTPATIENT)
Dept: ENDOCRINOLOGY | Facility: CLINIC | Age: 55
End: 2025-05-23
Payer: MEDICAID

## 2025-05-23 NOTE — TELEPHONE ENCOUNTER
----- Message from Scott sent at 5/23/2025  1:42 PM CDT -----  Regarding: self  Type: Patient Call BackWho called:selfWhat is the request in detail: Patient is asking if Dr Galicia can call in more refills for her DEXCOM G7 SENSOR Nu[place]  Because the sensors were faulty. Patient called the pharmacy and the pharmacy told her to get in touch with DR HernandezCan the clinic reply by [facility]? NoWould the patient rather a call back or a response via [facility]? Call backBest call back number: [id number] - [place], [place] - [address] AT [address] [address]: [phone] Fax: [phone] Information: Patient is also asking for advice. Patient is asking for a call from the nurse regarding sensor locations on body. Thank you.

## 2025-05-23 NOTE — TELEPHONE ENCOUNTER
Pt was informed that she would have to call dexcom trouble-shooting/ replacement line. Contact given [phone], pt verbalized understanding

## 2025-05-26 ENCOUNTER — TELEPHONE (OUTPATIENT)
Dept: FAMILY MEDICINE | Facility: CLINIC | Age: 55
End: 2025-05-26
Payer: MEDICAID

## 2025-05-26 DIAGNOSIS — Z79.01 LONG TERM (CURRENT) USE OF ANTICOAGULANTS: ICD-10-CM

## 2025-05-26 DIAGNOSIS — Z86.718 HISTORY OF DVT (DEEP VEIN THROMBOSIS): ICD-10-CM

## 2025-05-26 NOTE — TELEPHONE ENCOUNTER
----- Message from Kaela sent at 5/26/2025  8:46 AM CDT -----  Type: RX Refill Request Who Called:Bethanie Have you contacted your pharmacy:Yes  Refill Or New Rx :refill  RX Name and Strength:Eliquis Mg 5 Preferred Pharmacy with phone number:.[id number] [place] - [address] AT [address] [address]: [phone] Fax: [phone] or Mail Order: Would the patient rather a call back or a response via [facility]?call back  Best Call Back Number:[phone] Additional Information:[facility] rep stated they need an approval  Thank you.  ----- Message -----  From: Kaela Donahue  Sent: 5/26/2025   8:48 AM CDT  To: Becky Fulton Staff    Type: RX Refill Request Who Called:Bethanie Have you contacted your pharmacy:Yes  Refill Or New Rx :refill  RX Name and Strength:Eliquis Mg 5 Preferred Pharmacy with phone number:.[id number] [place] [address] [facility] [address] AT [address] [address]nnf: [phone] Fax: [phone] or Mail Order: Would the patient rather a call back or a response via [facility]?call back  Best Call Back Number:[phone] Additional Information: Thank you.

## 2025-05-27 DIAGNOSIS — B35.9 TINEA: ICD-10-CM

## 2025-05-27 RX ORDER — NYSTATIN 100000 [USP'U]/G
POWDER TOPICAL
Qty: 60 G | Refills: 2 | Status: SHIPPED | OUTPATIENT
Start: 2025-05-27

## 2025-05-28 ENCOUNTER — OFFICE VISIT (OUTPATIENT)
Dept: ORTHOPEDICS | Facility: CLINIC | Age: 55
End: 2025-05-28
Payer: MEDICAID

## 2025-05-28 DIAGNOSIS — M25.512 CHRONIC LEFT SHOULDER PAIN: Primary | ICD-10-CM

## 2025-05-28 DIAGNOSIS — M54.2 NECK PAIN: ICD-10-CM

## 2025-05-28 DIAGNOSIS — G89.29 CHRONIC LEFT SHOULDER PAIN: Primary | ICD-10-CM

## 2025-05-28 PROCEDURE — 99204 OFFICE O/P NEW MOD 45 MIN: CPT | Mod: S$PBB,,,

## 2025-05-28 PROCEDURE — 4010F ACE/ARB THERAPY RXD/TAKEN: CPT | Mod: CPTII,,,

## 2025-05-28 PROCEDURE — 3046F HEMOGLOBIN A1C LEVEL >9.0%: CPT | Mod: CPTII,,,

## 2025-05-28 PROCEDURE — 99999 PR PBB SHADOW E&M-EST. PATIENT-LVL IV: CPT | Mod: PBBFAC,,,

## 2025-05-28 PROCEDURE — 99214 OFFICE O/P EST MOD 30 MIN: CPT | Mod: PBBFAC,PN

## 2025-05-28 PROCEDURE — 1159F MED LIST DOCD IN RCRD: CPT | Mod: CPTII,,,

## 2025-05-28 PROCEDURE — 1160F RVW MEDS BY RX/DR IN RCRD: CPT | Mod: CPTII,,,

## 2025-05-28 NOTE — PROGRESS NOTES
"Subjective:      Patient ID: Audrey Natarajan is a 54 y.o. female.    Chief Complaint: Pain of the Left Shoulder      HPI: Audrey Natarajan is a 54 y.o. right hand dominant female who presents to clinic for constant left shoulder and neck pain. The patient denies known GONSALO.  The pain started the 2nd week of January and is becoming progressively worse. Pain is located over (points to) left neck, radiating to the shoulder, down the arm, and into the hand.  She reports that the pain is a 8 /10 sharp, aching, and stabbing pain today. Pain is 10/10 at its worst. The pain is aggravated by use of the arm, overhead lifting.  Associated symptoms include neck pain that radiates up causing headaches as well as symptoms radiating down the arm. Reports tingling in bilateral hands, but patient attributes this to her neuropathy. The pain is affecting ADLs and limiting desired level of activity. There is not a history of previous surgery to the neck or shoulders.      Previous treatments include ice, heat, HEP, physical therapy x1 visit, Robaxin, NSAIDs (caused GI upset), Percocet  mg, Gabapentin, and Tramadol which have provided minimal relief.  Patient states the only lasting relief was from the Percocet.      Occupation: currently unemployed    Ambulating: in a wheelchair (hx of left below the knee amputation "due to diabetes")  Diabetic:  Yes (most recent Hemoglobin A1C: 11.6)  Smoking:  She quit smoking approximately 2.5 years ago.  History of DVT/PE: Positive for DVT approximately 4 years ago. Currently on Eliquis 5 mg BID.     PAST MEDICAL HISTORY:    Past Medical History:   Diagnosis Date    ADHD (attention deficit hyperactivity disorder)     Arthritis     Asthma     Bipolar 1 disorder     Cataract     Cigarette smoker     COPD (chronic obstructive pulmonary disease)     Coronary artery disease     A fib    Depression     bipolar manic depresson    Diabetes mellitus     Diabetic foot ulcers     Diabetic " "neuropathy     DVT of lower extremity, bilateral 07/2013    bilateral LE DVT. Estelita filter placed.     Encounter for blood transfusion     History of blood clots 1. Left Leg=2003; 2.Bilateral Groin=Blood Clots= 5 or 6/ 2013 & 7/2013; 3. LLL of Lung=7/2013;  4. Lt. Lower Leg=7/2013.     Pt. had 1st Blood Clot after Rhxmmgofvxez=5668, & Last=2013. Estelita Filter= Rt.Lateral Neck.    HTN (hypertension) 06/06/2013    Pt states that she does not have hypertension    Hypercholesteremia     Irregular heartbeat     Neuromuscular disorder     neuropathy feet    Obese     PE (pulmonary embolism) 07/2013    bilat LE DVT.     Restless leg syndrome      PAST SURGICAL HISTORY:    Past Surgical History:   Procedure Laterality Date    ABDOMINAL SURGERY  2010    gastric sleeve    BELOW KNEE AMPUTATION OF LOWER EXTREMITY Left 4/19/2023    Procedure: AMPUTATION, BELOW KNEE;  Surgeon: Gabe Munoz MD;  Location: [facility] OR;  Service: General;  Laterality: Left;  RN PREOP 4/11/2023    BILATERAL OOPHORECTOMY Bilateral 1/12/2015    CHOLECYSTECTOMY      DEBRIDEMENT OF FOOT Bilateral 5/10/2022    Procedure: DEBRIDEMENT, FOOT;  Surgeon: Maira De Los Santos DPM;  Location: [facility] OR;  Service: Podiatry;  Laterality: Bilateral;    DEBRIDEMENT OF FOOT Left 2/28/2023    Procedure: DEBRIDEMENT, FOOT,biopsy;  Surgeon: Maira De Los Santos DPM;  Location: [facility] OR;  Service: Podiatry;  Laterality: Left;  request misonix, wound VAC, possible neoxx    Green' s filter Right 7/4/2012    Right Neck & Tunneled Down.    HERNIA REPAIR      "[place] of Hernias Repaires around th Belly Button.", pt. states    INCISION AND DRAINAGE FOOT Left 12/24/2022    Procedure: INCISION AND DRAINAGE, FOOT;  Surgeon: Fahad Razo DPM;  Location: [facility] OR;  Service: Podiatry;  Laterality: Left;    LAPAROSCOPIC CHOLECYSTECTOMY N/A 9/10/2020    Procedure: CHOLECYSTECTOMY, LAPAROSCOPIC;  Surgeon: Montrell Gutierrez MD;  Location: [facility] OR;  Service: General;  Laterality: N/A;  RN " PREOP ----COVID Negative      OVARIAN CYST REMOVAL  3/13/2014    [place] REMOV[place] OF OV[place]/TUBE(S)      SPLENECTOMY, TOTAL  2003    TONSILLECTOMY      as a child    TYMPANOSTOMY TUBE PLACEMENT  1976    VEIN SURGERY      Lt leg     FAMILY HISTORY:    Family History   Problem Relation Name Age of Onset    Hypertension Father      Diabetes Father      Heart disease Father      Cataracts Father      Diabetes Paternal Grandfather      Heart disease Paternal Grandfather      No Known Problems Mother      Ovarian cancer Maternal Grandmother           from this. ? age     No Known Problems Sister      No Known Problems Brother      No Known Problems Maternal Aunt      No Known Problems Maternal Uncle      No Known Problems Paternal Aunt      No Known Problems Paternal Uncle      No Known Problems Maternal Grandfather      Ovarian cancer Paternal Grandmother      Uterine cancer Neg Hx      Breast cancer Neg Hx      Colon cancer Neg Hx      Amblyopia Neg Hx      Blindness Neg Hx      Cancer Neg Hx      Glaucoma Neg Hx      Macular degeneration Neg Hx      Retinal detachment Neg Hx      Strabismus Neg Hx      Stroke Neg Hx      Thyroid disease Neg Hx       SOCIAL HISTORY:    Social History     Occupational History    Not on file   Tobacco Use    Smoking status: Former     Current packs/day: 0.00     Average packs/day: 0.5 packs/day for 37.0 years (18.5 ttl pk-yrs)     Types: Cigarettes     Start date: 1983     Quit date: 2022     Years since quittin.4    Smokeless tobacco: Current    Tobacco comments:     Enrolled in the [employer] on 5/3/14 ([email or web address] Member ID # [id number]). Ambulatory referral to Smoking Cessation Program   Substance and Sexual Activity    Alcohol use: No     Alcohol/week: 0.0 standard drinks of alcohol    Drug use: No    Sexual activity: Yes     Partners: Male      MEDICATIONS:   Current Medications[1]    ALLERGIES:   Review of patient's allergies indicates:   Allergen Reactions     Morphine Other (See Comments)     Patient had a psychotic episode after taking Morphine  Agitation, hallucinations  Other Reaction(s): Other (See Comments), Other (See Comments)    Patient had a psychotic episode after taking Morphine    Patient had a psychotic episode after taking Morphine Agitation, hallucinations    Penicillins Anaphylaxis     Tolerated cephalosporins in the past    Januvia [sitagliptin] Hives    Carbamazepine Other (See Comments)     hyponatremia  Other Reaction(s): Other (See Comments)    hyponatremia     Review of Systems:  Constitution: Negative for chills, fever and night sweats.   HENT: Negative for congestion and headaches.    Eyes: Negative for blurred vision or vision loss.  Cardiovascular: Negative for chest pain and syncope.   Respiratory: Negative for cough and shortness of breath.    Endocrine: Negative for polydipsia, polyphagia and polyuria.   Hematologic/Lymphatic: Negative for bleeding problem. Does not bruise/bleed easily.   Skin: Negative for dry skin, itching and rash.   Musculoskeletal: See HPI.   Gastrointestinal: Negative for abdominal pain and bowel incontinence.   Genitourinary: Negative for bladder incontinence and nocturia.   Neurological: Negative for disturbances in coordination, loss of balance and seizures.   Psychiatric/Behavioral: Negative for depression. The patient does not have insomnia.    Allergic/Immunologic: Negative for hives and persistent infections.          Objective:      There were no vitals filed for this visit.    PHYSICAL EXAM:  General: Alert & oriented x3, well-developed and well-nourished, in no acute distress, sitting comfortably in the exam room.  Skin: Warm and dry. Capillary refill less than 2 seconds.   Head: Normocephalic and atraumatic.   Eyes: Sclera appear normal.   Nose: No deformities seen.   Ears: No deformities seen.   Neck: No tracheal deviation present.   Pulmonary/Chest: Breathing unlabored.   Neurological: Alert and oriented to  person, place, and time.   Psychiatric: Mood is pleasant and affect appropriate.     LEFT SHOULDER        Inspection/Observation:   No evidence of swelling, redness, scars, visible deformity, or atrophy.         Palpation:     Diffuse tenderness to the back of the neck, scapular spine, greater tuberosity, and clavicle.         Range of Motion:   Active Forward Flexion:  90°  Active Abduction:   90°  Active Internal Rotation: to left hip   Active External Rotation:  20°        Special Tests:  Empty can test  Positive for pain  Full can test   Positive for pain  Resisted internal rotation Positive for pain  Resisted external rotation Positive for pain  Neer's test   Positive for pain  Kirby'-Shaun test Positive for pain        Strength Testing:  Forward Flexion  4/5  Abduction   4/5  Internal Rotation  4/5  External Rotation  4/5        Neurovascular Exam Bilateral UEs:  Sensation intact to light touch in the distal median, radial, and ulnar nerve distributions bilaterally.  Capillary refill intact <2 seconds in all digits bilaterally    Imaging:   X-Rays: 3 views of left shoulder dated 03/18/2025, and independently reviewed, show: Degenerative changes of the acromioclavicular joint. A few calcifications adjacent to the humeral head which may be seen with calcific tendinitis. Diffuse osteopenia. No evidence of acute fracture or dislocation. No soft tissue abnormality.         Assessment:       1. Chronic left shoulder pain    2. Neck pain        Plan:       Orders Placed This Encounter    Ambulatory referral/consult to Spine Care       I explained the nature of the problem to the patient.     I discussed at length with the patient all the different treatment options available for her left shoulder including anti-inflammatories, acetaminophen, rest, ice, heat, physical therapy, and corticosteroid injections. I explained the potential role of surgery in the treatment of this condition. The patient understands that  if non-surgical measures do not adequately control symptoms, surgery will be considered in the future.     I did explain to the patient she likely has contributing factors from both her neck and her shoulder.  I recommend a referral to back and spine for evaluation treatment of her neck.  With regards to the shoulder I recommend the following:  Medications:  Encouraged patient continue to take previously prescribed medications from PCP.  I informed patient we will not be prescribing narcotics.  Offered prescription for NSAID however patient states this causes GI upset.  Physical Therapy:  Continue with previous referral to physical therapy for shoulder ROM, stretching, strengthening, and conditioning.  Procedures:  None today due to elevated Hemoglobin A1C (11.6 on 03/27/2025) and poorly controlled blood sugars.  Pain Management: Encouraged patient to apply ice and/or heat compress to the affected area 2-3x a day for 15-20 minutes as needed for pain management.  Referrals:  Referral to Back & Spine for evaluation and treatment of neck pain.       Follow-Up: 2-3 months for follow-up once she completes physical therapy. Consider MRI if symptoms persist.     All of the patient's questions were answered and the patient will contact us if they have any questions or concerns in the interim.      Rhona Jackman PA-C  [facility]  Orthopedic Surgery    Medical Dictation software was used during the dictation of portions or the entirety of this medical record.  Phonetic or grammatic errors may exist due to the use of this software. For clarification, refer to the author of the document.              [1]   Current Outpatient Medications:     ADVAIR DISKUS 250-50 mcg/dose diskus inhaler, INHALE 1 PUFF INTO THE LUNGS TWICE DAILY, Disp: 60 each, Rfl: 2    albuterol (PROVENTIL/VENTOLIN HFA) 90 mcg/actuation inhaler, INHALE 2 PUFFS INTO THE LUNGS EVERY 6 HOURS AS NEEDED FOR WHEEZING, Disp: 6.7 g, Rfl: 2     albuterol-ipratropium (DUO-NEB) 2.5 mg-0.5 mg/3 mL nebulizer solution, USE 3 ML VIA NEBULIZER EVERY 6 HOURS AS NEEDED FOR WHEEZING OR SHORTNESS OF BREATH OR RESCUE, Disp: 270 mL, Rfl: 3    ammonium lactate (LAC-HYDRIN) 12 % lotion, APPL Y ONCE TOPICALLY TWICE DAILY FOR 30 DAYS, Disp: 225 g, Rfl: 0    apixaban (ELIQUIS) 5 mg Tab, Take 1 tablet (5 mg total) by mouth 2 (two) times daily., Disp: 60 tablet, Rfl: 5    atorvastatin (LIPITOR) 40 MG tablet, Take 1 tablet (40 mg total) by mouth once daily., Disp: 90 tablet, Rfl: 3    BIOFREEZE, MENTHOL, TOP, Apply topically., Disp: , Rfl:     blood sugar diagnostic Strp, To check BG three times daily, to use with insurance preferred meter, Disp: 300 each, Rfl: 3    blood-glucose meter (TRUE METRIX GLUCOSE METER) Misc, USE TO TEST BLOOD GLUCOSE THREE TIMES DAILY AS DIRECTED, Disp: 1 each, Rfl: 0    bumetanide (BUMEX) 1 MG tablet, Take 1 tablet (1 mg total) by mouth once daily., Disp: 90 tablet, Rfl: 3    cyanocobalamin (VITAMIN B-12) 1000 MCG tablet, Take 100 mcg by mouth once daily., Disp: , Rfl:     DEXCOM G7  Misc, Use 1  to track blood glucose, ICD10: E11.65, Disp: 1 each, Rfl: 0    DEXCOM G7 SENSOR [place], Use 1 sensor every 10 days to track blood glucose, ICD10: E11.65, okay with 90 day supply if possible, Disp: 3 each, Rfl: 11    diclofenac sodium (VOLTAREN) 1 % Gel, Apply 2 g topically 2 (two) times daily., Disp: 100 g, Rfl: 3    diclofenac sodium (VOLTAREN) 1 % Gel, Apply 2 g topically 4 (four) times daily as needed (bilateral hands and knees)., Disp: 200 g, Rfl: 0    DUPIXENT  mg/2 mL PnIj, Inject into the skin every 14 (fourteen) days., Disp: , Rfl:     ferrous sulfate 325 (65 FE) MG EC tablet, Take 1 tablet (325 mg total) by mouth once daily., Disp: 30 tablet, Rfl: 0    fluconazole (DIFLUCAN) 150 MG Tab, Take 1 tablet (150 mg total) by mouth once daily. May take second tab po two days after first if symptoms persist, Disp: 2 tablet, Rfl: 0     fluticasone propionate (FLONASE) 50 mcg/actuation nasal spray, 1 spray (50 mcg total) by Each Nostril route once daily., Disp: 16 mL, Rfl: 0    gabapentin (NEURONTIN) 300 MG capsule, Take 2 capsules (600 mg total) by mouth 3 (three) times daily., Disp: 180 capsule, Rfl: 2    hydrOXYzine HCL (ATARAX) 25 MG tablet, Take 1 tablet (25 mg total) by mouth every 6 (six) hours as needed for itching or anxiety., Disp: 20 tablet, Rfl: 0    insulin aspart U-100 (NOVOLOG) 100 unit/mL (3 mL) InPn pen, Inject 8 Units into the skin 3 (three) times daily with meals., Disp: 15 mL, Rfl: 6    insulin glargine U-100, Lantus, (LANTUS SOLOSTAR U-100 INSULIN) 100 unit/mL (3 mL) InPn pen, Inject 30 Units into the skin every evening., Disp: 15 mL, Rfl: 36    lancets [facility], To check BG three times daily, to use with insurance preferred meter, Disp: 300 each, Rfl: 3    LIDOcaine (LIDODERM) 5 %, UNWRAP AND APPLY 1 PATCH TO SKIN ONCE DAILY. REMOVE AND DISCARD AFTER 12 HOURS, Disp: 15 patch, Rfl: 1    LIDOcaine (LIDODERM) 5 %, Place 1 patch onto the skin once daily. Remove & Discard patch within 12 hours or as directed by MD, Disp: 30 patch, Rfl: 0    lisinopriL 10 MG tablet, Take 1 tablet (10 mg total) by mouth every evening., Disp: 90 tablet, Rfl: 2    loratadine (CLARITIN) 10 mg tablet, Take 1 tablet (10 mg total) by mouth once daily., Disp: 90 tablet, Rfl: 3    meloxicam (MOBIC) 7.5 MG tablet, Take 1 tablet (7.5 mg total) by mouth once daily., Disp: 30 tablet, Rfl: 1    metFORMIN (GLUCOPHAGE) 1000 MG tablet, Take 1 tablet (1,000 mg total) by mouth 2 (two) times daily with meals., Disp: 180 tablet, Rfl: 1    methocarbamoL (ROBAXIN) 500 MG Tab, TAKE 1 TABLET(500 MG) BY MOUTH THREE TIMES DAILY as needed for back pain, Disp: 45 tablet, Rfl: 1    metoprolol tartrate (LOPRESSOR) 25 MG tablet, Take 1 tablet (25 mg total) by mouth 2 (two) times daily., Disp: 180 tablet, Rfl: 3    multivitamin Tab, Take 1 tablet by mouth once daily., Disp: 30 tablet,  "Rfl: 2    mupirocin (BACTROBAN) 2 % ointment, Apply topically 3 (three) times daily. Right nostril, Disp: 30 g, Rfl: 0    nystatin (NYSTOP) powder, APPLY TO ABDOMINAL AND BREAST SKIN FOLD TWICE DAILY, Disp: 60 g, Rfl: 2    pantoprazole (PROTONIX) 40 MG tablet, Take 1 tablet (40 mg total) by mouth once daily., Disp: 90 tablet, Rfl: 3    pen needle, diabetic (BD ERIC 2ND GEN PEN NEEDLE) 32 gauge x 5/32" Ndle, USE TO INJECT INSULIN FOUR TIMES DAILY AS DIRECTED, Disp: 400 each, Rfl: 3    risperiDONE (RISPERDAL) 3 MG Tab, Take 1 tablet (3 mg total) by mouth in the morning and 1 tablet (3 mg total) in the evening. Indications: Mood., Disp: 60 tablet, Rfl: 0    semaglutide (OZEMPIC) 2 mg/dose (8 mg/3 mL) PnIj, Inject 2 mg into the skin every 7 days., Disp: 9 mL, Rfl: 1    traMADoL (ULTRAM) 50 mg tablet, Take 1 tablet (50 mg total) by mouth every 12 (twelve) hours as needed for Pain., Disp: 60 tablet, Rfl: 0    vitamin E 1000 UNIT capsule, Take 1,000 Units by mouth once daily., Disp: , Rfl:     VYVANSE 40 mg Cap, Take 40 mg by mouth once daily., Disp: , Rfl:     aspirin 81 MG Chew, Take 1 tablet (81 mg total) by mouth once daily., Disp: 30 tablet, Rfl: 0    divalproex (DEPAKOTE) 500 MG TbEC, Take 1 tablet (500 mg total) by mouth every evening., Disp: 30 tablet, Rfl: 11    "

## 2025-06-03 ENCOUNTER — TELEPHONE (OUTPATIENT)
Dept: PODIATRY | Facility: CLINIC | Age: 55
End: 2025-06-03
Payer: MEDICAID

## 2025-06-04 ENCOUNTER — TELEPHONE (OUTPATIENT)
Dept: DIABETES | Facility: CLINIC | Age: 55
End: 2025-06-04
Payer: MEDICAID

## 2025-06-04 DIAGNOSIS — E11.42 TYPE 2 DIABETES MELLITUS WITH DIABETIC POLYNEUROPATHY, WITH LONG-TERM CURRENT USE OF INSULIN: ICD-10-CM

## 2025-06-04 DIAGNOSIS — Z79.4 TYPE 2 DIABETES MELLITUS WITH DIABETIC POLYNEUROPATHY, WITH LONG-TERM CURRENT USE OF INSULIN: ICD-10-CM

## 2025-06-04 RX ORDER — ATORVASTATIN CALCIUM 40 MG/1
40 TABLET, FILM COATED ORAL DAILY
Qty: 90 TABLET | Refills: 3 | Status: SHIPPED | OUTPATIENT
Start: 2025-06-04 | End: 2026-06-04

## 2025-06-05 ENCOUNTER — CLINICAL SUPPORT (OUTPATIENT)
Dept: DIABETES | Facility: CLINIC | Age: 55
End: 2025-06-05
Payer: MEDICAID

## 2025-06-05 VITALS — BODY MASS INDEX: 35.36 KG/M2 | WEIGHT: 220 LBS | HEIGHT: 66 IN

## 2025-06-05 DIAGNOSIS — Z79.4 UNCONTROLLED TYPE 2 DIABETES MELLITUS WITH HYPERGLYCEMIA, WITH LONG-TERM CURRENT USE OF INSULIN: Primary | ICD-10-CM

## 2025-06-05 DIAGNOSIS — E11.65 UNCONTROLLED TYPE 2 DIABETES MELLITUS WITH HYPERGLYCEMIA, WITH LONG-TERM CURRENT USE OF INSULIN: Primary | ICD-10-CM

## 2025-06-05 PROCEDURE — 99999PBSHW PR PBB SHADOW TECHNICAL ONLY FILED TO HB: Mod: PBBFAC,,,

## 2025-06-05 PROCEDURE — 99999 PR PBB SHADOW E&M-EST. PATIENT-LVL I: CPT | Mod: PBBFAC,,,

## 2025-06-05 PROCEDURE — G0108 DIAB MANAGE TRN  PER INDIV: HCPCS | Mod: PBBFAC

## 2025-06-05 PROCEDURE — 99211 OFF/OP EST MAY X REQ PHY/QHP: CPT | Mod: PBBFAC

## 2025-06-05 PROCEDURE — 95249 CONT GLUC MNTR PT PROV EQP: CPT | Mod: PBBFAC

## 2025-06-13 DIAGNOSIS — M25.512 CHRONIC LEFT SHOULDER PAIN: ICD-10-CM

## 2025-06-13 DIAGNOSIS — G54.6 PHANTOM LIMB PAIN: ICD-10-CM

## 2025-06-13 DIAGNOSIS — G89.29 CHRONIC LEFT SHOULDER PAIN: ICD-10-CM

## 2025-06-13 RX ORDER — TRAMADOL HYDROCHLORIDE 50 MG/1
50 TABLET, FILM COATED ORAL EVERY 12 HOURS PRN
Qty: 60 TABLET | Refills: 0 | Status: SHIPPED | OUTPATIENT
Start: 2025-06-13

## 2025-06-13 NOTE — TELEPHONE ENCOUNTER
Copied from CRM #[id number]. Topic: Medications - Medication Refill  >> Jun 13, 2025  2:10 PM Chapin wrote:  methocarbamoL (ROBAXIN) 500 MG Tab, traMADoL (ULTRAM) 50 mg tablet//Patient stated that she has enough medication to last until Sunday. Please put in the refills today.     .  [id number] [place] [address] [place] [address] [address]  Phone: [phone] Fax: [phone]    Patient's Callback: .[phone]

## 2025-06-13 NOTE — TELEPHONE ENCOUNTER
No care due was identified.  [facility] Embedded Care Due Messages. Reference number: [id number].   6/13/2025 2:21:53 PM CDT

## 2025-06-16 DIAGNOSIS — Z89.512 S/P BKA (BELOW KNEE AMPUTATION) UNILATERAL, LEFT: ICD-10-CM

## 2025-06-16 RX ORDER — METHOCARBAMOL 500 MG/1
TABLET, FILM COATED ORAL
Qty: 45 TABLET | Refills: 1 | Status: SHIPPED | OUTPATIENT
Start: 2025-06-16

## 2025-06-16 NOTE — TELEPHONE ENCOUNTER
No care due was identified.  [facility] Embedded Care Due Messages. Reference number: [id number].   6/16/2025 4:20:29 PM CDT

## 2025-06-22 DIAGNOSIS — J44.9 CHRONIC OBSTRUCTIVE PULMONARY DISEASE, UNSPECIFIED COPD TYPE: ICD-10-CM

## 2025-06-22 NOTE — TELEPHONE ENCOUNTER
No care due was identified.  [facility] Embedded Care Due Messages. Reference number: [id number].   6/22/2025 11:08:14 AM CDT

## 2025-06-23 RX ORDER — ALBUTEROL SULFATE 90 UG/1
2 INHALANT RESPIRATORY (INHALATION) EVERY 6 HOURS PRN
Qty: 6.7 G | Refills: 2 | Status: SHIPPED | OUTPATIENT
Start: 2025-06-23

## 2025-06-23 NOTE — TELEPHONE ENCOUNTER
Refill Routing Note   Medication(s) are not appropriate for processing by [facility] for the following reason(s):        New or recently adjusted medication  ED/Hospital Visit since last OV with provider    [facility] action(s):  Defer        Medication Therapy Plan: A 90 DAY SUPPLY HAS NOT BEEN PRESCRIBED BY THE PCP    Extended chart review required: Yes     Appointments  past 12m or future 3m with PCP    Date Provider   Last Visit   3/25/2025 Donaldo Pena MD   Next Visit   7/23/2025 Donaldo Pena MD   ED visits in past 90 days: 1        Note composed:10:02 AM 06/23/2025

## 2025-07-01 ENCOUNTER — CLINICAL SUPPORT (OUTPATIENT)
Dept: DIABETES | Facility: CLINIC | Age: 55
End: 2025-07-01
Payer: MEDICAID

## 2025-07-01 DIAGNOSIS — Z79.4 UNCONTROLLED TYPE 2 DIABETES MELLITUS WITH HYPERGLYCEMIA, WITH LONG-TERM CURRENT USE OF INSULIN: Primary | ICD-10-CM

## 2025-07-01 DIAGNOSIS — E11.65 UNCONTROLLED TYPE 2 DIABETES MELLITUS WITH HYPERGLYCEMIA, WITH LONG-TERM CURRENT USE OF INSULIN: Primary | ICD-10-CM

## 2025-07-01 PROCEDURE — G0108 DIAB MANAGE TRN  PER INDIV: HCPCS | Mod: PBBFAC

## 2025-07-01 PROCEDURE — 99999PBSHW PR PBB SHADOW TECHNICAL ONLY FILED TO HB: Mod: PBBFAC,,,

## 2025-07-01 NOTE — Clinical Note
Mohit Galicia, I am seeing Ms. Natarajan for a follow-up visit. Her dexcom report shows her Ave BS is 360. She's taking Lantus 30U daily and Novolog 8U +SSI w/meals. She also takes Metformin 1000mg BID. She's rotating her injection site and  says she doesn't eat high carb meals. Her Dexcom report is in  for your viewing if you'd like to make any adjustments. She'll be here until 9:30am. Thanks. -Lesa

## 2025-07-01 NOTE — PROGRESS NOTES
Diabetes Care Specialist Progress Note  Author: Lesa Buckner RN  Date: 7/1/2025        Intake  Program Intake  Reason for Diabetes Program Visit:: Intervention  Type of Intervention:: Individual  Individual: Education  Education: Other, Self-Management Skill Review (Medication Therapy)  Current diabetes risk level:: high    Current Diabetes Treatment: DM Injectables, Oral Medications, Insulin  Oral Medication Type/Dose: Metformin 1000mg BID  DM Injectables Type/Dose: Ozempic 2mg weekly  Method of insulin delivery?: Injections  Injection Type: Pens  Pen Type/Dose: Dose change: Lantus 40U and Novolog 15/15/20 U TID w/meals + SSI    Continuous Glucose Monitoring  Patient has CGM: Yes  Personal CGM type:: Dexcom G7 per reciever  GMI Date: 07/01/25  GMI Value: 11.9 %    Lab Results   Component Value Date    HGBA1C 11.6 (H) 03/27/2025     Lifestyle Coping Support & Clinical  Lifestyle/Coping/Support  Psychosocial/Coping Skills Assessment Completed: : No  Deffered due to:: Time  Area of need?: Deferred    Diabetes Self-Management Skills Assessment  Medication Skills Assessment  Patient is able to identify current diabetes medications, dosages, and appropriate timing of medications.: yes  Patient reports problems or concerns with current medication regimen.: no  Patient is  aware that some diabetes medications can cause low blood sugar?: Yes  Medication Skills Assessment Completed:: Yes  Assessment indicates:: Instruction Needed  Area of need?: Yes    Diabetes Disease Process/Treatment Options  Diabetes Disease Process/Treatment Options: Skills Assessment Completed: No  Deferred due to:: Time  Area of need?: Deferred    Nutrition/Healthy Eating  Nutrition/Healthy Eating Skills Assessment Completed:: No  Assessment indicates:: Instruction Needed  Area of need?: Yes    Physical Activity/Exercise  Physical Activity/Exercise Skills Assessment Completed: : No  Deffered due to:: Time  Area of need?: Deferred    Home Blood  Glucose Monitoring  Patient states that blood sugar is checked at home daily.: yes  Monitoring Method:: personal continuous glucose monitor  Personal CGM type:: Dexcom G7 per reciever   What is your current Time in Range?: 0%  What is your A1c Target?: 7.0%  Home Blood Glucose Monitoring Skills Assessment Completed: : Yes  Assessment indicates:: Instruction Needed, Knowledge deficit  Area of need?: Yes    Acute Complications  Acute Complications Skills Assessment Completed: : No  Assessment indicates:: Instruction Needed  Area of need?: Yes    Chronic Complications  Chronic Complications Skills Assessment Completed: : No  Deferred due to:: Time  Area of need?: Deferred      Assessment Summary and Plan    Based on today's diabetes care assessment, the following areas of need were identified:      Identified Areas of Need      Medication/Current Diabetes Treatment: Yes- Discussed elevated BS, insulin injection technique, and the MOA of long/short acting insulin and also insulin/meal timing. Pt verbalized understanding. MD made aware of elevations; new orders noted.    Lifestyle Coping/Support: Deferred   Diabetes Disease Process/Treatment Options: Deferred   Nutrition/Healthy Eating: Yes - see care planning   Physical Activity/Exercise: Deferred    Home Blood Glucose Monitoring: Yes - see care planning   Acute Complications: Yes - Discussed low blood sugar values, prevention, detection, signs and symptoms, and treatment of hypoglycemia following rule of 15.    Chronic Complications: Deferred   Today's interventions were provided through individual discussion, instruction, and written materials were provided.      Patient verbalized understanding of instruction and written materials.  Pt was able to return back demonstration of instructions today. Patient understood key points, needs reinforcement and further instruction.     Diabetes Self-Management Care Plan:    Today's Diabetes Self-Management Care Plan was  developed with Audrey's input. Audrey has agreed to work toward the following goal(s) to improve his/her overall diabetes control.      Care Plan: Diabetes Management   Updates made since 7/1/2024 12:00 AM        Problem: Blood Glucose Self-Monitoring         Goal: Patient agrees to change Dexcom G7 sensor every 10 days and review blood sugars at least 3 times per day.    Start Date: 5/22/2025   Expected End Date: 6/5/2025   This Visit's Progress: On track   Recent Progress: On track   Priority: High   Barriers: No Barriers Identified   Note:    5/22/25- - - - DEXCOM G7 CGM TRAINING per reciever  Patient referred to clinic today for Dexcom G7 continuous glucose sensor system training. Pt used the dexcom  and reviewed video on starting dexcom G7 using the . Also reviewed instructions per Dexcom G7 educational insert.   Overview:  5min glucose reading updates, trending arrows, BG graph screens, battery life indicator, Blue Tooth Symbol.  Menus: trend Graph, start sensor, enter BG, events, Alerts, Settings, Shutdown, Stop Sensor   Settings:                          * Urgent Low: 55 mg/dL                          * Urgent Low Soon: on                          * Low alert: 70 mg/dl repeat 15 min                          * High alert: 300 mg/dl                           * Rise rate: off                          * Fall Rate: off                          * Signal Loss: on repeat 20 min                          * No Reading: on repeat 20 min                          * Always sound: on  Reviewed additional setting options with patient, including Graph Height and Transmitter ID. Dexcom G7 was paired.    Reviewed where to find sensor insertion time and sensor expiration date.   Discussed no need to calibrate sensor during the entirety of the 10 day wear. Discussed that pt can calibrate sensor if desired, but at that time she will need to continue calibrating every 12 hours for sensor to remain accurate.  Reviewed appropriate calibration techniques.  Reviewed Dexcom G7 site selection. Site selected and prepped using aseptic technique Inserted to back of left upper arm. Transmitter/sensor placed per instructions.  Patient able to demonstrate without difficulty.  Encouraged to review manual prior to starting another sensor.    range 20 feet, but the first 3 hrs keep within 3 feet of transmitter.  Pt instructed on lag time of interstitial fluid from CBG and was advised to tx hypoglycemia and dose insulin based on SMBG values.  Dexcom technical support contact number given and examples of when to contact them discussed. Patient advised to always check glucose with glucometer should readings do not match symptoms felt (ex. Hypo or hyperglycemia).     Care Plan Update 6/5/25 - - Pt had issues with sensor working properly. She's went through 3 sensors since her last visit. She replaced another sensor in the office today and displayed proper technique. Dexcom tech support was called to report issue; replacement sensors will be sent to patient's home. The  was uploaded and we discussed BS elevations in relation to food and/or possible causes. Also discussed ways to improve and/or prevent hypoglycemia.     Care Plan Update 7/1/25 - - Pt's Dexcom  was uploaded and we discussed BS elevations in relation to food and/or possible causes. Also discussed ways to improve and/or prevent hypoglycemia. Pt's TIR was 0% and GMI 11.9%.           Problem: Healthy Eating         Goal: Eat 2-3 meals daily with 30-45g/2-3 servings of Carbohydrate per meal. Limit snacking in between meal to 1 serving/15 grams or up to 20 grams.    Start Date: 6/5/2025   Expected End Date: 7/1/2025   This Visit's Progress: No change   Priority: Medium   Barriers: No Barriers Identified   Note:    6/5/25 - - Patient eats three meals a day. We discussed the importance of eating balanced meals with vegetables, fruits, lean meats, and whole  grains and eating a protein at each meal and include non-starchy vegetables at lunch/dinner. We concentrated on portion sizes and overall meal planning with various choices for meals. We discussed carb sources of foods, appropriate amount of carbs to have at meals/snacks and acceptable serving sizes of individual carb items. Obtained a 24-hr food recall from patient. We discussed various meal plan options to promote healthy eating.      - - Practiced reading food labels on various food items with patient focusing on serving size and total carbohydrate intake (not sugar intake). Instructed pt to aim for evenly spaced meals or a small carb snack in place of a missed meal. Written education information provided to patient for use at home. Pt verbalized understanding of all the above.    Care Plan Update 7/1/25 - - Discussed carb sources of food and serving sizes. Discussed portion control and timing of carbs as well as spreading carbs out throughout the day. Pt verbalized understanding of all the above.       Task: Reviewed the sources and role of Carbohydrate, Protein, and Fat and how each nutrient impacts blood sugar. Completed 6/5/2025        Task: Provided visual examples using dry measuring cups, food models, and other familiar objects such as computer mouse, deck or cards, tennis ball etc. to help with visualization of portions. Completed 6/5/2025     Task: Recommended replacing beverages containing high sugar content with noncaloric/sugar free options and/or water. Completed 6/5/2025        Task: Review the importance of balancing carbohydrates with each meal using portion control techniques to count servings of carbohydrate and label reading to identify serving size and amount of total carbs per serving. Completed 6/5/2025        Task: Provided Sample plate method and reviewed the use of the plate to estimate amounts of carbohydrate per meal. Completed 6/5/2025        Follow Up Plan   Follow up in about 22 days  (around 7/23/2025) for f/u new insulin regimen and overall diabetes management.    Today's care plan and follow up schedule was discussed with patient.  Audrey verbalized understanding of the care plan, goals, and agrees to follow up plan.        The patient was encouraged to communicate with his/her health care provider/physician and care team regarding his/her condition(s) and treatment.  I provided the patient with my contact information today and encouraged to contact me via phone or [facility] Patient Portal as needed.     Length of Visit   Total Time: 60 Minutes

## 2025-07-03 RX ORDER — MELOXICAM 7.5 MG/1
TABLET ORAL
Qty: 30 TABLET | Refills: 1 | Status: SHIPPED | OUTPATIENT
Start: 2025-07-03

## 2025-07-03 NOTE — TELEPHONE ENCOUNTER
No care due was identified.  [facility] Embedded Care Due Messages. Reference number: [id number].   7/03/2025 8:03:20 AM CDT

## 2025-07-10 ENCOUNTER — TELEPHONE (OUTPATIENT)
Dept: FAMILY MEDICINE | Facility: CLINIC | Age: 55
End: 2025-07-10
Payer: MEDICAID

## 2025-07-10 NOTE — TELEPHONE ENCOUNTER
Copied from CRM #[id number]. Topic: General Inquiry - Return Call  >> Jul 10, 2025 12:47 PM Fiona wrote:  Type:  Patient Returning Call    Who Called: self    Who Left Message for Patient: Josefa     Does the patient know what this is regarding?:yes     Would the patient rather a call back or a response via My [facility]? call    Best Call Back Number:.[phone] (home)     Additional Information:

## 2025-07-14 ENCOUNTER — TELEPHONE (OUTPATIENT)
Dept: FAMILY MEDICINE | Facility: CLINIC | Age: 55
End: 2025-07-14
Payer: MEDICAID

## 2025-07-14 DIAGNOSIS — G54.6 PHANTOM LIMB PAIN: ICD-10-CM

## 2025-07-14 DIAGNOSIS — M25.512 CHRONIC LEFT SHOULDER PAIN: ICD-10-CM

## 2025-07-14 DIAGNOSIS — G89.29 CHRONIC LEFT SHOULDER PAIN: ICD-10-CM

## 2025-07-14 DIAGNOSIS — Z89.512 S/P BKA (BELOW KNEE AMPUTATION) UNILATERAL, LEFT: ICD-10-CM

## 2025-07-14 RX ORDER — METHOCARBAMOL 500 MG/1
TABLET, FILM COATED ORAL
Qty: 45 TABLET | Refills: 1 | Status: SHIPPED | OUTPATIENT
Start: 2025-07-14

## 2025-07-14 RX ORDER — TRAMADOL HYDROCHLORIDE 50 MG/1
50 TABLET, FILM COATED ORAL EVERY 12 HOURS PRN
Qty: 60 TABLET | Refills: 0 | Status: SHIPPED | OUTPATIENT
Start: 2025-07-14

## 2025-07-14 NOTE — TELEPHONE ENCOUNTER
Copied from CRM #[id number]. Topic: Medications - Medication Refill  >> Jul 14, 2025  3:09 PM Med Assistant Nury wrote:  Type: RX Refill Request    Who Called:  patient    Have you contacted your pharmacy: no    Refill or New Rx: refill    RX Name and Strength:  Tramadol 50mg      RX Name and Strength:  methocarbamoL (ROBAXIN) 500 MG Tab       Is this a 30 day or 90 day RX: 30    Preferred Pharmacy with phone number:  [id number] [place], [place] - [address] AT [address]     Local or Mail Order: local    Ordering Provider:  Donaldo Pena MD    Would the patient rather a call back or a response via [facility]?  call    Best Call Back Number: [phone] (M)

## 2025-07-15 NOTE — H&P
General Surgery History and Physical  [facility]    Patient's Name/Date of Birth: Volodymyr Hurst / 1966    Date: July 15, 2025     Surgeon: Emerald Turk MD    PCP: Jensen Madden DO     Chief Complaint: Abdominal pain    HPI:   Volodymyr Hurst is a 59 y.o. male who presents for evaluation of abdominal pain.  The pain is generalized.  It is associated with nausea, dry heaving, constipation and diarrhea intermittent.  The patient has a history of endoscopy by me which revealed hiatal hernia.  He subsequently had hiatal hernia repair performed about 3-1/2 months ago.  He denies any dysphagia.  He is a been able to eat and take p.o. without difficulty.  The pain however persists in the upper abdomen and radiates to the lower abdomen.  He had CT screening of the lung that revealed possible cholelithiasis.  He has never had further workup of his gallbladder.    Patient Active Problem List   Diagnosis    Coronary artery disease involving native coronary artery of native heart with angina pectoris    Obesity    Ischemic cardiomyopathy    Hypertension    Hyperlipidemia    CKD (chronic kidney disease) stage 3, GFR 30-59 ml/min ([facility])    RAMA (obstructive sleep apnea)    Type 2 diabetes mellitus with hyperglycemia, with long-term current use of insulin ([facility])    History of coronary artery bypass graft    Generalized anxiety disorder with panic attacks    COPD (chronic obstructive pulmonary disease) ([facility])    Elevated lipase    Gastroesophageal reflux disease    Chest pain    Hiatal hernia    GERD (gastroesophageal reflux disease)       Past Medical History:   Diagnosis Date    CAD (coronary artery disease)     Chest pain 12/16/2012    CKD (chronic kidney disease) stage 3, GFR 30-59 ml/min ([facility])     COPD (chronic obstructive pulmonary disease) ([facility])     Diabetes ([facility])     Gastroesophageal reflux disease 06/08/2021    Generalized anxiety disorder with panic attacks 04/11/2020    Gout      [facility]  History & Physical    Patient Name: Audrey Natarajan  MRN: [id number]  Patient Class: IP- Inpatient  Admission Date: 5/4/2022  Attending Physician: Scott Fuller MD   Primary Care Provider: Donaldo Pena MD         Patient information was obtained from patient, relative(s), past medical records and ER records.     Subjective:     Principal Problem:Diabetic foot ulcer associated with type 2 diabetes mellitus    Chief Complaint:   Chief Complaint   Patient presents with    Wound Infection     Pt seen by podiatrist this morning. And sent to ED for possible admission/surgery to right foot for non healing wound. Bandage and ortho shoe in place. Foul smelling odor noted. Subjective fever this morning. Took tylenol at 0600        HPI: Ms. Natarajan is a 51-year-old female with extensive past medical history including type 2 diabetes, hypertension, CKD, bipolar disorder, and recurrent foot infections who presents to the emergency department for evaluation of pain and worsening redness of her foot. Patient recently discharged from [facility] on April 26 after a complicated hospital stay due to psychosis and diabetic foot ulcer growing MRSA.  She was discharged home with her stepdaughter to continue antibiotics and follow-up.  The patient now states that her stepdaughter had taken her medications and hid them from her.  She states she just found her medications 3 days ago and started retaking her antibiotics but in the meantime, her feet wounds have worsened and she has noticed increased swelling and redness going up her legs.  She is also in significant pain.  She is very tearful.  She has felt feverish but no chills.  She denies any urinary symptoms.    In the emergency department, she was found to significant foot wounds.  See media tab.  She was also found to significant lab abnormalities including a WBC count of 20.5 1000, a sodium of 118, procalcitonin 0.34, ESR  "115 and . She was started IV antibiotics to cover her history of MRSA.  Medications were reviewed from previous hospital stay and restarted.  Podiatry was consulted.  She was admitted to hospital medicine for further management.      I spoke with her sister Anitra, and patient has not been taking her medications and has been accusing her stepdaughter - these are not true statements, she feels like she is not able to take care of herself.       Past Medical History:   Diagnosis Date    ADHD (attention deficit hyperactivity disorder)     Arthritis     Asthma     Bipolar 1 disorder     Cataract     Cigarette smoker     COPD (chronic obstructive pulmonary disease)     Coronary artery disease     A fib    Depression     bipolar manic depresson    Diabetes mellitus     Diabetic foot ulcers     Diabetic neuropathy     DVT of lower extremity, bilateral 07/2013    bilateral LE DVT. Prospect filter placed.     Encounter for blood transfusion     History of blood clots 1. Left Leg=2003; 2.Bilateral Groin=Blood Clots= 5 or 6/ 2013 & 7/2013; 3. LLL of Lung=7/2013;  4. Lt. Lower Leg=7/2013.     Pt. had 1st Blood Clot after Zmmzcxseffsy=1631, & Last=2013. Prospect Filter= Rt.Lateral Neck.    HTN (hypertension) 06/06/2013    Pt states that she does not have hypertension    Hypercholesteremia     Irregular heartbeat     Neuromuscular disorder     neuropathy feet    Obese     PE (pulmonary embolism) 07/2013    bilat LE DVT.     Restless leg syndrome        Past Surgical History:   Procedure Laterality Date    ABDOMINAL SURGERY  2010    gastric sleeve    BILATERAL OOPHORECTOMY Bilateral 1/12/2015    CHOLECYSTECTOMY      Green' s filter Right 7/4/2012    Right Neck & Tunneled Down.    HERNIA REPAIR      "[place] [facility] [facility] around th [place].", pt. states    LAPAROSCOPIC CHOLECYSTECTOMY N/A 9/10/2020    Procedure: CHOLECYSTECTOMY, LAPAROSCOPIC;  Surgeon: Montrell Gutierrez MD;  " Location: [facility] OR;  Service: General;  Laterality: N/A;  RN PREOP ----COVID Negative      OVARIAN CYST REMOVAL  3/13/2014    HI REMOVAL OF OVARY/TUBE(S)      SPLENECTOMY, TOTAL  2003    TONSILLECTOMY      as a child    TYMPANOSTOMY TUBE PLACEMENT  1976    VEIN SURGERY      Lt leg       Review of patient's allergies indicates:   Allergen Reactions    Morphine Other (See Comments)     Patient had a psychotic episode after taking Morphine  Agitation, hallucinations    Penicillins Anaphylaxis     itching    Januvia [sitagliptin] Hives       No current facility-administered medications on file prior to encounter.     Current Outpatient Medications on File Prior to Encounter   Medication Sig    acetaminophen (TYLENOL) 500 MG tablet Take 2 tablets (1,000 mg total) by mouth every 6 (six) hours as needed for Pain.    albuterol (PROVENTIL/VENTOLIN HFA) 90 mcg/actuation inhaler Inhale 2 puffs into the lungs every 6 (six) hours as needed for Wheezing. Use with spacer  Dispense with 1 spacer    albuterol-ipratropium (DUO-NEB) 2.5 mg-0.5 mg/3 mL nebulizer solution Take 3 mLs by nebulization every 6 (six) hours as needed for Wheezing or Shortness of Breath. Rescue    apixaban (ELIQUIS) 5 mg Tab Take 1 tablet (5 mg total) by mouth 2 (two) times daily.    aspirin 81 MG Chew Take 1 tablet (81 mg total) by mouth once daily.    dicyclomine (BENTYL) 20 mg tablet Take 1 tablet (20 mg total) by mouth every 6 (six) hours.    divalproex (DEPAKOTE) 250 MG EC tablet Take 5 tablets (1,250 mg total) by mouth every evening.    divalproex (DEPAKOTE) 500 MG TbEC Take 1 tablet (500 mg total) by mouth once daily. PO QAM    doxycycline (VIBRAMYCIN) 100 MG Cap Take 1 capsule (100 mg total) by mouth every 12 (twelve) hours. for 8 days    DUPIXENT  mg/2 mL PnIj SMARTSI Milligram(s) SUB-Q Every 2 Weeks    EPITOL 200 mg tablet Take 200 mg by mouth 2 (two) times a day.    famotidine (PEPCID) 20 MG tablet Take  20 mg by mouth 2 (two) times daily.    fluticasone propionate (FLONASE) 50 mcg/actuation nasal spray 1 spray (50 mcg total) by Each Nostril route 2 (two) times daily.    furosemide (LASIX) 20 MG tablet TAKE 1 TABLET(20 MG) BY MOUTH EVERY DAY    gabapentin (NEURONTIN) 300 MG capsule TAKE 2 CAPSULES(600 MG) BY MOUTH TWICE DAILY    hydrOXYzine (ATARAX) 50 MG tablet Take 50 mg by mouth 4 (four) times daily as needed.    ibuprofen (ADVIL,MOTRIN) 600 MG tablet TAKE 1 TABLET(600 MG) BY MOUTH EVERY 8 HOURS AS NEEDED FOR PAIN OR BACK PAIN    lisinopriL 10 MG tablet Take 1 tablet (10 mg total) by mouth once daily.    loratadine (CLARITIN) 10 mg tablet Take 1 tablet (10 mg total) by mouth once daily.    magnesium oxide (MAG-OX) 400 mg (241.3 mg magnesium) tablet Take 1 tablet (400 mg total) by mouth 2 (two) times daily.    metFORMIN (GLUCOPHAGE) 1000 MG tablet TAKE 1 TABLET(1000 MG) BY MOUTH TWICE DAILY WITH MEALS    metoprolol tartrate (LOPRESSOR) 50 MG tablet TAKE 1 TABLET(50 MG) BY MOUTH TWICE DAILY    metroNIDAZOLE (FLAGYL) 500 MG tablet Take 1 tablet (500 mg total) by mouth every 8 (eight) hours. for 8 days    OLANZapine (ZYPREXA) 10 MG tablet Take 1 tablet (10 mg total) by mouth every 8 (eight) hours as needed (Agitation).    OPTICHAMBER BIGG LG MASK Spcr Inhale into the lungs.    pantoprazole (PROTONIX) 40 MG tablet Take 1 tablet (40 mg total) by mouth once daily.    polyvinyl alcohol, artificial tears, (LIQUIFILM TEARS) 1.4 % ophthalmic solution Place 1 drop into both eyes 4 (four) times daily.    pravastatin (PRAVACHOL) 40 MG tablet TAKE 1 TABLET(40 MG) BY MOUTH EVERY EVENING    risperiDONE (RISPERDAL M-TABS) 3 MG disintegrating tablet Take 1 tablet (3 mg total) by mouth 2 (two) times daily.    senna (SENOKOT) 8.6 mg tablet Take 1 tablet by mouth 2 (two) times a day.    blood sugar diagnostic Strp To check BG two  times daily, to use with insurance preferred meter (Patient taking differently: To  check BG two  times daily, to use with insurance preferred meter)    blood-glucose meter kit To check BG once daily, to use with insurance preferred meter    blood-glucose meter kit To check BG two times daily, to use with insurance preferred meter    fluticasone-salmeterol diskus inhaler 250-50 mcg Inhale 1 puff into the lungs 2 (two) times daily. Controller    hydrOXYzine pamoate (VISTARIL) 50 MG Cap Take 1 capsule (50 mg total) by mouth 3 (three) times daily.    lancets Misc To check BG two times daily, to use with insurance preferred meter    multivitamin Tab Take 1 tablet by mouth once daily.    TRUE METRIX GLUCOSE METER Misc CHECK BLOOD SUGAR TWICE DAILY    [DISCONTINUED] diclofenac sodium (VOLTAREN) 1 % Gel Apply 2 g topically 4 (four) times daily as needed (Apply to painful area up to 4 times a day as needed for pain). Apply to painful area 4 times a day as needed for pain    [DISCONTINUED] folic acid (FOLVITE) 1 MG tablet Take 1 tablet (1 mg total) by mouth once daily.    [DISCONTINUED] nystatin (NYSTOP) powder APPLY TOPICALLY TO AXILLA AND BREAST FOLDS TWICE DAILY    [DISCONTINUED] QUEtiapine (SEROQUEL) 200 MG Tab Take 1 tablet (200 mg total) by mouth before breakfast.     Family History       Problem Relation (Age of Onset)    Cataracts Father    Diabetes Father, Paternal Grandfather    Heart disease Father, Paternal Grandfather    Hypertension Father    No Known Problems Mother, Sister, Brother, Maternal Aunt, Maternal Uncle, Paternal Aunt, Paternal Uncle, Maternal Grandfather    Ovarian cancer Maternal Grandmother, Paternal Grandmother          Tobacco Use    Smoking status: Current Every Day Smoker     Packs/day: 1.00     Years: 37.00     Pack years: 37.00     Types: Cigarettes     Last attempt to quit: 2020     Years since quittin.4    Smokeless tobacco: Never Used    Tobacco comment: Enrolled in the [employer] on 5/3/14 ([facility] Member ID # [id number]). Ambulatory referral to  Smoking Cessation Program   Substance and Sexual Activity    Alcohol use: No     Alcohol/week: 0.0 standard drinks    Drug use: No    Sexual activity: Yes     Partners: Male     Review of Systems   Constitutional:  Positive for fever. Negative for activity change and chills.   HENT:  Negative for congestion, rhinorrhea, sinus pressure and trouble swallowing.    Eyes:  Negative for visual disturbance.   Respiratory:  Negative for cough and shortness of breath.    Cardiovascular:  Positive for leg swelling. Negative for chest pain.   Gastrointestinal:  Negative for abdominal pain, constipation, diarrhea, nausea and vomiting.        Black stool   Endocrine: Negative for polyuria.   Genitourinary:  Negative for dysuria, frequency, hematuria and vaginal pain.   Musculoskeletal:  Positive for arthralgias and myalgias.   Skin:  Positive for rash and wound.   Neurological:  Negative for dizziness, seizures, weakness and headaches.   Psychiatric/Behavioral:  Positive for behavioral problems. The patient is nervous/anxious.    Objective:     Vital Signs (Most Recent):  Temp: 97.7 °F (36.5 °C) (05/04/22 1541)  Pulse: 74 (05/04/22 1541)  Resp: 20 (05/04/22 1541)  BP: (!) 101/57 (05/04/22 1541)  SpO2: 97 % (05/04/22 1541)   Vital Signs (24h Range):  Temp:  [97.7 °F (36.5 °C)-99.5 °F (37.5 °C)] 97.7 °F (36.5 °C)  Pulse:  [74-95] 74  Resp:  [16-20] 20  SpO2:  [96 %-100 %] 97 %  BP: (101-179)/(57-79) 101/57     Weight: 118.5 kg (261 lb 3.9 oz)  Body mass index is 42.17 kg/m².    Physical Exam  Vitals and nursing note reviewed.   Constitutional:       Appearance: Normal appearance. She is ill-appearing.   HENT:      Head: Normocephalic and atraumatic.      Mouth/Throat:      Mouth: Mucous membranes are dry.      Pharynx: Oropharynx is clear.   Eyes:      General: No scleral icterus.     Conjunctiva/sclera: Conjunctivae normal.   Cardiovascular:      Rate and Rhythm: Normal rate and regular rhythm.      Pulses: Normal pulses.       Heart sounds: Normal heart sounds.   Pulmonary:      Effort: Pulmonary effort is normal. No respiratory distress.      Breath sounds: Normal breath sounds. No wheezing or rales.   Abdominal:      General: Bowel sounds are normal. There is no distension.      Palpations: Abdomen is soft.      Tenderness: There is no abdominal tenderness.   Musculoskeletal:         General: Swelling present.   Skin:     Comments: See pictures   Neurological:      Mental Status: She is alert.   Psychiatric:         Attention and Perception: Attention normal.         Mood and Affect: Mood is depressed. Affect is tearful.         Speech: Speech normal.         Behavior: Behavior is not agitated. Behavior is cooperative.                   Significant Labs: All pertinent labs within the past 24 hours have been reviewed.    Significant Imaging: I have reviewed all pertinent imaging results/findings within the past 24 hours.    Assessment/Plan:     Hyponatremia  Presents with a sodium of 118 which is new.  She is currently asymptomatic.  Hypotonic hyponatremia - holding carbamazepine thought appears she was not taking this  - check urine sodium, serum osmalality  - will give trial of NS given patient has infection  - monitor BMP q6h  - check BNP        Anemia  Presents with hemoglobin of 7.3 with her hemoglobin being 9.7 just last week.  She does endorse some dark colored stool most notably 3 days ago.  She is on anticoagulation for blood clots.  She does have history of chronic gastritis as well.  Start on PPI IV and hold anticoagulation.  GI consulted for further evaluation      Diabetic foot ulcer associated with type 2 diabetes mellitus  She has a chronic left plantar aspect wound that has been treated in the past.  Recent hospital stay growing MRSA.  Podiatry following and planning for MRI.  She now has wounds to the bottom of her right foot that is concerning as well.  - appreciate Podiatry recommendations  Will follow-up MRI continue  IV antibiotics for now.      Acute deep vein thrombosis (DVT) of both lower extremities  - diagnosed at last hospital stay  - holding anticoagulation for concern for GI bleed      Cellulitis of lower extremity  Was recently diagnosed with a diabetic foot ulcer last month and was treated however has not been taking her medications at home and has worsening redness and swelling bilateral lower extremities.  Last culture growing MRSA.  She is on IV doxycycline and vancomycin for now.  Podiatry has been consulted for diabetic foot wound.      Hypercoagulable state  Holding anticoagulation due to concerns for bleeding    Obesity  Body mass index is 42.17 kg/m². Morbid obesity complicates all aspects of disease management from diagnostic modalities to treatment. Weight loss encouraged and health benefits explained to patient.         Leukocytosis  Likely due to foot wounds      Bipolar 1 disorder  Long history of bipolar 1 disorder with recent psychosis at last hospital stay.  Will resume regimen Risperidone, Depakote.  Holding carbamazepine at the moment due to hyponatremia  - sister Crystal states patient has not been compliant with her medications and feels that she still has some paranoia that her stepdaughter had been hiding her medications though she believes it was the patient herself.      COPD (chronic obstructive pulmonary disease)  Stable, resume home medications        VTE Risk Mitigation (From admission, onward)         Ordered     IP VTE HIGH RISK PATIENT  Once         05/04/22 1259     Place sequential compression device  Until discontinued         05/04/22 1259     Place sequential compression device  Until discontinued         05/04/22 1259                   Scott Fuller MD  Department of Hospital Medicine   [facility] - Telemetry

## 2025-07-17 DIAGNOSIS — J44.9 CHRONIC OBSTRUCTIVE PULMONARY DISEASE, UNSPECIFIED COPD TYPE: ICD-10-CM

## 2025-07-17 RX ORDER — FLUTICASONE PROPIONATE AND SALMETEROL 250; 50 UG/1; UG/1
1 POWDER RESPIRATORY (INHALATION) 2 TIMES DAILY
Qty: 60 EACH | Refills: 2 | Status: SHIPPED | OUTPATIENT
Start: 2025-07-17

## 2025-07-17 NOTE — TELEPHONE ENCOUNTER
Care Due:                  Date            Visit Type   Department     Provider  --------------------------------------------------------------------------------                                [facility]                              PRIMARY      MEDICINE /  Last Visit: 03-      CARE ([facility])   INTERNAL MED   Donaldo Pena                              [facility]                              PRIMARY      MEDICINE /  Next Visit: 07-      CARE ([facility])   INTERNAL MED   Donaldo Pena                                                            Last  Test          Frequency    Reason                     Performed    Due Date  --------------------------------------------------------------------------------    Lipid Panel.  12 months..  atorvastatin.............  10-   10-    [facility] Embedded Care Due Messages. Reference number: [id number].   7/17/2025 9:29:52 AM CDT

## 2025-07-18 ENCOUNTER — CLINICAL SUPPORT (OUTPATIENT)
Dept: REHABILITATION | Facility: HOSPITAL | Age: 55
End: 2025-07-18
Payer: MEDICAID

## 2025-07-18 DIAGNOSIS — R68.89 DECREASED FUNCTIONAL ACTIVITY TOLERANCE: ICD-10-CM

## 2025-07-18 DIAGNOSIS — R53.1 WEAKNESS: Primary | ICD-10-CM

## 2025-07-18 DIAGNOSIS — M25.612 DECREASED ROM OF LEFT SHOULDER: ICD-10-CM

## 2025-07-18 PROCEDURE — 97110 THERAPEUTIC EXERCISES: CPT | Mod: PN

## 2025-07-21 NOTE — PROGRESS NOTES
Outpatient Rehab    Physical Therapy Visit    Patient Name: Audrey Natarajan  MRN: [id number]  YOB: 1970  Encounter Date: 7/18/2025    Therapy Diagnosis:   Encounter Diagnoses   Name Primary?    Weakness Yes    Decreased ROM of left shoulder     Decreased functional activity tolerance      Physician: Geoffrey Ramos NP    Physician Orders: Eval and Treat  Medical Diagnosis: Chronic neck pain  Chronic left shoulder pain  Surgical Diagnosis: Not applicable for this Episode   Surgical Date: Not applicable for this Episode  Days Since Last Surgery: Not applicable for this Episode    Visit # / Visits Authorized:  2 / 16  Insurance Authorization Period: 5/22/2025 to 8/8/2025  Date of Evaluation: 5/20/2025  Plan of Care Certification: 5/20/2025 to 9/20/2025      PT/PTA:     Number of PTA visits since last PT visit:   Time In: 0700   Time Out: 0800  Total Time (in minutes): 60   Total Billable Time (in minutes): 53    FOTO:  Intake Score (%): 64.2  Survey Score 2 (%): Not applicable for this Episode  Survey Score 3 (%): Not applicable for this Episode    Precautions:  Additional Precautions and Protocol Details: L foot amputation      Subjective   L shoulder is still bothering her and evn with medicine, it bothers her.It feels like arthritis in the shoulder joint. Pt says she feels 40% normal in the L shoulder. Pt says he has a hard time trying to wheel herself but has been trying to use to prevent stiffness. She has been doing her exercises 2x a day..         Objective      Shoulder Range of Motion  Left Shoulder   Active (deg) Passive (deg) Pain   Flexion 90       Extension         Scaption         ABduction 95       ADduction         Horizontal ABduction         Horizontal ADduction         External Rotation (Shoulder ABducted 0 degrees)         External Rotation (Shoulder ABducted 45 degrees)         External Rotation (Shoulder ABducted 90 degrees)         Internal Rotation (Shoulder ABducted 0  "degrees)         Internal Rotation (Shoulder ABducted 45 degrees)         Internal Rotation (Shoulder ABducted 90 degrees)                          Treatment:  Therapeutic Exercise  TE 5: Upper trap stretch 6 x 10 sec  TE 6: Levator stretch 6 x 10 sec  TE 7: No money RTB 2 x 10 5"  TE 8: Supine shoulder flexion 2# dowel, L AAROM shoulder ABD  2# dowel each x 20  TE 9: Pulley flex/scap 3 min  Manual Therapy all billed per Medicaid   MT 1: L shoulder PROM  MT 2: L shoulder GHJ mobs  MT 3: L levator/ trap manual stretching      Time Entry(in minutes):  Therapeutic Exercise Time Entry: 53    Assessment & Plan   Assessment: Pt reassessed today and she feels about 40% in her shoulder. Continues to have limitations in ROM and functional mobility in the wheelchair. Passively, she has more range available which is nearly WFL. However, she is moderately limited actively due to pain and weakness. Performed manual techniques followed by mobility and strengthening exercises. Will cont as tolerated to progress for pt to be independent with less difficulty for wheelchair mobility as she is L BKA awaiting a prosthesis.        The patient will continue to benefit from skilled outpatient physical therapy in order to address the deficits listed in the problem list on the initial evaluation, provide patient and family education, and maximize the patients level of independence in the home and community environments.     The patient's spiritual, cultural, and educational needs were considered, and the patient is agreeable to the plan of care and goals.           Plan: CONT POC    Goals:   Active       Functional outcome       Patient will show a significant change in DASH patient-reported outcome tool to demonstrate subjective improvement       Start:  05/20/25    Expected End:  07/20/25            Patient stated goal: decrease pain, propel wheelchair with less pain        Start:  05/20/25    Expected End:  07/20/25            Patient " will demonstrate independence in home program for support of progression       Start:  05/20/25    Expected End:  07/20/25               Hand and arm use       Patient will reach multiple directions with minimal pain and difficulty        Start:  05/20/25    Expected End:  07/20/25               Pain       Patient will report pain of 2/10 or less demonstrating a reduction of overall pain       Start:  05/20/25    Expected End:  07/20/25               Range of Motion       Patient will achieve left cervical rotation ROM increase by 5 degrees       Start:  05/20/25    Expected End:  07/20/25            Patient will achieve cervical flexion ROM increase by 5 degrees       Start:  05/20/25    Expected End:  07/20/25            Patient will achieve cervical extension ROM increase by  5 degrees       Start:  05/20/25    Expected End:  07/20/25            Patient will achieve left shoulder flexion of 150+ degrees       Start:  05/20/25    Expected End:  07/20/25            Patient will achieve left shoulder abduction of 150+ degrees       Start:  05/20/25    Expected End:  07/20/25            Patient will achieve left shoulder functional external rotation to reach base of occiput        Start:  05/20/25    Expected End:  07/20/25            Patient will achieve left shoulder functional internal rotation to reach L4       Start:  05/20/25    Expected End:  07/20/25               Strength       Patient will achieve bilateral shoulder flexion strength of 5/5       Start:  05/20/25    Expected End:  07/20/25            Patient will achieve bilateral shoulder abduction strength of 5/5       Start:  05/20/25    Expected End:  07/20/25            Patient will achieve bilateral elbow flexion strength of 5/5       Start:  05/20/25    Expected End:  07/20/25            Patient will achieve bilateral elbow extension strength of 5/5       Start:  05/20/25    Expected End:  07/20/25               functional        Pt will be able to  propel wheelchair using L UE equally with R UE with minimal to no difficulty        Start:  05/20/25    Expected End:  07/20/25                Ricky Dangelo Jr, PT

## 2025-07-23 ENCOUNTER — CLINICAL SUPPORT (OUTPATIENT)
Dept: DIABETES | Facility: CLINIC | Age: 55
End: 2025-07-23
Payer: MEDICAID

## 2025-07-23 ENCOUNTER — OFFICE VISIT (OUTPATIENT)
Dept: URGENT CARE | Facility: CLINIC | Age: 55
End: 2025-07-23
Payer: MEDICAID

## 2025-07-23 VITALS
DIASTOLIC BLOOD PRESSURE: 79 MMHG | HEIGHT: 66 IN | WEIGHT: 220 LBS | OXYGEN SATURATION: 92 % | BODY MASS INDEX: 35.36 KG/M2 | TEMPERATURE: 100 F | HEART RATE: 96 BPM | SYSTOLIC BLOOD PRESSURE: 128 MMHG

## 2025-07-23 VITALS — HEIGHT: 66 IN | WEIGHT: 220 LBS | BODY MASS INDEX: 35.36 KG/M2

## 2025-07-23 DIAGNOSIS — N30.00 ACUTE CYSTITIS WITHOUT HEMATURIA: Primary | ICD-10-CM

## 2025-07-23 DIAGNOSIS — E11.65 UNCONTROLLED TYPE 2 DIABETES MELLITUS WITH HYPERGLYCEMIA, WITH LONG-TERM CURRENT USE OF INSULIN: Primary | ICD-10-CM

## 2025-07-23 DIAGNOSIS — R35.0 URINE FREQUENCY: ICD-10-CM

## 2025-07-23 DIAGNOSIS — S01.80XA OPEN WOUND OF FACE, INITIAL ENCOUNTER: ICD-10-CM

## 2025-07-23 DIAGNOSIS — Z79.4 UNCONTROLLED TYPE 2 DIABETES MELLITUS WITH HYPERGLYCEMIA, WITH LONG-TERM CURRENT USE OF INSULIN: Primary | ICD-10-CM

## 2025-07-23 LAB
BILIRUBIN, UA POC OHS: NEGATIVE
BLOOD, UA POC OHS: NEGATIVE
CLARITY, UA POC OHS: ABNORMAL
COLOR, UA POC OHS: YELLOW
GLUCOSE, UA POC OHS: >=1000
KETONES, UA POC OHS: 15
LEUKOCYTES, UA POC OHS: ABNORMAL
NITRITE, UA POC OHS: NEGATIVE
PH, UA POC OHS: 7
PROTEIN, UA POC OHS: NEGATIVE
SPECIFIC GRAVITY, UA POC OHS: 1.02
UROBILINOGEN, UA POC OHS: 0.2

## 2025-07-23 PROCEDURE — 99999PBSHW PR PBB SHADOW TECHNICAL ONLY FILED TO HB: Mod: PBBFAC,,,

## 2025-07-23 PROCEDURE — 99211 OFF/OP EST MAY X REQ PHY/QHP: CPT | Mod: PBBFAC

## 2025-07-23 PROCEDURE — 99213 OFFICE O/P EST LOW 20 MIN: CPT | Mod: S$GLB,,,

## 2025-07-23 PROCEDURE — 99999 PR PBB SHADOW E&M-EST. PATIENT-LVL I: CPT | Mod: PBBFAC,,,

## 2025-07-23 PROCEDURE — 81003 URINALYSIS AUTO W/O SCOPE: CPT | Mod: QW,S$GLB,,

## 2025-07-23 PROCEDURE — G0108 DIAB MANAGE TRN  PER INDIV: HCPCS | Mod: PBBFAC

## 2025-07-23 RX ORDER — CEPHALEXIN 500 MG/1
500 CAPSULE ORAL EVERY 8 HOURS
Qty: 21 CAPSULE | Refills: 0 | Status: SHIPPED | OUTPATIENT
Start: 2025-07-23 | End: 2025-07-30

## 2025-07-23 RX ORDER — MUPIROCIN 20 MG/G
OINTMENT TOPICAL DAILY
Qty: 15 G | Refills: 0 | Status: SHIPPED | OUTPATIENT
Start: 2025-07-23

## 2025-07-23 NOTE — PROGRESS NOTES
"Subjective:      Patient ID: Audrey Natarajan is a 54 y.o. female.    Vitals:  height is 5' 6" (1.676 m) and weight is 99.8 kg (220 lb). Her oral temperature is 99.8 °F (37.7 °C). Her blood pressure is 128/79 and her pulse is 96. Her oxygen saturation is 92% (abnormal).     Chief Complaint: Urinary Frequency and Rash    Patient is here for urinary frequency onset 17 days ago and rash to the face and head with swelling/pain left side onset 2 days ago. Reports she believes she is having cellulitis to her scalp. OTC medication azo, cranberry tablets, probiotic and cold compress for rash with mild relief. Patient has history of COPD and asthma.     Urinary Frequency   This is a new problem. Episode onset: 17 days ago. The problem has been gradually worsening. The quality of the pain is described as aching and burning. She is Not sexually active. There is No history of pyelonephritis. Associated symptoms include frequency, urgency and rash. Pertinent negatives include no chills, flank pain, nausea or vomiting. Associated symptoms comments: Llq abdominal pain. Treatments tried: azo, cranberry tablets, probiotic. Her past medical history is significant for hypertension. There is no history of kidney stones, recurrent UTIs, a single kidney, STD, urinary stasis or a urological procedure.   Rash  This is a new problem. Episode onset: 2 days ago. The problem has been gradually worsening since onset. The affected locations include the face and scalp. The rash is characterized by redness. It is unknown if there was an exposure to a precipitant. Pertinent negatives include no diarrhea, fatigue, fever or vomiting. Past treatments include cold compress. The treatment provided mild relief. Her past medical history is significant for eczema. There is no history of allergies, asthma or varicella.       Constitution: Negative for chills, fatigue and fever.   Cardiovascular:  Negative for chest pain.   Gastrointestinal:  Negative " for abdominal pain, nausea, vomiting and diarrhea.   Genitourinary:  Positive for dysuria, frequency and urgency. Negative for flank pain and vaginal discharge.   Musculoskeletal:  Positive for pain.   Skin:  Positive for rash. Negative for erythema.      Objective:     Physical Exam   Constitutional: She is oriented to person, place, and time. She appears well-developed.   HENT:   Head: Normocephalic and atraumatic. Head is without abrasion, without contusion and without laceration.   Ears:   Right Ear: External ear normal.   Left Ear: External ear normal.   Nose: Nose normal.   Mouth/Throat: Oropharynx is clear and moist and mucous membranes are normal.   No erythema, swelling to scalp. Mild TTP. There is small wound to forehead with scabbing.       Comments: No erythema, swelling to scalp. Mild TTP. There is small wound to forehead with scabbing.   Eyes: Conjunctivae, EOM and lids are normal. Pupils are equal, round, and reactive to light.   Neck: Trachea normal and phonation normal. Neck supple.   Cardiovascular: Normal rate, regular rhythm and normal heart sounds.   Pulmonary/Chest: Effort normal and breath sounds normal. No stridor. No respiratory distress.   Abdominal: There is abdominal tenderness in the suprapubic area. There is no left CVA tenderness and no right CVA tenderness.   Musculoskeletal: Normal range of motion.         General: Normal range of motion.   Neurological: She is alert and oriented to person, place, and time.   Skin: Skin is warm, dry, intact and no rash. Capillary refill takes less than 2 seconds. No abrasion, No burn, No bruising, No erythema and No ecchymosis   Psychiatric: Her speech is normal and behavior is normal. Judgment and thought content normal.   Nursing note and vitals reviewed.      Assessment:     1. Acute cystitis without hematuria    2. Urine frequency    3. Open wound of face, initial encounter        Plan:     Results for orders placed or performed in visit on  07/23/25   POCT Urinalysis(Instrument)    Collection Time: 07/23/25  4:56 PM   Result Value Ref Range    Color, POC UA Yellow Yellow, Straw, Colorless    Clarity, POC UA Slight Cloudy (A) Clear    Glucose, POC UA >=1000 (A) Negative    Bilirubin, POC UA Negative Negative    Ketones, POC UA 15 (A) Negative    Spec Grav POC UA 1.020 1.005 - 1.030    Blood, POC UA Negative Negative    pH, POC UA 7.0 5.0 - 8.0    Protein, POC UA Negative Negative    Urobilinogen, POC UA 0.2 <=1.0    Nitrite, POC UA Negative Negative    WBC, POC UA Small (A) Negative     *Note: Due to a large number of results and/or encounters for the requested time period, some results have not been displayed. A complete set of results can be found in Results Review.       Acute cystitis without hematuria  -     cephALEXin (KEFLEX) 500 MG capsule; Take 1 capsule (500 mg total) by mouth every 8 (eight) hours. for 7 days  Dispense: 21 capsule; Refill: 0    Urine frequency  -     POCT Urinalysis(Instrument)    Open wound of face, initial encounter  -     mupirocin (BACTROBAN) 2 % ointment; Apply topically once daily.  Dispense: 15 g; Refill: 0            Discussed results/diagnosis/plan with patient in clinic. Strict precautions given to patient to monitor for worsening signs and symptoms. Advised to follow up with PCP or specialist.  Explained side effects of medications prescribed with patient and informed him/her to discontinue use if he/she has any side effects and to inform UC or PCP if this occurs. All questions answered. Strict ED verses clinic return precautions stressed and given in depth. Advised if symptoms worsens of fail to improve he/she should go to the Emergency Room. Discharge and follow-up instructions given verbally/printed with the patient who expressed understanding and willingness to comply with my recommendations. Patient voiced understanding and in agreement with current treatment plan. Patient exits the exam room in no acute  distress. Conversant and engaged during discharge discussion, verbalized understanding.

## 2025-07-23 NOTE — PROGRESS NOTES
"Diabetes Care Specialist Progress Note  Author: Lesa Buckner RN  Date: 7/23/2025        Intake  Program Intake  Reason for Diabetes Program Visit:: Intervention  Type of Intervention:: Individual  Individual: Education  Education: Self-Management Skill Review, Pattern Management  Current diabetes risk level:: high  In the last month, have you used the ER or been admitted to the hospital: No  Permission to speak with others about care:: yes    Current Diabetes Treatment: Oral Medications, DM Injectables, Insulin  Oral Medication Type/Dose: Metformin 1000mg BID  DM Injectables Type/Dose: Ozempic 2mg weekly  Method of insulin delivery?: Injections  Injection Type: Pens  Pen Type/Dose: Lantus 40U and Novolog 15/15/20 U TID w/meals + SSI    Continuous Glucose Monitoring  Patient has CGM: Yes  Personal CGM type:: Dexcom G7 per reciever  GMI Date: 07/23/25  GMI Value: 10.5 %    Lab Results   Component Value Date    HGBA1C 11.6 (H) 03/27/2025       Weight: 99.8 kg (220 lb) (Per patient)   Height: 5' 6" (167.6 cm)   Body mass index is 35.51 kg/m².    Lifestyle Coping Support & Clinical  Lifestyle/Coping/Support  Compared to other people your age, how would you rate your health?: Good  Does anyone in your family have diabetes or does anyone in your family support you in your diabetes care?: Spouse supports diabetes care.  List anything about Diabetes that causes you stress?: Elevated blood sugars  How do you deal with stress/distress?: Get information and deal with it.  Learning Barriers:: None  Culture or Shinto beliefs that may impact ability to access healthcare: No  Psychosocial/Coping Skills Assessment Completed: : Yes  Assessment indicates:: Instruction Needed  Area of need?: No      Diabetes Self-Management Skills Assessment  Medication Skills Assessment  Patient is able to identify current diabetes medications, dosages, and appropriate timing of medications.: yes  Patient reports problems or concerns with " current medication regimen.: no  Patient is  aware that some diabetes medications can cause low blood sugar?: Yes  Medication Skills Assessment Completed:: Yes  Assessment indicates:: Instruction Needed  Area of need?: Yes    Diabetes Disease Process/Treatment Options  Diabetes Type?: Type II  When were you diagnosed?: Dx: 2003  If previous diabetes education, when/where:: 2025 X3 @ [facility]  What are your goals for this education session?: To review insulin regimen and diabetes management  Is patient aware of what causes diabetes?: Yes  Does patient understand the pathophysiology of diabetes?: Yes  Diabetes Disease Process/Treatment Options: Skills Assessment Completed: Yes  Assessment indicates:: Instruction Needed  Area of need?: No    Nutrition/Healthy Eating  Nutrition/Healthy Eating Skills Assessment Completed:: No  Assessment indicates:: Instruction Needed  Area of need?: Yes    Physical Activity/Exercise  Patient's daily activity level:: lightly active (physical therapy and chair exercises)  Patient formally exercises outside of work.: yes  Patient can identify forms of physical activity.: yes  Physical Activity/Exercise Skills Assessment Completed: : Yes  Assessment indicates:: Instruction Needed  Area of need?: Yes    Home Blood Glucose Monitoring  Patient states that blood sugar is checked at home daily.: yes  Monitoring Method:: personal continuous glucose monitor  Personal CGM type:: Dexcom G7 per reciever   What is your current Time in Range?: 2%  What is your A1c Target?: 7.0%  Home Blood Glucose Monitoring Skills Assessment Completed: : Yes  Assessment indicates:: Instruction Needed, Knowledge deficit  Area of need?: Yes    Acute Complications  Acute Complications Skills Assessment Completed: : No  Assessment indicates:: Adequate understanding  Area of need?: No    Chronic Complications  Chronic Complications Skills Assessment Completed: : No  Deferred due to:: Time  Area of need?:  Deferred      Assessment Summary and Plan    Based on today's diabetes care assessment, the following areas of need were identified:      Identified Areas of Need      Medication/Current Diabetes Treatment: Yes - Discussed timing and mechanism of action of long and rapid acting insulin. Reviewed need for rotation of injection sites. MD notified of elevated BS.    Lifestyle Coping/Support: No   Diabetes Disease Process/Treatment Options: No   Nutrition/Healthy Eating: Yes - see care planning   Physical Activity/Exercise: Yes - Continue with activity as tolerated. Discussed the benefits.    Home Blood Glucose Monitoring: Yes - see care planning   Acute Complications: No    Chronic Complications: Deferred   Today's interventions were provided through individual discussion, instruction, and written materials were provided.      Patient verbalized understanding of instruction and written materials.  Pt was able to return back demonstration of instructions today. Patient understood key points, needs reinforcement and further instruction.     Diabetes Self-Management Care Plan:    Today's Diabetes Self-Management Care Plan was developed with Audrey's input. Audrey has agreed to work toward the following goal(s) to improve his/her overall diabetes control.      Care Plan: Diabetes Management   Updates made since 7/23/2024 12:00 AM        Problem: Blood Glucose Self-Monitoring         Goal: Patient agrees to change Dexcom G7 sensor every 10 days and review blood sugars at least 3 times per day.    Start Date: 5/22/2025   Expected End Date: 6/5/2025   This Visit's Progress: No change   Recent Progress: On track   Priority: High   Barriers: No Barriers Identified   Note:    5/22/25- - - - DEXCOM G7 CGM TRAINING per reciever  Patient referred to clinic today for Dexcom G7 continuous glucose sensor system training. Pt used the dexcom  and reviewed video on starting dexcom G7 using the . Also reviewed instructions  per Dexcom G7 educational insert.   Overview:  5min glucose reading updates, trending arrows, BG graph screens, battery life indicator, Blue Tooth Symbol.  Menus: trend Graph, start sensor, enter BG, events, Alerts, Settings, Shutdown, Stop Sensor   Settings:                          * Urgent Low: 55 mg/dL                          * Urgent Low Soon: on                          * Low alert: 70 mg/dl repeat 15 min                          * High alert: 300 mg/dl                           * Rise rate: off                          * Fall Rate: off                          * Signal Loss: on repeat 20 min                          * No Reading: on repeat 20 min                          * Always sound: on  Reviewed additional setting options with patient, including Graph Height and Transmitter ID. Dexcom G7 was paired.    Reviewed where to find sensor insertion time and sensor expiration date.   Discussed no need to calibrate sensor during the entirety of the 10 day wear. Discussed that pt can calibrate sensor if desired, but at that time she will need to continue calibrating every 12 hours for sensor to remain accurate. Reviewed appropriate calibration techniques.  Reviewed Dexcom G7 site selection. Site selected and prepped using aseptic technique Inserted to back of left upper arm. Transmitter/sensor placed per instructions.  Patient able to demonstrate without difficulty.  Encouraged to review manual prior to starting another sensor.    range 20 feet, but the first 3 hrs keep within 3 feet of transmitter.  Pt instructed on lag time of interstitial fluid from CBG and was advised to tx hypoglycemia and dose insulin based on SMBG values.  Dexcom technical support contact number given and examples of when to contact them discussed. Patient advised to always check glucose with glucometer should readings do not match symptoms felt (ex. Hypo or hyperglycemia).     Care Plan Update 6/5/25 - - Pt had issues with  sensor working properly. She's went through 3 sensors since her last visit. She replaced another sensor in the office today and displayed proper technique. Dexcom tech support was called to report issue; replacement sensors will be sent to patient's home. The  was uploaded and we discussed BS elevations in relation to food and/or possible causes. Also discussed ways to improve and/or prevent hypoglycemia.     Care Plan Update 7/1/25 - - Pt's Dexcom  was uploaded and we discussed BS elevations in relation to food and/or possible causes. Also discussed ways to improve and/or prevent hypoglycemia. Pt's TIR was 0% and GMI 11.9%.    Care Plan Update 7/23/25 - - Pt's Dexcom  was uploaded and we discussed BS elevations in relation to food and/or possible causes. Also discussed ways to improve and/or prevent hypoglycemia. Pt's TIR was 2% and GMI 10.5%.MD notified of elevated. Pt to be notified if any changes.           Problem: Healthy Eating         Goal: Eat 2-3 meals daily with 30-45g/2-3 servings of Carbohydrate per meal. Limit snacking in between meal to 1 serving/15 grams or up to 20 grams.    Start Date: 6/5/2025   Expected End Date: 7/1/2025   This Visit's Progress: On track   Recent Progress: No change   Priority: Medium   Barriers: No Barriers Identified   Note:    6/5/25 - - Patient eats three meals a day. We discussed the importance of eating balanced meals with vegetables, fruits, lean meats, and whole grains and eating a protein at each meal and include non-starchy vegetables at lunch/dinner. We concentrated on portion sizes and overall meal planning with various choices for meals. We discussed carb sources of foods, appropriate amount of carbs to have at meals/snacks and acceptable serving sizes of individual carb items. Obtained a 24-hr food recall from patient. We discussed various meal plan options to promote healthy eating.      - - Practiced reading food labels on various food  items with patient focusing on serving size and total carbohydrate intake (not sugar intake). Instructed pt to aim for evenly spaced meals or a small carb snack in place of a missed meal. Written education information provided to patient for use at home. Pt verbalized understanding of all the above.    Care Plan Update 7/1/25 - - Discussed carb sources of food and serving sizes. Discussed portion control and timing of carbs as well as spreading carbs out throughout the day. Pt verbalized understanding of all the above.    Care Plan Update 7/23/25 - - Pt says she's aware of her CHO sources of food and serving sizes. Discussed portion control and timing of carbs as well as spreading carbs out throughout the day. Pt says she's eating more veggies and salads. Pt verbalized understanding of all the above.       Task: Reviewed the sources and role of Carbohydrate, Protein, and Fat and how each nutrient impacts blood sugar. Completed 6/5/2025        Task: Provided visual examples using dry measuring cups, food models, and other familiar objects such as computer mouse, deck or cards, tennis ball etc. to help with visualization of portions. Completed 6/5/2025     Task: Recommended replacing beverages containing high sugar content with noncaloric/sugar free options and/or water. Completed 6/5/2025        Task: Review the importance of balancing carbohydrates with each meal using portion control techniques to count servings of carbohydrate and label reading to identify serving size and amount of total carbs per serving. Completed 6/5/2025        Task: Provided Sample plate method and reviewed the use of the plate to estimate amounts of carbohydrate per meal. Completed 6/5/2025        Follow Up Plan   Follow up if symptoms worsen or fail to improve.    Today's care plan and follow up schedule was discussed with patient.  Audrey verbalized understanding of the care plan, goals, and agrees to follow up plan.        The patient  was encouraged to communicate with his/her health care provider/physician and care team regarding his/her condition(s) and treatment.  I provided the patient with my contact information today and encouraged to contact me via phone or [facility] Portal as needed.     Length of Visit   Total Time: 30 Minutes

## 2025-07-23 NOTE — PATIENT INSTRUCTIONS
Please take full prescription of antibiotic until complete dose.  Take for the full 7 days or symptoms will not get better.  Take antibiotic with a meal and water to decrease GI upset. Always take a probiotic or eat daily yogurt while taking an antibiotic to maintain good gut and vaginal rosenda.    You can take Pyridium (AZO) over the counter up to 3 times per day for 2 days as needed for dysuria.  Please drink plenty of fluids (water is best) within the next few days (2.7 liters or five 16 oz bottles) to flush out kidneys and bladder.  Avoid baths for the next week and only take showers.  Please practice good toileting hygiene and wipe front to back to avoid contamination to vaginal area.  Make sure to urinate after sexual intercourse to clear your urethra of bacteria.      Try extra strength tylenol (650-1000 mg) every 8 hours as needed for pain relief.      If your symptoms worsen or develop high fevers, worsening pain, please return to the clinic or follow-up with primary care provider.

## 2025-07-25 ENCOUNTER — CLINICAL SUPPORT (OUTPATIENT)
Dept: REHABILITATION | Facility: HOSPITAL | Age: 55
End: 2025-07-25
Payer: MEDICAID

## 2025-07-25 DIAGNOSIS — R53.1 WEAKNESS: Primary | ICD-10-CM

## 2025-07-25 DIAGNOSIS — R68.89 DECREASED FUNCTIONAL ACTIVITY TOLERANCE: ICD-10-CM

## 2025-07-25 DIAGNOSIS — M25.612 DECREASED ROM OF LEFT SHOULDER: ICD-10-CM

## 2025-07-25 PROCEDURE — 97110 THERAPEUTIC EXERCISES: CPT | Mod: PN

## 2025-07-25 NOTE — PROGRESS NOTES
"  Outpatient Rehab    Physical Therapy Visit    Patient Name: Audrey Natarajan  MRN: [id number]  YOB: 1970  Encounter Date: 7/25/2025    Therapy Diagnosis:   Encounter Diagnoses   Name Primary?    Weakness Yes    Decreased ROM of left shoulder     Decreased functional activity tolerance      Physician: Geoffrey Ramos NP    Physician Orders: Eval and Treat  Medical Diagnosis: Chronic neck pain  Chronic left shoulder pain  Surgical Diagnosis: Not applicable for this Episode   Surgical Date: Not applicable for this Episode  Days Since Last Surgery: Not applicable for this Episode    Visit # / Visits Authorized:  3 / 16  Insurance Authorization Period: 5/22/2025 to 8/8/2025  Date of Evaluation: 5/20/2025  Plan of Care Certification: 5/20/2025 to 9/20/2025      PT/PTA:     Number of PTA visits since last PT visit:   Time In: 0800   Time Out: 0900  Total Time (in minutes): 60   Total Billable Time (in minutes): 58    FOTO:  Intake Score (%): 64.2  Survey Score 2 (%): Not applicable for this Episode  Survey Score 3 (%): Not applicable for this Episode    Precautions:  Additional Precautions and Protocol Details: L foot amputation      Subjective   Pt says her L shoulder and pec is bothering her. She also said that the R shoulder is gradually starting to hurt similar to the L shoulder..         Objective            Treatment:  Therapeutic Exercise  TE 4: Shoulder flex wall slides 2 x 10 5"  TE 5: Upper trap stretch 6 x 10 sec  TE 6: Levator stretch 6 x 10 sec  TE 7: No money GTB 2 x 10 5"  TE 8: Supine shoulder flexion 2# dowel, L AAROM shoulder ABD  2# dowel each x 20  TE 9: Pulley flex/scap 3 min  TE 10: Seated row BTB 20 x 3"  Manual Therapy: ALL billed as therex per Medicaid   MT 1: L shoulder PROM  MT 2: L shoulder GHJ mobs  MT 4: Pec release      Time Entry(in minutes):  Therapeutic Exercise Time Entry: 58    Assessment & Plan   Assessment: Pt had some L shoulder pain and pectoralis pain as well. " Incorporated pec release as part of manual today. Discomfort but tolerable. Progressed table slides to wall for more ROM. Overall, pt tolerated therapy well with no significant increase in pain. Will cont as tolerated to PLOF.        The patient will continue to benefit from skilled outpatient physical therapy in order to address the deficits listed in the problem list on the initial evaluation, provide patient and family education, and maximize the patients level of independence in the home and community environments.     The patient's spiritual, cultural, and educational needs were considered, and the patient is agreeable to the plan of care and goals.           Plan: CONT POC    Goals:   Active       Functional outcome       Patient will show a significant change in DASH patient-reported outcome tool to demonstrate subjective improvement       Start:  05/20/25    Expected End:  07/20/25            Patient stated goal: decrease pain, propel wheelchair with less pain        Start:  05/20/25    Expected End:  07/20/25            Patient will demonstrate independence in home program for support of progression       Start:  05/20/25    Expected End:  07/20/25               Hand and arm use       Patient will reach multiple directions with minimal pain and difficulty        Start:  05/20/25    Expected End:  07/20/25               Pain       Patient will report pain of 2/10 or less demonstrating a reduction of overall pain       Start:  05/20/25    Expected End:  07/20/25               Range of Motion       Patient will achieve left cervical rotation ROM increase by 5 degrees       Start:  05/20/25    Expected End:  07/20/25            Patient will achieve cervical flexion ROM increase by 5 degrees       Start:  05/20/25    Expected End:  07/20/25            Patient will achieve cervical extension ROM increase by  5 degrees       Start:  05/20/25    Expected End:  07/20/25            Patient will achieve left shoulder  flexion of 150+ degrees       Start:  05/20/25    Expected End:  07/20/25            Patient will achieve left shoulder abduction of 150+ degrees       Start:  05/20/25    Expected End:  07/20/25            Patient will achieve left shoulder functional external rotation to reach base of occiput        Start:  05/20/25    Expected End:  07/20/25            Patient will achieve left shoulder functional internal rotation to reach L4       Start:  05/20/25    Expected End:  07/20/25               Strength       Patient will achieve bilateral shoulder flexion strength of 5/5       Start:  05/20/25    Expected End:  07/20/25            Patient will achieve bilateral shoulder abduction strength of 5/5       Start:  05/20/25    Expected End:  07/20/25            Patient will achieve bilateral elbow flexion strength of 5/5       Start:  05/20/25    Expected End:  07/20/25            Patient will achieve bilateral elbow extension strength of 5/5       Start:  05/20/25    Expected End:  07/20/25               functional        Pt will be able to propel wheelchair using L UE equally with R UE with minimal to no difficulty        Start:  05/20/25    Expected End:  07/20/25                Ricky Dangelo Jr, PT

## 2025-07-29 ENCOUNTER — TELEPHONE (OUTPATIENT)
Dept: FAMILY MEDICINE | Facility: CLINIC | Age: 55
End: 2025-07-29
Payer: MEDICAID

## 2025-07-29 NOTE — TELEPHONE ENCOUNTER
Last Office Visit Info:   The patient's last visit with Donaldo Pena MD was on 3/25/2025.    The patient's last visit in current department was on 4/16/2025.    Upcoming appointment 07/31/2025.

## 2025-07-29 NOTE — TELEPHONE ENCOUNTER
Copied from CRM #[id number]. Topic: Medications - Medication Refill  >> Jul 29, 2025  1:05 PM Med Assistant Va wrote:  Type:  RX Refill Request    Who Called: Pt  Refill or New Rx:refill   RX Name and Strength:loratadine (CLARITIN) 10 mg tablet  How is the patient currently taking it? (ex. 1XDay):Sig - Route: Take 1 tablet (10 mg total) by mouth once daily. - Oral  Is this a 30 day or 90 day RX:90  Preferred Pharmacy with phone number:  [facility] #[id number] - [address] AT [address] [address]  Phone: [phone] Fax: [phone]  Local or Mail Order:Local   Ordering Provider:  Donaldo Pena MD      Would the patient rather a call back or a response via [facility]? Callback   Best Call Back Number:[email or web address]  Additional Information: completely out

## 2025-07-31 ENCOUNTER — CLINICAL SUPPORT (OUTPATIENT)
Dept: REHABILITATION | Facility: HOSPITAL | Age: 55
End: 2025-07-31
Payer: MEDICAID

## 2025-07-31 ENCOUNTER — OFFICE VISIT (OUTPATIENT)
Dept: FAMILY MEDICINE | Facility: CLINIC | Age: 55
End: 2025-07-31
Payer: MEDICAID

## 2025-07-31 VITALS
DIASTOLIC BLOOD PRESSURE: 68 MMHG | SYSTOLIC BLOOD PRESSURE: 122 MMHG | OXYGEN SATURATION: 95 % | BODY MASS INDEX: 35.36 KG/M2 | HEIGHT: 66 IN | HEART RATE: 87 BPM | TEMPERATURE: 98 F | WEIGHT: 220 LBS

## 2025-07-31 DIAGNOSIS — M25.612 DECREASED ROM OF LEFT SHOULDER: ICD-10-CM

## 2025-07-31 DIAGNOSIS — E11.65 UNCONTROLLED DIABETES MELLITUS WITH HYPERGLYCEMIA, WITH LONG-TERM CURRENT USE OF INSULIN: Primary | ICD-10-CM

## 2025-07-31 DIAGNOSIS — R53.1 WEAKNESS: Primary | ICD-10-CM

## 2025-07-31 DIAGNOSIS — E11.42 TYPE 2 DIABETES MELLITUS WITH DIABETIC POLYNEUROPATHY, WITH LONG-TERM CURRENT USE OF INSULIN: ICD-10-CM

## 2025-07-31 DIAGNOSIS — Z79.4 TYPE 2 DIABETES MELLITUS WITH DIABETIC POLYNEUROPATHY, WITH LONG-TERM CURRENT USE OF INSULIN: ICD-10-CM

## 2025-07-31 DIAGNOSIS — J44.9 CHRONIC OBSTRUCTIVE PULMONARY DISEASE, UNSPECIFIED COPD TYPE: ICD-10-CM

## 2025-07-31 DIAGNOSIS — R68.89 DECREASED FUNCTIONAL ACTIVITY TOLERANCE: ICD-10-CM

## 2025-07-31 DIAGNOSIS — Z12.11 SCREENING FOR COLON CANCER: ICD-10-CM

## 2025-07-31 DIAGNOSIS — Z79.4 UNCONTROLLED DIABETES MELLITUS WITH HYPERGLYCEMIA, WITH LONG-TERM CURRENT USE OF INSULIN: Primary | ICD-10-CM

## 2025-07-31 DIAGNOSIS — Z89.512 S/P BKA (BELOW KNEE AMPUTATION) UNILATERAL, LEFT: ICD-10-CM

## 2025-07-31 DIAGNOSIS — Z79.01 LONG TERM (CURRENT) USE OF ANTICOAGULANTS: ICD-10-CM

## 2025-07-31 DIAGNOSIS — N76.0 ACUTE VAGINITIS: ICD-10-CM

## 2025-07-31 DIAGNOSIS — Z86.718 HISTORY OF DVT (DEEP VEIN THROMBOSIS): ICD-10-CM

## 2025-07-31 PROCEDURE — 99214 OFFICE O/P EST MOD 30 MIN: CPT | Mod: S$PBB,,, | Performed by: INTERNAL MEDICINE

## 2025-07-31 PROCEDURE — 1160F RVW MEDS BY RX/DR IN RCRD: CPT | Mod: CPTII,,, | Performed by: INTERNAL MEDICINE

## 2025-07-31 PROCEDURE — 99215 OFFICE O/P EST HI 40 MIN: CPT | Mod: PBBFAC,PN | Performed by: INTERNAL MEDICINE

## 2025-07-31 PROCEDURE — 1159F MED LIST DOCD IN RCRD: CPT | Mod: CPTII,,, | Performed by: INTERNAL MEDICINE

## 2025-07-31 PROCEDURE — 97110 THERAPEUTIC EXERCISES: CPT | Mod: PN,CQ

## 2025-07-31 PROCEDURE — 3008F BODY MASS INDEX DOCD: CPT | Mod: CPTII,,, | Performed by: INTERNAL MEDICINE

## 2025-07-31 PROCEDURE — 4010F ACE/ARB THERAPY RXD/TAKEN: CPT | Mod: CPTII,,, | Performed by: INTERNAL MEDICINE

## 2025-07-31 PROCEDURE — 3074F SYST BP LT 130 MM HG: CPT | Mod: CPTII,,, | Performed by: INTERNAL MEDICINE

## 2025-07-31 PROCEDURE — 99999 PR PBB SHADOW E&M-EST. PATIENT-LVL V: CPT | Mod: PBBFAC,,, | Performed by: INTERNAL MEDICINE

## 2025-07-31 PROCEDURE — 3078F DIAST BP <80 MM HG: CPT | Mod: CPTII,,, | Performed by: INTERNAL MEDICINE

## 2025-07-31 PROCEDURE — 3046F HEMOGLOBIN A1C LEVEL >9.0%: CPT | Mod: CPTII,,, | Performed by: INTERNAL MEDICINE

## 2025-07-31 RX ORDER — FLUCONAZOLE 150 MG/1
150 TABLET ORAL DAILY
Qty: 2 TABLET | Refills: 0 | Status: SHIPPED | OUTPATIENT
Start: 2025-07-31

## 2025-07-31 NOTE — PROGRESS NOTES
"  Outpatient Rehab    Physical Therapy Visit    Patient Name: Audrey Natarajan  MRN: [id number]  YOB: 1970  Encounter Date: 7/31/2025    Therapy Diagnosis:   Encounter Diagnoses   Name Primary?    Weakness Yes    Decreased ROM of left shoulder     Decreased functional activity tolerance      Physician: Geoffrey Ramos NP    Physician Orders: Eval and Treat  Medical Diagnosis: Chronic neck pain  Chronic left shoulder pain  Surgical Diagnosis: Not applicable for this Episode   Surgical Date: Not applicable for this Episode  Days Since Last Surgery: Not applicable for this Episode    Visit # / Visits Authorized:  4 / 16  Insurance Authorization Period: 5/22/2025 to 8/8/2025  Date of Evaluation: 5/20/2025  Plan of Care Certification: 5/20/2025 to 9/20/2025      PT/PTA: PTA   Number of PTA visits since last PT visit:1  Time In: 0800   Time Out: 0855  Total Time (in minutes): 55   Total Billable Time (in minutes): 55    FOTO:  Intake Score (%): 64.2  Survey Score 2 (%): Not applicable for this Episode  Survey Score 3 (%): Not applicable for this Episode    Precautions:  Additional Precautions and Protocol Details: L foot amputation      Subjective   She reports Left upper traps tightness and shoulder pain today. She reports compliant with HEP.  Pain reported as 8/10.      Objective            Treatment:  Therapeutic Exercise  TE 1: Cervical ext with towel 2x10x3"  TE 2: SNAGS rotation with towel 4x20" each  TE 3: Cervical 4 ways YTB 15x3" each  TE 7: No money GTB 2 x 10 5"  TE 10: Seated row GTB 3x10 x 3"  Manual Therapy  MT 5: STM Left upper traps for tension relief using cupping      Time Entry(in minutes):  Therapeutic Exercise Time Entry: 55    Assessment & Plan   Assessment: Patient cont with tightness pain to her Left upper traps and Left shoulder pain. She has fwd head with Dowager's hump and rounded shoulders posture, arrived in wheelchair due to Left foot amputation. Educated patient on good " posture with daily activities like using phone or watching TV or cooking, perform chin tucks and scapular squeezes all day every and fix her posture, creat a new habit of sitting upright to prevent overuse neck muscles, neck pain, and muscle tightness, she verbalized understanding. STM using cupping technique was performed today for upper traps muscle relief, she responded well with pain subside post session. Instructed her to work on cervical stretching every day. Introduced cervical strengthening ex with theraband this visit as well. Overall, pt tolerated therapy well with no significant increase in pain. Will cont as tolerated to PLOF.   Evaluation/Treatment Tolerance: Patient tolerated treatment well    The patient will continue to benefit from skilled outpatient physical therapy in order to address the deficits listed in the problem list on the initial evaluation, provide patient and family education, and maximize the patients level of independence in the home and community environments.     The patient's spiritual, cultural, and educational needs were considered, and the patient is agreeable to the plan of care and goals.           Plan: CONT POC    Goals:   Active       Functional outcome       Patient will show a significant change in DASH patient-reported outcome tool to demonstrate subjective improvement       Start:  05/20/25    Expected End:  07/20/25            Patient stated goal: decrease pain, propel wheelchair with less pain        Start:  05/20/25    Expected End:  07/20/25            Patient will demonstrate independence in home program for support of progression       Start:  05/20/25    Expected End:  07/20/25               Hand and arm use       Patient will reach multiple directions with minimal pain and difficulty        Start:  05/20/25    Expected End:  07/20/25               Pain       Patient will report pain of 2/10 or less demonstrating a reduction of overall pain       Start:   05/20/25    Expected End:  07/20/25               Range of Motion       Patient will achieve left cervical rotation ROM increase by 5 degrees       Start:  05/20/25    Expected End:  07/20/25            Patient will achieve cervical flexion ROM increase by 5 degrees       Start:  05/20/25    Expected End:  07/20/25            Patient will achieve cervical extension ROM increase by  5 degrees       Start:  05/20/25    Expected End:  07/20/25            Patient will achieve left shoulder flexion of 150+ degrees       Start:  05/20/25    Expected End:  07/20/25            Patient will achieve left shoulder abduction of 150+ degrees       Start:  05/20/25    Expected End:  07/20/25            Patient will achieve left shoulder functional external rotation to reach base of occiput        Start:  05/20/25    Expected End:  07/20/25            Patient will achieve left shoulder functional internal rotation to reach L4       Start:  05/20/25    Expected End:  07/20/25               Strength       Patient will achieve bilateral shoulder flexion strength of 5/5       Start:  05/20/25    Expected End:  07/20/25            Patient will achieve bilateral shoulder abduction strength of 5/5       Start:  05/20/25    Expected End:  07/20/25            Patient will achieve bilateral elbow flexion strength of 5/5       Start:  05/20/25    Expected End:  07/20/25            Patient will achieve bilateral elbow extension strength of 5/5       Start:  05/20/25    Expected End:  07/20/25               functional        Pt will be able to propel wheelchair using L UE equally with R UE with minimal to no difficulty        Start:  05/20/25    Expected End:  07/20/25                Eran To, PTA

## 2025-08-01 NOTE — PROGRESS NOTES
"Subjective:       Patient ID: Audrey Natarajan is a 55 y.o. female.    Chief Complaint: Follow-up (3m f/u, pt would like to see if she can change her weekly inj. Pt is currently on antibiotics for a UTI that has cause an yeast infection.)    F/u chronic conditions/vaginal itching    HPI: 54 y/o IDDM poorly controlled (followed by endocrine) PAD recurrent DVT on DOAC s/p BKA chronic shoulder pain presents alone fro scheudled follow up. Treated by urgent care for UTI last week due to urinary frequency and dysuria POCT urine at that time with >1000 glucose she reports home glucose mostly in 200's no hypoglycemia less dysuria but now with vaginal itching similar to past yeast infections no vaginal spotting/bleeding. Continues PT for shoulder using tramadol one to two times per day along with muscle relaxer not using lower extremity prothesis     HM: due for CRC screening      Review of Systems    Objective:     Vitals:    07/31/25 1553   BP: 122/68   BP Location: Right arm   Patient Position: Sitting   Pulse: 87   Temp: 98.4 °F (36.9 °C)   TempSrc: Oral   SpO2: 95%   Weight: 99.8 kg (220 lb 0.3 oz)   Height: 5' 6" (1.676 m)          Physical Exam  Constitutional:       General: She is not in acute distress.     Appearance: She is well-developed. She is obese.   HENT:      Head: Normocephalic and atraumatic.   Eyes:      General: No scleral icterus.     Conjunctiva/sclera: Conjunctivae normal.   Cardiovascular:      Rate and Rhythm: Normal rate and regular rhythm.      Heart sounds: No murmur heard.     No friction rub. No gallop.   Pulmonary:      Effort: Pulmonary effort is normal.      Breath sounds: Normal breath sounds. No wheezing or rales.   Abdominal:      Palpations: Abdomen is soft.      Tenderness: There is no abdominal tenderness. There is no guarding or rebound.   Musculoskeletal:      Cervical back: Normal range of motion.   Skin:     General: Skin is warm and dry.   Neurological:      Mental Status: " She is alert and oriented to person, place, and time.      Cranial Nerves: No cranial nerve deficit.         Assessment and Plan   1. Uncontrolled diabetes mellitus with hyperglycemia, with long-term current use of insulin (Primary)  A1c still elevated titrating insulin with assistance of endocrine continue glp1 agonist on statin bp at goal    2. Type 2 diabetes mellitus with diabetic polyneuropathy, with long-term current use of insulin  As above continue gabapentin    3. History of DVT (deep vein thrombosis)  On docat    4. Long term (current) use of anticoagulants  Cbc stable    5. S/P BKA (below knee amputation) unilateral, left  Not using prosethesis has pull bar in bathroom    6. Chronic obstructive pulmonary disease, unspecified COPD type  Continue laba/ics    7. Screening for colon cancer  Fit kit  - Fecal Immunochemical Test (iFOBT); Future    8. Acute vaginitis  Suspect duet o uncontroled dm fluconazole po qod x 2 doses  - fluconazole (DIFLUCAN) 150 MG Tab; Take 1 tablet (150 mg total) by mouth once daily. May take second tab po two days after first if symptoms persist  Dispense: 2 tablet; Refill: 0

## 2025-08-05 ENCOUNTER — TELEPHONE (OUTPATIENT)
Dept: FAMILY MEDICINE | Facility: CLINIC | Age: 55
End: 2025-08-05
Payer: MEDICAID

## 2025-08-05 DIAGNOSIS — B35.9 TINEA: ICD-10-CM

## 2025-08-05 RX ORDER — NYSTATIN 100000 [USP'U]/G
POWDER TOPICAL
Qty: 60 G | Refills: 1 | Status: SHIPPED | OUTPATIENT
Start: 2025-08-05

## 2025-08-05 NOTE — TELEPHONE ENCOUNTER
Copied from CRM #[id number]. Topic: Medications - Medication Status Check   >> Aug 5, 2025 10:48 AM Fiona wrote:  Type: RX Refill Request    Who Called: self     Have you contacted your pharmacy: no     Refill or New Rx:refill    RX Name and Strength:nystatin (NYSTOP) powder      Preferred Pharmacy with phone number:[facility] #[id number] - [address] AT [address] [address]  Phone: [phone] Fax: [phone]        Local or Mail Order:local    Ordering Provider:    Would the patient rather a call back or a response via [facility]? call    Best Call Back Number:.[phone] (home)     Additional Information:

## 2025-08-05 NOTE — TELEPHONE ENCOUNTER
Last Office Visit Info:   The patient's last visit with Donaldo Pena MD was on 7/31/2025.    The patient's last visit in current department was on 7/31/2025.      Upcoming appointment 11/12/2025.

## 2025-08-06 ENCOUNTER — OFFICE VISIT (OUTPATIENT)
Dept: PODIATRY | Facility: CLINIC | Age: 55
End: 2025-08-06
Payer: MEDICAID

## 2025-08-06 VITALS — BODY MASS INDEX: 35.36 KG/M2 | HEIGHT: 66 IN | WEIGHT: 220 LBS

## 2025-08-06 DIAGNOSIS — E11.49 TYPE II DIABETES MELLITUS WITH NEUROLOGICAL MANIFESTATIONS: Primary | ICD-10-CM

## 2025-08-06 DIAGNOSIS — B35.1 ONYCHOMYCOSIS DUE TO DERMATOPHYTE: ICD-10-CM

## 2025-08-06 PROCEDURE — 99214 OFFICE O/P EST MOD 30 MIN: CPT | Mod: PBBFAC,PO,25 | Performed by: PODIATRIST

## 2025-08-06 PROCEDURE — 99999 PR PBB SHADOW E&M-EST. PATIENT-LVL IV: CPT | Mod: PBBFAC,,, | Performed by: PODIATRIST

## 2025-08-06 PROCEDURE — 11720 DEBRIDE NAIL 1-5: CPT | Mod: PBBFAC,PO | Performed by: PODIATRIST

## 2025-08-06 NOTE — PROGRESS NOTES
Subjective:     Patient ID: Audrey Natarajan is a 55 y.o. female.    Chief Complaint: Diabetes Mellitus (07/31/25 Dr Pena)    Audrey is a 55 y.o. female who presents to the clinic for evaluation and treatment of high risk feet. Audrey has a past medical history of ADHD (attention deficit hyperactivity disorder), Arthritis, Asthma, Bipolar 1 disorder, Cataract, Cigarette smoker, COPD (chronic obstructive pulmonary disease), Coronary artery disease, Depression, Diabetes mellitus, Diabetic foot ulcers, Diabetic neuropathy, DVT of lower extremity, bilateral (07/2013), Encounter for blood transfusion, History of blood clots (1. Left Leg=2003; 2.Bilateral Groin=Blood Clots= 5 or 6/ 2013 & 7/2013; 3. LLL of Lung=7/2013;  4. Lt. Lower Leg=7/2013. ), HTN (hypertension) (06/06/2013), Hypercholesteremia, Irregular heartbeat, Neuromuscular disorder, Obese, PE (pulmonary embolism) (07/2013), and Restless leg syndrome. The patient's chief complaint is long, thick toenails. This patient has documented high risk feet requiring routine maintenance secondary to peripheral neuropathy.    PCP: Donaldo Pena MD    Date Last Seen by PCP: per above    Current shoe gear:  Affected Foot: Tennis shoes     Unaffected Foot: Tennis shoes    Hemoglobin A1C   Date Value Ref Range Status   02/10/2025 11.4 (H) 4.0 - 5.6 % Final     Comment:     ADA Screening Guidelines:  5.7-6.4%  Consistent with prediabetes  >or=6.5%  Consistent with diabetes    High levels of fetal hemoglobin interfere with the HbA1C  assay. Heterozygous hemoglobin variants (HbS, HgC, etc)do  not significantly interfere with this assay.   However, presence of multiple variants may affect accuracy.     10/11/2024 13.3 (H) 4.0 - 5.6 % Final     Comment:     ADA Screening Guidelines:  5.7-6.4%  Consistent with prediabetes  >or=6.5%  Consistent with diabetes    High levels of fetal hemoglobin interfere with the HbA1C  assay. Heterozygous hemoglobin variants (HbS, HgC,  etc)do  not significantly interfere with this assay.   However, presence of multiple variants may affect accuracy.     04/22/2024 11.0 (H) 4.0 - 5.6 % Final     Comment:     ADA Screening Guidelines:  5.7-6.4%  Consistent with prediabetes  >or=6.5%  Consistent with diabetes    High levels of fetal hemoglobin interfere with the HbA1C  assay. Heterozygous hemoglobin variants (HbS, HgC, etc)do  not significantly interfere with this assay.   However, presence of multiple variants may affect accuracy.       Hemoglobin A1c   Date Value Ref Range Status   03/27/2025 11.6 (H) 4.0 - 5.6 % Final     Comment:     ADA Screening Guidelines:  5.7-6.4%  Consistent with prediabetes  >=6.5%  Consistent with diabetes    High levels of fetal hemoglobin interfere with the HbA1C  assay. Heterozygous hemoglobin variants (HbS, HgC, etc)do  not significantly interfere with this assay.   However, presence of multiple variants may affect accuracy.       Review of Systems   Constitutional: Negative for chills.   Cardiovascular:  Negative for chest pain and claudication.   Respiratory:  Negative for cough.    Skin:  Positive for color change, dry skin and nail changes.   Musculoskeletal:  Positive for joint pain.   Gastrointestinal:  Negative for nausea.   Neurological:  Positive for paresthesias. Negative for numbness.   Psychiatric/Behavioral:  The patient is not nervous/anxious.         Objective:     Physical Exam  Constitutional:       Appearance: She is well-developed.      Comments: Oriented to time, place, and person.   Cardiovascular:      Comments: DP and PT pulses are palpable bilaterally. 3 sec capillary refill time and toes and feet are warm to touch proximally .  There is  hair growth on the feet and toes b/l. There is no edema b/l. No spider veins or varicosities present b/l.     Musculoskeletal:      Comments: Equinus noted b/l ankles with < 10 deg DF noted. MMT 5/5 in DF/PF/Inv/Ev resistance with no reproduction of pain in any  direction. Passive range of motion of ankle and pedal joints is painless b/l.    H/o L BKA    Feet:      Right foot:      Skin integrity: No callus or dry skin.      Left foot:      Skin integrity: No callus or dry skin.   Lymphadenopathy:      Comments: Negative lymphadenopathy bilateral popliteal fossa and tarsal tunnel.   Skin:     Comments: No open lesions, lacerations or wounds noted.Interdigital spaces clean, dry and intact b/l. No erythema noted to b/l foot.    Toenails 1-5 right are elongated by 2-3 mm, thickened by 2-3 mm, discolored/yellowed, dystrophic, brittle with subungual debris.       Neurological:      Mental Status: She is alert.      Comments: Light touch, proprioception, and sharp/dull sensation are all intact bilaterally. Protective threshold with the [place] monofilament is intact bilaterally.    Psychiatric:         Behavior: Behavior is cooperative.           Assessment:      Encounter Diagnoses   Name Primary?    Type II diabetes mellitus with neurological manifestations Yes    Onychomycosis due to dermatophyte        Plan:     Audrey was seen today for diabetes mellitus.    Diagnoses and all orders for this visit:    Type II diabetes mellitus with neurological manifestations    Onychomycosis due to dermatophyte        I counseled the patient on her conditions, their implications and medical management.      - Shoe inspection. Diabetic Foot Education. Patient reminded of the importance of good nutrition and blood sugar control to help prevent podiatric complications of diabetes. Patient instructed on proper foot hygeine. We discussed wearing proper shoe gear, daily foot inspections, never walking without protective shoe gear, never putting sharp instruments to feet, routine podiatric nail visits every 2-3 months.   - With patient's permission, nails were aggressively reduced and debrided x 5 to their soft tissue attachment mechanically and with electric , removing all  offending nail and debris. Patient relates relief following the procedure. He will continue to monitor the areas daily, inspect his feet, wear protective shoe gear when ambulatory, moisturizer to maintain skin integrity and follow in this office in approximately 2-3 months, sooner p.r.n.     Rx diabetic shoes with custom molded inserts to be worn at all times while ambulating. Prescription provided with list of local retailers.

## 2025-08-07 ENCOUNTER — CLINICAL SUPPORT (OUTPATIENT)
Dept: REHABILITATION | Facility: HOSPITAL | Age: 55
End: 2025-08-07
Payer: MEDICAID

## 2025-08-07 DIAGNOSIS — M25.512 CHRONIC LEFT SHOULDER PAIN: ICD-10-CM

## 2025-08-07 DIAGNOSIS — R53.1 WEAKNESS: Primary | ICD-10-CM

## 2025-08-07 DIAGNOSIS — R68.89 DECREASED FUNCTIONAL ACTIVITY TOLERANCE: ICD-10-CM

## 2025-08-07 DIAGNOSIS — G54.6 PHANTOM LIMB PAIN: ICD-10-CM

## 2025-08-07 DIAGNOSIS — G89.29 CHRONIC LEFT SHOULDER PAIN: ICD-10-CM

## 2025-08-07 DIAGNOSIS — M25.612 DECREASED ROM OF LEFT SHOULDER: ICD-10-CM

## 2025-08-07 PROCEDURE — 97110 THERAPEUTIC EXERCISES: CPT | Mod: PN,CQ

## 2025-08-07 RX ORDER — TRAMADOL HYDROCHLORIDE 50 MG/1
TABLET, FILM COATED ORAL
Qty: 60 TABLET | OUTPATIENT
Start: 2025-08-07

## 2025-08-07 NOTE — PROGRESS NOTES
"  Outpatient Rehab    Physical Therapy Visit    Patient Name: Audrey Natarajan  MRN: [id number]  YOB: 1970  Encounter Date: 8/7/2025    Therapy Diagnosis:   Encounter Diagnoses   Name Primary?    Weakness Yes    Decreased ROM of left shoulder     Decreased functional activity tolerance      Physician: Geoffrey Ramos NP    Physician Orders: Eval and Treat  Medical Diagnosis: Chronic neck pain  Chronic left shoulder pain  Surgical Diagnosis: Not applicable for this Episode   Surgical Date: Not applicable for this Episode  Days Since Last Surgery: Not applicable for this Episode    Visit # / Visits Authorized:  5 / 16  Insurance Authorization Period: 5/22/2025 to 8/8/2025  Date of Evaluation: 5/20/2025  Plan of Care Certification: 5/20/2025 to 9/20/2025      PT/PTA: PTA   Number of PTA visits since last PT visit:2  Time In: 0800   Time Out: 0855  Total Time (in minutes): 55   Total Billable Time (in minutes): 55    FOTO:  Intake Score (%): 64.2  Survey Score 2 (%): Not applicable for this Episode  Survey Score 3 (%): Not applicable for this Episode    Precautions:  Additional Precautions and Protocol Details: L foot amputation      Subjective   She reports feeling better today, the cupping really helps.  Pain reported as 5/10.      Objective            Treatment:  Therapeutic Exercise  TE 1: Cervical ext with towel 2x10x3"  TE 2: SNAGS rotation with towel 4x20" each  TE 3: Cervical 4 ways YTB 15x3" each  TE 5: UBE 6 min level 1  TE 7: No money GTB 2 x 10 5"  TE 10: Seated row GTB 3x10 x 3"  Manual Therapy  MT 5: STM Left upper traps for tension relief using cupping      Time Entry(in minutes):  Therapeutic Exercise Time Entry: 55    Assessment & Plan   Assessment: Patient cont with some tightness pain to her Left upper traps and Left shoulder pain. She reports feeling better today, the cupping really helps. Also reporting that she tries to fix her sitting posture and not using phone with head down " which she thinks it helps too. Introduced UBE for UE strengthening, she tolerated well. STM using cupping technique was performed again today for upper traps muscle relief, she responded well with pain subside post session. Instructed her to work on cervical stretching every day. Overall, pt tolerated therapy well with no significant increase in pain. Will cont as tolerated to PLOF.   Evaluation/Treatment Tolerance: Patient tolerated treatment well    The patient will continue to benefit from skilled outpatient physical therapy in order to address the deficits listed in the problem list on the initial evaluation, provide patient and family education, and maximize the patients level of independence in the home and community environments.     The patient's spiritual, cultural, and educational needs were considered, and the patient is agreeable to the plan of care and goals.           Plan: CONT POC    Goals:   Active       Functional outcome       Patient will show a significant change in DASH patient-reported outcome tool to demonstrate subjective improvement       Start:  05/20/25    Expected End:  07/20/25            Patient stated goal: decrease pain, propel wheelchair with less pain        Start:  05/20/25    Expected End:  07/20/25            Patient will demonstrate independence in home program for support of progression       Start:  05/20/25    Expected End:  07/20/25               Hand and arm use       Patient will reach multiple directions with minimal pain and difficulty        Start:  05/20/25    Expected End:  07/20/25               Pain       Patient will report pain of 2/10 or less demonstrating a reduction of overall pain       Start:  05/20/25    Expected End:  07/20/25               Range of Motion       Patient will achieve left cervical rotation ROM increase by 5 degrees       Start:  05/20/25    Expected End:  07/20/25            Patient will achieve cervical flexion ROM increase by 5 degrees        Start:  05/20/25    Expected End:  07/20/25            Patient will achieve cervical extension ROM increase by  5 degrees       Start:  05/20/25    Expected End:  07/20/25            Patient will achieve left shoulder flexion of 150+ degrees       Start:  05/20/25    Expected End:  07/20/25            Patient will achieve left shoulder abduction of 150+ degrees       Start:  05/20/25    Expected End:  07/20/25            Patient will achieve left shoulder functional external rotation to reach base of occiput        Start:  05/20/25    Expected End:  07/20/25            Patient will achieve left shoulder functional internal rotation to reach L4       Start:  05/20/25    Expected End:  07/20/25               Strength       Patient will achieve bilateral shoulder flexion strength of 5/5       Start:  05/20/25    Expected End:  07/20/25            Patient will achieve bilateral shoulder abduction strength of 5/5       Start:  05/20/25    Expected End:  07/20/25            Patient will achieve bilateral elbow flexion strength of 5/5       Start:  05/20/25    Expected End:  07/20/25            Patient will achieve bilateral elbow extension strength of 5/5       Start:  05/20/25    Expected End:  07/20/25               functional        Pt will be able to propel wheelchair using L UE equally with R UE with minimal to no difficulty        Start:  05/20/25    Expected End:  07/20/25                Eran To, PTA

## 2025-08-08 RX ORDER — TRAMADOL HYDROCHLORIDE 50 MG/1
50 TABLET, FILM COATED ORAL EVERY 12 HOURS PRN
Qty: 60 TABLET | Refills: 0 | Status: SHIPPED | OUTPATIENT
Start: 2025-08-11

## 2025-08-08 NOTE — TELEPHONE ENCOUNTER
Copied from CRM #[id number]. Topic: Medications - Medication Refill  >> Aug 8, 2025  1:50 PM Marybel wrote:  Type: RX Refill Request    Who Called: self     Have you contacted your pharmacy:yes     Refill or New Rx:refill  RX Name and Strength:traMADoL (ULTRAM) 50 mg tablet             How is the patient currently taking it? (ex. 1XDay):    Is this a 30 day or 90 day RX:    Preferred Pharmacy with phone number:[id number] [place], [place] - [address] AT [facility] [phone]        Local or Mail Order:local    Ordering Provider:dr jamar hoang    Would the patient rather a call back or a response via [facility]?callback     Best Call Back Number:..[phone]      Additional Information:

## 2025-08-14 ENCOUNTER — TELEPHONE (OUTPATIENT)
Dept: FAMILY MEDICINE | Facility: CLINIC | Age: 55
End: 2025-08-14
Payer: MEDICAID

## 2025-08-21 ENCOUNTER — TELEPHONE (OUTPATIENT)
Dept: ORTHOPEDICS | Facility: CLINIC | Age: 55
End: 2025-08-21
Payer: MEDICAID

## 2025-08-28 ENCOUNTER — CLINICAL SUPPORT (OUTPATIENT)
Dept: REHABILITATION | Facility: HOSPITAL | Age: 55
End: 2025-08-28
Payer: MEDICAID

## 2025-08-28 ENCOUNTER — PATIENT OUTREACH (OUTPATIENT)
Dept: ADMINISTRATIVE | Facility: HOSPITAL | Age: 55
End: 2025-08-28
Payer: MEDICAID

## 2025-08-28 DIAGNOSIS — R53.1 WEAKNESS: Primary | ICD-10-CM

## 2025-08-28 DIAGNOSIS — R68.89 DECREASED FUNCTIONAL ACTIVITY TOLERANCE: ICD-10-CM

## 2025-08-28 DIAGNOSIS — I10 ESSENTIAL HYPERTENSION: Chronic | ICD-10-CM

## 2025-08-28 DIAGNOSIS — M25.612 DECREASED ROM OF LEFT SHOULDER: ICD-10-CM

## 2025-08-28 DIAGNOSIS — E11.42 TYPE 2 DIABETES MELLITUS WITH DIABETIC POLYNEUROPATHY, WITHOUT LONG-TERM CURRENT USE OF INSULIN: Primary | Chronic | ICD-10-CM

## 2025-08-28 PROCEDURE — 97110 THERAPEUTIC EXERCISES: CPT | Mod: PN,CQ

## 2025-09-02 ENCOUNTER — TELEPHONE (OUTPATIENT)
Dept: FAMILY MEDICINE | Facility: CLINIC | Age: 55
End: 2025-09-02
Payer: MEDICAID

## 2025-09-02 DIAGNOSIS — Z79.4 TYPE 2 DIABETES MELLITUS WITH DIABETIC POLYNEUROPATHY, WITH LONG-TERM CURRENT USE OF INSULIN: ICD-10-CM

## 2025-09-02 DIAGNOSIS — G89.29 CHRONIC LEFT SHOULDER PAIN: ICD-10-CM

## 2025-09-02 DIAGNOSIS — M25.512 CHRONIC LEFT SHOULDER PAIN: ICD-10-CM

## 2025-09-02 DIAGNOSIS — Z89.512 S/P BKA (BELOW KNEE AMPUTATION) UNILATERAL, LEFT: Primary | ICD-10-CM

## 2025-09-02 DIAGNOSIS — G54.6 PHANTOM LIMB PAIN: ICD-10-CM

## 2025-09-02 DIAGNOSIS — E11.42 TYPE 2 DIABETES MELLITUS WITH DIABETIC POLYNEUROPATHY, WITH LONG-TERM CURRENT USE OF INSULIN: ICD-10-CM

## 2025-09-02 RX ORDER — TRAMADOL HYDROCHLORIDE 50 MG/1
50 TABLET, FILM COATED ORAL EVERY 12 HOURS PRN
Qty: 60 TABLET | Refills: 1 | Status: SHIPPED | OUTPATIENT
Start: 2025-09-02

## 2025-09-02 RX ORDER — GABAPENTIN 300 MG/1
600 CAPSULE ORAL 3 TIMES DAILY
Qty: 180 CAPSULE | Refills: 2 | Status: SHIPPED | OUTPATIENT
Start: 2025-09-02 | End: 2025-09-02 | Stop reason: CLARIF

## 2025-09-02 RX ORDER — GABAPENTIN 300 MG/1
600 CAPSULE ORAL 3 TIMES DAILY
Qty: 180 CAPSULE | Refills: 2 | Status: CANCELLED | OUTPATIENT
Start: 2025-09-02

## 2025-09-02 RX ORDER — GABAPENTIN 600 MG/1
600 TABLET ORAL 3 TIMES DAILY
Qty: 270 TABLET | Refills: 1 | Status: SHIPPED | OUTPATIENT
Start: 2025-09-02 | End: 2026-09-02

## 2025-09-03 ENCOUNTER — CLINICAL SUPPORT (OUTPATIENT)
Dept: REHABILITATION | Facility: HOSPITAL | Age: 55
End: 2025-09-03
Payer: MEDICAID

## 2025-09-03 DIAGNOSIS — M25.612 DECREASED ROM OF LEFT SHOULDER: ICD-10-CM

## 2025-09-03 DIAGNOSIS — R53.1 WEAKNESS: Primary | ICD-10-CM

## 2025-09-03 DIAGNOSIS — R68.89 DECREASED FUNCTIONAL ACTIVITY TOLERANCE: ICD-10-CM

## 2025-09-03 PROCEDURE — 97110 THERAPEUTIC EXERCISES: CPT | Mod: PN,CQ

## (undated) DEVICE — SEE MEDLINE ITEM 146292

## (undated) DEVICE — Device

## (undated) DEVICE — BLANKET UPPER BODY 78.7X29.9IN

## (undated) DEVICE — GLOVE BIOGEL 7.5

## (undated) DEVICE — KIT DRSNG GRNUFM MED 18X12X5CM

## (undated) DEVICE — TROCAR ENDOPATH XCEL 11MM 10CM

## (undated) DEVICE — BNDG COFLEX FOAM LF2 ST 6X5YD

## (undated) DEVICE — DEBRIDEMENT PROBE WITH ASPIRATION

## (undated) DEVICE — ELECTRODE REM PLYHSV RETURN 9

## (undated) DEVICE — GLOVE BIOGEL PI MICRO SZ 7

## (undated) DEVICE — SPONGE DERMACEA GAUZE 4X4

## (undated) DEVICE — SEE MEDLINE ITEM 146298

## (undated) DEVICE — PAD ABD 8X10 STERILE

## (undated) DEVICE — PACKING STRIP IDOFORM 1/4X5YD

## (undated) DEVICE — CANISTER SUCTION 2 LTR

## (undated) DEVICE — SHOE POST OP FEMALE MED BLACK

## (undated) DEVICE — PULSAVAC ZIMMER

## (undated) DEVICE — MISONIX

## (undated) DEVICE — COVER OVERHEAD SURG LT BLUE

## (undated) DEVICE — KIT ANTIFOG

## (undated) DEVICE — BRACE LOWER LEG SIZE 7 MEDIUM

## (undated) DEVICE — TIP SUCTION YANKAUER

## (undated) DEVICE — SEE MEDLINE ITEM 157110

## (undated) DEVICE — SUT VICRYL PLUS 4-0 P3 18IN

## (undated) DEVICE — NDL HYPO REG 25G X 1 1/2

## (undated) DEVICE — APPLICATOR CHLORAPREP ORN 26ML

## (undated) DEVICE — TOWEL OR DISP STRL BLUE 4/PK

## (undated) DEVICE — SUPPORT ULNA NERVE PROTECTOR

## (undated) DEVICE — NDL 18GA X1 1/2 REG BEVEL

## (undated) DEVICE — BANDAGE ELAS SOFTWRAP ST 4X5YD

## (undated) DEVICE — SEE MEDLINE ITEM 152530

## (undated) DEVICE — SPONGE LAP 18X18 PREWASHED

## (undated) DEVICE — BLADE SURG CARBON STEEL SZ11

## (undated) DEVICE — GOWN SURGEON XLG

## (undated) DEVICE — GLOVE BIOGEL PI MICRO SZ 6

## (undated) DEVICE — BANDAGE ELAS SOFTWRAP ST 6X5YD

## (undated) DEVICE — PACK ARTHROSCOPY W/ISO BAC

## (undated) DEVICE — SYR 10CC LUER LOCK

## (undated) DEVICE — UNDERGLOVES BIOGEL PI SIZE 8

## (undated) DEVICE — PAD PREP 50/CA

## (undated) DEVICE — DRESSING GAUZE XEROFORM 5X9

## (undated) DEVICE — BLADE SAW OSC ME-92 COATED

## (undated) DEVICE — SUT 0 18IN SILK BLK BRAIDE

## (undated) DEVICE — GAUZE SPONGE 4X4 12PLY

## (undated) DEVICE — TOURNIQUET SB QC DP 34X4IN

## (undated) DEVICE — NDL 11GA X 6 JAMSHIDI

## (undated) DEVICE — CLOSURE SKIN STERI STRIP 1/2X4

## (undated) DEVICE — SUT ETHILON 3-0 PS2 18 BLK

## (undated) DEVICE — DRAPE SURG W/TWL 17 5/8X23

## (undated) DEVICE — SUT PROLENE 4-0 PS2 18 BLUE

## (undated) DEVICE — SEE MEDLINE ITEM 152622

## (undated) DEVICE — POSITIONER HEAD DONUT 9IN FOAM

## (undated) DEVICE — SOL NACL IRR 1000ML BTL

## (undated) DEVICE — PAD CAST SPECIALIST STRL 6

## (undated) DEVICE — DRESSING N ADH OIL EMUL 3X8

## (undated) DEVICE — SUT VICRYL+ 1 CTX 18IN VIOL

## (undated) DEVICE — GLOVE BIOGEL ORTHOPEDIC 7.5

## (undated) DEVICE — TROCAR ENDOPATH XCEL 5X100MM

## (undated) DEVICE — STOCKINET 4INX48

## (undated) DEVICE — STAPLER SKIN ROTATING HEAD

## (undated) DEVICE — SHEARS HARMONIC 36CM HD 1000I

## (undated) DEVICE — SPONGE GAUZE 16PLY 4X4

## (undated) DEVICE — BANDAGE ACE ELASTIC 6"

## (undated) DEVICE — DRESSING ADH ISLAND 2.5 X 3

## (undated) DEVICE — TUBING INSUFFLATION 10

## (undated) DEVICE — PADDING CAST SOFT-ROLL 6 X 4

## (undated) DEVICE — BANDAGE ESMARK ELASTIC ST 4X9

## (undated) DEVICE — UNDERGLOVES BIOGEL PI SIZE 7.5

## (undated) DEVICE — SUT 2-0 12-18IN SILK

## (undated) DEVICE — APPLIER CLIP ENDO LIGAMAX 5MM

## (undated) DEVICE — SHEET DRAPE FAN-FOLDED 3/4

## (undated) DEVICE — SEE L#120831

## (undated) DEVICE — PAD CAST SPECIALIST STRL 4

## (undated) DEVICE — BLADE SURG CARBON STEEL #10

## (undated) DEVICE — SEE MEDLINE ITEM 107746

## (undated) DEVICE — PACK ENDOSCOPY GENERAL